# Patient Record
Sex: MALE | Race: BLACK OR AFRICAN AMERICAN | NOT HISPANIC OR LATINO | URBAN - METROPOLITAN AREA
[De-identification: names, ages, dates, MRNs, and addresses within clinical notes are randomized per-mention and may not be internally consistent; named-entity substitution may affect disease eponyms.]

---

## 2017-08-25 ENCOUNTER — INPATIENT (INPATIENT)
Facility: HOSPITAL | Age: 62
LOS: 60 days | Discharge: HOME | DRG: 1 | End: 2017-10-25
Attending: THORACIC SURGERY (CARDIOTHORACIC VASCULAR SURGERY) | Admitting: INTERNAL MEDICINE
Payer: COMMERCIAL

## 2017-08-25 VITALS
SYSTOLIC BLOOD PRESSURE: 108 MMHG | HEART RATE: 111 BPM | DIASTOLIC BLOOD PRESSURE: 76 MMHG | HEIGHT: 70 IN | RESPIRATION RATE: 22 BRPM | WEIGHT: 145.06 LBS | OXYGEN SATURATION: 100 % | TEMPERATURE: 94 F

## 2017-08-25 DIAGNOSIS — I26.99 OTHER PULMONARY EMBOLISM WITHOUT ACUTE COR PULMONALE: ICD-10-CM

## 2017-08-25 DIAGNOSIS — R07.89 OTHER CHEST PAIN: ICD-10-CM

## 2017-08-25 LAB
ALBUMIN SERPL ELPH-MCNC: 3.9 G/DL — SIGNIFICANT CHANGE UP (ref 3.3–5)
ALP SERPL-CCNC: 87 U/L — SIGNIFICANT CHANGE UP (ref 40–120)
ALT FLD-CCNC: 89 U/L RC — HIGH (ref 10–45)
ANION GAP SERPL CALC-SCNC: 13 MMOL/L — SIGNIFICANT CHANGE UP (ref 5–17)
APTT BLD: 29.7 SEC — SIGNIFICANT CHANGE UP (ref 27.5–37.4)
AST SERPL-CCNC: 101 U/L — HIGH (ref 10–40)
BASOPHILS # BLD AUTO: 0.1 K/UL — SIGNIFICANT CHANGE UP (ref 0–0.2)
BASOPHILS NFR BLD AUTO: 1.3 % — SIGNIFICANT CHANGE UP (ref 0–2)
BILIRUB SERPL-MCNC: 0.4 MG/DL — SIGNIFICANT CHANGE UP (ref 0.2–1.2)
BUN SERPL-MCNC: 15 MG/DL — SIGNIFICANT CHANGE UP (ref 7–23)
CALCIUM SERPL-MCNC: 9.2 MG/DL — SIGNIFICANT CHANGE UP (ref 8.4–10.5)
CHLORIDE SERPL-SCNC: 101 MMOL/L — SIGNIFICANT CHANGE UP (ref 96–108)
CO2 SERPL-SCNC: 26 MMOL/L — SIGNIFICANT CHANGE UP (ref 22–31)
CREAT SERPL-MCNC: 1.14 MG/DL — SIGNIFICANT CHANGE UP (ref 0.5–1.3)
D DIMER BLD IA.RAPID-MCNC: 4027 NG/ML DDU — HIGH
EOSINOPHIL # BLD AUTO: 0.3 K/UL — SIGNIFICANT CHANGE UP (ref 0–0.5)
EOSINOPHIL NFR BLD AUTO: 5.6 % — SIGNIFICANT CHANGE UP (ref 0–6)
GAS PNL BLDV: SIGNIFICANT CHANGE UP
GLUCOSE SERPL-MCNC: 81 MG/DL — SIGNIFICANT CHANGE UP (ref 70–99)
HCT VFR BLD CALC: 45 % — SIGNIFICANT CHANGE UP (ref 39–50)
HGB BLD-MCNC: 15.3 G/DL — SIGNIFICANT CHANGE UP (ref 13–17)
INR BLD: 1.09 RATIO — SIGNIFICANT CHANGE UP (ref 0.88–1.16)
LIDOCAIN IGE QN: 23 U/L — SIGNIFICANT CHANGE UP (ref 7–60)
LYMPHOCYTES # BLD AUTO: 2.2 K/UL — SIGNIFICANT CHANGE UP (ref 1–3.3)
LYMPHOCYTES # BLD AUTO: 41.2 % — SIGNIFICANT CHANGE UP (ref 13–44)
MAGNESIUM SERPL-MCNC: 2.2 MG/DL — SIGNIFICANT CHANGE UP (ref 1.6–2.6)
MCHC RBC-ENTMCNC: 34.1 GM/DL — SIGNIFICANT CHANGE UP (ref 32–36)
MCHC RBC-ENTMCNC: 34.1 PG — HIGH (ref 27–34)
MCV RBC AUTO: 100 FL — SIGNIFICANT CHANGE UP (ref 80–100)
MONOCYTES # BLD AUTO: 0.6 K/UL — SIGNIFICANT CHANGE UP (ref 0–0.9)
MONOCYTES NFR BLD AUTO: 10.6 % — SIGNIFICANT CHANGE UP (ref 2–14)
NEUTROPHILS # BLD AUTO: 2.2 K/UL — SIGNIFICANT CHANGE UP (ref 1.8–7.4)
NEUTROPHILS NFR BLD AUTO: 41.2 % — LOW (ref 43–77)
PHOSPHATE SERPL-MCNC: 2.9 MG/DL — SIGNIFICANT CHANGE UP (ref 2.5–4.5)
PLATELET # BLD AUTO: 164 K/UL — SIGNIFICANT CHANGE UP (ref 150–400)
POTASSIUM SERPL-MCNC: 4.3 MMOL/L — SIGNIFICANT CHANGE UP (ref 3.5–5.3)
POTASSIUM SERPL-SCNC: 4.3 MMOL/L — SIGNIFICANT CHANGE UP (ref 3.5–5.3)
PROT SERPL-MCNC: 7.1 G/DL — SIGNIFICANT CHANGE UP (ref 6–8.3)
PROTHROM AB SERPL-ACNC: 11.8 SEC — SIGNIFICANT CHANGE UP (ref 9.8–12.7)
RAPID RVP RESULT: SIGNIFICANT CHANGE UP
RBC # BLD: 4.49 M/UL — SIGNIFICANT CHANGE UP (ref 4.2–5.8)
RBC # FLD: 13.7 % — SIGNIFICANT CHANGE UP (ref 10.3–14.5)
SODIUM SERPL-SCNC: 140 MMOL/L — SIGNIFICANT CHANGE UP (ref 135–145)
TROPONIN T SERPL-MCNC: <0.01 NG/ML — SIGNIFICANT CHANGE UP (ref 0–0.06)
WBC # BLD: 5.2 K/UL — SIGNIFICANT CHANGE UP (ref 3.8–10.5)
WBC # FLD AUTO: 5.2 K/UL — SIGNIFICANT CHANGE UP (ref 3.8–10.5)

## 2017-08-25 PROCEDURE — 71275 CT ANGIOGRAPHY CHEST: CPT | Mod: 26

## 2017-08-25 PROCEDURE — 71010: CPT | Mod: 26

## 2017-08-25 PROCEDURE — 99285 EMERGENCY DEPT VISIT HI MDM: CPT

## 2017-08-25 RX ORDER — IBUPROFEN 200 MG
400 TABLET ORAL EVERY 6 HOURS
Qty: 0 | Refills: 0 | Status: DISCONTINUED | OUTPATIENT
Start: 2017-08-25 | End: 2017-08-27

## 2017-08-25 RX ORDER — HEPARIN SODIUM 5000 [USP'U]/ML
2500 INJECTION INTRAVENOUS; SUBCUTANEOUS EVERY 6 HOURS
Qty: 0 | Refills: 0 | Status: DISCONTINUED | OUTPATIENT
Start: 2017-08-25 | End: 2017-08-25

## 2017-08-25 RX ORDER — SODIUM CHLORIDE 9 MG/ML
1000 INJECTION INTRAMUSCULAR; INTRAVENOUS; SUBCUTANEOUS ONCE
Qty: 0 | Refills: 0 | Status: COMPLETED | OUTPATIENT
Start: 2017-08-25 | End: 2017-08-25

## 2017-08-25 RX ORDER — HEPARIN SODIUM 5000 [USP'U]/ML
INJECTION INTRAVENOUS; SUBCUTANEOUS
Qty: 25000 | Refills: 0 | Status: DISCONTINUED | OUTPATIENT
Start: 2017-08-25 | End: 2017-08-25

## 2017-08-25 RX ORDER — HEPARIN SODIUM 5000 [USP'U]/ML
5500 INJECTION INTRAVENOUS; SUBCUTANEOUS ONCE
Qty: 0 | Refills: 0 | Status: COMPLETED | OUTPATIENT
Start: 2017-08-25 | End: 2017-08-25

## 2017-08-25 RX ORDER — HEPARIN SODIUM 5000 [USP'U]/ML
5500 INJECTION INTRAVENOUS; SUBCUTANEOUS EVERY 6 HOURS
Qty: 0 | Refills: 0 | Status: DISCONTINUED | OUTPATIENT
Start: 2017-08-25 | End: 2017-08-25

## 2017-08-25 RX ORDER — MORPHINE SULFATE 50 MG/1
2 CAPSULE, EXTENDED RELEASE ORAL ONCE
Qty: 0 | Refills: 0 | Status: DISCONTINUED | OUTPATIENT
Start: 2017-08-25 | End: 2017-08-25

## 2017-08-25 RX ORDER — HEPARIN SODIUM 5000 [USP'U]/ML
5000 INJECTION INTRAVENOUS; SUBCUTANEOUS EVERY 8 HOURS
Qty: 0 | Refills: 0 | Status: DISCONTINUED | OUTPATIENT
Start: 2017-08-25 | End: 2017-08-28

## 2017-08-25 RX ADMIN — SODIUM CHLORIDE 1000 MILLILITER(S): 9 INJECTION INTRAMUSCULAR; INTRAVENOUS; SUBCUTANEOUS at 15:11

## 2017-08-25 RX ADMIN — MORPHINE SULFATE 2 MILLIGRAM(S): 50 CAPSULE, EXTENDED RELEASE ORAL at 20:09

## 2017-08-25 RX ADMIN — MORPHINE SULFATE 2 MILLIGRAM(S): 50 CAPSULE, EXTENDED RELEASE ORAL at 20:24

## 2017-08-25 RX ADMIN — HEPARIN SODIUM 1200 UNIT(S)/HR: 5000 INJECTION INTRAVENOUS; SUBCUTANEOUS at 20:18

## 2017-08-25 RX ADMIN — HEPARIN SODIUM 5500 UNIT(S): 5000 INJECTION INTRAVENOUS; SUBCUTANEOUS at 20:19

## 2017-08-25 NOTE — H&P ADULT - HISTORY OF PRESENT ILLNESS
62M with no PMHx here with chest pain. Began last week, described as pleuritic, worsens with cough and deep inspiration. Has been having SOB as well, dry cough. Recently vacationed in [facility] came back today. Saw doctor in [facility] but unclear what workup was. No fever, sick contacts, abd pain. CP described as midsternal, nonradiating. Has 25 pack year smoking history.  CTPA does not reveal PE, Pulm congestion noted.

## 2017-08-25 NOTE — ED PROVIDER NOTE - MEDICAL DECISION MAKING DETAILS
ATTD: cough / shortness of breath / chest pain - check labs, check cardiac work up. ekg, re eval for dispo.

## 2017-08-25 NOTE — ED PROVIDER NOTE - ATTENDING CONTRIBUTION TO CARE
63 y/o m with unknown pmhx presents for concern of chest pain and shortness of breath. began 1 week ago. He arrived from [facility] last night. does not speak English. has no family or  with him. Used [employer] for translation. no fever. + cough non productive. no rectal bleeding. no diarrhea. Chest pain today, worse with coughing. no pain medication taken. was offered work up at hospital in [facility] but refused as he was coming here to [place]. + smoker approx 20 pk years.  Gen. no acute distress, Non toxic   HEENT: EOMI, mmm,   Lungs: CTAB/L no C/ W /R   CVS: S1S2   Abd; Soft non tender, non distended   Ext: no edema   Neuro AAOx3 non focal clear speech   Gen. no acute distress, cachcetic,  Non toxic   HEENT: EOMI, mmm,   Lungs: CTAB/L no C/ W /R   CVS: S1S2   Abd; Soft non tender, non distended   Ext: no edema   Neuro AAOx3 non focal clear speech

## 2017-08-25 NOTE — ED PROVIDER NOTE - OBJECTIVE STATEMENT
62M no PMHx here with chest pain. Began last week, described as pleuritic, worsens with cough and deep inspiration. Has been having SOB as well, dry cough. Recently vacationed in [facility] came back today. Saw doctor in [facility] but unclear what workup was. No fever, sick contacts, abd pain. CP described as midsternal, nonradiating. Has 25 pack year smoking history.

## 2017-08-25 NOTE — ED SUB INTERN NOTE - OBJECTIVE STATEMENT FT
63 yo M unknown PMH c/o chest pain, nonproductive cough, and shortness of breath x1 week. Recently went to [facility], returned x1d ago. CP worse with cough. Came to ED after return from [facility], went to hospital over in [facility] but no workup done, no tests performed per patient. No fevers, no hemoptysis. 20 year history of smoking, no history of tuberculosis. No diarrhea, nausea, vomiting. No sick contacts    Pt creole speaking,  phone used.

## 2017-08-25 NOTE — ED PROVIDER NOTE - PHYSICAL EXAMINATION
Gen: nad a+o x3  CV: RRR no m/g/r  Pulm: CTAB no w/r/r  Abd: soft NT ND  Ext: no edema BL  Skin: no ulcers, rashes

## 2017-08-25 NOTE — ED SUB INTERN NOTE - MEDICAL DECISION MAKING DETAILS
63 yo M unknown history c/o chest pain, sob, cough x1 week. Vitals show tachycardia, tachypnea, normal rectal temp. Physical otherwise benign besides mild upper abdominal tenderness on palpation. r/o ACS, COPD, PE. Admit for further w/u due to no outpt f/u.

## 2017-08-25 NOTE — ED ADULT NURSE NOTE - OBJECTIVE STATEMENT
61 y/o male arrived to ED c.o chest pain with SOB x1 week. Pt creole speaking,  called. [id number] is  number. Pt states that he was recently in [facility] for vacation, returning last night. Pt states that he was seen by MD in [facility] but had no interventions performed because he was waiting to come back home. pt a&ox4, neg fevers/ chills, + SOB, + tachypnea, L/S clear bilat, + non productive cough, dyspnea on exertion, + chest pain when he coughs, abd soft nontender, pulse motor sensoryx4, skin warm dry intact. 18g IV RAC. Placed on CM in sinus tachycardia. Explained to patient plan of care at this time with . Will continue to monitor.

## 2017-08-26 DIAGNOSIS — R10.9 UNSPECIFIED ABDOMINAL PAIN: ICD-10-CM

## 2017-08-26 DIAGNOSIS — R11.14 BILIOUS VOMITING: ICD-10-CM

## 2017-08-26 LAB
CHOLEST SERPL-MCNC: 185 MG/DL — SIGNIFICANT CHANGE UP (ref 10–199)
HDLC SERPL-MCNC: 49 MG/DL — SIGNIFICANT CHANGE UP (ref 40–125)
LIPID PNL WITH DIRECT LDL SERPL: 123 MG/DL — SIGNIFICANT CHANGE UP
NT-PROBNP SERPL-SCNC: 4156 PG/ML — HIGH (ref 0–300)
TOTAL CHOLESTEROL/HDL RATIO MEASUREMENT: 3.8 RATIO — SIGNIFICANT CHANGE UP (ref 3.4–9.6)
TRIGL SERPL-MCNC: 67 MG/DL — SIGNIFICANT CHANGE UP (ref 10–149)
TROPONIN T SERPL-MCNC: <0.01 NG/ML — SIGNIFICANT CHANGE UP (ref 0–0.06)

## 2017-08-26 PROCEDURE — 74177 CT ABD & PELVIS W/CONTRAST: CPT | Mod: 26

## 2017-08-26 PROCEDURE — 93010 ELECTROCARDIOGRAM REPORT: CPT

## 2017-08-26 RX ORDER — METRONIDAZOLE 500 MG
500 TABLET ORAL EVERY 8 HOURS
Qty: 0 | Refills: 0 | Status: DISCONTINUED | OUTPATIENT
Start: 2017-08-27 | End: 2017-08-27

## 2017-08-26 RX ORDER — MORPHINE SULFATE 50 MG/1
2 CAPSULE, EXTENDED RELEASE ORAL EVERY 4 HOURS
Qty: 0 | Refills: 0 | Status: DISCONTINUED | OUTPATIENT
Start: 2017-08-26 | End: 2017-08-28

## 2017-08-26 RX ORDER — METRONIDAZOLE 500 MG
TABLET ORAL
Qty: 0 | Refills: 0 | Status: DISCONTINUED | OUTPATIENT
Start: 2017-08-26 | End: 2017-08-27

## 2017-08-26 RX ORDER — ONDANSETRON 8 MG/1
4 TABLET, FILM COATED ORAL EVERY 6 HOURS
Qty: 0 | Refills: 0 | Status: DISCONTINUED | OUTPATIENT
Start: 2017-08-26 | End: 2017-09-08

## 2017-08-26 RX ORDER — MORPHINE SULFATE 50 MG/1
1 CAPSULE, EXTENDED RELEASE ORAL ONCE
Qty: 0 | Refills: 0 | Status: DISCONTINUED | OUTPATIENT
Start: 2017-08-26 | End: 2017-08-26

## 2017-08-26 RX ORDER — METRONIDAZOLE 500 MG
500 TABLET ORAL ONCE
Qty: 0 | Refills: 0 | Status: COMPLETED | OUTPATIENT
Start: 2017-08-26 | End: 2017-08-26

## 2017-08-26 RX ORDER — CIPROFLOXACIN LACTATE 400MG/40ML
200 VIAL (ML) INTRAVENOUS EVERY 12 HOURS
Qty: 0 | Refills: 0 | Status: DISCONTINUED | OUTPATIENT
Start: 2017-08-27 | End: 2017-08-27

## 2017-08-26 RX ORDER — CIPROFLOXACIN LACTATE 400MG/40ML
200 VIAL (ML) INTRAVENOUS ONCE
Qty: 0 | Refills: 0 | Status: COMPLETED | OUTPATIENT
Start: 2017-08-26 | End: 2017-08-26

## 2017-08-26 RX ORDER — CIPROFLOXACIN LACTATE 400MG/40ML
VIAL (ML) INTRAVENOUS
Qty: 0 | Refills: 0 | Status: DISCONTINUED | OUTPATIENT
Start: 2017-08-26 | End: 2017-08-27

## 2017-08-26 RX ORDER — SODIUM CHLORIDE 9 MG/ML
1000 INJECTION INTRAMUSCULAR; INTRAVENOUS; SUBCUTANEOUS
Qty: 0 | Refills: 0 | Status: DISCONTINUED | OUTPATIENT
Start: 2017-08-26 | End: 2017-08-28

## 2017-08-26 RX ADMIN — HEPARIN SODIUM 5000 UNIT(S): 5000 INJECTION INTRAVENOUS; SUBCUTANEOUS at 21:08

## 2017-08-26 RX ADMIN — HEPARIN SODIUM 5000 UNIT(S): 5000 INJECTION INTRAVENOUS; SUBCUTANEOUS at 14:37

## 2017-08-26 RX ADMIN — MORPHINE SULFATE 1 MILLIGRAM(S): 50 CAPSULE, EXTENDED RELEASE ORAL at 16:35

## 2017-08-26 RX ADMIN — HEPARIN SODIUM 5000 UNIT(S): 5000 INJECTION INTRAVENOUS; SUBCUTANEOUS at 05:46

## 2017-08-26 RX ADMIN — MORPHINE SULFATE 1 MILLIGRAM(S): 50 CAPSULE, EXTENDED RELEASE ORAL at 16:46

## 2017-08-26 RX ADMIN — MORPHINE SULFATE 1 MILLIGRAM(S): 50 CAPSULE, EXTENDED RELEASE ORAL at 04:54

## 2017-08-26 RX ADMIN — Medication 100 MILLIGRAM(S): at 20:50

## 2017-08-26 RX ADMIN — MORPHINE SULFATE 1 MILLIGRAM(S): 50 CAPSULE, EXTENDED RELEASE ORAL at 05:30

## 2017-08-26 RX ADMIN — Medication 400 MILLIGRAM(S): at 02:34

## 2017-08-26 RX ADMIN — Medication 400 MILLIGRAM(S): at 01:47

## 2017-08-26 RX ADMIN — MORPHINE SULFATE 2 MILLIGRAM(S): 50 CAPSULE, EXTENDED RELEASE ORAL at 21:15

## 2017-08-26 NOTE — CHART NOTE - NSCHARTNOTEFT_GEN_A_CORE
pt earlier c/o chest pain -- when I went to see him he fell asleep.  Hours later he c/o excruciating mid abdominal pain.No N/V, moved bowel today. On exam guarded abdomen, bowel sound present, tender, soft VSS. CT A/P ordered. Spoke to Dr Segovia.  Adwoa Grigsby (PA) [employer] # [id number].

## 2017-08-26 NOTE — PROGRESS NOTE ADULT - SUBJECTIVE AND OBJECTIVE BOX
Patient is a 62y old  Male who presents with a chief complaint of SOOB (25 Aug 2017 22:27)      SUBJECTIVE / OVERNIGHT EVENTS:  Nausea +, vomited today. CT abdomen done  Review of Systems:   CONSTITUTIONAL: No fever, weight loss, or fatigue  EYES: No eye pain, visual disturbances, or discharge  ENMT:  No difficulty hearing, tinnitus, vertigo; No sinus or throat pain  NECK: No pain or stiffness  BREASTS: No pain, masses, or nipple discharge  RESPIRATORY: No cough, wheezing, chills or hemoptysis; No shortness of breath  CARDIOVASCULAR: No chest pain, palpitations, dizziness, or leg swelling  GASTROINTESTINAL: No abdominal or epigastric pain.  No diarrhea or constipation. No melena or hematochezia.  GENITOURINARY: No dysuria, frequency, hematuria, or incontinence  NEUROLOGICAL: No headaches, memory loss, loss of strength, numbness, or tremors  SKIN: No itching, burning, rashes, or lesions   LYMPH NODES: No enlarged glands  ENDOCRINE: No heat or cold intolerance; No hair loss  MUSCULOSKELETAL: No joint pain or swelling; No muscle, back, or extremity pain  PSYCHIATRIC: No depression, anxiety, mood swings, or difficulty sleeping  HEME/LYMPH: No easy bruising, or bleeding gums  ALLERY AND IMMUNOLOGIC: No hives or eczema    MEDICATIONS  (STANDING):  heparin  Injectable 5000 Unit(s) SubCutaneous every 8 hours  sodium chloride 0.9%. 1000 milliLiter(s) (100 mL/Hr) IV Continuous <Continuous>    MEDICATIONS  (PRN):  ibuprofen  Tablet 400 milliGRAM(s) Oral every 6 hours PRN Pain  ondansetron Injectable 4 milliGRAM(s) IV Push every 6 hours PRN Nausea and/or Vomiting      PHYSICAL EXAM:  Vital Signs Last 24 Hrs  T(C): 36.6 (26 Aug 2017 12:56), Max: 36.9 (25 Aug 2017 19:55)  T(F): 97.8 (26 Aug 2017 12:56), Max: 98.4 (25 Aug 2017 19:55)  HR: 66 (26 Aug 2017 16:46) (66 - 117)  BP: 120/90 (26 Aug 2017 16:46) (93/71 - 124/91)  BP(mean): --  RR: 18 (26 Aug 2017 12:56) (18 - 26)  SpO2: 98% (26 Aug 2017 12:56) (95% - 98%)  I&O's Summary    26 Aug 2017 07:01  -  26 Aug 2017 19:18  --------------------------------------------------------  IN: 440 mL / OUT: 200 mL / NET: 240 mL      GENERAL: NAD, well-developed  HEAD:  Atraumatic, Normocephalic  EYES: EOMI, PERRLA, conjunctiva and sclera clear  NECK: Supple, No JVD  CHEST/LUNG: Clear to auscultation bilaterally; No wheeze  HEART: Regular rate and rhythm; No murmurs, rubs, or gallops  ABDOMEN: Soft, Nontender, Nondistended; Bowel sounds present  EXTREMITIES:  2+ Peripheral Pulses, No clubbing, cyanosis, or edema  PSYCH: AAOx3  NEUROLOGY: non-focal  SKIN: No rashes or lesions    LABS:  CAPILLARY BLOOD GLUCOSE                              15.3   5.2   )-----------( 164      ( 25 Aug 2017 14:40 )             45.0     08-25    140  |  101  |  15  ----------------------------<  81  4.3   |  26  |  1.14    Ca    9.2      25 Aug 2017 14:40  Phos  2.9     08-25  Mg     2.2     08-25    TPro  7.1  /  Alb  3.9  /  TBili  0.4  /  DBili  x   /  AST  101<H>  /  ALT  89<H>  /  AlkPhos  87  08-25    PT/INR - ( 25 Aug 2017 20:33 )   PT: 11.8 sec;   INR: 1.09 ratio         PTT - ( 25 Aug 2017 20:33 )  PTT:29.7 sec  CARDIAC MARKERS ( 26 Aug 2017 06:53 )  x     / <0.01 ng/mL / x     / x     / x      CARDIAC MARKERS ( 25 Aug 2017 23:01 )  x     / <0.01 ng/mL / x     / x     / x      CARDIAC MARKERS ( 25 Aug 2017 14:40 )  x     / <0.01 ng/mL / 270 U/L / x     / 4.9 ng/mL          RADIOLOGY & ADDITIONAL TESTS:    Imaging Personally Reviewed:    Consultant(s) Notes Reviewed:      Care Discussed with Consultants/Other Providers:

## 2017-08-27 LAB
APPEARANCE UR: CLEAR — SIGNIFICANT CHANGE UP
BILIRUB UR-MCNC: NEGATIVE — SIGNIFICANT CHANGE UP
COLOR SPEC: YELLOW — SIGNIFICANT CHANGE UP
DIFF PNL FLD: NEGATIVE — SIGNIFICANT CHANGE UP
GLUCOSE UR QL: NEGATIVE — SIGNIFICANT CHANGE UP
HCT VFR BLD CALC: 44.7 % — SIGNIFICANT CHANGE UP (ref 39–50)
HGB BLD-MCNC: 14.6 G/DL — SIGNIFICANT CHANGE UP (ref 13–17)
KETONES UR-MCNC: ABNORMAL
LEUKOCYTE ESTERASE UR-ACNC: NEGATIVE — SIGNIFICANT CHANGE UP
MCHC RBC-ENTMCNC: 32 PG — SIGNIFICANT CHANGE UP (ref 27–34)
MCHC RBC-ENTMCNC: 32.7 GM/DL — SIGNIFICANT CHANGE UP (ref 32–36)
MCV RBC AUTO: 97.9 FL — SIGNIFICANT CHANGE UP (ref 80–100)
NITRITE UR-MCNC: NEGATIVE — SIGNIFICANT CHANGE UP
PH UR: 6 — SIGNIFICANT CHANGE UP (ref 5–8)
PLATELET # BLD AUTO: 149 K/UL — LOW (ref 150–400)
PROT UR-MCNC: 30 MG/DL
RBC # BLD: 4.57 M/UL — SIGNIFICANT CHANGE UP (ref 4.2–5.8)
RBC # FLD: 13.7 % — SIGNIFICANT CHANGE UP (ref 10.3–14.5)
SP GR SPEC: >1.03 — HIGH (ref 1.01–1.02)
UROBILINOGEN FLD QL: NEGATIVE — SIGNIFICANT CHANGE UP
WBC # BLD: 4.8 K/UL — SIGNIFICANT CHANGE UP (ref 3.8–10.5)
WBC # FLD AUTO: 4.8 K/UL — SIGNIFICANT CHANGE UP (ref 3.8–10.5)

## 2017-08-27 RX ORDER — PANTOPRAZOLE SODIUM 20 MG/1
40 TABLET, DELAYED RELEASE ORAL
Qty: 0 | Refills: 0 | Status: DISCONTINUED | OUTPATIENT
Start: 2017-08-27 | End: 2017-09-12

## 2017-08-27 RX ORDER — SUCRALFATE 1 G
1 TABLET ORAL
Qty: 0 | Refills: 0 | Status: DISCONTINUED | OUTPATIENT
Start: 2017-08-27 | End: 2017-09-12

## 2017-08-27 RX ADMIN — MORPHINE SULFATE 2 MILLIGRAM(S): 50 CAPSULE, EXTENDED RELEASE ORAL at 22:30

## 2017-08-27 RX ADMIN — SODIUM CHLORIDE 100 MILLILITER(S): 9 INJECTION INTRAMUSCULAR; INTRAVENOUS; SUBCUTANEOUS at 21:31

## 2017-08-27 RX ADMIN — MORPHINE SULFATE 2 MILLIGRAM(S): 50 CAPSULE, EXTENDED RELEASE ORAL at 21:28

## 2017-08-27 RX ADMIN — Medication 1 GRAM(S): at 23:58

## 2017-08-27 RX ADMIN — MORPHINE SULFATE 2 MILLIGRAM(S): 50 CAPSULE, EXTENDED RELEASE ORAL at 15:23

## 2017-08-27 RX ADMIN — HEPARIN SODIUM 5000 UNIT(S): 5000 INJECTION INTRAVENOUS; SUBCUTANEOUS at 21:30

## 2017-08-27 RX ADMIN — Medication 100 MILLIGRAM(S): at 05:48

## 2017-08-27 RX ADMIN — MORPHINE SULFATE 2 MILLIGRAM(S): 50 CAPSULE, EXTENDED RELEASE ORAL at 10:15

## 2017-08-27 RX ADMIN — ONDANSETRON 4 MILLIGRAM(S): 8 TABLET, FILM COATED ORAL at 10:17

## 2017-08-27 RX ADMIN — SODIUM CHLORIDE 100 MILLILITER(S): 9 INJECTION INTRAMUSCULAR; INTRAVENOUS; SUBCUTANEOUS at 10:17

## 2017-08-27 RX ADMIN — MORPHINE SULFATE 2 MILLIGRAM(S): 50 CAPSULE, EXTENDED RELEASE ORAL at 03:00

## 2017-08-27 RX ADMIN — MORPHINE SULFATE 2 MILLIGRAM(S): 50 CAPSULE, EXTENDED RELEASE ORAL at 19:07

## 2017-08-27 RX ADMIN — PANTOPRAZOLE SODIUM 40 MILLIGRAM(S): 20 TABLET, DELAYED RELEASE ORAL at 17:16

## 2017-08-27 RX ADMIN — MORPHINE SULFATE 2 MILLIGRAM(S): 50 CAPSULE, EXTENDED RELEASE ORAL at 05:47

## 2017-08-27 RX ADMIN — MORPHINE SULFATE 2 MILLIGRAM(S): 50 CAPSULE, EXTENDED RELEASE ORAL at 02:00

## 2017-08-27 RX ADMIN — HEPARIN SODIUM 5000 UNIT(S): 5000 INJECTION INTRAVENOUS; SUBCUTANEOUS at 05:48

## 2017-08-27 RX ADMIN — SODIUM CHLORIDE 100 MILLILITER(S): 9 INJECTION INTRAMUSCULAR; INTRAVENOUS; SUBCUTANEOUS at 05:55

## 2017-08-27 RX ADMIN — SODIUM CHLORIDE 100 MILLILITER(S): 9 INJECTION INTRAMUSCULAR; INTRAVENOUS; SUBCUTANEOUS at 23:59

## 2017-08-27 RX ADMIN — Medication 100 MILLIGRAM(S): at 13:18

## 2017-08-27 RX ADMIN — Medication 1 GRAM(S): at 17:29

## 2017-08-27 RX ADMIN — ONDANSETRON 4 MILLIGRAM(S): 8 TABLET, FILM COATED ORAL at 15:23

## 2017-08-27 RX ADMIN — MORPHINE SULFATE 2 MILLIGRAM(S): 50 CAPSULE, EXTENDED RELEASE ORAL at 06:03

## 2017-08-27 RX ADMIN — HEPARIN SODIUM 5000 UNIT(S): 5000 INJECTION INTRAVENOUS; SUBCUTANEOUS at 13:19

## 2017-08-27 NOTE — CONSULT NOTE ADULT - SUBJECTIVE AND OBJECTIVE BOX
Chief Complaint:  Patient is a 62y old  Male who presents with a chief complaint of SOOB (25 Aug 2017 22:27)      HPI: 62M admitted with new onset pulm edema. he is endorsing abdominal pain which is always present   he had a ct which shows no structural pathology    Allergies:  No Known Allergies      Medications:  ibuprofen  Tablet 400 milliGRAM(s) Oral every 6 hours PRN  heparin  Injectable 5000 Unit(s) SubCutaneous every 8 hours  sodium chloride 0.9%. 1000 milliLiter(s) IV Continuous <Continuous>  ondansetron Injectable 4 milliGRAM(s) IV Push every 6 hours PRN  morphine  - Injectable 2 milliGRAM(s) IV Push every 4 hours  ciprofloxacin   IVPB   IV Intermittent   metroNIDAZOLE  IVPB   IV Intermittent   ciprofloxacin   IVPB 200 milliGRAM(s) IV Intermittent every 12 hours  metroNIDAZOLE  IVPB 500 milliGRAM(s) IV Intermittent every 8 hours      PMHX/PSHX:  No pertinent past medical history  No significant past surgical history      Family history:  No pertinent family history in first degree relatives      Social History:     ROS:     General:  No wt loss, fevers, chills, night sweats, fatigue,   Eyes:  Good vision, no reported pain  ENT:  No sore throat, pain, runny nose, dysphagia  CV:  No pain, palpitations, hypo/hypertension  Resp:  No dyspnea, cough, tachypnea, wheezing  GI:  + pain, No nausea, No vomiting, No diarrhea, No constipation, No weight loss, No fever, No pruritis, No rectal bleeding, No tarry stools, No dysphagia,  :  No pain, bleeding, incontinence, nocturia  Muscle:  No pain, weakness  Neuro:  No weakness, tingling, memory problems  Psych:  No fatigue, insomnia, mood problems, depression  Endocrine:  No polyuria, polydipsia, cold/heat intolerance  Heme:  No petechiae, ecchymosis, easy bruisability  Skin:  No rash, tattoos, scars, edema      PHYSICAL EXAM:   Vital Signs:  Vital Signs Last 24 Hrs  T(C): 36.4 (27 Aug 2017 13:04), Max: 36.7 (27 Aug 2017 05:24)  T(F): 97.5 (27 Aug 2017 13:04), Max: 98 (27 Aug 2017 05:24)  HR: 104 (27 Aug 2017 13:04) (66 - 111)  BP: 106/73 (27 Aug 2017 13:04) (94/103 - 120/90)  BP(mean): --  RR: 18 (27 Aug 2017 13:04) (18 - 28)  SpO2: 95% (27 Aug 2017 13:04) (92% - 98%)  Daily     Daily Weight in k.9 (27 Aug 2017 05:24)    GENERAL:  Appears stated age, well-groomed, well-nourished, no distress  HEENT:  NC/AT,  conjunctivae clear and pink, no thyromegaly, nodules, adenopathy, no JVD, sclera -anicteric  CHEST:  Full & symmetric excursion, no increased effort, breath sounds clear  HEART:  Regular rhythm, S1, S2, no murmur/rub/S3/S4, no abdominal bruit, no edema  ABDOMEN:  Soft, non-tender, non-distended, normoactive bowel sounds,  no masses ,no hepato-splenomegaly, no signs of chronic liver disease  EXTEREMITIES:  no cyanosis,clubbing or edema  SKIN:  No rash/erythema/ecchymoses/petechiae/wounds/abscess/warm/dry  NEURO:  Alert, oriented, no asterixis, no tremor, no encephalopathy    LABS:                        14.6   4.8   )-----------( 149      ( 27 Aug 2017 09:18 )             44.7     08-25    140  |  101  |  15  ----------------------------<  81  4.3   |  26  |  1.14    Ca    9.2      25 Aug 2017 14:40  Phos  2.9     08-25  Mg     2.2     08-25    TPro  7.1  /  Alb  3.9  /  TBili  0.4  /  DBili  x   /  AST  101<H>  /  ALT  89<H>  /  AlkPhos  87  08-25    LIVER FUNCTIONS - ( 25 Aug 2017 14:40 )  Alb: 3.9 g/dL / Pro: 7.1 g/dL / ALK PHOS: 87 U/L / ALT: 89 U/L RC / AST: 101 U/L / GGT: x           PT/INR - ( 25 Aug 2017 20:33 )   PT: 11.8 sec;   INR: 1.09 ratio         PTT - ( 25 Aug 2017 20:33 )  PTT:29.7 sec        Imaging:

## 2017-08-27 NOTE — PROGRESS NOTE ADULT - SUBJECTIVE AND OBJECTIVE BOX
Patient is a 62y old  Male who presents with a chief complaint of SOOB (25 Aug 2017 22:27)      SUBJECTIVE / OVERNIGHT EVENTS:  Pain persists, Sinus tachycardia noted    Review of Systems:   CONSTITUTIONAL: No fever, weight loss, or fatigue  EYES: No eye pain, visual disturbances, or discharge  ENMT:  No difficulty hearing, tinnitus, vertigo; No sinus or throat pain  NECK: No pain or stiffness  BREASTS: No pain, masses, or nipple discharge  RESPIRATORY: No cough, wheezing, chills or hemoptysis; No shortness of breath  CARDIOVASCULAR: No chest pain, palpitations, dizziness, or leg swelling  GASTROINTESTINAL: Epigastric pain +. No nausea, vomiting, or hematemesis; No diarrhea or constipation. No melena or hematochezia.  GENITOURINARY: No dysuria, frequency, hematuria, or incontinence  NEUROLOGICAL: No headaches, memory loss, loss of strength, numbness, or tremors  SKIN: No itching, burning, rashes, or lesions   LYMPH NODES: No enlarged glands  ENDOCRINE: No heat or cold intolerance; No hair loss  MUSCULOSKELETAL: No joint pain or swelling; No muscle, back, or extremity pain  PSYCHIATRIC: No depression, anxiety, mood swings, or difficulty sleeping  HEME/LYMPH: No easy bruising, or bleeding gums  ALLERY AND IMMUNOLOGIC: No hives or eczema    MEDICATIONS  (STANDING):  heparin  Injectable 5000 Unit(s) SubCutaneous every 8 hours  sodium chloride 0.9%. 1000 milliLiter(s) (100 mL/Hr) IV Continuous <Continuous>  morphine  - Injectable 2 milliGRAM(s) IV Push every 4 hours  ciprofloxacin   IVPB   IV Intermittent   metroNIDAZOLE  IVPB   IV Intermittent   ciprofloxacin   IVPB 200 milliGRAM(s) IV Intermittent every 12 hours  metroNIDAZOLE  IVPB 500 milliGRAM(s) IV Intermittent every 8 hours    MEDICATIONS  (PRN):  ibuprofen  Tablet 400 milliGRAM(s) Oral every 6 hours PRN Pain  ondansetron Injectable 4 milliGRAM(s) IV Push every 6 hours PRN Nausea and/or Vomiting      PHYSICAL EXAM:  Vital Signs Last 24 Hrs  T(C): 36.7 (27 Aug 2017 05:24), Max: 36.7 (27 Aug 2017 05:24)  T(F): 98 (27 Aug 2017 05:24), Max: 98 (27 Aug 2017 05:24)  HR: 100 (27 Aug 2017 10:30) (66 - 111)  BP: 112/83 (27 Aug 2017 10:30) (93/71 - 120/90)  BP(mean): --  RR: 22 (27 Aug 2017 05:24) (18 - 28)  SpO2: 98% (27 Aug 2017 05:24) (92% - 98%)  I&O's Summary    26 Aug 2017 07:01  -  27 Aug 2017 07:00  --------------------------------------------------------  IN: 980 mL / OUT: 800 mL / NET: 180 mL      GENERAL: NAD, well-developed  HEAD:  Atraumatic, Normocephalic  EYES: EOMI, PERRLA, conjunctiva and sclera clear  NECK: Supple, No JVD  CHEST/LUNG: Clear to auscultation bilaterally; No wheeze  HEART: Regular rate and rhythm; No murmurs, rubs, or gallops  ABDOMEN: Soft, Mild to moderate epigastric tenderness, Nondistended; Bowel sounds present  EXTREMITIES:  2+ Peripheral Pulses, No clubbing, cyanosis, or edema  PSYCH: AAOx3  NEUROLOGY: non-focal  SKIN: No rashes or lesions    LABS:  CAPILLARY BLOOD GLUCOSE                              14.6   4.8   )-----------( 149      ( 27 Aug 2017 09:18 )             44.7     08-25    140  |  101  |  15  ----------------------------<  81  4.3   |  26  |  1.14    Ca    9.2      25 Aug 2017 14:40  Phos  2.9     08-25  Mg     2.2     08-25    TPro  7.1  /  Alb  3.9  /  TBili  0.4  /  DBili  x   /  AST  101<H>  /  ALT  89<H>  /  AlkPhos  87  08-25    PT/INR - ( 25 Aug 2017 20:33 )   PT: 11.8 sec;   INR: 1.09 ratio         PTT - ( 25 Aug 2017 20:33 )  PTT:29.7 sec  CARDIAC MARKERS ( 26 Aug 2017 06:53 )  x     / <0.01 ng/mL / x     / x     / x      CARDIAC MARKERS ( 25 Aug 2017 23:01 )  x     / <0.01 ng/mL / x     / x     / x      CARDIAC MARKERS ( 25 Aug 2017 14:40 )  x     / <0.01 ng/mL / 270 U/L / x     / 4.9 ng/mL          RADIOLOGY & ADDITIONAL TESTS:    Imaging Personally Reviewed:    Consultant(s) Notes Reviewed:      Care Discussed with Consultants/Other Providers:

## 2017-08-27 NOTE — CONSULT NOTE ADULT - PROBLEM SELECTOR RECOMMENDATION 9
no pathology on CT  proton pump inhibitor bid  carafate 1g four times a day  diet as tolerated  await echo, symptoms sounds suspicious for pain from heart failure  further recs pending above no pathology on CT  proton pump inhibitor bid  carafate 1g four times a day  diet as tolerated  await echo, symptoms sounds suspicious for pain from heart failure  further recs pendng above  dc nsaids  no GI indication for antibiotics

## 2017-08-27 NOTE — CONSULT NOTE ADULT - SUBJECTIVE AND OBJECTIVE BOX
Requesting Physician : Dr. Segovia     Reason for Consultation: Chest pain     HISTORY OF PRESENT ILLNESS: 62 year old male with no significant PMHx who is being evaluated for chest pain.  The patient was admitted with chest pain which he describes as sharp, pleuritic and worse with cough and deep inspiration.  The pain is associated with sob and cough but no diaphoresis, n/v/, or any other associated cardiac symptoms.  He subsequently came to the ED where a ct scan was performed showing no PE.  Cardiology consulted to further evaluate.  The patient currently denies chest pain but does report generalized abdominal pain.  The patient's symptoms are non-exertional.  He reports never having a cardiac workup in the past.       PAST MEDICAL & SURGICAL HISTORY:  No pertinent past medical history  No significant past surgical history          MEDICATIONS:  MEDICATIONS  (STANDING):  heparin  Injectable 5000 Unit(s) SubCutaneous every 8 hours  sodium chloride 0.9%. 1000 milliLiter(s) (100 mL/Hr) IV Continuous <Continuous>  morphine  - Injectable 2 milliGRAM(s) IV Push every 4 hours  ciprofloxacin   IVPB   IV Intermittent   metroNIDAZOLE  IVPB   IV Intermittent   ciprofloxacin   IVPB 200 milliGRAM(s) IV Intermittent every 12 hours  metroNIDAZOLE  IVPB 500 milliGRAM(s) IV Intermittent every 8 hours      Allergies    No Known Allergies    Intolerances        FAMILY HISTORY:  No pertinent family history in first degree relatives    Non-contributary for premature coronary disease or sudden cardiac death    SOCIAL HISTORY:    [x ] Non-smoker  [ ] Smoker  [ ] Alcohol      REVIEW OF SYSTEMS:  [x ]chest pain  [x  ]shortness of breath  [  ]palpitations  [  ]syncope  [ ]near syncope [ ]upper extremity weakness   [ ] lower extremity weakness  [  ]diplopia  [  ]altered mental status   [  ]fevers  [ ]chills [ ]nausea  [ ]vomitting  [  ]dysphagia    [x ]abdominal pain  [ ]melena  [ ]BRBPR    [  ]epistaxis  [  ]rash    [ ]lower extremity edema        [x ] All others negative	  [ ] Unable to obtain    PHYSICAL EXAM:  T(C): 36.7 (08-27-17 @ 05:24), Max: 36.7 (08-27-17 @ 05:24)  HR: 100 (08-27-17 @ 10:30) (66 - 111)  BP: 112/83 (08-27-17 @ 10:30) (93/71 - 120/90)  RR: 22 (08-27-17 @ 05:24) (18 - 28)  SpO2: 98% (08-27-17 @ 05:24) (92% - 98%)  Wt(kg): --  I&O's Summary    26 Aug 2017 07:01  -  27 Aug 2017 07:00  --------------------------------------------------------  IN: 980 mL / OUT: 800 mL / NET: 180 mL    27 Aug 2017 07:01  -  27 Aug 2017 12:25  --------------------------------------------------------  IN: 320 mL / OUT: 0 mL / NET: 320 mL            Lymphatic: No lymphadenopathy , no edema  Cardiovascular: Normal S1 S2, No JVD, No murmurs ,   Respiratory: Lungs clear to auscultation, normal effort 	  Gastrointestinal:  Soft, Non-tender, + BS	  Skin: No rashes, No ecchymoses, No cyanosis, warm to touch  Musculoskeletal: Normal range of motion, normal strength        TELEMETRY: SR	    ECG: SR with anterior q waves suggestive of old anterior infarct  	  RADIOLOGY:  OTHER:     DIAGNOSTIC TESTING:  [ ] Echocardiogram:  [ ]  Catheterization:  [ ] Stress Test:    	  	  LABS:	 	    CARDIAC MARKERS:  CARDIAC MARKERS ( 26 Aug 2017 06:53 )  x     / <0.01 ng/mL / x     / x     / x      CARDIAC MARKERS ( 25 Aug 2017 23:01 )  x     / <0.01 ng/mL / x     / x     / x      CARDIAC MARKERS ( 25 Aug 2017 14:40 )  x     / <0.01 ng/mL / 270 U/L / x     / 4.9 ng/mL                              14.6   4.8   )-----------( 149      ( 27 Aug 2017 09:18 )             44.7     08-25    140  |  101  |  15  ----------------------------<  81  4.3   |  26  |  1.14    Ca    9.2      25 Aug 2017 14:40  Phos  2.9     08-25  Mg     2.2     08-25    TPro  7.1  /  Alb  3.9  /  TBili  0.4  /  DBili  x   /  AST  101<H>  /  ALT  89<H>  /  AlkPhos  87  08-25    proBNP:   Lipid Profile:   HgA1c:   TSH:     ASSESSMENT/PLAN: 63 yo M with no significant PMHx being seen for chest pain.   -Cardiac enzymes negative and the patient CP resolved  -Given abnormal ECG and CT findings suggestive of cardiomegaly, recommend TTE to assess LV  -Given pulmonary edema on CT scan, would hold IVF and keep O > I  -Workup of abdominal pain per primary team  -further workup pending above.     Jing Tee MD  [facility]  [address], [address]  [place] [address]  office: [phone]  pager: [phone]

## 2017-08-28 PROCEDURE — 93306 TTE W/DOPPLER COMPLETE: CPT | Mod: 26

## 2017-08-28 PROCEDURE — 93010 ELECTROCARDIOGRAM REPORT: CPT

## 2017-08-28 RX ORDER — HEPARIN SODIUM 5000 [USP'U]/ML
5500 INJECTION INTRAVENOUS; SUBCUTANEOUS ONCE
Qty: 0 | Refills: 0 | Status: COMPLETED | OUTPATIENT
Start: 2017-08-28 | End: 2017-08-28

## 2017-08-28 RX ORDER — FUROSEMIDE 40 MG
40 TABLET ORAL
Qty: 0 | Refills: 0 | Status: DISCONTINUED | OUTPATIENT
Start: 2017-08-28 | End: 2017-09-01

## 2017-08-28 RX ORDER — ACETAMINOPHEN 500 MG
650 TABLET ORAL ONCE
Qty: 0 | Refills: 0 | Status: COMPLETED | OUTPATIENT
Start: 2017-08-28 | End: 2017-08-28

## 2017-08-28 RX ORDER — HEPARIN SODIUM 5000 [USP'U]/ML
5500 INJECTION INTRAVENOUS; SUBCUTANEOUS EVERY 6 HOURS
Qty: 0 | Refills: 0 | Status: DISCONTINUED | OUTPATIENT
Start: 2017-08-28 | End: 2017-08-31

## 2017-08-28 RX ORDER — MORPHINE SULFATE 50 MG/1
1 CAPSULE, EXTENDED RELEASE ORAL ONCE
Qty: 0 | Refills: 0 | Status: DISCONTINUED | OUTPATIENT
Start: 2017-08-28 | End: 2017-08-28

## 2017-08-28 RX ORDER — HEPARIN SODIUM 5000 [USP'U]/ML
INJECTION INTRAVENOUS; SUBCUTANEOUS
Qty: 25000 | Refills: 0 | Status: DISCONTINUED | OUTPATIENT
Start: 2017-08-28 | End: 2017-08-31

## 2017-08-28 RX ORDER — HEPARIN SODIUM 5000 [USP'U]/ML
2500 INJECTION INTRAVENOUS; SUBCUTANEOUS EVERY 6 HOURS
Qty: 0 | Refills: 0 | Status: DISCONTINUED | OUTPATIENT
Start: 2017-08-28 | End: 2017-08-31

## 2017-08-28 RX ADMIN — MORPHINE SULFATE 1 MILLIGRAM(S): 50 CAPSULE, EXTENDED RELEASE ORAL at 23:50

## 2017-08-28 RX ADMIN — Medication 40 MILLIGRAM(S): at 18:18

## 2017-08-28 RX ADMIN — HEPARIN SODIUM 1200 UNIT(S)/HR: 5000 INJECTION INTRAVENOUS; SUBCUTANEOUS at 18:31

## 2017-08-28 RX ADMIN — MORPHINE SULFATE 2 MILLIGRAM(S): 50 CAPSULE, EXTENDED RELEASE ORAL at 06:19

## 2017-08-28 RX ADMIN — PANTOPRAZOLE SODIUM 40 MILLIGRAM(S): 20 TABLET, DELAYED RELEASE ORAL at 06:19

## 2017-08-28 RX ADMIN — Medication 1 GRAM(S): at 13:17

## 2017-08-28 RX ADMIN — HEPARIN SODIUM 5000 UNIT(S): 5000 INJECTION INTRAVENOUS; SUBCUTANEOUS at 13:18

## 2017-08-28 RX ADMIN — Medication 650 MILLIGRAM(S): at 04:42

## 2017-08-28 RX ADMIN — MORPHINE SULFATE 1 MILLIGRAM(S): 50 CAPSULE, EXTENDED RELEASE ORAL at 23:19

## 2017-08-28 RX ADMIN — Medication 1 GRAM(S): at 23:19

## 2017-08-28 RX ADMIN — PANTOPRAZOLE SODIUM 40 MILLIGRAM(S): 20 TABLET, DELAYED RELEASE ORAL at 18:17

## 2017-08-28 RX ADMIN — MORPHINE SULFATE 2 MILLIGRAM(S): 50 CAPSULE, EXTENDED RELEASE ORAL at 03:02

## 2017-08-28 RX ADMIN — HEPARIN SODIUM 5500 UNIT(S): 5000 INJECTION INTRAVENOUS; SUBCUTANEOUS at 18:18

## 2017-08-28 RX ADMIN — MORPHINE SULFATE 2 MILLIGRAM(S): 50 CAPSULE, EXTENDED RELEASE ORAL at 04:00

## 2017-08-28 RX ADMIN — Medication 1 GRAM(S): at 18:17

## 2017-08-28 RX ADMIN — Medication 1 GRAM(S): at 06:19

## 2017-08-28 RX ADMIN — MORPHINE SULFATE 2 MILLIGRAM(S): 50 CAPSULE, EXTENDED RELEASE ORAL at 06:47

## 2017-08-28 RX ADMIN — HEPARIN SODIUM 5000 UNIT(S): 5000 INJECTION INTRAVENOUS; SUBCUTANEOUS at 06:19

## 2017-08-28 NOTE — PROGRESS NOTE ADULT - SUBJECTIVE AND OBJECTIVE BOX
Patient denies CP, Breathing Better ROS otherwise (-)  	  MEDICATIONS:  MEDICATIONS  (STANDING):  heparin  Injectable 5000 Unit(s) SubCutaneous every 8 hours  pantoprazole    Tablet 40 milliGRAM(s) Oral two times a day before meals  sucralfate suspension 1 Gram(s) Oral four times a day      LABS:	 	    CARDIAC MARKERS:  CARDIAC MARKERS ( 26 Aug 2017 06:53 )  x     / <0.01 ng/mL / x     / x     / x      CARDIAC MARKERS ( 25 Aug 2017 23:01 )  x     / <0.01 ng/mL / x     / x     / x                                    14.6   4.8   )-----------( 149      ( 27 Aug 2017 09:18 )             44.7     Hemoglobin: 14.6 g/dL (08-27 @ 09:18)  Hemoglobin: 15.3 g/dL (08-25 @ 14:40)            Creatinine Trend: 1.14<--      PHYSICAL EXAM:  T(C): 36.6 (08-28-17 @ 12:05), Max: 36.6 (08-27-17 @ 21:17)  HR: 104 (08-28-17 @ 12:05) (97 - 106)  BP: 109/82 (08-28-17 @ 12:05) (94/68 - 109/82)  RR: 18 (08-28-17 @ 12:05) (18 - 18)  SpO2: 93% (08-28-17 @ 12:05) (93% - 97%)  Wt(kg): --  I&O's Summary    27 Aug 2017 07:01  -  28 Aug 2017 07:00  --------------------------------------------------------  IN: 2280 mL / OUT: 700 mL / NET: 1580 mL    28 Aug 2017 07:01  -  28 Aug 2017 14:57  --------------------------------------------------------  IN: 480 mL / OUT: 0 mL / NET: 480 mL          HEENT:   Normal oral mucosa, PERRL, EOMI	  Lymphatic: No obvious lymphadenopathy , no edema  Cardiovascular: Normal S1 S2, No JVD, 1/6 CELSO murmur, Peripheral pulses palpable 2+ bilaterally  Respiratory: Lungs clear to auscultation, normal effort 	  Gastrointestinal:  Soft, Non-tender, + BS	  Skin: No rashes,  No cyanosis, warm to touch  Musculoskeletal: Normal range of motion, normal strength  Psychiatry:  Appropriate Mood & affect     TELEMETRY: 	  Sinus      ASSESSMENT/PLAN: 	62y Male admitted with SOB, pulmonary edema and elevated BNP  suspected CHF.    - Positive fluid balance overnight will start IV lasix to keep O>I  - F/u echo  - Further recc Pending above.    Jj Irene MD, [facility]  [facility], [facility]  [address] [place] [address]  PHONE:  [phone]  BEEPER : [phone]

## 2017-08-28 NOTE — PROGRESS NOTE ADULT - SUBJECTIVE AND OBJECTIVE BOX
Patient is a 62y old  Male who presents with a chief complaint of SOOB (25 Aug 2017 22:27)      SUBJECTIVE / OVERNIGHT EVENTS:  anxious to leave.  He is short of breatPain in the epigastric area has improved.  He does not require any more pain killers.  Review of Systems:   CONSTITUTIONAL: No fever, weight loss, or fatigue  EYES: No eye pain, visual disturbances, or discharge  ENMT:  No difficulty hearing, tinnitus, vertigo; No sinus or throat pain  NECK: No pain or stiffness  BREASTS: No pain, masses, or nipple discharge  RESPIRATORY: No cough, wheezing, chills or hemoptysis; No shortness of breath  CARDIOVASCULAR: No chest pain, palpitations, dizziness, or leg swelling  GASTROINTESTINAL: No abdominal or epigastric pain. No nausea, vomiting, or hematemesis; No diarrhea or constipation. No melena or hematochezia.  GENITOURINARY: No dysuria, frequency, hematuria, or incontinence  NEUROLOGICAL: No headaches, memory loss, loss of strength, numbness, or tremors  SKIN: No itching, burning, rashes, or lesions   LYMPH NODES: No enlarged glands  ENDOCRINE: No heat or cold intolerance; No hair loss  MUSCULOSKELETAL: No joint pain or swelling; No muscle, back, or extremity pain  PSYCHIATRIC: No depression, anxiety, mood swings, or difficulty sleeping  HEME/LYMPH: No easy bruising, or bleeding gums  ALLERY AND IMMUNOLOGIC: No hives or eczema    MEDICATIONS  (STANDING):  heparin  Injectable 5000 Unit(s) SubCutaneous every 8 hours  pantoprazole    Tablet 40 milliGRAM(s) Oral two times a day before meals  sucralfate suspension 1 Gram(s) Oral four times a day  furosemide   Injectable 40 milliGRAM(s) IV Push two times a day  heparin  Infusion.  Unit(s)/Hr (12 mL/Hr) IV Continuous <Continuous>  heparin  Injectable 5500 Unit(s) IV Push once    MEDICATIONS  (PRN):  ondansetron Injectable 4 milliGRAM(s) IV Push every 6 hours PRN Nausea and/or Vomiting  heparin  Injectable 5500 Unit(s) IV Push every 6 hours PRN For aPTT less than 40  heparin  Injectable 2500 Unit(s) IV Push every 6 hours PRN For aPTT between 40 - 57      PHYSICAL EXAM:  Vital Signs Last 24 Hrs  T(C): 36.6 (28 Aug 2017 12:05), Max: 36.6 (27 Aug 2017 21:17)  T(F): 97.8 (28 Aug 2017 12:05), Max: 97.8 (27 Aug 2017 21:17)  HR: 104 (28 Aug 2017 12:05) (97 - 104)  BP: 109/82 (28 Aug 2017 12:05) (97/67 - 109/82)  BP(mean): --  RR: 18 (28 Aug 2017 12:05) (18 - 18)  SpO2: 93% (28 Aug 2017 12:05) (93% - 97%)  I&O's Summary    27 Aug 2017 07:01  -  28 Aug 2017 07:00  --------------------------------------------------------  IN: 2280 mL / OUT: 700 mL / NET: 1580 mL    28 Aug 2017 07:01  -  28 Aug 2017 17:54  --------------------------------------------------------  IN: 480 mL / OUT: 0 mL / NET: 480 mL      GENERAL: NAD, well-developed  HEAD:  Atraumatic, Normocephalic  EYES: EOMI, PERRLA, conjunctiva and sclera clear  NECK: Supple, No JVD  CHEST/LUNG: Clear to auscultation bilaterally; No wheeze  HEART: Regular rate and rhythm; No murmurs, rubs, or gallops  ABDOMEN: Soft, Nontender, Nondistended; Bowel sounds present  EXTREMITIES:  2+ Peripheral Pulses, No clubbing, cyanosis, or edema  PSYCH: AAOx3  NEUROLOGY: non-focal  SKIN: No rashes or lesions    LABS:  CAPILLARY BLOOD GLUCOSE                              14.6   4.8   )-----------( 149      ( 27 Aug 2017 09:18 )             44.7                 Urinalysis Basic - ( 27 Aug 2017 18:36 )    Color: Yellow / Appearance: Clear / SG: >1.030 / pH: x  Gluc: x / Ketone: Trace  / Bili: Negative / Urobili: Negative   Blood: x / Protein: 30 mg/dL / Nitrite: Negative   Leuk Esterase: Negative / RBC: 2-5 /HPF / WBC 0-2 /HPF   Sq Epi: x / Non Sq Epi: x / Bacteria: x        RADIOLOGY & ADDITIONAL TESTS:    Imaging Personally Reviewed:    Consultant(s) Notes Reviewed:      Care Discussed with Consultants/Other Providers:

## 2017-08-28 NOTE — PROGRESS NOTE ADULT - PROBLEM SELECTOR PLAN 1
no findings on CT  echo reviewed  suspect patients abdominal pain secondary to CHF  diet as tolerated  chf plan per cardiology

## 2017-08-28 NOTE — PROGRESS NOTE ADULT - SUBJECTIVE AND OBJECTIVE BOX
INTERVAL HPI/OVERNIGHT EVENTS:    pain slightly improved  tolerating diet    MEDICATIONS  (STANDING):  heparin  Injectable 5000 Unit(s) SubCutaneous every 8 hours  pantoprazole    Tablet 40 milliGRAM(s) Oral two times a day before meals  sucralfate suspension 1 Gram(s) Oral four times a day  furosemide   Injectable 40 milliGRAM(s) IV Push two times a day    MEDICATIONS  (PRN):  ondansetron Injectable 4 milliGRAM(s) IV Push every 6 hours PRN Nausea and/or Vomiting      Allergies    No Known Allergies    Intolerances        ROS:   General:  No wt loss, fevers, chills, night sweats, fatigue,   Eyes:  Good vision, no reported pain  ENT:  No sore throat, pain, runny nose, dysphagia  CV:  No pain, palpitations, hypo/hypertension  Resp:  No dyspnea, cough, tachypnea, wheezing  GI:  No pain, No nausea, No vomiting, No diarrhea, No constipation, No weight loss, No fever, No pruritis, No rectal bleeding, No tarry stools, No dysphagia,  :  No pain, bleeding, incontinence, nocturia  Muscle:  No pain, weakness  Neuro:  No weakness, tingling, memory problems  Psych:  No fatigue, insomnia, mood problems, depression  Endocrine:  No polyuria, polydipsia, cold/heat intolerance  Heme:  No petechiae, ecchymosis, easy bruisability  Skin:  No rash, tattoos, scars, edema      PHYSICAL EXAM:   Vital Signs:  Vital Signs Last 24 Hrs  T(C): 36.6 (28 Aug 2017 12:05), Max: 36.6 (27 Aug 2017 21:17)  T(F): 97.8 (28 Aug 2017 12:05), Max: 97.8 (27 Aug 2017 21:17)  HR: 104 (28 Aug 2017 12:05) (97 - 104)  BP: 109/82 (28 Aug 2017 12:05) (97/67 - 109/82)  BP(mean): --  RR: 18 (28 Aug 2017 12:05) (18 - 18)  SpO2: 93% (28 Aug 2017 12:05) (93% - 97%)  Daily     Daily Weight in k.5 (28 Aug 2017 04:17)    GENERAL:  Appears stated age, well-groomed, well-nourished, no distress  HEENT:  NC/AT,  conjunctivae clear and pink, no thyromegaly, nodules, adenopathy, no JVD, sclera -anicteric  CHEST:  Full & symmetric excursion, no increased effort, breath sounds clear  HEART:  Regular rhythm, S1, S2, no murmur/rub/S3/S4, no abdominal bruit, no edema  ABDOMEN:  Soft, non-tender, non-distended, normoactive bowel sounds,  no masses ,no hepato-splenomegaly, no signs of chronic liver disease  EXTEREMITIES:  no cyanosis,clubbing or edema  SKIN:  No rash/erythema/ecchymoses/petechiae/wounds/abscess/warm/dry  NEURO:  Alert, oriented, no asterixis, no tremor, no encephalopathy      LABS:                        14.6   4.8   )-----------( 149      ( 27 Aug 2017 09:18 )             44.7             Urinalysis Basic - ( 27 Aug 2017 18:36 )    Color: Yellow / Appearance: Clear / SG: >1.030 / pH: x  Gluc: x / Ketone: Trace  / Bili: Negative / Urobili: Negative   Blood: x / Protein: 30 mg/dL / Nitrite: Negative   Leuk Esterase: Negative / RBC: 2-5 /HPF / WBC 0-2 /HPF   Sq Epi: x / Non Sq Epi: x / Bacteria: x        RADIOLOGY & ADDITIONAL TESTS:

## 2017-08-28 NOTE — CHART NOTE - NSCHARTNOTEFT_GEN_A_CORE
MEDICINE PA   Spectra [id number]     Reviewed ECHO results: endocardial visualization enhanced with intravenous injection of echo contrast (Definity). Severe global left ventricular systolic dysfunction with regional variation  (the mid to distal anterior wall and septum are akinetic). Spontaneous echocardiographic contrast is seen in the left ventricle. Ill-defined, mass at the apex of the left ventricle. May be left ventricular trabeculation or thrombus. Consider MRI to further evaluate.    Pt seen and examined by me. Denies any active bleeding, hematuria, melena, hematochezia or any previous episodes substantial bleeding.     Intervention:   > Heparin gtt ordered  > Cardiac MRI to further investigate LV thrombus vs. trabeculation     Discussed with Dr. Irene and Dr. Segovia.

## 2017-08-29 DIAGNOSIS — F17.200 NICOTINE DEPENDENCE, UNSPECIFIED, UNCOMPLICATED: ICD-10-CM

## 2017-08-29 DIAGNOSIS — J44.9 CHRONIC OBSTRUCTIVE PULMONARY DISEASE, UNSPECIFIED: ICD-10-CM

## 2017-08-29 DIAGNOSIS — I50.21 ACUTE SYSTOLIC (CONGESTIVE) HEART FAILURE: ICD-10-CM

## 2017-08-29 DIAGNOSIS — I51.3 INTRACARDIAC THROMBOSIS, NOT ELSEWHERE CLASSIFIED: ICD-10-CM

## 2017-08-29 DIAGNOSIS — I25.10 ATHEROSCLEROTIC HEART DISEASE OF NATIVE CORONARY ARTERY WITHOUT ANGINA PECTORIS: ICD-10-CM

## 2017-08-29 LAB
ANION GAP SERPL CALC-SCNC: 13 MMOL/L — SIGNIFICANT CHANGE UP (ref 5–17)
APTT BLD: 136.9 SEC — CRITICAL HIGH (ref 27.5–37.4)
APTT BLD: 66 SEC — HIGH (ref 27.5–37.4)
APTT BLD: > 200 SEC (ref 27.5–37.4)
BUN SERPL-MCNC: 39 MG/DL — HIGH (ref 7–23)
CALCIUM SERPL-MCNC: 9.2 MG/DL — SIGNIFICANT CHANGE UP (ref 8.4–10.5)
CHLORIDE SERPL-SCNC: 98 MMOL/L — SIGNIFICANT CHANGE UP (ref 96–108)
CK MB BLD-MCNC: 2.9 % — SIGNIFICANT CHANGE UP (ref 0–3.5)
CK MB CFR SERPL CALC: 3.7 NG/ML — SIGNIFICANT CHANGE UP (ref 0–6.7)
CK SERPL-CCNC: 127 U/L — SIGNIFICANT CHANGE UP (ref 30–200)
CO2 SERPL-SCNC: 26 MMOL/L — SIGNIFICANT CHANGE UP (ref 22–31)
CREAT SERPL-MCNC: 1.26 MG/DL — SIGNIFICANT CHANGE UP (ref 0.5–1.3)
GLUCOSE SERPL-MCNC: 184 MG/DL — HIGH (ref 70–99)
HCT VFR BLD CALC: 45.7 % — SIGNIFICANT CHANGE UP (ref 39–50)
HCT VFR BLD CALC: 51.2 % — HIGH (ref 39–50)
HGB BLD-MCNC: 14.7 G/DL — SIGNIFICANT CHANGE UP (ref 13–17)
HGB BLD-MCNC: 16.2 G/DL — SIGNIFICANT CHANGE UP (ref 13–17)
MAGNESIUM SERPL-MCNC: 2 MG/DL — SIGNIFICANT CHANGE UP (ref 1.6–2.6)
MCHC RBC-ENTMCNC: 31.4 PG — SIGNIFICANT CHANGE UP (ref 27–34)
MCHC RBC-ENTMCNC: 31.6 PG — SIGNIFICANT CHANGE UP (ref 27–34)
MCHC RBC-ENTMCNC: 31.7 GM/DL — LOW (ref 32–36)
MCHC RBC-ENTMCNC: 32.1 GM/DL — SIGNIFICANT CHANGE UP (ref 32–36)
MCV RBC AUTO: 98.5 FL — SIGNIFICANT CHANGE UP (ref 80–100)
MCV RBC AUTO: 99.2 FL — SIGNIFICANT CHANGE UP (ref 80–100)
PHOSPHATE SERPL-MCNC: 3.2 MG/DL — SIGNIFICANT CHANGE UP (ref 2.5–4.5)
PLATELET # BLD AUTO: 150 K/UL — SIGNIFICANT CHANGE UP (ref 150–400)
PLATELET # BLD AUTO: 151 K/UL — SIGNIFICANT CHANGE UP (ref 150–400)
POTASSIUM SERPL-MCNC: 4.2 MMOL/L — SIGNIFICANT CHANGE UP (ref 3.5–5.3)
POTASSIUM SERPL-SCNC: 4.2 MMOL/L — SIGNIFICANT CHANGE UP (ref 3.5–5.3)
RBC # BLD: 4.64 M/UL — SIGNIFICANT CHANGE UP (ref 4.2–5.8)
RBC # BLD: 5.16 M/UL — SIGNIFICANT CHANGE UP (ref 4.2–5.8)
RBC # FLD: 13.8 % — SIGNIFICANT CHANGE UP (ref 10.3–14.5)
RBC # FLD: 14 % — SIGNIFICANT CHANGE UP (ref 10.3–14.5)
SODIUM SERPL-SCNC: 137 MMOL/L — SIGNIFICANT CHANGE UP (ref 135–145)
TROPONIN T SERPL-MCNC: <0.01 NG/ML — SIGNIFICANT CHANGE UP (ref 0–0.06)
WBC # BLD: 7 K/UL — SIGNIFICANT CHANGE UP (ref 3.8–10.5)
WBC # BLD: 7.4 K/UL — SIGNIFICANT CHANGE UP (ref 3.8–10.5)
WBC # FLD AUTO: 7 K/UL — SIGNIFICANT CHANGE UP (ref 3.8–10.5)
WBC # FLD AUTO: 7.4 K/UL — SIGNIFICANT CHANGE UP (ref 3.8–10.5)

## 2017-08-29 RX ORDER — TIOTROPIUM BROMIDE 18 UG/1
1 CAPSULE ORAL; RESPIRATORY (INHALATION) DAILY
Qty: 0 | Refills: 0 | Status: DISCONTINUED | OUTPATIENT
Start: 2017-08-29 | End: 2017-09-12

## 2017-08-29 RX ORDER — ASPIRIN/CALCIUM CARB/MAGNESIUM 324 MG
81 TABLET ORAL DAILY
Qty: 0 | Refills: 0 | Status: DISCONTINUED | OUTPATIENT
Start: 2017-08-29 | End: 2017-09-02

## 2017-08-29 RX ORDER — BUDESONIDE AND FORMOTEROL FUMARATE DIHYDRATE 160; 4.5 UG/1; UG/1
2 AEROSOL RESPIRATORY (INHALATION)
Qty: 0 | Refills: 0 | Status: DISCONTINUED | OUTPATIENT
Start: 2017-08-29 | End: 2017-08-31

## 2017-08-29 RX ORDER — DOCUSATE SODIUM 100 MG
100 CAPSULE ORAL
Qty: 0 | Refills: 0 | Status: DISCONTINUED | OUTPATIENT
Start: 2017-08-29 | End: 2017-09-01

## 2017-08-29 RX ORDER — ATORVASTATIN CALCIUM 80 MG/1
20 TABLET, FILM COATED ORAL AT BEDTIME
Qty: 0 | Refills: 0 | Status: DISCONTINUED | OUTPATIENT
Start: 2017-08-29 | End: 2017-09-12

## 2017-08-29 RX ORDER — METOPROLOL TARTRATE 50 MG
25 TABLET ORAL DAILY
Qty: 0 | Refills: 0 | Status: DISCONTINUED | OUTPATIENT
Start: 2017-08-29 | End: 2017-09-01

## 2017-08-29 RX ADMIN — TIOTROPIUM BROMIDE 1 CAPSULE(S): 18 CAPSULE ORAL; RESPIRATORY (INHALATION) at 20:26

## 2017-08-29 RX ADMIN — HEPARIN SODIUM 800 UNIT(S)/HR: 5000 INJECTION INTRAVENOUS; SUBCUTANEOUS at 20:24

## 2017-08-29 RX ADMIN — Medication 1 GRAM(S): at 05:20

## 2017-08-29 RX ADMIN — PANTOPRAZOLE SODIUM 40 MILLIGRAM(S): 20 TABLET, DELAYED RELEASE ORAL at 05:20

## 2017-08-29 RX ADMIN — Medication 81 MILLIGRAM(S): at 19:01

## 2017-08-29 RX ADMIN — PANTOPRAZOLE SODIUM 40 MILLIGRAM(S): 20 TABLET, DELAYED RELEASE ORAL at 19:01

## 2017-08-29 RX ADMIN — Medication 1 GRAM(S): at 19:01

## 2017-08-29 RX ADMIN — BUDESONIDE AND FORMOTEROL FUMARATE DIHYDRATE 2 PUFF(S): 160; 4.5 AEROSOL RESPIRATORY (INHALATION) at 19:16

## 2017-08-29 RX ADMIN — Medication 40 MILLIGRAM(S): at 05:20

## 2017-08-29 RX ADMIN — HEPARIN SODIUM 800 UNIT(S)/HR: 5000 INJECTION INTRAVENOUS; SUBCUTANEOUS at 12:47

## 2017-08-29 RX ADMIN — ATORVASTATIN CALCIUM 20 MILLIGRAM(S): 80 TABLET, FILM COATED ORAL at 21:32

## 2017-08-29 RX ADMIN — HEPARIN SODIUM 0 UNIT(S)/HR: 5000 INJECTION INTRAVENOUS; SUBCUTANEOUS at 11:40

## 2017-08-29 RX ADMIN — Medication 40 MILLIGRAM(S): at 19:01

## 2017-08-29 RX ADMIN — HEPARIN SODIUM 0 UNIT(S)/HR: 5000 INJECTION INTRAVENOUS; SUBCUTANEOUS at 01:46

## 2017-08-29 RX ADMIN — Medication 100 MILLIGRAM(S): at 19:02

## 2017-08-29 RX ADMIN — HEPARIN SODIUM 1000 UNIT(S)/HR: 5000 INJECTION INTRAVENOUS; SUBCUTANEOUS at 02:48

## 2017-08-29 RX ADMIN — Medication 25 MILLIGRAM(S): at 19:01

## 2017-08-29 RX ADMIN — Medication 1 GRAM(S): at 15:51

## 2017-08-29 NOTE — PHYSICAL THERAPY INITIAL EVALUATION ADULT - PERTINENT HX OF CURRENT PROBLEM, REHAB EVAL
62 y/oM admitted 8/25 with chest pain x 1wk. Pleuritic, worsens with cough and deep inspiration. CTneg for PE, pulm congestion noted. Hosp course- pt with c/o abdominal pain. TTE 8/28 with mass at apex of L ventricle, may be ventricular trabeculation or thrombus, recommending MRI to further evaluate. 62 y/oM admitted 8/25 with chest pain x 1wk. Pleuritic, worsens with cough and deep inspiration. CT neg for PE, pulm congestion noted. Hosp course- pt with c/o abdominal pain. TTE 8/28 with mass at apex of L ventricle, may be ventricular trabeculation or thrombus, recommending MRI to further evaluate. On heparin. As per MD, pt with CHF 62 y/oM admitted 8/25 with chest pain x 1wk. Pleuritic, worsens with cough and deep inspiration. CT neg for PE, pulm congestion noted. Hosp course- pt with c/o abdominal pain. TTE 8/28 with mass at apex of L ventricle, may be ventricular trabeculation or thrombus, recommending MRI to further evaluate. On heparin. As per conversation with medicine MD Segovia, pt's symptoms d/t CHF, heart functioning at 10%.

## 2017-08-29 NOTE — PROVIDER CONTACT NOTE (OTHER) - SITUATION
During routine vitals it was discovered that the pt had a low BP of 90/70 that was manually performed.

## 2017-08-29 NOTE — CHART NOTE - NSCHARTNOTEFT_GEN_A_CORE
HPI: Pt seen and examined for atypical chest pain. Pt is Creole speaking,  # [id number] used. Pt appears distressed, pointing epigastric, super umbilical region. Verbalizes pain is sharp and constant. Pt denies any lightheadedness, N/V, SOB, palpitations.       Vital Signs Last 24 Hrs  T(C): 36.3 (28 Aug 2017 22:59), Max: 36.7 (28 Aug 2017 20:50)  T(F): 97.3 (28 Aug 2017 22:59), Max: 98 (28 Aug 2017 20:50)  HR: 108 (28 Aug 2017 22:59) (102 - 108)  BP: 107/76 (28 Aug 2017 22:59) (90/70 - 109/82)  BP(mean): --  RR: 18 (28 Aug 2017 22:59) (18 - 18)  SpO2: 99% (28 Aug 2017 22:59) (93% - 100%)  HEALTH ISSUES - PROBLEM Dx:  Bilious vomiting with nausea: Bilious vomiting with nausea  Abdominal pain, unspecified location: Abdominal pain, unspecified location  Other chest pain: Other chest pain        I&O's Detail    27 Aug 2017 07:01  -  28 Aug 2017 07:00  --------------------------------------------------------  IN:    Oral Fluid: 1080 mL    sodium chloride 0.9%: 1200 mL  Total IN: 2280 mL    OUT:    Voided: 700 mL  Total OUT: 700 mL    Total NET: 1580 mL      28 Aug 2017 07:01  -  29 Aug 2017 00:58  --------------------------------------------------------  IN:    Oral Fluid: 480 mL  Total IN: 480 mL    OUT:    Voided: 1350 mL  Total OUT: 1350 mL    Total NET: -870 mL        I&O's Summary    27 Aug 2017 07:01  -  28 Aug 2017 07:00  --------------------------------------------------------  IN: 2280 mL / OUT: 700 mL / NET: 1580 mL    28 Aug 2017 07:01  -  29 Aug 2017 00:58  --------------------------------------------------------  IN: 480 mL / OUT: 1350 mL / NET: -870 mL      MEDICATIONS  (STANDING):  pantoprazole    Tablet 40 milliGRAM(s) Oral two times a day before meals  sucralfate suspension 1 Gram(s) Oral four times a day  furosemide   Injectable 40 milliGRAM(s) IV Push two times a day  heparin  Infusion.  Unit(s)/Hr (12 mL/Hr) IV Continuous <Continuous>    MEDICATIONS  (PRN):  ondansetron Injectable 4 milliGRAM(s) IV Push every 6 hours PRN Nausea and/or Vomiting  heparin  Injectable 5500 Unit(s) IV Push every 6 hours PRN For aPTT less than 40  heparin  Injectable 2500 Unit(s) IV Push every 6 hours PRN For aPTT between 40 - 57        PHYSICAL EXAM:      Constitutional: appears slightly distressed d/t pain    Respiratory: b/l basilar crackles, no wheezing    Cardiovascular: S1, S2 regular    Gastrointestinal: + tenderness noted to the epigastric region, and generalized to the abdomen, ND, + hypoactive B.S>      Extremities: moves all extremities     Vascular: + pules to all extremities     Skin: warm and dry    CARDIAC MARKERS ( 28 Aug 2017 23:57 )  x     / <0.01 ng/mL / 127 U/L / x     / 3.7 ng/mL                          14.7   7.0   )-----------( 150      ( 29 Aug 2017 00:38 )             45.7              < from: TTE with Doppler (w/Cont) (08.28.17 @ 22:35) >    Conclusions:  1. Thickened mitral valve. Dilated mitral valve annulus.  Tethered posterior leaflet.  Severe, eccentric,  posteriorly-directed mitral regurgitation. Flow reversal is  seen in the pulmonary veins.  2. Thickened aortic valve.  3. Severely dilated left atrium.  4. Moderate to severe left ventricular enlargement.  5. Endocardial visualization enhanced with intravenous  injection of echo contrast (Definity). Severe global left  ventricular systolic dysfunction with regional variation  (the mid to distal anterior wall and septum are akinetic).  Spontaneous echocardiographic contrast is seen in the left  ventricle. Ill-defined, mass at the apex of the left  ventricle. May be left ventricular trabeculation or  thrombus. Consider MRI to further evaluate.  6. E/e' equals 20, consistent with elevated left  ventricular end diastolic pressure.  7. Moderate right atrial enlargement.  8. Right ventricular enlargement with decreased right  ventricular systolic function.  Diastolic septal flattening  consistent with right ventricular volume overload.  9. Thickened tricuspid valve. Moderate-severe tricuspid  regurgitation.  10. Trace pericardial effusion.    < end of copied text >  < from: CT Abdomen and Pelvis w/ IV Cont (08.26.17 @ 18:38) >    Cardiomegaly and bilateral pleural effusions, unchanged.    No CT evidence of bowel obstruction or colitis.    Mild stenosis of both common iliac arteries.    < end of copied text >         A/P: 62M with no PMHx here with chest pain. Began last week, described as pleuritic, worsens with cough and deep inspiration. Has been having SOB as well, dry cough. Recently vacationed in [facility] came back today. Saw doctor in [facility] but unclear what workup was.  Has 25 pack year smoking history. CTPA does not reveal PE, Pulm congestion noted. (25 Aug 2017 22:27) Echo with noted possible LV thrombus, on Heparin gtt. Pt now with atypical chest pain, appears GI, however CT of a/p with no significant findings.   - CE x 1 sent, continues to have Trop < 0.01  - 12 lead EKG done with < 5 mm ST elevation in V3, V4, appears the same compared to serial EKGs done prior ( no previous EKG available pre admission)  - Morphine 1 mg IVP x 1 given, pt presently resting   - c/w Hep gtt for LV thrombus   - As per Cards, possible ischemic demand d/t pleural effusion, c/w Lasix I.V.   - f/u Cards     Vida Tipton, PATIENCE   [id number]

## 2017-08-29 NOTE — CONSULT NOTE ADULT - PROBLEM SELECTOR RECOMMENDATION 2
has systolic + diastolic CHF: with ? LV thrombus: on IV heparin and  being treated with IV lasix twice a day: The desaturation in the night is likely due to a combincation of CHF, PLEURAL EFFUSION, COPD AND CENTRAL SLEEP APNEA: He would benefit from outpatient PFT and if we can demonstrate continuous desaturation of < 88 for more then 5 minutes in night , he would be eligible for home oxygen. Anyway we can check his room air o2 sao2 upon exercise for 5-6 minutes ( walking) to see if he needs home oxygen: this can be done prior to discharge, for now continue to treat his CHF exacerbation and his caridac management: Has severe Mitral Regurgitation . will do morning abg too!!

## 2017-08-29 NOTE — PROGRESS NOTE ADULT - SUBJECTIVE AND OBJECTIVE BOX
Patient denies CP, Breathing Better ROS otherwise (-)  	  ondansetron Injectable 4 milliGRAM(s) IV Push every 6 hours PRN  pantoprazole    Tablet 40 milliGRAM(s) Oral two times a day before meals  sucralfate suspension 1 Gram(s) Oral four times a day  furosemide   Injectable 40 milliGRAM(s) IV Push two times a day  heparin  Infusion.  Unit(s)/Hr IV Continuous <Continuous>  heparin  Injectable 5500 Unit(s) IV Push every 6 hours PRN  heparin  Injectable 2500 Unit(s) IV Push every 6 hours PRN                            16.2   7.4   )-----------( 151      ( 29 Aug 2017 09:56 )             51.2       Hemoglobin: 16.2 g/dL (08-29 @ 09:56)  Hemoglobin: 14.7 g/dL (08-29 @ 00:38)  Hemoglobin: 14.6 g/dL (08-27 @ 09:18)  Hemoglobin: 15.3 g/dL (08-25 @ 14:40)            Creatinine Trend: 1.14<--    COAGS: PTT - ( 29 Aug 2017 10:57 )  PTT:136.9 sec    CARDIAC MARKERS ( 28 Aug 2017 23:57 )  x     / <0.01 ng/mL / 127 U/L / x     / 3.7 ng/mL        T(C): 36.6 (08-29-17 @ 12:30), Max: 36.8 (08-29-17 @ 04:40)  HR: 99 (08-29-17 @ 12:30) (92 - 109)  BP: 104/75 (08-29-17 @ 12:30) (90/70 - 108/79)  RR: 17 (08-29-17 @ 12:30) (17 - 18)  SpO2: 98% (08-29-17 @ 12:30) (95% - 100%)  Wt(kg): --    I&O's Summary    28 Aug 2017 07:01  -  29 Aug 2017 07:00  --------------------------------------------------------  IN: 579 mL / OUT: 2100 mL / NET: -1521 mL    29 Aug 2017 07:01  -  29 Aug 2017 15:58  --------------------------------------------------------  IN: 480 mL / OUT: 650 mL / NET: -170 mL          HEENT:   Normal oral mucosa, PERRL, EOMI	  Lymphatic: No obvious lymphadenopathy , no edema  Cardiovascular: Normal S1 S2, No JVD, 1/6 CELSO murmur, Peripheral pulses palpable 2+ bilaterally  Respiratory: Lungs clear to auscultation, normal effort 	  Gastrointestinal:  Soft, Non-tender, + BS	  Skin: No rashes,  No cyanosis, warm to touch  Musculoskeletal: Normal range of motion, normal strength  Psychiatry:  Appropriate Mood & affect     TELEMETRY: 	  Sinus      ASSESSMENT/PLAN: 	62y Male admitted with SOB, pulmonary edema and elevated BNP  new sever LV dysfx, ? LV thrombus, severe MR    - cont heparin gtt  - F/U cardiac MRI  - Eventual r+L cath  - Start Toprol XL 25 mg PO daily  - ACE once BP allows.  - Given segmental wall motion abnormality on echo will start ASA and statin for presumed CAD    Jj Irene MD, FACC  [facility], [facility]  [address] [place] [address]  PHONE:  [phone]  BEEPER : [phone]

## 2017-08-29 NOTE — CHART NOTE - NSCHARTNOTEFT_GEN_A_CORE
[id number]  JOINT, RICKY    Notified by RN patient with critical lab result of PTT of 139.6.     Patient seen and examined, with no overt signs of bleeding.  No complaints at this time     Plan of Care/ Interventions:   Dose rate decrease according to heparin gtt nomogram

## 2017-08-29 NOTE — PROGRESS NOTE ADULT - SUBJECTIVE AND OBJECTIVE BOX
Patient is a 62y old  Male who presents with a chief complaint of SOOB (25 Aug 2017 22:27)      SUBJECTIVE / OVERNIGHT EVENTS:  Patient was spoken with in creole using  for. He claims he does not understand his medical issues. He thinks he will be able to go back home today. He denies any shortness of breath or chest pain at this time. He is tolerating intravenous heparin for now. Of note, the floor Nurse practitioners had discussed the patient his medical condition yesterday in his native language.  The bedside nurse reports episodes of Oxygen desaturation up to 79%.    Review of Systems:   CONSTITUTIONAL: No fever, weight loss, or fatigue  EYES: No eye pain, visual disturbances, or discharge  ENMT:  No difficulty hearing, tinnitus, vertigo; No sinus or throat pain  NECK: No pain or stiffness  BREASTS: No pain, masses, or nipple discharge  RESPIRATORY: No cough, wheezing, chills or hemoptysis; No shortness of breath  CARDIOVASCULAR: No chest pain, palpitations, dizziness, or leg swelling  GASTROINTESTINAL: No abdominal or epigastric pain. No nausea, vomiting, or hematemesis; No diarrhea or constipation. No melena or hematochezia.  GENITOURINARY: No dysuria, frequency, hematuria, or incontinence  NEUROLOGICAL: No headaches, memory loss, loss of strength, numbness, or tremors  SKIN: No itching, burning, rashes, or lesions   LYMPH NODES: No enlarged glands  ENDOCRINE: No heat or cold intolerance; No hair loss  MUSCULOSKELETAL: No joint pain or swelling; No muscle, back, or extremity pain  PSYCHIATRIC: No depression, anxiety, mood swings, or difficulty sleeping  HEME/LYMPH: No easy bruising, or bleeding gums  ALLERY AND IMMUNOLOGIC: No hives or eczema    MEDICATIONS  (STANDING):  pantoprazole    Tablet 40 milliGRAM(s) Oral two times a day before meals  sucralfate suspension 1 Gram(s) Oral four times a day  furosemide   Injectable 40 milliGRAM(s) IV Push two times a day  heparin  Infusion.  Unit(s)/Hr (12 mL/Hr) IV Continuous <Continuous>  metoprolol succinate ER 25 milliGRAM(s) Oral daily  aspirin  chewable 81 milliGRAM(s) Oral daily  atorvastatin 20 milliGRAM(s) Oral at bedtime  buDESOnide 160 MICROgram(s)/formoterol 4.5 MICROgram(s) Inhaler 2 Puff(s) Inhalation two times a day  tiotropium 18 MICROgram(s) Capsule 1 Capsule(s) Inhalation daily  docusate sodium 100 milliGRAM(s) Oral two times a day    MEDICATIONS  (PRN):  ondansetron Injectable 4 milliGRAM(s) IV Push every 6 hours PRN Nausea and/or Vomiting  heparin  Injectable 5500 Unit(s) IV Push every 6 hours PRN For aPTT less than 40  heparin  Injectable 2500 Unit(s) IV Push every 6 hours PRN For aPTT between 40 - 57      PHYSICAL EXAM:  Vital Signs Last 24 Hrs  T(C): 36.3 (29 Aug 2017 19:18), Max: 36.8 (29 Aug 2017 04:40)  T(F): 97.4 (29 Aug 2017 19:18), Max: 98.3 (29 Aug 2017 04:40)  HR: 106 (29 Aug 2017 19:18) (92 - 109)  BP: 103/78 (29 Aug 2017 19:18) (90/70 - 109/79)  BP(mean): --  RR: 18 (29 Aug 2017 19:18) (17 - 18)  SpO2: 99% (29 Aug 2017 19:18) (95% - 99%)  I&O's Summary    28 Aug 2017 07:01  -  29 Aug 2017 07:00  --------------------------------------------------------  IN: 579 mL / OUT: 2100 mL / NET: -1521 mL    29 Aug 2017 07:01  -  29 Aug 2017 20:54  --------------------------------------------------------  IN: 716 mL / OUT: 650 mL / NET: 66 mL      GENERAL: NAD, well-developed  HEAD:  Atraumatic, Normocephalic  EYES: EOMI, PERRLA, conjunctiva and sclera clear  NECK: Supple, No JVD  CHEST/LUNG: Clear to auscultation bilaterally; No wheeze  HEART: Regular rate and rhythm; No murmurs, rubs, or gallops  ABDOMEN: Soft, Nontender, Nondistended; Bowel sounds present  EXTREMITIES:  2+ Peripheral Pulses, No clubbing, cyanosis, or edema  PSYCH: AAOx3  NEUROLOGY: non-focal  SKIN: No rashes or lesions    LABS:  CAPILLARY BLOOD GLUCOSE                              16.2   7.4   )-----------( 151      ( 29 Aug 2017 09:56 )             51.2           PTT - ( 29 Aug 2017 19:21 )  PTT:66.0 sec  CARDIAC MARKERS ( 28 Aug 2017 23:57 )  x     / <0.01 ng/mL / 127 U/L / x     / 3.7 ng/mL          RADIOLOGY & ADDITIONAL TESTS:    Imaging Personally Reviewed:    Consultant(s) Notes Reviewed:    2y Male admitted with SOB, pulmonary edema and elevated BNP  new sever LV dysfx, ? LV thrombus, severe MR    - cont heparin gtt  - F/U cardiac MRI  - Eventual r+L cath  - Start Toprol XL 25 mg PO daily  - ACE once BP allows.  - Given segmental wall motion abnormality on echo will start ASA and statin for presumed CAD      Care Discussed with Consultants/Other Providers:

## 2017-08-29 NOTE — PROGRESS NOTE ADULT - SUBJECTIVE AND OBJECTIVE BOX
INTERVAL HPI/OVERNIGHT EVENTS:      still having abdominal pain  tolerating diet    MEDICATIONS  (STANDING):  heparin  Injectable 5000 Unit(s) SubCutaneous every 8 hours  pantoprazole    Tablet 40 milliGRAM(s) Oral two times a day before meals  sucralfate suspension 1 Gram(s) Oral four times a day  furosemide   Injectable 40 milliGRAM(s) IV Push two times a day    MEDICATIONS  (PRN):  ondansetron Injectable 4 milliGRAM(s) IV Push every 6 hours PRN Nausea and/or Vomiting      Allergies    No Known Allergies    Intolerances        ROS:   General:  No wt loss, fevers, chills, night sweats, fatigue,   Eyes:  Good vision, no reported pain  ENT:  No sore throat, pain, runny nose, dysphagia  CV:  No pain, palpitations, hypo/hypertension  Resp:  No dyspnea, cough, tachypnea, wheezing  GI:  No pain, No nausea, No vomiting, No diarrhea, No constipation, No weight loss, No fever, No pruritis, No rectal bleeding, No tarry stools, No dysphagia,  :  No pain, bleeding, incontinence, nocturia  Muscle:  No pain, weakness  Neuro:  No weakness, tingling, memory problems  Psych:  No fatigue, insomnia, mood problems, depression  Endocrine:  No polyuria, polydipsia, cold/heat intolerance  Heme:  No petechiae, ecchymosis, easy bruisability  Skin:  No rash, tattoos, scars, edema      PHYSICAL EXAM:   Vital Signs:  Vital Signs Last 24 Hrs  T(C): 36.6 (28 Aug 2017 12:05), Max: 36.6 (27 Aug 2017 21:17)  T(F): 97.8 (28 Aug 2017 12:05), Max: 97.8 (27 Aug 2017 21:17)  HR: 104 (28 Aug 2017 12:05) (97 - 104)  BP: 109/82 (28 Aug 2017 12:05) (97/67 - 109/82)  BP(mean): --  RR: 18 (28 Aug 2017 12:05) (18 - 18)  SpO2: 93% (28 Aug 2017 12:05) (93% - 97%)  Daily     Daily Weight in k.5 (28 Aug 2017 04:17)    GENERAL:  Appears stated age, well-groomed, well-nourished, no distress  HEENT:  NC/AT,  conjunctivae clear and pink, no thyromegaly, nodules, adenopathy, no JVD, sclera -anicteric  CHEST:  Full & symmetric excursion, no increased effort, breath sounds clear  HEART:  Regular rhythm, S1, S2, no murmur/rub/S3/S4, no abdominal bruit, no edema  ABDOMEN:  Soft, non-tender, non-distended, normoactive bowel sounds,  no masses ,no hepato-splenomegaly, no signs of chronic liver disease  EXTEREMITIES:  no cyanosis,clubbing or edema  SKIN:  No rash/erythema/ecchymoses/petechiae/wounds/abscess/warm/dry  NEURO:  Alert, oriented, no asterixis, no tremor, no encephalopathy      LABS:                        14.6   4.8   )-----------( 149      ( 27 Aug 2017 09:18 )             44.7             Urinalysis Basic - ( 27 Aug 2017 18:36 )    Color: Yellow / Appearance: Clear / SG: >1.030 / pH: x  Gluc: x / Ketone: Trace  / Bili: Negative / Urobili: Negative   Blood: x / Protein: 30 mg/dL / Nitrite: Negative   Leuk Esterase: Negative / RBC: 2-5 /HPF / WBC 0-2 /HPF   Sq Epi: x / Non Sq Epi: x / Bacteria: x        RADIOLOGY & ADDITIONAL TESTS:

## 2017-08-29 NOTE — CONSULT NOTE ADULT - PROBLEM SELECTOR RECOMMENDATION 9
pt is a smoker and has evidence of emphysema on CT scan chest: start symbicort and spiriva with albuterol prn

## 2017-08-29 NOTE — PROGRESS NOTE ADULT - PROBLEM SELECTOR PLAN 2
He is on IV heparin for now. Cardiac MRI pending. Depending on the results, he may need Cardiac catheterization to look For coronary artery disease causing the acute Heart failure.

## 2017-08-29 NOTE — PROVIDER CONTACT NOTE (OTHER) - SITUATION
Pt had a complaint of chest pain, pointing to the epigastric region, pt currently states pain is a ten out of ten via  phone.

## 2017-08-29 NOTE — CONSULT NOTE ADULT - SUBJECTIVE AND OBJECTIVE BOX
I have seen and examined the patient and concur with the history. To lexx he says till two weeks ago, he was smoking and quit as he started having dry cough a lot: Her does feel PEÑA:     POOR HISTORIAN    he denies having any lung issues and recently he visited a physician in [place] for ? unclear reasons:  He has a  hx of sleep apnea and he desats at night per NP and hence pulmonary consult was called.       Patient is a 62y old  Male who presents with a chief complaint of SOB (25 Aug 2017 22:27)      HPI:  62M with no PMHx here with chest pain. Began last week, described as pleuritic, worsens with cough and deep inspiration. Has been having SOB as well, dry cough. Recently vacationed in [place] came back today. Saw doctor in [place] but unclear what workup was done. No fever, sick contacts, abd pain. CP described as midsternal, nonradiating. Has 25 pack year smoking history.  CTPA does not reveal PE, Pulm congestion noted. (25 Aug 2017 22:27)      ?FOLLOWING PRESENT  [x Hx of PE/DVT, [x ] Hx COPD, [x] Hx of Asthma, x] Hx of Hospitalization, [ x]  Hx of BiPAP/CPAP use, [ xHx of SIL    Allergies    No Known Allergies    Intolerances        PAST MEDICAL & SURGICAL HISTORY:  No pertinent past medical history  No significant past surgical history      FAMILY HISTORY:  No pertinent family history in first degree relatives      Social History: [ yes ] TOBACCO  > 20 years : quit two weeks ago                 [x  ] ETOH                                 x] IVDA/DRUGS    REVIEW OF SYSTEMS      General:	x    Skin/Breast:x  	  Ophthalmologic:x  	  ENMT:	x    Respiratory and Thorax: sob, PEÑA, cough  	  Cardiovascular:	x    Gastrointestinal:	x    Genitourinary:	x    Musculoskeletal:x	    Neurological:x	    Psychiatric:x	    Hematology/Lymphatics:	x    Endocrine:	x    Allergic/Immunologic:	x    MEDICATIONS  (STANDING):  pantoprazole    Tablet 40 milliGRAM(s) Oral two times a day before meals  sucralfate suspension 1 Gram(s) Oral four times a day  furosemide   Injectable 40 milliGRAM(s) IV Push two times a day  heparin  Infusion.  Unit(s)/Hr (12 mL/Hr) IV Continuous <Continuous>  metoprolol succinate ER 25 milliGRAM(s) Oral daily  aspirin  chewable 81 milliGRAM(s) Oral daily  atorvastatin 20 milliGRAM(s) Oral at bedtime    MEDICATIONS  (PRN):  ondansetron Injectable 4 milliGRAM(s) IV Push every 6 hours PRN Nausea and/or Vomiting  heparin  Injectable 5500 Unit(s) IV Push every 6 hours PRN For aPTT less than 40  heparin  Injectable 2500 Unit(s) IV Push every 6 hours PRN For aPTT between 40 - 57       Vital Signs Last 24 Hrs  T(C): 36.6 (29 Aug 2017 12:30), Max: 36.8 (29 Aug 2017 04:40)  T(F): 97.8 (29 Aug 2017 12:30), Max: 98.3 (29 Aug 2017 04:40)  HR: 99 (29 Aug 2017 12:30) (92 - 109)  BP: 104/75 (29 Aug 2017 12:30) (90/70 - 108/79)  BP(mean): --  RR: 17 (29 Aug 2017 12:30) (17 - 18)  SpO2: 98% (29 Aug 2017 12:30) (95% - 100%)        I&O's Summary    28 Aug 2017 07:01  -  29 Aug 2017 07:00  --------------------------------------------------------  IN: 579 mL / OUT: 2100 mL / NET: -1521 mL    29 Aug 2017 07:01  -  29 Aug 2017 17:01  --------------------------------------------------------  IN: 480 mL / OUT: 650 mL / NET: -170 mL        Physical Exam:   GENERAL: NAD, well-groomed, well-developed  HEENT: ALONSO/   Atraumatic, Normocephalic  ENMT: No tonsillar erythema, exudates, or enlargement; Moist mucous membranes, Good dentition, No lesions  NECK: Supple, No JVD, Normal thyroid  CHEST/LUNG: poor air entry bilaterally  CVS: Regular rate and rhythm; No murmurs, rubs, or gallops  GI: : Soft, Nontender, Nondistended; Bowel sounds present  NERVOUS SYSTEM:  Alert & Oriented X3  EXTREMITIES:  2+ Peripheral Pulses, No clubbing, cyanosis, or edema  LYMPH: No lymphadenopathy noted  SKIN: No rashes or lesions  ENDOCRINOLOGY: No Thyromegaly  PSYCH: Appropriate    Labs:  Venous<57<4>>18<<7.335>>Venous<<3><<4><<5<<189>>  CARDIAC MARKERS ( 28 Aug 2017 23:57 )  x     / <0.01 ng/mL / 127 U/L / x     / 3.7 ng/mL                            16.2   7.4   )-----------( 151      ( 29 Aug 2017 09:56 )             51.2                         14.7   7.0   )-----------( 150      ( 29 Aug 2017 00:38 )             45.7                         14.6   4.8   )-----------( 149      ( 27 Aug 2017 09:18 )             44.7           CAPILLARY BLOOD GLUCOSE          PTT - ( 29 Aug 2017 10:57 )  PTT:136.9 sec  Urinalysis Basic - ( 27 Aug 2017 18:36 )    Color: Yellow / Appearance: Clear / SG: >1.030 / pH: x  Gluc: x / Ketone: Trace  / Bili: Negative / Urobili: Negative   Blood: x / Protein: 30 mg/dL / Nitrite: Negative   Leuk Esterase: Negative / RBC: 2-5 /HPF / WBC 0-2 /HPF   Sq Epi: x / Non Sq Epi: x / Bacteria: x      D DImer  D-Dimer Assay, Quantitative: 4027 ng/mL DDU (08-25 @ 14:41)    Cultures:                       Rapid Respiratory Viral Panel Result        08-25 @ 15:06  Rapid RVP NotDete  Coronovirus --  Adenovirus --  Bordetella Pertussis --  Chlamydia Pneumonia --  Entero/Rhinovirus--  HKU1 Coronovirus --  HMPV Coronovirus --  Influenza A --  Influenza AH1 --  Influenza AH1 2009 --  Influenza AH3 --  Influenza B --  Mycoplasma Pneumoniae --  NL63 Coronovirus --  OC43 Coronovirus --  Parainfluenza 1 --  Parainfluenza 2 --  Parainfluenza 3 --  Parainfluenza 4 --  Resp Syncytial Virus --        Studies  Chest X-RAY  CT SCAN Chest   CT Abdomen  Venous Dopplers: LE:   Others      < from: CT Angio Chest w/ IV Cont (08.25.17 @ 21:07) >  CHEST WALL AND LOWER NECK: Within normal limits.  VISUALIZED UPPER ABDOMEN: Within normal limits.  BONES: Within normal limits.    IMPRESSION:     No pulmonary embolism.    Mild pulmonary edema with small moderate bilateral pleural effusions.    TARYN FIERRO M.D., RADIOLOGY RESIDENT  This document has been electronically signed.  ESME BENDER M.D., ATTENDING RADIOLOGIST  This document has been electronically signed. Aug 26 2017  8:57AM    < end of copied text >      < from: TTE with Doppler (w/Cont) (08.28.17 @ 22:35) >  Bilateral pleural effusions.  ------------------------------------------------------------------------  Conclusions:  1. Thickened mitral valve. Dilated mitral valve annulus.  Tethered posterior leaflet.  Severe, eccentric,  posteriorly-directed mitral regurgitation. Flow reversal is  seen in the pulmonary veins.  2. Thickened aortic valve.  3. Severely dilated left atrium.  4. Moderate to severe left ventricular enlargement.  5. Endocardial visualization enhanced with intravenous  injection of echo contrast (Definity). Severe global left  ventricular systolic dysfunction with regional variation  (the mid to distal anterior wall and septum are akinetic).  Spontaneous echocardiographic contrast is seen in the left  ventricle. Ill-defined, mass at the apex of the left  ventricle. May be left ventricular trabeculation or  thrombus. Consider MRI to further evaluate.  6. E/e' equals 20, consistent with elevated left  ventricular end diastolic pressure.  7. Moderate right atrial enlargement.  8. Right ventricular enlargement with decreased right  ventricular systolic function.  Diastolic septal flattening  consistent with right ventricular volume overload.  9. Thickened tricuspid valve. Moderate-severe tricuspid  regurgitation.  10. Trace pericardial effusion.  ------------------------------------------------------------------------  Confirmed on  8/28/2017 - 15:54:57 by Matias Stubbs M.D.  ------------------------------------------------------------------------    < end of copied text >

## 2017-08-30 DIAGNOSIS — J43.9 EMPHYSEMA, UNSPECIFIED: ICD-10-CM

## 2017-08-30 DIAGNOSIS — I50.9 HEART FAILURE, UNSPECIFIED: ICD-10-CM

## 2017-08-30 LAB
ANION GAP SERPL CALC-SCNC: 15 MMOL/L — SIGNIFICANT CHANGE UP (ref 5–17)
APTT BLD: 50.1 SEC — HIGH (ref 27.5–37.4)
APTT BLD: 71.8 SEC — HIGH (ref 27.5–37.4)
APTT BLD: 87.1 SEC — HIGH (ref 27.5–37.4)
BASE EXCESS BLDA CALC-SCNC: 3.7 MMOL/L — HIGH (ref -2–2)
BUN SERPL-MCNC: 39 MG/DL — HIGH (ref 7–23)
CALCIUM SERPL-MCNC: 9.3 MG/DL — SIGNIFICANT CHANGE UP (ref 8.4–10.5)
CHLORIDE SERPL-SCNC: 98 MMOL/L — SIGNIFICANT CHANGE UP (ref 96–108)
CO2 BLDA-SCNC: 29 MMOL/L — SIGNIFICANT CHANGE UP (ref 22–30)
CO2 SERPL-SCNC: 22 MMOL/L — SIGNIFICANT CHANGE UP (ref 22–31)
CREAT SERPL-MCNC: 1.21 MG/DL — SIGNIFICANT CHANGE UP (ref 0.5–1.3)
GAS PNL BLDA: SIGNIFICANT CHANGE UP
GLUCOSE SERPL-MCNC: 163 MG/DL — HIGH (ref 70–99)
HCO3 BLDA-SCNC: 28 MMOL/L — SIGNIFICANT CHANGE UP (ref 21–29)
HCT VFR BLD CALC: 43.9 % — SIGNIFICANT CHANGE UP (ref 39–50)
HGB BLD-MCNC: 14.5 G/DL — SIGNIFICANT CHANGE UP (ref 13–17)
HOROWITZ INDEX BLDA+IHG-RTO: 21 — SIGNIFICANT CHANGE UP
MCHC RBC-ENTMCNC: 32.4 PG — SIGNIFICANT CHANGE UP (ref 27–34)
MCHC RBC-ENTMCNC: 33 GM/DL — SIGNIFICANT CHANGE UP (ref 32–36)
MCV RBC AUTO: 98.1 FL — SIGNIFICANT CHANGE UP (ref 80–100)
PCO2 BLDA: 43 MMHG — SIGNIFICANT CHANGE UP (ref 32–46)
PH BLDA: 7.43 — SIGNIFICANT CHANGE UP (ref 7.35–7.45)
PLATELET # BLD AUTO: 150 K/UL — SIGNIFICANT CHANGE UP (ref 150–400)
PO2 BLDA: 59 MMHG — LOW (ref 74–108)
POTASSIUM SERPL-MCNC: 4.2 MMOL/L — SIGNIFICANT CHANGE UP (ref 3.5–5.3)
POTASSIUM SERPL-SCNC: 4.2 MMOL/L — SIGNIFICANT CHANGE UP (ref 3.5–5.3)
RBC # BLD: 4.47 M/UL — SIGNIFICANT CHANGE UP (ref 4.2–5.8)
RBC # FLD: 14.1 % — SIGNIFICANT CHANGE UP (ref 10.3–14.5)
SAO2 % BLDA: 87 % — LOW (ref 92–96)
SODIUM SERPL-SCNC: 135 MMOL/L — SIGNIFICANT CHANGE UP (ref 135–145)
WBC # BLD: 5.3 K/UL — SIGNIFICANT CHANGE UP (ref 3.8–10.5)
WBC # FLD AUTO: 5.3 K/UL — SIGNIFICANT CHANGE UP (ref 3.8–10.5)

## 2017-08-30 PROCEDURE — 75561 CARDIAC MRI FOR MORPH W/DYE: CPT | Mod: 26

## 2017-08-30 PROCEDURE — 93010 ELECTROCARDIOGRAM REPORT: CPT

## 2017-08-30 RX ORDER — SENNA PLUS 8.6 MG/1
2 TABLET ORAL AT BEDTIME
Qty: 0 | Refills: 0 | Status: DISCONTINUED | OUTPATIENT
Start: 2017-08-30 | End: 2017-09-01

## 2017-08-30 RX ADMIN — SENNA PLUS 2 TABLET(S): 8.6 TABLET ORAL at 21:00

## 2017-08-30 RX ADMIN — ATORVASTATIN CALCIUM 20 MILLIGRAM(S): 80 TABLET, FILM COATED ORAL at 21:00

## 2017-08-30 RX ADMIN — Medication 1 TABLET(S): at 18:41

## 2017-08-30 RX ADMIN — TIOTROPIUM BROMIDE 1 CAPSULE(S): 18 CAPSULE ORAL; RESPIRATORY (INHALATION) at 11:59

## 2017-08-30 RX ADMIN — Medication 1 GRAM(S): at 18:41

## 2017-08-30 RX ADMIN — HEPARIN SODIUM 800 UNIT(S)/HR: 5000 INJECTION INTRAVENOUS; SUBCUTANEOUS at 02:34

## 2017-08-30 RX ADMIN — HEPARIN SODIUM 900 UNIT(S)/HR: 5000 INJECTION INTRAVENOUS; SUBCUTANEOUS at 11:51

## 2017-08-30 RX ADMIN — Medication 1 GRAM(S): at 11:59

## 2017-08-30 RX ADMIN — Medication 100 MILLIGRAM(S): at 05:29

## 2017-08-30 RX ADMIN — Medication 100 MILLIGRAM(S): at 18:41

## 2017-08-30 RX ADMIN — Medication 30 MILLILITER(S): at 23:42

## 2017-08-30 RX ADMIN — PANTOPRAZOLE SODIUM 40 MILLIGRAM(S): 20 TABLET, DELAYED RELEASE ORAL at 18:41

## 2017-08-30 RX ADMIN — HEPARIN SODIUM 2500 UNIT(S): 5000 INJECTION INTRAVENOUS; SUBCUTANEOUS at 11:57

## 2017-08-30 RX ADMIN — Medication 25 MILLIGRAM(S): at 19:36

## 2017-08-30 RX ADMIN — Medication 1 GRAM(S): at 05:30

## 2017-08-30 RX ADMIN — BUDESONIDE AND FORMOTEROL FUMARATE DIHYDRATE 2 PUFF(S): 160; 4.5 AEROSOL RESPIRATORY (INHALATION) at 18:54

## 2017-08-30 RX ADMIN — Medication 1 GRAM(S): at 23:42

## 2017-08-30 RX ADMIN — BUDESONIDE AND FORMOTEROL FUMARATE DIHYDRATE 2 PUFF(S): 160; 4.5 AEROSOL RESPIRATORY (INHALATION) at 05:30

## 2017-08-30 RX ADMIN — Medication 1 GRAM(S): at 00:07

## 2017-08-30 RX ADMIN — PANTOPRAZOLE SODIUM 40 MILLIGRAM(S): 20 TABLET, DELAYED RELEASE ORAL at 05:30

## 2017-08-30 RX ADMIN — HEPARIN SODIUM 900 UNIT(S)/HR: 5000 INJECTION INTRAVENOUS; SUBCUTANEOUS at 18:55

## 2017-08-30 RX ADMIN — Medication 81 MILLIGRAM(S): at 11:59

## 2017-08-30 RX ADMIN — Medication 40 MILLIGRAM(S): at 19:36

## 2017-08-30 RX ADMIN — Medication 40 MILLIGRAM(S): at 05:30

## 2017-08-30 NOTE — CHART NOTE - NSCHARTNOTEFT_GEN_A_CORE
EVENT NOTE  Event: Called to patient's bedside for patient evaluation of chest pain. This is a 62M with no PMHx here with chest pain. Began last week, described as pleuritic, worsens with cough and deep inspiration. Substernal chest pain at rest described as burning pain. Patient denies radiation and chest pain is reproduceable to tactile stimuli. Patient reports chest discomfort lasting only a few minutes resolving on its own. Patient denies palpitations, SOB, and N/V.      Vitals:T(F): 98.4 (08-30-17 @ 23:31)  HR: 90 (08-30-17 @ 23:31)  BP: 91/64 (08-30-17 @ 23:31)  RR: 18 (08-30-17 @ 23:31)  SpO2: 99% (08-30-17 @ 23:31)  Wt(kg): --    WBC Count: 5.3 K/uL (08-30 @ 10:45)  Hemoglobin: 14.5 g/dL (08-30 @ 10:45)  Hematocrit: 43.9 % (08-30 @ 10:45)  Platelet Count - Automated: 150 K/uL (08-30 @ 10:45)  Potassium, Serum: 4.2 mmol/L (08-30 @ 10:45)  Chloride, Serum: 98 mmol/L (08-30 @ 10:45)  Carbon Dioxide, Serum: 22 mmol/L (08-30 @ 10:45)  Glucose, Serum: 163 mg/dL (08-30 @ 10:45)  Blood Urea Nitrogen, Serum: 39 mg/dL (08-30 @ 10:45)    Creatinine, Serum: 1.21 08-30 @ 10:45          PHYSICAL EXAM:      General: A&OX3 no acute distress    Cardiovascular: S1S2 RRR    Respiratory: clear lungs     Gastrointestinal: ND. NT positive bowel sound     Extremities: Positive pulses No edema        Assessment/Plan: 62M with no PMHx here with chest pain. Began last week, described as pleuritic, worsens with cough and deep inspiration. Has been having SOB as well, dry cough. With complaints of chest pain.      Interventions performed:  Stat vital sign  EKG  Atypical chest pain lasting a few minutes with spontaneous resolution reproduceable to tactile stimuli  MAALOX ordered        Raymon Dela Cruz NP Medicine Team # [id number]

## 2017-08-30 NOTE — PROGRESS NOTE ADULT - SUBJECTIVE AND OBJECTIVE BOX
pt seen and examined, no complaints on exam.     ondansetron Injectable 4 milliGRAM(s) IV Push every 6 hours PRN  pantoprazole    Tablet 40 milliGRAM(s) Oral two times a day before meals  sucralfate suspension 1 Gram(s) Oral four times a day  furosemide   Injectable 40 milliGRAM(s) IV Push two times a day  heparin  Infusion.  Unit(s)/Hr IV Continuous <Continuous>  heparin  Injectable 5500 Unit(s) IV Push every 6 hours PRN  heparin  Injectable 2500 Unit(s) IV Push every 6 hours PRN  metoprolol succinate ER 25 milliGRAM(s) Oral daily  aspirin  chewable 81 milliGRAM(s) Oral daily  atorvastatin 20 milliGRAM(s) Oral at bedtime  buDESOnide 160 MICROgram(s)/formoterol 4.5 MICROgram(s) Inhaler 2 Puff(s) Inhalation two times a day  tiotropium 18 MICROgram(s) Capsule 1 Capsule(s) Inhalation daily  docusate sodium 100 milliGRAM(s) Oral two times a day                            16.2   7.4   )-----------( 151      ( 29 Aug 2017 09:56 )             51.2       Hemoglobin: 16.2 g/dL (08-29 @ 09:56)  Hemoglobin: 14.7 g/dL (08-29 @ 00:38)  Hemoglobin: 14.6 g/dL (08-27 @ 09:18)  Hemoglobin: 15.3 g/dL (08-25 @ 14:40)      08-29    137  |  98  |  39<H>  ----------------------------<  184<H>  4.2   |  26  |  1.26    Ca    9.2      29 Aug 2017 20:55  Phos  3.2     08-29  Mg     2.0     08-29      Creatinine Trend: 1.26<--, 1.14<--    COAGS: PTT - ( 30 Aug 2017 02:10 )  PTT:71.8 sec    CARDIAC MARKERS ( 28 Aug 2017 23:57 )  x     / <0.01 ng/mL / 127 U/L / x     / 3.7 ng/mL        T(C): 36.6 (08-30-17 @ 04:40), Max: 36.6 (08-29-17 @ 12:30)  HR: 94 (08-30-17 @ 04:40) (92 - 109)  BP: 101/79 (08-30-17 @ 04:40) (101/76 - 109/79)  RR: 18 (08-30-17 @ 04:40) (17 - 18)  SpO2: 95% (08-30-17 @ 04:40) (94% - 99%)  Wt(kg): --    I&O's Summary    28 Aug 2017 07:01  -  29 Aug 2017 07:00  --------------------------------------------------------  IN: 579 mL / OUT: 2100 mL / NET: -1521 mL    29 Aug 2017 07:01  -  30 Aug 2017 06:43  --------------------------------------------------------  IN: 836 mL / OUT: 1200 mL / NET: -364 mL    ECHO: < from: TTE with Doppler (w/Cont) (08.28.17 @ 22:35) >  ---------------------------------  Conclusions:  1. Thickened mitral valve. Dilated mitral valve annulus.  Tethered posterior leaflet.  Severe, eccentric,  posteriorly-directed mitral regurgitation. Flow reversal is  seen in the pulmonary veins.  2. Thickened aortic valve.  3. Severely dilated left atrium.  4. Moderate to severe left ventricular enlargement.  5. Endocardial visualization enhanced with intravenous  injection of echo contrast (Definity). Severe global left  ventricular systolic dysfunction with regional variation  (the mid to distal anterior wall and septum are akinetic).  Spontaneous echocardiographic contrast is seen in the left  ventricle. Ill-defined, mass at the apex of the left  ventricle. May be left ventricular trabeculation or  thrombus. Consider MRI to further evaluate.  6. E/e' equals 20, consistent with elevated left  ventricular end diastolic pressure.  7. Moderate right atrial enlargement.  8. Right ventricular enlargement with decreased right  ventricular systolic function.  Diastolic septal flattening  consistent with right ventricular volume overload.  9. Thickened tricuspid valve. Moderate-severe tricuspid  regurgitation.  10. Trace pericardial effusion.    < end of copied text >        HEENT:   Normal oral mucosa, PERRL, EOMI	  Lymphatic: No obvious lymphadenopathy , no edema  Cardiovascular: Normal S1 S2, No JVD, 1/6 CELSO murmur, Peripheral pulses palpable 2+ bilaterally  Respiratory: Lungs clear to auscultation, normal effort 	  Gastrointestinal:  Soft, Non-tender, + BS	  Skin: No rashes,  No cyanosis, warm to touch  Musculoskeletal: Normal range of motion, normal strength  Psychiatry:  Appropriate Mood & affect     TELEMETRY: 	  Sinus      ASSESSMENT/PLAN: 	62y Male admitted with SOB, pulmonary edema and elevated BNP  new sever LV dysfx, ? LV thrombus, severe MR    - cont heparin gtt  - F/U cardiac MRI  - Eventual r+L cath  - Toprol XL 25 mg PO daily  - ACE once BP allows.  - ASA, statin   - cont tele .  - keep net neg with lasix IV   D/W Dr Irene

## 2017-08-30 NOTE — PROGRESS NOTE ADULT - PROBLEM SELECTOR PLAN 1
continue current bronchodilators.  The ABG is with room air hypoxia:  check his o2 saos2 on ambulation, and if it drops below 88%, he would need home oxygen!! Also put him on continuous oxygen.

## 2017-08-30 NOTE — PROGRESS NOTE ADULT - PROBLEM SELECTOR PLAN 2
on IV lasix: His pleural effusions are likely due to CHF   Continue diuresis   ? LV thrombus: on AC and awaiting MRI too!!

## 2017-08-30 NOTE — PROGRESS NOTE ADULT - ATTENDING COMMENTS
Patient seen and examined, agree with above assessment and plan as transcribed above.    - For cardiac MRI today  - eventual left heart cath    Jj Irene MD, [facility]  [facility], [facility]  [address] [place] [address]  PHONE:  [phone]  BEEPER : [phone]

## 2017-08-30 NOTE — PROGRESS NOTE ADULT - ASSESSMENT
elderly patient with chronic smoking  history admitted with cough x 2 wks with SOB and found to have CHF with new severe LV dysfunction with CT scan suggestive of emphysema and moderate bilateral pleural effusion. He also is suspected to have LV thrombus? also had bilious vomiting and nausea with some abdominal pain: he is found to have desaturation in the night while sleeping.

## 2017-08-30 NOTE — DIETITIAN INITIAL EVALUATION ADULT. - OTHER INFO
consult for poor diet-ensure? pt speaks Creole but he was able to manage interview in English. pt states that he is consuming 50% intake of meals and he agrees to increase ensure enlive to 2 x daily. pt with dentures which does not interfere with chewing. pending MRI to reevaluate thrombus. pt states last BM was on the 25th as per Nursing during rounds. NKFA

## 2017-08-30 NOTE — PROGRESS NOTE ADULT - SUBJECTIVE AND OBJECTIVE BOX
Patient is a 62y old  Male who presents with a chief complaint of SOOB (25 Aug 2017 22:27)    did not want to use BiPAP: Does not like it   His breathing is same  he can speak in full sentences and denies any overnight respiratory distress      Any change in ROS:     MEDICATIONS  (STANDING):  pantoprazole    Tablet 40 milliGRAM(s) Oral two times a day before meals  sucralfate suspension 1 Gram(s) Oral four times a day  furosemide   Injectable 40 milliGRAM(s) IV Push two times a day  heparin  Infusion.  Unit(s)/Hr (12 mL/Hr) IV Continuous <Continuous>  metoprolol succinate ER 25 milliGRAM(s) Oral daily  aspirin  chewable 81 milliGRAM(s) Oral daily  atorvastatin 20 milliGRAM(s) Oral at bedtime  buDESOnide 160 MICROgram(s)/formoterol 4.5 MICROgram(s) Inhaler 2 Puff(s) Inhalation two times a day  tiotropium 18 MICROgram(s) Capsule 1 Capsule(s) Inhalation daily  docusate sodium 100 milliGRAM(s) Oral two times a day    MEDICATIONS  (PRN):  ondansetron Injectable 4 milliGRAM(s) IV Push every 6 hours PRN Nausea and/or Vomiting  heparin  Injectable 5500 Unit(s) IV Push every 6 hours PRN For aPTT less than 40  heparin  Injectable 2500 Unit(s) IV Push every 6 hours PRN For aPTT between 40 - 57    Vital Signs Last 24 Hrs  T(C): 36.6 (30 Aug 2017 04:40), Max: 36.6 (29 Aug 2017 12:30)  T(F): 97.9 (30 Aug 2017 04:40), Max: 97.9 (30 Aug 2017 04:40)  HR: 102 (30 Aug 2017 07:05) (92 - 109)  BP: 101/79 (30 Aug 2017 04:40) (101/76 - 109/79)  BP(mean): --  RR: 18 (30 Aug 2017 04:40) (17 - 18)  SpO2: 97% (30 Aug 2017 07:05) (94% - 99%)    I&O's Summary    29 Aug 2017 07:01  -  30 Aug 2017 07:00  --------------------------------------------------------  IN: 900 mL / OUT: 1600 mL / NET: -700 mL          Physical Exam:   GENERAL: NAD, well-groomed, well-developed  HEENT: ALONSO/   Atraumatic, Normocephalic  ENMT: No tonsillar erythema, exudates, or enlargement; Moist mucous membranes, Good dentition, No lesions  NECK: Supple, No JVD, Normal thyroid  CHEST/LUNG: Decreased air entry bilaterally!!  CVS: Regular rate and rhythm; No murmurs, rubs, or gallops  GI: : Soft, Nontender, Nondistended; Bowel sounds present  NERVOUS SYSTEM:  Alert & Oriented X3  EXTREMITIES:  2+ Peripheral Pulses, No clubbing, cyanosis, or edema  LYMPH: No lymphadenopathy noted  SKIN: No rashes or lesions  ENDOCRINOLOGY: No Thyromegaly  PSYCH: Appropriate    Labs:  ABG - ( 30 Aug 2017 10:28 )  pH: 7.43  /  pCO2: 43    /  pO2: 59    / HCO3: 28    / Base Excess: 3.7   /  SaO2: 87        29  CARDIAC MARKERS ( 28 Aug 2017 23:57 )  x     / <0.01 ng/mL / 127 U/L / x     / 3.7 ng/mL                            16.2   7.4   )-----------( 151      ( 29 Aug 2017 09:56 )             51.2                         14.7   7.0   )-----------( 150      ( 29 Aug 2017 00:38 )             45.7                         14.6   4.8   )-----------( 149      ( 27 Aug 2017 09:18 )             44.7     08-29    137  |  98  |  39<H>  ----------------------------<  184<H>  4.2   |  26  |  1.26    Ca    9.2      29 Aug 2017 20:55  Phos  3.2     08-29  Mg     2.0     08-29      CAPILLARY BLOOD GLUCOSE            PTT - ( 30 Aug 2017 02:10 )  PTT:71.8 sec    D-Dimer Assay, Quantitative: 4027 ng/mL DDU (08-25 @ 14:41)  Cultures:       Rapid Respiratory Viral Panel Result        08-25 @ 15:06  Rapid RVP NotDetec  Coronovirus --  Adenovirus --  Bordetella Pertussis --  Chlamydia Pneumonia --  Entero/Rhinovirus--  HKU1 Coronovirus --  HMPV Coronovirus --  Influenza A --  Influenza AH1 --  Influenza AH1 2009 --  Influenza AH3 --  Influenza B --  Mycoplasma Pneumoniae --  NL63 Coronovirus --  OC43 Coronovirus --  Parainfluenza 1 --  Parainfluenza 2 --  Parainfluenza 3 --  Parainfluenza 4 --  Resp Syncytial Virus --          Studies  Chest X-RAY  CT SCAN Chest < from: CT Angio Chest w/ IV Cont (08.25.17 @ 21:07) >  ericardial effusion.  MEDIASTINUM AND DON: Subcarinal nodes may be reactive..  CHEST WALL AND LOWER NECK: Within normal limits.  VISUALIZED UPPER ABDOMEN: Within normal limits.  BONES: Within normal limits.    IMPRESSION:     No pulmonary embolism.    Mild pulmonary edema with small moderate bilateral pleural effusions.      TARYN FIERRO M.D., RADIOLOGY RESIDENT  This document has been electronically signed.  ESME BENDER M.D., ATTENDING RADIOLOGIST  This document has been electronically signed. Aug 26 2017  8:57AM    < end of copied text >    Venous Dopplers: LE:   CT Abdomen  Others    < from: TTE with Doppler (w/Cont) (08.28.17 @ 22:35) >  Conclusions:  1. Thickened mitral valve. Dilated mitral valve annulus.  Tethered posterior leaflet.  Severe, eccentric,  posteriorly-directed mitral regurgitation. Flow reversal is  seen in the pulmonary veins.  2. Thickened aortic valve.  3. Severely dilated left atrium.  4. Moderate to severe left ventricular enlargement.  5. Endocardial visualization enhanced with intravenous  injection of echo contrast (Definity). Severe global left  ventricular systolic dysfunction with regional variation  (the mid to distal anterior wall and septum are akinetic).  Spontaneous echocardiographic contrast is seen in the left  ventricle. Ill-defined, mass at the apex of the left  ventricle. May be left ventricular trabeculation or  thrombus. Consider MRI to further evaluate.  6. E/e' equals 20, consistent with elevated left  ventricular end diastolic pressure.  7. Moderate right atrial enlargement.  8. Right ventricular enlargement with decreased right  ventricular systolic function.  Diastolic septal flattening  consistent with right ventricular volume overload.  9. Thickened tricuspid valve. Moderate-severe tricuspid  regurgitation.  10. Trace pericardial effusion.  ------------------------------------------------------------------------  Confirmed on  8/28/2017 - 15:54:57 by Matias Stubbs M.D.  ------------------------------------------------------------------------    < end of copied text >

## 2017-08-30 NOTE — PROGRESS NOTE ADULT - SUBJECTIVE AND OBJECTIVE BOX
INTERVAL HPI/OVERNIGHT EVENTS:        tolerating diet    MEDICATIONS  (STANDING):  heparin  Injectable 5000 Unit(s) SubCutaneous every 8 hours  pantoprazole    Tablet 40 milliGRAM(s) Oral two times a day before meals  sucralfate suspension 1 Gram(s) Oral four times a day  furosemide   Injectable 40 milliGRAM(s) IV Push two times a day    MEDICATIONS  (PRN):  ondansetron Injectable 4 milliGRAM(s) IV Push every 6 hours PRN Nausea and/or Vomiting      Allergies    No Known Allergies    Intolerances        ROS:   General:  No wt loss, fevers, chills, night sweats, fatigue,   Eyes:  Good vision, no reported pain  ENT:  No sore throat, pain, runny nose, dysphagia  CV:  No pain, palpitations, hypo/hypertension  Resp:  No dyspnea, cough, tachypnea, wheezing  GI:  No pain, No nausea, No vomiting, No diarrhea, No constipation, No weight loss, No fever, No pruritis, No rectal bleeding, No tarry stools, No dysphagia,  :  No pain, bleeding, incontinence, nocturia  Muscle:  No pain, weakness  Neuro:  No weakness, tingling, memory problems  Psych:  No fatigue, insomnia, mood problems, depression  Endocrine:  No polyuria, polydipsia, cold/heat intolerance  Heme:  No petechiae, ecchymosis, easy bruisability  Skin:  No rash, tattoos, scars, edema      PHYSICAL EXAM:   Vital Signs:  Vital Signs Last 24 Hrs  T(C): 36.6 (28 Aug 2017 12:05), Max: 36.6 (27 Aug 2017 21:17)  T(F): 97.8 (28 Aug 2017 12:05), Max: 97.8 (27 Aug 2017 21:17)  HR: 104 (28 Aug 2017 12:05) (97 - 104)  BP: 109/82 (28 Aug 2017 12:05) (97/67 - 109/82)  BP(mean): --  RR: 18 (28 Aug 2017 12:05) (18 - 18)  SpO2: 93% (28 Aug 2017 12:05) (93% - 97%)  Daily     Daily Weight in k.5 (28 Aug 2017 04:17)    GENERAL:  Appears stated age, well-groomed, well-nourished, no distress  HEENT:  NC/AT,  conjunctivae clear and pink, no thyromegaly, nodules, adenopathy, no JVD, sclera -anicteric  CHEST:  Full & symmetric excursion, no increased effort, breath sounds clear  HEART:  Regular rhythm, S1, S2, no murmur/rub/S3/S4, no abdominal bruit, no edema  ABDOMEN:  Soft, non-tender, non-distended, normoactive bowel sounds,  no masses ,no hepato-splenomegaly, no signs of chronic liver disease  EXTEREMITIES:  no cyanosis,clubbing or edema  SKIN:  No rash/erythema/ecchymoses/petechiae/wounds/abscess/warm/dry  NEURO:  Alert, oriented, no asterixis, no tremor, no encephalopathy      LABS:                        14.6   4.8   )-----------( 149      ( 27 Aug 2017 09:18 )             44.7             Urinalysis Basic - ( 27 Aug 2017 18:36 )    Color: Yellow / Appearance: Clear / SG: >1.030 / pH: x  Gluc: x / Ketone: Trace  / Bili: Negative / Urobili: Negative   Blood: x / Protein: 30 mg/dL / Nitrite: Negative   Leuk Esterase: Negative / RBC: 2-5 /HPF / WBC 0-2 /HPF   Sq Epi: x / Non Sq Epi: x / Bacteria: x        RADIOLOGY & ADDITIONAL TESTS:

## 2017-08-30 NOTE — PROGRESS NOTE ADULT - PROBLEM SELECTOR PLAN 2
MRI does not reveal a thrombus. Will stop the anticoagulation and obtain cardiac FU for further work up

## 2017-08-30 NOTE — PROGRESS NOTE ADULT - SUBJECTIVE AND OBJECTIVE BOX
Patient is a 62y old  Male who presents with a chief complaint of SOOB (25 Aug 2017 22:27)      HPI:  No SOB at rest,no new chest pain)      PAST MEDICAL & SURGICAL HISTORY:  No pertinent past medical history  No significant past surgical history      Review of Systems:   CONSTITUTIONAL: No fever, weight loss, or fatigue  EYES: No eye pain, visual disturbances, or discharge  ENMT:  No difficulty hearing, tinnitus, vertigo; No sinus or throat pain  NECK: No pain or stiffness  BREASTS: No pain, masses, or nipple discharge  RESPIRATORY: No cough, wheezing, chills or hemoptysis; No shortness of breath  CARDIOVASCULAR: No chest pain, palpitations, dizziness, or leg swelling  GASTROINTESTINAL: No abdominal or epigastric pain. No nausea, vomiting, or hematemesis; No diarrhea or constipation. No melena or hematochezia.  GENITOURINARY: No dysuria, frequency, hematuria, or incontinence  NEUROLOGICAL: No headaches, memory loss, loss of strength, numbness, or tremors  SKIN: No itching, burning, rashes, or lesions   LYMPH NODES: No enlarged glands  ENDOCRINE: No heat or cold intolerance; No hair loss  MUSCULOSKELETAL: No joint pain or swelling; No muscle, back, or extremity pain  PSYCHIATRIC: No depression, anxiety, mood swings, or difficulty sleeping  HEME/LYMPH: No easy bruising, or bleeding gums  ALLERY AND IMMUNOLOGIC: No hives or eczema    Allergies    No Known Allergies    Intolerances        Social History:     FAMILY HISTORY:  No pertinent family history in first degree relatives      MEDICATIONS  (STANDING):  pantoprazole    Tablet 40 milliGRAM(s) Oral two times a day before meals  sucralfate suspension 1 Gram(s) Oral four times a day  furosemide   Injectable 40 milliGRAM(s) IV Push two times a day  heparin  Infusion.  Unit(s)/Hr (12 mL/Hr) IV Continuous <Continuous>  metoprolol succinate ER 25 milliGRAM(s) Oral daily  aspirin  chewable 81 milliGRAM(s) Oral daily  atorvastatin 20 milliGRAM(s) Oral at bedtime  buDESOnide 160 MICROgram(s)/formoterol 4.5 MICROgram(s) Inhaler 2 Puff(s) Inhalation two times a day  tiotropium 18 MICROgram(s) Capsule 1 Capsule(s) Inhalation daily  docusate sodium 100 milliGRAM(s) Oral two times a day  senna 2 Tablet(s) Oral at bedtime  multivitamin 1 Tablet(s) Oral daily    MEDICATIONS  (PRN):  ondansetron Injectable 4 milliGRAM(s) IV Push every 6 hours PRN Nausea and/or Vomiting  heparin  Injectable 5500 Unit(s) IV Push every 6 hours PRN For aPTT less than 40  heparin  Injectable 2500 Unit(s) IV Push every 6 hours PRN For aPTT between 40 - 57        CAPILLARY BLOOD GLUCOSE        I&O's Summary    29 Aug 2017 07:01  -  30 Aug 2017 07:00  --------------------------------------------------------  IN: 900 mL / OUT: 1600 mL / NET: -700 mL    30 Aug 2017 07:01  -  30 Aug 2017 18:01  --------------------------------------------------------  IN: 480 mL / OUT: 250 mL / NET: 230 mL        PHYSICAL EXAM:  Vital Signs Last 24 Hrs  T(C): 36.6 (30 Aug 2017 12:25), Max: 36.6 (30 Aug 2017 04:40)  T(F): 97.8 (30 Aug 2017 12:25), Max: 97.9 (30 Aug 2017 04:40)  HR: 85 (30 Aug 2017 12:25) (85 - 106)  BP: 100/58 (30 Aug 2017 12:25) (100/58 - 109/79)  BP(mean): --  RR: 17 (30 Aug 2017 12:25) (17 - 18)  SpO2: 98% (30 Aug 2017 12:25) (94% - 100%)    GENERAL: NAD, well-developed  HEAD:  Atraumatic, Normocephalic  EYES: EOMI, PERRLA, conjunctiva and sclera clear  NECK: Supple, No JVD  CHEST/LUNG: Clear to auscultation bilaterally; No wheeze  HEART: Regular rate and rhythm; No murmurs, rubs, or gallops  ABDOMEN: Soft, Nontender, Nondistended; Bowel sounds present  EXTREMITIES:  2+ Peripheral Pulses, No clubbing, cyanosis, or edema  PSYCH: AAOx3  NEUROLOGY: non-focal  SKIN: No rashes or lesions    LABS:                        14.5   5.3   )-----------( 150      ( 30 Aug 2017 10:45 )             43.9     08-30    135  |  98  |  39<H>  ----------------------------<  163<H>  4.2   |  22  |  1.21    Ca    9.3      30 Aug 2017 10:45  Phos  3.2     08-29  Mg     2.0     08-29      PTT - ( 30 Aug 2017 10:45 )  PTT:50.1 sec  CARDIAC MARKERS ( 28 Aug 2017 23:57 )  x     / <0.01 ng/mL / 127 U/L / x     / 3.7 ng/mL          RADIOLOGY & ADDITIONAL TESTS:< from: MRI Cardiac w/wo Cont (08.30.17 @ 14:56) >  1.  Technically difficult exam secondary to respiratory motion artifact.  2.  Decreased left ventricular ejection fraction with a calculated   ejection fraction 16.5%.  3.  No convincing findings to suggest ventricular thrombus.  4.  Likely mid myocardial focus of late gadolinium enhancement it is   nonspecific butcan occur in the clinical setting of left ventricular   heart failure.  5.  Mitral valve and tricuspid valve insufficiency.      < end of copied text >      Imaging Personally Reviewed:    Consultant(s) Notes Reviewed:      Care Discussed with Consultants/Other Providers:

## 2017-08-31 DIAGNOSIS — Z29.9 ENCOUNTER FOR PROPHYLACTIC MEASURES, UNSPECIFIED: ICD-10-CM

## 2017-08-31 LAB
ALBUMIN SERPL ELPH-MCNC: 3.3 G/DL — SIGNIFICANT CHANGE UP (ref 3.3–5)
ALP SERPL-CCNC: 121 U/L — HIGH (ref 40–120)
ALT FLD-CCNC: 147 U/L RC — HIGH (ref 10–45)
ANION GAP SERPL CALC-SCNC: 15 MMOL/L — SIGNIFICANT CHANGE UP (ref 5–17)
ANION GAP SERPL CALC-SCNC: 16 MMOL/L — SIGNIFICANT CHANGE UP (ref 5–17)
APTT BLD: 94.1 SEC — HIGH (ref 27.5–37.4)
APTT BLD: > 200 SEC (ref 27.5–37.4)
AST SERPL-CCNC: 72 U/L — HIGH (ref 10–40)
BASOPHILS # BLD AUTO: 0.1 K/UL — SIGNIFICANT CHANGE UP (ref 0–0.2)
BASOPHILS NFR BLD AUTO: 1.2 % — SIGNIFICANT CHANGE UP (ref 0–2)
BILIRUB SERPL-MCNC: 0.4 MG/DL — SIGNIFICANT CHANGE UP (ref 0.2–1.2)
BUN SERPL-MCNC: 33 MG/DL — HIGH (ref 7–23)
BUN SERPL-MCNC: 33 MG/DL — HIGH (ref 7–23)
CALCIUM SERPL-MCNC: 8.4 MG/DL — SIGNIFICANT CHANGE UP (ref 8.4–10.5)
CALCIUM SERPL-MCNC: 9.2 MG/DL — SIGNIFICANT CHANGE UP (ref 8.4–10.5)
CHLORIDE SERPL-SCNC: 100 MMOL/L — SIGNIFICANT CHANGE UP (ref 96–108)
CHLORIDE SERPL-SCNC: 97 MMOL/L — SIGNIFICANT CHANGE UP (ref 96–108)
CK MB BLD-MCNC: 1.5 % — SIGNIFICANT CHANGE UP (ref 0–3.5)
CK MB BLD-MCNC: 2.6 % — SIGNIFICANT CHANGE UP (ref 0–3.5)
CK MB CFR SERPL CALC: 2.4 NG/ML — SIGNIFICANT CHANGE UP (ref 0–6.7)
CK MB CFR SERPL CALC: 4.2 NG/ML — SIGNIFICANT CHANGE UP (ref 0–6.7)
CK SERPL-CCNC: 160 U/L — SIGNIFICANT CHANGE UP (ref 30–200)
CK SERPL-CCNC: 161 U/L — SIGNIFICANT CHANGE UP (ref 30–200)
CO2 SERPL-SCNC: 23 MMOL/L — SIGNIFICANT CHANGE UP (ref 22–31)
CO2 SERPL-SCNC: 24 MMOL/L — SIGNIFICANT CHANGE UP (ref 22–31)
CREAT SERPL-MCNC: 1.06 MG/DL — SIGNIFICANT CHANGE UP (ref 0.5–1.3)
CREAT SERPL-MCNC: 1.08 MG/DL — SIGNIFICANT CHANGE UP (ref 0.5–1.3)
EOSINOPHIL # BLD AUTO: 0.1 K/UL — SIGNIFICANT CHANGE UP (ref 0–0.5)
EOSINOPHIL NFR BLD AUTO: 1.6 % — SIGNIFICANT CHANGE UP (ref 0–6)
GLUCOSE SERPL-MCNC: 130 MG/DL — HIGH (ref 70–99)
GLUCOSE SERPL-MCNC: 139 MG/DL — HIGH (ref 70–99)
HCT VFR BLD CALC: 45.4 % — SIGNIFICANT CHANGE UP (ref 39–50)
HCT VFR BLD CALC: 45.5 % — SIGNIFICANT CHANGE UP (ref 39–50)
HGB BLD-MCNC: 14.9 G/DL — SIGNIFICANT CHANGE UP (ref 13–17)
HGB BLD-MCNC: 15 G/DL — SIGNIFICANT CHANGE UP (ref 13–17)
LYMPHOCYTES # BLD AUTO: 1.6 K/UL — SIGNIFICANT CHANGE UP (ref 1–3.3)
LYMPHOCYTES # BLD AUTO: 32.9 % — SIGNIFICANT CHANGE UP (ref 13–44)
MAGNESIUM SERPL-MCNC: 2.3 MG/DL — SIGNIFICANT CHANGE UP (ref 1.6–2.6)
MCHC RBC-ENTMCNC: 32.4 PG — SIGNIFICANT CHANGE UP (ref 27–34)
MCHC RBC-ENTMCNC: 32.7 GM/DL — SIGNIFICANT CHANGE UP (ref 32–36)
MCHC RBC-ENTMCNC: 32.7 PG — SIGNIFICANT CHANGE UP (ref 27–34)
MCHC RBC-ENTMCNC: 33.1 GM/DL — SIGNIFICANT CHANGE UP (ref 32–36)
MCV RBC AUTO: 98.9 FL — SIGNIFICANT CHANGE UP (ref 80–100)
MCV RBC AUTO: 99.2 FL — SIGNIFICANT CHANGE UP (ref 80–100)
MONOCYTES # BLD AUTO: 0.6 K/UL — SIGNIFICANT CHANGE UP (ref 0–0.9)
MONOCYTES NFR BLD AUTO: 12.8 % — SIGNIFICANT CHANGE UP (ref 2–14)
NEUTROPHILS # BLD AUTO: 2.5 K/UL — SIGNIFICANT CHANGE UP (ref 1.8–7.4)
NEUTROPHILS NFR BLD AUTO: 51.5 % — SIGNIFICANT CHANGE UP (ref 43–77)
PHOSPHATE SERPL-MCNC: 3.2 MG/DL — SIGNIFICANT CHANGE UP (ref 2.5–4.5)
PLATELET # BLD AUTO: 134 K/UL — LOW (ref 150–400)
PLATELET # BLD AUTO: 154 K/UL — SIGNIFICANT CHANGE UP (ref 150–400)
POTASSIUM SERPL-MCNC: 3.9 MMOL/L — SIGNIFICANT CHANGE UP (ref 3.5–5.3)
POTASSIUM SERPL-MCNC: 4.3 MMOL/L — SIGNIFICANT CHANGE UP (ref 3.5–5.3)
POTASSIUM SERPL-SCNC: 3.9 MMOL/L — SIGNIFICANT CHANGE UP (ref 3.5–5.3)
POTASSIUM SERPL-SCNC: 4.3 MMOL/L — SIGNIFICANT CHANGE UP (ref 3.5–5.3)
PROT SERPL-MCNC: 6.1 G/DL — SIGNIFICANT CHANGE UP (ref 6–8.3)
RBC # BLD: 4.58 M/UL — SIGNIFICANT CHANGE UP (ref 4.2–5.8)
RBC # BLD: 4.59 M/UL — SIGNIFICANT CHANGE UP (ref 4.2–5.8)
RBC # FLD: 14.1 % — SIGNIFICANT CHANGE UP (ref 10.3–14.5)
RBC # FLD: 14.4 % — SIGNIFICANT CHANGE UP (ref 10.3–14.5)
SODIUM SERPL-SCNC: 136 MMOL/L — SIGNIFICANT CHANGE UP (ref 135–145)
SODIUM SERPL-SCNC: 139 MMOL/L — SIGNIFICANT CHANGE UP (ref 135–145)
TROPONIN T SERPL-MCNC: 0.02 NG/ML — SIGNIFICANT CHANGE UP (ref 0–0.06)
TROPONIN T SERPL-MCNC: <0.01 NG/ML — SIGNIFICANT CHANGE UP (ref 0–0.06)
WBC # BLD: 4.8 K/UL — SIGNIFICANT CHANGE UP (ref 3.8–10.5)
WBC # BLD: 5 K/UL — SIGNIFICANT CHANGE UP (ref 3.8–10.5)
WBC # FLD AUTO: 4.8 K/UL — SIGNIFICANT CHANGE UP (ref 3.8–10.5)
WBC # FLD AUTO: 5 K/UL — SIGNIFICANT CHANGE UP (ref 3.8–10.5)

## 2017-08-31 PROCEDURE — 74000: CPT | Mod: 26

## 2017-08-31 PROCEDURE — 93010 ELECTROCARDIOGRAM REPORT: CPT

## 2017-08-31 PROCEDURE — 93460 R&L HRT ART/VENTRICLE ANGIO: CPT | Mod: 26,GC

## 2017-08-31 RX ORDER — MULTIVIT WITH MIN/MFOLATE/K2 340-15/3 G
150 POWDER (GRAM) ORAL ONCE
Qty: 0 | Refills: 0 | Status: COMPLETED | OUTPATIENT
Start: 2017-08-31 | End: 2017-08-31

## 2017-08-31 RX ORDER — BUDESONIDE AND FORMOTEROL FUMARATE DIHYDRATE 160; 4.5 UG/1; UG/1
2 AEROSOL RESPIRATORY (INHALATION)
Qty: 0 | Refills: 0 | Status: DISCONTINUED | OUTPATIENT
Start: 2017-08-31 | End: 2017-09-12

## 2017-08-31 RX ORDER — POTASSIUM CHLORIDE 20 MEQ
10 PACKET (EA) ORAL ONCE
Qty: 0 | Refills: 0 | Status: COMPLETED | OUTPATIENT
Start: 2017-08-31 | End: 2017-08-31

## 2017-08-31 RX ORDER — HEPARIN SODIUM 5000 [USP'U]/ML
5000 INJECTION INTRAVENOUS; SUBCUTANEOUS EVERY 12 HOURS
Qty: 0 | Refills: 0 | Status: DISCONTINUED | OUTPATIENT
Start: 2017-08-31 | End: 2017-09-06

## 2017-08-31 RX ORDER — MORPHINE SULFATE 50 MG/1
1 CAPSULE, EXTENDED RELEASE ORAL ONCE
Qty: 0 | Refills: 0 | Status: DISCONTINUED | OUTPATIENT
Start: 2017-08-31 | End: 2017-08-31

## 2017-08-31 RX ADMIN — Medication 25 MILLIGRAM(S): at 18:51

## 2017-08-31 RX ADMIN — PANTOPRAZOLE SODIUM 40 MILLIGRAM(S): 20 TABLET, DELAYED RELEASE ORAL at 05:16

## 2017-08-31 RX ADMIN — Medication 10 MILLIEQUIVALENT(S): at 20:37

## 2017-08-31 RX ADMIN — Medication 81 MILLIGRAM(S): at 11:53

## 2017-08-31 RX ADMIN — ATORVASTATIN CALCIUM 20 MILLIGRAM(S): 80 TABLET, FILM COATED ORAL at 20:38

## 2017-08-31 RX ADMIN — BUDESONIDE AND FORMOTEROL FUMARATE DIHYDRATE 2 PUFF(S): 160; 4.5 AEROSOL RESPIRATORY (INHALATION) at 05:16

## 2017-08-31 RX ADMIN — Medication 1 GRAM(S): at 11:54

## 2017-08-31 RX ADMIN — PANTOPRAZOLE SODIUM 40 MILLIGRAM(S): 20 TABLET, DELAYED RELEASE ORAL at 18:51

## 2017-08-31 RX ADMIN — Medication 100 MILLIGRAM(S): at 05:16

## 2017-08-31 RX ADMIN — Medication 40 MILLIGRAM(S): at 05:17

## 2017-08-31 RX ADMIN — MORPHINE SULFATE 1 MILLIGRAM(S): 50 CAPSULE, EXTENDED RELEASE ORAL at 06:02

## 2017-08-31 RX ADMIN — Medication 150 MILLILITER(S): at 00:23

## 2017-08-31 RX ADMIN — HEPARIN SODIUM 900 UNIT(S)/HR: 5000 INJECTION INTRAVENOUS; SUBCUTANEOUS at 01:56

## 2017-08-31 RX ADMIN — Medication 1 TABLET(S): at 11:53

## 2017-08-31 RX ADMIN — TIOTROPIUM BROMIDE 1 CAPSULE(S): 18 CAPSULE ORAL; RESPIRATORY (INHALATION) at 11:54

## 2017-08-31 RX ADMIN — SENNA PLUS 2 TABLET(S): 8.6 TABLET ORAL at 20:37

## 2017-08-31 RX ADMIN — Medication 1 GRAM(S): at 20:37

## 2017-08-31 RX ADMIN — Medication 1 GRAM(S): at 05:16

## 2017-08-31 RX ADMIN — Medication 40 MILLIGRAM(S): at 18:51

## 2017-08-31 RX ADMIN — MORPHINE SULFATE 1 MILLIGRAM(S): 50 CAPSULE, EXTENDED RELEASE ORAL at 06:17

## 2017-08-31 NOTE — PROGRESS NOTE ADULT - PROBLEM SELECTOR PLAN 1
Cardiology follow-up note, medications as per them. Started on beta blockers and ACE Inh  Needs ischemia work up, ? cath

## 2017-08-31 NOTE — PROGRESS NOTE ADULT - ATTENDING COMMENTS
Patient seen and examined, agree with above assessment and plan as transcribed above.    - No need for heparin gtt, No LV thrombus on MRI  - For R+L cath today    Jj Irene MD, [facility], [facility]  [address] [place] [address]  PHONE:  [phone]  BEEPER : [phone]

## 2017-08-31 NOTE — CHART NOTE - NSCHARTNOTEFT_GEN_A_CORE
episodic note    patient complained of chest and epigastric pain post procedure (R+L heart cath)    O/E alert and orientated. VS unchanged  /77, HR 97, O2 sat 100 on supplemental O2  pulm- no audible rales anteriorly or lateral (pt supine post cath)   CV S1 S2 RRR  abd- tenderness over epigastric area  +BS no rebound tenderness  right groin site - no hematoma, no bleeding. DP by doppler     EKG showed no acute ischemic changes  (cath -shows luminal irregularities/EDP 32)     case discussed with Dr Hernandez- pain likely related to decreased CO/cardiogenic shock  Patient is awaiting transfer to CCU for further management   chest pain resolved after few minutes and epigastric pain decreased  on PPI and IV diuretic therapy. Stable for transfer to CCU  Further care as per CCU team. Report given to CCU NP

## 2017-08-31 NOTE — PROGRESS NOTE ADULT - PROBLEM SELECTOR PLAN 2
on IV lasix: His pleural effusions are likely due to CHF   Continue diuresis  no LV thrombus on MRI : has poor ejection fraction

## 2017-08-31 NOTE — PROGRESS NOTE ADULT - SUBJECTIVE AND OBJECTIVE BOX
pt seen and examined, no complaints on exam. events noted overnight .     ondansetron Injectable 4 milliGRAM(s) IV Push every 6 hours PRN  pantoprazole    Tablet 40 milliGRAM(s) Oral two times a day before meals  sucralfate suspension 1 Gram(s) Oral four times a day  furosemide   Injectable 40 milliGRAM(s) IV Push two times a day  heparin  Infusion.  Unit(s)/Hr IV Continuous <Continuous>  heparin  Injectable 5500 Unit(s) IV Push every 6 hours PRN  heparin  Injectable 2500 Unit(s) IV Push every 6 hours PRN  metoprolol succinate ER 25 milliGRAM(s) Oral daily  aspirin  chewable 81 milliGRAM(s) Oral daily  atorvastatin 20 milliGRAM(s) Oral at bedtime  buDESOnide 160 MICROgram(s)/formoterol 4.5 MICROgram(s) Inhaler 2 Puff(s) Inhalation two times a day  tiotropium 18 MICROgram(s) Capsule 1 Capsule(s) Inhalation daily  docusate sodium 100 milliGRAM(s) Oral two times a day  senna 2 Tablet(s) Oral at bedtime  multivitamin 1 Tablet(s) Oral daily                            14.5   5.3   )-----------( 150      ( 30 Aug 2017 10:45 )             43.9       Hemoglobin: 14.5 g/dL (08-30 @ 10:45)  Hemoglobin: 16.2 g/dL (08-29 @ 09:56)  Hemoglobin: 14.7 g/dL (08-29 @ 00:38)  Hemoglobin: 14.6 g/dL (08-27 @ 09:18)      08-31    136  |  97  |  33<H>  ----------------------------<  139<H>  4.3   |  23  |  1.08    Ca    9.2      31 Aug 2017 06:29  Phos  3.2     08-29  Mg     2.0     08-29      Creatinine Trend: 1.08<--, 1.21<--, 1.26<--, 1.14<--    COAGS: PTT - ( 31 Aug 2017 01:32 )  PTT:94.1 sec    CARDIAC MARKERS ( 31 Aug 2017 06:29 )  x     / <0.01 ng/mL / 161 U/L / x     / x      CARDIAC MARKERS ( 28 Aug 2017 23:57 )  x     / <0.01 ng/mL / 127 U/L / x     / 3.7 ng/mL        T(C): 36.4 (08-31-17 @ 04:00), Max: 36.9 (08-30-17 @ 23:31)  HR: 92 (08-31-17 @ 05:10) (85 - 95)  BP: 103/68 (08-31-17 @ 05:10) (91/64 - 103/73)  RR: 18 (08-31-17 @ 04:00) (17 - 18)  SpO2: 100% (08-31-17 @ 04:00) (98% - 100%)  Wt(kg): --    I&O's Summary    30 Aug 2017 07:01  -  31 Aug 2017 07:00  --------------------------------------------------------  IN: 1003 mL / OUT: 1600 mL / NET: -597 mL        ECHO: < from: TTE with Doppler (w/Cont) (08.28.17 @ 22:35) >  ---------------------------------  Conclusions:  1. Thickened mitral valve. Dilated mitral valve annulus.  Tethered posterior leaflet.  Severe, eccentric,  posteriorly-directed mitral regurgitation. Flow reversal is  seen in the pulmonary veins.  2. Thickened aortic valve.  3. Severely dilated left atrium.  4. Moderate to severe left ventricular enlargement.  5. Endocardial visualization enhanced with intravenous  injection of echo contrast (Definity). Severe global left  ventricular systolic dysfunction with regional variation  (the mid to distal anterior wall and septum are akinetic).  Spontaneous echocardiographic contrast is seen in the left  ventricle. Ill-defined, mass at the apex of the left  ventricle. May be left ventricular trabeculation or  thrombus. Consider MRI to further evaluate.  6. E/e' equals 20, consistent with elevated left  ventricular end diastolic pressure.  7. Moderate right atrial enlargement.  8. Right ventricular enlargement with decreased right  ventricular systolic function.  Diastolic septal flattening  consistent with right ventricular volume overload.  9. Thickened tricuspid valve. Moderate-severe tricuspid  regurgitation.  10. Trace pericardial effusion.    < end of copied text >      CARDIAC MRI: < from: MRI Cardiac w/wo Cont (08.30.17 @ 14:56) >  IMPRESSION:    1.  Technically difficult exam secondary to respiratory motion artifact.  2.  Decreased left ventricular ejection fraction with a calculated   ejection fraction 16.5%.  3.  No convincing findings to suggest ventricular thrombus.  4.  Likely mid myocardial focus of late gadolinium enhancement it is   nonspecific butcan occur in the clinical setting of left ventricular   heart failure.  5.  Mitral valve and tricuspid valve insufficiency.    < end of copied text >        HEENT:   Normal oral mucosa, PERRL, EOMI	  Lymphatic: No obvious lymphadenopathy , no edema  Cardiovascular: Normal S1 S2, No JVD, 1/6 CELSO murmur, Peripheral pulses palpable 2+ bilaterally  Respiratory: Lungs clear to auscultation, normal effort 	  Gastrointestinal:  Soft, Non-tender, + BS	  Skin: No rashes,  No cyanosis, warm to touch  Musculoskeletal: Normal range of motion, normal strength  Psychiatry:  Appropriate Mood & affect     TELEMETRY: 	  Sinus      ASSESSMENT/PLAN: 	62y Male admitted with SOB, pulmonary edema and elevated BNP  new sever LV dysfx, ? LV thrombus, severe MR    - cont A/C for PAF ,.   - cardiac MRI noted   - Eventual r+L cath  - Toprol XL 25 mg PO daily  - ACE once BP allows.  - ASA, statin   - cont tele .  - keep net neg with lasix IV   D/W Dr Irene

## 2017-08-31 NOTE — PROGRESS NOTE ADULT - SUBJECTIVE AND OBJECTIVE BOX
Patient is a 62y old  Male who presents with a chief complaint of SOOB (25 Aug 2017 22:27)    HPI:    Patient is a 62 years old Creole speaking Male with no no significant PMH who presents with     MEDICATIONS  (STANDING):  pantoprazole    Tablet 40 milliGRAM(s) Oral two times a day before meals  sucralfate suspension 1 Gram(s) Oral four times a day  furosemide   Injectable 40 milliGRAM(s) IV Push two times a day  metoprolol succinate ER 25 milliGRAM(s) Oral daily  aspirin  chewable 81 milliGRAM(s) Oral daily  atorvastatin 20 milliGRAM(s) Oral at bedtime  buDESOnide 160 MICROgram(s)/formoterol 4.5 MICROgram(s) Inhaler 2 Puff(s) Inhalation two times a day  tiotropium 18 MICROgram(s) Capsule 1 Capsule(s) Inhalation daily  docusate sodium 100 milliGRAM(s) Oral two times a day  senna 2 Tablet(s) Oral at bedtime  multivitamin 1 Tablet(s) Oral daily    MEDICATIONS  (PRN):  ondansetron Injectable 4 milliGRAM(s) IV Push every 6 hours PRN Nausea and/or Vomiting        CAPILLARY BLOOD GLUCOSE        I&O's Summary    30 Aug 2017 07:01  -  31 Aug 2017 07:00  --------------------------------------------------------  IN: 1003 mL / OUT: 1600 mL / NET: -597 mL    31 Aug 2017 07:01  -  31 Aug 2017 18:38  --------------------------------------------------------  IN: 540 mL / OUT: 400 mL / NET: 140 mL        PHYSICAL EXAM:  GENERAL: NAD, well-developed  HEAD:  Atraumatic, Normocephalic  EYES: EOMI, PERRLA, conjunctiva and sclera clear  NECK: Supple, No JVD  CHEST/LUNG: Clear to auscultation bilaterally; No wheeze  HEART: Regular rate and rhythm; No murmurs, rubs, or gallops  ABDOMEN: Soft, Nontender, Nondistended; Bowel sounds present  EXTREMITIES:  2+ Peripheral Pulses, No clubbing, cyanosis, or edema  PSYCH: AAOx3  NEUROLOGY: non-focal  SKIN: No rashes or lesions    LABS:                        14.9   5.0   )-----------( 154      ( 31 Aug 2017 10:54 )             45.5     08-31    136  |  97  |  33<H>  ----------------------------<  139<H>  4.3   |  23  |  1.08    Ca    9.2      31 Aug 2017 06:29  Phos  3.2     08-29  Mg     2.0     08-29      PTT - ( 31 Aug 2017 10:54 )  PTT:> 200 sec  CARDIAC MARKERS ( 31 Aug 2017 06:29 )  x     / <0.01 ng/mL / 161 U/L / x     / 2.4 ng/mL          RADIOLOGY & ADDITIONAL TESTS:    Imaging Personally Reviewed:    Consultant(s) Notes Reviewed:      Care Discussed with Consultants/Other Providers: Patient is a 62y old  Male who presents with a chief complaint of SOOB (25 Aug 2017 22:27)    HPI:    Patient is a 62 years old Creole speaking Male with no no significant PMH who presented 6 days ago with SOB and chest pain. Patient states that he had no hx of heart failure or MI. He was vacationing in [facility] for the past month and his symptoms began a week prior to admission. He endorses chest pain which was substernal, non radiating and worse with exertion along with non productive cough and PND. No orthopnea or weight changes. He also endorses anorexia and weight loss. No recent sickness per se and no known sick contacts. He sought medical attention in [facility] but refused to be admitted to the hospital there. [place] the [place] and came to [facility]. Patient found to be tachycardiac and hypotensive.     During his hospital stay, patient had had a negative CTA but a newly diagnosed systolic CHF with EF 10-15% and severely dilated LA, Severe MR, TR. Patient has been on IV lasix with less than ideal urinary output. RHC done today showed PA systolic pressure of 54, LVEDP 32, CO 1.15, CI 0.63 and EF 10%. Admitted to CCU for close monitoring and diuresis. Cardiac MRI negative for LV thrombus      MEDICATIONS  (STANDING):  pantoprazole    Tablet 40 milliGRAM(s) Oral two times a day before meals  sucralfate suspension 1 Gram(s) Oral four times a day  furosemide   Injectable 40 milliGRAM(s) IV Push two times a day  metoprolol succinate ER 25 milliGRAM(s) Oral daily  aspirin  chewable 81 milliGRAM(s) Oral daily  atorvastatin 20 milliGRAM(s) Oral at bedtime  buDESOnide 160 MICROgram(s)/formoterol 4.5 MICROgram(s) Inhaler 2 Puff(s) Inhalation two times a day  tiotropium 18 MICROgram(s) Capsule 1 Capsule(s) Inhalation daily  docusate sodium 100 milliGRAM(s) Oral two times a day  senna 2 Tablet(s) Oral at bedtime  multivitamin 1 Tablet(s) Oral daily    MEDICATIONS  (PRN):  ondansetron Injectable 4 milliGRAM(s) IV Push every 6 hours PRN Nausea and/or Vomiting      ICU Vital Signs Last 24 Hrs  T(C): 36.4 (31 Aug 2017 18:00), Max: 36.9 (30 Aug 2017 23:31)  T(F): 97.5 (31 Aug 2017 18:00), Max: 98.4 (30 Aug 2017 23:31)  HR: 98 (31 Aug 2017 18:45) (88 - 100)  BP: 101/80 (31 Aug 2017 18:45) (86/61 - 103/74)  BP(mean): 87 (31 Aug 2017 18:45) (70 - 88)  ABP: --  ABP(mean): --  RR: 17 (31 Aug 2017 18:45) (10 - 23)  SpO2: 100% (31 Aug 2017 18:45) (93% - 100%)      I&O's Summary    30 Aug 2017 07:01  -  31 Aug 2017 07:00  --------------------------------------------------------  IN: 1003 mL / OUT: 1600 mL / NET: -597 mL    31 Aug 2017 07:01  -  31 Aug 2017 18:38  --------------------------------------------------------  IN: 540 mL / OUT: 400 mL / NET: 140 mL        PHYSICAL EXAM:  GENERAL: NAD, well-developed  HEAD:  Atraumatic, Normocephalic  EYES: EOMI, PERRLA, conjunctiva and sclera clear  NECK: Supple, + JVD~10 seconds  CHEST/LUNG: Crackles b/l LL fields    HEART: Tachycardic to 100s. Regular rhythm; No murmurs   ABDOMEN: Soft, Nontender, Nondistended; Bowel sounds present  EXTREMITIES:  2+ Peripheral Pulses, No clubbing, cyanosis, or edema--B/L Feet extremely cool to touch   PSYCH: AAOx3  NEUROLOGY: non-focal  SKIN: No rashes or lesions    LABS:                        14.9   5.0   )-----------( 154      ( 31 Aug 2017 10:54 )             45.5     08-31    136  |  97  |  33<H>  ----------------------------<  139<H>  4.3   |  23  |  1.08    Ca    9.2      31 Aug 2017 06:29  Phos  3.2     08-29  Mg     2.0     08-29      PTT - ( 31 Aug 2017 10:54 )  PTT:> 200 sec  CARDIAC MARKERS ( 31 Aug 2017 06:29 )  x     / <0.01 ng/mL / 161 U/L / x     / 2.4 ng/mL    < from: TTE with Doppler (w/Cont) (08.28.17 @ 22:35) >      < end of copied text >          < from: TTE with Doppler (w/Cont) (08.28.17 @ 22:35) >    Dimensions:    Normal Values:  LA:     4.2    2.0 - 4.0 cm  Ao:     3.3    2.0 - 3.8 cm  SEPTUM: 0.7    0.6 - 1.2 cm  PWT:    0.8    0.6 - 1.1 cm  LVIDd:  6.7    3.0 - 5.6 cm  LVIDs:  6.4    1.8 - 4.0 cm  Derived variables:  LVMI: 115 g/m2  RWT: 0.23  Fractional short: 4 %  EF (Visual Estimate): 10-15 %  ------------------------------------------------------------------------  Observations:  Mitral Valve: Thickened mitral valve. Dilated mitral valve  annulus. Tethered posterior leaflet.  Severe, eccentric,  posteriorly-directed mitral regurgitation. Flow reversal is  seen in the pulmonary veins.  Aortic Valve/Aorta: Thickened aortic valve.  Aortic Root: 3.3 cm.  Left Atrium: Severely dilatedleft atrium.  LA volume index  = 63 cc/m2.  Left Ventricle: Endocardial visualization enhanced with  intravenous injection of echo contrast (Definity). Severe  global left ventricular systolic dysfunction with regional  variation (the mid to distal anterior wall and septum are  akinetic). Spontaneous echocardiographic contrast is seen  in the left ventricle. Ill-defined mass at the apex of the  left ventricle. May be left ventricular trabeculation or  thrombus. Consider MRI to further evaluate. Moderate to  severe left ventricular enlargement. E/e' equals 20,  consistent with elevated left ventricular end diastolic  pressure.  Right Heart: Moderate right atrial enlargement. Right  ventricular enlargement with decreased right ventricular  systolic function.  Diastolic septal flattening consistent  with right ventricular volume overload.  Thickened  tricuspid valve. Moderate-severe tricuspid regurgitation.  Normal pulmonic valve. Mild pulmonic regurgitation.  Pericardium/Pleura: Trace pericardial effusion.  Bilateral pleural effusions.  ------------------------------------------------------------------------  Conclusions:  1. Thickened mitral valve. Dilated mitral valve annulus.  Tethered posterior leaflet.  Severe, eccentric,  posteriorly-directed mitral regurgitation. Flow reversal is  seen in the pulmonary veins.  2. Thickened aortic valve.  3. Severely dilated left atrium.  4. Moderate to severe left ventricular enlargement.  5. Endocardial visualization enhanced with intravenous  injection of echo contrast (Definity). Severe global left  ventricular systolic dysfunction with regional variation  (the mid to distal anterior wall and septum are akinetic).  Spontaneous echocardiographic contrast is seen in the left  ventricle. Ill-defined, mass at the apex of the left  ventricle. May be left ventricular trabeculation or  thrombus. Consider MRI to further evaluate.  6. E/e' equals 20, consistent with elevated left  ventricular end diastolic pressure.  7. Moderate right atrial enlargement.  8. Right ventricular enlargement with decreased right  ventricular systolic function.  Diastolic septal flattening  consistent with right ventricular volume overload.  9. Thickened tricuspid valve. Moderate-severe tricuspid  regurgitation.  10. Trace pericardial effusion.  ------------------------------------------------------------------------  Confirmed on  8/28/2017 - 15:54:57 by Matias Stubbs M.D.    < end of copied text >      RADIOLOGY & ADDITIONAL TESTS:    Imaging Personally Reviewed:    Consultant(s) Notes Reviewed:      < from: MRI Cardiac w/wo Cont (08.30.17 @ 14:56) >    INDICATION: Possible left ventricular thrombus or trabeculation.    COMPARISON: CTA 8/25/2017..    TECHNIQUE: Multi-sequential, multi-planar cardiac MRI was performed   before and after the intravenous administration of 100 mL of Gadavist; 0   mL was discarded. Resting myocardial perfusion imaging was performed.   Delayed viability imaging was performed. The indexed values were   calculated based on a BSA of 1.82 m2.    SCANNER: [employer] 1.5 T [place] platform using a cardiac coil.    QUALITY: Fair.      FINDINGS:      MORPHOLOGY:    RIGHT ATRIUM: The right atrium is qualitatively enlarged.     RIGHT VENTRICLE: The right ventricle is qualitatively enlarged.    LEFT ATRIUM: The left atrium is enlarged. The left atrium measures 4.2 cm   in the 3 chamber plane.    LEFT VENTRICLE: The left ventricle is dilated the left ventricle measures   7.7 cm in the short axis (anteroseptal/inferolateral). The late   gadolinium enhanced images were reduced in quality secondary to shallow   respiratory breathing artifact. However, there is likely subtle, late   gadolinium enhancement along the anteroseptal wall.      FUNCTION: The left ventricle is globally hypokinetic.     LEFT VENTRICLE:  Unindexed:  EF: 16.85 %  EDV: 330.67 mL  ESV: 274.97 mL  SV: 55.70 mL  CO: 5.91 L/min    Indexed:  EDVi: 183.05 mL/m2  ESVi: 152.21 mL/m2  SV: 30.84 mL/m2  CO: 3.27 L/min/m2      VALVES:  PULMONIC VALVE: Pulmonic insufficiency.  TRICUSPID VALVE: Tricuspid insufficiency.  MITRAL VALVE: Mitral regurgitation.  AORTIC VALVE: Unremarkable.      AORTA: Three-vessel left-sided aortic arch and left-sided descending   thoracic aorta.      NONCARDIAC FINDINGS: Bilateral pleural effusions. Bilateral atelectasis.   The findings suspicious for edema or better shown on the recent CT.      IMPRESSION:    1.  Technically difficult exam secondary to respiratory motion artifact.  2.  Decreased left ventricular ejection fraction with a calculated   ejection fraction 16.5%.  3.  No convincing findings to suggest ventricular thrombus.  4.  Likely mid myocardial focus of late gadolinium enhancement it is   nonspecific butcan occur in the clinical setting of left ventricular   heart failure.  5.  Mitral valve and tricuspid valve insufficiency.                    PAULINO GREGORY M.D., ATTENDING RADIOLOGIST  This document has been electronically signed. Aug 30 2017  5:33PM    < end of copied text >  Care Discussed with Consultants/Other Providers: Patient is a 62y old  Male who presents with a chief complaint of SOB (25 Aug 2017 22:27)    HPI:    Patient is a 62 years old Creole speaking Male with no no significant PMH who presented 6 days ago with SOB and chest pain. Patient states that he had no hx of heart failure or MI. He was vacationing in [facility] for the past month and his symptoms began a week prior to admission. He endorses chest pain which was substernal, non radiating and worse with exertion along with non productive cough and PND. No orthopnea or weight changes. He also endorses anorexia and weight loss. No recent sickness per se and no known sick contacts. He sought medical attention in [facility] but refused to be admitted to the hospital there. [place] the [place] and came to [facility]. Patient found to be tachycardiac and hypotensive.     During his hospital stay, patient had had a negative CTA but a newly diagnosed systolic CHF with EF 10-15% and severely dilated LA, Severe MR, TR. Patient has been on IV lasix with less than ideal urinary output. RHC done today showed PA systolic pressure of 54, LVEDP 32, CO 1.15, CI 0.63 and EF 10%. Admitted to CCU for close monitoring and diuresis. Cardiac MRI negative for LV thrombus      MEDICATIONS  (STANDING):  pantoprazole    Tablet 40 milliGRAM(s) Oral two times a day before meals  sucralfate suspension 1 Gram(s) Oral four times a day  furosemide   Injectable 40 milliGRAM(s) IV Push two times a day  metoprolol succinate ER 25 milliGRAM(s) Oral daily  aspirin  chewable 81 milliGRAM(s) Oral daily  atorvastatin 20 milliGRAM(s) Oral at bedtime  buDESOnide 160 MICROgram(s)/formoterol 4.5 MICROgram(s) Inhaler 2 Puff(s) Inhalation two times a day  tiotropium 18 MICROgram(s) Capsule 1 Capsule(s) Inhalation daily  docusate sodium 100 milliGRAM(s) Oral two times a day  senna 2 Tablet(s) Oral at bedtime  multivitamin 1 Tablet(s) Oral daily    MEDICATIONS  (PRN):  ondansetron Injectable 4 milliGRAM(s) IV Push every 6 hours PRN Nausea and/or Vomiting      ICU Vital Signs Last 24 Hrs  T(C): 36.4 (31 Aug 2017 18:00), Max: 36.9 (30 Aug 2017 23:31)  T(F): 97.5 (31 Aug 2017 18:00), Max: 98.4 (30 Aug 2017 23:31)  HR: 98 (31 Aug 2017 18:45) (88 - 100)  BP: 101/80 (31 Aug 2017 18:45) (86/61 - 103/74)  BP(mean): 87 (31 Aug 2017 18:45) (70 - 88)  ABP: --  ABP(mean): --  RR: 17 (31 Aug 2017 18:45) (10 - 23)  SpO2: 100% (31 Aug 2017 18:45) (93% - 100%)      I&O's Summary    30 Aug 2017 07:01  -  31 Aug 2017 07:00  --------------------------------------------------------  IN: 1003 mL / OUT: 1600 mL / NET: -597 mL    31 Aug 2017 07:01  -  31 Aug 2017 18:38  --------------------------------------------------------  IN: 540 mL / OUT: 400 mL / NET: 140 mL        PHYSICAL EXAM:  GENERAL: NAD, well-developed  HEAD:  Atraumatic, Normocephalic  EYES: EOMI, PERRLA, conjunctiva and sclera clear  NECK: Supple, + JVD~10 seconds  CHEST/LUNG: Crackles b/l LL fields    HEART: Tachycardic to 100s. Regular rhythm; No murmurs   ABDOMEN: Soft, Nontender, Nondistended; Bowel sounds present  EXTREMITIES:  2+ Peripheral Pulses, No clubbing, cyanosis, or edema--B/L Feet extremely cool to touch   PSYCH: AAOx3  NEUROLOGY: non-focal  SKIN: No rashes or lesions    LABS:                        14.9   5.0   )-----------( 154      ( 31 Aug 2017 10:54 )             45.5     08-31    136  |  97  |  33<H>  ----------------------------<  139<H>  4.3   |  23  |  1.08    Ca    9.2      31 Aug 2017 06:29  Phos  3.2     08-29  Mg     2.0     08-29      PTT - ( 31 Aug 2017 10:54 )  PTT:> 200 sec  CARDIAC MARKERS ( 31 Aug 2017 06:29 )  x     / <0.01 ng/mL / 161 U/L / x     / 2.4 ng/mL    < from: TTE with Doppler (w/Cont) (08.28.17 @ 22:35) >      < end of copied text >          < from: TTE with Doppler (w/Cont) (08.28.17 @ 22:35) >    Dimensions:    Normal Values:  LA:     4.2    2.0 - 4.0 cm  Ao:     3.3    2.0 - 3.8 cm  SEPTUM: 0.7    0.6 - 1.2 cm  PWT:    0.8    0.6 - 1.1 cm  LVIDd:  6.7    3.0 - 5.6 cm  LVIDs:  6.4    1.8 - 4.0 cm  Derived variables:  LVMI: 115 g/m2  RWT: 0.23  Fractional short: 4 %  EF (Visual Estimate): 10-15 %  ------------------------------------------------------------------------  Observations:  Mitral Valve: Thickened mitral valve. Dilated mitral valve  annulus. Tethered posterior leaflet.  Severe, eccentric,  posteriorly-directed mitral regurgitation. Flow reversal is  seen in the pulmonary veins.  Aortic Valve/Aorta: Thickened aortic valve.  Aortic Root: 3.3 cm.  Left Atrium: Severely dilatedleft atrium.  LA volume index  = 63 cc/m2.  Left Ventricle: Endocardial visualization enhanced with  intravenous injection of echo contrast (Definity). Severe  global left ventricular systolic dysfunction with regional  variation (the mid to distal anterior wall and septum are  akinetic). Spontaneous echocardiographic contrast is seen  in the left ventricle. Ill-defined mass at the apex of the  left ventricle. May be left ventricular trabeculation or  thrombus. Consider MRI to further evaluate. Moderate to  severe left ventricular enlargement. E/e' equals 20,  consistent with elevated left ventricular end diastolic  pressure.  Right Heart: Moderate right atrial enlargement. Right  ventricular enlargement with decreased right ventricular  systolic function.  Diastolic septal flattening consistent  with right ventricular volume overload.  Thickened  tricuspid valve. Moderate-severe tricuspid regurgitation.  Normal pulmonic valve. Mild pulmonic regurgitation.  Pericardium/Pleura: Trace pericardial effusion.  Bilateral pleural effusions.  ------------------------------------------------------------------------  Conclusions:  1. Thickened mitral valve. Dilated mitral valve annulus.  Tethered posterior leaflet.  Severe, eccentric,  posteriorly-directed mitral regurgitation. Flow reversal is  seen in the pulmonary veins.  2. Thickened aortic valve.  3. Severely dilated left atrium.  4. Moderate to severe left ventricular enlargement.  5. Endocardial visualization enhanced with intravenous  injection of echo contrast (Definity). Severe global left  ventricular systolic dysfunction with regional variation  (the mid to distal anterior wall and septum are akinetic).  Spontaneous echocardiographic contrast is seen in the left  ventricle. Ill-defined, mass at the apex of the left  ventricle. May be left ventricular trabeculation or  thrombus. Consider MRI to further evaluate.  6. E/e' equals 20, consistent with elevated left  ventricular end diastolic pressure.  7. Moderate right atrial enlargement.  8. Right ventricular enlargement with decreased right  ventricular systolic function.  Diastolic septal flattening  consistent with right ventricular volume overload.  9. Thickened tricuspid valve. Moderate-severe tricuspid  regurgitation.  10. Trace pericardial effusion.  ------------------------------------------------------------------------  Confirmed on  8/28/2017 - 15:54:57 by Matias Stubbs M.D.    < end of copied text >      RADIOLOGY & ADDITIONAL TESTS:    Imaging Personally Reviewed:    Consultant(s) Notes Reviewed:      < from: MRI Cardiac w/wo Cont (08.30.17 @ 14:56) >    INDICATION: Possible left ventricular thrombus or trabeculation.    COMPARISON: CTA 8/25/2017..    TECHNIQUE: Multi-sequential, multi-planar cardiac MRI was performed   before and after the intravenous administration of 100 mL of Gadavist; 0   mL was discarded. Resting myocardial perfusion imaging was performed.   Delayed viability imaging was performed. The indexed values were   calculated based on a BSA of 1.82 m2.    SCANNER: [employer] 1.5 T 5 Minutese platform using a cardiac coil.    QUALITY: Fair.      FINDINGS:      MORPHOLOGY:    RIGHT ATRIUM: The right atrium is qualitatively enlarged.     RIGHT VENTRICLE: The right ventricle is qualitatively enlarged.    LEFT ATRIUM: The left atrium is enlarged. The left atrium measures 4.2 cm   in the 3 chamber plane.    LEFT VENTRICLE: The left ventricle is dilated the left ventricle measures   7.7 cm in the short axis (anteroseptal/inferolateral). The late   gadolinium enhanced images were reduced in quality secondary to shallow   respiratory breathing artifact. However, there is likely subtle, late   gadolinium enhancement along the anteroseptal wall.      FUNCTION: The left ventricle is globally hypokinetic.     LEFT VENTRICLE:  Unindexed:  EF: 16.85 %  EDV: 330.67 mL  ESV: 274.97 mL  SV: 55.70 mL  CO: 5.91 L/min    Indexed:  EDVi: 183.05 mL/m2  ESVi: 152.21 mL/m2  SV: 30.84 mL/m2  CO: 3.27 L/min/m2      VALVES:  PULMONIC VALVE: Pulmonic insufficiency.  TRICUSPID VALVE: Tricuspid insufficiency.  MITRAL VALVE: Mitral regurgitation.  AORTIC VALVE: Unremarkable.      AORTA: Three-vessel left-sided aortic arch and left-sided descending   thoracic aorta.      NONCARDIAC FINDINGS: Bilateral pleural effusions. Bilateral atelectasis.   The findings suspicious for edema or better shown on the recent CT.      IMPRESSION:    1.  Technically difficult exam secondary to respiratory motion artifact.  2.  Decreased left ventricular ejection fraction with a calculated   ejection fraction 16.5%.  3.  No convincing findings to suggest ventricular thrombus.  4.  Likely mid myocardial focus of late gadolinium enhancement it is   nonspecific butcan occur in the clinical setting of left ventricular   heart failure.  5.  Mitral valve and tricuspid valve insufficiency.                    PAULINO GREGORY M.D., ATTENDING RADIOLOGIST  This document has been electronically signed. Aug 30 2017  5:33PM    < end of copied text >  Care Discussed with Consultants/Other Providers: Patient is a 62y old  Male who presents with a chief complaint of SOB (25 Aug 2017 22:27)   Services ID # [id number] used for this encounter     HPI:    Patient is a 62 years old Creole speaking Male with no no significant PMH who presented 6 days ago with SOB and chest pain. Patient states that he had no hx of heart failure or MI. He was vacationing in [facility] for the past month and his symptoms began a week prior to admission. He endorses chest pain which was substernal, non radiating and worse with exertion along with non productive cough and PND. No orthopnea or weight changes. He also endorses anorexia and weight loss. No recent sickness per se and no known sick contacts. He sought medical attention in [facility] but refused to be admitted to the hospital there. [place] and came to [facility]. Patient found to be tachycardiac and hypotensive.     During his hospital stay, patient had had a negative CTA but a newly diagnosed systolic CHF with EF 10-15% and severely dilated LA, Severe MR, TR. Patient has been on IV lasix with less than ideal urinary output. RHC done today showed PA systolic pressure of 54, LVEDP 32, CO 1.15, CI 0.63 and EF 10%. Admitted to CCU for close monitoring and diuresis. Cardiac MRI negative for LV thrombus      MEDICATIONS  (STANDING):  pantoprazole    Tablet 40 milliGRAM(s) Oral two times a day before meals  sucralfate suspension 1 Gram(s) Oral four times a day  furosemide   Injectable 40 milliGRAM(s) IV Push two times a day  metoprolol succinate ER 25 milliGRAM(s) Oral daily  aspirin  chewable 81 milliGRAM(s) Oral daily  atorvastatin 20 milliGRAM(s) Oral at bedtime  buDESOnide 160 MICROgram(s)/formoterol 4.5 MICROgram(s) Inhaler 2 Puff(s) Inhalation two times a day  tiotropium 18 MICROgram(s) Capsule 1 Capsule(s) Inhalation daily  docusate sodium 100 milliGRAM(s) Oral two times a day  senna 2 Tablet(s) Oral at bedtime  multivitamin 1 Tablet(s) Oral daily    MEDICATIONS  (PRN):  ondansetron Injectable 4 milliGRAM(s) IV Push every 6 hours PRN Nausea and/or Vomiting      ICU Vital Signs Last 24 Hrs  T(C): 36.4 (31 Aug 2017 18:00), Max: 36.9 (30 Aug 2017 23:31)  T(F): 97.5 (31 Aug 2017 18:00), Max: 98.4 (30 Aug 2017 23:31)  HR: 98 (31 Aug 2017 18:45) (88 - 100)  BP: 101/80 (31 Aug 2017 18:45) (86/61 - 103/74)  BP(mean): 87 (31 Aug 2017 18:45) (70 - 88)  ABP: --  ABP(mean): --  RR: 17 (31 Aug 2017 18:45) (10 - 23)  SpO2: 100% (31 Aug 2017 18:45) (93% - 100%)      I&O's Summary    30 Aug 2017 07:01  -  31 Aug 2017 07:00  --------------------------------------------------------  IN: 1003 mL / OUT: 1600 mL / NET: -597 mL    31 Aug 2017 07:01  -  31 Aug 2017 18:38  --------------------------------------------------------  IN: 540 mL / OUT: 400 mL / NET: 140 mL        PHYSICAL EXAM:  GENERAL: NAD, well-developed  HEAD:  Atraumatic, Normocephalic  EYES: EOMI, PERRLA, conjunctiva and sclera clear  NECK: Supple, + JVD~10 seconds  CHEST/LUNG: Crackles b/l LL fields    HEART: Tachycardic to 100s. Regular rhythm; No murmurs   ABDOMEN: Soft, Nontender, Nondistended; Bowel sounds present  EXTREMITIES:  2+ Peripheral Pulses, No clubbing, cyanosis, or edema--B/L Feet extremely cool to touch   PSYCH: AAOx3  NEUROLOGY: non-focal  SKIN: No rashes or lesions    LABS:                        14.9   5.0   )-----------( 154      ( 31 Aug 2017 10:54 )             45.5     08-31    136  |  97  |  33<H>  ----------------------------<  139<H>  4.3   |  23  |  1.08    Ca    9.2      31 Aug 2017 06:29  Phos  3.2     08-29  Mg     2.0     08-29      PTT - ( 31 Aug 2017 10:54 )  PTT:> 200 sec  CARDIAC MARKERS ( 31 Aug 2017 06:29 )  x     / <0.01 ng/mL / 161 U/L / x     / 2.4 ng/mL    < from: TTE with Doppler (w/Cont) (08.28.17 @ 22:35) >      < end of copied text >          < from: TTE with Doppler (w/Cont) (08.28.17 @ 22:35) >    Dimensions:    Normal Values:  LA:     4.2    2.0 - 4.0 cm  Ao:     3.3    2.0 - 3.8 cm  SEPTUM: 0.7    0.6 - 1.2 cm  PWT:    0.8    0.6 - 1.1 cm  LVIDd:  6.7    3.0 - 5.6 cm  LVIDs:  6.4    1.8 - 4.0 cm  Derived variables:  LVMI: 115 g/m2  RWT: 0.23  Fractional short: 4 %  EF (Visual Estimate): 10-15 %  ------------------------------------------------------------------------  Observations:  Mitral Valve: Thickened mitral valve. Dilated mitral valve  annulus. Tethered posterior leaflet.  Severe, eccentric,  posteriorly-directed mitral regurgitation. Flow reversal is  seen in the pulmonary veins.  Aortic Valve/Aorta: Thickened aortic valve.  Aortic Root: 3.3 cm.  Left Atrium: Severely dilatedleft atrium.  LA volume index  = 63 cc/m2.  Left Ventricle: Endocardial visualization enhanced with  intravenous injection of echo contrast (Definity). Severe  global left ventricular systolic dysfunction with regional  variation (the mid to distal anterior wall and septum are  akinetic). Spontaneous echocardiographic contrast is seen  in the left ventricle. Ill-defined mass at the apex of the  left ventricle. May be left ventricular trabeculation or  thrombus. Consider MRI to further evaluate. Moderate to  severe left ventricular enlargement. E/e' equals 20,  consistent with elevated left ventricular end diastolic  pressure.  Right Heart: Moderate right atrial enlargement. Right  ventricular enlargement with decreased right ventricular  systolic function.  Diastolic septal flattening consistent  with right ventricular volume overload.  Thickened  tricuspid valve. Moderate-severe tricuspid regurgitation.  Normal pulmonic valve. Mild pulmonic regurgitation.  Pericardium/Pleura: Trace pericardial effusion.  Bilateral pleural effusions.  ------------------------------------------------------------------------  Conclusions:  1. Thickened mitral valve. Dilated mitral valve annulus.  Tethered posterior leaflet.  Severe, eccentric,  posteriorly-directed mitral regurgitation. Flow reversal is  seen in the pulmonary veins.  2. Thickened aortic valve.  3. Severely dilated left atrium.  4. Moderate to severe left ventricular enlargement.  5. Endocardial visualization enhanced with intravenous  injection of echo contrast (Definity). Severe global left  ventricular systolic dysfunction with regional variation  (the mid to distal anterior wall and septum are akinetic).  Spontaneous echocardiographic contrast is seen in the left  ventricle. Ill-defined, mass at the apex of the left  ventricle. May be left ventricular trabeculation or  thrombus. Consider MRI to further evaluate.  6. E/e' equals 20, consistent with elevated left  ventricular end diastolic pressure.  7. Moderate right atrial enlargement.  8. Right ventricular enlargement with decreased right  ventricular systolic function.  Diastolic septal flattening  consistent with right ventricular volume overload.  9. Thickened tricuspid valve. Moderate-severe tricuspid  regurgitation.  10. Trace pericardial effusion.  ------------------------------------------------------------------------  Confirmed on  8/28/2017 - 15:54:57 by Matias Stubbs M.D.    < end of copied text >      RADIOLOGY & ADDITIONAL TESTS:    Imaging Personally Reviewed:    Consultant(s) Notes Reviewed:      < from: MRI Cardiac w/wo Cont (08.30.17 @ 14:56) >    INDICATION: Possible left ventricular thrombus or trabeculation.    COMPARISON: CTA 8/25/2017..    TECHNIQUE: Multi-sequential, multi-planar cardiac MRI was performed   before and after the intravenous administration of 100 mL of Gadavist; 0   mL was discarded. Resting myocardial perfusion imaging was performed.   Delayed viability imaging was performed. The indexed values were   calculated based on a BSA of 1.82 m2.    SCANNER: [employer] 1.5 T "[employer]e platform using a cardiac coil.    QUALITY: Fair.      FINDINGS:      MORPHOLOGY:    RIGHT ATRIUM: The right atrium is qualitatively enlarged.     RIGHT VENTRICLE: The right ventricle is qualitatively enlarged.    LEFT ATRIUM: The left atrium is enlarged. The left atrium measures 4.2 cm   in the 3 chamber plane.    LEFT VENTRICLE: The left ventricle is dilated the left ventricle measures   7.7 cm in the short axis (anteroseptal/inferolateral). The late   gadolinium enhanced images were reduced in quality secondary to shallow   respiratory breathing artifact. However, there is likely subtle, late   gadolinium enhancement along the anteroseptal wall.      FUNCTION: The left ventricle is globally hypokinetic.     LEFT VENTRICLE:  Unindexed:  EF: 16.85 %  EDV: 330.67 mL  ESV: 274.97 mL  SV: 55.70 mL  CO: 5.91 L/min    Indexed:  EDVi: 183.05 mL/m2  ESVi: 152.21 mL/m2  SV: 30.84 mL/m2  CO: 3.27 L/min/m2      VALVES:  PULMONIC VALVE: Pulmonic insufficiency.  TRICUSPID VALVE: Tricuspid insufficiency.  MITRAL VALVE: Mitral regurgitation.  AORTIC VALVE: Unremarkable.      AORTA: Three-vessel left-sided aortic arch and left-sided descending   thoracic aorta.      NONCARDIAC FINDINGS: Bilateral pleural effusions. Bilateral atelectasis.   The findings suspicious for edema or better shown on the recent CT.      IMPRESSION:    1.  Technically difficult exam secondary to respiratory motion artifact.  2.  Decreased left ventricular ejection fraction with a calculated   ejection fraction 16.5%.  3.  No convincing findings to suggest ventricular thrombus.  4.  Likely mid myocardial focus of late gadolinium enhancement it is   nonspecific butcan occur in the clinical setting of left ventricular   heart failure.  5.  Mitral valve and tricuspid valve insufficiency.                    PAULINO GREGORY M.D., ATTENDING RADIOLOGIST  This document has been electronically signed. Aug 30 2017  5:33PM    < end of copied text >  Care Discussed with Consultants/Other Providers:

## 2017-08-31 NOTE — PROGRESS NOTE ADULT - ASSESSMENT
Patient is a 62 years old Creole speaking Male with no no significant PMH who is newly diagnosed with systolic CHF. RHC showing systolic PA 54 and CO 1.15, CI 0.63, EF ~10%.

## 2017-08-31 NOTE — PROGRESS NOTE ADULT - SUBJECTIVE AND OBJECTIVE BOX
Patient is a 62y old  Male who presents with a chief complaint of SOOB (25 Aug 2017 22:27)      SUBJECTIVE / OVERNIGHT EVENTS:  Mild abdominal discomfort +  No SOB  Review of Systems:   CONSTITUTIONAL: No fever, weight loss, or fatigue  EYES: No eye pain, visual disturbances, or discharge  ENMT:  No difficulty hearing, tinnitus, vertigo; No sinus or throat pain  NECK: No pain or stiffness  BREASTS: No pain, masses, or nipple discharge  RESPIRATORY: No cough, wheezing, chills or hemoptysis; No shortness of breath  CARDIOVASCULAR: No chest pain, palpitations, dizziness, or leg swelling  GASTROINTESTINAL: No abdominal or epigastric pain. No nausea, vomiting, or hematemesis; No diarrhea or constipation. No melena or hematochezia.  GENITOURINARY: No dysuria, frequency, hematuria, or incontinence  NEUROLOGICAL: No headaches, memory loss, loss of strength, numbness, or tremors  SKIN: No itching, burning, rashes, or lesions   LYMPH NODES: No enlarged glands  ENDOCRINE: No heat or cold intolerance; No hair loss  MUSCULOSKELETAL: No joint pain or swelling; No muscle, back, or extremity pain  PSYCHIATRIC: No depression, anxiety, mood swings, or difficulty sleeping  HEME/LYMPH: No easy bruising, or bleeding gums  ALLERY AND IMMUNOLOGIC: No hives or eczema    MEDICATIONS  (STANDING):  pantoprazole    Tablet 40 milliGRAM(s) Oral two times a day before meals  sucralfate suspension 1 Gram(s) Oral four times a day  furosemide   Injectable 40 milliGRAM(s) IV Push two times a day  metoprolol succinate ER 25 milliGRAM(s) Oral daily  aspirin  chewable 81 milliGRAM(s) Oral daily  atorvastatin 20 milliGRAM(s) Oral at bedtime  buDESOnide 160 MICROgram(s)/formoterol 4.5 MICROgram(s) Inhaler 2 Puff(s) Inhalation two times a day  tiotropium 18 MICROgram(s) Capsule 1 Capsule(s) Inhalation daily  docusate sodium 100 milliGRAM(s) Oral two times a day  senna 2 Tablet(s) Oral at bedtime  multivitamin 1 Tablet(s) Oral daily    MEDICATIONS  (PRN):  ondansetron Injectable 4 milliGRAM(s) IV Push every 6 hours PRN Nausea and/or Vomiting      PHYSICAL EXAM:  Vital Signs Last 24 Hrs  T(C): 36.6 (31 Aug 2017 12:02), Max: 36.9 (30 Aug 2017 23:31)  T(F): 97.8 (31 Aug 2017 12:02), Max: 98.4 (30 Aug 2017 23:31)  HR: 88 (31 Aug 2017 12:02) (88 - 95)  BP: 103/74 (31 Aug 2017 12:02) (91/64 - 103/74)  BP(mean): --  RR: 18 (31 Aug 2017 12:02) (18 - 18)  SpO2: 100% (31 Aug 2017 12:02) (93% - 100%)  I&O's Summary    30 Aug 2017 07:01  -  31 Aug 2017 07:00  --------------------------------------------------------  IN: 1003 mL / OUT: 1600 mL / NET: -597 mL    31 Aug 2017 07:01  -  31 Aug 2017 17:40  --------------------------------------------------------  IN: 540 mL / OUT: 400 mL / NET: 140 mL      GENERAL: NAD, well-developed  HEAD:  Atraumatic, Normocephalic  EYES: EOMI, PERRLA, conjunctiva and sclera clear  NECK: Supple, No JVD  CHEST/LUNG: Clear to auscultation bilaterally; No wheeze  HEART: Regular rate and rhythm; No murmurs, rubs, or gallops  ABDOMEN: Soft, Nontender, Nondistended; Bowel sounds present  EXTREMITIES:  2+ Peripheral Pulses, No clubbing, cyanosis, or edema  PSYCH: AAOx3  NEUROLOGY: non-focal  SKIN: No rashes or lesions    LABS:  CAPILLARY BLOOD GLUCOSE                              14.9   5.0   )-----------( 154      ( 31 Aug 2017 10:54 )             45.5     08-31    136  |  97  |  33<H>  ----------------------------<  139<H>  4.3   |  23  |  1.08    Ca    9.2      31 Aug 2017 06:29  Phos  3.2     08-29  Mg     2.0     08-29      PTT - ( 31 Aug 2017 10:54 )  PTT:> 200 sec  CARDIAC MARKERS ( 31 Aug 2017 06:29 )  x     / <0.01 ng/mL / 161 U/L / x     / 2.4 ng/mL          RADIOLOGY & ADDITIONAL TESTS:    Imaging Personally Reviewed:    Consultant(s) Notes Reviewed:      Care Discussed with Consultants/Other Providers:

## 2017-08-31 NOTE — PROGRESS NOTE ADULT - SUBJECTIVE AND OBJECTIVE BOX
Patient is a 62y old  Male who presents with a chief complaint of SOB (25 Aug 2017 22:27)    pt doing ok  no SOB   no cough  no phlegm   complaining of abdominal pain       Any change in ROS:     MEDICATIONS  (STANDING):  pantoprazole    Tablet 40 milliGRAM(s) Oral two times a day before meals  sucralfate suspension 1 Gram(s) Oral four times a day  furosemide   Injectable 40 milliGRAM(s) IV Push two times a day  heparin  Infusion.  Unit(s)/Hr (12 mL/Hr) IV Continuous <Continuous>  metoprolol succinate ER 25 milliGRAM(s) Oral daily  aspirin  chewable 81 milliGRAM(s) Oral daily  atorvastatin 20 milliGRAM(s) Oral at bedtime  buDESOnide 160 MICROgram(s)/formoterol 4.5 MICROgram(s) Inhaler 2 Puff(s) Inhalation two times a day  tiotropium 18 MICROgram(s) Capsule 1 Capsule(s) Inhalation daily  docusate sodium 100 milliGRAM(s) Oral two times a day  senna 2 Tablet(s) Oral at bedtime  multivitamin 1 Tablet(s) Oral daily    MEDICATIONS  (PRN):  ondansetron Injectable 4 milliGRAM(s) IV Push every 6 hours PRN Nausea and/or Vomiting  heparin  Injectable 5500 Unit(s) IV Push every 6 hours PRN For aPTT less than 40  heparin  Injectable 2500 Unit(s) IV Push every 6 hours PRN For aPTT between 40 - 57    Vital Signs Last 24 Hrs  T(C): 36.4 (31 Aug 2017 04:00), Max: 36.9 (30 Aug 2017 23:31)  T(F): 97.5 (31 Aug 2017 04:00), Max: 98.4 (30 Aug 2017 23:31)  HR: 94 (31 Aug 2017 09:50) (85 - 95)  BP: 103/68 (31 Aug 2017 05:10) (91/64 - 103/73)  BP(mean): --  RR: 18 (31 Aug 2017 04:00) (17 - 18)  SpO2: 93% (31 Aug 2017 09:50) (93% - 100%)    I&O's Summary    30 Aug 2017 07:01  -  31 Aug 2017 07:00  --------------------------------------------------------  IN: 1003 mL / OUT: 1600 mL / NET: -597 mL    31 Aug 2017 07:01  -  31 Aug 2017 09:58  --------------------------------------------------------  IN: 0 mL / OUT: 400 mL / NET: -400 mL          Physical Exam:   GENERAL: NAD, well-groomed, well-developed  HEENT: ALONSO/   Atraumatic, Normocephalic  ENMT: No tonsillar erythema, exudates, or enlargement; Moist mucous membranes, Good dentition, No lesions  NECK: Supple, No JVD, Normal thyroid  CHEST/LUNG: No wheezing   CVS: Regular rate and rhythm; No murmurs, rubs, or gallops  GI: : Soft, Nontender, Nondistended; Bowel sounds present  NERVOUS SYSTEM:  Alert & Oriented X3  EXTREMITIES: mild edema  LYMPH: No lymphadenopathy noted  SKIN: No rashes or lesions  ENDOCRINOLOGY: No Thyromegaly  PSYCH: Appropriate    Labs:  ABG - ( 30 Aug 2017 10:28 )  pH: 7.43  /  pCO2: 43    /  pO2: 59    / HCO3: 28    / Base Excess: 3.7   /  SaO2: 87        29  CARDIAC MARKERS ( 31 Aug 2017 06:29 )  x     / <0.01 ng/mL / 161 U/L / x     / 2.4 ng/mL                            14.5   5.3   )-----------( 150      ( 30 Aug 2017 10:45 )             43.9                         16.2   7.4   )-----------( 151      ( 29 Aug 2017 09:56 )             51.2                         14.7   7.0   )-----------( 150      ( 29 Aug 2017 00:38 )             45.7     08-31    136  |  97  |  33<H>  ----------------------------<  139<H>  4.3   |  23  |  1.08  08-30    135  |  98  |  39<H>  ----------------------------<  163<H>  4.2   |  22  |  1.21  08-29    137  |  98  |  39<H>  ----------------------------<  184<H>  4.2   |  26  |  1.26    Ca    9.2      31 Aug 2017 06:29  Ca    9.3      30 Aug 2017 10:45  Ca    9.2      29 Aug 2017 20:55  Phos  3.2     08-29  Mg     2.0     08-29      CAPILLARY BLOOD GLUCOSE            PTT - ( 31 Aug 2017 01:32 )  PTT:94.1 sec    D-Dimer Assay, Quantitative: 4027 ng/mL DDU (08-25 @ 14:41)  Cultures:                       Rapid Respiratory Viral Panel Result        08-25 @ 15:06  Rapid RVP NotDetec  Coronovirus --  Adenovirus --  Bordetella Pertussis --  Chlamydia Pneumonia --  Entero/Rhinovirus--  HKU1 Coronovirus --  HMPV Coronovirus --  Influenza A --  Influenza AH1 --  Influenza AH1 2009 --  Influenza AH3 --  Influenza B --  Mycoplasma Pneumoniae --  NL63 Coronovirus --  OC43 Coronovirus --  Parainfluenza 1 --  Parainfluenza 2 --  Parainfluenza 3 --  Parainfluenza 4 --  Resp Syncytial Virus --          Studies  Chest X-RAY  CT SCAN Chest   Venous Dopplers: LE:   CT Abdomen  Others      < from: MRI Cardiac w/wo Cont (08.30.17 @ 14:56) >  thoracic aorta.      NONCARDIAC FINDINGS: Bilateral pleural effusions. Bilateral atelectasis.   The findings suspicious for edema or better shown on the recent CT.      IMPRESSION:    1.  Technically difficult exam secondary to respiratory motion artifact.  2.  Decreased left ventricular ejection fraction with a calculated   ejection fraction 16.5%.  3.  No convincing findings to suggest ventricular thrombus.  4.  Likely mid myocardial focus of late gadolinium enhancement it is   nonspecific butcan occur in the clinical setting of left ventricular   heart failure.  5.  Mitral valve and tricuspid valve insufficiency.                    PAULINO GREGORY M.D., ATTENDING RADIOLOGIST  This document has been electronically signed. Aug 30 2017  5:33PM    < end of copied text >

## 2017-08-31 NOTE — PROGRESS NOTE ADULT - PROBLEM SELECTOR PLAN 1
no findings on CT  echo reviewed  suspect patients abdominal pain secondary to CHF  diet as tolerated  chf plan per cardiology  hopefully pain should resolve once deemed euvolemic

## 2017-08-31 NOTE — PROGRESS NOTE ADULT - SUBJECTIVE AND OBJECTIVE BOX
INTERVAL HPI/OVERNIGHT EVENTS:        tolerating diet  still having abdominal pain    MEDICATIONS  (STANDING):  heparin  Injectable 5000 Unit(s) SubCutaneous every 8 hours  pantoprazole    Tablet 40 milliGRAM(s) Oral two times a day before meals  sucralfate suspension 1 Gram(s) Oral four times a day  furosemide   Injectable 40 milliGRAM(s) IV Push two times a day    MEDICATIONS  (PRN):  ondansetron Injectable 4 milliGRAM(s) IV Push every 6 hours PRN Nausea and/or Vomiting      Allergies    No Known Allergies    Intolerances        ROS:   General:  No wt loss, fevers, chills, night sweats, fatigue,   Eyes:  Good vision, no reported pain  ENT:  No sore throat, pain, runny nose, dysphagia  CV:  No pain, palpitations, hypo/hypertension  Resp:  No dyspnea, cough, tachypnea, wheezing  GI:  No pain, No nausea, No vomiting, No diarrhea, No constipation, No weight loss, No fever, No pruritis, No rectal bleeding, No tarry stools, No dysphagia,  :  No pain, bleeding, incontinence, nocturia  Muscle:  No pain, weakness  Neuro:  No weakness, tingling, memory problems  Psych:  No fatigue, insomnia, mood problems, depression  Endocrine:  No polyuria, polydipsia, cold/heat intolerance  Heme:  No petechiae, ecchymosis, easy bruisability  Skin:  No rash, tattoos, scars, edema      PHYSICAL EXAM:   Vital Signs:  Vital Signs Last 24 Hrs  T(C): 36.6 (28 Aug 2017 12:05), Max: 36.6 (27 Aug 2017 21:17)  T(F): 97.8 (28 Aug 2017 12:05), Max: 97.8 (27 Aug 2017 21:17)  HR: 104 (28 Aug 2017 12:05) (97 - 104)  BP: 109/82 (28 Aug 2017 12:05) (97/67 - 109/82)  BP(mean): --  RR: 18 (28 Aug 2017 12:05) (18 - 18)  SpO2: 93% (28 Aug 2017 12:05) (93% - 97%)  Daily     Daily Weight in k.5 (28 Aug 2017 04:17)    GENERAL:  Appears stated age, well-groomed, well-nourished, no distress  HEENT:  NC/AT,  conjunctivae clear and pink, no thyromegaly, nodules, adenopathy, no JVD, sclera -anicteric  CHEST:  Full & symmetric excursion, no increased effort, breath sounds clear  HEART:  Regular rhythm, S1, S2, no murmur/rub/S3/S4, no abdominal bruit, no edema  ABDOMEN:  Soft, non-tender, non-distended, normoactive bowel sounds,  no masses ,no hepato-splenomegaly, no signs of chronic liver disease  EXTEREMITIES:  no cyanosis,clubbing or edema  SKIN:  No rash/erythema/ecchymoses/petechiae/wounds/abscess/warm/dry  NEURO:  Alert, oriented, no asterixis, no tremor, no encephalopathy      LABS:                        14.6   4.8   )-----------( 149      ( 27 Aug 2017 09:18 )             44.7             Urinalysis Basic - ( 27 Aug 2017 18:36 )    Color: Yellow / Appearance: Clear / SG: >1.030 / pH: x  Gluc: x / Ketone: Trace  / Bili: Negative / Urobili: Negative   Blood: x / Protein: 30 mg/dL / Nitrite: Negative   Leuk Esterase: Negative / RBC: 2-5 /HPF / WBC 0-2 /HPF   Sq Epi: x / Non Sq Epi: x / Bacteria: x        RADIOLOGY & ADDITIONAL TESTS:

## 2017-08-31 NOTE — PROGRESS NOTE ADULT - PROBLEM SELECTOR PLAN 1
EF 10% with CO 1.15, CI 0.63  PA Systolic 54--significant PA htn in setting of left heart failure   c/w IV lasix and titrate to keep net negative   c/w Beta Blocker. Will need [facility] EF 10% with CO 1.15, CI 0.63  PA Systolic 54--significant PA htn in setting of left heart failure   c/w IV lasix and titrate to keep net negative   c/w Beta Blocker. Non ischemic cardiomyopathy as LHC unremarkable   Monitor Strict Is/Os and increase Lasix if needed  May need ionophores--tachycardic at this time  Life Vest eval given EF~10%? EF 10% with CO 1.15, CI 0.63  PA Systolic 54--significant PA htn in setting of left heart failure   c/w IV lasix and titrate to keep net negative   c/w Beta Blocker. Non ischemic cardiomyopathy as LHC unremarkable   Monitor Strict Is/Os and increase Lasix if needed  May need inotrope assisted diuresis--stable and making urine at this time   Life Vest eval given EF~10%?

## 2017-08-31 NOTE — PROGRESS NOTE ADULT - PROBLEM SELECTOR PLAN 1
continue current bronchodilators. The ABG is with room air hypoxia:  currently his room air oxygen is 93%  would need to document desaturation on ra to less then 88%, to get home oxygen

## 2017-08-31 NOTE — CHART NOTE - NSCHARTNOTEFT_GEN_A_CORE
====================  CCU MIDNIGHT ROUNDS  ====================    RICKY JOINT  [id number]    ====================  SUMMARY:  ====================    61 y/o Creole-speaking a/w ADHF. TTE: EF:10-15%, severe MR, global LVSD, SEC in LV, decreased RVSF, mod-severe MR. MRI w/ out LV thrombus. S/p L and RHC today. . EF 10-15%, EDP 32, inc pulm pressures and wedge, CO 1.15, CI .6. No blockages. transferred to CCU     ====================  NEW EVENTS:  ====================    Warm extremities, mentating appropriately. no CP/palpitations/SOB. R groin benign    ====================  VITALS (Last 12 hrs):  ====================    T(C): 36.4 (08-31-17 @ 18:00), Max: 36.6 (08-31-17 @ 12:02)  HR: 94 (08-31-17 @ 22:00) (88 - 100)  BP: 82/63 (08-31-17 @ 22:00) (82/63 - 112/86)  BP(mean): 70 (08-31-17 @ 22:00) (70 - 94)  RR: 18 (08-31-17 @ 22:00) (10 - 23)  SpO2: 98% (08-31-17 @ 22:00) (93% - 100%)    TELEMETRY: sinus     I&O's Summary    30 Aug 2017 07:01  -  31 Aug 2017 07:00  --------------------------------------------------------  IN: 1003 mL / OUT: 1600 mL / NET: -597 mL    31 Aug 2017 07:01  -  31 Aug 2017 22:30  --------------------------------------------------------  IN: 660 mL / OUT: 800 mL / NET: -140 mL    ====================  PLAN:  ====================  ADHF/ low flow state - patient w/ warm extremities, no JVD, clear lungs.   c/w IV diuresis to keep net negative, strict I&Os, daily weights, K >4, Mg >2    Wandy Stanley U NP   #[id number]

## 2017-09-01 DIAGNOSIS — N18.3 CHRONIC KIDNEY DISEASE, STAGE 3 (MODERATE): ICD-10-CM

## 2017-09-01 LAB
ALBUMIN SERPL ELPH-MCNC: 3.5 G/DL — SIGNIFICANT CHANGE UP (ref 3.3–5)
ALBUMIN SERPL ELPH-MCNC: 3.6 G/DL — SIGNIFICANT CHANGE UP (ref 3.3–5)
ALP SERPL-CCNC: 121 U/L — HIGH (ref 40–120)
ALP SERPL-CCNC: 127 U/L — HIGH (ref 40–120)
ALT FLD-CCNC: 118 U/L RC — HIGH (ref 10–45)
ALT FLD-CCNC: 147 U/L RC — HIGH (ref 10–45)
ANION GAP SERPL CALC-SCNC: 15 MMOL/L — SIGNIFICANT CHANGE UP (ref 5–17)
ANION GAP SERPL CALC-SCNC: 15 MMOL/L — SIGNIFICANT CHANGE UP (ref 5–17)
AST SERPL-CCNC: 58 U/L — HIGH (ref 10–40)
AST SERPL-CCNC: 73 U/L — HIGH (ref 10–40)
BASE EXCESS BLDMV CALC-SCNC: 6.4 MMOL/L — HIGH (ref -3–3)
BASE EXCESS BLDMV CALC-SCNC: 6.5 MMOL/L — HIGH (ref -3–3)
BASOPHILS # BLD AUTO: 0.1 K/UL — SIGNIFICANT CHANGE UP (ref 0–0.2)
BASOPHILS # BLD AUTO: 0.1 K/UL — SIGNIFICANT CHANGE UP (ref 0–0.2)
BASOPHILS NFR BLD AUTO: 1.7 % — SIGNIFICANT CHANGE UP (ref 0–2)
BILIRUB DIRECT SERPL-MCNC: 0.2 MG/DL — SIGNIFICANT CHANGE UP (ref 0–0.2)
BILIRUB SERPL-MCNC: 0.6 MG/DL — SIGNIFICANT CHANGE UP (ref 0.2–1.2)
BILIRUB SERPL-MCNC: 0.6 MG/DL — SIGNIFICANT CHANGE UP (ref 0.2–1.2)
BLD GP AB SCN SERPL QL: NEGATIVE — SIGNIFICANT CHANGE UP
BUN SERPL-MCNC: 35 MG/DL — HIGH (ref 7–23)
BUN SERPL-MCNC: 40 MG/DL — HIGH (ref 7–23)
CALCIUM SERPL-MCNC: 9.1 MG/DL — SIGNIFICANT CHANGE UP (ref 8.4–10.5)
CALCIUM SERPL-MCNC: 9.3 MG/DL — SIGNIFICANT CHANGE UP (ref 8.4–10.5)
CHLORIDE SERPL-SCNC: 95 MMOL/L — LOW (ref 96–108)
CHLORIDE SERPL-SCNC: 96 MMOL/L — SIGNIFICANT CHANGE UP (ref 96–108)
CO2 BLDMV-SCNC: 32 MMOL/L — HIGH (ref 21–29)
CO2 BLDMV-SCNC: 32 MMOL/L — HIGH (ref 21–29)
CO2 SERPL-SCNC: 22 MMOL/L — SIGNIFICANT CHANGE UP (ref 22–31)
CO2 SERPL-SCNC: 25 MMOL/L — SIGNIFICANT CHANGE UP (ref 22–31)
CREAT SERPL-MCNC: 1.19 MG/DL — SIGNIFICANT CHANGE UP (ref 0.5–1.3)
CREAT SERPL-MCNC: 1.28 MG/DL — SIGNIFICANT CHANGE UP (ref 0.5–1.3)
EOSINOPHIL # BLD AUTO: 0.1 K/UL — SIGNIFICANT CHANGE UP (ref 0–0.5)
EOSINOPHIL # BLD AUTO: 0.1 K/UL — SIGNIFICANT CHANGE UP (ref 0–0.5)
EOSINOPHIL NFR BLD AUTO: 1.6 % — SIGNIFICANT CHANGE UP (ref 0–6)
ERYTHROCYTE [SEDIMENTATION RATE] IN BLOOD: 6 MM/HR — SIGNIFICANT CHANGE UP (ref 0–20)
FERRITIN SERPL-MCNC: 286 NG/ML — SIGNIFICANT CHANGE UP (ref 30–400)
GAS PNL BLDA: SIGNIFICANT CHANGE UP
GAS PNL BLDA: SIGNIFICANT CHANGE UP
GAS PNL BLDMV: SIGNIFICANT CHANGE UP
GAS PNL BLDMV: SIGNIFICANT CHANGE UP
GLUCOSE SERPL-MCNC: 158 MG/DL — HIGH (ref 70–99)
GLUCOSE SERPL-MCNC: 202 MG/DL — HIGH (ref 70–99)
HAV IGM SER-ACNC: SIGNIFICANT CHANGE UP
HBA1C BLD-MCNC: 6.2 % — HIGH (ref 4–5.6)
HBV CORE IGM SER-ACNC: SIGNIFICANT CHANGE UP
HBV SURFACE AG SER-ACNC: SIGNIFICANT CHANGE UP
HCO3 BLDMV-SCNC: 31 MMOL/L — HIGH (ref 20–28)
HCO3 BLDMV-SCNC: 31 MMOL/L — HIGH (ref 20–28)
HCT VFR BLD CALC: 43.5 % — SIGNIFICANT CHANGE UP (ref 39–50)
HCT VFR BLD CALC: 45.3 % — SIGNIFICANT CHANGE UP (ref 39–50)
HCT VFR BLD CALC: 45.4 % — SIGNIFICANT CHANGE UP (ref 39–50)
HCV AB S/CO SERPL IA: 0.08 S/CO — SIGNIFICANT CHANGE UP
HCV AB SERPL-IMP: SIGNIFICANT CHANGE UP
HGB BLD-MCNC: 14.8 G/DL — SIGNIFICANT CHANGE UP (ref 13–17)
HGB BLD-MCNC: 14.9 G/DL — SIGNIFICANT CHANGE UP (ref 13–17)
HGB BLD-MCNC: 15.3 G/DL — SIGNIFICANT CHANGE UP (ref 13–17)
HOROWITZ INDEX BLDMV+IHG-RTO: 21 — SIGNIFICANT CHANGE UP
IRON SATN MFR SERPL: 17 % — SIGNIFICANT CHANGE UP (ref 16–55)
IRON SATN MFR SERPL: 53 UG/DL — SIGNIFICANT CHANGE UP (ref 45–165)
LACTATE SERPL-SCNC: 2.9 MMOL/L — HIGH (ref 0.7–2)
LYMPHOCYTES # BLD AUTO: 1.8 K/UL — SIGNIFICANT CHANGE UP (ref 1–3.3)
LYMPHOCYTES # BLD AUTO: 2 K/UL — SIGNIFICANT CHANGE UP (ref 1–3.3)
LYMPHOCYTES # BLD AUTO: 34.1 % — SIGNIFICANT CHANGE UP (ref 13–44)
LYMPHOCYTES # BLD AUTO: 65 % — HIGH (ref 13–44)
MAGNESIUM SERPL-MCNC: 2.4 MG/DL — SIGNIFICANT CHANGE UP (ref 1.6–2.6)
MAGNESIUM SERPL-MCNC: 2.5 MG/DL — SIGNIFICANT CHANGE UP (ref 1.6–2.6)
MCHC RBC-ENTMCNC: 32.3 PG — SIGNIFICANT CHANGE UP (ref 27–34)
MCHC RBC-ENTMCNC: 32.8 GM/DL — SIGNIFICANT CHANGE UP (ref 32–36)
MCHC RBC-ENTMCNC: 32.9 PG — SIGNIFICANT CHANGE UP (ref 27–34)
MCHC RBC-ENTMCNC: 33.2 PG — SIGNIFICANT CHANGE UP (ref 27–34)
MCHC RBC-ENTMCNC: 33.8 GM/DL — SIGNIFICANT CHANGE UP (ref 32–36)
MCHC RBC-ENTMCNC: 34 GM/DL — SIGNIFICANT CHANGE UP (ref 32–36)
MCV RBC AUTO: 97.4 FL — SIGNIFICANT CHANGE UP (ref 80–100)
MCV RBC AUTO: 97.8 FL — SIGNIFICANT CHANGE UP (ref 80–100)
MCV RBC AUTO: 98.7 FL — SIGNIFICANT CHANGE UP (ref 80–100)
MONOCYTES # BLD AUTO: 0.6 K/UL — SIGNIFICANT CHANGE UP (ref 0–0.9)
MONOCYTES # BLD AUTO: 0.7 K/UL — SIGNIFICANT CHANGE UP (ref 0–0.9)
MONOCYTES NFR BLD AUTO: 11.3 % — SIGNIFICANT CHANGE UP (ref 2–14)
MONOCYTES NFR BLD AUTO: 5 % — SIGNIFICANT CHANGE UP (ref 2–14)
NEUTROPHILS # BLD AUTO: 2.4 K/UL — SIGNIFICANT CHANGE UP (ref 1.8–7.4)
NEUTROPHILS # BLD AUTO: 2.7 K/UL — SIGNIFICANT CHANGE UP (ref 1.8–7.4)
NEUTROPHILS NFR BLD AUTO: 30 % — LOW (ref 43–77)
NEUTROPHILS NFR BLD AUTO: 51.2 % — SIGNIFICANT CHANGE UP (ref 43–77)
O2 CT VFR BLD CALC: 27 MMHG — LOW (ref 30–65)
O2 CT VFR BLD CALC: 32 MMHG — SIGNIFICANT CHANGE UP (ref 30–65)
PCO2 BLDMV: 45 MMHG — SIGNIFICANT CHANGE UP (ref 30–65)
PCO2 BLDMV: 46 MMHG — SIGNIFICANT CHANGE UP (ref 30–65)
PH BLDMV: 7.45 — SIGNIFICANT CHANGE UP (ref 7.32–7.45)
PH BLDMV: 7.45 — SIGNIFICANT CHANGE UP (ref 7.32–7.45)
PHOSPHATE SERPL-MCNC: 3.5 MG/DL — SIGNIFICANT CHANGE UP (ref 2.5–4.5)
PHOSPHATE SERPL-MCNC: 4.4 MG/DL — SIGNIFICANT CHANGE UP (ref 2.5–4.5)
PLATELET # BLD AUTO: 134 K/UL — LOW (ref 150–400)
PLATELET # BLD AUTO: 136 K/UL — LOW (ref 150–400)
PLATELET # BLD AUTO: 137 K/UL — LOW (ref 150–400)
POTASSIUM SERPL-MCNC: 4.7 MMOL/L — SIGNIFICANT CHANGE UP (ref 3.5–5.3)
POTASSIUM SERPL-MCNC: 4.9 MMOL/L — SIGNIFICANT CHANGE UP (ref 3.5–5.3)
POTASSIUM SERPL-SCNC: 4.7 MMOL/L — SIGNIFICANT CHANGE UP (ref 3.5–5.3)
POTASSIUM SERPL-SCNC: 4.9 MMOL/L — SIGNIFICANT CHANGE UP (ref 3.5–5.3)
PROT SERPL-MCNC: 6.4 G/DL — SIGNIFICANT CHANGE UP (ref 6–8.3)
PROT SERPL-MCNC: 6.7 G/DL — SIGNIFICANT CHANGE UP (ref 6–8.3)
PSA FLD-MCNC: 1.73 NG/ML — SIGNIFICANT CHANGE UP (ref 0–4)
RBC # BLD: 4.44 M/UL — SIGNIFICANT CHANGE UP (ref 4.2–5.8)
RBC # BLD: 4.6 M/UL — SIGNIFICANT CHANGE UP (ref 4.2–5.8)
RBC # BLD: 4.65 M/UL — SIGNIFICANT CHANGE UP (ref 4.2–5.8)
RBC # FLD: 14 % — SIGNIFICANT CHANGE UP (ref 10.3–14.5)
RBC # FLD: 14.1 % — SIGNIFICANT CHANGE UP (ref 10.3–14.5)
RBC # FLD: 14.2 % — SIGNIFICANT CHANGE UP (ref 10.3–14.5)
RH IG SCN BLD-IMP: POSITIVE — SIGNIFICANT CHANGE UP
SAO2 % BLDMV: 38 % — LOW (ref 60–90)
SAO2 % BLDMV: 47 % — LOW (ref 60–90)
SODIUM SERPL-SCNC: 132 MMOL/L — LOW (ref 135–145)
SODIUM SERPL-SCNC: 136 MMOL/L — SIGNIFICANT CHANGE UP (ref 135–145)
T4 AB SER-ACNC: 7.4 UG/DL — SIGNIFICANT CHANGE UP (ref 4.6–12)
TIBC SERPL-MCNC: 308 UG/DL — SIGNIFICANT CHANGE UP (ref 220–430)
TSH SERPL-MCNC: 4.92 UIU/ML — HIGH (ref 0.27–4.2)
UIBC SERPL-MCNC: 255 UG/DL — SIGNIFICANT CHANGE UP (ref 110–370)
URATE SERPL-MCNC: 13.2 MG/DL — HIGH (ref 3.4–8.8)
WBC # BLD: 5.1 K/UL — SIGNIFICANT CHANGE UP (ref 3.8–10.5)
WBC # BLD: 5.2 K/UL — SIGNIFICANT CHANGE UP (ref 3.8–10.5)
WBC # BLD: 5.2 K/UL — SIGNIFICANT CHANGE UP (ref 3.8–10.5)
WBC # FLD AUTO: 5.1 K/UL — SIGNIFICANT CHANGE UP (ref 3.8–10.5)
WBC # FLD AUTO: 5.2 K/UL — SIGNIFICANT CHANGE UP (ref 3.8–10.5)
WBC # FLD AUTO: 5.2 K/UL — SIGNIFICANT CHANGE UP (ref 3.8–10.5)

## 2017-09-01 PROCEDURE — 71010: CPT | Mod: 26

## 2017-09-01 PROCEDURE — 93880 EXTRACRANIAL BILAT STUDY: CPT | Mod: 26

## 2017-09-01 PROCEDURE — 93923 UPR/LXTR ART STDY 3+ LVLS: CPT | Mod: 26

## 2017-09-01 PROCEDURE — 99223 1ST HOSP IP/OBS HIGH 75: CPT | Mod: GC

## 2017-09-01 PROCEDURE — 93010 ELECTROCARDIOGRAM REPORT: CPT

## 2017-09-01 PROCEDURE — 99233 SBSQ HOSP IP/OBS HIGH 50: CPT

## 2017-09-01 PROCEDURE — 99152 MOD SED SAME PHYS/QHP 5/>YRS: CPT | Mod: GC

## 2017-09-01 PROCEDURE — 93451 RIGHT HEART CATH: CPT | Mod: 26,GC

## 2017-09-01 RX ORDER — HYDRALAZINE HCL 50 MG
10 TABLET ORAL EVERY 8 HOURS
Qty: 0 | Refills: 0 | Status: DISCONTINUED | OUTPATIENT
Start: 2017-09-01 | End: 2017-09-01

## 2017-09-01 RX ORDER — HYDRALAZINE HCL 50 MG
10 TABLET ORAL ONCE
Qty: 0 | Refills: 0 | Status: COMPLETED | OUTPATIENT
Start: 2017-09-01 | End: 2017-09-01

## 2017-09-01 RX ORDER — FUROSEMIDE 40 MG
80 TABLET ORAL
Qty: 0 | Refills: 0 | Status: DISCONTINUED | OUTPATIENT
Start: 2017-09-01 | End: 2017-09-01

## 2017-09-01 RX ORDER — SODIUM NITROPRUSSIDE 50 MG/2ML
0.25 INJECTION INTRAVENOUS
Qty: 200 | Refills: 0 | Status: DISCONTINUED | OUTPATIENT
Start: 2017-09-01 | End: 2017-09-01

## 2017-09-01 RX ORDER — SODIUM NITROPRUSSIDE 50 MG/2ML
0.5 INJECTION INTRAVENOUS
Qty: 200 | Refills: 0 | Status: DISCONTINUED | OUTPATIENT
Start: 2017-09-01 | End: 2017-09-02

## 2017-09-01 RX ORDER — FUROSEMIDE 40 MG
6 TABLET ORAL
Qty: 500 | Refills: 0 | Status: DISCONTINUED | OUTPATIENT
Start: 2017-09-01 | End: 2017-09-01

## 2017-09-01 RX ADMIN — Medication 40 MILLIGRAM(S): at 05:52

## 2017-09-01 RX ADMIN — Medication 1 TABLET(S): at 12:14

## 2017-09-01 RX ADMIN — ATORVASTATIN CALCIUM 20 MILLIGRAM(S): 80 TABLET, FILM COATED ORAL at 22:33

## 2017-09-01 RX ADMIN — Medication 81 MILLIGRAM(S): at 12:14

## 2017-09-01 RX ADMIN — Medication 1 GRAM(S): at 02:03

## 2017-09-01 RX ADMIN — BUDESONIDE AND FORMOTEROL FUMARATE DIHYDRATE 2 PUFF(S): 160; 4.5 AEROSOL RESPIRATORY (INHALATION) at 08:46

## 2017-09-01 RX ADMIN — Medication 1 GRAM(S): at 18:04

## 2017-09-01 RX ADMIN — SODIUM NITROPRUSSIDE 2.25 MICROGRAM(S)/KG/MIN: 50 INJECTION INTRAVENOUS at 18:54

## 2017-09-01 RX ADMIN — BUDESONIDE AND FORMOTEROL FUMARATE DIHYDRATE 2 PUFF(S): 160; 4.5 AEROSOL RESPIRATORY (INHALATION) at 20:40

## 2017-09-01 RX ADMIN — HEPARIN SODIUM 5000 UNIT(S): 5000 INJECTION INTRAVENOUS; SUBCUTANEOUS at 18:03

## 2017-09-01 RX ADMIN — Medication 10 MILLIGRAM(S): at 12:13

## 2017-09-01 RX ADMIN — Medication 1 GRAM(S): at 05:53

## 2017-09-01 RX ADMIN — PANTOPRAZOLE SODIUM 40 MILLIGRAM(S): 20 TABLET, DELAYED RELEASE ORAL at 05:52

## 2017-09-01 RX ADMIN — Medication 1 GRAM(S): at 12:17

## 2017-09-01 RX ADMIN — Medication 1 GRAM(S): at 22:34

## 2017-09-01 RX ADMIN — PANTOPRAZOLE SODIUM 40 MILLIGRAM(S): 20 TABLET, DELAYED RELEASE ORAL at 18:04

## 2017-09-01 RX ADMIN — HEPARIN SODIUM 5000 UNIT(S): 5000 INJECTION INTRAVENOUS; SUBCUTANEOUS at 05:52

## 2017-09-01 NOTE — PROGRESS NOTE ADULT - ATTENDING COMMENTS
Patient was seen and examined with CCU team. I agree with findings and plan. increase diuresis and afterload meds.

## 2017-09-01 NOTE — PROGRESS NOTE ADULT - SUBJECTIVE AND OBJECTIVE BOX
Patient is a 62y old  Male who presents with a chief complaint of SOOB (25 Aug 2017 22:27)      SUBJECTIVE / OVERNIGHT EVENTS:  After a left heart catheterization yesterday, patient needed to be transferred to the CCU for chest pain and low blood pressure.  He was seen this morning in the CCU, he feels fine.  No orthopnea reported.  Review of Systems:   CONSTITUTIONAL: No fever, weight loss, or fatigue  EYES: No eye pain, visual disturbances, or discharge  ENMT:  No difficulty hearing, tinnitus, vertigo; No sinus or throat pain  NECK: No pain or stiffness  BREASTS: No pain, masses, or nipple discharge  RESPIRATORY: No cough, wheezing, chills or hemoptysis; No shortness of breath  CARDIOVASCULAR: No chest pain, palpitations, dizziness, or leg swelling  GASTROINTESTINAL: No abdominal or epigastric pain. No nausea, vomiting, or hematemesis; No diarrhea or constipation. No melena or hematochezia.  GENITOURINARY: No dysuria, frequency, hematuria, or incontinence  NEUROLOGICAL: No headaches, memory loss, loss of strength, numbness, or tremors  SKIN: No itching, burning, rashes, or lesions   LYMPH NODES: No enlarged glands  ENDOCRINE: No heat or cold intolerance; No hair loss  MUSCULOSKELETAL: No joint pain or swelling; No muscle, back, or extremity pain  PSYCHIATRIC: No depression, anxiety, mood swings, or difficulty sleeping  HEME/LYMPH: No easy bruising, or bleeding gums  ALLERY AND IMMUNOLOGIC: No hives or eczema    MEDICATIONS  (STANDING):  pantoprazole    Tablet 40 milliGRAM(s) Oral two times a day before meals  sucralfate suspension 1 Gram(s) Oral four times a day  aspirin  chewable 81 milliGRAM(s) Oral daily  atorvastatin 20 milliGRAM(s) Oral at bedtime  tiotropium 18 MICROgram(s) Capsule 1 Capsule(s) Inhalation daily  multivitamin 1 Tablet(s) Oral daily  heparin  Injectable 5000 Unit(s) SubCutaneous every 12 hours  buDESOnide 160 MICROgram(s)/formoterol 4.5 MICROgram(s) Inhaler 2 Puff(s) Inhalation two times a day  hydrALAZINE 10 milliGRAM(s) Oral every 8 hours    MEDICATIONS  (PRN):  ondansetron Injectable 4 milliGRAM(s) IV Push every 6 hours PRN Nausea and/or Vomiting      PHYSICAL EXAM:  Vital Signs Last 24 Hrs  T(C): 36.7 (01 Sep 2017 17:00), Max: 36.8 (01 Sep 2017 03:00)  T(F): 98.1 (01 Sep 2017 17:00), Max: 98.2 (01 Sep 2017 03:00)  HR: 102 (01 Sep 2017 17:00) (88 - 103)  BP: 92/71 (01 Sep 2017 17:00) (77/58 - 112/86)  BP(mean): 84 (01 Sep 2017 17:00) (63 - 94)  RR: 20 (01 Sep 2017 17:00) (8 - 30)  SpO2: 96% (01 Sep 2017 17:00) (90% - 100%)  I&O's Summary    31 Aug 2017 07:01  -  01 Sep 2017 07:00  --------------------------------------------------------  IN: 960 mL / OUT: 1000 mL / NET: -40 mL    01 Sep 2017 07:01  -  01 Sep 2017 17:44  --------------------------------------------------------  IN: 240 mL / OUT: 50 mL / NET: 190 mL      GENERAL: NAD, well-developed  HEAD:  Atraumatic, Normocephalic  EYES: EOMI, PERRLA, conjunctiva and sclera clear  NECK: Supple, No JVD  CHEST/LUNG: Clear to auscultation bilaterally; No wheeze  HEART: Regular rate and rhythm; No murmurs, rubs, or gallops  ABDOMEN: Soft, Nontender, Nondistended; Bowel sounds present  EXTREMITIES:  2+ Peripheral Pulses, No clubbing, cyanosis, or edema  PSYCH: AAOx3  NEUROLOGY: non-focal  SKIN: No rashes or lesions    LABS:  CAPILLARY BLOOD GLUCOSE                              14.9   5.2   )-----------( 134      ( 01 Sep 2017 05:18 )             45.4     09-01    136  |  96  |  35<H>  ----------------------------<  158<H>  4.7   |  25  |  1.19    Ca    9.3      01 Sep 2017 05:18  Phos  4.4     09-01  Mg     2.5     09-01    TPro  6.7  /  Alb  3.6  /  TBili  0.6  /  DBili  x   /  AST  73<H>  /  ALT  147<H>  /  AlkPhos  127<H>  09-01    PTT - ( 31 Aug 2017 10:54 )  PTT:> 200 sec  CARDIAC MARKERS ( 31 Aug 2017 18:57 )  x     / 0.02 ng/mL / 160 U/L / x     / 4.2 ng/mL  CARDIAC MARKERS ( 31 Aug 2017 06:29 )  x     / <0.01 ng/mL / 161 U/L / x     / 2.4 ng/mL          RADIOLOGY & ADDITIONAL TESTS:    Imaging Personally Reviewed:    Consultant(s) Notes Reviewed:      Care Discussed with Consultants/Other Providers:

## 2017-09-01 NOTE — CHART NOTE - NSCHARTNOTEFT_GEN_A_CORE
====================  CCU MIDNIGHT ROUNDS  ====================    RICKY JOINT  [id number]  Patient is a 62y old  Male who presents with a chief complaint of SOB (25 Aug 2017 22:27)  ====================  SUMMARY:  ====================  Patient is admitted for acute systolic HF s/p RHC with CO = 1.15 and CI = 0.63 and EF 10%.  Now with PAC, started on Nitoprusside drip.  ====================  NEW EVENTS:  ====================  Appears euvolemic.  Tolerating Nitroprusside; titrated as tolerated.  Patient refused aleman, thus discontinued.    MEDICATIONS  (STANDING):  pantoprazole    Tablet 40 milliGRAM(s) Oral two times a day before meals  sucralfate suspension 1 Gram(s) Oral four times a day  aspirin  chewable 81 milliGRAM(s) Oral daily  atorvastatin 20 milliGRAM(s) Oral at bedtime  tiotropium 18 MICROgram(s) Capsule 1 Capsule(s) Inhalation daily  multivitamin 1 Tablet(s) Oral daily  heparin  Injectable 5000 Unit(s) SubCutaneous every 12 hours  buDESOnide 160 MICROgram(s)/formoterol 4.5 MICROgram(s) Inhaler 2 Puff(s) Inhalation two times a day  nitroprusside Infusion 0.5 MICROgram(s)/kG/Min (4.5 mL/Hr) IV Continuous <Continuous>    MEDICATIONS  (PRN):  ondansetron Injectable 4 milliGRAM(s) IV Push every 6 hours PRN Nausea and/or Vomiting    ====================  VITALS (Last 12 hrs):  ====================    T(C): 36.5 (09-01-17 @ 19:30), Max: 36.7 (09-01-17 @ 17:00)  T(F): 97.7 (09-01-17 @ 19:30), Max: 98.1 (09-01-17 @ 17:00)  HR: 100 (09-01-17 @ 22:15) (94 - 106)  BP: 92/71 (09-01-17 @ 17:00) (89/70 - 100/74)  BP(mean): 84 (09-01-17 @ 17:00) (76 - 85)  ABP: 96/68 (09-01-17 @ 22:15) (96/68 - 122/92)  ABP(mean): 76 (09-01-17 @ 22:15) (76 - 102)  RR: 21 (09-01-17 @ 22:15) (8 - 30)  SpO2: 99% (09-01-17 @ 22:15) (93% - 99%)  Wt(kg): --  CVP(mm Hg): 10 (09-01-17 @ 22:15) (7 - 19)  CVP(cm H2O): --  CO: --  CI: --  PA: 45/23 (09-01-17 @ 22:15) (40/27 - 115/91)  PA(mean): 34 (09-01-17 @ 22:15) (32 - 102)  PCWP: --  SVR: --  PVR: --    I&O's Summary    31 Aug 2017 07:01  -  01 Sep 2017 07:00  --------------------------------------------------------  IN: 960 mL / OUT: 1000 mL / NET: -40 mL    01 Sep 2017 07:01  -  01 Sep 2017 22:35  --------------------------------------------------------  IN: 385.9 mL / OUT: 270 mL / NET: 115.9 mL    ====================  NEW LABS:  ====================    09-01    136  |  96  |  35<H>  ----------------------------<  158<H>  4.7   |  25  |  1.19    Ca    9.3      01 Sep 2017 05:18  Phos  4.4     09-01  Mg     2.5     09-01    TPro  x   /  Alb  x   /  TBili  x   /  DBili  0.2  /  AST  x   /  ALT  x   /  AlkPhos  x   09-01    PTT - ( 31 Aug 2017 10:54 )  PTT:> 200 sec      ABG - ( 01 Sep 2017 18:37 )  pH: 7.52  /  pCO2: 32    /  pO2: 94    / HCO3: 26    / Base Excess: 4.0   /  SaO2: 98        ====================  PLAN:  ====================  - Monitor hemodynamics  - Monitor SVO2  - Continue Nitroprusside drip and titrate as tolerated  - Monitor I & O's; bladder scan as needed  - LVAD w/u per HF  - OOB to chair as tolerated    Maria Smith CCU NP  Beeper #[id number]  Spectra # [phone]

## 2017-09-01 NOTE — PROGRESS NOTE ADULT - ATTENDING COMMENTS
Mr. Quispe is in acute on chronic systolic heart failure with severe reduction in his cardiac output and evidence of cardiogenic shock with elevated lactic acid. His abdominal pain is likely due to his low output state. He has a nonischemic dilated cardiomyopathy notable for severe mitral regurgitation. He is not significantly volume overloaded, but his bilateral filling pressures are significant elevated.     I spoke extensively with Mr. Quispe and with his neice, with whom he prefers to communicate. She explained to him the gravity of his illness and the fact that he needs urgent evaluation for advanced therapies, such as LVAD and transplant. He is willing at the moment to undergo this evaluation. We stress to him the need for using the  phone.     For now, we will trial nitroprusside. If this is not successful, he may need inotropic or temporary mechanical circulatory support.

## 2017-09-01 NOTE — PROGRESS NOTE ADULT - ASSESSMENT
62M from [facility],  smoker, presented with abdominal pain, narrow pulse pressure (95/80), cachexia, severe JVD,  ?CKD (cr 1.2, C-G equation GFR=55), cMRI (EF 16.5%, no LV thrombus, anteroseptal mid myocardial LGE), LHC with no obstruction, RHC with CVP 16, PA 54/27/37, [place] CO/CI = 1.15/0.63.

## 2017-09-01 NOTE — PROGRESS NOTE ADULT - SUBJECTIVE AND OBJECTIVE BOX
Patient is a 62y old  Male who presents with a chief complaint of SOB (25 Aug 2017 22:27)    HPI:    Patient is a 62 years old Creole speaking Male with no no significant PMH who presented 6 days ago with SOB and chest pain. Patient states that he had no hx of heart failure or MI. He was vacationing in [facility] for the past month and his symptoms began a week prior to admission. He endorses chest pain which was substernal, non radiating and worse with exertion along with non productive cough and PND. No orthopnea or weight changes. He also endorses anorexia and weight loss. No recent sickness per se and no known sick contacts. He sought medical attention in [facility] but refused to be admitted to the hospital there. [place] the [place] and came to [facility] ED. Patient found to be tachycardiac and hypotensive.     During his hospital stay, patient had had a negative CTA but a newly diagnosed systolic CHF with EF 10-15% and severely dilated LA, Severe MR, TR. Patient has been on IV lasix with less than ideal urinary output. RHC done today showed PA systolic pressure of 54, LVEDP 32, CO 1.15, CI 0.63 and EF 10%. Admitted to CCU for close monitoring and diuresis. Cardiac MRI negative for LV thrombus      Overnight Events:    Afebrile. No CP or SOB. Lying flat with no orthopnea.     MEDICATIONS  (STANDING):  pantoprazole    Tablet 40 milliGRAM(s) Oral two times a day before meals  sucralfate suspension 1 Gram(s) Oral four times a day  furosemide   Injectable 40 milliGRAM(s) IV Push two times a day  aspirin  chewable 81 milliGRAM(s) Oral daily  atorvastatin 20 milliGRAM(s) Oral at bedtime  tiotropium 18 MICROgram(s) Capsule 1 Capsule(s) Inhalation daily  multivitamin 1 Tablet(s) Oral daily  heparin  Injectable 5000 Unit(s) SubCutaneous every 12 hours  buDESOnide 160 MICROgram(s)/formoterol 4.5 MICROgram(s) Inhaler 2 Puff(s) Inhalation two times a day    MEDICATIONS  (PRN):  ondansetron Injectable 4 milliGRAM(s) IV Push every 6 hours PRN Nausea and/or Vomiting    ICU Vital Signs Last 24 Hrs  T(C): 36.4 (01 Sep 2017 06:00), Max: 36.8 (01 Sep 2017 03:00)  T(F): 97.6 (01 Sep 2017 06:00), Max: 98.2 (01 Sep 2017 03:00)  HR: 96 (01 Sep 2017 06:00) (88 - 100)  BP: 87/60 (01 Sep 2017 06:00) (77/58 - 112/86)  BP(mean): 74 (01 Sep 2017 06:00) (63 - 94)  ABP: --  ABP(mean): --  RR: 13 (01 Sep 2017 06:00) (10 - 30)  SpO2: 96% (01 Sep 2017 06:00) (93% - 100%)    I&O's Summary    30 Aug 2017 07:01  -  31 Aug 2017 07:00  --------------------------------------------------------  IN: 1003 mL / OUT: 1600 mL / NET: -597 mL    31 Aug 2017 07:01  -  31 Aug 2017 18:38  --------------------------------------------------------  IN: 540 mL / OUT: 400 mL / NET: 140 mL      PHYSICAL EXAM:  GENERAL: NAD, well-developed  HEAD:  Atraumatic, Normocephalic  EYES: EOMI, PERRLA, conjunctiva and sclera clear  NECK: Supple, + JVD~10 seconds  CHEST/LUNG: clear lungs. No wheezing     HEART: Tachycardic to 100s. Regular rhythm; No murmurs   ABDOMEN: Soft, Nontender, Nondistended; Bowel sounds present  EXTREMITIES:  2+ Peripheral Pulses, No clubbing, cyanosis, or edema--B/L Feet extremely cool to touch   PSYCH: AAOx3  NEUROLOGY: non-focal  SKIN: No rashes or lesions    LABS:                        14.9   5.0   )-----------( 154      ( 31 Aug 2017 10:54 )             45.5     08-31    136  |  97  |  33<H>  ----------------------------<  139<H>  4.3   |  23  |  1.08    Ca    9.2      31 Aug 2017 06:29  Phos  3.2     08-29  Mg     2.0     08-29      PTT - ( 31 Aug 2017 10:54 )  PTT:> 200 sec  CARDIAC MARKERS ( 31 Aug 2017 06:29 )  x     / <0.01 ng/mL / 161 U/L / x     / 2.4 ng/mL    < from: TTE with Doppler (w/Cont) (08.28.17 @ 22:35) >      < end of copied text >          < from: TTE with Doppler (w/Cont) (08.28.17 @ 22:35) >    Dimensions:    Normal Values:  LA:     4.2    2.0 - 4.0 cm  Ao:     3.3    2.0 - 3.8 cm  SEPTUM: 0.7    0.6 - 1.2 cm  PWT:    0.8    0.6 - 1.1 cm  LVIDd:  6.7    3.0 - 5.6 cm  LVIDs:  6.4    1.8 - 4.0 cm  Derived variables:  LVMI: 115 g/m2  RWT: 0.23  Fractional short: 4 %  EF (Visual Estimate): 10-15 %  ------------------------------------------------------------------------  Observations:  Mitral Valve: Thickened mitral valve. Dilated mitral valve  annulus. Tethered posterior leaflet.  Severe, eccentric,  posteriorly-directed mitral regurgitation. Flow reversal is  seen in the pulmonary veins.  Aortic Valve/Aorta: Thickened aortic valve.  Aortic Root: 3.3 cm.  Left Atrium: Severely dilatedleft atrium.  LA volume index  = 63 cc/m2.  Left Ventricle: Endocardial visualization enhanced with  intravenous injection of echo contrast (Definity). Severe  global left ventricular systolic dysfunction with regional  variation (the mid to distal anterior wall and septum are  akinetic). Spontaneous echocardiographic contrast is seen  in the left ventricle. Ill-defined mass at the apex of the  left ventricle. May be left ventricular trabeculation or  thrombus. Consider MRI to further evaluate. Moderate to  severe left ventricular enlargement. E/e' equals 20,  consistent with elevated left ventricular end diastolic  pressure.  Right Heart: Moderate right atrial enlargement. Right  ventricular enlargement with decreased right ventricular  systolic function.  Diastolic septal flattening consistent  with right ventricular volume overload.  Thickened  tricuspid valve. Moderate-severe tricuspid regurgitation.  Normal pulmonic valve. Mild pulmonic regurgitation.  Pericardium/Pleura: Trace pericardial effusion.  Bilateral pleural effusions.  ------------------------------------------------------------------------  Conclusions:  1. Thickened mitral valve. Dilated mitral valve annulus.  Tethered posterior leaflet.  Severe, eccentric,  posteriorly-directed mitral regurgitation. Flow reversal is  seen in the pulmonary veins.  2. Thickened aortic valve.  3. Severely dilated left atrium.  4. Moderate to severe left ventricular enlargement.  5. Endocardial visualization enhanced with intravenous  injection of echo contrast (Definity). Severe global left  ventricular systolic dysfunction with regional variation  (the mid to distal anterior wall and septum are akinetic).  Spontaneous echocardiographic contrast is seen in the left  ventricle. Ill-defined, mass at the apex of the left  ventricle. May be left ventricular trabeculation or  thrombus. Consider MRI to further evaluate.  6. E/e' equals 20, consistent with elevated left  ventricular end diastolic pressure.  7. Moderate right atrial enlargement.  8. Right ventricular enlargement with decreased right  ventricular systolic function.  Diastolic septal flattening  consistent with right ventricular volume overload.  9. Thickened tricuspid valve. Moderate-severe tricuspid  regurgitation.  10. Trace pericardial effusion.  ------------------------------------------------------------------------  Confirmed on  8/28/2017 - 15:54:57 by Matias Stubbs M.D.    < end of copied text >      RADIOLOGY & ADDITIONAL TESTS:    Imaging Personally Reviewed:    Consultant(s) Notes Reviewed:      < from: MRI Cardiac w/wo Cont (08.30.17 @ 14:56) >    INDICATION: Possible left ventricular thrombus or trabeculation.    COMPARISON: CTA 8/25/2017..    TECHNIQUE: Multi-sequential, multi-planar cardiac MRI was performed   before and after the intravenous administration of 100 mL of Gadavist; 0   mL was discarded. Resting myocardial perfusion imaging was performed.   Delayed viability imaging was performed. The indexed values were   calculated based on a BSA of 1.82 m2.    SCANNER: [employer] Squared 1.5 T [employer] platform using a cardiac coil.    QUALITY: Fair.      FINDINGS:      MORPHOLOGY:    RIGHT ATRIUM: The right atrium is qualitatively enlarged.     RIGHT VENTRICLE: The right ventricle is qualitatively enlarged.    LEFT ATRIUM: The left atrium is enlarged. The left atrium measures 4.2 cm   in the 3 chamber plane.    LEFT VENTRICLE: The left ventricle is dilated the left ventricle measures   7.7 cm in the short axis (anteroseptal/inferolateral). The late   gadolinium enhanced images were reduced in quality secondary to shallow   respiratory breathing artifact. However, there is likely subtle, late   gadolinium enhancement along the anteroseptal wall.      FUNCTION: The left ventricle is globally hypokinetic.     LEFT VENTRICLE:  Unindexed:  EF: 16.85 %  EDV: 330.67 mL  ESV: 274.97 mL  SV: 55.70 mL  CO: 5.91 L/min    Indexed:  EDVi: 183.05 mL/m2  ESVi: 152.21 mL/m2  SV: 30.84 mL/m2  CO: 3.27 L/min/m2      VALVES:  PULMONIC VALVE: Pulmonic insufficiency.  TRICUSPID VALVE: Tricuspid insufficiency.  MITRAL VALVE: Mitral regurgitation.  AORTIC VALVE: Unremarkable.      AORTA: Three-vessel left-sided aortic arch and left-sided descending   thoracic aorta.      NONCARDIAC FINDINGS: Bilateral pleural effusions. Bilateral atelectasis.   The findings suspicious for edema or better shown on the recent CT.      IMPRESSION:    1.  Technically difficult exam secondary to respiratory motion artifact.  2.  Decreased left ventricular ejection fraction with a calculated   ejection fraction 16.5%.  3.  No convincing findings to suggest ventricular thrombus.  4.  Likely mid myocardial focus of late gadolinium enhancement it is   nonspecific butcan occur in the clinical setting of left ventricular   heart failure.  5.  Mitral valve and tricuspid valve insufficiency.                    PAULINO GREGORY M.D., ATTENDING RADIOLOGIST  This document has been electronically signed. Aug 30 2017  5:33PM    < end of copied text >  Care Discussed with Consultants/Other Providers:

## 2017-09-01 NOTE — PROGRESS NOTE ADULT - SUBJECTIVE AND OBJECTIVE BOX
HISTORY OF PRESENT ILLNESS:  62yoM , smoker ,lives in [place], no prior cardiac issues, presented to   for abdominal pain, cardiac workup included echo, cMRI, LHC and RHC. Now in CCU for HFrEF. Advanced HF team consulted for mgmt.     At bedside, patient sitting in bed . Complains of abd pain.   Denies chest pain, dyspnea, dizziness, syncope, presyncope, orthopnea, edema, PND.         Allergies    No Known Allergies    Intolerances    	    MEDICATIONS:  furosemide   Injectable 40 milliGRAM(s) IV Push two times a day    aspirin  chewable 81 milliGRAM(s) Oral daily  heparin  Injectable 5000 Unit(s) SubCutaneous every 12 hours  tiotropium 18 MICROgram(s) Capsule 1 Capsule(s) Inhalation daily  buDESOnide 160 MICROgram(s)/formoterol 4.5 MICROgram(s) Inhaler 2 Puff(s) Inhalation two times a day  ondansetron Injectable 4 milliGRAM(s) IV Push every 6 hours PRN  pantoprazole    Tablet 40 milliGRAM(s) Oral two times a day before meals  sucralfate suspension 1 Gram(s) Oral four times a day  atorvastatin 20 milliGRAM(s) Oral at bedtime  multivitamin 1 Tablet(s) Oral daily      PAST MEDICAL & SURGICAL HISTORY:  No pertinent past medical history  No significant past surgical history      FAMILY HISTORY:  No pertinent family history in first degree relatives      SOCIAL HISTORY:    [ ] Non-smoker  [former ] Smoker  [ ] Alcohol      REVIEW OF SYSTEMS:  General: no fatigue/malaise, weight loss/gain.  Skin: no rashes.  ENT: no cold symptoms  Respiratory: no SOB, cough or wheeze.  Gastrointestinal:  ++ ABD pain  Musculoskeletal: no myalgias or arthralgias.  Neurological: no syncope  Psychiatric: no unusual stress/anxiety.   All others negative except as stated above and in HPI.    PHYSICAL EXAM:  T(C): 36.4 (17 @ 09:00), Max: 36.8 (17 @ 03:00)  HR: 102 (17 @ 09:00) (88 - 102)  BP: 94/81 (17 @ 09:00) (77/58 - 112/86)  RR: 16 (17 @ 09:00) (10 - 30)  SpO2: 94% (17 @ 08:00) (90% - 100%)  Wt(kg): --  I&O's Summary    31 Aug 2017 07:  -  01 Sep 2017 07:00  --------------------------------------------------------  IN: 960 mL / OUT: 1000 mL / NET: -40 mL    01 Sep 2017 07:  -  01 Sep 2017 10:44  --------------------------------------------------------  IN: 0 mL / OUT: 50 mL / NET: -50 mL        Appearance: Normal	  HEENT:   Normal oral mucosa,  Lymphatic: No lymphadenopathy  Cardiovascular: soft S1 S2, laterally displaced large PMI, severe JVD to ear while sitting at 45 degrees.   Respiratory: RLQ crackles  Psychiatry: Awake and alert  Gastrointestinal:  Soft, Non-tender, + BS	  Skin: No rashes, No ecchymoses, No cyanosis	  Neurologic: Non-focal  Extremities: Normal range of motion, No clubbing, cyanosis or edema  Vascular: Peripheral pulses palpable 2+ bilaterally        LABS:	 	    CBC Full  -  ( 01 Sep 2017 05:18 )  WBC Count : 5.2 K/uL  Hemoglobin : 14.9 g/dL  Hematocrit : 45.4 %  Platelet Count - Automated : 134 K/uL  Mean Cell Volume : 98.7 fl  Mean Cell Hemoglobin : 32.3 pg  Mean Cell Hemoglobin Concentration : 32.8 gm/dL  Auto Neutrophil # : 2.7 K/uL  Auto Lymphocyte # : 1.8 K/uL  Auto Monocyte # : 0.6 K/uL  Auto Eosinophil # : 0.1 K/uL  Auto Basophil # : 0.1 K/uL  Auto Neutrophil % : 51.2 %  Auto Lymphocyte % : 34.1 %  Auto Monocyte % : 11.3 %  Auto Eosinophil % : 1.6 %  Auto Basophil % : 1.7 %        136  |  96  |  35<H>  ----------------------------<  158<H>  4.7   |  25  |  1.19  08-31    139  |  100  |  33<H>  ----------------------------<  130<H>  3.9   |  24  |  1.06    Ca    9.3      01 Sep 2017 05:18  Ca    8.4      31 Aug 2017 18:57  Phos  4.4       Phos  3.2       Mg     2.5       Mg     2.3         TPro  6.7  /  Alb  3.6  /  TBili  0.6  /  DBili  x   /  AST  73<H>  /  ALT  147<H>  /  AlkPhos  127<H>    TPro  6.1  /  Alb  3.3  /  TBili  0.4  /  DBili  x   /  AST  72<H>  /  ALT  147<H>  /  AlkPhos  121<H>        proBNP:   Lipid Profile:   HgA1c: Hemoglobin A1C, Whole Blood: 6.2 % ( @ 00:08)    < from: TTE with Doppler (w/Cont) (17 @ 22:35) >    Patient name: RICKY HAMPTON  YOB: 1955   Age: 62 (M)   MR#: [id number]  Study Date: 2017  Location: [address] [place], [place] [id number]: Rolando Galan RDCS  Study quality: Technically fair  Referring Physician: Lakshmi Segovia MD  Blood Pressure: 105/77 mmHg  Height: 178 cm  Weight: 66 kg  BSA: 1.8 m2  ------------------------------------------------------------------------  PROCEDURE: Transthoracic echocardiogram with 2-D, M-Mode  and complete spectral and color flow Doppler. Verbal  consent was obtained for injection of echo contrast  following a discussion of risks and benefits. Following  intravenous injection of contrast, harmonic imaging was  performed.  INDICATION: Chest pain, unspecified (R07.9)  ------------------------------------------------------------------------  Dimensions:    Normal Values:  LA:     4.2    2.0 - 4.0 cm  Ao:     3.3    2.0 - 3.8 cm  SEPTUM: 0.7    0.6 - 1.2 cm  PWT:    0.8    0.6 - 1.1 cm  LVIDd:  6.7    3.0 - 5.6 cm  LVIDs:  6.4    1.8 - 4.0 cm  Derived variables:  LVMI: 115 g/m2  RWT: 0.23  Fractional short: 4 %  EF (Visual Estimate): 10-15 %  ------------------------------------------------------------------------  Observations:  Mitral Valve: Thickened mitral valve. Dilated mitral valve  annulus. Tethered posterior leaflet.  Severe, eccentric,  posteriorly-directed mitral regurgitation. Flow reversal is  seen in the pulmonary veins.  Aortic Valve/Aorta: Thickened aortic valve.  Aortic Root: 3.3 cm.  Left Atrium: Severely dilatedleft atrium.  LA volume index  = 63 cc/m2.  Left Ventricle: Endocardial visualization enhanced with  intravenous injection of echo contrast (Definity). Severe  global left ventricular systolic dysfunction with regional  variation (the mid to distal anterior wall and septum are  akinetic). Spontaneous echocardiographic contrast is seen  in the left ventricle. Ill-defined mass at the apex of the  left ventricle. May be left ventricular trabeculation or  thrombus. Consider MRI to further evaluate. Moderate to  severe left ventricular enlargement. E/e' equals 20,  consistent with elevated left ventricular end diastolic  pressure.  Right Heart: Moderate right atrial enlargement. Right  ventricular enlargement with decreased right ventricular  systolic function.  Diastolic septal flattening consistent  with right ventricular volume overload.  Thickened  tricuspid valve. Moderate-severe tricuspid regurgitation.  Normal pulmonic valve. Mild pulmonic regurgitation.  Pericardium/Pleura: Trace pericardial effusion.  Bilateral pleural effusions.  ------------------------------------------------------------------------  Conclusions:  1. Thickened mitral valve. Dilated mitral valve annulus.  Tethered posterior leaflet.  Severe, eccentric,  posteriorly-directed mitral regurgitation. Flow reversal is  seen in the pulmonary veins.  2. Thickened aortic valve.  3. Severely dilated left atrium.  4. Moderate to severe left ventricular enlargement.  5. Endocardial visualization enhanced with intravenous  injection of echo contrast (Definity). Severe global left  ventricular systolic dysfunction with regional variation  (the mid to distal anterior wall and septum are akinetic).  Spontaneous echocardiographic contrast is seen in the left  ventricle. Ill-defined, mass at the apex of the left  ventricle. May be left ventricular trabeculation or  thrombus. Consider MRI to further evaluate.  6. E/e' equals 20, consistent with elevated left  ventricular end diastolic pressure.  7. Moderate right atrial enlargement.  8. Right ventricular enlargement with decreased right  ventricular systolic function.  Diastolic septal flattening  consistent with right ventricular volume overload.  9. Thickened tricuspid valve. Moderate-severe tricuspid  regurgitation.  10. Trace pericardial effusion.  ------------------------------------------------------------------------  Confirmed on  2017 - 15:54:57 by Matias Stubbs M.D.  ------------------------------------------------------------------------    < end of copied text >  < from: Cardiac Cath Lab - Adult (17 @ 15:17) >    [facility]  Department of Cardiology  [address] [place] [address] [phone]  Cath Lab Report -- Comprehensive Report  Patient: RICKY HAMPTON  Study date: 2017  Account number: [id number]  MR number: [id number]  : 1955  Gender: Male  Race:  Amer  Case Physician(s):  Tae Hernandez M.D.  Fellow:  Fredi Howard M.D.  Referring Physician:  MD Lakshmi Bowden M.D.  INDICATIONS: Acute systolic congestive heart failure.  HISTORY: History includes nonischemic cardiomyopathy. The patient has  hypertension.  PROCEDURE:  --  Right heart catheterization.  --  Left heart catheterization.  --  Left coronary angiography.  --  Right coronary angiography.  --  Sonosite - Diagnostic.  TECHNIQUE: The risks and alternatives of the procedures and conscious  sedation were explained to the patient and informed consent was obtained.  Cardiac catheterization performed electively.  Local anesthetic given. Right femoral artery access. Right femoral vein  access. Right heart catheterization. The procedure was performed utilizing  a catheter. Left heart catheterization. Left coronary artery angiography.  The vessel was injected utilizing a catheter. Right coronary artery  angiography. The vessel was injected utilizing a catheter. Sonosite -  Diagnostic. RADIATION EXPOSURE: 10.4 min.  CONTRAST GIVEN: Omnipaque 18 ml.  CORONARY VESSELS: The coronary circulation is right dominant.  LM:   --  LM: Normal.  LAD:   --  LAD: Normal.  CX:  --  Circumflex: Normal.  RCA:   --  RCA: Normal.  COMPLICATIONS: There were no complications.  DIAGNOSTIC RECOMMENDATIONS: Pts LVEDP is 40 so will admit to CCU  INTERVENTIONAL RECOMMENDATIONS: Pts LVEDP is 40 so will admit to CCU  Prepared and signed by  Tae Hernandez M.D.  Signed 2017 08:45:48  HEMODYNAMIC TABLES  Pressures:  Baseline  Pressures:  - HR: 100  Pressures:  - Rhythm:  Pressures:  -- Aortic Pressure (S/D/M): --/--/76  Pressures:  -- Left Ventricle (s/edp): 88/32/--  Pressures:  -- Pulmonary Artery (S/D/M): 54/27/37  Pressures:  -- Right Atrium (a/v/M): 20/18/16  Pressures:  -- Right Ventricle (s/edp): 49/21/--  O2 Sats:  Baseline  O2 Sats:  - HR: 100  O2 Sats:  - Rhythm:  O2 Sats:  -- AO: 13.6/92.2/17.05  O2 Sats:  -- PA:13.6/30.1/5.57  Outputs:  Baseline  Outputs:  -- CALCULATIONS[age]  Outputs:  -- CALCULATIONS: Body Surface Area: 1.82  Outputs:  -- CALCULATIONS: Height in cm: 177.00  Outputs:  -- CALCULATIONS: Sex: Male  Outputs:  -- CALCULATIONS: Weight in k.00  Outputs:  -- OUTPUTS: Blood Oxygen Difference: 11.49  Outputs:  -- OUTPUTS: CO by Kemar: 1.15  Outputs:  -- OUTPUTS: Kemar cardiac index: 0.63  Outputs:  -- OUTPUTS: O2 consumption: 132.18  Outputs:  -- OUTPUTS: Vo2 Indexed: 72.73  Outputs:  -- RESISTANCES: Right ventricular stroke work: 3.39  Outputs:  -- RESISTANCES: Right ventricular stroke work index: 1.86  Outputs:  -- RESISTANCES: Systemic vascular index (dsc): 7578.61  Outputs:  -- RESISTANCES: Systemic vascular index (Wood Units): 94.76  Outputs:  -- RESISTANCES: Systemic vascular resistance (dsc): 4170.00  Outputs:  -- RESISTANCES: Systemic vascular resistance (Wood Units): 52.14  Outputs:  -- RESISTANCES: Total pulmonary index (dsc): 4673.48  Outputs:  -- RESISTANCES: Total pulmonary index (Wood Units): 58.43  Outputs:  -- RESISTANCES: Total pulmonary resistance (dsc): 2571.50  Outputs:  -- RESISTANCES: Total pulmonary resistance (Wood Units): 32.15  Outputs:  -- RESISTANCES: Total vascular index (Wood Units): 120.02  Outputs:  -- RESISTANCES: Total vascular resistance (dsc): 5282.00  Outputs:  -- RESISTANCES: Total vascular resistance (Wood Units): 66.04  Outputs:  -- RESISTANCES: Total vascular resistance index (dsc): 9599.57  Outputs:  -- RESISTANCES: TPR_TVR Ratio: 0.49  Outputs:  -- SHUNTS: Qs Indexed: 0.63  Outputs:  -- SHUNTS: Systemic flow: 1.15    < end of copied text >    < from: MRI Cardiac w/wo Cont (17 @ 14:56) >    EXAM:  MRI CARDIAC W WO C                            PROCEDURE DATE:  2017            INTERPRETATION:  MRI CARDIAC WITHOUT AND WITH CONTRAST      INDICATION: Possible left ventricular thrombus or trabeculation.    COMPARISON: CTA 2017..    TECHNIQUE: Multi-sequential, multi-planar cardiac MRI was performed   before and after the intravenous administration of 100 mL of Gadavist; 0   mL was discarded. Resting myocardial perfusion imaging was performed.   Delayed viability imaging was performed. The indexed values were   calculated based on a BSA of 1.82 m2.    SCANNER: [employer] 1.5 T Numerifye platform using a cardiac coil.    QUALITY: Fair.      FINDINGS:      MORPHOLOGY:    RIGHT ATRIUM: The right atrium is qualitatively enlarged.     RIGHT VENTRICLE: The right ventricle is qualitatively enlarged.    LEFT ATRIUM: The left atrium is enlarged. The left atrium measures 4.2 cm   in the 3 chamber plane.    LEFT VENTRICLE: The left ventricle is dilated the left ventricle measures   7.7 cm in the short axis (anteroseptal/inferolateral). The late   gadolinium enhanced images were reduced in quality secondary to shallow   respiratory breathing artifact. However, there is likely subtle, late   gadolinium enhancement along the anteroseptal wall.      FUNCTION: The left ventricle is globally hypokinetic.     LEFT VENTRICLE:  Unindexed:  EF: 16.85 %  EDV: 330.67 mL  ESV: 274.97 mL  SV: 55.70 mL  CO: 5.91 L/min    Indexed:  EDVi: 183.05 mL/m2  ESVi: 152.21 mL/m2  SV: 30.84 mL/m2  CO: 3.27 L/min/m2      VALVES:  PULMONIC VALVE: Pulmonic insufficiency.  TRICUSPID VALVE: Tricuspid insufficiency.  MITRAL VALVE: Mitral regurgitation.  AORTIC VALVE: Unremarkable.      AORTA: Three-vessel left-sided aortic arch and left-sided descending   thoracic aorta.      NONCARDIAC FINDINGS: Bilateral pleural effusions. Bilateral atelectasis.   The findings suspicious for edema or better shown on the recent CT.      IMPRESSION:    1.  Technically difficult exam secondary to respiratory motion artifact.  2.  Decreased left ventricular ejection fraction with a calculated   ejection fraction 16.5%.  3.  No convincing findings to suggest ventricular thrombus.  4.  Likely mid myocardial focus of late gadolinium enhancement it is   nonspecific butcan occur in the clinical setting of left ventricular   heart failure.  5.  Mitral valve and tricuspid valve insufficiency.                    PAULINO GREGORY M.D., ATTENDING RADIOLOGIST  This document has been electronically signed. Aug 30 2017  5:33PM    < end of copied text >      TELEMETRY: 	  In the last 24 hours, there was normal sinus rhythm on telemetry, the heart rate varies between . There was some ventricular ectopy.     ECG:  	  SR , LVH, LAD, LAE HISTORY OF PRESENT ILLNESS:  61yo M , smoker ,lives in [place], no prior cardiac issues, presented to   for abdominal pain, cardiac workup included echo, cMRI, LHC and RHC. Now in CCU for HFrEF. Advanced HF team consulted for mgmt.     At bedside, patient sitting in bed . Complains of abd pain. He notes that he can walk only a block and is limited by dyspnea. Denies chest pain, dyspnea, dizziness, syncope, presyncope, orthopnea, edema, PND.     Allergies    No Known Allergies    Intolerances      MEDICATIONS:  furosemide   Injectable 40 milliGRAM(s) IV Push two times a day    aspirin  chewable 81 milliGRAM(s) Oral daily  heparin  Injectable 5000 Unit(s) SubCutaneous every 12 hours  tiotropium 18 MICROgram(s) Capsule 1 Capsule(s) Inhalation daily  buDESOnide 160 MICROgram(s)/formoterol 4.5 MICROgram(s) Inhaler 2 Puff(s) Inhalation two times a day  ondansetron Injectable 4 milliGRAM(s) IV Push every 6 hours PRN  pantoprazole    Tablet 40 milliGRAM(s) Oral two times a day before meals  sucralfate suspension 1 Gram(s) Oral four times a day  atorvastatin 20 milliGRAM(s) Oral at bedtime  multivitamin 1 Tablet(s) Oral daily      PAST MEDICAL & SURGICAL HISTORY:  No pertinent past medical history  No significant past surgical history      FAMILY HISTORY:  No pertinent family history in first degree relatives      SOCIAL HISTORY:  He smokes cigarettes. He works in clothing. He has lived in [place] since  and prior to that lived in [place], where his son currently lives. He is originally from [facility].     REVIEW OF SYSTEMS:  General: no fatigue/malaise, weight loss/gain.  Skin: no rashes.  ENT: no cold symptoms  Respiratory: no SOB, cough or wheeze.  Gastrointestinal:  ++ ABD pain  Musculoskeletal: no myalgias or arthralgias.  Neurological: no syncope  Psychiatric: no unusual stress/anxiety.   All others negative except as stated above and in HPI.    PHYSICAL EXAM:  T(C): 36.4 (17 @ 09:00), Max: 36.8 (17 @ 03:00)  HR: 102 (17 @ 09:00) (88 - 102)  BP: 94/81 (17 @ 09:00) (77/58 - 112/86)  RR: 16 (17 @ 09:00) (10 - 30)  SpO2: 94% (17 @ 08:00) (90% - 100%)  Wt(kg): --  I&O's Summary    31 Aug 2017 07:  -  01 Sep 2017 07:00  --------------------------------------------------------  IN: 960 mL / OUT: 1000 mL / NET: -40 mL    01 Sep 2017 07:  -  01 Sep 2017 10:44  --------------------------------------------------------  IN: 0 mL / OUT: 50 mL / NET: -50 mL        Appearance: Normal	  HEENT:   Normal oral mucosa,  Lymphatic: No lymphadenopathy  Cardiovascular: soft S1 S2, laterally displaced large PMI, severe JVD to ear while sitting at 45 degrees.   Respiratory: RLQ crackles  Psychiatry: Awake and alert  Gastrointestinal:  Soft, Non-tender, + BS	  Skin: No rashes, No ecchymoses, No cyanosis	  Neurologic: Non-focal  Extremities: Normal range of motion, No clubbing, cyanosis or edema  Vascular: Peripheral pulses palpable 2+ bilaterally        LABS:	 	    CBC Full  -  ( 01 Sep 2017 05:18 )  WBC Count : 5.2 K/uL  Hemoglobin : 14.9 g/dL  Hematocrit : 45.4 %  Platelet Count - Automated : 134 K/uL  Mean Cell Volume : 98.7 fl  Mean Cell Hemoglobin : 32.3 pg  Mean Cell Hemoglobin Concentration : 32.8 gm/dL  Auto Neutrophil # : 2.7 K/uL  Auto Lymphocyte # : 1.8 K/uL  Auto Monocyte # : 0.6 K/uL  Auto Eosinophil # : 0.1 K/uL  Auto Basophil # : 0.1 K/uL  Auto Neutrophil % : 51.2 %  Auto Lymphocyte % : 34.1 %  Auto Monocyte % : 11.3 %  Auto Eosinophil % : 1.6 %  Auto Basophil % : 1.7 %        136  |  96  |  35<H>  ----------------------------<  158<H>  4.7   |  25  |  1.19      139  |  100  |  33<H>  ----------------------------<  130<H>  3.9   |  24  |  1.06    Ca    9.3      01 Sep 2017 05:18  Ca    8.4      31 Aug 2017 18:57  Phos  4.4       Phos  3.2       Mg     2.5       Mg     2.3         TPro  6.7  /  Alb  3.6  /  TBili  0.6  /  DBili  x   /  AST  73<H>  /  ALT  147<H>  /  AlkPhos  127<H>    TPro  6.1  /  Alb  3.3  /  TBili  0.4  /  DBili  x   /  AST  72<H>  /  ALT  147<H>  /  AlkPhos  121<H>        proBNP:   Lipid Profile:   HgA1c: Hemoglobin A1C, Whole Blood: 6.2 % ( @ 00:08)    < from: TTE with Doppler (w/Cont) (17 @ 22:35) >    Patient name: RICKY HAMPTON  YOB: 1955   Age: 62 (M)   MR#: [id number]  Study Date: 2017  Location: [address] [place], [place] [id number]: Rolando Galan RDCS  Study quality: Technically fair  Referring Physician: Lakshmi Segovia MD  Blood Pressure: 105/77 mmHg  Height: 178 cm  Weight: 66 kg  BSA: 1.8 m2  ------------------------------------------------------------------------  PROCEDURE: Transthoracic echocardiogram with 2-D, M-Mode  and complete spectral and color flow Doppler. Verbal  consent was obtained for injection of echo contrast  following a discussion of risks and benefits. Following  intravenous injection of contrast, harmonic imaging was  performed.  INDICATION: Chest pain, unspecified (R07.9)  ------------------------------------------------------------------------  Dimensions:    Normal Values:  LA:     4.2    2.0 - 4.0 cm  Ao:     3.3    2.0 - 3.8 cm  SEPTUM: 0.7    0.6 - 1.2 cm  PWT:    0.8    0.6 - 1.1 cm  LVIDd:  6.7    3.0 - 5.6 cm  LVIDs:  6.4    1.8 - 4.0 cm  Derived variables:  LVMI: 115 g/m2  RWT: 0.23  Fractional short: 4 %  EF (Visual Estimate): 10-15 %  ------------------------------------------------------------------------  Observations:  Mitral Valve: Thickened mitral valve. Dilated mitral valve  annulus. Tethered posterior leaflet.  Severe, eccentric,  posteriorly-directed mitral regurgitation. Flow reversal is  seen in the pulmonary veins.  Aortic Valve/Aorta: Thickened aortic valve.  Aortic Root: 3.3 cm.  Left Atrium: Severely dilatedleft atrium.  LA volume index  = 63 cc/m2.  Left Ventricle: Endocardial visualization enhanced with  intravenous injection of echo contrast (Definity). Severe  global left ventricular systolic dysfunction with regional  variation (the mid to distal anterior wall and septum are  akinetic). Spontaneous echocardiographic contrast is seen  in the left ventricle. Ill-defined mass at the apex of the  left ventricle. May be left ventricular trabeculation or  thrombus. Consider MRI to further evaluate. Moderate to  severe left ventricular enlargement. E/e' equals 20,  consistent with elevated left ventricular end diastolic  pressure.  Right Heart: Moderate right atrial enlargement. Right  ventricular enlargement with decreased right ventricular  systolic function.  Diastolic septal flattening consistent  with right ventricular volume overload.  Thickened  tricuspid valve. Moderate-severe tricuspid regurgitation.  Normal pulmonic valve. Mild pulmonic regurgitation.  Pericardium/Pleura: Trace pericardial effusion.  Bilateral pleural effusions.  ------------------------------------------------------------------------  Conclusions:  1. Thickened mitral valve. Dilated mitral valve annulus.  Tethered posterior leaflet.  Severe, eccentric,  posteriorly-directed mitral regurgitation. Flow reversal is  seen in the pulmonary veins.  2. Thickened aortic valve.  3. Severely dilated left atrium.  4. Moderate to severe left ventricular enlargement.  5. Endocardial visualization enhanced with intravenous  injection of echo contrast (Definity). Severe global left  ventricular systolic dysfunction with regional variation  (the mid to distal anterior wall and septum are akinetic).  Spontaneous echocardiographic contrast is seen in the left  ventricle. Ill-defined, mass at the apex of the left  ventricle. May be left ventricular trabeculation or  thrombus. Consider MRI to further evaluate.  6. E/e' equals 20, consistent with elevated left  ventricular end diastolic pressure.  7. Moderate right atrial enlargement.  8. Right ventricular enlargement with decreased right  ventricular systolic function.  Diastolic septal flattening  consistent with right ventricular volume overload.  9. Thickened tricuspid valve. Moderate-severe tricuspid  regurgitation.  10. Trace pericardial effusion.  ------------------------------------------------------------------------  Confirmed on  2017 - 15:54:57 by Matias Stubbs M.D.  ------------------------------------------------------------------------    < end of copied text >  < from: Cardiac Cath Lab - Adult (17 @ 15:17) >    [facility]  Department of Cardiology  [address] [place] [address] [phone]  Cath Lab Report -- Comprehensive Report  Patient: RICKY HAMPTON  Study date: 2017  Account number: [id number]  MR number: [id number]  : 1955  Gender: Male  Race:  Amer  Case Physician(s):  Tae Hernandez M.D.  Fellow:  Fredi Howard M.D.  Referring Physician:  MD Lakshmi Bowden M.D.  INDICATIONS: Acute systolic congestive heart failure.  HISTORY: History includes nonischemic cardiomyopathy. The patient has  hypertension.  PROCEDURE:  --  Right heart catheterization.  --  Left heart catheterization.  --  Left coronary angiography.  --  Right coronary angiography.  --  Sonosite - Diagnostic.  TECHNIQUE: The risks and alternatives of the procedures and conscious  sedation were explained to the patient and informed consent was obtained.  Cardiac catheterization performed electively.  Local anesthetic given. Right femoral artery access. Right femoral vein  access. Right heart catheterization. The procedure was performed utilizing  a catheter. Left heart catheterization. Left coronary artery angiography.  The vessel was injected utilizing a catheter. Right coronary artery  angiography. The vessel was injected utilizing a catheter. [facility] -  Diagnostic. RADIATION EXPOSURE: 10.4 min.  CONTRAST GIVEN: Omnipaque 18 ml.  CORONARY VESSELS: The coronary circulation is right dominant.  LM:   --  LM: Normal.  LAD:   --  LAD: Normal.  CX:  --  Circumflex: Normal.  RCA:   --  RCA: Normal.  COMPLICATIONS: There were no complications.  DIAGNOSTIC RECOMMENDATIONS: Pts LVEDP is 40 so will admit to CCU  INTERVENTIONAL RECOMMENDATIONS: Pts LVEDP is 40 so will admit to CCU  Prepared and signed by  Tae Hernandez M.D.  Signed 2017 08:45:48  HEMODYNAMIC TABLES  Pressures:  Baseline  Pressures:  - HR: 100  Pressures:  - Rhythm:  Pressures:  -- Aortic Pressure (S/D/M): --/--/76  Pressures:  -- Left Ventricle (s/edp): 88/32/--  Pressures:  -- Pulmonary Artery (S/D/M): 54/27/37  Pressures:  -- Right Atrium (a/v/M): 20/18/16  Pressures:  -- Right Ventricle (s/edp): 49/21/--  O2 Sats:  Baseline  O2 Sats:  - HR: 100  O2 Sats:  - Rhythm:  O2 Sats:  -- AO: 13.6/92.2/17.05  O2 Sats:  -- PA:13.6/30.1/5.57  Outputs:  Baseline  Outputs:  -- CALCULATIONS[age]  Outputs:  -- CALCULATIONS: Body Surface Area: 1.82  Outputs:  -- CALCULATIONS: Height in cm: 177.00  Outputs:  -- CALCULATIONS: Sex: Male  Outputs:  -- CALCULATIONS: Weight in k.00  Outputs:  -- OUTPUTS: Blood Oxygen Difference: 11.49  Outputs:  -- OUTPUTS: CO by Kemar: 1.15  Outputs:  -- OUTPUTS: Kemar cardiac index: 0.63  Outputs:  -- OUTPUTS: O2 consumption: 132.18  Outputs:  -- OUTPUTS: Vo2 Indexed: 72.73  Outputs:  -- RESISTANCES: Right ventricular stroke work: 3.39  Outputs:  -- RESISTANCES: Right ventricular stroke work index: 1.86  Outputs:  -- RESISTANCES: Systemic vascular index (dsc): 7578.61  Outputs:  -- RESISTANCES: Systemic vascular index (Wood Units): 94.76  Outputs:  -- RESISTANCES: Systemic vascular resistance (dsc): 4170.00  Outputs:  -- RESISTANCES: Systemic vascular resistance (Wood Units): 52.14  Outputs:  -- RESISTANCES: Total pulmonary index (dsc): 4673.48  Outputs:  -- RESISTANCES: Total pulmonary index (Wood Units): 58.43  Outputs:  -- RESISTANCES: Total pulmonary resistance (dsc): 2571.50  Outputs:  -- RESISTANCES: Total pulmonary resistance (Wood Units): 32.15  Outputs:  -- RESISTANCES: Total vascular index (Wood Units): 120.02  Outputs:  -- RESISTANCES: Total vascular resistance (dsc): 5282.00  Outputs:  -- RESISTANCES: Total vascular resistance (Wood Units): 66.04  Outputs:  -- RESISTANCES: Total vascular resistance index (dsc): 9599.57  Outputs:  -- RESISTANCES: TPR_TVR Ratio: 0.49  Outputs:  -- SHUNTS: Qs Indexed: 0.63  Outputs:  -- SHUNTS: Systemic flow: 1.15    < end of copied text >    < from: MRI Cardiac w/wo Cont (17 @ 14:56) >    EXAM:  MRI CARDIAC W WO C                            PROCEDURE DATE:  2017            INTERPRETATION:  MRI CARDIAC WITHOUT AND WITH CONTRAST      INDICATION: Possible left ventricular thrombus or trabeculation.    COMPARISON: CTA 2017..    TECHNIQUE: Multi-sequential, multi-planar cardiac MRI was performed   before and after the intravenous administration of 100 mL of Gadavist; 0   mL was discarded. Resting myocardial perfusion imaging was performed.   Delayed viability imaging was performed. The indexed values were   calculated based on a BSA of 1.82 m2.    SCANNER: Kit[employer]ost 1.5 T [employer] platform using a cardiac coil.    QUALITY: Fair.      FINDINGS:      MORPHOLOGY:    RIGHT ATRIUM: The right atrium is qualitatively enlarged.     RIGHT VENTRICLE: The right ventricle is qualitatively enlarged.    LEFT ATRIUM: The left atrium is enlarged. The left atrium measures 4.2 cm   in the 3 chamber plane.    LEFT VENTRICLE: The left ventricle is dilated the left ventricle measures   7.7 cm in the short axis (anteroseptal/inferolateral). The late   gadolinium enhanced images were reduced in quality secondary to shallow   respiratory breathing artifact. However, there is likely subtle, late   gadolinium enhancement along the anteroseptal wall.      FUNCTION: The left ventricle is globally hypokinetic.     LEFT VENTRICLE:  Unindexed:  EF: 16.85 %  EDV: 330.67 mL  ESV: 274.97 mL  SV: 55.70 mL  CO: 5.91 L/min    Indexed:  EDVi: 183.05 mL/m2  ESVi: 152.21 mL/m2  SV: 30.84 mL/m2  CO: 3.27 L/min/m2      VALVES:  PULMONIC VALVE: Pulmonic insufficiency.  TRICUSPID VALVE: Tricuspid insufficiency.  MITRAL VALVE: Mitral regurgitation.  AORTIC VALVE: Unremarkable.      AORTA: Three-vessel left-sided aortic arch and left-sided descending   thoracic aorta.      NONCARDIAC FINDINGS: Bilateral pleural effusions. Bilateral atelectasis.   The findings suspicious for edema or better shown on the recent CT.      IMPRESSION:    1.  Technically difficult exam secondary to respiratory motion artifact.  2.  Decreased left ventricular ejection fraction with a calculated   ejection fraction 16.5%.  3.  No convincing findings to suggest ventricular thrombus.  4.  Likely mid myocardial focus of late gadolinium enhancement it is   nonspecific butcan occur in the clinical setting of left ventricular   heart failure.  5.  Mitral valve and tricuspid valve insufficiency.                    PAULINO GREGORY M.D., ATTENDING RADIOLOGIST  This document has been electronically signed. Aug 30 2017  5:33PM    < end of copied text >      TELEMETRY: 	  In the last 24 hours, there was normal sinus rhythm on telemetry, the heart rate varies between . There was some ventricular ectopy.     ECG:  	  SR , LVH, LAD, LAE

## 2017-09-01 NOTE — PROGRESS NOTE ADULT - PROBLEM SELECTOR PLAN 1
- on lasix 40IV BID  only  i/o 1000/1000 yesterday  - change to Bumex 2mg IV BID  - start isosorbide dinitrate 10mg TID.   - hold BB  -  initiating LVAD discussion with patient and family. Will trial nitroprusside  - on lasix 40IV BID  only  i/o 1000/1000 yesterday  -  initiating LVAD discussion with patient and family.

## 2017-09-01 NOTE — PROGRESS NOTE ADULT - PROBLEM SELECTOR PLAN 1
EF 10% with CO 1.15, CI 0.63  PA Systolic 54--significant PA htn in setting of left heart failure--Numbers high in the context of severe MR and moderate TR-will like to see a trend instead of solitary values but no swan in place   net neg 40 overnight--UOP 35/hr  c/w Beta Blocker. Non ischemic cardiomyopathy as LHC unremarkable   Monitor Strict Is/Os--will consider increasing lasix vs Inotrope   Life Vest eval given EF~10%?

## 2017-09-01 NOTE — PROGRESS NOTE ADULT - PROBLEM SELECTOR PLAN 1
Cardiology follow-up note, medications as per them. Started on beta blockers and ACE Inh  Patient does not have ischemia on cardiac catheterization.  Given his severely low ejection fraction, he is at increased risk for sudden cardiac death due to ventricular tachycardia.  An external defibrillator should be considered.  Will discuss with the CCU team.

## 2017-09-01 NOTE — PROGRESS NOTE ADULT - SUBJECTIVE AND OBJECTIVE BOX
Patient is a 62y old  Male who presents with a chief complaint of SOOB (25 Aug 2017 22:27)    doing ok  ++ sob   no cough  in ccu : CI too low     Any change in ROS:     MEDICATIONS  (STANDING):  pantoprazole    Tablet 40 milliGRAM(s) Oral two times a day before meals  sucralfate suspension 1 Gram(s) Oral four times a day  furosemide   Injectable 40 milliGRAM(s) IV Push two times a day  aspirin  chewable 81 milliGRAM(s) Oral daily  atorvastatin 20 milliGRAM(s) Oral at bedtime  tiotropium 18 MICROgram(s) Capsule 1 Capsule(s) Inhalation daily  multivitamin 1 Tablet(s) Oral daily  heparin  Injectable 5000 Unit(s) SubCutaneous every 12 hours  buDESOnide 160 MICROgram(s)/formoterol 4.5 MICROgram(s) Inhaler 2 Puff(s) Inhalation two times a day    MEDICATIONS  (PRN):  ondansetron Injectable 4 milliGRAM(s) IV Push every 6 hours PRN Nausea and/or Vomiting    Vital Signs Last 24 Hrs  T(C): 36.4 (01 Sep 2017 09:00), Max: 36.8 (01 Sep 2017 03:00)  T(F): 97.6 (01 Sep 2017 09:00), Max: 98.2 (01 Sep 2017 03:00)  HR: 102 (01 Sep 2017 09:00) (88 - 102)  BP: 94/81 (01 Sep 2017 09:00) (77/58 - 112/86)  BP(mean): 86 (01 Sep 2017 09:00) (63 - 94)  RR: 16 (01 Sep 2017 09:00) (10 - 30)  SpO2: 94% (01 Sep 2017 08:00) (90% - 100%)    I&O's Summary    31 Aug 2017 07:01  -  01 Sep 2017 07:00  --------------------------------------------------------  IN: 960 mL / OUT: 1000 mL / NET: -40 mL    01 Sep 2017 07:01  -  01 Sep 2017 10:12  --------------------------------------------------------  IN: 0 mL / OUT: 50 mL / NET: -50 mL          Physical Exam:   GENERAL: NAD, well-groomed, well-developed  HEENT: ALONSO/   Atraumatic, Normocephalic  ENMT: No tonsillar erythema, exudates, or enlargement; Moist mucous membranes, Good dentition, No lesions  NECK: JVD ++  CHEST/LUNG: Clear to auscultaion, ; No rales, rhonchi, wheezing, or rubs  CVS: Regular rate and rhythm; No murmurs, rubs, or gallops  GI: : Soft, Nontender, Nondistended; Bowel sounds present  NERVOUS SYSTEM:  Alert & Oriented X3  EXTREMITIES:  noneedema  LYMPH: No lymphadenopathy noted  SKIN: No rashes or lesions  ENDOCRINOLOGY: No Thyromegaly  PSYCH: Appropriate    Labs:  ABG - ( 30 Aug 2017 10:28 )  pH: 7.43  /  pCO2: 43    /  pO2: 59    / HCO3: 28    / Base Excess: 3.7   /  SaO2: 87        CARDIAC MARKERS ( 31 Aug 2017 18:57 )  x     / 0.02 ng/mL / 160 U/L / x     / 4.2 ng/mL  CARDIAC MARKERS ( 31 Aug 2017 06:29 )  x     / <0.01 ng/mL / 161 U/L / x     / 2.4 ng/mL                            14.9   5.2   )-----------( 134      ( 01 Sep 2017 05:18 )             45.4                         15.0   4.8   )-----------( 134      ( 31 Aug 2017 18:57 )             45.4                         14.9   5.0   )-----------( 154      ( 31 Aug 2017 10:54 )             45.5                         14.5   5.3   )-----------( 150      ( 30 Aug 2017 10:45 )             43.9                         16.2   7.4   )-----------( 151      ( 29 Aug 2017 09:56 )             51.2                         14.7   7.0   )-----------( 150      ( 29 Aug 2017 00:38 )             45.7     09-01    136  |  96  |  35<H>  ----------------------------<  158<H>  4.7   |  25  |  1.19  08-31    139  |  100  |  33<H>  ----------------------------<  130<H>  3.9   |  24  |  1.06  08-31    136  |  97  |  33<H>  ----------------------------<  139<H>  4.3   |  23  |  1.08  08-30    135  |  98  |  39<H>  ----------------------------<  163<H>  4.2   |  22  |  1.21  08-29    137  |  98  |  39<H>  ----------------------------<  184<H>  4.2   |  26  |  1.26    Ca    9.3      01 Sep 2017 05:18  Ca    8.4      31 Aug 2017 18:57  Ca    9.2      31 Aug 2017 06:29  Ca    9.3      30 Aug 2017 10:45  Phos  4.4     09-01  Phos  3.2     08-31  Mg     2.5     09-01  Mg     2.3     08-31    TPro  6.7  /  Alb  3.6  /  TBili  0.6  /  DBili  x   /  AST  73<H>  /  ALT  147<H>  /  AlkPhos  127<H>  09-01  TPro  6.1  /  Alb  3.3  /  TBili  0.4  /  DBili  x   /  AST  72<H>  /  ALT  147<H>  /  AlkPhos  121<H>  08-31    CAPILLARY BLOOD GLUCOSE          LIVER FUNCTIONS - ( 01 Sep 2017 05:18 )  Alb: 3.6 g/dL / Pro: 6.7 g/dL / ALK PHOS: 127 U/L / ALT: 147 U/L RC / AST: 73 U/L / GGT: x           PTT - ( 31 Aug 2017 10:54 )  PTT:> 200 sec    D-Dimer Assay, Quantitative: 4027 ng/mL DDU (08-25 @ 14:41)  Cultures:                       Rapid Respiratory Viral Panel Result        08-25 @ 15:06  Rapid RVP NotDetec  Coronovirus --  Adenovirus --  Bordetella Pertussis --  Chlamydia Pneumonia --  Entero/Rhinovirus--  HKU1 Coronovirus --  HMPV Coronovirus --  Influenza A --  Influenza AH1 --  Influenza AH1 2009 --  Influenza AH3 --  Influenza B --  Mycoplasma Pneumoniae --  NL63 Coronovirus --  OC43 Coronovirus --  Parainfluenza 1 --  Parainfluenza 2 --  Parainfluenza 3 --  Parainfluenza 4 --  Resp Syncytial Virus --          Studies  Chest X-RAY  CT SCAN Chest   Venous Dopplers: LE:   CT Abdomen  Others        < from: Xray Abdomen 1 View PORTABLE -Urgent (08.31.17 @ 07:44) >    PROCEDURE DATE:  08/31/2017            INTERPRETATION:  CLINICAL INFORMATION: Abdominal pain; constipation    EXAM: Supine views of the abdomen from 8/31/2017.    COMPARISON: CT scan of the abdomen and pelvis 8/27/2017    FINDINGS:  Nonobstructive bowel gas pattern.  There is no evidence of intraperitoneal free air.  Significant stool is seen within the rectum.  Degenerative changes of the lumbar spine are present.    IMPRESSION:   Nonobstructive bowel gas pattern without evidence of free air.                JD BLAS M.D., RADIOLOGY RESIDENT  This document has been electronically signed.  CANDIDO WISDOM M.D., ATTENDING RADIOLOGIST  This document has been electronically signed. Aug 31 2017  9:56AM    < end of copied text >

## 2017-09-01 NOTE — PROGRESS NOTE ADULT - PROBLEM SELECTOR PLAN 2
on IV lasix: His pleural effusions are likely due to CHF   Continue diuresis  no LV thrombus on MRI : has poor ejection fraction  evaluation for life vest per cardiology  monitor in CCU

## 2017-09-01 NOTE — PROGRESS NOTE ADULT - PROBLEM SELECTOR PLAN 2
- will initiate ACE after hydral and isosorbide.   - monitor cr, hold nephrotoxic drugs. - monitor cr, hold nephrotoxic drugs.

## 2017-09-02 DIAGNOSIS — I50.23 ACUTE ON CHRONIC SYSTOLIC (CONGESTIVE) HEART FAILURE: ICD-10-CM

## 2017-09-02 DIAGNOSIS — F17.200 NICOTINE DEPENDENCE, UNSPECIFIED, UNCOMPLICATED: ICD-10-CM

## 2017-09-02 DIAGNOSIS — R57.0 CARDIOGENIC SHOCK: ICD-10-CM

## 2017-09-02 LAB
ALBUMIN SERPL ELPH-MCNC: 3.1 G/DL — LOW (ref 3.3–5)
ALBUMIN SERPL ELPH-MCNC: 3.2 G/DL — LOW (ref 3.3–5)
ALP SERPL-CCNC: 102 U/L — SIGNIFICANT CHANGE UP (ref 40–120)
ALP SERPL-CCNC: 103 U/L — SIGNIFICANT CHANGE UP (ref 40–120)
ALT FLD-CCNC: 85 U/L RC — HIGH (ref 10–45)
ALT FLD-CCNC: 98 U/L RC — HIGH (ref 10–45)
ANION GAP SERPL CALC-SCNC: 12 MMOL/L — SIGNIFICANT CHANGE UP (ref 5–17)
ANION GAP SERPL CALC-SCNC: 9 MMOL/L — SIGNIFICANT CHANGE UP (ref 5–17)
AST SERPL-CCNC: 42 U/L — HIGH (ref 10–40)
AST SERPL-CCNC: 50 U/L — HIGH (ref 10–40)
BASE EXCESS BLDMV CALC-SCNC: 3.7 MMOL/L — HIGH (ref -3–3)
BASE EXCESS BLDMV CALC-SCNC: 5.2 MMOL/L — HIGH (ref -3–3)
BASE EXCESS BLDMV CALC-SCNC: 5.9 MMOL/L — HIGH (ref -3–3)
BASOPHILS # BLD AUTO: 0 K/UL — SIGNIFICANT CHANGE UP (ref 0–0.2)
BASOPHILS NFR BLD AUTO: 0.4 % — SIGNIFICANT CHANGE UP (ref 0–2)
BASOPHILS NFR BLD AUTO: 0.7 % — SIGNIFICANT CHANGE UP (ref 0–2)
BASOPHILS NFR BLD AUTO: 1.1 % — SIGNIFICANT CHANGE UP (ref 0–2)
BILIRUB SERPL-MCNC: 0.4 MG/DL — SIGNIFICANT CHANGE UP (ref 0.2–1.2)
BILIRUB SERPL-MCNC: 0.5 MG/DL — SIGNIFICANT CHANGE UP (ref 0.2–1.2)
BLOOD GAS SOURCE: SIGNIFICANT CHANGE UP
BUN SERPL-MCNC: 34 MG/DL — HIGH (ref 7–23)
BUN SERPL-MCNC: 38 MG/DL — HIGH (ref 7–23)
CALCIUM SERPL-MCNC: 8.5 MG/DL — SIGNIFICANT CHANGE UP (ref 8.4–10.5)
CALCIUM SERPL-MCNC: 8.6 MG/DL — SIGNIFICANT CHANGE UP (ref 8.4–10.5)
CHLORIDE SERPL-SCNC: 101 MMOL/L — SIGNIFICANT CHANGE UP (ref 96–108)
CHLORIDE SERPL-SCNC: 98 MMOL/L — SIGNIFICANT CHANGE UP (ref 96–108)
CO2 BLDMV-SCNC: 30 MMOL/L — HIGH (ref 21–29)
CO2 BLDMV-SCNC: 32 MMOL/L — HIGH (ref 21–29)
CO2 BLDMV-SCNC: 32 MMOL/L — HIGH (ref 21–29)
CO2 SERPL-SCNC: 26 MMOL/L — SIGNIFICANT CHANGE UP (ref 22–31)
CO2 SERPL-SCNC: 27 MMOL/L — SIGNIFICANT CHANGE UP (ref 22–31)
CREAT SERPL-MCNC: 1.09 MG/DL — SIGNIFICANT CHANGE UP (ref 0.5–1.3)
CREAT SERPL-MCNC: 1.2 MG/DL — SIGNIFICANT CHANGE UP (ref 0.5–1.3)
CRP SERPL-MCNC: 0.3 MG/DL — SIGNIFICANT CHANGE UP (ref 0–0.4)
EOSINOPHIL # BLD AUTO: 0.1 K/UL — SIGNIFICANT CHANGE UP (ref 0–0.5)
EOSINOPHIL # BLD AUTO: 0.1 K/UL — SIGNIFICANT CHANGE UP (ref 0–0.5)
EOSINOPHIL # BLD AUTO: 0.2 K/UL — SIGNIFICANT CHANGE UP (ref 0–0.5)
EOSINOPHIL NFR BLD AUTO: 1.4 % — SIGNIFICANT CHANGE UP (ref 0–6)
EOSINOPHIL NFR BLD AUTO: 2.6 % — SIGNIFICANT CHANGE UP (ref 0–6)
EOSINOPHIL NFR BLD AUTO: 3 % — SIGNIFICANT CHANGE UP (ref 0–6)
GAS PNL BLDA: SIGNIFICANT CHANGE UP
GAS PNL BLDMV: SIGNIFICANT CHANGE UP
GLUCOSE SERPL-MCNC: 116 MG/DL — HIGH (ref 70–99)
GLUCOSE SERPL-MCNC: 147 MG/DL — HIGH (ref 70–99)
HCO3 BLDMV-SCNC: 28 MMOL/L — SIGNIFICANT CHANGE UP (ref 20–28)
HCO3 BLDMV-SCNC: 30 MMOL/L — HIGH (ref 20–28)
HCO3 BLDMV-SCNC: 31 MMOL/L — HIGH (ref 20–28)
HCT VFR BLD CALC: 38.4 % — LOW (ref 39–50)
HCT VFR BLD CALC: 38.4 % — LOW (ref 39–50)
HCT VFR BLD CALC: 41.3 % — SIGNIFICANT CHANGE UP (ref 39–50)
HGB BLD CALC-MCNC: 12.6 G/DL — LOW (ref 13–17)
HGB BLD-MCNC: 12.7 G/DL — LOW (ref 13–17)
HGB BLD-MCNC: 13.2 G/DL — SIGNIFICANT CHANGE UP (ref 13–17)
HGB BLD-MCNC: 13.8 G/DL — SIGNIFICANT CHANGE UP (ref 13–17)
HIV 1+2 AB+HIV1 P24 AG SERPL QL IA: SIGNIFICANT CHANGE UP
HOROWITZ INDEX BLDMV+IHG-RTO: 21 — SIGNIFICANT CHANGE UP
HOROWITZ INDEX BLDMV+IHG-RTO: 21 — SIGNIFICANT CHANGE UP
IRON SATN MFR SERPL: 20 % — SIGNIFICANT CHANGE UP (ref 16–55)
IRON SATN MFR SERPL: 51 UG/DL — SIGNIFICANT CHANGE UP (ref 45–165)
LYMPHOCYTES # BLD AUTO: 1.3 K/UL — SIGNIFICANT CHANGE UP (ref 1–3.3)
LYMPHOCYTES # BLD AUTO: 1.5 K/UL — SIGNIFICANT CHANGE UP (ref 1–3.3)
LYMPHOCYTES # BLD AUTO: 1.5 K/UL — SIGNIFICANT CHANGE UP (ref 1–3.3)
LYMPHOCYTES # BLD AUTO: 24.6 % — SIGNIFICANT CHANGE UP (ref 13–44)
LYMPHOCYTES # BLD AUTO: 25.1 % — SIGNIFICANT CHANGE UP (ref 13–44)
LYMPHOCYTES # BLD AUTO: 33.3 % — SIGNIFICANT CHANGE UP (ref 13–44)
MAGNESIUM SERPL-MCNC: 2 MG/DL — SIGNIFICANT CHANGE UP (ref 1.6–2.6)
MAGNESIUM SERPL-MCNC: 2.2 MG/DL — SIGNIFICANT CHANGE UP (ref 1.6–2.6)
MCHC RBC-ENTMCNC: 32.5 PG — SIGNIFICANT CHANGE UP (ref 27–34)
MCHC RBC-ENTMCNC: 32.9 PG — SIGNIFICANT CHANGE UP (ref 27–34)
MCHC RBC-ENTMCNC: 33 GM/DL — SIGNIFICANT CHANGE UP (ref 32–36)
MCHC RBC-ENTMCNC: 33.4 GM/DL — SIGNIFICANT CHANGE UP (ref 32–36)
MCHC RBC-ENTMCNC: 33.7 PG — SIGNIFICANT CHANGE UP (ref 27–34)
MCHC RBC-ENTMCNC: 34.4 GM/DL — SIGNIFICANT CHANGE UP (ref 32–36)
MCV RBC AUTO: 98.2 FL — SIGNIFICANT CHANGE UP (ref 80–100)
MCV RBC AUTO: 98.5 FL — SIGNIFICANT CHANGE UP (ref 80–100)
MCV RBC AUTO: 98.5 FL — SIGNIFICANT CHANGE UP (ref 80–100)
METHGB MFR BLDV: 0.2 % — SIGNIFICANT CHANGE UP (ref 0–1.5)
MONOCYTES # BLD AUTO: 0.6 K/UL — SIGNIFICANT CHANGE UP (ref 0–0.9)
MONOCYTES NFR BLD AUTO: 10.3 % — SIGNIFICANT CHANGE UP (ref 2–14)
MONOCYTES NFR BLD AUTO: 12.1 % — SIGNIFICANT CHANGE UP (ref 2–14)
MONOCYTES NFR BLD AUTO: 13.4 % — SIGNIFICANT CHANGE UP (ref 2–14)
MRSA PCR RESULT.: SIGNIFICANT CHANGE UP
NEUTROPHILS # BLD AUTO: 2.3 K/UL — SIGNIFICANT CHANGE UP (ref 1.8–7.4)
NEUTROPHILS # BLD AUTO: 3 K/UL — SIGNIFICANT CHANGE UP (ref 1.8–7.4)
NEUTROPHILS # BLD AUTO: 3.8 K/UL — SIGNIFICANT CHANGE UP (ref 1.8–7.4)
NEUTROPHILS NFR BLD AUTO: 50.8 % — SIGNIFICANT CHANGE UP (ref 43–77)
NEUTROPHILS NFR BLD AUTO: 59.7 % — SIGNIFICANT CHANGE UP (ref 43–77)
NEUTROPHILS NFR BLD AUTO: 61.4 % — SIGNIFICANT CHANGE UP (ref 43–77)
O2 CT VFR BLD CALC: 33 MMHG — SIGNIFICANT CHANGE UP (ref 30–65)
O2 CT VFR BLD CALC: 35 MMHG — SIGNIFICANT CHANGE UP (ref 30–65)
O2 CT VFR BLD CALC: 39 MMHG — SIGNIFICANT CHANGE UP (ref 30–65)
PCO2 BLDMV: 45 MMHG — SIGNIFICANT CHANGE UP (ref 30–65)
PCO2 BLDMV: 47 MMHG — SIGNIFICANT CHANGE UP (ref 30–65)
PCO2 BLDMV: 47 MMHG — SIGNIFICANT CHANGE UP (ref 30–65)
PH BLDMV: 7.42 — SIGNIFICANT CHANGE UP (ref 7.32–7.45)
PH BLDMV: 7.42 — SIGNIFICANT CHANGE UP (ref 7.32–7.45)
PH BLDMV: 7.43 — SIGNIFICANT CHANGE UP (ref 7.32–7.45)
PHOSPHATE SERPL-MCNC: 2.3 MG/DL — LOW (ref 2.5–4.5)
PHOSPHATE SERPL-MCNC: 3.4 MG/DL — SIGNIFICANT CHANGE UP (ref 2.5–4.5)
PLATELET # BLD AUTO: 112 K/UL — LOW (ref 150–400)
PLATELET # BLD AUTO: 114 K/UL — LOW (ref 150–400)
PLATELET # BLD AUTO: 123 K/UL — LOW (ref 150–400)
POTASSIUM SERPL-MCNC: 4 MMOL/L — SIGNIFICANT CHANGE UP (ref 3.5–5.3)
POTASSIUM SERPL-MCNC: 4.1 MMOL/L — SIGNIFICANT CHANGE UP (ref 3.5–5.3)
POTASSIUM SERPL-SCNC: 4 MMOL/L — SIGNIFICANT CHANGE UP (ref 3.5–5.3)
POTASSIUM SERPL-SCNC: 4.1 MMOL/L — SIGNIFICANT CHANGE UP (ref 3.5–5.3)
PREALB SERPL-MCNC: 22 MG/DL — SIGNIFICANT CHANGE UP (ref 18–45)
PROT ?TM UR-MCNC: 46 MG/DL — HIGH (ref 0–12)
PROT SERPL-MCNC: 5.7 G/DL — LOW (ref 6–8.3)
PROT SERPL-MCNC: 5.8 G/DL — LOW (ref 6–8.3)
RBC # BLD: 3.9 M/UL — LOW (ref 4.2–5.8)
RBC # BLD: 3.91 M/UL — LOW (ref 4.2–5.8)
RBC # BLD: 4.19 M/UL — LOW (ref 4.2–5.8)
RBC # FLD: 14 % — SIGNIFICANT CHANGE UP (ref 10.3–14.5)
RBC # FLD: 14 % — SIGNIFICANT CHANGE UP (ref 10.3–14.5)
RBC # FLD: 14.2 % — SIGNIFICANT CHANGE UP (ref 10.3–14.5)
S AUREUS DNA NOSE QL NAA+PROBE: SIGNIFICANT CHANGE UP
SAO2 % BLDMV: 54 % — LOW (ref 60–90)
SAO2 % BLDMV: 58 % — LOW (ref 60–90)
SAO2 % BLDMV: 63 % — SIGNIFICANT CHANGE UP (ref 60–90)
SODIUM SERPL-SCNC: 136 MMOL/L — SIGNIFICANT CHANGE UP (ref 135–145)
SODIUM SERPL-SCNC: 137 MMOL/L — SIGNIFICANT CHANGE UP (ref 135–145)
TIBC SERPL-MCNC: 261 UG/DL — SIGNIFICANT CHANGE UP (ref 220–430)
UIBC SERPL-MCNC: 210 UG/DL — SIGNIFICANT CHANGE UP (ref 110–370)
WBC # BLD: 4.6 K/UL — SIGNIFICANT CHANGE UP (ref 3.8–10.5)
WBC # BLD: 5 K/UL — SIGNIFICANT CHANGE UP (ref 3.8–10.5)
WBC # BLD: 6.2 K/UL — SIGNIFICANT CHANGE UP (ref 3.8–10.5)
WBC # FLD AUTO: 4.6 K/UL — SIGNIFICANT CHANGE UP (ref 3.8–10.5)
WBC # FLD AUTO: 5 K/UL — SIGNIFICANT CHANGE UP (ref 3.8–10.5)
WBC # FLD AUTO: 6.2 K/UL — SIGNIFICANT CHANGE UP (ref 3.8–10.5)

## 2017-09-02 PROCEDURE — 99233 SBSQ HOSP IP/OBS HIGH 50: CPT

## 2017-09-02 PROCEDURE — 71010: CPT | Mod: 26

## 2017-09-02 PROCEDURE — 93010 ELECTROCARDIOGRAM REPORT: CPT

## 2017-09-02 RX ORDER — SODIUM,POTASSIUM PHOSPHATES 278-250MG
1 POWDER IN PACKET (EA) ORAL ONCE
Qty: 0 | Refills: 0 | Status: COMPLETED | OUTPATIENT
Start: 2017-09-02 | End: 2017-09-02

## 2017-09-02 RX ORDER — SODIUM,POTASSIUM PHOSPHATES 278-250MG
1 POWDER IN PACKET (EA) ORAL
Qty: 0 | Refills: 0 | Status: COMPLETED | OUTPATIENT
Start: 2017-09-03 | End: 2017-09-03

## 2017-09-02 RX ORDER — SODIUM NITROPRUSSIDE 50 MG/2ML
0.75 INJECTION INTRAVENOUS
Qty: 200 | Refills: 0 | Status: DISCONTINUED | OUTPATIENT
Start: 2017-09-02 | End: 2017-09-02

## 2017-09-02 RX ORDER — SODIUM NITROPRUSSIDE 50 MG/2ML
0.7 INJECTION INTRAVENOUS
Qty: 200 | Refills: 0 | Status: DISCONTINUED | OUTPATIENT
Start: 2017-09-02 | End: 2017-09-07

## 2017-09-02 RX ADMIN — Medication 1 TABLET(S): at 22:56

## 2017-09-02 RX ADMIN — Medication 1 GRAM(S): at 22:03

## 2017-09-02 RX ADMIN — SODIUM NITROPRUSSIDE 6.75 MICROGRAM(S)/KG/MIN: 50 INJECTION INTRAVENOUS at 16:53

## 2017-09-02 RX ADMIN — BUDESONIDE AND FORMOTEROL FUMARATE DIHYDRATE 2 PUFF(S): 160; 4.5 AEROSOL RESPIRATORY (INHALATION) at 20:58

## 2017-09-02 RX ADMIN — BUDESONIDE AND FORMOTEROL FUMARATE DIHYDRATE 2 PUFF(S): 160; 4.5 AEROSOL RESPIRATORY (INHALATION) at 10:43

## 2017-09-02 RX ADMIN — PANTOPRAZOLE SODIUM 40 MILLIGRAM(S): 20 TABLET, DELAYED RELEASE ORAL at 16:56

## 2017-09-02 RX ADMIN — HEPARIN SODIUM 5000 UNIT(S): 5000 INJECTION INTRAVENOUS; SUBCUTANEOUS at 16:56

## 2017-09-02 RX ADMIN — SODIUM NITROPRUSSIDE 6.75 MICROGRAM(S)/KG/MIN: 50 INJECTION INTRAVENOUS at 22:04

## 2017-09-02 RX ADMIN — Medication 1 GRAM(S): at 16:56

## 2017-09-02 RX ADMIN — Medication 1 TABLET(S): at 11:31

## 2017-09-02 RX ADMIN — Medication 1 GRAM(S): at 11:31

## 2017-09-02 RX ADMIN — Medication 1 GRAM(S): at 05:12

## 2017-09-02 RX ADMIN — SODIUM NITROPRUSSIDE 6.75 MICROGRAM(S)/KG/MIN: 50 INJECTION INTRAVENOUS at 10:45

## 2017-09-02 RX ADMIN — HEPARIN SODIUM 5000 UNIT(S): 5000 INJECTION INTRAVENOUS; SUBCUTANEOUS at 05:13

## 2017-09-02 RX ADMIN — PANTOPRAZOLE SODIUM 40 MILLIGRAM(S): 20 TABLET, DELAYED RELEASE ORAL at 05:12

## 2017-09-02 RX ADMIN — ATORVASTATIN CALCIUM 20 MILLIGRAM(S): 80 TABLET, FILM COATED ORAL at 22:03

## 2017-09-02 NOTE — PROGRESS NOTE ADULT - PROBLEM SELECTOR PLAN 3
He is not a candidate at the moment for transplant due to history of smoking and will need to be free of cigarettes for six months prior to candidacy. We will  on cessation.

## 2017-09-02 NOTE — PROGRESS NOTE ADULT - SUBJECTIVE AND OBJECTIVE BOX
Patient is a 62y old  Male who presents with a chief complaint of SOOB (25 Aug 2017 22:27)      Any change in ROS:     MEDICATIONS  (STANDING):  pantoprazole    Tablet 40 milliGRAM(s) Oral two times a day before meals  sucralfate suspension 1 Gram(s) Oral four times a day  atorvastatin 20 milliGRAM(s) Oral at bedtime  tiotropium 18 MICROgram(s) Capsule 1 Capsule(s) Inhalation daily  multivitamin 1 Tablet(s) Oral daily  heparin  Injectable 5000 Unit(s) SubCutaneous every 12 hours  buDESOnide 160 MICROgram(s)/formoterol 4.5 MICROgram(s) Inhaler 2 Puff(s) Inhalation two times a day  nitroprusside Infusion 0.75 MICROgram(s)/kG/Min (6.75 mL/Hr) IV Continuous <Continuous>    MEDICATIONS  (PRN):  ondansetron Injectable 4 milliGRAM(s) IV Push every 6 hours PRN Nausea and/or Vomiting    Vital Signs Last 24 Hrs  T(C): 36.6 (02 Sep 2017 09:00), Max: 36.7 (01 Sep 2017 17:00)  T(F): 97.9 (02 Sep 2017 09:00), Max: 98.1 (01 Sep 2017 17:00)  HR: 100 (02 Sep 2017 10:00) (86 - 106)  BP: 92/71 (01 Sep 2017 17:00) (89/70 - 100/74)  BP(mean): 84 (01 Sep 2017 17:00) (76 - 85)  RR: 20 (02 Sep 2017 10:00) (8 - 30)  SpO2: 96% (02 Sep 2017 10:00) (90% - 99%)    I&O's Summary    01 Sep 2017 07:01  -  02 Sep 2017 07:00  --------------------------------------------------------  IN: 782.1 mL / OUT: 330 mL / NET: 452.1 mL    02 Sep 2017 07:01  -  02 Sep 2017 10:42  --------------------------------------------------------  IN: 235.4 mL / OUT: 200 mL / NET: 35.4 mL          Physical Exam:   GENERAL: NAD, well-groomed, well-developed  HEENT: ALONSO/   Atraumatic, Normocephalic  ENMT: No tonsillar erythema, exudates, or enlargement; Moist mucous membranes, Good dentition, No lesions  NECK: Supple, No JVD, Normal thyroid  CHEST/LUNG: Clear to auscultaion, ; No rales, rhonchi, wheezing, or rubs  CVS: Regular rate and rhythm; No murmurs, rubs, or gallops  GI: : Soft, Nontender, Nondistended; Bowel sounds present  NERVOUS SYSTEM:  Alert & Oriented X3, Good concentration; Motor Strength 5/5 B/L upper and lower extremities; DTRs 2+ intact and symmetric  EXTREMITIES:  2+ Peripheral Pulses, No clubbing, cyanosis, or edema  LYMPH: No lymphadenopathy noted  SKIN: No rashes or lesions  ENDOCRINOLOGY: No Thyromegaly  PSYCH: Appropriate    Labs:  ABG - ( 02 Sep 2017 05:12 )  pH: 7.45  /  pCO2: 42    /  pO2: 79    / HCO3: 29    / Base Excess: 4.8   /  SaO2: 95                CARDIAC MARKERS ( 31 Aug 2017 18:57 )  x     / 0.02 ng/mL / 160 U/L / x     / 4.2 ng/mL                            13.2   4.6   )-----------( 112      ( 02 Sep 2017 05:18 )             38.4                         14.8   5.1   )-----------( 136      ( 01 Sep 2017 22:43 )             43.5                         15.3   5.2   )-----------( 137      ( 01 Sep 2017 18:40 )             45.3                         14.9   5.2   )-----------( 134      ( 01 Sep 2017 05:18 )             45.4                         15.0   4.8   )-----------( 134      ( 31 Aug 2017 18:57 )             45.4                         14.9   5.0   )-----------( 154      ( 31 Aug 2017 10:54 )             45.5                         14.5   5.3   )-----------( 150      ( 30 Aug 2017 10:45 )             43.9     09-02    137  |  98  |  38<H>  ----------------------------<  116<H>  4.1   |  27  |  1.20  09-01    132<L>  |  95<L>  |  40<H>  ----------------------------<  202<H>  4.9   |  22  |  1.28  09-01    136  |  96  |  35<H>  ----------------------------<  158<H>  4.7   |  25  |  1.19  08-31    139  |  100  |  33<H>  ----------------------------<  130<H>  3.9   |  24  |  1.06  08-31    136  |  97  |  33<H>  ----------------------------<  139<H>  4.3   |  23  |  1.08  08-30    135  |  98  |  39<H>  ----------------------------<  163<H>  4.2   |  22  |  1.21  08-29    137  |  98  |  39<H>  ----------------------------<  184<H>  4.2   |  26  |  1.26    Ca    8.5      02 Sep 2017 05:18  Ca    9.1      01 Sep 2017 22:43  Ca    9.3      01 Sep 2017 05:18  Ca    8.4      31 Aug 2017 18:57  Phos  3.4     09-02  Phos  3.5     09-01  Phos  4.4     09-01  Phos  3.2     08-31  Mg     2.2     09-02  Mg     2.4     09-01  Mg     2.5     09-01  Mg     2.3     08-31    TPro  5.7<L>  /  Alb  3.1<L>  /  TBili  0.5  /  DBili  x   /  AST  50<H>  /  ALT  98<H>  /  AlkPhos  102  09-02  TPro  6.4  /  Alb  3.5  /  TBili  0.6  /  DBili  x   /  AST  58<H>  /  ALT  118<H>  /  AlkPhos  121<H>  09-01  TPro  x   /  Alb  x   /  TBili  x   /  DBili  0.2  /  AST  x   /  ALT  x   /  AlkPhos  x   09-01  TPro  6.7  /  Alb  3.6  /  TBili  0.6  /  DBili  x   /  AST  73<H>  /  ALT  147<H>  /  AlkPhos  127<H>  09-01  TPro  6.1  /  Alb  3.3  /  TBili  0.4  /  DBili  x   /  AST  72<H>  /  ALT  147<H>  /  AlkPhos  121<H>  08-31    CAPILLARY BLOOD GLUCOSE          LIVER FUNCTIONS - ( 02 Sep 2017 05:18 )  Alb: 3.1 g/dL / Pro: 5.7 g/dL / ALK PHOS: 102 U/L / ALT: 98 U/L RC / AST: 50 U/L / GGT: x           PTT - ( 31 Aug 2017 10:54 )  PTT:> 200 sec    Lactate, Blood: 2.9 mmol/L (09-01 @ 13:04)  Cultures:                             Studies  Chest X-RAY  CT SCAN Chest   Venous Dopplers: LE:   CT Abdomen  Others Patient is a 62y old  Male who presents with a chief complaint of SOB (25 Aug 2017 22:27)    currently not sob  no chest pain      Any change in ROS:     MEDICATIONS  (STANDING):  pantoprazole    Tablet 40 milliGRAM(s) Oral two times a day before meals  sucralfate suspension 1 Gram(s) Oral four times a day  atorvastatin 20 milliGRAM(s) Oral at bedtime  tiotropium 18 MICROgram(s) Capsule 1 Capsule(s) Inhalation daily  multivitamin 1 Tablet(s) Oral daily  heparin  Injectable 5000 Unit(s) SubCutaneous every 12 hours  buDESOnide 160 MICROgram(s)/formoterol 4.5 MICROgram(s) Inhaler 2 Puff(s) Inhalation two times a day  nitroprusside Infusion 0.75 MICROgram(s)/kG/Min (6.75 mL/Hr) IV Continuous <Continuous>    MEDICATIONS  (PRN):  ondansetron Injectable 4 milliGRAM(s) IV Push every 6 hours PRN Nausea and/or Vomiting    Vital Signs Last 24 Hrs  T(C): 36.6 (02 Sep 2017 09:00), Max: 36.7 (01 Sep 2017 17:00)  T(F): 97.9 (02 Sep 2017 09:00), Max: 98.1 (01 Sep 2017 17:00)  HR: 100 (02 Sep 2017 10:00) (86 - 106)  BP: 92/71 (01 Sep 2017 17:00) (89/70 - 100/74)  BP(mean): 84 (01 Sep 2017 17:00) (76 - 85)  RR: 20 (02 Sep 2017 10:00) (8 - 30)  SpO2: 96% (02 Sep 2017 10:00) (90% - 99%)    I&O's Summary    01 Sep 2017 07:01  -  02 Sep 2017 07:00  --------------------------------------------------------  IN: 782.1 mL / OUT: 330 mL / NET: 452.1 mL    02 Sep 2017 07:01  -  02 Sep 2017 10:42  --------------------------------------------------------  IN: 235.4 mL / OUT: 200 mL / NET: 35.4 mL          Physical Exam:   GENERAL: NAD, well-groomed, well-developed  HEENT: ALONSO/   Atraumatic, Normocephalic  ENMT: No tonsillar erythema, exudates, or enlargement; Moist mucous membranes, Good dentition, No lesions  NECK: Supple, No JVD, Normal thyroid  CHEST/LUNG: dereased ir entry bilaterally  CVS: Regular rate and rhythm; No murmurs, rubs, or gallops  GI: : Soft, Nontender, Nondistended; Bowel sounds present  NERVOUS SYSTEM:  Alert & Oriented X3  EXTREMITIES:  2+ Peripheral Pulses, No clubbing, cyanosis, or edema  LYMPH: No lymphadenopathy noted  SKIN: No rashes or lesions  ENDOCRINOLOGY: No Thyromegaly  PSYCH: Appropriate    Labs:  ABG - ( 02 Sep 2017 05:12 )  pH: 7.45  /  pCO2: 42    /  pO2: 79    / HCO3: 29    / Base Excess: 4.8   /  SaO2: 95                CARDIAC MARKERS ( 31 Aug 2017 18:57 )  x     / 0.02 ng/mL / 160 U/L / x     / 4.2 ng/mL                            13.2   4.6   )-----------( 112      ( 02 Sep 2017 05:18 )             38.4                         14.8   5.1   )-----------( 136      ( 01 Sep 2017 22:43 )             43.5                         15.3   5.2   )-----------( 137      ( 01 Sep 2017 18:40 )             45.3                         14.9   5.2   )-----------( 134      ( 01 Sep 2017 05:18 )             45.4                         15.0   4.8   )-----------( 134      ( 31 Aug 2017 18:57 )             45.4                         14.9   5.0   )-----------( 154      ( 31 Aug 2017 10:54 )             45.5                         14.5   5.3   )-----------( 150      ( 30 Aug 2017 10:45 )             43.9     09-02    137  |  98  |  38<H>  ----------------------------<  116<H>  4.1   |  27  |  1.20  09-01    132<L>  |  95<L>  |  40<H>  ----------------------------<  202<H>  4.9   |  22  |  1.28  09-01    136  |  96  |  35<H>  ----------------------------<  158<H>  4.7   |  25  |  1.19  08-31    139  |  100  |  33<H>  ----------------------------<  130<H>  3.9   |  24  |  1.06  08-31    136  |  97  |  33<H>  ----------------------------<  139<H>  4.3   |  23  |  1.08  08-30    135  |  98  |  39<H>  ----------------------------<  163<H>  4.2   |  22  |  1.21  08-29    137  |  98  |  39<H>  ----------------------------<  184<H>  4.2   |  26  |  1.26    Ca    8.5      02 Sep 2017 05:18  Ca    9.1      01 Sep 2017 22:43  Ca    9.3      01 Sep 2017 05:18  Ca    8.4      31 Aug 2017 18:57  Phos  3.4     09-02  Phos  3.5     09-01  Phos  4.4     09-01  Phos  3.2     08-31  Mg     2.2     09-02  Mg     2.4     09-01  Mg     2.5     09-01  Mg     2.3     08-31    TPro  5.7<L>  /  Alb  3.1<L>  /  TBili  0.5  /  DBili  x   /  AST  50<H>  /  ALT  98<H>  /  AlkPhos  102  09-02  TPro  6.4  /  Alb  3.5  /  TBili  0.6  /  DBili  x   /  AST  58<H>  /  ALT  118<H>  /  AlkPhos  121<H>  09-01  TPro  x   /  Alb  x   /  TBili  x   /  DBili  0.2  /  AST  x   /  ALT  x   /  AlkPhos  x   09-01  TPro  6.7  /  Alb  3.6  /  TBili  0.6  /  DBili  x   /  AST  73<H>  /  ALT  147<H>  /  AlkPhos  127<H>  09-01  TPro  6.1  /  Alb  3.3  /  TBili  0.4  /  DBili  x   /  AST  72<H>  /  ALT  147<H>  /  AlkPhos  121<H>  08-31    CAPILLARY BLOOD GLUCOSE          LIVER FUNCTIONS - ( 02 Sep 2017 05:18 )  Alb: 3.1 g/dL / Pro: 5.7 g/dL / ALK PHOS: 102 U/L / ALT: 98 U/L RC / AST: 50 U/L / GGT: x           PTT - ( 31 Aug 2017 10:54 )  PTT:> 200 sec    Lactate, Blood: 2.9 mmol/L (09-01 @ 13:04)  Cultures:     < from: Xray Chest 1 View AP- PORTABLE-Urgent (09.01.17 @ 21:05) >    FINDINGS:    Gilsum-Tariq catheter entering from right IJ, with distal tip ending in the  right pulmonary artery.    Unchanged mild pulmonary vascular congestion.   No pneumothorax. No pleural effusions.  There is no pneumothorax. No significantpleural effusions.  The cardiomediastinal silhouette cannot be adequately assessed in this   projection.    IMPRESSION:   Unchanged mild pulmonary vascular congestion.     < end of copied text >                          Studies  Chest X-RAY  CT SCAN Chest   Venous Dopplers: LE:   CT Abdomen  Others    < from: Xray Chest 1 View AP- PORTABLE-Urgent (09.02.17 @ 08:58) >  TECHNIQUE: Frontal chest x-ray on September 2, 2017 at 9:00 AM    COMPARISON: Chest x-ray on September 1, 2017.     FINDINGS:    Gilsum-Tariq catheter entering from right IJ, with distal tip ending in the   right pulmonary artery.    Unchanged mild pulmonary vascular congestion. No pneumothorax. No pleural   effusions.  There is no pneumothorax or pleural effusions.   The cardiomediastinal silhouette cannot be adequately assessed in this   projection.    IMPRESSION:   Gilsum-Tariq catheter entering from right-sided IJ, with distal tip ending   in the right pulmonary artery.    < end of copied text >

## 2017-09-02 NOTE — PROGRESS NOTE ADULT - ATTENDING COMMENTS
Mr. Quispe is a 61 yo M with systolic CHF    Plan:  1. On nipride with improvement of afterload filling pressure.  2. should increase afterload reduction so by AM can titrate off hydralazine.

## 2017-09-02 NOTE — PROGRESS NOTE ADULT - ASSESSMENT
Mr. 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 He is currently stabilized on nitroprusside with significant improvement in cardiac output and normalization of his filling pressures. We have initiated an LVAD evaluation given his advanced disease.

## 2017-09-02 NOTE — PROGRESS NOTE ADULT - ASSESSMENT
63 y/o man with a NIDCM with severely reduced LV systolic function, ACC/AHA stage D with NYHA class IV symptoms admitted with cardiogenic shock stabilized on nitroprusside with improvement in cardiac output.

## 2017-09-02 NOTE — PROGRESS NOTE ADULT - PROBLEM SELECTOR PLAN 2
on IV lasix: His pleural effusions are likely due to CHF   Continue diuresis  no LV thrombus on MRI : has poor ejection fraction  evaluation for life vest per cardiology  monitor in CCU on nitroprusside drip for cardiogenic shock: BP is ok   monitor in CCU

## 2017-09-02 NOTE — PROGRESS NOTE ADULT - PROBLEM SELECTOR PLAN 1
For now we will continue the nitroprusside at 0.75 mcg/kg/min. If he remains stable on this dose, we will exchange for oral vasodilators  He is currently euvolemic and does not need IV diuretics

## 2017-09-02 NOTE — PROGRESS NOTE ADULT - SUBJECTIVE AND OBJECTIVE BOX
Patient is a 62y old  Male who presents with a chief complaint of SOOB (25 Aug 2017 22:27)    HPI:  62M with no PMHx here with chest pain. Began last week, described as pleuritic, worsens with cough and deep inspiration. Has been having SOB as well, dry cough. Recently vacationed in [facility] came back today. Saw doctor in [facility] but unclear what workup was. No fever, sick contacts, abd pain. CP described as midsternal, nonradiating. Has 25 pack year smoking history.  CTPA does not reveal PE, Pulm congestion noted. (25 Aug 2017 22:27)    Overnight Event:    REVIEW OF SYSTEMS:  	    MEDICATIONS  (STANDING):  pantoprazole    Tablet 40 milliGRAM(s) Oral two times a day before meals  sucralfate suspension 1 Gram(s) Oral four times a day  aspirin  chewable 81 milliGRAM(s) Oral daily  atorvastatin 20 milliGRAM(s) Oral at bedtime  tiotropium 18 MICROgram(s) Capsule 1 Capsule(s) Inhalation daily  multivitamin 1 Tablet(s) Oral daily  heparin  Injectable 5000 Unit(s) SubCutaneous every 12 hours  buDESOnide 160 MICROgram(s)/formoterol 4.5 MICROgram(s) Inhaler 2 Puff(s) Inhalation two times a day  nitroprusside Infusion 0.75 MICROgram(s)/kG/Min (6.75 mL/Hr) IV Continuous <Continuous>    MEDICATIONS  (PRN):  ondansetron Injectable 4 milliGRAM(s) IV Push every 6 hours PRN Nausea and/or Vomiting        PHYSICAL EXAM:  Vital Signs Last 24 Hrs  T(C): 36.5 (02 Sep 2017 06:30), Max: 36.7 (01 Sep 2017 17:00)  T(F): 97.7 (02 Sep 2017 06:30), Max: 98.1 (01 Sep 2017 17:00)  HR: 100 (02 Sep 2017 07:00) (86 - 106)  BP: 92/71 (01 Sep 2017 17:00) (87/71 - 100/74)  BP(mean): 84 (01 Sep 2017 17:00) (76 - 86)  RR: 18 (02 Sep 2017 07:00) (8 - 30)  SpO2: 93% (02 Sep 2017 07:00) (90% - 99%)  I&O's Summary    01 Sep 2017 07:01  -  02 Sep 2017 07:00  --------------------------------------------------------  IN: 782.1 mL / OUT: 330 mL / NET: 452.1 mL        Appearance: Normal	  HEENT:   Normal oral mucosa, PERRL, EOMI	  Lymphatic: No lymphadenopathy  Cardiovascular: Normal S1 S2, No JVD, No murmurs, No edema  Respiratory: Lungs clear to auscultation	  Psychiatry: A & O x 3, Mood & affect appropriate  Gastrointestinal:  Soft, Non-tender, + BS	  Skin: No rashes, No ecchymoses, No cyanosis	  Neurologic: Non-focal  Extremities: Normal range of motion, No clubbing, cyanosis or edema  Vascular: Peripheral pulses palpable 2+ bilaterally    LABS:	 	                        13.2   4.6   )-----------( 112      ( 02 Sep 2017 05:18 )             38.4     Auto Eosinophil # 0.1   / Auto Eosinophil % 1.4   / Auto Neutrophil # 2.3   / Auto Neutrophil % 50.8  / BANDS % x                            14.8   5.1   )-----------( 136      ( 01 Sep 2017 22:43 )             43.5     Auto Eosinophil # x     / Auto Eosinophil % x     / Auto Neutrophil # x     / Auto Neutrophil % x     / BANDS % x                            15.3   5.2   )-----------( 137      ( 01 Sep 2017 18:40 )             45.3     Auto Eosinophil # 0.1   / Auto Eosinophil % x     / Auto Neutrophil # 2.4   / Auto Neutrophil % 30.0  / BANDS % x          09-02    137  |  98  |  38<H>  ----------------------------<  116<H>  4.1   |  27  |  1.20  09-01    132<L>  |  95<L>  |  40<H>  ----------------------------<  202<H>  4.9   |  22  |  1.28  09-01    136  |  96  |  35<H>  ----------------------------<  158<H>  4.7   |  25  |  1.19    Ca    8.5      02 Sep 2017 05:18  Mg     2.2     09-02  Phos  3.4     09-02  TPro  5.7<L>  /  Alb  3.1<L>  /  TBili  0.5  /  DBili  x   /  AST  50<H>  /  ALT  98<H>  /  AlkPhos  102  09-02  TPro  6.4  /  Alb  3.5  /  TBili  0.6  /  DBili  x   /  AST  58<H>  /  ALT  118<H>  /  AlkPhos  121<H>  09-01  TPro  x   /  Alb  x   /  TBili  x   /  DBili  0.2  /  AST  x   /  ALT  x   /  AlkPhos  x   09-01    Lactate, Blood: 2.9 mmol/L (09-01 @ 13:04)      proBNP:   Lipid Profile: 08-26 Chol 185  HDL 49 Trig 67  HgA1c: 6.2 % (09-01 @ 00:08)    TSH: Thyroid Stimulating Hormone, Serum: 4.92 uIU/mL (09-01 @ 20:17)      CARDIAC MARKERS:   31 Aug 2017 18:57 Troponin 0.02 ng/mL / Creatine Kinase 160 U/L /  CKMB 4.2 ng/mL / CPK Mass Assay % 2.6 %   31 Aug 2017 06:29 Troponin <0.01 ng/mL / Creatine Kinase 161 U/L /  CKMB 2.4 ng/mL / CPK Mass Assay % 1.5 %   28 Aug 2017 23:57 Troponin <0.01 ng/mL / Creatine Kinase 127 U/L /  CKMB 3.7 ng/mL / CPK Mass Assay % 2.9 %        TELEMETRY: 	    ECG:  	  RADIOLOGY:  OTHER: 	    PREVIOUS DIAGNOSTIC TESTING:    [ ] Echocardiogram:  [ ]  Catheterization:  [ ] Stress Test:  	  	  [facility]  Contact # [phone] Patient is a 62y old  Male who presents with a chief complaint of SOOB (25 Aug 2017 22:27)    HPI:  62M with no PMHx here with chest pain. Began last week, described as pleuritic, worsens with cough and deep inspiration. Has been having SOB as well, dry cough. Recently vacationed in [facility] came back today. Saw doctor in [facility] but unclear what workup was. No fever, sick contacts, abd pain. CP described as midsternal, nonradiating. Has 25 pack year smoking history.  CTPA does not reveal PE, Pulm congestion noted. (25 Aug 2017 22:27)    Overnight Event: started on nipride    REVIEW OF SYSTEMS: Some pain at [facility] site  	    MEDICATIONS  (STANDING):  pantoprazole    Tablet 40 milliGRAM(s) Oral two times a day before meals  sucralfate suspension 1 Gram(s) Oral four times a day  aspirin  chewable 81 milliGRAM(s) Oral daily  atorvastatin 20 milliGRAM(s) Oral at bedtime  tiotropium 18 MICROgram(s) Capsule 1 Capsule(s) Inhalation daily  multivitamin 1 Tablet(s) Oral daily  heparin  Injectable 5000 Unit(s) SubCutaneous every 12 hours  buDESOnide 160 MICROgram(s)/formoterol 4.5 MICROgram(s) Inhaler 2 Puff(s) Inhalation two times a day  nitroprusside Infusion 0.75 MICROgram(s)/kG/Min (6.75 mL/Hr) IV Continuous <Continuous>    MEDICATIONS  (PRN):  ondansetron Injectable 4 milliGRAM(s) IV Push every 6 hours PRN Nausea and/or Vomiting        PHYSICAL EXAM:  Vital Signs Last 24 Hrs  T(C): 36.5 (02 Sep 2017 06:30), Max: 36.7 (01 Sep 2017 17:00)  T(F): 97.7 (02 Sep 2017 06:30), Max: 98.1 (01 Sep 2017 17:00)  HR: 100 (02 Sep 2017 07:00) (86 - 106)  BP: 92/71 (01 Sep 2017 17:00) (87/71 - 100/74)  BP(mean): 84 (01 Sep 2017 17:00) (76 - 86)  RR: 18 (02 Sep 2017 07:00) (8 - 30)  SpO2: 93% (02 Sep 2017 07:00) (90% - 99%)  I&O's Summary    01 Sep 2017 07:01  -  02 Sep 2017 07:00  --------------------------------------------------------  IN: 782.1 mL / OUT: 330 mL / NET: 452.1 mL        Appearance: NAD  HEENT:   Normal oral mucosa, PERRL, EOMI	  Lymphatic: No lymphadenopathy  Cardiovascular: Normal S1 S2, S3, mildly elevated JVD, No edema  Respiratory: Lungs clear to auscultation	  Psychiatry: A & O x 3, Mood & affect appropriate  Gastrointestinal:  Soft, Non-tender, + BS	  Skin: No rashes, No ecchymoses, No cyanosis	  Neurologic: Non-focal  Extremities: Normal range of motion, No clubbing, cyanosis or edema  Vascular: Peripheral pulses palpable 2+ bilaterally    LABS:	 	                        13.2   4.6   )-----------( 112      ( 02 Sep 2017 05:18 )             38.4     Auto Eosinophil # 0.1   / Auto Eosinophil % 1.4   / Auto Neutrophil # 2.3   / Auto Neutrophil % 50.8  / BANDS % x                            14.8   5.1   )-----------( 136      ( 01 Sep 2017 22:43 )             43.5     Auto Eosinophil # x     / Auto Eosinophil % x     / Auto Neutrophil # x     / Auto Neutrophil % x     / BANDS % x                            15.3   5.2   )-----------( 137      ( 01 Sep 2017 18:40 )             45.3     Auto Eosinophil # 0.1   / Auto Eosinophil % x     / Auto Neutrophil # 2.4   / Auto Neutrophil % 30.0  / BANDS % x          09-02    137  |  98  |  38<H>  ----------------------------<  116<H>  4.1   |  27  |  1.20  09-01    132<L>  |  95<L>  |  40<H>  ----------------------------<  202<H>  4.9   |  22  |  1.28  09-01    136  |  96  |  35<H>  ----------------------------<  158<H>  4.7   |  25  |  1.19    Ca    8.5      02 Sep 2017 05:18  Mg     2.2     09-02  Phos  3.4     09-02  TPro  5.7<L>  /  Alb  3.1<L>  /  TBili  0.5  /  DBili  x   /  AST  50<H>  /  ALT  98<H>  /  AlkPhos  102  09-02  TPro  6.4  /  Alb  3.5  /  TBili  0.6  /  DBili  x   /  AST  58<H>  /  ALT  118<H>  /  AlkPhos  121<H>  09-01  TPro  x   /  Alb  x   /  TBili  x   /  DBili  0.2  /  AST  x   /  ALT  x   /  AlkPhos  x   09-01    Lactate, Blood: 2.9 mmol/L (09-01 @ 13:04)      proBNP:   Lipid Profile: 08-26 Chol 185  HDL 49 Trig 67  HgA1c: 6.2 % (09-01 @ 00:08)    TSH: Thyroid Stimulating Hormone, Serum: 4.92 uIU/mL (09-01 @ 20:17)      CARDIAC MARKERS:   31 Aug 2017 18:57 Troponin 0.02 ng/mL / Creatine Kinase 160 U/L /  CKMB 4.2 ng/mL / CPK Mass Assay % 2.6 %   31 Aug 2017 06:29 Troponin <0.01 ng/mL / Creatine Kinase 161 U/L /  CKMB 2.4 ng/mL / CPK Mass Assay % 1.5 %   28 Aug 2017 23:57 Troponin <0.01 ng/mL / Creatine Kinase 127 U/L /  CKMB 3.7 ng/mL / CPK Mass Assay % 2.9 %        TELEMETRY: 	    ECG:  	  RADIOLOGY:  OTHER: 	    PREVIOUS DIAGNOSTIC TESTING:    [ ] Echocardiogram:  [ ]  Catheterization:  [ ] Stress Test:  	  	  [facility]  Contact # [phone]

## 2017-09-02 NOTE — CHART NOTE - NSCHARTNOTEFT_GEN_A_CORE
====================  CCU MIDNIGHT ROUNDS  ====================    RICKY JOINT  [id number]  Patient is a 62y old  Male who presents with a chief complaint of SOOB (25 Aug 2017 22:27)    ====================  SUMMARY:  ====================  Patient is admitted for acute systolic HF s/p RHC with CO = 1.15 and CI = 0.63 and EF 10%.  Now with PAC, started on Nitoprusside drip.  ====================  NEW EVENTS:  ====================  Remains euvolemic on exam.  Nipride drip still in progress.  Cardiac index now = 1.9.  SVO2 = 54.    MEDICATIONS  (STANDING):  pantoprazole    Tablet 40 milliGRAM(s) Oral two times a day before meals  sucralfate suspension 1 Gram(s) Oral four times a day  atorvastatin 20 milliGRAM(s) Oral at bedtime  tiotropium 18 MICROgram(s) Capsule 1 Capsule(s) Inhalation daily  multivitamin 1 Tablet(s) Oral daily  heparin  Injectable 5000 Unit(s) SubCutaneous every 12 hours  buDESOnide 160 MICROgram(s)/formoterol 4.5 MICROgram(s) Inhaler 2 Puff(s) Inhalation two times a day  nitroprusside Infusion 0.75 MICROgram(s)/kG/Min (6.75 mL/Hr) IV Continuous <Continuous>    MEDICATIONS  (PRN):  ondansetron Injectable 4 milliGRAM(s) IV Push every 6 hours PRN Nausea and/or Vomiting    ====================  VITALS (Last 12 hrs):  ====================    T(C): 36.8 (09-02-17 @ 22:00), Max: 36.9 (09-02-17 @ 13:00)  T(F): 98.2 (09-02-17 @ 22:00), Max: 98.4 (09-02-17 @ 13:00)  HR: 92 (09-02-17 @ 23:00) (92 - 106)  BP: --  BP(mean): --  ABP: 96/52 (09-02-17 @ 23:00) (88/56 - 116/62)  ABP(mean): 66 (09-02-17 @ 23:00) (60 - 82)  RR: 19 (09-02-17 @ 23:00) (12 - 26)  SpO2: 96% (09-02-17 @ 23:00) (93% - 99%)  Wt(kg): --  CVP(mm Hg): 4 (09-02-17 @ 23:00) (2 - 11)  CVP(cm H2O): --  CO: --  CI: --  PA: 39/16 (09-02-17 @ 23:00) (33/15 - 52/26)  PA(mean): 26 (09-02-17 @ 23:00) (24 - 39)  PCWP: --  SVR: --  PVR: --    I&O's Summary    01 Sep 2017 07:01  -  02 Sep 2017 07:00  --------------------------------------------------------  IN: 782.1 mL / OUT: 330 mL / NET: 452.1 mL    02 Sep 2017 07:01  -  02 Sep 2017 23:02  --------------------------------------------------------  IN: 579.2 mL / OUT: 250 mL / NET: 329.2 mL    ====================  NEW LABS:  ====================    09-02    136  |  101  |  34<H>  ----------------------------<  147<H>  4.0   |  26  |  1.09    Ca    8.6      02 Sep 2017 21:13  Phos  2.3     09-02  Mg     2.0     09-02    TPro  5.8<L>  /  Alb  3.2<L>  /  TBili  0.4  /  DBili  x   /  AST  42<H>  /  ALT  85<H>  /  AlkPhos  103  09-02    ABG - ( 02 Sep 2017 21:12 )  pH: 7.46  /  pCO2: 40    /  pO2: 83    / HCO3: 28    / Base Excess: 4.6   /  SaO2: 96        ====================  PLAN:  ====================  - Monitor hemodynamics  - Monitor SVO2  - Continue Nitroprusside drip and titrate as tolerated  - Monitor I & O's and electrolytes; replete as needed  - LVAD w/u per HF  - OOB to chair as tolerated    Maria Smith CCU NP  Beeper #[id number]  Spectra # [phone]

## 2017-09-02 NOTE — PROGRESS NOTE ADULT - PROBLEM SELECTOR PLAN 2
LVAD evaluation initiated. We will attempt oral therapy, but given his severity of illness, I am sceptical that this will be sufficient.

## 2017-09-02 NOTE — PROGRESS NOTE ADULT - SUBJECTIVE AND OBJECTIVE BOX
Subjective:     Medications:  ondansetron Injectable 4 milliGRAM(s) IV Push every 6 hours PRN  pantoprazole    Tablet 40 milliGRAM(s) Oral two times a day before meals  sucralfate suspension 1 Gram(s) Oral four times a day  aspirin  chewable 81 milliGRAM(s) Oral daily  atorvastatin 20 milliGRAM(s) Oral at bedtime  tiotropium 18 MICROgram(s) Capsule 1 Capsule(s) Inhalation daily  multivitamin 1 Tablet(s) Oral daily  heparin  Injectable 5000 Unit(s) SubCutaneous every 12 hours  buDESOnide 160 MICROgram(s)/formoterol 4.5 MICROgram(s) Inhaler 2 Puff(s) Inhalation two times a day  nitroprusside Infusion 0.75 MICROgram(s)/kG/Min IV Continuous <Continuous>    Physical Exam:    Vitals:  T(C): 36.7 (17 @ 07:30), Max: 36.7 (17 @ 17:00)  HR: 100 (17 @ 08:00) (86 - 106)  BP: 92/71 (17 @ 17:00) (89/70 - 100/74)  BP(mean): 84 (17 @ 17:00) (76 - 86)  ABP: 88/54 (17 @ 08:00) (76/52 - 122/92)  ABP(mean): 66 (17 @ 08:00) (60 - 102)  RR: 18 (17 @ 07:30) (8 - 30)  SpO2: 95% (17 @ 08:00) (90% - 99%)  CO: 3.3 (17 @ 04:53) (3.3 - 3.3)  CI: 1.8 (17 @ 04:53) (1.8 - 1.8)  PA: 40/22 (17 @ 08:00) (30/16 - 149/144)  PA(mean): 30 (17 @ 08:00) (22 - 146)  SVR: 1283 (17 @ 04:53) (1283 - 1549)    Daily     Daily Weight in k.9 (02 Sep 2017 05:00)    I&O's Summary    01 Sep 2017 07:01  -  02 Sep 2017 07:00  --------------------------------------------------------  IN: 782.1 mL / OUT: 330 mL / NET: 452.1 mL      General: No distress. Comfortable.  HEENT: EOM intact.  Neck: Neck supple. JVP not elevated. No masses  Chest: Clear to auscultation bilaterally  CV: Normal S1 and S2. No murmurs, rub, or gallops. Radial pulses normal.  Abdomen: Soft, non-distended, non-tender  Skin: No rashes or skin breakdown  Neurology: Alert and oriented times three. Sensation intact  Psych: Affect normal    Labs:                        13.2   4.6   )-----------( 112      ( 02 Sep 2017 05:18 )             38.4         137  |  98  |  38<H>  ----------------------------<  116<H>  4.1   |  27  |  1.20    Ca    8.5      02 Sep 2017 05:18  Phos  3.4       Mg     2.2         TPro  5.7<L>  /  Alb  3.1<L>  /  TBili  0.5  /  DBili  x   /  AST  50<H>  /  ALT  98<H>  /  AlkPhos  102  02    PTT - ( 31 Aug 2017 10:54 )  PTT:> 200 sec  CARDIAC MARKERS ( 31 Aug 2017 18:57 )  x     / 0.02 ng/mL / 160 U/L / x     / 4.2 ng/mL    Oxygen Saturation, Mixed: 63 % ( @ 05:12)  Oxygen Saturation, Mixed: 47 % ( @ 22:36)  Oxygen Saturation, Mixed: 38 % ( @ 18:37)    Lactate, Blood: 2.9 mmol/L ( @ 13:04) Subjective: He currently complains of pain at the site of the PA catheter insertion. He has no abdominal pain and no dyspnea. He was able to sleep last night.     Medications:  ondansetron Injectable 4 milliGRAM(s) IV Push every 6 hours PRN  pantoprazole    Tablet 40 milliGRAM(s) Oral two times a day before meals  sucralfate suspension 1 Gram(s) Oral four times a day  aspirin  chewable 81 milliGRAM(s) Oral daily  atorvastatin 20 milliGRAM(s) Oral at bedtime  tiotropium 18 MICROgram(s) Capsule 1 Capsule(s) Inhalation daily  multivitamin 1 Tablet(s) Oral daily  heparin  Injectable 5000 Unit(s) SubCutaneous every 12 hours  buDESOnide 160 MICROgram(s)/formoterol 4.5 MICROgram(s) Inhaler 2 Puff(s) Inhalation two times a day  nitroprusside Infusion 0.75 MICROgram(s)/kG/Min IV Continuous <Continuous>    Physical Exam:    Vitals:  T(C): 36.7 (17 @ 07:30), Max: 36.7 (17 @ 17:00)  HR: 100 (17 @ 08:00) (86 - 106)  BP: 92/71 (17 @ 17:00) (89/70 - 100/74)  BP(mean): 84 (17 @ 17:00) (76 - 86)  ABP: 88/54 (17 @ 08:00) (76/52 - 122/92)  ABP(mean): 66 (17 @ 08:00) (60 - 102)  RR: 18 (17 @ 07:30) (8 - 30)  SpO2: 95% (17 @ 08:00) (90% - 99%)  CO: 3.3 (17 @ 04:53) (3.3 - 3.3)  CI: 1.8 (17 @ 04:53) (1.8 - 1.8)  PA: 40/22 (17 @ 08:00) (30/16 - 149/144)  PA(mean): 30 (17 @ 08:00) (22 - 146)  SVR: 1283 (17 @ 04:53) (2003 - 1549)    Daily     Daily Weight in k.9 (02 Sep 2017 05:00)    I&O's Summary    01 Sep 2017 07:01  -  02 Sep 2017 07:00  --------------------------------------------------------  IN: 782.1 mL / OUT: 330 mL / NET: 452.1 mL      General: No distress. Comfortable.  HEENT: EOM intact.  Neck: Neck supple. JVP mildly elevated. No masses  Chest: Clear to auscultation bilaterally  CV: Normal S1 and S2 with positive S3 heard at apex. No rubs. Radial pulses normal and improved from yesterday.  Abdomen: Soft, non-distended, non-tender  Skin: No rashes or skin breakdown  Neurology: Alert and oriented times three. Sensation intact  Psych: Affect withdrawn.    Labs:                        13.2   4.6   )-----------( 112      ( 02 Sep 2017 05:18 )             38.4         137  |  98  |  38<H>  ----------------------------<  116<H>  4.1   |  27  |  1.20    Ca    8.5      02 Sep 2017 05:18  Phos  3.4       Mg     2.2         TPro  5.7<L>  /  Alb  3.1<L>  /  TBili  0.5  /  DBili  x   /  AST  50<H>  /  ALT  98<H>  /  AlkPhos  102      PTT - ( 31 Aug 2017 10:54 )  PTT:> 200 sec  CARDIAC MARKERS ( 31 Aug 2017 18:57 )  x     / 0.02 ng/mL / 160 U/L / x     / 4.2 ng/mL    Oxygen Saturation, Mixed: 63 % ( @ 05:12)  Oxygen Saturation, Mixed: 47 % ( @ 22:36)  Oxygen Saturation, Mixed: 38 % ( @ 18:37)    Lactate, Blood: 2.9 mmol/L ( @ 13:04)

## 2017-09-03 DIAGNOSIS — I51.9 HEART DISEASE, UNSPECIFIED: ICD-10-CM

## 2017-09-03 DIAGNOSIS — R10.84 GENERALIZED ABDOMINAL PAIN: ICD-10-CM

## 2017-09-03 LAB
ALBUMIN SERPL ELPH-MCNC: 3.2 G/DL — LOW (ref 3.3–5)
ALP SERPL-CCNC: 108 U/L — SIGNIFICANT CHANGE UP (ref 40–120)
ALT FLD-CCNC: 85 U/L RC — HIGH (ref 10–45)
ANION GAP SERPL CALC-SCNC: 11 MMOL/L — SIGNIFICANT CHANGE UP (ref 5–17)
AST SERPL-CCNC: 40 U/L — SIGNIFICANT CHANGE UP (ref 10–40)
BASE EXCESS BLDMV CALC-SCNC: 3.3 MMOL/L — HIGH (ref -3–3)
BASE EXCESS BLDMV CALC-SCNC: 3.4 MMOL/L — HIGH (ref -3–3)
BASE EXCESS BLDMV CALC-SCNC: 3.5 MMOL/L — HIGH (ref -3–3)
BASOPHILS # BLD AUTO: 0.1 K/UL — SIGNIFICANT CHANGE UP (ref 0–0.2)
BASOPHILS NFR BLD AUTO: 1.3 % — SIGNIFICANT CHANGE UP (ref 0–2)
BILIRUB SERPL-MCNC: 0.5 MG/DL — SIGNIFICANT CHANGE UP (ref 0.2–1.2)
BUN SERPL-MCNC: 29 MG/DL — HIGH (ref 7–23)
CALCIUM SERPL-MCNC: 8.5 MG/DL — SIGNIFICANT CHANGE UP (ref 8.4–10.5)
CHLORIDE SERPL-SCNC: 103 MMOL/L — SIGNIFICANT CHANGE UP (ref 96–108)
CK MB CFR SERPL CALC: 2.2 NG/ML — SIGNIFICANT CHANGE UP (ref 0–6.7)
CK SERPL-CCNC: 90 U/L — SIGNIFICANT CHANGE UP (ref 30–200)
CO2 BLDMV-SCNC: 29 MMOL/L — SIGNIFICANT CHANGE UP (ref 21–29)
CO2 SERPL-SCNC: 25 MMOL/L — SIGNIFICANT CHANGE UP (ref 22–31)
CREAT SERPL-MCNC: 0.87 MG/DL — SIGNIFICANT CHANGE UP (ref 0.5–1.3)
EOSINOPHIL # BLD AUTO: 0.2 K/UL — SIGNIFICANT CHANGE UP (ref 0–0.5)
EOSINOPHIL NFR BLD AUTO: 3.6 % — SIGNIFICANT CHANGE UP (ref 0–6)
GAS PNL BLDA: SIGNIFICANT CHANGE UP
GAS PNL BLDMV: SIGNIFICANT CHANGE UP
GLUCOSE SERPL-MCNC: 110 MG/DL — HIGH (ref 70–99)
HCO3 BLDMV-SCNC: 28 MMOL/L — SIGNIFICANT CHANGE UP (ref 20–28)
HCT VFR BLD CALC: 40 % — SIGNIFICANT CHANGE UP (ref 39–50)
HCT VFR BLD CALC: 43 % — SIGNIFICANT CHANGE UP (ref 39–50)
HGB BLD-MCNC: 13.2 G/DL — SIGNIFICANT CHANGE UP (ref 13–17)
HGB BLD-MCNC: 14.1 G/DL — SIGNIFICANT CHANGE UP (ref 13–17)
HOROWITZ INDEX BLDMV+IHG-RTO: 21 — SIGNIFICANT CHANGE UP
HOROWITZ INDEX BLDMV+IHG-RTO: 21 — SIGNIFICANT CHANGE UP
LYMPHOCYTES # BLD AUTO: 1.4 K/UL — SIGNIFICANT CHANGE UP (ref 1–3.3)
LYMPHOCYTES # BLD AUTO: 25.4 % — SIGNIFICANT CHANGE UP (ref 13–44)
MAGNESIUM SERPL-MCNC: 2 MG/DL — SIGNIFICANT CHANGE UP (ref 1.6–2.6)
MCHC RBC-ENTMCNC: 32.6 PG — SIGNIFICANT CHANGE UP (ref 27–34)
MCHC RBC-ENTMCNC: 32.7 PG — SIGNIFICANT CHANGE UP (ref 27–34)
MCHC RBC-ENTMCNC: 32.8 GM/DL — SIGNIFICANT CHANGE UP (ref 32–36)
MCHC RBC-ENTMCNC: 33.1 GM/DL — SIGNIFICANT CHANGE UP (ref 32–36)
MCV RBC AUTO: 98.5 FL — SIGNIFICANT CHANGE UP (ref 80–100)
MCV RBC AUTO: 99.8 FL — SIGNIFICANT CHANGE UP (ref 80–100)
MONOCYTES # BLD AUTO: 0.6 K/UL — SIGNIFICANT CHANGE UP (ref 0–0.9)
MONOCYTES NFR BLD AUTO: 11.6 % — SIGNIFICANT CHANGE UP (ref 2–14)
NEUTROPHILS # BLD AUTO: 3.2 K/UL — SIGNIFICANT CHANGE UP (ref 1.8–7.4)
NEUTROPHILS NFR BLD AUTO: 58.1 % — SIGNIFICANT CHANGE UP (ref 43–77)
O2 CT VFR BLD CALC: 30 MMHG — SIGNIFICANT CHANGE UP (ref 30–65)
O2 CT VFR BLD CALC: 34 MMHG — SIGNIFICANT CHANGE UP (ref 30–65)
O2 CT VFR BLD CALC: 42 MMHG — SIGNIFICANT CHANGE UP (ref 30–65)
PCO2 BLDMV: 43 MMHG — SIGNIFICANT CHANGE UP (ref 30–65)
PCO2 BLDMV: 44 MMHG — SIGNIFICANT CHANGE UP (ref 30–65)
PCO2 BLDMV: 45 MMHG — SIGNIFICANT CHANGE UP (ref 30–65)
PH BLDMV: 7.41 — SIGNIFICANT CHANGE UP (ref 7.32–7.45)
PH BLDMV: 7.42 — SIGNIFICANT CHANGE UP (ref 7.32–7.45)
PH BLDMV: 7.42 — SIGNIFICANT CHANGE UP (ref 7.32–7.45)
PHOSPHATE SERPL-MCNC: 2.4 MG/DL — LOW (ref 2.5–4.5)
PLATELET # BLD AUTO: 110 K/UL — LOW (ref 150–400)
PLATELET # BLD AUTO: 110 K/UL — LOW (ref 150–400)
POTASSIUM SERPL-MCNC: 4.1 MMOL/L — SIGNIFICANT CHANGE UP (ref 3.5–5.3)
POTASSIUM SERPL-SCNC: 4.1 MMOL/L — SIGNIFICANT CHANGE UP (ref 3.5–5.3)
PROT SERPL-MCNC: 6 G/DL — SIGNIFICANT CHANGE UP (ref 6–8.3)
RBC # BLD: 4.06 M/UL — LOW (ref 4.2–5.8)
RBC # BLD: 4.3 M/UL — SIGNIFICANT CHANGE UP (ref 4.2–5.8)
RBC # FLD: 14 % — SIGNIFICANT CHANGE UP (ref 10.3–14.5)
RBC # FLD: 14.1 % — SIGNIFICANT CHANGE UP (ref 10.3–14.5)
SAO2 % BLDMV: 48 % — LOW (ref 60–90)
SAO2 % BLDMV: 56 % — LOW (ref 60–90)
SAO2 % BLDMV: 71 % — SIGNIFICANT CHANGE UP (ref 60–90)
SODIUM SERPL-SCNC: 139 MMOL/L — SIGNIFICANT CHANGE UP (ref 135–145)
TROPONIN T SERPL-MCNC: 0.47 NG/ML — HIGH (ref 0–0.06)
WBC # BLD: 5.4 K/UL — SIGNIFICANT CHANGE UP (ref 3.8–10.5)
WBC # BLD: 5.5 K/UL — SIGNIFICANT CHANGE UP (ref 3.8–10.5)
WBC # FLD AUTO: 5.4 K/UL — SIGNIFICANT CHANGE UP (ref 3.8–10.5)
WBC # FLD AUTO: 5.5 K/UL — SIGNIFICANT CHANGE UP (ref 3.8–10.5)

## 2017-09-03 PROCEDURE — 99233 SBSQ HOSP IP/OBS HIGH 50: CPT

## 2017-09-03 PROCEDURE — 93010 ELECTROCARDIOGRAM REPORT: CPT

## 2017-09-03 RX ORDER — HYDRALAZINE HCL 50 MG
10 TABLET ORAL EVERY 8 HOURS
Qty: 0 | Refills: 0 | Status: DISCONTINUED | OUTPATIENT
Start: 2017-09-03 | End: 2017-09-04

## 2017-09-03 RX ORDER — HYDRALAZINE HCL 50 MG
10 TABLET ORAL ONCE
Qty: 0 | Refills: 0 | Status: COMPLETED | OUTPATIENT
Start: 2017-09-03 | End: 2017-09-03

## 2017-09-03 RX ORDER — ONDANSETRON 8 MG/1
4 TABLET, FILM COATED ORAL ONCE
Qty: 0 | Refills: 0 | Status: COMPLETED | OUTPATIENT
Start: 2017-09-03 | End: 2017-09-03

## 2017-09-03 RX ADMIN — ATORVASTATIN CALCIUM 20 MILLIGRAM(S): 80 TABLET, FILM COATED ORAL at 21:03

## 2017-09-03 RX ADMIN — HEPARIN SODIUM 5000 UNIT(S): 5000 INJECTION INTRAVENOUS; SUBCUTANEOUS at 17:55

## 2017-09-03 RX ADMIN — Medication 1 GRAM(S): at 12:06

## 2017-09-03 RX ADMIN — Medication 30 MILLILITER(S): at 12:00

## 2017-09-03 RX ADMIN — Medication 1 GRAM(S): at 05:40

## 2017-09-03 RX ADMIN — BUDESONIDE AND FORMOTEROL FUMARATE DIHYDRATE 2 PUFF(S): 160; 4.5 AEROSOL RESPIRATORY (INHALATION) at 10:23

## 2017-09-03 RX ADMIN — Medication 1 TABLET(S): at 17:56

## 2017-09-03 RX ADMIN — Medication 10 MILLIGRAM(S): at 21:03

## 2017-09-03 RX ADMIN — Medication 10 MILLIGRAM(S): at 12:36

## 2017-09-03 RX ADMIN — SODIUM NITROPRUSSIDE 9 MICROGRAM(S)/KG/MIN: 50 INJECTION INTRAVENOUS at 21:04

## 2017-09-03 RX ADMIN — Medication 1 GRAM(S): at 17:56

## 2017-09-03 RX ADMIN — SODIUM NITROPRUSSIDE 6.75 MICROGRAM(S)/KG/MIN: 50 INJECTION INTRAVENOUS at 12:21

## 2017-09-03 RX ADMIN — PANTOPRAZOLE SODIUM 40 MILLIGRAM(S): 20 TABLET, DELAYED RELEASE ORAL at 05:40

## 2017-09-03 RX ADMIN — HEPARIN SODIUM 5000 UNIT(S): 5000 INJECTION INTRAVENOUS; SUBCUTANEOUS at 05:40

## 2017-09-03 RX ADMIN — Medication 1 GRAM(S): at 23:28

## 2017-09-03 RX ADMIN — BUDESONIDE AND FORMOTEROL FUMARATE DIHYDRATE 2 PUFF(S): 160; 4.5 AEROSOL RESPIRATORY (INHALATION) at 20:15

## 2017-09-03 RX ADMIN — ONDANSETRON 4 MILLIGRAM(S): 8 TABLET, FILM COATED ORAL at 12:20

## 2017-09-03 RX ADMIN — TIOTROPIUM BROMIDE 1 CAPSULE(S): 18 CAPSULE ORAL; RESPIRATORY (INHALATION) at 15:05

## 2017-09-03 RX ADMIN — PANTOPRAZOLE SODIUM 40 MILLIGRAM(S): 20 TABLET, DELAYED RELEASE ORAL at 17:56

## 2017-09-03 RX ADMIN — Medication 1 TABLET(S): at 12:35

## 2017-09-03 RX ADMIN — ONDANSETRON 4 MILLIGRAM(S): 8 TABLET, FILM COATED ORAL at 08:08

## 2017-09-03 NOTE — PROGRESS NOTE ADULT - ASSESSMENT
Mr. 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 He is currently stabilized on nitroprusside with significant improvement in cardiac output and normalization of his filling pressures, but his cardiac output remains low. We have initiated an LVAD evaluation given his advanced disease.     I spoke with him via the [facility]ole  service.

## 2017-09-03 NOTE — PROGRESS NOTE ADULT - ASSESSMENT
63 y/o man with a NIDCM with severely reduced LV systolic function, ACC/AHA stage D with NYHA class IV symptoms admitted with cardiogenic shock stabilized on nitroprusside.

## 2017-09-03 NOTE — PROGRESS NOTE ADULT - PROBLEM SELECTOR PLAN 2
on nitroprusside drip for cardiogenic shock: BP is ok   monitor in CCU: on no diuretics: urine output reasonable

## 2017-09-03 NOTE — PROGRESS NOTE ADULT - SUBJECTIVE AND OBJECTIVE BOX
Subjective: He currently complains of abdominal burning, which has been stable. He has ongoing pain in his right neck, but it is improved from before. He has no dizziness and is not short of breath.     Medications:  ondansetron Injectable 4 milliGRAM(s) IV Push every 6 hours PRN  pantoprazole    Tablet 40 milliGRAM(s) Oral two times a day before meals  sucralfate suspension 1 Gram(s) Oral four times a day  atorvastatin 20 milliGRAM(s) Oral at bedtime  tiotropium 18 MICROgram(s) Capsule 1 Capsule(s) Inhalation daily  multivitamin 1 Tablet(s) Oral daily  heparin  Injectable 5000 Unit(s) SubCutaneous every 12 hours  buDESOnide 160 MICROgram(s)/formoterol 4.5 MICROgram(s) Inhaler 2 Puff(s) Inhalation two times a day  nitroprusside Infusion 0.75 MICROgram(s)/kG/Min IV Continuous <Continuous>  potassium acid phosphate/sodium acid phosphate tablet (K-PHOS No. 2) 1 Tablet(s) Oral three times a day before meals    Physical Exam:    Vitals:  T(C): 36.6 (09-03-17 @ 05:30), Max: 36.9 (09-02-17 @ 13:00)  HR: 104 (09-03-17 @ 09:00) (86 - 106)  ABP: 94/68 (09-03-17 @ 09:00) (80/52 - 116/62)  ABP(mean): 76 (09-03-17 @ 09:00) (60 - 84)  RR: 20 (09-03-17 @ 09:00) (12 - 26)  SpO2: 97% (09-03-17 @ 09:00) (92% - 99%)  CO: 3.2 (09-03-17 @ 04:00) (3.2 - 3.2)  CI: 1.8 (09-03-17 @ 04:00) (1.8 - 1.8)  PA: 54/27 (09-03-17 @ 09:00) (33/15 - 54/27)  PA(mean): 39 (09-03-17 @ 09:00) (24 - 39)  SVR: 1698 (09-03-17 @ 04:00) (1698 - 1698)    Daily     I&O's Summary    02 Sep 2017 07:01  -  03 Sep 2017 07:00  --------------------------------------------------------  IN: 816.4 mL / OUT: 1150 mL / NET: -333.6 mL    General: No distress. Comfortable. Cachectic and frail appearing.  HEENT: EOM intact.  Neck: Neck supple. JVP not currently elevated. No masses  Chest: Clear to auscultation bilaterally  CV: Regular rate and rhythm with normal S1 and S2. +S3. No rubs or gallops. Radial pulses normal. No peripheral edema  Abdomen: Soft, non-distended, non-tender  Skin: No rashes or skin breakdown  Neurology: Alert and oriented times three. Sensation intact  Psych: Affect normal    Labs:                        13.2   5.5   )-----------( 110      ( 03 Sep 2017 04:21 )             40.0     09-03    139  |  103  |  29<H>  ----------------------------<  110<H>  4.1   |  25  |  0.87    Ca    8.5      03 Sep 2017 04:21  Phos  2.4     09-03  Mg     2.0     09-03    TPro  6.0  /  Alb  3.2<L>  /  TBili  0.5  /  DBili  x   /  AST  40  /  ALT  85<H>  /  AlkPhos  108  09-03      Oxygen Saturation, Mixed: 56 % (09-03 @ 04:16)  Oxygen Saturation, Mixed: 54 % (09-02 @ 21:11)  Oxygen Saturation, Mixed: 58 % (09-02 @ 12:49)  Oxygen Saturation, Mixed: 63 % (09-02 @ 05:12)  Oxygen Saturation, Mixed: 47 % (09-01 @ 22:36)  Oxygen Saturation, Mixed: 38 % (09-01 @ 18:37)    Lactate, Blood: 2.9 mmol/L (09-01 @ 13:04)

## 2017-09-03 NOTE — PROGRESS NOTE ADULT - SUBJECTIVE AND OBJECTIVE BOX
Patient is a 62y old  Male who presents with a chief complaint of SOOB (25 Aug 2017 22:27)  HPI:  62M with no PMHx here with chest pain. Began last week, described as pleuritic, worsens with cough and deep inspiration. Has been having SOB as well, dry cough. Recently vacationed in [facility] came back today. Saw doctor in [facility] but unclear what workup was. No fever, sick contacts, abd pain. CP described as midsternal, nonradiating. Has 25 pack year smoking history.  CTPA does not reveal PE, Pulm congestion noted. (25 Aug 2017 22:27)      Overnight Event: C.I. decreased to 1.9    REVIEW OF SYSTEMS:  	    MEDICATIONS  (STANDING):  pantoprazole    Tablet 40 milliGRAM(s) Oral two times a day before meals  sucralfate suspension 1 Gram(s) Oral four times a day  atorvastatin 20 milliGRAM(s) Oral at bedtime  tiotropium 18 MICROgram(s) Capsule 1 Capsule(s) Inhalation daily  multivitamin 1 Tablet(s) Oral daily  heparin  Injectable 5000 Unit(s) SubCutaneous every 12 hours  buDESOnide 160 MICROgram(s)/formoterol 4.5 MICROgram(s) Inhaler 2 Puff(s) Inhalation two times a day  nitroprusside Infusion 0.75 MICROgram(s)/kG/Min (6.75 mL/Hr) IV Continuous <Continuous>  potassium acid phosphate/sodium acid phosphate tablet (K-PHOS No. 2) 1 Tablet(s) Oral three times a day before meals    MEDICATIONS  (PRN):  ondansetron Injectable 4 milliGRAM(s) IV Push every 6 hours PRN Nausea and/or Vomiting        PHYSICAL EXAM:  Vital Signs Last 24 Hrs  T(C): 36.6 (03 Sep 2017 05:30), Max: 36.9 (02 Sep 2017 13:00)  T(F): 97.9 (03 Sep 2017 05:30), Max: 98.4 (02 Sep 2017 13:00)  HR: 98 (03 Sep 2017 07:00) (86 - 106)  BP: --  BP(mean): --  RR: 17 (03 Sep 2017 07:00) (12 - 26)  SpO2: 94% (03 Sep 2017 07:00) (92% - 99%)  I&O's Summary    02 Sep 2017 07:01  -  03 Sep 2017 07:00  --------------------------------------------------------  IN: 804.6 mL / OUT: 800 mL / NET: 4.6 mL        Appearance: Normal	  HEENT:   Normal oral mucosa, PERRL, EOMI	  Lymphatic: No lymphadenopathy  Cardiovascular: Normal S1 S2, No JVD, No murmurs, No edema  Respiratory: Lungs clear to auscultation	  Psychiatry: A & O x 3, Mood & affect appropriate  Gastrointestinal:  Soft, Non-tender, + BS	  Skin: No rashes, No ecchymoses, No cyanosis	  Neurologic: Non-focal  Extremities: Normal range of motion, No clubbing, cyanosis or edema  Vascular: Peripheral pulses palpable 2+ bilaterally    LABS:	 	                        13.2   5.5   )-----------( 110      ( 03 Sep 2017 04:21 )             40.0     Auto Eosinophil # 0.2   / Auto Eosinophil % 3.6   / Auto Neutrophil # 3.2   / Auto Neutrophil % 58.1  / BANDS % x                            12.7   6.2   )-----------( 114      ( 02 Sep 2017 21:13 )             38.4     Auto Eosinophil # 0.2   / Auto Eosinophil % 3.0   / Auto Neutrophil # 3.8   / Auto Neutrophil % 61.4  / BANDS % x                            13.8   5.0   )-----------( 123      ( 02 Sep 2017 12:55 )             41.3     Auto Eosinophil # 0.1   / Auto Eosinophil % 2.6   / Auto Neutrophil # 3.0   / Auto Neutrophil % 59.7  / BANDS % x          09-03    139  |  103  |  29<H>  ----------------------------<  110<H>  4.1   |  25  |  0.87  09-02    136  |  101  |  34<H>  ----------------------------<  147<H>  4.0   |  26  |  1.09  09-02    137  |  98  |  38<H>  ----------------------------<  116<H>  4.1   |  27  |  1.20    Ca    8.5      03 Sep 2017 04:21  Mg     2.0     09-03  Phos  2.4     09-03  TPro  6.0  /  Alb  3.2<L>  /  TBili  0.5  /  DBili  x   /  AST  40  /  ALT  85<H>  /  AlkPhos  108  09-03  TPro  5.8<L>  /  Alb  3.2<L>  /  TBili  0.4  /  DBili  x   /  AST  42<H>  /  ALT  85<H>  /  AlkPhos  103  09-02  TPro  5.7<L>  /  Alb  3.1<L>  /  TBili  0.5  /  DBili  x   /  AST  50<H>  /  ALT  98<H>  /  AlkPhos  102  09-02    Lactate, Blood: 2.9 mmol/L (09-01 @ 13:04)      proBNP:   Lipid Profile: 08-26 Chol 185  HDL 49 Trig 67  HgA1c: 6.2 % (09-01 @ 00:08)    TSH: Thyroid Stimulating Hormone, Serum: 4.92 uIU/mL (09-01 @ 20:17)      CARDIAC MARKERS:   31 Aug 2017 18:57 Troponin 0.02 ng/mL / Creatine Kinase 160 U/L /  CKMB 4.2 ng/mL / CPK Mass Assay % 2.6 %   31 Aug 2017 06:29 Troponin <0.01 ng/mL / Creatine Kinase 161 U/L /  CKMB 2.4 ng/mL / CPK Mass Assay % 1.5 %   28 Aug 2017 23:57 Troponin <0.01 ng/mL / Creatine Kinase 127 U/L /  CKMB 3.7 ng/mL / CPK Mass Assay % 2.9 %        TELEMETRY: 	    ECG:  	  RADIOLOGY:  OTHER: 	    PREVIOUS DIAGNOSTIC TESTING:    [ ] Echocardiogram:  [ ]  Catheterization:  [ ] Stress Test:  	  	  [facility]  Contact #[phone]

## 2017-09-03 NOTE — PROGRESS NOTE ADULT - PROBLEM SELECTOR PLAN 4
He is not a candidate at the moment for transplant due to history of smoking and will need to be free of cigarettes for six months prior to candidacy. We will  on cessation. Wasted and cachectic, but with preserved prealbumin  We will consult nutrition and encourage increased caloric intake.

## 2017-09-03 NOTE — PROGRESS NOTE ADULT - SUBJECTIVE AND OBJECTIVE BOX
Patient is a 62y old  Male who presents with a chief complaint of SOB (25 Aug 2017 22:27)    pt is doing ok, still on nitroprusside drip  no cough  says his breathing is better  denies and fever or cough or phlegm       Any change in ROS:     MEDICATIONS  (STANDING):  pantoprazole    Tablet 40 milliGRAM(s) Oral two times a day before meals  sucralfate suspension 1 Gram(s) Oral four times a day  atorvastatin 20 milliGRAM(s) Oral at bedtime  tiotropium 18 MICROgram(s) Capsule 1 Capsule(s) Inhalation daily  multivitamin 1 Tablet(s) Oral daily  heparin  Injectable 5000 Unit(s) SubCutaneous every 12 hours  buDESOnide 160 MICROgram(s)/formoterol 4.5 MICROgram(s) Inhaler 2 Puff(s) Inhalation two times a day  nitroprusside Infusion 0.75 MICROgram(s)/kG/Min (6.75 mL/Hr) IV Continuous <Continuous>  potassium acid phosphate/sodium acid phosphate tablet (K-PHOS No. 2) 1 Tablet(s) Oral three times a day before meals  hydrALAZINE 10 milliGRAM(s) Oral every 8 hours  ondansetron Injectable 4 milliGRAM(s) IV Push once    MEDICATIONS  (PRN):  ondansetron Injectable 4 milliGRAM(s) IV Push every 6 hours PRN Nausea and/or Vomiting  aluminum hydroxide/magnesium hydroxide/simethicone Suspension 30 milliLiter(s) Oral every 4 hours PRN Dyspepsia    Vital Signs Last 24 Hrs  T(C): 36.7 (03 Sep 2017 09:30), Max: 36.9 (02 Sep 2017 13:00)  T(F): 98.1 (03 Sep 2017 09:30), Max: 98.4 (02 Sep 2017 13:00)  HR: 102 (03 Sep 2017 11:30) (86 - 108)  BP: --  BP(mean): --  RR: 20 (03 Sep 2017 11:30) (12 - 26)  SpO2: 93% (03 Sep 2017 11:30) (92% - 99%)    I&O's Summary    02 Sep 2017 07:01  -  03 Sep 2017 07:00  --------------------------------------------------------  IN: 816.4 mL / OUT: 1150 mL / NET: -333.6 mL    03 Sep 2017 07:01  -  03 Sep 2017 12:08  --------------------------------------------------------  IN: 247.2 mL / OUT: 0 mL / NET: 247.2 mL          Physical Exam:   GENERAL: NAD, well-groomed, well-developed  HEENT: ALONSO/   Atraumatic, Normocephalic  ENMT: No tonsillar erythema, exudates, or enlargement; Moist mucous membranes, Good dentition, No lesions  NECK: + JVD, Normal thyroid  CHEST/LUNG: Clear to auscultaion, ; No rales, rhonchi, wheezing, or rubs  CVS: Regular rate and rhythm; No murmurs, rubs, or gallops  GI: : Soft, Nontender, Nondistended; Bowel sounds present  NERVOUS SYSTEM:  Alert & Oriented X3  EXTREMITIES:  No edema  LYMPH: No lymphadenopathy noted  SKIN: No rashes or lesions  ENDOCRINOLOGY: No Thyromegaly  PSYCH: Appropriate    Labs:  ABG - ( 03 Sep 2017 04:16 )  pH: 7.49  /  pCO2: 33    /  pO2: 76    / HCO3: 25    / Base Excess: 2.4   /  SaO2: 96                                          13.2   5.5   )-----------( 110      ( 03 Sep 2017 04:21 )             40.0                         12.7   6.2   )-----------( 114      ( 02 Sep 2017 21:13 )             38.4                         13.8   5.0   )-----------( 123      ( 02 Sep 2017 12:55 )             41.3                         13.2   4.6   )-----------( 112      ( 02 Sep 2017 05:18 )             38.4                         14.8   5.1   )-----------( 136      ( 01 Sep 2017 22:43 )             43.5                         15.3   5.2   )-----------( 137      ( 01 Sep 2017 18:40 )             45.3                         14.9   5.2   )-----------( 134      ( 01 Sep 2017 05:18 )             45.4                         15.0   4.8   )-----------( 134      ( 31 Aug 2017 18:57 )             45.4     09-03    139  |  103  |  29<H>  ----------------------------<  110<H>  4.1   |  25  |  0.87  09-02    136  |  101  |  34<H>  ----------------------------<  147<H>  4.0   |  26  |  1.09  09-02    137  |  98  |  38<H>  ----------------------------<  116<H>  4.1   |  27  |  1.20  09-01    132<L>  |  95<L>  |  40<H>  ----------------------------<  202<H>  4.9   |  22  |  1.28  09-01    136  |  96  |  35<H>  ----------------------------<  158<H>  4.7   |  25  |  1.19  08-31    139  |  100  |  33<H>  ----------------------------<  130<H>  3.9   |  24  |  1.06  08-31    136  |  97  |  33<H>  ----------------------------<  139<H>  4.3   |  23  |  1.08    Ca    8.5      03 Sep 2017 04:21  Ca    8.6      02 Sep 2017 21:13  Ca    8.5      02 Sep 2017 05:18  Ca    9.1      01 Sep 2017 22:43  Phos  2.4     09-03  Phos  2.3     09-02  Phos  3.4     09-02  Phos  3.5     09-01  Mg     2.0     09-03  Mg     2.0     09-02  Mg     2.2     09-02  Mg     2.4     09-01    TPro  6.0  /  Alb  3.2<L>  /  TBili  0.5  /  DBili  x   /  AST  40  /  ALT  85<H>  /  AlkPhos  108  09-03  TPro  5.8<L>  /  Alb  3.2<L>  /  TBili  0.4  /  DBili  x   /  AST  42<H>  /  ALT  85<H>  /  AlkPhos  103  09-02  TPro  5.7<L>  /  Alb  3.1<L>  /  TBili  0.5  /  DBili  x   /  AST  50<H>  /  ALT  98<H>  /  AlkPhos  102  09-02  TPro  6.4  /  Alb  3.5  /  TBili  0.6  /  DBili  x   /  AST  58<H>  /  ALT  118<H>  /  AlkPhos  121<H>  09-01  TPro  x   /  Alb  x   /  TBili  x   /  DBili  0.2  /  AST  x   /  ALT  x   /  AlkPhos  x   09-01  TPro  6.7  /  Alb  3.6  /  TBili  0.6  /  DBili  x   /  AST  73<H>  /  ALT  147<H>  /  AlkPhos  127<H>  09-01    CAPILLARY BLOOD GLUCOSE          LIVER FUNCTIONS - ( 03 Sep 2017 04:21 )  Alb: 3.2 g/dL / Pro: 6.0 g/dL / ALK PHOS: 108 U/L / ALT: 85 U/L RC / AST: 40 U/L / GGT: x               Lactate, Blood: 2.9 mmol/L (09-01 @ 13:04)  Cultures:         Studies  Chest X-RAY  CT SCAN Chest   Venous Dopplers: LE:   CT Abdomen  Others      < from: Xray Chest 1 View AP- PORTABLE-Urgent (09.02.17 @ 08:58) >    INTERPRETATION:  HISTORY: Line placement, evaluate    TECHNIQUE: Frontal chest x-ray on September 2, 2017 at 9:00 AM    COMPARISON: Chest x-ray on September 1, 2017.     FINDINGS:    Prov[place]-Tariq catheter entering from right IJ, with distal tip ending in the   right pulmonary artery.    Unchanged mild pulmonary vascular congestion. No pneumothorax. No pleural   effusions.  There is no pneumothorax or pleural effusions.   The cardiomediastinal silhouette cannot be adequately assessed in this   projection.    IMPRESSION:   Providence-Tariq catheter entering from right-sided IJ, with distal tip ending   in the right pulmonary artery.                  MENDEZ TENA M.D., RADIOLOGY RESIDENT  This document has been electronically signed.  HAYLEY LEAL M.D., ATTENDING RADIOLOGIST  This document has been electronically signed. Sep  2 2017  9:20AM    < end of copied text >

## 2017-09-03 NOTE — PROGRESS NOTE ADULT - ATTENDING COMMENTS
61 yo M with NICM in acute decompensated CHF    Plan:  1. On nipride with improvement in filling pressures.  2. Add oral afterload reduction with hydralazine  3. Hold on ACEi for now.  4. Recheck hemodynamics.

## 2017-09-03 NOTE — PROGRESS NOTE ADULT - PROBLEM SELECTOR PLAN 1
For now we will continue the nitroprusside at 0.75 mcg/kg/min.   We will start hydralazine 10 mg PO TID and uptitrate as tolerated to a MAP of 65 mmHg.   He is currently euvolemic and does not need IV diuretics. Urine output is adequate

## 2017-09-04 LAB
ALBUMIN SERPL ELPH-MCNC: 3.2 G/DL — LOW (ref 3.3–5)
ALP SERPL-CCNC: 92 U/L — SIGNIFICANT CHANGE UP (ref 40–120)
ALT FLD-CCNC: 65 U/L RC — HIGH (ref 10–45)
ANION GAP SERPL CALC-SCNC: 10 MMOL/L — SIGNIFICANT CHANGE UP (ref 5–17)
AST SERPL-CCNC: 27 U/L — SIGNIFICANT CHANGE UP (ref 10–40)
BASE EXCESS BLDMV CALC-SCNC: 0.9 MMOL/L — SIGNIFICANT CHANGE UP (ref -3–3)
BASE EXCESS BLDMV CALC-SCNC: 1.3 MMOL/L — SIGNIFICANT CHANGE UP (ref -3–3)
BASE EXCESS BLDMV CALC-SCNC: 2.3 MMOL/L — SIGNIFICANT CHANGE UP (ref -3–3)
BASE EXCESS BLDMV CALC-SCNC: 3.9 MMOL/L — HIGH (ref -3–3)
BASOPHILS # BLD AUTO: 0.1 K/UL — SIGNIFICANT CHANGE UP (ref 0–0.2)
BASOPHILS NFR BLD AUTO: 1.2 % — SIGNIFICANT CHANGE UP (ref 0–2)
BILIRUB SERPL-MCNC: 0.5 MG/DL — SIGNIFICANT CHANGE UP (ref 0.2–1.2)
BUN SERPL-MCNC: 18 MG/DL — SIGNIFICANT CHANGE UP (ref 7–23)
CALCIUM SERPL-MCNC: 8.5 MG/DL — SIGNIFICANT CHANGE UP (ref 8.4–10.5)
CHLORIDE SERPL-SCNC: 103 MMOL/L — SIGNIFICANT CHANGE UP (ref 96–108)
CO2 BLDMV-SCNC: 27 MMOL/L — SIGNIFICANT CHANGE UP (ref 21–29)
CO2 BLDMV-SCNC: 27 MMOL/L — SIGNIFICANT CHANGE UP (ref 21–29)
CO2 BLDMV-SCNC: 28 MMOL/L — SIGNIFICANT CHANGE UP (ref 21–29)
CO2 BLDMV-SCNC: 30 MMOL/L — HIGH (ref 21–29)
CO2 SERPL-SCNC: 24 MMOL/L — SIGNIFICANT CHANGE UP (ref 22–31)
CREAT SERPL-MCNC: 0.85 MG/DL — SIGNIFICANT CHANGE UP (ref 0.5–1.3)
EOSINOPHIL # BLD AUTO: 0.1 K/UL — SIGNIFICANT CHANGE UP (ref 0–0.5)
EOSINOPHIL NFR BLD AUTO: 2.6 % — SIGNIFICANT CHANGE UP (ref 0–6)
GAS PNL BLDA: SIGNIFICANT CHANGE UP
GAS PNL BLDMV: SIGNIFICANT CHANGE UP
GLUCOSE SERPL-MCNC: 104 MG/DL — HIGH (ref 70–99)
HCO3 BLDMV-SCNC: 26 MMOL/L — SIGNIFICANT CHANGE UP (ref 20–28)
HCO3 BLDMV-SCNC: 26 MMOL/L — SIGNIFICANT CHANGE UP (ref 20–28)
HCO3 BLDMV-SCNC: 27 MMOL/L — SIGNIFICANT CHANGE UP (ref 20–28)
HCO3 BLDMV-SCNC: 28 MMOL/L — SIGNIFICANT CHANGE UP (ref 20–28)
HCT VFR BLD CALC: 39.1 % — SIGNIFICANT CHANGE UP (ref 39–50)
HGB BLD-MCNC: 12.9 G/DL — LOW (ref 13–17)
HOROWITZ INDEX BLDMV+IHG-RTO: 21 — SIGNIFICANT CHANGE UP
LYMPHOCYTES # BLD AUTO: 1.6 K/UL — SIGNIFICANT CHANGE UP (ref 1–3.3)
LYMPHOCYTES # BLD AUTO: 27.8 % — SIGNIFICANT CHANGE UP (ref 13–44)
MAGNESIUM SERPL-MCNC: 1.9 MG/DL — SIGNIFICANT CHANGE UP (ref 1.6–2.6)
MCHC RBC-ENTMCNC: 32.5 PG — SIGNIFICANT CHANGE UP (ref 27–34)
MCHC RBC-ENTMCNC: 32.9 GM/DL — SIGNIFICANT CHANGE UP (ref 32–36)
MCV RBC AUTO: 98.7 FL — SIGNIFICANT CHANGE UP (ref 80–100)
MONOCYTES # BLD AUTO: 0.7 K/UL — SIGNIFICANT CHANGE UP (ref 0–0.9)
MONOCYTES NFR BLD AUTO: 11.6 % — SIGNIFICANT CHANGE UP (ref 2–14)
NEUTROPHILS # BLD AUTO: 3.3 K/UL — SIGNIFICANT CHANGE UP (ref 1.8–7.4)
NEUTROPHILS NFR BLD AUTO: 56.9 % — SIGNIFICANT CHANGE UP (ref 43–77)
O2 CT VFR BLD CALC: 29 MMHG — LOW (ref 30–65)
O2 CT VFR BLD CALC: 31 MMHG — SIGNIFICANT CHANGE UP (ref 30–65)
O2 CT VFR BLD CALC: 33 MMHG — SIGNIFICANT CHANGE UP (ref 30–65)
O2 CT VFR BLD CALC: 38 MMHG — SIGNIFICANT CHANGE UP (ref 30–65)
PCO2 BLDMV: 43 MMHG — SIGNIFICANT CHANGE UP (ref 30–65)
PCO2 BLDMV: 43 MMHG — SIGNIFICANT CHANGE UP (ref 30–65)
PCO2 BLDMV: 44 MMHG — SIGNIFICANT CHANGE UP (ref 30–65)
PCO2 BLDMV: 44 MMHG — SIGNIFICANT CHANGE UP (ref 30–65)
PH BLDMV: 7.38 — SIGNIFICANT CHANGE UP (ref 7.32–7.45)
PH BLDMV: 7.4 — SIGNIFICANT CHANGE UP (ref 7.32–7.45)
PH BLDMV: 7.41 — SIGNIFICANT CHANGE UP (ref 7.32–7.45)
PH BLDMV: 7.42 — SIGNIFICANT CHANGE UP (ref 7.32–7.45)
PHOSPHATE SERPL-MCNC: 2.4 MG/DL — LOW (ref 2.5–4.5)
PLATELET # BLD AUTO: 106 K/UL — SIGNIFICANT CHANGE UP (ref 150–400)
POTASSIUM SERPL-MCNC: 4.3 MMOL/L — SIGNIFICANT CHANGE UP (ref 3.5–5.3)
POTASSIUM SERPL-SCNC: 4.3 MMOL/L — SIGNIFICANT CHANGE UP (ref 3.5–5.3)
PROT SERPL-MCNC: 5.9 G/DL — LOW (ref 6–8.3)
RBC # BLD: 3.96 M/UL — LOW (ref 4.2–5.8)
RBC # FLD: 14 % — SIGNIFICANT CHANGE UP (ref 10.3–14.5)
SAO2 % BLDMV: 47 % — LOW (ref 60–90)
SAO2 % BLDMV: 54 % — LOW (ref 60–90)
SAO2 % BLDMV: 55 % — LOW (ref 60–90)
SAO2 % BLDMV: 64 % — SIGNIFICANT CHANGE UP (ref 60–90)
SODIUM SERPL-SCNC: 137 MMOL/L — SIGNIFICANT CHANGE UP (ref 135–145)
WBC # BLD: 5.8 K/UL — SIGNIFICANT CHANGE UP (ref 3.8–10.5)
WBC # FLD AUTO: 5.8 K/UL — SIGNIFICANT CHANGE UP (ref 3.8–10.5)

## 2017-09-04 PROCEDURE — 93010 ELECTROCARDIOGRAM REPORT: CPT

## 2017-09-04 PROCEDURE — 99233 SBSQ HOSP IP/OBS HIGH 50: CPT

## 2017-09-04 RX ORDER — ACETAMINOPHEN 500 MG
1000 TABLET ORAL ONCE
Qty: 0 | Refills: 0 | Status: COMPLETED | OUTPATIENT
Start: 2017-09-04 | End: 2017-09-04

## 2017-09-04 RX ORDER — SODIUM,POTASSIUM PHOSPHATES 278-250MG
1 POWDER IN PACKET (EA) ORAL
Qty: 0 | Refills: 0 | Status: COMPLETED | OUTPATIENT
Start: 2017-09-04 | End: 2017-09-05

## 2017-09-04 RX ORDER — MAGNESIUM SULFATE 500 MG/ML
2 VIAL (ML) INJECTION ONCE
Qty: 0 | Refills: 0 | Status: COMPLETED | OUTPATIENT
Start: 2017-09-04 | End: 2017-09-04

## 2017-09-04 RX ORDER — SODIUM,POTASSIUM PHOSPHATES 278-250MG
1 POWDER IN PACKET (EA) ORAL
Qty: 0 | Refills: 0 | Status: DISCONTINUED | OUTPATIENT
Start: 2017-09-04 | End: 2017-09-04

## 2017-09-04 RX ORDER — HYDRALAZINE HCL 50 MG
37.5 TABLET ORAL EVERY 8 HOURS
Qty: 0 | Refills: 0 | Status: DISCONTINUED | OUTPATIENT
Start: 2017-09-04 | End: 2017-09-05

## 2017-09-04 RX ORDER — HYDRALAZINE HCL 50 MG
25 TABLET ORAL EVERY 8 HOURS
Qty: 0 | Refills: 0 | Status: DISCONTINUED | OUTPATIENT
Start: 2017-09-04 | End: 2017-09-04

## 2017-09-04 RX ORDER — HYDRALAZINE HCL 50 MG
10 TABLET ORAL ONCE
Qty: 0 | Refills: 0 | Status: COMPLETED | OUTPATIENT
Start: 2017-09-04 | End: 2017-09-04

## 2017-09-04 RX ADMIN — Medication 1 TABLET(S): at 17:20

## 2017-09-04 RX ADMIN — BUDESONIDE AND FORMOTEROL FUMARATE DIHYDRATE 2 PUFF(S): 160; 4.5 AEROSOL RESPIRATORY (INHALATION) at 11:28

## 2017-09-04 RX ADMIN — Medication 800 MILLIGRAM(S): at 18:32

## 2017-09-04 RX ADMIN — Medication 1 GRAM(S): at 17:20

## 2017-09-04 RX ADMIN — Medication 1 TABLET(S): at 05:51

## 2017-09-04 RX ADMIN — Medication 1 TABLET(S): at 08:26

## 2017-09-04 RX ADMIN — HEPARIN SODIUM 5000 UNIT(S): 5000 INJECTION INTRAVENOUS; SUBCUTANEOUS at 05:00

## 2017-09-04 RX ADMIN — Medication 1 TABLET(S): at 22:08

## 2017-09-04 RX ADMIN — ATORVASTATIN CALCIUM 20 MILLIGRAM(S): 80 TABLET, FILM COATED ORAL at 22:02

## 2017-09-04 RX ADMIN — Medication 25 MILLIGRAM(S): at 15:05

## 2017-09-04 RX ADMIN — Medication 10 MILLIGRAM(S): at 05:47

## 2017-09-04 RX ADMIN — HEPARIN SODIUM 5000 UNIT(S): 5000 INJECTION INTRAVENOUS; SUBCUTANEOUS at 17:20

## 2017-09-04 RX ADMIN — PANTOPRAZOLE SODIUM 40 MILLIGRAM(S): 20 TABLET, DELAYED RELEASE ORAL at 17:20

## 2017-09-04 RX ADMIN — Medication 50 GRAM(S): at 05:51

## 2017-09-04 RX ADMIN — BUDESONIDE AND FORMOTEROL FUMARATE DIHYDRATE 2 PUFF(S): 160; 4.5 AEROSOL RESPIRATORY (INHALATION) at 23:44

## 2017-09-04 RX ADMIN — Medication 1 GRAM(S): at 05:00

## 2017-09-04 RX ADMIN — Medication 1 TABLET(S): at 11:56

## 2017-09-04 RX ADMIN — Medication 10 MILLIGRAM(S): at 08:26

## 2017-09-04 RX ADMIN — Medication 1 GRAM(S): at 11:56

## 2017-09-04 RX ADMIN — PANTOPRAZOLE SODIUM 40 MILLIGRAM(S): 20 TABLET, DELAYED RELEASE ORAL at 05:00

## 2017-09-04 RX ADMIN — Medication 37.5 MILLIGRAM(S): at 22:02

## 2017-09-04 RX ADMIN — SODIUM NITROPRUSSIDE 6.3 MICROGRAM(S)/KG/MIN: 50 INJECTION INTRAVENOUS at 22:06

## 2017-09-04 RX ADMIN — Medication 1000 MILLIGRAM(S): at 18:47

## 2017-09-04 RX ADMIN — TIOTROPIUM BROMIDE 1 CAPSULE(S): 18 CAPSULE ORAL; RESPIRATORY (INHALATION) at 11:28

## 2017-09-04 RX ADMIN — Medication 30 MILLILITER(S): at 06:59

## 2017-09-04 RX ADMIN — Medication 1 GRAM(S): at 22:02

## 2017-09-04 NOTE — PROGRESS NOTE ADULT - PROBLEM SELECTOR PLAN 1
C.I--1.6 and C.O--2.8. SVR~1700  c/w nitroprusside at 1 mcg/kg/min.   Will decrease afterload and switch nitroprusside to oral as tolerated   MAP of 65 mmHg.   He is currently euvolemic and does not need IV diuretics. Urine output is adequate

## 2017-09-04 NOTE — PROGRESS NOTE ADULT - PROBLEM SELECTOR PLAN 2
on nitroprusside drip for cardiogenic shock: BP is ok   monitor in CCU: on no diuretics: hydralazine is uptitrated  LVAD per cardiology

## 2017-09-04 NOTE — PROGRESS NOTE ADULT - SUBJECTIVE AND OBJECTIVE BOX
Patient is a 62y old  Male who presents with a chief complaint of SOOB (25 Aug 2017 22:27)      SUBJECTIVE / OVERNIGHT EVENTS:  Seen in CCU. No SOB at rest. BP stable  Review of Systems:   CONSTITUTIONAL: No fever, weight loss, or fatigue  EYES: No eye pain, visual disturbances, or discharge  ENMT:  No difficulty hearing, tinnitus, vertigo; No sinus or throat pain  NECK: No pain or stiffness  BREASTS: No pain, masses, or nipple discharge  RESPIRATORY: No cough, wheezing, chills or hemoptysis; No shortness of breath  CARDIOVASCULAR: No chest pain, palpitations, dizziness, or leg swelling  GASTROINTESTINAL: No abdominal or epigastric pain. No nausea, vomiting, or hematemesis; No diarrhea or constipation. No melena or hematochezia.  GENITOURINARY: No dysuria, frequency, hematuria, or incontinence  NEUROLOGICAL: No headaches, memory loss, loss of strength, numbness, or tremors  SKIN: No itching, burning, rashes, or lesions   LYMPH NODES: No enlarged glands  ENDOCRINE: No heat or cold intolerance; No hair loss  MUSCULOSKELETAL: No joint pain or swelling; No muscle, back, or extremity pain  PSYCHIATRIC: No depression, anxiety, mood swings, or difficulty sleeping  HEME/LYMPH: No easy bruising, or bleeding gums  ALLERY AND IMMUNOLOGIC: No hives or eczema    MEDICATIONS  (STANDING):  pantoprazole    Tablet 40 milliGRAM(s) Oral two times a day before meals  sucralfate suspension 1 Gram(s) Oral four times a day  atorvastatin 20 milliGRAM(s) Oral at bedtime  tiotropium 18 MICROgram(s) Capsule 1 Capsule(s) Inhalation daily  multivitamin 1 Tablet(s) Oral daily  heparin  Injectable 5000 Unit(s) SubCutaneous every 12 hours  buDESOnide 160 MICROgram(s)/formoterol 4.5 MICROgram(s) Inhaler 2 Puff(s) Inhalation two times a day  nitroprusside Infusion 0.7 MICROgram(s)/kG/Min (6.3 mL/Hr) IV Continuous <Continuous>  potassium acid phosphate/sodium acid phosphate tablet (K-PHOS No. 2) 1 Tablet(s) Oral four times a day with meals  hydrALAZINE 25 milliGRAM(s) Oral every 8 hours  potassium acid phosphate/sodium acid phosphate tablet (K-PHOS No. 2) 1 Tablet(s) Oral four times a day with meals    MEDICATIONS  (PRN):  ondansetron Injectable 4 milliGRAM(s) IV Push every 6 hours PRN Nausea and/or Vomiting  aluminum hydroxide/magnesium hydroxide/simethicone Suspension 30 milliLiter(s) Oral every 4 hours PRN Dyspepsia      PHYSICAL EXAM:  ICU Vital Signs Last 24 Hrs  T(C): 36.9 (04 Sep 2017 12:00), Max: 37 (03 Sep 2017 18:00)  T(F): 98.4 (04 Sep 2017 12:00), Max: 98.6 (03 Sep 2017 18:00)  HR: 110 (04 Sep 2017 14:30) (78 - 114)  BP: --  BP(mean): --  ABP: 90/76 (04 Sep 2017 14:30) (76/58 - 114/72)  ABP(mean): 82 (04 Sep 2017 14:30) (64 - 84)  RR: 25 (04 Sep 2017 14:30) (16 - 27)  SpO2: 96% (04 Sep 2017 14:30) (86% - 98%)    I&O's Summary    03 Sep 2017 07:01  -  04 Sep 2017 07:00  --------------------------------------------------------  IN: 523.4 mL / OUT: 1250 mL / NET: -726.6 mL    04 Sep 2017 07:01  -  04 Sep 2017 14:49  --------------------------------------------------------  IN: 528.6 mL / OUT: 250 mL / NET: 278.6 mL      GENERAL: NAD, well-developed  HEAD:  Atraumatic, Normocephalic  EYES: EOMI, PERRLA, conjunctiva and sclera clear  NECK: Supple, No JVD  CHEST/LUNG: Clear to auscultation bilaterally; No wheeze. Very poor AE bilaterally  HEART: Regular rate and rhythm; No murmurs, rubs, or gallops  ABDOMEN: Soft, Nontender, Nondistended; Bowel sounds present  EXTREMITIES:  2+ Peripheral Pulses, No clubbing, cyanosis, or edema  PSYCH: AAOx3  NEUROLOGY: non-focal  SKIN: No rashes or lesions    LABS:  CAPILLARY BLOOD GLUCOSE                              12.9   5.8   )-----------( 106      ( 04 Sep 2017 04:30 )             39.1     09-04    137  |  103  |  18  ----------------------------<  104<H>  4.3   |  24  |  0.85    Ca    8.5      04 Sep 2017 04:30  Phos  2.4     09-04  Mg     1.9     09-04    TPro  5.9<L>  /  Alb  3.2<L>  /  TBili  0.5  /  DBili  x   /  AST  27  /  ALT  65<H>  /  AlkPhos  92  09-04      CARDIAC MARKERS ( 03 Sep 2017 16:56 )  x     / 0.47 ng/mL / 90 U/L / x     / 2.2 ng/mL          RADIOLOGY & ADDITIONAL TESTS:    Imaging Personally Reviewed:    Consultant(s) Notes Reviewed:      Care Discussed with Consultants/Other Providers:

## 2017-09-04 NOTE — PROGRESS NOTE ADULT - ASSESSMENT
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

## 2017-09-04 NOTE — PROGRESS NOTE ADULT - SUBJECTIVE AND OBJECTIVE BOX
Patient is a 62y old  Male who presents with a chief complaint of SOOB (25 Aug 2017 22:27)    HPI:  62M with no PMHx here with chest pain. Began last week, described as pleuritic, worsens with cough and deep inspiration. Has been having SOB as well, dry cough. Recently vacationed in [facility] came back today. Saw doctor in [facility] but unclear what workup was. No fever, sick contacts, abd pain. CP described as midsternal, nonradiating. Has 25 pack year smoking history.  CTPA does not reveal PE, Pulm congestion noted. (25 Aug 2017 22:27)      Overnight Event: C.I. decreased to 1.9    REVIEW OF SYSTEMS:  	    MEDICATIONS  (STANDING):  MEDICATIONS  (STANDING):  pantoprazole    Tablet 40 milliGRAM(s) Oral two times a day before meals  sucralfate suspension 1 Gram(s) Oral four times a day  atorvastatin 20 milliGRAM(s) Oral at bedtime  tiotropium 18 MICROgram(s) Capsule 1 Capsule(s) Inhalation daily  multivitamin 1 Tablet(s) Oral daily  heparin  Injectable 5000 Unit(s) SubCutaneous every 12 hours  buDESOnide 160 MICROgram(s)/formoterol 4.5 MICROgram(s) Inhaler 2 Puff(s) Inhalation two times a day  nitroprusside Infusion 1 MICROgram(s)/kG/Min (9 mL/Hr) IV Continuous <Continuous>  hydrALAZINE 10 milliGRAM(s) Oral every 8 hours  potassium acid phosphate/sodium acid phosphate tablet (K-PHOS No. 2) 1 Tablet(s) Oral four times a day with meals  potassium acid phosphate/sodium acid phosphate tablet (K-PHOS No. 2) 1 Tablet(s) Oral four times a day with meals    MEDICATIONS  (PRN):  ondansetron Injectable 4 milliGRAM(s) IV Push every 6 hours PRN Nausea and/or Vomiting  aluminum hydroxide/magnesium hydroxide/simethicone Suspension 30 milliLiter(s) Oral every 4 hours PRN Dyspepsia        PHYSICAL EXAM:    ICU Vital Signs Last 24 Hrs  T(C): 36.7 (04 Sep 2017 05:00), Max: 37 (03 Sep 2017 18:00)  T(F): 98.1 (04 Sep 2017 05:00), Max: 98.6 (03 Sep 2017 18:00)  HR: 100 (04 Sep 2017 07:00) (78 - 114)  BP: --  BP(mean): --  ABP: 102/72 (04 Sep 2017 07:00) (88/56 - 114/72)  ABP(mean): 80 (04 Sep 2017 07:00) (64 - 84)  RR: 20 (04 Sep 2017 07:00) (14 - 25)  SpO2: 96% (04 Sep 2017 07:00) (86% - 98%)    PHYSICAL EXAM:  GENERAL: NAD, well-developed  HEAD:  Atraumatic, Normocephalic  EYES: EOMI, PERRLA, conjunctiva and sclera clear  NECK: Supple, No JVD  CHEST/LUNG: Clear to auscultation bilaterally; No wheeze  HEART: Regular rate and rhythm; No murmurs, rubs, or gallops  ABDOMEN: Soft, Nontender, Nondistended; Bowel sounds present  EXTREMITIES:  2+ Peripheral Pulses, No clubbing, cyanosis, or edema  PSYCH: AAOx3  NEUROLOGY: non-focal  SKIN: No rashes or lesions    LABS:	 	                                   12.9   5.8   )-----------( 106      ( 04 Sep 2017 04:30 )             39.1     09-04    137  |  103  |  18  ----------------------------<  104<H>  4.3   |  24  |  0.85    Ca    8.5      04 Sep 2017 04:30  Phos  2.4     09-04  Mg     1.9     09-04    TPro  5.9<L>  /  Alb  3.2<L>  /  TBili  0.5  /  DBili  x   /  AST  27  /  ALT  65<H>  /  AlkPhos  92  09-04 Patient is a 62y old  Male who presents with a chief complaint of SOOB (25 Aug 2017 22:27)    HPI:  62M with no PMHx here with chest pain. Began last week, described as pleuritic, worsens with cough and deep inspiration. Has been having SOB as well, dry cough. Recently vacationed in [facility] came back today. Saw doctor in [facility] but unclear what workup was. No fever, sick contacts, abd pain. CP described as midsternal, nonradiating. Has 25 pack year smoking history.  CTPA does not reveal PE, Pulm congestion noted. (25 Aug 2017 22:27)      Overnight Event: C.I. decreased to 1.6 from 1.9--No CP, SOB or abdominal pain.     REVIEW OF SYSTEMS:    Review of Systems: GEN: no fevers, chills, no night sweats no weight loss , no swollen lymph nodes    EYES: No blurry vision , no changes in vision  ENT: no sores, no runny nose, no sore throat   PULM: no cough, no shortness of breath   CARD: no chest pain, no palpitations    GI : no abdominal pain , no nausea, no vomiting  : no burning urination, no change in color of urine, no flank pain, no blood in urine   MSK: no swelling   SKIN: no bruising or bleeding, no sores in mouth  , no yellowing of the skin  Neuro: no lightheadedness, no headaches, no weakness, no fainting, no confusion , no seizures    MEDICATIONS  (STANDING):  pantoprazole    Tablet 40 milliGRAM(s) Oral two times a day before meals  sucralfate suspension 1 Gram(s) Oral four times a day  atorvastatin 20 milliGRAM(s) Oral at bedtime  tiotropium 18 MICROgram(s) Capsule 1 Capsule(s) Inhalation daily  multivitamin 1 Tablet(s) Oral daily  heparin  Injectable 5000 Unit(s) SubCutaneous every 12 hours  buDESOnide 160 MICROgram(s)/formoterol 4.5 MICROgram(s) Inhaler 2 Puff(s) Inhalation two times a day  nitroprusside Infusion 1 MICROgram(s)/kG/Min (9 mL/Hr) IV Continuous <Continuous>  potassium acid phosphate/sodium acid phosphate tablet (K-PHOS No. 2) 1 Tablet(s) Oral four times a day with meals  potassium acid phosphate/sodium acid phosphate tablet (K-PHOS No. 2) 1 Tablet(s) Oral four times a day with meals  hydrALAZINE 25 milliGRAM(s) Oral every 8 hours    MEDICATIONS  (PRN):  ondansetron Injectable 4 milliGRAM(s) IV Push every 6 hours PRN Nausea and/or Vomiting  aluminum hydroxide/magnesium hydroxide/simethicone Suspension 30 milliLiter(s) Oral every 4 hours PRN Dyspepsia      PHYSICAL EXAM:    ICU Vital Signs Last 24 Hrs  T(C): 36.7 (04 Sep 2017 05:00), Max: 37 (03 Sep 2017 18:00)  T(F): 98.1 (04 Sep 2017 05:00), Max: 98.6 (03 Sep 2017 18:00)  HR: 100 (04 Sep 2017 07:00) (78 - 114)  BP: --  BP(mean): --  ABP: 102/72 (04 Sep 2017 07:00) (88/56 - 114/72)  ABP(mean): 80 (04 Sep 2017 07:00) (64 - 84)  RR: 20 (04 Sep 2017 07:00) (14 - 25)  SpO2: 96% (04 Sep 2017 07:00) (86% - 98%)    Is/Os: Net neg 700 cc    PHYSICAL EXAM:  GENERAL: NAD, cachectic   HEAD:  Atraumatic, Normocephalic  EYES: EOMI, PERRLA, conjunctiva and sclera clear  NECK: Supple, No JVD today   CHEST/LUNG: Clear to auscultation bilaterally; No wheeze  HEART: Regular rate and rhythm; + S1, S2, S3 No rubs, or gallops  ABDOMEN: Soft, Nontender, Nondistended; Bowel sounds present  EXTREMITIES:  2+ Peripheral Pulses, No clubbing, cyanosis, or edema. Extremities warm and perfusing well   PSYCH: AAOx3  NEUROLOGY: non-focal  SKIN: No rashes or lesions    LABS:	 	                                   12.9   5.8   )-----------( 106      ( 04 Sep 2017 04:30 )             39.1     09-04    137  |  103  |  18  ----------------------------<  104<H>  4.3   |  24  |  0.85    Ca    8.5      04 Sep 2017 04:30  Phos  2.4     09-04  Mg     1.9     09-04    TPro  5.9<L>  /  Alb  3.2<L>  /  TBili  0.5  /  DBili  x   /  AST  27  /  ALT  65<H>  /  AlkPhos  92  09-04    < from: TTE with Doppler (w/Cont) (08.28.17 @ 22:35) >  Dimensions:    Normal Values:  LA:     4.2    2.0 - 4.0 cm  Ao:     3.3    2.0 - 3.8 cm  SEPTUM: 0.7    0.6 - 1.2 cm  PWT:    0.8    0.6 - 1.1 cm  LVIDd:  6.7    3.0 - 5.6 cm  LVIDs:  6.4    1.8 - 4.0 cm  Derived variables:  LVMI: 115 g/m2  RWT: 0.23  Fractional short: 4 %  EF (Visual Estimate): 10-15 %  ------------------------------------------------------------------------  Observations:  Mitral Valve: Thickened mitral valve. Dilated mitral valve  annulus. Tethered posterior leaflet.  Severe, eccentric,  posteriorly-directed mitral regurgitation. Flow reversal is  seen in the pulmonary veins.  Aortic Valve/Aorta: Thickened aortic valve.  Aortic Root: 3.3 cm.  Left Atrium: Severely dilatedleft atrium.  LA volume index  = 63 cc/m2.  Left Ventricle: Endocardial visualization enhanced with  intravenous injection of echo contrast (Definity). Severe  global left ventricular systolic dysfunction with regional  variation (the mid to distal anterior wall and septum are  akinetic). Spontaneous echocardiographic contrast is seen  in the left ventricle. Ill-defined mass at the apex of the  left ventricle. May be left ventricular trabeculation or  thrombus. Consider MRI to further evaluate. Moderate to  severe left ventricular enlargement. E/e' equals 20,  consistent with elevated left ventricular end diastolic  pressure.  Right Heart: Moderate right atrial enlargement. Right  ventricular enlargement with decreased right ventricular  systolic function.  Diastolic septal flattening consistent  with right ventricular volume overload.  Thickened  tricuspid valve. Moderate-severe tricuspid regurgitation.  Normal pulmonic valve. Mild pulmonic regurgitation.  Pericardium/Pleura: Trace pericardial effusion.  Bilateral pleural effusions.  ------------------------------------------------------------------------  Conclusions:  1. Thickened mitral valve. Dilated mitral valve annulus.  Tethered posterior leaflet.  Severe, eccentric,  posteriorly-directed mitral regurgitation. Flow reversal is  seen in the pulmonary veins.  2. Thickened aortic valve.  3. Severely dilated left atrium.  4. Moderate to severe left ventricular enlargement.  5. Endocardial visualization enhanced with intravenous  injection of echo contrast (Definity). Severe global left  ventricular systolic dysfunction with regional variation  (the mid to distal anterior wall and septum are akinetic).  Spontaneous echocardiographic contrast is seen in the left  ventricle. Ill-defined, mass at the apex of the left  ventricle. May be left ventricular trabeculation or  thrombus. Consider MRI to further evaluate.  6. E/e' equals 20, consistent with elevated left  ventricular end diastolic pressure.  7. Moderate right atrial enlargement.  8. Right ventricular enlargement with decreased right  ventricular systolic function.  Diastolic septal flattening  consistent with right ventricular volume overload.  9. Thickened tricuspid valve. Moderate-severe tricuspid  regurgitation.  10. Trace pericardial effusion.  ------------------------------------------------------------------------  Confirmed on  8/28/2017 - 15:54:57 by Matias Stubbs M.D.  ------------------------------------------------------------------------    < end of copied text > Patient is a 62y old  Male who presents with a chief complaint of SOB (25 Aug 2017 22:27)    HPI:  Patient is a 62 years old Creole speaking Male with no no significant PMH who presented 6 days ago with SOB and chest pain. Patient states that he had no hx of heart failure or MI. He was vacationing in [facility] for the past month and his symptoms began a week prior to admission. He endorses chest pain which was substernal, non radiating and worse with exertion along with non productive cough and PND. No orthopnea or weight changes. He also endorses anorexia and weight loss. No recent sickness per se and no known sick contacts. He sought medical attention in [facility] but refused to be admitted to the hospital there. [place] back [place] the [place] on [place] and came to [facility]. Patient found to be tachycardiac and hypotensive.     During his hospital stay, patient had had a negative CTA but a newly diagnosed systolic CHF with EF 10-15% and severely dilated LA, Severe MR, TR. Patient has been on IV lasix with less than ideal urinary output. RHC done today showed PA systolic pressure of 54, LVEDP 32, CO 1.15, CI 0.63 and EF 10%. Admitted to CCU for close monitoring and diuresis. Cardiac MRI negative for LV thrombus          Overnight Event: C.I. decreased to 1.6 from 1.9--No CP, SOB or abdominal pain.     REVIEW OF SYSTEMS:    Review of Systems: GEN: no fevers, chills, no night sweats no weight loss , no swollen lymph nodes    EYES: No blurry vision , no changes in vision  ENT: no sores, no runny nose, no sore throat   PULM: no cough, no shortness of breath   CARD: no chest pain, no palpitations    GI : no abdominal pain , no nausea, no vomiting  : no burning urination, no change in color of urine, no flank pain, no blood in urine   MSK: no swelling   SKIN: no bruising or bleeding, no sores in mouth  , no yellowing of the skin  Neuro: no lightheadedness, no headaches, no weakness, no fainting, no confusion , no seizures    MEDICATIONS  (STANDING):  pantoprazole    Tablet 40 milliGRAM(s) Oral two times a day before meals  sucralfate suspension 1 Gram(s) Oral four times a day  atorvastatin 20 milliGRAM(s) Oral at bedtime  tiotropium 18 MICROgram(s) Capsule 1 Capsule(s) Inhalation daily  multivitamin 1 Tablet(s) Oral daily  heparin  Injectable 5000 Unit(s) SubCutaneous every 12 hours  buDESOnide 160 MICROgram(s)/formoterol 4.5 MICROgram(s) Inhaler 2 Puff(s) Inhalation two times a day  nitroprusside Infusion 1 MICROgram(s)/kG/Min (9 mL/Hr) IV Continuous <Continuous>  potassium acid phosphate/sodium acid phosphate tablet (K-PHOS No. 2) 1 Tablet(s) Oral four times a day with meals  potassium acid phosphate/sodium acid phosphate tablet (K-PHOS No. 2) 1 Tablet(s) Oral four times a day with meals  hydrALAZINE 25 milliGRAM(s) Oral every 8 hours    MEDICATIONS  (PRN):  ondansetron Injectable 4 milliGRAM(s) IV Push every 6 hours PRN Nausea and/or Vomiting  aluminum hydroxide/magnesium hydroxide/simethicone Suspension 30 milliLiter(s) Oral every 4 hours PRN Dyspepsia      PHYSICAL EXAM:    ICU Vital Signs Last 24 Hrs  T(C): 36.7 (04 Sep 2017 05:00), Max: 37 (03 Sep 2017 18:00)  T(F): 98.1 (04 Sep 2017 05:00), Max: 98.6 (03 Sep 2017 18:00)  HR: 100 (04 Sep 2017 07:00) (78 - 114)  BP: --  BP(mean): --  ABP: 102/72 (04 Sep 2017 07:00) (88/56 - 114/72)  ABP(mean): 80 (04 Sep 2017 07:00) (64 - 84)  RR: 20 (04 Sep 2017 07:00) (14 - 25)  SpO2: 96% (04 Sep 2017 07:00) (86% - 98%)    Is/Os: Net neg 700 cc    PHYSICAL EXAM:  GENERAL: NAD, cachectic   HEAD:  Atraumatic, Normocephalic  EYES: EOMI, PERRLA, conjunctiva and sclera clear  NECK: Supple, No JVD today   CHEST/LUNG: Clear to auscultation bilaterally; No wheeze  HEART: Regular rate and rhythm; + S1, S2, S3 No rubs, or gallops  ABDOMEN: Soft, Nontender, Nondistended; Bowel sounds present  EXTREMITIES:  2+ Peripheral Pulses, No clubbing, cyanosis, or edema. Extremities warm and perfusing well   PSYCH: AAOx3  NEUROLOGY: non-focal  SKIN: No rashes or lesions    LABS:	 	                                   12.9   5.8   )-----------( 106      ( 04 Sep 2017 04:30 )             39.1     09-04    137  |  103  |  18  ----------------------------<  104<H>  4.3   |  24  |  0.85    Ca    8.5      04 Sep 2017 04:30  Phos  2.4     09-04  Mg     1.9     09-04    TPro  5.9<L>  /  Alb  3.2<L>  /  TBili  0.5  /  DBili  x   /  AST  27  /  ALT  65<H>  /  AlkPhos  92  09-04    < from: TTE with Doppler (w/Cont) (08.28.17 @ 22:35) >  Dimensions:    Normal Values:  LA:     4.2    2.0 - 4.0 cm  Ao:     3.3    2.0 - 3.8 cm  SEPTUM: 0.7    0.6 - 1.2 cm  PWT:    0.8    0.6 - 1.1 cm  LVIDd:  6.7    3.0 - 5.6 cm  LVIDs:  6.4    1.8 - 4.0 cm  Derived variables:  LVMI: 115 g/m2  RWT: 0.23  Fractional short: 4 %  EF (Visual Estimate): 10-15 %  ------------------------------------------------------------------------  Observations:  Mitral Valve: Thickened mitral valve. Dilated mitral valve  annulus. Tethered posterior leaflet.  Severe, eccentric,  posteriorly-directed mitral regurgitation. Flow reversal is  seen in the pulmonary veins.  Aortic Valve/Aorta: Thickened aortic valve.  Aortic Root: 3.3 cm.  Left Atrium: Severely dilatedleft atrium.  LA volume index  = 63 cc/m2.  Left Ventricle: Endocardial visualization enhanced with  intravenous injection of echo contrast (Definity). Severe  global left ventricular systolic dysfunction with regional  variation (the mid to distal anterior wall and septum are  akinetic). Spontaneous echocardiographic contrast is seen  in the left ventricle. Ill-defined mass at the apex of the  left ventricle. May be left ventricular trabeculation or  thrombus. Consider MRI to further evaluate. Moderate to  severe left ventricular enlargement. E/e' equals 20,  consistent with elevated left ventricular end diastolic  pressure.  Right Heart: Moderate right atrial enlargement. Right  ventricular enlargement with decreased right ventricular  systolic function.  Diastolic septal flattening consistent  with right ventricular volume overload.  Thickened  tricuspid valve. Moderate-severe tricuspid regurgitation.  Normal pulmonic valve. Mild pulmonic regurgitation.  Pericardium/Pleura: Trace pericardial effusion.  Bilateral pleural effusions.  ------------------------------------------------------------------------  Conclusions:  1. Thickened mitral valve. Dilated mitral valve annulus.  Tethered posterior leaflet.  Severe, eccentric,  posteriorly-directed mitral regurgitation. Flow reversal is  seen in the pulmonary veins.  2. Thickened aortic valve.  3. Severely dilated left atrium.  4. Moderate to severe left ventricular enlargement.  5. Endocardial visualization enhanced with intravenous  injection of echo contrast (Definity). Severe global left  ventricular systolic dysfunction with regional variation  (the mid to distal anterior wall and septum are akinetic).  Spontaneous echocardiographic contrast is seen in the left  ventricle. Ill-defined, mass at the apex of the left  ventricle. May be left ventricular trabeculation or  thrombus. Consider MRI to further evaluate.  6. E/e' equals 20, consistent with elevated left  ventricular end diastolic pressure.  7. Moderate right atrial enlargement.  8. Right ventricular enlargement with decreased right  ventricular systolic function.  Diastolic septal flattening  consistent with right ventricular volume overload.  9. Thickened tricuspid valve. Moderate-severe tricuspid  regurgitation.  10. Trace pericardial effusion.  ------------------------------------------------------------------------  Confirmed on  8/28/2017 - 15:54:57 by Matias Stubbs M.D.  ------------------------------------------------------------------------    < end of copied text >

## 2017-09-04 NOTE — PROGRESS NOTE ADULT - PROBLEM SELECTOR PLAN 3
Wasted and cachectic, but with preserved prealbumin  We will consult nutrition and encourage increased caloric intake.

## 2017-09-04 NOTE — PROGRESS NOTE ADULT - PROBLEM SELECTOR PLAN 1
On Nipride Infusion. Cardiac parameters monitoring indicates an improvement in CO and CI. May need LV Assist device. Will FU with CCU team

## 2017-09-04 NOTE — PROGRESS NOTE ADULT - ASSESSMENT
Mr. 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 He is currently stabilized on nitroprusside with significant improvement in cardiac output and normalization of his filling pressures, but his cardiac output remains low. We have initiated an LVAD evaluation given his advanced disease. We are currently trying to transition him to oral vasodilators.

## 2017-09-04 NOTE — PROGRESS NOTE ADULT - PROBLEM SELECTOR PLAN 1
For now we will continue the nitroprusside at 1 mcg/kg/min.   We will increase the hydralazine to 20 mg PO TID and uptitrate as tolerated to a MAP of 65 mmHg.   He is currently euvolemic and does not need IV diuretics. Urine output is adequate

## 2017-09-04 NOTE — PROGRESS NOTE ADULT - SUBJECTIVE AND OBJECTIVE BOX
Subjective: No current complaints. His abdomen does not hurt today. He is not short of breath.     Medications:  ondansetron Injectable 4 milliGRAM(s) IV Push every 6 hours PRN  pantoprazole    Tablet 40 milliGRAM(s) Oral two times a day before meals  sucralfate suspension 1 Gram(s) Oral four times a day  atorvastatin 20 milliGRAM(s) Oral at bedtime  tiotropium 18 MICROgram(s) Capsule 1 Capsule(s) Inhalation daily  multivitamin 1 Tablet(s) Oral daily  heparin  Injectable 5000 Unit(s) SubCutaneous every 12 hours  buDESOnide 160 MICROgram(s)/formoterol 4.5 MICROgram(s) Inhaler 2 Puff(s) Inhalation two times a day  nitroprusside Infusion 1 MICROgram(s)/kG/Min IV Continuous <Continuous>  aluminum hydroxide/magnesium hydroxide/simethicone Suspension 30 milliLiter(s) Oral every 4 hours PRN  potassium acid phosphate/sodium acid phosphate tablet (K-PHOS No. 2) 1 Tablet(s) Oral four times a day with meals  potassium acid phosphate/sodium acid phosphate tablet (K-PHOS No. 2) 1 Tablet(s) Oral four times a day with meals  hydrALAZINE 10 milliGRAM(s) Oral once  hydrALAZINE 25 milliGRAM(s) Oral every 8 hours    Physical Exam:    Vitals:  T(C): 36.6 (09-04-17 @ 08:00), Max: 37 (09-03-17 @ 18:00)  HR: 100 (09-04-17 @ 08:00) (78 - 114)  ABP: 94/64 (09-04-17 @ 08:00) (88/56 - 114/72)  ABP(mean): 74 (09-04-17 @ 08:00) (64 - 84)  RR: 20 (09-04-17 @ 08:00) (14 - 25)  SpO2: 96% (09-04-17 @ 08:00) (86% - 98%)  PA: 52/26 (09-04-17 @ 08:00) (38/16 - 58/30)  PA(mean): 37 (09-04-17 @ 08:00) (25 - 42)      Daily     I&O's Summary    03 Sep 2017 07:01  -  04 Sep 2017 07:00  --------------------------------------------------------  IN: 523.4 mL / OUT: 1250 mL / NET: -726.6 mL    04 Sep 2017 07:01  -  04 Sep 2017 08:24  --------------------------------------------------------  IN: 9 mL / OUT: 0 mL / NET: 9 mL    General: No distress. Comfortable.  HEENT: EOM intact.  Neck: Neck supple. JVP not elevated. No masses  Chest: Clear to auscultation bilaterally  CV: Parasternal lift. Regular rate and rhythm with normal S1 and S2. + S3. Radial pulses normal. No edema.   Abdomen: Soft, non-distended, non-tender  Skin: No rashes or skin breakdown  Neurology: Alert and oriented times three. Sensation intact  Psych: Affect withdrawn    Labs:                        12.9   5.8   )-----------( 106      ( 04 Sep 2017 04:30 )             39.1     09-04    137  |  103  |  18  ----------------------------<  104<H>  4.3   |  24  |  0.85    Ca    8.5      04 Sep 2017 04:30  Phos  2.4     09-04  Mg     1.9     09-04    TPro  5.9<L>  /  Alb  3.2<L>  /  TBili  0.5  /  DBili  x   /  AST  27  /  ALT  65<H>  /  AlkPhos  92  09-04      Oxygen Saturation, Mixed: 54 % (09-04 @ 08:10)  Oxygen Saturation, Mixed: 55 % (09-04 @ 04:29)  Oxygen Saturation, Mixed: 71 % (09-03 @ 22:01)

## 2017-09-04 NOTE — CHART NOTE - NSCHARTNOTEFT_GEN_A_CORE
====================  CCU MIDNIGHT ROUNDS  ====================    RICKY JOINT  [id number]  Patient is a 62y old  Male who presents with a chief complaint of SOOB (25 Aug 2017 22:27)  ====================  SUMMARY:  ====================  Patient is admitted for acute systolic HF s/p RHC with CO = 1.15 and CI = 0.63 and EF 10%.  Now with PAC, started on Nitoprusside drip.  Hemodynamics improving with slightly higher dose of Nitroprusside.    ====================  NEW EVENTS:  ====================  Remains clinically euvolemic.  No orthopnea, satting well on RA.  Hemodynamics improved with SVO2 = 71, CO = 5.9, and CI = 3.4    MEDICATIONS  (STANDING):  pantoprazole    Tablet 40 milliGRAM(s) Oral two times a day before meals  sucralfate suspension 1 Gram(s) Oral four times a day  atorvastatin 20 milliGRAM(s) Oral at bedtime  tiotropium 18 MICROgram(s) Capsule 1 Capsule(s) Inhalation daily  multivitamin 1 Tablet(s) Oral daily  heparin  Injectable 5000 Unit(s) SubCutaneous every 12 hours  buDESOnide 160 MICROgram(s)/formoterol 4.5 MICROgram(s) Inhaler 2 Puff(s) Inhalation two times a day  nitroprusside Infusion 1 MICROgram(s)/kG/Min (9 mL/Hr) IV Continuous <Continuous>  hydrALAZINE 10 milliGRAM(s) Oral every 8 hours    MEDICATIONS  (PRN):  ondansetron Injectable 4 milliGRAM(s) IV Push every 6 hours PRN Nausea and/or Vomiting  aluminum hydroxide/magnesium hydroxide/simethicone Suspension 30 milliLiter(s) Oral every 4 hours PRN Dyspepsia      ====================  VITALS (Last 12 hrs):  ====================    T(C): 36.8 (09-04-17 @ 00:00), Max: 37 (09-03-17 @ 18:00)  T(F): 98.2 (09-04-17 @ 00:00), Max: 98.6 (09-03-17 @ 18:00)  HR: 100 (09-04-17 @ 00:00) (78 - 114)  BP: --  BP(mean): --  ABP: 90/52 (09-04-17 @ 00:00) (90/52 - 114/72)  ABP(mean): 64 (09-04-17 @ 00:00) (64 - 84)  RR: 20 (09-04-17 @ 00:00) (14 - 25)  SpO2: 94% (09-04-17 @ 00:00) (86% - 98%)  Wt(kg): --  CVP(mm Hg): 4 (09-04-17 @ 00:00) (1 - 13)  CVP(cm H2O): --  CO: --  CI: --  PA: 38/16 (09-04-17 @ 00:00) (38/16 - 58/28)  PA(mean): 25 (09-04-17 @ 00:00) (25 - 41)  PCWP: --  SVR: --  PVR: --    I&O's Summary    02 Sep 2017 07:01  -  03 Sep 2017 07:00  --------------------------------------------------------  IN: 816.4 mL / OUT: 1150 mL / NET: -333.6 mL    03 Sep 2017 07:01  -  04 Sep 2017 00:13  --------------------------------------------------------  IN: 410.4 mL / OUT: 800 mL / NET: -389.6 mL    ====================  NEW LABS:  ====================    09-03    139  |  103  |  29<H>  ----------------------------<  110<H>  4.1   |  25  |  0.87    Ca    8.5      03 Sep 2017 04:21  Phos  2.4     09-03  Mg     2.0     09-03    TPro  6.0  /  Alb  3.2<L>  /  TBili  0.5  /  DBili  x   /  AST  40  /  ALT  85<H>  /  AlkPhos  108  09-03    Creatine Kinase, Serum: 90 U/L (09-03-17 @ 16:56)  Troponin T, Serum: 0.47 ng/mL <H> (09-03-17 @ 16:56)    CKMB Units: 2.2 ng/mL (09-03 @ 16:56)    ABG - ( 03 Sep 2017 22:01 )  pH: 7.45  /  pCO2: 38    /  pO2: 77    / HCO3: 26    / Base Excess: 2.5   /  SaO2: 95        ====================  PLAN:  ====================  - Monitor hemodynamics; significantly improved on Nitroprusside at 1 mcg/min.  - Continue Hydralazine for afterload reduction  - Continue Nitroprusside drip and titrate as tolerated  - Monitor I & O's and electrolytes; replete as needed  - LVAD w/u per HF  - OOB to chair as tolerated    Maria Smith CCU NP  Beeper #[id number]  Spectra # [phone]

## 2017-09-04 NOTE — CHART NOTE - NSCHARTNOTEFT_GEN_A_CORE
====================  CCU MIDNIGHT ROUNDS  ====================    RICKY JOINT  [id number]  Patient is a 62y old  Male who presents with a chief complaint of SOB (25 Aug 2017 22:27)      ====================  SUMMARY:  ====================    Patient is admitted for acute systolic HF s/p RHC with CO = 1.15 and CI = 0.63 and EF 10%.  Now with PAC, started on Nitoprusside drip.    ====================  NEW EVENTS:  ====================        ====================  VITALS (Last 12 hrs):  ====================    T(C): 37.1 (09-04-17 @ 19:00), Max: 37.1 (09-04-17 @ 19:00)  T(F): 98.8 (09-04-17 @ 19:00), Max: 98.8 (09-04-17 @ 19:00)  HR: 104 (09-04-17 @ 22:30) (100 - 112)  BP: --  BP(mean): --  ABP: 104/66 (09-04-17 @ 22:30) (82/68 - 118/76)  ABP(mean): 80 (09-04-17 @ 22:30) (68 - 90)  RR: 25 (09-04-17 @ 22:30) (19 - 27)  SpO2: 96% (09-04-17 @ 22:30) (93% - 98%)  Wt(kg): --  CVP(mm Hg): 11 (09-04-17 @ 22:30) (1 - 18)  CVP(cm H2O): --  CO: --  CI: --  PA: 56/26 (09-04-17 @ 22:30) (42/16 - 62/30)  PA(mean): 13 (09-04-17 @ 22:30) (13 - 42)  PCWP: --  SVR: --  PVR: --    I&O's Summary    03 Sep 2017 07:01  -  04 Sep 2017 07:00  --------------------------------------------------------  IN: 523.4 mL / OUT: 1250 mL / NET: -726.6 mL    04 Sep 2017 07:01  -  04 Sep 2017 23:00  --------------------------------------------------------  IN: 939 mL / OUT: 800 mL / NET: 139 mL            ====================  NEW LABS:  ====================                          12.9   5.8   )-----------( 106      ( 04 Sep 2017 04:30 )             39.1     09-04    137  |  103  |  18  ----------------------------<  104<H>  4.3   |  24  |  0.85    Ca    8.5      04 Sep 2017 04:30  Phos  2.4     09-04  Mg     1.9     09-04    TPro  5.9<L>  /  Alb  3.2<L>  /  TBili  0.5  /  DBili  x   /  AST  27  /  ALT  65<H>  /  AlkPhos  92  09-04          ABG - ( 04 Sep 2017 15:09 )  pH: 7.48  /  pCO2: 30    /  pO2: 94    / HCO3: 22    / Base Excess: -.4   /  SaO2: 98                    ====================  PLAN:  ====================  - Continue Hydralazine for afterload reduction  - Continue Nitroprusside drip and titrate as tolerated  - Monitor I & O's and electrolytes; replete as needed  - LVAD w/u per HF  - OOB to chair as tolerated        Dion Hernandez MD  PGY2 Internal Medicine Resident  Pager: [phone] - [place]  [phone] - [facility] ====================  CCU MIDNIGHT ROUNDS  ====================    RICKY JOINT  [id number]  Patient is a 62y old  Male who presents with a chief complaint of SOB (25 Aug 2017 22:27)      ====================  SUMMARY:  ====================    Patient is admitted for acute systolic HF s/p RHC with CO = 1.15 and CI = 0.63 and EF 10%.  Now with PAC, started on Nitoprusside drip.    ====================  NEW EVENTS:  ====================    Patient denies any CP SOB ERICK orthopnea, f nvd. GEOVANY calculations: CO: 4.4, CI 2.51, Mixed O2 = 64, up from 47.      ====================  VITALS (Last 12 hrs):  ====================    T(C): 37.1 (09-04-17 @ 19:00), Max: 37.1 (09-04-17 @ 19:00)  T(F): 98.8 (09-04-17 @ 19:00), Max: 98.8 (09-04-17 @ 19:00)  HR: 104 (09-04-17 @ 22:30) (100 - 112)  BP: --  BP(mean): --  ABP: 104/66 (09-04-17 @ 22:30) (82/68 - 118/76)  ABP(mean): 80 (09-04-17 @ 22:30) (68 - 90)  RR: 25 (09-04-17 @ 22:30) (19 - 27)  SpO2: 96% (09-04-17 @ 22:30) (93% - 98%)  Wt(kg): --  CVP(mm Hg): 11 (09-04-17 @ 22:30) (1 - 18)  CVP(cm H2O): --  CO: --  CI: --  PA: 56/26 (09-04-17 @ 22:30) (42/16 - 62/30)  PA(mean): 13 (09-04-17 @ 22:30) (13 - 42)  PCWP: --  SVR: --  PVR: --    I&O's Summary    03 Sep 2017 07:01  -  04 Sep 2017 07:00  --------------------------------------------------------  IN: 523.4 mL / OUT: 1250 mL / NET: -726.6 mL    04 Sep 2017 07:01  -  04 Sep 2017 23:00  --------------------------------------------------------  IN: 939 mL / OUT: 800 mL / NET: 139 mL            ====================  NEW LABS:  ====================                          12.9   5.8   )-----------( 106      ( 04 Sep 2017 04:30 )             39.1     09-04    137  |  103  |  18  ----------------------------<  104<H>  4.3   |  24  |  0.85    Ca    8.5      04 Sep 2017 04:30  Phos  2.4     09-04  Mg     1.9     09-04    TPro  5.9<L>  /  Alb  3.2<L>  /  TBili  0.5  /  DBili  x   /  AST  27  /  ALT  65<H>  /  AlkPhos  92  09-04          ABG - ( 04 Sep 2017 15:09 )  pH: 7.48  /  pCO2: 30    /  pO2: 94    / HCO3: 22    / Base Excess: -.4   /  SaO2: 98                    ====================  PLAN:  ====================  - Continue Hydralazine for afterload reduction  - Continue Nitroprusside drip and titrate as tolerated  - Monitor I & O's and electrolytes; replete as needed  - LVAD w/u per HF  - OOB to chair as tolerated        Dion Hernandez MD  PGY2 Internal Medicine Resident  Pager: [phone] - [place]  [id number] - [facility]

## 2017-09-04 NOTE — PROGRESS NOTE ADULT - PROBLEM SELECTOR PLAN 2
C.I--1.6 and C.O--2.8. SVR~1700  c/w nitroprusside at 1 mcg/kg/min.   Decrease afterload and switch nitroprusside to oral as tolerated   MAP of 65 mmHg.   He is currently euvolemic and does not need IV diuretics. Urine output is adequate  LVAD evaluation initiated. We will attempt oral therapy, HF following  Not a candidate for Transplant given active smoking

## 2017-09-04 NOTE — PROGRESS NOTE ADULT - SUBJECTIVE AND OBJECTIVE BOX
Patient is a 62y old  Male who presents with a chief complaint of SOOB (25 Aug 2017 22:27)  doing ok  no SOB   no cough    on nitorprusside drip     Any change in ROS:     MEDICATIONS  (STANDING):  pantoprazole    Tablet 40 milliGRAM(s) Oral two times a day before meals  sucralfate suspension 1 Gram(s) Oral four times a day  atorvastatin 20 milliGRAM(s) Oral at bedtime  tiotropium 18 MICROgram(s) Capsule 1 Capsule(s) Inhalation daily  multivitamin 1 Tablet(s) Oral daily  heparin  Injectable 5000 Unit(s) SubCutaneous every 12 hours  buDESOnide 160 MICROgram(s)/formoterol 4.5 MICROgram(s) Inhaler 2 Puff(s) Inhalation two times a day  nitroprusside Infusion 1 MICROgram(s)/kG/Min (9 mL/Hr) IV Continuous <Continuous>  potassium acid phosphate/sodium acid phosphate tablet (K-PHOS No. 2) 1 Tablet(s) Oral four times a day with meals  potassium acid phosphate/sodium acid phosphate tablet (K-PHOS No. 2) 1 Tablet(s) Oral four times a day with meals  hydrALAZINE 25 milliGRAM(s) Oral every 8 hours    MEDICATIONS  (PRN):  ondansetron Injectable 4 milliGRAM(s) IV Push every 6 hours PRN Nausea and/or Vomiting  aluminum hydroxide/magnesium hydroxide/simethicone Suspension 30 milliLiter(s) Oral every 4 hours PRN Dyspepsia    Vital Signs Last 24 Hrs  T(C): 36.6 (04 Sep 2017 08:00), Max: 37 (03 Sep 2017 18:00)  T(F): 97.9 (04 Sep 2017 08:00), Max: 98.6 (03 Sep 2017 18:00)  HR: 102 (04 Sep 2017 10:30) (78 - 114)  BP: --  BP(mean): --  RR: 22 (04 Sep 2017 10:30) (14 - 26)  SpO2: 92% (04 Sep 2017 10:30) (86% - 98%)    I&O's Summary    03 Sep 2017 07:01  -  04 Sep 2017 07:00  --------------------------------------------------------  IN: 523.4 mL / OUT: 1250 mL / NET: -726.6 mL    04 Sep 2017 07:01  -  04 Sep 2017 11:01  --------------------------------------------------------  IN: 258 mL / OUT: 0 mL / NET: 258 mL          Physical Exam:   GENERAL: NAD, well-groomed, well-developed  HEENT: ALONSO/   Atraumatic, Normocephalic  ENMT: No tonsillar erythema, exudates, or enlargement; Moist mucous membranes, Good dentition, No lesions  NECK: + JVD   CHEST/LUNG: Clear  CVS: Regular rate and rhythm; No murmurs, rubs, or gallops  GI: : Soft, Nontender, Nondistended; Bowel sounds present  NERVOUS SYSTEM:  Alert & Oriented X3  EXTREMITIES:  2+ Peripheral Pulses, No clubbing, cyanosis, or edema  LYMPH: No lymphadenopathy noted  SKIN: No rashes or lesions  ENDOCRINOLOGY: No Thyromegaly  PSYCH: Appropriate    Labs:  ABG - ( 04 Sep 2017 08:10 )  pH: 7.50  /  pCO2: 29    /  pO2: 82    / HCO3: 23    / Base Excess: .9    /  SaO2: 98        CARDIAC MARKERS ( 03 Sep 2017 16:56 )  x     / 0.47 ng/mL / 90 U/L / x     / 2.2 ng/mL                            12.9   5.8   )-----------( 106      ( 04 Sep 2017 04:30 )             39.1                         14.1   5.4   )-----------( 110      ( 03 Sep 2017 15:08 )             43.0                         13.2   5.5   )-----------( 110      ( 03 Sep 2017 04:21 )             40.0                         12.7   6.2   )-----------( 114      ( 02 Sep 2017 21:13 )             38.4                         13.8   5.0   )-----------( 123      ( 02 Sep 2017 12:55 )             41.3                         13.2   4.6   )-----------( 112      ( 02 Sep 2017 05:18 )             38.4                         14.8   5.1   )-----------( 136      ( 01 Sep 2017 22:43 )             43.5                         15.3   5.2   )-----------( 137      ( 01 Sep 2017 18:40 )             45.3     09-04    137  |  103  |  18  ----------------------------<  104<H>  4.3   |  24  |  0.85  09-03    139  |  103  |  29<H>  ----------------------------<  110<H>  4.1   |  25  |  0.87  09-02    136  |  101  |  34<H>  ----------------------------<  147<H>  4.0   |  26  |  1.09  09-02    137  |  98  |  38<H>  ----------------------------<  116<H>  4.1   |  27  |  1.20  09-01    132<L>  |  95<L>  |  40<H>  ----------------------------<  202<H>  4.9   |  22  |  1.28  09-01    136  |  96  |  35<H>  ----------------------------<  158<H>  4.7   |  25  |  1.19  08-31    139  |  100  |  33<H>  ----------------------------<  130<H>  3.9   |  24  |  1.06    Ca    8.5      04 Sep 2017 04:30  Ca    8.5      03 Sep 2017 04:21  Ca    8.6      02 Sep 2017 21:13  Phos  2.4     09-04  Phos  2.4     09-03  Phos  2.3     09-02  Mg     1.9     09-04  Mg     2.0     09-03  Mg     2.0     09-02    TPro  5.9<L>  /  Alb  3.2<L>  /  TBili  0.5  /  DBili  x   /  AST  27  /  ALT  65<H>  /  AlkPhos  92  09-04  TPro  6.0  /  Alb  3.2<L>  /  TBili  0.5  /  DBili  x   /  AST  40  /  ALT  85<H>  /  AlkPhos  108  09-03  TPro  5.8<L>  /  Alb  3.2<L>  /  TBili  0.4  /  DBili  x   /  AST  42<H>  /  ALT  85<H>  /  AlkPhos  103  09-02  TPro  5.7<L>  /  Alb  3.1<L>  /  TBili  0.5  /  DBili  x   /  AST  50<H>  /  ALT  98<H>  /  AlkPhos  102  09-02  TPro  6.4  /  Alb  3.5  /  TBili  0.6  /  DBili  x   /  AST  58<H>  /  ALT  118<H>  /  AlkPhos  121<H>  09-01  TPro  x   /  Alb  x   /  TBili  x   /  DBili  0.2  /  AST  x   /  ALT  x   /  AlkPhos  x   09-01    CAPILLARY BLOOD GLUCOSE          LIVER FUNCTIONS - ( 04 Sep 2017 04:30 )  Alb: 3.2 g/dL / Pro: 5.9 g/dL / ALK PHOS: 92 U/L / ALT: 65 U/L RC / AST: 27 U/L / GGT: x               Lactate, Blood: 2.9 mmol/L (09-01 @ 13:04)  Cultures:       Studies  Chest X-RAY  CT SCAN Chest   Venous Dopplers: LE:   CT Abdomen  Others      < from: Xray Chest 1 View AP- PORTABLE-Urgent (09.02.17 @ 08:58) >  FINDINGS:    [place]-Tariq catheter entering from right IJ, with distal tip ending in the   right pulmonary artery.    Unchanged mild pulmonary vascular congestion. No pneumothorax. No pleural   effusions.  There is no pneumothorax or pleural effusions.   The cardiomediastinal silhouette cannot be adequately assessed in this   projection.    IMPRESSION:   [place]-Tariq catheter entering from right-sided IJ, with distal tip ending   in the right pulmonary artery.                  MENDEZ TENA M.D., RADIOLOGY RESIDENT  This document has been electronically signed.  HAYLEY LEAL M.D., ATTENDING RADIOLOGIST    < end of copied text >

## 2017-09-05 LAB
ALBUMIN SERPL ELPH-MCNC: 3.1 G/DL — LOW (ref 3.3–5)
ALP SERPL-CCNC: 103 U/L — SIGNIFICANT CHANGE UP (ref 40–120)
ALT FLD-CCNC: 57 U/L RC — HIGH (ref 10–45)
ANABASINE UR-MCNC: <2 NG/ML — SIGNIFICANT CHANGE UP
ANION GAP SERPL CALC-SCNC: 11 MMOL/L — SIGNIFICANT CHANGE UP (ref 5–17)
AST SERPL-CCNC: 27 U/L — SIGNIFICANT CHANGE UP (ref 10–40)
BASE EXCESS BLDMV CALC-SCNC: 0.6 MMOL/L — SIGNIFICANT CHANGE UP (ref -3–3)
BASE EXCESS BLDMV CALC-SCNC: 0.7 MMOL/L — SIGNIFICANT CHANGE UP (ref -3–3)
BASE EXCESS BLDMV CALC-SCNC: 2.7 MMOL/L — SIGNIFICANT CHANGE UP (ref -3–3)
BASE EXCESS BLDMV CALC-SCNC: 2.7 MMOL/L — SIGNIFICANT CHANGE UP (ref -3–3)
BASOPHILS # BLD AUTO: 0.1 K/UL — SIGNIFICANT CHANGE UP (ref 0–0.2)
BASOPHILS NFR BLD AUTO: 1.4 % — SIGNIFICANT CHANGE UP (ref 0–2)
BILIRUB SERPL-MCNC: 0.4 MG/DL — SIGNIFICANT CHANGE UP (ref 0.2–1.2)
BUN SERPL-MCNC: 25 MG/DL — HIGH (ref 7–23)
CALCIUM SERPL-MCNC: 8.4 MG/DL — SIGNIFICANT CHANGE UP (ref 8.4–10.5)
CHLORIDE SERPL-SCNC: 101 MMOL/L — SIGNIFICANT CHANGE UP (ref 96–108)
CK MB BLD-MCNC: 2.3 % — SIGNIFICANT CHANGE UP (ref 0–3.5)
CK MB CFR SERPL CALC: 2.2 NG/ML — SIGNIFICANT CHANGE UP (ref 0–6.7)
CK SERPL-CCNC: 96 U/L — SIGNIFICANT CHANGE UP (ref 30–200)
CO2 BLDMV-SCNC: 26 MMOL/L — SIGNIFICANT CHANGE UP (ref 21–29)
CO2 BLDMV-SCNC: 26 MMOL/L — SIGNIFICANT CHANGE UP (ref 21–29)
CO2 BLDMV-SCNC: 28 MMOL/L — SIGNIFICANT CHANGE UP (ref 21–29)
CO2 BLDMV-SCNC: 28 MMOL/L — SIGNIFICANT CHANGE UP (ref 21–29)
CO2 SERPL-SCNC: 23 MMOL/L — SIGNIFICANT CHANGE UP (ref 22–31)
COTININE UR-MCNC: 23 NG/ML — HIGH
CREAT SERPL-MCNC: 0.82 MG/DL — SIGNIFICANT CHANGE UP (ref 0.5–1.3)
CYANIDE SERPL-MCNC: <0.1 MG/L — SIGNIFICANT CHANGE UP
EOSINOPHIL # BLD AUTO: 0.3 K/UL — SIGNIFICANT CHANGE UP (ref 0–0.5)
EOSINOPHIL NFR BLD AUTO: 6.1 % — HIGH (ref 0–6)
GAS PNL BLDA: SIGNIFICANT CHANGE UP
GAS PNL BLDMV: SIGNIFICANT CHANGE UP
GLUCOSE SERPL-MCNC: 219 MG/DL — HIGH (ref 70–99)
HCO3 BLDMV-SCNC: 25 MMOL/L — SIGNIFICANT CHANGE UP (ref 20–28)
HCO3 BLDMV-SCNC: 25 MMOL/L — SIGNIFICANT CHANGE UP (ref 20–28)
HCO3 BLDMV-SCNC: 27 MMOL/L — SIGNIFICANT CHANGE UP (ref 20–28)
HCO3 BLDMV-SCNC: 27 MMOL/L — SIGNIFICANT CHANGE UP (ref 20–28)
HCT VFR BLD CALC: 37.8 % — LOW (ref 39–50)
HGB BLD-MCNC: 12.4 G/DL — LOW (ref 13–17)
HOROWITZ INDEX BLDMV+IHG-RTO: 21 — SIGNIFICANT CHANGE UP
LYMPHOCYTES # BLD AUTO: 1.5 K/UL — SIGNIFICANT CHANGE UP (ref 1–3.3)
LYMPHOCYTES # BLD AUTO: 27.3 % — SIGNIFICANT CHANGE UP (ref 13–44)
M TB TUBERC IFN-G BLD QL: 0.03 IU/ML — SIGNIFICANT CHANGE UP
M TB TUBERC IFN-G BLD QL: 0.1 IU/ML — SIGNIFICANT CHANGE UP
M TB TUBERC IFN-G BLD QL: NEGATIVE — SIGNIFICANT CHANGE UP
MAGNESIUM SERPL-MCNC: 1.8 MG/DL — SIGNIFICANT CHANGE UP (ref 1.6–2.6)
MCHC RBC-ENTMCNC: 32.7 GM/DL — SIGNIFICANT CHANGE UP (ref 32–36)
MCHC RBC-ENTMCNC: 32.7 PG — SIGNIFICANT CHANGE UP (ref 27–34)
MCV RBC AUTO: 99.8 FL — SIGNIFICANT CHANGE UP (ref 80–100)
MITOGEN IGNF BCKGRD COR BLD-ACNC: 7.62 IU/ML — SIGNIFICANT CHANGE UP
MONOCYTES # BLD AUTO: 0.6 K/UL — SIGNIFICANT CHANGE UP (ref 0–0.9)
MONOCYTES NFR BLD AUTO: 9.9 % — SIGNIFICANT CHANGE UP (ref 2–14)
NEUTROPHILS # BLD AUTO: 3.1 K/UL — SIGNIFICANT CHANGE UP (ref 1.8–7.4)
NEUTROPHILS NFR BLD AUTO: 55.3 % — SIGNIFICANT CHANGE UP (ref 43–77)
NICOTINE UR-MCNC: 4.5 NG/ML — HIGH
NORNICOTINE UR-MCNC: <2 NG/ML — SIGNIFICANT CHANGE UP
O2 CT VFR BLD CALC: 30 MMHG — SIGNIFICANT CHANGE UP (ref 30–65)
O2 CT VFR BLD CALC: 30 MMHG — SIGNIFICANT CHANGE UP (ref 30–65)
O2 CT VFR BLD CALC: 34 MMHG — SIGNIFICANT CHANGE UP (ref 30–65)
O2 CT VFR BLD CALC: 38 MMHG — SIGNIFICANT CHANGE UP (ref 30–65)
PCO2 BLDMV: 40 MMHG — SIGNIFICANT CHANGE UP (ref 30–65)
PCO2 BLDMV: 41 MMHG — SIGNIFICANT CHANGE UP (ref 30–65)
PCO2 BLDMV: 41 MMHG — SIGNIFICANT CHANGE UP (ref 30–65)
PCO2 BLDMV: 43 MMHG — SIGNIFICANT CHANGE UP (ref 30–65)
PH BLDMV: 7.39 — SIGNIFICANT CHANGE UP (ref 7.32–7.45)
PH BLDMV: 7.4 — SIGNIFICANT CHANGE UP (ref 7.32–7.45)
PH BLDMV: 7.43 — SIGNIFICANT CHANGE UP (ref 7.32–7.45)
PH BLDMV: 7.44 — SIGNIFICANT CHANGE UP (ref 7.32–7.45)
PHOSPHATE SERPL-MCNC: 1.9 MG/DL — LOW (ref 2.5–4.5)
PLATELET # BLD AUTO: 115 K/UL — LOW (ref 150–400)
POTASSIUM SERPL-MCNC: 4.1 MMOL/L — SIGNIFICANT CHANGE UP (ref 3.5–5.3)
POTASSIUM SERPL-SCNC: 4.1 MMOL/L — SIGNIFICANT CHANGE UP (ref 3.5–5.3)
PROT SERPL-MCNC: 5.9 G/DL — LOW (ref 6–8.3)
RBC # BLD: 3.79 M/UL — LOW (ref 4.2–5.8)
RBC # FLD: 14.4 % — SIGNIFICANT CHANGE UP (ref 10.3–14.5)
SAO2 % BLDMV: 49 % — LOW (ref 60–90)
SAO2 % BLDMV: 52 % — LOW (ref 60–90)
SAO2 % BLDMV: 59 % — LOW (ref 60–90)
SAO2 % BLDMV: 66 % — SIGNIFICANT CHANGE UP (ref 60–90)
SODIUM SERPL-SCNC: 135 MMOL/L — SIGNIFICANT CHANGE UP (ref 135–145)
TROPONIN T SERPL-MCNC: 0.33 NG/ML — HIGH (ref 0–0.06)
WBC # BLD: 5.6 K/UL — SIGNIFICANT CHANGE UP (ref 3.8–10.5)
WBC # FLD AUTO: 5.6 K/UL — SIGNIFICANT CHANGE UP (ref 3.8–10.5)

## 2017-09-05 PROCEDURE — 71010: CPT | Mod: 26

## 2017-09-05 PROCEDURE — 99233 SBSQ HOSP IP/OBS HIGH 50: CPT

## 2017-09-05 PROCEDURE — 99222 1ST HOSP IP/OBS MODERATE 55: CPT

## 2017-09-05 PROCEDURE — 93010 ELECTROCARDIOGRAM REPORT: CPT

## 2017-09-05 RX ORDER — FUROSEMIDE 40 MG
40 TABLET ORAL DAILY
Qty: 0 | Refills: 0 | Status: DISCONTINUED | OUTPATIENT
Start: 2017-09-05 | End: 2017-09-07

## 2017-09-05 RX ORDER — SENNA PLUS 8.6 MG/1
2 TABLET ORAL AT BEDTIME
Qty: 0 | Refills: 0 | Status: DISCONTINUED | OUTPATIENT
Start: 2017-09-05 | End: 2017-09-08

## 2017-09-05 RX ORDER — HYDRALAZINE HCL 50 MG
10 TABLET ORAL ONCE
Qty: 0 | Refills: 0 | Status: COMPLETED | OUTPATIENT
Start: 2017-09-05 | End: 2017-09-05

## 2017-09-05 RX ORDER — HYDRALAZINE HCL 50 MG
75 TABLET ORAL EVERY 8 HOURS
Qty: 0 | Refills: 0 | Status: DISCONTINUED | OUTPATIENT
Start: 2017-09-05 | End: 2017-09-05

## 2017-09-05 RX ORDER — HYDRALAZINE HCL 50 MG
12.5 TABLET ORAL ONCE
Qty: 0 | Refills: 0 | Status: DISCONTINUED | OUTPATIENT
Start: 2017-09-05 | End: 2017-09-05

## 2017-09-05 RX ORDER — MILRINONE LACTATE 1 MG/ML
0.38 INJECTION, SOLUTION INTRAVENOUS
Qty: 20 | Refills: 0 | Status: DISCONTINUED | OUTPATIENT
Start: 2017-09-05 | End: 2017-09-12

## 2017-09-05 RX ORDER — HYDRALAZINE HCL 50 MG
50 TABLET ORAL THREE TIMES A DAY
Qty: 0 | Refills: 0 | Status: DISCONTINUED | OUTPATIENT
Start: 2017-09-05 | End: 2017-09-05

## 2017-09-05 RX ORDER — POLYETHYLENE GLYCOL 3350 17 G/17G
17 POWDER, FOR SOLUTION ORAL DAILY
Qty: 0 | Refills: 0 | Status: DISCONTINUED | OUTPATIENT
Start: 2017-09-05 | End: 2017-09-08

## 2017-09-05 RX ORDER — ACETAMINOPHEN 500 MG
650 TABLET ORAL ONCE
Qty: 0 | Refills: 0 | Status: COMPLETED | OUTPATIENT
Start: 2017-09-05 | End: 2017-09-05

## 2017-09-05 RX ORDER — OXYCODONE HYDROCHLORIDE 5 MG/1
5 TABLET ORAL ONCE
Qty: 0 | Refills: 0 | Status: DISCONTINUED | OUTPATIENT
Start: 2017-09-05 | End: 2017-09-05

## 2017-09-05 RX ORDER — MAGNESIUM SULFATE 500 MG/ML
1 VIAL (ML) INJECTION ONCE
Qty: 0 | Refills: 0 | Status: COMPLETED | OUTPATIENT
Start: 2017-09-05 | End: 2017-09-05

## 2017-09-05 RX ORDER — HYDRALAZINE HCL 50 MG
50 TABLET ORAL EVERY 8 HOURS
Qty: 0 | Refills: 0 | Status: DISCONTINUED | OUTPATIENT
Start: 2017-09-05 | End: 2017-09-06

## 2017-09-05 RX ORDER — DOCUSATE SODIUM 100 MG
100 CAPSULE ORAL THREE TIMES A DAY
Qty: 0 | Refills: 0 | Status: DISCONTINUED | OUTPATIENT
Start: 2017-09-05 | End: 2017-09-08

## 2017-09-05 RX ADMIN — Medication 37.5 MILLIGRAM(S): at 05:24

## 2017-09-05 RX ADMIN — Medication 1 TABLET(S): at 13:35

## 2017-09-05 RX ADMIN — Medication 50 MILLIGRAM(S): at 21:52

## 2017-09-05 RX ADMIN — HEPARIN SODIUM 5000 UNIT(S): 5000 INJECTION INTRAVENOUS; SUBCUTANEOUS at 05:24

## 2017-09-05 RX ADMIN — Medication 1 GRAM(S): at 18:48

## 2017-09-05 RX ADMIN — Medication 1 GRAM(S): at 13:34

## 2017-09-05 RX ADMIN — OXYCODONE HYDROCHLORIDE 5 MILLIGRAM(S): 5 TABLET ORAL at 10:00

## 2017-09-05 RX ADMIN — MILRINONE LACTATE 4.5 MICROGRAM(S)/KG/MIN: 1 INJECTION, SOLUTION INTRAVENOUS at 13:36

## 2017-09-05 RX ADMIN — Medication 30 MILLILITER(S): at 02:20

## 2017-09-05 RX ADMIN — PANTOPRAZOLE SODIUM 40 MILLIGRAM(S): 20 TABLET, DELAYED RELEASE ORAL at 05:24

## 2017-09-05 RX ADMIN — Medication 650 MILLIGRAM(S): at 06:00

## 2017-09-05 RX ADMIN — HEPARIN SODIUM 5000 UNIT(S): 5000 INJECTION INTRAVENOUS; SUBCUTANEOUS at 18:49

## 2017-09-05 RX ADMIN — Medication 1 TABLET(S): at 09:05

## 2017-09-05 RX ADMIN — Medication 100 GRAM(S): at 05:24

## 2017-09-05 RX ADMIN — Medication 1 GRAM(S): at 05:24

## 2017-09-05 RX ADMIN — OXYCODONE HYDROCHLORIDE 5 MILLIGRAM(S): 5 TABLET ORAL at 09:06

## 2017-09-05 RX ADMIN — Medication 40 MILLIGRAM(S): at 15:18

## 2017-09-05 RX ADMIN — Medication 30 MILLILITER(S): at 10:16

## 2017-09-05 RX ADMIN — Medication 100 MILLIGRAM(S): at 21:52

## 2017-09-05 RX ADMIN — POLYETHYLENE GLYCOL 3350 17 GRAM(S): 17 POWDER, FOR SOLUTION ORAL at 13:35

## 2017-09-05 RX ADMIN — PANTOPRAZOLE SODIUM 40 MILLIGRAM(S): 20 TABLET, DELAYED RELEASE ORAL at 18:49

## 2017-09-05 RX ADMIN — TIOTROPIUM BROMIDE 1 CAPSULE(S): 18 CAPSULE ORAL; RESPIRATORY (INHALATION) at 11:17

## 2017-09-05 RX ADMIN — Medication 100 MILLIGRAM(S): at 13:35

## 2017-09-05 RX ADMIN — Medication 650 MILLIGRAM(S): at 06:30

## 2017-09-05 RX ADMIN — Medication 10 MILLIGRAM(S): at 08:24

## 2017-09-05 RX ADMIN — BUDESONIDE AND FORMOTEROL FUMARATE DIHYDRATE 2 PUFF(S): 160; 4.5 AEROSOL RESPIRATORY (INHALATION) at 09:25

## 2017-09-05 RX ADMIN — BUDESONIDE AND FORMOTEROL FUMARATE DIHYDRATE 2 PUFF(S): 160; 4.5 AEROSOL RESPIRATORY (INHALATION) at 21:48

## 2017-09-05 NOTE — PROGRESS NOTE ADULT - ATTENDING COMMENTS
62 yrs no prior medical history other than smoking. Admitted with abdominal pain on 8.25. Echo showed severe LV dysfunction possible thrombus and severe MR. MRI ruled out thrombus. R/L cath friday: RA 28 RV 60/25, PCWP 33 PA 60/37 40 PA sat 29 CO 1.8  Managed on Nipride 0.5 ug/kg/min through the weekend. Reduced to 0.3 last night (?)  Meds: HDZN 50 tid  110/70 RA 10 PA 60/30 PA 49 CI 1.3.   Lies flat without SOB. decreased AE bilat. S3, 3/6 HSM. soft abdomen no LE edeam  I/O even  CXR right sided effusion bilat PVC  Cr 0.8 normal LFTs.  Low CO state with normal BP on vasodilators.   Despite BP feel that patient has brittle hemodynamics and will likely need LVAD support (potential bridge to recovery).   Plan:  Start Milrinone 0.25 to 0.375.   Wean Nitro to HDZN 50 tid.   Lasix 40 IV QD when milrinone on board.   Full LVAD eval  Family meeting tomorrow.  Zi Werner

## 2017-09-05 NOTE — PROGRESS NOTE ADULT - PROBLEM SELECTOR PLAN 2
C.I--2.8 and C.O--4.4. SVR~1100  Will titrate off of nitroprusside and increase Hydralazine to 50   MAP of 65 mmHg.   He is currently euvolemic. Urine output is adequate  LVAD evaluation initiated. We will attempt oral therapy, HF following  Not a candidate for Transplant given active smoking

## 2017-09-05 NOTE — PROGRESS NOTE ADULT - SUBJECTIVE AND OBJECTIVE BOX
Patient is a 62y old  Male who presents with a chief complaint of SOOB (25 Aug 2017 22:27)      SUBJECTIVE / OVERNIGHT EVENTS:  Feels bad today.  SOB ++  Review of Systems:   CONSTITUTIONAL: No fever, weight loss, or fatigue  EYES: No eye pain, visual disturbances, or discharge  ENMT:  No difficulty hearing, tinnitus, vertigo; No sinus or throat pain  NECK: No pain or stiffness  BREASTS: No pain, masses, or nipple discharge  RESPIRATORY: No cough, wheezing, chills or hemoptysis; No shortness of breath  CARDIOVASCULAR: No chest pain, palpitations, dizziness, or leg swelling  GASTROINTESTINAL: No abdominal or epigastric pain. No nausea, vomiting, or hematemesis; No diarrhea or constipation. No melena or hematochezia.  GENITOURINARY: No dysuria, frequency, hematuria, or incontinence  NEUROLOGICAL: No headaches, memory loss, loss of strength, numbness, or tremors  SKIN: No itching, burning, rashes, or lesions   LYMPH NODES: No enlarged glands  ENDOCRINE: No heat or cold intolerance; No hair loss  MUSCULOSKELETAL: No joint pain or swelling; No muscle, back, or extremity pain  PSYCHIATRIC: No depression, anxiety, mood swings, or difficulty sleeping  HEME/LYMPH: No easy bruising, or bleeding gums  ALLERY AND IMMUNOLOGIC: No hives or eczema    MEDICATIONS  (STANDING):  pantoprazole    Tablet 40 milliGRAM(s) Oral two times a day before meals  sucralfate suspension 1 Gram(s) Oral four times a day  atorvastatin 20 milliGRAM(s) Oral at bedtime  tiotropium 18 MICROgram(s) Capsule 1 Capsule(s) Inhalation daily  multivitamin 1 Tablet(s) Oral daily  heparin  Injectable 5000 Unit(s) SubCutaneous every 12 hours  buDESOnide 160 MICROgram(s)/formoterol 4.5 MICROgram(s) Inhaler 2 Puff(s) Inhalation two times a day  nitroprusside Infusion 0.7 MICROgram(s)/kG/Min (6.3 mL/Hr) IV Continuous <Continuous>  potassium acid phosphate/sodium acid phosphate tablet (K-PHOS No. 2) 1 Tablet(s) Oral four times a day with meals  hydrALAZINE 50 milliGRAM(s) Oral three times a day    MEDICATIONS  (PRN):  ondansetron Injectable 4 milliGRAM(s) IV Push every 6 hours PRN Nausea and/or Vomiting  aluminum hydroxide/magnesium hydroxide/simethicone Suspension 30 milliLiter(s) Oral every 4 hours PRN Dyspepsia      PHYSICAL EXAM:  Vital Signs Last 24 Hrs  T(C): 36.2 (05 Sep 2017 08:00), Max: 37.1 (04 Sep 2017 19:00)  T(F): 97.2 (05 Sep 2017 08:00), Max: 98.8 (04 Sep 2017 19:00)  HR: 104 (05 Sep 2017 09:30) (86 - 112)  BP: --  BP(mean): --  RR: 26 (05 Sep 2017 09:30) (16 - 27)  SpO2: 97% (05 Sep 2017 09:30) (93% - 99%)  I&O's Summary    04 Sep 2017 07:01  -  05 Sep 2017 07:00  --------------------------------------------------------  IN: 1076.8 mL / OUT: 1150 mL / NET: -73.2 mL    05 Sep 2017 07:01  -  05 Sep 2017 10:37  --------------------------------------------------------  IN: 2.7 mL / OUT: 0 mL / NET: 2.7 mL      GENERAL: NAD, well-developed  HEAD:  Atraumatic, Normocephalic  EYES: EOMI, PERRLA, conjunctiva and sclera clear  NECK: Supple, No JVD  CHEST/LUNG: Poor AE  HEART: Regular rate and rhythm; No murmurs, rubs, or gallops  ABDOMEN: Soft, Nontender, Nondistended; Bowel sounds present  EXTREMITIES:  2+ Peripheral Pulses, No clubbing, cyanosis, or edema  PSYCH: AAOx3  NEUROLOGY: non-focal  SKIN: No rashes or lesions    LABS:  CAPILLARY BLOOD GLUCOSE                              12.4   5.6   )-----------( 115      ( 05 Sep 2017 04:30 )             37.8     09-05    135  |  101  |  25<H>  ----------------------------<  219<H>  4.1   |  23  |  0.82    Ca    8.4      05 Sep 2017 04:30  Phos  1.9     09-05  Mg     1.8     09-05    TPro  5.9<L>  /  Alb  3.1<L>  /  TBili  0.4  /  DBili  x   /  AST  27  /  ALT  57<H>  /  AlkPhos  103  09-05      CARDIAC MARKERS ( 05 Sep 2017 06:36 )  x     / 0.33 ng/mL / 96 U/L / x     / 2.2 ng/mL  CARDIAC MARKERS ( 03 Sep 2017 16:56 )  x     / 0.47 ng/mL / 90 U/L / x     / 2.2 ng/mL          RADIOLOGY & ADDITIONAL TESTS:    Imaging Personally Reviewed:    Consultant(s) Notes Reviewed:      Care Discussed with Consultants/Other Providers:

## 2017-09-05 NOTE — PROGRESS NOTE ADULT - ASSESSMENT
62M NICM  (LVDD 6.7 cm) with severely reduced LV systolic function (LVEF 10%), ACC/AHA stage D with NYHA class IV symptoms presenting with abdominal pain and evidence of low cardiac output/cardiogenic shock with severely depressed cardiac index and high filling pressures. He is currently stabilized on nitroprusside with significant improvement in cardiac output and normalization of his filling pressures, but his cardiac output remains low.     ADH (euvolemic, pure pump failure)  p100,   116/82  CVP 8-10, PA  55/30  (34) PCWP 21  - CI 1.3  , MV sat =49 today.   - much improved on SNP  - transition to oral agents.   - on hydralazine 50 TID  - increase hydral to 75 TID  - after two hours, add isosorbide dinitrate 10TID  - can uptitrate every 2-4 hours  -goal MAP 65-70.   - needs daily CXR while swan in place  - continue swan, will likely remove when at optimal PO afterload reduction.     LVAD eval  - we are planning for LVAD on this hospitalization, likely next week.  - Dr. Lerma to speak to pt today.   - transplant ID to see pt to r/o TB, chagas  - Will need COREY to assess MR, need for surgical intervention, ? mel bush

## 2017-09-05 NOTE — CHART NOTE - NSCHARTNOTEFT_GEN_A_CORE
Source: Patient [X ]    Family [ ]     other [ X] RN, medical record, MD.  phone (Alyssa ID#[id number])    Nutrition consult received for LAD evaluation. Pt seen, reports feeling well. Pt denies following any diet restrictions PTA but states he diet isn't high in salt. Breakfast: bagel with butter, coffee, banana. Lunch: (late lunch usually at a [facility] restaurant) salad, rice, chicken, turkey, pasta. Dinner: cereal with milk, read. Pt does not monitor Wt often at home, but states he has always been on the smaller side. Pt states for the last few days PTA his appetite was lower, but he didn't notice any Wt changes. Pt enjoys Ensure Enlive and has been drinking them 2x daily in-between meals. Pt was amenable to HF nutrition therapy education, written education materials left at Pt''s bedside. Pt denies chewing/swallowing difficulty. Denies micronutrient supplementation. NKFA     Per chart: Pt admitted with abdominal pain, hypotension and tachycardia. Pt with NICM, new onset acute systolic decompensated HF with EF of 10%, cardiogenic shock, and low output. Pt undergoing LVAD evaluation.     Diet : regular Ensure Enlive x2 daily.       Patient reports No GI distress, but states last BM on Friday.      PO intake:  90% of meals      Source for PO intake  [ X] chart      Current Weight: Weight (kg): 63.5kg, admission Wt: 66.1kg.   Pt reports UBW to be about 155lbs, states he lost Wt due to stress an admits to current Wt to be 142lbs. Pt's bed scale reads 139.9lbs today indicating a total Wt loss of 15lbs during an unspecified time frame. (10% Wt loss). Wt loss likely related to both decline in PO intake PTA, cardiac cachexia and fluids shifts.     Pertinent Medications: MEDICATIONS  (STANDING):  pantoprazole    Tablet 40 milliGRAM(s) Oral two times a day before meals  sucralfate suspension 1 Gram(s) Oral four times a day  atorvastatin 20 milliGRAM(s) Oral at bedtime  tiotropium 18 MICROgram(s) Capsule 1 Capsule(s) Inhalation daily  multivitamin 1 Tablet(s) Oral daily  heparin  Injectable 5000 Unit(s) SubCutaneous every 12 hours  buDESOnide 160 MICROgram(s)/formoterol 4.5 MICROgram(s) Inhaler 2 Puff(s) Inhalation two times a day  nitroprusside Infusion 0.7 MICROgram(s)/kG/Min (6.3 mL/Hr) IV Continuous <Continuous>  potassium acid phosphate/sodium acid phosphate tablet (K-PHOS No. 2) 1 Tablet(s) Oral four times a day with meals  hydrALAZINE 75 milliGRAM(s) Oral every 8 hours    MEDICATIONS  (PRN):  ondansetron Injectable 4 milliGRAM(s) IV Push every 6 hours PRN Nausea and/or Vomiting  aluminum hydroxide/magnesium hydroxide/simethicone Suspension 30 milliLiter(s) Oral every 4 hours PRN Dyspepsia    Pertinent Labs: Hgb/Hct:12.4/7.8-low, Na:135-, K:4.1, Cl:101, BUN:25-high, Cr:0.82, Glucose:219-high, Ca:8.4, Total Protein:5.9-low, Albumin:3.1, Prealbumin:22, c-reactive protein:0.30, Total Bilirubin:0.4, AlkPhos:103, AST:27, ALT:57-high, M.8, Phos:1.9     Skin: Intact.   Nutrition physical focused exam: Moderate muscle wasting: temporals, clavicles, deltoids, scapulas. Moderate fat wasting at triceps, and ribs.     Estimated Needs:   [ ] no change since previous assessment  [ X] recalculated: Based on dosing Wt; 60kg  25-30kcals/k-1800kcals  1.0-1.2gm/k-72gm protein.     Previous Nutrition Diagnosis:      [X ] Increased Nutrient Needs     Nutrition Diagnosis is [X ] ongoing, addressed with Ensure enlive x2        New Nutrition Diagnosis:   [ X] Malnutrition, Moderate.     Related to: decreased appetite and cardiac cachexia      As evidenced by: Moderate fat and muscle wasting, 10% Wt loss, decreased appetite,     Recommend    1. Change diet to low Na,   2. Continue Ensure Enlive x2 daily   3. Reviewed HF nutrition therapy   4. Encourage adequate PO intake   5. Malnutrition sticker placed in chat       Monitoring and Evaluation:     [X ] PO intake [ X] Tolerance to diet prescription [X ] weights [X ] follow up per protocol    [X ] other: RD remains available Sarah Siegler RD. Pager #[phone]

## 2017-09-05 NOTE — PROGRESS NOTE ADULT - ASSESSMENT
61 y/o man with a NIDCM with severely reduced LV systolic function, ACC/AHA stage D with NYHA class IV symptoms admitted with cardiogenic shock stabilized with afterload reduction.

## 2017-09-05 NOTE — CONSULT NOTE ADULT - SUBJECTIVE AND OBJECTIVE BOX
Psychology Assessment for Left Ventricular Assist Device     Information obtained from patient, chart, family member (sister Cristina) and using  phone (ID [id number])    PMH:  Mr. 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 He is currently stabilized on nitroprusside with significant improvement in cardiac output and normalization of his filling pressures, but his cardiac output remains low; LVAD evaluation initiated given his advanced disease, currently trying to transition him to oral vasodilators.     Patient Information:   Mr. Quispe is ; lives in his sister's house in [place].  He has 3 sons (2 live in , 1 in [facility]).   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  High school educated in [facility].         Capacity/understanding of procedure:   In process of learning about his heart disease and treatment options. No past medical history.  Fair understanding of heart failure, "my heart is weak." Unclear about treatment plan and need for possible LVAD.   Requested this writer speak with his sister (Cristina [phone]) and niece (Celestina [phone]) to further discuss his medical issues.  Left message for his niece and spoke to his sister who reports patient visited [facility] from 8/6/17 to 8/24/17 to visit family.  Admitted to hospital 8/25/17 with SOB and trouble walking.     Availability of Social Support System:   Identified several family members as primary support including his sister (Cristina Rudolph [phone]) niece (Celestina Rudolph-Dundee [phone]) and nephew (Miller Rudolph).   He lives with his sister and her family in basement.  His niece Celestina is a RN at [employer] and is available to do dressing changes and participate in LVAD education.  Her brother is available to accompany patient to MD appointments.      Emotional health/coping strategies:   Endorsed periods of low mood in setting of health issues, hospitalization and "personal problems" related to relationship issues with family member.  Became tearful at times when discussing these stressors.  Tends to "keep things to myself" and is not comfortable asking others for help or discussing his problems.  Endorsed periods of anxiety with increased worrying especially at night.   Denies panic attacks.       Substance  use:   Current smoker; smokes 8-10 cigarettes/day for past 30 years. Receptive to quitting.  Smoking cessation information to be provided.  Current alcohol use: 3-4 drinks of Henessey 2x/week; occasional beer in summertime.  Denies non-prescription drug use.      Cognitive/Mental Status Exam:   Pt seen resting in bed.   A&Ox3. Cooperative and pleasant.  Well related with good eye contact.  No abnormal body movement.  Speech normal rate and volume.  Thought process goal directed, logical.  Thought content: no evidence of any psychosis, kenan, delusions.  Mood anxious.  Affect constricted, tearful at times.   Insight and judgment adequate.      Impression: Psychology Assessment for Left Ventricular Assist Device     Information obtained from patient, chart, family member (sister Cristina) and using  phone (ID [id number])    PMH:  Mr. 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 He is currently stabilized on nitroprusside with significant improvement in cardiac output and normalization of his filling pressures, but his cardiac output remains low; LVAD evaluation initiated given his advanced disease, currently trying to transition him to oral vasodilators.     Patient Information:   Mr. Quispe is originally from [facility]; lived in  for many years and is [place] citizen.  Has 3 sons (1 lives in [facility], 1 in [place], 1 in [place]).  He currently lives with his sister and her family in [place].  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  High school educated in [facility].         Capacity/understanding of procedure:   In process of learning about his heart disease and treatment options. No past medical history.  Fair understanding of heart failure, "my heart is weak." Unclear about treatment plan and need for possible LVAD.   Requested this writer speak with his sister (Cristina [phone]) and niece (Celestina [phone]) to further discuss his medical issues.  Left message for his niece and spoke to his sister who reports patient visited [facility] from 8/6/17 to 8/24/17 to visit family.  Admitted to hospital 8/25/17 with SOB and trouble walking.     Availability of Social Support System:   Identified several family members as primary support including his sister (Cristina Rudolph [phone]) niece ([facility] [phone]) and nephew (Miller Rudolph).   He lives with his sister and her family in [facility].  His niece Celestina is a RN at [employer] and is available to do dressing changes and participate in LVAD education.  Her brother is available to accompany patient to MD appointments.      Emotional health/coping strategies:   Endorsed periods of low mood in setting of health issues, hospitalization and "personal problems" related to relationship issues with family member.  Became tearful at times when discussing these stressors.  Tends to "keep things to myself" and is not comfortable asking others for help or discussing his problems.  Endorsed periods of anxiety with increased worrying especially at night.   Denies panic attacks.       Substance  use:   Current smoker; smokes 8-10 cigarettes/day for past 30 years. Receptive to quitting.  Smoking cessation information to be provided.  Current alcohol use: 3-4 drinks of Henessey 2x/week; occasional beer in summertime.  Denies non-prescription drug use.      Cognitive/Mental Status Exam:   Pt seen resting in bed.   A&Ox3. Cooperative and pleasant.  Well related with good eye contact.  No abnormal body movement.  Speech normal rate and volume.  Thought process goal directed, logical.  Thought content: no evidence of any psychosis, kenan, delusions.  Mood anxious.  Affect constricted, tearful at times.   Insight and judgment adequate.      Impression:    63 y/o man with NICM, severely reduced LV systolic function (LVEF 10%), ACC/AHA stage D with NYHA class IV symptoms end stage heart failure, being evaluated for LVAD.  Information obtained from patient, chart, family members.   General understanding of heart disease ("my heart is weak"); in process of education on LVAD management.   Strong family support from sister and her family; he lives in basement of their home.    Niece (Celestina Rudolph) is an RN at [employer] and is the primary .   Endorsed periods of low mood and anxiety related to current stressors and worries about future wellbeing.   By nature not open to asking others for help and support but understands the necessity of doing so with LVAD.   Current smoker but indicated he has decided to quit.  Current alcohol use: 3-4 drinks of Henessey 2x/week; occasional beer in summertime.  Denies non-prescription drug use.      Dx:  Adjustment disorder with anxiety and depression     Assessment/Recommendations:  ·	General understanding of current health issues and reasons for LVAD.  In process of education on LVAD management.      ·	Strong family support from sister (Alyssa Rudolph [phone]); he lives in basement of her house.   Additional support from niece, nephews, cousins and another sister who also lives in [place].    Has 3 sons (1 in [facility], 2 in ).   Oldest son (Kang Quispe) lives in [place]; plans to visit for 1 week following surgery.      ·	Identified his niece (Celestina Rudolph) as primary support person.  Celestina is an RN at [employer] and will be available to take part in LVAD education; also available to do dressing changes weekly.        ·	Pt requested assistance with transportation post LVAD for MD visits.  His nephew (Miller Rudolph) lives with patient and will be available to accompany pt to MD follow up appointments but he does not own a car; other family members work.    ·	As Creole is patient’s primary language, would benefit from all information being explained using  phone or with niece present.      ·	Current smoker but indicated he has decided to quit.  Smoking cessation information provided.   Current alcohol use: 3-4 drinks of Henessey 2x/week; occasional beer in summertime.  Denies non-prescription drug use.      ·	Denies PPH.    Endorsed periods of low mood/ anxiety related to current stressors and worries about future wellbeing.  By nature not open to asking others for help and support but understands the necessity of doing so with LVAD.       ·	Brief cognitive screen to be completed later this week.  Pt reports high school education in [place].

## 2017-09-05 NOTE — PROGRESS NOTE ADULT - SUBJECTIVE AND OBJECTIVE BOX
Patient is a 62y old  Male who presents with a chief complaint of SOOB (25 Aug 2017 22:27)  not feeling well today feels more SOB : tells me  no abdominal pain     Any change in ROS:     MEDICATIONS  (STANDING):  pantoprazole    Tablet 40 milliGRAM(s) Oral two times a day before meals  sucralfate suspension 1 Gram(s) Oral four times a day  atorvastatin 20 milliGRAM(s) Oral at bedtime  tiotropium 18 MICROgram(s) Capsule 1 Capsule(s) Inhalation daily  multivitamin 1 Tablet(s) Oral daily  heparin  Injectable 5000 Unit(s) SubCutaneous every 12 hours  buDESOnide 160 MICROgram(s)/formoterol 4.5 MICROgram(s) Inhaler 2 Puff(s) Inhalation two times a day  nitroprusside Infusion 0.7 MICROgram(s)/kG/Min (6.3 mL/Hr) IV Continuous <Continuous>  potassium acid phosphate/sodium acid phosphate tablet (K-PHOS No. 2) 1 Tablet(s) Oral four times a day with meals  hydrALAZINE 75 milliGRAM(s) Oral every 8 hours  senna 2 Tablet(s) Oral at bedtime  docusate sodium 100 milliGRAM(s) Oral three times a day  polyethylene glycol 3350 17 Gram(s) Oral daily    MEDICATIONS  (PRN):  ondansetron Injectable 4 milliGRAM(s) IV Push every 6 hours PRN Nausea and/or Vomiting  aluminum hydroxide/magnesium hydroxide/simethicone Suspension 30 milliLiter(s) Oral every 4 hours PRN Dyspepsia    Vital Signs Last 24 Hrs  T(C): 36.2 (05 Sep 2017 08:00), Max: 37.1 (04 Sep 2017 19:00)  T(F): 97.2 (05 Sep 2017 08:00), Max: 98.8 (04 Sep 2017 19:00)  HR: 110 (05 Sep 2017 12:00) (86 - 112)  BP: --  BP(mean): --  RR: 28 (05 Sep 2017 12:00) (16 - 28)  SpO2: 97% (05 Sep 2017 12:00) (93% - 99%)    I&O's Summary    04 Sep 2017 07:01  -  05 Sep 2017 07:00  --------------------------------------------------------  IN: 1076.8 mL / OUT: 1150 mL / NET: -73.2 mL    05 Sep 2017 07:01  -  05 Sep 2017 12:34  --------------------------------------------------------  IN: 2.7 mL / OUT: 0 mL / NET: 2.7 mL          Physical Exam:   GENERAL: NAD, well-groomed, well-developed  HEENT: ALONSO/   Atraumatic, Normocephalic  ENMT: No tonsillar erythema, exudates, or enlargement; Moist mucous membranes, Good dentition, No lesions  NECK: Supple, No JVD, Normal thyroid  CHEST/LUNG: por air entry   CVS: Regular rate and rhythm; No murmurs, rubs, or gallops  GI: : Soft, Nontender, Nondistended; Bowel sounds present  NERVOUS SYSTEM:  Alert & Oriented X3  EXTREMITIES:  2+ Peripheral Pulses, No clubbing, cyanosis, or edema  LYMPH: No lymphadenopathy noted  SKIN: No rashes or lesions  ENDOCRINOLOGY: No Thyromegaly  PSYCH: Appropriate    Labs:  ABG - ( 05 Sep 2017 09:25 )  pH: 7.51  /  pCO2: 22    /  pO2: 94    / HCO3: 18    / Base Excess: -3.6  /  SaO2: 98                CARDIAC MARKERS ( 05 Sep 2017 06:36 )  x     / 0.33 ng/mL / 96 U/L / x     / 2.2 ng/mL  CARDIAC MARKERS ( 03 Sep 2017 16:56 )  x     / 0.47 ng/mL / 90 U/L / x     / 2.2 ng/mL                            12.4   5.6   )-----------( 115      ( 05 Sep 2017 04:30 )             37.8                         12.9   5.8   )-----------( 106      ( 04 Sep 2017 04:30 )             39.1                         14.1   5.4   )-----------( 110      ( 03 Sep 2017 15:08 )             43.0                         13.2   5.5   )-----------( 110      ( 03 Sep 2017 04:21 )             40.0                         12.7   6.2   )-----------( 114      ( 02 Sep 2017 21:13 )             38.4                         13.8   5.0   )-----------( 123      ( 02 Sep 2017 12:55 )             41.3                         13.2   4.6   )-----------( 112      ( 02 Sep 2017 05:18 )             38.4                         14.8   5.1   )-----------( 136      ( 01 Sep 2017 22:43 )             43.5     09-05    135  |  101  |  25<H>  ----------------------------<  219<H>  4.1   |  23  |  0.82  09-04    137  |  103  |  18  ----------------------------<  104<H>  4.3   |  24  |  0.85  09-03    139  |  103  |  29<H>  ----------------------------<  110<H>  4.1   |  25  |  0.87  09-02    136  |  101  |  34<H>  ----------------------------<  147<H>  4.0   |  26  |  1.09  09-02    137  |  98  |  38<H>  ----------------------------<  116<H>  4.1   |  27  |  1.20  09-01    132<L>  |  95<L>  |  40<H>  ----------------------------<  202<H>  4.9   |  22  |  1.28    Ca    8.4      05 Sep 2017 04:30  Ca    8.5      04 Sep 2017 04:30  Phos  1.9     09-05  Phos  2.4     09-04  Mg     1.8     09-05  Mg     1.9     09-04    TPro  5.9<L>  /  Alb  3.1<L>  /  TBili  0.4  /  DBili  x   /  AST  27  /  ALT  57<H>  /  AlkPhos  103  09-05  TPro  5.9<L>  /  Alb  3.2<L>  /  TBili  0.5  /  DBili  x   /  AST  27  /  ALT  65<H>  /  AlkPhos  92  09-04  TPro  6.0  /  Alb  3.2<L>  /  TBili  0.5  /  DBili  x   /  AST  40  /  ALT  85<H>  /  AlkPhos  108  09-03  TPro  5.8<L>  /  Alb  3.2<L>  /  TBili  0.4  /  DBili  x   /  AST  42<H>  /  ALT  85<H>  /  AlkPhos  103  09-02  TPro  5.7<L>  /  Alb  3.1<L>  /  TBili  0.5  /  DBili  x   /  AST  50<H>  /  ALT  98<H>  /  AlkPhos  102  09-02  TPro  6.4  /  Alb  3.5  /  TBili  0.6  /  DBili  x   /  AST  58<H>  /  ALT  118<H>  /  AlkPhos  121<H>  09-01    CAPILLARY BLOOD GLUCOSE          LIVER FUNCTIONS - ( 05 Sep 2017 04:30 )  Alb: 3.1 g/dL / Pro: 5.9 g/dL / ALK PHOS: 103 U/L / ALT: 57 U/L RC / AST: 27 U/L / GGT: x               Lactate, Blood: 2.9 mmol/L (09-01 @ 13:04)  Cultures:                             Studies  Chest X-RAY  CT SCAN Chest   Venous Dopplers: LE:   CT Abdomen  Others      < from: Xray Chest 1 View AP- PORTABLE-Urgent (09.05.17 @ 07:38) >  INTERPRETATION:  A single chest x-ray was obtained on September 4, 2017.    Indication: Chest pain.    Impression:    The heart is enlarged. Bilateral pleural effusion. Pulmonary vascular   congestion. A Glasford-Tariq catheter is seen on the right and the tip is   wedged in the right pulmonary artery. No pneumothorax. No change in the   appearance of the chest when compared to previous study done September 2, 2017.                    ANIRUDH HUMPHREYS M.D., ATTENDING RADIOLOGIST  This document has been electronically signed. Sep  5 2017  9:42AM    < end of copied text >

## 2017-09-05 NOTE — PROGRESS NOTE ADULT - PROBLEM SELECTOR PLAN 1
On Nipride Infusion. Cardiac parameters monitoring indicates an improvement in CO and CI, again today. [facility] team

## 2017-09-05 NOTE — CHART NOTE - NSCHARTNOTEFT_GEN_A_CORE
====================  CCU MIDNIGHT ROUNDS  ====================    RICKY JOINT  [id number]  Patient is a 62y old  Male who presents with a chief complaint of SOB (25 Aug 2017 22:27)      ====================  SUMMARY:  ====================    Patient is admitted for acute systolic HF s/p RHC with CO = 1.15 and CI = 0.63 and EF 10%.     ====================  NEW EVENTS:  ====================    Patient denies any CP SOB ERICK orthopnea, f nvd. Switched to 50 TID hydralazine. Titrating off nitroprusside.    ====================  VITALS (Last 12 hrs):  ====================    T(C): 36.7 (09-05-17 @ 19:00), Max: 36.9 (09-05-17 @ 16:30)  T(F): 98.1 (09-05-17 @ 19:00), Max: 98.4 (09-05-17 @ 16:30)  HR: 108 (09-05-17 @ 22:30) (106 - 118)  BP: 111/82 (09-05-17 @ 13:00) (111/82 - 111/82)  BP(mean): 90 (09-05-17 @ 13:00) (90 - 90)  ABP: 106/68 (09-05-17 @ 22:30) (98/50 - 136/84)  ABP(mean): 80 (09-05-17 @ 22:30) (66 - 100)  RR: 25 (09-05-17 @ 22:30) (21 - 37)  SpO2: 97% (09-05-17 @ 22:30) (94% - 99%)  Wt(kg): --  CVP(mm Hg): 4 (09-05-17 @ 22:30) (-2 - 19)  CVP(cm H2O): --  CO: 3.7 (09-05-17 @ 17:00) (3.7 - 3.7)  CI: 2.1 (09-05-17 @ 17:00) (2.1 - 2.1)  PA: 50/19 (09-05-17 @ 22:30) (42/12 - 66/34)  PA(mean): 32 (09-05-17 @ 22:30) (25 - 47)  PCWP: --  SVR: 1555 (09-05-17 @ 17:00) (1555 - 1555)  PVR: 281 (09-05-17 @ 17:00) (281 - 281)    I&O's Summary    04 Sep 2017 07:01  -  05 Sep 2017 07:00  --------------------------------------------------------  IN: 1076.8 mL / OUT: 1150 mL / NET: -73.2 mL    05 Sep 2017 07:01  -  05 Sep 2017 23:30  --------------------------------------------------------  IN: 758.2 mL / OUT: 3000 mL / NET: -2241.8 mL            ====================  NEW LABS:  ====================                          12.4   5.6   )-----------( 115      ( 05 Sep 2017 04:30 )             37.8     09-05    135  |  101  |  25<H>  ----------------------------<  219<H>  4.1   |  23  |  0.82    Ca    8.4      05 Sep 2017 04:30  Phos  1.9     09-05  Mg     1.8     09-05    TPro  5.9<L>  /  Alb  3.1<L>  /  TBili  0.4  /  DBili  x   /  AST  27  /  ALT  57<H>  /  AlkPhos  103  09-05      Creatine Kinase, Serum: 96 U/L (09-05-17 @ 06:36)  Troponin T, Serum: 0.33 ng/mL <H> (09-05-17 @ 06:36)    CKMB Units: 2.2 ng/mL (09-05 @ 06:36)    ABG - ( 05 Sep 2017 09:25 )  pH: 7.51  /  pCO2: 22    /  pO2: 94    / HCO3: 18    / Base Excess: -3.6  /  SaO2: 98          ====================  PLAN:  ====================  - Continue Hydralazine for afterload reduction  - Continue Nitroprusside drip and titrate as tolerated, transition to orals.  - Monitor I & O's and electrolytes; replete as needed  - LVAD w/u per HF  - OOB to chair as tolerated        Dion Hernandez MD  PGY2 Internal Medicine Resident  Pager: [phone] - [place]  [phone] - [facility] ====================  CCU MIDNIGHT ROUNDS  ====================    RICKY JOINT  [id number]  Patient is a 62y old  Male who presents with a chief complaint of SOB (25 Aug 2017 22:27)      ====================  SUMMARY:  ====================    Patient is admitted for acute systolic HF s/p RHC with CO = 1.15 and CI = 0.63 and EF 10%.     ====================  NEW EVENTS:  ====================    Patient denies any CP SOB ERICK orthopnea, f nvd. Switched to 50 TID hydralazine. Titrating off nitroprusside. CO = 4.34, Cardiac Index= 2.48.    ====================  VITALS (Last 12 hrs):  ====================    T(C): 36.7 (09-05-17 @ 19:00), Max: 36.9 (09-05-17 @ 16:30)  T(F): 98.1 (09-05-17 @ 19:00), Max: 98.4 (09-05-17 @ 16:30)  HR: 108 (09-05-17 @ 22:30) (106 - 118)  BP: 111/82 (09-05-17 @ 13:00) (111/82 - 111/82)  BP(mean): 90 (09-05-17 @ 13:00) (90 - 90)  ABP: 106/68 (09-05-17 @ 22:30) (98/50 - 136/84)  ABP(mean): 80 (09-05-17 @ 22:30) (66 - 100)  RR: 25 (09-05-17 @ 22:30) (21 - 37)  SpO2: 97% (09-05-17 @ 22:30) (94% - 99%)  Wt(kg): --  CVP(mm Hg): 4 (09-05-17 @ 22:30) (-2 - 19)  CVP(cm H2O): --  CO: 3.7 (09-05-17 @ 17:00) (3.7 - 3.7)  CI: 2.1 (09-05-17 @ 17:00) (2.1 - 2.1)  PA: 50/19 (09-05-17 @ 22:30) (42/12 - 66/34)  PA(mean): 32 (09-05-17 @ 22:30) (25 - 47)  PCWP: --  SVR: 1555 (09-05-17 @ 17:00) (1555 - 1555)  PVR: 281 (09-05-17 @ 17:00) (281 - 281)    I&O's Summary    04 Sep 2017 07:01  -  05 Sep 2017 07:00  --------------------------------------------------------  IN: 1076.8 mL / OUT: 1150 mL / NET: -73.2 mL    05 Sep 2017 07:01  -  05 Sep 2017 23:30  --------------------------------------------------------  IN: 758.2 mL / OUT: 3000 mL / NET: -2241.8 mL            ====================  NEW LABS:  ====================                          12.4   5.6   )-----------( 115      ( 05 Sep 2017 04:30 )             37.8     09-05    135  |  101  |  25<H>  ----------------------------<  219<H>  4.1   |  23  |  0.82    Ca    8.4      05 Sep 2017 04:30  Phos  1.9     09-05  Mg     1.8     09-05    TPro  5.9<L>  /  Alb  3.1<L>  /  TBili  0.4  /  DBili  x   /  AST  27  /  ALT  57<H>  /  AlkPhos  103  09-05      Creatine Kinase, Serum: 96 U/L (09-05-17 @ 06:36)  Troponin T, Serum: 0.33 ng/mL <H> (09-05-17 @ 06:36)    CKMB Units: 2.2 ng/mL (09-05 @ 06:36)    ABG - ( 05 Sep 2017 09:25 )  pH: 7.51  /  pCO2: 22    /  pO2: 94    / HCO3: 18    / Base Excess: -3.6  /  SaO2: 98          ====================  PLAN:  ====================  - Continue Hydralazine for afterload reduction  - Continue Nitroprusside drip and titrate as tolerated, transition to orals.  - Monitor I & O's and electrolytes; replete as needed  - LVAD w/u per HF  - OOB to chair as tolerated        Dion Hernandez MD  PGY2 Internal Medicine Resident  Pager: [phone] - [place]  [phone] - [facility]

## 2017-09-05 NOTE — PROGRESS NOTE ADULT - SUBJECTIVE AND OBJECTIVE BOX
INTERVAL HPI/OVERNIGHT EVENTS:        tolerating diet  still having abdominal pain  no bowel movements since friday    MEDICATIONS  (STANDING):  heparin  Injectable 5000 Unit(s) SubCutaneous every 8 hours  pantoprazole    Tablet 40 milliGRAM(s) Oral two times a day before meals  sucralfate suspension 1 Gram(s) Oral four times a day  furosemide   Injectable 40 milliGRAM(s) IV Push two times a day    MEDICATIONS  (PRN):  ondansetron Injectable 4 milliGRAM(s) IV Push every 6 hours PRN Nausea and/or Vomiting      Allergies    No Known Allergies    Intolerances        ROS:   General:  No wt loss, fevers, chills, night sweats, fatigue,   Eyes:  Good vision, no reported pain  ENT:  No sore throat, pain, runny nose, dysphagia  CV:  No pain, palpitations, hypo/hypertension  Resp:  No dyspnea, cough, tachypnea, wheezing  GI:  No pain, No nausea, No vomiting, No diarrhea, No constipation, No weight loss, No fever, No pruritis, No rectal bleeding, No tarry stools, No dysphagia,  :  No pain, bleeding, incontinence, nocturia  Muscle:  No pain, weakness  Neuro:  No weakness, tingling, memory problems  Psych:  No fatigue, insomnia, mood problems, depression  Endocrine:  No polyuria, polydipsia, cold/heat intolerance  Heme:  No petechiae, ecchymosis, easy bruisability  Skin:  No rash, tattoos, scars, edema      PHYSICAL EXAM:   Vital Signs:  Vital Signs Last 24 Hrs  T(C): 36.6 (28 Aug 2017 12:05), Max: 36.6 (27 Aug 2017 21:17)  T(F): 97.8 (28 Aug 2017 12:05), Max: 97.8 (27 Aug 2017 21:17)  HR: 104 (28 Aug 2017 12:05) (97 - 104)  BP: 109/82 (28 Aug 2017 12:05) (97/67 - 109/82)  BP(mean): --  RR: 18 (28 Aug 2017 12:05) (18 - 18)  SpO2: 93% (28 Aug 2017 12:05) (93% - 97%)  Daily     Daily Weight in k.5 (28 Aug 2017 04:17)    GENERAL:  Appears stated age, well-groomed, well-nourished, no distress  HEENT:  NC/AT,  conjunctivae clear and pink, no thyromegaly, nodules, adenopathy, no JVD, sclera -anicteric  CHEST:  Full & symmetric excursion, no increased effort, breath sounds clear  HEART:  Regular rhythm, S1, S2, no murmur/rub/S3/S4, no abdominal bruit, no edema  ABDOMEN:  Soft, non-tender, non-distended, normoactive bowel sounds,  no masses ,no hepato-splenomegaly, no signs of chronic liver disease  EXTEREMITIES:  no cyanosis,clubbing or edema  SKIN:  No rash/erythema/ecchymoses/petechiae/wounds/abscess/warm/dry  NEURO:  Alert, oriented, no asterixis, no tremor, no encephalopathy      LABS:                        14.6   4.8   )-----------( 149      ( 27 Aug 2017 09:18 )             44.7             Urinalysis Basic - ( 27 Aug 2017 18:36 )    Color: Yellow / Appearance: Clear / SG: >1.030 / pH: x  Gluc: x / Ketone: Trace  / Bili: Negative / Urobili: Negative   Blood: x / Protein: 30 mg/dL / Nitrite: Negative   Leuk Esterase: Negative / RBC: 2-5 /HPF / WBC 0-2 /HPF   Sq Epi: x / Non Sq Epi: x / Bacteria: x        RADIOLOGY & ADDITIONAL TESTS:

## 2017-09-05 NOTE — CHART NOTE - NSCHARTNOTEFT_GEN_A_CORE
Upon Nutritional Assessment by the Registered Dietitian your patient was determined to meet criteria / has evidence of the following diagnosis/diagnoses:          [ ]  Mild Protein Calorie Malnutrition        [ X]  Moderate Protein Calorie Malnutrition        [ ] Severe Protein Calorie Malnutrition        [ ] Unspecified Protein Calorie Malnutrition        [ ] Underweight / BMI <19        [ ] Morbid Obesity / BMI > 40      Findings as based on:  [X ] Comprehensive nutrition assessment: 10% Wt loss   [X ] Nutrition Focused Physical Exam: moderate fat and muscle wasting   [X ] Other:: decline in PO intake, cardiac cachexia       Nutrition Plan/Recommendations:      change diet to Low Na, Continue Ensure Enlive x2 to supplement intake     PROVIDER Section:     By signing this assessment you are acknowledging and agree with the diagnosis/diagnoses assigned by the Registered Dietitian    Comments:

## 2017-09-05 NOTE — PROGRESS NOTE ADULT - SUBJECTIVE AND OBJECTIVE BOX
Patient is a 62y old  Male who presents with a chief complaint of SOB (25 Aug 2017 22:27)    HPI:    Patient is a 62 years old Creole speaking Male with no no significant PMH who presented 6 days ago with SOB and chest pain. Patient states that he had no hx of heart failure or MI. He was vacationing in [facility] for the past month and his symptoms began a week prior to admission. He endorses chest pain which was substernal, non radiating and worse with exertion along with non productive cough and PND. No orthopnea or weight changes. He also endorses anorexia and weight loss. No recent sickness per se and no known sick contacts. He sought medical attention in [facility] but refused to be admitted to the hospital there. Patient [place] back [place] the [place] on [place] and came to [facility] ED. Patient found to be tachycardiac and hypotensive.     During his hospital stay, patient had had a negative CTA but a newly diagnosed systolic CHF with EF 10-15% and severely dilated LA, Severe MR, TR. Patient has been on IV lasix with less than ideal urinary output. RHC done today showed PA systolic pressure of 54, LVEDP 32, CO 1.15, CI 0.63 and EF 10%. Admitted to CCU for close monitoring and diuresis. Cardiac MRI negative for LV thrombus      Overnight Event: C.I. Improving.     REVIEW OF SYSTEMS:    GEN: no fevers, chills, no night sweats no weight loss , no swollen lymph nodes    EYES: No blurry vision , no changes in vision  ENT: no sores, no runny nose, no sore throat   PULM: no cough, no shortness of breath   CARD: no chest pain, no palpitations    GI : no abdominal pain , no nausea, no vomiting  : no burning urination, no change in color of urine, no flank pain, no blood in urine   MSK: no swelling   SKIN: no bruising or bleeding, no sores in mouth  , no yellowing of the skin  Neuro: no lightheadedness, no headaches, no weakness, no fainting, no confusion , no seizures    MEDICATIONS  (STANDING):  MEDICATIONS  (STANDING):  pantoprazole    Tablet 40 milliGRAM(s) Oral two times a day before meals  sucralfate suspension 1 Gram(s) Oral four times a day  atorvastatin 20 milliGRAM(s) Oral at bedtime  tiotropium 18 MICROgram(s) Capsule 1 Capsule(s) Inhalation daily  multivitamin 1 Tablet(s) Oral daily  heparin  Injectable 5000 Unit(s) SubCutaneous every 12 hours  buDESOnide 160 MICROgram(s)/formoterol 4.5 MICROgram(s) Inhaler 2 Puff(s) Inhalation two times a day  nitroprusside Infusion 0.7 MICROgram(s)/kG/Min (6.3 mL/Hr) IV Continuous <Continuous>  potassium acid phosphate/sodium acid phosphate tablet (K-PHOS No. 2) 1 Tablet(s) Oral four times a day with meals  hydrALAZINE 50 milliGRAM(s) Oral three times a day    MEDICATIONS  (PRN):  ondansetron Injectable 4 milliGRAM(s) IV Push every 6 hours PRN Nausea and/or Vomiting  aluminum hydroxide/magnesium hydroxide/simethicone Suspension 30 milliLiter(s) Oral every 4 hours PRN Dyspepsia      PHYSICAL EXAM:    ICU Vital Signs Last 24 Hrs  T(C): 36.4 (05 Sep 2017 06:00), Max: 37.1 (04 Sep 2017 19:00)  T(F): 97.5 (05 Sep 2017 06:00), Max: 98.8 (04 Sep 2017 19:00)  HR: 102 (05 Sep 2017 06:30) (86 - 112)  BP: --  BP(mean): --  ABP: 114/86 (05 Sep 2017 06:30) (76/58 - 126/72)  ABP(mean): 94 (05 Sep 2017 06:30) (66 - 94)  RR: 26 (05 Sep 2017 06:30) (16 - 27)  SpO2: 98% (05 Sep 2017 06:30) (92% - 98%)      Is/Os: Net neg -75cc    PHYSICAL EXAM:  General: No distress. Comfortable.  HEENT: EOM intact.  Neck: Neck supple. JVP not elevated. No masses  Chest: Clear to auscultation bilaterally  CV: Parasternal lift. Regular rate and rhythm with normal S1 and S2. + S3. Radial pulses normal. No edema.   Abdomen: Soft, non-distended, non-tender  Skin: No rashes or skin breakdown  Neurology: Alert and oriented times three. Sensation intact  Psych: Affect withdrawn    LABS:	 	                                              12.4   5.6   )-----------( 115      ( 05 Sep 2017 04:30 )             37.8     09-05    135  |  101  |  25<H>  ----------------------------<  219<H>  4.1   |  23  |  0.82    Ca    8.4      05 Sep 2017 04:30  Phos  1.9     09-05  Mg     1.8     09-05    TPro  5.9<L>  /  Alb  3.1<L>  /  TBili  0.4  /  DBili  x   /  AST  27  /  ALT  57<H>  /  AlkPhos  103  09-05        < from: TTE with Doppler (w/Cont) (08.28.17 @ 22:35) >  Dimensions:    Normal Values:  LA:     4.2    2.0 - 4.0 cm  Ao:     3.3    2.0 - 3.8 cm  SEPTUM: 0.7    0.6 - 1.2 cm  PWT:    0.8    0.6 - 1.1 cm  LVIDd:  6.7    3.0 - 5.6 cm  LVIDs:  6.4    1.8 - 4.0 cm  Derived variables:  LVMI: 115 g/m2  RWT: 0.23  Fractional short: 4 %  EF (Visual Estimate): 10-15 %  ------------------------------------------------------------------------  Observations:  Mitral Valve: Thickened mitral valve. Dilated mitral valve  annulus. Tethered posterior leaflet.  Severe, eccentric,  posteriorly-directed mitral regurgitation. Flow reversal is  seen in the pulmonary veins.  Aortic Valve/Aorta: Thickened aortic valve.  Aortic Root: 3.3 cm.  Left Atrium: Severely dilatedleft atrium.  LA volume index  = 63 cc/m2.  Left Ventricle: Endocardial visualization enhanced with  intravenous injection of echo contrast (Definity). Severe  global left ventricular systolic dysfunction with regional  variation (the mid to distal anterior wall and septum are  akinetic). Spontaneous echocardiographic contrast is seen  in the left ventricle. Ill-defined mass at the apex of the  left ventricle. May be left ventricular trabeculation or  thrombus. Consider MRI to further evaluate. Moderate to  severe left ventricular enlargement. E/e' equals 20,  consistent with elevated left ventricular end diastolic  pressure.  Right Heart: Moderate right atrial enlargement. Right  ventricular enlargement with decreased right ventricular  systolic function.  Diastolic septal flattening consistent  with right ventricular volume overload.  Thickened  tricuspid valve. Moderate-severe tricuspid regurgitation.  Normal pulmonic valve. Mild pulmonic regurgitation.  Pericardium/Pleura: Trace pericardial effusion.  Bilateral pleural effusions.  ------------------------------------------------------------------------  Conclusions:  1. Thickened mitral valve. Dilated mitral valve annulus.  Tethered posterior leaflet.  Severe, eccentric,  posteriorly-directed mitral regurgitation. Flow reversal is  seen in the pulmonary veins.  2. Thickened aortic valve.  3. Severely dilated left atrium.  4. Moderate to severe left ventricular enlargement.  5. Endocardial visualization enhanced with intravenous  injection of echo contrast (Definity). Severe global left  ventricular systolic dysfunction with regional variation  (the mid to distal anterior wall and septum are akinetic).  Spontaneous echocardiographic contrast is seen in the left  ventricle. Ill-defined, mass at the apex of the left  ventricle. May be left ventricular trabeculation or  thrombus. Consider MRI to further evaluate.  6. E/e' equals 20, consistent with elevated left  ventricular end diastolic pressure.  7. Moderate right atrial enlargement.  8. Right ventricular enlargement with decreased right  ventricular systolic function.  Diastolic septal flattening  consistent with right ventricular volume overload.  9. Thickened tricuspid valve. Moderate-severe tricuspid  regurgitation.  10. Trace pericardial effusion.  ------------------------------------------------------------------------  Confirmed on  8/28/2017 - 15:54:57 by Matias Stubbs M.D.  ------------------------------------------------------------------------    < end of copied text >

## 2017-09-05 NOTE — PROGRESS NOTE ADULT - PROBLEM SELECTOR PLAN 2
on nitroprusside drip for cardiogenic shock: BP is ok   monitor in CCU: on no diuretics: hydralazine is uptitrated as well as ISDN being added: eventual LVAD   LVAD per cardiology

## 2017-09-05 NOTE — PROGRESS NOTE ADULT - SUBJECTIVE AND OBJECTIVE BOX
24H hour events: No acute events in last 24 hours. Patient seen in bed, states feels well, improved since admission.   His belly pain resolved.     MEDICATIONS:  heparin  Injectable 5000 Unit(s) SubCutaneous every 12 hours  nitroprusside Infusion 0.7 MICROgram(s)/kG/Min IV Continuous <Continuous>  hydrALAZINE 50 milliGRAM(s) Oral three times a day      tiotropium 18 MICROgram(s) Capsule 1 Capsule(s) Inhalation daily  buDESOnide 160 MICROgram(s)/formoterol 4.5 MICROgram(s) Inhaler 2 Puff(s) Inhalation two times a day    ondansetron Injectable 4 milliGRAM(s) IV Push every 6 hours PRN    pantoprazole    Tablet 40 milliGRAM(s) Oral two times a day before meals  sucralfate suspension 1 Gram(s) Oral four times a day  aluminum hydroxide/magnesium hydroxide/simethicone Suspension 30 milliLiter(s) Oral every 4 hours PRN    atorvastatin 20 milliGRAM(s) Oral at bedtime    multivitamin 1 Tablet(s) Oral daily  potassium acid phosphate/sodium acid phosphate tablet (K-PHOS No. 2) 1 Tablet(s) Oral four times a day with meals        PHYSICAL EXAM:  T(C): 36.2 (09-05-17 @ 08:00), Max: 37.1 (09-04-17 @ 19:00)  HR: 104 (09-05-17 @ 09:30) (86 - 112)  BP: --  RR: 26 (09-05-17 @ 09:30) (16 - 27)  SpO2: 97% (09-05-17 @ 09:30) (93% - 99%)  Wt(kg): --  I&O's Summary    04 Sep 2017 07:01  -  05 Sep 2017 07:00  --------------------------------------------------------  IN: 1076.8 mL / OUT: 1150 mL / NET: -73.2 mL    05 Sep 2017 07:01  -  05 Sep 2017 10:41  --------------------------------------------------------  IN: 2.7 mL / OUT: 0 mL / NET: 2.7 mL        Appearance: Normal	  HEENT:   Normal oral mucosa  Lymphatic: No lymphadenopathy  Cardiovascular: Normal S1 S2,         No JVD,      No murmurs,      No edema  Respiratory: Lungs clear to auscultation	  Psychiatry: Mood & affect appropriate  Gastrointestinal:  Soft, Non-tender	  Skin: No rashes, No ecchymoses, No cyanosis	  Neurologic: Non-focal  Extremities: Normal range of motion, No clubbing, cyanosis   Vascular: warm extremities        LABS:	 	    CBC Full  -  ( 05 Sep 2017 04:30 )  WBC Count : 5.6 K/uL  Hemoglobin : 12.4 g/dL  Hematocrit : 37.8 %  Platelet Count - Automated : 115 K/uL  Mean Cell Volume : 99.8 fl  Mean Cell Hemoglobin : 32.7 pg  Mean Cell Hemoglobin Concentration : 32.7 gm/dL  Auto Neutrophil # : 3.1 K/uL  Auto Lymphocyte # : 1.5 K/uL  Auto Monocyte # : 0.6 K/uL  Auto Eosinophil # : 0.3 K/uL  Auto Basophil # : 0.1 K/uL  Auto Neutrophil % : 55.3 %  Auto Lymphocyte % : 27.3 %  Auto Monocyte % : 9.9 %  Auto Eosinophil % : 6.1 %  Auto Basophil % : 1.4 %    09-05    135  |  101  |  25<H>  ----------------------------<  219<H>  4.1   |  23  |  0.82  09-04    137  |  103  |  18  ----------------------------<  104<H>  4.3   |  24  |  0.85    Ca    8.4      05 Sep 2017 04:30  Ca    8.5      04 Sep 2017 04:30  Phos  1.9     09-05  Phos  2.4     09-04  Mg     1.8     09-05  Mg     1.9     09-04    TPro  5.9<L>  /  Alb  3.1<L>  /  TBili  0.4  /  DBili  x   /  AST  27  /  ALT  57<H>  /  AlkPhos  103  09-05  TPro  5.9<L>  /  Alb  3.2<L>  /  TBili  0.5  /  DBili  x   /  AST  27  /  ALT  65<H>  /  AlkPhos  92  09-04      proBNP:     TELEMETRY: 	  In the last 24 hours, there was normal sinus rhythm on telemetry, the heart rate varies between 100-100. There was occasional NSVT

## 2017-09-05 NOTE — CONSULT NOTE ADULT - SUBJECTIVE AND OBJECTIVE BOX
ID CONSULTATION-- Morris Prater [phone]    Patient is a 62y old  Male who presents with a chief complaint of SOB (25 Aug 2017 22:27) He has advanced CHF and is being evaluated for an LVAD placement. ID asked to see patient to r/o infection. Patient's history obtained through the use of [employer]  service    HPI:  62M with no PMHx here with chest pain. Began last week, described as pleuritic, worsens with cough and deep inspiration. Has been having SOB as well, dry cough. Recently vacationed in [facility] came back today. Saw doctor in [facility] but unclear what workup was. No fever, sick contacts, abd pain. CP described as midsternal, nonradiating. Has 25 pack year smoking history.  CTPA does not reveal PE, Pulm congestion noted. (25 Aug 2017 22:27)      PAST MEDICAL & SURGICAL HISTORY:  No pertinent past medical history  No significant past surgical history  Broken leg after a shopping cart fell on it    SOCIAL:  [facility] [place], but he reports feeling SOB a day after arrival    FAMILY HISTORY:  No pertinent family history in first degree relatives    REVIEW OF SYSTEMS  General:	Denies any malaise fatigue or chills. Fevers absent    Skin:No rash  	  Ophthalmologic:Denies any visual complaints,discharge redness or photophobia  	  ENMT:No nasal discharge,headache,sinus congestion or throat pain.No dental complaints    Respiratory and Thorax:No cough,sputum or chest pain. shortness of breath with minimal exertion  	  Cardiovascular:	No chest pain,palpitations or dizziness    Gastrointestinal:	NO nausea,abdominal pain or diarrhea.    Genitourinary:	No dysuria,frequency. No flank pain    Musculoskeletal:	No joint swelling or pain.No weakness    Neurological:No confusion,diziness.No extremity weakness.No bladder or bowel incontinence	    Psychiatric:No delusions or hallucinations	    Hematology/Lymphatics:	No LN swelling.No gum bleeding     Endocrine:	No recent weight gain or loss.No abnormal heat/cold intolerance    Allergic/Immunologic:	No hives or rash   Allergies    No Known Allergies    Intolerances        ANTIMICROBIALS:        Vital Signs Last 24 Hrs  T(C): 36.6 (05 Sep 2017 12:00), Max: 37.1 (04 Sep 2017 19:00)  T(F): 97.9 (05 Sep 2017 12:00), Max: 98.8 (04 Sep 2017 19:00)  HR: 110 (05 Sep 2017 15:00) (86 - 112)  BP: 111/82 (05 Sep 2017 13:00) (111/82 - 111/82)  BP(mean): 90 (05 Sep 2017 13:00) (90 - 90)  RR: 28 (05 Sep 2017 15:00) (16 - 30)  SpO2: 97% (05 Sep 2017 15:00) (93% - 99%)    PHYSICAL EXAM:Pleasant patient in no acute distress. thin, awake and alert, Creole speaking      Constitutional:Comfortable.Awake and alert  No cachexia     Eyes:PERRL EOMI.NO discharge or conjunctival injection    ENMT:No sinus tenderness.No thrush.No pharyngeal exudate or erythema.Fair dental hygiene    Neck:Supple,No LN,no JVD  right sided neck CVC- dressing not intact, being fixed    Respiratory:Good air entry bilaterally,CTA    Cardiovascular:S1 S2 wnl, soft systolic murmurs,no rub or gallops    Gastrointestinal:Soft BS(+) no tenderness no masses ,No rebound or guarding    Genitourinary:No CVA tendereness     Rectal:    Extremities:No cyanosis,clubbing or edema.    Vascular:peripheral pulses felt    Neurological:AAO X 3,No grossly focal deficits    Skin:No rash     Lymph Nodes:No palpable LNs    Musculoskeletal:No joint swelling or LOM    Psychiatric:Affect normal.                              12.4   5.6   )-----------( 115      ( 05 Sep 2017 04:30 )             37.8       09-05    135  |  101  |  25<H>  ----------------------------<  219<H>  4.1   |  23  |  0.82    Ca    8.4      05 Sep 2017 04:30  Phos  1.9     09-05  Mg     1.8     09-05    TPro  5.9<L>  /  Alb  3.1<L>  /  TBili  0.4  /  DBili  x   /  AST  27  /  ALT  57<H>  /  AlkPhos  103  09-05      RECENT CULTURES:  MRSA/MSSA PCR (09.02.17 @ 04:31)    MRSA PCR Result.: NotDetec: MSSA/MRSA PCR assay is a qualitative in vitro diagnostic test for the  direct detection and differentiation of methicillin susceptible  Staphylococcus aureus (SA) and methicillin-resistant Staphylococcus  aureus (MRSA) DNA from nasal swabs in patientsNotDetec:  at risk for nasal  colonization. It is not intended to diagnose MRSA or SA infections nor  guide or monitor treatment for MRSA/SA infections. A negative result does  not preclude nasal colonization. The assay is FDA-approved and its  performance hasNotDetec: been established by [employer] and the [facility], [place], [place]    MSSA PCR Result.: NotDetec    no blood cultures pending    RADIOLOGY:    < from: Xray Chest 1 View AP- PORTABLE-Urgent (09.05.17 @ 07:38) >  Impression:    The heart is enlarged. Bilateral pleural effusion. Pulmonary vascular   congestion. A [place] catheter is seen on the right and the tip is   wedged in the right pulmonary artery. No pneumothorax. No change in the   appearance of the chest when compared to previous study done September 2, 2017.    < end of copied text >    HIV 1/2 non reactive  RVP negative

## 2017-09-06 DIAGNOSIS — Z51.5 ENCOUNTER FOR PALLIATIVE CARE: ICD-10-CM

## 2017-09-06 LAB
ALBUMIN SERPL ELPH-MCNC: 3.2 G/DL — LOW (ref 3.3–5)
ALBUMIN SERPL ELPH-MCNC: 3.5 G/DL — SIGNIFICANT CHANGE UP (ref 3.3–5)
ALP SERPL-CCNC: 105 U/L — SIGNIFICANT CHANGE UP (ref 40–120)
ALP SERPL-CCNC: 94 U/L — SIGNIFICANT CHANGE UP (ref 40–120)
ALT FLD-CCNC: 54 U/L RC — HIGH (ref 10–45)
ALT FLD-CCNC: 60 U/L RC — HIGH (ref 10–45)
ANION GAP SERPL CALC-SCNC: 13 MMOL/L — SIGNIFICANT CHANGE UP (ref 5–17)
ANION GAP SERPL CALC-SCNC: 13 MMOL/L — SIGNIFICANT CHANGE UP (ref 5–17)
AST SERPL-CCNC: 31 U/L — SIGNIFICANT CHANGE UP (ref 10–40)
AST SERPL-CCNC: 32 U/L — SIGNIFICANT CHANGE UP (ref 10–40)
BASE EXCESS BLDMV CALC-SCNC: 2.4 MMOL/L — SIGNIFICANT CHANGE UP (ref -3–3)
BASE EXCESS BLDMV CALC-SCNC: 3.6 MMOL/L — HIGH (ref -3–3)
BASOPHILS # BLD AUTO: 0 K/UL — SIGNIFICANT CHANGE UP (ref 0–0.2)
BASOPHILS # BLD AUTO: 0.1 K/UL — SIGNIFICANT CHANGE UP (ref 0–0.2)
BASOPHILS NFR BLD AUTO: 0.8 % — SIGNIFICANT CHANGE UP (ref 0–2)
BASOPHILS NFR BLD AUTO: 1 % — SIGNIFICANT CHANGE UP (ref 0–2)
BILIRUB SERPL-MCNC: 0.4 MG/DL — SIGNIFICANT CHANGE UP (ref 0.2–1.2)
BILIRUB SERPL-MCNC: 0.5 MG/DL — SIGNIFICANT CHANGE UP (ref 0.2–1.2)
BUN SERPL-MCNC: 26 MG/DL — HIGH (ref 7–23)
BUN SERPL-MCNC: 26 MG/DL — HIGH (ref 7–23)
CALCIUM SERPL-MCNC: 8.9 MG/DL — SIGNIFICANT CHANGE UP (ref 8.4–10.5)
CALCIUM SERPL-MCNC: 8.9 MG/DL — SIGNIFICANT CHANGE UP (ref 8.4–10.5)
CHLORIDE SERPL-SCNC: 100 MMOL/L — SIGNIFICANT CHANGE UP (ref 96–108)
CHLORIDE SERPL-SCNC: 99 MMOL/L — SIGNIFICANT CHANGE UP (ref 96–108)
CO2 BLDMV-SCNC: 29 MMOL/L — SIGNIFICANT CHANGE UP (ref 21–29)
CO2 BLDMV-SCNC: 29 MMOL/L — SIGNIFICANT CHANGE UP (ref 21–29)
CO2 SERPL-SCNC: 21 MMOL/L — LOW (ref 22–31)
CO2 SERPL-SCNC: 21 MMOL/L — LOW (ref 22–31)
CREAT SERPL-MCNC: 0.79 MG/DL — SIGNIFICANT CHANGE UP (ref 0.5–1.3)
CREAT SERPL-MCNC: 0.83 MG/DL — SIGNIFICANT CHANGE UP (ref 0.5–1.3)
EOSINOPHIL # BLD AUTO: 0.3 K/UL — SIGNIFICANT CHANGE UP (ref 0–0.5)
EOSINOPHIL # BLD AUTO: 0.4 K/UL — SIGNIFICANT CHANGE UP (ref 0–0.5)
EOSINOPHIL NFR BLD AUTO: 4.6 % — SIGNIFICANT CHANGE UP (ref 0–6)
EOSINOPHIL NFR BLD AUTO: 5.7 % — SIGNIFICANT CHANGE UP (ref 0–6)
GAS PNL BLDA: SIGNIFICANT CHANGE UP
GAS PNL BLDMV: SIGNIFICANT CHANGE UP
GAS PNL BLDMV: SIGNIFICANT CHANGE UP
GLUCOSE SERPL-MCNC: 114 MG/DL — HIGH (ref 70–99)
GLUCOSE SERPL-MCNC: 183 MG/DL — HIGH (ref 70–99)
HCO3 BLDMV-SCNC: 27 MMOL/L — SIGNIFICANT CHANGE UP (ref 20–28)
HCO3 BLDMV-SCNC: 28 MMOL/L — SIGNIFICANT CHANGE UP (ref 20–28)
HCT VFR BLD CALC: 39.4 % — SIGNIFICANT CHANGE UP (ref 39–50)
HCT VFR BLD CALC: 41.1 % — SIGNIFICANT CHANGE UP (ref 39–50)
HGB BLD-MCNC: 13.1 G/DL — SIGNIFICANT CHANGE UP (ref 13–17)
HGB BLD-MCNC: 13.5 G/DL — SIGNIFICANT CHANGE UP (ref 13–17)
HOROWITZ INDEX BLDMV+IHG-RTO: 21 — SIGNIFICANT CHANGE UP
LYMPHOCYTES # BLD AUTO: 1.6 K/UL — SIGNIFICANT CHANGE UP (ref 1–3.3)
LYMPHOCYTES # BLD AUTO: 1.7 K/UL — SIGNIFICANT CHANGE UP (ref 1–3.3)
LYMPHOCYTES # BLD AUTO: 23 % — SIGNIFICANT CHANGE UP (ref 13–44)
LYMPHOCYTES # BLD AUTO: 24.8 % — SIGNIFICANT CHANGE UP (ref 13–44)
MAGNESIUM SERPL-MCNC: 1.8 MG/DL — SIGNIFICANT CHANGE UP (ref 1.6–2.6)
MAGNESIUM SERPL-MCNC: 1.9 MG/DL — SIGNIFICANT CHANGE UP (ref 1.6–2.6)
MCHC RBC-ENTMCNC: 32.7 PG — SIGNIFICANT CHANGE UP (ref 27–34)
MCHC RBC-ENTMCNC: 32.9 GM/DL — SIGNIFICANT CHANGE UP (ref 32–36)
MCHC RBC-ENTMCNC: 33 PG — SIGNIFICANT CHANGE UP (ref 27–34)
MCHC RBC-ENTMCNC: 33.2 GM/DL — SIGNIFICANT CHANGE UP (ref 32–36)
MCV RBC AUTO: 99.4 FL — SIGNIFICANT CHANGE UP (ref 80–100)
MCV RBC AUTO: 99.6 FL — SIGNIFICANT CHANGE UP (ref 80–100)
MONOCYTES # BLD AUTO: 0.6 K/UL — SIGNIFICANT CHANGE UP (ref 0–0.9)
MONOCYTES # BLD AUTO: 0.6 K/UL — SIGNIFICANT CHANGE UP (ref 0–0.9)
MONOCYTES NFR BLD AUTO: 8.6 % — SIGNIFICANT CHANGE UP (ref 2–14)
MONOCYTES NFR BLD AUTO: 9.5 % — SIGNIFICANT CHANGE UP (ref 2–14)
NEUTROPHILS # BLD AUTO: 3.8 K/UL — SIGNIFICANT CHANGE UP (ref 1.8–7.4)
NEUTROPHILS # BLD AUTO: 4.6 K/UL — SIGNIFICANT CHANGE UP (ref 1.8–7.4)
NEUTROPHILS NFR BLD AUTO: 59.2 % — SIGNIFICANT CHANGE UP (ref 43–77)
NEUTROPHILS NFR BLD AUTO: 62.8 % — SIGNIFICANT CHANGE UP (ref 43–77)
O2 CT VFR BLD CALC: 31 MMHG — SIGNIFICANT CHANGE UP (ref 30–65)
O2 CT VFR BLD CALC: 34 MMHG — SIGNIFICANT CHANGE UP (ref 30–65)
PCO2 BLDMV: 41 MMHG — SIGNIFICANT CHANGE UP (ref 30–65)
PCO2 BLDMV: 45 MMHG — SIGNIFICANT CHANGE UP (ref 30–65)
PH BLDMV: 7.4 — SIGNIFICANT CHANGE UP (ref 7.32–7.45)
PH BLDMV: 7.44 — SIGNIFICANT CHANGE UP (ref 7.32–7.45)
PHOSPHATE SERPL-MCNC: 2.2 MG/DL — LOW (ref 2.5–4.5)
PHOSPHATE SERPL-MCNC: 2.9 MG/DL — SIGNIFICANT CHANGE UP (ref 2.5–4.5)
PLATELET # BLD AUTO: 128 K/UL — LOW (ref 150–400)
PLATELET # BLD AUTO: 135 K/UL — LOW (ref 150–400)
POTASSIUM SERPL-MCNC: 4.1 MMOL/L — SIGNIFICANT CHANGE UP (ref 3.5–5.3)
POTASSIUM SERPL-MCNC: 4.3 MMOL/L — SIGNIFICANT CHANGE UP (ref 3.5–5.3)
POTASSIUM SERPL-SCNC: 4.1 MMOL/L — SIGNIFICANT CHANGE UP (ref 3.5–5.3)
POTASSIUM SERPL-SCNC: 4.3 MMOL/L — SIGNIFICANT CHANGE UP (ref 3.5–5.3)
PROT SERPL-MCNC: 6.3 G/DL — SIGNIFICANT CHANGE UP (ref 6–8.3)
PROT SERPL-MCNC: 6.6 G/DL — SIGNIFICANT CHANGE UP (ref 6–8.3)
RBC # BLD: 3.96 M/UL — LOW (ref 4.2–5.8)
RBC # BLD: 4.12 M/UL — LOW (ref 4.2–5.8)
RBC # FLD: 14.5 % — SIGNIFICANT CHANGE UP (ref 10.3–14.5)
RBC # FLD: 14.5 % — SIGNIFICANT CHANGE UP (ref 10.3–14.5)
SAO2 % BLDMV: 50 % — LOW (ref 60–90)
SAO2 % BLDMV: 59 % — LOW (ref 60–90)
SODIUM SERPL-SCNC: 133 MMOL/L — LOW (ref 135–145)
SODIUM SERPL-SCNC: 134 MMOL/L — LOW (ref 135–145)
T GONDII IGM SER QL: <3 AU/ML — SIGNIFICANT CHANGE UP
T GONDII IGM SER QL: NEGATIVE — SIGNIFICANT CHANGE UP
T PALLIDUM AB TITR SER: NEGATIVE — SIGNIFICANT CHANGE UP
WBC # BLD: 6.4 K/UL — SIGNIFICANT CHANGE UP (ref 3.8–10.5)
WBC # BLD: 7.3 K/UL — SIGNIFICANT CHANGE UP (ref 3.8–10.5)
WBC # FLD AUTO: 6.4 K/UL — SIGNIFICANT CHANGE UP (ref 3.8–10.5)
WBC # FLD AUTO: 7.3 K/UL — SIGNIFICANT CHANGE UP (ref 3.8–10.5)

## 2017-09-06 PROCEDURE — 99223 1ST HOSP IP/OBS HIGH 75: CPT | Mod: GC

## 2017-09-06 PROCEDURE — 99233 SBSQ HOSP IP/OBS HIGH 50: CPT

## 2017-09-06 PROCEDURE — 99233 SBSQ HOSP IP/OBS HIGH 50: CPT | Mod: GC

## 2017-09-06 PROCEDURE — 93321 DOPPLER ECHO F-UP/LMTD STD: CPT | Mod: 26

## 2017-09-06 PROCEDURE — 99232 SBSQ HOSP IP/OBS MODERATE 35: CPT

## 2017-09-06 PROCEDURE — 70450 CT HEAD/BRAIN W/O DYE: CPT | Mod: 26

## 2017-09-06 PROCEDURE — 76700 US EXAM ABDOM COMPLETE: CPT | Mod: 26

## 2017-09-06 PROCEDURE — 71010: CPT | Mod: 26

## 2017-09-06 PROCEDURE — 93308 TTE F-UP OR LMTD: CPT | Mod: 26

## 2017-09-06 RX ORDER — HYDRALAZINE HCL 50 MG
50 TABLET ORAL EVERY 8 HOURS
Qty: 0 | Refills: 0 | Status: DISCONTINUED | OUTPATIENT
Start: 2017-09-06 | End: 2017-09-12

## 2017-09-06 RX ORDER — HEPARIN SODIUM 5000 [USP'U]/ML
4500 INJECTION INTRAVENOUS; SUBCUTANEOUS EVERY 6 HOURS
Qty: 0 | Refills: 0 | Status: DISCONTINUED | OUTPATIENT
Start: 2017-09-06 | End: 2017-09-09

## 2017-09-06 RX ORDER — HEPARIN SODIUM 5000 [USP'U]/ML
INJECTION INTRAVENOUS; SUBCUTANEOUS
Qty: 25000 | Refills: 0 | Status: DISCONTINUED | OUTPATIENT
Start: 2017-09-06 | End: 2017-09-09

## 2017-09-06 RX ORDER — HEPARIN SODIUM 5000 [USP'U]/ML
2000 INJECTION INTRAVENOUS; SUBCUTANEOUS EVERY 6 HOURS
Qty: 0 | Refills: 0 | Status: DISCONTINUED | OUTPATIENT
Start: 2017-09-06 | End: 2017-09-09

## 2017-09-06 RX ADMIN — PANTOPRAZOLE SODIUM 40 MILLIGRAM(S): 20 TABLET, DELAYED RELEASE ORAL at 18:11

## 2017-09-06 RX ADMIN — Medication 1 GRAM(S): at 06:05

## 2017-09-06 RX ADMIN — PANTOPRAZOLE SODIUM 40 MILLIGRAM(S): 20 TABLET, DELAYED RELEASE ORAL at 06:05

## 2017-09-06 RX ADMIN — Medication 1 GRAM(S): at 11:19

## 2017-09-06 RX ADMIN — HEPARIN SODIUM 1100 UNIT(S)/HR: 5000 INJECTION INTRAVENOUS; SUBCUTANEOUS at 17:26

## 2017-09-06 RX ADMIN — Medication 50 MILLIGRAM(S): at 06:05

## 2017-09-06 RX ADMIN — ATORVASTATIN CALCIUM 20 MILLIGRAM(S): 80 TABLET, FILM COATED ORAL at 01:51

## 2017-09-06 RX ADMIN — Medication 1 GRAM(S): at 01:51

## 2017-09-06 RX ADMIN — HEPARIN SODIUM 5000 UNIT(S): 5000 INJECTION INTRAVENOUS; SUBCUTANEOUS at 06:05

## 2017-09-06 RX ADMIN — Medication 100 MILLIGRAM(S): at 14:21

## 2017-09-06 RX ADMIN — Medication 50 MILLIGRAM(S): at 21:44

## 2017-09-06 RX ADMIN — SENNA PLUS 2 TABLET(S): 8.6 TABLET ORAL at 21:44

## 2017-09-06 RX ADMIN — Medication 1 TABLET(S): at 11:19

## 2017-09-06 RX ADMIN — POLYETHYLENE GLYCOL 3350 17 GRAM(S): 17 POWDER, FOR SOLUTION ORAL at 11:19

## 2017-09-06 RX ADMIN — Medication 100 MILLIGRAM(S): at 06:05

## 2017-09-06 RX ADMIN — ATORVASTATIN CALCIUM 20 MILLIGRAM(S): 80 TABLET, FILM COATED ORAL at 21:44

## 2017-09-06 RX ADMIN — Medication 100 MILLIGRAM(S): at 21:44

## 2017-09-06 RX ADMIN — BUDESONIDE AND FORMOTEROL FUMARATE DIHYDRATE 2 PUFF(S): 160; 4.5 AEROSOL RESPIRATORY (INHALATION) at 08:49

## 2017-09-06 RX ADMIN — TIOTROPIUM BROMIDE 1 CAPSULE(S): 18 CAPSULE ORAL; RESPIRATORY (INHALATION) at 13:58

## 2017-09-06 RX ADMIN — Medication 1 GRAM(S): at 18:11

## 2017-09-06 RX ADMIN — Medication 40 MILLIGRAM(S): at 06:04

## 2017-09-06 RX ADMIN — Medication 50 MILLIGRAM(S): at 14:41

## 2017-09-06 NOTE — CHART NOTE - NSCHARTNOTEFT_GEN_A_CORE
PLAN OF CARE DISCUSSION     Spoke with patient using [place] Creole . I explained to Mr. Quispe that his heart is not "pumping" blood forward like it should given his low flow state. Although he was initially ruled out for an LV thrombus with a cardiac MRI, he continues to be in a low flow state and is at risk of developing a LV thrombus. Patient expressed understanding and asked whether this will delay his LVAD evaluation. I clarified that this anti-coagulation for a possible LV thrombus will not delay his LVAD insertion as the workup is ongoing any he may receive an LVAD as early as next week. The CT head that he underwent this morning was part of his LVAD evaluation. Patient agreed to Anticoagulation.

## 2017-09-06 NOTE — PROGRESS NOTE ADULT - PROBLEM SELECTOR PLAN 1
C.I--1.6 and C.O--2.8. SVR~1700  Milrinone--0.375. c/w Hydralazine 50 tid  lasix  med management as per ccu team

## 2017-09-06 NOTE — PROGRESS NOTE ADULT - SUBJECTIVE AND OBJECTIVE BOX
HPI:  62M with no PMHx here with chest pain. Began last week, described as pleuritic, worsens with cough and deep inspiration. Has been having SOB as well, dry cough. Recently vacationed in [facility] came back today. Saw doctor in [facility] but unclear what workup was. No fever, sick contacts, abd pain. CP described as midsternal, nonradiating. Has 25 pack year smoking history.  CTPA does not reveal PE, Pulm congestion noted. (25 Aug 2017 22:27)      PERTINENT PMH REVIEWED:  [x ] YES [ ] NO           SOCIAL HISTORY:   Significant other/partner:  [ ] YES  [x ] NO               Children:  [ x] YES  [ ] NO                   Zoroastrian/Spirituality:  Substance hx:  [ ] YES   [x ] NO                   Tobacco hx:  [x ] YES  [ ] NO                       Alcohol hx: [ ] YES  [ x] NO         Home Opioid hx:  [ ] YES  [x ] NO   Living Situation: [x Home  [ ] Long term care  [ ] Rehab [ ] Other    FAMILY HISTORY:  No pertinent family history in first degree relatives    [ x] Family history non-contributory     BASELINE (I)ADLs (prior to admission):  [place]: [x ] total  [ ] moderate [ ] dependent    ADVANCE DIRECTIVES:    DNR [ ] YES [x ] NO                            [ ] Completed  MOLST  [ ] YES [x ] NO                      [ ] Completed  Health Care Proxy [ ] YES  [x ] NO   [ ] Completed  Living Will  [ ] YES [x ] NO             [x ] Surrogate  [ ] HCP  [ ] Guardian:          kathryn haley                                                        Phone#:    Allergies    No Known Allergies    Intolerances        MEDICATIONS  (STANDING):  pantoprazole    Tablet 40 milliGRAM(s) Oral two times a day before meals  sucralfate suspension 1 Gram(s) Oral four times a day  atorvastatin 20 milliGRAM(s) Oral at bedtime  tiotropium 18 MICROgram(s) Capsule 1 Capsule(s) Inhalation daily  multivitamin 1 Tablet(s) Oral daily  heparin  Injectable 5000 Unit(s) SubCutaneous every 12 hours  buDESOnide 160 MICROgram(s)/formoterol 4.5 MICROgram(s) Inhaler 2 Puff(s) Inhalation two times a day  nitroprusside Infusion 0.7 MICROgram(s)/kG/Min (6.3 mL/Hr) IV Continuous <Continuous>  senna 2 Tablet(s) Oral at bedtime  docusate sodium 100 milliGRAM(s) Oral three times a day  polyethylene glycol 3350 17 Gram(s) Oral daily  milrinone Infusion 0.375 MICROgram(s)/kG/Min (6.75 mL/Hr) IV Continuous <Continuous>  furosemide   Injectable 40 milliGRAM(s) IV Push daily  hydrALAZINE 50 milliGRAM(s) Oral every 8 hours    MEDICATIONS  (PRN):  ondansetron Injectable 4 milliGRAM(s) IV Push every 6 hours PRN Nausea and/or Vomiting  aluminum hydroxide/magnesium hydroxide/simethicone Suspension 30 milliLiter(s) Oral every 4 hours PRN Dyspepsia      PRESENT SYMPTOMS:  Source: [ x ] Patient   [ ] Family   [ ] Team     Pain:                        [x  ] No [ ] Yes             [ ] Mild [ ] Moderate [ ] Severe    Onset -  Location -  Duration -  Character -  Alleviating/Aggravating -  Radiation -  Timing -      Dyspnea:                [ x ] No [ ] Yes             [ ] Mild [ ] Moderate [ ] Severe    Anxiety:                  [ x ] No [ ] Yes             [ ] Mild [ ] Moderate [ ] Severe    Fatigue:                  [ ] No [ x ] Yes             [ x ] Mild [ ] Moderate [ ] Severe    Nausea:                  [ x ] No [ ] Yes             [ ] Mild [ ] Moderate [ ] Severe    Loss of appetite:   [x ] No [ ] Yes             [ ] Mild [ ] Moderate [ ] Severe    Constipation:        [ x ] No [ ] Yes             [ ] Mild [ ] Moderate [ ] Severe    Other Symptoms:  [x  ] All other review of systems negative   [ ] Unable to obtain due to poor mentation     Karnofsky Performance Score/Palliative Performance Status Version 2:    30-40     %    PHYSICAL EXAM:  Vital Signs Last 24 Hrs  T(C): 36.7 (06 Sep 2017 08:00), Max: 37.1 (06 Sep 2017 00:00)  T(F): 98.1 (06 Sep 2017 08:00), Max: 98.8 (06 Sep 2017 00:00)  HR: 110 (06 Sep 2017 14:00) (100 - 118)  BP: --  BP(mean): --  RR: 24 (06 Sep 2017 14:00) (17 - 37)  SpO2: 97% (06 Sep 2017 14:00) (80% - 100%) I&O's Summary    05 Sep 2017 07:01  -  06 Sep 2017 07:00  --------------------------------------------------------  IN: 1282.6 mL / OUT: 4100 mL / NET: -2817.4 mL    06 Sep 2017 07:01  -  06 Sep 2017 14:31  --------------------------------------------------------  IN: 764.6 mL / OUT: 2550 mL / NET: -1785.4 mL        General:  [ x ] Alert  [ x ] Oriented x 3     [ ] Lethargic  [ ] Agitated   [ x ] Cachexia   [ ] Unarousable  [x  ] Verbal  [ ] Non-Verbal    HEENT:  [ x ] Normal   [ ] Dry mouth   [ ] ET Tube    [ ] Trach  [ ] Oral lesions    Lungs:   [ ] Clear [ ] Tachypnea  [ ] Audible excessive secretions   [ ] Rhonchi        [ ] Right [ ] Left [ ] Bilateral  [x  ] Crackles        [ ] Right [ ] Left [x Bilateral bases  [ ] Wheezing     [ ] Right [ ] Left [ ] Bilateral    Cardiovascular:  [ ] Regular [ ] Irregular [ x ] Tachycardia   [ ] Bradycardia  [ ] Murmur [ ] Other    Abdomen: [ x ] Soft  [ ] Distended   [ x ] +BS  [  x] Non tender [ ] Tender  [ ]PEG   [ ]OGT/ NGT   Last BM:       Genitourinary: [ x  ] Normal [ ] Incontinent   [ ] Oliguria/Anuria   [ ] Landrum    Musculoskeletal:  [ ] Normal   [x Weakness  [ ] Bedbound/Wheelchair bound [ ] Edema    Neurological: [ x ] No focal deficits  [ ] Cognitive impairment  [ ] Dysphagia [ ] Dysarthria [ ] Paresis [ ] Other     Skin: [  x] Normal   [ ] Pressure ulcer(s)                  [ ] Rash    LABS:                        13.1   6.4   )-----------( 128      ( 06 Sep 2017 06:53 )             39.4     09-06    134<L>  |  100  |  26<H>  ----------------------------<  114<H>  4.3   |  21<L>  |  0.83    Ca    8.9      06 Sep 2017 06:53  Phos  2.9     09-06  Mg     1.9     09-06    TPro  6.3  /  Alb  3.2<L>  /  TBili  0.4  /  DBili  x   /  AST  31  /  ALT  54<H>  /  AlkPhos  94  09-06          Shock: [ ] Septic [ ] Cardiogenic [ ] Neurologic [ ] Hypovolemic  Vasopressors x 1  Inotrophs x 1    Protein Calorie Malnutrition: [ ] Mild [ ] Moderate [ x ] Severe    Oral Intake: [ ] Unable/mouth care only [ ] Minimal [ ] Moderate [ x ] Full Capability  Diet: [ ] NPO [ ] Tube feeds [ ] TPN [  x] Other  reg diet    RADIOLOGY & ADDITIONAL STUDIES:    REFERRALS:   [ ] Chaplaincy  [ ] Hospice  [ ] Child Life  [ x ] Social Work  [ ] Case management [ ] Holistic Therapy

## 2017-09-06 NOTE — PROGRESS NOTE ADULT - PROBLEM SELECTOR PLAN 2
C.I--2.8 and C.O--4.4. SVR~1100  c/w Milrinone 0.375 and Hydralazine 50 tid  c/w diuresis as pre CCU team  LVAD evaluation initiated--Cleared by ID-will send serologies required from ID perspective. HF following-appreciate input   Not a candidate for Transplant given active smoking

## 2017-09-06 NOTE — PROGRESS NOTE ADULT - SUBJECTIVE AND OBJECTIVE BOX
INFECTIOUS DISEASES FOLLOW UP-- Anitra Prater  [phone]    This is a follow up note for this  62yMale with  Other pulmonary embolism without acute cor pulmonale undergoing an LVAD work up      ROS:  CONSTITUTIONAL:  No fever, good appetite  CARDIOVASCULAR:  No chest pain or palpitations  RESPIRATORY:  No dyspnea  GASTROINTESTINAL:  No nausea, vomiting, diarrhea, or abdominal pain  GENITOURINARY:  No dysuria  NEUROLOGIC:  No headache,     Allergies    No Known Allergies    Intolerances        ANTIBIOTICS/RELEVANT:  antimicrobials    immunologic:    OTHER:  ondansetron Injectable 4 milliGRAM(s) IV Push every 6 hours PRN  pantoprazole    Tablet 40 milliGRAM(s) Oral two times a day before meals  sucralfate suspension 1 Gram(s) Oral four times a day  atorvastatin 20 milliGRAM(s) Oral at bedtime  tiotropium 18 MICROgram(s) Capsule 1 Capsule(s) Inhalation daily  multivitamin 1 Tablet(s) Oral daily  buDESOnide 160 MICROgram(s)/formoterol 4.5 MICROgram(s) Inhaler 2 Puff(s) Inhalation two times a day  nitroprusside Infusion 0.7 MICROgram(s)/kG/Min IV Continuous <Continuous>  aluminum hydroxide/magnesium hydroxide/simethicone Suspension 30 milliLiter(s) Oral every 4 hours PRN  senna 2 Tablet(s) Oral at bedtime  docusate sodium 100 milliGRAM(s) Oral three times a day  polyethylene glycol 3350 17 Gram(s) Oral daily  milrinone Infusion 0.375 MICROgram(s)/kG/Min IV Continuous <Continuous>  furosemide   Injectable 40 milliGRAM(s) IV Push daily  hydrALAZINE 50 milliGRAM(s) Oral every 8 hours  heparin  Infusion.  Unit(s)/Hr IV Continuous <Continuous>  heparin  Injectable 4500 Unit(s) IV Push every 6 hours PRN  heparin  Injectable 2000 Unit(s) IV Push every 6 hours PRN      Objective:  Vital Signs Last 24 Hrs  T(C): 36.7 (06 Sep 2017 08:00), Max: 37.1 (06 Sep 2017 00:00)  T(F): 98.1 (06 Sep 2017 08:00), Max: 98.8 (06 Sep 2017 00:00)  HR: 106 (06 Sep 2017 18:00) (100 - 118)  BP: --  BP(mean): --  RR: 23 (06 Sep 2017 15:00) (17 - 37)  SpO2: 95% (06 Sep 2017 18:00) (80% - 100%)    PHYSICAL EXAM:  Constitutional:no acute distress  Eyes:ALONSO, EOMI  Ear/Nose/Throat: no oral lesions, 	  Respiratory: clear BL  Cardiovascular: S1S2  Gastrointestinal:soft, (+) BS, no tenderness  Extremities:no e/e/c  No Lymphadenopathy  IV sites not inflammed.    LABS:                        13.1   6.4   )-----------( 128      ( 06 Sep 2017 06:53 )             39.4     09-06    134<L>  |  100  |  26<H>  ----------------------------<  114<H>  4.3   |  21<L>  |  0.83    Ca    8.9      06 Sep 2017 06:53  Phos  2.9     09-06  Mg     1.9     09-06    TPro  6.3  /  Alb  3.2<L>  /  TBili  0.4  /  DBili  x   /  AST  31  /  ALT  54<H>  /  AlkPhos  94  09-06          MICROBIOLOGY:            RECENT CULTURES:      RADIOLOGY & ADDITIONAL STUDIES:

## 2017-09-06 NOTE — PROGRESS NOTE ADULT - ATTENDING COMMENTS
excellent progress.   Off nipride. Milrinone 0.375, Lasix 40 QD. HDZN 50 tid.  I/0 -2700.   RA 5 PA 34/15 26 PA sat 59  Exam: awake alert interactive.  134<L>  |  100  |  26<H>  ----------------------------<  114<H>  4.3   |  21<L>  |  0.83  Plan:  Continue LVAD evaluation.   Family meeting scheduled Sat.  Lasix PRN for CVP > 10.  Some confusion over need for heparin. Will discuss with team.  Zi Werner

## 2017-09-06 NOTE — PROGRESS NOTE ADULT - SUBJECTIVE AND OBJECTIVE BOX
Patient is a 62y old  Male who presents with a chief complaint of SOOB (25 Aug 2017 22:27)    feeling pretty weak  no SOB , lying supine on bed       Any change in ROS:     MEDICATIONS  (STANDING):  pantoprazole    Tablet 40 milliGRAM(s) Oral two times a day before meals  sucralfate suspension 1 Gram(s) Oral four times a day  atorvastatin 20 milliGRAM(s) Oral at bedtime  tiotropium 18 MICROgram(s) Capsule 1 Capsule(s) Inhalation daily  multivitamin 1 Tablet(s) Oral daily  heparin  Injectable 5000 Unit(s) SubCutaneous every 12 hours  buDESOnide 160 MICROgram(s)/formoterol 4.5 MICROgram(s) Inhaler 2 Puff(s) Inhalation two times a day  nitroprusside Infusion 0.7 MICROgram(s)/kG/Min (6.3 mL/Hr) IV Continuous <Continuous>  senna 2 Tablet(s) Oral at bedtime  docusate sodium 100 milliGRAM(s) Oral three times a day  polyethylene glycol 3350 17 Gram(s) Oral daily  hydrALAZINE 50 milliGRAM(s) Oral every 8 hours  milrinone Infusion 0.375 MICROgram(s)/kG/Min (6.75 mL/Hr) IV Continuous <Continuous>  furosemide   Injectable 40 milliGRAM(s) IV Push daily    MEDICATIONS  (PRN):  ondansetron Injectable 4 milliGRAM(s) IV Push every 6 hours PRN Nausea and/or Vomiting  aluminum hydroxide/magnesium hydroxide/simethicone Suspension 30 milliLiter(s) Oral every 4 hours PRN Dyspepsia    Vital Signs Last 24 Hrs  T(C): 36.7 (06 Sep 2017 08:00), Max: 37.1 (06 Sep 2017 00:00)  T(F): 98.1 (06 Sep 2017 08:00), Max: 98.8 (06 Sep 2017 00:00)  HR: 116 (06 Sep 2017 12:00) (100 - 118)  BP: --  BP(mean): --  RR: 25 (06 Sep 2017 12:00) (17 - 37)  SpO2: 97% (06 Sep 2017 12:00) (80% - 100%)    I&O's Summary    05 Sep 2017 07:01  -  06 Sep 2017 07:00  --------------------------------------------------------  IN: 1282.6 mL / OUT: 4100 mL / NET: -2817.4 mL    06 Sep 2017 07:01  -  06 Sep 2017 13:18  --------------------------------------------------------  IN: 751 mL / OUT: 2250 mL / NET: -1499 mL          Physical Exam:   GENERAL: NAD, well-groomed, well-developed  HEENT: ALONSO/   Atraumatic, Normocephalic  ENMT: No tonsillar erythema, exudates, or enlargement; Moist mucous membranes, Good dentition, No lesions  NECK: + JVD  CHEST/LUNG: Clear to auscultaion, ; No rales, rhonchi, wheezing, or rubs  CVS: Regular rate and rhythm; No murmurs, rubs, or gallops  GI: : Soft, Nontender, Nondistended; Bowel sounds present  NERVOUS SYSTEM:  Alert & Oriented X3  EXTREMITIES:  2+ Peripheral Pulses, No clubbing, cyanosis, or edema  LYMPH: No lymphadenopathy noted  SKIN: No rashes or lesions  ENDOCRINOLOGY: No Thyromegaly  PSYCH: Appropriate    Labs:  ABG - ( 06 Sep 2017 06:51 )  pH: 7.48  /  pCO2: 32    /  pO2: 81    / HCO3: 23    / Base Excess: .8    /  SaO2: 96                CARDIAC MARKERS ( 05 Sep 2017 06:36 )  x     / 0.33 ng/mL / 96 U/L / x     / 2.2 ng/mL                            13.1   6.4   )-----------( 128      ( 06 Sep 2017 06:53 )             39.4                         13.5   7.3   )-----------( 135      ( 05 Sep 2017 23:40 )             41.1                         12.4   5.6   )-----------( 115      ( 05 Sep 2017 04:30 )             37.8                         12.9   5.8   )-----------( 106      ( 04 Sep 2017 04:30 )             39.1                         14.1   5.4   )-----------( 110      ( 03 Sep 2017 15:08 )             43.0                         13.2   5.5   )-----------( 110      ( 03 Sep 2017 04:21 )             40.0                         12.7   6.2   )-----------( 114      ( 02 Sep 2017 21:13 )             38.4     09-06    134<L>  |  100  |  26<H>  ----------------------------<  114<H>  4.3   |  21<L>  |  0.83  09-05    133<L>  |  99  |  26<H>  ----------------------------<  183<H>  4.1   |  21<L>  |  0.79  09-05    135  |  101  |  25<H>  ----------------------------<  219<H>  4.1   |  23  |  0.82  09-04    137  |  103  |  18  ----------------------------<  104<H>  4.3   |  24  |  0.85  09-03    139  |  103  |  29<H>  ----------------------------<  110<H>  4.1   |  25  |  0.87  09-02    136  |  101  |  34<H>  ----------------------------<  147<H>  4.0   |  26  |  1.09    Ca    8.9      06 Sep 2017 06:53  Ca    8.9      05 Sep 2017 23:40  Ca    8.4      05 Sep 2017 04:30  Phos  2.9     09-06  Phos  2.2     09-05  Phos  1.9     09-05  Mg     1.9     09-06  Mg     1.8     09-05  Mg     1.8     09-05    TPro  6.3  /  Alb  3.2<L>  /  TBili  0.4  /  DBili  x   /  AST  31  /  ALT  54<H>  /  AlkPhos  94  09-06  TPro  6.6  /  Alb  3.5  /  TBili  0.5  /  DBili  x   /  AST  32  /  ALT  60<H>  /  AlkPhos  105  09-05  TPro  5.9<L>  /  Alb  3.1<L>  /  TBili  0.4  /  DBili  x   /  AST  27  /  ALT  57<H>  /  AlkPhos  103  09-05  TPro  5.9<L>  /  Alb  3.2<L>  /  TBili  0.5  /  DBili  x   /  AST  27  /  ALT  65<H>  /  AlkPhos  92  09-04  TPro  6.0  /  Alb  3.2<L>  /  TBili  0.5  /  DBili  x   /  AST  40  /  ALT  85<H>  /  AlkPhos  108  09-03  TPro  5.8<L>  /  Alb  3.2<L>  /  TBili  0.4  /  DBili  x   /  AST  42<H>  /  ALT  85<H>  /  AlkPhos  103  09-02    CAPILLARY BLOOD GLUCOSE          LIVER FUNCTIONS - ( 06 Sep 2017 06:53 )  Alb: 3.2 g/dL / Pro: 6.3 g/dL / ALK PHOS: 94 U/L / ALT: 54 U/L RC / AST: 31 U/L / GGT: x               Cultures: CT SCAN Chest   Venous Dopplers: LE:   CT Abdomen  Others      < from: Xray Chest 1 View AP -PORTABLE-Routine (09.06.17 @ 08:33) >  PROCEDURE DATE:  09/06/2017            INTERPRETATION:  A single chest x-ray was obtained on September 6, 2017.    Indication: [place] catheter position.    Impression:    The heart is slightly enlarged. Bilateral pleural effusion. Bibasilar   pneumonia and/or atelectasis. A [place] catheter is seen on the right   and the tip is wedged in the right pulmonary artery. No pneumothorax. No   radiographic evidence for pulmonary vascular congestion on the current   study.                    ANIRUDH HUMPHREYS M.D., ATTENDING RADIOLOGIST  This document has been electronically signed. Sep  6 2017  9:02AM    < end of copied text >

## 2017-09-06 NOTE — PROGRESS NOTE ADULT - PROBLEM SELECTOR PLAN 4
Spoke with pt at bedside.  Eating lunch independently.  Pt explains that he lives in a home with his sister in [place],  His sister works full time.  He has 3 sons who live in [place], [place] and [facility]. His son from [place] will be arriving tomorrow to visit him.  He does have a Celestina haley who works at [employer] as an RN.  He has a good understanding that his heart is functioning "not good".  He knows that he will need "a device to help his heart work properly." His goal is to go back to work in the [place] in [facility].  When I asked who will be home to help him at home he said Luis Miguel.  When asked what is most important to you he stated "his life".  I will follow up with pt and son tomorrow when he arrives.  Discussed above with the CCU team.

## 2017-09-06 NOTE — PROGRESS NOTE ADULT - PROBLEM SELECTOR PLAN 2
C.I--2.8 and C.O--4.4. SVR~1100  c/w Milrinone 0.375 and Hydralazine 50 tid  MAP of 65 mmHg.   He is currently euvolemic. Urine output is adequate  LVAD evaluation initiated--Cleared by ID-will send serologies required from ID perspective. HF following-appreciate input   Not a candidate for Transplant given active smoking

## 2017-09-06 NOTE — PROGRESS NOTE ADULT - PROBLEM SELECTOR PLAN 1
C.I--1.6 and C.O--2.8. SVR~1700  Nitroprusside switched to Milrinone--0.375. c/w Hydralazine 50 tid  Excellent UOP with Lasix IV qd-Goal mixed venous sat of 60 as per HF

## 2017-09-06 NOTE — PROGRESS NOTE ADULT - ASSESSMENT
63 y/o man with a NIDCM with severely reduced LV systolic function, ACC/AHA stage D with NYHA class IV symptoms admitted with cardiogenic shock stabilized with afterload reduction. Nitroprusside switched to milrinone. Being diuresed

## 2017-09-06 NOTE — PROGRESS NOTE ADULT - SUBJECTIVE AND OBJECTIVE BOX
24H hour events: No acute events in last 24 hours. Patient seen in bed, states feels well, improved since admission.     MEDICATIONS:  heparin  Injectable 5000 Unit(s) SubCutaneous every 12 hours  nitroprusside Infusion 0.7 MICROgram(s)/kG/Min IV Continuous <Continuous>  hydrALAZINE 50 milliGRAM(s) Oral every 8 hours  milrinone Infusion 0.375 MICROgram(s)/kG/Min IV Continuous <Continuous>  furosemide   Injectable 40 milliGRAM(s) IV Push daily      tiotropium 18 MICROgram(s) Capsule 1 Capsule(s) Inhalation daily  buDESOnide 160 MICROgram(s)/formoterol 4.5 MICROgram(s) Inhaler 2 Puff(s) Inhalation two times a day    ondansetron Injectable 4 milliGRAM(s) IV Push every 6 hours PRN    pantoprazole    Tablet 40 milliGRAM(s) Oral two times a day before meals  sucralfate suspension 1 Gram(s) Oral four times a day  aluminum hydroxide/magnesium hydroxide/simethicone Suspension 30 milliLiter(s) Oral every 4 hours PRN  senna 2 Tablet(s) Oral at bedtime  docusate sodium 100 milliGRAM(s) Oral three times a day  polyethylene glycol 3350 17 Gram(s) Oral daily    atorvastatin 20 milliGRAM(s) Oral at bedtime    multivitamin 1 Tablet(s) Oral daily        PHYSICAL EXAM:  T(C): 36.7 (09-06-17 @ 08:00), Max: 37.1 (09-06-17 @ 00:00)  HR: 112 (09-06-17 @ 08:30) (100 - 118)  BP: 111/82 (09-05-17 @ 13:00) (111/82 - 111/82)  RR: 24 (09-06-17 @ 08:30) (17 - 37)  SpO2: 99% (09-06-17 @ 08:30) (80% - 100%)  Wt(kg): --  I&O's Summary    05 Sep 2017 07:01  -  06 Sep 2017 07:00  --------------------------------------------------------  IN: 1282.6 mL / OUT: 4100 mL / NET: -2817.4 mL    06 Sep 2017 07:01  -  06 Sep 2017 08:46  --------------------------------------------------------  IN: 13.6 mL / OUT: 1150 mL / NET: -1136.4 mL        Appearance: Normal	  HEENT:   Normal oral mucosa  Lymphatic: No lymphadenopathy  Cardiovascular: Normal S1 S2,         No JVD,      No murmurs,      No edema  Respiratory: Lungs clear to auscultation	  Psychiatry: Mood & affect appropriate  Gastrointestinal:  Soft, Non-tender	  Skin: No rashes, No ecchymoses, No cyanosis	  Neurologic: Non-focal  Extremities: Normal range of motion, No clubbing, cyanosis   Vascular: warm extremities        LABS:	 	    CBC Full  -  ( 06 Sep 2017 06:53 )  WBC Count : 6.4 K/uL  Hemoglobin : 13.1 g/dL  Hematocrit : 39.4 %  Platelet Count - Automated : 128 K/uL  Mean Cell Volume : 99.4 fl  Mean Cell Hemoglobin : 33.0 pg  Mean Cell Hemoglobin Concentration : 33.2 gm/dL  Auto Neutrophil # : 3.8 K/uL  Auto Lymphocyte # : 1.6 K/uL  Auto Monocyte # : 0.6 K/uL  Auto Eosinophil # : 0.4 K/uL  Auto Basophil # : 0.0 K/uL  Auto Neutrophil % : 59.2 %  Auto Lymphocyte % : 24.8 %  Auto Monocyte % : 9.5 %  Auto Eosinophil % : 5.7 %  Auto Basophil % : 0.8 %    09-06    134<L>  |  100  |  26<H>  ----------------------------<  114<H>  4.3   |  21<L>  |  0.83  09-05    133<L>  |  99  |  26<H>  ----------------------------<  183<H>  4.1   |  21<L>  |  0.79    Ca    8.9      06 Sep 2017 06:53  Ca    8.9      05 Sep 2017 23:40  Phos  2.9     09-06  Phos  2.2     09-05  Mg     1.9     09-06  Mg     1.8     09-05    TPro  6.3  /  Alb  3.2<L>  /  TBili  0.4  /  DBili  x   /  AST  31  /  ALT  54<H>  /  AlkPhos  94  09-06  TPro  6.6  /  Alb  3.5  /  TBili  0.5  /  DBili  x   /  AST  32  /  ALT  60<H>  /  AlkPhos  105  09-05      proBNP:     TELEMETRY: 	 [id number], frequent ventricular ectopy.

## 2017-09-06 NOTE — PROGRESS NOTE ADULT - ASSESSMENT
62M NICM  (LVDD 6.7 cm) HFrEF, (LVEF 10%), ACC/AHA stage D with NYHA class IV symptoms, pw abd pain, elevated lactic acid. Initial RHC with depressed CI (0.6).     ADHF   - today  p110 , 108/72,     RA 5, Pa 53/24(35) pcwp 14  i/o  1280/4100  - SNP stopped yesterday at 10PM  - milrinone @ 0.375  - lasix 40 IV QD  -hydralazine 50q8hr  - This AM, MV sat=50 CO/CI 2.9/1.45,   - last night @ 2400, MV sat =55 on SNP and milrinone    ?CAD  - atorvastatin 20mg    Abd pain  - on pantoprazole 40 BID,  -sucralfate      LVAD eval  - apprec ID recs.   - apprec psych eval  - Will need COREY to assess MR, need for surgical intervention, ? mel bush

## 2017-09-06 NOTE — PROGRESS NOTE ADULT - ASSESSMENT
61 yo man originally from [facility], without a reported history of any infections requiring hospitalizations. Patient was feeling at his baseline until a day after arriving in [facility] on 8/7 to [place].  Denies fevers or chills or acute intercurrent illnesses.    I see no contra-indications to a possible LVAD from an ID perspective    Would send screening tests for  Quantiferon Gold is pending  RPR is negative  RVP is negative

## 2017-09-06 NOTE — PROGRESS NOTE ADULT - ATTENDING COMMENTS
Patient is seen and examined with fellow, NP and the CCU house-staff. I agree with the history, physical and the assessment and plan.  will repeat a TTE to re-assess for the LV thrombus  c/w LVAD evaluation

## 2017-09-06 NOTE — PROGRESS NOTE ADULT - SUBJECTIVE AND OBJECTIVE BOX
Patient is a 62y old  Male who presents with a chief complaint of SOB (25 Aug 2017 22:27)    HPI:    Patient is a 62 years old Creole speaking Male with no no significant PMH who presented 6 days ago with SOB and chest pain. Patient states that he had no hx of heart failure or MI. He was vacationing in [facility] for the past month and his symptoms began a week prior to admission. He endorses chest pain which was substernal, non radiating and worse with exertion along with non productive cough and PND. No orthopnea or weight changes. He also endorses anorexia and weight loss. No recent sickness per se and no known sick contacts. He sought medical attention in [facility] but refused to be admitted to the hospital there. [place] the [place] and came to [facility] ED. Patient found to be tachycardiac and hypotensive.     During his hospital stay, patient had had a negative CTA but a newly diagnosed systolic CHF with EF 10-15% and severely dilated LA, Severe MR, TR. RHC showed PA systolic pressure of 54, LVEDP 32, CO 1.15, CI 0.63 and EF 10%. Admitted to CCU--started on afterload reduction--nitrprusside switched to Milrinone with improvement in cardiac indices. LVAD evaluation in process     Overnight Event: No acute events. Denies CP, SOB, or abdominal pain. B. Excellent urine output     REVIEW OF SYSTEMS:    GEN: no fevers, chills, no night sweats no weight loss , no swollen lymph nodes    EYES: No blurry vision , no changes in vision  ENT: no sores, no runny nose, no sore throat   PULM: no cough, no shortness of breath   CARD: no chest pain, no palpitations    GI : no abdominal pain , no nausea, no vomiting  : no burning urination, no change in color of urine, no flank pain, no blood in urine   MSK: no swelling   SKIN: no bruising or bleeding, no sores in mouth  , no yellowing of the skin  Neuro: no lightheadedness, no headaches, no weakness, no fainting, no confusion , no seizures    MEDICATIONS  (STANDING):  pantoprazole    Tablet 40 milliGRAM(s) Oral two times a day before meals  sucralfate suspension 1 Gram(s) Oral four times a day  atorvastatin 20 milliGRAM(s) Oral at bedtime  tiotropium 18 MICROgram(s) Capsule 1 Capsule(s) Inhalation daily  multivitamin 1 Tablet(s) Oral daily  heparin  Injectable 5000 Unit(s) SubCutaneous every 12 hours  buDESOnide 160 MICROgram(s)/formoterol 4.5 MICROgram(s) Inhaler 2 Puff(s) Inhalation two times a day  nitroprusside Infusion 0.7 MICROgram(s)/kG/Min (6.3 mL/Hr) IV Continuous <Continuous>  senna 2 Tablet(s) Oral at bedtime  docusate sodium 100 milliGRAM(s) Oral three times a day  polyethylene glycol 3350 17 Gram(s) Oral daily  hydrALAZINE 50 milliGRAM(s) Oral every 8 hours  milrinone Infusion 0.375 MICROgram(s)/kG/Min (6.75 mL/Hr) IV Continuous <Continuous>  furosemide   Injectable 40 milliGRAM(s) IV Push daily    MEDICATIONS  (PRN):  ondansetron Injectable 4 milliGRAM(s) IV Push every 6 hours PRN Nausea and/or Vomiting  aluminum hydroxide/magnesium hydroxide/simethicone Suspension 30 milliLiter(s) Oral every 4 hours PRN Dyspepsia      PHYSICAL EXAM:    ICU Vital Signs Last 24 Hrs  T(C): 36.7 (06 Sep 2017 05:00), Max: 37.1 (06 Sep 2017 00:00)  T(F): 98.1 (06 Sep 2017 05:00), Max: 98.8 (06 Sep 2017 00:00)  HR: 104 (06 Sep 2017 07:00) (100 - 118)  BP: 111/82 (05 Sep 2017 13:00) (111/82 - 111/82)  BP(mean): 90 (05 Sep 2017 13:00) (90 - 90)  ABP: 104/68 (06 Sep 2017 07:00) (92/58 - 136/84)  ABP(mean): 80 (06 Sep 2017 07:00) (66 - 100)  RR: 21 (06 Sep 2017 07:00) (17 - 37)  SpO2: 96% (06 Sep 2017 07:00) (80% - 100%)      Is/Os: Net neg -2.8 Liters       PHYSICAL EXAM:  GENERAL: NAD, Cachexia   HEAD:  Atraumatic, Normocephalic  EYES: EOMI, PERRLA, conjunctiva and sclera clear  NECK: Supple, No JVD  CHEST/LUNG: Clear to auscultation bilaterally; No wheeze  HEART: Regular rate and rhythm; No murmurs, rubs, or gallops  ABDOMEN: Soft, Nontender, Nondistended; Bowel sounds present  EXTREMITIES:  2+ Peripheral Pulses, No clubbing, cyanosis, or edema  PSYCH: AAOx3  NEUROLOGY: non-focal  SKIN: No rashes or lesions    LABS:	 	                                              13.1   6.4   )-----------( 128      ( 06 Sep 2017 06:53 )             39.4                09-06    134<L>  |  100  |  26<H>  ----------------------------<  114<H>  4.3   |  21<L>  |  0.83    Ca    8.9      06 Sep 2017 06:53  Phos  2.9     09-06  Mg     1.9     09-06    TPro  6.3  /  Alb  3.2<L>  /  TBili  0.4  /  DBili  x   /  AST  31  /  ALT  54<H>  /  AlkPhos  94  09-06        < from: TTE with Doppler (w/Cont) (08.28.17 @ 22:35) >  Dimensions:    Normal Values:  LA:     4.2    2.0 - 4.0 cm  Ao:     3.3    2.0 - 3.8 cm  SEPTUM: 0.7    0.6 - 1.2 cm  PWT:    0.8    0.6 - 1.1 cm  LVIDd:  6.7    3.0 - 5.6 cm  LVIDs:  6.4    1.8 - 4.0 cm  Derived variables:  LVMI: 115 g/m2  RWT: 0.23  Fractional short: 4 %  EF (Visual Estimate): 10-15 %  ------------------------------------------------------------------------  Observations:  Mitral Valve: Thickened mitral valve. Dilated mitral valve  annulus. Tethered posterior leaflet.  Severe, eccentric,  posteriorly-directed mitral regurgitation. Flow reversal is  seen in the pulmonary veins.  Aortic Valve/Aorta: Thickened aortic valve.  Aortic Root: 3.3 cm.  Left Atrium: Severely dilatedleft atrium.  LA volume index  = 63 cc/m2.  Left Ventricle: Endocardial visualization enhanced with  intravenous injection of echo contrast (Definity). Severe  global left ventricular systolic dysfunction with regional  variation (the mid to distal anterior wall and septum are  akinetic). Spontaneous echocardiographic contrast is seen  in the left ventricle. Ill-defined mass at the apex of the  left ventricle. May be left ventricular trabeculation or  thrombus. Consider MRI to further evaluate. Moderate to  severe left ventricular enlargement. E/e' equals 20,  consistent with elevated left ventricular end diastolic  pressure.  Right Heart: Moderate right atrial enlargement. Right  ventricular enlargement with decreased right ventricular  systolic function.  Diastolic septal flattening consistent  with right ventricular volume overload.  Thickened  tricuspid valve. Moderate-severe tricuspid regurgitation.  Normal pulmonic valve. Mild pulmonic regurgitation.  Pericardium/Pleura: Trace pericardial effusion.  Bilateral pleural effusions.  ------------------------------------------------------------------------  Conclusions:  1. Thickened mitral valve. Dilated mitral valve annulus.  Tethered posterior leaflet.  Severe, eccentric,  posteriorly-directed mitral regurgitation. Flow reversal is  seen in the pulmonary veins.  2. Thickened aortic valve.  3. Severely dilated left atrium.  4. Moderate to severe left ventricular enlargement.  5. Endocardial visualization enhanced with intravenous  injection of echo contrast (Definity). Severe global left  ventricular systolic dysfunction with regional variation  (the mid to distal anterior wall and septum are akinetic).  Spontaneous echocardiographic contrast is seen in the left  ventricle. Ill-defined, mass at the apex of the left  ventricle. May be left ventricular trabeculation or  thrombus. Consider MRI to further evaluate.  6. E/e' equals 20, consistent with elevated left  ventricular end diastolic pressure.  7. Moderate right atrial enlargement.  8. Right ventricular enlargement with decreased right  ventricular systolic function.  Diastolic septal flattening  consistent with right ventricular volume overload.  9. Thickened tricuspid valve. Moderate-severe tricuspid  regurgitation.  10. Trace pericardial effusion.  ------------------------------------------------------------------------  Confirmed on  8/28/2017 - 15:54:57 by Matias Stubbs M.D.  ------------------------------------------------------------------------    < end of copied text >

## 2017-09-06 NOTE — PROGRESS NOTE ADULT - ATTENDING COMMENTS
Patient seen, agree with above. Patient understands his advanced heart failure and what LVAD management would entail. Patient currently without symptoms.

## 2017-09-06 NOTE — CHART NOTE - NSCHARTNOTEFT_GEN_A_CORE
====================  CCU MIDNIGHT ROUNDS  ====================    RICKY JOINT  [id number]  Patient is a 62y old  Male who presents with a chief complaint of SOB (25 Aug 2017 22:27)      ====================  SUMMARY:  ====================  Patient is a 62 years old Creole speaking Male with no no significant PMH who presented 6 days ago with SOB and chest pain. Patient states that he had no hx of heart failure or MI. He was vacationing in [facility] for the past month and his symptoms began a week prior to admission. He endorses chest pain which was substernal, non radiating and worse with exertion along with non productive cough and PND. No orthopnea or weight changes. He also endorses anorexia and weight loss. No recent sickness per se and no known sick contacts. He sought medical attention in [facility] but refused to be admitted to the hospital there. [place] the [place] and came to [facility]. Patient found to be tachycardiac and hypotensive.     During his hospital stay, patient had had a negative CTA but a newly diagnosed systolic CHF with EF 10-15% and severely dilated LA, Severe MR, TR. RHC showed PA systolic pressure of 54, LVEDP 32, CO 1.15, CI 0.63 and EF 10%. Admitted to CCU--started on afterload reduction--nitrprusside switched to Milrinone with improvement in cardiac indices. LVAD evaluation in process       ====================  NEW EVENTS:  ====================    No events ON. Patient denies any CP SOB ERICK orthopnea, f nvd. Started on hep gtt for thrombus and low EF by day team. CO: CI:     ====================  VITALS (Last 12 hrs):  ====================    T(C): 36.8 (09-06-17 @ 19:00), Max: 36.8 (09-06-17 @ 19:00)  T(F): 98.2 (09-06-17 @ 19:00), Max: 98.2 (09-06-17 @ 19:00)  HR: 112 (09-06-17 @ 22:00) (106 - 116)  BP: --  BP(mean): --  ABP: 112/66 (09-06-17 @ 22:00) (90/54 - 120/80)  ABP(mean): 80 (09-06-17 @ 22:00) (2 - 90)  RR: 20 (09-06-17 @ 19:00) (19 - 26)  SpO2: 99% (09-06-17 @ 22:00) (93% - 99%)  Wt(kg): --  CVP(mm Hg): 12 (09-06-17 @ 22:00) (1 - 13)  CVP(cm H2O): --  CO: 6.8 (09-06-17 @ 14:30) (6.8 - 6.8)  CI: 3.9 (09-06-17 @ 14:30) (3.9 - 3.9)  PA: 56/27 (09-06-17 @ 22:00) (26/6 - 56/27)  PA(mean): 40 (09-06-17 @ 22:00) (11 - 40)  PCWP: --  SVR: 764 (09-06-17 @ 14:30) (764 - 764)  PVR: --    I&O's Summary    05 Sep 2017 07:01  -  06 Sep 2017 07:00  --------------------------------------------------------  IN: 1282.6 mL / OUT: 4100 mL / NET: -2817.4 mL    06 Sep 2017 07:01  -  06 Sep 2017 23:02  --------------------------------------------------------  IN: 1342.8 mL / OUT: 3500 mL / NET: -2157.2 mL            ====================  NEW LABS:  ====================                          13.1   6.4   )-----------( 128      ( 06 Sep 2017 06:53 )             39.4     09-06    134<L>  |  100  |  26<H>  ----------------------------<  114<H>  4.3   |  21<L>  |  0.83    Ca    8.9      06 Sep 2017 06:53  Phos  2.9     09-06  Mg     1.9     09-06    TPro  6.3  /  Alb  3.2<L>  /  TBili  0.4  /  DBili  x   /  AST  31  /  ALT  54<H>  /  AlkPhos  94  09-06          ABG - ( 06 Sep 2017 06:51 )  pH: 7.48  /  pCO2: 32    /  pO2: 81    / HCO3: 23    / Base Excess: .8    /  SaO2: 96                    ====================  PLAN:  ====================  - Continue Hydralazine for afterload reduction  - Continue Milrinone gtt  - Monitor I & O's and electrolytes; replete as needed  - LVAD w/u per HF  - OOB to chair as tolerated        Dion Hernandez MD  PGY2 Internal Medicine Resident  Pager: [phone] - [facility]  [id number] - [facility] ====================  CCU MIDNIGHT ROUNDS  ====================    RICKY JOINT  [id number]  Patient is a 62y old  Male who presents with a chief complaint of SOB (25 Aug 2017 22:27)      ====================  SUMMARY:  ====================  Patient is a 62 years old Creole speaking Male with no no significant PMH who presented 6 days ago with SOB and chest pain. Patient states that he had no hx of heart failure or MI. He was vacationing in [facility] for the past month and his symptoms began a week prior to admission. He endorses chest pain which was substernal, non radiating and worse with exertion along with non productive cough and PND. No orthopnea or weight changes. He also endorses anorexia and weight loss. No recent sickness per se and no known sick contacts. He sought medical attention in [facility] but refused to be admitted to the hospital there. [place] the [place] and came to [facility]. Patient found to be tachycardiac and hypotensive.     During his hospital stay, patient had had a negative CTA but a newly diagnosed systolic CHF with EF 10-15% and severely dilated LA, Severe MR, TR. RHC showed PA systolic pressure of 54, LVEDP 32, CO 1.15, CI 0.63 and EF 10%. Admitted to CCU--started on afterload reduction--nitrprusside switched to Milrinone with improvement in cardiac indices. LVAD evaluation in process       ====================  NEW EVENTS:  ====================    No events ON. Patient denies any CP SOB ERICK orthopnea, f nvd. Started on hep gtt for thrombus and low EF by day team. CO: 2.628 CI: 1.50    ====================  VITALS (Last 12 hrs):  ====================    T(C): 36.8 (09-06-17 @ 19:00), Max: 36.8 (09-06-17 @ 19:00)  T(F): 98.2 (09-06-17 @ 19:00), Max: 98.2 (09-06-17 @ 19:00)  HR: 112 (09-06-17 @ 22:00) (106 - 116)  BP: --  BP(mean): --  ABP: 112/66 (09-06-17 @ 22:00) (90/54 - 120/80)  ABP(mean): 80 (09-06-17 @ 22:00) (2 - 90)  RR: 20 (09-06-17 @ 19:00) (19 - 26)  SpO2: 99% (09-06-17 @ 22:00) (93% - 99%)  Wt(kg): --  CVP(mm Hg): 12 (09-06-17 @ 22:00) (1 - 13)  CVP(cm H2O): --  CO: 6.8 (09-06-17 @ 14:30) (6.8 - 6.8)  CI: 3.9 (09-06-17 @ 14:30) (3.9 - 3.9)  PA: 56/27 (09-06-17 @ 22:00) (26/6 - 56/27)  PA(mean): 40 (09-06-17 @ 22:00) (11 - 40)  PCWP: --  SVR: 764 (09-06-17 @ 14:30) (764 - 764)  PVR: --    I&O's Summary    05 Sep 2017 07:01  -  06 Sep 2017 07:00  --------------------------------------------------------  IN: 1282.6 mL / OUT: 4100 mL / NET: -2817.4 mL    06 Sep 2017 07:01  -  06 Sep 2017 23:02  --------------------------------------------------------  IN: 1342.8 mL / OUT: 3500 mL / NET: -2157.2 mL            ====================  NEW LABS:  ====================                          13.1   6.4   )-----------( 128      ( 06 Sep 2017 06:53 )             39.4     09-06    134<L>  |  100  |  26<H>  ----------------------------<  114<H>  4.3   |  21<L>  |  0.83    Ca    8.9      06 Sep 2017 06:53  Phos  2.9     09-06  Mg     1.9     09-06    TPro  6.3  /  Alb  3.2<L>  /  TBili  0.4  /  DBili  x   /  AST  31  /  ALT  54<H>  /  AlkPhos  94  09-06          ABG - ( 06 Sep 2017 06:51 )  pH: 7.48  /  pCO2: 32    /  pO2: 81    / HCO3: 23    / Base Excess: .8    /  SaO2: 96                    ====================  PLAN:  ====================  - Continue Hydralazine for afterload reduction  - Continue Milrinone gtt  - Monitor I & O's and electrolytes; replete as needed  - LVAD w/u per HF  - OOB to chair as tolerated        Dion Hernandez MD  PGY2 Internal Medicine Resident  Pager: [phone] - [facility]  [phone] - [facility]

## 2017-09-07 LAB
ALBUMIN SERPL ELPH-MCNC: 3.2 G/DL — LOW (ref 3.3–5)
ALBUMIN SERPL ELPH-MCNC: 3.3 G/DL — SIGNIFICANT CHANGE UP (ref 3.3–5)
ALBUMIN SERPL ELPH-MCNC: 3.5 G/DL — SIGNIFICANT CHANGE UP (ref 3.3–5)
ALP SERPL-CCNC: 104 U/L — SIGNIFICANT CHANGE UP (ref 40–120)
ALP SERPL-CCNC: 87 U/L — SIGNIFICANT CHANGE UP (ref 40–120)
ALP SERPL-CCNC: 97 U/L — SIGNIFICANT CHANGE UP (ref 40–120)
ALT FLD-CCNC: 54 U/L RC — HIGH (ref 10–45)
ALT FLD-CCNC: 54 U/L RC — HIGH (ref 10–45)
ALT FLD-CCNC: 57 U/L RC — HIGH (ref 10–45)
ANION GAP SERPL CALC-SCNC: 13 MMOL/L — SIGNIFICANT CHANGE UP (ref 5–17)
ANION GAP SERPL CALC-SCNC: 13 MMOL/L — SIGNIFICANT CHANGE UP (ref 5–17)
ANION GAP SERPL CALC-SCNC: 15 MMOL/L — SIGNIFICANT CHANGE UP (ref 5–17)
APTT BLD: 55.2 SEC — HIGH (ref 27.5–37.4)
APTT BLD: 88.2 SEC — HIGH (ref 27.5–37.4)
APTT BLD: 96.6 SEC — HIGH (ref 27.5–37.4)
AST SERPL-CCNC: 32 U/L — SIGNIFICANT CHANGE UP (ref 10–40)
AST SERPL-CCNC: 35 U/L — SIGNIFICANT CHANGE UP (ref 10–40)
AST SERPL-CCNC: 41 U/L — HIGH (ref 10–40)
BASE EXCESS BLDMV CALC-SCNC: 2.7 MMOL/L — SIGNIFICANT CHANGE UP (ref -3–3)
BASE EXCESS BLDMV CALC-SCNC: 3.3 MMOL/L — HIGH (ref -3–3)
BASOPHILS # BLD AUTO: 0.1 K/UL — SIGNIFICANT CHANGE UP (ref 0–0.2)
BASOPHILS # BLD AUTO: 0.1 K/UL — SIGNIFICANT CHANGE UP (ref 0–0.2)
BASOPHILS NFR BLD AUTO: 1 % — SIGNIFICANT CHANGE UP (ref 0–2)
BASOPHILS NFR BLD AUTO: 1 % — SIGNIFICANT CHANGE UP (ref 0–2)
BILIRUB SERPL-MCNC: 0.4 MG/DL — SIGNIFICANT CHANGE UP (ref 0.2–1.2)
BUN SERPL-MCNC: 22 MG/DL — SIGNIFICANT CHANGE UP (ref 7–23)
BUN SERPL-MCNC: 23 MG/DL — SIGNIFICANT CHANGE UP (ref 7–23)
BUN SERPL-MCNC: 25 MG/DL — HIGH (ref 7–23)
CALCIUM SERPL-MCNC: 8.9 MG/DL — SIGNIFICANT CHANGE UP (ref 8.4–10.5)
CALCIUM SERPL-MCNC: 9.1 MG/DL — SIGNIFICANT CHANGE UP (ref 8.4–10.5)
CALCIUM SERPL-MCNC: 9.8 MG/DL — SIGNIFICANT CHANGE UP (ref 8.4–10.5)
CHLORIDE SERPL-SCNC: 101 MMOL/L — SIGNIFICANT CHANGE UP (ref 96–108)
CHLORIDE SERPL-SCNC: 101 MMOL/L — SIGNIFICANT CHANGE UP (ref 96–108)
CHLORIDE SERPL-SCNC: 99 MMOL/L — SIGNIFICANT CHANGE UP (ref 96–108)
CK MB BLD-MCNC: 1.4 % — SIGNIFICANT CHANGE UP (ref 0–3.5)
CK MB CFR SERPL CALC: 2.9 NG/ML — SIGNIFICANT CHANGE UP (ref 0–6.7)
CK SERPL-CCNC: 207 U/L — HIGH (ref 30–200)
CO2 BLDMV-SCNC: 29 MMOL/L — SIGNIFICANT CHANGE UP (ref 21–29)
CO2 BLDMV-SCNC: 29 MMOL/L — SIGNIFICANT CHANGE UP (ref 21–29)
CO2 SERPL-SCNC: 20 MMOL/L — LOW (ref 22–31)
CO2 SERPL-SCNC: 20 MMOL/L — LOW (ref 22–31)
CO2 SERPL-SCNC: 24 MMOL/L — SIGNIFICANT CHANGE UP (ref 22–31)
CREAT SERPL-MCNC: 0.74 MG/DL — SIGNIFICANT CHANGE UP (ref 0.5–1.3)
CREAT SERPL-MCNC: 0.75 MG/DL — SIGNIFICANT CHANGE UP (ref 0.5–1.3)
CREAT SERPL-MCNC: 0.82 MG/DL — SIGNIFICANT CHANGE UP (ref 0.5–1.3)
EOSINOPHIL # BLD AUTO: 0.4 K/UL — SIGNIFICANT CHANGE UP (ref 0–0.5)
EOSINOPHIL # BLD AUTO: 0.4 K/UL — SIGNIFICANT CHANGE UP (ref 0–0.5)
EOSINOPHIL NFR BLD AUTO: 5.9 % — SIGNIFICANT CHANGE UP (ref 0–6)
EOSINOPHIL NFR BLD AUTO: 6.6 % — HIGH (ref 0–6)
GAS PNL BLDA: SIGNIFICANT CHANGE UP
GAS PNL BLDA: SIGNIFICANT CHANGE UP
GAS PNL BLDMV: SIGNIFICANT CHANGE UP
GAS PNL BLDMV: SIGNIFICANT CHANGE UP
GAS PNL BLDV: SIGNIFICANT CHANGE UP
GLUCOSE SERPL-MCNC: 107 MG/DL — HIGH (ref 70–99)
GLUCOSE SERPL-MCNC: 142 MG/DL — HIGH (ref 70–99)
GLUCOSE SERPL-MCNC: 172 MG/DL — HIGH (ref 70–99)
HCO3 BLDMV-SCNC: 27 MMOL/L — SIGNIFICANT CHANGE UP (ref 20–28)
HCO3 BLDMV-SCNC: 28 MMOL/L — SIGNIFICANT CHANGE UP (ref 20–28)
HCT VFR BLD CALC: 39.8 % — SIGNIFICANT CHANGE UP (ref 39–50)
HCT VFR BLD CALC: 40.3 % — SIGNIFICANT CHANGE UP (ref 39–50)
HGB BLD-MCNC: 13.1 G/DL — SIGNIFICANT CHANGE UP (ref 13–17)
HGB BLD-MCNC: 13.3 G/DL — SIGNIFICANT CHANGE UP (ref 13–17)
HOROWITZ INDEX BLDMV+IHG-RTO: 21 — SIGNIFICANT CHANGE UP
HOROWITZ INDEX BLDMV+IHG-RTO: 21 — SIGNIFICANT CHANGE UP
INR BLD: 1.09 RATIO — SIGNIFICANT CHANGE UP (ref 0.88–1.16)
INR BLD: 1.1 RATIO — SIGNIFICANT CHANGE UP (ref 0.88–1.16)
LYMPHOCYTES # BLD AUTO: 1.6 K/UL — SIGNIFICANT CHANGE UP (ref 1–3.3)
LYMPHOCYTES # BLD AUTO: 1.7 K/UL — SIGNIFICANT CHANGE UP (ref 1–3.3)
LYMPHOCYTES # BLD AUTO: 24.5 % — SIGNIFICANT CHANGE UP (ref 13–44)
LYMPHOCYTES # BLD AUTO: 24.9 % — SIGNIFICANT CHANGE UP (ref 13–44)
MAGNESIUM SERPL-MCNC: 1.8 MG/DL — SIGNIFICANT CHANGE UP (ref 1.6–2.6)
MAGNESIUM SERPL-MCNC: 1.9 MG/DL — SIGNIFICANT CHANGE UP (ref 1.6–2.6)
MAGNESIUM SERPL-MCNC: 1.9 MG/DL — SIGNIFICANT CHANGE UP (ref 1.6–2.6)
MCHC RBC-ENTMCNC: 32.4 PG — SIGNIFICANT CHANGE UP (ref 27–34)
MCHC RBC-ENTMCNC: 32.5 GM/DL — SIGNIFICANT CHANGE UP (ref 32–36)
MCHC RBC-ENTMCNC: 33.2 PG — SIGNIFICANT CHANGE UP (ref 27–34)
MCHC RBC-ENTMCNC: 33.5 GM/DL — SIGNIFICANT CHANGE UP (ref 32–36)
MCV RBC AUTO: 99.2 FL — SIGNIFICANT CHANGE UP (ref 80–100)
MCV RBC AUTO: 99.5 FL — SIGNIFICANT CHANGE UP (ref 80–100)
MONOCYTES # BLD AUTO: 0.7 K/UL — SIGNIFICANT CHANGE UP (ref 0–0.9)
MONOCYTES # BLD AUTO: 0.7 K/UL — SIGNIFICANT CHANGE UP (ref 0–0.9)
MONOCYTES NFR BLD AUTO: 10.2 % — SIGNIFICANT CHANGE UP (ref 2–14)
MONOCYTES NFR BLD AUTO: 11.1 % — SIGNIFICANT CHANGE UP (ref 2–14)
NEUTROPHILS # BLD AUTO: 3.6 K/UL — SIGNIFICANT CHANGE UP (ref 1.8–7.4)
NEUTROPHILS # BLD AUTO: 4 K/UL — SIGNIFICANT CHANGE UP (ref 1.8–7.4)
NEUTROPHILS NFR BLD AUTO: 56.3 % — SIGNIFICANT CHANGE UP (ref 43–77)
NEUTROPHILS NFR BLD AUTO: 58.4 % — SIGNIFICANT CHANGE UP (ref 43–77)
O2 CT VFR BLD CALC: 29 MMHG — LOW (ref 30–65)
O2 CT VFR BLD CALC: 34 MMHG — SIGNIFICANT CHANGE UP (ref 30–65)
PCO2 BLDMV: 44 MMHG — SIGNIFICANT CHANGE UP (ref 30–65)
PCO2 BLDMV: 45 MMHG — SIGNIFICANT CHANGE UP (ref 30–65)
PH BLDMV: 7.41 — SIGNIFICANT CHANGE UP (ref 7.32–7.45)
PH BLDMV: 7.41 — SIGNIFICANT CHANGE UP (ref 7.32–7.45)
PHOSPHATE SERPL-MCNC: 3.2 MG/DL — SIGNIFICANT CHANGE UP (ref 2.5–4.5)
PHOSPHATE SERPL-MCNC: 3.9 MG/DL — SIGNIFICANT CHANGE UP (ref 2.5–4.5)
PHOSPHATE SERPL-MCNC: 4.1 MG/DL — SIGNIFICANT CHANGE UP (ref 2.5–4.5)
PLATELET # BLD AUTO: 135 K/UL — LOW (ref 150–400)
PLATELET # BLD AUTO: 138 K/UL — LOW (ref 150–400)
POTASSIUM SERPL-MCNC: 4.4 MMOL/L — SIGNIFICANT CHANGE UP (ref 3.5–5.3)
POTASSIUM SERPL-SCNC: 4.4 MMOL/L — SIGNIFICANT CHANGE UP (ref 3.5–5.3)
PROT SERPL-MCNC: 6.3 G/DL — SIGNIFICANT CHANGE UP (ref 6–8.3)
PROT SERPL-MCNC: 6.5 G/DL — SIGNIFICANT CHANGE UP (ref 6–8.3)
PROT SERPL-MCNC: 7 G/DL — SIGNIFICANT CHANGE UP (ref 6–8.3)
PROTHROM AB SERPL-ACNC: 11.9 SEC — SIGNIFICANT CHANGE UP (ref 9.8–12.7)
PROTHROM AB SERPL-ACNC: 12 SEC — SIGNIFICANT CHANGE UP (ref 9.8–12.7)
RBC # BLD: 4.01 M/UL — LOW (ref 4.2–5.8)
RBC # BLD: 4.05 M/UL — LOW (ref 4.2–5.8)
RBC # FLD: 14.3 % — SIGNIFICANT CHANGE UP (ref 10.3–14.5)
RBC # FLD: 14.5 % — SIGNIFICANT CHANGE UP (ref 10.3–14.5)
SAO2 % BLDMV: 46 % — LOW (ref 60–90)
SAO2 % BLDMV: 58 % — LOW (ref 60–90)
SODIUM SERPL-SCNC: 134 MMOL/L — LOW (ref 135–145)
SODIUM SERPL-SCNC: 136 MMOL/L — SIGNIFICANT CHANGE UP (ref 135–145)
SODIUM SERPL-SCNC: 136 MMOL/L — SIGNIFICANT CHANGE UP (ref 135–145)
TROPONIN T SERPL-MCNC: 0.03 NG/ML — SIGNIFICANT CHANGE UP (ref 0–0.06)
WBC # BLD: 6.3 K/UL — SIGNIFICANT CHANGE UP (ref 3.8–10.5)
WBC # BLD: 6.9 K/UL — SIGNIFICANT CHANGE UP (ref 3.8–10.5)
WBC # FLD AUTO: 6.3 K/UL — SIGNIFICANT CHANGE UP (ref 3.8–10.5)
WBC # FLD AUTO: 6.9 K/UL — SIGNIFICANT CHANGE UP (ref 3.8–10.5)

## 2017-09-07 PROCEDURE — 93010 ELECTROCARDIOGRAM REPORT: CPT | Mod: 77

## 2017-09-07 PROCEDURE — 71010: CPT | Mod: 26

## 2017-09-07 PROCEDURE — 93321 DOPPLER ECHO F-UP/LMTD STD: CPT | Mod: 26

## 2017-09-07 PROCEDURE — 99233 SBSQ HOSP IP/OBS HIGH 50: CPT

## 2017-09-07 PROCEDURE — 93308 TTE F-UP OR LMTD: CPT | Mod: 26

## 2017-09-07 PROCEDURE — 93010 ELECTROCARDIOGRAM REPORT: CPT

## 2017-09-07 RX ORDER — MAGNESIUM SULFATE 500 MG/ML
1 VIAL (ML) INJECTION ONCE
Qty: 0 | Refills: 0 | Status: COMPLETED | OUTPATIENT
Start: 2017-09-07 | End: 2017-09-07

## 2017-09-07 RX ADMIN — BUDESONIDE AND FORMOTEROL FUMARATE DIHYDRATE 2 PUFF(S): 160; 4.5 AEROSOL RESPIRATORY (INHALATION) at 09:17

## 2017-09-07 RX ADMIN — Medication 1 GRAM(S): at 11:15

## 2017-09-07 RX ADMIN — Medication 100 MILLIGRAM(S): at 06:17

## 2017-09-07 RX ADMIN — Medication 1 TABLET(S): at 11:15

## 2017-09-07 RX ADMIN — HEPARIN SODIUM 1200 UNIT(S)/HR: 5000 INJECTION INTRAVENOUS; SUBCUTANEOUS at 01:09

## 2017-09-07 RX ADMIN — HEPARIN SODIUM 1200 UNIT(S)/HR: 5000 INJECTION INTRAVENOUS; SUBCUTANEOUS at 06:24

## 2017-09-07 RX ADMIN — PANTOPRAZOLE SODIUM 40 MILLIGRAM(S): 20 TABLET, DELAYED RELEASE ORAL at 18:01

## 2017-09-07 RX ADMIN — Medication 1 GRAM(S): at 18:01

## 2017-09-07 RX ADMIN — ATORVASTATIN CALCIUM 20 MILLIGRAM(S): 80 TABLET, FILM COATED ORAL at 21:34

## 2017-09-07 RX ADMIN — SENNA PLUS 2 TABLET(S): 8.6 TABLET ORAL at 21:34

## 2017-09-07 RX ADMIN — Medication 50 MILLIGRAM(S): at 13:43

## 2017-09-07 RX ADMIN — Medication 100 MILLIGRAM(S): at 21:34

## 2017-09-07 RX ADMIN — HEPARIN SODIUM 1200 UNIT(S)/HR: 5000 INJECTION INTRAVENOUS; SUBCUTANEOUS at 13:44

## 2017-09-07 RX ADMIN — Medication 100 MILLIGRAM(S): at 13:43

## 2017-09-07 RX ADMIN — TIOTROPIUM BROMIDE 1 CAPSULE(S): 18 CAPSULE ORAL; RESPIRATORY (INHALATION) at 11:51

## 2017-09-07 RX ADMIN — POLYETHYLENE GLYCOL 3350 17 GRAM(S): 17 POWDER, FOR SOLUTION ORAL at 11:15

## 2017-09-07 RX ADMIN — Medication 40 MILLIGRAM(S): at 06:17

## 2017-09-07 RX ADMIN — BUDESONIDE AND FORMOTEROL FUMARATE DIHYDRATE 2 PUFF(S): 160; 4.5 AEROSOL RESPIRATORY (INHALATION) at 21:51

## 2017-09-07 RX ADMIN — PANTOPRAZOLE SODIUM 40 MILLIGRAM(S): 20 TABLET, DELAYED RELEASE ORAL at 06:17

## 2017-09-07 RX ADMIN — Medication 50 MILLIGRAM(S): at 21:34

## 2017-09-07 RX ADMIN — Medication 50 MILLIGRAM(S): at 06:17

## 2017-09-07 RX ADMIN — Medication 100 GRAM(S): at 13:43

## 2017-09-07 RX ADMIN — Medication 1 GRAM(S): at 06:18

## 2017-09-07 RX ADMIN — MILRINONE LACTATE 6.75 MICROGRAM(S)/KG/MIN: 1 INJECTION, SOLUTION INTRAVENOUS at 07:00

## 2017-09-07 NOTE — PROGRESS NOTE ADULT - PROBLEM SELECTOR PLAN 2
on milrinone, lasix, and hydralazine  LVAD per cardiology  today on IV heparin , for possible LV thrombus: ? embolization to abdominal vessels as a cause for pain? for eventual COREY

## 2017-09-07 NOTE — PROGRESS NOTE ADULT - ASSESSMENT
62M NICM  (LVDD 6.7 cm) HFrEF, (LVEF 10%), ACC/AHA stage D with NYHA class IV symptoms, pw abd pain, elevated lactic acid. Initial RHC with depressed CI (0.6).     a line removed o/n    ADHF  CVP  4-5 ,  PA 45/14 (30)  PCWP =19 @0500  - i/o 1800/4350  -2537   BP  90/50   -100/60   via a line  139lb on admit  -->129 today.   - milrinone @ 0.375  hydralazine 50 q8  lasix 40 IV qd  labs:  Na 134, Bun/Cr 23/0.74  MV sat 58 @ 0500      ? LV thrombus, embolized  - heparin gtt    ?CAD  - atorvastatin 20mg    Abd pain  - on pantoprazole 40 BID,  -sucralfate      LVAD eval  - Will need COREY to assess MR, need for surgical intervention, ? mel bush   - Will  likely be for COREY today.   - please note SNP is still ordered, please remove order to avoid confusion.

## 2017-09-07 NOTE — PROGRESS NOTE ADULT - SUBJECTIVE AND OBJECTIVE BOX
Patient is a 62y old  Male who presents with a chief complaint of SOB (25 Aug 2017 22:27)    HPI:    Patient is a 62 years old Creole speaking Male with no no significant PMH who presented 6 days ago with SOB and chest pain. Patient states that he had no hx of heart failure or MI. He was vacationing in [facility] for the past month and his symptoms began a week prior to admission. He endorses chest pain which was substernal, non radiating and worse with exertion along with non productive cough and PND. No orthopnea or weight changes. He also endorses anorexia and weight loss. No recent sickness per se and no known sick contacts. He sought medical attention in [facility] but refused to be admitted to the hospital there. [place] the [place] and came to [facility]. Patient found to be tachycardiac and hypotensive.     During his hospital stay, patient had had a negative CTA but a newly diagnosed systolic CHF with EF 10-15% and severely dilated LA, Severe MR, TR. RHC showed PA systolic pressure of 54, LVEDP 32, CO 1.15, CI 0.63 and EF 10%. Admitted to CCU--started on afterload reduction--nitrprusside switched to Milrinone with improvement in cardiac indices. LVAD evaluation in process     Overnight Event: No acute events. Denies CP, SOB, or abdominal pain. BM yesterday. Excellent urine output     REVIEW OF SYSTEMS:    GEN: no fevers, chills, no night sweats no weight loss , no swollen lymph nodes    EYES: No blurry vision , no changes in vision  ENT: no sores, no runny nose, no sore throat   PULM: no cough, no shortness of breath   CARD: no chest pain, no palpitations    GI : no abdominal pain , no nausea, no vomiting  : no burning urination, no change in color of urine, no flank pain, no blood in urine   MSK: no swelling   SKIN: no bruising or bleeding, no sores in mouth  , no yellowing of the skin  Neuro: no lightheadedness, no headaches, no weakness, no fainting, no confusion , no seizures    MEDICATIONS  (STANDING):  pantoprazole    Tablet 40 milliGRAM(s) Oral two times a day before meals  sucralfate suspension 1 Gram(s) Oral four times a day  atorvastatin 20 milliGRAM(s) Oral at bedtime  tiotropium 18 MICROgram(s) Capsule 1 Capsule(s) Inhalation daily  multivitamin 1 Tablet(s) Oral daily  buDESOnide 160 MICROgram(s)/formoterol 4.5 MICROgram(s) Inhaler 2 Puff(s) Inhalation two times a day  nitroprusside Infusion 0.7 MICROgram(s)/kG/Min (6.3 mL/Hr) IV Continuous <Continuous>  senna 2 Tablet(s) Oral at bedtime  docusate sodium 100 milliGRAM(s) Oral three times a day  polyethylene glycol 3350 17 Gram(s) Oral daily  milrinone Infusion 0.375 MICROgram(s)/kG/Min (6.75 mL/Hr) IV Continuous <Continuous>  furosemide   Injectable 40 milliGRAM(s) IV Push daily  hydrALAZINE 50 milliGRAM(s) Oral every 8 hours  heparin  Infusion.  Unit(s)/Hr (11 mL/Hr) IV Continuous <Continuous>    MEDICATIONS  (PRN):  ondansetron Injectable 4 milliGRAM(s) IV Push every 6 hours PRN Nausea and/or Vomiting  aluminum hydroxide/magnesium hydroxide/simethicone Suspension 30 milliLiter(s) Oral every 4 hours PRN Dyspepsia  heparin  Injectable 4500 Unit(s) IV Push every 6 hours PRN For aPTT less than 40  heparin  Injectable 2000 Unit(s) IV Push every 6 hours PRN For aPTT between 40 - 57        PHYSICAL EXAM:    ICU Vital Signs Last 24 Hrs  T(C): 36.4 (07 Sep 2017 04:00), Max: 36.8 (06 Sep 2017 19:00)  T(F): 97.5 (07 Sep 2017 04:00), Max: 98.2 (06 Sep 2017 19:00)  HR: 106 (07 Sep 2017 07:00) (104 - 118)  BP: 111/92 (07 Sep 2017 06:00) (111/92 - 111/92)  BP(mean): 98 (07 Sep 2017 06:00) (98 - 98)  ABP: 88/60 (07 Sep 2017 05:27) (84/44 - 120/80)  ABP(mean): 68 (07 Sep 2017 05:27) (2 - 90)  RR: 22 (07 Sep 2017 07:00) (17 - 29)  SpO2: 97% (07 Sep 2017 07:00) (93% - 99%)      Is/Os: Net neg -2.6 Liters       PHYSICAL EXAM:  GENERAL: NAD, Cachexia   HEAD:  Atraumatic, Normocephalic  EYES: EOMI, PERRLA, conjunctiva and sclera clear  NECK: Supple, No JVD  CHEST/LUNG: Clear to auscultation bilaterally; No wheeze  HEART: Regular rate and rhythm; No murmurs, rubs, or gallops  ABDOMEN: Soft, Nontender, Nondistended; Bowel sounds present  EXTREMITIES:  2+ Peripheral Pulses, No clubbing, cyanosis, or edema  PSYCH: AAOx3  NEUROLOGY: non-focal  SKIN: No rashes or lesions    LABS:	 	                                              13.1   6.3   )-----------( 138      ( 07 Sep 2017 05:29 )             40.3     09-07    134<L>  |  101  |  23  ----------------------------<  107<H>  4.4   |  20<L>  |  0.74    Ca    8.9      07 Sep 2017 05:29  Phos  3.9     09-07  Mg     1.9     09-07    TPro  6.3  /  Alb  3.2<L>  /  TBili  0.4  /  DBili  x   /  AST  35  /  ALT  54<H>  /  AlkPhos  87  09-07        < from: TTE with Doppler (w/Cont) (08.28.17 @ 22:35) >  Dimensions:    Normal Values:  LA:     4.2    2.0 - 4.0 cm  Ao:     3.3    2.0 - 3.8 cm  SEPTUM: 0.7    0.6 - 1.2 cm  PWT:    0.8    0.6 - 1.1 cm  LVIDd:  6.7    3.0 - 5.6 cm  LVIDs:  6.4    1.8 - 4.0 cm  Derived variables:  LVMI: 115 g/m2  RWT: 0.23  Fractional short: 4 %  EF (Visual Estimate): 10-15 %  ------------------------------------------------------------------------  Observations:  Mitral Valve: Thickened mitral valve. Dilated mitral valve  annulus. Tethered posterior leaflet.  Severe, eccentric,  posteriorly-directed mitral regurgitation. Flow reversal is  seen in the pulmonary veins.  Aortic Valve/Aorta: Thickened aortic valve.  Aortic Root: 3.3 cm.  Left Atrium: Severely dilatedleft atrium.  LA volume index  = 63 cc/m2.  Left Ventricle: Endocardial visualization enhanced with  intravenous injection of echo contrast (Definity). Severe  global left ventricular systolic dysfunction with regional  variation (the mid to distal anterior wall and septum are  akinetic). Spontaneous echocardiographic contrast is seen  in the left ventricle. Ill-defined mass at the apex of the  left ventricle. May be left ventricular trabeculation or  thrombus. Consider MRI to further evaluate. Moderate to  severe left ventricular enlargement. E/e' equals 20,  consistent with elevated left ventricular end diastolic  pressure.  Right Heart: Moderate right atrial enlargement. Right  ventricular enlargement with decreased right ventricular  systolic function.  Diastolic septal flattening consistent  with right ventricular volume overload.  Thickened  tricuspid valve. Moderate-severe tricuspid regurgitation.  Normal pulmonic valve. Mild pulmonic regurgitation.  Pericardium/Pleura: Trace pericardial effusion.  Bilateral pleural effusions.  ------------------------------------------------------------------------  Conclusions:  1. Thickened mitral valve. Dilated mitral valve annulus.  Tethered posterior leaflet.  Severe, eccentric,  posteriorly-directed mitral regurgitation. Flow reversal is  seen in the pulmonary veins.  2. Thickened aortic valve.  3. Severely dilated left atrium.  4. Moderate to severe left ventricular enlargement.  5. Endocardial visualization enhanced with intravenous  injection of echo contrast (Definity). Severe global left  ventricular systolic dysfunction with regional variation  (the mid to distal anterior wall and septum are akinetic).  Spontaneous echocardiographic contrast is seen in the left  ventricle. Ill-defined, mass at the apex of the left  ventricle. May be left ventricular trabeculation or  thrombus. Consider MRI to further evaluate.  6. E/e' equals 20, consistent with elevated left  ventricular end diastolic pressure.  7. Moderate right atrial enlargement.  8. Right ventricular enlargement with decreased right  ventricular systolic function.  Diastolic septal flattening  consistent with right ventricular volume overload.  9. Thickened tricuspid valve. Moderate-severe tricuspid  regurgitation.  10. Trace pericardial effusion.  ------------------------------------------------------------------------  Confirmed on  8/28/2017 - 15:54:57 by Matias Stubbs M.D.  ------------------------------------------------------------------------    < end of copied text >

## 2017-09-07 NOTE — PROGRESS NOTE ADULT - ASSESSMENT
Feeling better physically, less abdominal pain.  Sleeping intermittently due to CCU environment (staff activity, noise).   Appetite "ok."   Drinks 2 Ensure supplements/day.   Endorsed periods of low mood and frustration in setting of hospital routines including "different people asking the same things all day long."  Experiencing increased stress surrounding need for LVAD.  Reviewed stress management skills and strategies for being a good advocate for himself (requesting staff limit number of people asking same questions).  Discussed importance of utilizing his support system and allowing others to help.  Receptive to support and validation.      DX: Adjustment disorder with anxiety     Recommendations:  Will benefit from additional coping strategies to manage current stressors   Behavioral Cardiology will continue to follow

## 2017-09-07 NOTE — PROGRESS NOTE ADULT - SUBJECTIVE AND OBJECTIVE BOX
Patient is a 62y old  Male who presents with a chief complaint of SOOB (25 Aug 2017 22:27)    events noted  still has abdominal pain says his breathing is OK  But feels weak  no wheezing noted       Any change in ROS:     MEDICATIONS  (STANDING):  pantoprazole    Tablet 40 milliGRAM(s) Oral two times a day before meals  sucralfate suspension 1 Gram(s) Oral four times a day  atorvastatin 20 milliGRAM(s) Oral at bedtime  tiotropium 18 MICROgram(s) Capsule 1 Capsule(s) Inhalation daily  multivitamin 1 Tablet(s) Oral daily  buDESOnide 160 MICROgram(s)/formoterol 4.5 MICROgram(s) Inhaler 2 Puff(s) Inhalation two times a day  senna 2 Tablet(s) Oral at bedtime  docusate sodium 100 milliGRAM(s) Oral three times a day  polyethylene glycol 3350 17 Gram(s) Oral daily  milrinone Infusion 0.375 MICROgram(s)/kG/Min (6.75 mL/Hr) IV Continuous <Continuous>  hydrALAZINE 50 milliGRAM(s) Oral every 8 hours  heparin  Infusion.  Unit(s)/Hr (11 mL/Hr) IV Continuous <Continuous>  magnesium sulfate  IVPB 1 Gram(s) IV Intermittent once    MEDICATIONS  (PRN):  ondansetron Injectable 4 milliGRAM(s) IV Push every 6 hours PRN Nausea and/or Vomiting  aluminum hydroxide/magnesium hydroxide/simethicone Suspension 30 milliLiter(s) Oral every 4 hours PRN Dyspepsia  heparin  Injectable 4500 Unit(s) IV Push every 6 hours PRN For aPTT less than 40  heparin  Injectable 2000 Unit(s) IV Push every 6 hours PRN For aPTT between 40 - 57    Vital Signs Last 24 Hrs  T(C): 36.4 (07 Sep 2017 11:00), Max: 36.8 (06 Sep 2017 19:00)  T(F): 97.5 (07 Sep 2017 11:00), Max: 98.2 (06 Sep 2017 19:00)  HR: 108 (07 Sep 2017 12:00) (104 - 118)  BP: 106/78 (07 Sep 2017 12:00) (94/68 - 126/95)  BP(mean): 88 (07 Sep 2017 12:00) (65 - 108)  RR: 20 (07 Sep 2017 12:00) (17 - 26)  SpO2: 96% (07 Sep 2017 12:00) (93% - 99%)    I&O's Summary    06 Sep 2017 07:01  -  07 Sep 2017 07:00  --------------------------------------------------------  IN: 1812.2 mL / OUT: 4350 mL / NET: -2537.8 mL    07 Sep 2017 07:01  -  07 Sep 2017 12:47  --------------------------------------------------------  IN: 334 mL / OUT: 2075 mL / NET: -1741 mL          Physical Exam:   GENERAL: NAD, well-groomed, well-developed  HEENT: ALONSO/   Atraumatic, Normocephalic  ENMT: No tonsillar erythema, exudates, or enlargement; Moist mucous membranes, Good dentition, No lesions  NECK: Supple, No JVD, Normal thyroid  CHEST/LUNG: Clear to auscultaion,   CVS: Regular rate and rhythm; No murmurs, rubs, or gallops  GI: : abdominla pain but exam is benign  NERVOUS SYSTEM:  Alert & Oriented X3  EXTREMITIES:  2+ Peripheral Pulses, No clubbing, cyanosis, or edema  LYMPH: No lymphadenopathy noted  SKIN: No rashes or lesions  ENDOCRINOLOGY: No Thyromegaly  PSYCH: Appropriate    Labs:  ABG - ( 07 Sep 2017 05:24 )  pH: 7.44  /  pCO2: 38    /  pO2: 97    / HCO3: 25    / Base Excess: 1.5   /  SaO2: 98              21  CARDIAC MARKERS ( 07 Sep 2017 09:04 )  x     / 0.03 ng/mL / 207 U/L / x     / 2.9 ng/mL                            13.1   6.3   )-----------( 138      ( 07 Sep 2017 05:29 )             40.3                         13.3   6.9   )-----------( 135      ( 07 Sep 2017 00:14 )             39.8                         13.1   6.4   )-----------( 128      ( 06 Sep 2017 06:53 )             39.4                         13.5   7.3   )-----------( 135      ( 05 Sep 2017 23:40 )             41.1                         12.4   5.6   )-----------( 115      ( 05 Sep 2017 04:30 )             37.8                         12.9   5.8   )-----------( 106      ( 04 Sep 2017 04:30 )             39.1                         14.1   5.4   )-----------( 110      ( 03 Sep 2017 15:08 )             43.0     09-07    136  |  99  |  22  ----------------------------<  172<H>  4.4   |  24  |  0.82  09-07    134<L>  |  101  |  23  ----------------------------<  107<H>  4.4   |  20<L>  |  0.74  09-07    136  |  101  |  25<H>  ----------------------------<  142<H>  4.4   |  20<L>  |  0.75  09-06    134<L>  |  100  |  26<H>  ----------------------------<  114<H>  4.3   |  21<L>  |  0.83  09-05    133<L>  |  99  |  26<H>  ----------------------------<  183<H>  4.1   |  21<L>  |  0.79  09-05    135  |  101  |  25<H>  ----------------------------<  219<H>  4.1   |  23  |  0.82  09-04    137  |  103  |  18  ----------------------------<  104<H>  4.3   |  24  |  0.85    Ca    9.8      07 Sep 2017 11:36  Ca    8.9      07 Sep 2017 05:29  Ca    9.1      07 Sep 2017 00:14  Ca    8.9      06 Sep 2017 06:53  Ca    8.9      05 Sep 2017 23:40  Phos  4.1     09-07  Phos  3.9     09-07  Phos  3.2     09-07  Phos  2.9     09-06  Phos  2.2     09-05  Mg     1.9     09-07  Mg     1.9     09-07  Mg     1.8     09-07  Mg     1.9     09-06  Mg     1.8     09-05    TPro  7.0  /  Alb  3.5  /  TBili  0.4  /  DBili  x   /  AST  41<H>  /  ALT  57<H>  /  AlkPhos  104  09-07  TPro  6.3  /  Alb  3.2<L>  /  TBili  0.4  /  DBili  x   /  AST  35  /  ALT  54<H>  /  AlkPhos  87  09-07  TPro  6.5  /  Alb  3.3  /  TBili  0.4  /  DBili  x   /  AST  32  /  ALT  54<H>  /  AlkPhos  97  09-07  TPro  6.3  /  Alb  3.2<L>  /  TBili  0.4  /  DBili  x   /  AST  31  /  ALT  54<H>  /  AlkPhos  94  09-06  TPro  6.6  /  Alb  3.5  /  TBili  0.5  /  DBili  x   /  AST  32  /  ALT  60<H>  /  AlkPhos  105  09-05  TPro  5.9<L>  /  Alb  3.1<L>  /  TBili  0.4  /  DBili  x   /  AST  27  /  ALT  57<H>  /  AlkPhos  103  09-05    CAPILLARY BLOOD GLUCOSE          LIVER FUNCTIONS - ( 07 Sep 2017 11:36 )  Alb: 3.5 g/dL / Pro: 7.0 g/dL / ALK PHOS: 104 U/L / ALT: 57 U/L RC / AST: 41 U/L / GGT: x           PT/INR - ( 07 Sep 2017 05:29 )   PT: 12.0 sec;   INR: 1.10 ratio         PTT - ( 07 Sep 2017 11:36 )  PTT:96.6 sec    Cultures:     Studies  Chest X-RAY  CT SCAN Chest   Venous Dopplers: LE:   CT Abdomen  Others        < from: Xray Chest 1 View AP -PORTABLE-Routine (09.07.17 @ 08:56) >  PROCEDURE DATE:  09/07/2017            INTERPRETATION:  A single chest x-ray was obtained on September 7, 2017.    Indication: [place] catheter position.    Impression:    The heart is enlarged. Right pleural effusion. Left lower lobe pneumonia   and/or atelectasis. [place] catheter is seen on the right and the tip   is wedged in the right pulmonary artery. No pneumothorax.                    ANIRUDH HUMPHREYS M.D., ATTENDING RADIOLOGIST  This document has been electronically signed. Sep  7 2017  8:59AM        < end of copied text >    < from: Limited Transthoracic Echo (09.07.17 @ 11:04) >  ------------------------------------------------------------------------  Observations:  Mitral Valve: Tethered mitral valve leaflets with  restricted posterior leaflet. Moderate-severe, posteriorly  directed mitral regurgitation.  Aortic Valve/Aorta:  ------------------------------------------------------------------------  Conclusions:  Limited study to evaluate mitral valve.  1. Tethered mitral valve leaflets with restricted posterior  leaflet. Moderate-severe, posteriorly directed mitral  regurgitation.  *** Compared with echocardiogram of 8/28/2017, the mitral  regurgitation appears slightly improved.  ------------------------------------------------------------------------  Confirmed on  9/7/2017 - 12:18:36 by Grey Negrete M.D.  ------------------------------------------------------------------------    < end of copied text >

## 2017-09-07 NOTE — PROGRESS NOTE ADULT - SUBJECTIVE AND OBJECTIVE BOX
Behavioral Cardiology Progress Note     HPI: 62 year old man with NICM, HFrEF, (LVEF 10%), ACC/AHA stage D with NYHA class IV symptoms, p/w abdominal pain, elevated lactic acid. Initial RHC with depressed CI (0.6). Undergoing evaluation for LVAD.        Behavioral Health Assessment:    phone ID: [id number]    Mood:  "Lots of different people come in here asking the same questions."          Current stressors:   Hospitalization    Undergoing evaluation for LVAD implant     Support system/family support:   Good family support (sister and her family, niece, nephews, cousins, sister).      Coping strategies:    Limited; reports he tends to keep things to himself, reluctant to ask others for help.  Family willing to provide support as needed.      Understanding of medical illness and treatment plan:   Verbalized good understanding of reason for hospital admission and treatment plan. Good understanding of cardiac disease and reason for possible LVAD implant.  In process of LVAD education with LVAD coordinator.        MSE:   Seen sitting in chair.  A&Ox3. Well related with good eye contact. Thought process goal directed.  No abnormal thought content, denies s/i.  Mood: dysphoric.  Affect anxious.  Insight and judgment adequate.       Brief cognitive assessment completed as part of LVAD evaluation.  Performed within normal range with MMSE score 27/30.  Pt reports primary language is French Creole, also speaks English.   Education: high school in [employer].

## 2017-09-07 NOTE — PROGRESS NOTE ADULT - SUBJECTIVE AND OBJECTIVE BOX
INTERVAL HPI/OVERNIGHT EVENTS:        tolerating diet  + bm    MEDICATIONS  (STANDING):  heparin  Injectable 5000 Unit(s) SubCutaneous every 8 hours  pantoprazole    Tablet 40 milliGRAM(s) Oral two times a day before meals  sucralfate suspension 1 Gram(s) Oral four times a day  furosemide   Injectable 40 milliGRAM(s) IV Push two times a day    MEDICATIONS  (PRN):  ondansetron Injectable 4 milliGRAM(s) IV Push every 6 hours PRN Nausea and/or Vomiting      Allergies    No Known Allergies    Intolerances        ROS:   General:  No wt loss, fevers, chills, night sweats, fatigue,   Eyes:  Good vision, no reported pain  ENT:  No sore throat, pain, runny nose, dysphagia  CV:  No pain, palpitations, hypo/hypertension  Resp:  No dyspnea, cough, tachypnea, wheezing  GI:  No pain, No nausea, No vomiting, No diarrhea, No constipation, No weight loss, No fever, No pruritis, No rectal bleeding, No tarry stools, No dysphagia,  :  No pain, bleeding, incontinence, nocturia  Muscle:  No pain, weakness  Neuro:  No weakness, tingling, memory problems  Psych:  No fatigue, insomnia, mood problems, depression  Endocrine:  No polyuria, polydipsia, cold/heat intolerance  Heme:  No petechiae, ecchymosis, easy bruisability  Skin:  No rash, tattoos, scars, edema      PHYSICAL EXAM:   Vital Signs:  Vital Signs Last 24 Hrs  T(C): 36.6 (28 Aug 2017 12:05), Max: 36.6 (27 Aug 2017 21:17)  T(F): 97.8 (28 Aug 2017 12:05), Max: 97.8 (27 Aug 2017 21:17)  HR: 104 (28 Aug 2017 12:05) (97 - 104)  BP: 109/82 (28 Aug 2017 12:05) (97/67 - 109/82)  BP(mean): --  RR: 18 (28 Aug 2017 12:05) (18 - 18)  SpO2: 93% (28 Aug 2017 12:05) (93% - 97%)  Daily     Daily Weight in k.5 (28 Aug 2017 04:17)    GENERAL:  Appears stated age, well-groomed, well-nourished, no distress  HEENT:  NC/AT,  conjunctivae clear and pink, no thyromegaly, nodules, adenopathy, no JVD, sclera -anicteric  CHEST:  Full & symmetric excursion, no increased effort, breath sounds clear  HEART:  Regular rhythm, S1, S2, no murmur/rub/S3/S4, no abdominal bruit, no edema  ABDOMEN:  Soft, non-tender, non-distended, normoactive bowel sounds,  no masses ,no hepato-splenomegaly, no signs of chronic liver disease  EXTEREMITIES:  no cyanosis,clubbing or edema  SKIN:  No rash/erythema/ecchymoses/petechiae/wounds/abscess/warm/dry  NEURO:  Alert, oriented, no asterixis, no tremor, no encephalopathy      LABS:                        14.6   4.8   )-----------( 149      ( 27 Aug 2017 09:18 )             44.7             Urinalysis Basic - ( 27 Aug 2017 18:36 )    Color: Yellow / Appearance: Clear / SG: >1.030 / pH: x  Gluc: x / Ketone: Trace  / Bili: Negative / Urobili: Negative   Blood: x / Protein: 30 mg/dL / Nitrite: Negative   Leuk Esterase: Negative / RBC: 2-5 /HPF / WBC 0-2 /HPF   Sq Epi: x / Non Sq Epi: x / Bacteria: x        RADIOLOGY & ADDITIONAL TESTS:

## 2017-09-07 NOTE — PROGRESS NOTE ADULT - SUBJECTIVE AND OBJECTIVE BOX
Patient is a 62y old  Male who presents with a chief complaint of SOOB (25 Aug 2017 22:27)      SUBJECTIVE / OVERNIGHT EVENTS:  Seen in CCU on 9/7/17 @ approx 10:00 AM  sitting in bed, no SOB at rest.  Review of Systems:   CONSTITUTIONAL: No fever, weight loss, or fatigue  EYES: No eye pain, visual disturbances, or discharge  ENMT:  No difficulty hearing, tinnitus, vertigo; No sinus or throat pain  NECK: No pain or stiffness  BREASTS: No pain, masses, or nipple discharge  RESPIRATORY: No cough, wheezing, chills or hemoptysis;  CARDIOVASCULAR: No chest pain, palpitations, dizziness, or leg swelling  GASTROINTESTINAL: No abdominal or epigastric pain. No nausea, vomiting, or hematemesis; No diarrhea or constipation. No melena or hematochezia.  GENITOURINARY: No dysuria, frequency, hematuria, or incontinence  NEUROLOGICAL: No headaches, memory loss, loss of strength, numbness, or tremors  SKIN: No itching, burning, rashes, or lesions   LYMPH NODES: No enlarged glands  ENDOCRINE: No heat or cold intolerance; No hair loss  MUSCULOSKELETAL: No joint pain or swelling; No muscle, back, or extremity pain  PSYCHIATRIC: No depression, anxiety, mood swings, or difficulty sleeping  HEME/LYMPH: No easy bruising, or bleeding gums  ALLERY AND IMMUNOLOGIC: No hives or eczema    MEDICATIONS  (STANDING):  pantoprazole    Tablet 40 milliGRAM(s) Oral two times a day before meals  sucralfate suspension 1 Gram(s) Oral four times a day  atorvastatin 20 milliGRAM(s) Oral at bedtime  tiotropium 18 MICROgram(s) Capsule 1 Capsule(s) Inhalation daily  multivitamin 1 Tablet(s) Oral daily  buDESOnide 160 MICROgram(s)/formoterol 4.5 MICROgram(s) Inhaler 2 Puff(s) Inhalation two times a day  senna 2 Tablet(s) Oral at bedtime  docusate sodium 100 milliGRAM(s) Oral three times a day  polyethylene glycol 3350 17 Gram(s) Oral daily  milrinone Infusion 0.375 MICROgram(s)/kG/Min (6.75 mL/Hr) IV Continuous <Continuous>  hydrALAZINE 50 milliGRAM(s) Oral every 8 hours  heparin  Infusion.  Unit(s)/Hr (11 mL/Hr) IV Continuous <Continuous>    MEDICATIONS  (PRN):  ondansetron Injectable 4 milliGRAM(s) IV Push every 6 hours PRN Nausea and/or Vomiting  aluminum hydroxide/magnesium hydroxide/simethicone Suspension 30 milliLiter(s) Oral every 4 hours PRN Dyspepsia  heparin  Injectable 4500 Unit(s) IV Push every 6 hours PRN For aPTT less than 40  heparin  Injectable 2000 Unit(s) IV Push every 6 hours PRN For aPTT between 40 - 57      PHYSICAL EXAM:  Vital Signs Last 24 Hrs  T(C): 36.6 (08 Sep 2017 07:30), Max: 36.7 (07 Sep 2017 14:00)  T(F): 97.9 (08 Sep 2017 07:30), Max: 98.1 (07 Sep 2017 14:00)  HR: 92 (08 Sep 2017 09:00) (92 - 122)  BP: 88/73 (08 Sep 2017 09:00) (77/59 - 116/89)  BP(mean): 80 (08 Sep 2017 09:00) (64 - 102)  RR: 18 (08 Sep 2017 09:00) (16 - 60)  SpO2: 98% (08 Sep 2017 09:00) (94% - 99%)  I&O's Summary    07 Sep 2017 07:01  -  08 Sep 2017 07:00  --------------------------------------------------------  IN: 1255.2 mL / OUT: 3675 mL / NET: -2419.8 mL    08 Sep 2017 07:01  -  08 Sep 2017 09:32  --------------------------------------------------------  IN: 33.6 mL / OUT: 0 mL / NET: 33.6 mL      GENERAL: NAD, well-developed  HEAD:  Atraumatic, Normocephalic  EYES: EOMI, PERRLA, conjunctiva and sclera clear  NECK: Supple, No JVD  CHEST/LUNG: Clear to auscultation bilaterally; No wheeze  HEART: Regular rate and rhythm; No murmurs, rubs, or gallops  ABDOMEN: Soft, Nontender, Nondistended; Bowel sounds present  EXTREMITIES:  2+ Peripheral Pulses, No clubbing, cyanosis, or edema  PSYCH: AAOx3  NEUROLOGY: non-focal  SKIN: No rashes or lesions    LABS:  CAPILLARY BLOOD GLUCOSE                              13.2   6.2   )-----------( 140      ( 08 Sep 2017 04:38 )             39.2     09-08    136  |  102  |  21  ----------------------------<  136<H>  4.3   |  22  |  0.81    Ca    9.3      08 Sep 2017 04:38  Phos  3.3     09-08  Mg     2.1     09-08    TPro  6.6  /  Alb  3.3  /  TBili  0.3  /  DBili  x   /  AST  36  /  ALT  54<H>  /  AlkPhos  94  09-08    PT/INR - ( 08 Sep 2017 04:38 )   PT: 11.9 sec;   INR: 1.10 ratio         PTT - ( 08 Sep 2017 04:38 )  PTT:136.4 sec  CARDIAC MARKERS ( 07 Sep 2017 09:04 )  x     / 0.03 ng/mL / 207 U/L / x     / 2.9 ng/mL          RADIOLOGY & ADDITIONAL TESTS:    Imaging Personally Reviewed:    Consultant(s) Notes Reviewed:      Care Discussed with Consultants/Other Providers:

## 2017-09-07 NOTE — CHART NOTE - NSCHARTNOTEFT_GEN_A_CORE
====================  CCU MIDNIGHT ROUNDS  ====================    RICKY JOINT  [id number]  Patient is a 62y old  Male who presents with a chief complaint of SOOB (25 Aug 2017 22:27)      ====================  SUMMARY:  ====================    Patient is a 62 years old Creole speaking Male with no no significant PMH who presented 6 days ago with SOB and chest pain. Patient states that he had no hx of heart failure or MI. He was vacationing in [place] for the past month and his symptoms began a week prior to admission. He endorses chest pain which was substernal, non radiating and worse with exertion along with non productive cough and PND. No orthopnea or weight changes. He also endorses anorexia and weight loss. No recent sickness per se and no known sick contacts. He sought medical attention in [place] but refused to be admitted to the hospital there. [place] the [place] and came to [facility] ED. Patient found to be tachycardiac and hypotensive.     During his hospital stay, patient had had a negative CTA but a newly diagnosed systolic CHF with EF 10-15% and severely dilated LA, Severe MR, TR. RHC showed PA systolic pressure of 54, LVEDP 32, CO 1.15, CI 0.63 and EF 10%. Admitted to CCU--started on afterload reduction--nitrprusside switched to Milrinone with improvement in cardiac indices. LVAD evaluation in process     ====================  NEW EVENTS:  ====================    TTE significant for MR, slightly improved from previous. LVAD work up under way. GI consulted regarding colonoscopy. CO: CI:    No events ON. Patient denies any CP SOB ERICK orthopnea, f nvd.    ====================  VITALS (Last 12 hrs):  ====================    T(C): 36.6 (09-07-17 @ 19:00), Max: 36.7 (09-07-17 @ 14:00)  T(F): 97.9 (09-07-17 @ 19:00), Max: 98.1 (09-07-17 @ 14:00)  HR: 118 (09-07-17 @ 22:00) (108 - 122)  BP: 112/85 (09-07-17 @ 22:00) (92/72 - 116/89)  BP(mean): 94 (09-07-17 @ 22:00) (75 - 102)  ABP: --  ABP(mean): --  RR: 30 (09-07-17 @ 22:00) (16 - 34)  SpO2: 97% (09-07-17 @ 22:00) (94% - 99%)  Wt(kg): --  CVP(mm Hg): 3 (09-07-17 @ 14:00) (3 - 3)  CVP(cm H2O): --  CO: --  CI: --  PA: 45/22 (09-07-17 @ 14:00) (32/13 - 45/22)  PA(mean): 32 (09-07-17 @ 14:00) (22 - 32)  PCWP: --  SVR: --  PVR: --    I&O's Summary    06 Sep 2017 07:01  -  07 Sep 2017 07:00  --------------------------------------------------------  IN: 1812.2 mL / OUT: 4350 mL / NET: -2537.8 mL    07 Sep 2017 07:01  -  07 Sep 2017 22:47  --------------------------------------------------------  IN: 666.8 mL / OUT: 2375 mL / NET: -1708.2 mL      ====================  NEW LABS:  ====================                          13.1   6.3   )-----------( 138      ( 07 Sep 2017 05:29 )             40.3     09-07    136  |  99  |  22  ----------------------------<  172<H>  4.4   |  24  |  0.82    Ca    9.8      07 Sep 2017 11:36  Phos  4.1     09-07  Mg     1.9     09-07    TPro  7.0  /  Alb  3.5  /  TBili  0.4  /  DBili  x   /  AST  41<H>  /  ALT  57<H>  /  AlkPhos  104  09-07    PT/INR - ( 07 Sep 2017 05:29 )   PT: 12.0 sec;   INR: 1.10 ratio         PTT - ( 07 Sep 2017 11:36 )  PTT:96.6 sec  Creatine Kinase, Serum: 207 U/L <H> (09-07-17 @ 09:04)  Troponin T, Serum: 0.03 ng/mL (09-07-17 @ 09:04)    CKMB Units: 2.9 ng/mL (09-07 @ 09:04)    ABG - ( 07 Sep 2017 05:24 )  pH: 7.44  /  pCO2: 38    /  pO2: 97    / HCO3: 25    / Base Excess: 1.5   /  SaO2: 98          ====================  PLAN:  ====================  - Continue Hydralazine for afterload reduction  - Continue Milrinone gtt  - Monitor I & O's and electrolytes; replete as needed  - LVAD w/u per HF  - OOB to chair as tolerated    Dion Hernandez MD  PGY2 Internal Medicine Resident  Pager: [phone] - [place]  [phone] - [facility] ====================  CCU MIDNIGHT ROUNDS  ====================    RICKY JOINT  [id number]  Patient is a 62y old  Male who presents with a chief complaint of SOOB (25 Aug 2017 22:27)      ====================  SUMMARY:  ====================    Patient is a 62 years old Creole speaking Male with no no significant PMH who presented 6 days ago with SOB and chest pain. Patient states that he had no hx of heart failure or MI. He was vacationing in [facility] for the past month and his symptoms began a week prior to admission. He endorses chest pain which was substernal, non radiating and worse with exertion along with non productive cough and PND. No orthopnea or weight changes. He also endorses anorexia and weight loss. No recent sickness per se and no known sick contacts. He sought medical attention in [facility] but refused to be admitted to the hospital there. [place] the [place] and came to [facility]. Patient found to be tachycardiac and hypotensive.     During his hospital stay, patient had had a negative CTA but a newly diagnosed systolic CHF with EF 10-15% and severely dilated LA, Severe MR, TR. RHC showed PA systolic pressure of 54, LVEDP 32, CO 1.15, CI 0.63 and EF 10%. Admitted to CCU--started on afterload reduction--nitrprusside switched to Milrinone with improvement in cardiac indices. LVAD evaluation in process     ====================  NEW EVENTS:  ====================    TTE significant for MR, slightly improved from previous. LVAD work up under way. GI consulted regarding colonoscopy.    No events ON. Patient denies any CP SOB ERICK orthopnea, f nvd.    ====================  VITALS (Last 12 hrs):  ====================    T(C): 36.6 (09-07-17 @ 19:00), Max: 36.7 (09-07-17 @ 14:00)  T(F): 97.9 (09-07-17 @ 19:00), Max: 98.1 (09-07-17 @ 14:00)  HR: 118 (09-07-17 @ 22:00) (108 - 122)  BP: 112/85 (09-07-17 @ 22:00) (92/72 - 116/89)  BP(mean): 94 (09-07-17 @ 22:00) (75 - 102)  ABP: --  ABP(mean): --  RR: 30 (09-07-17 @ 22:00) (16 - 34)  SpO2: 97% (09-07-17 @ 22:00) (94% - 99%)  Wt(kg): --  CVP(mm Hg): 3 (09-07-17 @ 14:00) (3 - 3)  CVP(cm H2O): --  CO: --  CI: --  PA: 45/22 (09-07-17 @ 14:00) (32/13 - 45/22)  PA(mean): 32 (09-07-17 @ 14:00) (22 - 32)  PCWP: --  SVR: --  PVR: --    I&O's Summary    06 Sep 2017 07:01  -  07 Sep 2017 07:00  --------------------------------------------------------  IN: 1812.2 mL / OUT: 4350 mL / NET: -2537.8 mL    07 Sep 2017 07:01  -  07 Sep 2017 22:47  --------------------------------------------------------  IN: 666.8 mL / OUT: 2375 mL / NET: -1708.2 mL      ====================  NEW LABS:  ====================                          13.1   6.3   )-----------( 138      ( 07 Sep 2017 05:29 )             40.3     09-07    136  |  99  |  22  ----------------------------<  172<H>  4.4   |  24  |  0.82    Ca    9.8      07 Sep 2017 11:36  Phos  4.1     09-07  Mg     1.9     09-07    TPro  7.0  /  Alb  3.5  /  TBili  0.4  /  DBili  x   /  AST  41<H>  /  ALT  57<H>  /  AlkPhos  104  09-07    PT/INR - ( 07 Sep 2017 05:29 )   PT: 12.0 sec;   INR: 1.10 ratio         PTT - ( 07 Sep 2017 11:36 )  PTT:96.6 sec  Creatine Kinase, Serum: 207 U/L <H> (09-07-17 @ 09:04)  Troponin T, Serum: 0.03 ng/mL (09-07-17 @ 09:04)    CKMB Units: 2.9 ng/mL (09-07 @ 09:04)    ABG - ( 07 Sep 2017 05:24 )  pH: 7.44  /  pCO2: 38    /  pO2: 97    / HCO3: 25    / Base Excess: 1.5   /  SaO2: 98          ====================  PLAN:  ====================  - Continue Hydralazine for afterload reduction  - Continue Milrinone gtt  - Monitor I & O's and electrolytes; replete as needed  - LVAD w/u per HF  - OOB to chair as tolerated    Dion Hernandez MD  PGY2 Internal Medicine Resident  Pager: [phone] - [place] [phone] - [facility]

## 2017-09-07 NOTE — PROGRESS NOTE ADULT - SUBJECTIVE AND OBJECTIVE BOX
24H hour events: No acute events in last 24 hours. Patient seen in bed, states feels well, improved since admission.     MEDICATIONS:  nitroprusside Infusion 0.7 MICROgram(s)/kG/Min IV Continuous <Continuous>  milrinone Infusion 0.375 MICROgram(s)/kG/Min IV Continuous <Continuous>  furosemide   Injectable 40 milliGRAM(s) IV Push daily  hydrALAZINE 50 milliGRAM(s) Oral every 8 hours  heparin  Infusion.  Unit(s)/Hr IV Continuous <Continuous>  heparin  Injectable 4500 Unit(s) IV Push every 6 hours PRN  heparin  Injectable 2000 Unit(s) IV Push every 6 hours PRN      tiotropium 18 MICROgram(s) Capsule 1 Capsule(s) Inhalation daily  buDESOnide 160 MICROgram(s)/formoterol 4.5 MICROgram(s) Inhaler 2 Puff(s) Inhalation two times a day    ondansetron Injectable 4 milliGRAM(s) IV Push every 6 hours PRN    pantoprazole    Tablet 40 milliGRAM(s) Oral two times a day before meals  sucralfate suspension 1 Gram(s) Oral four times a day  aluminum hydroxide/magnesium hydroxide/simethicone Suspension 30 milliLiter(s) Oral every 4 hours PRN  senna 2 Tablet(s) Oral at bedtime  docusate sodium 100 milliGRAM(s) Oral three times a day  polyethylene glycol 3350 17 Gram(s) Oral daily    atorvastatin 20 milliGRAM(s) Oral at bedtime    multivitamin 1 Tablet(s) Oral daily        PHYSICAL EXAM:  T(C): 36.7 (09-07-17 @ 07:00), Max: 36.8 (09-06-17 @ 19:00)  HR: 106 (09-07-17 @ 07:00) (104 - 118)  BP: 100/76 (09-07-17 @ 07:00) (100/76 - 111/92)  RR: 22 (09-07-17 @ 07:00) (17 - 29)  SpO2: 97% (09-07-17 @ 07:00) (93% - 99%)  Wt(kg): --  I&O's Summary    06 Sep 2017 07:01  -  07 Sep 2017 07:00  --------------------------------------------------------  IN: 1812.2 mL / OUT: 4350 mL / NET: -2537.8 mL        Appearance: Normal	  HEENT:   Normal oral mucosa  Lymphatic: No lymphadenopathy  Cardiovascular: Normal S1 S2,         No JVD,      No murmurs,      No edema  Respiratory: Lungs clear to auscultation	  Psychiatry: Mood & affect appropriate  Gastrointestinal:  Soft, Non-tender	  Skin: No rashes, No ecchymoses, No cyanosis	  Neurologic: Non-focal  Extremities: Normal range of motion, No clubbing, cyanosis   Vascular: warm extremities        LABS:	 	    CBC Full  -  ( 07 Sep 2017 05:29 )  WBC Count : 6.3 K/uL  Hemoglobin : 13.1 g/dL  Hematocrit : 40.3 %  Platelet Count - Automated : 138 K/uL  Mean Cell Volume : 99.5 fl  Mean Cell Hemoglobin : 32.4 pg  Mean Cell Hemoglobin Concentration : 32.5 gm/dL  Auto Neutrophil # : 3.6 K/uL  Auto Lymphocyte # : 1.6 K/uL  Auto Monocyte # : 0.7 K/uL  Auto Eosinophil # : 0.4 K/uL  Auto Basophil # : 0.1 K/uL  Auto Neutrophil % : 56.3 %  Auto Lymphocyte % : 24.9 %  Auto Monocyte % : 11.1 %  Auto Eosinophil % : 6.6 %  Auto Basophil % : 1.0 %    09-07    134<L>  |  101  |  23  ----------------------------<  107<H>  4.4   |  20<L>  |  0.74  09-07    136  |  101  |  25<H>  ----------------------------<  142<H>  4.4   |  20<L>  |  0.75    Ca    8.9      07 Sep 2017 05:29  Ca    9.1      07 Sep 2017 00:14  Phos  3.9     09-07  Phos  3.2     09-07  Mg     1.9     09-07  Mg     1.8     09-07    TPro  6.3  /  Alb  3.2<L>  /  TBili  0.4  /  DBili  x   /  AST  35  /  ALT  54<H>  /  AlkPhos  87  09-07  TPro  6.5  /  Alb  3.3  /  TBili  0.4  /  DBili  x   /  AST  32  /  ALT  54<H>  /  AlkPhos  97  09-07      proBNP:     TELEMETRY: 	  SR 80-90

## 2017-09-07 NOTE — PROGRESS NOTE ADULT - ATTENDING COMMENTS
No events  Milrinone 0.375, HDZN 50 Q, Lasix 40 for CVP> 10, Heparin  I/O: -2500   / 139 to 129  CVP 4, PA 45/14 30 PCWP 19 PA sat 58  134<L>  |  101  |  23  ----------------------------<  107<H>  4.4   |  20<L>  |  0.74 No events  Milrinone 0.375, HDZN 50 Q, Lasix 40 for CVP> 10, Heparin  I/O: -2500   / 139 to 129  exam: well compensated  CVP 4, PA 45/14 30 PCWP 19 PA sat 58  134<L>  |  101  |  23  ----------------------------<  107<H>  4.4   |  20<L>  |  0.74  Patient stable awaiting HMII LVAD (possible BTR)  Appreciate SW consult  Would D/c Travis Werner

## 2017-09-08 DIAGNOSIS — I82.90 ACUTE EMBOLISM AND THROMBOSIS OF UNSPECIFIED VEIN: ICD-10-CM

## 2017-09-08 LAB
ALBUMIN SERPL ELPH-MCNC: 3.3 G/DL — SIGNIFICANT CHANGE UP (ref 3.3–5)
ALP SERPL-CCNC: 94 U/L — SIGNIFICANT CHANGE UP (ref 40–120)
ALT FLD-CCNC: 54 U/L RC — HIGH (ref 10–45)
ANION GAP SERPL CALC-SCNC: 12 MMOL/L — SIGNIFICANT CHANGE UP (ref 5–17)
APTT BLD: 102.2 SEC — HIGH (ref 27.5–37.4)
APTT BLD: 136.4 SEC — CRITICAL HIGH (ref 27.5–37.4)
APTT BLD: 71.4 SEC — HIGH (ref 27.5–37.4)
AST SERPL-CCNC: 36 U/L — SIGNIFICANT CHANGE UP (ref 10–40)
BASOPHILS # BLD AUTO: 0.1 K/UL — SIGNIFICANT CHANGE UP (ref 0–0.2)
BASOPHILS NFR BLD AUTO: 1.4 % — SIGNIFICANT CHANGE UP (ref 0–2)
BILIRUB SERPL-MCNC: 0.3 MG/DL — SIGNIFICANT CHANGE UP (ref 0.2–1.2)
BUN SERPL-MCNC: 21 MG/DL — SIGNIFICANT CHANGE UP (ref 7–23)
CALCIUM SERPL-MCNC: 9.3 MG/DL — SIGNIFICANT CHANGE UP (ref 8.4–10.5)
CHLORIDE SERPL-SCNC: 102 MMOL/L — SIGNIFICANT CHANGE UP (ref 96–108)
CO2 SERPL-SCNC: 22 MMOL/L — SIGNIFICANT CHANGE UP (ref 22–31)
CREAT SERPL-MCNC: 0.81 MG/DL — SIGNIFICANT CHANGE UP (ref 0.5–1.3)
EOSINOPHIL # BLD AUTO: 0.5 K/UL — SIGNIFICANT CHANGE UP (ref 0–0.5)
EOSINOPHIL NFR BLD AUTO: 7.5 % — HIGH (ref 0–6)
GLUCOSE SERPL-MCNC: 136 MG/DL — HIGH (ref 70–99)
HCT VFR BLD CALC: 39.2 % — SIGNIFICANT CHANGE UP (ref 39–50)
HGB BLD-MCNC: 13.2 G/DL — SIGNIFICANT CHANGE UP (ref 13–17)
INR BLD: 1.1 RATIO — SIGNIFICANT CHANGE UP (ref 0.88–1.16)
LYMPHOCYTES # BLD AUTO: 1.9 K/UL — SIGNIFICANT CHANGE UP (ref 1–3.3)
LYMPHOCYTES # BLD AUTO: 30.5 % — SIGNIFICANT CHANGE UP (ref 13–44)
M TB TUBERC IFN-G BLD QL: 0.04 IU/ML — SIGNIFICANT CHANGE UP
M TB TUBERC IFN-G BLD QL: 0.04 IU/ML — SIGNIFICANT CHANGE UP
M TB TUBERC IFN-G BLD QL: NEGATIVE — SIGNIFICANT CHANGE UP
MAGNESIUM SERPL-MCNC: 2.1 MG/DL — SIGNIFICANT CHANGE UP (ref 1.6–2.6)
MCHC RBC-ENTMCNC: 33.5 PG — SIGNIFICANT CHANGE UP (ref 27–34)
MCHC RBC-ENTMCNC: 33.8 GM/DL — SIGNIFICANT CHANGE UP (ref 32–36)
MCV RBC AUTO: 99.2 FL — SIGNIFICANT CHANGE UP (ref 80–100)
MITOGEN IGNF BCKGRD COR BLD-ACNC: 9.27 IU/ML — SIGNIFICANT CHANGE UP
MONOCYTES # BLD AUTO: 0.6 K/UL — SIGNIFICANT CHANGE UP (ref 0–0.9)
MONOCYTES NFR BLD AUTO: 9.2 % — SIGNIFICANT CHANGE UP (ref 2–14)
NEUTROPHILS # BLD AUTO: 3.2 K/UL — SIGNIFICANT CHANGE UP (ref 1.8–7.4)
NEUTROPHILS NFR BLD AUTO: 51.4 % — SIGNIFICANT CHANGE UP (ref 43–77)
PHOSPHATE SERPL-MCNC: 3.3 MG/DL — SIGNIFICANT CHANGE UP (ref 2.5–4.5)
PLATELET # BLD AUTO: 140 K/UL — LOW (ref 150–400)
POTASSIUM SERPL-MCNC: 4.3 MMOL/L — SIGNIFICANT CHANGE UP (ref 3.5–5.3)
POTASSIUM SERPL-SCNC: 4.3 MMOL/L — SIGNIFICANT CHANGE UP (ref 3.5–5.3)
PROT SERPL-MCNC: 6.6 G/DL — SIGNIFICANT CHANGE UP (ref 6–8.3)
PROTHROM AB SERPL-ACNC: 11.9 SEC — SIGNIFICANT CHANGE UP (ref 9.8–12.7)
RBC # BLD: 3.95 M/UL — LOW (ref 4.2–5.8)
RBC # FLD: 14.3 % — SIGNIFICANT CHANGE UP (ref 10.3–14.5)
SODIUM SERPL-SCNC: 136 MMOL/L — SIGNIFICANT CHANGE UP (ref 135–145)
WBC # BLD: 6.2 K/UL — SIGNIFICANT CHANGE UP (ref 3.8–10.5)
WBC # FLD AUTO: 6.2 K/UL — SIGNIFICANT CHANGE UP (ref 3.8–10.5)

## 2017-09-08 PROCEDURE — 99233 SBSQ HOSP IP/OBS HIGH 50: CPT

## 2017-09-08 PROCEDURE — 71010: CPT | Mod: 26

## 2017-09-08 PROCEDURE — 99232 SBSQ HOSP IP/OBS MODERATE 35: CPT

## 2017-09-08 RX ORDER — ACETAMINOPHEN 500 MG
650 TABLET ORAL ONCE
Qty: 0 | Refills: 0 | Status: COMPLETED | OUTPATIENT
Start: 2017-09-08 | End: 2017-09-08

## 2017-09-08 RX ADMIN — Medication 100 MILLIGRAM(S): at 05:45

## 2017-09-08 RX ADMIN — HEPARIN SODIUM 0 UNIT(S)/HR: 5000 INJECTION INTRAVENOUS; SUBCUTANEOUS at 05:33

## 2017-09-08 RX ADMIN — Medication 50 MILLIGRAM(S): at 21:13

## 2017-09-08 RX ADMIN — POLYETHYLENE GLYCOL 3350 17 GRAM(S): 17 POWDER, FOR SOLUTION ORAL at 10:57

## 2017-09-08 RX ADMIN — Medication 1 GRAM(S): at 01:00

## 2017-09-08 RX ADMIN — Medication 50 MILLIGRAM(S): at 13:44

## 2017-09-08 RX ADMIN — Medication 650 MILLIGRAM(S): at 00:59

## 2017-09-08 RX ADMIN — Medication 1 GRAM(S): at 10:56

## 2017-09-08 RX ADMIN — HEPARIN SODIUM 900 UNIT(S)/HR: 5000 INJECTION INTRAVENOUS; SUBCUTANEOUS at 13:32

## 2017-09-08 RX ADMIN — TIOTROPIUM BROMIDE 1 CAPSULE(S): 18 CAPSULE ORAL; RESPIRATORY (INHALATION) at 17:27

## 2017-09-08 RX ADMIN — Medication 1 TABLET(S): at 10:57

## 2017-09-08 RX ADMIN — ATORVASTATIN CALCIUM 20 MILLIGRAM(S): 80 TABLET, FILM COATED ORAL at 21:14

## 2017-09-08 RX ADMIN — Medication 50 MILLIGRAM(S): at 05:45

## 2017-09-08 RX ADMIN — HEPARIN SODIUM 900 UNIT(S)/HR: 5000 INJECTION INTRAVENOUS; SUBCUTANEOUS at 20:22

## 2017-09-08 RX ADMIN — BUDESONIDE AND FORMOTEROL FUMARATE DIHYDRATE 2 PUFF(S): 160; 4.5 AEROSOL RESPIRATORY (INHALATION) at 09:45

## 2017-09-08 RX ADMIN — BUDESONIDE AND FORMOTEROL FUMARATE DIHYDRATE 2 PUFF(S): 160; 4.5 AEROSOL RESPIRATORY (INHALATION) at 20:34

## 2017-09-08 RX ADMIN — Medication 1 GRAM(S): at 17:02

## 2017-09-08 RX ADMIN — PANTOPRAZOLE SODIUM 40 MILLIGRAM(S): 20 TABLET, DELAYED RELEASE ORAL at 17:02

## 2017-09-08 RX ADMIN — MILRINONE LACTATE 6.75 MICROGRAM(S)/KG/MIN: 1 INJECTION, SOLUTION INTRAVENOUS at 13:30

## 2017-09-08 RX ADMIN — Medication 1 GRAM(S): at 05:45

## 2017-09-08 RX ADMIN — Medication 650 MILLIGRAM(S): at 01:00

## 2017-09-08 RX ADMIN — HEPARIN SODIUM 1000 UNIT(S)/HR: 5000 INJECTION INTRAVENOUS; SUBCUTANEOUS at 06:32

## 2017-09-08 RX ADMIN — PANTOPRAZOLE SODIUM 40 MILLIGRAM(S): 20 TABLET, DELAYED RELEASE ORAL at 06:33

## 2017-09-08 RX ADMIN — MILRINONE LACTATE 6.75 MICROGRAM(S)/KG/MIN: 1 INJECTION, SOLUTION INTRAVENOUS at 23:00

## 2017-09-08 NOTE — PROGRESS NOTE ADULT - ASSESSMENT
61 yo man originally from [facility], without a reported history of any infections requiring hospitalizations. Patient was feeling at his baseline until a day after arriving in [facility] on 8/7 to [place].  Denies fevers or chills or acute intercurrent illnesses.    I see no contra-indications to a possible LVAD from an ID perspective    The screening tests for  Quantiferon Gold is negative  RPR is negative  RVP is negative    ID service available 9/9-10  [phone]

## 2017-09-08 NOTE — PROGRESS NOTE ADULT - SUBJECTIVE AND OBJECTIVE BOX
24H hour events: No acute events in last 24 hours. Patient seen in bed, states feels well, improved since admission.     MEDICATIONS:  milrinone Infusion 0.375 MICROgram(s)/kG/Min IV Continuous <Continuous>  hydrALAZINE 50 milliGRAM(s) Oral every 8 hours  heparin  Infusion.  Unit(s)/Hr IV Continuous <Continuous>  heparin  Injectable 4500 Unit(s) IV Push every 6 hours PRN  heparin  Injectable 2000 Unit(s) IV Push every 6 hours PRN      tiotropium 18 MICROgram(s) Capsule 1 Capsule(s) Inhalation daily  buDESOnide 160 MICROgram(s)/formoterol 4.5 MICROgram(s) Inhaler 2 Puff(s) Inhalation two times a day    ondansetron Injectable 4 milliGRAM(s) IV Push every 6 hours PRN    pantoprazole    Tablet 40 milliGRAM(s) Oral two times a day before meals  sucralfate suspension 1 Gram(s) Oral four times a day  aluminum hydroxide/magnesium hydroxide/simethicone Suspension 30 milliLiter(s) Oral every 4 hours PRN  senna 2 Tablet(s) Oral at bedtime  docusate sodium 100 milliGRAM(s) Oral three times a day  polyethylene glycol 3350 17 Gram(s) Oral daily    atorvastatin 20 milliGRAM(s) Oral at bedtime    multivitamin 1 Tablet(s) Oral daily        PHYSICAL EXAM:  T(C): 36.6 (09-08-17 @ 07:30), Max: 36.7 (09-07-17 @ 14:00)  HR: 104 (09-08-17 @ 08:00) (96 - 122)  BP: 95/82 (09-08-17 @ 08:00) (77/59 - 116/89)  RR: 20 (09-08-17 @ 08:00) (16 - 60)  SpO2: 97% (09-08-17 @ 08:00) (94% - 99%)  Wt(kg): --  I&O's Summary    07 Sep 2017 07:01  -  08 Sep 2017 07:00  --------------------------------------------------------  IN: 1255.2 mL / OUT: 3675 mL / NET: -2419.8 mL    08 Sep 2017 07:01  -  08 Sep 2017 09:06  --------------------------------------------------------  IN: 16.8 mL / OUT: 0 mL / NET: 16.8 mL        Appearance: Normal	  HEENT:   Normal oral mucosa  Lymphatic: No lymphadenopathy  Cardiovascular: Normal S1 S2,         No JVD,      No murmurs,      No edema  Respiratory: Lungs clear to auscultation	  Psychiatry: Mood & affect appropriate  Gastrointestinal:  Soft, Non-tender	  Skin: No rashes, No ecchymoses, No cyanosis	  Neurologic: Non-focal  Extremities: Normal range of motion, No clubbing, cyanosis   Vascular: warm extremities        LABS:	 	    CBC Full  -  ( 08 Sep 2017 04:38 )  WBC Count : 6.2 K/uL  Hemoglobin : 13.2 g/dL  Hematocrit : 39.2 %  Platelet Count - Automated : 140 K/uL  Mean Cell Volume : 99.2 fl  Mean Cell Hemoglobin : 33.5 pg  Mean Cell Hemoglobin Concentration : 33.8 gm/dL  Auto Neutrophil # : 3.2 K/uL  Auto Lymphocyte # : 1.9 K/uL  Auto Monocyte # : 0.6 K/uL  Auto Eosinophil # : 0.5 K/uL  Auto Basophil # : 0.1 K/uL  Auto Neutrophil % : 51.4 %  Auto Lymphocyte % : 30.5 %  Auto Monocyte % : 9.2 %  Auto Eosinophil % : 7.5 %  Auto Basophil % : 1.4 %    09-08    136  |  102  |  21  ----------------------------<  136<H>  4.3   |  22  |  0.81  09-07    136  |  99  |  22  ----------------------------<  172<H>  4.4   |  24  |  0.82    Ca    9.3      08 Sep 2017 04:38  Ca    9.8      07 Sep 2017 11:36  Phos  3.3     09-08  Phos  4.1     09-07  Mg     2.1     09-08  Mg     1.9     09-07    TPro  6.6  /  Alb  3.3  /  TBili  0.3  /  DBili  x   /  AST  36  /  ALT  54<H>  /  AlkPhos  94  09-08  TPro  7.0  /  Alb  3.5  /  TBili  0.4  /  DBili  x   /  AST  41<H>  /  ALT  57<H>  /  AlkPhos  104  09-07      proBNP:     TELEMETRY: 	  [id number]

## 2017-09-08 NOTE — PROGRESS NOTE ADULT - SUBJECTIVE AND OBJECTIVE BOX
INTERVAL HPI/OVERNIGHT EVENTS:        tolerating diet  + bm  no abdominal pain    MEDICATIONS  (STANDING):  heparin  Injectable 5000 Unit(s) SubCutaneous every 8 hours  pantoprazole    Tablet 40 milliGRAM(s) Oral two times a day before meals  sucralfate suspension 1 Gram(s) Oral four times a day  furosemide   Injectable 40 milliGRAM(s) IV Push two times a day    MEDICATIONS  (PRN):  ondansetron Injectable 4 milliGRAM(s) IV Push every 6 hours PRN Nausea and/or Vomiting      Allergies    No Known Allergies    Intolerances        ROS:   General:  No wt loss, fevers, chills, night sweats, fatigue,   Eyes:  Good vision, no reported pain  ENT:  No sore throat, pain, runny nose, dysphagia  CV:  No pain, palpitations, hypo/hypertension  Resp:  No dyspnea, cough, tachypnea, wheezing  GI:  No pain, No nausea, No vomiting, No diarrhea, No constipation, No weight loss, No fever, No pruritis, No rectal bleeding, No tarry stools, No dysphagia,  :  No pain, bleeding, incontinence, nocturia  Muscle:  No pain, weakness  Neuro:  No weakness, tingling, memory problems  Psych:  No fatigue, insomnia, mood problems, depression  Endocrine:  No polyuria, polydipsia, cold/heat intolerance  Heme:  No petechiae, ecchymosis, easy bruisability  Skin:  No rash, tattoos, scars, edema      PHYSICAL EXAM:   Vital Signs:  Vital Signs Last 24 Hrs  T(C): 36.6 (28 Aug 2017 12:05), Max: 36.6 (27 Aug 2017 21:17)  T(F): 97.8 (28 Aug 2017 12:05), Max: 97.8 (27 Aug 2017 21:17)  HR: 104 (28 Aug 2017 12:05) (97 - 104)  BP: 109/82 (28 Aug 2017 12:05) (97/67 - 109/82)  BP(mean): --  RR: 18 (28 Aug 2017 12:05) (18 - 18)  SpO2: 93% (28 Aug 2017 12:05) (93% - 97%)  Daily     Daily Weight in k.5 (28 Aug 2017 04:17)    GENERAL:  Appears stated age, well-groomed, well-nourished, no distress  HEENT:  NC/AT,  conjunctivae clear and pink, no thyromegaly, nodules, adenopathy, no JVD, sclera -anicteric  CHEST:  Full & symmetric excursion, no increased effort, breath sounds clear  HEART:  Regular rhythm, S1, S2, no murmur/rub/S3/S4, no abdominal bruit, no edema  ABDOMEN:  Soft, non-tender, non-distended, normoactive bowel sounds,  no masses ,no hepato-splenomegaly, no signs of chronic liver disease  EXTEREMITIES:  no cyanosis,clubbing or edema  SKIN:  No rash/erythema/ecchymoses/petechiae/wounds/abscess/warm/dry  NEURO:  Alert, oriented, no asterixis, no tremor, no encephalopathy      LABS:                        14.6   4.8   )-----------( 149      ( 27 Aug 2017 09:18 )             44.7             Urinalysis Basic - ( 27 Aug 2017 18:36 )    Color: Yellow / Appearance: Clear / SG: >1.030 / pH: x  Gluc: x / Ketone: Trace  / Bili: Negative / Urobili: Negative   Blood: x / Protein: 30 mg/dL / Nitrite: Negative   Leuk Esterase: Negative / RBC: 2-5 /HPF / WBC 0-2 /HPF   Sq Epi: x / Non Sq Epi: x / Bacteria: x        RADIOLOGY & ADDITIONAL TESTS:

## 2017-09-08 NOTE — PROGRESS NOTE ADULT - PROBLEM SELECTOR PLAN 1
C.I--1.8 and C.O--3.0. SVR~2200  Nitroprusside switched to Milrinone--0.375. c/w Hydralazine 50 tid  Excellent UOP--off of lasix-Goal mixed venous sat of 60 as per HF C.I--1.8 and C.O--3.0. SVR~2200  Nitroprusside switched to Milrinone--0.375. c/w Hydralazine 50 tid  Excellent UOP--off of lasix-Goal mixed venous sat of 60 as per HF  Standing weight 132 lbs--Cordis removed afterwards-HF aware

## 2017-09-08 NOTE — PROGRESS NOTE ADULT - PROBLEM SELECTOR PLAN 1
doing ok: to cont current treatment  no wheezing: on spiriva and symbicort  maintain o2 sao2 above 88%

## 2017-09-08 NOTE — PROGRESS NOTE ADULT - PROBLEM SELECTOR PLAN 1
no findings on CT  echo reviewed  diet as tolerated  miralax bid  given patients frailty and poor cardiac status, virtual colonoscopy preferred over standard colonoscopy as virtual will not need sedation  LVAD next week

## 2017-09-08 NOTE — PROGRESS NOTE ADULT - ATTENDING COMMENTS
No events.  Meds: Milrione 0.375, Off diuretics, HDZN 50 Q8, Heparin  I/O: 1200/3600  CVP 7  Exam unremarkable   -110 BP /  Exam well compensated.  136  |  102  |  21  ----------------------------<  136<H>  4.3   |  22  |  0.81  TTE from yesterday reviewed. Remains with mod-sev post directed MR with tethered post leaflet   Plan:  Virtual colonscopy Monday  LVAD (HMII vs HMIII) tuesday  Continue current medical regimen.  Dr Cadet will decide re need for MValvuloplasty at time of LVAD.  Zi Werner

## 2017-09-08 NOTE — PROGRESS NOTE ADULT - ASSESSMENT
62M NICM  (LVDD 6.7 cm) HFrEF, (LVEF 10%), ACC/AHA stage D with NYHA class IV symptoms, pw abd pain, elevated lactic acid. Initial RHC with depressed CI (0.6).  A-line, swan now removed.     ADHF  /70  i/o  1255/3675  -2419  139lb on admit  -->129 -->131  [id number]  CVP 7, 9 this AM  - milrinone @ 0.375  hydralazine 50 q8  no longer on lasix, plan for prn for CVP>10  labs:  Na 134, Bun/Cr 23/0.74  MV sat 58 @ 0500      + LV thrombus, embolized  - heparin gtt    ?CAD  - atorvastatin 20mg    Abd pain  - on pantoprazole 40 BID,  -sucralfate      LVAD eval  - TTE reviewed, clearly shows functional MR, will hold off on COREY.

## 2017-09-08 NOTE — PROGRESS NOTE ADULT - ASSESSMENT
61 y/o man with a NIDCM with severely reduced LV systolic function, ACC/AHA stage D with NYHA class IV symptoms admitted with cardiogenic shock stabilized with afterload reduction. Nitroprusside switched to milrinone. Being diuresed

## 2017-09-08 NOTE — PROGRESS NOTE ADULT - PROBLEM SELECTOR PLAN 3
Wasted and cachectic  Encourage increased caloric intake. LV thrombus noticed on original TTE which likely embolized   c/w Heparin gtt

## 2017-09-08 NOTE — PROGRESS NOTE ADULT - PROBLEM SELECTOR PLAN 2
C.I--1.8 and C.O--3.0. SVR~2200  c/w Milrinone 0.375 and Hydralazine 50 tid  MAP of 65 mmHg.   He is currently euvolemic. Urine output is adequate  LVAD evaluation initiated--Cleared by ID-serologies sent as  requested by ID. HF following-appreciate input   Not a candidate for Transplant given active smoking

## 2017-09-08 NOTE — PROGRESS NOTE ADULT - SUBJECTIVE AND OBJECTIVE BOX
Patient is a 62y old  Male who presents with a chief complaint of SOB (25 Aug 2017 22:27)    HPI:    Patient is a 62 years old Creole speaking Male with no no significant PMH who presented 6 days ago with SOB and chest pain. Patient states that he had no hx of heart failure or MI. He was vacationing in [facility] for the past month and his symptoms began a week prior to admission. He endorses chest pain which was substernal, non radiating and worse with exertion along with non productive cough and PND. No orthopnea or weight changes. He also endorses anorexia and weight loss. No recent sickness per se and no known sick contacts. He sought medical attention in [facility] but refused to be admitted to the hospital there. [place] back [place] the [place] on [place]2017 and came to [facility] ED. Patient found to be tachycardiac and hypotensive.     During his hospital stay, patient had had a negative CTA but a newly diagnosed systolic CHF with EF 10-15% and severely dilated LA, Severe MR, TR. RHC showed PA systolic pressure of 54, LVEDP 32, CO 1.15, CI 0.63 and EF 10%. Admitted to CCU--started on afterload reduction--nitrprusside switched to Milrinone with improvement in cardiac indices. LVAD evaluation in process     Overnight Event: Patient denies chest pain or SOB. No abdominal pain.     REVIEW OF SYSTEMS:    GEN: no fevers, chills, no night sweats no weight loss , no swollen lymph nodes    EYES: No blurry vision , no changes in vision  ENT: no sores, no runny nose, no sore throat   PULM: no cough, no shortness of breath   CARD: no chest pain, no palpitations    GI : no abdominal pain , no nausea, no vomiting  : no burning urination, no change in color of urine, no flank pain, no blood in urine   MSK: no swelling   SKIN: no bruising or bleeding, no sores in mouth  , no yellowing of the skin  Neuro: no lightheadedness, no headaches, no weakness, no fainting, no confusion , no seizures    MEDICATIONS  (STANDING):  pantoprazole    Tablet 40 milliGRAM(s) Oral two times a day before meals  sucralfate suspension 1 Gram(s) Oral four times a day  atorvastatin 20 milliGRAM(s) Oral at bedtime  tiotropium 18 MICROgram(s) Capsule 1 Capsule(s) Inhalation daily  multivitamin 1 Tablet(s) Oral daily  buDESOnide 160 MICROgram(s)/formoterol 4.5 MICROgram(s) Inhaler 2 Puff(s) Inhalation two times a day  senna 2 Tablet(s) Oral at bedtime  docusate sodium 100 milliGRAM(s) Oral three times a day  polyethylene glycol 3350 17 Gram(s) Oral daily  milrinone Infusion 0.375 MICROgram(s)/kG/Min (6.75 mL/Hr) IV Continuous <Continuous>  hydrALAZINE 50 milliGRAM(s) Oral every 8 hours  heparin  Infusion.  Unit(s)/Hr (11 mL/Hr) IV Continuous <Continuous>    MEDICATIONS  (PRN):  ondansetron Injectable 4 milliGRAM(s) IV Push every 6 hours PRN Nausea and/or Vomiting  aluminum hydroxide/magnesium hydroxide/simethicone Suspension 30 milliLiter(s) Oral every 4 hours PRN Dyspepsia  heparin  Injectable 4500 Unit(s) IV Push every 6 hours PRN For aPTT less than 40  heparin  Injectable 2000 Unit(s) IV Push every 6 hours PRN For aPTT between 40 - 57        PHYSICAL EXAM:    ICU Vital Signs Last 24 Hrs  T(C): 36.6 (08 Sep 2017 07:30), Max: 36.7 (07 Sep 2017 14:00)  T(F): 97.9 (08 Sep 2017 07:30), Max: 98.1 (07 Sep 2017 14:00)  HR: 106 (08 Sep 2017 07:30) (96 - 122)  BP: 91/70 (08 Sep 2017 07:00) (77/59 - 126/95)  BP(mean): 77 (08 Sep 2017 07:00) (64 - 108)  ABP: --  ABP(mean): --  RR: 20 (08 Sep 2017 07:30) (16 - 60)  SpO2: 98% (08 Sep 2017 07:30) (94% - 99%)      Is/Os: Net neg -2.4 Liters     PHYSICAL EXAM:  GENERAL: NAD, Cachexia   HEAD:  Atraumatic, Normocephalic  EYES: EOMI, PERRLA, conjunctiva and sclera clear  NECK: Supple, No JVD  CHEST/LUNG: Clear to auscultation bilaterally; No wheeze  HEART: Regular rate and rhythm; No murmurs, rubs, or gallops  ABDOMEN: Soft, Nontender, Nondistended; Bowel sounds present  EXTREMITIES:  2+ Peripheral Pulses, No clubbing, cyanosis, or edema  PSYCH: AAOx3  NEUROLOGY: non-focal  SKIN: No rashes or lesions      LABS:	 	                                   13.2   6.2   )-----------( 140      ( 08 Sep 2017 04:38 )             39.2     09-08    136  |  102  |  21  ----------------------------<  136<H>  4.3   |  22  |  0.81    Ca    9.3      08 Sep 2017 04:38  Phos  3.3     09-08  Mg     2.1     09-08    TPro  6.6  /  Alb  3.3  /  TBili  0.3  /  DBili  x   /  AST  36  /  ALT  54<H>  /  AlkPhos  94  09-08      < from: Limited Transthoracic Echo (09.07.17 @ 11:04) >  Observations:  Mitral Valve: Tethered mitral valve leaflets with  restricted posterior leaflet. Moderate-severe, posteriorly  directed mitral regurgitation.  Aortic Valve/Aorta:  ------------------------------------------------------------------------  Conclusions:  Limited study to evaluate mitral valve.  1. Tethered mitral valve leaflets with restricted posterior  leaflet. Moderate-severe, posteriorly directed mitral  regurgitation.  *** Compared with echocardiogram of 8/28/2017, the mitral  regurgitation appears slightly improved.  ------------------------------------------------------------------------  Confirmed on  9/7/2017 - 12:18:36 by Grey Negrete M.D.    < end of copied text >

## 2017-09-08 NOTE — PROGRESS NOTE ADULT - SUBJECTIVE AND OBJECTIVE BOX
Does understand English  Patient is a 62y old  Male who presents with a chief complaint of SOB (25 Aug 2017 22:27    pt lying on bed  feeling pretty weak  does not feel SOB       Any change in ROS:     MEDICATIONS  (STANDING):  pantoprazole    Tablet 40 milliGRAM(s) Oral two times a day before meals  sucralfate suspension 1 Gram(s) Oral four times a day  atorvastatin 20 milliGRAM(s) Oral at bedtime  tiotropium 18 MICROgram(s) Capsule 1 Capsule(s) Inhalation daily  multivitamin 1 Tablet(s) Oral daily  buDESOnide 160 MICROgram(s)/formoterol 4.5 MICROgram(s) Inhaler 2 Puff(s) Inhalation two times a day  milrinone Infusion 0.375 MICROgram(s)/kG/Min (6.75 mL/Hr) IV Continuous <Continuous>  hydrALAZINE 50 milliGRAM(s) Oral every 8 hours  heparin  Infusion.  Unit(s)/Hr (11 mL/Hr) IV Continuous <Continuous>    MEDICATIONS  (PRN):  aluminum hydroxide/magnesium hydroxide/simethicone Suspension 30 milliLiter(s) Oral every 4 hours PRN Dyspepsia  heparin  Injectable 4500 Unit(s) IV Push every 6 hours PRN For aPTT less than 40  heparin  Injectable 2000 Unit(s) IV Push every 6 hours PRN For aPTT between 40 - 57    Vital Signs Last 24 Hrs  T(C): 36.6 (08 Sep 2017 11:00), Max: 36.7 (08 Sep 2017 00:00)  T(F): 97.8 (08 Sep 2017 11:00), Max: 98 (08 Sep 2017 00:00)  HR: 116 (08 Sep 2017 13:30) (92 - 122)  BP: 100/73 (08 Sep 2017 13:30) (77/59 - 116/89)  BP(mean): 85 (08 Sep 2017 13:30) (64 - 98)  RR: 19 (08 Sep 2017 13:30) (17 - 60)  SpO2: 98% (08 Sep 2017 13:30) (94% - 100%)    I&O's Summary    07 Sep 2017 07:01  -  08 Sep 2017 07:00  --------------------------------------------------------  IN: 1255.2 mL / OUT: 3675 mL / NET: -2419.8 mL    08 Sep 2017 07:01  -  08 Sep 2017 14:02  --------------------------------------------------------  IN: 640.8 mL / OUT: 200 mL / NET: 440.8 mL          Physical Exam:   GENERAL: NAD, well-groomed, well-developed  HEENT: ALONSO/   Atraumatic, Normocephalic  ENMT: No tonsillar erythema, exudates, or enlargement; Moist mucous membranes, Good dentition, No lesions  NECK: Supple, No JVD, Normal thyroid  CHEST/LUNG: Clear to auscultaion,  CVS: Regular rate and rhythm; No murmurs, rubs, or gallops  GI: : Soft, Nontender, Nondistended; Bowel sounds present  NERVOUS SYSTEM:  Alert & Oriented X3  EXTREMITIES:  2+ Peripheral Pulses, No clubbing, cyanosis, or edema  LYMPH: No lymphadenopathy noted  SKIN: No rashes or lesions  ENDOCRINOLOGY: No Thyromegaly  PSYCH: Appropriate    Labs:  ABG - ( 07 Sep 2017 05:24 )  pH: 7.44  /  pCO2: 38    /  pO2: 97    / HCO3: 25    / Base Excess: 1.5   /  SaO2: 98              21  CARDIAC MARKERS ( 07 Sep 2017 09:04 )  x     / 0.03 ng/mL / 207 U/L / x     / 2.9 ng/mL                            13.2   6.2   )-----------( 140      ( 08 Sep 2017 04:38 )             39.2                         13.1   6.3   )-----------( 138      ( 07 Sep 2017 05:29 )             40.3                         13.3   6.9   )-----------( 135      ( 07 Sep 2017 00:14 )             39.8                         13.1   6.4   )-----------( 128      ( 06 Sep 2017 06:53 )             39.4                         13.5   7.3   )-----------( 135      ( 05 Sep 2017 23:40 )             41.1                         12.4   5.6   )-----------( 115      ( 05 Sep 2017 04:30 )             37.8     09-08    136  |  102  |  21  ----------------------------<  136<H>  4.3   |  22  |  0.81  09-07    136  |  99  |  22  ----------------------------<  172<H>  4.4   |  24  |  0.82  09-07    134<L>  |  101  |  23  ----------------------------<  107<H>  4.4   |  20<L>  |  0.74  09-07    136  |  101  |  25<H>  ----------------------------<  142<H>  4.4   |  20<L>  |  0.75  09-06    134<L>  |  100  |  26<H>  ----------------------------<  114<H>  4.3   |  21<L>  |  0.83  09-05    133<L>  |  99  |  26<H>  ----------------------------<  183<H>  4.1   |  21<L>  |  0.79  09-05    135  |  101  |  25<H>  ----------------------------<  219<H>  4.1   |  23  |  0.82    Ca    9.3      08 Sep 2017 04:38  Ca    9.8      07 Sep 2017 11:36  Ca    8.9      07 Sep 2017 05:29  Ca    9.1      07 Sep 2017 00:14  Phos  3.3     09-08  Phos  4.1     09-07  Phos  3.9     09-07  Phos  3.2     09-07  Mg     2.1     09-08  Mg     1.9     09-07  Mg     1.9     09-07  Mg     1.8     09-07    TPro  6.6  /  Alb  3.3  /  TBili  0.3  /  DBili  x   /  AST  36  /  ALT  54<H>  /  AlkPhos  94  09-08  TPro  7.0  /  Alb  3.5  /  TBili  0.4  /  DBili  x   /  AST  41<H>  /  ALT  57<H>  /  AlkPhos  104  09-07  TPro  6.3  /  Alb  3.2<L>  /  TBili  0.4  /  DBili  x   /  AST  35  /  ALT  54<H>  /  AlkPhos  87  09-07  TPro  6.5  /  Alb  3.3  /  TBili  0.4  /  DBili  x   /  AST  32  /  ALT  54<H>  /  AlkPhos  97  09-07  TPro  6.3  /  Alb  3.2<L>  /  TBili  0.4  /  DBili  x   /  AST  31  /  ALT  54<H>  /  AlkPhos  94  09-06  TPro  6.6  /  Alb  3.5  /  TBili  0.5  /  DBili  x   /  AST  32  /  ALT  60<H>  /  AlkPhos  105  09-05    CAPILLARY BLOOD GLUCOSE          LIVER FUNCTIONS - ( 08 Sep 2017 04:38 )  Alb: 3.3 g/dL / Pro: 6.6 g/dL / ALK PHOS: 94 U/L / ALT: 54 U/L RC / AST: 36 U/L / GGT: x           PT/INR - ( 08 Sep 2017 04:38 )   PT: 11.9 sec;   INR: 1.10 ratio         PTT - ( 08 Sep 2017 12:23 )  PTT:102.2 sec    Cultures:     Studies  Chest X-RAY  CT SCAN Chest   Venous Dopplers: LE:   CT Abdomen  Others    < from: Xray Chest 1 View AP -PORTABLE-Routine (09.08.17 @ 09:38) >    EXAM:  CHEST PORTABLE ROUTINE                            PROCEDURE DATE:  09/08/2017            INTERPRETATION:  A single chest x-ray was obtained on September 8, 2017.    Indication: [place]ariq catheter removal.    Impression:    The heart is slightly enlarged. Mild pulmonary vascular congestion. The   right [place]Tariq catheter was removed and a Cordis sheet catheter is seen   on the right and tip is superior vena cava. No pneumothorax.                    ANIRUDH HUMPHREYS M.D., ATTENDING RADIOLOGIST  This document has been electronically signed. Sep  8 2017 10:14AM        < end of copied text >

## 2017-09-08 NOTE — PROGRESS NOTE ADULT - SUBJECTIVE AND OBJECTIVE BOX
INFECTIOUS DISEASES FOLLOW UP-- Anitra Prater  [phone]    This is a follow up note for this  62yMale with  Other pulmonary embolism without acute cor pulmonale      ROS:  CONSTITUTIONAL:  No fever, good appetite  CARDIOVASCULAR:  No chest pain or palpitations  RESPIRATORY:  No dyspnea  GASTROINTESTINAL:  No nausea, vomiting, diarrhea, or abdominal pain  GENITOURINARY:  No dysuria  NEUROLOGIC:  No headache,     Allergies    No Known Allergies    Intolerances        ANTIBIOTICS/RELEVANT:  antimicrobials    immunologic:    OTHER:  pantoprazole    Tablet 40 milliGRAM(s) Oral two times a day before meals  sucralfate suspension 1 Gram(s) Oral four times a day  atorvastatin 20 milliGRAM(s) Oral at bedtime  tiotropium 18 MICROgram(s) Capsule 1 Capsule(s) Inhalation daily  multivitamin 1 Tablet(s) Oral daily  buDESOnide 160 MICROgram(s)/formoterol 4.5 MICROgram(s) Inhaler 2 Puff(s) Inhalation two times a day  aluminum hydroxide/magnesium hydroxide/simethicone Suspension 30 milliLiter(s) Oral every 4 hours PRN  milrinone Infusion 0.375 MICROgram(s)/kG/Min IV Continuous <Continuous>  hydrALAZINE 50 milliGRAM(s) Oral every 8 hours  heparin  Infusion.  Unit(s)/Hr IV Continuous <Continuous>  heparin  Injectable 4500 Unit(s) IV Push every 6 hours PRN  heparin  Injectable 2000 Unit(s) IV Push every 6 hours PRN      Objective:  Vital Signs Last 24 Hrs  T(C): 36.6 (08 Sep 2017 16:13), Max: 36.7 (08 Sep 2017 00:00)  T(F): 97.8 (08 Sep 2017 16:13), Max: 98 (08 Sep 2017 00:00)  HR: 104 (08 Sep 2017 19:00) (92 - 120)  BP: 97/70 (08 Sep 2017 19:00) (75/65 - 112/85)  BP(mean): 78 (08 Sep 2017 19:00) (64 - 94)  RR: 18 (08 Sep 2017 19:00) (17 - 60)  SpO2: 99% (08 Sep 2017 19:00) (94% - 100%)    PHYSICAL EXAM:  Constitutional:no acute distress  Eyes:ALONSO, EOMI  Ear/Nose/Throat: no oral lesions, 	  Respiratory: clear BL  Cardiovascular: S1S2  Gastrointestinal:soft, (+) BS, no tenderness  Extremities:no e/e/c  No Lymphadenopathy  IV sites not inflammed.    LABS:                        13.2   6.2   )-----------( 140      ( 08 Sep 2017 04:38 )             39.2     09-08    136  |  102  |  21  ----------------------------<  136<H>  4.3   |  22  |  0.81    Ca    9.3      08 Sep 2017 04:38  Phos  3.3     09-08  Mg     2.1     09-08    TPro  6.6  /  Alb  3.3  /  TBili  0.3  /  DBili  x   /  AST  36  /  ALT  54<H>  /  AlkPhos  94  09-08    PT/INR - ( 08 Sep 2017 04:38 )   PT: 11.9 sec;   INR: 1.10 ratio         PTT - ( 08 Sep 2017 19:08 )  PTT:71.4 sec      MICROBIOLOGY:    Quantiferon - Gold Tuberculosis (09.01.17 @ 20:20)    Quantiferon Adjusted Mitogen: 7.62: Positive means M. tuberculosis infection is likely.  Negative means M. tuberculosis infection is NOT likely.  Indeterminate means likelihood for M. tuberculosis infection  is uncertain.  Interpretation Criteria  Quantiferon TB Gold Positive Result:  N7.62: il in IU/mL:   </= 8.0  Adjusted TB Antigen in IU/mL:   >/= 0.35 and >25% of Nil value  Adjusted Mitogen in IU/mL:  Any  Quantiferon TB Gold Negative Result:  Nil in IU/mL:   </= 8.0  Adjusted TB Antigen in IU/mL:   <0.35  Adjusted Mitogen in IU/mL:  7.62: >/= 0.5  Quantiferon TB Gold Indeterminate Result:  Nil in IU/mL:   </= 8.0  Adjusted TB Antigen in IU/mL:   >/= 0.35 and >25% of Nil value  Adjusted Mitogen in IU/mL:  < 0.5  OR  Quantiferon TB Gold Indeterminate Result:  Nil in IU/mL:   > 8.0  Adjus7.62: anibal TB Antigen in IU/mL:   Any  Adjusted Mitogen in IU/mL:  Any  Nil is the background level of interferon gamma   and a negative control.  TB Antigen is the response after stimulation with   M. tuberculosis antigens.  Mitogen is a positive control.  7.62: The Quantiferon-TB Gold Test detects T cell activation   products from lymphocytes which have been previously   sensitized to antigens specific for M. tuberculosis.  The results of this test cannot be used alone to make a   diagnosis of active or late7.62: nt tuberculosis infection.  To make either diagnosis, the patient's entire medical   presentation must be considered.  For further information   on the interpretation of this test refer to   (http://www.cdc.gov/nchstp/tb/).  PLEASE NOTE.  The magnitud7.62: e of the measured IFN   gamma level cannot be correlated to stage or degree of   infection, level of immune responsiveness, or likelihood  for progression to active disease. IU/mL            RECENT CULTURES:      RADIOLOGY & ADDITIONAL STUDIES:

## 2017-09-09 LAB
ALBUMIN SERPL ELPH-MCNC: 3.4 G/DL — SIGNIFICANT CHANGE UP (ref 3.3–5)
ALP SERPL-CCNC: 98 U/L — SIGNIFICANT CHANGE UP (ref 40–120)
ALT FLD-CCNC: 49 U/L RC — HIGH (ref 10–45)
ANION GAP SERPL CALC-SCNC: 12 MMOL/L — SIGNIFICANT CHANGE UP (ref 5–17)
APTT BLD: 51.4 SEC — HIGH (ref 27.5–37.4)
APTT BLD: 59.6 SEC — HIGH (ref 27.5–37.4)
AST SERPL-CCNC: 34 U/L — SIGNIFICANT CHANGE UP (ref 10–40)
BASOPHILS # BLD AUTO: 0.1 K/UL — SIGNIFICANT CHANGE UP (ref 0–0.2)
BASOPHILS # BLD AUTO: 0.1 K/UL — SIGNIFICANT CHANGE UP (ref 0–0.2)
BASOPHILS NFR BLD AUTO: 1.3 % — SIGNIFICANT CHANGE UP (ref 0–2)
BASOPHILS NFR BLD AUTO: 1.4 % — SIGNIFICANT CHANGE UP (ref 0–2)
BILIRUB SERPL-MCNC: 0.2 MG/DL — SIGNIFICANT CHANGE UP (ref 0.2–1.2)
BUN SERPL-MCNC: 22 MG/DL — SIGNIFICANT CHANGE UP (ref 7–23)
CALCIUM SERPL-MCNC: 9 MG/DL — SIGNIFICANT CHANGE UP (ref 8.4–10.5)
CHLORIDE SERPL-SCNC: 100 MMOL/L — SIGNIFICANT CHANGE UP (ref 96–108)
CK MB BLD-MCNC: 1.4 % — SIGNIFICANT CHANGE UP (ref 0–3.5)
CK MB CFR SERPL CALC: 2.6 NG/ML — SIGNIFICANT CHANGE UP (ref 0–6.7)
CK SERPL-CCNC: 191 U/L — SIGNIFICANT CHANGE UP (ref 30–200)
CO2 SERPL-SCNC: 20 MMOL/L — LOW (ref 22–31)
CREAT SERPL-MCNC: 0.83 MG/DL — SIGNIFICANT CHANGE UP (ref 0.5–1.3)
EOSINOPHIL # BLD AUTO: 0.3 K/UL — SIGNIFICANT CHANGE UP (ref 0–0.5)
EOSINOPHIL # BLD AUTO: 0.4 K/UL — SIGNIFICANT CHANGE UP (ref 0–0.5)
EOSINOPHIL NFR BLD AUTO: 5.3 % — SIGNIFICANT CHANGE UP (ref 0–6)
EOSINOPHIL NFR BLD AUTO: 6.8 % — HIGH (ref 0–6)
GLUCOSE SERPL-MCNC: 238 MG/DL — HIGH (ref 70–99)
HCT VFR BLD CALC: 37.9 % — LOW (ref 39–50)
HCT VFR BLD CALC: 39.4 % — SIGNIFICANT CHANGE UP (ref 39–50)
HGB BLD-MCNC: 12.4 G/DL — LOW (ref 13–17)
HGB BLD-MCNC: 13 G/DL — SIGNIFICANT CHANGE UP (ref 13–17)
LYMPHOCYTES # BLD AUTO: 1.3 K/UL — SIGNIFICANT CHANGE UP (ref 1–3.3)
LYMPHOCYTES # BLD AUTO: 1.7 K/UL — SIGNIFICANT CHANGE UP (ref 1–3.3)
LYMPHOCYTES # BLD AUTO: 24.3 % — SIGNIFICANT CHANGE UP (ref 13–44)
LYMPHOCYTES # BLD AUTO: 29.3 % — SIGNIFICANT CHANGE UP (ref 13–44)
MAGNESIUM SERPL-MCNC: 1.7 MG/DL — SIGNIFICANT CHANGE UP (ref 1.6–2.6)
MCHC RBC-ENTMCNC: 32.6 GM/DL — SIGNIFICANT CHANGE UP (ref 32–36)
MCHC RBC-ENTMCNC: 32.7 PG — SIGNIFICANT CHANGE UP (ref 27–34)
MCHC RBC-ENTMCNC: 32.8 PG — SIGNIFICANT CHANGE UP (ref 27–34)
MCHC RBC-ENTMCNC: 33.1 GM/DL — SIGNIFICANT CHANGE UP (ref 32–36)
MCV RBC AUTO: 100 FL — SIGNIFICANT CHANGE UP (ref 80–100)
MCV RBC AUTO: 99.2 FL — SIGNIFICANT CHANGE UP (ref 80–100)
MONOCYTES # BLD AUTO: 0.5 K/UL — SIGNIFICANT CHANGE UP (ref 0–0.9)
MONOCYTES # BLD AUTO: 0.5 K/UL — SIGNIFICANT CHANGE UP (ref 0–0.9)
MONOCYTES NFR BLD AUTO: 10.2 % — SIGNIFICANT CHANGE UP (ref 2–14)
MONOCYTES NFR BLD AUTO: 8.8 % — SIGNIFICANT CHANGE UP (ref 2–14)
NEUTROPHILS # BLD AUTO: 3.1 K/UL — SIGNIFICANT CHANGE UP (ref 1.8–7.4)
NEUTROPHILS # BLD AUTO: 3.1 K/UL — SIGNIFICANT CHANGE UP (ref 1.8–7.4)
NEUTROPHILS NFR BLD AUTO: 53.9 % — SIGNIFICANT CHANGE UP (ref 43–77)
NEUTROPHILS NFR BLD AUTO: 58.7 % — SIGNIFICANT CHANGE UP (ref 43–77)
PHOSPHATE SERPL-MCNC: 2.7 MG/DL — SIGNIFICANT CHANGE UP (ref 2.5–4.5)
PLATELET # BLD AUTO: 138 K/UL — LOW (ref 150–400)
PLATELET # BLD AUTO: 155 K/UL — SIGNIFICANT CHANGE UP (ref 150–400)
POTASSIUM SERPL-MCNC: 4.5 MMOL/L — SIGNIFICANT CHANGE UP (ref 3.5–5.3)
POTASSIUM SERPL-SCNC: 4.5 MMOL/L — SIGNIFICANT CHANGE UP (ref 3.5–5.3)
PROT SERPL-MCNC: 6.5 G/DL — SIGNIFICANT CHANGE UP (ref 6–8.3)
RBC # BLD: 3.78 M/UL — LOW (ref 4.2–5.8)
RBC # BLD: 3.97 M/UL — LOW (ref 4.2–5.8)
RBC # FLD: 14.5 % — SIGNIFICANT CHANGE UP (ref 10.3–14.5)
RBC # FLD: 14.5 % — SIGNIFICANT CHANGE UP (ref 10.3–14.5)
SODIUM SERPL-SCNC: 132 MMOL/L — LOW (ref 135–145)
TROPONIN T SERPL-MCNC: <0.01 NG/ML — SIGNIFICANT CHANGE UP (ref 0–0.06)
WBC # BLD: 5.2 K/UL — SIGNIFICANT CHANGE UP (ref 3.8–10.5)
WBC # BLD: 5.8 K/UL — SIGNIFICANT CHANGE UP (ref 3.8–10.5)
WBC # FLD AUTO: 5.2 K/UL — SIGNIFICANT CHANGE UP (ref 3.8–10.5)
WBC # FLD AUTO: 5.8 K/UL — SIGNIFICANT CHANGE UP (ref 3.8–10.5)

## 2017-09-09 PROCEDURE — 93010 ELECTROCARDIOGRAM REPORT: CPT

## 2017-09-09 PROCEDURE — 99233 SBSQ HOSP IP/OBS HIGH 50: CPT

## 2017-09-09 PROCEDURE — 71010: CPT | Mod: 26

## 2017-09-09 RX ORDER — HEPARIN SODIUM 5000 [USP'U]/ML
4500 INJECTION INTRAVENOUS; SUBCUTANEOUS ONCE
Qty: 0 | Refills: 0 | Status: COMPLETED | OUTPATIENT
Start: 2017-09-09 | End: 2017-09-09

## 2017-09-09 RX ORDER — MAGNESIUM SULFATE 500 MG/ML
1 VIAL (ML) INJECTION ONCE
Qty: 0 | Refills: 0 | Status: COMPLETED | OUTPATIENT
Start: 2017-09-09 | End: 2017-09-09

## 2017-09-09 RX ORDER — HEPARIN SODIUM 5000 [USP'U]/ML
2000 INJECTION INTRAVENOUS; SUBCUTANEOUS EVERY 6 HOURS
Qty: 0 | Refills: 0 | Status: DISCONTINUED | OUTPATIENT
Start: 2017-09-09 | End: 2017-09-12

## 2017-09-09 RX ORDER — HEPARIN SODIUM 5000 [USP'U]/ML
900 INJECTION INTRAVENOUS; SUBCUTANEOUS
Qty: 25000 | Refills: 0 | Status: DISCONTINUED | OUTPATIENT
Start: 2017-09-09 | End: 2017-09-12

## 2017-09-09 RX ORDER — HEPARIN SODIUM 5000 [USP'U]/ML
4500 INJECTION INTRAVENOUS; SUBCUTANEOUS EVERY 6 HOURS
Qty: 0 | Refills: 0 | Status: DISCONTINUED | OUTPATIENT
Start: 2017-09-09 | End: 2017-09-12

## 2017-09-09 RX ADMIN — Medication 50 MILLIGRAM(S): at 06:00

## 2017-09-09 RX ADMIN — Medication 1 TABLET(S): at 11:25

## 2017-09-09 RX ADMIN — Medication 100 GRAM(S): at 07:00

## 2017-09-09 RX ADMIN — Medication 50 MILLIGRAM(S): at 21:14

## 2017-09-09 RX ADMIN — Medication 1 GRAM(S): at 06:00

## 2017-09-09 RX ADMIN — Medication 1 GRAM(S): at 11:26

## 2017-09-09 RX ADMIN — Medication 1 GRAM(S): at 01:00

## 2017-09-09 RX ADMIN — BUDESONIDE AND FORMOTEROL FUMARATE DIHYDRATE 2 PUFF(S): 160; 4.5 AEROSOL RESPIRATORY (INHALATION) at 21:14

## 2017-09-09 RX ADMIN — Medication 1 GRAM(S): at 23:16

## 2017-09-09 RX ADMIN — HEPARIN SODIUM 2000 UNIT(S): 5000 INJECTION INTRAVENOUS; SUBCUTANEOUS at 20:32

## 2017-09-09 RX ADMIN — Medication 50 MILLIGRAM(S): at 14:39

## 2017-09-09 RX ADMIN — Medication 1 GRAM(S): at 18:27

## 2017-09-09 RX ADMIN — HEPARIN SODIUM 900 UNIT(S)/HR: 5000 INJECTION INTRAVENOUS; SUBCUTANEOUS at 12:37

## 2017-09-09 RX ADMIN — ATORVASTATIN CALCIUM 20 MILLIGRAM(S): 80 TABLET, FILM COATED ORAL at 21:14

## 2017-09-09 RX ADMIN — PANTOPRAZOLE SODIUM 40 MILLIGRAM(S): 20 TABLET, DELAYED RELEASE ORAL at 06:00

## 2017-09-09 RX ADMIN — Medication 30 MILLILITER(S): at 08:35

## 2017-09-09 RX ADMIN — TIOTROPIUM BROMIDE 1 CAPSULE(S): 18 CAPSULE ORAL; RESPIRATORY (INHALATION) at 11:26

## 2017-09-09 RX ADMIN — BUDESONIDE AND FORMOTEROL FUMARATE DIHYDRATE 2 PUFF(S): 160; 4.5 AEROSOL RESPIRATORY (INHALATION) at 09:00

## 2017-09-09 RX ADMIN — HEPARIN SODIUM 1000 UNIT(S)/HR: 5000 INJECTION INTRAVENOUS; SUBCUTANEOUS at 20:32

## 2017-09-09 RX ADMIN — PANTOPRAZOLE SODIUM 40 MILLIGRAM(S): 20 TABLET, DELAYED RELEASE ORAL at 18:27

## 2017-09-09 NOTE — PROGRESS NOTE ADULT - SUBJECTIVE AND OBJECTIVE BOX
Patient is a 62y old  Male who presents with a chief complaint of SOB (25 Aug 2017 22:27)    HPI:    Patient is a 62 years old Creole speaking Male with no no significant PMH who presented 6 days ago with SOB and chest pain. Patient states that he had no hx of heart failure or MI. He was vacationing in [facility] for the past month and his symptoms began a week prior to admission. He endorses chest pain which was substernal, non radiating and worse with exertion along with non productive cough and PND. No orthopnea or weight changes. He also endorses anorexia and weight loss. No recent sickness per se and no known sick contacts. He sought medical attention in [facility] but refused to be admitted to the hospital there. [place] the [place] and came to [facility]. Patient found to be tachycardiac and hypotensive.     During his hospital stay, patient had had a negative CTA but a newly diagnosed systolic CHF with EF 10-15% and severely dilated LA, Severe MR, TR. RHC showed PA systolic pressure of 54, LVEDP 32, CO 1.15, CI 0.63 and EF 10%. Admitted to CCU--started on afterload reduction--nitrprusside switched to Milrinone with improvement in cardiac indices. LVAD planned for Tuesday     Overnight Event: Patient denies chest pain or SOB. No abdominal pain.     REVIEW OF SYSTEMS:    GEN: no fevers, chills, no night sweats no weight loss , no swollen lymph nodes    EYES: No blurry vision , no changes in vision  ENT: no sores, no runny nose, no sore throat   PULM: no cough, no shortness of breath   CARD: no chest pain, no palpitations    GI : no abdominal pain , no nausea, no vomiting  : no burning urination, no change in color of urine, no flank pain, no blood in urine   MSK: no swelling   SKIN: no bruising or bleeding, no sores in mouth  , no yellowing of the skin  Neuro: no lightheadedness, no headaches, no weakness, no fainting, no confusion , no seizures    MEDICATIONS  (STANDING):  pantoprazole    Tablet 40 milliGRAM(s) Oral two times a day before meals  sucralfate suspension 1 Gram(s) Oral four times a day  atorvastatin 20 milliGRAM(s) Oral at bedtime  tiotropium 18 MICROgram(s) Capsule 1 Capsule(s) Inhalation daily  multivitamin 1 Tablet(s) Oral daily  buDESOnide 160 MICROgram(s)/formoterol 4.5 MICROgram(s) Inhaler 2 Puff(s) Inhalation two times a day  senna 2 Tablet(s) Oral at bedtime  docusate sodium 100 milliGRAM(s) Oral three times a day  polyethylene glycol 3350 17 Gram(s) Oral daily  milrinone Infusion 0.375 MICROgram(s)/kG/Min (6.75 mL/Hr) IV Continuous <Continuous>  hydrALAZINE 50 milliGRAM(s) Oral every 8 hours  heparin  Infusion.  Unit(s)/Hr (11 mL/Hr) IV Continuous <Continuous>    MEDICATIONS  (PRN):  ondansetron Injectable 4 milliGRAM(s) IV Push every 6 hours PRN Nausea and/or Vomiting  aluminum hydroxide/magnesium hydroxide/simethicone Suspension 30 milliLiter(s) Oral every 4 hours PRN Dyspepsia  heparin  Injectable 4500 Unit(s) IV Push every 6 hours PRN For aPTT less than 40  heparin  Injectable 2000 Unit(s) IV Push every 6 hours PRN For aPTT between 40 - 57        PHYSICAL EXAM:    ICU Vital Signs Last 24 Hrs  T(C): 36.6 (08 Sep 2017 07:30), Max: 36.7 (07 Sep 2017 14:00)  T(F): 97.9 (08 Sep 2017 07:30), Max: 98.1 (07 Sep 2017 14:00)  HR: 106 (08 Sep 2017 07:30) (96 - 122)  BP: 91/70 (08 Sep 2017 07:00) (77/59 - 126/95)  BP(mean): 77 (08 Sep 2017 07:00) (64 - 108)  ABP: --  ABP(mean): --  RR: 20 (08 Sep 2017 07:30) (16 - 60)  SpO2: 98% (08 Sep 2017 07:30) (94% - 99%)      Is/Os: Net neg -2.4 Liters     PHYSICAL EXAM:  GENERAL: NAD, Cachexia   HEAD:  Atraumatic, Normocephalic  EYES: EOMI, PERRLA, conjunctiva and sclera clear  NECK: Supple, No JVD  CHEST/LUNG: Clear to auscultation bilaterally; No wheeze  HEART: Regular rate and rhythm; No murmurs, rubs, or gallops  ABDOMEN: Soft, Nontender, Nondistended; Bowel sounds present  EXTREMITIES:  2+ Peripheral Pulses, No clubbing, cyanosis, or edema  PSYCH: AAOx3  NEUROLOGY: non-focal  SKIN: No rashes or lesions      LABS:	 	                                   13.2   6.2   )-----------( 140      ( 08 Sep 2017 04:38 )             39.2     09-08    136  |  102  |  21  ----------------------------<  136<H>  4.3   |  22  |  0.81    Ca    9.3      08 Sep 2017 04:38  Phos  3.3     09-08  Mg     2.1     09-08    TPro  6.6  /  Alb  3.3  /  TBili  0.3  /  DBili  x   /  AST  36  /  ALT  54<H>  /  AlkPhos  94  09-08      < from: Limited Transthoracic Echo (09.07.17 @ 11:04) >  Observations:  Mitral Valve: Tethered mitral valve leaflets with  restricted posterior leaflet. Moderate-severe, posteriorly  directed mitral regurgitation.  Aortic Valve/Aorta:  ------------------------------------------------------------------------  Conclusions:  Limited study to evaluate mitral valve.  1. Tethered mitral valve leaflets with restricted posterior  leaflet. Moderate-severe, posteriorly directed mitral  regurgitation.  *** Compared with echocardiogram of 8/28/2017, the mitral  regurgitation appears slightly improved.  ------------------------------------------------------------------------  Confirmed on  9/7/2017 - 12:18:36 by Grey Negrete M.D.    < end of copied text > Patient is a 62y old  Male who presents with a chief complaint of SOB (25 Aug 2017 22:27)    HPI:    Patient is a 62 years old Creole speaking Male with no no significant PMH who presented 6 days ago with SOB and chest pain. Patient states that he had no hx of heart failure or MI. He was vacationing in [facility] for the past month and his symptoms began a week prior to admission. He endorses chest pain which was substernal, non radiating and worse with exertion along with non productive cough and PND. No orthopnea or weight changes. He also endorses anorexia and weight loss. No recent sickness per se and no known sick contacts. He sought medical attention in [facility] but refused to be admitted to the hospital there. [place] the [place] and came to [facility]. Patient found to be tachycardiac and hypotensive.     During his hospital stay, patient had had a negative CTA but a newly diagnosed systolic CHF with EF 10-15% and severely dilated LA, Severe MR, TR. RHC showed PA systolic pressure of 54, LVEDP 32, CO 1.15, CI 0.63 and EF 10%. Admitted to CCU--started on afterload reduction--nitrprusside switched to Milrinone with improvement in cardiac indices. LVAD planned for Tuesday     Overnight Event: Patient denies chest pain or SOB. No abdominal pain.     REVIEW OF SYSTEMS:    GEN: no fevers, chills, no night sweats no weight loss , no swollen lymph nodes    EYES: No blurry vision , no changes in vision  ENT: no sores, no runny nose, no sore throat   PULM: no cough, no shortness of breath   CARD: no chest pain, no palpitations    GI : no abdominal pain , no nausea, no vomiting  : no burning urination, no change in color of urine, no flank pain, no blood in urine   MSK: no swelling   SKIN: no bruising or bleeding, no sores in mouth  , no yellowing of the skin  Neuro: no lightheadedness, no headaches, no weakness, no fainting, no confusion , no seizures    MEDICATIONS  (STANDING):  pantoprazole    Tablet 40 milliGRAM(s) Oral two times a day before meals  sucralfate suspension 1 Gram(s) Oral four times a day  atorvastatin 20 milliGRAM(s) Oral at bedtime  tiotropium 18 MICROgram(s) Capsule 1 Capsule(s) Inhalation daily  multivitamin 1 Tablet(s) Oral daily  buDESOnide 160 MICROgram(s)/formoterol 4.5 MICROgram(s) Inhaler 2 Puff(s) Inhalation two times a day  milrinone Infusion 0.375 MICROgram(s)/kG/Min (6.75 mL/Hr) IV Continuous <Continuous>  hydrALAZINE 50 milliGRAM(s) Oral every 8 hours  heparin  Infusion. 900 Unit(s)/Hr (9 mL/Hr) IV Continuous <Continuous>  heparin  Injectable 4500 Unit(s) IV Push once    MEDICATIONS  (PRN):  aluminum hydroxide/magnesium hydroxide/simethicone Suspension 30 milliLiter(s) Oral every 4 hours PRN Dyspepsia  heparin  Injectable 4500 Unit(s) IV Push every 6 hours PRN For aPTT less than 40  heparin  Injectable 2000 Unit(s) IV Push every 6 hours PRN For aPTT between 40 - 57      PHYSICAL EXAM:    ICU Vital Signs Last 24 Hrs  T(C): 36.7 (08 Sep 2017 20:00), Max: 36.7 (08 Sep 2017 20:00)  T(F): 98 (08 Sep 2017 20:00), Max: 98 (08 Sep 2017 20:00)  HR: 114 (09 Sep 2017 12:30) (100 - 124)  BP: 112/81 (09 Sep 2017 12:30) (75/65 - 112/81)  BP(mean): 92 (09 Sep 2017 12:30) (70 - 92)  ABP: --  ABP(mean): --  RR: 29 (09 Sep 2017 12:30) (17 - 33)  SpO2: 96% (09 Sep 2017 12:30) (93% - 99%)    Is/Os:     PHYSICAL EXAM:  GENERAL: NAD, Cachexia   HEAD:  Atraumatic, Normocephalic  EYES: EOMI, PERRLA, conjunctiva and sclera clear  NECK: Supple, No JVD  CHEST/LUNG: Clear to auscultation bilaterally; No wheeze  HEART: Regular rate and rhythm; No murmurs, rubs, or gallops  ABDOMEN: Soft, Nontender, Nondistended; Bowel sounds present  EXTREMITIES:  2+ Peripheral Pulses, No clubbing, cyanosis, or edema  PSYCH: AAOx3  NEUROLOGY: non-focal  SKIN: No rashes or lesions      LABS:	 	                                              13.0   5.2   )-----------( 155      ( 09 Sep 2017 12:07 )             39.4     09-09    132<L>  |  100  |  22  ----------------------------<  238<H>  4.5   |  20<L>  |  0.83    Ca    9.0      09 Sep 2017 02:16  Phos  2.7     09-09  Mg     1.7     09-09    TPro  6.5  /  Alb  3.4  /  TBili  0.2  /  DBili  x   /  AST  34  /  ALT  49<H>  /  AlkPhos  98  09-09      < from: Limited Transthoracic Echo (09.07.17 @ 11:04) >  Observations:  Mitral Valve: Tethered mitral valve leaflets with  restricted posterior leaflet. Moderate-severe, posteriorly  directed mitral regurgitation.  Aortic Valve/Aorta:  ------------------------------------------------------------------------  Conclusions:  Limited study to evaluate mitral valve.  1. Tethered mitral valve leaflets with restricted posterior  leaflet. Moderate-severe, posteriorly directed mitral  regurgitation.  *** Compared with echocardiogram of 8/28/2017, the mitral  regurgitation appears slightly improved.  ------------------------------------------------------------------------  Confirmed on  9/7/2017 - 12:18:36 by Grey Negrete M.D.    < end of copied text >

## 2017-09-09 NOTE — PROGRESS NOTE ADULT - SUBJECTIVE AND OBJECTIVE BOX
Patient is a 62y old  Male who presents with a chief complaint of SOOB (25 Aug 2017 22:27)    doing ok  no SOB at rest  denies having any abdominal pain  does understand English : simple       Any change in ROS:     MEDICATIONS  (STANDING):  pantoprazole    Tablet 40 milliGRAM(s) Oral two times a day before meals  sucralfate suspension 1 Gram(s) Oral four times a day  atorvastatin 20 milliGRAM(s) Oral at bedtime  tiotropium 18 MICROgram(s) Capsule 1 Capsule(s) Inhalation daily  multivitamin 1 Tablet(s) Oral daily  buDESOnide 160 MICROgram(s)/formoterol 4.5 MICROgram(s) Inhaler 2 Puff(s) Inhalation two times a day  milrinone Infusion 0.375 MICROgram(s)/kG/Min (6.75 mL/Hr) IV Continuous <Continuous>  hydrALAZINE 50 milliGRAM(s) Oral every 8 hours  heparin  Infusion. 900 Unit(s)/Hr (9 mL/Hr) IV Continuous <Continuous>  heparin  Injectable 4500 Unit(s) IV Push once  magnesium sulfate  IVPB 1 Gram(s) IV Intermittent once    MEDICATIONS  (PRN):  aluminum hydroxide/magnesium hydroxide/simethicone Suspension 30 milliLiter(s) Oral every 4 hours PRN Dyspepsia  heparin  Injectable 4500 Unit(s) IV Push every 6 hours PRN For aPTT less than 40  heparin  Injectable 2000 Unit(s) IV Push every 6 hours PRN For aPTT between 40 - 57    Vital Signs Last 24 Hrs  T(C): 36.7 (08 Sep 2017 20:00), Max: 36.7 (08 Sep 2017 20:00)  T(F): 98 (08 Sep 2017 20:00), Max: 98 (08 Sep 2017 20:00)  HR: 116 (09 Sep 2017 11:00) (100 - 124)  BP: 104/76 (09 Sep 2017 11:00) (75/65 - 108/74)  BP(mean): 85 (09 Sep 2017 11:00) (70 - 88)  RR: 33 (09 Sep 2017 11:00) (17 - 33)  SpO2: 99% (09 Sep 2017 11:00) (94% - 99%)    I&O's Summary    08 Sep 2017 07:01  -  09 Sep 2017 07:00  --------------------------------------------------------  IN: 783 mL / OUT: 400 mL / NET: 383 mL          Physical Exam:   GENERAL: NAD, well-groomed, well-developed  HEENT: ALONSO/   Atraumatic, Normocephalic  ENMT: No tonsillar erythema, exudates, or enlargement; Moist mucous membranes, Good dentition, No lesions  NECK: Supple, No JVD, Normal thyroid  CHEST/LUNG: Clear to auscultaion, ; No rales, rhonchi, wheezing, or rubs  CVS: Regular rate and rhythm; No murmurs, rubs, or gallops  GI: : Soft, Nontender, Nondistended; Bowel sounds present  NERVOUS SYSTEM:  Alert & Oriented X3  EXTREMITIES:  2+ Peripheral Pulses, No clubbing, cyanosis, or edema  LYMPH: No lymphadenopathy noted  SKIN: No rashes or lesions  ENDOCRINOLOGY: No Thyromegaly  PSYCH: Appropriate    Labs:  21  CARDIAC MARKERS ( 09 Sep 2017 06:53 )  x     / <0.01 ng/mL / 191 U/L / x     / 2.6 ng/mL                            12.4   5.8   )-----------( 138      ( 09 Sep 2017 02:14 )             37.9                         13.2   6.2   )-----------( 140      ( 08 Sep 2017 04:38 )             39.2                         13.1   6.3   )-----------( 138      ( 07 Sep 2017 05:29 )             40.3                         13.3   6.9   )-----------( 135      ( 07 Sep 2017 00:14 )             39.8                         13.1   6.4   )-----------( 128      ( 06 Sep 2017 06:53 )             39.4                         13.5   7.3   )-----------( 135      ( 05 Sep 2017 23:40 )             41.1     09-09    132<L>  |  100  |  22  ----------------------------<  238<H>  4.5   |  20<L>  |  0.83  09-08    136  |  102  |  21  ----------------------------<  136<H>  4.3   |  22  |  0.81  09-07    136  |  99  |  22  ----------------------------<  172<H>  4.4   |  24  |  0.82  09-07    134<L>  |  101  |  23  ----------------------------<  107<H>  4.4   |  20<L>  |  0.74  09-07    136  |  101  |  25<H>  ----------------------------<  142<H>  4.4   |  20<L>  |  0.75  09-06    134<L>  |  100  |  26<H>  ----------------------------<  114<H>  4.3   |  21<L>  |  0.83  09-05    133<L>  |  99  |  26<H>  ----------------------------<  183<H>  4.1   |  21<L>  |  0.79    Ca    9.0      09 Sep 2017 02:16  Ca    9.3      08 Sep 2017 04:38  Phos  2.7     09-09  Phos  3.3     09-08  Mg     1.7     09-09  Mg     2.1     09-08    TPro  6.5  /  Alb  3.4  /  TBili  0.2  /  DBili  x   /  AST  34  /  ALT  49<H>  /  AlkPhos  98  09-09  TPro  6.6  /  Alb  3.3  /  TBili  0.3  /  DBili  x   /  AST  36  /  ALT  54<H>  /  AlkPhos  94  09-08  TPro  7.0  /  Alb  3.5  /  TBili  0.4  /  DBili  x   /  AST  41<H>  /  ALT  57<H>  /  AlkPhos  104  09-07  TPro  6.3  /  Alb  3.2<L>  /  TBili  0.4  /  DBili  x   /  AST  35  /  ALT  54<H>  /  AlkPhos  87  09-07  TPro  6.5  /  Alb  3.3  /  TBili  0.4  /  DBili  x   /  AST  32  /  ALT  54<H>  /  AlkPhos  97  09-07  TPro  6.3  /  Alb  3.2<L>  /  TBili  0.4  /  DBili  x   /  AST  31  /  ALT  54<H>  /  AlkPhos  94  09-06    CAPILLARY BLOOD GLUCOSE          LIVER FUNCTIONS - ( 09 Sep 2017 02:16 )  Alb: 3.4 g/dL / Pro: 6.5 g/dL / ALK PHOS: 98 U/L / ALT: 49 U/L RC / AST: 34 U/L / GGT: x           PT/INR - ( 08 Sep 2017 04:38 )   PT: 11.9 sec;   INR: 1.10 ratio         PTT - ( 09 Sep 2017 02:16 )  PTT:59.6 sec    Cultures:     < from: Xray Chest 1 View AP -PORTABLE-Routine (09.08.17 @ 09:38) >  EXAM:  CHEST PORTABLE ROUTINE                            PROCEDURE DATE:  09/08/2017            INTERPRETATION:  A single chest x-ray was obtained on September 8, 2017.    Indication: Woden-Tariq catheter removal.    Impression:    The heart is slightly enlarged. Mild pulmonary vascular congestion. The   right Woden-Tariq catheter was removed and a Cordis sheet catheter is seen   on the right and tip is superior vena cava. No pneumothorax.                    ANIRUDH HUMPHREYS M.D., ATTENDING RADIOLOGIST  This document has been electronically signed. Sep  8 2017 10:14AM    < end of copied text >                          Studies  Chest X-RAY  CT SCAN Chest   Venous Dopplers: LE:   CT Abdomen  Others

## 2017-09-09 NOTE — PROGRESS NOTE ADULT - PROBLEM SELECTOR PLAN 2
C.I--1.8 and C.O--3.0. SVR~2200  c/w Milrinone 0.375 and Hydralazine 50 tid  MAP of 65 mmHg.   He is currently euvolemic. Urine output is adequate  LVAD evaluation initiated--Cleared by ID-serologies sent as  requested by ID. HF following-appreciate input   Not a candidate for Transplant given active smoking c/w Milrinone 0.375 and Hydralazine 50 tid  MAP of 65 mmHg.   He is currently euvolemic. Urine output is adequate  LVAD evaluation initiated--Cleared by ID-serologies sent as  requested by ID. HF following-appreciate input   Not a candidate for Transplant given active smoking  Virtual Colonoscopy Monday

## 2017-09-09 NOTE — CHART NOTE - NSCHARTNOTEFT_GEN_A_CORE
====================  CCU MIDNIGHT ROUNDS  ====================    RICKY JOINT  [id number]  Patient is a 62y old  Male who presents with a chief complaint of SOB (25 Aug 2017 22:27)      ====================  SUMMARY:  ====================    Patient is a 62 years old Creole speaking Male with no no significant PMH who presented 6 days ago with SOB and chest pain. Patient states that he had no hx of heart failure or MI. He was vacationing in [facility] for the past month and his symptoms began a week prior to admission. He endorses chest pain which was substernal, non radiating and worse with exertion along with non productive cough and PND. No orthopnea or weight changes. He also endorses anorexia and weight loss. No recent sickness per se and no known sick contacts. He sought medical attention in [facility] but refused to be admitted to the hospital there. [place] the [place] and came to [facility]. Patient found to be tachycardiac and hypotensive.     During his hospital stay, patient had had a negative CTA but a newly diagnosed systolic CHF with EF 10-15% and severely dilated LA, Severe MR, TR. RHC showed PA systolic pressure of 54, LVEDP 32, CO 1.15, CI 0.63 and EF 10%. Admitted to CCU--started on afterload reduction--nitrprusside switched to Milrinone with improvement in cardiac indices. LVAD evaluation in process       ====================  NEW EVENTS:  ====================    No events ON. Patient has no complaints at this time.      ====================  VITALS (Last 12 hrs):  ====================    T(C): 36.7 (09-09-17 @ 20:00), Max: 36.7 (09-09-17 @ 16:30)  T(F): 98.1 (09-09-17 @ 20:00), Max: 98.1 (09-09-17 @ 20:00)  HR: 114 (09-09-17 @ 22:00) (108 - 118)  BP: 108/77 (09-09-17 @ 22:00) (90/72 - 112/81)  BP(mean): 87 (09-09-17 @ 22:00) (72 - 92)  ABP: --  ABP(mean): --  RR: 22 (09-09-17 @ 22:00) (19 - 28)  SpO2: 96% (09-09-17 @ 22:00) (93% - 100%)  Wt(kg): --  CVP(mm Hg): --  CVP(cm H2O): --  CO: --  CI: --  PA: --  PA(mean): --  PCWP: --  SVR: --  PVR: --    I&O's Summary    08 Sep 2017 07:01  -  09 Sep 2017 07:00  --------------------------------------------------------  IN: 783 mL / OUT: 400 mL / NET: 383 mL    09 Sep 2017 07:01  -  09 Sep 2017 22:26  --------------------------------------------------------  IN: 822 mL / OUT: 1405 mL / NET: -583 mL      MEDICATIONS  (STANDING):  pantoprazole    Tablet 40 milliGRAM(s) Oral two times a day before meals  sucralfate suspension 1 Gram(s) Oral four times a day  atorvastatin 20 milliGRAM(s) Oral at bedtime  tiotropium 18 MICROgram(s) Capsule 1 Capsule(s) Inhalation daily  multivitamin 1 Tablet(s) Oral daily  buDESOnide 160 MICROgram(s)/formoterol 4.5 MICROgram(s) Inhaler 2 Puff(s) Inhalation two times a day  milrinone Infusion 0.375 MICROgram(s)/kG/Min (6.75 mL/Hr) IV Continuous <Continuous>  hydrALAZINE 50 milliGRAM(s) Oral every 8 hours  heparin  Infusion. 900 Unit(s)/Hr (9 mL/Hr) IV Continuous <Continuous>  heparin  Injectable 4500 Unit(s) IV Push once    MEDICATIONS  (PRN):  aluminum hydroxide/magnesium hydroxide/simethicone Suspension 30 milliLiter(s) Oral every 4 hours PRN Dyspepsia  heparin  Injectable 4500 Unit(s) IV Push every 6 hours PRN For aPTT less than 40  heparin  Injectable 2000 Unit(s) IV Push every 6 hours PRN For aPTT between 40 - 57        ====================  NEW LABS:  ====================                          13.3   5.5   )-----------( 157      ( 09 Sep 2017 19:24 )             41.2     09-09    132<L>  |  100  |  22  ----------------------------<  238<H>  4.5   |  20<L>  |  0.83    Ca    9.0      09 Sep 2017 02:16  Phos  2.7     09-09  Mg     1.7     09-09    TPro  6.5  /  Alb  3.4  /  TBili  0.2  /  DBili  x   /  AST  34  /  ALT  49<H>  /  AlkPhos  98  09-09    PT/INR - ( 08 Sep 2017 04:38 )   PT: 11.9 sec;   INR: 1.10 ratio         PTT - ( 09 Sep 2017 19:24 )  PTT:48.7 sec  Creatine Kinase, Serum: 191 U/L (09-09-17 @ 06:53)  Troponin T, Serum: <0.01 ng/mL (09-09-17 @ 06:53)    CKMB Units: 2.6 ng/mL (09-09 @ 06:53)          ====================  PLAN:  ====================  - Continue Hydralazine for afterload reduction  - Continue Milrinone gtt  - Monitor I & O's and electrolytes; replete as needed  - LVAD w/u per HF  - OOB to chair as tolerated      Dion Hernandez MD  PGY2 Internal Medicine Resident  Pager: [phone] - [place]  [phone] - [facility]

## 2017-09-09 NOTE — PROGRESS NOTE ADULT - SUBJECTIVE AND OBJECTIVE BOX
In to see Patient today with his sisters and niece present to do an overview of the VAD and to make sure that patient understands the plan going forward. Dr. Werner in and explained the plan of VAD next week and that we would like him to get listed for a heart transplant after that.  Dr. Werner answered the families questions. Patient does understand and wants to proceed with planned implant next week.    I showed the patient and family the Demo HMII LVAD pump , the controller and the batteries. The placed batteries into clips, placed battery on pocket controller.  I left with the patient and family:  1. Home Inspection sheet.  2. VAD Consent  3. IRB consent  4. [employer] and [facility] questionaires    I left my card with my cell and said any questions please call. ([phone])  I will follow up with patient on Monday 9/11/17.  LVAD Implant is scheduled for Tuesday 9/12/17.

## 2017-09-09 NOTE — PROGRESS NOTE ADULT - ASSESSMENT
61 y/o man with a NIDCM with severely reduced LV systolic function, ACC/AHA stage D with NYHA class IV symptoms admitted with cardiogenic shock stabilized with afterload reduction. Nitroprusside switched to milrinone. Being diuresed 63 y/o man with a NIDCM with severely reduced LV systolic function, ACC/AHA stage D with NYHA class IV symptoms admitted with cardiogenic shock stabilized with afterload reduction. Nitroprusside switched to milrinone. LVAD likely Tuesday

## 2017-09-09 NOTE — PROGRESS NOTE ADULT - PROBLEM SELECTOR PLAN 1
C.I--1.8 and C.O--3.0. SVR~2200  Nitroprusside switched to Milrinone--0.375. c/w Hydralazine 50 tid  Excellent UOP--off of lasix-Goal mixed venous sat of 60 as per HF  Standing weight 132 lbs--Cordis removed afterwards-HF aware Nitroprusside switched jamie/wExcellent UOP--off of lasix  Cordis and Mal removed   Standing weight 132 lbs

## 2017-09-09 NOTE — PROGRESS NOTE ADULT - PROBLEM SELECTOR PLAN 2
on milrinone, lasix, and hydralazine LVAD per cardiology  today on IV heparin , for possible LV thrombus: ? embolization to abdominal vessels as a cause for pain? no COREY now: for LVAD

## 2017-09-10 DIAGNOSIS — I42.8 OTHER CARDIOMYOPATHIES: ICD-10-CM

## 2017-09-10 LAB
ALBUMIN SERPL ELPH-MCNC: 3.7 G/DL — SIGNIFICANT CHANGE UP (ref 3.3–5)
ALP SERPL-CCNC: 98 U/L — SIGNIFICANT CHANGE UP (ref 40–120)
ALT FLD-CCNC: 45 U/L RC — SIGNIFICANT CHANGE UP (ref 10–45)
ANION GAP SERPL CALC-SCNC: 13 MMOL/L — SIGNIFICANT CHANGE UP (ref 5–17)
AST SERPL-CCNC: 29 U/L — SIGNIFICANT CHANGE UP (ref 10–40)
BILIRUB SERPL-MCNC: 0.3 MG/DL — SIGNIFICANT CHANGE UP (ref 0.2–1.2)
BUN SERPL-MCNC: 23 MG/DL — SIGNIFICANT CHANGE UP (ref 7–23)
CALCIUM SERPL-MCNC: 9.1 MG/DL — SIGNIFICANT CHANGE UP (ref 8.4–10.5)
CHLORIDE SERPL-SCNC: 101 MMOL/L — SIGNIFICANT CHANGE UP (ref 96–108)
CO2 SERPL-SCNC: 22 MMOL/L — SIGNIFICANT CHANGE UP (ref 22–31)
CREAT SERPL-MCNC: 1.01 MG/DL — SIGNIFICANT CHANGE UP (ref 0.5–1.3)
GLUCOSE SERPL-MCNC: 165 MG/DL — HIGH (ref 70–99)
HCT VFR BLD CALC: 38.1 % — LOW (ref 39–50)
HGB BLD-MCNC: 12.8 G/DL — LOW (ref 13–17)
MAGNESIUM SERPL-MCNC: 1.9 MG/DL — SIGNIFICANT CHANGE UP (ref 1.6–2.6)
MCHC RBC-ENTMCNC: 33.5 GM/DL — SIGNIFICANT CHANGE UP (ref 32–36)
MCHC RBC-ENTMCNC: 33.5 PG — SIGNIFICANT CHANGE UP (ref 27–34)
MCV RBC AUTO: 100 FL — SIGNIFICANT CHANGE UP (ref 80–100)
PHOSPHATE SERPL-MCNC: 3.4 MG/DL — SIGNIFICANT CHANGE UP (ref 2.5–4.5)
PLATELET # BLD AUTO: 168 K/UL — SIGNIFICANT CHANGE UP (ref 150–400)
POTASSIUM SERPL-MCNC: 4.5 MMOL/L — SIGNIFICANT CHANGE UP (ref 3.5–5.3)
POTASSIUM SERPL-SCNC: 4.5 MMOL/L — SIGNIFICANT CHANGE UP (ref 3.5–5.3)
PROT SERPL-MCNC: 7.1 G/DL — SIGNIFICANT CHANGE UP (ref 6–8.3)
RBC # BLD: 3.81 M/UL — LOW (ref 4.2–5.8)
RBC # FLD: 14.3 % — SIGNIFICANT CHANGE UP (ref 10.3–14.5)
SODIUM SERPL-SCNC: 136 MMOL/L — SIGNIFICANT CHANGE UP (ref 135–145)
WBC # BLD: 5.9 K/UL — SIGNIFICANT CHANGE UP (ref 3.8–10.5)
WBC # FLD AUTO: 5.9 K/UL — SIGNIFICANT CHANGE UP (ref 3.8–10.5)

## 2017-09-10 PROCEDURE — 71010: CPT | Mod: 26

## 2017-09-10 PROCEDURE — 93010 ELECTROCARDIOGRAM REPORT: CPT

## 2017-09-10 PROCEDURE — 99233 SBSQ HOSP IP/OBS HIGH 50: CPT

## 2017-09-10 RX ORDER — SOD SULF/SODIUM/NAHCO3/KCL/PEG
1000 SOLUTION, RECONSTITUTED, ORAL ORAL EVERY 6 HOURS
Qty: 0 | Refills: 0 | Status: COMPLETED | OUTPATIENT
Start: 2017-09-10 | End: 2017-09-11

## 2017-09-10 RX ORDER — MAGNESIUM SULFATE 500 MG/ML
1 VIAL (ML) INJECTION ONCE
Qty: 0 | Refills: 0 | Status: COMPLETED | OUTPATIENT
Start: 2017-09-10 | End: 2017-09-10

## 2017-09-10 RX ADMIN — Medication 50 MILLIGRAM(S): at 21:33

## 2017-09-10 RX ADMIN — TIOTROPIUM BROMIDE 1 CAPSULE(S): 18 CAPSULE ORAL; RESPIRATORY (INHALATION) at 12:07

## 2017-09-10 RX ADMIN — PANTOPRAZOLE SODIUM 40 MILLIGRAM(S): 20 TABLET, DELAYED RELEASE ORAL at 17:25

## 2017-09-10 RX ADMIN — ATORVASTATIN CALCIUM 20 MILLIGRAM(S): 80 TABLET, FILM COATED ORAL at 21:33

## 2017-09-10 RX ADMIN — PANTOPRAZOLE SODIUM 40 MILLIGRAM(S): 20 TABLET, DELAYED RELEASE ORAL at 05:17

## 2017-09-10 RX ADMIN — Medication 100 GRAM(S): at 06:19

## 2017-09-10 RX ADMIN — Medication 50 MILLIGRAM(S): at 15:01

## 2017-09-10 RX ADMIN — Medication 1 GRAM(S): at 12:07

## 2017-09-10 RX ADMIN — Medication 1 GRAM(S): at 05:17

## 2017-09-10 RX ADMIN — Medication 1000 MILLILITER(S): at 17:26

## 2017-09-10 RX ADMIN — HEPARIN SODIUM 1000 UNIT(S)/HR: 5000 INJECTION INTRAVENOUS; SUBCUTANEOUS at 08:04

## 2017-09-10 RX ADMIN — HEPARIN SODIUM 1000 UNIT(S)/HR: 5000 INJECTION INTRAVENOUS; SUBCUTANEOUS at 16:02

## 2017-09-10 RX ADMIN — Medication 1 TABLET(S): at 12:07

## 2017-09-10 RX ADMIN — BUDESONIDE AND FORMOTEROL FUMARATE DIHYDRATE 2 PUFF(S): 160; 4.5 AEROSOL RESPIRATORY (INHALATION) at 20:47

## 2017-09-10 RX ADMIN — Medication 50 MILLIGRAM(S): at 05:19

## 2017-09-10 RX ADMIN — Medication 1 GRAM(S): at 17:25

## 2017-09-10 RX ADMIN — BUDESONIDE AND FORMOTEROL FUMARATE DIHYDRATE 2 PUFF(S): 160; 4.5 AEROSOL RESPIRATORY (INHALATION) at 08:00

## 2017-09-10 RX ADMIN — Medication 1 GRAM(S): at 23:13

## 2017-09-10 NOTE — PROGRESS NOTE ADULT - SUBJECTIVE AND OBJECTIVE BOX
24H hour events:  No acute events in last 24 hours. Patient seen in bed, states feels well, improved since admission.     MEDICATIONS:  milrinone Infusion 0.375 MICROgram(s)/kG/Min IV Continuous <Continuous>  hydrALAZINE 50 milliGRAM(s) Oral every 8 hours  heparin  Infusion. 900 Unit(s)/Hr IV Continuous <Continuous>  heparin  Injectable 4500 Unit(s) IV Push once  heparin  Injectable 4500 Unit(s) IV Push every 6 hours PRN  heparin  Injectable 2000 Unit(s) IV Push every 6 hours PRN      tiotropium 18 MICROgram(s) Capsule 1 Capsule(s) Inhalation daily  buDESOnide 160 MICROgram(s)/formoterol 4.5 MICROgram(s) Inhaler 2 Puff(s) Inhalation two times a day      pantoprazole    Tablet 40 milliGRAM(s) Oral two times a day before meals  sucralfate suspension 1 Gram(s) Oral four times a day  aluminum hydroxide/magnesium hydroxide/simethicone Suspension 30 milliLiter(s) Oral every 4 hours PRN    atorvastatin 20 milliGRAM(s) Oral at bedtime    multivitamin 1 Tablet(s) Oral daily        PHYSICAL EXAM:  T(C): 36.7 (09-10-17 @ 04:00), Max: 36.8 (09-10-17 @ 00:00)  HR: 112 (09-10-17 @ 06:00) (108 - 118)  BP: 96/76 (09-10-17 @ 06:00) (90/72 - 112/81)  RR: 26 (09-10-17 @ 06:00) (17 - 29)  SpO2: 97% (09-10-17 @ 06:00) (93% - 100%)  Wt(kg): --  I&O's Summary    09 Sep 2017 07:01  -  10 Sep 2017 07:00  --------------------------------------------------------  IN: 1023.2 mL / OUT: 2105 mL / NET: -1081.8 mL        Appearance: Normal	  HEENT:   Normal oral mucosa  Lymphatic: No lymphadenopathy  Cardiovascular: Normal S1 S2,         No JVD,      No murmurs,      No edema  Respiratory: Lungs clear to auscultation	  Psychiatry: Mood & affect appropriate  Gastrointestinal:  Soft, Non-tender	  Skin: No rashes, No ecchymoses, No cyanosis	  Neurologic: Non-focal  Extremities: Normal range of motion, No clubbing, cyanosis   Vascular: warm extremities        LABS:	 	    CBC Full  -  ( 10 Sep 2017 02:52 )  WBC Count : 5.9 K/uL  Hemoglobin : 12.8 g/dL  Hematocrit : 38.1 %  Platelet Count - Automated : 168 K/uL  Mean Cell Volume : 100.0 fl  Mean Cell Hemoglobin : 33.5 pg  Mean Cell Hemoglobin Concentration : 33.5 gm/dL  Auto Neutrophil # : x  Auto Lymphocyte # : x  Auto Monocyte # : x  Auto Eosinophil # : x  Auto Basophil # : x  Auto Neutrophil % : x  Auto Lymphocyte % : x  Auto Monocyte % : x  Auto Eosinophil % : x  Auto Basophil % : x    09-10    136  |  101  |  23  ----------------------------<  165<H>  4.5   |  22  |  1.01  09-09    132<L>  |  100  |  22  ----------------------------<  238<H>  4.5   |  20<L>  |  0.83    Ca    9.1      10 Sep 2017 02:52  Ca    9.0      09 Sep 2017 02:16  Phos  3.4     09-10  Phos  2.7     09-09  Mg     1.9     09-10  Mg     1.7     09-09    TPro  7.1  /  Alb  3.7  /  TBili  0.3  /  DBili  x   /  AST  29  /  ALT  45  /  AlkPhos  98  09-10  TPro  6.5  /  Alb  3.4  /  TBili  0.2  /  DBili  x   /  AST  34  /  ALT  49<H>  /  AlkPhos  98  09-09      proBNP:

## 2017-09-10 NOTE — PROGRESS NOTE ADULT - ATTENDING COMMENTS
No events.  Milrinone 0.375, HDZN 50 Q8, Heparin, no diuretics.   I/O: -1litre  HR 95 BP 85-95/  136  |  101  |  23  ----------------------------<  165<H>  4.5   |  22  |  1.01  Patient remains intermacs 2.  For HMII as BTR/DT on Tuesday No events.  Milrinone 0.375, HDZN 50 Q8, Heparin, no diuretics.   exam well compensated. lungs clear. abdo soft no liver.   I/O: -1litre  HR 95 BP 85-95/  136  |  101  |  23  ----------------------------<  165<H>  4.5   |  22  |  1.01  Patient remains intermacs 2.  For HMII as BTR/DT on Tuesday  No indications for diuretics.   Zi Werner

## 2017-09-10 NOTE — PROGRESS NOTE ADULT - ASSESSMENT
62M NICM  (LVDD 6.7 cm) HFrEF, (LVEF 10%), ACC/AHA stage D with NYHA class IV symptoms, pw abd pain, elevated lactic acid. Initial RHC with depressed CI (0.6).  A-line, swan now removed.     ADHF  p95-110,  85-95/60   131 (9/10, bed wt) <-- 132.9 (9/8,standing) <-- 131 (9/8,bed)  - milrinone @ 0.375  hydralazine 50 q8  heparin gtt  no longer on lasix, will dose based on standing wts      + LV thrombus, embolized  - heparin gtt    ?CAD  - atorvastatin 20mg    Abd pain  - on pantoprazole 40 BID,  -sucralfate      LVAD eval  - pland for LVAD early next week

## 2017-09-10 NOTE — PROGRESS NOTE ADULT - ASSESSMENT
This is a 62 year old man with a NIDCM LVEF 10-15%, ACC/AHA stage D with NYHA class IV symptoms admitted with cardiogenic shock stabilized with afterload reduction. Now on  milrinone. LVAD likely Tuesday. S/P LVAD education.  For virtual Colonscopy tmrw.

## 2017-09-10 NOTE — PROGRESS NOTE ADULT - SUBJECTIVE AND OBJECTIVE BOX
CHIEF COMPLAINT::    INTERVAL HISTORY:    REVIEW OF SYSTEMS: all others negative    MEDICATIONS  (STANDING):  pantoprazole    Tablet 40 milliGRAM(s) Oral two times a day before meals  sucralfate suspension 1 Gram(s) Oral four times a day  atorvastatin 20 milliGRAM(s) Oral at bedtime  tiotropium 18 MICROgram(s) Capsule 1 Capsule(s) Inhalation daily  multivitamin 1 Tablet(s) Oral daily  buDESOnide 160 MICROgram(s)/formoterol 4.5 MICROgram(s) Inhaler 2 Puff(s) Inhalation two times a day  milrinone Infusion 0.375 MICROgram(s)/kG/Min (6.75 mL/Hr) IV Continuous <Continuous>  hydrALAZINE 50 milliGRAM(s) Oral every 8 hours  heparin  Infusion. 900 Unit(s)/Hr (9 mL/Hr) IV Continuous <Continuous>  heparin  Injectable 4500 Unit(s) IV Push once    MEDICATIONS  (PRN):  aluminum hydroxide/magnesium hydroxide/simethicone Suspension 30 milliLiter(s) Oral every 4 hours PRN Dyspepsia  heparin  Injectable 4500 Unit(s) IV Push every 6 hours PRN For aPTT less than 40  heparin  Injectable 2000 Unit(s) IV Push every 6 hours PRN For aPTT between 40 - 57      Objective:  Vital Signs Last 24 Hrs  T(C): 36.7 (10 Sep 2017 04:00), Max: 36.8 (10 Sep 2017 00:00)  T(F): 98 (10 Sep 2017 04:00), Max: 98.2 (10 Sep 2017 00:00)  HR: 112 (10 Sep 2017 06:00) (108 - 118)  BP: 96/76 (10 Sep 2017 06:00) (90/72 - 112/81)  BP(mean): 83 (10 Sep 2017 06:00) (72 - 92)  RR: 26 (10 Sep 2017 06:00) (17 - 29)  SpO2: 97% (10 Sep 2017 06:00) (93% - 100%)  ICU Vital Signs Last 24 Hrs  T(C): 36.7 (10 Sep 2017 04:00), Max: 36.8 (10 Sep 2017 00:00)  T(F): 98 (10 Sep 2017 04:00), Max: 98.2 (10 Sep 2017 00:00)  HR: 112 (10 Sep 2017 06:00) (108 - 118)  BP: 96/76 (10 Sep 2017 06:00) (90/72 - 112/81)  BP(mean): 83 (10 Sep 2017 06:00) (72 - 92)  ABP: --  ABP(mean): --  RR: 26 (10 Sep 2017 06:00) (17 - 29)  SpO2: 97% (10 Sep 2017 06:00) (93% - 100%)      Adult Advanced Hemodynamics Last 24 Hrs  CVP(mm Hg): --  CVP(cm H2O): --  CO: --  CI: --  PA: --  PA(mean): --  PCWP: --  SVR: --  SVRI: --  PVR: --  PVRI: --       @ 07:01  -   @ 07:00  --------------------------------------------------------  IN: 783 mL / OUT: 400 mL / NET: 383 mL     @ 07:01  -  09-10 @ 06:43  --------------------------------------------------------  IN: 1023.2 mL / OUT: 2105 mL / NET: -1081.8 mL      Daily     Daily Weight in k.8 (10 Sep 2017 05:00)    PHYSICAL EXAM:      Constitutional:    Eyes:    ENMT:    Neck:    Breasts:    Back:    Respiratory:    Cardiovascular:    Gastrointestinal:    Genitourinary:    Rectal:    Extremities:    Vascular:    Neurological:    Skin:    Lymph Nodes:    Musculoskeletal:    Psychiatric:        TELEMETRY:     EKG:     CARDIAC CATH:     ECHO:    IMAGIN.8   5.9   )-----------( 168      ( 10 Sep 2017 02:52 )             38.1     -10    136  |  101  |  23  ----------------------------<  165<H>  4.5   |  22  |  1.01    Ca    9.1      10 Sep 2017 02:52  Phos  3.4     09-10  Mg     1.9     09-10    TPro  7.1  /  Alb  3.7  /  TBili  0.3  /  DBili  x   /  AST  29  /  ALT  45  /  AlkPhos  98  09-10    LIVER FUNCTIONS - ( 10 Sep 2017 02:52 )  Alb: 3.7 g/dL / Pro: 7.1 g/dL / ALK PHOS: 98 U/L / ALT: 45 U/L RC / AST: 29 U/L / GGT: x           PTT - ( 10 Sep 2017 02:52 )  PTT:70.9 sec  Creatine Kinase, Serum: 191 U/L ( @ 06:53)  CKMB Units: 2.6 ng/mL ( @ 06:53)  Troponin T, Serum: <0.01 ng/mL ( @ 06:53)      *BLOOD GAS/ARTERIAL/MIXED/VENOUS  *LACTATE      HEALTH ISSUES - PROBLEM Dx:  Thrombus: Thrombus  Encounter for palliative care: Encounter for palliative care  Cardiac cachexia: Cardiac cachexia  Generalized abdominal pain: Generalized abdominal pain  Smoking: Smoking  Acute on chronic systolic heart failure, NYHA class 4: Acute on chronic systolic heart failure, NYHA class 4  Cardiogenic shock: Cardiogenic shock  CKD (chronic kidney disease) stage 3, GFR 30-59 ml/min: CKD (chronic kidney disease) stage 3, GFR 30-59 ml/min  Need for prophylactic measure: Need for prophylactic measure  Central sleep apnea secondary to congestive heart failure (CHF): Central sleep apnea secondary to congestive heart failure (CHF)  Pulmonary emphysema, unspecified emphysema type: Pulmonary emphysema, unspecified emphysema type  Left ventricular thrombus without MI: Left ventricular thrombus without MI  Acute systolic heart failure, first episode: Acute systolic heart failure, first episode  Coronary artery disease: Coronary artery disease  Smoker: Smoker  Acute systolic congestive heart failure: Acute systolic congestive heart failure  COPD (chronic obstructive pulmonary disease): COPD (chronic obstructive pulmonary disease)  Bilious vomiting with nausea: Bilious vomiting with nausea  Abdominal pain, unspecified location: Abdominal pain, unspecified location  Other chest pain: Other chest pain        HEALTH ISSUES - R/O PROBLEM Dx:      Ruben Walsh, CCU NP   # CHIEF COMPLAINT: ADHF, NIDCM for LVAD    INTERVAL HISTORY: No events overnight    REVIEW OF SYSTEMS: Denies SOB, CP, all others negative    MEDICATIONS  (STANDING):  pantoprazole    Tablet 40 milliGRAM(s) Oral two times a day before meals  sucralfate suspension 1 Gram(s) Oral four times a day  atorvastatin 20 milliGRAM(s) Oral at bedtime  tiotropium 18 MICROgram(s) Capsule 1 Capsule(s) Inhalation daily  multivitamin 1 Tablet(s) Oral daily  buDESOnide 160 MICROgram(s)/formoterol 4.5 MICROgram(s) Inhaler 2 Puff(s) Inhalation two times a day  milrinone Infusion 0.375 MICROgram(s)/kG/Min (6.75 mL/Hr) IV Continuous <Continuous>  hydrALAZINE 50 milliGRAM(s) Oral every 8 hours  heparin  Infusion. 900 Unit(s)/Hr (9 mL/Hr) IV Continuous <Continuous>  heparin  Injectable 4500 Unit(s) IV Push once    MEDICATIONS  (PRN):  aluminum hydroxide/magnesium hydroxide/simethicone Suspension 30 milliLiter(s) Oral every 4 hours PRN Dyspepsia  heparin  Injectable 4500 Unit(s) IV Push every 6 hours PRN For aPTT less than 40  heparin  Injectable 2000 Unit(s) IV Push every 6 hours PRN For aPTT between 40 - 57      Objective:  Vital Signs Last 24 Hrs  T(C): 36.7 (10 Sep 2017 04:00), Max: 36.8 (10 Sep 2017 00:00)  T(F): 98 (10 Sep 2017 04:00), Max: 98.2 (10 Sep 2017 00:00)  HR: 112 (10 Sep 2017 06:00) (108 - 118)  BP: 96/76 (10 Sep 2017 06:00) (90/72 - 112/81)  BP(mean): 83 (10 Sep 2017 06:00) (72 - 92)  RR: 26 (10 Sep 2017 06:00) (17 - 29)  SpO2: 97% (10 Sep 2017 06:00) (93% - 100%)  ICU Vital Signs Last 24 Hrs  T(C): 36.7 (10 Sep 2017 04:00), Max: 36.8 (10 Sep 2017 00:00)  T(F): 98 (10 Sep 2017 04:00), Max: 98.2 (10 Sep 2017 00:00)  HR: 112 (10 Sep 2017 06:00) (108 - 118)  BP: 96/76 (10 Sep 2017 06:00) (90/72 - 112/81)  BP(mean): 83 (10 Sep 2017 06:00) (72 - 92)  ABP: --  ABP(mean): --  RR: 26 (10 Sep 2017 06:00) (17 - 29)  SpO2: 97% (10 Sep 2017 06:00) (93% - 100%)      Adult Advanced Hemodynamics Last 24 Hrs  CVP(mm Hg): --  CVP(cm H2O): --  CO: --  CI: --  PA: --  PA(mean): --  PCWP: --  SVR: --  SVRI: --  PVR: --  PVRI: --       @ 07:01  -   @ 07:00  --------------------------------------------------------  IN: 783 mL / OUT: 400 mL / NET: 383 mL     @ 07:01  -  09-10 @ 06:43  --------------------------------------------------------  IN: 1023.2 mL / OUT: 2105 mL / NET: -1081.8 mL      Daily     Daily Weight in k.8 (10 Sep 2017 05:00)    PHYSICAL EXAM:      Constitutional: No acute distress    Neuro: A+OX3    Respiratory: cta BIlaterally    Cardiovascular: Tachy No JVD    Gastrointestinal: +BS    Extremities: No pedal edema    Vascular: +1 Pdal pulses            TELEMETRY: ST    EKG:     CARDIAC CATH:     ECHO:    IMAGIN.8   5.9   )-----------( 168      ( 10 Sep 2017 02:52 )             38.1     0910    136  |  101  |  23  ----------------------------<  165<H>  4.5   |  22  |  1.01    Ca    9.1      10 Sep 2017 02:52  Phos  3.4     09-10  Mg     1.9     09-10    TPro  7.1  /  Alb  3.7  /  TBili  0.3  /  DBili  x   /  AST  29  /  ALT  45  /  AlkPhos  98  09-10    LIVER FUNCTIONS - ( 10 Sep 2017 02:52 )  Alb: 3.7 g/dL / Pro: 7.1 g/dL / ALK PHOS: 98 U/L / ALT: 45 U/L RC / AST: 29 U/L / GGT: x           PTT - ( 10 Sep 2017 02:52 )  PTT:70.9 sec  Creatine Kinase, Serum: 191 U/L ( @ 06:53)  CKMB Units: 2.6 ng/mL ( @ 06:53)  Troponin T, Serum: <0.01 ng/mL ( @ 06:53)      *BLOOD GAS/ARTERIAL/MIXED/VENOUS  *LACTATE      HEALTH ISSUES - PROBLEM Dx:  Thrombus: Thrombus  Encounter for palliative care: Encounter for palliative care  Cardiac cachexia: Cardiac cachexia  Generalized abdominal pain: Generalized abdominal pain  Smoking: Smoking  Acute on chronic systolic heart failure, NYHA class 4: Acute on chronic systolic heart failure, NYHA class 4  Cardiogenic shock: Cardiogenic shock  CKD (chronic kidney disease) stage 3, GFR 30-59 ml/min: CKD (chronic kidney disease) stage 3, GFR 30-59 ml/min  Need for prophylactic measure: Need for prophylactic measure  Central sleep apnea secondary to congestive heart failure (CHF): Central sleep apnea secondary to congestive heart failure (CHF)  Pulmonary emphysema, unspecified emphysema type: Pulmonary emphysema, unspecified emphysema type  Left ventricular thrombus without MI: Left ventricular thrombus without MI  Acute systolic heart failure, first episode: Acute systolic heart failure, first episode  Coronary artery disease: Coronary artery disease  Smoker: Smoker  Acute systolic congestive heart failure: Acute systolic congestive heart failure  COPD (chronic obstructive pulmonary disease): COPD (chronic obstructive pulmonary disease)  Bilious vomiting with nausea: Bilious vomiting with nausea  Abdominal pain, unspecified location: Abdominal pain, unspecified location  Other chest pain: Other chest pain        HEALTH ISSUES - R/O PROBLEM Dx:      Ruben Walsh, CCU NP   #

## 2017-09-10 NOTE — CHART NOTE - NSCHARTNOTEFT_GEN_A_CORE
====================  NEW EVENTS:  ====================  No new events    ====================  SUMMARY:  ====================  This is a 62 year old man with a NIDCM LVEF 10-15%, ACC/AHA stage D with NYHA class IV symptoms admitted with cardiogenic shock stabilized with afterload reduction. Now on  milrinone. LVAD likely Tuesday.     ====================  VITALS:  ====================    ICU Vital Signs Last 24 Hrs  T(C): 36.8 (10 Sep 2017 19:00), Max: 36.8 (10 Sep 2017 00:00)  T(F): 98.2 (10 Sep 2017 19:00), Max: 98.2 (10 Sep 2017 00:00)  HR: 120 (10 Sep 2017 23:00) (102 - 123)  BP: 99/71 (10 Sep 2017 23:00) (86/61 - 110/90)  BP(mean): 80 (10 Sep 2017 23:00) (68 - 98)  ABP: --  ABP(mean): --  RR: 27 (10 Sep 2017 23:00) (20 - 223)  SpO2: 100% (10 Sep 2017 23:00) (93% - 100%)      I&O's Summary    09 Sep 2017 07:01  -  10 Sep 2017 07:00  --------------------------------------------------------  IN: 1023.2 mL / OUT: 2105 mL / NET: -1081.8 mL    10 Sep 2017 07:01  -  10 Sep 2017 23:53  --------------------------------------------------------  IN: 945.6 mL / OUT: 1035 mL / NET: -89.4 mL    ====================  LABS:  ====================                          12.8   5.9   )-----------( 168      ( 10 Sep 2017 02:52 )             38.1     09-10    136  |  101  |  23  ----------------------------<  165<H>  4.5   |  22  |  1.01    Ca    9.1      10 Sep 2017 02:52  Phos  3.4     09-10  Mg     1.9     09-10    TPro  7.1  /  Alb  3.7  /  TBili  0.3  /  DBili  x   /  AST  29  /  ALT  45  /  AlkPhos  98  09-10    PTT - ( 10 Sep 2017 15:26 )  PTT:99.4 sec        ====================  PLAN:  ====================  - c/w milirone, Hydralazine 50TID, Lipitor 20  - NPO past midnight for colonscopy.

## 2017-09-11 PROBLEM — Z00.00 ENCOUNTER FOR PREVENTIVE HEALTH EXAMINATION: Status: ACTIVE | Noted: 2017-09-11

## 2017-09-11 LAB
ALBUMIN SERPL ELPH-MCNC: 3.7 G/DL — SIGNIFICANT CHANGE UP (ref 3.3–5)
ALP SERPL-CCNC: 97 U/L — SIGNIFICANT CHANGE UP (ref 40–120)
ALT FLD-CCNC: 63 U/L RC — HIGH (ref 10–45)
ANION GAP SERPL CALC-SCNC: 14 MMOL/L — SIGNIFICANT CHANGE UP (ref 5–17)
APTT BLD: 89.6 SEC — HIGH (ref 27.5–37.4)
AST SERPL-CCNC: 50 U/L — HIGH (ref 10–40)
BASOPHILS # BLD AUTO: 0.1 K/UL — SIGNIFICANT CHANGE UP (ref 0–0.2)
BASOPHILS NFR BLD AUTO: 1.5 % — SIGNIFICANT CHANGE UP (ref 0–2)
BILIRUB SERPL-MCNC: 0.6 MG/DL — SIGNIFICANT CHANGE UP (ref 0.2–1.2)
BUN SERPL-MCNC: 19 MG/DL — SIGNIFICANT CHANGE UP (ref 7–23)
CALCIUM SERPL-MCNC: 9.6 MG/DL — SIGNIFICANT CHANGE UP (ref 8.4–10.5)
CHLORIDE SERPL-SCNC: 98 MMOL/L — SIGNIFICANT CHANGE UP (ref 96–108)
CO2 SERPL-SCNC: 22 MMOL/L — SIGNIFICANT CHANGE UP (ref 22–31)
CREAT SERPL-MCNC: 0.86 MG/DL — SIGNIFICANT CHANGE UP (ref 0.5–1.3)
EOSINOPHIL # BLD AUTO: 0.2 K/UL — SIGNIFICANT CHANGE UP (ref 0–0.5)
EOSINOPHIL NFR BLD AUTO: 3 % — SIGNIFICANT CHANGE UP (ref 0–6)
GLUCOSE SERPL-MCNC: 101 MG/DL — HIGH (ref 70–99)
HCT VFR BLD CALC: 39.4 % — SIGNIFICANT CHANGE UP (ref 39–50)
HGB BLD-MCNC: 13.5 G/DL — SIGNIFICANT CHANGE UP (ref 13–17)
INR BLD: 1.17 RATIO — HIGH (ref 0.88–1.16)
LYMPHOCYTES # BLD AUTO: 1.9 K/UL — SIGNIFICANT CHANGE UP (ref 1–3.3)
LYMPHOCYTES # BLD AUTO: 31.7 % — SIGNIFICANT CHANGE UP (ref 13–44)
MAGNESIUM SERPL-MCNC: 2 MG/DL — SIGNIFICANT CHANGE UP (ref 1.6–2.6)
MCHC RBC-ENTMCNC: 34.1 PG — HIGH (ref 27–34)
MCHC RBC-ENTMCNC: 34.2 GM/DL — SIGNIFICANT CHANGE UP (ref 32–36)
MCV RBC AUTO: 99.6 FL — SIGNIFICANT CHANGE UP (ref 80–100)
MONOCYTES # BLD AUTO: 0.6 K/UL — SIGNIFICANT CHANGE UP (ref 0–0.9)
MONOCYTES NFR BLD AUTO: 9.9 % — SIGNIFICANT CHANGE UP (ref 2–14)
NEUTROPHILS # BLD AUTO: 3.2 K/UL — SIGNIFICANT CHANGE UP (ref 1.8–7.4)
NEUTROPHILS NFR BLD AUTO: 54 % — SIGNIFICANT CHANGE UP (ref 43–77)
PHOSPHATE SERPL-MCNC: 3.6 MG/DL — SIGNIFICANT CHANGE UP (ref 2.5–4.5)
PLATELET # BLD AUTO: 159 K/UL — SIGNIFICANT CHANGE UP (ref 150–400)
POTASSIUM SERPL-MCNC: 5 MMOL/L — SIGNIFICANT CHANGE UP (ref 3.5–5.3)
POTASSIUM SERPL-SCNC: 5 MMOL/L — SIGNIFICANT CHANGE UP (ref 3.5–5.3)
PROT SERPL-MCNC: 7.4 G/DL — SIGNIFICANT CHANGE UP (ref 6–8.3)
PROTHROM AB SERPL-ACNC: 12.8 SEC — HIGH (ref 9.8–12.7)
RBC # BLD: 3.96 M/UL — LOW (ref 4.2–5.8)
RBC # FLD: 14.3 % — SIGNIFICANT CHANGE UP (ref 10.3–14.5)
SODIUM SERPL-SCNC: 134 MMOL/L — LOW (ref 135–145)
WBC # BLD: 6 K/UL — SIGNIFICANT CHANGE UP (ref 3.8–10.5)
WBC # FLD AUTO: 6 K/UL — SIGNIFICANT CHANGE UP (ref 3.8–10.5)

## 2017-09-11 PROCEDURE — 99233 SBSQ HOSP IP/OBS HIGH 50: CPT | Mod: GC

## 2017-09-11 PROCEDURE — 74263 CT COLONOGRAPHY SCREENING: CPT | Mod: 26

## 2017-09-11 RX ORDER — MUPIROCIN 20 MG/G
1 OINTMENT TOPICAL ONCE
Qty: 0 | Refills: 0 | Status: COMPLETED | OUTPATIENT
Start: 2017-09-11 | End: 2017-09-12

## 2017-09-11 RX ORDER — CIPROFLOXACIN LACTATE 400MG/40ML
400 VIAL (ML) INTRAVENOUS ONCE
Qty: 0 | Refills: 0 | Status: DISCONTINUED | OUTPATIENT
Start: 2017-09-11 | End: 2017-09-12

## 2017-09-11 RX ORDER — CHLORHEXIDINE GLUCONATE 213 G/1000ML
1 SOLUTION TOPICAL ONCE
Qty: 0 | Refills: 0 | Status: COMPLETED | OUTPATIENT
Start: 2017-09-11 | End: 2017-09-12

## 2017-09-11 RX ORDER — VANCOMYCIN HCL 1 G
1000 VIAL (EA) INTRAVENOUS ONCE
Qty: 0 | Refills: 0 | Status: DISCONTINUED | OUTPATIENT
Start: 2017-09-11 | End: 2017-09-12

## 2017-09-11 RX ORDER — CHLORHEXIDINE GLUCONATE 213 G/1000ML
30 SOLUTION TOPICAL ONCE
Qty: 0 | Refills: 0 | Status: COMPLETED | OUTPATIENT
Start: 2017-09-11 | End: 2017-09-12

## 2017-09-11 RX ORDER — FLUCONAZOLE 150 MG/1
400 TABLET ORAL ONCE
Qty: 0 | Refills: 0 | Status: DISCONTINUED | OUTPATIENT
Start: 2017-09-11 | End: 2017-09-12

## 2017-09-11 RX ORDER — BUMETANIDE 0.25 MG/ML
1 INJECTION INTRAMUSCULAR; INTRAVENOUS ONCE
Qty: 0 | Refills: 0 | Status: COMPLETED | OUTPATIENT
Start: 2017-09-11 | End: 2017-09-11

## 2017-09-11 RX ADMIN — MILRINONE LACTATE 6.75 MICROGRAM(S)/KG/MIN: 1 INJECTION, SOLUTION INTRAVENOUS at 06:31

## 2017-09-11 RX ADMIN — MILRINONE LACTATE 6.75 MICROGRAM(S)/KG/MIN: 1 INJECTION, SOLUTION INTRAVENOUS at 22:00

## 2017-09-11 RX ADMIN — Medication 1 GRAM(S): at 05:27

## 2017-09-11 RX ADMIN — HEPARIN SODIUM 1000 UNIT(S)/HR: 5000 INJECTION INTRAVENOUS; SUBCUTANEOUS at 06:29

## 2017-09-11 RX ADMIN — ATORVASTATIN CALCIUM 20 MILLIGRAM(S): 80 TABLET, FILM COATED ORAL at 21:59

## 2017-09-11 RX ADMIN — BUMETANIDE 1 MILLIGRAM(S): 0.25 INJECTION INTRAMUSCULAR; INTRAVENOUS at 17:01

## 2017-09-11 RX ADMIN — BUDESONIDE AND FORMOTEROL FUMARATE DIHYDRATE 2 PUFF(S): 160; 4.5 AEROSOL RESPIRATORY (INHALATION) at 09:49

## 2017-09-11 RX ADMIN — Medication 50 MILLIGRAM(S): at 21:58

## 2017-09-11 RX ADMIN — Medication 1 GRAM(S): at 15:00

## 2017-09-11 RX ADMIN — Medication 1 TABLET(S): at 16:01

## 2017-09-11 RX ADMIN — TIOTROPIUM BROMIDE 1 CAPSULE(S): 18 CAPSULE ORAL; RESPIRATORY (INHALATION) at 11:47

## 2017-09-11 RX ADMIN — Medication 50 MILLIGRAM(S): at 05:26

## 2017-09-11 RX ADMIN — Medication 50 MILLIGRAM(S): at 16:01

## 2017-09-11 RX ADMIN — Medication 1 GRAM(S): at 17:02

## 2017-09-11 RX ADMIN — MILRINONE LACTATE 6.75 MICROGRAM(S)/KG/MIN: 1 INJECTION, SOLUTION INTRAVENOUS at 16:01

## 2017-09-11 RX ADMIN — PANTOPRAZOLE SODIUM 40 MILLIGRAM(S): 20 TABLET, DELAYED RELEASE ORAL at 17:02

## 2017-09-11 RX ADMIN — PANTOPRAZOLE SODIUM 40 MILLIGRAM(S): 20 TABLET, DELAYED RELEASE ORAL at 05:26

## 2017-09-11 NOTE — CHART NOTE - NSCHARTNOTEFT_GEN_A_CORE
TRANSFER NOTE:    The patient is a 62 year old man with a NIDCM LVEF 10-15%, ACC/AHA stage D with NYHA class IV symptoms admitted with cardiogenic shock stabilized with afterload reduction. Now on milrinone gtt and hydralazine. The patient had a RHC which showed CO 1.15 and CI of 0.63, and was medically optimized for LVAD in the CCU. It was noted on 9/7 that the patient has severe MR on TTE. The patient has received a virtual colonoscopy today as per GI recs as part of his LVAS work up. The patient has been extensively followed by the HF team and is scheduled for an LVAD on Tuesday, and hence is being transferred to the CTICU ahead of his procedure.    TO DO:  [ ] f/u virtual colonoscopy results  [ ] Plan for LVAD tomorrow.

## 2017-09-11 NOTE — PROGRESS NOTE ADULT - PROBLEM SELECTOR PLAN 2
on milrinone , hydralazine, and heparin infusion: Chest x-ray with worsening chf, but no indication of pneumonia as of yet: he is afebrile and his leucocytes are normal.

## 2017-09-11 NOTE — PROGRESS NOTE ADULT - SUBJECTIVE AND OBJECTIVE BOX
24H hour events:   No acute events in last 24 hours. Patient seen in bed, states feels well, improved since admission.   MEDICATIONS:  milrinone Infusion 0.375 MICROgram(s)/kG/Min IV Continuous <Continuous>  hydrALAZINE 50 milliGRAM(s) Oral every 8 hours  heparin  Infusion. 900 Unit(s)/Hr IV Continuous <Continuous>  heparin  Injectable 4500 Unit(s) IV Push every 6 hours PRN  heparin  Injectable 2000 Unit(s) IV Push every 6 hours PRN    ciprofloxacin   IVPB 400 milliGRAM(s) IV Intermittent once  fluconAZOLE IVPB 400 milliGRAM(s) IV Intermittent once  vancomycin  IVPB 1000 milliGRAM(s) IV Intermittent once    tiotropium 18 MICROgram(s) Capsule 1 Capsule(s) Inhalation daily  buDESOnide 160 MICROgram(s)/formoterol 4.5 MICROgram(s) Inhaler 2 Puff(s) Inhalation two times a day      pantoprazole    Tablet 40 milliGRAM(s) Oral two times a day before meals  sucralfate suspension 1 Gram(s) Oral four times a day  aluminum hydroxide/magnesium hydroxide/simethicone Suspension 30 milliLiter(s) Oral every 4 hours PRN    atorvastatin 20 milliGRAM(s) Oral at bedtime    multivitamin 1 Tablet(s) Oral daily  chlorhexidine 4% Liquid 1 Application(s) Topical once  chlorhexidine 0.12% Liquid 30 milliLiter(s) Swish and Spit once  mupirocin 2% Ointment 1 Application(s) Topical once        PHYSICAL EXAM:  T(C): 36.4 (09-11-17 @ 16:05), Max: 36.9 (09-11-17 @ 08:00)  HR: 112 (09-11-17 @ 17:00) (108 - 123)  BP: 106/80 (09-11-17 @ 17:00) (88/65 - 108/90)  RR: 18 (09-11-17 @ 17:00) (18 - 33)  SpO2: 99% (09-11-17 @ 16:05) (97% - 100%)  Wt(kg): --  I&O's Summary    10 Sep 2017 07:01  -  11 Sep 2017 07:00  --------------------------------------------------------  IN: 1063.2 mL / OUT: 1285 mL / NET: -221.8 mL    11 Sep 2017 07:01  -  11 Sep 2017 17:49  --------------------------------------------------------  IN: 348 mL / OUT: 0 mL / NET: 348 mL        Appearance: Normal	  HEENT:   Normal oral mucosa  Lymphatic: No lymphadenopathy  Cardiovascular: Normal S1 S2,         No JVD,      No murmurs,      No edema  Respiratory: Lungs clear to auscultation	  Psychiatry: Mood & affect appropriate  Gastrointestinal:  Soft, Non-tender	  Skin: No rashes, No ecchymoses, No cyanosis	  Neurologic: Non-focal  Extremities: Normal range of motion, No clubbing, cyanosis   Vascular: warm extremities        LABS:	 	    CBC Full  -  ( 11 Sep 2017 03:34 )  WBC Count : 6.0 K/uL  Hemoglobin : 13.5 g/dL  Hematocrit : 39.4 %  Platelet Count - Automated : 159 K/uL  Mean Cell Volume : 99.6 fl  Mean Cell Hemoglobin : 34.1 pg  Mean Cell Hemoglobin Concentration : 34.2 gm/dL  Auto Neutrophil # : 3.2 K/uL  Auto Lymphocyte # : 1.9 K/uL  Auto Monocyte # : 0.6 K/uL  Auto Eosinophil # : 0.2 K/uL  Auto Basophil # : 0.1 K/uL  Auto Neutrophil % : 54.0 %  Auto Lymphocyte % : 31.7 %  Auto Monocyte % : 9.9 %  Auto Eosinophil % : 3.0 %  Auto Basophil % : 1.5 %    09-11    134<L>  |  98  |  19  ----------------------------<  101<H>  5.0   |  22  |  0.86  09-10    136  |  101  |  23  ----------------------------<  165<H>  4.5   |  22  |  1.01    Ca    9.6      11 Sep 2017 03:34  Ca    9.1      10 Sep 2017 02:52  Phos  3.6     09-11  Phos  3.4     09-10  Mg     2.0     09-11  Mg     1.9     09-10    TPro  7.4  /  Alb  3.7  /  TBili  0.6  /  DBili  x   /  AST  50<H>  /  ALT  63<H>  /  AlkPhos  97  09-11  TPro  7.1  /  Alb  3.7  /  TBili  0.3  /  DBili  x   /  AST  29  /  ALT  45  /  AlkPhos  98  09-10      proBNP:     TELEMETRY:

## 2017-09-11 NOTE — CHART NOTE - NSCHARTNOTEFT_GEN_A_CORE
Source: Patient [ X]    Family [ ]     other [X ] RN, medical record    Pt seen, reports being tired. Per Pt he hasn't eating in two days pending colonoscopy. Pt denies any nutritional complaints at  this time.     Per chart: Pt admitted with abdominal pain, hypotension and tachycardia. Pt with NICM, new onset acute systolic decompensated HF with EF of 10%, cardiogenic shock, and low output. Pt s/p TTE with mod/severe MR, ? LV thrombus. Plan for virtual colonoscopy today, and plan fr LVAD placement tomorrow.     Diet : NPO       Patient reports no GI distress at this time      Current Weight: 59.6kg. : 59.8kg. admission Wt: 66.1kg. Decrease noted, however Wt appears stable at this time. No edema.   % Weight Change    Pertinent Medications: MEDICATIONS  (STANDING):  pantoprazole    Tablet 40 milliGRAM(s) Oral two times a day before meals  sucralfate suspension 1 Gram(s) Oral four times a day  atorvastatin 20 milliGRAM(s) Oral at bedtime  tiotropium 18 MICROgram(s) Capsule 1 Capsule(s) Inhalation daily  multivitamin 1 Tablet(s) Oral daily  buDESOnide 160 MICROgram(s)/formoterol 4.5 MICROgram(s) Inhaler 2 Puff(s) Inhalation two times a day  milrinone Infusion 0.375 MICROgram(s)/kG/Min (6.75 mL/Hr) IV Continuous <Continuous>  hydrALAZINE 50 milliGRAM(s) Oral every 8 hours  heparin  Infusion. 900 Unit(s)/Hr (9 mL/Hr) IV Continuous <Continuous>    MEDICATIONS  (PRN):  aluminum hydroxide/magnesium hydroxide/simethicone Suspension 30 milliLiter(s) Oral every 4 hours PRN Dyspepsia  heparin  Injectable 4500 Unit(s) IV Push every 6 hours PRN For aPTT less than 40  heparin  Injectable 2000 Unit(s) IV Push every 6 hours PRN For aPTT between 40 - 57    Pertinent Labs:  Hgb/Hct:13.5/39.4, Na:134-low, K:5.0, Cl:98, BUN:19, Cr:0.86, Glucose:101-high, Ca:9.6, Total Protein:7.4, Albumin:3.7,  Total Bilirubin:0.6,  AlkPhos:97, AST:50-high, ALT:63-high, M.0, Phos:3.6,     Skin: no edema, skin intact     Estimated Needs:   [X ] no change since previous assessment  [ ] recalculated:       Previous Nutrition Diagnosis:     [ X] Increased Nutrient Needs and [ X] Malnutrition          Nutrition Diagnosis is [ X] ongoing        New Nutrition Diagnosis: [X ] not applicable    Recommend  1. Advance diet as medically feasible.   2. follow up with diet education as medically feasible     Monitoring and Evaluation:     [ X] PO intake [X ] Tolerance to diet prescription [ X] weights [X ] follow up per protocol    [X ] other: RD remains available Sarah Siegler RD. Pager #[phone] Source: Patient [ X]    Family [ ]     other [X ] RN, medical record    Pt seen, reports being tired. Per Pt he hasn't eating in two days pending colonoscopy. Pt denies any nutritional complaints at  this time.     Per chart: Pt admitted with abdominal pain, hypotension and tachycardia. Pt with NICM, new onset acute systolic decompensated HF with EF of 10%, cardiogenic shock, and low output. Pt s/p TTE with mod/severe MR, ? LV thrombus. Plan for virtual colonoscopy today, and plan fr LVAD placement tomorrow.     Diet : NPO        Patient reports no GI distress at this time      Current Weight: 59.6kg. : 59.8kg. admission Wt: 66.1kg. Decrease noted, however Wt appears stable at this time. No edema.   % Weight Change    Pertinent Medications: MEDICATIONS  (STANDING):  pantoprazole    Tablet 40 milliGRAM(s) Oral two times a day before meals  sucralfate suspension 1 Gram(s) Oral four times a day  atorvastatin 20 milliGRAM(s) Oral at bedtime  tiotropium 18 MICROgram(s) Capsule 1 Capsule(s) Inhalation daily  multivitamin 1 Tablet(s) Oral daily  buDESOnide 160 MICROgram(s)/formoterol 4.5 MICROgram(s) Inhaler 2 Puff(s) Inhalation two times a day  milrinone Infusion 0.375 MICROgram(s)/kG/Min (6.75 mL/Hr) IV Continuous <Continuous>  hydrALAZINE 50 milliGRAM(s) Oral every 8 hours  heparin  Infusion. 900 Unit(s)/Hr (9 mL/Hr) IV Continuous <Continuous>    MEDICATIONS  (PRN):  aluminum hydroxide/magnesium hydroxide/simethicone Suspension 30 milliLiter(s) Oral every 4 hours PRN Dyspepsia  heparin  Injectable 4500 Unit(s) IV Push every 6 hours PRN For aPTT less than 40  heparin  Injectable 2000 Unit(s) IV Push every 6 hours PRN For aPTT between 40 - 57    Pertinent Labs:  Hgb/Hct:13.5/39.4, Na:134-low, K:5.0, Cl:98, BUN:19, Cr:0.86, Glucose:101-high, Ca:9.6, Total Protein:7.4, Albumin:3.7,  Total Bilirubin:0.6,  AlkPhos:97, AST:50-high, ALT:63-high, M.0, Phos:3.6.     Skin: no edema, skin intact     Estimated Needs:   [X ] no change since previous assessment        Previous Nutrition Diagnosis:     [ X] Increased Nutrient Needs and [ X] Malnutrition          Nutrition Diagnosis is [ X] ongoing        New Nutrition Diagnosis: [X ] not applicable    Recommend  1. Advance diet as medically feasible.   2. follow up with diet education as medically feasible     Monitoring and Evaluation:     [ X] PO intake [X ] Tolerance to diet prescription [ X] weights [X ] follow up per protocol    [X ] other: RD remains available Sarah Siegler RD. Pager #[phone]

## 2017-09-11 NOTE — PROGRESS NOTE ADULT - ATTENDING COMMENTS
Briefly, 61 y/o Creole speaking male who presented with abdominal pain and was found to be in low output heart failure with cardiogenic shock (EF 10%), had LHC which was normal, initially on nipride then transitioned to milrinone 0.375 with improvement, currently INTERMACS 2 and reaching euvolemia scheduled for LVAD implant tomorrow. Also noted to have a LV thrombus initially not seen on cardiac MRI but on heparin gtt for that. Had virtual colonoscopy which was negative. Appears slightly overloaded today. Labs reviewed with mild transaminitis. Last venous sat 58% 9/7 c/w low CI (on milrinone 0.375). Last HD reviewed with notable mPAP 30s. TTE reviewed with EF 10%, LVEDD 6.7 cm, mod-severe TR, severe MR (posteriorly directed 2/2 tethered posterior leaflet). Currently stage D HF, NYHA class IV with INTERMACS 2 with persistent low output and mild hypervolemia.  - give bumex 1 mg today; goal net negative 1 L  - continue hydral 50 mg q8h  - can d/c lipitor as no benefit   - pt optimized for LVAD tomorrow; will likely need TV repair concurrently to avoid RV failure

## 2017-09-11 NOTE — PROGRESS NOTE ADULT - ASSESSMENT
62M NICM  (LVDD 6.7 cm) HFrEF, (LVEF 10%), ACC/AHA stage D with NYHA class IV symptoms, pw abd pain, elevated lactic acid. Initial RHC with depressed CI (0.6).  A-line, swan now removed.     ADHF  [id number],  /60  131 (9/11, standing) <-- 131 (9/10, bed wt) <-- 132.9 (9/8,standing) <-- 131 (9/8,bed)  - milrinone @ 0.375  hydralazine 50 q8  heparin gtt  - fluid overloaded on exam today, likely from virtual  c-scope prep  - was on lasix 40 IV QD last week, would give another dose , f/u final recs.       + LV thrombus, embolized  - heparin gtt    ?CAD  - atorvastatin 20mg    Abd pain  - on pantoprazole 40 BID,  -sucralfate      LVAD eval  - plan for LVAD tues

## 2017-09-11 NOTE — PROGRESS NOTE ADULT - SUBJECTIVE AND OBJECTIVE BOX
Patient is a 62y old  Male who presents with a chief complaint of SOB (25 Aug 2017 22:27)    pt complaining of pain in his abdomen  he says his breathing has been OK  he feels pretty weak       Any change in ROS:     MEDICATIONS  (STANDING):  pantoprazole    Tablet 40 milliGRAM(s) Oral two times a day before meals  sucralfate suspension 1 Gram(s) Oral four times a day  atorvastatin 20 milliGRAM(s) Oral at bedtime  tiotropium 18 MICROgram(s) Capsule 1 Capsule(s) Inhalation daily  multivitamin 1 Tablet(s) Oral daily  buDESOnide 160 MICROgram(s)/formoterol 4.5 MICROgram(s) Inhaler 2 Puff(s) Inhalation two times a day  milrinone Infusion 0.375 MICROgram(s)/kG/Min (6.75 mL/Hr) IV Continuous <Continuous>  hydrALAZINE 50 milliGRAM(s) Oral every 8 hours  heparin  Infusion. 900 Unit(s)/Hr (9 mL/Hr) IV Continuous <Continuous>    MEDICATIONS  (PRN):  aluminum hydroxide/magnesium hydroxide/simethicone Suspension 30 milliLiter(s) Oral every 4 hours PRN Dyspepsia  heparin  Injectable 4500 Unit(s) IV Push every 6 hours PRN For aPTT less than 40  heparin  Injectable 2000 Unit(s) IV Push every 6 hours PRN For aPTT between 40 - 57    Vital Signs Last 24 Hrs  T(C): 36.9 (11 Sep 2017 08:00), Max: 36.9 (11 Sep 2017 08:00)  T(F): 98.5 (11 Sep 2017 08:00), Max: 98.5 (11 Sep 2017 08:00)  HR: 116 (11 Sep 2017 10:00) (110 - 123)  BP: 93/75 (11 Sep 2017 10:00) (86/61 - 107/77)  BP(mean): 80 (11 Sep 2017 10:00) (65 - 96)  RR: 26 (11 Sep 2017 10:00) (20 - 223)  SpO2: 99% (11 Sep 2017 10:00) (94% - 100%)    I&O's Summary    10 Sep 2017 07:01  -  11 Sep 2017 07:00  --------------------------------------------------------  IN: 1063.2 mL / OUT: 1285 mL / NET: -221.8 mL    11 Sep 2017 07:01  -  11 Sep 2017 10:37  --------------------------------------------------------  IN: 50.4 mL / OUT: 0 mL / NET: 50.4 mL          Physical Exam:   GENERAL: looks weak  HEENT: ALONSO/   Atraumatic, Normocephalic  ENMT: No tonsillar erythema, exudates, or enlargement; Moist mucous membranes, Good dentition, No lesions  NECK: Supple, No JVD, Normal thyroid  CHEST/LUNG: poor aire ntry   CVS: Regular rate and rhythm; No murmurs, rubs, or gallops  GI: : Soft, Nontender, Nondistended; Bowel sounds present  NERVOUS SYSTEM:  Alert & Oriented X3  EXTREMITIES:  2+ Peripheral Pulses, No clubbing, cyanosis, or edema  LYMPH: No lymphadenopathy noted  SKIN: No rashes or lesions  ENDOCRINOLOGY: No Thyromegaly  PSYCH: Appropriate    Labs:  21                            13.5   6.0   )-----------( 159      ( 11 Sep 2017 03:34 )             39.4                         12.8   5.9   )-----------( 168      ( 10 Sep 2017 02:52 )             38.1                         13.3   5.5   )-----------( 157      ( 09 Sep 2017 19:24 )             41.2                         13.0   5.2   )-----------( 155      ( 09 Sep 2017 12:07 )             39.4                         12.4   5.8   )-----------( 138      ( 09 Sep 2017 02:14 )             37.9                         13.2   6.2   )-----------( 140      ( 08 Sep 2017 04:38 )             39.2     09-11    134<L>  |  98  |  19  ----------------------------<  101<H>  5.0   |  22  |  0.86  09-10    136  |  101  |  23  ----------------------------<  165<H>  4.5   |  22  |  1.01  09-09    132<L>  |  100  |  22  ----------------------------<  238<H>  4.5   |  20<L>  |  0.83  09-08    136  |  102  |  21  ----------------------------<  136<H>  4.3   |  22  |  0.81  09-07    136  |  99  |  22  ----------------------------<  172<H>  4.4   |  24  |  0.82    Ca    9.6      11 Sep 2017 03:34  Ca    9.1      10 Sep 2017 02:52  Phos  3.6     09-11  Phos  3.4     09-10  Mg     2.0     09-11  Mg     1.9     09-10    TPro  7.4  /  Alb  3.7  /  TBili  0.6  /  DBili  x   /  AST  50<H>  /  ALT  63<H>  /  AlkPhos  97  09-11  TPro  7.1  /  Alb  3.7  /  TBili  0.3  /  DBili  x   /  AST  29  /  ALT  45  /  AlkPhos  98  09-10  TPro  6.5  /  Alb  3.4  /  TBili  0.2  /  DBili  x   /  AST  34  /  ALT  49<H>  /  AlkPhos  98  09-09  TPro  6.6  /  Alb  3.3  /  TBili  0.3  /  DBili  x   /  AST  36  /  ALT  54<H>  /  AlkPhos  94  09-08  TPro  7.0  /  Alb  3.5  /  TBili  0.4  /  DBili  x   /  AST  41<H>  /  ALT  57<H>  /  AlkPhos  104  09-07    CAPILLARY BLOOD GLUCOSE          LIVER FUNCTIONS - ( 11 Sep 2017 03:34 )  Alb: 3.7 g/dL / Pro: 7.4 g/dL / ALK PHOS: 97 U/L / ALT: 63 U/L RC / AST: 50 U/L / GGT: x           PT/INR - ( 11 Sep 2017 06:06 )   PT: 12.8 sec;   INR: 1.17 ratio         PTT - ( 11 Sep 2017 06:06 )  PTT:89.6 sec    Cultures: Studies  Chest X-RAY  CT SCAN Chest   Venous Dopplers: LE:   CT Abdomen  Others        < from: Xray Chest 1 View AP -PORTABLE-Routine (09.10.17 @ 08:51) >    EXAM:  CHEST PORTABLE ROUTINE                            PROCEDURE DATE:  09/10/2017            INTERPRETATION:  EXAMINATION: CHEST PORTABLE ROUTINE    CLINICAL INDICATION: [facility]    TECHNIQUE: Single portable view of the chest was obtained.    COMPARISON: 9/9/2017.    FINDINGS:     There is cardiomegaly, similar to the prior studies. There has been   interval progression of pulmonary vascular congestion. There are small   bilateral pleural effusion.    IMPRESSION:   Worsening pulmonary vascular congestion. Small bilateral pleural   effusions.                    JONATHAN JIMENEZ M.D., ATTENDING RADIOLOGIST  This document has been electronically signed. Sep 11 2017 10:07AM    < end of copied text >

## 2017-09-11 NOTE — PROGRESS NOTE ADULT - SUBJECTIVE AND OBJECTIVE BOX
CHIEF COMPLAINT: SOB    Interval Events: No events ON. Only able to consume half of bowel prep. Plan for virtual colonoscopy today.     REVIEW OF SYSTEMS    CONSTITUTIONAL:  Denies f nvd chills  HEENT:  Eyes:  Denies visual loss, blurred vision, double vision or scleral icterus. Ears, Nose, Throat:  Denies hearing loss, sneezing, congestion, runny nose or sore throat.  SKIN:  Denies rash or itching.  CARDIOVASCULAR:  Denies chest pain, ERICK  RESPIRATORY:  Denies shortness of breath, cough or sputum.  GASTROINTESTINAL:  Denies anorexia, nausea, vomiting or diarrhea. No abdominal pain or blood.  GENITOURINARY:  Denies any hematuria, dysuria  NEUROLOGICAL:  Denies headache, dizziness, syncope, paralysis, ataxia, numbness or tingling in the extremities. No change in bowel or bladder control.  MUSCULOSKELETAL:  Denies muscle, back pain, joint pain or stiffness.  HEMATOLOGIC:  Denies anemia, bleeding or bruising.  LYMPHATICS:  Denies enlarged nodes.   PSYCHIATRIC:  Denies history of depression or anxiety.  ENDOCRINOLOGIC:  Denies reports of sweating, cold or heat intolerance. No polyuria or polydipsia.  ALLERGIES:  Denies history of asthma, hives, eczema or rhinitis.    OBJECTIVE:  ICU Vital Signs Last 24 Hrs  T(C): 36.7 (11 Sep 2017 05:00), Max: 36.8 (10 Sep 2017 17:00)  T(F): 98.1 (11 Sep 2017 05:00), Max: 98.2 (10 Sep 2017 17:00)  HR: 114 (11 Sep 2017 07:00) (110 - 123)  BP: 97/71 (11 Sep 2017 07:00) (86/61 - 110/90)  BP(mean): 87 (11 Sep 2017 07:00) (65 - 98)  ABP: --  ABP(mean): --  RR: 21 (11 Sep 2017 07:00) (20 - 223)  SpO2: 97% (11 Sep 2017 07:00) (93% - 100%)        09-10 @ 07:01  -  09-11 @ 07:00  --------------------------------------------------------  IN: 1063.2 mL / OUT: 1285 mL / NET: -221.8 mL      CAPILLARY BLOOD GLUCOSE    PHYSICAL EXAM:    GENERAL: Comfortable, no acute distress   HEAD:  Normocephalic, atraumatic  EYES: EOMI, PERRLA  HEENT: Moist mucous membranes  NECK: Supple, No JVD  NERVOUS SYSTEM:  CN 2-12 intact b/l. Alert & Oriented X3, Motor Strength 5/5 B/L upper and lower extremities. Sensation intact B/L  CHEST/LUNG: Clear to auscultation bilaterally  HEART: Distant heart sounds. No mrg   ABDOMEN: Soft, Nontender, Nondistended, Bowel sounds present  EXTREMITIES:   No clubbing, cyanosis, or edema  MUSCULOSKELETAL: No muscle tenderness, no joint tenderness  SKIN: warm and dry, no rash       HOSPITAL MEDICATIONS:  MEDICATIONS  (STANDING):  pantoprazole    Tablet 40 milliGRAM(s) Oral two times a day before meals  sucralfate suspension 1 Gram(s) Oral four times a day  atorvastatin 20 milliGRAM(s) Oral at bedtime  tiotropium 18 MICROgram(s) Capsule 1 Capsule(s) Inhalation daily  multivitamin 1 Tablet(s) Oral daily  buDESOnide 160 MICROgram(s)/formoterol 4.5 MICROgram(s) Inhaler 2 Puff(s) Inhalation two times a day  milrinone Infusion 0.375 MICROgram(s)/kG/Min (6.75 mL/Hr) IV Continuous <Continuous>  hydrALAZINE 50 milliGRAM(s) Oral every 8 hours  heparin  Infusion. 900 Unit(s)/Hr (9 mL/Hr) IV Continuous <Continuous>    MEDICATIONS  (PRN):  aluminum hydroxide/magnesium hydroxide/simethicone Suspension 30 milliLiter(s) Oral every 4 hours PRN Dyspepsia  heparin  Injectable 4500 Unit(s) IV Push every 6 hours PRN For aPTT less than 40  heparin  Injectable 2000 Unit(s) IV Push every 6 hours PRN For aPTT between 40 - 57      LABS:                        13.5   6.0   )-----------( 159      ( 11 Sep 2017 03:34 )             39.4     09-11    134<L>  |  98  |  19  ----------------------------<  101<H>  5.0   |  22  |  0.86    Ca    9.6      11 Sep 2017 03:34  Phos  3.6     09-11  Mg     2.0     09-11    TPro  7.4  /  Alb  3.7  /  TBili  0.6  /  DBili  x   /  AST  50<H>  /  ALT  63<H>  /  AlkPhos  97  09-11    PT/INR - ( 11 Sep 2017 06:06 )   PT: 12.8 sec;   INR: 1.17 ratio         PTT - ( 11 Sep 2017 06:06 )  PTT:89.6 sec          MICROBIOLOGY:     RADIOLOGY:  [ ] Reviewed and interpreted by me    EKG:

## 2017-09-11 NOTE — PROGRESS NOTE ADULT - PROBLEM SELECTOR PLAN 1
EF 10-15% on Milrinone.  C/w milrinone gtt, hydralazine, heparin gtt.  Scheduled for LVAD 9/12  NPO past midnight for colonoscopy

## 2017-09-11 NOTE — PROGRESS NOTE ADULT - ASSESSMENT
62 year old man with a NIDCM LVEF 10-15%, ACC/AHA stage D with NYHA class IV symptoms admitted with cardiogenic shock stabilized with afterload reduction. Now on  milrinone. LVAD likely Tuesday. S/P LVAD education, plan for virtual Colonoscopy today.

## 2017-09-12 ENCOUNTER — APPOINTMENT (OUTPATIENT)
Dept: CARDIOTHORACIC SURGERY | Facility: HOSPITAL | Age: 62
End: 2017-09-12
Payer: MEDICAID

## 2017-09-12 ENCOUNTER — RESULT REVIEW (OUTPATIENT)
Age: 62
End: 2017-09-12

## 2017-09-12 LAB
ALBUMIN SERPL ELPH-MCNC: 3.7 G/DL — SIGNIFICANT CHANGE UP (ref 3.3–5)
ALBUMIN SERPL ELPH-MCNC: 3.9 G/DL — SIGNIFICANT CHANGE UP (ref 3.3–5)
ALP SERPL-CCNC: 57 U/L — SIGNIFICANT CHANGE UP (ref 40–120)
ALP SERPL-CCNC: 97 U/L — SIGNIFICANT CHANGE UP (ref 40–120)
ALT FLD-CCNC: 35 U/L RC — SIGNIFICANT CHANGE UP (ref 10–45)
ALT FLD-CCNC: 52 U/L RC — HIGH (ref 10–45)
ANION GAP SERPL CALC-SCNC: 13 MMOL/L — SIGNIFICANT CHANGE UP (ref 5–17)
ANION GAP SERPL CALC-SCNC: 18 MMOL/L — HIGH (ref 5–17)
APTT BLD: 35.2 SEC — SIGNIFICANT CHANGE UP (ref 27.5–37.4)
APTT BLD: 58.7 SEC — HIGH (ref 27.5–37.4)
APTT BLD: 59.3 SEC — HIGH (ref 27.5–37.4)
AST SERPL-CCNC: 34 U/L — SIGNIFICANT CHANGE UP (ref 10–40)
AST SERPL-CCNC: 67 U/L — HIGH (ref 10–40)
BASE EXCESS BLDV CALC-SCNC: -18 MMOL/L — LOW (ref -2–2)
BASE EXCESS BLDV CALC-SCNC: -5.6 MMOL/L — LOW (ref -2–2)
BASE EXCESS BLDV CALC-SCNC: -6.7 MMOL/L — LOW (ref -2–2)
BASE EXCESS BLDV CALC-SCNC: -6.8 MMOL/L — LOW (ref -2–2)
BASOPHILS # BLD AUTO: 0.1 K/UL — SIGNIFICANT CHANGE UP (ref 0–0.2)
BASOPHILS # BLD AUTO: 0.3 K/UL — HIGH (ref 0–0.2)
BASOPHILS NFR BLD AUTO: 1.3 % — SIGNIFICANT CHANGE UP (ref 0–2)
BASOPHILS NFR BLD AUTO: 1.9 % — SIGNIFICANT CHANGE UP (ref 0–2)
BILIRUB SERPL-MCNC: 0.4 MG/DL — SIGNIFICANT CHANGE UP (ref 0.2–1.2)
BILIRUB SERPL-MCNC: 0.7 MG/DL — SIGNIFICANT CHANGE UP (ref 0.2–1.2)
BUN SERPL-MCNC: 23 MG/DL — SIGNIFICANT CHANGE UP (ref 7–23)
BUN SERPL-MCNC: 27 MG/DL — HIGH (ref 7–23)
CA-I SERPL-SCNC: 1.23 MMOL/L — SIGNIFICANT CHANGE UP (ref 1.12–1.3)
CALCIUM SERPL-MCNC: 9.4 MG/DL — SIGNIFICANT CHANGE UP (ref 8.4–10.5)
CALCIUM SERPL-MCNC: 9.4 MG/DL — SIGNIFICANT CHANGE UP (ref 8.4–10.5)
CHLORIDE BLDV-SCNC: 103 MMOL/L — SIGNIFICANT CHANGE UP (ref 96–108)
CHLORIDE SERPL-SCNC: 102 MMOL/L — SIGNIFICANT CHANGE UP (ref 96–108)
CHLORIDE SERPL-SCNC: 98 MMOL/L — SIGNIFICANT CHANGE UP (ref 96–108)
CO2 BLDV-SCNC: 18 MMOL/L — LOW (ref 22–30)
CO2 BLDV-SCNC: 20 MMOL/L — LOW (ref 22–30)
CO2 BLDV-SCNC: 21 MMOL/L — LOW (ref 22–30)
CO2 BLDV-SCNC: 22 MMOL/L — SIGNIFICANT CHANGE UP (ref 22–30)
CO2 SERPL-SCNC: 18 MMOL/L — LOW (ref 22–31)
CO2 SERPL-SCNC: 23 MMOL/L — SIGNIFICANT CHANGE UP (ref 22–31)
CREAT SERPL-MCNC: 0.95 MG/DL — SIGNIFICANT CHANGE UP (ref 0.5–1.3)
CREAT SERPL-MCNC: 1.07 MG/DL — SIGNIFICANT CHANGE UP (ref 0.5–1.3)
EOSINOPHIL # BLD AUTO: 0 K/UL — SIGNIFICANT CHANGE UP (ref 0–0.5)
EOSINOPHIL # BLD AUTO: 0.2 K/UL — SIGNIFICANT CHANGE UP (ref 0–0.5)
EOSINOPHIL NFR BLD AUTO: 0.4 % — SIGNIFICANT CHANGE UP (ref 0–6)
EOSINOPHIL NFR BLD AUTO: 4.5 % — SIGNIFICANT CHANGE UP (ref 0–6)
FIBRINOGEN PPP-MCNC: 442 MG/DL — SIGNIFICANT CHANGE UP (ref 310–510)
FIBRINOGEN PPP-MCNC: 509 MG/DL — SIGNIFICANT CHANGE UP (ref 310–510)
GAS PNL BLDA: SIGNIFICANT CHANGE UP
GAS PNL BLDV: 134 MMOL/L — LOW (ref 136–145)
GAS PNL BLDV: SIGNIFICANT CHANGE UP
GAS PNL BLDV: SIGNIFICANT CHANGE UP
GLUCOSE BLDV-MCNC: 214 MG/DL — HIGH (ref 70–99)
GLUCOSE SERPL-MCNC: 119 MG/DL — HIGH (ref 70–99)
GLUCOSE SERPL-MCNC: 153 MG/DL — HIGH (ref 70–99)
HCO3 BLDV-SCNC: 16 MMOL/L — LOW (ref 21–29)
HCO3 BLDV-SCNC: 18 MMOL/L — LOW (ref 21–29)
HCO3 BLDV-SCNC: 19 MMOL/L — LOW (ref 21–29)
HCO3 BLDV-SCNC: 20 MMOL/L — LOW (ref 21–29)
HCT VFR BLD CALC: 27.6 % — LOW (ref 39–50)
HCT VFR BLD CALC: 27.9 % — LOW (ref 39–50)
HCT VFR BLD CALC: 39.2 % — SIGNIFICANT CHANGE UP (ref 39–50)
HCT VFR BLDA CALC: 28 % — LOW (ref 39–50)
HGB BLD CALC-MCNC: 9.2 G/DL — LOW (ref 13–17)
HGB BLD-MCNC: 13.2 G/DL — SIGNIFICANT CHANGE UP (ref 13–17)
HGB BLD-MCNC: 9.1 G/DL — LOW (ref 13–17)
HGB BLD-MCNC: 9.3 G/DL — LOW (ref 13–17)
HOROWITZ INDEX BLDV+IHG-RTO: 100 — SIGNIFICANT CHANGE UP
HOROWITZ INDEX BLDV+IHG-RTO: 40 — SIGNIFICANT CHANGE UP
HOROWITZ INDEX BLDV+IHG-RTO: 40 — SIGNIFICANT CHANGE UP
INR BLD: 1.15 RATIO — SIGNIFICANT CHANGE UP (ref 0.88–1.16)
INR BLD: 1.31 RATIO — HIGH (ref 0.88–1.16)
INR BLD: 1.35 RATIO — HIGH (ref 0.88–1.16)
LACTATE BLDV-MCNC: 4.8 MMOL/L — CRITICAL HIGH (ref 0.7–2)
LYMPHOCYTES # BLD AUTO: 0.4 K/UL — LOW (ref 1–3.3)
LYMPHOCYTES # BLD AUTO: 1.7 K/UL — SIGNIFICANT CHANGE UP (ref 1–3.3)
LYMPHOCYTES # BLD AUTO: 2.5 % — LOW (ref 13–44)
LYMPHOCYTES # BLD AUTO: 31.9 % — SIGNIFICANT CHANGE UP (ref 13–44)
MCHC RBC-ENTMCNC: 33 GM/DL — SIGNIFICANT CHANGE UP (ref 32–36)
MCHC RBC-ENTMCNC: 33.1 PG — SIGNIFICANT CHANGE UP (ref 27–34)
MCHC RBC-ENTMCNC: 33.3 PG — SIGNIFICANT CHANGE UP (ref 27–34)
MCHC RBC-ENTMCNC: 33.4 GM/DL — SIGNIFICANT CHANGE UP (ref 32–36)
MCHC RBC-ENTMCNC: 33.5 PG — SIGNIFICANT CHANGE UP (ref 27–34)
MCHC RBC-ENTMCNC: 33.7 GM/DL — SIGNIFICANT CHANGE UP (ref 32–36)
MCV RBC AUTO: 100 FL — SIGNIFICANT CHANGE UP (ref 80–100)
MCV RBC AUTO: 100 FL — SIGNIFICANT CHANGE UP (ref 80–100)
MCV RBC AUTO: 99 FL — SIGNIFICANT CHANGE UP (ref 80–100)
MONOCYTES # BLD AUTO: 0.6 K/UL — SIGNIFICANT CHANGE UP (ref 0–0.9)
MONOCYTES # BLD AUTO: 1.2 K/UL — HIGH (ref 0–0.9)
MONOCYTES NFR BLD AUTO: 10.3 % — SIGNIFICANT CHANGE UP (ref 2–14)
MONOCYTES NFR BLD AUTO: 8.6 % — SIGNIFICANT CHANGE UP (ref 2–14)
NEUTROPHILS # BLD AUTO: 12.2 K/UL — HIGH (ref 1.8–7.4)
NEUTROPHILS # BLD AUTO: 2.8 K/UL — SIGNIFICANT CHANGE UP (ref 1.8–7.4)
NEUTROPHILS NFR BLD AUTO: 52 % — SIGNIFICANT CHANGE UP (ref 43–77)
NEUTROPHILS NFR BLD AUTO: 86.6 % — HIGH (ref 43–77)
OTHER CELLS CSF MANUAL: 9 ML/DL — LOW (ref 18–22)
PCO2 BLDV: 38 MMHG — SIGNIFICANT CHANGE UP (ref 35–50)
PCO2 BLDV: 38 MMHG — SIGNIFICANT CHANGE UP (ref 35–50)
PCO2 BLDV: 53 MMHG — HIGH (ref 35–50)
PCO2 BLDV: 81 MMHG — HIGH (ref 35–50)
PH BLDV: 6.92 — CRITICAL LOW (ref 7.35–7.45)
PH BLDV: 7.22 — LOW (ref 7.35–7.45)
PH BLDV: 7.3 — LOW (ref 7.35–7.45)
PH BLDV: 7.32 — LOW (ref 7.35–7.45)
PLATELET # BLD AUTO: 166 K/UL — SIGNIFICANT CHANGE UP (ref 150–400)
PLATELET # BLD AUTO: 177 K/UL — SIGNIFICANT CHANGE UP (ref 150–400)
PLATELET # BLD AUTO: 182 K/UL — SIGNIFICANT CHANGE UP (ref 150–400)
PO2 BLDV: 42 MMHG — SIGNIFICANT CHANGE UP (ref 25–45)
PO2 BLDV: 42 MMHG — SIGNIFICANT CHANGE UP (ref 25–45)
PO2 BLDV: 47 MMHG — HIGH (ref 25–45)
PO2 BLDV: 68 MMHG — HIGH (ref 25–45)
POTASSIUM BLDV-SCNC: 4.2 MMOL/L — SIGNIFICANT CHANGE UP (ref 3.5–5)
POTASSIUM SERPL-MCNC: 3.9 MMOL/L — SIGNIFICANT CHANGE UP (ref 3.5–5.3)
POTASSIUM SERPL-MCNC: 4.3 MMOL/L — SIGNIFICANT CHANGE UP (ref 3.5–5.3)
POTASSIUM SERPL-SCNC: 3.9 MMOL/L — SIGNIFICANT CHANGE UP (ref 3.5–5.3)
POTASSIUM SERPL-SCNC: 4.3 MMOL/L — SIGNIFICANT CHANGE UP (ref 3.5–5.3)
PROT SERPL-MCNC: 6 G/DL — SIGNIFICANT CHANGE UP (ref 6–8.3)
PROT SERPL-MCNC: 7.3 G/DL — SIGNIFICANT CHANGE UP (ref 6–8.3)
PROTHROM AB SERPL-ACNC: 12.5 SEC — SIGNIFICANT CHANGE UP (ref 9.8–12.7)
PROTHROM AB SERPL-ACNC: 14.4 SEC — HIGH (ref 9.8–12.7)
PROTHROM AB SERPL-ACNC: 14.8 SEC — HIGH (ref 9.8–12.7)
RBC # BLD: 2.75 M/UL — LOW (ref 4.2–5.8)
RBC # BLD: 2.78 M/UL — LOW (ref 4.2–5.8)
RBC # BLD: 3.96 M/UL — LOW (ref 4.2–5.8)
RBC # FLD: 13.7 % — SIGNIFICANT CHANGE UP (ref 10.3–14.5)
RBC # FLD: 13.9 % — SIGNIFICANT CHANGE UP (ref 10.3–14.5)
RBC # FLD: 14.2 % — SIGNIFICANT CHANGE UP (ref 10.3–14.5)
SAO2 % BLDV: 60 % — LOW (ref 67–88)
SAO2 % BLDV: 69 % — SIGNIFICANT CHANGE UP (ref 67–88)
SAO2 % BLDV: 75 % — SIGNIFICANT CHANGE UP (ref 67–88)
SAO2 % BLDV: 89 % — HIGH (ref 67–88)
SODIUM SERPL-SCNC: 134 MMOL/L — LOW (ref 135–145)
SODIUM SERPL-SCNC: 138 MMOL/L — SIGNIFICANT CHANGE UP (ref 135–145)
WBC # BLD: 13 K/UL — HIGH (ref 3.8–10.5)
WBC # BLD: 14.1 K/UL — HIGH (ref 3.8–10.5)
WBC # BLD: 5.4 K/UL — SIGNIFICANT CHANGE UP (ref 3.8–10.5)
WBC # FLD AUTO: 13 K/UL — HIGH (ref 3.8–10.5)
WBC # FLD AUTO: 14.1 K/UL — HIGH (ref 3.8–10.5)
WBC # FLD AUTO: 5.4 K/UL — SIGNIFICANT CHANGE UP (ref 3.8–10.5)

## 2017-09-12 PROCEDURE — 33979 INSERT INTRACORPOREAL DEVICE: CPT | Mod: 80

## 2017-09-12 PROCEDURE — 33979 INSERT INTRACORPOREAL DEVICE: CPT

## 2017-09-12 PROCEDURE — 88305 TISSUE EXAM BY PATHOLOGIST: CPT | Mod: 26

## 2017-09-12 PROCEDURE — 93010 ELECTROCARDIOGRAM REPORT: CPT

## 2017-09-12 PROCEDURE — 99222 1ST HOSP IP/OBS MODERATE 55: CPT | Mod: 57

## 2017-09-12 PROCEDURE — 71010: CPT | Mod: 26

## 2017-09-12 PROCEDURE — 93010 ELECTROCARDIOGRAM REPORT: CPT | Mod: 77

## 2017-09-12 RX ORDER — POTASSIUM CHLORIDE 20 MEQ
10 PACKET (EA) ORAL
Qty: 0 | Refills: 0 | Status: DISCONTINUED | OUTPATIENT
Start: 2017-09-12 | End: 2017-09-14

## 2017-09-12 RX ORDER — PROPOFOL 10 MG/ML
13.89 INJECTION, EMULSION INTRAVENOUS
Qty: 500 | Refills: 0 | Status: DISCONTINUED | OUTPATIENT
Start: 2017-09-12 | End: 2017-09-13

## 2017-09-12 RX ORDER — PROTAMINE SULFATE 10 MG/ML
25 AMPUL (ML) INTRAVENOUS ONCE
Qty: 0 | Refills: 0 | Status: DISCONTINUED | OUTPATIENT
Start: 2017-09-12 | End: 2017-09-12

## 2017-09-12 RX ORDER — ASPIRIN/CALCIUM CARB/MAGNESIUM 324 MG
325 TABLET ORAL DAILY
Qty: 0 | Refills: 0 | Status: DISCONTINUED | OUTPATIENT
Start: 2017-09-12 | End: 2017-09-12

## 2017-09-12 RX ORDER — HYDROMORPHONE HYDROCHLORIDE 2 MG/ML
0.5 INJECTION INTRAMUSCULAR; INTRAVENOUS; SUBCUTANEOUS ONCE
Qty: 0 | Refills: 0 | Status: DISCONTINUED | OUTPATIENT
Start: 2017-09-12 | End: 2017-09-12

## 2017-09-12 RX ORDER — FLUCONAZOLE 150 MG/1
400 TABLET ORAL EVERY 24 HOURS
Qty: 0 | Refills: 0 | Status: COMPLETED | OUTPATIENT
Start: 2017-09-13 | End: 2017-09-15

## 2017-09-12 RX ORDER — EPINEPHRINE 0.3 MG/.3ML
0.01 INJECTION INTRAMUSCULAR; SUBCUTANEOUS
Qty: 4 | Refills: 0 | Status: DISCONTINUED | OUTPATIENT
Start: 2017-09-12 | End: 2017-09-13

## 2017-09-12 RX ORDER — FENTANYL CITRATE 50 UG/ML
25 INJECTION INTRAVENOUS ONCE
Qty: 0 | Refills: 0 | Status: DISCONTINUED | OUTPATIENT
Start: 2017-09-12 | End: 2017-09-12

## 2017-09-12 RX ORDER — SODIUM CHLORIDE 9 MG/ML
1000 INJECTION, SOLUTION INTRAVENOUS
Qty: 0 | Refills: 0 | Status: DISCONTINUED | OUTPATIENT
Start: 2017-09-12 | End: 2017-09-16

## 2017-09-12 RX ORDER — INSULIN HUMAN 100 [IU]/ML
2 INJECTION, SOLUTION SUBCUTANEOUS
Qty: 100 | Refills: 0 | Status: DISCONTINUED | OUTPATIENT
Start: 2017-09-12 | End: 2017-09-13

## 2017-09-12 RX ORDER — PANTOPRAZOLE SODIUM 20 MG/1
40 TABLET, DELAYED RELEASE ORAL DAILY
Qty: 0 | Refills: 0 | Status: DISCONTINUED | OUTPATIENT
Start: 2017-09-12 | End: 2017-09-16

## 2017-09-12 RX ORDER — AMINOCAPROIC ACID 500 MG/1
5 TABLET ORAL
Qty: 5 | Refills: 0 | Status: DISCONTINUED | OUTPATIENT
Start: 2017-09-12 | End: 2017-09-12

## 2017-09-12 RX ORDER — NOREPINEPHRINE BITARTRATE/D5W 8 MG/250ML
0.04 PLASTIC BAG, INJECTION (ML) INTRAVENOUS
Qty: 8 | Refills: 0 | Status: DISCONTINUED | OUTPATIENT
Start: 2017-09-12 | End: 2017-09-13

## 2017-09-12 RX ORDER — SIMETHICONE 80 MG/1
80 TABLET, CHEWABLE ORAL ONCE
Qty: 0 | Refills: 0 | Status: COMPLETED | OUTPATIENT
Start: 2017-09-12 | End: 2017-09-12

## 2017-09-12 RX ORDER — DOBUTAMINE HCL 250MG/20ML
2.5 VIAL (ML) INTRAVENOUS
Qty: 500 | Refills: 0 | Status: DISCONTINUED | OUTPATIENT
Start: 2017-09-12 | End: 2017-09-13

## 2017-09-12 RX ORDER — MILRINONE LACTATE 1 MG/ML
0.12 INJECTION, SOLUTION INTRAVENOUS
Qty: 20 | Refills: 0 | Status: DISCONTINUED | OUTPATIENT
Start: 2017-09-12 | End: 2017-09-18

## 2017-09-12 RX ORDER — MEPERIDINE HYDROCHLORIDE 50 MG/ML
25 INJECTION INTRAMUSCULAR; INTRAVENOUS; SUBCUTANEOUS ONCE
Qty: 0 | Refills: 0 | Status: DISCONTINUED | OUTPATIENT
Start: 2017-09-12 | End: 2017-09-12

## 2017-09-12 RX ORDER — POTASSIUM CHLORIDE 20 MEQ
10 PACKET (EA) ORAL ONCE
Qty: 0 | Refills: 0 | Status: DISCONTINUED | OUTPATIENT
Start: 2017-09-12 | End: 2017-09-14

## 2017-09-12 RX ORDER — CIPROFLOXACIN LACTATE 400MG/40ML
400 VIAL (ML) INTRAVENOUS EVERY 12 HOURS
Qty: 0 | Refills: 0 | Status: COMPLETED | OUTPATIENT
Start: 2017-09-12 | End: 2017-09-15

## 2017-09-12 RX ORDER — POTASSIUM CHLORIDE 20 MEQ
10 PACKET (EA) ORAL
Qty: 0 | Refills: 0 | Status: COMPLETED | OUTPATIENT
Start: 2017-09-12 | End: 2017-09-12

## 2017-09-12 RX ORDER — METOCLOPRAMIDE HCL 10 MG
10 TABLET ORAL EVERY 8 HOURS
Qty: 0 | Refills: 0 | Status: COMPLETED | OUTPATIENT
Start: 2017-09-12 | End: 2017-09-14

## 2017-09-12 RX ORDER — VANCOMYCIN HCL 1 G
1000 VIAL (EA) INTRAVENOUS EVERY 12 HOURS
Qty: 0 | Refills: 0 | Status: COMPLETED | OUTPATIENT
Start: 2017-09-12 | End: 2017-09-15

## 2017-09-12 RX ORDER — FUROSEMIDE 40 MG
10 TABLET ORAL ONCE
Qty: 0 | Refills: 0 | Status: COMPLETED | OUTPATIENT
Start: 2017-09-12 | End: 2017-09-12

## 2017-09-12 RX ORDER — PROTAMINE SULFATE 10 MG/ML
25 AMPUL (ML) INTRAVENOUS ONCE
Qty: 0 | Refills: 0 | Status: COMPLETED | OUTPATIENT
Start: 2017-09-12 | End: 2017-09-12

## 2017-09-12 RX ORDER — SODIUM BICARBONATE 1 MEQ/ML
50 SYRINGE (ML) INTRAVENOUS ONCE
Qty: 0 | Refills: 0 | Status: COMPLETED | OUTPATIENT
Start: 2017-09-12 | End: 2017-09-12

## 2017-09-12 RX ADMIN — FENTANYL CITRATE 25 MICROGRAM(S): 50 INJECTION INTRAVENOUS at 16:25

## 2017-09-12 RX ADMIN — Medication 1 GRAM(S): at 05:58

## 2017-09-12 RX ADMIN — MUPIROCIN 1 APPLICATION(S): 20 OINTMENT TOPICAL at 02:00

## 2017-09-12 RX ADMIN — Medication 250 MILLIGRAM(S): at 21:50

## 2017-09-12 RX ADMIN — HEPARIN SODIUM 1000 UNIT(S)/HR: 5000 INJECTION INTRAVENOUS; SUBCUTANEOUS at 03:28

## 2017-09-12 RX ADMIN — Medication 9 MICROGRAM(S)/KG/MIN: at 18:09

## 2017-09-12 RX ADMIN — CHLORHEXIDINE GLUCONATE 1 APPLICATION(S): 213 SOLUTION TOPICAL at 02:00

## 2017-09-12 RX ADMIN — Medication 5 MICROGRAM(S)/KG/MIN: at 20:30

## 2017-09-12 RX ADMIN — INSULIN HUMAN 2 UNIT(S)/HR: 100 INJECTION, SOLUTION SUBCUTANEOUS at 20:31

## 2017-09-12 RX ADMIN — HYDROMORPHONE HYDROCHLORIDE 0.5 MILLIGRAM(S): 2 INJECTION INTRAMUSCULAR; INTRAVENOUS; SUBCUTANEOUS at 18:30

## 2017-09-12 RX ADMIN — PANTOPRAZOLE SODIUM 40 MILLIGRAM(S): 20 TABLET, DELAYED RELEASE ORAL at 18:07

## 2017-09-12 RX ADMIN — Medication 9 MICROGRAM(S)/KG/MIN: at 20:31

## 2017-09-12 RX ADMIN — Medication 200 MILLIGRAM(S): at 20:39

## 2017-09-12 RX ADMIN — Medication 100 MILLIEQUIVALENT(S): at 16:30

## 2017-09-12 RX ADMIN — Medication 10 MILLIGRAM(S): at 16:15

## 2017-09-12 RX ADMIN — Medication 5 MICROGRAM(S)/KG/MIN: at 18:08

## 2017-09-12 RX ADMIN — Medication 210 MILLIGRAM(S): at 19:45

## 2017-09-12 RX ADMIN — EPINEPHRINE 11.25 MICROGRAM(S)/KG/MIN: 0.3 INJECTION INTRAMUSCULAR; SUBCUTANEOUS at 18:07

## 2017-09-12 RX ADMIN — HYDROMORPHONE HYDROCHLORIDE 0.5 MILLIGRAM(S): 2 INJECTION INTRAMUSCULAR; INTRAVENOUS; SUBCUTANEOUS at 20:00

## 2017-09-12 RX ADMIN — CHLORHEXIDINE GLUCONATE 30 MILLILITER(S): 213 SOLUTION TOPICAL at 07:00

## 2017-09-12 RX ADMIN — AMINOCAPROIC ACID 250 GM/HR: 500 TABLET ORAL at 18:38

## 2017-09-12 RX ADMIN — FENTANYL CITRATE 25 MICROGRAM(S): 50 INJECTION INTRAVENOUS at 16:40

## 2017-09-12 RX ADMIN — SIMETHICONE 80 MILLIGRAM(S): 80 TABLET, CHEWABLE ORAL at 01:21

## 2017-09-12 RX ADMIN — MILRINONE LACTATE 9 MICROGRAM(S)/KG/MIN: 1 INJECTION, SOLUTION INTRAVENOUS at 20:32

## 2017-09-12 RX ADMIN — Medication 100 MILLIEQUIVALENT(S): at 17:30

## 2017-09-12 RX ADMIN — Medication 100 MILLIEQUIVALENT(S): at 15:45

## 2017-09-12 RX ADMIN — EPINEPHRINE 11.25 MICROGRAM(S)/KG/MIN: 0.3 INJECTION INTRAMUSCULAR; SUBCUTANEOUS at 20:30

## 2017-09-12 RX ADMIN — MILRINONE LACTATE 9 MICROGRAM(S)/KG/MIN: 1 INJECTION, SOLUTION INTRAVENOUS at 18:09

## 2017-09-12 RX ADMIN — HYDROMORPHONE HYDROCHLORIDE 0.5 MILLIGRAM(S): 2 INJECTION INTRAMUSCULAR; INTRAVENOUS; SUBCUTANEOUS at 20:15

## 2017-09-12 RX ADMIN — Medication 10 MILLIGRAM(S): at 21:37

## 2017-09-12 RX ADMIN — Medication 100 MILLIEQUIVALENT(S): at 18:00

## 2017-09-12 RX ADMIN — PROPOFOL 5 MICROGRAM(S)/KG/MIN: 10 INJECTION, EMULSION INTRAVENOUS at 20:33

## 2017-09-12 RX ADMIN — HYDROMORPHONE HYDROCHLORIDE 0.5 MILLIGRAM(S): 2 INJECTION INTRAMUSCULAR; INTRAVENOUS; SUBCUTANEOUS at 18:45

## 2017-09-12 RX ADMIN — PANTOPRAZOLE SODIUM 40 MILLIGRAM(S): 20 TABLET, DELAYED RELEASE ORAL at 05:58

## 2017-09-12 RX ADMIN — Medication 100 MILLIEQUIVALENT(S): at 16:15

## 2017-09-12 RX ADMIN — Medication 50 MILLIEQUIVALENT(S): at 21:37

## 2017-09-12 NOTE — BRIEF OPERATIVE NOTE - PROCEDURE
HeartMateÃ‚Â® II left ventricular assist system (LVAS)  09/12/2017    Active  [id number]  Tricuspid valve repair  09/12/2017    Active  [id number]

## 2017-09-12 NOTE — PROVIDER CONTACT NOTE (CRITICAL VALUE NOTIFICATION) - ACTION/TREATMENT ORDERED:
lower Epi gtts to 8 ml/hr
Advised pt to alert medical staff if bleeding is noted. PA notified and aware, follow heparin gtt nomogram protocol
NP agreed with the above recommendations. Will continue to monitor.
SAME AS ABOVE
advised pt to notify staff if s/s of bleeding occur. heparin gtt order no longer active, heparin gtt dcd. NP notified and aware, continue to monitor

## 2017-09-13 ENCOUNTER — TRANSCRIPTION ENCOUNTER (OUTPATIENT)
Age: 62
End: 2017-09-13

## 2017-09-13 LAB
ALBUMIN SERPL ELPH-MCNC: 3.8 G/DL — SIGNIFICANT CHANGE UP (ref 3.3–5)
ALP SERPL-CCNC: 52 U/L — SIGNIFICANT CHANGE UP (ref 40–120)
ALT FLD-CCNC: 40 U/L RC — SIGNIFICANT CHANGE UP (ref 10–45)
ANION GAP SERPL CALC-SCNC: 14 MMOL/L — SIGNIFICANT CHANGE UP (ref 5–17)
APTT BLD: 24.1 SEC — LOW (ref 27.5–37.4)
APTT BLD: 25.7 SEC — LOW (ref 27.5–37.4)
APTT BLD: 29.8 SEC — SIGNIFICANT CHANGE UP (ref 27.5–37.4)
AST SERPL-CCNC: 88 U/L — HIGH (ref 10–40)
BASE EXCESS BLDV CALC-SCNC: -0.7 MMOL/L — SIGNIFICANT CHANGE UP (ref -2–2)
BASE EXCESS BLDV CALC-SCNC: -0.9 MMOL/L — SIGNIFICANT CHANGE UP (ref -2–2)
BASE EXCESS BLDV CALC-SCNC: -1.4 MMOL/L — SIGNIFICANT CHANGE UP (ref -2–2)
BASE EXCESS BLDV CALC-SCNC: -3.2 MMOL/L — LOW (ref -2–2)
BILIRUB SERPL-MCNC: 0.8 MG/DL — SIGNIFICANT CHANGE UP (ref 0.2–1.2)
BUN SERPL-MCNC: 17 MG/DL — SIGNIFICANT CHANGE UP (ref 7–23)
CALCIUM SERPL-MCNC: 9.1 MG/DL — SIGNIFICANT CHANGE UP (ref 8.4–10.5)
CHLORIDE SERPL-SCNC: 101 MMOL/L — SIGNIFICANT CHANGE UP (ref 96–108)
CO2 BLDV-SCNC: 24 MMOL/L — SIGNIFICANT CHANGE UP (ref 22–30)
CO2 BLDV-SCNC: 25 MMOL/L — SIGNIFICANT CHANGE UP (ref 22–30)
CO2 BLDV-SCNC: 25 MMOL/L — SIGNIFICANT CHANGE UP (ref 22–30)
CO2 BLDV-SCNC: 26 MMOL/L — SIGNIFICANT CHANGE UP (ref 22–30)
CO2 SERPL-SCNC: 21 MMOL/L — LOW (ref 22–31)
CREAT SERPL-MCNC: 0.7 MG/DL — SIGNIFICANT CHANGE UP (ref 0.5–1.3)
GAS PNL BLDA: SIGNIFICANT CHANGE UP
GAS PNL BLDV: SIGNIFICANT CHANGE UP
GLUCOSE SERPL-MCNC: 107 MG/DL — HIGH (ref 70–99)
HAPTOGLOB SERPL-MCNC: 73 MG/DL — SIGNIFICANT CHANGE UP (ref 34–200)
HCO3 BLDV-SCNC: 22 MMOL/L — SIGNIFICANT CHANGE UP (ref 21–29)
HCO3 BLDV-SCNC: 23 MMOL/L — SIGNIFICANT CHANGE UP (ref 21–29)
HCO3 BLDV-SCNC: 24 MMOL/L — SIGNIFICANT CHANGE UP (ref 21–29)
HCO3 BLDV-SCNC: 24 MMOL/L — SIGNIFICANT CHANGE UP (ref 21–29)
HCT VFR BLD CALC: 25.9 % — LOW (ref 39–50)
HGB BLD-MCNC: 9 G/DL — LOW (ref 13–17)
HOROWITZ INDEX BLDV+IHG-RTO: 40 — SIGNIFICANT CHANGE UP
INR BLD: 1.19 RATIO — HIGH (ref 0.88–1.16)
INR BLD: 1.23 RATIO — HIGH (ref 0.88–1.16)
LDH SERPL L TO P-CCNC: 368 U/L — HIGH (ref 50–242)
MCHC RBC-ENTMCNC: 33.3 PG — SIGNIFICANT CHANGE UP (ref 27–34)
MCHC RBC-ENTMCNC: 34.5 GM/DL — SIGNIFICANT CHANGE UP (ref 32–36)
MCV RBC AUTO: 96.3 FL — SIGNIFICANT CHANGE UP (ref 80–100)
PCO2 BLDV: 40 MMHG — SIGNIFICANT CHANGE UP (ref 35–50)
PCO2 BLDV: 43 MMHG — SIGNIFICANT CHANGE UP (ref 35–50)
PCO2 BLDV: 44 MMHG — SIGNIFICANT CHANGE UP (ref 35–50)
PCO2 BLDV: 47 MMHG — SIGNIFICANT CHANGE UP (ref 35–50)
PH BLDV: 7.32 — LOW (ref 7.35–7.45)
PH BLDV: 7.33 — LOW (ref 7.35–7.45)
PH BLDV: 7.37 — SIGNIFICANT CHANGE UP (ref 7.35–7.45)
PH BLDV: 7.39 — SIGNIFICANT CHANGE UP (ref 7.35–7.45)
PLATELET # BLD AUTO: 161 K/UL — SIGNIFICANT CHANGE UP (ref 150–400)
PO2 BLDV: 32 MMHG — SIGNIFICANT CHANGE UP (ref 25–45)
PO2 BLDV: 34 MMHG — SIGNIFICANT CHANGE UP (ref 25–45)
PO2 BLDV: 39 MMHG — SIGNIFICANT CHANGE UP (ref 25–45)
PO2 BLDV: 41 MMHG — SIGNIFICANT CHANGE UP (ref 25–45)
POTASSIUM SERPL-MCNC: 4.7 MMOL/L — SIGNIFICANT CHANGE UP (ref 3.5–5.3)
POTASSIUM SERPL-SCNC: 4.7 MMOL/L — SIGNIFICANT CHANGE UP (ref 3.5–5.3)
PROT SERPL-MCNC: 6.1 G/DL — SIGNIFICANT CHANGE UP (ref 6–8.3)
PROTHROM AB SERPL-ACNC: 13 SEC — HIGH (ref 9.8–12.7)
PROTHROM AB SERPL-ACNC: 13.5 SEC — HIGH (ref 9.8–12.7)
RBC # BLD: 2.69 M/UL — LOW (ref 4.2–5.8)
RBC # FLD: 14.5 % — SIGNIFICANT CHANGE UP (ref 10.3–14.5)
SAO2 % BLDV: 55 % — LOW (ref 67–88)
SAO2 % BLDV: 58 % — LOW (ref 67–88)
SAO2 % BLDV: 72 % — SIGNIFICANT CHANGE UP (ref 67–88)
SAO2 % BLDV: 72 % — SIGNIFICANT CHANGE UP (ref 67–88)
SODIUM SERPL-SCNC: 136 MMOL/L — SIGNIFICANT CHANGE UP (ref 135–145)
WBC # BLD: 9.1 K/UL — SIGNIFICANT CHANGE UP (ref 3.8–10.5)
WBC # FLD AUTO: 9.1 K/UL — SIGNIFICANT CHANGE UP (ref 3.8–10.5)

## 2017-09-13 PROCEDURE — 99292 CRITICAL CARE ADDL 30 MIN: CPT

## 2017-09-13 PROCEDURE — 93321 DOPPLER ECHO F-UP/LMTD STD: CPT | Mod: 26

## 2017-09-13 PROCEDURE — 93010 ELECTROCARDIOGRAM REPORT: CPT

## 2017-09-13 PROCEDURE — 93750 INTERROGATION VAD IN PERSON: CPT

## 2017-09-13 PROCEDURE — 71010: CPT | Mod: 26

## 2017-09-13 PROCEDURE — 99291 CRITICAL CARE FIRST HOUR: CPT

## 2017-09-13 PROCEDURE — 99233 SBSQ HOSP IP/OBS HIGH 50: CPT | Mod: 25,GC

## 2017-09-13 PROCEDURE — 93308 TTE F-UP OR LMTD: CPT | Mod: 26

## 2017-09-13 RX ORDER — HYDRALAZINE HCL 50 MG
10 TABLET ORAL ONCE
Qty: 0 | Refills: 0 | Status: COMPLETED | OUTPATIENT
Start: 2017-09-13 | End: 2017-09-13

## 2017-09-13 RX ORDER — OXYCODONE AND ACETAMINOPHEN 5; 325 MG/1; MG/1
1 TABLET ORAL EVERY 4 HOURS
Qty: 0 | Refills: 0 | Status: DISCONTINUED | OUTPATIENT
Start: 2017-09-13 | End: 2017-09-19

## 2017-09-13 RX ORDER — BUMETANIDE 0.25 MG/ML
1 INJECTION INTRAMUSCULAR; INTRAVENOUS ONCE
Qty: 0 | Refills: 0 | Status: COMPLETED | OUTPATIENT
Start: 2017-09-13 | End: 2017-09-13

## 2017-09-13 RX ORDER — HEPARIN SODIUM 5000 [USP'U]/ML
500 INJECTION INTRAVENOUS; SUBCUTANEOUS
Qty: 25000 | Refills: 0 | Status: DISCONTINUED | OUTPATIENT
Start: 2017-09-13 | End: 2017-09-14

## 2017-09-13 RX ORDER — HYDROMORPHONE HYDROCHLORIDE 2 MG/ML
0.5 INJECTION INTRAMUSCULAR; INTRAVENOUS; SUBCUTANEOUS ONCE
Qty: 0 | Refills: 0 | Status: DISCONTINUED | OUTPATIENT
Start: 2017-09-13 | End: 2017-09-13

## 2017-09-13 RX ORDER — DOCUSATE SODIUM 100 MG
100 CAPSULE ORAL THREE TIMES A DAY
Qty: 0 | Refills: 0 | Status: DISCONTINUED | OUTPATIENT
Start: 2017-09-13 | End: 2017-10-01

## 2017-09-13 RX ORDER — INSULIN LISPRO 100/ML
VIAL (ML) SUBCUTANEOUS
Qty: 0 | Refills: 0 | Status: DISCONTINUED | OUTPATIENT
Start: 2017-09-13 | End: 2017-09-18

## 2017-09-13 RX ORDER — INSULIN LISPRO 100/ML
VIAL (ML) SUBCUTANEOUS AT BEDTIME
Qty: 0 | Refills: 0 | Status: DISCONTINUED | OUTPATIENT
Start: 2017-09-13 | End: 2017-09-18

## 2017-09-13 RX ORDER — POLYETHYLENE GLYCOL 3350 17 G/17G
17 POWDER, FOR SOLUTION ORAL DAILY
Qty: 0 | Refills: 0 | Status: DISCONTINUED | OUTPATIENT
Start: 2017-09-13 | End: 2017-10-01

## 2017-09-13 RX ORDER — OXYCODONE AND ACETAMINOPHEN 5; 325 MG/1; MG/1
2 TABLET ORAL EVERY 4 HOURS
Qty: 0 | Refills: 0 | Status: DISCONTINUED | OUTPATIENT
Start: 2017-09-13 | End: 2017-09-19

## 2017-09-13 RX ORDER — ONDANSETRON 8 MG/1
4 TABLET, FILM COATED ORAL ONCE
Qty: 0 | Refills: 0 | Status: COMPLETED | OUTPATIENT
Start: 2017-09-13 | End: 2017-09-13

## 2017-09-13 RX ORDER — ASPIRIN/CALCIUM CARB/MAGNESIUM 324 MG
81 TABLET ORAL DAILY
Qty: 0 | Refills: 0 | Status: DISCONTINUED | OUTPATIENT
Start: 2017-09-13 | End: 2017-10-25

## 2017-09-13 RX ADMIN — Medication 10 MILLIGRAM(S): at 06:00

## 2017-09-13 RX ADMIN — HYDROMORPHONE HYDROCHLORIDE 0.5 MILLIGRAM(S): 2 INJECTION INTRAMUSCULAR; INTRAVENOUS; SUBCUTANEOUS at 09:00

## 2017-09-13 RX ADMIN — Medication 250 MILLIGRAM(S): at 21:10

## 2017-09-13 RX ADMIN — Medication 200 MILLIGRAM(S): at 08:44

## 2017-09-13 RX ADMIN — Medication 81 MILLIGRAM(S): at 11:17

## 2017-09-13 RX ADMIN — Medication 10 MILLIGRAM(S): at 17:52

## 2017-09-13 RX ADMIN — HEPARIN SODIUM 5 UNIT(S)/HR: 5000 INJECTION INTRAVENOUS; SUBCUTANEOUS at 15:04

## 2017-09-13 RX ADMIN — POLYETHYLENE GLYCOL 3350 17 GRAM(S): 17 POWDER, FOR SOLUTION ORAL at 21:09

## 2017-09-13 RX ADMIN — Medication 4.5 MICROGRAM(S)/KG/MIN: at 09:51

## 2017-09-13 RX ADMIN — ONDANSETRON 4 MILLIGRAM(S): 8 TABLET, FILM COATED ORAL at 18:20

## 2017-09-13 RX ADMIN — Medication 250 MILLIGRAM(S): at 09:51

## 2017-09-13 RX ADMIN — BUMETANIDE 1 MILLIGRAM(S): 0.25 INJECTION INTRAMUSCULAR; INTRAVENOUS at 12:04

## 2017-09-13 RX ADMIN — PANTOPRAZOLE SODIUM 40 MILLIGRAM(S): 20 TABLET, DELAYED RELEASE ORAL at 11:17

## 2017-09-13 RX ADMIN — Medication 100 MILLIGRAM(S): at 21:09

## 2017-09-13 RX ADMIN — Medication 10 MILLIGRAM(S): at 13:00

## 2017-09-13 RX ADMIN — Medication 10 MILLIGRAM(S): at 21:09

## 2017-09-13 RX ADMIN — MILRINONE LACTATE 4.5 MICROGRAM(S)/KG/MIN: 1 INJECTION, SOLUTION INTRAVENOUS at 09:51

## 2017-09-13 RX ADMIN — HYDROMORPHONE HYDROCHLORIDE 0.5 MILLIGRAM(S): 2 INJECTION INTRAMUSCULAR; INTRAVENOUS; SUBCUTANEOUS at 08:44

## 2017-09-13 RX ADMIN — HYDROMORPHONE HYDROCHLORIDE 0.5 MILLIGRAM(S): 2 INJECTION INTRAMUSCULAR; INTRAVENOUS; SUBCUTANEOUS at 16:30

## 2017-09-13 RX ADMIN — HYDROMORPHONE HYDROCHLORIDE 0.5 MILLIGRAM(S): 2 INJECTION INTRAMUSCULAR; INTRAVENOUS; SUBCUTANEOUS at 19:50

## 2017-09-13 RX ADMIN — FLUCONAZOLE 100 MILLIGRAM(S): 150 TABLET ORAL at 09:12

## 2017-09-13 RX ADMIN — HYDROMORPHONE HYDROCHLORIDE 0.5 MILLIGRAM(S): 2 INJECTION INTRAMUSCULAR; INTRAVENOUS; SUBCUTANEOUS at 20:05

## 2017-09-13 RX ADMIN — HYDROMORPHONE HYDROCHLORIDE 0.5 MILLIGRAM(S): 2 INJECTION INTRAMUSCULAR; INTRAVENOUS; SUBCUTANEOUS at 16:45

## 2017-09-13 RX ADMIN — Medication 200 MILLIGRAM(S): at 21:10

## 2017-09-13 NOTE — PROGRESS NOTE ADULT - ASSESSMENT
62M NICM  (LVDD 6.7 cm) HFrEF, (LVEF 10%), ACC/AHA stage D with NYHA class IV symptoms, pw abd pain, elevated lactic acid. Initial RHC with depressed CI (0.6).  A-line, swan now removed.     ADHF , s/p LVAD 17  p~100,  MAP= 80-90  126<-- 131 (, standing) <-- 131 (9/10, bed wt) <-- 132.9 (,standing) <-- 131 (,bed)  - milrinone @ 0.25,  @2.5  will wean  off.    - start bumex 1mg QD  inh NO weaning  -repeat TTE today s/p LVAD    + LV thrombus, embolized  - heparin gtt    ?CAD  - atorvastatin 20mg    Abd pain  - on pantoprazole 40 BID,  -sucralfate

## 2017-09-13 NOTE — PROGRESS NOTE ADULT - SUBJECTIVE AND OBJECTIVE BOX
Operation  POD  Narrative     Vital Signs Last 24 Hrs  T(C): 36.4 (13 Sep 2017 03:00), Max: 36.6 (12 Sep 2017 08:00)  T(F): 97.5 (13 Sep 2017 03:00), Max: 97.8 (12 Sep 2017 08:00)  HR: 86 (13 Sep 2017 03:15) (85 - 119)  BP: 108/78 (12 Sep 2017 08:00) (86/56 - 108/81)  BP(mean): 89 (12 Sep 2017 08:00) (67 - 89)  RR: 14 (13 Sep 2017 03:15) (12 - 37)  SpO2: 100% (13 Sep 2017 03:15) (92% - 100%)  I&O's Detail    11 Sep 2017 07:01  -  12 Sep 2017 07:00  --------------------------------------------------------  IN:    heparin  Infusion.: 210 mL    milrinone  Infusion: 149.6 mL    Oral Fluid: 240 mL  Total IN: 599.6 mL    OUT:    Voided: 1025 mL  Total OUT: 1025 mL    Total NET: -425.4 mL      12 Sep 2017 07:01  -  13 Sep 2017 03:33  --------------------------------------------------------  IN:    aminocaproic acid Infusion: 250 mL    Cryoprecipitate: 150 mL    DOBUTamine Infusion: 36 mL    DOBUTamine Infusion: 22.5 mL    EPINEPHrine Infusion: 2 mL    EPINEPHrine Infusion: 52 mL    FFP (Fresh Frozen Plasma): 500 mL    insulin Infusion: 49 mL    milrinone  Infusion: 49.5 mL    milrinone  Infusion: 6.8 mL    norepinephrine Infusion: 20.2 mL    Packed Red Blood Cells: 850 mL    Platelets - Single Donor: 300 mL    propofol Infusion: 82.8 mL    sodium chloride 0.45%.: 110 mL    Solution: 700 mL  Total IN: 3180.8 mL    OUT:    Chest Tube: 790 mL    Chest Tube: 170 mL    Chest Tube: 320 mL    Chest Tube: 260 mL    Indwelling Catheter - Urethral: 1695 mL  Total OUT: 3235 mL    Total NET: -54.2 mL          CHEST TUBE:  Yes/No. AIR LEAKS: Yes/No. Suction / H2O SEAL.   OZZIE DRAIN:  Yes/No.  EPICARDIAL WIRES: Yes/No.  BELTRAN: Yes/No.    LABS:    MEDICATIONS  (STANDING):  sodium chloride 0.45%. 1000 milliLiter(s) (10 mL/Hr) IV Continuous <Continuous>  pantoprazole  Injectable 40 milliGRAM(s) IV Push daily  potassium chloride  10 mEq/50 mL IVPB 10 milliEquivalent(s) IV Intermittent every 1 hour  potassium chloride  10 mEq/50 mL IVPB 10 milliEquivalent(s) IV Intermittent every 1 hour  potassium chloride  10 mEq/50 mL IVPB 10 milliEquivalent(s) IV Intermittent once  norepinephrine Infusion 0.044 MICROgram(s)/kG/Min (5 mL/Hr) IV Continuous <Continuous>  insulin Infusion 2 Unit(s)/Hr (2 mL/Hr) IV Continuous <Continuous>  metoclopramide Injectable 10 milliGRAM(s) IV Push every 8 hours  DOBUTamine Infusion 2.5 MICROgram(s)/kG/Min (4.5 mL/Hr) IV Continuous <Continuous>  EPINEPHrine     Infusion 0.009 MICROgram(s)/kG/Min (2 mL/Hr) IV Continuous <Continuous>  milrinone Infusion 0.5 MICROgram(s)/kG/Min (9 mL/Hr) IV Continuous <Continuous>  fluconAZOLE IVPB 400 milliGRAM(s) IV Intermittent every 24 hours  vancomycin  IVPB 1000 milliGRAM(s) IV Intermittent every 12 hours  ciprofloxacin   IVPB 400 milliGRAM(s) IV Intermittent every 12 hours  propofol Infusion 13.889 MICROgram(s)/kG/Min (5 mL/Hr) IV Continuous <Continuous>    MEDICATIONS  (PRN):    61 yo male s/p LVAD/TV Ring. Patient hemodynamically stable and did well overnight.    General: A+Ox3, in NAD  Chest: sternal dressing C/D/I  Heart: S1, S2, RRR  Lungs: CTA B/L, without W/R/R  Abdomen: Soft, ND, NT, positive BS  Extremeties: without edema B/L LE, positive DP pulses B/L     Does the patient have a history of CHF? yes  -If yes, systolic or diastolic systolic   -pre-operative EF10-15%    Extubation within 24 hours? Planned     CXR No acute changes     Does the patient have a history of kidney disease? no  -give CKD stage if applicable  -what is patient's baseline Creatinine 0.95    Beta Blockers contraindicated for the first 24 hours due to vasopressor/inotrpic medication?  -If on pressors, indication:    Did the patient receive transfusion of blood and/or products?  -If yes, indication:    DVT PPX: Yes w SCDs     Wilma-operative antibiotics discontinued within 48 hours of CTU admission?  -name/date/time of discontinue Cipro Diflucan and Vancomycin as per LVAD protocol       RADIOLOGY & ADDITIONAL TESTS:                        9.0    9.1   )-----------( 161      ( 13 Sep 2017 01:47 )             25.9       09-13    136  |  101  |  17  ----------------------------<  107<H>  4.7   |  21<L>  |  0.70    Ca    9.1      13 Sep 2017 01:47    TPro  6.1  /  Alb  3.8  /  TBili  0.8  /  DBili  x   /  AST  88<H>  /  ALT  40  /  AlkPhos  52  09-13      I&O's Summary    11 Sep 2017 07:01  -  12 Sep 2017 07:00  --------------------------------------------------------  IN: 599.6 mL / OUT: 1025 mL / NET: -425.4 mL    12 Sep 2017 07:01  -  13 Sep 2017 03:39  --------------------------------------------------------  IN: 3180.8 mL / OUT: 3235 mL / NET: -54.2 mL        LIVER FUNCTIONS - ( 13 Sep 2017 01:47 )  Alb: 3.8 g/dL / Pro: 6.1 g/dL / ALK PHOS: 52 U/L / ALT: 40 U/L RC / AST: 88 U/L / GGT: x             PT/INR - ( 13 Sep 2017 01:47 )   PT: 13.0 sec;   INR: 1.19 ratio         PTT - ( 13 Sep 2017 01:47 )  PTT:25.7 sec    Vital Signs Last 24 Hrs  T(C): 36.4 (13 Sep 2017 03:00), Max: 36.6 (12 Sep 2017 08:00)  T(F): 97.5 (13 Sep 2017 03:00), Max: 97.8 (12 Sep 2017 08:00)  HR: 86 (13 Sep 2017 03:15) (85 - 119)  BP: 108/78 (12 Sep 2017 08:00) (86/56 - 108/81)  BP(mean): 89 (12 Sep 2017 08:00) (67 - 89)  RR: 14 (13 Sep 2017 03:15) (12 - 37)  SpO2: 100% (13 Sep 2017 03:15) (92% - 100%)    Mode: AC/ CMV (Assist Control/ Continuous Mandatory Ventilation)  RR (machine): 14  TV (machine): 550  FiO2: 40  PEEP: 5  ITime: 1  MAP: 12  PIP: 18      Patient care discussed on morning interdisciplinary rounds with CTS team.

## 2017-09-13 NOTE — PROGRESS NOTE ADULT - SUBJECTIVE AND OBJECTIVE BOX
CRITICAL CARE ATTENDING - CTICU    MEDICATIONS  (STANDING):  sodium chloride 0.45%. 1000 milliLiter(s) (10 mL/Hr) IV Continuous <Continuous>  pantoprazole  Injectable 40 milliGRAM(s) IV Push daily  potassium chloride  10 mEq/50 mL IVPB 10 milliEquivalent(s) IV Intermittent every 1 hour  potassium chloride  10 mEq/50 mL IVPB 10 milliEquivalent(s) IV Intermittent every 1 hour  potassium chloride  10 mEq/50 mL IVPB 10 milliEquivalent(s) IV Intermittent once  insulin Infusion 2 Unit(s)/Hr (2 mL/Hr) IV Continuous <Continuous>  metoclopramide Injectable 10 milliGRAM(s) IV Push every 8 hours  milrinone Infusion 0.25 MICROgram(s)/kG/Min (4.5 mL/Hr) IV Continuous <Continuous>  fluconAZOLE IVPB 400 milliGRAM(s) IV Intermittent every 24 hours  vancomycin  IVPB 1000 milliGRAM(s) IV Intermittent every 12 hours  ciprofloxacin   IVPB 400 milliGRAM(s) IV Intermittent every 12 hours  aspirin enteric coated 81 milliGRAM(s) Oral daily  buMETAnide Injectable 1 milliGRAM(s) IV Push once                                    9.0    9.1   )-----------( 161      ( 13 Sep 2017 01:47 )             25.9       09-13    136  |  101  |  17  ----------------------------<  107<H>  4.7   |  21<L>  |  0.70    Ca    9.1      13 Sep 2017 01:47    TPro  6.1  /  Alb  3.8  /  TBili  0.8  /  DBili  x   /  AST  88<H>  /  ALT  40  /  AlkPhos  52  13      PT/INR - ( 13 Sep 2017 01:47 )   PT: 13.0 sec;   INR: 1.19 ratio         PTT - ( 13 Sep 2017 01:47 )  PTT:25.7 sec    Mode: CPAP with PS  FiO2: 40  PEEP: 5  PS: 5  MAP: 7  PIP: 11      Daily     Daily Weight in k.3 (13 Sep 2017 00:00)      -12 @ 07:01  -  -13 @ 07:00  --------------------------------------------------------  IN: 3267.6 mL / OUT: 3610 mL / NET: -342.4 mL     @ 07:01  -   @ 11:30  --------------------------------------------------------  IN: 726 mL / OUT: 400 mL / NET: 326 mL        Critically Ill patient  : [ ] preoperative ,   [x ] post operative    Requires :  [x ] Arterial Line   [x ] Central Line  [ ] PA catheter  [ ] IABP  [ ] ECMO  [x ] LVAD  [ ] Ventilator  [ ] pacemaker [ ] Impella.    Bedside evaluation , monitoring , treatment of hemodynamics , fluids , IVP/ IVCD meds.        Diagnosis:     POD 1: LVAD    Cardiogenic Shock  - resolving    CHF- acute [x ]   chronic [x ]    systolic [x ]   diatolic [ ]          - Echo- EF -  15%           [ ] RV dysfunction          - Cxr-cardiomegally, edema          - Clinical-  [x ]inotropes   [x ]pressors   [ ]diuresis   [ ]IABP   [ ]ECMO   [x ]LVAD   [ ]Respiratory Failure    Hemodynamic lability,instability. Requires IVCD [x ] vasopressors [x ] inotropes  [ ] vasodilator  [ ]IVSS fluid  to maintain MAP, perfusion, C.I.     Requires bedside physical therapy, mobilization and total group home care.     IVCD anticoagulation with [x ] Heparin  [ ] Argatroban for LVAD    [facility] catheter interpretation and therapeutic management of unstable hemodynamics     Ventilator Management:  [ ]AC-rest    [x ]CPAP-PS Wean    [ ]Trach Collar     [x ]Extubate    [ ] T-Piece  [ ]peep>5                                 -                     Discussed with CT surgeon, Physician's Assistant - Nurse Practitioner- Critical care medicine team.   Dicussed at  AM / PM rounds.   Chart, labs , films reviewed.    Total Time: 120 min.

## 2017-09-13 NOTE — PROVIDER CONTACT NOTE (OTHER) - RECOMMENDATIONS
2L placed
Assess pt, review medication orders, review tests.
Assess pt, review medications, order 12 lead ecg, order analgesia for chest pain, order a set of cardiac enzymes.
Hold off on meal, cat scan??
NP at bedside
hydralazine
meds?, labs?
no interventions at this time, no ectopy on tele or changes on EKG
order BMP? will continue to monitor
placed on 3 LN NC while sleeping.

## 2017-09-13 NOTE — PROGRESS NOTE ADULT - PROBLEM SELECTOR PLAN 1
patient is off bronchodilators at this time. He has not been wheezing  but does have underlying COPD: Suggest to add spiriva, if it is ok with primary team. Keep his o2 sao2 above 88%. Chest radiograph reviewed, and I think it is atelectasis rather then pneumonia at left base: Pt has no leucocytosis and T Max of 99.5.

## 2017-09-13 NOTE — PROGRESS NOTE ADULT - ASSESSMENT
63 yo male s/p LVAD/TV Ring   1) CV: Hemodynamically adequate. Remains on  2.5/Primacore 0.025. [id number]   2)Pulm: Vent support on FIO2 40%./ NO2 10PPM. Monitor CT output.   3) Renal: Adequate UOP 60-75 CC/HR. Electrolytes replaced.   4) GI/DVT prophylaxis  5) Post op bleeding requiring multiple blood and blood products. Now stable.   6) ID Antibiotic coverage as per LVAD protocol.

## 2017-09-13 NOTE — PROGRESS NOTE ADULT - SUBJECTIVE AND OBJECTIVE BOX
Patient is a 62y old  Male who presents with a chief complaint of SOOB (25 Aug 2017 22:27)    pt s/p LVAD:  Alert and awake  on NO and /Milrinone      Any change in ROS:     MEDICATIONS  (STANDING):  sodium chloride 0.45%. 1000 milliLiter(s) (10 mL/Hr) IV Continuous <Continuous>  pantoprazole  Injectable 40 milliGRAM(s) IV Push daily  potassium chloride  10 mEq/50 mL IVPB 10 milliEquivalent(s) IV Intermittent every 1 hour  potassium chloride  10 mEq/50 mL IVPB 10 milliEquivalent(s) IV Intermittent every 1 hour  potassium chloride  10 mEq/50 mL IVPB 10 milliEquivalent(s) IV Intermittent once  insulin Infusion 2 Unit(s)/Hr (2 mL/Hr) IV Continuous <Continuous>  metoclopramide Injectable 10 milliGRAM(s) IV Push every 8 hours  DOBUTamine Infusion 2.5 MICROgram(s)/kG/Min (4.5 mL/Hr) IV Continuous <Continuous>  milrinone Infusion 0.25 MICROgram(s)/kG/Min (4.5 mL/Hr) IV Continuous <Continuous>  fluconAZOLE IVPB 400 milliGRAM(s) IV Intermittent every 24 hours  vancomycin  IVPB 1000 milliGRAM(s) IV Intermittent every 12 hours  ciprofloxacin   IVPB 400 milliGRAM(s) IV Intermittent every 12 hours  aspirin enteric coated 81 milliGRAM(s) Oral daily    MEDICATIONS  (PRN):    Vital Signs Last 24 Hrs  T(C): 37.4 (13 Sep 2017 08:00), Max: 37.5 (13 Sep 2017 07:00)  T(F): 99.3 (13 Sep 2017 08:00), Max: 99.5 (13 Sep 2017 07:00)  HR: 100 (13 Sep 2017 10:00) (85 - 112)  BP: --  BP(mean): --  RR: 22 (13 Sep 2017 10:00) (12 - 37)  SpO2: 99% (13 Sep 2017 10:00) (92% - 100%)  Mode: CPAP with PS  FiO2: 40  PEEP: 5  PS: 5  MAP: 7  PIP: 11    I&O's Summary    12 Sep 2017 07:01  -  13 Sep 2017 07:00  --------------------------------------------------------  IN: 3267.6 mL / OUT: 3610 mL / NET: -342.4 mL    13 Sep 2017 07:01  -  13 Sep 2017 10:17  --------------------------------------------------------  IN: 707 mL / OUT: 310 mL / NET: 397 mL          Physical Exam:   GENERAL: NAD, well-groomed, well-developed  HEENT: ALONSO/   Atraumatic, Normocephalic  ENMT: No tonsillar erythema, exudates, or enlargement; Moist mucous membranes, Good dentition, No lesions  NECK: Supple, No JVD, Normal thyroid  CHEST/LUNG: Poor air entry bilaterally   CVS: Regular rate and rhythm; No murmurs, rubs, or gallops  GI: : Soft, Nontender, Nondistended; Bowel sounds present  NERVOUS SYSTEM:  Alert & Oriented X3  EXTREMITIES:  2+ Peripheral Pulses, No clubbing, cyanosis, or edema  LYMPH: No lymphadenopathy noted  SKIN: No rashes or lesions  ENDOCRINOLOGY: No Thyromegaly  PSYCH: Appropriate    Labs:  ABG - ( 13 Sep 2017 09:13 )  pH: 7.44  /  pCO2: 35    /  pO2: 82    / HCO3: 23    / Base Excess: -.4   /  SaO2: 97                            9.0    9.1   )-----------( 161      ( 13 Sep 2017 01:47 )             25.9                         9.3    13.0  )-----------( 182      ( 12 Sep 2017 19:11 )             27.9                         9.1    14.1  )-----------( 177      ( 12 Sep 2017 15:38 )             27.6                         13.2   5.4   )-----------( 166      ( 12 Sep 2017 02:13 )             39.2                         13.5   6.0   )-----------( 159      ( 11 Sep 2017 03:34 )             39.4                         12.8   5.9   )-----------( 168      ( 10 Sep 2017 02:52 )             38.1                         13.3   5.5   )-----------( 157      ( 09 Sep 2017 19:24 )             41.2                         13.0   5.2   )-----------( 155      ( 09 Sep 2017 12:07 )             39.4         136  |  101  |  17  ----------------------------<  107<H>  4.7   |  21<L>  |  0.70      138  |  102  |  23  ----------------------------<  153<H>  3.9   |  18<L>  |  0.95      134<L>  |  98  |  27<H>  ----------------------------<  119<H>  4.3   |  23  |  1.07      134<L>  |  98  |  19  ----------------------------<  101<H>  5.0   |  22  |  0.86  09-10    136  |  101  |  23  ----------------------------<  165<H>  4.5   |  22  |  1.01    Ca    9.1      13 Sep 2017 01:47  Ca    9.4      12 Sep 2017 15:38  Ca    9.4      12 Sep 2017 02:13    TPro  6.1  /  Alb  3.8  /  TBili  0.8  /  DBili  x   /  AST  88<H>  /  ALT  40  /  AlkPhos  52    TPro  6.0  /  Alb  3.9  /  TBili  0.7  /  DBili  x   /  AST  67<H>  /  ALT  35  /  AlkPhos  57    TPro  7.3  /  Alb  3.7  /  TBili  0.4  /  DBili  x   /  AST  34  /  ALT  52<H>  /  AlkPhos  97    TPro  7.4  /  Alb  3.7  /  TBili  0.6  /  DBili  x   /  AST  50<H>  /  ALT  63<H>  /  AlkPhos  97  -11  TPro  7.1  /  Alb  3.7  /  TBili  0.3  /  DBili  x   /  AST  29  /  ALT  45  /  AlkPhos  98  09-10    CAPILLARY BLOOD GLUCOSE  122 (13 Sep 2017 07:00)  113 (13 Sep 2017 06:00)  110 (13 Sep 2017 05:30)  98 (13 Sep 2017 05:00)  98 (13 Sep 2017 04:30)  83 (13 Sep 2017 04:00)  79 (13 Sep 2017 03:30)  79 (13 Sep 2017 03:00)  77 (13 Sep 2017 02:30)  97 (13 Sep 2017 02:00)  118 (13 Sep 2017 01:00)  186 (13 Sep 2017 00:00)  229 (12 Sep 2017 23:00)  227 (12 Sep 2017 22:00)  211 (12 Sep 2017 21:00)  240 (12 Sep 2017 20:00)  245 (12 Sep 2017 19:00)  235 (12 Sep 2017 18:00)  216 (12 Sep 2017 17:00)  146 (12 Sep 2017 15:30)          LIVER FUNCTIONS - ( 13 Sep 2017 01:47 )  Alb: 3.8 g/dL / Pro: 6.1 g/dL / ALK PHOS: 52 U/L / ALT: 40 U/L RC / AST: 88 U/L / GGT: x           PT/INR - ( 13 Sep 2017 01:47 )   PT: 13.0 sec;   INR: 1.19 ratio         PTT - ( 13 Sep 2017 01:47 )  PTT:25.7 sec    Cultures:     < from: Xray Chest 1 View AP/PA (17 @ 03:36) >  EXAM:  CHEST SINGLE AP OR PA                            PROCEDURE DATE:  2017            INTERPRETATION:  A single chest x-ray was obtained on 2017.    Indication: Status post cardiac surgery.    Impression:    The heart is enlarged. Left lower lobe pneumonia and/or atelectasis. The   right lung is clear. All life supporting devices are in good position and   unchanged when compared to previous study done 2017.                    ANIRUDH HUMPHRYES M.D., ATTENDING RADIOLOGIST  This document has been electronically signed. Sep 13 2017  9:37AM        < end of copied text >        < from: CT Virtual Colonoscopy, Screening (17 @ 14:46) >    Additional findings are as follows:  Right lower lobe linear atelectasis.  Bilateral pleural effusions.  Cardiomegaly.  Moderate atherosclerosis of abdominal aorta and its branches.  Severe degenerative changes of L4 and L5 with severe intervertebral space   narrowing and Schmorl's node in the L5 vertebral body.    IMPRESSION:   No colonic polyp or mass.    LENNIE SUAREZ M.D., RADIOLOGY RESIDENT  This document has been electronically signed.  CANDIDO WISDOM M.D., ATTENDING RADIOLOGIST  This document has been electronically signed. Sep 11 2017  3:30PM        < end of copied text >                    Studies  Chest X-RAY  CT SCAN Chest   Venous Dopplers: LE:   CT Abdomen  Others    < from: Xray Chest 1 View AP/PA (17 @ 03:36) >    EXAM:  CHEST SINGLE AP OR PA                            PROCEDURE DATE:  2017            INTERPRETATION:  A single chest x-ray was obtained on 2017.    Indication: Status post cardiac surgery.    Impression:    The heart is enlarged. Left lower lobe pneumonia and/or atelectasis. The   right lung is clear. All life supporting devices are in good position and   unchanged when compared to previous study done 2017.                    ANIRUDH HUMPHREYS M.D., ATTENDING RADIOLOGIST  This document has been electronically signed. Sep 13 2017  9:37AM        < end of copied text >

## 2017-09-13 NOTE — PROGRESS NOTE ADULT - ATTENDING COMMENTS
Briefly, 63 y/o Creole speaking male who presented with abdominal pain and was found to be in low output heart failure with cardiogenic shock (EF 10%) underwent LVAD/TV annuloplasty for INTERMACS 2 9/12; postop had bleeding requiring PRBC, amicar, PLT, cryo. Inotropes titrated down. TTE done which showed LVEDD 5.9 cm, mild-mod MR, aortic valve closed, mild RV dysfunction, mild TR. Extubated. Doing well. Remains on milrinone 0.25.  - BP control; start hydral 10 mg q8h; goal MAP 70-80  - goal CVP 6-8; s/p bumex 1 mg today  - OOB to chair when possible  - continue milrinone for now  - serial venous sat  - anticoagulation and ASA per CT surgery; CT output improving

## 2017-09-13 NOTE — PROGRESS NOTE ADULT - SUBJECTIVE AND OBJECTIVE BOX
24H hour events:  extubated this AM, still somnolent    MEDICATIONS:  milrinone Infusion 0.25 MICROgram(s)/kG/Min IV Continuous <Continuous>  aspirin enteric coated 81 milliGRAM(s) Oral daily  heparin  Infusion 500 Unit(s)/Hr IV Continuous <Continuous>    fluconAZOLE IVPB 400 milliGRAM(s) IV Intermittent every 24 hours  vancomycin  IVPB 1000 milliGRAM(s) IV Intermittent every 12 hours  ciprofloxacin   IVPB 400 milliGRAM(s) IV Intermittent every 12 hours      metoclopramide Injectable 10 milliGRAM(s) IV Push every 8 hours    pantoprazole  Injectable 40 milliGRAM(s) IV Push daily    insulin lispro (HumaLOG) corrective regimen sliding scale   SubCutaneous three times a day before meals  insulin lispro (HumaLOG) corrective regimen sliding scale   SubCutaneous at bedtime    sodium chloride 0.45%. 1000 milliLiter(s) IV Continuous <Continuous>  potassium chloride  10 mEq/50 mL IVPB 10 milliEquivalent(s) IV Intermittent every 1 hour  potassium chloride  10 mEq/50 mL IVPB 10 milliEquivalent(s) IV Intermittent every 1 hour  potassium chloride  10 mEq/50 mL IVPB 10 milliEquivalent(s) IV Intermittent once        PHYSICAL EXAM:  T(C): 37.6 (09-13-17 @ 15:00), Max: 37.6 (09-13-17 @ 15:00)  HR: 95 (09-13-17 @ 15:00) (85 - 112)  BP: --  RR: 28 (09-13-17 @ 15:00) (12 - 37)  SpO2: 96% (09-13-17 @ 15:00) (92% - 100%)  Wt(kg): --  I&O's Summary    12 Sep 2017 07:01  -  13 Sep 2017 07:00  --------------------------------------------------------  IN: 3267.6 mL / OUT: 3610 mL / NET: -342.4 mL    13 Sep 2017 07:01  -  13 Sep 2017 15:26  --------------------------------------------------------  IN: 784 mL / OUT: 1380 mL / NET: -596 mL        Appearance: Normal	  HEENT:   Normal oral mucosa  Lymphatic: No lymphadenopathy  Cardiovascular: Normal S1 S2,         No JVD,      No murmurs,      No edema  Respiratory: Lungs clear to auscultation	  Psychiatry: Mood & affect appropriate  Gastrointestinal:  Soft, Non-tender	  Skin: No rashes, No ecchymoses, No cyanosis	  Neurologic: Non-focal  Extremities: Normal range of motion, No clubbing, cyanosis   Vascular: warm extremities        LABS:	 	    CBC Full  -  ( 13 Sep 2017 01:47 )  WBC Count : 9.1 K/uL  Hemoglobin : 9.0 g/dL  Hematocrit : 25.9 %  Platelet Count - Automated : 161 K/uL  Mean Cell Volume : 96.3 fl  Mean Cell Hemoglobin : 33.3 pg  Mean Cell Hemoglobin Concentration : 34.5 gm/dL  Auto Neutrophil # : x  Auto Lymphocyte # : x  Auto Monocyte # : x  Auto Eosinophil # : x  Auto Basophil # : x  Auto Neutrophil % : x  Auto Lymphocyte % : x  Auto Monocyte % : x  Auto Eosinophil % : x  Auto Basophil % : x    09-13    136  |  101  |  17  ----------------------------<  107<H>  4.7   |  21<L>  |  0.70  09-12    138  |  102  |  23  ----------------------------<  153<H>  3.9   |  18<L>  |  0.95    Ca    9.1      13 Sep 2017 01:47  Ca    9.4      12 Sep 2017 15:38    TPro  6.1  /  Alb  3.8  /  TBili  0.8  /  DBili  x   /  AST  88<H>  /  ALT  40  /  AlkPhos  52  09-13  TPro  6.0  /  Alb  3.9  /  TBili  0.7  /  DBili  x   /  AST  67<H>  /  ALT  35  /  AlkPhos  57  09-12      proBNP:     TELEMETRY: 	  some ectopy

## 2017-09-14 DIAGNOSIS — D50.0 IRON DEFICIENCY ANEMIA SECONDARY TO BLOOD LOSS (CHRONIC): ICD-10-CM

## 2017-09-14 LAB
ALBUMIN SERPL ELPH-MCNC: 3.8 G/DL — SIGNIFICANT CHANGE UP (ref 3.3–5)
ALBUMIN SERPL ELPH-MCNC: 4 G/DL — SIGNIFICANT CHANGE UP (ref 3.3–5)
ALP SERPL-CCNC: 57 U/L — SIGNIFICANT CHANGE UP (ref 40–120)
ALP SERPL-CCNC: 63 U/L — SIGNIFICANT CHANGE UP (ref 40–120)
ALT FLD-CCNC: 46 U/L RC — HIGH (ref 10–45)
ALT FLD-CCNC: 46 U/L RC — HIGH (ref 10–45)
ANION GAP SERPL CALC-SCNC: 14 MMOL/L — SIGNIFICANT CHANGE UP (ref 5–17)
ANION GAP SERPL CALC-SCNC: 15 MMOL/L — SIGNIFICANT CHANGE UP (ref 5–17)
APTT BLD: 24.9 SEC — LOW (ref 27.5–37.4)
APTT BLD: 31.9 SEC — SIGNIFICANT CHANGE UP (ref 27.5–37.4)
AST SERPL-CCNC: 117 U/L — HIGH (ref 10–40)
AST SERPL-CCNC: 99 U/L — HIGH (ref 10–40)
BASE EXCESS BLDV CALC-SCNC: 0 MMOL/L — SIGNIFICANT CHANGE UP (ref -2–2)
BASOPHILS # BLD AUTO: 0 K/UL — SIGNIFICANT CHANGE UP (ref 0–0.2)
BASOPHILS NFR BLD AUTO: 0.1 % — SIGNIFICANT CHANGE UP (ref 0–2)
BILIRUB SERPL-MCNC: 0.5 MG/DL — SIGNIFICANT CHANGE UP (ref 0.2–1.2)
BILIRUB SERPL-MCNC: 0.6 MG/DL — SIGNIFICANT CHANGE UP (ref 0.2–1.2)
BUN SERPL-MCNC: 23 MG/DL — SIGNIFICANT CHANGE UP (ref 7–23)
BUN SERPL-MCNC: 25 MG/DL — HIGH (ref 7–23)
CALCIUM SERPL-MCNC: 9.1 MG/DL — SIGNIFICANT CHANGE UP (ref 8.4–10.5)
CALCIUM SERPL-MCNC: 9.5 MG/DL — SIGNIFICANT CHANGE UP (ref 8.4–10.5)
CHLORIDE SERPL-SCNC: 93 MMOL/L — LOW (ref 96–108)
CHLORIDE SERPL-SCNC: 95 MMOL/L — LOW (ref 96–108)
CO2 BLDV-SCNC: 28 MMOL/L — SIGNIFICANT CHANGE UP (ref 22–30)
CO2 SERPL-SCNC: 20 MMOL/L — LOW (ref 22–31)
CO2 SERPL-SCNC: 26 MMOL/L — SIGNIFICANT CHANGE UP (ref 22–31)
CREAT SERPL-MCNC: 0.72 MG/DL — SIGNIFICANT CHANGE UP (ref 0.5–1.3)
CREAT SERPL-MCNC: 0.76 MG/DL — SIGNIFICANT CHANGE UP (ref 0.5–1.3)
EOSINOPHIL # BLD AUTO: 0 K/UL — SIGNIFICANT CHANGE UP (ref 0–0.5)
EOSINOPHIL NFR BLD AUTO: 0.1 % — SIGNIFICANT CHANGE UP (ref 0–6)
GAS PNL BLDA: SIGNIFICANT CHANGE UP
GLUCOSE SERPL-MCNC: 139 MG/DL — HIGH (ref 70–99)
GLUCOSE SERPL-MCNC: 185 MG/DL — HIGH (ref 70–99)
HAPTOGLOB SERPL-MCNC: 154 MG/DL — SIGNIFICANT CHANGE UP (ref 34–200)
HCO3 BLDV-SCNC: 26 MMOL/L — SIGNIFICANT CHANGE UP (ref 21–29)
HCT VFR BLD CALC: 13.1 % — CRITICAL LOW (ref 39–50)
HCT VFR BLD CALC: 26 % — LOW (ref 39–50)
HCT VFR BLD CALC: 26.3 % — LOW (ref 39–50)
HCT VFR BLD CALC: 29.4 % — LOW (ref 39–50)
HGB BLD-MCNC: 10 G/DL — LOW (ref 13–17)
HGB BLD-MCNC: 4.6 G/DL — CRITICAL LOW (ref 13–17)
HGB BLD-MCNC: 8.9 G/DL — LOW (ref 13–17)
HGB BLD-MCNC: 9.2 G/DL — LOW (ref 13–17)
HOROWITZ INDEX BLDV+IHG-RTO: 36 — SIGNIFICANT CHANGE UP
INR BLD: 1.17 RATIO — HIGH (ref 0.88–1.16)
INR BLD: 1.19 RATIO — HIGH (ref 0.88–1.16)
LDH SERPL L TO P-CCNC: 535 U/L — HIGH (ref 50–242)
LYMPHOCYTES # BLD AUTO: 0.8 K/UL — LOW (ref 1–3.3)
LYMPHOCYTES # BLD AUTO: 4.3 % — LOW (ref 13–44)
MCHC RBC-ENTMCNC: 32.7 PG — SIGNIFICANT CHANGE UP (ref 27–34)
MCHC RBC-ENTMCNC: 33 PG — SIGNIFICANT CHANGE UP (ref 27–34)
MCHC RBC-ENTMCNC: 33.4 PG — SIGNIFICANT CHANGE UP (ref 27–34)
MCHC RBC-ENTMCNC: 33.8 GM/DL — SIGNIFICANT CHANGE UP (ref 32–36)
MCHC RBC-ENTMCNC: 34.2 GM/DL — SIGNIFICANT CHANGE UP (ref 32–36)
MCHC RBC-ENTMCNC: 34.7 GM/DL — SIGNIFICANT CHANGE UP (ref 32–36)
MCHC RBC-ENTMCNC: 34.7 PG — HIGH (ref 27–34)
MCHC RBC-ENTMCNC: 34.8 GM/DL — SIGNIFICANT CHANGE UP (ref 32–36)
MCV RBC AUTO: 100 FL — SIGNIFICANT CHANGE UP (ref 80–100)
MCV RBC AUTO: 96.1 FL — SIGNIFICANT CHANGE UP (ref 80–100)
MCV RBC AUTO: 96.5 FL — SIGNIFICANT CHANGE UP (ref 80–100)
MCV RBC AUTO: 96.6 FL — SIGNIFICANT CHANGE UP (ref 80–100)
MONOCYTES # BLD AUTO: 1.9 K/UL — HIGH (ref 0–0.9)
MONOCYTES NFR BLD AUTO: 11 % — SIGNIFICANT CHANGE UP (ref 2–14)
NEUTROPHILS # BLD AUTO: 14.9 K/UL — HIGH (ref 1.8–7.4)
NEUTROPHILS NFR BLD AUTO: 84.6 % — HIGH (ref 43–77)
PCO2 BLDV: 52 MMHG — HIGH (ref 35–50)
PH BLDV: 7.32 — LOW (ref 7.35–7.45)
PLAT MORPH BLD: NORMAL — SIGNIFICANT CHANGE UP
PLATELET # BLD AUTO: 121 K/UL — LOW (ref 150–400)
PLATELET # BLD AUTO: 130 K/UL — LOW (ref 150–400)
PLATELET # BLD AUTO: 139 K/UL — LOW (ref 150–400)
PLATELET # BLD AUTO: 2 K/UL — CRITICAL LOW (ref 150–400)
PO2 BLDV: 45 MMHG — SIGNIFICANT CHANGE UP (ref 25–45)
POTASSIUM SERPL-MCNC: 4.7 MMOL/L — SIGNIFICANT CHANGE UP (ref 3.5–5.3)
POTASSIUM SERPL-MCNC: 5.1 MMOL/L — SIGNIFICANT CHANGE UP (ref 3.5–5.3)
POTASSIUM SERPL-SCNC: 4.7 MMOL/L — SIGNIFICANT CHANGE UP (ref 3.5–5.3)
POTASSIUM SERPL-SCNC: 5.1 MMOL/L — SIGNIFICANT CHANGE UP (ref 3.5–5.3)
PROT SERPL-MCNC: 6.5 G/DL — SIGNIFICANT CHANGE UP (ref 6–8.3)
PROT SERPL-MCNC: 7.1 G/DL — SIGNIFICANT CHANGE UP (ref 6–8.3)
PROTHROM AB SERPL-ACNC: 12.7 SEC — SIGNIFICANT CHANGE UP (ref 9.8–12.7)
PROTHROM AB SERPL-ACNC: 13 SEC — HIGH (ref 9.8–12.7)
RBC # BLD: 1.31 M/UL — LOW (ref 4.2–5.8)
RBC # BLD: 2.69 M/UL — LOW (ref 4.2–5.8)
RBC # BLD: 2.74 M/UL — LOW (ref 4.2–5.8)
RBC # BLD: 3.05 M/UL — LOW (ref 4.2–5.8)
RBC # FLD: 14.2 % — SIGNIFICANT CHANGE UP (ref 10.3–14.5)
RBC # FLD: 14.4 % — SIGNIFICANT CHANGE UP (ref 10.3–14.5)
RBC # FLD: 14.6 % — HIGH (ref 10.3–14.5)
RBC # FLD: 14.9 % — HIGH (ref 10.3–14.5)
RBC BLD AUTO: SIGNIFICANT CHANGE UP
SAO2 % BLDV: 76 % — SIGNIFICANT CHANGE UP (ref 67–88)
SODIUM SERPL-SCNC: 130 MMOL/L — LOW (ref 135–145)
SODIUM SERPL-SCNC: 133 MMOL/L — LOW (ref 135–145)
WBC # BLD: 17.3 K/UL — HIGH (ref 3.8–10.5)
WBC # BLD: 17.4 K/UL — HIGH (ref 3.8–10.5)
WBC # BLD: 18.8 K/UL — HIGH (ref 3.8–10.5)
WBC # BLD: 9.2 K/UL — SIGNIFICANT CHANGE UP (ref 3.8–10.5)
WBC # FLD AUTO: 17.3 K/UL — HIGH (ref 3.8–10.5)
WBC # FLD AUTO: 17.4 K/UL — HIGH (ref 3.8–10.5)
WBC # FLD AUTO: 18.8 K/UL — HIGH (ref 3.8–10.5)
WBC # FLD AUTO: 9.2 K/UL — SIGNIFICANT CHANGE UP (ref 3.8–10.5)

## 2017-09-14 PROCEDURE — 93750 INTERROGATION VAD IN PERSON: CPT

## 2017-09-14 PROCEDURE — 99233 SBSQ HOSP IP/OBS HIGH 50: CPT | Mod: 25,GC

## 2017-09-14 PROCEDURE — 71010: CPT | Mod: 26

## 2017-09-14 RX ORDER — HYDROMORPHONE HYDROCHLORIDE 2 MG/ML
0.5 INJECTION INTRAMUSCULAR; INTRAVENOUS; SUBCUTANEOUS ONCE
Qty: 0 | Refills: 0 | Status: DISCONTINUED | OUTPATIENT
Start: 2017-09-14 | End: 2017-09-14

## 2017-09-14 RX ORDER — AMINOCAPROIC ACID 500 MG/1
5 TABLET ORAL
Qty: 5 | Refills: 0 | Status: COMPLETED | OUTPATIENT
Start: 2017-09-14 | End: 2017-09-14

## 2017-09-14 RX ORDER — HYDROMORPHONE HYDROCHLORIDE 2 MG/ML
1 INJECTION INTRAMUSCULAR; INTRAVENOUS; SUBCUTANEOUS ONCE
Qty: 0 | Refills: 0 | Status: DISCONTINUED | OUTPATIENT
Start: 2017-09-14 | End: 2017-09-14

## 2017-09-14 RX ORDER — HYDRALAZINE HCL 50 MG
10 TABLET ORAL EVERY 8 HOURS
Qty: 0 | Refills: 0 | Status: DISCONTINUED | OUTPATIENT
Start: 2017-09-14 | End: 2017-09-19

## 2017-09-14 RX ORDER — HYDRALAZINE HCL 50 MG
10 TABLET ORAL ONCE
Qty: 0 | Refills: 0 | Status: COMPLETED | OUTPATIENT
Start: 2017-09-14 | End: 2017-09-14

## 2017-09-14 RX ORDER — BUMETANIDE 0.25 MG/ML
1 INJECTION INTRAMUSCULAR; INTRAVENOUS ONCE
Qty: 0 | Refills: 0 | Status: COMPLETED | OUTPATIENT
Start: 2017-09-14 | End: 2017-09-14

## 2017-09-14 RX ORDER — HYDRALAZINE HCL 50 MG
10 TABLET ORAL ONCE
Qty: 0 | Refills: 0 | Status: DISCONTINUED | OUTPATIENT
Start: 2017-09-14 | End: 2017-09-14

## 2017-09-14 RX ADMIN — HYDROMORPHONE HYDROCHLORIDE 0.5 MILLIGRAM(S): 2 INJECTION INTRAMUSCULAR; INTRAVENOUS; SUBCUTANEOUS at 01:07

## 2017-09-14 RX ADMIN — Medication 250 MILLIGRAM(S): at 20:37

## 2017-09-14 RX ADMIN — Medication 250 MILLIGRAM(S): at 09:58

## 2017-09-14 RX ADMIN — Medication 10 MILLIGRAM(S): at 21:08

## 2017-09-14 RX ADMIN — POLYETHYLENE GLYCOL 3350 17 GRAM(S): 17 POWDER, FOR SOLUTION ORAL at 15:29

## 2017-09-14 RX ADMIN — HYDROMORPHONE HYDROCHLORIDE 0.5 MILLIGRAM(S): 2 INJECTION INTRAMUSCULAR; INTRAVENOUS; SUBCUTANEOUS at 06:45

## 2017-09-14 RX ADMIN — PANTOPRAZOLE SODIUM 40 MILLIGRAM(S): 20 TABLET, DELAYED RELEASE ORAL at 15:29

## 2017-09-14 RX ADMIN — HYDROMORPHONE HYDROCHLORIDE 0.5 MILLIGRAM(S): 2 INJECTION INTRAMUSCULAR; INTRAVENOUS; SUBCUTANEOUS at 09:29

## 2017-09-14 RX ADMIN — HYDROMORPHONE HYDROCHLORIDE 0.5 MILLIGRAM(S): 2 INJECTION INTRAMUSCULAR; INTRAVENOUS; SUBCUTANEOUS at 07:00

## 2017-09-14 RX ADMIN — BUMETANIDE 1 MILLIGRAM(S): 0.25 INJECTION INTRAMUSCULAR; INTRAVENOUS at 21:08

## 2017-09-14 RX ADMIN — Medication 10 MILLIGRAM(S): at 12:58

## 2017-09-14 RX ADMIN — Medication 10 MILLIGRAM(S): at 23:09

## 2017-09-14 RX ADMIN — Medication 100 MILLIGRAM(S): at 06:04

## 2017-09-14 RX ADMIN — AMINOCAPROIC ACID 250 GM/HR: 500 TABLET ORAL at 12:57

## 2017-09-14 RX ADMIN — Medication 200 MILLIGRAM(S): at 20:37

## 2017-09-14 RX ADMIN — HYDROMORPHONE HYDROCHLORIDE 0.5 MILLIGRAM(S): 2 INJECTION INTRAMUSCULAR; INTRAVENOUS; SUBCUTANEOUS at 01:22

## 2017-09-14 RX ADMIN — BUMETANIDE 1 MILLIGRAM(S): 0.25 INJECTION INTRAMUSCULAR; INTRAVENOUS at 11:00

## 2017-09-14 RX ADMIN — Medication 200 MILLIGRAM(S): at 09:50

## 2017-09-14 RX ADMIN — Medication 100 MILLIGRAM(S): at 16:44

## 2017-09-14 RX ADMIN — Medication 100 MILLIGRAM(S): at 21:08

## 2017-09-14 RX ADMIN — Medication 10 MILLIGRAM(S): at 06:04

## 2017-09-14 RX ADMIN — OXYCODONE AND ACETAMINOPHEN 2 TABLET(S): 5; 325 TABLET ORAL at 23:38

## 2017-09-14 RX ADMIN — Medication 81 MILLIGRAM(S): at 15:28

## 2017-09-14 RX ADMIN — FLUCONAZOLE 100 MILLIGRAM(S): 150 TABLET ORAL at 08:25

## 2017-09-14 RX ADMIN — HYDROMORPHONE HYDROCHLORIDE 1 MILLIGRAM(S): 2 INJECTION INTRAMUSCULAR; INTRAVENOUS; SUBCUTANEOUS at 17:56

## 2017-09-14 RX ADMIN — HYDROMORPHONE HYDROCHLORIDE 0.5 MILLIGRAM(S): 2 INJECTION INTRAMUSCULAR; INTRAVENOUS; SUBCUTANEOUS at 09:50

## 2017-09-14 RX ADMIN — HYDROMORPHONE HYDROCHLORIDE 1 MILLIGRAM(S): 2 INJECTION INTRAMUSCULAR; INTRAVENOUS; SUBCUTANEOUS at 17:38

## 2017-09-14 NOTE — PROGRESS NOTE ADULT - PROBLEM SELECTOR PLAN 1
patient is off bronchodilators at this time. He has not been wheezing  but does have underlying COPD: Suggest to add spiriva, if it is ok with primary team. Keep his o2 sao2 above 88%. Chest radiograph reviewed, and I think it is atelectasis rather then pneumonia at left base: Pt has no leucocytosis and T Max of 99.7. Patient is on multiple broad spectrum antibiotics!!

## 2017-09-14 NOTE — PHYSICAL THERAPY INITIAL EVALUATION ADULT - ADDITIONAL COMMENTS
Pt lives alone in private house, 3 steps to enter. Was independent ambulator without assistive device. 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
Pt lives alone in private house, 3 steps to enter. Was independent ambulator without assistive device. 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

## 2017-09-14 NOTE — PROGRESS NOTE ADULT - ASSESSMENT
62 year old male s/p LVAD implantation, TV repair - extubated, hemodynamically stable with stable LVAD parameters stable  1. Aspirin 81mg  2. Heparin drip, f/u with Dr. Cunningham for up titrating heparin and coumadin dosing   3. wean primacor as tolerated, f/u with heart failure  4. GI ppx  5. VTE ppx  6. pain control  7. blood sugar management   8. OOB to chair

## 2017-09-14 NOTE — PHYSICAL THERAPY INITIAL EVALUATION ADULT - IMPAIRMENTS FOUND, PT EVAL
gait, locomotion, and balance/aerobic capacity/endurance
gait, locomotion, and balance/aerobic capacity/endurance

## 2017-09-14 NOTE — PHYSICAL THERAPY INITIAL EVALUATION ADULT - RANGE OF MOTION EXAMINATION, REHAB EVAL
bilateral upper extremity ROM was WFL (within functional limits)/bilateral lower extremity ROM was WFL (within functional limits)
bilateral upper extremity ROM was WFL (within functional limits)/bilateral lower extremity ROM was WFL (within functional limits)

## 2017-09-14 NOTE — PHYSICAL THERAPY INITIAL EVALUATION ADULT - GENERAL OBSERVATIONS, REHAB EVAL
Lying in bed + [place], Drive line (VAD), IJ, Chest tube x 4, [place]
Received pt bedside, +tele. /79, HR 92, O2 sats 95% on room air.

## 2017-09-14 NOTE — PROGRESS NOTE ADULT - ASSESSMENT
62M NICM  (LVDD 6.7 cm) HFrEF, (LVEF 10%), ACC/AHA stage D with NYHA class IV symptoms, pw abd pain, elevated lactic acid. Initial RHC with depressed CI (0.6).     Now with bloody fluid in chest tube    ADHF , s/p LVAD 9/12/17  p~100,  MAP= 80-90  126 (9/14)--> 149 (9/15)  JVP ~20  - milrinone @ 0.25  - cont bumex 1mg QD  -repeat TTE yesterday  ok    + LV thrombus, embolized  - heparin gtt    ?CAD  - atorvastatin 20mg    Abd pain  - on pantoprazole 40 BID,  -sucralfate

## 2017-09-14 NOTE — PROVIDER CONTACT NOTE (CRITICAL VALUE NOTIFICATION) - RECOMMENDATIONS
decrease Epi
Cardiac team aware blood drawn from chest tube, by Dr. Cunningham to assess contents of pleural drainage. No treatment needed for results
HOLD FOR ONE HR THEN DECREASE [id number]
Stop infusion for 60 minutes, restart at 10 ml/hr as per nomogram orderset.
will continue monitor
will follow heparin gtt nomogram protocol

## 2017-09-14 NOTE — PROVIDER CONTACT NOTE (CRITICAL VALUE NOTIFICATION) - BACKGROUND
s/p LVAD implantation, last lactate 5
Pt s/p from LVAD 9/12. Excessive bleeding in pleural chest tube
ON HEPARINE GTTS
Patient admitted for chest pain, SOB, RICKEY (-) x3
Pt admitted with epigastric pain, Echo performed today found an LV thrombus, pt started on heparin drip running at 12 cc/hr, PTT critical.
pt admitted for chest pain, sob, monet(-)x 5

## 2017-09-14 NOTE — PROVIDER CONTACT NOTE (CRITICAL VALUE NOTIFICATION) - SITUATION
Lactate 5.2
aptt more than 200
Fluid drawn from Pleural chest tube by Dr. Cunningham
LHC449.4
Pt's PTT on heparin resulted with the PTT being over 200
Ptt 136.9

## 2017-09-14 NOTE — PROVIDER CONTACT NOTE (CRITICAL VALUE NOTIFICATION) - ASSESSMENT
on epi gtts
Vitals stable , no complaints of dizziness,  Saturation 100% 4 L NC
NOM S/S BLEEDING
Pt A+O x4, no s/s of bleeding present.
Pt is a+ox4, pt denies pain and distress. No s/s of bleeding noted.
pt is a+ox4, pt denies s/s of bleeding, no s/s of bleeding noted.

## 2017-09-14 NOTE — PROGRESS NOTE ADULT - SUBJECTIVE AND OBJECTIVE BOX
24H hour events:  No acute events in last 24 hours. Patient seen in bed, states feels well, improved since admission.     MEDICATIONS:  milrinone Infusion 0.25 MICROgram(s)/kG/Min IV Continuous <Continuous>  aspirin enteric coated 81 milliGRAM(s) Oral daily  hydrALAZINE 10 milliGRAM(s) Oral every 8 hours    fluconAZOLE IVPB 400 milliGRAM(s) IV Intermittent every 24 hours  vancomycin  IVPB 1000 milliGRAM(s) IV Intermittent every 12 hours  ciprofloxacin   IVPB 400 milliGRAM(s) IV Intermittent every 12 hours      oxyCODONE    5 mG/acetaminophen 325 mG 1 Tablet(s) Oral every 4 hours PRN  oxyCODONE    5 mG/acetaminophen 325 mG 2 Tablet(s) Oral every 4 hours PRN  HYDROmorphone  Injectable 1 milliGRAM(s) IV Push once    pantoprazole  Injectable 40 milliGRAM(s) IV Push daily  docusate sodium 100 milliGRAM(s) Oral three times a day  polyethylene glycol 3350 17 Gram(s) Oral daily    insulin lispro (HumaLOG) corrective regimen sliding scale   SubCutaneous three times a day before meals  insulin lispro (HumaLOG) corrective regimen sliding scale   SubCutaneous at bedtime    sodium chloride 0.45%. 1000 milliLiter(s) IV Continuous <Continuous>  potassium chloride  10 mEq/50 mL IVPB 10 milliEquivalent(s) IV Intermittent every 1 hour  potassium chloride  10 mEq/50 mL IVPB 10 milliEquivalent(s) IV Intermittent every 1 hour  potassium chloride  10 mEq/50 mL IVPB 10 milliEquivalent(s) IV Intermittent once        PHYSICAL EXAM:  T(C): 37.1 (09-14-17 @ 11:00), Max: 37.5 (09-14-17 @ 07:30)  HR: 93 (09-14-17 @ 14:00) (93 - 103)  BP: --  RR: 19 (09-14-17 @ 14:00) (9 - 24)  SpO2: 100% (09-14-17 @ 14:00) (81% - 100%)  Wt(kg): --  I&O's Summary    13 Sep 2017 07:01  -  14 Sep 2017 07:00  --------------------------------------------------------  IN: 1540.4 mL / OUT: 3140 mL / NET: -1599.6 mL    14 Sep 2017 07:01  -  14 Sep 2017 15:21  --------------------------------------------------------  IN: 1127 mL / OUT: 2035 mL / NET: -908 mL        Appearance: Normal	  HEENT:   Normal oral mucosa  Lymphatic: No lymphadenopathy  Cardiovascular: Normal S1 S2,         No JVD,      No murmurs,      No edema  Respiratory: Lungs clear to auscultation	  Psychiatry: Mood & affect appropriate  Gastrointestinal:  Soft, Non-tender	  Skin: No rashes, No ecchymoses, No cyanosis	  Neurologic: Non-focal  Extremities: Normal range of motion, No clubbing, cyanosis   Vascular: warm extremities        LABS:	 	    CBC Full  -  ( 14 Sep 2017 09:09 )  WBC Count : 9.2 K/uL  Hemoglobin : 4.6 g/dL  Hematocrit : 13.1 %  Platelet Count - Automated : 2 K/uL  Mean Cell Volume : 100.0 fl  Mean Cell Hemoglobin : 34.7 pg  Mean Cell Hemoglobin Concentration : 34.7 gm/dL  Auto Neutrophil # : x  Auto Lymphocyte # : x  Auto Monocyte # : x  Auto Eosinophil # : x  Auto Basophil # : x  Auto Neutrophil % : x  Auto Lymphocyte % : x  Auto Monocyte % : x  Auto Eosinophil % : x  Auto Basophil % : x    09-14    130<L>  |  95<L>  |  23  ----------------------------<  185<H>  5.1   |  20<L>  |  0.76  09-13    136  |  101  |  17  ----------------------------<  107<H>  4.7   |  21<L>  |  0.70    Ca    9.1      14 Sep 2017 01:06  Ca    9.1      13 Sep 2017 01:47    TPro  6.5  /  Alb  3.8  /  TBili  0.5  /  DBili  x   /  AST  117<H>  /  ALT  46<H>  /  AlkPhos  57  09-14  TPro  6.1  /  Alb  3.8  /  TBili  0.8  /  DBili  x   /  AST  88<H>  /  ALT  40  /  AlkPhos  52  09-13      proBNP:     TELEMETRY: 	  rare ectopy

## 2017-09-14 NOTE — PROGRESS NOTE ADULT - SUBJECTIVE AND OBJECTIVE BOX
Patient is a 62y old  Male who presents with a chief complaint of SOOB (25 Aug 2017 22:27)    events noted  blood in left sided chest tube  getting blood transfusion  denies any SOB   no chest pain   ? feels depressed   severely anemic       Any change in ROS:     MEDICATIONS  (STANDING):  sodium chloride 0.45%. 1000 milliLiter(s) (10 mL/Hr) IV Continuous <Continuous>  pantoprazole  Injectable 40 milliGRAM(s) IV Push daily  potassium chloride  10 mEq/50 mL IVPB 10 milliEquivalent(s) IV Intermittent every 1 hour  potassium chloride  10 mEq/50 mL IVPB 10 milliEquivalent(s) IV Intermittent every 1 hour  potassium chloride  10 mEq/50 mL IVPB 10 milliEquivalent(s) IV Intermittent once  milrinone Infusion 0.25 MICROgram(s)/kG/Min (4.5 mL/Hr) IV Continuous <Continuous>  fluconAZOLE IVPB 400 milliGRAM(s) IV Intermittent every 24 hours  vancomycin  IVPB 1000 milliGRAM(s) IV Intermittent every 12 hours  ciprofloxacin   IVPB 400 milliGRAM(s) IV Intermittent every 12 hours  aspirin enteric coated 81 milliGRAM(s) Oral daily  insulin lispro (HumaLOG) corrective regimen sliding scale   SubCutaneous three times a day before meals  insulin lispro (HumaLOG) corrective regimen sliding scale   SubCutaneous at bedtime  docusate sodium 100 milliGRAM(s) Oral three times a day  polyethylene glycol 3350 17 Gram(s) Oral daily    MEDICATIONS  (PRN):  oxyCODONE    5 mG/acetaminophen 325 mG 1 Tablet(s) Oral every 4 hours PRN Moderate Pain (4 - 6)  oxyCODONE    5 mG/acetaminophen 325 mG 2 Tablet(s) Oral every 4 hours PRN Severe Pain (7 - 10)    Vital Signs Last 24 Hrs  T(C): 37.5 (14 Sep 2017 07:30), Max: 37.6 (13 Sep 2017 15:00)  T(F): 99.5 (14 Sep 2017 07:30), Max: 99.7 (13 Sep 2017 15:00)  HR: 101 (14 Sep 2017 09:00) (92 - 103)  BP: --  BP(mean): --  RR: 15 (14 Sep 2017 09:00) (9 - 28)  SpO2: 91% (14 Sep 2017 09:00) (85% - 100%)    I&O's Summary    13 Sep 2017 07:01  -  14 Sep 2017 07:00  --------------------------------------------------------  IN: 1540.4 mL / OUT: 3140 mL / NET: -1599.6 mL    14 Sep 2017 07:01  -  14 Sep 2017 09:55  --------------------------------------------------------  IN: 539 mL / OUT: 640 mL / NET: -101 mL          Physical Exam:   GENERAL: looks weak  HEENT: ALONSO/   Atraumatic, Normocephalic  ENMT: No tonsillar erythema, exudates, or enlargement; Moist mucous membranes, Good dentition, No lesions  NECK: Supple, No JVD, Normal thyroid  CHEST/LUNG: Decreased air entry bilaterally   CVS: Regular rate and rhythm; No murmurs, rubs, or gallops  GI: : Soft, Nontender, Nondistended; Bowel sounds present  NERVOUS SYSTEM:  Alert & Oriented X3  EXTREMITIES:  2+ Peripheral Pulses, No clubbing, cyanosis, or edema  LYMPH: No lymphadenopathy noted  SKIN: No rashes or lesions  ENDOCRINOLOGY: No Thyromegaly  PSYCH: Appropriate    Labs:  ABG - ( 14 Sep 2017 07:43 )  pH: 7.39  /  pCO2: 42    /  pO2: 113   / HCO3: 25    / Base Excess: .4    /  SaO2: 99                            4.6    9.2   )-----------( 2        ( 14 Sep 2017 09:09 )             13.1                         8.9    18.8  )-----------( 130      ( 14 Sep 2017 08:46 )             26.0                         9.2    17.3  )-----------( 139      ( 14 Sep 2017 01:06 )             26.3                         9.0    9.1   )-----------( 161      ( 13 Sep 2017 01:47 )             25.9                         9.3    13.0  )-----------( 182      ( 12 Sep 2017 19:11 )             27.9                         9.1    14.1  )-----------( 177      ( 12 Sep 2017 15:38 )             27.6                         13.2   5.4   )-----------( 166      ( 12 Sep 2017 02:13 )             39.2                         13.5   6.0   )-----------( 159      ( 11 Sep 2017 03:34 )             39.4     09-14    130<L>  |  95<L>  |  23  ----------------------------<  185<H>  5.1   |  20<L>  |  0.76  09-13    136  |  101  |  17  ----------------------------<  107<H>  4.7   |  21<L>  |  0.70  09-12    138  |  102  |  23  ----------------------------<  153<H>  3.9   |  18<L>  |  0.95  09-12    134<L>  |  98  |  27<H>  ----------------------------<  119<H>  4.3   |  23  |  1.07  09-11    134<L>  |  98  |  19  ----------------------------<  101<H>  5.0   |  22  |  0.86    Ca    9.1      14 Sep 2017 01:06  Ca    9.1      13 Sep 2017 01:47  Ca    9.4      12 Sep 2017 15:38    TPro  6.5  /  Alb  3.8  /  TBili  0.5  /  DBili  x   /  AST  117<H>  /  ALT  46<H>  /  AlkPhos  57  09-14  TPro  6.1  /  Alb  3.8  /  TBili  0.8  /  DBili  x   /  AST  88<H>  /  ALT  40  /  AlkPhos  52  09-13  TPro  6.0  /  Alb  3.9  /  TBili  0.7  /  DBili  x   /  AST  67<H>  /  ALT  35  /  AlkPhos  57  09-12  TPro  7.3  /  Alb  3.7  /  TBili  0.4  /  DBili  x   /  AST  34  /  ALT  52<H>  /  AlkPhos  97  09-12  TPro  7.4  /  Alb  3.7  /  TBili  0.6  /  DBili  x   /  AST  50<H>  /  ALT  63<H>  /  AlkPhos  97  09-11    CAPILLARY BLOOD GLUCOSE  137 (14 Sep 2017 08:00)          LIVER FUNCTIONS - ( 14 Sep 2017 01:06 )  Alb: 3.8 g/dL / Pro: 6.5 g/dL / ALK PHOS: 57 U/L / ALT: 46 U/L RC / AST: 117 U/L / GGT: x           PT/INR - ( 14 Sep 2017 08:46 )   PT: 12.7 sec;   INR: 1.17 ratio         PTT - ( 14 Sep 2017 08:46 )  PTT:24.9 sec    Cultures:         < from: Xray Chest 1 View AP/PA (09.13.17 @ 03:36) >  EXAM:  CHEST SINGLE AP OR PA                            PROCEDURE DATE:  09/13/2017            INTERPRETATION:  A single chest x-ray was obtained on September 13, 2017.    Indication: Status post cardiac surgery.    Impression:    The heart is enlarged. Left lower lobe pneumonia and/or atelectasis. The   right lung is clear. All life supporting devices are in good position and   unchanged when compared to previous study done September 12, 2017.                    ANIRUDH HUMPHREYS M.D., ATTENDING RADIOLOGIST  This document has been electronically signed. Sep 13 2017  9:37AM    < end of copied text >        today's chest radiograph: left retrocardiac atelectasis               Studies  Chest X-RAY  CT SCAN Chest   Venous Dopplers: LE:   CT Abdomen  Others

## 2017-09-14 NOTE — PROGRESS NOTE ADULT - SUBJECTIVE AND OBJECTIVE BOX
Operation: LVAD implantation, TVrepair   POD: 2  Narrative: patient resting comfortably in bed    Vital Signs Last 24 Hrs  T(C): 36.6 (14 Sep 2017 04:00), Max: 37.6 (13 Sep 2017 15:00)  T(F): 97.9 (14 Sep 2017 04:00), Max: 99.7 (13 Sep 2017 15:00)  HR: 96 (14 Sep 2017 04:00) (87 - 104)  BP: --  BP(mean): --  RR: 15 (14 Sep 2017 04:00) (9 - 31)  SpO2: 99% (14 Sep 2017 04:00) (94% - 100%)  I&O's Detail    12 Sep 2017 07:01  -  13 Sep 2017 07:00  --------------------------------------------------------  IN:    aminocaproic acid Infusion: 250 mL    Cryoprecipitate: 150 mL    DOBUTamine Infusion: 40.5 mL    DOBUTamine Infusion: 36 mL    EPINEPHrine Infusion: 52 mL    EPINEPHrine Infusion: 2 mL    FFP (Fresh Frozen Plasma): 500 mL    insulin Infusion: 49 mL    milrinone  Infusion: 54 mL    milrinone  Infusion: 13.5 mL    milrinone  Infusion: 6.8 mL    norepinephrine Infusion: 20.2 mL    Packed Red Blood Cells: 850 mL    Platelets - Single Donor: 300 mL    propofol Infusion: 93.6 mL    sodium chloride 0.45%.: 150 mL    Solution: 700 mL  Total IN: 3267.6 mL    OUT:    Chest Tube: 330 mL    Chest Tube: 200 mL    Chest Tube: 390 mL    Chest Tube: 810 mL    Indwelling Catheter - Urethral: 1880 mL  Total OUT: 3610 mL    Total NET: -342.4 mL      13 Sep 2017 07:01  -  14 Sep 2017 04:21  --------------------------------------------------------  IN:    DOBUTamine Infusion: 18 mL    heparin Infusion: 70 mL    milrinone  Infusion: 94.5 mL    sodium chloride 0.45%.: 210 mL    Solution: 850 mL  Total IN: 1242.5 mL    OUT:    Chest Tube: 80 mL    Chest Tube: 140 mL    Chest Tube: 70 mL    Chest Tube: 220 mL    Indwelling Catheter - Urethral: 2015 mL  Total OUT: 2525 mL    Total NET: -1282.5 mL    LABS:                        9.2    17.3  )-----------( 139      ( 14 Sep 2017 01:06 )             26.3     09-14    130<L>  |  95<L>  |  23  ----------------------------<  185<H>  5.1   |  20<L>  |  0.76    Ca    9.1      14 Sep 2017 01:06    TPro  6.5  /  Alb  3.8  /  TBili  0.5  /  DBili  x   /  AST  117<H>  /  ALT  46<H>  /  AlkPhos  57  09-14    PT/INR - ( 14 Sep 2017 01:06 )   PT: 13.0 sec;   INR: 1.19 ratio         PTT - ( 14 Sep 2017 03:48 )  PTT:31.9 sec  ABG - ( 14 Sep 2017 01:05 )  pH: 7.42  /  pCO2: 35    /  pO2: 78    / HCO3: 22    / Base Excess: -1.1  /  SaO2: 96              MEDICATIONS  (STANDING):  sodium chloride 0.45%. 1000 milliLiter(s) (10 mL/Hr) IV Continuous <Continuous>  pantoprazole  Injectable 40 milliGRAM(s) IV Push daily  potassium chloride  10 mEq/50 mL IVPB 10 milliEquivalent(s) IV Intermittent every 1 hour  potassium chloride  10 mEq/50 mL IVPB 10 milliEquivalent(s) IV Intermittent every 1 hour  potassium chloride  10 mEq/50 mL IVPB 10 milliEquivalent(s) IV Intermittent once  metoclopramide Injectable 10 milliGRAM(s) IV Push every 8 hours  milrinone Infusion 0.25 MICROgram(s)/kG/Min (4.5 mL/Hr) IV Continuous <Continuous>  fluconAZOLE IVPB 400 milliGRAM(s) IV Intermittent every 24 hours  vancomycin  IVPB 1000 milliGRAM(s) IV Intermittent every 12 hours  ciprofloxacin   IVPB 400 milliGRAM(s) IV Intermittent every 12 hours  aspirin enteric coated 81 milliGRAM(s) Oral daily  insulin lispro (HumaLOG) corrective regimen sliding scale   SubCutaneous three times a day before meals  insulin lispro (HumaLOG) corrective regimen sliding scale   SubCutaneous at bedtime  heparin  Infusion 500 Unit(s)/Hr (5 mL/Hr) IV Continuous <Continuous>  docusate sodium 100 milliGRAM(s) Oral three times a day  polyethylene glycol 3350 17 Gram(s) Oral daily    MEDICATIONS  (PRN):  oxyCODONE    5 mG/acetaminophen 325 mG 1 Tablet(s) Oral every 4 hours PRN Moderate Pain (4 - 6)  oxyCODONE    5 mG/acetaminophen 325 mG 2 Tablet(s) Oral every 4 hours PRN Severe Pain (7 - 10)      General: Alert and orientedx3, in no acute distress  Chest: sternal dressing C/D/I  Heart: regular rate and rhythm, LVAD sounds noted  Lungs: clear to auscultation B/L, without wheezes, rhonci, rales  Abdomen: Soft, non distended, non tender, positive bowel sounds  Extremeties: without edema B/L LE, positive DP pulses B/L     Does the patient have a history of CHF: yes  -If yes, systolic or diastolic: BiV dysfunction   -pre-operative EF: 10-15%    Extubation within 24 hours: yes    Does the patient have a history of kidney disease: no  -give CKD stage if applicable:  -what is patient's baseline Creatinine: 1.14    Beta Blockers contraindicated for the first 24 hours due to vasopressor/inotrpic medication: yes  -If on pressors, indication: inotropic support    Did the patient receive transfusion of blood and/or products: yes  -If yes, indication: anemia-blood loss post op    DVT PPX: scd    Wilma-operative antibiotics discontinued within 48 hours of CTU admission:  -name/date/time of discontinue  -diflucan/9-15-17/09:30  -cipro/9-15-17/09:30  -vanco/9-15-17/21:30      Patient care discussed on morning interdisciplinary rounds with CTS team.

## 2017-09-14 NOTE — PHYSICAL THERAPY INITIAL EVALUATION ADULT - PLANNED THERAPY INTERVENTIONS, PT EVAL
gait training/stairs negotiation/transfer training/bed mobility training
balance training/gait training/stair training

## 2017-09-14 NOTE — PROGRESS NOTE ADULT - ATTENDING COMMENTS
Briefly, 63 y/o Creole speaking male who presented with abdominal pain and was found to be in low output heart failure with cardiogenic shock (EF 10%) underwent LVAD/TV annuloplasty for INTERMACS 2 9/12; postop had bleeding requiring PRBC, amicar, PLT, cryo. Inotropes titrated down. TTE done which showed LVEDD 5.9 cm, mild-mod MR, aortic valve closed, mild RV dysfunction, mild TR. Extubated. Doing well. Remains on milrinone 0.25. Had some further bleeding while on anticoagulation from left chest tube which slowed down with Amicar. CVP elevated. CXR with L pleural effusion (? blood).   - BP control; start hydral 10 mg q8h; goal MAP 70-80  - goal CVP 6-8; s/p bumex 1 mg q12  - OOB to chair when possible  - continue milrinone for now  - serial venous sat  - monitor H/H closely for bleeding; hold anticoagulation for now

## 2017-09-14 NOTE — PHYSICAL THERAPY INITIAL EVALUATION ADULT - PERTINENT HX OF CURRENT PROBLEM, REHAB EVAL
62 y/oM admitted 8/25 with chest pain x 1wk. Pleuritic, worsens with cough and deep inspiration. +CHF w/ EF of 10%; Pt now s/p LVAD (9-12)

## 2017-09-14 NOTE — PHYSICAL THERAPY INITIAL EVALUATION ADULT - DISCHARGE DISPOSITION, PT EVAL
home w/ home PT/pending further amb and clearance on stairs/home w/ assist
home w/ outpatient services

## 2017-09-14 NOTE — PROVIDER CONTACT NOTE (CRITICAL VALUE NOTIFICATION) - PERSON GIVING RESULT:
Naun Maharaj
Lynne Le from the lab
Yelena Ramirez
aptt greater than 200
brittani /lab
Alma Delia Euceda/Stat LAb

## 2017-09-14 NOTE — CHART NOTE - NSCHARTNOTEFT_GEN_A_CORE
Source: Patient [X ]    Family [ ]     [X] Medical record, PA,     Pt seen, reports feeling weak and tired. Pt nodding yes and no to questions, not providing detailed answerers. Pt states he is not hungry and dislikes pureed foods and is willing to receive Ensure enlive to supplement PO intake.     Per chart Pt with NICM, new onset acute systolic decompensated HF with EF of 10%, cardiogenic shock, and low output. S/p Heart Mate ll LVAD implant and TV repair on 9/12, POD #2. Per PA, Pt on a pureed diet due to denture related issues. Pt was previously tolerated a regular textured diet. Dicsucssed diet consistency advancement     Diet : Pureed, low Na       Patient reports [X ] nausea    PO intake:  0% PO intake post Op     Source for PO intake [X ] Patient and [X ] chart     Current Weight: 67.8kg, Wt on 9/11: 59.6kg, admission Wt: 66.1kg. prior to OR, Wt was stable ~59kg's. Increase likely related to intraoperative fluids. No documented edema at this time.    % Weight Change    Pertinent Medications: MEDICATIONS  (STANDING):  sodium chloride 0.45%. 1000 milliLiter(s) (10 mL/Hr) IV Continuous <Continuous>  pantoprazole  Injectable 40 milliGRAM(s) IV Push daily  potassium chloride  10 mEq/50 mL IVPB 10 milliEquivalent(s) IV Intermittent every 1 hour  potassium chloride  10 mEq/50 mL IVPB 10 milliEquivalent(s) IV Intermittent every 1 hour  potassium chloride  10 mEq/50 mL IVPB 10 milliEquivalent(s) IV Intermittent once  milrinone Infusion 0.25 MICROgram(s)/kG/Min (4.5 mL/Hr) IV Continuous <Continuous>  fluconAZOLE IVPB 400 milliGRAM(s) IV Intermittent every 24 hours  vancomycin  IVPB 1000 milliGRAM(s) IV Intermittent every 12 hours  ciprofloxacin   IVPB 400 milliGRAM(s) IV Intermittent every 12 hours  aspirin enteric coated 81 milliGRAM(s) Oral daily  insulin lispro (HumaLOG) corrective regimen sliding scale   SubCutaneous three times a day before meals  insulin lispro (HumaLOG) corrective regimen sliding scale   SubCutaneous at bedtime  docusate sodium 100 milliGRAM(s) Oral three times a day  polyethylene glycol 3350 17 Gram(s) Oral daily    MEDICATIONS  (PRN):  oxyCODONE    5 mG/acetaminophen 325 mG 1 Tablet(s) Oral every 4 hours PRN Moderate Pain (4 - 6)  oxyCODONE    5 mG/acetaminophen 325 mG 2 Tablet(s) Oral every 4 hours PRN Severe Pain (7 - 10)    Pertinent Labs:  Hgb/Hct:9.2/26.3-low, Na:130-low, K:5.1, Cl:95-low, BUN:23, Cr:0.76, Glucose:186-high, Ca:9.1, Total Protein:6.5, Albumin:3.8, total Bilirubin:0.5, AlkPhos:57, AST:117-high, ALT:46-high, BG levels: 9/13: 137, 9/14:     Skin: intact     Estimated Needs:   [X ] no change since previous assessment  [ ] recalculated:       Previous Nutrition Diagnosis:    [ X] Increased Nutrient Needs and [ X] Malnutrition       Nutrition Diagnosis is [X ] ongoing        New Nutrition Diagnosis: [X ] not applicable    Recommend    1. Recommend to change diet to Mechanical soft low Na diet. if Pt tolerates, consider advancement to soft vs regular textured diet.   2. Recommend adding Ensure enlive x2 to supplement PO intake  3. Diet education deferred at this time due to Pt in pain; will follow up  4. Encourage adequate PO intake at meal times   5. Provide food preferences as requested within diet restrictions      Monitoring and Evaluation:     [X ] PO intake [ X] Tolerance to diet prescription [ X] weights [ X] follow up per protocol    [ X] other: RD remains available Sarah Siegler RD. Pager #[phone]

## 2017-09-15 DIAGNOSIS — Z95.811 PRESENCE OF HEART ASSIST DEVICE: ICD-10-CM

## 2017-09-15 LAB
ALBUMIN SERPL ELPH-MCNC: 4.4 G/DL — SIGNIFICANT CHANGE UP (ref 3.3–5)
ALP SERPL-CCNC: 64 U/L — SIGNIFICANT CHANGE UP (ref 40–120)
ALT FLD-CCNC: 43 U/L RC — SIGNIFICANT CHANGE UP (ref 10–45)
ANION GAP SERPL CALC-SCNC: 13 MMOL/L — SIGNIFICANT CHANGE UP (ref 5–17)
APPEARANCE UR: CLEAR — SIGNIFICANT CHANGE UP
APTT BLD: 25.4 SEC — LOW (ref 27.5–37.4)
APTT BLD: 35.6 SEC — SIGNIFICANT CHANGE UP (ref 27.5–37.4)
AST SERPL-CCNC: 78 U/L — HIGH (ref 10–40)
BILIRUB SERPL-MCNC: 0.6 MG/DL — SIGNIFICANT CHANGE UP (ref 0.2–1.2)
BILIRUB UR-MCNC: NEGATIVE — SIGNIFICANT CHANGE UP
BUN SERPL-MCNC: 22 MG/DL — SIGNIFICANT CHANGE UP (ref 7–23)
CALCIUM SERPL-MCNC: 9.4 MG/DL — SIGNIFICANT CHANGE UP (ref 8.4–10.5)
CHLORIDE SERPL-SCNC: 91 MMOL/L — LOW (ref 96–108)
CO2 SERPL-SCNC: 25 MMOL/L — SIGNIFICANT CHANGE UP (ref 22–31)
COLOR SPEC: YELLOW — SIGNIFICANT CHANGE UP
CREAT SERPL-MCNC: 0.65 MG/DL — SIGNIFICANT CHANGE UP (ref 0.5–1.3)
DIFF PNL FLD: ABNORMAL
GAS PNL BLDA: SIGNIFICANT CHANGE UP
GLUCOSE SERPL-MCNC: 139 MG/DL — HIGH (ref 70–99)
GLUCOSE UR QL: NEGATIVE — SIGNIFICANT CHANGE UP
HCT VFR BLD CALC: 27.7 % — LOW (ref 39–50)
HGB BLD-MCNC: 9.8 G/DL — LOW (ref 13–17)
INR BLD: 1.17 RATIO — HIGH (ref 0.88–1.16)
KETONES UR-MCNC: NEGATIVE — SIGNIFICANT CHANGE UP
LDH SERPL L TO P-CCNC: 603 U/L — HIGH (ref 50–242)
LEUKOCYTE ESTERASE UR-ACNC: NEGATIVE — SIGNIFICANT CHANGE UP
MCHC RBC-ENTMCNC: 34.2 PG — HIGH (ref 27–34)
MCHC RBC-ENTMCNC: 35.6 GM/DL — SIGNIFICANT CHANGE UP (ref 32–36)
MCV RBC AUTO: 96 FL — SIGNIFICANT CHANGE UP (ref 80–100)
NITRITE UR-MCNC: NEGATIVE — SIGNIFICANT CHANGE UP
PH UR: 6 — SIGNIFICANT CHANGE UP (ref 5–8)
PHOSPHATE SERPL-MCNC: 2.9 MG/DL — SIGNIFICANT CHANGE UP (ref 2.5–4.5)
PLATELET # BLD AUTO: 109 K/UL — LOW (ref 150–400)
POTASSIUM SERPL-MCNC: 4.7 MMOL/L — SIGNIFICANT CHANGE UP (ref 3.5–5.3)
POTASSIUM SERPL-SCNC: 4.7 MMOL/L — SIGNIFICANT CHANGE UP (ref 3.5–5.3)
PROT SERPL-MCNC: 7 G/DL — SIGNIFICANT CHANGE UP (ref 6–8.3)
PROT UR-MCNC: SIGNIFICANT CHANGE UP
PROTHROM AB SERPL-ACNC: 12.8 SEC — HIGH (ref 9.8–12.7)
RBC # BLD: 2.88 M/UL — LOW (ref 4.2–5.8)
RBC # FLD: 13.9 % — SIGNIFICANT CHANGE UP (ref 10.3–14.5)
SODIUM SERPL-SCNC: 129 MMOL/L — LOW (ref 135–145)
SP GR SPEC: 1.02 — SIGNIFICANT CHANGE UP (ref 1.01–1.02)
UROBILINOGEN FLD QL: NEGATIVE — SIGNIFICANT CHANGE UP
WBC # BLD: 16.7 K/UL — HIGH (ref 3.8–10.5)
WBC # FLD AUTO: 16.7 K/UL — HIGH (ref 3.8–10.5)

## 2017-09-15 PROCEDURE — 99291 CRITICAL CARE FIRST HOUR: CPT

## 2017-09-15 PROCEDURE — 99232 SBSQ HOSP IP/OBS MODERATE 35: CPT

## 2017-09-15 PROCEDURE — 71010: CPT | Mod: 26

## 2017-09-15 PROCEDURE — 93750 INTERROGATION VAD IN PERSON: CPT

## 2017-09-15 PROCEDURE — 99292 CRITICAL CARE ADDL 30 MIN: CPT

## 2017-09-15 PROCEDURE — 99233 SBSQ HOSP IP/OBS HIGH 50: CPT

## 2017-09-15 PROCEDURE — 99233 SBSQ HOSP IP/OBS HIGH 50: CPT | Mod: 25

## 2017-09-15 RX ORDER — BUMETANIDE 0.25 MG/ML
1 INJECTION INTRAMUSCULAR; INTRAVENOUS DAILY
Qty: 0 | Refills: 0 | Status: DISCONTINUED | OUTPATIENT
Start: 2017-09-15 | End: 2017-09-17

## 2017-09-15 RX ORDER — HYDROMORPHONE HYDROCHLORIDE 2 MG/ML
0.5 INJECTION INTRAMUSCULAR; INTRAVENOUS; SUBCUTANEOUS ONCE
Qty: 0 | Refills: 0 | Status: DISCONTINUED | OUTPATIENT
Start: 2017-09-15 | End: 2017-09-15

## 2017-09-15 RX ORDER — ACETAMINOPHEN 500 MG
1000 TABLET ORAL ONCE
Qty: 0 | Refills: 0 | Status: COMPLETED | OUTPATIENT
Start: 2017-09-15 | End: 2017-09-15

## 2017-09-15 RX ORDER — WARFARIN SODIUM 2.5 MG/1
5 TABLET ORAL ONCE
Qty: 0 | Refills: 0 | Status: COMPLETED | OUTPATIENT
Start: 2017-09-15 | End: 2017-09-15

## 2017-09-15 RX ORDER — BUMETANIDE 0.25 MG/ML
1 INJECTION INTRAMUSCULAR; INTRAVENOUS EVERY 12 HOURS
Qty: 0 | Refills: 0 | Status: DISCONTINUED | OUTPATIENT
Start: 2017-09-15 | End: 2017-09-15

## 2017-09-15 RX ORDER — FENTANYL CITRATE 50 UG/ML
25 INJECTION INTRAVENOUS ONCE
Qty: 0 | Refills: 0 | Status: DISCONTINUED | OUTPATIENT
Start: 2017-09-15 | End: 2017-09-15

## 2017-09-15 RX ORDER — HEPARIN SODIUM 5000 [USP'U]/ML
500 INJECTION INTRAVENOUS; SUBCUTANEOUS
Qty: 25000 | Refills: 0 | Status: DISCONTINUED | OUTPATIENT
Start: 2017-09-15 | End: 2017-09-16

## 2017-09-15 RX ADMIN — Medication 10 MILLIGRAM(S): at 05:22

## 2017-09-15 RX ADMIN — HEPARIN SODIUM 8 UNIT(S)/HR: 5000 INJECTION INTRAVENOUS; SUBCUTANEOUS at 21:36

## 2017-09-15 RX ADMIN — Medication 10 MILLIGRAM(S): at 13:39

## 2017-09-15 RX ADMIN — OXYCODONE AND ACETAMINOPHEN 2 TABLET(S): 5; 325 TABLET ORAL at 00:08

## 2017-09-15 RX ADMIN — FENTANYL CITRATE 25 MICROGRAM(S): 50 INJECTION INTRAVENOUS at 10:07

## 2017-09-15 RX ADMIN — Medication 10 MILLIGRAM(S): at 22:23

## 2017-09-15 RX ADMIN — Medication 250 MILLIGRAM(S): at 21:35

## 2017-09-15 RX ADMIN — Medication 400 MILLIGRAM(S): at 09:37

## 2017-09-15 RX ADMIN — Medication 1: at 13:20

## 2017-09-15 RX ADMIN — HEPARIN SODIUM 5 UNIT(S)/HR: 5000 INJECTION INTRAVENOUS; SUBCUTANEOUS at 06:48

## 2017-09-15 RX ADMIN — BUMETANIDE 1 MILLIGRAM(S): 0.25 INJECTION INTRAMUSCULAR; INTRAVENOUS at 13:19

## 2017-09-15 RX ADMIN — OXYCODONE AND ACETAMINOPHEN 2 TABLET(S): 5; 325 TABLET ORAL at 13:19

## 2017-09-15 RX ADMIN — HYDROMORPHONE HYDROCHLORIDE 0.5 MILLIGRAM(S): 2 INJECTION INTRAMUSCULAR; INTRAVENOUS; SUBCUTANEOUS at 21:00

## 2017-09-15 RX ADMIN — HYDROMORPHONE HYDROCHLORIDE 0.5 MILLIGRAM(S): 2 INJECTION INTRAMUSCULAR; INTRAVENOUS; SUBCUTANEOUS at 21:15

## 2017-09-15 RX ADMIN — HEPARIN SODIUM 7 UNIT(S)/HR: 5000 INJECTION INTRAVENOUS; SUBCUTANEOUS at 09:38

## 2017-09-15 RX ADMIN — HEPARIN SODIUM 5 UNIT(S)/HR: 5000 INJECTION INTRAVENOUS; SUBCUTANEOUS at 08:08

## 2017-09-15 RX ADMIN — Medication 100 MILLIGRAM(S): at 22:23

## 2017-09-15 RX ADMIN — OXYCODONE AND ACETAMINOPHEN 2 TABLET(S): 5; 325 TABLET ORAL at 17:42

## 2017-09-15 RX ADMIN — FENTANYL CITRATE 25 MICROGRAM(S): 50 INJECTION INTRAVENOUS at 09:44

## 2017-09-15 RX ADMIN — Medication 1000 MILLIGRAM(S): at 10:07

## 2017-09-15 RX ADMIN — HYDROMORPHONE HYDROCHLORIDE 0.5 MILLIGRAM(S): 2 INJECTION INTRAMUSCULAR; INTRAVENOUS; SUBCUTANEOUS at 00:42

## 2017-09-15 RX ADMIN — PANTOPRAZOLE SODIUM 40 MILLIGRAM(S): 20 TABLET, DELAYED RELEASE ORAL at 13:19

## 2017-09-15 RX ADMIN — FLUCONAZOLE 100 MILLIGRAM(S): 150 TABLET ORAL at 09:44

## 2017-09-15 RX ADMIN — Medication 200 MILLIGRAM(S): at 09:37

## 2017-09-15 RX ADMIN — OXYCODONE AND ACETAMINOPHEN 2 TABLET(S): 5; 325 TABLET ORAL at 14:00

## 2017-09-15 RX ADMIN — WARFARIN SODIUM 5 MILLIGRAM(S): 2.5 TABLET ORAL at 22:23

## 2017-09-15 RX ADMIN — Medication 100 MILLIGRAM(S): at 05:22

## 2017-09-15 RX ADMIN — POLYETHYLENE GLYCOL 3350 17 GRAM(S): 17 POWDER, FOR SOLUTION ORAL at 13:19

## 2017-09-15 RX ADMIN — WARFARIN SODIUM 5 MILLIGRAM(S): 2.5 TABLET ORAL at 08:33

## 2017-09-15 RX ADMIN — Medication 100 MILLIGRAM(S): at 13:19

## 2017-09-15 RX ADMIN — Medication 200 MILLIGRAM(S): at 21:25

## 2017-09-15 RX ADMIN — OXYCODONE AND ACETAMINOPHEN 2 TABLET(S): 5; 325 TABLET ORAL at 22:45

## 2017-09-15 RX ADMIN — HEPARIN SODIUM 8 UNIT(S)/HR: 5000 INJECTION INTRAVENOUS; SUBCUTANEOUS at 19:08

## 2017-09-15 RX ADMIN — Medication 250 MILLIGRAM(S): at 08:01

## 2017-09-15 RX ADMIN — HYDROMORPHONE HYDROCHLORIDE 0.5 MILLIGRAM(S): 2 INJECTION INTRAMUSCULAR; INTRAVENOUS; SUBCUTANEOUS at 00:12

## 2017-09-15 RX ADMIN — Medication 81 MILLIGRAM(S): at 13:19

## 2017-09-15 RX ADMIN — MILRINONE LACTATE 4.5 MICROGRAM(S)/KG/MIN: 1 INJECTION, SOLUTION INTRAVENOUS at 08:08

## 2017-09-15 RX ADMIN — MILRINONE LACTATE 4.5 MICROGRAM(S)/KG/MIN: 1 INJECTION, SOLUTION INTRAVENOUS at 06:48

## 2017-09-15 RX ADMIN — MILRINONE LACTATE 4.5 MICROGRAM(S)/KG/MIN: 1 INJECTION, SOLUTION INTRAVENOUS at 21:35

## 2017-09-15 RX ADMIN — OXYCODONE AND ACETAMINOPHEN 2 TABLET(S): 5; 325 TABLET ORAL at 18:10

## 2017-09-15 RX ADMIN — OXYCODONE AND ACETAMINOPHEN 2 TABLET(S): 5; 325 TABLET ORAL at 22:15

## 2017-09-15 NOTE — PROGRESS NOTE ADULT - ASSESSMENT
62M with Class IV Heart Failure a/w abdominal pain 2/2 acute decompensated heart failure, now s/p LVAD.

## 2017-09-15 NOTE — PROGRESS NOTE ADULT - ASSESSMENT
62 y.o. M s/p LVAD implantation, TV repair (ring). Is currently on Primacor gtt. At this time not anticoagulated as per attending/HF team for increased bleeding from chest tubes requiring Amicar. Chest tube output has since slowed down. CVP elevated and patient responded to 1mg Bumex x2, making good urine output and -2L overall for 24h.   Patient care discussed on morning interdisciplinary rounds with CTS team.

## 2017-09-15 NOTE — PROGRESS NOTE ADULT - ASSESSMENT
Briefly, 63 y/o Creole speaking male who presented with abdominal pain and was found to be in low output heart failure with cardiogenic shock (EF 10%) underwent LVAD/TV annuloplasty for INTERMACS 2 9/12; postop had bleeding requiring PRBC, amicar, PLT, cryo. Inotropes titrated down. TTE done which showed LVEDD 5.9 cm, mild-mod MR, aortic valve closed, mild RV dysfunction, mild TR. Extubated. Doing well. Remains on milrinone 0.25. Had some further bleeding while on anticoagulation from left chest tube which slowed down with Amicar. CVP improved. CXR with L pleural effusion (? blood).   - BP improved on hydral 10 mg q8h; goal MAP 70-80  - goal CVP 6-8; s/p bumex 1 mg; continue 1 mg daily  - OOB to chair when possible  - venous sat adequate; can reduce milrinone to 0.125 and follow serial sats  - monitor H/H closely for bleeding; anticoagulation per CT surgery  - LDH fairly stable; would attempt to start anticoagulation if possible

## 2017-09-15 NOTE — PROGRESS NOTE ADULT - PROBLEM SELECTOR PLAN 1
S/p LVAD. C/w Primacor, wean as tolerated.  Monitor CVP, I's &O's, urine output now s/p Bumex 1mg x2   Prophylaxis: DVT-venodynes, will address addition of A/C with attending/HF team.   GI-Protonix  Pain management  Glucose control  D/C CT when output minimal - none, monitor H/H   Resume appropriate home medications  OOB to chair, ambulate as tolerated  IS/pulm toilet

## 2017-09-15 NOTE — PROGRESS NOTE ADULT - ASSESSMENT
61 yo man Armenian primarily Armenian s[peaking who is s/p LVAD placement day # 3 and doing well.    Would complete the standard antibiotic prophylaxis and then observe.

## 2017-09-15 NOTE — PROGRESS NOTE ADULT - PROBLEM SELECTOR PLAN 1
patient is off bronchodilators at this time. He has not been wheezing  but does have underlying COPD: Suggest to add spiriva, if it is ok with primary team. Keep his o2 sao2 above 88%. Chest radiograph reviewed, and I think it is atelectasis rather then pneumonia at left base: Pt has no leucocytosis and T Max of 98.8. Patient is on multiple broad spectrum antibiotics!!

## 2017-09-15 NOTE — PROGRESS NOTE ADULT - SUBJECTIVE AND OBJECTIVE BOX
CRITICAL CARE ATTENDING - CTICU    MEDICATIONS  (STANDING):  sodium chloride 0.45%. 1000 milliLiter(s) (10 mL/Hr) IV Continuous <Continuous>  pantoprazole  Injectable 40 milliGRAM(s) IV Push daily  milrinone Infusion 0.25 MICROgram(s)/kG/Min (4.5 mL/Hr) IV Continuous <Continuous>  vancomycin  IVPB 1000 milliGRAM(s) IV Intermittent every 12 hours  ciprofloxacin   IVPB 400 milliGRAM(s) IV Intermittent every 12 hours  aspirin enteric coated 81 milliGRAM(s) Oral daily  insulin lispro (HumaLOG) corrective regimen sliding scale   SubCutaneous three times a day before meals  insulin lispro (HumaLOG) corrective regimen sliding scale   SubCutaneous at bedtime  docusate sodium 100 milliGRAM(s) Oral three times a day  polyethylene glycol 3350 17 Gram(s) Oral daily  hydrALAZINE 10 milliGRAM(s) Oral every 8 hours  heparin  Infusion 500 Unit(s)/Hr (7 mL/Hr) IV Continuous <Continuous>  warfarin 5 milliGRAM(s) Oral once                                    9.8    16.7  )-----------( 109      ( 15 Sep 2017 01:41 )             27.7       09-15    129<L>  |  91<L>  |  22  ----------------------------<  139<H>  4.7   |  25  |  0.65    Ca    9.4      15 Sep 2017 01:41  Phos  2.9     09-15    TPro  7.0  /  Alb  4.4  /  TBili  0.6  /  DBili  x   /  AST  78<H>  /  ALT  43  /  AlkPhos  64  15      PT/INR - ( 15 Sep 2017 01:41 )   PT: 12.8 sec;   INR: 1.17 ratio         PTT - ( 15 Sep 2017 08:20 )  PTT:29.5 sec        Daily     Daily Weight in k (15 Sep 2017 06:00)      -14 @ 07:01  -  09-15 @ 07:00  --------------------------------------------------------  IN: 1483 mL / OUT: 4775 mL / NET: -3292 mL    09-15 @ 07:01  -  09-15 @ 11:23  --------------------------------------------------------  IN: 932 mL / OUT: 195 mL / NET: 737 mL        Critically Ill patient  : [ ] preoperative ,   [x ] post operative    Requires :  [x ] Arterial Line   [x ] Central Line  [ ] PA catheter  [ ] IABP  [ ] ECMO  [x ] LVAD  [ ] Ventilator  [x ] pacemaker [ ] Impella.    Bedside evaluation , monitoring , treatment of hemodynamics , fluids , IVP/ IVCD meds.        Diagnosis:     POD 3 LVAD / TV Ring    CHF- acute [x ]   chronic [x ]    systolic [x   diatolic [ ]          - Echo- EF - 10%            [ ] RV dysfunction          - Cxr-cardiomegally, edema          - Clinical-  [x ]inotropes   [x ]pressors   [x ]diuresis   [ ]IABP   [ ]ECMO   [x ]LVAD   [ ]Respiratory Failure    Hemodynamic lability,instability. Requires IVCD [x ] vasopressors [x ] inotropes  [ ] vasodilator  [ ]IVSS fluid  to maintain MAP, perfusion, C.I.     IVCD anticoagulation with [x ] Heparin  for LVAD circuit    Requires bedside physical therapy, mobilization and total skilled nursing care.     Hypotension a/w Hypertension, requiring intravenous medication.     fluid overload                             -                     Discussed with CT surgeon, Physician's Assistant - Nurse Practitioner- Critical care medicine team.   Dicussed at  AM / PM rounds.   Chart, labs , films reviewed.    Total Time: 120 min.

## 2017-09-15 NOTE — PROGRESS NOTE ADULT - SUBJECTIVE AND OBJECTIVE BOX
Operation: POD #3 LVAD implant, TV repair (ring)   Narrative: 62 year old M, resting comfortably in bed, no acute distress. Extubated     Vital Signs Last 24 Hrs  T(C): 37.1 (14 Sep 2017 15:00), Max: 37.5 (14 Sep 2017 07:30)  T(F): 98.8 (14 Sep 2017 15:00), Max: 99.5 (14 Sep 2017 07:30)  HR: 95 (15 Sep 2017 01:00) (89 - 101)  BP: --  BP(mean): --  RR: 27 (15 Sep 2017 01:00) (11 - 33)  SpO2: 100% (15 Sep 2017 01:00) (79% - 100%)      09-13 @ 07:01  -  09-14 @ 07:00  --------------------------------------------------------  IN:    DOBUTamine Infusion: 18 mL    heparin Infusion: 70 mL    milrinone  Infusion: 112.4 mL    Oral Fluid: 240 mL    sodium chloride 0.45%.: 250 mL    Solution: 850 mL  Total IN: 1540.4 mL    OUT:    Chest Tube: 80 mL    Chest Tube: 170 mL    Chest Tube: 70 mL    Chest Tube: 620 mL    Indwelling Catheter - Urethral: 2200 mL  Total OUT: 3140 mL    Total NET: -1599.6 mL      09-14 @ 07:01 - 09-15 @ 01:29  --------------------------------------------------------  IN:    milrinone  Infusion: 81 mL    Oral Fluid: 220 mL    Packed Red Blood Cells: 320 mL    sodium chloride 0.45%.: 110 mL    Solution: 650 mL  Total IN: 1381 mL    OUT:    Chest Tube: 50 mL    Chest Tube: 50 mL    Chest Tube: 720 mL    Chest Tube: 20 mL    Indwelling Catheter - Urethral: 2590 mL  Total OUT: 3430 mL    Total NET: -2049 mL    LABS:    MEDICATIONS  (STANDING):  sodium chloride 0.45%. 1000 milliLiter(s) (10 mL/Hr) IV Continuous <Continuous>  pantoprazole  Injectable 40 milliGRAM(s) IV Push daily  milrinone Infusion 0.25 MICROgram(s)/kG/Min (4.5 mL/Hr) IV Continuous <Continuous>  fluconAZOLE IVPB 400 milliGRAM(s) IV Intermittent every 24 hours  vancomycin  IVPB 1000 milliGRAM(s) IV Intermittent every 12 hours  ciprofloxacin   IVPB 400 milliGRAM(s) IV Intermittent every 12 hours  aspirin enteric coated 81 milliGRAM(s) Oral daily  insulin lispro (HumaLOG) corrective regimen sliding scale   SubCutaneous three times a day before meals  insulin lispro (HumaLOG) corrective regimen sliding scale   SubCutaneous at bedtime  docusate sodium 100 milliGRAM(s) Oral three times a day  polyethylene glycol 3350 17 Gram(s) Oral daily  hydrALAZINE 10 milliGRAM(s) Oral every 8 hours    MEDICATIONS  (PRN):  oxyCODONE    5 mG/acetaminophen 325 mG 1 Tablet(s) Oral every 4 hours PRN Moderate Pain (4 - 6)  oxyCODONE    5 mG/acetaminophen 325 mG 2 Tablet(s) Oral every 4 hours PRN Severe Pain (7 - 10)    PHYSICAL EXAM:  General: A+Ox3, in NAD, follows commands, no focal deficits noted  Cardiovascular: S1, S2, RRR. Tele: NSR   Lungs: Clear to auscultation without wheezes, rhonchi, rales   Abdomen: Soft, Non-distended, non-tender,  + BS  Extremities: Warm, well perfused, palpable/dopplerable distal pulses, no edema    Incision: Sternal dressing clean, dry, intact. Driveline site C/D/I.   Lines: R IJ intro, R radial [place], PIV  Drains: Landrum catheter. Mediastinal and pleural chest tubes with sero-sang drainage, to low wall suction, no air leak.     RADIOLOGY & ADDITIONAL TESTS:    ADDITIONAL DATA:   Does the patient have a history of CHF? Yes  -If yes, systolic or diastolic: biventricular dysfunction  -Pre-operative EF: 10-15%    Extubation within 24 hours? Yes    Does the patient have a history of kidney disease? No  -Give CKD stage if applicable:  -Patient's baseline Creatinine: 1.14    Did the patient receive transfusion of blood and/or products? Yes, platelets x2   -If yes, indication: Intraop acute blood loss    Wilma-operative antibiotics discontinued within 48 hours of CTU admission?   -Name/date/time of discontinue: Diflucan 9/15/17 09:30; Cipro 9/15/17 09:30; Vanco 9/15/17 21:30 Operation: POD #3 LVAD implant, TV repair (ring)   Narrative: 62 year old M, resting comfortably in bed, no acute distress. Extubated     Vital Signs Last 24 Hrs  T(C): 37.1 (14 Sep 2017 15:00), Max: 37.5 (14 Sep 2017 07:30)  T(F): 98.8 (14 Sep 2017 15:00), Max: 99.5 (14 Sep 2017 07:30)  HR: 95 (15 Sep 2017 01:00) (89 - 101)  BP: --  BP(mean): --  RR: 27 (15 Sep 2017 01:00) (11 - 33)  SpO2: 100% (15 Sep 2017 01:00) (79% - 100%)      09-13 @ 07:01  -  09-14 @ 07:00  --------------------------------------------------------  IN:    DOBUTamine Infusion: 18 mL    heparin Infusion: 70 mL    milrinone  Infusion: 112.4 mL    Oral Fluid: 240 mL    sodium chloride 0.45%.: 250 mL    Solution: 850 mL  Total IN: 1540.4 mL    OUT:    Chest Tube: 80 mL    Chest Tube: 170 mL    Chest Tube: 70 mL    Chest Tube: 620 mL    Indwelling Catheter - Urethral: 2200 mL  Total OUT: 3140 mL    Total NET: -1599.6 mL      09-14 @ 07:01  -  09-15 @ 01:29  --------------------------------------------------------  IN:    milrinone  Infusion: 81 mL    Oral Fluid: 220 mL    Packed Red Blood Cells: 320 mL    sodium chloride 0.45%.: 110 mL    Solution: 650 mL  Total IN: 1381 mL    OUT:    Chest Tube: 50 mL    Chest Tube: 50 mL    Chest Tube: 720 mL    Chest Tube: 20 mL    Indwelling Catheter - Urethral: 2590 mL  Total OUT: 3430 mL    Total NET: -2049 mL    MEDICATIONS  (STANDING):  sodium chloride 0.45%. 1000 milliLiter(s) (10 mL/Hr) IV Continuous <Continuous>  pantoprazole  Injectable 40 milliGRAM(s) IV Push daily  milrinone Infusion 0.25 MICROgram(s)/kG/Min (4.5 mL/Hr) IV Continuous <Continuous>  fluconAZOLE IVPB 400 milliGRAM(s) IV Intermittent every 24 hours  vancomycin  IVPB 1000 milliGRAM(s) IV Intermittent every 12 hours  ciprofloxacin   IVPB 400 milliGRAM(s) IV Intermittent every 12 hours  aspirin enteric coated 81 milliGRAM(s) Oral daily  insulin lispro (HumaLOG) corrective regimen sliding scale   SubCutaneous three times a day before meals  insulin lispro (HumaLOG) corrective regimen sliding scale   SubCutaneous at bedtime  docusate sodium 100 milliGRAM(s) Oral three times a day  polyethylene glycol 3350 17 Gram(s) Oral daily  hydrALAZINE 10 milliGRAM(s) Oral every 8 hours    MEDICATIONS  (PRN):  oxyCODONE    5 mG/acetaminophen 325 mG 1 Tablet(s) Oral every 4 hours PRN Moderate Pain (4 - 6)  oxyCODONE    5 mG/acetaminophen 325 mG 2 Tablet(s) Oral every 4 hours PRN Severe Pain (7 - 10)    PHYSICAL EXAM:  General: A+Ox3, in NAD, follows commands, no focal deficits noted  Cardiovascular: S1, S2, RRR. Tele: NSR   Lungs: Clear to auscultation without wheezes, rhonchi, rales   Abdomen: Soft, Non-distended, non-tender,  + BS  Extremities: Warm, well perfused, palpable/dopplerable distal pulses, no edema    Incision: Sternal dressing clean, dry, intact. Driveline site C/D/I.   Lines: R IJ intro, R radial [place], PIV  Drains: Landrum catheter. Mediastinal and pleural chest tubes with sero-sang drainage, to low wall suction, no air leak.     RADIOLOGY & ADDITIONAL TESTS: < from: Xray Chest 1 View AP- PORTABLE-Urgent (09.14.17 @ 09:49) >  EXAM:  CHEST-PORTABLE URGENT                            PROCEDURE DATE:  09/14/2017      < end of copied text >  ADDITIONAL DATA:   Does the patient have a history of CHF? Yes  < from: Xray Chest 1 View AP- PORTABLE-Urgent (09.14.17 @ 09:49) >  Impression:    The heart is enlarged. Left lower lobe pneumonia and/or atelectasis. The   right lung appears to be clear. A left chest tubes in place. No   pneumothorax. NG tube is in the stomach. A Cordis sheet catheter is seen   on the right and the tip is in superior vena cava. Mediastinal tube is in   place. A left ventricular assisted device is in place. Status post   sternotomy.    < end of copied text >  -If yes, systolic or diastolic: biventricular dysfunction  -Pre-operative EF: 10-15%    Extubation within 24 hours? Yes    Does the patient have a history of kidney disease? No  -Give CKD stage if applicable:  -Patient's baseline Creatinine: 1.14    Did the patient receive transfusion of blood and/or products? Yes, platelets x2   -If yes, indication: Intraop acute blood loss    Wilma-operative antibiotics discontinued within 48 hours of CTU admission?   -Name/date/time of discontinue: Diflucan 9/15/17 09:30; Cipro 9/15/17 09:30; Vanco 9/15/17 21:30

## 2017-09-15 NOTE — PROGRESS NOTE ADULT - SUBJECTIVE AND OBJECTIVE BOX
Interval History:  doing well. no BM in 3 days. diuresed well to 1 mg bumex. Venous sat excellent on current support. Chest tube output reduced    Medications:  sodium chloride 0.45%. 1000 milliLiter(s) IV Continuous <Continuous>  pantoprazole  Injectable 40 milliGRAM(s) IV Push daily  milrinone Infusion 0.25 MICROgram(s)/kG/Min IV Continuous <Continuous>  aspirin enteric coated 81 milliGRAM(s) Oral daily  insulin lispro (HumaLOG) corrective regimen sliding scale   SubCutaneous three times a day before meals  insulin lispro (HumaLOG) corrective regimen sliding scale   SubCutaneous at bedtime  oxyCODONE    5 mG/acetaminophen 325 mG 1 Tablet(s) Oral every 4 hours PRN  oxyCODONE    5 mG/acetaminophen 325 mG 2 Tablet(s) Oral every 4 hours PRN  docusate sodium 100 milliGRAM(s) Oral three times a day  polyethylene glycol 3350 17 Gram(s) Oral daily  hydrALAZINE 10 milliGRAM(s) Oral every 8 hours  heparin  Infusion 500 Unit(s)/Hr IV Continuous <Continuous>  warfarin 5 milliGRAM(s) Oral once  buMETAnide Injectable 1 milliGRAM(s) IV Push daily      Vitals:  T(C): 36.5 (09-15-17 @ 17:00), Max: 36.9 (09-15-17 @ 07:00)  HR: 99 (09-15-17 @ 21:00) (89 - 101)  BP: --  BP(mean): --  ABP: 106/85 (09-15-17 @ 21:00) (79/63 - 106/87)  ABP(mean): 95 (09-15-17 @ 21:00) (71 - 95)  RR: 17 (09-15-17 @ 21:00) (11 - 32)  SpO2: 100% (09-15-17 @ 21:00) (93% - 100%)    Daily     Daily Weight in k (15 Sep 2017 06:00)    I&O's Summary    14 Sep 2017 07:01  -  15 Sep 2017 07:00  --------------------------------------------------------  IN: 1483 mL / OUT: 4775 mL / NET: -3292 mL    15 Sep 2017 07:01  -  15 Sep 2017 21:49  --------------------------------------------------------  IN: 1501 mL / OUT: 2045 mL / NET: -544 mL        Physical Exam:  Appearance: No Acute Distress  HEENT: elevated JVD  Cardiovascular: Normal S1 S2, No murmurs/rubs/gallops. LVAD hum  Respiratory: dec BS on L  Gastrointestinal: Soft, Non-tender	  Skin: No cyanosis	  Neurologic: Non-focal  Extremities: No LE edema  Psychiatry: A & O x 3, Mood & affect appropriate    Labs:                        9.8    16.7  )-----------( 109      ( 15 Sep 2017 01:41 )             27.7     09-15    129<L>  |  91<L>  |  22  ----------------------------<  139<H>  4.7   |  25  |  0.65    Ca    9.4      15 Sep 2017 01:41  Phos  2.9     09-15    TPro  7.0  /  Alb  4.4  /  TBili  0.6  /  DBili  x   /  AST  78<H>  /  ALT  43  /  AlkPhos  64  09-15    PT/INR - ( 15 Sep 2017 17:13 )   PT: 12.1 sec;   INR: 1.12 ratio         PTT - ( 15 Sep 2017 17:13 )  PTT:33.3 sec    Lactate Dehydrogenase, Serum: 603 U/L (09-15 @ 01:41)  Lactate Dehydrogenase, SePI 6.6  rum: 535 U/L ( @ 01:06)  Lactate Dehydrogenase, Serum: 368 U/L ( @ 01:47)    LVAD interrogation  Speed 9000  no power spikes

## 2017-09-15 NOTE — PROGRESS NOTE ADULT - SUBJECTIVE AND OBJECTIVE BOX
INFECTIOUS DISEASES FOLLOW UP-- Anitra Prater  [phone]    This is a follow up note for this  62yMale with  Other pulmonary embolism without acute cor pulmonale with severe heart failure s/p LVAD placement and TV post op day # 3. He is doing well, afebrile sitting up in a chair        ROS:  CONSTITUTIONAL:  No fever, good appetite  CARDIOVASCULAR:  No chest pain beyond incisional or palpitations  RESPIRATORY:  No dyspnea  GASTROINTESTINAL:  No nausea, vomiting, diarrhea, or abdominal pain  GENITOURINARY:  No dysuria  NEUROLOGIC:  No headache,     Allergies    No Known Allergies    Intolerances        ANTIBIOTICS/RELEVANT:  antimicrobials  vancomycin  IVPB 1000 milliGRAM(s) IV Intermittent every 12 hours  ciprofloxacin   IVPB 400 milliGRAM(s) IV Intermittent every 12 hours    immunologic:    OTHER:  sodium chloride 0.45%. 1000 milliLiter(s) IV Continuous <Continuous>  pantoprazole  Injectable 40 milliGRAM(s) IV Push daily  milrinone Infusion 0.25 MICROgram(s)/kG/Min IV Continuous <Continuous>  aspirin enteric coated 81 milliGRAM(s) Oral daily  insulin lispro (HumaLOG) corrective regimen sliding scale   SubCutaneous three times a day before meals  insulin lispro (HumaLOG) corrective regimen sliding scale   SubCutaneous at bedtime  oxyCODONE    5 mG/acetaminophen 325 mG 1 Tablet(s) Oral every 4 hours PRN  oxyCODONE    5 mG/acetaminophen 325 mG 2 Tablet(s) Oral every 4 hours PRN  docusate sodium 100 milliGRAM(s) Oral three times a day  polyethylene glycol 3350 17 Gram(s) Oral daily  hydrALAZINE 10 milliGRAM(s) Oral every 8 hours  heparin  Infusion 500 Unit(s)/Hr IV Continuous <Continuous>  warfarin 5 milliGRAM(s) Oral once  buMETAnide Injectable 1 milliGRAM(s) IV Push every 12 hours      Objective:  Vital Signs Last 24 Hrs  T(C): 36.4 (15 Sep 2017 15:00), Max: 36.9 (15 Sep 2017 07:00)  T(F): 97.5 (15 Sep 2017 15:00), Max: 98.4 (15 Sep 2017 07:00)  HR: 97 (15 Sep 2017 15:00) (89 - 101)  BP: --  BP(mean): --  RR: 32 (15 Sep 2017 15:00) (11 - 33)  SpO2: 99% (15 Sep 2017 15:00) (79% - 100%)    PHYSICAL EXAM:  Constitutional:no acute distress  Eyes:ALONSO, EOMI  Ear/Nose/Throat: no oral lesions, 	  Respiratory: clear BL  Cardiovascular: S1S2  Gastrointestinal:soft, (+) BS, no tenderness  Extremities:no e/e/c  No Lymphadenopathy  IV sites not inflammed.    LABS:                        9.8    16.7  )-----------( 109      ( 15 Sep 2017 01:41 )             27.7     09-15    129<L>  |  91<L>  |  22  ----------------------------<  139<H>  4.7   |  25  |  0.65    Ca    9.4      15 Sep 2017 01:41  Phos  2.9     09-15    TPro  7.0  /  Alb  4.4  /  TBili  0.6  /  DBili  x   /  AST  78<H>  /  ALT  43  /  AlkPhos  64  09-15    PT/INR - ( 15 Sep 2017 01:41 )   PT: 12.8 sec;   INR: 1.17 ratio         PTT - ( 15 Sep 2017 08:20 )  PTT:29.5 sec  Urinalysis Basic - ( 15 Sep 2017 11:47 )    Color: Yellow / Appearance: Clear / S.020 / pH: x  Gluc: x / Ketone: Negative  / Bili: Negative / Urobili: Negative   Blood: x / Protein: Trace / Nitrite: Negative   Leuk Esterase: Negative / RBC: >50 /HPF / WBC 5-10 /HPF   Sq Epi: x / Non Sq Epi: x / Bacteria: x        MICROBIOLOGY:    Urine Microscopic-Add On (NC) (09.15.17 @ 11:47)    White Blood Cell - Urine: 5-10 /HPF    Red Blood Cell - Urine: >50 /HPF            RECENT CULTURES:      RADIOLOGY & ADDITIONAL STUDIES: INFECTIOUS DISEASES FOLLOW UP-- Anitra Prater  [phone]    This is a follow up note for this  62yMale with  Other pulmonary embolism without acute cor pulmonale with severe heart failure s/p LVAD placement and TV post op day # 3. He is doing well, afebrile sitting up in a chair        ROS:  CONSTITUTIONAL:  No fever, good appetite  CARDIOVASCULAR:  No chest pain beyond incisional or palpitations  RESPIRATORY:  No dyspnea  GASTROINTESTINAL:  No nausea, vomiting, diarrhea, or abdominal pain  GENITOURINARY:  No dysuria  NEUROLOGIC:  No headache,     Allergies    No Known Allergies    Intolerances        ANTIBIOTICS/RELEVANT:  antimicrobials  vancomycin  IVPB 1000 milliGRAM(s) IV Intermittent every 12 hours  ciprofloxacin   IVPB 400 milliGRAM(s) IV Intermittent every 12 hours    immunologic:    OTHER:  sodium chloride 0.45%. 1000 milliLiter(s) IV Continuous <Continuous>  pantoprazole  Injectable 40 milliGRAM(s) IV Push daily  milrinone Infusion 0.25 MICROgram(s)/kG/Min IV Continuous <Continuous>  aspirin enteric coated 81 milliGRAM(s) Oral daily  insulin lispro (HumaLOG) corrective regimen sliding scale   SubCutaneous three times a day before meals  insulin lispro (HumaLOG) corrective regimen sliding scale   SubCutaneous at bedtime  oxyCODONE    5 mG/acetaminophen 325 mG 1 Tablet(s) Oral every 4 hours PRN  oxyCODONE    5 mG/acetaminophen 325 mG 2 Tablet(s) Oral every 4 hours PRN  docusate sodium 100 milliGRAM(s) Oral three times a day  polyethylene glycol 3350 17 Gram(s) Oral daily  hydrALAZINE 10 milliGRAM(s) Oral every 8 hours  heparin  Infusion 500 Unit(s)/Hr IV Continuous <Continuous>  warfarin 5 milliGRAM(s) Oral once  buMETAnide Injectable 1 milliGRAM(s) IV Push every 12 hours      Objective:  Vital Signs Last 24 Hrs  T(C): 36.4 (15 Sep 2017 15:00), Max: 36.9 (15 Sep 2017 07:00)  T(F): 97.5 (15 Sep 2017 15:00), Max: 98.4 (15 Sep 2017 07:00)  HR: 97 (15 Sep 2017 15:00) (89 - 101)  BP: --  BP(mean): --  RR: 32 (15 Sep 2017 15:00) (11 - 33)  SpO2: 99% (15 Sep 2017 15:00) (79% - 100%)    PHYSICAL EXAM:  Constitutional:no acute distress  Eyes:ALONSO, EOMI  Ear/Nose/Throat: no oral lesions, 	  Respiratory: clear BL anteriorly with median sternotomy dressing dry and intact  CT tube entrance sites with significant drainage  driveline site without drainage  Cardiovascular: LVAD sounds  Gastrointestinal:soft, (+) BS, no tenderness  Extremities:no e/e/c  No Lymphadenopathy  IV sites not inflammed.    LABS:                        9.8    16.7  )-----------( 109      ( 15 Sep 2017 01:41 )             27.7     09-15    129<L>  |  91<L>  |  22  ----------------------------<  139<H>  4.7   |  25  |  0.65    Ca    9.4      15 Sep 2017 01:41  Phos  2.9     09-15    TPro  7.0  /  Alb  4.4  /  TBili  0.6  /  DBili  x   /  AST  78<H>  /  ALT  43  /  AlkPhos  64  15    PT/INR - ( 15 Sep 2017 01:41 )   PT: 12.8 sec;   INR: 1.17 ratio         PTT - ( 15 Sep 2017 08:20 )  PTT:29.5 sec  Urinalysis Basic - ( 15 Sep 2017 11:47 )    Color: Yellow / Appearance: Clear / S.020 / pH: x  Gluc: x / Ketone: Negative  / Bili: Negative / Urobili: Negative   Blood: x / Protein: Trace / Nitrite: Negative   Leuk Esterase: Negative / RBC: >50 /HPF / WBC 5-10 /HPF   Sq Epi: x / Non Sq Epi: x / Bacteria: x        MICROBIOLOGY:    Urine Microscopic-Add On (NC) (09.15.17 @ 11:47)    White Blood Cell - Urine: 5-10 /HPF    Red Blood Cell - Urine: >50 /HPF            RECENT CULTURES:      RADIOLOGY & ADDITIONAL STUDIES:

## 2017-09-15 NOTE — PROGRESS NOTE ADULT - PROBLEM SELECTOR PLAN 4
Spoke with pt at bedside.  Eating lunch independently.  Patient feels well. Ongoing care as per CHF/ CTICU team. Please reconsult as needed.

## 2017-09-15 NOTE — PROGRESS NOTE ADULT - SUBJECTIVE AND OBJECTIVE BOX
Patient is a 62y old  Male who presents with a chief complaint of SOOB (25 Aug 2017 22:27)  back on heparin    OOB ---->Chair today  feels realtively better today   no SOB   no wheezing       Any change in ROS:     MEDICATIONS  (STANDING):  sodium chloride 0.45%. 1000 milliLiter(s) (10 mL/Hr) IV Continuous <Continuous>  pantoprazole  Injectable 40 milliGRAM(s) IV Push daily  milrinone Infusion 0.25 MICROgram(s)/kG/Min (4.5 mL/Hr) IV Continuous <Continuous>  vancomycin  IVPB 1000 milliGRAM(s) IV Intermittent every 12 hours  ciprofloxacin   IVPB 400 milliGRAM(s) IV Intermittent every 12 hours  aspirin enteric coated 81 milliGRAM(s) Oral daily  insulin lispro (HumaLOG) corrective regimen sliding scale   SubCutaneous three times a day before meals  insulin lispro (HumaLOG) corrective regimen sliding scale   SubCutaneous at bedtime  docusate sodium 100 milliGRAM(s) Oral three times a day  polyethylene glycol 3350 17 Gram(s) Oral daily  hydrALAZINE 10 milliGRAM(s) Oral every 8 hours  heparin  Infusion 500 Unit(s)/Hr (7 mL/Hr) IV Continuous <Continuous>  warfarin 5 milliGRAM(s) Oral once    MEDICATIONS  (PRN):  oxyCODONE    5 mG/acetaminophen 325 mG 1 Tablet(s) Oral every 4 hours PRN Moderate Pain (4 - 6)  oxyCODONE    5 mG/acetaminophen 325 mG 2 Tablet(s) Oral every 4 hours PRN Severe Pain (7 - 10)    Vital Signs Last 24 Hrs  T(C): 36.9 (15 Sep 2017 08:00), Max: 37.1 (14 Sep 2017 11:00)  T(F): 98.4 (15 Sep 2017 08:00), Max: 98.8 (14 Sep 2017 11:00)  HR: 98 (15 Sep 2017 09:00) (89 - 101)  BP: --  BP(mean): --  RR: 33 (15 Sep 2017 09:00) (11 - 33)  SpO2: 87% (15 Sep 2017 09:00) (79% - 100%)    I&O's Summary    14 Sep 2017 07:01  -  15 Sep 2017 07:00  --------------------------------------------------------  IN: 1483 mL / OUT: 4775 mL / NET: -3292 mL    15 Sep 2017 07:01  -  15 Sep 2017 10:03  --------------------------------------------------------  IN: 389 mL / OUT: 60 mL / NET: 329 mL          Physical Exam:   GENERAL: NAD, well-groomed, well-developed  HEENT: ALONSO/   Atraumatic, Normocephalic  ENMT: No tonsillar erythema, exudates, or enlargement; Moist mucous membranes, Good dentition, No lesions  NECK: Supple, No JVD, Normal thyroid  CHEST/LUNG: Decreased air en try bilaterally   CVS: Regular rate and rhythm; No murmurs, rubs, or gallops  GI: : Soft, Nontender, Nondistended; Bowel sounds present  NERVOUS SYSTEM:  Alert & Oriented X3  EXTREMITIES:  2+ Peripheral Pulses, No clubbing, cyanosis, or edema  LYMPH: No lymphadenopathy noted  SKIN: No rashes or lesions  ENDOCRINOLOGY: No Thyromegaly  PSYCH: Appropriate    Labs:  ABG - ( 15 Sep 2017 00:12 )  pH: 7.50  /  pCO2: 33    /  pO2: 153   / HCO3: 26    / Base Excess: 3.1   /  SaO2: 100             36, 40, 40, 40, 40, 40, 40, 40, 40, 19, 100, 0, 0                            9.8    16.7  )-----------( 109      ( 15 Sep 2017 01:41 )             27.7                         10.0   17.4  )-----------( 121      ( 14 Sep 2017 15:30 )             29.4                         4.6    9.2   )-----------( 2        ( 14 Sep 2017 09:09 )             13.1                         8.9    18.8  )-----------( 130      ( 14 Sep 2017 08:46 )             26.0                         9.2    17.3  )-----------( 139      ( 14 Sep 2017 01:06 )             26.3                         9.0    9.1   )-----------( 161      ( 13 Sep 2017 01:47 )             25.9                         9.3    13.0  )-----------( 182      ( 12 Sep 2017 19:11 )             27.9                         9.1    14.1  )-----------( 177      ( 12 Sep 2017 15:38 )             27.6     09-15    129<L>  |  91<L>  |  22  ----------------------------<  139<H>  4.7   |  25  |  0.65  09-14    133<L>  |  93<L>  |  25<H>  ----------------------------<  139<H>  4.7   |  26  |  0.72  09-14    130<L>  |  95<L>  |  23  ----------------------------<  185<H>  5.1   |  20<L>  |  0.76  09-13    136  |  101  |  17  ----------------------------<  107<H>  4.7   |  21<L>  |  0.70  09-12    138  |  102  |  23  ----------------------------<  153<H>  3.9   |  18<L>  |  0.95  09-12    134<L>  |  98  |  27<H>  ----------------------------<  119<H>  4.3   |  23  |  1.07    Ca    9.4      15 Sep 2017 01:41  Ca    9.5      14 Sep 2017 15:54  Ca    9.1      14 Sep 2017 01:06  Phos  2.9     09-15    TPro  7.0  /  Alb  4.4  /  TBili  0.6  /  DBili  x   /  AST  78<H>  /  ALT  43  /  AlkPhos  64  09-15  TPro  7.1  /  Alb  4.0  /  TBili  0.6  /  DBili  x   /  AST  99<H>  /  ALT  46<H>  /  AlkPhos  63  09-14  TPro  6.5  /  Alb  3.8  /  TBili  0.5  /  DBili  x   /  AST  117<H>  /  ALT  46<H>  /  AlkPhos  57  09-14  TPro  6.1  /  Alb  3.8  /  TBili  0.8  /  DBili  x   /  AST  88<H>  /  ALT  40  /  AlkPhos  52  09-13  TPro  6.0  /  Alb  3.9  /  TBili  0.7  /  DBili  x   /  AST  67<H>  /  ALT  35  /  AlkPhos  57  09-12  TPro  7.3  /  Alb  3.7  /  TBili  0.4  /  DBili  x   /  AST  34  /  ALT  52<H>  /  AlkPhos  97  09-12    CAPILLARY BLOOD GLUCOSE  114 (15 Sep 2017 08:00)  154 (14 Sep 2017 22:00)  130 (14 Sep 2017 16:00)          LIVER FUNCTIONS - ( 15 Sep 2017 01:41 )  Alb: 4.4 g/dL / Pro: 7.0 g/dL / ALK PHOS: 64 U/L / ALT: 43 U/L RC / AST: 78 U/L / GGT: x           PT/INR - ( 15 Sep 2017 01:41 )   PT: 12.8 sec;   INR: 1.17 ratio         PTT - ( 15 Sep 2017 08:20 )  PTT:29.5 sec    Cultures:       < from: Xray Chest 1 View AP/PA (09.15.17 @ 03:39) >  M:  CHEST SINGLE AP OR PA                            PROCEDURE DATE:  09/15/2017            INTERPRETATION:  A single chest x-ray was obtained on 9/15/2017.    Indication: Status post cardiac surgery.    Impression:    The heart is enlarged. Left lower lobe pneumonia and/or atelectasis. The   right lung is clear. Mediastinal tubes are in place. A Cordis sheet   catheter seen on the right and tip is in superior vena cava. Left chest   tubes in place as well. No pneumothorax. A left ventricular assisted   device is in good position. Status post sternotomy. No changes in the   appearance of the chest when compared to previous study done September 14, 2017.                    ANIRUDH HUMPHREYS M.D., ATTENDING RADIOLOGIST  This document has been electronically signed. Sep 15 2017  8:29AM    < end of copied text >                        Studies  Chest X-RAY  CT SCAN Chest   Venous Dopplers: LE:   CT Abdomen  Others

## 2017-09-15 NOTE — PROGRESS NOTE ADULT - SUBJECTIVE AND OBJECTIVE BOX
INTERVAL HPI/OVERNIGHT EVENTS: Patient feeling well, denies dyspnea or pain. Eating lunch.     Allergies    No Known Allergies    Intolerances        ADVANCE DIRECTIVES:    DNR: [x ] NO [ ] YES (Date) MOLST [ ] NO [ ] YES (Date)    MEDICATIONS  (STANDING):  sodium chloride 0.45%. 1000 milliLiter(s) (10 mL/Hr) IV Continuous <Continuous>  pantoprazole  Injectable 40 milliGRAM(s) IV Push daily  milrinone Infusion 0.25 MICROgram(s)/kG/Min (4.5 mL/Hr) IV Continuous <Continuous>  vancomycin  IVPB 1000 milliGRAM(s) IV Intermittent every 12 hours  ciprofloxacin   IVPB 400 milliGRAM(s) IV Intermittent every 12 hours  aspirin enteric coated 81 milliGRAM(s) Oral daily  insulin lispro (HumaLOG) corrective regimen sliding scale   SubCutaneous three times a day before meals  insulin lispro (HumaLOG) corrective regimen sliding scale   SubCutaneous at bedtime  docusate sodium 100 milliGRAM(s) Oral three times a day  polyethylene glycol 3350 17 Gram(s) Oral daily  hydrALAZINE 10 milliGRAM(s) Oral every 8 hours  heparin  Infusion 500 Unit(s)/Hr (7 mL/Hr) IV Continuous <Continuous>  warfarin 5 milliGRAM(s) Oral once  buMETAnide Injectable 1 milliGRAM(s) IV Push every 12 hours    MEDICATIONS  (PRN):  oxyCODONE    5 mG/acetaminophen 325 mG 1 Tablet(s) Oral every 4 hours PRN Moderate Pain (4 - 6)  oxyCODONE    5 mG/acetaminophen 325 mG 2 Tablet(s) Oral every 4 hours PRN Severe Pain (7 - 10)      PRESENT SYMPTOMS:  Source: [x ] Patient   [ ] Family   [ ] Team     Pain:                        [x ] No [ ] Yes             [ ] Mild [ ] Moderate [ ] Severe    Dyspnea:                [ x] No [ ] Yes             [ ] Mild [ ] Moderate [ ] Severe    Anxiety:                  [ x] No [ ] Yes             [ ] Mild [ ] Moderate [ ] Severe    Fatigue:                  [ x] No [ ] Yes             [ ] Mild [ ] Moderate [ ] Severe    Nausea:                  [ x] No [ ] Yes             [ ] Mild [ ] Moderate [ ] Severe    Loss of appetite:   [x ] No [ ] Yes             [ ] Mild [ ] Moderate [ ] Severe    Constipation:        [x ] No [ ] Yes             [ ] Mild [ ] Moderate [ ] Severe    Other Symptoms:  [x ] All other review of systems negative   [ ] Unable to obtain due to poor mentation     Karnofsky Performance Score/Palliative Performance Status Version 2:     40    %    PHYSICAL EXAM:  Vital Signs Last 24 Hrs  T(C): 36.4 (15 Sep 2017 15:00), Max: 36.9 (15 Sep 2017 07:00)  T(F): 97.5 (15 Sep 2017 15:00), Max: 98.4 (15 Sep 2017 07:00)  HR: 97 (15 Sep 2017 15:00) (89 - 101)  BP: --  BP(mean): --  RR: 32 (15 Sep 2017 15:00) (11 - 33)  SpO2: 99% (15 Sep 2017 15:00) (79% - 100%) I&O's Summary    14 Sep 2017 07:01  -  15 Sep 2017 07:00  --------------------------------------------------------  IN: 1483 mL / OUT: 4775 mL / NET: -3292 mL    15 Sep 2017 07:01  -  15 Sep 2017 15:27  --------------------------------------------------------  IN: 1189.5 mL / OUT: 1180 mL / NET: 9.5 mL        General:  [x ] Alert  [ x] Oriented x      [ ] Lethargic  [ ] Agitated   [ ] Cachexia   [ ] Unarousable  [x ] Verbal  [ ] Non-Verbal    HEENT:  [x ] Normal   [ ] Dry mouth   [ ] ET Tube    [ ] Trach  [ ] Oral lesions    Lungs:   [x ] Clear [ ] Tachypnea  [ ] Audible excessive secretions   [ ] Rhonchi        [ ] Right [ ] Left [ ] Bilateral  [ ] Crackles        [ ] Right [ ] Left [ ] Bilateral  [ ] Wheezing     [ ] Right [ ] Left [ ] Bilateral    Cardiovascular:  [ x] Regular [ ] Irregular [ ] Tachycardia   [ ] Bradycardia  [ ] Murmur [ ] Other    Abdomen: [x ] Soft  [ ] Distended   [x ] +BS  [x ] Non tender [ ] Tender  [ ]PEG   [ ]OGT/ NGT   Last BM:       Genitourinary: [x ] Normal [ ] Incontinent   [ ] Oliguria/Anuria   [ ] Landrum    Musculoskeletal:  [ ] Normal   [x] Weakness  [ ] Bedbound/Wheelchair bound [ ] Edema    Neurological: [ x] No focal deficits  [ ] Cognitive impairment  [ ] Dysphagia [ ] Dysarthria [ ] Paresis [ ] Other     Skin: [ x] Normal   [ ] Pressure ulcer(s)                  [ ] Rash    LABS:    129<L>  |  91<L>  |  22  ----------------------------<  139<H>  4.7   |  25  |  0.65    Ca    9.4      15 Sep 2017 01:41  Phos  2.9     09-15    TPro  7.0  /  Alb  4.4  /  TBili  0.6  /  DBili  x   /  AST  78<H>  /  ALT  43  /  AlkPhos  64  09-15    PT/INR - ( 15 Sep 2017 01:41 )   PT: 12.8 sec;   INR: 1.17 ratio         PTT - ( 15 Sep 2017 08:20 )  PTT:29.5 sec  Urinalysis Basic - ( 15 Sep 2017 11:47 )    Color: Yellow / Appearance: Clear / S.020 / pH: x  Gluc: x / Ketone: Negative  / Bili: Negative / Urobili: Negative   Blood: x / Protein: Trace / Nitrite: Negative   Leuk Esterase: Negative / RBC: >50 /HPF / WBC 5-10 /HPF   Sq Epi: x / Non Sq Epi: x / Bacteria: x        Shock: [ ] Septic [x ] Cardiogenic [ ] Neurologic [ ] Hypovolemic  Vasopressors x   Inotrophs x 1    Oral Intake: [ ] Unable/mouth care only [ ] Minimal [ ] Moderate [ x] Full Capability  Diet: [ ] NPO [ ] Tube feeds [ ] TPN [ ] Other     RADIOLOGY & ADDITIONAL STUDIES: reviewed    REFERRALS:   [ ] Chaplaincy  [ ] Hospice  [ ] Child Life  [ ] Social Work  [ ] Case management [ ] Holistic Therapy       RADIOLOGY & ADDITIONAL STUDIES:

## 2017-09-16 LAB
ALBUMIN SERPL ELPH-MCNC: 3.5 G/DL — SIGNIFICANT CHANGE UP (ref 3.3–5)
ALP SERPL-CCNC: 94 U/L — SIGNIFICANT CHANGE UP (ref 40–120)
ALT FLD-CCNC: 44 U/L RC — SIGNIFICANT CHANGE UP (ref 10–45)
ANION GAP SERPL CALC-SCNC: 12 MMOL/L — SIGNIFICANT CHANGE UP (ref 5–17)
APTT BLD: 42.9 SEC — HIGH (ref 27.5–37.4)
AST SERPL-CCNC: 54 U/L — HIGH (ref 10–40)
BILIRUB SERPL-MCNC: 0.5 MG/DL — SIGNIFICANT CHANGE UP (ref 0.2–1.2)
BUN SERPL-MCNC: 19 MG/DL — SIGNIFICANT CHANGE UP (ref 7–23)
CALCIUM SERPL-MCNC: 9 MG/DL — SIGNIFICANT CHANGE UP (ref 8.4–10.5)
CHLORIDE SERPL-SCNC: 89 MMOL/L — LOW (ref 96–108)
CO2 SERPL-SCNC: 27 MMOL/L — SIGNIFICANT CHANGE UP (ref 22–31)
CREAT SERPL-MCNC: 0.57 MG/DL — SIGNIFICANT CHANGE UP (ref 0.5–1.3)
GAS PNL BLDA: SIGNIFICANT CHANGE UP
GAS PNL BLDA: SIGNIFICANT CHANGE UP
GLUCOSE SERPL-MCNC: 124 MG/DL — HIGH (ref 70–99)
HCT VFR BLD CALC: 26.2 % — LOW (ref 39–50)
HGB BLD-MCNC: 9.1 G/DL — LOW (ref 13–17)
INR BLD: 1.17 RATIO — HIGH (ref 0.88–1.16)
LDH SERPL L TO P-CCNC: 501 U/L — HIGH (ref 50–242)
MCHC RBC-ENTMCNC: 33.7 PG — SIGNIFICANT CHANGE UP (ref 27–34)
MCHC RBC-ENTMCNC: 34.7 GM/DL — SIGNIFICANT CHANGE UP (ref 32–36)
MCV RBC AUTO: 97.2 FL — SIGNIFICANT CHANGE UP (ref 80–100)
PLATELET # BLD AUTO: 102 K/UL — LOW (ref 150–400)
POTASSIUM SERPL-MCNC: 4.7 MMOL/L — SIGNIFICANT CHANGE UP (ref 3.5–5.3)
POTASSIUM SERPL-SCNC: 4.7 MMOL/L — SIGNIFICANT CHANGE UP (ref 3.5–5.3)
PROT SERPL-MCNC: 6.5 G/DL — SIGNIFICANT CHANGE UP (ref 6–8.3)
PROTHROM AB SERPL-ACNC: 12.7 SEC — SIGNIFICANT CHANGE UP (ref 9.8–12.7)
RBC # BLD: 2.69 M/UL — LOW (ref 4.2–5.8)
RBC # FLD: 13.9 % — SIGNIFICANT CHANGE UP (ref 10.3–14.5)
SODIUM SERPL-SCNC: 128 MMOL/L — LOW (ref 135–145)
WBC # BLD: 13 K/UL — HIGH (ref 3.8–10.5)
WBC # FLD AUTO: 13 K/UL — HIGH (ref 3.8–10.5)

## 2017-09-16 PROCEDURE — 93750 INTERROGATION VAD IN PERSON: CPT

## 2017-09-16 PROCEDURE — 99233 SBSQ HOSP IP/OBS HIGH 50: CPT | Mod: 25

## 2017-09-16 PROCEDURE — 99292 CRITICAL CARE ADDL 30 MIN: CPT

## 2017-09-16 PROCEDURE — 99291 CRITICAL CARE FIRST HOUR: CPT

## 2017-09-16 RX ORDER — HEPARIN SODIUM 5000 [USP'U]/ML
1100 INJECTION INTRAVENOUS; SUBCUTANEOUS
Qty: 25000 | Refills: 0 | Status: DISCONTINUED | OUTPATIENT
Start: 2017-09-16 | End: 2017-09-16

## 2017-09-16 RX ORDER — WARFARIN SODIUM 2.5 MG/1
5 TABLET ORAL ONCE
Qty: 0 | Refills: 0 | Status: COMPLETED | OUTPATIENT
Start: 2017-09-16 | End: 2017-09-16

## 2017-09-16 RX ORDER — HEPARIN SODIUM 5000 [USP'U]/ML
1200 INJECTION INTRAVENOUS; SUBCUTANEOUS
Qty: 25000 | Refills: 0 | Status: DISCONTINUED | OUTPATIENT
Start: 2017-09-16 | End: 2017-09-17

## 2017-09-16 RX ORDER — SODIUM CHLORIDE 9 MG/ML
1000 INJECTION INTRAMUSCULAR; INTRAVENOUS; SUBCUTANEOUS
Qty: 0 | Refills: 0 | Status: DISCONTINUED | OUTPATIENT
Start: 2017-09-16 | End: 2017-09-18

## 2017-09-16 RX ADMIN — OXYCODONE AND ACETAMINOPHEN 2 TABLET(S): 5; 325 TABLET ORAL at 21:00

## 2017-09-16 RX ADMIN — OXYCODONE AND ACETAMINOPHEN 2 TABLET(S): 5; 325 TABLET ORAL at 05:10

## 2017-09-16 RX ADMIN — Medication 100 MILLIGRAM(S): at 10:25

## 2017-09-16 RX ADMIN — POLYETHYLENE GLYCOL 3350 17 GRAM(S): 17 POWDER, FOR SOLUTION ORAL at 10:25

## 2017-09-16 RX ADMIN — OXYCODONE AND ACETAMINOPHEN 2 TABLET(S): 5; 325 TABLET ORAL at 20:30

## 2017-09-16 RX ADMIN — Medication 10 MILLIGRAM(S): at 14:39

## 2017-09-16 RX ADMIN — HEPARIN SODIUM 10 UNIT(S)/HR: 5000 INJECTION INTRAVENOUS; SUBCUTANEOUS at 04:00

## 2017-09-16 RX ADMIN — OXYCODONE AND ACETAMINOPHEN 2 TABLET(S): 5; 325 TABLET ORAL at 10:45

## 2017-09-16 RX ADMIN — OXYCODONE AND ACETAMINOPHEN 2 TABLET(S): 5; 325 TABLET ORAL at 04:40

## 2017-09-16 RX ADMIN — OXYCODONE AND ACETAMINOPHEN 2 TABLET(S): 5; 325 TABLET ORAL at 10:25

## 2017-09-16 RX ADMIN — Medication 81 MILLIGRAM(S): at 10:25

## 2017-09-16 RX ADMIN — BUMETANIDE 1 MILLIGRAM(S): 0.25 INJECTION INTRAMUSCULAR; INTRAVENOUS at 14:39

## 2017-09-16 RX ADMIN — Medication 10 MILLIGRAM(S): at 05:54

## 2017-09-16 RX ADMIN — Medication 100 MILLIGRAM(S): at 21:47

## 2017-09-16 RX ADMIN — Medication 10 MILLIGRAM(S): at 21:47

## 2017-09-16 RX ADMIN — Medication 100 MILLIGRAM(S): at 05:54

## 2017-09-16 RX ADMIN — WARFARIN SODIUM 5 MILLIGRAM(S): 2.5 TABLET ORAL at 21:48

## 2017-09-16 NOTE — PROGRESS NOTE ADULT - SUBJECTIVE AND OBJECTIVE BOX
Interval History:  doing well  reports pain at pump pocket site  chest tube output decreased; plan to remove mediastinal drain  ambulated     Medications:  milrinone Infusion 0.125 MICROgram(s)/kG/Min IV Continuous <Continuous>  aspirin enteric coated 81 milliGRAM(s) Oral daily  insulin lispro (HumaLOG) corrective regimen sliding scale   SubCutaneous three times a day before meals  insulin lispro (HumaLOG) corrective regimen sliding scale   SubCutaneous at bedtime  oxyCODONE    5 mG/acetaminophen 325 mG 1 Tablet(s) Oral every 4 hours PRN  oxyCODONE    5 mG/acetaminophen 325 mG 2 Tablet(s) Oral every 4 hours PRN  docusate sodium 100 milliGRAM(s) Oral three times a day  polyethylene glycol 3350 17 Gram(s) Oral daily  hydrALAZINE 10 milliGRAM(s) Oral every 8 hours  buMETAnide Injectable 1 milliGRAM(s) IV Push daily  heparin  Infusion 1100 Unit(s)/Hr IV Continuous <Continuous>  sodium chloride 0.9%. 1000 milliLiter(s) IV Continuous <Continuous>    Vitals:  T(C): 36.9 (17 @ 14:00), Max: 37 (17 @ 08:00)  HR: 100 (17 @ 14:00) (91 - 107)  ABP: 79/66 (17 @ 14:00) (79/66 - 106/85)  ABP(mean): 72 (17 @ 14:00) (72 - 95)  RR: 16 (17 @ 14:00) (11 - 63)  SpO2: 98% (17 @ 14:00) (95% - 100%)    Daily     Daily Weight in k.1 (16 Sep 2017 00:00)    I&O's Summary    15 Sep 2017 07:  -  16 Sep 2017 07:00  --------------------------------------------------------  IN: 2458 mL / OUT: 3310 mL / NET: -852 mL    16 Sep 2017 07:  -  16 Sep 2017 15:04  --------------------------------------------------------  IN: 49 mL / OUT: 760 mL / NET: -711 mL    Physical Exam:  Appearance: No Acute Distress  HEENT: Mild JVD  Cardiovascular: Normal S1 S2, No murmurs/rubs/gallops. LVAD hum  Respiratory: Clear to auscultation bilaterally  Gastrointestinal: Soft, Non-tender	  Skin: No cyanosis	  Neurologic: Non-focal  Extremities: No LE edema  Psychiatry: A & O x 3, Mood & affect appropriate    LVAD Interrogation:  Pump Flow: 9000  Pump Speed: 4.2  Pulse Index: 7  Pump Power: 4.7  VAD Events: no events  Driveline evaluation: clean dry and intact  Programming Changes: No changes made    Labs:                        9.1    13.0  )-----------( 102      ( 16 Sep 2017 02:05 )             26.2         128<L>  |  89<L>  |  19  ----------------------------<  124<H>  4.7   |  27  |  0.57    Ca    9.0      16 Sep 2017 02:05  Phos  2.9     09-15    TPro  6.5  /  Alb  3.5  /  TBili  0.5  /  DBili  x   /  AST  54<H>  /  ALT  44  /  AlkPhos  94      PT/INR - ( 16 Sep 2017 08:18 )   PT: 12.7 sec;   INR: 1.17 ratio      PTT - ( 16 Sep 2017 08:18 )  PTT:39.9 sec    Lactate Dehydrogenase, Serum: 501 U/L ( @ 02:05)  Lactate Dehydrogenase, Serum: 603 U/L (09-15 @ 01:41)  Lactate Dehydrogenase, Serum: 535 U/L ( @ 01:06)    TELEMETRY:  no events

## 2017-09-16 NOTE — PROGRESS NOTE ADULT - ASSESSMENT
Briefly, 61 y/o Creole speaking male who presented with abdominal pain and was found to be in low output heart failure with cardiogenic shock (EF 10%) underwent LVAD/TV annuloplasty for INTERMACS 2 9/12; postop had bleeding requiring PRBC, amicar, PLT, cryo. Inotropes titrated down. TTE done which showed LVEDD 5.9 cm, mild-mod MR, aortic valve closed, mild RV dysfunction, mild TR. Extubated. Doing well. Remains on milrinone 0.25. Had some further bleeding while on anticoagulation from left chest tube which slowed down with Amicar. CVP improved.   - BP improved on hydral 10 mg q8h; goal MAP 70-80  - goal CVP 6-8; s/p bumex 1 mg; continue 1 mg daily  - OOB to chair  - venous sat adequate; can reduce milrinone to 0.125; likely d/c tomorrow  - monitor H/H closely for bleeding; anticoagulation per CT surgery  - LDH fairly stable; no power spikes  - thrombocytopenia; d/c PPI and check HIT Ab

## 2017-09-16 NOTE — PROGRESS NOTE ADULT - SUBJECTIVE AND OBJECTIVE BOX
CRITICAL CARE ATTENDING - CTICU    MEDICATIONS  (STANDING):  milrinone Infusion 0.125 MICROgram(s)/kG/Min (2.25 mL/Hr) IV Continuous <Continuous>  aspirin enteric coated 81 milliGRAM(s) Oral daily  insulin lispro (HumaLOG) corrective regimen sliding scale   SubCutaneous three times a day before meals  insulin lispro (HumaLOG) corrective regimen sliding scale   SubCutaneous at bedtime  docusate sodium 100 milliGRAM(s) Oral three times a day  polyethylene glycol 3350 17 Gram(s) Oral daily  hydrALAZINE 10 milliGRAM(s) Oral every 8 hours  buMETAnide Injectable 1 milliGRAM(s) IV Push daily  heparin  Infusion 1100 Unit(s)/Hr (11 mL/Hr) IV Continuous <Continuous>  sodium chloride 0.9%. 1000 milliLiter(s) (10 mL/Hr) IV Continuous <Continuous>                                    9.1    13.0  )-----------( 102      ( 16 Sep 2017 02:05 )             26.2           128<L>  |  89<L>  |  19  ----------------------------<  124<H>  4.7   |  27  |  0.57    Ca    9.0      16 Sep 2017 02:05  Phos  2.9     09-15    TPro  6.5  /  Alb  3.5  /  TBili  0.5  /  DBili  x   /  AST  54<H>  /  ALT  44  /  AlkPhos  94        PT/INR - ( 16 Sep 2017 08:18 )   PT: 12.7 sec;   INR: 1.17 ratio         PTT - ( 16 Sep 2017 08:18 )  PTT:39.9 sec        Daily     Daily Weight in k.1 (16 Sep 2017 00:00)      09-15 @ 07:  -   @ 07:00  --------------------------------------------------------  IN: 2458 mL / OUT: 3310 mL / NET: -852 mL     @ 07:01  -   @ 11:07  --------------------------------------------------------  IN: 49 mL / OUT: 40 mL / NET: 9 mL        Critically Ill patient  : [ ] preoperative ,   [x ] post operative    Requires :  [x ] Arterial Line   [x ] Central Line  [ ] PA catheter  [ ] IABP  [ ] ECMO  [x ] LVAD  [ ] Ventilator  [ ] pacemaker [ ] Impella.    Bedside evaluation , monitoring , treatment of hemodynamics , fluids , IVP/ IVCD meds.        Diagnosis:     POD 4: LVAD / TV ring    Cardiogenic Shock  - resolving    CHF- acute [x ]   chronic [x ]    systolic [x ]   diatolic [ ]          - Echo- EF -10%             [ ] RV dysfunction          - Cxr-cardiomegally, edema          - Clinical-  [x ]inotropes   [ ]pressors   [x ]diuresis   [ ]IABP   [ ]ECMO   [x ]LVAD   [ ]Respiratory Failure    Hemodynamic lability,instability. Requires IVCD [ ] vasopressors [x ] inotropes  [ ] vasodilator  [ ]IVSS fluid  to maintain MAP, perfusion, C.I.     LVAD circuit    IVCD anticoagulation with [x ] Heparin  for LVAD    Requires bedside physical therapy, mobilization and total longterm care.     Hypotension a/w Hypertension, requiring intravenous medication.     ? COPD ?                                -                     Discussed with CT surgeon, Physician's Assistant - Nurse Practitioner- Critical care medicine team.   Dicussed at  AM / PM rounds.   Chart, labs , films reviewed.    Total Time: 120 min.

## 2017-09-17 LAB
ALBUMIN SERPL ELPH-MCNC: 3.1 G/DL — LOW (ref 3.3–5)
ALP SERPL-CCNC: 91 U/L — SIGNIFICANT CHANGE UP (ref 40–120)
ALT FLD-CCNC: 32 U/L RC — SIGNIFICANT CHANGE UP (ref 10–45)
ANION GAP SERPL CALC-SCNC: 10 MMOL/L — SIGNIFICANT CHANGE UP (ref 5–17)
APTT BLD: 59.5 SEC — HIGH (ref 27.5–37.4)
APTT BLD: 69.4 SEC — HIGH (ref 27.5–37.4)
APTT BLD: 86.2 SEC — HIGH (ref 27.5–37.4)
AST SERPL-CCNC: 36 U/L — SIGNIFICANT CHANGE UP (ref 10–40)
BILIRUB SERPL-MCNC: 0.4 MG/DL — SIGNIFICANT CHANGE UP (ref 0.2–1.2)
BUN SERPL-MCNC: 17 MG/DL — SIGNIFICANT CHANGE UP (ref 7–23)
CALCIUM SERPL-MCNC: 9 MG/DL — SIGNIFICANT CHANGE UP (ref 8.4–10.5)
CHLORIDE SERPL-SCNC: 90 MMOL/L — LOW (ref 96–108)
CO2 SERPL-SCNC: 30 MMOL/L — SIGNIFICANT CHANGE UP (ref 22–31)
CREAT SERPL-MCNC: 0.59 MG/DL — SIGNIFICANT CHANGE UP (ref 0.5–1.3)
GAS PNL BLDA: SIGNIFICANT CHANGE UP
GLUCOSE SERPL-MCNC: 113 MG/DL — HIGH (ref 70–99)
HCT VFR BLD CALC: 29 % — LOW (ref 39–50)
HGB BLD-MCNC: 9.5 G/DL — LOW (ref 13–17)
LDH SERPL L TO P-CCNC: 469 U/L — HIGH (ref 50–242)
MCHC RBC-ENTMCNC: 32.3 PG — SIGNIFICANT CHANGE UP (ref 27–34)
MCHC RBC-ENTMCNC: 32.7 GM/DL — SIGNIFICANT CHANGE UP (ref 32–36)
MCV RBC AUTO: 98.7 FL — SIGNIFICANT CHANGE UP (ref 80–100)
PHOSPHATE SERPL-MCNC: 2.8 MG/DL — SIGNIFICANT CHANGE UP (ref 2.5–4.5)
PLATELET # BLD AUTO: 113 K/UL — LOW (ref 150–400)
POTASSIUM SERPL-MCNC: 4.4 MMOL/L — SIGNIFICANT CHANGE UP (ref 3.5–5.3)
POTASSIUM SERPL-SCNC: 4.4 MMOL/L — SIGNIFICANT CHANGE UP (ref 3.5–5.3)
PROT SERPL-MCNC: 6.3 G/DL — SIGNIFICANT CHANGE UP (ref 6–8.3)
RBC # BLD: 2.93 M/UL — LOW (ref 4.2–5.8)
RBC # FLD: 13.8 % — SIGNIFICANT CHANGE UP (ref 10.3–14.5)
SODIUM SERPL-SCNC: 130 MMOL/L — LOW (ref 135–145)
WBC # BLD: 11.2 K/UL — HIGH (ref 3.8–10.5)
WBC # FLD AUTO: 11.2 K/UL — HIGH (ref 3.8–10.5)

## 2017-09-17 PROCEDURE — 71010: CPT | Mod: 26

## 2017-09-17 PROCEDURE — 93750 INTERROGATION VAD IN PERSON: CPT

## 2017-09-17 PROCEDURE — 99233 SBSQ HOSP IP/OBS HIGH 50: CPT | Mod: 25,GC

## 2017-09-17 RX ORDER — FUROSEMIDE 40 MG
20 TABLET ORAL DAILY
Qty: 0 | Refills: 0 | Status: DISCONTINUED | OUTPATIENT
Start: 2017-09-17 | End: 2017-09-20

## 2017-09-17 RX ORDER — HYDROMORPHONE HYDROCHLORIDE 2 MG/ML
0.5 INJECTION INTRAMUSCULAR; INTRAVENOUS; SUBCUTANEOUS ONCE
Qty: 0 | Refills: 0 | Status: DISCONTINUED | OUTPATIENT
Start: 2017-09-17 | End: 2017-09-17

## 2017-09-17 RX ORDER — POTASSIUM CHLORIDE 20 MEQ
40 PACKET (EA) ORAL ONCE
Qty: 0 | Refills: 0 | Status: COMPLETED | OUTPATIENT
Start: 2017-09-17 | End: 2017-09-17

## 2017-09-17 RX ORDER — PANTOPRAZOLE SODIUM 20 MG/1
40 TABLET, DELAYED RELEASE ORAL
Qty: 0 | Refills: 0 | Status: DISCONTINUED | OUTPATIENT
Start: 2017-09-17 | End: 2017-10-25

## 2017-09-17 RX ORDER — BUMETANIDE 0.25 MG/ML
1 INJECTION INTRAMUSCULAR; INTRAVENOUS DAILY
Qty: 0 | Refills: 0 | Status: DISCONTINUED | OUTPATIENT
Start: 2017-09-17 | End: 2017-09-17

## 2017-09-17 RX ORDER — WARFARIN SODIUM 2.5 MG/1
2 TABLET ORAL ONCE
Qty: 0 | Refills: 0 | Status: COMPLETED | OUTPATIENT
Start: 2017-09-17 | End: 2017-09-17

## 2017-09-17 RX ORDER — LISINOPRIL 2.5 MG/1
5 TABLET ORAL DAILY
Qty: 0 | Refills: 0 | Status: DISCONTINUED | OUTPATIENT
Start: 2017-09-17 | End: 2017-09-20

## 2017-09-17 RX ADMIN — HEPARIN SODIUM 12 UNIT(S)/HR: 5000 INJECTION INTRAVENOUS; SUBCUTANEOUS at 00:37

## 2017-09-17 RX ADMIN — OXYCODONE AND ACETAMINOPHEN 2 TABLET(S): 5; 325 TABLET ORAL at 00:50

## 2017-09-17 RX ADMIN — Medication 1: at 11:40

## 2017-09-17 RX ADMIN — Medication 81 MILLIGRAM(S): at 11:40

## 2017-09-17 RX ADMIN — OXYCODONE AND ACETAMINOPHEN 2 TABLET(S): 5; 325 TABLET ORAL at 05:45

## 2017-09-17 RX ADMIN — HYDROMORPHONE HYDROCHLORIDE 0.5 MILLIGRAM(S): 2 INJECTION INTRAMUSCULAR; INTRAVENOUS; SUBCUTANEOUS at 12:25

## 2017-09-17 RX ADMIN — Medication 100 MILLIGRAM(S): at 22:10

## 2017-09-17 RX ADMIN — HYDROMORPHONE HYDROCHLORIDE 0.5 MILLIGRAM(S): 2 INJECTION INTRAMUSCULAR; INTRAVENOUS; SUBCUTANEOUS at 11:45

## 2017-09-17 RX ADMIN — POLYETHYLENE GLYCOL 3350 17 GRAM(S): 17 POWDER, FOR SOLUTION ORAL at 11:40

## 2017-09-17 RX ADMIN — Medication 100 MILLIGRAM(S): at 13:20

## 2017-09-17 RX ADMIN — Medication 1: at 17:38

## 2017-09-17 RX ADMIN — OXYCODONE AND ACETAMINOPHEN 2 TABLET(S): 5; 325 TABLET ORAL at 05:03

## 2017-09-17 RX ADMIN — Medication 20 MILLIGRAM(S): at 11:43

## 2017-09-17 RX ADMIN — Medication 10 MILLIGRAM(S): at 13:20

## 2017-09-17 RX ADMIN — PANTOPRAZOLE SODIUM 40 MILLIGRAM(S): 20 TABLET, DELAYED RELEASE ORAL at 08:23

## 2017-09-17 RX ADMIN — OXYCODONE AND ACETAMINOPHEN 2 TABLET(S): 5; 325 TABLET ORAL at 15:15

## 2017-09-17 RX ADMIN — OXYCODONE AND ACETAMINOPHEN 2 TABLET(S): 5; 325 TABLET ORAL at 00:19

## 2017-09-17 RX ADMIN — OXYCODONE AND ACETAMINOPHEN 2 TABLET(S): 5; 325 TABLET ORAL at 15:45

## 2017-09-17 RX ADMIN — HYDROMORPHONE HYDROCHLORIDE 0.5 MILLIGRAM(S): 2 INJECTION INTRAMUSCULAR; INTRAVENOUS; SUBCUTANEOUS at 19:00

## 2017-09-17 RX ADMIN — HYDROMORPHONE HYDROCHLORIDE 0.5 MILLIGRAM(S): 2 INJECTION INTRAMUSCULAR; INTRAVENOUS; SUBCUTANEOUS at 19:15

## 2017-09-17 RX ADMIN — Medication 10 MILLIGRAM(S): at 22:10

## 2017-09-17 RX ADMIN — HYDROMORPHONE HYDROCHLORIDE 0.5 MILLIGRAM(S): 2 INJECTION INTRAMUSCULAR; INTRAVENOUS; SUBCUTANEOUS at 11:10

## 2017-09-17 RX ADMIN — WARFARIN SODIUM 2 MILLIGRAM(S): 2.5 TABLET ORAL at 22:10

## 2017-09-17 RX ADMIN — LISINOPRIL 5 MILLIGRAM(S): 2.5 TABLET ORAL at 11:45

## 2017-09-17 RX ADMIN — Medication 100 MILLIGRAM(S): at 05:03

## 2017-09-17 RX ADMIN — Medication 40 MILLIEQUIVALENT(S): at 03:15

## 2017-09-17 RX ADMIN — Medication 10 MILLIGRAM(S): at 05:03

## 2017-09-17 RX ADMIN — HYDROMORPHONE HYDROCHLORIDE 0.5 MILLIGRAM(S): 2 INJECTION INTRAMUSCULAR; INTRAVENOUS; SUBCUTANEOUS at 10:55

## 2017-09-17 RX ADMIN — HEPARIN SODIUM 11 UNIT(S)/HR: 5000 INJECTION INTRAVENOUS; SUBCUTANEOUS at 05:04

## 2017-09-17 RX ADMIN — MILRINONE LACTATE 2.25 MICROGRAM(S)/KG/MIN: 1 INJECTION, SOLUTION INTRAVENOUS at 00:37

## 2017-09-17 NOTE — PROGRESS NOTE ADULT - PROBLEM SELECTOR PLAN 1
patient is off bronchodilators at this time. He has not been wheezing  but does have underlying COPD: Suggest to add spiriva, if it is ok with primary team. The ct scan chest does show mild emphysema and pt used to smoke: Keep his o2 sao2 above 88%. Chest radiograph reviewed, and I think it is atelectasis rather then pneumonia at left base: Pt has inproving  leucocytosis and T Max of 99. off antibiotics now!!

## 2017-09-17 NOTE — PROGRESS NOTE ADULT - ATTENDING COMMENTS
Briefly, 63 y/o Creole speaking male who presented with abdominal pain and was found to be in low output heart failure with cardiogenic shock (EF 10%) underwent LVAD/TV annuloplasty for INTERMACS 2 9/12; postop had bleeding requiring PRBC, amicar, PLT, cryo. Inotropes titrated down. TTE done which showed LVEDD 5.9 cm, mild-mod MR, aortic valve closed, mild RV dysfunction, mild TR. Extubated. Doing well. Remains on milrinone 0.125. Had some further bleeding while on anticoagulation from left chest tube which slowed down with Amicar. CVP improved.   - BP improved on hydral 10 mg q8h; goal MAP 70-80  - goal CVP 6-8; change bumex to lasix 20 mg po daily  - BP improved on hydral; start lisinopril 5 mg daily and continue hydral 10 mg q8h for now  - OOB to chair  - venous sat adequate; d/c milrinone today  - monitor H/H closely for bleeding; anticoagulation per CT surgery  - LDH fairly stable; no power spikes  - thrombocytopenia; d/c PPI and check HIT Ab

## 2017-09-17 NOTE — PROGRESS NOTE ADULT - SUBJECTIVE AND OBJECTIVE BOX
24H hour events: No acute events. Incisional site pain.    MEDICATIONS:  milrinone Infusion 0.125 MICROgram(s)/kG/Min IV Continuous <Continuous>  aspirin enteric coated 81 milliGRAM(s) Oral daily  hydrALAZINE 10 milliGRAM(s) Oral every 8 hours  buMETAnide Injectable 1 milliGRAM(s) IV Push daily  heparin  Infusion 1200 Unit(s)/Hr IV Continuous <Continuous>    oxyCODONE    5 mG/acetaminophen 325 mG 1 Tablet(s) Oral every 4 hours PRN  oxyCODONE    5 mG/acetaminophen 325 mG 2 Tablet(s) Oral every 4 hours PRN  HYDROmorphone  Injectable 0.5 milliGRAM(s) IV Push once    docusate sodium 100 milliGRAM(s) Oral three times a day  polyethylene glycol 3350 17 Gram(s) Oral daily  pantoprazole    Tablet 40 milliGRAM(s) Oral before breakfast    insulin lispro (HumaLOG) corrective regimen sliding scale   SubCutaneous three times a day before meals  insulin lispro (HumaLOG) corrective regimen sliding scale   SubCutaneous at bedtime    sodium chloride 0.9%. 1000 milliLiter(s) IV Continuous <Continuous>    REVIEW OF SYSTEMS:  Denies CP, SOB, orthopnea, ext swelling.  Remaining 10point ROS negative.    PHYSICAL EXAM:  T(C): 36.4 (09-17-17 @ 08:00), Max: 37.2 (09-16-17 @ 18:00)  HR: 106 (09-17-17 @ 10:00) (96 - 106)  BP: --  RR: 17 (09-17-17 @ 10:00) (13 - 33)  SpO2: 100% (09-17-17 @ 10:00) (96% - 100%)  Wt(kg): --  I&O's Summary    16 Sep 2017 07:01  -  17 Sep 2017 07:00  --------------------------------------------------------  IN: 225.4 mL / OUT: 3060 mL / NET: -2834.6 mL    17 Sep 2017 07:01  -  17 Sep 2017 11:14  --------------------------------------------------------  IN: 2.3 mL / OUT: 410 mL / NET: -407.7 mL      Appearance: Normal	  HEENT:   Normal oral mucosa, PERRL, EOMI	  Lymphatic: No lymphadenopathy  Cardiovascular: lvad background noise, No JVD, No murmurs, No edema  Respiratory: midaxillary cta	  Psychiatry: A & O x 3, Mood & affect appropriate  Gastrointestinal:  Soft, Non-tender, + BS	  subcostal driveline incision bandage w/ dried blood, no active bleed visible  Skin: No rashes, No ecchymoses, No cyanosis	  Neurologic: Non-focal  Extremities: Normal range of motion, No clubbing, cyanosis or edema  Vascular: Peripheral pulses palpable 2+ bilaterally      LABS:	 	    CBC Full  -  ( 17 Sep 2017 01:28 )  WBC Count : 11.2 K/uL  Hemoglobin : 9.5 g/dL  Hematocrit : 29.0 %  Platelet Count - Automated : 113 K/uL  Mean Cell Volume : 98.7 fl  Mean Cell Hemoglobin : 32.3 pg  Mean Cell Hemoglobin Concentration : 32.7 gm/dL  Auto Neutrophil # : x  Auto Lymphocyte # : x  Auto Monocyte # : x  Auto Eosinophil # : x  Auto Basophil # : x  Auto Neutrophil % : x  Auto Lymphocyte % : x  Auto Monocyte % : x  Auto Eosinophil % : x  Auto Basophil % : x    09-17    130<L>  |  90<L>  |  17  ----------------------------<  113<H>  4.4   |  30  |  0.59  09-16    128<L>  |  89<L>  |  19  ----------------------------<  124<H>  4.7   |  27  |  0.57    Ca    9.0      17 Sep 2017 01:28  Ca    9.0      16 Sep 2017 02:05  Phos  2.8     09-17    TPro  6.3  /  Alb  3.1<L>  /  TBili  0.4  /  DBili  x   /  AST  36  /  ALT  32  /  AlkPhos  91  09-17  TPro  6.5  /  Alb  3.5  /  TBili  0.5  /  DBili  x   /  AST  54<H>  /  ALT  44  /  AlkPhos  94  09-16    TELEMETRY: sinus 100        	  ASSESSMENT/PLAN: 	    62 year old Creole speaking man who presented with abdominal pain and was found to be in low output heart failure with cardiogenic shock (EF 10%) underwent LVAD/TV annuloplasty for INTERMACS 2 9/12;   -postop had bleeding requiring PRBC, amicar, PLT, cryo. Inotropes titrated down. TTE done which showed LVEDD 5.9 cm, mild-mod MR, aortic valve closed, mild RV dysfunction, mild TR. Extubated. Doing well.     - BP improved on hydral 10 mg q8h; goal MAP 70-80  - goal CVP 6-8; s/p bumex 1 mg; continue 1 mg daily  - OOB to chair  - venous sat adequate; can reduce milrinone to 0.125; likely d/c tomorrow  - monitor H/H closely for bleeding; anticoagulation per CT surgery  - LDH fairly stable; no power spikes  - thrombocytopenia; d/c PPI and check HIT Ab    90824 24H hour events: No acute events. Incisional site pain.    MEDICATIONS:  milrinone Infusion 0.125 MICROgram(s)/kG/Min IV Continuous <Continuous>  aspirin enteric coated 81 milliGRAM(s) Oral daily  hydrALAZINE 10 milliGRAM(s) Oral every 8 hours  buMETAnide Injectable 1 milliGRAM(s) IV Push daily  heparin  Infusion 1200 Unit(s)/Hr IV Continuous <Continuous>    oxyCODONE    5 mG/acetaminophen 325 mG 1 Tablet(s) Oral every 4 hours PRN  oxyCODONE    5 mG/acetaminophen 325 mG 2 Tablet(s) Oral every 4 hours PRN  HYDROmorphone  Injectable 0.5 milliGRAM(s) IV Push once    docusate sodium 100 milliGRAM(s) Oral three times a day  polyethylene glycol 3350 17 Gram(s) Oral daily  pantoprazole    Tablet 40 milliGRAM(s) Oral before breakfast    insulin lispro (HumaLOG) corrective regimen sliding scale   SubCutaneous three times a day before meals  insulin lispro (HumaLOG) corrective regimen sliding scale   SubCutaneous at bedtime    sodium chloride 0.9%. 1000 milliLiter(s) IV Continuous <Continuous>    REVIEW OF SYSTEMS:  Denies CP, SOB, orthopnea, ext swelling.  Remaining 10point ROS negative.    PHYSICAL EXAM:  T(C): 36.4 (09-17-17 @ 08:00), Max: 37.2 (09-16-17 @ 18:00)  HR: 106 (09-17-17 @ 10:00) (96 - 106)  BP: --  RR: 17 (09-17-17 @ 10:00) (13 - 33)  SpO2: 100% (09-17-17 @ 10:00) (96% - 100%)  Wt(kg): --  I&O's Summary    16 Sep 2017 07:01  -  17 Sep 2017 07:00  --------------------------------------------------------  IN: 225.4 mL / OUT: 3060 mL / NET: -2834.6 mL    17 Sep 2017 07:01  -  17 Sep 2017 11:14  --------------------------------------------------------  IN: 2.3 mL / OUT: 410 mL / NET: -407.7 mL      Appearance: Normal	  HEENT:   Normal oral mucosa, PERRL, EOMI	  Lymphatic: No lymphadenopathy  Cardiovascular: lvad background noise, No JVD, No murmurs, No edema  Respiratory: midaxillary cta	  Psychiatry: A & O x 3, Mood & affect appropriate  Gastrointestinal:  Soft, Non-tender, + BS	  subcostal driveline incision bandage w/ dried blood, no active bleed visible  Skin: No rashes, No ecchymoses, No cyanosis	  Neurologic: Non-focal  Extremities: Normal range of motion, No clubbing, cyanosis or edema  Vascular: Peripheral pulses palpable 2+ bilaterally      LABS:	 	    CBC Full  -  ( 17 Sep 2017 01:28 )  WBC Count : 11.2 K/uL  Hemoglobin : 9.5 g/dL  Hematocrit : 29.0 %  Platelet Count - Automated : 113 K/uL  Mean Cell Volume : 98.7 fl  Mean Cell Hemoglobin : 32.3 pg  Mean Cell Hemoglobin Concentration : 32.7 gm/dL  Auto Neutrophil # : x  Auto Lymphocyte # : x  Auto Monocyte # : x  Auto Eosinophil # : x  Auto Basophil # : x  Auto Neutrophil % : x  Auto Lymphocyte % : x  Auto Monocyte % : x  Auto Eosinophil % : x  Auto Basophil % : x    09-17    130<L>  |  90<L>  |  17  ----------------------------<  113<H>  4.4   |  30  |  0.59  09-16    128<L>  |  89<L>  |  19  ----------------------------<  124<H>  4.7   |  27  |  0.57    Ca    9.0      17 Sep 2017 01:28  Ca    9.0      16 Sep 2017 02:05  Phos  2.8     09-17    TPro  6.3  /  Alb  3.1<L>  /  TBili  0.4  /  DBili  x   /  AST  36  /  ALT  32  /  AlkPhos  91  09-17  TPro  6.5  /  Alb  3.5  /  TBili  0.5  /  DBili  x   /  AST  54<H>  /  ALT  44  /  AlkPhos  94  09-16    TELEMETRY: sinus 100        	  ASSESSMENT/PLAN: 	    62 year old Creole speaking man who presented with abdominal pain and was found to be in low output heart failure with cardiogenic shock (EF 10%) underwent LVAD/TV annuloplasty for INTERMACS 2 9/12;   -postop had bleeding requiring PRBC, amicar, PLT, cryo. Inotropes titrated down. TTE done which showed LVEDD 5.9 cm, mild-mod MR, aortic valve closed, mild RV dysfunction, mild TR. Extubated. Doing well.     balance -2.8L,     - BP improved on hydral 10 mg q8h; goal MAP 70-80 **inc hydralazine to 25mg q8h  - bumex continue 1 mg daily  - OOB to chair  - milrinone to 0.125; **likely d/c today  - hgb stable 9s  - LDH fairly stable; no power spikes  - thrombocytopenia neglibible improvement; d/c PPI and check HIT Ab    67785 24H hour events: No acute events. Incisional site pain.    MEDICATIONS:  milrinone Infusion 0.125 MICROgram(s)/kG/Min IV Continuous <Continuous>  aspirin enteric coated 81 milliGRAM(s) Oral daily  hydrALAZINE 10 milliGRAM(s) Oral every 8 hours  buMETAnide Injectable 1 milliGRAM(s) IV Push daily  heparin  Infusion 1200 Unit(s)/Hr IV Continuous <Continuous>    oxyCODONE    5 mG/acetaminophen 325 mG 1 Tablet(s) Oral every 4 hours PRN  oxyCODONE    5 mG/acetaminophen 325 mG 2 Tablet(s) Oral every 4 hours PRN  HYDROmorphone  Injectable 0.5 milliGRAM(s) IV Push once    docusate sodium 100 milliGRAM(s) Oral three times a day  polyethylene glycol 3350 17 Gram(s) Oral daily  pantoprazole    Tablet 40 milliGRAM(s) Oral before breakfast    insulin lispro (HumaLOG) corrective regimen sliding scale   SubCutaneous three times a day before meals  insulin lispro (HumaLOG) corrective regimen sliding scale   SubCutaneous at bedtime    sodium chloride 0.9%. 1000 milliLiter(s) IV Continuous <Continuous>    REVIEW OF SYSTEMS:  Denies CP, SOB, orthopnea, ext swelling.  Remaining 10point ROS negative.    PHYSICAL EXAM:  T(C): 36.4 (09-17-17 @ 08:00), Max: 37.2 (09-16-17 @ 18:00)  HR: 106 (09-17-17 @ 10:00) (96 - 106)  BP: --  RR: 17 (09-17-17 @ 10:00) (13 - 33)  SpO2: 100% (09-17-17 @ 10:00) (96% - 100%)  Wt(kg): --  I&O's Summary    16 Sep 2017 07:01  -  17 Sep 2017 07:00  --------------------------------------------------------  IN: 225.4 mL / OUT: 3060 mL / NET: -2834.6 mL    17 Sep 2017 07:01  -  17 Sep 2017 11:14  --------------------------------------------------------  IN: 2.3 mL / OUT: 410 mL / NET: -407.7 mL      Appearance: Normal	  HEENT:   Normal oral mucosa, PERRL, EOMI	  Lymphatic: No lymphadenopathy  Cardiovascular: lvad background noise, No JVD, No murmurs, No edema  Respiratory: midaxillary cta	  Psychiatry: A & O x 3, Mood & affect appropriate  Gastrointestinal:  Soft, Non-tender, + BS	  subcostal driveline incision bandage w/ dried blood, no active bleed visible  Skin: No rashes, No ecchymoses, No cyanosis	  Neurologic: Non-focal  Extremities: Normal range of motion, No clubbing, cyanosis or edema  Vascular: Peripheral pulses palpable 2+ bilaterally      LABS:	 	    CBC Full  -  ( 17 Sep 2017 01:28 )  WBC Count : 11.2 K/uL  Hemoglobin : 9.5 g/dL  Hematocrit : 29.0 %  Platelet Count - Automated : 113 K/uL  Mean Cell Volume : 98.7 fl  Mean Cell Hemoglobin : 32.3 pg  Mean Cell Hemoglobin Concentration : 32.7 gm/dL  Auto Neutrophil # : x  Auto Lymphocyte # : x  Auto Monocyte # : x  Auto Eosinophil # : x  Auto Basophil # : x  Auto Neutrophil % : x  Auto Lymphocyte % : x  Auto Monocyte % : x  Auto Eosinophil % : x  Auto Basophil % : x    09-17    130<L>  |  90<L>  |  17  ----------------------------<  113<H>  4.4   |  30  |  0.59  09-16    128<L>  |  89<L>  |  19  ----------------------------<  124<H>  4.7   |  27  |  0.57    Ca    9.0      17 Sep 2017 01:28  Ca    9.0      16 Sep 2017 02:05  Phos  2.8     09-17    TPro  6.3  /  Alb  3.1<L>  /  TBili  0.4  /  DBili  x   /  AST  36  /  ALT  32  /  AlkPhos  91  09-17  TPro  6.5  /  Alb  3.5  /  TBili  0.5  /  DBili  x   /  AST  54<H>  /  ALT  44  /  AlkPhos  94  09-16    TELEMETRY: sinus 100        	  ASSESSMENT/PLAN: 	    62 year old Creole speaking man who presented with abdominal pain and was found to be in low output heart failure with cardiogenic shock (EF 10%) underwent LVAD/TV annuloplasty for INTERMACS 2 9/12;   -postop had bleeding requiring PRBC, amicar, PLT, cryo. Inotropes titrated down. TTE done which showed LVEDD 5.9 cm, mild-mod MR, aortic valve closed, mild RV dysfunction, mild TR. Extubated. Doing well.     balance -2.8L,     - BP improved on hydral 10 mg q8h; goal MAP 70-80  - bumex continue 1 mg daily  - OOB to chair  - milrinone to 0.125; **likely d/c today  - hgb stable 9s  - LDH fairly stable; no power spikes  - thrombocytopenia negligible improvement; d/c PPI and check HIT Ab    [id number]

## 2017-09-17 NOTE — PROGRESS NOTE ADULT - SUBJECTIVE AND OBJECTIVE BOX
Patient is a 62y old  Male who presents with a chief complaint of SOOB (25 Aug 2017 22:27)  some abdominal pain  has "so-so" breathing    Any change in ROS:     MEDICATIONS  (STANDING):  milrinone Infusion 0.125 MICROgram(s)/kG/Min (2.25 mL/Hr) IV Continuous <Continuous>  aspirin enteric coated 81 milliGRAM(s) Oral daily  insulin lispro (HumaLOG) corrective regimen sliding scale   SubCutaneous three times a day before meals  insulin lispro (HumaLOG) corrective regimen sliding scale   SubCutaneous at bedtime  docusate sodium 100 milliGRAM(s) Oral three times a day  polyethylene glycol 3350 17 Gram(s) Oral daily  hydrALAZINE 10 milliGRAM(s) Oral every 8 hours  buMETAnide Injectable 1 milliGRAM(s) IV Push daily  sodium chloride 0.9%. 1000 milliLiter(s) (10 mL/Hr) IV Continuous <Continuous>  heparin  Infusion 1200 Unit(s)/Hr (9.5 mL/Hr) IV Continuous <Continuous>  pantoprazole    Tablet 40 milliGRAM(s) Oral before breakfast    MEDICATIONS  (PRN):  oxyCODONE    5 mG/acetaminophen 325 mG 1 Tablet(s) Oral every 4 hours PRN Moderate Pain (4 - 6)  oxyCODONE    5 mG/acetaminophen 325 mG 2 Tablet(s) Oral every 4 hours PRN Severe Pain (7 - 10)    Vital Signs Last 24 Hrs  T(C): 36.4 (17 Sep 2017 08:00), Max: 37.2 (16 Sep 2017 18:00)  T(F): 97.6 (17 Sep 2017 08:00), Max: 99 (16 Sep 2017 18:00)  HR: 96 (17 Sep 2017 08:00) (96 - 107)  BP: --  BP(mean): --  RR: 28 (17 Sep 2017 08:00) (13 - 33)  SpO2: 100% (17 Sep 2017 08:00) (96% - 100%)    I&O's Summary    16 Sep 2017 07:01  -  17 Sep 2017 07:00  --------------------------------------------------------  IN: 225.4 mL / OUT: 3060 mL / NET: -2834.6 mL    17 Sep 2017 07:01  -  17 Sep 2017 09:20  --------------------------------------------------------  IN: 2.3 mL / OUT: 410 mL / NET: -407.7 mL          Physical Exam:   GENERAL: NAD, well-groomed, well-developed  HEENT: ALONSO/   Atraumatic, Normocephalic  ENMT: No tonsillar erythema, exudates, or enlargement; Moist mucous membranes, Good dentition, No lesions  NECK: Supple, No JVD, Normal thyroid  CHEST/LUNG: no wheezing  CVS: Regular rate and rhythm; No murmurs, rubs, or gallops  GI: : Soft, Nontender, Nondistended; Bowel sounds present  NERVOUS SYSTEM:  Alert & Oriented X3  EXTREMITIES:  2+ Peripheral Pulses, No clubbing, cyanosis, or edema  LYMPH: No lymphadenopathy noted  SKIN: No rashes or lesions  ENDOCRINOLOGY: No Thyromegaly  PSYCH: Appropriate    Labs:  ABG - ( 17 Sep 2017 05:24 )  pH: 7.42  /  pCO2: 48    /  pO2: 135   / HCO3: 31    / Base Excess: 6.3   /  SaO2: 100             28, 21, 36, 40, 40, 40, 40, 40, 40, 40, 40, 19, 100, 0, 0                            9.5    11.2  )-----------( 113      ( 17 Sep 2017 01:28 )             29.0                         9.1    13.0  )-----------( 102      ( 16 Sep 2017 02:05 )             26.2                         9.8    16.7  )-----------( 109      ( 15 Sep 2017 01:41 )             27.7                         10.0   17.4  )-----------( 121      ( 14 Sep 2017 15:30 )             29.4                         4.6    9.2   )-----------( 2        ( 14 Sep 2017 09:09 )             13.1                         8.9    18.8  )-----------( 130      ( 14 Sep 2017 08:46 )             26.0                         9.2    17.3  )-----------( 139      ( 14 Sep 2017 01:06 )             26.3         130<L>  |  90<L>  |  17  ----------------------------<  113<H>  4.4   |  30  |  0.59      128<L>  |  89<L>  |  19  ----------------------------<  124<H>  4.7   |  27  |  0.57  09-15    129<L>  |  91<L>  |  22  ----------------------------<  139<H>  4.7   |  25  |  0.65      133<L>  |  93<L>  |  25<H>  ----------------------------<  139<H>  4.7   |  26  |  0.72      130<L>  |  95<L>  |  23  ----------------------------<  185<H>  5.1   |  20<L>  |  0.76    Ca    9.0      17 Sep 2017 01:28  Ca    9.0      16 Sep 2017 02:05  Phos  2.8         TPro  6.3  /  Alb  3.1<L>  /  TBili  0.4  /  DBili  x   /  AST  36  /  ALT  32  /  AlkPhos  91    TPro  6.5  /  Alb  3.5  /  TBili  0.5  /  DBili  x   /  AST  54<H>  /  ALT  44  /  AlkPhos  94    TPro  7.0  /  Alb  4.4  /  TBili  0.6  /  DBili  x   /  AST  78<H>  /  ALT  43  /  AlkPhos  64  09-15  TPro  7.1  /  Alb  4.0  /  TBili  0.6  /  DBili  x   /  AST  99<H>  /  ALT  46<H>  /  AlkPhos  63  09-14  TPro  6.5  /  Alb  3.8  /  TBili  0.5  /  DBili  x   /  AST  117<H>  /  ALT  46<H>  /  AlkPhos  57      CAPILLARY BLOOD GLUCOSE  102 (17 Sep 2017 08:00)  148 (16 Sep 2017 22:00)          LIVER FUNCTIONS - ( 17 Sep 2017 01:28 )  Alb: 3.1 g/dL / Pro: 6.3 g/dL / ALK PHOS: 91 U/L / ALT: 32 U/L RC / AST: 36 U/L / GGT: x           PT/INR - ( 16 Sep 2017 14:55 )   PT: 12.7 sec;   INR: 1.17 ratio         PTT - ( 17 Sep 2017 05:25 )  PTT:86.2 sec  Urinalysis Basic - ( 15 Sep 2017 11:47 )    Color: Yellow / Appearance: Clear / S.020 / pH: x  Gluc: x / Ketone: Negative  / Bili: Negative / Urobili: Negative   Blood: x / Protein: Trace / Nitrite: Negative   Leuk Esterase: Negative / RBC: >50 /HPF / WBC 5-10 /HPF   Sq Epi: x / Non Sq Epi: x / Bacteria: x      Cultures:         < from: Xray Chest 1 View AP/PA (09.15.17 @ 03:39) >  EXAM:  CHEST SINGLE AP OR PA                            PROCEDURE DATE:  09/15/2017            INTERPRETATION:  A single chest x-ray was obtained on 9/15/2017.    Indication: Status post cardiac surgery.    Impression:    The heart is enlarged. Left lower lobe pneumonia and/or atelectasis. The   right lung is clear. Mediastinal tubes are in place. A Cordis sheet   catheter seen on the right and tip is in superior vena cava. Left chest   tubes in place as well. No pneumothorax. A left ventricular assisted   device is in good position. Status post sternotomy. No changes in the   appearance of the chest when compared to previous study done 2017.        < from: CT Angio Chest w/ IV Cont (17 @ 21:07) >      < end of copied text >              ANIRUDH HUMPHREYS M.D., ATTENDING RADIOLOGIST  This document has been electronically signed. Sep 15 2017  8:29AM        < end of copied text >                      Studies  Chest X-RAY  CT SCAN Chest   Venous Dopplers: LE:   CT Abdomen  Others

## 2017-09-18 DIAGNOSIS — Z79.01 LONG TERM (CURRENT) USE OF ANTICOAGULANTS: ICD-10-CM

## 2017-09-18 LAB
ALBUMIN SERPL ELPH-MCNC: 2.8 G/DL — LOW (ref 3.3–5)
ALP SERPL-CCNC: 85 U/L — SIGNIFICANT CHANGE UP (ref 40–120)
ALT FLD-CCNC: 27 U/L RC — SIGNIFICANT CHANGE UP (ref 10–45)
AMYLASE P1 CFR SERPL: 56 U/L — SIGNIFICANT CHANGE UP (ref 25–125)
ANION GAP SERPL CALC-SCNC: 8 MMOL/L — SIGNIFICANT CHANGE UP (ref 5–17)
APTT BLD: 29.5 SEC — SIGNIFICANT CHANGE UP (ref 27.5–37.4)
AST SERPL-CCNC: 25 U/L — SIGNIFICANT CHANGE UP (ref 10–40)
BILIRUB SERPL-MCNC: 0.4 MG/DL — SIGNIFICANT CHANGE UP (ref 0.2–1.2)
BUN SERPL-MCNC: 17 MG/DL — SIGNIFICANT CHANGE UP (ref 7–23)
CALCIUM SERPL-MCNC: 8.6 MG/DL — SIGNIFICANT CHANGE UP (ref 8.4–10.5)
CHLORIDE SERPL-SCNC: 91 MMOL/L — LOW (ref 96–108)
CO2 SERPL-SCNC: 27 MMOL/L — SIGNIFICANT CHANGE UP (ref 22–31)
CREAT SERPL-MCNC: 0.59 MG/DL — SIGNIFICANT CHANGE UP (ref 0.5–1.3)
GLUCOSE SERPL-MCNC: 158 MG/DL — HIGH (ref 70–99)
HCT VFR BLD CALC: 25.9 % — LOW (ref 39–50)
HGB BLD-MCNC: 9 G/DL — LOW (ref 13–17)
INR BLD: 1.99 RATIO — HIGH (ref 0.88–1.16)
INR BLD: 2.07 RATIO — HIGH (ref 0.88–1.16)
LDH SERPL L TO P-CCNC: 419 U/L — HIGH (ref 50–242)
LIDOCAIN IGE QN: 24 U/L — SIGNIFICANT CHANGE UP (ref 7–60)
MCHC RBC-ENTMCNC: 34.1 PG — HIGH (ref 27–34)
MCHC RBC-ENTMCNC: 34.9 GM/DL — SIGNIFICANT CHANGE UP (ref 32–36)
MCV RBC AUTO: 97.7 FL — SIGNIFICANT CHANGE UP (ref 80–100)
PF4 HEPARIN CMPLX AB SER-ACNC: NEGATIVE — SIGNIFICANT CHANGE UP
PF4 HEPARIN CMPLX AB SERPL QL IA: 0.2 ABS — SIGNIFICANT CHANGE UP
PHOSPHATE SERPL-MCNC: 2.5 MG/DL — SIGNIFICANT CHANGE UP (ref 2.5–4.5)
PLATELET # BLD AUTO: 123 K/UL — LOW (ref 150–400)
POTASSIUM SERPL-MCNC: 4.4 MMOL/L — SIGNIFICANT CHANGE UP (ref 3.5–5.3)
POTASSIUM SERPL-SCNC: 4.4 MMOL/L — SIGNIFICANT CHANGE UP (ref 3.5–5.3)
PROT SERPL-MCNC: 6 G/DL — SIGNIFICANT CHANGE UP (ref 6–8.3)
PROTHROM AB SERPL-ACNC: 21.8 SEC — HIGH (ref 9.8–12.7)
PROTHROM AB SERPL-ACNC: 22.9 SEC — HIGH (ref 9.8–12.7)
RBC # BLD: 2.65 M/UL — LOW (ref 4.2–5.8)
RBC # FLD: 13.7 % — SIGNIFICANT CHANGE UP (ref 10.3–14.5)
SODIUM SERPL-SCNC: 126 MMOL/L — LOW (ref 135–145)
WBC # BLD: 12.9 K/UL — HIGH (ref 3.8–10.5)
WBC # FLD AUTO: 12.9 K/UL — HIGH (ref 3.8–10.5)

## 2017-09-18 PROCEDURE — 71010: CPT | Mod: 26,76

## 2017-09-18 PROCEDURE — 99232 SBSQ HOSP IP/OBS MODERATE 35: CPT

## 2017-09-18 PROCEDURE — 93750 INTERROGATION VAD IN PERSON: CPT

## 2017-09-18 PROCEDURE — 99233 SBSQ HOSP IP/OBS HIGH 50: CPT | Mod: 25

## 2017-09-18 RX ORDER — FENTANYL CITRATE 50 UG/ML
50 INJECTION INTRAVENOUS ONCE
Qty: 0 | Refills: 0 | Status: DISCONTINUED | OUTPATIENT
Start: 2017-09-18 | End: 2017-09-18

## 2017-09-18 RX ORDER — WARFARIN SODIUM 2.5 MG/1
2.5 TABLET ORAL ONCE
Qty: 0 | Refills: 0 | Status: COMPLETED | OUTPATIENT
Start: 2017-09-18 | End: 2017-09-18

## 2017-09-18 RX ADMIN — OXYCODONE AND ACETAMINOPHEN 2 TABLET(S): 5; 325 TABLET ORAL at 04:45

## 2017-09-18 RX ADMIN — OXYCODONE AND ACETAMINOPHEN 2 TABLET(S): 5; 325 TABLET ORAL at 04:15

## 2017-09-18 RX ADMIN — FENTANYL CITRATE 50 MICROGRAM(S): 50 INJECTION INTRAVENOUS at 09:00

## 2017-09-18 RX ADMIN — OXYCODONE AND ACETAMINOPHEN 2 TABLET(S): 5; 325 TABLET ORAL at 00:30

## 2017-09-18 RX ADMIN — WARFARIN SODIUM 2.5 MILLIGRAM(S): 2.5 TABLET ORAL at 21:16

## 2017-09-18 RX ADMIN — Medication 100 MILLIGRAM(S): at 21:16

## 2017-09-18 RX ADMIN — Medication 10 MILLIGRAM(S): at 14:06

## 2017-09-18 RX ADMIN — OXYCODONE AND ACETAMINOPHEN 2 TABLET(S): 5; 325 TABLET ORAL at 11:29

## 2017-09-18 RX ADMIN — Medication 20 MILLIGRAM(S): at 05:54

## 2017-09-18 RX ADMIN — Medication 10 MILLIGRAM(S): at 05:54

## 2017-09-18 RX ADMIN — OXYCODONE AND ACETAMINOPHEN 2 TABLET(S): 5; 325 TABLET ORAL at 20:05

## 2017-09-18 RX ADMIN — OXYCODONE AND ACETAMINOPHEN 2 TABLET(S): 5; 325 TABLET ORAL at 08:00

## 2017-09-18 RX ADMIN — Medication 100 MILLIGRAM(S): at 11:29

## 2017-09-18 RX ADMIN — Medication 10 MILLIGRAM(S): at 21:16

## 2017-09-18 RX ADMIN — OXYCODONE AND ACETAMINOPHEN 2 TABLET(S): 5; 325 TABLET ORAL at 00:00

## 2017-09-18 RX ADMIN — OXYCODONE AND ACETAMINOPHEN 2 TABLET(S): 5; 325 TABLET ORAL at 08:30

## 2017-09-18 RX ADMIN — Medication 81 MILLIGRAM(S): at 11:29

## 2017-09-18 RX ADMIN — FENTANYL CITRATE 50 MICROGRAM(S): 50 INJECTION INTRAVENOUS at 08:40

## 2017-09-18 RX ADMIN — PANTOPRAZOLE SODIUM 40 MILLIGRAM(S): 20 TABLET, DELAYED RELEASE ORAL at 08:20

## 2017-09-18 RX ADMIN — OXYCODONE AND ACETAMINOPHEN 2 TABLET(S): 5; 325 TABLET ORAL at 12:05

## 2017-09-18 RX ADMIN — POLYETHYLENE GLYCOL 3350 17 GRAM(S): 17 POWDER, FOR SOLUTION ORAL at 11:29

## 2017-09-18 RX ADMIN — MILRINONE LACTATE 2.25 MICROGRAM(S)/KG/MIN: 1 INJECTION, SOLUTION INTRAVENOUS at 04:13

## 2017-09-18 RX ADMIN — LISINOPRIL 5 MILLIGRAM(S): 2.5 TABLET ORAL at 05:55

## 2017-09-18 RX ADMIN — Medication 100 MILLIGRAM(S): at 05:54

## 2017-09-18 RX ADMIN — OXYCODONE AND ACETAMINOPHEN 2 TABLET(S): 5; 325 TABLET ORAL at 20:35

## 2017-09-18 NOTE — PROGRESS NOTE ADULT - SUBJECTIVE AND OBJECTIVE BOX
Subjective: Complaints of pain in chest and difficulty moving as a result. No dyspnea and no fevers.     Medications:  milrinone Infusion 0.125 MICROgram(s)/kG/Min IV Continuous <Continuous>  aspirin enteric coated 81 milliGRAM(s) Oral daily  insulin lispro (HumaLOG) corrective regimen sliding scale   SubCutaneous three times a day before meals  insulin lispro (HumaLOG) corrective regimen sliding scale   SubCutaneous at bedtime  oxyCODONE    5 mG/acetaminophen 325 mG 1 Tablet(s) Oral every 4 hours PRN  oxyCODONE    5 mG/acetaminophen 325 mG 2 Tablet(s) Oral every 4 hours PRN  docusate sodium 100 milliGRAM(s) Oral three times a day  polyethylene glycol 3350 17 Gram(s) Oral daily  hydrALAZINE 10 milliGRAM(s) Oral every 8 hours  pantoprazole    Tablet 40 milliGRAM(s) Oral before breakfast  furosemide    Tablet 20 milliGRAM(s) Oral daily  lisinopril 5 milliGRAM(s) Oral daily      Physical Exam:    Vital Signs Last 24 Hrs  T(C): 36 (18 Sep 2017 07:00), Max: 37.2 (17 Sep 2017 19:00)  T(F): 96.8 (18 Sep 2017 07:00), Max: 98.9 (17 Sep 2017 19:00)  HR: 95 (18 Sep 2017 10:00) (95 - 116)  RR: 14 (18 Sep 2017 10:00) (14 - 32)  SpO2: 99% (18 Sep 2017 10:00) (91% - 99%)    Daily Weight in k.4 (18 Sep 2017 00:00)    I&O's Summary    17 Sep 2017 07:  -  18 Sep 2017 07:00  --------------------------------------------------------  IN: 131.2 mL / OUT: 1930 mL / NET: -1798.8 mL    18 Sep 2017 07:  -  18 Sep 2017 10:26  --------------------------------------------------------  IN: 120 mL / OUT: 350 mL / NET: -230 mL    General: No distress. Comfortable.  HEENT: EOM intact.  Neck: Neck supple. JVP not elevated. No masses  Chest: Clear to auscultation bilaterally  CV: Typical LVAD sounds. No distal pulses. No edema.   Abdomen: Soft, non-distended, non-tender  Driveline exit site: Clean, dry, and intact  Skin: No rashes or skin breakdown  Neurology: Alert and oriented times three. Sensation intact  Psych: Affect normal    LVAD Interrogation: Heart Mate II  RPMs: 9000  Power:  Flow:  PI:  Event: No events or alarms.   No programming changes were made    Labs:                        9.0    12.9  )-----------( 123      ( 18 Sep 2017 02:39 )             25.9         126<L>  |  91<L>  |  17  ----------------------------<  158<H>  4.4   |  27  |  0.59    Ca    8.6      18 Sep 2017 02:39  Phos  2.5         TPro  6.0  /  Alb  2.8<L>  /  TBili  0.4  /  DBili  x   /  AST  25  /  ALT  27  /  AlkPhos  85      PT/INR - ( 18 Sep 2017 02:39 )   PT: 21.8 sec;   INR: 1.99 ratio      PTT - ( 18 Sep 2017 02:39 )  PTT:29.5 sec    Lactate Dehydrogenase, Serum: 419 U/L ( @ 02:39)  Lactate Dehydrogenase, Serum: 469 U/L ( @ 01:28)  Lactate Dehydrogenase, Serum: 501 U/L ( @ 02:05)

## 2017-09-18 NOTE — PROVIDER CONTACT NOTE (OTHER) - ASSESSMENT
Pt has sleep apnea and desats briefly when sleeping.
no respiratory distress noted. placed on 3 LN NC while sleeping.
Asymptomatic. K 4.4. MAP 71.
BP = 91/64, no respiratory distress noted, NP at bedside, likely epigastric pain
BP = 94/64, asymptomatic.
CVP 25, flow 4.7, speed 9000, PI 5.6, power 5.0, pt received 10mg hydralazine at 1800
Denied any complaints of nausea vomiting or diarrhea at this time
EKG previously completed as well as morphine previously given
Pt A+O x4, pt currently denies chest pain and shortness of breath. Pt currently laying in bed.
VSS, pt complaining of chest pain, pt denying any other symptoms bedsides chest pain.
pt is a+ox4, pt denies chest pain, palpitations, pt states he is slightly sob, lung sounds clear b/l, bp 103/78, hr 106, oral temp 97.4, resps 18, pox 99% on ra

## 2017-09-18 NOTE — PROVIDER CONTACT NOTE (OTHER) - REASON
Abdominal pain
Chest Pain
Complaint of chest pain
Low BP
MAP 91-31
PAT for 1.6 seconds upto 176, back to sinus 90s
Vomit and Sleep apnea
ectopy
refusing BIPAP
refusing BIPAP, refusing to walk to obtain O2 sats, will reattempt tomorrow, c/o chest pain
pt had 9 beats of WCT

## 2017-09-18 NOTE — PROGRESS NOTE ADULT - SUBJECTIVE AND OBJECTIVE BOX
VITAL SIGNS    Telemetry:      Vital Signs Last 24 Hrs  T(C): 36.2 (17 @ 15:30), Max: 37.2 (17 @ 19:00)  T(F): 97.2 (17 @ 15:30), Max: 98.9 (17 @ 19:00)  HR: 95 (17 @ 15:30) (95 - 109)  BP: --  RR: 20 (17 @ 15:30) (14 - 31)  SpO2: 99% (17 @ 15:30) (91% - 100%)                    @ 07:01  -   @ 07:00  --------------------------------------------------------  IN: 131.2 mL / OUT: 1930 mL / NET: -1798.8 mL     @ 07:01  -   @ 16:42  --------------------------------------------------------  IN: 360 mL / OUT: 680 mL / NET: -320 mL          Daily     Daily Weight in k.4 (18 Sep 2017 00:00)        CAPILLARY BLOOD GLUCOSE  98 (18 Sep 2017 08:00)  101 (17 Sep 2017 22:00)                      Coumadin    [ ] YES          [  ]      NO         Reason:                               PHYSICAL EXAM        Neurology: alert and oriented x 3, nonfocal, no gross deficits  CV :  Sternal Wound :  CDI , Stable  Pacing Wires        [  ]   Settings:                                  Isolated  [  ]  Lungs:  Drains:     MS         [  ] Drainage:                 L Pleural  [  ]  Drainage:                R Pleural  [  ]  Drainage:  Abdomen: soft, nontender, nondistended, positive bowel sounds, last bowel movement   :             Extremities:               aspirin enteric coated 81 milliGRAM(s) Oral daily  oxyCODONE    5 mG/acetaminophen 325 mG 1 Tablet(s) Oral every 4 hours PRN  oxyCODONE    5 mG/acetaminophen 325 mG 2 Tablet(s) Oral every 4 hours PRN  docusate sodium 100 milliGRAM(s) Oral three times a day  polyethylene glycol 3350 17 Gram(s) Oral daily  hydrALAZINE 10 milliGRAM(s) Oral every 8 hours  pantoprazole    Tablet 40 milliGRAM(s) Oral before breakfast  furosemide    Tablet 20 milliGRAM(s) Oral daily  lisinopril 5 milliGRAM(s) Oral daily                    Physical Therapy Rec:   Home  [  ]   Home w/ PT  [  ]  Rehab  [  ]  Discussed with Cardiothoracic Team at AM rounds. im tired    VITAL SIGNS    Telemetry:   NSR 80    Vital Signs Last 24 Hrs  T(C): 36.2 (17 @ 15:30), Max: 37.2 (17 @ 19:00)  T(F): 97.2 (17 @ 15:30), Max: 98.9 (17 @ 19:00)  HR: 95 (17 @ 15:30) (95 - 109)  BP: -- map 70  RR: 20 (17 @ 15:30) (14 - 31)  SpO2: 99% (17 @ 15:30) (91% - 100%)                    @ 07:01  -   @ 07:00  --------------------------------------------------------  IN: 131.2 mL / OUT: 1930 mL / NET: -1798.8 mL     @ 07:01  -   @ 16:42  --------------------------------------------------------  IN: 360 mL / OUT: 680 mL / NET: -320 mL          Daily     Daily Weight in k.4 (18 Sep 2017 00:00)        CAPILLARY BLOOD GLUCOSE  98 (18 Sep 2017 08:00)  101 (17 Sep 2017 22:00)                      Coumadin    [ x] YES          [  ]      NO         Reason:                               PHYSICAL EXAM        Neurology: alert and oriented x 3, nonfocal, no gross deficits  CV : S1 S2 , RRR   Sternal Wound :  CDI , Stable    Lungs: bibasilar crackles  Drains:     MS         [  ] Drainage:                 L Pleural  [ x ]  Drainage:                R Pleural  [  ]  Drainage:  Abdomen: soft, nontender, nondistended, positive bowel sounds,  :        voiding   Extremities:      -   edema, negative calve tenderness,            aspirin enteric coated 81 milliGRAM(s) Oral daily  oxyCODONE    5 mG/acetaminophen 325 mG 1 Tablet(s) Oral every 4 hours PRN  oxyCODONE    5 mG/acetaminophen 325 mG 2 Tablet(s) Oral every 4 hours PRN  docusate sodium 100 milliGRAM(s) Oral three times a day  polyethylene glycol 3350 17 Gram(s) Oral daily  hydrALAZINE 10 milliGRAM(s) Oral every 8 hours  pantoprazole    Tablet 40 milliGRAM(s) Oral before breakfast  furosemide    Tablet 20 milliGRAM(s) Oral daily  lisinopril 5 milliGRAM(s) Oral daily                    Physical Therapy Rec:   Home  [  ]   Home w/ PT  [  ]  Rehab  [  ]  Discussed with Cardiothoracic Team at AM rounds.

## 2017-09-18 NOTE — PROVIDER CONTACT NOTE (OTHER) - BACKGROUND
8/25 SOB/Abd pain. Newly diagnosed CHF EF 10-15%/severe MR/TR. 9/12 LVAD/TV repair.
CP X1 week
Chest pain
LVAD 9/12/2017
Pt admitted with non radiating chest pain, echo performed today discovered a LV thrombus that is currently being treated with a heparin GTT. No significant PMH or PSH.
Pt admitted with non radiating chest pain, echo performed today discovered a LV thrombus that is currently being treated with a heparin GTT. No significant PMH or PSH.
admitted with chest pain
admitted with chest pain
pt admitted for Chest pain, SOB
hx; moises
Pt being tx for enteritis with IV ABX.

## 2017-09-18 NOTE — CHART NOTE - NSCHARTNOTEFT_GEN_A_CORE
Source: Patient [X ]    Family [ ]     other [X ] RN medical record    Pt seen, resting in chart. Pt reports being tired, and has limited interest in RD interview at this time. Pt reports a low PO intake over the weekend, and was not drinking ensure enlive. Pt ate 1 egg, 1 banana for breakfast, and requested 1/2 bottle of Ensure enlive over ice to drink. RD started education of coumadin and vitamin K drug interaction, Pt kept falling asleep during education. Pt aware RD remains available for HF and coumadin education review.     Per chart, Pt with acute systolic decompensated HF with EF of 10%, cardiogenic shock, and low output. S/p Heart Mate ll LVAD implant and TV repair on 9/12.    Diet : DASH, Ensure enlive x2       Patient reports no GI distress, last BM today.    PO intake:  75% of breakfast today     Source for PO intake [ X] Patient     Current Weight: 9/18: 60.4kg, 9/14: 67.8kg, admission Wt: 66.1kg. Wt trending back down, likely related to fluid shifts post op.      Pertinent Medications: MEDICATIONS  (STANDING):  milrinone Infusion 0.125 MICROgram(s)/kG/Min (2.25 mL/Hr) IV Continuous <Continuous>  aspirin enteric coated 81 milliGRAM(s) Oral daily  insulin lispro (HumaLOG) corrective regimen sliding scale   SubCutaneous three times a day before meals  insulin lispro (HumaLOG) corrective regimen sliding scale   SubCutaneous at bedtime  docusate sodium 100 milliGRAM(s) Oral three times a day  polyethylene glycol 3350 17 Gram(s) Oral daily  hydrALAZINE 10 milliGRAM(s) Oral every 8 hours  pantoprazole    Tablet 40 milliGRAM(s) Oral before breakfast  furosemide    Tablet 20 milliGRAM(s) Oral daily  lisinopril 5 milliGRAM(s) Oral daily    MEDICATIONS  (PRN):  oxyCODONE    5 mG/acetaminophen 325 mG 1 Tablet(s) Oral every 4 hours PRN Moderate Pain (4 - 6)  oxyCODONE    5 mG/acetaminophen 325 mG 2 Tablet(s) Oral every 4 hours PRN Severe Pain (7 - 10)  Coumadin; dosed daily     Pertinent Labs:  Hgb/Hct:9.0/25.9-low, Na:126-low, K:4.4, Cl:91-low, BUN:17, Cr:0.59, Glucose:158-high, Ca:.6, Total Protein:6.0, Albumin:2.8, Total Bilirubin:0.4,  AlkPhos:85, AST:25, ALT:27, amylase: 56, Lipase: 24, Phos; 2.5, BG level: 9/17: , 9/18: 98    Skin: Sternal wound, no pressure ulcers, No edema     Estimated Needs:   [ X] no change since previous assessment  [ ] recalculated:       Previous Nutrition Diagnosis:      [ X] Increased Nutrient Needs [X ] Malnutrition   Nutrition Diagnosis is [ X] ongoing, addressed with ensure enlive and PO intake encouragement       New Nutrition Diagnosis:      [ X] Food & Nutrition Related Knowledge Deficit     Related to: Limited exposure to nutrition related information      As evidenced by: Pt new to coumadin.       Recommend    1. Provide food preferences as requested by Pt/family within diet restrictions    2. Encourage PO intake during meal times   3. Follow up with HF and coumadin education review  4. Encourage PO intake of Ensure enlive to supplement PO Intake       Monitoring and Evaluation:     X] PO intake [X ] Tolerance to diet prescription [ X] weights [ X] follow up per protocol    [ X] other: RD remains available Sarah Siegler RD. Pager #[phone]

## 2017-09-18 NOTE — PROGRESS NOTE ADULT - PROBLEM SELECTOR PLAN 3
No need for heparin  Given acute increase in INR, would give 2 mg of coumadin again tonight  Continue aspirin 81 mg daily

## 2017-09-18 NOTE — PROVIDER CONTACT NOTE (OTHER) - DATE AND TIME:
29-Aug-2017 20:00
30-Aug-2017 23:32
13-Sep-2017 21:00
18-Sep-2017 19:31
26-Aug-2017 07:58
26-Aug-2017 13:17
26-Aug-2017 22:00
28-Aug-2017 21:19
28-Aug-2017 22:54
30-Aug-2017 02:30
31-Aug-2017 04:05

## 2017-09-18 NOTE — PROVIDER CONTACT NOTE (OTHER) - ACTION/TREATMENT ORDERED:
No orders at this time. Continue to monitor.
10mg hydralazine given as per CLAUDIA MCCARTY orders, no other intervention ordered will continue to monitor.
CHERELLE, Maalox 30cc po X 1
Con't present tx. Con't to monitor.
NP agreed with the above recommendations, will continue to monitor.
NP aware will continue to monitor.
NP notified and aware, BMP, MG and phos labs ordered, continue to monitor
Will come asses patient, continue to monitor patient
Will come to assess patient and consult with attending. Continue to monitor patient.
no new orders made
no new orders made, continue to monitor

## 2017-09-18 NOTE — PROGRESS NOTE ADULT - SUBJECTIVE AND OBJECTIVE BOX
Patient is a 62y old  Male who presents with a chief complaint of SOOB (25 Aug 2017 22:27)      SUBJECTIVE / OVERNIGHT EVENTS:  Seen in CT SDU.  S/p LVAD via median sternotomy, s/p Tricuspid repair with lee ring. CT in place.  Review of Systems:   CONSTITUTIONAL: No fever, weight loss, or fatigue  EYES: No eye pain, visual disturbances, or discharge  ENMT:  No difficulty hearing, tinnitus, vertigo; No sinus or throat pain  NECK: No pain or stiffness  BREASTS: No pain, masses, or nipple discharge  RESPIRATORY: No cough, wheezing, chills or hemoptysis; No shortness of breath  CARDIOVASCULAR: No chest pain, palpitations, dizziness, or leg swelling  GASTROINTESTINAL: No abdominal or epigastric pain. No nausea, vomiting, or hematemesis; No diarrhea or constipation. No melena or hematochezia.  GENITOURINARY: No dysuria, frequency, hematuria, or incontinence  NEUROLOGICAL: No headaches, memory loss, loss of strength, numbness, or tremors  SKIN: No itching, burning, rashes, or lesions   LYMPH NODES: No enlarged glands  ENDOCRINE: No heat or cold intolerance; No hair loss  MUSCULOSKELETAL: No joint pain or swelling; No muscle, back, or extremity pain  PSYCHIATRIC: No depression, anxiety, mood swings, or difficulty sleeping  HEME/LYMPH: No easy bruising, or bleeding gums  ALLERY AND IMMUNOLOGIC: No hives or eczema    MEDICATIONS  (STANDING):  aspirin enteric coated 81 milliGRAM(s) Oral daily  docusate sodium 100 milliGRAM(s) Oral three times a day  polyethylene glycol 3350 17 Gram(s) Oral daily  hydrALAZINE 10 milliGRAM(s) Oral every 8 hours  pantoprazole    Tablet 40 milliGRAM(s) Oral before breakfast  furosemide    Tablet 20 milliGRAM(s) Oral daily  lisinopril 5 milliGRAM(s) Oral daily    MEDICATIONS  (PRN):  oxyCODONE    5 mG/acetaminophen 325 mG 1 Tablet(s) Oral every 4 hours PRN Moderate Pain (4 - 6)  oxyCODONE    5 mG/acetaminophen 325 mG 2 Tablet(s) Oral every 4 hours PRN Severe Pain (7 - 10)      PHYSICAL EXAM:  Vital Signs Last 24 Hrs  T(C): 36.7 (18 Sep 2017 19:30), Max: 37.1 (17 Sep 2017 23:00)  T(F): 98 (18 Sep 2017 19:30), Max: 98.7 (17 Sep 2017 23:00)  HR: 95 (18 Sep 2017 19:30) (95 - 108)  BP: --  BP(mean): --  RR: 19 (18 Sep 2017 19:30) (14 - 27)  SpO2: 95% (18 Sep 2017 19:30) (95% - 100%)  I&O's Summary    17 Sep 2017 07:01  -  18 Sep 2017 07:00  --------------------------------------------------------  IN: 131.2 mL / OUT: 1930 mL / NET: -1798.8 mL    18 Sep 2017 07:01  -  18 Sep 2017 21:43  --------------------------------------------------------  IN: 480 mL / OUT: 990 mL / NET: -510 mL      GENERAL: NAD, well-developed  HEAD:  Atraumatic, Normocephalic  EYES: EOMI, PERRLA, conjunctiva and sclera clear  NECK: Supple, No JVD  CHEST/LUNG: Poor AE wilfrid, median sternotomy +, CT in place  HEART: Regular rate and rhythm; No murmurs, rubs, or gallops  ABDOMEN: Soft, Nontender, Nondistended; Bowel sounds present  EXTREMITIES:  2+ Peripheral Pulses, No clubbing, cyanosis, or edema  PSYCH: AAOx3  NEUROLOGY: non-focal  SKIN: No rashes or lesions    LABS:  CAPILLARY BLOOD GLUCOSE  98 (18 Sep 2017 08:00)  101 (17 Sep 2017 22:00)                              9.0    12.9  )-----------( 123      ( 18 Sep 2017 02:39 )             25.9     09-18    126<L>  |  91<L>  |  17  ----------------------------<  158<H>  4.4   |  27  |  0.59    Ca    8.6      18 Sep 2017 02:39  Phos  2.5     09-18    TPro  6.0  /  Alb  2.8<L>  /  TBili  0.4  /  DBili  x   /  AST  25  /  ALT  27  /  AlkPhos  85  09-18    PT/INR - ( 18 Sep 2017 14:25 )   PT: 22.9 sec;   INR: 2.07 ratio         PTT - ( 18 Sep 2017 02:39 )  PTT:29.5 sec          RADIOLOGY & ADDITIONAL TESTS:    Imaging Personally Reviewed:    Consultant(s) Notes Reviewed:      Care Discussed with Consultants/Other Providers:

## 2017-09-18 NOTE — PROGRESS NOTE ADULT - ASSESSMENT
Mr. Quispe is a 62 year old man with a nonischemic cardiomyopathy with ACC/AHA stage D congestive heart failure who presented with abdominal pain due to cardiogenic shock, now s/p Heart Mate II LVAD with TV annuloplasty ring on 9/12. His postoperative course was complicated by bleeding requiring transfusions. He currently is euvolemic without evidence of right heart failure. His INR is slightly subtherapeutic and his MAPs are at goal.    He can be transferred to step down unit.

## 2017-09-19 DIAGNOSIS — I50.22 CHRONIC SYSTOLIC (CONGESTIVE) HEART FAILURE: ICD-10-CM

## 2017-09-19 LAB
ALBUMIN SERPL ELPH-MCNC: 2.9 G/DL — LOW (ref 3.3–5)
ALP SERPL-CCNC: 86 U/L — SIGNIFICANT CHANGE UP (ref 40–120)
ALT FLD-CCNC: 22 U/L — SIGNIFICANT CHANGE UP (ref 10–45)
ANION GAP SERPL CALC-SCNC: 14 MMOL/L — SIGNIFICANT CHANGE UP (ref 5–17)
AST SERPL-CCNC: 32 U/L — SIGNIFICANT CHANGE UP (ref 10–40)
BASOPHILS # BLD AUTO: 0 K/UL — SIGNIFICANT CHANGE UP (ref 0–0.2)
BASOPHILS NFR BLD AUTO: 0 % — SIGNIFICANT CHANGE UP (ref 0–2)
BILIRUB SERPL-MCNC: 0.4 MG/DL — SIGNIFICANT CHANGE UP (ref 0.2–1.2)
BUN SERPL-MCNC: 13 MG/DL — SIGNIFICANT CHANGE UP (ref 7–23)
CALCIUM SERPL-MCNC: 8.7 MG/DL — SIGNIFICANT CHANGE UP (ref 8.4–10.5)
CHLORIDE SERPL-SCNC: 90 MMOL/L — LOW (ref 96–108)
CO2 SERPL-SCNC: 23 MMOL/L — SIGNIFICANT CHANGE UP (ref 22–31)
CREAT SERPL-MCNC: 0.46 MG/DL — LOW (ref 0.5–1.3)
EOSINOPHIL # BLD AUTO: 0.3 K/UL — SIGNIFICANT CHANGE UP (ref 0–0.5)
EOSINOPHIL NFR BLD AUTO: 1.9 % — SIGNIFICANT CHANGE UP (ref 0–6)
GLUCOSE SERPL-MCNC: 101 MG/DL — HIGH (ref 70–99)
HCT VFR BLD CALC: 26.3 % — LOW (ref 39–50)
HGB BLD-MCNC: 8.8 G/DL — LOW (ref 13–17)
INR BLD: 2.09 RATIO — HIGH (ref 0.88–1.16)
LDH SERPL L TO P-CCNC: 401 U/L — HIGH (ref 50–242)
LYMPHOCYTES # BLD AUTO: 1.4 K/UL — SIGNIFICANT CHANGE UP (ref 1–3.3)
LYMPHOCYTES # BLD AUTO: 9.7 % — LOW (ref 13–44)
MCHC RBC-ENTMCNC: 32.8 PG — SIGNIFICANT CHANGE UP (ref 27–34)
MCHC RBC-ENTMCNC: 33.5 GM/DL — SIGNIFICANT CHANGE UP (ref 32–36)
MCV RBC AUTO: 97.9 FL — SIGNIFICANT CHANGE UP (ref 80–100)
MONOCYTES # BLD AUTO: 1.6 K/UL — HIGH (ref 0–0.9)
MONOCYTES NFR BLD AUTO: 11.3 % — SIGNIFICANT CHANGE UP (ref 2–14)
NEUTROPHILS # BLD AUTO: 10.9 K/UL — HIGH (ref 1.8–7.4)
NEUTROPHILS NFR BLD AUTO: 77.1 % — HIGH (ref 43–77)
PLAT MORPH BLD: NORMAL — SIGNIFICANT CHANGE UP
PLATELET # BLD AUTO: 162 K/UL — SIGNIFICANT CHANGE UP (ref 150–400)
POTASSIUM SERPL-MCNC: 4.7 MMOL/L — SIGNIFICANT CHANGE UP (ref 3.5–5.3)
POTASSIUM SERPL-SCNC: 4.7 MMOL/L — SIGNIFICANT CHANGE UP (ref 3.5–5.3)
PROT SERPL-MCNC: 6.3 G/DL — SIGNIFICANT CHANGE UP (ref 6–8.3)
PROTHROM AB SERPL-ACNC: 22.9 SEC — HIGH (ref 9.8–12.7)
RBC # BLD: 2.69 M/UL — LOW (ref 4.2–5.8)
RBC # FLD: 13.7 % — SIGNIFICANT CHANGE UP (ref 10.3–14.5)
RBC BLD AUTO: SIGNIFICANT CHANGE UP
SODIUM SERPL-SCNC: 127 MMOL/L — LOW (ref 135–145)
WBC # BLD: 14.1 K/UL — HIGH (ref 3.8–10.5)
WBC # FLD AUTO: 14.1 K/UL — HIGH (ref 3.8–10.5)

## 2017-09-19 PROCEDURE — 71010: CPT | Mod: 26

## 2017-09-19 PROCEDURE — 93750 INTERROGATION VAD IN PERSON: CPT

## 2017-09-19 PROCEDURE — 99233 SBSQ HOSP IP/OBS HIGH 50: CPT | Mod: 25

## 2017-09-19 RX ORDER — HYDROMORPHONE HYDROCHLORIDE 2 MG/ML
4 INJECTION INTRAMUSCULAR; INTRAVENOUS; SUBCUTANEOUS
Qty: 0 | Refills: 0 | Status: DISCONTINUED | OUTPATIENT
Start: 2017-09-19 | End: 2017-09-25

## 2017-09-19 RX ORDER — HYDROMORPHONE HYDROCHLORIDE 2 MG/ML
2 INJECTION INTRAMUSCULAR; INTRAVENOUS; SUBCUTANEOUS
Qty: 0 | Refills: 0 | Status: DISCONTINUED | OUTPATIENT
Start: 2017-09-19 | End: 2017-09-25

## 2017-09-19 RX ORDER — WARFARIN SODIUM 2.5 MG/1
2.5 TABLET ORAL ONCE
Qty: 0 | Refills: 0 | Status: COMPLETED | OUTPATIENT
Start: 2017-09-19 | End: 2017-09-19

## 2017-09-19 RX ORDER — OXYCODONE AND ACETAMINOPHEN 5; 325 MG/1; MG/1
2 TABLET ORAL ONCE
Qty: 0 | Refills: 0 | Status: DISCONTINUED | OUTPATIENT
Start: 2017-09-19 | End: 2017-09-19

## 2017-09-19 RX ADMIN — OXYCODONE AND ACETAMINOPHEN 2 TABLET(S): 5; 325 TABLET ORAL at 03:18

## 2017-09-19 RX ADMIN — LISINOPRIL 5 MILLIGRAM(S): 2.5 TABLET ORAL at 06:28

## 2017-09-19 RX ADMIN — HYDROMORPHONE HYDROCHLORIDE 4 MILLIGRAM(S): 2 INJECTION INTRAMUSCULAR; INTRAVENOUS; SUBCUTANEOUS at 17:26

## 2017-09-19 RX ADMIN — Medication 10 MILLIGRAM(S): at 06:28

## 2017-09-19 RX ADMIN — OXYCODONE AND ACETAMINOPHEN 2 TABLET(S): 5; 325 TABLET ORAL at 07:47

## 2017-09-19 RX ADMIN — Medication 20 MILLIGRAM(S): at 06:28

## 2017-09-19 RX ADMIN — OXYCODONE AND ACETAMINOPHEN 2 TABLET(S): 5; 325 TABLET ORAL at 12:53

## 2017-09-19 RX ADMIN — Medication 100 MILLIGRAM(S): at 06:28

## 2017-09-19 RX ADMIN — OXYCODONE AND ACETAMINOPHEN 2 TABLET(S): 5; 325 TABLET ORAL at 03:50

## 2017-09-19 RX ADMIN — OXYCODONE AND ACETAMINOPHEN 2 TABLET(S): 5; 325 TABLET ORAL at 00:35

## 2017-09-19 RX ADMIN — HYDROMORPHONE HYDROCHLORIDE 4 MILLIGRAM(S): 2 INJECTION INTRAMUSCULAR; INTRAVENOUS; SUBCUTANEOUS at 20:02

## 2017-09-19 RX ADMIN — WARFARIN SODIUM 2.5 MILLIGRAM(S): 2.5 TABLET ORAL at 21:49

## 2017-09-19 RX ADMIN — OXYCODONE AND ACETAMINOPHEN 2 TABLET(S): 5; 325 TABLET ORAL at 13:23

## 2017-09-19 RX ADMIN — HYDROMORPHONE HYDROCHLORIDE 4 MILLIGRAM(S): 2 INJECTION INTRAMUSCULAR; INTRAVENOUS; SUBCUTANEOUS at 16:56

## 2017-09-19 RX ADMIN — Medication 100 MILLIGRAM(S): at 21:49

## 2017-09-19 RX ADMIN — OXYCODONE AND ACETAMINOPHEN 2 TABLET(S): 5; 325 TABLET ORAL at 08:17

## 2017-09-19 RX ADMIN — OXYCODONE AND ACETAMINOPHEN 2 TABLET(S): 5; 325 TABLET ORAL at 00:04

## 2017-09-19 RX ADMIN — PANTOPRAZOLE SODIUM 40 MILLIGRAM(S): 20 TABLET, DELAYED RELEASE ORAL at 06:29

## 2017-09-19 RX ADMIN — HYDROMORPHONE HYDROCHLORIDE 4 MILLIGRAM(S): 2 INJECTION INTRAMUSCULAR; INTRAVENOUS; SUBCUTANEOUS at 19:32

## 2017-09-19 RX ADMIN — HYDROMORPHONE HYDROCHLORIDE 4 MILLIGRAM(S): 2 INJECTION INTRAMUSCULAR; INTRAVENOUS; SUBCUTANEOUS at 23:57

## 2017-09-19 NOTE — PROGRESS NOTE ADULT - ASSESSMENT
61 yo male with no PMHx presented with sob and abdominal pain  on 8/25/17.  Cta - for PE,  cardiac mRI and cath revealed   lv dysfunction and MR.  Cardiology was consulted, HF service consulted and the pt was placed in ccu with pulmonary edema. He had  low cardiac output and cardiogenic shock, and was  referred  for LVAD evaluation. ID was consulted to clear the patient for surgery.. GI was following for abd pain, no significant findings on virtual colonoscopy. He was treated for chf  with  afterload reduction  and milrinone and stabilized, euvolemic .  9/12/17 S/P Implantation of HeartMate II left ventricular assist device through median sternotomy, and repair of the tricuspid valve with a number 28 classic Christianson ring.  Post op he required inotropic support and PRBc's for acute blood loss anemia  He was transferred to sdu  9/18  He has c/o pain at the incision site and chest tube site  Chest tube to be d/c

## 2017-09-19 NOTE — PROGRESS NOTE ADULT - SUBJECTIVE AND OBJECTIVE BOX
Subjective: Complaint of ongoing sternal chest pain. No shortness of breath. Sitting in chair. No other complaints.     Medications:  aspirin enteric coated 81 milliGRAM(s) Oral daily  oxyCODONE    5 mG/acetaminophen 325 mG 1 Tablet(s) Oral every 4 hours PRN  oxyCODONE    5 mG/acetaminophen 325 mG 2 Tablet(s) Oral every 4 hours PRN  docusate sodium 100 milliGRAM(s) Oral three times a day  polyethylene glycol 3350 17 Gram(s) Oral daily  hydrALAZINE 10 milliGRAM(s) Oral every 8 hours  pantoprazole    Tablet 40 milliGRAM(s) Oral before breakfast  furosemide    Tablet 20 milliGRAM(s) Oral daily  lisinopril 5 milliGRAM(s) Oral daily      Physical Exam:    Vital Signs Last 24 Hrs  T(C): 36.3 (19 Sep 2017 07:36), Max: 36.9 (18 Sep 2017 23:50)  T(F): 97.3 (19 Sep 2017 07:36), Max: 98.4 (18 Sep 2017 23:50)  HR: 92 (19 Sep 2017 07:36) (92 - 102)  RR: 18 (19 Sep 2017 07:36) (14 - 27)  SpO2: 96% (19 Sep 2017 07:36) (95% - 100%)  Daily     I&O's Summary    18 Sep 2017 07:01  -  19 Sep 2017 07:00  --------------------------------------------------------  IN: 660 mL / OUT: 1450 mL / NET: -790 mL    General: No distress. Comfortable.  HEENT: EOM intact.  Neck: Neck supple. JVP not elevated. No masses  Chest: Clear to auscultation bilaterally  CV: Typical LVAD sounds. No distal pulses  Abdomen: Soft, non-distended, non-tender  Driveline exit site: Clean, dry, and intact  Skin: No rashes or skin breakdown  Neurology: Alert and oriented times three. Sensation intact  Psych: Affect normal    LVAD Interrogation: Heart Mate II  RPMs: 9000  Power: 4.8  Flow: 4.2  PI: 5.4  Event: None  No programming changes were made    Labs:                        8.8    14.1  )-----------( 162      ( 19 Sep 2017 05:09 )             26.3     09-18    126<L>  |  91<L>  |  17  ----------------------------<  158<H>  4.4   |  27  |  0.59    Ca    8.6      18 Sep 2017 02:39  Phos  2.5     09-18    TPro  6.0  /  Alb  2.8<L>  /  TBili  0.4  /  DBili  x   /  AST  25  /  ALT  27  /  AlkPhos  85  09-18    PT/INR - ( 18 Sep 2017 14:25 )   PT: 22.9 sec;   INR: 2.07 ratio      PTT - ( 18 Sep 2017 02:39 )  PTT:29.5 sec    Lactate Dehydrogenase, Serum: 419 U/L (09-18 @ 02:39)  Lactate Dehydrogenase, Serum: 469 U/L (09-17 @ 01:28)

## 2017-09-19 NOTE — PROGRESS NOTE ADULT - SUBJECTIVE AND OBJECTIVE BOX
i have pain    VITAL SIGNS    Telemetry:  nsr 90    Vital Signs Last 24 Hrs  T(C): 36.3 (09-19-17 @ 07:36), Max: 36.9 (09-18-17 @ 23:50)  T(F): 97.3 (09-19-17 @ 07:36), Max: 98.4 (09-18-17 @ 23:50)  HR: 92 (09-19-17 @ 07:36) (92 - 99)  BP: --  70's  RR: 18 (09-19-17 @ 07:36) (18 - 27)  SpO2: 96% (09-19-17 @ 07:36) (95% - 100%)                   09-18 @ 07:01  -  09-19 @ 07:00  --------------------------------------------------------  IN: 660 mL / OUT: 1450 mL / NET: -790 mL          Daily     Daily         CAPILLARY BLOOD GLUCOSE                      Coumadin    [ x YES          [  ]      NO         Reason:                               PHYSICAL EXAM        Neurology: alert and oriented x 3, nonfocal, no gross deficits  CV :  S1 S2 , RRR   Sternal Wound :  CDI , Stable    Lungs: bibasilar crackles  Drains:     MS         [  ] Drainage:                 L Pleural  [ x ]  Drainage:     10/50             Abdomen: soft, nontender, nondistended, positive bowel sounds,   :           voiding  Extremities:       -  edema, + PEDAL EDEMA  negative calve tenderness,   leg incision cdi           aspirin enteric coated 81 milliGRAM(s) Oral daily  oxyCODONE    5 mG/acetaminophen 325 mG 1 Tablet(s) Oral every 4 hours PRN  oxyCODONE    5 mG/acetaminophen 325 mG 2 Tablet(s) Oral every 4 hours PRN  docusate sodium 100 milliGRAM(s) Oral three times a day  polyethylene glycol 3350 17 Gram(s) Oral daily  pantoprazole    Tablet 40 milliGRAM(s) Oral before breakfast  furosemide    Tablet 20 milliGRAM(s) Oral daily  lisinopril 5 milliGRAM(s) Oral daily                    Physical Therapy Rec:   Home  [  ]   Home w/ PT  [  ]  Rehab  [  ]  Discussed with Cardiothoracic Team at AM rounds.

## 2017-09-19 NOTE — PROGRESS NOTE ADULT - PROBLEM SELECTOR PLAN 1
Resolved. D/C milrinone as per CT Surgery. his blood pressure is low, will follow with cardiology and cardiovascular thoracic surgery.

## 2017-09-19 NOTE — PROGRESS NOTE ADULT - PROBLEM SELECTOR PLAN 1
Anticoagulation  F/u ldh  Ambulate  D/c chest tube when the drainage decreases.  Off milrinone  prn diuretics, decreased for now

## 2017-09-19 NOTE — PROGRESS NOTE ADULT - SUBJECTIVE AND OBJECTIVE BOX
Patient is a 62y old  Male who presents with a chief complaint of SOOB (25 Aug 2017 22:27)      SUBJECTIVE / OVERNIGHT EVENTS:  no shortness of breath at rest.  He has been seen by heart failure team.  Review of Systems:   CONSTITUTIONAL: No fever, weight loss,   EYES: No eye pain, visual disturbances, or discharge  ENMT:  No difficulty hearing, tinnitus, vertigo; No sinus or throat pain  NECK: No pain or stiffness  BREASTS: No pain, masses, or nipple discharge  RESPIRATORY: No cough, wheezing, chills or hemoptysis; No shortness of breath  CARDIOVASCULAR: No chest pain, palpitations, dizziness, or leg swelling  GASTROINTESTINAL: No abdominal or epigastric pain. No nausea, vomiting, or hematemesis; No diarrhea or constipation. No melena or hematochezia.  GENITOURINARY: No dysuria, frequency, hematuria, or incontinence  NEUROLOGICAL: No headaches, memory loss, loss of strength, numbness, or tremors  SKIN: No itching, burning, rashes, or lesions   LYMPH NODES: No enlarged glands  ENDOCRINE: No heat or cold intolerance; No hair loss  MUSCULOSKELETAL: No joint pain or swelling; No muscle, back, or extremity pain  PSYCHIATRIC: No depression, anxiety, mood swings, or difficulty sleeping  HEME/LYMPH: No easy bruising, or bleeding gums  ALLERY AND IMMUNOLOGIC: No hives or eczema    MEDICATIONS  (STANDING):  aspirin enteric coated 81 milliGRAM(s) Oral daily  docusate sodium 100 milliGRAM(s) Oral three times a day  polyethylene glycol 3350 17 Gram(s) Oral daily  pantoprazole    Tablet 40 milliGRAM(s) Oral before breakfast  furosemide    Tablet 20 milliGRAM(s) Oral daily  lisinopril 5 milliGRAM(s) Oral daily    MEDICATIONS  (PRN):  oxyCODONE    5 mG/acetaminophen 325 mG 1 Tablet(s) Oral every 4 hours PRN Moderate Pain (4 - 6)  oxyCODONE    5 mG/acetaminophen 325 mG 2 Tablet(s) Oral every 4 hours PRN Severe Pain (7 - 10)      PHYSICAL EXAM:  Vital Signs Last 24 Hrs  T(C): 36.3 (19 Sep 2017 07:36), Max: 36.9 (18 Sep 2017 23:50)  T(F): 97.3 (19 Sep 2017 07:36), Max: 98.4 (18 Sep 2017 23:50)  HR: 92 (19 Sep 2017 07:36) (92 - 99)  BP: -- A Line 67/59  BP(mean): --  RR: 18 (19 Sep 2017 07:36) (18 - 27)  SpO2: 96% (19 Sep 2017 07:36) (95% - 100%)  I&O's Summary    18 Sep 2017 07:01  -  19 Sep 2017 07:00  --------------------------------------------------------  IN: 660 mL / OUT: 1450 mL / NET: -790 mL      GENERAL: NAD, well-developed  HEAD:  Atraumatic, Normocephalic  EYES: EOMI, PERRLA, conjunctiva and sclera clear  NECK: Supple, No JVD  CHEST/LUNG: Clear to auscultation bilaterally; No wheezepoor air entry bilaterally  HEART: Regular rate and rhythm; No murmurs, rubs, or gallops  ABDOMEN: Soft, Nontender, Nondistended; Bowel sounds present  EXTREMITIES:  2+ Peripheral Pulses, No clubbing, cyanosis, or edema  PSYCH: AAOx3  NEUROLOGY: non-focal  SKIN: No rashes or lesions    LABS:  CAPILLARY BLOOD GLUCOSE                              8.8    14.1  )-----------( 162      ( 19 Sep 2017 05:09 )             26.3     09-19    127<L>  |  90<L>  |  13  ----------------------------<  101<H>  4.7   |  23  |  0.46<L>    Ca    8.7      19 Sep 2017 07:29  Phos  2.5     09-18    TPro  6.3  /  Alb  2.9<L>  /  TBili  0.4  /  DBili  x   /  AST  32  /  ALT  22  /  AlkPhos  86  09-19    PT/INR - ( 19 Sep 2017 09:24 )   PT: 22.9 sec;   INR: 2.09 ratio         PTT - ( 18 Sep 2017 02:39 )  PTT:29.5 sec          RADIOLOGY & ADDITIONAL TESTS:    Imaging Personally Reviewed:    Consultant(s) Notes Reviewed:      Care Discussed with Consultants/Other Providers:

## 2017-09-19 NOTE — PROGRESS NOTE ADULT - SUBJECTIVE AND OBJECTIVE BOX
Patient is a 62y old  Male who presents with a chief complaint of SOOB (25 Aug 2017 22:27)    Doing ok  sitting in chair  saying his breathing is so-so      Any change in ROS:     MEDICATIONS  (STANDING):  aspirin enteric coated 81 milliGRAM(s) Oral daily  docusate sodium 100 milliGRAM(s) Oral three times a day  polyethylene glycol 3350 17 Gram(s) Oral daily  pantoprazole    Tablet 40 milliGRAM(s) Oral before breakfast  furosemide    Tablet 20 milliGRAM(s) Oral daily  lisinopril 5 milliGRAM(s) Oral daily    MEDICATIONS  (PRN):  oxyCODONE    5 mG/acetaminophen 325 mG 1 Tablet(s) Oral every 4 hours PRN Moderate Pain (4 - 6)  oxyCODONE    5 mG/acetaminophen 325 mG 2 Tablet(s) Oral every 4 hours PRN Severe Pain (7 - 10)    Vital Signs Last 24 Hrs  T(C): 36.3 (19 Sep 2017 07:36), Max: 36.9 (18 Sep 2017 23:50)  T(F): 97.3 (19 Sep 2017 07:36), Max: 98.4 (18 Sep 2017 23:50)  HR: 92 (19 Sep 2017 07:36) (92 - 99)  BP: --  BP(mean): --  RR: 18 (19 Sep 2017 07:36) (18 - 27)  SpO2: 96% (19 Sep 2017 07:36) (95% - 100%)    I&O's Summary    18 Sep 2017 07:01  -  19 Sep 2017 07:00  --------------------------------------------------------  IN: 660 mL / OUT: 1450 mL / NET: -790 mL          Physical Exam:   GENERAL: NAD, well-groomed, well-developed  HEENT: ALONSO/   Atraumatic, Normocephalic  ENMT: No tonsillar erythema, exudates, or enlargement; Moist mucous membranes, Good dentition, No lesions  NECK: Supple, No JVD, Normal thyroid  CHEST/LUNG: poor air entry bilaterally   CVS: Regular rate and rhythm; No murmurs, rubs, or gallops  GI: : Soft, Nontender, Nondistended; Bowel sounds present  NERVOUS SYSTEM:  Alert & Oriented X3  EXTREMITIES:  2+ Peripheral Pulses, No clubbing, cyanosis, or edema  LYMPH: No lymphadenopathy noted  SKIN: No rashes or lesions  ENDOCRINOLOGY: No Thyromegaly  PSYCH: Appropriate    Labs:  ABG - ( 17 Sep 2017 22:20 )  pH: 7.46  /  pCO2: 43    /  pO2: 75    / HCO3: 30    / Base Excess: 6.2   /  SaO2: 96              28, 21, 36, 40, 40, 40, 40, 40, 40, 40, 40, 19, 100, 0, 0                            8.8    14.1  )-----------( 162      ( 19 Sep 2017 05:09 )             26.3                         9.0    12.9  )-----------( 123      ( 18 Sep 2017 02:39 )             25.9                         9.5    11.2  )-----------( 113      ( 17 Sep 2017 01:28 )             29.0                         9.1    13.0  )-----------( 102      ( 16 Sep 2017 02:05 )             26.2     09-19    127<L>  |  90<L>  |  13  ----------------------------<  101<H>  4.7   |  23  |  0.46<L>  09-18    126<L>  |  91<L>  |  17  ----------------------------<  158<H>  4.4   |  27  |  0.59  09-17    130<L>  |  90<L>  |  17  ----------------------------<  113<H>  4.4   |  30  |  0.59  09-16    128<L>  |  89<L>  |  19  ----------------------------<  124<H>  4.7   |  27  |  0.57    Ca    8.7      19 Sep 2017 07:29  Ca    8.6      18 Sep 2017 02:39  Phos  2.5     09-18    TPro  6.3  /  Alb  2.9<L>  /  TBili  0.4  /  DBili  x   /  AST  32  /  ALT  22  /  AlkPhos  86  09-19  TPro  6.0  /  Alb  2.8<L>  /  TBili  0.4  /  DBili  x   /  AST  25  /  ALT  27  /  AlkPhos  85  09-18  TPro  6.3  /  Alb  3.1<L>  /  TBili  0.4  /  DBili  x   /  AST  36  /  ALT  32  /  AlkPhos  91  09-17  TPro  6.5  /  Alb  3.5  /  TBili  0.5  /  DBili  x   /  AST  54<H>  /  ALT  44  /  AlkPhos  94  09-16    CAPILLARY BLOOD GLUCOSE          LIVER FUNCTIONS - ( 19 Sep 2017 07:29 )  Alb: 2.9 g/dL / Pro: 6.3 g/dL / ALK PHOS: 86 U/L / ALT: 22 U/L / AST: 32 U/L / GGT: x           PT/INR - ( 19 Sep 2017 09:24 )   PT: 22.9 sec;   INR: 2.09 ratio         PTT - ( 18 Sep 2017 02:39 )  PTT:29.5 sec    Cultures:             < from: Xray Chest 1 View AP- PORTABLE-Urgent (09.18.17 @ 09:52) >    PROCEDURE DATE:  09/18/2017            INTERPRETATION:  CLINICAL INFORMATION: s/p surgical drain removal.    EXAM: Single view chest radiograph.    COMPARISON:Chest x-ray 9/18/2017    FINDINGS:   Status post removal of a right-sided mediastinal drainage catheter.  Status post sternotomy.  Left-sided chest tube.  LVAD.  Cardiac annuloplasty.    The cardiac silhouette is enlarged; unchanged from the prior study.    The left basilar opacity is unchanged from the prior study.  No pneumothorax.    No acute osseous abnormalities.    IMPRESSION:   No pneumothorax  Left lower lobe pneumonia and/or atelectasis.    < end of copied text >                  Studies  Chest X-RAY  CT SCAN Chest   Venous Dopplers: LE:   CT Abdomen  Others

## 2017-09-19 NOTE — PROGRESS NOTE ADULT - PROBLEM SELECTOR PLAN 1
doing ok: can restart spiriva 1 puff once a day !!  Chest x-ray from yesterday reviewed: Doubt pneumonia: seems to be more like atelectasis: Continue incentive spirometry!!

## 2017-09-19 NOTE — PROGRESS NOTE ADULT - ASSESSMENT
Mr. Quispe is a 62 year old man with a nonischemic cardiomyopathy with ACC/AHA stage D congestive heart failure who presented with abdominal pain due to cardiogenic shock, now s/p Heart Mate II LVAD with TV annuloplasty ring on 9/12. His postoperative course was complicated by bleeding requiring transfusions. He currently is euvolemic without evidence of right heart failure. His INR is therapeutic and his MAPs are at goal. His primary issues is sternal pain at the moment. He has a left pleural chest tube in place with minimal drainage.

## 2017-09-20 ENCOUNTER — TRANSCRIPTION ENCOUNTER (OUTPATIENT)
Age: 62
End: 2017-09-20

## 2017-09-20 LAB
ANION GAP SERPL CALC-SCNC: 13 MMOL/L — SIGNIFICANT CHANGE UP (ref 5–17)
APTT BLD: 42.2 SEC — HIGH (ref 27.5–37.4)
BASOPHILS # BLD AUTO: 0 K/UL — SIGNIFICANT CHANGE UP (ref 0–0.2)
BASOPHILS NFR BLD AUTO: 0.1 % — SIGNIFICANT CHANGE UP (ref 0–2)
BUN SERPL-MCNC: 19 MG/DL — SIGNIFICANT CHANGE UP (ref 7–23)
CALCIUM SERPL-MCNC: 9 MG/DL — SIGNIFICANT CHANGE UP (ref 8.4–10.5)
CHLORIDE SERPL-SCNC: 90 MMOL/L — LOW (ref 96–108)
CO2 SERPL-SCNC: 23 MMOL/L — SIGNIFICANT CHANGE UP (ref 22–31)
CREAT SERPL-MCNC: 0.53 MG/DL — SIGNIFICANT CHANGE UP (ref 0.5–1.3)
EOSINOPHIL # BLD AUTO: 0.3 K/UL — SIGNIFICANT CHANGE UP (ref 0–0.5)
EOSINOPHIL NFR BLD AUTO: 2 % — SIGNIFICANT CHANGE UP (ref 0–6)
GLUCOSE SERPL-MCNC: 94 MG/DL — SIGNIFICANT CHANGE UP (ref 70–99)
HCT VFR BLD CALC: 27.2 % — LOW (ref 39–50)
HGB BLD-MCNC: 9.6 G/DL — LOW (ref 13–17)
INR BLD: 1.65 RATIO — HIGH (ref 0.88–1.16)
INR BLD: 1.79 RATIO — HIGH (ref 0.88–1.16)
LDH SERPL L TO P-CCNC: 385 U/L — HIGH (ref 50–242)
LYMPHOCYTES # BLD AUTO: 1.2 K/UL — SIGNIFICANT CHANGE UP (ref 1–3.3)
LYMPHOCYTES # BLD AUTO: 9.3 % — LOW (ref 13–44)
MCHC RBC-ENTMCNC: 34.8 PG — HIGH (ref 27–34)
MCHC RBC-ENTMCNC: 35.3 GM/DL — SIGNIFICANT CHANGE UP (ref 32–36)
MCV RBC AUTO: 98.4 FL — SIGNIFICANT CHANGE UP (ref 80–100)
MONOCYTES # BLD AUTO: 1.4 K/UL — HIGH (ref 0–0.9)
MONOCYTES NFR BLD AUTO: 10.5 % — SIGNIFICANT CHANGE UP (ref 2–14)
NEUTROPHILS # BLD AUTO: 10.4 K/UL — HIGH (ref 1.8–7.4)
NEUTROPHILS NFR BLD AUTO: 78 % — HIGH (ref 43–77)
PLATELET # BLD AUTO: 215 K/UL — SIGNIFICANT CHANGE UP (ref 150–400)
POTASSIUM SERPL-MCNC: 4.8 MMOL/L — SIGNIFICANT CHANGE UP (ref 3.5–5.3)
POTASSIUM SERPL-SCNC: 4.8 MMOL/L — SIGNIFICANT CHANGE UP (ref 3.5–5.3)
PROTHROM AB SERPL-ACNC: 18.2 SEC — HIGH (ref 9.8–12.7)
PROTHROM AB SERPL-ACNC: 19.8 SEC — HIGH (ref 9.8–12.7)
RBC # BLD: 2.76 M/UL — LOW (ref 4.2–5.8)
RBC # FLD: 13.9 % — SIGNIFICANT CHANGE UP (ref 10.3–14.5)
SODIUM SERPL-SCNC: 126 MMOL/L — LOW (ref 135–145)
WBC # BLD: 13.4 K/UL — HIGH (ref 3.8–10.5)
WBC # FLD AUTO: 13.4 K/UL — HIGH (ref 3.8–10.5)

## 2017-09-20 PROCEDURE — 99233 SBSQ HOSP IP/OBS HIGH 50: CPT | Mod: 25,GC

## 2017-09-20 PROCEDURE — 93750 INTERROGATION VAD IN PERSON: CPT

## 2017-09-20 RX ORDER — LISINOPRIL 2.5 MG/1
7.5 TABLET ORAL DAILY
Qty: 0 | Refills: 0 | Status: DISCONTINUED | OUTPATIENT
Start: 2017-09-20 | End: 2017-09-26

## 2017-09-20 RX ORDER — HEPARIN SODIUM 5000 [USP'U]/ML
900 INJECTION INTRAVENOUS; SUBCUTANEOUS
Qty: 25000 | Refills: 0 | Status: DISCONTINUED | OUTPATIENT
Start: 2017-09-20 | End: 2017-09-23

## 2017-09-20 RX ORDER — SORBITOL SOLUTION 70 %
30 SOLUTION, ORAL MISCELLANEOUS ONCE
Qty: 0 | Refills: 0 | Status: COMPLETED | OUTPATIENT
Start: 2017-09-20 | End: 2017-09-21

## 2017-09-20 RX ORDER — LISINOPRIL 2.5 MG/1
2.5 TABLET ORAL ONCE
Qty: 0 | Refills: 0 | Status: COMPLETED | OUTPATIENT
Start: 2017-09-20 | End: 2017-09-20

## 2017-09-20 RX ORDER — HEPARIN SODIUM 5000 [USP'U]/ML
800 INJECTION INTRAVENOUS; SUBCUTANEOUS
Qty: 25000 | Refills: 0 | Status: DISCONTINUED | OUTPATIENT
Start: 2017-09-20 | End: 2017-09-20

## 2017-09-20 RX ORDER — WARFARIN SODIUM 2.5 MG/1
5 TABLET ORAL ONCE
Qty: 0 | Refills: 0 | Status: COMPLETED | OUTPATIENT
Start: 2017-09-20 | End: 2017-09-20

## 2017-09-20 RX ADMIN — HYDROMORPHONE HYDROCHLORIDE 4 MILLIGRAM(S): 2 INJECTION INTRAMUSCULAR; INTRAVENOUS; SUBCUTANEOUS at 03:45

## 2017-09-20 RX ADMIN — HEPARIN SODIUM 8 UNIT(S)/HR: 5000 INJECTION INTRAVENOUS; SUBCUTANEOUS at 10:53

## 2017-09-20 RX ADMIN — Medication 81 MILLIGRAM(S): at 13:01

## 2017-09-20 RX ADMIN — Medication 100 MILLIGRAM(S): at 13:01

## 2017-09-20 RX ADMIN — HYDROMORPHONE HYDROCHLORIDE 4 MILLIGRAM(S): 2 INJECTION INTRAMUSCULAR; INTRAVENOUS; SUBCUTANEOUS at 13:15

## 2017-09-20 RX ADMIN — HYDROMORPHONE HYDROCHLORIDE 4 MILLIGRAM(S): 2 INJECTION INTRAMUSCULAR; INTRAVENOUS; SUBCUTANEOUS at 23:55

## 2017-09-20 RX ADMIN — WARFARIN SODIUM 5 MILLIGRAM(S): 2.5 TABLET ORAL at 21:50

## 2017-09-20 RX ADMIN — Medication 10 MILLIGRAM(S): at 09:55

## 2017-09-20 RX ADMIN — HYDROMORPHONE HYDROCHLORIDE 4 MILLIGRAM(S): 2 INJECTION INTRAMUSCULAR; INTRAVENOUS; SUBCUTANEOUS at 09:34

## 2017-09-20 RX ADMIN — HYDROMORPHONE HYDROCHLORIDE 4 MILLIGRAM(S): 2 INJECTION INTRAMUSCULAR; INTRAVENOUS; SUBCUTANEOUS at 00:30

## 2017-09-20 RX ADMIN — HYDROMORPHONE HYDROCHLORIDE 4 MILLIGRAM(S): 2 INJECTION INTRAMUSCULAR; INTRAVENOUS; SUBCUTANEOUS at 13:45

## 2017-09-20 RX ADMIN — LISINOPRIL 2.5 MILLIGRAM(S): 2.5 TABLET ORAL at 10:53

## 2017-09-20 RX ADMIN — Medication 100 MILLIGRAM(S): at 05:38

## 2017-09-20 RX ADMIN — Medication 100 MILLIGRAM(S): at 21:50

## 2017-09-20 RX ADMIN — HYDROMORPHONE HYDROCHLORIDE 4 MILLIGRAM(S): 2 INJECTION INTRAMUSCULAR; INTRAVENOUS; SUBCUTANEOUS at 04:15

## 2017-09-20 RX ADMIN — LISINOPRIL 5 MILLIGRAM(S): 2.5 TABLET ORAL at 05:38

## 2017-09-20 RX ADMIN — PANTOPRAZOLE SODIUM 40 MILLIGRAM(S): 20 TABLET, DELAYED RELEASE ORAL at 05:39

## 2017-09-20 RX ADMIN — POLYETHYLENE GLYCOL 3350 17 GRAM(S): 17 POWDER, FOR SOLUTION ORAL at 13:02

## 2017-09-20 RX ADMIN — Medication 20 MILLIGRAM(S): at 05:38

## 2017-09-20 RX ADMIN — HYDROMORPHONE HYDROCHLORIDE 4 MILLIGRAM(S): 2 INJECTION INTRAMUSCULAR; INTRAVENOUS; SUBCUTANEOUS at 10:10

## 2017-09-20 NOTE — DISCHARGE NOTE ADULT - PROVIDER TOKENS
TOKEN:'[id number]',[id number]',YOLANDA:'[id number]' TOKEN:'[id number]:'[id number]:'[id number]',TOKEN:'[id number]'

## 2017-09-20 NOTE — PROGRESS NOTE ADULT - ASSESSMENT
62M NICM  (LVDD 6.7 cm) HFrEF, (LVEF 10%), , pw abd pain, elevated lactic acid. Initial RHC with depressed CI (0.6).  Now s/p LVAD BTR.   ACC/AHA stage D with NYHA class IV symptoms    continues to do well hemodynamically. He complains of sternotomy pain. 62M NICM  (LVDD 6.7 cm) HFrEF, (LVEF 10%), , pw abd pain, elevated lactic acid. Initial RHC with depressed CI (0.6).  Now s/p LVAD BTR.   ACC/AHA stage D with NYHA class IV symptoms    continues to do well hemodynamically. He complains of sternotomy pain.     Lasix 20mg PO given today.

## 2017-09-20 NOTE — DISCHARGE NOTE ADULT - OTHER SIGNIFICANT FINDINGS
General: WN/WD NAD  Neurology: A&Ox3, nonfocal, BLANDON x 4  Head:  Normocephalic, atraumatic  ENT:  Mucosa moist, no ulcerations  Neck:  Supple, no sinuses or palpable masses  Lymphatic:  No palpable cervical, supraclavicular, axillary or inguinal adenopathy  Respiratory: CTA B/L  CV:  (+) LVAD hum   Abdominal: Soft, NT, ND no palpable mass; (+) RLQ drive line cath site with occlusive dressing C/D/I   MSK: No edema, + peripheral pulses, FROM all 4 extremity  Incisions: intact, no erythema or drainage

## 2017-09-20 NOTE — DISCHARGE NOTE ADULT - CARE PROVIDERS DIRECT ADDRESSES
[email or web address]_Unknown,[email or web address],DirectAddress_Unknown

## 2017-09-20 NOTE — DISCHARGE NOTE ADULT - COMMUNITY RESOURCES
[place] transportation arranged with [employer] [phone]. Please call medical answering service at [phone] to arrange transportation for future appointments.

## 2017-09-20 NOTE — DISCHARGE NOTE ADULT - HOSPITAL COURSE
61 y/o Creole speaking male presented with abdominal pain and found to be in low output heart failure with cardiogenic shock (EF 10%) s/p 9/12 LVAD/TV annuloplasty for INTERMACS 2   Postop Course:   Acute blood loss anemia 2/2 blood loss postop -->requiring PRBC, amicar, PLT, cryo.  TTE 9/13 LVEDD 5.9 cm, mild-mod MR, aortic valve closed, mild RV dysfunction, mild TR.   c/o L sided pain; found to have L hemithorax s/p pigtail that was removed; fluid c/w exudative with lot of RBC,   (+) Hyponatremia - renal consulted- urine studies sent / Endocrine following for SIADH work up  10/6 Random Urine: 49; Patient euthyroid and not adrenally insufficient -->Patient dx with SIADH, on fluid restriction.  (+) depressive mood --> psych following; Remeron initiated; started at 7.5 mg HS; avoid up titrating 2/2 risk of worsening hyponatremia    LVAD interrogated w/o events.   10/9 Small amount of rectal bleeding after bowel movement; (+) internal hemorrhoids --> Continue bowel regimen / Start Preparation H suppository TID PRN.   No further bleeding noted. pt ambulating with a walker.  Psych follow up pending  Lisinopril d/c yesterday for hypotension, stable  Pain meds increased by palliative  10/12  coumadin dosing, NA+ improving 134, ENDO for SIADH,   10/13  coumadin dosing, lisinopril added per HF  10/14   VSS   ambulating  10/15  VSS   INR down to 1.78   repat INR this afternoon   ?Hep GTT  10/16 INR > 2- no heparin gttp indicated.  continue meds as per CHF team; vss; pt stable; MAP 76   10/17 INR>2 D/W patient family returning from [facility] on this week  pending discharge to home ambulating coumadin 5 tonight; discharge planning to home vs rehab pending placement   10/20 INR 1.6 - Coumadin 45mg tonight;  and MAP 70;s    10/21 INR 1.5 Coumadin 5 mg tonight; ; pt stable  10/23 INR 1.6; ; Coumadin 7.5 mg po tonight   10/25 INR 1.7 ; + UA Cipro 500 mg PO BID initiated x 7 days.  Patient discharged home with PT

## 2017-09-20 NOTE — DISCHARGE NOTE ADULT - MEDICATION SUMMARY - MEDICATIONS TO TAKE
I will START or STAY ON the medications listed below when I get home from the hospital:    spironolactone 25 mg oral tablet  -- 0.5 tab(s) by mouth once a day  -- Indication: For Diuretic     aspirin 81 mg oral delayed release tablet  -- 1 tab(s) by mouth once a day  -- Indication: For Blood thinner     oxyCODONE 5 mg oral tablet  -- 1 tab(s) by mouth every 6 hours, As needed, Severe Pain (7 - 10) MDD:4 tabs  -- Indication: For Pain management     lisinopril 2.5 mg oral tablet  -- 3 tab(s) by mouth once a day (at bedtime)  -- Indication: For Blood pressure     Coumadin 5 mg oral tablet  -- 1 tab(s) by mouth once a day   -- Do not take this drug if you are pregnant.  It is very important that you take or use this exactly as directed.  Do not skip doses or discontinue unless directed by your doctor.  Obtain medical advice before taking any non-prescription drugs as some may affect the action of this medication.    -- Indication: For Blood thinner for LVAD     mirtazapine 7.5 mg oral tablet  -- 1 tab(s) by mouth once a day (at bedtime)  -- Indication: For Depression     metoprolol succinate 50 mg oral tablet, extended release  -- 1 tab(s) by mouth once a day  -- Indication: For Blood pressure and heart rate control     lidocaine 5% topical film  -- Apply on skin to affected area once a day   -- Indication: For Pain     polyethylene glycol 3350 oral powder for reconstitution  -- 17 gram(s) by mouth once a day (at bedtime), As Needed -for constipation   -- Indication: For Stool softner / Laxative     Pyridium 200 mg oral tablet  -- 1 tab(s) by mouth 3 times a day (after meals)   -- May discolor urine or feces.  Medication should be taken with plenty of water.  Take with food or milk.    -- Indication: For UTI symptoms     pantoprazole 40 mg oral delayed release tablet  -- 1 tab(s) by mouth once a day (before a meal)  -- Indication: For ACid Reflux     ciprofloxacin 500 mg oral tablet  -- 1 tab(s) by mouth every 12 hours  -- Indication: For Urinary tract infection

## 2017-09-20 NOTE — DISCHARGE NOTE ADULT - PLAN OF CARE
s/p LVAD implant --> goal recovery from surgical procedure 1. Maintain LVAD equipment dry at all times  2. Weight yourself daily and notify any weight gain greater than 2-3 pounds in 24 hours.  3. Regular diet - low fat, low cholesterol, no added salt.  4. Cleanse Midsternal incision and leg incision daily when bathing with warm water and mild soap, pat dry and maintain open to air.   5. Follow LVAD  Surgery Do's and Don'ts discharge instructions.   6. No driving until cleared by MD.   7. No heavy lifting nothing greater than 5 pounds until cleared by MD.   8. Call / Notify MD any fever greater than 101.0  9. Increase Activity as tolerated. Recovery Follow same instructions as listed above

## 2017-09-20 NOTE — PROGRESS NOTE ADULT - SUBJECTIVE AND OBJECTIVE BOX
INFECTIOUS DISEASES FOLLOW UP-- Anitra Prater  [phone]    This is a follow up note for this  62yMale s/p heartmate-2 LVAD placement, doing well in the step down area sleeping in a chair.  C/o incisional pain but no SOB, fevers, or drainage from wound      ROS:  CONSTITUTIONAL:  No fever, good appetite  CARDIOVASCULAR:  No chest pain or palpitations, incisional pain  RESPIRATORY:  No dyspnea  GASTROINTESTINAL:  No nausea, vomiting, diarrhea, or abdominal pain  GENITOURINARY:  No dysuria  NEUROLOGIC:  No headache,     Allergies    No Known Allergies    Intolerances        ANTIBIOTICS/RELEVANT:  antimicrobials    immunologic:    OTHER:  aspirin enteric coated 81 milliGRAM(s) Oral daily  docusate sodium 100 milliGRAM(s) Oral three times a day  polyethylene glycol 3350 17 Gram(s) Oral daily  pantoprazole    Tablet 40 milliGRAM(s) Oral before breakfast  lisinopril 5 milliGRAM(s) Oral daily  HYDROmorphone   Tablet 2 milliGRAM(s) Oral every 3 hours PRN  HYDROmorphone   Tablet 4 milliGRAM(s) Oral every 3 hours PRN  bisacodyl Suppository 10 milliGRAM(s) Rectal daily PRN  sorbitol 70% Solution 30 milliLiter(s) Oral once  lisinopril 7.5 milliGRAM(s) Oral daily  heparin  Infusion 800 Unit(s)/Hr IV Continuous <Continuous>  warfarin 5 milliGRAM(s) Oral once      Objective:  Vital Signs Last 24 Hrs  T(C): 36.9 (20 Sep 2017 14:00), Max: 37.1 (19 Sep 2017 22:00)  T(F): 98.4 (20 Sep 2017 14:00), Max: 98.7 (19 Sep 2017 22:00)  HR: 94 (20 Sep 2017 14:00) (92 - 101)  BP: --  BP(mean): 70 (20 Sep 2017 14:00) (70 - 84)  RR: 16 (20 Sep 2017 14:00) (16 - 18)  SpO2: 99% (20 Sep 2017 14:00) (95% - 99%)    PHYSICAL EXAM:  Constitutional:no acute distress  Eyes:ALONSO, EOMI  Ear/Nose/Throat: no oral lesions, 	  Respiratory: clear BL  Cardiovascular: S1S2  Gastrointestinal:soft, (+) BS, no tenderness  Extremities:no e/e/c  No Lymphadenopathy  IV sites not inflammed.    LABS:                        9.6    13.4  )-----------( 215      ( 20 Sep 2017 04:11 )             27.2     09-20    126<L>  |  90<L>  |  19  ----------------------------<  94  4.8   |  23  |  0.53    Ca    9.0      20 Sep 2017 04:11    TPro  6.3  /  Alb  2.9<L>  /  TBili  0.4  /  DBili  x   /  AST  32  /  ALT  22  /  AlkPhos  86  09-19    PT/INR - ( 20 Sep 2017 17:38 )   PT: 18.2 sec;   INR: 1.65 ratio         PTT - ( 20 Sep 2017 17:38 )  PTT:42.2 sec      MICROBIOLOGY:    Urine Microscopic-Add On (NC) (09.15.17 @ 11:47)    White Blood Cell - Urine: 5-10 /HPF    Red Blood Cell - Urine: >50 /HPF            RECENT CULTURES:      RADIOLOGY & ADDITIONAL STUDIES:

## 2017-09-20 NOTE — PROGRESS NOTE ADULT - PROBLEM SELECTOR PLAN 3
INR 1.79 todday  Coumadin  Goal 2-2.5  Continue aspirin 81 mg daily  heparin bridge INR 1.79 todday  Coumadin with heparin bridge  Goal 2-2.5  Continue aspirin 81 mg daily  heparin bridge

## 2017-09-20 NOTE — DISCHARGE NOTE ADULT - ADDITIONAL INSTRUCTIONS
1. Follow up with Tamanna Mcclendon NP at the LVAD clinic for your post op follow up appointment on  Thursday 11/2/17 at 9:00am, please call the [facility] office to confirm your appointment at [phone].   2. Please have your PT/INR levels checked on Friday 10/27 with [facility] home blood draws and then weekly, please follow up with PATIENCE Mcclendon for coumadin dosage.

## 2017-09-20 NOTE — PROGRESS NOTE ADULT - ASSESSMENT
63 yo man Australian primarily Australian s[peaking who is s/p LVAD placement day # 8 and doing well.    Patient is off antibiotics  Sternal wound is healing without erythema or drainage    Will sign off  Call if questions arise  [phone]

## 2017-09-20 NOTE — DISCHARGE NOTE ADULT - PATIENT PORTAL LINK FT
“You can access the Follow[employer] Patient Portal, offered by [facility], by registering with the following website: [email or web address]”

## 2017-09-20 NOTE — DISCHARGE NOTE ADULT - CARE PLAN
Principal Discharge DX:	LVAD (left ventricular assist device) present  Goal:	s/p LVAD implant --> goal recovery from surgical procedure  Instructions for follow-up, activity and diet:	1. Maintain LVAD equipment dry at all times  2. Weight yourself daily and notify any weight gain greater than 2-3 pounds in 24 hours.  3. Regular diet - low fat, low cholesterol, no added salt.  4. Cleanse Midsternal incision and leg incision daily when bathing with warm water and mild soap, pat dry and maintain open to air.   5. Follow LVAD  Surgery Do's and Don'ts discharge instructions.   6. No driving until cleared by MD.   7. No heavy lifting nothing greater than 5 pounds until cleared by MD.   8. Call / Notify MD any fever greater than 101.0  9. Increase Activity as tolerated.  Secondary Diagnosis:	S/P TVR (tricuspid valve replacement) Principal Discharge DX:	LVAD (left ventricular assist device) present  Goal:	s/p LVAD implant --> goal recovery from surgical procedure  Instructions for follow-up, activity and diet:	1. Maintain LVAD equipment dry at all times  2. Weight yourself daily and notify any weight gain greater than 2-3 pounds in 24 hours.  3. Regular diet - low fat, low cholesterol, no added salt.  4. Cleanse Midsternal incision and leg incision daily when bathing with warm water and mild soap, pat dry and maintain open to air.   5. Follow LVAD  Surgery Do's and Don'ts discharge instructions.   6. No driving until cleared by MD.   7. No heavy lifting nothing greater than 5 pounds until cleared by MD.   8. Call / Notify MD any fever greater than 101.0  9. Increase Activity as tolerated.  Secondary Diagnosis:	S/P TVR (tricuspid valve repair)  Goal:	Recovery  Instructions for follow-up, activity and diet:	Follow same instructions as listed above

## 2017-09-20 NOTE — PROGRESS NOTE ADULT - SUBJECTIVE AND OBJECTIVE BOX
Patient is a 62y old  Male who presents with a chief complaint of SOOB (25 Aug 2017 22:27)      SUBJECTIVE / OVERNIGHT EVENTS:  No new shortness of breath.  Review of Systems:   CONSTITUTIONAL: No fever, weight loss, or fatigue  EYES: No eye pain, visual disturbances, or discharge  ENMT:  No difficulty hearing, tinnitus, vertigo; No sinus or throat pain  NECK: No pain or stiffness  BREASTS: No pain, masses, or nipple discharge  RESPIRATORY: No cough, wheezing, chills or hemoptysis; No shortness of breath  CARDIOVASCULAR: No chest pain, palpitations, dizziness, or leg swelling  GASTROINTESTINAL: No abdominal or epigastric pain. No nausea, vomiting, or hematemesis; No diarrhea or constipation. No melena or hematochezia.  GENITOURINARY: No dysuria, frequency, hematuria, or incontinence  NEUROLOGICAL: No headaches, memory loss, loss of strength, numbness, or tremors  SKIN: No itching, burning, rashes, or lesions   LYMPH NODES: No enlarged glands  ENDOCRINE: No heat or cold intolerance; No hair loss  MUSCULOSKELETAL: No joint pain or swelling; No muscle, back, or extremity pain  PSYCHIATRIC: No depression, anxiety, mood swings, or difficulty sleeping  HEME/LYMPH: No easy bruising, or bleeding gums  ALLERY AND IMMUNOLOGIC: No hives or eczema    MEDICATIONS  (STANDING):  aspirin enteric coated 81 milliGRAM(s) Oral daily  docusate sodium 100 milliGRAM(s) Oral three times a day  polyethylene glycol 3350 17 Gram(s) Oral daily  pantoprazole    Tablet 40 milliGRAM(s) Oral before breakfast  furosemide    Tablet 20 milliGRAM(s) Oral daily  lisinopril 5 milliGRAM(s) Oral daily  sorbitol 70% Solution 30 milliLiter(s) Oral once  lisinopril 7.5 milliGRAM(s) Oral daily  heparin  Infusion 800 Unit(s)/Hr (8 mL/Hr) IV Continuous <Continuous>  warfarin 5 milliGRAM(s) Oral once    MEDICATIONS  (PRN):  HYDROmorphone   Tablet 2 milliGRAM(s) Oral every 3 hours PRN Moderate Pain (4 - 6)  HYDROmorphone   Tablet 4 milliGRAM(s) Oral every 3 hours PRN Severe Pain (7 - 10)  bisacodyl Suppository 10 milliGRAM(s) Rectal daily PRN Constipation      PHYSICAL EXAM:  Vital Signs Last 24 Hrs  T(C): 36.8 (20 Sep 2017 10:00), Max: 37.1 (19 Sep 2017 22:00)  T(F): 98.2 (20 Sep 2017 10:00), Max: 98.7 (19 Sep 2017 22:00)  HR: 95 (20 Sep 2017 10:00) (92 - 101)  BP: --  BP(mean): 76 (20 Sep 2017 10:00) (76 - 84)  RR: 16 (20 Sep 2017 10:00) (16 - 18)  SpO2: 99% (20 Sep 2017 10:00) (95% - 99%)  I&O's Summary    19 Sep 2017 07:01  -  20 Sep 2017 07:00  --------------------------------------------------------  IN: 290 mL / OUT: 1100 mL / NET: -810 mL    20 Sep 2017 07:01  -  20 Sep 2017 12:54  --------------------------------------------------------  IN: 300 mL / OUT: 0 mL / NET: 300 mL      GENERAL: NAD, well-developed  HEAD:  Atraumatic, Normocephalic  EYES: EOMI, PERRLA, conjunctiva and sclera clear  NECK: Supple, No JVD  CHEST/LUNG: Clear to auscultation bilaterally; No wheeze  HEART: Regular rate and rhythm; No murmurs, rubs, or gallops  ABDOMEN: Soft, Nontender, Nondistended; Bowel sounds present  EXTREMITIES:  2+ Peripheral Pulses, No clubbing, cyanosis, or edema  PSYCH: AAOx3  NEUROLOGY: non-focal  SKIN: No rashes or lesions  Chest tubes are in place.  LABS:  CAPILLARY BLOOD GLUCOSE                              9.6    13.4  )-----------( 215      ( 20 Sep 2017 04:11 )             27.2     09-20    126<L>  |  90<L>  |  19  ----------------------------<  94  4.8   |  23  |  0.53    Ca    9.0      20 Sep 2017 04:11    TPro  6.3  /  Alb  2.9<L>  /  TBili  0.4  /  DBili  x   /  AST  32  /  ALT  22  /  AlkPhos  86  09-19    PT/INR - ( 20 Sep 2017 04:11 )   PT: 19.8 sec;   INR: 1.79 ratio                   RADIOLOGY & ADDITIONAL TESTS:    Imaging Personally Reviewed:    Consultant(s) Notes Reviewed:      Care Discussed with Consultants/Other Providers:

## 2017-09-20 NOTE — PROGRESS NOTE ADULT - PROBLEM SELECTOR PLAN 1
Anticoagulation-heparin added today  F/u ldh  Ambulate  D/c chest tube when the drainage decreases.  Off milrinone  prn diuretics, decreased for now

## 2017-09-20 NOTE — PROGRESS NOTE ADULT - ATTENDING COMMENTS
Mr. Quispe is a 62 year old man with a nonischemic cardiomyopathy with ACC/AHA stage D congestive heart failure who presented with abdominal pain due to cardiogenic shock, now s/p Heart Mate II LVAD with TV annuloplasty ring on 9/12. His postoperative course was complicated by bleeding requiring transfusions. He currently is euvolemic without evidence of right heart failure. His INR is slightly subtherapeutic and his MAPs are at goal. His primary issues is sternal pain at the moment. We will start him on heparin and increase coumadin to 5 mg tonight. Given hyponatremia, will stop diuretics for now.

## 2017-09-20 NOTE — PROGRESS NOTE ADULT - ASSESSMENT
63 yo male with no PMHx presented with sob and abdominal pain  on 8/25/17.  Cta - for PE,  cardiac mRI and cath revealed   lv dysfunction and MR.  Cardiology was consulted, HF service consulted and the pt was placed in ccu with pulmonary edema. He had  low cardiac output and cardiogenic shock, and was  referred  for LVAD evaluation. ID was consulted to clear the patient for surgery.. GI was following for abd pain, no significant findings on virtual colonoscopy. He was treated for chf  with  afterload reduction  and milrinone and stabilized, euvolemic .  9/12/17 S/P Implantation of HeartMate II left ventricular assist device through median sternotomy, and repair of the tricuspid valve with a number 28 classic Christianson ring.  Post op he required inotropic support and PRBc's for acute blood loss anemia  He was transferred to sdu  9/18  He has c/o pain at the incision site and chest tube site  Chest tube to be d/c  9/20  Pain improved .  INR drifted to 1.8, Heparin started.  Lisinopril increased for BP and afterload

## 2017-09-20 NOTE — DISCHARGE NOTE ADULT - NS AS ACTIVITY OBS
Stairs allowed/Walking-Outdoors allowed/Do not make important decisions/Do not drive or operate machinery/No Heavy lifting/straining/Walking-Indoors allowed

## 2017-09-20 NOTE — PROGRESS NOTE ADULT - SUBJECTIVE AND OBJECTIVE BOX
im tired and my chest hurts today    VITAL SIGNS    Telemetry:  nsr/ st      Vital Signs Last 24 Hrs  T(C): 36.8 (17 @ 10:00), Max: 37.1 (17 @ 22:00)  T(F): 98.2 (17 @ 10:00), Max: 98.7 (17 @ 22:00)  HR: 95 (17 @ 10:00) (92 - 101)  BP: -- 76-80  RR: 18 (17 @ 05:00) (18 - 18)  SpO2: 99% (17 @ 05:00) (95% - 99%)                    @ 07:01  -   @ 07:00  --------------------------------------------------------  IN: 290 mL / OUT: 1100 mL / NET: -810 mL     @ 07:01  -   @ 11:19  --------------------------------------------------------  IN: 300 mL / OUT: 0 mL / NET: 300 mL          Daily     Daily Weight in k.9 (20 Sep 2017 08:22)        CAPILLARY BLOOD GLUCOSE                      Coumadin    [ x] YES          [  ]      NO         Reason:     lvad                          PHYSICAL EXAM        Neurology: alert and oriented x 3, nonfocal, no gross deficits  CV : S1 S2 , RRR   Sternal Wound :  CDI , Stable  Pacing Wires      no  Lungs: cta  Drains:     no  Abdomen: soft, nontender, nondistended, positive bowel soundst   :           voiding  Extremities:      -   edema, negative calve tenderness,   leg incision cdi           aspirin enteric coated 81 milliGRAM(s) Oral daily  docusate sodium 100 milliGRAM(s) Oral three times a day  polyethylene glycol 3350 17 Gram(s) Oral daily  pantoprazole    Tablet 40 milliGRAM(s) Oral before breakfast  furosemide    Tablet 20 milliGRAM(s) Oral daily  lisinopril 5 milliGRAM(s) Oral daily  HYDROmorphone   Tablet 2 milliGRAM(s) Oral every 3 hours PRN  HYDROmorphone   Tablet 4 milliGRAM(s) Oral every 3 hours PRN  bisacodyl Suppository 10 milliGRAM(s) Rectal daily PRN  sorbitol 70% Solution 30 milliLiter(s) Oral once  lisinopril 7.5 milliGRAM(s) Oral daily  heparin  Infusion 800 Unit(s)/Hr IV Continuous <Continuous>                    Physical Therapy Rec:   Home  [  ]   Home w/ PT  x[  ]  Rehab  [  ]  Discussed with Cardiothoracic Team at AM rounds.

## 2017-09-20 NOTE — PROGRESS NOTE ADULT - SUBJECTIVE AND OBJECTIVE BOX
24H hour events:  No acute events in last 24 hours. Patient seen in chair, complains of sternotomy pain    MEDICATIONS:  aspirin enteric coated 81 milliGRAM(s) Oral daily  furosemide    Tablet 20 milliGRAM(s) Oral daily  lisinopril 5 milliGRAM(s) Oral daily  lisinopril 7.5 milliGRAM(s) Oral daily    heparin  Infusion 800 Unit(s)/Hr IV Continuous <Continuous>  HYDROmorphone   Tablet 2 milliGRAM(s) Oral every 3 hours PRN  HYDROmorphone   Tablet 4 milliGRAM(s) Oral every 3 hours PRN    docusate sodium 100 milliGRAM(s) Oral three times a day  polyethylene glycol 3350 17 Gram(s) Oral daily  pantoprazole    Tablet 40 milliGRAM(s) Oral before breakfast  bisacodyl Suppository 10 milliGRAM(s) Rectal daily PRN  sorbitol 70% Solution 30 milliLiter(s) Oral once            PHYSICAL EXAM:  T(C): 36.8 (09-20-17 @ 10:00), Max: 37.1 (09-19-17 @ 22:00)  HR: 95 (09-20-17 @ 10:00) (92 - 101)  BP: --  RR: 18 (09-20-17 @ 05:00) (18 - 18)  SpO2: 99% (09-20-17 @ 05:00) (95% - 99%)  Wt(kg): --  I&O's Summary    19 Sep 2017 07:01  -  20 Sep 2017 07:00  --------------------------------------------------------  IN: 290 mL / OUT: 1100 mL / NET: -810 mL    20 Sep 2017 07:01  -  20 Sep 2017 11:21  --------------------------------------------------------  IN: 300 mL / OUT: 0 mL / NET: 300 mL        Appearance: Normal	  HEENT:   Normal oral mucosa  Lymphatic: No lymphadenopathy  Cardiovascular: sternotomy well healed. mechanical VAD sounds.     No JVD,      No murmurs,      No edema  Respiratory: Lungs clear to auscultation	  Psychiatry: Mood & affect appropriate  Gastrointestinal:  Soft, Non-tender	  Skin: No rashes, No ecchymoses, No cyanosis	  Neurologic: Non-focal  Extremities: Normal range of motion, No clubbing, cyanosis   Vascular: warm extremities        LABS:	 	    CBC Full  -  ( 20 Sep 2017 04:11 )  WBC Count : 13.4 K/uL  Hemoglobin : 9.6 g/dL  Hematocrit : 27.2 %  Platelet Count - Automated : 215 K/uL  Mean Cell Volume : 98.4 fl  Mean Cell Hemoglobin : 34.8 pg  Mean Cell Hemoglobin Concentration : 35.3 gm/dL  Auto Neutrophil # : 10.4 K/uL  Auto Lymphocyte # : 1.2 K/uL  Auto Monocyte # : 1.4 K/uL  Auto Eosinophil # : 0.3 K/uL  Auto Basophil # : 0.0 K/uL  Auto Neutrophil % : 78.0 %  Auto Lymphocyte % : 9.3 %  Auto Monocyte % : 10.5 %  Auto Eosinophil % : 2.0 %  Auto Basophil % : 0.1 %    09-20    126<L>  |  90<L>  |  19  ----------------------------<  94  4.8   |  23  |  0.53  09-19    127<L>  |  90<L>  |  13  ----------------------------<  101<H>  4.7   |  23  |  0.46<L>    Ca    9.0      20 Sep 2017 04:11  Ca    8.7      19 Sep 2017 07:29    TPro  6.3  /  Alb  2.9<L>  /  TBili  0.4  /  DBili  x   /  AST  32  /  ALT  22  /  AlkPhos  86  09-19      proBNP:     TELEMETRY: 	  In the last 24 hours, there was normal sinus rhythm on telemetry, the heart rate varies between . There was occasional ventricular ectopy. 24H hour events:  No acute events in last 24 hours. Patient seen in chair, complains of sternotomy pain    MEDICATIONS:  aspirin enteric coated 81 milliGRAM(s) Oral daily  furosemide    Tablet 20 milliGRAM(s) Oral daily  lisinopril 5 milliGRAM(s) Oral daily  lisinopril 7.5 milliGRAM(s) Oral daily    heparin  Infusion 800 Unit(s)/Hr IV Continuous <Continuous>  HYDROmorphone   Tablet 2 milliGRAM(s) Oral every 3 hours PRN  HYDROmorphone   Tablet 4 milliGRAM(s) Oral every 3 hours PRN    docusate sodium 100 milliGRAM(s) Oral three times a day  polyethylene glycol 3350 17 Gram(s) Oral daily  pantoprazole    Tablet 40 milliGRAM(s) Oral before breakfast  bisacodyl Suppository 10 milliGRAM(s) Rectal daily PRN  sorbitol 70% Solution 30 milliLiter(s) Oral once    PHYSICAL EXAM:  T(C): 36.8 (09-20-17 @ 10:00), Max: 37.1 (09-19-17 @ 22:00)  HR: 95 (09-20-17 @ 10:00) (92 - 101)  BP: --  RR: 18 (09-20-17 @ 05:00) (18 - 18)  SpO2: 99% (09-20-17 @ 05:00) (95% - 99%)  Wt(kg): --  I&O's Summary    19 Sep 2017 07:01  -  20 Sep 2017 07:00  --------------------------------------------------------  IN: 290 mL / OUT: 1100 mL / NET: -810 mL    20 Sep 2017 07:01  -  20 Sep 2017 11:21  --------------------------------------------------------  IN: 300 mL / OUT: 0 mL / NET: 300 mL    Appearance: Normal	  HEENT:   Normal oral mucosa  Lymphatic: No lymphadenopathy  Cardiovascular: sternotomy well healed. mechanical VAD sounds.     No JVD,      No murmurs,      No edema  Respiratory: Lungs clear to auscultation	  Psychiatry: Mood & affect appropriate  Gastrointestinal:  Soft, Non-tender	  Skin: No rashes, No ecchymoses, No cyanosis	  Neurologic: Non-focal  Extremities: Normal range of motion, No clubbing, cyanosis   Vascular: warm extremities        LABS:	 	    CBC Full  -  ( 20 Sep 2017 04:11 )  WBC Count : 13.4 K/uL  Hemoglobin : 9.6 g/dL  Hematocrit : 27.2 %  Platelet Count - Automated : 215 K/uL  Mean Cell Volume : 98.4 fl  Mean Cell Hemoglobin : 34.8 pg  Mean Cell Hemoglobin Concentration : 35.3 gm/dL  Auto Neutrophil # : 10.4 K/uL  Auto Lymphocyte # : 1.2 K/uL  Auto Monocyte # : 1.4 K/uL  Auto Eosinophil # : 0.3 K/uL  Auto Basophil # : 0.0 K/uL  Auto Neutrophil % : 78.0 %  Auto Lymphocyte % : 9.3 %  Auto Monocyte % : 10.5 %  Auto Eosinophil % : 2.0 %  Auto Basophil % : 0.1 %    09-20    126<L>  |  90<L>  |  19  ----------------------------<  94  4.8   |  23  |  0.53  09-19    127<L>  |  90<L>  |  13  ----------------------------<  101<H>  4.7   |  23  |  0.46<L>    Ca    9.0      20 Sep 2017 04:11  Ca    8.7      19 Sep 2017 07:29    TPro  6.3  /  Alb  2.9<L>  /  TBili  0.4  /  DBili  x   /  AST  32  /  ALT  22  /  AlkPhos  86  09-19    TELEMETRY: 	  In the last 24 hours, there was normal sinus rhythm on telemetry, the heart rate varies between . There was occasional ventricular ectopy. 24H hour events:  No acute events in last 24 hours. Patient seen in chair, complains of sternotomy pain    MEDICATIONS:  aspirin enteric coated 81 milliGRAM(s) Oral daily  furosemide    Tablet 20 milliGRAM(s) Oral daily  lisinopril 5 milliGRAM(s) Oral daily  lisinopril 7.5 milliGRAM(s) Oral daily    heparin  Infusion 800 Unit(s)/Hr IV Continuous <Continuous>  HYDROmorphone   Tablet 2 milliGRAM(s) Oral every 3 hours PRN  HYDROmorphone   Tablet 4 milliGRAM(s) Oral every 3 hours PRN    docusate sodium 100 milliGRAM(s) Oral three times a day  polyethylene glycol 3350 17 Gram(s) Oral daily  pantoprazole    Tablet 40 milliGRAM(s) Oral before breakfast  bisacodyl Suppository 10 milliGRAM(s) Rectal daily PRN  sorbitol 70% Solution 30 milliLiter(s) Oral once    PHYSICAL EXAM:  T(C): 36.8 (09-20-17 @ 10:00), Max: 37.1 (09-19-17 @ 22:00)  HR: 95 (09-20-17 @ 10:00) (92 - 101)  BP: --  RR: 18 (09-20-17 @ 05:00) (18 - 18)  SpO2: 99% (09-20-17 @ 05:00) (95% - 99%)  Wt(kg): --  I&O's Summary    19 Sep 2017 07:01  -  20 Sep 2017 07:00  --------------------------------------------------------  IN: 290 mL / OUT: 1100 mL / NET: -810 mL    20 Sep 2017 07:01  -  20 Sep 2017 11:21  --------------------------------------------------------  IN: 300 mL / OUT: 0 mL / NET: 300 mL    Appearance: Normal	  HEENT:   Normal oral mucosa  Lymphatic: No lymphadenopathy  Cardiovascular: sternotomy well healed. mechanical VAD sounds.     No JVD,      No murmurs,      No edema  Respiratory: Lungs clear to auscultation	  Psychiatry: Mood & affect appropriate  Gastrointestinal:  Soft, Non-tender	  Skin: No rashes, No ecchymoses, No cyanosis. Drive line site clean, dry, and intact.   Neurologic: Non-focal  Extremities: Normal range of motion, No clubbing, cyanosis   Vascular: warm extremities    LVAD Interrogation: Heart Mate II  RPMs: 9000  Power: 4.9  Flow: 4.5  PI: 6.8  Event: None  No programming changes were made    LABS:	 	    CBC Full  -  ( 20 Sep 2017 04:11 )  WBC Count : 13.4 K/uL  Hemoglobin : 9.6 g/dL  Hematocrit : 27.2 %  Platelet Count - Automated : 215 K/uL  Mean Cell Volume : 98.4 fl  Mean Cell Hemoglobin : 34.8 pg  Mean Cell Hemoglobin Concentration : 35.3 gm/dL  Auto Neutrophil # : 10.4 K/uL  Auto Lymphocyte # : 1.2 K/uL  Auto Monocyte # : 1.4 K/uL  Auto Eosinophil # : 0.3 K/uL  Auto Basophil # : 0.0 K/uL  Auto Neutrophil % : 78.0 %  Auto Lymphocyte % : 9.3 %  Auto Monocyte % : 10.5 %  Auto Eosinophil % : 2.0 %  Auto Basophil % : 0.1 %    09-20    126<L>  |  90<L>  |  19  ----------------------------<  94  4.8   |  23  |  0.53  09-19    127<L>  |  90<L>  |  13  ----------------------------<  101<H>  4.7   |  23  |  0.46<L>    Ca    9.0      20 Sep 2017 04:11  Ca    8.7      19 Sep 2017 07:29    TPro  6.3  /  Alb  2.9<L>  /  TBili  0.4  /  DBili  x   /  AST  32  /  ALT  22  /  AlkPhos  86  09-19    TELEMETRY: 	  In the last 24 hours, there was normal sinus rhythm on telemetry, the heart rate varies between . There was occasional ventricular ectopy.

## 2017-09-20 NOTE — DISCHARGE NOTE ADULT - INSTRUCTIONS
Report signs and symptoms of distress, such as chest pain, shortness of breath  to your health-care provider promptly; if necessary call 911. Take medications as instructed. Keep follow-up appointments as scheduled. Regular diet - low fat, low cholesterol, no added salt.

## 2017-09-20 NOTE — PROGRESS NOTE ADULT - PROBLEM SELECTOR PLAN 1
euvolemic , Continue current dose of diuretics.   Discontinue hydralazine  MAP 70s   cont lisinopril  7.5 mg daily (please dc order for 5mg) euvolemic , Continue current dose of diuretics.   MAP 70s   cont lisinopril  7.5 mg daily (please dc order for 5mg)

## 2017-09-20 NOTE — DISCHARGE NOTE ADULT - CARE PROVIDER_API CALL
Isma Cunningham (MD), Surgery; Thoracic and Cardiac Surgery  [address] [place] [address]  Phone: [phone]  Fax: [phone]    Tamanna Mcclendon (NP; RN), NP in Adult Health  [address] [place] [address]  Phone: [phone]  Fax: [phone]    Christiano Iyer (MD; MPH), Adv Heart Fail Trnsplnt Cardio; Cardiology; Internal Medicine  [address] [place] [address]  Phone: [phone]  Fax: [phone] Isma Cunningham), Surgery; Thoracic and Cardiac Surgery  [address] [place] [address]  Phone: [phone]  Fax: [phone]    Tamanna Mcclendon (NP; RN), NP in Adult Health  [address] [place] [address]  Phone: [phone]  Fax: [phone]    Christiano Iyer (MD; MPH), Adv Heart Fail Trnsplnt Cardio; Cardiology; Internal Medicine  [address] [place] [address]  Phone: [phone]  Fax: [phone]    Pavan Barone), EndocrinologyMetabDiabetes; Internal Medicine  [address] [place] [address]  Phone: [phone]  Fax: [phone]

## 2017-09-20 NOTE — DISCHARGE NOTE ADULT - HOME CARE AGENCY
[facility] [phone] for RN assessment and P/T eval, start of care the day after discharge [facility] [phone] Visiting nurse to call to arrange home care visit for start of care the day after discharge. Please call home care agency with any questions or concerns.

## 2017-09-20 NOTE — PROGRESS NOTE ADULT - SUBJECTIVE AND OBJECTIVE BOX
Patient is a 62y old  Male who presents with a chief complaint of SOOB (25 Aug 2017 22:27)    complaining of pain in abdomen and chest   denies any SOB , but has difficulty in taking deep breaths secondary to pain.   Any change in ROS:     MEDICATIONS  (STANDING):  aspirin enteric coated 81 milliGRAM(s) Oral daily  docusate sodium 100 milliGRAM(s) Oral three times a day  polyethylene glycol 3350 17 Gram(s) Oral daily  pantoprazole    Tablet 40 milliGRAM(s) Oral before breakfast  furosemide    Tablet 20 milliGRAM(s) Oral daily  lisinopril 5 milliGRAM(s) Oral daily  sorbitol 70% Solution 30 milliLiter(s) Oral once  lisinopril 2.5 milliGRAM(s) Oral once  lisinopril 7.5 milliGRAM(s) Oral daily  heparin  Infusion 800 Unit(s)/Hr (8 mL/Hr) IV Continuous <Continuous>    MEDICATIONS  (PRN):  HYDROmorphone   Tablet 2 milliGRAM(s) Oral every 3 hours PRN Moderate Pain (4 - 6)  HYDROmorphone   Tablet 4 milliGRAM(s) Oral every 3 hours PRN Severe Pain (7 - 10)  bisacodyl Suppository 10 milliGRAM(s) Rectal daily PRN Constipation    Vital Signs Last 24 Hrs  T(C): 37 (20 Sep 2017 05:00), Max: 37.1 (19 Sep 2017 22:00)  T(F): 98.6 (20 Sep 2017 05:00), Max: 98.7 (19 Sep 2017 22:00)  HR: 92 (20 Sep 2017 05:00) (92 - 101)  BP: --  BP(mean): 80 (20 Sep 2017 05:00) (64 - 84)  RR: 18 (20 Sep 2017 05:00) (18 - 18)  SpO2: 99% (20 Sep 2017 05:00) (95% - 99%)    I&O's Summary    19 Sep 2017 07:01  -  20 Sep 2017 07:00  --------------------------------------------------------  IN: 290 mL / OUT: 1100 mL / NET: -810 mL    20 Sep 2017 07:01  -  20 Sep 2017 10:32  --------------------------------------------------------  IN: 300 mL / OUT: 0 mL / NET: 300 mL          Physical Exam:   GENERAL: cachectic  HEENT: ALONSO/   Atraumatic, Normocephalic  ENMT: No tonsillar erythema, exudates, or enlargement; Moist mucous membranes, Good dentition, No lesions  NECK: Supple, No JVD, Normal thyroid  CHEST/LUNG: Clear to auscultaion, ; No rales, rhonchi, wheezing, or rubs  CVS: Regular rate and rhythm; No murmurs, rubs, or gallops  GI: : Soft, Nontender, Nondistended; Bowel sounds present  NERVOUS SYSTEM:  Alert & Oriented X3  EXTREMITIES:  2+ Peripheral Pulses, No clubbing, cyanosis, or edema  LYMPH: No lymphadenopathy noted  SKIN: No rashes or lesions  ENDOCRINOLOGY: No Thyromegaly  PSYCH: Appropriate    Labs:  28, 21, 36, 40, 40                            9.6    13.4  )-----------( 215      ( 20 Sep 2017 04:11 )             27.2                         8.8    14.1  )-----------( 162      ( 19 Sep 2017 05:09 )             26.3                         9.0    12.9  )-----------( 123      ( 18 Sep 2017 02:39 )             25.9                         9.5    11.2  )-----------( 113      ( 17 Sep 2017 01:28 )             29.0     09-20    126<L>  |  90<L>  |  19  ----------------------------<  94  4.8   |  23  |  0.53  09-19    127<L>  |  90<L>  |  13  ----------------------------<  101<H>  4.7   |  23  |  0.46<L>  09-18    126<L>  |  91<L>  |  17  ----------------------------<  158<H>  4.4   |  27  |  0.59  09-17    130<L>  |  90<L>  |  17  ----------------------------<  113<H>  4.4   |  30  |  0.59    Ca    9.0      20 Sep 2017 04:11  Ca    8.7      19 Sep 2017 07:29    TPro  6.3  /  Alb  2.9<L>  /  TBili  0.4  /  DBili  x   /  AST  32  /  ALT  22  /  AlkPhos  86  09-19  TPro  6.0  /  Alb  2.8<L>  /  TBili  0.4  /  DBili  x   /  AST  25  /  ALT  27  /  AlkPhos  85  09-18  TPro  6.3  /  Alb  3.1<L>  /  TBili  0.4  /  DBili  x   /  AST  36  /  ALT  32  /  AlkPhos  91  09-17    CAPILLARY BLOOD GLUCOSE          LIVER FUNCTIONS - ( 19 Sep 2017 07:29 )  Alb: 2.9 g/dL / Pro: 6.3 g/dL / ALK PHOS: 86 U/L / ALT: 22 U/L / AST: 32 U/L / GGT: x           PT/INR - ( 20 Sep 2017 04:11 )   PT: 19.8 sec;   INR: 1.79 ratio             Cultures:       < from: Xray Chest 1 View AP- PORTABLE-Urgent (09.19.17 @ 13:04) >  EXAM:  CHEST-PORTABLE URGENT                            PROCEDURE DATE:  09/19/2017            INTERPRETATION:  CLINICAL INFORMATION: LVAD, left chest tube removed.    EXAM: Frontal radiograph of the chest from 9/19/2017.    COMPARISON: Chest radiograph from 9/18/2017.    FINDINGS:  Left ventricular assist device, partially imaged. Status post median   sternotomy and mitral valve repair. Left lower lung opacity, unchanged.   The lungs are otherwise clear. Heart size cannot accurately be assessed   in this projection. No pneumothorax.    IMPRESSION: Left lower lung opacity may represent a small left pleural   effusion and associated atelectasis, unchanged.                RAFIA WELSH M.D., RADIOLOGY RESIDENT  This document has been electronically signed.  ISAI CONLEY M.D. ATTENDING RADIOLOGIST  This document has been electronically signed. Sep 19 2017  4:50PM    < end of copied text >                        Studies  Chest X-RAY  CT SCAN Chest   Venous Dopplers: LE:   CT Abdomen  Others

## 2017-09-21 LAB
ANION GAP SERPL CALC-SCNC: 15 MMOL/L — SIGNIFICANT CHANGE UP (ref 5–17)
APTT BLD: 46.1 SEC — HIGH (ref 27.5–37.4)
APTT BLD: 47.5 SEC — HIGH (ref 27.5–37.4)
APTT BLD: 52.9 SEC — HIGH (ref 27.5–37.4)
APTT BLD: 62.9 SEC — HIGH (ref 27.5–37.4)
BASOPHILS # BLD AUTO: 0 K/UL — SIGNIFICANT CHANGE UP (ref 0–0.2)
BASOPHILS NFR BLD AUTO: 0.3 % — SIGNIFICANT CHANGE UP (ref 0–2)
BUN SERPL-MCNC: 18 MG/DL — SIGNIFICANT CHANGE UP (ref 7–23)
CALCIUM SERPL-MCNC: 9 MG/DL — SIGNIFICANT CHANGE UP (ref 8.4–10.5)
CHLORIDE SERPL-SCNC: 89 MMOL/L — LOW (ref 96–108)
CO2 SERPL-SCNC: 24 MMOL/L — SIGNIFICANT CHANGE UP (ref 22–31)
CREAT SERPL-MCNC: 0.5 MG/DL — SIGNIFICANT CHANGE UP (ref 0.5–1.3)
EOSINOPHIL # BLD AUTO: 0.3 K/UL — SIGNIFICANT CHANGE UP (ref 0–0.5)
EOSINOPHIL NFR BLD AUTO: 2.4 % — SIGNIFICANT CHANGE UP (ref 0–6)
GLUCOSE SERPL-MCNC: 208 MG/DL — HIGH (ref 70–99)
HCT VFR BLD CALC: 26.7 % — LOW (ref 39–50)
HCT VFR BLD CALC: 27.8 % — LOW (ref 39–50)
HGB BLD-MCNC: 9.1 G/DL — LOW (ref 13–17)
HGB BLD-MCNC: 9.4 G/DL — LOW (ref 13–17)
INR BLD: 1.71 RATIO — HIGH (ref 0.88–1.16)
LDH SERPL L TO P-CCNC: 373 U/L — HIGH (ref 50–242)
LYMPHOCYTES # BLD AUTO: 1.4 K/UL — SIGNIFICANT CHANGE UP (ref 1–3.3)
LYMPHOCYTES # BLD AUTO: 11.1 % — LOW (ref 13–44)
MCHC RBC-ENTMCNC: 33 PG — SIGNIFICANT CHANGE UP (ref 27–34)
MCHC RBC-ENTMCNC: 33.4 PG — SIGNIFICANT CHANGE UP (ref 27–34)
MCHC RBC-ENTMCNC: 33.9 GM/DL — SIGNIFICANT CHANGE UP (ref 32–36)
MCHC RBC-ENTMCNC: 34.1 GM/DL — SIGNIFICANT CHANGE UP (ref 32–36)
MCV RBC AUTO: 97.3 FL — SIGNIFICANT CHANGE UP (ref 80–100)
MCV RBC AUTO: 97.9 FL — SIGNIFICANT CHANGE UP (ref 80–100)
MONOCYTES # BLD AUTO: 1.3 K/UL — HIGH (ref 0–0.9)
MONOCYTES NFR BLD AUTO: 10 % — SIGNIFICANT CHANGE UP (ref 2–14)
NEUTROPHILS # BLD AUTO: 9.8 K/UL — HIGH (ref 1.8–7.4)
NEUTROPHILS NFR BLD AUTO: 76.2 % — SIGNIFICANT CHANGE UP (ref 43–77)
PLATELET # BLD AUTO: 273 K/UL — SIGNIFICANT CHANGE UP (ref 150–400)
PLATELET # BLD AUTO: 329 K/UL — SIGNIFICANT CHANGE UP (ref 150–400)
POTASSIUM SERPL-MCNC: 4.3 MMOL/L — SIGNIFICANT CHANGE UP (ref 3.5–5.3)
POTASSIUM SERPL-SCNC: 4.3 MMOL/L — SIGNIFICANT CHANGE UP (ref 3.5–5.3)
PROTHROM AB SERPL-ACNC: 18.9 SEC — HIGH (ref 9.8–12.7)
RBC # BLD: 2.73 M/UL — LOW (ref 4.2–5.8)
RBC # BLD: 2.86 M/UL — LOW (ref 4.2–5.8)
RBC # FLD: 13.6 % — SIGNIFICANT CHANGE UP (ref 10.3–14.5)
RBC # FLD: 13.7 % — SIGNIFICANT CHANGE UP (ref 10.3–14.5)
SODIUM SERPL-SCNC: 128 MMOL/L — LOW (ref 135–145)
WBC # BLD: 12.8 K/UL — HIGH (ref 3.8–10.5)
WBC # BLD: 16.4 K/UL — HIGH (ref 3.8–10.5)
WBC # FLD AUTO: 12.8 K/UL — HIGH (ref 3.8–10.5)
WBC # FLD AUTO: 16.4 K/UL — HIGH (ref 3.8–10.5)

## 2017-09-21 PROCEDURE — 99233 SBSQ HOSP IP/OBS HIGH 50: CPT | Mod: 25,GC

## 2017-09-21 PROCEDURE — 93750 INTERROGATION VAD IN PERSON: CPT

## 2017-09-21 RX ORDER — WARFARIN SODIUM 2.5 MG/1
5 TABLET ORAL ONCE
Qty: 0 | Refills: 0 | Status: COMPLETED | OUTPATIENT
Start: 2017-09-21 | End: 2017-09-21

## 2017-09-21 RX ADMIN — WARFARIN SODIUM 5 MILLIGRAM(S): 2.5 TABLET ORAL at 21:57

## 2017-09-21 RX ADMIN — POLYETHYLENE GLYCOL 3350 17 GRAM(S): 17 POWDER, FOR SOLUTION ORAL at 12:25

## 2017-09-21 RX ADMIN — Medication 81 MILLIGRAM(S): at 12:25

## 2017-09-21 RX ADMIN — LISINOPRIL 7.5 MILLIGRAM(S): 2.5 TABLET ORAL at 05:32

## 2017-09-21 RX ADMIN — Medication 100 MILLIGRAM(S): at 21:57

## 2017-09-21 RX ADMIN — HYDROMORPHONE HYDROCHLORIDE 4 MILLIGRAM(S): 2 INJECTION INTRAMUSCULAR; INTRAVENOUS; SUBCUTANEOUS at 09:00

## 2017-09-21 RX ADMIN — HYDROMORPHONE HYDROCHLORIDE 4 MILLIGRAM(S): 2 INJECTION INTRAMUSCULAR; INTRAVENOUS; SUBCUTANEOUS at 14:14

## 2017-09-21 RX ADMIN — PANTOPRAZOLE SODIUM 40 MILLIGRAM(S): 20 TABLET, DELAYED RELEASE ORAL at 05:33

## 2017-09-21 RX ADMIN — HEPARIN SODIUM 11 UNIT(S)/HR: 5000 INJECTION INTRAVENOUS; SUBCUTANEOUS at 12:26

## 2017-09-21 RX ADMIN — Medication 30 MILLILITER(S): at 14:27

## 2017-09-21 RX ADMIN — HYDROMORPHONE HYDROCHLORIDE 4 MILLIGRAM(S): 2 INJECTION INTRAMUSCULAR; INTRAVENOUS; SUBCUTANEOUS at 00:30

## 2017-09-21 RX ADMIN — HEPARIN SODIUM 12 UNIT(S)/HR: 5000 INJECTION INTRAVENOUS; SUBCUTANEOUS at 17:48

## 2017-09-21 RX ADMIN — HYDROMORPHONE HYDROCHLORIDE 4 MILLIGRAM(S): 2 INJECTION INTRAMUSCULAR; INTRAVENOUS; SUBCUTANEOUS at 14:48

## 2017-09-21 RX ADMIN — Medication 100 MILLIGRAM(S): at 16:16

## 2017-09-21 RX ADMIN — HEPARIN SODIUM 10 UNIT(S)/HR: 5000 INJECTION INTRAVENOUS; SUBCUTANEOUS at 04:30

## 2017-09-21 RX ADMIN — Medication 100 MILLIGRAM(S): at 05:32

## 2017-09-21 RX ADMIN — HYDROMORPHONE HYDROCHLORIDE 4 MILLIGRAM(S): 2 INJECTION INTRAMUSCULAR; INTRAVENOUS; SUBCUTANEOUS at 08:25

## 2017-09-21 NOTE — PROGRESS NOTE ADULT - PROBLEM SELECTOR PLAN 1
doing ok: can restart spiriva 1 puff once a day !!rosalia NP on the floor   Chest x-ray from yesterday reviewed: Doubt pneumonia: seems to be more like atelectasis: Continue incentive spirometry!!  stable

## 2017-09-21 NOTE — PROGRESS NOTE ADULT - PROBLEM SELECTOR PLAN 3
INR 1.7 today  Coumadin with heparin bridge  Goal 2-2.5  Continue aspirin 81 mg daily  heparin bridge

## 2017-09-21 NOTE — PROGRESS NOTE ADULT - SUBJECTIVE AND OBJECTIVE BOX
Patient is a 62y old  Male who presents with a chief complaint of SOOB (25 Aug 2017 22:27)      SUBJECTIVE / OVERNIGHT EVENTS:  Chest tube has been removed. No worsening of SOB.  Review of Systems:   CONSTITUTIONAL: No fever, weight loss, Fatigue +  EYES: No eye pain, visual disturbances, or discharge  ENMT:  No difficulty hearing, tinnitus, vertigo; No sinus or throat pain  NECK: No pain or stiffness  BREASTS: No pain, masses, or nipple discharge  RESPIRATORY: No cough, wheezing, chills or hemoptysis; No shortness of breath  CARDIOVASCULAR: No chest pain, palpitations, dizziness, or leg swelling  GASTROINTESTINAL: No abdominal or epigastric pain. No nausea, vomiting, or hematemesis; No diarrhea or constipation. No melena or hematochezia.  GENITOURINARY: No dysuria, frequency, hematuria, or incontinence  NEUROLOGICAL: No headaches, memory loss, loss of strength, numbness, or tremors  SKIN: No itching, burning, rashes, or lesions   LYMPH NODES: No enlarged glands  ENDOCRINE: No heat or cold intolerance; No hair loss  MUSCULOSKELETAL: No joint pain or swelling; No muscle, back, or extremity pain  PSYCHIATRIC: No depression, anxiety, mood swings, or difficulty sleeping  HEME/LYMPH: No easy bruising, or bleeding gums  ALLERY AND IMMUNOLOGIC: No hives or eczema    MEDICATIONS  (STANDING):  aspirin enteric coated 81 milliGRAM(s) Oral daily  docusate sodium 100 milliGRAM(s) Oral three times a day  polyethylene glycol 3350 17 Gram(s) Oral daily  pantoprazole    Tablet 40 milliGRAM(s) Oral before breakfast  sorbitol 70% Solution 30 milliLiter(s) Oral once  lisinopril 7.5 milliGRAM(s) Oral daily  heparin  Infusion 900 Unit(s)/Hr (10 mL/Hr) IV Continuous <Continuous>    MEDICATIONS  (PRN):  HYDROmorphone   Tablet 2 milliGRAM(s) Oral every 3 hours PRN Moderate Pain (4 - 6)  HYDROmorphone   Tablet 4 milliGRAM(s) Oral every 3 hours PRN Severe Pain (7 - 10)  bisacodyl Suppository 10 milliGRAM(s) Rectal daily PRN Constipation      PHYSICAL EXAM:  Vital Signs Last 24 Hrs  T(C): 36.6 (21 Sep 2017 08:00), Max: 36.9 (20 Sep 2017 14:00)  T(F): 97.8 (21 Sep 2017 08:00), Max: 98.4 (20 Sep 2017 14:00)  HR: 92 (21 Sep 2017 08:00) (92 - 96)  BP: --  BP(mean): 80 (21 Sep 2017 05:00) (68 - 82)  RR: 16 (21 Sep 2017 08:00) (16 - 18)  SpO2: 99% (21 Sep 2017 08:00) (98% - 100%)  I&O's Summary    20 Sep 2017 07:01  -  21 Sep 2017 07:00  --------------------------------------------------------  IN: 854 mL / OUT: 700 mL / NET: 154 mL    21 Sep 2017 07:01  -  21 Sep 2017 11:25  --------------------------------------------------------  IN: 240 mL / OUT: 0 mL / NET: 240 mL      GENERAL: NAD, well-developed  HEAD:  Atraumatic, Normocephalic  EYES: EOMI, PERRLA, conjunctiva and sclera clear  NECK: Supple, No JVD  CHEST/LUNG: Clear to auscultation bilaterally; No wheeze  HEART: Regular rate and rhythm; No murmurs, rubs, or gallops  ABDOMEN: Soft, Nontender, Nondistended; Bowel sounds present  EXTREMITIES:  2+ Peripheral Pulses, No clubbing, cyanosis, or edema  PSYCH: AAOx3  NEUROLOGY: non-focal  SKIN: No rashes or lesions    LABS:  CAPILLARY BLOOD GLUCOSE                              9.1    12.8  )-----------( 273      ( 21 Sep 2017 03:00 )             26.7     09-21    128<L>  |  89<L>  |  18  ----------------------------<  208<H>  4.3   |  24  |  0.50    Ca    9.0      21 Sep 2017 03:00      PT/INR - ( 21 Sep 2017 03:00 )   PT: 18.9 sec;   INR: 1.71 ratio         PTT - ( 21 Sep 2017 10:25 )  PTT:52.9 sec          RADIOLOGY & ADDITIONAL TESTS:    Imaging Personally Reviewed:    Consultant(s) Notes Reviewed:      Care Discussed with Consultants/Other Providers:

## 2017-09-21 NOTE — PROGRESS NOTE ADULT - ASSESSMENT
62M NICM  (LVDD 6.7 cm) HFrEF, (LVEF 10%), , pw abd pain, elevated lactic acid. Initial RHC with depressed CI (0.6).  Now s/p LVAD BTR.   ACC/AHA stage D with NYHA class IV symptoms    continues to do well hemodynamically. He complains of sternotomy pain. 62M NICM  (LVDD 6.7 cm) HFrEF, (LVEF 10%), , pw abd pain, elevated lactic acid. Initial RHC with depressed CI (0.6).  Now s/p LVAD with the hopes of future recover. ACC/AHA stage D.     continues to do well hemodynamically. He complains of sternotomy pain.

## 2017-09-21 NOTE — PROGRESS NOTE ADULT - SUBJECTIVE AND OBJECTIVE BOX
Patient is a 62y old  Male who presents with a chief complaint of SOOB (25 Aug 2017 22:27)  stable   'no resp events overnight     Any change in ROS:     MEDICATIONS  (STANDING):  aspirin enteric coated 81 milliGRAM(s) Oral daily  docusate sodium 100 milliGRAM(s) Oral three times a day  polyethylene glycol 3350 17 Gram(s) Oral daily  pantoprazole    Tablet 40 milliGRAM(s) Oral before breakfast  sorbitol 70% Solution 30 milliLiter(s) Oral once  lisinopril 7.5 milliGRAM(s) Oral daily  heparin  Infusion 900 Unit(s)/Hr (10 mL/Hr) IV Continuous <Continuous>    MEDICATIONS  (PRN):  HYDROmorphone   Tablet 2 milliGRAM(s) Oral every 3 hours PRN Moderate Pain (4 - 6)  HYDROmorphone   Tablet 4 milliGRAM(s) Oral every 3 hours PRN Severe Pain (7 - 10)  bisacodyl Suppository 10 milliGRAM(s) Rectal daily PRN Constipation    Vital Signs Last 24 Hrs  T(C): 36.6 (21 Sep 2017 08:00), Max: 36.9 (20 Sep 2017 14:00)  T(F): 97.8 (21 Sep 2017 08:00), Max: 98.4 (20 Sep 2017 14:00)  HR: 92 (21 Sep 2017 08:00) (92 - 96)  BP: --  BP(mean): 80 (21 Sep 2017 05:00) (68 - 82)  RR: 16 (21 Sep 2017 08:00) (16 - 18)  SpO2: 99% (21 Sep 2017 08:00) (98% - 100%)    I&O's Summary    20 Sep 2017 07:01  -  21 Sep 2017 07:00  --------------------------------------------------------  IN: 854 mL / OUT: 700 mL / NET: 154 mL    21 Sep 2017 07:01  -  21 Sep 2017 11:54  --------------------------------------------------------  IN: 240 mL / OUT: 0 mL / NET: 240 mL          Physical Exam:   GENERAL: cachectic  HEENT: ALONSO/   Atraumatic, Normocephalic  ENMT: No tonsillar erythema, exudates, or enlargement; Moist mucous membranes, Good dentition, No lesions  NECK: Supple, No JVD, Normal thyroid  CHEST/LUNG: Clear to auscultaion, ; No rales, rhonchi, wheezing, or rubs  CVS: Regular rate and rhythm; No murmurs, rubs, or gallops  GI: : Soft, Nontender, Nondistended; Bowel sounds present  NERVOUS SYSTEM:  Alert & Oriented X3  EXTREMITIES:  2+ Peripheral Pulses, No clubbing, cyanosis, or edema  LYMPH: No lymphadenopathy noted  SKIN: No rashes or lesions  ENDOCRINOLOGY: No Thyromegaly  PSYCH: Appropriate    Labs:  28, 21                            9.1    12.8  )-----------( 273      ( 21 Sep 2017 03:00 )             26.7                         9.6    13.4  )-----------( 215      ( 20 Sep 2017 04:11 )             27.2                         8.8    14.1  )-----------( 162      ( 19 Sep 2017 05:09 )             26.3                         9.0    12.9  )-----------( 123      ( 18 Sep 2017 02:39 )             25.9     09-21    128<L>  |  89<L>  |  18  ----------------------------<  208<H>  4.3   |  24  |  0.50  09-20    126<L>  |  90<L>  |  19  ----------------------------<  94  4.8   |  23  |  0.53  09-19    127<L>  |  90<L>  |  13  ----------------------------<  101<H>  4.7   |  23  |  0.46<L>  09-18    126<L>  |  91<L>  |  17  ----------------------------<  158<H>  4.4   |  27  |  0.59    Ca    9.0      21 Sep 2017 03:00  Ca    9.0      20 Sep 2017 04:11    TPro  6.3  /  Alb  2.9<L>  /  TBili  0.4  /  DBili  x   /  AST  32  /  ALT  22  /  AlkPhos  86  09-19  TPro  6.0  /  Alb  2.8<L>  /  TBili  0.4  /  DBili  x   /  AST  25  /  ALT  27  /  AlkPhos  85  09-18    CAPILLARY BLOOD GLUCOSE            PT/INR - ( 21 Sep 2017 03:00 )   PT: 18.9 sec;   INR: 1.71 ratio         PTT - ( 21 Sep 2017 10:25 )  PTT:52.9 sec    Cultures:       < from: Xray Chest 1 View AP- PORTABLE-Urgent (09.19.17 @ 13:04) >    EXAM:  CHEST-PORTABLE URGENT                            PROCEDURE DATE:  09/19/2017            INTERPRETATION:  CLINICAL INFORMATION: LVAD, left chest tube removed.    EXAM: Frontal radiograph of the chest from 9/19/2017.    COMPARISON: Chest radiograph from 9/18/2017.    FINDINGS:  Left ventricular assist device, partially imaged. Status post median   sternotomy and mitral valve repair. Left lower lung opacity, unchanged.   The lungs are otherwise clear. Heart size cannot accurately be assessed   in this projection. No pneumothorax.    IMPRESSION: Left lower lung opacity may represent a small left pleural   effusion and associated atelectasis, unchanged.                RAFIA WELSH M.D., RADIOLOGY RESIDENT    < end of copied text >                        Studies  Chest X-RAY  CT SCAN Chest   Venous Dopplers: LE:   CT Abdomen  Others

## 2017-09-21 NOTE — CHART NOTE - NSCHARTNOTEFT_GEN_A_CORE
Source: Patient [X ]    Family [ ]     other [ X] RN, medical record, NP,     Pt seen for malnutrition follow up. Pt see and c/o pain and being tired. Pt reports a fair PO intake and prefers brought in from home. Pt drinks ensure enlive atleat 1 time daily and denies to offer food preferences or accept menu selection assistance. Per RN pt ate eggs and cereal for breakfast today. Pt has limited interests in HF or coumadin education, states "give it to Celestina". RD encouraged coumadin/vitamin K drug interaction education, however Pt has poor concentration, therefore limited education provided at this time. Pt is aware that RD will continue to follow up regarding nutrition therapy education.     Per chart, Pt with acute systolic decompensated HF with EF of 10%, cardiogenic shock, and low output. S/p Heart Mate ll LVAD implant and TV repair on 9/12.    Diet : DASH, Ensure enlive x2       Patient reports no GI distress      PO intake:  50% of meals     Source for PO intake; family and chart     Current Weight: 58.4kg, admission Wt: 66.kg, Wt trending down, likely related to fluid loss vs poor PO intake,   % Weight Change    Pertinent Medications: MEDICATIONS  (STANDING):  aspirin enteric coated 81 milliGRAM(s) Oral daily  docusate sodium 100 milliGRAM(s) Oral three times a day  polyethylene glycol 3350 17 Gram(s) Oral daily  pantoprazole    Tablet 40 milliGRAM(s) Oral before breakfast  lisinopril 7.5 milliGRAM(s) Oral daily  heparin  Infusion 900 Unit(s)/Hr (11 mL/Hr) IV Continuous <Continuous>  warfarin 5 milliGRAM(s) Oral once    MEDICATIONS  (PRN):  HYDROmorphone   Tablet 2 milliGRAM(s) Oral every 3 hours PRN Moderate Pain (4 - 6)  HYDROmorphone   Tablet 4 milliGRAM(s) Oral every 3 hours PRN Severe Pain (7 - 10)  bisacodyl Suppository 10 milliGRAM(s) Rectal daily PRN Constipation    Pertinent Labs:  Hgb/Hct:9.1/26.7-low, Na:128-low, K:4.3, Cl:89, BUN:18, Cr:0.50, Glucose:208-high, Ca:9.0, Total Protein:6.3, Albumin:2.9 Total Bilirubin:0.4, AlkPhos:86, AST:32, ALT:22,       Skin: no pressure ulcers, no edema     Estimated Needs:   [X ] no change since previous assessment  [ ] recalculated:       Previous Nutrition Diagnosis:   [X ] Increased Nutrient Needs: addressed with PO supplements   [X ] Food & Nutrition Related Knowledge Deficit: Pt slowly accepting diet education   [ X] Malnutrition: addressed with supplements        Nutrition Diagnosis is [X]ongoing  [ ] resolved [ ] not applicable     New Nutrition Diagnosis: [ X] not applicable      Recommend    1. Recommend to liberalize diet to Low Na, Ensure enlive x2  2. Continue to reinforce coumadin and HF diet education   3. Provide food preferences within diet restrictions   4. Encourage adequate PO intake        Monitoring and Evaluation:     [ X] PO intake [X ] Tolerance to diet prescription [X ] weights [ X] follow up per protocol    [X ] other: RD remains available Sarah Siegler RD. Pager #[phone]

## 2017-09-21 NOTE — PROGRESS NOTE ADULT - ASSESSMENT
61 yo male with no PMHx presented with sob and abdominal pain  on 8/25/17.  Cta - for PE,  cardiac mRI and cath revealed   lv dysfunction and MR.  Cardiology was consulted, HF service consulted and the pt was placed in ccu with pulmonary edema. He had  low cardiac output and cardiogenic shock, and was  referred  for LVAD evaluation. ID was consulted to clear the patient for surgery.. GI was following for abd pain, no significant findings on virtual colonoscopy. He was treated for chf  with  afterload reduction  and milrinone and stabilized, euvolemic .  9/12/17 S/P Implantation of HeartMate II left ventricular assist device through median sternotomy, and repair of the tricuspid valve with a number 28 classic Christianson ring.  Post op he required inotropic support and PRBc's for acute blood loss anemia  He was transferred to sdu  9/18  He has c/o pain at the incision site and chest tube site  Chest tube to be d/c  9/20  Pain improved .  INR drifted to 1.8, Heparin started.  Lisinopril increased for BP and afterload

## 2017-09-21 NOTE — PROGRESS NOTE ADULT - SUBJECTIVE AND OBJECTIVE BOX
24H hour events:  No interval change, complains of sternotomy site pain.    MEDICATIONS:  aspirin enteric coated 81 milliGRAM(s) Oral daily  lisinopril 7.5 milliGRAM(s) Oral daily  heparin  Infusion 900 Unit(s)/Hr IV Continuous <Continuous>        HYDROmorphone   Tablet 2 milliGRAM(s) Oral every 3 hours PRN  HYDROmorphone   Tablet 4 milliGRAM(s) Oral every 3 hours PRN    docusate sodium 100 milliGRAM(s) Oral three times a day  polyethylene glycol 3350 17 Gram(s) Oral daily  pantoprazole    Tablet 40 milliGRAM(s) Oral before breakfast  bisacodyl Suppository 10 milliGRAM(s) Rectal daily PRN  sorbitol 70% Solution 30 milliLiter(s) Oral once            PHYSICAL EXAM:  T(C): 36.6 (09-21-17 @ 08:00), Max: 36.9 (09-20-17 @ 14:00)  HR: 92 (09-21-17 @ 08:00) (92 - 96)  BP: --  RR: 16 (09-21-17 @ 08:00) (16 - 18)  SpO2: 99% (09-21-17 @ 08:00) (98% - 100%)  Wt(kg): --  I&O's Summary    20 Sep 2017 07:01  -  21 Sep 2017 07:00  --------------------------------------------------------  IN: 854 mL / OUT: 700 mL / NET: 154 mL    21 Sep 2017 07:01  -  21 Sep 2017 11:08  --------------------------------------------------------  IN: 240 mL / OUT: 0 mL / NET: 240 mL        Appearance: Normal	  HEENT:   Normal oral mucosa  Lymphatic: No lymphadenopathy  Cardiovascular: sternotomy bandaged, no erythema. JVP not visible while sitting upright.  No murmurs. Mechanical LVAD sound is heard.  Drive line site clear dry and intact.   Respiratory: Lungs clear to auscultation	  Psychiatry: Mood & affect appropriate  Gastrointestinal:  Soft, Non-tender	  Skin: No rashes, No ecchymoses, No cyanosis	  Neurologic: Non-focal  Extremities: Normal range of motion, No clubbing, cyanosis   Vascular: warm extremities        LABS:	 	    CBC Full  -  ( 21 Sep 2017 03:00 )  WBC Count : 12.8 K/uL  Hemoglobin : 9.1 g/dL  Hematocrit : 26.7 %  Platelet Count - Automated : 273 K/uL  Mean Cell Volume : 97.9 fl  Mean Cell Hemoglobin : 33.4 pg  Mean Cell Hemoglobin Concentration : 34.1 gm/dL  Auto Neutrophil # : 9.8 K/uL  Auto Lymphocyte # : 1.4 K/uL  Auto Monocyte # : 1.3 K/uL  Auto Eosinophil # : 0.3 K/uL  Auto Basophil # : 0.0 K/uL  Auto Neutrophil % : 76.2 %  Auto Lymphocyte % : 11.1 %  Auto Monocyte % : 10.0 %  Auto Eosinophil % : 2.4 %  Auto Basophil % : 0.3 %    09-21    128<L>  |  89<L>  |  18  ----------------------------<  208<H>  4.3   |  24  |  0.50  09-20    126<L>  |  90<L>  |  19  ----------------------------<  94  4.8   |  23  |  0.53    Ca    9.0      21 Sep 2017 03:00  Ca    9.0      20 Sep 2017 04:11        proBNP:   LVAD Interrogation: Heart Mate II  RPMs: 9000  Power: 4.9  Flow: 4.5  PI: 6.8  Event: None  No programming changes were made 24H hour events:  No interval change, complains of sternotomy site pain.    MEDICATIONS:  aspirin enteric coated 81 milliGRAM(s) Oral daily  lisinopril 7.5 milliGRAM(s) Oral daily  heparin  Infusion 900 Unit(s)/Hr IV Continuous <Continuous>    HYDROmorphone   Tablet 2 milliGRAM(s) Oral every 3 hours PRN  HYDROmorphone   Tablet 4 milliGRAM(s) Oral every 3 hours PRN    docusate sodium 100 milliGRAM(s) Oral three times a day  polyethylene glycol 3350 17 Gram(s) Oral daily  pantoprazole    Tablet 40 milliGRAM(s) Oral before breakfast  bisacodyl Suppository 10 milliGRAM(s) Rectal daily PRN  sorbitol 70% Solution 30 milliLiter(s) Oral once      PHYSICAL EXAM:  T(C): 36.6 (09-21-17 @ 08:00), Max: 36.9 (09-20-17 @ 14:00)  HR: 92 (09-21-17 @ 08:00) (92 - 96)  RR: 16 (09-21-17 @ 08:00) (16 - 18)  SpO2: 99% (09-21-17 @ 08:00) (98% - 100%)  I&O's Summary    20 Sep 2017 07:01  -  21 Sep 2017 07:00  --------------------------------------------------------  IN: 854 mL / OUT: 700 mL / NET: 154 mL    21 Sep 2017 07:01  -  21 Sep 2017 11:08  --------------------------------------------------------  IN: 240 mL / OUT: 0 mL / NET: 240 mL    General: No distress. Comfortable.  HEENT: EOM intact.  Neck: Neck supple. JVP not elevated. No masses  Chest: Clear to auscultation bilaterally  CV: Typical LVAD sounds. No distal pulses.   Abdomen: Soft, non-distended, non-tender  Driveline exit site: Clean, dry, and intact  Skin: No rashes or skin breakdown  Neurology: Alert and oriented times three. Sensation intact  Psych: Affect normal    LVAD Interrogation: Heart Mate II  RPMs: 9000  Power: 3.5  Flow: 4.9  PI: 3.8  Event: 1 PI event  No programming changes were made      LABS:	 	    CBC Full  -  ( 21 Sep 2017 03:00 )  WBC Count : 12.8 K/uL  Hemoglobin : 9.1 g/dL  Hematocrit : 26.7 %  Platelet Count - Automated : 273 K/uL  Mean Cell Volume : 97.9 fl  Mean Cell Hemoglobin : 33.4 pg  Mean Cell Hemoglobin Concentration : 34.1 gm/dL  Auto Neutrophil # : 9.8 K/uL  Auto Lymphocyte # : 1.4 K/uL  Auto Monocyte # : 1.3 K/uL  Auto Eosinophil # : 0.3 K/uL  Auto Basophil # : 0.0 K/uL  Auto Neutrophil % : 76.2 %  Auto Lymphocyte % : 11.1 %  Auto Monocyte % : 10.0 %  Auto Eosinophil % : 2.4 %  Auto Basophil % : 0.3 %    09-21    128<L>  |  89<L>  |  18  ----------------------------<  208<H>  4.3   |  24  |  0.50  09-20    126<L>  |  90<L>  |  19  ----------------------------<  94  4.8   |  23  |  0.53    Ca    9.0      21 Sep 2017 03:00  Ca    9.0      20 Sep 2017 04:11

## 2017-09-21 NOTE — PROGRESS NOTE ADULT - ATTENDING COMMENTS
Mr. Joint is progressing well, albeit with ongoing chest pressure. He has no evidence of RV failure and his MAPs are goal. Ongoing PT and awaiting therapeutic INR.

## 2017-09-21 NOTE — PROGRESS NOTE ADULT - SUBJECTIVE AND OBJECTIVE BOX
S:  tired.  offers no complaints  Telemetry:  SR     Vital Signs Last 24 Hrs  T(C): 36.8 (17 @ 12:00), Max: 36.9 (17 @ 05:00)  T(F): 98.2 (17 @ 12:00), Max: 98.4 (17 @ 05:00)  HR: 92 (17 @ 12:00) (92 - 96)  BP: --  RR: 16 (17 @ 12:00) (16 - 18)  SpO2: 98% (17 @ 12:00) (98% - 100%)                    @ 07:01  -   @ 07:00  --------------------------------------------------------  IN: 854 mL / OUT: 700 mL / NET: 154 mL     @ 07:01  -   @ 14:20  --------------------------------------------------------  IN: 453 mL / OUT: 300 mL / NET: 153 mL              Daily Weight in k.4 (21 Sep 2017 06:00)                Coumadin    [x ] YES          [  ]      NO         Reason:     LVAD                            9.1    12.8  )-----------( 273      ( 21 Sep 2017 03:00 )             26.7           128<L>  |  89<L>  |  18  ----------------------------<  208<H>  4.3   |  24  |  0.50    Ca    9.0      21 Sep 2017 03:00        PT/INR - ( 21 Sep 2017 03:00 )   PT: 18.9 sec;   INR: 1.71 ratio         PTT - ( 21 Sep 2017 10:25 )  PTT:52.9 sec    PHYSICAL EXAM:    Neurology: alert and oriented x 3, nonfocal, no gross deficits    CV :  humm of LVAD heard    Sternal Wound :  CDI , Stable    Lungs:  decreased bs b/l bases    Abdomen: soft, nontender, nondistended, positive bowel sounds    Extremities:     no edema          aspirin enteric coated 81 milliGRAM(s) Oral daily  docusate sodium 100 milliGRAM(s) Oral three times a day  polyethylene glycol 3350 17 Gram(s) Oral daily  pantoprazole    Tablet 40 milliGRAM(s) Oral before breakfast  HYDROmorphone   Tablet 2 milliGRAM(s) Oral every 3 hours PRN  HYDROmorphone   Tablet 4 milliGRAM(s) Oral every 3 hours PRN  bisacodyl Suppository 10 milliGRAM(s) Rectal daily PRN  sorbitol 70% Solution 30 milliLiter(s) Oral once  lisinopril 7.5 milliGRAM(s) Oral daily  heparin  Infusion 900 Unit(s)/Hr IV Continuous <Continuous>                              Physical Therapy Rec:   Home  [  ]   Home w/ PT  [ x ]  Rehab  [  ]    Discussed with Cardiothoracic Team at AM rounds.

## 2017-09-22 DIAGNOSIS — G89.18 OTHER ACUTE POSTPROCEDURAL PAIN: ICD-10-CM

## 2017-09-22 DIAGNOSIS — E87.1 HYPO-OSMOLALITY AND HYPONATREMIA: ICD-10-CM

## 2017-09-22 LAB
ANION GAP SERPL CALC-SCNC: 10 MMOL/L — SIGNIFICANT CHANGE UP (ref 5–17)
APTT BLD: 61.2 SEC — HIGH (ref 27.5–37.4)
BUN SERPL-MCNC: 21 MG/DL — SIGNIFICANT CHANGE UP (ref 7–23)
CALCIUM SERPL-MCNC: 9.1 MG/DL — SIGNIFICANT CHANGE UP (ref 8.4–10.5)
CHLORIDE SERPL-SCNC: 89 MMOL/L — LOW (ref 96–108)
CO2 SERPL-SCNC: 28 MMOL/L — SIGNIFICANT CHANGE UP (ref 22–31)
CREAT SERPL-MCNC: 0.51 MG/DL — SIGNIFICANT CHANGE UP (ref 0.5–1.3)
GLUCOSE SERPL-MCNC: 105 MG/DL — HIGH (ref 70–99)
HCT VFR BLD CALC: 27.6 % — LOW (ref 39–50)
HGB BLD-MCNC: 9.6 G/DL — LOW (ref 13–17)
INR BLD: 1.66 RATIO — HIGH (ref 0.88–1.16)
LDH SERPL L TO P-CCNC: 410 U/L — HIGH (ref 50–242)
MCHC RBC-ENTMCNC: 34 PG — SIGNIFICANT CHANGE UP (ref 27–34)
MCHC RBC-ENTMCNC: 34.8 GM/DL — SIGNIFICANT CHANGE UP (ref 32–36)
MCV RBC AUTO: 97.8 FL — SIGNIFICANT CHANGE UP (ref 80–100)
PLATELET # BLD AUTO: 345 K/UL — SIGNIFICANT CHANGE UP (ref 150–400)
POTASSIUM SERPL-MCNC: 4.7 MMOL/L — SIGNIFICANT CHANGE UP (ref 3.5–5.3)
POTASSIUM SERPL-SCNC: 4.7 MMOL/L — SIGNIFICANT CHANGE UP (ref 3.5–5.3)
PROTHROM AB SERPL-ACNC: 18.1 SEC — HIGH (ref 9.8–12.7)
RBC # BLD: 2.82 M/UL — LOW (ref 4.2–5.8)
RBC # FLD: 13.6 % — SIGNIFICANT CHANGE UP (ref 10.3–14.5)
SODIUM SERPL-SCNC: 127 MMOL/L — LOW (ref 135–145)
WBC # BLD: 17.5 K/UL — HIGH (ref 3.8–10.5)
WBC # FLD AUTO: 17.5 K/UL — HIGH (ref 3.8–10.5)

## 2017-09-22 PROCEDURE — 99233 SBSQ HOSP IP/OBS HIGH 50: CPT | Mod: 25

## 2017-09-22 PROCEDURE — 71010: CPT | Mod: 26

## 2017-09-22 PROCEDURE — 93750 INTERROGATION VAD IN PERSON: CPT

## 2017-09-22 RX ORDER — WARFARIN SODIUM 2.5 MG/1
5 TABLET ORAL ONCE
Qty: 0 | Refills: 0 | Status: COMPLETED | OUTPATIENT
Start: 2017-09-22 | End: 2017-09-22

## 2017-09-22 RX ADMIN — WARFARIN SODIUM 5 MILLIGRAM(S): 2.5 TABLET ORAL at 22:00

## 2017-09-22 RX ADMIN — Medication 100 MILLIGRAM(S): at 16:51

## 2017-09-22 RX ADMIN — HYDROMORPHONE HYDROCHLORIDE 4 MILLIGRAM(S): 2 INJECTION INTRAMUSCULAR; INTRAVENOUS; SUBCUTANEOUS at 12:02

## 2017-09-22 RX ADMIN — Medication 100 MILLIGRAM(S): at 22:00

## 2017-09-22 RX ADMIN — HYDROMORPHONE HYDROCHLORIDE 4 MILLIGRAM(S): 2 INJECTION INTRAMUSCULAR; INTRAVENOUS; SUBCUTANEOUS at 12:30

## 2017-09-22 RX ADMIN — PANTOPRAZOLE SODIUM 40 MILLIGRAM(S): 20 TABLET, DELAYED RELEASE ORAL at 06:22

## 2017-09-22 RX ADMIN — Medication 100 MILLIGRAM(S): at 06:22

## 2017-09-22 RX ADMIN — HEPARIN SODIUM 12 UNIT(S)/HR: 5000 INJECTION INTRAVENOUS; SUBCUTANEOUS at 08:00

## 2017-09-22 RX ADMIN — LISINOPRIL 7.5 MILLIGRAM(S): 2.5 TABLET ORAL at 06:22

## 2017-09-22 RX ADMIN — HYDROMORPHONE HYDROCHLORIDE 4 MILLIGRAM(S): 2 INJECTION INTRAMUSCULAR; INTRAVENOUS; SUBCUTANEOUS at 03:35

## 2017-09-22 RX ADMIN — POLYETHYLENE GLYCOL 3350 17 GRAM(S): 17 POWDER, FOR SOLUTION ORAL at 12:03

## 2017-09-22 RX ADMIN — HYDROMORPHONE HYDROCHLORIDE 4 MILLIGRAM(S): 2 INJECTION INTRAMUSCULAR; INTRAVENOUS; SUBCUTANEOUS at 17:23

## 2017-09-22 RX ADMIN — HYDROMORPHONE HYDROCHLORIDE 4 MILLIGRAM(S): 2 INJECTION INTRAMUSCULAR; INTRAVENOUS; SUBCUTANEOUS at 03:05

## 2017-09-22 RX ADMIN — Medication 81 MILLIGRAM(S): at 12:03

## 2017-09-22 RX ADMIN — HEPARIN SODIUM 12 UNIT(S)/HR: 5000 INJECTION INTRAVENOUS; SUBCUTANEOUS at 00:28

## 2017-09-22 RX ADMIN — HYDROMORPHONE HYDROCHLORIDE 4 MILLIGRAM(S): 2 INJECTION INTRAMUSCULAR; INTRAVENOUS; SUBCUTANEOUS at 16:53

## 2017-09-22 NOTE — PROGRESS NOTE ADULT - ASSESSMENT
Mr. Quispe is a 62 year old man with a nonischemic cardiomyopathy with ACC/AHA stage D congestive heart failure who presented with abdominal pain due to cardiogenic shock, now s/p Heart Mate II LVAD with TV annuloplasty ring on 9/12. His postoperative course was complicated by bleeding requiring transfusions, which has stabilitzed. He currently is euvolemic without evidence of right heart failure. His INR is slightly subtherapeutic and his MAPs are at goal. His primary issues is sternal pain at the moment.

## 2017-09-22 NOTE — PROGRESS NOTE ADULT - PROBLEM SELECTOR PLAN 3
INR 1.7 today (Goal 2-2.5)  Coumadin with heparin bridge. Will give 5 mg of coumadin again tonight.   Continue aspirin 81 mg daily

## 2017-09-22 NOTE — PROGRESS NOTE ADULT - SUBJECTIVE AND OBJECTIVE BOX
LVAD COORDINATOR NOTE:  LVAD Interrogation: no alrams  Pump Flow: 6.6  Pump Speed: 9000  Pulse Index: 5.0  Pump Power: 6.0  VAD Events: none  Driveline evaluation:  called to see patients driveline as it was bleeding. Earlier patient was found with pocket controller dangling off the chair he was sitting in. Driveline dressing changed under sterile technique. There was clot and active bleeding from underneath the driveline. Stich was still intact. Are cleaned and a new dressing applied the bleeding looked like it had stopped.  Programming Changes: [ X] No changes made [ ] Changes made:    I did some education with the patient.  We discussed how long the batteries last, how long the back up battery in the power module lasts, how long the back up battery in the controller lasts. We reviewed battery alarms. Never to disconnect both power leads at the same time.    Mr. Quispe keeps stating he knows everything he needs to about the pump and doesn't want to rely on his family but when asking questions for teach back he gets flustered and annoyed.  As the session continued he did start to answer questions without any problems

## 2017-09-22 NOTE — PROGRESS NOTE ADULT - SUBJECTIVE AND OBJECTIVE BOX
im pain    VITAL SIGNS    Telemetry:  nsr 90    Vital Signs Last 24 Hrs  T(C): 37 (17 @ 08:00), Max: 37.3 (17 @ 00:00)  T(F): 98.6 (17 @ 08:00), Max: 99.2 (17 @ 00:00)  HR: 95 (17 @ 08:00) (92 - 100)  BP: -- 80-84  RR: 16 (17 @ 08:00) (16 - 18)  SpO2: 98% (17 @ 08:00) (98% - 100%)                    @ 07:01  -   @ 07:00  --------------------------------------------------------  IN: 855 mL / OUT: 950 mL / NET: -95 mL     @ 07:01  -   @ 10:21  --------------------------------------------------------  IN: 240 mL / OUT: 400 mL / NET: -160 mL          Daily     Daily Weight in k.7 (22 Sep 2017 07:11)        CAPILLARY BLOOD GLUCOSE                      Coumadin    [x ] YES          [  ]      NO         Reason:                               PHYSICAL EXAM        Neurology: alert and oriented x 3, nonfocal, no gross deficits  CV : + hum   Sternal Wound :  CDI , Stable  Lungs: crackles bibasilar  Abdomen: soft, nontender, nondistended, positive bowel sounds, last bowel movement  2 days ago, driveline dressing cdi  :           voiding  Extremities:        - edema, negative calve tenderness,            aspirin enteric coated 81 milliGRAM(s) Oral daily  docusate sodium 100 milliGRAM(s) Oral three times a day  polyethylene glycol 3350 17 Gram(s) Oral daily  pantoprazole    Tablet 40 milliGRAM(s) Oral before breakfast  HYDROmorphone   Tablet 2 milliGRAM(s) Oral every 3 hours PRN  HYDROmorphone   Tablet 4 milliGRAM(s) Oral every 3 hours PRN  bisacodyl Suppository 10 milliGRAM(s) Rectal daily PRN  lisinopril 7.5 milliGRAM(s) Oral daily  heparin  Infusion 900 Unit(s)/Hr IV Continuous <Continuous>                    Physical Therapy Rec:   Home  [  ]   Home w/ PT  [  ]  Rehab  [  ]  Discussed with Cardiothoracic Team at AM rounds.

## 2017-09-22 NOTE — PROGRESS NOTE ADULT - SUBJECTIVE AND OBJECTIVE BOX
Subjective:    Medications:  aspirin enteric coated 81 milliGRAM(s) Oral daily  docusate sodium 100 milliGRAM(s) Oral three times a day  polyethylene glycol 3350 17 Gram(s) Oral daily  pantoprazole    Tablet 40 milliGRAM(s) Oral before breakfast  HYDROmorphone   Tablet 2 milliGRAM(s) Oral every 3 hours PRN  HYDROmorphone   Tablet 4 milliGRAM(s) Oral every 3 hours PRN  bisacodyl Suppository 10 milliGRAM(s) Rectal daily PRN  lisinopril 7.5 milliGRAM(s) Oral daily  heparin  Infusion 900 Unit(s)/Hr IV Continuous <Continuous>      Physical Exam:    Vital Signs Last 24 Hrs  T(C): 37 (22 Sep 2017 08:00), Max: 37.3 (22 Sep 2017 00:00)  T(F): 98.6 (22 Sep 2017 08:00), Max: 99.2 (22 Sep 2017 00:00)  HR: 95 (22 Sep 2017 08:00) (92 - 100)  BP(mean): 84 (22 Sep 2017 08:00) (68 - 84)  RR: 16 (22 Sep 2017 08:00) (16 - 18)  SpO2: 98% (22 Sep 2017 08:00) (98% - 100%)  Daily Weight in k.7 (22 Sep 2017 07:11)    I&O's Summary    21 Sep 2017 07:01  -  22 Sep 2017 07:00  --------------------------------------------------------  IN: 855 mL / OUT: 950 mL / NET: -95 mL    General: No distress. Comfortable.  HEENT: EOM intact.  Neck: Neck supple. JVP not elevated. No masses  Chest: Clear to auscultation bilaterally  CV: Typical LVAD sounds. No distal pulses  Abdomen: Soft, non-distended, non-tender  Driveline exit site: Clean, dry, and intact  Skin: No rashes or skin breakdown  Neurology: Alert and oriented. Sensation intact  Psych: Affect normal    LVAD Interrogation: Heart Mate II  RPMs: 9000  Power: 5.9  Flow: 6.3  PI: 5.6  Event: Power fluctuations yesterday, less overnight  No programming changes were made    Labs:                        9.6    17.5  )-----------( 345      ( 22 Sep 2017 05:19 )             27.6         127<L>  |  89<L>  |  21  ----------------------------<  105<H>  4.7   |  28  |  0.51    Ca    9.1      22 Sep 2017 05:19    PT/INR - ( 22 Sep 2017 05:19 )   PT: 18.1 sec;   INR: 1.66 ratio      PTT - ( 22 Sep 2017 05:19 )  PTT:61.2 sec    Lactate Dehydrogenase, Serum: 410 U/L ( @ 05:19)  Lactate Dehydrogenase, Serum: 373 U/L ( @ 03:00)  Lactate Dehydrogenase, Serum: 385 U/L ( @ 04:11)

## 2017-09-22 NOTE — PROGRESS NOTE ADULT - SUBJECTIVE AND OBJECTIVE BOX
Patient is a 62y old  Male who presents with a chief complaint of SOOB (25 Aug 2017 22:27)      SUBJECTIVE / OVERNIGHT EVENTS:  Shortness of breath is stable, still complains of pain in his precordium at the site of the surgery.  Review of Systems:   CONSTITUTIONAL: No fever, weight loss, or fatigue  EYES: No eye pain, visual disturbances, or discharge  ENMT:  No difficulty hearing, tinnitus, vertigo; No sinus or throat pain  NECK: No pain or stiffness  BREASTS: No pain, masses, or nipple discharge  RESPIRATORY: No cough, wheezing, chills or hemoptysis; No shortness of breath  CARDIOVASCULAR: No chest pain, palpitations, dizziness, or leg swelling  GASTROINTESTINAL: No abdominal or epigastric pain. No nausea, vomiting, or hematemesis; No diarrhea or constipation. No melena or hematochezia.  GENITOURINARY: No dysuria, frequency, hematuria, or incontinence  NEUROLOGICAL: No headaches, memory loss, loss of strength, numbness, or tremors  SKIN: No itching, burning, rashes, or lesions   LYMPH NODES: No enlarged glands  ENDOCRINE: No heat or cold intolerance; No hair loss  MUSCULOSKELETAL: No joint pain or swelling; No muscle, back, or extremity pain  PSYCHIATRIC: No depression, anxiety, mood swings, or difficulty sleeping  HEME/LYMPH: No easy bruising, or bleeding gums  ALLERY AND IMMUNOLOGIC: No hives or eczema    MEDICATIONS  (STANDING):  aspirin enteric coated 81 milliGRAM(s) Oral daily  docusate sodium 100 milliGRAM(s) Oral three times a day  polyethylene glycol 3350 17 Gram(s) Oral daily  pantoprazole    Tablet 40 milliGRAM(s) Oral before breakfast  lisinopril 7.5 milliGRAM(s) Oral daily  heparin  Infusion 900 Unit(s)/Hr (12 mL/Hr) IV Continuous <Continuous>  warfarin 5 milliGRAM(s) Oral once    MEDICATIONS  (PRN):  HYDROmorphone   Tablet 2 milliGRAM(s) Oral every 3 hours PRN Moderate Pain (4 - 6)  HYDROmorphone   Tablet 4 milliGRAM(s) Oral every 3 hours PRN Severe Pain (7 - 10)  bisacodyl Suppository 10 milliGRAM(s) Rectal daily PRN Constipation      PHYSICAL EXAM:  Vital Signs Last 24 Hrs  T(C): 36.8 (22 Sep 2017 11:00), Max: 37.3 (22 Sep 2017 00:00)  T(F): 98.2 (22 Sep 2017 11:00), Max: 99.2 (22 Sep 2017 00:00)  HR: 96 (22 Sep 2017 11:00) (92 - 100)  BP: --  BP(mean): 82 (22 Sep 2017 11:00) (72 - 84)  RR: 18 (22 Sep 2017 11:00) (16 - 18)  SpO2: 98% (22 Sep 2017 11:00) (98% - 100%)  I&O's Summary    21 Sep 2017 07:01  -  22 Sep 2017 07:00  --------------------------------------------------------  IN: 855 mL / OUT: 950 mL / NET: -95 mL    22 Sep 2017 07:01  -  22 Sep 2017 14:06  --------------------------------------------------------  IN: 240 mL / OUT: 400 mL / NET: -160 mL      GENERAL: NAD, well-developed  HEAD:  Atraumatic, Normocephalic  EYES: EOMI, PERRLA, conjunctiva and sclera clear  NECK: Supple, No JVD  CHEST/LUNG: Clear to auscultation bilaterally; No wheeze  HEART: Regular rate and rhythm; No murmurs, rubs, or gallops  ABDOMEN: Soft, Nontender, Nondistended; Bowel sounds present  EXTREMITIES:  2+ Peripheral Pulses, No clubbing, cyanosis, or edema  PSYCH: AAOx3  NEUROLOGY: non-focal  SKIN: No rashes or lesions    LABS:  CAPILLARY BLOOD GLUCOSE                              9.6    17.5  )-----------( 345      ( 22 Sep 2017 05:19 )             27.6     09-22    127<L>  |  89<L>  |  21  ----------------------------<  105<H>  4.7   |  28  |  0.51    Ca    9.1      22 Sep 2017 05:19      PT/INR - ( 22 Sep 2017 05:19 )   PT: 18.1 sec;   INR: 1.66 ratio         PTT - ( 22 Sep 2017 05:19 )  PTT:61.2 sec          RADIOLOGY & ADDITIONAL TESTS:    Imaging Personally Reviewed:    Consultant(s) Notes Reviewed:      Care Discussed with Consultants/Other Providers:

## 2017-09-22 NOTE — PROGRESS NOTE ADULT - ASSESSMENT
63 yo male with no PMHx presented with sob and abdominal pain  on 8/25/17.  Cta - for PE,  cardiac mRI and cath revealed   lv dysfunction and MR.  Cardiology was consulted, HF service consulted and the pt was placed in ccu with pulmonary edema. He had  low cardiac output and cardiogenic shock, and was  referred  for LVAD evaluation. ID was consulted to clear the patient for surgery.. GI was following for abd pain, no significant findings on virtual colonoscopy. He was treated for chf  with  afterload reduction  and milrinone and stabilized, euvolemic .  9/12/17 S/P Implantation of HeartMate II left ventricular assist device through median sternotomy, and repair of the tricuspid valve with a number 28 classic Christianson ring.  Post op he required inotropic support and PRBc's for acute blood loss anemia  He was transferred to sdu  9/18  He has c/o pain at the incision site and chest tube site  Chest tube to be d/c  9/20  Pain improved .  9/21 INR drifted to 1.8, Heparin started.  Lisinopril increased for BP and afterload  Leukocytosis, afebrile 61 yo male with no PMHx presented with sob and abdominal pain  on 8/25/17.  Cta - for PE,  cardiac mRI and cath revealed   lv dysfunction and MR.  Cardiology was consulted, HF service consulted and the pt was placed in ccu with pulmonary edema. He had  low cardiac output and cardiogenic shock, and was  referred  for LVAD evaluation. ID was consulted to clear the patient for surgery.. GI was following for abd pain, no significant findings on virtual colonoscopy. He was treated for chf  with  afterload reduction  and milrinone and stabilized, euvolemic .  9/12/17 S/P Implantation of HeartMate II left ventricular assist device through median sternotomy, and repair of the tricuspid valve with a number 28 classic Christianson ring.  Post op he required inotropic support and PRBc's for acute blood loss anemia  He was transferred to sdu  9/18  He has c/o pain at the incision site and chest tube site  Chest tube to be d/c  9/20  Pain improved .  9/21 INR drifted to 1.8, Heparin started.  Lisinopril increased for BP and afterload  Leukocytosis, afebrile  Hyponatremia, off lasix

## 2017-09-23 DIAGNOSIS — D72.828 OTHER ELEVATED WHITE BLOOD CELL COUNT: ICD-10-CM

## 2017-09-23 LAB
AMYLASE P1 CFR SERPL: 77 U/L — SIGNIFICANT CHANGE UP (ref 25–125)
ANION GAP SERPL CALC-SCNC: 10 MMOL/L — SIGNIFICANT CHANGE UP (ref 5–17)
APTT BLD: 73.1 SEC — HIGH (ref 27.5–37.4)
BASOPHILS # BLD AUTO: 0.1 K/UL — SIGNIFICANT CHANGE UP (ref 0–0.2)
BASOPHILS NFR BLD AUTO: 0.3 % — SIGNIFICANT CHANGE UP (ref 0–2)
BUN SERPL-MCNC: 16 MG/DL — SIGNIFICANT CHANGE UP (ref 7–23)
CALCIUM SERPL-MCNC: 9 MG/DL — SIGNIFICANT CHANGE UP (ref 8.4–10.5)
CHLORIDE SERPL-SCNC: 88 MMOL/L — LOW (ref 96–108)
CO2 SERPL-SCNC: 26 MMOL/L — SIGNIFICANT CHANGE UP (ref 22–31)
CREAT SERPL-MCNC: 0.52 MG/DL — SIGNIFICANT CHANGE UP (ref 0.5–1.3)
EOSINOPHIL # BLD AUTO: 0.4 K/UL — SIGNIFICANT CHANGE UP (ref 0–0.5)
EOSINOPHIL NFR BLD AUTO: 2.1 % — SIGNIFICANT CHANGE UP (ref 0–6)
GLUCOSE SERPL-MCNC: 100 MG/DL — HIGH (ref 70–99)
HCT VFR BLD CALC: 27.5 % — LOW (ref 39–50)
HGB BLD-MCNC: 9.3 G/DL — LOW (ref 13–17)
INR BLD: 2.08 RATIO — HIGH (ref 0.88–1.16)
LACTATE SERPL-SCNC: 1.2 MMOL/L — SIGNIFICANT CHANGE UP (ref 0.7–2)
LDH SERPL L TO P-CCNC: 432 U/L — HIGH (ref 50–242)
LIDOCAIN IGE QN: 25 U/L — SIGNIFICANT CHANGE UP (ref 7–60)
LYMPHOCYTES # BLD AUTO: 1.6 K/UL — SIGNIFICANT CHANGE UP (ref 1–3.3)
LYMPHOCYTES # BLD AUTO: 8.5 % — LOW (ref 13–44)
MCHC RBC-ENTMCNC: 33.1 PG — SIGNIFICANT CHANGE UP (ref 27–34)
MCHC RBC-ENTMCNC: 34 GM/DL — SIGNIFICANT CHANGE UP (ref 32–36)
MCV RBC AUTO: 97.4 FL — SIGNIFICANT CHANGE UP (ref 80–100)
MONOCYTES # BLD AUTO: 1.5 K/UL — HIGH (ref 0–0.9)
MONOCYTES NFR BLD AUTO: 7.9 % — SIGNIFICANT CHANGE UP (ref 2–14)
NEUTROPHILS # BLD AUTO: 15.6 K/UL — HIGH (ref 1.8–7.4)
NEUTROPHILS NFR BLD AUTO: 81.2 % — HIGH (ref 43–77)
PLATELET # BLD AUTO: 382 K/UL — SIGNIFICANT CHANGE UP (ref 150–400)
POTASSIUM SERPL-MCNC: 5.2 MMOL/L — SIGNIFICANT CHANGE UP (ref 3.5–5.3)
POTASSIUM SERPL-SCNC: 5.2 MMOL/L — SIGNIFICANT CHANGE UP (ref 3.5–5.3)
PROCALCITONIN SERPL-MCNC: 0.14 NG/ML — HIGH (ref 0–0.04)
PROTHROM AB SERPL-ACNC: 23 SEC — HIGH (ref 9.8–12.7)
RBC # BLD: 2.82 M/UL — LOW (ref 4.2–5.8)
RBC # FLD: 13.6 % — SIGNIFICANT CHANGE UP (ref 10.3–14.5)
SODIUM SERPL-SCNC: 124 MMOL/L — LOW (ref 135–145)
WBC # BLD: 19.2 K/UL — HIGH (ref 3.8–10.5)
WBC # FLD AUTO: 19.2 K/UL — HIGH (ref 3.8–10.5)

## 2017-09-23 PROCEDURE — 71250 CT THORAX DX C-: CPT | Mod: 26

## 2017-09-23 PROCEDURE — 93750 INTERROGATION VAD IN PERSON: CPT

## 2017-09-23 PROCEDURE — 99233 SBSQ HOSP IP/OBS HIGH 50: CPT | Mod: GC

## 2017-09-23 PROCEDURE — 99232 SBSQ HOSP IP/OBS MODERATE 35: CPT

## 2017-09-23 PROCEDURE — 74176 CT ABD & PELVIS W/O CONTRAST: CPT | Mod: 26

## 2017-09-23 RX ORDER — VANCOMYCIN HCL 1 G
1250 VIAL (EA) INTRAVENOUS ONCE
Qty: 0 | Refills: 0 | Status: COMPLETED | OUTPATIENT
Start: 2017-09-23 | End: 2017-09-23

## 2017-09-23 RX ORDER — VANCOMYCIN HCL 1 G
1250 VIAL (EA) INTRAVENOUS EVERY 12 HOURS
Qty: 0 | Refills: 0 | Status: DISCONTINUED | OUTPATIENT
Start: 2017-09-24 | End: 2017-09-29

## 2017-09-23 RX ORDER — LACTOBACILLUS ACIDOPH-L.BULGARICUS 1 MILLION CELL CHEWABLE TABLET 1MM CELL
1 TABLET,CHEWABLE ORAL THREE TIMES A DAY
Qty: 0 | Refills: 0 | Status: DISCONTINUED | OUTPATIENT
Start: 2017-09-23 | End: 2017-10-01

## 2017-09-23 RX ORDER — SORBITOL SOLUTION 70 %
20 SOLUTION, ORAL MISCELLANEOUS ONCE
Qty: 0 | Refills: 0 | Status: COMPLETED | OUTPATIENT
Start: 2017-09-23 | End: 2017-09-23

## 2017-09-23 RX ORDER — SODIUM CHLORIDE 9 MG/ML
250 INJECTION INTRAMUSCULAR; INTRAVENOUS; SUBCUTANEOUS ONCE
Qty: 0 | Refills: 0 | Status: COMPLETED | OUTPATIENT
Start: 2017-09-23 | End: 2017-09-23

## 2017-09-23 RX ORDER — WARFARIN SODIUM 2.5 MG/1
5 TABLET ORAL ONCE
Qty: 0 | Refills: 0 | Status: COMPLETED | OUTPATIENT
Start: 2017-09-23 | End: 2017-09-23

## 2017-09-23 RX ORDER — PIPERACILLIN AND TAZOBACTAM 4; .5 G/20ML; G/20ML
3.38 INJECTION, POWDER, LYOPHILIZED, FOR SOLUTION INTRAVENOUS EVERY 8 HOURS
Qty: 0 | Refills: 0 | Status: DISCONTINUED | OUTPATIENT
Start: 2017-09-23 | End: 2017-09-29

## 2017-09-23 RX ORDER — VANCOMYCIN HCL 1 G
VIAL (EA) INTRAVENOUS
Qty: 0 | Refills: 0 | Status: DISCONTINUED | OUTPATIENT
Start: 2017-09-23 | End: 2017-09-29

## 2017-09-23 RX ADMIN — Medication 20 MILLILITER(S): at 12:52

## 2017-09-23 RX ADMIN — HYDROMORPHONE HYDROCHLORIDE 4 MILLIGRAM(S): 2 INJECTION INTRAMUSCULAR; INTRAVENOUS; SUBCUTANEOUS at 02:50

## 2017-09-23 RX ADMIN — LISINOPRIL 7.5 MILLIGRAM(S): 2.5 TABLET ORAL at 06:14

## 2017-09-23 RX ADMIN — HYDROMORPHONE HYDROCHLORIDE 4 MILLIGRAM(S): 2 INJECTION INTRAMUSCULAR; INTRAVENOUS; SUBCUTANEOUS at 10:48

## 2017-09-23 RX ADMIN — SODIUM CHLORIDE 500 MILLILITER(S): 9 INJECTION INTRAMUSCULAR; INTRAVENOUS; SUBCUTANEOUS at 11:44

## 2017-09-23 RX ADMIN — HYDROMORPHONE HYDROCHLORIDE 4 MILLIGRAM(S): 2 INJECTION INTRAMUSCULAR; INTRAVENOUS; SUBCUTANEOUS at 06:14

## 2017-09-23 RX ADMIN — Medication 100 MILLIGRAM(S): at 21:47

## 2017-09-23 RX ADMIN — Medication 166.67 MILLIGRAM(S): at 14:58

## 2017-09-23 RX ADMIN — HYDROMORPHONE HYDROCHLORIDE 4 MILLIGRAM(S): 2 INJECTION INTRAMUSCULAR; INTRAVENOUS; SUBCUTANEOUS at 23:26

## 2017-09-23 RX ADMIN — HYDROMORPHONE HYDROCHLORIDE 4 MILLIGRAM(S): 2 INJECTION INTRAMUSCULAR; INTRAVENOUS; SUBCUTANEOUS at 22:56

## 2017-09-23 RX ADMIN — PANTOPRAZOLE SODIUM 40 MILLIGRAM(S): 20 TABLET, DELAYED RELEASE ORAL at 06:14

## 2017-09-23 RX ADMIN — HYDROMORPHONE HYDROCHLORIDE 4 MILLIGRAM(S): 2 INJECTION INTRAMUSCULAR; INTRAVENOUS; SUBCUTANEOUS at 06:50

## 2017-09-23 RX ADMIN — HYDROMORPHONE HYDROCHLORIDE 4 MILLIGRAM(S): 2 INJECTION INTRAMUSCULAR; INTRAVENOUS; SUBCUTANEOUS at 10:18

## 2017-09-23 RX ADMIN — Medication 100 MILLIGRAM(S): at 13:26

## 2017-09-23 RX ADMIN — HYDROMORPHONE HYDROCHLORIDE 4 MILLIGRAM(S): 2 INJECTION INTRAMUSCULAR; INTRAVENOUS; SUBCUTANEOUS at 02:07

## 2017-09-23 RX ADMIN — LACTOBACILLUS ACIDOPH-L.BULGARICUS 1 MILLION CELL CHEWABLE TABLET 1 TABLET(S): at 22:19

## 2017-09-23 RX ADMIN — POLYETHYLENE GLYCOL 3350 17 GRAM(S): 17 POWDER, FOR SOLUTION ORAL at 12:45

## 2017-09-23 RX ADMIN — PIPERACILLIN AND TAZOBACTAM 25 GRAM(S): 4; .5 INJECTION, POWDER, LYOPHILIZED, FOR SOLUTION INTRAVENOUS at 21:47

## 2017-09-23 RX ADMIN — WARFARIN SODIUM 5 MILLIGRAM(S): 2.5 TABLET ORAL at 21:47

## 2017-09-23 RX ADMIN — HEPARIN SODIUM 12 UNIT(S)/HR: 5000 INJECTION INTRAVENOUS; SUBCUTANEOUS at 10:18

## 2017-09-23 RX ADMIN — Medication 81 MILLIGRAM(S): at 12:45

## 2017-09-23 RX ADMIN — Medication 100 MILLIGRAM(S): at 06:14

## 2017-09-23 NOTE — PROGRESS NOTE ADULT - ASSESSMENT
63 yo male with no PMHx presented with sob and abdominal pain  on 8/25/17.  Cta - for PE,  cardiac mRI and cath revealed   lv dysfunction and MR.  Cardiology was consulted, HF service consulted and the pt was placed in ccu with pulmonary edema. He had  low cardiac output and cardiogenic shock, and was  referred  for LVAD evaluation. ID was consulted to clear the patient for surgery.. GI was following for abd pain, no significant findings on virtual colonoscopy. He was treated for chf  with  afterload reduction  and milrinone and stabilized, euvolemic .  9/12/17 S/P Implantation of HeartMate II left ventricular assist device through median sternotomy, and repair of the tricuspid valve with a number 28 classic Christianson ring.  Post op he required inotropic support and PRBc's for acute blood loss anemia  He was transferred to sdu  9/18  He has c/o pain at the incision site and chest tube site  Chest tube to be d/c  9/20  Pain improved .  9/21 INR drifted to 1.8, Heparin started.  Lisinopril increased for BP and afterload  Leukocytosis, afebrile  Hyponatremia, off lasix  9/22 Leukocytosis WBC 19  CT chest/ab/plevis ordered  blood cx x2  ID consulted fluid bolus .9  per Dr Iyer low sodium, diet d/c regular diet ordered INR 2.08 heparin gtt d/c 61 yo male with no PMHx presented with sob and abdominal pain  on 8/25/17.  Cta - for PE,  cardiac mRI and cath revealed   lv dysfunction and MR.  Cardiology was consulted, HF service consulted and the pt was placed in ccu with pulmonary edema. He had  low cardiac output and cardiogenic shock, and was  referred  for LVAD evaluation. ID was consulted to clear the patient for surgery.. GI was following for abd pain, no significant findings on virtual colonoscopy. He was treated for chf  with  afterload reduction  and milrinone and stabilized, euvolemic .  9/12/17 S/P Implantation of HeartMate II left ventricular assist device through median sternotomy, and repair of the tricuspid valve with a number 28 classic Christianson ring.  Post op he required inotropic support and PRBc's for acute blood loss anemia  He was transferred to sdu  9/18  He has c/o pain at the incision site and chest tube site  Chest tube to be d/c  9/20  Pain improved .  9/21 INR drifted to 1.8, Heparin started.  Lisinopril increased for BP and afterload  Leukocytosis, afebrile  Hyponatremia, off lasix  9/22 Leukocytosis WBC 19  CT chest/ab/plevis ordered  blood cx x2  ID consulted fluid bolus .9  per Dr Iyer low sodium, diet d/c regular diet ordered INR 2.08 heparin gtt d/c coumadin 5 tonight

## 2017-09-23 NOTE — PROGRESS NOTE ADULT - ATTENDING COMMENTS
Abdominal pain and diarrhea. Pls check stool C. diff.  Noncontrast CT scan unrevealing.  Appreciate ID input; will check blood cxs. Abdominal pain and diarrhea. Pls check stool C. diff.  Noncontrast CT scan unrevealing.  Appreciate ID input; will check blood cxs.  Still wincing with abdominal pain.  Order CT C/A/P with contrast.

## 2017-09-23 NOTE — PROGRESS NOTE ADULT - PROBLEM SELECTOR PLAN 6
Now with persistent abdominal pain, would check CT A/P especially given rising WBCs Now with persistent abdominal pain, would check CT C/A/P especially given rising WBCs

## 2017-09-23 NOTE — PROGRESS NOTE ADULT - PROBLEM SELECTOR PLAN 3
INR 2.08 today (Goal 2-2.5)  Coumadin now, can stop heparin bridge. Will give 5 mg of coumadin again tonight.   Continue aspirin 81 mg daily

## 2017-09-23 NOTE — PROGRESS NOTE ADULT - SUBJECTIVE AND OBJECTIVE BOX
Interval Events:    Still with abdominal pain. Feels that the battery packs are too heavy    ROS: Denies HA/dizziness/CP/SOB/PND/orthopnea/LE edema    MEDICATIONS:  aspirin enteric coated 81 milliGRAM(s) Oral daily  lisinopril 7.5 milliGRAM(s) Oral daily  warfarin 5 milliGRAM(s) Oral once  HYDROmorphone   Tablet 2 milliGRAM(s) Oral every 3 hours PRN  HYDROmorphone   Tablet 4 milliGRAM(s) Oral every 3 hours PRN  docusate sodium 100 milliGRAM(s) Oral three times a day  polyethylene glycol 3350 17 Gram(s) Oral daily  pantoprazole    Tablet 40 milliGRAM(s) Oral before breakfast  bisacodyl Suppository 10 milliGRAM(s) Rectal daily PRN      sodium chloride 0.9% Bolus 250 milliLiter(s) IV Bolus once      PHYSICAL EXAM:  T(C): 36.6 (09-23-17 @ 10:00), Max: 36.8 (09-22-17 @ 14:00)  HR: 92 (09-23-17 @ 10:00) (92 - 99)  BP: --  RR: 18 (09-23-17 @ 10:00) (16 - 18)  SpO2: 97% (09-23-17 @ 10:00) (94% - 97%)  Wt(kg): --  I&O's Summary    22 Sep 2017 07:01  -  23 Sep 2017 07:00  --------------------------------------------------------  IN: 1428 mL / OUT: 1575 mL / NET: -147 mL        Appearance: No Acute Distress  HEENT: Mildly elevated JVP  Cardiovascular: Audible S1 S2, + LVAD hum  Respiratory: Clear to auscultation bilaterally  Gastrointestinal: Soft, Mildly tender, dried blood around driveline dressing  Skin: No cyanosis	  Neurologic: Non-focal  Extremities: No clubbing, cyanosis or edema  Psychiatry: A & O x 3, Mood & affect appropriate    LABS:	 	    CBC Full  -  ( 23 Sep 2017 06:44 )  WBC Count : 19.2 K/uL  Hemoglobin : 9.3 g/dL  Hematocrit : 27.5 %  Platelet Count - Automated : 382 K/uL  Mean Cell Volume : 97.4 fl  Mean Cell Hemoglobin : 33.1 pg  Mean Cell Hemoglobin Concentration : 34.0 gm/dL  Auto Neutrophil # : 15.6 K/uL  Auto Lymphocyte # : 1.6 K/uL  Auto Monocyte # : 1.5 K/uL  Auto Eosinophil # : 0.4 K/uL  Auto Basophil # : 0.1 K/uL  Auto Neutrophil % : 81.2 %  Auto Lymphocyte % : 8.5 %  Auto Monocyte % : 7.9 %  Auto Eosinophil % : 2.1 %  Auto Basophil % : 0.3 %    09-23    124<L>  |  88<L>  |  16  ----------------------------<  100<H>  5.2   |  26  |  0.52  09-22    127<L>  |  89<L>  |  21  ----------------------------<  105<H>  4.7   |  28  |  0.51    Ca    9.0      23 Sep 2017 06:44  Ca    9.1      22 Sep 2017 05:19        proBNP:   Lipid Profile:   HgA1c:     CARDIAC MARKERS:      TELEMETRY:   ECG:      DIAGNOSTIC TESTING:    [ ] Echocardiogram:  [ ] Catheterization:

## 2017-09-23 NOTE — PROGRESS NOTE ADULT - SUBJECTIVE AND OBJECTIVE BOX
Subjective Hello no acute distress     VITAL SIGNS    Telemetry:  NSR 90s    Vital Signs Last 24 Hrs  T(C): 36.6 (09-23-17 @ 10:00), Max: 36.8 (09-22-17 @ 11:00)  T(F): 97.8 (09-23-17 @ 10:00), Max: 98.2 (09-22-17 @ 11:00)  HR: 92 (09-23-17 @ 10:00) (92 - 99)  BP: --  RR: 18 (09-23-17 @ 10:00) (16 - 18)  SpO2: 97% (09-23-17 @ 10:00) (94% - 98%)   LVAD   speed 9000  flow 5.5  power 5.5  P I4.8         09-22 @ 07:01  -  09-23 @ 07:00  --------------------------------------------------------  IN: 1428 mL / OUT: 1575 mL / NET: -147 mL        Coumadin    [ x] YES          [  ]      NO         Reason:     lvad    PHYSICAL EXAM  Neurology: alert and oriented x 3, nonfocal, no gross deficits  CV :LVAD sounds    Sternal Wound :  MTI stable no drainage  Lungs: B/L breath sounds left diminished in base  Abdomen: soft, nontender, nondistended, positive bowel sounds,   :    voiding         Extremities:  LE B/L +DP negative edema negative calf tenderness  LVAD drive line dressing some sanguinous drainage noted improve  from yesterday        Physical Therapy Rec  Rehab  [  x]    Discussed with Cardiothoracic Team at AM rounds. Subjective Hello no acute distress     VITAL SIGNS    Telemetry:  NSR 90s    Vital Signs Last 24 Hrs  T(C): 36.6 (09-23-17 @ 10:00), Max: 36.8 (09-22-17 @ 11:00)  T(F): 97.8 (09-23-17 @ 10:00), Max: 98.2 (09-22-17 @ 11:00)  HR: 92 (09-23-17 @ 10:00) (92 - 99)  BP: --  RR: 18 (09-23-17 @ 10:00) (16 - 18)  SpO2: 97% (09-23-17 @ 10:00) (94% - 98%)   LVAD   speed 9000  flow 5.5  power 5.5  P I4.8         09-22 @ 07:01  -  09-23 @ 07:00  --------------------------------------------------------  IN: 1428 mL / OUT: 1575 mL / NET: -147 mL        Coumadin    [ x] YES          [  ]      NO         Reason:     lvad    PHYSICAL EXAM  Neurology: alert and oriented x 3, nonfocal, no gross deficits  CV :LVAD sounds    Sternal Wound :  MTI stable no drainage  Lungs: B/L breath sounds left diminished in base  Abdomen: soft, tender, nondistended, positive bowel sounds,   :    voiding         Extremities:  LE B/L +DP negative edema negative calf tenderness  LVAD drive line dressing some sanguinous drainage noted improve  from yesterday        Physical Therapy Rec  Rehab  [  x]    Discussed with Cardiothoracic Team at AM rounds.

## 2017-09-23 NOTE — PROGRESS NOTE ADULT - ASSESSMENT
61 yo man who underwent LVAD placement on 9/12/17 with course complicated to date by bleeding but not by infection post op. Patient with an increasing WBC, afebrile, VSS, but with diffuse abdominal pain and reports diarrhea x 2 episodes overnight.    Would    1. send stool for C.diff toxin with next episode    2. Agree with imaging of chest abdomen pelvis    3.   Please send blood cultures    4.   Would hold off on starting antibiotics unless he deteriorates and pending the results of the stool specimen and imaging      I can be reached at [phone] 61 yo man who underwent LVAD placement on 9/12/17 with course complicated to date by bleeding but not by infection post op. Patient with an increasing WBC, afebrile, VSS, but with diffuse abdominal pain and reports diarrhea x 2 episodes overnight.    Would    1. send stool for C.diff toxin with next episode    2. Agree with imaging of chest abdomen pelvis    3.   Please send blood cultures    4.   Will start Vancomycin and Zosyn      I can be reached at [phone]    addendum- CT reveals LLL infiltrate and effusion and FOS in abdomen without specific infection findings, but to Dr. Valenzuela's read there may be a small amount of gas around the driveline.

## 2017-09-23 NOTE — PROGRESS NOTE ADULT - SUBJECTIVE AND OBJECTIVE BOX
INFECTIOUS DISEASES FOLLOW UP-- Anitra Prater  [phone]    This is a follow up note for this  62yMale withs/p LVAD placement on 9/12/17 with complaints of abdominal pain and diarrhea that started overnight.  Patient is sitting in chair not in any distress but with abdominal discomfort      ROS:  CONSTITUTIONAL:  No fever, poor appetite  CARDIOVASCULAR:  No chest pain or palpitations other than incision site  RESPIRATORY:  No dyspnea  GASTROINTESTINAL:  No nausea, vomiting,+ diarrhea, + abdominal pain  GENITOURINARY:  No dysuria  NEUROLOGIC:  No headache,     Allergies    No Known Allergies    Intolerances        ANTIBIOTICS/RELEVANT:  antimicrobials    immunologic:    OTHER:  aspirin enteric coated 81 milliGRAM(s) Oral daily  docusate sodium 100 milliGRAM(s) Oral three times a day  polyethylene glycol 3350 17 Gram(s) Oral daily  pantoprazole    Tablet 40 milliGRAM(s) Oral before breakfast  HYDROmorphone   Tablet 2 milliGRAM(s) Oral every 3 hours PRN  HYDROmorphone   Tablet 4 milliGRAM(s) Oral every 3 hours PRN  bisacodyl Suppository 10 milliGRAM(s) Rectal daily PRN  lisinopril 7.5 milliGRAM(s) Oral daily  warfarin 5 milliGRAM(s) Oral once      Objective:  Vital Signs Last 24 Hrs  T(C): 36.6 (23 Sep 2017 10:00), Max: 36.8 (22 Sep 2017 14:00)  T(F): 97.8 (23 Sep 2017 10:00), Max: 98.2 (22 Sep 2017 14:00)  HR: 92 (23 Sep 2017 10:00) (92 - 99)  BP: --  BP(mean): 74 (23 Sep 2017 10:00) (74 - 84)  RR: 18 (23 Sep 2017 10:00) (16 - 18)  SpO2: 97% (23 Sep 2017 10:00) (94% - 97%)    PHYSICAL EXAM:  Constitutional:no acute distress, but complains of abdominal pain and diarrhea  Eyes:ALONSO, EOMI  Ear/Nose/Throat: no oral lesions, 	  Respiratory: clear BL diminished BS at bases  Cardiovascular: S1S2 LVAD sounds  median sternotomy site is healing without erythema or drainage  CT tube sites post removal are without drainage  LVAD dressing with sanguinous drainage/old  Gastrointestinal:soft, (+) BS, distended but examined in the chair tenderness mild diffuse  Extremities:no e/e/c  No Lymphadenopathy  IV sites not inflammed.    LABS:                        9.3    19.2  )-----------( 382      ( 23 Sep 2017 06:44 )             27.5     09-23    124<L>  |  88<L>  |  16  ----------------------------<  100<H>  5.2   |  26  |  0.52    Ca    9.0      23 Sep 2017 06:44      PT/INR - ( 23 Sep 2017 06:44 )   PT: 23.0 sec;   INR: 2.08 ratio         PTT - ( 23 Sep 2017 06:44 )  PTT:73.1 sec      MICROBIOLOGY:  none recently          RECENT CULTURES:      RADIOLOGY & ADDITIONAL STUDIES:    < from: Xray Chest 1 View AP -PORTABLE-Routine (09.22.17 @ 05:12) >  IMPRESSION:    1.  Left lower lung opacity, likely atelectasis, unchanged.    < end of copied text > INFECTIOUS DISEASES FOLLOW UP-- Anitra Prater  [phone]    This is a follow up note for this  62yMale withs/p LVAD placement on 9/12/17 with complaints of abdominal pain and diarrhea that started overnight.  Patient is sitting in chair not in any distress but with abdominal discomfort      ROS:  CONSTITUTIONAL:  No fever, poor appetite  CARDIOVASCULAR:  No chest pain or palpitations other than incision site  RESPIRATORY:  No dyspnea  GASTROINTESTINAL:  No nausea, vomiting,+ diarrhea, + abdominal pain  GENITOURINARY:  No dysuria  NEUROLOGIC:  No headache,     Allergies    No Known Allergies    Intolerances        ANTIBIOTICS/RELEVANT:  antimicrobials    immunologic:    OTHER:  aspirin enteric coated 81 milliGRAM(s) Oral daily  docusate sodium 100 milliGRAM(s) Oral three times a day  polyethylene glycol 3350 17 Gram(s) Oral daily  pantoprazole    Tablet 40 milliGRAM(s) Oral before breakfast  HYDROmorphone   Tablet 2 milliGRAM(s) Oral every 3 hours PRN  HYDROmorphone   Tablet 4 milliGRAM(s) Oral every 3 hours PRN  bisacodyl Suppository 10 milliGRAM(s) Rectal daily PRN  lisinopril 7.5 milliGRAM(s) Oral daily  warfarin 5 milliGRAM(s) Oral once      Objective:  Vital Signs Last 24 Hrs  T(C): 36.6 (23 Sep 2017 10:00), Max: 36.8 (22 Sep 2017 14:00)  T(F): 97.8 (23 Sep 2017 10:00), Max: 98.2 (22 Sep 2017 14:00)  HR: 92 (23 Sep 2017 10:00) (92 - 99)  BP: --  BP(mean): 74 (23 Sep 2017 10:00) (74 - 84)  RR: 18 (23 Sep 2017 10:00) (16 - 18)  SpO2: 97% (23 Sep 2017 10:00) (94% - 97%)    PHYSICAL EXAM:  Constitutional:no acute distress, but complains of abdominal pain and diarrhea  Eyes:ALONSO, EOMI  Ear/Nose/Throat: no oral lesions, 	  Respiratory: clear BL diminished BS at bases left > right  Cardiovascular: S1S2 LVAD sounds  median sternotomy site is healing without erythema or drainage  CT tube sites post removal are without drainage  LVAD dressing with sanguinous drainage/old  Gastrointestinal:soft, (+) BS, distended but examined in the chair tenderness mild diffuse  Extremities:no e/e/c  No Lymphadenopathy  IV sites not inflammed.    LABS:                        9.3    19.2  )-----------( 382      ( 23 Sep 2017 06:44 )             27.5     09-23    124<L>  |  88<L>  |  16  ----------------------------<  100<H>  5.2   |  26  |  0.52    Ca    9.0      23 Sep 2017 06:44      PT/INR - ( 23 Sep 2017 06:44 )   PT: 23.0 sec;   INR: 2.08 ratio         PTT - ( 23 Sep 2017 06:44 )  PTT:73.1 sec      MICROBIOLOGY:  none recently          RECENT CULTURES:      RADIOLOGY & ADDITIONAL STUDIES:    < from: Xray Chest 1 View AP -PORTABLE-Routine (09.22.17 @ 05:12) >  IMPRESSION:    1.  Left lower lung opacity, likely atelectasis, unchanged.    < end of copied text >

## 2017-09-24 LAB
ANION GAP SERPL CALC-SCNC: 11 MMOL/L — SIGNIFICANT CHANGE UP (ref 5–17)
APTT BLD: 31.9 SEC — SIGNIFICANT CHANGE UP (ref 27.5–37.4)
BUN SERPL-MCNC: 13 MG/DL — SIGNIFICANT CHANGE UP (ref 7–23)
CALCIUM SERPL-MCNC: 9.1 MG/DL — SIGNIFICANT CHANGE UP (ref 8.4–10.5)
CHLORIDE SERPL-SCNC: 89 MMOL/L — LOW (ref 96–108)
CO2 SERPL-SCNC: 24 MMOL/L — SIGNIFICANT CHANGE UP (ref 22–31)
CREAT SERPL-MCNC: 0.55 MG/DL — SIGNIFICANT CHANGE UP (ref 0.5–1.3)
GLUCOSE SERPL-MCNC: 110 MG/DL — HIGH (ref 70–99)
HCT VFR BLD CALC: 27.5 % — LOW (ref 39–50)
HGB BLD-MCNC: 9.4 G/DL — LOW (ref 13–17)
INR BLD: 2.08 RATIO — HIGH (ref 0.88–1.16)
LDH SERPL L TO P-CCNC: 412 U/L — HIGH (ref 50–242)
MCHC RBC-ENTMCNC: 33 PG — SIGNIFICANT CHANGE UP (ref 27–34)
MCHC RBC-ENTMCNC: 34.2 GM/DL — SIGNIFICANT CHANGE UP (ref 32–36)
MCV RBC AUTO: 96.5 FL — SIGNIFICANT CHANGE UP (ref 80–100)
PLATELET # BLD AUTO: 416 K/UL — HIGH (ref 150–400)
POTASSIUM SERPL-MCNC: 4.8 MMOL/L — SIGNIFICANT CHANGE UP (ref 3.5–5.3)
POTASSIUM SERPL-SCNC: 4.8 MMOL/L — SIGNIFICANT CHANGE UP (ref 3.5–5.3)
PROTHROM AB SERPL-ACNC: 23 SEC — HIGH (ref 9.8–12.7)
RBC # BLD: 2.85 M/UL — LOW (ref 4.2–5.8)
RBC # FLD: 13.4 % — SIGNIFICANT CHANGE UP (ref 10.3–14.5)
SODIUM SERPL-SCNC: 124 MMOL/L — LOW (ref 135–145)
WBC # BLD: 16.4 K/UL — HIGH (ref 3.8–10.5)
WBC # FLD AUTO: 16.4 K/UL — HIGH (ref 3.8–10.5)

## 2017-09-24 PROCEDURE — 99233 SBSQ HOSP IP/OBS HIGH 50: CPT | Mod: GC

## 2017-09-24 PROCEDURE — 93750 INTERROGATION VAD IN PERSON: CPT

## 2017-09-24 PROCEDURE — 99232 SBSQ HOSP IP/OBS MODERATE 35: CPT

## 2017-09-24 PROCEDURE — 74174 CTA ABD&PLVS W/CONTRAST: CPT | Mod: 26

## 2017-09-24 PROCEDURE — 71275 CT ANGIOGRAPHY CHEST: CPT | Mod: 26

## 2017-09-24 RX ORDER — SORBITOL SOLUTION 70 %
30 SOLUTION, ORAL MISCELLANEOUS ONCE
Qty: 0 | Refills: 0 | Status: DISCONTINUED | OUTPATIENT
Start: 2017-09-24 | End: 2017-10-01

## 2017-09-24 RX ORDER — WARFARIN SODIUM 2.5 MG/1
5 TABLET ORAL ONCE
Qty: 0 | Refills: 0 | Status: COMPLETED | OUTPATIENT
Start: 2017-09-24 | End: 2017-09-24

## 2017-09-24 RX ORDER — SENNA PLUS 8.6 MG/1
2 TABLET ORAL AT BEDTIME
Qty: 0 | Refills: 0 | Status: DISCONTINUED | OUTPATIENT
Start: 2017-09-24 | End: 2017-10-10

## 2017-09-24 RX ADMIN — LACTOBACILLUS ACIDOPH-L.BULGARICUS 1 MILLION CELL CHEWABLE TABLET 1 TABLET(S): at 22:28

## 2017-09-24 RX ADMIN — LACTOBACILLUS ACIDOPH-L.BULGARICUS 1 MILLION CELL CHEWABLE TABLET 1 TABLET(S): at 14:47

## 2017-09-24 RX ADMIN — SENNA PLUS 2 TABLET(S): 8.6 TABLET ORAL at 21:58

## 2017-09-24 RX ADMIN — Medication 100 MILLIGRAM(S): at 21:55

## 2017-09-24 RX ADMIN — PANTOPRAZOLE SODIUM 40 MILLIGRAM(S): 20 TABLET, DELAYED RELEASE ORAL at 05:28

## 2017-09-24 RX ADMIN — Medication 81 MILLIGRAM(S): at 12:21

## 2017-09-24 RX ADMIN — PIPERACILLIN AND TAZOBACTAM 25 GRAM(S): 4; .5 INJECTION, POWDER, LYOPHILIZED, FOR SOLUTION INTRAVENOUS at 21:58

## 2017-09-24 RX ADMIN — Medication 166.67 MILLIGRAM(S): at 05:28

## 2017-09-24 RX ADMIN — HYDROMORPHONE HYDROCHLORIDE 4 MILLIGRAM(S): 2 INJECTION INTRAMUSCULAR; INTRAVENOUS; SUBCUTANEOUS at 14:38

## 2017-09-24 RX ADMIN — WARFARIN SODIUM 5 MILLIGRAM(S): 2.5 TABLET ORAL at 21:58

## 2017-09-24 RX ADMIN — HYDROMORPHONE HYDROCHLORIDE 4 MILLIGRAM(S): 2 INJECTION INTRAMUSCULAR; INTRAVENOUS; SUBCUTANEOUS at 07:50

## 2017-09-24 RX ADMIN — Medication 100 MILLIGRAM(S): at 14:39

## 2017-09-24 RX ADMIN — LISINOPRIL 7.5 MILLIGRAM(S): 2.5 TABLET ORAL at 05:27

## 2017-09-24 RX ADMIN — LACTOBACILLUS ACIDOPH-L.BULGARICUS 1 MILLION CELL CHEWABLE TABLET 1 TABLET(S): at 05:28

## 2017-09-24 RX ADMIN — POLYETHYLENE GLYCOL 3350 17 GRAM(S): 17 POWDER, FOR SOLUTION ORAL at 12:18

## 2017-09-24 RX ADMIN — PIPERACILLIN AND TAZOBACTAM 25 GRAM(S): 4; .5 INJECTION, POWDER, LYOPHILIZED, FOR SOLUTION INTRAVENOUS at 07:00

## 2017-09-24 RX ADMIN — Medication 100 MILLIGRAM(S): at 05:27

## 2017-09-24 RX ADMIN — HYDROMORPHONE HYDROCHLORIDE 4 MILLIGRAM(S): 2 INJECTION INTRAMUSCULAR; INTRAVENOUS; SUBCUTANEOUS at 15:00

## 2017-09-24 RX ADMIN — HYDROMORPHONE HYDROCHLORIDE 4 MILLIGRAM(S): 2 INJECTION INTRAMUSCULAR; INTRAVENOUS; SUBCUTANEOUS at 08:15

## 2017-09-24 RX ADMIN — Medication 166.67 MILLIGRAM(S): at 20:14

## 2017-09-24 RX ADMIN — PIPERACILLIN AND TAZOBACTAM 25 GRAM(S): 4; .5 INJECTION, POWDER, LYOPHILIZED, FOR SOLUTION INTRAVENOUS at 14:46

## 2017-09-24 NOTE — PROGRESS NOTE ADULT - SUBJECTIVE AND OBJECTIVE BOX
INFECTIOUS DISEASES FOLLOW UP-- Anitra Prater  [phone]    This is a follow up note for this  62yMale with a history of an LVAD placement on 9/12/17 with persistent complaints of right sided abdominal pain. He is sitting up in a chair weak, but not in distress. Reports normal BM this morning.        ROS:  CONSTITUTIONAL:  No fever, good appetite  CARDIOVASCULAR:  No chest pain or palpitations  RESPIRATORY:  No dyspnea  GASTROINTESTINAL:  No nausea, vomiting, diarrhea, or +abdominal pain  GENITOURINARY:  No dysuria  NEUROLOGIC:  No headache,     Allergies    No Known Allergies    Intolerances        ANTIBIOTICS/RELEVANT:  antimicrobials  vancomycin  IVPB 1250 milliGRAM(s) IV Intermittent every 12 hours  vancomycin  IVPB      piperacillin/tazobactam IVPB. 3.375 Gram(s) IV Intermittent every 8 hours    immunologic:    OTHER:  aspirin enteric coated 81 milliGRAM(s) Oral daily  docusate sodium 100 milliGRAM(s) Oral three times a day  polyethylene glycol 3350 17 Gram(s) Oral daily  pantoprazole    Tablet 40 milliGRAM(s) Oral before breakfast  HYDROmorphone   Tablet 2 milliGRAM(s) Oral every 3 hours PRN  HYDROmorphone   Tablet 4 milliGRAM(s) Oral every 3 hours PRN  bisacodyl Suppository 10 milliGRAM(s) Rectal daily PRN  lisinopril 7.5 milliGRAM(s) Oral daily  lactobacillus acidophilus and bulgaricus Chewable 1 Tablet(s) Chew three times a day  warfarin 5 milliGRAM(s) Oral once  sorbitol 70% Solution 30 milliLiter(s) Oral once  senna 2 Tablet(s) Oral at bedtime      Objective:  Vital Signs Last 24 Hrs  T(C): 36.7 (24 Sep 2017 08:00), Max: 36.8 (23 Sep 2017 14:00)  T(F): 98 (24 Sep 2017 08:00), Max: 98.3 (24 Sep 2017 02:00)  HR: 89 (24 Sep 2017 08:00) (89 - 100)  BP: --  BP(mean): 68 (24 Sep 2017 08:00) (68 - 84)  RR: 18 (24 Sep 2017 08:00) (17 - 18)  SpO2: 100% (24 Sep 2017 08:00) (98% - 100%)    PHYSICAL EXAM:  Constitutional:no acute distress  Eyes:ALONSO, EOMI  Ear/Nose/Throat: no oral lesions, 	  Respiratory: clear BL diminished sounds at base left> right  Cardiovascular: S1S2 LVAd sounds  dried sanguinous material on dirveline dressing  Gastrointestinal: slightly distended with guarding and pain on right mid abdomen  Extremities:no e/e/c  No Lymphadenopathy  IV sites not inflammed.    LABS:                        9.4    16.4  )-----------( 416      ( 24 Sep 2017 02:26 )             27.5     09-24    124<L>  |  89<L>  |  13  ----------------------------<  110<H>  4.8   |  24  |  0.55    Ca    9.1      24 Sep 2017 02:26      PT/INR - ( 24 Sep 2017 02:26 )   PT: 23.0 sec;   INR: 2.08 ratio         PTT - ( 24 Sep 2017 02:26 )  PTT:31.9 sec      MICROBIOLOGY:    Culture - Blood (09.23.17 @ 09:54)    Specimen Source: .Blood Blood-Peripheral    Culture Results:   No growth to date.    Culture - Blood (09.23.17 @ 09:54)    Specimen Source: .Blood Blood-Peripheral    Culture Results:   No growth to date.            RECENT CULTURES:      RADIOLOGY & ADDITIONAL STUDIES:    < from: CT Abdomen and Pelvis No Cont (09.23.17 @ 12:44) >  PRESSION:     Evaluation for a source of infection is limited secondary to lack of   intravenous contrast.     Status post LVAD placement.    Paraseptal emphysema.    Left lower lobe rounded atelectasis.    < end of copied text >

## 2017-09-24 NOTE — PROGRESS NOTE ADULT - PROBLEM SELECTOR PLAN 4
INR 2.08 today (Goal 2-2.5)   Will give 5 mg of coumadin again tonight.   Continue aspirin 81 mg daily

## 2017-09-24 NOTE — PROGRESS NOTE ADULT - PROBLEM SELECTOR PLAN 1
Now with persistent abdominal pain, would check CT C/A/P WITH contrast given previous limited study, CTA ordered and protocoled  As per ID will cont with antimicrobials WBC trending down  SEd rate in am

## 2017-09-24 NOTE — PROGRESS NOTE ADULT - ASSESSMENT
63 yo man s/p LVAD placement on 9/12/17 with complaints of abdominal pain and some guarding noted on exam.  Blood cultures sent yesterday are NGTD.  Driveline dressing with old dried blood on it.    Would :  Continue Zosyn and Vanco  2. check Vanco torugh prior to the fourth dose  3. He needs a repeat imaging study of the abdomen with contrast to look for intra-abdominal pathology/abscess given his persistent abdominal pain with guarding on exam    Discussed with CTS  I can be reached at [phone]

## 2017-09-24 NOTE — PROGRESS NOTE ADULT - SUBJECTIVE AND OBJECTIVE BOX
VITAL SIGNS    Telemetry:  SR 80-*90  Vital Signs Last 24 Hrs  T(C): 36.7 (09-24-17 @ 08:00), Max: 36.8 (09-23-17 @ 14:00)  T(F): 98 (09-24-17 @ 08:00), Max: 98.3 (09-24-17 @ 02:00)  HR: 89 (09-24-17 @ 08:00) (89 - 100)  BP: --  RR: 18 (09-24-17 @ 08:00) (17 - 18)  SpO2: 100% (09-24-17 @ 08:00) (98% - 100%)            09-23 @ 07:01  -  09-24 @ 07:00  --------------------------------------------------------  IN: 1466 mL / OUT: 1400 mL / NET: 66 mL    09-24 @ 07:01  -  09-24 @ 11:53  --------------------------------------------------------  IN: 100 mL / OUT: 0 mL / NET: 100 mL      Daily   Admit Wt: Drug Dosing Weight  Height (cm): 177.8 (31 Aug 2017 18:00)  Weight (kg): 60 (31 Aug 2017 18:00)  BMI (kg/m2): 19 (31 Aug 2017 18:00)  BSA (m2): 1.75 (31 Aug 2017 18:00)          MEDICATIONS  aspirin enteric coated 81 milliGRAM(s) Oral daily  docusate sodium 100 milliGRAM(s) Oral three times a day  polyethylene glycol 3350 17 Gram(s) Oral daily  pantoprazole    Tablet 40 milliGRAM(s) Oral before breakfast  HYDROmorphone   Tablet 2 milliGRAM(s) Oral every 3 hours PRN  HYDROmorphone   Tablet 4 milliGRAM(s) Oral every 3 hours PRN  bisacodyl Suppository 10 milliGRAM(s) Rectal daily PRN  lisinopril 7.5 milliGRAM(s) Oral daily  lactobacillus acidophilus and bulgaricus Chewable 1 Tablet(s) Chew three times a day  vancomycin  IVPB 1250 milliGRAM(s) IV Intermittent every 12 hours  vancomycin  IVPB      piperacillin/tazobactam IVPB. 3.375 Gram(s) IV Intermittent every 8 hours  warfarin 5 milliGRAM(s) Oral once  sorbitol 70% Solution 30 milliLiter(s) Oral once  senna 2 Tablet(s) Oral at bedtime      PHYSICAL EXAM    Subjective: c/p abd pain   Neurology: alert and oriented x 3, nonfocal, no gross deficits  CV : lvad + humm  Sternal Wound :  CDI , Stable, drive line site old draiange  Lungs: CTA, equal chest expansion  Abdomen: tender to touch,  nondistended, positive bowel sounds, last bowel movement 9/23/17   :    voids  Extremities:    no edema,  +dp b/l , no calt tenderness b/l , warm and perfused b/l    LABS  09-24    124<L>  |  89<L>  |  13  ----------------------------<  110<H>  4.8   |  24  |  0.55    Ca    9.1      24 Sep 2017 02:26                                   9.4    16.4  )-----------( 416      ( 24 Sep 2017 02:26 )             27.5          PT/INR - ( 24 Sep 2017 02:26 )   PT: 23.0 sec;   INR: 2.08 ratio         PTT - ( 24 Sep 2017 02:26 )  PTT:31.9 sec         PAST MEDICAL & SURGICAL HISTORY:  No pertinent past medical history  No significant past surgical history

## 2017-09-24 NOTE — PROGRESS NOTE ADULT - PROBLEM SELECTOR PLAN 6
Now with persistent abdominal pain, would check CT C/A/P WITH contrast given previous limited study, especially given rising WBCs and continued abdominal pain.

## 2017-09-24 NOTE — PROGRESS NOTE ADULT - SUBJECTIVE AND OBJECTIVE BOX
Interval History: No acute events overnight. Complains of left sided abdominal pain slightly.     Medications:  aspirin enteric coated 81 milliGRAM(s) Oral daily  docusate sodium 100 milliGRAM(s) Oral three times a day  polyethylene glycol 3350 17 Gram(s) Oral daily  pantoprazole    Tablet 40 milliGRAM(s) Oral before breakfast  HYDROmorphone   Tablet 2 milliGRAM(s) Oral every 3 hours PRN  HYDROmorphone   Tablet 4 milliGRAM(s) Oral every 3 hours PRN  bisacodyl Suppository 10 milliGRAM(s) Rectal daily PRN  lisinopril 7.5 milliGRAM(s) Oral daily  lactobacillus acidophilus and bulgaricus Chewable 1 Tablet(s) Chew three times a day  vancomycin  IVPB 1250 milliGRAM(s) IV Intermittent every 12 hours  vancomycin  IVPB      piperacillin/tazobactam IVPB. 3.375 Gram(s) IV Intermittent every 8 hours    Vitals:  T(C): 36.7 (09-24-17 @ 08:00), Max: 36.8 (09-23-17 @ 14:00)  HR: 89 (09-24-17 @ 08:00) (89 - 100)  BP: --  BP(mean): 68 (09-24-17 @ 08:00) (68 - 84)  ABP: --  ABP(mean): --  RR: 18 (09-24-17 @ 08:00) (17 - 18)  SpO2: 100% (09-24-17 @ 08:00) (98% - 100%)  Wt(kg): --  CVP(cm H2O): --    Daily     Daily     I&O's Summary    23 Sep 2017 07:01  -  24 Sep 2017 07:00  --------------------------------------------------------  IN: 1466 mL / OUT: 1400 mL / NET: 66 mL    24 Sep 2017 07:01  -  24 Sep 2017 10:15  --------------------------------------------------------  IN: 100 mL / OUT: 0 mL / NET: 100 mL    Physical Exam:  Appearance: No Acute Distress, cachectic   HEENT: No JVD  Cardiovascular: Normal S1 S2, No murmurs/rubs/gallops  Respiratory: Clear to auscultation bilaterally  Gastrointestinal: Soft, Non-tender	  Skin: No cyanosis	  Neurologic: Non-focal  Extremities: No LE edema  Psychiatry: A & O x 3, Mood & affect appropriate    LVAD Interrogation:  Pump Flow: 5.9  Pump Speed: 9000  Pulse Index: 3.8  Pump Power: 5.8  VAD Events: Unable to pull up events on machine  Driveline evaluation:    Programming Changes: No changes made    Labs:                        9.4    16.4  )-----------( 416      ( 24 Sep 2017 02:26 )             27.5     09-24    124<L>  |  89<L>  |  13  ----------------------------<  110<H>  4.8   |  24  |  0.55    Ca    9.1      24 Sep 2017 02:26      PT/INR - ( 24 Sep 2017 02:26 )   PT: 23.0 sec;   INR: 2.08 ratio         PTT - ( 24 Sep 2017 02:26 )  PTT:31.9 sec    Lactate Dehydrogenase, Serum: 412 U/L (09-24 @ 02:26)  Lactate Dehydrogenase, Serum: 432 U/L (09-23 @ 06:44)  Lactate Dehydrogenase, Serum: 410 U/L (09-22 @ 05:19)    Lactate, Blood: 1.2 mmol/L (09-23 @ 14:38)        TELEMETRY:    [ ] Echocardiogram:    CT a/p    IMPRESSION:     Evaluation for a source of infection is limited secondary to lack of   intravenous contrast.     Status post LVAD placement.    Paraseptal emphysema.    Left lower lobe rounded atelectasis.

## 2017-09-25 DIAGNOSIS — E87.1 HYPO-OSMOLALITY AND HYPONATREMIA: ICD-10-CM

## 2017-09-25 LAB
ANION GAP SERPL CALC-SCNC: 11 MMOL/L — SIGNIFICANT CHANGE UP (ref 5–17)
BUN SERPL-MCNC: 15 MG/DL — SIGNIFICANT CHANGE UP (ref 7–23)
CALCIUM SERPL-MCNC: 9 MG/DL — SIGNIFICANT CHANGE UP (ref 8.4–10.5)
CHLORIDE SERPL-SCNC: 88 MMOL/L — LOW (ref 96–108)
CO2 SERPL-SCNC: 26 MMOL/L — SIGNIFICANT CHANGE UP (ref 22–31)
CREAT SERPL-MCNC: 0.56 MG/DL — SIGNIFICANT CHANGE UP (ref 0.5–1.3)
ERYTHROCYTE [SEDIMENTATION RATE] IN BLOOD: 106 MM/HR — HIGH (ref 0–20)
GLUCOSE SERPL-MCNC: 106 MG/DL — HIGH (ref 70–99)
HCT VFR BLD CALC: 27.7 % — LOW (ref 39–50)
HGB BLD-MCNC: 9.3 G/DL — LOW (ref 13–17)
INR BLD: 2.43 RATIO — HIGH (ref 0.88–1.16)
LDH SERPL L TO P-CCNC: 414 U/L — HIGH (ref 50–242)
MCHC RBC-ENTMCNC: 32.5 PG — SIGNIFICANT CHANGE UP (ref 27–34)
MCHC RBC-ENTMCNC: 33.6 GM/DL — SIGNIFICANT CHANGE UP (ref 32–36)
MCV RBC AUTO: 96.7 FL — SIGNIFICANT CHANGE UP (ref 80–100)
PLATELET # BLD AUTO: 467 K/UL — HIGH (ref 150–400)
POTASSIUM SERPL-MCNC: 4.7 MMOL/L — SIGNIFICANT CHANGE UP (ref 3.5–5.3)
POTASSIUM SERPL-SCNC: 4.7 MMOL/L — SIGNIFICANT CHANGE UP (ref 3.5–5.3)
PREALB SERPL-MCNC: 10 MG/DL — LOW (ref 18–45)
PROTHROM AB SERPL-ACNC: 27 SEC — HIGH (ref 9.8–12.7)
RBC # BLD: 2.87 M/UL — LOW (ref 4.2–5.8)
RBC # FLD: 13.4 % — SIGNIFICANT CHANGE UP (ref 10.3–14.5)
SODIUM SERPL-SCNC: 125 MMOL/L — LOW (ref 135–145)
VANCOMYCIN TROUGH SERPL-MCNC: 8.4 UG/ML — LOW (ref 10–20)
WBC # BLD: 19.8 K/UL — HIGH (ref 3.8–10.5)
WBC # FLD AUTO: 19.8 K/UL — HIGH (ref 3.8–10.5)

## 2017-09-25 PROCEDURE — 99232 SBSQ HOSP IP/OBS MODERATE 35: CPT

## 2017-09-25 PROCEDURE — 93750 INTERROGATION VAD IN PERSON: CPT

## 2017-09-25 PROCEDURE — 99233 SBSQ HOSP IP/OBS HIGH 50: CPT | Mod: GC

## 2017-09-25 RX ORDER — ACETAMINOPHEN 500 MG
650 TABLET ORAL EVERY 6 HOURS
Qty: 0 | Refills: 0 | Status: DISCONTINUED | OUTPATIENT
Start: 2017-09-25 | End: 2017-09-28

## 2017-09-25 RX ORDER — OXYCODONE HYDROCHLORIDE 5 MG/1
10 TABLET ORAL EVERY 12 HOURS
Qty: 0 | Refills: 0 | Status: DISCONTINUED | OUTPATIENT
Start: 2017-09-25 | End: 2017-10-02

## 2017-09-25 RX ORDER — WARFARIN SODIUM 2.5 MG/1
3 TABLET ORAL ONCE
Qty: 0 | Refills: 0 | Status: DISCONTINUED | OUTPATIENT
Start: 2017-09-25 | End: 2017-09-25

## 2017-09-25 RX ORDER — WARFARIN SODIUM 2.5 MG/1
1 TABLET ORAL ONCE
Qty: 0 | Refills: 0 | Status: COMPLETED | OUTPATIENT
Start: 2017-09-25 | End: 2017-09-25

## 2017-09-25 RX ADMIN — HYDROMORPHONE HYDROCHLORIDE 2 MILLIGRAM(S): 2 INJECTION INTRAMUSCULAR; INTRAVENOUS; SUBCUTANEOUS at 03:57

## 2017-09-25 RX ADMIN — PANTOPRAZOLE SODIUM 40 MILLIGRAM(S): 20 TABLET, DELAYED RELEASE ORAL at 06:31

## 2017-09-25 RX ADMIN — PIPERACILLIN AND TAZOBACTAM 25 GRAM(S): 4; .5 INJECTION, POWDER, LYOPHILIZED, FOR SOLUTION INTRAVENOUS at 20:59

## 2017-09-25 RX ADMIN — Medication 650 MILLIGRAM(S): at 23:38

## 2017-09-25 RX ADMIN — Medication 650 MILLIGRAM(S): at 23:08

## 2017-09-25 RX ADMIN — Medication 100 MILLIGRAM(S): at 13:46

## 2017-09-25 RX ADMIN — POLYETHYLENE GLYCOL 3350 17 GRAM(S): 17 POWDER, FOR SOLUTION ORAL at 13:46

## 2017-09-25 RX ADMIN — LISINOPRIL 7.5 MILLIGRAM(S): 2.5 TABLET ORAL at 06:32

## 2017-09-25 RX ADMIN — Medication 100 MILLIGRAM(S): at 06:32

## 2017-09-25 RX ADMIN — Medication 166.67 MILLIGRAM(S): at 05:40

## 2017-09-25 RX ADMIN — HYDROMORPHONE HYDROCHLORIDE 2 MILLIGRAM(S): 2 INJECTION INTRAMUSCULAR; INTRAVENOUS; SUBCUTANEOUS at 03:27

## 2017-09-25 RX ADMIN — OXYCODONE HYDROCHLORIDE 10 MILLIGRAM(S): 5 TABLET ORAL at 14:09

## 2017-09-25 RX ADMIN — Medication 166.67 MILLIGRAM(S): at 17:21

## 2017-09-25 RX ADMIN — HYDROMORPHONE HYDROCHLORIDE 2 MILLIGRAM(S): 2 INJECTION INTRAMUSCULAR; INTRAVENOUS; SUBCUTANEOUS at 08:47

## 2017-09-25 RX ADMIN — Medication 81 MILLIGRAM(S): at 13:46

## 2017-09-25 RX ADMIN — LACTOBACILLUS ACIDOPH-L.BULGARICUS 1 MILLION CELL CHEWABLE TABLET 1 TABLET(S): at 13:50

## 2017-09-25 RX ADMIN — Medication 100 MILLIGRAM(S): at 20:59

## 2017-09-25 RX ADMIN — PIPERACILLIN AND TAZOBACTAM 25 GRAM(S): 4; .5 INJECTION, POWDER, LYOPHILIZED, FOR SOLUTION INTRAVENOUS at 06:32

## 2017-09-25 RX ADMIN — LACTOBACILLUS ACIDOPH-L.BULGARICUS 1 MILLION CELL CHEWABLE TABLET 1 TABLET(S): at 21:00

## 2017-09-25 RX ADMIN — PIPERACILLIN AND TAZOBACTAM 25 GRAM(S): 4; .5 INJECTION, POWDER, LYOPHILIZED, FOR SOLUTION INTRAVENOUS at 13:49

## 2017-09-25 RX ADMIN — WARFARIN SODIUM 1 MILLIGRAM(S): 2.5 TABLET ORAL at 20:59

## 2017-09-25 RX ADMIN — OXYCODONE HYDROCHLORIDE 10 MILLIGRAM(S): 5 TABLET ORAL at 14:13

## 2017-09-25 RX ADMIN — HYDROMORPHONE HYDROCHLORIDE 2 MILLIGRAM(S): 2 INJECTION INTRAMUSCULAR; INTRAVENOUS; SUBCUTANEOUS at 07:47

## 2017-09-25 RX ADMIN — SENNA PLUS 2 TABLET(S): 8.6 TABLET ORAL at 20:59

## 2017-09-25 RX ADMIN — LACTOBACILLUS ACIDOPH-L.BULGARICUS 1 MILLION CELL CHEWABLE TABLET 1 TABLET(S): at 06:37

## 2017-09-25 NOTE — PROGRESS NOTE ADULT - PROBLEM SELECTOR PLAN 1
doing ok: can restart spiriva 1 puff once a day !!colleen NP on the floor   CTA: showed some rounded atelectasis on the left side as per report: ? loculated pleural effusion: [facility] ct radiologists': He has been asymptomatic though   stable  He is  having significant abdominal pain: would defer to primary team: he has been having this pain since admission: COLLEEN NP on floor

## 2017-09-25 NOTE — CHART NOTE - NSCHARTNOTEFT_GEN_A_CORE
Pt d/w pain management , suggested longer acting pain medication, oxycontin 10mg po bid, with breakthrough coverage with 5mg

## 2017-09-25 NOTE — PROGRESS NOTE ADULT - PROBLEM SELECTOR PLAN 7
appears euvolemic and not overloaded with heart failure. Please obtain uosm, ulytes, serum osm for further evaluation/ tsh checked on 9/1/17 wnl    Leann Benedict MD  CHF  [phone] (After 5pm and on weekends, please call [phone])

## 2017-09-25 NOTE — PROGRESS NOTE ADULT - SUBJECTIVE AND OBJECTIVE BOX
Patient is a 62y old  Male who presents with a chief complaint of SOOB (25 Aug 2017 22:27)    complaining of severe abdominal pain  says his breathing is affected because of pain       Any change in ROS:     MEDICATIONS  (STANDING):  aspirin enteric coated 81 milliGRAM(s) Oral daily  docusate sodium 100 milliGRAM(s) Oral three times a day  polyethylene glycol 3350 17 Gram(s) Oral daily  pantoprazole    Tablet 40 milliGRAM(s) Oral before breakfast  lisinopril 7.5 milliGRAM(s) Oral daily  lactobacillus acidophilus and bulgaricus Chewable 1 Tablet(s) Chew three times a day  vancomycin  IVPB 1250 milliGRAM(s) IV Intermittent every 12 hours  vancomycin  IVPB      piperacillin/tazobactam IVPB. 3.375 Gram(s) IV Intermittent every 8 hours  sorbitol 70% Solution 30 milliLiter(s) Oral once  senna 2 Tablet(s) Oral at bedtime    MEDICATIONS  (PRN):  HYDROmorphone   Tablet 2 milliGRAM(s) Oral every 3 hours PRN Moderate Pain (4 - 6)  HYDROmorphone   Tablet 4 milliGRAM(s) Oral every 3 hours PRN Severe Pain (7 - 10)  bisacodyl Suppository 10 milliGRAM(s) Rectal daily PRN Constipation    Vital Signs Last 24 Hrs  T(C): 37 (25 Sep 2017 08:00), Max: 37 (24 Sep 2017 12:00)  T(F): 98.6 (25 Sep 2017 08:00), Max: 98.6 (24 Sep 2017 12:00)  HR: 90 (25 Sep 2017 08:00) (84 - 94)  BP: --  BP(mean): 68 (25 Sep 2017 08:00) (68 - 74)  RR: 16 (25 Sep 2017 08:00) (16 - 18)  SpO2: 98% (25 Sep 2017 08:00) (97% - 100%)    I&O's Summary    24 Sep 2017 07:01  -  25 Sep 2017 07:00  --------------------------------------------------------  IN: 1350 mL / OUT: 1450 mL / NET: -100 mL    25 Sep 2017 07:01  -  25 Sep 2017 11:28  --------------------------------------------------------  IN: 240 mL / OUT: 300 mL / NET: -60 mL          Physical Exam:   GENERAL: NAD, well-groomed, well-developed  HEENT: ALONSO/   Atraumatic, Normocephalic  ENMT: No tonsillar erythema, exudates, or enlargement; Moist mucous membranes, Good dentition, No lesions  NECK: Supple, No JVD, Normal thyroid  CHEST/LUNG: no wheezing   CVS: Regular rate and rhythm; No murmurs, rubs, or gallops  GI: : Soft, Nontender, Nondistended; Bowel sounds present  NERVOUS SYSTEM:  Alert & Oriented X3  EXTREMITIES:  2+ Peripheral Pulses, No clubbing, cyanosis, or edema  LYMPH: No lymphadenopathy noted  SKIN: No rashes or lesions  ENDOCRINOLOGY: No Thyromegaly  PSYCH: Appropriate    Labs:                              9.3    19.8  )-----------( 467      ( 25 Sep 2017 05:41 )             27.7                         9.4    16.4  )-----------( 416      ( 24 Sep 2017 02:26 )             27.5                         9.3    19.2  )-----------( 382      ( 23 Sep 2017 06:44 )             27.5                         9.6    17.5  )-----------( 345      ( 22 Sep 2017 05:19 )             27.6                         9.4    16.4  )-----------( 329      ( 21 Sep 2017 23:34 )             27.8     09-25    125<L>  |  88<L>  |  15  ----------------------------<  106<H>  4.7   |  26  |  0.56  09-24    124<L>  |  89<L>  |  13  ----------------------------<  110<H>  4.8   |  24  |  0.55  09-23    124<L>  |  88<L>  |  16  ----------------------------<  100<H>  5.2   |  26  |  0.52  09-22    127<L>  |  89<L>  |  21  ----------------------------<  105<H>  4.7   |  28  |  0.51    Ca    9.0      25 Sep 2017 05:41  Ca    9.1      24 Sep 2017 02:26      CAPILLARY BLOOD GLUCOSE            PT/INR - ( 25 Sep 2017 05:41 )   PT: 27.0 sec;   INR: 2.43 ratio         PTT - ( 24 Sep 2017 02:26 )  PTT:31.9 sec    Procalcitonin, Serum: 0.14 ng/mL (09-23 @ 18:03)  Lactate, Blood: 1.2 mmol/L (09-23 @ 14:38)  Cultures:     Vancomycin Trough:     09-25 @ 05:41  8.4    Gentamicin:          09-25 @ 05:41  --          < from: CT Chest No Cont (09.23.17 @ 12:44) >  OWEL: No bowel obstruction.   PERITONEUM: No ascites. Mild mesenteric edema.  VESSELS:  Atherosclerotic changes of the abdominal aorta andbilateral   iliac vessels.  RETROPERITONEUM: No lymphadenopathy.    ABDOMINAL WALL: Mild anasarca.  BONES: Mild degenerative changes of the visualized spine, worse at L4-5   level.    IMPRESSION:     Evaluation for a source of infection is limited secondary to lack of   intravenous contrast.     Status post LVAD placement.    Paraseptal emphysema.    Left lower lobe rounded atelectasis.                MARIANN JARRELL M.D., RADIOLOGY RESIDENT  This document has been electronically signed.  CANDIDO WISDOM M.D., ATTENDING RADIOLOGIST  This document has been electronically signed. Sep 23 2017  2:33PM    < end of copied text >                    Studies  Chest X-RAY  CT SCAN Chest   Venous Dopplers: LE:   CT Abdomen  Others

## 2017-09-25 NOTE — PROGRESS NOTE ADULT - ASSESSMENT
63 yo male with no PMHx presented with sob and abdominal pain  on 8/25/17.  Cta - for PE,  cardiac mRI and cath revealed   lv dysfunction and MR.  Cardiology was consulted, HF service consulted and the pt was placed in ccu with pulmonary edema. He had  low cardiac output and cardiogenic shock, and was  referred  for LVAD evaluation. ID was consulted to clear the patient for surgery.. GI was following for abd pain, no significant findings on virtual colonoscopy. He was treated for chf  with  afterload reduction  and milrinone and stabilized, euvolemic .  9/12/17 S/P Implantation of HeartMate II left ventricular assist device through median sternotomy, and repair of the tricuspid valve with a number 28 classic Christianson ring.  Post op he required inotropic support and PRBc's for acute blood loss anemia  He was transferred to sdu  9/18  He has c/o pain at the incision site and chest tube site  Chest tube to be d/c  9/20  Pain improved .  9/21 INR drifted to 1.8, Heparin started.  Lisinopril increased for BP and afterload  Leukocytosis, afebrile  Hyponatremia, off lasix  9/23 Leukocytosis WBC 19  CT chest/ab/plevis ordered  blood cx x2  ID consulted fluid bolus .9  per Dr Iyer low sodium, diet d/c regular diet ordered INR 2.08 heparin gtt d/c coumadin 5 tonight  9/24 cta done 2nd limited study prior.  Abdominal discomfort relieved with pain meds, d/w pain service, oxycontin recommended .  Ct scan with contrast reviewed by Dr Cadet.

## 2017-09-25 NOTE — PROGRESS NOTE ADULT - SUBJECTIVE AND OBJECTIVE BOX
24H hour events:   no events  some pain at driveline site    tele: sinus 90    MEDICATIONS:  aspirin enteric coated 81 milliGRAM(s) Oral daily  lisinopril 7.5 milliGRAM(s) Oral daily  warfarin 3 milliGRAM(s) Oral once    vancomycin  IVPB 1250 milliGRAM(s) IV Intermittent every 12 hours  vancomycin  IVPB      piperacillin/tazobactam IVPB. 3.375 Gram(s) IV Intermittent every 8 hours      oxyCODONE  ER Tablet 10 milliGRAM(s) Oral every 12 hours    docusate sodium 100 milliGRAM(s) Oral three times a day  polyethylene glycol 3350 17 Gram(s) Oral daily  pantoprazole    Tablet 40 milliGRAM(s) Oral before breakfast  bisacodyl Suppository 10 milliGRAM(s) Rectal daily PRN  sorbitol 70% Solution 30 milliLiter(s) Oral once  senna 2 Tablet(s) Oral at bedtime          REVIEW OF SYSTEMS:  General: no fatigue/malaise, weight loss/gain.  Skin: no rashes.  Ophthalmologic: no blurred vision, no loss of vision. 	  ENT: no sore throat, rhinorrhea, sinus congestion.  Respiratory: no SOB, cough or wheeze.  Gastrointestinal:  no N/V/D, no melena/hematemesis/hematochezia.  Genitourinary: no dysuria/hesitancy or hematuria.  Musculoskeletal: no myalgias or arthralgias.  Neurological: no changes in vision or hearing, no lightheadedness/dizziness, no syncope/near syncope	  Psychiatric: no unusual stress/anxiety.   Hematology/Lymphatics: no unusual bleeding, bruising and no lymphadenopathy.  Endocrine: no unusual thirst.   All others negative except as stated above and in HPI.    PHYSICAL EXAM:  T(C): 37 (09-25-17 @ 12:08), Max: 37 (09-24-17 @ 19:00)  HR: 90 (09-25-17 @ 12:08) (90 - 94)  BP: --  RR: 16 (09-25-17 @ 12:08) (16 - 18)  SpO2: 98% (09-25-17 @ 12:08) (97% - 100%)  Wt(kg): --  I&O's Summary    24 Sep 2017 07:01  -  25 Sep 2017 07:00  --------------------------------------------------------  IN: 1350 mL / OUT: 1450 mL / NET: -100 mL    25 Sep 2017 07:01  -  25 Sep 2017 13:24  --------------------------------------------------------  IN: 240 mL / OUT: 500 mL / NET: -260 mL        Appearance: Normal	  HEENT:   Normal oral mucosa, PERRL, EOMI	  Lymphatic: No lymphadenopathy  Cardiovascular: low pitch hum of lvad, unabnle to appreciate s1s2, drive line with slightly bloody dressing   Respiratory: Lungs clear to auscultation	  Psychiatry: A & O x 3, Mood & affect appropriate  Gastrointestinal:  Soft, Non-tender, + BS	  Skin: No rashes, No ecchymoses, No cyanosis	  Neurologic: Non-focal  Extremities: Normal range of motion, No clubbing, cyanosis or edema  Vascular: Peripheral pulses palpable 2+ bilaterally        LABS:	 	    CBC Full  -  ( 25 Sep 2017 05:41 )  WBC Count : 19.8 K/uL  Hemoglobin : 9.3 g/dL  Hematocrit : 27.7 %  Platelet Count - Automated : 467 K/uL  Mean Cell Volume : 96.7 fl  Mean Cell Hemoglobin : 32.5 pg  Mean Cell Hemoglobin Concentration : 33.6 gm/dL  Auto Neutrophil # : x  Auto Lymphocyte # : x  Auto Monocyte # : x  Auto Eosinophil # : x  Auto Basophil # : x  Auto Neutrophil % : x  Auto Lymphocyte % : x  Auto Monocyte % : x  Auto Eosinophil % : x  Auto Basophil % : x    09-25    125<L>  |  88<L>  |  15  ----------------------------<  106<H>  4.7   |  26  |  0.56  09-24    124<L>  |  89<L>  |  13  ----------------------------<  110<H>  4.8   |  24  |  0.55    Ca    9.0      25 Sep 2017 05:41  Ca    9.1      24 Sep 2017 02:26    < from: CT Angio Abdomen and Pelvis w/ IV Cont (09.24.17 @ 17:11) >  UNGS AND LARGE AIRWAYS: Patent central airways. Paraseptal emphysema.   Ground glass opacities in the left lower lobe.  PLEURA: Small loculated left pleural effusion, unchanged.  VESSELS: No aortic dissection. No aneurysm. No central pulmonary embolism.  HEART: Statuspost LVAD and mitral valve annuloplasty. Heart size is   enlarged. No pericardial effusion. Small amount calcified plaque   involving the left anterior descending and left circumflex coronary   arteries.  MEDIASTINUM AND DON: No lymphadenopathy.  CHEST WALL AND LOWER NECK: Within normal limits.    ABDOMEN AND PELVIS:    LIVER: Within normal limits.  BILE DUCTS: Normal caliber.  GALLBLADDER: Within normal limits.  SPLEEN: Within normal limits.  PANCREAS: Within normal limits.  ADRENALS: Within normal limits.  KIDNEYS/URETERS: Within normal limits.    BLADDER: Slightly distended with small amount of nondependent air.  REPRODUCTIVE ORGANS: The prostate is within normal limits.    BOWEL: No bowel obstruction.   PERITONEUM: No ascites.  VESSELS:Atherosclerotic calcifications. Normal patency of the abdominal   aorta, no aneurysm.  RETROPERITONEUM: No lymphadenopathy.    ABDOMINAL WALL: Within normal limits.  BONES: Status post sternotomy. Degenerative spinal changes most prominent   at the level of L4 and L5 with disc height loss and mild retrolisthesis   of L4 on L5.    IMPRESSION:     Unchanged small left loculated pleural effusion with associated   compressive atelectasis of the left lower lobe.    Small amount of air within the bladder. Correlate for recent   instrumentation and with urinalysis to exclude infection.        < end of copied text >

## 2017-09-25 NOTE — PROGRESS NOTE ADULT - SUBJECTIVE AND OBJECTIVE BOX
im ok right now    VITAL SIGNS    Telemetry:       Vital Signs Last 24 Hrs  T(C): 37 (17 @ 12:08), Max: 37 (17 @ 19:00)  T(F): 98.6 (17 @ 12:08), Max: 98.6 (17 @ 19:00)  HR: 90 (17 @ 12:08) (90 - 94)  BP: -- 66-74  RR: 16 (17 @ 12:08) (16 - 18)  SpO2: 98% (17 @ 12:08) (97% - 100%)                    @ 07:01  -   @ 07:00  --------------------------------------------------------  IN: 1350 mL / OUT: 1450 mL / NET: -100 mL     @ 07:01  -   @ 12:35  --------------------------------------------------------  IN: 240 mL / OUT: 500 mL / NET: -260 mL          Daily     Daily Weight in k.6 (25 Sep 2017 11:53)        CAPILLARY BLOOD GLUCOSE                      Coumadin    [ x] YES          [  ]      NO         Reason:     lvad                          PHYSICAL EXAM        Neurology: alert and oriented x 3, nonfocal, no gross deficits  CV : S1 S2 , RRR   Sternal Wound :  CDI , Stable  Lungs: cta  Drains:    driveline dressing old blood  Abdomen: soft, nontender, nondistended, positive bowel sounds, last bowel movement   :           voiding  Extremities:     -edema - calve tenderness          aspirin enteric coated 81 milliGRAM(s) Oral daily  docusate sodium 100 milliGRAM(s) Oral three times a day  polyethylene glycol 3350 17 Gram(s) Oral daily  pantoprazole    Tablet 40 milliGRAM(s) Oral before breakfast  bisacodyl Suppository 10 milliGRAM(s) Rectal daily PRN  lisinopril 7.5 milliGRAM(s) Oral daily  lactobacillus acidophilus and bulgaricus Chewable 1 Tablet(s) Chew three times a day  vancomycin  IVPB 1250 milliGRAM(s) IV Intermittent every 12 hours  vancomycin  IVPB      piperacillin/tazobactam IVPB. 3.375 Gram(s) IV Intermittent every 8 hours  sorbitol 70% Solution 30 milliLiter(s) Oral once  senna 2 Tablet(s) Oral at bedtime  oxyCODONE  ER Tablet 10 milliGRAM(s) Oral every 12 hours                    Physical Therapy Rec:   Home  [  ]   Home w/ PT  [  ]  Rehab  [  ]  Discussed with Cardiothoracic Team at AM rounds.

## 2017-09-25 NOTE — PROGRESS NOTE ADULT - PROBLEM SELECTOR PLAN 3
INR 2.08 today (Goal 2-2.5). receiving coumadin 3mg  Coumadin now, can stop heparin bridge.    Continue aspirin 81 mg daily

## 2017-09-25 NOTE — PROGRESS NOTE ADULT - ATTENDING COMMENTS
Still with significant L chest/abdom pain.  , WBC 20; CT C/A/P was unrevealing.  Will have IR drain L loculated pleural effusion, but I doubt it is responsible for his pain.  Continue abx for now.

## 2017-09-25 NOTE — PROGRESS NOTE ADULT - PROBLEM SELECTOR PLAN 4
INR 2.4 today (Goal 2-2.5)   Will give 3 mg of coumadin again tonight.   Continue aspirin 81 mg daily

## 2017-09-25 NOTE — PROGRESS NOTE ADULT - SUBJECTIVE AND OBJECTIVE BOX
INFECTIOUS DISEASES FOLLOW UP-- Anitra Prater  [phone]    This is a follow up note for this  62yMale with s/p LVAd placement on 9/12/17 with leukocytosis and compalints of pain on his left side lower chest upper abdomen area. Over the weekend he was started on Vanco and Zosyn      ROS:  CONSTITUTIONAL:  No fever, good appetite  CARDIOVASCULAR:  No chest pain or palpitations  RESPIRATORY:  No dyspnea  GASTROINTESTINAL:  No nausea, vomiting, diarrhea, or abdominal pain  GENITOURINARY:  No dysuria  NEUROLOGIC:  No headache,     Allergies    No Known Allergies    Intolerances        ANTIBIOTICS/RELEVANT:  antimicrobials  vancomycin  IVPB 1250 milliGRAM(s) IV Intermittent every 12 hours  vancomycin  IVPB      piperacillin/tazobactam IVPB. 3.375 Gram(s) IV Intermittent every 8 hours    immunologic:    OTHER:  aspirin enteric coated 81 milliGRAM(s) Oral daily  docusate sodium 100 milliGRAM(s) Oral three times a day  polyethylene glycol 3350 17 Gram(s) Oral daily  pantoprazole    Tablet 40 milliGRAM(s) Oral before breakfast  bisacodyl Suppository 10 milliGRAM(s) Rectal daily PRN  lisinopril 7.5 milliGRAM(s) Oral daily  lactobacillus acidophilus and bulgaricus Chewable 1 Tablet(s) Chew three times a day  sorbitol 70% Solution 30 milliLiter(s) Oral once  senna 2 Tablet(s) Oral at bedtime  oxyCODONE  ER Tablet 10 milliGRAM(s) Oral every 12 hours  warfarin 1 milliGRAM(s) Oral once      Objective:  Vital Signs Last 24 Hrs  T(C): 36.8 (25 Sep 2017 19:07), Max: 37 (24 Sep 2017 23:14)  T(F): 98.2 (25 Sep 2017 19:07), Max: 98.6 (24 Sep 2017 23:14)  HR: 90 (25 Sep 2017 19:07) (90 - 95)  BP: --  BP(mean): 66 (25 Sep 2017 17:00) (62 - 74)  RR: 18 (25 Sep 2017 17:00) (16 - 18)  SpO2: 96% (25 Sep 2017 17:00) (96% - 100%)    PHYSICAL EXAM:  Constitutional:no acute distress  Eyes:ALONSO, EOMI  Ear/Nose/Throat: no oral lesions, 	  Respiratory: dimisinished BS at the left base  Cardiovascular: S1S2 LVAD sounds  Gastrointestinal:soft, (+) BS, tender LUQ and chest  Extremities:no e/e/c  No Lymphadenopathy  IV sites not inflammed.    LABS:                        9.3    19.8  )-----------( 467      ( 25 Sep 2017 05:41 )             27.7     09-25    125<L>  |  88<L>  |  15  ----------------------------<  106<H>  4.7   |  26  |  0.56    Ca    9.0      25 Sep 2017 05:41      PT/INR - ( 25 Sep 2017 05:41 )   PT: 27.0 sec;   INR: 2.43 ratio         PTT - ( 24 Sep 2017 02:26 )  PTT:31.9 sec      MICROBIOLOGY:  Culture - Blood (09.23.17 @ 09:54)    Specimen Source: .Blood Blood-Peripheral    Culture Results:   No growth to date.    Culture - Blood (09.23.17 @ 09:54)    Specimen Source: .Blood Blood-Peripheral    Culture Results:   No growth to date.              RECENT CULTURES:      RADIOLOGY & ADDITIONAL STUDIES:    < from: CT Angio Abdomen and Pelvis w/ IV Cont (09.24.17 @ 17:11) >    IMPRESSION:     Unchanged small left loculated pleural effusion with associated   compressive atelectasis of the left lower lobe.    Small amount of air within the bladder. Correlate for recent   instrumentation and with urinalysis to exclude infection.    < end of copied text >

## 2017-09-25 NOTE — PROGRESS NOTE ADULT - ASSESSMENT
63 yo man s/p LVAd placement 9/12/17 with leukocytosis and left sided pain. Unclear if his pain and leukocytosis are related to the left sided effusion. Given the persistence is both would favor a diagnostic thoracentesis with IR guidance to r/o infection source  Continue the Vancomycin and Zosyn pending cultures results

## 2017-09-26 LAB
ANION GAP SERPL CALC-SCNC: 11 MMOL/L — SIGNIFICANT CHANGE UP (ref 5–17)
APTT BLD: 32 SEC — SIGNIFICANT CHANGE UP (ref 27.5–37.4)
BASOPHILS # BLD AUTO: 0 K/UL — SIGNIFICANT CHANGE UP (ref 0–0.2)
BASOPHILS NFR BLD AUTO: 0.1 % — SIGNIFICANT CHANGE UP (ref 0–2)
BUN SERPL-MCNC: 18 MG/DL — SIGNIFICANT CHANGE UP (ref 7–23)
CALCIUM SERPL-MCNC: 9 MG/DL — SIGNIFICANT CHANGE UP (ref 8.4–10.5)
CHLORIDE SERPL-SCNC: 88 MMOL/L — LOW (ref 96–108)
CO2 SERPL-SCNC: 25 MMOL/L — SIGNIFICANT CHANGE UP (ref 22–31)
CREAT SERPL-MCNC: 0.65 MG/DL — SIGNIFICANT CHANGE UP (ref 0.5–1.3)
EOSINOPHIL # BLD AUTO: 0.4 K/UL — SIGNIFICANT CHANGE UP (ref 0–0.5)
EOSINOPHIL NFR BLD AUTO: 2.2 % — SIGNIFICANT CHANGE UP (ref 0–6)
GLUCOSE SERPL-MCNC: 93 MG/DL — SIGNIFICANT CHANGE UP (ref 70–99)
HCT VFR BLD CALC: 25.3 % — LOW (ref 39–50)
HGB BLD-MCNC: 8.7 G/DL — LOW (ref 13–17)
INR BLD: 1.93 RATIO — HIGH (ref 0.88–1.16)
INR BLD: 2.17 RATIO — HIGH (ref 0.88–1.16)
LDH SERPL L TO P-CCNC: 424 U/L — HIGH (ref 50–242)
LYMPHOCYTES # BLD AUTO: 1.2 K/UL — SIGNIFICANT CHANGE UP (ref 1–3.3)
LYMPHOCYTES # BLD AUTO: 7.1 % — LOW (ref 13–44)
MCHC RBC-ENTMCNC: 33.5 PG — SIGNIFICANT CHANGE UP (ref 27–34)
MCHC RBC-ENTMCNC: 34.5 GM/DL — SIGNIFICANT CHANGE UP (ref 32–36)
MCV RBC AUTO: 97.2 FL — SIGNIFICANT CHANGE UP (ref 80–100)
MONOCYTES # BLD AUTO: 1.3 K/UL — HIGH (ref 0–0.9)
MONOCYTES NFR BLD AUTO: 8.1 % — SIGNIFICANT CHANGE UP (ref 2–14)
NEUTROPHILS # BLD AUTO: 13.6 K/UL — HIGH (ref 1.8–7.4)
NEUTROPHILS NFR BLD AUTO: 82.6 % — HIGH (ref 43–77)
PLATELET # BLD AUTO: 456 K/UL — HIGH (ref 150–400)
POTASSIUM SERPL-MCNC: 4.5 MMOL/L — SIGNIFICANT CHANGE UP (ref 3.5–5.3)
POTASSIUM SERPL-SCNC: 4.5 MMOL/L — SIGNIFICANT CHANGE UP (ref 3.5–5.3)
PROTHROM AB SERPL-ACNC: 21.3 SEC — HIGH (ref 9.8–12.7)
PROTHROM AB SERPL-ACNC: 23.8 SEC — HIGH (ref 9.8–12.7)
RBC # BLD: 2.6 M/UL — LOW (ref 4.2–5.8)
RBC # FLD: 13.3 % — SIGNIFICANT CHANGE UP (ref 10.3–14.5)
SODIUM SERPL-SCNC: 124 MMOL/L — LOW (ref 135–145)
WBC # BLD: 16.5 K/UL — HIGH (ref 3.8–10.5)
WBC # FLD AUTO: 16.5 K/UL — HIGH (ref 3.8–10.5)

## 2017-09-26 PROCEDURE — 99233 SBSQ HOSP IP/OBS HIGH 50: CPT

## 2017-09-26 RX ORDER — LISINOPRIL 2.5 MG/1
5 TABLET ORAL DAILY
Qty: 0 | Refills: 0 | Status: DISCONTINUED | OUTPATIENT
Start: 2017-09-27 | End: 2017-10-07

## 2017-09-26 RX ORDER — WARFARIN SODIUM 2.5 MG/1
5 TABLET ORAL ONCE
Qty: 0 | Refills: 0 | Status: DISCONTINUED | OUTPATIENT
Start: 2017-09-26 | End: 2017-09-26

## 2017-09-26 RX ORDER — HEPARIN SODIUM 5000 [USP'U]/ML
1100 INJECTION INTRAVENOUS; SUBCUTANEOUS
Qty: 25000 | Refills: 0 | Status: DISCONTINUED | OUTPATIENT
Start: 2017-09-26 | End: 2017-09-28

## 2017-09-26 RX ORDER — SODIUM CHLORIDE 9 MG/ML
250 INJECTION INTRAMUSCULAR; INTRAVENOUS; SUBCUTANEOUS ONCE
Qty: 0 | Refills: 0 | Status: COMPLETED | OUTPATIENT
Start: 2017-09-26 | End: 2017-09-26

## 2017-09-26 RX ORDER — WARFARIN SODIUM 2.5 MG/1
1 TABLET ORAL ONCE
Qty: 0 | Refills: 0 | Status: COMPLETED | OUTPATIENT
Start: 2017-09-26 | End: 2017-09-26

## 2017-09-26 RX ADMIN — SODIUM CHLORIDE 500 MILLILITER(S): 9 INJECTION INTRAMUSCULAR; INTRAVENOUS; SUBCUTANEOUS at 12:44

## 2017-09-26 RX ADMIN — Medication 81 MILLIGRAM(S): at 14:27

## 2017-09-26 RX ADMIN — HEPARIN SODIUM 11 UNIT(S)/HR: 5000 INJECTION INTRAVENOUS; SUBCUTANEOUS at 21:00

## 2017-09-26 RX ADMIN — LACTOBACILLUS ACIDOPH-L.BULGARICUS 1 MILLION CELL CHEWABLE TABLET 1 TABLET(S): at 05:52

## 2017-09-26 RX ADMIN — POLYETHYLENE GLYCOL 3350 17 GRAM(S): 17 POWDER, FOR SOLUTION ORAL at 14:05

## 2017-09-26 RX ADMIN — PIPERACILLIN AND TAZOBACTAM 25 GRAM(S): 4; .5 INJECTION, POWDER, LYOPHILIZED, FOR SOLUTION INTRAVENOUS at 21:00

## 2017-09-26 RX ADMIN — PIPERACILLIN AND TAZOBACTAM 25 GRAM(S): 4; .5 INJECTION, POWDER, LYOPHILIZED, FOR SOLUTION INTRAVENOUS at 14:05

## 2017-09-26 RX ADMIN — Medication 166.67 MILLIGRAM(S): at 04:51

## 2017-09-26 RX ADMIN — PANTOPRAZOLE SODIUM 40 MILLIGRAM(S): 20 TABLET, DELAYED RELEASE ORAL at 04:48

## 2017-09-26 RX ADMIN — LACTOBACILLUS ACIDOPH-L.BULGARICUS 1 MILLION CELL CHEWABLE TABLET 1 TABLET(S): at 14:28

## 2017-09-26 RX ADMIN — PIPERACILLIN AND TAZOBACTAM 25 GRAM(S): 4; .5 INJECTION, POWDER, LYOPHILIZED, FOR SOLUTION INTRAVENOUS at 04:52

## 2017-09-26 RX ADMIN — OXYCODONE HYDROCHLORIDE 10 MILLIGRAM(S): 5 TABLET ORAL at 04:49

## 2017-09-26 RX ADMIN — OXYCODONE HYDROCHLORIDE 10 MILLIGRAM(S): 5 TABLET ORAL at 21:28

## 2017-09-26 RX ADMIN — OXYCODONE HYDROCHLORIDE 10 MILLIGRAM(S): 5 TABLET ORAL at 05:19

## 2017-09-26 RX ADMIN — WARFARIN SODIUM 1 MILLIGRAM(S): 2.5 TABLET ORAL at 20:58

## 2017-09-26 RX ADMIN — LISINOPRIL 7.5 MILLIGRAM(S): 2.5 TABLET ORAL at 04:49

## 2017-09-26 RX ADMIN — OXYCODONE HYDROCHLORIDE 10 MILLIGRAM(S): 5 TABLET ORAL at 20:58

## 2017-09-26 RX ADMIN — Medication 166.67 MILLIGRAM(S): at 21:43

## 2017-09-26 RX ADMIN — Medication 100 MILLIGRAM(S): at 14:27

## 2017-09-26 NOTE — PROGRESS NOTE ADULT - SUBJECTIVE AND OBJECTIVE BOX
Behavioral Cardiology Progress Note     HPI: Mr. Quispe is a 62 year old man with nonischemic cardiomyopathy, ACC/AHA stage D congestive heart failure who presented with abdominal pain due to cardiogenic shock, now s/p Heart Mate II LVAD with TV annuloplasty ring on 9/12/17. His postoperative course was complicated by bleeding requiring transfusions, which has stabilitzed. He currently is euvolemic without evidence of right heart failure. His INR is slightly subtherapeutic and his MAPs are at goal. He reports discomfort on L side with examination.      Behavioral Health Assessment:    phone ID: [id number]    Mood:  "I’m not happy because I have pain."           Current stressors:   Adjustment to LVAD   Hospitalization    Pain management     Support system/family support:   Good family support (sister and her family, niece, nephews, cousins, sister).      Coping strategies:    Limited; reports he tends to keep things to himself, reluctant to ask others for help.  Family willing to provide support as needed.      Understanding of medical illness and treatment plan:   Continues with process of LVAD education with LVAD coordinator.        MSE:   Seen resting in bed.  A&Ox3.  Fairly related with intermittent eye contact, kept his eyes closed for periods during assessment. Thought process goal directed.  No abnormal thought content, denies s/i.  Mood: dysphoric.  Affect anxious.  Insight and judgment adequate.

## 2017-09-26 NOTE — PROGRESS NOTE ADULT - SUBJECTIVE AND OBJECTIVE BOX
Subjective:    Medications:  aspirin enteric coated 81 milliGRAM(s) Oral daily  docusate sodium 100 milliGRAM(s) Oral three times a day  polyethylene glycol 3350 17 Gram(s) Oral daily  pantoprazole    Tablet 40 milliGRAM(s) Oral before breakfast  bisacodyl Suppository 10 milliGRAM(s) Rectal daily PRN  lisinopril 7.5 milliGRAM(s) Oral daily  lactobacillus acidophilus and bulgaricus Chewable 1 Tablet(s) Chew three times a day  vancomycin  IVPB 1250 milliGRAM(s) IV Intermittent every 12 hours  vancomycin  IVPB      piperacillin/tazobactam IVPB. 3.375 Gram(s) IV Intermittent every 8 hours  sorbitol 70% Solution 30 milliLiter(s) Oral once  senna 2 Tablet(s) Oral at bedtime  oxyCODONE  ER Tablet 10 milliGRAM(s) Oral every 12 hours  acetaminophen   Tablet. 650 milliGRAM(s) Oral every 6 hours PRN      PHYSICAL EXAM:  Vital Signs Last 24 Hrs  T(C): 36.4 (26 Sep 2017 07:30), Max: 37 (25 Sep 2017 12:08)  T(F): 97.5 (26 Sep 2017 07:30), Max: 98.6 (25 Sep 2017 12:08)  HR: 91 (26 Sep 2017 07:30) (90 - 95)  BP: --  BP(mean): 64 (26 Sep 2017 07:30) (62 - 78)  RR: 19 (26 Sep 2017 07:30) (16 - 19)  SpO2: 96% (26 Sep 2017 04:00) (96% - 100%)  Daily     Daily Weight in k.6 (25 Sep 2017 11:53)  I&O's Detail    25 Sep 2017 07:01  -  26 Sep 2017 07:00  --------------------------------------------------------  IN:    Oral Fluid: 360 mL    Solution: 350 mL  Total IN: 710 mL    OUT:    Voided: 1850 mL  Total OUT: 1850 mL    Total NET: -1140 mL          General: A/ox 3, No acute Distress  Neck: Supple,JVD  Cardiac: S1 S2, No M/R/G  Pulmonary: CTAB, Breathing unlabored, No Rhonchi/Rales/Wheezing  Abdomen: Soft, Non -tender, +BS   Extremities: No Rashes, No edema  Neuro: A/o x 3, No focal deficits  Psch: normal mood , normal affect    LABS:  cret                        8.7    16.5  )-----------( 456      ( 26 Sep 2017 05:40 )             25.3     09-26    124<L>  |  88<L>  |  18  ----------------------------<  93  4.5   |  25  |  0.65    Ca    9.0      26 Sep 2017 05:40      PT/INR - ( 26 Sep 2017 05:40 )   PT: 23.8 sec;   INR: 2.17 ratio Subjective: Pt sitting in chair sleeping once awoken pt reports discomfort     Medications:  aspirin enteric coated 81 milliGRAM(s) Oral daily  docusate sodium 100 milliGRAM(s) Oral three times a day  polyethylene glycol 3350 17 Gram(s) Oral daily  pantoprazole    Tablet 40 milliGRAM(s) Oral before breakfast  bisacodyl Suppository 10 milliGRAM(s) Rectal daily PRN  lisinopril 7.5 milliGRAM(s) Oral daily  lactobacillus acidophilus and bulgaricus Chewable 1 Tablet(s) Chew three times a day  vancomycin  IVPB 1250 milliGRAM(s) IV Intermittent every 12 hours  piperacillin/tazobactam IVPB. 3.375 Gram(s) IV Intermittent every 8 hours  sorbitol 70% Solution 30 milliLiter(s) Oral once  senna 2 Tablet(s) Oral at bedtime  oxyCODONE  ER Tablet 10 milliGRAM(s) Oral every 12 hours  acetaminophen   Tablet. 650 milliGRAM(s) Oral every 6 hours PRN      PHYSICAL EXAM:  Vital Signs Last 24 Hrs  T(C): 36.4 (26 Sep 2017 07:30), Max: 37 (25 Sep 2017 12:08)  T(F): 97.5 (26 Sep 2017 07:30), Max: 98.6 (25 Sep 2017 12:08)  HR: 91 (26 Sep 2017 07:30) (90 - 95)  BP(mean): 64 (26 Sep 2017 07:30) (62 - 78)  RR: 19 (26 Sep 2017 07:30) (16 - 19)  SpO2: 96% (26 Sep 2017 04:00) (96% - 100%)  Daily Weight in k.6 (25 Sep 2017 11:53)    25 Sep 2017 07:01  -  26 Sep 2017 07:00  --------------------------------------------------------  IN:    Oral Fluid: 360 mL    Solution: 350 mL  Total IN: 710 mL    OUT:    Voided: 1850 mL  Total OUT: 1850 mL    Total NET: -1140 mL    General: A/ox 3,    Neck: Supple,JVD  Cardiac: S1 S2,LVAD sounds   Pulmonary: CTAB, Breathing unlabored, decreased L > R  No Rhonchi/Rales/Wheezing  Abdomen: Soft, Non -tender, +BS   Extremities: No Rashes, No edema  Neuro: A/o x 3, No focal deficits  Psch: normal mood , normal affect  LVAD Interrogation:  Pump Flow:6.5  Pump Speed: 9000  Pulse Index:4.8  Pump Power:5.9  VAD Events:   Driveline evaluation:  no drainage mld discomfort site clean and dry                             8.7    16.5  )-----------( 456      ( 26 Sep 2017 05:40 )             25.3     09-26    124<L>  |  88<L>  |  18  ----------------------------<  93  4.5   |  25  |  0.65    Ca    9.0      26 Sep 2017 05:40      PT/INR - ( 26 Sep 2017 05:40 )   PT: 23.8 sec;   INR: 2.17 ratio         ( 414)   sed rate 106   CM sr 80 - 100 pvcs

## 2017-09-26 NOTE — PROGRESS NOTE ADULT - PROBLEM SELECTOR PLAN 1
stable: His CT scan is reviewed: small loculated effusion: doubt any infection, for possible diagnostic tap today, to rule out any infection: He had small pleural effusion on the left side on 8/25 ct scan chest : This seems to be simple loculated effusion ( secondary to chronicity): The etiology of abdominal pain Is not clear: , though it is chronic!!

## 2017-09-26 NOTE — PROGRESS NOTE ADULT - SUBJECTIVE AND OBJECTIVE BOX
Im ok    VITAL SIGNS    Telemetry:  NSR 80    Vital Signs Last 24 Hrs  T(C): 37 (17 @ 10:45), Max: 37 (17 @ 12:08)  T(F): 98.6 (17 @ 10:45), Max: 98.6 (17 @ 12:08)  HR: 94 (17 @ 10:45) (90 - 95)  BP: -- 64-80  RR: 16 (17 @ 10:45) (16 - 19)  SpO2: 95% (17 @ 10:45) (95% - 100%)                    @ 07:01  -   @ 07:00  --------------------------------------------------------  IN: 710 mL / OUT: 1850 mL / NET: -1140 mL     @ 07:01  -   @ 12:06  --------------------------------------------------------  IN: 240 mL / OUT: 0 mL / NET: 240 mL          Daily     Daily Weight in k.7 (26 Sep 2017 10:56)        CAPILLARY BLOOD GLUCOSE                      Coumadin    [ x] YES          [  ]      NO         Reason:                               PHYSICAL EXAM        Neurology: alert and oriented x 3, nonfocal, no gross deficits  CV :+ hum  Sternal Wound :  CDI , Stable  Lungs: diminshed bases l>r  Abdomen: soft, nontender, nondistended, positive bowel sounds,   :     voiding        Extremities:   -edema - calve tenderness           aspirin enteric coated 81 milliGRAM(s) Oral daily  docusate sodium 100 milliGRAM(s) Oral three times a day  polyethylene glycol 3350 17 Gram(s) Oral daily  pantoprazole    Tablet 40 milliGRAM(s) Oral before breakfast  bisacodyl Suppository 10 milliGRAM(s) Rectal daily PRN  lisinopril 7.5 milliGRAM(s) Oral daily  lactobacillus acidophilus and bulgaricus Chewable 1 Tablet(s) Chew three times a day  vancomycin  IVPB 1250 milliGRAM(s) IV Intermittent every 12 hours  vancomycin  IVPB      piperacillin/tazobactam IVPB. 3.375 Gram(s) IV Intermittent every 8 hours  sorbitol 70% Solution 30 milliLiter(s) Oral once  senna 2 Tablet(s) Oral at bedtime  oxyCODONE  ER Tablet 10 milliGRAM(s) Oral every 12 hours  acetaminophen   Tablet. 650 milliGRAM(s) Oral every 6 hours PRN                    Physical Therapy Rec:   Home  [  ]   Home w/ PT  [  ]  Rehab  [x  ]  Discussed with Cardiothoracic Team at AM rounds.

## 2017-09-26 NOTE — CHART NOTE - NSCHARTNOTEFT_GEN_A_CORE
Chart reviewed- pt with NICM, cardiogenic shock, s/p LVAD (heartmate II) and TV repair on 9/12, loculated LLL effusion vs atelectasis.     Source: Patient [ x ]    Family [ ]     other [ x ] Comprehensive chart review, RN    Per RN, pt with decreased appetite and PO intake. Observed pt consumed 100% of sandwich at lunch and states he drinks 1 Ensure Enlive daily. Denies GI distress. Pt with minimal interest in interview and refused diet education at this time.    Diet : Regular, Ensure Enlive 3 cans daily.      Source for PO intake [ x ] Patient [ ] family [ ] chart [ x ] staff [ ] other      Admission Weight: 66.1kg  Current Weight: 57.7kg  Weight fluctuations noted, likely due to fluid shifts. No edema noted.     Pertinent Medications: MEDICATIONS  (STANDING):  aspirin enteric coated 81 milliGRAM(s) Oral daily  docusate sodium 100 milliGRAM(s) Oral three times a day  polyethylene glycol 3350 17 Gram(s) Oral daily  pantoprazole    Tablet 40 milliGRAM(s) Oral before breakfast  lactobacillus acidophilus and bulgaricus Chewable 1 Tablet(s) Chew three times a day  vancomycin  IVPB 1250 milliGRAM(s) IV Intermittent every 12 hours  vancomycin  IVPB      piperacillin/tazobactam IVPB. 3.375 Gram(s) IV Intermittent every 8 hours  sorbitol 70% Solution 30 milliLiter(s) Oral once  senna 2 Tablet(s) Oral at bedtime  oxyCODONE  ER Tablet 10 milliGRAM(s) Oral every 12 hours    MEDICATIONS  (PRN):  bisacodyl Suppository 10 milliGRAM(s) Rectal daily PRN Constipation  acetaminophen   Tablet. 650 milliGRAM(s) Oral every 6 hours PRN Mild Pain (1 - 3)    Pertinent Labs:    Hemoglobin: 8.7 g/dL (09.26.17 @ 05:40) - low  Hematocrit: 25.3 % (09.26.17 @ 05:40) - low  Basic Metabolic Panel (09.26.17 @ 05:40)    Sodium, Serum: 124: TEST REPEATED. mmol/L - low    Potassium, Serum: 4.5 mmol/L    Chloride, Serum: 88 mmol/L - low    Carbon Dioxide, Serum: 25 mmol/L    Anion Gap, Serum: 11 mmol/L    Blood Urea Nitrogen, Serum: 18 mg/dL    Creatinine, Serum: 0.65 mg/dL    Glucose, Serum: 93 mg/dL    Calcium, Total Serum: 9.0 mg/dL  Prealbumin, Serum: 10 mg/dL (09.25.17 @ 07:34) - low  Lactate Dehydrogenase, Serum: 424 U/L (09.26.17 @ 05:40) - high    Skin: No pressure ulcers noted.     Estimated Needs:   [ x ] no change since previous assessment  [ ] recalculated:       Previous Nutrition Diagnosis:   Increased Nutrient Needs - remains, being addressed with PO diet/supplements and encouragement with meals.   Food & Nutrition Related Knowledge Deficit - unable to assess at this time, pt made aware RD remains available for diet education as needed.   Malnutrition - remains, being addressed with PO diet/supplements and encouragement with meals.    New Nutrition Diagnosis: [ x ] not applicable    Interventions:   1. Encourage PO intake of meals and supplements.  2. Provide food preferences within therapeutic diet when requested.   3. Reviewed alternate menu options and menu ordering procedure.   4. Discussed importance of increased PO intake and protein rich foods postoperatively.   5. RD to follow up with diet education as needed.        Monitoring and Evaluation:     [ x ] PO intake [ x ] Tolerance to diet prescription [ x ] weights [ x ] follow up per protocol    [ ] other:

## 2017-09-26 NOTE — PROGRESS NOTE ADULT - PROBLEM SELECTOR PLAN 1
Continue lisinopril 7.5 mg QD Continue lisinopril decreased to 5mg d/t low MAPs  Po fluids encouraged

## 2017-09-26 NOTE — PROGRESS NOTE ADULT - ATTENDING COMMENTS
Still having pain.  Possible thoracentesis by IR, but I doubt the effusion is related to the pain.  Low MAPs; got 250 cc IVF today.  Will try to start BB this week.

## 2017-09-26 NOTE — PROGRESS NOTE ADULT - ASSESSMENT
Mr. Quispe is a 62 year old man with a nonischemic cardiomyopathy with ACC/AHA stage D congestive heart failure who presented with abdominal pain due to cardiogenic shock, now s/p Heart Mate II LVAD with TV annuloplasty ring on 9/12. His postoperative course was complicated by bleeding requiring transfusions, which has stabilitzed. He currently is euvolemic without evidence of right heart failure. His INR is slightly subtherapeutic and his MAPs are at goal. He reports discomfort on L side with examination , elevated sed rate 106 afebrile WBC declining from 19.8to 16.5 . pt appears euvolemic Mr. Quispe is a 62 year old man with a nonischemic cardiomyopathy with ACC/AHA stage D congestive heart failure who presented with abdominal pain due to cardiogenic shock, now s/p Heart Mate II LVAD with TV annuloplasty ring on 9/12. His postoperative course was complicated by bleeding requiring transfusions, which has stabilitzed. He currently is euvolemic without evidence of right heart failure. His INR is slightly subtherapeutic and his MAPs are at goal. He reports discomfort on L side with examination , elevated sed rate 106 afebrile WBC declining from 19.8to 16.5 . pt appears euvolemic  Map slightly low  encourage fluids

## 2017-09-26 NOTE — PROGRESS NOTE ADULT - ASSESSMENT
Reports ongoing pain "that is eating me."   Identified pain site on his left side.   Reports the pain is a 5-6 (staff aware). Does not feel pain medication helps.   Ambulated in antonio with walker and RN assistance without distress.  Sat in chair for several hours.  Poor sleep in setting of hospital environment.  Appetite poor.  Endorsed periods of low mood and frustration.   Reviewed stress management skills.  Reports his niece Celestina is coming tomorrow.       DX: Adjustment disorder with anxiety/depression     Recommendations:  May benefit from pain management consult   Will benefit from additional coping strategies to manage current stressors   Behavioral Cardiology will continue to follow

## 2017-09-26 NOTE — PROGRESS NOTE ADULT - ASSESSMENT
61 yo male with no PMHx presented with sob and abdominal pain  on 8/25/17.  Cta - for PE,  cardiac mRI and cath revealed   lv dysfunction and MR.  Cardiology was consulted, HF service consulted and the pt was placed in ccu with pulmonary edema. He had  low cardiac output and cardiogenic shock, and was  referred  for LVAD evaluation. ID was consulted to clear the patient for surgery.. GI was following for abd pain, no significant findings on virtual colonoscopy. He was treated for chf  with  afterload reduction  and milrinone and stabilized, euvolemic .  9/12/17 S/P Implantation of HeartMate II left ventricular assist device through median sternotomy, and repair of the tricuspid valve with a number 28 classic Christianson ring.  Post op he required inotropic support and PRBc's for acute blood loss anemia  He was transferred to sdu  9/18  He has c/o pain at the incision site and chest tube site  Chest tube to be d/c  9/20  Pain improved .  9/21 INR drifted to 1.8, Heparin started.  Lisinopril increased for BP and afterload  Leukocytosis, afebrile  Hyponatremia, off lasix  9/23 Leukocytosis WBC 19  CT chest/ab/plevis ordered  blood cx x2  ID consulted fluid bolus .9  per Dr Iyer low sodium, diet d/c regular diet ordered INR 2.08 heparin gtt d/c coumadin 5 tonight  9/24 cta done 2nd limited study prior.  Abdominal discomfort relieved with pain meds, d/w pain service, oxycontin recommended .  Ct scan with contrast reviewed by Dr Cadet.  Pt with loculated LLL effusion vs atelectasis, wbc improving but remains on antibiotics.  The coumadin was low dosed , awaiting IR tap of the loculated effusion and cultures to be sent.  Low map, ivf and decrease lisinopril

## 2017-09-26 NOTE — PROGRESS NOTE ADULT - SUBJECTIVE AND OBJECTIVE BOX
Patient is a 62y old  Male who presents with a chief complaint of SOOB (25 Aug 2017 22:27)    still with abdominal pain   no resp distress  no pulm symtpoms.        Any change in ROS:     MEDICATIONS  (STANDING):  aspirin enteric coated 81 milliGRAM(s) Oral daily  docusate sodium 100 milliGRAM(s) Oral three times a day  polyethylene glycol 3350 17 Gram(s) Oral daily  pantoprazole    Tablet 40 milliGRAM(s) Oral before breakfast  lisinopril 7.5 milliGRAM(s) Oral daily  lactobacillus acidophilus and bulgaricus Chewable 1 Tablet(s) Chew three times a day  vancomycin  IVPB 1250 milliGRAM(s) IV Intermittent every 12 hours  vancomycin  IVPB      piperacillin/tazobactam IVPB. 3.375 Gram(s) IV Intermittent every 8 hours  sorbitol 70% Solution 30 milliLiter(s) Oral once  senna 2 Tablet(s) Oral at bedtime  oxyCODONE  ER Tablet 10 milliGRAM(s) Oral every 12 hours    MEDICATIONS  (PRN):  bisacodyl Suppository 10 milliGRAM(s) Rectal daily PRN Constipation  acetaminophen   Tablet. 650 milliGRAM(s) Oral every 6 hours PRN Mild Pain (1 - 3)    Vital Signs Last 24 Hrs  T(C): 37 (26 Sep 2017 10:45), Max: 37 (25 Sep 2017 12:08)  T(F): 98.6 (26 Sep 2017 10:45), Max: 98.6 (25 Sep 2017 12:08)  HR: 94 (26 Sep 2017 10:45) (90 - 95)  BP: --  BP(mean): 62 (26 Sep 2017 10:45) (62 - 78)  RR: 16 (26 Sep 2017 10:45) (16 - 19)  SpO2: 95% (26 Sep 2017 10:45) (95% - 100%)    I&O's Summary    25 Sep 2017 07:01  -  26 Sep 2017 07:00  --------------------------------------------------------  IN: 710 mL / OUT: 1850 mL / NET: -1140 mL    26 Sep 2017 07:01  -  26 Sep 2017 11:06  --------------------------------------------------------  IN: 240 mL / OUT: 0 mL / NET: 240 mL          Physical Exam:   GENERAL: Cachectic  HEENT: ALONSO/   Atraumatic, Normocephalic  ENMT: No tonsillar erythema, exudates, or enlargement; Moist mucous membranes, Good dentition, No lesions  NECK: Supple, No JVD, Normal thyroid  CHEST/LUNG: Clear to auscultaion, ; No rales, rhonchi, wheezing, or rubs  CVS: Regular rate and rhythm; No murmurs, rubs, or gallops  GI: : Soft, Nontender, Nondistended; Bowel sounds present  NERVOUS SYSTEM:  Alert & Oriented X3  EXTREMITIES:  2+ Peripheral Pulses, No clubbing, cyanosis, or edema  LYMPH: No lymphadenopathy noted  SKIN: No rashes or lesions  ENDOCRINOLOGY: No Thyromegaly  PSYCH: Appropriate    Labs:                              8.7    16.5  )-----------( 456      ( 26 Sep 2017 05:40 )             25.3                         9.3    19.8  )-----------( 467      ( 25 Sep 2017 05:41 )             27.7                         9.4    16.4  )-----------( 416      ( 24 Sep 2017 02:26 )             27.5                         9.3    19.2  )-----------( 382      ( 23 Sep 2017 06:44 )             27.5     09-26    124<L>  |  88<L>  |  18  ----------------------------<  93  4.5   |  25  |  0.65  09-25    125<L>  |  88<L>  |  15  ----------------------------<  106<H>  4.7   |  26  |  0.56  09-24    124<L>  |  89<L>  |  13  ----------------------------<  110<H>  4.8   |  24  |  0.55  09-23    124<L>  |  88<L>  |  16  ----------------------------<  100<H>  5.2   |  26  |  0.52    Ca    9.0      26 Sep 2017 05:40  Ca    9.0      25 Sep 2017 05:41      CAPILLARY BLOOD GLUCOSE            PT/INR - ( 26 Sep 2017 05:40 )   PT: 23.8 sec;   INR: 2.17 ratio             Procalcitonin, Serum: 0.14 ng/mL (09-23 @ 18:03)  Lactate, Blood: 1.2 mmol/L (09-23 @ 14:38)  Cultures:     Vancomycin Trough:     09-25 @ 05:41  8.4    Gentamicin:          09-25 @ 05:41  --        < from: CT Angio Chest w/ IV Cont (09.24.17 @ 17:10) >  : Status post sternotomy. Degenerative spinal changes most prominent   at the level of L4 and L5 with disc height loss and mild retrolisthesis   of L4 on L5.    IMPRESSION:     Unchanged small left loculated pleural effusion with associated   compressive atelectasis of the left lower lobe.    Small amount of air within the bladder. Correlate for recent   instrumentation and with urinalysis to exclude infection.                  KEILA PORTER M.D., RADIOLOGY RESIDENT  This document has been electronically signed.  LISE GREGORY M.D., ATTENDING RADIOLOGIST    < end of copied text >                      Studies  Chest X-RAY  CT SCAN Chest   Venous Dopplers: LE:   CT Abdomen  Others

## 2017-09-27 LAB
ALBUMIN FLD-MCNC: 2.3 G/DL — SIGNIFICANT CHANGE UP
ANION GAP SERPL CALC-SCNC: 14 MMOL/L — SIGNIFICANT CHANGE UP (ref 5–17)
APTT BLD: 48.5 SEC — HIGH (ref 27.5–37.4)
APTT BLD: 60.6 SEC — HIGH (ref 27.5–37.4)
B PERT IGG+IGM PNL SER: ABNORMAL
BUN SERPL-MCNC: 18 MG/DL — SIGNIFICANT CHANGE UP (ref 7–23)
CALCIUM SERPL-MCNC: 8.9 MG/DL — SIGNIFICANT CHANGE UP (ref 8.4–10.5)
CHLORIDE SERPL-SCNC: 88 MMOL/L — LOW (ref 96–108)
CO2 SERPL-SCNC: 25 MMOL/L — SIGNIFICANT CHANGE UP (ref 22–31)
COLOR FLD: SIGNIFICANT CHANGE UP
CREAT SERPL-MCNC: 0.66 MG/DL — SIGNIFICANT CHANGE UP (ref 0.5–1.3)
EOSINOPHIL # FLD: 3 % — SIGNIFICANT CHANGE UP
FLUID INTAKE SUBSTANCE CLASS: SIGNIFICANT CHANGE UP
FLUID SEGMENTED GRANULOCYTES: 31 % — SIGNIFICANT CHANGE UP
GLUCOSE FLD-MCNC: 98 MG/DL — SIGNIFICANT CHANGE UP
GLUCOSE SERPL-MCNC: 195 MG/DL — HIGH (ref 70–99)
HCT VFR BLD CALC: 28.7 % — LOW (ref 39–50)
HGB BLD-MCNC: 9.6 G/DL — LOW (ref 13–17)
INR BLD: 1.79 RATIO — HIGH (ref 0.88–1.16)
LDH SERPL L TO P-CCNC: 1354 U/L — SIGNIFICANT CHANGE UP
LDH SERPL L TO P-CCNC: 391 U/L — HIGH (ref 50–242)
LYMPHOCYTES # FLD: 55 % — SIGNIFICANT CHANGE UP
MCHC RBC-ENTMCNC: 32.8 PG — SIGNIFICANT CHANGE UP (ref 27–34)
MCHC RBC-ENTMCNC: 33.6 GM/DL — SIGNIFICANT CHANGE UP (ref 32–36)
MCV RBC AUTO: 97.7 FL — SIGNIFICANT CHANGE UP (ref 80–100)
MESOTHL CELL # FLD: 1 % — SIGNIFICANT CHANGE UP
MONOS+MACROS # FLD: 10 % — SIGNIFICANT CHANGE UP
NRBC # FLD: 3 % — HIGH (ref 0–0)
PH FLD: 7.8 — SIGNIFICANT CHANGE UP
PLATELET # BLD AUTO: 476 K/UL — HIGH (ref 150–400)
POTASSIUM SERPL-MCNC: 4.5 MMOL/L — SIGNIFICANT CHANGE UP (ref 3.5–5.3)
POTASSIUM SERPL-SCNC: 4.5 MMOL/L — SIGNIFICANT CHANGE UP (ref 3.5–5.3)
PROT FLD-MCNC: 4.4 G/DL — SIGNIFICANT CHANGE UP
PROTHROM AB SERPL-ACNC: 19.8 SEC — HIGH (ref 9.8–12.7)
RBC # BLD: 2.94 M/UL — LOW (ref 4.2–5.8)
RBC # FLD: 13.4 % — SIGNIFICANT CHANGE UP (ref 10.3–14.5)
RCV VOL RI: HIGH /UL (ref 0–5)
SODIUM SERPL-SCNC: 127 MMOL/L — LOW (ref 135–145)
SPECIMEN SOURCE FLD: SIGNIFICANT CHANGE UP
TOTAL NUCLEATED CELL COUNT, BODY FLUID: 1090 /UL — HIGH (ref 0–5)
TUBE TYPE: SIGNIFICANT CHANGE UP
VANCOMYCIN TROUGH SERPL-MCNC: 14.2 UG/ML — SIGNIFICANT CHANGE UP (ref 10–20)
WBC # BLD: 16.3 K/UL — HIGH (ref 3.8–10.5)
WBC # FLD AUTO: 16.3 K/UL — HIGH (ref 3.8–10.5)

## 2017-09-27 PROCEDURE — 99232 SBSQ HOSP IP/OBS MODERATE 35: CPT

## 2017-09-27 PROCEDURE — 99233 SBSQ HOSP IP/OBS HIGH 50: CPT

## 2017-09-27 PROCEDURE — 32557 INSERT CATH PLEURA W/ IMAGE: CPT | Mod: LT

## 2017-09-27 RX ORDER — WARFARIN SODIUM 2.5 MG/1
5 TABLET ORAL ONCE
Qty: 0 | Refills: 0 | Status: COMPLETED | OUTPATIENT
Start: 2017-09-27 | End: 2017-09-27

## 2017-09-27 RX ADMIN — OXYCODONE HYDROCHLORIDE 10 MILLIGRAM(S): 5 TABLET ORAL at 06:12

## 2017-09-27 RX ADMIN — HEPARIN SODIUM 12 UNIT(S)/HR: 5000 INJECTION INTRAVENOUS; SUBCUTANEOUS at 13:32

## 2017-09-27 RX ADMIN — Medication 100 MILLIGRAM(S): at 13:34

## 2017-09-27 RX ADMIN — OXYCODONE HYDROCHLORIDE 10 MILLIGRAM(S): 5 TABLET ORAL at 05:42

## 2017-09-27 RX ADMIN — OXYCODONE HYDROCHLORIDE 10 MILLIGRAM(S): 5 TABLET ORAL at 19:00

## 2017-09-27 RX ADMIN — WARFARIN SODIUM 5 MILLIGRAM(S): 2.5 TABLET ORAL at 21:15

## 2017-09-27 RX ADMIN — Medication 650 MILLIGRAM(S): at 04:47

## 2017-09-27 RX ADMIN — HEPARIN SODIUM 12 UNIT(S)/HR: 5000 INJECTION INTRAVENOUS; SUBCUTANEOUS at 18:59

## 2017-09-27 RX ADMIN — SENNA PLUS 2 TABLET(S): 8.6 TABLET ORAL at 21:15

## 2017-09-27 RX ADMIN — Medication 81 MILLIGRAM(S): at 11:16

## 2017-09-27 RX ADMIN — LISINOPRIL 5 MILLIGRAM(S): 2.5 TABLET ORAL at 05:43

## 2017-09-27 RX ADMIN — PIPERACILLIN AND TAZOBACTAM 25 GRAM(S): 4; .5 INJECTION, POWDER, LYOPHILIZED, FOR SOLUTION INTRAVENOUS at 13:34

## 2017-09-27 RX ADMIN — PIPERACILLIN AND TAZOBACTAM 25 GRAM(S): 4; .5 INJECTION, POWDER, LYOPHILIZED, FOR SOLUTION INTRAVENOUS at 05:50

## 2017-09-27 RX ADMIN — Medication 166.67 MILLIGRAM(S): at 18:22

## 2017-09-27 RX ADMIN — Medication 100 MILLIGRAM(S): at 21:15

## 2017-09-27 RX ADMIN — Medication 166.67 MILLIGRAM(S): at 05:50

## 2017-09-27 RX ADMIN — Medication 650 MILLIGRAM(S): at 04:17

## 2017-09-27 RX ADMIN — LACTOBACILLUS ACIDOPH-L.BULGARICUS 1 MILLION CELL CHEWABLE TABLET 1 TABLET(S): at 21:15

## 2017-09-27 RX ADMIN — HEPARIN SODIUM 12 UNIT(S)/HR: 5000 INJECTION INTRAVENOUS; SUBCUTANEOUS at 05:10

## 2017-09-27 RX ADMIN — PIPERACILLIN AND TAZOBACTAM 25 GRAM(S): 4; .5 INJECTION, POWDER, LYOPHILIZED, FOR SOLUTION INTRAVENOUS at 21:18

## 2017-09-27 RX ADMIN — OXYCODONE HYDROCHLORIDE 10 MILLIGRAM(S): 5 TABLET ORAL at 18:22

## 2017-09-27 NOTE — PROGRESS NOTE ADULT - SUBJECTIVE AND OBJECTIVE BOX
Patient is a 62y old  Male who presents with a chief complaint of SOOB (25 Aug 2017 22:27)    pt is doing ok , for tap today  still with some abdominal pain  Any change in ROS:     MEDICATIONS  (STANDING):  aspirin enteric coated 81 milliGRAM(s) Oral daily  docusate sodium 100 milliGRAM(s) Oral three times a day  polyethylene glycol 3350 17 Gram(s) Oral daily  pantoprazole    Tablet 40 milliGRAM(s) Oral before breakfast  lactobacillus acidophilus and bulgaricus Chewable 1 Tablet(s) Chew three times a day  vancomycin  IVPB 1250 milliGRAM(s) IV Intermittent every 12 hours  vancomycin  IVPB      piperacillin/tazobactam IVPB. 3.375 Gram(s) IV Intermittent every 8 hours  sorbitol 70% Solution 30 milliLiter(s) Oral once  senna 2 Tablet(s) Oral at bedtime  oxyCODONE  ER Tablet 10 milliGRAM(s) Oral every 12 hours  lisinopril 5 milliGRAM(s) Oral daily  heparin  Infusion 1100 Unit(s)/Hr (12 mL/Hr) IV Continuous <Continuous>    MEDICATIONS  (PRN):  bisacodyl Suppository 10 milliGRAM(s) Rectal daily PRN Constipation  acetaminophen   Tablet. 650 milliGRAM(s) Oral every 6 hours PRN Mild Pain (1 - 3)    Vital Signs Last 24 Hrs  T(C): 37 (27 Sep 2017 12:00), Max: 37 (27 Sep 2017 12:00)  T(F): 98.6 (27 Sep 2017 12:00), Max: 98.6 (27 Sep 2017 12:00)  HR: 96 (27 Sep 2017 12:00) (85 - 97)  BP: --  BP(mean): 66 (27 Sep 2017 12:00) (62 - 74)  RR: 16 (27 Sep 2017 12:00) (16 - 16)  SpO2: 99% (27 Sep 2017 08:00) (97% - 100%)    I&O's Summary    26 Sep 2017 07:01  -  27 Sep 2017 07:00  --------------------------------------------------------  IN: 954 mL / OUT: 1450 mL / NET: -496 mL    27 Sep 2017 07:01  -  27 Sep 2017 12:59  --------------------------------------------------------  IN: 0 mL / OUT: 350 mL / NET: -350 mL          Physical Exam:   GENERAL: cachectic  HEENT: ALONSO/   Atraumatic, Normocephalic  ENMT: No tonsillar erythema, exudates, or enlargement; Moist mucous membranes, Good dentition, No lesions  NECK: Supple, No JVD, Normal thyroid  CHEST/LUNG: decreased air entry left base   CVS: Regular rate and rhythm; No murmurs, rubs, or gallops  GI: : Soft, Nontender, Nondistended; Bowel sounds present  NERVOUS SYSTEM:  Alert & Oriented X3  EXTREMITIES:  2+ Peripheral Pulses, No clubbing, cyanosis, or edema  LYMPH: No lymphadenopathy noted  SKIN: No rashes or lesions  ENDOCRINOLOGY: No Thyromegaly  PSYCH: Appropriate    Labs:                         9.6    16.3  )-----------( 476      ( 27 Sep 2017 04:06 )             28.7                         8.7    16.5  )-----------( 456      ( 26 Sep 2017 05:40 )             25.3                         9.3    19.8  )-----------( 467      ( 25 Sep 2017 05:41 )             27.7                         9.4    16.4  )-----------( 416      ( 24 Sep 2017 02:26 )             27.5     09-27    127<L>  |  88<L>  |  18  ----------------------------<  195<H>  4.5   |  25  |  0.66  09-26    124<L>  |  88<L>  |  18  ----------------------------<  93  4.5   |  25  |  0.65  09-25    125<L>  |  88<L>  |  15  ----------------------------<  106<H>  4.7   |  26  |  0.56  09-24    124<L>  |  89<L>  |  13  ----------------------------<  110<H>  4.8   |  24  |  0.55    Ca    8.9      27 Sep 2017 04:06  Ca    9.0      26 Sep 2017 05:40      CAPILLARY BLOOD GLUCOSE            PT/INR - ( 27 Sep 2017 04:06 )   PT: 19.8 sec;   INR: 1.79 ratio         PTT - ( 27 Sep 2017 11:39 )  PTT:60.6 sec    Procalcitonin, Serum: 0.14 ng/mL (09-23 @ 18:03)  Lactate, Blood: 1.2 mmol/L (09-23 @ 14:38)  Cultures:     Vancomycin Trough:     09-27 @ 04:06  14.2    Gentamicin:          09-27 @ 04:06  --    Vancomycin Trough:     09-25 @ 05:41  8.4    Gentamicin:          09-25 @ 05:41  --          < from: CT Angio Abdomen and Pelvis w/ IV Cont (09.24.17 @ 17:11) >  the level of L4 and L5 with disc height loss and mild retrolisthesis   of L4 on L5.    IMPRESSION:     Unchanged small left loculated pleural effusion with associated   compressive atelectasis of the left lower lobe.    Small amount of air within the bladder. Correlate for recent   instrumentation and with urinalysis to exclude infection.                  KEILA PORTER M.D., RADIOLOGY RESIDENT  This document has been electronically signed.  LISE GREGORY M.D., ATTENDING RADIOLOGIST  This document has beenelectronically signed. Sep 25 2017 12:56PM    < end of copied text >                    Studies  Chest X-RAY  CT SCAN Chest   Venous Dopplers: LE:   CT Abdomen  Others

## 2017-09-27 NOTE — PROGRESS NOTE ADULT - SUBJECTIVE AND OBJECTIVE BOX
INFECTIOUS DISEASES FOLLOW UP-- Anitra Prater  [phone]    This is a follow up note for this  62yMale with s/p LVAD who is undergoing a left thoracentesis for leukocytosis with a loculated pleural effusion.      ROS:  CONSTITUTIONAL:  No fever, good appetite  CARDIOVASCULAR:  No chest pain or palpitations  RESPIRATORY:  No dyspnea  GASTROINTESTINAL:  No nausea, vomiting, diarrhea, or abdominal pain  GENITOURINARY:  No dysuria  NEUROLOGIC:  No headache,     Allergies    No Known Allergies    Intolerances        ANTIBIOTICS/RELEVANT:  antimicrobials  vancomycin  IVPB 1250 milliGRAM(s) IV Intermittent every 12 hours  vancomycin  IVPB      piperacillin/tazobactam IVPB. 3.375 Gram(s) IV Intermittent every 8 hours    immunologic:    OTHER:  aspirin enteric coated 81 milliGRAM(s) Oral daily  docusate sodium 100 milliGRAM(s) Oral three times a day  polyethylene glycol 3350 17 Gram(s) Oral daily  pantoprazole    Tablet 40 milliGRAM(s) Oral before breakfast  bisacodyl Suppository 10 milliGRAM(s) Rectal daily PRN  lactobacillus acidophilus and bulgaricus Chewable 1 Tablet(s) Chew three times a day  sorbitol 70% Solution 30 milliLiter(s) Oral once  senna 2 Tablet(s) Oral at bedtime  oxyCODONE  ER Tablet 10 milliGRAM(s) Oral every 12 hours  acetaminophen   Tablet. 650 milliGRAM(s) Oral every 6 hours PRN  lisinopril 5 milliGRAM(s) Oral daily  heparin  Infusion 1100 Unit(s)/Hr IV Continuous <Continuous>  warfarin 5 milliGRAM(s) Oral once      Objective:  Vital Signs Last 24 Hrs  T(C): 36.9 (27 Sep 2017 18:00), Max: 37 (27 Sep 2017 12:00)  T(F): 98.5 (27 Sep 2017 18:00), Max: 98.6 (27 Sep 2017 12:00)  HR: 93 (27 Sep 2017 18:00) (85 - 96)  BP: --  BP(mean): 78 (27 Sep 2017 18:00) (66 - 78)  RR: 18 (27 Sep 2017 18:00) (16 - 18)  SpO2: 99% (27 Sep 2017 08:00) (97% - 100%)    PHYSICAL EXAM:  Constitutional:no acute distress  Eyes:ALONSO, EOMI  Ear/Nose/Throat: no oral lesions, 	  Respiratory: clear BL  Cardiovascular: S1S2  Gastrointestinal:soft, (+) BS, no tenderness  Extremities:no e/e/c  No Lymphadenopathy  IV sites not inflammed.    LABS:                        9.6    16.3  )-----------( 476      ( 27 Sep 2017 04:06 )             28.7     09-27    127<L>  |  88<L>  |  18  ----------------------------<  195<H>  4.5   |  25  |  0.66    Ca    8.9      27 Sep 2017 04:06      PT/INR - ( 27 Sep 2017 04:06 )   PT: 19.8 sec;   INR: 1.79 ratio         PTT - ( 27 Sep 2017 11:39 )  PTT:60.6 sec      MICROBIOLOGY:  Culture - Blood (09.23.17 @ 09:54)    Specimen Source: .Blood Blood-Peripheral    Culture Results:   No growth to date.    Culture - Blood (09.23.17 @ 09:54)    Specimen Source: .Blood Blood-Peripheral    Culture Results:   No growth to date.              RECENT CULTURES:      RADIOLOGY & ADDITIONAL STUDIES:

## 2017-09-27 NOTE — PROGRESS NOTE ADULT - SUBJECTIVE AND OBJECTIVE BOX
Interventional Radiology Procedure Note    Procedure: Left chest tube and thoracentesis    Indication:  Loculated left pleural effusion    Operators: Champ    Anesthesia (type): lidocaine 1 %    Contrast:  None    EBL: 10cc    Findings/Follow up Plan of Care:  Left chest tube placed using ultrasound and fluoroscopic guidance.  Approximately  330 cc serosanguinous fluid obtained with specimen sent for culture and chemistry.  Post- images - decreased effusion with tube in place and no pneumothorax.    Specimens Removed: Culture and chemistry    Implants: 10 Irish drain    Complications:  None     Condition/Disposition:  Return to floors.    Please call Interventional Radiology x [phone] with any questions, concerns, or issues ([phone] after hours).

## 2017-09-27 NOTE — PROGRESS NOTE ADULT - ASSESSMENT
63 yo male with no PMHx presented with sob and abdominal pain  on 8/25/17.  Cta - for PE,  cardiac mRI and cath revealed   lv dysfunction and MR.  Cardiology was consulted, HF service consulted and the pt was placed in ccu with pulmonary edema. He had  low cardiac output and cardiogenic shock, and was  referred  for LVAD evaluation. ID was consulted to clear the patient for surgery.. GI was following for abd pain, no significant findings on virtual colonoscopy. He was treated for chf  with  afterload reduction  and milrinone and stabilized, euvolemic .  9/12/17 S/P Implantation of HeartMate II left ventricular assist device through median sternotomy, and repair of the tricuspid valve with a number 28 classic Christianson ring.  Post op he required inotropic support and PRBc's for acute blood loss anemia  He was transferred to sdu  9/18  He has c/o pain at the incision site and chest tube site  Chest tube to be d/c  9/20  Pain improved .  9/21 INR drifted to 1.8, Heparin started.  Lisinopril increased for BP and afterload  Leukocytosis, afebrile  Hyponatremia, off lasix  9/23 Leukocytosis WBC 19  CT chest/ab/plevis ordered  blood cx x2  ID consulted fluid bolus .9  per Dr Iyer low sodium, diet d/c regular diet ordered INR 2.08 heparin gtt d/c coumadin 5 tonight  9/24 cta done 2nd limited study prior.  Abdominal discomfort relieved with pain meds, d/w pain service, oxycontin recommended .  Ct scan with contrast reviewed by Dr Cadet.  Pt with loculated LLL effusion vs atelectasis, wbc improving but remains on antibiotics.  The coumadin was low dosed , awaiting IR tap of the loculated effusion and cultures to be sent.  Low map, ivf and decrease lisinopril  The IR procedure was cancelled by IR yesterday and he is still awaiting the thoracentisis.  The INR was below 2.0, heparin started.

## 2017-09-27 NOTE — PROGRESS NOTE ADULT - ASSESSMENT
61 yo man s/p LVAd placement 9/12/17 with leukocytosis and left sided pain. Unclear if his pain and leukocytosis are related to the left sided effusion. Given the persistence is both would favor a diagnostic thoracentesis with IR guidance to r/o infection source  Continue the Vancomycin and Zosyn pending cultures results    Pleural fluid to be sent for gram stain, culture, fungal stain, AFB stain/cx.,

## 2017-09-27 NOTE — PROGRESS NOTE ADULT - ASSESSMENT
Mr. Quispe is a 62 year old man with a nonischemic cardiomyopathy with ACC/AHA stage D congestive heart failure who presented with abdominal pain due to cardiogenic shock, now s/p Heart Mate II LVAD with TV annuloplasty ring on 9/12. His postoperative course was complicated by bleeding requiring transfusions, which has stabilitzed. He currently is euvolemic without evidence of right heart failure. His INR is slightly subtherapeutic and his MAPs are at goal. He reports discomfort on L side with examination , elevated sed rate 106 afebrile WBC declining from 19.8to 16.5 . pt appears euvolemic  MAP 70 today no PI events , no events overnight , pt continues to have chronic L sided pain in varying areas no pain on inspiration + pain to palpation with some relief with medications

## 2017-09-27 NOTE — PROGRESS NOTE ADULT - SUBJECTIVE AND OBJECTIVE BOX
Subjective: Pt sitting in chair no events overnight   pt prescribes to L  sided pain no change for many days   Awaiting IR     Medications:  aspirin enteric coated 81 milliGRAM(s) Oral daily  docusate sodium 100 milliGRAM(s) Oral three times a day  polyethylene glycol 3350 17 Gram(s) Oral daily  pantoprazole    Tablet 40 milliGRAM(s) Oral before breakfast  bisacodyl Suppository 10 milliGRAM(s) Rectal daily PRN  lactobacillus acidophilus and bulgaricus Chewable 1 Tablet(s) Chew three times a day  vancomycin  IVPB 1250 milliGRAM(s) IV Intermittent every 12 hours  piperacillin/tazobactam IVPB. 3.375 Gram(s) IV Intermittent every 8 hours  sorbitol 70% Solution 30 milliLiter(s) Oral once  senna 2 Tablet(s) Oral at bedtime  oxyCODONE  ER Tablet 10 milliGRAM(s) Oral every 12 hours  acetaminophen   Tablet. 650 milliGRAM(s) Oral every 6 hours PRN  lisinopril 5 milliGRAM(s) Oral daily  heparin  Infusion 1100 Unit(s)/Hr IV Continuous <Continuous>      PHYSICAL EXAM:  Vital Signs Last 24 Hrs  T(C): 36.5 (27 Sep 2017 08:00), Max: 36.8 (26 Sep 2017 20:00)  T(F): 97.7 (27 Sep 2017 08:00), Max: 98.3 (26 Sep 2017 20:00)  HR: 85 (27 Sep 2017 08:00) (85 - 97)  BP(mean): 74 (27 Sep 2017 08:00) (62 - 74)  RR: 16 (27 Sep 2017 08:00) (16 - 16)  SpO2: 199% (27 Sep 2017 08:00) (97% - 199%)  Daily Weight in k.7 (26 Sep 2017 10:56)    26 Sep 2017 07:01  -  27 Sep 2017 07:00  --------------------------------------------------------  IN:    heparin Infusion: 44 mL    Oral Fluid: 560 mL    Solution: 350 mL  Total IN: 954 mL    OUT:    Voided: 1450 mL  Total OUT: 1450 mL    Total NET: -496 mL      General: A/ox 3, No acute Distress  Neck: Supple, normal JVD  Cardiac: S1 S2, LVAD sounds   Pulmonary: CTAB, Breathing unlabored, decreased  L base   Abdomen: Soft, Non -tender, +BS   Extremities: No Rashes, No edema  Neuro: A/o x 3, No focal deficits  Psch: normal mood , normal affect  LVAD Interrogation:  Pump Flow:5.8  Pump Speed:9000  Pulse Index:5.3  Pump Power:5.6   VAD Events: no events   Driveline evaluation:  clean and dry   Programming Changes: [ ] No changes made                         9.6    16.3  )-----------( 476      ( 27 Sep 2017 04:06 )             28.7     09-27    127<L>  |  88<L>  |  18  ----------------------------<  195<H>  4.5   |  25  |  0.66    Ca    8.9      27 Sep 2017 04:06      PT/INR - ( 27 Sep 2017 04:06 )   PT: 19.8 sec;   INR: 1.79 ratio         PTT - ( 27 Sep 2017 04:06 )  PTT:48.5 sec    ( 424)

## 2017-09-27 NOTE — PROGRESS NOTE ADULT - SUBJECTIVE AND OBJECTIVE BOX
im in pain    VITAL SIGNS    Telemetry:  NSR 60    Vital Signs Last 24 Hrs  T(C): 37 (09-27-17 @ 12:00), Max: 37 (09-27-17 @ 12:00)  T(F): 98.6 (09-27-17 @ 12:00), Max: 98.6 (09-27-17 @ 12:00)  HR: 96 (09-27-17 @ 12:00) (85 - 97)  BP: -- 66-74  RR: 16 (09-27-17 @ 12:00) (16 - 16)  SpO2: 99% (09-27-17 @ 08:00) (97% - 100%)                   09-26 @ 07:01  -  09-27 @ 07:00  --------------------------------------------------------  IN: 954 mL / OUT: 1450 mL / NET: -496 mL    09-27 @ 07:01  -  09-27 @ 12:14  --------------------------------------------------------  IN: 0 mL / OUT: 350 mL / NET: -350 mL          Daily     Daily         CAPILLARY BLOOD GLUCOSE                      Coumadin    [x ] YES          [  ]      NO         Reason:                               PHYSICAL EXAM        Neurology: alert and oriented x 3, nonfocal, no gross deficits  CV : + hum  Sternal Wound :  CDI , Stable    Lungs: cta    Abdomen: soft, nontender, nondistended, positive bowel sounds, last bowel movement   :       voiding     Extremities:         -edema, negative calve tenderness,              aspirin enteric coated 81 milliGRAM(s) Oral daily  docusate sodium 100 milliGRAM(s) Oral three times a day  polyethylene glycol 3350 17 Gram(s) Oral daily  pantoprazole    Tablet 40 milliGRAM(s) Oral before breakfast  bisacodyl Suppository 10 milliGRAM(s) Rectal daily PRN  lactobacillus acidophilus and bulgaricus Chewable 1 Tablet(s) Chew three times a day  vancomycin  IVPB 1250 milliGRAM(s) IV Intermittent every 12 hours  vancomycin  IVPB      piperacillin/tazobactam IVPB. 3.375 Gram(s) IV Intermittent every 8 hours  sorbitol 70% Solution 30 milliLiter(s) Oral once  senna 2 Tablet(s) Oral at bedtime  oxyCODONE  ER Tablet 10 milliGRAM(s) Oral every 12 hours  acetaminophen   Tablet. 650 milliGRAM(s) Oral every 6 hours PRN  lisinopril 5 milliGRAM(s) Oral daily  heparin  Infusion 1100 Unit(s)/Hr IV Continuous <Continuous>                    Physical Therapy Rec:   Home  [  ]   Home w/ PT  [  ]  Rehab  [  ]  Discussed with Cardiothoracic Team at AM rounds.

## 2017-09-27 NOTE — PROGRESS NOTE ADULT - ATTENDING COMMENTS
Scheduled for thoracentesis today.  WBC, LDH stable.  On heparin gtt.  May decrease lisinopril further if MAPs remain 60-70.

## 2017-09-27 NOTE — PROGRESS NOTE ADULT - ATTENDING COMMENTS
As above, with perform thoracentesis and possible chest tube insertion.  Consent obtained and reinforced.

## 2017-09-27 NOTE — PROGRESS NOTE ADULT - PROBLEM SELECTOR PLAN 1
stable: His CT scan is reviewed: small loculated effusion: doubt any infection, for possible diagnostic tap today, to rule out any infection: He had small pleural effusion on the left side on 8/25 ct scan chest : This seems to be simple loculated effusion ( secondary to chronicity): The etiology of abdominal pain Is not clear: , though it is chronic!!  8/27/2017: for tap today

## 2017-09-27 NOTE — PROGRESS NOTE ADULT - SUBJECTIVE AND OBJECTIVE BOX
Interventional Radiology     61 y/o Male with no significant PMH found to have pulmonary edema, low cardiac output, and cardiogenic shock s/p implantation of Left ventricular assist device now with left pleural effusion referred to IR for Left thoracentesis and possible chest tube placement.        Allergies  No Known Allergies    Labs:                        9.6    16.3  )-----------( 476      ( 27 Sep 2017 04:06 )             28.7     09-27    127<L>  |  88<L>  |  18  ----------------------------<  195<H>  4.5   |  25  |  0.66    Ca    8.9      27 Sep 2017 04:06      PT/INR - ( 27 Sep 2017 04:06 )   PT: 19.8 sec;   INR: 1.79 ratio    PTT - ( 27 Sep 2017 11:39 )  PTT:60.6 sec    Heparin drip held today at 1430    A/P  61 y/o Male with medical history as above with left pleural effusion     After risks, benefits, alternatives discussion pt verbalized understanding and signed informed consent.  Will plan for Left thoracentesis and possible Left pigtail chest tube placement.    ROBERTA Laboy  [facility] [phone]  Ext [phone]

## 2017-09-28 DIAGNOSIS — J90 PLEURAL EFFUSION, NOT ELSEWHERE CLASSIFIED: ICD-10-CM

## 2017-09-28 LAB
ALBUMIN SERPL ELPH-MCNC: 2.9 G/DL — LOW (ref 3.3–5)
ALP SERPL-CCNC: 122 U/L — HIGH (ref 40–120)
ALT FLD-CCNC: 33 U/L RC — SIGNIFICANT CHANGE UP (ref 10–45)
ANION GAP SERPL CALC-SCNC: 12 MMOL/L — SIGNIFICANT CHANGE UP (ref 5–17)
APTT BLD: 59.4 SEC — HIGH (ref 27.5–37.4)
APTT BLD: 64.7 SEC — HIGH (ref 27.5–37.4)
APTT BLD: 87.3 SEC — HIGH (ref 27.5–37.4)
AST SERPL-CCNC: 27 U/L — SIGNIFICANT CHANGE UP (ref 10–40)
BASOPHILS # BLD AUTO: 0.1 K/UL — SIGNIFICANT CHANGE UP (ref 0–0.2)
BASOPHILS NFR BLD AUTO: 0.4 % — SIGNIFICANT CHANGE UP (ref 0–2)
BILIRUB SERPL-MCNC: 0.4 MG/DL — SIGNIFICANT CHANGE UP (ref 0.2–1.2)
BUN SERPL-MCNC: 16 MG/DL — SIGNIFICANT CHANGE UP (ref 7–23)
CALCIUM SERPL-MCNC: 8.7 MG/DL — SIGNIFICANT CHANGE UP (ref 8.4–10.5)
CHLORIDE SERPL-SCNC: 88 MMOL/L — LOW (ref 96–108)
CO2 SERPL-SCNC: 24 MMOL/L — SIGNIFICANT CHANGE UP (ref 22–31)
CREAT SERPL-MCNC: 0.56 MG/DL — SIGNIFICANT CHANGE UP (ref 0.5–1.3)
CULTURE RESULTS: SIGNIFICANT CHANGE UP
CULTURE RESULTS: SIGNIFICANT CHANGE UP
EOSINOPHIL # BLD AUTO: 0.6 K/UL — HIGH (ref 0–0.5)
EOSINOPHIL NFR BLD AUTO: 4.2 % — SIGNIFICANT CHANGE UP (ref 0–6)
GLUCOSE SERPL-MCNC: 116 MG/DL — HIGH (ref 70–99)
GRAM STN FLD: SIGNIFICANT CHANGE UP
HCT VFR BLD CALC: 26 % — LOW (ref 39–50)
HGB BLD-MCNC: 8.8 G/DL — LOW (ref 13–17)
INR BLD: 1.57 RATIO — HIGH (ref 0.88–1.16)
LDH SERPL L TO P-CCNC: 369 U/L — HIGH (ref 50–242)
LYMPHOCYTES # BLD AUTO: 1.4 K/UL — SIGNIFICANT CHANGE UP (ref 1–3.3)
LYMPHOCYTES # BLD AUTO: 10.7 % — LOW (ref 13–44)
MCHC RBC-ENTMCNC: 33 PG — SIGNIFICANT CHANGE UP (ref 27–34)
MCHC RBC-ENTMCNC: 34 GM/DL — SIGNIFICANT CHANGE UP (ref 32–36)
MCV RBC AUTO: 97.1 FL — SIGNIFICANT CHANGE UP (ref 80–100)
MONOCYTES # BLD AUTO: 1.2 K/UL — HIGH (ref 0–0.9)
MONOCYTES NFR BLD AUTO: 9.1 % — SIGNIFICANT CHANGE UP (ref 2–14)
NEUTROPHILS # BLD AUTO: 10 K/UL — HIGH (ref 1.8–7.4)
NEUTROPHILS NFR BLD AUTO: 75.5 % — SIGNIFICANT CHANGE UP (ref 43–77)
NIGHT BLUE STAIN TISS: SIGNIFICANT CHANGE UP
PLATELET # BLD AUTO: 467 K/UL — HIGH (ref 150–400)
POTASSIUM SERPL-MCNC: 4.5 MMOL/L — SIGNIFICANT CHANGE UP (ref 3.5–5.3)
POTASSIUM SERPL-SCNC: 4.5 MMOL/L — SIGNIFICANT CHANGE UP (ref 3.5–5.3)
PROT SERPL-MCNC: 6.9 G/DL — SIGNIFICANT CHANGE UP (ref 6–8.3)
PROTHROM AB SERPL-ACNC: 17.3 SEC — HIGH (ref 9.8–12.7)
RBC # BLD: 2.68 M/UL — LOW (ref 4.2–5.8)
RBC # FLD: 13.1 % — SIGNIFICANT CHANGE UP (ref 10.3–14.5)
SODIUM SERPL-SCNC: 124 MMOL/L — LOW (ref 135–145)
SPECIMEN SOURCE: SIGNIFICANT CHANGE UP
WBC # BLD: 13.3 K/UL — HIGH (ref 3.8–10.5)
WBC # FLD AUTO: 13.3 K/UL — HIGH (ref 3.8–10.5)

## 2017-09-28 PROCEDURE — 99233 SBSQ HOSP IP/OBS HIGH 50: CPT | Mod: 25

## 2017-09-28 PROCEDURE — 93750 INTERROGATION VAD IN PERSON: CPT

## 2017-09-28 PROCEDURE — 99232 SBSQ HOSP IP/OBS MODERATE 35: CPT

## 2017-09-28 PROCEDURE — 99231 SBSQ HOSP IP/OBS SF/LOW 25: CPT

## 2017-09-28 RX ORDER — ACETAMINOPHEN 500 MG
1000 TABLET ORAL ONCE
Qty: 0 | Refills: 0 | Status: COMPLETED | OUTPATIENT
Start: 2017-09-28 | End: 2017-09-28

## 2017-09-28 RX ORDER — HEPARIN SODIUM 5000 [USP'U]/ML
4500 INJECTION INTRAVENOUS; SUBCUTANEOUS EVERY 6 HOURS
Qty: 0 | Refills: 0 | Status: DISCONTINUED | OUTPATIENT
Start: 2017-09-28 | End: 2017-10-01

## 2017-09-28 RX ORDER — HEPARIN SODIUM 5000 [USP'U]/ML
2000 INJECTION INTRAVENOUS; SUBCUTANEOUS EVERY 6 HOURS
Qty: 0 | Refills: 0 | Status: DISCONTINUED | OUTPATIENT
Start: 2017-09-28 | End: 2017-10-01

## 2017-09-28 RX ORDER — ACETAMINOPHEN 500 MG
975 TABLET ORAL EVERY 8 HOURS
Qty: 0 | Refills: 0 | Status: DISCONTINUED | OUTPATIENT
Start: 2017-09-28 | End: 2017-10-01

## 2017-09-28 RX ORDER — HEPARIN SODIUM 5000 [USP'U]/ML
1200 INJECTION INTRAVENOUS; SUBCUTANEOUS
Qty: 25000 | Refills: 0 | Status: DISCONTINUED | OUTPATIENT
Start: 2017-09-28 | End: 2017-10-01

## 2017-09-28 RX ORDER — WARFARIN SODIUM 2.5 MG/1
6 TABLET ORAL ONCE
Qty: 0 | Refills: 0 | Status: COMPLETED | OUTPATIENT
Start: 2017-09-28 | End: 2017-09-28

## 2017-09-28 RX ADMIN — LACTOBACILLUS ACIDOPH-L.BULGARICUS 1 MILLION CELL CHEWABLE TABLET 1 TABLET(S): at 14:27

## 2017-09-28 RX ADMIN — LACTOBACILLUS ACIDOPH-L.BULGARICUS 1 MILLION CELL CHEWABLE TABLET 1 TABLET(S): at 05:13

## 2017-09-28 RX ADMIN — Medication 975 MILLIGRAM(S): at 15:00

## 2017-09-28 RX ADMIN — PIPERACILLIN AND TAZOBACTAM 25 GRAM(S): 4; .5 INJECTION, POWDER, LYOPHILIZED, FOR SOLUTION INTRAVENOUS at 22:24

## 2017-09-28 RX ADMIN — Medication 100 MILLIGRAM(S): at 22:21

## 2017-09-28 RX ADMIN — PIPERACILLIN AND TAZOBACTAM 25 GRAM(S): 4; .5 INJECTION, POWDER, LYOPHILIZED, FOR SOLUTION INTRAVENOUS at 05:12

## 2017-09-28 RX ADMIN — Medication 975 MILLIGRAM(S): at 22:20

## 2017-09-28 RX ADMIN — Medication 975 MILLIGRAM(S): at 22:45

## 2017-09-28 RX ADMIN — OXYCODONE HYDROCHLORIDE 10 MILLIGRAM(S): 5 TABLET ORAL at 05:30

## 2017-09-28 RX ADMIN — OXYCODONE HYDROCHLORIDE 10 MILLIGRAM(S): 5 TABLET ORAL at 05:02

## 2017-09-28 RX ADMIN — HEPARIN SODIUM 12.5 UNIT(S)/HR: 5000 INJECTION INTRAVENOUS; SUBCUTANEOUS at 02:21

## 2017-09-28 RX ADMIN — Medication 100 MILLIGRAM(S): at 14:27

## 2017-09-28 RX ADMIN — Medication 100 MILLIGRAM(S): at 05:13

## 2017-09-28 RX ADMIN — Medication 650 MILLIGRAM(S): at 08:54

## 2017-09-28 RX ADMIN — LISINOPRIL 5 MILLIGRAM(S): 2.5 TABLET ORAL at 05:13

## 2017-09-28 RX ADMIN — Medication 650 MILLIGRAM(S): at 08:24

## 2017-09-28 RX ADMIN — PIPERACILLIN AND TAZOBACTAM 25 GRAM(S): 4; .5 INJECTION, POWDER, LYOPHILIZED, FOR SOLUTION INTRAVENOUS at 14:25

## 2017-09-28 RX ADMIN — SENNA PLUS 2 TABLET(S): 8.6 TABLET ORAL at 22:21

## 2017-09-28 RX ADMIN — Medication 81 MILLIGRAM(S): at 11:35

## 2017-09-28 RX ADMIN — OXYCODONE HYDROCHLORIDE 10 MILLIGRAM(S): 5 TABLET ORAL at 18:28

## 2017-09-28 RX ADMIN — LACTOBACILLUS ACIDOPH-L.BULGARICUS 1 MILLION CELL CHEWABLE TABLET 1 TABLET(S): at 22:21

## 2017-09-28 RX ADMIN — HEPARIN SODIUM 1200 UNIT(S)/HR: 5000 INJECTION INTRAVENOUS; SUBCUTANEOUS at 19:30

## 2017-09-28 RX ADMIN — HEPARIN SODIUM 12.5 UNIT(S)/HR: 5000 INJECTION INTRAVENOUS; SUBCUTANEOUS at 10:00

## 2017-09-28 RX ADMIN — Medication 1000 MILLIGRAM(S): at 01:00

## 2017-09-28 RX ADMIN — OXYCODONE HYDROCHLORIDE 10 MILLIGRAM(S): 5 TABLET ORAL at 18:58

## 2017-09-28 RX ADMIN — Medication 166.67 MILLIGRAM(S): at 19:04

## 2017-09-28 RX ADMIN — PANTOPRAZOLE SODIUM 40 MILLIGRAM(S): 20 TABLET, DELAYED RELEASE ORAL at 05:13

## 2017-09-28 RX ADMIN — POLYETHYLENE GLYCOL 3350 17 GRAM(S): 17 POWDER, FOR SOLUTION ORAL at 11:35

## 2017-09-28 RX ADMIN — Medication 166.67 MILLIGRAM(S): at 05:12

## 2017-09-28 RX ADMIN — WARFARIN SODIUM 6 MILLIGRAM(S): 2.5 TABLET ORAL at 22:22

## 2017-09-28 RX ADMIN — Medication 975 MILLIGRAM(S): at 14:35

## 2017-09-28 RX ADMIN — Medication 400 MILLIGRAM(S): at 00:40

## 2017-09-28 NOTE — PROGRESS NOTE ADULT - ASSESSMENT
63 yo man s/p LVAd placement 9/12/17 with leukocytosis and left sided pain. Unclear if his pain and leukocytosis are related to the left sided effusion. Given the persistence is both would favor a diagnostic thoracentesis with IR guidance to r/o infection source    Continue the Vancomycin and Zosyn pending final cultures results    Pleural fluid  is exudative and was sent for gram stain, culture, fungal stain, AFB stain/cx.,  all of which are negative to date.  Day 6 of Vanco/zosyn without  a definitive source  If pleural fluid cultures remain negative would consider repeating abdominal imaging

## 2017-09-28 NOTE — PROGRESS NOTE ADULT - SUBJECTIVE AND OBJECTIVE BOX
VITAL SIGNS-Tele: SR  trigeminy    Vital Signs Last 24 Hrs  T(F): 98.6 (17 @ 08:00), Max: 98.6 (17 @ 08:00)  HR: 85 (17 @ 08:00) (82 - 96)  BP: --  SpO2: 100% (17 @ 08:00) (96% - 100%)          @ 07:01  -   @ 07:00  --------------------------------------------------------  IN: 1606.5 mL / OUT: 1995 mL / NET: -388.5 mL     @ 07:01  -   @ 12:47  --------------------------------------------------------  IN: 600 mL / OUT: 800 mL / NET: -200 mL    Admit wt. ; 60kg    Daily Weight in k.7 (28 Sep 2017 08:38)    Drains:     MS         [  ] Drainage:                 L Pleural  [  ]  Drainage:                R Pleural  [  ]  Drainage:  Pacing Wires        [  ]   Settings:                                  Isolated  [  ]  Coumadin    [x ] YES          [  ]      NO         Reason:   LVAD      PHYSICAL EXAM:  Neurology: alert and oriented x 3, nonfocal, no gross deficits  CV : +LVAD sounds  Sternal Wound :  CDI , Stable  Lungs: CTA  Abdomen: soft, nontender, nondistended, positive bowel sounds, last bowel movement         Extremities:   no edema, no calf tenderness      aspirin enteric coated 81 milliGRAM(s) Oral daily  docusate sodium 100 milliGRAM(s) Oral three times a day  polyethylene glycol 3350 17 Gram(s) Oral daily  pantoprazole    Tablet 40 milliGRAM(s) Oral before breakfast  bisacodyl Suppository 10 milliGRAM(s) Rectal daily PRN  lactobacillus acidophilus and bulgaricus Chewable 1 Tablet(s) Chew three times a day  vancomycin  IVPB 1250 milliGRAM(s) IV Intermittent every 12 hours  vancomycin  IVPB      piperacillin/tazobactam IVPB. 3.375 Gram(s) IV Intermittent every 8 hours  sorbitol 70% Solution 30 milliLiter(s) Oral once  senna 2 Tablet(s) Oral at bedtime  oxyCODONE  ER Tablet 10 milliGRAM(s) Oral every 12 hours  acetaminophen   Tablet. 650 milliGRAM(s) Oral every 6 hours PRN  lisinopril 5 milliGRAM(s) Oral daily  heparin  Infusion 1100 Unit(s)/Hr IV Continuous <Continuous>  acetaminophen   Tablet. 975 milliGRAM(s) Oral every 8 hours      Physical Therapy Rec:   Home  [  ]   Home w/ PT  [  ]  Rehab  [  ]  Discussed with Cardiothoracic Team at AM rounds.

## 2017-09-28 NOTE — PROGRESS NOTE ADULT - ASSESSMENT
62M with no PMHx presented with sob and abdominal pain  on 8/25/17.  Cta - for PE,  cardiac mRI and cath revealed   lv dysfunction and MR.  Cardiology was consulted, HF service consulted and the pt was placed in ccu with pulmonary edema. He had  low cardiac output and cardiogenic shock - LVAD evaluation. ID cleared for surgery.. GI was following for abd pain, no significant findings on virtual colonoscopy. He was treated for chf  with  afterload reduction  and milrinone and stabilized, euvolemic .    9/12/17 S/P Implantation of HeartMate II left ventricular assist device through median sternotomy, and repair of the tricuspid valve with a number 28 classic Christianson ring.  Post op:  +inotropic support  PRBc's for acute blood loss anemia d/t surgery  9/18 tr. Step down  He has c/o pain at the incision site and chest tube site  Chest tube d/c'd  9/20  Pain improved .  9/21 Lisinopril increased for BP and afterload  Leukocytosis, afebrile  Hyponatremia, off lasix  9/23 Leukocytosis WBC 19  CT chest/abd/pelvis ordered  blood cx x2  ID consulted fluid bolus .9  per Dr Iyer low sodium, diet d/c regular diet ordered INR 2.08 heparin gtt d/c coumadin 5 tonight  9/24 cta done 2nd limited study prior.  Abdominal discomfort relieved with pain meds, d/w pain service, oxycontin recommended .  Ct scan with contrast reviewed by Dr Cadet.-loculated LLL effusion vs atelectasis, wbc improving but remains on antibiotics.    await IR tap of loculated effusion and cultures to be sent.  Low map, ivf and decrease lisinopril  9/27/17 L loculated Pleural effusion tapped in IR - 350cc initial drainage  initial gram stain negative- this am  9/27/17 IV tylenol given overnight for pain  9/28/17 - pt. placed on 975mg tylenol ATC q8h to attempt to minimize pain  anticoagulate  d/c planning

## 2017-09-28 NOTE — PROGRESS NOTE ADULT - PROBLEM SELECTOR PLAN 1
Continue lisinopril 5 mg QD  decrease for hypotension  No diuretics d/t hyponatremia-Na+ 124 this am

## 2017-09-28 NOTE — PROGRESS NOTE ADULT - SUBJECTIVE AND OBJECTIVE BOX
Interventional Radiology Follow- Up Note      62y Male s/p left chest tube  on 9/28 in Interventional Radiology with Dr. Oakes. Reports pain at the drain site. No SOB          Vitals: T(F): 98.6 (09-28-17 @ 08:00), Max: 98.6 (09-27-17 @ 12:00)  HR: 85 (09-28-17 @ 08:00) (82 - 96)  BP: --  RR: 18 (09-28-17 @ 08:00) (16 - 19)  SpO2: 100% (09-28-17 @ 08:00) (96% - 100%)  Wt(kg): --    LABS:                        8.8    13.3  )-----------( 467      ( 28 Sep 2017 01:48 )             26.0     09-28    124<L>  |  88<L>  |  16  ----------------------------<  116<H>  4.5   |  24  |  0.56    Ca    8.7      28 Sep 2017 01:49    TPro  6.9  /  Alb  2.9<L>  /  TBili  0.4  /  DBili  x   /  AST  27  /  ALT  33  /  AlkPhos  122<H>  09-28    PT/INR - ( 27 Sep 2017 04:06 )   PT: 19.8 sec;   INR: 1.79 ratio         PTT - ( 28 Sep 2017 01:48 )  PTT:59.4 sec    Culture: No growth  output : 45CC dark blood    PHYSICAL EXAM:  General: Nontoxic, in NAD  drain site: Dressing c/d/i. Pleura vac with dark bloody output.    Impression: 62y M s/p left chest tube    Plan:  -continue to monitor out put    -trend vitals, labs  - chest xray  -will discuss with IR attending     Please call IR at extension [phone] or [phone] with any questions, concerns, or issues regarding above.

## 2017-09-28 NOTE — PROGRESS NOTE ADULT - PROBLEM SELECTOR PLAN 1
The left side pig tail catheter is placed: dark bloody fluid removed: Has significantly high LDH but ph is OK: Exudative fluid: there are no organisms growing in the fluid.  Patient is on broad spectrum antibiotics : ID is following

## 2017-09-28 NOTE — PROGRESS NOTE ADULT - ASSESSMENT
Mr. Quispe is a 62 year old man with a nonischemic cardiomyopathy with ACC/AHA stage D congestive heart failure who presented with abdominal pain due to cardiogenic shock, now s/p Heart Mate II LVAD with TV annuloplasty ring on 9/12. His postoperative course was complicated by bleeding requiring transfusions, which has stabilitzed. He currently is euvolemic without evidence of right heart failure. His INR is slightly subtherapeutic and his MAPs are at goal. Mr. Quispe is a 62 year old man with a nonischemic cardiomyopathy with ACC/AHA stage D congestive heart failure who presented with abdominal pain due to cardiogenic shock, now s/p Heart Mate II LVAD with TV annuloplasty ring on 9/12. His postoperative course was complicated by bleeding requiring transfusions, which has stabilitzed. He currently is euvolemic without evidence of right heart failure. His INR is slightly subtherapeutic and his MAPs are at goal. Pt is s/p thoracentesis and chest tube placement 9/27 presenting now  with reports of fatigue and discomfort at chest tube site initial drainage 800 cc serosang with chest tube drainage 45 cc overnight , Maps stable today

## 2017-09-28 NOTE — PROGRESS NOTE ADULT - ATTENDING COMMENTS
Got thoracentesis yesterday; 800 cc; micro pending, but appears exudative.  WBC down.  Pain essentially the same.  Continue abx.

## 2017-09-28 NOTE — PROGRESS NOTE ADULT - SUBJECTIVE AND OBJECTIVE BOX
Patient is a 62y old  Male who presents with a chief complaint of SOOB (25 Aug 2017 22:27)  s/p pig tail catheter left side yesterday  he has pain  but says his breathing is OK.      Any change in ROS:     MEDICATIONS  (STANDING):  aspirin enteric coated 81 milliGRAM(s) Oral daily  docusate sodium 100 milliGRAM(s) Oral three times a day  polyethylene glycol 3350 17 Gram(s) Oral daily  pantoprazole    Tablet 40 milliGRAM(s) Oral before breakfast  lactobacillus acidophilus and bulgaricus Chewable 1 Tablet(s) Chew three times a day  vancomycin  IVPB 1250 milliGRAM(s) IV Intermittent every 12 hours  vancomycin  IVPB      piperacillin/tazobactam IVPB. 3.375 Gram(s) IV Intermittent every 8 hours  sorbitol 70% Solution 30 milliLiter(s) Oral once  senna 2 Tablet(s) Oral at bedtime  oxyCODONE  ER Tablet 10 milliGRAM(s) Oral every 12 hours  lisinopril 5 milliGRAM(s) Oral daily  heparin  Infusion 1100 Unit(s)/Hr (12.5 mL/Hr) IV Continuous <Continuous>    MEDICATIONS  (PRN):  bisacodyl Suppository 10 milliGRAM(s) Rectal daily PRN Constipation  acetaminophen   Tablet. 650 milliGRAM(s) Oral every 6 hours PRN Mild Pain (1 - 3)    Vital Signs Last 24 Hrs  T(C): 37 (28 Sep 2017 08:00), Max: 37 (27 Sep 2017 12:00)  T(F): 98.6 (28 Sep 2017 08:00), Max: 98.6 (27 Sep 2017 12:00)  HR: 85 (28 Sep 2017 08:00) (82 - 96)  BP: --  BP(mean): 78 (28 Sep 2017 04:00) (66 - 78)  RR: 18 (28 Sep 2017 08:00) (16 - 19)  SpO2: 100% (28 Sep 2017 08:00) (96% - 100%)    I&O's Summary    27 Sep 2017 07:01  -  28 Sep 2017 07:00  --------------------------------------------------------  IN: 1606.5 mL / OUT: 1995 mL / NET: -388.5 mL    28 Sep 2017 07:01  -  28 Sep 2017 11:04  --------------------------------------------------------  IN: 360 mL / OUT: 500 mL / NET: -140 mL          Physical Exam:   GENERAL: cachectic  HEENT: ALONSO/   Atraumatic, Normocephalic  ENMT: No tonsillar erythema, exudates, or enlargement; Moist mucous membranes, Good dentition, No lesions  NECK: Supple, No JVD, Normal thyroid  CHEST/LUNG: decreased air entry bilaterally   CVS: Regular rate and rhythm; No murmurs, rubs, or gallops  GI: : Soft, Nontender, Nondistended; Bowel sounds present  NERVOUS SYSTEM:  Alert & Oriented X3  EXTREMITIES:  2+ Peripheral Pulses, No clubbing, cyanosis, or edema  LYMPH: No lymphadenopathy noted  SKIN: No rashes or lesions  ENDOCRINOLOGY: No Thyromegaly  PSYCH: Appropriate    Labs:                              8.8    13.3  )-----------( 467      ( 28 Sep 2017 01:48 )             26.0                         9.6    16.3  )-----------( 476      ( 27 Sep 2017 04:06 )             28.7                         8.7    16.5  )-----------( 456      ( 26 Sep 2017 05:40 )             25.3                         9.3    19.8  )-----------( 467      ( 25 Sep 2017 05:41 )             27.7     09-28    124<L>  |  88<L>  |  16  ----------------------------<  116<H>  4.5   |  24  |  0.56  09-27    127<L>  |  88<L>  |  18  ----------------------------<  195<H>  4.5   |  25  |  0.66  09-26    124<L>  |  88<L>  |  18  ----------------------------<  93  4.5   |  25  |  0.65  09-25    125<L>  |  88<L>  |  15  ----------------------------<  106<H>  4.7   |  26  |  0.56    Ca    8.7      28 Sep 2017 01:49  Ca    8.9      27 Sep 2017 04:06    TPro  6.9  /  Alb  2.9<L>  /  TBili  0.4  /  DBili  x   /  AST  27  /  ALT  33  /  AlkPhos  122<H>  09-28    CAPILLARY BLOOD GLUCOSE          LIVER FUNCTIONS - ( 28 Sep 2017 01:49 )  Alb: 2.9 g/dL / Pro: 6.9 g/dL / ALK PHOS: 122 U/L / ALT: 33 U/L RC / AST: 27 U/L / GGT: x           PT/INR - ( 28 Sep 2017 09:41 )   PT: 17.3 sec;   INR: 1.57 ratio         PTT - ( 28 Sep 2017 09:41 )  PTT:64.7 sec    Fluid Source --  Albumin, Fluid2.3 g/dL  Glucose, Fluid98 mg/dL  Protein total, Fluid4.4 g/dL  Lacatate Dehydrogenase, [id number] U/L  pH, Fluid7.80  Cytopathology-Non Gyn Report--  Cultures:     Vancomycin Trough:     09-27 @ 04:06  14.2    Gentamicin:          09-27 @ 04:06  --          s/p pig tail catheter dark bloody output                   Studies  Chest X-RAY  CT SCAN Chest   Venous Dopplers: LE:   CT Abdomen  Others

## 2017-09-28 NOTE — PROGRESS NOTE ADULT - SUBJECTIVE AND OBJECTIVE BOX
Subjective:    Medications:  aspirin enteric coated 81 milliGRAM(s) Oral daily  docusate sodium 100 milliGRAM(s) Oral three times a day  polyethylene glycol 3350 17 Gram(s) Oral daily  pantoprazole    Tablet 40 milliGRAM(s) Oral before breakfast  bisacodyl Suppository 10 milliGRAM(s) Rectal daily PRN  lactobacillus acidophilus and bulgaricus Chewable 1 Tablet(s) Chew three times a day  vancomycin  IVPB 1250 milliGRAM(s) IV Intermittent every 12 hours  piperacillin/tazobactam IVPB. 3.375 Gram(s) IV Intermittent every 8 hours  sorbitol 70% Solution 30 milliLiter(s) Oral once  senna 2 Tablet(s) Oral at bedtime  oxyCODONE  ER Tablet 10 milliGRAM(s) Oral every 12 hours  acetaminophen   Tablet. 650 milliGRAM(s) Oral every 6 hours PRN  lisinopril 5 milliGRAM(s) Oral daily  heparin  Infusion 1100 Unit(s)/Hr IV Continuous <Continuous>      PHYSICAL EXAM:  Vital Signs Last 24 Hrs  T(C): 37 (28 Sep 2017 08:00), Max: 37 (27 Sep 2017 12:00)  T(F): 98.6 (28 Sep 2017 08:00), Max: 98.6 (27 Sep 2017 12:00)  HR: 85 (28 Sep 2017 08:00) (82 - 96)  BP(mean): 78 (28 Sep 2017 04:00) (66 - 78)  RR: 18 (28 Sep 2017 08:00) (16 - 19)  SpO2: 100% (28 Sep 2017 08:00) (96% - 100%)    27 Sep 2017 07:01  -  28 Sep 2017 07:00  --------------------------------------------------------  IN:    heparin Infusion: 146.5 mL    Oral Fluid: 1110 mL    Solution: 350 mL  Total IN: 1606.5 mL    OUT:    Chest Tube: 45 mL    Voided: 1950 mL  Total OUT: 1995 mL    Total NET: -388.5 mL        General: A/ox 3, No acute Distress  Neck: Supple, JVD  Cardiac: S1 S2, No M/R/G  Pulmonary: CTAB, Breathing unlabored, No Rhonchi/Rales/Wheezing  Abdomen: Soft, Non -tender, +BS   Extremities: No Rashes, No edema  Neuro: A/o x 3, No focal deficits  Psch: normal mood , normal affect                          8.8    13.3  )-----------( 467      ( 28 Sep 2017 01:48 )             26.0     09-28    124<L>  |  88<L>  |  16  ----------------------------<  116<H>  4.5   |  24  |  0.56    Ca    8.7      28 Sep 2017 01:49    TPro  6.9  /  Alb  2.9<L>  /  TBili  0.4  /  DBili  x   /  AST  27  /  ALT  33  /  AlkPhos  122<H>  09-28    P:59.4 sec     Subjective:  pt sitting in chair     Medications:  aspirin enteric coated 81 milliGRAM(s) Oral daily  docusate sodium 100 milliGRAM(s) Oral three times a day  polyethylene glycol 3350 17 Gram(s) Oral daily  pantoprazole    Tablet 40 milliGRAM(s) Oral before breakfast  bisacodyl Suppository 10 milliGRAM(s) Rectal daily PRN  lactobacillus acidophilus and bulgaricus Chewable 1 Tablet(s) Chew three times a day  vancomycin  IVPB 1250 milliGRAM(s) IV Intermittent every 12 hours  piperacillin/tazobactam IVPB. 3.375 Gram(s) IV Intermittent every 8 hours  sorbitol 70% Solution 30 milliLiter(s) Oral once  senna 2 Tablet(s) Oral at bedtime  oxyCODONE  ER Tablet 10 milliGRAM(s) Oral every 12 hours  acetaminophen   Tablet. 650 milliGRAM(s) Oral every 6 hours PRN  lisinopril 5 milliGRAM(s) Oral daily  heparin  Infusion 1100 Unit(s)/Hr IV Continuous <Continuous>      PHYSICAL EXAM:  Vital Signs Last 24 Hrs  T(C): 37 (28 Sep 2017 08:00), Max: 37 (27 Sep 2017 12:00)  T(F): 98.6 (28 Sep 2017 08:00), Max: 98.6 (27 Sep 2017 12:00)  HR: 85 (28 Sep 2017 08:00) (82 - 96)  BP(mean): 78 (28 Sep 2017 04:00) (66 - 78)  RR: 18 (28 Sep 2017 08:00) (16 - 19)  SpO2: 100% (28 Sep 2017 08:00) (96% - 100%)    27 Sep 2017 07:01  -  28 Sep 2017 07:00  --------------------------------------------------------  IN:    heparin Infusion: 146.5 mL    Oral Fluid: 1110 mL    Solution: 350 mL  Total IN: 1606.5 mL    OUT:    Chest Tube: 45 mL    Voided: 1950 mL  Total OUT: 1995 mL    Total NET: -388.5 mL        General: A/ox 3, No acute Distress  Neck: Supple, JVD normal   Cardiac: S1 S2, LVAD sounds   Pulmonary: CTAB, Breathing unlabored, No Rhonchi/Rales/Wheezing  Abdomen: Soft, Non -tender, +BS   Extremities: No Rashes, No edema  Neuro: A/o x 3, No focal deficits  Psch: normal mood , normal affect  LVAD Interrogation:  Pump Flow:5.1  Pump Speed:9000   Pulse Index:5.2  Pump Power:5.3  VAD Events:   Driveline evaluation:  clean and dry   Programming Changes:none                         8.8    13.3  )-----------( 467      ( 28 Sep 2017 01:48 )             26.0     09-28    124<L>  |  88<L>  |  16  ----------------------------<  116<H>  4.5   |  24  |  0.56    Ca    8.7      28 Sep 2017 01:49    TPro  6.9  /  Alb  2.9<L>  /  TBili  0.4  /  DBili  x   /  AST  27  /  ALT  33  /  AlkPhos  122<H>  09-28    P:59.4 sec

## 2017-09-28 NOTE — PROGRESS NOTE ADULT - PROBLEM SELECTOR PLAN 6
PRN pain medication   L Pigtail catheter placed 9/27/17 @ IR -  ? will d/c today - will d/w DR. Cadet/Marisa  tylenol 975 ATC q8h for better pain control

## 2017-09-28 NOTE — PROGRESS NOTE ADULT - PROBLEM SELECTOR PLAN 2
stable: His CT scan is reviewed: small loculated effusion: doubt any infection, for possible diagnostic tap today, to rule out any infection: He had small pleural effusion on the left side on 8/25 ct scan chest : This seems to be simple loculated effusion ( secondary to chronicity): The etiology of abdominal pain Is not clear: , though it is chronic!!  8/27/2017: for tap today  s/p tap yesterday: await cultures/: exudative fluid!!

## 2017-09-28 NOTE — PROGRESS NOTE ADULT - PROBLEM SELECTOR PLAN 6
PRN pain medication   to IR today for L sided effusion evaluation PRN pain medication   to IR  with s/p chest tube

## 2017-09-28 NOTE — PROGRESS NOTE ADULT - SUBJECTIVE AND OBJECTIVE BOX
INFECTIOUS DISEASES FOLLOW UP-- Anitra Prater  [phone]    This is a follow up note for this  62yMale with s/p LVAd placement 9/12 elevated WBC and s/p CT tube placement for an exudative pleural effusion yesterday. The plreural fluid cultures are NGTD      ROS: pain persists, left side abdomen and lower chest near CT tube  CONSTITUTIONAL:  No fever, good appetite  CARDIOVASCULAR:  No chest pain or palpitations  RESPIRATORY:  No dyspnea  GASTROINTESTINAL:  No nausea, vomiting, diarrhea, or abdominal pain  GENITOURINARY:  No dysuria  NEUROLOGIC:  No headache,     Allergies    No Known Allergies    Intolerances        ANTIBIOTICS/RELEVANT:  antimicrobials  vancomycin  IVPB 1250 milliGRAM(s) IV Intermittent every 12 hours  vancomycin  IVPB      piperacillin/tazobactam IVPB. 3.375 Gram(s) IV Intermittent every 8 hours    immunologic:    OTHER:  aspirin enteric coated 81 milliGRAM(s) Oral daily  docusate sodium 100 milliGRAM(s) Oral three times a day  polyethylene glycol 3350 17 Gram(s) Oral daily  pantoprazole    Tablet 40 milliGRAM(s) Oral before breakfast  bisacodyl Suppository 10 milliGRAM(s) Rectal daily PRN  lactobacillus acidophilus and bulgaricus Chewable 1 Tablet(s) Chew three times a day  sorbitol 70% Solution 30 milliLiter(s) Oral once  senna 2 Tablet(s) Oral at bedtime  oxyCODONE  ER Tablet 10 milliGRAM(s) Oral every 12 hours  acetaminophen   Tablet. 650 milliGRAM(s) Oral every 6 hours PRN  lisinopril 5 milliGRAM(s) Oral daily  heparin  Infusion 1100 Unit(s)/Hr IV Continuous <Continuous>  acetaminophen   Tablet. 975 milliGRAM(s) Oral every 8 hours  warfarin 6 milliGRAM(s) Oral once      Objective:  Vital Signs Last 24 Hrs  T(C): 37 (28 Sep 2017 08:00), Max: 37 (28 Sep 2017 08:00)  T(F): 98.6 (28 Sep 2017 08:00), Max: 98.6 (28 Sep 2017 08:00)  HR: 85 (28 Sep 2017 08:00) (82 - 96)  BP: --  BP(mean): 78 (28 Sep 2017 04:00) (76 - 78)  RR: 18 (28 Sep 2017 08:00) (16 - 19)  SpO2: 100% (28 Sep 2017 08:00) (96% - 100%)    PHYSICAL EXAM:  Constitutional:no acute distress  Eyes:ALONSO, EOMI  Ear/Nose/Throat: no oral lesions, 	  Respiratory: clear BL  Cardiovascular: S1S2  Gastrointestinal:soft, (+) BS, no tenderness  Extremities:no e/e/c  No Lymphadenopathy  IV sites not inflammed.    LABS:                        8.8    13.3  )-----------( 467      ( 28 Sep 2017 01:48 )             26.0     09-28    124<L>  |  88<L>  |  16  ----------------------------<  116<H>  4.5   |  24  |  0.56    Ca    8.7      28 Sep 2017 01:49    TPro  6.9  /  Alb  2.9<L>  /  TBili  0.4  /  DBili  x   /  AST  27  /  ALT  33  /  AlkPhos  122<H>  09-28    PT/INR - ( 28 Sep 2017 09:41 )   PT: 17.3 sec;   INR: 1.57 ratio         PTT - ( 28 Sep 2017 09:41 )  PTT:64.7 sec      MICROBIOLOGY:  Culture - Fungal, Body Fluid (09.28.17 @ 00:32)    Specimen Source: .Body Fluid Pleural Fluid    Culture Results:   Testing in progress            Culture - Body Fluid with Gram Stain (09.28.17 @ 00:32)    Gram Stain:   polymorphonuclear leukocytes seen  No organisms seen  by cytocentrifuge    Specimen Source: .Body Fluid Pleural Fluid  Culture - Acid Fast - Body Fluid w/Smear (09.28.17 @ 00:32)    Specimen Source: .Body Fluid Pleural Fluid    Acid Fast Bacilli Smear:   No acid fast bacilli seen by fluorochrome stain        RECENT CULTURES:    Culture - Blood (09.23.17 @ 09:54)    Specimen Source: .Blood Blood-Peripheral    Culture Results:   No growth at 5 days.      RADIOLOGY & ADDITIONAL STUDIES:

## 2017-09-29 LAB
ANION GAP SERPL CALC-SCNC: 12 MMOL/L — SIGNIFICANT CHANGE UP (ref 5–17)
APTT BLD: 70.6 SEC — HIGH (ref 27.5–37.4)
APTT BLD: 74.4 SEC — HIGH (ref 27.5–37.4)
APTT BLD: 75.8 SEC — HIGH (ref 27.5–37.4)
BUN SERPL-MCNC: 12 MG/DL — SIGNIFICANT CHANGE UP (ref 7–23)
CALCIUM SERPL-MCNC: 9.1 MG/DL — SIGNIFICANT CHANGE UP (ref 8.4–10.5)
CHLORIDE SERPL-SCNC: 89 MMOL/L — LOW (ref 96–108)
CO2 SERPL-SCNC: 24 MMOL/L — SIGNIFICANT CHANGE UP (ref 22–31)
CREAT SERPL-MCNC: 0.6 MG/DL — SIGNIFICANT CHANGE UP (ref 0.5–1.3)
GLUCOSE SERPL-MCNC: 174 MG/DL — HIGH (ref 70–99)
HCT VFR BLD CALC: 29.6 % — LOW (ref 39–50)
HGB BLD-MCNC: 9.8 G/DL — LOW (ref 13–17)
INR BLD: 1.66 RATIO — HIGH (ref 0.88–1.16)
LDH SERPL L TO P-CCNC: 384 U/L — HIGH (ref 50–242)
MCHC RBC-ENTMCNC: 32 PG — SIGNIFICANT CHANGE UP (ref 27–34)
MCHC RBC-ENTMCNC: 33 GM/DL — SIGNIFICANT CHANGE UP (ref 32–36)
MCV RBC AUTO: 97 FL — SIGNIFICANT CHANGE UP (ref 80–100)
PLATELET # BLD AUTO: 451 K/UL — HIGH (ref 150–400)
POTASSIUM SERPL-MCNC: 4.2 MMOL/L — SIGNIFICANT CHANGE UP (ref 3.5–5.3)
POTASSIUM SERPL-SCNC: 4.2 MMOL/L — SIGNIFICANT CHANGE UP (ref 3.5–5.3)
PROTHROM AB SERPL-ACNC: 18.1 SEC — HIGH (ref 9.8–12.7)
RBC # BLD: 3.05 M/UL — LOW (ref 4.2–5.8)
RBC # FLD: 13.2 % — SIGNIFICANT CHANGE UP (ref 10.3–14.5)
SODIUM SERPL-SCNC: 125 MMOL/L — LOW (ref 135–145)
WBC # BLD: 12.8 K/UL — HIGH (ref 3.8–10.5)
WBC # FLD AUTO: 12.8 K/UL — HIGH (ref 3.8–10.5)

## 2017-09-29 PROCEDURE — 71010: CPT | Mod: 26,77

## 2017-09-29 PROCEDURE — 99232 SBSQ HOSP IP/OBS MODERATE 35: CPT

## 2017-09-29 PROCEDURE — 99024 POSTOP FOLLOW-UP VISIT: CPT

## 2017-09-29 PROCEDURE — 71010: CPT | Mod: 26

## 2017-09-29 PROCEDURE — 93750 INTERROGATION VAD IN PERSON: CPT

## 2017-09-29 PROCEDURE — 99233 SBSQ HOSP IP/OBS HIGH 50: CPT | Mod: 25

## 2017-09-29 RX ORDER — WARFARIN SODIUM 2.5 MG/1
6 TABLET ORAL ONCE
Qty: 0 | Refills: 0 | Status: COMPLETED | OUTPATIENT
Start: 2017-09-29 | End: 2017-09-29

## 2017-09-29 RX ORDER — LIDOCAINE 4 G/100G
1 CREAM TOPICAL DAILY
Qty: 0 | Refills: 0 | Status: DISCONTINUED | OUTPATIENT
Start: 2017-09-29 | End: 2017-10-25

## 2017-09-29 RX ORDER — OXYCODONE HYDROCHLORIDE 5 MG/1
5 TABLET ORAL EVERY 6 HOURS
Qty: 0 | Refills: 0 | Status: DISCONTINUED | OUTPATIENT
Start: 2017-09-29 | End: 2017-10-01

## 2017-09-29 RX ADMIN — Medication 975 MILLIGRAM(S): at 12:58

## 2017-09-29 RX ADMIN — OXYCODONE HYDROCHLORIDE 10 MILLIGRAM(S): 5 TABLET ORAL at 05:05

## 2017-09-29 RX ADMIN — Medication 100 MILLIGRAM(S): at 05:07

## 2017-09-29 RX ADMIN — LACTOBACILLUS ACIDOPH-L.BULGARICUS 1 MILLION CELL CHEWABLE TABLET 1 TABLET(S): at 12:30

## 2017-09-29 RX ADMIN — Medication 166.67 MILLIGRAM(S): at 17:06

## 2017-09-29 RX ADMIN — LACTOBACILLUS ACIDOPH-L.BULGARICUS 1 MILLION CELL CHEWABLE TABLET 1 TABLET(S): at 05:08

## 2017-09-29 RX ADMIN — Medication 975 MILLIGRAM(S): at 12:28

## 2017-09-29 RX ADMIN — OXYCODONE HYDROCHLORIDE 10 MILLIGRAM(S): 5 TABLET ORAL at 17:06

## 2017-09-29 RX ADMIN — Medication 100 MILLIGRAM(S): at 12:30

## 2017-09-29 RX ADMIN — PIPERACILLIN AND TAZOBACTAM 25 GRAM(S): 4; .5 INJECTION, POWDER, LYOPHILIZED, FOR SOLUTION INTRAVENOUS at 14:27

## 2017-09-29 RX ADMIN — Medication 100 MILLIGRAM(S): at 21:46

## 2017-09-29 RX ADMIN — Medication 81 MILLIGRAM(S): at 12:29

## 2017-09-29 RX ADMIN — Medication 166.67 MILLIGRAM(S): at 05:15

## 2017-09-29 RX ADMIN — SENNA PLUS 2 TABLET(S): 8.6 TABLET ORAL at 21:46

## 2017-09-29 RX ADMIN — PIPERACILLIN AND TAZOBACTAM 25 GRAM(S): 4; .5 INJECTION, POWDER, LYOPHILIZED, FOR SOLUTION INTRAVENOUS at 05:15

## 2017-09-29 RX ADMIN — LISINOPRIL 5 MILLIGRAM(S): 2.5 TABLET ORAL at 05:07

## 2017-09-29 RX ADMIN — HEPARIN SODIUM 1200 UNIT(S)/HR: 5000 INJECTION INTRAVENOUS; SUBCUTANEOUS at 03:00

## 2017-09-29 RX ADMIN — WARFARIN SODIUM 6 MILLIGRAM(S): 2.5 TABLET ORAL at 21:46

## 2017-09-29 RX ADMIN — OXYCODONE HYDROCHLORIDE 10 MILLIGRAM(S): 5 TABLET ORAL at 05:35

## 2017-09-29 RX ADMIN — PANTOPRAZOLE SODIUM 40 MILLIGRAM(S): 20 TABLET, DELAYED RELEASE ORAL at 05:07

## 2017-09-29 RX ADMIN — HEPARIN SODIUM 1200 UNIT(S)/HR: 5000 INJECTION INTRAVENOUS; SUBCUTANEOUS at 12:32

## 2017-09-29 RX ADMIN — OXYCODONE HYDROCHLORIDE 10 MILLIGRAM(S): 5 TABLET ORAL at 17:40

## 2017-09-29 RX ADMIN — LIDOCAINE 1 PATCH: 4 CREAM TOPICAL at 17:06

## 2017-09-29 RX ADMIN — POLYETHYLENE GLYCOL 3350 17 GRAM(S): 17 POWDER, FOR SOLUTION ORAL at 12:30

## 2017-09-29 NOTE — PROGRESS NOTE ADULT - SUBJECTIVE AND OBJECTIVE BOX
Patient is a 62y old  Male who presents with a chief complaint of SOOB (25 Aug 2017 22:27)    chest tune on left side  doesnot look happy  still has abdominal pain   Any change in ROS:     MEDICATIONS  (STANDING):  aspirin enteric coated 81 milliGRAM(s) Oral daily  docusate sodium 100 milliGRAM(s) Oral three times a day  polyethylene glycol 3350 17 Gram(s) Oral daily  pantoprazole    Tablet 40 milliGRAM(s) Oral before breakfast  lactobacillus acidophilus and bulgaricus Chewable 1 Tablet(s) Chew three times a day  vancomycin  IVPB 1250 milliGRAM(s) IV Intermittent every 12 hours  vancomycin  IVPB      piperacillin/tazobactam IVPB. 3.375 Gram(s) IV Intermittent every 8 hours  sorbitol 70% Solution 30 milliLiter(s) Oral once  senna 2 Tablet(s) Oral at bedtime  oxyCODONE  ER Tablet 10 milliGRAM(s) Oral every 12 hours  lisinopril 5 milliGRAM(s) Oral daily  acetaminophen   Tablet. 975 milliGRAM(s) Oral every 8 hours  heparin  Infusion. 1200 Unit(s)/Hr (12 mL/Hr) IV Continuous <Continuous>    MEDICATIONS  (PRN):  bisacodyl Suppository 10 milliGRAM(s) Rectal daily PRN Constipation  heparin  Injectable 4500 Unit(s) IV Push every 6 hours PRN For aPTT less than 40  heparin  Injectable 2000 Unit(s) IV Push every 6 hours PRN For aPTT between 40 - 57    Vital Signs Last 24 Hrs  T(C): 36.3 (29 Sep 2017 11:19), Max: 36.9 (28 Sep 2017 20:00)  T(F): 97.3 (29 Sep 2017 11:19), Max: 98.5 (29 Sep 2017 08:00)  HR: 86 (29 Sep 2017 11:19) (83 - 92)  BP: --  BP(mean): 78 (29 Sep 2017 11:19) (75 - 84)  RR: 18 (29 Sep 2017 11:19) (18 - 18)  SpO2: 100% (29 Sep 2017 11:19) (99% - 100%)    I&O's Summary    28 Sep 2017 07:01  -  29 Sep 2017 07:00  --------------------------------------------------------  IN: 2324 mL / OUT: 1985 mL / NET: 339 mL    29 Sep 2017 07:01  -  29 Sep 2017 11:37  --------------------------------------------------------  IN: 0 mL / OUT: 500 mL / NET: -500 mL          Physical Exam:   GENERAL: cachectic   HEENT: ALONSO/   Atraumatic, Normocephalic  ENMT: No tonsillar erythema, exudates, or enlargement; Moist mucous membranes, Good dentition, No lesions  NECK: Supple, No JVD, Normal thyroid  CHEST/LUNG: Clear to auscultaion, ; No rales, rhonchi, wheezing, or rubs  CVS: Regular rate and rhythm; No murmurs, rubs, or gallops  GI: :mild tenderness  NERVOUS SYSTEM:  Alert & Oriented X3  EXTREMITIES:  2+ Peripheral Pulses, No clubbing, cyanosis, or edema  LYMPH: No lymphadenopathy noted  SKIN: No rashes or lesions  ENDOCRINOLOGY: No Thyromegaly  PSYCH: Appropriate    Labs:                              9.8    12.8  )-----------( 451      ( 29 Sep 2017 02:03 )             29.6                         8.8    13.3  )-----------( 467      ( 28 Sep 2017 01:48 )             26.0                         9.6    16.3  )-----------( 476      ( 27 Sep 2017 04:06 )             28.7                         8.7    16.5  )-----------( 456      ( 26 Sep 2017 05:40 )             25.3     09-29    125<L>  |  89<L>  |  12  ----------------------------<  174<H>  4.2   |  24  |  0.60  09-28    124<L>  |  88<L>  |  16  ----------------------------<  116<H>  4.5   |  24  |  0.56  09-27    127<L>  |  88<L>  |  18  ----------------------------<  195<H>  4.5   |  25  |  0.66  09-26    124<L>  |  88<L>  |  18  ----------------------------<  93  4.5   |  25  |  0.65    Ca    9.1      29 Sep 2017 02:03  Ca    8.7      28 Sep 2017 01:49    TPro  6.9  /  Alb  2.9<L>  /  TBili  0.4  /  DBili  x   /  AST  27  /  ALT  33  /  AlkPhos  122<H>  09-28    CAPILLARY BLOOD GLUCOSE          LIVER FUNCTIONS - ( 28 Sep 2017 01:49 )  Alb: 2.9 g/dL / Pro: 6.9 g/dL / ALK PHOS: 122 U/L / ALT: 33 U/L RC / AST: 27 U/L / GGT: x           PT/INR - ( 29 Sep 2017 09:17 )   PT: 18.1 sec;   INR: 1.66 ratio         PTT - ( 29 Sep 2017 09:17 )  PTT:74.4 sec    Fluid Source --  Albumin, Fluid2.3 g/dL  Glucose, Fluid98 mg/dL  Protein total, Fluid4.4 g/dL  Lacatate Dehydrogenase, [id number] U/L  pH, Fluid7.80  Cytopathology-Non Gyn Report--  Cultures:     Vancomycin Trough:     09-27 @ 04:06  14.2    Gentamicin:          09-27 @ 04:06  --            < from: CT Angio Abdomen and Pelvis w/ IV Cont (09.24.17 @ 17:11) >  JULISSA WALL: Within normal limits.  BONES: Status post sternotomy. Degenerative spinal changes most prominent   at the level of L4 and L5 with disc height loss and mild retrolisthesis   of L4 on L5.    IMPRESSION:     Unchanged small left loculated pleural effusion with associated   compressive atelectasis of the left lower lobe.    Small amount of air within the bladder. Correlate for recent   instrumentation and with urinalysis to exclude infection.                  KEILA PORTER M.D., RADIOLOGY RESIDENT  This document has been electronically signed.  LISE GREGORY M.D., ATTENDING RADIOLOGIST  This document has beenelectronically signed. Sep 25 2017 12:56PM        < end of copied text >                  Studies  Chest X-RAY  CT SCAN Chest   Venous Dopplers: LE:   CT Abdomen  Others

## 2017-09-29 NOTE — PROGRESS NOTE ADULT - SUBJECTIVE AND OBJECTIVE BOX
Im in pain    VITAL SIGNS    Telemetry:  NSR 80    Vital Signs Last 24 Hrs  T(C): 36.3 (09-29-17 @ 11:19), Max: 36.9 (09-28-17 @ 20:00)  T(F): 97.3 (09-29-17 @ 11:19), Max: 98.5 (09-29-17 @ 08:00)  HR: 86 (09-29-17 @ 11:19) (83 - 92)  BP: --78  RR: 18 (09-29-17 @ 11:19) (18 - 18)  SpO2: 100% (09-29-17 @ 11:19) (99% - 100%)                   09-28 @ 07:01  -  09-29 @ 07:00  --------------------------------------------------------  IN: 2324 mL / OUT: 1985 mL / NET: 339 mL    09-29 @ 07:01  -  09-29 @ 11:58  --------------------------------------------------------  IN: 0 mL / OUT: 500 mL / NET: -500 mL          Daily     Daily         CAPILLARY BLOOD GLUCOSE                      Coumadin    [x] YES          [  ]      NO         Reason:     lvad                          PHYSICAL EXAM        Neurology: alert and oriented x 3, nonfocal, no gross deficits  CV : +hum  Sternal Wound :  CDI , Stable  Lungs: cta  Drains:     MS         [  ] Drainage:                 L Pleural  [ x ]  Drainage:  15/35            R Pleural  [  ]  Drainage:  Abdomen: soft, nontender, nondistended, positive bowel sounds, last bowel movement    Extremities:       -  edema, negative calve tenderness,   leg incision cdi           aspirin enteric coated 81 milliGRAM(s) Oral daily  docusate sodium 100 milliGRAM(s) Oral three times a day  polyethylene glycol 3350 17 Gram(s) Oral daily  pantoprazole    Tablet 40 milliGRAM(s) Oral before breakfast  bisacodyl Suppository 10 milliGRAM(s) Rectal daily PRN  lactobacillus acidophilus and bulgaricus Chewable 1 Tablet(s) Chew three times a day  vancomycin  IVPB 1250 milliGRAM(s) IV Intermittent every 12 hours  vancomycin  IVPB      piperacillin/tazobactam IVPB. 3.375 Gram(s) IV Intermittent every 8 hours  sorbitol 70% Solution 30 milliLiter(s) Oral once  senna 2 Tablet(s) Oral at bedtime  oxyCODONE  ER Tablet 10 milliGRAM(s) Oral every 12 hours  lisinopril 5 milliGRAM(s) Oral daily  acetaminophen   Tablet. 975 milliGRAM(s) Oral every 8 hours  heparin  Infusion. 1200 Unit(s)/Hr IV Continuous <Continuous>  heparin  Injectable 4500 Unit(s) IV Push every 6 hours PRN  heparin  Injectable 2000 Unit(s) IV Push every 6 hours PRN  oxyCODONE    IR 5 milliGRAM(s) Oral every 6 hours PRN                    Physical Therapy Rec:   Home  [  ]   Home w/ PT  [  ]  Rehab  [x  ]  Discussed with Cardiothoracic Team at AM rounds.

## 2017-09-29 NOTE — PROGRESS NOTE ADULT - ASSESSMENT
61 yo male with no PMHx presented with sob and abdominal pain  on 8/25/17.  Cta - for PE,  cardiac mRI and cath revealed   lv dysfunction and MR.  Cardiology was consulted, HF service consulted and the pt was placed in ccu with pulmonary edema. He had  low cardiac output and cardiogenic shock, and was  referred  for LVAD evaluation. ID was consulted to clear the patient for surgery.. GI was following for abd pain, no significant findings on virtual colonoscopy. He was treated for chf  with  afterload reduction  and milrinone and stabilized, euvolemic .  9/12/17 S/P Implantation of HeartMate II left ventricular assist device through median sternotomy, and repair of the tricuspid valve with a number 28 classic Christianson ring.  Post op he required inotropic support and PRBc's for acute blood loss anemia  He was transferred to sdu  9/18  He has c/o pain at the incision site and chest tube site  Chest tube to be d/c  9/20  Pain improved .  9/21 INR drifted to 1.8, Heparin started.  Lisinopril increased for BP and afterload  Leukocytosis, afebrile  Hyponatremia, off lasix  9/23 Leukocytosis WBC 19  CT chest/ab/plevis ordered  blood cx x2  ID consulted fluid bolus .9  per Dr Iyer low sodium, diet d/c regular diet ordered INR 2.08 heparin gtt d/c coumadin 5 tonight  9/24 cta done 2nd limited study prior.  Abdominal discomfort relieved with pain meds, d/w pain service, oxycontin recommended .  Ct scan with contrast reviewed by Dr Cadet.  Pt with loculated LLL effusion vs atelectasis, wbc improving but remains on antibiotics.  The coumadin was low dosed , awaiting IR tap of the loculated effusion and cultures to be sent.  Low map, ivf and decrease lisinopril  The IR procedure was cancelled by IR yesterday and he is still awaiting the thoracentisis.  The INR was below 2.0, heparin started.  9/27 pigtail placed in IR>300cc  with cultures neg to date.  F/u cultures, chest tube to water seal today , possible d/c later today.

## 2017-09-29 NOTE — CHART NOTE - NSCHARTNOTEFT_GEN_A_CORE
Chart reviewed, pt with chronic systolic heart failure, s/p LVAD, NICM, cardiac cachexia, hyponatremia.     Pt seen in bed, complaining of pain- NP aware. Pt reports good appetite and eating >75% of meals. Pt refused remainder of interview/education and requested RD to follow up at later date.    Source: Patient [ x ]    Family [ ]     other [ x ] RN, NP, Comprehensive chart review     Diet : Regular, Ensure Enlive x3    Admission Weight: 66.1kg  Current Weight: 57.7kg  Weight fluctuations noted, suspect related to fluid shifts.     Pertinent Medications: MEDICATIONS  (STANDING):  aspirin enteric coated 81 milliGRAM(s) Oral daily  docusate sodium 100 milliGRAM(s) Oral three times a day  polyethylene glycol 3350 17 Gram(s) Oral daily  pantoprazole    Tablet 40 milliGRAM(s) Oral before breakfast  lactobacillus acidophilus and bulgaricus Chewable 1 Tablet(s) Chew three times a day  vancomycin  IVPB 1250 milliGRAM(s) IV Intermittent every 12 hours  vancomycin  IVPB      piperacillin/tazobactam IVPB. 3.375 Gram(s) IV Intermittent every 8 hours  sorbitol 70% Solution 30 milliLiter(s) Oral once  senna 2 Tablet(s) Oral at bedtime  oxyCODONE  ER Tablet 10 milliGRAM(s) Oral every 12 hours  lisinopril 5 milliGRAM(s) Oral daily  acetaminophen   Tablet. 975 milliGRAM(s) Oral every 8 hours  heparin  Infusion. 1200 Unit(s)/Hr (12 mL/Hr) IV Continuous <Continuous>    MEDICATIONS  (PRN):  bisacodyl Suppository 10 milliGRAM(s) Rectal daily PRN Constipation  heparin  Injectable 4500 Unit(s) IV Push every 6 hours PRN For aPTT less than 40  heparin  Injectable 2000 Unit(s) IV Push every 6 hours PRN For aPTT between 40 - 57    Pertinent Labs:    Hemoglobin: 9.8 g/dL (09.29.17 @ 02:03) - low  Hematocrit: 29.6 % (09.29.17 @ 02:03) - low  Basic Metabolic Panel (09.29.17 @ 02:03)    Sodium, Serum: 125: TEST REPEATED. mmol/L - low    Potassium, Serum: 4.2 mmol/L    Chloride, Serum: 89 mmol/L - low    Carbon Dioxide, Serum: 24 mmol/L    Anion Gap, Serum: 12 mmol/L    Blood Urea Nitrogen, Serum: 12 mg/dL    Creatinine, Serum: 0.60 mg/dL    Glucose, Serum: 174 mg/dL - high    Calcium, Total Serum: 9.1 mg/dL  Lactate Dehydrogenase, Serum: 384 U/L (09.29.17 @ 02:03) - high   Comprehensive Metabolic Panel (09.28.17 @ 01:49)    Protein Total, Serum: 6.9 g/dL    Albumin, Serum: 2.9 g/dL - low    Bilirubin Total, Serum: 0.4 mg/dL    Alkaline Phosphatase, Serum: 122 U/L - high    Aspartate Aminotransferase (AST/SGOT): 27 U/L    Alanine Aminotransferase (ALT/SGPT): 33 U/L RC    Skin: no pressure ulcers noted.     Estimated Needs:   [ x ] no change since previous assessment  [ ] recalculated:       Previous Nutrition Diagnosis:   Increased Nutrient Needs and Malnutrition being addressed with PO diet and supplements.   Food & Nutrition Related Knowledge Deficit remains, RD remains available for diet education when pt is ready.     Nutrition Diagnosis is [ x ] ongoing  [ ] resolved [ ] not applicable     New Nutrition Diagnosis: [ x ] not applicable    Interventions:   1. Encourage PO intake with all meals.  2. Provide food preferences within therapeutic diet when requested.  3. Reviewed alternate menu options and menu ordering procedure.   4. Encourage consumption of oral nutritional supplements.   5. Pt to follow up with diet education when pt is ready.        Monitoring and Evaluation:     [ x ] PO intake [ x ] Tolerance to diet prescription [ x ] weights [ x ] follow up per protocol    [ ] other:

## 2017-09-29 NOTE — PROGRESS NOTE ADULT - PROBLEM SELECTOR PLAN 6
PRN pain medication on top of long acting oxycontin  re checking INR if ok to IR to evaluate L sided effusion

## 2017-09-29 NOTE — PROGRESS NOTE ADULT - ASSESSMENT
Mr. Quispe is a 62 year old man with a nonischemic cardiomyopathy with ACC/AHA stage D congestive heart failure who presented with abdominal pain due to cardiogenic shock, now s/p Heart Mate II LVAD with TV annuloplasty ring on 9/12. His postoperative course was complicated by bleeding requiring transfusions, which has stabilitzed. He currently is euvolemic without evidence of right heart failure. His INR is slightly subtherapeutic and his MAPs are at goal. Pt is s/p thoracentesis and chest tube placement 9/27 presenting now  with reports of fatigue and discomfort at chest tube site initial drainage 800 cc serosang with chest tube drainage 45 cc overnight , Maps stable today Mr. Quispe is a 62 year old man with a nonischemic cardiomyopathy with ACC/AHA stage D congestive heart failure who presented with abdominal pain due to cardiogenic shock, now s/p Heart Mate II LVAD with TV annuloplasty ring on 9/12. His postoperative course was complicated by bleeding requiring transfusions, which has stabilitzed. He currently is euvolemic without evidence of right heart failure. His INR is slightly subtherapeutic and his MAPs are at goal. Pt is s/p thoracentesis and chest tube placement 9/27  . Draining 35 cc overnight  WBC improving, afebrile , MAPs 70 -80 ,  LDH stable @ 384 pleural fluid cultures pending

## 2017-09-29 NOTE — PROGRESS NOTE ADULT - SUBJECTIVE AND OBJECTIVE BOX
INFECTIOUS DISEASES FOLLOW UP-- Anitra Prater  [phone]    This is a follow up note for this  62yMale with  Other pulmonary embolism without acute cor pulmonale s/p LVAD placement on 9/12 with couse complicated by an exudative left pleural effusion with negative cultures. CT tube removed today. Patient withdrawn, seems depressed.      ROS:  CONSTITUTIONAL:  No fever, poor appetite  CARDIOVASCULAR:  No chest pain or palpitations  RESPIRATORY:  No dyspnea but pain generalized  GASTROINTESTINAL:  No nausea, vomiting, diarrhea, c/o lower  abdominal pain  GENITOURINARY:  No dysuria  NEUROLOGIC:  No headache,     Allergies    No Known Allergies    Intolerances        ANTIBIOTICS/RELEVANT:  antimicrobials    immunologic:    OTHER:  aspirin enteric coated 81 milliGRAM(s) Oral daily  docusate sodium 100 milliGRAM(s) Oral three times a day  polyethylene glycol 3350 17 Gram(s) Oral daily  pantoprazole    Tablet 40 milliGRAM(s) Oral before breakfast  bisacodyl Suppository 10 milliGRAM(s) Rectal daily PRN  lactobacillus acidophilus and bulgaricus Chewable 1 Tablet(s) Chew three times a day  sorbitol 70% Solution 30 milliLiter(s) Oral once  senna 2 Tablet(s) Oral at bedtime  oxyCODONE  ER Tablet 10 milliGRAM(s) Oral every 12 hours  lisinopril 5 milliGRAM(s) Oral daily  acetaminophen   Tablet. 975 milliGRAM(s) Oral every 8 hours  heparin  Infusion. 1200 Unit(s)/Hr IV Continuous <Continuous>  heparin  Injectable 4500 Unit(s) IV Push every 6 hours PRN  heparin  Injectable 2000 Unit(s) IV Push every 6 hours PRN  oxyCODONE    IR 5 milliGRAM(s) Oral every 6 hours PRN  warfarin 6 milliGRAM(s) Oral once  lidocaine   Patch 1 Patch Transdermal daily      Objective:  Vital Signs Last 24 Hrs  T(C): 36.6 (29 Sep 2017 17:00), Max: 36.9 (28 Sep 2017 20:00)  T(F): 97.8 (29 Sep 2017 17:00), Max: 98.5 (29 Sep 2017 08:00)  HR: 88 (29 Sep 2017 17:00) (83 - 92)  BP: --  BP(mean): 76 (29 Sep 2017 17:00) (76 - 84)  RR: 18 (29 Sep 2017 17:00) (18 - 18)  SpO2: 100% (29 Sep 2017 17:00) (99% - 100%)    PHYSICAL EXAM:  Constitutional:no acute distress  Eyes:ALONSO, EOMI  Ear/Nose/Throat: no oral lesions, 	  Respiratory: clear BL  Cardiovascular: S1S2  Gastrointestinal:soft, (+) BS, no tenderness  Extremities:no e/e/c  No Lymphadenopathy  IV sites not inflammed.    LABS:                        9.8    12.8  )-----------( 451      ( 29 Sep 2017 02:03 )             29.6     09-29    125<L>  |  89<L>  |  12  ----------------------------<  174<H>  4.2   |  24  |  0.60    Ca    9.1      29 Sep 2017 02:03    TPro  6.9  /  Alb  2.9<L>  /  TBili  0.4  /  DBili  x   /  AST  27  /  ALT  33  /  AlkPhos  122<H>  09-28    PT/INR - ( 29 Sep 2017 09:17 )   PT: 18.1 sec;   INR: 1.66 ratio         PTT - ( 29 Sep 2017 09:17 )  PTT:74.4 sec      MICROBIOLOGY:    Culture - Fungal, Body Fluid (09.28.17 @ 00:32)    Specimen Source: .Body Fluid Pleural Fluid    Culture Results:   Testing in progress    Culture - Body Fluid with Gram Stain (09.28.17 @ 00:32)    Gram Stain:   polymorphonuclear leukocytes seen  No organisms seen  by cytocentrifuge    Specimen Source: .Body Fluid Pleural Fluid    Culture Results:   No growth to date            RECENT CULTURES:      RADIOLOGY & ADDITIONAL STUDIES:    < from: Xray Chest 1 View AP- PORTABLE-Urgent (09.29.17 @ 13:47) >    IMPRESSION:    1.  Clear lungs.  2.  Left costophrenic pigtail catheter in place.    < end of copied text >

## 2017-09-29 NOTE — PROGRESS NOTE ADULT - ASSESSMENT
61 yo man s/p LVAd placement 9/12/17 with leukocytosis and left sided pain. Unclear if his pain and leukocytosis are related to the left sided effusion. Given the persistence is both would favor a diagnostic thoracentesis with IR guidance to r/o infection source    Continue the Vancomycin and Zosyn pending final cultures results    Pleural fluid  is exudative and was sent for gram stain, culture, fungal stain, AFB stain/cx.,  all of which are negative to date. CT tube was removed earlier today    Day 7 of Vanco/zosyn without  a definitive source- will discontinue antibiotics    Observe off antibiotics      Call [phone] over the weekend if issues arise

## 2017-09-29 NOTE — PROGRESS NOTE ADULT - SUBJECTIVE AND OBJECTIVE BOX
Called to see pt for left chest tube removal. Pt s/p chest tube placement on 9/27 for pleural effusion culture is negative. Output is 35cc. chest x ray was reviewed with Dr. Williamson. Heprain drip on hold for 1 hour. pt denies SOB. Chest tube removed under sterile condition. Minimal bleeding noted. Occlusive dressing applied. May restart heparin in 1 hour. d/w Dr. williamson and CT NP Tomas

## 2017-09-29 NOTE — PROGRESS NOTE ADULT - PROBLEM SELECTOR PLAN 1
The left side pig tail catheter is placed: dark bloody fluid removed: Has significantly high LDH but ph is OK: Exudative fluid: there are no organisms growing in the fluid.  Patient is on broad spectrum antibiotics : ID is following  09/29/2017: exudative effusion ? all blood? negative cultures to date!!

## 2017-09-29 NOTE — PROGRESS NOTE ADULT - SUBJECTIVE AND OBJECTIVE BOX
Subjective:    Medications:  aspirin enteric coated 81 milliGRAM(s) Oral daily  docusate sodium 100 milliGRAM(s) Oral three times a day  polyethylene glycol 3350 17 Gram(s) Oral daily  pantoprazole    Tablet 40 milliGRAM(s) Oral before breakfast  bisacodyl Suppository 10 milliGRAM(s) Rectal daily PRN  lactobacillus acidophilus and bulgaricus Chewable 1 Tablet(s) Chew three times a day  vancomycin  IVPB 1250 milliGRAM(s) IV Intermittent every 12 hours  piperacillin/tazobactam IVPB. 3.375 Gram(s) IV Intermittent every 8 hours  sorbitol 70% Solution 30 milliLiter(s) Oral once  senna 2 Tablet(s) Oral at bedtime  oxyCODONE  ER Tablet 10 milliGRAM(s) Oral every 12 hours  lisinopril 5 milliGRAM(s) Oral daily      PHYSICAL EXAM:  Vital Signs Last 24 Hrs  T(C): 36.8 (29 Sep 2017 04:00), Max: 36.9 (28 Sep 2017 20:00)  T(F): 98.2 (29 Sep 2017 04:00), Max: 98.4 (28 Sep 2017 20:00)  HR: 83 (29 Sep 2017 08:00) (83 - 92)  BP(mean): 80 (29 Sep 2017 04:00) (75 - 84)  RR: 18 (29 Sep 2017 08:00) (18 - 18)  SpO2: 100% (29 Sep 2017 08:00) (99% - 100%)  Daily Weight in k.7 (28 Sep 2017 08:38)      28 Sep 2017 07:01  -  29 Sep 2017 07:00  --------------------------------------------------------  IN:    heparin  Infusion.: 144 mL    heparin Infusion: 150 mL    Oral Fluid: 1130 mL    Solution: 900 mL  Total IN: 2324 mL    OUT:    Chest Tube: 35 mL    Voided: 1950 mL  Total OUT: 1985 mL    Total NET: 339 mL      29 Sep 2017 07:01  -  29 Sep 2017 08:06  --------------------------------------------------------  IN:  Total IN: 0 mL    OUT:    Voided: 300 mL  Total OUT: 300 mL    Total NET: -300 mL          General: A/ox 3, No acute Distress  Neck: Supple, NO JVD  Cardiac: S1 S2, LVAD sounds  Pulmonary: CTAB, Breathing unlabored, No Rhonchi/Rales/Wheezing  Abdomen: Soft, Non -tender, +BS   Extremities: No Rashes, No edema  Neuro: A/o x 3, No focal deficits  Psch: normal mood , normal affect                         9.8    12.8  )-----------( 451      ( 29 Sep 2017 02:03 )             29.6     09-    125<L>  |  89<L>  |  12  ----------------------------<  174<H>  4.2   |  24  |  0.60    Ca    9.1      29 Sep 2017 02:03    TPro  6.9  /  Alb  2.9<L>  /  TBili  0.4  /  DBili  x   /  AST  27  /  ALT  33  /  AlkPhos  122<H>  09-    PT/INR - ( 28 Sep 2017 09:41 )   PT: 17.3 sec;   INR: 1.57 ratio         PTT - ( 29 Sep 2017 02:03 )  PTT:75.8 sec     Subjective: Pt resting NAD   No PI events  No JVD     Medications:  aspirin enteric coated 81 milliGRAM(s) Oral daily  docusate sodium 100 milliGRAM(s) Oral three times a day  polyethylene glycol 3350 17 Gram(s) Oral daily  pantoprazole    Tablet 40 milliGRAM(s) Oral before breakfast  bisacodyl Suppository 10 milliGRAM(s) Rectal daily PRN  lactobacillus acidophilus and bulgaricus Chewable 1 Tablet(s) Chew three times a day  vancomycin  IVPB 1250 milliGRAM(s) IV Intermittent every 12 hours  piperacillin/tazobactam IVPB. 3.375 Gram(s) IV Intermittent every 8 hours  sorbitol 70% Solution 30 milliLiter(s) Oral once  senna 2 Tablet(s) Oral at bedtime  oxyCODONE  ER Tablet 10 milliGRAM(s) Oral every 12 hours  lisinopril 5 milliGRAM(s) Oral daily      PHYSICAL EXAM:  Vital Signs Last 24 Hrs  T(C): 36.8 (29 Sep 2017 04:00), Max: 36.9 (28 Sep 2017 20:00)  T(F): 98.2 (29 Sep 2017 04:00), Max: 98.4 (28 Sep 2017 20:00)  HR: 83 (29 Sep 2017 08:00) (83 - 92)  BP(mean): 80 (29 Sep 2017 04:00) (75 - 84)  RR: 18 (29 Sep 2017 08:00) (18 - 18)  SpO2: 100% (29 Sep 2017 08:00) (99% - 100%)  Daily Weight in k.7 (28 Sep 2017 08:38)      28 Sep 2017 07:01  -  29 Sep 2017 07:00  --------------------------------------------------------  IN:    heparin  Infusion.: 144 mL    heparin Infusion: 150 mL    Oral Fluid: 1130 mL    Solution: 900 mL  Total IN: 2324 mL    OUT:    Chest Tube: 35 mL    Voided: 1950 mL  Total OUT: 1985 mL    Total NET: 339 mL      29 Sep 2017 07:  -  29 Sep 2017 08:06  --------------------------------------------------------  IN:  Total IN: 0 mL    OUT:    Voided: 300 mL  Total OUT: 300 mL    Total NET: -300 mL          General: A/ox 3, No acute Distress  Neck: Supple, NO JVD  Cardiac: S1 S2, LVAD sounds  Pulmonary: CTAB, Breathing unlabored, No Rhonchi/Rales/Wheezing  L Chest tube - drained 35cc   Abdomen: Soft, Non -tender, +BS   Extremities: No Rashes, No edema  Neuro: A/o x 3, No focal deficits  Psch: normal mood , normal affect                         9.8    12.8  )-----------( 451      ( 29 Sep 2017 02:03 )             29.6     -    125<L>  |  89<L>  |  12  ----------------------------<  174<H>  4.2   |  24  |  0.60    Ca    9.1      29 Sep 2017 02:03    TPro  6.9  /  Alb  2.9<L>  /  TBili  0.4  /  DBili  x   /  AST  27  /  ALT  33  /  AlkPhos  122<H>      PT/INR - ( 28 Sep 2017 09:41 )   PT: 17.3 sec;   INR: 1.57 ratio         PTT - ( 29 Sep 2017 02:03 )  PTT:75.8 sec

## 2017-09-29 NOTE — PROGRESS NOTE ADULT - PROBLEM SELECTOR PLAN 2
stable: His CT scan is reviewed: small loculated effusion: doubt any infection, for possible diagnostic tap today, to rule out any infection: He had small pleural effusion on the left side on 8/25 ct scan chest : This seems to be simple loculated effusion ( secondary to chronicity): The etiology of abdominal pain Is not clear: , though it is chronic!!  8/27/2017: for tap today  s/p tap yesterday: await cultures/: exudative fluid!!  8/29: as above   today's chest xray with some haziness at left base: chest tube in place

## 2017-09-29 NOTE — PROGRESS NOTE ADULT - ATTENDING COMMENTS
Chest tube to be removed tomorrow.  WBC trending down.  Dose Coumadin; INR goal 2-3.  Lidoderm patch to be placed.  Aim for d/c to rehab next week.  Na 125; likely euvolemic. Free water restriction.

## 2017-09-30 LAB
ANION GAP SERPL CALC-SCNC: 10 MMOL/L — SIGNIFICANT CHANGE UP (ref 5–17)
APTT BLD: 87 SEC — HIGH (ref 27.5–37.4)
BASOPHILS # BLD AUTO: 0 K/UL — SIGNIFICANT CHANGE UP (ref 0–0.2)
BASOPHILS NFR BLD AUTO: 0.3 % — SIGNIFICANT CHANGE UP (ref 0–2)
BUN SERPL-MCNC: 13 MG/DL — SIGNIFICANT CHANGE UP (ref 7–23)
CALCIUM SERPL-MCNC: 9.4 MG/DL — SIGNIFICANT CHANGE UP (ref 8.4–10.5)
CHLORIDE SERPL-SCNC: 92 MMOL/L — LOW (ref 96–108)
CO2 SERPL-SCNC: 26 MMOL/L — SIGNIFICANT CHANGE UP (ref 22–31)
CREAT SERPL-MCNC: 0.64 MG/DL — SIGNIFICANT CHANGE UP (ref 0.5–1.3)
EOSINOPHIL # BLD AUTO: 0.4 K/UL — SIGNIFICANT CHANGE UP (ref 0–0.5)
EOSINOPHIL NFR BLD AUTO: 3.8 % — SIGNIFICANT CHANGE UP (ref 0–6)
GLUCOSE SERPL-MCNC: 115 MG/DL — HIGH (ref 70–99)
HCT VFR BLD CALC: 27.6 % — LOW (ref 39–50)
HGB BLD-MCNC: 9.4 G/DL — LOW (ref 13–17)
INR BLD: 1.85 RATIO — HIGH (ref 0.88–1.16)
LYMPHOCYTES # BLD AUTO: 1.1 K/UL — SIGNIFICANT CHANGE UP (ref 1–3.3)
LYMPHOCYTES # BLD AUTO: 9.6 % — LOW (ref 13–44)
MCHC RBC-ENTMCNC: 33.1 PG — SIGNIFICANT CHANGE UP (ref 27–34)
MCHC RBC-ENTMCNC: 34.1 GM/DL — SIGNIFICANT CHANGE UP (ref 32–36)
MCV RBC AUTO: 97.1 FL — SIGNIFICANT CHANGE UP (ref 80–100)
MONOCYTES # BLD AUTO: 0.8 K/UL — SIGNIFICANT CHANGE UP (ref 0–0.9)
MONOCYTES NFR BLD AUTO: 7.4 % — SIGNIFICANT CHANGE UP (ref 2–14)
NEUTROPHILS # BLD AUTO: 9 K/UL — HIGH (ref 1.8–7.4)
NEUTROPHILS NFR BLD AUTO: 78.9 % — HIGH (ref 43–77)
PLATELET # BLD AUTO: 397 K/UL — SIGNIFICANT CHANGE UP (ref 150–400)
POTASSIUM SERPL-MCNC: 5 MMOL/L — SIGNIFICANT CHANGE UP (ref 3.5–5.3)
POTASSIUM SERPL-SCNC: 5 MMOL/L — SIGNIFICANT CHANGE UP (ref 3.5–5.3)
PROTHROM AB SERPL-ACNC: 20.4 SEC — HIGH (ref 9.8–12.7)
RBC # BLD: 2.84 M/UL — LOW (ref 4.2–5.8)
RBC # FLD: 13.3 % — SIGNIFICANT CHANGE UP (ref 10.3–14.5)
SODIUM SERPL-SCNC: 128 MMOL/L — LOW (ref 135–145)
WBC # BLD: 11.4 K/UL — HIGH (ref 3.8–10.5)
WBC # FLD AUTO: 11.4 K/UL — HIGH (ref 3.8–10.5)

## 2017-09-30 PROCEDURE — 99233 SBSQ HOSP IP/OBS HIGH 50: CPT | Mod: GC

## 2017-09-30 RX ORDER — WARFARIN SODIUM 2.5 MG/1
6 TABLET ORAL ONCE
Qty: 0 | Refills: 0 | Status: COMPLETED | OUTPATIENT
Start: 2017-09-30 | End: 2017-09-30

## 2017-09-30 RX ADMIN — Medication 100 MILLIGRAM(S): at 06:37

## 2017-09-30 RX ADMIN — HEPARIN SODIUM 1200 UNIT(S)/HR: 5000 INJECTION INTRAVENOUS; SUBCUTANEOUS at 06:44

## 2017-09-30 RX ADMIN — Medication 975 MILLIGRAM(S): at 06:37

## 2017-09-30 RX ADMIN — Medication 975 MILLIGRAM(S): at 14:55

## 2017-09-30 RX ADMIN — Medication 81 MILLIGRAM(S): at 12:18

## 2017-09-30 RX ADMIN — Medication 975 MILLIGRAM(S): at 14:25

## 2017-09-30 RX ADMIN — OXYCODONE HYDROCHLORIDE 5 MILLIGRAM(S): 5 TABLET ORAL at 02:08

## 2017-09-30 RX ADMIN — OXYCODONE HYDROCHLORIDE 10 MILLIGRAM(S): 5 TABLET ORAL at 16:58

## 2017-09-30 RX ADMIN — OXYCODONE HYDROCHLORIDE 10 MILLIGRAM(S): 5 TABLET ORAL at 08:06

## 2017-09-30 RX ADMIN — Medication 975 MILLIGRAM(S): at 06:50

## 2017-09-30 RX ADMIN — LIDOCAINE 1 PATCH: 4 CREAM TOPICAL at 06:35

## 2017-09-30 RX ADMIN — Medication 975 MILLIGRAM(S): at 22:51

## 2017-09-30 RX ADMIN — LACTOBACILLUS ACIDOPH-L.BULGARICUS 1 MILLION CELL CHEWABLE TABLET 1 TABLET(S): at 14:25

## 2017-09-30 RX ADMIN — OXYCODONE HYDROCHLORIDE 10 MILLIGRAM(S): 5 TABLET ORAL at 17:30

## 2017-09-30 RX ADMIN — OXYCODONE HYDROCHLORIDE 10 MILLIGRAM(S): 5 TABLET ORAL at 08:30

## 2017-09-30 RX ADMIN — WARFARIN SODIUM 6 MILLIGRAM(S): 2.5 TABLET ORAL at 21:51

## 2017-09-30 RX ADMIN — LISINOPRIL 5 MILLIGRAM(S): 2.5 TABLET ORAL at 06:37

## 2017-09-30 RX ADMIN — PANTOPRAZOLE SODIUM 40 MILLIGRAM(S): 20 TABLET, DELAYED RELEASE ORAL at 06:38

## 2017-09-30 RX ADMIN — LACTOBACILLUS ACIDOPH-L.BULGARICUS 1 MILLION CELL CHEWABLE TABLET 1 TABLET(S): at 06:37

## 2017-09-30 RX ADMIN — LACTOBACILLUS ACIDOPH-L.BULGARICUS 1 MILLION CELL CHEWABLE TABLET 1 TABLET(S): at 21:51

## 2017-09-30 RX ADMIN — OXYCODONE HYDROCHLORIDE 5 MILLIGRAM(S): 5 TABLET ORAL at 01:38

## 2017-09-30 RX ADMIN — Medication 975 MILLIGRAM(S): at 21:51

## 2017-09-30 NOTE — PROGRESS NOTE ADULT - PROBLEM SELECTOR PLAN 2
stable: His CT scan is reviewed: small loculated effusion: doubt any infection, for possible diagnostic tap today, to rule out any infection: He had small pleural effusion on the left side on 8/25 ct scan chest : This seems to be simple loculated effusion ( secondary to chronicity): The etiology of abdominal pain Is not clear: , though it is chronic!!  8/27/2017: for tap today  s/p tap yesterday: await cultures/: exudative fluid!!  9/29: as above   today's chest xray with some haziness at left base: chest tube in place  9/30: yesterdays chest x-ray reported as clear: chest tube removed: pt is stable from pulmonary perspective.

## 2017-09-30 NOTE — PROGRESS NOTE ADULT - ASSESSMENT
63 yo male with no PMHx presented with sob and abdominal pain  on 8/25/17.  Cta - for PE,  cardiac mRI and cath revealed   lv dysfunction and MR.  Cardiology was consulted, HF service consulted and the pt was placed in ccu with pulmonary edema. He had  low cardiac output and cardiogenic shock, and was  referred  for LVAD evaluation. ID was consulted to clear the patient for surgery.. GI was following for abd pain, no significant findings on virtual colonoscopy. He was treated for chf  with  afterload reduction  and milrinone and stabilized, euvolemic .  9/12/17 S/P Implantation of HeartMate II left ventricular assist device through median sternotomy, and repair of the tricuspid valve with a number 28 classic Christianson ring.  Post op he required inotropic support and PRBc's for acute blood loss anemia  He was transferred to [facility]  9/18  He has c/o pain at the incision site and chest tube site  Chest tube to be d/c  9/20  Pain improved .  9/21 INR drifted to 1.8, Heparin started.  Lisinopril increased for BP and afterload  Leukocytosis, afebrile  Hyponatremia, off lasix  9/23 Leukocytosis WBC 19  CT chest/ab/plevis ordered  blood cx x2  ID consulted fluid bolus .9  per Dr Iyer low sodium, diet d/c regular diet ordered INR 2.08 heparin gtt d/c coumadin 5 tonight  9/24 cta done 2nd limited study prior.  Abdominal discomfort relieved with pain meds, d/w pain service, oxycontin recommended .  Ct scan with contrast reviewed by Dr Cadet.  Pt with loculated LLL effusion vs atelectasis, wbc improving but remains on antibiotics.  The coumadin was low dosed , awaiting IR tap of the loculated effusion and cultures to be sent.  Low map, ivf and decrease lisinopril  The IR procedure was cancelled by IR yesterday and he is still awaiting the thoracentisis.  The INR was below 2.0, heparin started.  9/27 pigtail placed in IR>300cc  with cultures neg to date.  F/u cultures, chest tube to water seal today , possible d/c later today.  9/30 Heparin Gtt coumadin dosed daily ambulated today

## 2017-09-30 NOTE — PROGRESS NOTE ADULT - SUBJECTIVE AND OBJECTIVE BOX
Patient is a 62y old  Male who presents with a chief complaint of SOOB (25 Aug 2017 22:27)    " I am having abdominal pain"  denies any SOB   the chest tube is taken out   Any change in ROS:     MEDICATIONS  (STANDING):  aspirin enteric coated 81 milliGRAM(s) Oral daily  docusate sodium 100 milliGRAM(s) Oral three times a day  polyethylene glycol 3350 17 Gram(s) Oral daily  pantoprazole    Tablet 40 milliGRAM(s) Oral before breakfast  lactobacillus acidophilus and bulgaricus Chewable 1 Tablet(s) Chew three times a day  sorbitol 70% Solution 30 milliLiter(s) Oral once  senna 2 Tablet(s) Oral at bedtime  oxyCODONE  ER Tablet 10 milliGRAM(s) Oral every 12 hours  lisinopril 5 milliGRAM(s) Oral daily  acetaminophen   Tablet. 975 milliGRAM(s) Oral every 8 hours  heparin  Infusion. 1200 Unit(s)/Hr (12 mL/Hr) IV Continuous <Continuous>  lidocaine   Patch 1 Patch Transdermal daily    MEDICATIONS  (PRN):  bisacodyl Suppository 10 milliGRAM(s) Rectal daily PRN Constipation  heparin  Injectable 4500 Unit(s) IV Push every 6 hours PRN For aPTT less than 40  heparin  Injectable 2000 Unit(s) IV Push every 6 hours PRN For aPTT between 40 - 57  oxyCODONE    IR 5 milliGRAM(s) Oral every 6 hours PRN Severe Pain (7 - 10), breakthrough    Vital Signs Last 24 Hrs  T(C): 36.8 (30 Sep 2017 10:00), Max: 36.8 (29 Sep 2017 20:00)  T(F): 98.2 (30 Sep 2017 10:00), Max: 98.2 (29 Sep 2017 20:00)  HR: 92 (30 Sep 2017 10:00) (84 - 97)  BP: --  BP(mean): 70 (30 Sep 2017 10:00) (70 - 84)  RR: 16 (30 Sep 2017 10:00) (16 - 18)  SpO2: 98% (30 Sep 2017 10:00) (98% - 100%)    I&O's Summary    29 Sep 2017 07:01  -  30 Sep 2017 07:00  --------------------------------------------------------  IN: 1054 mL / OUT: 1900 mL / NET: -846 mL          Physical Exam:   GENERAL: cachectic  HEENT: ALONSO/   Atraumatic, Normocephalic  ENMT: No tonsillar erythema, exudates, or enlargement; Moist mucous membranes, Good dentition, No lesions  NECK: Supple, No JVD, Normal thyroid  CHEST/LUNG: Clear to auscultaion  CVS: Regular rate and rhythm; No murmurs, rubs, or gallops  GI: : Soft, Nontender, Nondistended; Bowel sounds present  NERVOUS SYSTEM:  Alert & Oriented X3  EXTREMITIES:  2+ Peripheral Pulses, No clubbing, cyanosis, or edema  LYMPH: No lymphadenopathy noted  SKIN: No rashes or lesions  ENDOCRINOLOGY: No Thyromegaly  PSYCH: Appropriate    Labs:                              9.4    11.4  )-----------( 397      ( 30 Sep 2017 04:58 )             27.6                         9.8    12.8  )-----------( 451      ( 29 Sep 2017 02:03 )             29.6                         8.8    13.3  )-----------( 467      ( 28 Sep 2017 01:48 )             26.0                         9.6    16.3  )-----------( 476      ( 27 Sep 2017 04:06 )             28.7     09-30    128<L>  |  92<L>  |  13  ----------------------------<  115<H>  5.0   |  26  |  0.64  09-29    125<L>  |  89<L>  |  12  ----------------------------<  174<H>  4.2   |  24  |  0.60  09-28    124<L>  |  88<L>  |  16  ----------------------------<  116<H>  4.5   |  24  |  0.56  09-27    127<L>  |  88<L>  |  18  ----------------------------<  195<H>  4.5   |  25  |  0.66    Ca    9.4      30 Sep 2017 04:58  Ca    9.1      29 Sep 2017 02:03    TPro  6.9  /  Alb  2.9<L>  /  TBili  0.4  /  DBili  x   /  AST  27  /  ALT  33  /  AlkPhos  122<H>  09-28    CAPILLARY BLOOD GLUCOSE            PT/INR - ( 29 Sep 2017 09:17 )   PT: 18.1 sec;   INR: 1.66 ratio         PTT - ( 30 Sep 2017 04:58 )  PTT:87.0 sec    Fluid Source --  Albumin, Fluid2.3 g/dL  Glucose, Fluid98 mg/dL  Protein total, Fluid4.4 g/dL  Lacatate Dehydrogenase, [id number] U/L  pH, Fluid7.80  Cytopathology-Non Gyn Report--  Cultures:     < from: Xray Chest 1 View AP- PORTABLE-Urgent (09.29.17 @ 13:47) >  es and Tubes: Left costophrenic pigtail catheter in place.  Median sternotomy wires and surgical clips, status post tricuspid valve   annuloplasty ring.      Lungs: The lungs are clear.      Pleura: No pleural effusion or pneumothorax.      Heart and Mediastinum: Heart size cannot be adequately assessed in this   projection. Left ventricular assist device.      Skeletal: Unremarkable.      IMPRESSION:    1.  Clear lungs.  2.  Left costophrenic pigtail catheter in place.                MENDEZ TENA M.D., RADIOLOGY RESIDENT  This document has been electronically signed.  PAULINO GREGORY M.D., ATTENDING RADIOLOGIST  This document has been electronically signed. Sep 29 2017  3:27PM    < end of copied text >                          Studies  Chest X-RAY  CT SCAN Chest   Venous Dopplers: LE:   CT Abdomen  Others

## 2017-09-30 NOTE — PROGRESS NOTE ADULT - SUBJECTIVE AND OBJECTIVE BOX
24H hour events: No acute events in last 24 hours. Patient seen in bed, states feels well, improved since admission.     MEDICATIONS:  aspirin enteric coated 81 milliGRAM(s) Oral daily  lisinopril 5 milliGRAM(s) Oral daily  heparin  Infusion. 1200 Unit(s)/Hr IV Continuous <Continuous>  heparin  Injectable 4500 Unit(s) IV Push every 6 hours PRN  heparin  Injectable 2000 Unit(s) IV Push every 6 hours PRN        oxyCODONE  ER Tablet 10 milliGRAM(s) Oral every 12 hours  acetaminophen   Tablet. 975 milliGRAM(s) Oral every 8 hours  oxyCODONE    IR 5 milliGRAM(s) Oral every 6 hours PRN    docusate sodium 100 milliGRAM(s) Oral three times a day  polyethylene glycol 3350 17 Gram(s) Oral daily  pantoprazole    Tablet 40 milliGRAM(s) Oral before breakfast  bisacodyl Suppository 10 milliGRAM(s) Rectal daily PRN  sorbitol 70% Solution 30 milliLiter(s) Oral once  senna 2 Tablet(s) Oral at bedtime      lidocaine   Patch 1 Patch Transdermal daily        PHYSICAL EXAM:  T(C): 36.8 (09-30-17 @ 05:00), Max: 36.8 (09-29-17 @ 20:00)  HR: 87 (09-30-17 @ 05:00) (84 - 97)  BP: --  RR: 18 (09-30-17 @ 05:00) (18 - 18)  SpO2: 100% (09-30-17 @ 05:00) (100% - 100%)  Wt(kg): --  I&O's Summary    29 Sep 2017 07:01  -  30 Sep 2017 07:00  --------------------------------------------------------  IN: 1054 mL / OUT: 1900 mL / NET: -846 mL        Appearance: Normal	  HEENT:   Normal oral mucosa  Lymphatic: No lymphadenopathy  Cardiovascular: Normal S1 S2,         No JVD,      No murmurs,      No edema  Respiratory: Lungs clear to auscultation	  Psychiatry: Mood & affect appropriate  Gastrointestinal:  Soft, Non-tender	  Skin: No rashes, No ecchymoses, No cyanosis	  Neurologic: Non-focal  Extremities: Normal range of motion, No clubbing, cyanosis   Vascular: warm extremities        LABS:	 	    CBC Full  -  ( 30 Sep 2017 04:58 )  WBC Count : 11.4 K/uL  Hemoglobin : 9.4 g/dL  Hematocrit : 27.6 %  Platelet Count - Automated : 397 K/uL  Mean Cell Volume : 97.1 fl  Mean Cell Hemoglobin : 33.1 pg  Mean Cell Hemoglobin Concentration : 34.1 gm/dL  Auto Neutrophil # : 9.0 K/uL  Auto Lymphocyte # : 1.1 K/uL  Auto Monocyte # : 0.8 K/uL  Auto Eosinophil # : 0.4 K/uL  Auto Basophil # : 0.0 K/uL  Auto Neutrophil % : 78.9 %  Auto Lymphocyte % : 9.6 %  Auto Monocyte % : 7.4 %  Auto Eosinophil % : 3.8 %  Auto Basophil % : 0.3 %    09-30    128<L>  |  92<L>  |  13  ----------------------------<  115<H>  5.0   |  26  |  0.64  09-29    125<L>  |  89<L>  |  12  ----------------------------<  174<H>  4.2   |  24  |  0.60    Ca    9.4      30 Sep 2017 04:58  Ca    9.1      29 Sep 2017 02:03        proBNP:     TELEMETRY: 	  VP80 m 5 beats wct

## 2017-09-30 NOTE — PROGRESS NOTE ADULT - SUBJECTIVE AND OBJECTIVE BOX
INTERVAL HPI/OVERNIGHT EVENTS:  HPI:  62M with no PMHx here with chest pain. Began last week, described as pleuritic, worsens with cough and deep inspiration. Has been having SOB as well, dry cough. Recently vacationed in [facility] came back today. Saw doctor in [facility] but unclear what workup was. No fever, sick contacts, abd pain. CP described as midsternal, nonradiating. Has 25 pack year smoking history.  CTPA does not reveal PE, Pulm congestion noted.  No new overnight event.  No N/V/D.  Tolerating diet.      MEDICATIONS  (STANDING):  aspirin enteric coated 81 milliGRAM(s) Oral daily  docusate sodium 100 milliGRAM(s) Oral three times a day  polyethylene glycol 3350 17 Gram(s) Oral daily  pantoprazole    Tablet 40 milliGRAM(s) Oral before breakfast  lactobacillus acidophilus and bulgaricus Chewable 1 Tablet(s) Chew three times a day  sorbitol 70% Solution 30 milliLiter(s) Oral once  senna 2 Tablet(s) Oral at bedtime  oxyCODONE  ER Tablet 10 milliGRAM(s) Oral every 12 hours  lisinopril 5 milliGRAM(s) Oral daily  acetaminophen   Tablet. 975 milliGRAM(s) Oral every 8 hours  heparin  Infusion. 1200 Unit(s)/Hr (12 mL/Hr) IV Continuous <Continuous>  lidocaine   Patch 1 Patch Transdermal daily    MEDICATIONS  (PRN):  bisacodyl Suppository 10 milliGRAM(s) Rectal daily PRN Constipation  heparin  Injectable 4500 Unit(s) IV Push every 6 hours PRN For aPTT less than 40  heparin  Injectable 2000 Unit(s) IV Push every 6 hours PRN For aPTT between 40 - 57  oxyCODONE    IR 5 milliGRAM(s) Oral every 6 hours PRN Severe Pain (7 - 10), breakthrough      Allergies    No Known Allergies    Intolerances          General:  No wt loss, fevers, chills, night sweats, fatigue,   Eyes:  Good vision, no reported pain  ENT:  No sore throat, pain, runny nose, dysphagia  CV:  No pain, palpitations, hypo/hypertension  Resp:  No dyspnea, cough, tachypnea, wheezing  GI:  No pain, No nausea, No vomiting, No diarrhea, No constipation, No weight loss, No fever, No pruritis, No rectal bleeding, No tarry stools, No dysphagia,  :  No pain, bleeding, incontinence, nocturia  Muscle:  No pain, weakness  Neuro:  No weakness, tingling, memory problems  Psych:  No fatigue, insomnia, mood problems, depression  Endocrine:  No polyuria, polydipsia, cold/heat intolerance  Heme:  No petechiae, ecchymosis, easy bruisability  Skin:  No rash, tattoos, scars, edema      PHYSICAL EXAM:   Vital Signs:  Vital Signs Last 24 Hrs  T(C): 36.8 (30 Sep 2017 10:00), Max: 36.8 (29 Sep 2017 20:00)  T(F): 98.2 (30 Sep 2017 10:00), Max: 98.2 (29 Sep 2017 20:00)  HR: 92 (30 Sep 2017 10:00) (84 - 97)  BP: --  BP(mean): 70 (30 Sep 2017 10:00) (70 - 84)  RR: 16 (30 Sep 2017 10:00) (16 - 18)  SpO2: 98% (30 Sep 2017 10:00) (98% - 100%)  Daily     Daily Weight in k.7 (30 Sep 2017 08:00)I&O's Summary    29 Sep 2017 07:  -  30 Sep 2017 07:00  --------------------------------------------------------  IN: 1054 mL / OUT: 1900 mL / NET: -846 mL    30 Sep 2017 07:01  -  30 Sep 2017 14:14  --------------------------------------------------------  IN: 780 mL / OUT: 0 mL / NET: 780 mL        GENERAL:  Appears stated age, well-groomed, well-nourished, no distress  HEENT:  NC/AT,  conjunctivae clear and pink, no thyromegaly, nodules, adenopathy, no JVD, sclera -anicteric  CHEST:  Full & symmetric excursion, no increased effort, breath sounds clear  HEART:  Regular rhythm, S1, S2, no murmur/rub/S3/S4, no abdominal bruit, no edema  ABDOMEN:  Soft, non-tender, non-distended, normoactive bowel sounds,  no masses ,no hepato-splenomegaly, no signs of chronic liver disease  EXTEREMITIES:  no cyanosis,clubbing or edema  SKIN:  No rash/erythema/ecchymoses/petechiae/wounds/abscess/warm/dry  NEURO:  Alert, oriented, no asterixis, no tremor, no encephalopathy      LABS:                        9.4    11.4  )-----------( 397      ( 30 Sep 2017 04:58 )             27.6     09-30    128<L>  |  92<L>  |  13  ----------------------------<  115<H>  5.0   |  26  |  0.64    Ca    9.4      30 Sep 2017 04:58      PT/INR - ( 29 Sep 2017 09:17 )   PT: 18.1 sec;   INR: 1.66 ratio         PTT - ( 30 Sep 2017 04:58 )  PTT:87.0 sec    amylase   lipase  RADIOLOGY & ADDITIONAL TESTS:

## 2017-09-30 NOTE — PROGRESS NOTE ADULT - PROBLEM SELECTOR PLAN 1
The left side pig tail catheter is placed: dark bloody fluid removed: Has significantly high LDH but ph is OK: Exudative fluid: there are no organisms growing in the fluid.  Patient is on broad spectrum antibiotics : ID is following  09/29/2017: exudative effusion ? all blood? negative cultures to date!!  09/30: cultures remain negative: antibiotics discontinued!!: The CHEST RADIOGRAPH YESTERDAY WAS CLEAR:  So far cause of abdominal pain is unclear: he has been on SC

## 2017-09-30 NOTE — PROGRESS NOTE ADULT - SUBJECTIVE AND OBJECTIVE BOX
Subjective Hello OOB  in chair no acute distress     VITAL SIGNS    Telemetry: NSR 70   Vital Signs Last 24 Hrs  T(C): 36.6 (17 @ 14:00), Max: 36.8 (17 @ 20:00)  T(F): 97.8 (17 @ 14:00), Max: 98.2 (17 @ 20:00)  HR: 90 (17 @ 14:00) (84 - 97)  BP: --  RR: 16 (17 @ 14:00) (16 - 18)  SpO2: 96% (17 @ 14:00) (96% - 100%)   LVAD   Speed 9000  flow 5.3  Power 5.2  PI 5.3           @ 07:01  -   @ 07:00  --------------------------------------------------------  IN: 1054 mL / OUT: 1900 mL / NET: -846 mL     @ 07:01  -   @ 16:22  --------------------------------------------------------  IN: 980 mL / OUT: 500 mL / NET: 480 mL    Daily Weight in k.7 (30 Sep 2017 08:00)    Coumadin    [x ] YES          [  ]      NO         Reason:     LVAD  MEDICATIONS  (STANDING):  aspirin enteric coated 81 milliGRAM(s) Oral daily  docusate sodium 100 milliGRAM(s) Oral three times a day  polyethylene glycol 3350 17 Gram(s) Oral daily  pantoprazole    Tablet 40 milliGRAM(s) Oral before breakfast  lactobacillus acidophilus and bulgaricus Chewable 1 Tablet(s) Chew three times a day  sorbitol 70% Solution 30 milliLiter(s) Oral once  senna 2 Tablet(s) Oral at bedtime  oxyCODONE  ER Tablet 10 milliGRAM(s) Oral every 12 hours  lisinopril 5 milliGRAM(s) Oral daily  acetaminophen   Tablet. 975 milliGRAM(s) Oral every 8 hours  heparin  Infusion. 1200 Unit(s)/Hr (12 mL/Hr) IV Continuous <Continuous>  lidocaine   Patch 1 Patch Transdermal daily    MEDICATIONS  (PRN):  bisacodyl Suppository 10 milliGRAM(s) Rectal daily PRN Constipation  heparin  Injectable 4500 Unit(s) IV Push every 6 hours PRN For aPTT less than 40  heparin  Injectable 2000 Unit(s) IV Push every 6 hours PRN For aPTT between 40 - 57  oxyCODONE    IR 5 milliGRAM(s) Oral every 6 hours PRN Severe Pain (7 - 10), breakthrough      PHYSICAL EXAM  Neurology: alert and oriented x 3, nonfocal, no gross deficits  CV :native heart sounds LVAD sound  Drive line dressing CDI  Lungs: CTA  Abdomen: soft, nontender, nondistended, positive bowel sounds,    :  voids           Extremities:  B/L + DP no edema +         Physical Therapy Rec:   Home  [  ]   Home w/ PT  [  ]  Rehab  [ x ]    Discussed with Cardiothoracic Team at AM rounds.

## 2017-09-30 NOTE — PROGRESS NOTE ADULT - ASSESSMENT
Mr. Quispe is a 62 year old man with a nonischemic cardiomyopathy with ACC/AHA stage D congestive heart failure who presented with abdominal pain due to cardiogenic shock, now s/p Heart Mate II LVAD with TV annuloplasty ring on 9/12. His postoperative course was complicated by bleeding requiring transfusions, which has stabilitzed. He currently is euvolemic without evidence of right heart failure. His INR is slightly subtherapeutic and his MAPs are at goal. Pt is s/p thoracentesis and chest tube placement 9/27  .     Chest tube removed, pt walking around with PT  Continue coumadin.   PT/INR - ( 29 Sep 2017 09:17 )   PT: 18.1 sec;   INR: 1.66 ratio

## 2017-10-01 LAB
ANION GAP SERPL CALC-SCNC: 11 MMOL/L — SIGNIFICANT CHANGE UP (ref 5–17)
APTT BLD: 103 SEC — HIGH (ref 27.5–37.4)
BUN SERPL-MCNC: 15 MG/DL — SIGNIFICANT CHANGE UP (ref 7–23)
CALCIUM SERPL-MCNC: 9.3 MG/DL — SIGNIFICANT CHANGE UP (ref 8.4–10.5)
CHLORIDE SERPL-SCNC: 91 MMOL/L — LOW (ref 96–108)
CO2 SERPL-SCNC: 26 MMOL/L — SIGNIFICANT CHANGE UP (ref 22–31)
CREAT SERPL-MCNC: 0.62 MG/DL — SIGNIFICANT CHANGE UP (ref 0.5–1.3)
GLUCOSE SERPL-MCNC: 178 MG/DL — HIGH (ref 70–99)
HCT VFR BLD CALC: 26.6 % — LOW (ref 39–50)
HGB BLD-MCNC: 8.9 G/DL — LOW (ref 13–17)
INR BLD: 2.05 RATIO — HIGH (ref 0.88–1.16)
MCHC RBC-ENTMCNC: 32.8 PG — SIGNIFICANT CHANGE UP (ref 27–34)
MCHC RBC-ENTMCNC: 33.5 GM/DL — SIGNIFICANT CHANGE UP (ref 32–36)
MCV RBC AUTO: 98 FL — SIGNIFICANT CHANGE UP (ref 80–100)
PLATELET # BLD AUTO: 389 K/UL — SIGNIFICANT CHANGE UP (ref 150–400)
POTASSIUM SERPL-MCNC: 5 MMOL/L — SIGNIFICANT CHANGE UP (ref 3.5–5.3)
POTASSIUM SERPL-SCNC: 5 MMOL/L — SIGNIFICANT CHANGE UP (ref 3.5–5.3)
PROTHROM AB SERPL-ACNC: 22.7 SEC — HIGH (ref 9.8–12.7)
RBC # BLD: 2.71 M/UL — LOW (ref 4.2–5.8)
RBC # FLD: 13.4 % — SIGNIFICANT CHANGE UP (ref 10.3–14.5)
SODIUM SERPL-SCNC: 128 MMOL/L — LOW (ref 135–145)
WBC # BLD: 11 K/UL — HIGH (ref 3.8–10.5)
WBC # FLD AUTO: 11 K/UL — HIGH (ref 3.8–10.5)

## 2017-10-01 PROCEDURE — 99233 SBSQ HOSP IP/OBS HIGH 50: CPT | Mod: 25,GC

## 2017-10-01 PROCEDURE — 93750 INTERROGATION VAD IN PERSON: CPT

## 2017-10-01 RX ORDER — WARFARIN SODIUM 2.5 MG/1
6 TABLET ORAL ONCE
Qty: 0 | Refills: 0 | Status: COMPLETED | OUTPATIENT
Start: 2017-10-01 | End: 2017-10-01

## 2017-10-01 RX ADMIN — Medication 975 MILLIGRAM(S): at 21:35

## 2017-10-01 RX ADMIN — OXYCODONE HYDROCHLORIDE 10 MILLIGRAM(S): 5 TABLET ORAL at 18:20

## 2017-10-01 RX ADMIN — LIDOCAINE 1 PATCH: 4 CREAM TOPICAL at 11:39

## 2017-10-01 RX ADMIN — SENNA PLUS 2 TABLET(S): 8.6 TABLET ORAL at 21:46

## 2017-10-01 RX ADMIN — PANTOPRAZOLE SODIUM 40 MILLIGRAM(S): 20 TABLET, DELAYED RELEASE ORAL at 06:37

## 2017-10-01 RX ADMIN — LACTOBACILLUS ACIDOPH-L.BULGARICUS 1 MILLION CELL CHEWABLE TABLET 1 TABLET(S): at 14:18

## 2017-10-01 RX ADMIN — Medication 975 MILLIGRAM(S): at 06:56

## 2017-10-01 RX ADMIN — WARFARIN SODIUM 6 MILLIGRAM(S): 2.5 TABLET ORAL at 21:35

## 2017-10-01 RX ADMIN — LACTOBACILLUS ACIDOPH-L.BULGARICUS 1 MILLION CELL CHEWABLE TABLET 1 TABLET(S): at 06:36

## 2017-10-01 RX ADMIN — LISINOPRIL 5 MILLIGRAM(S): 2.5 TABLET ORAL at 06:37

## 2017-10-01 RX ADMIN — Medication 81 MILLIGRAM(S): at 11:39

## 2017-10-01 RX ADMIN — OXYCODONE HYDROCHLORIDE 5 MILLIGRAM(S): 5 TABLET ORAL at 00:36

## 2017-10-01 RX ADMIN — Medication 975 MILLIGRAM(S): at 06:37

## 2017-10-01 RX ADMIN — OXYCODONE HYDROCHLORIDE 10 MILLIGRAM(S): 5 TABLET ORAL at 17:23

## 2017-10-01 RX ADMIN — OXYCODONE HYDROCHLORIDE 5 MILLIGRAM(S): 5 TABLET ORAL at 01:06

## 2017-10-01 NOTE — PROGRESS NOTE ADULT - SUBJECTIVE AND OBJECTIVE BOX
[facility] - Heart Failure    24H hour events: no acute events overnight. pt without complaints. tele monitoring reveals NSR with PACs at  BPM    MEDICATIONS:  aspirin enteric coated 81 milliGRAM(s) Oral daily  lisinopril 5 milliGRAM(s) Oral daily  warfarin 6 milliGRAM(s) Oral once        oxyCODONE  ER Tablet 10 milliGRAM(s) Oral every 12 hours  acetaminophen   Tablet. 975 milliGRAM(s) Oral every 8 hours  oxyCODONE    IR 5 milliGRAM(s) Oral every 6 hours PRN    docusate sodium 100 milliGRAM(s) Oral three times a day  polyethylene glycol 3350 17 Gram(s) Oral daily  pantoprazole    Tablet 40 milliGRAM(s) Oral before breakfast  bisacodyl Suppository 10 milliGRAM(s) Rectal daily PRN  sorbitol 70% Solution 30 milliLiter(s) Oral once  senna 2 Tablet(s) Oral at bedtime      lidocaine   Patch 1 Patch Transdermal daily      REVIEW OF SYSTEMS:  pt denies CP, SOB, ha, AMS, n/v/abd pain, dizziness, syncope, bright red/black stools    PHYSICAL EXAM:  T(C): 36.8 (10-01-17 @ 09:00), Max: 36.8 (09-30-17 @ 18:00)  HR: 91 (10-01-17 @ 05:00) (73 - 91)  BP: --  RR: 17 (10-01-17 @ 05:00) (16 - 18)  SpO2: 100% (10-01-17 @ 05:00) (96% - 100%)  Wt(kg): --  I&O's Summary    30 Sep 2017 07:01  -  01 Oct 2017 07:00  --------------------------------------------------------  IN: 1524 mL / OUT: 2450 mL / NET: -926 mL    01 Oct 2017 07:01  -  01 Oct 2017 12:26  --------------------------------------------------------  IN: 0 mL / OUT: 300 mL / NET: -300 mL        Appearance: Normal	  HEENT:   Normal oral mucosa, PERRL, EOMI	  Lymphatic: No lymphadenopathy  Cardiovascular: Normal S1 S2, No JVD, No murmurs, No edema  Respiratory: Lungs clear to auscultation	  Psychiatry: A & O x 3, Mood & affect appropriate  Gastrointestinal:  Soft, Non-tender, + BS	  Skin: No rashes, No ecchymoses, No cyanosis	  Neurologic: Non-focal  Extremities: Normal range of motion, No clubbing, cyanosis or edema  Vascular: Peripheral pulses palpable 2+ bilaterally        LABS:	 	    CBC Full  -  ( 01 Oct 2017 01:13 )  WBC Count : 11.0 K/uL  Hemoglobin : 8.9 g/dL  Hematocrit : 26.6 %  Platelet Count - Automated : 389 K/uL  Mean Cell Volume : 98.0 fl  Mean Cell Hemoglobin : 32.8 pg  Mean Cell Hemoglobin Concentration : 33.5 gm/dL  Auto Neutrophil # : x  Auto Lymphocyte # : x  Auto Monocyte # : x  Auto Eosinophil # : x  Auto Basophil # : x  Auto Neutrophil % : x  Auto Lymphocyte % : x  Auto Monocyte % : x  Auto Eosinophil % : x  Auto Basophil % : x    10-01    128<L>  |  91<L>  |  15  ----------------------------<  178<H>  5.0   |  26  |  0.62  09-30    128<L>  |  92<L>  |  13  ----------------------------<  115<H>  5.0   |  26  |  0.64    Ca    9.3      01 Oct 2017 01:13  Ca    9.4      30 Sep 2017 04:58        proBNP:   Lipid Profile:   HgA1c:   TSH:       CARDIAC MARKERS:            TELEMETRY: 	    ECG:  	  RADIOLOGY:  OTHER: 	    PREVIOUS DIAGNOSTIC TESTING:    [ ] Echocardiogram:  [ ]  Catheterization:  [ ] Stress Test:  	  	  ASSESSMENT/PLAN: 	    62M with a nonischemic cardiomyopathy with ACC/AHA stage D congestive heart failure who presented with abdominal pain due to cardiogenic shock, now s/p Heart Mate II LVAD with TV annuloplasty ring on 9/12. His postoperative course was complicated by bleeding requiring transfusions, which has stabilized He currently is euvolemic without evidence of right heart failure. his MAPs are at goal. Pt is s/p thoracentesis and chest tube placement 9/27 s/p removal        1. Chronic systolic congestive heart failure.   -clinically euvolemic on exam  - Continue lisinopril  continue to monitor MAPS LDH and INR   daily weights.       2. LVAD: heart mate II  -current setting:  pump speed 9200 flow 6.1LPM PI 6.3 Power 5.7W   -LDH stable.   -cont coumadin, INR > 2 today          4. Cardiac cachexia.  Plan: Ongoing nutritional support.         5. Hyponatremia.   -Na stable, con to monitor         Gt Granger MD [id number]

## 2017-10-01 NOTE — PROGRESS NOTE ADULT - SUBJECTIVE AND OBJECTIVE BOX
VITAL SIGNS-Telemetry:    Vital Signs Last 24 Hrs  T(C): 36.7 (10-01-17 @ 13:00), Max: 36.8 (09-30-17 @ 18:00)  T(F): 98.1 (10-01-17 @ 13:00), Max: 98.3 (09-30-17 @ 18:58)  HR: 82 (10-01-17 @ 13:00) (73 - 91)  BP: --  RR: 16 (10-01-17 @ 13:00) (16 - 18)  SpO2: 99% (10-01-17 @ 13:00) (98% - 100%)         09-30 @ 07:01  -  10-01 @ 07:00  --------------------------------------------------------  IN: 1524 mL / OUT: 2450 mL / NET: -926 mL    10-01 @ 07:01  -  10-01 @ 15:17  --------------------------------------------------------  IN: 480 mL / OUT: 500 mL / NET: -20 mL    Daily     Daily     Pacing Wires        [  ]   Settings:                                  Isolated  [  ]  Coumadin    [x ] YES          [  ]      NO         Reason:       PHYSICAL EXAM:  Neurology: alert and oriented x 3, nonfocal, no gross deficits  CV : +LVAD sounds  Sternal Wound :  CDI , Stable  Lungs: cta  Abdomen: soft, nontender, nondistended, positive bowel sounds, last bowel movement         Extremities:      no edema, pulses by doppler, no calf tenderness    aspirin enteric coated 81 milliGRAM(s) Oral daily  docusate sodium 100 milliGRAM(s) Oral three times a day  polyethylene glycol 3350 17 Gram(s) Oral daily  pantoprazole    Tablet 40 milliGRAM(s) Oral before breakfast  bisacodyl Suppository 10 milliGRAM(s) Rectal daily PRN  lactobacillus acidophilus and bulgaricus Chewable 1 Tablet(s) Chew three times a day  sorbitol 70% Solution 30 milliLiter(s) Oral once  senna 2 Tablet(s) Oral at bedtime  oxyCODONE  ER Tablet 10 milliGRAM(s) Oral every 12 hours  lisinopril 5 milliGRAM(s) Oral daily  acetaminophen   Tablet. 975 milliGRAM(s) Oral every 8 hours  lidocaine   Patch 1 Patch Transdermal daily  warfarin 6 milliGRAM(s) Oral once    Physical Therapy Rec:   Home  [  ]   Home w/ PT  [  ]  Rehab  [x ]  Discussed with Cardiothoracic Team at AM rounds.

## 2017-10-01 NOTE — PROGRESS NOTE ADULT - ASSESSMENT
61 yo male with no PMHx presented with sob and abdominal pain  on 8/25/17.  Cta - for PE,  cardiac mRI and cath revealed   lv dysfunction and MR.  Cardiology was consulted, HF service consulted and the pt was placed in ccu with pulmonary edema. He had  low cardiac output and cardiogenic shock, and was  referred  for LVAD evaluation. ID was consulted to clear the patient for surgery.. GI was following for abd pain, no significant findings on virtual colonoscopy. He was treated for chf  with  afterload reduction  and milrinone and stabilized, euvolemic .  9/12/17 S/P Implantation of HeartMate II left ventricular assist device through median sternotomy, and repair of the tricuspid valve with a number 28 classic Christianson ring.  Post op he required inotropic support and PRBc's for acute blood loss anemia  He was transferred to sdu  9/18  He has c/o pain at the incision site and chest tube site  Chest tube to be d/c  9/20  Pain improved .  9/21 INR drifted to 1.8, Heparin started.  Lisinopril increased for BP and afterload  Leukocytosis, afebrile  Hyponatremia, off lasix  9/23 Leukocytosis WBC 19  CT chest/ab/plevis ordered  blood cx x2  ID consulted fluid bolus .9  per Dr Iyer low sodium, diet d/c regular diet ordered INR 2.08 heparin gtt d/c coumadin 5 tonight  9/24 cta done 2nd limited study prior.  Abdominal discomfort relieved with pain meds, d/w pain service, oxycontin recommended .  Ct scan with contrast reviewed by Dr Cadet.  Pt with loculated LLL effusion vs atelectasis, wbc improving but remains on antibiotics.  The coumadin was low dosed , awaiting IR tap of the loculated effusion and cultures to be sent.  Low map, ivf and decrease lisinopril  The IR procedure was cancelled by IR yesterday and he is still awaiting the thoracentisis.  The INR was below 2.0, heparin started.  9/27 pigtail placed in IR>300cc  with cultures neg to date.  F/u cultures, chest tube to water seal today , possible d/c later today.  9/30 Heparin Gtt coumadin dosed daily ambulated today

## 2017-10-02 DIAGNOSIS — F43.21 ADJUSTMENT DISORDER WITH DEPRESSED MOOD: ICD-10-CM

## 2017-10-02 LAB
ANION GAP SERPL CALC-SCNC: 10 MMOL/L — SIGNIFICANT CHANGE UP (ref 5–17)
BUN SERPL-MCNC: 14 MG/DL — SIGNIFICANT CHANGE UP (ref 7–23)
CALCIUM SERPL-MCNC: 9.7 MG/DL — SIGNIFICANT CHANGE UP (ref 8.4–10.5)
CHLORIDE SERPL-SCNC: 92 MMOL/L — LOW (ref 96–108)
CO2 SERPL-SCNC: 28 MMOL/L — SIGNIFICANT CHANGE UP (ref 22–31)
CREAT SERPL-MCNC: 0.6 MG/DL — SIGNIFICANT CHANGE UP (ref 0.5–1.3)
CULTURE RESULTS: SIGNIFICANT CHANGE UP
GLUCOSE SERPL-MCNC: 119 MG/DL — HIGH (ref 70–99)
HCT VFR BLD CALC: 28.8 % — LOW (ref 39–50)
HGB BLD-MCNC: 9.8 G/DL — LOW (ref 13–17)
INR BLD: 2.1 RATIO — HIGH (ref 0.88–1.16)
LDH SERPL L TO P-CCNC: 396 U/L — HIGH (ref 50–242)
MCHC RBC-ENTMCNC: 33 PG — SIGNIFICANT CHANGE UP (ref 27–34)
MCHC RBC-ENTMCNC: 33.9 GM/DL — SIGNIFICANT CHANGE UP (ref 32–36)
MCV RBC AUTO: 97.2 FL — SIGNIFICANT CHANGE UP (ref 80–100)
PLATELET # BLD AUTO: 364 K/UL — SIGNIFICANT CHANGE UP (ref 150–400)
POTASSIUM SERPL-MCNC: 4.8 MMOL/L — SIGNIFICANT CHANGE UP (ref 3.5–5.3)
POTASSIUM SERPL-SCNC: 4.8 MMOL/L — SIGNIFICANT CHANGE UP (ref 3.5–5.3)
PROTHROM AB SERPL-ACNC: 23 SEC — HIGH (ref 9.8–12.7)
RBC # BLD: 2.96 M/UL — LOW (ref 4.2–5.8)
RBC # FLD: 13.5 % — SIGNIFICANT CHANGE UP (ref 10.3–14.5)
SODIUM SERPL-SCNC: 130 MMOL/L — LOW (ref 135–145)
SPECIMEN SOURCE: SIGNIFICANT CHANGE UP
WBC # BLD: 10.1 K/UL — SIGNIFICANT CHANGE UP (ref 3.8–10.5)
WBC # FLD AUTO: 10.1 K/UL — SIGNIFICANT CHANGE UP (ref 3.8–10.5)

## 2017-10-02 PROCEDURE — 93750 INTERROGATION VAD IN PERSON: CPT

## 2017-10-02 PROCEDURE — 99233 SBSQ HOSP IP/OBS HIGH 50: CPT | Mod: 25

## 2017-10-02 PROCEDURE — 99222 1ST HOSP IP/OBS MODERATE 55: CPT

## 2017-10-02 RX ORDER — MIRTAZAPINE 45 MG/1
7.5 TABLET, ORALLY DISINTEGRATING ORAL AT BEDTIME
Qty: 0 | Refills: 0 | Status: DISCONTINUED | OUTPATIENT
Start: 2017-10-02 | End: 2017-10-06

## 2017-10-02 RX ORDER — WARFARIN SODIUM 2.5 MG/1
6 TABLET ORAL ONCE
Qty: 0 | Refills: 0 | Status: COMPLETED | OUTPATIENT
Start: 2017-10-02 | End: 2017-10-02

## 2017-10-02 RX ORDER — WARFARIN SODIUM 2.5 MG/1
6 TABLET ORAL ONCE
Qty: 0 | Refills: 0 | Status: DISCONTINUED | OUTPATIENT
Start: 2017-10-02 | End: 2017-10-02

## 2017-10-02 RX ADMIN — WARFARIN SODIUM 6 MILLIGRAM(S): 2.5 TABLET ORAL at 22:28

## 2017-10-02 RX ADMIN — LIDOCAINE 1 PATCH: 4 CREAM TOPICAL at 11:00

## 2017-10-02 RX ADMIN — PANTOPRAZOLE SODIUM 40 MILLIGRAM(S): 20 TABLET, DELAYED RELEASE ORAL at 05:06

## 2017-10-02 RX ADMIN — OXYCODONE HYDROCHLORIDE 10 MILLIGRAM(S): 5 TABLET ORAL at 18:24

## 2017-10-02 RX ADMIN — OXYCODONE HYDROCHLORIDE 10 MILLIGRAM(S): 5 TABLET ORAL at 05:32

## 2017-10-02 RX ADMIN — OXYCODONE HYDROCHLORIDE 10 MILLIGRAM(S): 5 TABLET ORAL at 05:06

## 2017-10-02 RX ADMIN — OXYCODONE HYDROCHLORIDE 10 MILLIGRAM(S): 5 TABLET ORAL at 19:00

## 2017-10-02 RX ADMIN — LIDOCAINE 1 PATCH: 4 CREAM TOPICAL at 00:30

## 2017-10-02 RX ADMIN — Medication 81 MILLIGRAM(S): at 11:00

## 2017-10-02 RX ADMIN — SENNA PLUS 2 TABLET(S): 8.6 TABLET ORAL at 22:28

## 2017-10-02 RX ADMIN — LIDOCAINE 1 PATCH: 4 CREAM TOPICAL at 22:31

## 2017-10-02 RX ADMIN — MIRTAZAPINE 7.5 MILLIGRAM(S): 45 TABLET, ORALLY DISINTEGRATING ORAL at 22:28

## 2017-10-02 RX ADMIN — LISINOPRIL 5 MILLIGRAM(S): 2.5 TABLET ORAL at 05:06

## 2017-10-02 NOTE — CHART NOTE - NSCHARTNOTEFT_GEN_A_CORE
Source: Patient [ X]    Family [ ]     other [ X] RN, medical record,  NP    Pt seen for malnutrition follow up. Pt reports a fair PO intake, states he is receiving foods from home (chicken). Pt reports a variable intake of inhouse foods and is drinking Ensure enlive x1 daily. Pt was encouraged to consumes 2 ensure enlive daily, in between meals. Pt expresses desire to be able to care for himself once discharged however is reluctant to review coumadin nutrition education. With encouragement Pt was amenable to coumadin and vitamin K drug interaction education. Pt able to verbalize relationship with encouragement however requires additional review.     Per chart Pt with chronic systolic HF s/p LVAD, cardiac cachexia and hyponatremia.      Diet : Regular, Ensure enlive x3       Patient reports no GI distress at this time.      PO intake:  variable.     Source for PO intake [ X] Patient    Admission WtL 61.1kg  9/28 wt: 57.7kg  Current Weight: 57.7kg, Wt stable at this time.       Pertinent Medications: MEDICATIONS  (STANDING):  aspirin enteric coated 81 milliGRAM(s) Oral daily  pantoprazole    Tablet 40 milliGRAM(s) Oral before breakfast  senna 2 Tablet(s) Oral at bedtime  oxyCODONE  ER Tablet 10 milliGRAM(s) Oral every 12 hours  lisinopril 5 milliGRAM(s) Oral daily  lidocaine   Patch 1 Patch Transdermal daily  warfarin 6 milliGRAM(s) Oral once  warfarin 6 milliGRAM(s) Oral once      Pertinent Labs:  Hgb/Hct:9.8/28.8-high, Na:130-low, K:4.8, Cl:92-low, BUN:14, Cr:0.6, Glucose:119-high, Ca:9.7,       Skin: intact     Estimated Needs:   [X ] no change since previous assessment  [ ] recalculated:       Previous Nutrition Diagnosis:     [ X] Increased Nutrient Needs; addressed with PO supplements   [ X] Malnutrition: addressed with PO supplements   [ X] Food & Nutrition Related Knowledge Deficit; slowly improving with diet education     Nutrition Diagnosis is [X] ongoing        New Nutrition Diagnosis: [X ] not applicable     Interventions:     Recommend    1. Continue Ensure enlive x daily   2. Continue HF and coumadin education review   3. Encourage adequate PO intake at meal times  4. Provide food preferences medically feasible        Monitoring and Evaluation:     [ X] PO intake [ X] Tolerance to diet prescription [X ] weights [ X] follow up per protocol    [X ] other: RD remains available Sarah Siegler RD. Pager #[phone]

## 2017-10-02 NOTE — PROGRESS NOTE ADULT - SUBJECTIVE AND OBJECTIVE BOX
Subjective:    Medications:  aspirin enteric coated 81 milliGRAM(s) Oral daily  pantoprazole    Tablet 40 milliGRAM(s) Oral before breakfast  senna 2 Tablet(s) Oral at bedtime  oxyCODONE  ER Tablet 10 milliGRAM(s) Oral every 12 hours  lisinopril 5 milliGRAM(s) Oral daily  lidocaine   Patch 1 Patch Transdermal daily  warfarin 6 milliGRAM(s) Oral once      PHYSICAL EXAM:  Vital Signs Last 24 Hrs  T(C): 36.8 (02 Oct 2017 05:00), Max: 37 (01 Oct 2017 18:54)  T(F): 98.2 (02 Oct 2017 05:00), Max: 98.6 (01 Oct 2017 18:54)  HR: 85 (02 Oct 2017 05:00) (82 - 97)  BP: --  BP(mean): 78 (02 Oct 2017 05:00) (76 - 84)  RR: 18 (02 Oct 2017 05:00) (16 - 18)  SpO2: 100% (02 Oct 2017 05:00) (99% - 100%)  Daily Weight in k.7 (01 Oct 2017 17:00)    01 Oct 2017 07:01  -  02 Oct 2017 07:00  --------------------------------------------------------  IN:    Oral Fluid: 730 mL  Total IN: 730 mL    OUT:    Voided: 2150 mL  Total OUT: 2150 mL    Total NET: -1420 mL      General: A/ox 3, No acute Distress  Neck: Supple, NO JVD  Cardiac: S1 S2, Normal LVAD sounds   Pulmonary: CTAB, Breathing unlabored, No Rhonchi/Rales/Wheezing  Abdomen: Soft, Non -tender, +BS   Extremities: No Rashes, No edema  Neuro: A/o x 3, No focal deficits  Psch: normal mood , normal affect                         9.8    10.1  )-----------( 364      ( 02 Oct 2017 05:26 )             28.8     10-02    130<L>  |  92<L>  |  14  ----------------------------<  119<H>  4.8   |  28  |  0.60    Ca    9.7      02 Oct 2017 05:26      PT/INR - ( 02 Oct 2017 05:26 )   PT: 23.0 sec;   INR: 2.10 ratio         PTT - ( 01 Oct 2017 01:13 )  PTT:103.0 sec   Subjective: Pt sitting in chair      Medications:  aspirin enteric coated 81 milliGRAM(s) Oral daily  pantoprazole    Tablet 40 milliGRAM(s) Oral before breakfast  senna 2 Tablet(s) Oral at bedtime  oxyCODONE  ER Tablet 10 milliGRAM(s) Oral every 12 hours  lisinopril 5 milliGRAM(s) Oral daily  lidocaine   Patch 1 Patch Transdermal daily  warfarin 6 milliGRAM(s) Oral once      PHYSICAL EXAM:  Vital Signs Last 24 Hrs  T(C): 36.8 (02 Oct 2017 05:00), Max: 37 (01 Oct 2017 18:54)  T(F): 98.2 (02 Oct 2017 05:00), Max: 98.6 (01 Oct 2017 18:54)  HR: 85 (02 Oct 2017 05:00) (82 - 97  BP(mean): 78 (02 Oct 2017 05:00) (76 - 84)  RR: 18 (02 Oct 2017 05:00) (16 - 18)  SpO2: 100% (02 Oct 2017 05:00) (99% - 100%)  Daily Weight in k.7 (01 Oct 2017 17:00)    01 Oct 2017 07:01  -  02 Oct 2017 07:00  --------------------------------------------------------  IN:    Oral Fluid: 730 mL  Total IN: 730 mL    OUT:    Voided: 2150 mL  Total OUT: 2150 mL    Total NET: -1420 mL      General: A/ox 3, No acute Distress  Neck: Supple, NO JVD  Cardiac: S1 S2, Normal LVAD sounds   Pulmonary: CTAB, Breathing unlabored, No Rhonchi/Rales/Wheezing  Abdomen: Soft, Non -tender, +BS   Extremities: No Rashes, No edema  Neuro: A/o x 3, No focal deficits  Psch: normal mood , normal affect  LVAD Interrogation:  Pump Flow:5.6  Pump Speed:9000  Pulse Index: 5.4  Pump Power:5.5  VAD Events:   Driveline evaluation: clean and dry    Programming Changes: [ ] No changes made                         9.8    10.1  )-----------( 364      ( 02 Oct 2017 05:26 )             28.8     10-02    130<L>  |  92<L>  |  14  ----------------------------<  119<H>  4.8   |  28  |  0.60    Ca    9.7      02 Oct 2017 05:26      PT/INR - ( 02 Oct 2017 05:26 )   PT: 23.0 sec;   INR: 2.10 ratio         PTT - ( 01 Oct 2017 01:13 )  PTT:103.0 sec

## 2017-10-02 NOTE — PROGRESS NOTE ADULT - SUBJECTIVE AND OBJECTIVE BOX
Patient is a 62y old  Male who presents with a chief complaint of SOOB (25 Aug 2017 22:27)    pt is doing ok  no SOB     still with abdominal pain '  Any change in ROS:     MEDICATIONS  (STANDING):  aspirin enteric coated 81 milliGRAM(s) Oral daily  pantoprazole    Tablet 40 milliGRAM(s) Oral before breakfast  senna 2 Tablet(s) Oral at bedtime  oxyCODONE  ER Tablet 10 milliGRAM(s) Oral every 12 hours  lisinopril 5 milliGRAM(s) Oral daily  lidocaine   Patch 1 Patch Transdermal daily  warfarin 6 milliGRAM(s) Oral once  warfarin 6 milliGRAM(s) Oral once    MEDICATIONS  (PRN):    Vital Signs Last 24 Hrs  T(C): 36.5 (02 Oct 2017 15:00), Max: 37 (01 Oct 2017 18:54)  T(F): 97.7 (02 Oct 2017 15:00), Max: 98.6 (01 Oct 2017 18:54)  HR: 97 (02 Oct 2017 15:00) (85 - 98)  BP: --  BP(mean): 74 (02 Oct 2017 15:00) (74 - 84)  RR: 17 (02 Oct 2017 15:00) (17 - 18)  SpO2: 99% (02 Oct 2017 15:00) (99% - 100%)    I&O's Summary    01 Oct 2017 07:01  -  02 Oct 2017 07:00  --------------------------------------------------------  IN: 730 mL / OUT: 2150 mL / NET: -1420 mL    02 Oct 2017 07:01  -  02 Oct 2017 16:00  --------------------------------------------------------  IN: 480 mL / OUT: 300 mL / NET: 180 mL          Physical Exam:   GENERAL: cachectic  HEENT: ALONSO/   Atraumatic, Normocephalic  ENMT: No tonsillar erythema, exudates, or enlargement; Moist mucous membranes, Good dentition, No lesions  NECK: Supple, No JVD, Normal thyroid  CHEST/LUNG: Clear to auscultaion, ; No rales, rhonchi, wheezing, or rubs  CVS: Regular rate and rhythm; No murmurs, rubs, or gallops  GI: : Soft, Nontender, Nondistended; Bowel sounds present  NERVOUS SYSTEM:  Alert & Oriented X3  EXTREMITIES:  2+ Peripheral Pulses, No clubbing, cyanosis, or edema  LYMPH: No lymphadenopathy noted  SKIN: No rashes or lesions  ENDOCRINOLOGY: No Thyromegaly  PSYCH: Appropriate    Labs:                              9.8    10.1  )-----------( 364      ( 02 Oct 2017 05:26 )             28.8                         8.9    11.0  )-----------( 389      ( 01 Oct 2017 01:13 )             26.6                         9.4    11.4  )-----------( 397      ( 30 Sep 2017 04:58 )             27.6                         9.8    12.8  )-----------( 451      ( 29 Sep 2017 02:03 )             29.6     10-02    130<L>  |  92<L>  |  14  ----------------------------<  119<H>  4.8   |  28  |  0.60  10-01    128<L>  |  91<L>  |  15  ----------------------------<  178<H>  5.0   |  26  |  0.62  09-30    128<L>  |  92<L>  |  13  ----------------------------<  115<H>  5.0   |  26  |  0.64  09-29    125<L>  |  89<L>  |  12  ----------------------------<  174<H>  4.2   |  24  |  0.60    Ca    9.7      02 Oct 2017 05:26  Ca    9.3      01 Oct 2017 01:13      CAPILLARY BLOOD GLUCOSE            PT/INR - ( 02 Oct 2017 05:26 )   PT: 23.0 sec;   INR: 2.10 ratio         PTT - ( 01 Oct 2017 01:13 )  PTT:103.0 sec    Fluid Source --  Albumin, Fluid2.3 g/dL  Glucose, Fluid98 mg/dL  Protein total, Fluid4.4 g/dL  Lacatate Dehydrogenase, [id number] U/L  pH, Fluid7.80  Cytopathology-Non Gyn Report--  Cultures:       < from: Xray Chest 1 View AP- PORTABLE-Urgent (09.29.17 @ 16:37) >  PROCEDURE DATE:  09/29/2017            INTERPRETATION:  AP chest with comparison to 1347 hours from the same day.    IMPRESSION: Left pigtail catheter removed.    IMPRESSION: Left-sided pigtail catheter has been removed. There is no   pneumothorax. The heart is normal in size. The patient is status post   median sternotomy. The lungs are clear.                    TIA BENAVIDES M.D., ATTENDING RADIOLOGIST  This document has been electronically signed. Sep 30 2017  4:18PM    < end of copied text >                          Studies  Chest X-RAY  CT SCAN Chest   Venous Dopplers: LE:   CT Abdomen  Others

## 2017-10-02 NOTE — BEHAVIORAL HEALTH ASSESSMENT NOTE - AXIS III
cardiogenic shock secondary to  nonischemic dilated cardiomyopathy, severely reduced LV systolic function (LVEF 10%), s/p  left ventricular assist system (LVAS)  on 9/12/2017

## 2017-10-02 NOTE — PROGRESS NOTE ADULT - ASSESSMENT
2M with a nonischemic cardiomyopathy with ACC/AHA stage D congestive heart failure who presented with abdominal pain due to cardiogenic shock, now s/p Heart Mate II LVAD with TV annuloplasty ring on 9/12. His postoperative course was complicated by bleeding requiring transfusions, which has stabilized He currently is euvolemic without evidence of right heart failure. his MAPs are at goal. Pt is s/p thoracentesis and chest tube placement 9/27 s/p removal 2M with a nonischemic cardiomyopathy with ACC/AHA stage D congestive heart failure who presented with abdominal pain due to cardiogenic shock, now s/p Heart Mate II LVAD with TV annuloplasty ring on 9/12. His postoperative course was complicated by bleeding requiring transfusions, which has stabilized He currently is euvolemic without evidence of right heart failure. his MAPs are at goal. Pt is s/p thoracentesis and chest tube placement 9/27 s/p removal  9/28 Pt presenting today feeling somewhat better euvolemic   no JVD NO PI events sine 9/29 , chest tube remains out  chest x ray 9/29 clear

## 2017-10-02 NOTE — BEHAVIORAL HEALTH ASSESSMENT NOTE - HPI (INCLUDE ILLNESS QUALITY, SEVERITY, DURATION, TIMING, CONTEXT, MODIFYING FACTORS, ASSOCIATED SIGNS AND SYMPTOMS)
Patient is a 61 y/o Male from [facility], with no significant PPHx and no prior documented PMHx, admitted with cardiogenic shock secondary to  nonischemic dilated cardiomyopathy, severely reduced LV systolic function (LVEF 10%), s/p  left ventricular assist system (LVAS)  on 9/12/2017. Psychiatry consulted after psychologist and primary team noticed pt becoming progressively more withdrawn.    Pt found lying in bed, makes poor eye contact, despondent, irritable, engages minimally in interview. He c/o poor sleep, reporting “no sleep” since admission to the hospital. He also reports poor appetite, states, "that food is no good." Lunch tray at bedside appears uneaten. Pt becomes irritable, refusing to answer questions, stating "you know all the answers, don't play with me." Pt is hopeful that his medical issues will resolve and that he would be able to go home soon, is noted to have a cross around his neck. He denies SI/HI. Pt denies perceptual disturbances, denies paranoid or grandiose delusions. Patient is a 61 y/o Male from [facility], with no significant PPHx and no prior documented PMHx, admitted with cardiogenic shock secondary to  nonischemic dilated cardiomyopathy, severely reduced LV systolic function (LVEF 10%), s/p  left ventricular assist system (LVAS)  on 9/12/2017. Psychiatry consulted after psychologist and primary team noticed pt becoming progressively more withdrawn.    Pt found lying in bed, makes poor eye contact, despondent, irritable, engages minimally in interview. He c/o poor sleep, reporting “no sleep” since admission to the hospital. He also reports poor appetite, states, "that food is no good." Lunch tray at bedside appears uneaten. Pt becomes irritable, refusing to answer questions, stating "you know all the answers, don't play with me." Pt is hopeful that his medical issues will resolve and that he would be able to go home soon, is noted to have a cross around his neck. He denies SI/HI. Pt denies perceptual disturbances, denies paranoid or grandiose delusions. Pt also reports poor appetite and weight loss. Pt. didn't' answer questions regarding etoh or illicit drug use.

## 2017-10-02 NOTE — BEHAVIORAL HEALTH ASSESSMENT NOTE - SUMMARY
Patient is a 61 y/o Male from [facility], with no significant PPHx and no prior documented PMHx, admitted with cardiogenic shock secondary to nonischemic dilated cardiomyopathy, severely reduced LV systolic function (LVEF 10%), s/p  left ventricular assist system (LVAS)  on 9/12/2017. Psychiatry consulted after psychologist and primary team noticed pt becoming progressively more withdrawn. Upon interview pt endorsed multiple neurovegetative signs and symptoms including poor sleep and appetite, poor energy, excessive irritability and constricted affect. He denies SI/HI. These symptoms are present in the setting of prolonged hospitalization. At this time pt does not meet full criteria for MDD and his symptoms are possibly, situational and will resolve upon discharge home. Pt does not meet criteria for in-patient psychiatric hospitalization at this time.    1) Counseling, psychoeducation and support provided  2) Please initiate Remeron 7.5mg PO QHS with plans to titrate to therapeutic dose  3) We will obtain collateral from family Patient is a 63 y/o Male from [facility], with no significant PPHx and no prior documented PMHx, admitted with cardiogenic shock secondary to nonischemic dilated cardiomyopathy, severely reduced LV systolic function (LVEF 10%), s/p  left ventricular assist system (LVAS)  on 9/12/2017. Psychiatry consulted after psychologist and primary team noticed pt becoming progressively more withdrawn. Upon interview pt endorsed multiple neurovegetative signs and symptoms including poor sleep and appetite, poor energy, excessive irritability and constricted affect. He denies SI/HI. These symptoms are present in the setting of prolonged hospitalization. At this time pt does not meet full criteria for MDD and his symptoms are possibly, situational and will resolve upon discharge home. Pt does not meet criteria for inpatient psychiatric hospitalization at this time.    1) Counseling, psychoeducation and support provided  2) Please initiate Remeron 7.5mg PO QHS with plans to titrate to therapeutic dose  3) We will obtain collateral from family

## 2017-10-02 NOTE — PROGRESS NOTE ADULT - ASSESSMENT
61 yo male with no PMHx presented with sob and abdominal pain  on 8/25/17.  Cta - for PE,  cardiac mRI and cath revealed   lv dysfunction and MR.  Cardiology was consulted, HF service consulted and the pt was placed in ccu with pulmonary edema. He had  low cardiac output and cardiogenic shock, and was  referred  for LVAD evaluation. ID was consulted to clear the patient for surgery.. GI was following for abd pain, no significant findings on virtual colonoscopy. He was treated for chf  with  afterload reduction  and milrinone and stabilized, euvolemic .  9/12/17 S/P Implantation of HeartMate II left ventricular assist device through median sternotomy, and repair of the tricuspid valve with a number 28 classic Christianson ring.  Post op he required inotropic support and PRBc's for acute blood loss anemia  He was transferred to sdu  9/18  He has c/o pain at the incision site and chest tube site  Chest tube to be d/c  9/20  Pain improved .  9/21 INR drifted to 1.8, Heparin started.  Lisinopril increased for BP and afterload  Leukocytosis, afebrile  Hyponatremia, off lasix  9/23 Leukocytosis WBC 19  CT chest/ab/plevis ordered  blood cx x2  ID consulted fluid bolus .9  per Dr Iyer low sodium, diet d/c regular diet ordered INR 2.08 heparin gtt d/c coumadin 5 tonight  9/24 cta done 2nd limited study prior.  Abdominal discomfort relieved with pain meds, d/w pain service, oxycontin recommended .  Ct scan with contrast reviewed by Dr Cadet.  Pt with loculated LLL effusion vs atelectasis, wbc improving but remains on antibiotics.  The coumadin was low dosed , awaiting IR tap of the loculated effusion and cultures to be sent.  Low map, ivf and decrease lisinopril  The IR procedure was cancelled by IR yesterday and he is still awaiting the thoracentisis.  The INR was below 2.0, heparin started.  9/27 pigtail placed in IR>300cc  with cultures neg to date.  F/u cultures, chest tube to water seal today , possible d/c later today.  9/30 Heparin Gtt  coumadin dosed daily ambulated today  10/2 transfer to floor  Increase ambulation  Cardiology f/u

## 2017-10-02 NOTE — PROGRESS NOTE ADULT - PROBLEM SELECTOR PLAN 2
stable: His CT scan is reviewed: small loculated effusion: doubt any infection, for possible diagnostic tap today, to rule out any infection: He had small pleural effusion on the left side on 8/25 ct scan chest : This seems to be simple loculated effusion ( secondary to chronicity): The etiology of abdominal pain Is not clear: , though it is chronic!!  8/27/2017: for tap today  s/p tap yesterday: await cultures/: exudative fluid!!  9/29: as above   today's chest xray with some haziness at left base: chest tube in place  9/30: yesterdays chest x-ray reported as clear: chest tube removed: pt is stable from pulmonary perspective.  10/02: stable following removal of chest tube: cultures remain negative in pleural fluid

## 2017-10-02 NOTE — PROGRESS NOTE ADULT - SUBJECTIVE AND OBJECTIVE BOX
INTERVAL HPI/OVERNIGHT EVENTS: no new events    HPI:  62M with no PMHx here with chest pain. Began last week, described as pleuritic, worsens with cough and deep inspiration. Has been having SOB as well, dry cough. Recently vacationed in [facility] came back today. Saw doctor in [facility] but unclear what workup was. No fever, sick contacts, abd pain. CP described as midsternal, nonradiating. Has 25 pack year smoking history.  CTPA does not reveal PE, Pulm congestion noted.   No new overnight event.  No N/V/D.  Tolerating diet.    MEDICATIONS  (STANDING):  aspirin enteric coated 81 milliGRAM(s) Oral daily  docusate sodium 100 milliGRAM(s) Oral three times a day  polyethylene glycol 3350 17 Gram(s) Oral daily  pantoprazole    Tablet 40 milliGRAM(s) Oral before breakfast  lactobacillus acidophilus and bulgaricus Chewable 1 Tablet(s) Chew three times a day  sorbitol 70% Solution 30 milliLiter(s) Oral once  senna 2 Tablet(s) Oral at bedtime  oxyCODONE  ER Tablet 10 milliGRAM(s) Oral every 12 hours  lisinopril 5 milliGRAM(s) Oral daily  acetaminophen   Tablet. 975 milliGRAM(s) Oral every 8 hours  lidocaine   Patch 1 Patch Transdermal daily  warfarin 6 milliGRAM(s) Oral once    MEDICATIONS  (PRN):  bisacodyl Suppository 10 milliGRAM(s) Rectal daily PRN Constipation      Allergies    No Known Allergies    Intolerances          General:  No wt loss, fevers, chills, night sweats, fatigue,   Eyes:  Good vision, no reported pain  ENT:  No sore throat, pain, runny nose, dysphagia  CV:  No pain, palpitations, hypo/hypertension  Resp:  No dyspnea, cough, tachypnea, wheezing  GI:  No pain, No nausea, No vomiting, No diarrhea, No constipation, No weight loss, No fever, No pruritis, No rectal bleeding, No tarry stools, No dysphagia,  :  No pain, bleeding, incontinence, nocturia  Muscle:  No pain, weakness  Neuro:  No weakness, tingling, memory problems  Psych:  No fatigue, insomnia, mood problems, depression  Endocrine:  No polyuria, polydipsia, cold/heat intolerance  Heme:  No petechiae, ecchymosis, easy bruisability  Skin:  No rash, tattoos, scars, edema      PHYSICAL EXAM:   Vital Signs:  Vital Signs Last 24 Hrs  T(C): 36.7 (01 Oct 2017 13:00), Max: 36.8 (30 Sep 2017 18:00)  T(F): 98.1 (01 Oct 2017 13:00), Max: 98.3 (30 Sep 2017 18:58)  HR: 82 (01 Oct 2017 13:00) (73 - 91)  BP: --  BP(mean): 76 (01 Oct 2017 13:00) (72 - 84)  RR: 16 (01 Oct 2017 13:00) (16 - 18)  SpO2: 99% (01 Oct 2017 13:00) (98% - 100%)  Daily     Daily I&O's Summary    30 Sep 2017 07:01  -  01 Oct 2017 07:00  --------------------------------------------------------  IN: 1524 mL / OUT: 2450 mL / NET: -926 mL    01 Oct 2017 07:01  -  01 Oct 2017 15:36  --------------------------------------------------------  IN: 480 mL / OUT: 500 mL / NET: -20 mL        GENERAL:  Appears stated age, well-groomed, well-nourished, no distress  HEENT:  NC/AT,  conjunctivae clear and pink, no thyromegaly, nodules, adenopathy, no JVD, sclera -anicteric  CHEST:  Full & symmetric excursion, no increased effort, breath sounds clear  HEART:  Regular rhythm, S1, S2, no murmur/rub/S3/S4, no abdominal bruit, no edema  ABDOMEN:  Soft, non-tender, non-distended, normoactive bowel sounds,  no masses ,no hepato-splenomegaly, no signs of chronic liver disease  EXTEREMITIES:  no cyanosis,clubbing or edema  SKIN:  No rash/erythema/ecchymoses/petechiae/wounds/abscess/warm/dry  NEURO:  Alert, oriented, no asterixis, no tremor, no encephalopathy      LABS:                        8.9    11.0  )-----------( 389      ( 01 Oct 2017 01:13 )             26.6     10-01    128<L>  |  91<L>  |  15  ----------------------------<  178<H>  5.0   |  26  |  0.62    Ca    9.3      01 Oct 2017 01:13      PT/INR - ( 01 Oct 2017 01:13 )   PT: 22.7 sec;   INR: 2.05 ratio         PTT - ( 01 Oct 2017 01:13 )  PTT:103.0 sec    amylase   lipase  RADIOLOGY & ADDITIONAL TESTS:

## 2017-10-02 NOTE — BEHAVIORAL HEALTH ASSESSMENT NOTE - CASE SUMMARY
62y.o Male with no known PMHx presented in cardiogenic shock, found to have severe systolic dysfunction,  s/p LVAD. Pt has no prior PPHx, psychiatry consulted for depression. pt. reports feeling sad and reports significant insomnia, and lack of appetite, pt denies any si/hi/i/p, pt is oriented to place and situation. Pt. denies any kenan or psychosis. pt. has never tried any psychiatric medications before. Pt. also reports weight loss. Pt. also c/o chest pain.   No need for 1:1, no psychiatric admission needed  would start Remeron 7.5mg hs for sleep and appetite, as long as there is no contraindication   check b12, folate, TSH, vit D.   support/reassurance 62y.o Male with no known PMHx presented in cardiogenic shock, found to have severe systolic dysfunction.  Pt has no prior PPHx, psychiatry consulted for depression. pt. reports feeling sad and reports significant insomnia, and lack of appetite, pt denies any si/hi/i/p, pt is oriented to place and situation. Pt. denies any kenan or psychosis. pt. has never tried any psychiatric medications before. Pt. also reports weight loss. Pt. also c/o chest pain.   No need for 1:1, no psychiatric admission needed  would start Remeron 7.5mg hs for sleep and appetite, as long as there is no contraindication   check b12, folate, TSH, vit D.   support/reassurance

## 2017-10-02 NOTE — PROGRESS NOTE ADULT - PROBLEM SELECTOR PLAN 1
The left side pig tail catheter is placed: dark bloody fluid removed: Has significantly high LDH but ph is OK: Exudative fluid: there are no organisms growing in the fluid.  Patient is on broad spectrum antibiotics : ID is following  09/29/2017: exudative effusion ? all blood? negative cultures to date!!  09/30: cultures remain negative: antibiotics discontinued!!: The CHEST RADIOGRAPH YESTERDAY WAS CLEAR:  So far cause of abdominal pain is unclear: he has been on SC  10/02/2017: stable following removal of chest tube

## 2017-10-02 NOTE — PROGRESS NOTE ADULT - SUBJECTIVE AND OBJECTIVE BOX
Driveline: Driveline dressing intact last changed on 9/27/17  Equipment: We reviewed controller with 15 min back up battery, batteries, battery life. Power module with 30 min back up battery. Patient moved from batteries to power module.  Alarms: we reviewed battery alarms today.    All questions answered.    Patient needs to change from batteries to power module and back again much more frequently.   He needs to review alarms and what to do when they occur.  We need to have the niece come in and do dressing at least one more time.  Will continue with education tomorrow

## 2017-10-02 NOTE — PROGRESS NOTE ADULT - SUBJECTIVE AND OBJECTIVE BOX
I am responsible for myself    VITAL SIGNS    Telemetry:  NSR     Vital Signs Last 24 Hrs  T(C): 36.8 (10-02-17 @ 11:00), Max: 37 (10-01-17 @ 18:54)  T(F): 98.3 (10-02-17 @ 11:00), Max: 98.6 (10-01-17 @ 18:54)  HR: 98 (10-02-17 @ 11:00) (82 - 98)  BP: --  76-84  RR: 17 (10-02-17 @ 11:00) (16 - 18)  SpO2: 100% (10-02-17 @ 11:00) (99% - 100%)                   10-01 @ 07:01  -  10-02 @ 07:00  --------------------------------------------------------  IN: 730 mL / OUT: 2150 mL / NET: -1420 mL    10-02 @ 07:  -  10-02 @ 12:05  --------------------------------------------------------  IN: 480 mL / OUT: 300 mL / NET: 180 mL          Daily     Daily Weight in k.3 (02 Oct 2017 09:00)        CAPILLARY BLOOD GLUCOSE                      Coumadin    [ ] YES          [  ]      NO         Reason:                               PHYSICAL EXAM        Neurology: alert and oriented x 3, nonfocal, no gross deficits  CV : S1 S2 , RRR   Sternal Wound :  CDI , Stable  Lungs: cta    Abdomen: soft, nontender, nondistended, positive bowel sounds, last bowel movement ,   Extremities:       - edema, negative calve tenderness,           aspirin enteric coated 81 milliGRAM(s) Oral daily  pantoprazole    Tablet 40 milliGRAM(s) Oral before breakfast  senna 2 Tablet(s) Oral at bedtime  oxyCODONE  ER Tablet 10 milliGRAM(s) Oral every 12 hours  lisinopril 5 milliGRAM(s) Oral daily  lidocaine   Patch 1 Patch Transdermal daily  warfarin 6 milliGRAM(s) Oral once                    Physical Therapy Rec:   Home  [  ]   Home w/ PT  [  ]  Rehab  [  ]  Discussed with Cardiothoracic Team at AM rounds.

## 2017-10-02 NOTE — BEHAVIORAL HEALTH ASSESSMENT NOTE - NSBHSUICPROTECTFACT_PSY_A_CORE
Identifies reasons for living/Supportive social network or family/High spirituality/Responsibility to family and others/Future oriented

## 2017-10-02 NOTE — PROGRESS NOTE ADULT - ASSESSMENT
Endorsed low mood, poor sleep, poor appetite, low energy in setting of ongoing pain and hospitalization.  Lives with his sister and her family but they have been in [place] since 9/22/17 and will not be returning until 10/22/17.    His neice is an RN at [employer] but has her own family and works full time.  Reports anhedonia.   Deneis s/i.   No PPH.  Endorsed ongoing pain at surgical site.  Treated with lidocaine patch and   Ambulating in [place] with walker and assistance without distress.         DX: Adjustment disorder with anxiety/depression     Recommendations:  Psych consult for medication assessment   Will benefit from additional coping strategies to manage current stressors   Behavioral Cardiology will continue to follow

## 2017-10-02 NOTE — PROGRESS NOTE ADULT - SUBJECTIVE AND OBJECTIVE BOX
Behavioral Cardiology Progress Note     HPI:   Mr. Quispe is a 62 year old man with a nonischemic cardiomyopathy with ACC/AHA stage D congestive heart failure who presented with abdominal pain due to cardiogenic shock, now s/p Heart Mate II LVAD with TV annuloplasty ring on 9/12. His postoperative course was complicated by bleeding requiring transfusions, which has stabilized He currently is euvolemic without evidence of right heart failure. his MAPs are at goal. Pt is s/p thoracentesis and chest tube placement 9/27 s/p removal    Behavioral Health Assessment:     Mood:  "no one comes to see me."           Current stressors:   Adjustment to LVAD   Hospitalization    Pain management     Support system/family support:   Family support (family members -sister and her family) are currently in [facility] until 10/22/17.  Pt’s niece is an RN at [employer].   Family from [place] visited yesterday.      Coping strategies:    Limited; reports he tends to keep things to himself, reluctant to ask others for help.        Understanding of medical illness and treatment plan:   Continues with process of LVAD education with LVAD coordinator.        MSE:   Seen resting in bed.  A&Ox3.  Fairly related with intermittent eye contact, kept his eyes closed for periods during assessment. Thought process goal directed.  No abnormal thought content, denies s/i.  Mood: dysphoric.  Affect anxious.  Insight and judgment adequate.         .

## 2017-10-02 NOTE — PROGRESS NOTE ADULT - ATTENDING COMMENTS
Briefly, 61 y/o Creole speaking male who presented with abdominal pain and was found to be in low output heart failure with cardiogenic shock (EF 10%) underwent LVAD/TV annuloplasty for INTERMACS 2 9/12; postop had bleeding requiring PRBC, amicar, PLT, cryo. TTE 9/13 LVEDD 5.9 cm, mild-mod MR, aortic valve closed, mild RV dysfunction, mild TR. Hospital course c/b significant L sided pain; found to have R hemithorax s/p pigtail that was removed; fluid c/w exudative with lot of RBC, culture negative. Continues to have pain and frustration. Currently looks euvolemic. LVAD interrogated w/o events. Labs reviewed - INR 2.1, . Na improving with free water restriction. Currently compensated, stage D HF requiring HM2 as bridge to recovery.   - on lisinopril 5 mg daily; continue for now  - off diuretics; standing weights daily  - ambulate as tolerated  - continue coumadin 6 mg and ASA 81 mg daily  - appreciate psych input  - dispo planning in process; LVAD education

## 2017-10-02 NOTE — BEHAVIORAL HEALTH ASSESSMENT NOTE - NSBHCHARTREVIEWVS_PSY_A_CORE FT
Vital Signs Last 24 Hrs  T(C): 36.5 (02 Oct 2017 15:00), Max: 37 (01 Oct 2017 18:54)  T(F): 97.7 (02 Oct 2017 15:00), Max: 98.6 (01 Oct 2017 18:54)  HR: 97 (02 Oct 2017 15:00) (85 - 98)  BP: --  BP(mean): 74 (02 Oct 2017 15:00) (74 - 84)  RR: 17 (02 Oct 2017 15:00) (17 - 18)  SpO2: 99% (02 Oct 2017 15:00) (99% - 100%)

## 2017-10-02 NOTE — BEHAVIORAL HEALTH ASSESSMENT NOTE - RISK ASSESSMENT
Moderate risk. Risk factors: multiple chronic unmodifiable risk factors including Male gender, age >60, currently hospitalized with a debilitating medical condition, family away, currently depressed, Protective factors: willing to seek help, compliant with medications, has supportive family, is spiritual, denies active SI/HI and no history of aggression or violence.

## 2017-10-02 NOTE — BEHAVIORAL HEALTH ASSESSMENT NOTE - NSBHCHARTREVIEWLAB_PSY_A_CORE FT
10-02    130<L>  |  92<L>  |  14  ----------------------------<  119<H>  4.8   |  28  |  0.60    Ca    9.7      02 Oct 2017 05:26

## 2017-10-03 LAB
ANION GAP SERPL CALC-SCNC: 13 MMOL/L — SIGNIFICANT CHANGE UP (ref 5–17)
BUN SERPL-MCNC: 14 MG/DL — SIGNIFICANT CHANGE UP (ref 7–23)
CALCIUM SERPL-MCNC: 9.3 MG/DL — SIGNIFICANT CHANGE UP (ref 8.4–10.5)
CHLORIDE SERPL-SCNC: 89 MMOL/L — LOW (ref 96–108)
CHLORIDE UR-SCNC: 90 MMOL/L — SIGNIFICANT CHANGE UP
CO2 SERPL-SCNC: 25 MMOL/L — SIGNIFICANT CHANGE UP (ref 22–31)
CREAT ?TM UR-MCNC: 191 MG/DL — SIGNIFICANT CHANGE UP
CREAT SERPL-MCNC: 0.6 MG/DL — SIGNIFICANT CHANGE UP (ref 0.5–1.3)
GLUCOSE SERPL-MCNC: 113 MG/DL — HIGH (ref 70–99)
HCT VFR BLD CALC: 29.6 % — LOW (ref 39–50)
HGB BLD-MCNC: 9.9 G/DL — LOW (ref 13–17)
INR BLD: 2.11 RATIO — HIGH (ref 0.88–1.16)
LDH SERPL L TO P-CCNC: 379 U/L — HIGH (ref 50–242)
MCHC RBC-ENTMCNC: 32.4 PG — SIGNIFICANT CHANGE UP (ref 27–34)
MCHC RBC-ENTMCNC: 33.4 GM/DL — SIGNIFICANT CHANGE UP (ref 32–36)
MCV RBC AUTO: 96.9 FL — SIGNIFICANT CHANGE UP (ref 80–100)
OSMOLALITY UR: 784 MOS/KG — SIGNIFICANT CHANGE UP (ref 300–900)
PLATELET # BLD AUTO: 361 K/UL — SIGNIFICANT CHANGE UP (ref 150–400)
POTASSIUM SERPL-MCNC: 5 MMOL/L — SIGNIFICANT CHANGE UP (ref 3.5–5.3)
POTASSIUM SERPL-SCNC: 5 MMOL/L — SIGNIFICANT CHANGE UP (ref 3.5–5.3)
POTASSIUM UR-SCNC: >100 MMOL/L — SIGNIFICANT CHANGE UP
PROTHROM AB SERPL-ACNC: 23.4 SEC — HIGH (ref 9.8–12.7)
RBC # BLD: 3.05 M/UL — LOW (ref 4.2–5.8)
RBC # FLD: 13.5 % — SIGNIFICANT CHANGE UP (ref 10.3–14.5)
SODIUM SERPL-SCNC: 127 MMOL/L — LOW (ref 135–145)
SODIUM UR-SCNC: 112 MMOL/L — SIGNIFICANT CHANGE UP
WBC # BLD: 10.8 K/UL — HIGH (ref 3.8–10.5)
WBC # FLD AUTO: 10.8 K/UL — HIGH (ref 3.8–10.5)

## 2017-10-03 PROCEDURE — 93750 INTERROGATION VAD IN PERSON: CPT

## 2017-10-03 PROCEDURE — 99233 SBSQ HOSP IP/OBS HIGH 50: CPT | Mod: 25

## 2017-10-03 PROCEDURE — 99231 SBSQ HOSP IP/OBS SF/LOW 25: CPT

## 2017-10-03 RX ORDER — OXYCODONE HYDROCHLORIDE 5 MG/1
5 TABLET ORAL EVERY 6 HOURS
Qty: 0 | Refills: 0 | Status: DISCONTINUED | OUTPATIENT
Start: 2017-10-03 | End: 2017-10-04

## 2017-10-03 RX ORDER — SODIUM CHLORIDE 9 MG/ML
500 INJECTION INTRAMUSCULAR; INTRAVENOUS; SUBCUTANEOUS ONCE
Qty: 0 | Refills: 0 | Status: COMPLETED | OUTPATIENT
Start: 2017-10-03 | End: 2017-10-03

## 2017-10-03 RX ORDER — WARFARIN SODIUM 2.5 MG/1
6 TABLET ORAL ONCE
Qty: 0 | Refills: 0 | Status: COMPLETED | OUTPATIENT
Start: 2017-10-03 | End: 2017-10-03

## 2017-10-03 RX ORDER — OXYCODONE HYDROCHLORIDE 5 MG/1
10 TABLET ORAL EVERY 12 HOURS
Qty: 0 | Refills: 0 | Status: DISCONTINUED | OUTPATIENT
Start: 2017-10-03 | End: 2017-10-10

## 2017-10-03 RX ADMIN — LISINOPRIL 5 MILLIGRAM(S): 2.5 TABLET ORAL at 06:20

## 2017-10-03 RX ADMIN — WARFARIN SODIUM 6 MILLIGRAM(S): 2.5 TABLET ORAL at 22:03

## 2017-10-03 RX ADMIN — SENNA PLUS 2 TABLET(S): 8.6 TABLET ORAL at 22:03

## 2017-10-03 RX ADMIN — OXYCODONE HYDROCHLORIDE 10 MILLIGRAM(S): 5 TABLET ORAL at 03:54

## 2017-10-03 RX ADMIN — OXYCODONE HYDROCHLORIDE 10 MILLIGRAM(S): 5 TABLET ORAL at 18:28

## 2017-10-03 RX ADMIN — OXYCODONE HYDROCHLORIDE 10 MILLIGRAM(S): 5 TABLET ORAL at 19:15

## 2017-10-03 RX ADMIN — OXYCODONE HYDROCHLORIDE 10 MILLIGRAM(S): 5 TABLET ORAL at 04:30

## 2017-10-03 RX ADMIN — Medication 81 MILLIGRAM(S): at 13:28

## 2017-10-03 RX ADMIN — MIRTAZAPINE 7.5 MILLIGRAM(S): 45 TABLET, ORALLY DISINTEGRATING ORAL at 22:03

## 2017-10-03 RX ADMIN — SODIUM CHLORIDE 100 MILLILITER(S): 9 INJECTION INTRAMUSCULAR; INTRAVENOUS; SUBCUTANEOUS at 14:18

## 2017-10-03 RX ADMIN — PANTOPRAZOLE SODIUM 40 MILLIGRAM(S): 20 TABLET, DELAYED RELEASE ORAL at 06:20

## 2017-10-03 NOTE — PROGRESS NOTE ADULT - ASSESSMENT
63 yo male with no PMHx presented with sob and abdominal pain  on 8/25/17.  Cta - for PE,  cardiac mRI and cath revealed   lv dysfunction and MR.  Cardiology 1. Daily Shower  2. Weight yourself daily and notify any weight gain greater than 2-3 pounds in 24 hours.  3. Regular diet - low fat, low cholesterol, no added salt.  4. Cleanse Midsternal incision and leg incision daily while showering with warm water and mild soap, pat dry and maintain open to air.   5. Follow Cardiac Surgery Do's and Don'ts discharge instructions.   6. No driving until cleared by MD.   7. No heavy lifting nothing greater than 5 pounds until cleared by MD.   8. Call / Notify MD any fever greater than 101.0  9. Increase Activity as tolerated.as consulted, HF service consulted and the pt was placed in ccu with pulmonary edema. He had  low cardiac output and cardiogenic shock, and was  referred  for LVAD evaluation. ID was consulted to clear the patient for surgery.. GI was following for abd pain, no significant findings on virtual colonoscopy. He was treated for chf  with  afterload reduction  and milrinone and stabilized, euvolemic .  9/12/17 S/P Implantation of HeartMate II left ventricular assist device through median sternotomy, and repair of the tricuspid valve with a number 28 classic Christianson ring.  Post op he required inotropic support and PRBc's for acute blood loss anemia  He was transferred to sdu  9/18  He has c/o pain at the incision site and chest tube site  Chest tube to be d/c  9/20  Pain improved .  9/21 INR drifted to 1.8, Heparin started.  Lisinopril increased for BP and afterload  Leukocytosis, afebrile  Hyponatremia, off lasix  9/23 Leukocytosis WBC 19  CT chest/ab/plevis ordered  blood cx x2  ID consulted fluid bolus .9  per Dr Iyer low sodium, diet d/c regular diet ordered INR 2.08 heparin gtt d/c coumadin 5 tonight  9/24 cta done 2nd limited study prior.  Abdominal discomfort relieved with pain meds, d/w pain service, oxycontin recommended .  Ct scan with contrast reviewed by Dr Cadet.  Pt with loculated LLL effusion vs atelectasis, wbc improving but remains on antibiotics.  The coumadin was low dosed , awaiting IR tap of the loculated effusion and cultures to be sent.  Low map, ivf and decrease lisinopril  The IR procedure was cancelled by IR yesterday and he is still awaiting the thoracentisis.  The INR was below 2.0, heparin started.  9/27 pigtail placed in IR>300cc  with cultures neg to date.  F/u cultures, chest tube to water seal today , possible d/c later today.  9/30 Heparin Gtt  coumadin dosed daily ambulated today  10/2 transfer to floor  Increase ambulation  Cardiology f/u

## 2017-10-03 NOTE — PROGRESS NOTE ADULT - PROBLEM SELECTOR PLAN 1
daily LDH   on going education   continue coumadin daily LDH   on going education   continue coumadin 6mg tonight   post op generalized pain , low appetite , low mood, poor sleep   started on remeron   give 500cc ns over 5 hrs today   repeat u/a

## 2017-10-03 NOTE — PROGRESS NOTE ADULT - SUBJECTIVE AND OBJECTIVE BOX
Subjective: Pt states "I'm ok" denies any CP, SOB, N/V and palpitations. No acute events overnight.     Telemetry: SR 90 - 100   Vital Signs Last 24 Hrs  T(F): 97.7, Max: 98.8   HR: 94   RR: 18   SpO2: 98%  RA      10-02 @ 07:01  -  10-03 @ 07:00  --------------------------------------------------------  IN: 820 mL / OUT: 2000 mL / NET: -1180 mL    Daily Weight in k.5 (03 Oct 2017 11:04)    PHYSICAL EXAM  Neurology: A&Ox3, nonfocal, no gross deficits  CV: +LVAD hum  Sternal Wound: MSI CDI TAI, Stable  Lungs: Respirations non-labored, B/L BS CTA  Abdomen: Soft, NT/ND, +BSx4Q, last BM 10/2  (-)N/V/D  : Voiding without difficulty  Extremities: B/L LE trace edema, negative calf tenderness, +PP           MEDICATIONS  aspirin enteric coated 81 milliGRAM(s) Oral daily  lidocaine   Patch 1 Patch Transdermal daily  lisinopril 5 milliGRAM(s) Oral daily  mirtazapine 7.5 milliGRAM(s) Oral at bedtime  oxyCODONE    IR 5 milliGRAM(s) Oral every 6 hours PRN  oxyCODONE  ER Tablet 10 milliGRAM(s) Oral every 12 hours  pantoprazole    Tablet 40 milliGRAM(s) Oral before breakfast  senna 2 Tablet(s) Oral at bedtime  warfarin 6 milliGRAM(s) Oral once    Physical Therapy Rec:   Rehab     Discussed with Cardiothoracic Team at AM rounds.

## 2017-10-03 NOTE — PROGRESS NOTE ADULT - ASSESSMENT
62M with a nonischemic cardiomyopathy with ACC/AHA stage D congestive heart failure who presented with abdominal pain due to cardiogenic shock, now s/p Heart Mate II LVAD with TV annuloplasty ring on 9/12. His postoperative course was complicated by bleeding requiring transfusions, which has stabilized He currently is euvolemic without evidence of right heart failure. his MAPs are at goal. Pt is s/p thoracentesis and chest tube placement 9/27 s/p removal  9/28  Pt sitting in chair mild JVD 62M with a nonischemic cardiomyopathy with ACC/AHA stage D congestive heart failure who presented with abdominal pain due to cardiogenic shock, now s/p Heart Mate II LVAD with TV annuloplasty ring on 9/12. His postoperative course was complicated by bleeding requiring transfusions, which has stabilized He currently is euvolemic without evidence of right heart failure. his MAPs are at goal. Pt is s/p thoracentesis and chest tube placement 9/27 s/p removal  9/28  Pt sitting in chair low  JVD  one PI event to 3.8 with sodium trending down from 130 to 127 . Pt continue to have some discomfort improving slightly , reports poor sleep and lack of appetite

## 2017-10-03 NOTE — PROGRESS NOTE ADULT - SUBJECTIVE AND OBJECTIVE BOX
Behavioral Cardiology Progress Note     HPI:   Mr. Quispe is a 62 year old man with a nonischemic cardiomyopathy with ACC/AHA stage D congestive heart failure who presented with abdominal pain due to cardiogenic shock, now s/p Heart Mate II LVAD with TV annuloplasty ring on 9/12. His postoperative course was complicated by bleeding requiring transfusions, which has stabilized He currently is euvolemic without evidence of right heart failure. his MAPs are at goal. Pt is s/p thoracentesis and chest tube placement 9/27 s/p removal    Behavioral Health Assessment:     Mood:  "I'm tired."           Current stressors:   Adjustment to LVAD   Hospitalization    Pain management     Support system/family support:   Family support (family members -sister and her family) are currently in [facility] until 10/22/17.  Pt’s niece is an RN at [employer].   Family from [place] visited yesterday.      Coping strategies:    Limited; reports he tends to keep things to himself, reluctant to ask others for help.        Understanding of medical illness and treatment plan:   Continues with process of LVAD education with LVAD coordinator.        MSE:   Seen resting in bed.  A&Ox3.  Fairly related with intermittent eye contact, kept his eyes closed for periods during assessment. Thought process goal directed.  No abnormal thought content, denies s/i.  Mood: dysphoric.  Affect anxious.  Insight and judgment adequate.         .

## 2017-10-03 NOTE — PROGRESS NOTE ADULT - ATTENDING COMMENTS
Briefly, 61 y/o Creole speaking male who presented with abdominal pain and was found to be in low output heart failure with cardiogenic shock (EF 10%) underwent LVAD/TV annuloplasty for INTERMACS 2 9/12; postop had bleeding requiring PRBC, amicar, PLT, cryo. TTE 9/13 LVEDD 5.9 cm, mild-mod MR, aortic valve closed, mild RV dysfunction, mild TR. Hospital course c/b significant L sided pain; found to have R hemithorax s/p pigtail that was removed; fluid c/w exudative with lot of RBC, culture negative. Continues to have pain in L flank and frustration. Currently looks hypovolemic. LVAD interrogated w/o events. Labs reviewed - INR 2.1, . Na downtrending. INR 2.1. Currently compensated, stage D HF requiring HM2 as bridge to recovery.   - on lisinopril 5 mg daily; continue for now  - off diuretics; would gently hydrate with 100 cc/hr x5 hours as pt appears volume down which may be causing hyponatremia  - ambulate as tolerated  - flank pain and pt reports dysuria; send UA to evaluate for UTI vs. pyelonephritis  - continue coumadin 6 mg and ASA 81 mg daily  - appreciate psych input  - dispo planning in process; LVAD education

## 2017-10-03 NOTE — PROGRESS NOTE ADULT - ASSESSMENT
Continues to endorse low mood, poor sleep, poor appetite, low energy in setting of ongoing pain and hospitalization. Seen by psychiatry yesterday and started on Remeron 7.5mg at bedtime. Lives with his sister and her family but they have been in [place] since 9/22/17 and will not be returning until 10/22/17.    His niece is an RN at [employer] but has her own family and works full time.  Reports anhedonia.   Deneis s/i.   No PPH.  Endorsed ongoing pain at surgical site; managed with medication.  Ambulating in an[place] with walker and assistance without distress.        DX: Adjustment disorder with anxiety/depression     Recommendations:  Will benefit from additional coping strategies to manage current stressors   Behavioral Cardiology will continue to follow Was more verbal today, expressing some of his fears and concerns.    Worried about going home "with no one there" as his family is in [place] until 10/22/17.  Reviewed plan for discharge to subaccute rehab following discharge.  Also talked about financial concerns; reports he needs to pay rent each month and has no means of income as he is not working due to health issues.  Interested in speaking to SW about disability.   Continues to endorse low mood, poor sleep, poor appetite, low energy in setting of ongoing pain and hospitalization. Reports anhedonia.   Denies s/i.  Seen by psychiatry yesterday and started on Remeron 7.5mg at bedtime. No PPH.  Endorsed ongoing pain at surgical site; managed with medication.  Ambulating in [place] with walker and assistance without distress.          DX: Adjustment disorder with anxiety/depression     Recommendations:  Will benefit from additional coping strategies to manage current stressors   Behavioral Cardiology will continue to follow

## 2017-10-03 NOTE — PROGRESS NOTE ADULT - SUBJECTIVE AND OBJECTIVE BOX
INTERVAL HPI/OVERNIGHT EVENTS: no new events    HPI:  62M with no PMHx here with chest pain. Began last week, described as pleuritic, worsens with cough and deep inspiration. Has been having SOB as well, dry cough. Recently vacationed in [facility] came back today. Saw doctor in [facility] but unclear what workup was. No fever, sick contacts, abd pain. CP described as midsternal, nonradiating. Has 25 pack year smoking history.  CTPA does not reveal PE, Pulm congestion noted.   No new overnight event.  No N/V/D.  Tolerating diet.    MEDICATIONS  (STANDING):  aspirin enteric coated 81 milliGRAM(s) Oral daily  docusate sodium 100 milliGRAM(s) Oral three times a day  polyethylene glycol 3350 17 Gram(s) Oral daily  pantoprazole    Tablet 40 milliGRAM(s) Oral before breakfast  lactobacillus acidophilus and bulgaricus Chewable 1 Tablet(s) Chew three times a day  sorbitol 70% Solution 30 milliLiter(s) Oral once  senna 2 Tablet(s) Oral at bedtime  oxyCODONE  ER Tablet 10 milliGRAM(s) Oral every 12 hours  lisinopril 5 milliGRAM(s) Oral daily  acetaminophen   Tablet. 975 milliGRAM(s) Oral every 8 hours  lidocaine   Patch 1 Patch Transdermal daily  warfarin 6 milliGRAM(s) Oral once    MEDICATIONS  (PRN):  bisacodyl Suppository 10 milliGRAM(s) Rectal daily PRN Constipation      Allergies    No Known Allergies    Intolerances          General:  No wt loss, fevers, chills, night sweats, fatigue,   Eyes:  Good vision, no reported pain  ENT:  No sore throat, pain, runny nose, dysphagia  CV:  No pain, palpitations, hypo/hypertension  Resp:  No dyspnea, cough, tachypnea, wheezing  GI:  No pain, No nausea, No vomiting, No diarrhea, No constipation, No weight loss, No fever, No pruritis, No rectal bleeding, No tarry stools, No dysphagia,  :  No pain, bleeding, incontinence, nocturia  Muscle:  No pain, weakness  Neuro:  No weakness, tingling, memory problems  Psych:  No fatigue, insomnia, mood problems, depression  Endocrine:  No polyuria, polydipsia, cold/heat intolerance  Heme:  No petechiae, ecchymosis, easy bruisability  Skin:  No rash, tattoos, scars, edema      PHYSICAL EXAM:   Vital Signs:  Vital Signs Last 24 Hrs  T(C): 36.7 (01 Oct 2017 13:00), Max: 36.8 (30 Sep 2017 18:00)  T(F): 98.1 (01 Oct 2017 13:00), Max: 98.3 (30 Sep 2017 18:58)  HR: 82 (01 Oct 2017 13:00) (73 - 91)  BP: --  BP(mean): 76 (01 Oct 2017 13:00) (72 - 84)  RR: 16 (01 Oct 2017 13:00) (16 - 18)  SpO2: 99% (01 Oct 2017 13:00) (98% - 100%)  Daily     Daily I&O's Summary    30 Sep 2017 07:01  -  01 Oct 2017 07:00  --------------------------------------------------------  IN: 1524 mL / OUT: 2450 mL / NET: -926 mL    01 Oct 2017 07:01  -  01 Oct 2017 15:36  --------------------------------------------------------  IN: 480 mL / OUT: 500 mL / NET: -20 mL        GENERAL:  Appears stated age, well-groomed, well-nourished, no distress  HEENT:  NC/AT,  conjunctivae clear and pink, no thyromegaly, nodules, adenopathy, no JVD, sclera -anicteric  CHEST:  Full & symmetric excursion, no increased effort, breath sounds clear  HEART:  Regular rhythm, S1, S2, no murmur/rub/S3/S4, no abdominal bruit, no edema  ABDOMEN:  Soft, non-tender, non-distended, normoactive bowel sounds,  no masses ,no hepato-splenomegaly, no signs of chronic liver disease  EXTEREMITIES:  no cyanosis,clubbing or edema  SKIN:  No rash/erythema/ecchymoses/petechiae/wounds/abscess/warm/dry  NEURO:  Alert, oriented, no asterixis, no tremor, no encephalopathy      LABS:                        8.9    11.0  )-----------( 389      ( 01 Oct 2017 01:13 )             26.6     10-01    128<L>  |  91<L>  |  15  ----------------------------<  178<H>  5.0   |  26  |  0.62    Ca    9.3      01 Oct 2017 01:13      PT/INR - ( 01 Oct 2017 01:13 )   PT: 22.7 sec;   INR: 2.05 ratio         PTT - ( 01 Oct 2017 01:13 )  PTT:103.0 sec    amylase   lipase  RADIOLOGY & ADDITIONAL TESTS:

## 2017-10-03 NOTE — PROGRESS NOTE ADULT - SUBJECTIVE AND OBJECTIVE BOX
Subjective: pt sitting in chair Pt reports discomfort     Medications:  aspirin enteric coated 81 milliGRAM(s) Oral daily  lidocaine   Patch 1 Patch Transdermal daily  lisinopril 5 milliGRAM(s) Oral daily  mirtazapine 7.5 milliGRAM(s) Oral at bedtime  oxyCODONE    IR 5 milliGRAM(s) Oral every 6 hours PRN  oxyCODONE  ER Tablet 10 milliGRAM(s) Oral every 12 hours  pantoprazole    Tablet 40 milliGRAM(s) Oral before breakfast  senna 2 Tablet(s) Oral at bedtime  warfarin 6 milliGRAM(s) Oral once      PHYSICAL EXAM:  Vital Signs Last 24 Hrs  T(C): 36.5 (03 Oct 2017 10:00), Max: 37.1 (02 Oct 2017 20:14)  T(F): 97.7 (03 Oct 2017 10:00), Max: 98.8 (02 Oct 2017 20:14)  HR: 94 (03 Oct 2017 10:00) (94 - 98)  BP: --  BP(mean): 66 (03 Oct 2017 11:47) (66 - 80)  RR: 18 (03 Oct 2017 10:00) (17 - 18)  SpO2: 98% (03 Oct 2017 10:00) (98% - 100%)  Daily     Daily Weight in k.5 (03 Oct 2017 11:04)  I&O's Detail    02 Oct 2017 07:01  -  03 Oct 2017 07:00  --------------------------------------------------------  IN:    Oral Fluid: 820 mL  Total IN: 820 mL    OUT:    Voided: 2000 mL  Total OUT: 2000 mL    Total NET: -1180 mL          General: A/ox 3, No acute Distress  Neck: Supple, mild JVD  Cardiac: S1 S2, No M/R/G  Pulmonary: CTAB, Breathing unlabored, No Rhonchi/Rales/Wheezing  Abdomen: Soft, Non -tender, +BS   Extremities: No Rashes, No edema  LVAD Interrogation:  Pump Flow:5.5  Pump Speed:9000  Pulse Index:5.6  Pump Power:6.5  VAD Events: one PI event   Driveline evaluation: clean and dry    Programming Changes: [ ] No changes made         LABS:  cret                        9.9    10.8  )-----------( 361      ( 03 Oct 2017 05:33 )             29.6     10-03    127<L>  |  89<L>  |  14  ----------------------------<  113<H>  5.0   |  25  |  0.60    Ca    9.3      03 Oct 2017 05:33      PT/INR - ( 03 Oct 2017 05:33 )   PT: 23.4 sec;   INR: 2.11 ratio        Subjective: pt sitting in chair Pt reports discomfort     Medications:  aspirin enteric coated 81 milliGRAM(s) Oral daily  lidocaine   Patch 1 Patch Transdermal daily  lisinopril 5 milliGRAM(s) Oral daily  mirtazapine 7.5 milliGRAM(s) Oral at bedtime  oxyCODONE    IR 5 milliGRAM(s) Oral every 6 hours PRN  oxyCODONE  ER Tablet 10 milliGRAM(s) Oral every 12 hours  pantoprazole    Tablet 40 milliGRAM(s) Oral before breakfast  senna 2 Tablet(s) Oral at bedtime  warfarin 6 milliGRAM(s) Oral once      Vital Signs Last 24 Hrs  T(C): 36.5 (03 Oct 2017 10:00), Max: 37.1 (02 Oct 2017 20:14)  T(F): 97.7 (03 Oct 2017 10:00), Max: 98.8 (02 Oct 2017 20:14)  HR: 94 (03 Oct 2017 10:00) (94 - 98)  BP(mean): 66 (03 Oct 2017 11:47) (66 - 80)  RR: 18 (03 Oct 2017 10:00) (17 - 18)  SpO2: 98% (03 Oct 2017 10:00) (98% - 100%)  Daily Weight in k.5 (03 Oct 2017 11:04)  I&O's Detail    02 Oct 2017 07:01  -  03 Oct 2017 07:00  --------------------------------------------------------  IN:    Oral Fluid: 820 mL  Total IN: 820 mL    OUT:    Voided: 2000 mL  Total OUT: 2000 mL    Total NET: -1180 mL      General: A/ox 3, No acute Distress  Neck: Supple, mild JVD  Cardiac: S1 S2, No M/R/G  Pulmonary: CTAB, Breathing unlabored, No Rhonchi/Rales/Wheezing  Abdomen: Soft, Non -tender, +BS   Extremities: No Rashes, No edema  LVAD Interrogation:  Pump Flow:5.5  Pump Speed:9000  Pulse Index:5.6  Pump Power:6.5  VAD Events: one PI event   Driveline evaluation: clean and dry    Programming Changes: [ ] No changes made                               9.9    10.8  )-----------( 361      ( 03 Oct 2017 05:33 )             29.6     10-03    127<L>  |  89<L>  |  14  ----------------------------<  113<H>  5.0   |  25  |  0.60    Ca    9.3      03 Oct 2017 05:33      PT/INR - ( 03 Oct 2017 05:33 )   PT: 23.4 sec;   INR: 2.11 ratio

## 2017-10-04 LAB
ANION GAP SERPL CALC-SCNC: 11 MMOL/L — SIGNIFICANT CHANGE UP (ref 5–17)
ANION GAP SERPL CALC-SCNC: 12 MMOL/L — SIGNIFICANT CHANGE UP (ref 5–17)
APPEARANCE UR: ABNORMAL
BILIRUB UR-MCNC: NEGATIVE — SIGNIFICANT CHANGE UP
BUN SERPL-MCNC: 20 MG/DL — SIGNIFICANT CHANGE UP (ref 7–23)
BUN SERPL-MCNC: 24 MG/DL — HIGH (ref 7–23)
CALCIUM SERPL-MCNC: 9.4 MG/DL — SIGNIFICANT CHANGE UP (ref 8.4–10.5)
CALCIUM SERPL-MCNC: 9.7 MG/DL — SIGNIFICANT CHANGE UP (ref 8.4–10.5)
CHLORIDE SERPL-SCNC: 89 MMOL/L — LOW (ref 96–108)
CHLORIDE SERPL-SCNC: 89 MMOL/L — LOW (ref 96–108)
CO2 SERPL-SCNC: 25 MMOL/L — SIGNIFICANT CHANGE UP (ref 22–31)
CO2 SERPL-SCNC: 25 MMOL/L — SIGNIFICANT CHANGE UP (ref 22–31)
COLOR SPEC: YELLOW — SIGNIFICANT CHANGE UP
CREAT SERPL-MCNC: 0.65 MG/DL — SIGNIFICANT CHANGE UP (ref 0.5–1.3)
CREAT SERPL-MCNC: 0.8 MG/DL — SIGNIFICANT CHANGE UP (ref 0.5–1.3)
DIFF PNL FLD: NEGATIVE — SIGNIFICANT CHANGE UP
GLUCOSE SERPL-MCNC: 107 MG/DL — HIGH (ref 70–99)
GLUCOSE SERPL-MCNC: 157 MG/DL — HIGH (ref 70–99)
GLUCOSE UR QL: NEGATIVE — SIGNIFICANT CHANGE UP
HCT VFR BLD CALC: 30.1 % — LOW (ref 39–50)
HGB BLD-MCNC: 9.9 G/DL — LOW (ref 13–17)
INR BLD: 2.11 RATIO — HIGH (ref 0.88–1.16)
KETONES UR-MCNC: NEGATIVE — SIGNIFICANT CHANGE UP
LDH SERPL L TO P-CCNC: 361 U/L — HIGH (ref 50–242)
LEUKOCYTE ESTERASE UR-ACNC: NEGATIVE — SIGNIFICANT CHANGE UP
MCHC RBC-ENTMCNC: 31.8 PG — SIGNIFICANT CHANGE UP (ref 27–34)
MCHC RBC-ENTMCNC: 32.9 GM/DL — SIGNIFICANT CHANGE UP (ref 32–36)
MCV RBC AUTO: 96.6 FL — SIGNIFICANT CHANGE UP (ref 80–100)
NITRITE UR-MCNC: NEGATIVE — SIGNIFICANT CHANGE UP
PH UR: 7 — SIGNIFICANT CHANGE UP (ref 5–8)
PLATELET # BLD AUTO: 341 K/UL — SIGNIFICANT CHANGE UP (ref 150–400)
POTASSIUM SERPL-MCNC: 5.2 MMOL/L — SIGNIFICANT CHANGE UP (ref 3.5–5.3)
POTASSIUM SERPL-MCNC: 5.3 MMOL/L — SIGNIFICANT CHANGE UP (ref 3.5–5.3)
POTASSIUM SERPL-SCNC: 5.2 MMOL/L — SIGNIFICANT CHANGE UP (ref 3.5–5.3)
POTASSIUM SERPL-SCNC: 5.3 MMOL/L — SIGNIFICANT CHANGE UP (ref 3.5–5.3)
PREALB SERPL-MCNC: 18 MG/DL — SIGNIFICANT CHANGE UP (ref 18–45)
PROT UR-MCNC: NEGATIVE — SIGNIFICANT CHANGE UP
PROTHROM AB SERPL-ACNC: 23.1 SEC — HIGH (ref 9.8–12.7)
RBC # BLD: 3.11 M/UL — LOW (ref 4.2–5.8)
RBC # FLD: 13.6 % — SIGNIFICANT CHANGE UP (ref 10.3–14.5)
SODIUM SERPL-SCNC: 125 MMOL/L — LOW (ref 135–145)
SODIUM SERPL-SCNC: 126 MMOL/L — LOW (ref 135–145)
SP GR SPEC: 1.02 — SIGNIFICANT CHANGE UP (ref 1.01–1.02)
UROBILINOGEN FLD QL: NEGATIVE — SIGNIFICANT CHANGE UP
WBC # BLD: 10.4 K/UL — SIGNIFICANT CHANGE UP (ref 3.8–10.5)
WBC # FLD AUTO: 10.4 K/UL — SIGNIFICANT CHANGE UP (ref 3.8–10.5)

## 2017-10-04 PROCEDURE — 99231 SBSQ HOSP IP/OBS SF/LOW 25: CPT

## 2017-10-04 PROCEDURE — 99222 1ST HOSP IP/OBS MODERATE 55: CPT | Mod: GC

## 2017-10-04 PROCEDURE — 99233 SBSQ HOSP IP/OBS HIGH 50: CPT | Mod: 25

## 2017-10-04 PROCEDURE — 93010 ELECTROCARDIOGRAM REPORT: CPT

## 2017-10-04 PROCEDURE — 93750 INTERROGATION VAD IN PERSON: CPT

## 2017-10-04 RX ORDER — GLYCERIN ADULT
1 SUPPOSITORY, RECTAL RECTAL EVERY 4 HOURS
Qty: 0 | Refills: 0 | Status: DISCONTINUED | OUTPATIENT
Start: 2017-10-04 | End: 2017-10-22

## 2017-10-04 RX ORDER — POLYETHYLENE GLYCOL 3350 17 G/17G
17 POWDER, FOR SOLUTION ORAL AT BEDTIME
Qty: 0 | Refills: 0 | Status: DISCONTINUED | OUTPATIENT
Start: 2017-10-04 | End: 2017-10-25

## 2017-10-04 RX ORDER — WARFARIN SODIUM 2.5 MG/1
6 TABLET ORAL ONCE
Qty: 0 | Refills: 0 | Status: COMPLETED | OUTPATIENT
Start: 2017-10-04 | End: 2017-10-04

## 2017-10-04 RX ORDER — METOPROLOL TARTRATE 50 MG
12.5 TABLET ORAL DAILY
Qty: 0 | Refills: 0 | Status: DISCONTINUED | OUTPATIENT
Start: 2017-10-04 | End: 2017-10-12

## 2017-10-04 RX ADMIN — MIRTAZAPINE 7.5 MILLIGRAM(S): 45 TABLET, ORALLY DISINTEGRATING ORAL at 21:50

## 2017-10-04 RX ADMIN — LIDOCAINE 1 PATCH: 4 CREAM TOPICAL at 11:57

## 2017-10-04 RX ADMIN — Medication 81 MILLIGRAM(S): at 11:57

## 2017-10-04 RX ADMIN — OXYCODONE HYDROCHLORIDE 5 MILLIGRAM(S): 5 TABLET ORAL at 16:32

## 2017-10-04 RX ADMIN — OXYCODONE HYDROCHLORIDE 5 MILLIGRAM(S): 5 TABLET ORAL at 16:02

## 2017-10-04 RX ADMIN — LISINOPRIL 5 MILLIGRAM(S): 2.5 TABLET ORAL at 06:30

## 2017-10-04 RX ADMIN — SENNA PLUS 2 TABLET(S): 8.6 TABLET ORAL at 21:50

## 2017-10-04 RX ADMIN — PANTOPRAZOLE SODIUM 40 MILLIGRAM(S): 20 TABLET, DELAYED RELEASE ORAL at 06:30

## 2017-10-04 RX ADMIN — LIDOCAINE 1 PATCH: 4 CREAM TOPICAL at 00:45

## 2017-10-04 RX ADMIN — Medication 12.5 MILLIGRAM(S): at 14:51

## 2017-10-04 RX ADMIN — WARFARIN SODIUM 6 MILLIGRAM(S): 2.5 TABLET ORAL at 21:50

## 2017-10-04 RX ADMIN — Medication 1 SUPPOSITORY(S): at 14:23

## 2017-10-04 NOTE — CONSULT NOTE ADULT - ATTENDING COMMENTS
I have seen this patient with the fellow and agree with their assessment and plan. In addition, this hyponatremia is there since the LVAD and slowly trending down but stable now at 124 range. The crt also has been trending down suggestive of too much water on board. Given LVAD and unable to get a sense of MAP, need to rule out adrenal insuff given his MS changes and hyponatremia and mild hyperkalemia. In addition, would check a TSH and FT3 and Ft4 given his last TSH pre surgery was 5 -6 range. Meanwhile would increase protein and salt intake and ok to use lasix if need be as it will help the hyponatremia. Urine lytes are more suggestive of euvoelmic hyponatremia.  Avoid any abx or drips in D5W.    D./w with NP in detail.    Vicente Orozco MD  Cell   Pager   Office  I have seen this patient with the fellow and agree with their assessment and plan. In addition, this hyponatremia is there since the LVAD and slowly trending down but stable now at 124 range. The crt also has been trending down suggestive of too much water on board. Given LVAD and unable to get a sense of MAP, need to rule out adrenal insuff given his MS changes and hyponatremia and mild hyperkalemia. In addition, would check a TSH and FT3 and Ft4 given his last TSH pre surgery was 5 -6 range. Meanwhile would increase protein and salt intake and ok to use lasix if need be as it will help the hyponatremia. Urine lytes are more suggestive of euvoelmic hyponatremia.  Avoid any abx or drips in D5W.   Obstruction(distal) can also cause this. Bladder scan if need be-aleman placement    D./w with NP in detail.    Vicente Orozco MD  Cell   Pager   Office

## 2017-10-04 NOTE — PROGRESS NOTE ADULT - SUBJECTIVE AND OBJECTIVE BOX
INTERVAL HPI/OVERNIGHT EVENTS: no new events    HPI:  62M with no PMHx here with chest pain. Began last week, described as pleuritic, worsens with cough and deep inspiration. Has been having SOB as well, dry cough. Recently vacationed in [facility] came back today. Saw doctor in [facility] but unclear what workup was. No fever, sick contacts, abd pain. CP described as midsternal, nonradiating. Has 25 pack year smoking history.  CTPA does not reveal PE, Pulm congestion noted.   No new overnight event.  No N/V/D.  Tolerating diet.    MEDICATIONS  (STANDING):  aspirin enteric coated 81 milliGRAM(s) Oral daily  docusate sodium 100 milliGRAM(s) Oral three times a day  polyethylene glycol 3350 17 Gram(s) Oral daily  pantoprazole    Tablet 40 milliGRAM(s) Oral before breakfast  lactobacillus acidophilus and bulgaricus Chewable 1 Tablet(s) Chew three times a day  sorbitol 70% Solution 30 milliLiter(s) Oral once  senna 2 Tablet(s) Oral at bedtime  oxyCODONE  ER Tablet 10 milliGRAM(s) Oral every 12 hours  lisinopril 5 milliGRAM(s) Oral daily  acetaminophen   Tablet. 975 milliGRAM(s) Oral every 8 hours  lidocaine   Patch 1 Patch Transdermal daily  warfarin 6 milliGRAM(s) Oral once    MEDICATIONS  (PRN):  bisacodyl Suppository 10 milliGRAM(s) Rectal daily PRN Constipation      Allergies    No Known Allergies    Intolerances          General:  No wt loss, fevers, chills, night sweats, fatigue,   Eyes:  Good vision, no reported pain  ENT:  No sore throat, pain, runny nose, dysphagia  CV:  No pain, palpitations, hypo/hypertension  Resp:  No dyspnea, cough, tachypnea, wheezing  GI:  No pain, No nausea, No vomiting, No diarrhea, No constipation, No weight loss, No fever, No pruritis, No rectal bleeding, No tarry stools, No dysphagia,  :  No pain, bleeding, incontinence, nocturia  Muscle:  No pain, weakness  Neuro:  No weakness, tingling, memory problems  Psych:  No fatigue, insomnia, mood problems, depression  Endocrine:  No polyuria, polydipsia, cold/heat intolerance  Heme:  No petechiae, ecchymosis, easy bruisability  Skin:  No rash, tattoos, scars, edema      PHYSICAL EXAM:   Vital Signs:  Vital Signs Last 24 Hrs  T(C): 36.7 (01 Oct 2017 13:00), Max: 36.8 (30 Sep 2017 18:00)  T(F): 98.1 (01 Oct 2017 13:00), Max: 98.3 (30 Sep 2017 18:58)  HR: 82 (01 Oct 2017 13:00) (73 - 91)  BP: --  BP(mean): 76 (01 Oct 2017 13:00) (72 - 84)  RR: 16 (01 Oct 2017 13:00) (16 - 18)  SpO2: 99% (01 Oct 2017 13:00) (98% - 100%)  Daily     Daily I&O's Summary    30 Sep 2017 07:01  -  01 Oct 2017 07:00  --------------------------------------------------------  IN: 1524 mL / OUT: 2450 mL / NET: -926 mL    01 Oct 2017 07:01  -  01 Oct 2017 15:36  --------------------------------------------------------  IN: 480 mL / OUT: 500 mL / NET: -20 mL        GENERAL:  Appears stated age, well-groomed, well-nourished, no distress  HEENT:  NC/AT,  conjunctivae clear and pink, no thyromegaly, nodules, adenopathy, no JVD, sclera -anicteric  CHEST:  Full & symmetric excursion, no increased effort, breath sounds clear  HEART:  Regular rhythm, S1, S2, no murmur/rub/S3/S4, no abdominal bruit, no edema  ABDOMEN:  Soft, non-tender, non-distended, normoactive bowel sounds,  no masses ,no hepato-splenomegaly, no signs of chronic liver disease  EXTEREMITIES:  no cyanosis,clubbing or edema  SKIN:  No rash/erythema/ecchymoses/petechiae/wounds/abscess/warm/dry  NEURO:  Alert, oriented, no asterixis, no tremor, no encephalopathy      LABS:                        8.9    11.0  )-----------( 389      ( 01 Oct 2017 01:13 )             26.6     10-01    128<L>  |  91<L>  |  15  ----------------------------<  178<H>  5.0   |  26  |  0.62    Ca    9.3      01 Oct 2017 01:13      PT/INR - ( 01 Oct 2017 01:13 )   PT: 22.7 sec;   INR: 2.05 ratio         PTT - ( 01 Oct 2017 01:13 )  PTT:103.0 sec    amylase   lipase  RADIOLOGY & ADDITIONAL TESTS:

## 2017-10-04 NOTE — PROGRESS NOTE ADULT - SUBJECTIVE AND OBJECTIVE BOX
Patient is a 62y old  Male who presents with a chief complaint of SOOB (25 Aug 2017 22:27)  still complaining of abdominal pain  he is doing ok from pulmonary point of view  no SOB   no cough  no phlegm    Any change in ROS:     MEDICATIONS  (STANDING):  aspirin enteric coated 81 milliGRAM(s) Oral daily  lidocaine   Patch 1 Patch Transdermal daily  lisinopril 5 milliGRAM(s) Oral daily  mirtazapine 7.5 milliGRAM(s) Oral at bedtime  oxyCODONE  ER Tablet 10 milliGRAM(s) Oral every 12 hours  pantoprazole    Tablet 40 milliGRAM(s) Oral before breakfast  senna 2 Tablet(s) Oral at bedtime    MEDICATIONS  (PRN):  oxyCODONE    IR 5 milliGRAM(s) Oral every 6 hours PRN Severe Pain (7 - 10), breakthrough    Vital Signs Last 24 Hrs  T(C): 36.5 (04 Oct 2017 10:00), Max: 36.9 (03 Oct 2017 18:00)  T(F): 97.7 (04 Oct 2017 10:00), Max: 98.4 (03 Oct 2017 18:00)  HR: 96 (04 Oct 2017 10:00) (90 - 98)  BP: --  BP(mean): 70 (04 Oct 2017 10:00) (66 - 80)  RR: 18 (04 Oct 2017 10:00) (18 - 18)  SpO2: 99% (04 Oct 2017 10:00) (98% - 100%)    I&O's Summary    03 Oct 2017 07:01  -  04 Oct 2017 07:00  --------------------------------------------------------  IN: 1430 mL / OUT: 1275 mL / NET: 155 mL          Physical Exam:   GENERAL: cachectic  HEENT: ALONSO/   Atraumatic, Normocephalic  ENMT: No tonsillar erythema, exudates, or enlargement; Moist mucous membranes, Good dentition, No lesions  NECK: Supple, No JVD, Normal thyroid  CHEST/LUNG: Clear to ausculation ; No rales, rhonchi, wheezing, or rubs  CVS: Regular rate and rhythm; No murmurs, rubs, or gallops  GI: : Soft, winces with some pressure n abdomen  NERVOUS SYSTEM:  Alert & Oriented X3  EXTREMITIES:  2+ Peripheral Pulses, No clubbing, cyanosis, or edema  LYMPH: No lymphadenopathy noted  SKIN: No rashes or lesions  ENDOCRINOLOGY: No Thyromegaly  PSYCH: Appropriate    Labs:                              9.9    10.4  )-----------( 341      ( 04 Oct 2017 06:02 )             30.1                         9.9    10.8  )-----------( 361      ( 03 Oct 2017 05:33 )             29.6                         9.8    10.1  )-----------( 364      ( 02 Oct 2017 05:26 )             28.8                         8.9    11.0  )-----------( 389      ( 01 Oct 2017 01:13 )             26.6     10-04    126<L>  |  89<L>  |  20  ----------------------------<  107<H>  5.3   |  25  |  0.65  10-03    127<L>  |  89<L>  |  14  ----------------------------<  113<H>  5.0   |  25  |  0.60  10-02    130<L>  |  92<L>  |  14  ----------------------------<  119<H>  4.8   |  28  |  0.60  10-01    128<L>  |  91<L>  |  15  ----------------------------<  178<H>  5.0   |  26  |  0.62    Ca    9.4      04 Oct 2017 06:02  Ca    9.3      03 Oct 2017 05:33      CAPILLARY BLOOD GLUCOSE            PT/INR - ( 04 Oct 2017 06:02 )   PT: 23.1 sec;   INR: 2.11 ratio             Fluid Source Pleural Fluid  Albumin, Fluid2.3 g/dL  Glucose, Fluid98 mg/dL  Protein total, Fluid4.4 g/dL  Lacatate Dehydrogenase, [id number] U/L  pH, Fluid7.80  Cytopathology-Non Gyn Report--  Cultures:       Blood Culture:           09-28 @ 00:32  Organism --  Gram stain   polymorphonuclear leukocytes seen  No organisms seen  by cytocentrifuge        Wound culture:                09-28 @ 00:32  Organism --  Culture w/ gram stain --  Specimen Source .Body Fluid Pleural Fluid      Abscess culture:             09-28 @ 00:32  Organism --  Gram Stain --  Specimen Source .Body Fluid Pleural Fluid      CSF:              09-28 @ 00:32  Organism --  Gram Stain   polymorphonuclear leukocytes seen  No organisms seen  by cytocentrifuge      Tissue culture:           09-28 @ 00:32  Organism --  Gram Stain   polymorphonuclear leukocytes seen  No organisms seen  by cytocentrifuge  Specimen Source .Body Fluid Pleural Fluid      Body Fluid Smear & Culture:                        09-28 @ 00:32  AFB Smear    No acid fast bacilli seen by fluorochrome stain  Culture Acid Fast Body Fluid w/ Smear  --  Culture Acid Fast Smear Concentrated   --    Culture Results:       No growth at 5 days  Specimen Source .Body Fluid Pleural Fluid      < from: Xray Chest 1 View AP- PORTABLE-Urgent (09.29.17 @ 16:37) >        INTERPRETATION:  AP chest with comparison to 1347 hours from the same day.    IMPRESSION: Left pigtail catheter removed.    IMPRESSION: Left-sided pigtail catheter has been removed. There is no   pneumothorax. The heart is normal in size. The patient is status post   median sternotomy. The lungs are clear.                    TIA BENAVIDES M.D., ATTENDING RADIOLOGIST  This document has been electronically signed. Sep 30 2017  4:18PM    < end of copied text >          Studies  Chest X-RAY  CT SCAN Chest   Venous Dopplers: LE:   CT Abdomen  Others

## 2017-10-04 NOTE — PROGRESS NOTE ADULT - SUBJECTIVE AND OBJECTIVE BOX
Subjective: Pt states "I'm tired" denies any CP, SOB, N/V and palpitations. No acute events overnight.     Telemetry:  90 - 100 SR  Vital Signs Last 24 Hrs  T(F): 98.4 (10-04-17 @ 18:00), Max: 98.4   HR: 98   RR: 18   SpO2: 97% RA        10-04 @ 07:01  -  10-04 @ 19:34  --------------------------------------------------------  IN: 340 mL / OUT: 500 mL / NET: -160 mL    PHYSICAL EXAM  Neurology: A&Ox3, nonfocal, no gross deficits  CV: +LVAD hum  Sternal Wound: MSI CDI TAI, Stable  Lungs: Respirations non-labored, B/L BS CTA  Abdomen: Soft, NT/ND, +BSx4Q, last BM 10/4  (-)N/V/D  : Voiding without difficulty  Extremities: B/L LE trace edema, negative calf tenderness, +PP           MEDICATIONS  aspirin enteric coated 81 milliGRAM(s) Oral daily  glycerin Suppository - Adult 1 Suppository(s) Rectal every 4 hours  lidocaine   Patch 1 Patch Transdermal daily  lisinopril 5 milliGRAM(s) Oral daily  metoprolol succinate ER 12.5 milliGRAM(s) Oral daily  mirtazapine 7.5 milliGRAM(s) Oral at bedtime  oxyCODONE    IR 5 milliGRAM(s) Oral every 6 hours PRN  oxyCODONE  ER Tablet 10 milliGRAM(s) Oral every 12 hours  pantoprazole    Tablet 40 milliGRAM(s) Oral before breakfast  polyethylene glycol 3350 17 Gram(s) Oral at bedtime  senna 2 Tablet(s) Oral at bedtime  warfarin 6 milliGRAM(s) Oral once      Physical Therapy Rec:     Rehab     Discussed with Cardiothoracic Team at AM rounds.

## 2017-10-04 NOTE — CONSULT NOTE ADULT - SUBJECTIVE AND OBJECTIVE BOX
[facility] DIVISION OF KIDNEY DISEASES AND HYPERTENSION -- INITIAL CONSULT NOTE  --------------------------------------------------------------------------------  HPI: 62 year old male with a PMH of nonischemic cardiomyopathy/ HFrEF now s/p Heart Mate II LVAD with TV annuloplasty ring on 9/12/17. Patient was found to be hyponatremic (130) starting from 9/14/17 until today (126).  During his stay he had several procedures and also was given IV diuresis. The patient does complain of abdominal pain, nausea, vomiting and poor appetite. The patient recently was start on anti-depression medication within the last one week. Patient currently seen and examined at bedside. He is slow to answer questions and has a flat affect. Prior mental status unknown. Admits to Abdominal pain, vomiting and poor oral intake.       PAST HISTORY  --------------------------------------------------------------------------------  PAST MEDICAL & SURGICAL HISTORY:  No pertinent past medical history  No significant past surgical history    FAMILY HISTORY:  No pertinent family history in first degree relatives    PAST SOCIAL HISTORY:    ALLERGIES & MEDICATIONS  --------------------------------------------------------------------------------  Allergies    No Known Allergies    Intolerances      Standing Inpatient Medications  aspirin enteric coated 81 milliGRAM(s) Oral daily  glycerin Suppository - Adult 1 Suppository(s) Rectal every 4 hours  lidocaine   Patch 1 Patch Transdermal daily  lisinopril 5 milliGRAM(s) Oral daily  metoprolol succinate ER 12.5 milliGRAM(s) Oral daily  mirtazapine 7.5 milliGRAM(s) Oral at bedtime  oxyCODONE  ER Tablet 10 milliGRAM(s) Oral every 12 hours  pantoprazole    Tablet 40 milliGRAM(s) Oral before breakfast  polyethylene glycol 3350 17 Gram(s) Oral at bedtime  senna 2 Tablet(s) Oral at bedtime  warfarin 6 milliGRAM(s) Oral once    PRN Inpatient Medications  oxyCODONE    IR 5 milliGRAM(s) Oral every 6 hours PRN      REVIEW OF SYSTEMS  --------------------------------------------------------------------------------  Gen: +weakness  Skin: No rashes  Head/Eyes/Ears/Mouth: No headache  Respiratory: No dyspnea  CV: No chest pain  GI: No abdominal pain  : No increased frequency  MSK: No edema  Neuro: No dizziness/lightheadedness      All other systems were reviewed and are negative, except as noted.    VITALS/PHYSICAL EXAM  --------------------------------------------------------------------------------  T(C): 36.7 (10-04-17 @ 14:00), Max: 36.9 (10-03-17 @ 18:00)  HR: 99 (10-04-17 @ 14:00) (90 - 99)  BP: --  RR: 18 (10-04-17 @ 14:00) (18 - 18)  SpO2: 96% (10-04-17 @ 14:00) (96% - 100%)  Wt(kg): --        10-03-17 @ 07:01  -  10-04-17 @ 07:00  --------------------------------------------------------  IN: 1430 mL / OUT: 1275 mL / NET: 155 mL    10-04-17 @ 07:01  -  10-04-17 @ 16:48  --------------------------------------------------------  IN: 340 mL / OUT: 300 mL / NET: 40 mL      Physical Exam:  	Gen: NAD, awake, responds slow to questions   	HEENT: supple neck  	Pulm: Decreased BS at bases B/L  	CV: RRR, S1S2  	Abd: +BS, soft, nontender/nondistended  	LE: Warm, FROM, no edema  	Neuro: No focal deficits, intact gait  	Psych: flat affect   	Skin: Warm      LABS/STUDIES  --------------------------------------------------------------------------------              9.9    10.4  >-----------<  341      [10-04-17 @ 06:02]              30.1     126  |  89  |  20  ----------------------------<  107      [10-04-17 @ 06:02]  5.3   |  25  |  0.65        Ca     9.4     [10-04-17 @ 06:02]      PT/INR: PT 23.1 , INR 2.11       [10-04-17 @ 06:02]          [10-04-17 @ 06:02]    Creatinine Trend:  SCr 0.65 [10-04 @ 06:02]  SCr 0.60 [10-03 @ 05:33]  SCr 0.60 [10-02 @ 05:26]  SCr 0.62 [10-01 @ 01:13]  SCr 0.64 [09-30 @ 04:58]    Urinalysis - [10-04-17 @ 14:24]      Color Yellow / Appearance SL Turbid / SG 1.021 / pH 7.0      Gluc Negative / Ketone Negative  / Bili Negative / Urobili Negative       Blood Negative / Protein Negative / Leuk Est Negative / Nitrite Negative      RBC 0-2 / WBC 2-5 / Hyaline  / Gran  / Sq Epi  / Non Sq Epi  / Bacteria     Urine Creatinine 191      [10-03-17 @ 17:40]  Urine Sodium 112      [10-03-17 @ 17:40]  Urine Potassium >100      [10-03-17 @ 17:40]  Urine Chloride 90      [10-03-17 @ 17:40]  Urine Osmolality 784      [10-03-17 @ 17:40]    Iron 51, TIBC 261, %sat 20      [09-01-17 @ 21:40]  Ferritin 286.0      [09-01-17 @ 20:17]  HbA1c 6.2      [09-01-17 @ 00:08]  TSH 4.92      [09-01-17 @ 20:17]  Lipid: chol 185, TG 67, HDL 49,       [08-26-17 @ 08:52]    HBsAg Nonreact      [09-01-17 @ 20:17]  HCV 0.08, Nonreact      [09-01-17 @ 20:17]  HIV Nonreact      [09-01-17 @ 20:17]    Syphilis Screen (Treponema Pallidum Ab) Negative      [09-06-17 @ 12:35]

## 2017-10-04 NOTE — PROGRESS NOTE ADULT - PROBLEM SELECTOR PLAN 1
The left side pig tail catheter is placed: dark bloody fluid removed: Has significantly high LDH but ph is OK: Exudative fluid: there are no organisms growing in the fluid.  Patient is on broad spectrum antibiotics : ID is following  09/29/2017: exudative effusion ? all blood? negative cultures to date!!  09/30: cultures remain negative: antibiotics discontinued!!: The CHEST RADIOGRAPH YESTERDAY WAS CLEAR:  So far cause of abdominal pain is unclear:   10/04: last chest x-ray pretty good without pneumonia or pleural effusion  10/02/2017: stable following removal of chest tube

## 2017-10-04 NOTE — PROGRESS NOTE ADULT - ATTENDING COMMENTS
Briefly, 61 y/o Creole speaking male who presented with abdominal pain and was found to be in low output heart failure with cardiogenic shock (EF 10%) underwent LVAD/TV annuloplasty for INTERMACS 2 9/12; postop had bleeding requiring PRBC, amicar, PLT, cryo. TTE 9/13 LVEDD 5.9 cm, mild-mod MR, aortic valve closed, mild RV dysfunction, mild TR. Hospital course c/b significant L sided pain; found to have L hemithorax s/p pigtail that was removed; fluid c/w exudative with lot of RBC, culture negative. Continues to have pain in L flank and frustration. Currently looks euvolemic. Labs noted for worsening hyponatremia with urine studies c/w likely SIADH possibly 2/2 pain. LVAD interrogated w/o events. Labs reviewed - INR 2.1, . Na downtrending. Currently compensated, stage D HF requiring HM2 as bridge to recovery.   - on lisinopril 5 mg daily; continue for now  - start toprol XL 25 mg daily  - appreciate renal input; likely SIADH; check TSH and cortisol (especially in light of mild hyperkalemia); free water restrict   - ambulate as tolerated  - flank pain and pt reports dysuria; UA negative   - continue coumadin 6 mg and ASA 81 mg daily  - appreciate psych input  - dispo planning in process; LVAD education Briefly, 61 y/o Creole speaking male who presented with abdominal pain and was found to be in low output heart failure with cardiogenic shock (EF 10%) underwent LVAD/TV annuloplasty for INTERMACS 2 9/12; postop had bleeding requiring PRBC, amicar, PLT, cryo. TTE 9/13 LVEDD 5.9 cm, mild-mod MR, aortic valve closed, mild RV dysfunction, mild TR. Hospital course c/b significant L sided pain; found to have L hemithorax s/p pigtail that was removed; fluid c/w exudative with lot of RBC, culture negative. Continues to have pain in L flank and frustration. Currently looks euvolemic. Labs noted for worsening hyponatremia with urine studies c/w likely SIADH possibly 2/2 pain. LVAD interrogated w/o events. Labs reviewed - INR 2.1, . Na downtrending. Currently compensated, stage D HF requiring HM2 as bridge to recovery.   - on lisinopril 5 mg daily; continue for now  - start toprol XL 12.5 mg daily  - appreciate renal input; likely SIADH; check TSH and cortisol (especially in light of mild hyperkalemia); free water restrict   - ambulate as tolerated  - flank pain and pt reports dysuria; UA negative   - continue coumadin 6 mg and ASA 81 mg daily  - appreciate psych input  - dispo planning in process; LVAD education

## 2017-10-04 NOTE — CONSULT NOTE ADULT - PROBLEM SELECTOR RECOMMENDATION 9
Patient presenting with Hyponatremia s/p LVAD and TV annuloplasty 9/12/17. There are several factors that may contribute to the Euvolemic hyponatremia. According to the patients urine studies, the patient likely has SIADH. The SIADH could be incited from pain/ vomiting however cannot rule out underlying adrenal insufficiency or thyroid etiology. Recommend AM cortisol and repeat TSH. Recommend repeat urine osm, urine na with serum osm in AM. Patient may benefit from nutritional support and pain control. Continue to monitor BMP closely.

## 2017-10-04 NOTE — PROGRESS NOTE ADULT - SUBJECTIVE AND OBJECTIVE BOX
Subjective:    Medications:  aspirin enteric coated 81 milliGRAM(s) Oral daily  lidocaine   Patch 1 Patch Transdermal daily  lisinopril 5 milliGRAM(s) Oral daily  mirtazapine 7.5 milliGRAM(s) Oral at bedtime  oxyCODONE    IR 5 milliGRAM(s) Oral every 6 hours PRN  oxyCODONE  ER Tablet 10 milliGRAM(s) Oral every 12 hours  pantoprazole    Tablet 40 milliGRAM(s) Oral before breakfast  senna 2 Tablet(s) Oral at bedtime      Physical Exam:    Vitals:  T(C): 36.9 (10-04-17 @ 06:00), Max: 36.9 (10-03-17 @ 18:00)  HR: 98 (10-04-17 @ 06:00) (90 - 98)  BP: --  BP(mean): 80 (10-04-17 @ 06:00) (66 - 80)    RR: 18 (10-04-17 @ 06:00) (18 - 18)  SpO2: 98% (10-04-17 @ 06:00) (98% - 100%)  Wt(kg): --  Vitals  Signs Last 24 Hrs  T(C): 36.9 (04 Oct 2017 06:00), Max: 36.9 (03 Oct 2017 18:00)  T(F): 98.4 (04 Oct 2017 06:00), Max: 98.4 (03 Oct 2017 18:00)  HR: 98 (04 Oct 2017 06:00) (90 - 98)  BP(mean): 80 (04 Oct 2017 06:00) (66 - 80)  RR: 18 (04 Oct 2017 06:00) (18 - 18)  SpO2: 98% (04 Oct 2017 06:00) (98% - 100%)       Daily Weight in k.5 (03 Oct 2017 11:04)    I&O's Summary    03 Oct 2017 07:01  -  04 Oct 2017 07:00  --------------------------------------------------------  IN: 1430 mL / OUT: 1275 mL / NET: 155 mL        General: No distress. Comfortable.  HEENT: EOM intact.  Neck: Neck supple. JVP not elevated. No masses  Chest: Clear to auscultation bilaterally  CV: Normal S1 and S2. No murmurs, rub, or gallops. Radial pulses normal.  Abdomen: Soft, non-distended, non-tender  Skin: No rashes or skin breakdown  Neurology: Alert and oriented times three. Sensation intact  Psych: Affect normal                          9.9    10.4  )-----------( 341      ( 04 Oct 2017 06:02 )             30.1     10-04    126<L>  |  89<L>  |  20  ----------------------------<  107<H>  5.3   |  25  |  0.65    Ca    9.4      04 Oct 2017 06:02      PT/INR - ( 04 Oct 2017 06:02 )   PT: 23.1 sec;   INR: 2.11 ratio         Lactate Dehydrogenase, Serum: 361 U/L (10-04 @ 06:02)  Lactate Dehydrogenase, Serum: 379 U/L (10-03 @ 05:33)  Lactate Dehydrogenase, Serum: 396 U/L (10-02 @ 05:26) Subjective: Pt sitting in chair c/o fatigue and generalized aches and pains     Medications:  aspirin enteric coated 81 milliGRAM(s) Oral daily  lidocaine   Patch 1 Patch Transdermal daily  lisinopril 5 milliGRAM(s) Oral daily  mirtazapine 7.5 milliGRAM(s) Oral at bedtime  oxyCODONE    IR 5 milliGRAM(s) Oral every 6 hours PRN  oxyCODONE  ER Tablet 10 milliGRAM(s) Oral every 12 hours  pantoprazole    Tablet 40 milliGRAM(s) Oral before breakfast  senna 2 Tablet(s) Oral at bedtime      Physical Exam:    Vitals:  T(C): 36.9 (10-04-17 @ 06:00), Max: 36.9 (10-03-17 @ 18:00)  HR: 98 (10-04-17 @ 06:00) (90 - 98)  BP(mean): 80 (10-04-17 @ 06:00) (66 - 80)    RR: 18 (10-04-17 @ 06:00) (18 - 18)  SpO2: 98% (10-04-17 @ 06:00) (98% - 100%)  Vitals  Signs Last 24 Hrs  T(C): 36.9 (04 Oct 2017 06:00), Max: 36.9 (03 Oct 2017 18:00)  T(F): 98.4 (04 Oct 2017 06:00), Max: 98.4 (03 Oct 2017 18:00)  HR: 98 (04 Oct 2017 06:00) (90 - 98)  BP(mean): 80 (04 Oct 2017 06:00) (66 - 80)  RR: 18 (04 Oct 2017 06:00) (18 - 18)  SpO2: 98% (04 Oct 2017 06:00) (98% - 100%)       Daily Weight in k.5 (03 Oct 2017 11:04)    I&O's Summary    03 Oct 2017 07:01  -  04 Oct 2017 07:00  --------------------------------------------------------  IN: 1430 mL / OUT: 1275 mL / NET: 155 mL      General: No distress. Comfortable.  HEENT: EOM intact.  Neck: Neck supple. JVP not elevated. No masses  Chest: Clear to auscultation bilaterally  CV: Normal S1 and S2. No murmurs, rub, or gallops. Radial pulses normal.  Abdomen: Soft, non-distended, non-tender  Skin: No rashes or skin breakdown  Neurology: Alert and oriented times three. Sensation intact  Psych: Affect normal                          9.9    10.4  )-----------( 341      ( 04 Oct 2017 06:02 )             30.1     10-04    126<L>  |  89<L>  |  20  ----------------------------<  107<H>  5.3   |  25  |  0.65    Ca    9.4      04 Oct 2017 06:02      PT/INR - ( 04 Oct 2017 06:02 )   PT: 23.1 sec;   INR: 2.11 ratio         Lactate Dehydrogenase, Serum: 361 U/L (10-04 @ 06:02)  Lactate Dehydrogenase, Serum: 379 U/L (10-03 @ 05:33)  Lactate Dehydrogenase, Serum: 396 U/L (10-02 @ 05:26)

## 2017-10-04 NOTE — PROGRESS NOTE ADULT - ASSESSMENT
63 yo male with no PMHx presented with sob and abdominal pain  on 8/25/17.  Cta - for PE,  cardiac mRI and cath revealed   lv dysfunction and MR.  Cardiology 1. Daily Shower  2. Weight yourself daily and notify any weight gain greater than 2-3 pounds in 24 hours.  3. Regular diet - low fat, low cholesterol, no added salt.  4. Cleanse Midsternal incision and leg incision daily while showering with warm water and mild soap, pat dry and maintain open to air.   5. Follow Cardiac Surgery Do's and Don'ts discharge instructions.   6. No driving until cleared by MD.   7. No heavy lifting nothing greater than 5 pounds until cleared by MD.   8. Call / Notify MD any fever greater than 101.0  9. Increase Activity as tolerated.as consulted, HF service consulted and the pt was placed in ccu with pulmonary edema. He had  low cardiac output and cardiogenic shock, and was  referred  for LVAD evaluation. ID was consulted to clear the patient for surgery.. GI was following for abd pain, no significant findings on virtual colonoscopy. He was treated for chf  with  afterload reduction  and milrinone and stabilized, euvolemic .  9/12/17 S/P Implantation of HeartMate II left ventricular assist device through median sternotomy, and repair of the tricuspid valve with a number 28 classic Christianson ring.  Post op he required inotropic support and PRBc's for acute blood loss anemia  He was transferred to sdu  9/18  He has c/o pain at the incision site and chest tube site  Chest tube to be d/c  9/20  Pain improved .  9/21 INR drifted to 1.8, Heparin started.  Lisinopril increased for BP and afterload  Leukocytosis, afebrile  Hyponatremia, off lasix  9/23 Leukocytosis WBC 19  CT chest/ab/plevis ordered  blood cx x2  ID consulted fluid bolus .9  per Dr Iyer low sodium, diet d/c regular diet ordered INR 2.08 heparin gtt d/c coumadin 5 tonight  9/24 cta done 2nd limited study prior.  Abdominal discomfort relieved with pain meds, d/w pain service, oxycontin recommended .  Ct scan with contrast reviewed by Dr Cadet.  Pt with loculated LLL effusion vs atelectasis, wbc improving but remains on antibiotics.  The coumadin was low dosed , awaiting IR tap of the loculated effusion and cultures to be sent.  Low map, ivf and decrease lisinopril  The IR procedure was cancelled by IR yesterday and he is still awaiting the thoracentisis.  The INR was below 2.0, heparin started.  9/27 pigtail placed in IR>300cc  with cultures neg to date.  F/u cultures, chest tube to water seal today , possible d/c later today.  9/30 Heparin Gtt  coumadin dosed daily ambulated today  10/2 transfer to floor Increase ambulation  10/4 renal consulted for persistent hyponatremia. Bladder scan no residual. Beta-blocker added.

## 2017-10-04 NOTE — PROGRESS NOTE ADULT - ASSESSMENT
62M with a nonischemic cardiomyopathy with ACC/AHA stage D congestive heart failure who presented with abdominal pain due to cardiogenic shock, now s/p Heart Mate II LVAD with TV annuloplasty ring on 9/12. His postoperative course was complicated by bleeding requiring transfusions, which has stabilized He currently is euvolemic without evidence of right heart failure. his MAPs are at goal. Pt is s/p thoracentesis and chest tube placement 9/27 s/p removal  9/28 . Pt presenting 62M with a nonischemic cardiomyopathy with ACC/AHA stage D congestive heart failure who presented with abdominal pain due to cardiogenic shock, now s/p Heart Mate II LVAD with TV annuloplasty ring on 9/12. His postoperative course was complicated by bleeding requiring transfusions, which has stabilized He currently is euvolemic without evidence of right heart failure. his MAPs are at goal. Pt is s/p thoracentesis and chest tube placement 9/27 s/p removal  9/28 . Pt presenting today with on going hyponatremia and elevated K    Maps are improved with normal JVD and no PI events

## 2017-10-05 LAB
ANION GAP SERPL CALC-SCNC: 13 MMOL/L — SIGNIFICANT CHANGE UP (ref 5–17)
BUN SERPL-MCNC: 18 MG/DL — SIGNIFICANT CHANGE UP (ref 7–23)
CALCIUM SERPL-MCNC: 9.7 MG/DL — SIGNIFICANT CHANGE UP (ref 8.4–10.5)
CHLORIDE SERPL-SCNC: 90 MMOL/L — LOW (ref 96–108)
CO2 SERPL-SCNC: 25 MMOL/L — SIGNIFICANT CHANGE UP (ref 22–31)
CORTIS AM PEAK SERPL-MCNC: 20.7 UG/DL — HIGH (ref 6–18.4)
CREAT SERPL-MCNC: 0.6 MG/DL — SIGNIFICANT CHANGE UP (ref 0.5–1.3)
GLUCOSE SERPL-MCNC: 116 MG/DL — HIGH (ref 70–99)
HCT VFR BLD CALC: 30.5 % — LOW (ref 39–50)
HGB BLD-MCNC: 10.3 G/DL — LOW (ref 13–17)
INR BLD: 2.32 RATIO — HIGH (ref 0.88–1.16)
LDH SERPL L TO P-CCNC: 378 U/L — HIGH (ref 50–242)
MCHC RBC-ENTMCNC: 32.4 PG — SIGNIFICANT CHANGE UP (ref 27–34)
MCHC RBC-ENTMCNC: 33.8 GM/DL — SIGNIFICANT CHANGE UP (ref 32–36)
MCV RBC AUTO: 95.9 FL — SIGNIFICANT CHANGE UP (ref 80–100)
PLATELET # BLD AUTO: 338 K/UL — SIGNIFICANT CHANGE UP (ref 150–400)
POTASSIUM SERPL-MCNC: 5 MMOL/L — SIGNIFICANT CHANGE UP (ref 3.5–5.3)
POTASSIUM SERPL-SCNC: 5 MMOL/L — SIGNIFICANT CHANGE UP (ref 3.5–5.3)
PROTHROM AB SERPL-ACNC: 25.7 SEC — HIGH (ref 9.8–12.7)
RBC # BLD: 3.19 M/UL — LOW (ref 4.2–5.8)
RBC # FLD: 13.6 % — SIGNIFICANT CHANGE UP (ref 10.3–14.5)
SODIUM SERPL-SCNC: 128 MMOL/L — LOW (ref 135–145)
T3 SERPL-MCNC: 72 NG/DL — LOW (ref 80–200)
T4 AB SER-ACNC: 8 UG/DL — SIGNIFICANT CHANGE UP (ref 4.6–12)
TSH SERPL-MCNC: 0.81 UIU/ML — SIGNIFICANT CHANGE UP (ref 0.27–4.2)
WBC # BLD: 10.5 K/UL — SIGNIFICANT CHANGE UP (ref 3.8–10.5)
WBC # FLD AUTO: 10.5 K/UL — SIGNIFICANT CHANGE UP (ref 3.8–10.5)

## 2017-10-05 PROCEDURE — 99233 SBSQ HOSP IP/OBS HIGH 50: CPT | Mod: 25

## 2017-10-05 PROCEDURE — 93750 INTERROGATION VAD IN PERSON: CPT

## 2017-10-05 PROCEDURE — 99233 SBSQ HOSP IP/OBS HIGH 50: CPT | Mod: GC

## 2017-10-05 RX ORDER — OXYCODONE HYDROCHLORIDE 5 MG/1
5 TABLET ORAL EVERY 6 HOURS
Qty: 0 | Refills: 0 | Status: DISCONTINUED | OUTPATIENT
Start: 2017-10-05 | End: 2017-10-10

## 2017-10-05 RX ORDER — WARFARIN SODIUM 2.5 MG/1
6 TABLET ORAL ONCE
Qty: 0 | Refills: 0 | Status: COMPLETED | OUTPATIENT
Start: 2017-10-05 | End: 2017-10-05

## 2017-10-05 RX ADMIN — MIRTAZAPINE 7.5 MILLIGRAM(S): 45 TABLET, ORALLY DISINTEGRATING ORAL at 22:04

## 2017-10-05 RX ADMIN — OXYCODONE HYDROCHLORIDE 5 MILLIGRAM(S): 5 TABLET ORAL at 09:45

## 2017-10-05 RX ADMIN — Medication 81 MILLIGRAM(S): at 12:17

## 2017-10-05 RX ADMIN — LISINOPRIL 5 MILLIGRAM(S): 2.5 TABLET ORAL at 06:06

## 2017-10-05 RX ADMIN — OXYCODONE HYDROCHLORIDE 10 MILLIGRAM(S): 5 TABLET ORAL at 18:03

## 2017-10-05 RX ADMIN — Medication 12.5 MILLIGRAM(S): at 06:07

## 2017-10-05 RX ADMIN — OXYCODONE HYDROCHLORIDE 5 MILLIGRAM(S): 5 TABLET ORAL at 10:15

## 2017-10-05 RX ADMIN — OXYCODONE HYDROCHLORIDE 10 MILLIGRAM(S): 5 TABLET ORAL at 06:37

## 2017-10-05 RX ADMIN — OXYCODONE HYDROCHLORIDE 10 MILLIGRAM(S): 5 TABLET ORAL at 06:07

## 2017-10-05 RX ADMIN — PANTOPRAZOLE SODIUM 40 MILLIGRAM(S): 20 TABLET, DELAYED RELEASE ORAL at 06:07

## 2017-10-05 RX ADMIN — SENNA PLUS 2 TABLET(S): 8.6 TABLET ORAL at 22:04

## 2017-10-05 RX ADMIN — WARFARIN SODIUM 6 MILLIGRAM(S): 2.5 TABLET ORAL at 22:04

## 2017-10-05 RX ADMIN — POLYETHYLENE GLYCOL 3350 17 GRAM(S): 17 POWDER, FOR SOLUTION ORAL at 22:04

## 2017-10-05 NOTE — PROGRESS NOTE ADULT - SUBJECTIVE AND OBJECTIVE BOX
Telemetry:      Vital Signs Last 24 Hrs  T(C): 37.1 (10-05-17 @ 10:00), Max: 37.1 (10-05-17 @ 10:00)  T(F): 98.8 (10-05-17 @ 10:00), Max: 98.8 (10-05-17 @ 10:00)  HR: 94 (10-05-17 @ 10:00) (90 - 99)  BP: --  RR: 18 (10-05-17 @ 10:00) (17 - 18)  SpO2: 99% (10-05-17 @ 10:00) (96% - 100%)                   10-04 @ 07:01  -  10-05 @ 07:00  --------------------------------------------------------  IN: 440 mL / OUT: 850 mL / NET: -410 mL    10-05 @ 07:01  -  10-05 @ 12:51  --------------------------------------------------------  IN: 600 mL / OUT: 300 mL / NET: 300 mL          Daily     Daily Weight in k.8 (05 Oct 2017 11:00)        CAPILLARY BLOOD GLUCOSE              Drains:     MS         [  ] Drainage:                 L Pleural  [  ]  Drainage:                R Pleural  [  ]  Drainage:    Pacing Wires        [  ]   Settings:                                  Isolated  [  ]    Coumadin    [ ] YES          [  ]      NO         Reason:                                 10.3   10.5  )-----------( 338      ( 05 Oct 2017 06:18 )             30.5       10-05    128<L>  |  90<L>  |  18  ----------------------------<  116<H>  5.0   |  25  |  0.60    Ca    9.7      05 Oct 2017 06:18        PT/INR - ( 05 Oct 2017 06:18 )   PT: 25.7 sec;   INR: 2.32 ratio             PHYSICAL EXAM:    Neurology: alert and oriented x 3, nonfocal, no gross deficits    CV :    Sternal Wound :  CDI , Stable    Lungs:    Abdomen: soft, nontender, nondistended, positive bowel sounds, last bowel movement     :               Extremities:               aspirin enteric coated 81 milliGRAM(s) Oral daily  glycerin Suppository - Adult 1 Suppository(s) Rectal every 4 hours  lidocaine   Patch 1 Patch Transdermal daily  lisinopril 5 milliGRAM(s) Oral daily  metoprolol succinate ER 12.5 milliGRAM(s) Oral daily  mirtazapine 7.5 milliGRAM(s) Oral at bedtime  oxyCODONE    IR 5 milliGRAM(s) Oral every 6 hours PRN  oxyCODONE  ER Tablet 10 milliGRAM(s) Oral every 12 hours  pantoprazole    Tablet 40 milliGRAM(s) Oral before breakfast  polyethylene glycol 3350 17 Gram(s) Oral at bedtime  senna 2 Tablet(s) Oral at bedtime  warfarin 6 milliGRAM(s) Oral once                              Physical Therapy Rec:   Home  [  ]   Home w/ PT  [  ]  Rehab  [  ]    Discussed with Cardiothoracic Team at AM rounds. S:  co pain.  No SOB    Telemetry:  SR     Vital Signs Last 24 Hrs  T(C): 37.1 (10-05-17 @ 10:00), Max: 37.1 (10-05-17 @ 10:00)  T(F): 98.8 (10-05-17 @ 10:00), Max: 98.8 (10-05-17 @ 10:00)  HR: 94 (10-05-17 @ 10:00) (90 - 99)  BP: --  RR: 18 (10-05-17 @ 10:00) (17 - 18)  SpO2: 99% (10-05-17 @ 10:00) (96% - 100%)                   10-04 @ 07:01  -  10-05 @ 07:00  --------------------------------------------------------  IN: 440 mL / OUT: 850 mL / NET: -410 mL    10-05 @ 07:01  -  10-05 @ 12:51  --------------------------------------------------------  IN: 600 mL / OUT: 300 mL / NET: 300 mL      Daily Weight in k.8 (05 Oct 2017 11:00)              Coumadin    [x ] YES          [  ]      NO         Reason:     LVAD                            10.3   10.5  )-----------( 338      ( 05 Oct 2017 06:18 )             30.5       10-05    128<L>  |  90<L>  |  18  ----------------------------<  116<H>  5.0   |  25  |  0.60    Ca    9.7      05 Oct 2017 06:18        PT/INR - ( 05 Oct 2017 06:18 )   PT: 25.7 sec;   INR: 2.32 ratio             PHYSICAL EXAM:    Neurology: alert and oriented x 3, nonfocal, no gross deficits    CV : + LVAD sounds    Wound :  CDI , Stable    Lungs:  clear to ausc.  no w/r/r    Abdomen: soft, nontender, nondistended, positive bowel sounds    Extremities:   no edema        aspirin enteric coated 81 milliGRAM(s) Oral daily  glycerin Suppository - Adult 1 Suppository(s) Rectal every 4 hours  lidocaine   Patch 1 Patch Transdermal daily.  INR 2.32  lisinopril 5 milliGRAM(s) Oral daily  metoprolol succinate ER 12.5 milliGRAM(s) Oral daily  mirtazapine 7.5 milliGRAM(s) Oral at bedtime  oxyCODONE    IR 5 milliGRAM(s) Oral every 6 hours PRN  oxyCODONE  ER Tablet 10 milliGRAM(s) Oral every 12 hours  pantoprazole    Tablet 40 milliGRAM(s) Oral before breakfast  polyethylene glycol 3350 17 Gram(s) Oral at bedtime  senna 2 Tablet(s) Oral at bedtime  warfarin 6 milliGRAM(s) Oral once                              Physical Therapy Rec:   Home  [  ]   Home w/ PT  [  ]  Rehab  [ x ]    Discussed with Cardiothoracic Team at AM rounds.

## 2017-10-05 NOTE — PROGRESS NOTE ADULT - ASSESSMENT
61 yo male with no PMHx presented with sob and abdominal pain  on 8/25/17.  Cta - for PE,  cardiac mRI and cath revealed   lv dysfunction and MR.  Cardiology 1. Daily Shower  2. Weight yourself daily and notify any weight gain greater than 2-3 pounds in 24 hours.  3. Regular diet - low fat, low cholesterol, no added salt.  4. Cleanse Midsternal incision and leg incision daily while showering with warm water and mild soap, pat dry and maintain open to air.   5. Follow Cardiac Surgery Do's and Don'ts discharge instructions.   6. No driving until cleared by MD.   7. No heavy lifting nothing greater than 5 pounds until cleared by MD.   8. Call / Notify MD any fever greater than 101.0  9. Increase Activity as tolerated.as consulted, HF service consulted and the pt was placed in ccu with pulmonary edema. He had  low cardiac output and cardiogenic shock, and was  referred  for LVAD evaluation. ID was consulted to clear the patient for surgery.. GI was following for abd pain, no significant findings on virtual colonoscopy. He was treated for chf  with  afterload reduction  and milrinone and stabilized, euvolemic .  9/12/17 S/P Implantation of HeartMate II left ventricular assist device through median sternotomy, and repair of the tricuspid valve with a number 28 classic Christianson ring.  Post op he required inotropic support and PRBc's for acute blood loss anemia  He was transferred to sdu  9/18  He has c/o pain at the incision site and chest tube site  Chest tube to be d/c  9/20  Pain improved .  9/21 INR drifted to 1.8, Heparin started.  Lisinopril increased for BP and afterload  Leukocytosis, afebrile  Hyponatremia, off lasix  9/23 Leukocytosis WBC 19  CT chest/ab/plevis ordered  blood cx x2  ID consulted fluid bolus .9  per Dr Iyer low sodium, diet d/c regular diet ordered INR 2.08 heparin gtt d/c coumadin 5 tonight  9/24 cta done 2nd limited study prior.  Abdominal discomfort relieved with pain meds, d/w pain service, oxycontin recommended .  Ct scan with contrast reviewed by Dr Cadet.  Pt with loculated LLL effusion vs atelectasis, wbc improving but remains on antibiotics.  The coumadin was low dosed , awaiting IR tap of the loculated effusion and cultures to be sent.  Low map, ivf and decrease lisinopril  The IR procedure was cancelled by IR yesterday and he is still awaiting the thoracentisis.  The INR was below 2.0, heparin started.  9/27 pigtail placed in IR>300cc  with cultures neg to date.  F/u cultures, chest tube to water seal today , possible d/c later today.  9/30 Heparin Gtt  coumadin dosed daily ambulated today  10/2 transfer to floor Increase ambulation  10/4 renal consulted for persistent hyponatremia. Bladder scan no residual. Beta-blocker added. 61 yo male with no PMHx presented with sob and abdominal pain  on 8/25/17.  Cta - for PE,  cardiac mRI and cath revealed   lv dysfunction and MR.  Cardiology 1. Daily Shower  2. Weight yourself daily and notify any weight gain greater than 2-3 pounds in 24 hours.  3. Regular diet - low fat, low cholesterol, no added salt.  4. Cleanse Midsternal incision and leg incision daily while showering with warm water and mild soap, pat dry and maintain open to air.   5. Follow Cardiac Surgery Do's and Don'ts discharge instructions.   6. No driving until cleared by MD.   7. No heavy lifting nothing greater than 5 pounds until cleared by MD.   8. Call / Notify MD any fever greater than 101.0  9. Increase Activity as tolerated.as consulted, HF service consulted and the pt was placed in ccu with pulmonary edema. He had  low cardiac output and cardiogenic shock, and was  referred  for LVAD evaluation. ID was consulted to clear the patient for surgery.. GI was following for abd pain, no significant findings on virtual colonoscopy. He was treated for chf  with  afterload reduction  and milrinone and stabilized, euvolemic .  9/12/17 S/P Implantation of HeartMate II left ventricular assist device through median sternotomy, and repair of the tricuspid valve with a number 28 classic Christianson ring.  Post op he required inotropic support and PRBc's for acute blood loss anemia  He was transferred to sdu  9/18  He has c/o pain at the incision site and chest tube site  Chest tube to be d/c  9/20  Pain improved .  9/21 INR drifted to 1.8, Heparin started.  Lisinopril increased for BP and afterload  Leukocytosis, afebrile  Hyponatremia, off lasix  9/23 Leukocytosis WBC 19  CT chest/ab/plevis ordered  blood cx x2  ID consulted fluid bolus .9  per Dr Iyer low sodium, diet d/c regular diet ordered INR 2.08 heparin gtt d/c coumadin 5 tonight  9/24 cta done 2nd limited study prior.  Abdominal discomfort relieved with pain meds, d/w pain service, oxycontin recommended .  Ct scan with contrast reviewed by Dr Cadet.  Pt with loculated LLL effusion vs atelectasis, wbc improving but remains on antibiotics.  The coumadin was low dosed , awaiting IR tap of the loculated effusion and cultures to be sent.  Low map, ivf and decrease lisinopril  The IR procedure was cancelled by IR yesterday and he is still awaiting the thoracentisis.  The INR was below 2.0, heparin started.  9/27 pigtail placed in IR>300cc  with cultures neg to date.  F/u cultures, chest tube to water seal today , possible d/c later today.  9/30 Heparin Gtt  coumadin dosed daily ambulated today  10/2 transfer to floor Increase ambulation  10/4 renal consulted for persistent hyponatremia. Bladder scan no residual. Beta-blocker added.   10/5  awaiting rehab bed

## 2017-10-05 NOTE — PROGRESS NOTE ADULT - SUBJECTIVE AND OBJECTIVE BOX
Interval History:  c/o pain in L back 10/10 prior to pain med decreased to 6/10 with pain med x 1-2 hours  sodium improving     Medications:  aspirin enteric coated 81 milliGRAM(s) Oral daily  glycerin Suppository - Adult 1 Suppository(s) Rectal every 4 hours  lidocaine   Patch 1 Patch Transdermal daily  lisinopril 5 milliGRAM(s) Oral daily  metoprolol succinate ER 12.5 milliGRAM(s) Oral daily  mirtazapine 7.5 milliGRAM(s) Oral at bedtime  oxyCODONE    IR 5 milliGRAM(s) Oral every 6 hours PRN  oxyCODONE  ER Tablet 10 milliGRAM(s) Oral every 12 hours  pantoprazole    Tablet 40 milliGRAM(s) Oral before breakfast  polyethylene glycol 3350 17 Gram(s) Oral at bedtime  senna 2 Tablet(s) Oral at bedtime  warfarin 6 milliGRAM(s) Oral once      Vitals:  T(C): 36.7 (10-05-17 @ 18:00), Max: 37.1 (10-05-17 @ 10:00)  HR: 93 (10-05-17 @ 18:00) (90 - 97)  BP(mean): 62 (10-05-17 @ 18:00) (62 - 80)  RR: 18 (10-05-17 @ 18:00) (17 - 18)  SpO2: 99% (10-05-17 @ 18:00) (97% - 100%)    Daily     Daily Weight in k.8 (05 Oct 2017 11:00)    I&O's Summary    04 Oct 2017 07:01  -  05 Oct 2017 07:00  --------------------------------------------------------  IN: 440 mL / OUT: 850 mL / NET: -410 mL    05 Oct 2017 07:01  -  05 Oct 2017 19:18  --------------------------------------------------------  IN: 960 mL / OUT: 300 mL / NET: 660 mL    Physical Exam:  Appearance: mild distress d/t pain   HEENT: No JVD  Cardiovascular: Normal S1 S2, No murmurs/rubs/gallops  Respiratory: Clear to auscultation bilaterally  Gastrointestinal: Soft, Non-tender	  Skin: No cyanosis	  Neurologic: Non-focal  Extremities: No LE edema  Psychiatry: A & O x 3, Mood & affect appropriate    LVAD Interrogation:  Pump Flow: 5  Pump Speed: 9000  Pulse Index: 4.6  Pump Power: 5.3  VAD Events:  no events  Driveline evaluation: c/d/i  Programming Changes: No changes made    Labs:                        10.3   10.5  )-----------( 338      ( 05 Oct 2017 06:18 )             30.5     10    128<L>  |  90<L>  |  18  ----------------------------<  116<H>  5.0   |  25  |  0.60    Ca    9.7      05 Oct 2017 06:18      PT/INR - ( 05 Oct 2017 06:18 )   PT: 25.7 sec;   INR: 2.32 ratio           Lactate Dehydrogenase, Serum: 378 U/L (10-05 @ 06:18)  Lactate Dehydrogenase, Serum: 361 U/L (10-04 @ 06:02)  Lactate Dehydrogenase, Serum: 379 U/L (10-03 @ 05:33)

## 2017-10-05 NOTE — PROGRESS NOTE ADULT - SUBJECTIVE AND OBJECTIVE BOX
INTERVAL HPI/OVERNIGHT EVENTS: no new events    HPI:  62M with no PMHx here with chest pain. Began last week, described as pleuritic, worsens with cough and deep inspiration. Has been having SOB as well, dry cough. Recently vacationed in [facility] came back today. Saw doctor in [facility] but unclear what workup was. No fever, sick contacts, abd pain. CP described as midsternal, nonradiating. Has 25 pack year smoking history.  CTPA does not reveal PE, Pulm congestion noted.   No new overnight event.  No N/V/D.  Tolerating diet.    MEDICATIONS  (STANDING):  aspirin enteric coated 81 milliGRAM(s) Oral daily  docusate sodium 100 milliGRAM(s) Oral three times a day  polyethylene glycol 3350 17 Gram(s) Oral daily  pantoprazole    Tablet 40 milliGRAM(s) Oral before breakfast  lactobacillus acidophilus and bulgaricus Chewable 1 Tablet(s) Chew three times a day  sorbitol 70% Solution 30 milliLiter(s) Oral once  senna 2 Tablet(s) Oral at bedtime  oxyCODONE  ER Tablet 10 milliGRAM(s) Oral every 12 hours  lisinopril 5 milliGRAM(s) Oral daily  acetaminophen   Tablet. 975 milliGRAM(s) Oral every 8 hours  lidocaine   Patch 1 Patch Transdermal daily  warfarin 6 milliGRAM(s) Oral once    MEDICATIONS  (PRN):  bisacodyl Suppository 10 milliGRAM(s) Rectal daily PRN Constipation      Allergies    No Known Allergies    Intolerances          General:  No wt loss, fevers, chills, night sweats, fatigue,   Eyes:  Good vision, no reported pain  ENT:  No sore throat, pain, runny nose, dysphagia  CV:  No pain, palpitations, hypo/hypertension  Resp:  No dyspnea, cough, tachypnea, wheezing  GI:  No pain, No nausea, No vomiting, No diarrhea, No constipation, No weight loss, No fever, No pruritis, No rectal bleeding, No tarry stools, No dysphagia,  :  No pain, bleeding, incontinence, nocturia  Muscle:  No pain, weakness  Neuro:  No weakness, tingling, memory problems  Psych:  No fatigue, insomnia, mood problems, depression  Endocrine:  No polyuria, polydipsia, cold/heat intolerance  Heme:  No petechiae, ecchymosis, easy bruisability  Skin:  No rash, tattoos, scars, edema      PHYSICAL EXAM:   Vital Signs:  Vital Signs Last 24 Hrs  T(C): 36.7 (01 Oct 2017 13:00), Max: 36.8 (30 Sep 2017 18:00)  T(F): 98.1 (01 Oct 2017 13:00), Max: 98.3 (30 Sep 2017 18:58)  HR: 82 (01 Oct 2017 13:00) (73 - 91)  BP: --  BP(mean): 76 (01 Oct 2017 13:00) (72 - 84)  RR: 16 (01 Oct 2017 13:00) (16 - 18)  SpO2: 99% (01 Oct 2017 13:00) (98% - 100%)  Daily     Daily I&O's Summary    30 Sep 2017 07:01  -  01 Oct 2017 07:00  --------------------------------------------------------  IN: 1524 mL / OUT: 2450 mL / NET: -926 mL    01 Oct 2017 07:01  -  01 Oct 2017 15:36  --------------------------------------------------------  IN: 480 mL / OUT: 500 mL / NET: -20 mL        GENERAL:  Appears stated age, well-groomed, well-nourished, no distress  HEENT:  NC/AT,  conjunctivae clear and pink, no thyromegaly, nodules, adenopathy, no JVD, sclera -anicteric  CHEST:  Full & symmetric excursion, no increased effort, breath sounds clear  HEART:  Regular rhythm, S1, S2, no murmur/rub/S3/S4, no abdominal bruit, no edema  ABDOMEN:  Soft, non-tender, non-distended, normoactive bowel sounds,  no masses ,no hepato-splenomegaly, no signs of chronic liver disease  EXTEREMITIES:  no cyanosis,clubbing or edema  SKIN:  No rash/erythema/ecchymoses/petechiae/wounds/abscess/warm/dry  NEURO:  Alert, oriented, no asterixis, no tremor, no encephalopathy      LABS:                        8.9    11.0  )-----------( 389      ( 01 Oct 2017 01:13 )             26.6     10-01    128<L>  |  91<L>  |  15  ----------------------------<  178<H>  5.0   |  26  |  0.62    Ca    9.3      01 Oct 2017 01:13      PT/INR - ( 01 Oct 2017 01:13 )   PT: 22.7 sec;   INR: 2.05 ratio         PTT - ( 01 Oct 2017 01:13 )  PTT:103.0 sec    amylase   lipase  RADIOLOGY & ADDITIONAL TESTS:

## 2017-10-05 NOTE — PROGRESS NOTE ADULT - SUBJECTIVE AND OBJECTIVE BOX
[facility] DIVISION OF KIDNEY DISEASES AND HYPERTENSION -- FOLLOW UP NOTE  --------------------------------------------------------------------------------  Chief Complaint:    24 hour events/subjective:  Na improving      PAST HISTORY  --------------------------------------------------------------------------------  No significant changes to PMH, PSH, FHx, SHx, unless otherwise noted    ALLERGIES & MEDICATIONS  --------------------------------------------------------------------------------  Allergies    No Known Allergies    Intolerances      Standing Inpatient Medications  aspirin enteric coated 81 milliGRAM(s) Oral daily  glycerin Suppository - Adult 1 Suppository(s) Rectal every 4 hours  lidocaine   Patch 1 Patch Transdermal daily  lisinopril 5 milliGRAM(s) Oral daily  metoprolol succinate ER 12.5 milliGRAM(s) Oral daily  mirtazapine 7.5 milliGRAM(s) Oral at bedtime  oxyCODONE  ER Tablet 10 milliGRAM(s) Oral every 12 hours  pantoprazole    Tablet 40 milliGRAM(s) Oral before breakfast  polyethylene glycol 3350 17 Gram(s) Oral at bedtime  senna 2 Tablet(s) Oral at bedtime  warfarin 6 milliGRAM(s) Oral once    PRN Inpatient Medications  oxyCODONE    IR 5 milliGRAM(s) Oral every 6 hours PRN      REVIEW OF SYSTEMS  --------------------------------------------------------------------------------  unable to do    VITALS/PHYSICAL EXAM  --------------------------------------------------------------------------------  T(C): 37.1 (10-05-17 @ 10:00), Max: 37.1 (10-05-17 @ 10:00)  HR: 94 (10-05-17 @ 10:00) (90 - 98)  BP: --  RR: 18 (10-05-17 @ 10:00) (17 - 18)  SpO2: 99% (10-05-17 @ 10:00) (97% - 100%)  Wt(kg): --        10-04-17 @ 07:01  -  10-05-17 @ 07:00  --------------------------------------------------------  IN: 440 mL / OUT: 850 mL / NET: -410 mL    10-05-17 @ 07:01  -  10-05-17 @ 14:49  --------------------------------------------------------  IN: 840 mL / OUT: 300 mL / NET: 540 mL      Physical Exam:  	Gen: mild distress, doesn't want to speak much  	Pulm: CTA B/L  	CV: RRR, S1S2; no rub  	Abd: +BS, soft, nontender/nondistended              UE: Warm, FROM, no clubbing, intact strength; no edema; no asterixis  	LE: Warm, FROM, no clubbing, intact strength; no edema  	Psych: depressed mood  	    LABS/STUDIES  --------------------------------------------------------------------------------              10.3   10.5  >-----------<  338      [10-05-17 @ 06:18]              30.5     128  |  90  |  18  ----------------------------<  116      [10-05-17 @ 06:18]  5.0   |  25  |  0.60        Ca     9.7     [10-05-17 @ 06:18]      PT/INR: PT 25.7 , INR 2.32       [10-05-17 @ 06:18]          [10-05-17 @ 06:18]    Creatinine Trend:  SCr 0.60 [10-05 @ 06:18]  SCr 0.80 [10-04 @ 18:01]  SCr 0.65 [10-04 @ 06:02]  SCr 0.60 [10-03 @ 05:33]  SCr 0.60 [10-02 @ 05:26]    Urinalysis - [10-04-17 @ 14:24]      Color Yellow / Appearance SL Turbid / SG 1.021 / pH 7.0      Gluc Negative / Ketone Negative  / Bili Negative / Urobili Negative       Blood Negative / Protein Negative / Leuk Est Negative / Nitrite Negative      RBC 0-2 / WBC 2-5 / Hyaline  / Gran  / Sq Epi  / Non Sq Epi  / Bacteria     Urine Creatinine 191      [10-03-17 @ 17:40]  Urine Sodium 112      [10-03-17 @ 17:40]  Urine Potassium >100      [10-03-17 @ 17:40]  Urine Chloride 90      [10-03-17 @ 17:40]  Urine Osmolality 784      [10-03-17 @ 17:40]    Iron 51, TIBC 261, %sat 20      [09-01-17 @ 21:40]  Ferritin 286.0      [09-01-17 @ 20:17]  HbA1c 6.2      [09-01-17 @ 00:08]  TSH 0.81      [10-05-17 @ 07:13]  Lipid: chol 185, TG 67, HDL 49,       [08-26-17 @ 08:52]      Syphilis Screen (Treponema Pallidum Ab) Negative      [09-06-17 @ 12:35]

## 2017-10-05 NOTE — PROGRESS NOTE ADULT - ASSESSMENT
Briefly, 61 y/o Creole speaking male who presented with abdominal pain and was found to be in low output heart failure with cardiogenic shock (EF 10%) underwent LVAD/TV annuloplasty for INTERMACS 2 9/12; postop had bleeding requiring PRBC, amicar, PLT, cryo. TTE 9/13 LVEDD 5.9 cm, mild-mod MR, aortic valve closed, mild RV dysfunction, mild TR. Hospital course c/b significant L sided pain; found to have L hemithorax s/p pigtail that was removed; fluid c/w exudative with lot of RBC, culture negative. Continues to have pain in L flank and frustration. Currently looks euvolemic. Labs noted for worsening hyponatremia with urine studies c/w likely SIADH possibly 2/2 pain. LVAD interrogated w/o events. Labs reviewed - INR 2.1, . Na stable. Currently compensated, stage D HF requiring HM2 as bridge to recovery.   - on lisinopril 5 mg daily; continue for now; goal MAP 70-80  - continue toprol XL 12.5 mg daily  - appreciate renal input; likely SIADH; TSH decreased from prior however nl T4 likely sick euthyroid  - cortisol appropriate at 20   - free water restrict  - ambulate as tolerated  - continue coumadin 6 mg and ASA 81 mg daily  - appreciate psych input  - dispo planning in process; LVAD education

## 2017-10-05 NOTE — CHART NOTE - NSCHARTNOTEFT_GEN_A_CORE
Source: Patient [X ]    Family [ ]     other [ X] RN, Medical record    Pt reinier for malnutrition follow up. Pt noted to be a on 3 day calorie count, started 10/3, ends 10/5, to be reviewed 10/6. Pt with variable PO intake and consuming meals from home. Pt denies to offer food preferences, however is requesting change in Ensure enlive flavor to strawberry. Pt drinking Ensure enlive x1 daily at this time, states "its too much" RD encouraged PO intake. RD attempted to review HF and Coumadin education, however Pt not reports not being interested at this time. Pt aware RD will continue to follow up.     Per chart Pt with chronic systolic HF s/p LVAD, cardiac cachexia and hyponatremia.    Diet : Regular, Ensure enlive x3      Patient reports no GI distress      PO intake:  % of meals.      Source for PO intake [ X] Patient [ ] family [X ] chart [ ] staff [ ] other    Current Weight: 55.8kg, admission Wt: 61.1kg. Wt trending down, likely related to poor PO intake,   % Weight Change    Pertinent Medications: MEDICATIONS  (STANDING):  aspirin enteric coated 81 milliGRAM(s) Oral daily  glycerin Suppository - Adult 1 Suppository(s) Rectal every 4 hours  lidocaine   Patch 1 Patch Transdermal daily  lisinopril 5 milliGRAM(s) Oral daily  metoprolol succinate ER 12.5 milliGRAM(s) Oral daily  mirtazapine 7.5 milliGRAM(s) Oral at bedtime  oxyCODONE  ER Tablet 10 milliGRAM(s) Oral every 12 hours  pantoprazole    Tablet 40 milliGRAM(s) Oral before breakfast  polyethylene glycol 3350 17 Gram(s) Oral at bedtime  senna 2 Tablet(s) Oral at bedtime  warfarin 6 milliGRAM(s) Oral once    MEDICATIONS  (PRN):  oxyCODONE    IR 5 milliGRAM(s) Oral every 6 hours PRN Severe Pain (7 - 10)    Pertinent Labs:  Hgb/Hct:10.3/30.5-low, Na:128-low, K:5.0, Cl:90-low, BUN:18, Cr:0.60, Glucose:116-high, Ca:9.7-high, pre albumin: 19      Skin: intact     Estimated Needs:   [ X] no change since previous assessment  [ ] recalculated:       Previous Nutrition Diagnosis:      [X ] Increased Nutrient Needs    [ X] Food & Nutrition Related Knowledge Deficit   [ X] Malnutrition          Nutrition Diagnosis is [ ]X ongoing  [ ] resolved [ ] not applicable          New Nutrition Diagnosis: [X ] not applicable    1. Provide food preferences as requested by Pt/family within diet restrictions    2. Encourage PO intake during meal times   3. Continue to encourage diet education   4. Continue ensure enlive x3     Monitoring and Evaluation:     [X ] PO intake [X ] Tolerance to diet prescription [ X] weights [ X] follow up per protocol    [X ] other: RD remains available Sarah Siegler RD. Pager #[phone]

## 2017-10-05 NOTE — PROGRESS NOTE ADULT - ATTENDING COMMENTS
This hyponatremia is there since the LVAD and slowly trending down but stable now at 124 range. The crt also has been trending down suggestive of too much water on board. Given LVAD and unable to get a sense of MAP, need to rule out adrenal insuff given his MS changes and hyponatremia and mild hyperkalemia. Meanwhile would increase protein and salt intake and ok to use lasix if need be as it will help the hyponatremia. Urine lytes are more suggestive of euvoelmic hyponatremia.  Avoid any abx or drips in D5W.   Obstruction ruled out.  His TSH pre LVAD was 5ish and now is low ( not on synthroid) and low T3 as well. His cortisol is normal. Suggest endo eval to rule out any secondary causes of hypothyroidism or euthyroid syndrome and given his overall MS changes since LVAD.  Would first repeat all thyroid tests    D./w with NP in detail.    Vicente Orozco MD  Cell   Pager   Office

## 2017-10-06 DIAGNOSIS — R53.81 OTHER MALAISE: ICD-10-CM

## 2017-10-06 DIAGNOSIS — R52 PAIN, UNSPECIFIED: ICD-10-CM

## 2017-10-06 LAB
ANION GAP SERPL CALC-SCNC: 11 MMOL/L — SIGNIFICANT CHANGE UP (ref 5–17)
BUN SERPL-MCNC: 19 MG/DL — SIGNIFICANT CHANGE UP (ref 7–23)
CALCIUM SERPL-MCNC: 9.3 MG/DL — SIGNIFICANT CHANGE UP (ref 8.4–10.5)
CHLORIDE SERPL-SCNC: 92 MMOL/L — LOW (ref 96–108)
CO2 SERPL-SCNC: 26 MMOL/L — SIGNIFICANT CHANGE UP (ref 22–31)
CORTIS AM PEAK SERPL-MCNC: 20.6 UG/DL — HIGH (ref 6–18.4)
CREAT SERPL-MCNC: 0.68 MG/DL — SIGNIFICANT CHANGE UP (ref 0.5–1.3)
GLUCOSE SERPL-MCNC: 92 MG/DL — SIGNIFICANT CHANGE UP (ref 70–99)
HCT VFR BLD CALC: 30.3 % — LOW (ref 39–50)
HGB BLD-MCNC: 10 G/DL — LOW (ref 13–17)
INR BLD: 2.92 RATIO — HIGH (ref 0.88–1.16)
MCHC RBC-ENTMCNC: 31.6 PG — SIGNIFICANT CHANGE UP (ref 27–34)
MCHC RBC-ENTMCNC: 33 GM/DL — SIGNIFICANT CHANGE UP (ref 32–36)
MCV RBC AUTO: 95.8 FL — SIGNIFICANT CHANGE UP (ref 80–100)
OSMOLALITY SERPL: 277 MOS/KG — SIGNIFICANT CHANGE UP (ref 275–300)
PLATELET # BLD AUTO: 321 K/UL — SIGNIFICANT CHANGE UP (ref 150–400)
POTASSIUM SERPL-MCNC: 4.6 MMOL/L — SIGNIFICANT CHANGE UP (ref 3.5–5.3)
POTASSIUM SERPL-SCNC: 4.6 MMOL/L — SIGNIFICANT CHANGE UP (ref 3.5–5.3)
PROTHROM AB SERPL-ACNC: 32.2 SEC — HIGH (ref 9.8–12.7)
RBC # BLD: 3.16 M/UL — LOW (ref 4.2–5.8)
RBC # FLD: 13.4 % — SIGNIFICANT CHANGE UP (ref 10.3–14.5)
SODIUM SERPL-SCNC: 129 MMOL/L — LOW (ref 135–145)
SODIUM UR-SCNC: 49 MMOL/L — SIGNIFICANT CHANGE UP
WBC # BLD: 9.2 K/UL — SIGNIFICANT CHANGE UP (ref 3.8–10.5)
WBC # FLD AUTO: 9.2 K/UL — SIGNIFICANT CHANGE UP (ref 3.8–10.5)

## 2017-10-06 PROCEDURE — 99233 SBSQ HOSP IP/OBS HIGH 50: CPT | Mod: GC

## 2017-10-06 PROCEDURE — 93750 INTERROGATION VAD IN PERSON: CPT

## 2017-10-06 PROCEDURE — 99233 SBSQ HOSP IP/OBS HIGH 50: CPT | Mod: 25

## 2017-10-06 RX ORDER — TRAMADOL HYDROCHLORIDE 50 MG/1
25 TABLET ORAL EVERY 6 HOURS
Qty: 0 | Refills: 0 | Status: DISCONTINUED | OUTPATIENT
Start: 2017-10-06 | End: 2017-10-10

## 2017-10-06 RX ORDER — MIRTAZAPINE 45 MG/1
15 TABLET, ORALLY DISINTEGRATING ORAL AT BEDTIME
Qty: 0 | Refills: 0 | Status: DISCONTINUED | OUTPATIENT
Start: 2017-10-06 | End: 2017-10-06

## 2017-10-06 RX ORDER — TRAMADOL HYDROCHLORIDE 50 MG/1
25 TABLET ORAL EVERY 6 HOURS
Qty: 0 | Refills: 0 | Status: DISCONTINUED | OUTPATIENT
Start: 2017-10-06 | End: 2017-10-06

## 2017-10-06 RX ORDER — WARFARIN SODIUM 2.5 MG/1
3 TABLET ORAL ONCE
Qty: 0 | Refills: 0 | Status: COMPLETED | OUTPATIENT
Start: 2017-10-06 | End: 2017-10-06

## 2017-10-06 RX ORDER — MIRTAZAPINE 45 MG/1
7.5 TABLET, ORALLY DISINTEGRATING ORAL AT BEDTIME
Qty: 0 | Refills: 0 | Status: DISCONTINUED | OUTPATIENT
Start: 2017-10-06 | End: 2017-10-25

## 2017-10-06 RX ADMIN — TRAMADOL HYDROCHLORIDE 25 MILLIGRAM(S): 50 TABLET ORAL at 14:26

## 2017-10-06 RX ADMIN — OXYCODONE HYDROCHLORIDE 10 MILLIGRAM(S): 5 TABLET ORAL at 17:40

## 2017-10-06 RX ADMIN — MIRTAZAPINE 7.5 MILLIGRAM(S): 45 TABLET, ORALLY DISINTEGRATING ORAL at 20:36

## 2017-10-06 RX ADMIN — PANTOPRAZOLE SODIUM 40 MILLIGRAM(S): 20 TABLET, DELAYED RELEASE ORAL at 06:06

## 2017-10-06 RX ADMIN — Medication 81 MILLIGRAM(S): at 14:24

## 2017-10-06 RX ADMIN — SENNA PLUS 2 TABLET(S): 8.6 TABLET ORAL at 20:37

## 2017-10-06 RX ADMIN — WARFARIN SODIUM 3 MILLIGRAM(S): 2.5 TABLET ORAL at 20:36

## 2017-10-06 RX ADMIN — LISINOPRIL 5 MILLIGRAM(S): 2.5 TABLET ORAL at 06:06

## 2017-10-06 RX ADMIN — OXYCODONE HYDROCHLORIDE 10 MILLIGRAM(S): 5 TABLET ORAL at 17:06

## 2017-10-06 RX ADMIN — Medication 12.5 MILLIGRAM(S): at 06:06

## 2017-10-06 RX ADMIN — TRAMADOL HYDROCHLORIDE 25 MILLIGRAM(S): 50 TABLET ORAL at 14:55

## 2017-10-06 RX ADMIN — OXYCODONE HYDROCHLORIDE 5 MILLIGRAM(S): 5 TABLET ORAL at 10:15

## 2017-10-06 RX ADMIN — OXYCODONE HYDROCHLORIDE 5 MILLIGRAM(S): 5 TABLET ORAL at 09:45

## 2017-10-06 NOTE — PROGRESS NOTE ADULT - ATTENDING COMMENTS
Briefly, 63 y/o Creole speaking male who presented with abdominal pain and was found to be in low output heart failure with cardiogenic shock (EF 10%) underwent LVAD/TV annuloplasty for INTERMACS 2 9/12; postop had bleeding requiring PRBC, amicar, PLT, cryo. TTE 9/13 LVEDD 5.9 cm, mild-mod MR, aortic valve closed, mild RV dysfunction, mild TR. Hospital course c/b significant L sided pain; found to have L hemithorax s/p pigtail that was removed; fluid c/w exudative with lot of RBC, culture negative. Continues to have pain in L flank and frustration. Currently looks euvolemic. Labs noted for stable hyponatremia with urine studies c/w likely SIADH possibly 2/2 pain. LVAD interrogated w/o events. Labs reviewed - INR 2.9, . Na stable. Currently compensated, stage D HF requiring HM2 as bridge to recovery.   - on lisinopril 5 mg daily; continue for now; goal MAP 70-80  - continue toprol XL 12.5 mg daily  - appreciate renal input; likely SIADH; TSH decreased from prior however nl T4 likely sick euthyroid  - cortisol appropriate at 20   - free water restrict  - ambulate as tolerated  - coumadin 3 mg today; continue ASA 81 mg   - trial of tramadol for moderate pain; oxycodone for severe pain; emphasized to patient to request pain med  - appreciate psych input  - dispo planning in process; LVAD education

## 2017-10-06 NOTE — PROGRESS NOTE ADULT - ATTENDING COMMENTS
This hyponatremia is there since the LVAD and slowly trending down but stable now at 124 range. The crt also has been trending down suggestive of too much water on board. Given LVAD and unable to get a sense of MAP, need to rule out adrenal insuff given his MS changes and hyponatremia and mild hyperkalemia. Meanwhile would increase protein and salt intake and ok to use lasix if need be as it will help the hyponatremia. Urine lytes are more suggestive of euvoelmic hyponatremia.  Avoid any abx or drips in D5W.   His TSH pre LVAD was 5ish and now is low ( not on synthroid) and low T3 as well. His cortisol is normal. Sick euthyroid likely but awaiting endo eval.   Consider all repeat all thyroid tests  Cortisol is normal  If above both normal, SIADH likely from pain likely- needs more aggressive pain control.   D./w with NP and CHF team in detail.    Vicente Orozco MD  Cell   Pager   Office     For weekend coverage, call Dr Cheryl Black( fellow) and Dr Des Monsivais( Attending)

## 2017-10-06 NOTE — PROGRESS NOTE ADULT - SUBJECTIVE AND OBJECTIVE BOX
Subjective:    Medications:  aspirin enteric coated 81 milliGRAM(s) Oral daily  glycerin Suppository - Adult 1 Suppository(s) Rectal every 4 hours  lidocaine   Patch 1 Patch Transdermal daily  lisinopril 5 milliGRAM(s) Oral daily  metoprolol succinate ER 12.5 milliGRAM(s) Oral daily  mirtazapine 7.5 milliGRAM(s) Oral at bedtime  pantoprazole    Tablet 40 milliGRAM(s) Oral before breakfast  polyethylene glycol 3350 17 Gram(s) Oral at bedtime  senna 2 Tablet(s) Oral at bedtime      Vital Signs Last 24 Hrs  T(C): 36.9 (06 Oct 2017 06:00), Max: 37.1 (05 Oct 2017 10:00)  T(F): 98.4 (06 Oct 2017 06:00), Max: 98.8 (05 Oct 2017 10:00)  HR: 90 (06 Oct 2017 06:00) (90 - 94)  BP(mean): 72 (06 Oct 2017 06:00) (62 - 74)  RR: 17 (06 Oct 2017 06:00) (17 - 18)  SpO2: 98% (06 Oct 2017 06:00) (98% - 99%)    Daily Weight in k.8 (05 Oct 2017 11:00)  I&O's Detail    05 Oct 2017 07:01  -  06 Oct 2017 07:00  --------------------------------------------------------  IN:    Oral Fluid: 1010 mL  Total IN: 1010 mL    OUT:    Voided: 1300 mL  Total OUT: 1300 mL    Total NET: -290 mL          General: A/ox 3, No acute Distress  Neck: Supple, NO JVD  Cardiac: S1 S2, No M/R/G  Pulmonary: CTAB, Breathing unlabored, No Rhonchi/Rales/Wheezing  Abdomen: Soft, Non -tender, +BS   Extremities: No Rashes, No edema  Neuro: A/o x 3, No focal deficits  Psch: normal mood , normal affect  LVAD Interrogation:  Pump Flow:5.0  Pump Speed:9000  Pulse Index:6.1  Pump Power:5.1  VAD Events:   Driveline evaluation:  clean  and dry   Programming Changes: [ ] No changes made                         10.0   9.2   )-----------( 321      ( 06 Oct 2017 06:16 )             30.3     10-    129<L>  |  92<L>  |  19  ----------------------------<  92  4.6   |  26  |  0.68    Ca    9.3      06 Oct 2017 06:16      PT/INR - ( 06 Oct 2017 06:16 )   PT: 32.2 sec;   INR: 2.92 ratio Subjective: Ambulating in halls   Reports generalized aches       Medications:  aspirin enteric coated 81 milliGRAM(s) Oral daily  glycerin Suppository - Adult 1 Suppository(s) Rectal every 4 hours  lidocaine   Patch 1 Patch Transdermal daily  lisinopril 5 milliGRAM(s) Oral daily  metoprolol succinate ER 12.5 milliGRAM(s) Oral daily  mirtazapine 7.5 milliGRAM(s) Oral at bedtime  pantoprazole    Tablet 40 milliGRAM(s) Oral before breakfast  polyethylene glycol 3350 17 Gram(s) Oral at bedtime  senna 2 Tablet(s) Oral at bedtime      Vital Signs Last 24 Hrs  T(C): 36.9 (06 Oct 2017 06:00), Max: 37.1 (05 Oct 2017 10:00)  T(F): 98.4 (06 Oct 2017 06:00), Max: 98.8 (05 Oct 2017 10:00)  HR: 90 (06 Oct 2017 06:00) (90 - 94)  BP(mean): 72 (06 Oct 2017 06:00) (62 - 74)  RR: 17 (06 Oct 2017 06:00) (17 - 18)  SpO2: 98% (06 Oct 2017 06:00) (98% - 99%)    Daily Weight in k.8 (05 Oct 2017 11:00)  I&O's Detail    05 Oct 2017 07:01  -  06 Oct 2017 07:00  --------------------------------------------------------  IN:    Oral Fluid: 1010 mL  Total IN: 1010 mL    OUT:    Voided: 1300 mL  Total OUT: 1300 mL    Total NET: -290 mL          General: A/ox 3, No acute Distress  Neck: Supple, NO JVD  Cardiac: S1 S2, No M/R/G  Pulmonary: CTAB, Breathing unlabored, No Rhonchi/Rales/Wheezing  Abdomen: Soft, Non -tender, +BS   Extremities: No Rashes, No edema  Neuro: A/o x 3, No focal deficits  Psch: normal mood , normal affect  LVAD Interrogation:  Pump Flow:5.0  Pump Speed:9000  Pulse Index:6.1  Pump Power:5.1  Driveline evaluation:  clean  and dry   Programming Changes: [ ] No changes made                         10.0   9.2   )-----------( 321      ( 06 Oct 2017 06:16 )             30.3     10    129<L>  |  92<L>  |  19  ----------------------------<  92  4.6   |  26  |  0.68    Ca    9.3      06 Oct 2017 06:16      PT/INR - ( 06 Oct 2017 06:16 )   PT: 32.2 sec;   INR: 2.92 ratio

## 2017-10-06 NOTE — PROGRESS NOTE ADULT - PROBLEM SELECTOR PLAN 1
Euvolemic hyponatremia likely secondary to SIADH. Patient with improving serum sodium to 129 today. Patient to be seen by endocrine in regards to thyroid studies. Please manage pain as per primary team. Would continue to monitor patient BMP. Continue nutritional support. Avoid medications or IVF with D5

## 2017-10-06 NOTE — PROGRESS NOTE ADULT - SUBJECTIVE AND OBJECTIVE BOX
S:  still co pain.  Ambulated in hallway    Telemetry:  SR 80-90    Vital Signs Last 24 Hrs  T(C): 36.8 (10-06-17 @ 10:00), Max: 36.9 (10-05-17 @ 22:00)  T(F): 98.2 (10-06-17 @ 10:00), Max: 98.5 (10-05-17 @ 22:00)  HR: 90 (10-06-17 @ 10:00) (90 - 94)  BP: --  RR: 18 (10-06-17 @ 10:00) (17 - 18)  SpO2: 97% (10-06-17 @ 10:00) (97% - 99%)                   10-05 @ 07:01  -  10-06 @ 07:00  --------------------------------------------------------  IN: 1010 mL / OUT: 1300 mL / NET: -290 mL    10-06 @ 07:01  -  10-06 @ 12:14  --------------------------------------------------------  IN: 420 mL / OUT: 0 mL / NET: 420 mL      Daily Weight in k.8 (06 Oct 2017 11:27)            Coumadin    [x ] YES          [  ]      NO         Reason:     LVAD                            10.0   9.2   )-----------( 321      ( 06 Oct 2017 06:16 )             30.3       10    129<L>  |  92<L>  |  19  ----------------------------<  92  4.6   |  26  |  0.68    Ca    9.3      06 Oct 2017 06:16        PT/INR - ( 06 Oct 2017 06:16 )   PT: 32.2 sec;   INR: 2.92 ratio             PHYSICAL EXAM:    Neurology: alert and oriented x 3, nonfocal, no gross deficits    CV :+ LVAD sounds    Sternal Wound :  CDI , Stable    Lungs:  clear to ausc.  No w/r/r    Abdomen: soft, nontender, nondistended, positive bowel sounds, last bowel movement     Extremities:   no edema          aspirin enteric coated 81 milliGRAM(s) Oral daily  glycerin Suppository - Adult 1 Suppository(s) Rectal every 4 hours  lidocaine   Patch 1 Patch Transdermal daily  lisinopril 5 milliGRAM(s) Oral daily  metoprolol succinate ER 12.5 milliGRAM(s) Oral daily  mirtazapine 15 milliGRAM(s) Oral at bedtime  oxyCODONE    IR 5 milliGRAM(s) Oral every 6 hours PRN  oxyCODONE  ER Tablet 10 milliGRAM(s) Oral every 12 hours  pantoprazole    Tablet 40 milliGRAM(s) Oral before breakfast  polyethylene glycol 3350 17 Gram(s) Oral at bedtime  senna 2 Tablet(s) Oral at bedtime  traMADol 25 milliGRAM(s) Oral every 6 hours PRN  warfarin 3 milliGRAM(s) Oral once                                  Physical Therapy Rec:   Home  [  ]   Home w/ PT  [  ]  Rehab  [ x ]    Discussed with Cardiothoracic Team at AM rounds.

## 2017-10-06 NOTE — CONSULT NOTE ADULT - CONSULT REQUESTED DATE/TIME
05-Sep-2017 03:00
04-Oct-2017 16:48
05-Sep-2017 13:12
27-Aug-2017 12:25
27-Aug-2017 14:31
29-Aug-2017 17:00
06-Oct-2017 10:48

## 2017-10-06 NOTE — PROGRESS NOTE ADULT - PROBLEM SELECTOR PLAN 4
spoke with pt at bedside.  reviewed pain meds and regimen.  Pt states when he takes the meds it takes away the pain.  During the conversation pt stated that I was asking to many questions and said he didn't want to talk and asked me to leave. will follow up with pt at later time.

## 2017-10-06 NOTE — PROGRESS NOTE ADULT - SUBJECTIVE AND OBJECTIVE BOX
INTERVAL HPI/OVERNIGHT EVENTS: intermittent pain    Allergies    No Known Allergies    Intolerances        ADVANCE DIRECTIVES:    DNR: [x ] NO [ ] YES (Date) MOLST [ ] NO [ ] YES (Date)    MEDICATIONS  (STANDING):  aspirin enteric coated 81 milliGRAM(s) Oral daily  glycerin Suppository - Adult 1 Suppository(s) Rectal every 4 hours  lidocaine   Patch 1 Patch Transdermal daily  lisinopril 5 milliGRAM(s) Oral daily  metoprolol succinate ER 12.5 milliGRAM(s) Oral daily  mirtazapine 7.5 milliGRAM(s) Oral at bedtime  oxyCODONE  ER Tablet 10 milliGRAM(s) Oral every 12 hours  pantoprazole    Tablet 40 milliGRAM(s) Oral before breakfast  polyethylene glycol 3350 17 Gram(s) Oral at bedtime  senna 2 Tablet(s) Oral at bedtime  warfarin 3 milliGRAM(s) Oral once    MEDICATIONS  (PRN):  oxyCODONE    IR 5 milliGRAM(s) Oral every 6 hours PRN Severe Pain (7 - 10)  traMADol 25 milliGRAM(s) Oral every 6 hours PRN Moderate Pain (4 - 6)      PRESENT SYMPTOMS:  Source: [x ] Patient   [ ] Family   [ ] Team     Pain:                        [ ] No [x ] Yes             [ ] Mild [ ] Moderate [ ] Severe    Onset -  Location - mid sternal incision  Duration -  Character -   Alleviating/Aggravating - pain meds  Radiation -  Timing -    Dyspnea:                [x ] No [ ] Yes             [ ] Mild [ ] Moderate [ ] Severe    Anxiety:                  [x] No [ ] Yes             [ ] Mild [ ] Moderate [ ] Severe    Fatigue:                  [ ] No [x Yes             [x ] Mild [ ] Moderate [ ] Severe    Nausea:                  [x ] No [ ] Yes             [ ] Mild [ ] Moderate [ ] Severe    Loss of appetite:   [x No [ ] Yes             [ ] Mild [ ] Moderate [ ] Severe    Constipation:        [x ] No [ ] Yes             [ ] Mild [ ] Moderate [ ] Severe    Other Symptoms:  [x ] All other review of systems negative   [ ] Unable to obtain due to poor mentation     Karnofsky Performance Score/Palliative Performance Status Version 2:      40   %    PHYSICAL EXAM:  Vital Signs Last 24 Hrs  T(C): 37 (06 Oct 2017 14:00), Max: 37 (06 Oct 2017 14:00)  T(F): 98.6 (06 Oct 2017 14:00), Max: 98.6 (06 Oct 2017 14:00)  HR: 91 (06 Oct 2017 14:00) (90 - 94)  BP: --  BP(mean): 74 (06 Oct 2017 14:00) (62 - 74)  RR: 17 (06 Oct 2017 14:00) (17 - 18)  SpO2: 98% (06 Oct 2017 14:00) (97% - 99%) I&O's Summary    05 Oct 2017 07:01  -  06 Oct 2017 07:00  --------------------------------------------------------  IN: 1010 mL / OUT: 1300 mL / NET: -290 mL    06 Oct 2017 07:01  -  06 Oct 2017 15:23  --------------------------------------------------------  IN: 780 mL / OUT: 0 mL / NET: 780 mL        General:  [x ] Alert  [x ] Oriented x 3     [ ] Lethargic  [ ] Agitated   [x ] Cachexia   [ ] Unarousable  [x ] Verbal  [ ] Non-Verbal    HEENT:  [x ] Normal   [ ] Dry mouth   [ ] ET Tube    [ ] Trach  [ ] Oral lesions    Lungs:   [x ] Clear [ ] Tachypnea  [ ] Audible excessive secretions   [ ] Rhonchi        [ ] Right [ ] Left [ ] Bilateral  [ ] Crackles        [ ] Right [ ] Left [ ] Bilateral  [ ] Wheezing     [ ] Right [ ] Left [ ] Bilateral    Cardiovascular:  [x ] Regular [ ] Irregular [ ] Tachycardia   [ ] Bradycardia  [ ] Murmur [ ] Other    Abdomen: [x ] Soft  [ ] Distended   [ x] +BS  [x ] Non tender [ ] Tender  [ ]PEG   [ ]OGT/ NGT   Last BM:       Genitourinary: [x ] Normal [ ] Incontinent   [ ] Oliguria/Anuria   [ ] Landrum    Musculoskeletal:  [x ] Normal   [ ] Weakness  [ ] Bedbound/Wheelchair bound [ ] Edema    Neurological: [x ] No focal deficits  [ ] Cognitive impairment  [ ] Dysphagia [ ] Dysarthria [ ] Paresis [ ] Other     Skin: [x ] Normal   [ ] Pressure ulcer(s)    mid line incision              [ ] Rash    LABS:                        10.0   9.2   )-----------( 321      ( 06 Oct 2017 06:16 )             30.3     10-06    129<L>  |  92<L>  |  19  ----------------------------<  92  4.6   |  26  |  0.68    Ca    9.3      06 Oct 2017 06:16      PT/INR - ( 06 Oct 2017 06:16 )   PT: 32.2 sec;   INR: 2.92 ratio               Shock: [ ] Septic [ ] Cardiogenic [ ] Neurologic [ ] Hypovolemic  Vasopressors x   Inotrophs x     Oral Intake: [ ] Unable/mouth care only [ ] Minimal [ ] Moderate [x] Full Capability    Diet: [ ] NPO [ ] Tube feeds [ ] TPN [ ] Other     RADIOLOGY & ADDITIONAL STUDIES:    REFERRALS:   [ ] Chaplaincy  [ ] Hospice  [ ] Child Life  [ ] Social Work  [ ] Case management [ ] Holistic Therapy

## 2017-10-06 NOTE — CONSULT NOTE ADULT - ASSESSMENT
Assessment  Hyponatremia: Patient is euthyroid and not adrenally insufficient, will work up for SIADH, may start on fluid restriction, will get serum/urine osmolalty and urine sodium, will FU with results.   CHF: continue treatment, cardiology team FU
62 year old male with a PMH of nonischemic cardiomyopathy/ HFrEF now s/p Heart Mate II LVAD with TV annuloplasty ring on 9/12/17. Patient was found to be hyponatremic (130) starting from 9/14/17 until today (126).
63 yo man originally from [facility], without a reported history of any infections requiring hospitalizations. Patient was feeling at his baseline until a day after arriving in [facility] on 8/7 to [place].  Denies fevers or chills or acute intercurrent illnesses.    I see no contra-indications to a possible LVAD    Would send screening tests for  Quantiferon Gold  RPR  stool for O and P  I don't think there is Chaga's disease in [facility], but can send serology  toxo antibody IGG
elderly patient with chronic smoking  history admitted with cough x 2 wks with SOB and found to have CHF with new severe LV dysfunction with CT scan suggestive of emphysema and moderate bilateral pleural effusion. He also is suspected to have LV thrombus? also had bilious vomiting and nausea with some abdominal pain: he is found to have desaturation in the night while sleeping.

## 2017-10-06 NOTE — CONSULT NOTE ADULT - CONSULT REASON
Hyponatremia
pre-LVAD infectious diseases clearance
Chest pain
Hyponatremia
Psychological Assessment as part of LVAD evaluation
SIL
abdominal pain

## 2017-10-06 NOTE — PROGRESS NOTE ADULT - ATTENDING COMMENTS
Patient seen separately to assess if patient would be more receptive to further discussions. Patient appeared frustrated and declined participating in extended discussions. Agree with changes to pain meds as per above. Advised patient to ask for PRN pain meds if needed as review of chart shows patient only asked for one breakthrough, and is still endorsing pain. Will continue to follow.

## 2017-10-06 NOTE — PROGRESS NOTE ADULT - SUBJECTIVE AND OBJECTIVE BOX
INTERVAL HPI/OVERNIGHT EVENTS: no new gi events  last bm 2 days ago    HPI:  62M with no PMHx here with chest pain. Began last week, described as pleuritic, worsens with cough and deep inspiration. Has been having SOB as well, dry cough. Recently vacationed in [facility] came back today. Saw doctor in [facility] but unclear what workup was. No fever, sick contacts, abd pain. CP described as midsternal, nonradiating. Has 25 pack year smoking history.  CTPA does not reveal PE, Pulm congestion noted.   No new overnight event.  No N/V/D.  Tolerating diet.    MEDICATIONS  (STANDING):  aspirin enteric coated 81 milliGRAM(s) Oral daily  docusate sodium 100 milliGRAM(s) Oral three times a day  polyethylene glycol 3350 17 Gram(s) Oral daily  pantoprazole    Tablet 40 milliGRAM(s) Oral before breakfast  lactobacillus acidophilus and bulgaricus Chewable 1 Tablet(s) Chew three times a day  sorbitol 70% Solution 30 milliLiter(s) Oral once  senna 2 Tablet(s) Oral at bedtime  oxyCODONE  ER Tablet 10 milliGRAM(s) Oral every 12 hours  lisinopril 5 milliGRAM(s) Oral daily  acetaminophen   Tablet. 975 milliGRAM(s) Oral every 8 hours  lidocaine   Patch 1 Patch Transdermal daily  warfarin 6 milliGRAM(s) Oral once    MEDICATIONS  (PRN):  bisacodyl Suppository 10 milliGRAM(s) Rectal daily PRN Constipation      Allergies    No Known Allergies    Intolerances          General:  No wt loss, fevers, chills, night sweats, fatigue,   Eyes:  Good vision, no reported pain  ENT:  No sore throat, pain, runny nose, dysphagia  CV:  No pain, palpitations, hypo/hypertension  Resp:  No dyspnea, cough, tachypnea, wheezing  GI:  No pain, No nausea, No vomiting, No diarrhea, No constipation, No weight loss, No fever, No pruritis, No rectal bleeding, No tarry stools, No dysphagia,  :  No pain, bleeding, incontinence, nocturia  Muscle:  No pain, weakness  Neuro:  No weakness, tingling, memory problems  Psych:  No fatigue, insomnia, mood problems, depression  Endocrine:  No polyuria, polydipsia, cold/heat intolerance  Heme:  No petechiae, ecchymosis, easy bruisability  Skin:  No rash, tattoos, scars, edema      PHYSICAL EXAM:   Vital Signs:  Vital Signs Last 24 Hrs  T(C): 36.7 (01 Oct 2017 13:00), Max: 36.8 (30 Sep 2017 18:00)  T(F): 98.1 (01 Oct 2017 13:00), Max: 98.3 (30 Sep 2017 18:58)  HR: 82 (01 Oct 2017 13:00) (73 - 91)  BP: --  BP(mean): 76 (01 Oct 2017 13:00) (72 - 84)  RR: 16 (01 Oct 2017 13:00) (16 - 18)  SpO2: 99% (01 Oct 2017 13:00) (98% - 100%)  Daily     Daily I&O's Summary    30 Sep 2017 07:01  -  01 Oct 2017 07:00  --------------------------------------------------------  IN: 1524 mL / OUT: 2450 mL / NET: -926 mL    01 Oct 2017 07:01  -  01 Oct 2017 15:36  --------------------------------------------------------  IN: 480 mL / OUT: 500 mL / NET: -20 mL        GENERAL:  Appears stated age, well-groomed, well-nourished, no distress  HEENT:  NC/AT,  conjunctivae clear and pink, no thyromegaly, nodules, adenopathy, no JVD, sclera -anicteric  CHEST:  Full & symmetric excursion, no increased effort, breath sounds clear  HEART:  Regular rhythm, S1, S2, no murmur/rub/S3/S4, no abdominal bruit, no edema  ABDOMEN:  Soft, non-tender, non-distended, normoactive bowel sounds,  no masses ,no hepato-splenomegaly, no signs of chronic liver disease  EXTEREMITIES:  no cyanosis,clubbing or edema  SKIN:  No rash/erythema/ecchymoses/petechiae/wounds/abscess/warm/dry  NEURO:  Alert, oriented, no asterixis, no tremor, no encephalopathy      LABS:                        8.9    11.0  )-----------( 389      ( 01 Oct 2017 01:13 )             26.6     10-01    128<L>  |  91<L>  |  15  ----------------------------<  178<H>  5.0   |  26  |  0.62    Ca    9.3      01 Oct 2017 01:13      PT/INR - ( 01 Oct 2017 01:13 )   PT: 22.7 sec;   INR: 2.05 ratio         PTT - ( 01 Oct 2017 01:13 )  PTT:103.0 sec    amylase   lipase  RADIOLOGY & ADDITIONAL TESTS:

## 2017-10-06 NOTE — PROGRESS NOTE ADULT - ASSESSMENT
Briefly, 61 y/o Creole speaking male who presented with abdominal pain and was found to be in low output heart failure with cardiogenic shock (EF 10%) underwent LVAD/TV annuloplasty for INTERMACS 2 9/12; postop had bleeding requiring PRBC, amicar, PLT, cryo. TTE 9/13 LVEDD 5.9 cm, mild-mod MR, aortic valve closed, mild RV dysfunction, mild TR. Hospital course c/b significant L sided pain; found to have L hemithorax s/p pigtail that was removed; fluid c/w exudative with lot of RBC, culture negative. Continues to have pain in L flank and frustration. Currently looks euvolemic. Labs noted for worsening hyponatremia with urine studies c/w likely SIADH possibly 2/2 pain. LVAD interrogated w/o events. Labs reviewed - INR 2.1, . Na stable. Currently compensated, stage D HF requiring HM2 as bridge to recovery. Pt with low NA Likely SIADH being followed by renal Briefly, 63 y/o Creole speaking male who presented with abdominal pain and was found to be in low output heart failure with cardiogenic shock (EF 10%) underwent LVAD/TV annuloplasty for INTERMACS 2 9/12; postop had bleeding requiring PRBC, amicar, PLT, cryo. TTE 9/13 LVEDD 5.9 cm, mild-mod MR, aortic valve closed, mild RV dysfunction, mild TR. Hospital course c/b significant L sided pain; found to have L hemithorax s/p pigtail that was removed; fluid c/w exudative with lot of RBC, culture negative. Continues to have pain in L flank and frustration. Currently looks euvolemic. Labs noted for worsening hyponatremia with urine studies c/w likely SIADH possibly 2/2 pain. LVAD interrogated w/o events. Labs reviewed - INR 2.1, . Na stable. Currently compensated, stage D HF requiring HM2 as bridge to recovery. Pt with low NA Likely SIADH being followed by renal improved today INR 2.9  Pt c/o aches and pains Briefly, 61 y/o Creole speaking male who presented with abdominal pain and was found to be in low output heart failure with cardiogenic shock (EF 10%) underwent LVAD/TV annuloplasty for INTERMACS 2 9/12; postop had bleeding requiring PRBC, amicar, PLT, cryo. TTE 9/13 LVEDD 5.9 cm, mild-mod MR, aortic valve closed, mild RV dysfunction, mild TR. Hospital course c/b significant L sided pain; found to have L hemithorax s/p pigtail that was removed; fluid c/w exudative with lot of RBC, culture negative. Continues to have pain in L flank and frustration. Currently looks euvolemic. Labs noted for worsening hyponatremia with urine studies c/w likely SIADH possibly 2/2 pain.  Currently compensated, stage D HF requiring HM2 as bridge to recovery. Pt with low NA likely SIADH being followed by renal improved today INR 2.9 Pt c/o aches and pains

## 2017-10-06 NOTE — PROGRESS NOTE ADULT - SUBJECTIVE AND OBJECTIVE BOX
[facility] DIVISION OF KIDNEY DISEASES AND HYPERTENSION -- FOLLOW UP NOTE  --------------------------------------------------------------------------------  Chief Complaint: Hyponatremia     24 hour events/subjective:  No acute events overnight       PAST HISTORY  --------------------------------------------------------------------------------  No significant changes to PMH, PSH, FHx, SHx, unless otherwise noted    ALLERGIES & MEDICATIONS  --------------------------------------------------------------------------------  Allergies    No Known Allergies    Intolerances      Standing Inpatient Medications  aspirin enteric coated 81 milliGRAM(s) Oral daily  glycerin Suppository - Adult 1 Suppository(s) Rectal every 4 hours  lidocaine   Patch 1 Patch Transdermal daily  lisinopril 5 milliGRAM(s) Oral daily  metoprolol succinate ER 12.5 milliGRAM(s) Oral daily  mirtazapine 15 milliGRAM(s) Oral at bedtime  oxyCODONE  ER Tablet 10 milliGRAM(s) Oral every 12 hours  pantoprazole    Tablet 40 milliGRAM(s) Oral before breakfast  polyethylene glycol 3350 17 Gram(s) Oral at bedtime  senna 2 Tablet(s) Oral at bedtime  warfarin 3 milliGRAM(s) Oral once    PRN Inpatient Medications  oxyCODONE    IR 5 milliGRAM(s) Oral every 6 hours PRN  traMADol 25 milliGRAM(s) Oral every 6 hours PRN      REVIEW OF SYSTEMS  --------------------------------------------------------------------------------  Gen: No weakness  Skin: No rashes  Head/Eyes/Ears/Mouth: No headache  Respiratory: No dyspnea  CV: No chest pain  GI: No abdominal pain  : No increased frequency  MSK: No edema  Neuro: No dizziness/lightheadedness    All other systems were reviewed and are negative, except as noted.    VITALS/PHYSICAL EXAM  --------------------------------------------------------------------------------  T(C): 36.8 (10-06-17 @ 10:00), Max: 36.9 (10-05-17 @ 22:00)  HR: 90 (10-06-17 @ 10:00) (90 - 94)  BP: --  RR: 18 (10-06-17 @ 10:00) (17 - 18)  SpO2: 97% (10-06-17 @ 10:00) (97% - 99%)  Wt(kg): --        10-05-17 @ 07:01  -  10-06-17 @ 07:00  --------------------------------------------------------  IN: 1010 mL / OUT: 1300 mL / NET: -290 mL    10-06-17 @ 07:01  -  10-06-17 @ 12:51  --------------------------------------------------------  IN: 420 mL / OUT: 0 mL / NET: 420 mL      Physical Exam:  	Gen: NAD, awake, responds slow to questions   	HEENT: supple neck  	Pulm: Decreased BS at bases B/L  	CV: RRR  	Abd: +BS, soft, nontender/nondistended  	LE: Warm, FROM, no edema  	Neuro: No focal deficits, intact gait  	Psych: flat affect   	Skin: Warm    LABS/STUDIES  --------------------------------------------------------------------------------              10.0   9.2   >-----------<  321      [10-06-17 @ 06:16]              30.3     129  |  92  |  19  ----------------------------<  92      [10-06-17 @ 06:16]  4.6   |  26  |  0.68        Ca     9.3     [10-06-17 @ 06:16]      PT/INR: PT 32.2 , INR 2.92       [10-06-17 @ 06:16]          [10-05-17 @ 06:18]    Creatinine Trend:  SCr 0.68 [10-06 @ 06:16]  SCr 0.60 [10-05 @ 06:18]  SCr 0.80 [10-04 @ 18:01]  SCr 0.65 [10-04 @ 06:02]  SCr 0.60 [10-03 @ 05:33]    Urinalysis - [10-04-17 @ 14:24]      Color Yellow / Appearance SL Turbid / SG 1.021 / pH 7.0      Gluc Negative / Ketone Negative  / Bili Negative / Urobili Negative       Blood Negative / Protein Negative / Leuk Est Negative / Nitrite Negative      RBC 0-2 / WBC 2-5 / Hyaline  / Gran  / Sq Epi  / Non Sq Epi  / Bacteria     Urine Creatinine 191      [10-03-17 @ 17:40]  Urine Sodium 112      [10-03-17 @ 17:40]  Urine Potassium >100      [10-03-17 @ 17:40]  Urine Chloride 90      [10-03-17 @ 17:40]  Urine Osmolality 784      [10-03-17 @ 17:40]    Iron 51, TIBC 261, %sat 20      [09-01-17 @ 21:40]  Ferritin 286.0      [09-01-17 @ 20:17]  HbA1c 6.2      [09-01-17 @ 00:08]  TSH 0.81      [10-05-17 @ 07:13]  Lipid: chol 185, TG 67, HDL 49,       [08-26-17 @ 08:52]

## 2017-10-06 NOTE — CHART NOTE - NSCHARTNOTEFT_GEN_A_CORE
Source: Patient [X ]    Family [ ]     other [ X] RN, NP, Medical record     Nutrition follow up for calorie count review. Estimated intake below...  Day 1 10/3: 827kcals and 48gm protein   Day 2 10/4: 584kcals and 32.3fm protein    Day 3 10/5: 410kcals and 31gm protein  3 days average: 331.6kcals and 37gm protein. Pt meets 22% if estimated calorie needs and 61% of estimated protein needs, (based on lower end of estimated nutrient needs)     Calorie count results discussed with NP. RD encouraged adequate PO intake, provided menu selection assistance and reviewed food preferences.  Per chart, Pt seen by endocrinology for hyponatremia, ? SIADH,     Diet : Regular, Ensure Enlive x3      Patient reports no GI distress      Source for PO intake [X ] Patient [ X] 3 day calorie count       Current Weight: 52.8kg, Wt continues trending down.   % Weight Change    Pertinent Medications: MEDICATIONS  (STANDING):  aspirin enteric coated 81 milliGRAM(s) Oral daily  glycerin Suppository - Adult 1 Suppository(s) Rectal every 4 hours  lidocaine   Patch 1 Patch Transdermal daily  lisinopril 5 milliGRAM(s) Oral daily  metoprolol succinate ER 12.5 milliGRAM(s) Oral daily  mirtazapine 15 milliGRAM(s) Oral at bedtime  oxyCODONE  ER Tablet 10 milliGRAM(s) Oral every 12 hours  pantoprazole    Tablet 40 milliGRAM(s) Oral before breakfast  polyethylene glycol 3350 17 Gram(s) Oral at bedtime  senna 2 Tablet(s) Oral at bedtime  warfarin 3 milliGRAM(s) Oral once    MEDICATIONS  (PRN):  oxyCODONE    IR 5 milliGRAM(s) Oral every 6 hours PRN Severe Pain (7 - 10)  traMADol 25 milliGRAM(s) Oral every 6 hours PRN Severe Pain (7 - 10)    Pertinent Labs: H/H: 10.0/30.3-low, Na: 129-low, K:4.6, Ch: 92-low, BUN: 19, Cr: 0.65, Glu: 92, shantelle: 93.     Skin: intact     Estimated Needs:   [x]no change since previous assessment  [ ] recalculated:       Previous Nutrition Diagnosis:      [X ] Increased Nutrient Needs    [X] Food & Nutrition Related Knowledge Deficit   [ X] Malnutrition          Nutrition Diagnosis is [ X] ongoing        New Nutrition Diagnosis: [ X] not applicable      Recommend    1. Provide food preferences as requested by Pt/family within diet restrictions    2. Encourage PO intake during meal times  3. Continue liberalized diet  4. Continue Ensure enlive x3  5. Consider Nutrition support if PO intake remains suboptimal   6. Education deferred at this time.      Monitoring and Evaluation:     [ X] PO intake [ X] Tolerance to diet prescription [X ] weights [X ] follow up per protocol    [ X] other: RD remains available Sarah Siegler RD. Pager #[phone]

## 2017-10-06 NOTE — PROGRESS NOTE ADULT - PROBLEM SELECTOR PLAN 2
c/w Oxy ER q 12   increase frequency to Oxy IR q4 prn c/w Oxy ER 10mg q 12   increase frequency to Oxy IR 5mg q4 prn

## 2017-10-06 NOTE — PROGRESS NOTE ADULT - PROBLEM SELECTOR PLAN 4
psych consult appreciate   continue  lisa psych consult appreciate   continue  remoran psych consult appreciate   continue  remoran for sleep

## 2017-10-06 NOTE — PROGRESS NOTE ADULT - ASSESSMENT
63 yo male with no PMHx presented with sob and abdominal pain  on 8/25/17.  Cta - for PE,  cardiac mRI and cath revealed   lv dysfunction and MR.  Cardiology 1. Daily Shower  2. Weight yourself daily and notify any weight gain greater than 2-3 pounds in 24 hours.  3. Regular diet - low fat, low cholesterol, no added salt.  4. Cleanse Midsternal incision and leg incision daily while showering with warm water and mild soap, pat dry and maintain open to air.   5. Follow Cardiac Surgery Do's and Don'ts discharge instructions.   6. No driving until cleared by MD.   7. No heavy lifting nothing greater than 5 pounds until cleared by MD.   8. Call / Notify MD any fever greater than 101.0  9. Increase Activity as tolerated.as consulted, HF service consulted and the pt was placed in ccu with pulmonary edema. He had  low cardiac output and cardiogenic shock, and was  referred  for LVAD evaluation. ID was consulted to clear the patient for surgery.. GI was following for abd pain, no significant findings on virtual colonoscopy. He was treated for chf  with  afterload reduction  and milrinone and stabilized, euvolemic .  9/12/17 S/P Implantation of HeartMate II left ventricular assist device through median sternotomy, and repair of the tricuspid valve with a number 28 classic Christianson ring.  Post op he required inotropic support and PRBc's for acute blood loss anemia  He was transferred to sdu  9/18  He has c/o pain at the incision site and chest tube site  Chest tube to be d/c  9/20  Pain improved .  9/21 INR drifted to 1.8, Heparin started.  Lisinopril increased for BP and afterload  Leukocytosis, afebrile  Hyponatremia, off lasix  9/23 Leukocytosis WBC 19  CT chest/ab/plevis ordered  blood cx x2  ID consulted fluid bolus .9  per Dr Iyer low sodium, diet d/c regular diet ordered INR 2.08 heparin gtt d/c coumadin 5 tonight  9/24 cta done 2nd limited study prior.  Abdominal discomfort relieved with pain meds, d/w pain service, oxycontin recommended .  Ct scan with contrast reviewed by Dr Cadet.  Pt with loculated LLL effusion vs atelectasis, wbc improving but remains on antibiotics.  The coumadin was low dosed , awaiting IR tap of the loculated effusion and cultures to be sent.  Low map, ivf and decrease lisinopril  The IR procedure was cancelled by IR yesterday and he is still awaiting the thoracentisis.  The INR was below 2.0, heparin started.  9/27 pigtail placed in IR>300cc  with cultures neg to date.  F/u cultures, chest tube to water seal today , possible d/c later today.  9/30 Heparin Gtt  coumadin dosed daily ambulated today  10/2 transfer to floor Increase ambulation  10/4 renal consulted for persistent hyponatremia. Bladder scan no residual. Beta-blocker added.   10/5  awaiting rehab bed  10/6  Endocrine called for management of TFT'S and cortisol.  Pain meds adjusted.  Hyponatremia slowly improving.  Coumadin dosed 3mg per HF team

## 2017-10-06 NOTE — PROGRESS NOTE ADULT - ASSESSMENT
Briefly, 61 y/o Creole speaking male who presented with abdominal pain and was found to be in low output heart failure with cardiogenic shock (EF 10%) underwent LVAD/TV annuloplasty for INTERMACS 2 9/12; postop had bleeding requiring PRBC, amicar, PLT, cryo. TTE 9/13 LVEDD 5.9 cm, mild-mod MR, aortic valve closed, mild RV dysfunction, mild TR. Hospital course c/b significant L sided pain; found to have L hemithorax s/p pigtail that was removed; fluid c/w exudative with lot of RBC, culture negative. Continues to have pain in L flank and frustration. Currently looks euvolemic. Labs noted for worsening hyponatremia with urine studies c/w likely SIADH possibly 2/2 pain.  Currently compensated, stage D HF requiring HM2 as bridge to recovery. Pt with low NA likely SIADH being followed by renal improved today INR 2.9 Pt c/o aches and pains

## 2017-10-06 NOTE — CONSULT NOTE ADULT - SUBJECTIVE AND OBJECTIVE BOX
HPI:  62M with no PMHx here with chest pain. Began last week, described as pleuritic, worsens with cough and deep inspiration. Has been having SOB as well, dry cough. Recently vacationed in [facility] came back today. Saw doctor in [facility] but unclear what workup was. No fever, sick contacts, abd pain. CP described as midsternal, nonradiating. Has 25 pack year smoking history.  CTPA does not reveal PE, Pulm congestion noted. (25 Aug 2017 22:27)  Patient apparently has no history of thyroid disease, has cardiomyopathy, still SOB, poor po intake, no fever.    PAST MEDICAL & SURGICAL HISTORY:  No pertinent past medical history  No significant past surgical history      FAMILY HISTORY:  No pertinent family history in first degree relatives      Social History:    Outpatient Medications:    MEDICATIONS  (STANDING):  aspirin enteric coated 81 milliGRAM(s) Oral daily  glycerin Suppository - Adult 1 Suppository(s) Rectal every 4 hours  lidocaine   Patch 1 Patch Transdermal daily  lisinopril 5 milliGRAM(s) Oral daily  metoprolol succinate ER 12.5 milliGRAM(s) Oral daily  mirtazapine 15 milliGRAM(s) Oral at bedtime  oxyCODONE  ER Tablet 10 milliGRAM(s) Oral every 12 hours  pantoprazole    Tablet 40 milliGRAM(s) Oral before breakfast  polyethylene glycol 3350 17 Gram(s) Oral at bedtime  senna 2 Tablet(s) Oral at bedtime    MEDICATIONS  (PRN):  oxyCODONE    IR 5 milliGRAM(s) Oral every 6 hours PRN Severe Pain (7 - 10)      Allergies    No Known Allergies    Intolerances      Review of Systems:  Constitutional: No fever, no chills  Eyes: No blurry vision  Neuro: No tremors  HEENT: No pain, no neck swelling  Cardiovascular: No chest pain, no palpitations  Respiratory: Has SOB, no cough  GI: No nausea, vomiting, abdominal pain  : No dysuria  Skin: no rash  MSK: Has leg swelling, no foot ulcers.  Psych: no depression  Endocrine: no polyuria, polydipsia    ALL OTHER SYSTEMS REVIEWED AND NEGATIVE    UNABLE TO OBTAIN    PHYSICAL EXAM:  VITALS: T(C): 36.9 (10-06-17 @ 06:00)  T(F): 98.4 (10-06-17 @ 06:00), Max: 98.5 (10-05-17 @ 22:00)  HR: 90 (10-06-17 @ 06:00) (90 - 94)  BP: --  RR:  (17 - 18)  SpO2:  (98% - 99%)  Wt(kg): --  GENERAL: NAD, well-groomed, well-developed  EYES: No proptosis, no lid lag  HEENT:  Atraumatic, Normocephalic  THYROID: Normal size, no palpable nodules  RESPIRATORY: Clear to auscultation bilaterally; No rales, rhonchi, wheezing  CARDIOVASCULAR: Si S2, No murmurs;  GI: Soft, non distended, normal bowel sounds  SKIN: Dry, intact, No rashes or lesions  MUSCULOSKELETAL: Has BL lower extremity edema.  NEURO:  no tremor, sensation decreased in feet BL,    CAPILLARY BLOOD GLUCOSE                            10.0   9.2   )-----------( 321      ( 06 Oct 2017 06:16 )             30.3       10-06    129<L>  |  92<L>  |  19  ----------------------------<  92  4.6   |  26  |  0.68    EGFR if : 119  EGFR if non : 103    Ca    9.3      10-06        Thyroid Function Tests:  10-05 @ 07:13 TSH 0.81 FreeT4 -- T3 72 Anti TPO -- Anti Thyroglobulin Ab -- TSI --      Hemoglobin A1C, Whole Blood: 6.2 % <H> [4.0 - 5.6] (09-01-17 @ 00:08)          Radiology:

## 2017-10-06 NOTE — PROGRESS NOTE ADULT - PROBLEM SELECTOR PLAN 2
daily LDH   coumadin daily LDH   coumadin 3mg tonight  pain management for discomfort will add Tramadol 25 mg po q6 hr for moderate pain

## 2017-10-07 LAB
ANION GAP SERPL CALC-SCNC: 10 MMOL/L — SIGNIFICANT CHANGE UP (ref 5–17)
BUN SERPL-MCNC: 23 MG/DL — SIGNIFICANT CHANGE UP (ref 7–23)
CALCIUM SERPL-MCNC: 9.3 MG/DL — SIGNIFICANT CHANGE UP (ref 8.4–10.5)
CHLORIDE SERPL-SCNC: 94 MMOL/L — LOW (ref 96–108)
CO2 SERPL-SCNC: 26 MMOL/L — SIGNIFICANT CHANGE UP (ref 22–31)
CREAT SERPL-MCNC: 0.61 MG/DL — SIGNIFICANT CHANGE UP (ref 0.5–1.3)
GLUCOSE SERPL-MCNC: 111 MG/DL — HIGH (ref 70–99)
HCT VFR BLD CALC: 28.8 % — LOW (ref 39–50)
HGB BLD-MCNC: 9.9 G/DL — LOW (ref 13–17)
INR BLD: 3.02 RATIO — HIGH (ref 0.88–1.16)
LDH SERPL L TO P-CCNC: 335 U/L — HIGH (ref 50–242)
MCHC RBC-ENTMCNC: 32.9 PG — SIGNIFICANT CHANGE UP (ref 27–34)
MCHC RBC-ENTMCNC: 34.4 GM/DL — SIGNIFICANT CHANGE UP (ref 32–36)
MCV RBC AUTO: 95.6 FL — SIGNIFICANT CHANGE UP (ref 80–100)
PLATELET # BLD AUTO: 282 K/UL — SIGNIFICANT CHANGE UP (ref 150–400)
POTASSIUM SERPL-MCNC: 4.4 MMOL/L — SIGNIFICANT CHANGE UP (ref 3.5–5.3)
POTASSIUM SERPL-SCNC: 4.4 MMOL/L — SIGNIFICANT CHANGE UP (ref 3.5–5.3)
PROTHROM AB SERPL-ACNC: 33.7 SEC — HIGH (ref 9.8–12.7)
RBC # BLD: 3.01 M/UL — LOW (ref 4.2–5.8)
RBC # FLD: 13.3 % — SIGNIFICANT CHANGE UP (ref 10.3–14.5)
SODIUM SERPL-SCNC: 130 MMOL/L — LOW (ref 135–145)
WBC # BLD: 8 K/UL — SIGNIFICANT CHANGE UP (ref 3.8–10.5)
WBC # FLD AUTO: 8 K/UL — SIGNIFICANT CHANGE UP (ref 3.8–10.5)

## 2017-10-07 PROCEDURE — 93750 INTERROGATION VAD IN PERSON: CPT

## 2017-10-07 PROCEDURE — 99233 SBSQ HOSP IP/OBS HIGH 50: CPT | Mod: 25

## 2017-10-07 RX ORDER — LISINOPRIL 2.5 MG/1
2.5 TABLET ORAL DAILY
Qty: 0 | Refills: 0 | Status: DISCONTINUED | OUTPATIENT
Start: 2017-10-07 | End: 2017-10-09

## 2017-10-07 RX ORDER — SODIUM CHLORIDE 9 MG/ML
250 INJECTION INTRAMUSCULAR; INTRAVENOUS; SUBCUTANEOUS ONCE
Qty: 0 | Refills: 0 | Status: COMPLETED | OUTPATIENT
Start: 2017-10-07 | End: 2017-10-07

## 2017-10-07 RX ORDER — WARFARIN SODIUM 2.5 MG/1
3 TABLET ORAL ONCE
Qty: 0 | Refills: 0 | Status: COMPLETED | OUTPATIENT
Start: 2017-10-07 | End: 2017-10-07

## 2017-10-07 RX ADMIN — PANTOPRAZOLE SODIUM 40 MILLIGRAM(S): 20 TABLET, DELAYED RELEASE ORAL at 05:58

## 2017-10-07 RX ADMIN — LISINOPRIL 5 MILLIGRAM(S): 2.5 TABLET ORAL at 05:57

## 2017-10-07 RX ADMIN — SENNA PLUS 2 TABLET(S): 8.6 TABLET ORAL at 21:10

## 2017-10-07 RX ADMIN — OXYCODONE HYDROCHLORIDE 5 MILLIGRAM(S): 5 TABLET ORAL at 13:00

## 2017-10-07 RX ADMIN — OXYCODONE HYDROCHLORIDE 10 MILLIGRAM(S): 5 TABLET ORAL at 18:20

## 2017-10-07 RX ADMIN — WARFARIN SODIUM 3 MILLIGRAM(S): 2.5 TABLET ORAL at 21:09

## 2017-10-07 RX ADMIN — OXYCODONE HYDROCHLORIDE 5 MILLIGRAM(S): 5 TABLET ORAL at 12:05

## 2017-10-07 RX ADMIN — Medication 12.5 MILLIGRAM(S): at 05:57

## 2017-10-07 RX ADMIN — SODIUM CHLORIDE 500 MILLILITER(S): 9 INJECTION INTRAMUSCULAR; INTRAVENOUS; SUBCUTANEOUS at 17:00

## 2017-10-07 RX ADMIN — Medication 81 MILLIGRAM(S): at 12:05

## 2017-10-07 RX ADMIN — OXYCODONE HYDROCHLORIDE 10 MILLIGRAM(S): 5 TABLET ORAL at 05:57

## 2017-10-07 RX ADMIN — MIRTAZAPINE 7.5 MILLIGRAM(S): 45 TABLET, ORALLY DISINTEGRATING ORAL at 21:09

## 2017-10-07 RX ADMIN — OXYCODONE HYDROCHLORIDE 10 MILLIGRAM(S): 5 TABLET ORAL at 17:48

## 2017-10-07 RX ADMIN — OXYCODONE HYDROCHLORIDE 10 MILLIGRAM(S): 5 TABLET ORAL at 06:27

## 2017-10-07 NOTE — PROGRESS NOTE ADULT - ASSESSMENT
Assessment  Hyponatremia: Patient is euthyroid and not adrenally insufficient, has SIADH, on fluid restriction, FU   CHF: continue treatment, cardiology team FU

## 2017-10-07 NOTE — PROGRESS NOTE ADULT - SUBJECTIVE AND OBJECTIVE BOX
Interval History:  doing well; still c/o pain but ambulated on his own    Medications:  aspirin enteric coated 81 milliGRAM(s) Oral daily  glycerin Suppository - Adult 1 Suppository(s) Rectal every 4 hours  lidocaine   Patch 1 Patch Transdermal daily  lisinopril 2.5 milliGRAM(s) Oral daily  metoprolol succinate ER 12.5 milliGRAM(s) Oral daily  mirtazapine 7.5 milliGRAM(s) Oral at bedtime  oxyCODONE    IR 5 milliGRAM(s) Oral every 6 hours PRN  oxyCODONE  ER Tablet 10 milliGRAM(s) Oral every 12 hours  pantoprazole    Tablet 40 milliGRAM(s) Oral before breakfast  polyethylene glycol 3350 17 Gram(s) Oral at bedtime  senna 2 Tablet(s) Oral at bedtime  traMADol 25 milliGRAM(s) Oral every 6 hours PRN  warfarin 3 milliGRAM(s) Oral once    Vitals:  T(C): 36.4 (10-07-17 @ 12:00), Max: 36.8 (10-06-17 @ 18:00)  HR: 91 (10-07-17 @ 12:00) (91 - 94)  BP(mean): 62 (10-07-17 @ 12:00) (62 - 78)  RR: 18 (10-07-17 @ 12:00) (16 - 18)  SpO2: 98% (10-07-17 @ 12:00) (96% - 100%)    Daily     I&O's Summary    06 Oct 2017 07:01  -  07 Oct 2017 07:00  --------------------------------------------------------  IN: 1100 mL / OUT: 1100 mL / NET: 0 mL    07 Oct 2017 07:01  -  07 Oct 2017 14:26  --------------------------------------------------------  IN: 420 mL / OUT: 0 mL / NET: 420 mL    Physical Exam:  Appearance: No Acute Distress  HEENT: No JVD  Cardiovascular: Normal S1 S2, No murmurs/rubs/gallops. LVAD hum  Respiratory: Clear to auscultation bilaterally  Gastrointestinal: Soft, Non-tender	  Skin: No cyanosis	  Neurologic: Non-focal  Extremities: No LE edema  Psychiatry: A & O x 3, Mood & affect appropriate    LVAD Interrogation:  Pump Flow: 9000  Pump Speed: 5.1  Pulse Index: 3.8  Pump Power: 5.3  VAD Events: no events  Driveline evaluation: no c/d/i  Programming Changes: No changes made    Labs:                        9.9    8.0   )-----------( 282      ( 07 Oct 2017 05:07 )             28.8     10-07    130<L>  |  94<L>  |  23  ----------------------------<  111<H>  4.4   |  26  |  0.61    Ca    9.3      07 Oct 2017 05:07    PT/INR - ( 07 Oct 2017 05:07 )   PT: 33.7 sec;   INR: 3.02 ratio      Lactate Dehydrogenase, Serum: 335 U/L (10-07 @ 05:07)  Lactate Dehydrogenase, Serum: 378 U/L (10-05 @ 06:18)    TELEMETRY:  no events

## 2017-10-07 NOTE — PROGRESS NOTE ADULT - SUBJECTIVE AND OBJECTIVE BOX
VITAL SIGNS    Telemetry:  rsr    Vital Signs Last 24 Hrs  T(C): 36.4 (10-07-17 @ 12:00), Max: 36.8 (10-06-17 @ 18:00)  T(F): 97.6 (10-07-17 @ 12:00), Max: 98.2 (10-06-17 @ 18:00)  HR: 91 (10-07-17 @ 12:00) (91 - 94)  BP: --  RR: 18 (10-07-17 @ 12:00) (16 - 18)  SpO2: 98% (10-07-17 @ 12:00) (96% - 100%)            10-06 @ 07:01  -  10-07 @ 07:00  --------------------------------------------------------  IN: 1100 mL / OUT: 1100 mL / NET: 0 mL    10-07 @ 07:01  -  10-07 @ 14:15  --------------------------------------------------------  IN: 420 mL / OUT: 0 mL / NET: 420 mL       Daily     Daily   Admit Wt: Drug Dosing Weight  Height (cm): 177.8 (31 Aug 2017 18:00)  Weight (kg): 60 (31 Aug 2017 18:00)  BMI (kg/m2): 19 (31 Aug 2017 18:00)  BSA (m2): 1.75 (31 Aug 2017 18:00)      CAPILLARY BLOOD GLUCOSE              aspirin enteric coated 81 milliGRAM(s) Oral daily  glycerin Suppository - Adult 1 Suppository(s) Rectal every 4 hours  lidocaine   Patch 1 Patch Transdermal daily  lisinopril 2.5 milliGRAM(s) Oral daily  metoprolol succinate ER 12.5 milliGRAM(s) Oral daily  mirtazapine 7.5 milliGRAM(s) Oral at bedtime  oxyCODONE    IR 5 milliGRAM(s) Oral every 6 hours PRN  oxyCODONE  ER Tablet 10 milliGRAM(s) Oral every 12 hours  pantoprazole    Tablet 40 milliGRAM(s) Oral before breakfast  polyethylene glycol 3350 17 Gram(s) Oral at bedtime  senna 2 Tablet(s) Oral at bedtime  traMADol 25 milliGRAM(s) Oral every 6 hours PRN  warfarin 3 milliGRAM(s) Oral once      PHYSICAL EXAM    Subjective: "I feel ok."   Neurology: alert and oriented x 3, nonfocal, no gross deficits  CV : tele:  rsr    midsternal incision cdi, well- healed   Lungs: clear. RR easy, unlabored   Abdomen: soft, nontender, nondistended, positive bowel sounds, + bowel movement   Neg N/V/D   drive line cdi with dressing   :  pt voiding without difficulty   Extremities:   BLANDON; neg LE edema, neg calf tenderness.   PPP bilaterally

## 2017-10-07 NOTE — PROGRESS NOTE ADULT - ASSESSMENT
Briefly, 61 y/o Creole speaking male who presented with abdominal pain and was found to be in low output heart failure with cardiogenic shock (EF 10%) underwent LVAD/TV annuloplasty for INTERMACS 2 9/12; postop had bleeding requiring PRBC, amicar, PLT, cryo. TTE 9/13 LVEDD 5.9 cm, mild-mod MR, aortic valve closed, mild RV dysfunction, mild TR. Hospital course c/b significant L sided pain; found to have L hemithorax s/p pigtail that was removed; fluid c/w exudative with lot of RBC, culture negative. Continues to have pain in L flank and frustration. Currently looks euvolemic. Labs noted for stable hyponatremia with urine studies c/w likely SIADH possibly 2/2 pain. LVAD interrogated w/o events. Labs reviewed - INR 3.02, . Na stable. Currently compensated, stage D HF requiring HM2 as bridge to recovery.   - MAPs low; reduce lisinopril 2.5 mg daily; continue for now; goal MAP 70-80  - continue toprol XL 12.5 mg daily  - appreciate renal input; likely SIADH; TSH decreased from prior however nl T4 likely sick euthyroid  - cortisol appropriate at 20   - free water restrict  - ambulate as tolerated  - coumadin 3 mg today; continue ASA 81 mg   - trial of tramadol for moderate pain; oxycodone for severe pain; emphasized to patient to request pain med  - appreciate psych input  - dispo planning in process; LVAD education .

## 2017-10-07 NOTE — PROGRESS NOTE ADULT - PROBLEM SELECTOR PLAN 3
Continue aspirin 81 mg daily  Daily Coumadin dosing and PT/INR- 2.p2 Continue aspirin 81 mg daily  Daily Coumadin dosing and PT/INR-

## 2017-10-07 NOTE — PROGRESS NOTE ADULT - PROBLEM SELECTOR PLAN 1
Continue lisinopril 5 mg QD  Add Toprol XL 12.5 daily per HF.  Tramadol added for pain per HF decrease lisinopril 2.5 mg QD  Add Toprol XL 12.5 daily per HF.  Tramadol added for pain per HF

## 2017-10-07 NOTE — PROGRESS NOTE ADULT - ASSESSMENT
Briefly, 63 y/o Creole speaking male who presented with abdominal pain and was found to be in low output heart failure with cardiogenic shock (EF 10%) underwent LVAD/TV annuloplasty for INTERMACS 2 9/12; postop had bleeding requiring PRBC, amicar, PLT, cryo. TTE 9/13 LVEDD 5.9 cm, mild-mod MR, aortic valve closed, mild RV dysfunction, mild TR. Hospital course c/b significant L sided pain; found to have L hemithorax s/p pigtail that was removed; fluid c/w exudative with lot of RBC, culture negative. Continues to have pain in L flank and frustration. Currently looks euvolemic. Labs noted for worsening hyponatremia - renal consulted- urine studies sent.  LVAD interrogated w/o events. Labs reviewed - INR 3.0  . Na stable. Currently compensated, stage D HF requiring HM2 as bridge to recovery.   - on lisinopril 5 mg daily; decrease to 2.5 today as per CHF team- Map 60's at times.   - continue toprol XL 12.5 mg daily  - appreciate renal input; likely SIADH; TSH decreased from prior however nl T4 likely sick euthyroid  - cortisol appropriate at 20   - free water restrict  - ambulate as tolerated  - continue coumadin 6 mg and ASA 81 mg daily  - appreciate psych input  - discharge planning- rehab next week; LVAD education

## 2017-10-07 NOTE — PROGRESS NOTE ADULT - PROBLEM SELECTOR PLAN 6
Pt asymptomatic, F/U urine lytes/UA.  Renal recommends Endocrine consult for TFT's and cortisol Pt asymptomatic, F/U urine lytes/UA.  fluid restriction

## 2017-10-07 NOTE — PROGRESS NOTE ADULT - SUBJECTIVE AND OBJECTIVE BOX
Chief complaint  Patient is a 62y old  Male who presents with a chief complaint of SOOB (25 Aug 2017 22:27)   Review of systems  Patient in bed, comfortable, no fever,  no hypoglycemia.    Labs and Fingersticks    CAPILLARY BLOOD GLUCOSE    Anion Gap, Serum: 10 (10-07 @ 05:07)  Anion Gap, Serum: 11 (10-06 @ 06:16)      Calcium, Total Serum: 9.3 (10-07 @ 05:07)  Calcium, Total Serum: 9.3 (10-06 @ 06:16)          10-07    130<L>  |  94<L>  |  23  ----------------------------<  111<H>  4.4   |  26  |  0.61    Ca    9.3      07 Oct 2017 05:07                          9.9    8.0   )-----------( 282      ( 07 Oct 2017 05:07 )             28.8     Medications  MEDICATIONS  (STANDING):  aspirin enteric coated 81 milliGRAM(s) Oral daily  glycerin Suppository - Adult 1 Suppository(s) Rectal every 4 hours  lidocaine   Patch 1 Patch Transdermal daily  lisinopril 2.5 milliGRAM(s) Oral daily  metoprolol succinate ER 12.5 milliGRAM(s) Oral daily  mirtazapine 7.5 milliGRAM(s) Oral at bedtime  oxyCODONE  ER Tablet 10 milliGRAM(s) Oral every 12 hours  pantoprazole    Tablet 40 milliGRAM(s) Oral before breakfast  polyethylene glycol 3350 17 Gram(s) Oral at bedtime  senna 2 Tablet(s) Oral at bedtime  warfarin 3 milliGRAM(s) Oral once      Physical Exam  General: Patient comfortable in bed  Vital Signs Last 12 Hrs  T(F): 97.5 (10-07-17 @ 16:00), Max: 97.7 (10-07-17 @ 08:09)  HR: 94 (10-07-17 @ 16:00) (91 - 94)  BP: --  BP(mean): 60 (10-07-17 @ 16:00) (60 - 68)  RR: 18 (10-07-17 @ 16:00) (18 - 18)  SpO2: 99% (10-07-17 @ 16:00) (98% - 100%)  Neck: No palpable thyroid nodules.  CVS: S1S2, No murmurs  Respiratory: No wheezing, no crepitations  GI: Abdomen soft, bowel sounds positive  Musculoskeletal: Positive edema lower extremities bilaterally  Skin: No skin rashes, no ecchymosis    Diagnostics    Osmolality, Random Urine: Routine (10-06 @ 10:48)  Osmolality, Serum: Routine (10-06 @ 10:48)  Sodium, Random Urine: Routine (10-06 @ 10:48)

## 2017-10-08 LAB
ANION GAP SERPL CALC-SCNC: 6 MMOL/L — SIGNIFICANT CHANGE UP (ref 5–17)
BUN SERPL-MCNC: 19 MG/DL — SIGNIFICANT CHANGE UP (ref 7–23)
CALCIUM SERPL-MCNC: 9.4 MG/DL — SIGNIFICANT CHANGE UP (ref 8.4–10.5)
CHLORIDE SERPL-SCNC: 94 MMOL/L — LOW (ref 96–108)
CO2 SERPL-SCNC: 29 MMOL/L — SIGNIFICANT CHANGE UP (ref 22–31)
CREAT SERPL-MCNC: 0.69 MG/DL — SIGNIFICANT CHANGE UP (ref 0.5–1.3)
GLUCOSE SERPL-MCNC: 94 MG/DL — SIGNIFICANT CHANGE UP (ref 70–99)
HCT VFR BLD CALC: 29.4 % — LOW (ref 39–50)
HGB BLD-MCNC: 9.9 G/DL — LOW (ref 13–17)
INR BLD: 2.75 RATIO — HIGH (ref 0.88–1.16)
LDH SERPL L TO P-CCNC: 351 U/L — HIGH (ref 50–242)
MCHC RBC-ENTMCNC: 32.3 PG — SIGNIFICANT CHANGE UP (ref 27–34)
MCHC RBC-ENTMCNC: 33.6 GM/DL — SIGNIFICANT CHANGE UP (ref 32–36)
MCV RBC AUTO: 96 FL — SIGNIFICANT CHANGE UP (ref 80–100)
PLATELET # BLD AUTO: 264 K/UL — SIGNIFICANT CHANGE UP (ref 150–400)
POTASSIUM SERPL-MCNC: 4.7 MMOL/L — SIGNIFICANT CHANGE UP (ref 3.5–5.3)
POTASSIUM SERPL-SCNC: 4.7 MMOL/L — SIGNIFICANT CHANGE UP (ref 3.5–5.3)
PROTHROM AB SERPL-ACNC: 30.3 SEC — HIGH (ref 9.8–12.7)
RBC # BLD: 3.06 M/UL — LOW (ref 4.2–5.8)
RBC # FLD: 13.2 % — SIGNIFICANT CHANGE UP (ref 10.3–14.5)
SODIUM SERPL-SCNC: 129 MMOL/L — LOW (ref 135–145)
WBC # BLD: 8 K/UL — SIGNIFICANT CHANGE UP (ref 3.8–10.5)
WBC # FLD AUTO: 8 K/UL — SIGNIFICANT CHANGE UP (ref 3.8–10.5)

## 2017-10-08 PROCEDURE — 99233 SBSQ HOSP IP/OBS HIGH 50: CPT | Mod: 25

## 2017-10-08 PROCEDURE — 93750 INTERROGATION VAD IN PERSON: CPT

## 2017-10-08 RX ORDER — WARFARIN SODIUM 2.5 MG/1
4 TABLET ORAL ONCE
Qty: 0 | Refills: 0 | Status: COMPLETED | OUTPATIENT
Start: 2017-10-08 | End: 2017-10-08

## 2017-10-08 RX ADMIN — OXYCODONE HYDROCHLORIDE 5 MILLIGRAM(S): 5 TABLET ORAL at 01:24

## 2017-10-08 RX ADMIN — LISINOPRIL 2.5 MILLIGRAM(S): 2.5 TABLET ORAL at 05:41

## 2017-10-08 RX ADMIN — Medication 12.5 MILLIGRAM(S): at 05:41

## 2017-10-08 RX ADMIN — OXYCODONE HYDROCHLORIDE 5 MILLIGRAM(S): 5 TABLET ORAL at 01:54

## 2017-10-08 RX ADMIN — OXYCODONE HYDROCHLORIDE 10 MILLIGRAM(S): 5 TABLET ORAL at 06:26

## 2017-10-08 RX ADMIN — OXYCODONE HYDROCHLORIDE 10 MILLIGRAM(S): 5 TABLET ORAL at 18:20

## 2017-10-08 RX ADMIN — WARFARIN SODIUM 4 MILLIGRAM(S): 2.5 TABLET ORAL at 22:29

## 2017-10-08 RX ADMIN — OXYCODONE HYDROCHLORIDE 10 MILLIGRAM(S): 5 TABLET ORAL at 17:20

## 2017-10-08 RX ADMIN — OXYCODONE HYDROCHLORIDE 10 MILLIGRAM(S): 5 TABLET ORAL at 05:41

## 2017-10-08 RX ADMIN — PANTOPRAZOLE SODIUM 40 MILLIGRAM(S): 20 TABLET, DELAYED RELEASE ORAL at 05:41

## 2017-10-08 RX ADMIN — MIRTAZAPINE 7.5 MILLIGRAM(S): 45 TABLET, ORALLY DISINTEGRATING ORAL at 22:30

## 2017-10-08 RX ADMIN — SENNA PLUS 2 TABLET(S): 8.6 TABLET ORAL at 22:29

## 2017-10-08 RX ADMIN — Medication 81 MILLIGRAM(S): at 12:04

## 2017-10-08 NOTE — PROGRESS NOTE ADULT - ASSESSMENT
Briefly, 63 y/o Creole speaking male who presented with abdominal pain and was found to be in low output heart failure with cardiogenic shock (EF 10%) underwent LVAD/TV annuloplasty for INTERMACS 2 9/12; postop had bleeding requiring PRBC, amicar, PLT, cryo. TTE 9/13 LVEDD 5.9 cm, mild-mod MR, aortic valve closed, mild RV dysfunction, mild TR. Hospital course c/b significant L sided pain; found to have L hemithorax s/p pigtail that was removed; fluid c/w exudative with lot of RBC, culture negative. Continues to have pain in L flank and frustration. Currently looks euvolemic. Labs noted for stable hyponatremia with urine studies c/w likely SIADH possibly 2/2 pain. LVAD interrogated w/o events. Labs reviewed - INR 3.02, . Na stable. Currently compensated, stage D HF requiring HM2 as bridge to recovery.   - MAPs low; reduce lisinopril 2.5 mg daily; continue for now; goal MAP 70-80  - continue toprol XL 12.5 mg daily  - appreciate renal input; likely SIADH; TSH decreased from prior however nl T4 likely sick euthyroid  - cortisol appropriate at 20   - free water restrict  - ambulate as tolerated  - coumadin 3 mg today; continue ASA 81 mg   - trial of tramadol for moderate pain; oxycodone for severe pain; emphasized to patient to request pain med  - appreciate psych input  - dispo planning in process; LVAD education . Briefly, 61 y/o Creole speaking male who presented with abdominal pain and was found to be in low output heart failure with cardiogenic shock (EF 10%) underwent LVAD/TV annuloplasty for INTERMACS 2 9/12; postop had bleeding requiring PRBC, amicar, PLT, cryo. TTE 9/13 LVEDD 5.9 cm, mild-mod MR, aortic valve closed, mild RV dysfunction, mild TR. Hospital course c/b significant L sided pain; found to have L hemithorax s/p pigtail that was removed; fluid c/w exudative with lot of RBC, culture negative. Continues to have pain in L flank and frustration. Currently looks euvolemic. Labs noted for stable hyponatremia with urine studies c/w likely SIADH possibly 2/2 pain. LVAD interrogated w/o events. Labs reviewed - INR 3.02, . Na stable. Currently compensated, stage D HF requiring HM2 as bridge to recovery.   - MAPs low; reduce lisinopril 2.5 mg daily; continue for now; goal MAP 70-80  - continue toprol XL 12.5 mg daily  - appreciate renal input; likely SIADH; TSH decreased from prior however nl T4 likely sick euthyroid  - cortisol appropriate at 20   - free water restrict  - ambulate as tolerated  - coumadin 4 mg today; continue ASA 81 mg   - trial of tramadol for moderate pain; oxycodone for severe pain; emphasized to patient to request pain med  - appreciate psych input  - dispo planning in process; LVAD education .

## 2017-10-08 NOTE — PROGRESS NOTE ADULT - ASSESSMENT
Briefly, 61 y/o Creole speaking male who presented with abdominal pain and was found to be in low output heart failure with cardiogenic shock (EF 10%) underwent LVAD/TV annuloplasty for INTERMACS 2 9/12; postop had bleeding requiring PRBC, amicar, PLT, cryo. TTE 9/13 LVEDD 5.9 cm, mild-mod MR, aortic valve closed, mild RV dysfunction, mild TR. Hospital course c/b significant L sided pain; found to have L hemithorax s/p pigtail that was removed; fluid c/w exudative with lot of RBC, culture negative. Continues to have pain in L flank and frustration. Currently looks euvolemic. Labs noted for worsening hyponatremia - renal consulted- urine studies sent.  LVAD interrogated w/o events.

## 2017-10-08 NOTE — PROGRESS NOTE ADULT - SUBJECTIVE AND OBJECTIVE BOX
Chief complaint  Patient is a 62y old  Male who presents with a chief complaint of SOOB (25 Aug 2017 22:27)   Review of systems  Patient in bed, comfortable, no fever, no hypoglycemia.    Labs and Fingersticks    CAPILLARY BLOOD GLUCOSE    Anion Gap, Serum: 6 (10-08 @ 06:08)  Anion Gap, Serum: 10 (10-07 @ 05:07)      Calcium, Total Serum: 9.4 (10-08 @ 06:08)  Calcium, Total Serum: 9.3 (10-07 @ 05:07)          10-08    129<L>  |  94<L>  |  19  ----------------------------<  94  4.7   |  29  |  0.69    Ca    9.4      08 Oct 2017 06:08                          9.9    8.0   )-----------( 264      ( 08 Oct 2017 06:08 )             29.4     Medications  MEDICATIONS  (STANDING):  aspirin enteric coated 81 milliGRAM(s) Oral daily  glycerin Suppository - Adult 1 Suppository(s) Rectal every 4 hours  lidocaine   Patch 1 Patch Transdermal daily  lisinopril 2.5 milliGRAM(s) Oral daily  metoprolol succinate ER 12.5 milliGRAM(s) Oral daily  mirtazapine 7.5 milliGRAM(s) Oral at bedtime  oxyCODONE  ER Tablet 10 milliGRAM(s) Oral every 12 hours  pantoprazole    Tablet 40 milliGRAM(s) Oral before breakfast  polyethylene glycol 3350 17 Gram(s) Oral at bedtime  senna 2 Tablet(s) Oral at bedtime  warfarin 4 milliGRAM(s) Oral once      Physical Exam  General: Patient comfortable in bed  Vital Signs Last 12 Hrs  T(F): 98.3 (10-08-17 @ 20:00), Max: 98.3 (10-08-17 @ 20:00)  HR: 96 (10-08-17 @ 16:00) (95 - 96)  BP: --  BP(mean): 62 (10-08-17 @ 16:00) (62 - 62)  RR: 18 (10-08-17 @ 20:00) (18 - 18)  SpO2: 100% (10-08-17 @ 20:00) (100% - 100%)  Neck: No palpable thyroid nodules.  CVS: S1S2, No murmurs  Respiratory: No wheezing, no crepitations  GI: Abdomen soft, bowel sounds positive  Musculoskeletal: Positive edema lower extremities bilaterally  Skin: No skin rashes, no ecchymosis    Diagnostics    Osmolality, Random Urine: Routine (10-06 @ 10:48)  Osmolality, Serum: Routine (10-06 @ 10:48)  Sodium, Random Urine: Routine (10-06 @ 10:48)

## 2017-10-08 NOTE — PROGRESS NOTE ADULT - SUBJECTIVE AND OBJECTIVE BOX
Interval History:  still c/o pain but improved  ambulated w/o event    Medications:  aspirin enteric coated 81 milliGRAM(s) Oral daily  glycerin Suppository - Adult 1 Suppository(s) Rectal every 4 hours  lidocaine   Patch 1 Patch Transdermal daily  lisinopril 2.5 milliGRAM(s) Oral daily  metoprolol succinate ER 12.5 milliGRAM(s) Oral daily  mirtazapine 7.5 milliGRAM(s) Oral at bedtime  oxyCODONE    IR 5 milliGRAM(s) Oral every 6 hours PRN  oxyCODONE  ER Tablet 10 milliGRAM(s) Oral every 12 hours  pantoprazole    Tablet 40 milliGRAM(s) Oral before breakfast  polyethylene glycol 3350 17 Gram(s) Oral at bedtime  senna 2 Tablet(s) Oral at bedtime  traMADol 25 milliGRAM(s) Oral every 6 hours PRN  warfarin 4 milliGRAM(s) Oral once    Vitals:  T(C): 36.6 (10-08-17 @ 16:00), Max: 36.8 (10-08-17 @ 05:00)  HR: 96 (10-08-17 @ 16:00) (89 - 97)  BP(mean): 62 (10-08-17 @ 16:00) (62 - 74)  RR: 18 (10-08-17 @ 16:00) (16 - 18)  SpO2: 100% (10-08-17 @ 16:00) (100% - 100%)    Daily     Daily     I&O's Summary    07 Oct 2017 07:01  -  08 Oct 2017 07:00  --------------------------------------------------------  IN: 910 mL / OUT: 575 mL / NET: 335 mL    08 Oct 2017 07:01  -  08 Oct 2017 17:28  --------------------------------------------------------  IN: 680 mL / OUT: 300 mL / NET: 380 mL    Physical Exam:  Appearance: No Acute Distress  HEENT: Low JVD  Cardiovascular: Normal S1 S2, No murmurs/rubs/gallops. LVAD hum  Respiratory: Clear to auscultation bilaterally  Gastrointestinal: Soft, slightly TTP across abdomen	  Skin: No cyanosis	  Neurologic: Non-focal  Extremities: No LE edema  Psychiatry: A & O x 3, Mood & affect appropriate    LVAD Interrogation:  Pump Flow: 5  Pump Speed: 9000  Pulse Index: 4.6  Pump Power: 5.2  VAD Events: no events  Driveline evaluation:  c/d/i  Programming Changes: No changes made    Labs:                        9.9    8.0   )-----------( 264      ( 08 Oct 2017 06:08 )             29.4     10-08    129<L>  |  94<L>  |  19  ----------------------------<  94  4.7   |  29  |  0.69    Ca    9.4      08 Oct 2017 06:08      PT/INR - ( 08 Oct 2017 06:08 )   PT: 30.3 sec;   INR: 2.75 ratio        Lactate Dehydrogenase, Serum: 351 U/L (10-08 @ 06:08)  Lactate Dehydrogenase, Serum: 335 U/L (10-07 @ 05:07)

## 2017-10-08 NOTE — PROGRESS NOTE ADULT - SUBJECTIVE AND OBJECTIVE BOX
VITAL SIGNS    Telemetry:  SR 80-90 with bried PAf 140   Vital Signs Last 24 Hrs  T(C): 36.4 (10-08-17 @ 12:00), Max: 36.8 (10-08-17 @ 05:00)  T(F): 97.6 (10-08-17 @ 12:00), Max: 98.2 (10-08-17 @ 05:00)  HR: 95 (10-08-17 @ 12:00) (89 - 97)  BP: --  RR: 18 (10-08-17 @ 12:00) (16 - 18)  SpO2: 100% (10-08-17 @ 12:00) (99% - 100%)            10-07 @ 07:01  -  10-08 @ 07:00  --------------------------------------------------------  IN: 910 mL / OUT: 575 mL / NET: 335 mL    10-08 @ 07:01  -  10-08 @ 13:22  --------------------------------------------------------  IN: 440 mL / OUT: 300 mL / NET: 140 mL       Admit Wt: Drug Dosing Weight  Height (cm): 177.8 (31 Aug 2017 18:00)  Weight (kg): 60 (31 Aug 2017 18:00)  BMI (kg/m2): 19 (31 Aug 2017 18:00)  BSA (m2): 1.75 (31 Aug 2017 18:00)      MEDICATIONS  aspirin enteric coated 81 milliGRAM(s) Oral daily  glycerin Suppository - Adult 1 Suppository(s) Rectal every 4 hours  lidocaine   Patch 1 Patch Transdermal daily  lisinopril 2.5 milliGRAM(s) Oral daily  metoprolol succinate ER 12.5 milliGRAM(s) Oral daily  mirtazapine 7.5 milliGRAM(s) Oral at bedtime  oxyCODONE    IR 5 milliGRAM(s) Oral every 6 hours PRN  oxyCODONE  ER Tablet 10 milliGRAM(s) Oral every 12 hours  pantoprazole    Tablet 40 milliGRAM(s) Oral before breakfast  polyethylene glycol 3350 17 Gram(s) Oral at bedtime  senna 2 Tablet(s) Oral at bedtime  traMADol 25 milliGRAM(s) Oral every 6 hours PRN  warfarin 4 milliGRAM(s) Oral once      PHYSICAL EXAM    Subjective: no complaints   Neurology: alert and oriented x 3, nonfocal, no gross deficits  CV : + lvad humm no JVD   Sternal Wound :  CDI , Stable healed drive site CDI   Lungs: CTA, equal chest expansion  Abdomen: soft, nontender, nondistended, positive bowel sounds, last bowel movement  10/8/17   :    voids  Extremities: no   edema,  +dp b/l , no calt tenderness b/l , warm and perfused b/l    LABS  10-08    129<L>  |  94<L>  |  19  ----------------------------<  94  4.7   |  29  |  0.69    Ca    9.4      08 Oct 2017 06:08                                   9.9    8.0   )-----------( 264      ( 08 Oct 2017 06:08 )             29.4          PT/INR - ( 08 Oct 2017 06:08 )   PT: 30.3 sec;   INR: 2.75 ratio                  PAST MEDICAL & SURGICAL HISTORY:  No pertinent past medical history  No significant past surgical history       Physical Therapy Rec:   Home  [  ]   Home w/ PT  [  ]  Rehab  [ x]

## 2017-10-09 DIAGNOSIS — I50.20 UNSPECIFIED SYSTOLIC (CONGESTIVE) HEART FAILURE: ICD-10-CM

## 2017-10-09 DIAGNOSIS — F32.9 MAJOR DEPRESSIVE DISORDER, SINGLE EPISODE, UNSPECIFIED: ICD-10-CM

## 2017-10-09 LAB
ANION GAP SERPL CALC-SCNC: 11 MMOL/L — SIGNIFICANT CHANGE UP (ref 5–17)
BUN SERPL-MCNC: 19 MG/DL — SIGNIFICANT CHANGE UP (ref 7–23)
CALCIUM SERPL-MCNC: 9.1 MG/DL — SIGNIFICANT CHANGE UP (ref 8.4–10.5)
CHLORIDE SERPL-SCNC: 93 MMOL/L — LOW (ref 96–108)
CO2 SERPL-SCNC: 28 MMOL/L — SIGNIFICANT CHANGE UP (ref 22–31)
CREAT SERPL-MCNC: 0.68 MG/DL — SIGNIFICANT CHANGE UP (ref 0.5–1.3)
GLUCOSE SERPL-MCNC: 144 MG/DL — HIGH (ref 70–99)
HCT VFR BLD CALC: 29.6 % — LOW (ref 39–50)
HGB BLD-MCNC: 10.1 G/DL — LOW (ref 13–17)
INR BLD: 2.45 RATIO — HIGH (ref 0.88–1.16)
MCHC RBC-ENTMCNC: 32.7 PG — SIGNIFICANT CHANGE UP (ref 27–34)
MCHC RBC-ENTMCNC: 34.2 GM/DL — SIGNIFICANT CHANGE UP (ref 32–36)
MCV RBC AUTO: 95.7 FL — SIGNIFICANT CHANGE UP (ref 80–100)
PLATELET # BLD AUTO: 276 K/UL — SIGNIFICANT CHANGE UP (ref 150–400)
POTASSIUM SERPL-MCNC: 4.7 MMOL/L — SIGNIFICANT CHANGE UP (ref 3.5–5.3)
POTASSIUM SERPL-SCNC: 4.7 MMOL/L — SIGNIFICANT CHANGE UP (ref 3.5–5.3)
PROTHROM AB SERPL-ACNC: 27.2 SEC — HIGH (ref 9.8–12.7)
RBC # BLD: 3.09 M/UL — LOW (ref 4.2–5.8)
RBC # FLD: 13 % — SIGNIFICANT CHANGE UP (ref 10.3–14.5)
SODIUM SERPL-SCNC: 132 MMOL/L — LOW (ref 135–145)
WBC # BLD: 7.7 K/UL — SIGNIFICANT CHANGE UP (ref 3.8–10.5)
WBC # FLD AUTO: 7.7 K/UL — SIGNIFICANT CHANGE UP (ref 3.8–10.5)

## 2017-10-09 PROCEDURE — 99233 SBSQ HOSP IP/OBS HIGH 50: CPT | Mod: GC

## 2017-10-09 PROCEDURE — 93750 INTERROGATION VAD IN PERSON: CPT

## 2017-10-09 PROCEDURE — 99233 SBSQ HOSP IP/OBS HIGH 50: CPT | Mod: 25

## 2017-10-09 PROCEDURE — 99233 SBSQ HOSP IP/OBS HIGH 50: CPT

## 2017-10-09 RX ORDER — PHENYLEPHRINE-SHARK LIVER OIL-MINERAL OIL-PETROLATUM RECTAL OINTMENT
1 OINTMENT (GRAM) RECTAL EVERY 8 HOURS
Qty: 0 | Refills: 0 | Status: DISCONTINUED | OUTPATIENT
Start: 2017-10-09 | End: 2017-10-22

## 2017-10-09 RX ORDER — WARFARIN SODIUM 2.5 MG/1
4 TABLET ORAL ONCE
Qty: 0 | Refills: 0 | Status: COMPLETED | OUTPATIENT
Start: 2017-10-09 | End: 2017-10-09

## 2017-10-09 RX ORDER — DOCUSATE SODIUM 100 MG
100 CAPSULE ORAL THREE TIMES A DAY
Qty: 0 | Refills: 0 | Status: DISCONTINUED | OUTPATIENT
Start: 2017-10-09 | End: 2017-10-22

## 2017-10-09 RX ORDER — SORBITOL SOLUTION 70 %
30 SOLUTION, ORAL MISCELLANEOUS ONCE
Qty: 0 | Refills: 0 | Status: COMPLETED | OUTPATIENT
Start: 2017-10-09 | End: 2017-10-09

## 2017-10-09 RX ADMIN — Medication 12.5 MILLIGRAM(S): at 06:05

## 2017-10-09 RX ADMIN — OXYCODONE HYDROCHLORIDE 10 MILLIGRAM(S): 5 TABLET ORAL at 17:02

## 2017-10-09 RX ADMIN — OXYCODONE HYDROCHLORIDE 5 MILLIGRAM(S): 5 TABLET ORAL at 09:34

## 2017-10-09 RX ADMIN — OXYCODONE HYDROCHLORIDE 10 MILLIGRAM(S): 5 TABLET ORAL at 06:05

## 2017-10-09 RX ADMIN — WARFARIN SODIUM 4 MILLIGRAM(S): 2.5 TABLET ORAL at 20:45

## 2017-10-09 RX ADMIN — OXYCODONE HYDROCHLORIDE 10 MILLIGRAM(S): 5 TABLET ORAL at 17:32

## 2017-10-09 RX ADMIN — MIRTAZAPINE 7.5 MILLIGRAM(S): 45 TABLET, ORALLY DISINTEGRATING ORAL at 20:45

## 2017-10-09 RX ADMIN — Medication 81 MILLIGRAM(S): at 12:04

## 2017-10-09 RX ADMIN — OXYCODONE HYDROCHLORIDE 5 MILLIGRAM(S): 5 TABLET ORAL at 09:04

## 2017-10-09 RX ADMIN — Medication 100 MILLIGRAM(S): at 14:16

## 2017-10-09 RX ADMIN — LISINOPRIL 2.5 MILLIGRAM(S): 2.5 TABLET ORAL at 06:05

## 2017-10-09 RX ADMIN — Medication 1 SUPPOSITORY(S): at 06:07

## 2017-10-09 RX ADMIN — OXYCODONE HYDROCHLORIDE 10 MILLIGRAM(S): 5 TABLET ORAL at 06:35

## 2017-10-09 RX ADMIN — Medication 1 SUPPOSITORY(S): at 09:02

## 2017-10-09 RX ADMIN — PANTOPRAZOLE SODIUM 40 MILLIGRAM(S): 20 TABLET, DELAYED RELEASE ORAL at 06:04

## 2017-10-09 NOTE — PROGRESS NOTE ADULT - PROBLEM SELECTOR PLAN 3
Continue aspirin 81 mg daily  Daily Coumadin dosing and PT/INR- - Continue aspirin 81 mg daily  - Daily Coumadin dosing and PT/INR-

## 2017-10-09 NOTE — PROGRESS NOTE ADULT - ASSESSMENT
63 y/o Creole speaking male who presented with abdominal pain and was found to be in low output heart failure with cardiogenic shock (EF 10%) s/p 9/12 LVAD/TV annuloplasty for INTERMACS 2   Postop Course:   Acute blood loss anemia 2/2 blood loss postop -->requiring PRBC, amicar, PLT, cryo.  TTE 9/13 LVEDD 5.9 cm, mild-mod MR, aortic valve closed, mild RV dysfunction, mild TR.   c/o L sided pain; found to have L hemithorax s/p pigtail that was removed; fluid c/w exudative with lot of RBC,   (+) Hyponatremia - renal consulted- urine studies sent / Endocrine following for SIADH work up   LVAD interrogated w/o events. 63 y/o Creole speaking male who presented with abdominal pain and was found to be in low output heart failure with cardiogenic shock (EF 10%) s/p 9/12 LVAD/TV annuloplasty for INTERMACS 2   Postop Course:   Acute blood loss anemia 2/2 blood loss postop -->requiring PRBC, amicar, PLT, cryo.  TTE 9/13 LVEDD 5.9 cm, mild-mod MR, aortic valve closed, mild RV dysfunction, mild TR.   c/o L sided pain; found to have L hemithorax s/p pigtail that was removed; fluid c/w exudative with lot of RBC,   (+) Hyponatremia - renal consulted- urine studies sent / Endocrine following for SIADH work up  10/6 Random Urine: 49; Patient euthyroid and not adrenally insufficient -->Patient dx with SIADH, on fluid restriction,   LVAD interrogated w/o events. 61 y/o Creole speaking male who presented with abdominal pain and was found to be in low output heart failure with cardiogenic shock (EF 10%) s/p 9/12 LVAD/TV annuloplasty for INTERMACS 2   Postop Course:   Acute blood loss anemia 2/2 blood loss postop -->requiring PRBC, amicar, PLT, cryo.  TTE 9/13 LVEDD 5.9 cm, mild-mod MR, aortic valve closed, mild RV dysfunction, mild TR.   c/o L sided pain; found to have L hemithorax s/p pigtail that was removed; fluid c/w exudative with lot of RBC,   (+) Hyponatremia - renal consulted- urine studies sent / Endocrine following for SIADH work up  10/6 Random Urine: 49; Patient euthyroid and not adrenally insufficient -->Patient dx with SIADH, on fluid restriction.  (+) depressive mood --> psych following; Remeron initiated; started at 7.5 mg HS; avoid up titrating 2/2 risk of worsening hyponatremia    LVAD interrogated w/o events.   10/9 Small amount of rectal bleeding after bowel movement; (+) internal hemorrhoids --> Continue bowel regimen / Start Preparation H suppository TID PRN.

## 2017-10-09 NOTE — PROGRESS NOTE ADULT - PROBLEM SELECTOR PLAN 4
Ongoing nutritional support. Calorie count. Ensure supplements TID. - Ongoing nutritional support.  - Calorie count  - Ensure supplements TID.

## 2017-10-09 NOTE — PROGRESS NOTE ADULT - SUBJECTIVE AND OBJECTIVE BOX
Behavioral Cardiology Progress Note     HPI:  Mr. Quispe is a 62 year old man with a nonischemic cardiomyopathy with ACC/AHA stage D congestive heart failure who underwent LVAD/TV annuloplasty for INTERMACS 2 on 9/12/17.  His postoperative course was complicated by bleeding requiring transfusions, which has stabilized.      Behavioral Health Assessment:     Mood:  "I'm tired."           Current stressors:   Adjustment to LVAD   Hospitalization    Pain management     Support system/family support:   Family support (family members -sister and her family) are currently in [facility] until 10/22/17.  Pt’s niece is an RN at [employer] [facility] [employer].       Coping strategies:    Limited; reports he tends to keep things to himself, reluctant to ask others for help.        Understanding of medical illness and treatment plan:   Continues with process of LVAD education with LVAD coordinator.        MSE:   Seen resting in bed.  A&Ox3.  Fairly related with intermittent eye contact, kept his eyes closed for periods during assessment. Thought process goal directed.  No abnormal thought content, denies s/i.  Mood: dysphoric.  Affect anxious.  Insight and judgment adequate.

## 2017-10-09 NOTE — PROGRESS NOTE ADULT - ASSESSMENT
Briefly, 63 y/o Creole speaking male who presented with abdominal pain and was found to be in low output heart failure with cardiogenic shock (EF 10%) underwent LVAD/TV annuloplasty for INTERMACS 2 9/12; palliative care called for pain control/ psychosocial support.

## 2017-10-09 NOTE — PROGRESS NOTE ADULT - SUBJECTIVE AND OBJECTIVE BOX
Subjective:    Medications:  aspirin enteric coated 81 milliGRAM(s) Oral daily  glycerin Suppository - Adult 1 Suppository(s) Rectal every 4 hours  lidocaine   Patch 1 Patch Transdermal daily  lisinopril 2.5 milliGRAM(s) Oral daily  metoprolol succinate ER 12.5 milliGRAM(s) Oral daily  mirtazapine 7.5 milliGRAM(s) Oral at bedtime  oxyCODONE    IR 5 milliGRAM(s) Oral every 6 hours PRN  oxyCODONE  ER Tablet 10 milliGRAM(s) Oral every 12 hours  pantoprazole    Tablet 40 milliGRAM(s) Oral before breakfast  polyethylene glycol 3350 17 Gram(s) Oral at bedtime  senna 2 Tablet(s) Oral at bedtime  traMADol 25 milliGRAM(s) Oral every 6 hours PRN          Vitals:  T(C): 36.7 (10-09-17 @ 04:40), Max: 36.8 (10-08-17 @ 20:00)  HR: 95 (10-09-17 @ 04:40) (89 - 96)  BP(mean): 74 (10-09-17 @ 04:40) (62 - 74)    RR: 16 (10-09-17 @ 04:40) (16 - 18)  SpO2: 100% (10-09-17 @ 04:40) (100% - 100%)  Vital Signs Last 24 Hrs  T(C): 36.7 (09 Oct 2017 04:40), Max: 36.8 (08 Oct 2017 20:00)  T(F): 98 (09 Oct 2017 04:40), Max: 98.3 (08 Oct 2017 20:00)  HR: 95 (09 Oct 2017 04:40) (89 - 96)  BP(mean): 74 (09 Oct 2017 04:40) (62 - 74)  RR: 16 (09 Oct 2017 04:40) (16 - 18)  SpO2: 100% (09 Oct 2017 04:40) (100% - 100%)    I&O's Summary    08 Oct 2017 07:01  -  09 Oct 2017 07:00  --------------------------------------------------------  IN: 920 mL / OUT: 650 mL / NET: 270 mL        General: No distress. Comfortable.  HEENT: EOM intact.  Neck: Neck supple. JVP not elevated. No masses  Chest: Clear to auscultation bilaterally  CV: Normal S1 and S2.NormalLVAD sounds  Abdomen: Soft, non-distended, non-tender  Skin: No rashes or skin breakdown  Neurology: Alert and oriented times three. Sensation intact  Psych: Affect normal  LVAD Interrogation:  Pump Flow:  Pump Speed:  Pulse Index:  Pump Power:  VAD Events:   Driveline evaluation:    Programming Changes: [ ] No changes madeLabs:                        10.1   7.7   )-----------( 276      ( 09 Oct 2017 05:18 )             29.6     10-09    132<L>  |  93<L>  |  19  ----------------------------<  144<H>  4.7   |  28  |  0.68    Ca    9.1      09 Oct 2017 05:18      PT/INR - ( 09 Oct 2017 05:18 )   PT: 27.2 sec;   INR: 2.45 ratio           Lactate Dehydrogenase, Serum: 351 U/L (10-08 @ 06:08)  Lactate Dehydrogenase, Serum: 335 U/L (10-07 @ 05:07) Subjective:    Medications:  aspirin enteric coated 81 milliGRAM(s) Oral daily  glycerin Suppository - Adult 1 Suppository(s) Rectal every 4 hours  lidocaine   Patch 1 Patch Transdermal daily  lisinopril 2.5 milliGRAM(s) Oral daily  metoprolol succinate ER 12.5 milliGRAM(s) Oral daily  mirtazapine 7.5 milliGRAM(s) Oral at bedtime  oxyCODONE    IR 5 milliGRAM(s) Oral every 6 hours PRN  oxyCODONE  ER Tablet 10 milliGRAM(s) Oral every 12 hours  pantoprazole    Tablet 40 milliGRAM(s) Oral before breakfast  polyethylene glycol 3350 17 Gram(s) Oral at bedtime  senna 2 Tablet(s) Oral at bedtime  traMADol 25 milliGRAM(s) Oral every 6 hours PRN          Vitals:  T(C): 36.7 (10-09-17 @ 04:40), Max: 36.8 (10-08-17 @ 20:00)  HR: 95 (10-09-17 @ 04:40) (89 - 96)  BP(mean): 74 (10-09-17 @ 04:40) (62 - 74)    RR: 16 (10-09-17 @ 04:40) (16 - 18)  SpO2: 100% (10-09-17 @ 04:40) (100% - 100%)  Vital Signs Last 24 Hrs  T(C): 36.7 (09 Oct 2017 04:40), Max: 36.8 (08 Oct 2017 20:00)  T(F): 98 (09 Oct 2017 04:40), Max: 98.3 (08 Oct 2017 20:00)  HR: 95 (09 Oct 2017 04:40) (89 - 96)  BP(mean): 74 (09 Oct 2017 04:40) (62 - 74)  RR: 16 (09 Oct 2017 04:40) (16 - 18)  SpO2: 100% (09 Oct 2017 04:40) (100% - 100%)    I&O's Summary    08 Oct 2017 07:01  -  09 Oct 2017 07:00  --------------------------------------------------------  IN: 920 mL / OUT: 650 mL / NET: 270 mL        General: No distress. Comfortable.  HEENT: EOM intact.  Neck: Neck supple. JVP not elevated. No masses  Chest: Clear to auscultation bilaterally  CV: Normal S1 and S2.NormalLVAD sounds  Abdomen: Soft, non-distended, non-tender  Skin: No rashes or skin breakdown  Neurology: Alert and oriented times three. Sensation intact  Psych: Affect normal  LVAD Interrogation:  Pump Flow:5.0  Pump Speed:9000  Pulse Index:5.3  Pump Power:5.1  VAD Events:   Driveline evaluation:    Programming Changes: [ ] No changes made                          10.1   7.7   )-----------( 276      ( 09 Oct 2017 05:18 )             29.6     10-09    132<L>  |  93<L>  |  19  ----------------------------<  144<H>  4.7   |  28  |  0.68    Ca    9.1      09 Oct 2017 05:18      PT/INR - ( 09 Oct 2017 05:18 )   PT: 27.2 sec;   INR: 2.45 ratio           Lactate Dehydrogenase, Serum: 351 U/L (10-08 @ 06:08)  Lactate Dehydrogenase, Serum: 335 U/L (10-07 @ 05:07) Subjective: Pt sleepy now   Am was OOb ambulating to BR     Medications:  aspirin enteric coated 81 milliGRAM(s) Oral daily  glycerin Suppository - Adult 1 Suppository(s) Rectal every 4 hours  lidocaine   Patch 1 Patch Transdermal daily  lisinopril 2.5 milliGRAM(s) Oral daily  metoprolol succinate ER 12.5 milliGRAM(s) Oral daily  mirtazapine 7.5 milliGRAM(s) Oral at bedtime  oxyCODONE    IR 5 milliGRAM(s) Oral every 6 hours PRN  oxyCODONE  ER Tablet 10 milliGRAM(s) Oral every 12 hours  pantoprazole    Tablet 40 milliGRAM(s) Oral before breakfast  polyethylene glycol 3350 17 Gram(s) Oral at bedtime  senna 2 Tablet(s) Oral at bedtime  traMADol 25 milliGRAM(s) Oral every 6 hours PRN    Vitals:  T(C): 36.7 (10-09-17 @ 04:40), Max: 36.8 (10-08-17 @ 20:00)  HR: 95 (10-09-17 @ 04:40) (89 - 96)  BP(mean): 74 (10-09-17 @ 04:40) (62 - 74)  RR: 16 (10-09-17 @ 04:40) (16 - 18)  SpO2: 100% (10-09-17 @ 04:40) (100% - 100%)  I&O's Summary    08 Oct 2017 07:01  -  09 Oct 2017 07:00  --------------------------------------------------------  IN: 920 mL / OUT: 650 mL / NET: 270 mL        General: No distress. Comfortable.  HEENT: EOM intact.  Neck: Neck supple. JVP not elevated. No masses  Chest: Clear to auscultation bilaterally  CV: Normal S1 and S2.NormalLVAD sounds  Abdomen: Soft, non-distended, non-tender  Skin: No rashes or skin breakdown  Neurology: Alert and oriented times three. Sensation intact  Psych: Affect normal  LVAD Interrogation:  Pump Flow:5.0  Pump Speed:9000  Pulse Index:5.3  Pump Power:5.1  Driveline evaluation:  clean and dry   Programming Changes: [ ] No changes made                          10.1   7.7   )-----------( 276      ( 09 Oct 2017 05:18 )             29.6     10-09    132<L>  |  93<L>  |  19  ----------------------------<  144<H>  4.7   |  28  |  0.68    Ca    9.1      09 Oct 2017 05:18      PT/INR - ( 09 Oct 2017 05:18 )   PT: 27.2 sec;   INR: 2.45 ratio           Lactate Dehydrogenase, Serum: 351 U/L (10-08 @ 06:08)  Lactate Dehydrogenase, Serum: 335 U/L (10-07 @ 05:07) Subjective: Pt sleepy now   Am was OOb ambulating to BR     Medications:  aspirin enteric coated 81 milliGRAM(s) Oral daily  glycerin Suppository - Adult 1 Suppository(s) Rectal every 4 hours  lidocaine   Patch 1 Patch Transdermal daily  lisinopril 2.5 milliGRAM(s) Oral daily  metoprolol succinate ER 12.5 milliGRAM(s) Oral daily  mirtazapine 7.5 milliGRAM(s) Oral at bedtime  oxyCODONE    IR 5 milliGRAM(s) Oral every 6 hours PRN  oxyCODONE  ER Tablet 10 milliGRAM(s) Oral every 12 hours  pantoprazole    Tablet 40 milliGRAM(s) Oral before breakfast  polyethylene glycol 3350 17 Gram(s) Oral at bedtime  senna 2 Tablet(s) Oral at bedtime  traMADol 25 milliGRAM(s) Oral every 6 hours PRN    Vitals:  T(C): 36.7 (10-09-17 @ 04:40), Max: 36.8 (10-08-17 @ 20:00)  HR: 95 (10-09-17 @ 04:40) (89 - 96)  BP(mean): 74 (10-09-17 @ 04:40) (62 - 74)  RR: 16 (10-09-17 @ 04:40) (16 - 18)  SpO2: 100% (10-09-17 @ 04:40) (100% - 100%)  I&O's Summary    08 Oct 2017 07:01  -  09 Oct 2017 07:00  --------------------------------------------------------  IN: 920 mL / OUT: 650 mL / NET: 270 mL        General: No distress. Comfortable.  HEENT: EOM intact.  Neck: Neck supple. JVP not elevated. No masses  Chest: Clear to auscultation bilaterally  CV: Normal S1 and S2.NormalLVAD sounds  Abdomen: Soft, non-distended, non-tender  Skin: No rashes or skin breakdown  Neurology: Alert and oriented times three. Sensation intact  Psych: Affect normal    LVAD Interrogation:  Pump Flow:5.0  Pump Speed:9000  Pulse Index:5.3  Pump Power:5.1  Driveline evaluation:  clean and dry   Programming Changes: No changes made                          10.1   7.7   )-----------( 276      ( 09 Oct 2017 05:18 )             29.6     10-09    132<L>  |  93<L>  |  19  ----------------------------<  144<H>  4.7   |  28  |  0.68    Ca    9.1      09 Oct 2017 05:18      PT/INR - ( 09 Oct 2017 05:18 )   PT: 27.2 sec;   INR: 2.45 ratio           Lactate Dehydrogenase, Serum: 351 U/L (10-08 @ 06:08)  Lactate Dehydrogenase, Serum: 335 U/L (10-07 @ 05:07)

## 2017-10-09 NOTE — PROGRESS NOTE ADULT - PROBLEM SELECTOR PLAN 5
PRN pain medication in addition to lidocaine patch and standing oxycontin.  Tramadol added PRN pain medication in addition to lidocaine patch and standing Oxycontin.  Tramadol added - PRN pain medication in addition to lidocaine patch and standing Oxycontin.  - Tramadol added

## 2017-10-09 NOTE — PROGRESS NOTE ADULT - SUBJECTIVE AND OBJECTIVE BOX
INTERVAL HPI/OVERNIGHT EVENTS: no new gi events  intermittent abdominal pain  no bm    HPI:  62M with no PMHx here with chest pain. Began last week, described as pleuritic, worsens with cough and deep inspiration. Has been having SOB as well, dry cough. Recently vacationed in [facility] came back today. Saw doctor in [facility] but unclear what workup was. No fever, sick contacts, abd pain. CP described as midsternal, nonradiating. Has 25 pack year smoking history.  CTPA does not reveal PE, Pulm congestion noted.   No new overnight event.  No N/V/D.  Tolerating diet.    MEDICATIONS  (STANDING):  aspirin enteric coated 81 milliGRAM(s) Oral daily  docusate sodium 100 milliGRAM(s) Oral three times a day  polyethylene glycol 3350 17 Gram(s) Oral daily  pantoprazole    Tablet 40 milliGRAM(s) Oral before breakfast  lactobacillus acidophilus and bulgaricus Chewable 1 Tablet(s) Chew three times a day  sorbitol 70% Solution 30 milliLiter(s) Oral once  senna 2 Tablet(s) Oral at bedtime  oxyCODONE  ER Tablet 10 milliGRAM(s) Oral every 12 hours  lisinopril 5 milliGRAM(s) Oral daily  acetaminophen   Tablet. 975 milliGRAM(s) Oral every 8 hours  lidocaine   Patch 1 Patch Transdermal daily  warfarin 6 milliGRAM(s) Oral once    MEDICATIONS  (PRN):  bisacodyl Suppository 10 milliGRAM(s) Rectal daily PRN Constipation      Allergies    No Known Allergies    Intolerances          General:  No wt loss, fevers, chills, night sweats, fatigue,   Eyes:  Good vision, no reported pain  ENT:  No sore throat, pain, runny nose, dysphagia  CV:  No pain, palpitations, hypo/hypertension  Resp:  No dyspnea, cough, tachypnea, wheezing  GI:  No pain, No nausea, No vomiting, No diarrhea, No constipation, No weight loss, No fever, No pruritis, No rectal bleeding, No tarry stools, No dysphagia,  :  No pain, bleeding, incontinence, nocturia  Muscle:  No pain, weakness  Neuro:  No weakness, tingling, memory problems  Psych:  No fatigue, insomnia, mood problems, depression  Endocrine:  No polyuria, polydipsia, cold/heat intolerance  Heme:  No petechiae, ecchymosis, easy bruisability  Skin:  No rash, tattoos, scars, edema      PHYSICAL EXAM:   Vital Signs:  Vital Signs Last 24 Hrs  T(C): 36.7 (01 Oct 2017 13:00), Max: 36.8 (30 Sep 2017 18:00)  T(F): 98.1 (01 Oct 2017 13:00), Max: 98.3 (30 Sep 2017 18:58)  HR: 82 (01 Oct 2017 13:00) (73 - 91)  BP: --  BP(mean): 76 (01 Oct 2017 13:00) (72 - 84)  RR: 16 (01 Oct 2017 13:00) (16 - 18)  SpO2: 99% (01 Oct 2017 13:00) (98% - 100%)  Daily     Daily I&O's Summary    30 Sep 2017 07:01  -  01 Oct 2017 07:00  --------------------------------------------------------  IN: 1524 mL / OUT: 2450 mL / NET: -926 mL    01 Oct 2017 07:01  -  01 Oct 2017 15:36  --------------------------------------------------------  IN: 480 mL / OUT: 500 mL / NET: -20 mL        GENERAL:  Appears stated age, well-groomed, well-nourished, no distress  HEENT:  NC/AT,  conjunctivae clear and pink, no thyromegaly, nodules, adenopathy, no JVD, sclera -anicteric  CHEST:  Full & symmetric excursion, no increased effort, breath sounds clear  HEART:  Regular rhythm, S1, S2, no murmur/rub/S3/S4, no abdominal bruit, no edema  ABDOMEN:  Soft, non-tender, non-distended, normoactive bowel sounds,  no masses ,no hepato-splenomegaly, no signs of chronic liver disease  EXTEREMITIES:  no cyanosis,clubbing or edema  SKIN:  No rash/erythema/ecchymoses/petechiae/wounds/abscess/warm/dry  NEURO:  Alert, oriented, no asterixis, no tremor, no encephalopathy      LABS:                        8.9    11.0  )-----------( 389      ( 01 Oct 2017 01:13 )             26.6     10-01    128<L>  |  91<L>  |  15  ----------------------------<  178<H>  5.0   |  26  |  0.62    Ca    9.3      01 Oct 2017 01:13      PT/INR - ( 01 Oct 2017 01:13 )   PT: 22.7 sec;   INR: 2.05 ratio         PTT - ( 01 Oct 2017 01:13 )  PTT:103.0 sec    amylase   lipase  RADIOLOGY & ADDITIONAL TESTS:

## 2017-10-09 NOTE — PROGRESS NOTE ADULT - SUBJECTIVE AND OBJECTIVE BOX
Patient is a 62y old  Male who presents with a chief complaint of SOOB (25 Aug 2017 22:27)  c/o constipation  no chest pain  no SOB     Any change in ROS:     MEDICATIONS  (STANDING):  aspirin enteric coated 81 milliGRAM(s) Oral daily  docusate sodium 100 milliGRAM(s) Oral three times a day  glycerin Suppository - Adult 1 Suppository(s) Rectal every 4 hours  lidocaine   Patch 1 Patch Transdermal daily  lisinopril 2.5 milliGRAM(s) Oral daily  metoprolol succinate ER 12.5 milliGRAM(s) Oral daily  mirtazapine 7.5 milliGRAM(s) Oral at bedtime  oxyCODONE  ER Tablet 10 milliGRAM(s) Oral every 12 hours  pantoprazole    Tablet 40 milliGRAM(s) Oral before breakfast  polyethylene glycol 3350 17 Gram(s) Oral at bedtime  senna 2 Tablet(s) Oral at bedtime    MEDICATIONS  (PRN):  oxyCODONE    IR 5 milliGRAM(s) Oral every 6 hours PRN Severe Pain (7 - 10)  phenylephrine 0.25% Suppository 1 Suppository(s) Rectal every 8 hours PRN Hemorrhoids  traMADol 25 milliGRAM(s) Oral every 6 hours PRN Moderate Pain (4 - 6)    Vital Signs Last 24 Hrs  T(C): 36.4 (09 Oct 2017 08:44), Max: 36.8 (08 Oct 2017 20:00)  T(F): 97.5 (09 Oct 2017 08:44), Max: 98.3 (08 Oct 2017 20:00)  HR: 97 (09 Oct 2017 08:44) (90 - 97)  BP: --  BP(mean): 72 (09 Oct 2017 08:44) (62 - 74)  RR: 16 (09 Oct 2017 08:44) (16 - 18)  SpO2: 100% (09 Oct 2017 08:44) (100% - 100%)    I&O's Summary    08 Oct 2017 07:01  -  09 Oct 2017 07:00  --------------------------------------------------------  IN: 920 mL / OUT: 650 mL / NET: 270 mL          Physical Exam:   GENERAL: cachectic  HEENT: ALONSO/   Atraumatic, Normocephalic  ENMT: No tonsillar erythema, exudates, or enlargement; Moist mucous membranes, Good dentition, No lesions  NECK: Supple, No JVD, Normal thyroid  CHEST/LUNG: Clear to auscultaion  CVS: Regular rate and rhythm; No murmurs, rubs, or gallops  GI: : Soft, Nontender, Nondistended; Bowel sounds present  NERVOUS SYSTEM:  Alert & Oriented X3  EXTREMITIES:  2+ Peripheral Pulses, No clubbing, cyanosis, or edema  LYMPH: No lymphadenopathy noted  SKIN: No rashes or lesions  ENDOCRINOLOGY: No Thyromegaly  PSYCH: Appropriate    Labs:                              10.1   7.7   )-----------( 276      ( 09 Oct 2017 05:18 )             29.6                         9.9    8.0   )-----------( 264      ( 08 Oct 2017 06:08 )             29.4                         9.9    8.0   )-----------( 282      ( 07 Oct 2017 05:07 )             28.8                         10.0   9.2   )-----------( 321      ( 06 Oct 2017 06:16 )             30.3     10-09    132<L>  |  93<L>  |  19  ----------------------------<  144<H>  4.7   |  28  |  0.68  10-08    129<L>  |  94<L>  |  19  ----------------------------<  94  4.7   |  29  |  0.69  10-07    130<L>  |  94<L>  |  23  ----------------------------<  111<H>  4.4   |  26  |  0.61  10-06    129<L>  |  92<L>  |  19  ----------------------------<  92  4.6   |  26  |  0.68    Ca    9.1      09 Oct 2017 05:18  Ca    9.4      08 Oct 2017 06:08      CAPILLARY BLOOD GLUCOSE            PT/INR - ( 09 Oct 2017 05:18 )   PT: 27.2 sec;   INR: 2.45 ratio             Cultures:           < from: Xray Chest 1 View AP- PORTABLE-Urgent (09.29.17 @ 16:37) >  EXAM:  CHEST-PORTABLE URGENT                            PROCEDURE DATE:  09/29/2017            INTERPRETATION:  AP chest with comparison to 1347 hours from the same day.    IMPRESSION: Left pigtail catheter removed.    IMPRESSION: Left-sided pigtail catheter has been removed. There is no   pneumothorax. The heart is normal in size. The patient is status post   median sternotomy. The lungs are clear.                    TIA BENAVIDES M.D., ATTENDING RADIOLOGIST  This document has been electronically signed. Sep 30 2017  4:18PM        < end of copied text >                    Studies  Chest X-RAY  CT SCAN Chest   Venous Dopplers: LE:   CT Abdomen  Others

## 2017-10-09 NOTE — PROGRESS NOTE ADULT - ATTENDING COMMENTS
SIADH improved with FWR and increasing protein intake  No changes at this point  Reconsult as needed.    Vicente Orozco MD  Cell   Pager   Office

## 2017-10-09 NOTE — PROGRESS NOTE ADULT - ASSESSMENT
More engaged in conversation today.  Reports feeling slightly better.  Has developed rapport with several staff members who report he talks with them and is receptive to encouragement about meals and ambulating.   At other times however, refuses to ambulate and appears withdrawn.  Appetite poor.   Does not like hospital food.   Staff report he ate food family brought in food from home but has infrequent visitors.   Endorsing low mood in setting of limited support from others, “no one comes to see me.”  Taking Remeron 7.5mg at bedtime.   Open to talking about his feelings and worries.   Receptive to support and validation.         Recommendations:  Will benefit from additional coping strategies to manage current stressors   Behavioral Cardiology will continue to follow

## 2017-10-09 NOTE — PROGRESS NOTE ADULT - SUBJECTIVE AND OBJECTIVE BOX
Chief complaint  Patient is a 62y old  Male who presents with a chief complaint of SOOB (25 Aug 2017 22:27)   Review of systems  Patient in bed, comfortable, no fever, no hypoglycemia.    Labs and Fingersticks    CAPILLARY BLOOD GLUCOSE    Anion Gap, Serum: 11 (10-09 @ 05:18)  Anion Gap, Serum: 6 (10-08 @ 06:08)      Calcium, Total Serum: 9.1 (10-09 @ 05:18)  Calcium, Total Serum: 9.4 (10-08 @ 06:08)          10-09    132<L>  |  93<L>  |  19  ----------------------------<  144<H>  4.7   |  28  |  0.68    Ca    9.1      09 Oct 2017 05:18                          10.1   7.7   )-----------( 276      ( 09 Oct 2017 05:18 )             29.6     Medications  MEDICATIONS  (STANDING):  aspirin enteric coated 81 milliGRAM(s) Oral daily  docusate sodium 100 milliGRAM(s) Oral three times a day  glycerin Suppository - Adult 1 Suppository(s) Rectal every 4 hours  lidocaine   Patch 1 Patch Transdermal daily  lisinopril 2.5 milliGRAM(s) Oral daily  metoprolol succinate ER 12.5 milliGRAM(s) Oral daily  mirtazapine 7.5 milliGRAM(s) Oral at bedtime  oxyCODONE  ER Tablet 10 milliGRAM(s) Oral every 12 hours  pantoprazole    Tablet 40 milliGRAM(s) Oral before breakfast  polyethylene glycol 3350 17 Gram(s) Oral at bedtime  senna 2 Tablet(s) Oral at bedtime  warfarin 4 milliGRAM(s) Oral once      Physical Exam  General: Patient comfortable in bed  Vital Signs Last 12 Hrs  T(F): 97.4 (10-09-17 @ 12:34), Max: 98 (10-09-17 @ 04:40)  HR: 94 (10-09-17 @ 12:34) (94 - 97)  BP: --  BP(mean): 70 (10-09-17 @ 12:34) (70 - 74)  RR: 16 (10-09-17 @ 12:34) (16 - 16)  SpO2: 100% (10-09-17 @ 12:34) (100% - 100%)  Neck: No palpable thyroid nodules.  CVS: S1S2, No murmurs  Respiratory: No wheezing, no crepitations  GI: Abdomen soft, bowel sounds positive  Musculoskeletal: Positive edema lower extremities bilaterally  Skin: No skin rashes, no ecchymosis    Diagnostics    Osmolality, Random Urine: Routine  Cancel Reason: Lab Operations Cancel (10-06 @ 10:48)  Osmolality, Serum: Routine (10-06 @ 10:48)  Sodium, Random Urine: Routine (10-06 @ 10:48)

## 2017-10-09 NOTE — PROGRESS NOTE ADULT - ASSESSMENT
62 year old male with a PMH of nonischemic cardiomyopathy/ HFrEF now s/p Heart Mate II LVAD with TV annuloplasty ring on 9/12/17. Patient was found to be hyponatremic (130) starting from 9/14/17 until today.

## 2017-10-09 NOTE — PROGRESS NOTE ADULT - PROBLEM SELECTOR PLAN 1
Euvolemic hyponatremia likely secondary to SIADH. Patient with improving serum sodium to 132 today. Pain management as per primary team. Would continue to monitor patient BMP. Continue nutritional support.

## 2017-10-09 NOTE — PROGRESS NOTE ADULT - PROBLEM SELECTOR PLAN 2
LDH stable.  discharge planning- rehab next week - Continue with ASA   - Continue with lisinopril 2.5 mg QD and Toprol XL 12.5 daily per HF.  - Pain management continue with Tramadol for pain per HF  - Daily LDH   - Continue with AC therapy on coumadin for a therapeutic INR goal 2-3  - Increase activity as tolerated   - D/C plan Rehab once medically cleared; plan of care discussed with attending

## 2017-10-09 NOTE — PROGRESS NOTE ADULT - PROBLEM SELECTOR PLAN 1
continue daily LDH   coumadin  LVAD education ongoing continue daily LDH   coumadin 4 mg tonight   d/ lisinopril   LVAD education ongoing d/c lisinopril

## 2017-10-09 NOTE — PROGRESS NOTE ADULT - PROBLEM SELECTOR PLAN 6
Pt asymptomatic, F/U urine lytes/UA.  fluid restriction - Fluid restriction  - Renal Following   - Endo Following

## 2017-10-09 NOTE — PROGRESS NOTE ADULT - SUBJECTIVE AND OBJECTIVE BOX
INTERVAL HPI/OVERNIGHT EVENTS: Patient still with intermittent abdominal pain. Endorses constipation.     Allergies    No Known Allergies    Intolerances        ADVANCE DIRECTIVES:    DNR: [x ] NO [ ] YES (Date) MOLST [ ] NO [ ] YES (Date)    MEDICATIONS  (STANDING):  aspirin enteric coated 81 milliGRAM(s) Oral daily  glycerin Suppository - Adult 1 Suppository(s) Rectal every 4 hours  lidocaine   Patch 1 Patch Transdermal daily  lisinopril 5 milliGRAM(s) Oral daily  metoprolol succinate ER 12.5 milliGRAM(s) Oral daily  mirtazapine 7.5 milliGRAM(s) Oral at bedtime  oxyCODONE  ER Tablet 10 milliGRAM(s) Oral every 12 hours  pantoprazole    Tablet 40 milliGRAM(s) Oral before breakfast  polyethylene glycol 3350 17 Gram(s) Oral at bedtime  senna 2 Tablet(s) Oral at bedtime  warfarin 3 milliGRAM(s) Oral once    MEDICATIONS  (PRN):  oxyCODONE    IR 5 milliGRAM(s) Oral every 6 hours PRN Severe Pain (7 - 10)  traMADol 25 milliGRAM(s) Oral every 6 hours PRN Moderate Pain (4 - 6)      PRESENT SYMPTOMS:  Source: [x ] Patient   [ ] Family   [ ] Team     Pain:                        [ ] No [x ] Yes             [ ] Mild [ x] Moderate [ ] Severe    Onset - gradual  Location - mid sternal incision/ abdomen  Duration - constant  Character - dull, deep  Alleviating/Aggravating - pain meds  Radiation - none  Timing -none    Dyspnea:                [x ] No [ ] Yes             [ ] Mild [ ] Moderate [ ] Severe    Anxiety:                  [x] No [ ] Yes             [ ] Mild [ ] Moderate [ ] Severe    Fatigue:                  [ ] No [x Yes             [x ] Mild [ ] Moderate [ ] Severe    Nausea:                  [x ] No [ ] Yes             [ ] Mild [ ] Moderate [ ] Severe    Loss of appetite:   [x No [ ] Yes             [ ] Mild [ ] Moderate [ ] Severe    Constipation:        [x ] No [ ] Yes             [ ] Mild [ ] Moderate [ ] Severe    Other Symptoms:  [x ] All other review of systems negative   [ ] Unable to obtain due to poor mentation     Karnofsky Performance Score/Palliative Performance Status Version 2:      40   %    PHYSICAL EXAM:  Vital Signs Last 24 Hrs  T(C): 37 (06 Oct 2017 14:00), Max: 37 (06 Oct 2017 14:00)  T(F): 98.6 (06 Oct 2017 14:00), Max: 98.6 (06 Oct 2017 14:00)  HR: 91 (06 Oct 2017 14:00) (90 - 94)  BP: --  BP(mean): 74 (06 Oct 2017 14:00) (62 - 74)  RR: 17 (06 Oct 2017 14:00) (17 - 18)  SpO2: 98% (06 Oct 2017 14:00) (97% - 99%) I&O's Summary    05 Oct 2017 07:01  -  06 Oct 2017 07:00  --------------------------------------------------------  IN: 1010 mL / OUT: 1300 mL / NET: -290 mL    06 Oct 2017 07:01  -  06 Oct 2017 15:23  --------------------------------------------------------  IN: 780 mL / OUT: 0 mL / NET: 780 mL        General:  [x ] Alert  [x ] Oriented x 3     [ ] Lethargic  [ ] Agitated   [x ] Cachexia   [ ] Unarousable  [x ] Verbal  [ ] Non-Verbal    HEENT:  [x ] Normal   [ ] Dry mouth   [ ] ET Tube    [ ] Trach  [ ] Oral lesions    Lungs:   [x ] Clear [ ] Tachypnea  [ ] Audible excessive secretions   [ ] Rhonchi        [ ] Right [ ] Left [ ] Bilateral  [ ] Crackles        [ ] Right [ ] Left [ ] Bilateral  [ ] Wheezing     [ ] Right [ ] Left [ ] Bilateral    Cardiovascular:  [x ] Regular [ ] Irregular [ ] Tachycardia   [ ] Bradycardia  [ ] Murmur [ ] Other    Abdomen: [x ] Soft  [ ] Distended   [ x] +BS  [x ] Non tender [ ] Tender  [ ]PEG   [ ]OGT/ NGT   Last BM:       Genitourinary: [x ] Normal [ ] Incontinent   [ ] Oliguria/Anuria   [ ] Landrum    Musculoskeletal:  [x ] Normal   [ ] Weakness  [ ] Bedbound/Wheelchair bound [ ] Edema    Neurological: [x ] No focal deficits  [ ] Cognitive impairment  [ ] Dysphagia [ ] Dysarthria [ ] Paresis [ ] Other     Skin: [x ] Normal   [ ] Pressure ulcer(s)    mid line incision              [ ] Rash    LABS:                        10.0   9.2   )-----------( 321      ( 06 Oct 2017 06:16 )             30.3     10-06    129<L>  |  92<L>  |  19  ----------------------------<  92  4.6   |  26  |  0.68    Ca    9.3      06 Oct 2017 06:16      PT/INR - ( 06 Oct 2017 06:16 )   PT: 32.2 sec;   INR: 2.92 ratio           Oral Intake: [ ] Unable/mouth care only [ ] Minimal [ ] Moderate [x] Full Capability    Diet: [ ] NPO [ ] Tube feeds [ ] TPN [ ] Other     RADIOLOGY & ADDITIONAL STUDIES: reviewed    REFERRALS:   [ ] Chaplaincy  [ ] Hospice  [ ] Child Life  [ ] Social Work  [ ] Case management [ ] Holistic Therapy

## 2017-10-09 NOTE — PROGRESS NOTE ADULT - PROBLEM SELECTOR PLAN 3
improving 132 today improving 132 today   question whether related to Remoran continue daily LDH   coumadin 4 mg tonight   LVAD education ongoing

## 2017-10-09 NOTE — CHART NOTE - NSCHARTNOTEFT_GEN_A_CORE
Source: Patient [X ]    Family [ ]     other [ X] RN, medical record,     Pt seen for malnutrition follow up. Pt seen c/o constipation and pain. Per RN, Pt recently provided a suppository, last BM was 2 days ago. Pt with fair PO intake at breakfast: 2 HB eggs, OJ and 1 roll with jelly. Pt on a 3 day calorie count, due 10/10. Pt is drinking Ensure enlive at times, although do to pain and constipation, Pt with limited interaction during RD interview. education deferred at this time.       Per chart, Pt with chronic systolic HF s/p LVAD, cardiac cachexia and hyponatremia, Likely SIADH.   Diet: regular Ensure enlive x3      Patient reports [X ] constipation; last BM 2 days ago. Team is aware.      PO intake:  varaible. 10-95% of meals     Source for PO intake [ X] Patient [ ] family [X ] chart [ ] staff [ ] other     Enteral /Parenteral Nutrition: None      Admission Wt: 61kg, No new wt sine 10/6. RN/PCA aware. Plan for standing Wt this morning.   % Weight Change    Pertinent Medications: MEDICATIONS  (STANDING):  aspirin enteric coated 81 milliGRAM(s) Oral daily  glycerin Suppository - Adult 1 Suppository(s) Rectal every 4 hours  lidocaine   Patch 1 Patch Transdermal daily  lisinopril 2.5 milliGRAM(s) Oral daily  metoprolol succinate ER 12.5 milliGRAM(s) Oral daily  mirtazapine 7.5 milliGRAM(s) Oral at bedtime  oxyCODONE  ER Tablet 10 milliGRAM(s) Oral every 12 hours  pantoprazole    Tablet 40 milliGRAM(s) Oral before breakfast  polyethylene glycol 3350 17 Gram(s) Oral at bedtime  senna 2 Tablet(s) Oral at bedtime  coumadin; dosed daily     MEDICATIONS  (PRN):  oxyCODONE    IR 5 milliGRAM(s) Oral every 6 hours PRN Severe Pain (7 - 10)  traMADol 25 milliGRAM(s) Oral every 6 hours PRN Moderate Pain (4 - 6)    Pertinent Labs:  Hgb/Hct:10.1/29.6-low, Na:132-low, K:4.7, Cl:93-low, BUN:19, Cr:0.68, Glucose:144-high, Ca:9.1    Skin: intact     Estimated Needs:   [X ] no change since previous assessment   [ ] recalculated:       Previous Nutrition Diagnosis:     [ ] Underweight [X ] Increased Nutrient Needs  [ X] Food & Nutrition Related Knowledge Deficit   [X ] Malnutrition          Nutrition Diagnosis is [ X] ongoing, being addressed with PO supplements, calorie counts and diet education reinforcement.        New Nutrition Diagnosis: [ X] not applicable      Recommend    1. Follow up with 3 day calorie count on 10/10  2. Continue to encourage PO intake at meal times   3. Provided food preferences within therapeutic diet restrictions   4. Continue diet education reinforcement.      Monitoring and Evaluation:     [ X] PO intake [ X] Tolerance to diet prescription [X ] weights [ X] follow up per protocol    [X ] other: RD remains available Sarah Siegler RD. Pager #[phone]

## 2017-10-09 NOTE — PROGRESS NOTE ADULT - SUBJECTIVE AND OBJECTIVE BOX
VITAL SIGNS    Telemetry:  NSR     Vital Signs Last 24 Hrs  T(C): 36.3 (10-09-17 @ 12:34), Max: 36.8 (10-08-17 @ 20:00)  T(F): 97.4 (10-09-17 @ 12:34), Max: 98.3 (10-08-17 @ 20:00)  HR: 94 (10-09-17 @ 12:34) (90 - 97)  BP: --  RR: 16 (10-09-17 @ 12:34) (16 - 18)  SpO2: 100% (10-09-17 @ 12:34) (100% - 100%)             10-08 @ 07:01  -  10-09 @ 07:00  --------------------------------------------------------  IN: 920 mL / OUT: 650 mL / NET: 270 mL    PHYSICAL EXAM      Neurology: alert and oriented x 3, nonfocal, no gross deficits    CV : (+) LVAD hum    Sternal Wound :  CDI , Stable    Lungs: CTA B/L     Abdomen: soft, nontender, nondistended, positive bowel sounds, (+) BM; RLQ drive line site with occlusive dressing C/D/I    : Voiding                Extremities: B/L LE (+) 2 DP palpable; negative calf tenderness; negative edema        aspirin enteric coated 81 milliGRAM(s) Oral daily  docusate sodium 100 milliGRAM(s) Oral three times a day  glycerin Suppository - Adult 1 Suppository(s) Rectal every 4 hours  lidocaine   Patch 1 Patch Transdermal daily  lisinopril 2.5 milliGRAM(s) Oral daily  metoprolol succinate ER 12.5 milliGRAM(s) Oral daily  mirtazapine 7.5 milliGRAM(s) Oral at bedtime  oxyCODONE    IR 5 milliGRAM(s) Oral every 6 hours PRN  oxyCODONE  ER Tablet 10 milliGRAM(s) Oral every 12 hours  pantoprazole    Tablet 40 milliGRAM(s) Oral before breakfast  phenylephrine 0.25% Suppository 1 Suppository(s) Rectal every 8 hours PRN  polyethylene glycol 3350 17 Gram(s) Oral at bedtime  senna 2 Tablet(s) Oral at bedtime  traMADol 25 milliGRAM(s) Oral every 6 hours PRN    Physical Therapy Rec:   Home  [  ]   Home w/ PT  [ X]  Rehab  [  ]    Discussed with Cardiothoracic Team at AM rounds.

## 2017-10-09 NOTE — PROGRESS NOTE ADULT - PROBLEM SELECTOR PLAN 4
Patient is full code. Patient remains frustrated about not going home and having to go to rehab; not participating in extended discussion. Will continue to follow for pain and provide support.

## 2017-10-09 NOTE — PROGRESS NOTE ADULT - PROBLEM SELECTOR PLAN 2
c/w  Oxy IR 5mg q4 prn- encouraged patient to ask if pain is still severe  would recommend increasing oxycontin ER to 15 mg BID as patient still with pain  would add colace to senna for bowel regimen to prevent opioid induced constipation

## 2017-10-09 NOTE — PROGRESS NOTE ADULT - PROBLEM SELECTOR PLAN 1
c/w lisinopril 2.5 mg QD and Toprol XL 12.5 daily per HF.  Tramadol for pain per HF - Continue with ASA   - Continue with lisinopril 2.5 mg QD and Toprol XL 12.5 daily per HF.  - Pain management continue with Tramadol for pain per HF  - Daily LDH   - Continue with AC therapy on coumadin for a therapeutic INR goal 2-3  - Increase activity as tolerated   - D/C plan Rehab once medically cleared; plan of care discussed with attending

## 2017-10-09 NOTE — PROGRESS NOTE ADULT - ASSESSMENT
Briefly, 63 y/o Creole speaking male who presented with abdominal pain and was found to be in low output heart failure with cardiogenic shock (EF 10%) underwent LVAD/TV annuloplasty for INTERMACS 2 9/12; postop had bleeding requiring PRBC, amicar, PLT, cryo. TTE 9/13 LVEDD 5.9 cm, mild-mod MR, aortic valve closed, mild RV dysfunction, mild TR. Hospital course c/b significant L sided pain; found to have L hemithorax s/p pigtail that was removed; fluid c/w exudative with lot of RBC, culture negative. Continues to have pain in L flank and frustration. Currently looks euvolemic. Labs noted for stable hyponatremia with urine studies c/w likely SIADH possibly 2/2 pain.. Currently compensated, stage D HF requiring HM2 as bridge to recovery. Briefly, 63 y/o Creole speaking male who presented with abdominal pain and was found to be in low output heart failure with cardiogenic shock (EF 10%) underwent LVAD/TV annuloplasty for INTERMACS 2 9/12; postop had bleeding requiring PRBC, amicar, PLT, cryo. TTE 9/13 LVEDD 5.9 cm, mild-mod MR, aortic valve closed, mild RV dysfunction, mild TR. Hospital course c/b significant L sided pain; found to have L hemithorax s/p pigtail that was removed; fluid c/w exudative with lot of RBC, culture negative. Continues to have pain in L flank and frustration. Currently looks euvolemic. Labs noted for stable hyponatremia with urine studies c/w likely SIADH possibly 2/2 pain.. Currently compensated, stage D HF requiring HM2 as bridge to recovery.  Pt presenting today appear euvolemic

## 2017-10-09 NOTE — PROGRESS NOTE ADULT - SUBJECTIVE AND OBJECTIVE BOX
[facility] DIVISION OF KIDNEY DISEASES AND HYPERTENSION -- FOLLOW UP NOTE  --------------------------------------------------------------------------------  Chief Complaint: Hyponatremia     24 hour events/subjective:  Patient had a bowel movement today.       PAST HISTORY  --------------------------------------------------------------------------------  No significant changes to PMH, PSH, FHx, SHx, unless otherwise noted    ALLERGIES & MEDICATIONS  --------------------------------------------------------------------------------  Allergies    No Known Allergies    Intolerances      Standing Inpatient Medications  aspirin enteric coated 81 milliGRAM(s) Oral daily  docusate sodium 100 milliGRAM(s) Oral three times a day  glycerin Suppository - Adult 1 Suppository(s) Rectal every 4 hours  lidocaine   Patch 1 Patch Transdermal daily  lisinopril 2.5 milliGRAM(s) Oral daily  metoprolol succinate ER 12.5 milliGRAM(s) Oral daily  mirtazapine 7.5 milliGRAM(s) Oral at bedtime  oxyCODONE  ER Tablet 10 milliGRAM(s) Oral every 12 hours  pantoprazole    Tablet 40 milliGRAM(s) Oral before breakfast  polyethylene glycol 3350 17 Gram(s) Oral at bedtime  senna 2 Tablet(s) Oral at bedtime    PRN Inpatient Medications  oxyCODONE    IR 5 milliGRAM(s) Oral every 6 hours PRN  phenylephrine 0.25% Suppository 1 Suppository(s) Rectal every 8 hours PRN  traMADol 25 milliGRAM(s) Oral every 6 hours PRN      REVIEW OF SYSTEMS  --------------------------------------------------------------------------------  Gen: No weakness  Skin: No rashes  Head/Eyes/Ears/Mouth: No headache  Respiratory: No dyspnea  CV: No chest pain  GI: No abdominal pain  MSK: No edema  Neuro: No dizziness/lightheadedness      All other systems were reviewed and are negative, except as noted.    VITALS/PHYSICAL EXAM  --------------------------------------------------------------------------------  T(C): 36.3 (10-09-17 @ 12:34), Max: 36.8 (10-08-17 @ 20:00)  HR: 94 (10-09-17 @ 12:34) (90 - 97)  BP: --  RR: 16 (10-09-17 @ 12:34) (16 - 18)  SpO2: 100% (10-09-17 @ 12:34) (100% - 100%)  Wt(kg): --        10-08-17 @ 07:01  -  10-09-17 @ 07:00  --------------------------------------------------------  IN: 920 mL / OUT: 650 mL / NET: 270 mL    Physical Exam:  	Gen: NAD, awake, responds slow to questions   	HEENT: supple neck  	Pulm: Decreased BS at bases B/L  	CV: RRR  	Abd: +BS, soft, nontender/nondistended  	LE: Warm, FROM, no edema  	Neuro: No focal deficits, intact gait  	Psych: flat affect   	Skin: Warm    LABS/STUDIES  --------------------------------------------------------------------------------              10.1   7.7   >-----------<  276      [10-09-17 @ 05:18]              29.6     132  |  93  |  19  ----------------------------<  144      [10-09-17 @ 05:18]  4.7   |  28  |  0.68        Ca     9.1     [10-09-17 @ 05:18]      PT/INR: PT 27.2 , INR 2.45       [10-09-17 @ 05:18]          [10-08-17 @ 06:08]    Creatinine Trend:  SCr 0.68 [10-09 @ 05:18]  SCr 0.69 [10-08 @ 06:08]  SCr 0.61 [10-07 @ 05:07]  SCr 0.68 [10-06 @ 06:16]  SCr 0.60 [10-05 @ 06:18]    Urinalysis - [10-04-17 @ 14:24]      Color Yellow / Appearance SL Turbid / SG 1.021 / pH 7.0      Gluc Negative / Ketone Negative  / Bili Negative / Urobili Negative       Blood Negative / Protein Negative / Leuk Est Negative / Nitrite Negative      RBC 0-2 / WBC 2-5 / Hyaline  / Gran  / Sq Epi  / Non Sq Epi  / Bacteria     Urine Creatinine 191      [10-03-17 @ 17:40]  Urine Sodium 49      [10-06-17 @ 19:35]  Urine Potassium >100      [10-03-17 @ 17:40]  Urine Chloride 90      [10-03-17 @ 17:40]  Urine Osmolality 784      [10-03-17 @ 17:40]    Iron 51, TIBC 261, %sat 20      [09-01-17 @ 21:40]  Ferritin 286.0      [09-01-17 @ 20:17]  HbA1c 6.2      [09-01-17 @ 00:08]  TSH 0.81      [10-05-17 @ 07:13]  Lipid: chol 185, TG 67, HDL 49,       [08-26-17 @ 08:52]

## 2017-10-10 DIAGNOSIS — K59.01 SLOW TRANSIT CONSTIPATION: ICD-10-CM

## 2017-10-10 LAB
ANION GAP SERPL CALC-SCNC: 12 MMOL/L — SIGNIFICANT CHANGE UP (ref 5–17)
BUN SERPL-MCNC: 18 MG/DL — SIGNIFICANT CHANGE UP (ref 7–23)
CALCIUM SERPL-MCNC: 9.5 MG/DL — SIGNIFICANT CHANGE UP (ref 8.4–10.5)
CHLORIDE SERPL-SCNC: 93 MMOL/L — LOW (ref 96–108)
CO2 SERPL-SCNC: 27 MMOL/L — SIGNIFICANT CHANGE UP (ref 22–31)
CREAT SERPL-MCNC: 0.57 MG/DL — SIGNIFICANT CHANGE UP (ref 0.5–1.3)
GLUCOSE SERPL-MCNC: 94 MG/DL — SIGNIFICANT CHANGE UP (ref 70–99)
HCT VFR BLD CALC: 28.8 % — LOW (ref 39–50)
HGB BLD-MCNC: 9.8 G/DL — LOW (ref 13–17)
INR BLD: 2.31 RATIO — HIGH (ref 0.88–1.16)
LDH SERPL L TO P-CCNC: 350 U/L — HIGH (ref 50–242)
MCHC RBC-ENTMCNC: 32.4 PG — SIGNIFICANT CHANGE UP (ref 27–34)
MCHC RBC-ENTMCNC: 34.1 GM/DL — SIGNIFICANT CHANGE UP (ref 32–36)
MCV RBC AUTO: 95.1 FL — SIGNIFICANT CHANGE UP (ref 80–100)
PLATELET # BLD AUTO: 278 K/UL — SIGNIFICANT CHANGE UP (ref 150–400)
POTASSIUM SERPL-MCNC: 4.5 MMOL/L — SIGNIFICANT CHANGE UP (ref 3.5–5.3)
POTASSIUM SERPL-SCNC: 4.5 MMOL/L — SIGNIFICANT CHANGE UP (ref 3.5–5.3)
PROTHROM AB SERPL-ACNC: 25.4 SEC — HIGH (ref 9.8–12.7)
RBC # BLD: 3.03 M/UL — LOW (ref 4.2–5.8)
RBC # FLD: 13 % — SIGNIFICANT CHANGE UP (ref 10.3–14.5)
SODIUM SERPL-SCNC: 132 MMOL/L — LOW (ref 135–145)
WBC # BLD: 8.3 K/UL — SIGNIFICANT CHANGE UP (ref 3.8–10.5)
WBC # FLD AUTO: 8.3 K/UL — SIGNIFICANT CHANGE UP (ref 3.8–10.5)

## 2017-10-10 PROCEDURE — 99233 SBSQ HOSP IP/OBS HIGH 50: CPT

## 2017-10-10 PROCEDURE — 99233 SBSQ HOSP IP/OBS HIGH 50: CPT | Mod: 25

## 2017-10-10 PROCEDURE — 99232 SBSQ HOSP IP/OBS MODERATE 35: CPT

## 2017-10-10 PROCEDURE — 93750 INTERROGATION VAD IN PERSON: CPT

## 2017-10-10 RX ORDER — SENNA PLUS 8.6 MG/1
3 TABLET ORAL AT BEDTIME
Qty: 0 | Refills: 0 | Status: DISCONTINUED | OUTPATIENT
Start: 2017-10-10 | End: 2017-10-22

## 2017-10-10 RX ORDER — OXYCODONE HYDROCHLORIDE 5 MG/1
10 TABLET ORAL EVERY 8 HOURS
Qty: 0 | Refills: 0 | Status: DISCONTINUED | OUTPATIENT
Start: 2017-10-10 | End: 2017-10-17

## 2017-10-10 RX ORDER — NALOXONE HYDROCHLORIDE 4 MG/.1ML
0.1 SPRAY NASAL
Qty: 0 | Refills: 0 | Status: DISCONTINUED | OUTPATIENT
Start: 2017-10-10 | End: 2017-10-22

## 2017-10-10 RX ORDER — OXYCODONE HYDROCHLORIDE 5 MG/1
5 TABLET ORAL EVERY 4 HOURS
Qty: 0 | Refills: 0 | Status: DISCONTINUED | OUTPATIENT
Start: 2017-10-10 | End: 2017-10-17

## 2017-10-10 RX ORDER — WARFARIN SODIUM 2.5 MG/1
4 TABLET ORAL ONCE
Qty: 0 | Refills: 0 | Status: COMPLETED | OUTPATIENT
Start: 2017-10-10 | End: 2017-10-10

## 2017-10-10 RX ORDER — LIDOCAINE 4 G/100G
1 CREAM TOPICAL EVERY 24 HOURS
Qty: 0 | Refills: 0 | Status: DISCONTINUED | OUTPATIENT
Start: 2017-10-10 | End: 2017-10-25

## 2017-10-10 RX ADMIN — OXYCODONE HYDROCHLORIDE 10 MILLIGRAM(S): 5 TABLET ORAL at 21:39

## 2017-10-10 RX ADMIN — PANTOPRAZOLE SODIUM 40 MILLIGRAM(S): 20 TABLET, DELAYED RELEASE ORAL at 06:10

## 2017-10-10 RX ADMIN — OXYCODONE HYDROCHLORIDE 5 MILLIGRAM(S): 5 TABLET ORAL at 18:27

## 2017-10-10 RX ADMIN — WARFARIN SODIUM 4 MILLIGRAM(S): 2.5 TABLET ORAL at 21:39

## 2017-10-10 RX ADMIN — MIRTAZAPINE 7.5 MILLIGRAM(S): 45 TABLET, ORALLY DISINTEGRATING ORAL at 21:39

## 2017-10-10 RX ADMIN — OXYCODONE HYDROCHLORIDE 10 MILLIGRAM(S): 5 TABLET ORAL at 22:10

## 2017-10-10 RX ADMIN — Medication 100 MILLIGRAM(S): at 21:40

## 2017-10-10 RX ADMIN — Medication 81 MILLIGRAM(S): at 12:54

## 2017-10-10 RX ADMIN — Medication 100 MILLIGRAM(S): at 12:54

## 2017-10-10 RX ADMIN — Medication 100 MILLIGRAM(S): at 06:10

## 2017-10-10 RX ADMIN — OXYCODONE HYDROCHLORIDE 10 MILLIGRAM(S): 5 TABLET ORAL at 06:11

## 2017-10-10 RX ADMIN — Medication 12.5 MILLIGRAM(S): at 06:10

## 2017-10-10 RX ADMIN — OXYCODONE HYDROCHLORIDE 5 MILLIGRAM(S): 5 TABLET ORAL at 17:57

## 2017-10-10 RX ADMIN — OXYCODONE HYDROCHLORIDE 10 MILLIGRAM(S): 5 TABLET ORAL at 06:40

## 2017-10-10 NOTE — PROGRESS NOTE ADULT - SUBJECTIVE AND OBJECTIVE BOX
Behavioral Cardiology Progress Note     HPI:  Mr. Quispe is a 62 year old man with a nonischemic cardiomyopathy with ACC/AHA stage D congestive heart failure who underwent LVAD/TV annuloplasty for INTERMACS 2 on 9/12/17.   His postoperative course was complicated by bleeding requiring transfusions, which has stabilized.      Behavioral Health Assessment:     Mood:  "no one comes to visit me."           Current stressors:   Adjustment to LVAD   Hospitalization    Pain management     Support system/family support:   Family support (family members -sister and her family) are currently in [facility] until 10/22/17.  Pt’s niece is an RN at [employer].       Coping strategies:    Limited; reports he tends to keep things to himself, reluctant to ask others for help.        Understanding of medical illness and treatment plan:   Continues with process of LVAD education with LVAD coordinator.        MSE:   Seen resting in bed.  A&Ox3.  Fairly related with intermittent eye contact.  Thought process goal directed.  No abnormal thought content, denies s/i.  Mood: dysphoric.  Affect anxious.  Insight and judgment adequate.

## 2017-10-10 NOTE — PROGRESS NOTE BEHAVIORAL HEALTH - NSBHFUPINTERVALHXFT_PSY_A_CORE
No acute events or PRN medications given. Pt found walking in hallway with physical therapy. Afterwards, therapist reported pt required less breaks and was more engaged this morning. Upon interview, pt reports taking Remeron last night, tolerating it but sleeping poorly last night because of pain in his chest wall. He remains somewhat irritable, but engages more with interviewer. No SI/HI elicited. Nursing reports pt slept better last night.
No acute events or PRN medications given. Pt was seen walking around the floor with a walker. Continues to sleep "ok." No SI/HI elicited, engages in physical therapy.
No acute events or PRN medications given. Pt found laying in bed, reports no new complaints. Continues to sleep "ok." No SI/HI elicited. Nursing reports pt has been quiet, requiring encouragement to engage in physical therapy.

## 2017-10-10 NOTE — PROGRESS NOTE ADULT - PROBLEM SELECTOR PLAN 1
No CT evidence of source. ?2/2 to post procedural pain vs LVAD itself.    Will increase Oxycontin to 10 mg PO q 8h.  Oxycodone 5 mg PO q 4 PRN   Monitor and hold for sedation/respiratory depression.

## 2017-10-10 NOTE — PROGRESS NOTE ADULT - PROBLEM SELECTOR PLAN 2
- Continue with ASA   - Continue with Toprol XL 12.5 daily per HF.  - Pain management continue with Tramadol for pain per HF  - Daily LDH   - Continue with AC therapy on coumadin for a therapeutic INR goal 2-3  - Increase activity as tolerated   - D/C plan Rehab once medically cleared; plan of care discussed with attending

## 2017-10-10 NOTE — PROGRESS NOTE ADULT - PROBLEM SELECTOR PLAN 2
daily LDH  coumadin  daily dosing daily LDH  coumadin at current dose for goal INR of 2-2.5  continue current dose of aspirin at 81 mg daily

## 2017-10-10 NOTE — PROGRESS NOTE BEHAVIORAL HEALTH - NSBHCONSULTMEDS_PSY_A_CORE FT
1) Counseling, psychoeducation and support provided  2) cont Remeron 7.5mg hs   3) We will obtain collateral from family when they return from [facility]

## 2017-10-10 NOTE — PROGRESS NOTE ADULT - SUBJECTIVE AND OBJECTIVE BOX
Interval Events: Admits to mild diffuse abdominal pain which is unchanged. Denies nausea, vomiting, (+) bms.    HPI:62M with no PMHx here with chest pain. Began last week, described as pleuritic, worsens with cough and deep inspiration. Has been having SOB as well, dry cough. Recently vacationed in [facility] came back today. Saw doctor in [facility] but unclear what workup was. No fever, sick contacts, abd pain. CP described as midsternal, nonradiating. Has 25 pack year smoking history.  CTPA does not reveal PE, Pulm congestion noted.     MEDICATIONS  (STANDING):  aspirin enteric coated 81 milliGRAM(s) Oral daily  docusate sodium 100 milliGRAM(s) Oral three times a day  glycerin Suppository - Adult 1 Suppository(s) Rectal every 4 hours  lidocaine   Patch 1 Patch Transdermal daily  metoprolol succinate ER 12.5 milliGRAM(s) Oral daily  mirtazapine 7.5 milliGRAM(s) Oral at bedtime  oxyCODONE  ER Tablet 10 milliGRAM(s) Oral every 12 hours  pantoprazole    Tablet 40 milliGRAM(s) Oral before breakfast  polyethylene glycol 3350 17 Gram(s) Oral at bedtime  senna 2 Tablet(s) Oral at bedtime    MEDICATIONS  (PRN):  oxyCODONE    IR 5 milliGRAM(s) Oral every 6 hours PRN Severe Pain (7 - 10)  phenylephrine 0.25% Suppository 1 Suppository(s) Rectal every 8 hours PRN Hemorrhoids  traMADol 25 milliGRAM(s) Oral every 6 hours PRN Moderate Pain (4 - 6)      Allergies    No Known Allergies    Intolerances        Review of Systems:    General:  No wt loss, fevers, chills, night sweats, fatigue,   Eyes:  Good vision, no reported pain  ENT:  No sore throat, pain, runny nose, dysphagia  CV:  No pain, palpitations, hypo/hypertension  Resp:  No dyspnea, cough, tachypnea, wheezing  GI:  + mild diffuse abdominal pain, No nausea, No vomiting, No diarrhea, No constipation, No weight loss, No fever, No pruritis, No rectal bleeding, No tarry stools, No dysphagia,  :  No pain, bleeding, incontinence, nocturia  Muscle:  No pain, weakness  Neuro:  No weakness, tingling, memory problems  Psych:  No fatigue, insomnia, mood problems, depression  Endocrine:  No polyuria, polydipsia, cold/heat intolerance  Heme:  No petechiae, ecchymosis, easy bruisability  Skin:  No rash, tattoos, scars, edema        Vital Signs Last 24 Hrs  T(C): 36.2 (10 Oct 2017 09:30), Max: 36.9 (09 Oct 2017 16:41)  T(F): 97.2 (10 Oct 2017 09:30), Max: 98.5 (09 Oct 2017 16:41)  HR: 102 (10 Oct 2017 09:30) (91 - 102)  BP: --  BP(mean): 74 (10 Oct 2017 09:30) (70 - 74)  RR: 18 (10 Oct 2017 09:30) (16 - 18)  SpO2: 100% (10 Oct 2017 09:30) (100% - 100%)    PHYSICAL EXAM:    GENERAL:  Appears stated age, well-groomed, well-nourished, no distress  HEENT:  NC/AT,  conjunctivae clear and pink, no thyromegaly, nodules, adenopathy, no JVD, sclera -anicteric  CHEST:  Full & symmetric excursion, no increased effort, breath sounds clear  HEART:  Regular rhythm, S1, S2, no murmur/rub/S3/S4, no abdominal bruit, no edema  ABDOMEN:  Soft, mild diffuse tenderness, non-distended, normoactive bowel sounds,  no masses ,no hepato-splenomegaly, no signs of chronic liver disease  EXTREMITIES:  no cyanosis, clubbing or edema  SKIN:  No rash/erythema/ecchymoses/petechiae/wounds/abscess/warm/dry  NEURO:  Alert, oriented, no asterixis, no tremor, no encephalopathy        LABS:                        9.8    8.3   )-----------( 278      ( 10 Oct 2017 05:54 )             28.8     10-10    132<L>  |  93<L>  |  18  ----------------------------<  94  4.5   |  27  |  0.57    Ca    9.5      10 Oct 2017 05:54      PT/INR - ( 10 Oct 2017 05:54 )   PT: 25.4 sec;   INR: 2.31 ratio               RADIOLOGY & ADDITIONAL TESTS:

## 2017-10-10 NOTE — PROGRESS NOTE ADULT - ASSESSMENT
Briefly, 61 y/o Creole speaking male who presented with abdominal pain and was found to be in low output heart failure with cardiogenic shock (EF 10%) underwent LVAD/TV annuloplasty for INTERMACS 2 9/12; postop had bleeding requiring PRBC, amicar, PLT, cryo. TTE 9/13 LVEDD 5.9 cm, mild-mod MR, aortic valve closed, mild RV dysfunction, mild TR. Hospital course c/b significant L sided pain; found to have L hemithorax s/p pigtail that was removed; fluid c/w exudative with lot of RBC, culture negative. Continues to have pain in L flank and frustration. Currently looks euvolemic. Labs noted for stable hyponatremia with urine studies c/w likely SIADH possibly 2/2 pain.. Currently compensated, stage D HF requiring HM2 as bridge to recovery. Briefly, 61 y/o Creole speaking male who presented with abdominal pain and was found to be in low output heart failure with cardiogenic shock (EF 10%) underwent LVAD/TV annuloplasty for INTERMACS 2 9/12; postop had bleeding requiring PRBC, amicar, PLT, cryo. TTE 9/13 LVEDD 5.9 cm, mild-mod MR, aortic valve closed, mild RV dysfunction, mild TR. Hospital course c/b significant L sided pain; found to have L hemithorax s/p pigtail that was removed; fluid c/w exudative with lot of RBC, culture negative. Continues to have pain in L flank and frustration. Currently looks euvolemic. Labs noted for stable hyponatremia with urine studies c/w likely SIADH possibly 2/2 pain.. Currently compensated, stage D HF requiring HM2 as bridge to recovery.   Pt remains frustrated by hospital course Briefly, 61 y/o Creole speaking male who presented with abdominal pain and was found to be in low output heart failure with cardiogenic shock (EF 10%) underwent LVAD/TV annuloplasty for INTERMACS 2 9/12; postop had bleeding requiring PRBC, amicar, PLT, cryo. TTE 9/13 LVEDD 5.9 cm, mild-mod MR, aortic valve closed, mild RV dysfunction, mild TR. Hospital course c/b significant L sided pain; found to have L hemithorax s/p pigtail that was removed; fluid c/w exudative with lot of RBC, culture negative. Continues to have pain in L flank and frustration. Currently looks euvolemic. Labs noted for stable hyponatremia with urine studies c/w likely SIADH possibly 2/2 pain.. Currently compensated, stage D HF requiring HM2 as bridge to recovery.   Pt remains frustrated by hospital course remains  withdrawn but is euvolemic, maps improved off lisinopril,  LDH stable , reports slight improvement  aches and pains 63 y/o Creole speaking male who presented with abdominal pain and was found to be in low output heart failure with cardiogenic shock (EF 10%) underwent LVAD/TV annuloplasty for INTERMACS 2 9/12; postop had bleeding requiring PRBC, amicar, PLT, cryo. TTE 9/13 LVEDD 5.9 cm, mild-mod MR, aortic valve closed, mild RV dysfunction, mild TR. Hospital course c/b significant L sided pain; found to have L hemithorax s/p pigtail that was removed; fluid c/w exudative with lot of RBC, culture negative. Continues to have pain in L flank and frustration. Currently looks euvolemic. Labs noted for stable hyponatremia with urine studies c/w likely SIADH possibly 2/2 pain.. Currently compensated, stage D HF requiring HM2 as bridge to recovery.   Pt remains frustrated by hospital course remains  withdrawn but is euvolemic, maps improved off lisinopril,  LDH stable , reports slight improvement  aches and pains

## 2017-10-10 NOTE — PROGRESS NOTE ADULT - SUBJECTIVE AND OBJECTIVE BOX
INTERVAL HPI/OVERNIGHT EVENTS: Patient still with intermittent abdominal pain. Endorses constipation.     Allergies    No Known Allergies    Intolerances        ADVANCE DIRECTIVES:    DNR: [x ] NO [ ] YES (Date) MOLST [ ] NO [ ] YES (Date)    MEDICATIONS  (STANDING):  aspirin enteric coated 81 milliGRAM(s) Oral daily  docusate sodium 100 milliGRAM(s) Oral three times a day  glycerin Suppository - Adult 1 Suppository(s) Rectal every 4 hours  lidocaine   Patch 1 Patch Transdermal daily  lidocaine   Patch 1 Patch Transdermal every 24 hours  metoprolol succinate ER 12.5 milliGRAM(s) Oral daily  mirtazapine 7.5 milliGRAM(s) Oral at bedtime  oxyCODONE  ER Tablet 10 milliGRAM(s) Oral every 8 hours  pantoprazole    Tablet 40 milliGRAM(s) Oral before breakfast  polyethylene glycol 3350 17 Gram(s) Oral at bedtime  senna 3 Tablet(s) Oral at bedtime  warfarin 4 milliGRAM(s) Oral once    MEDICATIONS  (PRN):  naloxone Injectable 0.1 milliGRAM(s) IV Push every 3 minutes PRN Sedation and respiratory depression RR < 11  oxyCODONE    IR 5 milliGRAM(s) Oral every 4 hours PRN Mild, Moderate, Severe pain  phenylephrine 0.25% Suppository 1 Suppository(s) Rectal every 8 hours PRN Hemorrhoids      PRESENT SYMPTOMS:  Source: [x ] Patient   [ ] Family   [ ] Team     Pain:                        [ ] No [x ] Yes             [ ] Mild [x ] Moderate  to  Severe    Onset - gradual  Location - left lower costal margin. Left Flank   Duration - constant  Character - dull, deep  Alleviating/Aggravating - pain meds/not able to indicate   Radiation - towards mid abdominal region.   Timing -none    Dyspnea:                [x ] No [ ] Yes             [ ] Mild [ ] Moderate [ ] Severe    Anxiety:                  [x] No [ ] Yes             [ ] Mild [ ] Moderate [ ] Severe    Fatigue:                  [ ] No [x Yes             [x ] Mild [ ] Moderate [ ] Severe    Nausea:                  [x ] No [ ] Yes             [ ] Mild [ ] Moderate [ ] Severe    Loss of appetite:   [x No [ ] Yes             [ ] Mild [ ] Moderate [ ] Severe    Constipation:        [x ] No [ ] Yes             [ ] Mild [ ] Moderate [ ] Severe    Other Symptoms:  [x ] All other review of systems negative   [ ] Unable to obtain due to poor mentation     Karnofsky Performance Score/Palliative Performance Status Version 2:      40   %    PHYSICAL EXAM:  Vital Signs Last 24 Hrs  T(C): 36.7 (10 Oct 2017 14:00), Max: 36.7 (10 Oct 2017 04:54)  T(F): 98 (10 Oct 2017 14:00), Max: 98 (10 Oct 2017 04:54)  HR: 100 (10 Oct 2017 14:00) (91 - 102)  BP: --  BP(mean): 70 (10 Oct 2017 14:00) (70 - 74)  RR: 18 (10 Oct 2017 14:00) (16 - 18)  SpO2: 100% (10 Oct 2017 14:00) (100% - 100%)        General:  [x ] Alert  [x ] Oriented x 3     [ ] Lethargic  [ ] Agitated   [x ] Cachexia   [ ] Unarousable  [x ] Verbal  [ ] Non-Verbal [x] acute distress due to pain 	    HEENT:  [x ] Normal   [ ] Dry mouth   [ ] ET Tube    [ ] Trach  [ ] Oral lesions    Lungs:   [x ] Clear [ ] Tachypnea  [ ] Audible excessive secretions   [ ] Rhonchi        [ ] Right [ ] Left [ ] Bilateral  [ ] Crackles        [ ] Right [ ] Left [ ] Bilateral  [ ] Wheezing     [ ] Right [ ] Left [ ] Bilateral    Cardiovascular:  [x ] Regular [ ] Irregular [ ] Tachycardia   [ ] Bradycardia  [ ] Murmur [ ] Other. Mid sternal scar.     Abdomen: [x ] Soft  [ ] Distended   [ x] +BS  [x ] Non tender [x ] Tender over Left flank  [ ]PEG   [ ]OGT/ NGT   Last BM:  10/9     Genitourinary: [x ] Normal [ ] Incontinent   [ ] Oliguria/Anuria   [ ] Landrum    Musculoskeletal:  [x ] Normal   [ ] Weakness  [ ] Bedbound/Wheelchair bound [ ] Edema    Neurological: [x ] No focal deficits  [ ] Cognitive impairment  [ ] Dysphagia [ ] Dysarthria [ ] Paresis [ ] Other     Skin: [x ] Normal   [ ] Pressure ulcer(s)    mid line incision              [ ] Rash    LABS:                                              9.8    8.3   )-----------( 278      ( 10 Oct 2017 05:54 )             28.8          Oral Intake: [ ] Unable/mouth care only [ ] Minimal [ ] Moderate [x] Full Capability    Diet: [ ] NPO [ ] Tube feeds [ ] TPN [ ] Other     RADIOLOGY & ADDITIONAL STUDIES: reviewed    REFERRALS:   [ ] Chaplaincy  [ ] Hospice  [ ] Child Life  [ ] Social Work  [ ] Case management [ ] Holistic Therapy

## 2017-10-10 NOTE — PROGRESS NOTE ADULT - ASSESSMENT
61 y/o Creole speaking male who presented with abdominal pain and was found to be in low output heart failure with cardiogenic shock (EF 10%) s/p 9/12 LVAD/TV annuloplasty for INTERMACS 2   Postop Course:   Acute blood loss anemia 2/2 blood loss postop -->requiring PRBC, amicar, PLT, cryo.  TTE 9/13 LVEDD 5.9 cm, mild-mod MR, aortic valve closed, mild RV dysfunction, mild TR.   c/o L sided pain; found to have L hemithorax s/p pigtail that was removed; fluid c/w exudative with lot of RBC,   (+) Hyponatremia - renal consulted- urine studies sent / Endocrine following for SIADH work up  10/6 Random Urine: 49; Patient euthyroid and not adrenally insufficient -->Patient dx with SIADH, on fluid restriction.  (+) depressive mood --> psych following; Remeron initiated; started at 7.5 mg HS; avoid up titrating 2/2 risk of worsening hyponatremia    LVAD interrogated w/o events.   10/9 Small amount of rectal bleeding after bowel movement; (+) internal hemorrhoids --> Continue bowel regimen / Start Preparation H suppository TID PRN.   No further bleeding noted. pt ambulating with a walker.  Psych follow up pending  Lisinopril d/c yesterday for hypotension, stable

## 2017-10-10 NOTE — PROGRESS NOTE BEHAVIORAL HEALTH - NSBHCHARTREVIEWLAB_PSY_A_CORE FT
10-06    129<L>  |  92<L>  |  19  ----------------------------<  92  4.6   |  26  |  0.68    Ca    9.3      06 Oct 2017 06:16    CBC Full  -  ( 06 Oct 2017 06:16 )  WBC Count : 9.2 K/uL  Hemoglobin : 10.0 g/dL  Hematocrit : 30.3 %  Platelet Count - Automated : 321 K/uL  Mean Cell Volume : 95.8 fl  Mean Cell Hemoglobin : 31.6 pg  Mean Cell Hemoglobin Concentration : 33.0 gm/dL
10-03    127<L>  |  89<L>  |  14  ----------------------------<  113<H>  5.0   |  25  |  0.60    Ca    9.3      03 Oct 2017 05:33                          9.9    10.8  )-----------( 361      ( 03 Oct 2017 05:33 )             29.6
9.8    8.3   )-----------( 278      ( 10 Oct 2017 05:54 )             28.8       10-10    132<L>  |  93<L>  |  18  ----------------------------<  94  4.5   |  27  |  0.57    Ca    9.5      10 Oct 2017 05:54

## 2017-10-10 NOTE — PROGRESS NOTE BEHAVIORAL HEALTH - NSBHCHARTREVIEWVS_PSY_A_CORE FT
Vital Signs Last 24 Hrs  T(C): 36.9 (06 Oct 2017 06:00), Max: 37.1 (05 Oct 2017 10:00)  T(F): 98.4 (06 Oct 2017 06:00), Max: 98.8 (05 Oct 2017 10:00)  HR: 90 (06 Oct 2017 06:00) (90 - 94)  BP: --  BP(mean): 72 (06 Oct 2017 06:00) (62 - 74)  RR: 17 (06 Oct 2017 06:00) (17 - 18)  SpO2: 98% (06 Oct 2017 06:00) (98% - 99%)
Vital Signs Last 24 Hrs  T(C): 36.5 (03 Oct 2017 10:00), Max: 37.1 (02 Oct 2017 20:14)  T(F): 97.7 (03 Oct 2017 10:00), Max: 98.8 (02 Oct 2017 20:14)  HR: 94 (03 Oct 2017 10:00) (94 - 98)  BP: --  BP(mean): 66 (03 Oct 2017 11:47) (66 - 80)  RR: 18 (03 Oct 2017 10:00) (17 - 18)  SpO2: 98% (03 Oct 2017 10:00) (98% - 100%)
T(C): 36.2 (10-10-17 @ 09:30), Max: 36.9 (10-09-17 @ 16:41)  HR: 102 (10-10-17 @ 09:30) (91 - 102)  BP: --  RR: 18 (10-10-17 @ 09:30) (16 - 18)  SpO2: 100% (10-10-17 @ 09:30) (100% - 100%)  Wt(kg): --

## 2017-10-10 NOTE — PROGRESS NOTE ADULT - PROBLEM SELECTOR PLAN 4
On Colace, Senna, and Miralax ATC. Will increase Senna to 3 tabs HS. Consider DC Glycerine  suppository and starting Dulcolax HI PRN.

## 2017-10-10 NOTE — PROGRESS NOTE BEHAVIORAL HEALTH - NSBHATTESTSEENBY_PSY_A_CORE
Attending Psychiatrist supervising NP/Trainee, meeting pt...
attending Psychiatrist without NP/Trainee
Attending Psychiatrist supervising NP/Trainee, meeting pt...

## 2017-10-10 NOTE — PROGRESS NOTE BEHAVIORAL HEALTH - NSBHCONSULTMEDAGITATION_PSY_A_CORE FT
Haldol 2mg PO/IV/IM Q6 PRN with Ativan 1mg PO/IV/IM Q6 PRN

## 2017-10-10 NOTE — CHART NOTE - NSCHARTNOTEFT_GEN_A_CORE
Pt seen for 3 day calorie count review.     Day 1: 10/7: 634kcals and 19gm protein  Day 2: 10/8: 1123 kcals and 88gm protein  Day 3: 10/9: 915 kcals and 40 gm protein   3 day average: 890kcals and 49gm protein. Pt's estimated intake meets 60% of calorie needs and 81% of protein needs.     RD discussed results with Pt, encouraged adequate PO intake at meal times and intake of Ensure enlive x3 in between meals.   Heart failure team made aware of calorie count results; will continue to encourage adequate PO intake.    Nutrition Dx of malnutrition, increased nutrient needs and nutrition related knowledge deficit continue.   RD remains available to monitor PO intake, wt, labs and diet education   Sarah Siegler RD. Pager #[phone]

## 2017-10-10 NOTE — PROGRESS NOTE ADULT - ASSESSMENT
Appears to be more engaged with some staff members but requires ongoing encouragement to eat and ambulate.   Prefers to remain in darkened room.  Encouraged to go to LVAD meeting today to meet other patients with LVAD.  Appetite poor; does not like hospital food.  Endorsed frustration with hospitalization and lack of family support.  Mood dysphoric, "no one comes to see me.”  Taking Remeron 7.5mg at bedtime.         Recommendations:  Will benefit from additional coping strategies to manage current stressors   Behavioral Cardiology will continue to follow

## 2017-10-10 NOTE — PROGRESS NOTE BEHAVIORAL HEALTH - SUMMARY
Patient is a 61 y/o Male from [facility], with no significant PPHx and no prior documented PMHx, admitted with cardiogenic shock secondary to nonischemic dilated cardiomyopathy. Psychiatry consulted for evaluation of depressed mood.    Pt continues to tolerate Remeron, but remains reclusive, disinterested, and sleeping better but possibly not adequately.     1) Counseling, psychoeducation and support provided  2) Increase Remeron to 15mg PO QHS  3) We will obtain collateral from family when they return from [facility]
Patient is a 61 y/o Male from [facility], with no significant PPHx and no prior documented PMHx, admitted with cardiogenic shock secondary to nonischemic dilated cardiomyopathy. Psychiatry consulted for evaluation of depressed mood. Pt continues to tolerate Remeron, working better with physical therapy. sleeping better.
Patient is a 63 y/o Male from [facility], with no significant PPHx and no prior documented PMHx, admitted with cardiogenic shock secondary to nonischemic dilated cardiomyopathy, severely reduced LV systolic function (LVEF 10%), s/p  left ventricular assist system (LVAS)  on 9/12/2017. Psychiatry consulted for evaluation of depressed mood. Pt was initiated on Remeron, took his first dose last night. Although pt denies improvement in sleep, nursing confirms that he slept well and physical therapist reports improved tolerance to exercise.     1) Counseling, psychoeducation and support provided  2) Continue Remeron 7.5mg PO QHS with plans to titrate to therapeutic dose  3) We will obtain collateral from family when they return from [facility]

## 2017-10-10 NOTE — PROGRESS NOTE ADULT - PROBLEM SELECTOR PLAN 1
- Continue with ASA   - Continue with lisinopril 2.5 mg QD and Toprol XL 12.5 daily per HF.  - Pain management continue with Tramadol for pain per HF  - Daily LDH   - Continue with AC therapy on coumadin for a therapeutic INR goal 2-3  - Increase activity as tolerated   - D/C plan Rehab once medically cleared; plan of care discussed with attending

## 2017-10-10 NOTE — PROGRESS NOTE ADULT - SUBJECTIVE AND OBJECTIVE BOX
im tired now    VITAL SIGNS    Telemetry:  nsr 80    Vital Signs Last 24 Hrs  T(C): 36.2 (10-10-17 @ 09:30), Max: 36.9 (10-09-17 @ 16:41)  T(F): 97.2 (10-10-17 @ 09:30), Max: 98.5 (10-09-17 @ 16:41)  HR: 102 (10-10-17 @ 09:30) (91 - 102)  BP: --  74-80  RR: 18 (10-10-17 @ 09:30) (16 - 18)  SpO2: 100% (10-10-17 @ 09:30) (100% - 100%)                   10-09 @ 07:01  -  10-10 @ 07:00  --------------------------------------------------------  IN: 1060 mL / OUT: 900 mL / NET: 160 mL          Daily     Daily Weight in k.3 (10 Oct 2017 10:07)        CAPILLARY BLOOD GLUCOSE                      Coumadin    [x ] YES          [  ]      NO         Reason:                               PHYSICAL EXAM        Neurology: alert and oriented x 3, nonfocal, no gross deficits  CV : S1 S2 , RRR   Sternal Wound :  CDI , Stable  Lungs: cta  Abdomen: soft, nontender, nondistended, positive bowel sounds, last bowel movement 10/9  :     voiding        Extremities:  -edema, - calve tenderness             aspirin enteric coated 81 milliGRAM(s) Oral daily  docusate sodium 100 milliGRAM(s) Oral three times a day  glycerin Suppository - Adult 1 Suppository(s) Rectal every 4 hours  lidocaine   Patch 1 Patch Transdermal daily  metoprolol succinate ER 12.5 milliGRAM(s) Oral daily  mirtazapine 7.5 milliGRAM(s) Oral at bedtime  oxyCODONE    IR 5 milliGRAM(s) Oral every 6 hours PRN  oxyCODONE  ER Tablet 10 milliGRAM(s) Oral every 12 hours  pantoprazole    Tablet 40 milliGRAM(s) Oral before breakfast  phenylephrine 0.25% Suppository 1 Suppository(s) Rectal every 8 hours PRN  polyethylene glycol 3350 17 Gram(s) Oral at bedtime  senna 2 Tablet(s) Oral at bedtime  traMADol 25 milliGRAM(s) Oral every 6 hours PRN                    Physical Therapy Rec:   Home  [  ]   Home w/ PT  [  ]  Rehab  [ x ]  Discussed with Cardiothoracic Team at AM rounds.

## 2017-10-10 NOTE — PROGRESS NOTE ADULT - ASSESSMENT
Briefly, 61 y/o Creole speaking male who presented with abdominal pain and was found to be in low output heart failure with cardiogenic shock (EF 10%) underwent LVAD/TV annuloplasty for INTERMACS 2 9/12; palliative care called for pain control/ psychosocial support.

## 2017-10-10 NOTE — PROGRESS NOTE ADULT - SUBJECTIVE AND OBJECTIVE BOX
Chief complaint  Patient is a 62y old  Male who presents with a chief complaint of SOOB (25 Aug 2017 22:27)   Review of systems  Patient in bed, comfortable, no fever,  no hypoglycemia.    Labs and Fingersticks    CAPILLARY BLOOD GLUCOSE    Anion Gap, Serum: 12 (10-10 @ 05:54)  Anion Gap, Serum: 11 (10-09 @ 05:18)      Calcium, Total Serum: 9.5 (10-10 @ 05:54)  Calcium, Total Serum: 9.1 (10-09 @ 05:18)          10-10    132<L>  |  93<L>  |  18  ----------------------------<  94  4.5   |  27  |  0.57    Ca    9.5      10 Oct 2017 05:54                          9.8    8.3   )-----------( 278      ( 10 Oct 2017 05:54 )             28.8     Medications  MEDICATIONS  (STANDING):  aspirin enteric coated 81 milliGRAM(s) Oral daily  docusate sodium 100 milliGRAM(s) Oral three times a day  glycerin Suppository - Adult 1 Suppository(s) Rectal every 4 hours  lidocaine   Patch 1 Patch Transdermal daily  lidocaine   Patch 1 Patch Transdermal every 24 hours  metoprolol succinate ER 12.5 milliGRAM(s) Oral daily  mirtazapine 7.5 milliGRAM(s) Oral at bedtime  oxyCODONE  ER Tablet 10 milliGRAM(s) Oral every 8 hours  pantoprazole    Tablet 40 milliGRAM(s) Oral before breakfast  polyethylene glycol 3350 17 Gram(s) Oral at bedtime  senna 3 Tablet(s) Oral at bedtime      Physical Exam  General: Patient comfortable in bed  Vital Signs Last 12 Hrs  T(F): 97 (10-10-17 @ 18:02), Max: 98 (10-10-17 @ 14:00)  HR: 98 (10-10-17 @ 18:02) (98 - 100)  BP: --  BP(mean): 70 (10-10-17 @ 18:02) (70 - 70)  RR: 18 (10-10-17 @ 18:02) (18 - 18)  SpO2: 100% (10-10-17 @ 18:02) (100% - 100%)  Neck: No palpable thyroid nodules.  CVS: S1S2, No murmurs  Respiratory: No wheezing, no crepitations  GI: Abdomen soft, bowel sounds positive  Musculoskeletal: Positive edema lower extremities bilaterally  Skin: No skin rashes, no ecchymosis    Diagnostics    Osmolality, Random Urine: Routine  Cancel Reason: Lab Operations Cancel (10-06 @ 10:48)  Osmolality, Serum: Routine (10-06 @ 10:48)  Sodium, Random Urine: Routine (10-06 @ 10:48)

## 2017-10-10 NOTE — PROGRESS NOTE ADULT - PROBLEM SELECTOR PLAN 4
stable  at 132 behavior therapist consult appreciated   continue Remeron  encourage ambulation and support

## 2017-10-11 LAB
ANION GAP SERPL CALC-SCNC: 9 MMOL/L — SIGNIFICANT CHANGE UP (ref 5–17)
BUN SERPL-MCNC: 16 MG/DL — SIGNIFICANT CHANGE UP (ref 7–23)
CALCIUM SERPL-MCNC: 9.6 MG/DL — SIGNIFICANT CHANGE UP (ref 8.4–10.5)
CHLORIDE SERPL-SCNC: 94 MMOL/L — LOW (ref 96–108)
CO2 SERPL-SCNC: 30 MMOL/L — SIGNIFICANT CHANGE UP (ref 22–31)
CREAT SERPL-MCNC: 0.69 MG/DL — SIGNIFICANT CHANGE UP (ref 0.5–1.3)
GLUCOSE SERPL-MCNC: 97 MG/DL — SIGNIFICANT CHANGE UP (ref 70–99)
HCT VFR BLD CALC: 30.8 % — LOW (ref 39–50)
HGB BLD-MCNC: 10.3 G/DL — LOW (ref 13–17)
INR BLD: 1.89 RATIO — HIGH (ref 0.88–1.16)
MCHC RBC-ENTMCNC: 31.9 PG — SIGNIFICANT CHANGE UP (ref 27–34)
MCHC RBC-ENTMCNC: 33.4 GM/DL — SIGNIFICANT CHANGE UP (ref 32–36)
MCV RBC AUTO: 95.4 FL — SIGNIFICANT CHANGE UP (ref 80–100)
PLATELET # BLD AUTO: 256 K/UL — SIGNIFICANT CHANGE UP (ref 150–400)
POTASSIUM SERPL-MCNC: 4.3 MMOL/L — SIGNIFICANT CHANGE UP (ref 3.5–5.3)
POTASSIUM SERPL-SCNC: 4.3 MMOL/L — SIGNIFICANT CHANGE UP (ref 3.5–5.3)
PROTHROM AB SERPL-ACNC: 20.9 SEC — HIGH (ref 9.8–12.7)
RBC # BLD: 3.23 M/UL — LOW (ref 4.2–5.8)
RBC # FLD: 12.9 % — SIGNIFICANT CHANGE UP (ref 10.3–14.5)
SODIUM SERPL-SCNC: 133 MMOL/L — LOW (ref 135–145)
WBC # BLD: 10.3 K/UL — SIGNIFICANT CHANGE UP (ref 3.8–10.5)
WBC # FLD AUTO: 10.3 K/UL — SIGNIFICANT CHANGE UP (ref 3.8–10.5)

## 2017-10-11 PROCEDURE — 99233 SBSQ HOSP IP/OBS HIGH 50: CPT | Mod: 25

## 2017-10-11 PROCEDURE — 93750 INTERROGATION VAD IN PERSON: CPT

## 2017-10-11 RX ORDER — WARFARIN SODIUM 2.5 MG/1
5 TABLET ORAL ONCE
Qty: 0 | Refills: 0 | Status: COMPLETED | OUTPATIENT
Start: 2017-10-11 | End: 2017-10-11

## 2017-10-11 RX ADMIN — PANTOPRAZOLE SODIUM 40 MILLIGRAM(S): 20 TABLET, DELAYED RELEASE ORAL at 07:00

## 2017-10-11 RX ADMIN — OXYCODONE HYDROCHLORIDE 5 MILLIGRAM(S): 5 TABLET ORAL at 09:15

## 2017-10-11 RX ADMIN — OXYCODONE HYDROCHLORIDE 10 MILLIGRAM(S): 5 TABLET ORAL at 22:30

## 2017-10-11 RX ADMIN — OXYCODONE HYDROCHLORIDE 10 MILLIGRAM(S): 5 TABLET ORAL at 07:00

## 2017-10-11 RX ADMIN — MIRTAZAPINE 7.5 MILLIGRAM(S): 45 TABLET, ORALLY DISINTEGRATING ORAL at 22:01

## 2017-10-11 RX ADMIN — OXYCODONE HYDROCHLORIDE 10 MILLIGRAM(S): 5 TABLET ORAL at 16:14

## 2017-10-11 RX ADMIN — OXYCODONE HYDROCHLORIDE 10 MILLIGRAM(S): 5 TABLET ORAL at 07:02

## 2017-10-11 RX ADMIN — OXYCODONE HYDROCHLORIDE 10 MILLIGRAM(S): 5 TABLET ORAL at 16:45

## 2017-10-11 RX ADMIN — OXYCODONE HYDROCHLORIDE 10 MILLIGRAM(S): 5 TABLET ORAL at 22:00

## 2017-10-11 RX ADMIN — OXYCODONE HYDROCHLORIDE 5 MILLIGRAM(S): 5 TABLET ORAL at 08:45

## 2017-10-11 RX ADMIN — WARFARIN SODIUM 5 MILLIGRAM(S): 2.5 TABLET ORAL at 16:14

## 2017-10-11 RX ADMIN — Medication 81 MILLIGRAM(S): at 11:26

## 2017-10-11 RX ADMIN — Medication 12.5 MILLIGRAM(S): at 07:00

## 2017-10-11 RX ADMIN — Medication 100 MILLIGRAM(S): at 16:15

## 2017-10-11 NOTE — PROGRESS NOTE ADULT - ASSESSMENT
61 y/o Creole speaking male who presented with abdominal pain and was found to be in low output heart failure with cardiogenic shock (EF 10%) underwent LVAD/TV annuloplasty for INTERMACS 2 9/12; postop had bleeding requiring PRBC, amicar, PLT, cryo. TTE 9/13 LVEDD 5.9 cm, mild-mod MR, aortic valve closed, mild RV dysfunction, mild TR. Hospital course c/b significant L sided pain; found to have L hemithorax s/p pigtail that was removed; fluid c/w exudative with lot of RBC, culture negative. Continues to have pain in L flank and frustration. Currently looks euvolemic. Labs noted for stable hyponatremia with urine studies c/w likely SIADH possibly 2/2 pain.. Currently compensated, stage D HF requiring HM2 as bridge to recovery.   Pt remains frustrated by hospital course remains  withdrawn but is euvolemic, [id number] INR 1.89 , LDH stable . Pt presenting today 63 y/o Creole speaking male who presented with abdominal pain and was found to be in low output heart failure with cardiogenic shock (EF 10%) underwent LVAD/TV annuloplasty for INTERMACS 2 on 9/12. Hospital course c/b significant L sided flank pain, which is improving. Currently looks euvolemic, compensated, stage D HF requiring HM2 as destination therapy. Patient remains frustrated by hospital course remains withdrawn but is euvolemic, maps 70-80 INR 1.89, LDH stable.

## 2017-10-11 NOTE — PROGRESS NOTE ADULT - ASSESSMENT
61 y/o Creole speaking male who presented with abdominal pain and was found to be in low output heart failure with cardiogenic shock (EF 10%) s/p 9/12 LVAD/TV annuloplasty for INTERMACS 2   Postop Course:   Acute blood loss anemia 2/2 blood loss postop -->requiring PRBC, amicar, PLT, cryo.  TTE 9/13 LVEDD 5.9 cm, mild-mod MR, aortic valve closed, mild RV dysfunction, mild TR.   c/o L sided pain; found to have L hemithorax s/p pigtail that was removed; fluid c/w exudative with lot of RBC,   (+) Hyponatremia - renal consulted- urine studies sent / Endocrine following for SIADH work up  10/6 Random Urine: 49; Patient euthyroid and not adrenally insufficient -->Patient dx with SIADH, on fluid restriction.  (+) depressive mood --> psych following; Remeron initiated; started at 7.5 mg HS; avoid up titrating 2/2 risk of worsening hyponatremia    LVAD interrogated w/o events.   10/9 Small amount of rectal bleeding after bowel movement; (+) internal hemorrhoids --> Continue bowel regimen / Start Preparation H suppository TID PRN.   No further bleeding noted. pt ambulating with a walker.  Psych follow up pending  Lisinopril d/c yesterday for hypotension, stable  Pain meds increased by palliative

## 2017-10-11 NOTE — PROGRESS NOTE ADULT - SUBJECTIVE AND OBJECTIVE BOX
Patient is a 62y old  Male who presents with a chief complaint of SOOB (25 Aug 2017 22:27)  doing ok  no SOB   no cough     Any change in ROS:     MEDICATIONS  (STANDING):  aspirin enteric coated 81 milliGRAM(s) Oral daily  docusate sodium 100 milliGRAM(s) Oral three times a day  glycerin Suppository - Adult 1 Suppository(s) Rectal every 4 hours  lidocaine   Patch 1 Patch Transdermal daily  lidocaine   Patch 1 Patch Transdermal every 24 hours  metoprolol succinate ER 12.5 milliGRAM(s) Oral daily  mirtazapine 7.5 milliGRAM(s) Oral at bedtime  oxyCODONE  ER Tablet 10 milliGRAM(s) Oral every 8 hours  pantoprazole    Tablet 40 milliGRAM(s) Oral before breakfast  polyethylene glycol 3350 17 Gram(s) Oral at bedtime  senna 3 Tablet(s) Oral at bedtime    MEDICATIONS  (PRN):  naloxone Injectable 0.1 milliGRAM(s) IV Push every 3 minutes PRN Sedation and respiratory depression RR < 11  oxyCODONE    IR 5 milliGRAM(s) Oral every 4 hours PRN Mild, Moderate, Severe pain  phenylephrine 0.25% Suppository 1 Suppository(s) Rectal every 8 hours PRN Hemorrhoids    Vital Signs Last 24 Hrs  T(C): 36.7 (11 Oct 2017 07:00), Max: 36.8 (10 Oct 2017 22:00)  T(F): 98 (11 Oct 2017 07:00), Max: 98.3 (11 Oct 2017 02:51)  HR: 100 (11 Oct 2017 07:00) (98 - 103)  BP: --  BP(mean): 78 (11 Oct 2017 07:00) (70 - 78)  RR: 18 (11 Oct 2017 07:00) (18 - 18)  SpO2: 100% (11 Oct 2017 07:00) (99% - 100%)    I&O's Summary    10 Oct 2017 07:01  -  11 Oct 2017 07:00  --------------------------------------------------------  IN: 1160 mL / OUT: 1300 mL / NET: -140 mL          Physical Exam:   GENERAL: NAD, well-groomed, well-developed  HEENT: ALONSO/   Atraumatic, Normocephalic  ENMT: No tonsillar erythema, exudates, or enlargement; Moist mucous membranes, Good dentition, No lesions  NECK: Supple, No JVD, Normal thyroid  CHEST/LUNG: Clear to auscultaion, ; No rales, rhonchi, wheezing, or rubs  CVS: Regular rate and rhythm; No murmurs, rubs, or gallops  GI: : Soft, Nontender, Nondistended; Bowel sounds present  NERVOUS SYSTEM:  Alert & Oriented X3  EXTREMITIES:  2+ Peripheral Pulses, No clubbing, cyanosis, or edema  LYMPH: No lymphadenopathy noted  SKIN: No rashes or lesions  ENDOCRINOLOGY: No Thyromegaly  PSYCH: Appropriate    Labs:                              10.3   10.3  )-----------( 256      ( 11 Oct 2017 05:39 )             30.8                         9.8    8.3   )-----------( 278      ( 10 Oct 2017 05:54 )             28.8                         10.1   7.7   )-----------( 276      ( 09 Oct 2017 05:18 )             29.6                         9.9    8.0   )-----------( 264      ( 08 Oct 2017 06:08 )             29.4     10-11    133<L>  |  94<L>  |  16  ----------------------------<  97  4.3   |  30  |  0.69  10-10    132<L>  |  93<L>  |  18  ----------------------------<  94  4.5   |  27  |  0.57  10-09    132<L>  |  93<L>  |  19  ----------------------------<  144<H>  4.7   |  28  |  0.68  10-08    129<L>  |  94<L>  |  19  ----------------------------<  94  4.7   |  29  |  0.69    Ca    9.6      11 Oct 2017 05:39  Ca    9.5      10 Oct 2017 05:54      CAPILLARY BLOOD GLUCOSE            PT/INR - ( 11 Oct 2017 05:39 )   PT: 20.9 sec;   INR: 1.89 ratio             Cultures:         < from: Xray Chest 1 View AP- PORTABLE-Urgent (09.29.17 @ 16:37) >          INTERPRETATION:  AP chest with comparison to 1347 hours from the same day.    IMPRESSION: Left pigtail catheter removed.    IMPRESSION: Left-sided pigtail catheter has been removed. There is no   pneumothorax. The heart is normal in size. The patient is status post   median sternotomy. The lungs are clear.                    TIA BENAVIDES M.D., ATTENDING RADIOLOGIST  This document has been electronically signed. Sep 30 2017  4:18PM              < end of copied text >                      Studies  Chest X-RAY  CT SCAN Chest   Venous Dopplers: LE:   CT Abdomen  Others

## 2017-10-11 NOTE — PROGRESS NOTE ADULT - SUBJECTIVE AND OBJECTIVE BOX
Chief complaint  Patient is a 62y old  Male who presents with a chief complaint of SOOB (25 Aug 2017 22:27)   Review of systems  Patient in bed, comfortable, no fever, no hypoglycemia.    Labs and Fingersticks    CAPILLARY BLOOD GLUCOSE    Anion Gap, Serum: 9 (10-11 @ 05:39)  Anion Gap, Serum: 12 (10-10 @ 05:54)      Calcium, Total Serum: 9.6 (10-11 @ 05:39)  Calcium, Total Serum: 9.5 (10-10 @ 05:54)          10-11    133<L>  |  94<L>  |  16  ----------------------------<  97  4.3   |  30  |  0.69    Ca    9.6      11 Oct 2017 05:39                          10.3   10.3  )-----------( 256      ( 11 Oct 2017 05:39 )             30.8     Medications  MEDICATIONS  (STANDING):  aspirin enteric coated 81 milliGRAM(s) Oral daily  docusate sodium 100 milliGRAM(s) Oral three times a day  glycerin Suppository - Adult 1 Suppository(s) Rectal every 4 hours  lidocaine   Patch 1 Patch Transdermal daily  lidocaine   Patch 1 Patch Transdermal every 24 hours  metoprolol succinate ER 12.5 milliGRAM(s) Oral daily  mirtazapine 7.5 milliGRAM(s) Oral at bedtime  oxyCODONE  ER Tablet 10 milliGRAM(s) Oral every 8 hours  pantoprazole    Tablet 40 milliGRAM(s) Oral before breakfast  polyethylene glycol 3350 17 Gram(s) Oral at bedtime  senna 3 Tablet(s) Oral at bedtime      Physical Exam  General: Patient comfortable in bed  Vital Signs Last 12 Hrs  T(F): 97.4 (10-11-17 @ 16:00), Max: 97.4 (10-11-17 @ 11:00)  HR: 98 (10-11-17 @ 16:00) (98 - 99)  BP: --  BP(mean): 78 (10-11-17 @ 16:00) (78 - 80)  RR: 18 (10-11-17 @ 16:00) (18 - 18)  SpO2: 100% (10-11-17 @ 16:00) (100% - 100%)  Neck: No palpable thyroid nodules.  CVS: S1S2, No murmurs  Respiratory: No wheezing, no crepitations  GI: Abdomen soft, bowel sounds positive  Musculoskeletal: Positive edema lower extremities bilaterally  Skin: No skin rashes, no ecchymosis    Diagnostics    Osmolality, Random Urine: Routine  Cancel Reason: Lab Operations Cancel (10-06 @ 10:48)  Osmolality, Serum: Routine (10-06 @ 10:48)  Sodium, Random Urine: Routine (10-06 @ 10:48)

## 2017-10-11 NOTE — PROGRESS NOTE ADULT - PROBLEM SELECTOR PLAN 2
daily LDH  increase coumadin to 5mg tonight  for goal INR of 2-2.5  continue current dose of aspirin at 81 mg daily

## 2017-10-11 NOTE — PROGRESS NOTE ADULT - PROBLEM SELECTOR PLAN 1
- Continue with ASA   - Continue with lisinopril 2.5 mg QD and Toprol XL 12.5 daily per HF.  - Pain management continue with Tramadol for pain per HF  oxycontin increased to tid  - Daily LDH   - Continue with AC therapy on coumadin for a therapeutic INR goal 2-3  - Increase activity as tolerated   - D/C plan Rehab once medically cleared; plan of care discussed with attending

## 2017-10-11 NOTE — PROGRESS NOTE ADULT - SUBJECTIVE AND OBJECTIVE BOX
Interval Events: Admits to mild diffuse abdominal pain which is unchanged. Denies nausea, vomiting, having bms.    HPI:62M with no PMHx here with chest pain. Began last week, described as pleuritic, worsens with cough and deep inspiration. Has been having SOB as well, dry cough. Recently vacationed in [facility] came back today. Saw doctor in [facility] but unclear what workup was. No fever, sick contacts, abd pain. CP described as midsternal, nonradiating. Has 25 pack year smoking history.  CTPA does not reveal PE, Pulm congestion noted.     MEDICATIONS  (STANDING):  aspirin enteric coated 81 milliGRAM(s) Oral daily  docusate sodium 100 milliGRAM(s) Oral three times a day  glycerin Suppository - Adult 1 Suppository(s) Rectal every 4 hours  lidocaine   Patch 1 Patch Transdermal daily  lidocaine   Patch 1 Patch Transdermal every 24 hours  metoprolol succinate ER 12.5 milliGRAM(s) Oral daily  mirtazapine 7.5 milliGRAM(s) Oral at bedtime  oxyCODONE  ER Tablet 10 milliGRAM(s) Oral every 8 hours  pantoprazole    Tablet 40 milliGRAM(s) Oral before breakfast  polyethylene glycol 3350 17 Gram(s) Oral at bedtime  senna 3 Tablet(s) Oral at bedtime    MEDICATIONS  (PRN):  naloxone Injectable 0.1 milliGRAM(s) IV Push every 3 minutes PRN Sedation and respiratory depression RR < 11  oxyCODONE    IR 5 milliGRAM(s) Oral every 4 hours PRN Mild, Moderate, Severe pain  phenylephrine 0.25% Suppository 1 Suppository(s) Rectal every 8 hours PRN Hemorrhoids      Allergies    No Known Allergies    Intolerances        Review of Systems:    General:  No wt loss, fevers, chills, night sweats, fatigue,   Eyes:  Good vision, no reported pain  ENT:  No sore throat, pain, runny nose, dysphagia  CV:  No pain, palpitations, hypo/hypertension  Resp:  No dyspnea, cough, tachypnea, wheezing  GI:  + mild diffuse abdominal pain, No nausea, No vomiting, No diarrhea, No constipation, No weight loss, No fever, No pruritis, No rectal bleeding, No tarry stools, No dysphagia,  :  No pain, bleeding, incontinence, nocturia  Muscle:  No pain, weakness  Neuro:  No weakness, tingling, memory problems  Psych:  No fatigue, insomnia, mood problems, depression  Endocrine:  No polyuria, polydipsia, cold/heat intolerance  Heme:  No petechiae, ecchymosis, easy bruisability  Skin:  No rash, tattoos, scars, edema        Vital Signs Last 24 Hrs  T(C): 36.7 (11 Oct 2017 07:00), Max: 36.8 (10 Oct 2017 22:00)  T(F): 98 (11 Oct 2017 07:00), Max: 98.3 (11 Oct 2017 02:51)  HR: 100 (11 Oct 2017 07:00) (98 - 103)  BP: --  BP(mean): 78 (11 Oct 2017 07:00) (70 - 78)  RR: 18 (11 Oct 2017 07:00) (18 - 18)  SpO2: 100% (11 Oct 2017 07:00) (99% - 100%)    PHYSICAL EXAM:    GENERAL:  Appears stated age, well-groomed, well-nourished, no distress  HEENT:  NC/AT,  conjunctivae clear and pink, no thyromegaly, nodules, adenopathy, no JVD, sclera -anicteric  CHEST:  Full & symmetric excursion, no increased effort, breath sounds clear  HEART:  Regular rhythm, S1, S2, no murmur/rub/S3/S4, no abdominal bruit, no edema  ABDOMEN:  Soft, mild diffuse tenderness, non-distended, normoactive bowel sounds,  no masses ,no hepato-splenomegaly, no signs of chronic liver disease  EXTREMITIES:  no cyanosis, clubbing or edema  SKIN:  No rash/erythema/ecchymoses/petechiae/wounds/abscess/warm/dry  NEURO:  Alert, oriented, no asterixis, no tremor, no encephalopathy        LABS:                        10.3   10.3  )-----------( 256      ( 11 Oct 2017 05:39 )             30.8     10-11    133<L>  |  94<L>  |  16  ----------------------------<  97  4.3   |  30  |  0.69    Ca    9.6      11 Oct 2017 05:39      PT/INR - ( 11 Oct 2017 05:39 )   PT: 20.9 sec;   INR: 1.89 ratio               RADIOLOGY & ADDITIONAL TESTS:

## 2017-10-11 NOTE — PROGRESS NOTE ADULT - ATTENDING COMMENTS
Clinically stable. Ongoing VAD training and PT. Will increase warfarin to 5 mg daily given decrease in INR to 1.8.

## 2017-10-11 NOTE — PROGRESS NOTE ADULT - SUBJECTIVE AND OBJECTIVE BOX
im emanuel today    VITAL SIGNS    Telemetry:      Vital Signs Last 24 Hrs  T(C): 36.3 (10-11-17 @ 11:00), Max: 36.8 (10-10-17 @ 22:00)  T(F): 97.4 (10-11-17 @ 11:00), Max: 98.3 (10-11-17 @ 02:51)  HR: 99 (10-11-17 @ 11:00) (98 - 103)  BP: -- 80  RR: 18 (10-11-17 @ 11:00) (18 - 18)  SpO2: 100% (10-11-17 @ 11:00) (99% - 100%)                   10-10 @ 07:01  -  10-11 @ 07:00  --------------------------------------------------------  IN: 1160 mL / OUT: 1300 mL / NET: -140 mL    10-11 @ 07:01  -  10-11 @ 12:55  --------------------------------------------------------  IN: 600 mL / OUT: 0 mL / NET: 600 mL          Daily     Daily Weight in k.2 (11 Oct 2017 09:40)        CAPILLARY BLOOD GLUCOSE                      Coumadin    [x ] YES          [  ]      NO         Reason:                               PHYSICAL EXAM        Neurology: alert and oriented x 3, nonfocal, no gross deficits  CV : +hum  Sternal Wound :  CDI , Stable  Lungs: cta  Abdomen: soft, nontender, nondistended, positive bowel sounds  :           voiding  Extremities:     -edema, - calve tenderness          aspirin enteric coated 81 milliGRAM(s) Oral daily  docusate sodium 100 milliGRAM(s) Oral three times a day  glycerin Suppository - Adult 1 Suppository(s) Rectal every 4 hours  lidocaine   Patch 1 Patch Transdermal daily  lidocaine   Patch 1 Patch Transdermal every 24 hours  metoprolol succinate ER 12.5 milliGRAM(s) Oral daily  mirtazapine 7.5 milliGRAM(s) Oral at bedtime  naloxone Injectable 0.1 milliGRAM(s) IV Push every 3 minutes PRN  oxyCODONE    IR 5 milliGRAM(s) Oral every 4 hours PRN  oxyCODONE  ER Tablet 10 milliGRAM(s) Oral every 8 hours  pantoprazole    Tablet 40 milliGRAM(s) Oral before breakfast  phenylephrine 0.25% Suppository 1 Suppository(s) Rectal every 8 hours PRN  polyethylene glycol 3350 17 Gram(s) Oral at bedtime  senna 3 Tablet(s) Oral at bedtime                      Discussed with Cardiothoracic Team at AM rounds.

## 2017-10-11 NOTE — PROGRESS NOTE ADULT - SUBJECTIVE AND OBJECTIVE BOX
Subjective:    Medications:  aspirin enteric coated 81 milliGRAM(s) Oral daily  docusate sodium 100 milliGRAM(s) Oral three times a day  glycerin Suppository - Adult 1 Suppository(s) Rectal every 4 hours  lidocaine   Patch 1 Patch Transdermal daily  lidocaine   Patch 1 Patch Transdermal every 24 hours  metoprolol succinate ER 12.5 milliGRAM(s) Oral daily  mirtazapine 7.5 milliGRAM(s) Oral at bedtime  naloxone Injectable 0.1 milliGRAM(s) IV Push every 3 minutes PRN  oxyCODONE    IR 5 milliGRAM(s) Oral every 4 hours PRN  oxyCODONE  ER Tablet 10 milliGRAM(s) Oral every 8 hours  pantoprazole    Tablet 40 milliGRAM(s) Oral before breakfast  phenylephrine 0.25% Suppository 1 Suppository(s) Rectal every 8 hours PRN  polyethylene glycol 3350 17 Gram(s) Oral at bedtime  senna 3 Tablet(s) Oral at bedtime      Vital Signs Last 24 Hrs  T(C): 36.7 (11 Oct 2017 07:00), Max: 36.8 (10 Oct 2017 22:00)  T(F): 98 (11 Oct 2017 07:00), Max: 98.3 (11 Oct 2017 02:51)  HR: 100 (11 Oct 2017 07:00) (98 - 103)  BP(mean): 78 (11 Oct 2017 07:00) (70 - 78)  RR: 18 (11 Oct 2017 07:00) (18 - 18)  SpO2: 100% (11 Oct 2017 07:00) (99% - 100%)    Daily     Daily Weight in k.2 (11 Oct 2017 09:40)    I&O's Summary    10 Oct 2017 07:01  -  11 Oct 2017 07:00  --------------------------------------------------------  IN: 1160 mL / OUT: 1300 mL / NET: -140 mL        General: No distress. Comfortable.  HEENT: EOM intact.  Neck: Neck supple. JVP not elevated. No masses  Chest: Clear to auscultation bilaterally  CV: Normal S1 and S2. LVAD HUM .  Abdomen: Soft, non-distended, non-tender  Skin: No rashes or skin breakdown  Neurology: Alert and oriented times three. Sensation intact  Psych: Affect normal    LVAD Interrogation:  Pump Flow:5.2  Pump Speed:9000  Pulse Index:6.8  Pump Power:5.2   VAD Events:   Driveline evaluation: clean and dry    Programming Changes: No changes made                     10.3   10.3  )-----------( 256      ( 11 Oct 2017 05:39 )             30.8     10-11    133<L>  |  94<L>  |  16  ----------------------------<  97  4.3   |  30  |  0.69    Ca    9.6      11 Oct 2017 05:39      PT/INR - ( 11 Oct 2017 05:39 )   PT: 20.9 sec;   INR: 1.89 ratio                   Lactate Dehydrogenase, Serum: 350 U/L (10-10 @ 05:54) Subjective: No current complaints. He is walking in the halls. His abdominal pain is gradually improving. He is not dizzy.     Medications:  aspirin enteric coated 81 milliGRAM(s) Oral daily  docusate sodium 100 milliGRAM(s) Oral three times a day  glycerin Suppository - Adult 1 Suppository(s) Rectal every 4 hours  lidocaine   Patch 1 Patch Transdermal daily  lidocaine   Patch 1 Patch Transdermal every 24 hours  metoprolol succinate ER 12.5 milliGRAM(s) Oral daily  mirtazapine 7.5 milliGRAM(s) Oral at bedtime  naloxone Injectable 0.1 milliGRAM(s) IV Push every 3 minutes PRN  oxyCODONE    IR 5 milliGRAM(s) Oral every 4 hours PRN  oxyCODONE  ER Tablet 10 milliGRAM(s) Oral every 8 hours  pantoprazole    Tablet 40 milliGRAM(s) Oral before breakfast  phenylephrine 0.25% Suppository 1 Suppository(s) Rectal every 8 hours PRN  polyethylene glycol 3350 17 Gram(s) Oral at bedtime  senna 3 Tablet(s) Oral at bedtime    Vital Signs Last 24 Hrs  T(C): 36.7 (11 Oct 2017 07:00), Max: 36.8 (10 Oct 2017 22:00)  T(F): 98 (11 Oct 2017 07:00), Max: 98.3 (11 Oct 2017 02:51)  HR: 100 (11 Oct 2017 07:00) (98 - 103)  BP(mean): 78 (11 Oct 2017 07:00) (70 - 78)  RR: 18 (11 Oct 2017 07:00) (18 - 18)  SpO2: 100% (11 Oct 2017 07:00) (99% - 100%)    Daily     Daily Weight in k.2 (11 Oct 2017 09:40)    I&O's Summary    10 Oct 2017 07:01  -  11 Oct 2017 07:00  --------------------------------------------------------  IN: 1160 mL / OUT: 1300 mL / NET: -140 mL        General: No distress. Comfortable.  HEENT: EOM intact.  Neck: Neck supple. JVP not elevated. No masses  Chest: Clear to auscultation bilaterally  CV: Normal S1 and S2. LVAD HUM .  Abdomen: Soft, non-distended, non-tender  Skin: No rashes or skin breakdown  Neurology: Alert and oriented times three. Sensation intact  Psych: Affect normal    LVAD Interrogation:  Pump Flow:5.2  Pump Speed:9000  Pulse Index:6.8  Pump Power:5.2   VAD Events: None  Driveline evaluation: clean and dry    Programming Changes: No changes made                       10.3   10.3  )-----------( 256      ( 11 Oct 2017 05:39 )             30.8     10-11    133<L>  |  94<L>  |  16  ----------------------------<  97  4.3   |  30  |  0.69    Ca    9.6      11 Oct 2017 05:39      PT/INR - ( 11 Oct 2017 05:39 )   PT: 20.9 sec;   INR: 1.89 ratio      Lactate Dehydrogenase, Serum: 350 U/L (10-10 @ 05:54)

## 2017-10-12 LAB
ANION GAP SERPL CALC-SCNC: 11 MMOL/L — SIGNIFICANT CHANGE UP (ref 5–17)
BUN SERPL-MCNC: 16 MG/DL — SIGNIFICANT CHANGE UP (ref 7–23)
CALCIUM SERPL-MCNC: 9.5 MG/DL — SIGNIFICANT CHANGE UP (ref 8.4–10.5)
CHLORIDE SERPL-SCNC: 94 MMOL/L — LOW (ref 96–108)
CO2 SERPL-SCNC: 29 MMOL/L — SIGNIFICANT CHANGE UP (ref 22–31)
CREAT SERPL-MCNC: 0.73 MG/DL — SIGNIFICANT CHANGE UP (ref 0.5–1.3)
GLUCOSE SERPL-MCNC: 109 MG/DL — HIGH (ref 70–99)
HCT VFR BLD CALC: 29 % — LOW (ref 39–50)
HGB BLD-MCNC: 9.4 G/DL — LOW (ref 13–17)
INR BLD: 2.07 RATIO — HIGH (ref 0.88–1.16)
LDH SERPL L TO P-CCNC: 459 U/L — HIGH (ref 50–242)
MCHC RBC-ENTMCNC: 29.5 PG — SIGNIFICANT CHANGE UP (ref 27–34)
MCHC RBC-ENTMCNC: 32.4 GM/DL — SIGNIFICANT CHANGE UP (ref 32–36)
MCV RBC AUTO: 90.9 FL — SIGNIFICANT CHANGE UP (ref 80–100)
PLATELET # BLD AUTO: 299 K/UL — SIGNIFICANT CHANGE UP (ref 150–400)
POTASSIUM SERPL-MCNC: 4.5 MMOL/L — SIGNIFICANT CHANGE UP (ref 3.5–5.3)
POTASSIUM SERPL-SCNC: 4.5 MMOL/L — SIGNIFICANT CHANGE UP (ref 3.5–5.3)
PROTHROM AB SERPL-ACNC: 22.7 SEC — HIGH (ref 9.8–12.7)
RBC # BLD: 3.19 M/UL — LOW (ref 4.2–5.8)
RBC # FLD: 13.9 % — SIGNIFICANT CHANGE UP (ref 10.3–14.5)
SODIUM SERPL-SCNC: 134 MMOL/L — LOW (ref 135–145)
WBC # BLD: 7.83 K/UL — SIGNIFICANT CHANGE UP (ref 3.8–10.5)
WBC # FLD AUTO: 7.83 K/UL — SIGNIFICANT CHANGE UP (ref 3.8–10.5)

## 2017-10-12 PROCEDURE — 99231 SBSQ HOSP IP/OBS SF/LOW 25: CPT

## 2017-10-12 PROCEDURE — 99233 SBSQ HOSP IP/OBS HIGH 50: CPT | Mod: 25

## 2017-10-12 PROCEDURE — 93750 INTERROGATION VAD IN PERSON: CPT

## 2017-10-12 RX ORDER — WARFARIN SODIUM 2.5 MG/1
5 TABLET ORAL ONCE
Qty: 0 | Refills: 0 | Status: COMPLETED | OUTPATIENT
Start: 2017-10-12 | End: 2017-10-12

## 2017-10-12 RX ORDER — METOPROLOL TARTRATE 50 MG
25 TABLET ORAL DAILY
Qty: 0 | Refills: 0 | Status: DISCONTINUED | OUTPATIENT
Start: 2017-10-12 | End: 2017-10-17

## 2017-10-12 RX ORDER — METOPROLOL TARTRATE 50 MG
12.5 TABLET ORAL ONCE
Qty: 0 | Refills: 0 | Status: COMPLETED | OUTPATIENT
Start: 2017-10-12 | End: 2017-10-12

## 2017-10-12 RX ADMIN — OXYCODONE HYDROCHLORIDE 10 MILLIGRAM(S): 5 TABLET ORAL at 21:41

## 2017-10-12 RX ADMIN — OXYCODONE HYDROCHLORIDE 10 MILLIGRAM(S): 5 TABLET ORAL at 13:52

## 2017-10-12 RX ADMIN — OXYCODONE HYDROCHLORIDE 10 MILLIGRAM(S): 5 TABLET ORAL at 06:13

## 2017-10-12 RX ADMIN — OXYCODONE HYDROCHLORIDE 10 MILLIGRAM(S): 5 TABLET ORAL at 05:43

## 2017-10-12 RX ADMIN — OXYCODONE HYDROCHLORIDE 10 MILLIGRAM(S): 5 TABLET ORAL at 14:40

## 2017-10-12 RX ADMIN — Medication 100 MILLIGRAM(S): at 13:54

## 2017-10-12 RX ADMIN — PANTOPRAZOLE SODIUM 40 MILLIGRAM(S): 20 TABLET, DELAYED RELEASE ORAL at 05:44

## 2017-10-12 RX ADMIN — MIRTAZAPINE 7.5 MILLIGRAM(S): 45 TABLET, ORALLY DISINTEGRATING ORAL at 21:41

## 2017-10-12 RX ADMIN — Medication 100 MILLIGRAM(S): at 21:41

## 2017-10-12 RX ADMIN — Medication 81 MILLIGRAM(S): at 12:03

## 2017-10-12 RX ADMIN — WARFARIN SODIUM 5 MILLIGRAM(S): 2.5 TABLET ORAL at 21:41

## 2017-10-12 RX ADMIN — Medication 100 MILLIGRAM(S): at 05:45

## 2017-10-12 RX ADMIN — OXYCODONE HYDROCHLORIDE 10 MILLIGRAM(S): 5 TABLET ORAL at 22:15

## 2017-10-12 RX ADMIN — OXYCODONE HYDROCHLORIDE 5 MILLIGRAM(S): 5 TABLET ORAL at 08:42

## 2017-10-12 RX ADMIN — SENNA PLUS 3 TABLET(S): 8.6 TABLET ORAL at 21:42

## 2017-10-12 RX ADMIN — Medication 12.5 MILLIGRAM(S): at 05:43

## 2017-10-12 RX ADMIN — OXYCODONE HYDROCHLORIDE 5 MILLIGRAM(S): 5 TABLET ORAL at 09:30

## 2017-10-12 RX ADMIN — Medication 12.5 MILLIGRAM(S): at 13:53

## 2017-10-12 NOTE — PROGRESS NOTE ADULT - ASSESSMENT
63 y/o Creole speaking male who presented with abdominal pain and was found to be in low output heart failure with cardiogenic shock (EF 10%) underwent LVAD/TV annuloplasty for INTERMACS 2 on 9/12. Hospital course c/b significant L sided flank pain, which is improving. Currently looks euvolemic, compensated, stage D HF requiring HM2 as destination therapy. Patient remains frustrated by hospital course remains withdrawn but is euvolemic ,, INR 2.0 on higher dose coumadin ,[id number] 61 y/o Creole speaking male who presented with abdominal pain and was found to be in low output heart failure with cardiogenic shock (EF 10%) underwent LVAD/TV annuloplasty for INTERMACS 2 on 9/12. Hospital course c/b significant L sided flank pain, which is improving. Currently looks euvolemic, compensated, stage D HF requiring HM2 as destination therapy. Patient remains frustrated by hospital course verbal today  is euvolemic ,, INR 2.0 on higher dose coumadin ,[id number] . 61 y/o Creole speaking male who presented with abdominal pain and was found to be in low output heart failure with cardiogenic shock (EF 10%) underwent LVAD/TV annuloplasty for INTERMACS 2 on 9/12. Hospital course c/b significant L sided flank pain, which is improving. Currently looks euvolemic, compensated, stage D HF requiring HM2 as destination therapy. Patient remains frustrated by hospital course. He is euvolemic ,, INR 2.0 on higher dose coumadin ,[id number] .

## 2017-10-12 NOTE — PROGRESS NOTE ADULT - ASSESSMENT
Spoke with patient via translation phone.   More verbal talking via phone.  Expressed frustration and anger with hospital routines and food.   Feels staff “treat me like I’m a baby.”  Provided validation and support while patient discussed multiple annoyances.   At end of discussion was more calm and went for a walk on unit.  Family in [place] until 10/22/17.  Had visitors today.  Appetite poor.  Does not like hospital food.  Endorsing low mood, frustration.   Affect irritable.  Sleep improved.  Taking Remeron 7.5mg at bedtime.       Dx: Adjustment disorder with depression     Recommendations:  Support and validation   Will benefit from additional coping strategies to manage current stressors   Behavioral Cardiology will continue to follow

## 2017-10-12 NOTE — PROGRESS NOTE ADULT - SUBJECTIVE AND OBJECTIVE BOX
Behavioral Cardiology Progress Note     HPI: Mr. Quispe is a 62 year old man with a nonischemic cardiomyopathy with ACC/AHA stage D congestive heart failure who underwent LVAD/TV annuloplasty for INTERMACS 2 on 9/12/17.     His postoperative course was complicated by bleeding requiring transfusions, which has stabilized.      Behavioral Health Assessment:     Mood:  frustrated and irritable          Current stressors:   Adjustment to LVAD   Hospitalization      Support system/family support: Family support (family members -sister and her family) are currently in [facility] until 10/22/17.  Pt’s niece is an RN at [employer].       Coping strategies:  Limited; reports he tends to keep things to himself, reluctant to ask others for help.        Understanding of medical illness and treatment plan: Continues with process of LVAD education with LVAD coordinator.        MSE: Seen sitting in chair. A&Ox3.  Poorly related with minimal eye contact, kept his eyes closed at times. Thought process goal directed.  No abnormal thought content, denies s/i.  Mood: dysphoric.  Affect irritable. Insight and judgment adequate.

## 2017-10-12 NOTE — PROGRESS NOTE ADULT - SUBJECTIVE AND OBJECTIVE BOX
Interval Events: Pt denies abdominal pain, tolerating PO.    HPI:62M with no PMHx here with chest pain. Began last week, described as pleuritic, worsens with cough and deep inspiration. Has been having SOB as well, dry cough. Recently vacationed in [facility] came back today. Saw doctor in [facility] but unclear what workup was. No fever, sick contacts, abd pain. CP described as midsternal, nonradiating. Has 25 pack year smoking history.  CTPA does not reveal PE, Pulm congestion noted.     MEDICATIONS  (STANDING):  aspirin enteric coated 81 milliGRAM(s) Oral daily  docusate sodium 100 milliGRAM(s) Oral three times a day  glycerin Suppository - Adult 1 Suppository(s) Rectal every 4 hours  lidocaine   Patch 1 Patch Transdermal daily  lidocaine   Patch 1 Patch Transdermal every 24 hours  metoprolol succinate ER 12.5 milliGRAM(s) Oral daily  mirtazapine 7.5 milliGRAM(s) Oral at bedtime  oxyCODONE  ER Tablet 10 milliGRAM(s) Oral every 8 hours  pantoprazole    Tablet 40 milliGRAM(s) Oral before breakfast  polyethylene glycol 3350 17 Gram(s) Oral at bedtime  senna 3 Tablet(s) Oral at bedtime  warfarin 5 milliGRAM(s) Oral once    MEDICATIONS  (PRN):  naloxone Injectable 0.1 milliGRAM(s) IV Push every 3 minutes PRN Sedation and respiratory depression RR < 11  oxyCODONE    IR 5 milliGRAM(s) Oral every 4 hours PRN Mild, Moderate, Severe pain  phenylephrine 0.25% Suppository 1 Suppository(s) Rectal every 8 hours PRN Hemorrhoids      Allergies    No Known Allergies    Intolerances        Review of Systems:    General:  No wt loss, fevers, chills, night sweats, fatigue,   Eyes:  Good vision, no reported pain  ENT:  No sore throat, pain, runny nose, dysphagia  CV:  No pain, palpitations, hypo/hypertension  Resp:  No dyspnea, cough, tachypnea, wheezing  GI:  No abdominal pain, No nausea, No vomiting, No diarrhea, No constipation, No weight loss, No fever, No pruritis, No rectal bleeding, No tarry stools, No dysphagia,  :  No pain, bleeding, incontinence, nocturia  Muscle:  No pain, weakness  Neuro:  No weakness, tingling, memory problems  Psych:  No fatigue, insomnia, mood problems, depression  Endocrine:  No polyuria, polydipsia, cold/heat intolerance  Heme:  No petechiae, ecchymosis, easy bruisability  Skin:  No rash, tattoos, scars, edema        Vital Signs Last 24 Hrs  T(C): 36.6 (12 Oct 2017 08:00), Max: 36.9 (12 Oct 2017 05:00)  T(F): 97.8 (12 Oct 2017 08:00), Max: 98.4 (12 Oct 2017 05:00)  HR: 100 (12 Oct 2017 08:00) (92 - 100)  BP: --  BP(mean): 80 (12 Oct 2017 08:00) (74 - 80)  RR: 18 (12 Oct 2017 08:00) (18 - 18)  SpO2: 99% (12 Oct 2017 08:00) (99% - 100%)    PHYSICAL EXAM:    GENERAL:  Appears stated age, well-groomed, well-nourished, no distress  HEENT:  NC/AT,  conjunctivae clear and pink, no thyromegaly, nodules, adenopathy, no JVD, sclera -anicteric  CHEST:  Full & symmetric excursion, no increased effort, breath sounds clear  HEART:  Regular rhythm, S1, S2, no murmur/rub/S3/S4, no abdominal bruit, no edema  ABDOMEN:  Soft, NT, non-distended, normoactive bowel sounds,  no masses ,no hepato-splenomegaly, no signs of chronic liver disease  EXTREMITIES:  no cyanosis, clubbing or edema  SKIN:  No rash/erythema/ecchymoses/petechiae/wounds/abscess/warm/dry  NEURO:  Alert, oriented, no asterixis, no tremor, no encephalopathy  LABS:                        9.4    7.83  )-----------( 299      ( 12 Oct 2017 07:14 )             29.0     10-12    134<L>  |  94<L>  |  16  ----------------------------<  109<H>  4.5   |  29  |  0.73    Ca    9.5      12 Oct 2017 05:51      PT/INR - ( 12 Oct 2017 05:51 )   PT: 22.7 sec;   INR: 2.07 ratio               RADIOLOGY & ADDITIONAL TESTS:

## 2017-10-12 NOTE — PROGRESS NOTE ADULT - PROBLEM SELECTOR PLAN 2
On coumadin- goal INR of 2-2.5  continue current dose of aspirin at 81 mg daily  management as per Cardiology

## 2017-10-12 NOTE — PROGRESS NOTE ADULT - PROBLEM SELECTOR PLAN 2
daily LDH  coumadin dose 5 mg for goal INR of 2-2.5  continue current dose of aspirin at 81 mg daily

## 2017-10-12 NOTE — PROGRESS NOTE ADULT - PROBLEM SELECTOR PLAN 1
- Continue with ASA     - Daily LDH   - Continue with AC therapy on coumadin for a therapeutic INR goal 2-3  - Increase activity as tolerated   - D/C plan Rehab once medically cleared; plan of care discussed with attending  hopeful  moday

## 2017-10-12 NOTE — PROGRESS NOTE ADULT - ASSESSMENT
63 y/o Creole speaking male  presented with abdominal pain and  found to be in low output heart failure with cardiogenic shock (EF 10%) s/p 9/12 LVAD/TV annuloplasty for INTERMACS 2   Postop Course:   Acute blood loss anemia 2/2 blood loss postop -->requiring PRBC, amicar, PLT, cryo.  TTE 9/13 LVEDD 5.9 cm, mild-mod MR, aortic valve closed, mild RV dysfunction, mild TR.   c/o L sided pain; found to have L hemithorax s/p pigtail that was removed; fluid c/w exudative with lot of RBC,   (+) Hyponatremia - renal consulted- urine studies sent / Endocrine following for SIADH work up  10/6 Random Urine: 49; Patient euthyroid and not adrenally insufficient -->Patient dx with SIADH, on fluid restriction.  (+) depressive mood --> psych following; Remeron initiated; started at 7.5 mg HS; avoid up titrating 2/2 risk of worsening hyponatremia    LVAD interrogated w/o events.   10/9 Small amount of rectal bleeding after bowel movement; (+) internal hemorrhoids --> Continue bowel regimen / Start Preparation H suppository TID PRN.   No further bleeding noted. pt ambulating with a walker.  Psych follow up pending  Lisinopril d/c yesterday for hypotension, stable  Pain meds increased by palliative  10/12  coumadin dosing, na improving 134, ENDO for siadh, old ivl out refusing new ivl

## 2017-10-12 NOTE — PROGRESS NOTE ADULT - PROBLEM SELECTOR PLAN 2
- Continue with Toprol XL 12.5 daily per HF.  - Pain management continue with Tramadol for pain per HF  - Daily LDH   - Continue with AC therapy on coumadin for a therapeutic INR goal 2-3    - D/C plan Rehab once medically cleared; plan of care discussed with attending

## 2017-10-12 NOTE — PROGRESS NOTE ADULT - SUBJECTIVE AND OBJECTIVE BOX
Chief complaint  Patient is a 62y old  Male who presents with a chief complaint of SOOB (25 Aug 2017 22:27)   Review of systems  Patient in bed, comfortable, no fever, no hypoglycemia.    Labs and Fingersticks    CAPILLARY BLOOD GLUCOSE    Anion Gap, Serum: 11 (10-12 @ 05:51)  Anion Gap, Serum: 9 (10-11 @ 05:39)      Calcium, Total Serum: 9.5 (10-12 @ 05:51)  Calcium, Total Serum: 9.6 (10-11 @ 05:39)          10-12    134<L>  |  94<L>  |  16  ----------------------------<  109<H>  4.5   |  29  |  0.73    Ca    9.5      12 Oct 2017 05:51                          9.4    7.83  )-----------( 299      ( 12 Oct 2017 07:14 )             29.0     Medications  MEDICATIONS  (STANDING):  aspirin enteric coated 81 milliGRAM(s) Oral daily  docusate sodium 100 milliGRAM(s) Oral three times a day  glycerin Suppository - Adult 1 Suppository(s) Rectal every 4 hours  lidocaine   Patch 1 Patch Transdermal daily  lidocaine   Patch 1 Patch Transdermal every 24 hours  metoprolol succinate ER 25 milliGRAM(s) Oral daily  mirtazapine 7.5 milliGRAM(s) Oral at bedtime  oxyCODONE  ER Tablet 10 milliGRAM(s) Oral every 8 hours  pantoprazole    Tablet 40 milliGRAM(s) Oral before breakfast  polyethylene glycol 3350 17 Gram(s) Oral at bedtime  senna 3 Tablet(s) Oral at bedtime  warfarin 5 milliGRAM(s) Oral once      Physical Exam  General: Patient comfortable in bed  Vital Signs Last 12 Hrs  T(F): 98.4 (10-12-17 @ 17:00), Max: 98.4 (10-12-17 @ 17:00)  HR: 98 (10-12-17 @ 17:00) (98 - 100)  BP: --  BP(mean): 86 (10-12-17 @ 17:00) (80 - 86)  RR: 18 (10-12-17 @ 17:00) (18 - 18)  SpO2: 95% (10-12-17 @ 17:00) (95% - 97%)  Neck: No palpable thyroid nodules.  CVS: S1S2, No murmurs  Respiratory: No wheezing, no crepitations  GI: Abdomen soft, bowel sounds positive  Musculoskeletal: Positive edema lower extremities bilaterally  Skin: No skin rashes, no ecchymosis    Diagnostics    Osmolality, Random Urine: Routine  Cancel Reason: Lab Operations Cancel (10-06 @ 10:48)  Osmolality, Serum: Routine (10-06 @ 10:48)  Sodium, Random Urine: Routine (10-06 @ 10:48)

## 2017-10-12 NOTE — PROGRESS NOTE ADULT - SUBJECTIVE AND OBJECTIVE BOX
VITAL SIGNS    Telemetry:  VSR 90    Vital Signs Last 24 Hrs  T(C): 36.6 (10-12-17 @ 08:00), Max: 36.9 (10-12-17 @ 05:00)  T(F): 97.8 (10-12-17 @ 08:00), Max: 98.4 (10-12-17 @ 05:00)  HR: 100 (10-12-17 @ 08:00) (92 - 100)  BP: --  RR: 18 (10-12-17 @ 08:00) (18 - 18)  SpO2: 99% (10-12-17 @ 08:00) (99% - 100%)               Coumadin    [x ] YES                           PHYSICAL EXAM    Neurology: alert and oriented x 3, moves all extremities with no defecits, agitated  CV :  RRR  lvad sounds  Sternal Wound :  CDI , Stable  intact driveline  Lungs:   CTA B/L  Abdomen: soft, nontender, nondistended, positive bowel sounds, last bowel movement   10/11  Extremities:       no edema  no calf tenderness

## 2017-10-12 NOTE — PROGRESS NOTE ADULT - PROBLEM SELECTOR PLAN 1
ongoing education and support ongoing education and support  d/c to [facility] when stable   increase toprol to 25 mg daily ongoing education and support  increase toprol to 25 mg daily

## 2017-10-12 NOTE — PROGRESS NOTE ADULT - SUBJECTIVE AND OBJECTIVE BOX
Subjective:    Medications:  aspirin enteric coated 81 milliGRAM(s) Oral daily  docusate sodium 100 milliGRAM(s) Oral three times a day  glycerin Suppository - Adult 1 Suppository(s) Rectal every 4 hours  lidocaine   Patch 1 Patch Transdermal daily  lidocaine   Patch 1 Patch Transdermal every 24 hours  metoprolol succinate ER 12.5 milliGRAM(s) Oral daily  mirtazapine 7.5 milliGRAM(s) Oral at bedtime  naloxone Injectable 0.1 milliGRAM(s) IV Push every 3 minutes PRN  oxyCODONE    IR 5 milliGRAM(s) Oral every 4 hours PRN  oxyCODONE  ER Tablet 10 milliGRAM(s) Oral every 8 hours  pantoprazole    Tablet 40 milliGRAM(s) Oral before breakfast  phenylephrine 0.25% Suppository 1 Suppository(s) Rectal every 8 hours PRN  polyethylene glycol 3350 17 Gram(s) Oral at bedtime  senna 3 Tablet(s) Oral at bedtime        Vital Signs Last 24 Hrs  T(C): 36.9 (12 Oct 2017 05:00), Max: 36.9 (12 Oct 2017 05:00)  T(F): 98.4 (12 Oct 2017 05:00), Max: 98.4 (12 Oct 2017 05:00)  HR: 92 (12 Oct 2017 05:00) (92 - 99)  BP(mean): 74 (12 Oct 2017 05:00) (74 - 80)  RR: 18 (12 Oct 2017 05:00) (18 - 18)  SpO2: 99% (12 Oct 2017 05:00) (99% - 100%)      Daily Weight in k.2 (11 Oct 2017 09:40)    I&O's Summary    11 Oct 2017 07:01  -  12 Oct 2017 07:00  --------------------------------------------------------  IN: 960 mL / OUT: 700 mL / NET: 260 mL        General: No distress. Comfortable.  HEENT: EOM intact.  Neck: Neck supple. JVP not elevated. No masses  Chest: Clear to auscultation bilaterally  CV: Normal S1 and S2. LVAD HUM  Abdomen: Soft, non-distended, non-tender  Skin: No rashes or skin breakdown  Neurology: Alert and oriented times three. Sensation intact  Psych: Affect normal    Labs:                        10.3   10.3  )-----------( 256      ( 11 Oct 2017 05:39 )             30.8     10-12    134<L>  |  94<L>  |  16  ----------------------------<  109<H>  4.5   |  29  |  0.73    Ca    9.5      12 Oct 2017 05:51      PT/INR - ( 12 Oct 2017 05:51 )   PT: 22.7 sec;   INR: 2.07 ratio         Lactate Dehydrogenase, Serum: 350 U/L (10-10 @ 05:54) Subjective:    Medications:  aspirin enteric coated 81 milliGRAM(s) Oral daily  docusate sodium 100 milliGRAM(s) Oral three times a day  glycerin Suppository - Adult 1 Suppository(s) Rectal every 4 hours  lidocaine   Patch 1 Patch Transdermal daily  lidocaine   Patch 1 Patch Transdermal every 24 hours  metoprolol succinate ER 12.5 milliGRAM(s) Oral daily  mirtazapine 7.5 milliGRAM(s) Oral at bedtime  naloxone Injectable 0.1 milliGRAM(s) IV Push every 3 minutes PRN  oxyCODONE    IR 5 milliGRAM(s) Oral every 4 hours PRN  oxyCODONE  ER Tablet 10 milliGRAM(s) Oral every 8 hours  pantoprazole    Tablet 40 milliGRAM(s) Oral before breakfast  phenylephrine 0.25% Suppository 1 Suppository(s) Rectal every 8 hours PRN  polyethylene glycol 3350 17 Gram(s) Oral at bedtime  senna 3 Tablet(s) Oral at bedtime        Vital Signs Last 24 Hrs  T(C): 36.9 (12 Oct 2017 05:00), Max: 36.9 (12 Oct 2017 05:00)  T(F): 98.4 (12 Oct 2017 05:00), Max: 98.4 (12 Oct 2017 05:00)  HR: 92 (12 Oct 2017 05:00) (92 - 99)  BP(mean): 74 (12 Oct 2017 05:00) (74 - 80)  RR: 18 (12 Oct 2017 05:00) (18 - 18)  SpO2: 99% (12 Oct 2017 05:00) (99% - 100%)      Daily Weight in k.2 (11 Oct 2017 09:40)    I&O's Summary    11 Oct 2017 07:01  -  12 Oct 2017 07:00  --------------------------------------------------------  IN: 960 mL / OUT: 700 mL / NET: 260 mL        General: No distress. Comfortable.  HEENT: EOM intact.  Neck: Neck supple. JVP not elevated. No masses  Chest: Clear to auscultation bilaterally  CV: Normal S1 and S2. LVAD HUM  Abdomen: Soft, non-distended, non-tender  Skin: No rashes or skin breakdown  Neurology: Alert and oriented times three. Sensation intact  Psych: Affect normal    LVAD Interrogation:  Pump Flow:5.1  Pump Speed:9000  Pulse Index:6.3  Pump Power:5.4  VAD Events:   Driveline evaluation:    Programming Changes:  No changes made                        10.3   10.3  )-----------( 256      ( 11 Oct 2017 05:39 )             30.8     10-12    134<L>  |  94<L>  |  16  ----------------------------<  109<H>  4.5   |  29  |  0.73    Ca    9.5      12 Oct 2017 05:51      PT/INR - ( 12 Oct 2017 05:51 )   PT: 22.7 sec;   INR: 2.07 ratio         Lactate Dehydrogenase, Serum: 350 U/L (10-10 @ 05:54) Subjective: pt sitting in chair extremely  upset/ frustrated  and verbal about hospital routine     Medications:  aspirin enteric coated 81 milliGRAM(s) Oral daily  docusate sodium 100 milliGRAM(s) Oral three times a day  glycerin Suppository - Adult 1 Suppository(s) Rectal every 4 hours  lidocaine   Patch 1 Patch Transdermal daily  lidocaine   Patch 1 Patch Transdermal every 24 hours  metoprolol succinate ER 12.5 milliGRAM(s) Oral daily  mirtazapine 7.5 milliGRAM(s) Oral at bedtime  naloxone Injectable 0.1 milliGRAM(s) IV Push every 3 minutes PRN  oxyCODONE    IR 5 milliGRAM(s) Oral every 4 hours PRN  oxyCODONE  ER Tablet 10 milliGRAM(s) Oral every 8 hours  pantoprazole    Tablet 40 milliGRAM(s) Oral before breakfast  phenylephrine 0.25% Suppository 1 Suppository(s) Rectal every 8 hours PRN  polyethylene glycol 3350 17 Gram(s) Oral at bedtime  senna 3 Tablet(s) Oral at bedtime    Vital Signs Last 24 Hrs  T(C): 36.9 (12 Oct 2017 05:00), Max: 36.9 (12 Oct 2017 05:00)  T(F): 98.4 (12 Oct 2017 05:00), Max: 98.4 (12 Oct 2017 05:00)  HR: 92 (12 Oct 2017 05:00) (92 - 99)  BP(mean): 74 (12 Oct 2017 05:00) (74 - 80)  RR: 18 (12 Oct 2017 05:00) (18 - 18)  SpO2: 99% (12 Oct 2017 05:00) (99% - 100%)      Daily Weight in k.2 (11 Oct 2017 09:40)    I&O's Summary    11 Oct 2017 07:01  -  12 Oct 2017 07:00  --------------------------------------------------------  IN: 960 mL / OUT: 700 mL / NET: 260 mL        General: No distress. Comfortable.  HEENT: EOM intact.  Neck: Neck supple. JVP not elevated. No masses  Chest: Clear to auscultation bilaterally  CV: Normal S1 and S2. LVAD HUM  Abdomen: Soft, non-distended, non-tender  Skin: No rashes or skin breakdown  Neurology: Alert and oriented times three. Sensation intact  Psych: Affect normal    LVAD Interrogation:  Pump Flow:5.1  Pump Speed:9000  Pulse Index:6.3  Pump Power:5.4  VAD Events: no   Driveline evaluation:  clean and dry   Programming Changes:  No changes made                        10.3   10.3  )-----------( 256      ( 11 Oct 2017 05:39 )             30.8     10-12    134<L>  |  94<L>  |  16  ----------------------------<  109<H>  4.5   |  29  |  0.73    Ca    9.5      12 Oct 2017 05:51      PT/INR - ( 12 Oct 2017 05:51 )   PT: 22.7 sec;   INR: 2.07 ratio         Lactate Dehydrogenase, Serum: 350 U/L (10-10 @ 05:54)

## 2017-10-13 LAB
ANION GAP SERPL CALC-SCNC: 12 MMOL/L — SIGNIFICANT CHANGE UP (ref 5–17)
APTT BLD: 31.5 SEC — SIGNIFICANT CHANGE UP (ref 27.5–37.4)
BUN SERPL-MCNC: 15 MG/DL — SIGNIFICANT CHANGE UP (ref 7–23)
CALCIUM SERPL-MCNC: 9.2 MG/DL — SIGNIFICANT CHANGE UP (ref 8.4–10.5)
CHLORIDE SERPL-SCNC: 96 MMOL/L — SIGNIFICANT CHANGE UP (ref 96–108)
CO2 SERPL-SCNC: 27 MMOL/L — SIGNIFICANT CHANGE UP (ref 22–31)
CREAT SERPL-MCNC: 0.64 MG/DL — SIGNIFICANT CHANGE UP (ref 0.5–1.3)
GLUCOSE SERPL-MCNC: 98 MG/DL — SIGNIFICANT CHANGE UP (ref 70–99)
HCT VFR BLD CALC: 26.9 % — LOW (ref 39–50)
HGB BLD-MCNC: 9.4 G/DL — LOW (ref 13–17)
INR BLD: 2.08 RATIO — HIGH (ref 0.88–1.16)
LDH SERPL L TO P-CCNC: 329 U/L — HIGH (ref 50–242)
MCHC RBC-ENTMCNC: 33.3 PG — SIGNIFICANT CHANGE UP (ref 27–34)
MCHC RBC-ENTMCNC: 34.9 GM/DL — SIGNIFICANT CHANGE UP (ref 32–36)
MCV RBC AUTO: 95.3 FL — SIGNIFICANT CHANGE UP (ref 80–100)
PLATELET # BLD AUTO: 241 K/UL — SIGNIFICANT CHANGE UP (ref 150–400)
POTASSIUM SERPL-MCNC: 4.2 MMOL/L — SIGNIFICANT CHANGE UP (ref 3.5–5.3)
POTASSIUM SERPL-SCNC: 4.2 MMOL/L — SIGNIFICANT CHANGE UP (ref 3.5–5.3)
PROTHROM AB SERPL-ACNC: 23 SEC — HIGH (ref 9.8–12.7)
RBC # BLD: 2.82 M/UL — LOW (ref 4.2–5.8)
RBC # FLD: 13 % — SIGNIFICANT CHANGE UP (ref 10.3–14.5)
SODIUM SERPL-SCNC: 135 MMOL/L — SIGNIFICANT CHANGE UP (ref 135–145)
WBC # BLD: 7.8 K/UL — SIGNIFICANT CHANGE UP (ref 3.8–10.5)
WBC # FLD AUTO: 7.8 K/UL — SIGNIFICANT CHANGE UP (ref 3.8–10.5)

## 2017-10-13 PROCEDURE — 99233 SBSQ HOSP IP/OBS HIGH 50: CPT

## 2017-10-13 PROCEDURE — 99233 SBSQ HOSP IP/OBS HIGH 50: CPT | Mod: 25

## 2017-10-13 PROCEDURE — 93750 INTERROGATION VAD IN PERSON: CPT

## 2017-10-13 RX ORDER — LISINOPRIL 2.5 MG/1
2.5 TABLET ORAL DAILY
Qty: 0 | Refills: 0 | Status: DISCONTINUED | OUTPATIENT
Start: 2017-10-13 | End: 2017-10-14

## 2017-10-13 RX ORDER — WARFARIN SODIUM 2.5 MG/1
5 TABLET ORAL ONCE
Qty: 0 | Refills: 0 | Status: COMPLETED | OUTPATIENT
Start: 2017-10-13 | End: 2017-10-13

## 2017-10-13 RX ADMIN — OXYCODONE HYDROCHLORIDE 10 MILLIGRAM(S): 5 TABLET ORAL at 22:02

## 2017-10-13 RX ADMIN — WARFARIN SODIUM 5 MILLIGRAM(S): 2.5 TABLET ORAL at 22:02

## 2017-10-13 RX ADMIN — Medication 100 MILLIGRAM(S): at 05:35

## 2017-10-13 RX ADMIN — OXYCODONE HYDROCHLORIDE 10 MILLIGRAM(S): 5 TABLET ORAL at 05:35

## 2017-10-13 RX ADMIN — Medication 25 MILLIGRAM(S): at 05:35

## 2017-10-13 RX ADMIN — OXYCODONE HYDROCHLORIDE 10 MILLIGRAM(S): 5 TABLET ORAL at 06:05

## 2017-10-13 RX ADMIN — LISINOPRIL 2.5 MILLIGRAM(S): 2.5 TABLET ORAL at 22:03

## 2017-10-13 RX ADMIN — MIRTAZAPINE 7.5 MILLIGRAM(S): 45 TABLET, ORALLY DISINTEGRATING ORAL at 22:02

## 2017-10-13 RX ADMIN — Medication 100 MILLIGRAM(S): at 13:12

## 2017-10-13 RX ADMIN — OXYCODONE HYDROCHLORIDE 10 MILLIGRAM(S): 5 TABLET ORAL at 13:45

## 2017-10-13 RX ADMIN — OXYCODONE HYDROCHLORIDE 10 MILLIGRAM(S): 5 TABLET ORAL at 13:15

## 2017-10-13 RX ADMIN — Medication 81 MILLIGRAM(S): at 13:12

## 2017-10-13 RX ADMIN — OXYCODONE HYDROCHLORIDE 10 MILLIGRAM(S): 5 TABLET ORAL at 22:32

## 2017-10-13 RX ADMIN — PANTOPRAZOLE SODIUM 40 MILLIGRAM(S): 20 TABLET, DELAYED RELEASE ORAL at 05:36

## 2017-10-13 NOTE — PROGRESS NOTE ADULT - PROBLEM SELECTOR PLAN 5
Patient is full code. Patient remains frustrated about not going home and having to go to rehab; not participating in extended discussion. Pain is better controlled. Patient is not open to further discussions; closes his eyes and does not proceed with conversation. Support provided.

## 2017-10-13 NOTE — PROGRESS NOTE ADULT - ASSESSMENT
63 y/o Creole speaking male who presented with abdominal pain and was found to be in low output heart failure with cardiogenic shock (EF 10%) underwent LVAD/TV annuloplasty for INTERMACS 2 on 9/12. Hospital course complicated by left sided flank pain, which is improving. Currently euvolemic and normotensive with MAP at goal. Discharge has been complicated by lack of rehab options, which we are investigating.    Discussed with [facility] NP team. Please call me with questions at [phone].

## 2017-10-13 NOTE — PROGRESS NOTE ADULT - ASSESSMENT
Assessment  Hyponatremia: Patient is euthyroid and not adrenally insufficient, has SIADH, on fluid restriction, hyponatremia improved,  FU   CHF: continue treatment, cardiology team FU

## 2017-10-13 NOTE — PROGRESS NOTE ADULT - SUBJECTIVE AND OBJECTIVE BOX
Chief complaint  Patient is a 62y old  Male who presents with a chief complaint of SOOB (25 Aug 2017 22:27)   Review of systems  Patient in bed, comfortable, no fever, no hypoglycemia.    Labs and Fingersticks    CAPILLARY BLOOD GLUCOSE    Anion Gap, Serum: 12 (10-13 @ 05:19)  Anion Gap, Serum: 11 (10-12 @ 05:51)      Calcium, Total Serum: 9.2 (10-13 @ 05:19)  Calcium, Total Serum: 9.5 (10-12 @ 05:51)          10-13    135  |  96  |  15  ----------------------------<  98  4.2   |  27  |  0.64    Ca    9.2      13 Oct 2017 05:19                          9.4    7.8   )-----------( 241      ( 13 Oct 2017 05:19 )             26.9     Medications  MEDICATIONS  (STANDING):  aspirin enteric coated 81 milliGRAM(s) Oral daily  docusate sodium 100 milliGRAM(s) Oral three times a day  glycerin Suppository - Adult 1 Suppository(s) Rectal every 4 hours  lidocaine   Patch 1 Patch Transdermal daily  lidocaine   Patch 1 Patch Transdermal every 24 hours  lisinopril 2.5 milliGRAM(s) Oral daily  metoprolol succinate ER 25 milliGRAM(s) Oral daily  mirtazapine 7.5 milliGRAM(s) Oral at bedtime  oxyCODONE  ER Tablet 10 milliGRAM(s) Oral every 8 hours  pantoprazole    Tablet 40 milliGRAM(s) Oral before breakfast  polyethylene glycol 3350 17 Gram(s) Oral at bedtime  senna 3 Tablet(s) Oral at bedtime  warfarin 5 milliGRAM(s) Oral once      Physical Exam  General: Patient comfortable in bed  Vital Signs Last 12 Hrs  T(F): 97.7 (10-13-17 @ 09:00), Max: 98.1 (10-13-17 @ 05:00)  HR: 97 (10-13-17 @ 09:00) (97 - 97)  BP: --  BP(mean): 70 (10-13-17 @ 09:00) (70 - 78)  RR: 18 (10-13-17 @ 09:00) (18 - 18)  SpO2: 99% (10-13-17 @ 09:00) (99% - 99%)  Neck: No palpable thyroid nodules.  CVS: S1S2, No murmurs  Respiratory: No wheezing, no crepitations  GI: Abdomen soft, bowel sounds positive  Musculoskeletal: Positive edema lower extremities bilaterally  Skin: No skin rashes, no ecchymosis    Diagnostics    Osmolality, Random Urine: Routine  Cancel Reason: Lab Operations Cancel (10-06 @ 10:48)  Osmolality, Serum: Routine (10-06 @ 10:48)  Sodium, Random Urine: Routine (10-06 @ 10:48)

## 2017-10-13 NOTE — PROGRESS NOTE ADULT - PROBLEM SELECTOR PLAN 2
- Continue with Toprol XL 25 daily per HF. added   Lisinopril  - Continue with AC therapy on coumadin for a therapeutic INR goal 2-3

## 2017-10-13 NOTE — CHART NOTE - NSCHARTNOTEFT_GEN_A_CORE
Source: Patient [ X]    Family [ ]     other [ X] RN, medical team.     Pt seen for malnutrition follow up. Pt reports an improving PO intake. States he consumes 2 ensure enlive yesterday and had 2 eggs 1 banana and cereal for breakfast today. Pt states he wants a wider variety of food options, RD reviewed options and food preferences. Pt was minimally amenable to reviewed HF nutrition therapy as well as coumadin diet education. Pt is able to use general teach back points which encouragement, Pt requires on going reinforcement.     Per chart     Diet :       Patient reports [ ] nausea  [ ] vomiting [ ] diarrhea [ ] constipation  [ ]chewing problems [ ] swallowing issues  [ ] other:      PO intake:  < 50% [ ] 50-75% [ ]   % [ ]  other :     Source for PO intake [ ] Patient [ ] family [ ] chart [ ] staff [ ] other     Enteral /Parenteral Nutrition:       Current Weight: Weight (kg): 55 (10-12 @ 10:45)  % Weight Change    Pertinent Medications: MEDICATIONS  (STANDING):  aspirin enteric coated 81 milliGRAM(s) Oral daily  docusate sodium 100 milliGRAM(s) Oral three times a day  glycerin Suppository - Adult 1 Suppository(s) Rectal every 4 hours  lidocaine   Patch 1 Patch Transdermal daily  lidocaine   Patch 1 Patch Transdermal every 24 hours  lisinopril 2.5 milliGRAM(s) Oral daily  metoprolol succinate ER 25 milliGRAM(s) Oral daily  mirtazapine 7.5 milliGRAM(s) Oral at bedtime  oxyCODONE  ER Tablet 10 milliGRAM(s) Oral every 8 hours  pantoprazole    Tablet 40 milliGRAM(s) Oral before breakfast  polyethylene glycol 3350 17 Gram(s) Oral at bedtime  senna 3 Tablet(s) Oral at bedtime  warfarin 5 milliGRAM(s) Oral once    MEDICATIONS  (PRN):  naloxone Injectable 0.1 milliGRAM(s) IV Push every 3 minutes PRN Sedation and respiratory depression RR < 11  oxyCODONE    IR 5 milliGRAM(s) Oral every 4 hours PRN Mild, Moderate, Severe pain  phenylephrine 0.25% Suppository 1 Suppository(s) Rectal every 8 hours PRN Hemorrhoids    Pertinent Labs:          Skin:     Estimated Needs:   [ ] no change since previous assessment  [ ] recalculated:       Previous Nutrition Diagnosis:     [ ] Inadequate Energy Intake [ ]Inadequate Oral Intake [ ] Excessive Energy Intake     [ ] Underweight [ ] Increased Nutrient Needs [ ] Overweight/Obesity     [ ] Altered GI Function [ ] Unintended Weight Loss [ ] Food & Nutrition Related Knowledge Deficit [ ] Malnutrition          Nutrition Diagnosis is [ ] ongoing  [ ] resolved [ ] not applicable          New Nutrition Diagnosis: [ ] not applicable    [ ] Inadequate Protein Energy Intake [ ]Inadequate Oral Intake [ ] Excessive Energy Intake     [ ] Underweight [ ] Increased Nutrient Needs [ ] Overweight/Obesity     [ ] Altered GI Function [ ] Unintended Weight Loss [ ] Food & Nutrition Related Knowledge Deficit[ ] Limited Adherence to nutrition related recommendations [ ] Malnutrition  [ ] other: Free text       Related to:      As evidenced by:      Interventions:     Recommend    [ ] Change Diet To:    [ ] Nutrition Supplement    [ ] Nutrition Support    [ ] Other:        Monitoring and Evaluation:     [ ] PO intake [ ] Tolerance to diet prescription [ ] weights [ ] follow up per protocol    [ ] other: Source: Patient [ X]    Family [ ]     other [ X] RN, medical team.     Pt seen for malnutrition follow up. Pt reports an improving PO intake. States he consumes 2 ensure enlive yesterday and had 2 eggs 1 banana and cereal for breakfast today. Pt states he wants a wider variety of food options, RD reviewed options and food preferences. Pt was minimally amenable to reviewed HF nutrition therapy as well as coumadin diet education. Pt is able to use general teach back points which encouragement, Pt requires on going reinforcement.     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     Diet : Regular, Ensure enlive x3      Patient reports no GI distress, last BM was 10/11     PO intake:  <% of meals.    Source for PO intake [X ] Patient [ ] family [X ] chart [ ] staff [ ] other      Current Weight: (10/11) 54.2kg, admission Wt: 61kg. Continue to monitor, Pt's wt fluctuating     Pertinent Medications: MEDICATIONS  (STANDING):  aspirin enteric coated 81 milliGRAM(s) Oral daily  docusate sodium 100 milliGRAM(s) Oral three times a day  glycerin Suppository - Adult 1 Suppository(s) Rectal every 4 hours  lidocaine   Patch 1 Patch Transdermal daily  lidocaine   Patch 1 Patch Transdermal every 24 hours  lisinopril 2.5 milliGRAM(s) Oral daily  metoprolol succinate ER 25 milliGRAM(s) Oral daily  mirtazapine 7.5 milliGRAM(s) Oral at bedtime  oxyCODONE  ER Tablet 10 milliGRAM(s) Oral every 8 hours  pantoprazole    Tablet 40 milliGRAM(s) Oral before breakfast  polyethylene glycol 3350 17 Gram(s) Oral at bedtime  senna 3 Tablet(s) Oral at bedtime  warfarin 5 milliGRAM(s) Oral once    MEDICATIONS  (PRN):  naloxone Injectable 0.1 milliGRAM(s) IV Push every 3 minutes PRN Sedation and respiratory depression RR < 11  oxyCODONE    IR 5 milliGRAM(s) Oral every 4 hours PRN Mild, Moderate, Severe pain  phenylephrine 0.25% Suppository 1 Suppository(s) Rectal every 8 hours PRN Hemorrhoids    Pertinent Labs:  Hgb/Hct:9.4/26.9-low, Na:135, K:4.2, BUN:15, Cr:0.64, Glucose:98, Ca:9.2, LDH: 329-high      Skin: intact     Estimated Needs:   [X ] no change since previous assessment  [ ] recalculated:       Previous Nutrition Diagnosis:     [X] Increased Nutrient Needs  [X] Food & Nutrition Related Knowledge Deficit   [X] Malnutrition          Nutrition Diagnosis is [ X] ongoing, slowly improving appetite with Ensure enlive and improving knowledge deficit with education reinforcement          New Nutrition Diagnosis: [X ] not applicable     Interventions:     Recommend    1. Provide food preferences as requested by Pt/family within diet restrictions    2. Encourage PO intake during meal times   3. Continue Ensure enlive x3 daily to supplement intake  4. Continue to reinforce HF and coumadin diet education      Monitoring and Evaluation:     [X ] PO intake [X ] Tolerance to diet prescription [ X] weights [ X] follow up per protocol    [X ] other: RD remains available Sarah Siegler RD. Pager #[phone]

## 2017-10-13 NOTE — PROGRESS NOTE ADULT - PROBLEM SELECTOR PLAN 2
stable: can start spiriva on DC , pt has not been wheezing  doing ok  10/11: pt has not been wheezing and has been stable breathing wise: BD prn  10/13: the last ct scan chest on 23 of sept showed paraseptal emphysema: and left lower lobe rounded atlectasis: he would need repeat ct scan chest in december to ensure proper retisolution of the atelectasis:

## 2017-10-13 NOTE — PROGRESS NOTE ADULT - PROBLEM SELECTOR PLAN 1
No CT evidence of source  C/w Oxycontin 10mg PO TID, and Oxycodone 5 mg PO q 4 PRN   Monitor and hold for sedation/respiratory depression.

## 2017-10-13 NOTE — PROGRESS NOTE ADULT - ASSESSMENT
61 y/o Creole speaking male  presented with abdominal pain and  found to be in low output heart failure with cardiogenic shock (EF 10%) s/p 9/12 LVAD/TV annuloplasty for INTERMACS 2   Postop Course:   Acute blood loss anemia 2/2 blood loss postop -->requiring PRBC, amicar, PLT, cryo.  TTE 9/13 LVEDD 5.9 cm, mild-mod MR, aortic valve closed, mild RV dysfunction, mild TR.   c/o L sided pain; found to have L hemithorax s/p pigtail that was removed; fluid c/w exudative with lot of RBC,   (+) Hyponatremia - renal consulted- urine studies sent / Endocrine following for SIADH work up  10/6 Random Urine: 49; Patient euthyroid and not adrenally insufficient -->Patient dx with SIADH, on fluid restriction.  (+) depressive mood --> psych following; Remeron initiated; started at 7.5 mg HS; avoid up titrating 2/2 risk of worsening hyponatremia    LVAD interrogated w/o events.   10/9 Small amount of rectal bleeding after bowel movement; (+) internal hemorrhoids --> Continue bowel regimen / Start Preparation H suppository TID PRN.   No further bleeding noted. pt ambulating with a walker.  Psych follow up pending  Lisinopril d/c yesterday for hypotension, stable  Pain meds increased by palliative  10/12  coumadin dosing, na improving 134, ENDO for siadh, old ivl out refusing new ivl  10/13   coumadin dosing,   lisinopril added per HF

## 2017-10-13 NOTE — PROGRESS NOTE ADULT - SUBJECTIVE AND OBJECTIVE BOX
VITAL SIGNS    Telemetry:    NSR-ST      Vital Signs Last 24 Hrs  T(C): 36.5 (10-13-17 @ 09:00), Max: 37.2 (10-12-17 @ 21:00)  T(F): 97.7 (10-13-17 @ 09:00), Max: 98.9 (10-12-17 @ 21:00)  HR: 97 (10-13-17 @ 09:00) (97 - 102)  BP: --  RR: 18 (10-13-17 @ 09:00) (18 - 18)  SpO2: 99% (10-13-17 @ 09:00) (95% - 99%)             Daily Weight in k.1 (13 Oct 2017 12:00)      Coumadin    [ x] YES            Reason:     lvad                    PHYSICAL EXAM    Neurology: alert and oriented x 3, moves all extremities with no defecits  CV :  RRR  Sternal Wound :  CDI , Stable  LVAD dressing  CDI  Lungs:   CTA B/L  Abdomen: soft, nontender, nondistended, positive bowel sounds, last bowel movement    10/12  Extremities:       no edema  no calf tenderness

## 2017-10-13 NOTE — PROGRESS NOTE ADULT - SUBJECTIVE AND OBJECTIVE BOX
Interval Events: Pt denies abdominal pain, tolerating PO.    HPI:62M with no PMHx here with chest pain. Began last week, described as pleuritic, worsens with cough and deep inspiration. Has been having SOB as well, dry cough. Recently vacationed in [place] came back today. Saw doctor in [place] but unclear what workup was. No fever, sick contacts, abd pain. CP described as midsternal, nonradiating. Has 25 pack year smoking history.  CTPA does not reveal PE, Pulm congestion noted.     MEDICATIONS  (STANDING):  aspirin enteric coated 81 milliGRAM(s) Oral daily  docusate sodium 100 milliGRAM(s) Oral three times a day  glycerin Suppository - Adult 1 Suppository(s) Rectal every 4 hours  lidocaine   Patch 1 Patch Transdermal daily  lidocaine   Patch 1 Patch Transdermal every 24 hours  lisinopril 2.5 milliGRAM(s) Oral daily  metoprolol succinate ER 25 milliGRAM(s) Oral daily  mirtazapine 7.5 milliGRAM(s) Oral at bedtime  oxyCODONE  ER Tablet 10 milliGRAM(s) Oral every 8 hours  pantoprazole    Tablet 40 milliGRAM(s) Oral before breakfast  polyethylene glycol 3350 17 Gram(s) Oral at bedtime  senna 3 Tablet(s) Oral at bedtime  warfarin 5 milliGRAM(s) Oral once    MEDICATIONS  (PRN):  naloxone Injectable 0.1 milliGRAM(s) IV Push every 3 minutes PRN Sedation and respiratory depression RR < 11  oxyCODONE    IR 5 milliGRAM(s) Oral every 4 hours PRN Mild, Moderate, Severe pain  phenylephrine 0.25% Suppository 1 Suppository(s) Rectal every 8 hours PRN Hemorrhoids      Allergies    No Known Allergies    Intolerances        Review of Systems:    General:  No wt loss, fevers, chills, night sweats, fatigue,   Eyes:  Good vision, no reported pain  ENT:  No sore throat, pain, runny nose, dysphagia  CV:  No pain, palpitations, hypo/hypertension  Resp:  No dyspnea, cough, tachypnea, wheezing  GI:  No abdominal pain, No nausea, No vomiting, No diarrhea, No constipation, No weight loss, No fever, No pruritis, No rectal bleeding, No tarry stools, No dysphagia,  :  No pain, bleeding, incontinence, nocturia  Muscle:  No pain, weakness  Neuro:  No weakness, tingling, memory problems  Psych:  No fatigue, insomnia, mood problems, depression  Endocrine:  No polyuria, polydipsia, cold/heat intolerance  Heme:  No petechiae, ecchymosis, easy bruisability  Skin:  No rash, tattoos, scars, edema          Vital Signs Last 24 Hrs  T(C): 36.5 (13 Oct 2017 09:00), Max: 37.2 (12 Oct 2017 21:00)  T(F): 97.7 (13 Oct 2017 09:00), Max: 98.9 (12 Oct 2017 21:00)  HR: 97 (13 Oct 2017 09:00) (97 - 102)  BP: --  BP(mean): 70 (13 Oct 2017 09:00) (70 - 86)  RR: 18 (13 Oct 2017 09:00) (18 - 18)  SpO2: 99% (13 Oct 2017 09:00) (95% - 99%)    PHYSICAL EXAM:    GENERAL:  Appears stated age, well-groomed, well-nourished, no distress  HEENT:  NC/AT,  conjunctivae clear and pink, no thyromegaly, nodules, adenopathy, no JVD, sclera -anicteric  CHEST:  Full & symmetric excursion, no increased effort, breath sounds clear  HEART:  Regular rhythm, S1, S2, no murmur/rub/S3/S4, no abdominal bruit, no edema  ABDOMEN:  Soft, NT, non-distended, normoactive bowel sounds,  no masses ,no hepato-splenomegaly, no signs of chronic liver disease  EXTREMITIES:  no cyanosis, clubbing or edema  SKIN:  No rash/erythema/ecchymoses/petechiae/wounds/abscess/warm/dry  NEURO:  Alert, oriented, no asterixis, no tremor, no encephalopathy      LABS:                        9.4    7.8   )-----------( 241      ( 13 Oct 2017 05:19 )             26.9     10-13    135  |  96  |  15  ----------------------------<  98  4.2   |  27  |  0.64    Ca    9.2      13 Oct 2017 05:19      PT/INR - ( 13 Oct 2017 05:34 )   PT: 23.0 sec;   INR: 2.08 ratio         PTT - ( 13 Oct 2017 05:34 )  PTT:31.5 sec      RADIOLOGY & ADDITIONAL TESTS:

## 2017-10-13 NOTE — PROGRESS NOTE ADULT - PROBLEM SELECTOR PLAN 1
- Continue with ASA     - Daily LDH   - Continue with AC therapy on coumadin for a therapeutic INR goal 2-3  - Increase activity as tolerated   - D/C plan Rehab once medically cleared; plan of care discussed with attending  hopeful  monday

## 2017-10-13 NOTE — PROGRESS NOTE ADULT - SUBJECTIVE AND OBJECTIVE BOX
INTERVAL HPI/OVERNIGHT EVENTS: Patient still with intermittent abdominal pain, but improved. Appears frustrated.     Allergies    No Known Allergies    Intolerances        ADVANCE DIRECTIVES:    DNR: [x ] NO [ ] YES (Date) MOLST [ ] NO [ ] YES (Date)    MEDICATIONS  (STANDING):  aspirin enteric coated 81 milliGRAM(s) Oral daily  docusate sodium 100 milliGRAM(s) Oral three times a day  glycerin Suppository - Adult 1 Suppository(s) Rectal every 4 hours  lidocaine   Patch 1 Patch Transdermal daily  lidocaine   Patch 1 Patch Transdermal every 24 hours  metoprolol succinate ER 12.5 milliGRAM(s) Oral daily  mirtazapine 7.5 milliGRAM(s) Oral at bedtime  oxyCODONE  ER Tablet 10 milliGRAM(s) Oral every 8 hours  pantoprazole    Tablet 40 milliGRAM(s) Oral before breakfast  polyethylene glycol 3350 17 Gram(s) Oral at bedtime  senna 3 Tablet(s) Oral at bedtime  warfarin 4 milliGRAM(s) Oral once    MEDICATIONS  (PRN):  naloxone Injectable 0.1 milliGRAM(s) IV Push every 3 minutes PRN Sedation and respiratory depression RR < 11  oxyCODONE    IR 5 milliGRAM(s) Oral every 4 hours PRN Mild, Moderate, Severe pain  phenylephrine 0.25% Suppository 1 Suppository(s) Rectal every 8 hours PRN Hemorrhoids      PRESENT SYMPTOMS:  Source: [x ] Patient   [ ] Family   [ ] Team     Pain:                        [ ] No [x ] Yes             [x ] Mild [ ] Moderate  to  Severe    Onset - gradual  Location - left lower costal margin. Left Flank   Duration - constant  Character - dull, deep  Alleviating/Aggravating - pain meds/not able to indicate   Radiation - towards mid abdominal region.   Timing -none    Dyspnea:                [x ] No [ ] Yes             [ ] Mild [ ] Moderate [ ] Severe    Anxiety:                  [x] No [ ] Yes             [ ] Mild [ ] Moderate [ ] Severe    Fatigue:                  [ ] No [x Yes             [x ] Mild [ ] Moderate [ ] Severe    Nausea:                  [x ] No [ ] Yes             [ ] Mild [ ] Moderate [ ] Severe    Loss of appetite:   [x No [ ] Yes             [ ] Mild [ ] Moderate [ ] Severe    Constipation:        [x ] No [ ] Yes             [ ] Mild [ ] Moderate [ ] Severe    Other Symptoms:  [x ] All other review of systems negative   [ ] Unable to obtain due to poor mentation     Karnofsky Performance Score/Palliative Performance Status Version 2:      40   %    PHYSICAL EXAM:  Vital Signs Last 24 Hrs  T(C): 36.7 (10 Oct 2017 14:00), Max: 36.7 (10 Oct 2017 04:54)  T(F): 98 (10 Oct 2017 14:00), Max: 98 (10 Oct 2017 04:54)  HR: 100 (10 Oct 2017 14:00) (91 - 102)  BP: --  BP(mean): 70 (10 Oct 2017 14:00) (70 - 74)  RR: 18 (10 Oct 2017 14:00) (16 - 18)  SpO2: 100% (10 Oct 2017 14:00) (100% - 100%)        General:  [x ] Alert  [x ] Oriented x 3     [ ] Lethargic  [ ] Agitated   [x ] Cachexia   [ ] Unarousable  [x ] Verbal  [ ] Non-Verbal [x] acute distress due to pain 	    HEENT:  [x ] Normal   [ ] Dry mouth   [ ] ET Tube    [ ] Trach  [ ] Oral lesions    Lungs:   [x ] Clear [ ] Tachypnea  [ ] Audible excessive secretions   [ ] Rhonchi        [ ] Right [ ] Left [ ] Bilateral  [ ] Crackles        [ ] Right [ ] Left [ ] Bilateral  [ ] Wheezing     [ ] Right [ ] Left [ ] Bilateral    Cardiovascular:  [x ] Regular [ ] Irregular [ ] Tachycardia   [ ] Bradycardia  [ ] Murmur [ ] Other. Mid sternal scar.     Abdomen: [x ] Soft  [ ] Distended   [ x] +BS  [x ] Non tender [x ] Tender over Left flank  [ ]PEG   [ ]OGT/ NGT   Last BM:  10/9     Genitourinary: [x ] Normal [ ] Incontinent   [ ] Oliguria/Anuria   [ ] Landrum    Musculoskeletal:  [x ] Normal   [ ] Weakness  [ ] Bedbound/Wheelchair bound [ ] Edema    Neurological: [x ] No focal deficits  [ ] Cognitive impairment  [ ] Dysphagia [ ] Dysarthria [ ] Paresis [ ] Other     Skin: [x ] Normal   [ ] Pressure ulcer(s)    mid line incision              [ ] Rash    LABS:                                              9.8    8.3   )-----------( 278      ( 10 Oct 2017 05:54 )             28.8          Oral Intake: [ ] Unable/mouth care only [ ] Minimal [ ] Moderate [x] Full Capability    Diet: [ ] NPO [ ] Tube feeds [ ] TPN [ ] Other     RADIOLOGY & ADDITIONAL STUDIES: reviewed    REFERRALS:   [ ] Chaplaincy  [ ] Hospice  [ ] Child Life  [ ] Social Work  [ ] Case management [ ] Holistic Therapy

## 2017-10-13 NOTE — PROGRESS NOTE ADULT - SUBJECTIVE AND OBJECTIVE BOX
Subjective: No current complaints. He feels well.     Medications:  aspirin enteric coated 81 milliGRAM(s) Oral daily  docusate sodium 100 milliGRAM(s) Oral three times a day  glycerin Suppository - Adult 1 Suppository(s) Rectal every 4 hours  lidocaine   Patch 1 Patch Transdermal daily  lidocaine   Patch 1 Patch Transdermal every 24 hours  lisinopril 2.5 milliGRAM(s) Oral daily  metoprolol succinate ER 25 milliGRAM(s) Oral daily  mirtazapine 7.5 milliGRAM(s) Oral at bedtime  naloxone Injectable 0.1 milliGRAM(s) IV Push every 3 minutes PRN  oxyCODONE    IR 5 milliGRAM(s) Oral every 4 hours PRN  oxyCODONE  ER Tablet 10 milliGRAM(s) Oral every 8 hours  pantoprazole    Tablet 40 milliGRAM(s) Oral before breakfast  phenylephrine 0.25% Suppository 1 Suppository(s) Rectal every 8 hours PRN  polyethylene glycol 3350 17 Gram(s) Oral at bedtime  senna 3 Tablet(s) Oral at bedtime  warfarin 5 milliGRAM(s) Oral once    Physical Exam:    Vital Signs Last 24 Hrs  T(C): 36.5 (13 Oct 2017 09:00), Max: 37.2 (12 Oct 2017 21:00)  T(F): 97.7 (13 Oct 2017 09:00), Max: 98.9 (12 Oct 2017 21:00)  HR: 97 (13 Oct 2017 09:00) (97 - 102)  BP(mean): 70 (13 Oct 2017 09:00) (70 - 86)  RR: 18 (13 Oct 2017 09:00) (18 - 18)  SpO2: 99% (13 Oct 2017 09:00) (95% - 99%)    Daily     Daily Weight in k.1 (13 Oct 2017 12:00)    I&O's Summary    12 Oct 2017 07:  -  13 Oct 2017 07:00  --------------------------------------------------------  IN: 560 mL / OUT: 700 mL / NET: -140 mL    13 Oct 2017 07:01  -  13 Oct 2017 14:28  --------------------------------------------------------  IN: 800 mL / OUT: 0 mL / NET: 800 mL    General: No distress. Comfortable.  HEENT: EOM intact.  Neck: Neck supple. JVP not elevated. No masses  Chest: Clear to auscultation bilaterally  CV: Typical LVAD sounds. No distal pulses  Abdomen: Soft, non-distended, non-tender  Driveline exit site: Clean, dry, and intact  Skin: No rashes or skin breakdown  Neurology: Alert and oriented times three. Sensation intact  Psych: Affect normal    LVAD Interrogation: Heart Mate II  RPMs: 9000  Power: 5.6  Flow: 5.6  PI: 6.3  Event: None  No programming changes were made    Labs:                        9.4    7.8   )-----------( 241      ( 13 Oct 2017 05:19 )             26.9     10-13    135  |  96  |  15  ----------------------------<  98  4.2   |  27  |  0.64    Ca    9.2      13 Oct 2017 05:19      PT/INR - ( 13 Oct 2017 05:34 )   PT: 23.0 sec;   INR: 2.08 ratio         PTT - ( 13 Oct 2017 05:34 )  PTT:31.5 sec    Lactate Dehydrogenase, Serum: 329 U/L (10-13 @ 05:19)  Lactate Dehydrogenase, Serum: 459 U/L (10-12 @ 07:14)

## 2017-10-13 NOTE — PROGRESS NOTE ADULT - SUBJECTIVE AND OBJECTIVE BOX
Patient is a 62y old  Male who presents with a chief complaint of SOOB (25 Aug 2017 22:27)   I have pain in the left side of my stomach  my breathing is fine     Any change in ROS:     MEDICATIONS  (STANDING):  aspirin enteric coated 81 milliGRAM(s) Oral daily  docusate sodium 100 milliGRAM(s) Oral three times a day  glycerin Suppository - Adult 1 Suppository(s) Rectal every 4 hours  lidocaine   Patch 1 Patch Transdermal daily  lidocaine   Patch 1 Patch Transdermal every 24 hours  lisinopril 2.5 milliGRAM(s) Oral daily  metoprolol succinate ER 25 milliGRAM(s) Oral daily  mirtazapine 7.5 milliGRAM(s) Oral at bedtime  oxyCODONE  ER Tablet 10 milliGRAM(s) Oral every 8 hours  pantoprazole    Tablet 40 milliGRAM(s) Oral before breakfast  polyethylene glycol 3350 17 Gram(s) Oral at bedtime  senna 3 Tablet(s) Oral at bedtime  warfarin 5 milliGRAM(s) Oral once    MEDICATIONS  (PRN):  naloxone Injectable 0.1 milliGRAM(s) IV Push every 3 minutes PRN Sedation and respiratory depression RR < 11  oxyCODONE    IR 5 milliGRAM(s) Oral every 4 hours PRN Mild, Moderate, Severe pain  phenylephrine 0.25% Suppository 1 Suppository(s) Rectal every 8 hours PRN Hemorrhoids    Vital Signs Last 24 Hrs  T(C): 36.7 (13 Oct 2017 05:00), Max: 37.2 (12 Oct 2017 21:00)  T(F): 98.1 (13 Oct 2017 05:00), Max: 98.9 (12 Oct 2017 21:00)  HR: 97 (13 Oct 2017 09:00) (97 - 102)  BP: --  BP(mean): 78 (13 Oct 2017 05:00) (78 - 86)  RR: 18 (13 Oct 2017 05:00) (18 - 18)  SpO2: 99% (13 Oct 2017 05:00) (95% - 99%)    I&O's Summary    12 Oct 2017 07:01  -  13 Oct 2017 07:00  --------------------------------------------------------  IN: 560 mL / OUT: 700 mL / NET: -140 mL          Physical Exam:   GENERAL: cachectic  HEENT: ALONSO/   Atraumatic, Normocephalic  ENMT: No tonsillar erythema, exudates, or enlargement; Moist mucous membranes, Good dentition, No lesions  NECK: Supple, No JVD, Normal thyroid  CHEST/LUNG: Clear to auscultaion, ; No rales, rhonchi, wheezing, or rubs  CVS: Regular rate and rhythm; No murmurs, rubs, or gallops  GI: : Soft, Nontender, Nondistended; Bowel sounds present  NERVOUS SYSTEM:  Alert & Oriented X3  EXTREMITIES:  2+ Peripheral Pulses, No clubbing, cyanosis, or edema  LYMPH: No lymphadenopathy noted  SKIN: No rashes or lesions  ENDOCRINOLOGY: No Thyromegaly  PSYCH: Appropriate    Labs:                              9.4    7.8   )-----------( 241      ( 13 Oct 2017 05:19 )             26.9                         9.4    7.83  )-----------( 299      ( 12 Oct 2017 07:14 )             29.0                         10.3   10.3  )-----------( 256      ( 11 Oct 2017 05:39 )             30.8                         9.8    8.3   )-----------( 278      ( 10 Oct 2017 05:54 )             28.8     10-13    135  |  96  |  15  ----------------------------<  98  4.2   |  27  |  0.64  10-12    134<L>  |  94<L>  |  16  ----------------------------<  109<H>  4.5   |  29  |  0.73  10-11    133<L>  |  94<L>  |  16  ----------------------------<  97  4.3   |  30  |  0.69  10-10    132<L>  |  93<L>  |  18  ----------------------------<  94  4.5   |  27  |  0.57    Ca    9.2      13 Oct 2017 05:19  Ca    9.5      12 Oct 2017 05:51      CAPILLARY BLOOD GLUCOSE            PT/INR - ( 13 Oct 2017 05:34 )   PT: 23.0 sec;   INR: 2.08 ratio         PTT - ( 13 Oct 2017 05:34 )  PTT:31.5 sec    Cultures:           < from: Xray Chest 1 View AP- PORTABLE-Urgent (09.29.17 @ 16:37) >    EXAM:  CHEST-PORTABLE URGENT                            PROCEDURE DATE:  09/29/2017            INTERPRETATION:  AP chest with comparison to 1347 hours from the same day.    IMPRESSION: Left pigtail catheter removed.    IMPRESSION: Left-sided pigtail catheter has been removed. There is no   pneumothorax. The heart is normal in size. The patient is status post   median sternotomy. The lungs are clear.                    TIA BENAVIDES M.D., ATTENDING RADIOLOGIST  This document has been electronically signed. Sep 30 2017  4:18PM    < end of copied text >        < from: CT Chest No Cont (09.23.17 @ 12:44) >  BLADDER: Within normal limits.  REPRODUCTIVE ORGANS: The prostate is within normal limits.    BOWEL: No bowel obstruction.   PERITONEUM: No ascites. Mild mesenteric edema.  VESSELS:  Atherosclerotic changes of the abdominal aorta andbilateral   iliac vessels.  RETROPERITONEUM: No lymphadenopathy.    ABDOMINAL WALL: Mild anasarca.  BONES: Mild degenerative changes of the visualized spine, worse at L4-5   level.    IMPRESSION:     Evaluation for a source of infection is limited secondary to lack of   intravenous contrast.     Status post LVAD placement.    Paraseptal emphysema.    Left lower lobe rounded atelectasis.    < end of copied text >              Studies  Chest X-RAY  CT SCAN Chest   Venous Dopplers: LE:   CT Abdomen  Others

## 2017-10-14 LAB
ANION GAP SERPL CALC-SCNC: 11 MMOL/L — SIGNIFICANT CHANGE UP (ref 5–17)
APTT BLD: 31.3 SEC — SIGNIFICANT CHANGE UP (ref 27.5–37.4)
BUN SERPL-MCNC: 14 MG/DL — SIGNIFICANT CHANGE UP (ref 7–23)
CALCIUM SERPL-MCNC: 9.2 MG/DL — SIGNIFICANT CHANGE UP (ref 8.4–10.5)
CHLORIDE SERPL-SCNC: 97 MMOL/L — SIGNIFICANT CHANGE UP (ref 96–108)
CO2 SERPL-SCNC: 28 MMOL/L — SIGNIFICANT CHANGE UP (ref 22–31)
CREAT SERPL-MCNC: 0.63 MG/DL — SIGNIFICANT CHANGE UP (ref 0.5–1.3)
GLUCOSE SERPL-MCNC: 97 MG/DL — SIGNIFICANT CHANGE UP (ref 70–99)
HCT VFR BLD CALC: 27.9 % — LOW (ref 39–50)
HGB BLD-MCNC: 9.5 G/DL — LOW (ref 13–17)
INR BLD: 1.82 RATIO — HIGH (ref 0.88–1.16)
LDH SERPL L TO P-CCNC: 374 U/L — HIGH (ref 50–242)
MCHC RBC-ENTMCNC: 32.8 PG — SIGNIFICANT CHANGE UP (ref 27–34)
MCHC RBC-ENTMCNC: 34.1 GM/DL — SIGNIFICANT CHANGE UP (ref 32–36)
MCV RBC AUTO: 96.2 FL — SIGNIFICANT CHANGE UP (ref 80–100)
PLATELET # BLD AUTO: 243 K/UL — SIGNIFICANT CHANGE UP (ref 150–400)
POTASSIUM SERPL-MCNC: 4.8 MMOL/L — SIGNIFICANT CHANGE UP (ref 3.5–5.3)
POTASSIUM SERPL-SCNC: 4.8 MMOL/L — SIGNIFICANT CHANGE UP (ref 3.5–5.3)
PROTHROM AB SERPL-ACNC: 19.9 SEC — HIGH (ref 9.8–12.7)
RBC # BLD: 2.9 M/UL — LOW (ref 4.2–5.8)
RBC # FLD: 13.5 % — SIGNIFICANT CHANGE UP (ref 10.3–14.5)
SODIUM SERPL-SCNC: 136 MMOL/L — SIGNIFICANT CHANGE UP (ref 135–145)
WBC # BLD: 8.8 K/UL — SIGNIFICANT CHANGE UP (ref 3.8–10.5)
WBC # FLD AUTO: 8.8 K/UL — SIGNIFICANT CHANGE UP (ref 3.8–10.5)

## 2017-10-14 PROCEDURE — 93750 INTERROGATION VAD IN PERSON: CPT

## 2017-10-14 PROCEDURE — 99233 SBSQ HOSP IP/OBS HIGH 50: CPT | Mod: 25

## 2017-10-14 PROCEDURE — 99231 SBSQ HOSP IP/OBS SF/LOW 25: CPT

## 2017-10-14 RX ORDER — LISINOPRIL 2.5 MG/1
5 TABLET ORAL DAILY
Qty: 0 | Refills: 0 | Status: DISCONTINUED | OUTPATIENT
Start: 2017-10-15 | End: 2017-10-17

## 2017-10-14 RX ORDER — WARFARIN SODIUM 2.5 MG/1
6 TABLET ORAL ONCE
Qty: 0 | Refills: 0 | Status: COMPLETED | OUTPATIENT
Start: 2017-10-14 | End: 2017-10-14

## 2017-10-14 RX ORDER — MULTIVIT WITH MIN/MFOLATE/K2 340-15/3 G
250 POWDER (GRAM) ORAL ONCE
Qty: 0 | Refills: 0 | Status: COMPLETED | OUTPATIENT
Start: 2017-10-14 | End: 2017-10-14

## 2017-10-14 RX ADMIN — OXYCODONE HYDROCHLORIDE 10 MILLIGRAM(S): 5 TABLET ORAL at 14:23

## 2017-10-14 RX ADMIN — OXYCODONE HYDROCHLORIDE 10 MILLIGRAM(S): 5 TABLET ORAL at 05:17

## 2017-10-14 RX ADMIN — LISINOPRIL 2.5 MILLIGRAM(S): 2.5 TABLET ORAL at 05:17

## 2017-10-14 RX ADMIN — Medication 100 MILLIGRAM(S): at 13:53

## 2017-10-14 RX ADMIN — MIRTAZAPINE 7.5 MILLIGRAM(S): 45 TABLET, ORALLY DISINTEGRATING ORAL at 21:26

## 2017-10-14 RX ADMIN — OXYCODONE HYDROCHLORIDE 10 MILLIGRAM(S): 5 TABLET ORAL at 13:53

## 2017-10-14 RX ADMIN — OXYCODONE HYDROCHLORIDE 10 MILLIGRAM(S): 5 TABLET ORAL at 05:50

## 2017-10-14 RX ADMIN — SENNA PLUS 3 TABLET(S): 8.6 TABLET ORAL at 21:27

## 2017-10-14 RX ADMIN — PANTOPRAZOLE SODIUM 40 MILLIGRAM(S): 20 TABLET, DELAYED RELEASE ORAL at 05:17

## 2017-10-14 RX ADMIN — Medication 100 MILLIGRAM(S): at 05:18

## 2017-10-14 RX ADMIN — Medication 25 MILLIGRAM(S): at 05:18

## 2017-10-14 RX ADMIN — WARFARIN SODIUM 6 MILLIGRAM(S): 2.5 TABLET ORAL at 21:26

## 2017-10-14 RX ADMIN — POLYETHYLENE GLYCOL 3350 17 GRAM(S): 17 POWDER, FOR SOLUTION ORAL at 21:27

## 2017-10-14 RX ADMIN — Medication 81 MILLIGRAM(S): at 13:53

## 2017-10-14 RX ADMIN — Medication 100 MILLIGRAM(S): at 21:25

## 2017-10-14 RX ADMIN — Medication 1 SUPPOSITORY(S): at 11:27

## 2017-10-14 NOTE — PROGRESS NOTE ADULT - SUBJECTIVE AND OBJECTIVE BOX
VITAL SIGNS    Telemetry:  NSR  ST      Vital Signs Last 24 Hrs  T(C): 36.6 (10-14-17 @ 13:00), Max: 36.9 (10-13-17 @ 21:00)  T(F): 97.9 (10-14-17 @ 13:00), Max: 98.4 (10-13-17 @ 21:00)  HR: 97 (10-14-17 @ 13:00) (97 - 101)  BP: --  RR: 18 (10-14-17 @ 13:00) (16 - 18)  SpO2: 100% (10-14-17 @ 13:00) (100% - 100%)            Daily         CAPILLARY BLOOD GLUCOSE                         PHYSICAL EXAM    Neurology: alert and oriented x 3, moves all extremities with no defecits  CV :  RRR  Sternal Wound :  CDI , Stable    drive line intact  Lungs:   CTA B/L  Abdomen: soft, nontender, nondistended, positive bowel sounds, last bowel movement    10/12  Extremities:       cachetic   no edema   no calf tenderness

## 2017-10-14 NOTE — PROGRESS NOTE ADULT - ASSESSMENT
61 y/o Creole speaking man who presented with abdominal pain and was found to be in low output heart failure with cardiogenic shock (EF 10%). He underwent HM2 LVAD implantation with TV annuloplasty ring on 9/12. Hospital course complicated by left sided flank pain, which is improving. Currently euvolemic and normotensive with MAP at goal. Discharge has been complicated by lack of rehab options, which we are investigating.    Discussed with [facility] NP team. Please call me with questions at [phone].

## 2017-10-14 NOTE — PROGRESS NOTE ADULT - ASSESSMENT
61 y/o Creole speaking male  presented with abdominal pain and  found to be in low output heart failure with cardiogenic shock (EF 10%) s/p 9/12 LVAD/TV annuloplasty for INTERMACS 2   Postop Course:   Acute blood loss anemia 2/2 blood loss postop -->requiring PRBC, amicar, PLT, cryo.  TTE 9/13 LVEDD 5.9 cm, mild-mod MR, aortic valve closed, mild RV dysfunction, mild TR.   c/o L sided pain; found to have L hemithorax s/p pigtail that was removed; fluid c/w exudative with lot of RBC,   (+) Hyponatremia - renal consulted- urine studies sent / Endocrine following for SIADH work up  10/6 Random Urine: 49; Patient euthyroid and not adrenally insufficient -->Patient dx with SIADH, on fluid restriction.  (+) depressive mood --> psych following; Remeron initiated; started at 7.5 mg HS; avoid up titrating 2/2 risk of worsening hyponatremia    LVAD interrogated w/o events.   10/9 Small amount of rectal bleeding after bowel movement; (+) internal hemorrhoids --> Continue bowel regimen / Start Preparation H suppository TID PRN.   No further bleeding noted. pt ambulating with a walker.  Psych follow up pending  Lisinopril d/c yesterday for hypotension, stable  Pain meds increased by palliative  10/12  coumadin dosing, na improving 134, ENDO for siadh, old ivl out refusing new ivl  10/13   coumadin dosing,   lisinopril added per HF  10/14   VSS   ambulating

## 2017-10-14 NOTE — PROGRESS NOTE ADULT - PROBLEM SELECTOR PLAN 2
Caily LDH  Increase coumadin dose to 6 mg for goal INR of 2-2.5  continue current dose of aspirin at 81 mg daily

## 2017-10-14 NOTE — PROGRESS NOTE ADULT - SUBJECTIVE AND OBJECTIVE BOX
Subjective: No current events. He complains of constipation.    Medications:  aspirin enteric coated 81 milliGRAM(s) Oral daily  docusate sodium 100 milliGRAM(s) Oral three times a day  glycerin Suppository - Adult 1 Suppository(s) Rectal every 4 hours  lidocaine   Patch 1 Patch Transdermal daily  lidocaine   Patch 1 Patch Transdermal every 24 hours  metoprolol succinate ER 25 milliGRAM(s) Oral daily  mirtazapine 7.5 milliGRAM(s) Oral at bedtime  naloxone Injectable 0.1 milliGRAM(s) IV Push every 3 minutes PRN  oxyCODONE    IR 5 milliGRAM(s) Oral every 4 hours PRN  oxyCODONE  ER Tablet 10 milliGRAM(s) Oral every 8 hours  pantoprazole    Tablet 40 milliGRAM(s) Oral before breakfast  phenylephrine 0.25% Suppository 1 Suppository(s) Rectal every 8 hours PRN  polyethylene glycol 3350 17 Gram(s) Oral at bedtime  senna 3 Tablet(s) Oral at bedtime  warfarin 6 milliGRAM(s) Oral once      Physical Exam:    Vital Signs Last 24 Hrs  T(C): 36.2 (14 Oct 2017 17:00), Max: 36.9 (13 Oct 2017 21:00)  T(F): 97.1 (14 Oct 2017 17:00), Max: 98.4 (13 Oct 2017 21:00)  HR: 101 (14 Oct 2017 17:00) (97 - 101)  BP(mean): 76 (14 Oct 2017 17:00) (72 - 80)  RR: 18 (14 Oct 2017 17:00) (16 - 18)  SpO2: 100% (14 Oct 2017 17:00) (100% - 100%)    I&O's Summary    13 Oct 2017 07:01  -  14 Oct 2017 07:00  --------------------------------------------------------  IN: 1320 mL / OUT: 950 mL / NET: 370 mL    14 Oct 2017 07:01  -  14 Oct 2017 18:27  --------------------------------------------------------  IN: 0 mL / OUT: 0 mL / NET: 0 mL    General: No distress. Comfortable.  HEENT: EOM intact.  Neck: Neck supple. JVP not elevated. No masses  Chest: Clear to auscultation bilaterally  CV: Typical LVAD sounds. No distal pulses  Abdomen: Soft, non-distended, non-tender  Driveline exit site: Clean, dry, and intact  Skin: No rashes or skin breakdown  Neurology: Alert and oriented times three. Sensation intact  Psych: Affect normal    LVAD Interrogation: Heart Mate II  RPMs: 9000  Power: 5.3  Flow: 5.1  PI: 5.8  Event: None  No programming changes were made    Labs:                        9.5    8.8   )-----------( 243      ( 14 Oct 2017 05:14 )             27.9     10-14    136  |  97  |  14  ----------------------------<  97  4.8   |  28  |  0.63    Ca    9.2      14 Oct 2017 05:46      PT/INR - ( 14 Oct 2017 05:14 )   PT: 19.9 sec;   INR: 1.82 ratio         PTT - ( 14 Oct 2017 05:14 )  PTT:31.3 sec    Lactate Dehydrogenase, Serum: 374 U/L (10-14 @ 05:46)  Lactate Dehydrogenase, Serum: 329 U/L (10-13 @ 05:19)  Lactate Dehydrogenase, Serum: 459 U/L (10-12 @ 07:14)

## 2017-10-14 NOTE — PROGRESS NOTE ADULT - SUBJECTIVE AND OBJECTIVE BOX
Chief complaint  Patient is a 62y old  Male who presents with a chief complaint of SOOB (25 Aug 2017 22:27)   Review of systems  Patient in bed, comfortable, no fever,  no hypoglycemia.    Labs and Fingersticks    CAPILLARY BLOOD GLUCOSE    Anion Gap, Serum: 11 (10-14 @ 05:46)  Anion Gap, Serum: 12 (10-13 @ 05:19)      Calcium, Total Serum: 9.2 (10-14 @ 05:46)  Calcium, Total Serum: 9.2 (10-13 @ 05:19)          10-14    136  |  97  |  14  ----------------------------<  97  4.8   |  28  |  0.63    Ca    9.2      14 Oct 2017 05:46                          9.5    8.8   )-----------( 243      ( 14 Oct 2017 05:14 )             27.9     Medications  MEDICATIONS  (STANDING):  aspirin enteric coated 81 milliGRAM(s) Oral daily  docusate sodium 100 milliGRAM(s) Oral three times a day  glycerin Suppository - Adult 1 Suppository(s) Rectal every 4 hours  lidocaine   Patch 1 Patch Transdermal daily  lidocaine   Patch 1 Patch Transdermal every 24 hours  lisinopril 2.5 milliGRAM(s) Oral daily  metoprolol succinate ER 25 milliGRAM(s) Oral daily  mirtazapine 7.5 milliGRAM(s) Oral at bedtime  oxyCODONE  ER Tablet 10 milliGRAM(s) Oral every 8 hours  pantoprazole    Tablet 40 milliGRAM(s) Oral before breakfast  polyethylene glycol 3350 17 Gram(s) Oral at bedtime  senna 3 Tablet(s) Oral at bedtime      Physical Exam  General: Patient comfortable in bed  Vital Signs Last 12 Hrs  T(F): 97.5 (10-14-17 @ 09:00), Max: 98 (10-14-17 @ 01:00)  HR: 97 (10-14-17 @ 09:00) (97 - 99)  BP: --  BP(mean): 72 (10-14-17 @ 09:00) (72 - 80)  RR: 18 (10-14-17 @ 09:00) (16 - 18)  SpO2: 100% (10-14-17 @ 09:00) (100% - 100%)  Neck: No palpable thyroid nodules.  CVS: S1S2, No murmurs  Respiratory: No wheezing, no crepitations  GI: Abdomen soft, bowel sounds positive  Musculoskeletal: Positive edema lower extremities bilaterally  Skin: No skin rashes, no ecchymosis    Diagnostics    Osmolality, Random Urine: Routine  Cancel Reason: Lab Operations Cancel (10-06 @ 10:48)  Osmolality, Serum: Routine (10-06 @ 10:48)  Sodium, Random Urine: Routine (10-06 @ 10:48)

## 2017-10-15 LAB
ANION GAP SERPL CALC-SCNC: 11 MMOL/L — SIGNIFICANT CHANGE UP (ref 5–17)
APTT BLD: 33.1 SEC — SIGNIFICANT CHANGE UP (ref 27.5–37.4)
APTT BLD: 33.7 SEC — SIGNIFICANT CHANGE UP (ref 27.5–37.4)
BUN SERPL-MCNC: 12 MG/DL — SIGNIFICANT CHANGE UP (ref 7–23)
CALCIUM SERPL-MCNC: 9.3 MG/DL — SIGNIFICANT CHANGE UP (ref 8.4–10.5)
CHLORIDE SERPL-SCNC: 96 MMOL/L — SIGNIFICANT CHANGE UP (ref 96–108)
CO2 SERPL-SCNC: 28 MMOL/L — SIGNIFICANT CHANGE UP (ref 22–31)
CREAT SERPL-MCNC: 0.58 MG/DL — SIGNIFICANT CHANGE UP (ref 0.5–1.3)
GLUCOSE SERPL-MCNC: 91 MG/DL — SIGNIFICANT CHANGE UP (ref 70–99)
HCT VFR BLD CALC: 27.6 % — LOW (ref 39–50)
HGB BLD-MCNC: 9.5 G/DL — LOW (ref 13–17)
INR BLD: 1.78 RATIO — HIGH (ref 0.88–1.16)
INR BLD: 1.99 RATIO — HIGH (ref 0.88–1.16)
LDH SERPL L TO P-CCNC: 451 U/L — HIGH (ref 50–242)
MCHC RBC-ENTMCNC: 33.3 PG — SIGNIFICANT CHANGE UP (ref 27–34)
MCHC RBC-ENTMCNC: 34.5 GM/DL — SIGNIFICANT CHANGE UP (ref 32–36)
MCV RBC AUTO: 96.3 FL — SIGNIFICANT CHANGE UP (ref 80–100)
PLATELET # BLD AUTO: 238 K/UL — SIGNIFICANT CHANGE UP (ref 150–400)
POTASSIUM SERPL-MCNC: 4.3 MMOL/L — SIGNIFICANT CHANGE UP (ref 3.5–5.3)
POTASSIUM SERPL-SCNC: 4.3 MMOL/L — SIGNIFICANT CHANGE UP (ref 3.5–5.3)
PROTHROM AB SERPL-ACNC: 19.6 SEC — HIGH (ref 9.8–12.7)
PROTHROM AB SERPL-ACNC: 21.8 SEC — HIGH (ref 9.8–12.7)
RBC # BLD: 2.87 M/UL — LOW (ref 4.2–5.8)
RBC # FLD: 13.5 % — SIGNIFICANT CHANGE UP (ref 10.3–14.5)
SODIUM SERPL-SCNC: 135 MMOL/L — SIGNIFICANT CHANGE UP (ref 135–145)
WBC # BLD: 8.7 K/UL — SIGNIFICANT CHANGE UP (ref 3.8–10.5)
WBC # FLD AUTO: 8.7 K/UL — SIGNIFICANT CHANGE UP (ref 3.8–10.5)

## 2017-10-15 PROCEDURE — 99231 SBSQ HOSP IP/OBS SF/LOW 25: CPT

## 2017-10-15 PROCEDURE — 99233 SBSQ HOSP IP/OBS HIGH 50: CPT | Mod: 25

## 2017-10-15 PROCEDURE — 93750 INTERROGATION VAD IN PERSON: CPT

## 2017-10-15 RX ORDER — WARFARIN SODIUM 2.5 MG/1
6 TABLET ORAL ONCE
Qty: 0 | Refills: 0 | Status: COMPLETED | OUTPATIENT
Start: 2017-10-15 | End: 2017-10-15

## 2017-10-15 RX ADMIN — PANTOPRAZOLE SODIUM 40 MILLIGRAM(S): 20 TABLET, DELAYED RELEASE ORAL at 05:31

## 2017-10-15 RX ADMIN — OXYCODONE HYDROCHLORIDE 5 MILLIGRAM(S): 5 TABLET ORAL at 19:53

## 2017-10-15 RX ADMIN — OXYCODONE HYDROCHLORIDE 10 MILLIGRAM(S): 5 TABLET ORAL at 13:59

## 2017-10-15 RX ADMIN — MIRTAZAPINE 7.5 MILLIGRAM(S): 45 TABLET, ORALLY DISINTEGRATING ORAL at 21:25

## 2017-10-15 RX ADMIN — OXYCODONE HYDROCHLORIDE 10 MILLIGRAM(S): 5 TABLET ORAL at 05:31

## 2017-10-15 RX ADMIN — OXYCODONE HYDROCHLORIDE 10 MILLIGRAM(S): 5 TABLET ORAL at 22:00

## 2017-10-15 RX ADMIN — OXYCODONE HYDROCHLORIDE 10 MILLIGRAM(S): 5 TABLET ORAL at 21:25

## 2017-10-15 RX ADMIN — OXYCODONE HYDROCHLORIDE 10 MILLIGRAM(S): 5 TABLET ORAL at 06:05

## 2017-10-15 RX ADMIN — LISINOPRIL 5 MILLIGRAM(S): 2.5 TABLET ORAL at 05:30

## 2017-10-15 RX ADMIN — OXYCODONE HYDROCHLORIDE 10 MILLIGRAM(S): 5 TABLET ORAL at 13:29

## 2017-10-15 RX ADMIN — Medication 100 MILLIGRAM(S): at 05:30

## 2017-10-15 RX ADMIN — Medication 81 MILLIGRAM(S): at 13:29

## 2017-10-15 RX ADMIN — Medication 100 MILLIGRAM(S): at 13:29

## 2017-10-15 RX ADMIN — WARFARIN SODIUM 6 MILLIGRAM(S): 2.5 TABLET ORAL at 21:25

## 2017-10-15 RX ADMIN — OXYCODONE HYDROCHLORIDE 5 MILLIGRAM(S): 5 TABLET ORAL at 20:43

## 2017-10-15 RX ADMIN — Medication 25 MILLIGRAM(S): at 05:30

## 2017-10-15 NOTE — PROGRESS NOTE ADULT - SUBJECTIVE AND OBJECTIVE BOX
Subjective: No current complaints.     Medications:  aspirin enteric coated 81 milliGRAM(s) Oral daily  docusate sodium 100 milliGRAM(s) Oral three times a day  glycerin Suppository - Adult 1 Suppository(s) Rectal every 4 hours  lidocaine   Patch 1 Patch Transdermal daily  lidocaine   Patch 1 Patch Transdermal every 24 hours  lisinopril 5 milliGRAM(s) Oral daily  metoprolol succinate ER 25 milliGRAM(s) Oral daily  mirtazapine 7.5 milliGRAM(s) Oral at bedtime  naloxone Injectable 0.1 milliGRAM(s) IV Push every 3 minutes PRN  oxyCODONE    IR 5 milliGRAM(s) Oral every 4 hours PRN  oxyCODONE  ER Tablet 10 milliGRAM(s) Oral every 8 hours  pantoprazole    Tablet 40 milliGRAM(s) Oral before breakfast  phenylephrine 0.25% Suppository 1 Suppository(s) Rectal every 8 hours PRN  polyethylene glycol 3350 17 Gram(s) Oral at bedtime  senna 3 Tablet(s) Oral at bedtime  warfarin 6 milliGRAM(s) Oral once      Physical Exam:    Vital Signs Last 24 Hrs  T(C): 36.8 (15 Oct 2017 18:00), Max: 36.9 (14 Oct 2017 22:00)  T(F): 98.2 (15 Oct 2017 18:00), Max: 98.4 (14 Oct 2017 22:00)  HR: 107 (15 Oct 2017 18:00) (89 - 107)  BP(mean): 66 (15 Oct 2017 18:00) (66 - 78)  RR: 18 (15 Oct 2017 18:00) (18 - 18)  SpO2: 99% (15 Oct 2017 18:00) (99% - 100%)     Daily     I&O's Summary    14 Oct 2017 07:01  -  15 Oct 2017 07:00  --------------------------------------------------------  IN: 390 mL / OUT: 750 mL / NET: -360 mL    15 Oct 2017 07:01  -  15 Oct 2017 21:14  --------------------------------------------------------  IN: 0 mL / OUT: 600 mL / NET: -600 mL    General: No distress. Comfortable.  HEENT: EOM intact.  Neck: Neck supple. JVP not elevated. No masses  Chest: Clear to auscultation bilaterally  CV: Typical LVAD sounds. No distal pulses  Abdomen: Soft, non-distended, non-tender  Driveline exit site: Clean, dry, and intact  Skin: No rashes or skin breakdown  Neurology: Alert and oriented times three. Sensation intact  Psych: Affect normal    LVAD Interrogation: Heart Mate II  RPMs: 9000  Power: 5.3  Flow: 5.3  PI: 5.2   Event: None  No programming changes were made    Labs:                        9.5    8.7   )-----------( 238      ( 15 Oct 2017 05:35 )             27.6     10-15    135  |  96  |  12  ----------------------------<  91  4.3   |  28  |  0.58    Ca    9.3      15 Oct 2017 05:35    PT/INR - ( 15 Oct 2017 13:35 )   PT: 21.8 sec;   INR: 1.99 ratio      PTT - ( 15 Oct 2017 13:35 )  PTT:33.7 sec    Lactate Dehydrogenase, Serum: 451 U/L (10-15 @ 05:35)  Lactate Dehydrogenase, Serum: 374 U/L (10-14 @ 05:46)  Lactate Dehydrogenase, Serum: 329 U/L (10-13 @ 05:19)

## 2017-10-15 NOTE — PROGRESS NOTE ADULT - PROBLEM SELECTOR PLAN 1
- Continue with ASA     - Daily LDH   - Continue with AC therapy on coumadin for a therapeutic INR goal 2-3  possible hep gtt if INR nor increased    - D/C plan Rehab  when ins auth received  hopeful  monday

## 2017-10-15 NOTE — PROGRESS NOTE ADULT - SUBJECTIVE AND OBJECTIVE BOX
VITAL SIGNS    Telemetry:   NSR 90 PVc    Vital Signs Last 24 Hrs  T(C): 36.8 (10-15-17 @ 10:00), Max: 36.9 (10-14-17 @ 22:00)  T(F): 98.2 (10-15-17 @ 10:00), Max: 98.4 (10-14-17 @ 22:00)  HR: 107 (10-15-17 @ 10:00) (89 - 107)  BP: --  RR: 18 (10-15-17 @ 10:00) (18 - 18)  SpO2: 100% (10-15-17 @ 10:00) (99% - 100%)           Daily         CAPILLARY BLOOD GLUCOSE          Coumadin    [x ] YES             Reason:    lvad                     PHYSICAL EXAM    Neurology: alert and oriented x 3, moves all extremities with no defecits  CV :  RRR  Sternal Wound :  CDI , Stable drive line intact  Lungs:   CTA B/L  Abdomen: soft, nontender, nondistended, positive bowel sounds, last bowel movement 10/14  Extremities:     cachetic   no edema

## 2017-10-15 NOTE — PROGRESS NOTE ADULT - ASSESSMENT
62 year old man who presented with abdominal pain and was found to be in low output heart failure with cardiogenic shock (EF 10%). He underwent HM2 LVAD implantation with TV annuloplasty ring on 9/12. Hospital course complicated by left sided flank pain, which has significantly improved. Currently euvolemic and normotensive with MAP at goal. Discharge has been complicated by lack of rehab options, which we are investigating.    Discussed with [facility] team. Please call me with questions at [phone].

## 2017-10-15 NOTE — PROGRESS NOTE ADULT - PROBLEM SELECTOR PLAN 2
Daily LDH  coumadin dose to 6 mg for goal INR of 2-2.5  continue current dose of aspirin at 81 mg daily

## 2017-10-15 NOTE — PROGRESS NOTE ADULT - ASSESSMENT
63 y/o Creole speaking male  presented with abdominal pain and  found to be in low output heart failure with cardiogenic shock (EF 10%) s/p 9/12 LVAD/TV annuloplasty for INTERMACS 2   Postop Course:   Acute blood loss anemia 2/2 blood loss postop -->requiring PRBC, amicar, PLT, cryo.  TTE 9/13 LVEDD 5.9 cm, mild-mod MR, aortic valve closed, mild RV dysfunction, mild TR.   c/o L sided pain; found to have L hemithorax s/p pigtail that was removed; fluid c/w exudative with lot of RBC,   (+) Hyponatremia - renal consulted- urine studies sent / Endocrine following for SIADH work up  10/6 Random Urine: 49; Patient euthyroid and not adrenally insufficient -->Patient dx with SIADH, on fluid restriction.  (+) depressive mood --> psych following; Remeron initiated; started at 7.5 mg HS; avoid up titrating 2/2 risk of worsening hyponatremia    LVAD interrogated w/o events.   10/9 Small amount of rectal bleeding after bowel movement; (+) internal hemorrhoids --> Continue bowel regimen / Start Preparation H suppository TID PRN.   No further bleeding noted. pt ambulating with a walker.  Psych follow up pending  Lisinopril d/c yesterday for hypotension, stable  Pain meds increased by palliative  10/12  coumadin dosing, na improving 134, ENDO for siadh, old ivl out refusing new ivl  10/13   coumadin dosing,   lisinopril added per HF  10/14   VSS   ambulating  10/15  VSS   INR down to 1.78   repat INR this afternoon   ?Hep GTT

## 2017-10-15 NOTE — PROGRESS NOTE ADULT - SUBJECTIVE AND OBJECTIVE BOX
VITAL SIGNS    Telemetry:    NSR  90  Vital Signs Last 24 Hrs  T(C): 36.9 (10-15-17 @ 06:00), Max: 36.9 (10-14-17 @ 22:00)  T(F): 98.4 (10-15-17 @ 06:00), Max: 98.4 (10-14-17 @ 22:00)  HR: 89 (10-15-17 @ 06:00) (89 - 103)  BP: --  RR: 18 (10-15-17 @ 06:00) (18 - 18)  SpO2: 99% (10-15-17 @ 06:00) (99% - 100%)           Daily         CAPILLARY BLOOD GLUCOSE         Drains:    out    Coumadin    [x ] YES            Reason:   lvad                      PHYSICAL EXAM    Neurology: alert and oriented x 3, moves all extremities with no defecits  CV :  RRR  Sternal Wound :  CDI , Stable  Lungs:   CTA B/L  Abdomen: soft, nontender, nondistended, positive bowel sounds, last bowel movement   Extremities:

## 2017-10-15 NOTE — PROGRESS NOTE ADULT - SUBJECTIVE AND OBJECTIVE BOX
Chief complaint  Patient is a 62y old  Male who presents with a chief complaint of SOOB (25 Aug 2017 22:27)   Review of systems  Patient in bed, comfortable, no fever, no hypoglycemia.    Labs and Fingersticks    CAPILLARY BLOOD GLUCOSE    Anion Gap, Serum: 11 (10-15 @ 05:35)  Anion Gap, Serum: 11 (10-14 @ 05:46)      Calcium, Total Serum: 9.3 (10-15 @ 05:35)  Calcium, Total Serum: 9.2 (10-14 @ 05:46)          10-15    135  |  96  |  12  ----------------------------<  91  4.3   |  28  |  0.58    Ca    9.3      15 Oct 2017 05:35                          9.5    8.7   )-----------( 238      ( 15 Oct 2017 05:35 )             27.6     Medications  MEDICATIONS  (STANDING):  aspirin enteric coated 81 milliGRAM(s) Oral daily  docusate sodium 100 milliGRAM(s) Oral three times a day  glycerin Suppository - Adult 1 Suppository(s) Rectal every 4 hours  lidocaine   Patch 1 Patch Transdermal daily  lidocaine   Patch 1 Patch Transdermal every 24 hours  lisinopril 5 milliGRAM(s) Oral daily  metoprolol succinate ER 25 milliGRAM(s) Oral daily  mirtazapine 7.5 milliGRAM(s) Oral at bedtime  oxyCODONE  ER Tablet 10 milliGRAM(s) Oral every 8 hours  pantoprazole    Tablet 40 milliGRAM(s) Oral before breakfast  polyethylene glycol 3350 17 Gram(s) Oral at bedtime  senna 3 Tablet(s) Oral at bedtime      Physical Exam  General: Patient comfortable in bed  Vital Signs Last 12 Hrs  T(F): 98.2 (10-15-17 @ 18:00), Max: 98.2 (10-15-17 @ 18:00)  HR: 107 (10-15-17 @ 18:00) (97 - 107)  BP: --  BP(mean): 66 (10-15-17 @ 18:00) (66 - 68)  RR: 18 (10-15-17 @ 18:00) (18 - 18)  SpO2: 99% (10-15-17 @ 18:00) (99% - 99%)  Neck: No palpable thyroid nodules.  CVS: S1S2, No murmurs  Respiratory: No wheezing, no crepitations  GI: Abdomen soft, bowel sounds positive  Musculoskeletal: Positive edema lower extremities bilaterally  Skin: No skin rashes, no ecchymosis    Diagnostics    Osmolality, Random Urine: Routine  Cancel Reason: Lab Operations Cancel (10-06 @ 10:48)  Osmolality, Serum: Routine (10-06 @ 10:48)  Sodium, Random Urine: Routine (10-06 @ 10:48)

## 2017-10-15 NOTE — PROGRESS NOTE ADULT - PROBLEM SELECTOR PLAN 1
- Continue with ASA     - Daily LDH   - Continue with AC therapy on coumadin for a therapeutic INR goal 2-3    - D/C plan Rehab  when ins auth received  hopeful  monday

## 2017-10-15 NOTE — PROGRESS NOTE ADULT - ASSESSMENT
63 y/o Creole speaking male  presented with abdominal pain and  found to be in low output heart failure with cardiogenic shock (EF 10%) s/p 9/12 LVAD/TV annuloplasty for INTERMACS 2   Postop Course:   Acute blood loss anemia 2/2 blood loss postop -->requiring PRBC, amicar, PLT, cryo.  TTE 9/13 LVEDD 5.9 cm, mild-mod MR, aortic valve closed, mild RV dysfunction, mild TR.   c/o L sided pain; found to have L hemithorax s/p pigtail that was removed; fluid c/w exudative with lot of RBC,   (+) Hyponatremia - renal consulted- urine studies sent / Endocrine following for SIADH work up  10/6 Random Urine: 49; Patient euthyroid and not adrenally insufficient -->Patient dx with SIADH, on fluid restriction.  (+) depressive mood --> psych following; Remeron initiated; started at 7.5 mg HS; avoid up titrating 2/2 risk of worsening hyponatremia    LVAD interrogated w/o events.   10/9 Small amount of rectal bleeding after bowel movement; (+) internal hemorrhoids --> Continue bowel regimen / Start Preparation H suppository TID PRN.   No further bleeding noted. pt ambulating with a walker.  Psych follow up pending  Lisinopril d/c yesterday for hypotension, stable  Pain meds increased by palliative  10/12  coumadin dosing, na improving 134, ENDO for siadh, old ivl out refusing new ivl  10/13   coumadin dosing,   lisinopril added per HF  10/14   VSS   ambulating  10/15

## 2017-10-16 LAB
ANION GAP SERPL CALC-SCNC: 11 MMOL/L — SIGNIFICANT CHANGE UP (ref 5–17)
APTT BLD: 34.7 SEC — SIGNIFICANT CHANGE UP (ref 27.5–37.4)
BUN SERPL-MCNC: 18 MG/DL — SIGNIFICANT CHANGE UP (ref 7–23)
CALCIUM SERPL-MCNC: 9.7 MG/DL — SIGNIFICANT CHANGE UP (ref 8.4–10.5)
CHLORIDE SERPL-SCNC: 94 MMOL/L — LOW (ref 96–108)
CO2 SERPL-SCNC: 28 MMOL/L — SIGNIFICANT CHANGE UP (ref 22–31)
CREAT SERPL-MCNC: 0.76 MG/DL — SIGNIFICANT CHANGE UP (ref 0.5–1.3)
GLUCOSE SERPL-MCNC: 104 MG/DL — HIGH (ref 70–99)
HCT VFR BLD CALC: 33.7 % — LOW (ref 39–50)
HGB BLD-MCNC: 11.3 G/DL — LOW (ref 13–17)
INR BLD: 2.05 RATIO — HIGH (ref 0.88–1.16)
LDH SERPL L TO P-CCNC: 379 U/L — HIGH (ref 50–242)
MCHC RBC-ENTMCNC: 32.4 PG — SIGNIFICANT CHANGE UP (ref 27–34)
MCHC RBC-ENTMCNC: 33.4 GM/DL — SIGNIFICANT CHANGE UP (ref 32–36)
MCV RBC AUTO: 97 FL — SIGNIFICANT CHANGE UP (ref 80–100)
PLATELET # BLD AUTO: 276 K/UL — SIGNIFICANT CHANGE UP (ref 150–400)
POTASSIUM SERPL-MCNC: 4.7 MMOL/L — SIGNIFICANT CHANGE UP (ref 3.5–5.3)
POTASSIUM SERPL-SCNC: 4.7 MMOL/L — SIGNIFICANT CHANGE UP (ref 3.5–5.3)
PROTHROM AB SERPL-ACNC: 22.7 SEC — HIGH (ref 9.8–12.7)
RBC # BLD: 3.48 M/UL — LOW (ref 4.2–5.8)
RBC # FLD: 13.5 % — SIGNIFICANT CHANGE UP (ref 10.3–14.5)
SODIUM SERPL-SCNC: 133 MMOL/L — LOW (ref 135–145)
WBC # BLD: 9.4 K/UL — SIGNIFICANT CHANGE UP (ref 3.8–10.5)
WBC # FLD AUTO: 9.4 K/UL — SIGNIFICANT CHANGE UP (ref 3.8–10.5)

## 2017-10-16 RX ORDER — WARFARIN SODIUM 2.5 MG/1
5 TABLET ORAL ONCE
Qty: 0 | Refills: 0 | Status: DISCONTINUED | OUTPATIENT
Start: 2017-10-16 | End: 2017-10-16

## 2017-10-16 RX ORDER — WARFARIN SODIUM 2.5 MG/1
5 TABLET ORAL ONCE
Qty: 0 | Refills: 0 | Status: COMPLETED | OUTPATIENT
Start: 2017-10-16 | End: 2017-10-16

## 2017-10-16 RX ADMIN — OXYCODONE HYDROCHLORIDE 10 MILLIGRAM(S): 5 TABLET ORAL at 07:00

## 2017-10-16 RX ADMIN — OXYCODONE HYDROCHLORIDE 5 MILLIGRAM(S): 5 TABLET ORAL at 09:51

## 2017-10-16 RX ADMIN — OXYCODONE HYDROCHLORIDE 10 MILLIGRAM(S): 5 TABLET ORAL at 06:25

## 2017-10-16 RX ADMIN — OXYCODONE HYDROCHLORIDE 5 MILLIGRAM(S): 5 TABLET ORAL at 09:21

## 2017-10-16 RX ADMIN — Medication 25 MILLIGRAM(S): at 06:25

## 2017-10-16 RX ADMIN — WARFARIN SODIUM 5 MILLIGRAM(S): 2.5 TABLET ORAL at 21:27

## 2017-10-16 RX ADMIN — OXYCODONE HYDROCHLORIDE 10 MILLIGRAM(S): 5 TABLET ORAL at 12:25

## 2017-10-16 RX ADMIN — PANTOPRAZOLE SODIUM 40 MILLIGRAM(S): 20 TABLET, DELAYED RELEASE ORAL at 06:25

## 2017-10-16 RX ADMIN — OXYCODONE HYDROCHLORIDE 10 MILLIGRAM(S): 5 TABLET ORAL at 12:55

## 2017-10-16 RX ADMIN — Medication 100 MILLIGRAM(S): at 12:24

## 2017-10-16 RX ADMIN — LIDOCAINE 1 PATCH: 4 CREAM TOPICAL at 12:23

## 2017-10-16 RX ADMIN — Medication 81 MILLIGRAM(S): at 12:24

## 2017-10-16 RX ADMIN — LISINOPRIL 5 MILLIGRAM(S): 2.5 TABLET ORAL at 06:25

## 2017-10-16 RX ADMIN — MIRTAZAPINE 7.5 MILLIGRAM(S): 45 TABLET, ORALLY DISINTEGRATING ORAL at 21:24

## 2017-10-16 RX ADMIN — Medication 100 MILLIGRAM(S): at 06:25

## 2017-10-16 NOTE — PROGRESS NOTE ADULT - SUBJECTIVE AND OBJECTIVE BOX
Behavioral Cardiology Progress Note     HPI: Mr. Quispe is a 62 year old man with no PMH, found to have a nonischemic cardiomyopathy with ACC/AHA stage D congestive heart failure who underwent LVAD/TV annuloplasty for INTERMACS 2 on 9/12/17.  Hospital course complicated by left sided flank pain, which has significantly improved. Currently euvolemic and normotensive with MAP at goal. Discharge has been complicated by lack of rehab options, which we are investigating.    Behavioral Health Assessment:     Mood:  frustrated and irritable          Current stressors:   Adjustment to LVAD   Hospitalization      Support system/family support: Family support (family members -sister and her family) are currently in [facility] until 10/22/17.  Pt’s niece is an RN at [employer].       Coping strategies:  Limited; reports he tends to keep things to himself, reluctant to ask others for help.        Understanding of medical illness and treatment plan: Continues with process of LVAD education with LVAD coordinator.        MSE: Seen sitting in chair. A&Ox3.  Poorly related with minimal eye contact, eyes closed at times. Thought process goal directed.  No abnormal thought content, denies s/i.  Mood: dysphoric.  Affect irritable. Insight and judgment adequate.

## 2017-10-16 NOTE — PROGRESS NOTE ADULT - PROBLEM SELECTOR PLAN 1
- Continue with ASA   - Daily LDH   - Continue with AC therapy on coumadin for a therapeutic INR goal 2-3    D/C plan Rehab vs home - await placement Continue with ASA   meds as per CHF team   Daily LDH   Continue with AC therapy on coumadin for a therapeutic INR goal 2-3    D/C plan Rehab vs home - await placement

## 2017-10-16 NOTE — PROGRESS NOTE ADULT - PROBLEM SELECTOR PLAN 2
Daily LDH  coumadin dose to 6 mg for goal INR of 2-2.5  continue current dose of aspirin at 81 mg daily.

## 2017-10-16 NOTE — PROGRESS NOTE ADULT - ASSESSMENT
Mood sad and frustrated.   Expressing irritability with extended hospitalization and no clear answer on whether he is going to rehab.  Discussed with CM who has been working on insurance issues.  Ambulating in antonio multiple times per day without distress.  Appetite ok but does not like hospital food.   Looking forward to going home and preparing his own meals.   Provided validation and support while patient discussed multiple annoyances.  At end of discussion was more calm.  Sleep improved.  Taking Remeron 7.5mg at bedtime.       Dx: Adjustment disorder with depression     Recommendations:  Support and validation   Will benefit from additional coping strategies to manage current stressors   Behavioral Cardiology will continue to follow

## 2017-10-16 NOTE — PROGRESS NOTE ADULT - ASSESSMENT
62 year old man who presented with abdominal pain and was found to be in low output heart failure with cardiogenic shock (EF 10%). He underwent HM2 LVAD implantation with TV annuloplasty ring on 9/12. Hospital course complicated by left sided flank pain, which has significantly improved. Currently euvolemic and normotensive with MAP at goal. Discharge has been complicated by lack of rehab options, which we are investigating.

## 2017-10-16 NOTE — PROGRESS NOTE ADULT - PROBLEM SELECTOR PLAN 2
- Continue with Toprol XL 25 qd/ Lisinopril  - Continue with AC therapy on coumadin for a therapeutic INR goal 2-3 Continue with Toprol XL 25 qd/ Lisinopril  Continue with AC therapy on coumadin for a therapeutic INR goal 2-3

## 2017-10-16 NOTE — PROGRESS NOTE ADULT - SUBJECTIVE AND OBJECTIVE BOX
Patient is a 62y old  Male who presents with a chief complaint of SOOB (25 Aug 2017 22:27)    feeling frustrated  no chest pain or SOB   no cough   Any change in ROS:     MEDICATIONS  (STANDING):  aspirin enteric coated 81 milliGRAM(s) Oral daily  docusate sodium 100 milliGRAM(s) Oral three times a day  glycerin Suppository - Adult 1 Suppository(s) Rectal every 4 hours  lidocaine   Patch 1 Patch Transdermal daily  lidocaine   Patch 1 Patch Transdermal every 24 hours  lisinopril 5 milliGRAM(s) Oral daily  metoprolol succinate ER 25 milliGRAM(s) Oral daily  mirtazapine 7.5 milliGRAM(s) Oral at bedtime  oxyCODONE  ER Tablet 10 milliGRAM(s) Oral every 8 hours  pantoprazole    Tablet 40 milliGRAM(s) Oral before breakfast  polyethylene glycol 3350 17 Gram(s) Oral at bedtime  senna 3 Tablet(s) Oral at bedtime    MEDICATIONS  (PRN):  naloxone Injectable 0.1 milliGRAM(s) IV Push every 3 minutes PRN Sedation and respiratory depression RR < 11  oxyCODONE    IR 5 milliGRAM(s) Oral every 4 hours PRN Mild, Moderate, Severe pain  phenylephrine 0.25% Suppository 1 Suppository(s) Rectal every 8 hours PRN Hemorrhoids    Vital Signs Last 24 Hrs  T(C): 36.7 (16 Oct 2017 10:00), Max: 37.1 (16 Oct 2017 02:00)  T(F): 98 (16 Oct 2017 10:00), Max: 98.7 (16 Oct 2017 02:00)  HR: 110 (16 Oct 2017 10:00) (97 - 110)  BP: --  BP(mean): 76 (16 Oct 2017 10:00) (66 - 80)  RR: 18 (16 Oct 2017 10:00) (17 - 18)  SpO2: 99% (16 Oct 2017 10:00) (98% - 99%)    I&O's Summary    15 Oct 2017 07:01  -  16 Oct 2017 07:00  --------------------------------------------------------  IN: 700 mL / OUT: 1400 mL / NET: -700 mL    16 Oct 2017 07:01  -  16 Oct 2017 10:59  --------------------------------------------------------  IN: 320 mL / OUT: 250 mL / NET: 70 mL          Physical Exam:   GENERAL: cachectic   HEENT: ALONSO/   Atraumatic, Normocephalic  ENMT: No tonsillar erythema, exudates, or enlargement; Moist mucous membranes, Good dentition, No lesions  NECK: Supple, No JVD, Normal thyroid  CHEST/LUNG: Clear to auscultaion, ; No rales, rhonchi, wheezing, or rubs  CVS: Regular rate and rhythm; No murmurs, rubs, or gallops  GI: : Soft, Nontender, Nondistended; Bowel sounds present  NERVOUS SYSTEM:  Alert & Oriented X3  EXTREMITIES:  2+ Peripheral Pulses, No clubbing, cyanosis, or edema  LYMPH: No lymphadenopathy noted  SKIN: No rashes or lesions  ENDOCRINOLOGY: No Thyromegaly  PSYCH: Appropriate    Labs:                              9.5    8.7   )-----------( 238      ( 15 Oct 2017 05:35 )             27.6                         9.5    8.8   )-----------( 243      ( 14 Oct 2017 05:14 )             27.9                         9.4    7.8   )-----------( 241      ( 13 Oct 2017 05:19 )             26.9     10-15    135  |  96  |  12  ----------------------------<  91  4.3   |  28  |  0.58  10-14    136  |  97  |  14  ----------------------------<  97  4.8   |  28  |  0.63  10-13    135  |  96  |  15  ----------------------------<  98  4.2   |  27  |  0.64    Ca    9.3      15 Oct 2017 05:35      CAPILLARY BLOOD GLUCOSE            PT/INR - ( 15 Oct 2017 13:35 )   PT: 21.8 sec;   INR: 1.99 ratio         PTT - ( 15 Oct 2017 13:35 )  PTT:33.7 sec    Cultures:       < from: Xray Chest 1 View AP- PORTABLE-Urgent (09.29.17 @ 16:37) >          INTERPRETATION:  AP chest with comparison to 1347 hours from the same day.    IMPRESSION: Left pigtail catheter removed.    IMPRESSION: Left-sided pigtail catheter has been removed. There is no   pneumothorax. The heart is normal in size. The patient is status post   median sternotomy. The lungs are clear.                    TIA BENAVIDES M.D., ATTENDING RADIOLOGIST  This document has been electronically signed. Sep 30 2017  4:18PM        < end of copied text >              < from: Xray Chest 1 View AP- PORTABLE-Urgent (09.29.17 @ 16:37) >  ROCEDURE DATE:  09/29/2017            INTERPRETATION:  AP chest with comparison to 1347 hours from the same day.    IMPRESSION: Left pigtail catheter removed.    IMPRESSION: Left-sided pigtail catheter has been removed. There is no   pneumothorax. The heart is normal in size. The patient is status post   median sternotomy. The lungs are clear.                    TIA BENAVIDES M.D., ATTENDING RADIOLOGIST  This document has been electronically signed. Sep 30 2017  4:18PM    < end of copied text >            Studies  Chest X-RAY  CT SCAN Chest   Venous Dopplers: LE:   CT Abdomen  Others

## 2017-10-16 NOTE — PROGRESS NOTE ADULT - SUBJECTIVE AND OBJECTIVE BOX
VITAL SIGNS    Telemetry:    Vital Signs Last 24 Hrs  T(C): 36.7 (10-16-17 @ 10:00), Max: 37.1 (10-16-17 @ 02:00)  T(F): 98 (10-16-17 @ 10:00), Max: 98.7 (10-16-17 @ 02:00)  HR: 110 (10-16-17 @ 10:00) (97 - 110)  BP: --  RR: 18 (10-16-17 @ 10:00) (17 - 18)  SpO2: 99% (10-16-17 @ 10:00) (98% - 99%)            10-15 @ 07:01  -  10-16 @ 07:00  --------------------------------------------------------  IN: 700 mL / OUT: 1400 mL / NET: -700 mL    10-16 @ 07:01  -  10-16 @ 13:24  --------------------------------------------------------  IN: 320 mL / OUT: 250 mL / NET: 70 mL       Daily     Daily   Admit Wt: Drug Dosing Weight  Height (cm): 177.8 (31 Aug 2017 18:00)  Weight (kg): 55 (12 Oct 2017 10:45)  BMI (kg/m2): 17.4 (12 Oct 2017 10:45)  BSA (m2): 1.69 (12 Oct 2017 10:45)      CAPILLARY BLOOD GLUCOSE              aspirin enteric coated 81 milliGRAM(s) Oral daily  docusate sodium 100 milliGRAM(s) Oral three times a day  glycerin Suppository - Adult 1 Suppository(s) Rectal every 4 hours  lidocaine   Patch 1 Patch Transdermal daily  lidocaine   Patch 1 Patch Transdermal every 24 hours  lisinopril 5 milliGRAM(s) Oral daily  metoprolol succinate ER 25 milliGRAM(s) Oral daily  mirtazapine 7.5 milliGRAM(s) Oral at bedtime  naloxone Injectable 0.1 milliGRAM(s) IV Push every 3 minutes PRN  oxyCODONE    IR 5 milliGRAM(s) Oral every 4 hours PRN  oxyCODONE  ER Tablet 10 milliGRAM(s) Oral every 8 hours  pantoprazole    Tablet 40 milliGRAM(s) Oral before breakfast  phenylephrine 0.25% Suppository 1 Suppository(s) Rectal every 8 hours PRN  polyethylene glycol 3350 17 Gram(s) Oral at bedtime  senna 3 Tablet(s) Oral at bedtime      PHYSICAL EXAM    Subjective: "Hi.   Neurology: alert and oriented x 3, nonfocal, no gross deficits  CV : tele:  RSR  Sternal Wound :  CDI with dressing , Stable  Lungs: clear. RR easy, unlabored   Abdomen: soft, nontender, nondistended, positive bowel sounds, bowel movement   Neg N/V/D   :  pt voiding without difficulty   Extremities:   BLANDON; edema, neg calf tenderness.   PPP bilaterally VITAL SIGNS    Telemetry:  rsr  with 16 bts wct overnight   Vital Signs Last 24 Hrs  T(C): 36.7 (10-16-17 @ 10:00), Max: 37.1 (10-16-17 @ 02:00)  T(F): 98 (10-16-17 @ 10:00), Max: 98.7 (10-16-17 @ 02:00)  HR: 110 (10-16-17 @ 10:00) (97 - 110)  BP: --  RR: 18 (10-16-17 @ 10:00) (17 - 18)  SpO2: 99% (10-16-17 @ 10:00) (98% - 99%)            10-15 @ 07:01  -  10-16 @ 07:00  --------------------------------------------------------  IN: 700 mL / OUT: 1400 mL / NET: -700 mL    10-16 @ 07:01  -  10-16 @ 13:24  --------------------------------------------------------  IN: 320 mL / OUT: 250 mL / NET: 70 mL       Daily     Daily   Admit Wt: Drug Dosing Weight  Height (cm): 177.8 (31 Aug 2017 18:00)  Weight (kg): 55 (12 Oct 2017 10:45)  BMI (kg/m2): 17.4 (12 Oct 2017 10:45)  BSA (m2): 1.69 (12 Oct 2017 10:45)    aspirin enteric coated 81 milliGRAM(s) Oral daily  docusate sodium 100 milliGRAM(s) Oral three times a day  glycerin Suppository - Adult 1 Suppository(s) Rectal every 4 hours  lidocaine   Patch 1 Patch Transdermal daily  lidocaine   Patch 1 Patch Transdermal every 24 hours  lisinopril 5 milliGRAM(s) Oral daily  metoprolol succinate ER 25 milliGRAM(s) Oral daily  mirtazapine 7.5 milliGRAM(s) Oral at bedtime  naloxone Injectable 0.1 milliGRAM(s) IV Push every 3 minutes PRN  oxyCODONE    IR 5 milliGRAM(s) Oral every 4 hours PRN  oxyCODONE  ER Tablet 10 milliGRAM(s) Oral every 8 hours  pantoprazole    Tablet 40 milliGRAM(s) Oral before breakfast  phenylephrine 0.25% Suppository 1 Suppository(s) Rectal every 8 hours PRN  polyethylene glycol 3350 17 Gram(s) Oral at bedtime  senna 3 Tablet(s) Oral at bedtime      PHYSICAL EXAM    Subjective: "I'm ok."   Neurology: alert and oriented x 3, nonfocal, no gross deficits  CV : tele:  rsr  with 16 bts wct overnight   Sternal Wound :  CDI TAI   drive line cdi with dressing   Lungs: clear. RR easy, unlabored   Abdomen: soft, nontender, nondistended, positive bowel sounds,+  bowel movement   Neg N/V/D   :  pt voiding without difficulty   Extremities:   BLANDON; neg edema, neg calf tenderness.   PPP bilaterally

## 2017-10-16 NOTE — PROGRESS NOTE ADULT - SUBJECTIVE AND OBJECTIVE BOX
Interval Events:Pt admits to mild abdominal pain, denies n/v, tolerating PO    HPI:62M with no PMHx here with chest pain. Began last week, described as pleuritic, worsens with cough and deep inspiration. Has been having SOB as well, dry cough. Recently vacationed in [facility] came back today. Saw doctor in [facility] but unclear what workup was. No fever, sick contacts, abd pain. CP described as midsternal, nonradiating. Has 25 pack year smoking history.  CTPA does not reveal PE, Pulm congestion noted.       MEDICATIONS  (STANDING):  aspirin enteric coated 81 milliGRAM(s) Oral daily  docusate sodium 100 milliGRAM(s) Oral three times a day  glycerin Suppository - Adult 1 Suppository(s) Rectal every 4 hours  lidocaine   Patch 1 Patch Transdermal daily  lidocaine   Patch 1 Patch Transdermal every 24 hours  lisinopril 5 milliGRAM(s) Oral daily  metoprolol succinate ER 25 milliGRAM(s) Oral daily  mirtazapine 7.5 milliGRAM(s) Oral at bedtime  oxyCODONE  ER Tablet 10 milliGRAM(s) Oral every 8 hours  pantoprazole    Tablet 40 milliGRAM(s) Oral before breakfast  polyethylene glycol 3350 17 Gram(s) Oral at bedtime  senna 3 Tablet(s) Oral at bedtime    MEDICATIONS  (PRN):  naloxone Injectable 0.1 milliGRAM(s) IV Push every 3 minutes PRN Sedation and respiratory depression RR < 11  oxyCODONE    IR 5 milliGRAM(s) Oral every 4 hours PRN Mild, Moderate, Severe pain  phenylephrine 0.25% Suppository 1 Suppository(s) Rectal every 8 hours PRN Hemorrhoids      Allergies    No Known Allergies    Intolerances        Review of Systems:    General:  No wt loss, fevers, chills, night sweats, fatigue,   Eyes:  Good vision, no reported pain  ENT:  No sore throat, pain, runny nose, dysphagia  CV:  No pain, palpitations, hypo/hypertension  Resp:  No dyspnea, cough, tachypnea, wheezing  GI:  mild diffuse abdominal pain, No nausea, No vomiting, No diarrhea, No constipation, No weight loss, No fever, No pruritis, No rectal bleeding, No tarry stools, No dysphagia,  :  No pain, bleeding, incontinence, nocturia  Muscle:  No pain, weakness  Neuro:  No weakness, tingling, memory problems  Psych:  No fatigue, insomnia, mood problems, depression  Endocrine:  No polyuria, polydipsia, cold/heat intolerance  Heme:  No petechiae, ecchymosis, easy bruisability  Skin:  No rash, tattoos, scars, edema    Vital Signs Last 24 Hrs  T(C): 36.7 (16 Oct 2017 10:00), Max: 37.1 (16 Oct 2017 02:00)  T(F): 98 (16 Oct 2017 10:00), Max: 98.7 (16 Oct 2017 02:00)  HR: 110 (16 Oct 2017 10:00) (97 - 110)  BP: --  BP(mean): 76 (16 Oct 2017 10:00) (66 - 80)  RR: 18 (16 Oct 2017 10:00) (17 - 18)  SpO2: 99% (16 Oct 2017 10:00) (98% - 99%)    PHYSICAL EXAM:    GENERAL:  Appears stated age, well-groomed, well-nourished, no distress  HEENT:  NC/AT,  conjunctivae clear and pink, no thyromegaly, nodules, adenopathy, no JVD, sclera -anicteric  CHEST:  Full & symmetric excursion, no increased effort, breath sounds clear  HEART:  Regular rhythm, S1, S2, no murmur/rub/S3/S4, no abdominal bruit, no edema  ABDOMEN:  Soft, mild diffuse tenderness, non-distended, normoactive bowel sounds,  no masses ,no hepato-splenomegaly, no signs of chronic liver disease  EXTREMITIES:  no cyanosis, clubbing or edema  SKIN:  No rash/erythema/ecchymoses/petechiae/wounds/abscess/warm/dry  NEURO:  Alert, oriented, no asterixis, no tremor, no encephalopathy      LABS:                        9.5    8.7   )-----------( 238      ( 15 Oct 2017 05:35 )             27.6     10-15    135  |  96  |  12  ----------------------------<  91  4.3   |  28  |  0.58    Ca    9.3      15 Oct 2017 05:35      PT/INR - ( 15 Oct 2017 13:35 )   PT: 21.8 sec;   INR: 1.99 ratio         PTT - ( 15 Oct 2017 13:35 )  PTT:33.7 sec      RADIOLOGY & ADDITIONAL TESTS:

## 2017-10-16 NOTE — PROGRESS NOTE ADULT - ASSESSMENT
63 y/o Creole speaking male  presented with abdominal pain and  found to be in low output heart failure with cardiogenic shock (EF 10%) s/p 9/12 LVAD/TV annuloplasty for INTERMACS 2   Postop Course:   Acute blood loss anemia 2/2 blood loss postop -->requiring PRBC, amicar, PLT, cryo.  TTE 9/13 LVEDD 5.9 cm, mild-mod MR, aortic valve closed, mild RV dysfunction, mild TR.   c/o L sided pain; found to have L hemithorax s/p pigtail that was removed; fluid c/w exudative with lot of RBC,   (+) Hyponatremia - renal consulted- urine studies sent / Endocrine following for SIADH work up  10/6 Random Urine: 49; Patient euthyroid and not adrenally insufficient -->Patient dx with SIADH, on fluid restriction.  (+) depressive mood --> psych following; Remeron initiated; started at 7.5 mg HS; avoid up titrating 2/2 risk of worsening hyponatremia    LVAD interrogated w/o events.   10/9 Small amount of rectal bleeding after bowel movement; (+) internal hemorrhoids --> Continue bowel regimen / Start Preparation H suppository TID PRN.   No further bleeding noted. pt ambulating with a walker.  Psych follow up pending  Lisinopril d/c yesterday for hypotension, stable  Pain meds increased by palliative  10/12  coumadin dosing, na improving 134, ENDO for siadh, old ivl out refusing new ivl  10/13   coumadin dosing,   lisinopril added per HF  10/14   VSS   ambulating  10/15  VSS   INR down to 1.78   repat INR this afternoon   ?Hep GTT  10/16 INR last evening 1.9 - no heparin gttp initiated; continue meds as per CHF team; vss; pt stable  discharge planning- ?placement 61 y/o Creole speaking male  presented with abdominal pain and  found to be in low output heart failure with cardiogenic shock (EF 10%) s/p 9/12 LVAD/TV annuloplasty for INTERMACS 2   Postop Course:   Acute blood loss anemia 2/2 blood loss postop -->requiring PRBC, amicar, PLT, cryo.  TTE 9/13 LVEDD 5.9 cm, mild-mod MR, aortic valve closed, mild RV dysfunction, mild TR.   c/o L sided pain; found to have L hemithorax s/p pigtail that was removed; fluid c/w exudative with lot of RBC,   (+) Hyponatremia - renal consulted- urine studies sent / Endocrine following for SIADH work up  10/6 Random Urine: 49; Patient euthyroid and not adrenally insufficient -->Patient dx with SIADH, on fluid restriction.  (+) depressive mood --> psych following; Remeron initiated; started at 7.5 mg HS; avoid up titrating 2/2 risk of worsening hyponatremia    LVAD interrogated w/o events.   10/9 Small amount of rectal bleeding after bowel movement; (+) internal hemorrhoids --> Continue bowel regimen / Start Preparation H suppository TID PRN.   No further bleeding noted. pt ambulating with a walker.  Psych follow up pending  Lisinopril d/c yesterday for hypotension, stable  Pain meds increased by palliative  10/12  coumadin dosing, na improving 134, ENDO for siadh, old ivl out refusing new ivl  10/13   coumadin dosing,   lisinopril added per HF  10/14   VSS   ambulating  10/15  VSS   INR down to 1.78   repat INR this afternoon   ?Hep GTT  10/16 INR > 2- no heparin gttp indicated.  continue meds as per CHF team; vss; pt stable; MAP 76   discharge planning- ?placement

## 2017-10-16 NOTE — PROGRESS NOTE ADULT - SUBJECTIVE AND OBJECTIVE BOX
HPI:  62M with no PMHx here with chest pain. Began last week, described as pleuritic, worsens with cough and deep inspiration. Has been having SOB as well, dry cough. Recently vacationed in [facility] came back today. Saw doctor in [facility] but unclear what workup was. No fever, sick contacts, abd pain. CP described as midsternal, nonradiating. Has 25 pack year smoking history.  CTPA does not reveal PE, Pulm congestion noted. (25 Aug 2017 22:27)    PMH:   No pertinent past medical history    PSH:   No significant past surgical history    Medications:   aspirin enteric coated 81 milliGRAM(s) Oral daily  docusate sodium 100 milliGRAM(s) Oral three times a day  glycerin Suppository - Adult 1 Suppository(s) Rectal every 4 hours  lidocaine   Patch 1 Patch Transdermal daily  lidocaine   Patch 1 Patch Transdermal every 24 hours  lisinopril 5 milliGRAM(s) Oral daily  metoprolol succinate ER 25 milliGRAM(s) Oral daily  mirtazapine 7.5 milliGRAM(s) Oral at bedtime  naloxone Injectable 0.1 milliGRAM(s) IV Push every 3 minutes PRN  oxyCODONE    IR 5 milliGRAM(s) Oral every 4 hours PRN  oxyCODONE  ER Tablet 10 milliGRAM(s) Oral every 8 hours  pantoprazole    Tablet 40 milliGRAM(s) Oral before breakfast  phenylephrine 0.25% Suppository 1 Suppository(s) Rectal every 8 hours PRN  polyethylene glycol 3350 17 Gram(s) Oral at bedtime  senna 3 Tablet(s) Oral at bedtime  warfarin 5 milliGRAM(s) Oral once    Allergies:  No Known Allergies    FAMILY HISTORY:  No pertinent family history in first degree relatives    LVAD Interrogation:  Pump Flow: 5.5   Pump Speed: 9000  Pulse Index: 6.6  Pump Power: 5.4  VAD Events: few PI no speed changes  Driveline evaluation: C/D/I  Programming Changes: [X ] No changes made [ ] Changes made:            Review of Systems:  Constitutional: [ ] Fever [ ] Chills [ ] Fatigue [ ] Weight change   HEENT: [ ] Blurred vision [ ] Eye Pain [ ] Headache [ ] Runny nose [ ] Sore Throat   Respiratory: [ ] Cough [ ] Wheezing [ ] Shortness of breath  Cardiovascular: [ ] Chest Pain [ ] Palpitations [ ] PEÑA [ ] PND [ ] Orthopnea  Gastrointestinal: [ ] Abdominal Pain [ ] Diarrhea [ ] Constipation [ ] Hemorrhoids [ ] Nausea [ ] Vomiting  Genitourinary: [ ] Nocturia [ ] Dysuria [ ] Incontinence  Extremities: [ ] Swelling [ ] Joint Pain  Neurologic: [ ] Focal deficit [ ] Paresthesias [ ] Syncope  Lymphatic: [ ] Swelling [ ] Lymphadenopathy   Skin: [ ] Rash [ ] Ecchymoses [ ] Wounds [ ] Lesions  Psychiatry: [ ] Depression [ ] Suicidal/Homicidal Ideation [ ] Anxiety [ ] Sleep Disturbances  [X ] 10 point review of systems is otherwise negative except as mentioned above            [ ]Unable to obtain    Physical Exam:  T(C): 36.6 (10-16-17 @ 13:52), Max: 37.1 (10-16-17 @ 02:00)  HR: 84 (10-16-17 @ 13:52) (84 - 110)  BP: --  RR: 18 (10-16-17 @ 13:52) (17 - 18)  SpO2: 98% (10-16-17 @ 13:52) (98% - 99%)  Wt(kg): --  I&O's Detail    15 Oct 2017 07:  -  16 Oct 2017 07:00  --------------------------------------------------------  IN:    Oral Fluid: 700 mL  Total IN: 700 mL    OUT:    Voided: 1400 mL  Total OUT: 1400 mL    Total NET: -700 mL      16 Oct 2017 07:  -  16 Oct 2017 18:47  --------------------------------------------------------  IN:    Oral Fluid: 320 mL  Total IN: 320 mL    OUT:    Voided: 250 mL  Total OUT: 250 mL    Total NET: 70 mL    Daily Weight in k (16 Oct 2017 13:52), Weight in k.1 (13 Oct 2017 12:00), Weight in k.2 (11 Oct 2017 09:40), Weight in k.3 (10 Oct 2017 10:07)    Appearance: [X ] Normal [ ] NAD  Eyes: [X ] PERRL [ ] EOMI  HENT: [ ] Normal oral [place] [ ]NC/AT  Cardiovascular: [ ] S1 [ ] S2 [ ] RRR [ ] No m/r/g [ ]No edema [ ] JVP + hum of LVAD  Respiratory: [X ] Clear to auscultation bilaterally  Gastrointestinal: [X ] Soft X[ ] Non-tender [X ] Non-distended [X ] BS+  Musculoskeletal: [ ] No clubbing [ ] No joint deformity   Neurologic: X[ ] Non-focal  Lymphatic: [ ] No lymphadenopathy  Psychiatry: [X ] AAOx3 [ ] Mood & affect appropriate  Skin: [X ] No rashes [ ] No ecchymoses [ ] No cyanosis    Labs:                        11.3   9.4   )-----------( 276      ( 16 Oct 2017 13:32 )             33.7     10-16    133<L>  |  94<L>  |  18  ----------------------------<  104<H>  4.7   |  28  |  0.76    Ca    9.7      16 Oct 2017 13:32      PT/INR - ( 16 Oct 2017 13:32 )   PT: 22.7 sec;   INR: 2.05 ratio         PTT - ( 16 Oct 2017 13:32 )  PTT:34.7 sec

## 2017-10-16 NOTE — PROGRESS NOTE ADULT - PROBLEM SELECTOR PLAN 6
- Fluid restriction for siadh  - Renal Following   - Endo Following Fluid restriction for siadh  Renal Following   Endo Following

## 2017-10-16 NOTE — PROGRESS NOTE ADULT - SUBJECTIVE AND OBJECTIVE BOX
Chief complaint  Patient is a 62y old  Male who presents with a chief complaint of SOOB (25 Aug 2017 22:27)   Review of systems  Patient in bed, comfortable, no fever, no hypoglycemia.    Labs and Fingersticks    CAPILLARY BLOOD GLUCOSE    Anion Gap, Serum: 11 (10-15 @ 05:35)      Calcium, Total Serum: 9.3 (10-15 @ 05:35)          10-15    135  |  96  |  12  ----------------------------<  91  4.3   |  28  |  0.58    Ca    9.3      15 Oct 2017 05:35                          9.5    8.7   )-----------( 238      ( 15 Oct 2017 05:35 )             27.6     Medications  MEDICATIONS  (STANDING):  aspirin enteric coated 81 milliGRAM(s) Oral daily  docusate sodium 100 milliGRAM(s) Oral three times a day  glycerin Suppository - Adult 1 Suppository(s) Rectal every 4 hours  lidocaine   Patch 1 Patch Transdermal daily  lidocaine   Patch 1 Patch Transdermal every 24 hours  lisinopril 5 milliGRAM(s) Oral daily  metoprolol succinate ER 25 milliGRAM(s) Oral daily  mirtazapine 7.5 milliGRAM(s) Oral at bedtime  oxyCODONE  ER Tablet 10 milliGRAM(s) Oral every 8 hours  pantoprazole    Tablet 40 milliGRAM(s) Oral before breakfast  polyethylene glycol 3350 17 Gram(s) Oral at bedtime  senna 3 Tablet(s) Oral at bedtime      Physical Exam  General: Patient comfortable in bed  Vital Signs Last 12 Hrs  T(F): 98 (10-16-17 @ 10:00), Max: 98.7 (10-16-17 @ 02:00)  HR: 110 (10-16-17 @ 10:00) (99 - 110)  BP: --  BP(mean): 76 (10-16-17 @ 10:00) (74 - 80)  RR: 18 (10-16-17 @ 10:00) (17 - 18)  SpO2: 99% (10-16-17 @ 10:00) (99% - 99%)  Neck: No palpable thyroid nodules.  CVS: S1S2, No murmurs  Respiratory: No wheezing, no crepitations  GI: Abdomen soft, bowel sounds positive  Musculoskeletal: Positive edema lower extremities bilaterally  Skin: No skin rashes, no ecchymosis    Diagnostics    Osmolality, Random Urine: Routine  Cancel Reason: Lab Operations Cancel (10-06 @ 10:48)  Osmolality, Serum: Routine (10-06 @ 10:48)  Sodium, Random Urine: Routine (10-06 @ 10:48)

## 2017-10-16 NOTE — PROGRESS NOTE ADULT - PROBLEM SELECTOR PLAN 7
- Continue with Remeron 7.5 mg PO HS   - Psychfollowing - Continue with Remeron 7.5 mg PO HS   - Psych following

## 2017-10-16 NOTE — PROGRESS NOTE ADULT - PROBLEM SELECTOR PLAN 2
stable: can start spiriva on DC , pt has not been wheezing  doing ok  10/11: pt has not been wheezing and has been stable breathing wise: BD prn  10/13: the last ct scan chest on 23 of sept showed paraseptal emphysema: and left lower lobe rounded atlectasis: he would need repeat ct scan chest in december to ensure proper retisolution of the atelectasis:  10/16: remains stable: no wheezing: oxygenation is good

## 2017-10-16 NOTE — CHART NOTE - NSCHARTNOTEFT_GEN_A_CORE
Source: Patient [ X]    Family [ ]     other [ X] RN, Medical record     Pt seen for malnutrition follow up. Pt reports a good PO intake, had cereal and milk for breakfast. Pt states he continues to drink Ensure enlive x2 daily to supplement his intake. Per PCA at bedside, every few days family has been dropping off meals that Pt will consume at either lunch or dinner. Pt unable to recall what meals his family brings. RD provided menu selection assistance. Pt was amenable to HF nutrition therapy education review. After much encouragement, Pt able to verbalize Na restrictions, Pt willing to morning Wt daily at home. Pt revealed he does not own a scale at home; will inform team. Pt use teach back points regarding daily weights and when to contact MD with Wt gains. Pt aware that RD remains available to review HF nutrition therapy as well as coumadin education.     Per chart, Ot with Stage D HF s/p LVAD placement with ongoing support and education. Pt with improved hypernatremia.       Diet : regular, Ensure enlive x3       Patient reports no GI distress. Last BM 2 days ago      PO intake:  50-60% of meals      Source for PO intake [X ] Patient [ ] family [ X] chart [ ] staff [ ] other      Current Weight: Weight (kg): 55.1kg on 10/13  10/11: 54.2kg  Admission Wt: 61kg   Wt slowly trending back up. no edema likely related to improving PO intake     Pertinent Medications: MEDICATIONS  (STANDING):  aspirin enteric coated 81 milliGRAM(s) Oral daily  docusate sodium 100 milliGRAM(s) Oral three times a day  glycerin Suppository - Adult 1 Suppository(s) Rectal every 4 hours  lidocaine   Patch 1 Patch Transdermal daily  lidocaine   Patch 1 Patch Transdermal every 24 hours  lisinopril 5 milliGRAM(s) Oral daily  metoprolol succinate ER 25 milliGRAM(s) Oral daily  mirtazapine 7.5 milliGRAM(s) Oral at bedtime  oxyCODONE  ER Tablet 10 milliGRAM(s) Oral every 8 hours  pantoprazole    Tablet 40 milliGRAM(s) Oral before breakfast  polyethylene glycol 3350 17 Gram(s) Oral at bedtime  senna 3 Tablet(s) Oral at bedtime    MEDICATIONS  (PRN):  naloxone Injectable 0.1 milliGRAM(s) IV Push every 3 minutes PRN Sedation and respiratory depression RR < 11  oxyCODONE    IR 5 milliGRAM(s) Oral every 4 hours PRN Mild, Moderate, Severe pain  phenylephrine 0.25% Suppository 1 Suppository(s) Rectal every 8 hours PRN Hemorrhoids    Pertinent Labs:  Hgb/Hct:9.5/27.6-low, Na:135, K:4.3, Cl:96, BUN:12, Cr:0.58, Glucose:91, Ca:9.3, LDH: 451    Skin: intact     Estimated Needs:   [ X] no change since previous assessment  [ ] recalculated:       Previous Nutrition Diagnosis:   [X ] Increased Nutrient Needs  [X ] Food & Nutrition Related Knowledge Deficit   [X ] Malnutrition          Nutrition Diagnosis is [X ] ongoing; above Dx improving          New Nutrition Diagnosis: [X ] not applicable      Recommend    1. Continue Ensure enlive x3  2. Continue HF and coumadin education reinforcement   3. Encourage adequate PO intake        Monitoring and Evaluation:     [X ] PO intake [X ] Tolerance to diet prescription [X ] weights [ X] follow up per protocol    [ X] other: RD remains available Sarah Siegler RD. Pager #[phone]

## 2017-10-17 LAB
ANION GAP SERPL CALC-SCNC: 10 MMOL/L — SIGNIFICANT CHANGE UP (ref 5–17)
BUN SERPL-MCNC: 14 MG/DL — SIGNIFICANT CHANGE UP (ref 7–23)
CALCIUM SERPL-MCNC: 9.3 MG/DL — SIGNIFICANT CHANGE UP (ref 8.4–10.5)
CHLORIDE SERPL-SCNC: 94 MMOL/L — LOW (ref 96–108)
CO2 SERPL-SCNC: 28 MMOL/L — SIGNIFICANT CHANGE UP (ref 22–31)
CREAT SERPL-MCNC: 0.65 MG/DL — SIGNIFICANT CHANGE UP (ref 0.5–1.3)
GLUCOSE SERPL-MCNC: 90 MG/DL — SIGNIFICANT CHANGE UP (ref 70–99)
HCT VFR BLD CALC: 28.6 % — LOW (ref 39–50)
HGB BLD-MCNC: 9.8 G/DL — LOW (ref 13–17)
INR BLD: 2.25 RATIO — HIGH (ref 0.88–1.16)
LDH SERPL L TO P-CCNC: 327 U/L — HIGH (ref 50–242)
MCHC RBC-ENTMCNC: 32.8 PG — SIGNIFICANT CHANGE UP (ref 27–34)
MCHC RBC-ENTMCNC: 34.3 GM/DL — SIGNIFICANT CHANGE UP (ref 32–36)
MCV RBC AUTO: 95.6 FL — SIGNIFICANT CHANGE UP (ref 80–100)
PLATELET # BLD AUTO: 269 K/UL — SIGNIFICANT CHANGE UP (ref 150–400)
POTASSIUM SERPL-MCNC: 4.7 MMOL/L — SIGNIFICANT CHANGE UP (ref 3.5–5.3)
POTASSIUM SERPL-SCNC: 4.7 MMOL/L — SIGNIFICANT CHANGE UP (ref 3.5–5.3)
PROTHROM AB SERPL-ACNC: 24.9 SEC — HIGH (ref 9.8–12.7)
RBC # BLD: 3 M/UL — LOW (ref 4.2–5.8)
RBC # FLD: 13.6 % — SIGNIFICANT CHANGE UP (ref 10.3–14.5)
SODIUM SERPL-SCNC: 132 MMOL/L — LOW (ref 135–145)
WBC # BLD: 9.6 K/UL — SIGNIFICANT CHANGE UP (ref 3.8–10.5)
WBC # FLD AUTO: 9.6 K/UL — SIGNIFICANT CHANGE UP (ref 3.8–10.5)

## 2017-10-17 RX ORDER — WARFARIN SODIUM 2.5 MG/1
6 TABLET ORAL ONCE
Qty: 0 | Refills: 0 | Status: COMPLETED | OUTPATIENT
Start: 2017-10-17 | End: 2017-10-17

## 2017-10-17 RX ORDER — SPIRONOLACTONE 25 MG/1
12.5 TABLET, FILM COATED ORAL DAILY
Qty: 0 | Refills: 0 | Status: DISCONTINUED | OUTPATIENT
Start: 2017-10-18 | End: 2017-10-25

## 2017-10-17 RX ORDER — SPIRONOLACTONE 25 MG/1
25 TABLET, FILM COATED ORAL DAILY
Qty: 0 | Refills: 0 | Status: DISCONTINUED | OUTPATIENT
Start: 2017-10-17 | End: 2017-10-17

## 2017-10-17 RX ORDER — WARFARIN SODIUM 2.5 MG/1
5 TABLET ORAL ONCE
Qty: 0 | Refills: 0 | Status: DISCONTINUED | OUTPATIENT
Start: 2017-10-17 | End: 2017-10-17

## 2017-10-17 RX ORDER — LISINOPRIL 2.5 MG/1
5 TABLET ORAL AT BEDTIME
Qty: 0 | Refills: 0 | Status: DISCONTINUED | OUTPATIENT
Start: 2017-10-18 | End: 2017-10-20

## 2017-10-17 RX ORDER — METOPROLOL TARTRATE 50 MG
50 TABLET ORAL DAILY
Qty: 0 | Refills: 0 | Status: DISCONTINUED | OUTPATIENT
Start: 2017-10-18 | End: 2017-10-25

## 2017-10-17 RX ADMIN — Medication 25 MILLIGRAM(S): at 06:34

## 2017-10-17 RX ADMIN — OXYCODONE HYDROCHLORIDE 10 MILLIGRAM(S): 5 TABLET ORAL at 15:36

## 2017-10-17 RX ADMIN — OXYCODONE HYDROCHLORIDE 10 MILLIGRAM(S): 5 TABLET ORAL at 04:29

## 2017-10-17 RX ADMIN — Medication 100 MILLIGRAM(S): at 06:34

## 2017-10-17 RX ADMIN — LIDOCAINE 1 PATCH: 4 CREAM TOPICAL at 00:00

## 2017-10-17 RX ADMIN — OXYCODONE HYDROCHLORIDE 5 MILLIGRAM(S): 5 TABLET ORAL at 12:30

## 2017-10-17 RX ADMIN — OXYCODONE HYDROCHLORIDE 5 MILLIGRAM(S): 5 TABLET ORAL at 11:51

## 2017-10-17 RX ADMIN — OXYCODONE HYDROCHLORIDE 10 MILLIGRAM(S): 5 TABLET ORAL at 22:00

## 2017-10-17 RX ADMIN — PANTOPRAZOLE SODIUM 40 MILLIGRAM(S): 20 TABLET, DELAYED RELEASE ORAL at 06:34

## 2017-10-17 RX ADMIN — OXYCODONE HYDROCHLORIDE 10 MILLIGRAM(S): 5 TABLET ORAL at 16:15

## 2017-10-17 RX ADMIN — OXYCODONE HYDROCHLORIDE 10 MILLIGRAM(S): 5 TABLET ORAL at 21:28

## 2017-10-17 RX ADMIN — OXYCODONE HYDROCHLORIDE 10 MILLIGRAM(S): 5 TABLET ORAL at 05:00

## 2017-10-17 RX ADMIN — MIRTAZAPINE 7.5 MILLIGRAM(S): 45 TABLET, ORALLY DISINTEGRATING ORAL at 21:27

## 2017-10-17 RX ADMIN — Medication 81 MILLIGRAM(S): at 11:45

## 2017-10-17 RX ADMIN — LISINOPRIL 5 MILLIGRAM(S): 2.5 TABLET ORAL at 06:34

## 2017-10-17 RX ADMIN — WARFARIN SODIUM 6 MILLIGRAM(S): 2.5 TABLET ORAL at 21:28

## 2017-10-17 NOTE — PROGRESS NOTE ADULT - ASSESSMENT
Spoke with patient using interpretation services of Creole speaking medically trained staff member.  Mood sad and frustrated.   Expressing irritability with extended hospitalization and ongoing issues regarding rehab availability.   As per LVAD coordinator, pt could be discharged home if another family member is available prior to family's return from [facility] on Sat (10-22-17).   Pt at first reported "there is no one" but then identified his nephew (Shaylee Aguirre [phone]) as someone who might provide some assistance for a few hours/day.  Also identified a friend/neighbor who might be available to stay at night.  Team to speak with support persons to see if they can assist patient.  Ambulating in antonio multiple times per day without distress.  Appetite ok but does not like hospital food.    Receptive to support and validation.  Sleep improved.  Taking Remeron 7.5mg at bedtime.       Dx: Adjustment disorder with depression     Recommendations:  Support and validation   Will benefit from additional coping strategies to manage current stressors   Behavioral Cardiology will continue to follow

## 2017-10-17 NOTE — PROGRESS NOTE ADULT - SUBJECTIVE AND OBJECTIVE BOX
Behavioral Cardiology Progress Note     HPI: Mr. Quispe is a 62 year old man with no PMH, found to have a nonischemic cardiomyopathy with ACC/AHA stage D congestive heart failure who underwent LVAD/TV annuloplasty for INTERMACS 2 on 9/12/17.  Hospital course complicated by left sided flank pain, which has significantly improved. Currently euvolemic and normotensive with MAP at goal. Discharge has been complicated by lack of rehab options, which we are investigating.    Behavioral Health Assessment:     Mood: "I want to go home."            Current stressors:   Adjustment to LVAD   Hospitalization    Family on vacation in [facility]      Support system/family support: Family support (family members -sister and her family) are currently in [facility] until 10/22/17.  Pt’s niece is an RN at [employer].       Coping strategies:  Limited; reports he tends to keep things to himself, reluctant to ask others for help.        Understanding of medical illness and treatment plan:  Good understanding of LVAD education as per LVAD coordinator.        MSE: Seen resting in bed.  A&Ox3.  Fairly related with intermittent eye contact. Thought process goal directed.  No abnormal thought content, denies s/i.  Mood: dysphoric.  Affect irritable. Insight and judgment adequate.

## 2017-10-17 NOTE — PROGRESS NOTE ADULT - ASSESSMENT
63 y/o Creole speaking male  presented with abdominal pain and  found to be in low output heart failure with cardiogenic shock (EF 10%) s/p 9/12 LVAD/TV annuloplasty for INTERMACS 2   Postop Course:   Acute blood loss anemia 2/2 blood loss postop -->requiring PRBC, amicar, PLT, cryo.  TTE 9/13 LVEDD 5.9 cm, mild-mod MR, aortic valve closed, mild RV dysfunction, mild TR.   c/o L sided pain; found to have L hemithorax s/p pigtail that was removed; fluid c/w exudative with lot of RBC,   (+) Hyponatremia - renal consulted- urine studies sent / Endocrine following for SIADH work up  10/6 Random Urine: 49; Patient euthyroid and not adrenally insufficient -->Patient dx with SIADH, on fluid restriction.  (+) depressive mood --> psych following; Remeron initiated; started at 7.5 mg HS; avoid up titrating 2/2 risk of worsening hyponatremia    LVAD interrogated w/o events.   10/9 Small amount of rectal bleeding after bowel movement; (+) internal hemorrhoids --> Continue bowel regimen / Start Preparation H suppository TID PRN.   No further bleeding noted. pt ambulating with a walker.  Psych follow up pending  Lisinopril d/c yesterday for hypotension, stable  Pain meds increased by palliative  10/12  coumadin dosing, na improving 134, ENDO for siadh, old ivl out refusing new ivl  10/13   coumadin dosing,   lisinopril added per HF  10/14   VSS   ambulating  10/15  VSS   INR down to 1.78   repat INR this afternoon   ?Hep GTT  10/16 INR > 2- no heparin gttp indicated.  continue meds as per CHF team; vss; pt stable; MAP 76   10/17 INR>2 D/W patient family returning from [facility] on this week  pending discharge to home ambulating coumadin 5 tonight   discharge planning to home vs rehab pending placement

## 2017-10-17 NOTE — PROGRESS NOTE ADULT - SUBJECTIVE AND OBJECTIVE BOX
Chief complaint  Patient is a 62y old  Male who presents with a chief complaint of SOOB (25 Aug 2017 22:27)   Review of systems  Patient in bed, comfortable, no fever,  no hypoglycemia.    Labs and Fingersticks    CAPILLARY BLOOD GLUCOSE    Anion Gap, Serum: 10 (10-17 @ 04:40)  Anion Gap, Serum: 11 (10-16 @ 13:32)      Calcium, Total Serum: 9.3 (10-17 @ 04:40)  Calcium, Total Serum: 9.7 (10-16 @ 13:32)          10-17    132<L>  |  94<L>  |  14  ----------------------------<  90  4.7   |  28  |  0.65    Ca    9.3      17 Oct 2017 04:40                          9.8    9.6   )-----------( 269      ( 17 Oct 2017 04:40 )             28.6     Medications  MEDICATIONS  (STANDING):  aspirin enteric coated 81 milliGRAM(s) Oral daily  docusate sodium 100 milliGRAM(s) Oral three times a day  glycerin Suppository - Adult 1 Suppository(s) Rectal every 4 hours  lidocaine   Patch 1 Patch Transdermal daily  lidocaine   Patch 1 Patch Transdermal every 24 hours  lisinopril 5 milliGRAM(s) Oral daily  metoprolol succinate ER 25 milliGRAM(s) Oral daily  mirtazapine 7.5 milliGRAM(s) Oral at bedtime  oxyCODONE  ER Tablet 10 milliGRAM(s) Oral every 8 hours  pantoprazole    Tablet 40 milliGRAM(s) Oral before breakfast  polyethylene glycol 3350 17 Gram(s) Oral at bedtime  senna 3 Tablet(s) Oral at bedtime  warfarin 5 milliGRAM(s) Oral once      Physical Exam  General: Patient comfortable in bed  Vital Signs Last 12 Hrs  T(F): 97.7 (10-17-17 @ 14:27), Max: 98.3 (10-17-17 @ 06:00)  HR: 104 (10-17-17 @ 14:27) (100 - 104)  BP: --  BP(mean): 76 (10-17-17 @ 14:27) (76 - 80)  RR: 18 (10-17-17 @ 14:27) (17 - 18)  SpO2: 97% (10-17-17 @ 14:27) (97% - 97%)  Neck: No palpable thyroid nodules.  CVS: S1S2, No murmurs  Respiratory: No wheezing, no crepitations  GI: Abdomen soft, bowel sounds positive  Musculoskeletal: Positive edema lower extremities bilaterally  Skin: No skin rashes, no ecchymosis    Diagnostics    Osmolality, Random Urine: Routine  Cancel Reason: Lab Operations Cancel (10-06 @ 10:48)  Osmolality, Serum: Routine (10-06 @ 10:48)  Sodium, Random Urine: Routine (10-06 @ 10:48)

## 2017-10-17 NOTE — PROGRESS NOTE ADULT - SUBJECTIVE AND OBJECTIVE BOX
Interval Events: Pt admits to mild abdominal pain, denies n/v, tolerating PO    HPI:62M with no PMHx here with chest pain. Began last week, described as pleuritic, worsens with cough and deep inspiration. Has been having SOB as well, dry cough. Recently vacationed in [facility] came back today. Saw doctor in [facility] but unclear what workup was. No fever, sick contacts, abd pain. CP described as midsternal, nonradiating. Has 25 pack year smoking history.  CTPA does not reveal PE, Pulm congestion noted.     MEDICATIONS  (STANDING):  aspirin enteric coated 81 milliGRAM(s) Oral daily  docusate sodium 100 milliGRAM(s) Oral three times a day  glycerin Suppository - Adult 1 Suppository(s) Rectal every 4 hours  lidocaine   Patch 1 Patch Transdermal daily  lidocaine   Patch 1 Patch Transdermal every 24 hours  lisinopril 5 milliGRAM(s) Oral daily  metoprolol succinate ER 25 milliGRAM(s) Oral daily  mirtazapine 7.5 milliGRAM(s) Oral at bedtime  oxyCODONE  ER Tablet 10 milliGRAM(s) Oral every 8 hours  pantoprazole    Tablet 40 milliGRAM(s) Oral before breakfast  polyethylene glycol 3350 17 Gram(s) Oral at bedtime  senna 3 Tablet(s) Oral at bedtime    MEDICATIONS  (PRN):  naloxone Injectable 0.1 milliGRAM(s) IV Push every 3 minutes PRN Sedation and respiratory depression RR < 11  oxyCODONE    IR 5 milliGRAM(s) Oral every 4 hours PRN Mild, Moderate, Severe pain  phenylephrine 0.25% Suppository 1 Suppository(s) Rectal every 8 hours PRN Hemorrhoids      Allergies    No Known Allergies    Intolerances        Review of Systems:    General:  No wt loss, fevers, chills, night sweats, fatigue,   Eyes:  Good vision, no reported pain  ENT:  No sore throat, pain, runny nose, dysphagia  CV:  No pain, palpitations, hypo/hypertension  Resp:  No dyspnea, cough, tachypnea, wheezing  GI:  mild diffuse abdominal pain, No nausea, No vomiting, No diarrhea, No constipation, No weight loss, No fever, No pruritis, No rectal bleeding, No tarry stools, No dysphagia,  :  No pain, bleeding, incontinence, nocturia  Muscle:  No pain, weakness  Neuro:  No weakness, tingling, memory problems  Psych:  No fatigue, insomnia, mood problems, depression  Endocrine:  No polyuria, polydipsia, cold/heat intolerance  Heme:  No petechiae, ecchymosis, easy bruisability  Skin:  No rash, tattoos, scars, edema    Vital Signs Last 24 Hrs  T(C): 36.4 (17 Oct 2017 10:00), Max: 37.2 (16 Oct 2017 21:30)  T(F): 97.6 (17 Oct 2017 10:00), Max: 98.9 (16 Oct 2017 21:30)  HR: 100 (17 Oct 2017 10:00) (84 - 100)  BP: --  BP(mean): 76 (17 Oct 2017 10:00) (70 - 86)  RR: 18 (17 Oct 2017 10:00) (17 - 18)  SpO2: 97% (17 Oct 2017 10:00) (96% - 100%)    PHYSICAL EXAM:    GENERAL:  Appears stated age, well-groomed, well-nourished, no distress  HEENT:  NC/AT,  conjunctivae clear and pink, no thyromegaly, nodules, adenopathy, no JVD, sclera -anicteric  CHEST:  Full & symmetric excursion, no increased effort, breath sounds clear  HEART:  Regular rhythm, S1, S2, no murmur/rub/S3/S4, no abdominal bruit, no edema  ABDOMEN:  Soft, mild diffuse tenderness, non-distended, normoactive bowel sounds,  no masses ,no hepato-splenomegaly, no signs of chronic liver disease  EXTREMITIES:  no cyanosis, clubbing or edema  SKIN:  No rash/erythema/ecchymoses/petechiae/wounds/abscess/warm/dry  NEURO:  Alert, oriented, no asterixis, no tremor, no encephalopathy      LABS:                        9.8    9.6   )-----------( 269      ( 17 Oct 2017 04:40 )             28.6     10-17    132<L>  |  94<L>  |  14  ----------------------------<  90  4.7   |  28  |  0.65    Ca    9.3      17 Oct 2017 04:40      PT/INR - ( 17 Oct 2017 04:40 )   PT: 24.9 sec;   INR: 2.25 ratio         PTT - ( 16 Oct 2017 13:32 )  PTT:34.7 sec      RADIOLOGY & ADDITIONAL TESTS:

## 2017-10-17 NOTE — PROGRESS NOTE ADULT - PROBLEM SELECTOR PLAN 1
Ongoing education and support  Toprol increased to 50 mg daily in am  Continue lisinopril 5 mg daily at bedtime  Start Spironolactone 12.5 mg po daily

## 2017-10-17 NOTE — PROGRESS NOTE ADULT - SUBJECTIVE AND OBJECTIVE BOX
Subjective Hello ambulating in antonio no distress    VITAL SIGNS    Telemetry:  SR      Vital Signs Last 24 Hrs  T(C): 36.4 (10-17-17 @ 10:00), Max: 37.2 (10-16-17 @ 21:30)  T(F): 97.6 (10-17-17 @ 10:00), Max: 98.9 (10-16-17 @ 21:30)  HR: 100 (10-17-17 @ 10:00) (84 - 100)  BP: --  RR: 18 (10-17-17 @ 10:00) (17 - 18)  SpO2: 97% (10-17-17 @ 10:00) (96% - 100%)           10-16 @ 07:01  -  10-17 @ 07:00  --------------------------------------------------------  IN: 760 mL / OUT: 1625 mL / NET: -865 mL    10-17 @ 07:01  -  10-17 @ 12:31  --------------------------------------------------------  IN: 480 mL / OUT: 200 mL / NET: 280 mL  LVAD speed 9000  flow 5.8  [power 5.6  PI 6.5    Daily Weight in k (17 Oct 2017 11:16)  Coumadin    [ x] YES          Reason:LVAD          PHYSICAL EXAM  Neurology: alert and oriented x 3, nonfocal, no gross deficits  CV :S1 S2VAD sounds    Sternal Wound : TAI Stable no draineage  Lungs:CTA  Abdomen: soft, nontender, nondistended, positive bowel sounds, last bowel movement 10/14  :  Voiding           Extremities: lower extremities warm well perfused    no edema no pain    Physical Therapy Rec:   Home  [  x    Discussed with Cardiothoracic Team at AM rounds.

## 2017-10-17 NOTE — PROGRESS NOTE ADULT - PROBLEM SELECTOR PLAN 1
Continue with ASA   meds as per CHF team   Daily LDH   Continue with AC therapy on coumadin for a therapeutic INR goal 2-3    D/C plan Rehab vs home - await placement

## 2017-10-17 NOTE — PROGRESS NOTE ADULT - SUBJECTIVE AND OBJECTIVE BOX
Patient seen and evaluated @   Chief Complaint:     HPI:  62M with no PMHx here with chest pain. Began last week, described as pleuritic, worsens with cough and deep inspiration. Has been having SOB as well, dry cough. Recently vacationed in [facility] came back today. Saw doctor in [facility] but unclear what workup was. No fever, sick contacts, abd pain. CP described as midsternal, nonradiating. Has 25 pack year smoking history.  CTPA does not reveal PE, Pulm congestion noted. (25 Aug 2017 22:27)    PMH:   No pertinent past medical history    PSH:   No significant past surgical history    Medications:   aspirin enteric coated 81 milliGRAM(s) Oral daily  docusate sodium 100 milliGRAM(s) Oral three times a day  glycerin Suppository - Adult 1 Suppository(s) Rectal every 4 hours  lidocaine   Patch 1 Patch Transdermal daily  lidocaine   Patch 1 Patch Transdermal every 24 hours  mirtazapine 7.5 milliGRAM(s) Oral at bedtime  naloxone Injectable 0.1 milliGRAM(s) IV Push every 3 minutes PRN  oxyCODONE    IR 5 milliGRAM(s) Oral every 4 hours PRN  oxyCODONE  ER Tablet 10 milliGRAM(s) Oral every 8 hours  pantoprazole    Tablet 40 milliGRAM(s) Oral before breakfast  phenylephrine 0.25% Suppository 1 Suppository(s) Rectal every 8 hours PRN  polyethylene glycol 3350 17 Gram(s) Oral at bedtime  senna 3 Tablet(s) Oral at bedtime  warfarin 5 milliGRAM(s) Oral once    Allergies:  No Known Allergies    FAMILY HISTORY:  No pertinent family history in first degree relatives    Social History:  Smoking:  Alcohol:  Drugs:              Review of Systems:  Constitutional: [ ] Fever [ ] Chills [ ] Fatigue [ ] Weight change   HEENT: [ ] Blurred vision [ ] Eye Pain [ ] Headache [ ] Runny nose [ ] Sore Throat   Respiratory: [ ] Cough [ ] Wheezing [ ] Shortness of breath  Cardiovascular: [ ] Chest Pain [ ] Palpitations [ ] PEÑA [ ] PND [ ] Orthopnea  Gastrointestinal: [ ] Abdominal Pain [ ] Diarrhea [ ] Constipation [ ] Hemorrhoids [ ] Nausea [ ] Vomiting  Genitourinary: [ ] Nocturia [ ] Dysuria [ ] Incontinence  Extremities: [ ] Swelling [ ] Joint Pain  Neurologic: [ ] Focal deficit [ ] Paresthesias [ ] Syncope  Lymphatic: [ ] Swelling [ ] Lymphadenopathy   Skin: [ ] Rash [ ] Ecchymoses [ ] Wounds [ ] Lesions  Psychiatry: [ ] Depression [ ] Suicidal/Homicidal Ideation [ ] Anxiety [ ] Sleep Disturbances  [ ] 10 point review of systems is otherwise negative except as mentioned above            [ ]Unable to obtain    Physical Exam:  T(C): 36.6 (10-17-17 @ 18:39), Max: 37.2 (10-16-17 @ 21:30)  HR: 109 (10-17-17 @ 18:39) (94 - 109)  BP: --  RR: 18 (10-17-17 @ 18:39) (17 - 18)  SpO2: 99% (10-17-17 @ 18:39) (96% - 100%)  Wt(kg): --  I&O's Detail    16 Oct 2017 07:01  -  17 Oct 2017 07:00  --------------------------------------------------------  IN:    Oral Fluid: 760 mL  Total IN: 760 mL    OUT:    Voided: 1625 mL  Total OUT: 1625 mL    Total NET: -865 mL      17 Oct 2017 07:01  -  17 Oct 2017 18:47  --------------------------------------------------------  IN:    Oral Fluid: 480 mL  Total IN: 480 mL    OUT:    Voided: 400 mL  Total OUT: 400 mL    Total NET: 80 mL      Daily Weight in k (17 Oct 2017 11:16), Weight in k (16 Oct 2017 13:52), Weight in k.1 (13 Oct 2017 12:00), Weight in k.2 (11 Oct 2017 09:40)    Appearance: [X ] Normal [ ] NAD  Eyes: [X ] PERRL [ ] EOMI  HENT: [ ] Normal oral [place] [ ]NC/AT  Cardiovascular: [ ] S1 [ ] S2 [ ] RRR [ ] No m/r/g [ ]No edema [ ] JVP +hum of LVAD  Respiratory: [ X] Clear to auscultation bilaterally  Gastrointestinal: [X ] Soft [X ] Non-tender [ X] Non-distended [ X] BS+  Musculoskeletal: [ X] No clubbing [ ] No joint deformity   Neurologic: [X ] Non-focal  Lymphatic: [ ] No lymphadenopathy  Psychiatry: X[ ] AAOx3 [ ] Mood & affect appropriate  Skin: [X ] No rashes [ ] No ecchymoses [ ] No cyanosis        Labs:                        9.8    9.6   )-----------( 269      ( 17 Oct 2017 04:40 )             28.6     10-17    132<L>  |  94<L>  |  14  ----------------------------<  90  4.7   |  28  |  0.65    Ca    9.3      17 Oct 2017 04:40      PT/INR - ( 17 Oct 2017 04:40 )   PT: 24.9 sec;   INR: 2.25 ratio         PTT - ( 16 Oct 2017 13:32 )  PTT:34.7 sec    LDH: 327

## 2017-10-17 NOTE — PROGRESS NOTE ADULT - PROBLEM SELECTOR PLAN 2
Continue with Toprol XL 25 qd/ Lisinopril  Continue with AC therapy on coumadin for a therapeutic INR goal 2-3

## 2017-10-18 LAB
ANION GAP SERPL CALC-SCNC: 11 MMOL/L — SIGNIFICANT CHANGE UP (ref 5–17)
BASOPHILS # BLD AUTO: 0.1 K/UL — SIGNIFICANT CHANGE UP (ref 0–0.2)
BASOPHILS NFR BLD AUTO: 0.9 % — SIGNIFICANT CHANGE UP (ref 0–2)
BUN SERPL-MCNC: 21 MG/DL — SIGNIFICANT CHANGE UP (ref 7–23)
CALCIUM SERPL-MCNC: 9.2 MG/DL — SIGNIFICANT CHANGE UP (ref 8.4–10.5)
CHLORIDE SERPL-SCNC: 94 MMOL/L — LOW (ref 96–108)
CO2 SERPL-SCNC: 29 MMOL/L — SIGNIFICANT CHANGE UP (ref 22–31)
CREAT SERPL-MCNC: 0.77 MG/DL — SIGNIFICANT CHANGE UP (ref 0.5–1.3)
EOSINOPHIL # BLD AUTO: 0.9 K/UL — HIGH (ref 0–0.5)
EOSINOPHIL NFR BLD AUTO: 9.2 % — HIGH (ref 0–6)
GLUCOSE SERPL-MCNC: 149 MG/DL — HIGH (ref 70–99)
HCT VFR BLD CALC: 28.1 % — LOW (ref 39–50)
HGB BLD-MCNC: 9.6 G/DL — LOW (ref 13–17)
INR BLD: 2.08 RATIO — HIGH (ref 0.88–1.16)
INR BLD: 4.87 RATIO — HIGH (ref 0.88–1.16)
LDH SERPL L TO P-CCNC: 327 U/L — HIGH (ref 50–242)
LYMPHOCYTES # BLD AUTO: 1.6 K/UL — SIGNIFICANT CHANGE UP (ref 1–3.3)
LYMPHOCYTES # BLD AUTO: 17.6 % — SIGNIFICANT CHANGE UP (ref 13–44)
MCHC RBC-ENTMCNC: 32.5 PG — SIGNIFICANT CHANGE UP (ref 27–34)
MCHC RBC-ENTMCNC: 34.1 GM/DL — SIGNIFICANT CHANGE UP (ref 32–36)
MCV RBC AUTO: 95.4 FL — SIGNIFICANT CHANGE UP (ref 80–100)
MONOCYTES # BLD AUTO: 0.9 K/UL — SIGNIFICANT CHANGE UP (ref 0–0.9)
MONOCYTES NFR BLD AUTO: 10.2 % — SIGNIFICANT CHANGE UP (ref 2–14)
NEUTROPHILS # BLD AUTO: 5.8 K/UL — SIGNIFICANT CHANGE UP (ref 1.8–7.4)
NEUTROPHILS NFR BLD AUTO: 62.1 % — SIGNIFICANT CHANGE UP (ref 43–77)
PLATELET # BLD AUTO: 257 K/UL — SIGNIFICANT CHANGE UP (ref 150–400)
POTASSIUM SERPL-MCNC: 4.4 MMOL/L — SIGNIFICANT CHANGE UP (ref 3.5–5.3)
POTASSIUM SERPL-SCNC: 4.4 MMOL/L — SIGNIFICANT CHANGE UP (ref 3.5–5.3)
PROTHROM AB SERPL-ACNC: 22.8 SEC — HIGH (ref 9.8–12.7)
PROTHROM AB SERPL-ACNC: 54.3 SEC — HIGH (ref 9.8–12.7)
RBC # BLD: 2.94 M/UL — LOW (ref 4.2–5.8)
RBC # FLD: 13.7 % — SIGNIFICANT CHANGE UP (ref 10.3–14.5)
SODIUM SERPL-SCNC: 134 MMOL/L — LOW (ref 135–145)
WBC # BLD: 9.3 K/UL — SIGNIFICANT CHANGE UP (ref 3.8–10.5)
WBC # FLD AUTO: 9.3 K/UL — SIGNIFICANT CHANGE UP (ref 3.8–10.5)

## 2017-10-18 RX ORDER — OXYCODONE HYDROCHLORIDE 5 MG/1
5 TABLET ORAL EVERY 6 HOURS
Qty: 0 | Refills: 0 | Status: DISCONTINUED | OUTPATIENT
Start: 2017-10-18 | End: 2017-10-25

## 2017-10-18 RX ADMIN — Medication 100 MILLIGRAM(S): at 06:16

## 2017-10-18 RX ADMIN — OXYCODONE HYDROCHLORIDE 5 MILLIGRAM(S): 5 TABLET ORAL at 10:45

## 2017-10-18 RX ADMIN — Medication 1 SUPPOSITORY(S): at 17:54

## 2017-10-18 RX ADMIN — SPIRONOLACTONE 12.5 MILLIGRAM(S): 25 TABLET, FILM COATED ORAL at 06:15

## 2017-10-18 RX ADMIN — Medication 81 MILLIGRAM(S): at 11:54

## 2017-10-18 RX ADMIN — OXYCODONE HYDROCHLORIDE 5 MILLIGRAM(S): 5 TABLET ORAL at 17:52

## 2017-10-18 RX ADMIN — Medication 50 MILLIGRAM(S): at 06:15

## 2017-10-18 RX ADMIN — LISINOPRIL 5 MILLIGRAM(S): 2.5 TABLET ORAL at 22:10

## 2017-10-18 RX ADMIN — OXYCODONE HYDROCHLORIDE 5 MILLIGRAM(S): 5 TABLET ORAL at 09:52

## 2017-10-18 RX ADMIN — Medication 100 MILLIGRAM(S): at 14:32

## 2017-10-18 RX ADMIN — PANTOPRAZOLE SODIUM 40 MILLIGRAM(S): 20 TABLET, DELAYED RELEASE ORAL at 06:16

## 2017-10-18 RX ADMIN — OXYCODONE HYDROCHLORIDE 5 MILLIGRAM(S): 5 TABLET ORAL at 18:30

## 2017-10-18 RX ADMIN — MIRTAZAPINE 7.5 MILLIGRAM(S): 45 TABLET, ORALLY DISINTEGRATING ORAL at 22:10

## 2017-10-18 NOTE — PROGRESS NOTE ADULT - PROBLEM SELECTOR PLAN 2
Daily LDH  coumadin dose held for goal INR of 2-2.5( today's inr is 4.3  continue current dose of aspirin at 81 mg daily.

## 2017-10-18 NOTE — PROGRESS NOTE ADULT - SUBJECTIVE AND OBJECTIVE BOX
Chief complaint  Patient is a 62y old  Male who presents with a chief complaint of SOOB (25 Aug 2017 22:27)   Review of systems  Patient in bed, comfortable, no fever, no hypoglycemia.    Labs and Fingersticks    CAPILLARY BLOOD GLUCOSE    Anion Gap, Serum: 11 (10-18 @ 01:35)  Anion Gap, Serum: 10 (10-17 @ 04:40)  Anion Gap, Serum: 11 (10-16 @ 13:32)      Calcium, Total Serum: 9.2 (10-18 @ 01:35)  Calcium, Total Serum: 9.3 (10-17 @ 04:40)  Calcium, Total Serum: 9.7 (10-16 @ 13:32)          10-18    134<L>  |  94<L>  |  21  ----------------------------<  149<H>  4.4   |  29  |  0.77    Ca    9.2      18 Oct 2017 01:35                          9.6    9.3   )-----------( 257      ( 18 Oct 2017 01:35 )             28.1     Medications  MEDICATIONS  (STANDING):  aspirin enteric coated 81 milliGRAM(s) Oral daily  docusate sodium 100 milliGRAM(s) Oral three times a day  glycerin Suppository - Adult 1 Suppository(s) Rectal every 4 hours  lidocaine   Patch 1 Patch Transdermal daily  lidocaine   Patch 1 Patch Transdermal every 24 hours  lisinopril 5 milliGRAM(s) Oral at bedtime  metoprolol succinate ER 50 milliGRAM(s) Oral daily  mirtazapine 7.5 milliGRAM(s) Oral at bedtime  pantoprazole    Tablet 40 milliGRAM(s) Oral before breakfast  polyethylene glycol 3350 17 Gram(s) Oral at bedtime  senna 3 Tablet(s) Oral at bedtime  spironolactone 12.5 milliGRAM(s) Oral daily      Physical Exam  General: Patient comfortable in bed  Vital Signs Last 12 Hrs  T(F): 98.2 (10-18-17 @ 08:25), Max: 98.2 (10-18-17 @ 08:25)  HR: 104 (10-18-17 @ 08:25) (104 - 104)  BP: --  BP(mean): 80 (10-18-17 @ 08:25) (80 - 82)  RR: 18 (10-18-17 @ 08:25) (17 - 18)  SpO2: 99% (10-18-17 @ 08:25) (99% - 100%)  Neck: No palpable thyroid nodules.  CVS: S1S2, No murmurs  Respiratory: No wheezing, no crepitations  GI: Abdomen soft, bowel sounds positive  Musculoskeletal: Positive edema lower extremities bilaterally  Skin: No skin rashes, no ecchymosis    Diagnostics    Osmolality, Random Urine: Routine  Cancel Reason: Lab Operations Cancel (10-06 @ 10:48)  Osmolality, Serum: Routine (10-06 @ 10:48)  Sodium, Random Urine: Routine (10-06 @ 10:48)

## 2017-10-18 NOTE — PROGRESS NOTE ADULT - ASSESSMENT
61 y/o Creole speaking male  presented with abdominal pain and  found to be in low output heart failure with cardiogenic shock (EF 10%) s/p 9/12 LVAD/TV annuloplasty for INTERMACS 2   Postop Course:   Acute blood loss anemia 2/2 blood loss postop -->requiring PRBC, amicar, PLT, cryo.  TTE 9/13 LVEDD 5.9 cm, mild-mod MR, aortic valve closed, mild RV dysfunction, mild TR.   c/o L sided pain; found to have L hemithorax s/p pigtail that was removed; fluid c/w exudative with lot of RBC,   (+) Hyponatremia - renal consulted- urine studies sent / Endocrine following for SIADH work up  10/6 Random Urine: 49; Patient euthyroid and not adrenally insufficient -->Patient dx with SIADH, on fluid restriction.  (+) depressive mood --> psych following; Remeron initiated; started at 7.5 mg HS; avoid up titrating 2/2 risk of worsening hyponatremia    LVAD interrogated w/o events.   10/9 Small amount of rectal bleeding after bowel movement; (+) internal hemorrhoids --> Continue bowel regimen / Start Preparation H suppository TID PRN.   No further bleeding noted. pt ambulating with a walker.  Psych follow up pending  Lisinopril d/c yesterday for hypotension, stable  Pain meds increased by palliative  10/12  coumadin dosing, na improving 134, ENDO for siadh, old ivl out refusing new ivl  10/13   coumadin dosing,   lisinopril added per HF  10/14   VSS   ambulating  10/15  VSS   INR down to 1.78   repat INR this afternoon   ?Hep GTT  10/16 INR > 2- no heparin gttp indicated.  continue meds as per CHF team; vss; pt stable; MAP 76   10/17 INR>2 D/W patient family returning from [facility] on this week  pending discharge to home ambulating coumadin 5 tonight   10/18 INR 4.87 hold coumadin tonight  ambulating    discharge planning to home vs rehab pending placement

## 2017-10-18 NOTE — PROGRESS NOTE ADULT - SUBJECTIVE AND OBJECTIVE BOX
Patient is a 62y old  Male who presents with a chief complaint of SOOB (25 Aug 2017 22:27)  c/o of left sided upper back and shoulder pain  denies any SOB   eating breakfast!!    Any change in ROS:     MEDICATIONS  (STANDING):  aspirin enteric coated 81 milliGRAM(s) Oral daily  docusate sodium 100 milliGRAM(s) Oral three times a day  glycerin Suppository - Adult 1 Suppository(s) Rectal every 4 hours  lidocaine   Patch 1 Patch Transdermal daily  lidocaine   Patch 1 Patch Transdermal every 24 hours  lisinopril 5 milliGRAM(s) Oral at bedtime  metoprolol succinate ER 50 milliGRAM(s) Oral daily  mirtazapine 7.5 milliGRAM(s) Oral at bedtime  pantoprazole    Tablet 40 milliGRAM(s) Oral before breakfast  polyethylene glycol 3350 17 Gram(s) Oral at bedtime  senna 3 Tablet(s) Oral at bedtime  spironolactone 12.5 milliGRAM(s) Oral daily    MEDICATIONS  (PRN):  naloxone Injectable 0.1 milliGRAM(s) IV Push every 3 minutes PRN Sedation and respiratory depression RR < 11  oxyCODONE    IR 5 milliGRAM(s) Oral every 6 hours PRN Severe Pain (7 - 10)  phenylephrine 0.25% Suppository 1 Suppository(s) Rectal every 8 hours PRN Hemorrhoids    Vital Signs Last 24 Hrs  T(C): 36.8 (18 Oct 2017 08:25), Max: 36.8 (17 Oct 2017 23:00)  T(F): 98.2 (18 Oct 2017 08:25), Max: 98.2 (17 Oct 2017 23:00)  HR: 104 (18 Oct 2017 08:25) (96 - 109)  BP: --  BP(mean): 80 (18 Oct 2017 08:25) (74 - 82)  RR: 18 (18 Oct 2017 08:25) (17 - 18)  SpO2: 99% (18 Oct 2017 08:25) (97% - 100%)    I&O's Summary    17 Oct 2017 07:01  -  18 Oct 2017 07:00  --------------------------------------------------------  IN: 720 mL / OUT: 950 mL / NET: -230 mL    18 Oct 2017 07:01  -  18 Oct 2017 10:58  --------------------------------------------------------  IN: 0 mL / OUT: 200 mL / NET: -200 mL          Physical Exam:   GENERAL: cachectic  HEENT: ALONSO/   Atraumatic, Normocephalic  ENMT: No tonsillar erythema, exudates, or enlargement; Moist mucous membranes, Good dentition, No lesions  NECK: Supple, No JVD, Normal thyroid  CHEST/LUNG: Clear to auscultaion, ; No rales, rhonchi, wheezing, or rubs  CVS: Regular rate and rhythm; No murmurs, rubs, or gallops  GI: : Soft, Nontender, Nondistended; Bowel sounds present  NERVOUS SYSTEM:  Alert & Oriented X3  EXTREMITIES:  2+ Peripheral Pulses, No clubbing, cyanosis, or edema  LYMPH: No lymphadenopathy noted  SKIN: No rashes or lesions  ENDOCRINOLOGY: No Thyromegaly  PSYCH: Appropriate    Labs:                              9.6    9.3   )-----------( 257      ( 18 Oct 2017 01:35 )             28.1                         9.8    9.6   )-----------( 269      ( 17 Oct 2017 04:40 )             28.6                         11.3   9.4   )-----------( 276      ( 16 Oct 2017 13:32 )             33.7                         9.5    8.7   )-----------( 238      ( 15 Oct 2017 05:35 )             27.6     10-18    134<L>  |  94<L>  |  21  ----------------------------<  149<H>  4.4   |  29  |  0.77  10-17    132<L>  |  94<L>  |  14  ----------------------------<  90  4.7   |  28  |  0.65  10-16    133<L>  |  94<L>  |  18  ----------------------------<  104<H>  4.7   |  28  |  0.76  10-15    135  |  96  |  12  ----------------------------<  91  4.3   |  28  |  0.58    Ca    9.2      18 Oct 2017 01:35  Ca    9.3      17 Oct 2017 04:40  Ca    9.7      16 Oct 2017 13:32      CAPILLARY BLOOD GLUCOSE            PT/INR - ( 18 Oct 2017 08:33 )   PT: 54.3 sec;   INR: 4.87 ratio         PTT - ( 16 Oct 2017 13:32 )  PTT:34.7 sec    Cultures:   < from: Xray Chest 1 View AP- PORTABLE-Urgent (09.29.17 @ 16:37) >        INTERPRETATION:  AP chest with comparison to 1347 hours from the same day.    IMPRESSION: Left pigtail catheter removed.    IMPRESSION: Left-sided pigtail catheter has been removed. There is no   pneumothorax. The heart is normal in size. The patient is status post   median sternotomy. The lungs are clear.                    TIA BENAVIDES M.D., ATTENDING RADIOLOGIST  This document has been electronically signed. Sep 30 2017  4:18PM        < end of copied text >                            Studies  Chest X-RAY  CT SCAN Chest   Venous Dopplers: LE:   CT Abdomen  Others

## 2017-10-18 NOTE — PROGRESS NOTE ADULT - SUBJECTIVE AND OBJECTIVE BOX
HPI:  62M with no PMHx here with chest pain. Began last week, described as pleuritic, worsens with cough and deep inspiration. Has been having SOB as well, dry cough. Recently vacationed in [facility] came back today. Saw doctor in [facility] but unclear what workup was. No fever, sick contacts, abd pain. CP described as midsternal, nonradiating. Has 25 pack year smoking history.  CTPA does not reveal PE, Pulm congestion noted. (25 Aug 2017 22:27)    PMH:   No pertinent past medical history    PSH:   No significant past surgical history    Medications:   aspirin enteric coated 81 milliGRAM(s) Oral daily  docusate sodium 100 milliGRAM(s) Oral three times a day  glycerin Suppository - Adult 1 Suppository(s) Rectal every 4 hours  lidocaine   Patch 1 Patch Transdermal daily  lidocaine   Patch 1 Patch Transdermal every 24 hours  lisinopril 5 milliGRAM(s) Oral at bedtime  metoprolol succinate ER 50 milliGRAM(s) Oral daily  mirtazapine 7.5 milliGRAM(s) Oral at bedtime  naloxone Injectable 0.1 milliGRAM(s) IV Push every 3 minutes PRN  oxyCODONE    IR 5 milliGRAM(s) Oral every 6 hours PRN  pantoprazole    Tablet 40 milliGRAM(s) Oral before breakfast  phenylephrine 0.25% Suppository 1 Suppository(s) Rectal every 8 hours PRN  polyethylene glycol 3350 17 Gram(s) Oral at bedtime  senna 3 Tablet(s) Oral at bedtime  spironolactone 12.5 milliGRAM(s) Oral daily    Allergies:  No Known Allergies    FAMILY HISTORY:  No pertinent family history in first degree relatives    LVAD Interrogation:  Pump Flow: 5.8  Pump Speed: 9000  Pulse Index: 5.9  Pump Power: 5.5  VAD Events: No alarms or events when VAD interrogated  Driveline evaluation:  C/D/I  Programming Changes: [X ] No changes made [ ] Changes made:          Review of Systems:  Constitutional: [ ] Fever [ ] Chills [ ] Fatigue [ ] Weight change   HEENT: [ ] Blurred vision [ ] Eye Pain [ ] Headache [ ] Runny nose [ ] Sore Throat   Respiratory: [ ] Cough [ ] Wheezing [ ] Shortness of breath  Cardiovascular: [ ] Chest Pain [ ] Palpitations [ ] PEÑA [ ] PND [ ] Orthopnea  Gastrointestinal: [ ] Abdominal Pain [ ] Diarrhea [ ] Constipation [ ] Hemorrhoids [ ] Nausea [ ] Vomiting  Genitourinary: [ ] Nocturia [ ] Dysuria [ ] Incontinence  Extremities: [ ] Swelling [ ] Joint Pain  Neurologic: [ ] Focal deficit [ ] Paresthesias [ ] Syncope  Lymphatic: [ ] Swelling [ ] Lymphadenopathy   Skin: [ ] Rash [ ] Ecchymoses [ ] Wounds [ ] Lesions  Psychiatry: [ ] Depression [ ] Suicidal/Homicidal Ideation [ ] Anxiety [ ] Sleep Disturbances  [X ] 10 point review of systems is otherwise negative except as mentioned above            [ ]Unable to obtain    Physical Exam:  T(C): 37.1 (10-18-17 @ 18:00), Max: 37.1 (10-18-17 @ 18:00)  HR: 108 (10-18-17 @ 18:00) (96 - 108)  BP: --  RR: 18 (10-18-17 @ 18:00) (17 - 18)  SpO2: 98% (10-18-17 @ 18:00) (97% - 100%)  Wt(kg): --  I&O's Detail    17 Oct 2017 07:  -  18 Oct 2017 07:00  --------------------------------------------------------  IN:    Oral Fluid: 720 mL  Total IN: 720 mL    OUT:    Voided: 950 mL  Total OUT: 950 mL    Total NET: -230 mL      18 Oct 2017 07:  -  18 Oct 2017 18:49  --------------------------------------------------------  IN:    Oral Fluid: 300 mL  Total IN: 300 mL    OUT:    Voided: 500 mL  Total OUT: 500 mL    Total NET: -200 mL          Daily Weight in k (17 Oct 2017 11:16), Weight in k (16 Oct 2017 13:52), Weight in k.1 (13 Oct 2017 12:00)    Appearance: [X ] Normal [ ] NAD  Eyes: [ ] PERRL [ ] EOMI  HENT: [X ] Normal oral [place] [ ]NC/AT  Cardiovascular: [ ] S1 [ ] S2 [ ] RRR [ ] No m/r/g [ ]No edema [ ] JVP +hum of VAD  Respiratory: [X ] Clear to auscultation bilaterally  Gastrointestinal: [X ] Soft [X ] Non-tender X[ ] Non-distended [X ] BS+  Musculoskeletal: [X ] No clubbing [ ] No joint deformity   Neurologic: [X ] Non-focal  Lymphatic: [ ] No lymphadenopathy  Psychiatry: [X ] AAOx3 [ ] Mood & affect appropriate  Skin: [ ] No rashes [ ] No ecchymoses [ ] No cyanosis    Labs:                        9.6    9.3   )-----------( 257      ( 18 Oct 2017 01:35 )             28.1     10-18    134<L>  |  94<L>  |  21  ----------------------------<  149<H>  4.4   |  29  |  0.77    Ca    9.2      18 Oct 2017 01:35      PT/INR - ( 18 Oct 2017 08:33 )   PT: 54.3 sec;   INR: 4.87 ratio       LDH: 327

## 2017-10-18 NOTE — PROGRESS NOTE ADULT - ATTENDING COMMENTS
s/p HMII sept.   Meds: Toprol XL 50, Lisinopril 5 QHS, Aldactone 12.5   MAP stable 70-80  Cr 0.6. K 4.6 INR 1.98   Possible d/c next week.   Increase lisinopril 7.5 today  Yonatan Werner

## 2017-10-18 NOTE — PROGRESS NOTE ADULT - SUBJECTIVE AND OBJECTIVE BOX
Interval Events:Pt denies abdominal pain, denies n/v, tolerating PO      HPI:62M with no PMHx here with chest pain. Began last week, described as pleuritic, worsens with cough and deep inspiration. Has been having SOB as well, dry cough. Recently vacationed in [facility] came back today. Saw doctor in [facility] but unclear what workup was. No fever, sick contacts, abd pain. CP described as midsternal, nonradiating. Has 25 pack year smoking history.  CTPA does not reveal PE, Pulm congestion noted.     MEDICATIONS  (STANDING):  aspirin enteric coated 81 milliGRAM(s) Oral daily  docusate sodium 100 milliGRAM(s) Oral three times a day  glycerin Suppository - Adult 1 Suppository(s) Rectal every 4 hours  lidocaine   Patch 1 Patch Transdermal daily  lidocaine   Patch 1 Patch Transdermal every 24 hours  lisinopril 5 milliGRAM(s) Oral at bedtime  metoprolol succinate ER 50 milliGRAM(s) Oral daily  mirtazapine 7.5 milliGRAM(s) Oral at bedtime  pantoprazole    Tablet 40 milliGRAM(s) Oral before breakfast  polyethylene glycol 3350 17 Gram(s) Oral at bedtime  senna 3 Tablet(s) Oral at bedtime  spironolactone 12.5 milliGRAM(s) Oral daily    MEDICATIONS  (PRN):  naloxone Injectable 0.1 milliGRAM(s) IV Push every 3 minutes PRN Sedation and respiratory depression RR < 11  oxyCODONE    IR 5 milliGRAM(s) Oral every 6 hours PRN Severe Pain (7 - 10)  phenylephrine 0.25% Suppository 1 Suppository(s) Rectal every 8 hours PRN Hemorrhoids      Allergies    No Known Allergies    Intolerances        Review of Systems:    General:  No wt loss, fevers, chills, night sweats,fatigue,   Eyes:  Good vision, no reported pain  ENT:  No sore throat, pain, runny nose, dysphagia  CV:  No pain, palpitations, hypo/hypertension  Resp:  No dyspnea, cough, tachypnea, wheezing  GI:  No pain, No nausea, No vomiting, No diarrhea, No constipation, No weight loss, No fever, No pruritis, No rectal bleeding, No melena, No dysphagia  :  No pain, bleeding, incontinence, nocturia  Muscle:  No pain, weakness  Neuro:  No weakness, tingling, memory problems  Psych:  No fatigue, insomnia, mood problems, depression  Endocrine:  No polyuria, polydypsia, cold/heat intolerance  Heme:  No petechiae, ecchymosis, easy bruisability  Skin:  No rash, tattoos, scars, edema      Vital Signs Last 24 Hrs  T(C): 36.8 (18 Oct 2017 08:25), Max: 36.8 (17 Oct 2017 23:00)  T(F): 98.2 (18 Oct 2017 08:25), Max: 98.2 (17 Oct 2017 23:00)  HR: 104 (18 Oct 2017 08:25) (96 - 109)  BP: --  BP(mean): 80 (18 Oct 2017 08:25) (74 - 82)  RR: 18 (18 Oct 2017 08:25) (17 - 18)  SpO2: 99% (18 Oct 2017 08:25) (97% - 100%)    PHYSICAL EXAM:    Constitutional: NAD, well-developed  HEENT: EOMI, throat clear  Neck: No LAD, supple  Respiratory: CTA and P  Cardiovascular: S1 and S2, RRR, no M  Gastrointestinal: BS+, soft, NT/ND, neg HSM,  Extremities: No peripheral edema, neg clubing, cyanosis  Vascular: 2+ peripheral pulses  Neurological: A/O x 3, no focal deficits  Psychiatric: Normal mood, normal affect  Skin: No rashes      LABS:                        9.6    9.3   )-----------( 257      ( 18 Oct 2017 01:35 )             28.1     10-18    134<L>  |  94<L>  |  21  ----------------------------<  149<H>  4.4   |  29  |  0.77    Ca    9.2      18 Oct 2017 01:35      PT/INR - ( 18 Oct 2017 08:33 )   PT: 54.3 sec;   INR: 4.87 ratio         PTT - ( 16 Oct 2017 13:32 )  PTT:34.7 sec      RADIOLOGY & ADDITIONAL TESTS:

## 2017-10-18 NOTE — PROGRESS NOTE ADULT - PROBLEM SELECTOR PLAN 3
S/P LVAD : doing better: care per CTS:  Heart failure team following  10/16: stable  10/18L: stable : card following

## 2017-10-18 NOTE — PROGRESS NOTE ADULT - SUBJECTIVE AND OBJECTIVE BOX
Subjective Hello ambulating in antonio no acute distress  VITAL SIGNS  Telemetry:  SR/ST     Vital Signs Last 24 Hrs  T(C): 36.7 (10-18-17 @ 13:00), Max: 36.8 (10-17-17 @ 23:00)  T(F): 98.1 (10-18-17 @ 13:00), Max: 98.2 (10-17-17 @ 23:00)  HR: 104 (10-18-17 @ 13:00) (96 - 109)  BP: --  RR: 18 (10-18-17 @ 13:00) (17 - 18)  SpO2: 97% (10-18-17 @ 13:00) (97% - 100%)     LVAD  speed 9000  flow 5.5  power5.5  PI 5.7      10-17 @ 07:01  -  10-18 @ 07:00  --------------------------------------------------------  IN: 720 mL / OUT: 950 mL / NET: -230 mL    10-18 @ 07:01  -  10-18 @ 13:20  --------------------------------------------------------  IN: 60 mL / OUT: 200 mL / NET: -140 mL    weight 58kg  MEDICATIONS  (STANDING):  aspirin enteric coated 81 milliGRAM(s) Oral daily  docusate sodium 100 milliGRAM(s) Oral three times a day  glycerin Suppository - Adult 1 Suppository(s) Rectal every 4 hours  lidocaine   Patch 1 Patch Transdermal daily  lidocaine   Patch 1 Patch Transdermal every 24 hours  lisinopril 5 milliGRAM(s) Oral at bedtime  metoprolol succinate ER 50 milliGRAM(s) Oral daily  mirtazapine 7.5 milliGRAM(s) Oral at bedtime  pantoprazole    Tablet 40 milliGRAM(s) Oral before breakfast  polyethylene glycol 3350 17 Gram(s) Oral at bedtime  senna 3 Tablet(s) Oral at bedtime  spironolactone 12.5 milliGRAM(s) Oral daily    MEDICATIONS  (PRN):  naloxone Injectable 0.1 milliGRAM(s) IV Push every 3 minutes PRN Sedation and respiratory depression RR < 11  oxyCODONE    IR 5 milliGRAM(s) Oral every 6 hours PRN Severe Pain (7 - 10)  phenylephrine 0.25% Suppository 1 Suppository(s) Rectal every 8 hours PRN Hemorrhoids      Coumadin    [x ] YES     Reason:  LVAD        PHYSICAL EXAM  Neurology: alert and oriented x 3, nonfocal, no gross deficits  CV :LVAD sounds  Sternal Wound : TAI benign   Drive line CDI  Lungs:CTA  Abdomen: soft, nontender, nondistended, positive bowel sounds,   :   Voiding          Extremities:  B/L LE warm no tenderness negative edema       Physical Therapy Rec:   Home this week    Discussed with Cardiothoracic Team at AM rounds.

## 2017-10-18 NOTE — PROGRESS NOTE ADULT - PROBLEM SELECTOR PLAN 2
stable: can start spiriva on DC , pt has not been wheezing  doing ok  10/11: pt has not been wheezing and has been stable breathing wise: BD prn  10/13: the last ct scan chest on 23 of sept showed paraseptal emphysema: and left lower lobe rounded atlectasis: he would need repeat ct scan chest in december to ensure proper retisolution of the atelectasis:  10/16: remains stable: no wheezing: oxygenation is good  10/18: stable

## 2017-10-18 NOTE — PROGRESS NOTE ADULT - PROBLEM SELECTOR PLAN 2
Continue with Toprol XL 25 qd/ Lisinopril  Continue with AC therapy on coumadin for a therapeutic INR goal 2-3  Hold coumadin tonight 10/18

## 2017-10-19 LAB
ANION GAP SERPL CALC-SCNC: 12 MMOL/L — SIGNIFICANT CHANGE UP (ref 5–17)
APTT BLD: 33.6 SEC — SIGNIFICANT CHANGE UP (ref 27.5–37.4)
BUN SERPL-MCNC: 14 MG/DL — SIGNIFICANT CHANGE UP (ref 7–23)
CALCIUM SERPL-MCNC: 9.6 MG/DL — SIGNIFICANT CHANGE UP (ref 8.4–10.5)
CHLORIDE SERPL-SCNC: 95 MMOL/L — LOW (ref 96–108)
CO2 SERPL-SCNC: 27 MMOL/L — SIGNIFICANT CHANGE UP (ref 22–31)
CREAT SERPL-MCNC: 0.63 MG/DL — SIGNIFICANT CHANGE UP (ref 0.5–1.3)
GLUCOSE BLDC GLUCOMTR-MCNC: 112 MG/DL — HIGH (ref 70–99)
GLUCOSE BLDC GLUCOMTR-MCNC: 117 MG/DL — HIGH (ref 70–99)
GLUCOSE BLDC GLUCOMTR-MCNC: 98 MG/DL — SIGNIFICANT CHANGE UP (ref 70–99)
GLUCOSE SERPL-MCNC: 103 MG/DL — HIGH (ref 70–99)
HCT VFR BLD CALC: 30.7 % — LOW (ref 39–50)
HGB BLD-MCNC: 9.9 G/DL — LOW (ref 13–17)
INR BLD: 1.98 RATIO — HIGH (ref 0.88–1.16)
LDH SERPL L TO P-CCNC: 337 U/L — HIGH (ref 50–242)
MCHC RBC-ENTMCNC: 30.9 PG — SIGNIFICANT CHANGE UP (ref 27–34)
MCHC RBC-ENTMCNC: 32.4 GM/DL — SIGNIFICANT CHANGE UP (ref 32–36)
MCV RBC AUTO: 95.4 FL — SIGNIFICANT CHANGE UP (ref 80–100)
PLATELET # BLD AUTO: 252 K/UL — SIGNIFICANT CHANGE UP (ref 150–400)
POTASSIUM SERPL-MCNC: 4.6 MMOL/L — SIGNIFICANT CHANGE UP (ref 3.5–5.3)
POTASSIUM SERPL-SCNC: 4.6 MMOL/L — SIGNIFICANT CHANGE UP (ref 3.5–5.3)
PROTHROM AB SERPL-ACNC: 21.9 SEC — HIGH (ref 9.8–12.7)
RBC # BLD: 3.22 M/UL — LOW (ref 4.2–5.8)
RBC # FLD: 13.7 % — SIGNIFICANT CHANGE UP (ref 10.3–14.5)
SODIUM SERPL-SCNC: 134 MMOL/L — LOW (ref 135–145)
WBC # BLD: 10 K/UL — SIGNIFICANT CHANGE UP (ref 3.8–10.5)
WBC # FLD AUTO: 10 K/UL — SIGNIFICANT CHANGE UP (ref 3.8–10.5)

## 2017-10-19 RX ORDER — WARFARIN SODIUM 2.5 MG/1
3 TABLET ORAL ONCE
Qty: 0 | Refills: 0 | Status: COMPLETED | OUTPATIENT
Start: 2017-10-19 | End: 2017-10-19

## 2017-10-19 RX ORDER — INSULIN GLARGINE 100 [IU]/ML
6 INJECTION, SOLUTION SUBCUTANEOUS AT BEDTIME
Qty: 0 | Refills: 0 | Status: DISCONTINUED | OUTPATIENT
Start: 2017-10-19 | End: 2017-10-19

## 2017-10-19 RX ORDER — DEXTROSE 50 % IN WATER 50 %
25 SYRINGE (ML) INTRAVENOUS ONCE
Qty: 0 | Refills: 0 | Status: DISCONTINUED | OUTPATIENT
Start: 2017-10-19 | End: 2017-10-25

## 2017-10-19 RX ORDER — GLUCAGON INJECTION, SOLUTION 0.5 MG/.1ML
1 INJECTION, SOLUTION SUBCUTANEOUS ONCE
Qty: 0 | Refills: 0 | Status: DISCONTINUED | OUTPATIENT
Start: 2017-10-19 | End: 2017-10-22

## 2017-10-19 RX ORDER — DEXTROSE 50 % IN WATER 50 %
12.5 SYRINGE (ML) INTRAVENOUS ONCE
Qty: 0 | Refills: 0 | Status: DISCONTINUED | OUTPATIENT
Start: 2017-10-19 | End: 2017-10-22

## 2017-10-19 RX ORDER — DEXTROSE 50 % IN WATER 50 %
1 SYRINGE (ML) INTRAVENOUS ONCE
Qty: 0 | Refills: 0 | Status: DISCONTINUED | OUTPATIENT
Start: 2017-10-19 | End: 2017-10-25

## 2017-10-19 RX ORDER — DEXTROSE 50 % IN WATER 50 %
25 SYRINGE (ML) INTRAVENOUS ONCE
Qty: 0 | Refills: 0 | Status: DISCONTINUED | OUTPATIENT
Start: 2017-10-19 | End: 2017-10-22

## 2017-10-19 RX ORDER — SODIUM CHLORIDE 9 MG/ML
1000 INJECTION, SOLUTION INTRAVENOUS
Qty: 0 | Refills: 0 | Status: DISCONTINUED | OUTPATIENT
Start: 2017-10-19 | End: 2017-10-22

## 2017-10-19 RX ORDER — INSULIN LISPRO 100/ML
VIAL (ML) SUBCUTANEOUS
Qty: 0 | Refills: 0 | Status: DISCONTINUED | OUTPATIENT
Start: 2017-10-19 | End: 2017-10-20

## 2017-10-19 RX ADMIN — WARFARIN SODIUM 3 MILLIGRAM(S): 2.5 TABLET ORAL at 21:56

## 2017-10-19 RX ADMIN — Medication 100 MILLIGRAM(S): at 06:32

## 2017-10-19 RX ADMIN — OXYCODONE HYDROCHLORIDE 5 MILLIGRAM(S): 5 TABLET ORAL at 02:06

## 2017-10-19 RX ADMIN — MIRTAZAPINE 7.5 MILLIGRAM(S): 45 TABLET, ORALLY DISINTEGRATING ORAL at 21:56

## 2017-10-19 RX ADMIN — Medication 50 MILLIGRAM(S): at 06:32

## 2017-10-19 RX ADMIN — OXYCODONE HYDROCHLORIDE 5 MILLIGRAM(S): 5 TABLET ORAL at 02:35

## 2017-10-19 RX ADMIN — LISINOPRIL 5 MILLIGRAM(S): 2.5 TABLET ORAL at 21:56

## 2017-10-19 RX ADMIN — PANTOPRAZOLE SODIUM 40 MILLIGRAM(S): 20 TABLET, DELAYED RELEASE ORAL at 06:32

## 2017-10-19 RX ADMIN — SPIRONOLACTONE 12.5 MILLIGRAM(S): 25 TABLET, FILM COATED ORAL at 06:32

## 2017-10-19 RX ADMIN — Medication 81 MILLIGRAM(S): at 11:29

## 2017-10-19 NOTE — PROGRESS NOTE ADULT - ASSESSMENT
62 year old man who presented with abdominal pain and was found to be in low output heart failure with cardiogenic shock (EF 10%). He underwent HM2 LVAD implantation with TV annuloplasty ring on 9/12. Hospital course complicated by left sided flank pain, which has significantly improved. Currently euvolemic and normotensive with MAP at goal. Discharge has been complicated by lack of rehab options, which we are investigating.    Family coming in from [facility] on Sunday-10/22 per  patient- will set up discharge at this time.

## 2017-10-19 NOTE — PROGRESS NOTE ADULT - SUBJECTIVE AND OBJECTIVE BOX
Behavioral Cardiology Progress Note     HPI: Mr. Quispe is a 62 year old man with no PMH, found to have a nonischemic cardiomyopathy with ACC/AHA stage D congestive heart failure who underwent LVAD/TV annuloplasty for INTERMACS 2 on 9/12/17.  Hospital course complicated by left sided flank pain, which has significantly improved. Discharge has been complicated by lack of rehab options..    Behavioral Health Assessment:     Mood: "my family will be home on Saturday."            Current stressors:   Adjustment to LVAD   Hospitalization    Family on vacation in [facility]      Support system/family support: Family support (family members -sister and her family) are currently in [facility] until 10/21/17.  Pt’s niece is an RN at [employer].       Coping strategies:  Limited; reports he tends to keep things to himself, reluctant to ask others for help.        Understanding of medical illness and treatment plan:  Good understanding of LVAD education as per LVAD coordinator.        MSE: Seen resting in bed.  A&Ox3.  Fairly related with intermittent eye contact. Thought process goal directed.  No abnormal thought content, denies s/i.  Mood: dysphoric.  Affect less irritable. Insight and judgment adequate.

## 2017-10-19 NOTE — PROGRESS NOTE ADULT - SUBJECTIVE AND OBJECTIVE BOX
VITAL SIGNS-Tele:     Vital Signs Last 24 Hrs  T(C): 36.9 (10-19-17 @ 14:06), Max: 37.1 (10-18-17 @ 18:00)  HR: 101 (10-19-17 @ 14:06) (90 - 108)  BP: --  SpO2: 98% (10-19-17 @ 14:06) (95% - 100%)         10-18 @ 07:01  -  10-19 @ 07:00  --------------------------------------------------------  IN: 480 mL / OUT: 1075 mL / NET: -595 mL    10-19 @ 07:01  -  10-19 @ 15:29  --------------------------------------------------------  IN: 360 mL / OUT: 200 mL / NET: 160 mL    Daily     Daily Weight in k.1 (19 Oct 2017 10:15)    CAPILLARY BLOOD GLUCOSE  117 (19 Oct 2017 11:31)    POCT Blood Glucose.: 117 mg/dL (19 Oct 2017 11:26)    PHYSICAL EXAM:  Neurology: alert and oriented x 3, nonfocal, no gross deficits  CV : +LVAD sounds  Sternal Wound :  CDI , Stable  Lungs: CTA  Abdomen: soft, nontender, nondistended, positive bowel sounds, last bowel movement         Extremities:     no edema  no calf tenderness    aspirin enteric coated 81 milliGRAM(s) Oral daily  docusate sodium 100 milliGRAM(s) Oral three times a day  glucagon  Injectable 1 milliGRAM(s) IntraMuscular once PRN  glycerin Suppository - Adult 1 Suppository(s) Rectal every 4 hours  insulin glargine Injectable (LANTUS) 6 Unit(s) SubCutaneous at bedtime  insulin lispro (HumaLOG) corrective regimen sliding scale   SubCutaneous three times a day before meals  lidocaine   Patch 1 Patch Transdermal every 24 hours  lisinopril 5 milliGRAM(s) Oral at bedtime  metoprolol succinate ER 50 milliGRAM(s) Oral daily  mirtazapine 7.5 milliGRAM(s) Oral at bedtime  naloxone Injectable 0.1 milliGRAM(s) IV Push every 3 minutes PRN  oxyCODONE    IR 5 milliGRAM(s) Oral every 6 hours PRN  pantoprazole    Tablet 40 milliGRAM(s) Oral before breakfast  phenylephrine 0.25% Suppository 1 Suppository(s) Rectal every 8 hours PRN  polyethylene glycol 3350 17 Gram(s) Oral at bedtime  senna 3 Tablet(s) Oral at bedtime  spironolactone 12.5 milliGRAM(s) Oral daily      Physical Therapy Rec:   Home  [  ]   Home w/ PT  [ x ]  Rehab  [  ]  Discussed with Cardiothoracic Team at AM rounds.

## 2017-10-19 NOTE — PROGRESS NOTE ADULT - SUBJECTIVE AND OBJECTIVE BOX
Chief complaint  Patient is a 62y old  Male who presents with a chief complaint of SOOB (25 Aug 2017 22:27)   Review of systems  Patient in bed, comfortable, no fever,  no hypoglycemia.    Labs and Fingersticks    CAPILLARY BLOOD GLUCOSE  117 (19 Oct 2017 11:31)    Anion Gap, Serum: 12 (10-19 @ 05:58)  Anion Gap, Serum: 11 (10-18 @ 01:35)      Calcium, Total Serum: 9.6 (10-19 @ 05:58)  Calcium, Total Serum: 9.2 (10-18 @ 01:35)          10-19    134<L>  |  95<L>  |  14  ----------------------------<  103<H>  4.6   |  27  |  0.63    Ca    9.6      19 Oct 2017 05:58                          9.9    10.0  )-----------( 252      ( 19 Oct 2017 05:58 )             30.7     Medications  MEDICATIONS  (STANDING):  aspirin enteric coated 81 milliGRAM(s) Oral daily  dextrose 5%. 1000 milliLiter(s) (50 mL/Hr) IV Continuous <Continuous>  dextrose 50% Injectable 12.5 Gram(s) IV Push once  dextrose 50% Injectable 25 Gram(s) IV Push once  dextrose 50% Injectable 25 Gram(s) IV Push once  docusate sodium 100 milliGRAM(s) Oral three times a day  glycerin Suppository - Adult 1 Suppository(s) Rectal every 4 hours  insulin glargine Injectable (LANTUS) 6 Unit(s) SubCutaneous at bedtime  insulin lispro (HumaLOG) corrective regimen sliding scale   SubCutaneous three times a day before meals  lidocaine   Patch 1 Patch Transdermal daily  lidocaine   Patch 1 Patch Transdermal every 24 hours  lisinopril 5 milliGRAM(s) Oral at bedtime  metoprolol succinate ER 50 milliGRAM(s) Oral daily  mirtazapine 7.5 milliGRAM(s) Oral at bedtime  pantoprazole    Tablet 40 milliGRAM(s) Oral before breakfast  polyethylene glycol 3350 17 Gram(s) Oral at bedtime  senna 3 Tablet(s) Oral at bedtime  spironolactone 12.5 milliGRAM(s) Oral daily  warfarin 3 milliGRAM(s) Oral once      Physical Exam  General: Patient comfortable in bed  Vital Signs Last 12 Hrs  T(F): 98.9 (10-19-17 @ 20:13), Max: 98.9 (10-19-17 @ 20:13)  HR: 106 (10-19-17 @ 17:57) (101 - 106)  BP: --  BP(mean): 70 (10-19-17 @ 17:57) (70 - 86)  RR: 20 (10-19-17 @ 17:57) (18 - 20)  SpO2: 97% (10-19-17 @ 17:57) (97% - 100%)  Neck: No palpable thyroid nodules.  CVS: S1S2, No murmurs  Respiratory: No wheezing, no crepitations  GI: Abdomen soft, bowel sounds positive  Musculoskeletal: Positive edema lower extremities bilaterally  Skin: No skin rashes, no ecchymosis    Diagnostics    Osmolality, Random Urine: Routine  Cancel Reason: Lab Operations Cancel (10-06 @ 10:48)  Osmolality, Serum: Routine (10-06 @ 10:48)  Sodium, Random Urine: Routine (10-06 @ 10:48)

## 2017-10-19 NOTE — PROGRESS NOTE ADULT - ASSESSMENT
Hyperglycemia: will start patient on FS tseting and Humalog coverage will FU  Hyponatremia: Patient is euthyroid and not adrenally insufficient, has SIADH, on fluid restriction, hyponatremia improved,  FU   CHF: continue treatment, cardiology team FU

## 2017-10-19 NOTE — CHART NOTE - NSCHARTNOTEFT_GEN_A_CORE
Source: Patient [X ]    Family [ ]     other [X ] RN, Medical record    Pt seen for malnutrition follow up. Pt seen sitting in chair, reports not feeling well, states his stomach is bothering him; RN made aware. Pt reports poor appetite today due to nausea, states suboptimal PO intake on 10/18 due to constipation. Pt reports 1 BM yesterday. Diet education deferred at this time due to poor PO intake and not feeling well. Noted Ensure enlive untouched at bedside.     Per chart, Pt with Stage D HF s/p LVAD placement with ongoing support and education. Discharge planning.     Diet : Regular, Ensure enlive x3      Patient reports [ X] nausea [ X] constipation       PO intake:  20-50%    Source for PO intake [X ] Patient [ ] family [ X] chart [ ] staff [ ] othe      Current Weight: 55.1kg, admission Wt: 61kg, Stable.   % Weight Change    Pertinent Medications: MEDICATIONS  (STANDING):  aspirin enteric coated 81 milliGRAM(s) Oral daily  dextrose 5%. 1000 milliLiter(s) (50 mL/Hr) IV Continuous <Continuous>  dextrose 50% Injectable 12.5 Gram(s) IV Push once  dextrose 50% Injectable 25 Gram(s) IV Push once  dextrose 50% Injectable 25 Gram(s) IV Push once  docusate sodium 100 milliGRAM(s) Oral three times a day  glycerin Suppository - Adult 1 Suppository(s) Rectal every 4 hours  insulin glargine Injectable (LANTUS) 6 Unit(s) SubCutaneous at bedtime  insulin lispro (HumaLOG) corrective regimen sliding scale   SubCutaneous three times a day before meals  lidocaine   Patch 1 Patch Transdermal daily  lidocaine   Patch 1 Patch Transdermal every 24 hours  lisinopril 5 milliGRAM(s) Oral at bedtime  metoprolol succinate ER 50 milliGRAM(s) Oral daily  mirtazapine 7.5 milliGRAM(s) Oral at bedtime  pantoprazole    Tablet 40 milliGRAM(s) Oral before breakfast  polyethylene glycol 3350 17 Gram(s) Oral at bedtime  senna 3 Tablet(s) Oral at bedtime  spironolactone 12.5 milliGRAM(s) Oral daily  Coumadin     MEDICATIONS  (PRN):  dextrose Gel 1 Dose(s) Oral once PRN Blood Glucose LESS THAN 70 milliGRAM(s)/deciLiter  glucagon  Injectable 1 milliGRAM(s) IntraMuscular once PRN Glucose <70 milliGRAM(s)/deciLiter  naloxone Injectable 0.1 milliGRAM(s) IV Push every 3 minutes PRN Sedation and respiratory depression RR < 11  oxyCODONE    IR 5 milliGRAM(s) Oral every 6 hours PRN Severe Pain (7 - 10)  phenylephrine 0.25% Suppository 1 Suppository(s) Rectal every 8 hours PRN Hemorrhoids    Pertinent Labs:  Hgb/Hct:9.9/30.7-low, Na:134-low , K:4.6, Cl:95-low, BUN:14, Cr:0.63, Glucose:103-high, Ca:9.6, LDH: 337-high    Skin: intact     Estimated Needs:   [X ] no change since previous assessment  [ ] recalculated:       Previous Nutrition Diagnosis:     X ] Increased Nutrient Needs    [ X] Food & Nutrition Related Knowledge Deficit   [ X] Malnutrition          Nutrition Diagnosis is [X ] ongoing  [ ] resolved [ ] not applicable          New Nutrition Diagnosis: [X ] not applicable      Recommend    1. Provide food preferences as requested by Pt/family within diet restrictions    2. Encourage PO intake during meal times   3. Continue with HF and coumadin education reinforcement  4. Continue Ensure enlive x3     Monitoring and Evaluation:     [ X] PO intake [X ] Tolerance to diet prescription [X ] weights [X ] follow up per protocol    [ X] other: RD remains available Sarah Siegler RD. Pager #[phone]

## 2017-10-19 NOTE — PROGRESS NOTE ADULT - SUBJECTIVE AND OBJECTIVE BOX
Interval Events: Pt denies abdominal pain, denies n/v, tolerating PO, having bms     HPI:62M with no PMHx here with chest pain. Began last week, described as pleuritic, worsens with cough and deep inspiration. Has been having SOB as well, dry cough. Recently vacationed in [facility] came back today. Saw doctor in [facility] but unclear what workup was. No fever, sick contacts, abd pain. CP described as midsternal, nonradiating. Has 25 pack year smoking history.  CTPA does not reveal PE, Pulm congestion noted.     MEDICATIONS  (STANDING):  aspirin enteric coated 81 milliGRAM(s) Oral daily  dextrose 5%. 1000 milliLiter(s) (50 mL/Hr) IV Continuous <Continuous>  dextrose 50% Injectable 12.5 Gram(s) IV Push once  dextrose 50% Injectable 25 Gram(s) IV Push once  dextrose 50% Injectable 25 Gram(s) IV Push once  docusate sodium 100 milliGRAM(s) Oral three times a day  glycerin Suppository - Adult 1 Suppository(s) Rectal every 4 hours  insulin glargine Injectable (LANTUS) 6 Unit(s) SubCutaneous at bedtime  insulin lispro (HumaLOG) corrective regimen sliding scale   SubCutaneous three times a day before meals  lidocaine   Patch 1 Patch Transdermal daily  lidocaine   Patch 1 Patch Transdermal every 24 hours  lisinopril 5 milliGRAM(s) Oral at bedtime  metoprolol succinate ER 50 milliGRAM(s) Oral daily  mirtazapine 7.5 milliGRAM(s) Oral at bedtime  pantoprazole    Tablet 40 milliGRAM(s) Oral before breakfast  polyethylene glycol 3350 17 Gram(s) Oral at bedtime  senna 3 Tablet(s) Oral at bedtime  spironolactone 12.5 milliGRAM(s) Oral daily    MEDICATIONS  (PRN):  dextrose Gel 1 Dose(s) Oral once PRN Blood Glucose LESS THAN 70 milliGRAM(s)/deciLiter  glucagon  Injectable 1 milliGRAM(s) IntraMuscular once PRN Glucose <70 milliGRAM(s)/deciLiter  naloxone Injectable 0.1 milliGRAM(s) IV Push every 3 minutes PRN Sedation and respiratory depression RR < 11  oxyCODONE    IR 5 milliGRAM(s) Oral every 6 hours PRN Severe Pain (7 - 10)  phenylephrine 0.25% Suppository 1 Suppository(s) Rectal every 8 hours PRN Hemorrhoids      Allergies    No Known Allergies    Intolerances        Review of Systems:    General:  No wt loss, fevers, chills, night sweats,fatigue,   Eyes:  Good vision, no reported pain  ENT:  No sore throat, pain, runny nose, dysphagia  CV:  No pain, palpitations, hypo/hypertension  Resp:  No dyspnea, cough, tachypnea, wheezing  GI:  No pain, No nausea, No vomiting, No diarrhea, No constipation, No weight loss, No fever, No pruritis, No rectal bleeding, No melena, No dysphagia  :  No pain, bleeding, incontinence, nocturia  Muscle:  No pain, weakness  Neuro:  No weakness, tingling, memory problems  Psych:  No fatigue, insomnia, mood problems, depression  Endocrine:  No polyuria, polydypsia, cold/heat intolerance  Heme:  No petechiae, ecchymosis, easy bruisability  Skin:  No rash, tattoos, scars, edema      Vital Signs Last 24 Hrs  T(C): 36.7 (19 Oct 2017 10:15), Max: 37.1 (18 Oct 2017 18:00)  T(F): 98.1 (19 Oct 2017 10:15), Max: 98.8 (18 Oct 2017 18:00)  HR: 105 (19 Oct 2017 10:15) (90 - 108)  BP: --  BP(mean): 86 (19 Oct 2017 10:15) (72 - 86)  RR: 18 (19 Oct 2017 10:15) (18 - 19)  SpO2: 100% (19 Oct 2017 10:15) (95% - 100%)    PHYSICAL EXAM:    Constitutional: NAD, well-developed  HEENT: EOMI, throat clear  Neck: No LAD, supple  Respiratory: CTA and P  Cardiovascular: S1 and S2, RRR, no M  Gastrointestinal: BS+, soft, NT/ND, neg HSM,  Extremities: No peripheral edema, neg clubing, cyanosis  Vascular: 2+ peripheral pulses  Neurological: A/O x 3, no focal deficits  Psychiatric: Normal mood, normal affect  Skin: No rashes      LABS:                        9.9    10.0  )-----------( 252      ( 19 Oct 2017 05:58 )             30.7     10-19    134<L>  |  95<L>  |  14  ----------------------------<  103<H>  4.6   |  27  |  0.63    Ca    9.6      19 Oct 2017 05:58      PT/INR - ( 19 Oct 2017 05:58 )   PT: 21.9 sec;   INR: 1.98 ratio         PTT - ( 19 Oct 2017 05:58 )  PTT:33.6 sec      RADIOLOGY & ADDITIONAL TESTS:

## 2017-10-20 LAB
ANION GAP SERPL CALC-SCNC: 10 MMOL/L — SIGNIFICANT CHANGE UP (ref 5–17)
APTT BLD: 33.6 SEC — SIGNIFICANT CHANGE UP (ref 27.5–37.4)
BUN SERPL-MCNC: 16 MG/DL — SIGNIFICANT CHANGE UP (ref 7–23)
CALCIUM SERPL-MCNC: 9.8 MG/DL — SIGNIFICANT CHANGE UP (ref 8.4–10.5)
CHLORIDE SERPL-SCNC: 96 MMOL/L — SIGNIFICANT CHANGE UP (ref 96–108)
CO2 SERPL-SCNC: 30 MMOL/L — SIGNIFICANT CHANGE UP (ref 22–31)
CREAT SERPL-MCNC: 0.71 MG/DL — SIGNIFICANT CHANGE UP (ref 0.5–1.3)
GLUCOSE BLDC GLUCOMTR-MCNC: 89 MG/DL — SIGNIFICANT CHANGE UP (ref 70–99)
GLUCOSE SERPL-MCNC: 89 MG/DL — SIGNIFICANT CHANGE UP (ref 70–99)
HCT VFR BLD CALC: 29 % — LOW (ref 39–50)
HGB BLD-MCNC: 9.5 G/DL — LOW (ref 13–17)
INR BLD: 1.68 RATIO — HIGH (ref 0.88–1.16)
LDH SERPL L TO P-CCNC: 331 U/L — HIGH (ref 50–242)
LDH SERPL L TO P-CCNC: 421 U/L — HIGH (ref 50–242)
MCHC RBC-ENTMCNC: 31.2 PG — SIGNIFICANT CHANGE UP (ref 27–34)
MCHC RBC-ENTMCNC: 32.8 GM/DL — SIGNIFICANT CHANGE UP (ref 32–36)
MCV RBC AUTO: 95.3 FL — SIGNIFICANT CHANGE UP (ref 80–100)
PLATELET # BLD AUTO: 297 K/UL — SIGNIFICANT CHANGE UP (ref 150–400)
POTASSIUM SERPL-MCNC: 4.4 MMOL/L — SIGNIFICANT CHANGE UP (ref 3.5–5.3)
POTASSIUM SERPL-SCNC: 4.4 MMOL/L — SIGNIFICANT CHANGE UP (ref 3.5–5.3)
PROTHROM AB SERPL-ACNC: 18.3 SEC — HIGH (ref 9.8–12.7)
RBC # BLD: 3.04 M/UL — LOW (ref 4.2–5.8)
RBC # FLD: 13.4 % — SIGNIFICANT CHANGE UP (ref 10.3–14.5)
SODIUM SERPL-SCNC: 136 MMOL/L — SIGNIFICANT CHANGE UP (ref 135–145)
WBC # BLD: 10.9 K/UL — HIGH (ref 3.8–10.5)
WBC # FLD AUTO: 10.9 K/UL — HIGH (ref 3.8–10.5)

## 2017-10-20 RX ORDER — WARFARIN SODIUM 2.5 MG/1
5 TABLET ORAL ONCE
Qty: 0 | Refills: 0 | Status: COMPLETED | OUTPATIENT
Start: 2017-10-20 | End: 2017-10-20

## 2017-10-20 RX ORDER — LISINOPRIL 2.5 MG/1
7.5 TABLET ORAL AT BEDTIME
Qty: 0 | Refills: 0 | Status: DISCONTINUED | OUTPATIENT
Start: 2017-10-20 | End: 2017-10-25

## 2017-10-20 RX ORDER — WARFARIN SODIUM 2.5 MG/1
4 TABLET ORAL ONCE
Qty: 0 | Refills: 0 | Status: DISCONTINUED | OUTPATIENT
Start: 2017-10-20 | End: 2017-10-20

## 2017-10-20 RX ADMIN — OXYCODONE HYDROCHLORIDE 5 MILLIGRAM(S): 5 TABLET ORAL at 21:29

## 2017-10-20 RX ADMIN — PANTOPRAZOLE SODIUM 40 MILLIGRAM(S): 20 TABLET, DELAYED RELEASE ORAL at 06:20

## 2017-10-20 RX ADMIN — Medication 100 MILLIGRAM(S): at 13:27

## 2017-10-20 RX ADMIN — MIRTAZAPINE 7.5 MILLIGRAM(S): 45 TABLET, ORALLY DISINTEGRATING ORAL at 21:28

## 2017-10-20 RX ADMIN — Medication 100 MILLIGRAM(S): at 06:19

## 2017-10-20 RX ADMIN — LISINOPRIL 7.5 MILLIGRAM(S): 2.5 TABLET ORAL at 21:28

## 2017-10-20 RX ADMIN — Medication 50 MILLIGRAM(S): at 06:19

## 2017-10-20 RX ADMIN — POLYETHYLENE GLYCOL 3350 17 GRAM(S): 17 POWDER, FOR SOLUTION ORAL at 21:28

## 2017-10-20 RX ADMIN — WARFARIN SODIUM 5 MILLIGRAM(S): 2.5 TABLET ORAL at 21:29

## 2017-10-20 RX ADMIN — OXYCODONE HYDROCHLORIDE 5 MILLIGRAM(S): 5 TABLET ORAL at 22:14

## 2017-10-20 RX ADMIN — Medication 81 MILLIGRAM(S): at 13:26

## 2017-10-20 RX ADMIN — Medication 100 MILLIGRAM(S): at 21:28

## 2017-10-20 RX ADMIN — SENNA PLUS 3 TABLET(S): 8.6 TABLET ORAL at 21:29

## 2017-10-20 RX ADMIN — SPIRONOLACTONE 12.5 MILLIGRAM(S): 25 TABLET, FILM COATED ORAL at 06:19

## 2017-10-20 NOTE — PROGRESS NOTE ADULT - SUBJECTIVE AND OBJECTIVE BOX
Patient is a 62y old  Male who presents with a chief complaint of SOOB (25 Aug 2017 22:27)  pt is sitting on chair   no sob   no abdominal pain    Any change in ROS:     MEDICATIONS  (STANDING):  aspirin enteric coated 81 milliGRAM(s) Oral daily  dextrose 5%. 1000 milliLiter(s) (50 mL/Hr) IV Continuous <Continuous>  dextrose 50% Injectable 12.5 Gram(s) IV Push once  dextrose 50% Injectable 25 Gram(s) IV Push once  dextrose 50% Injectable 25 Gram(s) IV Push once  docusate sodium 100 milliGRAM(s) Oral three times a day  glycerin Suppository - Adult 1 Suppository(s) Rectal every 4 hours  lidocaine   Patch 1 Patch Transdermal daily  lidocaine   Patch 1 Patch Transdermal every 24 hours  lisinopril 5 milliGRAM(s) Oral at bedtime  metoprolol succinate ER 50 milliGRAM(s) Oral daily  mirtazapine 7.5 milliGRAM(s) Oral at bedtime  pantoprazole    Tablet 40 milliGRAM(s) Oral before breakfast  polyethylene glycol 3350 17 Gram(s) Oral at bedtime  senna 3 Tablet(s) Oral at bedtime  spironolactone 12.5 milliGRAM(s) Oral daily    MEDICATIONS  (PRN):  dextrose Gel 1 Dose(s) Oral once PRN Blood Glucose LESS THAN 70 milliGRAM(s)/deciLiter  glucagon  Injectable 1 milliGRAM(s) IntraMuscular once PRN Glucose <70 milliGRAM(s)/deciLiter  naloxone Injectable 0.1 milliGRAM(s) IV Push every 3 minutes PRN Sedation and respiratory depression RR < 11  oxyCODONE    IR 5 milliGRAM(s) Oral every 6 hours PRN Severe Pain (7 - 10)  phenylephrine 0.25% Suppository 1 Suppository(s) Rectal every 8 hours PRN Hemorrhoids    Vital Signs Last 24 Hrs  T(C): 36.8 (20 Oct 2017 09:53), Max: 37.2 (19 Oct 2017 20:13)  T(F): 98.3 (20 Oct 2017 09:53), Max: 98.9 (19 Oct 2017 20:13)  HR: 96 (20 Oct 2017 09:53) (96 - 106)  BP: --  BP(mean): 78 (20 Oct 2017 09:53) (68 - 80)  RR: 20 (20 Oct 2017 09:53) (20 - 20)  SpO2: 95% (20 Oct 2017 09:53) (95% - 98%)    I&O's Summary    19 Oct 2017 07:01  -  20 Oct 2017 07:00  --------------------------------------------------------  IN: 770 mL / OUT: 600 mL / NET: 170 mL          Physical Exam:   GENERAL: cachectic  HEENT: ALONSO/   Atraumatic, Normocephalic  ENMT: No tonsillar erythema, exudates, or enlargement; Moist mucous membranes, Good dentition, No lesions  NECK: Supple, No JVD, Normal thyroid  CHEST/LUNG: Clear to auscultaion, ; No rales, rhonchi, wheezing, or rubs  CVS: Regular rate and rhythm; No murmurs, rubs, or gallops  GI: : Soft, Nontender, Nondistended; Bowel sounds present  NERVOUS SYSTEM:  Alert & Oriented X3  EXTREMITIES:  -edema  LYMPH: No lymphadenopathy noted  SKIN: No rashes or lesions  ENDOCRINOLOGY: No Thyromegaly  PSYCH: Appropriate    Labs:                              9.5    10.9  )-----------( 297      ( 20 Oct 2017 06:00 )             29.0                         9.9    10.0  )-----------( 252      ( 19 Oct 2017 05:58 )             30.7                         9.6    9.3   )-----------( 257      ( 18 Oct 2017 01:35 )             28.1                         9.8    9.6   )-----------( 269      ( 17 Oct 2017 04:40 )             28.6                         11.3   9.4   )-----------( 276      ( 16 Oct 2017 13:32 )             33.7     10-20    136  |  96  |  16  ----------------------------<  89  4.4   |  30  |  0.71  10-19    134<L>  |  95<L>  |  14  ----------------------------<  103<H>  4.6   |  27  |  0.63  10-18    134<L>  |  94<L>  |  21  ----------------------------<  149<H>  4.4   |  29  |  0.77  10-17    132<L>  |  94<L>  |  14  ----------------------------<  90  4.7   |  28  |  0.65  10-16    133<L>  |  94<L>  |  18  ----------------------------<  104<H>  4.7   |  28  |  0.76    Ca    9.8      20 Oct 2017 06:00  Ca    9.6      19 Oct 2017 05:58      CAPILLARY BLOOD GLUCOSE      POCT Blood Glucose.: 89 mg/dL (20 Oct 2017 07:46)  POCT Blood Glucose.: 98 mg/dL (19 Oct 2017 21:42)  POCT Blood Glucose.: 112 mg/dL (19 Oct 2017 15:59)        PT/INR - ( 20 Oct 2017 06:35 )   PT: 18.3 sec;   INR: 1.68 ratio         PTT - ( 20 Oct 2017 06:35 )  PTT:33.6 sec    Cultures:         < from: Xray Chest 1 View AP- PORTABLE-Urgent (09.29.17 @ 16:37) >  E:  09/29/2017            INTERPRETATION:  AP chest with comparison to 1347 hours from the same day.    IMPRESSION: Left pigtail catheter removed.    IMPRESSION: Left-sided pigtail catheter has been removed. There is no   pneumothorax. The heart is normal in size. The patient is status post   median sternotomy. The lungs are clear.                    TIA BENAVIDES M.D., ATTENDING RADIOLOGIST  This document has been electronically signed. Sep 30 2017  4:18PM    < end of copied text >            < from: CT Angio Chest w/ IV Cont (09.24.17 @ 17:10) >  ghtly distended with small amount of nondependent air.  REPRODUCTIVE ORGANS: The prostate is within normal limits.    BOWEL: No bowel obstruction.   PERITONEUM: No ascites.  VESSELS:Atherosclerotic calcifications. Normal patency of the abdominal   aorta, no aneurysm.  RETROPERITONEUM: No lymphadenopathy.    ABDOMINAL WALL: Within normal limits.  BONES: Status post sternotomy. Degenerative spinal changes most prominent   at the level of L4 and L5 with disc height loss and mild retrolisthesis   of L4 on L5.    IMPRESSION:     Unchanged small left loculated pleural effusion with associated   compressive atelectasis of the left lower lobe.    Small amount of air within the bladder. Correlate for recent   instrumentation and with urinalysis to exclude infection.                  KEILA PORTER M.D., RADIOLOGY RESIDENT  This document has been electronically signed.  LISE GREGORY M.D., ATTENDING RADIOLOGIST  This document has beenelectronically signed. Sep 25 2017 12:56PM        < end of copied text >            Studies  Chest X-RAY  CT SCAN Chest   Venous Dopplers: LE:   CT Abdomen  Others

## 2017-10-20 NOTE — PROGRESS NOTE ADULT - PROBLEM SELECTOR PLAN 3
S/P LVAD : doing better: care per CTS:  Heart failure team following  10/16: stable  10/18L: stable : card following  10/20: stable : cont current treatment: s/p LVAD: stable

## 2017-10-20 NOTE — PROGRESS NOTE ADULT - ASSESSMENT
63 y/o Creole speaking male  presented with abdominal pain and  found to be in low output heart failure with cardiogenic shock (EF 10%) s/p 9/12 LVAD/TV annuloplasty for INTERMACS 2   Postop Course:   Acute blood loss anemia 2/2 blood loss postop -->requiring PRBC, amicar, PLT, cryo.  TTE 9/13 LVEDD 5.9 cm, mild-mod MR, aortic valve closed, mild RV dysfunction, mild TR.   c/o L sided pain; found to have L hemithorax s/p pigtail that was removed; fluid c/w exudative with lot of RBC,   (+) Hyponatremia - renal consulted- urine studies sent / Endocrine following for SIADH work up  10/6 Random Urine: 49; Patient euthyroid and not adrenally insufficient -->Patient dx with SIADH, on fluid restriction.  (+) depressive mood --> psych following; Remeron initiated; started at 7.5 mg HS; avoid up titrating 2/2 risk of worsening hyponatremia    LVAD interrogated w/o events.   10/9 Small amount of rectal bleeding after bowel movement; (+) internal hemorrhoids --> Continue bowel regimen / Start Preparation H suppository TID PRN.   No further bleeding noted. pt ambulating with a walker.  Psych follow up pending  Lisinopril d/c yesterday for hypotension, stable  Pain meds increased by palliative  10/12  coumadin dosing, na improving 134, ENDO for siadh, old ivl out refusing new ivl  10/13   coumadin dosing,   lisinopril added per HF  10/14   VSS   ambulating  10/15  VSS   INR down to 1.78   repat INR this afternoon   ?Hep GTT  10/16 INR > 2- no heparin gttp indicated.  continue meds as per CHF team; vss; pt stable; MAP 76   10/17 INR>2 D/W patient family returning from [facility] on this week  pending discharge to home ambulating coumadin 5 tonight   discharge planning to home vs rehab pending placement 63 y/o Creole speaking male  presented with abdominal pain and  found to be in low output heart failure with cardiogenic shock (EF 10%) s/p 9/12 LVAD/TV annuloplasty for INTERMACS 2   Postop Course:   Acute blood loss anemia 2/2 blood loss postop -->requiring PRBC, amicar, PLT, cryo.  TTE 9/13 LVEDD 5.9 cm, mild-mod MR, aortic valve closed, mild RV dysfunction, mild TR.   c/o L sided pain; found to have L hemithorax s/p pigtail that was removed; fluid c/w exudative with lot of RBC,   (+) Hyponatremia - renal consulted- urine studies sent / Endocrine following for SIADH work up  10/6 Random Urine: 49; Patient euthyroid and not adrenally insufficient -->Patient dx with SIADH, on fluid restriction.  (+) depressive mood --> psych following; Remeron initiated; started at 7.5 mg HS; avoid up titrating 2/2 risk of worsening hyponatremia    LVAD interrogated w/o events.   10/9 Small amount of rectal bleeding after bowel movement; (+) internal hemorrhoids --> Continue bowel regimen / Start Preparation H suppository TID PRN.   No further bleeding noted. pt ambulating with a walker.  Psych follow up pending  Lisinopril d/c yesterday for hypotension, stable  Pain meds increased by palliative  10/12  coumadin dosing, na improving 134, ENDO for siadh, old ivl out refusing new ivl  10/13   coumadin dosing,   lisinopril added per HF  10/14   VSS   ambulating  10/15  VSS   INR down to 1.78   repat INR this afternoon   ?Hep GTT  10/16 INR > 2- no heparin gttp indicated.  continue meds as per CHF team; vss; pt stable; MAP 76   10/17 INR>2 D/W patient family returning from [facility] on this week  pending discharge to home ambulating coumadin 5 tonight   discharge planning to home vs rehab pending placement   10/20 63 y/o Creole speaking male  presented with abdominal pain and  found to be in low output heart failure with cardiogenic shock (EF 10%) s/p 9/12 LVAD/TV annuloplasty for INTERMACS 2   Postop Course:   Acute blood loss anemia 2/2 blood loss postop -->requiring PRBC, amicar, PLT, cryo.  TTE 9/13 LVEDD 5.9 cm, mild-mod MR, aortic valve closed, mild RV dysfunction, mild TR.   c/o L sided pain; found to have L hemithorax s/p pigtail that was removed; fluid c/w exudative with lot of RBC,   (+) Hyponatremia - renal consulted- urine studies sent / Endocrine following for SIADH work up  10/6 Random Urine: 49; Patient euthyroid and not adrenally insufficient -->Patient dx with SIADH, on fluid restriction.  (+) depressive mood --> psych following; Remeron initiated; started at 7.5 mg HS; avoid up titrating 2/2 risk of worsening hyponatremia    LVAD interrogated w/o events.   10/9 Small amount of rectal bleeding after bowel movement; (+) internal hemorrhoids --> Continue bowel regimen / Start Preparation H suppository TID PRN.   No further bleeding noted. pt ambulating with a walker.  Psych follow up pending  Lisinopril d/c yesterday for hypotension, stable  Pain meds increased by palliative  10/12  coumadin dosing, na improving 134, ENDO for siadh, old ivl out refusing new ivl  10/13   coumadin dosing,   lisinopril added per HF  10/14   VSS   ambulating  10/15  VSS   INR down to 1.78   repat INR this afternoon   ?Hep GTT  10/16 INR > 2- no heparin gttp indicated.  continue meds as per CHF team; vss; pt stable; MAP 76   10/17 INR>2 D/W patient family returning from [facility] on this week  pending discharge to home ambulating coumadin 5 tonight   discharge planning to home vs rehab pending placement   10/20 INR 1.6 - coumadin 4 mg tonight- discharge planning- home 61 y/o Creole speaking male  presented with abdominal pain and  found to be in low output heart failure with cardiogenic shock (EF 10%) s/p 9/12 LVAD/TV annuloplasty for INTERMACS 2   Postop Course:   Acute blood loss anemia 2/2 blood loss postop -->requiring PRBC, amicar, PLT, cryo.  TTE 9/13 LVEDD 5.9 cm, mild-mod MR, aortic valve closed, mild RV dysfunction, mild TR.   c/o L sided pain; found to have L hemithorax s/p pigtail that was removed; fluid c/w exudative with lot of RBC,   (+) Hyponatremia - renal consulted- urine studies sent / Endocrine following for SIADH work up  10/6 Random Urine: 49; Patient euthyroid and not adrenally insufficient -->Patient dx with SIADH, on fluid restriction.  (+) depressive mood --> psych following; Remeron initiated; started at 7.5 mg HS; avoid up titrating 2/2 risk of worsening hyponatremia    LVAD interrogated w/o events.   10/9 Small amount of rectal bleeding after bowel movement; (+) internal hemorrhoids --> Continue bowel regimen / Start Preparation H suppository TID PRN.   No further bleeding noted. pt ambulating with a walker.  Psych follow up pending  Lisinopril d/c yesterday for hypotension, stable  Pain meds increased by palliative  10/12  coumadin dosing, na improving 134, ENDO for siadh, old ivl out refusing new ivl  10/13   coumadin dosing,   lisinopril added per HF  10/14   VSS   ambulating  10/15  VSS   INR down to 1.78   repat INR this afternoon   ?Hep GTT  10/16 INR > 2- no heparin gttp indicated.  continue meds as per CHF team; vss; pt stable; MAP 76   10/17 INR>2 D/W patient family returning from [facility] on this week  pending discharge to home ambulating coumadin 5 tonight   discharge planning to home vs rehab pending placement   10/20 INR 1.6 - coumadin 4 mg tonight;  and MAP 70;s  discharge planning- home

## 2017-10-20 NOTE — PROGRESS NOTE ADULT - SUBJECTIVE AND OBJECTIVE BOX
Chief complaint  Patient is a 62y old  Male who presents with a chief complaint of SOOB (25 Aug 2017 22:27)   Review of systems  Patient in bed, comfortable, no fever,  no hypoglycemia.    Labs and Fingersticks    CAPILLARY BLOOD GLUCOSE  117 (19 Oct 2017 11:31)    Anion Gap, Serum: 10 (10-20 @ 06:00)  Anion Gap, Serum: 12 (10-19 @ 05:58)      Calcium, Total Serum: 9.8 (10-20 @ 06:00)  Calcium, Total Serum: 9.6 (10-19 @ 05:58)          10-20    136  |  96  |  16  ----------------------------<  89  4.4   |  30  |  0.71    Ca    9.8      20 Oct 2017 06:00                          9.5    10.9  )-----------( 297      ( 20 Oct 2017 06:00 )             29.0     Medications  MEDICATIONS  (STANDING):  aspirin enteric coated 81 milliGRAM(s) Oral daily  dextrose 5%. 1000 milliLiter(s) (50 mL/Hr) IV Continuous <Continuous>  dextrose 50% Injectable 12.5 Gram(s) IV Push once  dextrose 50% Injectable 25 Gram(s) IV Push once  dextrose 50% Injectable 25 Gram(s) IV Push once  docusate sodium 100 milliGRAM(s) Oral three times a day  glycerin Suppository - Adult 1 Suppository(s) Rectal every 4 hours  lidocaine   Patch 1 Patch Transdermal daily  lidocaine   Patch 1 Patch Transdermal every 24 hours  lisinopril 5 milliGRAM(s) Oral at bedtime  metoprolol succinate ER 50 milliGRAM(s) Oral daily  mirtazapine 7.5 milliGRAM(s) Oral at bedtime  pantoprazole    Tablet 40 milliGRAM(s) Oral before breakfast  polyethylene glycol 3350 17 Gram(s) Oral at bedtime  senna 3 Tablet(s) Oral at bedtime  spironolactone 12.5 milliGRAM(s) Oral daily      Physical Exam  General: Patient comfortable in bed  Vital Signs Last 12 Hrs  T(F): 98.3 (10-20-17 @ 09:53), Max: 98.6 (10-20-17 @ 02:00)  HR: 96 (10-20-17 @ 09:53) (96 - 97)  BP: --  BP(mean): 78 (10-20-17 @ 09:53) (72 - 80)  RR: 20 (10-20-17 @ 09:53) (20 - 20)  SpO2: 95% (10-20-17 @ 09:53) (95% - 95%)  Neck: No palpable thyroid nodules.  CVS: S1S2, No murmurs  Respiratory: No wheezing, no crepitations  GI: Abdomen soft, bowel sounds positive  Musculoskeletal: Positive edema lower extremities bilaterally  Skin: No skin rashes, no ecchymosis    Diagnostics    Osmolality, Random Urine: Routine  Cancel Reason: Lab Operations Cancel (10-06 @ 10:48)  Osmolality, Serum: Routine (10-06 @ 10:48)  Sodium, Random Urine: Routine (10-06 @ 10:48)

## 2017-10-20 NOTE — PROGRESS NOTE ADULT - ASSESSMENT
Hyperglycemia: FS in acceptable range, no history of diabetes will DC FS testing and Humalog coverage, will FU  Hyponatremia: Patient is euthyroid and not adrenally insufficient, has SIADH, on fluid restriction, hyponatremia improved,  FU   CHF: continue treatment, cardiology team FU

## 2017-10-20 NOTE — PROGRESS NOTE ADULT - PROBLEM SELECTOR PLAN 2
stable: can start spiriva on DC , pt has not been wheezing  doing ok  10/11: pt has not been wheezing and has been stable breathing wise: BD prn  10/13: the last ct scan chest on 23 of sept showed paraseptal emphysema: and left lower lobe rounded atlectasis: he would need repeat ct scan chest in december to ensure proper retisolution of the atelectasis:  10/16: remains stable: no wheezing: oxygenation is good  10/18: stable  10/20: pt has not been wheezing: can use duoneb: prn for wheezing or sob: has some chronic atelectasis in the left lower lobe

## 2017-10-20 NOTE — PROGRESS NOTE ADULT - SUBJECTIVE AND OBJECTIVE BOX
VITAL SIGNS    Telemetry:  rsr    Vital Signs Last 24 Hrs  T(C): 36.8 (10-20-17 @ 09:53), Max: 37.2 (10-19-17 @ 20:13)  T(F): 98.3 (10-20-17 @ 09:53), Max: 98.9 (10-19-17 @ 20:13)  HR: 96 (10-20-17 @ 09:53) (96 - 106)  BP: --  RR: 20 (10-20-17 @ 09:53) (20 - 20)  SpO2: 95% (10-20-17 @ 09:53) (95% - 98%)            10-19 @ 07:01  -  10-20 @ 07:00  --------------------------------------------------------  IN: 770 mL / OUT: 600 mL / NET: 170 mL       Daily     Daily Weight in k.2 (20 Oct 2017 11:00)  Admit Wt: Drug Dosing Weight  Height (cm): 177.8 (31 Aug 2017 18:00)  Weight (kg): 55 (12 Oct 2017 10:45)  BMI (kg/m2): 17.4 (12 Oct 2017 10:45)  BSA (m2): 1.69 (12 Oct 2017 10:45)      CAPILLARY BLOOD GLUCOSE      POCT Blood Glucose.: 89 mg/dL (20 Oct 2017 07:46)  POCT Blood Glucose.: 98 mg/dL (19 Oct 2017 21:42)  POCT Blood Glucose.: 112 mg/dL (19 Oct 2017 15:59)          aspirin enteric coated 81 milliGRAM(s) Oral daily  dextrose 5%. 1000 milliLiter(s) IV Continuous <Continuous>  dextrose 50% Injectable 12.5 Gram(s) IV Push once  dextrose 50% Injectable 25 Gram(s) IV Push once  dextrose 50% Injectable 25 Gram(s) IV Push once  dextrose Gel 1 Dose(s) Oral once PRN  docusate sodium 100 milliGRAM(s) Oral three times a day  glucagon  Injectable 1 milliGRAM(s) IntraMuscular once PRN  glycerin Suppository - Adult 1 Suppository(s) Rectal every 4 hours  lidocaine   Patch 1 Patch Transdermal daily  lidocaine   Patch 1 Patch Transdermal every 24 hours  lisinopril 5 milliGRAM(s) Oral at bedtime  metoprolol succinate ER 50 milliGRAM(s) Oral daily  mirtazapine 7.5 milliGRAM(s) Oral at bedtime  naloxone Injectable 0.1 milliGRAM(s) IV Push every 3 minutes PRN  oxyCODONE    IR 5 milliGRAM(s) Oral every 6 hours PRN  pantoprazole    Tablet 40 milliGRAM(s) Oral before breakfast  phenylephrine 0.25% Suppository 1 Suppository(s) Rectal every 8 hours PRN  polyethylene glycol 3350 17 Gram(s) Oral at bedtime  senna 3 Tablet(s) Oral at bedtime  spironolactone 12.5 milliGRAM(s) Oral daily  warfarin 4 milliGRAM(s) Oral once      PHYSICAL EXAM    Subjective: "I feel ok."   Neurology: alert and oriented x 3, nonfocal, no gross deficits  CV : tele:  RSR   Sternal Wound :  CDI TAI - well healed  drive line cdi with dressing   Lungs: clear. RR easy, unlabored   Abdomen: soft, nontender, nondistended, positive bowel sounds, + bowel movement   Neg N/V/D   :  pt voiding without difficulty   Extremities:   BLANDON; neg LE edema, neg calf tenderness.   PPP bilaterally

## 2017-10-21 LAB
ALBUMIN SERPL ELPH-MCNC: 3.3 G/DL — SIGNIFICANT CHANGE UP (ref 3.3–5)
ALP SERPL-CCNC: 90 U/L — SIGNIFICANT CHANGE UP (ref 40–120)
ALT FLD-CCNC: 20 U/L RC — SIGNIFICANT CHANGE UP (ref 10–45)
ANION GAP SERPL CALC-SCNC: 11 MMOL/L — SIGNIFICANT CHANGE UP (ref 5–17)
APTT BLD: 30.7 SEC — SIGNIFICANT CHANGE UP (ref 27.5–37.4)
AST SERPL-CCNC: 26 U/L — SIGNIFICANT CHANGE UP (ref 10–40)
BILIRUB DIRECT SERPL-MCNC: 0.1 MG/DL — SIGNIFICANT CHANGE UP (ref 0–0.2)
BILIRUB INDIRECT FLD-MCNC: 0.2 MG/DL — SIGNIFICANT CHANGE UP (ref 0.2–1)
BILIRUB SERPL-MCNC: 0.3 MG/DL — SIGNIFICANT CHANGE UP (ref 0.2–1.2)
BUN SERPL-MCNC: 17 MG/DL — SIGNIFICANT CHANGE UP (ref 7–23)
CALCIUM SERPL-MCNC: 9.6 MG/DL — SIGNIFICANT CHANGE UP (ref 8.4–10.5)
CHLORIDE SERPL-SCNC: 96 MMOL/L — SIGNIFICANT CHANGE UP (ref 96–108)
CO2 SERPL-SCNC: 28 MMOL/L — SIGNIFICANT CHANGE UP (ref 22–31)
CREAT SERPL-MCNC: 0.71 MG/DL — SIGNIFICANT CHANGE UP (ref 0.5–1.3)
GLUCOSE SERPL-MCNC: 97 MG/DL — SIGNIFICANT CHANGE UP (ref 70–99)
HCT VFR BLD CALC: 31.6 % — LOW (ref 39–50)
HGB BLD-MCNC: 10.4 G/DL — LOW (ref 13–17)
INR BLD: 1.58 RATIO — HIGH (ref 0.88–1.16)
LDH SERPL L TO P-CCNC: 326 U/L — HIGH (ref 50–242)
MCHC RBC-ENTMCNC: 31.3 PG — SIGNIFICANT CHANGE UP (ref 27–34)
MCHC RBC-ENTMCNC: 32.9 GM/DL — SIGNIFICANT CHANGE UP (ref 32–36)
MCV RBC AUTO: 95.3 FL — SIGNIFICANT CHANGE UP (ref 80–100)
PLATELET # BLD AUTO: 282 K/UL — SIGNIFICANT CHANGE UP (ref 150–400)
POTASSIUM SERPL-MCNC: 4.6 MMOL/L — SIGNIFICANT CHANGE UP (ref 3.5–5.3)
POTASSIUM SERPL-SCNC: 4.6 MMOL/L — SIGNIFICANT CHANGE UP (ref 3.5–5.3)
PROT SERPL-MCNC: 8.8 G/DL — HIGH (ref 6–8.3)
PROTHROM AB SERPL-ACNC: 17.2 SEC — HIGH (ref 9.8–12.7)
RBC # BLD: 3.32 M/UL — LOW (ref 4.2–5.8)
RBC # FLD: 13.3 % — SIGNIFICANT CHANGE UP (ref 10.3–14.5)
SODIUM SERPL-SCNC: 135 MMOL/L — SIGNIFICANT CHANGE UP (ref 135–145)
WBC # BLD: 10.7 K/UL — HIGH (ref 3.8–10.5)
WBC # FLD AUTO: 10.7 K/UL — HIGH (ref 3.8–10.5)

## 2017-10-21 RX ORDER — WARFARIN SODIUM 2.5 MG/1
5 TABLET ORAL ONCE
Qty: 0 | Refills: 0 | Status: COMPLETED | OUTPATIENT
Start: 2017-10-21 | End: 2017-10-21

## 2017-10-21 RX ADMIN — SENNA PLUS 3 TABLET(S): 8.6 TABLET ORAL at 22:40

## 2017-10-21 RX ADMIN — LISINOPRIL 7.5 MILLIGRAM(S): 2.5 TABLET ORAL at 22:43

## 2017-10-21 RX ADMIN — Medication 50 MILLIGRAM(S): at 05:55

## 2017-10-21 RX ADMIN — Medication 1 SUPPOSITORY(S): at 13:38

## 2017-10-21 RX ADMIN — Medication 100 MILLIGRAM(S): at 13:38

## 2017-10-21 RX ADMIN — WARFARIN SODIUM 5 MILLIGRAM(S): 2.5 TABLET ORAL at 22:42

## 2017-10-21 RX ADMIN — POLYETHYLENE GLYCOL 3350 17 GRAM(S): 17 POWDER, FOR SOLUTION ORAL at 22:40

## 2017-10-21 RX ADMIN — MIRTAZAPINE 7.5 MILLIGRAM(S): 45 TABLET, ORALLY DISINTEGRATING ORAL at 22:41

## 2017-10-21 RX ADMIN — Medication 100 MILLIGRAM(S): at 05:55

## 2017-10-21 RX ADMIN — SPIRONOLACTONE 12.5 MILLIGRAM(S): 25 TABLET, FILM COATED ORAL at 05:55

## 2017-10-21 RX ADMIN — PANTOPRAZOLE SODIUM 40 MILLIGRAM(S): 20 TABLET, DELAYED RELEASE ORAL at 05:56

## 2017-10-21 RX ADMIN — Medication 100 MILLIGRAM(S): at 22:42

## 2017-10-21 RX ADMIN — Medication 81 MILLIGRAM(S): at 12:38

## 2017-10-21 NOTE — PROGRESS NOTE ADULT - ASSESSMENT
Hyperglycemia: FS in acceptable range,  will FU  Hyponatremia: Patient is euthyroid and not adrenally insufficient, has SIADH, on fluid restriction, hyponatremia improved,  FU   CHF: continue treatment, cardiology team FU

## 2017-10-21 NOTE — PROGRESS NOTE ADULT - ASSESSMENT
61 y/o Creole speaking male  presented with abdominal pain and  found to be in low output heart failure with cardiogenic shock (EF 10%) s/p 9/12 LVAD/TV annuloplasty for INTERMACS 2   Postop Course:   Acute blood loss anemia 2/2 blood loss postop -->requiring PRBC, amicar, PLT, cryo.  TTE 9/13 LVEDD 5.9 cm, mild-mod MR, aortic valve closed, mild RV dysfunction, mild TR.   c/o L sided pain; found to have L hemithorax s/p pigtail that was removed; fluid c/w exudative with lot of RBC,   (+) Hyponatremia - renal consulted- urine studies sent / Endocrine following for SIADH work up  10/6 Random Urine: 49; Patient euthyroid and not adrenally insufficient -->Patient dx with SIADH, on fluid restriction.  (+) depressive mood --> psych following; Remeron initiated; started at 7.5 mg HS; avoid up titrating 2/2 risk of worsening hyponatremia    LVAD interrogated w/o events.   10/9 Small amount of rectal bleeding after bowel movement; (+) internal hemorrhoids --> Continue bowel regimen / Start Preparation H suppository TID PRN.   No further bleeding noted. pt ambulating with a walker.  Psych follow up pending  Lisinopril d/c yesterday for hypotension, stable  Pain meds increased by palliative  10/12  coumadin dosing, na improving 134, ENDO for siadh, old ivl out refusing new ivl  10/13   coumadin dosing,   lisinopril added per HF  10/14   VSS   ambulating  10/15  VSS   INR down to 1.78   repat INR this afternoon   ?Hep GTT  10/16 INR > 2- no heparin gttp indicated.  continue meds as per CHF team; vss; pt stable; MAP 76   10/17 INR>2 D/W patient family returning from [facility] on this week  pending discharge to home ambulating coumadin 5 tonight   discharge planning to home vs rehab pending placement   10/20 INR 1.6 - coumadin 45mg tonight;  and MAP 70;s    10/21 INR 1.5 coumadin 5 mg tonight; ;  pt stable-   discharge planning- home sun/ mon   discharge planning- home

## 2017-10-21 NOTE — PROGRESS NOTE ADULT - PROBLEM SELECTOR PLAN 2
stable: can start spiriva on DC , pt has not been wheezing  doing ok  10/11: pt has not been wheezing and has been stable breathing wise: BD prn  10/13: the last ct scan chest on 23 of sept showed paraseptal emphysema: and left lower lobe rounded atlectasis: he would need repeat ct scan chest in december to ensure proper retisolution of the atelectasis:  10/16: remains stable: no wheezing: oxygenation is good  10/18: stable  10/20: pt has not been wheezing: can use duoneb: prn for wheezing or sob: has some chronic atelectasis in the left lower lobe  10/21: paraseptal emphysema: ols smoker; not wheezing stable: check for his home oxygen requirement prior to dc

## 2017-10-21 NOTE — PROGRESS NOTE ADULT - SUBJECTIVE AND OBJECTIVE BOX
Patient is a 62y old  Male who presents with a chief complaint of SOOB (25 Aug 2017 22:27)    pt is alert and awake with no sob  no chest pain and no abdominal pain at this time.   Any change in ROS:     MEDICATIONS  (STANDING):  aspirin enteric coated 81 milliGRAM(s) Oral daily  dextrose 5%. 1000 milliLiter(s) (50 mL/Hr) IV Continuous <Continuous>  dextrose 50% Injectable 12.5 Gram(s) IV Push once  dextrose 50% Injectable 25 Gram(s) IV Push once  dextrose 50% Injectable 25 Gram(s) IV Push once  docusate sodium 100 milliGRAM(s) Oral three times a day  glycerin Suppository - Adult 1 Suppository(s) Rectal every 4 hours  lidocaine   Patch 1 Patch Transdermal daily  lidocaine   Patch 1 Patch Transdermal every 24 hours  lisinopril 7.5 milliGRAM(s) Oral at bedtime  metoprolol succinate ER 50 milliGRAM(s) Oral daily  mirtazapine 7.5 milliGRAM(s) Oral at bedtime  pantoprazole    Tablet 40 milliGRAM(s) Oral before breakfast  polyethylene glycol 3350 17 Gram(s) Oral at bedtime  senna 3 Tablet(s) Oral at bedtime  spironolactone 12.5 milliGRAM(s) Oral daily    MEDICATIONS  (PRN):  dextrose Gel 1 Dose(s) Oral once PRN Blood Glucose LESS THAN 70 milliGRAM(s)/deciLiter  glucagon  Injectable 1 milliGRAM(s) IntraMuscular once PRN Glucose <70 milliGRAM(s)/deciLiter  naloxone Injectable 0.1 milliGRAM(s) IV Push every 3 minutes PRN Sedation and respiratory depression RR < 11  oxyCODONE    IR 5 milliGRAM(s) Oral every 6 hours PRN Severe Pain (7 - 10)  phenylephrine 0.25% Suppository 1 Suppository(s) Rectal every 8 hours PRN Hemorrhoids    Vital Signs Last 24 Hrs  T(C): 36.4 (21 Oct 2017 10:00), Max: 37.1 (20 Oct 2017 14:00)  T(F): 97.5 (21 Oct 2017 10:00), Max: 98.8 (20 Oct 2017 14:00)  HR: 90 (21 Oct 2017 10:00) (79 - 99)  BP: --  BP(mean): 76 (21 Oct 2017 10:00) (74 - 76)  RR: 18 (21 Oct 2017 10:00) (18 - 20)  SpO2: 96% (21 Oct 2017 10:00) (96% - 98%)    I&O's Summary    20 Oct 2017 07:01  -  21 Oct 2017 07:00  --------------------------------------------------------  IN: 1160 mL / OUT: 800 mL / NET: 360 mL    21 Oct 2017 07:01  -  21 Oct 2017 13:00  --------------------------------------------------------  IN: 120 mL / OUT: 100 mL / NET: 20 mL          Physical Exam:   GENERAL:cachectic  HEENT: ALONSO/   Atraumatic, Normocephalic  ENMT: No tonsillar erythema, exudates, or enlargement; Moist mucous membranes, Good dentition, No lesions  NECK: Supple, No JVD, Normal thyroid  CHEST/LUNG: Clear to auscultaion  CVS: Regular rate and rhythm; No murmurs, rubs, or gallops  GI: : Soft, Nontender, Nondistended; Bowel sounds present  NERVOUS SYSTEM:  Alert & Oriented X3  EXTREMITIES:  2+ Peripheral Pulses, No clubbing, cyanosis, or edema  LYMPH: No lymphadenopathy noted  SKIN: No rashes or lesions  ENDOCRINOLOGY: No Thyromegaly  PSYCH: Appropriate    Labs:                              10.4   10.7  )-----------( 282      ( 21 Oct 2017 06:23 )             31.6                         9.5    10.9  )-----------( 297      ( 20 Oct 2017 06:00 )             29.0                         9.9    10.0  )-----------( 252      ( 19 Oct 2017 05:58 )             30.7                         9.6    9.3   )-----------( 257      ( 18 Oct 2017 01:35 )             28.1     10-21    135  |  96  |  17  ----------------------------<  97  4.6   |  28  |  0.71  10-20    136  |  96  |  16  ----------------------------<  89  4.4   |  30  |  0.71  10-19    134<L>  |  95<L>  |  14  ----------------------------<  103<H>  4.6   |  27  |  0.63  10-18    134<L>  |  94<L>  |  21  ----------------------------<  149<H>  4.4   |  29  |  0.77    Ca    9.6      21 Oct 2017 06:27  Ca    9.8      20 Oct 2017 06:00    TPro  8.8<H>  /  Alb  3.3  /  TBili  0.3  /  DBili  0.1  /  AST  26  /  ALT  20  /  AlkPhos  90  10-21    CAPILLARY BLOOD GLUCOSE          LIVER FUNCTIONS - ( 21 Oct 2017 06:27 )  Alb: 3.3 g/dL / Pro: 8.8 g/dL / ALK PHOS: 90 U/L / ALT: 20 U/L RC / AST: 26 U/L / GGT: x           PT/INR - ( 21 Oct 2017 06:23 )   PT: 17.2 sec;   INR: 1.58 ratio         PTT - ( 21 Oct 2017 06:23 )  PTT:30.7 sec    Cultures:       < from: CT Angio Chest w/ IV Cont (09.24.17 @ 17:10) >    LUNGS AND LARGE AIRWAYS: Patent central airways. Paraseptal emphysema.   Ground glass opacities in the left lower lobe.  PLEURA: Small loculated left pleural effusion, unchanged.  VESSELS: No aortic dissection. No aneurysm. No central pulmonary embolism.  HEART: Statuspost LVAD and mitral valve annuloplasty. Heart size is   enlarged. No pericardial effusion. Small amount calcified plaque   involving the left anterior descending and left circumflex coronary   arteries.  MEDIASTINUM AND DON: No lymphadenopathy.  CHEST WALL AND LOWER NECK: Within normal limits.    ABDOMEN AND PELVIS:    LIVER: Within normal limits.  BILE DUCTS: Normal caliber.  GALLBLADDER: Within normal limits.  SPLEEN: Within normal limits.  PANCREAS: Within normal limits.  ADRENALS: Within normal limits.  KIDNEYS/URETERS: Within normal limits.    BLADDER: Slightly distended with small amount of nondependent air.  REPRODUCTIVE ORGANS: The prostate is within normal limits.    BOWEL: No bowel obstruction.   PERITONEUM: No ascites.  VESSELS:Atherosclerotic calcifications. Normal patency of the abdominal   aorta, no aneurysm.  RETROPERITONEUM: No lymphadenopathy.    ABDOMINAL WALL: Within normal limits.  BONES: Status post sternotomy. Degenerative spinal changes most prominent   at the level of L4 and L5 with disc height loss and mild retrolisthesis   of L4 on L5.    IMPRESSION:     Unchanged small left loculated pleural effusion with associated   compressive atelectasis of the left lower lobe.    Small amount of air within the bladder. Correlate for recent   instrumentation and with urinalysis to exclude infection.    < end of copied text >      < from: Xray Chest 1 View AP- PORTABLE-Urgent (09.29.17 @ 16:37) >  EXAM:  CHEST-PORTABLE URGENT                            PROCEDURE DATE:  09/29/2017            INTERPRETATION:  AP chest with comparison to 1347 hours from the same day.    IMPRESSION: Left pigtail catheter removed.    IMPRESSION: Left-sided pigtail catheter has been removed. There is no   pneumothorax. The heart is normal in size. The patient is status post   median sternotomy. The lungs are clear.                    TIA BENAVIDES M.D., ATTENDING RADIOLOGIST  This document has been electronically signed. Sep 30 2017  4:18PM        < end of copied text >                    Studies  Chest X-RAY  CT SCAN Chest   Venous Dopplers: LE:   CT Abdomen  Others

## 2017-10-21 NOTE — PROGRESS NOTE ADULT - SUBJECTIVE AND OBJECTIVE BOX
Chief complaint  Patient is a 62y old  Male who presents with a chief complaint of SOOB (25 Aug 2017 22:27)   Review of systems  Patient in bed, comfortable, no fever,  no hypoglycemia.    Labs and Fingersticks    CAPILLARY BLOOD GLUCOSE  117 (19 Oct 2017 11:31)    Anion Gap, Serum: 11 (10-21 @ 06:27)  Anion Gap, Serum: 10 (10-20 @ 06:00)      Calcium, Total Serum: 9.6 (10-21 @ 06:27)  Calcium, Total Serum: 9.8 (10-20 @ 06:00)  Albumin, Serum: 3.3 (10-21 @ 06:27)    Alanine Aminotransferase (ALT/SGPT): 20 (10-21 @ 06:27)  Alkaline Phosphatase, Serum: 90 (10-21 @ 06:27)  Aspartate Aminotransferase (AST/SGOT): 26 (10-21 @ 06:27)        10-21    135  |  96  |  17  ----------------------------<  97  4.6   |  28  |  0.71    Ca    9.6      21 Oct 2017 06:27    TPro  8.8<H>  /  Alb  3.3  /  TBili  0.3  /  DBili  0.1  /  AST  26  /  ALT  20  /  AlkPhos  90  10-21                        10.4   10.7  )-----------( 282      ( 21 Oct 2017 06:23 )             31.6     Medications  MEDICATIONS  (STANDING):  aspirin enteric coated 81 milliGRAM(s) Oral daily  dextrose 5%. 1000 milliLiter(s) (50 mL/Hr) IV Continuous <Continuous>  dextrose 50% Injectable 12.5 Gram(s) IV Push once  dextrose 50% Injectable 25 Gram(s) IV Push once  dextrose 50% Injectable 25 Gram(s) IV Push once  docusate sodium 100 milliGRAM(s) Oral three times a day  glycerin Suppository - Adult 1 Suppository(s) Rectal every 4 hours  lidocaine   Patch 1 Patch Transdermal daily  lidocaine   Patch 1 Patch Transdermal every 24 hours  lisinopril 7.5 milliGRAM(s) Oral at bedtime  metoprolol succinate ER 50 milliGRAM(s) Oral daily  mirtazapine 7.5 milliGRAM(s) Oral at bedtime  pantoprazole    Tablet 40 milliGRAM(s) Oral before breakfast  polyethylene glycol 3350 17 Gram(s) Oral at bedtime  senna 3 Tablet(s) Oral at bedtime  spironolactone 12.5 milliGRAM(s) Oral daily  warfarin 5 milliGRAM(s) Oral once      Physical Exam  General: Patient comfortable in bed  Vital Signs Last 12 Hrs  T(F): 98.3 (10-21-17 @ 13:45), Max: 98.3 (10-21-17 @ 13:45)  HR: 92 (10-21-17 @ 13:45) (82 - 92)  BP: --  BP(mean): 74 (10-21-17 @ 13:45) (74 - 76)  RR: 18 (10-21-17 @ 13:45) (18 - 18)  SpO2: 95% (10-21-17 @ 13:45) (95% - 98%)  Neck: No palpable thyroid nodules.  CVS: S1S2, No murmurs  Respiratory: No wheezing, no crepitations  GI: Abdomen soft, bowel sounds positive  Musculoskeletal: Positive edema lower extremities bilaterally  Skin: No skin rashes, no ecchymosis    Diagnostics    Osmolality, Random Urine: Routine  Cancel Reason: Lab Operations Cancel (10-06 @ 10:48)  Osmolality, Serum: Routine (10-06 @ 10:48)  Sodium, Random Urine: Routine (10-06 @ 10:48)

## 2017-10-21 NOTE — PROGRESS NOTE ADULT - PROBLEM SELECTOR PLAN 3
S/P LVAD : doing better: care per CTS:  Heart failure team following  10/16: stable  10/18L: stable : card following  10/20: stable : cont current treatment: s/p LVAD: stable  10/21: doing ok

## 2017-10-21 NOTE — PROGRESS NOTE ADULT - SUBJECTIVE AND OBJECTIVE BOX
VITAL SIGNS    Telemetry:  rsr    Vital Signs Last 24 Hrs  T(C): 36.8 (10-21-17 @ 13:45), Max: 36.8 (10-20-17 @ 20:06)  T(F): 98.3 (10-21-17 @ 13:45), Max: 98.3 (10-21-17 @ 02:00)  HR: 92 (10-21-17 @ 13:45) (79 - 99)  BP: --  RR: 18 (10-21-17 @ 13:45) (18 - 20)  SpO2: 95% (10-21-17 @ 13:45) (95% - 98%)            10-20 @ 07:01  -  10-21 @ 07:00  --------------------------------------------------------  IN: 1160 mL / OUT: 800 mL / NET: 360 mL    10-21 @ 07:01  -  10-21 @ 14:20  --------------------------------------------------------  IN: 120 mL / OUT: 100 mL / NET: 20 mL       Daily     Daily Weight in k.2 (21 Oct 2017 08:00)  Admit Wt: Drug Dosing Weight  Height (cm): 177.8 (31 Aug 2017 18:00)  Weight (kg): 55 (12 Oct 2017 10:45)  BMI (kg/m2): 17.4 (12 Oct 2017 10:45)  BSA (m2): 1.69 (12 Oct 2017 10:45)    Bilirubin Direct, Serum: 0.1 mg/dL (10-21 @ 06:27)  Bilirubin Total, Serum: 0.3 mg/dL (10-21 @ 06:27)    CAPILLARY BLOOD GLUCOSE              aspirin enteric coated 81 milliGRAM(s) Oral daily  dextrose 5%. 1000 milliLiter(s) IV Continuous <Continuous>  dextrose 50% Injectable 12.5 Gram(s) IV Push once  dextrose 50% Injectable 25 Gram(s) IV Push once  dextrose 50% Injectable 25 Gram(s) IV Push once  dextrose Gel 1 Dose(s) Oral once PRN  docusate sodium 100 milliGRAM(s) Oral three times a day  glucagon  Injectable 1 milliGRAM(s) IntraMuscular once PRN  glycerin Suppository - Adult 1 Suppository(s) Rectal every 4 hours  lidocaine   Patch 1 Patch Transdermal daily  lidocaine   Patch 1 Patch Transdermal every 24 hours  lisinopril 7.5 milliGRAM(s) Oral at bedtime  metoprolol succinate ER 50 milliGRAM(s) Oral daily  mirtazapine 7.5 milliGRAM(s) Oral at bedtime  naloxone Injectable 0.1 milliGRAM(s) IV Push every 3 minutes PRN  oxyCODONE    IR 5 milliGRAM(s) Oral every 6 hours PRN  pantoprazole    Tablet 40 milliGRAM(s) Oral before breakfast  phenylephrine 0.25% Suppository 1 Suppository(s) Rectal every 8 hours PRN  polyethylene glycol 3350 17 Gram(s) Oral at bedtime  senna 3 Tablet(s) Oral at bedtime  spironolactone 12.5 milliGRAM(s) Oral daily  warfarin 5 milliGRAM(s) Oral once      PHYSICAL EXAM    Subjective: "I'm ok."   Neurology: alert and oriented x 3, nonfocal, no gross deficits  CV : tele:  RSR    Sternal Wound :  CDI TAI   drive line cdi with dressing   Lungs: clear. RR easy, unlabored   Abdomen: soft, nontender, nondistended, positive bowel sounds, + bowel movement   Neg N/V/D   :  pt voiding without difficulty   Extremities:   BLANDON; neg LE edema, neg calf tenderness.   PPP bilaterally

## 2017-10-22 LAB
ALBUMIN SERPL ELPH-MCNC: 3.3 G/DL — SIGNIFICANT CHANGE UP (ref 3.3–5)
ALP SERPL-CCNC: 94 U/L — SIGNIFICANT CHANGE UP (ref 40–120)
ALT FLD-CCNC: 21 U/L RC — SIGNIFICANT CHANGE UP (ref 10–45)
ANION GAP SERPL CALC-SCNC: 10 MMOL/L — SIGNIFICANT CHANGE UP (ref 5–17)
AST SERPL-CCNC: 27 U/L — SIGNIFICANT CHANGE UP (ref 10–40)
BILIRUB DIRECT SERPL-MCNC: 0.1 MG/DL — SIGNIFICANT CHANGE UP (ref 0–0.2)
BILIRUB INDIRECT FLD-MCNC: 0.2 MG/DL — SIGNIFICANT CHANGE UP (ref 0.2–1)
BILIRUB SERPL-MCNC: 0.3 MG/DL — SIGNIFICANT CHANGE UP (ref 0.2–1.2)
BUN SERPL-MCNC: 19 MG/DL — SIGNIFICANT CHANGE UP (ref 7–23)
CALCIUM SERPL-MCNC: 9.6 MG/DL — SIGNIFICANT CHANGE UP (ref 8.4–10.5)
CHLORIDE SERPL-SCNC: 95 MMOL/L — LOW (ref 96–108)
CO2 SERPL-SCNC: 29 MMOL/L — SIGNIFICANT CHANGE UP (ref 22–31)
CREAT SERPL-MCNC: 0.8 MG/DL — SIGNIFICANT CHANGE UP (ref 0.5–1.3)
GLUCOSE SERPL-MCNC: 94 MG/DL — SIGNIFICANT CHANGE UP (ref 70–99)
HCT VFR BLD CALC: 32 % — LOW (ref 39–50)
HGB BLD-MCNC: 10.8 G/DL — LOW (ref 13–17)
INR BLD: 1.73 RATIO — HIGH (ref 0.88–1.16)
LDH SERPL L TO P-CCNC: 320 U/L — HIGH (ref 50–242)
MCHC RBC-ENTMCNC: 31.8 PG — SIGNIFICANT CHANGE UP (ref 27–34)
MCHC RBC-ENTMCNC: 33.6 GM/DL — SIGNIFICANT CHANGE UP (ref 32–36)
MCV RBC AUTO: 94.5 FL — SIGNIFICANT CHANGE UP (ref 80–100)
PLATELET # BLD AUTO: 271 K/UL — SIGNIFICANT CHANGE UP (ref 150–400)
POTASSIUM SERPL-MCNC: 4.6 MMOL/L — SIGNIFICANT CHANGE UP (ref 3.5–5.3)
POTASSIUM SERPL-SCNC: 4.6 MMOL/L — SIGNIFICANT CHANGE UP (ref 3.5–5.3)
PROT SERPL-MCNC: 8.8 G/DL — HIGH (ref 6–8.3)
PROTHROM AB SERPL-ACNC: 18.9 SEC — HIGH (ref 9.8–12.7)
RBC # BLD: 3.38 M/UL — LOW (ref 4.2–5.8)
RBC # FLD: 13.1 % — SIGNIFICANT CHANGE UP (ref 10.3–14.5)
SODIUM SERPL-SCNC: 134 MMOL/L — LOW (ref 135–145)
WBC # BLD: 9.9 K/UL — SIGNIFICANT CHANGE UP (ref 3.8–10.5)
WBC # FLD AUTO: 9.9 K/UL — SIGNIFICANT CHANGE UP (ref 3.8–10.5)

## 2017-10-22 RX ORDER — WARFARIN SODIUM 2.5 MG/1
5 TABLET ORAL ONCE
Qty: 0 | Refills: 0 | Status: COMPLETED | OUTPATIENT
Start: 2017-10-22 | End: 2017-10-22

## 2017-10-22 RX ADMIN — Medication 100 MILLIGRAM(S): at 22:13

## 2017-10-22 RX ADMIN — MIRTAZAPINE 7.5 MILLIGRAM(S): 45 TABLET, ORALLY DISINTEGRATING ORAL at 22:13

## 2017-10-22 RX ADMIN — PANTOPRAZOLE SODIUM 40 MILLIGRAM(S): 20 TABLET, DELAYED RELEASE ORAL at 05:48

## 2017-10-22 RX ADMIN — Medication 100 MILLIGRAM(S): at 05:48

## 2017-10-22 RX ADMIN — LISINOPRIL 7.5 MILLIGRAM(S): 2.5 TABLET ORAL at 22:13

## 2017-10-22 RX ADMIN — Medication 81 MILLIGRAM(S): at 12:28

## 2017-10-22 RX ADMIN — WARFARIN SODIUM 5 MILLIGRAM(S): 2.5 TABLET ORAL at 22:13

## 2017-10-22 RX ADMIN — Medication 50 MILLIGRAM(S): at 05:48

## 2017-10-22 RX ADMIN — Medication 100 MILLIGRAM(S): at 12:29

## 2017-10-22 RX ADMIN — SPIRONOLACTONE 12.5 MILLIGRAM(S): 25 TABLET, FILM COATED ORAL at 05:48

## 2017-10-22 NOTE — PROGRESS NOTE ADULT - ASSESSMENT
61 y/o Creole speaking male  presented with abdominal pain and  found to be in low output heart failure with cardiogenic shock (EF 10%) s/p 9/12 LVAD/TV annuloplasty for INTERMACS 2   Postop Course:   Acute blood loss anemia 2/2 blood loss postop -->requiring PRBC, amicar, PLT, cryo.  TTE 9/13 LVEDD 5.9 cm, mild-mod MR, aortic valve closed, mild RV dysfunction, mild TR.   c/o L sided pain; found to have L hemithorax s/p pigtail that was removed; fluid c/w exudative with lot of RBC,   (+) Hyponatremia - renal consulted- urine studies sent / Endocrine following for SIADH work up  10/6 Random Urine: 49; Patient euthyroid and not adrenally insufficient -->Patient dx with SIADH, on fluid restriction.  (+) depressive mood --> psych following; Remeron initiated; started at 7.5 mg HS; avoid up titrating 2/2 risk of worsening hyponatremia    LVAD interrogated w/o events.   10/9 Small amount of rectal bleeding after bowel movement; (+) internal hemorrhoids --> Continue bowel regimen / Start Preparation H suppository TID PRN.   No further bleeding noted. pt ambulating with a walker.  Psych follow up pending  Lisinopril d/c yesterday for hypotension, stable  Pain meds increased by palliative  10/12  coumadin dosing, na improving 134, ENDO for siadh, old ivl out refusing new ivl  10/13   coumadin dosing,   lisinopril added per HF  10/14   VSS   ambulating  10/15  VSS   INR down to 1.78   repat INR this afternoon   ?Hep GTT  10/16 INR > 2- no heparin gttp indicated.  continue meds as per CHF team; vss; pt stable; MAP 76   10/17 INR>2 D/W patient family returning from [facility] on this week  pending discharge to home ambulating coumadin 5 tonight   discharge planning to home vs rehab pending placement   10/20 INR 1.6 - coumadin 45mg tonight;  and MAP 70;s    10/21 INR 1.5 coumadin 5 mg tonight; ;  pt stable-   discharge planning- home sun/ mon   discharge planning- home   10/22 no active issue awaiting therapeutic inr today 1.73  coumadin 5mg ordered ldh 320

## 2017-10-22 NOTE — PROGRESS NOTE ADULT - PROBLEM SELECTOR PLAN 2
Continue with Toprol XL 25 qd/ Lisinopril  Continue with AC therapy on coumadin for a therapeutic INR goal 2-3  possible discharge home  Monday 10/23

## 2017-10-22 NOTE — PROGRESS NOTE ADULT - PROBLEM SELECTOR PLAN 1
resolved  10/18: better: no resp symtpoms  1/20: stable: n o sob , no wheezing:  10/21: resolved  10/22: stable : resolved : no resp distress

## 2017-10-22 NOTE — PROGRESS NOTE ADULT - SUBJECTIVE AND OBJECTIVE BOX
Patient is a 62y old  Male who presents with a chief complaint of SOOB (25 Aug 2017 22:27)    doing great  sisters at bedside   Any change in ROS:     MEDICATIONS  (STANDING):  aspirin enteric coated 81 milliGRAM(s) Oral daily  dextrose 5%. 1000 milliLiter(s) (50 mL/Hr) IV Continuous <Continuous>  dextrose 50% Injectable 12.5 Gram(s) IV Push once  dextrose 50% Injectable 25 Gram(s) IV Push once  dextrose 50% Injectable 25 Gram(s) IV Push once  docusate sodium 100 milliGRAM(s) Oral three times a day  lidocaine   Patch 1 Patch Transdermal daily  lidocaine   Patch 1 Patch Transdermal every 24 hours  lisinopril 7.5 milliGRAM(s) Oral at bedtime  metoprolol succinate ER 50 milliGRAM(s) Oral daily  mirtazapine 7.5 milliGRAM(s) Oral at bedtime  pantoprazole    Tablet 40 milliGRAM(s) Oral before breakfast  polyethylene glycol 3350 17 Gram(s) Oral at bedtime  senna 3 Tablet(s) Oral at bedtime  spironolactone 12.5 milliGRAM(s) Oral daily  warfarin 5 milliGRAM(s) Oral once    MEDICATIONS  (PRN):  dextrose Gel 1 Dose(s) Oral once PRN Blood Glucose LESS THAN 70 milliGRAM(s)/deciLiter  glucagon  Injectable 1 milliGRAM(s) IntraMuscular once PRN Glucose <70 milliGRAM(s)/deciLiter  naloxone Injectable 0.1 milliGRAM(s) IV Push every 3 minutes PRN Sedation and respiratory depression RR < 11  oxyCODONE    IR 5 milliGRAM(s) Oral every 6 hours PRN Severe Pain (7 - 10)  phenylephrine 0.25% Suppository 1 Suppository(s) Rectal every 8 hours PRN Hemorrhoids    Vital Signs Last 24 Hrs  T(C): 36.4 (22 Oct 2017 14:08), Max: 37 (21 Oct 2017 18:00)  T(F): 97.5 (22 Oct 2017 14:08), Max: 98.6 (21 Oct 2017 18:00)  HR: 93 (22 Oct 2017 14:08) (87 - 94)  BP: --  BP(mean): 76 (22 Oct 2017 14:08) (64 - 89)  RR: 18 (22 Oct 2017 14:08) (18 - 18)  SpO2: 100% (22 Oct 2017 14:08) (96% - 100%)    I&O's Summary    21 Oct 2017 07:01  -  22 Oct 2017 07:00  --------------------------------------------------------  IN: 600 mL / OUT: 900 mL / NET: -300 mL    22 Oct 2017 07:01  -  22 Oct 2017 15:48  --------------------------------------------------------  IN: 360 mL / OUT: 250 mL / NET: 110 mL          Physical Exam:   GENERAL: cachectic  HEENT: ALONSO/   Atraumatic, Normocephalic  ENMT: No tonsillar erythema, exudates, or enlargement; Moist mucous membranes, Good dentition, No lesions  NECK: Supple, No JVD, Normal thyroid  CHEST/LUNG: Clear to auscultaion, ; No rales, rhonchi, wheezing, or rubs  CVS: Regular rate and rhythm; No murmurs, rubs, or gallops  GI: : Soft, Nontender, Nondistended; Bowel sounds present  NERVOUS SYSTEM:  Alert & Oriented X3  EXTREMITIES:  2+ Peripheral Pulses, No clubbing, cyanosis, or edema  LYMPH: No lymphadenopathy noted  SKIN: No rashes or lesions  ENDOCRINOLOGY: No Thyromegaly  PSYCH: Appropriate    Labs:                              10.8   9.9   )-----------( 271      ( 22 Oct 2017 06:36 )             32.0                         10.4   10.7  )-----------( 282      ( 21 Oct 2017 06:23 )             31.6                         9.5    10.9  )-----------( 297      ( 20 Oct 2017 06:00 )             29.0                         9.9    10.0  )-----------( 252      ( 19 Oct 2017 05:58 )             30.7     10-22    134<L>  |  95<L>  |  19  ----------------------------<  94  4.6   |  29  |  0.80  10-21    135  |  96  |  17  ----------------------------<  97  4.6   |  28  |  0.71  10-20    136  |  96  |  16  ----------------------------<  89  4.4   |  30  |  0.71  10-19    134<L>  |  95<L>  |  14  ----------------------------<  103<H>  4.6   |  27  |  0.63    Ca    9.6      22 Oct 2017 06:42  Ca    9.6      21 Oct 2017 06:27    TPro  8.8<H>  /  Alb  3.3  /  TBili  0.3  /  DBili  0.1  /  AST  27  /  ALT  21  /  AlkPhos  94  10-22  TPro  8.8<H>  /  Alb  3.3  /  TBili  0.3  /  DBili  0.1  /  AST  26  /  ALT  20  /  AlkPhos  90  10-21    CAPILLARY BLOOD GLUCOSE          LIVER FUNCTIONS - ( 22 Oct 2017 06:42 )  Alb: 3.3 g/dL / Pro: 8.8 g/dL / ALK PHOS: 94 U/L / ALT: 21 U/L RC / AST: 27 U/L / GGT: x           PT/INR - ( 22 Oct 2017 06:36 )   PT: 18.9 sec;   INR: 1.73 ratio         PTT - ( 21 Oct 2017 06:23 )  PTT:30.7 sec    Cultures:           < from: Xray Chest 1 View AP- PORTABLE-Urgent (09.29.17 @ 16:37) >    EXAM:  CHEST-PORTABLE URGENT                            PROCEDURE DATE:  09/29/2017            INTERPRETATION:  AP chest with comparison to 1347 hours from the same day.    IMPRESSION: Left pigtail catheter removed.    IMPRESSION: Left-sided pigtail catheter has been removed. There is no   pneumothorax. The heart is normal in size. The patient is status post   median sternotomy. The lungs are clear.                    TIA BENAVIDES M.D., ATTENDING RADIOLOGIST  This document has been electronically signed. Sep 30 2017  4:18PM        < end of copied text >                    Studies  Chest X-RAY  CT SCAN Chest   Venous Dopplers: LE:   CT Abdomen  Others

## 2017-10-22 NOTE — PROGRESS NOTE ADULT - PROBLEM SELECTOR PLAN 3
S/P LVAD : doing better: care per CTS:  Heart failure team following  10/16: stable  10/18L: stable : card following  10/20: stable : cont current treatment: s/p LVAD: stable  10/21: doing ok  10/22: stable

## 2017-10-22 NOTE — PROGRESS NOTE ADULT - PROBLEM SELECTOR PROBLEM 7
Hyponatremia
Depression
Hyponatremia
Depression

## 2017-10-22 NOTE — PROGRESS NOTE ADULT - SUBJECTIVE AND OBJECTIVE BOX
Chief complaint  Patient is a 62y old  Male who presents with a chief complaint of SOOB (25 Aug 2017 22:27)   Review of systems  Patient in bed, comfortable, no fever,  no hypoglycemia.    Labs and Fingersticks    CAPILLARY BLOOD GLUCOSE    Anion Gap, Serum: 10 (10-22 @ 06:42)  Anion Gap, Serum: 11 (10-21 @ 06:27)      Calcium, Total Serum: 9.6 (10-22 @ 06:42)  Calcium, Total Serum: 9.6 (10-21 @ 06:27)  Albumin, Serum: 3.3 (10-22 @ 06:42)  Albumin, Serum: 3.3 (10-21 @ 06:27)    Alanine Aminotransferase (ALT/SGPT): 21 (10-22 @ 06:42)  Alanine Aminotransferase (ALT/SGPT): 20 (10-21 @ 06:27)  Alkaline Phosphatase, Serum: 94 (10-22 @ 06:42)  Alkaline Phosphatase, Serum: 90 (10-21 @ 06:27)  Aspartate Aminotransferase (AST/SGOT): 27 (10-22 @ 06:42)  Aspartate Aminotransferase (AST/SGOT): 26 (10-21 @ 06:27)        10-22    134<L>  |  95<L>  |  19  ----------------------------<  94  4.6   |  29  |  0.80    Ca    9.6      22 Oct 2017 06:42    TPro  8.8<H>  /  Alb  3.3  /  TBili  0.3  /  DBili  0.1  /  AST  27  /  ALT  21  /  AlkPhos  94  10-22                        10.8   9.9   )-----------( 271      ( 22 Oct 2017 06:36 )             32.0     Medications  MEDICATIONS  (STANDING):  aspirin enteric coated 81 milliGRAM(s) Oral daily  dextrose 5%. 1000 milliLiter(s) (50 mL/Hr) IV Continuous <Continuous>  dextrose 50% Injectable 12.5 Gram(s) IV Push once  dextrose 50% Injectable 25 Gram(s) IV Push once  dextrose 50% Injectable 25 Gram(s) IV Push once  docusate sodium 100 milliGRAM(s) Oral three times a day  lidocaine   Patch 1 Patch Transdermal daily  lidocaine   Patch 1 Patch Transdermal every 24 hours  lisinopril 7.5 milliGRAM(s) Oral at bedtime  metoprolol succinate ER 50 milliGRAM(s) Oral daily  mirtazapine 7.5 milliGRAM(s) Oral at bedtime  pantoprazole    Tablet 40 milliGRAM(s) Oral before breakfast  polyethylene glycol 3350 17 Gram(s) Oral at bedtime  senna 3 Tablet(s) Oral at bedtime  spironolactone 12.5 milliGRAM(s) Oral daily  warfarin 5 milliGRAM(s) Oral once      Physical Exam  General: Patient comfortable in bed  Vital Signs Last 12 Hrs  T(F): 97.5 (10-22-17 @ 14:08), Max: 97.7 (10-22-17 @ 10:15)  HR: 93 (10-22-17 @ 14:08) (89 - 94)  BP: --  BP(mean): 76 (10-22-17 @ 14:08) (64 - 89)  RR: 18 (10-22-17 @ 14:08) (18 - 18)  SpO2: 100% (10-22-17 @ 14:08) (98% - 100%)  Neck: No palpable thyroid nodules.  CVS: S1S2, No murmurs  Respiratory: No wheezing, no crepitations  GI: Abdomen soft, bowel sounds positive  Musculoskeletal: Positive edema lower extremities bilaterally  Skin: No skin rashes, no ecchymosis    Diagnostics    Osmolality, Random Urine: Routine  Cancel Reason: Lab Operations Cancel (10-06 @ 10:48)  Osmolality, Serum: Routine (10-06 @ 10:48)  Sodium, Random Urine: Routine (10-06 @ 10:48)

## 2017-10-22 NOTE — PROGRESS NOTE ADULT - SUBJECTIVE AND OBJECTIVE BOX
VITAL SIGNS    Telemetry:  SR   Vital Signs Last 24 Hrs  T(C): 36.4 (10-22-17 @ 06:00), Max: 37 (10-21-17 @ 18:00)  T(F): 97.6 (10-22-17 @ 06:00), Max: 98.6 (10-21-17 @ 18:00)  HR: 89 (10-22-17 @ 06:00) (87 - 93)  BP: --  RR: 18 (10-22-17 @ 06:00) (18 - 18)  SpO2: 98% (10-22-17 @ 06:00) (95% - 98%)            10-21 @ 07:01  -  10-22 @ 07:00  --------------------------------------------------------  IN: 600 mL / OUT: 900 mL / NET: -300 mL    10-22 @ 07:01  -  10-22 @ 11:49  --------------------------------------------------------  IN: 240 mL / OUT: 0 mL / NET: 240 mL       Daily     Daily   Admit Wt: Drug Dosing Weight  Height (cm): 177.8 (31 Aug 2017 18:00)  Weight (kg): 55 (12 Oct 2017 10:45)  BMI (kg/m2): 17.4 (12 Oct 2017 10:45)  BSA (m2): 1.69 (12 Oct 2017 10:45)    Bilirubin Direct, Serum: 0.1 mg/dL (10-22 @ 06:42)  Bilirubin Total, Serum: 0.3 mg/dL (10-22 @ 06:42)            MEDICATIONS  aspirin enteric coated 81 milliGRAM(s) Oral daily  docusate sodium 100 milliGRAM(s) Oral three times a day  glucagon  Injectable 1 milliGRAM(s) IntraMuscular once PRN  glycerin Suppository - Adult 1 Suppository(s) Rectal every 4 hours  lidocaine   Patch 1 Patch Transdermal daily  lidocaine   Patch 1 Patch Transdermal every 24 hours  lisinopril 7.5 milliGRAM(s) Oral at bedtime  metoprolol succinate ER 50 milliGRAM(s) Oral daily  mirtazapine 7.5 milliGRAM(s) Oral at bedtime  naloxone Injectable 0.1 milliGRAM(s) IV Push every 3 minutes PRN  oxyCODONE    IR 5 milliGRAM(s) Oral every 6 hours PRN  pantoprazole    Tablet 40 milliGRAM(s) Oral before breakfast  phenylephrine 0.25% Suppository 1 Suppository(s) Rectal every 8 hours PRN  polyethylene glycol 3350 17 Gram(s) Oral at bedtime  senna 3 Tablet(s) Oral at bedtime  spironolactone 12.5 milliGRAM(s) Oral daily  warfarin 5 milliGRAM(s) Oral once      PHYSICAL EXAM    Subjective: no complaints   Neurology: alert and oriented x 3, nonfocal, no gross deficits  CV :lvad humm no JVD   Sternal Wound :  CDI , Stable healed drive line CDI no drainage   Lungs: CTA, equal chest expansion  Abdomen: soft, nontender, nondistended, positive bowel sounds, last bowel movement   :    voids  Extremities:  no edema,  +dp b/l , no calt tenderness b/l , warm and perfused b/l    LABS  10-22    134<L>  |  95<L>  |  19  ----------------------------<  94  4.6   |  29  |  0.80    Ca    9.6      22 Oct 2017 06:42    TPro  8.8<H>  /  Alb  3.3  /  TBili  0.3  /  DBili  0.1  /  AST  27  /  ALT  21  /  AlkPhos  94  10-22                                 10.8   9.9   )-----------( 271      ( 22 Oct 2017 06:36 )             32.0          PT/INR - ( 22 Oct 2017 06:36 )   PT: 18.9 sec;   INR: 1.73 ratio         PTT - ( 21 Oct 2017 06:23 )  PTT:30.7 sec         PAST MEDICAL & SURGICAL HISTORY:  No pertinent past medical history  No significant past surgical history       Physical Therapy Rec:   Home  [  ]   Home w/ PT  [ x ]  Rehab  [  ]

## 2017-10-22 NOTE — PROGRESS NOTE ADULT - PROBLEM SELECTOR PLAN 2
stable: can start spiriva on DC , pt has not been wheezing  doing ok  10/11: pt has not been wheezing and has been stable breathing wise: BD prn  10/13: the last ct scan chest on 23 of sept showed paraseptal emphysema: and left lower lobe rounded atlectasis: he would need repeat ct scan chest in december to ensure proper retisolution of the atelectasis:  10/16: remains stable: no wheezing: oxygenation is good  10/18: stable  10/20: pt has not been wheezing: can use duoneb: prn for wheezing or sob: has some chronic atelectasis in the left lower lobe  10/21: paraseptal emphysema: ols smoker; not wheezing stable: check for his home oxygen requirement prior to dc  10/22: stable : outpatient PFT upon dc

## 2017-10-23 LAB
ANION GAP SERPL CALC-SCNC: 12 MMOL/L — SIGNIFICANT CHANGE UP (ref 5–17)
BUN SERPL-MCNC: 22 MG/DL — SIGNIFICANT CHANGE UP (ref 7–23)
CALCIUM SERPL-MCNC: 9.4 MG/DL — SIGNIFICANT CHANGE UP (ref 8.4–10.5)
CHLORIDE SERPL-SCNC: 97 MMOL/L — SIGNIFICANT CHANGE UP (ref 96–108)
CO2 SERPL-SCNC: 26 MMOL/L — SIGNIFICANT CHANGE UP (ref 22–31)
CREAT SERPL-MCNC: 0.73 MG/DL — SIGNIFICANT CHANGE UP (ref 0.5–1.3)
GLUCOSE SERPL-MCNC: 101 MG/DL — HIGH (ref 70–99)
HCT VFR BLD CALC: 31.5 % — LOW (ref 39–50)
HGB BLD-MCNC: 10.5 G/DL — LOW (ref 13–17)
INR BLD: 1.6 RATIO — HIGH (ref 0.88–1.16)
LDH SERPL L TO P-CCNC: 319 U/L — HIGH (ref 50–242)
MCHC RBC-ENTMCNC: 31.3 PG — SIGNIFICANT CHANGE UP (ref 27–34)
MCHC RBC-ENTMCNC: 33.1 GM/DL — SIGNIFICANT CHANGE UP (ref 32–36)
MCV RBC AUTO: 94.3 FL — SIGNIFICANT CHANGE UP (ref 80–100)
PLATELET # BLD AUTO: 262 K/UL — SIGNIFICANT CHANGE UP (ref 150–400)
POTASSIUM SERPL-MCNC: 4.5 MMOL/L — SIGNIFICANT CHANGE UP (ref 3.5–5.3)
POTASSIUM SERPL-SCNC: 4.5 MMOL/L — SIGNIFICANT CHANGE UP (ref 3.5–5.3)
PROTHROM AB SERPL-ACNC: 17.6 SEC — HIGH (ref 9.8–12.7)
RBC # BLD: 3.34 M/UL — LOW (ref 4.2–5.8)
RBC # FLD: 13.2 % — SIGNIFICANT CHANGE UP (ref 10.3–14.5)
SODIUM SERPL-SCNC: 135 MMOL/L — SIGNIFICANT CHANGE UP (ref 135–145)
WBC # BLD: 8.6 K/UL — SIGNIFICANT CHANGE UP (ref 3.8–10.5)
WBC # FLD AUTO: 8.6 K/UL — SIGNIFICANT CHANGE UP (ref 3.8–10.5)

## 2017-10-23 PROCEDURE — 90832 PSYTX W PT 30 MINUTES: CPT

## 2017-10-23 RX ORDER — WARFARIN SODIUM 2.5 MG/1
6 TABLET ORAL ONCE
Qty: 0 | Refills: 0 | Status: DISCONTINUED | OUTPATIENT
Start: 2017-10-23 | End: 2017-10-23

## 2017-10-23 RX ORDER — WARFARIN SODIUM 2.5 MG/1
7.5 TABLET ORAL ONCE
Qty: 0 | Refills: 0 | Status: COMPLETED | OUTPATIENT
Start: 2017-10-23 | End: 2017-10-23

## 2017-10-23 RX ADMIN — LISINOPRIL 7.5 MILLIGRAM(S): 2.5 TABLET ORAL at 22:38

## 2017-10-23 RX ADMIN — SPIRONOLACTONE 12.5 MILLIGRAM(S): 25 TABLET, FILM COATED ORAL at 06:39

## 2017-10-23 RX ADMIN — Medication 81 MILLIGRAM(S): at 12:37

## 2017-10-23 RX ADMIN — OXYCODONE HYDROCHLORIDE 5 MILLIGRAM(S): 5 TABLET ORAL at 15:00

## 2017-10-23 RX ADMIN — OXYCODONE HYDROCHLORIDE 5 MILLIGRAM(S): 5 TABLET ORAL at 14:24

## 2017-10-23 RX ADMIN — PANTOPRAZOLE SODIUM 40 MILLIGRAM(S): 20 TABLET, DELAYED RELEASE ORAL at 06:38

## 2017-10-23 RX ADMIN — MIRTAZAPINE 7.5 MILLIGRAM(S): 45 TABLET, ORALLY DISINTEGRATING ORAL at 22:39

## 2017-10-23 RX ADMIN — Medication 50 MILLIGRAM(S): at 06:38

## 2017-10-23 RX ADMIN — WARFARIN SODIUM 7.5 MILLIGRAM(S): 2.5 TABLET ORAL at 22:39

## 2017-10-23 NOTE — PROGRESS NOTE ADULT - ASSESSMENT
Pt seen dressed in street clothes; frustrated about not going home today due to INR (1.6).  Reviewed reasons it is necessary for him to remain in hospital until INR level is therapeutic.  Able to teach back information accurately. Continues to report periods of frustration and low mood with extended hospitalization but less so than previously.  Has developed good rapport with several staff members which is helping his mood.  His family returned from [facility] on Saturday (10/21/17).  He enjoyed their visit yesterday but reports they can't return during week due to work responsibilities.   Ambulating in [place] multiple times per day without distress.  Appetite ok but does not like hospital food.    Receptive to support and validation.  Sleep improved.  Taking Remeron 7.5mg at bedtime.       Dx: Adjustment disorder with depression     Recommendations:  Support and validation   Will benefit from additional coping strategies to manage current stressors   Behavioral Cardiology will continue to follow

## 2017-10-23 NOTE — PROGRESS NOTE ADULT - SUBJECTIVE AND OBJECTIVE BOX
Interval Events: Pt denies abdominal pain, denies n/v, tolerating PO, having bms     HPI:62M with no PMHx here with chest pain. Began last week, described as pleuritic, worsens with cough and deep inspiration. Has been having SOB as well, dry cough. Recently vacationed in [facility] came back today. Saw doctor in [facility] but unclear what workup was. No fever, sick contacts, abd pain. CP described as midsternal, nonradiating. Has 25 pack year smoking history.  CTPA does not reveal PE, Pulm congestion noted.       MEDICATIONS  (STANDING):  aspirin enteric coated 81 milliGRAM(s) Oral daily  dextrose 5%. 1000 milliLiter(s) (50 mL/Hr) IV Continuous <Continuous>  dextrose 50% Injectable 12.5 Gram(s) IV Push once  dextrose 50% Injectable 25 Gram(s) IV Push once  dextrose 50% Injectable 25 Gram(s) IV Push once  docusate sodium 100 milliGRAM(s) Oral three times a day  glycerin Suppository - Adult 1 Suppository(s) Rectal every 4 hours  insulin lispro (HumaLOG) corrective regimen sliding scale   SubCutaneous three times a day before meals  lidocaine   Patch 1 Patch Transdermal daily  lidocaine   Patch 1 Patch Transdermal every 24 hours  lisinopril 5 milliGRAM(s) Oral at bedtime  metoprolol succinate ER 50 milliGRAM(s) Oral daily  mirtazapine 7.5 milliGRAM(s) Oral at bedtime  pantoprazole    Tablet 40 milliGRAM(s) Oral before breakfast  polyethylene glycol 3350 17 Gram(s) Oral at bedtime  senna 3 Tablet(s) Oral at bedtime  spironolactone 12.5 milliGRAM(s) Oral daily    MEDICATIONS  (PRN):  dextrose Gel 1 Dose(s) Oral once PRN Blood Glucose LESS THAN 70 milliGRAM(s)/deciLiter  glucagon  Injectable 1 milliGRAM(s) IntraMuscular once PRN Glucose <70 milliGRAM(s)/deciLiter  naloxone Injectable 0.1 milliGRAM(s) IV Push every 3 minutes PRN Sedation and respiratory depression RR < 11  oxyCODONE    IR 5 milliGRAM(s) Oral every 6 hours PRN Severe Pain (7 - 10)  phenylephrine 0.25% Suppository 1 Suppository(s) Rectal every 8 hours PRN Hemorrhoids      Allergies    No Known Allergies    Intolerances        Review of Systems:    General:  No wt loss, fevers, chills, night sweats,fatigue,   Eyes:  Good vision, no reported pain  ENT:  No sore throat, pain, runny nose, dysphagia  CV:  No pain, palpitations, hypo/hypertension  Resp:  No dyspnea, cough, tachypnea, wheezing  GI:  No pain, No nausea, No vomiting, No diarrhea, No constipation, No weight loss, No fever, No pruritis, No rectal bleeding, No melena, No dysphagia  :  No pain, bleeding, incontinence, nocturia  Muscle:  No pain, weakness  Neuro:  No weakness, tingling, memory problems  Psych:  No fatigue, insomnia, mood problems, depression  Endocrine:  No polyuria, polydypsia, cold/heat intolerance  Heme:  No petechiae, ecchymosis, easy bruisability  Skin:  No rash, tattoos, scars, edema      Vital Signs Last 24 Hrs  T(C): 36.8 (20 Oct 2017 09:53), Max: 37.2 (19 Oct 2017 20:13)  T(F): 98.3 (20 Oct 2017 09:53), Max: 98.9 (19 Oct 2017 20:13)  HR: 96 (20 Oct 2017 09:53) (96 - 106)  BP: --  BP(mean): 78 (20 Oct 2017 09:53) (68 - 80)  RR: 20 (20 Oct 2017 09:53) (20 - 20)  SpO2: 95% (20 Oct 2017 09:53) (95% - 98%)    PHYSICAL EXAM:    Constitutional: NAD, well-developed  HEENT: EOMI, throat clear  Neck: No LAD, supple  Respiratory: CTA and P  Cardiovascular: S1 and S2, RRR, no M  Gastrointestinal: BS+, soft, NT/ND, neg HSM,  Extremities: No peripheral edema, neg clubing, cyanosis  Vascular: 2+ peripheral pulses  Neurological: A/O x 3, no focal deficits  Psychiatric: Normal mood, normal affect  Skin: No rashes          LABS:                        9.5    10.9  )-----------( 297      ( 20 Oct 2017 06:00 )             29.0     10-20    136  |  96  |  16  ----------------------------<  89  4.4   |  30  |  0.71    Ca    9.8      20 Oct 2017 06:00      PT/INR - ( 20 Oct 2017 06:35 )   PT: 18.3 sec;   INR: 1.68 ratio         PTT - ( 20 Oct 2017 06:35 )  PTT:33.6 sec      RADIOLOGY & ADDITIONAL TESTS:

## 2017-10-23 NOTE — PROGRESS NOTE ADULT - PROBLEM SELECTOR PLAN 2
stable: can start spiriva on DC , pt has not been wheezing  doing ok  10/11: pt has not been wheezing and has been stable breathing wise: BD prn  10/13: the last ct scan chest on 23 of sept showed paraseptal emphysema: and left lower lobe rounded atlectasis: he would need repeat ct scan chest in december to ensure proper retisolution of the atelectasis:  10/16: remains stable: no wheezing: oxygenation is good  10/18: stable  10/20: pt has not been wheezing: can use duoneb: prn for wheezing or sob: has some chronic atelectasis in the left lower lobe  10/21: paraseptal emphysema: ols smoker; not wheezing stable: check for his home oxygen requirement prior to dc  10/22: stable : outpatient PFT upon dc  10/23: stable: off medications

## 2017-10-23 NOTE — PROGRESS NOTE ADULT - PROBLEM SELECTOR PLAN 3
S/P LVAD : doing better: care per CTS:  Heart failure team following  10/16: stable  10/18L: stable : card following  10/20: stable : cont current treatment: s/p LVAD: stable  10/21: doing ok  10/22: stable  10/23: stable. awaiting INR to become therapeutic

## 2017-10-23 NOTE — CHART NOTE - NSCHARTNOTEFT_GEN_A_CORE
Source: Patient [ X]    Family [ ]     other [ X] N, medical record      Per chart, Pt with Stage D HF s/p LVAD placement with ongoing support and education. Discharge planning.     Diet : Regular, Ensure enlive x3       Patient reports no GI distress      PO intake:  10-50% however noted Pt is consuming food provided by family as well.      Source for PO intake [ X] Patient [ ] family [X ] chart [ ] staff [ ] other      Current Weight: 53.1kg, admission Wt: 61kg. Wt fluctuating slightly ans Wt on 10/19 was 55.1kg. Monitor Closely       Pertinent Medications: MEDICATIONS  (STANDING):  aspirin enteric coated 81 milliGRAM(s) Oral daily  dextrose 50% Injectable 25 Gram(s) IV Push once  lidocaine   Patch 1 Patch Transdermal daily  lidocaine   Patch 1 Patch Transdermal every 24 hours  lisinopril 7.5 milliGRAM(s) Oral at bedtime  metoprolol succinate ER 50 milliGRAM(s) Oral daily  mirtazapine 7.5 milliGRAM(s) Oral at bedtime  pantoprazole    Tablet 40 milliGRAM(s) Oral before breakfast  polyethylene glycol 3350 17 Gram(s) Oral at bedtime  spironolactone 12.5 milliGRAM(s) Oral daily  warfarin 6 milliGRAM(s) Oral once    MEDICATIONS  (PRN):  dextrose Gel 1 Dose(s) Oral once PRN Blood Glucose LESS THAN 70 milliGRAM(s)/deciLiter  oxyCODONE    IR 5 milliGRAM(s) Oral every 6 hours PRN Severe Pain (7 - 10)    Pertinent Labs:  Hgb/Hct:10.5/31/5-low, Na:135, K:4.5, Cl:95, BUN:22, Cr:0.73, Glucose:101-high, Ca:9.4, Total Protein:8.8-high, Albumin:3.3, Total Bilirubin:0.3, Direct Bilirubin:0.1, AlkPhos:94, AST:27, ALT:21,       Skin: intact     Estimated Needs:   [X] no change since previous assessment  [ ] recalculated:       Previous Nutrition Diagnosis:    [ X] Increased Nutrient Needs and Malnutrition: Improving with fair to good PO intake as well as PO intake of Ensure enlive   [ ] Food & Nutrition Related Knowledge Deficit: Improving with diet education reinforcement          Nutrition Diagnosis is [ X] ongoing          New Nutrition Diagnosis: [ X] not applicable         Recommend  1. Provide food preferences as requested by Pt/family within diet restrictions    2. Encourage PO intake during meal times   3. Continue HF and coumadin diet education review  4. Continue ensure enlive x2 daily          Monitoring and Evaluation:     [X ] PO intake [ X] Tolerance to diet prescription [ X] weights [X ] follow up per protocol    [X ] other: RD remains available Sarah Siegler RD. Pager #[phone] Source: Patient [ X]    Family [ ]     other [ X] RN, medical record    Pt seen, reports feeling okay. States he has a good appetite and is eating meals from home. Pt is drinking Ensure enlive when he is eating poorly. RD attempted to review HF and coumadin education. Pt was amenable to listen to education however reluctant to use teach back points at this time. Pt keeps repeating, "I just want to go home"     Per chart, Pt with Stage D HF s/p LVAD placement with ongoing support and education. Discharge planning; awaiting therapeutic INR levels.    Diet : Regular, Ensure enlive x3       Patient reports no GI distress      PO intake:  10-50% however noted Pt is consuming food provided by family as well.      Source for PO intake [ X] Patient [ ] family [X ] chart [ ] staff [ ] other      Current Weight: 53.1kg, admission Wt: 61kg. Wt fluctuating slightly ans Wt on 10/19 was 55.1kg. Monitor Closely       Pertinent Medications: MEDICATIONS  (STANDING):  aspirin enteric coated 81 milliGRAM(s) Oral daily  dextrose 50% Injectable 25 Gram(s) IV Push once  lidocaine   Patch 1 Patch Transdermal daily  lidocaine   Patch 1 Patch Transdermal every 24 hours  lisinopril 7.5 milliGRAM(s) Oral at bedtime  metoprolol succinate ER 50 milliGRAM(s) Oral daily  mirtazapine 7.5 milliGRAM(s) Oral at bedtime  pantoprazole    Tablet 40 milliGRAM(s) Oral before breakfast  polyethylene glycol 3350 17 Gram(s) Oral at bedtime  spironolactone 12.5 milliGRAM(s) Oral daily  warfarin 6 milliGRAM(s) Oral once    MEDICATIONS  (PRN):  dextrose Gel 1 Dose(s) Oral once PRN Blood Glucose LESS THAN 70 milliGRAM(s)/deciLiter  oxyCODONE    IR 5 milliGRAM(s) Oral every 6 hours PRN Severe Pain (7 - 10)    Pertinent Labs:  Hgb/Hct:10.5/31/5-low, Na:135, K:4.5, Cl:95, BUN:22, Cr:0.73, Glucose:101-high, Ca:9.4, Total Protein:8.8-high, Albumin:3.3, Total Bilirubin:0.3, Direct Bilirubin:0.1, AlkPhos:94, AST:27, ALT:21,       Skin: intact     Estimated Needs:   [X] no change since previous assessment  [ ] recalculated:       Previous Nutrition Diagnosis:    [ X] Increased Nutrient Needs and Malnutrition: Improving with fair to good PO intake as well as PO intake of Ensure enlive   [ ] Food & Nutrition Related Knowledge Deficit: continue education reinforcement.       Nutrition Diagnosis is [ X] ongoing          New Nutrition Diagnosis: [ X] not applicable         Recommend  1. Provide food preferences as requested by Pt/family within diet restrictions    2. Encourage PO intake during meal times   3. Continue HF and coumadin diet education review  4. Continue ensure enlive x2 daily          Monitoring and Evaluation:     [X ] PO intake [ X] Tolerance to diet prescription [ X] weights [X ] follow up per protocol    [X ] other: RD remains available Sarah Siegler RD. Pager #[phone]

## 2017-10-23 NOTE — CHART NOTE - NSCHARTNOTESELECT_GEN_ALL_CORE
CCU [place] Rounds/Event Note
Event Note
Nutrition Services
Event Note
Event Note
Event Note/Midnight Round Note
Nutrition Services
Transfer Note

## 2017-10-23 NOTE — PROGRESS NOTE ADULT - SUBJECTIVE AND OBJECTIVE BOX
Chief complaint  Patient is a 62y old  Male who presents with a chief complaint of SOOB (25 Aug 2017 22:27)   Review of systems  Patient in bed, comfortable, no fever,  no hypoglycemia.    Labs and Fingersticks    CAPILLARY BLOOD GLUCOSE    Anion Gap, Serum: 12 (10-23 @ 07:06)  Anion Gap, Serum: 10 (10-22 @ 06:42)      Calcium, Total Serum: 9.4 (10-23 @ 07:06)  Calcium, Total Serum: 9.6 (10-22 @ 06:42)  Albumin, Serum: 3.3 (10-22 @ 06:42)    Alanine Aminotransferase (ALT/SGPT): 21 (10-22 @ 06:42)  Alkaline Phosphatase, Serum: 94 (10-22 @ 06:42)  Aspartate Aminotransferase (AST/SGOT): 27 (10-22 @ 06:42)        10-23    135  |  97  |  22  ----------------------------<  101<H>  4.5   |  26  |  0.73    Ca    9.4      23 Oct 2017 07:06    TPro  8.8<H>  /  Alb  3.3  /  TBili  0.3  /  DBili  0.1  /  AST  27  /  ALT  21  /  AlkPhos  94  10-22                        10.5   8.6   )-----------( 262      ( 23 Oct 2017 07:06 )             31.5     Medications  MEDICATIONS  (STANDING):  aspirin enteric coated 81 milliGRAM(s) Oral daily  dextrose 50% Injectable 25 Gram(s) IV Push once  lidocaine   Patch 1 Patch Transdermal daily  lidocaine   Patch 1 Patch Transdermal every 24 hours  lisinopril 7.5 milliGRAM(s) Oral at bedtime  metoprolol succinate ER 50 milliGRAM(s) Oral daily  mirtazapine 7.5 milliGRAM(s) Oral at bedtime  pantoprazole    Tablet 40 milliGRAM(s) Oral before breakfast  polyethylene glycol 3350 17 Gram(s) Oral at bedtime  spironolactone 12.5 milliGRAM(s) Oral daily  warfarin 6 milliGRAM(s) Oral once      Physical Exam  General: Patient comfortable in bed  Vital Signs Last 12 Hrs  T(F): 98 (10-23-17 @ 14:00), Max: 98.4 (10-23-17 @ 06:30)  HR: 92 (10-23-17 @ 14:00) (92 - 104)  BP: --  BP(mean): 70 (10-23-17 @ 14:00) (70 - 76)  RR: 18 (10-23-17 @ 14:00) (18 - 18)  SpO2: 100% (10-23-17 @ 14:00) (99% - 100%)  Neck: No palpable thyroid nodules.  CVS: S1S2, No murmurs  Respiratory: No wheezing, no crepitations  GI: Abdomen soft, bowel sounds positive  Musculoskeletal: Positive edema lower extremities bilaterally  Skin: No skin rashes, no ecchymosis    Diagnostics    Osmolality, Random Urine: Routine  Cancel Reason: Lab Operations Cancel (10-06 @ 10:48)  Osmolality, Serum: Routine (10-06 @ 10:48)  Sodium, Random Urine: Routine (10-06 @ 10:48)

## 2017-10-23 NOTE — PROGRESS NOTE ADULT - ASSESSMENT
63 y/o Creole speaking male  presented with abdominal pain and  found to be in low output heart failure with cardiogenic shock (EF 10%) s/p 9/12 LVAD/TV annuloplasty for INTERMACS 2   Postop Course:   Acute blood loss anemia 2/2 blood loss postop -->requiring PRBC, amicar, PLT, cryo.  TTE 9/13 LVEDD 5.9 cm, mild-mod MR, aortic valve closed, mild RV dysfunction, mild TR.   c/o L sided pain; found to have L hemithorax s/p pigtail that was removed; fluid c/w exudative with lot of RBC,   (+) Hyponatremia - renal consulted- urine studies sent / Endocrine following for SIADH work up  10/6 Random Urine: 49; Patient euthyroid and not adrenally insufficient -->Patient dx with SIADH, on fluid restriction.  (+) depressive mood --> psych following; Remeron initiated; started at 7.5 mg HS; avoid up titrating 2/2 risk of worsening hyponatremia    LVAD interrogated w/o events.   10/9 Small amount of rectal bleeding after bowel movement; (+) internal hemorrhoids --> Continue bowel regimen / Start Preparation H suppository TID PRN.   No further bleeding noted. pt ambulating with a walker.  Psych follow up pending  Lisinopril d/c yesterday for hypotension, stable  Pain meds increased by palliative  10/12  coumadin dosing, na improving 134, ENDO for siadh, old ivl out refusing new ivl  10/13   coumadin dosing,   lisinopril added per HF  10/14   VSS   ambulating  10/15  VSS   INR down to 1.78   repat INR this afternoon   ?Hep GTT  10/16 INR > 2- no heparin gttp indicated.  continue meds as per CHF team; vss; pt stable; MAP 76   10/17 INR>2 D/W patient family returning from [facility] on this week  pending discharge to home ambulating coumadin 5 tonight   discharge planning to home vs rehab pending placement   10/20 INR 1.6 - coumadin 45mg tonight;  and MAP 70;s    10/21 INR 1.5 coumadin 5 mg tonight; ;  pt stable-   10/23 INR 1.6; ; coumadin 6 mg po tonight   discharge planning- home when INR therapeutic

## 2017-10-23 NOTE — PROGRESS NOTE ADULT - SUBJECTIVE AND OBJECTIVE BOX
Behavioral Cardiology Progress Note     HPI: Mr. Quispe is a 62 year old man with no PMH, found to have a nonischemic cardiomyopathy with ACC/AHA stage D congestive heart failure who underwent LVAD/TV annuloplasty for INTERMACS 2 on 9/12/17.  Hospital course complicated by left sided flank pain, which has significantly improved.  Discharge delayed due to sub therapeutic INR.       Behavioral Health Assessment:     Mood: "why can't I go home today?"          Current stressors:   Adjustment to LVAD   Hospitalization      Support system/family support: Family support (family members -sister and her family) returned from [facility] on Saturday, 10/21/17.  Pt’s niece is an RN at [employer].       Coping strategies:  Limited; reports he tends to keep things to himself, reluctant to ask others for help.        Understanding of medical illness and treatment plan:  Good understanding of LVAD education as per LVAD coordinator.        MSE: Seen resting in bed.  A&Ox3.  Fairly related with intermittent eye contact. Thought process goal directed.  No abnormal thought content, denies s/i.  Mood: dysphoric.  Affect less irritable. Insight and judgment adequate.

## 2017-10-23 NOTE — PROGRESS NOTE ADULT - SUBJECTIVE AND OBJECTIVE BOX
Patient is a 62y old  Male who presents with a chief complaint of SOOB (25 Aug 2017 22:27)  pt is upset today as he was supposed to go home but now not going  says his breathing is ok       Any change in ROS:     MEDICATIONS  (STANDING):  aspirin enteric coated 81 milliGRAM(s) Oral daily  dextrose 50% Injectable 25 Gram(s) IV Push once  lidocaine   Patch 1 Patch Transdermal daily  lidocaine   Patch 1 Patch Transdermal every 24 hours  lisinopril 7.5 milliGRAM(s) Oral at bedtime  metoprolol succinate ER 50 milliGRAM(s) Oral daily  mirtazapine 7.5 milliGRAM(s) Oral at bedtime  pantoprazole    Tablet 40 milliGRAM(s) Oral before breakfast  polyethylene glycol 3350 17 Gram(s) Oral at bedtime  spironolactone 12.5 milliGRAM(s) Oral daily  warfarin 6 milliGRAM(s) Oral once    MEDICATIONS  (PRN):  dextrose Gel 1 Dose(s) Oral once PRN Blood Glucose LESS THAN 70 milliGRAM(s)/deciLiter  oxyCODONE    IR 5 milliGRAM(s) Oral every 6 hours PRN Severe Pain (7 - 10)    Vital Signs Last 24 Hrs  T(C): 36.9 (23 Oct 2017 06:30), Max: 36.9 (23 Oct 2017 06:30)  T(F): 98.4 (23 Oct 2017 06:30), Max: 98.4 (23 Oct 2017 06:30)  HR: 92 (23 Oct 2017 06:30) (90 - 98)  BP: --  BP(mean): 76 (23 Oct 2017 06:30) (76 - 80)  RR: 18 (23 Oct 2017 06:30) (18 - 18)  SpO2: 99% (23 Oct 2017 06:30) (99% - 100%)    I&O's Summary    22 Oct 2017 07:01  -  23 Oct 2017 07:00  --------------------------------------------------------  IN: 723 mL / OUT: 850 mL / NET: -127 mL    23 Oct 2017 07:01  -  23 Oct 2017 10:33  --------------------------------------------------------  IN: 240 mL / OUT: 0 mL / NET: 240 mL          Physical Exam:   GENERAL:cachectic  HEENT: ALONSO/   Atraumatic, Normocephalic  ENMT: No tonsillar erythema, exudates, or enlargement; Moist mucous membranes, Good dentition, No lesions  NECK: Supple, No JVD, Normal thyroid  CHEST/LUNG: Clear to auscultaion, ; No rales, rhonchi, wheezing, or rubs  CVS: Regular rate and rhythm; No murmurs, rubs, or gallops  GI: : Soft, Nontender, Nondistended; Bowel sounds present  NERVOUS SYSTEM:  Alert & Oriented X3  EXTREMITIES:  2+ Peripheral Pulses, No clubbing, cyanosis, or edema  LYMPH: No lymphadenopathy noted  SKIN: No rashes or lesions  ENDOCRINOLOGY: No Thyromegaly  PSYCH: Appropriate    Labs:                              10.5   8.6   )-----------( 262      ( 23 Oct 2017 07:06 )             31.5                         10.8   9.9   )-----------( 271      ( 22 Oct 2017 06:36 )             32.0                         10.4   10.7  )-----------( 282      ( 21 Oct 2017 06:23 )             31.6                         9.5    10.9  )-----------( 297      ( 20 Oct 2017 06:00 )             29.0     10-23    135  |  97  |  22  ----------------------------<  101<H>  4.5   |  26  |  0.73  10-22    134<L>  |  95<L>  |  19  ----------------------------<  94  4.6   |  29  |  0.80  10-21    135  |  96  |  17  ----------------------------<  97  4.6   |  28  |  0.71  10-20    136  |  96  |  16  ----------------------------<  89  4.4   |  30  |  0.71    Ca    9.4      23 Oct 2017 07:06  Ca    9.6      22 Oct 2017 06:42    TPro  8.8<H>  /  Alb  3.3  /  TBili  0.3  /  DBili  0.1  /  AST  27  /  ALT  21  /  AlkPhos  94  10-22  TPro  8.8<H>  /  Alb  3.3  /  TBili  0.3  /  DBili  0.1  /  AST  26  /  ALT  20  /  AlkPhos  90  10-21    CAPILLARY BLOOD GLUCOSE          LIVER FUNCTIONS - ( 22 Oct 2017 06:42 )  Alb: 3.3 g/dL / Pro: 8.8 g/dL / ALK PHOS: 94 U/L / ALT: 21 U/L RC / AST: 27 U/L / GGT: x           PT/INR - ( 23 Oct 2017 07:06 )   PT: 17.6 sec;   INR: 1.60 ratio             Cultures:             < from: Xray Chest 1 View AP- PORTABLE-Urgent (09.29.17 @ 16:37) >  EXAM:  CHEST-PORTABLE URGENT                            PROCEDURE DATE:  09/29/2017            INTERPRETATION:  AP chest with comparison to 1347 hours from the same day.    IMPRESSION: Left pigtail catheter removed.    IMPRESSION: Left-sided pigtail catheter has been removed. There is no   pneumothorax. The heart is normal in size. The patient is status post   median sternotomy. The lungs are clear.                    TIA BENAVIDES M.D., ATTENDING RADIOLOGIST  This document has been electronically signed. Sep 30 2017  4:18PM        < end of copied text >                  Studies  Chest X-RAY  CT SCAN Chest   Venous Dopplers: LE:   CT Abdomen  Others

## 2017-10-23 NOTE — PROGRESS NOTE ADULT - PROBLEM SELECTOR PROBLEM 5
Cardiac cachexia
Depression
Post-operative pain
Post-operative pain
Need for prophylactic measure
Coronary artery disease
Adjustment disorder with depressed mood
Cardiac cachexia
Cardiac cachexia
Post-operative pain
Post-operative pain
Cardiac cachexia
Encounter for palliative care
Cardiac cachexia
Coronary artery disease
Encounter for palliative care
Non-ischemic cardiomyopathy
Non-ischemic cardiomyopathy
Post-operative pain
Smoking
Cardiac cachexia
Need for prophylactic measure
Cardiac cachexia
Adjustment disorder with depressed mood
Smoking

## 2017-10-23 NOTE — PROGRESS NOTE ADULT - PROBLEM SELECTOR PLAN 1
resolved  10/18: better: no resp symtpoms  1/20: stable: n o sob , no wheezing:  10/21: resolved  10/22: stable : resolved : no resp distress  10/23: resolved: no resp distress

## 2017-10-23 NOTE — PROGRESS NOTE ADULT - ASSESSMENT
Briefly, 63 y/o Creole speaking male who presented with abdominal pain and was found to be in low output heart failure with cardiogenic shock (EF 10%) underwent LVAD/TV annuloplasty for INTERMACS 2 9/12; postop had bleeding requiring PRBC, amicar, PLT, cryo. TTE 9/13 LVEDD 5.9 cm, mild-mod MR, aortic valve closed, mild RV dysfunction, mild TR. Hospital course c/b significant L sided pain; found to have L hemithorax s/p pigtail that was removed; fluid c/w exudative with lot of RBC, culture negative. Currently looks euvolemic. Labs noted for improved hyponatremia, INR 1.6. LVAD interrogated w/o events. Labs reviewed - . Na improved. Currently compensated, stage D HF requiring HM2 as bridge to recovery.   - MAP 70s; continue lisinopril 7.5 mg daily, carlos 12.5   - continue toprol XL 50 mg daily  - ambulate as tolerated  - coumadin 7l.5 mg today; continue ASA 81 mg   - dispo planning in process; LVAD education

## 2017-10-23 NOTE — PROGRESS NOTE ADULT - SUBJECTIVE AND OBJECTIVE BOX
VITAL SIGNS    Telemetry:  rsr    Vital Signs Last 24 Hrs  T(C): 36.4 (10-23-17 @ 10:15), Max: 36.9 (10-23-17 @ 06:30)  T(F): 97.6 (10-23-17 @ 10:15), Max: 98.4 (10-23-17 @ 06:30)  HR: 104 (10-23-17 @ 10:15) (90 - 104)  BP: --  RR: 18 (10-23-17 @ 10:15) (18 - 18)  SpO2: 100% (10-23-17 @ 10:15) (99% - 100%)            10-22 @ 07:01  -  10-23 @ 07:00  --------------------------------------------------------  IN: 723 mL / OUT: 850 mL / NET: -127 mL    10-23 @ 07:01  -  10-23 @ 12:23  --------------------------------------------------------  IN: 240 mL / OUT: 0 mL / NET: 240 mL       Daily     Daily   Admit Wt: Drug Dosing Weight  Height (cm): 177.8 (31 Aug 2017 18:00)  Weight (kg): 55 (12 Oct 2017 10:45)  BMI (kg/m2): 17.4 (12 Oct 2017 10:45)  BSA (m2): 1.69 (12 Oct 2017 10:45)      CAPILLARY BLOOD GLUCOSE              aspirin enteric coated 81 milliGRAM(s) Oral daily  dextrose 50% Injectable 25 Gram(s) IV Push once  dextrose Gel 1 Dose(s) Oral once PRN  lidocaine   Patch 1 Patch Transdermal daily  lidocaine   Patch 1 Patch Transdermal every 24 hours  lisinopril 7.5 milliGRAM(s) Oral at bedtime  metoprolol succinate ER 50 milliGRAM(s) Oral daily  mirtazapine 7.5 milliGRAM(s) Oral at bedtime  oxyCODONE    IR 5 milliGRAM(s) Oral every 6 hours PRN  pantoprazole    Tablet 40 milliGRAM(s) Oral before breakfast  polyethylene glycol 3350 17 Gram(s) Oral at bedtime  spironolactone 12.5 milliGRAM(s) Oral daily  warfarin 6 milliGRAM(s) Oral once      PHYSICAL EXAM    Subjective: "Hi.   Neurology: alert and oriented x 3, nonfocal, no gross deficits  CV : tele:  RSR    Sternal Wound :  CDI TAI  drive line cdi with dressing  Lungs: clear. RR easy, unlabored   Abdomen: soft, nontender, nondistended, positive bowel sounds, + bowel movement   Neg N/V/D   :  pt voiding without difficulty   Extremities:   BLANDON; neg LE edema, neg calf tenderness.   PPP bilaterally

## 2017-10-24 DIAGNOSIS — R30.0 DYSURIA: ICD-10-CM

## 2017-10-24 LAB
ANION GAP SERPL CALC-SCNC: 12 MMOL/L — SIGNIFICANT CHANGE UP (ref 5–17)
APPEARANCE UR: ABNORMAL
BILIRUB UR-MCNC: NEGATIVE — SIGNIFICANT CHANGE UP
BUN SERPL-MCNC: 20 MG/DL — SIGNIFICANT CHANGE UP (ref 7–23)
CALCIUM SERPL-MCNC: 9.3 MG/DL — SIGNIFICANT CHANGE UP (ref 8.4–10.5)
CHLORIDE SERPL-SCNC: 97 MMOL/L — SIGNIFICANT CHANGE UP (ref 96–108)
CO2 SERPL-SCNC: 26 MMOL/L — SIGNIFICANT CHANGE UP (ref 22–31)
COLOR SPEC: YELLOW — SIGNIFICANT CHANGE UP
CREAT SERPL-MCNC: 0.69 MG/DL — SIGNIFICANT CHANGE UP (ref 0.5–1.3)
DIFF PNL FLD: ABNORMAL
GLUCOSE SERPL-MCNC: 100 MG/DL — HIGH (ref 70–99)
GLUCOSE UR QL: NEGATIVE MG/DL — SIGNIFICANT CHANGE UP
HCT VFR BLD CALC: 30.3 % — LOW (ref 39–50)
HGB BLD-MCNC: 10.3 G/DL — LOW (ref 13–17)
INR BLD: 1.58 RATIO — HIGH (ref 0.88–1.16)
KETONES UR-MCNC: NEGATIVE — SIGNIFICANT CHANGE UP
LDH SERPL L TO P-CCNC: 292 U/L — HIGH (ref 50–242)
LEUKOCYTE ESTERASE UR-ACNC: ABNORMAL
MCHC RBC-ENTMCNC: 31.7 PG — SIGNIFICANT CHANGE UP (ref 27–34)
MCHC RBC-ENTMCNC: 33.8 GM/DL — SIGNIFICANT CHANGE UP (ref 32–36)
MCV RBC AUTO: 93.7 FL — SIGNIFICANT CHANGE UP (ref 80–100)
NITRITE UR-MCNC: POSITIVE
PH UR: 7.5 — SIGNIFICANT CHANGE UP (ref 5–8)
PLATELET # BLD AUTO: 226 K/UL — SIGNIFICANT CHANGE UP (ref 150–400)
POTASSIUM SERPL-MCNC: 4.4 MMOL/L — SIGNIFICANT CHANGE UP (ref 3.5–5.3)
POTASSIUM SERPL-SCNC: 4.4 MMOL/L — SIGNIFICANT CHANGE UP (ref 3.5–5.3)
PROT UR-MCNC: ABNORMAL MG/DL
PROTHROM AB SERPL-ACNC: 17.2 SEC — HIGH (ref 9.8–12.7)
RBC # BLD: 3.24 M/UL — LOW (ref 4.2–5.8)
RBC # FLD: 13.1 % — SIGNIFICANT CHANGE UP (ref 10.3–14.5)
SODIUM SERPL-SCNC: 135 MMOL/L — SIGNIFICANT CHANGE UP (ref 135–145)
SP GR SPEC: 1.02 — SIGNIFICANT CHANGE UP (ref 1.01–1.02)
UROBILINOGEN FLD QL: 1 MG/DL — SIGNIFICANT CHANGE UP
WBC # BLD: 8.6 K/UL — SIGNIFICANT CHANGE UP (ref 3.8–10.5)
WBC # FLD AUTO: 8.6 K/UL — SIGNIFICANT CHANGE UP (ref 3.8–10.5)

## 2017-10-24 PROCEDURE — 99231 SBSQ HOSP IP/OBS SF/LOW 25: CPT

## 2017-10-24 PROCEDURE — 90832 PSYTX W PT 30 MINUTES: CPT

## 2017-10-24 RX ORDER — WARFARIN SODIUM 2.5 MG/1
7.5 TABLET ORAL ONCE
Qty: 0 | Refills: 0 | Status: COMPLETED | OUTPATIENT
Start: 2017-10-24 | End: 2017-10-24

## 2017-10-24 RX ORDER — WARFARIN SODIUM 2.5 MG/1
6 TABLET ORAL ONCE
Qty: 0 | Refills: 0 | Status: DISCONTINUED | OUTPATIENT
Start: 2017-10-24 | End: 2017-10-24

## 2017-10-24 RX ADMIN — LISINOPRIL 7.5 MILLIGRAM(S): 2.5 TABLET ORAL at 22:39

## 2017-10-24 RX ADMIN — Medication 81 MILLIGRAM(S): at 14:03

## 2017-10-24 RX ADMIN — PANTOPRAZOLE SODIUM 40 MILLIGRAM(S): 20 TABLET, DELAYED RELEASE ORAL at 06:08

## 2017-10-24 RX ADMIN — MIRTAZAPINE 7.5 MILLIGRAM(S): 45 TABLET, ORALLY DISINTEGRATING ORAL at 22:38

## 2017-10-24 RX ADMIN — WARFARIN SODIUM 7.5 MILLIGRAM(S): 2.5 TABLET ORAL at 22:39

## 2017-10-24 RX ADMIN — Medication 50 MILLIGRAM(S): at 06:08

## 2017-10-24 RX ADMIN — SPIRONOLACTONE 12.5 MILLIGRAM(S): 25 TABLET, FILM COATED ORAL at 06:08

## 2017-10-24 NOTE — PROGRESS NOTE ADULT - PROBLEM SELECTOR PLAN 2
Continue with Toprol XL 50 qd/ Lisinopril 7.5 qd  Continue with AC therapy on coumadin for a therapeutic INR goal 2-3

## 2017-10-24 NOTE — PROGRESS NOTE ADULT - ATTENDING COMMENTS
Briefly, 63 y/o Creole speaking male who presented with abdominal pain and was found to be in low output heart failure with cardiogenic shock (EF 10%) underwent LVAD/TV annuloplasty for INTERMACS 2 9/12; postop had bleeding requiring PRBC, amicar, PLT, cryo. TTE 9/13 LVEDD 5.9 cm, mild-mod MR, aortic valve closed, mild RV dysfunction, mild TR. Hospital course c/b significant L sided pain; found to have L hemithorax s/p pigtail that was removed; fluid c/w exudative with lot of RBC, culture negative. Currently looks euvolemic. Labs noted for improved hyponatremia, INR 1.6. LVAD interrogated w/o events. Labs reviewed - . Na improved. Currently compensated, stage D HF requiring HM2 as bridge to recovery.   - MAP 70s; continue lisinopril 7.5 mg daily, carlos 12.5   - continue toprol XL 50 mg daily  - ambulate as tolerated  - coumadin 7.5 mg today; continue ASA 81 mg; if INR subtherapeutic tomorrow, will consider lovenox to bridge  - dispo planning in process; LVAD education

## 2017-10-24 NOTE — PROGRESS NOTE ADULT - MINUTES
40
90
18
20
20
25
30
35
35
38
38
45
53
53
55
12
53
30
40
45
45
30
40
45
25
40
30
45
25
30
45
45
90
60
90

## 2017-10-24 NOTE — PROGRESS NOTE ADULT - SUBJECTIVE AND OBJECTIVE BOX
Subjective: Pt states "I want to go home" denies any CP, SOB, N/V and palpitations. No acute events overnight.     Telemetry: 80 - 90 SR PVC   Vital Signs Last 24 Hrs  T(F): 97.9, Max: 98.5   HR: 102   RR: 18   SpO2: 99% RA    LVAD HM II  Speed: 9000rpm  Flow: 5.7  Power: 5.6   PI: 5.7     10-24 @ 07:01  -  10-24 @ 11:30  --------------------------------------------------------  IN: 0 mL / OUT: 325 mL / NET: -325 mL      PHYSICAL EXAM  Neurology: A&Ox3, nonfocal, no gross deficits  CV : +VAD Hum  Sternal Wound: MSI CDI TAI, Stable  Lungs: Respirations non-labored, B/L BS CTA  Abdomen: Soft, NT/ND, +BSx4Q, last BM 10/23 (-)N/V/D +Driveline with DSD CDI  : Voiding without difficulty  Extremities: B/L LE no edema, negative calf tenderness          MEDICATIONS  aspirin enteric coated 81 milliGRAM(s) Oral daily  lidocaine   Patch 1 Patch Transdermal daily  lidocaine   Patch 1 Patch Transdermal every 24 hours  lisinopril 7.5 milliGRAM(s) Oral at bedtime  metoprolol succinate ER 50 milliGRAM(s) Oral daily  mirtazapine 7.5 milliGRAM(s) Oral at bedtime  oxyCODONE    IR 5 milliGRAM(s) Oral every 6 hours PRN  pantoprazole    Tablet 40 milliGRAM(s) Oral before breakfast  polyethylene glycol 3350 17 Gram(s) Oral at bedtime  spironolactone 12.5 milliGRAM(s) Oral daily  warfarin 6 milliGRAM(s) Oral once      Physical Therapy Rec:  Home w/ PT      Discussed with Cardiothoracic Team at AM rounds.

## 2017-10-24 NOTE — PROGRESS NOTE ADULT - NSHPATTENDINGPLANDISCUSS_GEN_ALL_CORE
CT NP
team
LVAD Team
team
CTU Team
LVAD Team
LVAD and CTU team
LVAD team
team
DR. Cadet
Dr Severino
team
CT PA
Dr. Barton
team
DR. Cadet
CTS team Dr jarrett
team
2 Moe MORIN team
Dr Valenzuela
team
2 Moe NP Team.
team
2 Moe MORIN team
CTS Team
CTS team this am
Dr Liriano in  rounds
cts rounds
2 Moe Cross.
Cts Team this am
2 Moe NP team.
2 Moe NP team.
CCU team, Dr. Cortez
fellow & team
fellow & team

## 2017-10-24 NOTE — PROGRESS NOTE ADULT - PROBLEM SELECTOR PLAN 1
Continue with ASA   Continue lisinopril, beta-blocker, aldactone. HF following  Daily LDH   Continue with AC therapy on coumadin for a therapeutic INR goal 2-3    D/C plan home when INR therapeutic

## 2017-10-24 NOTE — PROGRESS NOTE ADULT - ASSESSMENT
Understands reason for delay in discharge (INR 1.58).  Mood "I'm here a long time."  Continues to report periods of frustration and low mood with extended hospitalization but happy that his family has returned from [facility] and understands he will go home soon.  Has developed good rapport with several staff members which is helping his mood.  Ambulating in [place] multiple times per day without distress.  Requesting cane for ambulation (PT aware).   Appetite ok but does not like hospital food.    Receptive to support and validation.  Slept well last night. Taking Remeron 7.5mg at bedtime.       Dx: Adjustment disorder with depression     Recommendations:  Support and validation   Will benefit from additional coping strategies to manage current stressors   Behavioral Cardiology will continue to follow

## 2017-10-24 NOTE — PROGRESS NOTE ADULT - SUBJECTIVE AND OBJECTIVE BOX
Behavioral Cardiology Progress Note     HPI: Mr. Quispe is a 62 year old man with no PMH, found to have a nonischemic cardiomyopathy with ACC/AHA stage D congestive heart failure who underwent LVAD/TV annuloplasty for INTERMACS 2 on 9/12/17.  Hospital course complicated by left sided flank pain, which has significantly improved.  Discharge delayed due to sub therapeutic INR.       Behavioral Health Assessment:     Mood: "I'm here too long."          Current stressors:   Adjustment to LVAD   Hospitalization      Support system/family support: Family support (family members -sister and her family) returned from [facility] on Saturday, 10/21/17.  Pt’s niece is an RN at [employer].       Coping strategies:  Limited; reports he tends to keep things to himself, reluctant to ask others for help.  Has developed good rapport with several staff members; they are source of additional support.    Understanding of medical illness and treatment plan:  Good understanding of LVAD education as per LVAD coordinator.        MSE: Seen resting in bed.  A&Ox3.  Fairly related with intermittent eye contact. Thought process goal directed.  No abnormal thought content, denies s/i.  Mood: slightly dysphoric.  Affect less irritable. Insight and judgment adequate.

## 2017-10-24 NOTE — PROGRESS NOTE ADULT - ASSESSMENT
63 y/o Creole speaking male  presented with abdominal pain and  found to be in low output heart failure with cardiogenic shock (EF 10%) s/p 9/12 LVAD/TV annuloplasty for INTERMACS 2   Postop Course:   Acute blood loss anemia 2/2 blood loss postop -->requiring PRBC, amicar, PLT, cryo.  TTE 9/13 LVEDD 5.9 cm, mild-mod MR, aortic valve closed, mild RV dysfunction, mild TR.   c/o L sided pain; found to have L hemithorax s/p pigtail that was removed; fluid c/w exudative with lot of RBC,   (+) Hyponatremia - renal consulted- urine studies sent / Endocrine following for SIADH work up  10/6 Random Urine: 49; Patient euthyroid and not adrenally insufficient -->Patient dx with SIADH, on fluid restriction.  (+) depressive mood --> psych following; Remeron initiated; started at 7.5 mg HS; avoid up titrating 2/2 risk of worsening hyponatremia    LVAD interrogated w/o events.   10/9 Small amount of rectal bleeding after bowel movement; (+) internal hemorrhoids --> Continue bowel regimen / Start Preparation H suppository TID PRN.   No further bleeding noted. pt ambulating with a walker.  Psych follow up pending  Lisinopril d/c yesterday for hypotension, stable  Pain meds increased by palliative  10/12  coumadin dosing, na improving 134, ENDO for siadh, old ivl out refusing new ivl  10/13   coumadin dosing,   lisinopril added per HF  10/14   VSS   ambulating  10/15  VSS   INR down to 1.78   repat INR this afternoon   ?Hep GTT  10/16 INR > 2- no heparin gttp indicated.  continue meds as per CHF team; vss; pt stable; MAP 76   10/17 INR>2 D/W patient family returning from [facility] on this week  pending discharge to home ambulating coumadin 5 tonight   discharge planning to home vs rehab pending placement   10/20 INR 1.6 - coumadin 45mg tonight;  and MAP 70;s    10/21 INR 1.5 coumadin 5 mg tonight; ;  pt stable-   10/23 INR 1.6; ; coumadin 7.5 mg po tonight

## 2017-10-24 NOTE — PROGRESS NOTE ADULT - ASSESSMENT
62 year old man who presented with abdominal pain and was found to be in low output heart failure with cardiogenic shock (EF 10%). He underwent HM2 LVAD implantation with TV annuloplasty ring on 9/12. Hospital course complicated by left sided flank pain, which has significantly improved. Currently euvolemic and normotensive with MAP at goal. Discharge has been complicated by lack of rehab options, which we are investigating. Patient will be discharged to home in am with Lovenox if INR is still subtherapeutic

## 2017-10-24 NOTE — PROGRESS NOTE ADULT - PROBLEM SELECTOR PLAN 2
Daily LDH  coumadin dose 7.5 for goal INR of 2-2.5( today's inr is 1.58  continue current dose of aspirin at 81 mg daily.

## 2017-10-24 NOTE — PROGRESS NOTE ADULT - SUBJECTIVE AND OBJECTIVE BOX
Chief complaint  Patient is a 62y old  Male who presents with a chief complaint of SOOB (25 Aug 2017 22:27)   Review of systems  Patient in bed, comfortable, no fever, no hypoglycemia.    Labs and Fingersticks    CAPILLARY BLOOD GLUCOSE    Anion Gap, Serum: 12 (10-24 @ 06:15)  Anion Gap, Serum: 12 (10-23 @ 07:06)      Calcium, Total Serum: 9.3 (10-24 @ 06:15)  Calcium, Total Serum: 9.4 (10-23 @ 07:06)          10-24    135  |  97  |  20  ----------------------------<  100<H>  4.4   |  26  |  0.69    Ca    9.3      24 Oct 2017 06:15                          10.3   8.6   )-----------( 226      ( 24 Oct 2017 06:15 )             30.3     Medications  MEDICATIONS  (STANDING):  aspirin enteric coated 81 milliGRAM(s) Oral daily  dextrose 50% Injectable 25 Gram(s) IV Push once  lidocaine   Patch 1 Patch Transdermal daily  lidocaine   Patch 1 Patch Transdermal every 24 hours  lisinopril 7.5 milliGRAM(s) Oral at bedtime  metoprolol succinate ER 50 milliGRAM(s) Oral daily  mirtazapine 7.5 milliGRAM(s) Oral at bedtime  pantoprazole    Tablet 40 milliGRAM(s) Oral before breakfast  polyethylene glycol 3350 17 Gram(s) Oral at bedtime  spironolactone 12.5 milliGRAM(s) Oral daily  warfarin 7.5 milliGRAM(s) Oral once      Physical Exam  General: Patient comfortable in bed  Vital Signs Last 12 Hrs  T(F): 97.8 (10-24-17 @ 14:00), Max: 98.4 (10-24-17 @ 06:00)  HR: 100 (10-24-17 @ 14:00) (97 - 102)  BP: --  BP(mean): 74 (10-24-17 @ 14:00) (68 - 74)  RR: 18 (10-24-17 @ 14:00) (18 - 18)  SpO2: 98% (10-24-17 @ 14:00) (98% - 100%)  Neck: No palpable thyroid nodules.  CVS: S1S2, No murmurs  Respiratory: No wheezing, no crepitations  GI: Abdomen soft, bowel sounds positive  Musculoskeletal: Positive edema lower extremities bilaterally  Skin: No skin rashes, no ecchymosis    Diagnostics    Osmolality, Random Urine: Routine  Cancel Reason: Lab Operations Cancel (10-06 @ 10:48)  Osmolality, Serum: Routine (10-06 @ 10:48)  Sodium, Random Urine: Routine (10-06 @ 10:48)

## 2017-10-24 NOTE — PROGRESS NOTE ADULT - PROBLEM/PLAN-4
DISPLAY PLAN FREE TEXT

## 2017-10-24 NOTE — PROGRESS NOTE ADULT - SUBJECTIVE AND OBJECTIVE BOX
Interval Events: wants to go home    HPI:62M with no PMHx here with chest pain. Began last week, described as pleuritic, worsens with cough and deep inspiration. Has been having SOB as well, dry cough. Recently vacationed in [facility] came back today. Saw doctor in [facility] but unclear what workup was. No fever, sick contacts, abd pain. CP described as midsternal, nonradiating. Has 25 pack year smoking history.  CTPA does not reveal PE, Pulm congestion noted.       MEDICATIONS  (STANDING):  aspirin enteric coated 81 milliGRAM(s) Oral daily  dextrose 5%. 1000 milliLiter(s) (50 mL/Hr) IV Continuous <Continuous>  dextrose 50% Injectable 12.5 Gram(s) IV Push once  dextrose 50% Injectable 25 Gram(s) IV Push once  dextrose 50% Injectable 25 Gram(s) IV Push once  docusate sodium 100 milliGRAM(s) Oral three times a day  glycerin Suppository - Adult 1 Suppository(s) Rectal every 4 hours  insulin lispro (HumaLOG) corrective regimen sliding scale   SubCutaneous three times a day before meals  lidocaine   Patch 1 Patch Transdermal daily  lidocaine   Patch 1 Patch Transdermal every 24 hours  lisinopril 5 milliGRAM(s) Oral at bedtime  metoprolol succinate ER 50 milliGRAM(s) Oral daily  mirtazapine 7.5 milliGRAM(s) Oral at bedtime  pantoprazole    Tablet 40 milliGRAM(s) Oral before breakfast  polyethylene glycol 3350 17 Gram(s) Oral at bedtime  senna 3 Tablet(s) Oral at bedtime  spironolactone 12.5 milliGRAM(s) Oral daily    MEDICATIONS  (PRN):  dextrose Gel 1 Dose(s) Oral once PRN Blood Glucose LESS THAN 70 milliGRAM(s)/deciLiter  glucagon  Injectable 1 milliGRAM(s) IntraMuscular once PRN Glucose <70 milliGRAM(s)/deciLiter  naloxone Injectable 0.1 milliGRAM(s) IV Push every 3 minutes PRN Sedation and respiratory depression RR < 11  oxyCODONE    IR 5 milliGRAM(s) Oral every 6 hours PRN Severe Pain (7 - 10)  phenylephrine 0.25% Suppository 1 Suppository(s) Rectal every 8 hours PRN Hemorrhoids      Allergies    No Known Allergies    Intolerances        Review of Systems:    General:  No wt loss, fevers, chills, night sweats,fatigue,   Eyes:  Good vision, no reported pain  ENT:  No sore throat, pain, runny nose, dysphagia  CV:  No pain, palpitations, hypo/hypertension  Resp:  No dyspnea, cough, tachypnea, wheezing  GI:  No pain, No nausea, No vomiting, No diarrhea, No constipation, No weight loss, No fever, No pruritis, No rectal bleeding, No melena, No dysphagia  :  No pain, bleeding, incontinence, nocturia  Muscle:  No pain, weakness  Neuro:  No weakness, tingling, memory problems  Psych:  No fatigue, insomnia, mood problems, depression  Endocrine:  No polyuria, polydypsia, cold/heat intolerance  Heme:  No petechiae, ecchymosis, easy bruisability  Skin:  No rash, tattoos, scars, edema      Vital Signs Last 24 Hrs  T(C): 36.8 (20 Oct 2017 09:53), Max: 37.2 (19 Oct 2017 20:13)  T(F): 98.3 (20 Oct 2017 09:53), Max: 98.9 (19 Oct 2017 20:13)  HR: 96 (20 Oct 2017 09:53) (96 - 106)  BP: --  BP(mean): 78 (20 Oct 2017 09:53) (68 - 80)  RR: 20 (20 Oct 2017 09:53) (20 - 20)  SpO2: 95% (20 Oct 2017 09:53) (95% - 98%)    PHYSICAL EXAM:    Constitutional: NAD, well-developed  HEENT: EOMI, throat clear  Neck: No LAD, supple  Respiratory: CTA and P  Cardiovascular: S1 and S2, RRR, no M  Gastrointestinal: BS+, soft, NT/ND, neg HSM,  Extremities: No peripheral edema, neg clubing, cyanosis  Vascular: 2+ peripheral pulses  Neurological: A/O x 3, no focal deficits  Psychiatric: Normal mood, normal affect  Skin: No rashes          LABS:                        9.5    10.9  )-----------( 297      ( 20 Oct 2017 06:00 )             29.0     10-20    136  |  96  |  16  ----------------------------<  89  4.4   |  30  |  0.71    Ca    9.8      20 Oct 2017 06:00      PT/INR - ( 20 Oct 2017 06:35 )   PT: 18.3 sec;   INR: 1.68 ratio         PTT - ( 20 Oct 2017 06:35 )  PTT:33.6 sec      RADIOLOGY & ADDITIONAL TESTS:

## 2017-10-24 NOTE — PROGRESS NOTE ADULT - SUBJECTIVE AND OBJECTIVE BOX
HPI:  62M with no PMHx here with chest pain. Began last week, described as pleuritic, worsens with cough and deep inspiration. Has been having SOB as well, dry cough. Recently vacationed in [facility] came back today. Saw doctor in [facility] but unclear what workup was. No fever, sick contacts, abd pain. CP described as midsternal, nonradiating. Has 25 pack year smoking history.  CTPA does not reveal PE, Pulm congestion noted. (25 Aug 2017 22:27)    PMH:   No pertinent past medical history    PSH:   No significant past surgical history    Medications:   aspirin enteric coated 81 milliGRAM(s) Oral daily  dextrose 50% Injectable 25 Gram(s) IV Push once  dextrose Gel 1 Dose(s) Oral once PRN  lidocaine   Patch 1 Patch Transdermal daily  lidocaine   Patch 1 Patch Transdermal every 24 hours  lisinopril 7.5 milliGRAM(s) Oral at bedtime  metoprolol succinate ER 50 milliGRAM(s) Oral daily  mirtazapine 7.5 milliGRAM(s) Oral at bedtime  oxyCODONE    IR 5 milliGRAM(s) Oral every 6 hours PRN  pantoprazole    Tablet 40 milliGRAM(s) Oral before breakfast  polyethylene glycol 3350 17 Gram(s) Oral at bedtime  spironolactone 12.5 milliGRAM(s) Oral daily  warfarin 7.5 milliGRAM(s) Oral once    Allergies:  No Known Allergies    FAMILY HISTORY:  No pertinent family history in first degree relatives    LVAD Interrogation: no alarms  Pump Flow: 5.7  Pump Speed: 9000  Pulse Index: 5.7  Pump Power: 5.6  VAD Events: none  Driveline evaluation:  C/D/I  Programming Changes: [X ] No changes made [ ] Changes made:        Review of Systems:  Constitutional: [ ] Fever [ ] Chills [ ] Fatigue [ ] Weight change   HEENT: [ ] Blurred vision [ ] Eye Pain [ ] Headache [ ] Runny nose [ ] Sore Throat   Respiratory: [ ] Cough [ ] Wheezing [ ] Shortness of breath  Cardiovascular: [ ] Chest Pain [ ] Palpitations [ ] PEÑA [ ] PND [ ] Orthopnea  Gastrointestinal: [ ] Abdominal Pain [ ] Diarrhea [ ] Constipation [ ] Hemorrhoids [ ] Nausea [ ] Vomiting  Genitourinary: [ ] Nocturia [X ] Dysuria [ ] Incontinence  Extremities: [ ] Swelling [ ] Joint Pain  Neurologic: [ ] Focal deficit [ ] Paresthesias [ ] Syncope  Lymphatic: [ ] Swelling [ ] Lymphadenopathy   Skin: [ ] Rash [ ] Ecchymoses [ ] Wounds [ ] Lesions  Psychiatry: [ ] Depression [ ] Suicidal/Homicidal Ideation [ ] Anxiety [ ] Sleep Disturbances  [X ] 10 point review of systems is otherwise negative except as mentioned above            [ ]Unable to obtain    Physical Exam:  T(C): 36.6 (10-24-17 @ 14:00), Max: 36.9 (10-23-17 @ 18:00)  HR: 100 (10-24-17 @ 14:00) (92 - 102)  MAP: 68  RR: 18 (10-24-17 @ 14:00) (18 - 18)  SpO2: 98% (10-24-17 @ 14:00) (98% - 100%)  Wt(kg): --  I&O's Detail    23 Oct 2017 07:  -  24 Oct 2017 07:00  --------------------------------------------------------  IN:    Oral Fluid: 560 mL  Total IN: 560 mL    OUT:    Voided: 1000 mL  Total OUT: 1000 mL    Total NET: -440 mL      24 Oct 2017 07:01  -  24 Oct 2017 15:46  --------------------------------------------------------  IN:    Oral Fluid: 560 mL  Total IN: 560 mL    OUT:    Voided: 475 mL  Total OUT: 475 mL    Total NET: 85 mL      Daily Weight in k.2 (21 Oct 2017 08:00), Weight in k.2 (20 Oct 2017 11:00), Weight in k.1 (19 Oct 2017 10:15)    Appearance: [X ] Normal [ ] NAD  Eyes: [X ] PERRL [ ] EOMI  HENT: [ ] Normal oral [place] [ ]NC/AT  Cardiovascular: [ ] S1 [ ] S2 [ ] RRR [ ] No m/r/g [ ]No edema [ ] JVP + hum of LVAD  Respiratory: [X ] Clear to auscultation bilaterally  Gastrointestinal: [X ] Soft [ X] Non-tender [X ] Non-distended [ ] BS+  Musculoskeletal: [X ] No clubbing [ ] No joint deformity   Neurologic: [ X] Non-focal  Lymphatic: [ ] No lymphadenopathy  Psychiatry: [X ] AAOx3 [ ] Mood & affect appropriate  Skin: [X ] No rashes [ ] No ecchymoses [ ] No cyanosis    Labs:                        10.3   8.6   )-----------( 226      ( 24 Oct 2017 06:15 )             30.3     10    135  |  97  |  20  ----------------------------<  100<H>  4.4   |  26  |  0.69    Ca    9.3      24 Oct 2017 06:15      PT/INR - ( 24 Oct 2017 06:15 )   PT: 17.2 sec;   INR: 1.58 ratio         [id number]

## 2017-10-24 NOTE — PROGRESS NOTE ADULT - PROBLEM SELECTOR PLAN 1
ContinueToprol XL to 50 mg daily in am  Continue lisinopril 7.5 mg daily at bedtime  ContSpironolactone 12.5 mg po daily.

## 2017-10-25 VITALS — RESPIRATION RATE: 18 BRPM | TEMPERATURE: 99 F | HEART RATE: 101 BPM | OXYGEN SATURATION: 98 %

## 2017-10-25 LAB
ANION GAP SERPL CALC-SCNC: 10 MMOL/L — SIGNIFICANT CHANGE UP (ref 5–17)
BACTERIA # UR AUTO: NEGATIVE — SIGNIFICANT CHANGE UP
BUN SERPL-MCNC: 21 MG/DL — SIGNIFICANT CHANGE UP (ref 7–23)
CALCIUM SERPL-MCNC: 9.2 MG/DL — SIGNIFICANT CHANGE UP (ref 8.4–10.5)
CHLORIDE SERPL-SCNC: 96 MMOL/L — SIGNIFICANT CHANGE UP (ref 96–108)
CO2 SERPL-SCNC: 27 MMOL/L — SIGNIFICANT CHANGE UP (ref 22–31)
CREAT SERPL-MCNC: 0.63 MG/DL — SIGNIFICANT CHANGE UP (ref 0.5–1.3)
EPI CELLS # UR: 0 /HPF — SIGNIFICANT CHANGE UP (ref 0–5)
GLUCOSE SERPL-MCNC: 98 MG/DL — SIGNIFICANT CHANGE UP (ref 70–99)
HCT VFR BLD CALC: 29.2 % — LOW (ref 39–50)
HGB BLD-MCNC: 9.9 G/DL — LOW (ref 13–17)
HYALINE CASTS # UR AUTO: 3 /LPF — SIGNIFICANT CHANGE UP (ref 0–7)
INR BLD: 1.74 RATIO — HIGH (ref 0.88–1.16)
LDH SERPL L TO P-CCNC: 298 U/L — HIGH (ref 50–242)
MCHC RBC-ENTMCNC: 31.8 PG — SIGNIFICANT CHANGE UP (ref 27–34)
MCHC RBC-ENTMCNC: 34 GM/DL — SIGNIFICANT CHANGE UP (ref 32–36)
MCV RBC AUTO: 93.6 FL — SIGNIFICANT CHANGE UP (ref 80–100)
PLATELET # BLD AUTO: 231 K/UL — SIGNIFICANT CHANGE UP (ref 150–400)
POTASSIUM SERPL-MCNC: 4.5 MMOL/L — SIGNIFICANT CHANGE UP (ref 3.5–5.3)
POTASSIUM SERPL-SCNC: 4.5 MMOL/L — SIGNIFICANT CHANGE UP (ref 3.5–5.3)
PROTHROM AB SERPL-ACNC: 19.2 SEC — HIGH (ref 9.8–12.7)
RBC # BLD: 3.12 M/UL — LOW (ref 4.2–5.8)
RBC # FLD: 13 % — SIGNIFICANT CHANGE UP (ref 10.3–14.5)
RBC CASTS # UR COMP ASSIST: 15 /HPF — HIGH (ref 0–4)
SODIUM SERPL-SCNC: 133 MMOL/L — LOW (ref 135–145)
WBC # BLD: 14 K/UL — HIGH (ref 3.8–10.5)
WBC # FLD AUTO: 14 K/UL — HIGH (ref 3.8–10.5)
WBC UR QL: >720 /HPF — HIGH (ref 0–5)

## 2017-10-25 PROCEDURE — 84443 ASSAY THYROID STIM HORMONE: CPT

## 2017-10-25 PROCEDURE — 83880 ASSAY OF NATRIURETIC PEPTIDE: CPT

## 2017-10-25 PROCEDURE — 84484 ASSAY OF TROPONIN QUANT: CPT

## 2017-10-25 PROCEDURE — 82565 ASSAY OF CREATININE: CPT

## 2017-10-25 PROCEDURE — 84156 ASSAY OF PROTEIN URINE: CPT

## 2017-10-25 PROCEDURE — 83735 ASSAY OF MAGNESIUM: CPT

## 2017-10-25 PROCEDURE — 94002 VENT MGMT INPAT INIT DAY: CPT

## 2017-10-25 PROCEDURE — 85014 HEMATOCRIT: CPT

## 2017-10-25 PROCEDURE — 76700 US EXAM ABDOM COMPLETE: CPT

## 2017-10-25 PROCEDURE — 87633 RESP VIRUS 12-25 TARGETS: CPT

## 2017-10-25 PROCEDURE — 85610 PROTHROMBIN TIME: CPT

## 2017-10-25 PROCEDURE — 82553 CREATINE MB FRACTION: CPT

## 2017-10-25 PROCEDURE — 85027 COMPLETE CBC AUTOMATED: CPT

## 2017-10-25 PROCEDURE — P9012: CPT

## 2017-10-25 PROCEDURE — 84550 ASSAY OF BLOOD/URIC ACID: CPT

## 2017-10-25 PROCEDURE — 87206 SMEAR FLUORESCENT/ACID STAI: CPT

## 2017-10-25 PROCEDURE — 89051 BODY FLUID CELL COUNT: CPT

## 2017-10-25 PROCEDURE — 80076 HEPATIC FUNCTION PANEL: CPT

## 2017-10-25 PROCEDURE — 83690 ASSAY OF LIPASE: CPT

## 2017-10-25 PROCEDURE — 75561 CARDIAC MRI FOR MORPH W/DYE: CPT

## 2017-10-25 PROCEDURE — 88305 TISSUE EXAM BY PATHOLOGIST: CPT

## 2017-10-25 PROCEDURE — 83930 ASSAY OF BLOOD OSMOLALITY: CPT

## 2017-10-25 PROCEDURE — 99285 EMERGENCY DEPT VISIT HI MDM: CPT

## 2017-10-25 PROCEDURE — 87070 CULTURE OTHR SPECIMN AEROBIC: CPT

## 2017-10-25 PROCEDURE — 83010 ASSAY OF HAPTOGLOBIN QUANT: CPT

## 2017-10-25 PROCEDURE — 87086 URINE CULTURE/COLONY COUNT: CPT

## 2017-10-25 PROCEDURE — 86780 TREPONEMA PALLIDUM: CPT

## 2017-10-25 PROCEDURE — 82803 BLOOD GASES ANY COMBINATION: CPT

## 2017-10-25 PROCEDURE — 97530 THERAPEUTIC ACTIVITIES: CPT

## 2017-10-25 PROCEDURE — 86900 BLOOD TYPING SEROLOGIC ABO: CPT

## 2017-10-25 PROCEDURE — 83036 HEMOGLOBIN GLYCOSYLATED A1C: CPT

## 2017-10-25 PROCEDURE — 93321 DOPPLER ECHO F-UP/LMTD STD: CPT

## 2017-10-25 PROCEDURE — 94799 UNLISTED PULMONARY SVC/PX: CPT

## 2017-10-25 PROCEDURE — 83050 HGB METHEMOGLOBIN QUAN: CPT

## 2017-10-25 PROCEDURE — 86850 RBC ANTIBODY SCREEN: CPT

## 2017-10-25 PROCEDURE — 87116 MYCOBACTERIA CULTURE: CPT

## 2017-10-25 PROCEDURE — 87040 BLOOD CULTURE FOR BACTERIA: CPT

## 2017-10-25 PROCEDURE — 71250 CT THORAX DX C-: CPT

## 2017-10-25 PROCEDURE — 87641 MR-STAPH DNA AMP PROBE: CPT

## 2017-10-25 PROCEDURE — 82962 GLUCOSE BLOOD TEST: CPT

## 2017-10-25 PROCEDURE — 87075 CULTR BACTERIA EXCEPT BLOOD: CPT

## 2017-10-25 PROCEDURE — 87205 SMEAR GRAM STAIN: CPT

## 2017-10-25 PROCEDURE — 93451 RIGHT HEART CATH: CPT

## 2017-10-25 PROCEDURE — 86891 AUTOLOGOUS BLOOD OP SALVAGE: CPT

## 2017-10-25 PROCEDURE — 80053 COMPREHEN METABOLIC PANEL: CPT

## 2017-10-25 PROCEDURE — 82533 TOTAL CORTISOL: CPT

## 2017-10-25 PROCEDURE — 82435 ASSAY OF BLOOD CHLORIDE: CPT

## 2017-10-25 PROCEDURE — 97116 GAIT TRAINING THERAPY: CPT

## 2017-10-25 PROCEDURE — 97161 PT EVAL LOW COMPLEX 20 MIN: CPT

## 2017-10-25 PROCEDURE — 86778 TOXOPLASMA ANTIBODY IGM: CPT

## 2017-10-25 PROCEDURE — 80061 LIPID PANEL: CPT

## 2017-10-25 PROCEDURE — 84436 ASSAY OF TOTAL THYROXINE: CPT

## 2017-10-25 PROCEDURE — 86923 COMPATIBILITY TEST ELECTRIC: CPT

## 2017-10-25 PROCEDURE — 84132 ASSAY OF SERUM POTASSIUM: CPT

## 2017-10-25 PROCEDURE — 87389 HIV-1 AG W/HIV-1&-2 AB AG IA: CPT

## 2017-10-25 PROCEDURE — 97164 PT RE-EVAL EST PLAN CARE: CPT

## 2017-10-25 PROCEDURE — 84157 ASSAY OF PROTEIN OTHER: CPT

## 2017-10-25 PROCEDURE — 82570 ASSAY OF URINE CREATININE: CPT

## 2017-10-25 PROCEDURE — 80048 BASIC METABOLIC PNL TOTAL CA: CPT

## 2017-10-25 PROCEDURE — 87186 SC STD MICRODIL/AGAR DIL: CPT

## 2017-10-25 PROCEDURE — G0480: CPT

## 2017-10-25 PROCEDURE — 93923 UPR/LXTR ART STDY 3+ LVLS: CPT

## 2017-10-25 PROCEDURE — 84300 ASSAY OF URINE SODIUM: CPT

## 2017-10-25 PROCEDURE — 86480 TB TEST CELL IMMUN MEASURE: CPT

## 2017-10-25 PROCEDURE — 74018 RADEX ABDOMEN 1 VIEW: CPT

## 2017-10-25 PROCEDURE — 81001 URINALYSIS AUTO W/SCOPE: CPT

## 2017-10-25 PROCEDURE — 94010 BREATHING CAPACITY TEST: CPT

## 2017-10-25 PROCEDURE — 82436 ASSAY OF URINE CHLORIDE: CPT

## 2017-10-25 PROCEDURE — 36430 TRANSFUSION BLD/BLD COMPNT: CPT

## 2017-10-25 PROCEDURE — 85384 FIBRINOGEN ACTIVITY: CPT

## 2017-10-25 PROCEDURE — 83615 LACTATE (LD) (LDH) ENZYME: CPT

## 2017-10-25 PROCEDURE — G0103: CPT

## 2017-10-25 PROCEDURE — 82550 ASSAY OF CK (CPK): CPT

## 2017-10-25 PROCEDURE — 71275 CT ANGIOGRAPHY CHEST: CPT

## 2017-10-25 PROCEDURE — 85652 RBC SED RATE AUTOMATED: CPT

## 2017-10-25 PROCEDURE — 36600 WITHDRAWAL OF ARTERIAL BLOOD: CPT

## 2017-10-25 PROCEDURE — 85730 THROMBOPLASTIN TIME PARTIAL: CPT

## 2017-10-25 PROCEDURE — 85379 FIBRIN DEGRADATION QUANT: CPT

## 2017-10-25 PROCEDURE — P9041: CPT

## 2017-10-25 PROCEDURE — 93308 TTE F-UP OR LMTD: CPT

## 2017-10-25 PROCEDURE — 94640 AIRWAY INHALATION TREATMENT: CPT

## 2017-10-25 PROCEDURE — 84134 ASSAY OF PREALBUMIN: CPT

## 2017-10-25 PROCEDURE — 32557 INSERT CATH PLEURA W/ IMAGE: CPT

## 2017-10-25 PROCEDURE — 84295 ASSAY OF SERUM SODIUM: CPT

## 2017-10-25 PROCEDURE — P9017: CPT

## 2017-10-25 PROCEDURE — 82330 ASSAY OF CALCIUM: CPT

## 2017-10-25 PROCEDURE — 94660 CPAP INITIATION&MGMT: CPT

## 2017-10-25 PROCEDURE — C1889: CPT

## 2017-10-25 PROCEDURE — C8924: CPT

## 2017-10-25 PROCEDURE — C1894: CPT

## 2017-10-25 PROCEDURE — C1769: CPT

## 2017-10-25 PROCEDURE — 82728 ASSAY OF FERRITIN: CPT

## 2017-10-25 PROCEDURE — 80202 ASSAY OF VANCOMYCIN: CPT

## 2017-10-25 PROCEDURE — 87486 CHLMYD PNEUM DNA AMP PROBE: CPT

## 2017-10-25 PROCEDURE — 86022 PLATELET ANTIBODIES: CPT

## 2017-10-25 PROCEDURE — 87640 STAPH A DNA AMP PROBE: CPT

## 2017-10-25 PROCEDURE — 74263 CT COLONOGRAPHY SCREENING: CPT

## 2017-10-25 PROCEDURE — 86140 C-REACTIVE PROTEIN: CPT

## 2017-10-25 PROCEDURE — 99152 MOD SED SAME PHYS/QHP 5/>YRS: CPT

## 2017-10-25 PROCEDURE — 83935 ASSAY OF URINE OSMOLALITY: CPT

## 2017-10-25 PROCEDURE — P9037: CPT

## 2017-10-25 PROCEDURE — 87015 SPECIMEN INFECT AGNT CONCNTJ: CPT

## 2017-10-25 PROCEDURE — 31720 CLEARANCE OF AIRWAYS: CPT

## 2017-10-25 PROCEDURE — 83550 IRON BINDING TEST: CPT

## 2017-10-25 PROCEDURE — 84100 ASSAY OF PHOSPHORUS: CPT

## 2017-10-25 PROCEDURE — 80074 ACUTE HEPATITIS PANEL: CPT

## 2017-10-25 PROCEDURE — 83605 ASSAY OF LACTIC ACID: CPT

## 2017-10-25 PROCEDURE — P9016: CPT

## 2017-10-25 PROCEDURE — P9059: CPT

## 2017-10-25 PROCEDURE — C1751: CPT

## 2017-10-25 PROCEDURE — 87102 FUNGUS ISOLATION CULTURE: CPT

## 2017-10-25 PROCEDURE — 74177 CT ABD & PELVIS W/CONTRAST: CPT

## 2017-10-25 PROCEDURE — 82945 GLUCOSE OTHER FLUID: CPT

## 2017-10-25 PROCEDURE — 84145 PROCALCITONIN (PCT): CPT

## 2017-10-25 PROCEDURE — 87581 M.PNEUMON DNA AMP PROBE: CPT

## 2017-10-25 PROCEDURE — 84480 ASSAY TRIIODOTHYRONINE (T3): CPT

## 2017-10-25 PROCEDURE — 82150 ASSAY OF AMYLASE: CPT

## 2017-10-25 PROCEDURE — 82248 BILIRUBIN DIRECT: CPT

## 2017-10-25 PROCEDURE — C1887: CPT

## 2017-10-25 PROCEDURE — 82600 ASSAY OF CYANIDE: CPT

## 2017-10-25 PROCEDURE — 82042 OTHER SOURCE ALBUMIN QUAN EA: CPT

## 2017-10-25 PROCEDURE — 93005 ELECTROCARDIOGRAM TRACING: CPT

## 2017-10-25 PROCEDURE — 71045 X-RAY EXAM CHEST 1 VIEW: CPT

## 2017-10-25 PROCEDURE — C1729: CPT

## 2017-10-25 PROCEDURE — C8929: CPT

## 2017-10-25 PROCEDURE — 93460 R&L HRT ART/VENTRICLE ANGIO: CPT

## 2017-10-25 PROCEDURE — 86901 BLOOD TYPING SEROLOGIC RH(D): CPT

## 2017-10-25 PROCEDURE — 74174 CTA ABD&PLVS W/CONTRAST: CPT

## 2017-10-25 PROCEDURE — 84133 ASSAY OF URINE POTASSIUM: CPT

## 2017-10-25 PROCEDURE — 97110 THERAPEUTIC EXERCISES: CPT

## 2017-10-25 PROCEDURE — 74176 CT ABD & PELVIS W/O CONTRAST: CPT

## 2017-10-25 PROCEDURE — 93880 EXTRACRANIAL BILAT STUDY: CPT

## 2017-10-25 PROCEDURE — 87798 DETECT AGENT NOS DNA AMP: CPT

## 2017-10-25 PROCEDURE — P9047: CPT

## 2017-10-25 PROCEDURE — 70450 CT HEAD/BRAIN W/O DYE: CPT

## 2017-10-25 PROCEDURE — 83986 ASSAY PH BODY FLUID NOS: CPT

## 2017-10-25 PROCEDURE — 82947 ASSAY GLUCOSE BLOOD QUANT: CPT

## 2017-10-25 RX ORDER — CIPROFLOXACIN LACTATE 400MG/40ML
500 VIAL (ML) INTRAVENOUS EVERY 12 HOURS
Qty: 0 | Refills: 0 | Status: DISCONTINUED | OUTPATIENT
Start: 2017-10-25 | End: 2017-10-25

## 2017-10-25 RX ORDER — PHENAZOPYRIDINE HCL 100 MG
1 TABLET ORAL
Qty: 6 | Refills: 0 | OUTPATIENT
Start: 2017-10-25 | End: 2017-10-27

## 2017-10-25 RX ORDER — OXYCODONE HYDROCHLORIDE 5 MG/1
1 TABLET ORAL
Qty: 20 | Refills: 0
Start: 2017-10-25 | End: 2017-10-30

## 2017-10-25 RX ORDER — POLYETHYLENE GLYCOL 3350 17 G/17G
17 POWDER, FOR SOLUTION ORAL
Qty: 1 | Refills: 0 | OUTPATIENT
Start: 2017-10-25 | End: 2017-11-24

## 2017-10-25 RX ORDER — LISINOPRIL 2.5 MG/1
3 TABLET ORAL
Qty: 90 | Refills: 0 | OUTPATIENT
Start: 2017-10-25 | End: 2017-11-24

## 2017-10-25 RX ORDER — LIDOCAINE 4 G/100G
1 CREAM TOPICAL
Qty: 20 | Refills: 0 | OUTPATIENT
Start: 2017-10-25 | End: 2017-11-14

## 2017-10-25 RX ORDER — OXYCODONE HYDROCHLORIDE 5 MG/1
1 TABLET ORAL
Qty: 20 | Refills: 0 | OUTPATIENT
Start: 2017-10-25 | End: 2017-10-30

## 2017-10-25 RX ORDER — MIRTAZAPINE 45 MG/1
1 TABLET, ORALLY DISINTEGRATING ORAL
Qty: 30 | Refills: 0 | OUTPATIENT
Start: 2017-10-25 | End: 2017-11-24

## 2017-10-25 RX ORDER — METOPROLOL TARTRATE 50 MG
1 TABLET ORAL
Qty: 30 | Refills: 0 | OUTPATIENT
Start: 2017-10-25 | End: 2017-11-24

## 2017-10-25 RX ORDER — PANTOPRAZOLE SODIUM 20 MG/1
1 TABLET, DELAYED RELEASE ORAL
Qty: 30 | Refills: 0 | OUTPATIENT
Start: 2017-10-25 | End: 2017-11-24

## 2017-10-25 RX ORDER — SPIRONOLACTONE 25 MG/1
0.5 TABLET, FILM COATED ORAL
Qty: 15 | Refills: 0 | OUTPATIENT
Start: 2017-10-25 | End: 2017-11-24

## 2017-10-25 RX ORDER — ASPIRIN/CALCIUM CARB/MAGNESIUM 324 MG
1 TABLET ORAL
Qty: 30 | Refills: 0 | OUTPATIENT
Start: 2017-10-25 | End: 2017-11-24

## 2017-10-25 RX ORDER — WARFARIN SODIUM 2.5 MG/1
1 TABLET ORAL
Qty: 30 | Refills: 0 | OUTPATIENT
Start: 2017-10-25 | End: 2017-11-24

## 2017-10-25 RX ORDER — CIPROFLOXACIN LACTATE 400MG/40ML
1 VIAL (ML) INTRAVENOUS
Qty: 14 | Refills: 0 | OUTPATIENT
Start: 2017-10-25 | End: 2017-11-01

## 2017-10-25 RX ADMIN — Medication 81 MILLIGRAM(S): at 11:19

## 2017-10-25 RX ADMIN — OXYCODONE HYDROCHLORIDE 5 MILLIGRAM(S): 5 TABLET ORAL at 03:47

## 2017-10-25 RX ADMIN — SPIRONOLACTONE 12.5 MILLIGRAM(S): 25 TABLET, FILM COATED ORAL at 06:30

## 2017-10-25 RX ADMIN — Medication 500 MILLIGRAM(S): at 11:20

## 2017-10-25 RX ADMIN — OXYCODONE HYDROCHLORIDE 5 MILLIGRAM(S): 5 TABLET ORAL at 04:20

## 2017-10-25 RX ADMIN — PANTOPRAZOLE SODIUM 40 MILLIGRAM(S): 20 TABLET, DELAYED RELEASE ORAL at 06:30

## 2017-10-25 RX ADMIN — Medication 50 MILLIGRAM(S): at 06:30

## 2017-10-25 NOTE — PROGRESS NOTE ADULT - SUBJECTIVE AND OBJECTIVE BOX
Chief complaint  Patient is a 62y old  Male who presents with a chief complaint of SOOB (25 Aug 2017 22:27)   Review of systems  Patient in bed, comfortable, no fever,  no hypoglycemia.    Labs and Fingersticks    CAPILLARY BLOOD GLUCOSE    Anion Gap, Serum: 10 (10-25 @ 05:17)  Anion Gap, Serum: 12 (10-24 @ 06:15)      Calcium, Total Serum: 9.2 (10-25 @ 05:17)  Calcium, Total Serum: 9.3 (10-24 @ 06:15)          10-25    133<L>  |  96  |  21  ----------------------------<  98  4.5   |  27  |  0.63    Ca    9.2      25 Oct 2017 05:17                          9.9    14.0  )-----------( 231      ( 25 Oct 2017 05:17 )             29.2     Medications  MEDICATIONS  (STANDING):  aspirin enteric coated 81 milliGRAM(s) Oral daily  ciprofloxacin     Tablet 500 milliGRAM(s) Oral every 12 hours  dextrose 50% Injectable 25 Gram(s) IV Push once  lidocaine   Patch 1 Patch Transdermal daily  lidocaine   Patch 1 Patch Transdermal every 24 hours  lisinopril 7.5 milliGRAM(s) Oral at bedtime  metoprolol succinate ER 50 milliGRAM(s) Oral daily  mirtazapine 7.5 milliGRAM(s) Oral at bedtime  pantoprazole    Tablet 40 milliGRAM(s) Oral before breakfast  polyethylene glycol 3350 17 Gram(s) Oral at bedtime  spironolactone 12.5 milliGRAM(s) Oral daily      Physical Exam  General: Patient comfortable in bed  Vital Signs Last 12 Hrs  T(F): 98.9 (10-25-17 @ 10:00), Max: 99 (10-25-17 @ 06:00)  HR: 101 (10-25-17 @ 10:00) (100 - 102)  BP: --  BP(mean): 70 (10-25-17 @ 10:00) (70 - 72)  RR: 18 (10-25-17 @ 10:00) (18 - 18)  SpO2: 98% (10-25-17 @ 10:00) (98% - 100%)  Neck: No palpable thyroid nodules.  CVS: S1S2, No murmurs  Respiratory: No wheezing, no crepitations  GI: Abdomen soft, bowel sounds positive  Musculoskeletal: Positive edema lower extremities bilaterally  Skin: No skin rashes, no ecchymosis    Diagnostics    Osmolality, Random Urine: Routine  Cancel Reason: Lab Operations Cancel (10-06 @ 10:48)  Osmolality, Serum: Routine (10-06 @ 10:48)  Sodium, Random Urine: Routine (10-06 @ 10:48)

## 2017-10-25 NOTE — PROGRESS NOTE ADULT - PROBLEM/PLAN-3
DISPLAY PLAN FREE TEXT

## 2017-10-25 NOTE — PROGRESS NOTE ADULT - PROBLEM/PLAN-1
DISPLAY PLAN FREE TEXT

## 2017-10-25 NOTE — PROGRESS NOTE ADULT - SUBJECTIVE AND OBJECTIVE BOX
Patient is a 62y old  Male who presents with a chief complaint of SOOB (25 Aug 2017 22:27)  doing ok  for dc today     Any change in ROS:     MEDICATIONS  (STANDING):  aspirin enteric coated 81 milliGRAM(s) Oral daily  ciprofloxacin     Tablet 500 milliGRAM(s) Oral every 12 hours  dextrose 50% Injectable 25 Gram(s) IV Push once  lidocaine   Patch 1 Patch Transdermal daily  lidocaine   Patch 1 Patch Transdermal every 24 hours  lisinopril 7.5 milliGRAM(s) Oral at bedtime  metoprolol succinate ER 50 milliGRAM(s) Oral daily  mirtazapine 7.5 milliGRAM(s) Oral at bedtime  pantoprazole    Tablet 40 milliGRAM(s) Oral before breakfast  polyethylene glycol 3350 17 Gram(s) Oral at bedtime  spironolactone 12.5 milliGRAM(s) Oral daily    MEDICATIONS  (PRN):  dextrose Gel 1 Dose(s) Oral once PRN Blood Glucose LESS THAN 70 milliGRAM(s)/deciLiter  oxyCODONE    IR 5 milliGRAM(s) Oral every 6 hours PRN Severe Pain (7 - 10)    Vital Signs Last 24 Hrs  T(C): 37.2 (25 Oct 2017 10:00), Max: 37.2 (24 Oct 2017 22:00)  T(F): 98.9 (25 Oct 2017 10:00), Max: 99 (25 Oct 2017 06:00)  HR: 101 (25 Oct 2017 10:00) (98 - 104)  BP: --  BP(mean): 70 (25 Oct 2017 10:00) (68 - 74)  RR: 18 (25 Oct 2017 10:00) (18 - 18)  SpO2: 98% (25 Oct 2017 10:00) (98% - 100%)    I&O's Summary    24 Oct 2017 07:  -  25 Oct 2017 07:00  --------------------------------------------------------  IN: 880 mL / OUT: 1625 mL / NET: -745 mL    25 Oct 2017 07:  -  25 Oct 2017 12:03  --------------------------------------------------------  IN: 200 mL / OUT: 150 mL / NET: 50 mL          Physical Exam:   GENERAL: cacehctic  HEENT: ALONSO/   Atraumatic, Normocephalic  ENMT: No tonsillar erythema, exudates, or enlargement; Moist mucous membranes, Good dentition, No lesions  NECK: Supple, No JVD, Normal thyroid  CHEST/LUNG: Clear to auscultaion, ; No rales, rhonchi, wheezing, or rubs  CVS: Regular rate and rhythm; No murmurs, rubs, or gallops  GI: : Soft, Nontender, Nondistended; Bowel sounds present  NERVOUS SYSTEM:  Alert & Oriented X3  EXTREMITIES:  2+ Peripheral Pulses, No clubbing, cyanosis, or edema  LYMPH: No lymphadenopathy noted  SKIN: No rashes or lesions  ENDOCRINOLOGY: No Thyromegaly  PSYCH: Appropriate    Labs:                              9.9    14.0  )-----------( 231      ( 25 Oct 2017 05:17 )             29.2                         10.3   8.6   )-----------( 226      ( 24 Oct 2017 06:15 )             30.3                         10.5   8.6   )-----------( 262      ( 23 Oct 2017 07:06 )             31.5                         10.8   9.9   )-----------( 271      ( 22 Oct 2017 06:36 )             32.0     10-25    133<L>  |  96  |  21  ----------------------------<  98  4.5   |  27  |  0.63  10-24    135  |  97  |  20  ----------------------------<  100<H>  4.4   |  26  |  0.69  10-23    135  |  97  |  22  ----------------------------<  101<H>  4.5   |  26  |  0.73  10-22    134<L>  |  95<L>  |  19  ----------------------------<  94  4.6   |  29  |  0.80    Ca    9.2      25 Oct 2017 05:17  Ca    9.3      24 Oct 2017 06:15    TPro  8.8<H>  /  Alb  3.3  /  TBili  0.3  /  DBili  0.1  /  AST  27  /  ALT  21  /  AlkPhos  94  10-22    CAPILLARY BLOOD GLUCOSE            PT/INR - ( 25 Oct 2017 05:17 )   PT: 19.2 sec;   INR: 1.74 ratio           Urinalysis Basic - ( 24 Oct 2017 22:31 )    Color: Yellow / Appearance: Slightly Turbid / S.025 / pH: x  Gluc: x / Ketone: Negative  / Bili: Negative / Urobili: 1.0 mg/dL   Blood: x / Protein: Trace mg/dL / Nitrite: Positive   Leuk Esterase: Large / RBC: 15 /HPF / WBC >720 /HPF   Sq Epi: x / Non Sq Epi: 0 /HPF / Bacteria: Negative      Cultures:             < from: Xray Chest 1 View AP- PORTABLE-Urgent (17 @ 16:37) >      PROCEDURE DATE:  2017            INTERPRETATION:  AP chest with comparison to 1347 hours from the same day.    IMPRESSION: Left pigtail catheter removed.    IMPRESSION: Left-sided pigtail catheter has been removed. There is no   pneumothorax. The heart is normal in size. The patient is status post   median sternotomy. The lungs are clear.                    TIA BENAVIDES M.D., ATTENDING RADIOLOGIST  This document has been electronically signed. Sep 30 2017  4:18PM        < end of copied text >                  Studies  Chest X-RAY  CT SCAN Chest   Venous Dopplers: LE:   CT Abdomen  Others

## 2017-10-25 NOTE — PROGRESS NOTE ADULT - PROBLEM SELECTOR PLAN 3
S/P LVAD : doing better: care per CTS:  Heart failure team following  10/16: stable  10/18L: stable : card following  10/20: stable : cont current treatment: s/p LVAD: stable  10/21: doing ok  10/22: stable  10/23: stable. awaiting INR to become therapeutic  10/25: s/p LVAD: doing great

## 2017-10-25 NOTE — PROGRESS NOTE ADULT - PROVIDER SPECIALTY LIST ADULT
CCU
CT Surgery
CTU
CTU
Cardiology
Critical Care
Dental
Diabetes
Endocrinology
Gastroenterology
Heart Failure
Infectious Disease
Internal Medicine
Intervent Radiology
Nephrology
Palliative Care
Psychiatry
Pulmonology
CT Surgery
CT Surgery
Gastroenterology
Heart Failure
Pulmonology
Gastroenterology
Gastroenterology
Internal Medicine
Pulmonology
CT Surgery
Gastroenterology
Pulmonology
CCU
Endocrinology
Heart Failure
CT Surgery
Heart Failure
Pulmonology
CT Surgery
CT Surgery
Heart Failure
CT Surgery
Heart Failure
Heart Failure
Palliative Care
CT Surgery
CT Surgery
Heart Failure

## 2017-10-25 NOTE — PROGRESS NOTE ADULT - PROBLEM SELECTOR PLAN 1
resolved  10/18: better: no resp symtpoms  1/20: stable: n o sob , no wheezing:  10/21: resolved  10/22: stable : resolved : no resp distress  10/23: resolved: no resp distress  10/25: stable no resp distress

## 2017-10-25 NOTE — PROGRESS NOTE ADULT - I WAS PHYSICALLY PRESENT FOR THE KEY PORTIONS OF THE EVALUATION AND MANAGEMENT (E/M) SERVICE PROVIDED.  I AGREE WITH THE ABOVE HISTORY, PHYSICAL, AND PLAN WHICH I HAVE REVIEWED AND EDITED WHERE APPROPRIATE
Statement Selected

## 2017-10-25 NOTE — PROGRESS NOTE ADULT - SUBJECTIVE AND OBJECTIVE BOX
Interval Events: no new gi events    HPI:62M with no PMHx here with chest pain. Began last week, described as pleuritic, worsens with cough and deep inspiration. Has been having SOB as well, dry cough. Recently vacationed in [facility] came back today. Saw doctor in [facility] but unclear what workup was. No fever, sick contacts, abd pain. CP described as midsternal, nonradiating. Has 25 pack year smoking history.  CTPA does not reveal PE, Pulm congestion noted.       MEDICATIONS  (STANDING):  aspirin enteric coated 81 milliGRAM(s) Oral daily  dextrose 5%. 1000 milliLiter(s) (50 mL/Hr) IV Continuous <Continuous>  dextrose 50% Injectable 12.5 Gram(s) IV Push once  dextrose 50% Injectable 25 Gram(s) IV Push once  dextrose 50% Injectable 25 Gram(s) IV Push once  docusate sodium 100 milliGRAM(s) Oral three times a day  glycerin Suppository - Adult 1 Suppository(s) Rectal every 4 hours  insulin lispro (HumaLOG) corrective regimen sliding scale   SubCutaneous three times a day before meals  lidocaine   Patch 1 Patch Transdermal daily  lidocaine   Patch 1 Patch Transdermal every 24 hours  lisinopril 5 milliGRAM(s) Oral at bedtime  metoprolol succinate ER 50 milliGRAM(s) Oral daily  mirtazapine 7.5 milliGRAM(s) Oral at bedtime  pantoprazole    Tablet 40 milliGRAM(s) Oral before breakfast  polyethylene glycol 3350 17 Gram(s) Oral at bedtime  senna 3 Tablet(s) Oral at bedtime  spironolactone 12.5 milliGRAM(s) Oral daily    MEDICATIONS  (PRN):  dextrose Gel 1 Dose(s) Oral once PRN Blood Glucose LESS THAN 70 milliGRAM(s)/deciLiter  glucagon  Injectable 1 milliGRAM(s) IntraMuscular once PRN Glucose <70 milliGRAM(s)/deciLiter  naloxone Injectable 0.1 milliGRAM(s) IV Push every 3 minutes PRN Sedation and respiratory depression RR < 11  oxyCODONE    IR 5 milliGRAM(s) Oral every 6 hours PRN Severe Pain (7 - 10)  phenylephrine 0.25% Suppository 1 Suppository(s) Rectal every 8 hours PRN Hemorrhoids      Allergies    No Known Allergies    Intolerances        Review of Systems:    General:  No wt loss, fevers, chills, night sweats,fatigue,   Eyes:  Good vision, no reported pain  ENT:  No sore throat, pain, runny nose, dysphagia  CV:  No pain, palpitations, hypo/hypertension  Resp:  No dyspnea, cough, tachypnea, wheezing  GI:  No pain, No nausea, No vomiting, No diarrhea, No constipation, No weight loss, No fever, No pruritis, No rectal bleeding, No melena, No dysphagia  :  No pain, bleeding, incontinence, nocturia  Muscle:  No pain, weakness  Neuro:  No weakness, tingling, memory problems  Psych:  No fatigue, insomnia, mood problems, depression  Endocrine:  No polyuria, polydypsia, cold/heat intolerance  Heme:  No petechiae, ecchymosis, easy bruisability  Skin:  No rash, tattoos, scars, edema      Vital Signs Last 24 Hrs  T(C): 36.8 (20 Oct 2017 09:53), Max: 37.2 (19 Oct 2017 20:13)  T(F): 98.3 (20 Oct 2017 09:53), Max: 98.9 (19 Oct 2017 20:13)  HR: 96 (20 Oct 2017 09:53) (96 - 106)  BP: --  BP(mean): 78 (20 Oct 2017 09:53) (68 - 80)  RR: 20 (20 Oct 2017 09:53) (20 - 20)  SpO2: 95% (20 Oct 2017 09:53) (95% - 98%)    PHYSICAL EXAM:    Constitutional: NAD, well-developed  HEENT: EOMI, throat clear  Neck: No LAD, supple  Respiratory: CTA and P  Cardiovascular: S1 and S2, RRR, no M  Gastrointestinal: BS+, soft, NT/ND, neg HSM,  Extremities: No peripheral edema, neg clubing, cyanosis  Vascular: 2+ peripheral pulses  Neurological: A/O x 3, no focal deficits  Psychiatric: Normal mood, normal affect  Skin: No rashes          LABS:                        9.5    10.9  )-----------( 297      ( 20 Oct 2017 06:00 )             29.0     10-20    136  |  96  |  16  ----------------------------<  89  4.4   |  30  |  0.71    Ca    9.8      20 Oct 2017 06:00      PT/INR - ( 20 Oct 2017 06:35 )   PT: 18.3 sec;   INR: 1.68 ratio         PTT - ( 20 Oct 2017 06:35 )  PTT:33.6 sec      RADIOLOGY & ADDITIONAL TESTS:

## 2017-10-25 NOTE — PROGRESS NOTE ADULT - PROBLEM/PLAN-2
DISPLAY PLAN FREE TEXT

## 2017-10-25 NOTE — PROGRESS NOTE ADULT - PROBLEM SELECTOR PLAN 2
stable: can start spiriva on DC , pt has not been wheezing  doing ok  10/11: pt has not been wheezing and has been stable breathing wise: BD prn  10/13: the last ct scan chest on 23 of sept showed paraseptal emphysema: and left lower lobe rounded atlectasis: he would need repeat ct scan chest in december to ensure proper retisolution of the atelectasis:  10/16: remains stable: no wheezing: oxygenation is good  10/18: stable  10/20: pt has not been wheezing: can use duoneb: prn for wheezing or sob: has some chronic atelectasis in the left lower lobe  10/21: paraseptal emphysema: ols smoker; not wheezing stable: check for his home oxygen requirement prior to dc  10/22: stable : outpatient PFT upon dc  10/23: stable: off medications  10/25: outpatient follow up

## 2017-10-27 ENCOUNTER — CLINICAL ADVICE (OUTPATIENT)
Age: 62
End: 2017-10-27

## 2017-10-27 LAB
-  AMIKACIN: SIGNIFICANT CHANGE UP
-  AMPICILLIN/SULBACTAM: SIGNIFICANT CHANGE UP
-  AMPICILLIN: SIGNIFICANT CHANGE UP
-  AZTREONAM: SIGNIFICANT CHANGE UP
-  CEFAZOLIN: SIGNIFICANT CHANGE UP
-  CEFEPIME: SIGNIFICANT CHANGE UP
-  CEFOXITIN: SIGNIFICANT CHANGE UP
-  CEFTAZIDIME: SIGNIFICANT CHANGE UP
-  CEFTRIAXONE: SIGNIFICANT CHANGE UP
-  CIPROFLOXACIN: SIGNIFICANT CHANGE UP
-  ERTAPENEM: SIGNIFICANT CHANGE UP
-  GENTAMICIN: SIGNIFICANT CHANGE UP
-  IMIPENEM: SIGNIFICANT CHANGE UP
-  LEVOFLOXACIN: SIGNIFICANT CHANGE UP
-  MEROPENEM: SIGNIFICANT CHANGE UP
-  NITROFURANTOIN: SIGNIFICANT CHANGE UP
-  PIPERACILLIN/TAZOBACTAM: SIGNIFICANT CHANGE UP
-  TOBRAMYCIN: SIGNIFICANT CHANGE UP
-  TRIMETHOPRIM/SULFAMETHOXAZOLE: SIGNIFICANT CHANGE UP
CULTURE RESULTS: SIGNIFICANT CHANGE UP
METHOD TYPE: SIGNIFICANT CHANGE UP
ORGANISM # SPEC MICROSCOPIC CNT: SIGNIFICANT CHANGE UP
ORGANISM # SPEC MICROSCOPIC CNT: SIGNIFICANT CHANGE UP
SPECIMEN SOURCE: SIGNIFICANT CHANGE UP

## 2017-10-28 LAB
CULTURE RESULTS: SIGNIFICANT CHANGE UP
SPECIMEN SOURCE: SIGNIFICANT CHANGE UP

## 2017-11-02 ENCOUNTER — RECORD ABSTRACTING (OUTPATIENT)
Age: 62
End: 2017-11-02

## 2017-11-02 ENCOUNTER — APPOINTMENT (OUTPATIENT)
Dept: CARDIOLOGY | Facility: CLINIC | Age: 62
End: 2017-11-02

## 2017-11-02 ENCOUNTER — OTHER (OUTPATIENT)
Age: 62
End: 2017-11-02

## 2017-11-02 ENCOUNTER — APPOINTMENT (OUTPATIENT)
Dept: CARDIOLOGY | Facility: CLINIC | Age: 62
End: 2017-11-02
Payer: MEDICAID

## 2017-11-02 VITALS
RESPIRATION RATE: 15 BRPM | DIASTOLIC BLOOD PRESSURE: 67 MMHG | WEIGHT: 150 LBS | TEMPERATURE: 98.3 F | SYSTOLIC BLOOD PRESSURE: 93 MMHG

## 2017-11-02 PROCEDURE — 93750 INTERROGATION VAD IN PERSON: CPT

## 2017-11-02 PROCEDURE — 99215 OFFICE O/P EST HI 40 MIN: CPT

## 2017-11-02 RX ORDER — CIPROFLOXACIN HYDROCHLORIDE 500 MG/1
500 TABLET, FILM COATED ORAL TWICE DAILY
Refills: 0 | Status: COMPLETED | COMMUNITY
End: 2017-11-02

## 2017-11-02 RX ORDER — IBUPROFEN 600 MG/1
600 TABLET, FILM COATED ORAL
Qty: 30 | Refills: 0 | Status: DISCONTINUED | COMMUNITY
Start: 2017-07-12

## 2017-11-02 RX ORDER — PHENAZOPYRIDINE 200 MG/1
200 TABLET, FILM COATED ORAL 3 TIMES DAILY
Refills: 0 | Status: COMPLETED | COMMUNITY
End: 2017-10-28

## 2017-11-02 RX ORDER — AMOXICILLIN 500 MG/1
500 CAPSULE ORAL
Qty: 21 | Refills: 0 | Status: DISCONTINUED | COMMUNITY
Start: 2017-07-12

## 2017-11-03 LAB
APPEARANCE: ABNORMAL
BACTERIA: NEGATIVE
BILIRUBIN URINE: NEGATIVE
BLOOD URINE: NEGATIVE
CALCIUM OXALATE CRYSTALS: ABNORMAL
COLOR: ABNORMAL
GLUCOSE QUALITATIVE U: NEGATIVE MG/DL
HYALINE CASTS: 0 /LPF
KETONES URINE: ABNORMAL
LEUKOCYTE ESTERASE URINE: NEGATIVE
MICROSCOPIC-UA: NORMAL
NITRITE URINE: NEGATIVE
PH URINE: 5.5
PROTEIN URINE: ABNORMAL MG/DL
RED BLOOD CELLS URINE: 3 /HPF
SPECIFIC GRAVITY URINE: 1.03
SQUAMOUS EPITHELIAL CELLS: 2 /HPF
URINE COMMENTS: NORMAL
UROBILINOGEN URINE: 1 MG/DL
WHITE BLOOD CELLS URINE: 8 /HPF

## 2017-11-06 LAB — BACTERIA UR CULT: NORMAL

## 2017-11-09 ENCOUNTER — OTHER (OUTPATIENT)
Age: 62
End: 2017-11-09

## 2017-11-09 ENCOUNTER — APPOINTMENT (OUTPATIENT)
Dept: CARDIOLOGY | Facility: CLINIC | Age: 62
End: 2017-11-09
Payer: MEDICAID

## 2017-11-09 VITALS
SYSTOLIC BLOOD PRESSURE: 90 MMHG | OXYGEN SATURATION: 99 % | BODY MASS INDEX: 18.32 KG/M2 | DIASTOLIC BLOOD PRESSURE: 74 MMHG | RESPIRATION RATE: 15 BRPM | HEIGHT: 70 IN | TEMPERATURE: 97.5 F | HEART RATE: 80 BPM | WEIGHT: 128 LBS

## 2017-11-09 VITALS — SYSTOLIC BLOOD PRESSURE: 72 MMHG | DIASTOLIC BLOOD PRESSURE: 60 MMHG

## 2017-11-09 LAB
ALBUMIN SERPL ELPH-MCNC: 3.8 G/DL
ALP BLD-CCNC: 92 U/L
ALT SERPL-CCNC: 31 U/L RC
ANION GAP SERPL CALC-SCNC: 12 MMOL/L
AST SERPL-CCNC: 30 U/L
BASOPHILS # BLD AUTO: 0.01 K/UL
BASOPHILS NFR BLD AUTO: 0 %
BILIRUB SERPL-MCNC: 0.2 MG/DL
BUN SERPL-MCNC: 18 MG/DL
CALCIUM SERPL-MCNC: 9.6 MG/DL
CHLORIDE SERPL-SCNC: 99 MMOL/L
CO2 SERPL-SCNC: 25 MMOL/L
CREAT SERPL-MCNC: 0.75 MG/DL
EOSINOPHIL # BLD AUTO: 0.96 K/UL
EOSINOPHIL NFR BLD AUTO: 8.4 %
GLUCOSE SERPL-MCNC: 98 MG/DL
HCT VFR BLD CALC: 31.9 %
HGB BLD-MCNC: 10.5 G/DL
INR PPP: 2.4 RATIO
LDH SERPL-CCNC: 314 U/L
LYMPHOCYTES # BLD AUTO: 2.2 K/UL
LYMPHOCYTES NFR BLD AUTO: 19.2 %
MAN DIFF?: NO
MCHC RBC-ENTMCNC: 31.4 PG
MCHC RBC-ENTMCNC: 32.9 GM/DL
MCV RBC AUTO: 95.5 FL
MONOCYTES # BLD AUTO: 0.82 K/UL
MONOCYTES NFR BLD AUTO: 7.1 %
NEUTROPHILS # BLD AUTO: 7.49 K/UL
NEUTROPHILS NFR BLD AUTO: 65.2 %
PLATELET # BLD AUTO: 289 K/UL
POTASSIUM SERPL-SCNC: 4.5 MMOL/L
PROT SERPL-MCNC: 8.6 G/DL
PT BLD: 26.4 SEC
RBC # BLD: 3.34 M/UL
RBC # FLD: 14.9 %
SODIUM SERPL-SCNC: 136 MMOL/L
WBC # FLD AUTO: 11.5 K/UL

## 2017-11-09 PROCEDURE — 99215 OFFICE O/P EST HI 40 MIN: CPT | Mod: 25

## 2017-11-09 PROCEDURE — 93750 INTERROGATION VAD IN PERSON: CPT

## 2017-11-09 PROCEDURE — 93000 ELECTROCARDIOGRAM COMPLETE: CPT

## 2017-11-14 ENCOUNTER — RX RENEWAL (OUTPATIENT)
Age: 62
End: 2017-11-14

## 2017-11-18 LAB
CULTURE RESULTS: SIGNIFICANT CHANGE UP
SPECIMEN SOURCE: SIGNIFICANT CHANGE UP

## 2017-11-22 ENCOUNTER — OTHER (OUTPATIENT)
Age: 62
End: 2017-11-22

## 2017-11-22 ENCOUNTER — RESULT REVIEW (OUTPATIENT)
Age: 62
End: 2017-11-22

## 2017-11-30 ENCOUNTER — APPOINTMENT (OUTPATIENT)
Dept: UROLOGY | Facility: CLINIC | Age: 62
End: 2017-11-30
Payer: MEDICAID

## 2017-11-30 ENCOUNTER — APPOINTMENT (OUTPATIENT)
Dept: CARDIOLOGY | Facility: CLINIC | Age: 62
End: 2017-11-30
Payer: MEDICAID

## 2017-11-30 ENCOUNTER — NON-APPOINTMENT (OUTPATIENT)
Age: 62
End: 2017-11-30

## 2017-11-30 VITALS
RESPIRATION RATE: 15 BRPM | SYSTOLIC BLOOD PRESSURE: 78 MMHG | OXYGEN SATURATION: 99 % | WEIGHT: 126 LBS | BODY MASS INDEX: 18.04 KG/M2 | HEART RATE: 88 BPM | TEMPERATURE: 97.8 F | HEIGHT: 70 IN | DIASTOLIC BLOOD PRESSURE: 60 MMHG

## 2017-11-30 DIAGNOSIS — Z78.9 OTHER SPECIFIED HEALTH STATUS: ICD-10-CM

## 2017-11-30 LAB
ALBUMIN SERPL ELPH-MCNC: 4.2 G/DL
ALP BLD-CCNC: 85 U/L
ALT SERPL-CCNC: 26 U/L RC
ANION GAP SERPL CALC-SCNC: 15 MMOL/L
AST SERPL-CCNC: 29 U/L
BASOPHILS # BLD AUTO: 0 K/UL
BASOPHILS NFR BLD AUTO: 0.5 %
BILIRUB SERPL-MCNC: 0.2 MG/DL
BUN SERPL-MCNC: 18 MG/DL
CALCIUM SERPL-MCNC: 9.9 MG/DL
CHLORIDE SERPL-SCNC: 95 MMOL/L
CO2 SERPL-SCNC: 26 MMOL/L
CREAT SERPL-MCNC: 0.76 MG/DL
EOSINOPHIL # BLD AUTO: 0.3 K/UL
EOSINOPHIL NFR BLD AUTO: 3.2 %
GLUCOSE SERPL-MCNC: 87 MG/DL
HCT VFR BLD CALC: 36.8 %
HGB BLD-MCNC: 11.9 G/DL
INR PPP: 1.57 RATIO
LDH SERPL-CCNC: 329 U/L
LYMPHOCYTES # BLD AUTO: 1.9 K/UL
LYMPHOCYTES NFR BLD AUTO: 21.1 %
MAN DIFF?: NO
MCHC RBC-ENTMCNC: 30.9 PG
MCHC RBC-ENTMCNC: 32.4 GM/DL
MCV RBC AUTO: 95.4 FL
MONOCYTES # BLD AUTO: 1 K/UL
MONOCYTES NFR BLD AUTO: 11.4 %
NEUTROPHILS # BLD AUTO: 5.8 K/UL
NEUTROPHILS NFR BLD AUTO: 63.8 %
PLATELET # BLD AUTO: 234 K/UL
POTASSIUM SERPL-SCNC: 4.5 MMOL/L
PROT SERPL-MCNC: 9.2 G/DL
PT BLD: 17.3 SEC
RBC # BLD: 3.86 M/UL
RBC # FLD: 15.6 %
SODIUM SERPL-SCNC: 136 MMOL/L
WBC # FLD AUTO: 9.1 K/UL

## 2017-11-30 PROCEDURE — 93750 INTERROGATION VAD IN PERSON: CPT

## 2017-11-30 PROCEDURE — 99215 OFFICE O/P EST HI 40 MIN: CPT

## 2017-11-30 RX ORDER — LISINOPRIL 2.5 MG/1
2.5 TABLET ORAL
Qty: 270 | Refills: 3 | Status: DISCONTINUED | COMMUNITY
End: 2017-11-30

## 2017-11-30 RX ORDER — METOPROLOL SUCCINATE 50 MG/1
50 TABLET, EXTENDED RELEASE ORAL
Qty: 90 | Refills: 3 | Status: DISCONTINUED | COMMUNITY
End: 2017-11-30

## 2017-11-30 RX ORDER — METOPROLOL SUCCINATE 25 MG/1
25 TABLET, EXTENDED RELEASE ORAL DAILY
Qty: 30 | Refills: 5 | Status: DISCONTINUED | COMMUNITY
Start: 2017-11-09 | End: 2017-11-30

## 2017-12-03 ENCOUNTER — RESULT REVIEW (OUTPATIENT)
Age: 62
End: 2017-12-03

## 2017-12-03 LAB
APPEARANCE: ABNORMAL
BACTERIA UR CULT: ABNORMAL
BACTERIA: ABNORMAL
BILIRUBIN URINE: NEGATIVE
BLOOD URINE: ABNORMAL
COLOR: YELLOW
GLUCOSE QUALITATIVE U: NEGATIVE MG/DL
HYALINE CASTS: 0 /LPF
KETONES URINE: NEGATIVE
LEUKOCYTE ESTERASE URINE: ABNORMAL
MICROSCOPIC-UA: NORMAL
NITRITE URINE: POSITIVE
PH URINE: 6
PROTEIN URINE: 300 MG/DL
PSA SERPL-MCNC: 3.19 NG/ML
RED BLOOD CELLS URINE: 50 /HPF
SPECIFIC GRAVITY URINE: 1.02
SQUAMOUS EPITHELIAL CELLS: 5 /HPF
TESTOST SERPL-MCNC: 157.6 NG/DL
UROBILINOGEN URINE: NEGATIVE MG/DL
WHITE BLOOD CELLS URINE: >720 /HPF

## 2017-12-04 ENCOUNTER — INPATIENT (INPATIENT)
Facility: HOSPITAL | Age: 62
LOS: 3 days | Discharge: HOME | DRG: 871 | End: 2017-12-08
Attending: THORACIC SURGERY (CARDIOTHORACIC VASCULAR SURGERY) | Admitting: THORACIC SURGERY (CARDIOTHORACIC VASCULAR SURGERY)
Payer: MEDICAID

## 2017-12-04 VITALS — TEMPERATURE: 98 F | RESPIRATION RATE: 20 BRPM | HEART RATE: 118 BPM | OXYGEN SATURATION: 99 %

## 2017-12-04 DIAGNOSIS — N17.9 ACUTE KIDNEY FAILURE, UNSPECIFIED: ICD-10-CM

## 2017-12-04 DIAGNOSIS — N39.0 URINARY TRACT INFECTION, SITE NOT SPECIFIED: ICD-10-CM

## 2017-12-04 DIAGNOSIS — E87.1 HYPO-OSMOLALITY AND HYPONATREMIA: ICD-10-CM

## 2017-12-04 DIAGNOSIS — Z98.890 OTHER SPECIFIED POSTPROCEDURAL STATES: Chronic | ICD-10-CM

## 2017-12-04 DIAGNOSIS — A41.9 SEPSIS, UNSPECIFIED ORGANISM: ICD-10-CM

## 2017-12-04 DIAGNOSIS — Z95.811 PRESENCE OF HEART ASSIST DEVICE: Chronic | ICD-10-CM

## 2017-12-04 DIAGNOSIS — Z95.811 PRESENCE OF HEART ASSIST DEVICE: ICD-10-CM

## 2017-12-04 LAB
ALBUMIN SERPL ELPH-MCNC: 3.5 G/DL — SIGNIFICANT CHANGE UP (ref 3.3–5)
ALP SERPL-CCNC: 99 U/L — SIGNIFICANT CHANGE UP (ref 40–120)
ALT FLD-CCNC: 42 U/L RC — SIGNIFICANT CHANGE UP (ref 10–45)
ANION GAP SERPL CALC-SCNC: 14 MMOL/L — SIGNIFICANT CHANGE UP (ref 5–17)
APPEARANCE UR: ABNORMAL
APTT BLD: 30 SEC — SIGNIFICANT CHANGE UP (ref 27.5–37.4)
AST SERPL-CCNC: 57 U/L — HIGH (ref 10–40)
BASOPHILS # BLD AUTO: 0 K/UL — SIGNIFICANT CHANGE UP (ref 0–0.2)
BASOPHILS NFR BLD AUTO: 0 % — SIGNIFICANT CHANGE UP (ref 0–2)
BILIRUB SERPL-MCNC: 0.4 MG/DL — SIGNIFICANT CHANGE UP (ref 0.2–1.2)
BILIRUB UR-MCNC: NEGATIVE — SIGNIFICANT CHANGE UP
BUN SERPL-MCNC: 61 MG/DL — HIGH (ref 7–23)
CALCIUM SERPL-MCNC: 9.5 MG/DL — SIGNIFICANT CHANGE UP (ref 8.4–10.5)
CHLORIDE SERPL-SCNC: 89 MMOL/L — LOW (ref 96–108)
CO2 SERPL-SCNC: 24 MMOL/L — SIGNIFICANT CHANGE UP (ref 22–31)
COLOR SPEC: YELLOW — SIGNIFICANT CHANGE UP
CREAT SERPL-MCNC: 1.82 MG/DL — HIGH (ref 0.5–1.3)
DIFF PNL FLD: ABNORMAL
EOSINOPHIL # BLD AUTO: 0 K/UL — SIGNIFICANT CHANGE UP (ref 0–0.5)
EOSINOPHIL NFR BLD AUTO: 2 % — SIGNIFICANT CHANGE UP (ref 0–6)
GAS PNL BLDV: SIGNIFICANT CHANGE UP
GLUCOSE SERPL-MCNC: 128 MG/DL — HIGH (ref 70–99)
GLUCOSE UR QL: NEGATIVE — SIGNIFICANT CHANGE UP
HCT VFR BLD CALC: 32.9 % — LOW (ref 39–50)
HGB BLD-MCNC: 10.9 G/DL — LOW (ref 13–17)
INR BLD: 1.52 RATIO — HIGH (ref 0.88–1.16)
KETONES UR-MCNC: NEGATIVE — SIGNIFICANT CHANGE UP
LEUKOCYTE ESTERASE UR-ACNC: ABNORMAL
LYMPHOCYTES # BLD AUTO: 0.9 K/UL — LOW (ref 1–3.3)
LYMPHOCYTES # BLD AUTO: 3 % — LOW (ref 13–44)
MCHC RBC-ENTMCNC: 31 PG — SIGNIFICANT CHANGE UP (ref 27–34)
MCHC RBC-ENTMCNC: 33.2 GM/DL — SIGNIFICANT CHANGE UP (ref 32–36)
MCV RBC AUTO: 93.4 FL — SIGNIFICANT CHANGE UP (ref 80–100)
MONOCYTES # BLD AUTO: 1.6 K/UL — HIGH (ref 0–0.9)
MONOCYTES NFR BLD AUTO: 9 % — SIGNIFICANT CHANGE UP (ref 2–14)
NEUTROPHILS # BLD AUTO: 13.7 K/UL — HIGH (ref 1.8–7.4)
NEUTROPHILS NFR BLD AUTO: 82 % — HIGH (ref 43–77)
NITRITE UR-MCNC: POSITIVE
PH UR: 6 — SIGNIFICANT CHANGE UP (ref 5–8)
PLATELET # BLD AUTO: 147 K/UL — LOW (ref 150–400)
POTASSIUM SERPL-MCNC: 5 MMOL/L — SIGNIFICANT CHANGE UP (ref 3.5–5.3)
POTASSIUM SERPL-SCNC: 5 MMOL/L — SIGNIFICANT CHANGE UP (ref 3.5–5.3)
PROT SERPL-MCNC: 8.7 G/DL — HIGH (ref 6–8.3)
PROT UR-MCNC: 100 MG/DL
PROTHROM AB SERPL-ACNC: 16.7 SEC — HIGH (ref 9.8–12.7)
RAPID RVP RESULT: SIGNIFICANT CHANGE UP
RBC # BLD: 3.52 M/UL — LOW (ref 4.2–5.8)
RBC # FLD: 14.7 % — HIGH (ref 10.3–14.5)
SODIUM SERPL-SCNC: 127 MMOL/L — LOW (ref 135–145)
SP GR SPEC: 1.02 — SIGNIFICANT CHANGE UP (ref 1.01–1.02)
UROBILINOGEN FLD QL: NEGATIVE — SIGNIFICANT CHANGE UP
WBC # BLD: 16.2 K/UL — HIGH (ref 3.8–10.5)
WBC # FLD AUTO: 16.2 K/UL — HIGH (ref 3.8–10.5)

## 2017-12-04 PROCEDURE — 93010 ELECTROCARDIOGRAM REPORT: CPT

## 2017-12-04 PROCEDURE — 74176 CT ABD & PELVIS W/O CONTRAST: CPT | Mod: 26

## 2017-12-04 PROCEDURE — 71010: CPT | Mod: 26

## 2017-12-04 PROCEDURE — 99223 1ST HOSP IP/OBS HIGH 75: CPT | Mod: 25,GC

## 2017-12-04 PROCEDURE — 93750 INTERROGATION VAD IN PERSON: CPT

## 2017-12-04 PROCEDURE — 71250 CT THORAX DX C-: CPT | Mod: 26

## 2017-12-04 PROCEDURE — 99222 1ST HOSP IP/OBS MODERATE 55: CPT

## 2017-12-04 PROCEDURE — 99285 EMERGENCY DEPT VISIT HI MDM: CPT | Mod: 25

## 2017-12-04 RX ORDER — VANCOMYCIN HCL 1 G
1000 VIAL (EA) INTRAVENOUS EVERY 24 HOURS
Qty: 0 | Refills: 0 | Status: DISCONTINUED | OUTPATIENT
Start: 2017-12-05 | End: 2017-12-06

## 2017-12-04 RX ORDER — VANCOMYCIN HCL 1 G
1000 VIAL (EA) INTRAVENOUS ONCE
Qty: 0 | Refills: 0 | Status: COMPLETED | OUTPATIENT
Start: 2017-12-04 | End: 2017-12-04

## 2017-12-04 RX ORDER — SODIUM CHLORIDE 9 MG/ML
250 INJECTION INTRAMUSCULAR; INTRAVENOUS; SUBCUTANEOUS ONCE
Qty: 0 | Refills: 0 | Status: COMPLETED | OUTPATIENT
Start: 2017-12-04 | End: 2017-12-04

## 2017-12-04 RX ORDER — VANCOMYCIN HCL 1 G
VIAL (EA) INTRAVENOUS
Qty: 0 | Refills: 0 | Status: DISCONTINUED | OUTPATIENT
Start: 2017-12-04 | End: 2017-12-06

## 2017-12-04 RX ORDER — WARFARIN SODIUM 2.5 MG/1
5 TABLET ORAL ONCE
Qty: 0 | Refills: 0 | Status: COMPLETED | OUTPATIENT
Start: 2017-12-04 | End: 2017-12-04

## 2017-12-04 RX ORDER — CEFTRIAXONE 500 MG/1
1 INJECTION, POWDER, FOR SOLUTION INTRAMUSCULAR; INTRAVENOUS ONCE
Qty: 0 | Refills: 0 | Status: COMPLETED | OUTPATIENT
Start: 2017-12-04 | End: 2017-12-04

## 2017-12-04 RX ORDER — METOPROLOL TARTRATE 50 MG
50 TABLET ORAL DAILY
Qty: 0 | Refills: 0 | Status: DISCONTINUED | OUTPATIENT
Start: 2017-12-04 | End: 2017-12-04

## 2017-12-04 RX ORDER — ACETAMINOPHEN 500 MG
650 TABLET ORAL EVERY 6 HOURS
Qty: 0 | Refills: 0 | Status: DISCONTINUED | OUTPATIENT
Start: 2017-12-04 | End: 2017-12-08

## 2017-12-04 RX ORDER — SPIRONOLACTONE 25 MG/1
12.5 TABLET, FILM COATED ORAL DAILY
Qty: 0 | Refills: 0 | Status: DISCONTINUED | OUTPATIENT
Start: 2017-12-04 | End: 2017-12-08

## 2017-12-04 RX ORDER — SODIUM CHLORIDE 9 MG/ML
500 INJECTION INTRAMUSCULAR; INTRAVENOUS; SUBCUTANEOUS ONCE
Qty: 0 | Refills: 0 | Status: COMPLETED | OUTPATIENT
Start: 2017-12-04 | End: 2017-12-04

## 2017-12-04 RX ORDER — FAMOTIDINE 10 MG/ML
20 INJECTION INTRAVENOUS ONCE
Qty: 0 | Refills: 0 | Status: COMPLETED | OUTPATIENT
Start: 2017-12-04 | End: 2017-12-04

## 2017-12-04 RX ORDER — ACETAMINOPHEN 500 MG
1000 TABLET ORAL ONCE
Qty: 0 | Refills: 0 | Status: COMPLETED | OUTPATIENT
Start: 2017-12-04 | End: 2017-12-04

## 2017-12-04 RX ORDER — FINASTERIDE 5 MG/1
5 TABLET, FILM COATED ORAL DAILY
Qty: 0 | Refills: 0 | Status: DISCONTINUED | OUTPATIENT
Start: 2017-12-04 | End: 2017-12-08

## 2017-12-04 RX ORDER — LISINOPRIL 2.5 MG/1
7.5 TABLET ORAL AT BEDTIME
Qty: 0 | Refills: 0 | Status: DISCONTINUED | OUTPATIENT
Start: 2017-12-04 | End: 2017-12-05

## 2017-12-04 RX ORDER — PHENAZOPYRIDINE HCL 100 MG
200 TABLET ORAL
Qty: 0 | Refills: 0 | Status: DISCONTINUED | OUTPATIENT
Start: 2017-12-04 | End: 2017-12-04

## 2017-12-04 RX ORDER — MIRTAZAPINE 45 MG/1
7.5 TABLET, ORALLY DISINTEGRATING ORAL AT BEDTIME
Qty: 0 | Refills: 0 | Status: DISCONTINUED | OUTPATIENT
Start: 2017-12-04 | End: 2017-12-08

## 2017-12-04 RX ORDER — LISINOPRIL 2.5 MG/1
2.5 TABLET ORAL DAILY
Qty: 0 | Refills: 0 | Status: DISCONTINUED | OUTPATIENT
Start: 2017-12-04 | End: 2017-12-04

## 2017-12-04 RX ORDER — PANTOPRAZOLE SODIUM 20 MG/1
40 TABLET, DELAYED RELEASE ORAL
Qty: 0 | Refills: 0 | Status: DISCONTINUED | OUTPATIENT
Start: 2017-12-04 | End: 2017-12-08

## 2017-12-04 RX ORDER — ASPIRIN/CALCIUM CARB/MAGNESIUM 324 MG
81 TABLET ORAL DAILY
Qty: 0 | Refills: 0 | Status: DISCONTINUED | OUTPATIENT
Start: 2017-12-04 | End: 2017-12-08

## 2017-12-04 RX ORDER — CEFTRIAXONE 500 MG/1
1 INJECTION, POWDER, FOR SOLUTION INTRAMUSCULAR; INTRAVENOUS EVERY 24 HOURS
Qty: 0 | Refills: 0 | Status: DISCONTINUED | OUTPATIENT
Start: 2017-12-04 | End: 2017-12-08

## 2017-12-04 RX ORDER — METOPROLOL TARTRATE 50 MG
75 TABLET ORAL DAILY
Qty: 0 | Refills: 0 | Status: DISCONTINUED | OUTPATIENT
Start: 2017-12-04 | End: 2017-12-08

## 2017-12-04 RX ORDER — SODIUM CHLORIDE 9 MG/ML
3 INJECTION INTRAMUSCULAR; INTRAVENOUS; SUBCUTANEOUS EVERY 8 HOURS
Qty: 0 | Refills: 0 | Status: DISCONTINUED | OUTPATIENT
Start: 2017-12-04 | End: 2017-12-08

## 2017-12-04 RX ORDER — POLYETHYLENE GLYCOL 3350 17 G/17G
17 POWDER, FOR SOLUTION ORAL AT BEDTIME
Qty: 0 | Refills: 0 | Status: DISCONTINUED | OUTPATIENT
Start: 2017-12-04 | End: 2017-12-04

## 2017-12-04 RX ORDER — POLYETHYLENE GLYCOL 3350 17 G/17G
17 POWDER, FOR SOLUTION ORAL
Qty: 0 | Refills: 0 | Status: DISCONTINUED | OUTPATIENT
Start: 2017-12-04 | End: 2017-12-08

## 2017-12-04 RX ORDER — LIDOCAINE 4 G/100G
1 CREAM TOPICAL EVERY 24 HOURS
Qty: 0 | Refills: 0 | Status: DISCONTINUED | OUTPATIENT
Start: 2017-12-04 | End: 2017-12-08

## 2017-12-04 RX ADMIN — FINASTERIDE 5 MILLIGRAM(S): 5 TABLET, FILM COATED ORAL at 18:11

## 2017-12-04 RX ADMIN — WARFARIN SODIUM 5 MILLIGRAM(S): 2.5 TABLET ORAL at 21:33

## 2017-12-04 RX ADMIN — Medication 650 MILLIGRAM(S): at 21:33

## 2017-12-04 RX ADMIN — SODIUM CHLORIDE 3 MILLILITER(S): 9 INJECTION INTRAMUSCULAR; INTRAVENOUS; SUBCUTANEOUS at 21:34

## 2017-12-04 RX ADMIN — SPIRONOLACTONE 12.5 MILLIGRAM(S): 25 TABLET, FILM COATED ORAL at 18:11

## 2017-12-04 RX ADMIN — SODIUM CHLORIDE 1000 MILLILITER(S): 9 INJECTION INTRAMUSCULAR; INTRAVENOUS; SUBCUTANEOUS at 18:12

## 2017-12-04 RX ADMIN — LISINOPRIL 7.5 MILLIGRAM(S): 2.5 TABLET ORAL at 21:33

## 2017-12-04 RX ADMIN — Medication 250 MILLIGRAM(S): at 18:12

## 2017-12-04 RX ADMIN — Medication 400 MILLIGRAM(S): at 12:25

## 2017-12-04 RX ADMIN — MIRTAZAPINE 7.5 MILLIGRAM(S): 45 TABLET, ORALLY DISINTEGRATING ORAL at 21:33

## 2017-12-04 RX ADMIN — Medication 81 MILLIGRAM(S): at 18:11

## 2017-12-04 RX ADMIN — Medication 75 MILLIGRAM(S): at 18:11

## 2017-12-04 RX ADMIN — SODIUM CHLORIDE 500 MILLILITER(S): 9 INJECTION INTRAMUSCULAR; INTRAVENOUS; SUBCUTANEOUS at 12:28

## 2017-12-04 RX ADMIN — CEFTRIAXONE 100 GRAM(S): 500 INJECTION, POWDER, FOR SOLUTION INTRAMUSCULAR; INTRAVENOUS at 12:28

## 2017-12-04 NOTE — CONSULT NOTE ADULT - ASSESSMENT
62M 62M hx of systolic heart failure s/p TV annuloplasty, HeartMate II LVAD placed 9/12/2017 with several days of poor PO, found to have an inflammatory UA, leukocytosis, febrile with RASHAWN. His lactate is wnl.

## 2017-12-04 NOTE — H&P ADULT - NSHPREVIEWOFSYSTEMS_GEN_ALL_CORE
Review of Systems  GENERAL:  Fevers[] chills[] sweats[] fatigue[] weight loss[] weight gain []                                        NEURO:  parathesias[] seizures []  syncope []  confusion []                                                                                  EYES: glasses[]  blurry vision[]  discharge[] pain[] glaucoma []                                                                            ENMT:  difficulty hearing []  vertigo[]  dysphagia[] epistaxis[] recent dental work []                                      CV:  chest pain[] palpitations[] PEÑA [] diaphoresis [] edema[]                                                                                             RESPIRATORY:  wheezing[] SOB[] cough [] sputum[] hemoptysis[]                                                                    GI:  nausea[]  vomiting []  diarrhea[] constipation [] melena []                                                                        : hematuria[ ]  dysuria[ ] urgency[] incontinence[]                                                                                              MUSKULOSKELETAL:  arthritis[ ]  joint swelling [ ] muscle weakness [ ]                                                                  SKIN/BREAST:  rash[ ] itching [ ]  hair loss[ ] masses[ ]                                                                                                PSYCH:  dementia [ ] depresion [ ] anxiety[ ]                                                                                                                  HEME/LYMPH:  bruises easily[ ] enlarged lymph nodes[ ] tender lymph nodes[ ]                                                 ENDOCRINE:  cold intolerance[ ] heat intolerance[ ] polydipsia[ ] Review of Systems  GENERAL:  Fevers[X] chills[] sweats[] fatigue[X] weight loss[X] weight gain []                                        NEURO:  parathesias[] seizures []  syncope []  confusion []                                                                                  EYES: glasses[]  blurry vision[]  discharge[] pain[] glaucoma []                                                                            ENMT:  difficulty hearing []  vertigo[]  dysphagia[] epistaxis[] recent dental work []                                      CV:  chest pain[X] palpitations[] PEÑA [x] diaphoresis [] edema[]                                                                                             RESPIRATORY:  wheezing[] SOB[X] cough [] sputum[] hemoptysis[]                                                                    GI:  nausea[X]  vomiting []  diarrhea[] constipation [] melena []                                                                        : hematuria[ ]  dysuria[ ] urgency[] incontinence[]                                                                                              MUSCULOSKELETAL:  arthritis[ ]  joint swelling [ ] muscle weakness [ ]                                                                  SKIN/BREAST:  rash[ ] itching [ ]  hair loss[ ] masses[ ]                                                                                                PSYCH:  dementia [ ] depression [ ] anxiety[ ]                                                                                                                  HEME/LYMPH:  bruises easily[ ] enlarged lymph nodes[ ] tender lymph nodes[ ]                                                 ENDOCRINE:  cold intolerance[ ] heat intolerance[ ] polydipsia[ ]

## 2017-12-04 NOTE — CONSULT NOTE ADULT - PROBLEM SELECTOR RECOMMENDATION 4
-would check serum osm, urine sodium, urine osm  -spec grav on UA 1.016 - with elevated JVP however given pt is relatively stable, would follow-up studies to determine etiology of patient's electrolyte derangements -continue asa 81mg daily  -cont coumadin, goal INR (2-2.5)  -check LDH  -will interrogate LVAD for any events

## 2017-12-04 NOTE — H&P ADULT - NSHPLABSRESULTS_GEN_ALL_CORE
LABS:                        10.9   16.2  )-----------( 147      ( 04 Dec 2017 10:59 )             32.9     12-04    127<L>  |  89<L>  |  61<H>  ----------------------------<  128<H>  5.0   |  24  |  1.82<H>    Ca    9.5      04 Dec 2017 10:59    TPro  8.7<H>  /  Alb  3.5  /  TBili  0.4  /  DBili  x   /  AST  57<H>  /  ALT  42  /  AlkPhos  99  12-04    PT/INR - ( 04 Dec 2017 10:59 )   PT: 16.7 sec;   INR: 1.52 ratio         PTT - ( 04 Dec 2017 10:59 )  PTT:30.0 sec  Urinalysis Basic - ( 04 Dec 2017 12:41 )    Color: Yellow / Appearance: SL Turbid / S.016 / pH: x  Gluc: x / Ketone: Negative  / Bili: Negative / Urobili: Negative   Blood: x / Protein: 100 mg/dL / Nitrite: Positive   Leuk Esterase: Large / RBC: 2-5 /HPF / WBC >50 /HPF   Sq Epi: x / Non Sq Epi: x / Bacteria: Many /HPF

## 2017-12-04 NOTE — ED PROVIDER NOTE - ATTENDING CONTRIBUTION TO CARE
I have seen and evaluated this patient with the resident.   I agree with the findings  unless other wise stated.  I have made appropriate changes in documentations where needed, After my face to face bedside evaluation, I am further  notinM w/PMH CHF w/LVAD, CKD3, prior UTI, smoking, SIADH p/w fatigue, fever  BPH has urinary frequency chills - concern for urosepsis.peripheral pulse weak but pt alert  Patient's PI is 5.6 so will provide 250ml but can not provide 30ml/kg 2/2 CHF. Tamanna of LVAD team and CT surgery fellow aware and patient will be admitted to [address] [place] [address] under Dr. Janusz Cadet. Will r/o viral infx, PNA, ACS, arrhythmia. Will obtain labs, urine studies, cultures, CXR, EKG, tele  --Garcia.

## 2017-12-04 NOTE — CONSULT NOTE ADULT - PROBLEM SELECTOR RECOMMENDATION 9
-continue asa 81mg daily  -cont coumadin  -check LDH  -will interrogate LVAD for any events -CT abd negative for acute intraabdominal pathology  -likely source is urine  -f/u UCx, BCx  -continue empiric ceftriaxone. pt with hx of E. coli bacteriuria sensitive to ceftriaxone  -RUQ ttp ?related to urinary complaints vs. possible hepatic congestion vs. GI due to poor PO. consider ultrasound

## 2017-12-04 NOTE — H&P ADULT - PMH
No pertinent past medical history CAD (coronary artery disease)    CHF (congestive heart failure)    Depression    Pleural effusion

## 2017-12-04 NOTE — CONSULT NOTE ADULT - PROBLEM SELECTOR RECOMMENDATION 3
-unclear if related to UTI, poor PO, or obstruction, though noted pt had spontaneous micturition while hospitalized  -trend Cr  -workup as below -would check serum osm, urine sodium, urine osm  -spec grav on UA 1.016 - with elevated JVP however given pt is relatively stable, would follow-up studies to determine etiology of patient's electrolyte derangements

## 2017-12-04 NOTE — CONSULT NOTE ADULT - SUBJECTIVE AND OBJECTIVE BOX
ID CONSULTATION-- Morris Prater [phone]    Patient is a 62y old  Male who presents with a chief complaint of HISTORY OF PRESENT ILLNESS:  62M systolic heart failure s/p TV annuloplasty, HeartMate II LVAD placed 9/12/2017 with postoperative course c/b L hemothorax who presents from home with three days of fatigue, decreased UOP, and poor PO/anorexia and hiccups. He is currently unable to care for himself. Family at bedside reports he recently saw a urologist the week prior and was prescribed a "natural remedY" but unknown what type. Since then he has not felt well. No diarrhea, nausea, or vomiting. Increased PEÑA. Denies any LE swelling.    In the ED found to have leukocytosis and febrile to T102.4F. Labs significant for RASHAWN and hyponatremia.    He was given 1 gram of Ceftriaxone in the ED    Allergies  No Known Allergies    HPI:      PAST MEDICAL & SURGICAL HISTORY:  LVAD placed in 9/17 with course complicated by a hemothorax      SOCIAL:    FAMILY HISTORY:  No pertinent family history in first degree relatives    REVIEW OF SYSTEMS  General:	Complains of malaise fatigue +- chills. febrile in ED    Skin:No rash  	  Ophthalmologic:Denies any visual complaints,discharge redness or photophobia  	  ENMT:No nasal discharge,headache,sinus congestion or throat pain.No dental complaints    Respiratory and Thorax:No cough,sputum or chest pain.Denies shortness of breath  	  Cardiovascular:	No chest pain,palpitaions or dizziness    Gastrointestinal:	NO nausea, has vague abdominal pain no diarrhea. Pain in RLQ and LLQ    Genitourinary:	c/o dysuria,frequency. No flank pain    Musculoskeletal:	No joint swelling or pain. c/o weakness    Neurological:No confusion,diziness.No extremity weakness. c/o urine incontinence	    Psychiatric:No delusions or hallucinations	    Hematology/Lymphatics:	No LN swelling.No gum bleeding     Endocrine:	No recent weight gain or loss.No abnormal heat/cold intolerance    Allergic/Immunologic:	No hives or rash   Allergies    No Known Allergies  to any anti-infectives        ANTIMICROBIALS:        Vital Signs Last 24 Hrs  T(C): 36.5 (04 Dec 2017 16:00), Max: 39.1 (04 Dec 2017 10:54)  T(F): 97.7 (04 Dec 2017 16:00), Max: 102.4 (04 Dec 2017 10:54)  HR: 96 (04 Dec 2017 16:00) (96 - 118)  BP: --  BP(mean): 78 (04 Dec 2017 16:00) (72 - 78)  RR: 20 (04 Dec 2017 16:00) (20 - 22)  SpO2: 99% (04 Dec 2017 16:00) (96% - 100%)    PHYSICAL EXAM:Pleasant patient in no acute distress.      Constitutional:Comfortable.Awake and alert but fatigued  No cachexia but thin and frail    Eyes:PERRL EOMI.NO discharge or conjunctival injection    ENMT:No sinus tenderness.No thrush.No pharyngeal exudate or erythema.Fair dental hygiene    Neck:Supple,No LN,no JVD      Respiratory:Good air entry bilaterally,CTA    Cardiovascular:  LVAd sounds  sternal wound healed no erythema  Gastrointestinal:Soft BS(+) no tenderness no masses ,No rebound or guarding  LVAD exit site dressing dry and intact  tender at LLQ and RLQ down to groin area    Genitourinary:No CVA tendereness     Rectal:    Extremities:No cyanosis,clubbing or edema.    Vascular:peripheral pulses felt    Neurological:AAO X 3,No grossly focal deficits    Skin:No rash     Lymph Nodes:No palpable LNs    Musculoskeletal:No joint swelling or LOM    Psychiatric:Affect normal.                              10.9   16.2  )-----------( 147      ( 04 Dec 2017 10:59 )             32.9       12-04    127<L>  |  89<L>  |  61<H>  ----------------------------<  128<H>  5.0   |  24  |  1.82<H>    Ca    9.5      04 Dec 2017 10:59    TPro  8.7<H>  /  Alb  3.5  /  TBili  0.4  /  DBili  x   /  AST  57<H>  /  ALT  42  /  AlkPhos  99  12-04      RECENT CULTURES:      RADIOLOGY:  < from: CT Abdomen and Pelvis No Cont (12.04.17 @ 12:34) >  IMPRESSION:     No pleural or pericardial effusions.    Redemonstration of paraseptal emphysema.    Several linear radiopaque densities in the descending and sigmoid colon   which may be related to ingested material.      < end of copied text >  < from: Xray Chest 1 View AP- PORTABLE-Urgent (12.04.17 @ 11:07) >  Impression: Clear lungs. Elevated right hemidiaphragm.    < end of copied text >      IMPRESSION:    Rx:

## 2017-12-04 NOTE — H&P ADULT - PROBLEM SELECTOR PLAN 1
Continue with current medication regimen  AC therapy    cc x 1 IVB  Admit to [facility] floor   Blood cultures x 1   Urin culture   Tylenol 650 mg PO every 6 hours as needed for fever > 38.5  CHF consult called and following   Plan of care Discussed with attending   D/C Plan home once medically cleared

## 2017-12-04 NOTE — ED PROVIDER NOTE - MEDICAL DECISION MAKING DETAILS
Naty: 62M w/PMH CHF w/LVAD, CKD3, prior UTI, smoking, SIADH p/w fatigue, fever - concern for urosepsis. Patient's PI is 5.6 so will provide 250ml but can not provide 30ml/kg 2/2 CHF. Tamanna of LVAD team and CT surgery fellow aware and patient will be admitted to [address] [place] [address] under Dr. Janusz Cadet. Will r/o viral infx, PNA, ACS, arrhythmia. Will obtain labs, urine studies, cultures, CXR, EKG, tele.

## 2017-12-04 NOTE — H&P ADULT - NSHPPHYSICALEXAM_GEN_ALL_CORE
PHYSICAL EXAM  Vital Signs Last 24 Hrs  T(C): 36.5 (04 Dec 2017 16:00), Max: 39.1 (04 Dec 2017 10:54)  T(F): 97.7 (04 Dec 2017 16:00), Max: 102.4 (04 Dec 2017 10:54)  HR: 96 (04 Dec 2017 16:00) (96 - 118)  BP: --  BP(mean): 78 (04 Dec 2017 16:00) (72 - 78)  RR: 20 (04 Dec 2017 16:00) (20 - 22)  SpO2: 99% (04 Dec 2017 16:00) (96% - 100%)    General: WN/WD NAD  Neurology: A&Ox3, nonfocal, BLANDON x 4  Head:  Normocephalic, atraumatic  ENT:  Mucosa moist, no ulcerations  Neck:  Supple, no sinuses or palpable masses  Lymphatic:  No palpable cervical, supraclavicular, axillary or inguinal adenopathy  Respiratory: CTA B/L  CV: RRR, S1S2, no murmur  Abdominal: Soft, NT, ND no palpable mass; RLQ drive line site with occlusive dressing C/D/I   MSK: No edema, + peripheral pulses, FROM all 4 extremity  Incisions: intact, no erythema or drainage

## 2017-12-04 NOTE — ED PROVIDER NOTE - NS ED ROS FT
+fever, no chills, no change in vision, no throat pain, no chest pain, no joint pain, no rashes, no focal neurologic complaints,  all ROS otherwise as per HPI or negative.

## 2017-12-04 NOTE — H&P ADULT - PROBLEM SELECTOR PLAN 2
ID consult called and appreciated ID consult called and appreciated  Vanco 1 G IV BID as per ID   Ceftriaxone IV as per ID ID consult called and appreciated  Vanco 1 G IV Q 24 as per ID   Ceftriaxone 1G IV Q24 as per ID

## 2017-12-04 NOTE — H&P ADULT - HISTORY OF PRESENT ILLNESS
History of Present Illness:    62y Male History of Present Illness:    62M w/PMH CHF s/p LVAD 09/2017 on coumadin c/b UTI (off abx >2w), SIADH, CKD3, smoking who presented to [facility] ER with c/o fatigue x 3-4 days.  Reports associated symptom of hiccups, dysuria, decreased  urine output, sob, cp, and decreased PO intake 2/2 abd pain when eating. Denies URI sx. Denies cough, n/v/d/c, melena / BRBPR, syncope, LE edema, AMS. Went to urologists on Thursday where prescribed a "natural medication" - patient denies being on antibiotics.  Patient was found to have (+) UA and admitted for urosepsis.

## 2017-12-04 NOTE — H&P ADULT - NSHPSOCIALHISTORY_GEN_ALL_CORE
SOCIAL HISTORY:  Smoker: [X] Yes  [ ] No        PACK YEARS:                         WHEN QUIT?  ETOH use: [ ] Yes  [ X] No              FREQUENCY / QUANTITY:  Ilicit Drug use:  [ ] Yes  [X ] No  Occupation: Disabled   Live with: adult children

## 2017-12-04 NOTE — ED PROVIDER NOTE - CARE PLAN
Principal Discharge DX:	Sepsis, due to unspecified organism  Secondary Diagnosis:	Dysuria  Secondary Diagnosis:	Hiccups

## 2017-12-04 NOTE — ED PROVIDER NOTE - SKIN, MLM
Skin normal color for race, warm, dry and intact. No evidence of rash. Skin normal color for race, warm, dry and intact. No evidence of rash. LVAD insrtion site no redness no d/c

## 2017-12-04 NOTE — ED PROVIDER NOTE - OBJECTIVE STATEMENT
62M w/PMH CHF s/p LVAD 09/2017 on coumadin c/b UTI (off abx >2w), SIADH, CKD3, smoking p/w fatigue x a few days. +hiccups, newly  febrile in ED (denies f/c at home), +dysuria, dec UO, sob, cp, dec PO 2/2 abd pain when eating. Denies URI sx. Denies cough, n/v/d/c, melena/BRBPR, syncope, LE edema, AMS. Went to urologists on Thursday where prescribed a "natural medication" - patient denies being on antibiotics.

## 2017-12-04 NOTE — CONSULT NOTE ADULT - ATTENDING COMMENTS
63 yo man with a history of LVAD placement in 9/2017. In October 2107 patient was treated for an E.coli UTI relatively sensitive strain. Patient being admitted with fevers to 102f and dysuria, incontinence with recent out patient visit to the Urologist.    Urine with many WBCs and bacteria  Urine culture in 10/24/17 grew E.coli sens. to Ceftriaxone  CT scan of abdomen without contrast did not show evidence of stones or pyelonephritis or prostate enlargement    Would:  1. send blood cultures x 2 sets- done  2. send urine culture- done  3. Continue the Ceftriaxone 1 gram/24hours  4. Add Vancomycin 1 gram /24 hours pending cultures  5. obtain ultrasound of the renal collecting system to further evaluate for pyelonephritis, stones, prostate      I can be reached at [phone]
Mr. Quispe presents with acute renal failure and hyponatremia in context of febrile illness, likely related to UTI with E. coli, that led to three days of poor oral intake. We have consulted ID and will start him on antibiotics. We will give volume as he appears dehydrated. He is hemodynamically stable without evidence of LVAD malfunction.

## 2017-12-04 NOTE — CONSULT NOTE ADULT - PROBLEM SELECTOR RECOMMENDATION 2
-CT abd negative for acute intraabdominal pathology  -likely source is urine  -f/u UCx, BCx  -continue empiric ceftriaxone. pt with hx of E. coli bacteriuria sensitive to ceftriaxone  -RUQ ttp ?related to urinary complaints vs. possible hepatic congestion vs. GI due to poor PO. consider ultrasound -unclear if related to UTI, poor PO, or obstruction, though noted pt had spontaneous micturition while hospitalized  -trend Cr  -workup as below

## 2017-12-04 NOTE — CONSULT NOTE ADULT - SUBJECTIVE AND OBJECTIVE BOX
Heart Failure Initial Consult    CHIEF COMPLAINT:  Poor PO intake and decreased UOP, abd pain      HISTORY OF PRESENT ILLNESS:  62M systolic heart failure s/p TV annuloplasty, HeartMate II LVAD placed 9/12/2017 with postoperative course c/b L hemothorax who presents from home with three days of fatigue, decreased UOP, and poor PO/anorexia and hiccups. He is currently unable to care for himself. Family at bedside reports he recently saw a urologist the week prior and was prescribed a "natural remedY" but unknown what type. Since then he has not felt well. No diarrhea, nausea, or vomiting. Increased PEÑA. Denies any LE swelling.    In the ED found to have leukocytosis and febrile to T102.4F. Labs significant for RASHAWN and hyponatremia.    Allergies  No Known Allergies  	  MEDICATIONS:  cefTRIAXone   IVPB 1 Gram(s) IV Intermittent Once  acetaminophen  IVPB 1000 milliGRAM(s) IV Intermittent once  famotidine Injectable 20 milliGRAM(s) IV Push once  sodium chloride 0.9% Bolus 250 milliLiter(s) IV Bolus once    PAST MEDICAL & SURGICAL HISTORY:  No pertinent past medical history  No significant past surgical history    FAMILY HISTORY:  No pertinent family history in first degree relatives    SOCIAL HISTORY:    [ ] Non-smoker  [x ] Smoker  [ ] Alcohol    Review of Systems:  Constitutional: [ x] Fever [x ] Chills [ x] Fatigue [ ] Weight change   HEENT: [ ] Blurred vision [ ] Eye Pain [ -] Headache [ ] Runny nose [ ] Sore Throat   Respiratory: [ ] Cough [ ] Wheezing [x] Shortness of breath  Cardiovascular: [ ] Chest Pain [ ] Palpitations [x ] PEÑA [ ] PND [ ] Orthopnea  Gastrointestinal: [x ] Abdominal Pain [ ] Diarrhea [ ] Constipation [ ] Hemorrhoids [ -] Nausea [- ] Vomiting  Genitourinary: [ ] Nocturia [x ] Dysuria [ ] Incontinence  Extremities: [ -] Swelling [ ] Joint Pain  Neurologic: [ ] Focal deficit [ ] Paresthesias [- ] Syncope  Skin: [- ] Rash [ ] Ecchymoses [ ] Wounds [ ] Lesions  Psychiatry: [ ] Depression [ ] Suicidal/Homicidal Ideation [- ] Anxiety [ ] Sleep Disturbances  [ x] 10 point review of systems is otherwise negative except as mentioned above            [ ]Unable to obtain    PHYSICAL EXAM:  T(C): 39.1 (12-04-17 @ 10:54), Max: 39.1 (12-04-17 @ 10:54)  HR: 110 (12-04-17 @ 10:54) (110 - 118)  RR: 22 (12-04-17 @ 10:54) (20 - 22)  SpO2: 100% (12-04-17 @ 10:54) (99% - 100%)  I&O's Summary    HeartMate II  Speed 9000rpm  Flow 5.4L  PI 5.2  Power 5.3    Appearance: chronically ill appearing, no acute distress  HEENT:   dry mmm	  Cardiovascular: s1s2 present, typical LVAD hum present. elevated JVP present  Respiratory: Lungs clear to auscultation anteriorly, mildly coarse breath sounds at the bases	  Psychiatry: A & O x 3, Mood & affect appropriate  Gastrointestinal:  soft, driveline intact. mild ttp without rebound present surrounding driveline without any exudates. RUQ ttp  Skin: No cyanosis	  Neurologic: grossly non-focal  Extremities: Normal range of motion, No clubbing, cyanosis or  Vascular: Peripheral pulses palpable 2+ bilaterally      LABS:	 	  CBC Full  -  ( 04 Dec 2017 10:59 )  WBC Count : 16.2 K/uL  Hemoglobin : 10.9 g/dL  Hematocrit : 32.9 %  Platelet Count - Automated : 147 K/uL  Auto Neutrophil % : 82.0 %  Auto Lymphocyte % : 3.0 %  Auto Monocyte % : 9.0 %  Auto Eosinophil % : 2.0 %  Auto Basophil % : 0.0 %    12-04  127<L>  |  89<L>  |  61<H>  ----------------------------<  128<H>  5.0   |  24  |  1.82<H>    Ca    9.5      04 Dec 2017 10:59  TPro  8.7<H>  /  Alb  3.5  /  TBili  0.4  /  DBili  x   /  AST  57<H>  /  ALT  42  /  AlkPhos  99  12-04    HgA1c: Hemoglobin A1C, Whole Blood: 6.2 % (09-01 @ 00:08)    TSH:     CARDIAC MARKERS:    TELEMETRY: 	sinus tachycardia    ECG:  	  RADIOLOGY:  OTHER: 	    PREVIOUS DIAGNOSTIC TESTING:    [x] Echocardiogram: < from: Limited Transthoracic Echo (09.13.17 @ 11:16) >  Conclusions:  1. Left ventricular enlargement.  2. Severe global left ventricular systolic dysfunction.  LVAD cannula within the LV apex.  The interventriular  septum appears midline. Paradoxical septal motion.  3. Right ventricular enlargement with decreased right  ventricular systolic function.  4. Normal tricuspid valve.  5. Bilateral pleural effusions.  *** Compared with echocardiogram of 9/12/2017, no  significant changes noted. Heart Failure Initial Consult    CHIEF COMPLAINT:  Poor PO intake and decreased UOP, abd pain    HISTORY OF PRESENT ILLNESS:  62M systolic heart failure s/p TV annuloplasty, HeartMate II LVAD placed 9/12/2017 with postoperative course c/b L hemothorax who presents from home with three days of fatigue, decreased UOP, and poor PO/anorexia and hiccups. He is currently unable to care for himself. Family at bedside reports he recently saw a urologist the week prior and was prescribed a "natural remedY" but unknown what type. Since then he has not felt well. No diarrhea, nausea, or vomiting. Increased PEÑA. Denies any LE swelling.    In the ED found to have leukocytosis and febrile to T102.4F. Labs significant for RASHAWN and hyponatremia.    Allergies  No Known Allergies  	  MEDICATIONS:  cefTRIAXone   IVPB 1 Gram(s) IV Intermittent Once  acetaminophen  IVPB 1000 milliGRAM(s) IV Intermittent once  famotidine Injectable 20 milliGRAM(s) IV Push once  sodium chloride 0.9% Bolus 250 milliLiter(s) IV Bolus once    PAST MEDICAL & SURGICAL HISTORY:  No pertinent past medical history  No significant past surgical history    FAMILY HISTORY:  No pertinent family history in first degree relatives    SOCIAL HISTORY:    [ ] Non-smoker  [x ] Smoker  [ ] Alcohol    Review of Systems:  Constitutional: [ x] Fever [x ] Chills [ x] Fatigue [ ] Weight change   HEENT: [ ] Blurred vision [ ] Eye Pain [ -] Headache [ ] Runny nose [ ] Sore Throat   Respiratory: [ ] Cough [ ] Wheezing [x] Shortness of breath  Cardiovascular: [ ] Chest Pain [ ] Palpitations [x ] PEÑA [ ] PND [ ] Orthopnea  Gastrointestinal: [x ] Abdominal Pain [ ] Diarrhea [ ] Constipation [ ] Hemorrhoids [ -] Nausea [- ] Vomiting  Genitourinary: [ ] Nocturia [x ] Dysuria [ ] Incontinence  Extremities: [ -] Swelling [ ] Joint Pain  Neurologic: [ ] Focal deficit [ ] Paresthesias [- ] Syncope  Skin: [- ] Rash [ ] Ecchymoses [ ] Wounds [ ] Lesions  Psychiatry: [ ] Depression [ ] Suicidal/Homicidal Ideation [- ] Anxiety [ ] Sleep Disturbances  [ x] 10 point review of systems is otherwise negative except as mentioned above            [ ]Unable to obtain    PHYSICAL EXAM:  T(C): 39.1 (12-04-17 @ 10:54), Max: 39.1 (12-04-17 @ 10:54)  HR: 110 (12-04-17 @ 10:54) (110 - 118)  RR: 22 (12-04-17 @ 10:54) (20 - 22)  SpO2: 100% (12-04-17 @ 10:54) (99% - 100%)  I&O's Summary    HeartMate II  Speed 9000rpm  Flow 5.4L  PI 5.2  Power 5.3    Appearance: chronically ill appearing, no acute distress  HEENT:   dry mmm	  Cardiovascular: s1s2 present, typical LVAD hum present. elevated JVP present  Respiratory: Lungs clear to auscultation anteriorly, mildly coarse breath sounds at the bases	  Psychiatry: A & O x 3, Mood & affect appropriate  Gastrointestinal:  soft, driveline intact. mild ttp without rebound present surrounding driveline without any exudates. RUQ ttp  Skin: No cyanosis	  Neurologic: grossly non-focal  Extremities: No cyanosis  Vascular: Peripheral pulses palpable 2+ bilaterally    LABS:	 	  CBC Full  -  ( 04 Dec 2017 10:59 )  WBC Count : 16.2 K/uL  Hemoglobin : 10.9 g/dL  Hematocrit : 32.9 %  Platelet Count - Automated : 147 K/uL  Auto Neutrophil % : 82.0 %  Auto Lymphocyte % : 3.0 %  Auto Monocyte % : 9.0 %  Auto Eosinophil % : 2.0 %  Auto Basophil % : 0.0 %    12-04  127<L>  |  89<L>  |  61<H>  ----------------------------<  128<H>  5.0   |  24  |  1.82<H>    Ca    9.5      04 Dec 2017 10:59  TPro  8.7<H>  /  Alb  3.5  /  TBili  0.4  /  DBili  x   /  AST  57<H>  /  ALT  42  /  AlkPhos  99  12-04    HgA1c: Hemoglobin A1C, Whole Blood: 6.2 % (09-01 @ 00:08)  Urinalysis (12.04.17 @ 12:41)    Glucose Qualitative, Urine: Negative    pH Urine: 6.0    Blood, Urine: Moderate    Color: Yellow    Urine Appearance: SL Turbid    Bilirubin: Negative    Ketone - Urine: Negative    Specific Gravity: 1.016    Protein, Urine: 100 mg/dL    Urobilinogen: Negative    Nitrite: Positive    Leukocyte Esterase Concentration: Large    TELEMETRY: 	sinus tachycardia    RADIOLOGY: < from: CT Abdomen and Pelvis No Cont (12.04.17 @ 12:34) >  IMPRESSION:     No pleural or pericardial effusions.    Redemonstration of paraseptal emphysema.    Several linear radiopaque densities in the descending and sigmoid colon   which may be related to ingested material.    OTHER: 	    PREVIOUS DIAGNOSTIC TESTING:    [x] Echocardiogram: < from: Limited Transthoracic Echo (09.13.17 @ 11:16) >  Conclusions:  1. Left ventricular enlargement.  2. Severe global left ventricular systolic dysfunction.  LVAD cannula within the LV apex.  The interventriular  septum appears midline. Paradoxical septal motion.  3. Right ventricular enlargement with decreased right  ventricular systolic function.  4. Normal tricuspid valve.  5. Bilateral pleural effusions.  *** Compared with echocardiogram of 9/12/2017, no  significant changes noted. Heart Failure Initial Consult    CHIEF COMPLAINT:  Poor PO intake and decreased UOP, abd pain    HISTORY OF PRESENT ILLNESS:  62M systolic heart failure s/p TV annuloplasty, HeartMate II LVAD placed 9/12/2017 with postoperative course c/b L hemothorax who presents from home with three days of fatigue, decreased UOP, and poor PO/anorexia and hiccups. He is currently unable to care for himself. Family at bedside reports he recently saw a urologist the week prior and was prescribed a "natural remedY" but unknown what type. Since then he has not felt well. No diarrhea, nausea, or vomiting. Increased PEÑA. Denies any LE swelling.    In the ED found to have leukocytosis and febrile to T102.4F. Labs significant for RASHAWN and hyponatremia.    Allergies  No Known Allergies  	  MEDICATIONS:  cefTRIAXone   IVPB 1 Gram(s) IV Intermittent Once  acetaminophen  IVPB 1000 milliGRAM(s) IV Intermittent once  famotidine Injectable 20 milliGRAM(s) IV Push once  sodium chloride 0.9% Bolus 250 milliLiter(s) IV Bolus once    PAST MEDICAL & SURGICAL HISTORY:  No pertinent past medical history  No significant past surgical history    FAMILY HISTORY:  No pertinent family history in first degree relatives    SOCIAL HISTORY:    [ ] Non-smoker  [x ] Smoker  [ ] Alcohol    Review of Systems:  Constitutional: [ x] Fever [x ] Chills [ x] Fatigue [ ] Weight change   HEENT: [ ] Blurred vision [ ] Eye Pain [ -] Headache [ ] Runny nose [ ] Sore Throat   Respiratory: [ ] Cough [ ] Wheezing [x] Shortness of breath  Cardiovascular: [ ] Chest Pain [ ] Palpitations [x ] PEÑA [ ] PND [ ] Orthopnea  Gastrointestinal: [x ] Abdominal Pain [ ] Diarrhea [ ] Constipation [ ] Hemorrhoids [ -] Nausea [- ] Vomiting  Genitourinary: [ ] Nocturia [x ] Dysuria [ ] Incontinence  Extremities: [ -] Swelling [ ] Joint Pain  Neurologic: [ ] Focal deficit [ ] Paresthesias [- ] Syncope  Skin: [- ] Rash [ ] Ecchymoses [ ] Wounds [ ] Lesions  Psychiatry: [ ] Depression [ ] Suicidal/Homicidal Ideation [- ] Anxiety [ ] Sleep Disturbances  [ x] 10 point review of systems is otherwise negative except as mentioned above            [ ]Unable to obtain    PHYSICAL EXAM:  T(C): 39.1 (12-04-17 @ 10:54), Max: 39.1 (12-04-17 @ 10:54)  HR: 110 (12-04-17 @ 10:54) (110 - 118)  RR: 22 (12-04-17 @ 10:54) (20 - 22)  SpO2: 100% (12-04-17 @ 10:54) (99% - 100%)  I&O's Summary    HeartMate II  Speed 9000rpm  Flow 5.4L  PI 5.2  Power 5.3  No alarms.   No programming changes made.   Driveline site clean, dry, and intact.     Appearance: chronically ill appearing, no acute distress  HEENT:   dry mmm	  Cardiovascular: s1s2 present, typical LVAD hum present. elevated JVP present  Respiratory: Lungs clear to auscultation anteriorly, mildly coarse breath sounds at the bases	  Psychiatry: A & O x 3, Mood & affect appropriate  Gastrointestinal:  soft, driveline intact. mild ttp without rebound present surrounding driveline without any exudates. RUQ ttp  Skin: No cyanosis	  Neurologic: grossly non-focal  Extremities: No cyanosis  Vascular: Peripheral pulses palpable 2+ bilaterally    LABS:	 	  CBC Full  -  ( 04 Dec 2017 10:59 )  WBC Count : 16.2 K/uL  Hemoglobin : 10.9 g/dL  Hematocrit : 32.9 %  Platelet Count - Automated : 147 K/uL  Auto Neutrophil % : 82.0 %  Auto Lymphocyte % : 3.0 %  Auto Monocyte % : 9.0 %  Auto Eosinophil % : 2.0 %  Auto Basophil % : 0.0 %    12-04  127<L>  |  89<L>  |  61<H>  ----------------------------<  128<H>  5.0   |  24  |  1.82<H>    Ca    9.5      04 Dec 2017 10:59  TPro  8.7<H>  /  Alb  3.5  /  TBili  0.4  /  DBili  x   /  AST  57<H>  /  ALT  42  /  AlkPhos  99  12-04    HgA1c: Hemoglobin A1C, Whole Blood: 6.2 % (09-01 @ 00:08)  Urinalysis (12.04.17 @ 12:41)    Glucose Qualitative, Urine: Negative    pH Urine: 6.0    Blood, Urine: Moderate    Color: Yellow    Urine Appearance: SL Turbid    Bilirubin: Negative    Ketone - Urine: Negative    Specific Gravity: 1.016    Protein, Urine: 100 mg/dL    Urobilinogen: Negative    Nitrite: Positive    Leukocyte Esterase Concentration: Large    TELEMETRY: 	sinus tachycardia    RADIOLOGY: < from: CT Abdomen and Pelvis No Cont (12.04.17 @ 12:34) >  IMPRESSION:     No pleural or pericardial effusions.    Redemonstration of paraseptal emphysema.    Several linear radiopaque densities in the descending and sigmoid colon   which may be related to ingested material.    OTHER: 	    PREVIOUS DIAGNOSTIC TESTING:    [x] Echocardiogram: < from: Limited Transthoracic Echo (09.13.17 @ 11:16) >  Conclusions:  1. Left ventricular enlargement.  2. Severe global left ventricular systolic dysfunction.  LVAD cannula within the LV apex.  The interventriular  septum appears midline. Paradoxical septal motion.  3. Right ventricular enlargement with decreased right  ventricular systolic function.  4. Normal tricuspid valve.  5. Bilateral pleural effusions.  *** Compared with echocardiogram of 9/12/2017, no  significant changes noted.

## 2017-12-04 NOTE — ED ADULT NURSE NOTE - OBJECTIVE STATEMENT
63 yo M presents to ED A+OX3 c/o difficulty urinating. Family at bedside, states for the past three days pt. has had increasing weakness, difficulty ambulating due to weakness and difficulty urinating. As per family, pt. is on antibiotics after seeing his urologist and being treated for a UTI. Pt. 63 yo M presents to ED A+OX3 c/o difficulty urinating. Family at bedside, states for the past three days pt. has had increasing weakness, difficulty ambulating due to weakness and difficulty urinating. As per family, pt. is on antibiotics after seeing his urologist and being treated for a UTI. EKG completed and given to MD, pt. on CM-tachycardic. MD aware of vitals, medications to be given as ordered. Breathing unlabored on RA, lungs CTA. Skin warm dry and of color appropriate for ethnicity. Abdomen soft, nondistended, nontender. Pt. has LVAD, LVAD team contacted and made aware that patient is in ED. Pt. denies chest pain, SOB, N/V, back pain. Family denies pt. having recent falls. Comfort and safety measures in place. Family at bedside.

## 2017-12-05 DIAGNOSIS — I50.20 UNSPECIFIED SYSTOLIC (CONGESTIVE) HEART FAILURE: ICD-10-CM

## 2017-12-05 DIAGNOSIS — I50.22 CHRONIC SYSTOLIC (CONGESTIVE) HEART FAILURE: ICD-10-CM

## 2017-12-05 LAB
ALBUMIN SERPL ELPH-MCNC: 3.2 G/DL — LOW (ref 3.3–5)
ALP SERPL-CCNC: 117 U/L — SIGNIFICANT CHANGE UP (ref 40–120)
ALT FLD-CCNC: 38 U/L RC — SIGNIFICANT CHANGE UP (ref 10–45)
ANION GAP SERPL CALC-SCNC: 16 MMOL/L — SIGNIFICANT CHANGE UP (ref 5–17)
AST SERPL-CCNC: 47 U/L — HIGH (ref 10–40)
BASOPHILS # BLD AUTO: 0 K/UL — SIGNIFICANT CHANGE UP (ref 0–0.2)
BASOPHILS NFR BLD AUTO: 0 % — SIGNIFICANT CHANGE UP (ref 0–2)
BILIRUB SERPL-MCNC: 0.3 MG/DL — SIGNIFICANT CHANGE UP (ref 0.2–1.2)
BUN SERPL-MCNC: 54 MG/DL — HIGH (ref 7–23)
CALCIUM SERPL-MCNC: 9.4 MG/DL — SIGNIFICANT CHANGE UP (ref 8.4–10.5)
CHLORIDE SERPL-SCNC: 92 MMOL/L — LOW (ref 96–108)
CO2 SERPL-SCNC: 22 MMOL/L — SIGNIFICANT CHANGE UP (ref 22–31)
CORE LAB FLUID CYTOLOGY: NORMAL
CREAT SERPL-MCNC: 1.44 MG/DL — HIGH (ref 0.5–1.3)
CULTURE RESULTS: NO GROWTH — SIGNIFICANT CHANGE UP
EOSINOPHIL # BLD AUTO: 0 K/UL — SIGNIFICANT CHANGE UP (ref 0–0.5)
EOSINOPHIL NFR BLD AUTO: 0 % — SIGNIFICANT CHANGE UP (ref 0–6)
GLUCOSE SERPL-MCNC: 142 MG/DL — HIGH (ref 70–99)
HCT VFR BLD CALC: 34.7 % — LOW (ref 39–50)
HGB BLD-MCNC: 11.5 G/DL — LOW (ref 13–17)
INR BLD: 1.76 RATIO — HIGH (ref 0.88–1.16)
LDH SERPL L TO P-CCNC: 308 U/L — HIGH (ref 50–242)
LYMPHOCYTES # BLD AUTO: 0.9 K/UL — LOW (ref 1–3.3)
LYMPHOCYTES # BLD AUTO: 5 % — LOW (ref 13–44)
MCHC RBC-ENTMCNC: 31.1 PG — SIGNIFICANT CHANGE UP (ref 27–34)
MCHC RBC-ENTMCNC: 33.3 GM/DL — SIGNIFICANT CHANGE UP (ref 32–36)
MCV RBC AUTO: 93.6 FL — SIGNIFICANT CHANGE UP (ref 80–100)
MONOCYTES # BLD AUTO: 1.4 K/UL — HIGH (ref 0–0.9)
MONOCYTES NFR BLD AUTO: 10 % — SIGNIFICANT CHANGE UP (ref 2–14)
NEUTROPHILS # BLD AUTO: 11.9 K/UL — HIGH (ref 1.8–7.4)
NEUTROPHILS NFR BLD AUTO: 77 % — SIGNIFICANT CHANGE UP (ref 43–77)
PLATELET # BLD AUTO: 168 K/UL — SIGNIFICANT CHANGE UP (ref 150–400)
POTASSIUM SERPL-MCNC: 4.8 MMOL/L — SIGNIFICANT CHANGE UP (ref 3.5–5.3)
POTASSIUM SERPL-SCNC: 4.8 MMOL/L — SIGNIFICANT CHANGE UP (ref 3.5–5.3)
PROT SERPL-MCNC: 8.2 G/DL — SIGNIFICANT CHANGE UP (ref 6–8.3)
PROTHROM AB SERPL-ACNC: 19.4 SEC — HIGH (ref 9.8–12.7)
RBC # BLD: 3.7 M/UL — LOW (ref 4.2–5.8)
RBC # FLD: 14.8 % — HIGH (ref 10.3–14.5)
SODIUM SERPL-SCNC: 130 MMOL/L — LOW (ref 135–145)
SPECIMEN SOURCE: SIGNIFICANT CHANGE UP
WBC # BLD: 14.2 K/UL — HIGH (ref 3.8–10.5)
WBC # FLD AUTO: 14.2 K/UL — HIGH (ref 3.8–10.5)

## 2017-12-05 PROCEDURE — 99232 SBSQ HOSP IP/OBS MODERATE 35: CPT

## 2017-12-05 PROCEDURE — 99231 SBSQ HOSP IP/OBS SF/LOW 25: CPT

## 2017-12-05 PROCEDURE — 93750 INTERROGATION VAD IN PERSON: CPT

## 2017-12-05 PROCEDURE — 99233 SBSQ HOSP IP/OBS HIGH 50: CPT | Mod: 25,GC

## 2017-12-05 RX ORDER — WARFARIN SODIUM 2.5 MG/1
5 TABLET ORAL ONCE
Qty: 0 | Refills: 0 | Status: COMPLETED | OUTPATIENT
Start: 2017-12-05 | End: 2017-12-05

## 2017-12-05 RX ORDER — WARFARIN SODIUM 2.5 MG/1
1.5 TABLET ORAL ONCE
Qty: 0 | Refills: 0 | Status: DISCONTINUED | OUTPATIENT
Start: 2017-12-05 | End: 2017-12-05

## 2017-12-05 RX ORDER — SODIUM CHLORIDE 9 MG/ML
500 INJECTION INTRAMUSCULAR; INTRAVENOUS; SUBCUTANEOUS ONCE
Qty: 0 | Refills: 0 | Status: COMPLETED | OUTPATIENT
Start: 2017-12-05 | End: 2017-12-05

## 2017-12-05 RX ADMIN — CEFTRIAXONE 100 GRAM(S): 500 INJECTION, POWDER, FOR SOLUTION INTRAMUSCULAR; INTRAVENOUS at 12:59

## 2017-12-05 RX ADMIN — SODIUM CHLORIDE 3 MILLILITER(S): 9 INJECTION INTRAMUSCULAR; INTRAVENOUS; SUBCUTANEOUS at 21:43

## 2017-12-05 RX ADMIN — WARFARIN SODIUM 5 MILLIGRAM(S): 2.5 TABLET ORAL at 21:45

## 2017-12-05 RX ADMIN — PANTOPRAZOLE SODIUM 40 MILLIGRAM(S): 20 TABLET, DELAYED RELEASE ORAL at 05:37

## 2017-12-05 RX ADMIN — SODIUM CHLORIDE 500 MILLILITER(S): 9 INJECTION INTRAMUSCULAR; INTRAVENOUS; SUBCUTANEOUS at 08:30

## 2017-12-05 RX ADMIN — FINASTERIDE 5 MILLIGRAM(S): 5 TABLET, FILM COATED ORAL at 13:00

## 2017-12-05 RX ADMIN — Medication 250 MILLIGRAM(S): at 17:33

## 2017-12-05 RX ADMIN — SODIUM CHLORIDE 3 MILLILITER(S): 9 INJECTION INTRAMUSCULAR; INTRAVENOUS; SUBCUTANEOUS at 05:31

## 2017-12-05 RX ADMIN — MIRTAZAPINE 7.5 MILLIGRAM(S): 45 TABLET, ORALLY DISINTEGRATING ORAL at 21:45

## 2017-12-05 RX ADMIN — SPIRONOLACTONE 12.5 MILLIGRAM(S): 25 TABLET, FILM COATED ORAL at 05:37

## 2017-12-05 RX ADMIN — Medication 81 MILLIGRAM(S): at 12:59

## 2017-12-05 RX ADMIN — Medication 75 MILLIGRAM(S): at 17:33

## 2017-12-05 RX ADMIN — SODIUM CHLORIDE 3 MILLILITER(S): 9 INJECTION INTRAMUSCULAR; INTRAVENOUS; SUBCUTANEOUS at 12:55

## 2017-12-05 NOTE — PROGRESS NOTE ADULT - SUBJECTIVE AND OBJECTIVE BOX
Subjective Hello I had the pain in my belly    VITAL SIGNS    Telemetry:  NSR/ST    Vital Signs Last 24 Hrs  T(C): 36.4 (12-05-17 @ 12:00), Max: 39.3 (12-04-17 @ 21:11)  T(F): 97.6 (12-05-17 @ 12:00), Max: 102.7 (12-04-17 @ 21:11)  HR: 107 (12-05-17 @ 12:00) (96 - 120)  BP: --  RR: 18 (12-05-17 @ 12:00) (18 - 20)  SpO2: 97% (12-05-17 @ 12:00) (96% - 99%)   LVAD   spd  9000  flow 4.6  power5  PI 4.5           12-04 @ 07:01  -  12-05 @ 07:00  --------------------------------------------------------  IN: 1100 mL / OUT: 600 mL / NET: 500 mL    12-05 @ 07:01  -  12-05 @ 15:25  --------------------------------------------------------  IN: 740 mL / OUT: 200 mL / NET: 540 mL  MEDICATIONS  (STANDING):  aspirin enteric coated 81 milliGRAM(s) Oral daily  cefTRIAXone   IVPB 1 Gram(s) IV Intermittent every 24 hours  finasteride 5 milliGRAM(s) Oral daily  lidocaine   Patch 1 Patch Transdermal every 24 hours  lisinopril 7.5 milliGRAM(s) Oral at bedtime  metoprolol succinate ER 75 milliGRAM(s) Oral daily  mirtazapine 7.5 milliGRAM(s) Oral at bedtime  pantoprazole    Tablet 40 milliGRAM(s) Oral before breakfast  sodium chloride 0.9% lock flush 3 milliLiter(s) IV Push every 8 hours  spironolactone 12.5 milliGRAM(s) Oral daily  vancomycin  IVPB 1000 milliGRAM(s) IV Intermittent every 24 hours  vancomycin  IVPB      warfarin 1.5 milliGRAM(s) Oral once    MEDICATIONS  (PRN):  acetaminophen   Tablet 650 milliGRAM(s) Oral every 6 hours PRN For Temp greater than 38.5 C (101.3 F)  polyethylene glycol 3350 17 Gram(s) Oral two times a day PRN Constipation    Coumadin    [x ] YES          [  ]      NO         Reason:   LVAD       PHYSICAL EXAM  Neurology: alert and oriented x 3, nonfocal, no gross deficits  CV :LVAD sounds   Sternal Wound :  healed  drive line CDI  Lungs:CTA  Abdomen: soft, nontender, nondistended, positive bowel sounds, last bowel movement 12/4  :    voiding dysuria          Extremities:  B/L lE warm well perfused no edema        disposition to home   Home  [ x ]      Discussed with Cardiothoracic Team at AM rounds.

## 2017-12-05 NOTE — PROGRESS NOTE ADULT - PROBLEM SELECTOR PLAN 1
appears to be functioning normally  maintain current settings  cont asa 81mg daily  cont coumadin.  1.5 his INR is subtherapeutic

## 2017-12-05 NOTE — PROGRESS NOTE ADULT - ASSESSMENT
62M hx of NICM EF 10% s/p TV annuloplasty, HeartMate II LVAD placed 9/12/2017 with several days of poor PO, found to have UTI with improvement in symptoms with antibiotics and IV fluids. His appetite is mildly increased.

## 2017-12-05 NOTE — PROGRESS NOTE ADULT - ASSESSMENT
62M w/PMH CHF s/p LVAD 09/2017 on coumadin c/b UTI (off abx >2w), SIADH, CKD3, smoking who presented to [facility] ER 12/4/17  with c/o fatigue x 3-4 days.  Reports associated symptom of hiccups, dysuria, decreased  urine output, sob, cp, and decreased PO intake 2/2 abd pain when eating.  Patient was found to have (+) UA and admitted for urosepsis.   12/5 Coumadin 1.5 for tonight Seen by ID on Vancomycin 1 cortez q d and ceftriaxone 1 gram qd Blood cx negative urine cx pending Renal US ordered 500 .9 NS bolus for hypovolemia. Hold  lisinopril per HF.

## 2017-12-05 NOTE — PROGRESS NOTE ADULT - ASSESSMENT
61 yo with s/p LVAD placement admitted with fevers and a UTI. Ct negative for renal stones, will check ultrasound of kidneys to look for hydronephrosis/stones.  Of note, CT of abdomen with radiopaque linear densities in the sigmoid colon area which matches his area of pain and of unclear etiology at this point.  Blood cultures are NGTD from 12/4/17 and Urine culture is pending.  Will continue the Vanco/Ceftriaxone pending organism ID/sens.  Please check Vanco trough prior to third dose  If pain persists in LLQ may need to ask GI to see patient to further evaluate the radiodensities seen on CT scan

## 2017-12-05 NOTE — PROGRESS NOTE ADULT - PROBLEM SELECTOR PLAN 1
appears to be functioning normally  maintain current settings appears to be functioning normally  maintain current settings  cont asa 81mg daily  cont coumadin. his INR is subtherapeutic

## 2017-12-05 NOTE — PROGRESS NOTE ADULT - SUBJECTIVE AND OBJECTIVE BOX
Heart Failure Progress Note    Interval History:  Pt s/p 500cc IVF and appears to be more awake. Does not complain of SOB. Abd pain is improved.    Medications:  acetaminophen   Tablet 650 milliGRAM(s) Oral every 6 hours PRN  aspirin enteric coated 81 milliGRAM(s) Oral daily  cefTRIAXone   IVPB 1 Gram(s) IV Intermittent every 24 hours  finasteride 5 milliGRAM(s) Oral daily  lidocaine   Patch 1 Patch Transdermal every 24 hours  lisinopril 7.5 milliGRAM(s) Oral at bedtime  metoprolol succinate ER 75 milliGRAM(s) Oral daily  mirtazapine 7.5 milliGRAM(s) Oral at bedtime  pantoprazole    Tablet 40 milliGRAM(s) Oral before breakfast  polyethylene glycol 3350 17 Gram(s) Oral two times a day PRN  sodium chloride 0.9% lock flush 3 milliLiter(s) IV Push every 8 hours  spironolactone 12.5 milliGRAM(s) Oral daily  vancomycin  IVPB 1000 milliGRAM(s) IV Intermittent every 24 hours  vancomycin  IVPB        Vitals:  T(C): 36.4 (12-05-17 @ 12:00), Max: 39.3 (12-04-17 @ 21:11)  HR: 107 (12-05-17 @ 12:00) (96 - 120)  BP(mean): 68 (12-05-17 @ 12:00) (60 - 84)  RR: 18 (12-05-17 @ 12:00) (18 - 22)  SpO2: 97% (12-05-17 @ 12:00) (96% - 99%)    I&O's Summary  04 Dec 2017 07:01  -  05 Dec 2017 07:00  --------------------------------------------------------  IN: 1100 mL / OUT: 600 mL / NET: 500 mL    05 Dec 2017 07:01  -  05 Dec 2017 13:33  --------------------------------------------------------  IN: 740 mL / OUT: 200 mL / NET: 540 mL    Physical Exam:  Appearance: no acute distress  HEENT:  mmm, no significant JVP elevation  Cardiovascular: s1s2 present, typical LVAD hum present, no LE edema  Respiratory: Lungs clear to auscultation	  Psychiatry: A & O x 3, Mood & affect appropriate  Gastrointestinal:  soft, driveline intact  Skin: No cyanosis	  Neurologic: grossly non-focal  Extremities: No cyanosis    LVAD Interrogation: HeartMate II  Pump Flow: 4.3  Pump Speed: 9000  Pulse Index: 4.0  Pump Power: 4.0  VAD Events:   Driveline evaluation:  intact  Programming Changes: No changes made    Labs:                11.5   14.2  )-----------( 168      ( 05 Dec 2017 05:23 )             34.7     12-05  130<L>  |  92<L>  |  54<H>  ----------------------------<  142<H>  4.8   |  22  |  1.44<H>    Ca    9.4      05 Dec 2017 05:23    TPro  8.2  /  Alb  3.2<L>  /  TBili  0.3  /  DBili  x   /  AST  47<H>  /  ALT  38  /  AlkPhos  117  12-05  PT/INR - ( 05 Dec 2017 05:23 )   PT: 19.4 sec;   INR: 1.76 ratio    PTT - ( 04 Dec 2017 10:59 )  PTT:30.0 sec  Lactate Dehydrogenase, Serum: 308 U/L (12-05 @ 05:23)    TELEMETRY: SR 90-120s with PAT

## 2017-12-05 NOTE — PROGRESS NOTE ADULT - SUBJECTIVE AND OBJECTIVE BOX
INFECTIOUS DISEASES FOLLOW UP-- Anitra Prater  [phone]    This is a follow up note for this  62yMale with  Sepsis from a urinary source. Hx. of LVAd      ROS:  CONSTITUTIONAL:  fever curve improving good appetite  CARDIOVASCULAR:  No chest pain or palpitations  RESPIRATORY:  No dyspnea  GASTROINTESTINAL:  No nausea, vomiting, diarrhea, or  lower abdominal pain over pelvic area  GENITOURINARY:  No dysuria  NEUROLOGIC:  No headache,     Allergies    No Known Allergies    Intolerances        ANTIBIOTICS/RELEVANT:  antimicrobials  cefTRIAXone   IVPB 1 Gram(s) IV Intermittent every 24 hours  vancomycin  IVPB 1000 milliGRAM(s) IV Intermittent every 24 hours  vancomycin  IVPB        immunologic:    OTHER:  acetaminophen   Tablet 650 milliGRAM(s) Oral every 6 hours PRN  aspirin enteric coated 81 milliGRAM(s) Oral daily  finasteride 5 milliGRAM(s) Oral daily  lidocaine   Patch 1 Patch Transdermal every 24 hours  lisinopril 7.5 milliGRAM(s) Oral at bedtime  metoprolol succinate ER 75 milliGRAM(s) Oral daily  mirtazapine 7.5 milliGRAM(s) Oral at bedtime  pantoprazole    Tablet 40 milliGRAM(s) Oral before breakfast  polyethylene glycol 3350 17 Gram(s) Oral two times a day PRN  sodium chloride 0.9% lock flush 3 milliLiter(s) IV Push every 8 hours  spironolactone 12.5 milliGRAM(s) Oral daily      Objective:  Vital Signs Last 24 Hrs  T(C): 36.4 (05 Dec 2017 12:00), Max: 39.3 (04 Dec 2017 21:11)  T(F): 97.6 (05 Dec 2017 12:00), Max: 102.7 (04 Dec 2017 21:11)  HR: 107 (05 Dec 2017 12:00) (96 - 120)  BP: --  BP(mean): 68 (05 Dec 2017 12:00) (60 - 84)  RR: 18 (05 Dec 2017 12:00) (18 - 22)  SpO2: 97% (05 Dec 2017 12:00) (96% - 99%)    PHYSICAL EXAM:  Constitutional:no acute distress  Eyes:ALONSO, EOMI  Ear/Nose/Throat: no oral lesions, 	  Respiratory: clear BL  Cardiovascular: chest sternotomoy healed, LAVd dressing dry intact LVAD sounds throughout  Gastrointestinal:soft, (+) BS, no tenderness except over pelvic area RLQ and LLQ  Extremities:no e/e/c  No Lymphadenopathy  IV sites not inflammed.    LABS:                        11.5   14.2  )-----------( 168      ( 05 Dec 2017 05:23 )             34.7     12    130<L>  |  92<L>  |  54<H>  ----------------------------<  142<H>  4.8   |  22  |  1.44<H>    Ca    9.4      05 Dec 2017 05:23    TPro  8.2  /  Alb  3.2<L>  /  TBili  0.3  /  DBili  x   /  AST  47<H>  /  ALT  38  /  AlkPhos  117  12    PT/INR - ( 05 Dec 2017 05:23 )   PT: 19.4 sec;   INR: 1.76 ratio         PTT - ( 04 Dec 2017 10:59 )  PTT:30.0 sec  Urinalysis Basic - ( 04 Dec 2017 12:41 )    Color: Yellow / Appearance: SL Turbid / S.016 / pH: x  Gluc: x / Ketone: Negative  / Bili: Negative / Urobili: Negative   Blood: x / Protein: 100 mg/dL / Nitrite: Positive   Leuk Esterase: Large / RBC: 2-5 /HPF / WBC >50 /HPF   Sq Epi: x / Non Sq Epi: x / Bacteria: Many /HPF        MICROBIOLOGY:            RECENT CULTURES:   @ 13:22  .Blood Blood-Peripheral  --  --  --    No growth to date.  --      RADIOLOGY & ADDITIONAL STUDIES:    < from: CT Abdomen and Pelvis No Cont (17 @ 12:34) >  IMPRESSION:     No pleural or pericardial effusions.    Redemonstration of paraseptal emphysema.    Several linear radiopaque densities in the descending and sigmoid colon   which may be related to ingested material.    < end of copied text >

## 2017-12-05 NOTE — PROGRESS NOTE ADULT - PROBLEM SELECTOR PLAN 4
improving with IVF and abx  cont to monitor  hold lisinopril  f/u renal ultrasound for eval of nephrolithiasis

## 2017-12-06 LAB
ANION GAP SERPL CALC-SCNC: 13 MMOL/L — SIGNIFICANT CHANGE UP (ref 5–17)
APTT BLD: 35.7 SEC — SIGNIFICANT CHANGE UP (ref 27.5–37.4)
BUN SERPL-MCNC: 33 MG/DL — HIGH (ref 7–23)
CALCIUM SERPL-MCNC: 9.7 MG/DL — SIGNIFICANT CHANGE UP (ref 8.4–10.5)
CHLORIDE SERPL-SCNC: 97 MMOL/L — SIGNIFICANT CHANGE UP (ref 96–108)
CO2 SERPL-SCNC: 26 MMOL/L — SIGNIFICANT CHANGE UP (ref 22–31)
CREAT SERPL-MCNC: 1.04 MG/DL — SIGNIFICANT CHANGE UP (ref 0.5–1.3)
GLUCOSE SERPL-MCNC: 164 MG/DL — HIGH (ref 70–99)
HCT VFR BLD CALC: 34.2 % — LOW (ref 39–50)
HGB BLD-MCNC: 11.3 G/DL — LOW (ref 13–17)
INR BLD: 2.55 RATIO — HIGH (ref 0.88–1.16)
LDH SERPL L TO P-CCNC: 264 U/L — HIGH (ref 50–242)
MCHC RBC-ENTMCNC: 31.2 PG — SIGNIFICANT CHANGE UP (ref 27–34)
MCHC RBC-ENTMCNC: 33.1 GM/DL — SIGNIFICANT CHANGE UP (ref 32–36)
MCV RBC AUTO: 94.5 FL — SIGNIFICANT CHANGE UP (ref 80–100)
PLATELET # BLD AUTO: 211 K/UL — SIGNIFICANT CHANGE UP (ref 150–400)
POTASSIUM SERPL-MCNC: 4.2 MMOL/L — SIGNIFICANT CHANGE UP (ref 3.5–5.3)
POTASSIUM SERPL-SCNC: 4.2 MMOL/L — SIGNIFICANT CHANGE UP (ref 3.5–5.3)
PROTHROM AB SERPL-ACNC: 28.3 SEC — HIGH (ref 9.8–12.7)
RBC # BLD: 3.62 M/UL — LOW (ref 4.2–5.8)
RBC # FLD: 14.9 % — HIGH (ref 10.3–14.5)
SODIUM SERPL-SCNC: 136 MMOL/L — SIGNIFICANT CHANGE UP (ref 135–145)
WBC # BLD: 11.9 K/UL — HIGH (ref 3.8–10.5)
WBC # FLD AUTO: 11.9 K/UL — HIGH (ref 3.8–10.5)

## 2017-12-06 PROCEDURE — 99233 SBSQ HOSP IP/OBS HIGH 50: CPT | Mod: 25,GC

## 2017-12-06 PROCEDURE — 99232 SBSQ HOSP IP/OBS MODERATE 35: CPT

## 2017-12-06 PROCEDURE — 93750 INTERROGATION VAD IN PERSON: CPT

## 2017-12-06 RX ORDER — WARFARIN SODIUM 2.5 MG/1
1 TABLET ORAL ONCE
Qty: 0 | Refills: 0 | Status: COMPLETED | OUTPATIENT
Start: 2017-12-06 | End: 2017-12-06

## 2017-12-06 RX ADMIN — FINASTERIDE 5 MILLIGRAM(S): 5 TABLET, FILM COATED ORAL at 12:02

## 2017-12-06 RX ADMIN — SPIRONOLACTONE 12.5 MILLIGRAM(S): 25 TABLET, FILM COATED ORAL at 06:03

## 2017-12-06 RX ADMIN — PANTOPRAZOLE SODIUM 40 MILLIGRAM(S): 20 TABLET, DELAYED RELEASE ORAL at 06:04

## 2017-12-06 RX ADMIN — LIDOCAINE 1 PATCH: 4 CREAM TOPICAL at 12:02

## 2017-12-06 RX ADMIN — SODIUM CHLORIDE 3 MILLILITER(S): 9 INJECTION INTRAMUSCULAR; INTRAVENOUS; SUBCUTANEOUS at 22:01

## 2017-12-06 RX ADMIN — WARFARIN SODIUM 1 MILLIGRAM(S): 2.5 TABLET ORAL at 22:02

## 2017-12-06 RX ADMIN — Medication 81 MILLIGRAM(S): at 12:02

## 2017-12-06 RX ADMIN — LIDOCAINE 1 PATCH: 4 CREAM TOPICAL at 19:00

## 2017-12-06 RX ADMIN — CEFTRIAXONE 100 GRAM(S): 500 INJECTION, POWDER, FOR SOLUTION INTRAMUSCULAR; INTRAVENOUS at 12:07

## 2017-12-06 RX ADMIN — SODIUM CHLORIDE 3 MILLILITER(S): 9 INJECTION INTRAMUSCULAR; INTRAVENOUS; SUBCUTANEOUS at 14:40

## 2017-12-06 RX ADMIN — Medication 250 MILLIGRAM(S): at 16:07

## 2017-12-06 RX ADMIN — SODIUM CHLORIDE 3 MILLILITER(S): 9 INJECTION INTRAMUSCULAR; INTRAVENOUS; SUBCUTANEOUS at 05:58

## 2017-12-06 RX ADMIN — MIRTAZAPINE 7.5 MILLIGRAM(S): 45 TABLET, ORALLY DISINTEGRATING ORAL at 22:02

## 2017-12-06 RX ADMIN — Medication 75 MILLIGRAM(S): at 17:36

## 2017-12-06 NOTE — DIETITIAN INITIAL EVALUATION ADULT. - ENERGY NEEDS
Ht: 5'8", Wt: 126.3lbs, BMI: 19.2kg/m2, IBW: 154lbs(+/-10%), 81%IBW  Pertinent information: PHMx of Chronic systolic HF, s/p Heart Mate II placed 9/12/2017, admits now with RASHAWN, Hyponatremia, and sepsis ? related to UTI.   No Edema, Skin intact

## 2017-12-06 NOTE — PROGRESS NOTE ADULT - ASSESSMENT
63 yo with s/p LVAD placement admitted with fevers and a UTI. Ct negative for renal stones, will check ultrasound of kidneys to look for hydronephrosis/stones.  Of note, CT of abdomen with radiopaque linear densities in the sigmoid colon area which matches his area of pain and of unclear etiology at this point.  Blood cultures are NGTD from 12/4/17 and Urine culture is no growth  Urine culture is no growth and likely reflects sending the specimen after patient received antibiotics will continue the Ceftriaxone and discontinue the Vanco as patient grew E. coli in urine in October.  If pain persists in LLQ may need to ask GI to see patient to further evaluate the radiodensities seen on CT scan

## 2017-12-06 NOTE — PROGRESS NOTE ADULT - SUBJECTIVE AND OBJECTIVE BOX
INFECTIOUS DISEASES FOLLOW UP-- Anitra Prater  [phone]    This is a follow up note for this  62yMale with history of LVAD, admitted with fevers, markedly abnl urinalysis but - urinecx. (post abx.) ? upper tract disease improving on Ceftriaxone      ROS:  CONSTITUTIONAL:  No fever, good appetite  CARDIOVASCULAR:  No chest pain or palpitations  RESPIRATORY:  No dyspnea  GASTROINTESTINAL:  No nausea, vomiting, diarrhea, or abdominal pain  GENITOURINARY:  No dysuria  NEUROLOGIC:  No headache,     Allergies    No Known Allergies    Intolerances        ANTIBIOTICS/RELEVANT:  antimicrobials  cefTRIAXone   IVPB 1 Gram(s) IV Intermittent every 24 hours  vancomycin  IVPB 1000 milliGRAM(s) IV Intermittent every 24 hours  vancomycin  IVPB        immunologic:    OTHER:  acetaminophen   Tablet 650 milliGRAM(s) Oral every 6 hours PRN  aspirin enteric coated 81 milliGRAM(s) Oral daily  finasteride 5 milliGRAM(s) Oral daily  lidocaine   Patch 1 Patch Transdermal every 24 hours  metoprolol succinate ER 75 milliGRAM(s) Oral daily  mirtazapine 7.5 milliGRAM(s) Oral at bedtime  pantoprazole    Tablet 40 milliGRAM(s) Oral before breakfast  polyethylene glycol 3350 17 Gram(s) Oral two times a day PRN  sodium chloride 0.9% lock flush 3 milliLiter(s) IV Push every 8 hours  spironolactone 12.5 milliGRAM(s) Oral daily  warfarin 1 milliGRAM(s) Oral once      Objective:  Vital Signs Last 24 Hrs  T(C): 36.8 (06 Dec 2017 12:30), Max: 36.9 (05 Dec 2017 20:00)  T(F): 98.2 (06 Dec 2017 12:30), Max: 98.4 (05 Dec 2017 20:00)  HR: 93 (06 Dec 2017 12:30) (93 - 101)  BP: --  BP(mean): 78 (06 Dec 2017 12:30) (70 - 78)  RR: 18 (06 Dec 2017 12:30) (18 - 18)  SpO2: 96% (06 Dec 2017 12:30) (96% - 97%)    PHYSICAL EXAM:  Constitutional:no acute distress  Eyes:ALONSO, EOMI  Ear/Nose/Throat: no oral lesions, 	  Respiratory: clear BL  Cardiovascular: S1S2  Gastrointestinal:soft, (+) BS, no tenderness  Extremities:no e/e/c  No Lymphadenopathy  IV sites not inflammed.    LABS:                        11.3   11.9  )-----------( 211      ( 06 Dec 2017 16:24 )             34.2     12-06    136  |  97  |  33<H>  ----------------------------<  164<H>  4.2   |  26  |  1.04    Ca    9.7      06 Dec 2017 16:24    TPro  8.2  /  Alb  3.2<L>  /  TBili  0.3  /  DBili  x   /  AST  47<H>  /  ALT  38  /  AlkPhos  117  12-05    PT/INR - ( 06 Dec 2017 16:24 )   PT: 28.3 sec;   INR: 2.55 ratio         PTT - ( 06 Dec 2017 16:24 )  PTT:35.7 sec      MICROBIOLOGY:            RECENT CULTURES:  12-05 @ 01:25  .Blood Blood-Peripheral  --  --  --    No growth to date.  --  12-05 @ 01:04  .Urine Clean Catch (Midstream)  --  --  --    No growth  --  12-04 @ 13:22  .Blood Blood-Peripheral  --  --  --    No growth to date.  --      RADIOLOGY & ADDITIONAL STUDIES:    < from: CT Abdomen and Pelvis No Cont (12.04.17 @ 12:34) >  IMPRESSION:     No pleural or pericardial effusions.    Redemonstration of paraseptal emphysema.    Several linear radiopaque densities in the descending and sigmoid colon   which may be related to ingested material.    < end of copied text >

## 2017-12-06 NOTE — DIETITIAN INITIAL EVALUATION ADULT. - PHYSICAL APPEARANCE
underweight/other (specify)/Nutrition physical focused exam: Despite postivie weight gain, Pt presents with severe muscle wasting at temporals, clavicles, deltoids, scapulas and thighs and calfs. Sever fat wasting at orbitals, triceps, and ribs

## 2017-12-06 NOTE — DIETITIAN INITIAL EVALUATION ADULT. - ADHERENCE
Pt stats he hasn't been using salt, however is consuming prepared foods from outside the home; likely high in Na./fair

## 2017-12-06 NOTE — DIETITIAN INITIAL EVALUATION ADULT. - PERTINENT LABORATORY DATA
Hgb/Hct:11.5/34/7-low, Na:130-low, K:4.8, Cl:92, BUN:54-high, Cr:1.44-high, Glucose:142-high, Ca:9.4, Total Protein:8.2, Albumin:3.2,Total Bilirubin:0.3, AlkPhos:117, AST:47, ALT:38, LDH: 308-high

## 2017-12-06 NOTE — DIETITIAN INITIAL EVALUATION ADULT. - PT NOT SOURCE
other (specify)/Pt with limited interest in RD interview at this time. States "I just want to go home". Pt well known to this RD, however limited subjective information collected at this time

## 2017-12-06 NOTE — PROGRESS NOTE ADULT - PROBLEM SELECTOR PLAN 3
likely urinary source  follow up cultures  continue empiric ceftriaxone  s/p empiric vancomycin likely urinary source  follow up cultures negative up do date   continue empiric ceftriaxone  s/p empiric vancomycin

## 2017-12-06 NOTE — PROGRESS NOTE ADULT - ASSESSMENT
62M hx of NICM EF 10% s/p TV annuloplasty, HeartMate II LVAD placed 9/12/2017 with several days of poor PO, found to have UTI with improvement in symptoms with antibiotics and IV fluids. His appetite has improved. 62 year old man with history of ACC/AHA stage D congestive heart failure due to a NICM with severely reduced LVEF of 10% s/p TV annuloplasty and HeartMate II LVAD placed on 9/12/2017 who presented with several days of poor PO, fevers, acute renal failure, and hyponatremia. His symptoms have improved and his appetite has improved. He is on IV antibiotics but without a clear source of infection.

## 2017-12-06 NOTE — PROGRESS NOTE ADULT - ATTENDING COMMENTS
63 yo man with a history of LVAD placement in 9/2017. In October 2107 patient was treated for an E.coli UTI relatively sensitive strain. Patient being admitted with fevers to 102f and dysuria, incontinence with recent out patient visit to the Urologist.    Urine with many WBCs and bacteria  Urine culture in 10/24/17 grew E.coli sens. to Ceftriaxone  CT scan of abdomen without contrast did not show evidence of stones or pyelonephritis or prostate enlargement    Would:  1. send blood cultures x 2 sets- done  2. send urine culture- done  3. Continue the Ceftriaxone 1 gram/24hours  4. Add Vancomycin 1 gram /24 hours pending cultures  5. obtain ultrasound of the renal collecting system to further evaluate for pyelonephritis, stones, prostate      I can be reached at [phone]
He is improving with improved renal failure and hyponatremia. He continues to have fevers. We will give him an additional 500 cc of NS. We will redose coumadin at his home dose. He continues to be subtherapeutic. He has no evidence of heart failure or VAD malfunction.
He is improving with improved renal failure and hyponatremia. He has not had recurrent fevers. The source of his infection is unclear. We will redose coumadin at his home dose. He continues to be subtherapeutic. He has no evidence of heart failure or VAD malfunction.    Please call me with questions at [phone].

## 2017-12-06 NOTE — PROGRESS NOTE ADULT - PROBLEM SELECTOR PLAN 3
likely urinary source. afebrile  follow up cultures. his urine cultures are currently NGTD  continue empiric abx  s/p empiric vancomycin Improving with IVF. f/u labs

## 2017-12-06 NOTE — PROGRESS NOTE ADULT - PROBLEM SELECTOR PLAN 2
continue metoprolol  hold lisinopril for now  cont spironolactone 12.5mg daily Improving with IVF and abx.  Cont to monitor  Hold lisinopril  F/U renal ultrasound for eval of nephrolithiasis

## 2017-12-06 NOTE — DIETITIAN INITIAL EVALUATION ADULT. - NS AS NUTRI INTERV COLLABORAT
Collaboration with other providers/Malnutrition sticker placed in chart, team aware. RD remains available to monitor PO intake, wt, labs and diet education review

## 2017-12-06 NOTE — PROGRESS NOTE ADULT - PROBLEM SELECTOR PLAN 1
appears to be functioning normally  maintain current settings  cont asa 81mg daily  cont coumadin. his INR is subtherapeutic Unclear source. ID consulting.   Follow up cultures. his urine cultures are currently NGTD  Continue empiric abx  s/p empiric vancomycin

## 2017-12-06 NOTE — CHART NOTE - NSCHARTNOTEFT_GEN_A_CORE
Upon Nutritional Assessment by the Registered Dietitian your patient was determined to meet criteria / has evidence of the following diagnosis/diagnoses:          [ ]  Mild Protein Calorie Malnutrition        [ ]  Moderate Protein Calorie Malnutrition        [ X] Severe Protein Calorie Malnutrition        [ ] Unspecified Protein Calorie Malnutrition        [ ] Underweight / BMI <19        [ ] Morbid Obesity / BMI > 40      Findings as based on:  [X ] Comprehensive nutrition assessment : fair to poor PO intake PTA  [X ] Nutrition Focused Physical Exam: severe fat and muscle wasting   [ X] Other: BMI 19.2kg/m2      Nutrition Plan/Recommendations:      Liberalize diet to low Na, add ensure enlive x2     PROVIDER Section:     By signing this assessment you are acknowledging and agree with the diagnosis/diagnoses assigned by the Registered Dietitian    Comments:

## 2017-12-06 NOTE — PROGRESS NOTE ADULT - PROBLEM SELECTOR PLAN 4
improving with IVF and abx. his bloodwork from today has not yet resulted  cont to monitor  hold lisinopril  f/u renal ultrasound for eval of nephrolithiasis appears to be functioning normally  maintain current settings  cont asa 81mg daily  cont coumadin at home dose. his INR is therapeutic

## 2017-12-06 NOTE — DIETITIAN INITIAL EVALUATION ADULT. - NUTRITION INTERVENTION
Feeding Assistance/Meals and Snack/Medical Food Supplements/Nutrition Education/Collaboration and Referral of Nutrition Care

## 2017-12-06 NOTE — PROGRESS NOTE ADULT - PROBLEM SELECTOR PLAN 4
improving  s/p hydration and  continued on abx  cont to monitor  hold lisinopril  f/u renal ultrasound for eval of nephrolithiasis

## 2017-12-06 NOTE — DIETITIAN INITIAL EVALUATION ADULT. - OTHER INFO
Pt seen for LOS. Pt seen in bed, states he dislikes in house breakfast foods and is requested Ensure. Pt states he is willing to drink Ensure enlive x2 daily to supplement PO intake. Pt refusing menu selection assistance and food preferences as this time. During previous admission, Pt's family was providing meals. RD suggested the same during this admission. Pt denies preforming daily weights and has limited interest in reviewing HF nutrition therapy or Coumadin education at this time; States " I just want to go home." Pt aware that RD remains available for education review when amenable. Pt states he is taking a vitamin but is unaware of which one. Pt denies GI distress at this time. Pt denies chewing/swallowing difficulty. NKFA

## 2017-12-06 NOTE — PROGRESS NOTE ADULT - SUBJECTIVE AND OBJECTIVE BOX
Heart Failure Progress Note    Interval History:  Pt reports he feels well and would like to go home.  Tolerating PO.  Making urine.    Medications:  acetaminophen   Tablet 650 milliGRAM(s) Oral every 6 hours PRN  aspirin enteric coated 81 milliGRAM(s) Oral daily  cefTRIAXone   IVPB 1 Gram(s) IV Intermittent every 24 hours  finasteride 5 milliGRAM(s) Oral daily  lidocaine   Patch 1 Patch Transdermal every 24 hours  metoprolol succinate ER 75 milliGRAM(s) Oral daily  mirtazapine 7.5 milliGRAM(s) Oral at bedtime  pantoprazole    Tablet 40 milliGRAM(s) Oral before breakfast  polyethylene glycol 3350 17 Gram(s) Oral two times a day PRN  sodium chloride 0.9% lock flush 3 milliLiter(s) IV Push every 8 hours  spironolactone 12.5 milliGRAM(s) Oral daily  vancomycin  IVPB 1000 milliGRAM(s) IV Intermittent every 24 hours  vancomycin  IVPB        Vitals:  T(C): 36.6 (17 @ 04:30), Max: 36.9 (17 @ 20:00)  HR: 100 (17 @ 04:30) (100 - 113)  BP(mean): 76 (17 @ 04:30) (70 - 76)  RR: 18 (17 @ 04:30) (18 - 18)  SpO2: 97% (17 @ 04:30) (96% - 97%)    Daily Height in cm: 172.72 (05 Dec 2017 20:21)    Daily Weight in k.2 (06 Dec 2017 11:07)    Weight (kg): 57.3 ( @ 20:21)    I&O's Summary    05 Dec 2017 07:  -  06 Dec 2017 07:00  --------------------------------------------------------  IN: 1150 mL / OUT: 600 mL / NET: 550 mL    06 Dec 2017 07:  -  06 Dec 2017 12:06  --------------------------------------------------------  IN: 100 mL / OUT: 150 mL / NET: -50 mL    Physical Exam:  Appearance: no acute distress  HEENT:  mmm, no significant JVP elevation  Cardiovascular: s1s2 present, typical LVAD hum present, no LE edema  Respiratory: Lungs clear to auscultation	  Psychiatry: A & O x 3, Mood & affect appropriate  Gastrointestinal:  soft, driveline intact  Skin: No cyanosis	  Neurologic: grossly non-focal  Extremities: No cyanosis    LVAD Interrogation: HeartMate II  Pump Flow: 4.4  Pump Speed: 9000  Pulse Index: 5.3  Pump Power: 4.8  VAD Events:   Driveline evaluation:  intact  Programming Changes: No changes made    Labs:                        11.5   14.2  )-----------( 168      ( 05 Dec 2017 05:23 )             34.7         130<L>  |  92<L>  |  54<H>  ----------------------------<  142<H>  4.8   |  22  |  1.44<H>    Ca    9.4      05 Dec 2017 05:23    TPro  8.2  /  Alb  3.2<L>  /  TBili  0.3  /  DBili  x   /  AST  47<H>  /  ALT  38  /  AlkPhos  117    PT/INR - ( 05 Dec 2017 05:23 )   PT: 19.4 sec;   INR: 1.76 ratio      Lactate Dehydrogenase, Serum: 308 U/L ( @ 05:23) Heart Failure Progress Note    Interval History:  Pt reports he feels well and would like to go home.  Tolerating PO.  Making urine.    Medications:  acetaminophen   Tablet 650 milliGRAM(s) Oral every 6 hours PRN  aspirin enteric coated 81 milliGRAM(s) Oral daily  cefTRIAXone   IVPB 1 Gram(s) IV Intermittent every 24 hours  finasteride 5 milliGRAM(s) Oral daily  lidocaine   Patch 1 Patch Transdermal every 24 hours  metoprolol succinate ER 75 milliGRAM(s) Oral daily  mirtazapine 7.5 milliGRAM(s) Oral at bedtime  pantoprazole    Tablet 40 milliGRAM(s) Oral before breakfast  polyethylene glycol 3350 17 Gram(s) Oral two times a day PRN  sodium chloride 0.9% lock flush 3 milliLiter(s) IV Push every 8 hours  spironolactone 12.5 milliGRAM(s) Oral daily  vancomycin  IVPB 1000 milliGRAM(s) IV Intermittent every 24 hours  vancomycin  IVPB        Vitals:  T(C): 36.6 (17 @ 04:30), Max: 36.9 (17 @ 20:00)  HR: 100 (17 @ 04:30) (100 - 113)  BP(mean): 76 (17 @ 04:30) (70 - 76)  RR: 18 (17 @ 04:30) (18 - 18)  SpO2: 97% (17 @ 04:30) (96% - 97%)    Daily Height in cm: 172.72 (05 Dec 2017 20:21)    Daily Weight in k.2 (06 Dec 2017 11:07)    Weight (kg): 57.3 ( @ 20:21)    I&O's Summary    05 Dec 2017 07:  -  06 Dec 2017 07:00  --------------------------------------------------------  IN: 1150 mL / OUT: 600 mL / NET: 550 mL    06 Dec 2017 07:  -  06 Dec 2017 12:06  --------------------------------------------------------  IN: 100 mL / OUT: 150 mL / NET: -50 mL    Physical Exam:  Appearance: no acute distress  HEENT:  mmm, no significant JVP elevation  Cardiovascular: s1s2 present, typical LVAD hum present, no LE edema  Respiratory: Lungs clear to auscultation	  Psychiatry: A & O x 3, Mood & affect appropriate  Gastrointestinal:  soft, driveline intact  Skin: No cyanosis	  Neurologic: grossly non-focal  Extremities: No cyanosis    LVAD Interrogation: HeartMate II  Pump Flow: 4.4  Pump Speed: 9000  Pulse Index: 5.3  Pump Power: 4.8  VAD Events: None  Driveline evaluation:  intact without drainage  Programming Changes: No changes made    Labs:                 136  |  97  |  33<H>  ----------------------------<  164<H>  4.2   |  26  |  1.04    Ca    9.7      06 Dec 2017 16:24    TPro  8.2  /  Alb  3.2<L>  /  TBili  0.3  /  DBili  x   /  AST  47<H>  /  ALT  38  /  AlkPhos  117                            11.3   11.9  )-----------( 211      ( 06 Dec 2017 16:24 )             34.2     PT/INR - ( 06 Dec 2017 16:24 )   PT: 28.3 sec;   INR: 2.55 ratio         PTT - ( 06 Dec 2017 16:24 )  PTT:35.7 sec    Lactate Dehydrogenase, Serum: 308 U/L ( @ 05:23)

## 2017-12-06 NOTE — PROGRESS NOTE ADULT - SUBJECTIVE AND OBJECTIVE BOX
VITAL SIGNS    Telemetry:  SR   Vital Signs Last 24 Hrs  T(C): 36.8 (17 @ 12:30), Max: 36.9 (17 @ 20:00)  T(F): 98.2 (17 @ 12:30), Max: 98.4 (17 @ 20:00)  HR: 100 (17 @ 04:30) (100 - 113)  BP: --  RR: 18 (17 @ 12:30) (18 - 18)  SpO2: 96% (17 @ 12:30) (96% - 97%)             @ 07:01  -   @ 07:00  --------------------------------------------------------  IN: 1150 mL / OUT: 600 mL / NET: 550 mL     @ 07:01  -   @ 15:59  --------------------------------------------------------  IN: 100 mL / OUT: 550 mL / NET: -450 mL       Daily Height in cm: 172.72 (05 Dec 2017 20:21)    Daily Weight in k.2 (06 Dec 2017 11:07)  Admit Wt: Drug Dosing Weight  Height (cm): 172.72 (05 Dec 2017 20:21)  Weight (kg): 57.3 (05 Dec 2017 20:21)  BMI (kg/m2): 19.2 (05 Dec 2017 20:21)  BSA (m2): 1.68 (05 Dec 2017 20:21)        MEDICATIONS  acetaminophen   Tablet 650 milliGRAM(s) Oral every 6 hours PRN  aspirin enteric coated 81 milliGRAM(s) Oral daily  cefTRIAXone   IVPB 1 Gram(s) IV Intermittent every 24 hours  finasteride 5 milliGRAM(s) Oral daily  lidocaine   Patch 1 Patch Transdermal every 24 hours  metoprolol succinate ER 75 milliGRAM(s) Oral daily  mirtazapine 7.5 milliGRAM(s) Oral at bedtime  pantoprazole    Tablet 40 milliGRAM(s) Oral before breakfast  polyethylene glycol 3350 17 Gram(s) Oral two times a day PRN  sodium chloride 0.9% lock flush 3 milliLiter(s) IV Push every 8 hours  spironolactone 12.5 milliGRAM(s) Oral daily  vancomycin  IVPB 1000 milliGRAM(s) IV Intermittent every 24 hours  vancomycin  IVPB          PHYSICAL EXAM    Subjective: no complaints   Neurology: alert and oriented x 3, nonfocal, no gross deficits  CV : s1s1 reg no MRGS + lvad humm no JVD   Sternal Wound :  CDI , Stable healed RLQ drive line CDI   Lungs: CTA, equal chest expansion  Abdomen: soft, nontender, nondistended, positive bowel sounds, last bowel movement  17  :    voids  Extremities:   no edema,  +dp b/l , no calf tenderness b/l , warm and perfused b/l    LABS      130<L>  |  92<L>  |  54<H>  ----------------------------<  142<H>  4.8   |  22  |  1.44<H>    Ca    9.4      05 Dec 2017 05:23    TPro  8.2  /  Alb  3.2<L>  /  TBili  0.3  /  DBili  x   /  AST  47<H>  /  ALT  38  /  AlkPhos  117                                   11.5   14.2  )-----------( 168      ( 05 Dec 2017 05:23 )             34.7          PT/INR - ( 05 Dec 2017 05:23 )   PT: 19.4 sec;   INR: 1.76 ratio                  PAST MEDICAL & SURGICAL HISTORY:  Pleural effusion  Depression  CAD (coronary artery disease)  CHF (congestive heart failure)  S/P ventricular assist device  S/P TVR (tricuspid valve repair)       Physical Therapy Rec:   Home  [  ]   Home w/ PT  [ x ]  Rehab  [  ]

## 2017-12-06 NOTE — PROGRESS NOTE ADULT - ASSESSMENT
62M w/PMH CHF s/p LVAD 09/2017 on coumadin c/b UTI (off abx >2w), SIADH, CKD3, smoking who presented to [facility] ER 12/4/17  with c/o fatigue x 3-4 days.  Reports associated symptom of hiccups, dysuria, decreased  urine output, sob, cp, and decreased PO intake 2/2 abd pain when eating.  Patient was found to have (+) UA and admitted for urosepsis.   12/5 Coumadin 1.5 for tonight Seen by ID on Vancomycin 1 cortez q d and ceftriaxone 1 gram qd Blood cx negative urine cx pending Renal US ordered 500 .9 NS bolus for hypovolemia. Hold  lisinopril per HF. 62M w/PMH CHF s/p LVAD 09/2017 on coumadin c/b UTI (off abx >2w), SIADH, CKD3, smoking who presented to [facility] ER 12/4/17  with c/o fatigue x 3-4 days.  Reports associated symptom of hiccups, dysuria, decreased  urine output, sob, cp, and decreased PO intake 2/2 abd pain when eating.  Patient was found to have (+) UA and admitted for urosepsis.   12/5 Coumadin 1.5 for tonight Seen by ID on Vancomycin 1 cortez q d and ceftriaxone 1 gram qd Blood cx negative urine cx pending Renal US ordered 500 .9 NS bolus for hypovolemia. Hold  lisinopril per HF.  12/6 renal sono done

## 2017-12-06 NOTE — PROGRESS NOTE ADULT - PROBLEM SELECTOR PLAN 5
improving with IVF. f/u labs continue metoprolol  hold lisinopril for now  cont spironolactone 12.5mg daily

## 2017-12-07 LAB
ANION GAP SERPL CALC-SCNC: 10 MMOL/L — SIGNIFICANT CHANGE UP (ref 5–17)
BUN SERPL-MCNC: 27 MG/DL — HIGH (ref 7–23)
CALCIUM SERPL-MCNC: 9.5 MG/DL — SIGNIFICANT CHANGE UP (ref 8.4–10.5)
CHLORIDE SERPL-SCNC: 98 MMOL/L — SIGNIFICANT CHANGE UP (ref 96–108)
CO2 SERPL-SCNC: 27 MMOL/L — SIGNIFICANT CHANGE UP (ref 22–31)
CREAT SERPL-MCNC: 0.93 MG/DL — SIGNIFICANT CHANGE UP (ref 0.5–1.3)
GLUCOSE SERPL-MCNC: 129 MG/DL — HIGH (ref 70–99)
HCT VFR BLD CALC: 32.2 % — LOW (ref 39–50)
HGB BLD-MCNC: 10.4 G/DL — LOW (ref 13–17)
INR BLD: 2.53 RATIO — HIGH (ref 0.88–1.16)
LDH SERPL L TO P-CCNC: 280 U/L — HIGH (ref 50–242)
MCHC RBC-ENTMCNC: 30.7 PG — SIGNIFICANT CHANGE UP (ref 27–34)
MCHC RBC-ENTMCNC: 32.4 GM/DL — SIGNIFICANT CHANGE UP (ref 32–36)
MCV RBC AUTO: 94.9 FL — SIGNIFICANT CHANGE UP (ref 80–100)
PLATELET # BLD AUTO: 216 K/UL — SIGNIFICANT CHANGE UP (ref 150–400)
POTASSIUM SERPL-MCNC: 4.2 MMOL/L — SIGNIFICANT CHANGE UP (ref 3.5–5.3)
POTASSIUM SERPL-SCNC: 4.2 MMOL/L — SIGNIFICANT CHANGE UP (ref 3.5–5.3)
PROTHROM AB SERPL-ACNC: 27.8 SEC — HIGH (ref 9.8–12.7)
RBC # BLD: 3.39 M/UL — LOW (ref 4.2–5.8)
RBC # FLD: 14.8 % — HIGH (ref 10.3–14.5)
SODIUM SERPL-SCNC: 135 MMOL/L — SIGNIFICANT CHANGE UP (ref 135–145)
WBC # BLD: 11.8 K/UL — HIGH (ref 3.8–10.5)
WBC # FLD AUTO: 11.8 K/UL — HIGH (ref 3.8–10.5)

## 2017-12-07 PROCEDURE — 93750 INTERROGATION VAD IN PERSON: CPT

## 2017-12-07 PROCEDURE — 99231 SBSQ HOSP IP/OBS SF/LOW 25: CPT

## 2017-12-07 PROCEDURE — 99232 SBSQ HOSP IP/OBS MODERATE 35: CPT

## 2017-12-07 PROCEDURE — 99233 SBSQ HOSP IP/OBS HIGH 50: CPT | Mod: 25

## 2017-12-07 PROCEDURE — 76775 US EXAM ABDO BACK WALL LIM: CPT | Mod: 26

## 2017-12-07 RX ORDER — WARFARIN SODIUM 2.5 MG/1
1 TABLET ORAL ONCE
Qty: 0 | Refills: 0 | Status: COMPLETED | OUTPATIENT
Start: 2017-12-07 | End: 2017-12-07

## 2017-12-07 RX ADMIN — SODIUM CHLORIDE 3 MILLILITER(S): 9 INJECTION INTRAMUSCULAR; INTRAVENOUS; SUBCUTANEOUS at 21:48

## 2017-12-07 RX ADMIN — Medication 75 MILLIGRAM(S): at 17:55

## 2017-12-07 RX ADMIN — PANTOPRAZOLE SODIUM 40 MILLIGRAM(S): 20 TABLET, DELAYED RELEASE ORAL at 05:17

## 2017-12-07 RX ADMIN — Medication 81 MILLIGRAM(S): at 11:52

## 2017-12-07 RX ADMIN — SODIUM CHLORIDE 3 MILLILITER(S): 9 INJECTION INTRAMUSCULAR; INTRAVENOUS; SUBCUTANEOUS at 17:44

## 2017-12-07 RX ADMIN — MIRTAZAPINE 7.5 MILLIGRAM(S): 45 TABLET, ORALLY DISINTEGRATING ORAL at 21:56

## 2017-12-07 RX ADMIN — SODIUM CHLORIDE 3 MILLILITER(S): 9 INJECTION INTRAMUSCULAR; INTRAVENOUS; SUBCUTANEOUS at 05:11

## 2017-12-07 RX ADMIN — FINASTERIDE 5 MILLIGRAM(S): 5 TABLET, FILM COATED ORAL at 11:52

## 2017-12-07 RX ADMIN — CEFTRIAXONE 100 GRAM(S): 500 INJECTION, POWDER, FOR SOLUTION INTRAMUSCULAR; INTRAVENOUS at 11:53

## 2017-12-07 RX ADMIN — SPIRONOLACTONE 12.5 MILLIGRAM(S): 25 TABLET, FILM COATED ORAL at 05:17

## 2017-12-07 RX ADMIN — WARFARIN SODIUM 1 MILLIGRAM(S): 2.5 TABLET ORAL at 21:56

## 2017-12-07 NOTE — PROGRESS NOTE ADULT - PROBLEM SELECTOR PLAN 1
appears to be functioning normally  maintain current settings  cont asa 81mg daily  cont coumadin.  1mg  for INR 2.53

## 2017-12-07 NOTE — PROGRESS NOTE ADULT - SUBJECTIVE AND OBJECTIVE BOX
Subjective: No current complaints. He feels well. Lying in bed.     Medications:  acetaminophen   Tablet 650 milliGRAM(s) Oral every 6 hours PRN  aspirin enteric coated 81 milliGRAM(s) Oral daily  cefTRIAXone   IVPB 1 Gram(s) IV Intermittent every 24 hours  finasteride 5 milliGRAM(s) Oral daily  lidocaine   Patch 1 Patch Transdermal every 24 hours  metoprolol succinate ER 75 milliGRAM(s) Oral daily  mirtazapine 7.5 milliGRAM(s) Oral at bedtime  pantoprazole    Tablet 40 milliGRAM(s) Oral before breakfast  polyethylene glycol 3350 17 Gram(s) Oral two times a day PRN  sodium chloride 0.9% lock flush 3 milliLiter(s) IV Push every 8 hours  spironolactone 12.5 milliGRAM(s) Oral daily  warfarin 1 milliGRAM(s) Oral once    Physical Exam:    Vital Signs Last 24 Hrs  T(C): 36.6 (07 Dec 2017 08:16), Max: 36.8 (07 Dec 2017 04:11)  T(F): 97.9 (07 Dec 2017 08:16), Max: 98.2 (07 Dec 2017 04:11)  HR: 98 (07 Dec 2017 08:16) (98 - 104)  BP(mean): 82 (07 Dec 2017 08:16) (76 - 82)  RR: 18 (07 Dec 2017 08:16) (18 - 18)  SpO2: 96% (07 Dec 2017 08:16) (96% - 97%)    Daily       I&O's Summary    06 Dec 2017 07:01  -  07 Dec 2017 07:00  --------------------------------------------------------  IN: 660 mL / OUT: 1500 mL / NET: -840 mL    07 Dec 2017 07:01  -  07 Dec 2017 14:04  --------------------------------------------------------  IN: 480 mL / OUT: 300 mL / NET: 180 mL    General: No distress. Comfortable.  HEENT: EOM intact.  Neck: Neck supple. JVP not elevated. No masses  Chest: Clear to auscultation bilaterally  CV: Typical LVAD sounds. No distal pulses  Abdomen: Soft, non-distended, non-tender  Driveline exit site: Clean, dry, and intact  Skin: No rashes or skin breakdown  Neurology: Alert and oriented times three. Sensation intact  Psych: Affect normal    LVAD Interrogation: Heart Mate II  RPMs: 9000  Power: 5.2  Flow: 4.9  PI: 6.1  Event: None  No programming changes were made    Labs:                        10.4   11.8  )-----------( 216      ( 07 Dec 2017 05:55 )             32.2     12-07    135  |  98  |  27<H>  ----------------------------<  129<H>  4.2   |  27  |  0.93    Ca    9.5      07 Dec 2017 05:55    PT/INR - ( 07 Dec 2017 05:55 )   PT: 27.8 sec;   INR: 2.53 ratio       PTT - ( 06 Dec 2017 16:24 )  PTT:35.7 sec    Lactate Dehydrogenase, Serum: 280 U/L (12-07 @ 05:55)  Lactate Dehydrogenase, Serum: 264 U/L (12-06 @ 16:24)  Lactate Dehydrogenase, Serum: 308 U/L (12-05 @ 05:23)

## 2017-12-07 NOTE — PROGRESS NOTE ADULT - ASSESSMENT
62M w/PMH CHF s/p LVAD 09/2017 on coumadin c/b UTI (off abx >2w), SIADH, CKD3, smoking who presented to [facility] ER 12/4/17  with c/o fatigue x 3-4 days.  Reports associated symptom of hiccups, dysuria, decreased  urine output, sob, cp, and decreased PO intake 2/2 abd pain when eating.  Patient was found to have (+) UA and admitted for urosepsis.   12/5 Coumadin 1.5 for tonight Seen by ID on Vancomycin 1 cortez q d and ceftriaxone 1 gram qd Blood cx negative urine cx pending Renal US ordered 500 .9 NS bolus for hypovolemia. Hold  lisinopril per HF.  12/6 renal sono done  12/7 INR 2.53 Coumadin 1mg po given- will discuss possible change to po antibiotics and discharge home in am Friday 62M w/PMH CHF s/p LVAD 09/2017 on coumadin c/b UTI (off abx >2w), SIADH, CKD3, smoking who presented to [facility] ER 12/4/17  with c/o fatigue x 3-4 days.  Reports associated symptom of hiccups, dysuria, decreased  urine output, sob, cp, and decreased PO intake 2/2 abd pain when eating.  Patient was found to have (+) UA and admitted for urosepsis.   12/5 Coumadin 1.5 for tonight Seen by ID on Vancomycin 1 cortez q d and ceftriaxone 1 gram qd Blood cx negative urine cx pending Renal US ordered 500 .9 NS bolus for hypovolemia. Hold  lisinopril per HF.  12/6 renal sono done  12/7 INR 2.53 Coumadin 1mg po given-   spoke with Dr. Prater re: plan for antibiotics - pt. will go home tomorrow on po Levoquin

## 2017-12-07 NOTE — PROGRESS NOTE ADULT - PROBLEM SELECTOR PLAN 4
No LVAD malfunction.  Continue current RPMs  Continue ASA 81 mg daily  Continue coumadin at home dose. his INR is therapeutic (2-2.5)

## 2017-12-07 NOTE — PROGRESS NOTE ADULT - SUBJECTIVE AND OBJECTIVE BOX
VITAL SIGNS-Tele:     Vital Signs Last 24 Hrs  T(C): 36.6 (12-07-17 @ 08:16), Max: 36.8 (12-07-17 @ 04:11)  HR: 98 (12-07-17 @ 08:16) (98 - 104)  BP: --  SpO2: 96% (12-07-17 @ 08:16) (96% - 97%)         12-06 @ 07:01  -  12-07 @ 07:00  --------------------------------------------------------  IN: 660 mL / OUT: 1500 mL / NET: -840 mL    12-07 @ 07:01  -  12-07 @ 16:39  --------------------------------------------------------  IN: 480 mL / OUT: 500 mL / NET: -20 mL    Daily     Daily     CAPILLARY BLOOD GLUCOSE  POCT Blood Glucose.: 156 mg/dL (07 Dec 2017 11:36)  POCT Blood Glucose.: 140 mg/dL (07 Dec 2017 07:33)        Coumadin    [x ] YES          [  ]      NO         Reason:  lvad     PHYSICAL EXAM:  Neurology: alert and oriented x 3, nonfocal, no gross deficits  CV : S1S2  Sternal Wound :  CDI , Stable  Lungs: CTA  Abdomen: soft, nontender, nondistended, positive bowel sounds, last bowel movement       12/6  Extremities:     no edema no calf tenderness    acetaminophen   Tablet 650 milliGRAM(s) Oral every 6 hours PRN  aspirin enteric coated 81 milliGRAM(s) Oral daily  cefTRIAXone   IVPB 1 Gram(s) IV Intermittent every 24 hours  finasteride 5 milliGRAM(s) Oral daily  lidocaine   Patch 1 Patch Transdermal every 24 hours  metoprolol succinate ER 75 milliGRAM(s) Oral daily  mirtazapine 7.5 milliGRAM(s) Oral at bedtime  pantoprazole    Tablet 40 milliGRAM(s) Oral before breakfast  polyethylene glycol 3350 17 Gram(s) Oral two times a day PRN  spironolactone 12.5 milliGRAM(s) Oral daily  warfarin 1 milliGRAM(s) Oral once    Physical Therapy Rec:   Home  [  ]   Home w/ PT  [  ]  Rehab  [  ]  Discussed with Cardiothoracic Team at AM rounds.

## 2017-12-07 NOTE — PROGRESS NOTE ADULT - PROBLEM SELECTOR PLAN 4
improving  s/p hydration and  continued on abx  cont to monitor  hold lisinopril  f/u renal ultrasound for eval of nephrolithiasis  creatinine 0.9/BUN 27

## 2017-12-07 NOTE — PROGRESS NOTE ADULT - ASSESSMENT
63 yo with s/p LVAD placement admitted with fevers and a UTI. Ct negative for renal stones, will check ultrasound of kidneys to look for hydronephrosis/stones.  Of note, CT of abdomen with radiopaque linear densities in the sigmoid colon area which matches his area of pain and of unclear etiology at this point.  Blood cultures are NGTD from 12/4/17 and Urine culture is no growth, renal ultrasound with debris seen in bladder, no stones or hydronephrosis    Will need follow up with Urology- cystoscopy  Would give dose of Ceftriaxone in the morning on 12/8 then patient can be discharged home on oral Levaquin 500mg/daily for 5 days

## 2017-12-07 NOTE — PROGRESS NOTE ADULT - SUBJECTIVE AND OBJECTIVE BOX
INFECTIOUS DISEASES FOLLOW UP-- Anitra Prater  [phone]    This is a follow up note for this  62yMale with s/p LVAD placement admitted for fevers from a urinary tract source. U?A markedly abnormal but urine culture was negative (pt. given abx. prior)  Underwent renal ultrasound without stones or hydronephrosis seen  Sepsis      ROS:  CONSTITUTIONAL:  No fever, good appetite  CARDIOVASCULAR:  No chest pain or palpitations  RESPIRATORY:  No dyspnea  GASTROINTESTINAL:  No nausea, vomiting, diarrhea, or abdominal pain  GENITOURINARY:  No dysuria  NEUROLOGIC:  No headache,     Allergies    No Known Allergies    Intolerances        ANTIBIOTICS/RELEVANT:  antimicrobials  cefTRIAXone   IVPB 1 Gram(s) IV Intermittent every 24 hours    immunologic:    OTHER:  acetaminophen   Tablet 650 milliGRAM(s) Oral every 6 hours PRN  aspirin enteric coated 81 milliGRAM(s) Oral daily  finasteride 5 milliGRAM(s) Oral daily  lidocaine   Patch 1 Patch Transdermal every 24 hours  metoprolol succinate ER 75 milliGRAM(s) Oral daily  mirtazapine 7.5 milliGRAM(s) Oral at bedtime  pantoprazole    Tablet 40 milliGRAM(s) Oral before breakfast  polyethylene glycol 3350 17 Gram(s) Oral two times a day PRN  sodium chloride 0.9% lock flush 3 milliLiter(s) IV Push every 8 hours  spironolactone 12.5 milliGRAM(s) Oral daily  warfarin 1 milliGRAM(s) Oral once      Objective:  Vital Signs Last 24 Hrs  T(C): 36.6 (07 Dec 2017 17:53), Max: 36.8 (07 Dec 2017 04:11)  T(F): 97.9 (07 Dec 2017 17:53), Max: 98.2 (07 Dec 2017 04:11)  HR: 114 (07 Dec 2017 17:53) (98 - 114)  BP: --  BP(mean): 86 (07 Dec 2017 17:53) (76 - 86)  RR: 18 (07 Dec 2017 17:53) (18 - 18)  SpO2: 96% (07 Dec 2017 17:53) (96% - 97%)    PHYSICAL EXAM:  Constitutional:no acute distress  Eyes:ALONSO, EOMI  Ear/Nose/Throat: no oral lesions, 	  Respiratory: clear BL  Cardiovascular: LVAd sounds  Gastrointestinal:soft, (+) BS, no tenderness  Extremities:no e/e/c  No Lymphadenopathy  IV sites not inflammed.    LABS:                        10.4   11.8  )-----------( 216      ( 07 Dec 2017 05:55 )             32.2     12-07    135  |  98  |  27<H>  ----------------------------<  129<H>  4.2   |  27  |  0.93    Ca    9.5      07 Dec 2017 05:55      PT/INR - ( 07 Dec 2017 05:55 )   PT: 27.8 sec;   INR: 2.53 ratio         PTT - ( 06 Dec 2017 16:24 )  PTT:35.7 sec      MICROBIOLOGY:            RECENT CULTURES:  12-05 @ 01:25  .Blood Blood-Peripheral  --  --  --    No growth to date.  --  12-05 @ 01:04  .Urine Clean Catch (Midstream)  --  --  --    No growth  --  12-04 @ 13:22  .Blood Blood-Peripheral  --  --  --    No growth to date.  --      RADIOLOGY & ADDITIONAL STUDIES:    < from: US Renal (12.07.17 @ 10:31) >  IMPRESSION:     No hydronephrosis.    Distended urinary bladder with moderate mobile debris. Correlation with   urinalysis is suggested.    < end of copied text >

## 2017-12-07 NOTE — PROGRESS NOTE ADULT - ASSESSMENT
62 year old man with history of ACC/AHA stage D congestive heart failure due to a NICM with severely reduced LVEF of 10% s/p TV annuloplasty and HeartMate II LVAD placed on 9/12/2017 who presented with several days of poor PO, fevers, acute renal failure, and hyponatremia. His symptoms have improved and his appetite has improved. He is on IV antibiotics but without a clear source of infection. We are awaiting further recommendations from ID.     Please call me with questions at [phone]. Discussed with [facility] NP team.

## 2017-12-07 NOTE — PROGRESS NOTE ADULT - PROBLEM SELECTOR PLAN 3
likely urinary source  follow up cultures negative up do date   continue empiric ceftriaxone-will discuss possible conversion to po antibiotics for discharge home in am Friday  s/p empiric vancomycin likely urinary source  follow up cultures negative up do date   continue empiric ceftriaxone-ID will change to po Levoquin in am & be discharged home  s/p empiric vancomycin

## 2017-12-08 ENCOUNTER — TRANSCRIPTION ENCOUNTER (OUTPATIENT)
Age: 62
End: 2017-12-08

## 2017-12-08 ENCOUNTER — INPATIENT (INPATIENT)
Facility: HOSPITAL | Age: 62
LOS: 1 days | Discharge: HOME | DRG: 872 | End: 2017-12-10
Attending: THORACIC SURGERY (CARDIOTHORACIC VASCULAR SURGERY) | Admitting: THORACIC SURGERY (CARDIOTHORACIC VASCULAR SURGERY)
Payer: MEDICAID

## 2017-12-08 VITALS — OXYGEN SATURATION: 100 % | RESPIRATION RATE: 18 BRPM | TEMPERATURE: 98 F

## 2017-12-08 VITALS — RESPIRATION RATE: 18 BRPM | OXYGEN SATURATION: 97 % | HEART RATE: 102 BPM | TEMPERATURE: 98 F

## 2017-12-08 DIAGNOSIS — Z98.890 OTHER SPECIFIED POSTPROCEDURAL STATES: Chronic | ICD-10-CM

## 2017-12-08 DIAGNOSIS — Z95.811 PRESENCE OF HEART ASSIST DEVICE: Chronic | ICD-10-CM

## 2017-12-08 LAB
ANION GAP SERPL CALC-SCNC: 14 MMOL/L — SIGNIFICANT CHANGE UP (ref 5–17)
BUN SERPL-MCNC: 20 MG/DL — SIGNIFICANT CHANGE UP (ref 7–23)
CALCIUM SERPL-MCNC: 9.3 MG/DL — SIGNIFICANT CHANGE UP (ref 8.4–10.5)
CHLORIDE SERPL-SCNC: 98 MMOL/L — SIGNIFICANT CHANGE UP (ref 96–108)
CO2 SERPL-SCNC: 25 MMOL/L — SIGNIFICANT CHANGE UP (ref 22–31)
CREAT SERPL-MCNC: 0.9 MG/DL — SIGNIFICANT CHANGE UP (ref 0.5–1.3)
GLUCOSE SERPL-MCNC: 139 MG/DL — HIGH (ref 70–99)
HCT VFR BLD CALC: 33.8 % — LOW (ref 39–50)
HGB BLD-MCNC: 11 G/DL — LOW (ref 13–17)
INR BLD: 2.18 RATIO — HIGH (ref 0.88–1.16)
LDH SERPL L TO P-CCNC: 287 U/L — HIGH (ref 50–242)
MCHC RBC-ENTMCNC: 30.7 PG — SIGNIFICANT CHANGE UP (ref 27–34)
MCHC RBC-ENTMCNC: 32.6 GM/DL — SIGNIFICANT CHANGE UP (ref 32–36)
MCV RBC AUTO: 94.2 FL — SIGNIFICANT CHANGE UP (ref 80–100)
PLATELET # BLD AUTO: 220 K/UL — SIGNIFICANT CHANGE UP (ref 150–400)
POTASSIUM SERPL-MCNC: 4.3 MMOL/L — SIGNIFICANT CHANGE UP (ref 3.5–5.3)
POTASSIUM SERPL-SCNC: 4.3 MMOL/L — SIGNIFICANT CHANGE UP (ref 3.5–5.3)
PROTHROM AB SERPL-ACNC: 24.1 SEC — HIGH (ref 9.8–12.7)
RBC # BLD: 3.59 M/UL — LOW (ref 4.2–5.8)
RBC # FLD: 14.8 % — HIGH (ref 10.3–14.5)
SODIUM SERPL-SCNC: 137 MMOL/L — SIGNIFICANT CHANGE UP (ref 135–145)
WBC # BLD: 11.9 K/UL — HIGH (ref 3.8–10.5)
WBC # FLD AUTO: 11.9 K/UL — HIGH (ref 3.8–10.5)

## 2017-12-08 PROCEDURE — 93750 INTERROGATION VAD IN PERSON: CPT

## 2017-12-08 PROCEDURE — 99232 SBSQ HOSP IP/OBS MODERATE 35: CPT

## 2017-12-08 PROCEDURE — 99233 SBSQ HOSP IP/OBS HIGH 50: CPT | Mod: 25

## 2017-12-08 PROCEDURE — 99285 EMERGENCY DEPT VISIT HI MDM: CPT | Mod: 25

## 2017-12-08 PROCEDURE — 99238 HOSP IP/OBS DSCHRG MGMT 30/<: CPT

## 2017-12-08 RX ORDER — WARFARIN SODIUM 2.5 MG/1
1 TABLET ORAL
Qty: 30 | Refills: 0 | OUTPATIENT
Start: 2017-12-08 | End: 2018-01-07

## 2017-12-08 RX ORDER — WARFARIN SODIUM 2.5 MG/1
2 TABLET ORAL ONCE
Qty: 0 | Refills: 0 | Status: COMPLETED | OUTPATIENT
Start: 2017-12-08 | End: 2017-12-08

## 2017-12-08 RX ORDER — CIPROFLOXACIN LACTATE 400MG/40ML
1 VIAL (ML) INTRAVENOUS
Qty: 5 | Refills: 0 | OUTPATIENT
Start: 2017-12-08 | End: 2017-12-13

## 2017-12-08 RX ORDER — FINASTERIDE 5 MG/1
1 TABLET, FILM COATED ORAL
Qty: 30 | Refills: 0 | OUTPATIENT
Start: 2017-12-08 | End: 2018-01-07

## 2017-12-08 RX ORDER — SPIRONOLACTONE 25 MG/1
0.5 TABLET, FILM COATED ORAL
Qty: 0 | Refills: 0 | COMMUNITY
Start: 2017-12-08

## 2017-12-08 RX ORDER — ACETAMINOPHEN 500 MG
2 TABLET ORAL
Qty: 0 | Refills: 0 | DISCHARGE
Start: 2017-12-08

## 2017-12-08 RX ADMIN — PANTOPRAZOLE SODIUM 40 MILLIGRAM(S): 20 TABLET, DELAYED RELEASE ORAL at 05:46

## 2017-12-08 RX ADMIN — Medication 75 MILLIGRAM(S): at 18:54

## 2017-12-08 RX ADMIN — SODIUM CHLORIDE 3 MILLILITER(S): 9 INJECTION INTRAMUSCULAR; INTRAVENOUS; SUBCUTANEOUS at 05:01

## 2017-12-08 RX ADMIN — SPIRONOLACTONE 12.5 MILLIGRAM(S): 25 TABLET, FILM COATED ORAL at 05:46

## 2017-12-08 RX ADMIN — Medication 81 MILLIGRAM(S): at 12:31

## 2017-12-08 RX ADMIN — CEFTRIAXONE 100 GRAM(S): 500 INJECTION, POWDER, FOR SOLUTION INTRAMUSCULAR; INTRAVENOUS at 12:31

## 2017-12-08 RX ADMIN — WARFARIN SODIUM 2 MILLIGRAM(S): 2.5 TABLET ORAL at 20:17

## 2017-12-08 RX ADMIN — FINASTERIDE 5 MILLIGRAM(S): 5 TABLET, FILM COATED ORAL at 12:31

## 2017-12-08 RX ADMIN — SODIUM CHLORIDE 3 MILLILITER(S): 9 INJECTION INTRAMUSCULAR; INTRAVENOUS; SUBCUTANEOUS at 12:31

## 2017-12-08 NOTE — DISCHARGE NOTE ADULT - PATIENT PORTAL LINK FT
“You can access the Follow[facility] Patient Portal, offered by [facility], by registering with the following website: [email or web address]”

## 2017-12-08 NOTE — PROGRESS NOTE ADULT - SUBJECTIVE AND OBJECTIVE BOX
INFECTIOUS DISEASES FOLLOW UP-- Anitra Prater  [phone]    This is a follow up note for this  62yMale with s/p LVAD, admitted with fevers, UTI - improved and possibly going home later today        ROS:  CONSTITUTIONAL:  No fever, good appetite  CARDIOVASCULAR:  No chest pain or palpitations  RESPIRATORY:  No dyspnea  GASTROINTESTINAL:  No nausea, vomiting, diarrhea, or abdominal pain  GENITOURINARY:  No dysuria  NEUROLOGIC:  No headache,     Allergies    No Known Allergies    Intolerances        ANTIBIOTICS/RELEVANT:  antimicrobials  cefTRIAXone   IVPB 1 Gram(s) IV Intermittent every 24 hours    immunologic:    OTHER:  acetaminophen   Tablet 650 milliGRAM(s) Oral every 6 hours PRN  aspirin enteric coated 81 milliGRAM(s) Oral daily  finasteride 5 milliGRAM(s) Oral daily  lidocaine   Patch 1 Patch Transdermal every 24 hours  metoprolol succinate ER 75 milliGRAM(s) Oral daily  mirtazapine 7.5 milliGRAM(s) Oral at bedtime  pantoprazole    Tablet 40 milliGRAM(s) Oral before breakfast  polyethylene glycol 3350 17 Gram(s) Oral two times a day PRN  sodium chloride 0.9% lock flush 3 milliLiter(s) IV Push every 8 hours  spironolactone 12.5 milliGRAM(s) Oral daily      Objective:  Vital Signs Last 24 Hrs  T(C): 36.3 (08 Dec 2017 09:50), Max: 37.1 (08 Dec 2017 04:50)  T(F): 97.4 (08 Dec 2017 09:50), Max: 98.8 (08 Dec 2017 04:50)  HR: 104 (08 Dec 2017 09:50) (99 - 114)  BP: --  BP(mean): 84 (08 Dec 2017 09:50) (78 - 86)  RR: 18 (08 Dec 2017 09:50) (18 - 18)  SpO2: 97% (08 Dec 2017 09:50) (96% - 98%)    PHYSICAL EXAM:  Constitutional:no acute distress  Eyes:ALONSO, EOMI  Ear/Nose/Throat: no oral lesions, 	  Respiratory: clear BL  Cardiovascular: LVAD sounds  LVAD dressing dry and intact  Gastrointestinal:soft, (+) BS, no tenderness  Extremities:no e/e/c  No Lymphadenopathy  IV sites not inflammed.    LABS:                        11.0   11.9  )-----------( 220      ( 08 Dec 2017 09:18 )             33.8     12-08    137  |  98  |  20  ----------------------------<  139<H>  4.3   |  25  |  0.90    Ca    9.3      08 Dec 2017 09:18      PT/INR - ( 08 Dec 2017 09:18 )   PT: 24.1 sec;   INR: 2.18 ratio         PTT - ( 06 Dec 2017 16:24 )  PTT:35.7 sec      MICROBIOLOGY:            RECENT CULTURES:  12-05 @ 01:25  .Blood Blood-Peripheral  --  --  --    No growth to date.  --  12-05 @ 01:04  .Urine Clean Catch (Midstream)  --  --  --    No growth  --  12-04 @ 13:22  .Blood Blood-Peripheral  --  --  --    No growth to date.  --      RADIOLOGY & ADDITIONAL STUDIES:  < from: US Renal (12.07.17 @ 10:31) >  IMPRESSION:     No hydronephrosis.    Distended urinary bladder with moderate mobile debris. Correlation with   urinalysis is suggested.    < end of copied text >

## 2017-12-08 NOTE — PROGRESS NOTE ADULT - ASSESSMENT
62 year old man with history of ACC/AHA stage D congestive heart failure due to a NICM with severely reduced LVEF of 10% s/p TV annuloplasty and HeartMate II LVAD placed on 9/12/2017 who presented with several days of poor PO, fevers, acute renal failure, and hyponatremia. His symptoms have improved and his appetite has improved. He is on IV antibiotics but without a clear source of infection. Plan for discharge today with outpatient follow-up    Please call me with questions at [phone]. Discussed with [facility] NP team.

## 2017-12-08 NOTE — DISCHARGE NOTE ADULT - MEDICATION SUMMARY - MEDICATIONS TO TAKE
I will START or STAY ON the medications listed below when I get home from the hospital:    finasteride 5 mg oral tablet  -- 1 tab(s) by mouth once a day  -- Indication: For Prostate    spironolactone 25 mg oral tablet  -- 0.5 tab(s) by mouth once a day  -- Indication: For water pill    aspirin 81 mg oral delayed release tablet  -- 1 tab(s) by mouth once a day  -- Indication: For CAD (coronary artery disease)    oxyCODONE 5 mg oral tablet  -- 1 tab(s) by mouth every 6 hours, As needed, Severe Pain (7 - 10) MDD:4 tabs  -- Indication: For Pain    acetaminophen 325 mg oral tablet  -- 2 tab(s) by mouth every 6 hours, As needed, For Temp greater than 38.5 C (101.3 F)  -- Indication: For Pain/fever    lisinopril 2.5 mg oral tablet  -- 3 tab(s) by mouth once a day (at bedtime)  -- Indication: For CHF (congestive heart failure)    Coumadin 2 mg oral tablet  -- 1 tab(s) by mouth once a day (at bedtime)   -- Do not take this drug if you are pregnant.  It is very important that you take or use this exactly as directed.  Do not skip doses or discontinue unless directed by your doctor.  Obtain medical advice before taking any non-prescription drugs as some may affect the action of this medication.    -- Indication: For LVAD (left ventricular assist device) present    mirtazapine 7.5 mg oral tablet  -- 1 tab(s) by mouth once a day (at bedtime)  -- Indication: For mood    metoprolol succinate 50 mg oral tablet, extended release  -- 1 tab(s) by mouth once a day  -- Indication: For LVAD (left ventricular assist device) present    polyethylene glycol 3350 oral powder for reconstitution  -- 17 gram(s) by mouth once a day (at bedtime), As Needed -for constipation   -- Indication: For Constipation    pantoprazole 40 mg oral delayed release tablet  -- 1 tab(s) by mouth once a day (before a meal)  -- Indication: For Heartburn    Levaquin 500 mg oral tablet  -- 1 tab(s) by mouth once a day x 5 days   -- Avoid prolonged or excessive exposure to direct and/or artificial sunlight while taking this medication.  Do not take dairy products, antacids, or iron preparations within one hour of this medication.  Finish all this medication unless otherwise directed by prescriber.  May cause drowsiness or dizziness.  Medication should be taken with plenty of water.    -- Indication: For UTI (urinary tract infection) I will START or STAY ON the medications listed below when I get home from the hospital:    finasteride 5 mg oral tablet  -- 1 tab(s) by mouth once a day  -- Indication: For UTI (urinary tract infection)    spironolactone 25 mg oral tablet  -- 0.5 tab(s) by mouth once a day  -- Indication: For CHF (congestive heart failure)    aspirin 81 mg oral delayed release tablet  -- 1 tab(s) by mouth once a day  -- Indication: For CAD (coronary artery disease)    oxyCODONE 5 mg oral tablet  -- 1 tab(s) by mouth every 6 hours, As needed, Severe Pain (7 - 10) MDD:4 tabs  -- Indication: For Pain    acetaminophen 325 mg oral tablet  -- 2 tab(s) by mouth every 6 hours, As needed, For Temp greater than 38.5 C (101.3 F)  -- Indication: For Pain/fever    lisinopril 2.5 mg oral tablet  -- 3 tab(s) by mouth once a day (at bedtime)  -- Indication: For CHF (congestive heart failure)    Coumadin 2 mg oral tablet  -- 1 tab(s) by mouth once a day (at bedtime)   -- Do not take this drug if you are pregnant.  It is very important that you take or use this exactly as directed.  Do not skip doses or discontinue unless directed by your doctor.  Obtain medical advice before taking any non-prescription drugs as some may affect the action of this medication.    -- Indication: For LVAD (left ventricular assist device) present    mirtazapine 7.5 mg oral tablet  -- 1 tab(s) by mouth once a day (at bedtime)  -- Indication: For mood    metoprolol succinate 50 mg oral tablet, extended release  -- 1 tab(s) by mouth once a day  -- Indication: For LVAD (left ventricular assist device) present    polyethylene glycol 3350 oral powder for reconstitution  -- 17 gram(s) by mouth once a day (at bedtime), As Needed -for constipation   -- Indication: For Constipation    pantoprazole 40 mg oral delayed release tablet  -- 1 tab(s) by mouth once a day (before a meal)  -- Indication: For Heartburn    Levaquin 500 mg oral tablet  -- 1 tab(s) by mouth once a day x 5 days   -- Avoid prolonged or excessive exposure to direct and/or artificial sunlight while taking this medication.  Do not take dairy products, antacids, or iron preparations within one hour of this medication.  Finish all this medication unless otherwise directed by prescriber.  May cause drowsiness or dizziness.  Medication should be taken with plenty of water.    -- Indication: For UTI (urinary tract infection)

## 2017-12-08 NOTE — DISCHARGE NOTE ADULT - OTHER SIGNIFICANT FINDINGS
Telemetry  Vital Signs Last 24 Hrs  T(C): 36.3 (08 Dec 2017 09:50), Max: 37.1 (08 Dec 2017 04:50)  T(F): 97.4 (08 Dec 2017 09:50), Max: 98.8 (08 Dec 2017 04:50)  HR: 104 (08 Dec 2017 09:50) (99 - 114)  BP: --  BP(mean): 84 (08 Dec 2017 09:50) (78 - 86)  RR: 18 (08 Dec 2017 09:50) (18 - 18)  SpO2: 97% (08 Dec 2017 09:50) (96% - 98%)    Subjective: no complaints   Neurology: alert and oriented x 3, nonfocal, no gross deficits  CV : s1s1 reg no MRGS no jvd + lvad humm   Sternal Wound :  healed lvad drive line CDI   Lungs: CTA, equal chest expansion   Abdomen: soft, nontender, nondistended, positive bowel sounds, last bowel movement  12/6/17  :    voids  Extremities:   no  edema,  +dp b/l , no calt tenderness b/l , warm and perfused b/l

## 2017-12-08 NOTE — PROGRESS NOTE ADULT - PROBLEM SELECTOR PLAN 1
Plan for 5 days of oral levofloxacin  Will need outpatient urology evaluation with possible cystoscopy  Cultures negative to date. Unclear source

## 2017-12-08 NOTE — PROGRESS NOTE ADULT - PROBLEM SELECTOR PLAN 5
Continue metoprolol.  Resume home ACE-I.   Continue spironolactone 12.5mg daily  MAP goal 70-80 mmHg.

## 2017-12-08 NOTE — PROGRESS NOTE ADULT - SUBJECTIVE AND OBJECTIVE BOX
Subjective: No current complaints. He says that he feels well with no abdominal pain and no fevers.     Medications:  acetaminophen   Tablet 650 milliGRAM(s) Oral every 6 hours PRN  aspirin enteric coated 81 milliGRAM(s) Oral daily  cefTRIAXone   IVPB 1 Gram(s) IV Intermittent every 24 hours  finasteride 5 milliGRAM(s) Oral daily  lidocaine   Patch 1 Patch Transdermal every 24 hours  metoprolol succinate ER 75 milliGRAM(s) Oral daily  mirtazapine 7.5 milliGRAM(s) Oral at bedtime  pantoprazole    Tablet 40 milliGRAM(s) Oral before breakfast  polyethylene glycol 3350 17 Gram(s) Oral two times a day PRN  sodium chloride 0.9% lock flush 3 milliLiter(s) IV Push every 8 hours  spironolactone 12.5 milliGRAM(s) Oral daily    Physical Exam:    Vital Signs Last 24 Hrs  T(C): 36.3 (08 Dec 2017 09:50), Max: 37.1 (08 Dec 2017 04:50)  T(F): 97.4 (08 Dec 2017 09:50), Max: 98.8 (08 Dec 2017 04:50)  HR: 104 (08 Dec 2017 09:50) (99 - 114)  BP(mean): 84 (08 Dec 2017 09:50) (78 - 86)  RR: 18 (08 Dec 2017 09:50) (18 - 18)  SpO2: 97% (08 Dec 2017 09:50) (96% - 98%)    Daily Weight in k (08 Dec 2017 07:41)    I&O's Summary    07 Dec 2017 07:  -  08 Dec 2017 07:00  --------------------------------------------------------  IN: 840 mL / OUT: 900 mL / NET: -60 mL    08 Dec 2017 07:  -  08 Dec 2017 12:14  --------------------------------------------------------  IN: 420 mL / OUT: 300 mL / NET: 120 mL    General: No distress. Comfortable.  HEENT: EOM intact.  Neck: Neck supple. JVP not elevated. No masses  Chest: Clear to auscultation bilaterally  CV: Typical LVAD sounds. No distal pulses  Abdomen: Soft, non-distended, non-tender  Driveline exit site: Clean, dry, and intact  Skin: No rashes or skin breakdown  Neurology: Alert and oriented times three. Sensation intact  Psych: Affect normal    LVAD Interrogation: Heart Mate II  RPMs: 9000  Power: 5.1  Flow: 4.4  PI: 5.9  Event: None  No programming changes were made    Labs:                        11.0   11.9  )-----------( 220      ( 08 Dec 2017 09:18 )             33.8         137  |  98  |  20  ----------------------------<  139<H>  4.3   |  25  |  0.90    Ca    9.3      08 Dec 2017 09:18      PT/INR - ( 08 Dec 2017 09:18 )   PT: 24.1 sec;   INR: 2.18 ratio      PTT - ( 06 Dec 2017 16:24 )  PTT:35.7 sec    Lactate Dehydrogenase, Serum: 287 U/L ( @ 09:18)  Lactate Dehydrogenase, Serum: 280 U/L ( @ 05:55)  Lactate Dehydrogenase, Serum: 264 U/L ( @ 16:24)

## 2017-12-08 NOTE — DISCHARGE NOTE ADULT - HOSPITAL COURSE
62M w/PMH CHF s/p LVAD 09/2017 on coumadin c/b UTI (off abx >2w), SIADH, CKD3, smoking who presented to [facility] ER 12/4/17  with c/o fatigue x 3-4 days.  Reports associated symptom of hiccups, dysuria, decreased  urine output, sob, cp, and decreased PO intake 2/2 abd pain when eating.  Patient was found to have (+) UA and admitted for urosepsis.   12/5 Coumadin 1.5 for tonight Seen by ID on Vancomycin 1 cortez q d and ceftriaxone 1 gram qd Blood cx negative urine cx pending Renal US ordered 500 .9 NS bolus for hypovolemia. Hold  lisinopril per HF.  12/6 renal sono done  12/7 INR 2.53 Coumadin 1mg po given-  12/8 medically stable discharged home with home care and antibiotic, home  INR  draws to resume on 12/11 LVAD clinic to follow up AC and INRs pt has outpatient apt at LVAD clinic 12/14/17 1 pm

## 2017-12-08 NOTE — DISCHARGE NOTE ADULT - CARE PLAN
Principal Discharge DX:	LVAD (left ventricular assist device) present  Goal:	you will have no complications  Instructions for follow-up, activity and diet:	follow up previous lvad instructions   You resuming home INR draws  to satrt on Mon 12/11/17  Secondary Diagnosis:	Sepsis, due to unspecified organism  Goal:	no new infection  Instructions for follow-up, activity and diet:	Take antibiotic Levaquin for 5 days  See urologist Dr Gary Goldberg to have study done called cystoscopy (  make your own appointment number provided)

## 2017-12-08 NOTE — DISCHARGE NOTE ADULT - CARE PROVIDER_API CALL
Tamanna Mcclendon (NP; RN), NP in Adult Health  [address] [place] [address]  Phone: [phone]  Fax: [phone]    Goldberg, Gary D (MD), Urology  [address] [place] [address]  Phone: [phone]  Fax: [phone]

## 2017-12-08 NOTE — ED ADULT NURSE NOTE - OBJECTIVE STATEMENT
Pt is a 62YOM hx of CHF, CAD, depression w/ LVAD device [facility] s[facility]p discharged from [facility] ED today ~2200 for inability to get inside of home because pt did not have house bloom. Pt has no complaints at this team. LVAD team contacted. Social Admit

## 2017-12-08 NOTE — DISCHARGE NOTE ADULT - CARE PROVIDERS DIRECT ADDRESSES
,DirectAddress_Unknown,[email or web address]

## 2017-12-08 NOTE — DISCHARGE NOTE ADULT - NS AS ACTIVITY OBS
Walking-Outdoors allowed/Do not make important decisions/Do not drive or operate machinery/Walking-Indoors allowed/Stairs allowed/No Heavy lifting/straining

## 2017-12-08 NOTE — PROGRESS NOTE ADULT - ASSESSMENT
63 yo with s/p LVAD placement admitted with fevers and a UTI. Ct negative for renal stones, will check ultrasound of kidneys to look for hydronephrosis/stones.  Of note, CT of abdomen with radiopaque linear densities in the sigmoid colon area which matches his area of pain and of unclear etiology at this point.  Blood cultures are NGTD from 12/4/17 and Urine culture is no growth, renal ultrasound with debris seen in bladder, no stones or hydronephrosis    Will need follow up with Urology- cystoscopy for evaluation ?debris seen in the bladder  Would give dose of Ceftriaxone in the morning on 12/8 then patient can be discharged home on oral Levaquin 500mg/daily for 5 days    if issues arise [phone]

## 2017-12-08 NOTE — CHART NOTE - NSCHARTNOTEFT_GEN_A_CORE
Source: Patient [ X]    Family [ ]     other [ X] RN, Medical record    Pt seen for Malnutrition follow up. Pt seen, reports a good appetite states he has been drinking Ensure enlive x1 daily. Pt has limited interest in in-depth diet education however was amenable to brief review of HF and coumadin diet education. Pt denies GI distress, states he "wants to go home"     Per chart, Pt with Chronic systolic HF, s/p Heart Mate II placed 9/12/2017, admits now with RASHAWN, Hyponatremia, and sepsis ? related to UTI. S/p renal US for RASHAWN was negative.     Diet : Consistent CHO, DASH, Ensure enlive x2      Patient reports no GI distress.    PO intake:  0-50%     Source for PO intake [ X] Patient [ ] family [ X] chart [ ] staff [ ] other    Current Weight: Weight (kg): 57.3 (12-05 @ 20:21):   % Weight Change    Pertinent Medications: MEDICATIONS  (STANDING):  aspirin enteric coated 81 milliGRAM(s) Oral daily  cefTRIAXone   IVPB 1 Gram(s) IV Intermittent every 24 hours  finasteride 5 milliGRAM(s) Oral daily  lidocaine   Patch 1 Patch Transdermal every 24 hours  metoprolol succinate ER 75 milliGRAM(s) Oral daily  mirtazapine 7.5 milliGRAM(s) Oral at bedtime  pantoprazole    Tablet 40 milliGRAM(s) Oral before breakfast  sodium chloride 0.9% lock flush 3 milliLiter(s) IV Push every 8 hours  spironolactone 12.5 milliGRAM(s) Oral daily    MEDICATIONS  (PRN):  acetaminophen   Tablet 650 milliGRAM(s) Oral every 6 hours PRN For Temp greater than 38.5 C (101.3 F)  polyethylene glycol 3350 17 Gram(s) Oral two times a day PRN Constipation    Pertinent Labs:  Hgb/Hct:11.0/33.8-low, Na:137, K:4.3, Cl:98, BUN:20, Cr:0.90, Glucose:139-high, Ca:9.3, BG levels: 12/7: 140-156    Skin: intact     Estimated Needs:   [X ] no change since previous assessment  [ ] recalculated:       Previous Nutrition Diagnosis:   [ X] Malnutrition          Nutrition Diagnosis is [X ] ongoing  [ ] resolved [ ] not applicable          New Nutrition Diagnosis: [X ] not applicable    Recommend    1. Provide food preferences as requested by Pt/family within diet restrictions    2. Encourage PO intake during meal times   3. Brief review of HF and coumadin education   4. Continue Ensure enlive x2       Monitoring and Evaluation:     [ X] PO intake [X ] Tolerance to diet prescription [ X] weights [ X] follow up per protocol    [X ] other: RD remains available Sarah Siegler RD. Pager #[phone]

## 2017-12-08 NOTE — PROGRESS NOTE ADULT - PROBLEM SELECTOR PLAN 4
No LVAD malfunction.  Continue current RPMs  Continue ASA 81 mg daily.  Continue coumadin at home dose. his INR is therapeutic (2-2.5)

## 2017-12-08 NOTE — PROGRESS NOTE ADULT - PROVIDER SPECIALTY LIST ADULT
CT Surgery
CT Surgery
Heart Failure
Infectious Disease
CT Surgery

## 2017-12-08 NOTE — DISCHARGE NOTE ADULT - PLAN OF CARE
no new infection Take antibiotic Levaquin for 5 days  See urologist Dr Gary Goldberg to have study done called cystoscopy (  make your own appointment number provided) you will have no complications follow up previous lvad instructions   You resuming home INR draws  to satrt on Mon 12/11/17

## 2017-12-08 NOTE — DISCHARGE NOTE ADULT - MEDICATION SUMMARY - MEDICATIONS TO STOP TAKING
I will STOP taking the medications listed below when I get home from the hospital:    ciprofloxacin 500 mg oral tablet  -- 1 tab(s) by mouth every 12 hours    Coumadin 5 mg oral tablet  -- 1 tab(s) by mouth once a day   -- Do not take this drug if you are pregnant.  It is very important that you take or use this exactly as directed.  Do not skip doses or discontinue unless directed by your doctor.  Obtain medical advice before taking any non-prescription drugs as some may affect the action of this medication.    Pyridium 200 mg oral tablet  -- 1 tab(s) by mouth 3 times a day (after meals)   -- May discolor urine or feces.  Medication should be taken with plenty of water.  Take with food or milk.

## 2017-12-09 DIAGNOSIS — Z95.811 PRESENCE OF HEART ASSIST DEVICE: ICD-10-CM

## 2017-12-09 LAB
ANION GAP SERPL CALC-SCNC: 12 MMOL/L — SIGNIFICANT CHANGE UP (ref 5–17)
APTT BLD: 31 SEC — SIGNIFICANT CHANGE UP (ref 27.5–37.4)
BUN SERPL-MCNC: 22 MG/DL — SIGNIFICANT CHANGE UP (ref 7–23)
CALCIUM SERPL-MCNC: 9.5 MG/DL — SIGNIFICANT CHANGE UP (ref 8.4–10.5)
CHLORIDE SERPL-SCNC: 98 MMOL/L — SIGNIFICANT CHANGE UP (ref 96–108)
CO2 SERPL-SCNC: 26 MMOL/L — SIGNIFICANT CHANGE UP (ref 22–31)
CREAT SERPL-MCNC: 0.93 MG/DL — SIGNIFICANT CHANGE UP (ref 0.5–1.3)
CULTURE RESULTS: SIGNIFICANT CHANGE UP
CULTURE RESULTS: SIGNIFICANT CHANGE UP
GLUCOSE SERPL-MCNC: 217 MG/DL — HIGH (ref 70–99)
HCT VFR BLD CALC: 31.6 % — LOW (ref 39–50)
HGB BLD-MCNC: 10.4 G/DL — LOW (ref 13–17)
INR BLD: 1.87 RATIO — HIGH (ref 0.88–1.16)
LDH SERPL L TO P-CCNC: 305 U/L — HIGH (ref 50–242)
MCHC RBC-ENTMCNC: 31.1 PG — SIGNIFICANT CHANGE UP (ref 27–34)
MCHC RBC-ENTMCNC: 33 GM/DL — SIGNIFICANT CHANGE UP (ref 32–36)
MCV RBC AUTO: 94.4 FL — SIGNIFICANT CHANGE UP (ref 80–100)
PLATELET # BLD AUTO: 285 K/UL — SIGNIFICANT CHANGE UP (ref 150–400)
POTASSIUM SERPL-MCNC: 4.5 MMOL/L — SIGNIFICANT CHANGE UP (ref 3.5–5.3)
POTASSIUM SERPL-SCNC: 4.5 MMOL/L — SIGNIFICANT CHANGE UP (ref 3.5–5.3)
PROTHROM AB SERPL-ACNC: 20.7 SEC — HIGH (ref 9.8–12.7)
RBC # BLD: 3.35 M/UL — LOW (ref 4.2–5.8)
RBC # FLD: 14.8 % — HIGH (ref 10.3–14.5)
SODIUM SERPL-SCNC: 136 MMOL/L — SIGNIFICANT CHANGE UP (ref 135–145)
SPECIMEN SOURCE: SIGNIFICANT CHANGE UP
SPECIMEN SOURCE: SIGNIFICANT CHANGE UP
WBC # BLD: 11 K/UL — HIGH (ref 3.8–10.5)
WBC # FLD AUTO: 11 K/UL — HIGH (ref 3.8–10.5)

## 2017-12-09 PROCEDURE — 99221 1ST HOSP IP/OBS SF/LOW 40: CPT

## 2017-12-09 RX ORDER — WARFARIN SODIUM 2.5 MG/1
2 TABLET ORAL ONCE
Qty: 0 | Refills: 0 | Status: COMPLETED | OUTPATIENT
Start: 2017-12-09 | End: 2017-12-09

## 2017-12-09 RX ORDER — SODIUM CHLORIDE 9 MG/ML
3 INJECTION INTRAMUSCULAR; INTRAVENOUS; SUBCUTANEOUS EVERY 8 HOURS
Qty: 0 | Refills: 0 | Status: DISCONTINUED | OUTPATIENT
Start: 2017-12-09 | End: 2017-12-09

## 2017-12-09 RX ORDER — ASPIRIN/CALCIUM CARB/MAGNESIUM 324 MG
81 TABLET ORAL DAILY
Qty: 0 | Refills: 0 | Status: DISCONTINUED | OUTPATIENT
Start: 2017-12-09 | End: 2017-12-10

## 2017-12-09 RX ORDER — LISINOPRIL 2.5 MG/1
7.5 TABLET ORAL AT BEDTIME
Qty: 0 | Refills: 0 | Status: DISCONTINUED | OUTPATIENT
Start: 2017-12-09 | End: 2017-12-10

## 2017-12-09 RX ORDER — PANTOPRAZOLE SODIUM 20 MG/1
40 TABLET, DELAYED RELEASE ORAL
Qty: 0 | Refills: 0 | Status: DISCONTINUED | OUTPATIENT
Start: 2017-12-09 | End: 2017-12-10

## 2017-12-09 RX ORDER — METOPROLOL TARTRATE 50 MG
50 TABLET ORAL DAILY
Qty: 0 | Refills: 0 | Status: DISCONTINUED | OUTPATIENT
Start: 2017-12-09 | End: 2017-12-10

## 2017-12-09 RX ORDER — MIRTAZAPINE 45 MG/1
7.5 TABLET, ORALLY DISINTEGRATING ORAL AT BEDTIME
Qty: 0 | Refills: 0 | Status: DISCONTINUED | OUTPATIENT
Start: 2017-12-09 | End: 2017-12-10

## 2017-12-09 RX ORDER — SPIRONOLACTONE 25 MG/1
12.5 TABLET, FILM COATED ORAL DAILY
Qty: 0 | Refills: 0 | Status: DISCONTINUED | OUTPATIENT
Start: 2017-12-09 | End: 2017-12-10

## 2017-12-09 RX ORDER — FINASTERIDE 5 MG/1
5 TABLET, FILM COATED ORAL DAILY
Qty: 0 | Refills: 0 | Status: DISCONTINUED | OUTPATIENT
Start: 2017-12-09 | End: 2017-12-10

## 2017-12-09 RX ORDER — POLYETHYLENE GLYCOL 3350 17 G/17G
17 POWDER, FOR SOLUTION ORAL AT BEDTIME
Qty: 0 | Refills: 0 | Status: DISCONTINUED | OUTPATIENT
Start: 2017-12-09 | End: 2017-12-10

## 2017-12-09 RX ADMIN — MIRTAZAPINE 7.5 MILLIGRAM(S): 45 TABLET, ORALLY DISINTEGRATING ORAL at 22:28

## 2017-12-09 RX ADMIN — FINASTERIDE 5 MILLIGRAM(S): 5 TABLET, FILM COATED ORAL at 12:43

## 2017-12-09 RX ADMIN — PANTOPRAZOLE SODIUM 40 MILLIGRAM(S): 20 TABLET, DELAYED RELEASE ORAL at 06:27

## 2017-12-09 RX ADMIN — LISINOPRIL 7.5 MILLIGRAM(S): 2.5 TABLET ORAL at 22:28

## 2017-12-09 RX ADMIN — SPIRONOLACTONE 12.5 MILLIGRAM(S): 25 TABLET, FILM COATED ORAL at 06:27

## 2017-12-09 RX ADMIN — Medication 50 MILLIGRAM(S): at 06:27

## 2017-12-09 RX ADMIN — WARFARIN SODIUM 2 MILLIGRAM(S): 2.5 TABLET ORAL at 22:28

## 2017-12-09 RX ADMIN — Medication 81 MILLIGRAM(S): at 12:43

## 2017-12-09 NOTE — PROGRESS NOTE ADULT - PROBLEM SELECTOR PLAN 3
likely urinary source  follow up cultures negative up do date   continue empiric ceftriaxone-ID will change to po Levaquin in am & be discharged home  s/p empiric vancomycin

## 2017-12-09 NOTE — ED ADULT NURSE REASSESSMENT NOTE - NS ED NURSE REASSESS COMMENT FT1
spoke with Tamanna Mcclendon LVAD coordinator, not on campus   states call CTICU   CTICU contacted transferred to [facility]  states will come down to ED

## 2017-12-09 NOTE — H&P ADULT - ASSESSMENT
62M w/PMH CHF s/p LVAD 09/2017 on coumadin c/b UTI (off abx >2w), SIADH, CKD3, smoking who presented to [facility] ER 12/4/17  with c/o fatigue x 3-4 days.  Reports associated symptom of hiccups, dysuria, decreased  urine output, sob, cp, and decreased PO intake 2/2 abd pain when eating.  Patient was found to have (+) UA and admitted for urosepsis. Was discharged home 12/8 with home care and levaquin but now presents back to [facility] ED after not being able to enter his home.

## 2017-12-09 NOTE — H&P ADULT - HISTORY OF PRESENT ILLNESS
62M w/PMH CHF s/p LVAD 09/2017 on coumadin c/b UTI (off abx >2w), SIADH, CKD3, smoking who presented to [facility] ER 12/4/17  with c/o fatigue x 3-4 days.  Reports associated symptom of hiccups, dysuria, decreased  urine output, sob, cp, and decreased PO intake 2/2 abd pain when eating.  Patient was found to have (+) UA and admitted for urosepsis. Was discharged home 12/8 with home care and levaquin but now presents back to [facility] ED after not being able to enter his home. Denies any current complaints.

## 2017-12-09 NOTE — ED PROVIDER NOTE - MEDICAL DECISION MAKING DETAILS
MORENITA Orourke MD: Pt with LVAD, d/c home today s/p recent admission, presents back to hospital because he is unable to get into his home. Pt has no complaints. Plan: Contact LVAD coordinator, KINSEY

## 2017-12-09 NOTE — ED ADULT NURSE REASSESSMENT NOTE - NS ED NURSE REASSESS COMMENT FT1
spoke to [facility] at [phone]   states pt does not need IV at this time   pending admission bed on 2coh

## 2017-12-09 NOTE — PROGRESS NOTE ADULT - SUBJECTIVE AND OBJECTIVE BOX
Subjective I can go home tomorrow     VITAL SIGNS    Telemetry:  NSR 90s    Vital Signs Last 24 Hrs  T(C): 36.7 (12-09-17 @ 11:00), Max: 36.8 (12-08-17 @ 17:55)  T(F): 98.1 (12-09-17 @ 11:00), Max: 98.2 (12-08-17 @ 17:55)  HR: 90 (12-09-17 @ 11:00) (90 - 102)  BP: --  RR: 18 (12-09-17 @ 11:00) (18 - 18)  SpO2: 99% (12-09-17 @ 11:00) (97% - 100%)         12-08 @ 07:01  -  12-09 @ 07:00  LVAD  speed 9000  flow 4.4  power5.1  pi 5.9  [id number]  --------------------------------------------------------  IN: 0 mL / OUT: 300 mL / NET: -300 mL    12-09 @ 07:01  -  12-09 @ 11:48  --------------------------------------------------------  IN: 0 mL / OUT: 300 mL / NET: -300 mL  Daily Height in cm: 167.64 (09 Dec 2017 11:00)  MEDICATIONS  (STANDING):  aspirin enteric coated 81 milliGRAM(s) Oral daily  finasteride 5 milliGRAM(s) Oral daily  levoFLOXacin  Tablet 500 milliGRAM(s) Oral daily  lisinopril 7.5 milliGRAM(s) Oral at bedtime  metoprolol succinate ER 50 milliGRAM(s) Oral daily  mirtazapine 7.5 milliGRAM(s) Oral at bedtime  pantoprazole    Tablet 40 milliGRAM(s) Oral before breakfast  spironolactone 12.5 milliGRAM(s) Oral daily  warfarin 2 milliGRAM(s) Oral once    MEDICATIONS  (PRN):  polyethylene glycol 3350 17 Gram(s) Oral at bedtime PRN Constipation    Coumadin    [ x] YES          [  ]      NO         Reason:   LVAD  PHYSICAL EXAM  Neurology: alert and oriented x 3, nonfocal, no gross deficits  CV S1 LVAD sounds  Sternal Wound : healed  drive line CDI  Lungs:CTA  Abdomen: soft, nontender, nondistended, positive bowel sounds, last bowel movement 12/8  :   voids          Extremities:   warm  well perfused no calf tenderness no edema        Physical Therapy Rec:   Home  [x  ]   Home w/ PT  [  ]  Rehab  [  ]    Discussed with Cardiothoracic Team at AM rounds.

## 2017-12-09 NOTE — H&P ADULT - PROBLEM SELECTOR PLAN 1
C/w ASA, Lisinopril, Toprol, Aldactone for CHF.  C/w Coumadin for LVAD.  C/w Levaquin for UTI.  C/w Mirtazapine for depression.  C/w Proscar for BPH.  C/w Protonix for GI prophylaxis.  C/w OOB, ambulation as tolerated.  Discuss case with social work.

## 2017-12-09 NOTE — ED PROVIDER NOTE - OBJECTIVE STATEMENT
PT is a 63 y/o male with PMH NICM, CHF, s/p LVAD, d/c home today s/p recent admission, presents back to hospital because he is unable to get into his home. Pt recently admitted for renal failure, hyponatremia. Pt has no complaints, feels well otherwise. Had been discharged less than 2 hrs prior to re-arrival.

## 2017-12-09 NOTE — PROGRESS NOTE ADULT - ASSESSMENT
62M w/PMH CHF s/p LVAD 09/2017 on coumadin c/b UTI (off abx >2w), SIADH, CKD3, smoking who presented to [facility] ER 12/4/17  with c/o fatigue x 3-4 days.  Reports associated symptom of hiccups, dysuria, decreased  urine output, sob, cp, and decreased PO intake 2/2 abd pain when eating.  Patient was found to have (+) UA and admitted for urosepsis.   12/5 Coumadin 1.5 for tonight Seen by ID on Vancomycin 1 cortez q d and ceftriaxone 1 gram qd Blood cx negative urine cx pending Renal US ordered 500 .9 NS bolus for hypovolemia. Hold  lisinopril per HF.  12/6 renal sono done  12/7 INR 2.53 Coumadin 1mg po given-   12/8 patient discharged home returned to [facility] did not have his key unable to enter home labs Coumadin 2 ordered  12/9 D/W patient family to return home  and pick patient up on 12/10 at 10 amlabs Coumadin 2 ordered

## 2017-12-09 NOTE — H&P ADULT - NSHPPHYSICALEXAM_GEN_ALL_CORE
General: NAD, sitting upright in bed  Neuro: A&Ox3  Card: No S1, S2. + LVAD sounds.  Pulm: CTA b/l  Abd: soft nontender nondistended. + BS.  Ext: No LE edema, no calf tenderness, + DP pulses

## 2017-12-10 ENCOUNTER — TRANSCRIPTION ENCOUNTER (OUTPATIENT)
Age: 62
End: 2017-12-10

## 2017-12-10 LAB
ANION GAP SERPL CALC-SCNC: 12 MMOL/L — SIGNIFICANT CHANGE UP (ref 5–17)
BUN SERPL-MCNC: 22 MG/DL — SIGNIFICANT CHANGE UP (ref 7–23)
CALCIUM SERPL-MCNC: 9.6 MG/DL — SIGNIFICANT CHANGE UP (ref 8.4–10.5)
CHLORIDE SERPL-SCNC: 100 MMOL/L — SIGNIFICANT CHANGE UP (ref 96–108)
CO2 SERPL-SCNC: 24 MMOL/L — SIGNIFICANT CHANGE UP (ref 22–31)
CREAT SERPL-MCNC: 0.92 MG/DL — SIGNIFICANT CHANGE UP (ref 0.5–1.3)
CULTURE RESULTS: SIGNIFICANT CHANGE UP
CULTURE RESULTS: SIGNIFICANT CHANGE UP
GLUCOSE SERPL-MCNC: 113 MG/DL — HIGH (ref 70–99)
HCT VFR BLD CALC: 22.8 % — LOW (ref 39–50)
HCT VFR BLD CALC: 33.8 % — LOW (ref 39–50)
HGB BLD-MCNC: 10.9 G/DL — LOW (ref 13–17)
HGB BLD-MCNC: 7.4 G/DL — LOW (ref 13–17)
INR BLD: 1.76 RATIO — HIGH (ref 0.88–1.16)
LDH SERPL L TO P-CCNC: 303 U/L — HIGH (ref 50–242)
MCHC RBC-ENTMCNC: 29.7 PG — SIGNIFICANT CHANGE UP (ref 27–34)
MCHC RBC-ENTMCNC: 30.8 PG — SIGNIFICANT CHANGE UP (ref 27–34)
MCHC RBC-ENTMCNC: 32.2 GM/DL — SIGNIFICANT CHANGE UP (ref 32–36)
MCHC RBC-ENTMCNC: 32.4 GM/DL — SIGNIFICANT CHANGE UP (ref 32–36)
MCV RBC AUTO: 92.1 FL — SIGNIFICANT CHANGE UP (ref 80–100)
MCV RBC AUTO: 95.1 FL — SIGNIFICANT CHANGE UP (ref 80–100)
PLATELET # BLD AUTO: 359 K/UL — SIGNIFICANT CHANGE UP (ref 150–400)
PLATELET # BLD AUTO: 379 K/UL — SIGNIFICANT CHANGE UP (ref 150–400)
POTASSIUM SERPL-MCNC: 4.4 MMOL/L — SIGNIFICANT CHANGE UP (ref 3.5–5.3)
POTASSIUM SERPL-SCNC: 4.4 MMOL/L — SIGNIFICANT CHANGE UP (ref 3.5–5.3)
PROTHROM AB SERPL-ACNC: 19.4 SEC — HIGH (ref 9.8–12.7)
RBC # BLD: 2.39 M/UL — LOW (ref 4.2–5.8)
RBC # BLD: 3.67 M/UL — LOW (ref 4.2–5.8)
RBC # FLD: 14.8 % — HIGH (ref 10.3–14.5)
RBC # FLD: 15.7 % — HIGH (ref 10.3–14.5)
SODIUM SERPL-SCNC: 136 MMOL/L — SIGNIFICANT CHANGE UP (ref 135–145)
SPECIMEN SOURCE: SIGNIFICANT CHANGE UP
SPECIMEN SOURCE: SIGNIFICANT CHANGE UP
WBC # BLD: 11.3 K/UL — HIGH (ref 3.8–10.5)
WBC # BLD: 15.6 K/UL — HIGH (ref 3.8–10.5)
WBC # FLD AUTO: 11.3 K/UL — HIGH (ref 3.8–10.5)
WBC # FLD AUTO: 15.6 K/UL — HIGH (ref 3.8–10.5)

## 2017-12-10 PROCEDURE — 85730 THROMBOPLASTIN TIME PARTIAL: CPT

## 2017-12-10 PROCEDURE — G0378: CPT

## 2017-12-10 PROCEDURE — 99285 EMERGENCY DEPT VISIT HI MDM: CPT

## 2017-12-10 PROCEDURE — 85027 COMPLETE CBC AUTOMATED: CPT

## 2017-12-10 PROCEDURE — 83615 LACTATE (LD) (LDH) ENZYME: CPT

## 2017-12-10 PROCEDURE — 85610 PROTHROMBIN TIME: CPT

## 2017-12-10 PROCEDURE — 99239 HOSP IP/OBS DSCHRG MGMT >30: CPT

## 2017-12-10 PROCEDURE — 80048 BASIC METABOLIC PNL TOTAL CA: CPT

## 2017-12-10 RX ORDER — MIRTAZAPINE 45 MG/1
1 TABLET, ORALLY DISINTEGRATING ORAL
Qty: 30 | Refills: 0
Start: 2017-12-10 | End: 2018-01-08

## 2017-12-10 RX ORDER — CIPROFLOXACIN LACTATE 400MG/40ML
1 VIAL (ML) INTRAVENOUS
Qty: 4 | Refills: 0 | OUTPATIENT
Start: 2017-12-10 | End: 2017-12-13

## 2017-12-10 RX ORDER — ASPIRIN/CALCIUM CARB/MAGNESIUM 324 MG
1 TABLET ORAL
Qty: 30 | Refills: 0
Start: 2017-12-10 | End: 2018-01-08

## 2017-12-10 RX ORDER — WARFARIN SODIUM 2.5 MG/1
3 TABLET ORAL ONCE
Qty: 0 | Refills: 0 | Status: DISCONTINUED | OUTPATIENT
Start: 2017-12-10 | End: 2017-12-10

## 2017-12-10 RX ORDER — LISINOPRIL 2.5 MG/1
3 TABLET ORAL
Qty: 0 | Refills: 0 | COMMUNITY
Start: 2017-12-10

## 2017-12-10 RX ORDER — WARFARIN SODIUM 2.5 MG/1
1 TABLET ORAL
Qty: 30 | Refills: 0
Start: 2017-12-10 | End: 2018-01-08

## 2017-12-10 RX ORDER — PANTOPRAZOLE SODIUM 20 MG/1
1 TABLET, DELAYED RELEASE ORAL
Qty: 30 | Refills: 0
Start: 2017-12-10 | End: 2018-01-08

## 2017-12-10 RX ORDER — FINASTERIDE 5 MG/1
1 TABLET, FILM COATED ORAL
Qty: 30 | Refills: 0
Start: 2017-12-10 | End: 2018-01-08

## 2017-12-10 RX ORDER — CIPROFLOXACIN LACTATE 400MG/40ML
1 VIAL (ML) INTRAVENOUS
Qty: 4 | Refills: 0
Start: 2017-12-10 | End: 2017-12-13

## 2017-12-10 RX ORDER — METOPROLOL TARTRATE 50 MG
1 TABLET ORAL
Qty: 30 | Refills: 0
Start: 2017-12-10 | End: 2018-01-08

## 2017-12-10 RX ORDER — SPIRONOLACTONE 25 MG/1
0.5 TABLET, FILM COATED ORAL
Qty: 15 | Refills: 0
Start: 2017-12-10 | End: 2018-01-08

## 2017-12-10 RX ORDER — METOPROLOL TARTRATE 50 MG
4 TABLET ORAL
Qty: 0 | Refills: 0 | DISCHARGE
Start: 2017-12-10 | End: 2018-01-08

## 2017-12-10 RX ORDER — POLYETHYLENE GLYCOL 3350 17 G/17G
17 POWDER, FOR SOLUTION ORAL
Qty: 1 | Refills: 0
Start: 2017-12-10 | End: 2018-01-08

## 2017-12-10 RX ORDER — LISINOPRIL 2.5 MG/1
3 TABLET ORAL
Qty: 90 | Refills: 0
Start: 2017-12-10 | End: 2018-01-08

## 2017-12-10 RX ADMIN — SPIRONOLACTONE 12.5 MILLIGRAM(S): 25 TABLET, FILM COATED ORAL at 06:16

## 2017-12-10 RX ADMIN — PANTOPRAZOLE SODIUM 40 MILLIGRAM(S): 20 TABLET, DELAYED RELEASE ORAL at 06:16

## 2017-12-10 RX ADMIN — Medication 50 MILLIGRAM(S): at 06:16

## 2017-12-10 RX ADMIN — FINASTERIDE 5 MILLIGRAM(S): 5 TABLET, FILM COATED ORAL at 11:28

## 2017-12-10 RX ADMIN — Medication 81 MILLIGRAM(S): at 11:28

## 2017-12-10 NOTE — DISCHARGE NOTE ADULT - CARE PROVIDERS DIRECT ADDRESSES
[email or web address]

## 2017-12-10 NOTE — DISCHARGE NOTE ADULT - MEDICATION SUMMARY - MEDICATIONS TO TAKE
I will START or STAY ON the medications listed below when I get home from the hospital:    finasteride 5 mg oral tablet  -- 1 tab(s) by mouth once a day  -- Indication: For bph    spironolactone 25 mg oral tablet  -- 0.5 tab(s) by mouth once a day  -- Indication: For diuretic    oxyCODONE 5 mg oral tablet  -- 1 tab(s) by mouth every 6 hours, As needed, Severe Pain (7 - 10) MDD:4 tabs  -- Indication: For pain    acetaminophen 325 mg oral tablet  -- 2 tab(s) by mouth every 6 hours, As needed, For Temp greater than 38.5 C (101.3 F)  -- Indication: For pain    aspirin 81 mg oral delayed release tablet  -- 1 tab(s) by mouth once a day  -- Indication: For blood thinner    lisinopril 2.5 mg oral tablet  -- 3 tab(s) by mouth once a day (at bedtime)  -- Indication: For HTN    Coumadin 2 mg oral tablet  -- 1 tab(s) by mouth once a day (at bedtime)   -- Do not take this drug if you are pregnant.  It is very important that you take or use this exactly as directed.  Do not skip doses or discontinue unless directed by your doctor.  Obtain medical advice before taking any non-prescription drugs as some may affect the action of this medication.    -- Indication: For blood thinner    mirtazapine 7.5 mg oral tablet  -- 1 tab(s) by mouth once a day (at bedtime)  -- Indication: For Sleep    metoprolol succinate 50 mg oral tablet, extended release  -- 1 tab(s) by mouth once a day  -- Indication: For Heart rate    polyethylene glycol 3350 oral powder for reconstitution  -- 17 gram(s) by mouth once a day (at bedtime), As Needed -for constipation   -- Indication: For laxative    pantoprazole 40 mg oral delayed release tablet  -- 1 tab(s) by mouth once a day (before a meal)  -- Indication: For antacid    Levaquin 500 mg oral tablet  -- 1 tab(s) by mouth once a day x 4 days  stop 12/14   -- Avoid prolonged or excessive exposure to direct and/or artificial sunlight while taking this medication.  Do not take dairy products, antacids, or iron preparations within one hour of this medication.  Finish all this medication unless otherwise directed by prescriber.  May cause drowsiness or dizziness.  Medication should be taken with plenty of water.    -- Indication: For antibiotic

## 2017-12-10 NOTE — DISCHARGE NOTE ADULT - PATIENT PORTAL LINK FT
“You can access the Follow[facility] Patient Portal, offered by [facility], by registering with the following website: [email or web address]”

## 2017-12-10 NOTE — DISCHARGE NOTE ADULT - ADDITIONAL INSTRUCTIONS
Follow up with LVAD clinic on 12/14  take all medications as prescribed  coumadin dosed by INR goal 2-3  call our office  for any concerns fever or chills  follow up with Dr Gary Goldberg call for appointment [phone]  [place] until 12/14/17

## 2017-12-10 NOTE — DISCHARGE NOTE ADULT - CARE PLAN
Principal Discharge DX:	LVAD (left ventricular assist device) present  Goal:	full recovery  Instructions for follow-up, activity and diet:	Follow up with LVAD clinic on 12/14  take all medications as prescribed  coumadin dosed by INR goal 2-3  call our office  for any concerns fever or chills  Secondary Diagnosis:	Sepsis, due to unspecified organism  Goal:	full recovery  Instructions for follow-up, activity and diet:	follow up with Dr Gary Goldberg call for appointment [phone]  [facility] until 12/14/17

## 2017-12-10 NOTE — DISCHARGE NOTE ADULT - CARE PROVIDER_API CALL
Christiano Iyer (MD; MPH), Adv Heart Fail Trnsplnt Cardio; Cardiology; Internal Medicine  [address] [place] [address]  Phone: [phone]  Fax: [phone]    Goldberg, Gary D (MD), Urology  [address] [place] [address]  Phone: [phone]  Fax: [phone]

## 2017-12-10 NOTE — DISCHARGE NOTE ADULT - PLAN OF CARE
full recovery Follow up with LVAD clinic on 12/14  take all medications as prescribed  coumadin dosed by INR goal 2-3  call our office  for any concerns fever or chills follow up with Dr Gary Goldberg call for appointment [phone]  [facility] until 12/14/17

## 2017-12-10 NOTE — DISCHARGE NOTE ADULT - HOSPITAL COURSE
62M w/PMH CHF s/p LVAD 09/2017 on coumadin c/b UTI (off abx >2w), SIADH, CKD3, smoking who presented to [facility] ER 12/4/17  with c/o fatigue x 3-4 days.  Reports associated symptom of hiccups, dysuria, decreased  urine output, sob, cp, and decreased PO intake 2/2 abd pain when eating.  Patient was found to have (+) UA and admitted for urosepsis.   12/5 Coumadin 1.5 for tonight Seen by ID on Vancomycin 1 cortez q d and ceftriaxone 1 gram qd Blood cx negative urine cx pending Renal US ordered 500 .9 NS bolus for hypovolemia. Hold  lisinopril per HF.  12/6 renal sono done  12/7 INR 2.53 Coumadin 1mg po given-   12/8 patient discharged home returned to [facility] did not have his key unable to enter home labs Coumadin 2 ordered  12/9 D/W patient family to return home  and pick patient up on 12/10 at 10 am labs Coumadin 2 ordered  12/10 stable and eager for discharge home

## 2017-12-11 ENCOUNTER — RX RENEWAL (OUTPATIENT)
Age: 62
End: 2017-12-11

## 2017-12-11 RX ORDER — WARFARIN 5 MG/1
5 TABLET ORAL DAILY
Qty: 90 | Refills: 3 | Status: DISCONTINUED | COMMUNITY
Start: 2017-11-22 | End: 2017-12-11

## 2017-12-11 RX ORDER — SPIRONOLACTONE 25 MG/1
25 TABLET ORAL DAILY
Qty: 90 | Refills: 3 | Status: DISCONTINUED | COMMUNITY
Start: 2017-12-11 | End: 2017-12-11

## 2017-12-11 RX ORDER — WARFARIN 1 MG/1
1 TABLET ORAL
Qty: 180 | Refills: 3 | Status: DISCONTINUED | COMMUNITY
Start: 2017-11-02 | End: 2017-12-11

## 2017-12-13 ENCOUNTER — OTHER (OUTPATIENT)
Age: 62
End: 2017-12-13

## 2017-12-13 ENCOUNTER — APPOINTMENT (OUTPATIENT)
Dept: UROLOGY | Facility: CLINIC | Age: 62
End: 2017-12-13
Payer: MEDICAID

## 2017-12-13 PROCEDURE — 99214 OFFICE O/P EST MOD 30 MIN: CPT

## 2017-12-14 ENCOUNTER — APPOINTMENT (OUTPATIENT)
Dept: CARDIOLOGY | Facility: CLINIC | Age: 62
End: 2017-12-14
Payer: MEDICAID

## 2017-12-14 LAB
APPEARANCE: CLEAR
BACTERIA: NEGATIVE
BILIRUBIN URINE: NEGATIVE
BLOOD URINE: NEGATIVE
COLOR: YELLOW
CORE LAB FLUID CYTOLOGY: NORMAL
GLUCOSE QUALITATIVE U: NEGATIVE MG/DL
HYALINE CASTS: 1 /LPF
KETONES URINE: NEGATIVE
LEUKOCYTE ESTERASE URINE: ABNORMAL
MICROSCOPIC-UA: NORMAL
NITRITE URINE: NEGATIVE
PH URINE: 5.5
PROTEIN URINE: NEGATIVE MG/DL
RED BLOOD CELLS URINE: 2 /HPF
SPECIFIC GRAVITY URINE: 1.01
SQUAMOUS EPITHELIAL CELLS: 1 /HPF
UROBILINOGEN URINE: NEGATIVE MG/DL
WHITE BLOOD CELLS URINE: 22 /HPF

## 2017-12-14 PROCEDURE — 99215 OFFICE O/P EST HI 40 MIN: CPT

## 2017-12-14 PROCEDURE — 93750 INTERROGATION VAD IN PERSON: CPT

## 2017-12-15 LAB — BACTERIA UR CULT: NORMAL

## 2017-12-19 PROCEDURE — 82947 ASSAY GLUCOSE BLOOD QUANT: CPT

## 2017-12-19 PROCEDURE — 85730 THROMBOPLASTIN TIME PARTIAL: CPT

## 2017-12-19 PROCEDURE — 85027 COMPLETE CBC AUTOMATED: CPT

## 2017-12-19 PROCEDURE — 82330 ASSAY OF CALCIUM: CPT

## 2017-12-19 PROCEDURE — 80048 BASIC METABOLIC PNL TOTAL CA: CPT

## 2017-12-19 PROCEDURE — 93005 ELECTROCARDIOGRAM TRACING: CPT | Mod: XU

## 2017-12-19 PROCEDURE — 80053 COMPREHEN METABOLIC PANEL: CPT

## 2017-12-19 PROCEDURE — 82435 ASSAY OF BLOOD CHLORIDE: CPT

## 2017-12-19 PROCEDURE — 85014 HEMATOCRIT: CPT

## 2017-12-19 PROCEDURE — 87040 BLOOD CULTURE FOR BACTERIA: CPT

## 2017-12-19 PROCEDURE — 99285 EMERGENCY DEPT VISIT HI MDM: CPT | Mod: 25

## 2017-12-19 PROCEDURE — 84132 ASSAY OF SERUM POTASSIUM: CPT

## 2017-12-19 PROCEDURE — 87798 DETECT AGENT NOS DNA AMP: CPT

## 2017-12-19 PROCEDURE — 51701 INSERT BLADDER CATHETER: CPT

## 2017-12-19 PROCEDURE — 87581 M.PNEUMON DNA AMP PROBE: CPT

## 2017-12-19 PROCEDURE — 76775 US EXAM ABDO BACK WALL LIM: CPT

## 2017-12-19 PROCEDURE — 87086 URINE CULTURE/COLONY COUNT: CPT

## 2017-12-19 PROCEDURE — 85610 PROTHROMBIN TIME: CPT

## 2017-12-19 PROCEDURE — 83615 LACTATE (LD) (LDH) ENZYME: CPT

## 2017-12-19 PROCEDURE — 74176 CT ABD & PELVIS W/O CONTRAST: CPT

## 2017-12-19 PROCEDURE — 83605 ASSAY OF LACTIC ACID: CPT

## 2017-12-19 PROCEDURE — 87633 RESP VIRUS 12-25 TARGETS: CPT

## 2017-12-19 PROCEDURE — 84295 ASSAY OF SERUM SODIUM: CPT

## 2017-12-19 PROCEDURE — 71045 X-RAY EXAM CHEST 1 VIEW: CPT

## 2017-12-19 PROCEDURE — 71250 CT THORAX DX C-: CPT

## 2017-12-19 PROCEDURE — 82803 BLOOD GASES ANY COMBINATION: CPT

## 2017-12-19 PROCEDURE — 82962 GLUCOSE BLOOD TEST: CPT

## 2017-12-19 PROCEDURE — 81001 URINALYSIS AUTO W/SCOPE: CPT

## 2017-12-19 PROCEDURE — 87486 CHLMYD PNEUM DNA AMP PROBE: CPT

## 2017-12-20 ENCOUNTER — RESULT REVIEW (OUTPATIENT)
Age: 62
End: 2017-12-20

## 2017-12-28 ENCOUNTER — APPOINTMENT (OUTPATIENT)
Dept: CARDIOLOGY | Facility: CLINIC | Age: 62
End: 2017-12-28
Payer: MEDICAID

## 2017-12-28 VITALS
SYSTOLIC BLOOD PRESSURE: 90 MMHG | TEMPERATURE: 97.6 F | DIASTOLIC BLOOD PRESSURE: 60 MMHG | BODY MASS INDEX: 20.33 KG/M2 | RESPIRATION RATE: 15 BRPM | HEART RATE: 102 BPM | OXYGEN SATURATION: 100 % | WEIGHT: 142 LBS | HEIGHT: 70 IN

## 2017-12-28 PROCEDURE — 99215 OFFICE O/P EST HI 40 MIN: CPT

## 2017-12-28 PROCEDURE — 93750 INTERROGATION VAD IN PERSON: CPT

## 2018-01-02 ENCOUNTER — RX RENEWAL (OUTPATIENT)
Age: 63
End: 2018-01-02

## 2018-01-03 ENCOUNTER — MEDICATION RENEWAL (OUTPATIENT)
Age: 63
End: 2018-01-03

## 2018-01-03 ENCOUNTER — RESULT REVIEW (OUTPATIENT)
Age: 63
End: 2018-01-03

## 2018-01-11 ENCOUNTER — APPOINTMENT (OUTPATIENT)
Dept: CV DIAGNOSITCS | Facility: HOSPITAL | Age: 63
End: 2018-01-11

## 2018-01-11 ENCOUNTER — OUTPATIENT (OUTPATIENT)
Dept: OUTPATIENT SERVICES | Facility: HOSPITAL | Age: 63
LOS: 1 days | End: 2018-01-11
Payer: MEDICAID

## 2018-01-11 ENCOUNTER — NON-APPOINTMENT (OUTPATIENT)
Age: 63
End: 2018-01-11

## 2018-01-11 ENCOUNTER — APPOINTMENT (OUTPATIENT)
Dept: CARDIOLOGY | Facility: CLINIC | Age: 63
End: 2018-01-11
Payer: MEDICAID

## 2018-01-11 VITALS
TEMPERATURE: 97.6 F | BODY MASS INDEX: 20.37 KG/M2 | OXYGEN SATURATION: 100 % | RESPIRATION RATE: 18 BRPM | HEART RATE: 98 BPM | DIASTOLIC BLOOD PRESSURE: 66 MMHG | WEIGHT: 142 LBS | SYSTOLIC BLOOD PRESSURE: 94 MMHG

## 2018-01-11 VITALS — SYSTOLIC BLOOD PRESSURE: 72 MMHG | DIASTOLIC BLOOD PRESSURE: 71 MMHG

## 2018-01-11 DIAGNOSIS — I35.0 NONRHEUMATIC AORTIC (VALVE) STENOSIS: ICD-10-CM

## 2018-01-11 DIAGNOSIS — Z98.890 OTHER SPECIFIED POSTPROCEDURAL STATES: Chronic | ICD-10-CM

## 2018-01-11 DIAGNOSIS — Z95.811 PRESENCE OF HEART ASSIST DEVICE: Chronic | ICD-10-CM

## 2018-01-11 PROCEDURE — 93306 TTE W/DOPPLER COMPLETE: CPT | Mod: 26

## 2018-01-11 PROCEDURE — XXXXX: CPT

## 2018-01-11 PROCEDURE — 93306 TTE W/DOPPLER COMPLETE: CPT

## 2018-01-11 RX ORDER — CARVEDILOL 12.5 MG/1
12.5 TABLET, FILM COATED ORAL TWICE DAILY
Qty: 180 | Refills: 3 | Status: DISCONTINUED | COMMUNITY
Start: 2017-11-30 | End: 2018-01-11

## 2018-01-16 ENCOUNTER — APPOINTMENT (OUTPATIENT)
Dept: UROLOGY | Facility: CLINIC | Age: 63
End: 2018-01-16
Payer: MEDICAID

## 2018-01-16 PROCEDURE — 99214 OFFICE O/P EST MOD 30 MIN: CPT

## 2018-01-17 LAB
APPEARANCE: CLEAR
BACTERIA UR CULT: NORMAL
BACTERIA: NEGATIVE
BILIRUBIN URINE: NEGATIVE
BLOOD URINE: NEGATIVE
COLOR: YELLOW
GLUCOSE QUALITATIVE U: NEGATIVE MG/DL
KETONES URINE: NEGATIVE
LEUKOCYTE ESTERASE URINE: NEGATIVE
MICROSCOPIC-UA: NORMAL
NITRITE URINE: NEGATIVE
PH URINE: 6
PROTEIN URINE: NEGATIVE MG/DL
RED BLOOD CELLS URINE: 2 /HPF
SPECIFIC GRAVITY URINE: 1.01
SQUAMOUS EPITHELIAL CELLS: 0 /HPF
UROBILINOGEN URINE: NEGATIVE MG/DL
WHITE BLOOD CELLS URINE: 5 /HPF

## 2018-01-19 LAB — CORE LAB FLUID CYTOLOGY: NORMAL

## 2018-01-23 ENCOUNTER — RESULT REVIEW (OUTPATIENT)
Age: 63
End: 2018-01-23

## 2018-01-31 ENCOUNTER — RESULT REVIEW (OUTPATIENT)
Age: 63
End: 2018-01-31

## 2018-02-07 ENCOUNTER — OTHER (OUTPATIENT)
Age: 63
End: 2018-02-07

## 2018-02-08 ENCOUNTER — NON-APPOINTMENT (OUTPATIENT)
Age: 63
End: 2018-02-08

## 2018-02-08 ENCOUNTER — APPOINTMENT (OUTPATIENT)
Dept: CARDIOLOGY | Facility: CLINIC | Age: 63
End: 2018-02-08
Payer: MEDICAID

## 2018-02-08 VITALS
OXYGEN SATURATION: 99 % | WEIGHT: 130 LBS | HEIGHT: 70 IN | RESPIRATION RATE: 16 BRPM | TEMPERATURE: 94.4 F | BODY MASS INDEX: 18.61 KG/M2

## 2018-02-08 VITALS — HEART RATE: 111 BPM

## 2018-02-08 DIAGNOSIS — Z86.79 PERSONAL HISTORY OF OTHER DISEASES OF THE CIRCULATORY SYSTEM: ICD-10-CM

## 2018-02-08 DIAGNOSIS — F17.200 NICOTINE DEPENDENCE, UNSPECIFIED, UNCOMPLICATED: ICD-10-CM

## 2018-02-08 DIAGNOSIS — I50.22 CHRONIC SYSTOLIC (CONGESTIVE) HEART FAILURE: ICD-10-CM

## 2018-02-08 DIAGNOSIS — Z87.448 PERSONAL HISTORY OF OTHER DISEASES OF URINARY SYSTEM: ICD-10-CM

## 2018-02-08 PROCEDURE — 99215 OFFICE O/P EST HI 40 MIN: CPT | Mod: 25

## 2018-02-08 PROCEDURE — 93750 INTERROGATION VAD IN PERSON: CPT

## 2018-02-08 RX ORDER — PANTOPRAZOLE 40 MG/1
40 TABLET, DELAYED RELEASE ORAL DAILY
Qty: 90 | Refills: 3 | Status: DISCONTINUED | COMMUNITY
Start: 2018-01-03 | End: 2018-02-08

## 2018-02-09 ENCOUNTER — RESULT REVIEW (OUTPATIENT)
Age: 63
End: 2018-02-09

## 2018-02-13 LAB
ALBUMIN SERPL ELPH-MCNC: 4.2 G/DL
ALP BLD-CCNC: 66 U/L
ALT SERPL-CCNC: 19 U/L
ANION GAP SERPL CALC-SCNC: 12 MMOL/L
AST SERPL-CCNC: 28 U/L
BASOPHILS # BLD AUTO: 0.05 K/UL
BASOPHILS NFR BLD AUTO: 1 %
BILIRUB SERPL-MCNC: 0.3 MG/DL
BUN SERPL-MCNC: 16 MG/DL
CALCIUM SERPL-MCNC: 9.7 MG/DL
CHLORIDE SERPL-SCNC: 101 MMOL/L
CO2 SERPL-SCNC: 26 MMOL/L
CREAT SERPL-MCNC: 0.95 MG/DL
EOSINOPHIL # BLD AUTO: 0.3 K/UL
EOSINOPHIL NFR BLD AUTO: 5.7 %
GLUCOSE SERPL-MCNC: 77 MG/DL
HCT VFR BLD CALC: 36.6 %
HGB BLD-MCNC: 11.6 G/DL
IMM GRANULOCYTES NFR BLD AUTO: 0.2 %
INR PPP: 2.06 RATIO
LDH SERPL-CCNC: 286 U/L
LYMPHOCYTES # BLD AUTO: 1.66 K/UL
LYMPHOCYTES NFR BLD AUTO: 31.6 %
MAN DIFF?: NORMAL
MCHC RBC-ENTMCNC: 28.8 PG
MCHC RBC-ENTMCNC: 31.7 GM/DL
MCV RBC AUTO: 90.8 FL
MONOCYTES # BLD AUTO: 0.63 K/UL
MONOCYTES NFR BLD AUTO: 12 %
NEUTROPHILS # BLD AUTO: 2.61 K/UL
NEUTROPHILS NFR BLD AUTO: 49.5 %
PLATELET # BLD AUTO: 205 K/UL
POTASSIUM SERPL-SCNC: 4.1 MMOL/L
PROT SERPL-MCNC: 8.5 G/DL
PT BLD: 23.6 SEC
RBC # BLD: 4.03 M/UL
RBC # FLD: 16.1 %
SODIUM SERPL-SCNC: 139 MMOL/L
WBC # FLD AUTO: 5.26 K/UL

## 2018-02-26 ENCOUNTER — MEDICATION RENEWAL (OUTPATIENT)
Age: 63
End: 2018-02-26

## 2018-02-26 RX ORDER — LEVOFLOXACIN 500 MG/1
500 TABLET, FILM COATED ORAL DAILY
Qty: 4 | Refills: 0 | Status: DISCONTINUED | COMMUNITY
Start: 2017-12-11 | End: 2018-02-26

## 2018-02-26 RX ORDER — FINASTERIDE 5 MG/1
5 TABLET, FILM COATED ORAL DAILY
Qty: 30 | Refills: 1 | Status: DISCONTINUED | COMMUNITY
Start: 2017-11-09 | End: 2018-02-26

## 2018-02-26 RX ORDER — ASPIRIN 81 MG
81 TABLET, DELAYED RELEASE (ENTERIC COATED) ORAL DAILY
Qty: 1 | Refills: 0 | Status: DISCONTINUED | COMMUNITY
End: 2018-02-26

## 2018-03-08 ENCOUNTER — APPOINTMENT (OUTPATIENT)
Dept: CARDIOLOGY | Facility: CLINIC | Age: 63
End: 2018-03-08
Payer: MEDICAID

## 2018-03-08 VITALS
TEMPERATURE: 97.16 F | OXYGEN SATURATION: 99 % | SYSTOLIC BLOOD PRESSURE: 82 MMHG | DIASTOLIC BLOOD PRESSURE: 81 MMHG | HEART RATE: 84 BPM

## 2018-03-08 PROCEDURE — 93750 INTERROGATION VAD IN PERSON: CPT

## 2018-03-08 PROCEDURE — 99215 OFFICE O/P EST HI 40 MIN: CPT | Mod: 25

## 2018-03-09 ENCOUNTER — OTHER (OUTPATIENT)
Age: 63
End: 2018-03-09

## 2018-03-13 ENCOUNTER — OTHER (OUTPATIENT)
Age: 63
End: 2018-03-13

## 2018-03-13 ENCOUNTER — APPOINTMENT (OUTPATIENT)
Dept: UROLOGY | Facility: CLINIC | Age: 63
End: 2018-03-13
Payer: MEDICAID

## 2018-03-13 ENCOUNTER — RESULT REVIEW (OUTPATIENT)
Age: 63
End: 2018-03-13

## 2018-03-13 ENCOUNTER — APPOINTMENT (OUTPATIENT)
Dept: CT IMAGING | Facility: IMAGING CENTER | Age: 63
End: 2018-03-13
Payer: MEDICAID

## 2018-03-13 ENCOUNTER — RX RENEWAL (OUTPATIENT)
Age: 63
End: 2018-03-13

## 2018-03-13 ENCOUNTER — OUTPATIENT (OUTPATIENT)
Dept: OUTPATIENT SERVICES | Facility: HOSPITAL | Age: 63
LOS: 1 days | End: 2018-03-13
Payer: MEDICAID

## 2018-03-13 DIAGNOSIS — Z98.890 OTHER SPECIFIED POSTPROCEDURAL STATES: Chronic | ICD-10-CM

## 2018-03-13 DIAGNOSIS — Z95.811 PRESENCE OF HEART ASSIST DEVICE: Chronic | ICD-10-CM

## 2018-03-13 DIAGNOSIS — Z95.811 PRESENCE OF HEART ASSIST DEVICE: ICD-10-CM

## 2018-03-13 PROCEDURE — 71250 CT THORAX DX C-: CPT

## 2018-03-13 PROCEDURE — 99214 OFFICE O/P EST MOD 30 MIN: CPT

## 2018-03-13 PROCEDURE — 71250 CT THORAX DX C-: CPT | Mod: 26

## 2018-03-18 LAB
APPEARANCE: CLEAR
BACTERIA UR CULT: NORMAL
BACTERIA: NEGATIVE
BILIRUBIN URINE: NEGATIVE
BLOOD URINE: NEGATIVE
COLOR: YELLOW
CORE LAB FLUID CYTOLOGY: NORMAL
GLUCOSE QUALITATIVE U: NEGATIVE MG/DL
HYALINE CASTS: 1 /LPF
KETONES URINE: NEGATIVE
LEUKOCYTE ESTERASE URINE: ABNORMAL
MICROSCOPIC-UA: NORMAL
NITRITE URINE: NEGATIVE
PH URINE: 5.5
PROTEIN URINE: NEGATIVE MG/DL
RED BLOOD CELLS URINE: 2 /HPF
SPECIFIC GRAVITY URINE: 1.02
SQUAMOUS EPITHELIAL CELLS: 1 /HPF
UROBILINOGEN URINE: NEGATIVE MG/DL
WHITE BLOOD CELLS URINE: 3 /HPF

## 2018-03-26 ENCOUNTER — RESULT REVIEW (OUTPATIENT)
Age: 63
End: 2018-03-26

## 2018-03-29 ENCOUNTER — APPOINTMENT (OUTPATIENT)
Dept: CARDIOLOGY | Facility: CLINIC | Age: 63
End: 2018-03-29
Payer: MEDICAID

## 2018-03-29 ENCOUNTER — OUTPATIENT (OUTPATIENT)
Dept: OUTPATIENT SERVICES | Facility: HOSPITAL | Age: 63
LOS: 1 days | End: 2018-03-29
Payer: MEDICAID

## 2018-03-29 ENCOUNTER — APPOINTMENT (OUTPATIENT)
Dept: CV DIAGNOSITCS | Facility: HOSPITAL | Age: 63
End: 2018-03-29

## 2018-03-29 VITALS
HEIGHT: 70 IN | WEIGHT: 151 LBS | DIASTOLIC BLOOD PRESSURE: 74 MMHG | OXYGEN SATURATION: 99 % | HEART RATE: 109 BPM | SYSTOLIC BLOOD PRESSURE: 102 MMHG | BODY MASS INDEX: 21.62 KG/M2 | RESPIRATION RATE: 16 BRPM

## 2018-03-29 DIAGNOSIS — Z95.811 PRESENCE OF HEART ASSIST DEVICE: Chronic | ICD-10-CM

## 2018-03-29 DIAGNOSIS — Z98.890 OTHER SPECIFIED POSTPROCEDURAL STATES: Chronic | ICD-10-CM

## 2018-03-29 DIAGNOSIS — I25.10 ATHEROSCLEROTIC HEART DISEASE OF NATIVE CORONARY ARTERY WITHOUT ANGINA PECTORIS: ICD-10-CM

## 2018-03-29 LAB
INR PPP: 2.36 RATIO
LDH SERPL-CCNC: 311 U/L
PT BLD: 25.9 SEC

## 2018-03-29 PROCEDURE — 93750 INTERROGATION VAD IN PERSON: CPT

## 2018-03-29 PROCEDURE — 93306 TTE W/DOPPLER COMPLETE: CPT | Mod: 26

## 2018-03-29 PROCEDURE — 99215 OFFICE O/P EST HI 40 MIN: CPT

## 2018-03-29 PROCEDURE — 93306 TTE W/DOPPLER COMPLETE: CPT

## 2018-03-29 RX ORDER — POLYETHYLENE GLYCOL 3350 17 G/17G
17 POWDER, FOR SOLUTION ORAL TWICE DAILY
Qty: 60 | Refills: 3 | Status: DISCONTINUED | COMMUNITY
End: 2018-03-29

## 2018-04-06 ENCOUNTER — RESULT REVIEW (OUTPATIENT)
Age: 63
End: 2018-04-06

## 2018-04-11 ENCOUNTER — APPOINTMENT (OUTPATIENT)
Dept: UROLOGY | Facility: CLINIC | Age: 63
End: 2018-04-11
Payer: MEDICAID

## 2018-04-11 ENCOUNTER — LABORATORY RESULT (OUTPATIENT)
Age: 63
End: 2018-04-11

## 2018-04-11 PROCEDURE — 99214 OFFICE O/P EST MOD 30 MIN: CPT

## 2018-04-11 RX ORDER — TROSPIUM CHLORIDE 20 MG/1
20 TABLET, FILM COATED ORAL
Qty: 30 | Refills: 11 | Status: COMPLETED | COMMUNITY
Start: 2018-03-13 | End: 2018-04-11

## 2018-04-12 LAB
ALBUMIN SERPL ELPH-MCNC: 4.3 G/DL
ALP BLD-CCNC: 61 U/L
ALT SERPL-CCNC: 19 U/L
ANION GAP SERPL CALC-SCNC: 13 MMOL/L
APTT BLD: 43 SEC
AST SERPL-CCNC: 26 U/L
BASOPHILS # BLD AUTO: 0.03 K/UL
BASOPHILS NFR BLD AUTO: 0.7 %
BILIRUB SERPL-MCNC: <0.2 MG/DL
BUN SERPL-MCNC: 12 MG/DL
CALCIUM SERPL-MCNC: 9.5 MG/DL
CHLORIDE SERPL-SCNC: 104 MMOL/L
CO2 SERPL-SCNC: 25 MMOL/L
CREAT SERPL-MCNC: 0.98 MG/DL
EOSINOPHIL # BLD AUTO: 0.18 K/UL
EOSINOPHIL NFR BLD AUTO: 4.1 %
GLUCOSE SERPL-MCNC: 111 MG/DL
HCT VFR BLD CALC: 37.7 %
HGB BLD-MCNC: 12 G/DL
IMM GRANULOCYTES NFR BLD AUTO: 0.2 %
LYMPHOCYTES # BLD AUTO: 1.86 K/UL
LYMPHOCYTES NFR BLD AUTO: 42.4 %
MAN DIFF?: NORMAL
MCHC RBC-ENTMCNC: 29.2 PG
MCHC RBC-ENTMCNC: 31.8 GM/DL
MCV RBC AUTO: 91.7 FL
MONOCYTES # BLD AUTO: 0.45 K/UL
MONOCYTES NFR BLD AUTO: 10.3 %
NEUTROPHILS # BLD AUTO: 1.86 K/UL
NEUTROPHILS NFR BLD AUTO: 42.3 %
PLATELET # BLD AUTO: 197 K/UL
POTASSIUM SERPL-SCNC: 4.3 MMOL/L
PROT SERPL-MCNC: 7.8 G/DL
PSA SERPL-MCNC: 1.17 NG/ML
RBC # BLD: 4.11 M/UL
RBC # FLD: 15.2 %
SODIUM SERPL-SCNC: 142 MMOL/L
TESTOST SERPL-MCNC: 531.2 NG/DL
WBC # FLD AUTO: 4.39 K/UL

## 2018-04-21 LAB
APPEARANCE: CLEAR
BACTERIA UR CULT: NORMAL
BACTERIA: NEGATIVE
BILIRUBIN URINE: NEGATIVE
BLOOD URINE: NEGATIVE
COLOR: YELLOW
CORE LAB FLUID CYTOLOGY: NORMAL
GLUCOSE QUALITATIVE U: NEGATIVE MG/DL
INR PPP: 1.85 RATIO
KETONES URINE: NEGATIVE
LEUKOCYTE ESTERASE URINE: NEGATIVE
MICROSCOPIC-UA: NORMAL
NITRITE URINE: NEGATIVE
PH URINE: 6
PROTEIN URINE: NEGATIVE MG/DL
PT BLD: 21.2 SEC
RED BLOOD CELLS URINE: 1 /HPF
SPECIFIC GRAVITY URINE: 1.02
SQUAMOUS EPITHELIAL CELLS: 0 /HPF
UROBILINOGEN URINE: NEGATIVE MG/DL
WHITE BLOOD CELLS URINE: 1 /HPF

## 2018-04-25 ENCOUNTER — RESULT REVIEW (OUTPATIENT)
Age: 63
End: 2018-04-25

## 2018-04-26 ENCOUNTER — NON-APPOINTMENT (OUTPATIENT)
Age: 63
End: 2018-04-26

## 2018-04-26 ENCOUNTER — APPOINTMENT (OUTPATIENT)
Dept: CARDIOLOGY | Facility: CLINIC | Age: 63
End: 2018-04-26
Payer: MEDICAID

## 2018-04-26 VITALS
DIASTOLIC BLOOD PRESSURE: 72 MMHG | BODY MASS INDEX: 22.48 KG/M2 | HEART RATE: 92 BPM | WEIGHT: 157 LBS | HEIGHT: 70 IN | OXYGEN SATURATION: 97 % | RESPIRATION RATE: 16 BRPM | SYSTOLIC BLOOD PRESSURE: 101 MMHG

## 2018-04-26 PROCEDURE — 99215 OFFICE O/P EST HI 40 MIN: CPT

## 2018-04-26 PROCEDURE — 93750 INTERROGATION VAD IN PERSON: CPT

## 2018-04-26 PROCEDURE — 93000 ELECTROCARDIOGRAM COMPLETE: CPT

## 2018-04-28 ENCOUNTER — RX RENEWAL (OUTPATIENT)
Age: 63
End: 2018-04-28

## 2018-05-02 ENCOUNTER — RESULT REVIEW (OUTPATIENT)
Age: 63
End: 2018-05-02

## 2018-05-10 ENCOUNTER — RESULT REVIEW (OUTPATIENT)
Age: 63
End: 2018-05-10

## 2018-05-14 ENCOUNTER — APPOINTMENT (OUTPATIENT)
Dept: UROLOGY | Facility: CLINIC | Age: 63
End: 2018-05-14
Payer: MEDICAID

## 2018-05-14 PROCEDURE — 99214 OFFICE O/P EST MOD 30 MIN: CPT

## 2018-05-18 ENCOUNTER — RESULT REVIEW (OUTPATIENT)
Age: 63
End: 2018-05-18

## 2018-05-23 ENCOUNTER — RESULT REVIEW (OUTPATIENT)
Age: 63
End: 2018-05-23

## 2018-05-26 LAB
APPEARANCE: CLEAR
BACTERIA UR CULT: NORMAL
BACTERIA: NEGATIVE
BILIRUBIN URINE: NEGATIVE
BLOOD URINE: NEGATIVE
COLOR: YELLOW
CORE LAB FLUID CYTOLOGY: NORMAL
GLUCOSE QUALITATIVE U: NEGATIVE MG/DL
KETONES URINE: NEGATIVE
LEUKOCYTE ESTERASE URINE: NEGATIVE
MICROSCOPIC-UA: NORMAL
NITRITE URINE: NEGATIVE
PH URINE: 6
PROTEIN URINE: NEGATIVE MG/DL
RED BLOOD CELLS URINE: 1 /HPF
SPECIFIC GRAVITY URINE: 1.02
SQUAMOUS EPITHELIAL CELLS: 0 /HPF
UROBILINOGEN URINE: NEGATIVE MG/DL
WHITE BLOOD CELLS URINE: 0 /HPF

## 2018-05-31 ENCOUNTER — NON-APPOINTMENT (OUTPATIENT)
Age: 63
End: 2018-05-31

## 2018-05-31 ENCOUNTER — APPOINTMENT (OUTPATIENT)
Dept: CARDIOLOGY | Facility: CLINIC | Age: 63
End: 2018-05-31
Payer: MEDICAID

## 2018-05-31 ENCOUNTER — RESULT REVIEW (OUTPATIENT)
Age: 63
End: 2018-05-31

## 2018-05-31 VITALS
HEIGHT: 70 IN | OXYGEN SATURATION: 99 % | WEIGHT: 160 LBS | RESPIRATION RATE: 16 BRPM | BODY MASS INDEX: 22.9 KG/M2 | HEART RATE: 86 BPM

## 2018-05-31 PROCEDURE — 93750 INTERROGATION VAD IN PERSON: CPT

## 2018-05-31 PROCEDURE — 99215 OFFICE O/P EST HI 40 MIN: CPT | Mod: 25

## 2018-06-01 LAB
ALBUMIN SERPL ELPH-MCNC: 4.6 G/DL
ALP BLD-CCNC: 69 U/L
ALT SERPL-CCNC: 12 U/L
ANION GAP SERPL CALC-SCNC: 9 MMOL/L
AST SERPL-CCNC: 19 U/L
BASOPHILS # BLD AUTO: 0 K/UL
BASOPHILS NFR BLD AUTO: 0.4 %
BILIRUB SERPL-MCNC: 0.2 MG/DL
BUN SERPL-MCNC: 12 MG/DL
CALCIUM SERPL-MCNC: 9.4 MG/DL
CHLORIDE SERPL-SCNC: 102 MMOL/L
CO2 SERPL-SCNC: 27 MMOL/L
CREAT SERPL-MCNC: 0.96 MG/DL
EOSINOPHIL # BLD AUTO: 0.2 K/UL
EOSINOPHIL NFR BLD AUTO: 4.7 %
GLUCOSE SERPL-MCNC: 101 MG/DL
HCT VFR BLD CALC: 39.1 %
HGB BLD-MCNC: 12.9 G/DL
INR PPP: 1.32 RATIO
LDH SERPL-CCNC: 274 U/L
LYMPHOCYTES # BLD AUTO: 1.8 K/UL
LYMPHOCYTES NFR BLD AUTO: 39.4 %
MAN DIFF?: NO
MCHC RBC-ENTMCNC: 30.6 PG
MCHC RBC-ENTMCNC: 33.2 GM/DL
MCV RBC AUTO: 92.4 FL
MONOCYTES # BLD AUTO: 0.4 K/UL
MONOCYTES NFR BLD AUTO: 8.3 %
NEUTROPHILS # BLD AUTO: 2.1 K/UL
NEUTROPHILS NFR BLD AUTO: 47.2 %
PLATELET # BLD AUTO: 171 K/UL
POTASSIUM SERPL-SCNC: 4.1 MMOL/L
PROT SERPL-MCNC: 8 G/DL
PT BLD: 14.5 SEC
RBC # BLD: 4.23 M/UL
RBC # FLD: 14.3 %
SODIUM SERPL-SCNC: 138 MMOL/L
WBC # FLD AUTO: 4.5 K/UL

## 2018-06-08 ENCOUNTER — RESULT REVIEW (OUTPATIENT)
Age: 63
End: 2018-06-08

## 2018-06-15 ENCOUNTER — RESULT REVIEW (OUTPATIENT)
Age: 63
End: 2018-06-15

## 2018-06-20 ENCOUNTER — RESULT REVIEW (OUTPATIENT)
Age: 63
End: 2018-06-20

## 2018-06-28 ENCOUNTER — APPOINTMENT (OUTPATIENT)
Dept: CARDIOLOGY | Facility: CLINIC | Age: 63
End: 2018-06-28
Payer: MEDICAID

## 2018-06-28 ENCOUNTER — NON-APPOINTMENT (OUTPATIENT)
Age: 63
End: 2018-06-28

## 2018-06-28 VITALS
HEART RATE: 69 BPM | BODY MASS INDEX: 23.91 KG/M2 | RESPIRATION RATE: 16 BRPM | SYSTOLIC BLOOD PRESSURE: 109 MMHG | OXYGEN SATURATION: 99 % | DIASTOLIC BLOOD PRESSURE: 77 MMHG | WEIGHT: 167 LBS | HEIGHT: 70 IN

## 2018-06-28 LAB
ALBUMIN SERPL ELPH-MCNC: 4.2 G/DL
ALP BLD-CCNC: 82 U/L
ALT SERPL-CCNC: 21 U/L
ANION GAP SERPL CALC-SCNC: 13 MMOL/L
AST SERPL-CCNC: 27 U/L
BASOPHILS # BLD AUTO: 0.03 K/UL
BASOPHILS NFR BLD AUTO: 0.7 %
BILIRUB SERPL-MCNC: 0.2 MG/DL
BUN SERPL-MCNC: 12 MG/DL
CALCIUM SERPL-MCNC: 9.5 MG/DL
CHLORIDE SERPL-SCNC: 103 MMOL/L
CO2 SERPL-SCNC: 22 MMOL/L
CREAT SERPL-MCNC: 0.96 MG/DL
EOSINOPHIL # BLD AUTO: 0.2 K/UL
EOSINOPHIL NFR BLD AUTO: 4.6 %
GLUCOSE SERPL-MCNC: 91 MG/DL
HCT VFR BLD CALC: 37.8 %
HGB BLD-MCNC: 12.4 G/DL
IMM GRANULOCYTES NFR BLD AUTO: 0 %
INR PPP: 2.41 RATIO
LDH SERPL-CCNC: 305 U/L
LYMPHOCYTES # BLD AUTO: 1.67 K/UL
LYMPHOCYTES NFR BLD AUTO: 38.4 %
MAN DIFF?: NORMAL
MCHC RBC-ENTMCNC: 29.1 PG
MCHC RBC-ENTMCNC: 32.8 GM/DL
MCV RBC AUTO: 88.7 FL
MONOCYTES # BLD AUTO: 0.42 K/UL
MONOCYTES NFR BLD AUTO: 9.7 %
NEUTROPHILS # BLD AUTO: 2.03 K/UL
NEUTROPHILS NFR BLD AUTO: 46.6 %
PLATELET # BLD AUTO: 190 K/UL
POTASSIUM SERPL-SCNC: 4.2 MMOL/L
PROT SERPL-MCNC: 8.5 G/DL
PT BLD: 27.7 SEC
RBC # BLD: 4.26 M/UL
RBC # FLD: 14.9 %
SODIUM SERPL-SCNC: 138 MMOL/L
WBC # FLD AUTO: 4.35 K/UL

## 2018-06-28 PROCEDURE — 99215 OFFICE O/P EST HI 40 MIN: CPT | Mod: 25

## 2018-06-28 PROCEDURE — 93750 INTERROGATION VAD IN PERSON: CPT

## 2018-06-28 PROCEDURE — 93000 ELECTROCARDIOGRAM COMPLETE: CPT

## 2018-06-28 RX ORDER — LIDOCAINE 5% 700 MG/1
5 PATCH TOPICAL
Qty: 10 | Refills: 20 | Status: DISCONTINUED | COMMUNITY
End: 2018-06-28

## 2018-06-28 RX ORDER — ENOXAPARIN SODIUM 100 MG/ML
60 INJECTION SUBCUTANEOUS
Qty: 20 | Refills: 2 | Status: DISCONTINUED | COMMUNITY
Start: 2018-03-29 | End: 2018-06-28

## 2018-06-28 RX ORDER — MIRTAZAPINE 7.5 MG/1
7.5 TABLET, FILM COATED ORAL
Qty: 90 | Refills: 3 | Status: DISCONTINUED | COMMUNITY
Start: 2018-04-28 | End: 2018-06-28

## 2018-06-28 RX ORDER — WARFARIN 5 MG/1
5 TABLET ORAL
Qty: 90 | Refills: 3 | Status: DISCONTINUED | COMMUNITY
Start: 2018-01-02 | End: 2018-06-28

## 2018-06-28 RX ORDER — WARFARIN 7.5 MG/1
7.5 TABLET ORAL DAILY
Qty: 90 | Refills: 3 | Status: DISCONTINUED | COMMUNITY
Start: 2018-03-13 | End: 2018-06-28

## 2018-06-28 RX ORDER — SILODOSIN 8 MG/1
8 CAPSULE ORAL DAILY
Qty: 90 | Refills: 3 | Status: DISCONTINUED | COMMUNITY
Start: 2017-11-30 | End: 2018-06-28

## 2018-06-28 RX ORDER — GLUCOSAMINE HCL/CHONDROITIN SU 500-400 MG
3 CAPSULE ORAL
Qty: 90 | Refills: 3 | Status: DISCONTINUED | COMMUNITY
Start: 2018-02-08 | End: 2018-06-28

## 2018-06-29 ENCOUNTER — RESULT REVIEW (OUTPATIENT)
Age: 63
End: 2018-06-29

## 2018-07-03 ENCOUNTER — OTHER (OUTPATIENT)
Age: 63
End: 2018-07-03

## 2018-07-05 ENCOUNTER — APPOINTMENT (OUTPATIENT)
Dept: CARDIOLOGY | Facility: CLINIC | Age: 63
End: 2018-07-05
Payer: MEDICAID

## 2018-07-05 ENCOUNTER — OUTPATIENT (OUTPATIENT)
Dept: OUTPATIENT SERVICES | Facility: HOSPITAL | Age: 63
LOS: 1 days | End: 2018-07-05
Payer: MEDICAID

## 2018-07-05 ENCOUNTER — NON-APPOINTMENT (OUTPATIENT)
Age: 63
End: 2018-07-05

## 2018-07-05 ENCOUNTER — MESSAGE (OUTPATIENT)
Age: 63
End: 2018-07-05

## 2018-07-05 ENCOUNTER — APPOINTMENT (OUTPATIENT)
Dept: INFECTIOUS DISEASE | Facility: CLINIC | Age: 63
End: 2018-07-05
Payer: MEDICAID

## 2018-07-05 ENCOUNTER — APPOINTMENT (OUTPATIENT)
Dept: INFECTIOUS DISEASE | Facility: CLINIC | Age: 63
End: 2018-07-05

## 2018-07-05 VITALS
HEIGHT: 70 IN | OXYGEN SATURATION: 98 % | BODY MASS INDEX: 23.05 KG/M2 | HEART RATE: 62 BPM | RESPIRATION RATE: 16 BRPM | WEIGHT: 161 LBS

## 2018-07-05 VITALS — DIASTOLIC BLOOD PRESSURE: 72 MMHG | SYSTOLIC BLOOD PRESSURE: 100 MMHG

## 2018-07-05 VITALS — SYSTOLIC BLOOD PRESSURE: 95 MMHG | DIASTOLIC BLOOD PRESSURE: 77 MMHG

## 2018-07-05 DIAGNOSIS — Z98.890 OTHER SPECIFIED POSTPROCEDURAL STATES: Chronic | ICD-10-CM

## 2018-07-05 DIAGNOSIS — Z95.811 PRESENCE OF HEART ASSIST DEVICE: Chronic | ICD-10-CM

## 2018-07-05 LAB
BACTERIA WND CULT: ABNORMAL
BASOPHILS # BLD AUTO: 0.03 K/UL
BASOPHILS NFR BLD AUTO: 0.7 %
EOSINOPHIL # BLD AUTO: 0.26 K/UL
EOSINOPHIL NFR BLD AUTO: 5.7 %
HCT VFR BLD CALC: 38.9 %
HGB BLD-MCNC: 12.8 G/DL
IMM GRANULOCYTES NFR BLD AUTO: 0 %
INR PPP: 2.48 RATIO
LYMPHOCYTES # BLD AUTO: 2.03 K/UL
LYMPHOCYTES NFR BLD AUTO: 44.5 %
MAN DIFF?: NORMAL
MCHC RBC-ENTMCNC: 29.4 PG
MCHC RBC-ENTMCNC: 32.9 GM/DL
MCV RBC AUTO: 89.4 FL
MONOCYTES # BLD AUTO: 0.4 K/UL
MONOCYTES NFR BLD AUTO: 8.8 %
NEUTROPHILS # BLD AUTO: 1.84 K/UL
NEUTROPHILS NFR BLD AUTO: 40.3 %
PLATELET # BLD AUTO: 186 K/UL
PT BLD: 28.6 SEC
RBC # BLD: 4.35 M/UL
RBC # FLD: 14.9 %
WBC # FLD AUTO: 4.56 K/UL

## 2018-07-05 PROCEDURE — 99214 OFFICE O/P EST MOD 30 MIN: CPT

## 2018-07-05 PROCEDURE — G0463: CPT

## 2018-07-05 PROCEDURE — ZZZZZ: CPT

## 2018-07-06 LAB — LDH SERPL-CCNC: 332 U/L

## 2018-07-11 ENCOUNTER — RESULT REVIEW (OUTPATIENT)
Age: 63
End: 2018-07-11

## 2018-07-12 ENCOUNTER — APPOINTMENT (OUTPATIENT)
Dept: CARDIOLOGY | Facility: CLINIC | Age: 63
End: 2018-07-12
Payer: MEDICAID

## 2018-07-12 VITALS
OXYGEN SATURATION: 98 % | HEIGHT: 70 IN | RESPIRATION RATE: 16 BRPM | BODY MASS INDEX: 23.19 KG/M2 | HEART RATE: 64 BPM | DIASTOLIC BLOOD PRESSURE: 70 MMHG | WEIGHT: 162 LBS | SYSTOLIC BLOOD PRESSURE: 100 MMHG

## 2018-07-12 PROCEDURE — 93750 INTERROGATION VAD IN PERSON: CPT

## 2018-07-12 PROCEDURE — 99215 OFFICE O/P EST HI 40 MIN: CPT | Mod: 25

## 2018-07-22 PROBLEM — Z78.9 ALCOHOL USE: Status: INACTIVE | Noted: 2017-11-30

## 2018-07-24 ENCOUNTER — MESSAGE (OUTPATIENT)
Age: 63
End: 2018-07-24

## 2018-07-24 DIAGNOSIS — L03.90 CELLULITIS, UNSPECIFIED: ICD-10-CM

## 2018-07-24 DIAGNOSIS — B97.89 OTHER VIRAL AGENTS AS THE CAUSE OF DISEASES CLASSIFIED ELSEWHERE: ICD-10-CM

## 2018-07-25 ENCOUNTER — OTHER (OUTPATIENT)
Age: 63
End: 2018-07-25

## 2018-07-25 PROBLEM — J90 PLEURAL EFFUSION, NOT ELSEWHERE CLASSIFIED: Chronic | Status: ACTIVE | Noted: 2017-12-04

## 2018-07-25 PROBLEM — I25.10 ATHEROSCLEROTIC HEART DISEASE OF NATIVE CORONARY ARTERY WITHOUT ANGINA PECTORIS: Chronic | Status: ACTIVE | Noted: 2017-12-04

## 2018-07-25 PROBLEM — F32.9 MAJOR DEPRESSIVE DISORDER, SINGLE EPISODE, UNSPECIFIED: Chronic | Status: ACTIVE | Noted: 2017-12-04

## 2018-07-25 PROBLEM — I50.9 HEART FAILURE, UNSPECIFIED: Chronic | Status: ACTIVE | Noted: 2017-12-04

## 2018-07-26 ENCOUNTER — NON-APPOINTMENT (OUTPATIENT)
Age: 63
End: 2018-07-26

## 2018-07-26 ENCOUNTER — APPOINTMENT (OUTPATIENT)
Dept: INFECTIOUS DISEASE | Facility: HOSPITAL | Age: 63
End: 2018-07-26
Payer: MEDICAID

## 2018-07-26 ENCOUNTER — APPOINTMENT (OUTPATIENT)
Dept: CARDIOLOGY | Facility: CLINIC | Age: 63
End: 2018-07-26
Payer: MEDICAID

## 2018-07-26 VITALS
RESPIRATION RATE: 18 BRPM | DIASTOLIC BLOOD PRESSURE: 75 MMHG | TEMPERATURE: 97.34 F | SYSTOLIC BLOOD PRESSURE: 105 MMHG | HEART RATE: 71 BPM | OXYGEN SATURATION: 98 %

## 2018-07-26 LAB
BASOPHILS # BLD AUTO: 0.04 K/UL
BASOPHILS NFR BLD AUTO: 1.1 %
EOSINOPHIL # BLD AUTO: 0.15 K/UL
EOSINOPHIL NFR BLD AUTO: 4 %
HCT VFR BLD CALC: 37.7 %
HGB BLD-MCNC: 12.6 G/DL
IMM GRANULOCYTES NFR BLD AUTO: 0 %
INR PPP: 2.54 RATIO
LYMPHOCYTES # BLD AUTO: 1.53 K/UL
LYMPHOCYTES NFR BLD AUTO: 41.2 %
MAN DIFF?: NORMAL
MCHC RBC-ENTMCNC: 30 PG
MCHC RBC-ENTMCNC: 33.4 GM/DL
MCV RBC AUTO: 89.8 FL
MONOCYTES # BLD AUTO: 0.41 K/UL
MONOCYTES NFR BLD AUTO: 11.1 %
NEUTROPHILS # BLD AUTO: 1.58 K/UL
NEUTROPHILS NFR BLD AUTO: 42.6 %
PLATELET # BLD AUTO: 179 K/UL
PT BLD: 29.2 SEC
RBC # BLD: 4.2 M/UL
RBC # FLD: 14.9 %
WBC # FLD AUTO: 3.71 K/UL

## 2018-07-26 PROCEDURE — XXXXX: CPT

## 2018-07-26 PROCEDURE — 99213 OFFICE O/P EST LOW 20 MIN: CPT

## 2018-07-26 RX ORDER — WARFARIN 10 MG/1
10 TABLET ORAL DAILY
Qty: 90 | Refills: 3 | Status: DISCONTINUED | COMMUNITY
Start: 2018-05-31 | End: 2018-07-26

## 2018-07-26 RX ORDER — TROSPIUM CHLORIDE 20 MG/1
20 TABLET, FILM COATED ORAL
Qty: 90 | Refills: 0 | Status: COMPLETED | COMMUNITY
Start: 2018-06-28 | End: 2018-07-26

## 2018-07-30 LAB
ALBUMIN SERPL ELPH-MCNC: 4.2 G/DL
ALP BLD-CCNC: 79 U/L
ALT SERPL-CCNC: 13 U/L
ANION GAP SERPL CALC-SCNC: 12 MMOL/L
AST SERPL-CCNC: 24 U/L
BILIRUB SERPL-MCNC: <0.2 MG/DL
BUN SERPL-MCNC: 9 MG/DL
CALCIUM SERPL-MCNC: 9 MG/DL
CHLORIDE SERPL-SCNC: 102 MMOL/L
CO2 SERPL-SCNC: 24 MMOL/L
CREAT SERPL-MCNC: 0.83 MG/DL
GLUCOSE SERPL-MCNC: 113 MG/DL
LDH SERPL-CCNC: 283 U/L
POTASSIUM SERPL-SCNC: 4 MMOL/L
PROT SERPL-MCNC: 7.9 G/DL
SODIUM SERPL-SCNC: 138 MMOL/L

## 2018-08-08 ENCOUNTER — RESULT REVIEW (OUTPATIENT)
Age: 63
End: 2018-08-08

## 2018-08-09 ENCOUNTER — OUTPATIENT (OUTPATIENT)
Dept: OUTPATIENT SERVICES | Facility: HOSPITAL | Age: 63
LOS: 1 days | End: 2018-08-09
Payer: MEDICAID

## 2018-08-09 ENCOUNTER — APPOINTMENT (OUTPATIENT)
Dept: CV DIAGNOSITCS | Facility: HOSPITAL | Age: 63
End: 2018-08-09

## 2018-08-09 ENCOUNTER — RX CHANGE (OUTPATIENT)
Age: 63
End: 2018-08-09

## 2018-08-09 ENCOUNTER — APPOINTMENT (OUTPATIENT)
Dept: CARDIOLOGY | Facility: CLINIC | Age: 63
End: 2018-08-09
Payer: MEDICAID

## 2018-08-09 VITALS
WEIGHT: 157 LBS | DIASTOLIC BLOOD PRESSURE: 71 MMHG | HEIGHT: 70 IN | BODY MASS INDEX: 22.48 KG/M2 | SYSTOLIC BLOOD PRESSURE: 101 MMHG | HEART RATE: 70 BPM | OXYGEN SATURATION: 98 % | RESPIRATION RATE: 16 BRPM

## 2018-08-09 DIAGNOSIS — Z95.811 PRESENCE OF HEART ASSIST DEVICE: Chronic | ICD-10-CM

## 2018-08-09 DIAGNOSIS — Z98.890 OTHER SPECIFIED POSTPROCEDURAL STATES: Chronic | ICD-10-CM

## 2018-08-09 DIAGNOSIS — Z95.811 PRESENCE OF HEART ASSIST DEVICE: ICD-10-CM

## 2018-08-09 LAB
ALBUMIN SERPL ELPH-MCNC: 4.4 G/DL
ALP BLD-CCNC: 82 U/L
ALT SERPL-CCNC: 15 U/L
ANION GAP SERPL CALC-SCNC: 10 MMOL/L
AST SERPL-CCNC: 26 U/L
BASOPHILS # BLD AUTO: 0 K/UL
BASOPHILS NFR BLD AUTO: 0.8 %
BILIRUB SERPL-MCNC: 0.3 MG/DL
BUN SERPL-MCNC: 11 MG/DL
CALCIUM SERPL-MCNC: 9 MG/DL
CHLORIDE SERPL-SCNC: 100 MMOL/L
CO2 SERPL-SCNC: 24 MMOL/L
CREAT SERPL-MCNC: 0.95 MG/DL
EOSINOPHIL # BLD AUTO: 0.3 K/UL
EOSINOPHIL NFR BLD AUTO: 5.8 %
GLUCOSE SERPL-MCNC: 102 MG/DL
HCT VFR BLD CALC: 37.7 %
HGB BLD-MCNC: 12.6 G/DL
INR PPP: 2.87 RATIO
LYMPHOCYTES # BLD AUTO: 1.8 K/UL
LYMPHOCYTES NFR BLD AUTO: 40.5 %
MAN DIFF?: NO
MCHC RBC-ENTMCNC: 30.3 PG
MCHC RBC-ENTMCNC: 33.6 GM/DL
MCV RBC AUTO: 90.3 FL
MONOCYTES # BLD AUTO: 0.5 K/UL
MONOCYTES NFR BLD AUTO: 10.2 %
NEUTROPHILS # BLD AUTO: 1.9 K/UL
NEUTROPHILS NFR BLD AUTO: 42.7 %
PLATELET # BLD AUTO: 154 K/UL
POTASSIUM SERPL-SCNC: 3.7 MMOL/L
PROT SERPL-MCNC: 7.9 G/DL
PT BLD: 31.7 SEC
RBC # BLD: 4.17 M/UL
RBC # FLD: 14.1 %
SODIUM SERPL-SCNC: 134 MMOL/L
WBC # FLD AUTO: 4.5 K/UL

## 2018-08-09 PROCEDURE — 99215 OFFICE O/P EST HI 40 MIN: CPT | Mod: 25

## 2018-08-09 PROCEDURE — 93306 TTE W/DOPPLER COMPLETE: CPT

## 2018-08-09 PROCEDURE — 93306 TTE W/DOPPLER COMPLETE: CPT | Mod: 26

## 2018-08-09 PROCEDURE — 93750 INTERROGATION VAD IN PERSON: CPT

## 2018-08-13 ENCOUNTER — APPOINTMENT (OUTPATIENT)
Dept: UROLOGY | Facility: CLINIC | Age: 63
End: 2018-08-13

## 2018-08-13 LAB — LDH SERPL-CCNC: 413 U/L

## 2018-08-16 ENCOUNTER — RESULT REVIEW (OUTPATIENT)
Age: 63
End: 2018-08-16

## 2018-08-23 ENCOUNTER — APPOINTMENT (OUTPATIENT)
Dept: CARDIOLOGY | Facility: CLINIC | Age: 63
End: 2018-08-23
Payer: MEDICAID

## 2018-08-23 ENCOUNTER — APPOINTMENT (OUTPATIENT)
Dept: ULTRASOUND IMAGING | Facility: HOSPITAL | Age: 63
End: 2018-08-23

## 2018-08-23 ENCOUNTER — OUTPATIENT (OUTPATIENT)
Dept: OUTPATIENT SERVICES | Facility: HOSPITAL | Age: 63
LOS: 1 days | End: 2018-08-23
Payer: MEDICAID

## 2018-08-23 ENCOUNTER — OTHER (OUTPATIENT)
Age: 63
End: 2018-08-23

## 2018-08-23 VITALS — DIASTOLIC BLOOD PRESSURE: 79 MMHG | SYSTOLIC BLOOD PRESSURE: 114 MMHG

## 2018-08-23 VITALS
DIASTOLIC BLOOD PRESSURE: 77 MMHG | HEIGHT: 70 IN | BODY MASS INDEX: 24.48 KG/M2 | WEIGHT: 171 LBS | OXYGEN SATURATION: 98 % | HEART RATE: 74 BPM | SYSTOLIC BLOOD PRESSURE: 106 MMHG

## 2018-08-23 VITALS — OXYGEN SATURATION: 99 % | HEART RATE: 66 BPM

## 2018-08-23 DIAGNOSIS — Z98.890 OTHER SPECIFIED POSTPROCEDURAL STATES: Chronic | ICD-10-CM

## 2018-08-23 DIAGNOSIS — I82.409 ACUTE EMBOLISM AND THROMBOSIS OF UNSPECIFIED DEEP VEINS OF UNSPECIFIED LOWER EXTREMITY: ICD-10-CM

## 2018-08-23 DIAGNOSIS — Z95.811 PRESENCE OF HEART ASSIST DEVICE: Chronic | ICD-10-CM

## 2018-08-23 LAB
ALBUMIN SERPL ELPH-MCNC: 4.4 G/DL
ALP BLD-CCNC: 88 U/L
ALT SERPL-CCNC: 14 U/L
ANION GAP SERPL CALC-SCNC: 10 MMOL/L
AST SERPL-CCNC: 26 U/L
BASOPHILS # BLD AUTO: 0.04 K/UL
BASOPHILS NFR BLD AUTO: 1 %
BILIRUB SERPL-MCNC: 0.3 MG/DL
BUN SERPL-MCNC: 8 MG/DL
CALCIUM SERPL-MCNC: 9.3 MG/DL
CHLORIDE SERPL-SCNC: 101 MMOL/L
CO2 SERPL-SCNC: 25 MMOL/L
CREAT SERPL-MCNC: 0.95 MG/DL
EOSINOPHIL # BLD AUTO: 0.19 K/UL
EOSINOPHIL NFR BLD AUTO: 4.2 %
GLUCOSE SERPL-MCNC: 91 MG/DL
HCT VFR BLD CALC: 39.9 %
HGB BLD-MCNC: 12.9 G/DL
INR PPP: 2.44 RATIO
LDH SERPL-CCNC: 298 U/L
LYMPHOCYTES # BLD AUTO: 1.86 K/UL
LYMPHOCYTES NFR BLD AUTO: 41.3 %
MAN DIFF?: NO
MCHC RBC-ENTMCNC: 29.2 PG
MCHC RBC-ENTMCNC: 32.2 GM/DL
MCV RBC AUTO: 90.5 FL
MONOCYTES # BLD AUTO: 0.43 K/UL
MONOCYTES NFR BLD AUTO: 9.5 %
NEUTROPHILS # BLD AUTO: 1.99 K/UL
NEUTROPHILS NFR BLD AUTO: 44.1 %
PLATELET # BLD AUTO: 152 K/UL
POTASSIUM SERPL-SCNC: 3.7 MMOL/L
PROT SERPL-MCNC: 8 G/DL
PT BLD: 26.8 SEC
RBC # BLD: 4.41 M/UL
RBC # FLD: 14.5 %
SODIUM SERPL-SCNC: 136 MMOL/L
WBC # FLD AUTO: 4.51 K/UL

## 2018-08-23 PROCEDURE — 93925 LOWER EXTREMITY STUDY: CPT | Mod: 26

## 2018-08-23 PROCEDURE — 93925 LOWER EXTREMITY STUDY: CPT

## 2018-08-23 PROCEDURE — 93923 UPR/LXTR ART STDY 3+ LVLS: CPT | Mod: 26

## 2018-08-23 PROCEDURE — 99215 OFFICE O/P EST HI 40 MIN: CPT | Mod: 25

## 2018-08-23 PROCEDURE — 93923 UPR/LXTR ART STDY 3+ LVLS: CPT

## 2018-08-23 PROCEDURE — 93750 INTERROGATION VAD IN PERSON: CPT

## 2018-08-30 ENCOUNTER — RESULT REVIEW (OUTPATIENT)
Age: 63
End: 2018-08-30

## 2018-09-05 ENCOUNTER — RESULT REVIEW (OUTPATIENT)
Age: 63
End: 2018-09-05

## 2018-09-06 ENCOUNTER — APPOINTMENT (OUTPATIENT)
Dept: CARDIOLOGY | Facility: CLINIC | Age: 63
End: 2018-09-06
Payer: MEDICAID

## 2018-09-06 VITALS
SYSTOLIC BLOOD PRESSURE: 102 MMHG | WEIGHT: 171 LBS | HEART RATE: 66 BPM | OXYGEN SATURATION: 99 % | BODY MASS INDEX: 24.48 KG/M2 | DIASTOLIC BLOOD PRESSURE: 71 MMHG | TEMPERATURE: 201.6 F | HEIGHT: 70 IN

## 2018-09-06 PROCEDURE — 99214 OFFICE O/P EST MOD 30 MIN: CPT | Mod: 25

## 2018-09-06 PROCEDURE — 93750 INTERROGATION VAD IN PERSON: CPT

## 2018-09-06 RX ORDER — CEPHALEXIN 500 MG/1
500 CAPSULE ORAL 3 TIMES DAILY
Qty: 42 | Refills: 3 | Status: DISCONTINUED | COMMUNITY
Start: 2018-07-26 | End: 2018-09-06

## 2018-09-06 RX ORDER — CEPHALEXIN 500 MG/1
500 CAPSULE ORAL 3 TIMES DAILY
Qty: 21 | Refills: 0 | Status: DISCONTINUED | COMMUNITY
Start: 2018-07-05 | End: 2018-09-06

## 2018-09-14 ENCOUNTER — RESULT REVIEW (OUTPATIENT)
Age: 63
End: 2018-09-14

## 2018-09-19 ENCOUNTER — RESULT REVIEW (OUTPATIENT)
Age: 63
End: 2018-09-19

## 2018-09-19 ENCOUNTER — RX RENEWAL (OUTPATIENT)
Age: 63
End: 2018-09-19

## 2018-09-26 ENCOUNTER — RESULT REVIEW (OUTPATIENT)
Age: 63
End: 2018-09-26

## 2018-10-02 ENCOUNTER — APPOINTMENT (OUTPATIENT)
Dept: CARDIOLOGY | Facility: CLINIC | Age: 63
End: 2018-10-02
Payer: MEDICAID

## 2018-10-02 ENCOUNTER — RESULT REVIEW (OUTPATIENT)
Age: 63
End: 2018-10-02

## 2018-10-02 ENCOUNTER — APPOINTMENT (OUTPATIENT)
Dept: CARDIOTHORACIC SURGERY | Facility: CLINIC | Age: 63
End: 2018-10-02

## 2018-10-02 VITALS — OXYGEN SATURATION: 99 % | DIASTOLIC BLOOD PRESSURE: 78 MMHG | HEART RATE: 62 BPM | SYSTOLIC BLOOD PRESSURE: 108 MMHG

## 2018-10-02 LAB
ALBUMIN SERPL ELPH-MCNC: 4.3 G/DL
ALP BLD-CCNC: 87 U/L
ALT SERPL-CCNC: 17 U/L
ANION GAP SERPL CALC-SCNC: 12 MMOL/L
AST SERPL-CCNC: 27 U/L
BASOPHILS # BLD AUTO: 0.03 K/UL
BASOPHILS NFR BLD AUTO: 0.7 %
BILIRUB SERPL-MCNC: 0.2 MG/DL
BUN SERPL-MCNC: 7 MG/DL
CALCIUM SERPL-MCNC: 9.3 MG/DL
CHLORIDE SERPL-SCNC: 102 MMOL/L
CO2 SERPL-SCNC: 27 MMOL/L
CREAT SERPL-MCNC: 0.99 MG/DL
EOSINOPHIL # BLD AUTO: 0.32 K/UL
EOSINOPHIL NFR BLD AUTO: 7.1 %
GLUCOSE SERPL-MCNC: 103 MG/DL
HCT VFR BLD CALC: 37.5 %
HGB BLD-MCNC: 12.2 G/DL
IMM GRANULOCYTES NFR BLD AUTO: 0.2 %
INR PPP: 2.76 RATIO
LDH SERPL-CCNC: 295 U/L
LYMPHOCYTES # BLD AUTO: 1.82 K/UL
LYMPHOCYTES NFR BLD AUTO: 40.4 %
MAN DIFF?: NORMAL
MCHC RBC-ENTMCNC: 29.8 PG
MCHC RBC-ENTMCNC: 32.5 GM/DL
MCV RBC AUTO: 91.7 FL
MONOCYTES # BLD AUTO: 0.44 K/UL
MONOCYTES NFR BLD AUTO: 9.8 %
NEUTROPHILS # BLD AUTO: 1.88 K/UL
NEUTROPHILS NFR BLD AUTO: 41.8 %
PLATELET # BLD AUTO: 163 K/UL
POTASSIUM SERPL-SCNC: 3.7 MMOL/L
PROT SERPL-MCNC: 7.7 G/DL
PT BLD: 31.8 SEC
RBC # BLD: 4.09 M/UL
RBC # FLD: 14.6 %
SODIUM SERPL-SCNC: 141 MMOL/L
WBC # FLD AUTO: 4.5 K/UL

## 2018-10-02 PROCEDURE — 99214 OFFICE O/P EST MOD 30 MIN: CPT

## 2018-10-04 ENCOUNTER — APPOINTMENT (OUTPATIENT)
Dept: CV DIAGNOSITCS | Facility: HOSPITAL | Age: 63
End: 2018-10-04

## 2018-10-04 ENCOUNTER — APPOINTMENT (OUTPATIENT)
Dept: CARDIOLOGY | Facility: CLINIC | Age: 63
End: 2018-10-04

## 2018-10-04 ENCOUNTER — RX RENEWAL (OUTPATIENT)
Age: 63
End: 2018-10-04

## 2018-10-11 ENCOUNTER — NON-APPOINTMENT (OUTPATIENT)
Age: 63
End: 2018-10-11

## 2018-10-11 ENCOUNTER — APPOINTMENT (OUTPATIENT)
Dept: CARDIOLOGY | Facility: CLINIC | Age: 63
End: 2018-10-11
Payer: MEDICAID

## 2018-10-11 VITALS
TEMPERATURE: 94.5 F | HEIGHT: 70 IN | SYSTOLIC BLOOD PRESSURE: 100 MMHG | OXYGEN SATURATION: 96 % | HEART RATE: 77 BPM | BODY MASS INDEX: 23.77 KG/M2 | DIASTOLIC BLOOD PRESSURE: 64 MMHG | WEIGHT: 166 LBS

## 2018-10-11 PROCEDURE — 93000 ELECTROCARDIOGRAM COMPLETE: CPT

## 2018-10-11 PROCEDURE — 99215 OFFICE O/P EST HI 40 MIN: CPT | Mod: 25

## 2018-10-24 ENCOUNTER — RESULT REVIEW (OUTPATIENT)
Age: 63
End: 2018-10-24

## 2018-10-25 ENCOUNTER — OTHER (OUTPATIENT)
Age: 63
End: 2018-10-25

## 2018-10-31 ENCOUNTER — RESULT REVIEW (OUTPATIENT)
Age: 63
End: 2018-10-31

## 2018-11-01 ENCOUNTER — APPOINTMENT (OUTPATIENT)
Dept: CT IMAGING | Facility: HOSPITAL | Age: 63
End: 2018-11-01

## 2018-11-01 ENCOUNTER — OUTPATIENT (OUTPATIENT)
Dept: OUTPATIENT SERVICES | Facility: HOSPITAL | Age: 63
LOS: 1 days | End: 2018-11-01
Payer: MEDICAID

## 2018-11-01 DIAGNOSIS — Z00.00 ENCOUNTER FOR GENERAL ADULT MEDICAL EXAMINATION WITHOUT ABNORMAL FINDINGS: ICD-10-CM

## 2018-11-01 DIAGNOSIS — Z95.811 PRESENCE OF HEART ASSIST DEVICE: Chronic | ICD-10-CM

## 2018-11-01 DIAGNOSIS — Z98.890 OTHER SPECIFIED POSTPROCEDURAL STATES: Chronic | ICD-10-CM

## 2018-11-01 DIAGNOSIS — I73.9 PERIPHERAL VASCULAR DISEASE, UNSPECIFIED: ICD-10-CM

## 2018-11-01 PROCEDURE — 75635 CT ANGIO ABDOMINAL ARTERIES: CPT | Mod: 26

## 2018-11-01 PROCEDURE — 75635 CT ANGIO ABDOMINAL ARTERIES: CPT

## 2018-11-15 ENCOUNTER — NON-APPOINTMENT (OUTPATIENT)
Age: 63
End: 2018-11-15

## 2018-11-15 ENCOUNTER — APPOINTMENT (OUTPATIENT)
Dept: CARDIOLOGY | Facility: CLINIC | Age: 63
End: 2018-11-15
Payer: MEDICAID

## 2018-11-15 ENCOUNTER — APPOINTMENT (OUTPATIENT)
Dept: CV DIAGNOSITCS | Facility: HOSPITAL | Age: 63
End: 2018-11-15

## 2018-11-15 ENCOUNTER — OUTPATIENT (OUTPATIENT)
Dept: OUTPATIENT SERVICES | Facility: HOSPITAL | Age: 63
LOS: 1 days | End: 2018-11-15
Payer: MEDICAID

## 2018-11-15 VITALS — WEIGHT: 167 LBS | HEIGHT: 70 IN | BODY MASS INDEX: 23.91 KG/M2 | OXYGEN SATURATION: 99 % | HEART RATE: 64 BPM

## 2018-11-15 DIAGNOSIS — Z95.811 PRESENCE OF HEART ASSIST DEVICE: ICD-10-CM

## 2018-11-15 DIAGNOSIS — Z95.811 PRESENCE OF HEART ASSIST DEVICE: Chronic | ICD-10-CM

## 2018-11-15 DIAGNOSIS — Z98.890 OTHER SPECIFIED POSTPROCEDURAL STATES: Chronic | ICD-10-CM

## 2018-11-15 LAB
ALBUMIN SERPL ELPH-MCNC: 4.6 G/DL
ALP BLD-CCNC: 90 U/L
ALT SERPL-CCNC: 20 U/L
ANION GAP SERPL CALC-SCNC: 11 MMOL/L
AST SERPL-CCNC: 33 U/L
BASOPHILS # BLD AUTO: 0.04 K/UL
BASOPHILS NFR BLD AUTO: 0.8 %
BILIRUB SERPL-MCNC: 0.2 MG/DL
BUN SERPL-MCNC: 12 MG/DL
CALCIUM SERPL-MCNC: 9.6 MG/DL
CHLORIDE SERPL-SCNC: 100 MMOL/L
CO2 SERPL-SCNC: 26 MMOL/L
CREAT SERPL-MCNC: 1.03 MG/DL
EOSINOPHIL # BLD AUTO: 0.25 K/UL
EOSINOPHIL NFR BLD AUTO: 5.1 %
GLUCOSE SERPL-MCNC: 88 MG/DL
HCT VFR BLD CALC: 42.2 %
HGB BLD-MCNC: 13.8 G/DL
IMM GRANULOCYTES NFR BLD AUTO: 0.2 %
INR PPP: 2.69 RATIO
LDH SERPL-CCNC: 312 U/L
LYMPHOCYTES # BLD AUTO: 2.19 K/UL
LYMPHOCYTES NFR BLD AUTO: 44.8 %
MAN DIFF?: NORMAL
MCHC RBC-ENTMCNC: 30.2 PG
MCHC RBC-ENTMCNC: 32.7 GM/DL
MCV RBC AUTO: 92.3 FL
MONOCYTES # BLD AUTO: 0.5 K/UL
MONOCYTES NFR BLD AUTO: 10.2 %
NEUTROPHILS # BLD AUTO: 1.9 K/UL
NEUTROPHILS NFR BLD AUTO: 38.9 %
PLATELET # BLD AUTO: 195 K/UL
POTASSIUM SERPL-SCNC: 4.7 MMOL/L
PROT SERPL-MCNC: 8.1 G/DL
PT BLD: 31.1 SEC
RBC # BLD: 4.57 M/UL
RBC # FLD: 14.4 %
SODIUM SERPL-SCNC: 137 MMOL/L
WBC # FLD AUTO: 4.89 K/UL

## 2018-11-15 PROCEDURE — 93750 INTERROGATION VAD IN PERSON: CPT

## 2018-11-15 PROCEDURE — 93000 ELECTROCARDIOGRAM COMPLETE: CPT

## 2018-11-15 PROCEDURE — 99214 OFFICE O/P EST MOD 30 MIN: CPT

## 2018-11-15 PROCEDURE — 93306 TTE W/DOPPLER COMPLETE: CPT

## 2018-11-15 PROCEDURE — 93306 TTE W/DOPPLER COMPLETE: CPT | Mod: 26

## 2018-11-16 NOTE — PHYSICAL EXAM
[General Appearance - Well Developed] : well developed [General Appearance - Well Nourished] : well nourished [Normal Conjunctiva] : the conjunctiva exhibited no abnormalities [Normal Oral Mucosa] : normal oral mucosa [Bowel Sounds] : normal bowel sounds [Abdomen Soft] : soft [Abdomen Tenderness] : non-tender [Abnormal Walk] : normal gait [Nail Clubbing] : no clubbing of the fingernails [] : no rash [Oriented To Time, Place, And Person] : oriented to person, place, and time [FreeTextEntry1] : +hum of LVAD

## 2018-11-16 NOTE — ASSESSMENT
[FreeTextEntry1] : Mr. Quispe is a 63 year old man with ACC/AHA stage D systolic heart failure due to a nonischemic cardiomyopathy, s/p HM2 LVAD with tricuspid valve ring on 9/12/2017. There is hope that he will have recovery of his myocardium with ongoing uptitration of his neurohormonal antagonists, which he has thus far demonstrated. He is currently not listed. He is without evidence of pump malfunction or heart failure on exam. He was recently treated for a driveline infection (Corynebacterium), which appears to have resolved. Has been having RLE cramping and was found to have b/l peripheral arterial disease (R>L) with significant stenosis at R SFA level, awaiting a CTA of his LE. Main complaint is abdominal distention which is likely 2/2 gastritis.

## 2018-11-16 NOTE — HISTORY OF PRESENT ILLNESS
[FreeTextEntry1] : Mr. Quispe is a 63 year old man with ACC/AHA stage D systolic heart failure due to a nonischemic cardiomyopathy who presented with abdominal pain and cardiogenic shock in the fall of 2017. Due to inotropic dependence, he underwent HM2 LVAD implantation with TV annuloplasty ring on 9/12/2017. His post-operative course was largely uncomplicated. He had mild rectal bleeding due to internal hemorrhoids. He had one readmission due to infection of unclear etiology in December 2017. It was not thought to be related to the LVAD. \par \par He denies any chest pain/SOB/orthpnea, PND. He does reports some cramping in his legs after ambulating 10 minutes which resolves with rest. He had vascular studies since last visit which showed b/l LE arterial disease at aorto-iliac levels and R fem-pop disease. Dopples showed b/l peroneal artery is occluded and R SFA has a focal occlusion. Was seen by Dr. Lovelace who recommended CTA of LE which is pending. \par \par He has not had evidence of bleeding ie; no epistaxis, BRBPR, or melena. He is taking his medications as directed. He has not been admitted to the hospital or seen in the ER for HF in the interim. He completed a course of antibiotics for 1 month. \par \par LVAD Interrogation:\par Device Type: HM2\par Speed: 9000\par Flow: 4.8\par Power: 5.1\par PI: 7.4\par MAP: 80\par Events: Occasional PI events w/o speed drops. \par No programming changes made

## 2018-11-16 NOTE — REASON FOR VISIT
[Follow-Up - Clinic] : a clinic follow-up of [Cardiomyopathy] : cardiomyopathy [Heart Failure] : congestive heart failure [FreeTextEntry1] : LVAD

## 2018-11-16 NOTE — DISCUSSION/SUMMARY
[Patient] : the patient [___ Week(s)] : [unfilled] week(s) [FreeTextEntry1] : - Congestive heart failure/Nonischemic cardiomyopathy:continue Metoprolol 200 mg daily. Continue lisinopril 20 mg twice daily, spironolactone 25 mg daily. Will repeat TTE in November. \par - LVAD: Continue current speed. \par - Anticoagulation: INR is 2.69 he was instructed to continue to take 5 mg of warfarin every night or goal 2- 2.5. Continue aspirin at 81 mg daily.\par - Leg crampsImproved; CTA done, awaiting input from Dr. Lovelace\par Education of LVAD system maintenance was reviewed with patient.\par - Supplies: Alere.\par - Backup battery: -695 replace in 17 months.\par

## 2018-11-23 ENCOUNTER — RESULT REVIEW (OUTPATIENT)
Age: 63
End: 2018-11-23

## 2018-11-27 ENCOUNTER — RESULT REVIEW (OUTPATIENT)
Age: 63
End: 2018-11-27

## 2018-12-05 ENCOUNTER — RESULT REVIEW (OUTPATIENT)
Age: 63
End: 2018-12-05

## 2018-12-12 ENCOUNTER — RESULT REVIEW (OUTPATIENT)
Age: 63
End: 2018-12-12

## 2018-12-17 ENCOUNTER — OTHER (OUTPATIENT)
Age: 63
End: 2018-12-17

## 2018-12-20 ENCOUNTER — APPOINTMENT (OUTPATIENT)
Dept: CARDIOLOGY | Facility: CLINIC | Age: 63
End: 2018-12-20
Payer: MEDICAID

## 2018-12-20 ENCOUNTER — NON-APPOINTMENT (OUTPATIENT)
Age: 63
End: 2018-12-20

## 2018-12-20 VITALS
TEMPERATURE: 97.34 F | OXYGEN SATURATION: 100 % | HEIGHT: 70 IN | WEIGHT: 162 LBS | HEART RATE: 115 BPM | BODY MASS INDEX: 23.19 KG/M2

## 2018-12-20 LAB
ALBUMIN SERPL ELPH-MCNC: 4.6 G/DL
ALP BLD-CCNC: 99 U/L
ALT SERPL-CCNC: 9 U/L
ANION GAP SERPL CALC-SCNC: 10 MMOL/L
AST SERPL-CCNC: 27 U/L
BASOPHILS # BLD AUTO: 0.05 K/UL
BASOPHILS NFR BLD AUTO: 1.1 %
BILIRUB SERPL-MCNC: 0.2 MG/DL
BUN SERPL-MCNC: 21 MG/DL
CALCIUM SERPL-MCNC: 9.7 MG/DL
CHLORIDE SERPL-SCNC: 103 MMOL/L
CO2 SERPL-SCNC: 23 MMOL/L
CREAT SERPL-MCNC: 1.46 MG/DL
EOSINOPHIL # BLD AUTO: 0.27 K/UL
EOSINOPHIL NFR BLD AUTO: 5.7 %
GLUCOSE SERPL-MCNC: 83 MG/DL
HCT VFR BLD CALC: 38.2 %
HGB BLD-MCNC: 12.5 G/DL
IMM GRANULOCYTES NFR BLD AUTO: 0.2 %
LDH SERPL-CCNC: 339 U/L
LYMPHOCYTES # BLD AUTO: 2.14 K/UL
LYMPHOCYTES NFR BLD AUTO: 45.3 %
MAN DIFF?: NORMAL
MCHC RBC-ENTMCNC: 30 PG
MCHC RBC-ENTMCNC: 32.7 GM/DL
MCV RBC AUTO: 91.6 FL
MONOCYTES # BLD AUTO: 0.5 K/UL
MONOCYTES NFR BLD AUTO: 10.6 %
NEUTROPHILS # BLD AUTO: 1.75 K/UL
NEUTROPHILS NFR BLD AUTO: 37.1 %
PLATELET # BLD AUTO: 206 K/UL
POTASSIUM SERPL-SCNC: 4.9 MMOL/L
PROT SERPL-MCNC: 8.8 G/DL
RBC # BLD: 4.17 M/UL
RBC # FLD: 14.2 %
SODIUM SERPL-SCNC: 136 MMOL/L
WBC # FLD AUTO: 4.72 K/UL

## 2018-12-20 PROCEDURE — 93000 ELECTROCARDIOGRAM COMPLETE: CPT

## 2018-12-20 PROCEDURE — 99215 OFFICE O/P EST HI 40 MIN: CPT

## 2018-12-20 RX ORDER — SIMETHICONE 40MG/0.6ML
400-400-40 SUSPENSION, DROPS(FINAL DOSAGE FORM)(ML) ORAL
Qty: 1 | Refills: 0 | Status: DISCONTINUED | COMMUNITY
Start: 2018-10-11 | End: 2018-12-20

## 2018-12-20 RX ORDER — WARFARIN SODIUM 7.5 MG/1
7.5 TABLET ORAL
Refills: 0 | Status: DISCONTINUED | COMMUNITY
Start: 2017-12-11 | End: 2018-12-20

## 2019-01-02 NOTE — HISTORY OF PRESENT ILLNESS
[FreeTextEntry1] : Mr. Quispe is a 63 year old man with ACC/AHA stage D systolic heart failure due to a nonischemic cardiomyopathy who presented with abdominal pain and cardiogenic shock in the fall of 2017. Due to inotropic dependence, he underwent HM2 LVAD implantation with TV annuloplasty ring on 9/12/2017. His post-operative course was largely uncomplicated. He had mild rectal bleeding due to internal hemorrhoids. He had one readmission due to infection of unclear etiology in December 2017. It was not thought to be related to the LVAD. \par \par He denies any chest pain/SOB/orthpnea, PND. He does reports some cramping in his legs after ambulating 10 minutes which resolves with rest. He had vascular studies since last visit which showed b/l LE arterial disease at aorto-iliac levels and R fem-pop disease. Doppler showed b/l peroneal artery is occluded and R SFA has a focal occlusion. CTA of LE was performed on 11/1/18; Dr. Lovelace is following.\par \par He has not had evidence of bleeding ie; no epistaxis, BRBPR, or melena. He is taking his medications as directed. He has not been admitted to the hospital or seen in the ER for HF in the interim. He completed a course of antibiotics for 1 month for a suspected driveline infection (was unable to culture).\par \par He complaints today include R. ear pain and hearing loss and leg pain upon ambulation. He states he has had blood in the ear about a week or so ago. He endorses mild epistaxis, attributes this to air dryness. He denies SOB, dizziness/lightheadedness, dark stool, dark urine. He denies fevers or tenderness at driveline site. \par \par LVAD Interrogation:\par Device Type: HM2\par Speed: 9000\par Flow: 5.4\par Power: 5.1\par PI: 5.1\par MAP: 76\par Events: Occasional PI events w/o speed drops. \par No programming changes made\par Back up battery: 16 months -617

## 2019-01-02 NOTE — ASSESSMENT
[FreeTextEntry1] : Mr. Quispe is a 63 year old man with ACC/AHA stage D systolic heart failure due to a nonischemic cardiomyopathy, s/p HM2 LVAD with tricuspid valve ring on 9/12/2017. There is hope that he will have recovery of his myocardium with ongoing uptitration of his neurohormonal antagonists, which he has thus far demonstrated. He is currently not listed. He is without evidence of pump malfunction or heart failure on exam. He was recently treated for a driveline infection (Corynebacterium), which appears to have resolved. Has been having RLE cramping and was found to have b/l peripheral arterial disease (R>L) with significant stenosis at R SFA level, awaiting a CTA of his LE. He complaints today include R. ear pain and hearing loss and leg pain upon ambulation. \par \par From an LVAD perspective, he appears stable. \par - No VAD setting changes.\par - Patient re-educated on importance of adhering to medications and refilling prescriptions in a timely manner. Will consider increasing medications at next visit to 200mg  metoprolol daily. \par - Provided dressings 3 dressigns to patient; will communicate with Gary so patient can receive dressings at his house and change them more frequently. Will continue to monitor and remain off antibiotics.\par - Patient will see ENT tomorrow (Dr. Allen) for evaluation of hearing loss and R. ear pain.\par - Will f/u with Dr. Lovelace regarding recent test results and any interventions he may recommend.\par - Patient will f/u with his urologist for c/o of nocturia and dysuria. Continue meds.\par - Continue aspirin and warfarin - INR therapeutic.\par

## 2019-01-02 NOTE — PHYSICAL EXAM
[General Appearance - Well Developed] : well developed [General Appearance - Well Nourished] : well nourished [Normal Conjunctiva] : the conjunctiva exhibited no abnormalities [Normal Oral Mucosa] : normal oral mucosa [Bowel Sounds] : normal bowel sounds [Abdomen Soft] : soft [Abdomen Tenderness] : non-tender [Abnormal Walk] : normal gait [Nail Clubbing] : no clubbing of the fingernails [Oriented To Time, Place, And Person] : oriented to person, place, and time [] : no respiratory distress [Respiration, Rhythm And Depth] : normal respiratory rhythm and effort [Exaggerated Use Of Accessory Muscles For Inspiration] : no accessory muscle use [Auscultation Breath Sounds / Voice Sounds] : lungs were clear to auscultation bilaterally [FreeTextEntry1] : RLQ driveline exit site; dressing is not intact; changed in office. Mild serous drainage noted on dressing, no erythema/edema.

## 2019-01-04 ENCOUNTER — RESULT REVIEW (OUTPATIENT)
Age: 64
End: 2019-01-04

## 2019-01-08 ENCOUNTER — APPOINTMENT (OUTPATIENT)
Dept: CARDIOLOGY | Facility: CLINIC | Age: 64
End: 2019-01-08
Payer: MEDICAID

## 2019-01-08 VITALS
WEIGHT: 162 LBS | OXYGEN SATURATION: 99 % | SYSTOLIC BLOOD PRESSURE: 95 MMHG | HEART RATE: 68 BPM | BODY MASS INDEX: 23.24 KG/M2 | DIASTOLIC BLOOD PRESSURE: 70 MMHG

## 2019-01-08 PROCEDURE — 99215 OFFICE O/P EST HI 40 MIN: CPT

## 2019-01-08 NOTE — PHYSICAL EXAM
[General Appearance - Well Developed] : well developed [Normal Appearance] : normal appearance [Well Groomed] : well groomed [General Appearance - Well Nourished] : well nourished [No Deformities] : no deformities [General Appearance - In No Acute Distress] : no acute distress [Normal Conjunctiva] : the conjunctiva exhibited no abnormalities [Eyelids - No Xanthelasma] : the eyelids demonstrated no xanthelasmas [Normal Oral Mucosa] : normal oral mucosa [No Oral Pallor] : no oral pallor [No Oral Cyanosis] : no oral cyanosis [Normal Jugular Venous A Waves Present] : normal jugular venous A waves present [Normal Jugular Venous V Waves Present] : normal jugular venous V waves present [No Jugular Venous Cooper A Waves] : no jugular venous cooper A waves [Respiration, Rhythm And Depth] : normal respiratory rhythm and effort [Exaggerated Use Of Accessory Muscles For Inspiration] : no accessory muscle use [Auscultation Breath Sounds / Voice Sounds] : lungs were clear to auscultation bilaterally [Heart Rate And Rhythm] : heart rate and rhythm were normal [Heart Sounds] : normal S1 and S2 [Murmurs] : no murmurs present [Abdomen Soft] : soft [Abdomen Tenderness] : non-tender [Abdomen Mass (___ Cm)] : no abdominal mass palpated [Abnormal Walk] : normal gait [Gait - Sufficient For Exercise Testing] : the gait was sufficient for exercise testing [Skin Color & Pigmentation] : normal skin color and pigmentation [] : no rash [No Venous Stasis] : no venous stasis [Skin Lesions] : no skin lesions [No Skin Ulcers] : no skin ulcer [No Xanthoma] : no  xanthoma was observed [Oriented To Time, Place, And Person] : oriented to person, place, and time [Affect] : the affect was normal [Mood] : the mood was normal [No Anxiety] : not feeling anxious [FreeTextEntry1] : RLE pedal pulses not palp.  LLE DP is palpable.  Feet are warm.  No tissue loss

## 2019-01-08 NOTE — REASON FOR VISIT
[Follow-Up - Clinic] : a clinic follow-up of [FreeTextEntry1] : 63 year old male non-ischemic cardiomyopathy with LVAD placed 9/12/2017.  Here for vascular evaluation in anticipation for possible heart transplant.  \par \par Vascular testing\par  8/24/2018 LOULOU:  Right 0.85 with TBI 0.5 (pressure of 51)  Left  LOULOU 0.99 with TBI of 0.5  (pressure of 52)\par Flat waveforms on the right distally\par \par 8/24/2018 Arterial duplex: Diffuse atherosclerotic disease seen at the arteries of both \par lower extremities. Diffuse tardus parvus waveforms seen throughout the patent arteries of both \par lower extremities may be indicative of significant bilateral iliac disease. \par Severe stenosis/focal occlusion of the right superficial femoral artery. \par Occluded bilateral peroneal arteries. \par \par CT Angio 8/27/2018\par Showed atherosclerotic plaque in the common iliac arteries.  Moderate on the right, mild on the left. \par \par He complains of R calf pain for the past 2-3 months.  Resolves with rest.  He can walk 2-3 blocks and must stop due to his calf pains.\par \par The left leg (calf) started bothering him recently.  He also develops left calf pain with ambulation\par The RIGHT is worse than the left.  \par \par No prior intervention on the legs.  \par No pain at rest.  No prior tissue loss or amputations.  \par \par

## 2019-01-08 NOTE — ASSESSMENT
[FreeTextEntry1] : Assessment:\par 1.  Peripheral artery disease\par [place] 3 claudication\par Reduced TBI\par Right SFA occlusion\par Likely iliac stenosis \par 2.  CHF\par 3.  LVAD\par 4.  HTN\par 5.  HLD\par \par Plan\par 1. He has diffuse atherosclerotic disease, with symptomatic claudication\par 2.  The Right SFA is occluded, Left SFA disease,  inflow disease in the common iliac arteries (chronic dissection)\par 3.  The initial therapy for symptomatic PAD is OMT and a walking program (see below)\par 4.  In regards to tranplant, he has significant, moderate to severe symptomatic PAD which should be taken into consideration when evaluating patient for transplant.   PAD carries increased morbidity in transplant patients.  \par 5.  for his claudication, would favor a walking program.  Would proceed with intervention only if pain at rest or ulceration (critical limb ischemia, debra 4 or higher).  For now he is debra 2-3.  \par 6.  Continue walking program. \par \par Thanks\par \par Richy Lovelace\par \par \par \par We discussed the importance of a walking program.\par The patient will set aside 40 minutes to walk:\par 1.  Warm up for 5 minutes with gentle stretching\par 2.  Start walking at a speed fast enough to induce claudication symptoms that are mild to moderate\par 3.  Stop and rest until symptoms completely resolve\par 4.  Repeat steps 2 and 3 for 35 minutes.\par 5.  Do this 5 days per week.\par

## 2019-01-08 NOTE — HISTORY OF PRESENT ILLNESS
[FreeTextEntry1] : 1/8/2019 followup visit\par R leg does not bother him.  After walking 3 blocks, he has Left leg pain in the calf.  No pain at at rest.  He is not walking much due to the cold.   \par No pain at rest.  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 \par \par Focal severe narrowing at the midportion of the right femoral artery with \par complete occlusion just distally spanning approximately 1 cm with \par reconstitution distally. Two-vessel runoff at the right calf. \par \par Focal severe narrowing of the distal left femoral artery. Two-vessel runoff \par at the left calf. \par

## 2019-01-09 ENCOUNTER — APPOINTMENT (OUTPATIENT)
Dept: UROLOGY | Facility: CLINIC | Age: 64
End: 2019-01-09

## 2019-01-15 ENCOUNTER — RESULT REVIEW (OUTPATIENT)
Age: 64
End: 2019-01-15

## 2019-01-24 ENCOUNTER — APPOINTMENT (OUTPATIENT)
Dept: CARDIOLOGY | Facility: CLINIC | Age: 64
End: 2019-01-24
Payer: MEDICAID

## 2019-01-24 ENCOUNTER — APPOINTMENT (OUTPATIENT)
Dept: UROLOGY | Facility: CLINIC | Age: 64
End: 2019-01-24

## 2019-01-24 VITALS
HEART RATE: 61 BPM | DIASTOLIC BLOOD PRESSURE: 59 MMHG | OXYGEN SATURATION: 95 % | BODY MASS INDEX: 23.62 KG/M2 | HEIGHT: 70 IN | WEIGHT: 165 LBS | SYSTOLIC BLOOD PRESSURE: 81 MMHG | TEMPERATURE: 93.38 F

## 2019-01-24 PROCEDURE — 99215 OFFICE O/P EST HI 40 MIN: CPT | Mod: 25

## 2019-01-24 PROCEDURE — 93000 ELECTROCARDIOGRAM COMPLETE: CPT

## 2019-01-24 PROCEDURE — 93750 INTERROGATION VAD IN PERSON: CPT

## 2019-01-28 NOTE — ASSESSMENT
[FreeTextEntry1] : Mr. Quispe is a 63 year old man with ACC/AHA stage D systolic heart failure due to a nonischemic cardiomyopathy, s/p HM2 LVAD with tricuspid valve ring on 9/12/2017. There is hope that he will have recovery of his myocardium with ongoing uptitration of his neurohormonal antagonists, which he has thus far demonstrated. He is without evidence of pump malfunction or heart failure on exam. He was recently treated for a driveline infection (Corynebacterium), which appears to have resolved. Has been having RLE cramping and was found to have b/l peripheral arterial disease (R>L) with significant stenosis at R SFA level, as well as left femoral artery with chronic dissection of bilateral common iliacs. Due to severity of PAD, he is currently not a candidate for transplant. \par \par \par

## 2019-01-28 NOTE — PHYSICAL EXAM
[General Appearance - Well Developed] : well developed [General Appearance - Well Nourished] : well nourished [Normal Conjunctiva] : the conjunctiva exhibited no abnormalities [Normal Oral Mucosa] : normal oral mucosa [Auscultation Breath Sounds / Voice Sounds] : lungs were clear to auscultation bilaterally [Bowel Sounds] : normal bowel sounds [Abdomen Soft] : soft [Abdomen Tenderness] : non-tender [Abnormal Walk] : normal gait [Nail Clubbing] : no clubbing of the fingernails [Cyanosis, Localized] : no localized cyanosis [Skin Color & Pigmentation] : normal skin color and pigmentation [] : no rash [Oriented To Time, Place, And Person] : oriented to person, place, and time [FreeTextEntry1] : +hum of LVAD

## 2019-01-28 NOTE — END OF VISIT
[FreeTextEntry3] : I was physically present for the key portions of the evaluation and management (E/M) service provided.  I agree with the above history, physical, and plan which I have reviewed and edited where appropriate.\par

## 2019-01-28 NOTE — HISTORY OF PRESENT ILLNESS
[FreeTextEntry1] : Mr. Quispe is a 63 year old man with ACC/AHA stage D systolic heart failure due to a nonischemic cardiomyopathy who presented with abdominal pain and cardiogenic shock in the fall of 2017. Due to inotropic dependence, he underwent HM2 LVAD implantation with TV annuloplasty ring on 9/12/2017. His post-operative course was largely uncomplicated. He had mild rectal bleeding due to internal hemorrhoids. He had one readmission due to infection of unclear etiology in December 2017. It was not thought to be related to the LVAD. \par \par He does reports some cramping in his legs after ambulating 10 minutes which resolves with rest; states left is worse than right. Dr. Lovelace is following and had recommended a walking program as first-line management. His arterial dopplers showed diffuse atherosclerosis in both extremities and found severe stenosis/focal occlusion of R SFA with occluded bilateral peroneal arteries. A CT angio on 8/27/18 showed chronic dissection of bilateral common iliac arteries with focal severe narrowing of R SFA with complete occlusion and reconstitution distally, as well as focal severe narrowing of distal left femoral artery. Due to severity of PAD, he is currently not a candidate for transplant. \par  \par Today,  He endorses mild epistaxis, attributes this to air dryness. He denies SOB, dizziness/lightheadedness, dark stool, dark urine. He denies fevers or tenderness at driveline site. \par \par LVAD Interrogation:\par Device Type: HM2\par Speed: 9000\par Flow: 4.9\par Power: 5.3\par PI: 7.3\par MAP: 66\par Events: Occasional PI events w/o speed drops. \par No programming changes made\par Back up battery: 15 months -721\par Driveline dressing changed no erythema no drainage noted

## 2019-01-28 NOTE — DISCUSSION/SUMMARY
[Patient] : the patient [___ Month(s)] : [unfilled] month(s) [FreeTextEntry3] : LVAD clinic [FreeTextEntry1] : - No VAD programming changes were made. He is stable as far as it concerns LVAD. Will repeat TTE next month to evaluate for signs of recovery. \par - Diuretics: Continue Spironolactone\par - Antihypertensives: Continue Lisinopril and Metoprolol\par - Antiarrhythmics: None\par - Coumadin: Monitor INR closely and adjust warfarin accordingly, INR goal is 2.0-2.5, INR from 1/21 is 2.1 patient on warfarin 5 mg every night\par - Antiplatelets: Continue aspirin at 81mg every day.\par \par - Education of LVAD system maintenance was reviewed with patient.\par - Supplies: [employer]\par \par \par

## 2019-01-29 ENCOUNTER — RESULT REVIEW (OUTPATIENT)
Age: 64
End: 2019-01-29

## 2019-02-01 ENCOUNTER — RX RENEWAL (OUTPATIENT)
Age: 64
End: 2019-02-01

## 2019-02-05 ENCOUNTER — RESULT REVIEW (OUTPATIENT)
Age: 64
End: 2019-02-05

## 2019-02-06 ENCOUNTER — RX RENEWAL (OUTPATIENT)
Age: 64
End: 2019-02-06

## 2019-02-12 ENCOUNTER — RESULT REVIEW (OUTPATIENT)
Age: 64
End: 2019-02-12

## 2019-02-18 NOTE — PROGRESS NOTE ADULT - PROBLEM SELECTOR PROBLEM 3
Left Message for patient on home number, stating that we have received her message about needing to be scheduled for the EMG and MRI, I stated to her that she has already been scheduled for the EMG and I have given her the STAT phone number for KAR[facility] to schedule the MRI and told her to call back if she has any more questions. Anticoagulation monitoring, special range

## 2019-02-20 ENCOUNTER — OTHER (OUTPATIENT)
Age: 64
End: 2019-02-20

## 2019-02-21 ENCOUNTER — APPOINTMENT (OUTPATIENT)
Dept: CV DIAGNOSITCS | Facility: HOSPITAL | Age: 64
End: 2019-02-21

## 2019-02-21 ENCOUNTER — APPOINTMENT (OUTPATIENT)
Dept: CARDIOLOGY | Facility: CLINIC | Age: 64
End: 2019-02-21
Payer: MEDICAID

## 2019-02-21 ENCOUNTER — OUTPATIENT (OUTPATIENT)
Dept: OUTPATIENT SERVICES | Facility: HOSPITAL | Age: 64
LOS: 1 days | End: 2019-02-21
Payer: MEDICAID

## 2019-02-21 VITALS
OXYGEN SATURATION: 98 % | BODY MASS INDEX: 24.91 KG/M2 | HEART RATE: 73 BPM | HEIGHT: 70 IN | WEIGHT: 174 LBS | TEMPERATURE: 97.6 F

## 2019-02-21 DIAGNOSIS — Z98.890 OTHER SPECIFIED POSTPROCEDURAL STATES: Chronic | ICD-10-CM

## 2019-02-21 DIAGNOSIS — I10 ESSENTIAL (PRIMARY) HYPERTENSION: ICD-10-CM

## 2019-02-21 DIAGNOSIS — Z95.811 PRESENCE OF HEART ASSIST DEVICE: Chronic | ICD-10-CM

## 2019-02-21 DIAGNOSIS — Z95.811 PRESENCE OF HEART ASSIST DEVICE: ICD-10-CM

## 2019-02-21 PROCEDURE — 93306 TTE W/DOPPLER COMPLETE: CPT | Mod: 26

## 2019-02-21 PROCEDURE — 93306 TTE W/DOPPLER COMPLETE: CPT

## 2019-02-21 PROCEDURE — 93750 INTERROGATION VAD IN PERSON: CPT

## 2019-02-21 PROCEDURE — 99215 OFFICE O/P EST HI 40 MIN: CPT | Mod: 25

## 2019-02-22 ENCOUNTER — MESSAGE (OUTPATIENT)
Age: 64
End: 2019-02-22

## 2019-02-22 ENCOUNTER — RX RENEWAL (OUTPATIENT)
Age: 64
End: 2019-02-22

## 2019-02-22 PROBLEM — I10 BENIGN ESSENTIAL HYPERTENSION: Status: RESOLVED | Noted: 2018-06-28 | Resolved: 2019-02-22

## 2019-02-22 NOTE — END OF VISIT
[FreeTextEntry3] : I have seen and examined the patient in conjunction with the nurse practitioner and I agree with her assessment and plan.I reviewed his echocardiogram, which continues to show severe depression in his LV systolic function. The aortic valve opens intermittently. The septum is midline. He has no significant valvular abnormalities.

## 2019-02-22 NOTE — DISCUSSION/SUMMARY
[Patient] : the patient [___ Month(s)] : [unfilled] month(s) [FreeTextEntry3] : LVAD clinic [FreeTextEntry1] : - No VAD programming changes were made. He is stable as far as it concerns LVAD. \par - Diuretics: Continue Spironolactone\par - Antihypertensives: Continue Lisinopril and Metoprolol\par - Antiarrhythmics: None\par - Coumadin: Monitor INR closely and adjust warfarin accordingly, INR goal is 2.0-2.5, \par - Antiplatelets: Continue aspirin at 81mg every day.\par - PAD: He will continue to follow-up with Dr. Lovelace as needed. \par - Education of LVAD system maintenance was reviewed with patient.\par - Supplies: [facility]

## 2019-02-22 NOTE — HISTORY OF PRESENT ILLNESS
[FreeTextEntry1] : Mr. Quispe is a 63 year old man with ACC/AHA stage D systolic heart failure due to a nonischemic cardiomyopathy who presented with abdominal pain and cardiogenic shock in the fall of 2017. Due to inotropic dependence, he underwent HM2 LVAD implantation with TV annuloplasty ring on 9/12/2017. His post-operative course was largely uncomplicated. He had mild rectal bleeding due to internal hemorrhoids. He had one readmission due to infection of unclear etiology in December 2017. It was not thought to be related to the LVAD. \par \par Currently he feels well. He continues to reports some cramping in his legs after ambulating 10 minutes which resolves with rest; states right  is worse than left. He is now complaining of low back pain that occurs after sitting for 30-40 min and that pain does travel down his leg. Due to severity of his PAD he is   currently not a candidate for transplant. He endorses mild epistaxis, attributes this to air dryness. He denies SOB, dizziness/lightheadedness, dark stool, dark urine. He denies fevers or tenderness at driveline site. \par \par LVAD Interrogation:\par Device Type: HM2\par Speed: 9000\par Flow: 3.9\par Power: 4.7\par PI: 7.0\par MAP: 74\par Events: Occasional PI events w/o speed drops. \par No programming changes made\par Back up battery: 14 months -905\par Driveline dressing changed no erythema no drainage noted

## 2019-02-22 NOTE — PHYSICAL EXAM
[General Appearance - Well Developed] : well developed [No Deformities] : no deformities [Normal Conjunctiva] : the conjunctiva exhibited no abnormalities [Normal Oral Mucosa] : normal oral mucosa [Respiration, Rhythm And Depth] : normal respiratory rhythm and effort [Auscultation Breath Sounds / Voice Sounds] : lungs were clear to auscultation bilaterally [Bowel Sounds] : normal bowel sounds [Abdomen Soft] : soft [Abdomen Tenderness] : non-tender [Abnormal Walk] : normal gait [Nail Clubbing] : no clubbing of the fingernails [Oriented To Time, Place, And Person] : oriented to person, place, and time [] : no respiratory distress [Heart Rate And Rhythm] : heart rate and rhythm were normal [Edema] : no peripheral edema present [FreeTextEntry1] : Typical LVAD sounds.  [Skin Turgor] : normal skin turgor [Impaired Insight] : insight and judgment were intact [No Anxiety] : not feeling anxious

## 2019-02-25 ENCOUNTER — APPOINTMENT (OUTPATIENT)
Dept: UROLOGY | Facility: CLINIC | Age: 64
End: 2019-02-25
Payer: MEDICAID

## 2019-02-25 PROCEDURE — 99214 OFFICE O/P EST MOD 30 MIN: CPT

## 2019-02-25 NOTE — PHYSICAL EXAM
[General Appearance - Well Developed] : well developed [General Appearance - Well Nourished] : well nourished [Normal Appearance] : normal appearance [Well Groomed] : well groomed [General Appearance - In No Acute Distress] : no acute distress [Abdomen Soft] : soft [Abdomen Tenderness] : non-tender [Skin Color & Pigmentation] : normal skin color and pigmentation [Heart Rate And Rhythm] : Heart rate and rhythm were normal [Edema] : no peripheral edema [] : no respiratory distress [Respiration, Rhythm And Depth] : normal respiratory rhythm and effort [Exaggerated Use Of Accessory Muscles For Inspiration] : no accessory muscle use [Oriented To Time, Place, And Person] : oriented to person, place, and time [Affect] : the affect was normal [Mood] : the mood was normal [Not Anxious] : not anxious [Normal Station and Gait] : the gait and station were normal for the patient's age [No Focal Deficits] : no focal deficits [No Palpable Adenopathy] : no palpable adenopathy [Cervical Lymph Nodes Enlarged Posterior Bilaterally] : posterior cervical [Cervical Lymph Nodes Enlarged Anterior Bilaterally] : anterior cervical [Supraclavicular Lymph Nodes Enlarged Bilaterally] : supraclavicular [FreeTextEntry1] : perisinus muscle tenderness \par

## 2019-02-25 NOTE — HISTORY OF PRESENT ILLNESS
[FreeTextEntry1] : Mr. Quispe is a 63 year old man here for a follow up of urinary tract infection. Urinalysis 11/2/17 reported 8 WBC/HPF.1/16/18: The patient returned and denied any gross hematuria or urinary urgency.  On occasion he reported some discomfort  and burning on urination.At night he wakes up 4 times to urinate. Throughout the day he urinates 2-3 times. He reported his urinary stream is slow.\par 3/13/18: The patient returned and reported he is experiencing nocturia. He noted he wakes up 5-6 times during the night to urinate. Complained of abdominal pain.\par 4/11/18: The patient returned and reported he has a weak flow while urinating. Currently taking Trospium and Rapaflo. Noted he did not remember to refill Finasteride. Noted he wakes up 4 times during the night to urinate. \par 5/14/18: The patient returned and reported he has a weak flow while urinating and slight dysuria. \par Currently taking Rapaflo and Finasteride.\par \par 2/25/19: The patient returned today and reported that his urination is adequate. Has to strain to urinate. Complains of dysuria. Wakes up 2x during the night to urinate. Currently takes Finasteride and Oxybutynin ER. Complains of back, abdominal and left knee pain. Has his ventricular assist device with him.

## 2019-02-25 NOTE — ASSESSMENT
[FreeTextEntry1] : Mr. Quispe is a 63 year old man here for a follow up of urinary tract infection. Urinalysis 11/2/17 reported 8 WBC/HPF.1/16/18: The patient returned and denied any gross hematuria or urinary urgency.  On occasion he reported some discomfort  and burning on urination.At night he wakes up 4 times to urinate. Throughout the day he urinates 2-3 times. He reported his urinary stream is slow.\par 3/13/18: The patient returned and reported he is experiencing nocturia. He noted he wakes up 5-6 times during the night to urinate. Complained of abdominal pain.\par 4/11/18: The patient returned and reported he has a weak flow while urinating. Currently taking Trospium and Rapaflo. Noted he did not remember to refill Finasteride. Noted he wakes up 4 times during the night to urinate. \par 5/14/18: The patient returned and reported he has a weak flow while urinating and slight dysuria. \par Currently taking Rapaflo and Finasteride.\par \par 2/25/19: The patient returned today and reported that his urination is adequate. Has to strain to urinate. Complains of dysuria. Wakes up 2x during the night to urinate. Currently takes Finasteride and Oxybutynin ER. Complains of back, abdominal and left knee pain. Has his ventricular assist device with him.\par \par I prescribed the patient Silodosin 8 mg, one capsule daily with food. I advised the patient of light-headedness, nasal congestion, fatigue and decreased sperm amount during ejaculation as side effects.\par \par The patient will return in 2 weeks for a follow up.

## 2019-02-25 NOTE — REVIEW OF SYSTEMS
[Abdominal Pain] : abdominal pain [Dysuria] : dysuria [Nocturia] : nocturia [Feeling Poorly] : not feeling poorly [Chest Pain] : no chest pain [Constipation] : no constipation [Incontinence] : no incontinence

## 2019-02-25 NOTE — ADDENDUM
[FreeTextEntry1] : This note was authored by Lisa Araujo working as a scribe for Dr. Denilson Wyman.\par I, Dr. Denilson Wyman, have reviewed the content of this note and confirm it is true and accurate. I personally performed the history and physical examination and made all the decisions.\par 2/25/19\par

## 2019-02-26 ENCOUNTER — RESULT REVIEW (OUTPATIENT)
Age: 64
End: 2019-02-26

## 2019-03-05 ENCOUNTER — RESULT REVIEW (OUTPATIENT)
Age: 64
End: 2019-03-05

## 2019-03-13 ENCOUNTER — APPOINTMENT (OUTPATIENT)
Dept: UROLOGY | Facility: CLINIC | Age: 64
End: 2019-03-13
Payer: MEDICAID

## 2019-03-13 ENCOUNTER — RESULT REVIEW (OUTPATIENT)
Age: 64
End: 2019-03-13

## 2019-03-13 PROCEDURE — 99214 OFFICE O/P EST MOD 30 MIN: CPT

## 2019-03-13 NOTE — ASSESSMENT
[FreeTextEntry1] : Mr. Quispe is a 63 year old man here for a follow up of urinary tract infection. Urinalysis 11/2/17 reported 8 WBC/HPF.1/16/18: The patient returned and denied any gross hematuria or urinary urgency.  On occasion he reported some discomfort  and burning on urination.At night he wakes up 4 times to urinate. Throughout the day he urinates 2-3 times. He reported his urinary stream is slow.\par 3/13/18: The patient returned and reported he is experiencing nocturia. He noted he wakes up 5-6 times during the night to urinate. Complained of abdominal pain.\par 4/11/18: The patient returned and reported he has a weak flow while urinating. Currently taking Trospium and Rapaflo. Noted he did not remember to refill Finasteride. Noted he wakes up 4 times during the night to urinate. \par 5/14/18: The patient returned and reported he has a weak flow while urinating and slight dysuria. \par Currently taking Rapaflo and Finasteride.\par 2/25/19: The patient returned and reported that his urination is adequate. Had to strain to urinate. Complained of dysuria. Wakes up 2x during the night to urinate. Currently takes Finasteride and Oxybutynin ER. Complained of back, abdominal and left knee pain. Had his ventricular assist device with him.\par \par 3/13/19: The patient returned today and reported his urination has improved since taking Silodosin. Complains of left sacral iliac pain, should consult with his PCP, but complains that he could only get an appointment in five months. Will consult with an orthopedic surgeon.\par \par I renewed the patient Rapaflo 8 mg, one capsule daily with food.\par \par I referred the patient to consult with Dr. Semaj Robins, an orthopedic surgeon, about his back pain.\par \par The patient will return in one month for a follow up.

## 2019-03-13 NOTE — ADDENDUM
[FreeTextEntry1] : This note was authored by Lisa Araujo working as a scribe for Dr. Denilson Wyman.\par I, Dr. Denilson Wyman, have reviewed the content of this note and confirm it is true and accurate. I personally performed the history and physical examination and made all the decisions.\par 3/13/19\par

## 2019-03-13 NOTE — HISTORY OF PRESENT ILLNESS
[FreeTextEntry1] : Mr. Quispe is a 63 year old man here for a follow up of urinary tract infection. Urinalysis 11/2/17 reported 8 WBC/HPF.1/16/18: The patient returned and denied any gross hematuria or urinary urgency.  On occasion he reported some discomfort  and burning on urination.At night he wakes up 4 times to urinate. Throughout the day he urinates 2-3 times. He reported his urinary stream is slow.\par 3/13/18: The patient returned and reported he is experiencing nocturia. He noted he wakes up 5-6 times during the night to urinate. Complained of abdominal pain.\par 4/11/18: The patient returned and reported he has a weak flow while urinating. Currently taking Trospium and Rapaflo. Noted he did not remember to refill Finasteride. Noted he wakes up 4 times during the night to urinate. \par 5/14/18: The patient returned and reported he has a weak flow while urinating and slight dysuria. \par Currently taking Rapaflo and Finasteride.\par 2/25/19: The patient returned and reported that his urination is adequate. Had to strain to urinate. Complained of dysuria. Wakes up 2x during the night to urinate. Currently takes Finasteride and Oxybutynin ER. Complained of back, abdominal and left knee pain. Had his ventricular assist device with him.\par \par 3/13/19: The patient returned today and reported his urination has improved since taking Silodosin. Complains of left sacral iliac pain, should consult with his PCP, but complains that he could only get an appointment in five months. Will consult with an orthopedic surgeon.

## 2019-03-27 ENCOUNTER — RESULT REVIEW (OUTPATIENT)
Age: 64
End: 2019-03-27

## 2019-03-27 ENCOUNTER — RX RENEWAL (OUTPATIENT)
Age: 64
End: 2019-03-27

## 2019-04-01 NOTE — PROGRESS NOTE ADULT - SUBJECTIVE AND OBJECTIVE BOX
Interval Events: Pt denies abdominal pain, denies n/v, tolerating PO, having bms     HPI:62M with no PMHx here with chest pain. Began last week, described as pleuritic, worsens with cough and deep inspiration. Has been having SOB as well, dry cough. Recently vacationed in [facility] came back today. Saw doctor in [facility] but unclear what workup was. No fever, sick contacts, abd pain. CP described as midsternal, nonradiating. Has 25 pack year smoking history.  CTPA does not reveal PE, Pulm congestion noted.       MEDICATIONS  (STANDING):  aspirin enteric coated 81 milliGRAM(s) Oral daily  dextrose 5%. 1000 milliLiter(s) (50 mL/Hr) IV Continuous <Continuous>  dextrose 50% Injectable 12.5 Gram(s) IV Push once  dextrose 50% Injectable 25 Gram(s) IV Push once  dextrose 50% Injectable 25 Gram(s) IV Push once  docusate sodium 100 milliGRAM(s) Oral three times a day  glycerin Suppository - Adult 1 Suppository(s) Rectal every 4 hours  insulin lispro (HumaLOG) corrective regimen sliding scale   SubCutaneous three times a day before meals  lidocaine   Patch 1 Patch Transdermal daily  lidocaine   Patch 1 Patch Transdermal every 24 hours  lisinopril 5 milliGRAM(s) Oral at bedtime  metoprolol succinate ER 50 milliGRAM(s) Oral daily  mirtazapine 7.5 milliGRAM(s) Oral at bedtime  pantoprazole    Tablet 40 milliGRAM(s) Oral before breakfast  polyethylene glycol 3350 17 Gram(s) Oral at bedtime  senna 3 Tablet(s) Oral at bedtime  spironolactone 12.5 milliGRAM(s) Oral daily    MEDICATIONS  (PRN):  dextrose Gel 1 Dose(s) Oral once PRN Blood Glucose LESS THAN 70 milliGRAM(s)/deciLiter  glucagon  Injectable 1 milliGRAM(s) IntraMuscular once PRN Glucose <70 milliGRAM(s)/deciLiter  naloxone Injectable 0.1 milliGRAM(s) IV Push every 3 minutes PRN Sedation and respiratory depression RR < 11  oxyCODONE    IR 5 milliGRAM(s) Oral every 6 hours PRN Severe Pain (7 - 10)  phenylephrine 0.25% Suppository 1 Suppository(s) Rectal every 8 hours PRN Hemorrhoids      Allergies    No Known Allergies    Intolerances        Review of Systems:    General:  No wt loss, fevers, chills, night sweats,fatigue,   Eyes:  Good vision, no reported pain  ENT:  No sore throat, pain, runny nose, dysphagia  CV:  No pain, palpitations, hypo/hypertension  Resp:  No dyspnea, cough, tachypnea, wheezing  GI:  No pain, No nausea, No vomiting, No diarrhea, No constipation, No weight loss, No fever, No pruritis, No rectal bleeding, No melena, No dysphagia  :  No pain, bleeding, incontinence, nocturia  Muscle:  No pain, weakness  Neuro:  No weakness, tingling, memory problems  Psych:  No fatigue, insomnia, mood problems, depression  Endocrine:  No polyuria, polydypsia, cold/heat intolerance  Heme:  No petechiae, ecchymosis, easy bruisability  Skin:  No rash, tattoos, scars, edema      Vital Signs Last 24 Hrs  T(C): 36.8 (20 Oct 2017 09:53), Max: 37.2 (19 Oct 2017 20:13)  T(F): 98.3 (20 Oct 2017 09:53), Max: 98.9 (19 Oct 2017 20:13)  HR: 96 (20 Oct 2017 09:53) (96 - 106)  BP: --  BP(mean): 78 (20 Oct 2017 09:53) (68 - 80)  RR: 20 (20 Oct 2017 09:53) (20 - 20)  SpO2: 95% (20 Oct 2017 09:53) (95% - 98%)    PHYSICAL EXAM:    Constitutional: NAD, well-developed  HEENT: EOMI, throat clear  Neck: No LAD, supple  Respiratory: CTA and P  Cardiovascular: S1 and S2, RRR, no M  Gastrointestinal: BS+, soft, NT/ND, neg HSM,  Extremities: No peripheral edema, neg clubing, cyanosis  Vascular: 2+ peripheral pulses  Neurological: A/O x 3, no focal deficits  Psychiatric: Normal mood, normal affect  Skin: No rashes          LABS:                        9.5    10.9  )-----------( 297      ( 20 Oct 2017 06:00 )             29.0     10-20    136  |  96  |  16  ----------------------------<  89  4.4   |  30  |  0.71    Ca    9.8      20 Oct 2017 06:00      PT/INR - ( 20 Oct 2017 06:35 )   PT: 18.3 sec;   INR: 1.68 ratio         PTT - ( 20 Oct 2017 06:35 )  PTT:33.6 sec      RADIOLOGY & ADDITIONAL TESTS: Patient

## 2019-04-02 ENCOUNTER — RESULT REVIEW (OUTPATIENT)
Age: 64
End: 2019-04-02

## 2019-04-02 ENCOUNTER — RX RENEWAL (OUTPATIENT)
Age: 64
End: 2019-04-02

## 2019-04-04 ENCOUNTER — APPOINTMENT (OUTPATIENT)
Dept: CARDIOLOGY | Facility: CLINIC | Age: 64
End: 2019-04-04
Payer: MEDICAID

## 2019-04-04 VITALS
HEART RATE: 70 BPM | BODY MASS INDEX: 25.2 KG/M2 | TEMPERATURE: 97.52 F | OXYGEN SATURATION: 97 % | HEIGHT: 70 IN | WEIGHT: 176 LBS

## 2019-04-04 PROCEDURE — 99214 OFFICE O/P EST MOD 30 MIN: CPT

## 2019-04-04 PROCEDURE — 93750 INTERROGATION VAD IN PERSON: CPT

## 2019-04-04 NOTE — HISTORY OF PRESENT ILLNESS
[FreeTextEntry1] : Mr. Quispe is a 63 year old man with ACC/AHA stage D systolic heart failure due to a nonischemic cardiomyopathy who presented with abdominal pain and cardiogenic shock in the fall of 2017. Due to inotropic dependence, he underwent HM2 LVAD implantation with TV annuloplasty ring on 9/12/2017. His post-operative course was largely uncomplicated. He had mild rectal bleeding due to internal hemorrhoids. He had one readmission due to infection of unclear etiology in December 2017. It was not thought to be related to the LVAD. \par \par Currently he feels tired and did not want to do his 6 MWTl. He continues to reports some cramping in his legs after ambulating 10 minutes which resolves with rest; states right  is worse than left. He is now complaining of low back pain that occurs after sitting for 30-40 min and that pain does travel down his leg. Due to severity of his PAD he is currently not a candidate for transplant. He endorses mild epistaxis, attributes this to air dryness. He denies SOB, dizziness/lightheadedness, dark stool, dark urine. He denies fevers or tenderness at driveline site. \par \par LVAD Interrogation:\par Device Type: HM2\par Speed: 9000\par Flow: 4.2\par Power: 4.8\par PI: 5.5\par MAP: 62\par Events:Rarel PI events w/o speed drops. \par No programming changes made\par Back up battery: 12 months -539\par Driveline dressing changed no erythema no drainage noted

## 2019-04-04 NOTE — DISCUSSION/SUMMARY
[Patient] : the patient [___ Month(s)] : [unfilled] month(s) [FreeTextEntry3] : LVAD clinic [FreeTextEntry1] : - No VAD programming changes were made. He is stable as far as it concerns LVAD. \par - Diuretics: Continue Spironolactone\par - Antihypertensives: Continue Lisinopril and Metoprolol\par - Antiarrhythmics: None\par - Coumadin: Monitor INR closely and adjust warfarin accordingly, INR goal is 2.0-2.5, \par - Antiplatelets: Continue aspirin at 81mg every day.\par - PAD: I will ask Dr. Tariq to see patient to see if there is an intervention to be done as walking program not effective. \par - Education of LVAD system maintenance was reviewed with patient.\par - Supplies: [place]

## 2019-04-04 NOTE — PHYSICAL EXAM
[General Appearance - Well Developed] : well developed [General Appearance - Well Nourished] : well nourished [Normal Conjunctiva] : the conjunctiva exhibited no abnormalities [Normal Oral Mucosa] : normal oral mucosa [Respiration, Rhythm And Depth] : normal respiratory rhythm and effort [Auscultation Breath Sounds / Voice Sounds] : lungs were clear to auscultation bilaterally [Heart Sounds] : normal S1 and S2 [Bowel Sounds] : normal bowel sounds [Abdomen Soft] : soft [Abnormal Walk] : normal gait [Skin Color & Pigmentation] : normal skin color and pigmentation [] : no rash [Oriented To Time, Place, And Person] : oriented to person, place, and time [FreeTextEntry1] : +hum of LVAD [Nail Clubbing] : no clubbing of the fingernails [Cyanosis, Localized] : no localized cyanosis

## 2019-04-09 ENCOUNTER — RESULT REVIEW (OUTPATIENT)
Age: 64
End: 2019-04-09

## 2019-04-15 ENCOUNTER — APPOINTMENT (OUTPATIENT)
Dept: UROLOGY | Facility: CLINIC | Age: 64
End: 2019-04-15
Payer: MEDICAID

## 2019-04-15 PROCEDURE — 99214 OFFICE O/P EST MOD 30 MIN: CPT

## 2019-04-15 NOTE — ADDENDUM
[FreeTextEntry1] : This note was authored by Lisa Araujo working as a scribe for Dr. Denilson Wyman.\par I, Dr. Denilson Wyman, have reviewed the content of this note and confirm it is true and accurate. I personally performed the history and physical examination and made all the decisions.\par 4/15/19\par

## 2019-04-15 NOTE — REVIEW OF SYSTEMS
[Abdominal Pain] : abdominal pain [Dysuria] : dysuria [Nocturia] : nocturia [Chest Pain] : no chest pain [Feeling Poorly] : not feeling poorly [Constipation] : no constipation [Incontinence] : no incontinence

## 2019-04-15 NOTE — PHYSICAL EXAM
[General Appearance - Well Developed] : well developed [General Appearance - Well Nourished] : well nourished [Well Groomed] : well groomed [Normal Appearance] : normal appearance [General Appearance - In No Acute Distress] : no acute distress [Abdomen Soft] : soft [Abdomen Tenderness] : non-tender [Skin Color & Pigmentation] : normal skin color and pigmentation [Heart Rate And Rhythm] : Heart rate and rhythm were normal [Edema] : no peripheral edema [] : no respiratory distress [Respiration, Rhythm And Depth] : normal respiratory rhythm and effort [Exaggerated Use Of Accessory Muscles For Inspiration] : no accessory muscle use [Oriented To Time, Place, And Person] : oriented to person, place, and time [Affect] : the affect was normal [Mood] : the mood was normal [Not Anxious] : not anxious [Normal Station and Gait] : the gait and station were normal for the patient's age [No Focal Deficits] : no focal deficits [No Palpable Adenopathy] : no palpable adenopathy [Cervical Lymph Nodes Enlarged Posterior Bilaterally] : posterior cervical [Cervical Lymph Nodes Enlarged Anterior Bilaterally] : anterior cervical [Supraclavicular Lymph Nodes Enlarged Bilaterally] : supraclavicular [FreeTextEntry1] : Smile is symmetric. Tongue is midline. hand  3/5 bilaterally\par

## 2019-04-15 NOTE — HISTORY OF PRESENT ILLNESS
[FreeTextEntry1] : Mr. Quispe is a 63 year old man here for a follow up of urinary tract infection. Urinalysis 11/2/17 reported 8 WBC/HPF.1/16/18: The patient returned and denied any gross hematuria or urinary urgency.  On occasion he reported some discomfort  and burning on urination.At night he wakes up 4 times to urinate. Throughout the day he urinates 2-3 times. He reported his urinary stream is slow.\par 3/13/18: The patient returned and reported he is experiencing nocturia. He noted he wakes up 5-6 times during the night to urinate. Complained of abdominal pain.\par 4/11/18: The patient returned and reported he has a weak flow while urinating. Currently taking Trospium and Rapaflo. Noted he did not remember to refill Finasteride. Noted he wakes up 4 times during the night to urinate. \par 5/14/18: The patient returned and reported he has a weak flow while urinating and slight dysuria. \par Currently taking Rapaflo and Finasteride.\par 2/25/19: The patient returned and reported that his urination is adequate. Had to strain to urinate. Complained of dysuria. Wakes up 2x during the night to urinate. Currently takes Finasteride and Oxybutynin ER. Complained of back, abdominal and left knee pain. Had his ventricular assist device with him.\par 3/13/19: The patient returned and reported his urination has improved since taking Silodosin. Complained of left sacral iliac pain, should consult with his PCP, but complained that he could only get an appointment in five months. Will consult with an orthopedic surgeon.\par \par 4/15/19: The patient returned today and reported that he has dysuria. Denies gross hematuria. Wakes up 1-2x during the night to urinate. Taking Alfuzosin. No longer taking Oxybutynin. Didn't take Silodosin, as his insurance wouldn't pay for it.

## 2019-04-15 NOTE — ASSESSMENT
[FreeTextEntry1] : Mr. Quispe is a 63 year old man here for a follow up of urinary tract infection. Urinalysis 11/2/17 reported 8 WBC/HPF.1/16/18: The patient returned and denied any gross hematuria or urinary urgency.  On occasion he reported some discomfort  and burning on urination.At night he wakes up 4 times to urinate. Throughout the day he urinates 2-3 times. He reported his urinary stream is slow.\par 3/13/18: The patient returned and reported he is experiencing nocturia. He noted he wakes up 5-6 times during the night to urinate. Complained of abdominal pain.\par 4/11/18: The patient returned and reported he has a weak flow while urinating. Currently taking Trospium and Rapaflo. Noted he did not remember to refill Finasteride. Noted he wakes up 4 times during the night to urinate. \par 5/14/18: The patient returned and reported he has a weak flow while urinating and slight dysuria. \par Currently taking Rapaflo and Finasteride.\par 2/25/19: The patient returned and reported that his urination is adequate. Had to strain to urinate. Complained of dysuria. Wakes up 2x during the night to urinate. Currently takes Finasteride and Oxybutynin ER. Complained of back, abdominal and left knee pain. Had his ventricular assist device with him.\par 3/13/19: The patient returned and reported his urination has improved since taking Silodosin. Complained of left sacral iliac pain, should consult with his PCP, but complained that he could only get an appointment in five months. Will consult with an orthopedic surgeon.\par \par 4/15/19: The patient returned today and reported that he has dysuria. Denies gross hematuria. Wakes up 1-2x during the night to urinate. Taking Alfuzosin. No longer taking Oxybutynin. Didn't take Silodosin, as his insurance wouldn't pay for it.\par \par I renewed the patient Alfuzosin ER 10 mg, once daily with food.\par I prescribed the patient Trospium 20 mg, one tablet once every morning. I informed the patient that there may be constipation as a side effect. \par \par The patient will return in one month for a follow up.

## 2019-05-01 NOTE — PROGRESS NOTE BEHAVIORAL HEALTH - NSBHCHARTREVIEWINVESTIGATE_PSY_A_CORE FT
Anxiety    HTN (hypertension)
10/4/17 QTc 424 ms
9/13/17 QTc 485 ms
< from: 12 Lead ECG (10.04.17 @ 07:48) >      Ventricular Rate 95 BPM    Atrial Rate 95 BPM    P-R Interval 118 ms    QRS Duration 100 ms     ms    QTc 424 ms    P Axis 66 degrees    R Axis -77 degrees    T Axis 80 degrees    Diagnosis Line SINUS RHYTHM WITH FREQUENT PREMATURE VENTRICULAR COMPLEXES AND POSSIBLE PREMATURE ATRIAL COMPLEXES WITH ABERRANT  CONDUCTION  POSSIBLE LEFT ATRIAL ENLARGEMENT  LEFT ANTERIOR FASCICULAR BLOCK  ANTEROLATERAL INFARCT , AGE UNDETERMINED    < end of copied text >

## 2019-05-09 ENCOUNTER — OUTPATIENT (OUTPATIENT)
Dept: OUTPATIENT SERVICES | Facility: HOSPITAL | Age: 64
LOS: 1 days | End: 2019-05-09
Payer: MEDICAID

## 2019-05-09 ENCOUNTER — APPOINTMENT (OUTPATIENT)
Dept: CV DIAGNOSITCS | Facility: HOSPITAL | Age: 64
End: 2019-05-09

## 2019-05-09 ENCOUNTER — RESULT REVIEW (OUTPATIENT)
Age: 64
End: 2019-05-09

## 2019-05-09 ENCOUNTER — APPOINTMENT (OUTPATIENT)
Dept: CARDIOLOGY | Facility: CLINIC | Age: 64
End: 2019-05-09
Payer: MEDICAID

## 2019-05-09 ENCOUNTER — NON-APPOINTMENT (OUTPATIENT)
Age: 64
End: 2019-05-09

## 2019-05-09 VITALS
HEART RATE: 74 BPM | BODY MASS INDEX: 25.2 KG/M2 | OXYGEN SATURATION: 98 % | WEIGHT: 176 LBS | HEIGHT: 70 IN | TEMPERATURE: 93.2 F

## 2019-05-09 DIAGNOSIS — Z95.811 PRESENCE OF HEART ASSIST DEVICE: Chronic | ICD-10-CM

## 2019-05-09 DIAGNOSIS — Z98.890 OTHER SPECIFIED POSTPROCEDURAL STATES: Chronic | ICD-10-CM

## 2019-05-09 DIAGNOSIS — Z95.811 PRESENCE OF HEART ASSIST DEVICE: ICD-10-CM

## 2019-05-09 PROCEDURE — 93306 TTE W/DOPPLER COMPLETE: CPT | Mod: 26

## 2019-05-09 PROCEDURE — 93750 INTERROGATION VAD IN PERSON: CPT

## 2019-05-09 PROCEDURE — 99214 OFFICE O/P EST MOD 30 MIN: CPT

## 2019-05-09 PROCEDURE — 93000 ELECTROCARDIOGRAM COMPLETE: CPT

## 2019-05-09 PROCEDURE — 93306 TTE W/DOPPLER COMPLETE: CPT

## 2019-05-09 NOTE — PHYSICAL EXAM
[General Appearance - Well Nourished] : well nourished [General Appearance - Well Developed] : well developed [Normal Oral Mucosa] : normal oral mucosa [Respiration, Rhythm And Depth] : normal respiratory rhythm and effort [Auscultation Breath Sounds / Voice Sounds] : lungs were clear to auscultation bilaterally [Bowel Sounds] : normal bowel sounds [Abdomen Soft] : soft [Abdomen Tenderness] : non-tender [Cyanosis, Localized] : no localized cyanosis [Abnormal Walk] : normal gait [Nail Clubbing] : no clubbing of the fingernails [Skin Color & Pigmentation] : normal skin color and pigmentation [Oriented To Time, Place, And Person] : oriented to person, place, and time [] : no rash [No Anxiety] : not feeling anxious [FreeTextEntry1] : +hum of LVAD

## 2019-05-09 NOTE — REASON FOR VISIT
[Follow-Up - Clinic] : a clinic follow-up of [Heart Failure] : congestive heart failure [Cardiomyopathy] : cardiomyopathy [FreeTextEntry1] : LVAD

## 2019-05-09 NOTE — HISTORY OF PRESENT ILLNESS
[FreeTextEntry1] : Mr. Quispe is a 63 year old man with ACC/AHA stage D systolic heart failure due to a nonischemic cardiomyopathy who presented with abdominal pain and cardiogenic shock in the fall of 2017. Due to inotropic dependence, he underwent HM2 LVAD implantation with TV annuloplasty ring on 9/12/2017. His post-operative course was largely uncomplicated. He had mild rectal bleeding due to internal hemorrhoids. He had one readmission due to infection of unclear etiology in December 2017. It was not thought to be related to the LVAD. \par \par Currently he feels congested. He continues to reports some cramping in his legs after ambulating 10 minutes which resolves with rest; states right  is worse than left. Due to severity of his PAD he is currently not a candidate for transplant. He endorses mild epistaxis, attributes this to air dryness. He denies SOB, dizziness/lightheadedness, dark stool, dark urine. He denies fevers or tenderness at driveline site. \par \par LVAD Interrogation:\par Device Type: HM2\par Speed: 9000\par Flow: 4.0\par Power: 4.7\par PI: 5.1\par MAP: 74\par Events:Rarel PI events w/o speed drops. \par No programming changes made\par Back up battery: 11 months -422\par Driveline dressing changed no erythema no drainage noted

## 2019-05-14 ENCOUNTER — RESULT REVIEW (OUTPATIENT)
Age: 64
End: 2019-05-14

## 2019-05-15 ENCOUNTER — APPOINTMENT (OUTPATIENT)
Dept: UROLOGY | Facility: CLINIC | Age: 64
End: 2019-05-15
Payer: MEDICAID

## 2019-05-15 ENCOUNTER — RX RENEWAL (OUTPATIENT)
Age: 64
End: 2019-05-15

## 2019-05-15 PROCEDURE — 99214 OFFICE O/P EST MOD 30 MIN: CPT

## 2019-05-15 NOTE — HISTORY OF PRESENT ILLNESS
[FreeTextEntry1] : Mr. Quispe is a 63 year old man here for a follow up of urinary tract infection. Urinalysis 11/2/17 reported 8 WBC/HPF.1/16/18: The patient returned and denied any gross hematuria or urinary urgency.  On occasion he reported some discomfort  and burning on urination.At night he wakes up 4 times to urinate. Throughout the day he urinates 2-3 times. He reported his urinary stream is slow.\par 3/13/18: The patient returned and reported he is experiencing nocturia. He noted he wakes up 5-6 times during the night to urinate. Complained of abdominal pain.\par 4/11/18: The patient returned and reported he has a weak flow while urinating. Currently taking Trospium and Rapaflo. Noted he did not remember to refill Finasteride. Noted he wakes up 4 times during the night to urinate. \par 5/14/18: The patient returned and reported he has a weak flow while urinating and slight dysuria. \par Currently taking Rapaflo and Finasteride.\par 2/25/19: The patient returned and reported that his urination is adequate. Had to strain to urinate. Complained of dysuria. Wakes up 2x during the night to urinate. Currently takes Finasteride and Oxybutynin ER. Complained of back, abdominal and left knee pain. Had his ventricular assist device with him.\par 3/13/19: The patient returned and reported his urination has improved since taking Silodosin. Complained of left sacral iliac pain, should consult with his PCP, but complained that he could only get an appointment in five months. Will consult with an orthopedic surgeon.\par 4/15/19: The patient returned today and reported that he has dysuria. Denies gross hematuria. Wakes up 1-2x during the night to urinate. Taking Alfuzosin. No longer taking Oxybutynin. Didn't take Silodosin, as his insurance wouldn't pay for it.\par \par 5/15/19: Patient returns today for a one month follow up. He no longer c/o dysuria and denies any gross hematuria.. Alfuzosin and Trospium are taken as directed with relief. He still wakes up occasionally at night to urinate but not very frequently. Additionally he notes that some medications cause him to have stomach issues. Dizziness is also reported on occasion. No new medical problems.

## 2019-05-15 NOTE — ADDENDUM
[FreeTextEntry1] : This note was authored by Christiano Mayes working as a medical scribe for Dr. Denilson Wyman. The note was reviewed, edited, and revised by Dr. Denilson Wyman who is in agreement with the exam findings, and treatment plan. (5/15/19)

## 2019-05-15 NOTE — ASSESSMENT
[FreeTextEntry1] : Mr. Quispe is a 63 year old man here for a follow up of urinary tract infection. Urinalysis 11/2/17 reported 8 WBC/HPF.1/16/18: The patient returned and denied any gross hematuria or urinary urgency.  On occasion he reported some discomfort  and burning on urination.At night he wakes up 4 times to urinate. Throughout the day he urinates 2-3 times. He reported his urinary stream is slow.\par 3/13/18: The patient returned and reported he is experiencing nocturia. He noted he wakes up 5-6 times during the night to urinate. Complained of abdominal pain.\par 4/11/18: The patient returned and reported he has a weak flow while urinating. Currently taking Trospium and Rapaflo. Noted he did not remember to refill Finasteride. Noted he wakes up 4 times during the night to urinate. \par 5/14/18: The patient returned and reported he has a weak flow while urinating and slight dysuria. \par Currently taking Rapaflo and Finasteride.\par 2/25/19: The patient returned and reported that his urination is adequate. Had to strain to urinate. Complained of dysuria. Wakes up 2x during the night to urinate. Currently takes Finasteride and Oxybutynin ER. Complained of back, abdominal and left knee pain. Had his ventricular assist device with him.\par 3/13/19: The patient returned and reported his urination has improved since taking Silodosin. Complained of left sacral iliac pain, should consult with his PCP, but complained that he could only get an appointment in five months. Will consult with an orthopedic surgeon.\par 4/15/19: The patient returned today and reported that he has dysuria. Denies gross hematuria. Wakes up 1-2x during the night to urinate. Taking Alfuzosin. No longer taking Oxybutynin. Didn't take Silodosin, as his insurance wouldn't pay for it.\par \par 5/15/19: Patient returns today for a one month follow up. He no longer c/o dysuria and denies any gross hematuria.. Alfuzosin and Trospium are taken as directed with relief. He still wakes up occasionally at night to urinate but not very frequently. Additionally he notes that some medications cause him to have stomach issues. Dizziness is also reported on occasion. No new medical problems. \par \par I renewed the patient Alfuzosin ER 10 mg, once daily with food.\par \par I renewed the patient Trospium 20 mg, one tablet once every morning. \par \par The patient produced a urine sample which will be sent for urinalysis, urine cytology and urine culture. \par Blood work today included comprehensive metabolic panel, CBC, PSA and testosterone. \par \par It is advised that he regularly follow up with his PCP and to discuss his stomach issues. \par \par The patient will return in one month for a follow up.

## 2019-05-15 NOTE — PHYSICAL EXAM
[General Appearance - Well Nourished] : well nourished [General Appearance - Well Developed] : well developed [Well Groomed] : well groomed [Normal Appearance] : normal appearance [General Appearance - In No Acute Distress] : no acute distress [Abdomen Tenderness] : non-tender [Abdomen Soft] : soft [Skin Color & Pigmentation] : normal skin color and pigmentation [Edema] : no peripheral edema [Heart Rate And Rhythm] : Heart rate and rhythm were normal [] : no respiratory distress [Respiration, Rhythm And Depth] : normal respiratory rhythm and effort [Exaggerated Use Of Accessory Muscles For Inspiration] : no accessory muscle use [Oriented To Time, Place, And Person] : oriented to person, place, and time [Affect] : the affect was normal [Mood] : the mood was normal [Not Anxious] : not anxious [Normal Station and Gait] : the gait and station were normal for the patient's age [No Focal Deficits] : no focal deficits [No Palpable Adenopathy] : no palpable adenopathy [Cervical Lymph Nodes Enlarged Posterior Bilaterally] : posterior cervical [Supraclavicular Lymph Nodes Enlarged Bilaterally] : supraclavicular [Cervical Lymph Nodes Enlarged Anterior Bilaterally] : anterior cervical [FreeTextEntry1] : perisinus muscle tenderness \par

## 2019-05-18 LAB
APPEARANCE: CLEAR
BACTERIA UR CULT: NORMAL
BACTERIA: NEGATIVE
BILIRUBIN URINE: NEGATIVE
BLOOD URINE: NEGATIVE
COLOR: YELLOW
GLUCOSE QUALITATIVE U: NEGATIVE
HYALINE CASTS: 0 /LPF
KETONES URINE: ABNORMAL
LEUKOCYTE ESTERASE URINE: NEGATIVE
MICROSCOPIC-UA: NORMAL
NITRITE URINE: NEGATIVE
PH URINE: 6.5
PROTEIN URINE: ABNORMAL
PSA SERPL-MCNC: 1.2 NG/ML
RED BLOOD CELLS URINE: 3 /HPF
SPECIFIC GRAVITY URINE: 1.02
SQUAMOUS EPITHELIAL CELLS: 0 /HPF
URINE COMMENTS: NORMAL
UROBILINOGEN URINE: NORMAL
WHITE BLOOD CELLS URINE: 2 /HPF

## 2019-05-19 LAB
ALBUMIN SERPL ELPH-MCNC: 4.3 G/DL
ALP BLD-CCNC: 95 U/L
ALT SERPL-CCNC: 12 U/L
ANION GAP SERPL CALC-SCNC: 7 MMOL/L
AST SERPL-CCNC: 22 U/L
BASOPHILS # BLD AUTO: 0.05 K/UL
BASOPHILS NFR BLD AUTO: 1.4 %
BILIRUB SERPL-MCNC: <0.2 MG/DL
BUN SERPL-MCNC: 26 MG/DL
CALCIUM SERPL-MCNC: 9.1 MG/DL
CHLORIDE SERPL-SCNC: 108 MMOL/L
CO2 SERPL-SCNC: 24 MMOL/L
CREAT SERPL-MCNC: 1.69 MG/DL
EOSINOPHIL # BLD AUTO: 0.3 K/UL
EOSINOPHIL NFR BLD AUTO: 8.1 %
GLUCOSE SERPL-MCNC: 84 MG/DL
HCT VFR BLD CALC: 33.7 %
HGB BLD-MCNC: 10.8 G/DL
IMM GRANULOCYTES NFR BLD AUTO: 0 %
LYMPHOCYTES # BLD AUTO: 1.32 K/UL
LYMPHOCYTES NFR BLD AUTO: 35.7 %
MAN DIFF?: NORMAL
MCHC RBC-ENTMCNC: 30.3 PG
MCHC RBC-ENTMCNC: 32 GM/DL
MCV RBC AUTO: 94.7 FL
MONOCYTES # BLD AUTO: 0.44 K/UL
MONOCYTES NFR BLD AUTO: 11.9 %
NEUTROPHILS # BLD AUTO: 1.59 K/UL
NEUTROPHILS NFR BLD AUTO: 42.9 %
PLATELET # BLD AUTO: 150 K/UL
POTASSIUM SERPL-SCNC: 5.9 MMOL/L
PROT SERPL-MCNC: 7.5 G/DL
RBC # BLD: 3.56 M/UL
RBC # FLD: 13.4 %
SODIUM SERPL-SCNC: 139 MMOL/L
TESTOST BND SERPL-MCNC: 5.8 PG/ML
TESTOST SERPL-MCNC: 385.6 NG/DL
URINE CYTOLOGY: NORMAL
WBC # FLD AUTO: 3.7 K/UL

## 2019-05-29 ENCOUNTER — RESULT REVIEW (OUTPATIENT)
Age: 64
End: 2019-05-29

## 2019-06-03 ENCOUNTER — RX RENEWAL (OUTPATIENT)
Age: 64
End: 2019-06-03

## 2019-06-04 ENCOUNTER — RX CHANGE (OUTPATIENT)
Age: 64
End: 2019-06-04

## 2019-06-04 RX ORDER — WARFARIN 6 MG/1
6 TABLET ORAL DAILY
Qty: 90 | Refills: 3 | Status: DISCONTINUED | COMMUNITY
Start: 2019-06-04 | End: 2019-06-04

## 2019-06-12 ENCOUNTER — RESULT REVIEW (OUTPATIENT)
Age: 64
End: 2019-06-12

## 2019-06-13 ENCOUNTER — APPOINTMENT (OUTPATIENT)
Dept: CARDIOLOGY | Facility: CLINIC | Age: 64
End: 2019-06-13
Payer: MEDICAID

## 2019-06-13 ENCOUNTER — NON-APPOINTMENT (OUTPATIENT)
Age: 64
End: 2019-06-13

## 2019-06-13 VITALS
OXYGEN SATURATION: 99 % | TEMPERATURE: 97.7 F | HEIGHT: 70 IN | HEART RATE: 69 BPM | WEIGHT: 177 LBS | BODY MASS INDEX: 25.34 KG/M2

## 2019-06-13 LAB
ALBUMIN SERPL ELPH-MCNC: 4.3 G/DL
ALP BLD-CCNC: 101 U/L
ALT SERPL-CCNC: 9 U/L
ANION GAP SERPL CALC-SCNC: 10 MMOL/L
AST SERPL-CCNC: 19 U/L
BASOPHILS # BLD AUTO: 0.05 K/UL
BASOPHILS NFR BLD AUTO: 1.1 %
BILIRUB SERPL-MCNC: 0.2 MG/DL
BUN SERPL-MCNC: 25 MG/DL
CALCIUM SERPL-MCNC: 9.1 MG/DL
CHLORIDE SERPL-SCNC: 107 MMOL/L
CO2 SERPL-SCNC: 21 MMOL/L
CREAT SERPL-MCNC: 1.61 MG/DL
EOSINOPHIL # BLD AUTO: 0.32 K/UL
EOSINOPHIL NFR BLD AUTO: 7.1 %
GLUCOSE SERPL-MCNC: 86 MG/DL
HCT VFR BLD CALC: 34.3 %
HGB BLD-MCNC: 10.8 G/DL
IMM GRANULOCYTES NFR BLD AUTO: 0.2 %
LYMPHOCYTES # BLD AUTO: 1.45 K/UL
LYMPHOCYTES NFR BLD AUTO: 32.2 %
MAN DIFF?: NORMAL
MCHC RBC-ENTMCNC: 29.7 PG
MCHC RBC-ENTMCNC: 31.5 GM/DL
MCV RBC AUTO: 94.2 FL
MONOCYTES # BLD AUTO: 0.43 K/UL
MONOCYTES NFR BLD AUTO: 9.6 %
NEUTROPHILS # BLD AUTO: 2.24 K/UL
NEUTROPHILS NFR BLD AUTO: 49.8 %
NT-PROBNP SERPL-MCNC: 277 PG/ML
PLATELET # BLD AUTO: 172 K/UL
POTASSIUM SERPL-SCNC: 5.5 MMOL/L
PROT SERPL-MCNC: 7.4 G/DL
RBC # BLD: 3.64 M/UL
RBC # FLD: 14.6 %
SODIUM SERPL-SCNC: 138 MMOL/L
WBC # FLD AUTO: 4.5 K/UL

## 2019-06-13 PROCEDURE — 93750 INTERROGATION VAD IN PERSON: CPT

## 2019-06-13 PROCEDURE — 99215 OFFICE O/P EST HI 40 MIN: CPT | Mod: 25

## 2019-06-13 PROCEDURE — 93000 ELECTROCARDIOGRAM COMPLETE: CPT

## 2019-06-16 NOTE — DISCUSSION/SUMMARY
[Patient] : the patient [___ Month(s)] : [unfilled] month(s) [FreeTextEntry3] : LVAD clinic [FreeTextEntry1] : - No VAD programming changes were made. He is stable as far as it concerns LVAD. \par - Diuretics: discontinue Spironolactone due to Potassium 5.5\par - Antihypertensives: Continue Lisinopril and Metoprolol\par - Antiarrhythmics: None\par - Coumadin: Monitor INR closely and adjust warfarin accordingly, INR goal is 2.0-2.5, \par - Antiplatelets: Continue aspirin at 81mg every day.\par - PAD: I will ask Dr. Tariq to see patient to see if there is an intervention to be done as walking program not effective. \par - Education of LVAD system maintenance was reviewed with patient.\par - Supplies: Gary

## 2019-06-16 NOTE — PHYSICAL EXAM
[General Appearance - Well Developed] : well developed [General Appearance - Well Nourished] : well nourished [Normal Oral Mucosa] : normal oral mucosa [Respiration, Rhythm And Depth] : normal respiratory rhythm and effort [Auscultation Breath Sounds / Voice Sounds] : lungs were clear to auscultation bilaterally [Bowel Sounds] : normal bowel sounds [Abdomen Tenderness] : non-tender [Abdomen Soft] : soft [Abnormal Walk] : normal gait [Nail Clubbing] : no clubbing of the fingernails [Cyanosis, Localized] : no localized cyanosis [] : no rash [Skin Color & Pigmentation] : normal skin color and pigmentation [Oriented To Time, Place, And Person] : oriented to person, place, and time [No Anxiety] : not feeling anxious [FreeTextEntry1] : +hum of LVAD

## 2019-06-16 NOTE — HISTORY OF PRESENT ILLNESS
[FreeTextEntry1] : Mr. Quispe is a 63 year old man with ACC/AHA stage D systolic heart failure due to a nonischemic cardiomyopathy who presented with abdominal pain and cardiogenic shock in the fall of 2017. Due to inotropic dependence, he underwent HM2 LVAD implantation with TV annuloplasty ring on 9/12/2017. His post-operative course was largely uncomplicated. He had mild rectal bleeding due to internal hemorrhoids. He had one readmission due to infection of unclear etiology in December 2017. It was not thought to be related to the LVAD. \par \par He continues to reports some cramping in his legs after ambulating 10 minutes which resolves with rest; states right is worse than left. Due to severity of his PAD he is currently not a candidate for transplant. He reports lightheadedness (may be related to his BPH meds). He denies SOB, dark stool, dark urine. He denies fevers or tenderness at driveline site. \par \par LVAD Interrogation:\par Device Type: HM2\par Speed: 9000\par Flow: 5.7\par Power: 5.6\par PI: 5.9\par MAP: 78\par Events:Rarel PI events w/o speed drops. \par No programming changes made\par Back up battery: 10 months -959\par Driveline dressing changed no erythema no drainage noted

## 2019-06-19 ENCOUNTER — RESULT REVIEW (OUTPATIENT)
Age: 64
End: 2019-06-19

## 2019-07-02 ENCOUNTER — APPOINTMENT (OUTPATIENT)
Dept: CARDIOLOGY | Facility: CLINIC | Age: 64
End: 2019-07-02
Payer: SELF-PAY

## 2019-07-02 VITALS — SYSTOLIC BLOOD PRESSURE: 79 MMHG | DIASTOLIC BLOOD PRESSURE: 56 MMHG | HEART RATE: 99 BPM

## 2019-07-02 DIAGNOSIS — N40.1 BENIGN PROSTATIC HYPERPLASIA WITH LOWER URINARY TRACT SYMPMS: ICD-10-CM

## 2019-07-02 DIAGNOSIS — E88.09 OTHER DISORDERS OF PLASMA-PROTEIN METABOLISM, NOT ELSEWHERE CLASSIFIED: ICD-10-CM

## 2019-07-02 DIAGNOSIS — G47.00 INSOMNIA, UNSPECIFIED: ICD-10-CM

## 2019-07-02 DIAGNOSIS — L08.9 LOCAL INFECTION OF THE SKIN AND SUBCUTANEOUS TISSUE, UNSPECIFIED: ICD-10-CM

## 2019-07-02 DIAGNOSIS — Z87.09 PERSONAL HISTORY OF OTHER DISEASES OF THE RESPIRATORY SYSTEM: ICD-10-CM

## 2019-07-02 DIAGNOSIS — R39.89 OTHER SYMPTOMS AND SIGNS INVOLVING THE GENITOURINARY SYSTEM: ICD-10-CM

## 2019-07-02 DIAGNOSIS — N13.8 BENIGN PROSTATIC HYPERPLASIA WITH LOWER URINARY TRACT SYMPMS: ICD-10-CM

## 2019-07-02 DIAGNOSIS — Z87.440 PERSONAL HISTORY OF URINARY (TRACT) INFECTIONS: ICD-10-CM

## 2019-07-02 DIAGNOSIS — Z98.890 OTHER SPECIFIED POSTPROCEDURAL STATES: ICD-10-CM

## 2019-07-02 DIAGNOSIS — D62 ACUTE POSTHEMORRHAGIC ANEMIA: ICD-10-CM

## 2019-07-02 DIAGNOSIS — Z87.898 PERSONAL HISTORY OF OTHER SPECIFIED CONDITIONS: ICD-10-CM

## 2019-07-02 DIAGNOSIS — R35.1 NOCTURIA: ICD-10-CM

## 2019-07-02 PROCEDURE — 99215 OFFICE O/P EST HI 40 MIN: CPT

## 2019-07-02 NOTE — HISTORY OF PRESENT ILLNESS
[FreeTextEntry1] : 62 y/o old male with non- ischemic cardiomyopathy (LVAD 9/12/2017) here for follow up for peripheral artery disease and pre - transplant evaluation. \par \par Vascular testing\par  8/24/2018 LOULOU: Right 0.85 with TBI 0.5 (pressure of 51) Left LOULOU 0.99 with TBI of 0.5 (pressure of 52)\par Flat waveforms on the right distally\par \par 8/24/2018 Arterial duplex: Diffuse atherosclerotic disease seen at the arteries of both \par lower extremities. Diffuse tardus parvus waveforms seen throughout the patent arteries of both \par lower extremities may be indicative of significant bilateral iliac disease. \par Severe stenosis/focal occlusion of the right superficial femoral artery. \par Occluded bilateral peroneal arteries. \par \par CT Angio 8/27/2018\par Showed atherosclerotic plaque in the common iliac arteries. Moderate on the right, mild on the left. \par \par Over the past 6-7 months has been having bilateral (Right worse than Left) calf pain which occurs with walking 2-3 blocks and resolves with rest (approx. 10 minutes), rests for 15 minute intervals. Notes that the pain starts in the back and radiates to the bilateral calves. \par No rest pain or tissue loss or injury or amputations

## 2019-07-02 NOTE — REASON FOR VISIT
[Follow-Up - Clinic] : a clinic follow-up of [FreeTextEntry1] : 64 y/o old male with non- ischemic cardiomyopathy (LVAD 9/12/2017) here for follow up for peripheral artery disease and pre - transplant evaluation.

## 2019-07-02 NOTE — ASSESSMENT
[FreeTextEntry1] : 64 y/o old male with non- ischemic cardiomyopathy (LVAD 9/12/2017) here for follow up for peripheral artery disease and pre - transplant evaluation. \par \par 1. Peripheral artery disease, Lander 3 claudication   \par           - Reduced TBI \par           - R SFA occlusion Left SFA disease, inflow disease in the common iliac arteries (chronic dissection)\par 2. Chronic HFrEF s/p LVAD \par 3. HTN \par 4. HLD\par \par Plan\par -Discusse role for  peripheral intervention (inflow if it has gradients and R SFA), however patient wanted to contemplate this option \par - continue with OMT and walking program \par - continue with ASA and statin\par

## 2019-07-02 NOTE — PHYSICAL EXAM
[General Appearance - Well Developed] : well developed [Normal Appearance] : normal appearance [Well Groomed] : well groomed [General Appearance - Well Nourished] : well nourished [General Appearance - In No Acute Distress] : no acute distress [No Deformities] : no deformities [Exaggerated Use Of Accessory Muscles For Inspiration] : no accessory muscle use [Auscultation Breath Sounds / Voice Sounds] : lungs were clear to auscultation bilaterally [Respiration, Rhythm And Depth] : normal respiratory rhythm and effort [Murmurs] : no murmurs present [Heart Rate And Rhythm] : heart rate and rhythm were normal [Abdomen Soft] : soft [Abdomen Tenderness] : non-tender [Abnormal Walk] : normal gait [Abdomen Mass (___ Cm)] : no abdominal mass palpated [Gait - Sufficient For Exercise Testing] : the gait was sufficient for exercise testing [Skin Color & Pigmentation] : normal skin color and pigmentation [No Venous Stasis] : no venous stasis [] : no rash [Skin Lesions] : no skin lesions [No Xanthoma] : no  xanthoma was observed [No Skin Ulcers] : no skin ulcer [Oriented To Time, Place, And Person] : oriented to person, place, and time [Mood] : the mood was normal [Affect] : the affect was normal [No Anxiety] : not feeling anxious [Normal Oral Mucosa] : normal oral mucosa [No Oral Pallor] : no oral pallor [No Oral Cyanosis] : no oral cyanosis [FreeTextEntry1] : Non palpable pulses of the bilateral DP/medial malleoli and popiteal; feet are warm to palpation and no tissue loss

## 2019-07-03 ENCOUNTER — RESULT REVIEW (OUTPATIENT)
Age: 64
End: 2019-07-03

## 2019-07-10 ENCOUNTER — RX RENEWAL (OUTPATIENT)
Age: 64
End: 2019-07-10

## 2019-07-15 ENCOUNTER — APPOINTMENT (OUTPATIENT)
Dept: UROLOGY | Facility: CLINIC | Age: 64
End: 2019-07-15

## 2019-07-18 ENCOUNTER — OTHER (OUTPATIENT)
Age: 64
End: 2019-07-18

## 2019-07-23 ENCOUNTER — RESULT REVIEW (OUTPATIENT)
Age: 64
End: 2019-07-23

## 2019-07-25 ENCOUNTER — APPOINTMENT (OUTPATIENT)
Dept: CARDIOLOGY | Facility: CLINIC | Age: 64
End: 2019-07-25

## 2019-07-25 NOTE — PHYSICAL EXAM
[General Appearance - Well Nourished] : well nourished [General Appearance - Well Developed] : well developed [Normal Oral Mucosa] : normal oral mucosa [Respiration, Rhythm And Depth] : normal respiratory rhythm and effort [Auscultation Breath Sounds / Voice Sounds] : lungs were clear to auscultation bilaterally [Abdomen Tenderness] : non-tender [Abdomen Soft] : soft [Bowel Sounds] : normal bowel sounds [Abnormal Walk] : normal gait [Nail Clubbing] : no clubbing of the fingernails [] : no rash [Cyanosis, Localized] : no localized cyanosis [Skin Color & Pigmentation] : normal skin color and pigmentation [Oriented To Time, Place, And Person] : oriented to person, place, and time [No Anxiety] : not feeling anxious [FreeTextEntry1] : +hum of LVAD

## 2019-07-25 NOTE — ASSESSMENT
[FreeTextEntry1] : Mr. Quispe is a 64 year old man with ACC/AHA stage D systolic heart failure due to a nonischemic cardiomyopathy, s/p HM2 LVAD with tricuspid valve ring on 9/12/2017. There is hope that he will have recovery of his myocardium with ongoing uptitration of his neurohormonal antagonists, which he has thus far demonstrated. He is without evidence of pump malfunction or heart failure on exam. He was recently treated for a driveline infection (Corynebacterium), which appears to have resolved. He also has significant PAD with symptoms. \par \par \par

## 2019-07-25 NOTE — HISTORY OF PRESENT ILLNESS
[FreeTextEntry1] : Mr. Quispe is a 64 year old man with ACC/AHA stage D systolic heart failure due to a nonischemic cardiomyopathy who presented with abdominal pain and cardiogenic shock in the fall of 2017. Due to inotropic dependence, he underwent HM2 LVAD implantation with TV annuloplasty ring on 9/12/2017. His post-operative course was largely uncomplicated. He had mild rectal bleeding due to internal hemorrhoids. He also had a Cornyebacterium driveline infection. \par \par He was last seen in clinic 6/2019 where he reported claudication symptoms. He was referred to Dr. Lovelace who mentioned possible intervention but he wanted to contemplate this further. \par \par LVAD Interrogation:\par Device Type: HM2\par Speed: 9000\par Flow: 5.7\par Power: 5.6\par PI: 5.9\par MAP: 78\par Events:Rarel PI events w/o speed drops. \par No programming changes made\par Back up battery: 10 months -132\par Driveline dressing changed no erythema no drainage noted

## 2019-07-25 NOTE — DISCUSSION/SUMMARY
[FreeTextEntry3] : LVAD clinic [FreeTextEntry1] : # Stage D NICM s/p HM2 on 9/2017 as DT. There is no evidence of LVAD malfunction and there is no history of significant aortic insufficiency, RV dysfunction, stroke, or GIB. He has had a driveline infection. \par - continue current speed, flows are appropriate\par - diuretics: does not need at this time\par - continue to monitor LDH\par \par # Candidacy for heart transplantation\par - DT due to severe PAD\par \par # Antiplatelet/anticoagulation management\par - continue coumadin for goal INR 2-3\par - continue ASA\par \par # GDMT for chronic systolic heart failure and blood pressure management post VAD. Of note he has has improvement in LV function on echo though AV does not open despite a low speed\par - continue metoprolol succinate 200 mg daily and lisinopril 20 mg BID. Not on spironolactone due to hyperkalemia in the past. \par - MAP is at goal\par \par # Severe PAD with R SFA occlusion, Left SFA disease, and inflow disease in the common iliac arteries (chronic dissection)\par - follows with Dr. Lovelace, contemplating revascularization\par \par # Acute kidney injury: review of labs from last few months demonstrate rise in creatinine. This may have been related to spironolactone which has since been stopped\par - recheck labs today, if Cr still elevated after holding spironolactone will check UA and renal ultrasound with possible nephrology referral\par - continue off diuretics

## 2019-08-01 ENCOUNTER — RX RENEWAL (OUTPATIENT)
Age: 64
End: 2019-08-01

## 2019-08-07 ENCOUNTER — FORM ENCOUNTER (OUTPATIENT)
Age: 64
End: 2019-08-07

## 2019-08-08 ENCOUNTER — NON-APPOINTMENT (OUTPATIENT)
Age: 64
End: 2019-08-08

## 2019-08-08 ENCOUNTER — APPOINTMENT (OUTPATIENT)
Dept: CARDIOLOGY | Facility: CLINIC | Age: 64
End: 2019-08-08
Payer: MEDICAID

## 2019-08-08 ENCOUNTER — OUTPATIENT (OUTPATIENT)
Dept: OUTPATIENT SERVICES | Facility: HOSPITAL | Age: 64
LOS: 1 days | End: 2019-08-08
Payer: MEDICAID

## 2019-08-08 VITALS
BODY MASS INDEX: 24.34 KG/M2 | TEMPERATURE: 97.52 F | OXYGEN SATURATION: 98 % | WEIGHT: 170 LBS | HEART RATE: 94 BPM | HEIGHT: 70 IN

## 2019-08-08 DIAGNOSIS — Z95.811 PRESENCE OF HEART ASSIST DEVICE: Chronic | ICD-10-CM

## 2019-08-08 DIAGNOSIS — N18.3 CHRONIC KIDNEY DISEASE, STAGE 3 (MODERATE): ICD-10-CM

## 2019-08-08 DIAGNOSIS — Z95.811 PRESENCE OF HEART ASSIST DEVICE: ICD-10-CM

## 2019-08-08 DIAGNOSIS — Z98.890 OTHER SPECIFIED POSTPROCEDURAL STATES: Chronic | ICD-10-CM

## 2019-08-08 DIAGNOSIS — Z87.448 PERSONAL HISTORY OF OTHER DISEASES OF URINARY SYSTEM: ICD-10-CM

## 2019-08-08 PROCEDURE — 74150 CT ABDOMEN W/O CONTRAST: CPT | Mod: 26

## 2019-08-08 PROCEDURE — 99215 OFFICE O/P EST HI 40 MIN: CPT | Mod: 25

## 2019-08-08 PROCEDURE — 93750 INTERROGATION VAD IN PERSON: CPT

## 2019-08-08 PROCEDURE — 74150 CT ABDOMEN W/O CONTRAST: CPT

## 2019-08-08 RX ORDER — SPIRONOLACTONE 25 MG/1
25 TABLET ORAL
Qty: 90 | Refills: 1 | Status: DISCONTINUED | COMMUNITY
Start: 2019-02-22 | End: 2019-08-08

## 2019-08-08 NOTE — DISCUSSION/SUMMARY
[Patient] : the patient [___ Month(s)] : [unfilled] month(s) [FreeTextEntry3] : LVAD clinic [FreeTextEntry1] : - LVAD: I decreased pump speed to 8800 to increase LV filling and promote opening of the aortic valve. We will repeat an Echo at next clinic visit. He may be a candidate for LVAD turndown evaluation. \par - Congestive heart failure: Continue Lisinopril and Metoprolol at currently doses\par - Coumadin: Monitor INR closely and adjust warfarin accordingly, INR goal is 2.0-2.5. \par - Antiplatelets: Continue aspirin at 81mg every day.\par - PAD: Ongoing treatement by Dr. Lovelace. \par - CKD stage III with hyperkalemia: Stable. \par - Abdominal pain with blood drainage last week: Will order abdominal CT to investigate. \par - Education of LVAD system maintenance was reviewed with patient.\par - Supplies: [employer]

## 2019-08-08 NOTE — HISTORY OF PRESENT ILLNESS
[FreeTextEntry1] : Mr. Quispe is a 64 year old man with ACC/AHA stage D systolic heart failure due to a nonischemic cardiomyopathy who presented with abdominal pain and cardiogenic shock in the fall of 2017. Due to inotropic dependence, he underwent HM2 LVAD implantation as destination therapy with TV annuloplasty ring on 9/12/2017. His post-operative course was largely uncomplicated. He had mild rectal bleeding due to internal hemorrhoids. He had one readmission due to infection of unclear etiology in December 2017. It was not thought to be related to the LVAD. His history is also notable for peripheral arterial disease with claudication, which is a contributing factor in the decision to not list him for heart transplantation. \par \par Currently, he mostly complains of abdominal tightness and asymmetric swelling of his abdomen towards the right side with intermittent pain. He noted some bloody drainage last week from his driveline exit site, but none now. He notes less pain with ambulation in his left leg and at the moment is not interested in pursuing angioplasty. He is not dizzy. He has no dyspnea. He denies PND or orthopnea. He has no blood in his stools or urine. He denies LVAD alarms. \par \par LVAD Interrogation:\par Device Type: HM2\par Speed: 9000\par Flow: 4.1\par Power: 4.8\par PI: 5.6\par MAP: 72\par Events: PI events with 3 speed drops. \par Changes: Speed decreased to 8800. \par Back up battery:  -104\par Driveline dressing changed no erythema no drainage noted

## 2019-08-08 NOTE — ASSESSMENT
[FreeTextEntry1] : Mr. 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 He is euvolemic and normotensive. His LV is well decompressed with overall improvement in LV size and systolic function. His aortic valve does not open, but he does not have significant AI. He has abdominal pain and swelling.

## 2019-08-08 NOTE — PHYSICAL EXAM
[General Appearance - Well Developed] : well developed [General Appearance - Well Nourished] : well nourished [Normal Oral Mucosa] : normal oral mucosa [Respiration, Rhythm And Depth] : normal respiratory rhythm and effort [Auscultation Breath Sounds / Voice Sounds] : lungs were clear to auscultation bilaterally [Bowel Sounds] : normal bowel sounds [Abdomen Soft] : soft [Abdomen Tenderness] : non-tender [Abnormal Walk] : normal gait [Nail Clubbing] : no clubbing of the fingernails [Cyanosis, Localized] : no localized cyanosis [Skin Color & Pigmentation] : normal skin color and pigmentation [] : no rash [No Anxiety] : not feeling anxious [Oriented To Time, Place, And Person] : oriented to person, place, and time [Normal Conjunctiva] : the conjunctiva exhibited no abnormalities [Heart Rate And Rhythm] : heart rate and rhythm were normal [Edema] : no peripheral edema present [FreeTextEntry1] : Slight asymmetry to abdomen, more protuberant on right side.

## 2019-08-09 LAB
ALBUMIN SERPL ELPH-MCNC: 4.6 G/DL
ALP BLD-CCNC: 93 U/L
ALT SERPL-CCNC: 12 U/L
ANION GAP SERPL CALC-SCNC: 10 MMOL/L
AST SERPL-CCNC: 16 U/L
BASOPHILS # BLD AUTO: 0.06 K/UL
BASOPHILS NFR BLD AUTO: 1.4 %
BILIRUB SERPL-MCNC: 0.2 MG/DL
BUN SERPL-MCNC: 21 MG/DL
CALCIUM SERPL-MCNC: 9.3 MG/DL
CHLORIDE SERPL-SCNC: 107 MMOL/L
CO2 SERPL-SCNC: 22 MMOL/L
CREAT SERPL-MCNC: 1.61 MG/DL
EOSINOPHIL # BLD AUTO: 0.25 K/UL
EOSINOPHIL NFR BLD AUTO: 5.7 %
GLUCOSE SERPL-MCNC: 93 MG/DL
HCT VFR BLD CALC: 38.9 %
HGB BLD-MCNC: 12.3 G/DL
IMM GRANULOCYTES NFR BLD AUTO: 0.2 %
INR PPP: 2.38 RATIO
LDH SERPL-CCNC: 284 U/L
LYMPHOCYTES # BLD AUTO: 1.85 K/UL
LYMPHOCYTES NFR BLD AUTO: 42.3 %
MAN DIFF?: NORMAL
MCHC RBC-ENTMCNC: 30.5 PG
MCHC RBC-ENTMCNC: 31.6 GM/DL
MCV RBC AUTO: 96.5 FL
MONOCYTES # BLD AUTO: 0.47 K/UL
MONOCYTES NFR BLD AUTO: 10.8 %
NEUTROPHILS # BLD AUTO: 1.73 K/UL
NEUTROPHILS NFR BLD AUTO: 39.6 %
NT-PROBNP SERPL-MCNC: 255 PG/ML
PLATELET # BLD AUTO: 168 K/UL
POTASSIUM SERPL-SCNC: 5.7 MMOL/L
PROT SERPL-MCNC: 7.8 G/DL
PT BLD: 27.6 SEC
RBC # BLD: 4.03 M/UL
RBC # FLD: 14.6 %
SODIUM SERPL-SCNC: 138 MMOL/L
WBC # FLD AUTO: 4.37 K/UL

## 2019-08-14 ENCOUNTER — RESULT REVIEW (OUTPATIENT)
Age: 64
End: 2019-08-14

## 2019-08-20 ENCOUNTER — RESULT REVIEW (OUTPATIENT)
Age: 64
End: 2019-08-20

## 2019-08-28 ENCOUNTER — RESULT REVIEW (OUTPATIENT)
Age: 64
End: 2019-08-28

## 2019-08-30 ENCOUNTER — RX RENEWAL (OUTPATIENT)
Age: 64
End: 2019-08-30

## 2019-09-03 ENCOUNTER — RX RENEWAL (OUTPATIENT)
Age: 64
End: 2019-09-03

## 2019-09-04 ENCOUNTER — RESULT REVIEW (OUTPATIENT)
Age: 64
End: 2019-09-04

## 2019-09-05 ENCOUNTER — APPOINTMENT (OUTPATIENT)
Dept: CARDIOLOGY | Facility: CLINIC | Age: 64
End: 2019-09-05
Payer: MEDICAID

## 2019-09-05 ENCOUNTER — APPOINTMENT (OUTPATIENT)
Dept: CV DIAGNOSITCS | Facility: HOSPITAL | Age: 64
End: 2019-09-05

## 2019-09-05 VITALS
BODY MASS INDEX: 24.62 KG/M2 | OXYGEN SATURATION: 98 % | HEART RATE: 55 BPM | DIASTOLIC BLOOD PRESSURE: 72 MMHG | TEMPERATURE: 97.88 F | WEIGHT: 172 LBS | SYSTOLIC BLOOD PRESSURE: 101 MMHG | HEIGHT: 70 IN

## 2019-09-05 PROCEDURE — 99215 OFFICE O/P EST HI 40 MIN: CPT | Mod: 25

## 2019-09-05 PROCEDURE — 93000 ELECTROCARDIOGRAM COMPLETE: CPT

## 2019-09-05 PROCEDURE — 93750 INTERROGATION VAD IN PERSON: CPT

## 2019-09-06 ENCOUNTER — RX RENEWAL (OUTPATIENT)
Age: 64
End: 2019-09-06

## 2019-09-06 RX ORDER — PANTOPRAZOLE 40 MG/1
40 TABLET, DELAYED RELEASE ORAL
Qty: 90 | Refills: 3 | Status: DISCONTINUED | COMMUNITY
Start: 2019-02-06 | End: 2019-09-06

## 2019-09-07 NOTE — DISCUSSION/SUMMARY
[Patient] : the patient [___ Month(s)] : [unfilled] month(s) [FreeTextEntry3] : LVAD clinic [FreeTextEntry1] : - LVAD: continue speed at 8800 to increase LV filling and promote opening of the aortic valve. Repeat an Echo at next clinic visit. He may be a candidate for LVAD turndown evaluation. \par - Congestive heart failure: Continue Lisinopril and Metoprolol at currently doses. Will have to clarify with pharmacy what he's taking\par - Coumadin: Monitor INR closely and adjust warfarin accordingly, INR goal is 2.0-2.5. \par - Antiplatelets: Continue aspirin at 81mg every day.\par - PAD: Ongoing treatment by Dr. Lovelaec. Continue discussion with patient regarding intervention\par - CKD stage III with hyperkalemia: Stable. \par - Abdominal pain with blood drainage last week: Abdominal CT w/o pathology \par - Education of LVAD system maintenance was reviewed with patient.\par - Supplies: [employer]

## 2019-09-07 NOTE — HISTORY OF PRESENT ILLNESS
[FreeTextEntry1] : Mr. Quispe is a 64 year old man with ACC/AHA stage D systolic heart failure due to a nonischemic cardiomyopathy who presented with abdominal pain and cardiogenic shock in the fall of 2017. Due to inotropic dependence, he underwent HM2 LVAD implantation as destination therapy with TV annuloplasty ring on 9/12/2017. His post-operative course was largely uncomplicated. He had mild rectal bleeding due to internal hemorrhoids. He had one readmission due to infection of unclear etiology in December 2017. It was not thought to be related to the LVAD. His history is also notable for peripheral arterial disease with claudication, which is a contributing factor in the decision to not list him for heart transplantation. \par \par Currently, he mostly complains of pain in his left leg despite walking and would consider vascular intervention if insurance will cover.  He is not dizzy. He has no dyspnea. He denies PND or orthopnea. He has no blood in his stools or urine. He denies LVAD alarms. At last visit, his speed was reduced to 8800 and echo was to be done but did not have. \par \par LVAD Interrogation:\par Device Type: HM2\par Speed: 8800\par Flow: 4.5\par Power: 4.8\par PI: 6.2\par MAP: 80\par Events: Multiple PI events with speed drops\par Back up battery:  -455 had less than 7 months. Primary Battery Changed #[id number] Expires in 31 months, Back-up controller changed #[id number] expires 31months. \par Driveline dressing changed no erythema no drainage noted

## 2019-09-07 NOTE — PHYSICAL EXAM
[Normal Conjunctiva] : the conjunctiva exhibited no abnormalities [General Appearance - Well Developed] : well developed [General Appearance - Well Nourished] : well nourished [Normal Oral Mucosa] : normal oral mucosa [Respiration, Rhythm And Depth] : normal respiratory rhythm and effort [Heart Rate And Rhythm] : heart rate and rhythm were normal [Auscultation Breath Sounds / Voice Sounds] : lungs were clear to auscultation bilaterally [Edema] : no peripheral edema present [Bowel Sounds] : normal bowel sounds [Abdomen Tenderness] : non-tender [Abdomen Soft] : soft [Abnormal Walk] : normal gait [Nail Clubbing] : no clubbing of the fingernails [Cyanosis, Localized] : no localized cyanosis [Skin Color & Pigmentation] : normal skin color and pigmentation [] : no rash [No Anxiety] : not feeling anxious [Oriented To Time, Place, And Person] : oriented to person, place, and time [FreeTextEntry1] : Slight asymmetry to abdomen, more protuberant on right side.

## 2019-09-09 ENCOUNTER — RX RENEWAL (OUTPATIENT)
Age: 64
End: 2019-09-09

## 2019-09-09 ENCOUNTER — MEDICATION RENEWAL (OUTPATIENT)
Age: 64
End: 2019-09-09

## 2019-09-09 LAB — BACTERIA SPEC CULT: ABNORMAL

## 2019-09-10 ENCOUNTER — RESULT REVIEW (OUTPATIENT)
Age: 64
End: 2019-09-10

## 2019-09-11 ENCOUNTER — OTHER (OUTPATIENT)
Age: 64
End: 2019-09-11

## 2019-09-11 RX ORDER — ALFUZOSIN HYDROCHLORIDE 10 MG/1
10 TABLET, EXTENDED RELEASE ORAL
Qty: 30 | Refills: 11 | Status: DISCONTINUED | COMMUNITY
Start: 2019-02-27 | End: 2019-09-11

## 2019-09-17 ENCOUNTER — RESULT REVIEW (OUTPATIENT)
Age: 64
End: 2019-09-17

## 2019-10-03 ENCOUNTER — APPOINTMENT (OUTPATIENT)
Dept: CARDIOLOGY | Facility: CLINIC | Age: 64
End: 2019-10-03
Payer: MEDICAID

## 2019-10-03 VITALS
SYSTOLIC BLOOD PRESSURE: 96 MMHG | WEIGHT: 174 LBS | HEIGHT: 70 IN | BODY MASS INDEX: 24.91 KG/M2 | TEMPERATURE: 97.5 F | DIASTOLIC BLOOD PRESSURE: 55 MMHG | OXYGEN SATURATION: 100 % | HEART RATE: 57 BPM | RESPIRATION RATE: 12 BRPM

## 2019-10-03 LAB
ALBUMIN SERPL ELPH-MCNC: 4.5 G/DL
ALP BLD-CCNC: 89 U/L
ALT SERPL-CCNC: 14 U/L
ANION GAP SERPL CALC-SCNC: 11 MMOL/L
AST SERPL-CCNC: 23 U/L
BASOPHILS # BLD AUTO: 0.07 K/UL
BASOPHILS NFR BLD AUTO: 1.7 %
BILIRUB SERPL-MCNC: 0.2 MG/DL
BUN SERPL-MCNC: 15 MG/DL
CALCIUM SERPL-MCNC: 9.1 MG/DL
CHLORIDE SERPL-SCNC: 105 MMOL/L
CO2 SERPL-SCNC: 24 MMOL/L
CREAT SERPL-MCNC: 1.32 MG/DL
EOSINOPHIL # BLD AUTO: 0.2 K/UL
EOSINOPHIL NFR BLD AUTO: 5 %
GLUCOSE SERPL-MCNC: 76 MG/DL
HCT VFR BLD CALC: 39.6 %
HGB BLD-MCNC: 12.6 G/DL
IMM GRANULOCYTES NFR BLD AUTO: 0.2 %
LYMPHOCYTES # BLD AUTO: 1.53 K/UL
LYMPHOCYTES NFR BLD AUTO: 38 %
MAN DIFF?: NORMAL
MCHC RBC-ENTMCNC: 30.1 PG
MCHC RBC-ENTMCNC: 31.8 GM/DL
MCV RBC AUTO: 94.7 FL
MONOCYTES # BLD AUTO: 0.39 K/UL
MONOCYTES NFR BLD AUTO: 9.7 %
NEUTROPHILS # BLD AUTO: 1.83 K/UL
NEUTROPHILS NFR BLD AUTO: 45.4 %
NT-PROBNP SERPL-MCNC: 145 PG/ML
PLATELET # BLD AUTO: 162 K/UL
POTASSIUM SERPL-SCNC: 5.3 MMOL/L
PROT SERPL-MCNC: 7.7 G/DL
RBC # BLD: 4.18 M/UL
RBC # FLD: 14.3 %
SODIUM SERPL-SCNC: 139 MMOL/L
WBC # FLD AUTO: 4.03 K/UL

## 2019-10-03 PROCEDURE — 93000 ELECTROCARDIOGRAM COMPLETE: CPT

## 2019-10-03 PROCEDURE — 93750 INTERROGATION VAD IN PERSON: CPT

## 2019-10-03 PROCEDURE — 99215 OFFICE O/P EST HI 40 MIN: CPT | Mod: 25

## 2019-10-03 RX ORDER — OXYBUTYNIN CHLORIDE 10 MG/1
10 TABLET, EXTENDED RELEASE ORAL
Qty: 90 | Refills: 0 | Status: DISCONTINUED | COMMUNITY
Start: 2018-07-26 | End: 2019-10-03

## 2019-10-03 NOTE — DISCUSSION/SUMMARY
[Patient] : the patient [___ Month(s)] : [unfilled] month(s) [FreeTextEntry3] : LVAD clinic [FreeTextEntry1] : - LVAD: continue speed at 8800 to increase LV filling and promote opening of the aortic valve. Repeat an Echo at next clinic visit. He may be a candidate for LVAD turndown evaluation. \par - Congestive heart failure: Continue Lisinopril and Metoprolol at currently doses. \par - Coumadin: Monitor INR closely and adjust warfarin accordingly, INR goal is 2.0-2.5. \par - Antiplatelets: Continue aspirin at 81mg every day.\par - PAD: Ongoing treatment by Dr. Lovelace. Continue discussion with patient regarding intervention but he reports this is improved\par - CKD stage III with hyperkalemia: Stable. \par - Abdominal pain with blood drainage last week: Abdominal CT w/o pathology \par - Education of LVAD system maintenance was reviewed with patient.\par - Supplies: Gary

## 2019-10-03 NOTE — HISTORY OF PRESENT ILLNESS
[FreeTextEntry1] : Mr. Quispe is a 64 year old man with ACC/AHA stage D systolic heart failure due to a nonischemic cardiomyopathy who presented with abdominal pain and cardiogenic shock in the fall of 2017. Due to inotropic dependence, he underwent HM2 LVAD implantation as destination therapy with TV annuloplasty ring on 9/12/2017. His post-operative course was largely uncomplicated. He had mild rectal bleeding due to internal hemorrhoids. He had one readmission due to infection of unclear etiology in December 2017. It was not thought to be related to the LVAD. His history is also notable for peripheral arterial disease with claudication, which is a contributing factor in the decision to not list him for heart transplantation. \par \par Last time, he complained mostly of pain in his left leg despite walking regimen but since he states that has improved and is now reporting more back pain that is present when sleeping and walking w/o radiation.  He is not dizzy. He has no dyspnea. He denies PND or orthopnea. He has no blood in his stools or urine. He denies LVAD alarms. \par \par LVAD Interrogation:\par Device Type: HM2\par Speed: 8800\par Flow: 5.1\par Power: 5.2\par PI: 6.5\par MAP: 69\par Events: clock needs to be changed\par Back up battery:  -226 had less than 7 months. Primary Battery Changed #[id number] Expires in 31 months, Back-up controller changed #[id number] expires 31months. \par Driveline dressing changed no erythema no drainage noted

## 2019-10-03 NOTE — PHYSICAL EXAM
[General Appearance - Well Developed] : well developed [Normal Conjunctiva] : the conjunctiva exhibited no abnormalities [General Appearance - Well Nourished] : well nourished [Normal Oral Mucosa] : normal oral mucosa [Respiration, Rhythm And Depth] : normal respiratory rhythm and effort [Auscultation Breath Sounds / Voice Sounds] : lungs were clear to auscultation bilaterally [Heart Rate And Rhythm] : heart rate and rhythm were normal [Edema] : no peripheral edema present [Bowel Sounds] : normal bowel sounds [Abdomen Soft] : soft [Abdomen Tenderness] : non-tender [Abnormal Walk] : normal gait [Nail Clubbing] : no clubbing of the fingernails [Cyanosis, Localized] : no localized cyanosis [Skin Color & Pigmentation] : normal skin color and pigmentation [] : no rash [Oriented To Time, Place, And Person] : oriented to person, place, and time [No Anxiety] : not feeling anxious [FreeTextEntry1] : Slight asymmetry to abdomen, more protuberant on right side.

## 2019-10-09 ENCOUNTER — RESULT REVIEW (OUTPATIENT)
Age: 64
End: 2019-10-09

## 2019-10-18 ENCOUNTER — RESULT REVIEW (OUTPATIENT)
Age: 64
End: 2019-10-18

## 2019-10-23 ENCOUNTER — RESULT REVIEW (OUTPATIENT)
Age: 64
End: 2019-10-23

## 2019-10-30 ENCOUNTER — RESULT REVIEW (OUTPATIENT)
Age: 64
End: 2019-10-30

## 2019-10-30 ENCOUNTER — MED ADMIN CHARGE (OUTPATIENT)
Age: 64
End: 2019-10-30

## 2019-11-05 ENCOUNTER — RESULT REVIEW (OUTPATIENT)
Age: 64
End: 2019-11-05

## 2019-11-12 ENCOUNTER — RESULT REVIEW (OUTPATIENT)
Age: 64
End: 2019-11-12

## 2019-11-14 ENCOUNTER — OUTPATIENT (OUTPATIENT)
Dept: OUTPATIENT SERVICES | Facility: HOSPITAL | Age: 64
LOS: 1 days | End: 2019-11-14
Payer: MEDICAID

## 2019-11-14 ENCOUNTER — NON-APPOINTMENT (OUTPATIENT)
Age: 64
End: 2019-11-14

## 2019-11-14 ENCOUNTER — APPOINTMENT (OUTPATIENT)
Dept: CARDIOLOGY | Facility: CLINIC | Age: 64
End: 2019-11-14
Payer: MEDICAID

## 2019-11-14 ENCOUNTER — APPOINTMENT (OUTPATIENT)
Dept: CV DIAGNOSITCS | Facility: HOSPITAL | Age: 64
End: 2019-11-14

## 2019-11-14 VITALS
TEMPERATURE: 98.2 F | HEIGHT: 70 IN | BODY MASS INDEX: 24.62 KG/M2 | HEART RATE: 79 BPM | OXYGEN SATURATION: 100 % | WEIGHT: 172 LBS | RESPIRATION RATE: 12 BRPM

## 2019-11-14 DIAGNOSIS — Z98.890 OTHER SPECIFIED POSTPROCEDURAL STATES: Chronic | ICD-10-CM

## 2019-11-14 DIAGNOSIS — Z95.811 PRESENCE OF HEART ASSIST DEVICE: ICD-10-CM

## 2019-11-14 DIAGNOSIS — Z95.811 PRESENCE OF HEART ASSIST DEVICE: Chronic | ICD-10-CM

## 2019-11-14 PROCEDURE — 93750 INTERROGATION VAD IN PERSON: CPT

## 2019-11-14 PROCEDURE — 99215 OFFICE O/P EST HI 40 MIN: CPT | Mod: 25

## 2019-11-14 PROCEDURE — 93000 ELECTROCARDIOGRAM COMPLETE: CPT

## 2019-11-14 PROCEDURE — 93306 TTE W/DOPPLER COMPLETE: CPT | Mod: 26

## 2019-11-14 PROCEDURE — 93306 TTE W/DOPPLER COMPLETE: CPT

## 2019-11-14 NOTE — DISCUSSION/SUMMARY
[Patient] : the patient [___ Month(s)] : [unfilled] month(s) [FreeTextEntry3] : LVAD clinic [FreeTextEntry1] : - LVAD: continue speed at 8800. Appears to be pulsatilie. Repeat an Echo at next clinic visit. He may be a candidate for LVAD turndown evaluation. \par - Congestive heart failure: Continue Lisinopril 20 mg twice/day and Metoprolol at currently doses. \par - Coumadin: Monitor INR closely and adjust warfarin accordingly, INR goal is 2.0-2.5. \par - Antiplatelets: Continue aspirin at 81mg every day.\par - PAD: Ongoing treatment by Dr. Lovelace. Continue discussion with patient regarding intervention but he reports this is improved\par - CKD stage III with hyperkalemia; improved from prior \par - Abdominal pain with blood drainage last week: Abdominal CT w/o pathology \par - Education of LVAD system maintenance was reviewed with patient.\par - Supplies: Gary

## 2019-11-14 NOTE — HISTORY OF PRESENT ILLNESS
[FreeTextEntry1] : Mr. Quispe is a 64 year old man with ACC/AHA stage D systolic heart failure due to a nonischemic cardiomyopathy who presented with abdominal pain and cardiogenic shock in the fall of 2017. Due to inotropic dependence, he underwent HM2 LVAD implantation as destination therapy with TV annuloplasty ring on 9/12/2017. His post-operative course was largely uncomplicated. He had mild rectal bleeding due to internal hemorrhoids. He had one readmission due to infection of unclear etiology in December 2017. It was not thought to be related to the LVAD. His history is also notable for peripheral arterial disease with claudication, which is a contributing factor in the decision to not list him for heart transplantation. \par \par He reports claudication symptoms have improved but still notes it but less severe. He is not dizzy. He has no dyspnea. He denies PND or orthopnea. He has no blood in his stools or urine. He denies LVAD alarms. \par \par LVAD Interrogation:\par Device Type: HM2\par Speed: 8800\par Flow: 5\par Power: 4.9\par PI: 6.2\par MAP: 82\par Back up battery:  -451 had less than 7 months. Primary Battery Changed #[id number] Expires in 31 months, Back-up controller changed #[id number] expires 31months. \par Driveline dressing changed no erythema no drainage noted

## 2019-11-14 NOTE — ASSESSMENT
[FreeTextEntry1] : Mr. 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 He is euvolemic and normotensive. His LV is well decompressed with overall improvement in LV size and systolic function. His aortic valve does not open, but he does not have significant AI. His claudication is improved compared with prior.

## 2019-11-14 NOTE — PHYSICAL EXAM
[General Appearance - Well Developed] : well developed [General Appearance - Well Nourished] : well nourished [Normal Conjunctiva] : the conjunctiva exhibited no abnormalities [Normal Oral Mucosa] : normal oral mucosa [Respiration, Rhythm And Depth] : normal respiratory rhythm and effort [Heart Rate And Rhythm] : heart rate and rhythm were normal [Auscultation Breath Sounds / Voice Sounds] : lungs were clear to auscultation bilaterally [Edema] : no peripheral edema present [Bowel Sounds] : normal bowel sounds [Abdomen Soft] : soft [Abdomen Tenderness] : non-tender [Abnormal Walk] : normal gait [Nail Clubbing] : no clubbing of the fingernails [Skin Color & Pigmentation] : normal skin color and pigmentation [Cyanosis, Localized] : no localized cyanosis [Oriented To Time, Place, And Person] : oriented to person, place, and time [] : no rash [No Anxiety] : not feeling anxious [FreeTextEntry1] : Typical LVAD sounds.

## 2019-11-27 ENCOUNTER — RESULT REVIEW (OUTPATIENT)
Age: 64
End: 2019-11-27

## 2019-12-10 ENCOUNTER — RESULT REVIEW (OUTPATIENT)
Age: 64
End: 2019-12-10

## 2019-12-24 ENCOUNTER — RESULT REVIEW (OUTPATIENT)
Age: 64
End: 2019-12-24

## 2019-12-31 ENCOUNTER — RESULT REVIEW (OUTPATIENT)
Age: 64
End: 2019-12-31

## 2020-01-07 ENCOUNTER — RESULT REVIEW (OUTPATIENT)
Age: 65
End: 2020-01-07

## 2020-01-09 ENCOUNTER — APPOINTMENT (OUTPATIENT)
Dept: HEART FAILURE | Facility: CLINIC | Age: 65
End: 2020-01-09
Payer: MEDICARE

## 2020-01-09 ENCOUNTER — NON-APPOINTMENT (OUTPATIENT)
Age: 65
End: 2020-01-09

## 2020-01-09 VITALS
OXYGEN SATURATION: 98 % | HEART RATE: 63 BPM | RESPIRATION RATE: 12 BRPM | SYSTOLIC BLOOD PRESSURE: 113 MMHG | TEMPERATURE: 98.06 F | BODY MASS INDEX: 25.91 KG/M2 | WEIGHT: 181 LBS | DIASTOLIC BLOOD PRESSURE: 73 MMHG | HEIGHT: 70 IN

## 2020-01-09 LAB
ALBUMIN SERPL ELPH-MCNC: 4.1 G/DL
ALP BLD-CCNC: 78 U/L
ALT SERPL-CCNC: 17 U/L
ANION GAP SERPL CALC-SCNC: 8 MMOL/L
AST SERPL-CCNC: 26 U/L
BASOPHILS # BLD AUTO: 0.06 K/UL
BASOPHILS NFR BLD AUTO: 1.6 %
BILIRUB SERPL-MCNC: 0.2 MG/DL
BUN SERPL-MCNC: 7 MG/DL
CALCIUM SERPL-MCNC: 9.1 MG/DL
CHLORIDE SERPL-SCNC: 104 MMOL/L
CO2 SERPL-SCNC: 26 MMOL/L
CREAT SERPL-MCNC: 0.93 MG/DL
EOSINOPHIL # BLD AUTO: 0.19 K/UL
EOSINOPHIL NFR BLD AUTO: 1.6 %
GLUCOSE SERPL-MCNC: 78 MG/DL
HCT VFR BLD CALC: 38.8 %
HGB BLD-MCNC: 12 G/DL
LYMPHOCYTES # BLD AUTO: 1.42 K/UL
LYMPHOCYTES NFR BLD AUTO: 37.1 %
MAN DIFF?: NO
MCHC RBC-ENTMCNC: 29.3 PG
MCHC RBC-ENTMCNC: 30.9 GM/DL
MCV RBC AUTO: 94.9 FL
MONOCYTES # BLD AUTO: 0.48 K/UL
MONOCYTES NFR BLD AUTO: 12.5 %
NEUTROPHILS # BLD AUTO: 1.67 K/UL
NEUTROPHILS NFR BLD AUTO: 43.5 %
NT-PROBNP SERPL-MCNC: 174 PG/ML
PLATELET # BLD AUTO: 143 K/UL
POTASSIUM SERPL-SCNC: 3.7 MMOL/L
PROT SERPL-MCNC: 7.7 G/DL
RBC # BLD: 4.09 M/UL
RBC # FLD: 14.3 %
SODIUM SERPL-SCNC: 138 MMOL/L
WBC # FLD AUTO: 3.83 K/UL

## 2020-01-09 PROCEDURE — 99215 OFFICE O/P EST HI 40 MIN: CPT | Mod: 25

## 2020-01-09 PROCEDURE — 93000 ELECTROCARDIOGRAM COMPLETE: CPT

## 2020-01-09 PROCEDURE — 93750 INTERROGATION VAD IN PERSON: CPT

## 2020-01-09 NOTE — HISTORY OF PRESENT ILLNESS
[FreeTextEntry1] : Mr. Quispe is a 64 year old man with ACC/AHA stage D systolic heart failure due to a nonischemic cardiomyopathy who presented with abdominal pain and cardiogenic shock in the fall of 2017. Due to inotropic dependence, he underwent HM2 LVAD implantation as destination therapy with TV annuloplasty ring on 9/12/2017. His post-operative course was largely uncomplicated. He had mild rectal bleeding due to internal hemorrhoids. He had one readmission due to infection of unclear etiology in December 2017. It was not thought to be related to the LVAD. His history is also notable for peripheral arterial disease with claudication, which is a contributing factor in the decision to not list him for heart transplantation. \par \par He reports claudication symptoms have improved. He is not dizzy. He has no dyspnea. He denies PND or orthopnea. He has no blood in his stools or urine. He denies LVAD alarms. \par \par He would like to have cataract surgery for his left eye and is scheduled on 1/22. \par \par LVAD Interrogation:\par Device Type: HM2\par Speed: 8800\par Flow: 4.8\par Power: 4.9\par PI: 6.8\par MAP: 80 (/73)\par Back up battery:  -159 had less than 7 months. Primary Battery Changed #[id number] Expires in 31 months, Back-up controller changed #[id number] expires 31months. \par Driveline dressing changed no erythema some drainage noted (no culture done)

## 2020-01-09 NOTE — DISCUSSION/SUMMARY
[Patient] : the patient [___ Month(s)] : [unfilled] month(s) [FreeTextEntry3] : LVAD clinic [FreeTextEntry1] : - LVAD: continue speed at 8800. Appears to be pulsatilie. Repeat an Echo at next clinic visit. He may be a candidate for LVAD turndown evaluation. \par - Congestive heart failure: Continue Lisinopril 20 mg twice/day and Metoprolol at currently doses. \par - Coumadin: Monitor INR closely and adjust warfarin accordingly, INR goal is 2.0-2.5. If needs to be adjusted prior to cataract surgery, please inform us and we can adjust accordingly\par - Antiplatelets: Continue aspirin at 81mg every day. If needs to be held for 3-5 days prior to cataract surgery then can be held. \par - PAD: Ongoing treatment by Dr. Lovelace. Since improved, no indication for revascularization\par - CKD stage III with hyperkalemia; improved from prior \par - Education of LVAD system maintenance was reviewed with patient.\par - Supplies: Gary

## 2020-01-09 NOTE — ASSESSMENT
[FreeTextEntry1] : Mr. 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 He is euvolemic and normotensive. His LV is well decompressed with overall improvement in LV size and systolic function. His aortic valve does not open, but he does not have significant AI. His claudication is improved compared with prior. He is optimized and cleared from cardiovascular standpoint to undergo cataract surgery and does not require any further workup prior to surgery.

## 2020-01-09 NOTE — PHYSICAL EXAM
[General Appearance - Well Developed] : well developed [General Appearance - Well Nourished] : well nourished [Normal Conjunctiva] : the conjunctiva exhibited no abnormalities [Normal Oral Mucosa] : normal oral mucosa [Respiration, Rhythm And Depth] : normal respiratory rhythm and effort [Auscultation Breath Sounds / Voice Sounds] : lungs were clear to auscultation bilaterally [Heart Rate And Rhythm] : heart rate and rhythm were normal [Edema] : no peripheral edema present [FreeTextEntry1] : Typical LVAD sounds.  [Bowel Sounds] : normal bowel sounds [Abdomen Soft] : soft [Abdomen Tenderness] : non-tender [Abnormal Walk] : normal gait [Nail Clubbing] : no clubbing of the fingernails [Cyanosis, Localized] : no localized cyanosis [Skin Color & Pigmentation] : normal skin color and pigmentation [] : no rash [Oriented To Time, Place, And Person] : oriented to person, place, and time [No Anxiety] : not feeling anxious

## 2020-01-14 ENCOUNTER — RESULT REVIEW (OUTPATIENT)
Age: 65
End: 2020-01-14

## 2020-01-22 ENCOUNTER — RESULT REVIEW (OUTPATIENT)
Age: 65
End: 2020-01-22

## 2020-01-30 ENCOUNTER — RESULT REVIEW (OUTPATIENT)
Age: 65
End: 2020-01-30

## 2020-02-26 ENCOUNTER — NON-APPOINTMENT (OUTPATIENT)
Age: 65
End: 2020-02-26

## 2020-03-01 ENCOUNTER — OUTPATIENT (OUTPATIENT)
Dept: OUTPATIENT SERVICES | Facility: HOSPITAL | Age: 65
LOS: 1 days | End: 2020-03-01
Payer: MEDICARE

## 2020-03-01 DIAGNOSIS — Z95.811 PRESENCE OF HEART ASSIST DEVICE: Chronic | ICD-10-CM

## 2020-03-01 DIAGNOSIS — Z98.890 OTHER SPECIFIED POSTPROCEDURAL STATES: Chronic | ICD-10-CM

## 2020-03-01 PROCEDURE — G9001: CPT

## 2020-03-03 NOTE — PROGRESS NOTE ADULT - PROBLEM SELECTOR PROBLEM 1
LVAD (left ventricular assist device) present [General Appearance - Well Developed] : well developed [Normal Appearance] : normal appearance [Well Groomed] : well groomed [General Appearance - Well Nourished] : well nourished [No Deformities] : no deformities [Normal Conjunctiva] : the conjunctiva exhibited no abnormalities [General Appearance - In No Acute Distress] : no acute distress [Eyelids - No Xanthelasma] : the eyelids demonstrated no xanthelasmas [Normal Jugular Venous A Waves Present] : normal jugular venous A waves present [Normal Jugular Venous V Waves Present] : normal jugular venous V waves present [No Jugular Venous Gama A Waves] : no jugular venous gama A waves [Respiration, Rhythm And Depth] : normal respiratory rhythm and effort [Heart Rate And Rhythm] : heart rate and rhythm were normal [Auscultation Breath Sounds / Voice Sounds] : lungs were clear to auscultation bilaterally [Bowel Sounds] : normal bowel sounds [Abnormal Walk] : normal gait [Nail Clubbing] : no clubbing of the fingernails [Cyanosis, Localized] : no localized cyanosis [Skin Color & Pigmentation] : normal skin color and pigmentation [] : no rash [No Venous Stasis] : no venous stasis [Oriented To Time, Place, And Person] : oriented to person, place, and time [Impaired Insight] : insight and judgment were intact [Mood] : the mood was normal [Affect] : the affect was normal [FreeTextEntry1] : No bruit.  Soft, non-tender.  Liver and spleen not palpable; aortic pulsation not palpable.

## 2020-03-05 ENCOUNTER — APPOINTMENT (OUTPATIENT)
Dept: CV DIAGNOSITCS | Facility: HOSPITAL | Age: 65
End: 2020-03-05

## 2020-03-05 ENCOUNTER — NON-APPOINTMENT (OUTPATIENT)
Age: 65
End: 2020-03-05

## 2020-03-05 ENCOUNTER — APPOINTMENT (OUTPATIENT)
Dept: HEART FAILURE | Facility: CLINIC | Age: 65
End: 2020-03-05
Payer: MEDICARE

## 2020-03-05 VITALS
HEIGHT: 70 IN | SYSTOLIC BLOOD PRESSURE: 107 MMHG | OXYGEN SATURATION: 97 % | TEMPERATURE: 97.34 F | RESPIRATION RATE: 12 BRPM | BODY MASS INDEX: 24.91 KG/M2 | HEART RATE: 90 BPM | DIASTOLIC BLOOD PRESSURE: 77 MMHG | WEIGHT: 174 LBS

## 2020-03-05 DIAGNOSIS — I73.9 PERIPHERAL VASCULAR DISEASE, UNSPECIFIED: ICD-10-CM

## 2020-03-05 PROCEDURE — 93750 INTERROGATION VAD IN PERSON: CPT

## 2020-03-05 PROCEDURE — 99214 OFFICE O/P EST MOD 30 MIN: CPT

## 2020-03-05 PROCEDURE — 93000 ELECTROCARDIOGRAM COMPLETE: CPT

## 2020-03-05 RX ORDER — CEPHALEXIN 500 MG/1
500 CAPSULE ORAL 3 TIMES DAILY
Qty: 42 | Refills: 3 | Status: DISCONTINUED | COMMUNITY
Start: 2020-01-09 | End: 2020-03-05

## 2020-03-05 NOTE — HISTORY OF PRESENT ILLNESS
[FreeTextEntry1] : Mr. Quispe is a 64 year old man with ACC/AHA stage D systolic heart failure due to a nonischemic cardiomyopathy who presented with abdominal pain and cardiogenic shock in the fall of 2017. Due to inotropic dependence, he underwent HM2 LVAD implantation as destination therapy with TV annuloplasty ring on 9/12/2017. His post-operative course was largely uncomplicated. He had mild rectal bleeding due to internal hemorrhoids. He had one readmission due to infection of unclear etiology in December 2017. It was not thought to be related to the LVAD. His history is also notable for peripheral arterial disease with claudication, which is a contributing factor in the decision to not list him for heart transplantation. \par \par He reports claudication symptoms have improved. He is not dizzy. He has no dyspnea. He denies PND or orthopnea. He has no blood in his stools or urine. He denies LVAD alarms. \par \par \par LVAD Interrogation:\par Device Type: HM2\par Speed: 8800\par Flow: 5.0\par Power: 5.0\par PI: 6.8\par EKG: SR 90's no ectopy\par MAP: 87 (/77); Doppler MAP 80\par Back up battery: Primary Battery [id number] Expires in 25 months \par Driveline dressing changed no erythema, drainage noted

## 2020-03-05 NOTE — PHYSICAL EXAM
[General Appearance - Well Developed] : well developed [Normal Appearance] : normal appearance [General Appearance - Well Nourished] : well nourished [Normal Conjunctiva] : the conjunctiva exhibited no abnormalities [Normal Oral Mucosa] : normal oral mucosa [JVD Elevated _____cm] : JVD elevated [unfilled] ~U cm above clavicle [] : no respiratory distress [Respiration, Rhythm And Depth] : normal respiratory rhythm and effort [Auscultation Breath Sounds / Voice Sounds] : lungs were clear to auscultation bilaterally [Heart Rate And Rhythm] : heart rate and rhythm were normal [Bowel Sounds] : normal bowel sounds [Abdomen Soft] : soft [Abdomen Tenderness] : non-tender [Abnormal Walk] : normal gait [Nail Clubbing] : no clubbing of the fingernails [Cyanosis, Localized] : no localized cyanosis [Skin Color & Pigmentation] : normal skin color and pigmentation [Oriented To Time, Place, And Person] : oriented to person, place, and time [No Anxiety] : not feeling anxious [FreeTextEntry1] : some distention noted

## 2020-03-05 NOTE — DISCUSSION/SUMMARY
[Patient] : the patient [___ Month(s)] : [unfilled] month(s) [FreeTextEntry3] : LVAD clinic [FreeTextEntry1] : - LVAD: continue speed at 8800. Appears to be pulsatilie. Repeat an Echo at next clinic visit. He may be a candidate for LVAD turndown evaluation. \par - Congestive heart failure: Continue Lisinopril 20 mg twice/day and Metoprolol at currently doses. Will add furosemide 20 mg daily x 3 days and reassess on Monday.\par - Coumadin: Monitor INR closely and adjust warfarin accordingly, INR goal is 2.0-2.5. If needs to be adjusted prior to cataract surgery, please inform us and we can adjust accordingly\par - Antiplatelets: Continue aspirin at 81mg every day. If needs to be held for 3-5 days prior to cataract surgery then can be held. \par - PAD: Ongoing treatment by Dr. Lovelace. Since improved, no indication for revascularization\par - CKD stage III with hyperkalemia; improved from prior \par - Education of LVAD system maintenance was reviewed with patient.\par - Supplies: Gary

## 2020-03-10 ENCOUNTER — NON-APPOINTMENT (OUTPATIENT)
Age: 65
End: 2020-03-10

## 2020-03-15 ENCOUNTER — RX RENEWAL (OUTPATIENT)
Age: 65
End: 2020-03-15

## 2020-03-31 ENCOUNTER — INPATIENT (INPATIENT)
Facility: HOSPITAL | Age: 65
LOS: 19 days | Discharge: HOME | DRG: 871 | End: 2020-04-20
Attending: THORACIC SURGERY (CARDIOTHORACIC VASCULAR SURGERY) | Admitting: THORACIC SURGERY (CARDIOTHORACIC VASCULAR SURGERY)
Payer: MEDICARE

## 2020-03-31 ENCOUNTER — NON-APPOINTMENT (OUTPATIENT)
Age: 65
End: 2020-03-31

## 2020-03-31 VITALS
HEART RATE: 93 BPM | WEIGHT: 115.08 LBS | HEIGHT: 70 IN | OXYGEN SATURATION: 96 % | RESPIRATION RATE: 18 BRPM | TEMPERATURE: 98 F

## 2020-03-31 DIAGNOSIS — Z95.811 PRESENCE OF HEART ASSIST DEVICE: ICD-10-CM

## 2020-03-31 DIAGNOSIS — R06.02 SHORTNESS OF BREATH: ICD-10-CM

## 2020-03-31 DIAGNOSIS — Z98.890 OTHER SPECIFIED POSTPROCEDURAL STATES: Chronic | ICD-10-CM

## 2020-03-31 DIAGNOSIS — B34.2 CORONAVIRUS INFECTION, UNSPECIFIED: ICD-10-CM

## 2020-03-31 DIAGNOSIS — R11.0 NAUSEA: ICD-10-CM

## 2020-03-31 DIAGNOSIS — Z95.811 PRESENCE OF HEART ASSIST DEVICE: Chronic | ICD-10-CM

## 2020-03-31 LAB
ALBUMIN SERPL ELPH-MCNC: 3.6 G/DL — SIGNIFICANT CHANGE UP (ref 3.3–5)
ALBUMIN SERPL ELPH-MCNC: 4.5 G/DL — SIGNIFICANT CHANGE UP (ref 3.3–5)
ALBUMIN SERPL ELPH-MCNC: 4.5 G/DL — SIGNIFICANT CHANGE UP (ref 3.3–5)
ALP SERPL-CCNC: 58 U/L — SIGNIFICANT CHANGE UP (ref 40–120)
ALP SERPL-CCNC: 69 U/L — SIGNIFICANT CHANGE UP (ref 40–120)
ALP SERPL-CCNC: 74 U/L — SIGNIFICANT CHANGE UP (ref 40–120)
ALT FLD-CCNC: 10 U/L — SIGNIFICANT CHANGE UP (ref 10–45)
ALT FLD-CCNC: 14 U/L — SIGNIFICANT CHANGE UP (ref 10–45)
ALT FLD-CCNC: 17 U/L — SIGNIFICANT CHANGE UP (ref 10–45)
ANION GAP SERPL CALC-SCNC: 16 MMOL/L — SIGNIFICANT CHANGE UP (ref 5–17)
ANION GAP SERPL CALC-SCNC: 18 MMOL/L — HIGH (ref 5–17)
ANION GAP SERPL CALC-SCNC: 19 MMOL/L — HIGH (ref 5–17)
APTT BLD: 40.8 SEC — HIGH (ref 27.5–36.3)
APTT BLD: 49.6 SEC — HIGH (ref 27.5–36.3)
AST SERPL-CCNC: 34 U/L — SIGNIFICANT CHANGE UP (ref 10–40)
AST SERPL-CCNC: 36 U/L — SIGNIFICANT CHANGE UP (ref 10–40)
AST SERPL-CCNC: 46 U/L — HIGH (ref 10–40)
BASE EXCESS BLDV CALC-SCNC: -3.9 MMOL/L — LOW (ref -2–2)
BASE EXCESS BLDV CALC-SCNC: -4.4 MMOL/L — LOW (ref -2–2)
BASOPHILS # BLD AUTO: 0.01 K/UL — SIGNIFICANT CHANGE UP (ref 0–0.2)
BASOPHILS NFR BLD AUTO: 0.2 % — SIGNIFICANT CHANGE UP (ref 0–2)
BILIRUB SERPL-MCNC: 0.2 MG/DL — SIGNIFICANT CHANGE UP (ref 0.2–1.2)
BILIRUB SERPL-MCNC: 0.3 MG/DL — SIGNIFICANT CHANGE UP (ref 0.2–1.2)
BILIRUB SERPL-MCNC: 0.3 MG/DL — SIGNIFICANT CHANGE UP (ref 0.2–1.2)
BUN SERPL-MCNC: 45 MG/DL — HIGH (ref 7–23)
BUN SERPL-MCNC: 48 MG/DL — HIGH (ref 7–23)
BUN SERPL-MCNC: 48 MG/DL — HIGH (ref 7–23)
CA-I SERPL-SCNC: 1.08 MMOL/L — LOW (ref 1.12–1.3)
CA-I SERPL-SCNC: 1.13 MMOL/L — SIGNIFICANT CHANGE UP (ref 1.12–1.3)
CALCIUM SERPL-MCNC: 8.3 MG/DL — LOW (ref 8.4–10.5)
CALCIUM SERPL-MCNC: 9.2 MG/DL — SIGNIFICANT CHANGE UP (ref 8.4–10.5)
CALCIUM SERPL-MCNC: 9.2 MG/DL — SIGNIFICANT CHANGE UP (ref 8.4–10.5)
CHLORIDE BLDV-SCNC: 102 MMOL/L — SIGNIFICANT CHANGE UP (ref 96–108)
CHLORIDE BLDV-SCNC: 103 MMOL/L — SIGNIFICANT CHANGE UP (ref 96–108)
CHLORIDE SERPL-SCNC: 100 MMOL/L — SIGNIFICANT CHANGE UP (ref 96–108)
CHLORIDE SERPL-SCNC: 92 MMOL/L — LOW (ref 96–108)
CHLORIDE SERPL-SCNC: 94 MMOL/L — LOW (ref 96–108)
CK MB BLD-MCNC: 3.8 % — HIGH (ref 0–3.5)
CK MB BLD-MCNC: 4.7 % — HIGH (ref 0–3.5)
CK MB CFR SERPL CALC: 12.2 NG/ML — HIGH (ref 0–6.7)
CK MB CFR SERPL CALC: 15.7 NG/ML — HIGH (ref 0–6.7)
CK SERPL-CCNC: 318 U/L — HIGH (ref 30–200)
CK SERPL-CCNC: 336 U/L — HIGH (ref 30–200)
CO2 BLDV-SCNC: 24 MMOL/L — SIGNIFICANT CHANGE UP (ref 22–30)
CO2 BLDV-SCNC: 25 MMOL/L — SIGNIFICANT CHANGE UP (ref 22–30)
CO2 SERPL-SCNC: 17 MMOL/L — LOW (ref 22–31)
CO2 SERPL-SCNC: 18 MMOL/L — LOW (ref 22–31)
CO2 SERPL-SCNC: 19 MMOL/L — LOW (ref 22–31)
CREAT ?TM UR-MCNC: 328 MG/DL — SIGNIFICANT CHANGE UP
CREAT SERPL-MCNC: 3.5 MG/DL — HIGH (ref 0.5–1.3)
CREAT SERPL-MCNC: 4.2 MG/DL — HIGH (ref 0.5–1.3)
CREAT SERPL-MCNC: 4.21 MG/DL — HIGH (ref 0.5–1.3)
CRP SERPL-MCNC: 5.15 MG/DL — HIGH (ref 0–0.4)
EOSINOPHIL # BLD AUTO: 0 K/UL — SIGNIFICANT CHANGE UP (ref 0–0.5)
EOSINOPHIL NFR BLD AUTO: 0 % — SIGNIFICANT CHANGE UP (ref 0–6)
ERYTHROCYTE [SEDIMENTATION RATE] IN BLOOD: 87 MM/HR — HIGH (ref 0–20)
GAS PNL BLDA: SIGNIFICANT CHANGE UP
GAS PNL BLDV: 128 MMOL/L — LOW (ref 135–145)
GAS PNL BLDV: 129 MMOL/L — LOW (ref 135–145)
GAS PNL BLDV: SIGNIFICANT CHANGE UP
GLUCOSE BLDV-MCNC: 112 MG/DL — HIGH (ref 70–99)
GLUCOSE BLDV-MCNC: 126 MG/DL — HIGH (ref 70–99)
GLUCOSE SERPL-MCNC: 112 MG/DL — HIGH (ref 70–99)
GLUCOSE SERPL-MCNC: 122 MG/DL — HIGH (ref 70–99)
GLUCOSE SERPL-MCNC: 79 MG/DL — SIGNIFICANT CHANGE UP (ref 70–99)
HCO3 BLDV-SCNC: 22 MMOL/L — SIGNIFICANT CHANGE UP (ref 21–29)
HCO3 BLDV-SCNC: 24 MMOL/L — SIGNIFICANT CHANGE UP (ref 21–29)
HCT VFR BLD CALC: 45.8 % — SIGNIFICANT CHANGE UP (ref 39–50)
HCT VFR BLDA CALC: 46 % — SIGNIFICANT CHANGE UP (ref 39–50)
HCT VFR BLDA CALC: 47 % — SIGNIFICANT CHANGE UP (ref 39–50)
HGB BLD CALC-MCNC: 15.2 G/DL — SIGNIFICANT CHANGE UP (ref 13–17)
HGB BLD CALC-MCNC: 15.4 G/DL — SIGNIFICANT CHANGE UP (ref 13–17)
HGB BLD-MCNC: 14.4 G/DL — SIGNIFICANT CHANGE UP (ref 13–17)
IMM GRANULOCYTES NFR BLD AUTO: 0.2 % — SIGNIFICANT CHANGE UP (ref 0–1.5)
INR BLD: 3.52 RATIO — HIGH (ref 0.88–1.16)
INR BLD: 3.54 RATIO — HIGH (ref 0.88–1.16)
LACTATE BLDV-MCNC: 2.3 MMOL/L — HIGH (ref 0.7–2)
LACTATE BLDV-MCNC: 2.3 MMOL/L — HIGH (ref 0.7–2)
LDH SERPL L TO P-CCNC: 337 U/L — HIGH (ref 50–242)
LDH SERPL L TO P-CCNC: 612 U/L — HIGH (ref 50–242)
LIDOCAIN IGE QN: 41 U/L — SIGNIFICANT CHANGE UP (ref 7–60)
LYMPHOCYTES # BLD AUTO: 0.63 K/UL — LOW (ref 1–3.3)
LYMPHOCYTES # BLD AUTO: 14.7 % — SIGNIFICANT CHANGE UP (ref 13–44)
MCHC RBC-ENTMCNC: 28.5 PG — SIGNIFICANT CHANGE UP (ref 27–34)
MCHC RBC-ENTMCNC: 31.4 GM/DL — LOW (ref 32–36)
MCV RBC AUTO: 90.5 FL — SIGNIFICANT CHANGE UP (ref 80–100)
MONOCYTES # BLD AUTO: 0.63 K/UL — SIGNIFICANT CHANGE UP (ref 0–0.9)
MONOCYTES NFR BLD AUTO: 14.7 % — HIGH (ref 2–14)
NEUTROPHILS # BLD AUTO: 3.02 K/UL — SIGNIFICANT CHANGE UP (ref 1.8–7.4)
NEUTROPHILS NFR BLD AUTO: 70.2 % — SIGNIFICANT CHANGE UP (ref 43–77)
NRBC # BLD: 0 /100 WBCS — SIGNIFICANT CHANGE UP (ref 0–0)
OTHER CELLS CSF MANUAL: 9 ML/DL — LOW (ref 18–22)
PCO2 BLDV: 47 MMHG — SIGNIFICANT CHANGE UP (ref 35–50)
PCO2 BLDV: 56 MMHG — HIGH (ref 35–50)
PH BLDV: 7.25 — LOW (ref 7.35–7.45)
PH BLDV: 7.3 — LOW (ref 7.35–7.45)
PLATELET # BLD AUTO: 112 K/UL — LOW (ref 150–400)
PO2 BLDV: 24 MMHG — LOW (ref 25–45)
PO2 BLDV: 29 MMHG — SIGNIFICANT CHANGE UP (ref 25–45)
POTASSIUM BLDV-SCNC: 6 MMOL/L — HIGH (ref 3.5–5.3)
POTASSIUM BLDV-SCNC: 8.3 MMOL/L — CRITICAL HIGH (ref 3.5–5.3)
POTASSIUM SERPL-MCNC: 4.9 MMOL/L — SIGNIFICANT CHANGE UP (ref 3.5–5.3)
POTASSIUM SERPL-MCNC: 6.3 MMOL/L — CRITICAL HIGH (ref 3.5–5.3)
POTASSIUM SERPL-MCNC: 7.3 MMOL/L — CRITICAL HIGH (ref 3.5–5.3)
POTASSIUM SERPL-SCNC: 4.9 MMOL/L — SIGNIFICANT CHANGE UP (ref 3.5–5.3)
POTASSIUM SERPL-SCNC: 6.3 MMOL/L — CRITICAL HIGH (ref 3.5–5.3)
POTASSIUM SERPL-SCNC: 7.3 MMOL/L — CRITICAL HIGH (ref 3.5–5.3)
PROCALCITONIN SERPL-MCNC: 0.45 NG/ML — HIGH (ref 0.02–0.1)
PROT SERPL-MCNC: 7.7 G/DL — SIGNIFICANT CHANGE UP (ref 6–8.3)
PROT SERPL-MCNC: 9.2 G/DL — HIGH (ref 6–8.3)
PROT SERPL-MCNC: 9.5 G/DL — HIGH (ref 6–8.3)
PROTHROM AB SERPL-ACNC: 41.7 SEC — HIGH (ref 10–12.9)
PROTHROM AB SERPL-ACNC: 42 SEC — HIGH (ref 10–12.9)
RBC # BLD: 5.06 M/UL — SIGNIFICANT CHANGE UP (ref 4.2–5.8)
RBC # FLD: 14.1 % — SIGNIFICANT CHANGE UP (ref 10.3–14.5)
SAO2 % BLDV: 27 % — LOW (ref 67–88)
SAO2 % BLDV: 41 % — LOW (ref 67–88)
SODIUM SERPL-SCNC: 129 MMOL/L — LOW (ref 135–145)
SODIUM SERPL-SCNC: 131 MMOL/L — LOW (ref 135–145)
SODIUM SERPL-SCNC: 133 MMOL/L — LOW (ref 135–145)
SODIUM UR-SCNC: 67 MMOL/L — SIGNIFICANT CHANGE UP
TROPONIN T, HIGH SENSITIVITY RESULT: 568 NG/L — HIGH (ref 0–51)
TROPONIN T, HIGH SENSITIVITY RESULT: 683 NG/L — HIGH (ref 0–51)
WBC # BLD: 4.3 K/UL — SIGNIFICANT CHANGE UP (ref 3.8–10.5)
WBC # FLD AUTO: 4.3 K/UL — SIGNIFICANT CHANGE UP (ref 3.8–10.5)

## 2020-03-31 PROCEDURE — 74176 CT ABD & PELVIS W/O CONTRAST: CPT | Mod: 26

## 2020-03-31 PROCEDURE — 99291 CRITICAL CARE FIRST HOUR: CPT

## 2020-03-31 PROCEDURE — 99223 1ST HOSP IP/OBS HIGH 75: CPT | Mod: GC

## 2020-03-31 PROCEDURE — 93308 TTE F-UP OR LMTD: CPT | Mod: 26

## 2020-03-31 PROCEDURE — 71045 X-RAY EXAM CHEST 1 VIEW: CPT | Mod: 26

## 2020-03-31 PROCEDURE — 71250 CT THORAX DX C-: CPT | Mod: 26

## 2020-03-31 RX ORDER — METRONIDAZOLE 500 MG
500 TABLET ORAL ONCE
Refills: 0 | Status: COMPLETED | OUTPATIENT
Start: 2020-03-31 | End: 2020-03-31

## 2020-03-31 RX ORDER — MIRTAZAPINE 45 MG/1
7.5 TABLET, ORALLY DISINTEGRATING ORAL AT BEDTIME
Refills: 0 | Status: DISCONTINUED | OUTPATIENT
Start: 2020-03-31 | End: 2020-04-20

## 2020-03-31 RX ORDER — SODIUM CHLORIDE 9 MG/ML
250 INJECTION, SOLUTION INTRAVENOUS ONCE
Refills: 0 | Status: COMPLETED | OUTPATIENT
Start: 2020-03-31 | End: 2020-03-31

## 2020-03-31 RX ORDER — METRONIDAZOLE 500 MG
500 TABLET ORAL EVERY 12 HOURS
Refills: 0 | Status: DISCONTINUED | OUTPATIENT
Start: 2020-04-01 | End: 2020-04-02

## 2020-03-31 RX ORDER — SODIUM CHLORIDE 9 MG/ML
3 INJECTION INTRAMUSCULAR; INTRAVENOUS; SUBCUTANEOUS EVERY 8 HOURS
Refills: 0 | Status: DISCONTINUED | OUTPATIENT
Start: 2020-03-31 | End: 2020-04-20

## 2020-03-31 RX ORDER — INSULIN HUMAN 100 [IU]/ML
7 INJECTION, SOLUTION SUBCUTANEOUS ONCE
Refills: 0 | Status: COMPLETED | OUTPATIENT
Start: 2020-03-31 | End: 2020-03-31

## 2020-03-31 RX ORDER — SODIUM ZIRCONIUM CYCLOSILICATE 10 G/10G
10 POWDER, FOR SUSPENSION ORAL ONCE
Refills: 0 | Status: COMPLETED | OUTPATIENT
Start: 2020-03-31 | End: 2020-03-31

## 2020-03-31 RX ORDER — TAMSULOSIN HYDROCHLORIDE 0.4 MG/1
0.4 CAPSULE ORAL AT BEDTIME
Refills: 0 | Status: DISCONTINUED | OUTPATIENT
Start: 2020-03-31 | End: 2020-04-01

## 2020-03-31 RX ORDER — CEFEPIME 1 G/1
2000 INJECTION, POWDER, FOR SOLUTION INTRAMUSCULAR; INTRAVENOUS ONCE
Refills: 0 | Status: COMPLETED | OUTPATIENT
Start: 2020-03-31 | End: 2020-03-31

## 2020-03-31 RX ORDER — OXYBUTYNIN CHLORIDE 5 MG
5 TABLET ORAL
Refills: 0 | Status: DISCONTINUED | OUTPATIENT
Start: 2020-03-31 | End: 2020-04-01

## 2020-03-31 RX ORDER — ACETAMINOPHEN 500 MG
650 TABLET ORAL EVERY 6 HOURS
Refills: 0 | Status: DISCONTINUED | OUTPATIENT
Start: 2020-03-31 | End: 2020-04-20

## 2020-03-31 RX ORDER — METOPROLOL TARTRATE 50 MG
200 TABLET ORAL DAILY
Refills: 0 | Status: DISCONTINUED | OUTPATIENT
Start: 2020-03-31 | End: 2020-04-01

## 2020-03-31 RX ORDER — METRONIDAZOLE 500 MG
TABLET ORAL
Refills: 0 | Status: DISCONTINUED | OUTPATIENT
Start: 2020-03-31 | End: 2020-04-02

## 2020-03-31 RX ORDER — FAMOTIDINE 10 MG/ML
20 INJECTION INTRAVENOUS DAILY
Refills: 0 | Status: DISCONTINUED | OUTPATIENT
Start: 2020-03-31 | End: 2020-04-20

## 2020-03-31 RX ORDER — FINASTERIDE 5 MG/1
5 TABLET, FILM COATED ORAL DAILY
Refills: 0 | Status: DISCONTINUED | OUTPATIENT
Start: 2020-03-31 | End: 2020-04-01

## 2020-03-31 RX ORDER — DEXTROSE 50 % IN WATER 50 %
50 SYRINGE (ML) INTRAVENOUS ONCE
Refills: 0 | Status: COMPLETED | OUTPATIENT
Start: 2020-03-31 | End: 2020-03-31

## 2020-03-31 RX ORDER — CEFEPIME 1 G/1
INJECTION, POWDER, FOR SOLUTION INTRAMUSCULAR; INTRAVENOUS
Refills: 0 | Status: DISCONTINUED | OUTPATIENT
Start: 2020-03-31 | End: 2020-04-02

## 2020-03-31 RX ORDER — ASPIRIN/CALCIUM CARB/MAGNESIUM 324 MG
81 TABLET ORAL DAILY
Refills: 0 | Status: DISCONTINUED | OUTPATIENT
Start: 2020-03-31 | End: 2020-04-20

## 2020-03-31 RX ORDER — LISINOPRIL 2.5 MG/1
20 TABLET ORAL DAILY
Refills: 0 | Status: DISCONTINUED | OUTPATIENT
Start: 2020-03-31 | End: 2020-03-31

## 2020-03-31 RX ORDER — CEFEPIME 1 G/1
2000 INJECTION, POWDER, FOR SOLUTION INTRAMUSCULAR; INTRAVENOUS EVERY 24 HOURS
Refills: 0 | Status: DISCONTINUED | OUTPATIENT
Start: 2020-04-01 | End: 2020-04-02

## 2020-03-31 RX ADMIN — Medication 50 MILLILITER(S): at 21:30

## 2020-03-31 RX ADMIN — Medication 5 MILLIGRAM(S): at 19:22

## 2020-03-31 RX ADMIN — SODIUM ZIRCONIUM CYCLOSILICATE 10 GRAM(S): 10 POWDER, FOR SUSPENSION ORAL at 22:32

## 2020-03-31 RX ADMIN — CEFEPIME 100 MILLIGRAM(S): 1 INJECTION, POWDER, FOR SOLUTION INTRAMUSCULAR; INTRAVENOUS at 22:49

## 2020-03-31 RX ADMIN — INSULIN HUMAN 7 UNIT(S): 100 INJECTION, SOLUTION SUBCUTANEOUS at 21:29

## 2020-03-31 RX ADMIN — SODIUM CHLORIDE 250 MILLILITER(S): 9 INJECTION, SOLUTION INTRAVENOUS at 23:00

## 2020-03-31 RX ADMIN — SODIUM CHLORIDE 250 MILLILITER(S): 9 INJECTION, SOLUTION INTRAVENOUS at 21:31

## 2020-03-31 RX ADMIN — CEFEPIME 100 MILLIGRAM(S): 1 INJECTION, POWDER, FOR SOLUTION INTRAMUSCULAR; INTRAVENOUS at 17:50

## 2020-03-31 RX ADMIN — Medication 100 MILLIGRAM(S): at 21:30

## 2020-03-31 NOTE — H&P ADULT - ASSESSMENT
This  a 63 y/o male PMH ACC/AHA stage D HF due to NICM HM2 LVAD , TV annuloplasty ring 9/12/17 as destination therapy due to severe peripheral artery disease with significant stenosis  SIADH, Depression, CKD-3, past E. coli UTIs.   Generally in good health able to maintain ADLSs euvolemic and normotensive.  Today  reports generalized weakness since 4 days, chills since 3 days, non-specific abdominal pain and constipation since 2 days with  loose stools today.  Reports sick contacts with Covid-19 at home. Denies  cough, difficulty breathing, chest pain, palpitations. Directed to [facility] ED r/o COVID  admitted to Dr Shipley. Seen by ID Blood cultures, COVID 19 PCR pending- started on empiric cefepime and flagyl.  Repeat COMP   Concern for COVID pending results for disposition. This  a 65 y/o male PMH ACC/AHA stage D HF due to NICM HM2 LVAD , TV annuloplasty ring 9/12/17 as destination therapy due to severe peripheral artery disease with significant stenosis  SIADH, Depression, CKD-3, with hyperkalemia past E. coli UTIs.   Generally in good health able to maintain ADLSs euvolemic and normotensive.  Today  reports generalized weakness since 4 days, chills since 3 days, non-specific abdominal pain and constipation since 2 days with  loose stools today.  Reports sick contacts with Covid-19 at home. Denies  cough, difficulty breathing, chest pain, palpitations. Directed to [facility] ED r/o COVID  admitted to Dr Shipley. Seen by ID Blood cultures, COVID 19 PCR pending- started on empiric cefepime and flagyl.  Repeat COMP   Concern for COVID pending results for disposition.

## 2020-03-31 NOTE — ED ADULT NURSE NOTE - OBJECTIVE STATEMENT
pt is here for sore throat.  pt stated that abdominal pain since Saturday, diarrhea since today, c/o back pain 4-6/10, denied cough or fever, LVAD patient, no distress noted at this time.

## 2020-03-31 NOTE — H&P ADULT - PROBLEM SELECTOR PLAN 2
Admit to Dr Helio CHARLTON  Coumadin dosed by INR daily   CBC BMP LDH PTT INR daily  Followed LVAD team

## 2020-03-31 NOTE — H&P ADULT - NSHPLABSRESULTS_GEN_ALL_CORE
14.4   4.30  )-----------( 112      ( 31 Mar 2020 16:49 )             45.8   03-31    131<L>  |  94<L>  |  45<H>  ----------------------------<  122<H>  7.3<HH>   |  19<L>  |  4.21<H>    Ca    9.2      31 Mar 2020 16:49    TPro  9.2<H>  /  Alb  4.5  /  TBili  0.3  /  DBili  x   /  AST  46<H>  /  ALT  17  /  AlkPhos  69  03-31  PT/INR - ( 31 Mar 2020 16:49 )   PT: 42.0 sec;   INR: 3.54 ratio         PTT - ( 31 Mar 2020 16:49 )  PTT:49.6 se  < from: CT Chest No Cont (03.31.20 @ 17:17) >    Chest CT:  New nonspecific groundglass opacities in the right upper lobe. CT findings of pulmonary infection/inflammation compatible with many entities (C19V-2).  If COVID-19 is suspected, correlation with RT-PCR is recommended.       CT abdomen and pelvis:  No acute intra-abdominal pathology on this noncontrast exam.      < from: Xray Chest 1 View AP/PA (03.31.20 @ 17:27) >    IMPRESSION:   Subtle linear opacities at the right mid lung field correlate to right upper lobe groundglass opacities better characterized on the same day chest CT.    < end of copied text >

## 2020-03-31 NOTE — CONSULT NOTE ADULT - ATTENDING COMMENTS
Patient seen and examined with DR. Hernandez    I agree with his history exam and plans as noted above.  Patient with a history of LVAD, history of E.coli UTI admission in the past, readmitted with complaints of a few days of early weakness and chills, reports a sick contact at home with COVID-19, no fevers, no cough no SOB, not wearing supplemental oxygen  Today began developing loose stools after having constipation, anorexia and abdominal pain for the past few days.    Exam was suspicious for an intra-abdominal source but CT of chest is more consistent with COVID-19 infection  nasal swab sent and pending  Agree with blood cultures x 2 sets  COVID testing  Cefepime and flagyl pending above results  GI-PCR with next BM    Admission location will be determined based upon COVID test results    Morris Prater MD  [phone]  After 5pm/weekends [phone]

## 2020-03-31 NOTE — CONSULT NOTE ADULT - ASSESSMENT
ASSESSMENT:  64/M with PMH CHF s/p LVAD Sept 2017 (on Warfarin), CAD, s/p TVR, SIADH, Depression, CKD-3, past E. coli UTIs  P/w generalized weakness since 4 days; chills since 3 days; non-specific abdominal pain and constipation since 2 days and loose stools today  H/o sick contacts with Covid-19 at home. Imaging concerning for new opacities in RUL, Covid-19 PCR pending.  =========  Concern for Covid-19 infection  - pt denied cough, upper resp symptoms, fevers. Appears comfortable on RA, able to speak full sentences without dyspnea  - However, he has sick contacts with Covid-19 at home and imaging findings of RUL opacities  - would continue isolation precautions pending Covid-19 infection  - Covid-19 Labs - ; Procalcitonin 0.45    RECOMMENDATIONS:  - continue Cefepime  - start Flagyl 500mg IV q12h  - would check repeat BMP (hemolyzed sample, unclear if BUN/Cr is falsely elevated - this will change antibiotic dosing)  - check CRP, Ferritin, LDH, d-dimer  - f/u blood cx, UA, urine Cx, GI PCR, Covid-19 PCR  - continue Contact and Airborne isolation precautions    ABELARDO Forbes, MD  Fellow, Infectious Diseases  Pager: [phone]  After 5pm and on Weekends: Call [phone]

## 2020-03-31 NOTE — H&P ADULT - NSICDXPASTMEDICALHX_GEN_ALL_CORE_FT
PAST MEDICAL HISTORY:  CAD (coronary artery disease)     CHF (congestive heart failure)     Depression     Pleural effusion

## 2020-03-31 NOTE — ED ADULT NURSE REASSESSMENT NOTE - NS ED NURSE REASSESS COMMENT FT1
LVAD team contacted at 1352, team will evaluate patient in triage, pt stable, vitals stable, no pain, no distress

## 2020-03-31 NOTE — ED PROVIDER NOTE - OBJECTIVE STATEMENT
Attending Rose Mary Rosa: 65 y/o male s/p LVAD placement with h/o CHF presenting with abdominal pain and fever. pt does have sick contacts with covid at home. pt states has been having intermittent abdominal pain. Attending Rose Mary Rosa: 63 y/o male s/p LVAD placement with h/o CHF presenting with abdominal pain and fever. pt does have sick contacts with covid at home. pt states has been having intermittent abdominal pain associated with some loosestools. no vomiting. does state his abdomen sometimes feel swollen. no difficulty urinating.

## 2020-03-31 NOTE — ED PROVIDER NOTE - CARE PLAN
Principal Discharge DX:	SOB (shortness of breath) Principal Discharge DX:	SOB (shortness of breath)  Secondary Diagnosis:	Hyperkalemia  Secondary Diagnosis:	Acute renal failure, unspecified acute renal failure type

## 2020-03-31 NOTE — H&P ADULT - HISTORY OF PRESENT ILLNESS
This  a 65 y/o male PMH ACC/AHA stage D HF due to NICM HM2 LVAD , TV annuloplasty ring 9/12/17 as destination therapy due to severe peripheral artery disease with significant stenosis  SIADH, Depression, CKD-3, past E. coli UTIs.   Generally in good health able to maintain ADLSs euvolemic and normotensive.  Today  reports generalized weakness since 4 days, chills since 3 days, non-specific abdominal pain and constipation since 2 days with  loose stools today.  Reports sick contacts with Covid-19 at home. Denies  cough, difficulty breathing, chest pain, palpitations. Directed to [facility] ED r/o COVID  admitted to Dr Shipley This  a 63 y/o male PMH ACC/AHA stage D HF due to NICM HM2 LVAD , TV annuloplasty ring 9/12/17 as destination therapy due to severe peripheral artery disease with significant stenosis  SIADH, Depression, CKD-3 with hyperkalemia, past E. coli UTIs.   Generally in good health able to maintain ADLSs euvolemic and normotensive.  Today  reports generalized weakness since 4 days, chills since 3 days, non-specific abdominal pain and constipation since 2 days with  loose stools today.  Reports sick contacts with Covid-19 at home. Denies  cough, difficulty breathing, chest pain, palpitations. Directed to [facility] ED r/o COVID  admitted to Dr Shipley

## 2020-03-31 NOTE — ED ADULT NURSE NOTE - CHIEF COMPLAINT QUOTE
pt poor historian, states "I'm not feeling well"  sore throat since Saturday  LVAD patient, no distress

## 2020-03-31 NOTE — CONSULT NOTE ADULT - SUBJECTIVE AND OBJECTIVE BOX
Patient is a 64y old  Male who presents with a chief complaint of   HPI:  - 64/M with PMH CHF s/p LVAD Sept 2017 (on Warfarin), CAD, s/p TVR, SIADH, Depression, CKD-3, past E. coli UTIs  - reports generalized weakness since 4 days, chills since 3 days, non-specific abdominal pain and constipation since 2 days and loose stools today  - denied cough, difficulty breathing, chest pain, palpitations  - h/o sick contacts with Covid-19 at home     prior hospital charts reviewed [x]  primary team notes reviewed [x]    PAST MEDICAL & SURGICAL HISTORY:  Pleural effusion  Depression  CAD (coronary artery disease)  CHF (congestive heart failure)  S/P ventricular assist device  S/P TVR (tricuspid valve repair)    Allergies  Allergy Status Unknown    ANTIMICROBIALS (past 90 days)  MEDICATIONS  (STANDING):  cefepime   IVPB   100 mL/Hr IV Intermittent (03-31-20 @ 17:50)      ANTIMICROBIALS:      OTHER MEDS: MEDICATIONS  (STANDING):  acetaminophen   Tablet .. 650 every 6 hours PRN  aspirin enteric coated 81 daily  famotidine    Tablet 20 daily  finasteride 5 daily  lisinopril 20 daily  metoprolol succinate  daily  mirtazapine 7.5 at bedtime  oxybutynin 5 two times a day  tamsulosin 0.4 at bedtime    SOCIAL HISTORY:  Lives with family and children    FAMILY HISTORY:  No pertinent family history in first degree relatives    REVIEW OF SYSTEMS  [  ] ROS unobtainable because:    [x] All other systems negative except as noted below:	  Constitutional:  [ ] fever [x] chills  [ ] weight loss  [ ] weakness  Skin:  [ ] rash [ ] phlebitis	  Eyes: [ ] icterus [ ] pain  [ ] discharge	  ENMT: [ ] sore throat  [ ] thrush [ ] ulcers [ ] exudates  Respiratory: [ ] dyspnea [ ] hemoptysis [ ] cough [ ] sputum	  Cardiovascular:  [ ] chest pain [ ] palpitations [ ] edema	  Gastrointestinal:  [ ] nausea [ ] vomiting [x] diarrhea [x] constipation [x] pain	  Genitourinary:  [ ] dysuria [ ] frequency [ ] hematuria [ ] discharge [ ] flank pain  [ ] incontinence  Musculoskeletal:  [ ] myalgias [ ] arthralgias [ ] arthritis  [ ] back pain  Neurological:  [ ] headache [ ] seizures  [ ] confusion/altered mental status  Psychiatric:  [ ] anxiety [ ] depression	  Hematology/Lymphatics:  [ ] lymphadenopathy  Endocrine:  [ ] adrenal [ ] thyroid  Allergic/Immunologic:	 [ ] transplant [ ] seasonal    Vital Signs Last 24 Hrs  T(F): 99 (03-31-20 @ 17:49), Max: 99 (03-31-20 @ 17:49)  Vital Signs Last 24 Hrs  HR: 102 (03-31-20 @ 17:49) (93 - 102)  BP: --  RR: 18 (03-31-20 @ 17:49)  SpO2: 96% (03-31-20 @ 13:04) (96% - 96%)  Wt(kg): --    EXAM:  Constitutional: Not in acute distress  Eyes: pupils bilaterally reactive to light. No icterus.  Oral cavity: Clear, no lesions  Neck: No neck vein distension noted  RS and CVS could not be performed  Abdomen: Soft. No guarding/rigidity/tenderness. No tenderness around LVAD site  : No acute abnormalities  Extremities: Warm. No pedal edema  Skin: No lesions noted  Vascular: No evidence of phlebitis  Neuro: Alert, oriented to time/place/person                          14.4   4.30  )-----------( 112      ( 31 Mar 2020 16:49 )             45.8     03-31    x   |  x   |  x   ----------------------------<  x   7.3<HH>   |  x   |  x         RADIOLOGY:  imaging below personally reviewed  < from: CT Abdomen and Pelvis No Cont (03.31.20 @ 17:17) >  IMPRESSION:   Chest CT:  New nonspecific groundglass opacities in the right upper lobe. CT findings of pulmonary infection/inflammation compatible with many entities (C19V-2).  If COVID-19 is suspected, correlation with RT-PCR is recommended.     CT abdomen and pelvis:  No acute intra-abdominal pathology on this noncontrast exam.  < end of copied text >    < from: Xray Chest 1 View AP/PA (03.31.20 @ 17:27) >  PRELIMINARY REPORT  INTERPRETATION:  New nonspecific groundglass opacities in the right upper lobe are better evaluated on same day chest CT  PRELIMINARY REPORT  < end of copied text >      OTHER TESTS:

## 2020-03-31 NOTE — ED PROVIDER NOTE - PHYSICAL EXAMINATION
Attending Rose Mary Rosa: Gen: NAD, heent: atrauamtic, eomi, perrla, dry mucous membanres, op pink, uvula midline, neck; nttp, no nuchal rigidity, chest: nttp, no crepitus, cv:humming sound, lungs: ctab, abd: soft, nontender, nondistended, no peritoneal signs, +BS, no guarding, ext: wwp, neg homans, skin: no rash, neuro: awake and alert, following commands, speech clear, sensation and strength intact, no focal deficits

## 2020-03-31 NOTE — ED PROVIDER NOTE - ATTENDING CONTRIBUTION TO CARE
Attending MD Rose Mary Rosa:   I personally have seen and examined this patient.  Physician assistant note reviewed and agree on plan of care and except where noted.  See HPI, PE, and MDM for details.

## 2020-03-31 NOTE — H&P ADULT - NSHPREVIEWOFSYSTEMS_GEN_ALL_CORE
[x] All other systems negative except as noted below:	  Constitutional:  [ ] fever [x] chills  [ ] weight loss  [ ] weakness  Skin:  [ ] rash [ ] phlebitis	  Eyes: [ ] icterus [ ] pain  [ ] discharge	  ENMT: [ ] sore throat  [ ] thrush [ ] ulcers [ ] exudates  Respiratory: [ ] dyspnea [ ] hemoptysis [ ] cough [ ] sputum	  Cardiovascular: 	LVAD  driveline   Gastrointestinal:  [ ] nausea [ ] vomiting [x] diarrhea [x] constipation [x] pain	  Genitourinary:  [ ] dysuria [ ] frequency [ ] hematuria [ ] discharge [ ] flank pain  [ ] incontinence  Musculoskeletal:  [ ] myalgias [ ] arthralgias [ ] arthritis  [ ] back pain  Neurological:  [ ] headache [ ] seizures  [ ] confusion/altered mental status  Psychiatric:  [ ] anxiety [ ] depression	  Hematology/Lymphatics:  [ ] lymphadenopathy  Endocrine:  [ ] adrenal [ ] thyroid  Allergic/Immunologic:	 [ ] transplant [ ] seasonal

## 2020-03-31 NOTE — ED PROVIDER NOTE - PROGRESS NOTE DETAILS
Attending Rose Mary Rosa: d/w ct surgery upon arrival as well as LVAD team pt will need admission however need covid results prior to bed assignment Attending Rose Mary Rosa: repeat bloodwork shows evidence of renal failure and hyperkalemia. creatinine prior was within normal limits.d/w ct surgery who are aware will dw ct surgery lasix vs lokelma for hyperkalemia. pt on cardiac monitor. Attending Rose Mary Rosa: dw chf team request inslulin, glucose, aleman and lokelma , will aqwait results. MAP of 68

## 2020-03-31 NOTE — ED PROVIDER NOTE - CLINICAL SUMMARY MEDICAL DECISION MAKING FREE TEXT BOX
Attending Rose Mary Rosa: 64 y.o male s/p LVADin 2017 presenting with abdominal discomfort and diarrhea. upon arrival pt found to be tachypnic requiring supplemental oxygen. initial MAP of 65. d/w LVAD coordinator and ct surgery upon arrival and pt placed on supplemental oxygen. blood work showed sig RASHAWN and hyperkalemia. aleman placed. pt appears clinically dehydrated. pt on cardiac monitor. given small volume fluids insulin and dextrose. d/w heart failure as well. given lokelma. while in the ed pt's map declining. concenring for shock. d/w ct surgery will need ICU for close monitoring of respiraotry status and potassium levels. covid+.

## 2020-04-01 ENCOUNTER — RX RENEWAL (OUTPATIENT)
Age: 65
End: 2020-04-01

## 2020-04-01 DIAGNOSIS — I50.20 UNSPECIFIED SYSTOLIC (CONGESTIVE) HEART FAILURE: ICD-10-CM

## 2020-04-01 DIAGNOSIS — J12.9 VIRAL PNEUMONIA, UNSPECIFIED: ICD-10-CM

## 2020-04-01 DIAGNOSIS — E87.5 HYPERKALEMIA: ICD-10-CM

## 2020-04-01 DIAGNOSIS — N17.9 ACUTE KIDNEY FAILURE, UNSPECIFIED: ICD-10-CM

## 2020-04-01 LAB
ALBUMIN SERPL ELPH-MCNC: 3.8 G/DL — SIGNIFICANT CHANGE UP (ref 3.3–5)
ALP SERPL-CCNC: 59 U/L — SIGNIFICANT CHANGE UP (ref 40–120)
ALT FLD-CCNC: 14 U/L — SIGNIFICANT CHANGE UP (ref 10–45)
ANION GAP SERPL CALC-SCNC: 15 MMOL/L — SIGNIFICANT CHANGE UP (ref 5–17)
APPEARANCE UR: ABNORMAL
AST SERPL-CCNC: 34 U/L — SIGNIFICANT CHANGE UP (ref 10–40)
BACTERIA # UR AUTO: NEGATIVE — SIGNIFICANT CHANGE UP
BASOPHILS # BLD AUTO: 0.01 K/UL — SIGNIFICANT CHANGE UP (ref 0–0.2)
BASOPHILS NFR BLD AUTO: 0.2 % — SIGNIFICANT CHANGE UP (ref 0–2)
BILIRUB SERPL-MCNC: 0.2 MG/DL — SIGNIFICANT CHANGE UP (ref 0.2–1.2)
BILIRUB UR-MCNC: NEGATIVE — SIGNIFICANT CHANGE UP
BUN SERPL-MCNC: 52 MG/DL — HIGH (ref 7–23)
CALCIUM SERPL-MCNC: 8.4 MG/DL — SIGNIFICANT CHANGE UP (ref 8.4–10.5)
CHLORIDE SERPL-SCNC: 101 MMOL/L — SIGNIFICANT CHANGE UP (ref 96–108)
CO2 SERPL-SCNC: 16 MMOL/L — LOW (ref 22–31)
COLOR SPEC: YELLOW — SIGNIFICANT CHANGE UP
CREAT SERPL-MCNC: 2.28 MG/DL — HIGH (ref 0.5–1.3)
CRP SERPL-MCNC: 4.04 MG/DL — HIGH (ref 0–0.4)
CULTURE RESULTS: NO GROWTH — SIGNIFICANT CHANGE UP
DIFF PNL FLD: ABNORMAL
EOSINOPHIL # BLD AUTO: 0 K/UL — SIGNIFICANT CHANGE UP (ref 0–0.5)
EOSINOPHIL NFR BLD AUTO: 0 % — SIGNIFICANT CHANGE UP (ref 0–6)
EPI CELLS # UR: 1 /HPF — SIGNIFICANT CHANGE UP
ERYTHROCYTE [SEDIMENTATION RATE] IN BLOOD: 80 MM/HR — HIGH (ref 0–20)
FERRITIN SERPL-MCNC: 240 NG/ML — SIGNIFICANT CHANGE UP (ref 30–400)
FERRITIN SERPL-MCNC: 280 NG/ML — SIGNIFICANT CHANGE UP (ref 30–400)
GAS PNL BLDA: SIGNIFICANT CHANGE UP
GLUCOSE SERPL-MCNC: 89 MG/DL — SIGNIFICANT CHANGE UP (ref 70–99)
GLUCOSE UR QL: NEGATIVE — SIGNIFICANT CHANGE UP
HCT VFR BLD CALC: 40.4 % — SIGNIFICANT CHANGE UP (ref 39–50)
HGB BLD-MCNC: 13 G/DL — SIGNIFICANT CHANGE UP (ref 13–17)
HYALINE CASTS # UR AUTO: 8 /LPF — HIGH (ref 0–2)
IMM GRANULOCYTES NFR BLD AUTO: 0.4 % — SIGNIFICANT CHANGE UP (ref 0–1.5)
INR BLD: 4.51 RATIO — HIGH (ref 0.88–1.16)
KETONES UR-MCNC: ABNORMAL
LDH SERPL L TO P-CCNC: 328 U/L — HIGH (ref 50–242)
LEUKOCYTE ESTERASE UR-ACNC: NEGATIVE — SIGNIFICANT CHANGE UP
LYMPHOCYTES # BLD AUTO: 0.98 K/UL — LOW (ref 1–3.3)
LYMPHOCYTES # BLD AUTO: 19.1 % — SIGNIFICANT CHANGE UP (ref 13–44)
MAGNESIUM SERPL-MCNC: 2.5 MG/DL — SIGNIFICANT CHANGE UP (ref 1.6–2.6)
MCHC RBC-ENTMCNC: 28.8 PG — SIGNIFICANT CHANGE UP (ref 27–34)
MCHC RBC-ENTMCNC: 32.2 GM/DL — SIGNIFICANT CHANGE UP (ref 32–36)
MCV RBC AUTO: 89.4 FL — SIGNIFICANT CHANGE UP (ref 80–100)
MONOCYTES # BLD AUTO: 0.77 K/UL — SIGNIFICANT CHANGE UP (ref 0–0.9)
MONOCYTES NFR BLD AUTO: 15 % — HIGH (ref 2–14)
NEUTROPHILS # BLD AUTO: 3.34 K/UL — SIGNIFICANT CHANGE UP (ref 1.8–7.4)
NEUTROPHILS NFR BLD AUTO: 65.3 % — SIGNIFICANT CHANGE UP (ref 43–77)
NITRITE UR-MCNC: NEGATIVE — SIGNIFICANT CHANGE UP
NRBC # BLD: 0 /100 WBCS — SIGNIFICANT CHANGE UP (ref 0–0)
OSMOLALITY UR: 516 MOSM/KG — SIGNIFICANT CHANGE UP (ref 50–1200)
PH UR: 5.5 — SIGNIFICANT CHANGE UP (ref 5–8)
PLATELET # BLD AUTO: 93 K/UL — LOW (ref 150–400)
POTASSIUM SERPL-MCNC: 5.6 MMOL/L — HIGH (ref 3.5–5.3)
POTASSIUM SERPL-SCNC: 5.6 MMOL/L — HIGH (ref 3.5–5.3)
PROCALCITONIN SERPL-MCNC: 0.2 NG/ML — HIGH (ref 0.02–0.1)
PROT SERPL-MCNC: 7.7 G/DL — SIGNIFICANT CHANGE UP (ref 6–8.3)
PROT UR-MCNC: ABNORMAL
PROTHROM AB SERPL-ACNC: 54.4 SEC — HIGH (ref 10–12.9)
RBC # BLD: 4.52 M/UL — SIGNIFICANT CHANGE UP (ref 4.2–5.8)
RBC # FLD: 14 % — SIGNIFICANT CHANGE UP (ref 10.3–14.5)
RBC CASTS # UR COMP ASSIST: 3 /HPF — SIGNIFICANT CHANGE UP (ref 0–4)
SARS-COV-2 RNA SPEC QL NAA+PROBE: DETECTED
SARS-COV-2 RNA SPEC QL NAA+PROBE: DETECTED
SODIUM SERPL-SCNC: 132 MMOL/L — LOW (ref 135–145)
SP GR SPEC: 1.02 — SIGNIFICANT CHANGE UP (ref 1.01–1.02)
SPECIMEN SOURCE: SIGNIFICANT CHANGE UP
URATE UR-MCNC: 26.1 MG/DL — SIGNIFICANT CHANGE UP
UROBILINOGEN FLD QL: NEGATIVE — SIGNIFICANT CHANGE UP
WBC # BLD: 5.12 K/UL — SIGNIFICANT CHANGE UP (ref 3.8–10.5)
WBC # FLD AUTO: 5.12 K/UL — SIGNIFICANT CHANGE UP (ref 3.8–10.5)
WBC UR QL: 1 /HPF — SIGNIFICANT CHANGE UP (ref 0–5)

## 2020-04-01 PROCEDURE — 99223 1ST HOSP IP/OBS HIGH 75: CPT | Mod: 25,GC

## 2020-04-01 PROCEDURE — 99291 CRITICAL CARE FIRST HOUR: CPT

## 2020-04-01 PROCEDURE — 99232 SBSQ HOSP IP/OBS MODERATE 35: CPT

## 2020-04-01 PROCEDURE — 99232 SBSQ HOSP IP/OBS MODERATE 35: CPT | Mod: GC

## 2020-04-01 PROCEDURE — 93750 INTERROGATION VAD IN PERSON: CPT

## 2020-04-01 RX ORDER — CHLORHEXIDINE GLUCONATE 213 G/1000ML
1 SOLUTION TOPICAL
Refills: 0 | Status: DISCONTINUED | OUTPATIENT
Start: 2020-04-01 | End: 2020-04-20

## 2020-04-01 RX ORDER — SODIUM ZIRCONIUM CYCLOSILICATE 10 G/10G
10 POWDER, FOR SUSPENSION ORAL ONCE
Refills: 0 | Status: COMPLETED | OUTPATIENT
Start: 2020-04-01 | End: 2020-04-01

## 2020-04-01 RX ORDER — HYDROXYCHLOROQUINE SULFATE 200 MG
TABLET ORAL
Refills: 0 | Status: COMPLETED | OUTPATIENT
Start: 2020-04-01 | End: 2020-04-05

## 2020-04-01 RX ORDER — HYDROXYCHLOROQUINE SULFATE 200 MG
200 TABLET ORAL EVERY 12 HOURS
Refills: 0 | Status: COMPLETED | OUTPATIENT
Start: 2020-04-02 | End: 2020-04-05

## 2020-04-01 RX ORDER — HYDROXYCHLOROQUINE SULFATE 200 MG
400 TABLET ORAL EVERY 12 HOURS
Refills: 0 | Status: COMPLETED | OUTPATIENT
Start: 2020-04-01 | End: 2020-04-01

## 2020-04-01 RX ORDER — THIAMINE MONONITRATE (VIT B1) 100 MG
200 TABLET ORAL
Refills: 0 | Status: DISCONTINUED | OUTPATIENT
Start: 2020-04-01 | End: 2020-04-01

## 2020-04-01 RX ORDER — SODIUM CHLORIDE 9 MG/ML
500 INJECTION INTRAMUSCULAR; INTRAVENOUS; SUBCUTANEOUS ONCE
Refills: 0 | Status: COMPLETED | OUTPATIENT
Start: 2020-04-01 | End: 2020-04-01

## 2020-04-01 RX ORDER — MAGNESIUM SULFATE 500 MG/ML
2 VIAL (ML) INJECTION ONCE
Refills: 0 | Status: COMPLETED | OUTPATIENT
Start: 2020-04-01 | End: 2020-04-01

## 2020-04-01 RX ORDER — ASCORBIC ACID 60 MG
1500 TABLET,CHEWABLE ORAL
Refills: 0 | Status: DISCONTINUED | OUTPATIENT
Start: 2020-04-01 | End: 2020-04-01

## 2020-04-01 RX ADMIN — Medication 50 GRAM(S): at 20:11

## 2020-04-01 RX ADMIN — SODIUM CHLORIDE 3 MILLILITER(S): 9 INJECTION INTRAMUSCULAR; INTRAVENOUS; SUBCUTANEOUS at 12:13

## 2020-04-01 RX ADMIN — Medication 400 MILLIGRAM(S): at 21:58

## 2020-04-01 RX ADMIN — Medication 100 MILLIGRAM(S): at 18:41

## 2020-04-01 RX ADMIN — CEFEPIME 100 MILLIGRAM(S): 1 INJECTION, POWDER, FOR SOLUTION INTRAMUSCULAR; INTRAVENOUS at 21:57

## 2020-04-01 RX ADMIN — SODIUM ZIRCONIUM CYCLOSILICATE 10 GRAM(S): 10 POWDER, FOR SUSPENSION ORAL at 18:41

## 2020-04-01 RX ADMIN — SODIUM CHLORIDE 3 MILLILITER(S): 9 INJECTION INTRAMUSCULAR; INTRAVENOUS; SUBCUTANEOUS at 22:00

## 2020-04-01 RX ADMIN — Medication 650 MILLIGRAM(S): at 19:02

## 2020-04-01 RX ADMIN — SODIUM CHLORIDE 1000 MILLILITER(S): 9 INJECTION INTRAMUSCULAR; INTRAVENOUS; SUBCUTANEOUS at 20:11

## 2020-04-01 RX ADMIN — SODIUM CHLORIDE 1000 MILLILITER(S): 9 INJECTION INTRAMUSCULAR; INTRAVENOUS; SUBCUTANEOUS at 09:12

## 2020-04-01 RX ADMIN — SODIUM CHLORIDE 3 MILLILITER(S): 9 INJECTION INTRAMUSCULAR; INTRAVENOUS; SUBCUTANEOUS at 06:00

## 2020-04-01 RX ADMIN — SODIUM CHLORIDE 3 MILLILITER(S): 9 INJECTION INTRAMUSCULAR; INTRAVENOUS; SUBCUTANEOUS at 04:24

## 2020-04-01 RX ADMIN — MIRTAZAPINE 7.5 MILLIGRAM(S): 45 TABLET, ORALLY DISINTEGRATING ORAL at 23:55

## 2020-04-01 RX ADMIN — Medication 200 MILLIGRAM(S): at 09:09

## 2020-04-01 RX ADMIN — Medication 81 MILLIGRAM(S): at 12:45

## 2020-04-01 RX ADMIN — Medication 100 MILLIGRAM(S): at 09:09

## 2020-04-01 RX ADMIN — Medication 400 MILLIGRAM(S): at 11:17

## 2020-04-01 RX ADMIN — FAMOTIDINE 20 MILLIGRAM(S): 10 INJECTION INTRAVENOUS at 11:17

## 2020-04-01 RX ADMIN — MIRTAZAPINE 7.5 MILLIGRAM(S): 45 TABLET, ORALLY DISINTEGRATING ORAL at 04:37

## 2020-04-01 NOTE — CHART NOTE - NSCHARTNOTEFT_GEN_A_CORE
MICU Transfer to [facility]     CHIEF COMPLAINT: Chills, weakness, abd pain, diarrhea and vomiting    HPI:  64 year old Male with PMHx of ACC/AHA stage D HF d/t NICM s/p HM2 LVAD, TV annuloplasty ring (17), severe PAD, SIADH, BPH, Depression, CKD-3 with hyperkalemia, past E. coli UTIs p/w generalized weakness since 4 days, chills since 3 days, non-specific abdominal pain and constipation since 2 days with loose stools. Endorsed sick contacts with Covid-19 at home. Denied  cough, difficulty breathing, chest pain, palpitations. Found to be Tachycardic, +COVID  MICU consulted d/t questionable hypotension potentially requiring pressor support likely in s/o severe sepsis 2/2 COVID-19 PNA as well as close monitoring d/t h/o LVAD. Heart Failure specialists following and patient found not to be hypotensive based on cardiac index.     PAST MEDICAL & SURGICAL HISTORY:  Pleural effusion  Depression  CAD (coronary artery disease)  CHF (congestive heart failure)  S/P ventricular assist device  S/P TVR (tricuspid valve repair)    OBJECTIVE:  ICU Vital Signs Last 24 Hrs  T(C): 37.2 (2020 01:06), Max: 37.2 (31 Mar 2020 17:49)  T(F): 99 (2020 01:06), Max: 99 (31 Mar 2020 17:49)  HR: 116 (2020 01:06) (93 - 118)  BP(mean): 64 (2020 01:06) (64 - 68)  RR: 25 (2020 01:06) (18 - 25)  SpO2: 100% (2020 01:06) (96% - 100%)    Physical Exam:  In accordance with current standards limiting patient contact please refer to the recent ED physical exam    HOSPITAL MEDICATIONS:  MEDICATIONS  (STANDING):  aspirin enteric coated 81 milliGRAM(s) Oral daily  cefepime   IVPB 2000 milliGRAM(s) IV Intermittent every 24 hours  famotidine    Tablet 20 milliGRAM(s) Oral daily  lactated ringers Bolus 250 milliLiter(s) IV Bolus once  metoprolol succinate  milliGRAM(s) Oral daily  metroNIDAZOLE  IVPB 500 milliGRAM(s) IV Intermittent every 12 hours  mirtazapine 7.5 milliGRAM(s) Oral at bedtime  sodium chloride 0.9% lock flush 3 milliLiter(s) IV Push every 8 hours    MEDICATIONS  (PRN):  acetaminophen   Tablet .. 650 milliGRAM(s) Oral every 6 hours PRN Temp greater or equal to 38C (100.4F), Moderate Pain (4 - 6)    LABS:                        14.4   4.30  )-----------( 112      ( 31 Mar 2020 16:49 )             45.8     Hgb Trend: 14.4<--      133<L>  |  100  |  48<H>  ----------------------------<  79  4.9   |  17<L>  |  3.50<H>    Ca    8.3<L>      31 Mar 2020 23:06    TPro  7.7  /  Alb  3.6  /  TBili  0.2  /  DBili  x   /  AST  36  /  ALT  10  /  AlkPhos  58      Creatinine Trend: 3.50<--, 4.20<--, 4.21<--  PT/INR - ( 31 Mar 2020 19:57 )   PT: 41.7 sec;   INR: 3.52 ratio    PTT - ( 31 Mar 2020 19:57 )  PTT:40.8 sec  Urinalysis Basic - ( 31 Mar 2020 23:06 )    Trops 568 --> 683   --> 336  CKMB 12.2 --> 15.5    Lipase 41    ESR 87 <H>  CRP 5.15 <H>  Ferritin 280    Color: Yellow / Appearance: Slightly Turbid / S.021 / pH: x  Gluc: x / Ketone: Small  / Bili: Negative / Urobili: Negative   Blood: x / Protein: 30 mg/dL / Nitrite: Negative   Leuk Esterase: Negative / RBC: 3 /hpf / WBC 1 /HPF   Sq Epi: x / Non Sq Epi: 1 /hpf / Bacteria: Negative      Arterial Blood Gas:   @ 16:41  7.33/34/342/17/100/-7.2  ABG lactate: --    Venous Blood Gas:   @ 19:57  7.30/47/29/22/41  VBG Lactate: 2.3  Venous Blood Gas:   @ 16:49  7.25/56/24/24/27  VBG Lactate: 2.3    - COVID PCR (3/31): detected    MICROBIOLOGY:     RADIOLOGY & ADDITIONAL TESTS:    < from: Xray Chest 1 View AP/PA (20 @ 17:27) >    IMPRESSION:   Subtle linear opacities at the right mid lung field correlate to right upper lobe groundglass opacities better characterized on the same day chest CT.    IMPRESSION:   Chest CT:  New nonspecific groundglass opacities in the right upper lobe. CT findings of pulmonary infection/inflammation compatible with many entities (C19V-2).  If COVID-19 is suspected, correlation with RT-PCR is recommended.     CT abdomen and pelvis:  No acute intra-abdominal pathology on this noncontrast exam.    ASSESSMENT AND PLAN:  64M PMH ACC/AHA stage D HF d/t NICM HM2 LVAD , TV annuloplasty ring (17) as destination therapy due to severe PAD with significant stenosis, BPH, SIADH, Depression, CKD-3 with hyperkalemia, past E. coli UTIs p/w  generalized weakness, chills, non-specific abdominal pain and constipation &  loose stools, Covid-19 sick contacts at home a/f COVID+ PNA.    Neuro: Depression  - AxOx3  - c/w home Remeron    Cardiovascular: Elevated Troponins likely in s/o COVID-19 PNA  - all cardiac markers elevated. Trend Troponins   - no SIMON/STD/TWIs on admission EKG  - Seen by HF team and cleared for floor. Not hypotensive based on cardiac index     HFrEF 2/2 NICM s/p LVAD  - ECHO (2019): mild MR, severe segmental LV sys dysfn, several areas of akinesis, Stage I diastolic dysfxn, RVE w/dec RV sys fxn  - gave 500cc NS bolus per HF recs (LVAD #'s c/f pre-renal etiology)  - c/w home ASA, Toprol  - holding home lisinopril in s/o RASHAWN on CKD  - INR > 3.5, holding home coumadin. Follow up repeat INR and decide on holding or continuing coumadin     Respiratory  - on 3.5L NC, wean as tolerated  - CT chest (3/31): New nonspecific ground glass opacities in RUL likely from COVID-19 PNA    GI/Nutrition  Abdominal pain of unclear etiology  - will get GI PCR (diarrhea PTA) with next BM  - CTAP: no acute pathology  - lipase WNL (emesis PTA)  - c/w cefepime & flagyl 500mg IV q12h (renally dose) per ID  - c/w home pepcid    /Renal  RASHAWN on CKD3 w/hyperkalemia likely 2/2 ATN  2/2 COVID-19 PNA  - baseline Cr 1.3-1.7 based on [employer]  - Hyperkalemia on admission s/p insulin/glucose  - U/A with trace blood, protein 30mg/dl, small ketones  - s/p 250cc LR bolusx2 in ED  - Cr improving, trend BMP daily    Mild Hyponatremia, Na 131 on admission  - h/o SIADH  - Fena 0.5% indicative of pre-renal etiology likely in s/o dehydration for vomiting and diarrhea  - CTM    Oliguria likely 2/2 ATN 2/2 COVID-19 PNA vs dehydration for vomiting and diarrhea  - currently only making 20cc/hr s/p 250cc bolus LRx2  - CTM    BPH  - holding home meds in s/o HTN    ID  COVID-19 PNA  - COVID-19+ on PCR (3/31), severe sepsis on admission (tachycardic, tachypneic, lactate 2.3, low MAP, RASHAWN)  - CT chest (3/31): New nonspecific groundglass opacities in RUL  - will start Plaquenil. D/c vitC /thiamine due to excess volume and questionable use PO as opposed to IV   - daily EKGs to monitor for QTc prolongation (422 on admission)  - procalc elevated at 0.45  - trend ESR/CRP, Ferritin, LDH, d-dimer, lactate, CK, Trops, T-cell subset  - f/u blood cx, urine Cx, GI PCR, repeat Covid-19 PCR  - f/u G6PD    Endocrine  - f/u TSH (tachy on admission)    Hematologic/DVT ppx  - mild thrombocytopenia (plts 112 on admission), likely in s/o infection  - trend CBCD  - INR > 3.5, holding home coumadin    Ethics  Full code    D/w MICU attending and HF team MICU Transfer to [facility]     CHIEF COMPLAINT: Chills, weakness, abd pain, diarrhea and vomiting    HPI:  64 year old Male with PMHx of ACC/AHA stage D HF d/t NICM s/p HM2 LVAD, TV annuloplasty ring (17), severe PAD, SIADH, BPH, Depression, CKD-3 with hyperkalemia, past E. coli UTIs p/w generalized weakness since 4 days, chills since 3 days, non-specific abdominal pain and constipation since 2 days with loose stools. Endorsed sick contacts with Covid-19 at home. Denied  cough, difficulty breathing, chest pain, palpitations. Found to be Tachycardic, +COVID  MICU consulted d/t questionable hypotension potentially requiring pressor support likely in s/o severe sepsis 2/2 COVID-19 PNA as well as close monitoring d/t h/o LVAD. Heart Failure specialists following and patient found not to be hypotensive based on cardiac index.     PAST MEDICAL & SURGICAL HISTORY:  Pleural effusion  Depression  CAD (coronary artery disease)  CHF (congestive heart failure)  S/P ventricular assist device  S/P TVR (tricuspid valve repair)    OBJECTIVE:  ICU Vital Signs Last 24 Hrs  T(C): 37.2 (2020 01:06), Max: 37.2 (31 Mar 2020 17:49)  T(F): 99 (2020 01:06), Max: 99 (31 Mar 2020 17:49)  HR: 116 (2020 01:06) (93 - 118)  BP(mean): 64 (2020 01:06) (64 - 68)  RR: 25 (2020 01:06) (18 - 25)  SpO2: 100% (2020 01:06) (96% - 100%)    Physical Exam:  In accordance with current standards limiting patient contact please refer to the recent ED physical exam    HOSPITAL MEDICATIONS:  MEDICATIONS  (STANDING):  aspirin enteric coated 81 milliGRAM(s) Oral daily  cefepime   IVPB 2000 milliGRAM(s) IV Intermittent every 24 hours  famotidine    Tablet 20 milliGRAM(s) Oral daily  lactated ringers Bolus 250 milliLiter(s) IV Bolus once  metoprolol succinate  milliGRAM(s) Oral daily  metroNIDAZOLE  IVPB 500 milliGRAM(s) IV Intermittent every 12 hours  mirtazapine 7.5 milliGRAM(s) Oral at bedtime  sodium chloride 0.9% lock flush 3 milliLiter(s) IV Push every 8 hours    MEDICATIONS  (PRN):  acetaminophen   Tablet .. 650 milliGRAM(s) Oral every 6 hours PRN Temp greater or equal to 38C (100.4F), Moderate Pain (4 - 6)    LABS:                        14.4   4.30  )-----------( 112      ( 31 Mar 2020 16:49 )             45.8     Hgb Trend: 14.4<--      133<L>  |  100  |  48<H>  ----------------------------<  79  4.9   |  17<L>  |  3.50<H>    Ca    8.3<L>      31 Mar 2020 23:06    TPro  7.7  /  Alb  3.6  /  TBili  0.2  /  DBili  x   /  AST  36  /  ALT  10  /  AlkPhos  58      Creatinine Trend: 3.50<--, 4.20<--, 4.21<--  PT/INR - ( 31 Mar 2020 19:57 )   PT: 41.7 sec;   INR: 3.52 ratio    PTT - ( 31 Mar 2020 19:57 )  PTT:40.8 sec  Urinalysis Basic - ( 31 Mar 2020 23:06 )    Trops 568 --> 683   --> 336  CKMB 12.2 --> 15.5    Lipase 41    ESR 87 <H>  CRP 5.15 <H>  Ferritin 280    Color: Yellow / Appearance: Slightly Turbid / S.021 / pH: x  Gluc: x / Ketone: Small  / Bili: Negative / Urobili: Negative   Blood: x / Protein: 30 mg/dL / Nitrite: Negative   Leuk Esterase: Negative / RBC: 3 /hpf / WBC 1 /HPF   Sq Epi: x / Non Sq Epi: 1 /hpf / Bacteria: Negative      Arterial Blood Gas:   @ 16:41  7.33/34/342/17/100/-7.2  ABG lactate: --    Venous Blood Gas:   @ 19:57  7.30/47/29/22/41  VBG Lactate: 2.3  Venous Blood Gas:   @ 16:49  7.25/56/24/24/27  VBG Lactate: 2.3    - COVID PCR (3/31): detected    MICROBIOLOGY:     RADIOLOGY & ADDITIONAL TESTS:    < from: Xray Chest 1 View AP/PA (20 @ 17:27) >    IMPRESSION:   Subtle linear opacities at the right mid lung field correlate to right upper lobe groundglass opacities better characterized on the same day chest CT.    IMPRESSION:   Chest CT:  New nonspecific groundglass opacities in the right upper lobe. CT findings of pulmonary infection/inflammation compatible with many entities (C19V-2).  If COVID-19 is suspected, correlation with RT-PCR is recommended.     CT abdomen and pelvis:  No acute intra-abdominal pathology on this noncontrast exam.    ASSESSMENT AND PLAN:  64M PMH ACC/AHA stage D HF d/t NICM HM2 LVAD , TV annuloplasty ring (17) as destination therapy due to severe PAD with significant stenosis, BPH, SIADH, Depression, CKD-3 with hyperkalemia, past E. coli UTIs p/w  generalized weakness, chills, non-specific abdominal pain and constipation &  loose stools, Covid-19 sick contacts at home a/f COVID+ PNA.    Neuro: Depression  - AxOx3  - c/w home Remeron    Cardiovascular: Elevated Troponins likely in s/o COVID-19 PNA  - all cardiac markers elevated. Trend Troponins   - no SIMON/STD/TWIs on admission EKG  - Seen by HF team and cleared for floor. Not hypotensive based on cardiac index     HFrEF 2/2 NICM s/p LVAD  - ECHO (2019): mild MR, severe segmental LV sys dysfn, several areas of akinesis, Stage I diastolic dysfxn, RVE w/dec RV sys fxn  - gave 500cc NS bolus per HF recs (LVAD #'s c/f pre-renal etiology)  - c/w home ASA, Toprol  - holding home lisinopril in s/o RASHAWN on CKD  - INR > 3.5, holding home coumadin. Follow up repeat INR and decide on holding or continuing coumadin     Respiratory  - on 3.5L NC, wean as tolerated  - CT chest (3/31): New nonspecific ground glass opacities in RUL likely from COVID-19 PNA    GI/Nutrition  Abdominal pain of unclear etiology  - will get GI PCR (diarrhea PTA) with next BM  - CTAP: no acute pathology  - lipase WNL (emesis PTA)  - c/w cefepime & flagyl 500mg IV q12h (renally dose) per ID  - c/w home pepcid    /Renal  RASHAWN on CKD3 w/hyperkalemia likely 2/2 ATN  2/2 COVID-19 PNA  - baseline Cr 1.3-1.7 based on [employer]  - Hyperkalemia on admission s/p insulin/glucose  - U/A with trace blood, protein 30mg/dl, small ketones  - s/p 250cc LR bolusx2 in ED  - Cr improving, trend BMP daily    Mild Hyponatremia, Na 131 on admission  - h/o SIADH  - Fena 0.5% indicative of pre-renal etiology likely in s/o dehydration for vomiting and diarrhea  - CTM    Oliguria likely 2/2 ATN 2/2 COVID-19 PNA vs dehydration for vomiting and diarrhea  - currently only making 20cc/hr s/p 250cc bolus LRx2  - CTM    BPH  - holding home meds in s/o HTN    ID  COVID-19 PNA  - COVID-19+ on PCR (3/31), severe sepsis on admission (tachycardic, tachypneic, lactate 2.3, low MAP, RASHAWN)  - CT chest (3/31): New nonspecific groundglass opacities in RUL  - will start Plaquenil. D/c vitC /thiamine due to excess volume and questionable use PO as opposed to IV   - daily EKGs to monitor for QTc prolongation (422 on admission)  - procalc elevated at 0.45  - trend ESR/CRP, Ferritin, LDH, d-dimer, lactate, CK, Trops, T-cell subset  - f/u blood cx, urine Cx, GI PCR, repeat Covid-19 PCR  - f/u G6PD    Endocrine  - f/u TSH (tachy on admission)    Hematologic/DVT ppx  - mild thrombocytopenia (plts 112 on admission), likely in s/o infection  - trend CBCD  - INR > 3.5, holding home coumadin    Ethics  Full code    D/w MICU attending and HF team    Attending attestation  Agree with assessment and plan as outlined above. Volume depletion in the setting of LVAD/covid infection. Heart failure follow-up. >35 critical care time spent at the bedside.

## 2020-04-01 NOTE — CONSULT NOTE ADULT - SUBJECTIVE AND OBJECTIVE BOX
HPI:  Mr. Rasheed is a 64 year old man with ACC/AHA stage D HF due to NICM, s/p HM2 LVAD with TV annuloplasty ring in 9/12/17 as destination therapy due to severe peripheral artery disease with significant stenosis. He has had a history of SIADH, and stage III CKD with prior episodes of hyperkalemia.    He presents with weakness for 4 days, chills since 3 days, non-specific abdominal pain and constipation since 2 days with  loose stools today. He was found to acute renal failure and elevated INR. He received volume over night with improvement in blood pressure, urine output, and abdominal symptoms. He currently denies  cough, difficulty breathing, chest pain, or palpitations. He continues to have abdominal pain, but it has improved.     PAST MEDICAL & SURGICAL HISTORY:  ACC/AHA stage D HF due to NICM, s/p HM2 LVAD with TV annuloplasty ring in 9/12/17  Peripheral arterial disease  Stage III CKD  Depression    Allergies    No Known Allergies    Home Medications:  acetaminophen 325 mg oral tablet: 2 tab(s) orally every 6 hours, As needed, For Temp greater than 38.5 C (101.3 F) (31 Mar 2020 17:02)  famotidine 20 mg oral tablet: 1 tab(s) orally once a day (31 Mar 2020 17:02)  lisinopril 20 mg oral tablet: 1 tab(s) orally 2 times a day (31 Mar 2020 17:02)  metoprolol succinate 50 mg oral tablet, extended release: 4 tab(s) orally once a day (31 Mar 2020 17:02)  silodosin 8 mg oral capsule: 1 cap(s) orally once a day with food (31 Mar 2020 17:02)  trospium extended release: 20 milligram(s) orally once a day (in the morning) (31 Mar 2020 17:02)    SOCIAL HISTORY: He lives alone. He does not smoke cigarettes or drink alcohol.     FAMILY HISTORY:  No pertinent family history in first degree relatives    ROS: 14 point review of systems otherwise negative apart from as documented in HPI.     Vital Signs Last 24 Hrs  T(C): 36.6 (01 Apr 2020 10:00), Max: 37.3 (01 Apr 2020 08:00)  T(F): 97.9 (01 Apr 2020 10:00), Max: 99.1 (01 Apr 2020 08:00)  HR: 102 (01 Apr 2020 10:00) (93 - 118)  BP: 75/46 (01 Apr 2020 07:09) (75/46 - 75/46)  BP(mean): 70 (01 Apr 2020 10:00) (55 - 70)  RR: 30 (01 Apr 2020 10:00) (18 - 34)  SpO2: 100% (01 Apr 2020 10:00) (96% - 100%)    Physical Exam:     General: No distress. Comfortable.  HEENT: EOM intact.  Neck: Neck supple. JVP not elevated. No masses  Chest: Clear to auscultation bilaterally  CV: Typical LVAD sounds. No distal pulses  Abdomen: Soft, non-distended, non-tender  Driveline exit site: Clean, dry, and intact  Skin: No rashes or skin breakdown  Neurology: Alert and oriented times three. Sensation intact  Psych: Affect normal    LVAD Interrogation: Heart Mate II  RPMs: 8800  Power: 4.5  Flow: 4.3  PI: 4.5  Event: Multiple PI events with slow suction type events.   No programming changes were made                          13.0   5.12  )-----------( 93       ( 01 Apr 2020 10:28 )             40.4     04-01    132<L>  |  101  |  52<H>  ----------------------------<  89  5.6<H>   |  16<L>  |  2.28<H>    Ca    8.4      01 Apr 2020 10:28  Mg     2.5     04-01    TPro  7.7  /  Alb  3.8  /  TBili  0.2  /  DBili  x   /  AST  34  /  ALT  14  /  AlkPhos  59  04-01    COVID-19 PCR: Detected: This test has been validated by [facility] to be accurate;  though it has not been FDA cleared/approved by the usual pathway.  As with all laboratory tests, results should be correlated with clinical  findings. (03.31.20 @ 23:06)

## 2020-04-01 NOTE — PROVIDER CONTACT NOTE (CHANGE IN STATUS NOTIFICATION) - BACKGROUND
Pt MAP maintains 60-70 pt on no HF medications 2/2 low BP. Pt tested postive COVID 3/31 with s/s of stomach pain, n/v, and diarrhea x2 days.

## 2020-04-01 NOTE — PROGRESS NOTE ADULT - ASSESSMENT
This a 65 y/o male PMH ACC/AHA stage D HF due to NICM HM2 LVAD, TV annuloplasty ring 9/12/17 as destination therapy due to severe peripheral artery disease with significant stenosis, SIADH, Depression, CKD-3, with hyperkalemia past E. coli UTIs. Today he reports generalized weakness since 4 days, chills since 3 days, non-specific abdominal pain and constipation since 2 days with loose stools today.  Reports +Covid-19 sick contacts at home. Denies cough, difficulty breathing, chest pain, palpitations. Directed to [facility] ED r/o COVID. Seen by ID -BCx & urine Cx sent, COVID 19 PCR positive- started on Plaquenil, cefepime and flagyl. Renal function improved with volume resuscitation, home BP medications currently on hold.

## 2020-04-01 NOTE — PROGRESS NOTE ADULT - PROBLEM SELECTOR PLAN 3
K 7.3 s/p Insulin, D50 and Lokelma 10mgx1 -> repeat 4.9 now back to 5.6  Lokelma 10mg x1 dose  Repeat K at 2200

## 2020-04-01 NOTE — PROGRESS NOTE ADULT - SUBJECTIVE AND OBJECTIVE BOX
INFECTIOUS DISEASES FOLLOW UP-- Anitra Prater  [phone]    This is a follow up note for this  64yMale with  Shortness of breath      ROS:  CONSTITUTIONAL:  low grade fever, poor appetite  CARDIOVASCULAR:  No chest pain or palpitations  RESPIRATORY:  No dyspnea  GASTROINTESTINAL:  No nausea, vomiting, diarrhea, or abdominal pain  GENITOURINARY:  No dysuria  NEUROLOGIC:  No headache,     Allergies    No Known Allergies    Intolerances        ANTIBIOTICS/RELEVANT:  antimicrobials  cefepime   IVPB      cefepime   IVPB 2000 milliGRAM(s) IV Intermittent every 24 hours  hydroxychloroquine   Oral   hydroxychloroquine 400 milliGRAM(s) Oral every 12 hours  metroNIDAZOLE  IVPB      metroNIDAZOLE  IVPB 500 milliGRAM(s) IV Intermittent every 12 hours    immunologic:    OTHER:  acetaminophen   Tablet .. 650 milliGRAM(s) Oral every 6 hours PRN  aspirin enteric coated 81 milliGRAM(s) Oral daily  chlorhexidine 4% Liquid 1 Application(s) Topical <User Schedule>  famotidine    Tablet 20 milliGRAM(s) Oral daily  mirtazapine 7.5 milliGRAM(s) Oral at bedtime  sodium chloride 0.9% lock flush 3 milliLiter(s) IV Push every 8 hours      Objective:  Vital Signs Last 24 Hrs  T(C): 37.4 (2020 14:10), Max: 37.4 (2020 14:10)  T(F): 99.4 (2020 14:10), Max: 99.4 (2020 14:10)  HR: 116 (2020 18:10) (102 - 118)  BP: 75/46 (2020 07:09) (75/46 - 75/46)  BP(mean): 66 (2020 16:40) (55 - 70)  RR: 20 (2020 18:10) (20 - 34)  SpO2: 100% (2020 18:10) (94% - 100%)    PHYSICAL EXAM:  Constitutional:no acute distress, but weak  Eyes:ALONOS, EOMI  Ear/Nose/Throat: no oral lesions, 	  Respiratory: clear BL  Cardiovascular: S1S2  Gastrointestinal:soft, (+) BS, some LLQ tenderness  Extremities:no e/e/c  No Lymphadenopathy  IV sites not inflammed.    LABS:                        13.0   5.12  )-----------( 93       ( 2020 10:28 )             40.4     04-    132<L>  |  101  |  52<H>  ----------------------------<  89  5.6<H>   |  16<L>  |  2.28<H>    Ca    8.4      2020 10:28  Mg     2.5     04-    TPro  7.7  /  Alb  3.8  /  TBili  0.2  /  DBili  x   /  AST  34  /  ALT  14  /  AlkPhos  59  04-    PT/INR - ( 2020 10:28 )   PT: 54.4 sec;   INR: 4.51 ratio         PTT - ( 31 Mar 2020 19:57 )  PTT:40.8 sec  Urinalysis Basic - ( 31 Mar 2020 23:06 )    Color: Yellow / Appearance: Slightly Turbid / S.021 / pH: x  Gluc: x / Ketone: Small  / Bili: Negative / Urobili: Negative   Blood: x / Protein: 30 mg/dL / Nitrite: Negative   Leuk Esterase: Negative / RBC: 3 /hpf / WBC 1 /HPF   Sq Epi: x / Non Sq Epi: 1 /hpf / Bacteria: Negative        MICROBIOLOGY:      COVID-19 PCR: Detected: This test has been validated by [facility] [employer] [facility] to be accurate;  though it has not been FDA cleared/approved by the usual pathway.  As with all laboratory tests, results should be correlated with clinical  findings. (20 @ 23:06)          RECENT CULTURES:      RADIOLOGY & ADDITIONAL STUDIES:

## 2020-04-01 NOTE — PROGRESS NOTE ADULT - SUBJECTIVE AND OBJECTIVE BOX
Telemetry:   - 110s  Vital Signs Last 24 Hrs  T(C): 37.4 (04-01-20 @ 14:10), Max: 37.4 (04-01-20 @ 14:10)  T(F): 99.4 (04-01-20 @ 14:10), Max: 99.4 (04-01-20 @ 14:10)  HR: 110 (04-01-20 @ 14:10) (102 - 118)  BP: 75/46 (04-01-20 @ 07:09) (75/46 - 75/46)  RR: 22 (04-01-20 @ 14:10) (18 - 34)  SpO2: 94% (04-01-20 @ 14:10) (94% - 100%)             04-01 @ 07:01  -  04-01 @ 17:41  --------------------------------------------------------  IN: 750 mL / OUT: 650 mL / NET: 100 mL                          13.0   5.12  )-----------( 93       ( 01 Apr 2020 10:28 )             40.4     132<L>  |  101  |  52<H>  ----------------------------<  89  5.6<H>   |  16<L>  |  2.28<H>      AST  34  /  ALT  14  /  AlkPhos  59  04-01          MEDICATIONS  acetaminophen   Tablet .. 650 milliGRAM(s) Oral every 6 hours PRN  aspirin enteric coated 81 milliGRAM(s) Oral daily     cefepime   IVPB 2000 milliGRAM(s) IV Intermittent every 24 hours  chlorhexidine 4% Liquid 1 Application(s) Topical <User Schedule>  famotidine    Tablet 20 milliGRAM(s) Oral daily  hydroxychloroquine   Oral   hydroxychloroquine 400 milliGRAM(s) Oral every 12 hours  metroNIDAZOLE  IVPB      metroNIDAZOLE  IVPB 500 milliGRAM(s) IV Intermittent every 12 hours  mirtazapine 7.5 milliGRAM(s) Oral at bedtime  sodium chloride 0.9% lock flush 3 milliLiter(s) IV Push every 8 hours  sodium zirconium cyclosilicate 10 Gram(s) Oral once

## 2020-04-01 NOTE — PROGRESS NOTE ADULT - PROBLEM SELECTOR PLAN 1
COVID Isolation/ Droplet precautions   ID following  Continue cefepime 2g daily, IV flagyl 500 BID, and Plaquenil taper

## 2020-04-01 NOTE — CHART NOTE - NSCHARTNOTEFT_GEN_A_CORE
MICU Accept Note    CHIEF COMPLAINT:     HPI / INTERVAL HISTORY:   64M PMH ACC/AHA stage D HF d/t NICM HM2 LVAD , TV annuloplasty ring (17) as destination therapy due to severe PAD with significant stenosis  SIADH, Depression, CKD-3 with hyperkalemia, past E. coli UTIs.   Generally in good health able to maintain ADLSs euvolemic and normotensive.    sick contacts with Covid-19 at home and imaging findings of RUL opacities  Today  reports generalized weakness since 4 days, chills since 3 days, non-specific abdominal pain and constipation since 2 days with  loose stools today.  Reports sick contacts with Covid-19 at home. Denies  cough, difficulty breathing, chest pain, palpitations admitted for COVID PNA        PAST MEDICAL & SURGICAL HISTORY:  Pleural effusion  Depression  CAD (coronary artery disease)  CHF (congestive heart failure)  S/P ventricular assist device  S/P TVR (tricuspid valve repair)      FAMILY HISTORY:  No pertinent family history in first degree relatives      SOCIAL HISTORY:  Smoking:   Substance Use:   EtOH Use:   Marital Status:   Sexual History:   Occupation:  Recent Travel:  Country of Birth:   Advance Directives:     HOME MEDICATIONS:      Allergies    Allergy Status Unknown    Intolerances          REVIEW OF SYSTEMS:  Constitutional: No fevers, chills, weight loss, weight gain  HEENT: No vision problems, eye pain, nasal congestion, rhinorrhea, sore throat, dysphagia  CV: No chest pain, orthopnea, palpitations  Resp: No cough, dyspnea, wheezing, hemoptysis  GI: No nausea, vomiting, diarrhea, constipation, abdominal pain  : [ ] dysuria [ ] nocturia [ ] hematuria [ ] increased urinary frequency  Musculoskeletal: [ ] back pain [ ] myalgias [ ] arthralgias [ ] fracture  Skin: [ ] rash [ ] itch  Neurological: [ ] headache [ ] dizziness [ ] syncope [ ] weakness [ ] numbness  Psychiatric: [ ] anxiety [ ] depression  Endocrine: [ ] diabetes [ ] thyroid problem  Hematologic/Lymphatic: [ ] anemia [ ] bleeding problem  Allergic/Immunologic: [ ] itchy eyes [ ] nasal discharge [ ] hives [ ] angioedema  [ ] All other systems negative  [ ] Unable to assess ROS because ________    OBJECTIVE:  ICU Vital Signs Last 24 Hrs  T(C): 37.2 (2020 01:06), Max: 37.2 (31 Mar 2020 17:49)  T(F): 99 (2020 01:06), Max: 99 (31 Mar 2020 17:49)  HR: 116 (2020 01:06) (93 - 118)  BP: --  BP(mean): 64 (2020 01:06) (64 - 68)  ABP: --  ABP(mean): --  RR: 25 (2020 01:06) (18 - 25)  SpO2: 100% (2020 01:06) (96% - 100%)        CAPILLARY BLOOD GLUCOSE      POCT Blood Glucose.: 99 mg/dL (31 Mar 2020 21:23)      PHYSICAL EXAM:  General:   HEENT:   Neck:   Chest/Lungs:  Heart:  Abdomen:   Extremities:   Skin:   Neuro:   Psych:     LINES:     HOSPITAL MEDICATIONS:  MEDICATIONS  (STANDING):  aspirin enteric coated 81 milliGRAM(s) Oral daily  cefepime   IVPB      cefepime   IVPB 2000 milliGRAM(s) IV Intermittent every 24 hours  famotidine    Tablet 20 milliGRAM(s) Oral daily  lactated ringers Bolus 250 milliLiter(s) IV Bolus once  metoprolol succinate  milliGRAM(s) Oral daily  metroNIDAZOLE  IVPB      metroNIDAZOLE  IVPB 500 milliGRAM(s) IV Intermittent every 12 hours  mirtazapine 7.5 milliGRAM(s) Oral at bedtime  sodium chloride 0.9% lock flush 3 milliLiter(s) IV Push every 8 hours    MEDICATIONS  (PRN):  acetaminophen   Tablet .. 650 milliGRAM(s) Oral every 6 hours PRN Temp greater or equal to 38C (100.4F), Moderate Pain (4 - 6)      LABS:                        14.4   4.30  )-----------( 112      ( 31 Mar 2020 16:49 )             45.8     Hgb Trend: 14.4<--  03    133<L>  |  100  |  48<H>  ----------------------------<  79  4.9   |  17<L>  |  3.50<H>    Ca    8.3<L>      31 Mar 2020 23:06    TPro  7.7  /  Alb  3.6  /  TBili  0.2  /  DBili  x   /  AST  36  /  ALT  10  /  AlkPhos  58      Creatinine Trend: 3.50<--, 4.20<--, 4.21<--  PT/INR - ( 31 Mar 2020 19:57 )   PT: 41.7 sec;   INR: 3.52 ratio         PTT - ( 31 Mar 2020 19:57 )  PTT:40.8 sec  Urinalysis Basic - ( 31 Mar 2020 23:06 )    Color: Yellow / Appearance: Slightly Turbid / S.021 / pH: x  Gluc: x / Ketone: Small  / Bili: Negative / Urobili: Negative   Blood: x / Protein: 30 mg/dL / Nitrite: Negative   Leuk Esterase: Negative / RBC: 3 /hpf / WBC 1 /HPF   Sq Epi: x / Non Sq Epi: 1 /hpf / Bacteria: Negative      Arterial Blood Gas:   @ 16:41  7.33/34/342/17/100/-7.2  ABG lactate: --    Venous Blood Gas:   @ 19:57  7.30/47/29/22/41  VBG Lactate: 2.3  Venous Blood Gas:   @ 16:49  7.25/56/24/24/27  VBG Lactate: 2.3      MICROBIOLOGY:     RADIOLOGY & ADDITIONAL TESTS:      ASSESSMENT AND PLAN:  Mr././Ms. is a ___    #Neuro    #Cardiovascular    #Respiratory    #GI/Nutrition    #/Renal    #Skin    #ID  - seen by ID in ED, appreciate recs  - c/w cefepime  - c/w Flagyl 500mg IV q12h  - would check repeat BMP (hemolyzed sample, unclear if BUN/Cr is falsely elevated - this will change antibiotic dosing)  - check CRP, Ferritin, LDH, d-dimer  - f/u blood cx, UA, urine Cx, GI PCR, Covid-19 PCR  - continue Contact and Airborne isolation precautions    #Endocrine    #Hematologic/DVT ppx    #Ethics      Truman Gould MD  EM/IM PGY1  Pager: [phone] MICU Accept Note    CHIEF COMPLAINT:     HPI / INTERVAL HISTORY:   64M PMH ACC/AHA stage D HF d/t NICM HM2 LVAD , TV annuloplasty ring (17) as destination therapy due to severe PAD with significant stenosis  SIADH, Depression, CKD-3 with hyperkalemia, past E. coli UTIs.   Generally in good health able to maintain ADLSs euvolemic and normotensive.    sick contacts with Covid-19 at home and imaging findings of RUL opacities  Today  reports generalized weakness since 4 days, chills since 3 days, non-specific abdominal pain and constipation since 2 days with  loose stools today.  Reports sick contacts with Covid-19 at home. Denies  cough, difficulty breathing, chest pain, palpitations admitted for COVID PNA        PAST MEDICAL & SURGICAL HISTORY:  Pleural effusion  Depression  CAD (coronary artery disease)  CHF (congestive heart failure)  S/P ventricular assist device  S/P TVR (tricuspid valve repair)      FAMILY HISTORY:  No pertinent family history in first degree relatives      SOCIAL HISTORY:  Smoking:   Substance Use:   EtOH Use:   Marital Status:   Sexual History:   Occupation:  Recent Travel:  Country of Birth:   Advance Directives:     HOME MEDICATIONS:      Allergies    Allergy Status Unknown    Intolerances          REVIEW OF SYSTEMS:  Constitutional: No fevers, chills, weight loss, weight gain  HEENT: No vision problems, eye pain, nasal congestion, rhinorrhea, sore throat, dysphagia  CV: No chest pain, orthopnea, palpitations  Resp: No cough, dyspnea, wheezing, hemoptysis  GI: No nausea, vomiting, diarrhea, constipation, abdominal pain  : [ ] dysuria [ ] nocturia [ ] hematuria [ ] increased urinary frequency  Musculoskeletal: [ ] back pain [ ] myalgias [ ] arthralgias [ ] fracture  Skin: [ ] rash [ ] itch  Neurological: [ ] headache [ ] dizziness [ ] syncope [ ] weakness [ ] numbness  Psychiatric: [ ] anxiety [ ] depression  Endocrine: [ ] diabetes [ ] thyroid problem  Hematologic/Lymphatic: [ ] anemia [ ] bleeding problem  Allergic/Immunologic: [ ] itchy eyes [ ] nasal discharge [ ] hives [ ] angioedema  [ ] All other systems negative  [ ] Unable to assess ROS because ________    OBJECTIVE:  ICU Vital Signs Last 24 Hrs  T(C): 37.2 (2020 01:06), Max: 37.2 (31 Mar 2020 17:49)  T(F): 99 (2020 01:06), Max: 99 (31 Mar 2020 17:49)  HR: 116 (2020 01:06) (93 - 118)  BP: --  BP(mean): 64 (2020 01:06) (64 - 68)  ABP: --  ABP(mean): --  RR: 25 (2020 01:06) (18 - 25)  SpO2: 100% (2020 01:06) (96% - 100%)        CAPILLARY BLOOD GLUCOSE      POCT Blood Glucose.: 99 mg/dL (31 Mar 2020 21:23)      PHYSICAL EXAM:  General:   HEENT:   Neck:   Chest/Lungs:  Heart:  Abdomen:   Extremities:   Skin:   Neuro:   Psych:     LINES:     HOSPITAL MEDICATIONS:  MEDICATIONS  (STANDING):  aspirin enteric coated 81 milliGRAM(s) Oral daily  cefepime   IVPB      cefepime   IVPB 2000 milliGRAM(s) IV Intermittent every 24 hours  famotidine    Tablet 20 milliGRAM(s) Oral daily  lactated ringers Bolus 250 milliLiter(s) IV Bolus once  metoprolol succinate  milliGRAM(s) Oral daily  metroNIDAZOLE  IVPB      metroNIDAZOLE  IVPB 500 milliGRAM(s) IV Intermittent every 12 hours  mirtazapine 7.5 milliGRAM(s) Oral at bedtime  sodium chloride 0.9% lock flush 3 milliLiter(s) IV Push every 8 hours    MEDICATIONS  (PRN):  acetaminophen   Tablet .. 650 milliGRAM(s) Oral every 6 hours PRN Temp greater or equal to 38C (100.4F), Moderate Pain (4 - 6)      LABS:                        14.4   4.30  )-----------( 112      ( 31 Mar 2020 16:49 )             45.8     Hgb Trend: 14.4<--  03    133<L>  |  100  |  48<H>  ----------------------------<  79  4.9   |  17<L>  |  3.50<H>    Ca    8.3<L>      31 Mar 2020 23:06    TPro  7.7  /  Alb  3.6  /  TBili  0.2  /  DBili  x   /  AST  36  /  ALT  10  /  AlkPhos  58      Creatinine Trend: 3.50<--, 4.20<--, 4.21<--  PT/INR - ( 31 Mar 2020 19:57 )   PT: 41.7 sec;   INR: 3.52 ratio         PTT - ( 31 Mar 2020 19:57 )  PTT:40.8 sec  Urinalysis Basic - ( 31 Mar 2020 23:06 )    Color: Yellow / Appearance: Slightly Turbid / S.021 / pH: x  Gluc: x / Ketone: Small  / Bili: Negative / Urobili: Negative   Blood: x / Protein: 30 mg/dL / Nitrite: Negative   Leuk Esterase: Negative / RBC: 3 /hpf / WBC 1 /HPF   Sq Epi: x / Non Sq Epi: 1 /hpf / Bacteria: Negative      Arterial Blood Gas:   @ 16:41  7.33/34/342/17/100/-7.2  ABG lactate: --    Venous Blood Gas:   @ 19:57  7.30/47/29/22/41  VBG Lactate: 2.3  Venous Blood Gas:   @ 16:49  7.25/56/24//27  VBG Lactate: 2.3      MICROBIOLOGY:     RADIOLOGY & ADDITIONAL TESTS:    < from: Xray Chest 1 View AP/PA (20 @ 17:27) >    IMPRESSION:   Subtle linear opacities at the right mid lung field correlate to right upper lobe groundglass opacities better characterized on the same day chest CT.    < end of copied text >    < from: CT Chest No Cont (20 @ 17:17) >    IMPRESSION:     Chest CT:  New nonspecific groundglass opacities in the right upper lobe. CT findings of pulmonary infection/inflammation compatible with many entities (C19V-2).  If COVID-19 is suspected, correlation with RT-PCR is recommended.       CT abdomen and pelvis:  No acute intra-abdominal pathology on this noncontrast exam.    < end of copied text >        ASSESSMENT AND PLAN:  Mr./Mrs./Ms. is a ___    #Neuro    #Cardiovascular  - daily EKGs to monitor for QTc prolongation    #Respiratory    #GI/Nutrition    #/Renal  RASHAWN    - U/A with trace blood, protein 30mg/dl, small ketones    #Skin    #ID  COVID PNA  - COVID+ on PCR (3/31)  - CT chest (3/31): New nonspecific groundglass opacities in RUL  - started on Plaquenil/Vit C/thiamine/Azithro  - c/w cefepime & flagyl 500mg IV q12h (renally dose)  - check CRP, Ferritin, LDH, d-dimer  - f/u blood cx, urine Cx, GI PCR, Covid-19 PCR  - continue Contact and Airborne isolation precautions    #Endocrine    #Hematologic/DVT ppx    #Ethics      Truman Gould MD  EM/IM PGY1  Pager: [phone] MICU Accept Note    CHIEF COMPLAINT:     HPI / INTERVAL HISTORY:   64M PMH ACC/AHA stage D HF d/t NICM HM2 LVAD , TV annuloplasty ring (17) as destination therapy due to severe PAD with significant stenosis  SIADH, Depression, CKD-3 with hyperkalemia, past E. coli UTIs.   Generally in good health able to maintain ADLSs euvolemic and normotensive.    sick contacts with Covid-19 at home and imaging findings of RUL opacities  Today  reports generalized weakness since 4 days, chills since 3 days, non-specific abdominal pain and constipation since 2 days with  loose stools today.  Reports sick contacts with Covid-19 at home. Denies  cough, difficulty breathing, chest pain, palpitations admitted for COVID PNA        PAST MEDICAL & SURGICAL HISTORY:  Pleural effusion  Depression  CAD (coronary artery disease)  CHF (congestive heart failure)  S/P ventricular assist device  S/P TVR (tricuspid valve repair)      FAMILY HISTORY:  No pertinent family history in first degree relatives      SOCIAL HISTORY:  Smoking:   Substance Use:   EtOH Use:   Marital Status:   Sexual History:   Occupation:  Recent Travel:  Country of Birth:   Advance Directives:     HOME MEDICATIONS:      Allergies    Allergy Status Unknown    Intolerances          REVIEW OF SYSTEMS:  Constitutional: No fevers, chills, weight loss, weight gain  HEENT: No vision problems, eye pain, nasal congestion, rhinorrhea, sore throat, dysphagia  CV: No chest pain, orthopnea, palpitations  Resp: No cough, dyspnea, wheezing, hemoptysis  GI: No nausea, vomiting, diarrhea, constipation, abdominal pain  : [ ] dysuria [ ] nocturia [ ] hematuria [ ] increased urinary frequency  Musculoskeletal: [ ] back pain [ ] myalgias [ ] arthralgias [ ] fracture  Skin: [ ] rash [ ] itch  Neurological: [ ] headache [ ] dizziness [ ] syncope [ ] weakness [ ] numbness  Psychiatric: [ ] anxiety [ ] depression  Endocrine: [ ] diabetes [ ] thyroid problem  Hematologic/Lymphatic: [ ] anemia [ ] bleeding problem  Allergic/Immunologic: [ ] itchy eyes [ ] nasal discharge [ ] hives [ ] angioedema  [ ] All other systems negative  [ ] Unable to assess ROS because ________    OBJECTIVE:  ICU Vital Signs Last 24 Hrs  T(C): 37.2 (2020 01:06), Max: 37.2 (31 Mar 2020 17:49)  T(F): 99 (2020 01:06), Max: 99 (31 Mar 2020 17:49)  HR: 116 (2020 01:06) (93 - 118)  BP: --  BP(mean): 64 (2020 01:06) (64 - 68)  ABP: --  ABP(mean): --  RR: 25 (2020 01:06) (18 - 25)  SpO2: 100% (2020 01:06) (96% - 100%)        CAPILLARY BLOOD GLUCOSE      POCT Blood Glucose.: 99 mg/dL (31 Mar 2020 21:23)      PHYSICAL EXAM:  General:   HEENT:   Neck:   Chest/Lungs:  Heart:  Abdomen:   Extremities:   Skin:   Neuro:   Psych:     LINES:     HOSPITAL MEDICATIONS:  MEDICATIONS  (STANDING):  aspirin enteric coated 81 milliGRAM(s) Oral daily  cefepime   IVPB      cefepime   IVPB 2000 milliGRAM(s) IV Intermittent every 24 hours  famotidine    Tablet 20 milliGRAM(s) Oral daily  lactated ringers Bolus 250 milliLiter(s) IV Bolus once  metoprolol succinate  milliGRAM(s) Oral daily  metroNIDAZOLE  IVPB      metroNIDAZOLE  IVPB 500 milliGRAM(s) IV Intermittent every 12 hours  mirtazapine 7.5 milliGRAM(s) Oral at bedtime  sodium chloride 0.9% lock flush 3 milliLiter(s) IV Push every 8 hours    MEDICATIONS  (PRN):  acetaminophen   Tablet .. 650 milliGRAM(s) Oral every 6 hours PRN Temp greater or equal to 38C (100.4F), Moderate Pain (4 - 6)      LABS:                        14.4   4.30  )-----------( 112      ( 31 Mar 2020 16:49 )             45.8     Hgb Trend: 14.4<--  03    133<L>  |  100  |  48<H>  ----------------------------<  79  4.9   |  17<L>  |  3.50<H>    Ca    8.3<L>      31 Mar 2020 23:06    TPro  7.7  /  Alb  3.6  /  TBili  0.2  /  DBili  x   /  AST  36  /  ALT  10  /  AlkPhos  58      Creatinine Trend: 3.50<--, 4.20<--, 4.21<--  PT/INR - ( 31 Mar 2020 19:57 )   PT: 41.7 sec;   INR: 3.52 ratio         PTT - ( 31 Mar 2020 19:57 )  PTT:40.8 sec  Urinalysis Basic - ( 31 Mar 2020 23:06 )    Color: Yellow / Appearance: Slightly Turbid / S.021 / pH: x  Gluc: x / Ketone: Small  / Bili: Negative / Urobili: Negative   Blood: x / Protein: 30 mg/dL / Nitrite: Negative   Leuk Esterase: Negative / RBC: 3 /hpf / WBC 1 /HPF   Sq Epi: x / Non Sq Epi: 1 /hpf / Bacteria: Negative      Arterial Blood Gas:   @ 16:41  7.33/34/342/17/100/-7.2  ABG lactate: --    Venous Blood Gas:   @ 19:57  7.30/47/29/22/41  VBG Lactate: 2.3  Venous Blood Gas:   @ 16:49  7.25/56/24/24/27  VBG Lactate: 2.3      MICROBIOLOGY:     RADIOLOGY & ADDITIONAL TESTS:    < from: Xray Chest 1 View AP/PA (20 @ 17:27) >    IMPRESSION:   Subtle linear opacities at the right mid lung field correlate to right upper lobe groundglass opacities better characterized on the same day chest CT.    < end of copied text >    < from: CT Chest No Cont (20 @ 17:17) >    IMPRESSION:     Chest CT:  New nonspecific groundglass opacities in the right upper lobe. CT findings of pulmonary infection/inflammation compatible with many entities (C19V-2).  If COVID-19 is suspected, correlation with RT-PCR is recommended.       CT abdomen and pelvis:  No acute intra-abdominal pathology on this noncontrast exam.    < end of copied text >        ASSESSMENT AND PLAN:  Mr././Ms. is a ___    #Neuro  - AxOx3  - c/w home remeron    #Cardiovascular    HFrEF 2/2 NICM s/p LVAD  - ECHO (2019): mild MR, severe segmental LV sys dysfn, several areas of akinesis, Stage I diastolic dysfxn, RVE w/dec RV sys fxn  - c/w home ASA  - holding home lisinopril and Toprol in s/o sepsis 2/2 COVID PNA  - INR > 3.5, holding home coumadin    #Respiratory  - on 3L NC, wean as tolerated  - CT chest (3/31): New nonspecific groundglass opacities in RUL likely from COVID PNA    #GI/Nutrition  - no active issues    #/Renal  RASHAWN on CKD3  - baseline Cr 1.3-1.7 based on [employer]  - U/A with trace blood, protein 30mg/dl, small ketones  - Cr improving, trend Cr daily    BPH  - holding home meds  #Skin    #ID  COVID PNA  - COVID+ on PCR (3/31)  - CT chest (3/31): New nonspecific groundglass opacities in RUL  - started on Plaquenil/Vit C/thiamine/Azithro  - daily EKGs to monitor for QTc prolongation  - c/w cefepime & flagyl 500mg IV q12h (renally dose)  - check CRP, Ferritin, LDH, d-dimer  - f/u blood cx, urine Cx, GI PCR, Covid-19 PCR  - continue Contact and Airborne isolation precautions    #Endocrine  - no active issues    #Hematologic/DVT ppx    #Ethics      Truman Gould MD  EM/IM PGY1  Pager: [phone] MICU Accept Note    CHIEF COMPLAINT:     HPI / INTERVAL HISTORY:   64M PMH ACC/AHA stage D HF d/t NICM HM2 LVAD , TV annuloplasty ring (17) as destination therapy due to severe PAD with significant stenosis  SIADH, Depression, CKD-3 with hyperkalemia, past E. coli UTIs.   Generally in good health able to maintain ADLSs euvolemic and normotensive.    sick contacts with Covid-19 at home and imaging findings of RUL opacities  Today  reports generalized weakness since 4 days, chills since 3 days, non-specific abdominal pain and constipation since 2 days with  loose stools today.  Reports sick contacts with Covid-19 at home. Denies  cough, difficulty breathing, chest pain, palpitations admitted for COVID PNA        PAST MEDICAL & SURGICAL HISTORY:  Pleural effusion  Depression  CAD (coronary artery disease)  CHF (congestive heart failure)  S/P ventricular assist device  S/P TVR (tricuspid valve repair)      FAMILY HISTORY:  No pertinent family history in first degree relatives      SOCIAL HISTORY:  Smoking:   Substance Use:   EtOH Use:   Marital Status:   Sexual History:   Occupation:  Recent Travel:  Country of Birth:   Advance Directives:     HOME MEDICATIONS:      Allergies    Allergy Status Unknown    Intolerances          REVIEW OF SYSTEMS:  Constitutional: No fevers, chills, weight loss, weight gain  HEENT: No vision problems, eye pain, nasal congestion, rhinorrhea, sore throat, dysphagia  CV: No chest pain, orthopnea, palpitations  Resp: No cough, dyspnea, wheezing, hemoptysis  GI: No nausea, vomiting, diarrhea, constipation, abdominal pain  : [ ] dysuria [ ] nocturia [ ] hematuria [ ] increased urinary frequency  Musculoskeletal: [ ] back pain [ ] myalgias [ ] arthralgias [ ] fracture  Skin: [ ] rash [ ] itch  Neurological: [ ] headache [ ] dizziness [ ] syncope [ ] weakness [ ] numbness  Psychiatric: [ ] anxiety [ ] depression  Endocrine: [ ] diabetes [ ] thyroid problem  Hematologic/Lymphatic: [ ] anemia [ ] bleeding problem  Allergic/Immunologic: [ ] itchy eyes [ ] nasal discharge [ ] hives [ ] angioedema  [ ] All other systems negative  [ ] Unable to assess ROS because ________    OBJECTIVE:  ICU Vital Signs Last 24 Hrs  T(C): 37.2 (2020 01:06), Max: 37.2 (31 Mar 2020 17:49)  T(F): 99 (2020 01:06), Max: 99 (31 Mar 2020 17:49)  HR: 116 (2020 01:06) (93 - 118)  BP: --  BP(mean): 64 (2020 01:06) (64 - 68)  ABP: --  ABP(mean): --  RR: 25 (2020 01:06) (18 - 25)  SpO2: 100% (2020 01:06) (96% - 100%)        CAPILLARY BLOOD GLUCOSE      POCT Blood Glucose.: 99 mg/dL (31 Mar 2020 21:23)      PHYSICAL EXAM:  General:   HEENT:   Neck:   Chest/Lungs:  Heart:  Abdomen:   Extremities:   Skin:   Neuro:   Psych:     LINES:     HOSPITAL MEDICATIONS:  MEDICATIONS  (STANDING):  aspirin enteric coated 81 milliGRAM(s) Oral daily  cefepime   IVPB      cefepime   IVPB 2000 milliGRAM(s) IV Intermittent every 24 hours  famotidine    Tablet 20 milliGRAM(s) Oral daily  lactated ringers Bolus 250 milliLiter(s) IV Bolus once  metoprolol succinate  milliGRAM(s) Oral daily  metroNIDAZOLE  IVPB      metroNIDAZOLE  IVPB 500 milliGRAM(s) IV Intermittent every 12 hours  mirtazapine 7.5 milliGRAM(s) Oral at bedtime  sodium chloride 0.9% lock flush 3 milliLiter(s) IV Push every 8 hours    MEDICATIONS  (PRN):  acetaminophen   Tablet .. 650 milliGRAM(s) Oral every 6 hours PRN Temp greater or equal to 38C (100.4F), Moderate Pain (4 - 6)      LABS:                        14.4   4.30  )-----------( 112      ( 31 Mar 2020 16:49 )             45.8     Hgb Trend: 14.4<--  03    133<L>  |  100  |  48<H>  ----------------------------<  79  4.9   |  17<L>  |  3.50<H>    Ca    8.3<L>      31 Mar 2020 23:06    TPro  7.7  /  Alb  3.6  /  TBili  0.2  /  DBili  x   /  AST  36  /  ALT  10  /  AlkPhos  58      Creatinine Trend: 3.50<--, 4.20<--, 4.21<--  PT/INR - ( 31 Mar 2020 19:57 )   PT: 41.7 sec;   INR: 3.52 ratio         PTT - ( 31 Mar 2020 19:57 )  PTT:40.8 sec  Urinalysis Basic - ( 31 Mar 2020 23:06 )    Color: Yellow / Appearance: Slightly Turbid / S.021 / pH: x  Gluc: x / Ketone: Small  / Bili: Negative / Urobili: Negative   Blood: x / Protein: 30 mg/dL / Nitrite: Negative   Leuk Esterase: Negative / RBC: 3 /hpf / WBC 1 /HPF   Sq Epi: x / Non Sq Epi: 1 /hpf / Bacteria: Negative      Arterial Blood Gas:   @ 16:41  7.33/34/342/17/100/-7.2  ABG lactate: --    Venous Blood Gas:   @ 19:57  7.30/47/29/22/41  VBG Lactate: 2.3  Venous Blood Gas:   @ 16:49  7.25/56/24//  VBG Lactate: 2.3    - COVID PCR (3/31): detected    MICROBIOLOGY:     RADIOLOGY & ADDITIONAL TESTS:    < from: Xray Chest 1 View AP/PA (20 @ 17:27) >    IMPRESSION:   Subtle linear opacities at the right mid lung field correlate to right upper lobe groundglass opacities better characterized on the same day chest CT.    < end of copied text >    < from: CT Chest No Cont (20 @ 17:17) >    IMPRESSION:     Chest CT:  New nonspecific groundglass opacities in the right upper lobe. CT findings of pulmonary infection/inflammation compatible with many entities (C19V-2).  If COVID-19 is suspected, correlation with RT-PCR is recommended.       CT abdomen and pelvis:  No acute intra-abdominal pathology on this noncontrast exam.    < end of copied text >        ASSESSMENT AND PLAN:  64M PMH ACC/AHA stage D HF d/t NICM HM2 LVAD , TV annuloplasty ring (17) as destination therapy due to severe PAD with significant stenosis, SIADH, Depression, CKD-3 with hyperkalemia, past E. coli UTIs p/w  generalized weakness, chills, non-specific abdominal pain and constipation &  loose stools, Covid-19 sick contacts at home c/f COVID+ PNA.      #Neuro  Depression  - c/w home remeron    #Cardiovascular    HFrEF 2/2 NICM s/p LVAD  - ECHO (2019): mild MR, severe segmental LV sys dysfn, several areas of akinesis, Stage I diastolic dysfxn, RVE w/dec RV sys fxn  - c/w home ASA, Toprol  - holding home lisinopril in s/o RASHAWN on CKD  - INR > 3.5, holding home coumadin    #Respiratory  - on 3L NC, wean as tolerated  - CT chest (3/31): New nonspecific groundglass opacities in RUL likely from COVID PNA    #GI/Nutrition  - will GI PCR (diarrhea PTA) with next BM  - CTAP: no acute pathology    #/Renal  RASHAWN on CKD3 w/hyperkalemia  - baseline Cr 1.3-1.7 based on [employer]  - U/A with trace blood, protein 30mg/dl, small ketones  - Cr improving, trend Cr daily    Hyperkalemia on admission s/p insulin/glucose  - now resolved  - monitor w/daily BMPs    BPH  - holding home meds    #Skin  - no active issues    #ID  COVID PNA  - COVID+ on PCR (3/31)  - CT chest (3/31): New nonspecific groundglass opacities in RUL  - started on Plaquenil/Vit C/thiamine/Azithro  - daily EKGs to monitor for QTc prolongation  - c/w cefepime & flagyl 500mg IV q12h (renally dose)  - check CRP, Ferritin, LDH, d-dimer  - f/u blood cx, urine Cx, GI PCR, repeat Covid-19 PCR    #Endocrine  - f/u TSH    #Hematologic/DVT ppx  - mild thrombocytopenia (plt 112 on admission), likely in s/o infection  - trend CBCD  - INR > 3.5, holding home coumadin    #Ethics  Full code MICU Accept Note    CHIEF COMPLAINT:     HPI / INTERVAL HISTORY:   64M PMH ACC/AHA stage D HF d/t NICM HM2 LVAD , TV annuloplasty ring (17) as destination therapy due to severe PAD with significant stenosis  SIADH, Depression, CKD-3 with hyperkalemia, past E. coli UTIs p/w generalized weakness since 4 days, chills since 3 days, non-specific abdominal pain and constipation since 2 days with loose stools. Endorsed sick contacts with Covid-19 at home. Denied  cough, difficulty breathing, chest pain, palpitations.    MICU consulted d/t persistent hypotensive requiring pressors in s/o COVID-19 PNA.        PAST MEDICAL & SURGICAL HISTORY:  Pleural effusion  Depression  CAD (coronary artery disease)  CHF (congestive heart failure)  S/P ventricular assist device  S/P TVR (tricuspid valve repair)      FAMILY HISTORY:  No pertinent family history in first degree relatives      SOCIAL HISTORY:  Smoking:   Substance Use:   EtOH Use:   Marital Status:   Sexual History:   Occupation:  Recent Travel:  Country of Birth:   Advance Directives:     HOME MEDICATIONS:      Allergies    Allergy Status Unknown    Intolerances          REVIEW OF SYSTEMS:  Constitutional: No fevers, chills, weight loss, weight gain  HEENT: No vision problems, eye pain, nasal congestion, rhinorrhea, sore throat, dysphagia  CV: No chest pain, orthopnea, palpitations  Resp: No cough, dyspnea, wheezing, hemoptysis  GI: No nausea, vomiting, diarrhea, constipation, abdominal pain  : [ ] dysuria [ ] nocturia [ ] hematuria [ ] increased urinary frequency  Musculoskeletal: [ ] back pain [ ] myalgias [ ] arthralgias [ ] fracture  Skin: [ ] rash [ ] itch  Neurological: [ ] headache [ ] dizziness [ ] syncope [ ] weakness [ ] numbness  Psychiatric: [ ] anxiety [ ] depression  Endocrine: [ ] diabetes [ ] thyroid problem  Hematologic/Lymphatic: [ ] anemia [ ] bleeding problem  Allergic/Immunologic: [ ] itchy eyes [ ] nasal discharge [ ] hives [ ] angioedema  [ ] All other systems negative  [ ] Unable to assess ROS because ________    OBJECTIVE:  ICU Vital Signs Last 24 Hrs  T(C): 37.2 (2020 01:06), Max: 37.2 (31 Mar 2020 17:49)  T(F): 99 (2020 01:06), Max: 99 (31 Mar 2020 17:49)  HR: 116 (2020 01:06) (93 - 118)  BP: --  BP(mean): 64 (2020 01:06) (64 - 68)  ABP: --  ABP(mean): --  RR: 25 (2020 01:06) (18 - 25)  SpO2: 100% (2020 01:06) (96% - 100%)        CAPILLARY BLOOD GLUCOSE      POCT Blood Glucose.: 99 mg/dL (31 Mar 2020 21:23)      PHYSICAL EXAM:  General:   HEENT:   Neck:   Chest/Lungs:  Heart:  Abdomen:   Extremities:   Skin:   Neuro:   Psych:     LINES:     HOSPITAL MEDICATIONS:  MEDICATIONS  (STANDING):  aspirin enteric coated 81 milliGRAM(s) Oral daily  cefepime   IVPB      cefepime   IVPB 2000 milliGRAM(s) IV Intermittent every 24 hours  famotidine    Tablet 20 milliGRAM(s) Oral daily  lactated ringers Bolus 250 milliLiter(s) IV Bolus once  metoprolol succinate  milliGRAM(s) Oral daily  metroNIDAZOLE  IVPB      metroNIDAZOLE  IVPB 500 milliGRAM(s) IV Intermittent every 12 hours  mirtazapine 7.5 milliGRAM(s) Oral at bedtime  sodium chloride 0.9% lock flush 3 milliLiter(s) IV Push every 8 hours    MEDICATIONS  (PRN):  acetaminophen   Tablet .. 650 milliGRAM(s) Oral every 6 hours PRN Temp greater or equal to 38C (100.4F), Moderate Pain (4 - 6)      LABS:                        14.4   4.30  )-----------( 112      ( 31 Mar 2020 16:49 )             45.8     Hgb Trend: 14.4<--  0331    133<L>  |  100  |  48<H>  ----------------------------<  79  4.9   |  17<L>  |  3.50<H>    Ca    8.3<L>      31 Mar 2020 23:06    TPro  7.7  /  Alb  3.6  /  TBili  0.2  /  DBili  x   /  AST  36  /  ALT  10  /  AlkPhos  58      Creatinine Trend: 3.50<--, 4.20<--, 4.21<--  PT/INR - ( 31 Mar 2020 19:57 )   PT: 41.7 sec;   INR: 3.52 ratio         PTT - ( 31 Mar 2020 19:57 )  PTT:40.8 sec  Urinalysis Basic - ( 31 Mar 2020 23:06 )    Color: Yellow / Appearance: Slightly Turbid / S.021 / pH: x  Gluc: x / Ketone: Small  / Bili: Negative / Urobili: Negative   Blood: x / Protein: 30 mg/dL / Nitrite: Negative   Leuk Esterase: Negative / RBC: 3 /hpf / WBC 1 /HPF   Sq Epi: x / Non Sq Epi: 1 /hpf / Bacteria: Negative      Arterial Blood Gas:   @ 16:41  7.33/34/342/17/100/-7.2  ABG lactate: --    Venous Blood Gas:   @ 19:57  7.30/47/29/22/41  VBG Lactate: 2.3  Venous Blood Gas:   @ 16:49  7.25/56/24//27  VBG Lactate: 2.3    - COVID PCR (3/31): detected    MICROBIOLOGY:     RADIOLOGY & ADDITIONAL TESTS:    < from: Xray Chest 1 View AP/PA (20 @ 17:27) >    IMPRESSION:   Subtle linear opacities at the right mid lung field correlate to right upper lobe groundglass opacities better characterized on the same day chest CT.    < end of copied text >    < from: CT Chest No Cont (20 @ 17:17) >    IMPRESSION:     Chest CT:  New nonspecific groundglass opacities in the right upper lobe. CT findings of pulmonary infection/inflammation compatible with many entities (C19V-2).  If COVID-19 is suspected, correlation with RT-PCR is recommended.       CT abdomen and pelvis:  No acute intra-abdominal pathology on this noncontrast exam.    < end of copied text >        ASSESSMENT AND PLAN:  64M PMH ACC/AHA stage D HF d/t NICM HM2 LVAD , TV annuloplasty ring (17) as destination therapy due to severe PAD with significant stenosis, SIADH, Depression, CKD-3 with hyperkalemia, past E. coli UTIs p/w  generalized weakness, chills, non-specific abdominal pain and constipation &  loose stools, Covid-19 sick contacts at home c/f COVID+ PNA.      #Neuro  Depression  - c/w home remeron    #Cardiovascular    HFrEF 2/2 NICM s/p LVAD  - ECHO (2019): mild MR, severe segmental LV sys dysfn, several areas of akinesis, Stage I diastolic dysfxn, RVE w/dec RV sys fxn  - c/w home ASA, Toprol  - holding home lisinopril in s/o RASHAWN on CKD  - INR > 3.5, holding home coumadin    #Respiratory  - on 3.5L NC, wean as tolerated  - CT chest (3/31): New nonspecific groundglass opacities in RUL likely from COVID-19 PNA    #GI/Nutrition  - will GI PCR (diarrhea PTA) with next BM  - CTAP: no acute pathology    #/Renal  RASHAWN on CKD3 w/hyperkalemia  - baseline Cr 1.3-1.7 based on [employer]  - U/A with trace blood, protein 30mg/dl, small ketones  - Cr improving, trend Cr daily    Hyperkalemia on admission s/p insulin/glucose  - now resolved  - monitor w/daily BMPs    Oliguria  - currently only making 20cc/hr s/p 250cc  bolus LRx2  - will monitor    BPH  - holding home meds    #Skin  - no active issues    #ID  COVID-19 PNA  - COVID-19+ on PCR (3/31)  - CT chest (3/31): New nonspecific groundglass opacities in RUL  - started on Plaquenil/Vit C/thiamine/Azithro on   - daily EKGs to monitor for QTc prolongation (422 on admission)  - c/w cefepime & flagyl 500mg IV q12h (renally dose) per ID  - procalc elevated at 0.45  - trend ESR/CRP, Ferritin, LDH, d-dimer, lactate, CK, Trops, T-cell subset,   - f/u blood cx, urine Cx, GI PCR, repeat Covid-19 PCR  - f/u G6PD, T-cell subset, CK    #Endocrine  - f/u TSH    #Hematologic/DVT ppx  - mild thrombocytopenia (plt 112 on admission), likely in s/o infection  - trend CBCD  - INR > 3.5, holding home coumadin    #Ethics  Full code MICU Accept Note    CHIEF COMPLAINT:     HPI / INTERVAL HISTORY:   64M PMH ACC/AHA stage D HF d/t NICM HM2 LVAD , TV annuloplasty ring (17), severe PAD,  SIADH, Depression, CKD-3 with hyperkalemia, past E. coli UTIs p/w generalized weakness since 4 days, chills since 3 days, non-specific abdominal pain and constipation since 2 days with loose stools. Endorsed sick contacts with Covid-19 at home. Denied  cough, difficulty breathing, chest pain, palpitations.    MICU consulted d/t persistent hypotensive likely requiring pressor support likely in s/o sepsis 2/ COVID-19 PNA.    PAST MEDICAL & SURGICAL HISTORY:  Pleural effusion  Depression  CAD (coronary artery disease)  CHF (congestive heart failure)  S/P ventricular assist device  S/P TVR (tricuspid valve repair)      FAMILY HISTORY:  No pertinent family history in first degree relatives      SOCIAL HISTORY:  Smoking: denied  Substance Use: denied  EtOH Use: denied  Marital Status:   Sexual History:   Occupation:  Recent Travel:   Country of Birth:   Advance Directives:     HOME MEDICATIONS:      Allergies    Allergy Status Unknown    Intolerances          REVIEW OF SYSTEMS:  Constitutional: [x] chills, No fevers, weight loss, weight gain  HEENT: No vision problems, eye pain, nasal congestion, rhinorrhea, sore throat, dysphagia  CV: No chest pain, orthopnea, palpitations  Resp: No cough, dyspnea, wheezing, hemoptysis  GI: [x] nausea, [x] vomiting, [x] diarrhea, [x] constipation, [x] abdominal pain  : [ ] dysuria [ ] nocturia [ ] hematuria [ ] increased urinary frequency  Musculoskeletal: [ ] back pain [ ] myalgias [ ] arthralgias [ ] fracture  Skin: [ ] rash [ ] itch  Neurological: [ ] headache [ ] dizziness [ ] syncope [x] weakness [ ] numbness  Psychiatric: [ ] anxiety [x] depression  Endocrine: [ ] diabetes [ ] thyroid problem  Hematologic/Lymphatic: [ ] anemia [ ] bleeding problem  Allergic/Immunologic: [ ] itchy eyes [ ] nasal discharge [ ] hives [ ] angioedema  [x] All other systems negative  [ ] Unable to assess ROS because ________    OBJECTIVE:  ICU Vital Signs Last 24 Hrs  T(C): 37.2 (2020 01:06), Max: 37.2 (31 Mar 2020 17:49)  T(F): 99 (2020 01:06), Max: 99 (31 Mar 2020 17:49)  HR: 116 (2020 01:06) (93 - 118)  BP: --  BP(mean): 64 (2020 01:06) (64 - 68)  ABP: --  ABP(mean): --  RR: 25 (2020 01:06) (18 - 25)  SpO2: 100% (2020 01:06) (96% - 100%)        CAPILLARY BLOOD GLUCOSE      POCT Blood Glucose.: 99 mg/dL (31 Mar 2020 21:23)    Physical Exam:  In accordance with current standards limiting patient contact please refer to the recent ED physical exam    LINES:     HOSPITAL MEDICATIONS:  MEDICATIONS  (STANDING):  aspirin enteric coated 81 milliGRAM(s) Oral daily  cefepime   IVPB      cefepime   IVPB 2000 milliGRAM(s) IV Intermittent every 24 hours  famotidine    Tablet 20 milliGRAM(s) Oral daily  lactated ringers Bolus 250 milliLiter(s) IV Bolus once  metoprolol succinate  milliGRAM(s) Oral daily  metroNIDAZOLE  IVPB      metroNIDAZOLE  IVPB 500 milliGRAM(s) IV Intermittent every 12 hours  mirtazapine 7.5 milliGRAM(s) Oral at bedtime  sodium chloride 0.9% lock flush 3 milliLiter(s) IV Push every 8 hours    MEDICATIONS  (PRN):  acetaminophen   Tablet .. 650 milliGRAM(s) Oral every 6 hours PRN Temp greater or equal to 38C (100.4F), Moderate Pain (4 - 6)      LABS:                        14.4   4.30  )-----------( 112      ( 31 Mar 2020 16:49 )             45.8     Hgb Trend: 14.4<--  03    133<L>  |  100  |  48<H>  ----------------------------<  79  4.9   |  17<L>  |  3.50<H>    Ca    8.3<L>      31 Mar 2020 23:06    TPro  7.7  /  Alb  3.6  /  TBili  0.2  /  DBili  x   /  AST  36  /  ALT  10  /  AlkPhos  58      Creatinine Trend: 3.50<--, 4.20<--, 4.21<--  PT/INR - ( 31 Mar 2020 19:57 )   PT: 41.7 sec;   INR: 3.52 ratio         PTT - ( 31 Mar 2020 19:57 )  PTT:40.8 sec  Urinalysis Basic - ( 31 Mar 2020 23:06 )    Color: Yellow / Appearance: Slightly Turbid / S.021 / pH: x  Gluc: x / Ketone: Small  / Bili: Negative / Urobili: Negative   Blood: x / Protein: 30 mg/dL / Nitrite: Negative   Leuk Esterase: Negative / RBC: 3 /hpf / WBC 1 /HPF   Sq Epi: x / Non Sq Epi: 1 /hpf / Bacteria: Negative      Arterial Blood Gas:   @ 16:41  7.33/34/342/17/100/-7.2  ABG lactate: --    Venous Blood Gas:   @ 19:57  7.30/47/29/22/41  VBG Lactate: 2.3  Venous Blood Gas:   @ 16:49  7.25/56/24//27  VBG Lactate: 2.3    - COVID PCR (3/31): detected    MICROBIOLOGY:     RADIOLOGY & ADDITIONAL TESTS:    < from: Xray Chest 1 View AP/PA (20 @ 17:27) >    IMPRESSION:   Subtle linear opacities at the right mid lung field correlate to right upper lobe groundglass opacities better characterized on the same day chest CT.    < end of copied text >    < from: CT Chest No Cont (20 @ 17:17) >    IMPRESSION:     Chest CT:  New nonspecific groundglass opacities in the right upper lobe. CT findings of pulmonary infection/inflammation compatible with many entities (C19V-2).  If COVID-19 is suspected, correlation with RT-PCR is recommended.       CT abdomen and pelvis:  No acute intra-abdominal pathology on this noncontrast exam.    < end of copied text >        ASSESSMENT AND PLAN:  64M PMH ACC/AHA stage D HF d/t NICM HM2 LVAD , TV annuloplasty ring (17) as destination therapy due to severe PAD with significant stenosis, SIADH, Depression, CKD-3 with hyperkalemia, past E. coli UTIs p/w  generalized weakness, chills, non-specific abdominal pain and constipation &  loose stools, Covid-19 sick contacts at home c/f COVID+ PNA.      #Neuro  Depression  - c/w home remeron    #Cardiovascular    HFrEF 2/2 NICM s/p LVAD  - ECHO (2019): mild MR, severe segmental LV sys dysfn, several areas of akinesis, Stage I diastolic dysfxn, RVE w/dec RV sys fxn  - c/w home ASA, Toprol  - holding home lisinopril in s/o RASHAWN on CKD  - INR > 3.5, holding home coumadin    #Respiratory  - on 3.5L NC, wean as tolerated  - CT chest (3/31): New nonspecific groundglass opacities in RUL likely from COVID-19 PNA    #GI/Nutrition  - will GI PCR (diarrhea PTA) with next BM  - CTAP: no acute pathology    #/Renal  RASHAWN on CKD3 w/hyperkalemia  - baseline Cr 1.3-1.7 based on [employer]  - U/A with trace blood, protein 30mg/dl, small ketones  - Cr improving, trend Cr daily    Hyponatremia  - h/o SIADH  - Fena 0.5% indicative of pre-renal etiology likely from dehydration for vomiting and diarrhea  - Na 131 on admission  - CTM    Hyperkalemia on admission s/p insulin/glucose  - now resolved  - monitor w/daily BMPs    Oliguria  - currently only making 20cc/hr s/p 250cc  bolus LRx2  - will monitor    BPH  - holding home meds    #Skin  - no active issues    #ID  COVID-19 PNA  - COVID-19+ on PCR (3/31), severe sepsis on admission (tachycardic, tachypneic, lactate 2.3, low MAP, RASHAWN)  - CT chest (3/31): New nonspecific groundglass opacities in RUL  - started on Plaquenil/Vit C/thiamine/Azithro on   - daily EKGs to monitor for QTc prolongation (422 on admission)  - c/w cefepime & flagyl 500mg IV q12h (renally dose) per ID  - procalc elevated at 0.45  - trend ESR/CRP, Ferritin, LDH, d-dimer, lactate, CK, Trops, T-cell subset,   - f/u blood cx, urine Cx, GI PCR, repeat Covid-19 PCR  - f/u G6PD, T-cell subset, CK    #Endocrine  - f/u TSH    #Hematologic/DVT ppx  - mild thrombocytopenia (plt 112 on admission), likely in s/o infection  - trend CBCD  - INR > 3.5, holding home coumadin    #Ethics  Full code MICU Accept Note    CHIEF COMPLAINT:     HPI / INTERVAL HISTORY:   64M PMH ACC/AHA stage D HF d/t NICM HM2 LVAD , TV annuloplasty ring (17), severe PAD,  SIADH, Depression, CKD-3 with hyperkalemia, past E. coli UTIs p/w generalized weakness since 4 days, chills since 3 days, non-specific abdominal pain and constipation since 2 days with loose stools. Endorsed sick contacts with Covid-19 at home. Denied  cough, difficulty breathing, chest pain, palpitations.    MICU consulted d/t persistent hypotensive potentially requiring pressor support likely in s/o sepsis 2/ COVID-19 PNA.    PAST MEDICAL & SURGICAL HISTORY:  Pleural effusion  Depression  CAD (coronary artery disease)  CHF (congestive heart failure)  S/P ventricular assist device  S/P TVR (tricuspid valve repair)      FAMILY HISTORY:  No pertinent family history in first degree relatives      SOCIAL HISTORY:  Smoking: denied  Substance Use: denied  EtOH Use: denied  Marital Status:   Sexual History:   Occupation:  Recent Travel:   Country of Birth:   Advance Directives:     HOME MEDICATIONS:      Allergies    Allergy Status Unknown    Intolerances          REVIEW OF SYSTEMS:  Constitutional: [x] chills, No fevers, weight loss, weight gain  HEENT: No vision problems, eye pain, nasal congestion, rhinorrhea, sore throat, dysphagia  CV: No chest pain, orthopnea, palpitations  Resp: No cough, dyspnea, wheezing, hemoptysis  GI: [x] nausea, [x] vomiting, [x] diarrhea, [x] constipation, [x] abdominal pain  : [ ] dysuria [ ] nocturia [ ] hematuria [ ] increased urinary frequency  Musculoskeletal: [ ] back pain [ ] myalgias [ ] arthralgias [ ] fracture  Skin: [ ] rash [ ] itch  Neurological: [ ] headache [ ] dizziness [ ] syncope [x] weakness [ ] numbness  Psychiatric: [ ] anxiety [x] depression  Endocrine: [ ] diabetes [ ] thyroid problem  Hematologic/Lymphatic: [ ] anemia [ ] bleeding problem  Allergic/Immunologic: [ ] itchy eyes [ ] nasal discharge [ ] hives [ ] angioedema  [x] All other systems negative  [ ] Unable to assess ROS because ________    OBJECTIVE:  ICU Vital Signs Last 24 Hrs  T(C): 37.2 (2020 01:06), Max: 37.2 (31 Mar 2020 17:49)  T(F): 99 (2020 01:06), Max: 99 (31 Mar 2020 17:49)  HR: 116 (2020 01:06) (93 - 118)  BP: --  BP(mean): 64 (2020 01:06) (64 - 68)  ABP: --  ABP(mean): --  RR: 25 (2020 01:06) (18 - 25)  SpO2: 100% (2020 01:06) (96% - 100%)        CAPILLARY BLOOD GLUCOSE      POCT Blood Glucose.: 99 mg/dL (31 Mar 2020 21:23)    Physical Exam:  In accordance with current standards limiting patient contact please refer to the recent ED physical exam    LINES:     HOSPITAL MEDICATIONS:  MEDICATIONS  (STANDING):  aspirin enteric coated 81 milliGRAM(s) Oral daily  cefepime   IVPB      cefepime   IVPB 2000 milliGRAM(s) IV Intermittent every 24 hours  famotidine    Tablet 20 milliGRAM(s) Oral daily  lactated ringers Bolus 250 milliLiter(s) IV Bolus once  metoprolol succinate  milliGRAM(s) Oral daily  metroNIDAZOLE  IVPB      metroNIDAZOLE  IVPB 500 milliGRAM(s) IV Intermittent every 12 hours  mirtazapine 7.5 milliGRAM(s) Oral at bedtime  sodium chloride 0.9% lock flush 3 milliLiter(s) IV Push every 8 hours    MEDICATIONS  (PRN):  acetaminophen   Tablet .. 650 milliGRAM(s) Oral every 6 hours PRN Temp greater or equal to 38C (100.4F), Moderate Pain (4 - 6)      LABS:                        14.4   4.30  )-----------( 112      ( 31 Mar 2020 16:49 )             45.8     Hgb Trend: 14.4<--  03    133<L>  |  100  |  48<H>  ----------------------------<  79  4.9   |  17<L>  |  3.50<H>    Ca    8.3<L>      31 Mar 2020 23:06    TPro  7.7  /  Alb  3.6  /  TBili  0.2  /  DBili  x   /  AST  36  /  ALT  10  /  AlkPhos  58      Creatinine Trend: 3.50<--, 4.20<--, 4.21<--  PT/INR - ( 31 Mar 2020 19:57 )   PT: 41.7 sec;   INR: 3.52 ratio         PTT - ( 31 Mar 2020 19:57 )  PTT:40.8 sec  Urinalysis Basic - ( 31 Mar 2020 23:06 )    Color: Yellow / Appearance: Slightly Turbid / S.021 / pH: x  Gluc: x / Ketone: Small  / Bili: Negative / Urobili: Negative   Blood: x / Protein: 30 mg/dL / Nitrite: Negative   Leuk Esterase: Negative / RBC: 3 /hpf / WBC 1 /HPF   Sq Epi: x / Non Sq Epi: 1 /hpf / Bacteria: Negative      Arterial Blood Gas:   @ 16:41  7.33/34/342/17/100/-7.2  ABG lactate: --    Venous Blood Gas:   @ 19:57  7.30/47/29/22/41  VBG Lactate: 2.3  Venous Blood Gas:   @ 16:49  7.25/56/24//27  VBG Lactate: 2.3    - COVID PCR (3/31): detected    MICROBIOLOGY:     RADIOLOGY & ADDITIONAL TESTS:    < from: Xray Chest 1 View AP/PA (20 @ 17:27) >    IMPRESSION:   Subtle linear opacities at the right mid lung field correlate to right upper lobe groundglass opacities better characterized on the same day chest CT.    < end of copied text >    < from: CT Chest No Cont (20 @ 17:17) >    IMPRESSION:     Chest CT:  New nonspecific groundglass opacities in the right upper lobe. CT findings of pulmonary infection/inflammation compatible with many entities (C19V-2).  If COVID-19 is suspected, correlation with RT-PCR is recommended.       CT abdomen and pelvis:  No acute intra-abdominal pathology on this noncontrast exam.    < end of copied text >        ASSESSMENT AND PLAN:  64M PMH ACC/AHA stage D HF d/t NICM HM2 LVAD , TV annuloplasty ring (17) as destination therapy due to severe PAD with significant stenosis, SIADH, Depression, CKD-3 with hyperkalemia, past E. coli UTIs p/w  generalized weakness, chills, non-specific abdominal pain and constipation &  loose stools, Covid-19 sick contacts at home c/f COVID+ PNA.      #Neuro  Depression  - c/w home remeron    #Cardiovascular    HFrEF 2/2 NICM s/p LVAD  - ECHO (2019): mild MR, severe segmental LV sys dysfn, several areas of akinesis, Stage I diastolic dysfxn, RVE w/dec RV sys fxn  - c/w home ASA, Toprol  - holding home lisinopril in s/o RASHAWN on CKD  - INR > 3.5, holding home coumadin    #Respiratory  - on 3.5L NC, wean as tolerated  - CT chest (3/31): New nonspecific groundglass opacities in RUL likely from COVID-19 PNA    #GI/Nutrition  - will GI PCR (diarrhea PTA) with next BM  - CTAP: no acute pathology    #/Renal  RASHAWN on CKD3 w/hyperkalemia  - baseline Cr 1.3-1.7 based on [employer]  - U/A with trace blood, protein 30mg/dl, small ketones  - Cr improving, trend Cr daily    Hyponatremia  - h/o SIADH  - Fena 0.5% indicative of pre-renal etiology likely from dehydration for vomiting and diarrhea  - Na 131 on admission  - CTM    Hyperkalemia on admission s/p insulin/glucose  - now resolved  - monitor w/daily BMPs    Oliguria  - currently only making 20cc/hr s/p 250cc  bolus LRx2  - will monitor    BPH  - holding home meds    #Skin  - no active issues    #ID  COVID-19 PNA  - COVID-19+ on PCR (3/31), severe sepsis on admission (tachycardic, tachypneic, lactate 2.3, low MAP, RASHAWN)  - CT chest (3/31): New nonspecific groundglass opacities in RUL  - started on Plaquenil/Vit C/thiamine/Azithro on   - daily EKGs to monitor for QTc prolongation (422 on admission)  - c/w cefepime & flagyl 500mg IV q12h (renally dose) per ID  - procalc elevated at 0.45  - trend ESR/CRP, Ferritin, LDH, d-dimer, lactate, CK, Trops, T-cell subset,   - f/u blood cx, urine Cx, GI PCR, repeat Covid-19 PCR  - f/u G6PD, T-cell subset, CK    #Endocrine  - f/u TSH    #Hematologic/DVT ppx  - mild thrombocytopenia (plt 112 on admission), likely in s/o infection  - trend CBCD  - INR > 3.5, holding home coumadin    #Ethics  Full code MICU Accept Note    CHIEF COMPLAINT: Chills, weakness, abd pain, diarrhea and vomiting    HPI / INTERVAL HISTORY:   64M PMH ACC/AHA stage D HF d/t NICM, HM2 LVAD , TV annuloplasty ring (17), severe PAD, SIADH, Depression, CKD-3 with hyperkalemia, past E. coli UTIs p/w generalized weakness since 4 days, chills since 3 days, non-specific abdominal pain and constipation since 2 days with loose stools. Endorsed sick contacts with Covid-19 at home. Denied  cough, difficulty breathing, chest pain, palpitations.    MICU consulted d/t persistent hypotensive potentially requiring pressor support likely in s/o sepsis 2/ COVID-19 PNA.    PAST MEDICAL & SURGICAL HISTORY:  Pleural effusion  Depression  CAD (coronary artery disease)  CHF (congestive heart failure)  S/P ventricular assist device  S/P TVR (tricuspid valve repair)      FAMILY HISTORY:  No pertinent family history in first degree relatives      SOCIAL HISTORY:  Smoking: denied  Substance Use: denied  EtOH Use: denied  Marital Status:   Sexual History:   Occupation:  Recent Travel:   Country of Birth:   Advance Directives:     HOME MEDICATIONS:      Allergies    Allergy Status Unknown    Intolerances          REVIEW OF SYSTEMS:  Constitutional: [x] chills, No fevers, weight loss, weight gain  HEENT: No vision problems, eye pain, nasal congestion, rhinorrhea, sore throat, dysphagia  CV: No chest pain, orthopnea, palpitations  Resp: No cough, dyspnea, wheezing, hemoptysis  GI: [x] nausea, [x] vomiting, [x] diarrhea, [x] constipation, [x] abdominal pain  : [ ] dysuria [ ] nocturia [ ] hematuria [ ] increased urinary frequency  Musculoskeletal: [ ] back pain [ ] myalgias [ ] arthralgias [ ] fracture  Skin: [ ] rash [ ] itch  Neurological: [ ] headache [ ] dizziness [ ] syncope [x] weakness [ ] numbness  Psychiatric: [ ] anxiety [x] depression  Endocrine: [ ] diabetes [ ] thyroid problem  Hematologic/Lymphatic: [ ] anemia [ ] bleeding problem  Allergic/Immunologic: [ ] itchy eyes [ ] nasal discharge [ ] hives [ ] angioedema  [x] All other systems negative  [ ] Unable to assess ROS because ________    OBJECTIVE:  ICU Vital Signs Last 24 Hrs  T(C): 37.2 (2020 01:06), Max: 37.2 (31 Mar 2020 17:49)  T(F): 99 (2020 01:06), Max: 99 (31 Mar 2020 17:49)  HR: 116 (2020 01:06) (93 - 118)  BP: --  BP(mean): 64 (2020 01:06) (64 - 68)  ABP: --  ABP(mean): --  RR: 25 (2020 01:06) (18 - 25)  SpO2: 100% (2020 01:06) (96% - 100%)        CAPILLARY BLOOD GLUCOSE      POCT Blood Glucose.: 99 mg/dL (31 Mar 2020 21:23)    Physical Exam:  In accordance with current standards limiting patient contact please refer to the recent ED physical exam    LINES:     HOSPITAL MEDICATIONS:  MEDICATIONS  (STANDING):  aspirin enteric coated 81 milliGRAM(s) Oral daily  cefepime   IVPB      cefepime   IVPB 2000 milliGRAM(s) IV Intermittent every 24 hours  famotidine    Tablet 20 milliGRAM(s) Oral daily  lactated ringers Bolus 250 milliLiter(s) IV Bolus once  metoprolol succinate  milliGRAM(s) Oral daily  metroNIDAZOLE  IVPB      metroNIDAZOLE  IVPB 500 milliGRAM(s) IV Intermittent every 12 hours  mirtazapine 7.5 milliGRAM(s) Oral at bedtime  sodium chloride 0.9% lock flush 3 milliLiter(s) IV Push every 8 hours    MEDICATIONS  (PRN):  acetaminophen   Tablet .. 650 milliGRAM(s) Oral every 6 hours PRN Temp greater or equal to 38C (100.4F), Moderate Pain (4 - 6)      LABS:                        14.4   4.30  )-----------( 112      ( 31 Mar 2020 16:49 )             45.8     Hgb Trend: 14.4<--  0331    133<L>  |  100  |  48<H>  ----------------------------<  79  4.9   |  17<L>  |  3.50<H>    Ca    8.3<L>      31 Mar 2020 23:06    TPro  7.7  /  Alb  3.6  /  TBili  0.2  /  DBili  x   /  AST  36  /  ALT  10  /  AlkPhos  58  03    Creatinine Trend: 3.50<--, 4.20<--, 4.21<--  PT/INR - ( 31 Mar 2020 19:57 )   PT: 41.7 sec;   INR: 3.52 ratio         PTT - ( 31 Mar 2020 19:57 )  PTT:40.8 sec  Urinalysis Basic - ( 31 Mar 2020 23:06 )    Color: Yellow / Appearance: Slightly Turbid / S.021 / pH: x  Gluc: x / Ketone: Small  / Bili: Negative / Urobili: Negative   Blood: x / Protein: 30 mg/dL / Nitrite: Negative   Leuk Esterase: Negative / RBC: 3 /hpf / WBC 1 /HPF   Sq Epi: x / Non Sq Epi: 1 /hpf / Bacteria: Negative      Arterial Blood Gas:   @ 16:41  7.33/34/342/17/100/-7.2  ABG lactate: --    Venous Blood Gas:   @ 19:57  7.30/47/29/22/41  VBG Lactate: 2.3  Venous Blood Gas:   @ 16:49  7.25/56/24/24/27  VBG Lactate: 2.3    - COVID PCR (3/31): detected    MICROBIOLOGY:     RADIOLOGY & ADDITIONAL TESTS:    < from: Xray Chest 1 View AP/PA (20 @ 17:27) >    IMPRESSION:   Subtle linear opacities at the right mid lung field correlate to right upper lobe groundglass opacities better characterized on the same day chest CT.    < end of copied text >    < from: CT Chest No Cont (20 @ 17:17) >    IMPRESSION:     Chest CT:  New nonspecific groundglass opacities in the right upper lobe. CT findings of pulmonary infection/inflammation compatible with many entities (C19V-2).  If COVID-19 is suspected, correlation with RT-PCR is recommended.       CT abdomen and pelvis:  No acute intra-abdominal pathology on this noncontrast exam.    < end of copied text >        ASSESSMENT AND PLAN:  64M PMH ACC/AHA stage D HF d/t NICM HM2 LVAD , TV annuloplasty ring (17) as destination therapy due to severe PAD with significant stenosis, SIADH, Depression, CKD-3 with hyperkalemia, past E. coli UTIs p/w  generalized weakness, chills, non-specific abdominal pain and constipation &  loose stools, Covid-19 sick contacts at home c/f COVID+ PNA.      #Neuro  Depression  - c/w home remeron    #Cardiovascular    HFrEF 2/2 NICM s/p LVAD  - ECHO (2019): mild MR, severe segmental LV sys dysfn, several areas of akinesis, Stage I diastolic dysfxn, RVE w/dec RV sys fxn  - c/w home ASA, Toprol  - holding home lisinopril in s/o RASHAWN on CKD  - INR > 3.5, holding home coumadin    #Respiratory  - on 3.5L NC, wean as tolerated  - CT chest (3/31): New nonspecific groundglass opacities in RUL likely from COVID-19 PNA    #GI/Nutrition  - will GI PCR (diarrhea PTA) with next BM  - CTAP: no acute pathology  - c/w home pepcid    #/Renal  RASHAWN on CKD3 w/hyperkalemia  - baseline Cr 1.3-1.7 based on [employer]  - Hyperkalemia on admission s/p insulin/glucose  - U/A with trace blood, protein 30mg/dl, small ketones  - Cr improving, trend BMP daily    Mild Hyponatremia, Na 131 on admission  - h/o SIADH  - Fena 0.5% indicative of pre-renal etiology likely in s/o dehydration for vomiting and diarrhea  - CTM    Oliguria  - currently only making 20cc/hr s/p 250cc  bolus LRx2  - CTM    BPH  - holding home meds in s/o HoTN    #Skin  - no active issues    #ID  COVID-19 PNA  - COVID-19+ on PCR (3/31), severe sepsis on admission (tachycardic, tachypneic, lactate 2.3, low MAP, RASHAWN)  - CT chest (3/31): New nonspecific groundglass opacities in RUL  - started on Plaquenil/Vit C/thiamine/Azithro on   - daily EKGs to monitor for QTc prolongation (422 on admission)  - c/w cefepime & flagyl 500mg IV q12h (renally dose) per ID  - procalc elevated at 0.45  - trend ESR/CRP, Ferritin, LDH, d-dimer, lactate, CK, Trops, T-cell subset,   - f/u blood cx, urine Cx, GI PCR, repeat Covid-19 PCR  - f/u G6PD, T-cell subset, CK    #Endocrine  - f/u TSH    #Hematologic/DVT ppx  - mild thrombocytopenia (plt 112 on admission), likely in s/o infection  - trend CBCD  - INR > 3.5, holding home coumadin    #Ethics  Full code    Anayeli Turner MD  PGY1, Internal Medicine  Pager [facility] [phone]/[facility] [id number] MICU Accept Note    CHIEF COMPLAINT: Chills, weakness, abd pain, diarrhea and vomiting    HPI / INTERVAL HISTORY:   64M PMH ACC/AHA stage D HF d/t NICM, HM2 LVAD , TV annuloplasty ring (17), severe PAD, SIADH, Depression, CKD-3 with hyperkalemia, past E. coli UTIs p/w generalized weakness since 4 days, chills since 3 days, non-specific abdominal pain and constipation since 2 days with loose stools. Endorsed sick contacts with Covid-19 at home. Denied  cough, difficulty breathing, chest pain, palpitations.    MICU consulted d/t persistent hypotensive potentially requiring pressor support likely in s/o sepsis 2/ COVID-19 PNA.    PAST MEDICAL & SURGICAL HISTORY:  Pleural effusion  Depression  CAD (coronary artery disease)  CHF (congestive heart failure)  S/P ventricular assist device  S/P TVR (tricuspid valve repair)      FAMILY HISTORY:  No pertinent family history in first degree relatives      SOCIAL HISTORY:  Smoking: denied  Substance Use: denied  EtOH Use: denied  Marital Status:   Sexual History:   Occupation:  Recent Travel:   Country of Birth:   Advance Directives:     HOME MEDICATIONS:      Allergies    Allergy Status Unknown    Intolerances          REVIEW OF SYSTEMS:  Constitutional: [x] chills, No fevers, weight loss, weight gain  HEENT: No vision problems, eye pain, nasal congestion, rhinorrhea, sore throat, dysphagia  CV: No chest pain, orthopnea, palpitations  Resp: No cough, dyspnea, wheezing, hemoptysis  GI: [x] nausea, [x] vomiting, [x] diarrhea, [x] constipation, [x] abdominal pain  : [ ] dysuria [ ] nocturia [ ] hematuria [ ] increased urinary frequency  Musculoskeletal: [ ] back pain [ ] myalgias [ ] arthralgias [ ] fracture  Skin: [ ] rash [ ] itch  Neurological: [ ] headache [ ] dizziness [ ] syncope [x] weakness [ ] numbness  Psychiatric: [ ] anxiety [x] depression  Endocrine: [ ] diabetes [ ] thyroid problem  Hematologic/Lymphatic: [ ] anemia [ ] bleeding problem  Allergic/Immunologic: [ ] itchy eyes [ ] nasal discharge [ ] hives [ ] angioedema  [x] All other systems negative  [ ] Unable to assess ROS because ________    OBJECTIVE:  ICU Vital Signs Last 24 Hrs  T(C): 37.2 (2020 01:06), Max: 37.2 (31 Mar 2020 17:49)  T(F): 99 (2020 01:06), Max: 99 (31 Mar 2020 17:49)  HR: 116 (2020 01:06) (93 - 118)  BP: --  BP(mean): 64 (2020 01:06) (64 - 68)  ABP: --  ABP(mean): --  RR: 25 (2020 01:06) (18 - 25)  SpO2: 100% (2020 01:06) (96% - 100%)        CAPILLARY BLOOD GLUCOSE      POCT Blood Glucose.: 99 mg/dL (31 Mar 2020 21:23)    Physical Exam:  In accordance with current standards limiting patient contact please refer to the recent ED physical exam    LINES:     HOSPITAL MEDICATIONS:  MEDICATIONS  (STANDING):  aspirin enteric coated 81 milliGRAM(s) Oral daily  cefepime   IVPB      cefepime   IVPB 2000 milliGRAM(s) IV Intermittent every 24 hours  famotidine    Tablet 20 milliGRAM(s) Oral daily  lactated ringers Bolus 250 milliLiter(s) IV Bolus once  metoprolol succinate  milliGRAM(s) Oral daily  metroNIDAZOLE  IVPB      metroNIDAZOLE  IVPB 500 milliGRAM(s) IV Intermittent every 12 hours  mirtazapine 7.5 milliGRAM(s) Oral at bedtime  sodium chloride 0.9% lock flush 3 milliLiter(s) IV Push every 8 hours    MEDICATIONS  (PRN):  acetaminophen   Tablet .. 650 milliGRAM(s) Oral every 6 hours PRN Temp greater or equal to 38C (100.4F), Moderate Pain (4 - 6)      LABS:                        14.4   4.30  )-----------( 112      ( 31 Mar 2020 16:49 )             45.8     Hgb Trend: 14.4<--  0331    133<L>  |  100  |  48<H>  ----------------------------<  79  4.9   |  17<L>  |  3.50<H>    Ca    8.3<L>      31 Mar 2020 23:06    TPro  7.7  /  Alb  3.6  /  TBili  0.2  /  DBili  x   /  AST  36  /  ALT  10  /  AlkPhos  58  03    Creatinine Trend: 3.50<--, 4.20<--, 4.21<--  PT/INR - ( 31 Mar 2020 19:57 )   PT: 41.7 sec;   INR: 3.52 ratio         PTT - ( 31 Mar 2020 19:57 )  PTT:40.8 sec  Urinalysis Basic - ( 31 Mar 2020 23:06 )    Color: Yellow / Appearance: Slightly Turbid / S.021 / pH: x  Gluc: x / Ketone: Small  / Bili: Negative / Urobili: Negative   Blood: x / Protein: 30 mg/dL / Nitrite: Negative   Leuk Esterase: Negative / RBC: 3 /hpf / WBC 1 /HPF   Sq Epi: x / Non Sq Epi: 1 /hpf / Bacteria: Negative      Arterial Blood Gas:   @ 16:41  7.33/34/342/17/100/-7.2  ABG lactate: --    Venous Blood Gas:   @ 19:57  7.30/47/29/22/41  VBG Lactate: 2.3  Venous Blood Gas:   @ 16:49  7.25/56/24/24/27  VBG Lactate: 2.3    - COVID PCR (3/31): detected    MICROBIOLOGY:     RADIOLOGY & ADDITIONAL TESTS:    < from: Xray Chest 1 View AP/PA (20 @ 17:27) >    IMPRESSION:   Subtle linear opacities at the right mid lung field correlate to right upper lobe groundglass opacities better characterized on the same day chest CT.    < end of copied text >    < from: CT Chest No Cont (20 @ 17:17) >    IMPRESSION:     Chest CT:  New nonspecific groundglass opacities in the right upper lobe. CT findings of pulmonary infection/inflammation compatible with many entities (C19V-2).  If COVID-19 is suspected, correlation with RT-PCR is recommended.       CT abdomen and pelvis:  No acute intra-abdominal pathology on this noncontrast exam.    < end of copied text >        ASSESSMENT AND PLAN:  64M PMH ACC/AHA stage D HF d/t NICM HM2 LVAD , TV annuloplasty ring (17) as destination therapy due to severe PAD with significant stenosis, SIADH, Depression, CKD-3 with hyperkalemia, past E. coli UTIs p/w  generalized weakness, chills, non-specific abdominal pain and constipation &  loose stools, Covid-19 sick contacts at home a/f COVID+ PNA.      #Neuro  Depression  - c/w home remeron    #Cardiovascular    HFrEF 2/2 NICM s/p LVAD  - ECHO (2019): mild MR, severe segmental LV sys dysfn, several areas of akinesis, Stage I diastolic dysfxn, RVE w/dec RV sys fxn  - c/w home ASA, Toprol  - holding home lisinopril in s/o RASHAWN on CKD  - INR > 3.5, holding home coumadin    #Respiratory  - on 3.5L NC, wean as tolerated  - CT chest (3/31): New nonspecific groundglass opacities in RUL likely from COVID-19 PNA    #GI/Nutrition  - will GI PCR (diarrhea PTA) with next BM  - CTAP: no acute pathology  - c/w home pepcid    #/Renal  RASHAWN on CKD3 w/hyperkalemia  - baseline Cr 1.3-1.7 based on [employer]  - Hyperkalemia on admission s/p insulin/glucose  - U/A with trace blood, protein 30mg/dl, small ketones  - Cr improving, trend BMP daily    Mild Hyponatremia, Na 131 on admission  - h/o SIADH  - Fena 0.5% indicative of pre-renal etiology likely in s/o dehydration for vomiting and diarrhea  - CTM    Oliguria  - currently only making 20cc/hr s/p 250cc  bolus LRx2  - CTM    BPH  - holding home meds in s/o HoTN    #Skin  - no active issues    #ID  COVID-19 PNA  - COVID-19+ on PCR (3/31), severe sepsis on admission (tachycardic, tachypneic, lactate 2.3, low MAP, RASHAWN)  - CT chest (3/31): New nonspecific groundglass opacities in RUL  - started on Plaquenil/Vit C/thiamine/Azithro on   - daily EKGs to monitor for QTc prolongation (422 on admission)  - c/w cefepime & flagyl 500mg IV q12h (renally dose) per ID  - procalc elevated at 0.45  - trend ESR/CRP, Ferritin, LDH, d-dimer, lactate, CK, Trops, T-cell subset,   - f/u blood cx, urine Cx, GI PCR, repeat Covid-19 PCR  - f/u G6PD, T-cell subset, CK    #Endocrine  - f/u TSH    #Hematologic/DVT ppx  - mild thrombocytopenia (plt 112 on admission), likely in s/o infection  - trend CBCD  - INR > 3.5, holding home coumadin    #Ethics  Full code    Anayeli Turner MD  PGY1, Internal Medicine  Pager [facility] [phone]/[facility] [id number] MICU Accept Note    CHIEF COMPLAINT: Chills, weakness, abd pain, diarrhea and vomiting    HPI / INTERVAL HISTORY:   64M PMH ACC/AHA stage D HF d/t NICM, HM2 LVAD , TV annuloplasty ring (17), severe PAD, SIADH, Depression, CKD-3 with hyperkalemia, past E. coli UTIs p/w generalized weakness since 4 days, chills since 3 days, non-specific abdominal pain and constipation since 2 days with loose stools. Endorsed sick contacts with Covid-19 at home. Denied  cough, difficulty breathing, chest pain, palpitations.    In ED, tachycardic to 118, tachypneic to 25, lactate 2.3 on VBG and HoTN with MAP as low as 60. s/p 250 cc bolus LR x2, CTX 2gx2 and LEA [id number]  COVID-19 positive on PCR. CT chest w/ground glass opacities in RUL. Seen by ID who recommended CTX & LEA along with standard COVID-19 medications and labs.    MICU consulted d/t hypotension potentially requiring pressor support likely in s/o severe sepsis 2/2 COVID-19 PNA.    PAST MEDICAL & SURGICAL HISTORY:  Pleural effusion  Depression  CAD (coronary artery disease)  CHF (congestive heart failure)  S/P ventricular assist device  S/P TVR (tricuspid valve repair)      FAMILY HISTORY:  No pertinent family history in first degree relatives      SOCIAL HISTORY:  Smoking: denied  Substance Use: denied  EtOH Use: denied  Marital Status:   Sexual History:   Occupation:  Recent Travel:   Country of Birth:   Advance Directives:     HOME MEDICATIONS:      Allergies    Allergy Status Unknown    Intolerances          REVIEW OF SYSTEMS:  Constitutional: [x] chills, No fevers, weight loss, weight gain  HEENT: No vision problems, eye pain, nasal congestion, rhinorrhea, sore throat, dysphagia  CV: No chest pain, orthopnea, palpitations  Resp: No cough, dyspnea, wheezing, hemoptysis  GI: [x] nausea, [x] vomiting, [x] diarrhea, [x] constipation, [x] abdominal pain  : [ ] dysuria [ ] nocturia [ ] hematuria [ ] increased urinary frequency  Musculoskeletal: [ ] back pain [ ] myalgias [ ] arthralgias [ ] fracture  Skin: [ ] rash [ ] itch  Neurological: [ ] headache [ ] dizziness [ ] syncope [x] weakness [ ] numbness  Psychiatric: [ ] anxiety [x] depression  Endocrine: [ ] diabetes [ ] thyroid problem  Hematologic/Lymphatic: [ ] anemia [ ] bleeding problem  Allergic/Immunologic: [ ] itchy eyes [ ] nasal discharge [ ] hives [ ] angioedema  [x] All other systems negative  [ ] Unable to assess ROS because ________    OBJECTIVE:  ICU Vital Signs Last 24 Hrs  T(C): 37.2 (2020 01:06), Max: 37.2 (31 Mar 2020 17:49)  T(F): 99 (2020 01:06), Max: 99 (31 Mar 2020 17:49)  HR: 116 (2020 01:06) (93 - 118)  BP: --  BP(mean): 64 (2020 01:06) (64 - 68)  ABP: --  ABP(mean): --  RR: 25 (2020 01:06) (18 - 25)  SpO2: 100% (2020 01:06) (96% - 100%)        CAPILLARY BLOOD GLUCOSE      POCT Blood Glucose.: 99 mg/dL (31 Mar 2020 21:23)    Physical Exam:  In accordance with current standards limiting patient contact please refer to the recent ED physical exam    LINES:     HOSPITAL MEDICATIONS:  MEDICATIONS  (STANDING):  aspirin enteric coated 81 milliGRAM(s) Oral daily  cefepime   IVPB      cefepime   IVPB 2000 milliGRAM(s) IV Intermittent every 24 hours  famotidine    Tablet 20 milliGRAM(s) Oral daily  lactated ringers Bolus 250 milliLiter(s) IV Bolus once  metoprolol succinate  milliGRAM(s) Oral daily  metroNIDAZOLE  IVPB      metroNIDAZOLE  IVPB 500 milliGRAM(s) IV Intermittent every 12 hours  mirtazapine 7.5 milliGRAM(s) Oral at bedtime  sodium chloride 0.9% lock flush 3 milliLiter(s) IV Push every 8 hours    MEDICATIONS  (PRN):  acetaminophen   Tablet .. 650 milliGRAM(s) Oral every 6 hours PRN Temp greater or equal to 38C (100.4F), Moderate Pain (4 - 6)      LABS:                        14.4   4.30  )-----------( 112      ( 31 Mar 2020 16:49 )             45.8     Hgb Trend: 14.4<--  0331    133<L>  |  100  |  48<H>  ----------------------------<  79  4.9   |  17<L>  |  3.50<H>    Ca    8.3<L>      31 Mar 2020 23:06    TPro  7.7  /  Alb  3.6  /  TBili  0.2  /  DBili  x   /  AST  36  /  ALT  10  /  AlkPhos  58      Creatinine Trend: 3.50<--, 4.20<--, 4.21<--  PT/INR - ( 31 Mar 2020 19:57 )   PT: 41.7 sec;   INR: 3.52 ratio         PTT - ( 31 Mar 2020 19:57 )  PTT:40.8 sec  Urinalysis Basic - ( 31 Mar 2020 23:06 )    Color: Yellow / Appearance: Slightly Turbid / S.021 / pH: x  Gluc: x / Ketone: Small  / Bili: Negative / Urobili: Negative   Blood: x / Protein: 30 mg/dL / Nitrite: Negative   Leuk Esterase: Negative / RBC: 3 /hpf / WBC 1 /HPF   Sq Epi: x / Non Sq Epi: 1 /hpf / Bacteria: Negative      Arterial Blood Gas:   @ 16:41  7.33/34/342/17/100/-7.2  ABG lactate: --    Venous Blood Gas:   @ 19:57  7.30/47/29/22/41  VBG Lactate: 2.3  Venous Blood Gas:   @ 16:49  7.25/56/24/24/27  VBG Lactate: 2.3    - COVID PCR (3/31): detected    MICROBIOLOGY:     RADIOLOGY & ADDITIONAL TESTS:    < from: Xray Chest 1 View AP/PA (20 @ 17:27) >    IMPRESSION:   Subtle linear opacities at the right mid lung field correlate to right upper lobe groundglass opacities better characterized on the same day chest CT.    < end of copied text >    < from: CT Chest No Cont (20 @ 17:17) >    IMPRESSION:     Chest CT:  New nonspecific groundglass opacities in the right upper lobe. CT findings of pulmonary infection/inflammation compatible with many entities (C19V-2).  If COVID-19 is suspected, correlation with RT-PCR is recommended.       CT abdomen and pelvis:  No acute intra-abdominal pathology on this noncontrast exam.    < end of copied text >        ASSESSMENT AND PLAN:  64M PMH ACC/AHA stage D HF d/t NICM HM2 LVAD , TV annuloplasty ring (17) as destination therapy due to severe PAD with significant stenosis, SIADH, Depression, CKD-3 with hyperkalemia, past E. coli UTIs p/w  generalized weakness, chills, non-specific abdominal pain and constipation &  loose stools, Covid-19 sick contacts at home a/f COVID+ PNA.      #Neuro  Depression  - c/w home remeron    #Cardiovascular    HFrEF 2/2 NICM s/p LVAD  - ECHO (2019): mild MR, severe segmental LV sys dysfn, several areas of akinesis, Stage I diastolic dysfxn, RVE w/dec RV sys fxn  - c/w home ASA, Toprol  - holding home lisinopril in s/o RASHAWN on CKD  - INR > 3.5, holding home coumadin    #Respiratory  - on 3.5L NC, wean as tolerated  - CT chest (3/31): New nonspecific groundglass opacities in RUL likely from COVID-19 PNA    #GI/Nutrition  - will GI PCR (diarrhea PTA) with next BM  - CTAP: no acute pathology  - c/w home pepcid    #/Renal  RASHAWN on CKD3 w/hyperkalemia  - baseline Cr 1.3-1.7 based on [employer]  - Hyperkalemia on admission s/p insulin/glucose  - U/A with trace blood, protein 30mg/dl, small ketones  - Cr improving, trend BMP daily    Mild Hyponatremia, Na 131 on admission  - h/o SIADH  - Fena 0.5% indicative of pre-renal etiology likely in s/o dehydration for vomiting and diarrhea  - CTM    Oliguria  - currently only making 20cc/hr s/p 250cc  bolus LRx2  - CTM    BPH  - holding home meds in s/o HoTN    #Skin  - no active issues    #ID  COVID-19 PNA  - COVID-19+ on PCR (3/31), severe sepsis on admission (tachycardic, tachypneic, lactate 2.3, low MAP, RASHAWN)  - CT chest (3/31): New nonspecific groundglass opacities in RUL  - started on Plaquenil/Vit C/thiamine/Azithro on   - daily EKGs to monitor for QTc prolongation (422 on admission)  - c/w cefepime & flagyl 500mg IV q12h (renally dose) per ID  - procalc elevated at 0.45  - trend ESR/CRP, Ferritin, LDH, d-dimer, lactate, CK, Trops, T-cell subset,   - f/u blood cx, urine Cx, GI PCR, repeat Covid-19 PCR  - f/u G6PD, T-cell subset, CK    #Endocrine  - f/u TSH    #Hematologic/DVT ppx  - mild thrombocytopenia (plt 112 on admission), likely in s/o infection  - trend CBCD  - INR > 3.5, holding home coumadin    #Ethics  Full code    Anayeli Turner MD  PGY1, Internal Medicine  Pager [facility] [phone]/[facility] [id number] MICU Accept Note    CHIEF COMPLAINT: Chills, weakness, abd pain, diarrhea and vomiting    HPI / INTERVAL HISTORY:   64M PMH ACC/AHA stage D HF d/t NICM, HM2 LVAD , TV annuloplasty ring (17), severe PAD, SIADH, Depression, CKD-3 with hyperkalemia, past E. coli UTIs p/w generalized weakness since 4 days, chills since 3 days, non-specific abdominal pain and constipation since 2 days with loose stools. Endorsed sick contacts with Covid-19 at home. Denied  cough, difficulty breathing, chest pain, palpitations.    In ED, tachycardic to 118 with 3 short runs of WCT, tachypneic to 25, lactate 2.3 on VBG and HoTN with MAP as low as 60.   s/p 250 cc bolus LR x2, CTX 2gx2 and LEA [id number]  Hyperkalemia on admission s/p insulin/glucose  COVID-19 positive on PCR. CT chest w/ground glass opacities in RUL.   Seen by ID who recommended CTX & LEA along with standard COVID-19 medications and labs.    MICU consulted d/t hypotension potentially requiring pressor support likely in s/o severe sepsis 2/2 COVID-19 PNA.    PAST MEDICAL & SURGICAL HISTORY:  Pleural effusion  Depression  CAD (coronary artery disease)  CHF (congestive heart failure)  S/P ventricular assist device  S/P TVR (tricuspid valve repair)      FAMILY HISTORY:  No pertinent family history in first degree relatives      SOCIAL HISTORY:  Smoking: denied  Substance Use: denied  EtOH Use: denied  Marital Status:   Sexual History:   Occupation:  Recent Travel:   Country of Birth:   Advance Directives:     HOME MEDICATIONS:      Allergies    Allergy Status Unknown    Intolerances          REVIEW OF SYSTEMS:  Constitutional: [x] chills, No fevers, weight loss, weight gain  HEENT: No vision problems, eye pain, nasal congestion, rhinorrhea, sore throat, dysphagia  CV: No chest pain, orthopnea, palpitations  Resp: No cough, dyspnea, wheezing, hemoptysis  GI: [x] nausea, [x] vomiting, [x] diarrhea, [x] constipation, [x] abdominal pain  : [ ] dysuria [ ] nocturia [ ] hematuria [ ] increased urinary frequency  Musculoskeletal: [ ] back pain [ ] myalgias [ ] arthralgias [ ] fracture  Skin: [ ] rash [ ] itch  Neurological: [ ] headache [ ] dizziness [ ] syncope [x] weakness [ ] numbness  Psychiatric: [ ] anxiety [x] depression  Endocrine: [ ] diabetes [ ] thyroid problem  Hematologic/Lymphatic: [ ] anemia [ ] bleeding problem  Allergic/Immunologic: [ ] itchy eyes [ ] nasal discharge [ ] hives [ ] angioedema  [x] All other systems negative  [ ] Unable to assess ROS because ________    OBJECTIVE:  ICU Vital Signs Last 24 Hrs  T(C): 37.2 (2020 01:06), Max: 37.2 (31 Mar 2020 17:49)  T(F): 99 (2020 01:06), Max: 99 (31 Mar 2020 17:49)  HR: 116 (2020 01:06) (93 - 118)  BP: --  BP(mean): 64 (2020 01:06) (64 - 68)  ABP: --  ABP(mean): --  RR: 25 (2020 01:06) (18 - 25)  SpO2: 100% (2020 01:06) (96% - 100%)        CAPILLARY BLOOD GLUCOSE      POCT Blood Glucose.: 99 mg/dL (31 Mar 2020 21:23)    Physical Exam:  In accordance with current standards limiting patient contact please refer to the recent ED physical exam    LINES:     HOSPITAL MEDICATIONS:  MEDICATIONS  (STANDING):  aspirin enteric coated 81 milliGRAM(s) Oral daily  cefepime   IVPB      cefepime   IVPB 2000 milliGRAM(s) IV Intermittent every 24 hours  famotidine    Tablet 20 milliGRAM(s) Oral daily  lactated ringers Bolus 250 milliLiter(s) IV Bolus once  metoprolol succinate  milliGRAM(s) Oral daily  metroNIDAZOLE  IVPB      metroNIDAZOLE  IVPB 500 milliGRAM(s) IV Intermittent every 12 hours  mirtazapine 7.5 milliGRAM(s) Oral at bedtime  sodium chloride 0.9% lock flush 3 milliLiter(s) IV Push every 8 hours    MEDICATIONS  (PRN):  acetaminophen   Tablet .. 650 milliGRAM(s) Oral every 6 hours PRN Temp greater or equal to 38C (100.4F), Moderate Pain (4 - 6)      LABS:                        14.4   4.30  )-----------( 112      ( 31 Mar 2020 16:49 )             45.8     Hgb Trend: 14.4<--      133<L>  |  100  |  48<H>  ----------------------------<  79  4.9   |  17<L>  |  3.50<H>    Ca    8.3<L>      31 Mar 2020 23:06    TPro  7.7  /  Alb  3.6  /  TBili  0.2  /  DBili  x   /  AST  36  /  ALT  10  /  AlkPhos  58      Creatinine Trend: 3.50<--, 4.20<--, 4.21<--  PT/INR - ( 31 Mar 2020 19:57 )   PT: 41.7 sec;   INR: 3.52 ratio         PTT - ( 31 Mar 2020 19:57 )  PTT:40.8 sec  Urinalysis Basic - ( 31 Mar 2020 23:06 )    Trops 568 --> 683   --> 336  CKMB 12.2 --> 15.5    Lipase 41    ESR 87 <H>  CRP 5.15 <H>  Ferritin 280    Color: Yellow / Appearance: Slightly Turbid / S.021 / pH: x  Gluc: x / Ketone: Small  / Bili: Negative / Urobili: Negative   Blood: x / Protein: 30 mg/dL / Nitrite: Negative   Leuk Esterase: Negative / RBC: 3 /hpf / WBC 1 /HPF   Sq Epi: x / Non Sq Epi: 1 /hpf / Bacteria: Negative      Arterial Blood Gas:   @ 16:41  7.33/34/342/17/100/-7.2  ABG lactate: --    Venous Blood Gas:   @ 19:57  7.30/47/29/22/41  VBG Lactate: 2.3  Venous Blood Gas:   @ 16:49  7.25/56/24/24/27  VBG Lactate: 2.3    - COVID PCR (3/31): detected    MICROBIOLOGY:     RADIOLOGY & ADDITIONAL TESTS:    < from: Xray Chest 1 View AP/PA (20 @ 17:27) >    IMPRESSION:   Subtle linear opacities at the right mid lung field correlate to right upper lobe groundglass opacities better characterized on the same day chest CT.    < end of copied text >    < from: CT Chest No Cont (20 @ 17:17) >    IMPRESSION:     Chest CT:  New nonspecific groundglass opacities in the right upper lobe. CT findings of pulmonary infection/inflammation compatible with many entities (C19V-2).  If COVID-19 is suspected, correlation with RT-PCR is recommended.       CT abdomen and pelvis:  No acute intra-abdominal pathology on this noncontrast exam.    < end of copied text >        ASSESSMENT AND PLAN:  64M PMH ACC/AHA stage D HF d/t NICM HM2 LVAD , TV annuloplasty ring (17) as destination therapy due to severe PAD with significant stenosis, SIADH, Depression, CKD-3 with hyperkalemia, past E. coli UTIs p/w  generalized weakness, chills, non-specific abdominal pain and constipation &  loose stools, Covid-19 sick contacts at home a/f COVID+ PNA.      #Neuro  Depression  - AxOx3  - c/w home Remeron    #Cardiovascular    Elevated Troponins likely in s/o COVID-19 PNA  - all cardiac markers elevated and still rising  - no SIMON/STD/TWIs on admission EKG  - Trend trops until peak    HFrEF 2/2 NICM s/p LVAD  - ECHO (2019): mild MR, severe segmental LV sys dysfn, several areas of akinesis, Stage I diastolic dysfxn, RVE w/dec RV sys fxn  - c/w home ASA, Toprol  - holding home lisinopril in s/o RASHAWN on CKD  - INR > 3.5, holding home coumadin    #Respiratory  - on 3.5L NC, wean as tolerated  - CT chest (3/31): New nonspecific ground glass opacities in RUL likely from COVID-19 PNA    #GI/Nutrition  Abdominal pain of unclear etiology  - will get GI PCR (diarrhea PTA) with next BM  - CTAP: no acute pathology  - lipase WNL (emesis PTA)  - c/w cefepime & flagyl 500mg IV q12h (renally dose) per ID  - c/w home pepcid    #/Renal  RASHAWN on CKD3 w/hyperkalemia  - baseline Cr 1.3-1.7 based on [employer]  - Hyperkalemia on admission s/p insulin/glucose  - U/A with trace blood, protein 30mg/dl, small ketones  - Cr improving, trend BMP daily    Mild Hyponatremia, Na 131 on admission  - h/o SIADH  - Fena 0.5% indicative of pre-renal etiology likely in s/o dehydration for vomiting and diarrhea  - CTM    Oliguria likely in s/o dehydration for vomiting and diarrhea  - currently only making 20cc/hr s/p 250cc bolus LRx2  - CTM    BPH  - holding home meds in s/o HoTN    #Skin  - no active issues    #ID  COVID-19 PNA  - COVID-19+ on PCR (3/31), severe sepsis on admission (tachycardic, tachypneic, lactate 2.3, low MAP, RASHAWN)  - CT chest (3/31): New nonspecific groundglass opacities in RUL  - will start Plaquenil/Vit C/thiamine/Azithro  - daily EKGs to monitor for QTc prolongation (422 on admission)  - procalc elevated at 0.45  - trend ESR/CRP, Ferritin, LDH, d-dimer, lactate, CK, Trops, T-cell subset,   - f/u blood cx, urine Cx, GI PCR, repeat Covid-19 PCR  - f/u G6PD, T-cell subset, CK    #Endocrine  - f/u TSH    #Hematologic/DVT ppx  - mild thrombocytopenia (plt 112 on admission), likely in s/o infection  - trend CBCD  - INR > 3.5, holding home coumadin    #Ethics  Full code    Anayeli Turner MD  PGY1, Internal Medicine  Pager [facility] [phone]/[facility] [id number] MICU Accept Note    CHIEF COMPLAINT: Chills, weakness, abd pain, diarrhea and vomiting    HPI / INTERVAL HISTORY:   64M PMH ACC/AHA stage D HF d/t NICM s/p HM2 LVAD , TV annuloplasty ring (17), severe PAD, SIADH, BPH, Depression, CKD-3 with hyperkalemia, past E. coli UTIs p/w generalized weakness since 4 days, chills since 3 days, non-specific abdominal pain and constipation since 2 days with loose stools. Endorsed sick contacts with Covid-19 at home. Denied  cough, difficulty breathing, chest pain, palpitations.    In ED, tachycardic to 118 with 3 short runs of WCT, tachypneic to 25, lactate 2.3 on VBG and HoTN with MAP as low as 60.   s/p 250 cc bolus LR x2, CTX 2gx2 and LEA [id number]  Hyperkalemia on admission s/p insulin/glucose  COVID-19 positive on PCR. CT chest w/ground glass opacities in RUL.   Seen by ID who recommended CTX & LEA along with standard COVID-19 medications and labs.    MICU consulted d/t hypotension potentially requiring pressor support likely in s/o severe sepsis 2/2 COVID-19 PNA.    PAST MEDICAL & SURGICAL HISTORY:  Pleural effusion  Depression  CAD (coronary artery disease)  CHF (congestive heart failure)  S/P ventricular assist device  S/P TVR (tricuspid valve repair)      FAMILY HISTORY:  No pertinent family history in first degree relatives      SOCIAL HISTORY:  Smoking: denied  Substance Use: denied  EtOH Use: denied  Marital Status:   Sexual History:   Occupation:  Recent Travel:   Country of Birth:   Advance Directives:     HOME MEDICATIONS:      Allergies    Allergy Status Unknown    Intolerances          REVIEW OF SYSTEMS:  Constitutional: [x] chills, No fevers, weight loss, weight gain  HEENT: No vision problems, eye pain, nasal congestion, rhinorrhea, sore throat, dysphagia  CV: No chest pain, orthopnea, palpitations  Resp: No cough, dyspnea, wheezing, hemoptysis  GI: [x] nausea, [x] vomiting, [x] diarrhea, [x] constipation, [x] abdominal pain  : [ ] dysuria [ ] nocturia [ ] hematuria [ ] increased urinary frequency  Musculoskeletal: [ ] back pain [ ] myalgias [ ] arthralgias [ ] fracture  Skin: [ ] rash [ ] itch  Neurological: [ ] headache [ ] dizziness [ ] syncope [x] weakness [ ] numbness  Psychiatric: [ ] anxiety [x] depression  Endocrine: [ ] diabetes [ ] thyroid problem  Hematologic/Lymphatic: [ ] anemia [ ] bleeding problem  Allergic/Immunologic: [ ] itchy eyes [ ] nasal discharge [ ] hives [ ] angioedema  [x] All other systems negative  [ ] Unable to assess ROS because ________    OBJECTIVE:  ICU Vital Signs Last 24 Hrs  T(C): 37.2 (2020 01:06), Max: 37.2 (31 Mar 2020 17:49)  T(F): 99 (2020 01:06), Max: 99 (31 Mar 2020 17:49)  HR: 116 (2020 01:06) (93 - 118)  BP: --  BP(mean): 64 (2020 01:06) (64 - 68)  ABP: --  ABP(mean): --  RR: 25 (2020 01:06) (18 - 25)  SpO2: 100% (2020 01:06) (96% - 100%)        CAPILLARY BLOOD GLUCOSE      POCT Blood Glucose.: 99 mg/dL (31 Mar 2020 21:23)    Physical Exam:  In accordance with current standards limiting patient contact please refer to the recent ED physical exam    LINES:     HOSPITAL MEDICATIONS:  MEDICATIONS  (STANDING):  aspirin enteric coated 81 milliGRAM(s) Oral daily  cefepime   IVPB      cefepime   IVPB 2000 milliGRAM(s) IV Intermittent every 24 hours  famotidine    Tablet 20 milliGRAM(s) Oral daily  lactated ringers Bolus 250 milliLiter(s) IV Bolus once  metoprolol succinate  milliGRAM(s) Oral daily  metroNIDAZOLE  IVPB      metroNIDAZOLE  IVPB 500 milliGRAM(s) IV Intermittent every 12 hours  mirtazapine 7.5 milliGRAM(s) Oral at bedtime  sodium chloride 0.9% lock flush 3 milliLiter(s) IV Push every 8 hours    MEDICATIONS  (PRN):  acetaminophen   Tablet .. 650 milliGRAM(s) Oral every 6 hours PRN Temp greater or equal to 38C (100.4F), Moderate Pain (4 - 6)      LABS:                        14.4   4.30  )-----------( 112      ( 31 Mar 2020 16:49 )             45.8     Hgb Trend: 14.4<--      133<L>  |  100  |  48<H>  ----------------------------<  79  4.9   |  17<L>  |  3.50<H>    Ca    8.3<L>      31 Mar 2020 23:06    TPro  7.7  /  Alb  3.6  /  TBili  0.2  /  DBili  x   /  AST  36  /  ALT  10  /  AlkPhos  58      Creatinine Trend: 3.50<--, 4.20<--, 4.21<--  PT/INR - ( 31 Mar 2020 19:57 )   PT: 41.7 sec;   INR: 3.52 ratio         PTT - ( 31 Mar 2020 19:57 )  PTT:40.8 sec  Urinalysis Basic - ( 31 Mar 2020 23:06 )    Trops 568 --> 683   --> 336  CKMB 12.2 --> 15.5    Lipase 41    ESR 87 <H>  CRP 5.15 <H>  Ferritin 280    Color: Yellow / Appearance: Slightly Turbid / S.021 / pH: x  Gluc: x / Ketone: Small  / Bili: Negative / Urobili: Negative   Blood: x / Protein: 30 mg/dL / Nitrite: Negative   Leuk Esterase: Negative / RBC: 3 /hpf / WBC 1 /HPF   Sq Epi: x / Non Sq Epi: 1 /hpf / Bacteria: Negative      Arterial Blood Gas:   @ 16:41  7.33/34/342/17/100/-7.2  ABG lactate: --    Venous Blood Gas:   @ 19:57  7.30/47/29/22/41  VBG Lactate: 2.3  Venous Blood Gas:   @ 16:49  7.25/56/24/24/27  VBG Lactate: 2.3    - COVID PCR (3/31): detected    MICROBIOLOGY:     RADIOLOGY & ADDITIONAL TESTS:    < from: Xray Chest 1 View AP/PA (20 @ 17:27) >    IMPRESSION:   Subtle linear opacities at the right mid lung field correlate to right upper lobe groundglass opacities better characterized on the same day chest CT.    < end of copied text >    < from: CT Chest No Cont (03.31.20 @ 17:17) >    IMPRESSION:     Chest CT:  New nonspecific groundglass opacities in the right upper lobe. CT findings of pulmonary infection/inflammation compatible with many entities (C19V-2).  If COVID-19 is suspected, correlation with RT-PCR is recommended.       CT abdomen and pelvis:  No acute intra-abdominal pathology on this noncontrast exam.    < end of copied text >        ASSESSMENT AND PLAN:  64M PMH ACC/AHA stage D HF d/t NICM HM2 LVAD , TV annuloplasty ring (17) as destination therapy due to severe PAD with significant stenosis, BPH, SIADH, Depression, CKD-3 with hyperkalemia, past E. coli UTIs p/w  generalized weakness, chills, non-specific abdominal pain and constipation &  loose stools, Covid-19 sick contacts at home a/f COVID+ PNA.      #Neuro  Depression  - AxOx3  - c/w home Remeron    #Cardiovascular    Elevated Troponins likely in s/o COVID-19 PNA  - all cardiac markers elevated and still rising  - no SIMON/STD/TWIs on admission EKG  - Trend trops until peak    HFrEF 2/2 NICM s/p LVAD  - ECHO (2019): mild MR, severe segmental LV sys dysfn, several areas of akinesis, Stage I diastolic dysfxn, RVE w/dec RV sys fxn  - c/w home ASA, Toprol  - holding home lisinopril in s/o RASHAWN on CKD  - INR > 3.5, holding home coumadin    #Respiratory  - on 3.5L NC, wean as tolerated  - CT chest (3/31): New nonspecific ground glass opacities in RUL likely from COVID-19 PNA    #GI/Nutrition  Abdominal pain of unclear etiology  - will get GI PCR (diarrhea PTA) with next BM  - CTAP: no acute pathology  - lipase WNL (emesis PTA)  - c/w cefepime & flagyl 500mg IV q12h (renally dose) per ID  - c/w home pepcid    #/Renal  RASHAWN on CKD3 w/hyperkalemia  - baseline Cr 1.3-1.7 based on [employer]  - Hyperkalemia on admission s/p insulin/glucose  - U/A with trace blood, protein 30mg/dl, small ketones  - Cr improving, trend BMP daily    Mild Hyponatremia, Na 131 on admission  - h/o SIADH  - Fena 0.5% indicative of pre-renal etiology likely in s/o dehydration for vomiting and diarrhea  - CTM    Oliguria likely in s/o dehydration for vomiting and diarrhea  - currently only making 20cc/hr s/p 250cc bolus LRx2  - CTM    BPH  - holding home meds in s/o HoTN    #Skin  - no active issues    #ID  COVID-19 PNA  - COVID-19+ on PCR (3/31), severe sepsis on admission (tachycardic, tachypneic, lactate 2.3, low MAP, RASHAWN)  - CT chest (3/31): New nonspecific groundglass opacities in RUL  - will start Plaquenil/Vit C/thiamine/Azithro  - daily EKGs to monitor for QTc prolongation (422 on admission)  - procalc elevated at 0.45  - trend ESR/CRP, Ferritin, LDH, d-dimer, lactate, CK, Trops, T-cell subset,   - f/u blood cx, urine Cx, GI PCR, repeat Covid-19 PCR  - f/u G6PD, T-cell subset, CK    #Endocrine  - f/u TSH    #Hematologic/DVT ppx  - mild thrombocytopenia (plt 112 on admission), likely in s/o infection  - trend CBCD  - INR > 3.5, holding home coumadin    #Ethics  Full code    Anayeli Turner MD  PGY1, Internal Medicine  Pager [facility] [phone]/[facility] [id number] MICU Accept Note    CHIEF COMPLAINT: Chills, weakness, abd pain, diarrhea and vomiting    HPI / INTERVAL HISTORY:   64M PMH ACC/AHA stage D HF d/t NICM s/p HM2 LVAD , TV annuloplasty ring (17), severe PAD, SIADH, BPH, Depression, CKD-3 with hyperkalemia, past E. coli UTIs p/w generalized weakness since 4 days, chills since 3 days, non-specific abdominal pain and constipation since 2 days with loose stools. Endorsed sick contacts with Covid-19 at home. Denied  cough, difficulty breathing, chest pain, palpitations.    In ED, tachycardic to 118 with 3 short runs of WCT, tachypneic to 25, lactate 2.3 on VBG and HoTN with MAP as low as 60.   s/p 250 cc bolus LR x2, CTX 2gx2 and LEA [id number]  Hyperkalemia on admission s/p insulin/glucose  COVID-19 positive on PCR. CT chest w/ground glass opacities in RUL.   Seen by ID who recommended CTX & LEA along with standard COVID-19 medications and labs.    MICU consulted d/t hypotension potentially requiring pressor support likely in s/o severe sepsis 2/2 COVID-19 PNA.    PAST MEDICAL & SURGICAL HISTORY:  Pleural effusion  Depression  CAD (coronary artery disease)  CHF (congestive heart failure)  S/P ventricular assist device  S/P TVR (tricuspid valve repair)      FAMILY HISTORY:  No pertinent family history in first degree relatives      SOCIAL HISTORY:  Smoking: denied  Substance Use: denied  EtOH Use: denied  Marital Status:   Sexual History:   Occupation:  Recent Travel:   Country of Birth:   Advance Directives:     HOME MEDICATIONS:      Allergies    Allergy Status Unknown    Intolerances          REVIEW OF SYSTEMS:  Constitutional: [x] chills, No fevers, weight loss, weight gain  HEENT: No vision problems, eye pain, nasal congestion, rhinorrhea, sore throat, dysphagia  CV: No chest pain, orthopnea, palpitations  Resp: No cough, dyspnea, wheezing, hemoptysis  GI: [x] nausea, [x] vomiting, [x] diarrhea, [x] constipation, [x] abdominal pain  : [ ] dysuria [ ] nocturia [ ] hematuria [ ] increased urinary frequency  Musculoskeletal: [ ] back pain [ ] myalgias [ ] arthralgias [ ] fracture  Skin: [ ] rash [ ] itch  Neurological: [ ] headache [ ] dizziness [ ] syncope [x] weakness [ ] numbness  Psychiatric: [ ] anxiety [x] depression  Endocrine: [ ] diabetes [ ] thyroid problem  Hematologic/Lymphatic: [ ] anemia [ ] bleeding problem  Allergic/Immunologic: [ ] itchy eyes [ ] nasal discharge [ ] hives [ ] angioedema  [x] All other systems negative  [ ] Unable to assess ROS because ________    OBJECTIVE:  ICU Vital Signs Last 24 Hrs  T(C): 37.2 (2020 01:06), Max: 37.2 (31 Mar 2020 17:49)  T(F): 99 (2020 01:06), Max: 99 (31 Mar 2020 17:49)  HR: 116 (2020 01:06) (93 - 118)  BP: --  BP(mean): 64 (2020 01:06) (64 - 68)  ABP: --  ABP(mean): --  RR: 25 (2020 01:06) (18 - 25)  SpO2: 100% (2020 01:06) (96% - 100%)        CAPILLARY BLOOD GLUCOSE      POCT Blood Glucose.: 99 mg/dL (31 Mar 2020 21:23)    Physical Exam:  In accordance with current standards limiting patient contact please refer to the recent ED physical exam    LINES:     HOSPITAL MEDICATIONS:  MEDICATIONS  (STANDING):  aspirin enteric coated 81 milliGRAM(s) Oral daily  cefepime   IVPB      cefepime   IVPB 2000 milliGRAM(s) IV Intermittent every 24 hours  famotidine    Tablet 20 milliGRAM(s) Oral daily  lactated ringers Bolus 250 milliLiter(s) IV Bolus once  metoprolol succinate  milliGRAM(s) Oral daily  metroNIDAZOLE  IVPB      metroNIDAZOLE  IVPB 500 milliGRAM(s) IV Intermittent every 12 hours  mirtazapine 7.5 milliGRAM(s) Oral at bedtime  sodium chloride 0.9% lock flush 3 milliLiter(s) IV Push every 8 hours    MEDICATIONS  (PRN):  acetaminophen   Tablet .. 650 milliGRAM(s) Oral every 6 hours PRN Temp greater or equal to 38C (100.4F), Moderate Pain (4 - 6)      LABS:                        14.4   4.30  )-----------( 112      ( 31 Mar 2020 16:49 )             45.8     Hgb Trend: 14.4<--      133<L>  |  100  |  48<H>  ----------------------------<  79  4.9   |  17<L>  |  3.50<H>    Ca    8.3<L>      31 Mar 2020 23:06    TPro  7.7  /  Alb  3.6  /  TBili  0.2  /  DBili  x   /  AST  36  /  ALT  10  /  AlkPhos  58      Creatinine Trend: 3.50<--, 4.20<--, 4.21<--  PT/INR - ( 31 Mar 2020 19:57 )   PT: 41.7 sec;   INR: 3.52 ratio         PTT - ( 31 Mar 2020 19:57 )  PTT:40.8 sec  Urinalysis Basic - ( 31 Mar 2020 23:06 )    Trops 568 --> 683   --> 336  CKMB 12.2 --> 15.5    Lipase 41    ESR 87 <H>  CRP 5.15 <H>  Ferritin 280    Color: Yellow / Appearance: Slightly Turbid / S.021 / pH: x  Gluc: x / Ketone: Small  / Bili: Negative / Urobili: Negative   Blood: x / Protein: 30 mg/dL / Nitrite: Negative   Leuk Esterase: Negative / RBC: 3 /hpf / WBC 1 /HPF   Sq Epi: x / Non Sq Epi: 1 /hpf / Bacteria: Negative      Arterial Blood Gas:   @ 16:41  7.33/34/342/17/100/-7.2  ABG lactate: --    Venous Blood Gas:   @ 19:57  7.30/47/29/22/41  VBG Lactate: 2.3  Venous Blood Gas:   @ 16:49  7.25/56/24/24/27  VBG Lactate: 2.3    - COVID PCR (3/31): detected    MICROBIOLOGY:     RADIOLOGY & ADDITIONAL TESTS:    < from: Xray Chest 1 View AP/PA (20 @ 17:27) >    IMPRESSION:   Subtle linear opacities at the right mid lung field correlate to right upper lobe groundglass opacities better characterized on the same day chest CT.    < end of copied text >    < from: CT Chest No Cont (03.31.20 @ 17:17) >    IMPRESSION:     Chest CT:  New nonspecific groundglass opacities in the right upper lobe. CT findings of pulmonary infection/inflammation compatible with many entities (C19V-2).  If COVID-19 is suspected, correlation with RT-PCR is recommended.       CT abdomen and pelvis:  No acute intra-abdominal pathology on this noncontrast exam.    < end of copied text >        ASSESSMENT AND PLAN:  64M PMH ACC/AHA stage D HF d/t NICM HM2 LVAD , TV annuloplasty ring (17) as destination therapy due to severe PAD with significant stenosis, BPH, SIADH, Depression, CKD-3 with hyperkalemia, past E. coli UTIs p/w  generalized weakness, chills, non-specific abdominal pain and constipation &  loose stools, Covid-19 sick contacts at home a/f COVID+ PNA.      #Neuro  Depression  - AxOx3  - c/w home Remeron    #Cardiovascular    Elevated Troponins likely in s/o COVID-19 PNA  - all cardiac markers elevated and still rising  - no SIMON/STD/TWIs on admission EKG  - Trend trops until peak    HFrEF 2/2 NICM s/p LVAD  - ECHO (2019): mild MR, severe segmental LV sys dysfn, several areas of akinesis, Stage I diastolic dysfxn, RVE w/dec RV sys fxn  - c/w home ASA, Toprol  - holding home lisinopril in s/o RASHAWN on CKD  - INR > 3.5, holding home coumadin    #Respiratory  - on 3.5L NC, wean as tolerated  - CT chest (3/31): New nonspecific ground glass opacities in RUL likely from COVID-19 PNA    #GI/Nutrition  Abdominal pain of unclear etiology  - will get GI PCR (diarrhea PTA) with next BM  - CTAP: no acute pathology  - lipase WNL (emesis PTA)  - c/w cefepime & flagyl 500mg IV q12h (renally dose) per ID  - c/w home pepcid    #/Renal  RASHAWN on CKD3 w/hyperkalemia  - baseline Cr 1.3-1.7 based on [employer]  - Hyperkalemia on admission s/p insulin/glucose  - U/A with trace blood, protein 30mg/dl, small ketones  - Cr improving, trend BMP daily    Mild Hyponatremia, Na 131 on admission  - h/o SIADH  - Fena 0.5% indicative of pre-renal etiology likely in s/o dehydration for vomiting and diarrhea  - CTM    Oliguria likely in s/o dehydration for vomiting and diarrhea  - currently only making 20cc/hr s/p 250cc bolus LRx2  - CTM    BPH  - holding home meds in s/o HoTN    #Skin  - no active issues    #ID  COVID-19 PNA  - COVID-19+ on PCR (3/31), severe sepsis on admission (tachycardic, tachypneic, lactate 2.3, low MAP, RASHAWN)  - CT chest (3/31): New nonspecific groundglass opacities in RUL  - will start Plaquenil/Vit C/thiamine/Azithro  - daily EKGs to monitor for QTc prolongation (422 on admission)  - procalc elevated at 0.45  - trend ESR/CRP, Ferritin, LDH, d-dimer, lactate, CK, Trops, T-cell subset  - f/u blood cx, urine Cx, GI PCR, repeat Covid-19 PCR  - f/u G6PD    #Endocrine  - f/u TSH    #Hematologic/DVT ppx  - mild thrombocytopenia (plts 112 on admission), likely in s/o infection  - trend CBCD  - INR > 3.5, holding home coumadin    #Ethics  Full code    Anayeli Turner MD  PGY1, Internal Medicine  Pager [facility] [phone] [id number] MICU Accept Note    CHIEF COMPLAINT: Chills, weakness, abd pain, diarrhea and vomiting    HPI / INTERVAL HISTORY:   64M PMH ACC/AHA stage D HF d/t NICM s/p HM2 LVAD , TV annuloplasty ring (17), severe PAD, SIADH, BPH, Depression, CKD-3 with hyperkalemia, past E. coli UTIs p/w generalized weakness since 4 days, chills since 3 days, non-specific abdominal pain and constipation since 2 days with loose stools. Endorsed sick contacts with Covid-19 at home. Denied  cough, difficulty breathing, chest pain, palpitations.    In ED, tachycardic to 118 with 3 short runs of WCT, tachypneic to 25, lactate 2.3 on VBG and HoTN with MAP as low as 60 (unclear if readings are correct d/t LVAD).   s/p 250 cc bolus LR x2, CTX 2gx2 and LEA [id number]  Hyperkalemia on admission s/p insulin/glucose  COVID-19 positive on PCR. CT chest w/ground glass opacities in RUL.   Seen by ID who recommended CTX & LEA along with standard COVID-19 labs.    MICU consulted d/t questionable hypotension potentially requiring pressor support likely in s/o severe sepsis 2/2 COVID-19 PNA as well as close monitoring d/t h/o LVAD.    PAST MEDICAL & SURGICAL HISTORY:  Pleural effusion  Depression  CAD (coronary artery disease)  CHF (congestive heart failure)  S/P ventricular assist device  S/P TVR (tricuspid valve repair)      FAMILY HISTORY:  No pertinent family history in first degree relatives      SOCIAL HISTORY:  Smoking: denied  Substance Use: denied  EtOH Use: denied      HOME MEDICATIONS:      Allergies    Allergy Status Unknown    Intolerances          REVIEW OF SYSTEMS:  Constitutional: [x] chills, No fevers, weight loss, weight gain  HEENT: No vision problems, eye pain, nasal congestion, rhinorrhea, sore throat, dysphagia  CV: No chest pain, orthopnea, palpitations  Resp: No cough, dyspnea, wheezing, hemoptysis  GI: [x] nausea, [x] vomiting, [x] diarrhea, [x] constipation, [x] abdominal pain  : [ ] dysuria [ ] nocturia [ ] hematuria [ ] increased urinary frequency  Musculoskeletal: [ ] back pain [ ] myalgias [ ] arthralgias [ ] fracture  Skin: [ ] rash [ ] itch  Neurological: [ ] headache [ ] dizziness [ ] syncope [x] weakness [ ] numbness  Psychiatric: [ ] anxiety [x] depression  Endocrine: [ ] diabetes [ ] thyroid problem  Hematologic/Lymphatic: [ ] anemia [ ] bleeding problem  Allergic/Immunologic: [ ] itchy eyes [ ] nasal discharge [ ] hives [ ] angioedema  [x] All other systems negative  [ ] Unable to assess ROS because ________    OBJECTIVE:  ICU Vital Signs Last 24 Hrs  T(C): 37.2 (2020 01:06), Max: 37.2 (31 Mar 2020 17:49)  T(F): 99 (2020 01:06), Max: 99 (31 Mar 2020 17:49)  HR: 116 (2020 01:06) (93 - 118)  BP: --  BP(mean): 64 (2020 01:06) (64 - 68)  ABP: --  ABP(mean): --  RR: 25 (2020 01:06) (18 - 25)  SpO2: 100% (2020 01:06) (96% - 100%)        CAPILLARY BLOOD GLUCOSE      POCT Blood Glucose.: 99 mg/dL (31 Mar 2020 21:23)    Physical Exam:  In accordance with current standards limiting patient contact please refer to the recent ED physical exam    LINES:     HOSPITAL MEDICATIONS:  MEDICATIONS  (STANDING):  aspirin enteric coated 81 milliGRAM(s) Oral daily  cefepime   IVPB      cefepime   IVPB 2000 milliGRAM(s) IV Intermittent every 24 hours  famotidine    Tablet 20 milliGRAM(s) Oral daily  lactated ringers Bolus 250 milliLiter(s) IV Bolus once  metoprolol succinate  milliGRAM(s) Oral daily  metroNIDAZOLE  IVPB      metroNIDAZOLE  IVPB 500 milliGRAM(s) IV Intermittent every 12 hours  mirtazapine 7.5 milliGRAM(s) Oral at bedtime  sodium chloride 0.9% lock flush 3 milliLiter(s) IV Push every 8 hours    MEDICATIONS  (PRN):  acetaminophen   Tablet .. 650 milliGRAM(s) Oral every 6 hours PRN Temp greater or equal to 38C (100.4F), Moderate Pain (4 - 6)      LABS:                        14.4   4.30  )-----------( 112      ( 31 Mar 2020 16:49 )             45.8     Hgb Trend: 14.4<--      133<L>  |  100  |  48<H>  ----------------------------<  79  4.9   |  17<L>  |  3.50<H>    Ca    8.3<L>      31 Mar 2020 23:06    TPro  7.7  /  Alb  3.6  /  TBili  0.2  /  DBili  x   /  AST  36  /  ALT  10  /  AlkPhos  58      Creatinine Trend: 3.50<--, 4.20<--, 4.21<--  PT/INR - ( 31 Mar 2020 19:57 )   PT: 41.7 sec;   INR: 3.52 ratio         PTT - ( 31 Mar 2020 19:57 )  PTT:40.8 sec  Urinalysis Basic - ( 31 Mar 2020 23:06 )    Trops 568 --> 683   --> 336  CKMB 12.2 --> 15.5    Lipase 41    ESR 87 <H>  CRP 5.15 <H>  Ferritin 280    Color: Yellow / Appearance: Slightly Turbid / S.021 / pH: x  Gluc: x / Ketone: Small  / Bili: Negative / Urobili: Negative   Blood: x / Protein: 30 mg/dL / Nitrite: Negative   Leuk Esterase: Negative / RBC: 3 /hpf / WBC 1 /HPF   Sq Epi: x / Non Sq Epi: 1 /hpf / Bacteria: Negative      Arterial Blood Gas:   @ 16:41  7.33/34/342/17/100/-7.2  ABG lactate: --    Venous Blood Gas:   @ 19:57  7.30/47/29/22/41  VBG Lactate: 2.3  Venous Blood Gas:   @ 16:49  7.25/56/24/24/27  VBG Lactate: 2.3    - COVID PCR (3/31): detected    MICROBIOLOGY:     RADIOLOGY & ADDITIONAL TESTS:    < from: Xray Chest 1 View AP/PA (20 @ 17:27) >    IMPRESSION:   Subtle linear opacities at the right mid lung field correlate to right upper lobe groundglass opacities better characterized on the same day chest CT.    < end of copied text >    < from: CT Chest No Cont (20 @ 17:17) >    IMPRESSION:     Chest CT:  New nonspecific groundglass opacities in the right upper lobe. CT findings of pulmonary infection/inflammation compatible with many entities (C19V-2).  If COVID-19 is suspected, correlation with RT-PCR is recommended.       CT abdomen and pelvis:  No acute intra-abdominal pathology on this noncontrast exam.    < end of copied text >        ASSESSMENT AND PLAN:  64M PMH ACC/AHA stage D HF d/t NICM HM2 LVAD , TV annuloplasty ring (17) as destination therapy due to severe PAD with significant stenosis, BPH, SIADH, Depression, CKD-3 with hyperkalemia, past E. coli UTIs p/w  generalized weakness, chills, non-specific abdominal pain and constipation &  loose stools, Covid-19 sick contacts at home a/f COVID+ PNA.      #Neuro  Depression  - AxOx3  - c/w home Remeron    #Cardiovascular    Elevated Troponins likely in s/o COVID-19 PNA  - all cardiac markers elevated and still rising  - no SIMON/STD/TWIs on admission EKG  - Trend trops until peak    HFrEF 2/2 NICM s/p LVAD  - ECHO (2019): mild MR, severe segmental LV sys dysfn, several areas of akinesis, Stage I diastolic dysfxn, RVE w/dec RV sys fxn  - c/w home ASA, Toprol  - holding home lisinopril in s/o RASHAWN on CKD  - INR > 3.5, holding home coumadin    #Respiratory  - on 3.5L NC, wean as tolerated  - CT chest (3/31): New nonspecific ground glass opacities in RUL likely from COVID-19 PNA    #GI/Nutrition  Abdominal pain of unclear etiology  - will get GI PCR (diarrhea PTA) with next BM  - CTAP: no acute pathology  - lipase WNL (emesis PTA)  - c/w cefepime & flagyl 500mg IV q12h (renally dose) per ID  - c/w home pepcid    #/Renal  RASHAWN on CKD3 w/hyperkalemia likely 2/2 ATN  2/2 COVID-19 PNA  - baseline Cr 1.3-1.7 based on [employer]  - Hyperkalemia on admission s/p insulin/glucose  - U/A with trace blood, protein 30mg/dl, small ketones  - Cr improving, trend BMP daily    Mild Hyponatremia, Na 131 on admission  - h/o SIADH  - Fena 0.5% indicative of pre-renal etiology likely in s/o dehydration for vomiting and diarrhea  - CTM    Oliguria likely 2/2 ATN 2/2 COVID-19 PNA vs dehydration for vomiting and diarrhea  - currently only making 20cc/hr s/p 250cc bolus LRx2  - CTM    BPH  - holding home meds in s/o HoTN    #Skin  - no active issues    #ID  COVID-19 PNA  - COVID-19+ on PCR (3/31), severe sepsis on admission (tachycardic, tachypneic, lactate 2.3, low MAP, RASHAWN)  - CT chest (3/31): New nonspecific groundglass opacities in RUL  - will start Plaquenil/Vit C/thiamine  - daily EKGs to monitor for QTc prolongation (422 on admission)  - procalc elevated at 0.45  - trend ESR/CRP, Ferritin, LDH, d-dimer, lactate, CK, Trops, T-cell subset  - f/u blood cx, urine Cx, GI PCR, repeat Covid-19 PCR  - f/u G6PD    #Endocrine  - f/u TSH (tachy on admission)    #Hematologic/DVT ppx  - mild thrombocytopenia (plts 112 on admission), likely in s/o infection  - trend CBCD  - INR > 3.5, holding home coumadin    #Ethics  Full code    Anayeli Turner MD  PGY1, Internal Medicine  Pager [facility] [phone]/[facility] [id number] MICU Accept Note    CHIEF COMPLAINT: Chills, weakness, abd pain, diarrhea and vomiting    HPI / INTERVAL HISTORY:   64M PMH ACC/AHA stage D HF d/t NICM s/p HM2 LVAD , TV annuloplasty ring (17), severe PAD, SIADH, BPH, Depression, CKD-3 with hyperkalemia, past E. coli UTIs p/w generalized weakness since 4 days, chills since 3 days, non-specific abdominal pain and constipation since 2 days with loose stools. Endorsed sick contacts with Covid-19 at home. Denied  cough, difficulty breathing, chest pain, palpitations.    In ED, tachycardic to 118 with 3 short runs of WCT, tachypneic to 25, lactate 2.3 on VBG and HoTN with MAP as low as 60 (unclear if readings are correct d/t LVAD).   s/p 250 cc bolus LR x2, CTX 2gx2 and LEA [id number]  Hyperkalemia on admission s/p insulin/glucose  COVID-19 positive on PCR. CT chest w/ground glass opacities in RUL.   Seen by ID who recommended CTX & LEA along with standard COVID-19 labs.    MICU consulted d/t questionable hypotension potentially requiring pressor support likely in s/o severe sepsis 2/2 COVID-19 PNA as well as close monitoring d/t h/o LVAD.    PAST MEDICAL & SURGICAL HISTORY:  Pleural effusion  Depression  CAD (coronary artery disease)  CHF (congestive heart failure)  S/P ventricular assist device  S/P TVR (tricuspid valve repair)      FAMILY HISTORY:  No pertinent family history in first degree relatives      SOCIAL HISTORY:  Smoking: denied  Substance Use: denied  EtOH Use: denied      HOME MEDICATIONS:      Allergies    Allergy Status Unknown    Intolerances          REVIEW OF SYSTEMS:  Constitutional: [x] chills, No fevers, weight loss, weight gain  HEENT: No vision problems, eye pain, nasal congestion, rhinorrhea, sore throat, dysphagia  CV: No chest pain, orthopnea, palpitations  Resp: No cough, dyspnea, wheezing, hemoptysis  GI: [x] nausea, [x] vomiting, [x] diarrhea, [x] constipation, [x] abdominal pain  : [ ] dysuria [ ] nocturia [ ] hematuria [ ] increased urinary frequency  Musculoskeletal: [ ] back pain [ ] myalgias [ ] arthralgias [ ] fracture  Skin: [ ] rash [ ] itch  Neurological: [ ] headache [ ] dizziness [ ] syncope [x] weakness [ ] numbness  Psychiatric: [ ] anxiety [x] depression  Endocrine: [ ] diabetes [ ] thyroid problem  Hematologic/Lymphatic: [ ] anemia [ ] bleeding problem  Allergic/Immunologic: [ ] itchy eyes [ ] nasal discharge [ ] hives [ ] angioedema  [x] All other systems negative  [ ] Unable to assess ROS because ________    OBJECTIVE:  ICU Vital Signs Last 24 Hrs  T(C): 37.2 (2020 01:06), Max: 37.2 (31 Mar 2020 17:49)  T(F): 99 (2020 01:06), Max: 99 (31 Mar 2020 17:49)  HR: 116 (2020 01:06) (93 - 118)  BP: --  BP(mean): 64 (2020 01:06) (64 - 68)  ABP: --  ABP(mean): --  RR: 25 (2020 01:06) (18 - 25)  SpO2: 100% (2020 01:06) (96% - 100%)        CAPILLARY BLOOD GLUCOSE      POCT Blood Glucose.: 99 mg/dL (31 Mar 2020 21:23)    Physical Exam:  In accordance with current standards limiting patient contact please refer to the recent ED physical exam    LINES:     HOSPITAL MEDICATIONS:  MEDICATIONS  (STANDING):  aspirin enteric coated 81 milliGRAM(s) Oral daily  cefepime   IVPB      cefepime   IVPB 2000 milliGRAM(s) IV Intermittent every 24 hours  famotidine    Tablet 20 milliGRAM(s) Oral daily  lactated ringers Bolus 250 milliLiter(s) IV Bolus once  metoprolol succinate  milliGRAM(s) Oral daily  metroNIDAZOLE  IVPB      metroNIDAZOLE  IVPB 500 milliGRAM(s) IV Intermittent every 12 hours  mirtazapine 7.5 milliGRAM(s) Oral at bedtime  sodium chloride 0.9% lock flush 3 milliLiter(s) IV Push every 8 hours    MEDICATIONS  (PRN):  acetaminophen   Tablet .. 650 milliGRAM(s) Oral every 6 hours PRN Temp greater or equal to 38C (100.4F), Moderate Pain (4 - 6)      LABS:                        14.4   4.30  )-----------( 112      ( 31 Mar 2020 16:49 )             45.8     Hgb Trend: 14.4<--      133<L>  |  100  |  48<H>  ----------------------------<  79  4.9   |  17<L>  |  3.50<H>    Ca    8.3<L>      31 Mar 2020 23:06    TPro  7.7  /  Alb  3.6  /  TBili  0.2  /  DBili  x   /  AST  36  /  ALT  10  /  AlkPhos  58      Creatinine Trend: 3.50<--, 4.20<--, 4.21<--  PT/INR - ( 31 Mar 2020 19:57 )   PT: 41.7 sec;   INR: 3.52 ratio         PTT - ( 31 Mar 2020 19:57 )  PTT:40.8 sec  Urinalysis Basic - ( 31 Mar 2020 23:06 )    Trops 568 --> 683   --> 336  CKMB 12.2 --> 15.5    Lipase 41    ESR 87 <H>  CRP 5.15 <H>  Ferritin 280    Color: Yellow / Appearance: Slightly Turbid / S.021 / pH: x  Gluc: x / Ketone: Small  / Bili: Negative / Urobili: Negative   Blood: x / Protein: 30 mg/dL / Nitrite: Negative   Leuk Esterase: Negative / RBC: 3 /hpf / WBC 1 /HPF   Sq Epi: x / Non Sq Epi: 1 /hpf / Bacteria: Negative      Arterial Blood Gas:   @ 16:41  7.33/34/342/17/100/-7.2  ABG lactate: --    Venous Blood Gas:   @ 19:57  7.30/47/29/22/41  VBG Lactate: 2.3  Venous Blood Gas:   @ 16:49  7.25/56/24/24/27  VBG Lactate: 2.3    - COVID PCR (3/31): detected    MICROBIOLOGY:     RADIOLOGY & ADDITIONAL TESTS:    < from: Xray Chest 1 View AP/PA (20 @ 17:27) >    IMPRESSION:   Subtle linear opacities at the right mid lung field correlate to right upper lobe groundglass opacities better characterized on the same day chest CT.    < end of copied text >    < from: CT Chest No Cont (20 @ 17:17) >    IMPRESSION:     Chest CT:  New nonspecific groundglass opacities in the right upper lobe. CT findings of pulmonary infection/inflammation compatible with many entities (C19V-2).  If COVID-19 is suspected, correlation with RT-PCR is recommended.       CT abdomen and pelvis:  No acute intra-abdominal pathology on this noncontrast exam.    < end of copied text >        ASSESSMENT AND PLAN:  64M PMH ACC/AHA stage D HF d/t NICM HM2 LVAD , TV annuloplasty ring (17) as destination therapy due to severe PAD with significant stenosis, BPH, SIADH, Depression, CKD-3 with hyperkalemia, past E. coli UTIs p/w  generalized weakness, chills, non-specific abdominal pain and constipation &  loose stools, Covid-19 sick contacts at home a/f COVID+ PNA.      #Neuro  Depression  - AxOx3  - c/w home Remeron    #Cardiovascular    Elevated Troponins likely in s/o COVID-19 PNA  - all cardiac markers elevated and still rising  - no SIMON/STD/TWIs on admission EKG  - Trend trops until peak    HFrEF 2/2 NICM s/p LVAD  - ECHO (2019): mild MR, severe segmental LV sys dysfn, several areas of akinesis, Stage I diastolic dysfxn, RVE w/dec RV sys fxn  - gave 500cc NS bolus per HF recs (LVAD #'s c/f pre-renal etiology)  - c/w home ASA, Toprol  - holding home lisinopril in s/o RASHAWN on CKD  - INR > 3.5, holding home coumadin    #Respiratory  - on 3.5L NC, wean as tolerated  - CT chest (3/31): New nonspecific ground glass opacities in RUL likely from COVID-19 PNA    #GI/Nutrition  Abdominal pain of unclear etiology  - will get GI PCR (diarrhea PTA) with next BM  - CTAP: no acute pathology  - lipase WNL (emesis PTA)  - c/w cefepime & flagyl 500mg IV q12h (renally dose) per ID  - c/w home pepcid    #/Renal  RASHAWN on CKD3 w/hyperkalemia likely 2/2 ATN  2/2 COVID-19 PNA  - baseline Cr 1.3-1.7 based on [employer]  - Hyperkalemia on admission s/p insulin/glucose  - U/A with trace blood, protein 30mg/dl, small ketones  - s/p 250cc LR bolusx2 in ED  - Cr improving, trend BMP daily    Mild Hyponatremia, Na 131 on admission  - h/o SIADH  - Fena 0.5% indicative of pre-renal etiology likely in s/o dehydration for vomiting and diarrhea  - CTM    Oliguria likely 2/2 ATN 2/2 COVID-19 PNA vs dehydration for vomiting and diarrhea  - currently only making 20cc/hr s/p 250cc bolus LRx2  - CTM    BPH  - holding home meds in s/o HoTN    #Skin  - no active issues    #ID  COVID-19 PNA  - COVID-19+ on PCR (3/31), severe sepsis on admission (tachycardic, tachypneic, lactate 2.3, low MAP, RASHAWN)  - CT chest (3/31): New nonspecific groundglass opacities in RUL  - will start Plaquenil/Vit C/thiamine  - daily EKGs to monitor for QTc prolongation (422 on admission)  - procalc elevated at 0.45  - trend ESR/CRP, Ferritin, LDH, d-dimer, lactate, CK, Trops, T-cell subset  - f/u blood cx, urine Cx, GI PCR, repeat Covid-19 PCR  - f/u G6PD    #Endocrine  - f/u TSH (tachy on admission)    #Hematologic/DVT ppx  - mild thrombocytopenia (plts 112 on admission), likely in s/o infection  - trend CBCD  - INR > 3.5, holding home coumadin    #Ethics  Full code    Anayeli Turner MD  PGY1, Internal Medicine  Pager [facility] [phone]/[facility] [id number] MICU Accept Note    CHIEF COMPLAINT: Chills, weakness, abd pain, diarrhea and vomiting    HPI / INTERVAL HISTORY:   64M PMH ACC/AHA stage D HF d/t NICM s/p HM2 LVAD , TV annuloplasty ring (17), severe PAD, SIADH, BPH, Depression, CKD-3 with hyperkalemia, past E. coli UTIs p/w generalized weakness since 4 days, chills since 3 days, non-specific abdominal pain and constipation since 2 days with loose stools. Endorsed sick contacts with Covid-19 at home. Denied  cough, difficulty breathing, chest pain, palpitations.    In ED, tachycardic to 118 with 3 short runs of WCT, tachypneic to 25, lactate 2.3 on VBG and HoTN with MAP as low as 60 (unclear if readings are correct d/t LVAD).   s/p 250 cc bolus LR x2, CTX 2gx2 and LEA [id number]  Hyperkalemia on admission s/p insulin/glucose  COVID-19 positive on PCR. CT chest w/ground glass opacities in RUL.   Seen by ID who recommended CTX & LEA along with standard COVID-19 labs.    MICU consulted d/t questionable hypotension potentially requiring pressor support likely in s/o severe sepsis 2/2 COVID-19 PNA as well as close monitoring d/t h/o LVAD.    PAST MEDICAL & SURGICAL HISTORY:  Pleural effusion  Depression  CAD (coronary artery disease)  CHF (congestive heart failure)  S/P ventricular assist device  S/P TVR (tricuspid valve repair)      FAMILY HISTORY:  No pertinent family history in first degree relatives      SOCIAL HISTORY:  Smoking: denied  Substance Use: denied  EtOH Use: denied      HOME MEDICATIONS:      Allergies    Allergy Status Unknown    Intolerances          REVIEW OF SYSTEMS:  Constitutional: [x] chills, No fevers, weight loss, weight gain  HEENT: No vision problems, eye pain, nasal congestion, rhinorrhea, sore throat, dysphagia  CV: No chest pain, orthopnea, palpitations  Resp: No cough, dyspnea, wheezing, hemoptysis  GI: [x] nausea, [x] vomiting, [x] diarrhea, [x] constipation, [x] abdominal pain  : [ ] dysuria [ ] nocturia [ ] hematuria [ ] increased urinary frequency  Musculoskeletal: [ ] back pain [ ] myalgias [ ] arthralgias [ ] fracture  Skin: [ ] rash [ ] itch  Neurological: [ ] headache [ ] dizziness [ ] syncope [x] weakness [ ] numbness  Psychiatric: [ ] anxiety [x] depression  Endocrine: [ ] diabetes [ ] thyroid problem  Hematologic/Lymphatic: [ ] anemia [ ] bleeding problem  Allergic/Immunologic: [ ] itchy eyes [ ] nasal discharge [ ] hives [ ] angioedema  [x] All other systems negative  [ ] Unable to assess ROS because ________    OBJECTIVE:  ICU Vital Signs Last 24 Hrs  T(C): 37.2 (2020 01:06), Max: 37.2 (31 Mar 2020 17:49)  T(F): 99 (2020 01:06), Max: 99 (31 Mar 2020 17:49)  HR: 116 (2020 01:06) (93 - 118)  BP: --  BP(mean): 64 (2020 01:06) (64 - 68)  ABP: --  ABP(mean): --  RR: 25 (2020 01:06) (18 - 25)  SpO2: 100% (2020 01:06) (96% - 100%)        CAPILLARY BLOOD GLUCOSE      POCT Blood Glucose.: 99 mg/dL (31 Mar 2020 21:23)    Physical Exam:  In accordance with current standards limiting patient contact please refer to the recent ED physical exam    LINES:     HOSPITAL MEDICATIONS:  MEDICATIONS  (STANDING):  aspirin enteric coated 81 milliGRAM(s) Oral daily  cefepime   IVPB      cefepime   IVPB 2000 milliGRAM(s) IV Intermittent every 24 hours  famotidine    Tablet 20 milliGRAM(s) Oral daily  lactated ringers Bolus 250 milliLiter(s) IV Bolus once  metoprolol succinate  milliGRAM(s) Oral daily  metroNIDAZOLE  IVPB      metroNIDAZOLE  IVPB 500 milliGRAM(s) IV Intermittent every 12 hours  mirtazapine 7.5 milliGRAM(s) Oral at bedtime  sodium chloride 0.9% lock flush 3 milliLiter(s) IV Push every 8 hours    MEDICATIONS  (PRN):  acetaminophen   Tablet .. 650 milliGRAM(s) Oral every 6 hours PRN Temp greater or equal to 38C (100.4F), Moderate Pain (4 - 6)      LABS:                        14.4   4.30  )-----------( 112      ( 31 Mar 2020 16:49 )             45.8     Hgb Trend: 14.4<--      133<L>  |  100  |  48<H>  ----------------------------<  79  4.9   |  17<L>  |  3.50<H>    Ca    8.3<L>      31 Mar 2020 23:06    TPro  7.7  /  Alb  3.6  /  TBili  0.2  /  DBili  x   /  AST  36  /  ALT  10  /  AlkPhos  58      Creatinine Trend: 3.50<--, 4.20<--, 4.21<--  PT/INR - ( 31 Mar 2020 19:57 )   PT: 41.7 sec;   INR: 3.52 ratio         PTT - ( 31 Mar 2020 19:57 )  PTT:40.8 sec  Urinalysis Basic - ( 31 Mar 2020 23:06 )    Trops 568 --> 683   --> 336  CKMB 12.2 --> 15.5    Lipase 41    ESR 87 <H>  CRP 5.15 <H>  Ferritin 280    Color: Yellow / Appearance: Slightly Turbid / S.021 / pH: x  Gluc: x / Ketone: Small  / Bili: Negative / Urobili: Negative   Blood: x / Protein: 30 mg/dL / Nitrite: Negative   Leuk Esterase: Negative / RBC: 3 /hpf / WBC 1 /HPF   Sq Epi: x / Non Sq Epi: 1 /hpf / Bacteria: Negative      Arterial Blood Gas:   @ 16:41  7.33/34/342/17/100/-7.2  ABG lactate: --    Venous Blood Gas:   @ 19:57  7.30/47/29/22/41  VBG Lactate: 2.3  Venous Blood Gas:   @ 16:49  7.25/56/24/24/27  VBG Lactate: 2.3    - COVID PCR (3/31): detected    MICROBIOLOGY:     RADIOLOGY & ADDITIONAL TESTS:    < from: Xray Chest 1 View AP/PA (20 @ 17:27) >    IMPRESSION:   Subtle linear opacities at the right mid lung field correlate to right upper lobe groundglass opacities better characterized on the same day chest CT.    < end of copied text >    < from: CT Chest No Cont (20 @ 17:17) >    IMPRESSION:     Chest CT:  New nonspecific groundglass opacities in the right upper lobe. CT findings of pulmonary infection/inflammation compatible with many entities (C19V-2).  If COVID-19 is suspected, correlation with RT-PCR is recommended.       CT abdomen and pelvis:  No acute intra-abdominal pathology on this noncontrast exam.    < end of copied text >        ASSESSMENT AND PLAN:  64M PMH ACC/AHA stage D HF d/t NICM HM2 LVAD , TV annuloplasty ring (17) as destination therapy due to severe PAD with significant stenosis, BPH, SIADH, Depression, CKD-3 with hyperkalemia, past E. coli UTIs p/w  generalized weakness, chills, non-specific abdominal pain and constipation &  loose stools, Covid-19 sick contacts at home a/f COVID+ PNA.      #Neuro  Depression  - AxOx3  - c/w home Remeron    #Cardiovascular    Elevated Troponins likely in s/o COVID-19 PNA  - all cardiac markers elevated and still rising  - no SIMON/STD/TWIs on admission EKG  - Trend trops until peak    HFrEF 2/2 NICM s/p LVAD  - ECHO (2019): mild MR, severe segmental LV sys dysfn, several areas of akinesis, Stage I diastolic dysfxn, RVE w/dec RV sys fxn  - gave 500cc NS bolus per HF recs (LVAD #'s c/f pre-renal etiology)  - c/w home ASA, Toprol  - holding home lisinopril in s/o RASHAWN on CKD  - INR > 3.5, holding home coumadin    #Respiratory  - on 3.5L NC, wean as tolerated  - CT chest (3/31): New nonspecific ground glass opacities in RUL likely from COVID-19 PNA    #GI/Nutrition  Abdominal pain of unclear etiology  - will get GI PCR (diarrhea PTA) with next BM  - CTAP: no acute pathology  - lipase WNL (emesis PTA)  - c/w cefepime & flagyl 500mg IV q12h (renally dose) per ID  - c/w home pepcid    #/Renal  RASHAWN on CKD3 w/hyperkalemia likely 2/2 ATN  2/2 COVID-19 PNA  - baseline Cr 1.3-1.7 based on [employer]  - Hyperkalemia on admission s/p insulin/glucose  - U/A with trace blood, protein 30mg/dl, small ketones  - s/p 250cc LR bolusx2 in ED  - Cr improving, trend BMP daily    Mild Hyponatremia, Na 131 on admission  - h/o SIADH  - Fena 0.5% indicative of pre-renal etiology likely in s/o dehydration for vomiting and diarrhea  - CTM    Oliguria likely 2/2 ATN 2/2 COVID-19 PNA vs dehydration for vomiting and diarrhea  - currently only making 20cc/hr s/p 250cc bolus LRx2  - CTM    BPH  - holding home meds in s/o HoTN    #Skin  - no active issues    #ID  COVID-19 PNA  - COVID-19+ on PCR (3/31), severe sepsis on admission (tachycardic, tachypneic, lactate 2.3, low MAP, RASHAWN)  - CT chest (3/31): New nonspecific groundglass opacities in RUL  - will start Plaquenil/Vit C/thiamine  - daily EKGs to monitor for QTc prolongation (422 on admission)  - procalc elevated at 0.45  - trend ESR/CRP, Ferritin, LDH, d-dimer, lactate, CK, Trops, T-cell subset  - f/u blood cx, urine Cx, GI PCR, repeat Covid-19 PCR  - f/u G6PD    #Endocrine  - f/u TSH (tachy on admission)    #Hematologic/DVT ppx  - mild thrombocytopenia (plts 112 on admission), likely in s/o infection  - trend CBCD  - INR > 3.5, holding home coumadin    #Ethics  Full code    Anayeli Turner MD  PGY1, Internal Medicine  Pager [facility] [phone]/[facility] [phone]    Agree with assessment and plan as outlined above. Volume depletion in setting of LVAD/covid infection.

## 2020-04-01 NOTE — CONSULT NOTE ADULT - ASSESSMENT
Mr. Rasheed is a 64 year old man with ACC/AHA stage D HF due to NICM, s/p HM2 LVAD  in 9/12/17 as destination therapy due to severe peripheral artery disease with significant stenosis. He currently presents with COVID-19 accompanied by abdominal pain, diarrhea and acute renal failure. With volume resuscitation, his renal function is improving and he currently has good urine output. Hemodynamically he is stable without evidence of LVAD malfunction.     Plan discussed with MICU team.

## 2020-04-02 DIAGNOSIS — Z71.89 OTHER SPECIFIED COUNSELING: ICD-10-CM

## 2020-04-02 LAB
ALBUMIN SERPL ELPH-MCNC: 3.6 G/DL — SIGNIFICANT CHANGE UP (ref 3.3–5)
ALBUMIN SERPL ELPH-MCNC: 3.7 G/DL — SIGNIFICANT CHANGE UP (ref 3.3–5)
ALP SERPL-CCNC: 57 U/L — SIGNIFICANT CHANGE UP (ref 40–120)
ALP SERPL-CCNC: 57 U/L — SIGNIFICANT CHANGE UP (ref 40–120)
ALT FLD-CCNC: 11 U/L — SIGNIFICANT CHANGE UP (ref 10–45)
ALT FLD-CCNC: 13 U/L — SIGNIFICANT CHANGE UP (ref 10–45)
ANION GAP SERPL CALC-SCNC: 11 MMOL/L — SIGNIFICANT CHANGE UP (ref 5–17)
ANION GAP SERPL CALC-SCNC: 12 MMOL/L — SIGNIFICANT CHANGE UP (ref 5–17)
APTT BLD: 48.5 SEC — HIGH (ref 27.5–36.3)
APTT BLD: 50.6 SEC — HIGH (ref 27.5–36.3)
AST SERPL-CCNC: 29 U/L — SIGNIFICANT CHANGE UP (ref 10–40)
AST SERPL-CCNC: 32 U/L — SIGNIFICANT CHANGE UP (ref 10–40)
BILIRUB SERPL-MCNC: 0.2 MG/DL — SIGNIFICANT CHANGE UP (ref 0.2–1.2)
BILIRUB SERPL-MCNC: 0.3 MG/DL — SIGNIFICANT CHANGE UP (ref 0.2–1.2)
BUN SERPL-MCNC: 41 MG/DL — HIGH (ref 7–23)
BUN SERPL-MCNC: 44 MG/DL — HIGH (ref 7–23)
CALCIUM SERPL-MCNC: 8.2 MG/DL — LOW (ref 8.4–10.5)
CALCIUM SERPL-MCNC: 8.5 MG/DL — SIGNIFICANT CHANGE UP (ref 8.4–10.5)
CHLORIDE SERPL-SCNC: 102 MMOL/L — SIGNIFICANT CHANGE UP (ref 96–108)
CHLORIDE SERPL-SCNC: 103 MMOL/L — SIGNIFICANT CHANGE UP (ref 96–108)
CO2 SERPL-SCNC: 20 MMOL/L — LOW (ref 22–31)
CO2 SERPL-SCNC: 20 MMOL/L — LOW (ref 22–31)
CREAT SERPL-MCNC: 1.39 MG/DL — HIGH (ref 0.5–1.3)
CREAT SERPL-MCNC: 1.65 MG/DL — HIGH (ref 0.5–1.3)
CRP SERPL-MCNC: 5.61 MG/DL — HIGH (ref 0–0.4)
ERYTHROCYTE [SEDIMENTATION RATE] IN BLOOD: 94 MM/HR — HIGH (ref 0–20)
FERRITIN SERPL-MCNC: 228 NG/ML — SIGNIFICANT CHANGE UP (ref 30–400)
GLUCOSE SERPL-MCNC: 101 MG/DL — HIGH (ref 70–99)
GLUCOSE SERPL-MCNC: 86 MG/DL — SIGNIFICANT CHANGE UP (ref 70–99)
HCT VFR BLD CALC: 39.9 % — SIGNIFICANT CHANGE UP (ref 39–50)
HCT VFR BLD CALC: 40.4 % — SIGNIFICANT CHANGE UP (ref 39–50)
HGB BLD-MCNC: 12.5 G/DL — LOW (ref 13–17)
HGB BLD-MCNC: 12.9 G/DL — LOW (ref 13–17)
INR BLD: 3.37 RATIO — HIGH (ref 0.88–1.16)
INR BLD: 4.27 RATIO — HIGH (ref 0.88–1.16)
LDH SERPL L TO P-CCNC: 320 U/L — HIGH (ref 50–242)
MCHC RBC-ENTMCNC: 28.4 PG — SIGNIFICANT CHANGE UP (ref 27–34)
MCHC RBC-ENTMCNC: 29.3 PG — SIGNIFICANT CHANGE UP (ref 27–34)
MCHC RBC-ENTMCNC: 30.9 GM/DL — LOW (ref 32–36)
MCHC RBC-ENTMCNC: 32.3 GM/DL — SIGNIFICANT CHANGE UP (ref 32–36)
MCV RBC AUTO: 90.5 FL — SIGNIFICANT CHANGE UP (ref 80–100)
MCV RBC AUTO: 91.8 FL — SIGNIFICANT CHANGE UP (ref 80–100)
NRBC # BLD: 0 /100 WBCS — SIGNIFICANT CHANGE UP (ref 0–0)
NRBC # BLD: 0 /100 WBCS — SIGNIFICANT CHANGE UP (ref 0–0)
PHOSPHATE SERPL-MCNC: 3.2 MG/DL — SIGNIFICANT CHANGE UP (ref 2.5–4.5)
PLATELET # BLD AUTO: 82 K/UL — LOW (ref 150–400)
PLATELET # BLD AUTO: 94 K/UL — LOW (ref 150–400)
POTASSIUM SERPL-MCNC: 4.8 MMOL/L — SIGNIFICANT CHANGE UP (ref 3.5–5.3)
POTASSIUM SERPL-MCNC: 5 MMOL/L — SIGNIFICANT CHANGE UP (ref 3.5–5.3)
POTASSIUM SERPL-SCNC: 4.8 MMOL/L — SIGNIFICANT CHANGE UP (ref 3.5–5.3)
POTASSIUM SERPL-SCNC: 5 MMOL/L — SIGNIFICANT CHANGE UP (ref 3.5–5.3)
PROCALCITONIN SERPL-MCNC: 0.11 NG/ML — HIGH (ref 0.02–0.1)
PROT SERPL-MCNC: 7.5 G/DL — SIGNIFICANT CHANGE UP (ref 6–8.3)
PROT SERPL-MCNC: 7.6 G/DL — SIGNIFICANT CHANGE UP (ref 6–8.3)
PROTHROM AB SERPL-ACNC: 40.3 SEC — HIGH (ref 10–12.9)
PROTHROM AB SERPL-ACNC: 50.9 SEC — HIGH (ref 10–13.1)
RBC # BLD: 4.4 M/UL — SIGNIFICANT CHANGE UP (ref 4.2–5.8)
RBC # BLD: 4.41 M/UL — SIGNIFICANT CHANGE UP (ref 4.2–5.8)
RBC # FLD: 13.8 % — SIGNIFICANT CHANGE UP (ref 10.3–14.5)
RBC # FLD: 13.8 % — SIGNIFICANT CHANGE UP (ref 10.3–14.5)
SODIUM SERPL-SCNC: 134 MMOL/L — LOW (ref 135–145)
SODIUM SERPL-SCNC: 134 MMOL/L — LOW (ref 135–145)
WBC # BLD: 3.86 K/UL — SIGNIFICANT CHANGE UP (ref 3.8–10.5)
WBC # BLD: 4.04 K/UL — SIGNIFICANT CHANGE UP (ref 3.8–10.5)
WBC # FLD AUTO: 3.86 K/UL — SIGNIFICANT CHANGE UP (ref 3.8–10.5)
WBC # FLD AUTO: 4.04 K/UL — SIGNIFICANT CHANGE UP (ref 3.8–10.5)

## 2020-04-02 PROCEDURE — 99232 SBSQ HOSP IP/OBS MODERATE 35: CPT

## 2020-04-02 PROCEDURE — 93010 ELECTROCARDIOGRAM REPORT: CPT

## 2020-04-02 PROCEDURE — 93750 INTERROGATION VAD IN PERSON: CPT

## 2020-04-02 PROCEDURE — 99232 SBSQ HOSP IP/OBS MODERATE 35: CPT | Mod: GC

## 2020-04-02 PROCEDURE — 99233 SBSQ HOSP IP/OBS HIGH 50: CPT | Mod: 25

## 2020-04-02 RX ADMIN — CHLORHEXIDINE GLUCONATE 1 APPLICATION(S): 213 SOLUTION TOPICAL at 08:25

## 2020-04-02 RX ADMIN — MIRTAZAPINE 7.5 MILLIGRAM(S): 45 TABLET, ORALLY DISINTEGRATING ORAL at 21:39

## 2020-04-02 RX ADMIN — Medication 650 MILLIGRAM(S): at 10:20

## 2020-04-02 RX ADMIN — Medication 650 MILLIGRAM(S): at 18:11

## 2020-04-02 RX ADMIN — Medication 81 MILLIGRAM(S): at 10:17

## 2020-04-02 RX ADMIN — FAMOTIDINE 20 MILLIGRAM(S): 10 INJECTION INTRAVENOUS at 10:18

## 2020-04-02 RX ADMIN — SODIUM CHLORIDE 3 MILLILITER(S): 9 INJECTION INTRAMUSCULAR; INTRAVENOUS; SUBCUTANEOUS at 06:04

## 2020-04-02 RX ADMIN — SODIUM CHLORIDE 3 MILLILITER(S): 9 INJECTION INTRAMUSCULAR; INTRAVENOUS; SUBCUTANEOUS at 10:18

## 2020-04-02 RX ADMIN — SODIUM CHLORIDE 3 MILLILITER(S): 9 INJECTION INTRAMUSCULAR; INTRAVENOUS; SUBCUTANEOUS at 21:33

## 2020-04-02 RX ADMIN — Medication 200 MILLIGRAM(S): at 21:40

## 2020-04-02 RX ADMIN — Medication 100 MILLIGRAM(S): at 06:05

## 2020-04-02 RX ADMIN — Medication 200 MILLIGRAM(S): at 10:18

## 2020-04-02 NOTE — PROGRESS NOTE ADULT - SUBJECTIVE AND OBJECTIVE BOX
LVAD PROGRESS NOTE     Subjective:  Patient seen and examined resting in bed. ON no issues. Today patient states    Telemetry:      Vital Signs Last 24 Hrs  T(C): 38.3 (04-02-20 @ 10:00), Max: 38.7 (04-01-20 @ 22:30)  T(F): 101 (04-02-20 @ 10:00), Max: 101.6 (04-01-20 @ 22:30)  HR: 102 (04-02-20 @ 10:00) (101 - 116)  BP: --  RR: 18 (04-02-20 @ 10:00) (18 - 22)  SpO2: 96% (04-02-20 @ 10:00) (94% - 100%)             LVAD Parameters:  Flow:  Speed:  PI:  Power:           04-01 @ 07:01  -  04-02 @ 07:00  --------------------------------------------------------  IN: 1050 mL / OUT: 1600 mL / NET: -550 mL    04-02 @ 07:01  -  04-02 @ 11:03  --------------------------------------------------------  IN: 0 mL / OUT: 175 mL / NET: -175 mL          Daily     Daily         CAPILLARY BLOOD GLUCOSE              Drains:     MS         [  ] Drainage:                 L Pleural  [  ]  Drainage:                R Pleural  [  ]  Drainage:    Pacing Wires        [  ]   Settings:                                  Isolated  [  ]    Coumadin    [ ] YES          [  ]      NO         Reason:           Medications:  acetaminophen   Tablet .. 650 milliGRAM(s) Oral every 6 hours PRN  aspirin enteric coated 81 milliGRAM(s) Oral daily  chlorhexidine 4% Liquid 1 Application(s) Topical <User Schedule>  famotidine    Tablet 20 milliGRAM(s) Oral daily  hydroxychloroquine   Oral   hydroxychloroquine 200 milliGRAM(s) Oral every 12 hours  mirtazapine 7.5 milliGRAM(s) Oral at bedtime  sodium chloride 0.9% lock flush 3 milliLiter(s) IV Push every 8 hours        Labs within 24 hours:                        12.5   4.04  )-----------( 94       ( 02 Apr 2020 06:23 )             40.4       04-02    134<L>  |  102  |  41<H>  ----------------------------<  86  5.0   |  20<L>  |  1.39<H>    Ca    8.5      02 Apr 2020 06:23  Phos  3.2     04-02  Mg     2.5     04-01    TPro  7.5  /  Alb  3.7  /  TBili  0.3  /  DBili  x   /  AST  32  /  ALT  13  /  AlkPhos  57  04-02      PT/INR - ( 02 Apr 2020 10:08 )   PT: 50.9 sec;   INR: 4.27 ratio         PTT - ( 02 Apr 2020 10:08 )  PTT:50.6 sec        Plan discussed with heart failure attending LVAD PROGRESS NOTE     Overnight issues: patient had two febrile episodes, Tylenol was given. MAPs were as low as 30, IV fluid was given and MAPs improved.     Vital Signs Last 24 Hrs  T(C): 38.3 (04-02-20 @ 10:00), Max: 38.7 (04-01-20 @ 22:30)  T(F): 101 (04-02-20 @ 10:00), Max: 101.6 (04-01-20 @ 22:30)  HR: 102 (04-02-20 @ 10:00) (101 - 116)  RR: 18 (04-02-20 @ 10:00) (18 - 22)  SpO2: 96% (04-02-20 @ 10:00) (94% - 100%)           LVAD Parameters:  Flow: 4.8  Speed: 8800  PI: 5  Power: 5.3       04-01 @ 07:01  -  04-02 @ 07:00  --------------------------------------------------------  IN: 1050 mL / OUT: 1600 mL / NET: -550 mL    04-02 @ 07:01  -  04-02 @ 11:03  --------------------------------------------------------  IN: 0 mL / OUT: 175 mL / NET: -175 mL      Coumadin    [X ] YES          [  ]      NO         Reason: LVAD, currently holding for elevated INR           Medications:  acetaminophen   Tablet .. 650 milliGRAM(s) Oral every 6 hours PRN  aspirin enteric coated 81 milliGRAM(s) Oral daily  chlorhexidine 4% Liquid 1 Application(s) Topical <User Schedule>  famotidine    Tablet 20 milliGRAM(s) Oral daily  hydroxychloroquine   Oral   hydroxychloroquine 200 milliGRAM(s) Oral every 12 hours  mirtazapine 7.5 milliGRAM(s) Oral at bedtime  sodium chloride 0.9% lock flush 3 milliLiter(s) IV Push every 8 hours      Labs within 24 hours:                        12.5   4.04  )-----------( 94       ( 02 Apr 2020 06:23 )             40.4       134<L>  |  102  |  41<H>  ----------------------------<  86  5.0   |  20<L>  |  1.39<H>    Ca    8.5      02 Apr 2020 06:23  Phos  3.2     04-02  Mg     2.5     04-01    TPro  7.5  /  Alb  3.7  /  TBili  0.3  /  DBili  x   /  AST  32  /  ALT  13  /  AlkPhos  57  04-02    PT/INR - ( 02 Apr 2020 10:08 )   PT: 50.9 sec;   INR: 4.27 ratio      PTT - ( 02 Apr 2020 10:08 )  PTT:50.6 sec        Plan discussed with heart failure attending

## 2020-04-02 NOTE — PROGRESS NOTE ADULT - PROBLEM SELECTOR PLAN 2
Heart Failure following  Continue asa 81 daily  BP medications on hold - will resume if MAPs improved  Holding Coumadin today for elevated INR of 4.27. Will resume Coumadin when fesiable.   Continue to monitor INR daily   CBC BMP LDH PTT INR daily Heart Failure following  Continue asa 81 daily  BP medications on hold - will resume if MAPs improved  Holding Coumadin today for elevated INR of 4.27. Will resume Coumadin when feasible.  Continue to monitor PT/INR daily  Elevated LDH, 320 today. Continue monitor daily LDH levels  Continue daily labs CBC/BMP/Hepatic function/Mg

## 2020-04-02 NOTE — PROGRESS NOTE ADULT - ASSESSMENT
ASSESSMENT:  64/M with PMH CHF s/p LVAD Sept 2017 (on Warfarin), CAD, s/p TVR, SIADH, Depression, CKD-3, past E. coli UTIs  P/w generalized weakness since 4 days; chills since 3 days; non-specific abdominal pain and constipation since 2 days and loose stools today  H/o sick contacts with Covid-19 at home. Imaging concerning for new opacities in RUL, Covid-19 PCR pending.  =========  LVAD with Covid-19 infection  - pt denied cough, upper resp symptoms, fevers. Appears comfortable on RA, able to speak full sentences without dyspnea  - However, he has sick contacts with Covid-19 at home and imaging findings of RUL opacities  - Covid-19 Labs - ; Procalcitonin 0.45 elevated ferritin and CRP    RECOMMENDATIONS:  - continue Hydroxychloroquine  - Continue supplemental O2 and supportive care per primary team  - Continue Contact and Airborne Isolation precautions    ABELARDO Forbes MD  Fellow, Infectious Diseases  Pager: [phone]  After 5pm and on Weekends: Call [phone]

## 2020-04-02 NOTE — PROGRESS NOTE ADULT - SUBJECTIVE AND OBJECTIVE BOX
Follow Up: LVAD with Covid-19 infection    Interval History/ROS:Patient is a 64y old  Male who presents with a chief complaint of Nausea (2020 11:02)  - still noted to have persistent fever spikes  - patient assessed at bedside. C/o persistent constipation, appeared comfortable off O2    Allergies    No Known Allergies    Intolerances        ANTIMICROBIALS:  hydroxychloroquine    hydroxychloroquine 200 every 12 hours      OTHER MEDS:  acetaminophen   Tablet .. 650 milliGRAM(s) Oral every 6 hours PRN  aspirin enteric coated 81 milliGRAM(s) Oral daily  chlorhexidine 4% Liquid 1 Application(s) Topical <User Schedule>  famotidine    Tablet 20 milliGRAM(s) Oral daily  mirtazapine 7.5 milliGRAM(s) Oral at bedtime  sodium chloride 0.9% lock flush 3 milliLiter(s) IV Push every 8 hours      Vital Signs Last 24 Hrs  T(C): 36.9 (2020 14:11), Max: 38.7 (2020 22:30)  T(F): 98.5 (2020 14:11), Max: 101.6 (2020 22:30)  HR: 100 (2020 14:11) (100 - 116)  BP: --  BP(mean): 72 (2020 14:11) (40 - 72)  RR: 20 (2020 14:11) (18 - 20)  SpO2: 97% (2020 14:11) (96% - 100%)    EXAM:  Constitutional: Not in acute distress  Eyes: No icterus. Pupils b/l reactive to light.  Oral cavity: Clear, no lesions  Neck: No neck vein distension noted  RS: Chest clear to auscultation bilaterally. No wheeze/rhonchi/crepitations.  CVS: S1, S2 heard. Regular rate and rhythm. No murmurs/rubs/gallops.  Abdomen: Soft. No guarding/rigidity/tenderness. LVAD site appears clear.  : No acute abnormalities  Extremities: Warm. No pedal edema  Skin: No lesions noted  Vascular: No evidence of phlebitis  Neuro: Alert, oriented to time/place/person                        12.5   4.04  )-----------( 94       ( 2020 06:23 )             40.4       04-02    134<L>  |  102  |  41<H>  ----------------------------<  86  5.0   |  20<L>  |  1.39<H>    Ca    8.5      2020 06:23  Phos  3.2     04-  Mg     2.5         TPro  7.5  /  Alb  3.7  /  TBili  0.3  /  DBili  x   /  AST  32  /  ALT  13  /  AlkPhos  57        Urinalysis Basic - ( 31 Mar 2020 23:06 )    Color: Yellow / Appearance: Slightly Turbid / S.021 / pH: x  Gluc: x / Ketone: Small  / Bili: Negative / Urobili: Negative   Blood: x / Protein: 30 mg/dL / Nitrite: Negative   Leuk Esterase: Negative / RBC: 3 /hpf / WBC 1 /HPF   Sq Epi: x / Non Sq Epi: 1 /hpf / Bacteria: Negative        MICROBIOLOGY:Culture Results:   No growth ( @ 00:56)  Culture Results:   No growth to date. ( @ 21:23)  Culture Results:   No growth to date. ( @ 21:23)      RADIOLOGY:    OTHER INVESTIGATIONS:    ASSESSMENT:    RECOMMENDATIONS:

## 2020-04-02 NOTE — PROGRESS NOTE ADULT - SUBJECTIVE AND OBJECTIVE BOX
Subjective:  - feeling fatigued  - no further episodes of diarrhea  - denies LH/dizziness, CP, palpitations, cough    Medications:  acetaminophen   Tablet .. 650 milliGRAM(s) Oral every 6 hours PRN  aspirin enteric coated 81 milliGRAM(s) Oral daily  chlorhexidine 4% Liquid 1 Application(s) Topical <User Schedule>  famotidine    Tablet 20 milliGRAM(s) Oral daily  hydroxychloroquine   Oral   hydroxychloroquine 200 milliGRAM(s) Oral every 12 hours  mirtazapine 7.5 milliGRAM(s) Oral at bedtime  sodium chloride 0.9% lock flush 3 milliLiter(s) IV Push every 8 hours    Physical Exam:    Vitals:  Vital Signs Last 24 Hours  T(C): 36.9 (04-02-20 @ 14:11), Max: 38.7 (04-01-20 @ 22:30)  HR: 100 (04-02-20 @ 14:11) (100 - 116)  BP: --  RR: 20 (04-02-20 @ 14:11) (18 - 20)  SpO2: 97% (04-02-20 @ 14:11) (96% - 100%)    I&O's Summary    01 Apr 2020 07:01  -  02 Apr 2020 07:00  --------------------------------------------------------  IN: 1050 mL / OUT: 1600 mL / NET: -550 mL    02 Apr 2020 07:01  -  02 Apr 2020 16:21  --------------------------------------------------------  IN: 0 mL / OUT: 375 mL / NET: -375 mL    Tele: SR/ST 90-110s, 18 beats WCT, 6 beats WCT     General: No distress.  HEENT: EOM intact.  Neck: Neck supple. JVP not elevated. No masses  Chest: Clear to auscultation bilaterally  CV: Typical LVAD sounds. No distal pulses  Abdomen: Soft, non-distended, non-tender  Driveline exit site: Clean, dry, and intact  Skin: No rashes or skin breakdown  Neurology: Alert and oriented times three. Sensation intact  Psych: Affect normal    LVAD Interrogation: Heart Mate II  RPMs: 8800  Power: 4.8  Flow: 4.6  PI: 3.8  Event: Multiple PI events   No programming changes were made    Labs:                        12.5   4.04  )-----------( 94       ( 02 Apr 2020 06:23 )             40.4     04-02    134<L>  |  102  |  41<H>  ----------------------------<  86  5.0   |  20<L>  |  1.39<H>    Ca    8.5      02 Apr 2020 06:23  Phos  3.2     04-02  Mg     2.5     04-01    TPro  7.5  /  Alb  3.7  /  TBili  0.3  /  DBili  x   /  AST  32  /  ALT  13  /  AlkPhos  57  04-02    PT/INR - ( 02 Apr 2020 11:09 )   PT: 40.3 sec;   INR: 3.37 ratio         PTT - ( 02 Apr 2020 11:09 )  PTT:48.5 sec  CARDIAC MARKERS ( 31 Mar 2020 19:57 )  x     / x     / 336 U/L / x     / 15.7 ng/mL  CARDIAC MARKERS ( 31 Mar 2020 16:49 )  x     / x     / 318 U/L / x     / 12.2 ng/mL      Creatine Kinase, Serum: 336 U/L (03-31-20 @ 19:57)  Creatine Kinase, Serum: 318 U/L (03-31-20 @ 16:49)  Creatine Kinase, Serum: 336 U/L (03-31-20 @ 19:57)  Creatine Kinase, Serum: 318 U/L (03-31-20 @ 16:49)      Lactate Dehydrogenase, Serum: 319 U/L (04-02 @ 11:09)  Lactate Dehydrogenase, Serum: 320 U/L (04-02 @ 06:23)  Lactate Dehydrogenase, Serum: 328 U/L (04-01 @ 10:26)  Lactate Dehydrogenase, Serum: 337 U/L (03-31 @ 20:00)  Lactate Dehydrogenase, Serum: 612 U/L (03-31 @ 16:49)

## 2020-04-02 NOTE — PROGRESS NOTE ADULT - ATTENDING COMMENTS
Will give additional IV fluids. Monitoring markers of inflammation. Plan discussed in multidisciplinary rounds and with ID team.     Please call me with questions at [phone].

## 2020-04-02 NOTE — PROGRESS NOTE ADULT - ASSESSMENT
Mr. Quispe is a 64 year old man with ACC/AHA stage D HF due to NICM, s/p HM2 LVAD  in 9/12/17 as destination therapy due to severe peripheral artery disease with significant stenosis. He currently presents with COVID-19 accompanied by abdominal pain, diarrhea and acute renal failure.     Hypovolemic with low MAPs requiring further volume resuscitation. His renal function continues to improves. Ongoing fevers, blood cultures NGTD. ID following and he has been started on Plaquenil. Maintaining oxygen saturation in the high 90s with 4L NC.  His LDH is stable. He is without evidence of LVAD malfunction.

## 2020-04-02 NOTE — PROGRESS NOTE ADULT - ASSESSMENT
This a 65 y/o male PMH ACC/AHA stage D HF due to NICM HM2 LVAD, TV annuloplasty ring 9/12/17 as destination therapy due to severe peripheral artery disease with significant stenosis, SIADH, Depression, CKD-3, with hyperkalemia past E. coli UTIs. Today he reports generalized weakness since 4 days, chills since 3 days, non-specific abdominal pain and constipation since 2 days with loose stools today.  Reports +Covid-19 sick contacts at home. Denies cough, difficulty breathing, chest pain, palpitations. Directed to [facility] ED r/o COVID. Seen by ID -BCx & urine Cx sent, COVID 19 PCR positive- started on Plaquenil, cefepime and flagyl. Renal function improved with volume resuscitation, home BP medications currently on hold. This a 65 y/o male PMH ACC/AHA stage D HF due to NICM HM2 LVAD, TV annuloplasty ring 9/12/17 as destination therapy due to severe peripheral artery disease with significant stenosis, SIADH, Depression, CKD-3, with hyperkalemia past E. coli UTIs. Today he reports generalized weakness since 4 days, chills since 3 days, non-specific abdominal pain and constipation since 2 days with loose stools today.  Reports +Covid-19 sick contacts at home. Denies cough, difficulty breathing, chest pain, palpitations. Directed to [facility] ED r/o COVID. Seen by ID -BCx & urine Cx sent, COVID 19 PCR positive- started on Plaquenil, cefepime and flagyl. Renal function improved with volume resuscitation, home BP medications currently on hold.   4/2: VSS, Per ID d/c cefepime and flagyl today. Continue Plaquenil taper. INR 4.27 holding Coumadin tonight, Of note overnight patient had two febrile episodes, tylenol was given. MAPs went down to 30, IV fluid was given and MAPs improved.

## 2020-04-02 NOTE — PROGRESS NOTE ADULT - ATTENDING COMMENTS
Patient seen and examined with Dr. Hernandez    I agree with his interval history exam and plans as noted above    LVAD admitted with COVID-19 pneumonia    Supportive care  supplemental oxygen as needed  continue Yevgeniyl    Morris Prater MD  [phone]  After 5pm/weekends [phone]

## 2020-04-02 NOTE — PROGRESS NOTE ADULT - PROBLEM SELECTOR PLAN 1
COVID Isolation/ Droplet precautions   ID following  Continue Plaquenil taper  Per ID d/c cefepime flagyl today COVID Isolation/ Droplet precautions  ID following  Continue Plaquenil taper  Per ID d/c cefepime and flagyl today

## 2020-04-03 LAB
ALBUMIN SERPL ELPH-MCNC: 3.9 G/DL — SIGNIFICANT CHANGE UP (ref 3.3–5)
ALP SERPL-CCNC: 60 U/L — SIGNIFICANT CHANGE UP (ref 40–120)
ALT FLD-CCNC: 12 U/L — SIGNIFICANT CHANGE UP (ref 10–45)
ANION GAP SERPL CALC-SCNC: 14 MMOL/L — SIGNIFICANT CHANGE UP (ref 5–17)
AST SERPL-CCNC: 33 U/L — SIGNIFICANT CHANGE UP (ref 10–40)
BILIRUB DIRECT SERPL-MCNC: <0.1 MG/DL — SIGNIFICANT CHANGE UP (ref 0–0.2)
BILIRUB INDIRECT FLD-MCNC: >0.2 MG/DL — SIGNIFICANT CHANGE UP (ref 0.2–1)
BILIRUB SERPL-MCNC: 0.3 MG/DL — SIGNIFICANT CHANGE UP (ref 0.2–1.2)
BUN SERPL-MCNC: 28 MG/DL — HIGH (ref 7–23)
CALCIUM SERPL-MCNC: 8.9 MG/DL — SIGNIFICANT CHANGE UP (ref 8.4–10.5)
CHLORIDE SERPL-SCNC: 97 MMOL/L — SIGNIFICANT CHANGE UP (ref 96–108)
CO2 SERPL-SCNC: 21 MMOL/L — LOW (ref 22–31)
CREAT SERPL-MCNC: 1.19 MG/DL — SIGNIFICANT CHANGE UP (ref 0.5–1.3)
CRP SERPL-MCNC: 10.16 MG/DL — HIGH (ref 0–0.4)
ERYTHROCYTE [SEDIMENTATION RATE] IN BLOOD: 115 MM/HR — HIGH (ref 0–20)
FERRITIN SERPL-MCNC: 322 NG/ML — SIGNIFICANT CHANGE UP (ref 30–400)
G6PD RBC-CCNC: 14.7 U/G HGB — SIGNIFICANT CHANGE UP (ref 7–20.5)
GLUCOSE SERPL-MCNC: 82 MG/DL — SIGNIFICANT CHANGE UP (ref 70–99)
HCT VFR BLD CALC: 40.7 % — SIGNIFICANT CHANGE UP (ref 39–50)
HGB BLD-MCNC: 12.7 G/DL — LOW (ref 13–17)
INR BLD: 2.08 RATIO — HIGH (ref 0.88–1.16)
LDH SERPL L TO P-CCNC: 357 U/L — HIGH (ref 50–242)
MAGNESIUM SERPL-MCNC: 2.5 MG/DL — SIGNIFICANT CHANGE UP (ref 1.6–2.6)
MCHC RBC-ENTMCNC: 28.5 PG — SIGNIFICANT CHANGE UP (ref 27–34)
MCHC RBC-ENTMCNC: 31.2 GM/DL — LOW (ref 32–36)
MCV RBC AUTO: 91.3 FL — SIGNIFICANT CHANGE UP (ref 80–100)
NRBC # BLD: 0 /100 WBCS — SIGNIFICANT CHANGE UP (ref 0–0)
PLATELET # BLD AUTO: 92 K/UL — LOW (ref 150–400)
POTASSIUM SERPL-MCNC: 5 MMOL/L — SIGNIFICANT CHANGE UP (ref 3.5–5.3)
POTASSIUM SERPL-SCNC: 5 MMOL/L — SIGNIFICANT CHANGE UP (ref 3.5–5.3)
PROCALCITONIN SERPL-MCNC: 0.11 NG/ML — HIGH (ref 0.02–0.1)
PROT SERPL-MCNC: 8 G/DL — SIGNIFICANT CHANGE UP (ref 6–8.3)
PROTHROM AB SERPL-ACNC: 24.1 SEC — HIGH (ref 10–13.1)
RBC # BLD: 4.46 M/UL — SIGNIFICANT CHANGE UP (ref 4.2–5.8)
RBC # FLD: 13.8 % — SIGNIFICANT CHANGE UP (ref 10.3–14.5)
SODIUM SERPL-SCNC: 132 MMOL/L — LOW (ref 135–145)
WBC # BLD: 3.03 K/UL — LOW (ref 3.8–10.5)
WBC # FLD AUTO: 3.03 K/UL — LOW (ref 3.8–10.5)

## 2020-04-03 PROCEDURE — 99232 SBSQ HOSP IP/OBS MODERATE 35: CPT

## 2020-04-03 PROCEDURE — 99233 SBSQ HOSP IP/OBS HIGH 50: CPT | Mod: 25

## 2020-04-03 PROCEDURE — 99232 SBSQ HOSP IP/OBS MODERATE 35: CPT | Mod: GC

## 2020-04-03 PROCEDURE — 93750 INTERROGATION VAD IN PERSON: CPT

## 2020-04-03 RX ORDER — WARFARIN SODIUM 2.5 MG/1
2 TABLET ORAL ONCE
Refills: 0 | Status: COMPLETED | OUTPATIENT
Start: 2020-04-03 | End: 2020-04-03

## 2020-04-03 RX ORDER — SODIUM CHLORIDE 9 MG/ML
500 INJECTION INTRAMUSCULAR; INTRAVENOUS; SUBCUTANEOUS ONCE
Refills: 0 | Status: COMPLETED | OUTPATIENT
Start: 2020-04-03 | End: 2020-04-03

## 2020-04-03 RX ADMIN — Medication 200 MILLIGRAM(S): at 10:01

## 2020-04-03 RX ADMIN — Medication 650 MILLIGRAM(S): at 18:13

## 2020-04-03 RX ADMIN — Medication 650 MILLIGRAM(S): at 01:58

## 2020-04-03 RX ADMIN — Medication 81 MILLIGRAM(S): at 10:01

## 2020-04-03 RX ADMIN — Medication 650 MILLIGRAM(S): at 06:39

## 2020-04-03 RX ADMIN — FAMOTIDINE 20 MILLIGRAM(S): 10 INJECTION INTRAVENOUS at 10:02

## 2020-04-03 RX ADMIN — SODIUM CHLORIDE 3 MILLILITER(S): 9 INJECTION INTRAMUSCULAR; INTRAVENOUS; SUBCUTANEOUS at 10:01

## 2020-04-03 RX ADMIN — MIRTAZAPINE 7.5 MILLIGRAM(S): 45 TABLET, ORALLY DISINTEGRATING ORAL at 22:42

## 2020-04-03 RX ADMIN — SODIUM CHLORIDE 3 MILLILITER(S): 9 INJECTION INTRAMUSCULAR; INTRAVENOUS; SUBCUTANEOUS at 22:42

## 2020-04-03 RX ADMIN — Medication 650 MILLIGRAM(S): at 11:30

## 2020-04-03 RX ADMIN — SODIUM CHLORIDE 3 MILLILITER(S): 9 INJECTION INTRAMUSCULAR; INTRAVENOUS; SUBCUTANEOUS at 05:55

## 2020-04-03 RX ADMIN — SODIUM CHLORIDE 500 MILLILITER(S): 9 INJECTION INTRAMUSCULAR; INTRAVENOUS; SUBCUTANEOUS at 10:02

## 2020-04-03 RX ADMIN — Medication 200 MILLIGRAM(S): at 22:42

## 2020-04-03 RX ADMIN — CHLORHEXIDINE GLUCONATE 1 APPLICATION(S): 213 SOLUTION TOPICAL at 10:01

## 2020-04-03 RX ADMIN — WARFARIN SODIUM 2 MILLIGRAM(S): 2.5 TABLET ORAL at 22:42

## 2020-04-03 NOTE — PROGRESS NOTE ADULT - ASSESSMENT
ASSESSMENT:  64/M with PMH CHF s/p LVAD Sept 2017 (on Warfarin), CAD, s/p TVR, SIADH, Depression, CKD-3, past E. coli UTIs  P/w generalized weakness since 4 days; chills since 3 days; non-specific abdominal pain and constipation since 2 days and loose stools today  H/o sick contacts with Covid-19 at home. Imaging concerning for new opacities in RUL, Covid-19 PCR pending.  =========  LVAD with Covid-19 infection  - pt denied cough, upper resp symptoms, fevers. Appears comfortable on RA, able to speak full sentences without dyspnea  - However, he has sick contacts with Covid-19 at home and imaging findings of RUL opacities  - Covid-19 Labs - ; Procalcitonin 0.45 elevated ferritin and CRP    RECOMMENDATIONS:  - continue Hydroxychloroquine  - Continue supplemental O2 and supportive care per primary team  - Continue Contact and Airborne Isolation precautions    ABELARDO Forbes MD  Fellow, Infectious Diseases  Pager: [phone]  After 5pm and on Weekends: Call [phone]

## 2020-04-03 NOTE — PROGRESS NOTE ADULT - PROBLEM SELECTOR PLAN 3
K 5.0 today, s/p Lokelma Insulin, D50 on 4/1  Continue to monitor. resolved  K today 5.0    bmp daily

## 2020-04-03 NOTE — PROGRESS NOTE ADULT - ASSESSMENT
Mr. Quispe is a 64 year old man with ACC/AHA stage D HF due to NICM, s/p HM2 LVAD  in 9/12/17 as destination therapy due to severe peripheral artery disease with significant stenosis. He currently presents with COVID-19 accompanied by abdominal pain, diarrhea and acute renal failure.     He is gradually improving. His acute renal failure has resolved and he is hemodynamically stable. He continues to be intermittently febrile, but he is not hypoxicc. ID following and he has been started on Plaquenil. He is without evidence of LVAD malfunction.

## 2020-04-03 NOTE — PROGRESS NOTE ADULT - ASSESSMENT
This a 65 y/o male PMH ACC/AHA stage D HF due to NICM HM2 LVAD, TV annuloplasty ring 9/12/17 as destination therapy due to severe peripheral artery disease with significant stenosis, SIADH, Depression, CKD-3, with hyperkalemia past E. coli UTIs. Today he reports generalized weakness since 4 days, chills since 3 days, non-specific abdominal pain and constipation since 2 days with loose stools today.  Reports +Covid-19 sick contacts at home. Denies cough, difficulty breathing, chest pain, palpitations. Directed to [facility] ED r/o COVID. Seen by ID -BCx & urine Cx sent, COVID 19 PCR positive- started on Plaquenil, cefepime and flagyl. Renal function improved with volume resuscitation, home BP medications currently on hold.   4/2: VSS, Per ID d/c cefepime and flagyl today. Continue Plaquenil taper. INR 4.27 holding Coumadin tonight, Of note overnight patient had two febrile episodes, tylenol was given. MAPs went down to 30, IV fluid was given and MAPs improved. This a 65 y/o male PMH ACC/AHA stage D HF due to NICM HM2 LVAD, TV annuloplasty ring 9/12/17 as destination therapy due to severe peripheral artery disease with significant stenosis, SIADH, Depression, CKD-3, with hyperkalemia past E. coli UTIs. Today he reports generalized weakness since 4 days, chills since 3 days, non-specific abdominal pain and constipation since 2 days with loose stools today.  Reports +Covid-19 sick contacts at home. Denies cough, difficulty breathing, chest pain, palpitations. Directed to [facility] ED r/o COVID. Seen by ID -BCx & urine Cx sent, COVID 19 PCR positive- started on Plaquenil, cefepime and flagyl. Renal function improved with volume resuscitation, home BP medications currently on hold.   4/2: VSS, Per ID d/c cefepime and flagyl today. Continue Plaquenil taper. INR 4.27 holding Coumadin tonight, Of note overnight patient had two febrile episodes, tylenol was given. MAPs went down to 30, IV fluid was given and MAPs improved.   4/3 temp overnight max 101.5- tylenol prn; wbc 3; CRP 5.6 today; continue to monitor closely on plaquenil;  MAP's improved today 70's- IV fluid 500 cc normal saline as per Dr. Iyer   continue to monitor O2 sats: 98% RA ; anticoagulation with coumadin INR 3.08- coumadin 2 mg this evening

## 2020-04-03 NOTE — PROGRESS NOTE ADULT - SUBJECTIVE AND OBJECTIVE BOX
Subjective: No overnight events. He continues to complain of abdominal pain, but feels better otherwise. No cough.     Medications:  acetaminophen   Tablet .. 650 milliGRAM(s) Oral every 6 hours PRN  aspirin enteric coated 81 milliGRAM(s) Oral daily  chlorhexidine 4% Liquid 1 Application(s) Topical <User Schedule>  famotidine    Tablet 20 milliGRAM(s) Oral daily  hydroxychloroquine   Oral   hydroxychloroquine 200 milliGRAM(s) Oral every 12 hours  mirtazapine 7.5 milliGRAM(s) Oral at bedtime  sodium chloride 0.9% lock flush 3 milliLiter(s) IV Push every 8 hours  warfarin 2 milliGRAM(s) Oral once      Physical Exam:  Vital Signs Last 24 Hrs  T(C): 37.8 (03 Apr 2020 14:04), Max: 38.7 (02 Apr 2020 18:06)  T(F): 100 (03 Apr 2020 14:04), Max: 101.6 (02 Apr 2020 18:06)  HR: 101 (03 Apr 2020 14:04) (101 - 111)  BP(mean): 88 (03 Apr 2020 14:04) (70 - 88)  RR: 18 (03 Apr 2020 14:04) (18 - 20)  SpO2: 98% (03 Apr 2020 14:04) (98% - 100%)    I&O's Summary    02 Apr 2020 07:01  -  03 Apr 2020 07:00  --------------------------------------------------------  IN: 200 mL / OUT: 1185 mL / NET: -985 mL    03 Apr 2020 07:01  -  03 Apr 2020 17:34  --------------------------------------------------------  IN: 500 mL / OUT: 300 mL / NET: 200 mL    General: No distress. Comfortable.  HEENT: EOM intact.  Neck: Neck supple. JVP not elevated. No masses  Chest: Clear to auscultation bilaterally  CV: Typical LVAD sounds. No distal pulses  Abdomen: Soft, non-distended, but tender to palpation throughout.   Driveline exit site: Clean, dry, and intact  Skin: No rashes or skin breakdown  Neurology: Alert and oriented times three. Sensation intact  Psych: Affect normal    LVAD Interrogation: Heart Mate II  RPMs: 8800  Power: 5.1  Flow: 4.9  PI: 6  Event: Frequent PI events.   No programming changes were made    Labs:                        12.7   3.03  )-----------( 92       ( 03 Apr 2020 10:44 )             40.7     04-03    132<L>  |  97  |  28<H>  ----------------------------<  82  5.0   |  21<L>  |  1.19    Ca    8.9      03 Apr 2020 10:44  Phos  3.2     04-02  Mg     2.5     04-03    TPro  8.0  /  Alb  3.9  /  TBili  0.3  /  DBili  <0.1  /  AST  33  /  ALT  12  /  AlkPhos  60  04-03    PT/INR - ( 03 Apr 2020 13:48 )   PT: 24.1 sec;   INR: 2.08 ratio     PTT - ( 02 Apr 2020 11:09 )  PTT:48.5 sec    Lactate Dehydrogenase, Serum: 357 U/L (04-03 @ 10:44)  Lactate Dehydrogenase, Serum: 319 U/L (04-02 @ 11:09)  Lactate Dehydrogenase, Serum: 320 U/L (04-02 @ 06:23)  Lactate Dehydrogenase, Serum: 328 U/L (04-01 @ 10:26)  Lactate Dehydrogenase, Serum: 337 U/L (03-31 @ 20:00)

## 2020-04-03 NOTE — PROGRESS NOTE ADULT - PROBLEM SELECTOR PLAN 1
COVID Isolation/ Droplet precautions  ID following  Continue Plaquenil taper  Per ID d/c cefepime and flagyl today COVID Isolation/ Droplet precautions  ID following  Continue Plaquenil taper  monitor crp/ esr and procalcitonin levels

## 2020-04-03 NOTE — PROGRESS NOTE ADULT - PROBLEM SELECTOR PLAN 2
Heart Failure following  Continue asa 81 daily  BP medications on hold - will resume if MAPs improved  Holding Coumadin today for elevated INR of 4.27. Will resume Coumadin when feasible.  Continue to monitor PT/INR daily  Elevated LDH, 320 today. Continue monitor daily LDH levels  Continue daily labs CBC/BMP/Hepatic function/Mg Heart Failure following  Continue asa 81 daily  BP medications on hold - will resume if MAPs improved  anticoagulation with coumadin INR 3.0 - coumadin 2 mg this evening   Continue to monitor PT/INR daily  Elevated LDH, 357  today. Continue monitor daily LDH levels  Continue daily labs CBC/BMP/Hepatic function/Mg

## 2020-04-03 NOTE — PROGRESS NOTE ADULT - SUBJECTIVE AND OBJECTIVE BOX
Follow Up: LVAD with Covid-19 infection    Interval History/ROS:Patient is a 64y old  Male who presents with a chief complaint of Nausea/ (03 Apr 2020 14:59)  - multiple fevers reported overnight  - patient assessed at bedside. Reported LLQ abdominal pain    Allergies    No Known Allergies    Intolerances        ANTIMICROBIALS:  hydroxychloroquine    hydroxychloroquine 200 every 12 hours      OTHER MEDS:  acetaminophen   Tablet .. 650 milliGRAM(s) Oral every 6 hours PRN  aspirin enteric coated 81 milliGRAM(s) Oral daily  chlorhexidine 4% Liquid 1 Application(s) Topical <User Schedule>  famotidine    Tablet 20 milliGRAM(s) Oral daily  mirtazapine 7.5 milliGRAM(s) Oral at bedtime  sodium chloride 0.9% lock flush 3 milliLiter(s) IV Push every 8 hours  warfarin 2 milliGRAM(s) Oral once      Vital Signs Last 24 Hrs  T(C): 37.8 (03 Apr 2020 14:04), Max: 38.7 (02 Apr 2020 18:06)  T(F): 100 (03 Apr 2020 14:04), Max: 101.6 (02 Apr 2020 18:06)  HR: 101 (03 Apr 2020 14:04) (101 - 111)  BP: --  BP(mean): 88 (03 Apr 2020 14:04) (70 - 88)  RR: 18 (03 Apr 2020 14:04) (18 - 20)  SpO2: 98% (03 Apr 2020 14:04) (98% - 100%)    EXAM:  Constitutional: Not in acute distress  Eyes: No icterus. Pupils b/l reactive to light.  Oral cavity: Clear, no lesions  Neck: No neck vein distension noted  RS: Chest clear to auscultation bilaterally. No wheeze/rhonchi/crepitations.  CVS: S1, S2 heard. Regular rate and rhythm. No murmurs/rubs/gallops.  Abdomen: Soft. No guarding/rigidity/tenderness.  : No acute abnormalities  Extremities: Warm. No pedal edema  Skin: No lesions noted  Vascular: No evidence of phlebitis  Neuro: Alert, oriented to time/place/person                        12.7   3.03  )-----------( 92       ( 03 Apr 2020 10:44 )             40.7       04-03    132<L>  |  97  |  28<H>  ----------------------------<  82  5.0   |  21<L>  |  1.19    Ca    8.9      03 Apr 2020 10:44  Phos  3.2     04-02  Mg     2.5     04-03    TPro  8.0  /  Alb  3.9  /  TBili  0.3  /  DBili  <0.1  /  AST  33  /  ALT  12  /  AlkPhos  60  04-03          MICROBIOLOGY:Culture Results:   No growth (04-01 @ 00:56)  Culture Results:   No growth to date. (03-31 @ 21:23)  Culture Results:   No growth to date. (03-31 @ 21:23) Follow Up: LVAD with Covid-19 infection    Interval History/ROS:Patient is a 64y old  Male who presents with a chief complaint of Nausea/ (03 Apr 2020 14:59)  - multiple fevers reported overnight  - patient assessed at bedside. Reported LLQ abdominal pain    Allergies    No Known Allergies    Intolerances        ANTIMICROBIALS:  hydroxychloroquine    hydroxychloroquine 200 every 12 hours      OTHER MEDS:  acetaminophen   Tablet .. 650 milliGRAM(s) Oral every 6 hours PRN  aspirin enteric coated 81 milliGRAM(s) Oral daily  chlorhexidine 4% Liquid 1 Application(s) Topical <User Schedule>  famotidine    Tablet 20 milliGRAM(s) Oral daily  mirtazapine 7.5 milliGRAM(s) Oral at bedtime  sodium chloride 0.9% lock flush 3 milliLiter(s) IV Push every 8 hours  warfarin 2 milliGRAM(s) Oral once      Vital Signs Last 24 Hrs  T(C): 37.8 (03 Apr 2020 14:04), Max: 38.7 (02 Apr 2020 18:06)  T(F): 100 (03 Apr 2020 14:04), Max: 101.6 (02 Apr 2020 18:06)  HR: 101 (03 Apr 2020 14:04) (101 - 111)  BP: --  BP(mean): 88 (03 Apr 2020 14:04) (70 - 88)  RR: 18 (03 Apr 2020 14:04) (18 - 20)  SpO2: 98% (03 Apr 2020 14:04) (98% - 100%)    EXAM:  Constitutional: Not in acute distress  Eyes: No icterus. Pupils b/l reactive to light.  Oral cavity: Clear, no lesions  Neck: No neck vein distension noted  RS: Chest clear to auscultation bilaterally. No wheeze/rhonchi/crepitations.  CVS: Mechanical heart sounds heard. No murmurs/rubs/gallops.  Abdomen: Soft. No guarding/rigidity/tenderness.  : No acute abnormalities  Extremities: Warm. No pedal edema  Skin: No lesions noted  Vascular: No evidence of phlebitis  Neuro: Alert, oriented to time/place/person                        12.7   3.03  )-----------( 92       ( 03 Apr 2020 10:44 )             40.7       04-03    132<L>  |  97  |  28<H>  ----------------------------<  82  5.0   |  21<L>  |  1.19    Ca    8.9      03 Apr 2020 10:44  Phos  3.2     04-02  Mg     2.5     04-03    TPro  8.0  /  Alb  3.9  /  TBili  0.3  /  DBili  <0.1  /  AST  33  /  ALT  12  /  AlkPhos  60  04-03          MICROBIOLOGY:Culture Results:   No growth (04-01 @ 00:56)  Culture Results:   No growth to date. (03-31 @ 21:23)  Culture Results:   No growth to date. (03-31 @ 21:23)

## 2020-04-03 NOTE — PROGRESS NOTE ADULT - SUBJECTIVE AND OBJECTIVE BOX
VITAL SIGNS    Telemetry:    Vital Signs Last 24 Hrs  T(C): 37.8 (04-03-20 @ 14:04), Max: 38.7 (04-02-20 @ 18:06)  T(F): 100 (04-03-20 @ 14:04), Max: 101.6 (04-02-20 @ 18:06)  HR: 101 (04-03-20 @ 14:04) (101 - 111)  BP: --  RR: 18 (04-03-20 @ 14:04) (18 - 20)  SpO2: 98% (04-03-20 @ 14:04) (98% - 100%)            04-02 @ 07:01  -  04-03 @ 07:00  --------------------------------------------------------  IN: 200 mL / OUT: 1185 mL / NET: -985 mL    04-03 @ 07:01  -  04-03 @ 14:59  --------------------------------------------------------  IN: 500 mL / OUT: 300 mL / NET: 200 mL       Daily     Daily   Admit Wt: Drug Dosing Weight  Height (cm): 177.8 (31 Mar 2020 13:04)  Weight (kg): 52.2 (31 Mar 2020 13:04)  BMI (kg/m2): 16.5 (31 Mar 2020 13:04)  BSA (m2): 1.65 (31 Mar 2020 13:04)    Bilirubin Direct, Serum: <0.1 mg/dL (04-03 @ 10:44)  Bilirubin Total, Serum: 0.3 mg/dL (04-03 @ 10:44)    CAPILLARY BLOOD GLUCOSE              acetaminophen   Tablet .. 650 milliGRAM(s) Oral every 6 hours PRN  aspirin enteric coated 81 milliGRAM(s) Oral daily  chlorhexidine 4% Liquid 1 Application(s) Topical <User Schedule>  famotidine    Tablet 20 milliGRAM(s) Oral daily  hydroxychloroquine   Oral   hydroxychloroquine 200 milliGRAM(s) Oral every 12 hours  mirtazapine 7.5 milliGRAM(s) Oral at bedtime  sodium chloride 0.9% lock flush 3 milliLiter(s) IV Push every 8 hours      PHYSICAL EXAM    Subjective: "Hi.   Neurology: alert and oriented x 3, nonfocal, no gross deficits  CV : tele:  RSR  Sternal Wound :  CDI with dressing , Stable  Lungs: clear. RR easy, unlabored   Abdomen: soft, nontender, nondistended, positive bowel sounds, bowel movement   Neg N/V/D   :  pt voiding without difficulty   Extremities:   BLANDON; edema, neg calf tenderness.   PPP bilaterally      PW:  Chest tubes: VITAL SIGNS    Telemetry:  -120   Vital Signs Last 24 Hrs  T(C): 37.8 (04-03-20 @ 14:04), Max: 38.7 (04-02-20 @ 18:06)  T(F): 100 (04-03-20 @ 14:04), Max: 101.6 (04-02-20 @ 18:06)  HR: 101 (04-03-20 @ 14:04) (101 - 111)  BP: --  RR: 18 (04-03-20 @ 14:04) (18 - 20)  SpO2: 98% (04-03-20 @ 14:04) (98% - 100%)            04-02 @ 07:01  -  04-03 @ 07:00  --------------------------------------------------------  IN: 200 mL / OUT: 1185 mL / NET: -985 mL    04-03 @ 07:01  -  04-03 @ 14:59  --------------------------------------------------------  IN: 500 mL / OUT: 300 mL / NET: 200 mL       Daily     Daily   Admit Wt: Drug Dosing Weight  Height (cm): 177.8 (31 Mar 2020 13:04)  Weight (kg): 52.2 (31 Mar 2020 13:04)  BMI (kg/m2): 16.5 (31 Mar 2020 13:04)  BSA (m2): 1.65 (31 Mar 2020 13:04)    Bilirubin Direct, Serum: <0.1 mg/dL (04-03 @ 10:44)  Bilirubin Total, Serum: 0.3 mg/dL (04-03 @ 10:44)         acetaminophen   Tablet .. 650 milliGRAM(s) Oral every 6 hours PRN  aspirin enteric coated 81 milliGRAM(s) Oral daily  chlorhexidine 4% Liquid 1 Application(s) Topical <User Schedule>  famotidine    Tablet 20 milliGRAM(s) Oral daily  hydroxychloroquine   Oral   hydroxychloroquine 200 milliGRAM(s) Oral every 12 hours  mirtazapine 7.5 milliGRAM(s) Oral at bedtime  sodium chloride 0.9% lock flush 3 milliLiter(s) IV Push every 8 hours      PHYSICAL EXAM    Subjective: "I don't feel too bad."   Neurology: alert and oriented x 3, nonfocal, no gross deficits  CV : tele:  -120; typical LVAD hum   Sternal Wound :  CDI TAI - well healed   Lungs: clear. RR easy, unlabored   Abdomen: soft, nontender, nondistended, positive bowel sounds, + bowel movement   Neg N/V/D; driveline cdi with dressing   :  + aleman - clear, yellow urine   Extremities:   BLANDON; trace LE edema, neg calf tenderness.   PPP bilaterally

## 2020-04-03 NOTE — PROGRESS NOTE ADULT - ATTENDING COMMENTS
Patient seen and examined with Dr. Hernandez    I agree with his interval history exam and plans as noted above    Patient remains febrile but breathing comfortably on room air  no cough, complains of GI symptoms and has LLQ tenderness on exam    Continue supportive measures for COVID infection  Continue five course of hydroxychloroquine  monitor Q tc interval while on plaquenil    LVAD- no drainage on exit site site dressing  no evidence of infection    Morris Prater MD  [phone]  After 5pm/weekends [phone]

## 2020-04-04 DIAGNOSIS — N40.0 BENIGN PROSTATIC HYPERPLASIA WITHOUT LOWER URINARY TRACT SYMPTOMS: ICD-10-CM

## 2020-04-04 LAB
ALBUMIN SERPL ELPH-MCNC: 3.8 G/DL — SIGNIFICANT CHANGE UP (ref 3.3–5)
ALP SERPL-CCNC: 58 U/L — SIGNIFICANT CHANGE UP (ref 40–120)
ALT FLD-CCNC: 11 U/L — SIGNIFICANT CHANGE UP (ref 10–45)
ANION GAP SERPL CALC-SCNC: 16 MMOL/L — SIGNIFICANT CHANGE UP (ref 5–17)
AST SERPL-CCNC: 33 U/L — SIGNIFICANT CHANGE UP (ref 10–40)
BILIRUB SERPL-MCNC: 0.3 MG/DL — SIGNIFICANT CHANGE UP (ref 0.2–1.2)
BUN SERPL-MCNC: 25 MG/DL — HIGH (ref 7–23)
CALCIUM SERPL-MCNC: 9 MG/DL — SIGNIFICANT CHANGE UP (ref 8.4–10.5)
CHLORIDE SERPL-SCNC: 99 MMOL/L — SIGNIFICANT CHANGE UP (ref 96–108)
CO2 SERPL-SCNC: 17 MMOL/L — LOW (ref 22–31)
CREAT SERPL-MCNC: 0.98 MG/DL — SIGNIFICANT CHANGE UP (ref 0.5–1.3)
CRP SERPL-MCNC: 9.64 MG/DL — HIGH (ref 0–0.4)
CULTURE RESULTS: SIGNIFICANT CHANGE UP
CULTURE RESULTS: SIGNIFICANT CHANGE UP
ERYTHROCYTE [SEDIMENTATION RATE] IN BLOOD: 119 MM/HR — HIGH (ref 0–20)
FERRITIN SERPL-MCNC: 391 NG/ML — SIGNIFICANT CHANGE UP (ref 30–400)
GLUCOSE SERPL-MCNC: 71 MG/DL — SIGNIFICANT CHANGE UP (ref 70–99)
HCT VFR BLD CALC: 37.1 % — LOW (ref 39–50)
HGB BLD-MCNC: 12.9 G/DL — LOW (ref 13–17)
INR BLD: 2.07 RATIO — HIGH (ref 0.88–1.16)
LDH SERPL L TO P-CCNC: 391 U/L — HIGH (ref 50–242)
MCHC RBC-ENTMCNC: 31.2 PG — SIGNIFICANT CHANGE UP (ref 27–34)
MCHC RBC-ENTMCNC: 34.8 GM/DL — SIGNIFICANT CHANGE UP (ref 32–36)
MCV RBC AUTO: 89.8 FL — SIGNIFICANT CHANGE UP (ref 80–100)
NRBC # BLD: 0 /100 WBCS — SIGNIFICANT CHANGE UP (ref 0–0)
NT-PROBNP SERPL-SCNC: 397 PG/ML — HIGH (ref 0–300)
PLATELET # BLD AUTO: 96 K/UL — LOW (ref 150–400)
POTASSIUM SERPL-MCNC: 5.2 MMOL/L — SIGNIFICANT CHANGE UP (ref 3.5–5.3)
POTASSIUM SERPL-SCNC: 5.2 MMOL/L — SIGNIFICANT CHANGE UP (ref 3.5–5.3)
PROCALCITONIN SERPL-MCNC: 0.11 NG/ML — HIGH (ref 0.02–0.1)
PROT SERPL-MCNC: 7.8 G/DL — SIGNIFICANT CHANGE UP (ref 6–8.3)
PROTHROM AB SERPL-ACNC: 24.1 SEC — HIGH (ref 10–12.9)
RBC # BLD: 4.13 M/UL — LOW (ref 4.2–5.8)
RBC # FLD: 14 % — SIGNIFICANT CHANGE UP (ref 10.3–14.5)
SODIUM SERPL-SCNC: 132 MMOL/L — LOW (ref 135–145)
SPECIMEN SOURCE: SIGNIFICANT CHANGE UP
SPECIMEN SOURCE: SIGNIFICANT CHANGE UP
WBC # BLD: 2.95 K/UL — LOW (ref 3.8–10.5)
WBC # FLD AUTO: 2.95 K/UL — LOW (ref 3.8–10.5)

## 2020-04-04 PROCEDURE — 99232 SBSQ HOSP IP/OBS MODERATE 35: CPT

## 2020-04-04 PROCEDURE — 99233 SBSQ HOSP IP/OBS HIGH 50: CPT

## 2020-04-04 RX ORDER — WARFARIN SODIUM 2.5 MG/1
2.5 TABLET ORAL ONCE
Refills: 0 | Status: COMPLETED | OUTPATIENT
Start: 2020-04-04 | End: 2020-04-04

## 2020-04-04 RX ORDER — FINASTERIDE 5 MG/1
5 TABLET, FILM COATED ORAL DAILY
Refills: 0 | Status: DISCONTINUED | OUTPATIENT
Start: 2020-04-04 | End: 2020-04-20

## 2020-04-04 RX ORDER — TAMSULOSIN HYDROCHLORIDE 0.4 MG/1
0.4 CAPSULE ORAL AT BEDTIME
Refills: 0 | Status: DISCONTINUED | OUTPATIENT
Start: 2020-04-04 | End: 2020-04-20

## 2020-04-04 RX ADMIN — SODIUM CHLORIDE 3 MILLILITER(S): 9 INJECTION INTRAMUSCULAR; INTRAVENOUS; SUBCUTANEOUS at 13:15

## 2020-04-04 RX ADMIN — FAMOTIDINE 20 MILLIGRAM(S): 10 INJECTION INTRAVENOUS at 11:13

## 2020-04-04 RX ADMIN — CHLORHEXIDINE GLUCONATE 1 APPLICATION(S): 213 SOLUTION TOPICAL at 09:15

## 2020-04-04 RX ADMIN — Medication 81 MILLIGRAM(S): at 11:12

## 2020-04-04 RX ADMIN — Medication 650 MILLIGRAM(S): at 00:36

## 2020-04-04 RX ADMIN — SODIUM CHLORIDE 3 MILLILITER(S): 9 INJECTION INTRAMUSCULAR; INTRAVENOUS; SUBCUTANEOUS at 20:19

## 2020-04-04 RX ADMIN — MIRTAZAPINE 7.5 MILLIGRAM(S): 45 TABLET, ORALLY DISINTEGRATING ORAL at 20:19

## 2020-04-04 RX ADMIN — Medication 650 MILLIGRAM(S): at 06:31

## 2020-04-04 RX ADMIN — TAMSULOSIN HYDROCHLORIDE 0.4 MILLIGRAM(S): 0.4 CAPSULE ORAL at 20:19

## 2020-04-04 RX ADMIN — Medication 650 MILLIGRAM(S): at 16:49

## 2020-04-04 RX ADMIN — Medication 200 MILLIGRAM(S): at 11:13

## 2020-04-04 RX ADMIN — FINASTERIDE 5 MILLIGRAM(S): 5 TABLET, FILM COATED ORAL at 16:50

## 2020-04-04 RX ADMIN — SODIUM CHLORIDE 3 MILLILITER(S): 9 INJECTION INTRAMUSCULAR; INTRAVENOUS; SUBCUTANEOUS at 06:31

## 2020-04-04 RX ADMIN — Medication 200 MILLIGRAM(S): at 20:19

## 2020-04-04 RX ADMIN — WARFARIN SODIUM 2.5 MILLIGRAM(S): 2.5 TABLET ORAL at 20:19

## 2020-04-04 NOTE — PROGRESS NOTE ADULT - SUBJECTIVE AND OBJECTIVE BOX
VITAL SIGNS    Telemetry:    Vital Signs Last 24 Hrs  T(C): 38.3 (04-04-20 @ 06:15), Max: 38.3 (04-03-20 @ 19:34)  T(F): 100.9 (04-04-20 @ 06:15), Max: 100.9 (04-03-20 @ 19:34)  HR: 115 (04-04-20 @ 06:15) (100 - 115)  BP: --  RR: 18 (04-04-20 @ 06:15) (17 - 18)  SpO2: 98% (04-04-20 @ 06:15) (97% - 100%)            04-03 @ 07:01  -  04-04 @ 07:00  --------------------------------------------------------  IN: 700 mL / OUT: 1025 mL / NET: -325 mL       Daily     Daily   Admit Wt: Drug Dosing Weight  Height (cm): 177.8 (31 Mar 2020 13:04)  Weight (kg): 52.2 (31 Mar 2020 13:04)  BMI (kg/m2): 16.5 (31 Mar 2020 13:04)  BSA (m2): 1.65 (31 Mar 2020 13:04)    Bilirubin Total, Serum: 0.3 mg/dL (04-04 @ 05:53)    CAPILLARY BLOOD GLUCOSE              acetaminophen   Tablet .. 650 milliGRAM(s) Oral every 6 hours PRN  aspirin enteric coated 81 milliGRAM(s) Oral daily  chlorhexidine 4% Liquid 1 Application(s) Topical <User Schedule>  famotidine    Tablet 20 milliGRAM(s) Oral daily  hydroxychloroquine   Oral   hydroxychloroquine 200 milliGRAM(s) Oral every 12 hours  mirtazapine 7.5 milliGRAM(s) Oral at bedtime  sodium chloride 0.9% lock flush 3 milliLiter(s) IV Push every 8 hours      PHYSICAL EXAM    Subjective: "Hi.   Neurology: alert and oriented x 3, nonfocal, no gross deficits  CV : tele:  RSR  Sternal Wound :  CDI with dressing , Stable  Lungs: clear. RR easy, unlabored   Abdomen: soft, nontender, nondistended, positive bowel sounds, bowel movement   Neg N/V/D   :  pt voiding without difficulty   Extremities:   BLANDON; edema, neg calf tenderness.   PPP bilaterally      PW:  Chest tubes: VITAL SIGNS    Telemetry:  rsr/st 100-110   Vital Signs Last 24 Hrs  T(C): 38.3 (04-04-20 @ 06:15), Max: 38.3 (04-03-20 @ 19:34)  T(F): 100.9 (04-04-20 @ 06:15), Max: 100.9 (04-03-20 @ 19:34)  HR: 115 (04-04-20 @ 06:15) (100 - 115)  BP: --  RR: 18 (04-04-20 @ 06:15) (17 - 18)  SpO2: 98% (04-04-20 @ 06:15) (97% - 100%)            04-03 @ 07:01  -  04-04 @ 07:00  --------------------------------------------------------  IN: 700 mL / OUT: 1025 mL / NET: -325 mL       Daily     Daily   Admit Wt: Drug Dosing Weight  Height (cm): 177.8 (31 Mar 2020 13:04)  Weight (kg): 52.2 (31 Mar 2020 13:04)  BMI (kg/m2): 16.5 (31 Mar 2020 13:04)  BSA (m2): 1.65 (31 Mar 2020 13:04)    Bilirubin Total, Serum: 0.3 mg/dL (04-04 @ 05:53)      acetaminophen   Tablet .. 650 milliGRAM(s) Oral every 6 hours PRN  aspirin enteric coated 81 milliGRAM(s) Oral daily  chlorhexidine 4% Liquid 1 Application(s) Topical <User Schedule>  famotidine    Tablet 20 milliGRAM(s) Oral daily  hydroxychloroquine   Oral   hydroxychloroquine 200 milliGRAM(s) Oral every 12 hours  mirtazapine 7.5 milliGRAM(s) Oral at bedtime  sodium chloride 0.9% lock flush 3 milliLiter(s) IV Push every 8 hours      PHYSICAL EXAM    Subjective: "Hi.   Neurology: alert and oriented x 3, nonfocal, no gross deficits  CV : tele:  RSR  Sternal Wound :  CDI with dressing , Stable  Lungs: clear. RR easy, unlabored   Abdomen: soft, nontender, nondistended, positive bowel sounds, bowel movement   Neg N/V/D   :  pt voiding without difficulty   Extremities:   BLANDON; edema, neg calf tenderness.   PPP bilaterally      PW:  Chest tubes: VITAL SIGNS    Telemetry:  rsr/st 100-110   Vital Signs Last 24 Hrs  T(C): 38.3 (04-04-20 @ 06:15), Max: 38.3 (04-03-20 @ 19:34)  T(F): 100.9 (04-04-20 @ 06:15), Max: 100.9 (04-03-20 @ 19:34)  HR: 115 (04-04-20 @ 06:15) (100 - 115)  BP: --  RR: 18 (04-04-20 @ 06:15) (17 - 18)  SpO2: 98% (04-04-20 @ 06:15) (97% - 100%)            04-03 @ 07:01  -  04-04 @ 07:00  --------------------------------------------------------  IN: 700 mL / OUT: 1025 mL / NET: -325 mL       Daily     Daily   Admit Wt: Drug Dosing Weight  Height (cm): 177.8 (31 Mar 2020 13:04)  Weight (kg): 52.2 (31 Mar 2020 13:04)  BMI (kg/m2): 16.5 (31 Mar 2020 13:04)  BSA (m2): 1.65 (31 Mar 2020 13:04)    Bilirubin Total, Serum: 0.3 mg/dL (04-04 @ 05:53)      acetaminophen   Tablet .. 650 milliGRAM(s) Oral every 6 hours PRN  aspirin enteric coated 81 milliGRAM(s) Oral daily  chlorhexidine 4% Liquid 1 Application(s) Topical <User Schedule>  famotidine    Tablet 20 milliGRAM(s) Oral daily  hydroxychloroquine   Oral   hydroxychloroquine 200 milliGRAM(s) Oral every 12 hours  mirtazapine 7.5 milliGRAM(s) Oral at bedtime  sodium chloride 0.9% lock flush 3 milliLiter(s) IV Push every 8 hours      PHYSICAL EXAM    Subjective: "I feel ok."   Neurology: alert and oriented x 3, nonfocal, no gross deficits  CV : tele:  RSR/ -110 / typical VAD hum   Sternal Wound :  CDI TAI - well healed   Lungs: clear. RR easy, unlabored   Abdomen: soft, nontender, nondistended, positive bowel sounds, + bowel movement   Neg N/V/D   :  +Landrum - clear, yellow urine   Extremities:   BLANDON; neg LE edema, neg calf tenderness.   PPP bilaterally

## 2020-04-04 NOTE — PROGRESS NOTE ADULT - PROBLEM SELECTOR PLAN 1
COVID Isolation/ Droplet precautions  ID following  Continue Plaquenil taper  monitor crp/ esr and procalcitonin levels

## 2020-04-04 NOTE — PROGRESS NOTE ADULT - SUBJECTIVE AND OBJECTIVE BOX
INFECTIOUS DISEASES FOLLOW UP-- Anitra Prater  [phone]    This is a follow up note for this  64yMale with  Shortness of breath, abdominal pain  COVID+      ROS:  CONSTITUTIONAL: febrile tired    Allergies    No Known Allergies    Intolerances        ANTIBIOTICS/RELEVANT:  antimicrobials  hydroxychloroquine   Oral   hydroxychloroquine 200 milliGRAM(s) Oral every 12 hours    immunologic:    OTHER:  acetaminophen   Tablet .. 650 milliGRAM(s) Oral every 6 hours PRN  aspirin enteric coated 81 milliGRAM(s) Oral daily  chlorhexidine 4% Liquid 1 Application(s) Topical <User Schedule>  famotidine    Tablet 20 milliGRAM(s) Oral daily  finasteride 5 milliGRAM(s) Oral daily  mirtazapine 7.5 milliGRAM(s) Oral at bedtime  sodium chloride 0.9% lock flush 3 milliLiter(s) IV Push every 8 hours  tamsulosin 0.4 milliGRAM(s) Oral at bedtime  warfarin 2.5 milliGRAM(s) Oral once      Objective:  Vital Signs Last 24 Hrs  T(C): 38.3 (04 Apr 2020 16:46), Max: 38.3 (03 Apr 2020 19:34)  T(F): 101 (04 Apr 2020 16:46), Max: 101 (04 Apr 2020 16:46)  HR: 111 (04 Apr 2020 16:58) (100 - 115)  BP: --  BP(mean): 78 (04 Apr 2020 16:58) (78 - 88)  RR: 18 (04 Apr 2020 06:15) (17 - 18)  SpO2: 98% (04 Apr 2020 16:58) (97% - 100%)    PHYSICAL EXAM:  Constitutional:no acute distress  Eyes:ALONSO, EOMI  Ear/Nose/Throat: no oral lesions, 	  Respiratory: clear BL  Cardiovascular: LVAD sounds  driveline exit site intact  Gastrointestinal:soft, (+) BS, no tenderness  Extremities:no e/e/c  No Lymphadenopathy  IV sites not inflammed.    LABS:                        12.9   2.95  )-----------( 96       ( 04 Apr 2020 05:53 )             37.1     04-04    132<L>  |  99  |  25<H>  ----------------------------<  71  5.2   |  17<L>  |  0.98    Ca    9.0      04 Apr 2020 05:53  Mg     2.5     04-03    TPro  7.8  /  Alb  3.8  /  TBili  0.3  /  DBili  x   /  AST  33  /  ALT  11  /  AlkPhos  58  04-04    PT/INR - ( 04 Apr 2020 05:53 )   PT: 24.1 sec;   INR: 2.07 ratio               MICROBIOLOGY:    COVID-19 PCR: Detected: This test has been validated by [employer] to be accurate;  though it has not been FDA cleared/approved by the usual pathway.  As with all laboratory tests, results should be correlated with clinical  findings. (03.31.20 @ 23:06)            RECENT CULTURES:  04-01 @ 00:56  .Urine Clean Catch (Midstream)  --  --  --    No growth  --  03-31 @ 21:23  .Blood Blood-Peripheral  --  --  --    No growth to date.  --      RADIOLOGY & ADDITIONAL STUDIES:    < from: Xray Chest 1 View AP/PA (03.31.20 @ 17:27) >  IMPRESSION:   Subtle linear opacities at the right mid lung field correlate to right upper lobe groundglass opacities better characterized on the same day chest CT.    < end of copied text >

## 2020-04-04 NOTE — PROGRESS NOTE ADULT - PROBLEM SELECTOR PLAN 1
- Management per ID, currently on Plaquenil - Management per ID, currently on Plaquenil. No need to follow EKG.

## 2020-04-04 NOTE — PROGRESS NOTE ADULT - ASSESSMENT
This a 63 y/o male PMH ACC/AHA stage D HF due to NICM HM2 LVAD, TV annuloplasty ring 9/12/17 as destination therapy due to severe peripheral artery disease with significant stenosis, SIADH, Depression, CKD-3, with hyperkalemia past E. coli UTIs. Today he reports generalized weakness since 4 days, chills since 3 days, non-specific abdominal pain and constipation since 2 days with loose stools today.  Reports +Covid-19 sick contacts at home. Denies cough, difficulty breathing, chest pain, palpitations. Directed to [facility] ED r/o COVID. Seen by ID -BCx & urine Cx sent, COVID 19 PCR positive- started on Plaquenil, cefepime and flagyl. Renal function improved with volume resuscitation, home BP medications currently on hold.   4/2: VSS, Per ID d/c cefepime and flagyl today. Continue Plaquenil taper. INR 4.27 holding Coumadin tonight, Of note overnight patient had two febrile episodes, tylenol was given. MAPs went down to 30, IV fluid was given and MAPs improved.   4/3 temp overnight max 101.5- tylenol prn; wbc 3; CRP 5.6 today; continue to monitor closely on plaquenil;  MAP's improved today 70's- IV fluid 500 cc normal saline as per Dr. Iyer   continue to monitor O2 sats: 98% RA ; anticoagulation with coumadin INR 3.08- coumadin 2 mg this evening This a 65 y/o male PMH ACC/AHA stage D HF due to NICM HM2 LVAD, TV annuloplasty ring 9/12/17 as destination therapy due to severe peripheral artery disease with significant stenosis, SIADH, Depression, CKD-3, with hyperkalemia past E. coli UTIs. Today he reports generalized weakness since 4 days, chills since 3 days, non-specific abdominal pain and constipation since 2 days with loose stools today.  Reports +Covid-19 sick contacts at home. Denies cough, difficulty breathing, chest pain, palpitations. Directed to [facility] ED r/o COVID. Seen by ID -BCx & urine Cx sent, COVID 19 PCR positive- started on Plaquenil, cefepime and flagyl. Renal function improved with volume resuscitation, home BP medications currently on hold.   4/2: VSS, Per ID d/c cefepime and flagyl today. Continue Plaquenil taper. INR 4.27 holding Coumadin tonight, Of note overnight patient had two febrile episodes, tylenol was given. MAPs went down to 30, IV fluid was given and MAPs improved.   4/3 temp overnight max 101.5- tylenol prn; wbc 3; CRP 5.6 today; continue to monitor closely on plaquenil;  MAP's improved today 70's- IV fluid 500 cc normal saline as per Dr. Iyer   continue to monitor O2 sats: 98% RA ; anticoagulation with coumadin INR 3.08- coumadin 2 mg this evening  4/4 VSS ; tmax 100.9 overnight;  MAP 80's; resume bph meds today and d/c aleman sun/ mon  anticoagulation with coumadin INR 2.0- coumadin 2.5 mg this evening;  today  CRP 10.1- plaquenil taper as per ID  continue to monitor closely

## 2020-04-04 NOTE — PROGRESS NOTE ADULT - PROBLEM SELECTOR PLAN 2
Heart Failure following  Continue asa 81 daily  BP medications on hold - will resume if MAPs improved  anticoagulation with coumadin INR 3.0 - coumadin 2 mg this evening   Continue to monitor PT/INR daily  Elevated LDH, 357  today. Continue monitor daily LDH levels  Continue daily labs CBC/BMP/Hepatic function/Mg Heart Failure following  Continue asa 81 daily  BP medications on hold - will resume if MAPs improved  anticoagulation with coumadin INR 2.0 - coumadin 2.5 mg this evening   Continue to monitor PT/INR daily  Elevated LDH, 391  today. Continue monitor daily LDH levels  Continue daily labs CBC/BMP/Hepatic function/Mg

## 2020-04-04 NOTE — PROGRESS NOTE ADULT - ASSESSMENT
Mr. Quispe is a 64 year old man with ACC/AHA stage D HF due to NICM, s/p HM2 LVAD  in 9/12/17 as destination therapy due to severe peripheral artery disease with significant stenosis. He currently presents with COVID-19 accompanied by abdominal pain, diarrhea and acute renal failure.     He is gradually improving. His acute renal failure has resolved and he is hemodynamically stable. He continues to be intermittently febrile, but he is not hypoxicc. ID following and he has been started on Plaquenil. He is without evidence of LVAD malfunction.       INCOMPLETE NOTE Mr. Quispe is a 64 year old man with ACC/AHA stage D HF due to NICM, s/p HM2 LVAD  in 9/12/17 as destination therapy due to severe peripheral artery disease with significant stenosis. He currently presents with COVID-19 accompanied by abdominal pain, diarrhea and acute renal failure.     He is gradually improving. His acute renal failure has resolved and he is hemodynamically stable. He continues to be intermittently febrile, but he is not hypoxicc. ID following and he has been started on Plaquenil. He is without evidence of LVAD malfunction.

## 2020-04-04 NOTE — PROGRESS NOTE ADULT - ASSESSMENT
ASSESSMENT:  64/M with PMH CHF s/p LVAD Sept 2017 (on Warfarin), CAD, s/p TVR, SIADH, Depression, CKD-3, past E. coli UTIs  P/w generalized weakness since 4 days; chills since 3 days; non-specific abdominal pain and constipation since 2 days and loose stools today  H/o sick contacts with Covid-19 at home. Imaging concerning for new opacities in RUL, Covid-19 PCR pending.  =========  LVAD with Covid-19 infection  - pt denied cough, upper resp symptoms, fevers. Appears comfortable on RA, able to speak full sentences without dyspnea  - However, he has sick contacts with Covid-19 at home and imaging findings of RUL opacities  - Covid-19 Labs - ; Procalcitonin 0.45 elevated ferritin and CRP    RECOMMENDATIONS:  - continue Hydroxychloroquine  - Continue supplemental O2 and supportive care per primary team  - Continue Contact and Airborne Isolation precautions      Morris Prater MD  [phone]  After 5pm/weekends [phone]

## 2020-04-04 NOTE — PROGRESS NOTE ADULT - SUBJECTIVE AND OBJECTIVE BOX
Subjective: No overnight events. He continues to complain of abdominal pain, but feels better otherwise. No cough.         General: No distress. Comfortable.  HEENT: EOM intact.  Neck: Neck supple. JVP not elevated. No masses  Chest: Clear to auscultation bilaterally  CV: Typical LVAD sounds. No distal pulses  Abdomen: Soft, non-distended, but tender to palpation throughout.   Driveline exit site: Clean, dry, and intact  Skin: No rashes or skin breakdown  Neurology: Alert and oriented times three. Sensation intact  Psych: Affect normal    LVAD Interrogation: Heart Mate II  RPMs: 8800  Power: 5.1  Flow: 4.9  PI: 6  Event: Frequent PI events.   No programming changes were made        Lactate Dehydrogenase, Serum: 357 U/L (04-03 @ 10:44)  Lactate Dehydrogenase, Serum: 319 U/L (04-02 @ 11:09)  Lactate Dehydrogenase, Serum: 320 U/L (04-02 @ 06:23)  Lactate Dehydrogenase, Serum: 328 U/L (04-01 @ 10:26)  Lactate Dehydrogenase, Serum: 337 U/L (03-31 @ 20:00) Subjective: No overnight events.       Current Meds:  acetaminophen   Tablet .. 650 milliGRAM(s) Oral every 6 hours PRN  aspirin enteric coated 81 milliGRAM(s) Oral daily  chlorhexidine 4% Liquid 1 Application(s) Topical <User Schedule>  famotidine    Tablet 20 milliGRAM(s) Oral daily  finasteride 5 milliGRAM(s) Oral daily  hydroxychloroquine   Oral   hydroxychloroquine 200 milliGRAM(s) Oral every 12 hours  mirtazapine 7.5 milliGRAM(s) Oral at bedtime  sodium chloride 0.9% lock flush 3 milliLiter(s) IV Push every 8 hours  tamsulosin 0.4 milliGRAM(s) Oral at bedtime      Vitals:  T(C): 37.1 (04-04-20 @ 20:34), Max: 38.3 (04-04-20 @ 06:15)  T(F): 98.8 (04-04-20 @ 20:34), Max: 101 (04-04-20 @ 16:46)  HR: 113 (04-04-20 @ 20:34) (105 - 115)  MAP 70-80's  RR: 18 (04-04-20 @ 06:15) (17 - 18)  SpO2: 99% (04-04-20 @ 20:34) (97% - 99%)      I&O's Summary    03 Apr 2020 07:01  -  04 Apr 2020 07:00  --------------------------------------------------------  IN: 700 mL / OUT: 1025 mL / NET: -325 mL    04 Apr 2020 07:01  -  04 Apr 2020 21:02  --------------------------------------------------------  IN: 350 mL / OUT: 210 mL / NET: 140 mL      physical exam to be performed by attending only to minimize contact during pandemic:  General: No distress. Comfortable.  HEENT: EOM intact.  Neck: Neck supple. JVP not elevated. No masses  Chest: Clear to auscultation bilaterally  CV: Typical LVAD sounds. No distal pulses  Abdomen: Soft, non-distended, but tender to palpation throughout.   Driveline exit site: Clean, dry, and intact  Skin: No rashes or skin breakdown  Neurology: Alert and oriented times three. Sensation intact  Psych: Affect normal    LVAD Interrogation: Heart Mate II  RPMs: 8800  Power: 5.1  Flow: 4.9  PI: 6  Event: Frequent PI events.   No programming changes were made                            12.9   2.95  )-----------( 96       ( 04 Apr 2020 05:53 )             37.1     04-04    132<L>  |  99  |  25<H>  ----------------------------<  71  5.2   |  17<L>  |  0.98    Ca    9.0      04 Apr 2020 05:53  Mg     2.5     04-03    TPro  7.8  /  Alb  3.8  /  TBili  0.3  /  DBili  x   /  AST  33  /  ALT  11  /  AlkPhos  58  04-04    PT/INR - ( 04 Apr 2020 05:53 )   PT: 24.1 sec;   INR: 2.07 ratio           CARDIAC MARKERS ( 31 Mar 2020 19:57 )  683 ng/L / x     / x     / 336 U/L / x     / 15.7 ng/mL  CARDIAC MARKERS ( 31 Mar 2020 16:49 )  568 ng/L / x     / x     / 318 U/L / x     / 12.2 ng/mL      Serum Pro-Brain Natriuretic Peptide: 397 pg/mL (04-04 @ 05:53)                  Lactate Dehydrogenase, Serum: 391 U/L (04.04.20 @ 05:53)  Lactate Dehydrogenase, Serum: 357 U/L (04-03 @ 10:44)  Lactate Dehydrogenase, Serum: 319 U/L (04-02 @ 11:09)  Lactate Dehydrogenase, Serum: 320 U/L (04-02 @ 06:23)  Lactate Dehydrogenase, Serum: 328 U/L (04-01 @ 10:26)  Lactate Dehydrogenase, Serum: 337 U/L (03-31 @ 20:00)

## 2020-04-04 NOTE — PROGRESS NOTE ADULT - PROBLEM SELECTOR PLAN 4
- Resume coumadin for goal INR of 2-3.   - Continue ASA - Continue to dose coumadin for goal INR of 2-3.   - Continue ASA

## 2020-04-04 NOTE — PROGRESS NOTE ADULT - PROBLEM SELECTOR PLAN 2
- Improving with fluid resuscitation   - Continue to monitor - Improving with fluid resuscitation   - Continue to monitor  -resume home BPH meds and discontinue aleman

## 2020-04-04 NOTE — PROGRESS NOTE ADULT - ATTENDING COMMENTS
64yrs, HMII Sept 2017 for DT (severe PAD).   Chronic abdo pain of unclear etiology.   Admitted 3.31 with abdo pain, chills, diahrrea  found to be COVID pos  RASHAWN: basline Cr .96, admission Cr 4.0.  CT scan: new ground glass opacities RUL. abdo normal.  Required O2 on admission. Admission CRP 5.1  Managed with plaquenil.   Current meds: plaq 200 bid, asa, coumadin fluid bolus X1. remeron. pepcid.   on March 5th.   LVAD flows: 4.9, 8800, PI 6.   100-110, Tmax 100.9, MAPs 80s  I/O: -300.   CRP 9.6, 10.1, 5.6,   Ferritin 391, 228.   INR 2.1, (3.5 on admission)                         12.9   2.95  )-----------( 96       ( 04 Apr 2020 05:53 )             37.1   WBC: 2.95, 3.03. 4.04, 5.12,   04-04    132<L>  |  99  |  25<H>  ----------------------------<  71  5.2   |  17<L>  |  0.98  Ca    9.0      04 Apr 2020 05:53  Mg     2.5     04-03  TPro  7.8  /  Alb  3.8  /  TBili  0.3  /  DBili  x   /  AST  33  /  ALT  11  /  AlkPhos  58  04-04    64yrs, s/p LVAD admitted with COVID presenting with abdo pain and RASHAWN.  concern re falling WBC on day 4 of plaq.   No need to follow QT based upon baseline QTc.   Zi Werner 64yrs, HMII Sept 2017 for DT (severe PAD).   Chronic abdo pain of unclear etiology.   Admitted 3.31 with abdo pain, chills, diahrrea  found to be COVID pos  RASHAWN: basline Cr .96, admission Cr 4.0.  Aleman placed ? reason  CT scan: new ground glass opacities RUL. abdo normal.  Required O2 on admission. Admission CRP 5.1  Managed with plaquenil.   Current meds: plaq 200 bid, asa, coumadin fluid bolus X1. remeron. pepcid.   on March 5th.   LVAD flows: 4.9, 8800, PI 6.   100-110, Tmax 100.9, MAPs 80s  I/O: -300.   CRP 9.6, 10.1, 5.6,   Ferritin 391, 228.   INR 2.1, (3.5 on admission)                         12.9   2.95  )-----------( 96       ( 04 Apr 2020 05:53 )             37.1   WBC: 2.95, 3.03. 4.04, 5.12,   04-04    132<L>  |  99  |  25<H>  ----------------------------<  71  5.2   |  17<L>  |  0.98  Ca    9.0      04 Apr 2020 05:53  Mg     2.5     04-03  TPro  7.8  /  Alb  3.8  /  TBili  0.3  /  DBili  x   /  AST  33  /  ALT  11  /  AlkPhos  58  04-04    64yrs, s/p LVAD admitted with COVID presenting with abdo pain and RASHAWN.  concern re falling WBC on day 4 of plaq.   No need to follow QT based upon baseline QTc.   Resume BPH meds in an effort to remove aleman.   Zi Werner

## 2020-04-05 LAB
ANION GAP SERPL CALC-SCNC: 16 MMOL/L — SIGNIFICANT CHANGE UP (ref 5–17)
APTT BLD: 39.4 SEC — HIGH (ref 27.5–36.3)
BUN SERPL-MCNC: 25 MG/DL — HIGH (ref 7–23)
CALCIUM SERPL-MCNC: 8.8 MG/DL — SIGNIFICANT CHANGE UP (ref 8.4–10.5)
CHLORIDE SERPL-SCNC: 99 MMOL/L — SIGNIFICANT CHANGE UP (ref 96–108)
CO2 SERPL-SCNC: 16 MMOL/L — LOW (ref 22–31)
CREAT SERPL-MCNC: 0.95 MG/DL — SIGNIFICANT CHANGE UP (ref 0.5–1.3)
CRP SERPL-MCNC: 9.54 MG/DL — HIGH (ref 0–0.4)
GLUCOSE SERPL-MCNC: 86 MG/DL — SIGNIFICANT CHANGE UP (ref 70–99)
HCT VFR BLD CALC: 42.2 % — SIGNIFICANT CHANGE UP (ref 39–50)
HGB BLD-MCNC: 13.6 G/DL — SIGNIFICANT CHANGE UP (ref 13–17)
INR BLD: 2.76 RATIO — HIGH (ref 0.88–1.16)
MCHC RBC-ENTMCNC: 28.4 PG — SIGNIFICANT CHANGE UP (ref 27–34)
MCHC RBC-ENTMCNC: 32.2 GM/DL — SIGNIFICANT CHANGE UP (ref 32–36)
MCV RBC AUTO: 88.1 FL — SIGNIFICANT CHANGE UP (ref 80–100)
NRBC # BLD: 0 /100 WBCS — SIGNIFICANT CHANGE UP (ref 0–0)
PLATELET # BLD AUTO: 127 K/UL — LOW (ref 150–400)
POTASSIUM SERPL-MCNC: 5.1 MMOL/L — SIGNIFICANT CHANGE UP (ref 3.5–5.3)
POTASSIUM SERPL-SCNC: 5.1 MMOL/L — SIGNIFICANT CHANGE UP (ref 3.5–5.3)
PROTHROM AB SERPL-ACNC: 32.8 SEC — HIGH (ref 10–13.1)
RBC # BLD: 4.79 M/UL — SIGNIFICANT CHANGE UP (ref 4.2–5.8)
RBC # FLD: 13.6 % — SIGNIFICANT CHANGE UP (ref 10.3–14.5)
SODIUM SERPL-SCNC: 131 MMOL/L — LOW (ref 135–145)
WBC # BLD: 3.14 K/UL — LOW (ref 3.8–10.5)
WBC # FLD AUTO: 3.14 K/UL — LOW (ref 3.8–10.5)

## 2020-04-05 PROCEDURE — 99233 SBSQ HOSP IP/OBS HIGH 50: CPT

## 2020-04-05 PROCEDURE — 99232 SBSQ HOSP IP/OBS MODERATE 35: CPT

## 2020-04-05 RX ORDER — WARFARIN SODIUM 2.5 MG/1
1 TABLET ORAL ONCE
Refills: 0 | Status: DISCONTINUED | OUTPATIENT
Start: 2020-04-05 | End: 2020-04-05

## 2020-04-05 RX ORDER — WARFARIN SODIUM 2.5 MG/1
2 TABLET ORAL ONCE
Refills: 0 | Status: COMPLETED | OUTPATIENT
Start: 2020-04-05 | End: 2020-04-05

## 2020-04-05 RX ORDER — WARFARIN SODIUM 2.5 MG/1
2 TABLET ORAL ONCE
Refills: 0 | Status: DISCONTINUED | OUTPATIENT
Start: 2020-04-05 | End: 2020-04-05

## 2020-04-05 RX ADMIN — Medication 200 MILLIGRAM(S): at 22:01

## 2020-04-05 RX ADMIN — SODIUM CHLORIDE 3 MILLILITER(S): 9 INJECTION INTRAMUSCULAR; INTRAVENOUS; SUBCUTANEOUS at 13:40

## 2020-04-05 RX ADMIN — Medication 200 MILLIGRAM(S): at 11:44

## 2020-04-05 RX ADMIN — Medication 650 MILLIGRAM(S): at 02:39

## 2020-04-05 RX ADMIN — WARFARIN SODIUM 2 MILLIGRAM(S): 2.5 TABLET ORAL at 22:01

## 2020-04-05 RX ADMIN — Medication 81 MILLIGRAM(S): at 11:43

## 2020-04-05 RX ADMIN — MIRTAZAPINE 7.5 MILLIGRAM(S): 45 TABLET, ORALLY DISINTEGRATING ORAL at 22:01

## 2020-04-05 RX ADMIN — SODIUM CHLORIDE 3 MILLILITER(S): 9 INJECTION INTRAMUSCULAR; INTRAVENOUS; SUBCUTANEOUS at 21:10

## 2020-04-05 RX ADMIN — TAMSULOSIN HYDROCHLORIDE 0.4 MILLIGRAM(S): 0.4 CAPSULE ORAL at 22:01

## 2020-04-05 RX ADMIN — FINASTERIDE 5 MILLIGRAM(S): 5 TABLET, FILM COATED ORAL at 11:44

## 2020-04-05 RX ADMIN — FAMOTIDINE 20 MILLIGRAM(S): 10 INJECTION INTRAVENOUS at 11:44

## 2020-04-05 RX ADMIN — SODIUM CHLORIDE 3 MILLILITER(S): 9 INJECTION INTRAMUSCULAR; INTRAVENOUS; SUBCUTANEOUS at 05:09

## 2020-04-05 RX ADMIN — CHLORHEXIDINE GLUCONATE 1 APPLICATION(S): 213 SOLUTION TOPICAL at 11:43

## 2020-04-05 NOTE — PROGRESS NOTE ADULT - ATTENDING COMMENTS
No events.   meds: plaquenil day 5, asa, coumadin, fenestride, tanzaolcin, remeron, famotidine. asa,   Tmax 101, 100SR, MAP 70-80. 90% RA.   LVAD: 5.1, speed 8800, PI 6.4, Power 4.9.   I/O: -100  CRP: pending   04-05    131<L>  |  99  |  25<H>  ----------------------------<  86  5.1   |  16<L>  |  0.95  Ca    8.8      05 Apr 2020 07:05  Mg     2.5     04-03  TPro  7.8  /  Alb  3.8  /  TBili  0.3  /  DBili  x   /  AST  33  /  ALT  11  /  AlkPhos  58  04-04                        13.6   3.14  )-----------( 127      ( 05 Apr 2020 07:06 )             42.2   WBC: 3.1, 2.9,   INR: 2.7  Clinically Stable last day of plaquenil  Adjust coumadin 2/day  Follow UO after d/c jennifer. Possible d/c tomorrow.   Zi Werner No events. c/o abdo pain and low back pain which I understand is chronic.   meds: plaquenil day 5, asa, coumadin, fenestride, tanzaolcin, remeron, famotidine. asa,   Tmax 101, 100SR, MAP 70-80. 90% RA.   LVAD: 5.1, speed 8800, PI 6.4, Power 4.9.   I/O: -100  CRP: pending   04-05    131<L>  |  99  |  25<H>  ----------------------------<  86  5.1   |  16<L>  |  0.95  Ca    8.8      05 Apr 2020 07:05  Mg     2.5     04-03  TPro  7.8  /  Alb  3.8  /  TBili  0.3  /  DBili  x   /  AST  33  /  ALT  11  /  AlkPhos  58  04-04                        13.6   3.14  )-----------( 127      ( 05 Apr 2020 07:06 )             42.2   WBC: 3.1, 2.9,   INR: 2.7  Clinically Stable last day of plaquenil  Adjust coumadin 2/day  Follow UO after d/c jennifer. Possible d/c tomorrow.   Zi Werner

## 2020-04-05 NOTE — PROGRESS NOTE ADULT - PROBLEM SELECTOR PLAN 2
- Improving with fluid resuscitation   - Continue to monitor  -resume home BPH meds and discontinue aleman - Improving with fluid resuscitation   - Continue to monitor  - Continue home BPH meds and discontinue aleman. Voiding trial

## 2020-04-05 NOTE — PROGRESS NOTE ADULT - SUBJECTIVE AND OBJECTIVE BOX
Subjective: Pt states "Hello" denies any CP or SOB. No acute events overnight.     Telemetry:  -120  Vital Signs Last 24 Hrs  T(C): 37.3 (04-05-20 @ 12:34), Max: 38.3 (04-04-20 @ 16:46)  T(F): 99.2 (04-05-20 @ 12:34), Max: 101 (04-04-20 @ 16:46)  HR: 120 (04-05-20 @ 12:34) (111 - 120)  BP: 108/67 (04-05-20 @ 04:14) (108/67 - 108/67)  RR: 18 (04-05-20 @ 12:34) (18 - 19)  SpO2: 95% (04-05-20 @ 12:34) (95% - 99%)             04-04 @ 07:01  -  04-05 @ 07:00  --------------------------------------------------------  IN: 470 mL / OUT: 510 mL / NET: -40 mL                          13.6   3.14  )-----------( 127      ( 05 Apr 2020 07:06 )             42.2     131<L>  |  99  |  25<H>  ----------------------------<  86  5.1   |  16<L>  |  0.95            MEDICATIONS  acetaminophen   Tablet .. 650 milliGRAM(s) Oral every 6 hours PRN  aspirin enteric coated 81 milliGRAM(s) Oral daily  chlorhexidine 4% Liquid 1 Application(s) Topical <User Schedule>  famotidine    Tablet 20 milliGRAM(s) Oral daily  finasteride 5 milliGRAM(s) Oral daily  hydroxychloroquine   Oral   hydroxychloroquine 200 milliGRAM(s) Oral every 12 hours  mirtazapine 7.5 milliGRAM(s) Oral at bedtime  sodium chloride 0.9% lock flush 3 milliLiter(s) IV Push every 8 hours  tamsulosin 0.4 milliGRAM(s) Oral at bedtime  warfarin 2 milliGRAM(s) Oral once        Discussed with Cardiothoracic Team at AM rounds.

## 2020-04-05 NOTE — PROGRESS NOTE ADULT - SUBJECTIVE AND OBJECTIVE BOX
Subjective: No overnight events.         Current Meds:  acetaminophen   Tablet .. 650 milliGRAM(s) Oral every 6 hours PRN  aspirin enteric coated 81 milliGRAM(s) Oral daily  chlorhexidine 4% Liquid 1 Application(s) Topical <User Schedule>  famotidine    Tablet 20 milliGRAM(s) Oral daily  finasteride 5 milliGRAM(s) Oral daily  hydroxychloroquine   Oral   hydroxychloroquine 200 milliGRAM(s) Oral every 12 hours  mirtazapine 7.5 milliGRAM(s) Oral at bedtime  sodium chloride 0.9% lock flush 3 milliLiter(s) IV Push every 8 hours  tamsulosin 0.4 milliGRAM(s) Oral at bedtime      Vitals:  T(C): 37 (04-05-20 @ 04:14), Max: 38.3 (04-04-20 @ 16:46)  T(F): 98.6 (04-05-20 @ 04:14), Max: 101 (04-04-20 @ 16:46)  HR: 118 (04-05-20 @ 04:14) (111 - 118)  BP: 108/67 (04-05-20 @ 04:14) (108/67 - 108/67)  RR: 19 (04-05-20 @ 04:14) (18 - 19)  SpO2: 99% (04-05-20 @ 04:14) (98% - 99%)      I&O's Summary    04 Apr 2020 07:01  -  05 Apr 2020 07:00  --------------------------------------------------------  IN: 470 mL / OUT: 510 mL / NET: -40 mL      physical exam to be performed by attending only to minimize contact during pandemic:  General: No distress. Comfortable.  HEENT: EOM intact.  Neck: Neck supple. JVP not elevated. No masses  Chest: Clear to auscultation bilaterally  CV: Typical LVAD sounds. No distal pulses  Abdomen: Soft, non-distended, but tender to palpation throughout.   Driveline exit site: Clean, dry, and intact  Skin: No rashes or skin breakdown  Neurology: Alert and oriented times three. Sensation intact  Psych: Affect normal    LVAD Interrogation: Heart Mate II  RPMs: 8800  Power: 5.1  Flow: 4.9  PI: 6  Event: Frequent PI events.   No programming changes were made                                    13.6   3.14  )-----------( 127      ( 05 Apr 2020 07:06 )             42.2     04-05    131<L>  |  99  |  25<H>  ----------------------------<  86  5.1   |  16<L>  |  0.95    Ca    8.8      05 Apr 2020 07:05  Mg     2.5     04-03    TPro  7.8  /  Alb  3.8  /  TBili  0.3  /  DBili  x   /  AST  33  /  ALT  11  /  AlkPhos  58  04-04    PT/INR - ( 04 Apr 2020 05:53 )   PT: 24.1 sec;   INR: 2.07 ratio           CARDIAC MARKERS ( 31 Mar 2020 19:57 )  683 ng/L / x     / x     / 336 U/L / x     / 15.7 ng/mL  CARDIAC MARKERS ( 31 Mar 2020 16:49 )  568 ng/L / x     / x     / 318 U/L / x     / 12.2 ng/mL      Serum Pro-Brain Natriuretic Peptide: 397 pg/mL (04-04 @ 05:53)                           Lactate Dehydrogenase, Serum: 391 U/L (04.04.20 @ 05:53)  Lactate Dehydrogenase, Serum: 357 U/L (04-03 @ 10:44)  Lactate Dehydrogenase, Serum: 319 U/L (04-02 @ 11:09)  Lactate Dehydrogenase, Serum: 320 U/L (04-02 @ 06:23)  Lactate Dehydrogenase, Serum: 328 U/L (04-01 @ 10:26)  Lactate Dehydrogenase, Serum: 337 U/L (03-31 @ 20:00) Subjective: No overnight events. Complaining of chronic abdominal and back pain.        Tele: -120's no reported ectopies        Current Meds:  acetaminophen   Tablet .. 650 milliGRAM(s) Oral every 6 hours PRN  aspirin enteric coated 81 milliGRAM(s) Oral daily  chlorhexidine 4% Liquid 1 Application(s) Topical <User Schedule>  famotidine    Tablet 20 milliGRAM(s) Oral daily  finasteride 5 milliGRAM(s) Oral daily  hydroxychloroquine   Oral   hydroxychloroquine 200 milliGRAM(s) Oral every 12 hours  mirtazapine 7.5 milliGRAM(s) Oral at bedtime  sodium chloride 0.9% lock flush 3 milliLiter(s) IV Push every 8 hours  tamsulosin 0.4 milliGRAM(s) Oral at bedtime      Vitals:  T(C): 37 (04-05-20 @ 04:14), Max: 38.3 (04-04-20 @ 16:46)  T(F): 98.6 (04-05-20 @ 04:14), Max: 101 (04-04-20 @ 16:46)  HR: 118 (04-05-20 @ 04:14) (111 - 118)  BP: 108/67 (04-05-20 @ 04:14) (108/67 - 108/67)  RR: 19 (04-05-20 @ 04:14) (18 - 19)  SpO2: 99% (04-05-20 @ 04:14) (98% - 99%)      I&O's Summary    04 Apr 2020 07:01  -  05 Apr 2020 07:00  --------------------------------------------------------  IN: 470 mL / OUT: 510 mL / NET: -40 mL      physical exam to be performed by attending only to minimize contact during pandemic:  General: No distress. Comfortable.  HEENT: EOM intact.  Neck: Neck supple. JVP not elevated. No masses  Chest: Clear to auscultation bilaterally  CV: Typical LVAD sounds. No distal pulses  Abdomen: Soft, non-distended, but tender to palpation throughout.   Driveline exit site: Clean, dry, and intact  Skin: No rashes or skin breakdown  Neurology: Alert and oriented times three. Sensation intact  Psych: Affect normal    LVAD Interrogation: Heart Mate II - not interrogated today  RPMs: 8800  Power: 5.1  Flow: 4.9  PI: 6  Event: Frequent PI events.   No programming changes were made                                    13.6   3.14  )-----------( 127      ( 05 Apr 2020 07:06 )             42.2     04-05    131<L>  |  99  |  25<H>  ----------------------------<  86  5.1   |  16<L>  |  0.95    Ca    8.8      05 Apr 2020 07:05  Mg     2.5     04-03    TPro  7.8  /  Alb  3.8  /  TBili  0.3  /  DBili  x   /  AST  33  /  ALT  11  /  AlkPhos  58  04-04    PT/INR - ( 04 Apr 2020 05:53 )   PT: 24.1 sec;   INR: 2.07 ratio           CARDIAC MARKERS ( 31 Mar 2020 19:57 )  683 ng/L / x     / x     / 336 U/L / x     / 15.7 ng/mL  CARDIAC MARKERS ( 31 Mar 2020 16:49 )  568 ng/L / x     / x     / 318 U/L / x     / 12.2 ng/mL      Serum Pro-Brain Natriuretic Peptide: 397 pg/mL (04-04 @ 05:53)                           Lactate Dehydrogenase, Serum: 391 U/L (04.04.20 @ 05:53)  Lactate Dehydrogenase, Serum: 357 U/L (04-03 @ 10:44)  Lactate Dehydrogenase, Serum: 319 U/L (04-02 @ 11:09)  Lactate Dehydrogenase, Serum: 320 U/L (04-02 @ 06:23)  Lactate Dehydrogenase, Serum: 328 U/L (04-01 @ 10:26)  Lactate Dehydrogenase, Serum: 337 U/L (03-31 @ 20:00)

## 2020-04-05 NOTE — PROGRESS NOTE ADULT - ASSESSMENT
Mr. Quispe is a 64 year old man with ACC/AHA stage D HF due to NICM, s/p HM2 LVAD  in 9/12/17 as destination therapy due to severe peripheral artery disease with significant stenosis. He currently presents with COVID-19 accompanied by abdominal pain, diarrhea and acute renal failure.     He is gradually improving. His acute renal failure has resolved and he is hemodynamically stable. He continues to be intermittently febrile, but he is not hypoxicc. ID following and he has been started on Plaquenil. He is without evidence of LVAD malfunction.     INCOMPLETE NOTE Mr. Quispe is a 64 year old man with ACC/AHA stage D HF due to NICM, s/p HM2 LVAD  in 9/12/17 as destination therapy due to severe peripheral artery disease with significant stenosis. He currently presents with COVID-19 accompanied by abdominal pain, diarrhea and acute renal failure.     He is gradually improving. His acute renal failure has resolved and he is hemodynamically stable. Not hypoxic. Clinically stable.  ID following and he has been started on Plaquenil. He is without evidence of LVAD malfunction. Possible Discharge tomorrow if he remains stable.

## 2020-04-05 NOTE — PROGRESS NOTE ADULT - PROBLEM SELECTOR PLAN 4
- Continue to dose coumadin for goal INR of 2-3.   - Continue ASA - Continue to dose coumadin for goal INR of 2-3. Dose coumadin 2mg tonight.  - Continue ASA

## 2020-04-05 NOTE — PROGRESS NOTE ADULT - ASSESSMENT
This a 65 y/o male PMH ACC/AHA stage D HF due to NICM HM2 LVAD, TV annuloplasty ring 9/12/17 as destination therapy due to severe peripheral artery disease with significant stenosis, SIADH, Depression, CKD-3, with hyperkalemia past E. coli UTIs. Today he reports generalized weakness since 4 days, chills since 3 days, non-specific abdominal pain and constipation since 2 days with loose stools today.  Reports +Covid-19 sick contacts at home. Denies cough, difficulty breathing, chest pain, palpitations. Directed to [facility] ED r/o COVID. Seen by ID -BCx & urine Cx sent, COVID 19 PCR positive- started on Plaquenil, cefepime and flagyl. Renal function improved with volume resuscitation, home BP medications currently on hold.   4/2: VSS, Per ID d/c cefepime and flagyl today. Continue Plaquenil taper. INR 4.27 holding Coumadin tonight, Of note overnight patient had two febrile episodes, tylenol was given. MAPs went down to 30, IV fluid was given and MAPs improved.   4/3 temp overnight max 101.5- tylenol prn; wbc 3; CRP 5.6 today; continue to monitor closely on plaquenil;  MAP's improved today 70's- IV fluid 500 cc normal saline as per Dr. Iyer   continue to monitor O2 sats: 98% RA ; anticoagulation with coumadin INR 3.08- coumadin 2 mg this evening  4/4 VSS ; tmax 100.9 overnight;  MAP 80's; resume bph meds today and d/c love sun/ mon  anticoagulation with coumadin INR 2.0- coumadin 2.5 mg this evening;  today, CRP 10.1- plaquenil taper as per ID, continue to monitor closely   4/5 VSS, complete Plaquenil taper tonight, RA O2 Sat 96%, INR 2.76, Coumadin 2mg per HF. Love miranda'd today. Possible d/c home Monday if stable.

## 2020-04-05 NOTE — PROGRESS NOTE ADULT - PROBLEM SELECTOR PLAN 2
Heart Failure following  Continue asa 81 daily  BP medications on hold - will resume if MAPs improved  Continue AC on Coumadin daily  Continue to monitor daily labs CBC/CMP/INR/LDH

## 2020-04-06 LAB
ALBUMIN SERPL ELPH-MCNC: 3.6 G/DL — SIGNIFICANT CHANGE UP (ref 3.3–5)
ALP SERPL-CCNC: 65 U/L — SIGNIFICANT CHANGE UP (ref 40–120)
ALT FLD-CCNC: 14 U/L — SIGNIFICANT CHANGE UP (ref 10–45)
ANION GAP SERPL CALC-SCNC: 17 MMOL/L — SIGNIFICANT CHANGE UP (ref 5–17)
AST SERPL-CCNC: 38 U/L — SIGNIFICANT CHANGE UP (ref 10–40)
BASOPHILS # BLD AUTO: 0.01 K/UL — SIGNIFICANT CHANGE UP (ref 0–0.2)
BASOPHILS NFR BLD AUTO: 0.3 % — SIGNIFICANT CHANGE UP (ref 0–2)
BILIRUB SERPL-MCNC: 0.2 MG/DL — SIGNIFICANT CHANGE UP (ref 0.2–1.2)
BUN SERPL-MCNC: 26 MG/DL — HIGH (ref 7–23)
CALCIUM SERPL-MCNC: 9 MG/DL — SIGNIFICANT CHANGE UP (ref 8.4–10.5)
CHLORIDE SERPL-SCNC: 98 MMOL/L — SIGNIFICANT CHANGE UP (ref 96–108)
CO2 SERPL-SCNC: 16 MMOL/L — LOW (ref 22–31)
CREAT SERPL-MCNC: 0.89 MG/DL — SIGNIFICANT CHANGE UP (ref 0.5–1.3)
CRP SERPL-MCNC: 10.39 MG/DL — HIGH (ref 0–0.4)
EOSINOPHIL # BLD AUTO: 0.02 K/UL — SIGNIFICANT CHANGE UP (ref 0–0.5)
EOSINOPHIL NFR BLD AUTO: 0.5 % — SIGNIFICANT CHANGE UP (ref 0–6)
GLUCOSE SERPL-MCNC: 99 MG/DL — SIGNIFICANT CHANGE UP (ref 70–99)
HCT VFR BLD CALC: 43 % — SIGNIFICANT CHANGE UP (ref 39–50)
HGB BLD-MCNC: 14 G/DL — SIGNIFICANT CHANGE UP (ref 13–17)
IMM GRANULOCYTES NFR BLD AUTO: 1.3 % — SIGNIFICANT CHANGE UP (ref 0–1.5)
INR BLD: 4.12 RATIO — HIGH (ref 0.88–1.16)
LDH SERPL L TO P-CCNC: 457 U/L — HIGH (ref 50–242)
LYMPHOCYTES # BLD AUTO: 0.92 K/UL — LOW (ref 1–3.3)
LYMPHOCYTES # BLD AUTO: 23.5 % — SIGNIFICANT CHANGE UP (ref 13–44)
MCHC RBC-ENTMCNC: 28.2 PG — SIGNIFICANT CHANGE UP (ref 27–34)
MCHC RBC-ENTMCNC: 32.6 GM/DL — SIGNIFICANT CHANGE UP (ref 32–36)
MCV RBC AUTO: 86.5 FL — SIGNIFICANT CHANGE UP (ref 80–100)
MONOCYTES # BLD AUTO: 0.48 K/UL — SIGNIFICANT CHANGE UP (ref 0–0.9)
MONOCYTES NFR BLD AUTO: 12.3 % — SIGNIFICANT CHANGE UP (ref 2–14)
NEUTROPHILS # BLD AUTO: 2.43 K/UL — SIGNIFICANT CHANGE UP (ref 1.8–7.4)
NEUTROPHILS NFR BLD AUTO: 62.1 % — SIGNIFICANT CHANGE UP (ref 43–77)
NRBC # BLD: 0 /100 WBCS — SIGNIFICANT CHANGE UP (ref 0–0)
PLATELET # BLD AUTO: 167 K/UL — SIGNIFICANT CHANGE UP (ref 150–400)
POTASSIUM SERPL-MCNC: 5 MMOL/L — SIGNIFICANT CHANGE UP (ref 3.5–5.3)
POTASSIUM SERPL-SCNC: 5 MMOL/L — SIGNIFICANT CHANGE UP (ref 3.5–5.3)
PROT SERPL-MCNC: 8.2 G/DL — SIGNIFICANT CHANGE UP (ref 6–8.3)
PROTHROM AB SERPL-ACNC: 49.1 SEC — HIGH (ref 10–12.9)
RBC # BLD: 4.97 M/UL — SIGNIFICANT CHANGE UP (ref 4.2–5.8)
RBC # FLD: 13.5 % — SIGNIFICANT CHANGE UP (ref 10.3–14.5)
SODIUM SERPL-SCNC: 131 MMOL/L — LOW (ref 135–145)
WBC # BLD: 3.91 K/UL — SIGNIFICANT CHANGE UP (ref 3.8–10.5)
WBC # FLD AUTO: 3.91 K/UL — SIGNIFICANT CHANGE UP (ref 3.8–10.5)

## 2020-04-06 PROCEDURE — 93750 INTERROGATION VAD IN PERSON: CPT

## 2020-04-06 PROCEDURE — 99232 SBSQ HOSP IP/OBS MODERATE 35: CPT | Mod: GC

## 2020-04-06 PROCEDURE — 99233 SBSQ HOSP IP/OBS HIGH 50: CPT | Mod: 25

## 2020-04-06 PROCEDURE — 99232 SBSQ HOSP IP/OBS MODERATE 35: CPT

## 2020-04-06 RX ORDER — LISINOPRIL 2.5 MG/1
10 TABLET ORAL DAILY
Refills: 0 | Status: DISCONTINUED | OUTPATIENT
Start: 2020-04-06 | End: 2020-04-07

## 2020-04-06 RX ORDER — SODIUM CHLORIDE 9 MG/ML
500 INJECTION INTRAMUSCULAR; INTRAVENOUS; SUBCUTANEOUS ONCE
Refills: 0 | Status: COMPLETED | OUTPATIENT
Start: 2020-04-06 | End: 2020-04-06

## 2020-04-06 RX ORDER — SENNA PLUS 8.6 MG/1
2 TABLET ORAL AT BEDTIME
Refills: 0 | Status: DISCONTINUED | OUTPATIENT
Start: 2020-04-06 | End: 2020-04-20

## 2020-04-06 RX ORDER — POLYETHYLENE GLYCOL 3350 17 G/17G
17 POWDER, FOR SOLUTION ORAL DAILY
Refills: 0 | Status: DISCONTINUED | OUTPATIENT
Start: 2020-04-06 | End: 2020-04-13

## 2020-04-06 RX ADMIN — POLYETHYLENE GLYCOL 3350 17 GRAM(S): 17 POWDER, FOR SOLUTION ORAL at 14:00

## 2020-04-06 RX ADMIN — SODIUM CHLORIDE 3 MILLILITER(S): 9 INJECTION INTRAMUSCULAR; INTRAVENOUS; SUBCUTANEOUS at 11:06

## 2020-04-06 RX ADMIN — TAMSULOSIN HYDROCHLORIDE 0.4 MILLIGRAM(S): 0.4 CAPSULE ORAL at 22:32

## 2020-04-06 RX ADMIN — FINASTERIDE 5 MILLIGRAM(S): 5 TABLET, FILM COATED ORAL at 10:05

## 2020-04-06 RX ADMIN — FAMOTIDINE 20 MILLIGRAM(S): 10 INJECTION INTRAVENOUS at 10:05

## 2020-04-06 RX ADMIN — Medication 81 MILLIGRAM(S): at 10:05

## 2020-04-06 RX ADMIN — SODIUM CHLORIDE 500 MILLILITER(S): 9 INJECTION INTRAMUSCULAR; INTRAVENOUS; SUBCUTANEOUS at 17:32

## 2020-04-06 RX ADMIN — SODIUM CHLORIDE 3 MILLILITER(S): 9 INJECTION INTRAMUSCULAR; INTRAVENOUS; SUBCUTANEOUS at 05:09

## 2020-04-06 RX ADMIN — CHLORHEXIDINE GLUCONATE 1 APPLICATION(S): 213 SOLUTION TOPICAL at 05:09

## 2020-04-06 RX ADMIN — LISINOPRIL 10 MILLIGRAM(S): 2.5 TABLET ORAL at 17:32

## 2020-04-06 RX ADMIN — SODIUM CHLORIDE 3 MILLILITER(S): 9 INJECTION INTRAMUSCULAR; INTRAVENOUS; SUBCUTANEOUS at 22:31

## 2020-04-06 RX ADMIN — MIRTAZAPINE 7.5 MILLIGRAM(S): 45 TABLET, ORALLY DISINTEGRATING ORAL at 22:32

## 2020-04-06 RX ADMIN — SENNA PLUS 2 TABLET(S): 8.6 TABLET ORAL at 22:32

## 2020-04-06 NOTE — PROGRESS NOTE ADULT - ATTENDING COMMENTS
Will IV fluid bolus given poor PO intake. Otherwise stable.     Please call me with questions at [phone].

## 2020-04-06 NOTE — PROGRESS NOTE ADULT - ASSESSMENT
This a 63 y/o male PMH ACC/AHA stage D HF due to NICM HM2 LVAD, TV annuloplasty ring 9/12/17 as destination therapy due to severe peripheral artery disease with significant stenosis, SIADH, Depression, CKD-3, with hyperkalemia past E. coli UTIs. Today he reports generalized weakness since 4 days, chills since 3 days, non-specific abdominal pain and constipation since 2 days with loose stools today.  Reports +Covid-19 sick contacts at home. Denies cough, difficulty breathing, chest pain, palpitations. Directed to [facility] ED r/o COVID. Seen by ID -BCx & urine Cx sent, COVID 19 PCR positive- started on Plaquenil, cefepime and flagyl. Renal function improved with volume resuscitation, home BP medications currently on hold.   4/2: VSS, Per ID d/c cefepime and flagyl today. Continue Plaquenil taper. INR 4.27 holding Coumadin tonight, Of note overnight patient had two febrile episodes, tylenol was given. MAPs went down to 30, IV fluid was given and MAPs improved.   4/3 temp overnight max 101.5- tylenol prn; wbc 3; CRP 5.6 today; continue to monitor closely on plaquenil;  MAP's improved today 70's- IV fluid 500 cc normal saline as per Dr. Iyer   continue to monitor O2 sats: 98% RA ; anticoagulation with coumadin INR 3.08- coumadin 2 mg this evening  4/4 VSS ; tmax 100.9 overnight;  MAP 80's; resume bph meds today and d/c love sun/ mon  anticoagulation with coumadin INR 2.0- coumadin 2.5 mg this evening;  today, CRP 10.1- plaquenil taper as per ID, continue to monitor closely   4/5 VSS, complete Plaquenil taper tonight, RA O2 Sat 96%, INR 2.76, Coumadin 2mg per HF. Love miranda'd today. Possible d/c home Monday if stable. This a 63 y/o male PMH ACC/AHA stage D HF due to NICM HM2 LVAD, TV annuloplasty ring 9/12/17 as destination therapy due to severe peripheral artery disease with significant stenosis, SIADH, Depression, CKD-3, with hyperkalemia past E. coli UTIs. Today he reports generalized weakness since 4 days, chills since 3 days, non-specific abdominal pain and constipation since 2 days with loose stools today.  Reports +Covid-19 sick contacts at home. Denies cough, difficulty breathing, chest pain, palpitations. Directed to [facility] ED r/o COVID. Seen by ID -BCx & urine Cx sent, COVID 19 PCR positive- started on Plaquenil, cefepime and flagyl. Renal function improved with volume resuscitation, home BP medications currently on hold.   4/2: VSS, Per ID d/c cefepime and flagyl today. Continue Plaquenil taper. INR 4.27 holding Coumadin tonight, Of note overnight patient had two febrile episodes, tylenol was given. MAPs went down to 30, IV fluid was given and MAPs improved.   4/3 temp overnight max 101.5- tylenol prn; wbc 3; CRP 5.6 today; continue to monitor closely on plaquenil;  MAP's improved today 70's- IV fluid 500 cc normal saline as per Dr. Iyer   continue to monitor O2 sats: 98% RA ; anticoagulation with coumadin INR 3.08- coumadin 2 mg this evening  4/4 VSS ; tmax 100.9 overnight;  MAP 80's; resume bph meds today and d/c aleman sun/ mon  anticoagulation with coumadin INR 2.0- coumadin 2.5 mg this evening;  today, CRP 10.1- plaquenil taper as per ID, continue to monitor closely   4/5 VSS, complete Plaquenil taper tonight, RA O2 Sat 96%, INR 2.76, Coumadin 2mg per HF. Aleman dc'd today. Possible d/c home Monday if stable.   4/6 VSS, pt voiding. MAPs>90, resumed back on lisinopril 10 daily, also 500ml NS bolus per HF. INR 4.12, Coumadin on hold tonight.

## 2020-04-06 NOTE — PROGRESS NOTE ADULT - PROBLEM SELECTOR PLAN 2
Heart Failure following  Continue asa 81 daily  BP medications on hold - will resume if MAPs improved  Continue AC on Coumadin daily  Continue to monitor daily labs CBC/CMP/INR/LDH Heart Failure following  Continue asa 81 daily  Continue on lisinopril 10 daily  Continue AC on Coumadin daily  Continue to monitor daily labs CBC/CMP/INR/LDH

## 2020-04-06 NOTE — PROGRESS NOTE ADULT - PROBLEM SELECTOR PLAN 3
- Gentle fluid resuscitation with 500cc NS bolus  - Start Lisinopril 10mg PO Daily, hold for MAPs <70

## 2020-04-06 NOTE — PROGRESS NOTE ADULT - PROBLEM SELECTOR PLAN 3
Continue Flomax 0.4 HS and Proscar 5mg daily  d/c aleman today - due to void Continue Flomax 0.4 HS and Proscar 5mg daily  Pt voiding

## 2020-04-06 NOTE — PROGRESS NOTE ADULT - PROBLEM SELECTOR PLAN 2
- Improved with fluid resuscitation   - Continue home BPH meds with strict documentation of output since removal of aleman

## 2020-04-06 NOTE — PROGRESS NOTE ADULT - ASSESSMENT
ASSESSMENT:  64/M with PMH CHF s/p LVAD Sept 2017 (on Warfarin), CAD, s/p TVR, SIADH, Depression, CKD-3, past E. coli UTIs  P/w generalized weakness since 4 days; chills since 3 days; non-specific abdominal pain and constipation since 2 days and loose stools today  H/o sick contacts with Covid-19 at home. Imaging concerning for new opacities in RUL, Covid-19 PCR pending.  =========  LVAD with Covid-19 infection  - pt denied cough, upper resp symptoms, fevers. Appears comfortable on RA, able to speak full sentences without dyspnea  - However, he has sick contacts with Covid-19 at home and imaging findings of RUL opacities  - Covid-19 Labs - ; Procalcitonin 0.45 elevated ferritin and CRP    RECOMMENDATIONS:  - continue Hydroxychloroquine complete 5 day course  - Continue supplemental O2 and supportive care per primary team  - Continue Contact and Airborne Isolation precautions      Morris Prater MD  [phone]  After 5pm/weekends [phone]

## 2020-04-06 NOTE — PROGRESS NOTE ADULT - PROBLEM SELECTOR PLAN 1
COVID Isolation/ Droplet precautions  ID following  Continue Plaquenil taper  monitor crp/ esr and procalcitonin levels COVID Isolation/ Droplet precautions  ID following  Completed Plaquenil taper  monitor crp/ esr and procalcitonin levels

## 2020-04-06 NOTE — PROGRESS NOTE ADULT - ASSESSMENT
Mr. Quispe is a 64 year old man with ACC/AHA stage D HF due to NICM, s/p HM2 LVAD  in 9/12/17 as destination therapy due to severe peripheral artery disease with significant stenosis. He currently presents with COVID-19 accompanied by abdominal pain, diarrhea and acute renal failure. He completed a 5 day course of Plaquenil on 4/5    He remains afebrile but is tachycardic today with elevated MAPs in the 90s. Passed TOV since removal of Landrum at midnight but with minimal urine output (200cc over 12hrs). His acute renal failure has resolved. He is not hypoxic. He is without evidence of LVAD malfunction.

## 2020-04-06 NOTE — PROGRESS NOTE ADULT - SUBJECTIVE AND OBJECTIVE BOX
Subjective: Pt states "Hello" denies any CP or SOB. No acute events overnight.     Telemetry:    Vital Signs Last 24 Hrs  T(C): 36.9 (04-06-20 @ 14:02), Max: 37.4 (04-05-20 @ 18:15)  T(F): 98.4 (04-06-20 @ 14:02), Max: 99.4 (04-05-20 @ 18:15)  HR: 126 (04-06-20 @ 14:02) (123 - 133)  BP: 98/71 (04-05-20 @ 20:28) (98/71 - 98/71)  RR: 18 (04-06-20 @ 14:02) (18 - 18)  SpO2: 96% (04-06-20 @ 14:02) (93% - 97%)             04-05 @ 07:01  -  04-06 @ 07:00  --------------------------------------------------------  IN: 640 mL / OUT: 800 mL / NET: -160 mL    04-06 @ 07:01  -  04-06 @ 15:40  --------------------------------------------------------  IN: 0 mL / OUT: 0 mL / NET: 0 mL        Daily     Daily                         14.0   3.91  )-----------( 167      ( 06 Apr 2020 06:28 )             43.0     04-06    131<L>  |  98  |  26<H>  ----------------------------<  99  5.0   |  16<L>  |  0.89    Ca    9.0      06 Apr 2020 06:28    TPro  8.2  /  Alb  3.6  /  TBili  0.2  /  DBili  x   /  AST  38  /  ALT  14  /  AlkPhos  65  04-06        CAPILLARY BLOOD GLUCOSE            MEDICATIONS  acetaminophen   Tablet .. 650 milliGRAM(s) Oral every 6 hours PRN  aspirin enteric coated 81 milliGRAM(s) Oral daily  chlorhexidine 4% Liquid 1 Application(s) Topical <User Schedule>  famotidine    Tablet 20 milliGRAM(s) Oral daily  finasteride 5 milliGRAM(s) Oral daily  lisinopril 10 milliGRAM(s) Oral daily  mirtazapine 7.5 milliGRAM(s) Oral at bedtime  polyethylene glycol 3350 17 Gram(s) Oral daily PRN  senna 2 Tablet(s) Oral at bedtime  sodium chloride 0.9% Bolus 500 milliLiter(s) IV Bolus once  sodium chloride 0.9% lock flush 3 milliLiter(s) IV Push every 8 hours  tamsulosin 0.4 milliGRAM(s) Oral at bedtime      Physical Therapy Rec:   Home  [  ]   Home w/ PT  [  ]  Rehab  [  ]    Discussed with Cardiothoracic Team at AM rounds. Subjective: Pt states "Hello" denies any CP or SOB. No acute events overnight.     Telemetry:   - 140  Vital Signs Last 24 Hrs  T(C): 36.9 (04-06-20 @ 14:02), Max: 37.4 (04-05-20 @ 18:15)  T(F): 98.4 (04-06-20 @ 14:02), Max: 99.4 (04-05-20 @ 18:15)  HR: 126 (04-06-20 @ 14:02) (123 - 133)  BP: 98/71 (04-05-20 @ 20:28) (98/71 - 98/71)  RR: 18 (04-06-20 @ 14:02) (18 - 18)  SpO2: 96% (04-06-20 @ 14:02) (93% - 97%)             04-05 @ 07:01  -  04-06 @ 07:00  --------------------------------------------------------  IN: 640 mL / OUT: 800 mL / NET: -160 mL                          14.0   3.91  )-----------( 167      ( 06 Apr 2020 06:28 )             43.0     131<L>  |  98  |  26<H>  ----------------------------<  99  5.0   |  16<L>  |  0.89    AST  38  /  ALT  14  /  AlkPhos  65  04-06              MEDICATIONS  acetaminophen   Tablet .. 650 milliGRAM(s) Oral every 6 hours PRN  aspirin enteric coated 81 milliGRAM(s) Oral daily  chlorhexidine 4% Liquid 1 Application(s) Topical <User Schedule>  famotidine    Tablet 20 milliGRAM(s) Oral daily  finasteride 5 milliGRAM(s) Oral daily  lisinopril 10 milliGRAM(s) Oral daily  mirtazapine 7.5 milliGRAM(s) Oral at bedtime  polyethylene glycol 3350 17 Gram(s) Oral daily PRN  senna 2 Tablet(s) Oral at bedtime  sodium chloride 0.9% Bolus 500 milliLiter(s) IV Bolus once  sodium chloride 0.9% lock flush 3 milliLiter(s) IV Push every 8 hours  tamsulosin 0.4 milliGRAM(s) Oral at bedtime      Physical Therapy Rec:   Home  [  ]   Home w/ PT  [  ]  Rehab  [  ]    Discussed with Cardiothoracic Team at AM rounds. Subjective: Pt states "Hello" denies any CP or SOB. No acute events overnight.     Telemetry:   - 140  Vital Signs Last 24 Hrs  T(C): 36.9 (04-06-20 @ 14:02), Max: 37.4 (04-05-20 @ 18:15)  T(F): 98.4 (04-06-20 @ 14:02), Max: 99.4 (04-05-20 @ 18:15)  HR: 126 (04-06-20 @ 14:02) (123 - 133)  BP: 98/71 (04-05-20 @ 20:28) (98/71 - 98/71)  RR: 18 (04-06-20 @ 14:02) (18 - 18)  SpO2: 96% (04-06-20 @ 14:02) (93% - 97%)             04-05 @ 07:01  -  04-06 @ 07:00  --------------------------------------------------------  IN: 640 mL / OUT: 800 mL / NET: -160 mL                          14.0   3.91  )-----------( 167      ( 06 Apr 2020 06:28 )             43.0     131<L>  |  98  |  26<H>  ----------------------------<  99  5.0   |  16<L>  |  0.89    AST  38  /  ALT  14  /  AlkPhos  65  04-06              MEDICATIONS  acetaminophen   Tablet .. 650 milliGRAM(s) Oral every 6 hours PRN  aspirin enteric coated 81 milliGRAM(s) Oral daily  chlorhexidine 4% Liquid 1 Application(s) Topical <User Schedule>  famotidine    Tablet 20 milliGRAM(s) Oral daily  finasteride 5 milliGRAM(s) Oral daily  lisinopril 10 milliGRAM(s) Oral daily  mirtazapine 7.5 milliGRAM(s) Oral at bedtime  polyethylene glycol 3350 17 Gram(s) Oral daily PRN  senna 2 Tablet(s) Oral at bedtime  sodium chloride 0.9% Bolus 500 milliLiter(s) IV Bolus once  sodium chloride 0.9% lock flush 3 milliLiter(s) IV Push every 8 hours  tamsulosin 0.4 milliGRAM(s) Oral at bedtime      Discussed with Cardiothoracic Team at AM rounds.

## 2020-04-06 NOTE — PROGRESS NOTE ADULT - SUBJECTIVE AND OBJECTIVE BOX
INFECTIOUS DISEASES FOLLOW UP-- Anitra Prater  [phone]    This is a follow up note for this  64yMale with  Shortness of breath  COVID-19+    feeling a bit stronger      ROS:  CONSTITUTIONAL:  No fever, good appetite  CARDIOVASCULAR:  No chest pain or palpitations  RESPIRATORY:  No dyspnea  GASTROINTESTINAL:  No nausea, vomiting, diarrhea, or abdominal pain  GENITOURINARY:  No dysuria  NEUROLOGIC:  No headache,     Allergies    No Known Allergies    Intolerances        ANTIBIOTICS/RELEVANT:  antimicrobials    immunologic:    OTHER:  acetaminophen   Tablet .. 650 milliGRAM(s) Oral every 6 hours PRN  aspirin enteric coated 81 milliGRAM(s) Oral daily  chlorhexidine 4% Liquid 1 Application(s) Topical <User Schedule>  famotidine    Tablet 20 milliGRAM(s) Oral daily  finasteride 5 milliGRAM(s) Oral daily  lisinopril 10 milliGRAM(s) Oral daily  mirtazapine 7.5 milliGRAM(s) Oral at bedtime  polyethylene glycol 3350 17 Gram(s) Oral daily PRN  senna 2 Tablet(s) Oral at bedtime  sodium chloride 0.9% lock flush 3 milliLiter(s) IV Push every 8 hours  tamsulosin 0.4 milliGRAM(s) Oral at bedtime      Objective:  Vital Signs Last 24 Hrs  T(C): 36.3 (06 Apr 2020 18:01), Max: 37.1 (06 Apr 2020 05:24)  T(F): 97.4 (06 Apr 2020 18:01), Max: 98.8 (06 Apr 2020 05:24)  HR: 130 (06 Apr 2020 18:01) (123 - 133)  BP: 98/71 (05 Apr 2020 20:28) (98/71 - 98/71)  BP(mean): 98 (06 Apr 2020 18:01) (86 - 98)  RR: 18 (06 Apr 2020 18:01) (18 - 18)  SpO2: 95% (06 Apr 2020 18:01) (93% - 97%)    PHYSICAL EXAM:  Constitutional:no acute distress  Eyes:ALONSO, EOMI  Ear/Nose/Throat: no oral lesions, 	  Respiratory: clear BL  Cardiovascular: S1S2  Gastrointestinal:soft, (+) BS, no tenderness  driveline exit site dressing CDI  Extremities:no e/e/c  No Lymphadenopathy  IV sites not inflammed.    LABS:                        14.0   3.91  )-----------( 167      ( 06 Apr 2020 06:28 )             43.0     04-06    131<L>  |  98  |  26<H>  ----------------------------<  99  5.0   |  16<L>  |  0.89    Ca    9.0      06 Apr 2020 06:28    TPro  8.2  /  Alb  3.6  /  TBili  0.2  /  DBili  x   /  AST  38  /  ALT  14  /  AlkPhos  65  04-06    PT/INR - ( 06 Apr 2020 06:28 )   PT: 49.1 sec;   INR: 4.12 ratio         PTT - ( 05 Apr 2020 09:26 )  PTT:39.4 sec      MICROBIOLOGY:            RECENT CULTURES:  04-01 @ 00:56  .Urine Clean Catch (Midstream)  --  --  --    No growth  --  03-31 @ 21:23  .Blood Blood-Peripheral  --  --  --    No Growth Final  --      RADIOLOGY & ADDITIONAL STUDIES:    < from: Xray Chest 1 View AP/PA (03.31.20 @ 17:27) >  IMPRESSION:   Subtle linear opacities at the right mid lung field correlate to right upper lobe groundglass opacities better characterized on the same day chest CT.    < end of copied text >

## 2020-04-06 NOTE — PROGRESS NOTE ADULT - SUBJECTIVE AND OBJECTIVE BOX
Subjective:  - ongoing abdominal pain, reports last BM was 1 weeks ago  - Landrum removed 12 midnight, voided 200cc 4am    Medications:  acetaminophen   Tablet .. 650 milliGRAM(s) Oral every 6 hours PRN  aspirin enteric coated 81 milliGRAM(s) Oral daily  chlorhexidine 4% Liquid 1 Application(s) Topical <User Schedule>  famotidine    Tablet 20 milliGRAM(s) Oral daily  finasteride 5 milliGRAM(s) Oral daily  mirtazapine 7.5 milliGRAM(s) Oral at bedtime  polyethylene glycol 3350 17 Gram(s) Oral daily PRN  senna 2 Tablet(s) Oral at bedtime  sodium chloride 0.9% lock flush 3 milliLiter(s) IV Push every 8 hours  tamsulosin 0.4 milliGRAM(s) Oral at bedtime      Physical Exam:    Vitals:  Vital Signs Last 24 Hours  T(C): 36.8 (04-06-20 @ 10:00), Max: 37.4 (04-05-20 @ 18:15)  HR: 133 (04-06-20 @ 10:00) (123 - 133)  BP: 98/71 (04-05-20 @ 20:28) (98/71 - 98/71)  RR: 18 (04-06-20 @ 10:00) (18 - 18)  SpO2: 94% (04-06-20 @ 10:00) (93% - 97%)    I&O's Summary    05 Apr 2020 07:01  -  06 Apr 2020 07:00  --------------------------------------------------------  IN: 640 mL / OUT: 800 mL / NET: -160 mL    06 Apr 2020 07:01  -  06 Apr 2020 12:44  --------------------------------------------------------  IN: 0 mL / OUT: 0 mL / NET: 0 mL    Tele: SR/ST -130s     LVAD Interrogation: Heart Mate II   RPMs: 8800  Power: 5.0  Flow: 5.1  PI: 6.7  Event: 2 PI events  No programming changes were made    General: No distress. Comfortable.  HEENT: EOM intact.  Neck: Neck supple. JVP mildly elevated. No masses  Chest: Clear to auscultation bilaterally  CV: Typical LVAD sounds. No distal pulses. No edema  Abdomen: Soft, non-distended, not tender to palpation  Driveline exit site: Clean, dry, and intact  Skin: No rashes or skin breakdown  Neurology: Alert and oriented. Sensation intact  Psych: Affect normal    Labs:                        14.0   3.91  )-----------( 167      ( 06 Apr 2020 06:28 )             43.0     04-06    131<L>  |  98  |  26<H>  ----------------------------<  99  5.0   |  16<L>  |  0.89    Ca    9.0      06 Apr 2020 06:28    TPro  8.2  /  Alb  3.6  /  TBili  0.2  /  DBili  x   /  AST  38  /  ALT  14  /  AlkPhos  65  04-06    PT/INR - ( 06 Apr 2020 06:28 )   PT: 49.1 sec;   INR: 4.12 ratio       PTT - ( 05 Apr 2020 09:26 )  PTT:39.4 sec    Serum Pro-Brain Natriuretic Peptide: 397 pg/mL (04-04 @ 05:53)    Lactate Dehydrogenase, Serum: 457 U/L (04-06 @ 06:28)  Lactate Dehydrogenase, Serum: 391 U/L (04-04 @ 05:53) Subjective:  - ongoing abdominal pain, reports last BM was 1 week ago  - Landrum removed 12 midnight, voided 200cc 4am    Medications:  acetaminophen   Tablet .. 650 milliGRAM(s) Oral every 6 hours PRN  aspirin enteric coated 81 milliGRAM(s) Oral daily  chlorhexidine 4% Liquid 1 Application(s) Topical <User Schedule>  famotidine    Tablet 20 milliGRAM(s) Oral daily  finasteride 5 milliGRAM(s) Oral daily  mirtazapine 7.5 milliGRAM(s) Oral at bedtime  polyethylene glycol 3350 17 Gram(s) Oral daily PRN  senna 2 Tablet(s) Oral at bedtime  sodium chloride 0.9% lock flush 3 milliLiter(s) IV Push every 8 hours  tamsulosin 0.4 milliGRAM(s) Oral at bedtime    Physical Exam:    Vitals:  Vital Signs Last 24 Hours  T(C): 36.8 (04-06-20 @ 10:00), Max: 37.4 (04-05-20 @ 18:15)  HR: 133 (04-06-20 @ 10:00) (123 - 133)  BP: 98/71 (04-05-20 @ 20:28) (98/71 - 98/71)  RR: 18 (04-06-20 @ 10:00) (18 - 18)  SpO2: 94% (04-06-20 @ 10:00) (93% - 97%)    I&O's Summary    05 Apr 2020 07:01  -  06 Apr 2020 07:00  --------------------------------------------------------  IN: 640 mL / OUT: 800 mL / NET: -160 mL    06 Apr 2020 07:01  -  06 Apr 2020 12:44  --------------------------------------------------------  IN: 0 mL / OUT: 0 mL / NET: 0 mL    Tele: SR/ST -130s     LVAD Interrogation: Heart Mate II   RPMs: 8800  Power: 5.0  Flow: 5.1  PI: 6.7  Event: few PI events  No programming changes were made    General: No distress. Comfortable.  HEENT: EOM intact.  Neck: Neck supple. JVP mildly elevated. No masses  Chest: Clear to auscultation bilaterally  CV: Typical LVAD sounds. No distal pulses. No edema  Abdomen: Soft, non-distended, not tender to palpation  Driveline exit site: Clean, dry, and intact  Skin: No rashes or skin breakdown  Neurology: Alert and oriented. Sensation intact  Psych: Affect normal    Labs:                        14.0   3.91  )-----------( 167      ( 06 Apr 2020 06:28 )             43.0     04-06    131<L>  |  98  |  26<H>  ----------------------------<  99  5.0   |  16<L>  |  0.89    Ca    9.0      06 Apr 2020 06:28    TPro  8.2  /  Alb  3.6  /  TBili  0.2  /  DBili  x   /  AST  38  /  ALT  14  /  AlkPhos  65  04-06    PT/INR - ( 06 Apr 2020 06:28 )   PT: 49.1 sec;   INR: 4.12 ratio       PTT - ( 05 Apr 2020 09:26 )  PTT:39.4 sec    Serum Pro-Brain Natriuretic Peptide: 397 pg/mL (04-04 @ 05:53)    Lactate Dehydrogenase, Serum: 457 U/L (04-06 @ 06:28)  Lactate Dehydrogenase, Serum: 391 U/L (04-04 @ 05:53)

## 2020-04-07 LAB
4/8 RATIO: 0.82 RATIO — LOW (ref 0.9–3.6)
A-TUMOR NECROSIS FACT SERPL-MCNC: <5 PG/ML — SIGNIFICANT CHANGE UP
ABS CD8: 158 /UL — SIGNIFICANT CHANGE UP (ref 142–740)
ALBUMIN SERPL ELPH-MCNC: 3.6 G/DL — SIGNIFICANT CHANGE UP (ref 3.3–5)
ALP SERPL-CCNC: 71 U/L — SIGNIFICANT CHANGE UP (ref 40–120)
ALT FLD-CCNC: 27 U/L — SIGNIFICANT CHANGE UP (ref 10–45)
ANION GAP SERPL CALC-SCNC: 20 MMOL/L — HIGH (ref 5–17)
AST SERPL-CCNC: 74 U/L — HIGH (ref 10–40)
BASOPHILS # BLD AUTO: 0.02 K/UL — SIGNIFICANT CHANGE UP (ref 0–0.2)
BASOPHILS NFR BLD AUTO: 0.4 % — SIGNIFICANT CHANGE UP (ref 0–2)
BILIRUB SERPL-MCNC: 0.4 MG/DL — SIGNIFICANT CHANGE UP (ref 0.2–1.2)
BUN SERPL-MCNC: 39 MG/DL — HIGH (ref 7–23)
CALCIUM SERPL-MCNC: 9.4 MG/DL — SIGNIFICANT CHANGE UP (ref 8.4–10.5)
CD3 BLASTS SPEC-ACNC: 296 /UL — LOW (ref 672–1870)
CD3 BLASTS SPEC-ACNC: 45 % — LOW (ref 59–83)
CD4 %: 20 % — LOW (ref 30–62)
CD8 %: 24 % — SIGNIFICANT CHANGE UP (ref 12–36)
CHLORIDE SERPL-SCNC: 98 MMOL/L — SIGNIFICANT CHANGE UP (ref 96–108)
CO2 SERPL-SCNC: 13 MMOL/L — LOW (ref 22–31)
CREAT SERPL-MCNC: 1.13 MG/DL — SIGNIFICANT CHANGE UP (ref 0.5–1.3)
D DIMER BLD IA.RAPID-MCNC: 584 NG/ML DDU — HIGH
EOSINOPHIL # BLD AUTO: 0.04 K/UL — SIGNIFICANT CHANGE UP (ref 0–0.5)
EOSINOPHIL NFR BLD AUTO: 0.8 % — SIGNIFICANT CHANGE UP (ref 0–6)
ERYTHROCYTE [SEDIMENTATION RATE] IN BLOOD: 114 MM/HR — HIGH (ref 0–20)
FERRITIN SERPL-MCNC: 609 NG/ML — HIGH (ref 30–400)
GLUCOSE SERPL-MCNC: 120 MG/DL — HIGH (ref 70–99)
HCT VFR BLD CALC: 41.5 % — SIGNIFICANT CHANGE UP (ref 39–50)
HGB BLD-MCNC: 13.4 G/DL — SIGNIFICANT CHANGE UP (ref 13–17)
IL10 SERPL-MCNC: <5 PG/ML — SIGNIFICANT CHANGE UP
IL12 SERPL-MCNC: <5 PG/ML — SIGNIFICANT CHANGE UP
IL13 SERPL-MCNC: <5 PG/ML — SIGNIFICANT CHANGE UP
IL2 SERPL-MCNC: 543 PG/ML — SIGNIFICANT CHANGE UP
IL2 SERPL-MCNC: <5 PG/ML — SIGNIFICANT CHANGE UP
IL4 SERPL-MCNC: <5 PG/ML — SIGNIFICANT CHANGE UP
IL6 SERPL-MCNC: <5 PG/ML — SIGNIFICANT CHANGE UP
IL8 SERPL-MCNC: <5 PG/ML — SIGNIFICANT CHANGE UP
IMM GRANULOCYTES NFR BLD AUTO: 1.5 % — SIGNIFICANT CHANGE UP (ref 0–1.5)
INR BLD: 4.88 RATIO — HIGH (ref 0.88–1.16)
INTERFERON GAMMA: <5 PG/ML — SIGNIFICANT CHANGE UP
INTERLEUKIN 1 BETA: <5 PG/ML — SIGNIFICANT CHANGE UP
INTERLEUKIN 17: <5 PG/ML — SIGNIFICANT CHANGE UP
INTERLEUKIN 5: <5 PG/ML — SIGNIFICANT CHANGE UP
LYMPHOCYTES # BLD AUTO: 0.85 K/UL — LOW (ref 1–3.3)
LYMPHOCYTES # BLD AUTO: 16.4 % — SIGNIFICANT CHANGE UP (ref 13–44)
MCHC RBC-ENTMCNC: 28.3 PG — SIGNIFICANT CHANGE UP (ref 27–34)
MCHC RBC-ENTMCNC: 32.3 GM/DL — SIGNIFICANT CHANGE UP (ref 32–36)
MCV RBC AUTO: 87.6 FL — SIGNIFICANT CHANGE UP (ref 80–100)
MONOCYTES # BLD AUTO: 0.53 K/UL — SIGNIFICANT CHANGE UP (ref 0–0.9)
MONOCYTES NFR BLD AUTO: 10.2 % — SIGNIFICANT CHANGE UP (ref 2–14)
NEUTROPHILS # BLD AUTO: 3.66 K/UL — SIGNIFICANT CHANGE UP (ref 1.8–7.4)
NEUTROPHILS NFR BLD AUTO: 70.7 % — SIGNIFICANT CHANGE UP (ref 43–77)
NRBC # BLD: 0 /100 WBCS — SIGNIFICANT CHANGE UP (ref 0–0)
PLATELET # BLD AUTO: 204 K/UL — SIGNIFICANT CHANGE UP (ref 150–400)
POTASSIUM SERPL-MCNC: 6 MMOL/L — HIGH (ref 3.5–5.3)
POTASSIUM SERPL-SCNC: 6 MMOL/L — HIGH (ref 3.5–5.3)
PROCALCITONIN SERPL-MCNC: 0.12 NG/ML — HIGH (ref 0.02–0.1)
PROT SERPL-MCNC: 8.7 G/DL — HIGH (ref 6–8.3)
PROTHROM AB SERPL-ACNC: 58.4 SEC — HIGH (ref 10–12.9)
RBC # BLD: 4.74 M/UL — SIGNIFICANT CHANGE UP (ref 4.2–5.8)
RBC # FLD: 13.5 % — SIGNIFICANT CHANGE UP (ref 10.3–14.5)
SODIUM SERPL-SCNC: 131 MMOL/L — LOW (ref 135–145)
T-CELL CD4 SUBSET PNL BLD: 129 /UL — LOW (ref 489–1457)
TROPONIN T, HIGH SENSITIVITY RESULT: 22 NG/L — SIGNIFICANT CHANGE UP (ref 0–51)
TROPONIN T, HIGH SENSITIVITY RESULT: 27 NG/L — SIGNIFICANT CHANGE UP (ref 0–51)
WBC # BLD: 5.18 K/UL — SIGNIFICANT CHANGE UP (ref 3.8–10.5)
WBC # FLD AUTO: 5.18 K/UL — SIGNIFICANT CHANGE UP (ref 3.8–10.5)

## 2020-04-07 PROCEDURE — 93750 INTERROGATION VAD IN PERSON: CPT

## 2020-04-07 PROCEDURE — 99232 SBSQ HOSP IP/OBS MODERATE 35: CPT

## 2020-04-07 PROCEDURE — 99233 SBSQ HOSP IP/OBS HIGH 50: CPT | Mod: 25

## 2020-04-07 RX ORDER — AMIODARONE HYDROCHLORIDE 400 MG/1
200 TABLET ORAL DAILY
Refills: 0 | Status: DISCONTINUED | OUTPATIENT
Start: 2020-04-11 | End: 2020-04-20

## 2020-04-07 RX ORDER — SODIUM CHLORIDE 9 MG/ML
500 INJECTION INTRAMUSCULAR; INTRAVENOUS; SUBCUTANEOUS ONCE
Refills: 0 | Status: COMPLETED | OUTPATIENT
Start: 2020-04-07 | End: 2020-04-07

## 2020-04-07 RX ORDER — SODIUM CHLORIDE 9 MG/ML
500 INJECTION INTRAMUSCULAR; INTRAVENOUS; SUBCUTANEOUS
Refills: 0 | Status: DISCONTINUED | OUTPATIENT
Start: 2020-04-07 | End: 2020-04-07

## 2020-04-07 RX ORDER — FUROSEMIDE 40 MG
40 TABLET ORAL ONCE
Refills: 0 | Status: COMPLETED | OUTPATIENT
Start: 2020-04-07 | End: 2020-04-07

## 2020-04-07 RX ORDER — LISINOPRIL 2.5 MG/1
10 TABLET ORAL EVERY 12 HOURS
Refills: 0 | Status: DISCONTINUED | OUTPATIENT
Start: 2020-04-07 | End: 2020-04-08

## 2020-04-07 RX ORDER — AMIODARONE HYDROCHLORIDE 400 MG/1
400 TABLET ORAL EVERY 8 HOURS
Refills: 0 | Status: COMPLETED | OUTPATIENT
Start: 2020-04-07 | End: 2020-04-11

## 2020-04-07 RX ORDER — SODIUM CHLORIDE 9 MG/ML
1000 INJECTION INTRAMUSCULAR; INTRAVENOUS; SUBCUTANEOUS
Refills: 0 | Status: DISCONTINUED | OUTPATIENT
Start: 2020-04-07 | End: 2020-04-07

## 2020-04-07 RX ORDER — AMIODARONE HYDROCHLORIDE 400 MG/1
TABLET ORAL
Refills: 0 | Status: DISCONTINUED | OUTPATIENT
Start: 2020-04-07 | End: 2020-04-07

## 2020-04-07 RX ORDER — SORBITOL SOLUTION 70 %
15 SOLUTION, ORAL MISCELLANEOUS ONCE
Refills: 0 | Status: COMPLETED | OUTPATIENT
Start: 2020-04-07 | End: 2020-04-07

## 2020-04-07 RX ORDER — AMIODARONE HYDROCHLORIDE 400 MG/1
TABLET ORAL
Refills: 0 | Status: DISCONTINUED | OUTPATIENT
Start: 2020-04-07 | End: 2020-04-20

## 2020-04-07 RX ADMIN — CHLORHEXIDINE GLUCONATE 1 APPLICATION(S): 213 SOLUTION TOPICAL at 09:16

## 2020-04-07 RX ADMIN — LISINOPRIL 10 MILLIGRAM(S): 2.5 TABLET ORAL at 21:44

## 2020-04-07 RX ADMIN — SODIUM CHLORIDE 500 MILLILITER(S): 9 INJECTION INTRAMUSCULAR; INTRAVENOUS; SUBCUTANEOUS at 15:02

## 2020-04-07 RX ADMIN — SODIUM CHLORIDE 3 MILLILITER(S): 9 INJECTION INTRAMUSCULAR; INTRAVENOUS; SUBCUTANEOUS at 09:55

## 2020-04-07 RX ADMIN — MIRTAZAPINE 7.5 MILLIGRAM(S): 45 TABLET, ORALLY DISINTEGRATING ORAL at 21:44

## 2020-04-07 RX ADMIN — AMIODARONE HYDROCHLORIDE 400 MILLIGRAM(S): 400 TABLET ORAL at 21:43

## 2020-04-07 RX ADMIN — SENNA PLUS 2 TABLET(S): 8.6 TABLET ORAL at 21:44

## 2020-04-07 RX ADMIN — FINASTERIDE 5 MILLIGRAM(S): 5 TABLET, FILM COATED ORAL at 09:56

## 2020-04-07 RX ADMIN — FAMOTIDINE 20 MILLIGRAM(S): 10 INJECTION INTRAVENOUS at 09:56

## 2020-04-07 RX ADMIN — Medication 81 MILLIGRAM(S): at 09:56

## 2020-04-07 RX ADMIN — SODIUM CHLORIDE 3 MILLILITER(S): 9 INJECTION INTRAMUSCULAR; INTRAVENOUS; SUBCUTANEOUS at 21:44

## 2020-04-07 RX ADMIN — POLYETHYLENE GLYCOL 3350 17 GRAM(S): 17 POWDER, FOR SOLUTION ORAL at 13:40

## 2020-04-07 RX ADMIN — Medication 40 MILLIGRAM(S): at 22:09

## 2020-04-07 RX ADMIN — LISINOPRIL 10 MILLIGRAM(S): 2.5 TABLET ORAL at 10:37

## 2020-04-07 RX ADMIN — Medication 40 MILLIGRAM(S): at 16:29

## 2020-04-07 RX ADMIN — AMIODARONE HYDROCHLORIDE 400 MILLIGRAM(S): 400 TABLET ORAL at 16:28

## 2020-04-07 RX ADMIN — SODIUM CHLORIDE 3 MILLILITER(S): 9 INJECTION INTRAMUSCULAR; INTRAVENOUS; SUBCUTANEOUS at 06:44

## 2020-04-07 RX ADMIN — TAMSULOSIN HYDROCHLORIDE 0.4 MILLIGRAM(S): 0.4 CAPSULE ORAL at 21:45

## 2020-04-07 RX ADMIN — Medication 15 MILLILITER(S): at 13:40

## 2020-04-07 NOTE — CHART NOTE - NSCHARTNOTEFT_GEN_A_CORE
pt w/ WCT 160s-180s x 1 minute  pt symptomatic-K+ 5.1  spoke w/ Dr. Christiano Iyer- heart failure MD   plan:   1. IV lasix 40mg x 1 now   2. start Amiodarone load  3. repeat CMP & troponin level in 2 hrs - 18:30 -   spoke to RN regarding plan  will continue to monitor pt. closely

## 2020-04-07 NOTE — PROGRESS NOTE ADULT - ASSESSMENT
ASSESSMENT:  64/M with PMH CHF s/p LVAD Sept 2017 (on Warfarin), CAD, s/p TVR, SIADH, Depression, CKD-3, past E. coli UTIs  P/w generalized weakness since 4 days; chills since 3 days; non-specific abdominal pain and constipation since 2 days and loose stools today  H/o sick contacts with Covid-19 at home. Imaging concerning for new opacities in RUL, Covid-19 PCR pending.  =========  LVAD with Covid-19 infection  - pt denied cough, upper resp symptoms, fevers. Appears comfortable on RA, able to speak full sentences without dyspnea  - However, he has sick contacts with Covid-19 at home and imaging findings of RUL opacities  - Covid-19 Labs - ; Procalcitonin 0.45 elevated ferritin and CRP  - completed 5 day course of PO Hydroxychloroquine    RECOMMENDATIONS:  - Continue supplemental O2 and supportive care per primary team  - Continue Contact and Airborne Isolation precautions    ABELARDO Forbes MD  Fellow, Infectious Diseases  Pager: [phone]  After 5pm and On weekends: Call [phone]

## 2020-04-07 NOTE — PROGRESS NOTE ADULT - SUBJECTIVE AND OBJECTIVE BOX
Telemetry:  NSR/-125    Vital Signs Last 24 Hrs  T(C): 36.3 (04-07-20 @ 14:00), Max: 37.6 (04-06-20 @ 22:10)  T(F): 97.4 (04-07-20 @ 14:00), Max: 99.7 (04-06-20 @ 22:10)  HR: 131 (04-07-20 @ 14:00) (125 - 131)  BP: 101/65 (04-07-20 @ 05:23) (101/65 - 101/65)  RR: 18 (04-07-20 @ 14:00) (18 - 18)  SpO2: 95% (04-07-20 @ 14:00) (94% - 95%)             04-06 @ 07:01  -  04-07 @ 07:00  --------------------------------------------------------  IN: 0 mL / OUT: 550 mL / NET: -550 mL    04-07 @ 07:01  -  04-07 @ 14:52  --------------------------------------------------------  IN: 500 mL / OUT: 90 mL / NET: 410 mL      Coumadin    [ x ] YES  - held for supratherapeutic INR of 4.88        [  ]      NO         Reason: LVAD                                13.4   5.18  )-----------( 204      ( 07 Apr 2020 08:38 )             41.5     04-07    133<L>  |  99  |  34<H>  ----------------------------<  105<H>  5.1   |  17<L>  |  1.04    Ca    9.1      07 Apr 2020 08:38    TPro  7.9  /  Alb  3.7  /  TBili  0.3  /  DBili  x   /  AST  33  /  ALT  13  /  AlkPhos  61  04-07    PT/INR - ( 07 Apr 2020 08:38 )   PT: 58.4 sec;   INR: 4.88 ratio        PHYSICAL EXAM: as per ID progress note today 4/7      acetaminophen   Tablet .. 650 milliGRAM(s) Oral every 6 hours PRN  aspirin enteric coated 81 milliGRAM(s) Oral daily  chlorhexidine 4% Liquid 1 Application(s) Topical <User Schedule>  famotidine    Tablet 20 milliGRAM(s) Oral daily  finasteride 5 milliGRAM(s) Oral daily  lisinopril 10 milliGRAM(s) Oral every 12 hours  mirtazapine 7.5 milliGRAM(s) Oral at bedtime  polyethylene glycol 3350 17 Gram(s) Oral daily PRN  senna 2 Tablet(s) Oral at bedtime  sodium chloride 0.9% Bolus 500 milliLiter(s) IV Bolus once  sodium chloride 0.9% lock flush 3 milliLiter(s) IV Push every 8 hours  sodium chloride 0.9%. 500 milliLiter(s) IV Continuous <Continuous>  tamsulosin 0.4 milliGRAM(s) Oral at bedtime      Discussed with Cardiothoracic Team at AM rounds.

## 2020-04-07 NOTE — PROGRESS NOTE ADULT - PROBLEM SELECTOR PLAN 3
- Please administer 500cc NS bolus followed by maitenence fluids of NS @75cc/hr  - Continue Lisinopril 10mg PO BID, hold for MAPs <70 - Please administer 500cc NS bolus followed by maintenance fluids of NS @75cc/hr  - Continue Lisinopril 10mg PO BID, hold for MAPs <70

## 2020-04-07 NOTE — PROGRESS NOTE ADULT - PROBLEM SELECTOR PLAN 2
- Landrum replaced 4/6 for retention  - Improved with fluid resuscitation  - Continue home BPH meds - Landrum replaced 4/6 for retention, continue home BPH meds  - Improved with fluid resuscitation

## 2020-04-07 NOTE — PROGRESS NOTE ADULT - ATTENDING COMMENTS
Mr. Quispe developed ventricular tachycardia this afternoon, which spontaneously broke. We are loading him with amiodarone. We will give IV diuretics. He may need an echocardiogram. Otherwise, he is stable, but his inflammatory markers remain elevated. He has no evidence of LVAD malfunction.     Please call me with questions at [phone].

## 2020-04-07 NOTE — PROGRESS NOTE ADULT - SUBJECTIVE AND OBJECTIVE BOX
Follow Up: LVAD, Covid-19 infection      Interval History/ROS:Patient is a 64y old  Male who presents with a chief complaint of Nausea/ (07 Apr 2020 13:51)  - no acute events reported overnight  - patient assessed at bedside. On room air. Reported loose stools    Allergies    No Known Allergies    Intolerances        ANTIMICROBIALS:      OTHER MEDS:  acetaminophen   Tablet .. 650 milliGRAM(s) Oral every 6 hours PRN  aspirin enteric coated 81 milliGRAM(s) Oral daily  chlorhexidine 4% Liquid 1 Application(s) Topical <User Schedule>  famotidine    Tablet 20 milliGRAM(s) Oral daily  finasteride 5 milliGRAM(s) Oral daily  lisinopril 10 milliGRAM(s) Oral every 12 hours  mirtazapine 7.5 milliGRAM(s) Oral at bedtime  polyethylene glycol 3350 17 Gram(s) Oral daily PRN  senna 2 Tablet(s) Oral at bedtime  sodium chloride 0.9% lock flush 3 milliLiter(s) IV Push every 8 hours  tamsulosin 0.4 milliGRAM(s) Oral at bedtime      Vital Signs Last 24 Hrs  T(C): 36.3 (07 Apr 2020 14:00), Max: 37.6 (06 Apr 2020 22:10)  T(F): 97.4 (07 Apr 2020 14:00), Max: 99.7 (06 Apr 2020 22:10)  HR: 131 (07 Apr 2020 14:00) (125 - 131)  BP: 101/65 (07 Apr 2020 05:23) (101/65 - 101/65)  BP(mean): 70 (07 Apr 2020 14:00) (68 - 98)  RR: 18 (07 Apr 2020 14:00) (18 - 18)  SpO2: 95% (07 Apr 2020 14:00) (94% - 95%)    EXAM:  Constitutional: Not in acute distress  Eyes: No icterus. Pupils b/l reactive to light.  Oral cavity: Clear, no lesions  Neck: No neck vein distension noted  RS: Chest clear to auscultation bilaterally. No wheeze/rhonchi/crepitations.  CVS: LVAD heart sounds heard. No murmurs/rubs/gallops.  Abdomen: Soft. No guarding/rigidity/tenderness. LVAD site under dressing  : No acute abnormalities  Extremities: Warm. No pedal edema  Skin: No lesions noted  Vascular: No evidence of phlebitis  Neuro: Alert, oriented to time/place/person                        13.4   5.18  )-----------( 204      ( 07 Apr 2020 08:38 )             41.5       04-07    133<L>  |  99  |  34<H>  ----------------------------<  105<H>  5.1   |  17<L>  |  1.04    Ca    9.1      07 Apr 2020 08:38    TPro  7.9  /  Alb  3.7  /  TBili  0.3  /  DBili  x   /  AST  33  /  ALT  13  /  AlkPhos  61  04-07          MICROBIOLOGY:Culture Results:   No growth (04-01 @ 00:56)  Culture Results:   No Growth Final (03-31 @ 21:23)  Culture Results:   No Growth Final (03-31 @ 21:23)

## 2020-04-07 NOTE — PROGRESS NOTE ADULT - PROBLEM SELECTOR PLAN 1
COVID Isolation/ Droplet precautions  ID following  Completed Plaquenil taper  monitor crp/ esr and procalcitonin levels

## 2020-04-07 NOTE — PROGRESS NOTE ADULT - ATTENDING COMMENTS
Patient seen and examined with Dr. Hernandez    I agree with his interval history exam and plans as noted above    COVID-19+ infection  GI symptoms predominate  Continue supportive care- currently stable on room air    Morris Prater MD  [phone]  After 5pm/weekends [phone]

## 2020-04-07 NOTE — PROGRESS NOTE ADULT - PROBLEM SELECTOR PLAN 2
Heart Failure following  Continue asa 81 daily  Increase Lisinopril to 10 mg BID per HF   500cc NS bolus given followed by additional 500cc NS at 75 cc/hr per HF  Continue AC on Coumadin daily - hold tonight for INR 4.88  Continue to monitor daily labs CBC/CMP/INR/LDH

## 2020-04-07 NOTE — CHART NOTE - NSCHARTNOTEFT_GEN_A_CORE
Nutrition Initial Assessment    Nutrition Consult Received: Yes [   ]  No [  x ]    Reason for Initial Nutrition Assessment: Length Of Stay     Source of Information: Unable to conduct a fact to face interview due to limited contact restrictions related to pt's medical condition and isolation precautions. Unable to reach patient via phone despite multiple attempts, attempted to reach family/emergency contact however no answer. Information obtained from chart review, past RD notes.     Admitting Diagnosis: 65 y/o male PMH ACC/AHA stage D HF due to NICM HM2 LVAD, peripheral artery disease with significant stenosis, SIADH, Depression, CKD-3, with hyperkalemia past E. coli UTIs. Admitted with generalized weakness, chills non-specific abdominal pain. Positive for Covid 19.     Limited subjective information obtained, per chart pt admitted with abdominal pain, nausea, constipation followed by loose BMs. NKFA noted in chart. Per chart pt takes coumadin at home, pt educated on drug-nutrient interaction of vitamin K and coumadin as well as heart failure nutrition therapy during previous RD visits. No chewing/swallowing difficulty noted PTA.     PAST MEDICAL & SURGICAL HISTORY:  Pleural effusion  Depression  CAD (coronary artery disease)  CHF (congestive heart failure)  S/P ventricular assist device  S/P TVR (tricuspid valve repair)    GI Issues: constipation, last BM 3/31 per chart, bowel regimen noted.    Current Nutrition Order:  DASH   PO Intake:   Good (%) [   ]    Fair (50-75%) [   ]    Poor (<50%) [  x ]: 0-50% per nursing flow sheets     Skin Integrity: free of pressure injuries per nursing flow sheets   Edema: none    Labs:   04-07 Na133 mmol/L<L> Glu 105 mg/dL<H> K+ 5.1 mmol/L Cr  1.04 mg/dL BUN 34 mg/dL<H> Phos n/a   Alb 3.7 g/dL PAB n/a         Medications:  MEDICATIONS  (STANDING):  aspirin enteric coated 81 milliGRAM(s) Oral daily  chlorhexidine 4% Liquid 1 Application(s) Topical <User Schedule>  famotidine    Tablet 20 milliGRAM(s) Oral daily  finasteride 5 milliGRAM(s) Oral daily  lisinopril 10 milliGRAM(s) Oral every 12 hours  mirtazapine 7.5 milliGRAM(s) Oral at bedtime  senna 2 Tablet(s) Oral at bedtime  sodium chloride 0.9% lock flush 3 milliLiter(s) IV Push every 8 hours  sorbitol 70% Solution 15 milliLiter(s) Oral once  tamsulosin 0.4 milliGRAM(s) Oral at bedtime    MEDICATIONS  (PRN):  acetaminophen   Tablet .. 650 milliGRAM(s) Oral every 6 hours PRN Temp greater or equal to 38C (100.4F), Moderate Pain (4 - 6)  polyethylene glycol 3350 17 Gram(s) Oral daily PRN Constipation    Weights per [facility]: 174lbs (3/7/20), 172lbs (11/14/19), 170lbs (8/8/19), 157lbs (4/26/18), 126lbs (11/30/17)---weight appearing to up trend.   Weight per previous RD notes:  126.3lbs (12/2017), 145.7 lb (8/30/17)    Admitted Anthropometrics:    Dosing weight: 52.2Kg (3/31)--? question accuracy, weight on 3/7/20 noted as 174lbs which is more consistent with recent weight trends noted above.   Height: 177.8cm   BMI 16.5kg/m2   BMI using weight of 174lbs (3/7/20) is 25kg/m2     Nutrition Focused Physical Exam: Unable to complete due to limited isolation contact precautions at this time.     Estimated Energy Needs ( 25kcal/kg- 30 kcal/kg): 1975-2528kcal/day  Estimated Protein Needs (1g/kg-1.2 g/kg): 79-95gm/day   Based on weight of: weight of 174lbs (3/7/20)    [x  ] Nutrition Diagnosis: Inadequate protein-energy intake related to weakness, abdominal pain, Nausea, diarrhea  as evidenced by pt with decreased PO intake PTA, poor PO intake in-house.   [  ] No active nutrition diagnosis at this time  [  ] Current medical condition precludes nutrition intervention    Goal:    Nutrition Interventions:   Recommendations:  1) Consider liberalizing diet to Low Sodium. Encourage PO intake of protein-rich, nutrient dense foods   2) Continue Ensure Enlive TID to supplement PO intake (provides 350 Kcal, 20gm protein per serving). Monitor acceptance to nutrition supplements.   3) RD to remain available and follow-up as medically appropriate.     RD to follow-up per protocol.  Keiko Larsen, RD, CDN, Pager # [phone]

## 2020-04-07 NOTE — PROGRESS NOTE ADULT - ASSESSMENT
Mr. Quispe is a 64 year old man with ACC/AHA stage D HF due to NICM, s/p HM2 LVAD  in 9/12/17 as destination therapy due to severe peripheral artery disease with significant stenosis. He currently presents with COVID-19 accompanied by abdominal pain, diarrhea and acute renal failure. He completed a 5 day course of Plaquenil on 4/5    He remains afebrile but continues to be tachycardic. Landrum reinserted for rentention and with minimal output (300cc in the past 12 hours). MAPs improved to 70-80s since reinitiation of Lisinopril (although with one reading 68). RASHAWN has resolved and remains stable. His INR remains supratherapeutic. He is without evidence of LVAD malfunction

## 2020-04-07 NOTE — PROGRESS NOTE ADULT - ASSESSMENT
This a 65 y/o male PMH ACC/AHA stage D HF due to NICM HM2 LVAD, TV annuloplasty ring 9/12/17 as destination therapy due to severe peripheral artery disease with significant stenosis, SIADH, Depression, CKD-3, with hyperkalemia past E. coli UTIs. Today he reports generalized weakness since 4 days, chills since 3 days, non-specific abdominal pain and constipation since 2 days with loose stools today.  Reports +Covid-19 sick contacts at home. Denies cough, difficulty breathing, chest pain, palpitations. Directed to [facility] ED r/o COVID. Seen by ID -BCx & urine Cx sent, COVID 19 PCR positive- started on Plaquenil, cefepime and flagyl. Renal function improved with volume resuscitation, home BP medications currently on hold.   4/2: VSS, Per ID d/c cefepime and flagyl today. Continue Plaquenil taper. INR 4.27 holding Coumadin tonight, Of note overnight patient had two febrile episodes, tylenol was given. MAPs went down to 30, IV fluid was given and MAPs improved.   4/3 temp overnight max 101.5- tylenol prn; wbc 3; CRP 5.6 today; continue to monitor closely on plaquenil;  MAP's improved today 70's- IV fluid 500 cc normal saline as per Dr. Iyer   continue to monitor O2 sats: 98% RA ; anticoagulation with coumadin INR 3.08- coumadin 2 mg this evening  4/4 VSS ; tmax 100.9 overnight;  MAP 80's; resume bph meds today and d/c aleman sun/ mon  anticoagulation with coumadin INR 2.0- coumadin 2.5 mg this evening;  today, CRP 10.1- plaquenil taper as per ID, continue to monitor closely   4/5 VSS, complete Plaquenil taper tonight, RA O2 Sat 96%, INR 2.76, Coumadin 2mg per HF. Aleman dc'd today. Possible d/c home Monday if stable.   4/6 VSS, pt voiding. MAPs>90, resumed back on lisinopril 10 daily, also 500ml NS bolus per HF. INR 4.12, Coumadin on hold tonight.   4/7: VSS, MAPs 68-88. Increased Lisinopril to 10 mg daily, gave 50cc NS bolus and started fluids at 75cc/hr for total of 500cc per HF. Coumadin on hold tonight for INR 4.88. Ensure started to supplement PO intake per nutrition recs.   Discharge planning: home when stable

## 2020-04-07 NOTE — PROGRESS NOTE ADULT - SUBJECTIVE AND OBJECTIVE BOX
Subjective:  - ongoing abdominal and lower back pain  -     Medications:  acetaminophen   Tablet .. 650 milliGRAM(s) Oral every 6 hours PRN  aspirin enteric coated 81 milliGRAM(s) Oral daily  chlorhexidine 4% Liquid 1 Application(s) Topical <User Schedule>  famotidine    Tablet 20 milliGRAM(s) Oral daily  finasteride 5 milliGRAM(s) Oral daily  lisinopril 10 milliGRAM(s) Oral every 12 hours  mirtazapine 7.5 milliGRAM(s) Oral at bedtime  polyethylene glycol 3350 17 Gram(s) Oral daily PRN  senna 2 Tablet(s) Oral at bedtime  sodium chloride 0.9% lock flush 3 milliLiter(s) IV Push every 8 hours  tamsulosin 0.4 milliGRAM(s) Oral at bedtime      Physical Exam:    Vitals:  Vital Signs Last 24 Hours  T(C): 36.5 (04-07-20 @ 10:00), Max: 37.6 (04-06-20 @ 22:10)  HR: 128 (04-07-20 @ 10:00) (125 - 130)  BP: 101/65 (04-07-20 @ 05:23) (101/65 - 101/65)  RR: 18 (04-07-20 @ 10:00) (18 - 18)  SpO2: 94% (04-07-20 @ 10:00) (94% - 96%)        I&O's Summary    06 Apr 2020 07:01  -  07 Apr 2020 07:00  --------------------------------------------------------  IN: 0 mL / OUT: 550 mL / NET: -550 mL    07 Apr 2020 07:01  -  07 Apr 2020 13:52  --------------------------------------------------------  IN: 0 mL / OUT: 40 mL / NET: -40 mL        Tele:    General: No distress. Comfortable.  HEENT: EOM intact.  Neck: Neck supple. JVP not elevated. No masses  Chest: Clear to auscultation bilaterally  CV: Normal S1 and S2. No murmurs, rub, or gallops. Radial pulses normal.  Abdomen: Soft, non-distended, non-tender  Skin: No rashes or skin breakdown  Neurology: Alert and oriented times three. Sensation intact  Psych: Affect normal    Labs:                        13.4   5.18  )-----------( 204      ( 07 Apr 2020 08:38 )             41.5     04-07    133<L>  |  99  |  34<H>  ----------------------------<  105<H>  5.1   |  17<L>  |  1.04    Ca    9.1      07 Apr 2020 08:38    TPro  7.9  /  Alb  3.7  /  TBili  0.3  /  DBili  x   /  AST  33  /  ALT  13  /  AlkPhos  61  04-07    PT/INR - ( 07 Apr 2020 08:38 )   PT: 58.4 sec;   INR: 4.88 ratio               Serum Pro-Brain Natriuretic Peptide: 397 pg/mL (04-04 @ 05:53)      Lactate Dehydrogenase, Serum: 447 U/L (04-07 @ 08:38)  Lactate Dehydrogenase, Serum: 457 U/L (04-06 @ 06:28) Subjective:  - ongoing abdominal and lower back pain  - Landrum catheter reinserted for retention, minimal urine output overnight  - many PI events with 2 dropped speeds to 8600    Medications:  acetaminophen   Tablet .. 650 milliGRAM(s) Oral every 6 hours PRN  aspirin enteric coated 81 milliGRAM(s) Oral daily  chlorhexidine 4% Liquid 1 Application(s) Topical <User Schedule>  famotidine    Tablet 20 milliGRAM(s) Oral daily  finasteride 5 milliGRAM(s) Oral daily  lisinopril 10 milliGRAM(s) Oral every 12 hours  mirtazapine 7.5 milliGRAM(s) Oral at bedtime  polyethylene glycol 3350 17 Gram(s) Oral daily PRN  senna 2 Tablet(s) Oral at bedtime  sodium chloride 0.9% lock flush 3 milliLiter(s) IV Push every 8 hours  tamsulosin 0.4 milliGRAM(s) Oral at bedtime    Physical Exam:    Vitals:  Vital Signs Last 24 Hours  T(C): 36.5 (04-07-20 @ 10:00), Max: 37.6 (04-06-20 @ 22:10)  HR: 128 (04-07-20 @ 10:00) (125 - 130)  BP: 101/65 (04-07-20 @ 05:23) (101/65 - 101/65)  RR: 18 (04-07-20 @ 10:00) (18 - 18)  SpO2: 94% (04-07-20 @ 10:00) (94% - 96%)    I&O's Summary    06 Apr 2020 07:01 - 07 Apr 2020 07:00  --------------------------------------------------------  IN: 0 mL / OUT: 550 mL / NET: -550 mL    07 Apr 2020 07:01  -  07 Apr 2020 13:52  --------------------------------------------------------  IN: 0 mL / OUT: 40 mL / NET: -40 mL    Tele: -130s    LVAD Interrogation: Heart Mate II   RPMs: 8800  Power: 4.9  Flow: 4.8  PI: 5.9  Event: many PI events, 2 dropped speeds to 8600  No programming changes were made    General: No distress. Frail appearing  HEENT: EOM intact.  Neck: Neck supple. JVP mildly elevated. No masses  Chest: Clear to auscultation bilaterally  CV: Typical LVAD sounds. No distal pulses. No edema  : Landrum with clear yellow urine, no sediments   Abdomen: Soft, non-distended, not tender to palpation  Driveline exit site: Clean, dry, and intact  Skin: No rashes or skin breakdown  Neurology: Alert and oriented. Sensation intact  Psych: Affect normal    Labs:                        13.4   5.18  )-----------( 204      ( 07 Apr 2020 08:38 )             41.5     04-07    133<L>  |  99  |  34<H>  ----------------------------<  105<H>  5.1   |  17<L>  |  1.04    Ca    9.1      07 Apr 2020 08:38    TPro  7.9  /  Alb  3.7  /  TBili  0.3  /  DBili  x   /  AST  33  /  ALT  13  /  AlkPhos  61  04-07    PT/INR - ( 07 Apr 2020 08:38 )   PT: 58.4 sec;   INR: 4.88 ratio      Serum Pro-Brain Natriuretic Peptide: 397 pg/mL (04-04 @ 05:53)    Lactate Dehydrogenase, Serum: 447 U/L (04-07 @ 08:38)  Lactate Dehydrogenase, Serum: 457 U/L (04-06 @ 06:28)

## 2020-04-08 DIAGNOSIS — I47.2 VENTRICULAR TACHYCARDIA: ICD-10-CM

## 2020-04-08 LAB
ALBUMIN SERPL ELPH-MCNC: 3.3 G/DL — SIGNIFICANT CHANGE UP (ref 3.3–5)
ALP SERPL-CCNC: 63 U/L — SIGNIFICANT CHANGE UP (ref 40–120)
ALT FLD-CCNC: 18 U/L — SIGNIFICANT CHANGE UP (ref 10–45)
ANION GAP SERPL CALC-SCNC: 17 MMOL/L — SIGNIFICANT CHANGE UP (ref 5–17)
APTT BLD: 50.4 SEC — HIGH (ref 27.5–36.3)
AST SERPL-CCNC: 31 U/L — SIGNIFICANT CHANGE UP (ref 10–40)
BASOPHILS # BLD AUTO: 0.02 K/UL — SIGNIFICANT CHANGE UP (ref 0–0.2)
BASOPHILS NFR BLD AUTO: 0.3 % — SIGNIFICANT CHANGE UP (ref 0–2)
BILIRUB SERPL-MCNC: 0.3 MG/DL — SIGNIFICANT CHANGE UP (ref 0.2–1.2)
BUN SERPL-MCNC: 48 MG/DL — HIGH (ref 7–23)
CALCIUM SERPL-MCNC: 8.9 MG/DL — SIGNIFICANT CHANGE UP (ref 8.4–10.5)
CHLORIDE SERPL-SCNC: 99 MMOL/L — SIGNIFICANT CHANGE UP (ref 96–108)
CO2 SERPL-SCNC: 17 MMOL/L — LOW (ref 22–31)
CREAT SERPL-MCNC: 1.39 MG/DL — HIGH (ref 0.5–1.3)
CRP SERPL-MCNC: 11.62 MG/DL — HIGH (ref 0–0.4)
EOSINOPHIL # BLD AUTO: 0.05 K/UL — SIGNIFICANT CHANGE UP (ref 0–0.5)
EOSINOPHIL NFR BLD AUTO: 0.8 % — SIGNIFICANT CHANGE UP (ref 0–6)
GLUCOSE SERPL-MCNC: 119 MG/DL — HIGH (ref 70–99)
HCT VFR BLD CALC: 39.4 % — SIGNIFICANT CHANGE UP (ref 39–50)
HGB BLD-MCNC: 12.2 G/DL — LOW (ref 13–17)
IMM GRANULOCYTES NFR BLD AUTO: 1.5 % — SIGNIFICANT CHANGE UP (ref 0–1.5)
INR BLD: 4.12 RATIO — HIGH (ref 0.88–1.16)
LDH SERPL L TO P-CCNC: 483 U/L — HIGH (ref 50–242)
LYMPHOCYTES # BLD AUTO: 1.02 K/UL — SIGNIFICANT CHANGE UP (ref 1–3.3)
LYMPHOCYTES # BLD AUTO: 15.6 % — SIGNIFICANT CHANGE UP (ref 13–44)
MAGNESIUM SERPL-MCNC: 2.4 MG/DL — SIGNIFICANT CHANGE UP (ref 1.6–2.6)
MCHC RBC-ENTMCNC: 28.3 PG — SIGNIFICANT CHANGE UP (ref 27–34)
MCHC RBC-ENTMCNC: 31 GM/DL — LOW (ref 32–36)
MCV RBC AUTO: 91.4 FL — SIGNIFICANT CHANGE UP (ref 80–100)
MONOCYTES # BLD AUTO: 0.62 K/UL — SIGNIFICANT CHANGE UP (ref 0–0.9)
MONOCYTES NFR BLD AUTO: 9.5 % — SIGNIFICANT CHANGE UP (ref 2–14)
NEUTROPHILS # BLD AUTO: 4.74 K/UL — SIGNIFICANT CHANGE UP (ref 1.8–7.4)
NEUTROPHILS NFR BLD AUTO: 72.3 % — SIGNIFICANT CHANGE UP (ref 43–77)
NRBC # BLD: 0 /100 WBCS — SIGNIFICANT CHANGE UP (ref 0–0)
PLATELET # BLD AUTO: 223 K/UL — SIGNIFICANT CHANGE UP (ref 150–400)
POTASSIUM SERPL-MCNC: 4.8 MMOL/L — SIGNIFICANT CHANGE UP (ref 3.5–5.3)
POTASSIUM SERPL-SCNC: 4.8 MMOL/L — SIGNIFICANT CHANGE UP (ref 3.5–5.3)
PROCALCITONIN SERPL-MCNC: 0.14 NG/ML — HIGH (ref 0.02–0.1)
PROT SERPL-MCNC: 7.6 G/DL — SIGNIFICANT CHANGE UP (ref 6–8.3)
PROTHROM AB SERPL-ACNC: 49.5 SEC — HIGH (ref 10–12.9)
RBC # BLD: 4.31 M/UL — SIGNIFICANT CHANGE UP (ref 4.2–5.8)
RBC # FLD: 13.5 % — SIGNIFICANT CHANGE UP (ref 10.3–14.5)
SODIUM SERPL-SCNC: 133 MMOL/L — LOW (ref 135–145)
WBC # BLD: 6.55 K/UL — SIGNIFICANT CHANGE UP (ref 3.8–10.5)
WBC # FLD AUTO: 6.55 K/UL — SIGNIFICANT CHANGE UP (ref 3.8–10.5)

## 2020-04-08 PROCEDURE — 99232 SBSQ HOSP IP/OBS MODERATE 35: CPT | Mod: GC

## 2020-04-08 PROCEDURE — 93321 DOPPLER ECHO F-UP/LMTD STD: CPT | Mod: 26

## 2020-04-08 PROCEDURE — 99232 SBSQ HOSP IP/OBS MODERATE 35: CPT

## 2020-04-08 PROCEDURE — 93308 TTE F-UP OR LMTD: CPT | Mod: 26

## 2020-04-08 PROCEDURE — 99233 SBSQ HOSP IP/OBS HIGH 50: CPT | Mod: 25

## 2020-04-08 PROCEDURE — 93750 INTERROGATION VAD IN PERSON: CPT

## 2020-04-08 RX ORDER — FUROSEMIDE 40 MG
40 TABLET ORAL ONCE
Refills: 0 | Status: COMPLETED | OUTPATIENT
Start: 2020-04-08 | End: 2020-04-08

## 2020-04-08 RX ADMIN — FINASTERIDE 5 MILLIGRAM(S): 5 TABLET, FILM COATED ORAL at 10:22

## 2020-04-08 RX ADMIN — SODIUM CHLORIDE 3 MILLILITER(S): 9 INJECTION INTRAMUSCULAR; INTRAVENOUS; SUBCUTANEOUS at 22:52

## 2020-04-08 RX ADMIN — Medication 40 MILLIGRAM(S): at 12:44

## 2020-04-08 RX ADMIN — SODIUM CHLORIDE 3 MILLILITER(S): 9 INJECTION INTRAMUSCULAR; INTRAVENOUS; SUBCUTANEOUS at 05:43

## 2020-04-08 RX ADMIN — SODIUM CHLORIDE 3 MILLILITER(S): 9 INJECTION INTRAMUSCULAR; INTRAVENOUS; SUBCUTANEOUS at 10:22

## 2020-04-08 RX ADMIN — FAMOTIDINE 20 MILLIGRAM(S): 10 INJECTION INTRAVENOUS at 10:22

## 2020-04-08 RX ADMIN — SENNA PLUS 2 TABLET(S): 8.6 TABLET ORAL at 22:52

## 2020-04-08 RX ADMIN — AMIODARONE HYDROCHLORIDE 400 MILLIGRAM(S): 400 TABLET ORAL at 22:52

## 2020-04-08 RX ADMIN — MIRTAZAPINE 7.5 MILLIGRAM(S): 45 TABLET, ORALLY DISINTEGRATING ORAL at 22:52

## 2020-04-08 RX ADMIN — CHLORHEXIDINE GLUCONATE 1 APPLICATION(S): 213 SOLUTION TOPICAL at 10:21

## 2020-04-08 RX ADMIN — Medication 81 MILLIGRAM(S): at 10:22

## 2020-04-08 RX ADMIN — TAMSULOSIN HYDROCHLORIDE 0.4 MILLIGRAM(S): 0.4 CAPSULE ORAL at 22:52

## 2020-04-08 RX ADMIN — AMIODARONE HYDROCHLORIDE 400 MILLIGRAM(S): 400 TABLET ORAL at 05:42

## 2020-04-08 RX ADMIN — AMIODARONE HYDROCHLORIDE 400 MILLIGRAM(S): 400 TABLET ORAL at 12:44

## 2020-04-08 NOTE — PROGRESS NOTE ADULT - ATTENDING COMMENTS
We will order an echocardiogram to better assess RV size and filling given decreased PIs and lower MAPs.     Please call me with questions at [phone].

## 2020-04-08 NOTE — PROGRESS NOTE ADULT - PROBLEM SELECTOR PLAN 3
- Discontinue Lisinopril 10mg PO BID  - Currently holding blood pressure medications for MAP 50-60s  - ECHO pending to monitor for RV failure

## 2020-04-08 NOTE — PROGRESS NOTE ADULT - ASSESSMENT
This a 63 y/o male PMH ACC/AHA stage D HF due to NICM HM2 LVAD, TV annuloplasty ring 9/12/17 as destination therapy due to severe peripheral artery disease with significant stenosis, SIADH, Depression, CKD-3, with hyperkalemia past E. coli UTIs. Today he reports generalized weakness since 4 days, chills since 3 days, non-specific abdominal pain and constipation since 2 days with loose stools today.  Reports +Covid-19 sick contacts at home. Denies cough, difficulty breathing, chest pain, palpitations. Directed to [facility] ED r/o COVID. Seen by ID -BCx & urine Cx sent, COVID 19 PCR positive- started on Plaquenil, cefepime and flagyl. Renal function improved with volume resuscitation, home BP medications currently on hold.   4/2: VSS, Per ID d/c cefepime and flagyl today. Continue Plaquenil taper. INR 4.27 holding Coumadin tonight, Of note overnight patient had two febrile episodes, tylenol was given. MAPs went down to 30, IV fluid was given and MAPs improved.   4/3 temp overnight max 101.5- tylenol prn; wbc 3; CRP 5.6 today; continue to monitor closely on plaquenil;  MAP's improved today 70's- IV fluid 500 cc normal saline as per Dr. Iyer   continue to monitor O2 sats: 98% RA ; anticoagulation with coumadin INR 3.08- coumadin 2 mg this evening  4/4 VSS ; tmax 100.9 overnight;  MAP 80's; resume bph meds today and d/c aleman sun/ mon  anticoagulation with coumadin INR 2.0- coumadin 2.5 mg this evening;  today, CRP 10.1- plaquenil taper as per ID, continue to monitor closely   4/5 VSS, complete Plaquenil taper tonight, RA O2 Sat 96%, INR 2.76, Coumadin 2mg per HF. Aleman dc'd today. Possible d/c home Monday if stable.   4/6 VSS, pt voiding. MAPs>90, resumed back on lisinopril 10 daily, also 500ml NS bolus per HF. INR 4.12, Coumadin on hold tonight.   4/7: VSS, MAPs 68-88. Increased Lisinopril to 10 mg daily, gave 50cc NS bolus and started fluids at 75cc/hr for total of 500cc per HF. Coumadin on hold tonight for INR 4.88. Ensure started to supplement PO intake per nutrition recs.   4/8  INR  4.12     held coumadin    ,,   dc lisinopril    for elevated creatinine   MAPS  60

## 2020-04-08 NOTE — PROGRESS NOTE ADULT - PROBLEM SELECTOR PLAN 2
Heart Failure following  Continue asa 81 daily  dc lisinopril    Continue AC on Coumadin daily - hold tonight for INR   Continue to monitor daily labs CBC/CMP/INR/LDH

## 2020-04-08 NOTE — PROGRESS NOTE ADULT - ASSESSMENT
64 year old man with ACC/AHA stage D HF due to NICM, s/p HM2 LVAD  in 9/12/17 as destination therapy due to severe peripheral artery disease with significant stenosis. He currently presents with COVID-19 accompanied by abdominal pain, diarrhea and acute renal failure. He completed a 5 day course of Plaquenil on 4/5    He remains afebrile but continues to be tachycardic. Landrum reinserted 4/6 for  urinary rentention and with minimal output. He was originally started IV fluids on 4/7, however was discontinue as pt did not response appropriately. He was then given Lasix 40 mg IV yesterday followed by additional today. Patient went into VT on 4/7, self converted and was Amio loaded. He currently remains in SR, however was noted to have 14 beats of VT this morning, asymptomatic. Was initially resumed on Lisinopril, however due to MAPs in 50s-60s will discontinue today. RASHAWN has resolved and remains stable. His INR remains supratherapeutic. He is without evidence of LVAD malfunction

## 2020-04-08 NOTE — PROGRESS NOTE ADULT - SUBJECTIVE AND OBJECTIVE BOX
LVAD PROGRESS NOTE     Subjective:  Patient seen and examined resting in bed. ON no issues. Today patient states to have ongoing abdominal and lower back pain. He denies any n/v/c/d.     Telemetry: -120  O2 sat: 94-95    Vital Signs Last 24 Hrs  T(C): 36.4 (04-08-20 @ 14:10), Max: 37.3 (04-07-20 @ 21:35)  T(F): 97.5 (04-08-20 @ 14:10), Max: 99.1 (04-07-20 @ 21:35)  HR: 108 (04-08-20 @ 14:10) (108 - 169)  BP: --  RR: 18 (04-08-20 @ 14:10) (18 - 18)  SpO2: 94% (04-08-20 @ 14:10) (94% - 96%)             LVAD Parameters:  Flow: 3.9  Speed: 8800  PI: 4  Power: 4.5     Alarms: multiple PI events within 24 hours, remains asymptomatic       04-07 @ 07:01  -  04-08 @ 07:00  --------------------------------------------------------  IN: 1030 mL / OUT: 915 mL / NET: 115 mL    04-08 @ 07:01  -  04-08 @ 15:42  --------------------------------------------------------  IN: 0 mL / OUT: 75 mL / NET: -75 mL      Coumadin    [X ] YES          [  ]      NO         Reason:  LVAD, currently holding for elevated INR         Medications:  acetaminophen   Tablet .. 650 milliGRAM(s) Oral every 6 hours PRN  aMIOdarone    Tablet   Oral   aMIOdarone    Tablet 400 milliGRAM(s) Oral every 8 hours  aspirin enteric coated 81 milliGRAM(s) Oral daily  chlorhexidine 4% Liquid 1 Application(s) Topical <User Schedule>  famotidine    Tablet 20 milliGRAM(s) Oral daily  finasteride 5 milliGRAM(s) Oral daily  mirtazapine 7.5 milliGRAM(s) Oral at bedtime  polyethylene glycol 3350 17 Gram(s) Oral daily PRN  senna 2 Tablet(s) Oral at bedtime  sodium chloride 0.9% lock flush 3 milliLiter(s) IV Push every 8 hours  tamsulosin 0.4 milliGRAM(s) Oral at bedtime        Labs within 24 hours:                        12.2   6.55  )-----------( 223      ( 08 Apr 2020 12:10 )             39.4       04-08    133<L>  |  99  |  48<H>  ----------------------------<  119<H>  4.8   |  17<L>  |  1.39<H>    Ca    8.9      08 Apr 2020 12:10  Mg     2.4     04-08    TPro  7.6  /  Alb  3.3  /  TBili  0.3  /  DBili  x   /  AST  31  /  ALT  18  /  AlkPhos  63  04-08      PT/INR - ( 08 Apr 2020 12:10 )   PT: 49.5 sec;   INR: 4.12 ratio         PTT - ( 08 Apr 2020 12:10 )  PTT:50.4 sec      PHYSICAL EXAM:    Neurology: alert and oriented x 3, nonfocal, no gross deficits    Neck: Neck supple. JVP mildly elevated. No masses    CV : +VAD Hum,     Pulm: CTA b/l     GI: Abdomen soft, non-tender, nondistended, +BS, +bowel movement      Extremities: warm, +distal pulses, no calf tenderness, trace edema     Incision: driveline dressing c/d/i LVAD PROGRESS NOTE     Subjective:  Patient seen and examined resting in bed. ON no issues. Today patient states to have ongoing abdominal and lower back pain. He denies any n/v/c/d.     Telemetry: -120  O2 sat: 94-95    Vital Signs Last 24 Hrs  T(C): 36.4 (04-08-20 @ 14:10), Max: 37.3 (04-07-20 @ 21:35)  T(F): 97.5 (04-08-20 @ 14:10), Max: 99.1 (04-07-20 @ 21:35)  HR: 108 (04-08-20 @ 14:10) (108 - 169)  BP: --  RR: 18 (04-08-20 @ 14:10) (18 - 18)  SpO2: 94% (04-08-20 @ 14:10) (94% - 96%)             LVAD Interrogation  HM3  Flow: 3.9  Speed: 8800  PI: 4  Power: 4.5   Alarms: multiple PI events within 24 hours, remains asymptomatic   No programming changes were made.       04-07 @ 07:01  -  04-08 @ 07:00  --------------------------------------------------------  IN: 1030 mL / OUT: 915 mL / NET: 115 mL    04-08 @ 07:01  -  04-08 @ 15:42  --------------------------------------------------------  IN: 0 mL / OUT: 75 mL / NET: -75 mL      Coumadin    [X ] YES          [  ]      NO         Reason:  LVAD, currently holding for elevated INR         Medications:  acetaminophen   Tablet .. 650 milliGRAM(s) Oral every 6 hours PRN  aMIOdarone    Tablet   Oral   aMIOdarone    Tablet 400 milliGRAM(s) Oral every 8 hours  aspirin enteric coated 81 milliGRAM(s) Oral daily  chlorhexidine 4% Liquid 1 Application(s) Topical <User Schedule>  famotidine    Tablet 20 milliGRAM(s) Oral daily  finasteride 5 milliGRAM(s) Oral daily  mirtazapine 7.5 milliGRAM(s) Oral at bedtime  polyethylene glycol 3350 17 Gram(s) Oral daily PRN  senna 2 Tablet(s) Oral at bedtime  sodium chloride 0.9% lock flush 3 milliLiter(s) IV Push every 8 hours  tamsulosin 0.4 milliGRAM(s) Oral at bedtime        Labs within 24 hours:                        12.2   6.55  )-----------( 223      ( 08 Apr 2020 12:10 )             39.4       04-08    133<L>  |  99  |  48<H>  ----------------------------<  119<H>  4.8   |  17<L>  |  1.39<H>    Ca    8.9      08 Apr 2020 12:10  Mg     2.4     04-08    TPro  7.6  /  Alb  3.3  /  TBili  0.3  /  DBili  x   /  AST  31  /  ALT  18  /  AlkPhos  63  04-08      PT/INR - ( 08 Apr 2020 12:10 )   PT: 49.5 sec;   INR: 4.12 ratio         PTT - ( 08 Apr 2020 12:10 )  PTT:50.4 sec      PHYSICAL EXAM:    Neurology: alert and oriented x 3, nonfocal, no gross deficits    Neck: Neck supple. JVP mildly elevated. No masses    CV : +VAD Hum,     Pulm: CTA b/l     GI: Abdomen soft, non-tender, nondistended, +BS, +bowel movement      Extremities: warm, +distal pulses, no calf tenderness, trace edema     Incision: driveline dressing c/d/i

## 2020-04-08 NOTE — PROGRESS NOTE ADULT - SUBJECTIVE AND OBJECTIVE BOX
VITAL SIGNS    Telemetry:     Sr  -ST  120    Vital Signs Last 24 Hrs  T(C): 36.4 (04-08-20 @ 14:10), Max: 37.3 (04-07-20 @ 21:35)  T(F): 97.5 (04-08-20 @ 14:10), Max: 99.1 (04-07-20 @ 21:35)  HR: 108 (04-08-20 @ 14:10) (108 - 123)  BP: --  RR: 18 (04-08-20 @ 14:10) (18 - 18)  SpO2: 94% (04-08-20 @ 14:10) (94% - 96%)                   Daily     Daily       Bilirubin Total, Serum: 0.3 mg/dL (04-08 @ 12:10)  Bilirubin Total, Serum: 0.4 mg/dL (04-07 @ 19:48)    CAPILLARY BLOOD GLUCOSE        Coumadin held

## 2020-04-08 NOTE — PROGRESS NOTE ADULT - SUBJECTIVE AND OBJECTIVE BOX
INFECTIOUS DISEASES FOLLOW UP-- Anitra Prater  [phone]    This is a follow up note for this  64yMale with  Shortness of breath  COVID+      ROS:  CONSTITUTIONAL:  low grade fever, good appetite  CARDIOVASCULAR:  No chest pain or palpitations  RESPIRATORY:  No dyspnea  GASTROINTESTINAL:  No nausea, vomiting, diarrhea, or abdominal pain  GENITOURINARY:  No dysuria  NEUROLOGIC:  No headache,     Allergies    No Known Allergies    Intolerances        ANTIBIOTICS/RELEVANT:  antimicrobials    immunologic:    OTHER:  acetaminophen   Tablet .. 650 milliGRAM(s) Oral every 6 hours PRN  aMIOdarone    Tablet   Oral   aMIOdarone    Tablet 400 milliGRAM(s) Oral every 8 hours  aspirin enteric coated 81 milliGRAM(s) Oral daily  chlorhexidine 4% Liquid 1 Application(s) Topical <User Schedule>  famotidine    Tablet 20 milliGRAM(s) Oral daily  finasteride 5 milliGRAM(s) Oral daily  mirtazapine 7.5 milliGRAM(s) Oral at bedtime  polyethylene glycol 3350 17 Gram(s) Oral daily PRN  senna 2 Tablet(s) Oral at bedtime  sodium chloride 0.9% lock flush 3 milliLiter(s) IV Push every 8 hours  tamsulosin 0.4 milliGRAM(s) Oral at bedtime      Objective:  Vital Signs Last 24 Hrs  T(C): 36.4 (08 Apr 2020 14:10), Max: 37.3 (07 Apr 2020 21:35)  T(F): 97.5 (08 Apr 2020 14:10), Max: 99.1 (07 Apr 2020 21:35)  HR: 108 (08 Apr 2020 14:10) (108 - 123)  BP: --  BP(mean): 58 (08 Apr 2020 14:10) (58 - 80)  RR: 18 (08 Apr 2020 14:10) (18 - 18)  SpO2: 94% (08 Apr 2020 14:10) (94% - 96%)    PHYSICAL EXAM:  Constitutional:no acute distress RA oxygenation sat okay  Eyes:ALONSO, EOMI  Ear/Nose/Throat: no oral lesions, 	  Respiratory: clear BL  Cardiovascular: LVAD sounds  Gastrointestinal:soft, (+) BS, no tenderness- mild tenderness  Extremities:no e/e/c  No Lymphadenopathy  IV sites not inflammed.    LABS:                        12.2   6.55  )-----------( 223      ( 08 Apr 2020 12:10 )             39.4     04-08    133<L>  |  99  |  48<H>  ----------------------------<  119<H>  4.8   |  17<L>  |  1.39<H>    Ca    8.9      08 Apr 2020 12:10  Mg     2.4     04-08    TPro  7.6  /  Alb  3.3  /  TBili  0.3  /  DBili  x   /  AST  31  /  ALT  18  /  AlkPhos  63  04-08    PT/INR - ( 08 Apr 2020 12:10 )   PT: 49.5 sec;   INR: 4.12 ratio         PTT - ( 08 Apr 2020 12:10 )  PTT:50.4 sec      MICROBIOLOGY:      COVID-19 PCR: Detected: This test has been validated by [employer] to be accurate;  though it has not been FDA cleared/approved by the usual pathway.  As with all laboratory tests, results should be correlated with clinical  findings. (03.31.20 @ 23:06)          RECENT CULTURES:      RADIOLOGY & ADDITIONAL STUDIES:    < from: Xray Chest 1 View AP/PA (03.31.20 @ 17:27) >  IMPRESSION:   Subtle linear opacities at the right mid lung field correlate to right upper lobe groundglass opacities better characterized on the same day chest CT.    < end of copied text >

## 2020-04-08 NOTE — PROGRESS NOTE ADULT - ASSESSMENT
ASSESSMENT:  64/M with PMH CHF s/p LVAD Sept 2017 (on Warfarin), CAD, s/p TVR, SIADH, Depression, CKD-3, past E. coli UTIs  P/w generalized weakness since 4 days; chills since 3 days; non-specific abdominal pain and constipation since 2 days and loose stools today  H/o sick contacts with Covid-19 at home. Imaging concerning for new opacities in RUL, Covid-19 PCR pending.  =========  LVAD with Covid-19 infection  - pt denied cough, upper resp symptoms, fevers. Appears comfortable on RA, able to speak full sentences without dyspnea  - However, he has sick contacts with Covid-19 at home and imaging findings of RUL opacities  - Covid-19 Labs - ; Procalcitonin 0.45 elevated ferritin and CRP  - completed 5 day course of PO Hydroxychloroquine    RECOMMENDATIONS:  - Continue supplemental O2 and supportive care  - completed a course of plaquenil and oxygenation is stable on RA  - consider repeat CXR  - Continue Contact and Airborne Isolation precautions    Morris Prater MD  [phone]  After 5pm/weekends [phone]

## 2020-04-08 NOTE — PROGRESS NOTE ADULT - PROBLEM SELECTOR PLAN 5
- Noted to go into VT (lasting >1 min on 4/7)  - Continue Amio load 400 mg q8hrs x 12 doses followed by Amio 200 mg daily (was initially started on 4/7)  -14 beats of VT today, continue to monitor pt on tele

## 2020-04-09 ENCOUNTER — APPOINTMENT (OUTPATIENT)
Dept: HEART FAILURE | Facility: CLINIC | Age: 65
End: 2020-04-09

## 2020-04-09 ENCOUNTER — APPOINTMENT (OUTPATIENT)
Dept: CV DIAGNOSITCS | Facility: HOSPITAL | Age: 65
End: 2020-04-09

## 2020-04-09 LAB
ALBUMIN SERPL ELPH-MCNC: 3.4 G/DL — SIGNIFICANT CHANGE UP (ref 3.3–5)
ALP SERPL-CCNC: 63 U/L — SIGNIFICANT CHANGE UP (ref 40–120)
ALT FLD-CCNC: 18 U/L — SIGNIFICANT CHANGE UP (ref 10–45)
ANION GAP SERPL CALC-SCNC: 14 MMOL/L — SIGNIFICANT CHANGE UP (ref 5–17)
AST SERPL-CCNC: 30 U/L — SIGNIFICANT CHANGE UP (ref 10–40)
BILIRUB SERPL-MCNC: 0.3 MG/DL — SIGNIFICANT CHANGE UP (ref 0.2–1.2)
BUN SERPL-MCNC: 51 MG/DL — HIGH (ref 7–23)
CALCIUM SERPL-MCNC: 8.8 MG/DL — SIGNIFICANT CHANGE UP (ref 8.4–10.5)
CHLORIDE SERPL-SCNC: 99 MMOL/L — SIGNIFICANT CHANGE UP (ref 96–108)
CO2 SERPL-SCNC: 20 MMOL/L — LOW (ref 22–31)
CREAT SERPL-MCNC: 1.34 MG/DL — HIGH (ref 0.5–1.3)
CRP SERPL-MCNC: 8.22 MG/DL — HIGH (ref 0–0.4)
G6PD RBC-CCNC: 16.9 U/G HGB — SIGNIFICANT CHANGE UP (ref 7–20.5)
GLUCOSE SERPL-MCNC: 124 MG/DL — HIGH (ref 70–99)
HCT VFR BLD CALC: 38.5 % — LOW (ref 39–50)
HGB BLD-MCNC: 12.6 G/DL — LOW (ref 13–17)
INR BLD: 3.06 RATIO — HIGH (ref 0.88–1.16)
LDH SERPL L TO P-CCNC: 375 U/L — HIGH (ref 50–242)
MCHC RBC-ENTMCNC: 28.8 PG — SIGNIFICANT CHANGE UP (ref 27–34)
MCHC RBC-ENTMCNC: 32.7 GM/DL — SIGNIFICANT CHANGE UP (ref 32–36)
MCV RBC AUTO: 88.1 FL — SIGNIFICANT CHANGE UP (ref 80–100)
NRBC # BLD: 0 /100 WBCS — SIGNIFICANT CHANGE UP (ref 0–0)
PLATELET # BLD AUTO: 261 K/UL — SIGNIFICANT CHANGE UP (ref 150–400)
POTASSIUM SERPL-MCNC: 4.6 MMOL/L — SIGNIFICANT CHANGE UP (ref 3.5–5.3)
POTASSIUM SERPL-SCNC: 4.6 MMOL/L — SIGNIFICANT CHANGE UP (ref 3.5–5.3)
PROT SERPL-MCNC: 7.7 G/DL — SIGNIFICANT CHANGE UP (ref 6–8.3)
PROTHROM AB SERPL-ACNC: 36.1 SEC — HIGH (ref 10–13.1)
RBC # BLD: 4.37 M/UL — SIGNIFICANT CHANGE UP (ref 4.2–5.8)
RBC # FLD: 13.5 % — SIGNIFICANT CHANGE UP (ref 10.3–14.5)
SODIUM SERPL-SCNC: 133 MMOL/L — LOW (ref 135–145)
WBC # BLD: 4.96 K/UL — SIGNIFICANT CHANGE UP (ref 3.8–10.5)
WBC # FLD AUTO: 4.96 K/UL — SIGNIFICANT CHANGE UP (ref 3.8–10.5)

## 2020-04-09 PROCEDURE — 99231 SBSQ HOSP IP/OBS SF/LOW 25: CPT

## 2020-04-09 PROCEDURE — 93750 INTERROGATION VAD IN PERSON: CPT

## 2020-04-09 PROCEDURE — 71045 X-RAY EXAM CHEST 1 VIEW: CPT | Mod: 26

## 2020-04-09 PROCEDURE — 99232 SBSQ HOSP IP/OBS MODERATE 35: CPT

## 2020-04-09 PROCEDURE — 99233 SBSQ HOSP IP/OBS HIGH 50: CPT | Mod: 25

## 2020-04-09 RX ADMIN — FINASTERIDE 5 MILLIGRAM(S): 5 TABLET, FILM COATED ORAL at 14:29

## 2020-04-09 RX ADMIN — AMIODARONE HYDROCHLORIDE 400 MILLIGRAM(S): 400 TABLET ORAL at 06:39

## 2020-04-09 RX ADMIN — Medication 81 MILLIGRAM(S): at 14:29

## 2020-04-09 RX ADMIN — SENNA PLUS 2 TABLET(S): 8.6 TABLET ORAL at 21:32

## 2020-04-09 RX ADMIN — CHLORHEXIDINE GLUCONATE 1 APPLICATION(S): 213 SOLUTION TOPICAL at 10:00

## 2020-04-09 RX ADMIN — SODIUM CHLORIDE 3 MILLILITER(S): 9 INJECTION INTRAMUSCULAR; INTRAVENOUS; SUBCUTANEOUS at 14:29

## 2020-04-09 RX ADMIN — FAMOTIDINE 20 MILLIGRAM(S): 10 INJECTION INTRAVENOUS at 14:29

## 2020-04-09 RX ADMIN — AMIODARONE HYDROCHLORIDE 400 MILLIGRAM(S): 400 TABLET ORAL at 21:03

## 2020-04-09 RX ADMIN — TAMSULOSIN HYDROCHLORIDE 0.4 MILLIGRAM(S): 0.4 CAPSULE ORAL at 21:32

## 2020-04-09 RX ADMIN — AMIODARONE HYDROCHLORIDE 400 MILLIGRAM(S): 400 TABLET ORAL at 14:29

## 2020-04-09 RX ADMIN — SODIUM CHLORIDE 3 MILLILITER(S): 9 INJECTION INTRAMUSCULAR; INTRAVENOUS; SUBCUTANEOUS at 21:32

## 2020-04-09 RX ADMIN — SODIUM CHLORIDE 3 MILLILITER(S): 9 INJECTION INTRAMUSCULAR; INTRAVENOUS; SUBCUTANEOUS at 05:30

## 2020-04-09 RX ADMIN — MIRTAZAPINE 7.5 MILLIGRAM(S): 45 TABLET, ORALLY DISINTEGRATING ORAL at 21:32

## 2020-04-09 NOTE — PHYSICAL THERAPY INITIAL EVALUATION ADULT - PERTINENT HX OF CURRENT PROBLEM, REHAB EVAL
64 y.o. M [facility] ACC/AHA stage D HF due to NICM HM2 LVAD , TV annuloplasty ring 9/12/17 as destination therapy due to severe PAD w/ significant stenosis  SIADH, Depression, CKD-3 w/ hyperkalemia, past E. coli UTIs.

## 2020-04-09 NOTE — PROGRESS NOTE ADULT - SUBJECTIVE AND OBJECTIVE BOX
VITAL SIGNS    Telemetry:     SR  100    Vital Signs Last 24 Hrs  T(C): 36.7 (04-09-20 @ 10:00), Max: 36.9 (04-08-20 @ 21:24)  T(F): 98 (04-09-20 @ 10:00), Max: 98.4 (04-08-20 @ 21:24)  HR: 112 (04-09-20 @ 10:00) (100 - 112)  BP: --  RR: 18 (04-09-20 @ 10:00) (18 - 18)  SpO2: 96% (04-09-20 @ 10:00) (96% - 98%)                   Daily     Daily       Bilirubin Total, Serum: 0.3 mg/dL (04-09 @ 05:56)    CAPILLARY BLOOD GLUCOSE        Coumadin    YES          held

## 2020-04-09 NOTE — PROGRESS NOTE ADULT - ASSESSMENT
ASSESSMENT:  64/M with PMH CHF s/p LVAD Sept 2017 (on Warfarin), CAD, s/p TVR, SIADH, Depression, CKD-3, past E. coli UTIs  P/w generalized weakness since 4 days; chills since 3 days; non-specific abdominal pain and constipation since 2 days and loose stools today  H/o sick contacts with Covid-19 at home. Imaging concerning for new opacities in RUL, Covid-19 positive  =========  LVAD with Covid-19 infection  - Covid-19 Labs - ; Procalcitonin 0.45 elevated ferritin and CRP  - completed 5 day course of PO Hydroxychloroquine  - repeat CXR showed new Rt lung opacity. Pt afebrile, on RA, with no acute symptoms of cough. Would monitor off antibiotics for now    RECOMMENDATIONS:  - Continue supplemental O2 and supportive care per primary team  - completed course of plaquenil and oxygenation is stable on RA  - Continue Contact and Airborne Isolation precautions    ABELARDO Forbes, MD  Fellow, Infectious Diseases  Pager: [phone]  After 5pm and on Weekends: Call [phone]

## 2020-04-09 NOTE — PROGRESS NOTE ADULT - REASON FOR ADMISSION
INFECTIOUS DISEASE PROGRESS NOTE    Patient: [place] Date: 3/13/2018   : 1969 Attending: Armida Mora MD   48 year old male        Chief Complain:   Patient is a 48 year old male with acute alcohol related liver failure    Subjective:    Mental status improved ,  Transferred to floor  MELD -Na 40  Denies abdominal pain, nausea, emesis., fever, chills  Off pressors  No further GIB  HRS-On CVVH-started 3-6-18  Pressor support with Levophed and Vasopressin  Leucocytosis with downward trend, reactive likely due to GIB versus infection-willde escalate antibiotics     Resides in [place], does not recall any infectious issues in the past.   No recent travel.   No consumption of any risky foods  No prosthetics  No skin or dental infections  No pets at home  Worked as a .   No recent immunizations (Live vaccines)  , + Pneumovax 18  Immunization History   Administered Date(s) Administered   • Influenza, Unspecified Formulation 10/04/2017   • Pneumococcal polysaccharide, adult, 23 valent 2018         Current Facility-Administered Medications   Medication Dose Route Frequency Provider Last Rate Last Dose   • folic acid (FOLATE) tablet 1 mg  1 mg Oral Daily Martine Huynh PA-C   1 mg at 18 0906   • thiamine (VITAMIN B1) tablet 100 mg  100 mg Oral Daily Martine Huynh PA-C   100 mg at 18 0906   • pantoprazole (PROTONIX) EC tablet 40 mg  40 mg Oral Nightly Armida Mora MD   40 mg at 18   • ceFAZolin (ANCEF) syringe   Intravenous PRN Eloy Toure MD   2,000 mg at 18 1526   • midodrine (PROAMATINE) tablet 5 mg  5 mg Oral TID AC Martien Huynh PA-C   5 mg at 18 0701   • octreotide (SandoSTATIN) injection 100 mcg  100 mcg Subcutaneous TID Martine Huynh PA-C   100 mcg at 18 0911   • ramelteon (ROZEREM) tablet 8 mg  8 mg Oral Nightly PRN Martine Huynh PA-C   8 mg at 18   • sodium  chloride (NORMAL SALINE) 0.9 % bolus 500 mL  500 mL CRRT Continuous PRN Jayden Kohli MD   Stopped at 03/12/18 1625   • sodium bicarbonate 8.4 % injection 50 mEq  50 mEq Intravenous Once Jalen Lujan MD       • lactulose (CHRONULAC) 10 GM/15ML syrup 20 g  20 g Oral BID Sharonda Wilkins NP   20 g at 03/13/18 0906   • zinc sulfate (ZINCATE) capsule 220 mg  220 mg Oral Daily with breakfast Aleksandra Patel NP   220 mg at 03/13/18 0900   • vitamin - therapeutic multivitamins w/minerals (CENTRUM SILVER,THERA-M) 1 tablet  1 tablet Oral Daily Linda Stock PA-C   1 tablet at 03/13/18 0906   • hydroCORTisone (CORTIZONE) 1 % cream   Topical BID Aiden Stack MD       • sodium chloride 0.9% infusion   Intravenous Continuous PRN Thai Wheat MD   Stopped at 03/12/18 1625   • albuterol (VENTOLIN) nebulizer 2.5 mg  2.5 mg Nebulization Q4H Resp PRN Thai Wheat MD       • rifAXIMin (XIFAXAN) tablet 550 mg  550 mg Oral 2 times per day Marielos Joshua MD   550 mg at 03/13/18 0906   • ursodiol (ISAEL) tablet 500 mg  500 mg Oral TID WC Marielos Joshua MD   500 mg at 03/13/18 0900   • ondansetron (ZOFRAN) injection 4 mg  4 mg Intravenous BID PRN Marielos Joshua MD       • albuterol-ipratropium 2.5 mg/0.5 mg (DUONEB) nebulizer solution 3 mL  3 mL Nebulization 4x Daily PRN Marielos Joshua MD   3 mL at 03/06/18 0452   • calcium carbonate (TUMS) chewable tablet 500 mg  500 mg Oral Q4H PRN Marielos Joshua MD       • sodium chloride (PF) 0.9 % injection 2 mL  2 mL Injection 2 times per day Marielos Joshua MD   2 mL at 03/13/18 0911   • sodium chloride (PF) 0.9 % injection 2 mL  2 mL Injection PRN Marielos Joshua MD           Antibiotics:  Empiric Cefepime, linezolid, Fluconazole Day 4    Culture Results:      Specimen Description (no units)   Date Value   03/06/2018 URINE, CATHETERIZED, DONOVAN   03/06/2018 FLUID ASCITES FLUID   03/06/2018 BLOOD, PERIPHERAL HAND, LEFT   03/06/2018 SWAB RECTUM      CULTURE (no units)   Date Value   03/06/2018 NO  GROWTH.   03/06/2018 NO GROWTH 5 DAYS.   03/06/2018 NO GROWTH 5 DAYS.   03/06/2018 (P)    ENTEROCOCCUS FAECIUM, VANCOMYCIN RESISTANT ISOLATED   03/06/2018     CALLED TO, READ BACK AND CONFIRMED MORGAN DAVE (RN) ON 03/07/2018 AT 5674. G, 40555.       Patient Active Problem List    Diagnosis Date Noted   • Chronic liver failure (CMS/HCC) => MARS (3/7/18 - present) 03/07/2018     Priority: High     Class: Acute     3/7/2018 MELD 40 (CRRT, creatinine 1.82, bilirubin 43.2, INR 3, sodium 132)     • Acute kidney injury (nontraumatic) (CMS/HCC) =>  MARS) 3/7/18 -- present), CRT (3/6/18 -- 3/7/18) 03/07/2018     Priority: Medium     Class: Acute   • Hepatic encephalopathy (CMS/HCC) 03/07/2018     Priority: Medium     Class: Acute   • Hematemesis 08/25/2017     Priority: Low   • Blood in stool 08/25/2017     Priority: Low   • Alcohol abuse 08/25/2017     Priority: Low   • Dehydration 08/25/2017     Priority: Low       Review of Systems:  Patient denies nausea and vomiting, tolerating oral intake  No fevers overnight  No chest pain or shortness of breath  Pertinent items are noted in history of present illness       Reviewed Pertinent: Allergies, Medications, Medical History, Radiology and Labs    Vital Last Value   Temperature 98.9 °F (37.2 °C) (03/13/18 0904)   Pulse 80 (03/13/18 0900)   Respiratory 10 (03/13/18 0900)   Non-Invasive  Blood Pressure 109/56 (03/13/18 0900)   Pulse Oximetry 98 % (03/13/18 0900)   Arterial   Blood Pressure 103/59 (03/10/18 0945)     Weight  (Last 4 values)     Weight    03/10/18 0548 03/11/18 0700 03/12/18 0600 03/13/18 0600   Weight: 108.9 kg 106.3 kg 105.3 kg 110.9 kg           Intake/Output:  Last Stool Occurrence: 1 (03/13/18 0900)    I/O this shift:  In: 240 [P.O.:240]  Out: -     I/O last 3 completed shifts:  In: 3914 [P.O.:1684; I.V.:411; NG/GT:1569; IV Piggyback:250]  Out: 1287 [Other:1287]        Physical Exam:  General: Alert , talkative  HEENT: Head atraumatic without any lesions, neck  Nausea/ supple and soft. Nose : normal nares. Throat without exudates, ulcers,erythema  Lungs: Clear to auscultation, no crackles or wheezing.  Chest wall normal  CV: S1 S2 RRR no murmurs, S3, S4 rubs or gallop.  Abdomen soft and non tender, No hepatomegaly or Splenomegaly. BS active in all 4 quadrants  No edema  Skin exam: normal  Joint exam : normal  CNS: Cranial nerves II through X!! Intact. No motor , sensory deficits        Laboratory Results:      Recent Labs  Lab 03/13/18  0630 03/12/18  1050 03/12/18  0340 03/11/18  1620 03/11/18  0410 03/10/18  0330 03/09/18  1445  03/09/18  0259  03/08/18  0920 03/08/18  0310 03/07/18  2059 03/07/18  1515 03/07/18  0930 03/07/18  0258  03/06/18  2100   GLUCOSE 109*  --  99 153* 144* 139* 231*  --  123*  < > 140* 145* 141* 132* 157* 150*  < >  --    SODIUM 139  --  141 141 140 138 137  --  137  < > 137 134* 134* 131* 133* 132*  < >  --    POTASSIUM 4.2 3.8 3.2* 3.2* 3.7 3.7 3.8  < > 3.6  < > 4.1 3.7 3.8 3.4 3.8 4.3  < >  --    CHLORIDE 104  --  105 106 105 103 104  --  104  < > 104 100 98 96* 95* 97*  < >  --    CO2 27  --  28 25 27 26 23  --  24  < > 26 24 25 24 24 22  < >  --    BUN 42*  --  24* 36* 33* 28* 27*  --  31*  < > 37* 30* 31* 34* 43* 54*  < >  --    CREATININE 0.90  --  0.52* 0.74 0.57* 0.69 0.52*  --  0.68  < > 0.92 0.76 0.87 0.98 1.47* 1.82*  < >  --    GFRA >90  --  >90 >90 >90 >90 >90  --  >90  < > >90 >90 >90 >90 65 50  < >  --    GFRNA >90  --  >90 >90 >90 >90 >90  --  >90  < > >90 >90 >90 >90 56 43  < >  --    ANIONGAP 12  --  11 13 12 13 14  --  13  < > 11 14 15 14 18 17  < >  --    LACTA  --   --   --   --   --   --   --   --   --   --  1.9 2.0 2.4* 2.1* 2.5* 2.4*  --  2.2*   BILIRUBIN 21.5*  --  19.9*  --  22.5* 22.8*  --   --  26.5*  --  31.6* 31.7* 32.0* 38.0* 40.7* 43.2*  < >  --    AST 51*  --  41*  --  46* 57*  --   --  71*  --  93* 107* 116* 118* 117* 123*  < >  --    GPT 54  --  54  --  54 55  --   --  56  --  61 66 67 64 61 61  < >  --    ALKPT 98  --   98  --  95 87  --   --  85  --  89 100 99 96 100 104  < >  --    ALBUMIN 3.0*  --  3.3*  --  3.5* 3.6  --   --  3.5*  --  3.6 3.5* 3.5* 3.3* 3.5* 3.7  < >  --    MG 2.6*  --  2.1 2.5* 2.8* 2.6* 2.9*  --  2.6*  < > 2.8* 2.5* 2.5* 2.6* 2.8* 2.8*  < >  --    CALCIUM 8.0*  --  6.9* 7.9* 8.0* 8.2* 8.1*  --  7.8*  < > 8.5 8.2* 8.0* 7.9* 7.9* 7.6*  < >  --    PHOS 1.4*  --  1.3* 1.2* 1.5* 2.0* 1.5*  --  2.0*  < > 2.5 2.3* 2.5 2.2* 2.7 3.1  < >  --    CLEO  --  1.21 0.97* 1.20 1.17 1.19 1.10*  --  1.14*  < > 1.10* 1.17 1.06* 1.11* 0.96* 1.00*  < >  --    < > = values in this interval not displayed.    Recent Labs  Lab 03/13/18  0630 03/12/18  1050 03/12/18  0340 03/11/18  1620   MG 2.6*  --  2.1 2.5*   CALCIUM 8.0*  --  6.9* 7.9*   PHOS 1.4*  --  1.3* 1.2*   CLEO  --  1.21 0.97* 1.20      Other labs and CBC/Iron Panel    Recent Labs  Lab 03/11/18  1500   IRON 128   PST 85*       Recent Labs  Lab 03/13/18  0630 03/12/18  0340 03/11/18  1620 03/11/18  0900 03/11/18  0410  03/08/18  0920 03/08/18  0310  03/06/18  1205   WBC 5.5 5.3  --   --  4.1*  < >  --   --   < >  --    HGB 6.8* 7.2* 6.7*  --  7.0*  < > 6.2* 7.0*  < >  --    HCT 19.4* 20.3* 19.3*  --  20.3*  < > 17.1* 19.5*  < >  --    PLT 23* 27*  --  34* 27*  < >  --   --   < >  --    INR 2.7  --   --   --   --   --  2.5 2.2  < >  --    PTT  --   --   --   --   --   --   --   --   --  71*   < > = values in this interval not displayed.    Recent Labs  Lab 03/06/18  1500   LIPA 578*     Urine Panel    Recent Labs  Lab 03/06/18  1315   UCR 61.00   CYNDI 39     Urinalysis    Recent Labs  Lab 03/06/18  1315   USPG 1.010   UPROT 30*   UWBC TRACE*   URBC SMALL*   UNITR NEGATIVE   UBILI POSITIVE*   UPH 6.5   UROB 0.2   UBACTR FEW*         Urinalysis:    Lab Results   Component Value Date    USPG 1.010 03/06/2018    UTPELC 111 (H) 03/06/2018    UWBC TRACE (A) 03/06/2018    URBC SMALL (A) 03/06/2018    UBILI POSITIVE (A) 03/06/2018    UPH 6.5 03/06/2018    UBACTR FEW (A) 03/06/2018        Imaging: Reviewed    Assessment:   48 year old male with Acute liver failure from alcoholic liver disease-on MARS (Bilirubin 26.5, LFTs improving)  Improved after CVVH  On broad spectrum antibiotics  GI bleed, stable now-transfused 3 units yesterday  No obvious infectious issues  No evidence of SBP    Plan:  Modify antibiotics-   Will change to PO cipro qod  PO Nystatin QID  D/C others      Discussed with: Critical care    Anupama Cordova MD  Infectious Disease    Pager: [phone]  Office: [phone]

## 2020-04-09 NOTE — PHYSICAL THERAPY INITIAL EVALUATION ADULT - PLANNED THERAPY INTERVENTIONS, PT EVAL
1. GOAL: In 3 weeks, pt will navigate 5 steps w/ supervision./bed mobility training/gait training/transfer training

## 2020-04-09 NOTE — PROGRESS NOTE ADULT - ATTENDING COMMENTS
Patient seen and examined with Dr. Hernandez    I agree with his interval history exam and plans as noted above    Reports feeling a little better today  Completed a course of hydroxychloroquine  maintaining adequate oxygenation on RA at this point  supportive care    Morris Prater MD  [phone]  After 5pm/weekends [phone]

## 2020-04-09 NOTE — PROGRESS NOTE ADULT - ASSESSMENT
This a 65 y/o male PMH ACC/AHA stage D HF due to NICM HM2 LVAD, TV annuloplasty ring 9/12/17 as destination therapy due to severe peripheral artery disease with significant stenosis, SIADH, Depression, CKD-3, with hyperkalemia past E. coli UTIs. Today he reports generalized weakness since 4 days, chills since 3 days, non-specific abdominal pain and constipation since 2 days with loose stools today.  Reports +Covid-19 sick contacts at home. Denies cough, difficulty breathing, chest pain, palpitations. Directed to [facility] ED r/o COVID. Seen by ID -BCx & urine Cx sent, COVID 19 PCR positive- started on Plaquenil, cefepime and flagyl. Renal function improved with volume resuscitation, home BP medications currently on hold.   4/2: VSS, Per ID d/c cefepime and flagyl today. Continue Plaquenil taper. INR 4.27 holding Coumadin tonight, Of note overnight patient had two febrile episodes, tylenol was given. MAPs went down to 30, IV fluid was given and MAPs improved.   4/3 temp overnight max 101.5- tylenol prn; wbc 3; CRP 5.6 today; continue to monitor closely on plaquenil;  MAP's improved today 70's- IV fluid 500 cc normal saline as per Dr. Iyer   continue to monitor O2 sats: 98% RA ; anticoagulation with coumadin INR 3.08- coumadin 2 mg this evening  4/4 VSS ; tmax 100.9 overnight;  MAP 80's; resume bph meds today and d/c aleman sun/ mon  anticoagulation with coumadin INR 2.0- coumadin 2.5 mg this evening;  today, CRP 10.1- plaquenil taper as per ID, continue to monitor closely   4/5 VSS, complete Plaquenil taper tonight, RA O2 Sat 96%, INR 2.76, Coumadin 2mg per HF. Aleman dc'd today. Possible d/c home Monday if stable.   4/6 VSS, pt voiding. MAPs>90, resumed back on lisinopril 10 daily, also 500ml NS bolus per HF. INR 4.12, Coumadin on hold tonight.   4/7: VSS, MAPs 68-88. Increased Lisinopril to 10 mg daily, gave 50cc NS bolus and started fluids at 75cc/hr for total of 500cc per HF. Coumadin on hold tonight for INR 4.88. Ensure started to supplement PO intake per nutrition recs.   4/8  INR  4.12     held coumadin    ,,   dc lisinopril    for elevated creatinine   MAPS  60  4/9    MAP  72   amio load for VT,    CRP 8.22,  creat down to 1.34

## 2020-04-09 NOTE — PROGRESS NOTE ADULT - SUBJECTIVE AND OBJECTIVE BOX
Subjective: Patient seen and examined resting in bed. Patient has no complaints today. States that his lower back pain and abdominal pain in improving. Denies any n/v/f/c/d.     Medications:  acetaminophen   Tablet .. 650 milliGRAM(s) Oral every 6 hours PRN  aMIOdarone    Tablet   Oral   aMIOdarone    Tablet 400 milliGRAM(s) Oral every 8 hours  aspirin enteric coated 81 milliGRAM(s) Oral daily  chlorhexidine 4% Liquid 1 Application(s) Topical <User Schedule>  famotidine    Tablet 20 milliGRAM(s) Oral daily  finasteride 5 milliGRAM(s) Oral daily  mirtazapine 7.5 milliGRAM(s) Oral at bedtime  polyethylene glycol 3350 17 Gram(s) Oral daily PRN  senna 2 Tablet(s) Oral at bedtime  sodium chloride 0.9% lock flush 3 milliLiter(s) IV Push every 8 hours  tamsulosin 0.4 milliGRAM(s) Oral at bedtime      Vitals:  Vital Signs Last 24 Hours  T(C): 36.7 (04-09-20 @ 10:00), Max: 36.9 (04-08-20 @ 21:24)  HR: 112 (04-09-20 @ 10:00) (100 - 112)  BP: --  RR: 18 (04-09-20 @ 10:00) (18 - 18)  SpO2: 96% (04-09-20 @ 10:00) (94% - 98%)    Tele: SR/-120  O2 sat: 94-96%    I&O's Summary    08 Apr 2020 07:01  -  09 Apr 2020 07:00  --------------------------------------------------------  IN: 340 mL / OUT: 625 mL / NET: -285 mL      Physical Exam:   General: No distress. Frail appearing  HEENT: EOM intact.  Neck: Neck supple. JVP mildly elevated. No masses  Chest: Clear to auscultation bilaterally  CV: +VAD hum  : [place] with clear yellow urine, no sediments   Abdomen: Soft, non-distended, not tender to palpation  Driveline exit site: Clean, dry, and intact  Skin: No rashes or skin breakdown  Neurology: Alert and oriented. Sensation intact  Psych: Affect normal    LVAD Interrogation  VAD TYPE: HM2  Speed: 8800  Flow: 3.8  Power: 4.4  PI: 3.3  Assessment of driveline exit site: dressing c/d/i  Programming changes: no changes were made    Labs:                        12.6   4.96  )-----------( 261      ( 09 Apr 2020 05:56 )             38.5     04-09    133<L>  |  99  |  51<H>  ----------------------------<  124<H>  4.6   |  20<L>  |  1.34<H>    Ca    8.8      09 Apr 2020 05:56  Mg     2.4     04-08    TPro  7.7  /  Alb  3.4  /  TBili  0.3  /  DBili  x   /  AST  30  /  ALT  18  /  AlkPhos  63  04-09    PT/INR - ( 09 Apr 2020 10:02 )   PT: 36.1 sec;   INR: 3.06 ratio         PTT - ( 08 Apr 2020 12:10 )  PTT:50.4 sec    Serum Pro-Brain Natriuretic Peptide: 397 pg/mL (04-04 @ 05:53)    Lactate Dehydrogenase, Serum: 375 U/L (04-09 @ 05:56)  Lactate Dehydrogenase, Serum: 483 U/L (04-08 @ 12:10)  Lactate Dehydrogenase, Serum: 447 U/L (04-07 @ 08:38)      Imaging Studies   < from: Limited Transthoracic Echo (04.08.20 @ 15:20) >  Conclusions:  Heartmate II at 5pm  Speed: 8800  1. Tethered mitral valve leflets with normal opening. Mild  mitral regurgitation.  2. The aortic valve is predominately closed. Mild  continuious  aortic regurgitation.  3. Severe global left ventricular systolic dysfunction.  Septum midline. LVAD not seen. Limited study.  4. The right ventricle is not well visualized. Right  ventricular enlargement (4.4cm at mid RV)  with decreased  right ventricular systolic function.  ADDENDUM 4/9/2020: add RAP  IVC was 2.8cm  with minimal collapse. RAP about 15mmHg  < end of copied text >

## 2020-04-09 NOTE — PROGRESS NOTE ADULT - PROBLEM SELECTOR PLAN 5
- Noted to go into VT (lasting >1 min on 4/7)  - Continue Amio load 400 mg q8hrs x 12 doses followed by Amio 200 mg daily (was initially started on 4/7)

## 2020-04-09 NOTE — PROGRESS NOTE ADULT - PROBLEM SELECTOR PLAN 2
Heart Failure followinasa 81 daily     Continue AC on Coumadin daily - hold tonight for INR   3.09  Continue to monitor daily labs CBC/CMP/INR/LDH

## 2020-04-09 NOTE — PHYSICAL THERAPY INITIAL EVALUATION ADULT - ADDITIONAL COMMENTS
Pt lives in a pvt home w/ his sister, 5 steps to enter none inside. PTA pt reports being independent w/ all functional mobility & did not utilize an AD for ambulation.

## 2020-04-09 NOTE — PROGRESS NOTE ADULT - PROBLEM SELECTOR PLAN 2
- Landrum replaced 4/6 for retention, continue home BPH meds  - Noted to have elevated BUN/Cr, currently stable @ Cr 1.34  - Was given fluids and lasix for low UOP however has had minimal response, continue to monitor

## 2020-04-09 NOTE — PROGRESS NOTE ADULT - PROBLEM SELECTOR PLAN 3
- Currently holding blood pressure medications for MAP 50-60s  - ECHO completed 4/8 and will be reviewed by Dr. Iyer

## 2020-04-09 NOTE — PROGRESS NOTE ADULT - SUBJECTIVE AND OBJECTIVE BOX
Follow Up: LVAD, Covid-19 infection     Interval History/ROS:Patient is a 64y old  Male who presents with a chief complaint of + COVID   H:    HM2 (09 Apr 2020 14:55)  - no acute events reported overnight  - patient assessed at bedside. Reports persistent loose stools  - on RA. Denied dyspnea    Allergies    No Known Allergies    Intolerances        ANTIMICROBIALS:      OTHER MEDS:  acetaminophen   Tablet .. 650 milliGRAM(s) Oral every 6 hours PRN  aMIOdarone    Tablet   Oral   aMIOdarone    Tablet 400 milliGRAM(s) Oral every 8 hours  aspirin enteric coated 81 milliGRAM(s) Oral daily  chlorhexidine 4% Liquid 1 Application(s) Topical <User Schedule>  famotidine    Tablet 20 milliGRAM(s) Oral daily  finasteride 5 milliGRAM(s) Oral daily  mirtazapine 7.5 milliGRAM(s) Oral at bedtime  polyethylene glycol 3350 17 Gram(s) Oral daily PRN  senna 2 Tablet(s) Oral at bedtime  sodium chloride 0.9% lock flush 3 milliLiter(s) IV Push every 8 hours  tamsulosin 0.4 milliGRAM(s) Oral at bedtime      Vital Signs Last 24 Hrs  T(C): 36.7 (09 Apr 2020 14:00), Max: 36.9 (08 Apr 2020 21:24)  T(F): 98 (09 Apr 2020 14:00), Max: 98.4 (08 Apr 2020 21:24)  HR: 110 (09 Apr 2020 14:00) (100 - 112)  BP: --  BP(mean): 82 (09 Apr 2020 16:04) (68 - 82)  RR: 18 (09 Apr 2020 14:00) (18 - 18)  SpO2: 97% (09 Apr 2020 14:00) (96% - 98%)    EXAM:  Constitutional: Not in acute distress  Eyes: No icterus. Pupils b/l reactive to light.  Oral cavity: Clear, no lesions  Neck: No neck vein distension noted  RS: Chest clear to auscultation bilaterally. No wheeze/rhonchi/crepitations.  CVS: Mechanical sounds of LVAD. No murmurs/rubs/gallops.  Abdomen: Soft. No guarding/rigidity/tenderness. LVAD site under dressing.  : Landrum present  Extremities: Warm. No pedal edema  Skin: No lesions noted  Vascular: No evidence of phlebitis  Neuro: Alert, oriented to time/place/person                        12.6   4.96  )-----------( 261      ( 09 Apr 2020 05:56 )             38.5       04-09    133<L>  |  99  |  51<H>  ----------------------------<  124<H>  4.6   |  20<L>  |  1.34<H>    Ca    8.8      09 Apr 2020 05:56  Mg     2.4     04-08    TPro  7.7  /  Alb  3.4  /  TBili  0.3  /  DBili  x   /  AST  30  /  ALT  18  /  AlkPhos  63  04-09          MICROBIOLOGY:    RADIOLOGY:  < from: Xray Chest 1 View- PORTABLE-Routine (04.09.20 @ 05:02) >  IMPRESSION:  New opacity at the right base. The left lung is clear.  LVAD is again noted.  < end of copied text >

## 2020-04-09 NOTE — PROGRESS NOTE ADULT - ATTENDING COMMENTS
Clinically stable with improved urine output. Will continue to monitor.     Please call me with questions at [phone].

## 2020-04-10 LAB
ALBUMIN SERPL ELPH-MCNC: 3.7 G/DL — SIGNIFICANT CHANGE UP (ref 3.3–5)
ALP SERPL-CCNC: 69 U/L — SIGNIFICANT CHANGE UP (ref 40–120)
ALT FLD-CCNC: 15 U/L — SIGNIFICANT CHANGE UP (ref 10–45)
ANION GAP SERPL CALC-SCNC: 15 MMOL/L — SIGNIFICANT CHANGE UP (ref 5–17)
ANION GAP SERPL CALC-SCNC: 17 MMOL/L — SIGNIFICANT CHANGE UP (ref 5–17)
APTT BLD: 39 SEC — HIGH (ref 27.5–36.3)
AST SERPL-CCNC: 25 U/L — SIGNIFICANT CHANGE UP (ref 10–40)
BASOPHILS # BLD AUTO: 0.02 K/UL — SIGNIFICANT CHANGE UP (ref 0–0.2)
BASOPHILS NFR BLD AUTO: 0.3 % — SIGNIFICANT CHANGE UP (ref 0–2)
BILIRUB SERPL-MCNC: 0.4 MG/DL — SIGNIFICANT CHANGE UP (ref 0.2–1.2)
BUN SERPL-MCNC: 40 MG/DL — HIGH (ref 7–23)
BUN SERPL-MCNC: 42 MG/DL — HIGH (ref 7–23)
CALCIUM SERPL-MCNC: 9.5 MG/DL — SIGNIFICANT CHANGE UP (ref 8.4–10.5)
CALCIUM SERPL-MCNC: 9.6 MG/DL — SIGNIFICANT CHANGE UP (ref 8.4–10.5)
CHLORIDE SERPL-SCNC: 98 MMOL/L — SIGNIFICANT CHANGE UP (ref 96–108)
CHLORIDE SERPL-SCNC: 99 MMOL/L — SIGNIFICANT CHANGE UP (ref 96–108)
CO2 SERPL-SCNC: 18 MMOL/L — LOW (ref 22–31)
CO2 SERPL-SCNC: 19 MMOL/L — LOW (ref 22–31)
CREAT SERPL-MCNC: 1.07 MG/DL — SIGNIFICANT CHANGE UP (ref 0.5–1.3)
CREAT SERPL-MCNC: 1.15 MG/DL — SIGNIFICANT CHANGE UP (ref 0.5–1.3)
CRP SERPL-MCNC: 5.19 MG/DL — HIGH (ref 0–0.4)
EOSINOPHIL # BLD AUTO: 0.11 K/UL — SIGNIFICANT CHANGE UP (ref 0–0.5)
EOSINOPHIL NFR BLD AUTO: 1.9 % — SIGNIFICANT CHANGE UP (ref 0–6)
GLUCOSE SERPL-MCNC: 107 MG/DL — HIGH (ref 70–99)
GLUCOSE SERPL-MCNC: 95 MG/DL — SIGNIFICANT CHANGE UP (ref 70–99)
HCT VFR BLD CALC: 41.1 % — SIGNIFICANT CHANGE UP (ref 39–50)
HGB BLD-MCNC: 13 G/DL — SIGNIFICANT CHANGE UP (ref 13–17)
IMM GRANULOCYTES NFR BLD AUTO: 1.7 % — HIGH (ref 0–1.5)
INR BLD: 3.02 RATIO — HIGH (ref 0.88–1.16)
LDH SERPL L TO P-CCNC: 373 U/L — HIGH (ref 50–242)
LYMPHOCYTES # BLD AUTO: 0.85 K/UL — LOW (ref 1–3.3)
LYMPHOCYTES # BLD AUTO: 14.7 % — SIGNIFICANT CHANGE UP (ref 13–44)
MCHC RBC-ENTMCNC: 28.1 PG — SIGNIFICANT CHANGE UP (ref 27–34)
MCHC RBC-ENTMCNC: 31.6 GM/DL — LOW (ref 32–36)
MCV RBC AUTO: 89 FL — SIGNIFICANT CHANGE UP (ref 80–100)
MONOCYTES # BLD AUTO: 0.52 K/UL — SIGNIFICANT CHANGE UP (ref 0–0.9)
MONOCYTES NFR BLD AUTO: 9 % — SIGNIFICANT CHANGE UP (ref 2–14)
NEUTROPHILS # BLD AUTO: 4.17 K/UL — SIGNIFICANT CHANGE UP (ref 1.8–7.4)
NEUTROPHILS NFR BLD AUTO: 72.4 % — SIGNIFICANT CHANGE UP (ref 43–77)
NRBC # BLD: 0 /100 WBCS — SIGNIFICANT CHANGE UP (ref 0–0)
PLATELET # BLD AUTO: 324 K/UL — SIGNIFICANT CHANGE UP (ref 150–400)
POTASSIUM SERPL-MCNC: 5.2 MMOL/L — SIGNIFICANT CHANGE UP (ref 3.5–5.3)
POTASSIUM SERPL-MCNC: 5.7 MMOL/L — HIGH (ref 3.5–5.3)
POTASSIUM SERPL-SCNC: 5.2 MMOL/L — SIGNIFICANT CHANGE UP (ref 3.5–5.3)
POTASSIUM SERPL-SCNC: 5.7 MMOL/L — HIGH (ref 3.5–5.3)
PROT SERPL-MCNC: 8.4 G/DL — HIGH (ref 6–8.3)
PROTHROM AB SERPL-ACNC: 35.6 SEC — HIGH (ref 10–12.9)
RBC # BLD: 4.62 M/UL — SIGNIFICANT CHANGE UP (ref 4.2–5.8)
RBC # FLD: 13.4 % — SIGNIFICANT CHANGE UP (ref 10.3–14.5)
SODIUM SERPL-SCNC: 133 MMOL/L — LOW (ref 135–145)
SODIUM SERPL-SCNC: 133 MMOL/L — LOW (ref 135–145)
WBC # BLD: 5.77 K/UL — SIGNIFICANT CHANGE UP (ref 3.8–10.5)
WBC # FLD AUTO: 5.77 K/UL — SIGNIFICANT CHANGE UP (ref 3.8–10.5)

## 2020-04-10 PROCEDURE — 99232 SBSQ HOSP IP/OBS MODERATE 35: CPT | Mod: GC

## 2020-04-10 PROCEDURE — 99233 SBSQ HOSP IP/OBS HIGH 50: CPT | Mod: 25

## 2020-04-10 PROCEDURE — 93750 INTERROGATION VAD IN PERSON: CPT

## 2020-04-10 RX ADMIN — SODIUM CHLORIDE 3 MILLILITER(S): 9 INJECTION INTRAMUSCULAR; INTRAVENOUS; SUBCUTANEOUS at 22:15

## 2020-04-10 RX ADMIN — AMIODARONE HYDROCHLORIDE 400 MILLIGRAM(S): 400 TABLET ORAL at 14:00

## 2020-04-10 RX ADMIN — AMIODARONE HYDROCHLORIDE 400 MILLIGRAM(S): 400 TABLET ORAL at 22:19

## 2020-04-10 RX ADMIN — FAMOTIDINE 20 MILLIGRAM(S): 10 INJECTION INTRAVENOUS at 17:01

## 2020-04-10 RX ADMIN — FINASTERIDE 5 MILLIGRAM(S): 5 TABLET, FILM COATED ORAL at 17:01

## 2020-04-10 RX ADMIN — SODIUM CHLORIDE 3 MILLILITER(S): 9 INJECTION INTRAMUSCULAR; INTRAVENOUS; SUBCUTANEOUS at 05:00

## 2020-04-10 RX ADMIN — TAMSULOSIN HYDROCHLORIDE 0.4 MILLIGRAM(S): 0.4 CAPSULE ORAL at 22:19

## 2020-04-10 RX ADMIN — MIRTAZAPINE 7.5 MILLIGRAM(S): 45 TABLET, ORALLY DISINTEGRATING ORAL at 22:19

## 2020-04-10 RX ADMIN — AMIODARONE HYDROCHLORIDE 400 MILLIGRAM(S): 400 TABLET ORAL at 05:01

## 2020-04-10 RX ADMIN — SODIUM CHLORIDE 3 MILLILITER(S): 9 INJECTION INTRAMUSCULAR; INTRAVENOUS; SUBCUTANEOUS at 14:00

## 2020-04-10 RX ADMIN — Medication 81 MILLIGRAM(S): at 17:01

## 2020-04-10 RX ADMIN — CHLORHEXIDINE GLUCONATE 1 APPLICATION(S): 213 SOLUTION TOPICAL at 08:00

## 2020-04-10 NOTE — PROGRESS NOTE ADULT - ASSESSMENT
ASSESSMENT:  64/M with PMH CHF s/p LVAD Sept 2017 (on Warfarin), CAD, s/p TVR, SIADH, Depression, CKD-3, past E. coli UTIs  P/w generalized weakness since 4 days; chills since 3 days; non-specific abdominal pain and constipation since 2 days and loose stools today  H/o sick contacts with Covid-19 at home. Imaging concerning for new opacities in RUL, Covid-19 positive  =========  LVAD with Covid-19 infection  - Covid-19 Labs - ; Procalcitonin 0.45 elevated ferritin and CRP  - completed 5 day course of PO Hydroxychloroquine  - repeat CXR showed new Rt lung opacity. Pt afebrile, on RA, with no acute symptoms of cough. Would monitor off antibiotics for now    RECOMMENDATIONS:  - Continue supplemental O2 and supportive care per primary team  - completed course of plaquenil and oxygenation is stable on RA  - Continue Contact and Airborne Isolation precautions    ABELARDO Forbes, MD  Fellow, Infectious Diseases  Pager: [phone]  After 5pm and on Weekends: Call [phone]

## 2020-04-10 NOTE — PROGRESS NOTE ADULT - ASSESSMENT
This a 63 y/o male PMH ACC/AHA stage D HF due to NICM HM2 LVAD, TV annuloplasty ring 9/12/17 as destination therapy due to severe peripheral artery disease with significant stenosis, SIADH, Depression, CKD-3, with hyperkalemia past E. coli UTIs. Today he reports generalized weakness since 4 days, chills since 3 days, non-specific abdominal pain and constipation since 2 days with loose stools today.  Reports +Covid-19 sick contacts at home. Denies cough, difficulty breathing, chest pain, palpitations. Directed to [facility] ED r/o COVID. Seen by ID -BCx & urine Cx sent, COVID 19 PCR positive- started on Plaquenil, cefepime and flagyl. Renal function improved with volume resuscitation, home BP medications currently on hold.   4/2: VSS, Per ID d/c cefepime and flagyl today. Continue Plaquenil taper. INR 4.27 holding Coumadin tonight, Of note overnight patient had two febrile episodes, tylenol was given. MAPs went down to 30, IV fluid was given and MAPs improved.   4/3 temp overnight max 101.5- tylenol prn; wbc 3; CRP 5.6 today; continue to monitor closely on plaquenil;  MAP's improved today 70's- IV fluid 500 cc normal saline as per Dr. Iyer   continue to monitor O2 sats: 98% RA ; anticoagulation with coumadin INR 3.08- coumadin 2 mg this evening  4/4 VSS ; tmax 100.9 overnight;  MAP 80's; resume bph meds today and d/c aleman sun/ mon  anticoagulation with coumadin INR 2.0- coumadin 2.5 mg this evening;  today, CRP 10.1- plaquenil taper as per ID, continue to monitor closely   4/5 VSS, complete Plaquenil taper tonight, RA O2 Sat 96%, INR 2.76, Coumadin 2mg per HF. Aleman dc'd today. Possible d/c home Monday if stable.   4/6 VSS, pt voiding. MAPs>90, resumed back on lisinopril 10 daily, also 500ml NS bolus per HF. INR 4.12, Coumadin on hold tonight.   4/7: VSS, MAPs 68-88. Increased Lisinopril to 10 mg daily, gave 50cc NS bolus and started fluids at 75cc/hr for total of 500cc per HF. Coumadin on hold tonight for INR 4.88. Ensure started to supplement PO intake per nutrition recs.   4/8 INR  4.12 held coumadin ,, dc lisinopril for elevated creatinine   MAPS  60  4/9 MAP  72 amio load for VT, CRP 8.22, creat down to 1.34  4/10: VSS, afebrile overnight, O2 sat 95% on RA, MAPs 72-74, negative 720cc fluid balance. CRP trending down, 5.19 today. . INR 3.02 --> Coumadin held. K 5.7 this AM --> repeat K 5.2. Aleman removed today --> TOV   Discharge planning: home w/ PT when stable - possibly over weekend/Monday 65 y/o male PMH ACC/AHA stage D HF due to NICM HM2 LVAD, TV annuloplasty ring 9/12/17 as destination therapy due to severe peripheral artery disease with significant stenosis, SIADH, Depression, CKD-3, with hyperkalemia past E. coli UTIs. Today he reports generalized weakness since 4 days, chills since 3 days, non-specific abdominal pain and constipation since 2 days with loose stools today.  Reports +Covid-19 sick contacts at home. Denies cough, difficulty breathing, chest pain, palpitations. Directed to [facility] ED r/o COVID. Seen by ID -BCx & urine Cx sent, COVID 19 PCR positive- started on Plaquenil, cefepime and flagyl. Renal function improved with volume resuscitation, home BP medications currently on hold.   4/2: VSS, Per ID d/c cefepime and flagyl today. Continue Plaquenil taper. INR 4.27 holding Coumadin tonight, Of note overnight patient had two febrile episodes, tylenol was given. MAPs went down to 30, IV fluid was given and MAPs improved.   4/3 temp overnight max 101.5- tylenol prn; wbc 3; CRP 5.6 today; continue to monitor closely on plaquenil;  MAP's improved today 70's- IV fluid 500 cc normal saline as per Dr. Iyer   continue to monitor O2 sats: 98% RA ; anticoagulation with coumadin INR 3.08- coumadin 2 mg this evening  4/4 VSS ; tmax 100.9 overnight;  MAP 80's; resume bph meds today and d/c aleman sun/ mon  anticoagulation with coumadin INR 2.0- coumadin 2.5 mg this evening;  today, CRP 10.1- plaquenil taper as per ID, continue to monitor closely   4/5 VSS, complete Plaquenil taper tonight, RA O2 Sat 96%, INR 2.76, Coumadin 2mg per HF. Aleman dc'd today. Possible d/c home Monday if stable.   4/6 VSS, pt voiding. MAPs>90, resumed back on lisinopril 10 daily, also 500ml NS bolus per HF. INR 4.12, Coumadin on hold tonight.   4/7: VSS, MAPs 68-88. Increased Lisinopril to 10 mg daily, gave 50cc NS bolus and started fluids at 75cc/hr for total of 500cc per HF. Coumadin on hold tonight for INR 4.88. Ensure started to supplement PO intake per nutrition recs.   4/8 INR  4.12 held coumadin ,, dc lisinopril for elevated creatinine   MAPS  60  4/9 MAP  72 amio load for VT, CRP 8.22, creat down to 1.34  4/10: VSS, afebrile overnight, O2 sat 95% on RA, MAPs 72-74, negative 720cc fluid balance. CRP trending down, 5.19 today. . INR 3.02 --> Coumadin held. K 5.7 this AM --> repeat K 5.2. Aleman removed today --> TOV   Discharge planning: home w/ PT when stable - possibly over weekend/Monday

## 2020-04-10 NOTE — PROGRESS NOTE ADULT - PROBLEM SELECTOR PLAN 3
NSVT on 4/7  On Amio load   To start Amio 200 mg QD tomorrow 4/11  Continue AC with Coumadin - hold tonight for INR 3.02

## 2020-04-10 NOTE — PROGRESS NOTE ADULT - SUBJECTIVE AND OBJECTIVE BOX
Subjective: He continues to complain of abdominal pain, no change from yesterday.     Medications:  acetaminophen   Tablet .. 650 milliGRAM(s) Oral every 6 hours PRN  aMIOdarone    Tablet   Oral   aMIOdarone    Tablet 400 milliGRAM(s) Oral every 8 hours  aspirin enteric coated 81 milliGRAM(s) Oral daily  chlorhexidine 4% Liquid 1 Application(s) Topical <User Schedule>  famotidine    Tablet 20 milliGRAM(s) Oral daily  finasteride 5 milliGRAM(s) Oral daily  mirtazapine 7.5 milliGRAM(s) Oral at bedtime  polyethylene glycol 3350 17 Gram(s) Oral daily PRN  senna 2 Tablet(s) Oral at bedtime  sodium chloride 0.9% lock flush 3 milliLiter(s) IV Push every 8 hours  tamsulosin 0.4 milliGRAM(s) Oral at bedtime      Physical Exam:    Vital Signs Last 24 Hrs  T(C): 36.7 (10 Apr 2020 13:40), Max: 36.9 (10 Apr 2020 11:00)  T(F): 98 (10 Apr 2020 13:40), Max: 98.4 (10 Apr 2020 11:00)  HR: 110 (10 Apr 2020 13:40) (100 - 118)  BP(mean): 72 (10 Apr 2020 13:40) (72 - 80)  RR: 18 (10 Apr 2020 13:40) (18 - 18)  SpO2: 96% (10 Apr 2020 13:40) (90% - 96%)      I&O's Summary    09 Apr 2020 07:01  -  10 Apr 2020 07:00  --------------------------------------------------------  IN: 160 mL / OUT: 880 mL / NET: -720 mL    General: No distress. Comfortable.  HEENT: EOM intact.  Neck: Neck supple. JVP not elevated. No masses  CV: No distal pulses  Abdomen: Soft, non-distended, non-tender  Driveline exit site: Clean, dry, and intact  Skin: No rashes or skin breakdown  Neurology: Alert and oriented times three. Sensation intact  Psych: Affect normal    LVAD Interrogation: Heart Mate II  RPMs: 8800  Power: 4.5  Flow: 3.9  PI: 4  Event: PI events, less  No programming changes were made    Labs:                        13.0   5.77  )-----------( 324      ( 10 Apr 2020 06:33 )             41.1     04-10    133<L>  |  98  |  40<H>  ----------------------------<  107<H>  5.2   |  18<L>  |  1.07    Ca    9.6      10 Apr 2020 11:12    TPro  8.4<H>  /  Alb  3.7  /  TBili  0.4  /  DBili  x   /  AST  25  /  ALT  15  /  AlkPhos  69  04-10    PT/INR - ( 10 Apr 2020 06:32 )   PT: 35.6 sec;   INR: 3.02 ratio    PTT - ( 10 Apr 2020 06:32 )  PTT:39.0 sec    Serum Pro-Brain Natriuretic Peptide: 397 pg/mL (04-04 @ 05:53)    Lactate Dehydrogenase, Serum: 373 U/L (04-10 @ 06:34)  Lactate Dehydrogenase, Serum: 375 U/L (04-09 @ 05:56)  Lactate Dehydrogenase, Serum: 483 U/L (04-08 @ 12:10)

## 2020-04-10 NOTE — PROGRESS NOTE ADULT - SUBJECTIVE AND OBJECTIVE BOX
Subjective: "I'm okay." Denies chest pain, SOB, palpitations, headache, dizziness and n/v/d. No acute events overnight.     Telemetry: -120     Vital Signs Last 24 Hrs  T(C): 36.7 (04-10-20 @ 13:40), Max: 36.9 (04-10-20 @ 11:00)  T(F): 98 (04-10-20 @ 13:40), Max: 98.4 (04-10-20 @ 11:00)  HR: 110 (04-10-20 @ 13:40) (100 - 118)  BP: --  RR: 18 (04-10-20 @ 13:40) (18 - 18)  SpO2: 96% (04-10-20 @ 13:40) (90% - 96%)           04-09 @ 07:01  -  04-10 @ 07:00  --------------------------------------------------------  IN: 160 mL / OUT: 880 mL / NET: -720 mL    Coumadin    [ x ] YES          [  ]      NO         Reason: LVAD                             13.0   5.77  )-----------( 324      ( 10 Apr 2020 06:33 )             41.1     04-10    133<L>  |  98  |  40<H>  ----------------------------<  107<H>  5.2   |  18<L>  |  1.07    Ca    9.6      10 Apr 2020 11:12    TPro  8.4<H>  /  Alb  3.7  /  TBili  0.4  /  DBili  x   /  AST  25  /  ALT  15  /  AlkPhos  69  04-10    PT/INR - ( 10 Apr 2020 06:32 )   PT: 35.6 sec;   INR: 3.02 ratio      PTT - ( 10 Apr 2020 06:32 )  PTT:39.0 sec    PHYSICAL EXAM:    Neurology: alert and oriented x 3, nonfocal, no gross deficits    CV:    Sternal Wound: CDI, Stable    Lungs:    Abdomen: soft, nontender, nondistended, (+) bowel sounds, (+) BM    :               Extremities:         acetaminophen Tablet .. 650 milliGRAM(s) Oral every 6 hours PRN  aMIOdarone Tablet   Oral   aMIOdarone Tablet 400 milliGRAM(s) Oral every 8 hours  aspirin enteric coated 81 milliGRAM(s) Oral daily  chlorhexidine 4% Liquid 1 Application(s) Topical <User Schedule>  famotidine Tablet 20 milliGRAM(s) Oral daily  finasteride 5 milliGRAM(s) Oral daily  mirtazapine 7.5 milliGRAM(s) Oral at bedtime  polyethylene glycol 3350 17 Gram(s) Oral daily PRN  senna 2 Tablet(s) Oral at bedtime  sodium chloride 0.9% lock flush 3 milliLiter(s) IV Push every 8 hours  tamsulosin 0.4 milliGRAM(s) Oral at bedtime      Physical Therapy Rec:   Home  [  ]   Home w/ PT  [ x ]  Rehab  [  ]    Discussed with Cardiothoracic Team at AM rounds. Subjective: "I'm okay." Denies chest pain, SOB, palpitations, headache, dizziness and n/v/d. No acute events overnight.     Telemetry: -120     Vital Signs Last 24 Hrs  T(C): 36.7 (04-10-20 @ 13:40), Max: 36.9 (04-10-20 @ 11:00)  T(F): 98 (04-10-20 @ 13:40), Max: 98.4 (04-10-20 @ 11:00)  HR: 110 (04-10-20 @ 13:40) (100 - 118)  BP: --  RR: 18 (04-10-20 @ 13:40) (18 - 18)  SpO2: 96% (04-10-20 @ 13:40) (90% - 96%)           04-09 @ 07:01  -  04-10 @ 07:00  --------------------------------------------------------  IN: 160 mL / OUT: 880 mL / NET: -720 mL    Coumadin    [ x ] YES          [  ]      NO         Reason: LVAD                             13.0   5.77  )-----------( 324      ( 10 Apr 2020 06:33 )             41.1     04-10    133<L>  |  98  |  40<H>  ----------------------------<  107<H>  5.2   |  18<L>  |  1.07    Ca    9.6      10 Apr 2020 11:12    TPro  8.4<H>  /  Alb  3.7  /  TBili  0.4  /  DBili  x   /  AST  25  /  ALT  15  /  AlkPhos  69  04-10    PT/INR - ( 10 Apr 2020 06:32 )   PT: 35.6 sec;   INR: 3.02 ratio      PTT - ( 10 Apr 2020 06:32 )  PTT:39.0 sec    PHYSICAL EXAM:    Neurology: alert and oriented x 3, nonfocal, no gross deficits    CV: (+) LVAD Hum     Lungs: B/L diminished at base     Abdomen: soft, nontender, nondistended, (+) bowel sounds, (+) BM; Mid abdominal driveline site with occlusive dressing in place --> C/D/I    : Indwelling aleman cath --> SD               Extremities: B/L LE (+) 1 pitting edema; negative calf tenderness.      acetaminophen Tablet .. 650 milliGRAM(s) Oral every 6 hours PRN  aMIOdarone Tablet   Oral   aMIOdarone Tablet 400 milliGRAM(s) Oral every 8 hours  aspirin enteric coated 81 milliGRAM(s) Oral daily  chlorhexidine 4% Liquid 1 Application(s) Topical <User Schedule>  famotidine Tablet 20 milliGRAM(s) Oral daily  finasteride 5 milliGRAM(s) Oral daily  mirtazapine 7.5 milliGRAM(s) Oral at bedtime  polyethylene glycol 3350 17 Gram(s) Oral daily PRN  senna 2 Tablet(s) Oral at bedtime  sodium chloride 0.9% lock flush 3 milliLiter(s) IV Push every 8 hours  tamsulosin 0.4 milliGRAM(s) Oral at bedtime      Physical Therapy Rec:   Home  [  ]   Home w/ PT  [ x ]  Rehab  [  ]    Discussed with Cardiothoracic Team at AM rounds.

## 2020-04-10 NOTE — PROGRESS NOTE ADULT - SUBJECTIVE AND OBJECTIVE BOX
Follow Up: LVAD, Covid-19 infection    Interval History/ROS:Patient is a 64y old  Male who presents with a chief complaint of Nausea/ (09 Apr 2020 17:27)  - no acute events reported overnight  - patient assessed at bedside. Denied cough, shortness of breath. Reported improvement in loose stools  - Landrum taken out today and he reports abdominal discomfort and hematuria    Allergies    No Known Allergies    Intolerances        ANTIMICROBIALS:      OTHER MEDS:  acetaminophen   Tablet .. 650 milliGRAM(s) Oral every 6 hours PRN  aMIOdarone    Tablet   Oral   aMIOdarone    Tablet 400 milliGRAM(s) Oral every 8 hours  aspirin enteric coated 81 milliGRAM(s) Oral daily  chlorhexidine 4% Liquid 1 Application(s) Topical <User Schedule>  famotidine    Tablet 20 milliGRAM(s) Oral daily  finasteride 5 milliGRAM(s) Oral daily  mirtazapine 7.5 milliGRAM(s) Oral at bedtime  polyethylene glycol 3350 17 Gram(s) Oral daily PRN  senna 2 Tablet(s) Oral at bedtime  sodium chloride 0.9% lock flush 3 milliLiter(s) IV Push every 8 hours  tamsulosin 0.4 milliGRAM(s) Oral at bedtime      Vital Signs Last 24 Hrs  T(C): 36.9 (10 Apr 2020 11:00), Max: 36.9 (10 Apr 2020 11:00)  T(F): 98.4 (10 Apr 2020 11:00), Max: 98.4 (10 Apr 2020 11:00)  HR: 106 (10 Apr 2020 11:00) (100 - 110)  BP: --  BP(mean): 74 (10 Apr 2020 11:00) (72 - 82)  RR: 18 (10 Apr 2020 11:00) (18 - 18)  SpO2: 96% (10 Apr 2020 11:00) (95% - 97%)    EXAM:  Constitutional: Not in acute distress  Eyes: No icterus. Pupils b/l reactive to light.  Oral cavity: Clear, no lesions  Neck: No neck vein distension noted  RS: Chest clear to auscultation bilaterally. No wheeze/rhonchi/crepitations.  CVS: Mechanical heart sounds heard. No murmurs/rubs/gallops.  Abdomen: Soft. No guarding/rigidity/tenderness. LVAD site under dressing.  : No acute abnormalities  Extremities: Warm. No pedal edema  Skin: No lesions noted  Vascular: No evidence of phlebitis  Neuro: Alert, oriented to time/place/person                        13.0   5.77  )-----------( 324      ( 10 Apr 2020 06:33 )             41.1       04-10    133<L>  |  98  |  40<H>  ----------------------------<  107<H>  5.2   |  18<L>  |  1.07    Ca    9.6      10 Apr 2020 11:12    TPro  8.4<H>  /  Alb  3.7  /  TBili  0.4  /  DBili  x   /  AST  25  /  ALT  15  /  AlkPhos  69  04-10

## 2020-04-10 NOTE — PROGRESS NOTE ADULT - PROBLEM SELECTOR PLAN 2
Heart Failure following  Continue ASA 81 daily   Continue AC with Coumadin - hold tonight for INR 3.02  Continue to monitor daily labs CBC/CMP/INR/LDH

## 2020-04-10 NOTE — PROGRESS NOTE ADULT - PROBLEM SELECTOR PLAN 1
COVID Isolation/ Droplet precautions  ID following  Completed Plaquenil taper  Daily CBC w/ diff & CRP  Discharge planning: home w/ PT when stable - possibly over weekend/Monday COVID Isolation/ Droplet precautions  ID following  Completed Plaquenil taper  Daily CBC w/ diff, CMP & CRP  Continue to monitor O2 sats  Discharge planning: home w/ PT when stable - possibly over weekend/Monday

## 2020-04-10 NOTE — PROGRESS NOTE ADULT - ASSESSMENT
64 year old man with ACC/AHA stage D HF due to NICM, s/p HM2 LVAD  in 9/12/17 as destination therapy due to severe peripheral artery disease with significant stenosis. He currently presents with COVID-19 accompanied by abdominal pain, diarrhea and acute renal failure. He completed a 5 day course of Plaquenil on 4/5    He is gradually improving, but continues to have supratherapeutic INR and abdominal pain. His renal function has normalized and he has fewer PI events.     Please call me with questions at [phone].

## 2020-04-10 NOTE — PROGRESS NOTE ADULT - ATTENDING COMMENTS
Patient seen and examined with Dr. Hernandez    I agree with his interval history exam and plans as noted above    COVID-19+  remains on room air  continue supportive care  s/p Plaquenil course    Morris Prater MD  [phone]  After 5pm/weekends [phone]

## 2020-04-11 LAB
ALBUMIN SERPL ELPH-MCNC: 3.7 G/DL — SIGNIFICANT CHANGE UP (ref 3.3–5)
ALP SERPL-CCNC: 67 U/L — SIGNIFICANT CHANGE UP (ref 40–120)
ALT FLD-CCNC: 11 U/L — SIGNIFICANT CHANGE UP (ref 10–45)
ANION GAP SERPL CALC-SCNC: 16 MMOL/L — SIGNIFICANT CHANGE UP (ref 5–17)
AST SERPL-CCNC: 23 U/L — SIGNIFICANT CHANGE UP (ref 10–40)
BILIRUB SERPL-MCNC: 0.4 MG/DL — SIGNIFICANT CHANGE UP (ref 0.2–1.2)
BUN SERPL-MCNC: 35 MG/DL — HIGH (ref 7–23)
CALCIUM SERPL-MCNC: 9.4 MG/DL — SIGNIFICANT CHANGE UP (ref 8.4–10.5)
CHLORIDE SERPL-SCNC: 100 MMOL/L — SIGNIFICANT CHANGE UP (ref 96–108)
CO2 SERPL-SCNC: 20 MMOL/L — LOW (ref 22–31)
CREAT SERPL-MCNC: 1.1 MG/DL — SIGNIFICANT CHANGE UP (ref 0.5–1.3)
CRP SERPL-MCNC: 2.74 MG/DL — HIGH (ref 0–0.4)
D DIMER BLD IA.RAPID-MCNC: 2103 NG/ML DDU — HIGH
ERYTHROCYTE [SEDIMENTATION RATE] IN BLOOD: 108 MM/HR — HIGH (ref 0–20)
FERRITIN SERPL-MCNC: 538 NG/ML — HIGH (ref 30–400)
GLUCOSE SERPL-MCNC: 99 MG/DL — SIGNIFICANT CHANGE UP (ref 70–99)
HCT VFR BLD CALC: 40.3 % — SIGNIFICANT CHANGE UP (ref 39–50)
HGB BLD-MCNC: 13.3 G/DL — SIGNIFICANT CHANGE UP (ref 13–17)
INR BLD: 3.32 RATIO — HIGH (ref 0.88–1.16)
LDH SERPL L TO P-CCNC: 368 U/L — HIGH (ref 50–242)
MCHC RBC-ENTMCNC: 28.6 PG — SIGNIFICANT CHANGE UP (ref 27–34)
MCHC RBC-ENTMCNC: 33 GM/DL — SIGNIFICANT CHANGE UP (ref 32–36)
MCV RBC AUTO: 86.7 FL — SIGNIFICANT CHANGE UP (ref 80–100)
NRBC # BLD: 0 /100 WBCS — SIGNIFICANT CHANGE UP (ref 0–0)
PLATELET # BLD AUTO: 343 K/UL — SIGNIFICANT CHANGE UP (ref 150–400)
POTASSIUM SERPL-MCNC: 4.8 MMOL/L — SIGNIFICANT CHANGE UP (ref 3.5–5.3)
POTASSIUM SERPL-SCNC: 4.8 MMOL/L — SIGNIFICANT CHANGE UP (ref 3.5–5.3)
PROCALCITONIN SERPL-MCNC: 0.03 NG/ML — SIGNIFICANT CHANGE UP (ref 0.02–0.1)
PROT SERPL-MCNC: 8.2 G/DL — SIGNIFICANT CHANGE UP (ref 6–8.3)
PROTHROM AB SERPL-ACNC: 39.6 SEC — HIGH (ref 10–12.9)
RBC # BLD: 4.65 M/UL — SIGNIFICANT CHANGE UP (ref 4.2–5.8)
RBC # FLD: 13.4 % — SIGNIFICANT CHANGE UP (ref 10.3–14.5)
SODIUM SERPL-SCNC: 136 MMOL/L — SIGNIFICANT CHANGE UP (ref 135–145)
TROPONIN T, HIGH SENSITIVITY RESULT: 17 NG/L — SIGNIFICANT CHANGE UP (ref 0–51)
WBC # BLD: 5.19 K/UL — SIGNIFICANT CHANGE UP (ref 3.8–10.5)
WBC # FLD AUTO: 5.19 K/UL — SIGNIFICANT CHANGE UP (ref 3.8–10.5)

## 2020-04-11 PROCEDURE — 99232 SBSQ HOSP IP/OBS MODERATE 35: CPT

## 2020-04-11 PROCEDURE — 93750 INTERROGATION VAD IN PERSON: CPT

## 2020-04-11 PROCEDURE — 99233 SBSQ HOSP IP/OBS HIGH 50: CPT | Mod: 25,GC

## 2020-04-11 RX ORDER — FUROSEMIDE 40 MG
40 TABLET ORAL ONCE
Refills: 0 | Status: COMPLETED | OUTPATIENT
Start: 2020-04-11 | End: 2020-04-11

## 2020-04-11 RX ADMIN — SENNA PLUS 2 TABLET(S): 8.6 TABLET ORAL at 21:35

## 2020-04-11 RX ADMIN — CHLORHEXIDINE GLUCONATE 1 APPLICATION(S): 213 SOLUTION TOPICAL at 07:39

## 2020-04-11 RX ADMIN — SODIUM CHLORIDE 3 MILLILITER(S): 9 INJECTION INTRAMUSCULAR; INTRAVENOUS; SUBCUTANEOUS at 21:36

## 2020-04-11 RX ADMIN — Medication 100 MILLIGRAM(S): at 16:33

## 2020-04-11 RX ADMIN — SODIUM CHLORIDE 3 MILLILITER(S): 9 INJECTION INTRAMUSCULAR; INTRAVENOUS; SUBCUTANEOUS at 06:33

## 2020-04-11 RX ADMIN — MIRTAZAPINE 7.5 MILLIGRAM(S): 45 TABLET, ORALLY DISINTEGRATING ORAL at 21:35

## 2020-04-11 RX ADMIN — FINASTERIDE 5 MILLIGRAM(S): 5 TABLET, FILM COATED ORAL at 11:41

## 2020-04-11 RX ADMIN — FAMOTIDINE 20 MILLIGRAM(S): 10 INJECTION INTRAVENOUS at 11:41

## 2020-04-11 RX ADMIN — Medication 81 MILLIGRAM(S): at 11:41

## 2020-04-11 RX ADMIN — Medication 40 MILLIGRAM(S): at 14:17

## 2020-04-11 RX ADMIN — Medication 650 MILLIGRAM(S): at 11:41

## 2020-04-11 RX ADMIN — TAMSULOSIN HYDROCHLORIDE 0.4 MILLIGRAM(S): 0.4 CAPSULE ORAL at 21:36

## 2020-04-11 RX ADMIN — AMIODARONE HYDROCHLORIDE 400 MILLIGRAM(S): 400 TABLET ORAL at 06:32

## 2020-04-11 RX ADMIN — SODIUM CHLORIDE 3 MILLILITER(S): 9 INJECTION INTRAMUSCULAR; INTRAVENOUS; SUBCUTANEOUS at 11:41

## 2020-04-11 NOTE — PROGRESS NOTE ADULT - PROBLEM SELECTOR PLAN 2
- consider re-starting diuretic regimen; will discuss w/ Dr. Iyer   - consider resuming oral vasodilator at some point if cont to remain stable - give IV lasix 40 mg x 1 dose; re-assess tomorrow   - consider resuming oral vasodilator at some point if cont to remain stable

## 2020-04-11 NOTE — PROGRESS NOTE ADULT - SUBJECTIVE AND OBJECTIVE BOX
- endorsing dyspnea when lying flat as well as abd pain  - otherwise no new/acute complaints         Medications:  acetaminophen   Tablet .. 650 milliGRAM(s) Oral every 6 hours PRN  aMIOdarone    Tablet   Oral   aMIOdarone    Tablet 200 milliGRAM(s) Oral daily  aspirin enteric coated 81 milliGRAM(s) Oral daily  chlorhexidine 4% Liquid 1 Application(s) Topical <User Schedule>  famotidine    Tablet 20 milliGRAM(s) Oral daily  finasteride 5 milliGRAM(s) Oral daily  mirtazapine 7.5 milliGRAM(s) Oral at bedtime  polyethylene glycol 3350 17 Gram(s) Oral daily PRN  senna 2 Tablet(s) Oral at bedtime  sodium chloride 0.9% lock flush 3 milliLiter(s) IV Push every 8 hours  tamsulosin 0.4 milliGRAM(s) Oral at bedtime      PAST MEDICAL & SURGICAL HISTORY:  Pleural effusion  Depression  CAD (coronary artery disease)  CHF (congestive heart failure)  S/P ventricular assist device  S/P TVR (tricuspid valve repair)        Vitals:  T(F): 97.8 (04-11), Max: 98 (04-10)  HR: 101 (04-11) (99 - 118)  BP: --  RR: 18 (04-11)  SpO2: 97% (04-11)  I&O's Summary    10 Apr 2020 07:01  -  11 Apr 2020 07:00  --------------------------------------------------------  IN: 340 mL / OUT: 425 mL / NET: -85 mL    11 Apr 2020 07:01  -  11 Apr 2020 11:24  --------------------------------------------------------  IN: 0 mL / OUT: 200 mL / NET: -200 mL        Physical Exam:  Appearance: No acute distress; well appearing  Eyes: PERRL, EOMI, pink conjunctiva  HENT: Normal oral mucosa  Cardiovascular: RRR, S1, S2, no murmurs, rubs, or gallops; mod-severely elevated JVP  Respiratory: Clear to auscultation bilaterally  Gastrointestinal: soft, non-tender, non-distended with normal bowel sounds  Musculoskeletal: no BLE edema   Neurologic: Non-focal  Lymphatic: No lymphadenopathy  Psychiatry: AAOx3, mood & affect appropriate  Skin: No rashes, ecchymoses, or cyanosis                          13.3   5.19  )-----------( 343      ( 11 Apr 2020 06:51 )             40.3     04-11    136  |  100  |  35<H>  ----------------------------<  99  4.8   |  20<L>  |  1.10    Ca    9.4      11 Apr 2020 06:56    TPro  8.2  /  Alb  3.7  /  TBili  0.4  /  DBili  x   /  AST  23  /  ALT  11  /  AlkPhos  67  04-11    PT/INR - ( 11 Apr 2020 06:56 )   PT: 39.6 sec;   INR: 3.32 ratio         PTT - ( 10 Apr 2020 06:32 )  PTT:39.0 sec    LVAD Interrogation:  Pump Flow:  4  Pump Speed: 8800  Pulse Index: 4  Pump Power: 4.5  VAD Events:  PI event this morning (2.6 w/ no speed drop)

## 2020-04-11 NOTE — PROGRESS NOTE ADULT - ASSESSMENT
ASSESSMENT:  64/M with PMH CHF s/p LVAD Sept 2017 (on Warfarin), CAD, s/p TVR, SIADH, Depression, CKD-3, past E. coli UTIs  P/w generalized weakness since 4 days; chills since 3 days; non-specific abdominal pain and constipation since 2 days and loose stools today  H/o sick contacts with Covid-19 at home. Imaging concerning for new opacities in RUL, Covid-19 positive  =========  LVAD with Covid-19 infection  - Covid-19 Labs - ; Procalcitonin 0.45 elevated ferritin and CRP  - completed 5 day course of PO Hydroxychloroquine  - repeat CXR showed new Rt lung opacity. Pt afebrile, on RA, with no acute symptoms of cough. Would monitor off antibiotics for now- likely related to COVID pneumonia    RECOMMENDATIONS:  - Continue supplemental O2 and supportive care per primary team  - completed course of plaquenil and oxygenation is stable on RA  - Continue Contact and Airborne Isolation precautions  - discharge planning    Morris Prater MD  [phone]  After 5pm/weekends [phone]

## 2020-04-11 NOTE — PROGRESS NOTE ADULT - PROBLEM SELECTOR PLAN 1
COVID Isolation/ Droplet precautions  ID following  Completed Plaquenil taper  Daily CBC w/ diff, CMP & CRP  Continue to monitor O2 sats  Discharge planning: home w/ PT when stable - possibly over weekend/Monday

## 2020-04-11 NOTE — PROGRESS NOTE ADULT - SUBJECTIVE AND OBJECTIVE BOX
VITAL SIGNS    Telemetry:      Vital Signs Last 24 Hrs  T(C): 36.6 (20 @ 10:00), Max: 36.6 (04-10-20 @ 18:00)  T(F): 97.8 (20 @ 10:00), Max: 97.8 (04-10-20 @ 18:00)  HR: 101 (20 @ 10:00) (99 - 105)  BP: --  RR: 18 (20 @ 10:00) (18 - 18)  SpO2: 97% (20 @ 10:00) (95% - 97%)           04-10 @ 07:01  -   @ 07:00  --------------------------------------------------------  IN: 340 mL / OUT: 425 mL / NET: -85 mL     @ 07:01  -   @ 14:07  --------------------------------------------------------  IN: 0 mL / OUT: 200 mL / NET: -200 mL    Daily     Daily Weight in k.9 (2020 13:12)    acetaminophen Tablet .. 650 milliGRAM(s) Oral every 6 hours PRN  aMIOdarone Tablet   Oral   aMIOdarone Tablet 200 milliGRAM(s) Oral daily  aspirin enteric coated 81 milliGRAM(s) Oral daily  chlorhexidine 4% Liquid 1 Application(s) Topical <User Schedule>  famotidine Tablet 20 milliGRAM(s) Oral daily  finasteride 5 milliGRAM(s) Oral daily  furosemide Injectable 40 milliGRAM(s) IV Push once  mirtazapine 7.5 milliGRAM(s) Oral at bedtime  polyethylene glycol 3350 17 Gram(s) Oral daily PRN  senna 2 Tablet(s) Oral at bedtime  sodium chloride 0.9% lock flush 3 milliLiter(s) IV Push every 8 hours  tamsulosin 0.4 milliGRAM(s) Oral at bedtime    Physical Therapy Rec:   Home  [ X ]   Home w/ PT  [  ]  Rehab  [  ]    Discussed with Cardiothoracic Team at AM rounds.

## 2020-04-11 NOTE — PROGRESS NOTE ADULT - ASSESSMENT
65 y/o male PMH ACC/AHA stage D HF due to NICM HM2 LVAD, TV annuloplasty ring 9/12/17 as destination therapy due to severe peripheral artery disease with significant stenosis, SIADH, Depression, CKD-3, with hyperkalemia past E. coli UTIs. Today he reports generalized weakness since 4 days, chills since 3 days, non-specific abdominal pain and constipation since 2 days with loose stools today.  Reports +Covid-19 sick contacts at home. Denies cough, difficulty breathing, chest pain, palpitations. Directed to [facility] ED r/o COVID. Seen by ID -BCx & urine Cx sent, COVID 19 PCR positive- started on Plaquenil, cefepime and flagyl. Renal function improved with volume resuscitation, home BP medications currently on hold.   4/2: VSS, Per ID d/c cefepime and flagyl today. Continue Plaquenil taper. INR 4.27 holding Coumadin tonight, Of note overnight patient had two febrile episodes, Tylenol was given. MAPs went down to 30, IV fluid was given and MAPs improved.   4/3 temp overnight max 101.5- Tylenol prn; wbc 3; CRP 5.6 today; continue to monitor closely on Plaquenil.  MAP's improved today 70's- IV fluid 500 cc normal saline as per Dr. Iyer   continue to monitor O2 sats: 98% RA ; anticoagulation with coumadin INR 3.08- coumadin 2 mg this evening  4/4 VSS ; tmax 100.9 overnight;  MAP 80's; resume bph meds today and d/c aleman sun/ mon  anticoagulation with coumadin INR 2.0- coumadin 2.5 mg this evening;  today, CRP 10.1- Plaquenil taper as per ID, continue to monitor closely   4/5 VSS, complete Plaquenil taper tonight, RA O2 Sat 96%, INR 2.76, Coumadin 2mg per HF. Aleman d/c'd today. Possible d/c home Monday if stable.   4/6 VSS, pt voiding. MAPs>90, resumed back on lisinopril 10 daily, also 500ml NS bolus per HF. INR 4.12, Coumadin on hold tonight.   4/7: VSS, MAPs 68-88. Increased Lisinopril to 10 mg daily, gave 50cc NS bolus and started fluids at 75cc/hr for total of 500cc per HF. Coumadin on hold tonight for INR 4.88. Ensure started to supplement PO intake per nutrition recs.   4/8 INR 4.12 held coumadin ,, dc lisinopril for elevated creatinine  MAPS  60  4/9 MAP 72 amio load for VT, CRP 8.22, creat down to 1.34  4/10: VSS, afebrile overnight, O2 sat 95% on RA, MAPs 72-74, negative 720cc fluid balance. CRP trending down, 5.19 today. . INR 3.02 --> Coumadin held. K 5.7 this AM --> repeat K 5.2. Aleman removed today --> TOV   4/11 VVS; CHF following --> Lasix 40 IV x 1 for volume overload.  Supra-therapeutic INR 3.32 --> Continue to hold coumadin. CRP trending down   Discharge planning: home w/ PT when stable - possibly over weekend/Monday

## 2020-04-11 NOTE — PROGRESS NOTE ADULT - SUBJECTIVE AND OBJECTIVE BOX
INFECTIOUS DISEASES FOLLOW UP-- Anitra Prater  [phone]    This is a follow up note for this  64yMale with  Shortness of breath  COVID-19+      ROS:  CONSTITUTIONAL:  No fever, good appetite  CARDIOVASCULAR:  No chest pain or palpitations  RESPIRATORY:  No dyspnea  GASTROINTESTINAL:  No nausea, vomiting, diarrhea, or abdominal pain  GENITOURINARY:  No dysuria  NEUROLOGIC:  No headache,     Allergies    No Known Allergies    Intolerances        ANTIBIOTICS/RELEVANT:  antimicrobials    immunologic:    OTHER:  acetaminophen   Tablet .. 650 milliGRAM(s) Oral every 6 hours PRN  aMIOdarone    Tablet   Oral   aMIOdarone    Tablet 200 milliGRAM(s) Oral daily  aspirin enteric coated 81 milliGRAM(s) Oral daily  chlorhexidine 4% Liquid 1 Application(s) Topical <User Schedule>  famotidine    Tablet 20 milliGRAM(s) Oral daily  finasteride 5 milliGRAM(s) Oral daily  mirtazapine 7.5 milliGRAM(s) Oral at bedtime  polyethylene glycol 3350 17 Gram(s) Oral daily PRN  senna 2 Tablet(s) Oral at bedtime  sodium chloride 0.9% lock flush 3 milliLiter(s) IV Push every 8 hours  tamsulosin 0.4 milliGRAM(s) Oral at bedtime      Objective:  Vital Signs Last 24 Hrs  T(C): 36.4 (11 Apr 2020 14:10), Max: 36.6 (10 Apr 2020 18:00)  T(F): 97.5 (11 Apr 2020 14:10), Max: 97.8 (10 Apr 2020 18:00)  HR: 98 (11 Apr 2020 14:10) (98 - 105)  BP: --  BP(mean): 76 (11 Apr 2020 14:10) (70 - 86)  RR: 18 (11 Apr 2020 14:10) (18 - 18)  SpO2: 93% (11 Apr 2020 14:10) (93% - 97%)    PHYSICAL EXAM:  Constitutional:no acute distress  Eyes:ALONSO, EOMI  Ear/Nose/Throat: no oral lesions, 	  Respiratory: clear BL  Cardiovascular: LVAD sounds  Gastrointestinal:soft, (+) BS, no tenderness  Extremities:no e/e/c  No Lymphadenopathy  IV sites not inflammed.    LABS:                        13.3   5.19  )-----------( 343      ( 11 Apr 2020 06:51 )             40.3     04-11    136  |  100  |  35<H>  ----------------------------<  99  4.8   |  20<L>  |  1.10    Ca    9.4      11 Apr 2020 06:56    TPro  8.2  /  Alb  3.7  /  TBili  0.4  /  DBili  x   /  AST  23  /  ALT  11  /  AlkPhos  67  04-11    PT/INR - ( 11 Apr 2020 06:56 )   PT: 39.6 sec;   INR: 3.32 ratio         PTT - ( 10 Apr 2020 06:32 )  PTT:39.0 sec      MICROBIOLOGY:            RECENT CULTURES:      RADIOLOGY & ADDITIONAL STUDIES:    < from: Xray Chest 1 View- PORTABLE-Routine (04.09.20 @ 05:02) >  FINDINGS/  IMPRESSION:    New opacity at the right base. The left lung is clear.    LVAD is again noted.    < end of copied text >

## 2020-04-11 NOTE — PROGRESS NOTE ADULT - PROBLEM SELECTOR PLAN 2
Heart Failure following  Continue ASA 81 daily   Lasix 40 mg IV x 1   Continue AC with Coumadin - hold tonight for INR 3.32  Continue to monitor daily labs CBC/CMP/INR/LDH

## 2020-04-11 NOTE — PROGRESS NOTE ADULT - ASSESSMENT
64 year old man with ACC/AHA stage D HF due to NICM, s/p HM2 LVAD  in 9/12/17 as destination therapy due to severe peripheral artery disease with significant stenosis. He currently presents with COVID-19 accompanied by abdominal pain, diarrhea and acute renal failure. He completed a 5 day course of Plaquenil on 4/5.    supratherapeutic INR and rest of labs cont to improve  Hypervolemic on exam w/ elevated filling pressures and occasional PI events.

## 2020-04-12 LAB
ALBUMIN SERPL ELPH-MCNC: 3.8 G/DL — SIGNIFICANT CHANGE UP (ref 3.3–5)
ALP SERPL-CCNC: 65 U/L — SIGNIFICANT CHANGE UP (ref 40–120)
ALT FLD-CCNC: 14 U/L — SIGNIFICANT CHANGE UP (ref 10–45)
ANION GAP SERPL CALC-SCNC: 16 MMOL/L — SIGNIFICANT CHANGE UP (ref 5–17)
AST SERPL-CCNC: 21 U/L — SIGNIFICANT CHANGE UP (ref 10–40)
BILIRUB SERPL-MCNC: 0.4 MG/DL — SIGNIFICANT CHANGE UP (ref 0.2–1.2)
BUN SERPL-MCNC: 39 MG/DL — HIGH (ref 7–23)
CALCIUM SERPL-MCNC: 9.7 MG/DL — SIGNIFICANT CHANGE UP (ref 8.4–10.5)
CHLORIDE SERPL-SCNC: 98 MMOL/L — SIGNIFICANT CHANGE UP (ref 96–108)
CO2 SERPL-SCNC: 18 MMOL/L — LOW (ref 22–31)
CREAT SERPL-MCNC: 1.24 MG/DL — SIGNIFICANT CHANGE UP (ref 0.5–1.3)
CRP SERPL-MCNC: 1.45 MG/DL — HIGH (ref 0–0.4)
GLUCOSE SERPL-MCNC: 93 MG/DL — SIGNIFICANT CHANGE UP (ref 70–99)
HCT VFR BLD CALC: 41.6 % — SIGNIFICANT CHANGE UP (ref 39–50)
HGB BLD-MCNC: 13.7 G/DL — SIGNIFICANT CHANGE UP (ref 13–17)
INR BLD: 2.08 RATIO — HIGH (ref 0.88–1.16)
LDH SERPL L TO P-CCNC: 370 U/L — HIGH (ref 50–242)
MCHC RBC-ENTMCNC: 28.8 PG — SIGNIFICANT CHANGE UP (ref 27–34)
MCHC RBC-ENTMCNC: 32.9 GM/DL — SIGNIFICANT CHANGE UP (ref 32–36)
MCV RBC AUTO: 87.4 FL — SIGNIFICANT CHANGE UP (ref 80–100)
NRBC # BLD: 0 /100 WBCS — SIGNIFICANT CHANGE UP (ref 0–0)
PLATELET # BLD AUTO: 356 K/UL — SIGNIFICANT CHANGE UP (ref 150–400)
POTASSIUM SERPL-MCNC: 5 MMOL/L — SIGNIFICANT CHANGE UP (ref 3.5–5.3)
POTASSIUM SERPL-SCNC: 5 MMOL/L — SIGNIFICANT CHANGE UP (ref 3.5–5.3)
PROT SERPL-MCNC: 8.6 G/DL — HIGH (ref 6–8.3)
PROTHROM AB SERPL-ACNC: 24.3 SEC — HIGH (ref 10–12.9)
RBC # BLD: 4.76 M/UL — SIGNIFICANT CHANGE UP (ref 4.2–5.8)
RBC # FLD: 13.2 % — SIGNIFICANT CHANGE UP (ref 10.3–14.5)
SODIUM SERPL-SCNC: 132 MMOL/L — LOW (ref 135–145)
WBC # BLD: 5.15 K/UL — SIGNIFICANT CHANGE UP (ref 3.8–10.5)
WBC # FLD AUTO: 5.15 K/UL — SIGNIFICANT CHANGE UP (ref 3.8–10.5)

## 2020-04-12 PROCEDURE — 93750 INTERROGATION VAD IN PERSON: CPT

## 2020-04-12 PROCEDURE — 99231 SBSQ HOSP IP/OBS SF/LOW 25: CPT

## 2020-04-12 PROCEDURE — 99233 SBSQ HOSP IP/OBS HIGH 50: CPT | Mod: 25,GC

## 2020-04-12 RX ORDER — WARFARIN SODIUM 2.5 MG/1
1 TABLET ORAL ONCE
Refills: 0 | Status: COMPLETED | OUTPATIENT
Start: 2020-04-12 | End: 2020-04-12

## 2020-04-12 RX ADMIN — AMIODARONE HYDROCHLORIDE 200 MILLIGRAM(S): 400 TABLET ORAL at 04:51

## 2020-04-12 RX ADMIN — Medication 81 MILLIGRAM(S): at 13:12

## 2020-04-12 RX ADMIN — WARFARIN SODIUM 1 MILLIGRAM(S): 2.5 TABLET ORAL at 22:13

## 2020-04-12 RX ADMIN — SODIUM CHLORIDE 3 MILLILITER(S): 9 INJECTION INTRAMUSCULAR; INTRAVENOUS; SUBCUTANEOUS at 21:58

## 2020-04-12 RX ADMIN — SODIUM CHLORIDE 3 MILLILITER(S): 9 INJECTION INTRAMUSCULAR; INTRAVENOUS; SUBCUTANEOUS at 04:51

## 2020-04-12 RX ADMIN — SODIUM CHLORIDE 3 MILLILITER(S): 9 INJECTION INTRAMUSCULAR; INTRAVENOUS; SUBCUTANEOUS at 13:13

## 2020-04-12 RX ADMIN — TAMSULOSIN HYDROCHLORIDE 0.4 MILLIGRAM(S): 0.4 CAPSULE ORAL at 22:13

## 2020-04-12 RX ADMIN — CHLORHEXIDINE GLUCONATE 1 APPLICATION(S): 213 SOLUTION TOPICAL at 04:51

## 2020-04-12 RX ADMIN — FINASTERIDE 5 MILLIGRAM(S): 5 TABLET, FILM COATED ORAL at 13:13

## 2020-04-12 RX ADMIN — SENNA PLUS 2 TABLET(S): 8.6 TABLET ORAL at 22:13

## 2020-04-12 RX ADMIN — FAMOTIDINE 20 MILLIGRAM(S): 10 INJECTION INTRAVENOUS at 13:12

## 2020-04-12 RX ADMIN — MIRTAZAPINE 7.5 MILLIGRAM(S): 45 TABLET, ORALLY DISINTEGRATING ORAL at 22:12

## 2020-04-12 NOTE — PROGRESS NOTE ADULT - SUBJECTIVE AND OBJECTIVE BOX
VITAL SIGNS    Telemetry:       sr  100  Vital Signs Last 24 Hrs  T(C): 36.4 (20 @ 06:09), Max: 36.7 (20 @ 21:54)  T(F): 97.5 (20 @ 06:09), Max: 98.1 (20 @ 02:33)  HR: 103 (20 @ 06:09) (82 - 103)  BP: --  RR: 18 (20 @ 06:09) (18 - 18)  SpO2: 97% (20 @ 06:09) (92% - 98%)                   Daily     Daily Weight in k.9 (2020 13:12)      Bilirubin Total, Serum: 0.4 mg/dL ( @ 07:07)    CAPILLARY BLOOD GLUCOSE            Coumadin    [ ] YES

## 2020-04-12 NOTE — PROGRESS NOTE ADULT - PROBLEM SELECTOR PLAN 2
Heart Failure following  Continue ASA 81 daily     Continue AC with Coumadin - hold tonight for INR 3.32  Continue to monitor daily labs CBC/CMP/INR/LDH

## 2020-04-12 NOTE — PROGRESS NOTE ADULT - PROBLEM SELECTOR PLAN 2
- consider resuming oral vasodilator at some point if cont to remain stable - consider resuming oral vasodilator at some point if cont to remain stable. At home was on lisinopril 10 mg BID.

## 2020-04-12 NOTE — PROGRESS NOTE ADULT - ATTENDING COMMENTS
Gradually improving. Possible discharge tomorrow.     Please call me with questions at [phone].

## 2020-04-12 NOTE — PROGRESS NOTE ADULT - ASSESSMENT
63 y/o male PMH ACC/AHA stage D HF due to NICM HM2 LVAD, TV annuloplasty ring 9/12/17 as destination therapy due to severe peripheral artery disease with significant stenosis, SIADH, Depression, CKD-3, with hyperkalemia past E. coli UTIs. Today he reports generalized weakness since 4 days, chills since 3 days, non-specific abdominal pain and constipation since 2 days with loose stools today.  Reports +Covid-19 sick contacts at home. Denies cough, difficulty breathing, chest pain, palpitations. Directed to [facility] ED r/o COVID. Seen by ID -BCx & urine Cx sent, COVID 19 PCR positive- started on Plaquenil, cefepime and flagyl. Renal function improved with volume resuscitation, home BP medications currently on hold.   4/2: VSS, Per ID d/c cefepime and flagyl today. Continue Plaquenil taper. INR 4.27 holding Coumadin tonight, Of note overnight patient had two febrile episodes, Tylenol was given. MAPs went down to 30, IV fluid was given and MAPs improved.   4/3 temp overnight max 101.5- Tylenol prn; wbc 3; CRP 5.6 today; continue to monitor closely on Plaquenil.  MAP's improved today 70's- IV fluid 500 cc normal saline as per Dr. Iyer   continue to monitor O2 sats: 98% RA ; anticoagulation with coumadin INR 3.08- coumadin 2 mg this evening  4/4 VSS ; tmax 100.9 overnight;  MAP 80's; resume bph meds today and d/c aleman sun/ mon  anticoagulation with coumadin INR 2.0- coumadin 2.5 mg this evening;  today, CRP 10.1- Plaquenil taper as per ID, continue to monitor closely   4/5 VSS, complete Plaquenil taper tonight, RA O2 Sat 96%, INR 2.76, Coumadin 2mg per HF. Aleman d/c'd today. Possible d/c home Monday if stable.   4/6 VSS, pt voiding. MAPs>90, resumed back on lisinopril 10 daily, also 500ml NS bolus per HF. INR 4.12, Coumadin on hold tonight.   4/7: VSS, MAPs 68-88. Increased Lisinopril to 10 mg daily, gave 50cc NS bolus and started fluids at 75cc/hr for total of 500cc per HF. Coumadin on hold tonight for INR 4.88. Ensure started to supplement PO intake per nutrition recs.   4/8 INR 4.12 held coumadin ,, dc lisinopril for elevated creatinine  MAPS  60  4/9 MAP 72 amio load for VT, CRP 8.22, creat down to 1.34  4/10: VSS, afebrile overnight, O2 sat 95% on RA, MAPs 72-74, negative 720cc fluid balance. CRP trending down, 5.19 today. . INR 3.02 --> Coumadin held. K 5.7 this AM --> repeat K 5.2. Aleman removed today --> TOV   4/11 VVS; CHF following --> Lasix 40 IV x 1 for volume overload.  Supra-therapeutic INR 3.32 --> Continue to hold coumadin. CRP trending down   4/12   SR  100   INR  2.08   dosed coumadin 1 mg,  02 ssat 92 percent   VSS   Labs stable , on [id number]

## 2020-04-12 NOTE — PROGRESS NOTE ADULT - SUBJECTIVE AND OBJECTIVE BOX
- no acute overnight events         Medications:  acetaminophen   Tablet .. 650 milliGRAM(s) Oral every 6 hours PRN  aMIOdarone    Tablet   Oral   aMIOdarone    Tablet 200 milliGRAM(s) Oral daily  aspirin enteric coated 81 milliGRAM(s) Oral daily  chlorhexidine 4% Liquid 1 Application(s) Topical <User Schedule>  famotidine    Tablet 20 milliGRAM(s) Oral daily  finasteride 5 milliGRAM(s) Oral daily  guaiFENesin   Syrup  (Sugar-Free) 100 milliGRAM(s) Oral every 6 hours PRN  mirtazapine 7.5 milliGRAM(s) Oral at bedtime  polyethylene glycol 3350 17 Gram(s) Oral daily PRN  senna 2 Tablet(s) Oral at bedtime  sodium chloride 0.9% lock flush 3 milliLiter(s) IV Push every 8 hours  tamsulosin 0.4 milliGRAM(s) Oral at bedtime  warfarin 1 milliGRAM(s) Oral once      PAST MEDICAL & SURGICAL HISTORY:  Pleural effusion  Depression  CAD (coronary artery disease)  CHF (congestive heart failure)  S/P ventricular assist device  S/P TVR (tricuspid valve repair)        Vitals:  T(F): 97.5 (04-12), Max: 98.1 (04-12)  HR: 103 (04-12) (82 - 103)  BP: --  RR: 18 (04-12)  SpO2: 97% (04-12)  I&O's Summary    11 Apr 2020 07:01  -  12 Apr 2020 07:00  --------------------------------------------------------  IN: 200 mL / OUT: 550 mL / NET: -350 mL        Physical Exam:  Appearance: No acute distress; well appearing  Eyes: PERRL, EOMI, pink conjunctiva  HENT: Normal oral mucosa  Cardiovascular: LVAD Hum; no edema; normal JVP  Respiratory: Clear to auscultation bilaterally  Gastrointestinal: soft, non-tender, non-distended with normal bowel sounds  Musculoskeletal: No clubbing; no joint deformity   Neurologic: Non-focal  Lymphatic: No lymphadenopathy  Psychiatry: AAOx3, mood & affect appropriate  Skin: No rashes, ecchymoses, or cyanosis                          13.7   5.15  )-----------( 356      ( 12 Apr 2020 07:07 )             41.6     04-12    132<L>  |  98  |  39<H>  ----------------------------<  93  5.0   |  18<L>  |  1.24    Ca    9.7      12 Apr 2020 07:07    TPro  8.6<H>  /  Alb  3.8  /  TBili  0.4  /  DBili  x   /  AST  21  /  ALT  14  /  AlkPhos  65  04-12    PT/INR - ( 12 Apr 2020 07:07 )   PT: 24.3 sec;   INR: 2.08 ratio      Interpretation of Telemetry:  sinus rhythm, HR 90s    LVAD Interrogation:  Pump Flow:  4.1  Pump Speed: 8800  Pulse Index: 5.4  Pump Power: 4.5  VAD Events:  no events

## 2020-04-12 NOTE — PROGRESS NOTE ADULT - PROBLEM SELECTOR PLAN 1
COVID Isolation/ Droplet precautions  ID following  Completed Plaquenil taper  Daily CBC w/ diff, CMP & CRP  Continue to monitor O2 sats  Discharge planning: home w/ PT when stable - Monday

## 2020-04-12 NOTE — PROGRESS NOTE ADULT - ASSESSMENT
64 year old man with ACC/AHA stage D HF due to NICM, s/p HM2 LVAD  in 9/12/17 as destination therapy due to severe peripheral artery disease with significant stenosis. He currently presents with COVID-19 accompanied by abdominal pain, diarrhea and acute renal failure. He completed a 5 day course of Plaquenil on 4/5.    supratherapeutic INR and rest of labs cont to improve  Currently euvolemic on exam.  Responded well to IV diuretic dose.

## 2020-04-13 DIAGNOSIS — K59.00 CONSTIPATION, UNSPECIFIED: ICD-10-CM

## 2020-04-13 LAB
ALBUMIN SERPL ELPH-MCNC: 4 G/DL — SIGNIFICANT CHANGE UP (ref 3.3–5)
ALP SERPL-CCNC: 66 U/L — SIGNIFICANT CHANGE UP (ref 40–120)
ALT FLD-CCNC: 14 U/L — SIGNIFICANT CHANGE UP (ref 10–45)
ANION GAP SERPL CALC-SCNC: 14 MMOL/L — SIGNIFICANT CHANGE UP (ref 5–17)
APTT BLD: 28.7 SEC — SIGNIFICANT CHANGE UP (ref 27.5–36.3)
AST SERPL-CCNC: 23 U/L — SIGNIFICANT CHANGE UP (ref 10–40)
BASOPHILS # BLD AUTO: 0.03 K/UL — SIGNIFICANT CHANGE UP (ref 0–0.2)
BASOPHILS NFR BLD AUTO: 0.6 % — SIGNIFICANT CHANGE UP (ref 0–2)
BILIRUB SERPL-MCNC: 0.3 MG/DL — SIGNIFICANT CHANGE UP (ref 0.2–1.2)
BUN SERPL-MCNC: 36 MG/DL — HIGH (ref 7–23)
CALCIUM SERPL-MCNC: 9.5 MG/DL — SIGNIFICANT CHANGE UP (ref 8.4–10.5)
CHLORIDE SERPL-SCNC: 97 MMOL/L — SIGNIFICANT CHANGE UP (ref 96–108)
CO2 SERPL-SCNC: 20 MMOL/L — LOW (ref 22–31)
CREAT SERPL-MCNC: 1.18 MG/DL — SIGNIFICANT CHANGE UP (ref 0.5–1.3)
CRP SERPL-MCNC: 0.9 MG/DL — HIGH (ref 0–0.4)
EOSINOPHIL # BLD AUTO: 0.21 K/UL — SIGNIFICANT CHANGE UP (ref 0–0.5)
EOSINOPHIL NFR BLD AUTO: 4.4 % — SIGNIFICANT CHANGE UP (ref 0–6)
G6PD RBC-CCNC: 15.6 U/G HGB — SIGNIFICANT CHANGE UP (ref 7–20.5)
GLUCOSE SERPL-MCNC: 125 MG/DL — HIGH (ref 70–99)
HCT VFR BLD CALC: 40.4 % — SIGNIFICANT CHANGE UP (ref 39–50)
HGB BLD-MCNC: 13.7 G/DL — SIGNIFICANT CHANGE UP (ref 13–17)
IMM GRANULOCYTES NFR BLD AUTO: 1.7 % — HIGH (ref 0–1.5)
INR BLD: 1.4 RATIO — HIGH (ref 0.88–1.16)
LDH SERPL L TO P-CCNC: 330 U/L — HIGH (ref 50–242)
LYMPHOCYTES # BLD AUTO: 1.13 K/UL — SIGNIFICANT CHANGE UP (ref 1–3.3)
LYMPHOCYTES # BLD AUTO: 23.7 % — SIGNIFICANT CHANGE UP (ref 13–44)
MCHC RBC-ENTMCNC: 28.8 PG — SIGNIFICANT CHANGE UP (ref 27–34)
MCHC RBC-ENTMCNC: 33.9 GM/DL — SIGNIFICANT CHANGE UP (ref 32–36)
MCV RBC AUTO: 84.9 FL — SIGNIFICANT CHANGE UP (ref 80–100)
MONOCYTES # BLD AUTO: 0.55 K/UL — SIGNIFICANT CHANGE UP (ref 0–0.9)
MONOCYTES NFR BLD AUTO: 11.6 % — SIGNIFICANT CHANGE UP (ref 2–14)
NEUTROPHILS # BLD AUTO: 2.76 K/UL — SIGNIFICANT CHANGE UP (ref 1.8–7.4)
NEUTROPHILS NFR BLD AUTO: 58 % — SIGNIFICANT CHANGE UP (ref 43–77)
NRBC # BLD: 0 /100 WBCS — SIGNIFICANT CHANGE UP (ref 0–0)
PLATELET # BLD AUTO: 376 K/UL — SIGNIFICANT CHANGE UP (ref 150–400)
POTASSIUM SERPL-MCNC: 4.5 MMOL/L — SIGNIFICANT CHANGE UP (ref 3.5–5.3)
POTASSIUM SERPL-SCNC: 4.5 MMOL/L — SIGNIFICANT CHANGE UP (ref 3.5–5.3)
PROT SERPL-MCNC: 8.5 G/DL — HIGH (ref 6–8.3)
PROTHROM AB SERPL-ACNC: 16.3 SEC — HIGH (ref 10–12.9)
RBC # BLD: 4.76 M/UL — SIGNIFICANT CHANGE UP (ref 4.2–5.8)
RBC # FLD: 13.1 % — SIGNIFICANT CHANGE UP (ref 10.3–14.5)
SODIUM SERPL-SCNC: 131 MMOL/L — LOW (ref 135–145)
WBC # BLD: 4.76 K/UL — SIGNIFICANT CHANGE UP (ref 3.8–10.5)
WBC # FLD AUTO: 4.76 K/UL — SIGNIFICANT CHANGE UP (ref 3.8–10.5)

## 2020-04-13 PROCEDURE — 99232 SBSQ HOSP IP/OBS MODERATE 35: CPT

## 2020-04-13 PROCEDURE — 99233 SBSQ HOSP IP/OBS HIGH 50: CPT | Mod: 25

## 2020-04-13 PROCEDURE — 93750 INTERROGATION VAD IN PERSON: CPT

## 2020-04-13 RX ORDER — METOPROLOL TARTRATE 50 MG
25 TABLET ORAL DAILY
Refills: 0 | Status: DISCONTINUED | OUTPATIENT
Start: 2020-04-13 | End: 2020-04-20

## 2020-04-13 RX ORDER — POLYETHYLENE GLYCOL 3350 17 G/17G
17 POWDER, FOR SOLUTION ORAL DAILY
Refills: 0 | Status: DISCONTINUED | OUTPATIENT
Start: 2020-04-13 | End: 2020-04-20

## 2020-04-13 RX ORDER — SORBITOL SOLUTION 70 %
15 SOLUTION, ORAL MISCELLANEOUS ONCE
Refills: 0 | Status: COMPLETED | OUTPATIENT
Start: 2020-04-13 | End: 2020-04-13

## 2020-04-13 RX ORDER — ENOXAPARIN SODIUM 100 MG/ML
50 INJECTION SUBCUTANEOUS EVERY 12 HOURS
Refills: 0 | Status: DISCONTINUED | OUTPATIENT
Start: 2020-04-13 | End: 2020-04-15

## 2020-04-13 RX ORDER — WARFARIN SODIUM 2.5 MG/1
2 TABLET ORAL ONCE
Refills: 0 | Status: COMPLETED | OUTPATIENT
Start: 2020-04-13 | End: 2020-04-13

## 2020-04-13 RX ORDER — FUROSEMIDE 40 MG
20 TABLET ORAL DAILY
Refills: 0 | Status: DISCONTINUED | OUTPATIENT
Start: 2020-04-13 | End: 2020-04-14

## 2020-04-13 RX ADMIN — Medication 20 MILLIGRAM(S): at 12:10

## 2020-04-13 RX ADMIN — SODIUM CHLORIDE 3 MILLILITER(S): 9 INJECTION INTRAMUSCULAR; INTRAVENOUS; SUBCUTANEOUS at 06:16

## 2020-04-13 RX ADMIN — TAMSULOSIN HYDROCHLORIDE 0.4 MILLIGRAM(S): 0.4 CAPSULE ORAL at 23:04

## 2020-04-13 RX ADMIN — FAMOTIDINE 20 MILLIGRAM(S): 10 INJECTION INTRAVENOUS at 10:07

## 2020-04-13 RX ADMIN — MIRTAZAPINE 7.5 MILLIGRAM(S): 45 TABLET, ORALLY DISINTEGRATING ORAL at 23:03

## 2020-04-13 RX ADMIN — ENOXAPARIN SODIUM 50 MILLIGRAM(S): 100 INJECTION SUBCUTANEOUS at 23:02

## 2020-04-13 RX ADMIN — POLYETHYLENE GLYCOL 3350 17 GRAM(S): 17 POWDER, FOR SOLUTION ORAL at 10:00

## 2020-04-13 RX ADMIN — WARFARIN SODIUM 2 MILLIGRAM(S): 2.5 TABLET ORAL at 23:04

## 2020-04-13 RX ADMIN — Medication 15 MILLILITER(S): at 18:24

## 2020-04-13 RX ADMIN — SODIUM CHLORIDE 3 MILLILITER(S): 9 INJECTION INTRAMUSCULAR; INTRAVENOUS; SUBCUTANEOUS at 10:08

## 2020-04-13 RX ADMIN — Medication 25 MILLIGRAM(S): at 12:10

## 2020-04-13 RX ADMIN — CHLORHEXIDINE GLUCONATE 1 APPLICATION(S): 213 SOLUTION TOPICAL at 08:24

## 2020-04-13 RX ADMIN — Medication 81 MILLIGRAM(S): at 10:07

## 2020-04-13 RX ADMIN — ENOXAPARIN SODIUM 50 MILLIGRAM(S): 100 INJECTION SUBCUTANEOUS at 12:10

## 2020-04-13 RX ADMIN — Medication 650 MILLIGRAM(S): at 10:13

## 2020-04-13 RX ADMIN — AMIODARONE HYDROCHLORIDE 200 MILLIGRAM(S): 400 TABLET ORAL at 06:17

## 2020-04-13 RX ADMIN — SENNA PLUS 2 TABLET(S): 8.6 TABLET ORAL at 23:03

## 2020-04-13 RX ADMIN — FINASTERIDE 5 MILLIGRAM(S): 5 TABLET, FILM COATED ORAL at 10:07

## 2020-04-13 RX ADMIN — SODIUM CHLORIDE 3 MILLILITER(S): 9 INJECTION INTRAMUSCULAR; INTRAVENOUS; SUBCUTANEOUS at 23:03

## 2020-04-13 NOTE — PROGRESS NOTE ADULT - ASSESSMENT
63 y/o male PMH ACC/AHA stage D HF due to NICM HM2 LVAD, TV annuloplasty ring 9/12/17 as destination therapy due to severe peripheral artery disease with significant stenosis, SIADH, Depression, CKD-3, with hyperkalemia past E. coli UTIs. Today he reports generalized weakness since 4 days, chills since 3 days, non-specific abdominal pain and constipation since 2 days with loose stools today.  Reports +Covid-19 sick contacts at home. Denies cough, difficulty breathing, chest pain, palpitations. Directed to [facility] ED r/o COVID. Seen by ID -BCx & urine Cx sent, COVID 19 PCR positive- started on Plaquenil, cefepime and flagyl. Renal function improved with volume resuscitation, home BP medications currently on hold.   4/2: VSS, Per ID d/c cefepime and flagyl today. Continue Plaquenil taper. INR 4.27 holding Coumadin tonight, Of note overnight patient had two febrile episodes, Tylenol was given. MAPs went down to 30, IV fluid was given and MAPs improved.   4/3 temp overnight max 101.5- Tylenol prn; wbc 3; CRP 5.6 today; continue to monitor closely on Plaquenil.  MAP's improved today 70's- IV fluid 500 cc normal saline as per Dr. Iyer   continue to monitor O2 sats: 98% RA ; anticoagulation with coumadin INR 3.08- coumadin 2 mg this evening  4/4 VSS ; tmax 100.9 overnight;  MAP 80's; resume bph meds today and d/c aleman sun/ mon  anticoagulation with coumadin INR 2.0- coumadin 2.5 mg this evening;  today, CRP 10.1- Plaquenil taper as per ID, continue to monitor closely   4/5 VSS, complete Plaquenil taper tonight, RA O2 Sat 96%, INR 2.76, Coumadin 2mg per HF. Aleman d/c'd today. Possible d/c home Monday if stable.   4/6 VSS, pt voiding. MAPs>90, resumed back on lisinopril 10 daily, also 500ml NS bolus per HF. INR 4.12, Coumadin on hold tonight.   4/7: VSS, MAPs 68-88. Increased Lisinopril to 10 mg daily, gave 50cc NS bolus and started fluids at 75cc/hr for total of 500cc per HF. Coumadin on hold tonight for INR 4.88. Ensure started to supplement PO intake per nutrition recs.   4/8 INR 4.12 held coumadin ,, dc lisinopril for elevated creatinine  MAPS  60  4/9 MAP 72 amio load for VT, CRP 8.22, creat down to 1.34  4/10: VSS, afebrile overnight, O2 sat 95% on RA, MAPs 72-74, negative 720cc fluid balance. CRP trending down, 5.19 today. . INR 3.02 --> Coumadin held. K 5.7 this AM --> repeat K 5.2. Aleman removed today --> TOV   4/11 VVS; CHF following --> Lasix 40 IV x 1 for volume overload.  Supra-therapeutic INR 3.32 --> Continue to hold coumadin. CRP trending down   4/12   SR  100   INR  2.08   dosed coumadin 1 mg,  02 ssat 92 percent   VSS   Labs stable , on [id number]  4/13: VSS, O2 sat 94-96% on RA, afebrile, MAPs 85-88. INR 1.4 --> Coumadin 2 mg tonight per heart failure team. Last BM 4/7 --> currently on Senna daily, will start Miralax daily - 1st dose stat and give Dulcolax suppository x 1. Per heart failure team, start Lovenox 50 mg SQ BID, Toprol XL 25 mg QD and Lasix 20 mg QD today. If pt tolerates Toprol XL will start Lisinopril 5 mg QD tomorrow.   Discharge planning: home w/ PT when stable - possibly tomorrow 4/14 63 y/o male PMH ACC/AHA stage D HF due to NICM HM2 LVAD, TV annuloplasty ring 9/12/17 as destination therapy due to severe peripheral artery disease with significant stenosis, SIADH, Depression, CKD-3, with hyperkalemia past E. coli UTIs. Today he reports generalized weakness since 4 days, chills since 3 days, non-specific abdominal pain and constipation since 2 days with loose stools today.  Reports +Covid-19 sick contacts at home. Denies cough, difficulty breathing, chest pain, palpitations. Directed to [facility] ED r/o COVID. Seen by ID -BCx & urine Cx sent, COVID 19 PCR positive- started on Plaquenil, cefepime and flagyl. Renal function improved with volume resuscitation, home BP medications currently on hold.   4/2: VSS, Per ID d/c cefepime and flagyl today. Continue Plaquenil taper. INR 4.27 holding Coumadin tonight, Of note overnight patient had two febrile episodes, Tylenol was given. MAPs went down to 30, IV fluid was given and MAPs improved.   4/3 temp overnight max 101.5- Tylenol prn; wbc 3; CRP 5.6 today; continue to monitor closely on Plaquenil.  MAP's improved today 70's- IV fluid 500 cc normal saline as per Dr. Iyer   continue to monitor O2 sats: 98% RA ; anticoagulation with coumadin INR 3.08- coumadin 2 mg this evening  4/4 VSS ; tmax 100.9 overnight;  MAP 80's; resume bph meds today and d/c aleman sun/ mon  anticoagulation with coumadin INR 2.0- coumadin 2.5 mg this evening;  today, CRP 10.1- Plaquenil taper as per ID, continue to monitor closely   4/5 VSS, complete Plaquenil taper tonight, RA O2 Sat 96%, INR 2.76, Coumadin 2mg per HF. Aleman d/c'd today. Possible d/c home Monday if stable.   4/6 VSS, pt voiding. MAPs>90, resumed back on lisinopril 10 daily, also 500ml NS bolus per HF. INR 4.12, Coumadin on hold tonight.   4/7: VSS, MAPs 68-88. Increased Lisinopril to 10 mg daily, gave 50cc NS bolus and started fluids at 75cc/hr for total of 500cc per HF. Coumadin on hold tonight for INR 4.88. Ensure started to supplement PO intake per nutrition recs.   4/8 INR 4.12 held coumadin ,, dc lisinopril for elevated creatinine  MAPS  60  4/9 MAP 72 amio load for VT, CRP 8.22, creat down to 1.34  4/10: VSS, afebrile overnight, O2 sat 95% on RA, MAPs 72-74, negative 720cc fluid balance. CRP trending down, 5.19 today. . INR 3.02 --> Coumadin held. K 5.7 this AM --> repeat K 5.2. Aleman removed today --> TOV   4/11 VVS; CHF following --> Lasix 40 IV x 1 for volume overload.  Supra-therapeutic INR 3.32 --> Continue to hold coumadin. CRP trending down   4/12   SR  100   INR  2.08   dosed coumadin 1 mg,  02 ssat 92 percent   VSS   Labs stable , on [id number]  4/13: VSS, O2 sat 94-96% on RA, afebrile, MAPs 85-88. INR 1.4 --> Coumadin 2 mg tonight per heart failure team. Last BM 4/7 --> currently on Senna daily, will start Miralax daily - 1st dose stat and give Dulcolax suppository x 1. Per heart failure team, start Lovenox 50 mg SQ BID, Toprol XL 25 mg QD and Lasix 20 mg QD today. If pt tolerates Toprol XL will start Lisinopril 5 mg QD tomorrow.   Discharge planning: home w/ PT when stable - possibly tomorrow 4/14 as per Heart failure

## 2020-04-13 NOTE — PROGRESS NOTE ADULT - ATTENDING COMMENTS
65 YO M with a history of ACC/AHA stage D HF due to NICM s/p HM2 LVAD on 9/12/17 as destination therapy (severe PAD) who was admitted with 4/2 with weakness, chills, and abdominal pain and found to have COVID-19 infection. He has never required supplemental O2 and completed a 5 day course of plaquinel. Course notable for VT 4/9 for which he was started on amiodarone and RASHAWN due to hypovolemia which has resolved. He is improving clinically and nearing discharge though INR is subtherapeutic.  - start metoprolol succinate 25 mg daily. If tolerates will start lisinopril 5 mg daily tomorrow.  - restart home furosemide 20 mg daily  - increase coumadin to 2 mg daily, goal INR 2-3. continue ASA.  - therapeutic lovenox in house for bridging (avoiding heparin to avoid increased patient encounters for PTT monitoring given +COVID) and per VAD team he would be poor candidate for outpatient bridging   - increase laxatives, last BM 4/7

## 2020-04-13 NOTE — PROGRESS NOTE ADULT - SUBJECTIVE AND OBJECTIVE BOX
INFECTIOUS DISEASES FOLLOW UP-- Anitra Prater  [phone]    This is a follow up note for this  64yMale with  Shortness of breath  COVID-19      ROS:  CONSTITUTIONAL:  No fever, good appetite  CARDIOVASCULAR:  No chest pain or palpitations  RESPIRATORY:  No dyspnea  GASTROINTESTINAL:  No nausea, vomiting, diarrhea, or abdominal pain  GENITOURINARY:  No dysuria  NEUROLOGIC:  No headache,     Allergies    No Known Allergies    Intolerances        ANTIBIOTICS/RELEVANT:  antimicrobials    immunologic:    OTHER:  acetaminophen   Tablet .. 650 milliGRAM(s) Oral every 6 hours PRN  aMIOdarone    Tablet   Oral   aMIOdarone    Tablet 200 milliGRAM(s) Oral daily  aspirin enteric coated 81 milliGRAM(s) Oral daily  chlorhexidine 4% Liquid 1 Application(s) Topical <User Schedule>  enoxaparin Injectable 50 milliGRAM(s) SubCutaneous every 12 hours  famotidine    Tablet 20 milliGRAM(s) Oral daily  finasteride 5 milliGRAM(s) Oral daily  furosemide    Tablet 20 milliGRAM(s) Oral daily  guaiFENesin   Syrup  (Sugar-Free) 100 milliGRAM(s) Oral every 6 hours PRN  metoprolol succinate ER 25 milliGRAM(s) Oral daily  mirtazapine 7.5 milliGRAM(s) Oral at bedtime  polyethylene glycol 3350 17 Gram(s) Oral daily  senna 2 Tablet(s) Oral at bedtime  sodium chloride 0.9% lock flush 3 milliLiter(s) IV Push every 8 hours  tamsulosin 0.4 milliGRAM(s) Oral at bedtime  warfarin 2 milliGRAM(s) Oral once      Objective:  Vital Signs Last 24 Hrs  T(C): 36.7 (13 Apr 2020 18:03), Max: 36.8 (13 Apr 2020 02:02)  T(F): 98 (13 Apr 2020 18:03), Max: 98.3 (13 Apr 2020 14:02)  HR: 87 (13 Apr 2020 18:03) (87 - 108)  BP: --  BP(mean): 92 (13 Apr 2020 18:03) (86 - 94)  RR: 18 (13 Apr 2020 18:03) (17 - 18)  SpO2: 96% (13 Apr 2020 18:03) (94% - 96%)    PHYSICAL EXAM:  Constitutional:no acute distress  Eyes:ALONSO, EOMI  Ear/Nose/Throat: no oral lesions, 	  Respiratory: clear BL  Cardiovascular: LVAD sounds  Gastrointestinal:soft, (+) BS, no tenderness  Extremities:no e/e/c  No Lymphadenopathy  IV sites not inflammed.    LABS:                        13.7   4.76  )-----------( 376      ( 13 Apr 2020 05:33 )             40.4     04-13    131<L>  |  97  |  36<H>  ----------------------------<  125<H>  4.5   |  20<L>  |  1.18    Ca    9.5      13 Apr 2020 05:33    TPro  8.5<H>  /  Alb  4.0  /  TBili  0.3  /  DBili  x   /  AST  23  /  ALT  14  /  AlkPhos  66  04-13    PT/INR - ( 13 Apr 2020 05:33 )   PT: 16.3 sec;   INR: 1.40 ratio         PTT - ( 13 Apr 2020 05:33 )  PTT:28.7 sec      MICROBIOLOGY:            RECENT CULTURES:      RADIOLOGY & ADDITIONAL STUDIES:    < from: Xray Chest 1 View- PORTABLE-Routine (04.09.20 @ 05:02) >  FINDINGS/  IMPRESSION:    New opacity at the right base. The left lung is clear.    LVAD is again noted.    < end of copied text >

## 2020-04-13 NOTE — PROGRESS NOTE ADULT - PROBLEM SELECTOR PLAN 2
- Started Metoprolol Succinate  25mg PO daily (hold for MAPs <70)  - Plan to start lisinopril 5 mg PO daily tomorrow  - Start Lasix 20 mg PO daily, continue to monitor Cr

## 2020-04-13 NOTE — PROGRESS NOTE ADULT - PROBLEM SELECTOR PLAN 2
Heart Failure following  Continue ASA 81 daily   Continue AC with Coumadin - 2 mg tonight   Start Lovenox 50 mg SQ BID, Lasix 20 mg QD and Toprol XL 25 mg QD   If pt tolerates Toprol well, will add Lisinopril 5 mg QD tomorrow 4/14   Continue to monitor daily labs CBC/CMP/INR/LDH

## 2020-04-13 NOTE — PROGRESS NOTE ADULT - ASSESSMENT
64 year old man with ACC/AHA stage D HF due to NICM, s/p HM2 LVAD  in 9/12/17 as destination therapy due to severe peripheral artery disease with significant stenosis. He currently presents with COVID-19 accompanied by abdominal pain, diarrhea and acute renal failure. He completed a 5 day course of Plaquenil on 4/5, remains afebrile. Currently voiding freely, however did experience urinary rentention on 4/6 which required aleman to be reinserted. Due to elevated MAPs plan to start Metoprolol Succinate 25 mg PO daily today. INR subtherapeutic today, plan to start Lovenox 50 mg BID while patient remains in house. Currently euvolemic on exam.  Responded well to IV diuretic dose.  Plan to discharge home hopefully tomorrow

## 2020-04-13 NOTE — PROGRESS NOTE ADULT - PROBLEM SELECTOR PLAN 1
COVID Isolation/ Droplet precautions  ID following  Completed Plaquenil taper  Daily CBC w/ diff, CMP & CRP  Continue to monitor O2 sats  Discharge planning: home w/ PT when stable - possibly tomorrow 4/14

## 2020-04-13 NOTE — PROGRESS NOTE ADULT - ASSESSMENT
ASSESSMENT:  64/M with PMH CHF s/p LVAD Sept 2017 (on Warfarin), CAD, s/p TVR, SIADH, Depression, CKD-3, past E. coli UTIs  P/w generalized weakness since 4 days; chills since 3 days; non-specific abdominal pain and constipation since 2 days and loose stools today  H/o sick contacts with Covid-19 at home. Imaging concerning for new opacities in RUL, Covid-19 positive  =========  LVAD with Covid-19 infection  - Covid-19 Labs - ; Procalcitonin 0.45 elevated ferritin and CRP  - completed 5 day course of PO Hydroxychloroquine  - repeat CXR showed new Rt lung opacity. Pt afebrile, on RA, with no acute symptoms of cough. Would monitor off antibiotics for now- likely related to COVID pneumonia    RECOMMENDATIONS:  - Continue supplemental O2 and supportive care per primary team  - completed course of plaquenil and oxygenation is stable on RA  - Continue Contact and Airborne Isolation precautions  - discharge planning    Morris Prater MD  [phone]  After 5pm/weekends [phone]

## 2020-04-13 NOTE — PROGRESS NOTE ADULT - SUBJECTIVE AND OBJECTIVE BOX
Subjective: Patient seen and examined resting in bed. ON no issues, no complaints today.     Medications:  acetaminophen   Tablet .. 650 milliGRAM(s) Oral every 6 hours PRN  aMIOdarone    Tablet   Oral   aMIOdarone    Tablet 200 milliGRAM(s) Oral daily  aspirin enteric coated 81 milliGRAM(s) Oral daily  chlorhexidine 4% Liquid 1 Application(s) Topical <User Schedule>  enoxaparin Injectable 50 milliGRAM(s) SubCutaneous every 12 hours  famotidine    Tablet 20 milliGRAM(s) Oral daily  finasteride 5 milliGRAM(s) Oral daily  furosemide    Tablet 20 milliGRAM(s) Oral daily  guaiFENesin   Syrup  (Sugar-Free) 100 milliGRAM(s) Oral every 6 hours PRN  metoprolol succinate ER 25 milliGRAM(s) Oral daily  mirtazapine 7.5 milliGRAM(s) Oral at bedtime  polyethylene glycol 3350 17 Gram(s) Oral daily PRN  senna 2 Tablet(s) Oral at bedtime  sodium chloride 0.9% lock flush 3 milliLiter(s) IV Push every 8 hours  tamsulosin 0.4 milliGRAM(s) Oral at bedtime  warfarin 2 milliGRAM(s) Oral once      Vitals:  Vital Signs Last 24 Hours  T(C): 36.4 (04-13-20 @ 06:15), Max: 36.8 (04-12-20 @ 14:00)  HR: 104 (04-13-20 @ 10:09) (102 - 108)  BP: --  RR: 18 (04-13-20 @ 10:09) (16 - 18)  SpO2: 95% (04-13-20 @ 10:09) (94% - 97%)      Tele: SR/ST   O2 Sat: 90-95%      I&O's Summary    12 Apr 2020 07:01  -  13 Apr 2020 07:00  --------------------------------------------------------  IN: 360 mL / OUT: 850 mL / NET: -490 mL    13 Apr 2020 07:01  -  13 Apr 2020 12:51  --------------------------------------------------------  IN: 0 mL / OUT: 200 mL / NET: -200 mL      Physical Exam  General: No distress. Comfortable.  HEENT: EOM intact.  Neck: Neck supple. JVP midly elevated. No masses  Chest: Clear to auscultation bilaterally  CV: +VAD hum  Abdomen: Soft, non-distended, non-tender  Skin: No rashes or skin breakdown, driveline dressing c/d/i  Neurology: Alert and oriented times three. Sensation intact  Psych: Affect normal    LVAD Interrogation  VAD TYPE: HM 2  Speed: 8800  Flow: 4  Power: 4.3  PI: 4.2  Assessment of driveline exit site: driveline dressing c/d/i  Programming changes: no changes made    Labs:                        13.7   4.76  )-----------( 376      ( 13 Apr 2020 05:33 )             40.4     04-13    131<L>  |  97  |  36<H>  ----------------------------<  125<H>  4.5   |  20<L>  |  1.18    Ca    9.5      13 Apr 2020 05:33    TPro  8.5<H>  /  Alb  4.0  /  TBili  0.3  /  DBili  x   /  AST  23  /  ALT  14  /  AlkPhos  66  04-13    PT/INR - ( 13 Apr 2020 05:33 )   PT: 16.3 sec;   INR: 1.40 ratio         PTT - ( 13 Apr 2020 05:33 )  PTT:28.7 sec      Lactate Dehydrogenase, Serum: 330 U/L (04-13 @ 05:33)  Lactate Dehydrogenase, Serum: 370 U/L (04-12 @ 07:07)  Lactate Dehydrogenase, Serum: 368 U/L (04-11 @ 06:56)        Imaging Studies   < from: Limited Transthoracic Echo (04.08.20 @ 15:20) >  Conclusions:  Heartmate II at 5pm  Speed: 8800  1. Tethered mitral valve leflets with normal opening. Mild  mitral regurgitation.  2. The aortic valve is predominately closed. Mild  continuious  aortic regurgitation.  3. Severe global left ventricular systolic dysfunction.  Septum midline. LVAD not seen. Limited study.  4. The right ventricle is not well visualized. Right  ventricular enlargement (4.4cm at mid RV)  with decreased  right ventricular systolic function.  ADDENDUM 4/9/2020: add RAP  IVC was 2.8cm  with minimal collapse. RAP about 15mmHg    < end of copied text > Subjective: Patient seen and examined resting in bed. ON no issues, no complaints today.     Medications:  acetaminophen   Tablet .. 650 milliGRAM(s) Oral every 6 hours PRN  aMIOdarone    Tablet   Oral   aMIOdarone    Tablet 200 milliGRAM(s) Oral daily  aspirin enteric coated 81 milliGRAM(s) Oral daily  chlorhexidine 4% Liquid 1 Application(s) Topical <User Schedule>  enoxaparin Injectable 50 milliGRAM(s) SubCutaneous every 12 hours  famotidine    Tablet 20 milliGRAM(s) Oral daily  finasteride 5 milliGRAM(s) Oral daily  furosemide    Tablet 20 milliGRAM(s) Oral daily  guaiFENesin   Syrup  (Sugar-Free) 100 milliGRAM(s) Oral every 6 hours PRN  metoprolol succinate ER 25 milliGRAM(s) Oral daily  mirtazapine 7.5 milliGRAM(s) Oral at bedtime  polyethylene glycol 3350 17 Gram(s) Oral daily PRN  senna 2 Tablet(s) Oral at bedtime  sodium chloride 0.9% lock flush 3 milliLiter(s) IV Push every 8 hours  tamsulosin 0.4 milliGRAM(s) Oral at bedtime  warfarin 2 milliGRAM(s) Oral once      Vitals:  Vital Signs Last 24 Hours  T(C): 36.4 (04-13-20 @ 06:15), Max: 36.8 (04-12-20 @ 14:00)  HR: 104 (04-13-20 @ 10:09) (102 - 108)  BP: --  RR: 18 (04-13-20 @ 10:09) (16 - 18)  SpO2: 95% (04-13-20 @ 10:09) (94% - 97%)      Tele: SR/ST   O2 Sat: 90-95%      I&O's Summary    12 Apr 2020 07:01  -  13 Apr 2020 07:00  --------------------------------------------------------  IN: 360 mL / OUT: 850 mL / NET: -490 mL    13 Apr 2020 07:01  -  13 Apr 2020 12:51  --------------------------------------------------------  IN: 0 mL / OUT: 200 mL / NET: -200 mL      Physical Exam  General: No distress. Comfortable.  HEENT: EOM intact.  Neck: Neck supple. JVP midly elevated. No masses  Chest: Clear to auscultation bilaterally  CV: +VAD hum  Abdomen: Soft, non-distended, non-tender  Skin: No rashes or skin breakdown, driveline dressing c/d/i  Neurology: Alert and oriented times three. Sensation intact  Psych: Affect normal    LVAD Interrogation  VAD TYPE: HM 2  Speed: 8800  Flow: 3.3  Power: 4.9  PI: 4.1  Events: PI events  Assessment of driveline exit site: driveline dressing c/d/i  Programming changes: no changes made    Labs:                        13.7   4.76  )-----------( 376      ( 13 Apr 2020 05:33 )             40.4     04-13    131<L>  |  97  |  36<H>  ----------------------------<  125<H>  4.5   |  20<L>  |  1.18    Ca    9.5      13 Apr 2020 05:33    TPro  8.5<H>  /  Alb  4.0  /  TBili  0.3  /  DBili  x   /  AST  23  /  ALT  14  /  AlkPhos  66  04-13    PT/INR - ( 13 Apr 2020 05:33 )   PT: 16.3 sec;   INR: 1.40 ratio         PTT - ( 13 Apr 2020 05:33 )  PTT:28.7 sec      Lactate Dehydrogenase, Serum: 330 U/L (04-13 @ 05:33)  Lactate Dehydrogenase, Serum: 370 U/L (04-12 @ 07:07)  Lactate Dehydrogenase, Serum: 368 U/L (04-11 @ 06:56)        Imaging Studies   < from: Limited Transthoracic Echo (04.08.20 @ 15:20) >  Conclusions:  Heartmate II at 5pm  Speed: 8800  1. Tethered mitral valve leflets with normal opening. Mild  mitral regurgitation.  2. The aortic valve is predominately closed. Mild  continuious  aortic regurgitation.  3. Severe global left ventricular systolic dysfunction.  Septum midline. LVAD not seen. Limited study.  4. The right ventricle is not well visualized. Right  ventricular enlargement (4.4cm at mid RV)  with decreased  right ventricular systolic function.  ADDENDUM 4/9/2020: add RAP  IVC was 2.8cm  with minimal collapse. RAP about 15mmHg    < end of copied text >

## 2020-04-13 NOTE — PROGRESS NOTE ADULT - SUBJECTIVE AND OBJECTIVE BOX
Telemetry:  ST 110s-120    Vital Signs Last 24 Hrs  T(C): 36.8 (04-13-20 @ 14:02), Max: 36.8 (04-13-20 @ 02:02)  T(F): 98.3 (04-13-20 @ 14:02), Max: 98.3 (04-13-20 @ 14:02)  HR: 98 (04-13-20 @ 14:02) (98 - 108)  BP: --  RR: 18 (04-13-20 @ 14:02) (16 - 18)  SpO2: 96% (04-13-20 @ 14:02) (94% - 97%)             04-12 @ 07:01  -  04-13 @ 07:00  --------------------------------------------------------  IN: 360 mL / OUT: 850 mL / NET: -490 mL    04-13 @ 07:01  -  04-13 @ 14:38  --------------------------------------------------------  IN: 0 mL / OUT: 200 mL / NET: -200 mL      Coumadin    [ x ] YES          [  ] NO         Reason: LVAD                              13.7   4.76  )-----------( 376      ( 13 Apr 2020 05:33 )             40.4     04-13    131<L>  |  97  |  36<H>  ----------------------------<  125<H>  4.5   |  20<L>  |  1.18    Ca    9.5      13 Apr 2020 05:33    TPro  8.5<H>  /  Alb  4.0  /  TBili  0.3  /  DBili  x   /  AST  23  /  ALT  14  /  AlkPhos  66  04-13    PT/INR - ( 13 Apr 2020 05:33 )   PT: 16.3 sec;   INR: 1.40 ratio      PTT - ( 13 Apr 2020 05:33 )  PTT:28.7 sec      PHYSICAL EXAM: as per heart failure progress note from today 4/13      acetaminophen Tablet .. 650 milliGRAM(s) Oral every 6 hours PRN  aMIOdarone Tablet   Oral   aMIOdarone Tablet 200 milliGRAM(s) Oral daily  aspirin enteric coated 81 milliGRAM(s) Oral daily  bisacodyl Suppository 10 milliGRAM(s) Rectal once  chlorhexidine 4% Liquid 1 Application(s) Topical <User Schedule>  enoxaparin Injectable 50 milliGRAM(s) SubCutaneous every 12 hours  famotidine Tablet 20 milliGRAM(s) Oral daily  finasteride 5 milliGRAM(s) Oral daily  furosemide  Tablet 20 milliGRAM(s) Oral daily  guaiFENesin Syrup  (Sugar-Free) 100 milliGRAM(s) Oral every 6 hours PRN  metoprolol succinate ER 25 milliGRAM(s) Oral daily  mirtazapine 7.5 milliGRAM(s) Oral at bedtime  polyethylene glycol 3350 17 Gram(s) Oral daily  senna 2 Tablet(s) Oral at bedtime  sodium chloride 0.9% lock flush 3 milliLiter(s) IV Push every 8 hours  tamsulosin 0.4 milliGRAM(s) Oral at bedtime  warfarin 2 milliGRAM(s) Oral once       Physical Therapy Rec:   Home  [  ]   Home w/ PT  [ x ]  Rehab  [  ]    Discussed with Cardiothoracic Team at AM rounds.

## 2020-04-13 NOTE — PROGRESS NOTE ADULT - PROBLEM SELECTOR PLAN 4
Last BM 4/7   Currently on Senna daily   Start Miralax QD - 1st dose now   Dulcolax suppository x 1   F/u BM

## 2020-04-13 NOTE — PROGRESS NOTE ADULT - PROBLEM SELECTOR PLAN 3
- Countinue Coumadin for LVAD, will dose 2 mg tonight; INR at goal 2-3   - start Lovenox 50 mg BID while in house for subtherapeutic INR 1.4 today, of note patient will not be discharged home on Lovenox  - Continue ASA  - Monitor MAPs, goal 70-80s

## 2020-04-14 DIAGNOSIS — E87.1 HYPO-OSMOLALITY AND HYPONATREMIA: ICD-10-CM

## 2020-04-14 LAB
ANION GAP SERPL CALC-SCNC: 16 MMOL/L — SIGNIFICANT CHANGE UP (ref 5–17)
APTT BLD: 33.7 SEC — SIGNIFICANT CHANGE UP (ref 27.5–36.3)
BUN SERPL-MCNC: 38 MG/DL — HIGH (ref 7–23)
CALCIUM SERPL-MCNC: 9.6 MG/DL — SIGNIFICANT CHANGE UP (ref 8.4–10.5)
CHLORIDE SERPL-SCNC: 95 MMOL/L — LOW (ref 96–108)
CO2 SERPL-SCNC: 16 MMOL/L — LOW (ref 22–31)
CREAT SERPL-MCNC: 1.22 MG/DL — SIGNIFICANT CHANGE UP (ref 0.5–1.3)
GLUCOSE SERPL-MCNC: 110 MG/DL — HIGH (ref 70–99)
HCT VFR BLD CALC: 42.1 % — SIGNIFICANT CHANGE UP (ref 39–50)
HGB BLD-MCNC: 13.5 G/DL — SIGNIFICANT CHANGE UP (ref 13–17)
INR BLD: 1.26 RATIO — HIGH (ref 0.88–1.16)
LDH SERPL L TO P-CCNC: 335 U/L — HIGH (ref 50–242)
MCHC RBC-ENTMCNC: 28 PG — SIGNIFICANT CHANGE UP (ref 27–34)
MCHC RBC-ENTMCNC: 32.1 GM/DL — SIGNIFICANT CHANGE UP (ref 32–36)
MCV RBC AUTO: 87.3 FL — SIGNIFICANT CHANGE UP (ref 80–100)
NRBC # BLD: 0 /100 WBCS — SIGNIFICANT CHANGE UP (ref 0–0)
PLATELET # BLD AUTO: 370 K/UL — SIGNIFICANT CHANGE UP (ref 150–400)
POTASSIUM SERPL-MCNC: 4.9 MMOL/L — SIGNIFICANT CHANGE UP (ref 3.5–5.3)
POTASSIUM SERPL-SCNC: 4.9 MMOL/L — SIGNIFICANT CHANGE UP (ref 3.5–5.3)
PROTHROM AB SERPL-ACNC: 14.5 SEC — HIGH (ref 10–12.9)
RBC # BLD: 4.82 M/UL — SIGNIFICANT CHANGE UP (ref 4.2–5.8)
RBC # FLD: 13.2 % — SIGNIFICANT CHANGE UP (ref 10.3–14.5)
SODIUM SERPL-SCNC: 127 MMOL/L — LOW (ref 135–145)
WBC # BLD: 5.52 K/UL — SIGNIFICANT CHANGE UP (ref 3.8–10.5)
WBC # FLD AUTO: 5.52 K/UL — SIGNIFICANT CHANGE UP (ref 3.8–10.5)

## 2020-04-14 PROCEDURE — 99232 SBSQ HOSP IP/OBS MODERATE 35: CPT

## 2020-04-14 PROCEDURE — 99233 SBSQ HOSP IP/OBS HIGH 50: CPT | Mod: 25

## 2020-04-14 PROCEDURE — 93750 INTERROGATION VAD IN PERSON: CPT

## 2020-04-14 PROCEDURE — 70450 CT HEAD/BRAIN W/O DYE: CPT | Mod: 26

## 2020-04-14 RX ORDER — WARFARIN SODIUM 2.5 MG/1
2 TABLET ORAL ONCE
Refills: 0 | Status: COMPLETED | OUTPATIENT
Start: 2020-04-14 | End: 2020-04-14

## 2020-04-14 RX ORDER — SODIUM CHLORIDE 9 MG/ML
500 INJECTION INTRAMUSCULAR; INTRAVENOUS; SUBCUTANEOUS ONCE
Refills: 0 | Status: COMPLETED | OUTPATIENT
Start: 2020-04-14 | End: 2020-04-14

## 2020-04-14 RX ORDER — LISINOPRIL 2.5 MG/1
5 TABLET ORAL DAILY
Refills: 0 | Status: DISCONTINUED | OUTPATIENT
Start: 2020-04-14 | End: 2020-04-20

## 2020-04-14 RX ADMIN — AMIODARONE HYDROCHLORIDE 200 MILLIGRAM(S): 400 TABLET ORAL at 05:35

## 2020-04-14 RX ADMIN — WARFARIN SODIUM 2 MILLIGRAM(S): 2.5 TABLET ORAL at 21:46

## 2020-04-14 RX ADMIN — FAMOTIDINE 20 MILLIGRAM(S): 10 INJECTION INTRAVENOUS at 10:04

## 2020-04-14 RX ADMIN — LISINOPRIL 5 MILLIGRAM(S): 2.5 TABLET ORAL at 18:40

## 2020-04-14 RX ADMIN — Medication 81 MILLIGRAM(S): at 10:03

## 2020-04-14 RX ADMIN — ENOXAPARIN SODIUM 50 MILLIGRAM(S): 100 INJECTION SUBCUTANEOUS at 21:45

## 2020-04-14 RX ADMIN — FINASTERIDE 5 MILLIGRAM(S): 5 TABLET, FILM COATED ORAL at 10:04

## 2020-04-14 RX ADMIN — ENOXAPARIN SODIUM 50 MILLIGRAM(S): 100 INJECTION SUBCUTANEOUS at 10:03

## 2020-04-14 RX ADMIN — Medication 10 MILLIGRAM(S): at 10:03

## 2020-04-14 RX ADMIN — SODIUM CHLORIDE 3 MILLILITER(S): 9 INJECTION INTRAMUSCULAR; INTRAVENOUS; SUBCUTANEOUS at 05:36

## 2020-04-14 RX ADMIN — Medication 20 MILLIGRAM(S): at 05:35

## 2020-04-14 RX ADMIN — SODIUM CHLORIDE 100 MILLILITER(S): 9 INJECTION INTRAMUSCULAR; INTRAVENOUS; SUBCUTANEOUS at 11:37

## 2020-04-14 RX ADMIN — POLYETHYLENE GLYCOL 3350 17 GRAM(S): 17 POWDER, FOR SOLUTION ORAL at 10:05

## 2020-04-14 RX ADMIN — CHLORHEXIDINE GLUCONATE 1 APPLICATION(S): 213 SOLUTION TOPICAL at 08:57

## 2020-04-14 RX ADMIN — Medication 25 MILLIGRAM(S): at 05:35

## 2020-04-14 RX ADMIN — SODIUM CHLORIDE 3 MILLILITER(S): 9 INJECTION INTRAMUSCULAR; INTRAVENOUS; SUBCUTANEOUS at 21:46

## 2020-04-14 RX ADMIN — TAMSULOSIN HYDROCHLORIDE 0.4 MILLIGRAM(S): 0.4 CAPSULE ORAL at 21:46

## 2020-04-14 RX ADMIN — MIRTAZAPINE 7.5 MILLIGRAM(S): 45 TABLET, ORALLY DISINTEGRATING ORAL at 21:45

## 2020-04-14 RX ADMIN — SODIUM CHLORIDE 3 MILLILITER(S): 9 INJECTION INTRAMUSCULAR; INTRAVENOUS; SUBCUTANEOUS at 09:56

## 2020-04-14 NOTE — PROGRESS NOTE ADULT - SUBJECTIVE AND OBJECTIVE BOX
Subjective: Patient seen and examined resting in bed. ON no issues. Complains of abdominal and back pain.     Medications:  acetaminophen   Tablet .. 650 milliGRAM(s) Oral every 6 hours PRN  aMIOdarone    Tablet   Oral   aMIOdarone    Tablet 200 milliGRAM(s) Oral daily  aspirin enteric coated 81 milliGRAM(s) Oral daily  chlorhexidine 4% Liquid 1 Application(s) Topical <User Schedule>  enoxaparin Injectable 50 milliGRAM(s) SubCutaneous every 12 hours  famotidine    Tablet 20 milliGRAM(s) Oral daily  finasteride 5 milliGRAM(s) Oral daily  guaiFENesin   Syrup  (Sugar-Free) 100 milliGRAM(s) Oral every 6 hours PRN  lisinopril 5 milliGRAM(s) Oral daily  metoprolol succinate ER 25 milliGRAM(s) Oral daily  mirtazapine 7.5 milliGRAM(s) Oral at bedtime  polyethylene glycol 3350 17 Gram(s) Oral daily  senna 2 Tablet(s) Oral at bedtime  sodium chloride 0.9% lock flush 3 milliLiter(s) IV Push every 8 hours  tamsulosin 0.4 milliGRAM(s) Oral at bedtime  warfarin 2 milliGRAM(s) Oral once    Vitals:  Vital Signs Last 24 Hours  T(C): 36.5 (04-14-20 @ 10:03), Max: 36.8 (04-13-20 @ 14:02)  HR: 97 (04-14-20 @ 10:03) (87 - 99)  BP: 105/75 (04-14-20 @ 05:21) (105/75 - 105/75)  RR: 18 (04-14-20 @ 10:03) (18 - 18)  SpO2: 98% (04-14-20 @ 10:03) (94% - 100%)    Tele: SR/ST       I&O's Summary    13 Apr 2020 07:01  -  14 Apr 2020 07:00  --------------------------------------------------------  IN: 0 mL / OUT: 400 mL / NET: -400 mL      Physical Exam:  General: No distress. Comfortable.  HEENT: EOM intact.  Neck: Neck supple. JVP midly elevated. No masses  Chest: Clear to auscultation bilaterally  CV: +VAD hum  Abdomen: Soft, non-distended, non-tender  Skin: No rashes or skin breakdown, driveline dressing c/d/i  Neurology: Alert and oriented times three. Sensation intact  Psych: Affect normal    LVAD Interrogation  VAD TYPE: HM 2  Speed: 8800  Flow: 3.7  Power: 4.4  PI: 5.6   Assessment of driveline exit site: dressing c/d/i   Programming changes: no program changes made    Labs:                        13.5   5.52  )-----------( 370      ( 14 Apr 2020 05:39 )             42.1     04-14    127<L>  |  95<L>  |  38<H>  ----------------------------<  110<H>  4.9   |  16<L>  |  1.22    Ca    9.6      14 Apr 2020 05:39    TPro  8.5<H>  /  Alb  4.0  /  TBili  0.3  /  DBili  x   /  AST  23  /  ALT  14  /  AlkPhos  66  04-13    PT/INR - ( 14 Apr 2020 05:39 )   PT: 14.5 sec;   INR: 1.26 ratio         PTT - ( 14 Apr 2020 05:39 )  PTT:33.7 sec      Lactate Dehydrogenase, Serum: 335 U/L (04-14 @ 05:39)  Lactate Dehydrogenase, Serum: 330 U/L (04-13 @ 05:33)  Lactate Dehydrogenase, Serum: 370 U/L (04-12 @ 07:07)      Imaging Studies   < from: CT Head No Cont (04.14.20 @ 12:42) >  IMPRESSION:  No acute intracranial findings.  < end of copied text > Subjective: Patient seen and examined resting in bed. ON no issues. Complains of abdominal and back pain.     Medications:  acetaminophen   Tablet .. 650 milliGRAM(s) Oral every 6 hours PRN  aMIOdarone    Tablet   Oral   aMIOdarone    Tablet 200 milliGRAM(s) Oral daily  aspirin enteric coated 81 milliGRAM(s) Oral daily  chlorhexidine 4% Liquid 1 Application(s) Topical <User Schedule>  enoxaparin Injectable 50 milliGRAM(s) SubCutaneous every 12 hours  famotidine    Tablet 20 milliGRAM(s) Oral daily  finasteride 5 milliGRAM(s) Oral daily  guaiFENesin   Syrup  (Sugar-Free) 100 milliGRAM(s) Oral every 6 hours PRN  lisinopril 5 milliGRAM(s) Oral daily  metoprolol succinate ER 25 milliGRAM(s) Oral daily  mirtazapine 7.5 milliGRAM(s) Oral at bedtime  polyethylene glycol 3350 17 Gram(s) Oral daily  senna 2 Tablet(s) Oral at bedtime  sodium chloride 0.9% lock flush 3 milliLiter(s) IV Push every 8 hours  tamsulosin 0.4 milliGRAM(s) Oral at bedtime  warfarin 2 milliGRAM(s) Oral once    Vitals:  Vital Signs Last 24 Hours  T(C): 36.5 (04-14-20 @ 10:03), Max: 36.8 (04-13-20 @ 14:02)  HR: 97 (04-14-20 @ 10:03) (87 - 99)  BP: 105/75 (04-14-20 @ 05:21) (105/75 - 105/75)  RR: 18 (04-14-20 @ 10:03) (18 - 18)  SpO2: 98% (04-14-20 @ 10:03) (94% - 100%)    Tele: SR/ST       I&O's Summary    13 Apr 2020 07:01  -  14 Apr 2020 07:00  --------------------------------------------------------  IN: 0 mL / OUT: 400 mL / NET: -400 mL      Physical Exam:  General: No distress. Comfortable.  HEENT: EOM intact.  Neck: Neck supple. JVP midly elevated. No masses  Chest: Clear to auscultation bilaterally  CV: +VAD hum  Abdomen: Soft, non-distended, non-tender  Skin: No rashes or skin breakdown, driveline dressing c/d/i  Neurology: Alert and oriented times three. Sensation intact  Psych: Affect normal    LVAD Interrogation  VAD TYPE: HM 2  Speed: 8800  Flow: 3.5  Power: 3.8  PI: 4.2  Events: LF with speed drops to 160 RPM which were iatrogenic from selecting pump stop on monitor   Assessment of driveline exit site: dressing c/d/i   Programming changes: no program changes made    Labs:                        13.5   5.52  )-----------( 370      ( 14 Apr 2020 05:39 )             42.1     04-14    127<L>  |  95<L>  |  38<H>  ----------------------------<  110<H>  4.9   |  16<L>  |  1.22    Ca    9.6      14 Apr 2020 05:39    TPro  8.5<H>  /  Alb  4.0  /  TBili  0.3  /  DBili  x   /  AST  23  /  ALT  14  /  AlkPhos  66  04-13    PT/INR - ( 14 Apr 2020 05:39 )   PT: 14.5 sec;   INR: 1.26 ratio         PTT - ( 14 Apr 2020 05:39 )  PTT:33.7 sec      Lactate Dehydrogenase, Serum: 335 U/L (04-14 @ 05:39)  Lactate Dehydrogenase, Serum: 330 U/L (04-13 @ 05:33)  Lactate Dehydrogenase, Serum: 370 U/L (04-12 @ 07:07)      Imaging Studies   < from: CT Head No Cont (04.14.20 @ 12:42) >  IMPRESSION:  No acute intracranial findings.  < end of copied text >

## 2020-04-14 NOTE — PROGRESS NOTE ADULT - PROBLEM SELECTOR PLAN 2
Heart Failure following  Continue ASA 81 daily   Continue AC with Coumadin - 2 mg tonight   Continue Lovenox 50 mg SQ BID until discharged - pt will not go home on Lovenox Continue Toprol XL 25 mg QD   Start Lisinopril 5 mg PO QD   D/c Lasix 20 mg QD   500cc bolus NS given   Continue to monitor daily labs CBC/CMP/INR/LDH

## 2020-04-14 NOTE — PROGRESS NOTE ADULT - PROBLEM SELECTOR PLAN 5
- Noted to go into VT (lasting >1 min on 4/7)  - Continue Amio 200 mg daily (was initially started on 4/7)

## 2020-04-14 NOTE — PROGRESS NOTE ADULT - ASSESSMENT
64 year old man with ACC/AHA stage D HF due to NICM, s/p HM2 LVAD  in 9/12/17 as destination therapy due to severe peripheral artery disease with significant stenosis. He currently presents with COVID-19 accompanied by abdominal pain, diarrhea and acute renal failure. He completed a 5 day course of Plaquenil on 4/5, remains afebrile. Currently voiding freely, however did experience urinary rentention on 4/6 which required aleman to be reinserted. MAPs remain elevated, started Lisinopril 5 mg PO daily and continue Metoprolol Succinate. INR remains subtherapeutic, continue Lovenox 50 mg BID while patient remains in house. Currently euvolemic on exam.  Today patients LVAD was stopped accidently by hospital staff. About 20-30 mins after pump was restarted he had an episode of unresponsiveness that lasted about a minute and self resolved. Neuro exam was unremarkable. CT head was completed and was negative. Will continue to monitor. Plan for discharge hopefully tomorrow vs Thursday pending INR.

## 2020-04-14 NOTE — PROGRESS NOTE ADULT - SUBJECTIVE AND OBJECTIVE BOX
Telemetry: NSR 80s-90s     Vital Signs Last 24 Hrs  T(C): 36.5 (04-14-20 @ 10:03), Max: 36.8 (04-13-20 @ 14:02)  T(F): 97.7 (04-14-20 @ 10:03), Max: 98.3 (04-13-20 @ 14:02)  HR: 97 (04-14-20 @ 10:03) (87 - 99)  BP: 105/75 (04-14-20 @ 05:21) (105/75 - 105/75)  RR: 18 (04-14-20 @ 10:03) (18 - 18)  SpO2: 98% (04-14-20 @ 10:03) (94% - 100%)             04-13 @ 07:01  -  04-14 @ 07:00  --------------------------------------------------------  IN: 0 mL / OUT: 400 mL / NET: -400 mL      POCT Blood Glucose.: 125 mg/dL (14 Apr 2020 11:15)          Coumadin    [ x ] YES          [  ] NO         Reason: LVAD                              13.5   5.52  )-----------( 370      ( 14 Apr 2020 05:39 )             42.1     04-14    127<L>  |  95<L>  |  38<H>  ----------------------------<  110<H>  4.9   |  16<L>  |  1.22    Ca    9.6      14 Apr 2020 05:39    TPro  8.5<H>  /  Alb  4.0  /  TBili  0.3  /  DBili  x   /  AST  23  /  ALT  14  /  AlkPhos  66  04-13    PT/INR - ( 14 Apr 2020 05:39 )   PT: 14.5 sec;   INR: 1.26 ratio      PTT - ( 14 Apr 2020 05:39 )  PTT:33.7 sec      PHYSICAL EXAM: as per heart failure progress note from today 4/14      acetaminophen Tablet .. 650 milliGRAM(s) Oral every 6 hours PRN  aMIOdarone Tablet   Oral   aMIOdarone Tablet 200 milliGRAM(s) Oral daily  aspirin enteric coated 81 milliGRAM(s) Oral daily  chlorhexidine 4% Liquid 1 Application(s) Topical <User Schedule>  enoxaparin Injectable 50 milliGRAM(s) SubCutaneous every 12 hours  famotidine Tablet 20 milliGRAM(s) Oral daily  finasteride 5 milliGRAM(s) Oral daily  guaiFENesin Syrup  (Sugar-Free) 100 milliGRAM(s) Oral every 6 hours PRN  lisinopril 5 milliGRAM(s) Oral daily  metoprolol succinate ER 25 milliGRAM(s) Oral daily  mirtazapine 7.5 milliGRAM(s) Oral at bedtime  polyethylene glycol 3350 17 Gram(s) Oral daily  senna 2 Tablet(s) Oral at bedtime  sodium chloride 0.9% lock flush 3 milliLiter(s) IV Push every 8 hours  tamsulosin 0.4 milliGRAM(s) Oral at bedtime  warfarin 2 milliGRAM(s) Oral once       Physical Therapy Rec:   Home  [  ]   Home w/ PT  [ x ]  Rehab  [  ]    Discussed with Cardiothoracic Team at AM rounds.

## 2020-04-14 NOTE — PROGRESS NOTE ADULT - PROBLEM SELECTOR PLAN 1
COVID Isolation/ Droplet precautions  ID following  Completed Plaquenil taper  Daily CBC w/ diff, CMP & CRP  Continue to monitor O2 sats  Discharge planning: home w/ PT when stable - possibly tomorrow or Thursday pending INR

## 2020-04-14 NOTE — PROGRESS NOTE ADULT - SUBJECTIVE AND OBJECTIVE BOX
INFECTIOUS DISEASES FOLLOW UP-- Anitra Prater  [phone]    This is a follow up note for this  64yMale with  Shortness of breath  COVID-19+  awake and alert but earlier in the day had a period of unresponsiveness without witnessed seizure activity      ROS:  CONSTITUTIONAL:  No fever, good appetite  CARDIOVASCULAR:  No chest pain or palpitations  RESPIRATORY:  No dyspnea  GASTROINTESTINAL:  No nausea, vomiting, diarrhea, or abdominal pain  GENITOURINARY:  No dysuria  NEUROLOGIC:  No headache,     Allergies    No Known Allergies    Intolerances        ANTIBIOTICS/RELEVANT:  antimicrobials    immunologic:    OTHER:  acetaminophen   Tablet .. 650 milliGRAM(s) Oral every 6 hours PRN  aMIOdarone    Tablet   Oral   aMIOdarone    Tablet 200 milliGRAM(s) Oral daily  aspirin enteric coated 81 milliGRAM(s) Oral daily  chlorhexidine 4% Liquid 1 Application(s) Topical <User Schedule>  enoxaparin Injectable 50 milliGRAM(s) SubCutaneous every 12 hours  famotidine    Tablet 20 milliGRAM(s) Oral daily  finasteride 5 milliGRAM(s) Oral daily  guaiFENesin   Syrup  (Sugar-Free) 100 milliGRAM(s) Oral every 6 hours PRN  lisinopril 5 milliGRAM(s) Oral daily  metoprolol succinate ER 25 milliGRAM(s) Oral daily  mirtazapine 7.5 milliGRAM(s) Oral at bedtime  polyethylene glycol 3350 17 Gram(s) Oral daily  senna 2 Tablet(s) Oral at bedtime  sodium chloride 0.9% lock flush 3 milliLiter(s) IV Push every 8 hours  tamsulosin 0.4 milliGRAM(s) Oral at bedtime  warfarin 2 milliGRAM(s) Oral once      Objective:  Vital Signs Last 24 Hrs  T(C): 36.8 (14 Apr 2020 18:23), Max: 36.9 (14 Apr 2020 15:00)  T(F): 98.2 (14 Apr 2020 18:23), Max: 98.4 (14 Apr 2020 15:00)  HR: 86 (14 Apr 2020 18:23) (86 - 99)  BP: 88/62 (14 Apr 2020 14:09) (88/62 - 105/75)  BP(mean): 88 (14 Apr 2020 18:23) (80 - 88)  RR: 18 (14 Apr 2020 18:23) (18 - 18)  SpO2: 98% (14 Apr 2020 18:23) (94% - 100%)    PHYSICAL EXAM:  Constitutional:no acute distress  Eyes:ALONSO, EOMI  Ear/Nose/Throat: no oral lesions, 	  Respiratory: clear BL  Cardiovascular: LVAD sounds  Gastrointestinal:soft, (+) BS, no tenderness  Extremities:no e/e/c  No Lymphadenopathy  IV sites not inflammed.    LABS:                        13.5   5.52  )-----------( 370      ( 14 Apr 2020 05:39 )             42.1     04-14    127<L>  |  95<L>  |  38<H>  ----------------------------<  110<H>  4.9   |  16<L>  |  1.22    Ca    9.6      14 Apr 2020 05:39    TPro  8.5<H>  /  Alb  4.0  /  TBili  0.3  /  DBili  x   /  AST  23  /  ALT  14  /  AlkPhos  66  04-13    PT/INR - ( 14 Apr 2020 05:39 )   PT: 14.5 sec;   INR: 1.26 ratio         PTT - ( 14 Apr 2020 05:39 )  PTT:33.7 sec      MICROBIOLOGY:            RECENT CULTURES:      RADIOLOGY & ADDITIONAL STUDIES:    < from: CT Head No Cont (04.14.20 @ 12:42) >    IMPRESSION:    No acute intracranial findings.    < end of copied text >

## 2020-04-14 NOTE — PROGRESS NOTE ADULT - ASSESSMENT
65 y/o male PMH ACC/AHA stage D HF due to NICM HM2 LVAD, TV annuloplasty ring 9/12/17 as destination therapy due to severe peripheral artery disease with significant stenosis, SIADH, Depression, CKD-3, with hyperkalemia past E. coli UTIs. Today he reports generalized weakness since 4 days, chills since 3 days, non-specific abdominal pain and constipation since 2 days with loose stools today.  Reports +Covid-19 sick contacts at home. Denies cough, difficulty breathing, chest pain, palpitations. Directed to [facility] ED r/o COVID. Seen by ID -BCx & urine Cx sent, COVID 19 PCR positive- started on Plaquenil, cefepime and flagyl. Renal function improved with volume resuscitation, home BP medications currently on hold.   4/2: VSS, Per ID d/c cefepime and flagyl today. Continue Plaquenil taper. INR 4.27 holding Coumadin tonight, Of note overnight patient had two febrile episodes, Tylenol was given. MAPs went down to 30, IV fluid was given and MAPs improved.   4/3 temp overnight max 101.5- Tylenol prn; wbc 3; CRP 5.6 today; continue to monitor closely on Plaquenil.  MAP's improved today 70's- IV fluid 500 cc normal saline as per Dr. Iyer   continue to monitor O2 sats: 98% RA ; anticoagulation with coumadin INR 3.08- coumadin 2 mg this evening  4/4 VSS ; tmax 100.9 overnight;  MAP 80's; resume bph meds today and d/c aleman sun/ mon  anticoagulation with coumadin INR 2.0- coumadin 2.5 mg this evening;  today, CRP 10.1- Plaquenil taper as per ID, continue to monitor closely   4/5 VSS, complete Plaquenil taper tonight, RA O2 Sat 96%, INR 2.76, Coumadin 2mg per HF. Aleman d/c'd today. Possible d/c home Monday if stable.   4/6 VSS, pt voiding. MAPs>90, resumed back on lisinopril 10 daily, also 500ml NS bolus per HF. INR 4.12, Coumadin on hold tonight.   4/7: VSS, MAPs 68-88. Increased Lisinopril to 10 mg daily, gave 50cc NS bolus and started fluids at 75cc/hr for total of 500cc per HF. Coumadin on hold tonight for INR 4.88. Ensure started to supplement PO intake per nutrition recs.   4/8 INR 4.12 held coumadin ,, dc lisinopril for elevated creatinine  MAPS  60  4/9 MAP 72 amio load for VT, CRP 8.22, creat down to 1.34  4/10: VSS, afebrile overnight, O2 sat 95% on RA, MAPs 72-74, negative 720cc fluid balance. CRP trending down, 5.19 today. . INR 3.02 --> Coumadin held. K 5.7 this AM --> repeat K 5.2. Aleman removed today --> TOV   4/11 VVS; CHF following --> Lasix 40 IV x 1 for volume overload.  Supra-therapeutic INR 3.32 --> Continue to hold coumadin. CRP trending down   4/12   SR  100   INR  2.08   dosed coumadin 1 mg,  02 ssat 92 percent   VSS   Labs stable , on [id number]  4/13: VSS, O2 sat 94-96% on RA, afebrile, MAPs 85-88. INR 1.4 --> Coumadin 2 mg tonight per heart failure team. Last BM 4/7 --> currently on Senna daily, will start Miralax daily - 1st dose stat and give Dulcolax suppository x 1. Per heart failure team, start Lovenox 50 mg SQ BID, Toprol XL 25 mg QD and Lasix 20 mg QD today. If pt tolerates Toprol XL will start Lisinopril 5 mg QD tomorrow.   4/14: VSS, O2 sat % on RA, afebrile, MAPs 80-85. INR 1.26 --> will give Coumadin 2 mg tonight per heart failure team. Per heart failure team, pt had a 30 second episode of unresponsiveness earlier today that resolved spontaneously - CT head was negative for acute intracranial findings. Will continue to monitor. Lisinopril 5 mg PO QD started today, Lasix d/c'd, 500cc bolus NS given and 1.5 L free water restriction initiated given Na 127 per heart failure team.   Discharge planning: home w/ PT when stable - possibly tomorrow or Thursday pending INR 65 y/o male PMH ACC/AHA stage D HF due to NICM HM2 LVAD, TV annuloplasty ring 9/12/17 as destination therapy due to severe peripheral artery disease with significant stenosis, SIADH, Depression, CKD-3, with hyperkalemia past E. coli UTIs. Today he reports generalized weakness since 4 days, chills since 3 days, non-specific abdominal pain and constipation since 2 days with loose stools today.  Reports +Covid-19 sick contacts at home. Denies cough, difficulty breathing, chest pain, palpitations. Directed to [facility] ED r/o COVID. Seen by ID -BCx & urine Cx sent, COVID 19 PCR positive- started on Plaquenil, cefepime and flagyl. Renal function improved with volume resuscitation, home BP medications currently on hold.   4/2: VSS, Per ID d/c cefepime and flagyl today. Continue Plaquenil taper. INR 4.27 holding Coumadin tonight, Of note overnight patient had two febrile episodes, Tylenol was given. MAPs went down to 30, IV fluid was given and MAPs improved.   4/3 temp overnight max 101.5- Tylenol prn; wbc 3; CRP 5.6 today; continue to monitor closely on Plaquenil.  MAP's improved today 70's- IV fluid 500 cc normal saline as per Dr. Iyer   continue to monitor O2 sats: 98% RA ; anticoagulation with coumadin INR 3.08- coumadin 2 mg this evening  4/4 VSS ; tmax 100.9 overnight;  MAP 80's; resume bph meds today and d/c aleman sun/ mon  anticoagulation with coumadin INR 2.0- coumadin 2.5 mg this evening;  today, CRP 10.1- Plaquenil taper as per ID, continue to monitor closely   4/5 VSS, complete Plaquenil taper tonight, RA O2 Sat 96%, INR 2.76, Coumadin 2mg per HF. Aleman d/c'd today. Possible d/c home Monday if stable.   4/6 VSS, pt voiding. MAPs>90, resumed back on lisinopril 10 daily, also 500ml NS bolus per HF. INR 4.12, Coumadin on hold tonight.   4/7: VSS, MAPs 68-88. Increased Lisinopril to 10 mg daily, gave 50cc NS bolus and started fluids at 75cc/hr for total of 500cc per HF. Coumadin on hold tonight for INR 4.88. Ensure started to supplement PO intake per nutrition recs.   4/8 INR 4.12 held coumadin ,, dc lisinopril for elevated creatinine  MAPS  60  4/9 MAP 72 amio load for VT, CRP 8.22, creat down to 1.34  4/10: VSS, afebrile overnight, O2 sat 95% on RA, MAPs 72-74, negative 720cc fluid balance. CRP trending down, 5.19 today. . INR 3.02 --> Coumadin held. K 5.7 this AM --> repeat K 5.2. Aleman removed today --> TOV   4/11 VVS; CHF following --> Lasix 40 IV x 1 for volume overload.  Supra-therapeutic INR 3.32 --> Continue to hold coumadin. CRP trending down   4/12   SR  100   INR  2.08   dosed coumadin 1 mg,  02 ssat 92 percent   VSS   Labs stable , on [id number]  4/13: VSS, O2 sat 94-96% on RA, afebrile, MAPs 85-88. INR 1.4 --> Coumadin 2 mg tonight per heart failure team. Last BM 4/7 --> currently on Senna daily, will start Miralax daily - 1st dose stat and give Dulcolax suppository x 1. Per heart failure team, start Lovenox 50 mg SQ BID, Toprol XL 25 mg QD and Lasix 20 mg QD today. If pt tolerates Toprol XL will start Lisinopril 5 mg QD tomorrow.   4/14: VSS, O2 sat % on RA, afebrile, MAPs 80-85. INR 1.26 --> will give Coumadin 2 mg tonight per heart failure team. Per heart failure team, pt had a 30 second episode of unresponsiveness earlier today that resolved spontaneously - CT head was negative for acute intracranial findings. Will continue to monitor. Lisinopril 5 mg PO QD started today, Lasix d/c'd, 500cc bolus NS given and 1.5 L free water restriction initiated given Na 127 per heart failure team.   Discharge planning: home w/ PT when stable - possibly tomorrow or Thursday pending INR as per heart failure

## 2020-04-14 NOTE — PROGRESS NOTE ADULT - PROBLEM SELECTOR PLAN 2
- Contiunue Metoprolol Succinate  25mg PO daily (hold for MAPs <70)  - Started lisinopril 5 mg PO daily today  - D/c Lasix 20 mg PO daily

## 2020-04-14 NOTE — PROGRESS NOTE ADULT - ATTENDING COMMENTS
65 YO M with a history of ACC/AHA stage D HF due to NICM s/p HM2 LVAD on 9/12/17 as destination therapy (severe PAD) who was admitted with 4/2 with weakness, chills, and abdominal pain and found to have COVID-19 infection. He has never required supplemental O2 and completed a 5 day course of plaquinel. Course notable for VT 4/9 for which he was started on amiodarone and RASHAWN due to hypovolemia which has resolved. Transient episode of AMS today with CT head normal and worsening hyponatremia likely hypovolemic.  - continue metoprolol succinate 25 mg daily and start lisinopril 5 mg daily   - stop lasix and administer 500 cc NS  - continue coumadin, goal INR 2-3. continue ASA.  - therapeutic lovenox in house for bridging (avoiding heparin to avoid increased patient encounters for PTT monitoring given +COVID) and per VAD team he would be poor candidate for outpatient bridging   - increase laxatives, last BM 4/7 .

## 2020-04-14 NOTE — PROGRESS NOTE ADULT - PROBLEM SELECTOR PLAN 3
- Countinue Coumadin for LVAD, will dose 2 mg tonight; INR at goal 2-2.5  - Continue Lovenox 50 mg BID while in house for subtherapeutic INR 1.26 today, of note patient will not be discharged home on Lovenox  - Continue ASA  - Monitor MAPs, goal 70-80s

## 2020-04-14 NOTE — PROGRESS NOTE ADULT - ASSESSMENT
ASSESSMENT:  64/M with PMH CHF s/p LVAD Sept 2017 (on Warfarin), CAD, s/p TVR, SIADH, Depression, CKD-3, past E. coli UTIs  P/w generalized weakness since 4 days; chills since 3 days; non-specific abdominal pain and constipation since 2 days and loose stools today  H/o sick contacts with Covid-19 at home. Imaging concerning for new opacities in RUL, Covid-19 positive  =========  LVAD with Covid-19 infection  - Covid-19 Labs - ; Procalcitonin 0.45 elevated ferritin and CRP  - completed 5 day course of PO Hydroxychloroquine  - repeat CXR showed new Rt lung opacity. Pt afebrile, on RA, with no acute symptoms of cough. Would monitor off antibiotics for now- likely related to COVID pneumonia    RECOMMENDATIONS:  - Continue supplemental O2 and supportive care per primary team  - completed course of plaquenil and oxygenation is stable on RA  - Continue Contact and Airborne Isolation precautions  -CT of head negative for acute event- not certain what unresponsive episode was from      - discharge planning    Morris Prater MD  [phone]  After 5pm/weekends [phone]

## 2020-04-15 LAB
A-TUMOR NECROSIS FACT SERPL-MCNC: <5 PG/ML — SIGNIFICANT CHANGE UP
ANION GAP SERPL CALC-SCNC: 14 MMOL/L — SIGNIFICANT CHANGE UP (ref 5–17)
BUN SERPL-MCNC: 43 MG/DL — HIGH (ref 7–23)
CALCIUM SERPL-MCNC: 9 MG/DL — SIGNIFICANT CHANGE UP (ref 8.4–10.5)
CHLORIDE SERPL-SCNC: 98 MMOL/L — SIGNIFICANT CHANGE UP (ref 96–108)
CO2 SERPL-SCNC: 18 MMOL/L — LOW (ref 22–31)
CREAT SERPL-MCNC: 1.32 MG/DL — HIGH (ref 0.5–1.3)
CRP SERPL-MCNC: 0.45 MG/DL — HIGH (ref 0–0.4)
GLUCOSE SERPL-MCNC: 116 MG/DL — HIGH (ref 70–99)
HCT VFR BLD CALC: 39.3 % — SIGNIFICANT CHANGE UP (ref 39–50)
HGB BLD-MCNC: 12.8 G/DL — LOW (ref 13–17)
IL10 SERPL-MCNC: <5 PG/ML — SIGNIFICANT CHANGE UP
IL12 SERPL-MCNC: <5 PG/ML — SIGNIFICANT CHANGE UP
IL13 SERPL-MCNC: <5 PG/ML — SIGNIFICANT CHANGE UP
IL2 SERPL-MCNC: 778 PG/ML — SIGNIFICANT CHANGE UP
IL2 SERPL-MCNC: <5 PG/ML — SIGNIFICANT CHANGE UP
IL4 SERPL-MCNC: <5 PG/ML — SIGNIFICANT CHANGE UP
IL6 SERPL-MCNC: 7 PG/ML — HIGH
IL8 SERPL-MCNC: <5 PG/ML — SIGNIFICANT CHANGE UP
INR BLD: 1.21 RATIO — HIGH (ref 0.88–1.16)
INTERFERON GAMMA: <5 PG/ML — SIGNIFICANT CHANGE UP
INTERLEUKIN 1 BETA: <5 PG/ML — SIGNIFICANT CHANGE UP
INTERLEUKIN 17: <5 PG/ML — SIGNIFICANT CHANGE UP
INTERLEUKIN 5: <5 PG/ML — SIGNIFICANT CHANGE UP
LDH SERPL L TO P-CCNC: 301 U/L — HIGH (ref 50–242)
MCHC RBC-ENTMCNC: 28.5 PG — SIGNIFICANT CHANGE UP (ref 27–34)
MCHC RBC-ENTMCNC: 32.6 GM/DL — SIGNIFICANT CHANGE UP (ref 32–36)
MCV RBC AUTO: 87.5 FL — SIGNIFICANT CHANGE UP (ref 80–100)
NRBC # BLD: 0 /100 WBCS — SIGNIFICANT CHANGE UP (ref 0–0)
PLATELET # BLD AUTO: 314 K/UL — SIGNIFICANT CHANGE UP (ref 150–400)
POTASSIUM SERPL-MCNC: 4.9 MMOL/L — SIGNIFICANT CHANGE UP (ref 3.5–5.3)
POTASSIUM SERPL-SCNC: 4.9 MMOL/L — SIGNIFICANT CHANGE UP (ref 3.5–5.3)
PROTHROM AB SERPL-ACNC: 14 SEC — HIGH (ref 10–12.9)
RBC # BLD: 4.49 M/UL — SIGNIFICANT CHANGE UP (ref 4.2–5.8)
RBC # FLD: 13.4 % — SIGNIFICANT CHANGE UP (ref 10.3–14.5)
SODIUM SERPL-SCNC: 130 MMOL/L — LOW (ref 135–145)
WBC # BLD: 5.51 K/UL — SIGNIFICANT CHANGE UP (ref 3.8–10.5)
WBC # FLD AUTO: 5.51 K/UL — SIGNIFICANT CHANGE UP (ref 3.8–10.5)

## 2020-04-15 PROCEDURE — 99232 SBSQ HOSP IP/OBS MODERATE 35: CPT

## 2020-04-15 PROCEDURE — 93750 INTERROGATION VAD IN PERSON: CPT

## 2020-04-15 PROCEDURE — 99233 SBSQ HOSP IP/OBS HIGH 50: CPT | Mod: 25

## 2020-04-15 PROCEDURE — 99233 SBSQ HOSP IP/OBS HIGH 50: CPT

## 2020-04-15 RX ORDER — ENOXAPARIN SODIUM 100 MG/ML
70 INJECTION SUBCUTANEOUS EVERY 12 HOURS
Refills: 0 | Status: DISCONTINUED | OUTPATIENT
Start: 2020-04-15 | End: 2020-04-20

## 2020-04-15 RX ORDER — WARFARIN SODIUM 2.5 MG/1
3 TABLET ORAL ONCE
Refills: 0 | Status: COMPLETED | OUTPATIENT
Start: 2020-04-15 | End: 2020-04-15

## 2020-04-15 RX ORDER — SODIUM CHLORIDE 9 MG/ML
500 INJECTION INTRAMUSCULAR; INTRAVENOUS; SUBCUTANEOUS ONCE
Refills: 0 | Status: COMPLETED | OUTPATIENT
Start: 2020-04-15 | End: 2020-04-15

## 2020-04-15 RX ADMIN — Medication 81 MILLIGRAM(S): at 10:38

## 2020-04-15 RX ADMIN — AMIODARONE HYDROCHLORIDE 200 MILLIGRAM(S): 400 TABLET ORAL at 06:01

## 2020-04-15 RX ADMIN — FAMOTIDINE 20 MILLIGRAM(S): 10 INJECTION INTRAVENOUS at 10:38

## 2020-04-15 RX ADMIN — MIRTAZAPINE 7.5 MILLIGRAM(S): 45 TABLET, ORALLY DISINTEGRATING ORAL at 22:01

## 2020-04-15 RX ADMIN — FINASTERIDE 5 MILLIGRAM(S): 5 TABLET, FILM COATED ORAL at 10:38

## 2020-04-15 RX ADMIN — SODIUM CHLORIDE 3 MILLILITER(S): 9 INJECTION INTRAMUSCULAR; INTRAVENOUS; SUBCUTANEOUS at 10:38

## 2020-04-15 RX ADMIN — TAMSULOSIN HYDROCHLORIDE 0.4 MILLIGRAM(S): 0.4 CAPSULE ORAL at 22:01

## 2020-04-15 RX ADMIN — LISINOPRIL 5 MILLIGRAM(S): 2.5 TABLET ORAL at 06:02

## 2020-04-15 RX ADMIN — SODIUM CHLORIDE 3 MILLILITER(S): 9 INJECTION INTRAMUSCULAR; INTRAVENOUS; SUBCUTANEOUS at 22:01

## 2020-04-15 RX ADMIN — CHLORHEXIDINE GLUCONATE 1 APPLICATION(S): 213 SOLUTION TOPICAL at 10:38

## 2020-04-15 RX ADMIN — SODIUM CHLORIDE 100 MILLILITER(S): 9 INJECTION INTRAMUSCULAR; INTRAVENOUS; SUBCUTANEOUS at 13:00

## 2020-04-15 RX ADMIN — WARFARIN SODIUM 3 MILLIGRAM(S): 2.5 TABLET ORAL at 22:01

## 2020-04-15 RX ADMIN — POLYETHYLENE GLYCOL 3350 17 GRAM(S): 17 POWDER, FOR SOLUTION ORAL at 10:39

## 2020-04-15 RX ADMIN — ENOXAPARIN SODIUM 70 MILLIGRAM(S): 100 INJECTION SUBCUTANEOUS at 22:01

## 2020-04-15 RX ADMIN — ENOXAPARIN SODIUM 50 MILLIGRAM(S): 100 INJECTION SUBCUTANEOUS at 10:38

## 2020-04-15 RX ADMIN — SODIUM CHLORIDE 3 MILLILITER(S): 9 INJECTION INTRAMUSCULAR; INTRAVENOUS; SUBCUTANEOUS at 06:03

## 2020-04-15 RX ADMIN — Medication 25 MILLIGRAM(S): at 06:02

## 2020-04-15 NOTE — PROGRESS NOTE ADULT - SUBJECTIVE AND OBJECTIVE BOX
INFECTIOUS DISEASES FOLLOW UP-- Anitra Prater  [phone]    This is a follow up note for this  64yMale with  Shortness of breath COVID-19+  complains of pain at LLQ abdomen      ROS:  CONSTITUTIONAL:  No fever, poor appetite  CARDIOVASCULAR:  No chest pain or palpitations  RESPIRATORY:  No dyspnea  GASTROINTESTINAL:  No nausea, vomiting, diarrhea, or abdominal pain  GENITOURINARY:  No dysuria  NEUROLOGIC:  No headache,     Allergies    No Known Allergies    Intolerances        ANTIBIOTICS/RELEVANT:  antimicrobials    immunologic:    OTHER:  acetaminophen   Tablet .. 650 milliGRAM(s) Oral every 6 hours PRN  aMIOdarone    Tablet   Oral   aMIOdarone    Tablet 200 milliGRAM(s) Oral daily  aspirin enteric coated 81 milliGRAM(s) Oral daily  chlorhexidine 4% Liquid 1 Application(s) Topical <User Schedule>  enoxaparin Injectable 70 milliGRAM(s) SubCutaneous every 12 hours  famotidine    Tablet 20 milliGRAM(s) Oral daily  finasteride 5 milliGRAM(s) Oral daily  guaiFENesin   Syrup  (Sugar-Free) 100 milliGRAM(s) Oral every 6 hours PRN  lisinopril 5 milliGRAM(s) Oral daily  metoprolol succinate ER 25 milliGRAM(s) Oral daily  mirtazapine 7.5 milliGRAM(s) Oral at bedtime  polyethylene glycol 3350 17 Gram(s) Oral daily  senna 2 Tablet(s) Oral at bedtime  sodium chloride 0.9% lock flush 3 milliLiter(s) IV Push every 8 hours  tamsulosin 0.4 milliGRAM(s) Oral at bedtime  warfarin 3 milliGRAM(s) Oral once      Objective:  Vital Signs Last 24 Hrs  T(C): 36.8 (15 Apr 2020 18:11), Max: 36.8 (14 Apr 2020 21:32)  T(F): 98.2 (15 Apr 2020 18:11), Max: 98.3 (14 Apr 2020 21:32)  HR: 80 (15 Apr 2020 18:11) (80 - 94)  BP: 82/59 (15 Apr 2020 14:00) (77/63 - 99/70)  BP(mean): 76 (15 Apr 2020 18:11) (67 - 80)  RR: 18 (15 Apr 2020 18:11) (18 - 18)  SpO2: 98% (15 Apr 2020 18:11) (95% - 98%)    PHYSICAL EXAM:  Constitutional:no acute distress  Eyes:ALONSO, EOMI  Ear/Nose/Throat: no oral lesions, 	  Respiratory: clear BL anteriorly  Cardiovascular: LVAD sounds  Gastrointestinal:soft, (+) BS some tenderness left abdomen with indurated area below the skin likely from heparin injection  Extremities:no e/e/c  No Lymphadenopathy  IV sites not inflammed.    LABS:                        12.8   5.51  )-----------( 314      ( 15 Apr 2020 05:28 )             39.3     04-15    130<L>  |  98  |  43<H>  ----------------------------<  116<H>  4.9   |  18<L>  |  1.32<H>    Ca    9.0      15 Apr 2020 05:28      PT/INR - ( 15 Apr 2020 05:28 )   PT: 14.0 sec;   INR: 1.21 ratio         PTT - ( 14 Apr 2020 05:39 )  PTT:33.7 sec      MICROBIOLOGY:            RECENT CULTURES:      RADIOLOGY & ADDITIONAL STUDIES:    < from: CT Head No Cont (04.14.20 @ 12:42) >    IMPRESSION:    No acute intracranial findings.    < end of copied text >

## 2020-04-15 NOTE — PROGRESS NOTE ADULT - ATTENDING COMMENTS
65 YO M with a history of ACC/AHA stage D HF due to NICM s/p HM2 LVAD on 9/12/17 as destination therapy (severe PAD) who was admitted with 4/2 with weakness, chills, and abdominal pain and found to have COVID-19 infection. He has never required supplemental O2 and completed a 5 day course of plaquinel and has downtrending inflammatory markers. Course notable for VT 4/9 for which he was started on amiodarone and RASHAWN due to hypovolemia which has resolved. Course also notable for transient episode of AMS 4/14 with CT head normal and now resolved. He is nearing discharge but has a subtherapeutic INR.   - continue metoprolol succinate 25 mg daily and lisinopril 5 mg daily, MAP at goal  - administer 500 cc NS, continue to hold lasix  - continue coumadin, goal INR 2-3. continue ASA.  - therapeutic lovenox in house for bridging (avoiding heparin to avoid increased patient encounters for PTT monitoring given +COVID) and per VAD team he would be poor candidate for outpatient bridging  - discharge when INR uptrending 63 YO M with a history of ACC/AHA stage D HF due to NICM s/p HM2 LVAD on 9/12/17 as destination therapy (severe PAD) who was admitted with 4/2 with weakness, chills, and abdominal pain and found to have COVID-19 infection. He has never required supplemental O2 and completed a 5 day course of plaquinel and has downtrending inflammatory markers. Course notable for VT 4/9 for which he was started on amiodarone and RASHAWN due to hypovolemia which has resolved. Course also notable for transient episode of AMS 4/14 with CT head normal and now resolved. He is nearing discharge but has a subtherapeutic INR  - continue metoprolol succinate 25 mg daily and lisinopril 5 mg daily, MAP at goal  - administer 500 cc NS, continue to hold lasix  - continue coumadin, goal INR 2-3. continue ASA.  - therapeutic lovenox in house for bridging (avoiding heparin to avoid increased patient encounters for PTT monitoring given +COVID) and per VAD team he would be poor candidate for outpatient bridging  - discharge when INR uptrending

## 2020-04-15 NOTE — PROGRESS NOTE ADULT - SUBJECTIVE AND OBJECTIVE BOX
Subjective: Patient seen and examined resting in bed. ON no issues. Today complains of abdominal and back pain. Denies any n/v/f/c/d.     Medications:  acetaminophen   Tablet .. 650 milliGRAM(s) Oral every 6 hours PRN  aMIOdarone    Tablet   Oral   aMIOdarone    Tablet 200 milliGRAM(s) Oral daily  aspirin enteric coated 81 milliGRAM(s) Oral daily  chlorhexidine 4% Liquid 1 Application(s) Topical <User Schedule>  enoxaparin Injectable 50 milliGRAM(s) SubCutaneous every 12 hours  famotidine    Tablet 20 milliGRAM(s) Oral daily  finasteride 5 milliGRAM(s) Oral daily  guaiFENesin   Syrup  (Sugar-Free) 100 milliGRAM(s) Oral every 6 hours PRN  lisinopril 5 milliGRAM(s) Oral daily  metoprolol succinate ER 25 milliGRAM(s) Oral daily  mirtazapine 7.5 milliGRAM(s) Oral at bedtime  polyethylene glycol 3350 17 Gram(s) Oral daily  senna 2 Tablet(s) Oral at bedtime  sodium chloride 0.9% Bolus 500 milliLiter(s) IV Bolus once  sodium chloride 0.9% lock flush 3 milliLiter(s) IV Push every 8 hours  tamsulosin 0.4 milliGRAM(s) Oral at bedtime  warfarin 3 milliGRAM(s) Oral once    Vitals:  Vital Signs Last 24 Hours  T(C): 36.6 (04-15-20 @ 10:03), Max: 36.9 (04-14-20 @ 15:00)  HR: 92 (04-15-20 @ 10:03) (86 - 97)  BP: 77/63 (04-15-20 @ 10:03) (77/63 - 99/70)  RR: 18 (04-15-20 @ 10:03) (18 - 18)  SpO2: 95% (04-15-20 @ 10:03) (95% - 98%)      Tele:  86-94      I&O's Summary    14 Apr 2020 07:01  -  15 Apr 2020 07:00  --------------------------------------------------------  IN: 860 mL / OUT: 600 mL / NET: 260 mL    15 Apr 2020 07:01  -  15 Apr 2020 12:18  --------------------------------------------------------  IN: 300 mL / OUT: 400 mL / NET: -100 mL      Physical Exam:  General: No distress. Comfortable.  HEENT: EOM intact.  Neck: Neck supple. JVP midly elevated. No masses  Chest: Clear to auscultation bilaterally  CV: +VAD hum  Abdomen: Soft, non-distended, non-tender  Skin: No rashes or skin breakdown, driveline dressing c/d/i  Neurology: Alert and oriented times three. Sensation intact  Psych: Affect normal    LVAD Interrogation  VAD TYPE: HM 2  Speed: 8800  Flow: 3.7  Power: 4.4  PI: 4.9   Assessment of driveline exit site: dressing c/d/i  Programming changes: no changes made     Labs:                        12.8   5.51  )-----------( 314      ( 15 Apr 2020 05:28 )             39.3     04-15    130<L>  |  98  |  43<H>  ----------------------------<  116<H>  4.9   |  18<L>  |  1.32<H>    Ca    9.0      15 Apr 2020 05:28      PT/INR - ( 15 Apr 2020 05:28 )   PT: 14.0 sec;   INR: 1.21 ratio         PTT - ( 14 Apr 2020 05:39 )  PTT:33.7 sec      Lactate Dehydrogenase, Serum: 301 U/L (04-15 @ 05:28)  Lactate Dehydrogenase, Serum: 335 U/L (04-14 @ 05:39)  Lactate Dehydrogenase, Serum: 330 U/L (04-13 @ 05:33)          Imaging Studies   < from: CT Head No Cont (04.14.20 @ 12:42) >  IMPRESSION:  No acute intracranial findings.  < end of copied text >

## 2020-04-15 NOTE — PROGRESS NOTE ADULT - SUBJECTIVE AND OBJECTIVE BOX
VITAL SIGNS    Telemetry:  nsr 80    Vital Signs Last 24 Hrs  T(C): 36.6 (04-15-20 @ 10:03), Max: 36.9 (04-14-20 @ 15:00)  T(F): 97.9 (04-15-20 @ 10:03), Max: 98.4 (04-14-20 @ 15:00)  HR: 92 (04-15-20 @ 10:03) (86 - 97)  BP: 77/63 (04-15-20 @ 10:03) (77/63 - 99/70)  RR: 18 (04-15-20 @ 10:03) (18 - 18)  SpO2: 95% (04-15-20 @ 10:03) (95% - 98%)                   04-14 @ 07:01  -  04-15 @ 07:00  --------------------------------------------------------  IN: 860 mL / OUT: 600 mL / NET: 260 mL    04-15 @ 07:01  -  04-15 @ 12:30  --------------------------------------------------------  IN: 300 mL / OUT: 400 mL / NET: -100 mL          Daily     Daily             CAPILLARY BLOOD GLUCOSE                    Coumadin    x[ ] YES          [  ]      NO                                   PHYSICAL EXAM              acetaminophen   Tablet .. 650 milliGRAM(s) Oral every 6 hours PRN  aMIOdarone    Tablet   Oral   aMIOdarone    Tablet 200 milliGRAM(s) Oral daily  aspirin enteric coated 81 milliGRAM(s) Oral daily  chlorhexidine 4% Liquid 1 Application(s) Topical <User Schedule>  enoxaparin Injectable 50 milliGRAM(s) SubCutaneous every 12 hours  famotidine    Tablet 20 milliGRAM(s) Oral daily  finasteride 5 milliGRAM(s) Oral daily  guaiFENesin   Syrup  (Sugar-Free) 100 milliGRAM(s) Oral every 6 hours PRN  lisinopril 5 milliGRAM(s) Oral daily  metoprolol succinate ER 25 milliGRAM(s) Oral daily  mirtazapine 7.5 milliGRAM(s) Oral at bedtime  polyethylene glycol 3350 17 Gram(s) Oral daily  senna 2 Tablet(s) Oral at bedtime  sodium chloride 0.9% Bolus 500 milliLiter(s) IV Bolus once  sodium chloride 0.9% lock flush 3 milliLiter(s) IV Push every 8 hours  tamsulosin 0.4 milliGRAM(s) Oral at bedtime  warfarin 3 milliGRAM(s) Oral once                    Physical Therapy Rec:   Home  [  ]   Home w/ PT  [  ]  Rehab  [  ]  Discussed with Cardiothoracic Team at AM rounds.

## 2020-04-15 NOTE — PROGRESS NOTE ADULT - PROBLEM SELECTOR PLAN 2
- Contiunue Metoprolol Succinate  25mg PO daily (hold for MAPs <70)  - Continue lisinopril 5 mg PO daily (hold of MAPs <70)

## 2020-04-15 NOTE — PROGRESS NOTE ADULT - ASSESSMENT
63 y/o male PMH ACC/AHA stage D HF due to NICM HM2 LVAD, TV annuloplasty ring 9/12/17 as destination therapy due to severe peripheral artery disease with significant stenosis, SIADH, Depression, CKD-3, with hyperkalemia past E. coli UTIs. Today he reports generalized weakness since 4 days, chills since 3 days, non-specific abdominal pain and constipation since 2 days with loose stools today.  Reports +Covid-19 sick contacts at home. Denies cough, difficulty breathing, chest pain, palpitations. Directed to [facility] ED r/o COVID. Seen by ID -BCx & urine Cx sent, COVID 19 PCR positive- started on Plaquenil, cefepime and flagyl. Renal function improved with volume resuscitation, home BP medications currently on hold.   4/2: VSS, Per ID d/c cefepime and flagyl today. Continue Plaquenil taper. INR 4.27 holding Coumadin tonight, Of note overnight patient had two febrile episodes, Tylenol was given. MAPs went down to 30, IV fluid was given and MAPs improved.   4/3 temp overnight max 101.5- Tylenol prn; wbc 3; CRP 5.6 today; continue to monitor closely on Plaquenil.  MAP's improved today 70's- IV fluid 500 cc normal saline as per Dr. Iyer   continue to monitor O2 sats: 98% RA ; anticoagulation with coumadin INR 3.08- coumadin 2 mg this evening  4/4 VSS ; tmax 100.9 overnight;  MAP 80's; resume bph meds today and d/c aleman sun/ mon  anticoagulation with coumadin INR 2.0- coumadin 2.5 mg this evening;  today, CRP 10.1- Plaquenil taper as per ID, continue to monitor closely   4/5 VSS, complete Plaquenil taper tonight, RA O2 Sat 96%, INR 2.76, Coumadin 2mg per HF. Aleman d/c'd today. Possible d/c home Monday if stable.   4/6 VSS, pt voiding. MAPs>90, resumed back on lisinopril 10 daily, also 500ml NS bolus per HF. INR 4.12, Coumadin on hold tonight.   4/7: VSS, MAPs 68-88. Increased Lisinopril to 10 mg daily, gave 50cc NS bolus and started fluids at 75cc/hr for total of 500cc per HF. Coumadin on hold tonight for INR 4.88. Ensure started to supplement PO intake per nutrition recs.   4/8 INR 4.12 held coumadin ,, dc lisinopril for elevated creatinine  MAPS  60  4/9 MAP 72 amio load for VT, CRP 8.22, creat down to 1.34  4/10: VSS, afebrile overnight, O2 sat 95% on RA, MAPs 72-74, negative 720cc fluid balance. CRP trending down, 5.19 today. . INR 3.02 --> Coumadin held. K 5.7 this AM --> repeat K 5.2. Aleman removed today --> TOV   4/11 VVS; CHF following --> Lasix 40 IV x 1 for volume overload.  Supra-therapeutic INR 3.32 --> Continue to hold coumadin. CRP trending down   4/12   SR  100   INR  2.08   dosed coumadin 1 mg,  02 ssat 92 percent   VSS   Labs stable , on [id number]  4/13: VSS, O2 sat 94-96% on RA, afebrile, MAPs 85-88. INR 1.4 --> Coumadin 2 mg tonight per heart failure team. Last BM 4/7 --> currently on Senna daily, will start Miralax daily - 1st dose stat and give Dulcolax suppository x 1. Per heart failure team, start Lovenox 50 mg SQ BID, Toprol XL 25 mg QD and Lasix 20 mg QD today. If pt tolerates Toprol XL will start Lisinopril 5 mg QD tomorrow.   4/14: VSS, O2 sat % on RA, afebrile, MAPs 80-85. INR 1.26 --> will give Coumadin 2 mg tonight per heart failure team. Per heart failure team, pt had a 30 second episode of unresponsiveness earlier today that resolved spontaneously - CT head was negative for acute intracranial findings. Will continue to monitor. Lisinopril 5 mg PO QD started today, Lasix d/c'd, 500cc bolus NS given and 1.5 L free water restriction initiated given Na 127 per heart failure team.   Discharge planning: home w/ PT when stable - possibly tomorrow or Thursday pending INR as per heart failure  4/15 Pt  with rising creat , n S/S fluid overload, given 500 cc ns over 5 hours  Coumadin.  Head CT from 4/14 neg, no AMS today.    < from: CT Head No Cont (04.14.20 @ 12:42) >  No acute intracranial findings.    < end of copied text >

## 2020-04-15 NOTE — PROGRESS NOTE ADULT - ASSESSMENT
ASSESSMENT:  64/M with PMH CHF s/p LVAD Sept 2017 (on Warfarin), CAD, s/p TVR, SIADH, Depression, CKD-3, past E. coli UTIs  P/w generalized weakness since 4 days; chills since 3 days; non-specific abdominal pain and constipation since 2 days and loose stools today  H/o sick contacts with Covid-19 at home. Imaging concerning for new opacities in RUL, Covid-19 positive  =========  LVAD with Covid-19 infection  - Covid-19 Labs - ; Procalcitonin 0.45 elevated ferritin and CRP  - completed 5 day course of PO Hydroxychloroquine  - repeat CXR showed new Rt lung opacity. Pt afebrile, on RA, with no acute symptoms of cough. Would monitor off antibiotics for now- likely related to COVID pneumonia    RECOMMENDATIONS:  - Continue supplemental O2 and supportive care per primary team  - completed course of plaquenil and oxygenation is stable on RA  - Continue Contact and Airborne Isolation precautions  -CT of head negative for acute event- not certain what unresponsive episode was from    indurated are left lower abdomen likely from heparin injection- will monitor      - discharge planning    Morris Prater MD  [phone]  After 5pm/weekends [phone]

## 2020-04-15 NOTE — PROGRESS NOTE ADULT - PROBLEM SELECTOR PLAN 3
- Countinue Coumadin for LVAD, will dose 3 mg tonight; INR at goal 2-2.5  - Continue Lovenox 50 mg BID while in house for subtherapeutic INR 1.21 today, of note patient will not be discharged home on Lovenox  - Continue ASA  - Monitor MAPs, goal 70-80s - Countinue Coumadin for LVAD, will dose 3 mg tonight; INR at goal 2-2.5  - Continue Lovenox 70 mg BID while in house for subtherapeutic INR 1.21 today, of note patient will not be discharged home on Lovenox  - Continue ASA  - Monitor MAPs, goal 70-80s

## 2020-04-15 NOTE — PROGRESS NOTE ADULT - ASSESSMENT
64 year old man with ACC/AHA stage D HF due to NICM, s/p HM2 LVAD  in 9/12/17 as destination therapy due to severe peripheral artery disease with significant stenosis. He currently presents with COVID-19 accompanied by abdominal pain, diarrhea and acute renal failure. He completed a 5 day course of Plaquenil on 4/5, remains afebrile. Currently voiding freely, however did experience urinary rentention on 4/6 which required aleman to be reinserted. MAPs remain elevated, continue Lisinopril and Metoprolol Succinate. INR remains subtherapeutic, continue Lovenox 50 mg BID while patient remains in house. Noted to have slight uptrend in Creatinine, plan to give 500 cc NS today. Plan to discharge home once renal function had improved and elevated INR, hopefully 1-2 days.

## 2020-04-16 LAB
A-TUMOR NECROSIS FACT SERPL-MCNC: 6 PG/ML — SIGNIFICANT CHANGE UP
ALBUMIN SERPL ELPH-MCNC: 3.8 G/DL — SIGNIFICANT CHANGE UP (ref 3.3–5)
ALP SERPL-CCNC: 59 U/L — SIGNIFICANT CHANGE UP (ref 40–120)
ALT FLD-CCNC: 19 U/L — SIGNIFICANT CHANGE UP (ref 10–45)
ANION GAP SERPL CALC-SCNC: 11 MMOL/L — SIGNIFICANT CHANGE UP (ref 5–17)
APTT BLD: 34.7 SEC — SIGNIFICANT CHANGE UP (ref 27.5–36.3)
AST SERPL-CCNC: 20 U/L — SIGNIFICANT CHANGE UP (ref 10–40)
BILIRUB DIRECT SERPL-MCNC: <0.1 MG/DL — SIGNIFICANT CHANGE UP (ref 0–0.2)
BILIRUB INDIRECT FLD-MCNC: >0.3 MG/DL — SIGNIFICANT CHANGE UP (ref 0.2–1)
BILIRUB SERPL-MCNC: 0.4 MG/DL — SIGNIFICANT CHANGE UP (ref 0.2–1.2)
BUN SERPL-MCNC: 30 MG/DL — HIGH (ref 7–23)
CALCIUM SERPL-MCNC: 9 MG/DL — SIGNIFICANT CHANGE UP (ref 8.4–10.5)
CHLORIDE SERPL-SCNC: 97 MMOL/L — SIGNIFICANT CHANGE UP (ref 96–108)
CO2 SERPL-SCNC: 22 MMOL/L — SIGNIFICANT CHANGE UP (ref 22–31)
CREAT SERPL-MCNC: 1.07 MG/DL — SIGNIFICANT CHANGE UP (ref 0.5–1.3)
CRP SERPL-MCNC: 0.29 MG/DL — SIGNIFICANT CHANGE UP (ref 0–0.4)
FERRITIN SERPL-MCNC: 351 NG/ML — SIGNIFICANT CHANGE UP (ref 30–400)
GLUCOSE SERPL-MCNC: 94 MG/DL — SIGNIFICANT CHANGE UP (ref 70–99)
HCT VFR BLD CALC: 37.9 % — LOW (ref 39–50)
HGB BLD-MCNC: 12.2 G/DL — LOW (ref 13–17)
IL10 SERPL-MCNC: <5 PG/ML — SIGNIFICANT CHANGE UP
IL12 SERPL-MCNC: <5 PG/ML — SIGNIFICANT CHANGE UP
IL13 SERPL-MCNC: <5 PG/ML — SIGNIFICANT CHANGE UP
IL2 SERPL-MCNC: 770 PG/ML — SIGNIFICANT CHANGE UP
IL2 SERPL-MCNC: <5 PG/ML — SIGNIFICANT CHANGE UP
IL4 SERPL-MCNC: <5 PG/ML — SIGNIFICANT CHANGE UP
IL6 SERPL-MCNC: <5 PG/ML — SIGNIFICANT CHANGE UP
IL8 SERPL-MCNC: <5 PG/ML — SIGNIFICANT CHANGE UP
INR BLD: 1.25 RATIO — HIGH (ref 0.88–1.16)
INTERFERON GAMMA: <5 PG/ML — SIGNIFICANT CHANGE UP
INTERLEUKIN 1 BETA: <5 PG/ML — SIGNIFICANT CHANGE UP
INTERLEUKIN 17: <5 PG/ML — SIGNIFICANT CHANGE UP
INTERLEUKIN 5: <5 PG/ML — SIGNIFICANT CHANGE UP
LDH SERPL L TO P-CCNC: 265 U/L — HIGH (ref 50–242)
MAGNESIUM SERPL-MCNC: 2.1 MG/DL — SIGNIFICANT CHANGE UP (ref 1.6–2.6)
MCHC RBC-ENTMCNC: 27.9 PG — SIGNIFICANT CHANGE UP (ref 27–34)
MCHC RBC-ENTMCNC: 32.2 GM/DL — SIGNIFICANT CHANGE UP (ref 32–36)
MCV RBC AUTO: 86.7 FL — SIGNIFICANT CHANGE UP (ref 80–100)
NRBC # BLD: 0 /100 WBCS — SIGNIFICANT CHANGE UP (ref 0–0)
PLATELET # BLD AUTO: 278 K/UL — SIGNIFICANT CHANGE UP (ref 150–400)
POTASSIUM SERPL-MCNC: 4.6 MMOL/L — SIGNIFICANT CHANGE UP (ref 3.5–5.3)
POTASSIUM SERPL-SCNC: 4.6 MMOL/L — SIGNIFICANT CHANGE UP (ref 3.5–5.3)
PROT SERPL-MCNC: 7.6 G/DL — SIGNIFICANT CHANGE UP (ref 6–8.3)
PROTHROM AB SERPL-ACNC: 14.3 SEC — HIGH (ref 10–12.9)
RBC # BLD: 4.37 M/UL — SIGNIFICANT CHANGE UP (ref 4.2–5.8)
RBC # FLD: 13.3 % — SIGNIFICANT CHANGE UP (ref 10.3–14.5)
SODIUM SERPL-SCNC: 130 MMOL/L — LOW (ref 135–145)
WBC # BLD: 4.74 K/UL — SIGNIFICANT CHANGE UP (ref 3.8–10.5)
WBC # FLD AUTO: 4.74 K/UL — SIGNIFICANT CHANGE UP (ref 3.8–10.5)

## 2020-04-16 PROCEDURE — 99232 SBSQ HOSP IP/OBS MODERATE 35: CPT

## 2020-04-16 PROCEDURE — 93750 INTERROGATION VAD IN PERSON: CPT

## 2020-04-16 PROCEDURE — 99233 SBSQ HOSP IP/OBS HIGH 50: CPT | Mod: 25

## 2020-04-16 RX ORDER — WARFARIN SODIUM 2.5 MG/1
4 TABLET ORAL ONCE
Refills: 0 | Status: COMPLETED | OUTPATIENT
Start: 2020-04-16 | End: 2020-04-16

## 2020-04-16 RX ADMIN — FAMOTIDINE 20 MILLIGRAM(S): 10 INJECTION INTRAVENOUS at 11:47

## 2020-04-16 RX ADMIN — SODIUM CHLORIDE 3 MILLILITER(S): 9 INJECTION INTRAMUSCULAR; INTRAVENOUS; SUBCUTANEOUS at 23:44

## 2020-04-16 RX ADMIN — AMIODARONE HYDROCHLORIDE 200 MILLIGRAM(S): 400 TABLET ORAL at 05:44

## 2020-04-16 RX ADMIN — WARFARIN SODIUM 4 MILLIGRAM(S): 2.5 TABLET ORAL at 23:44

## 2020-04-16 RX ADMIN — Medication 81 MILLIGRAM(S): at 11:46

## 2020-04-16 RX ADMIN — ENOXAPARIN SODIUM 70 MILLIGRAM(S): 100 INJECTION SUBCUTANEOUS at 11:46

## 2020-04-16 RX ADMIN — CHLORHEXIDINE GLUCONATE 1 APPLICATION(S): 213 SOLUTION TOPICAL at 11:42

## 2020-04-16 RX ADMIN — LISINOPRIL 5 MILLIGRAM(S): 2.5 TABLET ORAL at 09:39

## 2020-04-16 RX ADMIN — SODIUM CHLORIDE 3 MILLILITER(S): 9 INJECTION INTRAMUSCULAR; INTRAVENOUS; SUBCUTANEOUS at 05:44

## 2020-04-16 RX ADMIN — ENOXAPARIN SODIUM 70 MILLIGRAM(S): 100 INJECTION SUBCUTANEOUS at 22:02

## 2020-04-16 RX ADMIN — TAMSULOSIN HYDROCHLORIDE 0.4 MILLIGRAM(S): 0.4 CAPSULE ORAL at 23:44

## 2020-04-16 RX ADMIN — MIRTAZAPINE 7.5 MILLIGRAM(S): 45 TABLET, ORALLY DISINTEGRATING ORAL at 23:44

## 2020-04-16 RX ADMIN — Medication 25 MILLIGRAM(S): at 05:44

## 2020-04-16 RX ADMIN — SENNA PLUS 2 TABLET(S): 8.6 TABLET ORAL at 23:44

## 2020-04-16 RX ADMIN — FINASTERIDE 5 MILLIGRAM(S): 5 TABLET, FILM COATED ORAL at 11:47

## 2020-04-16 RX ADMIN — SODIUM CHLORIDE 3 MILLILITER(S): 9 INJECTION INTRAMUSCULAR; INTRAVENOUS; SUBCUTANEOUS at 14:35

## 2020-04-16 NOTE — PROGRESS NOTE ADULT - PROBLEM SELECTOR PLAN 1
COVID Isolation/ Droplet precautions  ID following  Completed Plaquenil taper  Daily CBC w/ diff, CMP & CRP  Continue to monitor O2 sats  Discharge planning: home w/ PT when stable - possibly Friday or Saturday pending INR

## 2020-04-16 NOTE — PROGRESS NOTE ADULT - SUBJECTIVE AND OBJECTIVE BOX
Subjective: Patient seen and examined resting in bed. ON no issues. States that his abdominal and back pain is improving. Denies any n/v/f/c/d.     Medications:  acetaminophen   Tablet .. 650 milliGRAM(s) Oral every 6 hours PRN  aMIOdarone    Tablet   Oral   aMIOdarone    Tablet 200 milliGRAM(s) Oral daily  aspirin enteric coated 81 milliGRAM(s) Oral daily  chlorhexidine 4% Liquid 1 Application(s) Topical <User Schedule>  enoxaparin Injectable 70 milliGRAM(s) SubCutaneous every 12 hours  famotidine    Tablet 20 milliGRAM(s) Oral daily  finasteride 5 milliGRAM(s) Oral daily  guaiFENesin   Syrup  (Sugar-Free) 100 milliGRAM(s) Oral every 6 hours PRN  lisinopril 5 milliGRAM(s) Oral daily  metoprolol succinate ER 25 milliGRAM(s) Oral daily  mirtazapine 7.5 milliGRAM(s) Oral at bedtime  polyethylene glycol 3350 17 Gram(s) Oral daily  senna 2 Tablet(s) Oral at bedtime  sodium chloride 0.9% lock flush 3 milliLiter(s) IV Push every 8 hours  tamsulosin 0.4 milliGRAM(s) Oral at bedtime  warfarin 4 milliGRAM(s) Oral once    Vitals:  Vital Signs Last 24 Hours  T(C): 36.8 (04-16-20 @ 09:30), Max: 36.9 (04-16-20 @ 04:42)  HR: 93 (04-16-20 @ 11:06) (80 - 96)  BP: 100/71 (04-16-20 @ 11:06) (82/59 - 103/70)  RR: 18 (04-16-20 @ 09:30) (18 - 18)  SpO2: 98% (04-16-20 @ 11:06) (97% - 98%)    Tele:  80-92    I&O's Summary    15 Apr 2020 07:01  -  16 Apr 2020 07:00  --------------------------------------------------------  IN: 1160 mL / OUT: 950 mL / NET: 210 mL    16 Apr 2020 07:01  -  16 Apr 2020 12:01  --------------------------------------------------------  IN: 240 mL / OUT: 0 mL / NET: 240 mL      Physical Exam:  General: No distress. Comfortable.  HEENT: EOM intact.  Neck: Neck supple. No JVP noted. No masses  Chest: Clear to auscultation bilaterally  CV: +VAD hum  Abdomen: Soft, non-distended, non-tender  Skin: No rashes or skin breakdown, driveline dressing c/d/i  Neurology: Alert and oriented times three. Sensation intact  Psych: Affect normal    LVAD Interrogation  VAD TYPE HM 2  Speed 8800  Flow 3.7  Power 4.4  PI 5  Assessment of driveline exit site: dressing c/d/i  Programming changes: no changes made      Labs:                        12.2   4.74  )-----------( 278      ( 16 Apr 2020 05:32 )             37.9     04-16    130<L>  |  97  |  30<H>  ----------------------------<  94  4.6   |  22  |  1.07    Ca    9.0      16 Apr 2020 05:30  Mg     2.1     04-16    TPro  7.6  /  Alb  3.8  /  TBili  0.4  /  DBili  <0.1  /  AST  20  /  ALT  19  /  AlkPhos  59  04-16    PT/INR - ( 16 Apr 2020 05:32 )   PT: 14.3 sec;   INR: 1.25 ratio         PTT - ( 16 Apr 2020 05:32 )  PTT:34.7 sec      Lactate Dehydrogenase, Serum: 265 U/L (04-16 @ 05:30)  Lactate Dehydrogenase, Serum: 301 U/L (04-15 @ 05:28)  Lactate Dehydrogenase, Serum: 335 U/L (04-14 @ 05:39)      Imaging Studies   < from: CT Head No Cont (04.14.20 @ 12:42) >  IMPRESSION:  No acute intracranial findings.  < end of copied text >      < from: Xray Chest 1 View- PORTABLE-Routine (04.09.20 @ 05:02) >  IMPRESSION:  New opacity at the right base. The left lung is clear.  LVAD is again noted.  < end of copied text > Subjective: Patient seen and examined resting in bed. ON no issues. States that his abdominal and back pain is improving. Denies any n/v/f/c/d.     Medications:  acetaminophen   Tablet .. 650 milliGRAM(s) Oral every 6 hours PRN  aMIOdarone    Tablet   Oral   aMIOdarone    Tablet 200 milliGRAM(s) Oral daily  aspirin enteric coated 81 milliGRAM(s) Oral daily  chlorhexidine 4% Liquid 1 Application(s) Topical <User Schedule>  enoxaparin Injectable 70 milliGRAM(s) SubCutaneous every 12 hours  famotidine    Tablet 20 milliGRAM(s) Oral daily  finasteride 5 milliGRAM(s) Oral daily  guaiFENesin   Syrup  (Sugar-Free) 100 milliGRAM(s) Oral every 6 hours PRN  lisinopril 5 milliGRAM(s) Oral daily  metoprolol succinate ER 25 milliGRAM(s) Oral daily  mirtazapine 7.5 milliGRAM(s) Oral at bedtime  polyethylene glycol 3350 17 Gram(s) Oral daily  senna 2 Tablet(s) Oral at bedtime  sodium chloride 0.9% lock flush 3 milliLiter(s) IV Push every 8 hours  tamsulosin 0.4 milliGRAM(s) Oral at bedtime  warfarin 4 milliGRAM(s) Oral once    Vitals:  Vital Signs Last 24 Hours  T(C): 36.8 (04-16-20 @ 09:30), Max: 36.9 (04-16-20 @ 04:42)  HR: 93 (04-16-20 @ 11:06) (80 - 96)  BP: 100/71 (04-16-20 @ 11:06) (82/59 - 103/70)  RR: 18 (04-16-20 @ 09:30) (18 - 18)  SpO2: 98% (04-16-20 @ 11:06) (97% - 98%)    Tele:  80-92    I&O's Summary    15 Apr 2020 07:01  -  16 Apr 2020 07:00  --------------------------------------------------------  IN: 1160 mL / OUT: 950 mL / NET: 210 mL    16 Apr 2020 07:01  -  16 Apr 2020 12:01  --------------------------------------------------------  IN: 240 mL / OUT: 0 mL / NET: 240 mL      Physical Exam:  General: No distress. Comfortable.  HEENT: EOM intact.  Neck: Neck supple. No JVP noted. No masses  Chest: Clear to auscultation bilaterally  CV: +VAD hum  Abdomen: Soft, non-distended, non-tender  Skin: No rashes or skin breakdown, driveline dressing c/d/i  Neurology: Alert and oriented times three. Sensation intact  Psych: Affect normal    LVAD Interrogation  VAD TYPE HM 2  Speed 8800  Flow 3.7  Power 4.4  PI 5  Events: occasional PI events without speed drops  Assessment of driveline exit site: dressing c/d/i  Programming changes: no changes made      Labs:                        12.2   4.74  )-----------( 278      ( 16 Apr 2020 05:32 )             37.9     04-16    130<L>  |  97  |  30<H>  ----------------------------<  94  4.6   |  22  |  1.07    Ca    9.0      16 Apr 2020 05:30  Mg     2.1     04-16    TPro  7.6  /  Alb  3.8  /  TBili  0.4  /  DBili  <0.1  /  AST  20  /  ALT  19  /  AlkPhos  59  04-16    PT/INR - ( 16 Apr 2020 05:32 )   PT: 14.3 sec;   INR: 1.25 ratio         PTT - ( 16 Apr 2020 05:32 )  PTT:34.7 sec      Lactate Dehydrogenase, Serum: 265 U/L (04-16 @ 05:30)  Lactate Dehydrogenase, Serum: 301 U/L (04-15 @ 05:28)  Lactate Dehydrogenase, Serum: 335 U/L (04-14 @ 05:39)      Imaging Studies   < from: CT Head No Cont (04.14.20 @ 12:42) >  IMPRESSION:  No acute intracranial findings.  < end of copied text >      < from: Xray Chest 1 View- PORTABLE-Routine (04.09.20 @ 05:02) >  IMPRESSION:  New opacity at the right base. The left lung is clear.  LVAD is again noted.  < end of copied text >

## 2020-04-16 NOTE — PROGRESS NOTE ADULT - ASSESSMENT
ASSESSMENT:  64/M with PMH CHF s/p LVAD Sept 2017 (on Warfarin), CAD, s/p TVR, SIADH, Depression, CKD-3, past E. coli UTIs  P/w generalized weakness since 4 days; chills since 3 days; non-specific abdominal pain and constipation since 2 days and loose stools today  H/o sick contacts with Covid-19 at home. Imaging concerning for new opacities in RUL, Covid-19 positive  =========  LVAD with Covid-19 infection  - Covid-19 Labs - ; Procalcitonin 0.45 elevated ferritin and CRP  - completed 5 day course of PO Hydroxychloroquine  - repeat CXR showed new Rt lung opacity. Pt afebrile, on RA, with no acute symptoms of cough. Would monitor off antibiotics for now- likely related to COVID pneumonia    RECOMMENDATIONS:  - Continue supplemental O2 and supportive care per primary team  - completed course of plaquenil and oxygenation is stable on RA  - Continue Contact and Airborne Isolation precautions  - clinically improving      - discharge planning    Morris Prater MD  [phone]  After 5pm/weekends [phone]

## 2020-04-16 NOTE — PROGRESS NOTE ADULT - PROBLEM SELECTOR PLAN 2
Heart Failure following  Continue ASA 81 daily   Continue AC with Coumadin - 4 mg tonight   Continue Lovenox 70 mg SQ BID until discharged - pt will not go home on Lovenox Continue Toprol XL 25 mg QD   Continue Lisinopril 5 mg PO QD   Continue to monitor daily labs CBC/CMP/INR/LDH

## 2020-04-16 NOTE — PROGRESS NOTE ADULT - ASSESSMENT
63 y/o male PMH ACC/AHA stage D HF due to NICM HM2 LVAD, TV annuloplasty ring 9/12/17 as destination therapy due to severe peripheral artery disease with significant stenosis, SIADH, Depression, CKD-3, with hyperkalemia past E. coli UTIs. Today he reports generalized weakness since 4 days, chills since 3 days, non-specific abdominal pain and constipation since 2 days with loose stools today.  Reports +Covid-19 sick contacts at home. Denies cough, difficulty breathing, chest pain, palpitations. Directed to [facility] ED r/o COVID. Seen by ID -BCx & urine Cx sent, COVID 19 PCR positive- started on Plaquenil, cefepime and flagyl. Renal function improved with volume resuscitation, home BP medications currently on hold.   4/2: VSS, Per ID d/c cefepime and flagyl today. Continue Plaquenil taper. INR 4.27 holding Coumadin tonight, Of note overnight patient had two febrile episodes, Tylenol was given. MAPs went down to 30, IV fluid was given and MAPs improved.   4/3 temp overnight max 101.5- Tylenol prn; wbc 3; CRP 5.6 today; continue to monitor closely on Plaquenil.  MAP's improved today 70's- IV fluid 500 cc normal saline as per Dr. Iyer   continue to monitor O2 sats: 98% RA ; anticoagulation with coumadin INR 3.08- coumadin 2 mg this evening  4/4 VSS ; tmax 100.9 overnight;  MAP 80's; resume bph meds today and d/c aleman sun/ mon  anticoagulation with coumadin INR 2.0- coumadin 2.5 mg this evening;  today, CRP 10.1- Plaquenil taper as per ID, continue to monitor closely   4/5 VSS, complete Plaquenil taper tonight, RA O2 Sat 96%, INR 2.76, Coumadin 2mg per HF. Aleman d/c'd today. Possible d/c home Monday if stable.   4/6 VSS, pt voiding. MAPs>90, resumed back on lisinopril 10 daily, also 500ml NS bolus per HF. INR 4.12, Coumadin on hold tonight.   4/7: VSS, MAPs 68-88. Increased Lisinopril to 10 mg daily, gave 50cc NS bolus and started fluids at 75cc/hr for total of 500cc per HF. Coumadin on hold tonight for INR 4.88. Ensure started to supplement PO intake per nutrition recs.   4/8 INR 4.12 held coumadin ,, dc lisinopril for elevated creatinine  MAPS  60  4/9 MAP 72 amio load for VT, CRP 8.22, creat down to 1.34  4/10: VSS, afebrile overnight, O2 sat 95% on RA, MAPs 72-74, negative 720cc fluid balance. CRP trending down, 5.19 today. . INR 3.02 --> Coumadin held. K 5.7 this AM --> repeat K 5.2. Aleman removed today --> TOV   4/11 VVS; CHF following --> Lasix 40 IV x 1 for volume overload.  Supra-therapeutic INR 3.32 --> Continue to hold coumadin. CRP trending down   4/12   SR  100   INR  2.08   dosed coumadin 1 mg,  02 ssat 92 percent   VSS   Labs stable , on [id number]  4/13: VSS, O2 sat 94-96% on RA, afebrile, MAPs 85-88. INR 1.4 --> Coumadin 2 mg tonight per heart failure team. Last BM 4/7 --> currently on Senna daily, will start Miralax daily - 1st dose stat and give Dulcolax suppository x 1. Per heart failure team, start Lovenox 50 mg SQ BID, Toprol XL 25 mg QD and Lasix 20 mg QD today. If pt tolerates Toprol XL will start Lisinopril 5 mg QD tomorrow.   4/14: VSS, O2 sat % on RA, afebrile, MAPs 80-85. INR 1.26 --> will give Coumadin 2 mg tonight per heart failure team. Per heart failure team, pt had a 30 second episode of unresponsiveness earlier today that resolved spontaneously - CT head was negative for acute intracranial findings. Will continue to monitor. Lisinopril 5 mg PO QD started today, Lasix d/c'd, 500cc bolus NS given and 1.5 L free water restriction initiated given Na 127 per heart failure team.   4/15 Pt  with rising creat , n S/S fluid overload, given 500 cc ns over 5 hours. Coumadin. Head CT from 4/14 neg - no acute intracranial findings, no AMS today.    4/16: VSS, MAPs 75-82, O2 sat 96-98% on RA, afebrile overnight. CRP trending down, 0.29 today. INR 1.25 --> will give Coumadin 4 mg tonight per heart failure team. Continue current medication regimen as per heart failure team.   Discharge planning: home w/ PT when stable - possibly Friday or Saturday pending INR 63 y/o male PMH ACC/AHA stage D HF due to NICM HM2 LVAD, TV annuloplasty ring 9/12/17 as destination therapy due to severe peripheral artery disease with significant stenosis, SIADH, Depression, CKD-3, with hyperkalemia past E. coli UTIs. Today he reports generalized weakness since 4 days, chills since 3 days, non-specific abdominal pain and constipation since 2 days with loose stools today.  Reports +Covid-19 sick contacts at home. Denies cough, difficulty breathing, chest pain, palpitations. Directed to [facility] ED r/o COVID. Seen by ID -BCx & urine Cx sent, COVID 19 PCR positive- started on Plaquenil, cefepime and flagyl. Renal function improved with volume resuscitation, home BP medications currently on hold.   4/2: VSS, Per ID d/c cefepime and flagyl today. Continue Plaquenil taper. INR 4.27 holding Coumadin tonight, Of note overnight patient had two febrile episodes, Tylenol was given. MAPs went down to 30, IV fluid was given and MAPs improved.   4/3 temp overnight max 101.5- Tylenol prn; wbc 3; CRP 5.6 today; continue to monitor closely on Plaquenil.  MAP's improved today 70's- IV fluid 500 cc normal saline as per Dr. Iyer   continue to monitor O2 sats: 98% RA ; anticoagulation with coumadin INR 3.08- coumadin 2 mg this evening  4/4 VSS ; tmax 100.9 overnight;  MAP 80's; resume bph meds today and d/c aleman sun/ mon  anticoagulation with coumadin INR 2.0- coumadin 2.5 mg this evening;  today, CRP 10.1- Plaquenil taper as per ID, continue to monitor closely   4/5 VSS, complete Plaquenil taper tonight, RA O2 Sat 96%, INR 2.76, Coumadin 2mg per HF. Aleman d/c'd today. Possible d/c home Monday if stable.   4/6 VSS, pt voiding. MAPs>90, resumed back on lisinopril 10 daily, also 500ml NS bolus per HF. INR 4.12, Coumadin on hold tonight.   4/7: VSS, MAPs 68-88. Increased Lisinopril to 10 mg daily, gave 50cc NS bolus and started fluids at 75cc/hr for total of 500cc per HF. Coumadin on hold tonight for INR 4.88. Ensure started to supplement PO intake per nutrition recs.   4/8 INR 4.12 held coumadin ,, dc lisinopril for elevated creatinine  MAPS  60  4/9 MAP 72 amio load for VT, CRP 8.22, creat down to 1.34  4/10: VSS, afebrile overnight, O2 sat 95% on RA, MAPs 72-74, negative 720cc fluid balance. CRP trending down, 5.19 today. . INR 3.02 --> Coumadin held. K 5.7 this AM --> repeat K 5.2. Aleman removed today --> TOV   4/11 VVS; CHF following --> Lasix 40 IV x 1 for volume overload.  Supra-therapeutic INR 3.32 --> Continue to hold coumadin. CRP trending down   4/12   SR  100   INR  2.08   dosed coumadin 1 mg,  02 ssat 92 percent   VSS   Labs stable , on [id number]  4/13: VSS, O2 sat 94-96% on RA, afebrile, MAPs 85-88. INR 1.4 --> Coumadin 2 mg tonight per heart failure team. Last BM 4/7 --> currently on Senna daily, will start Miralax daily - 1st dose stat and give Dulcolax suppository x 1. Per heart failure team, start Lovenox 50 mg SQ BID, Toprol XL 25 mg QD and Lasix 20 mg QD today. If pt tolerates Toprol XL will start Lisinopril 5 mg QD tomorrow.   4/14: VSS, O2 sat % on RA, afebrile, MAPs 80-85. INR 1.26 --> will give Coumadin 2 mg tonight per heart failure team. Per heart failure team, pt had a 30 second episode of unresponsiveness earlier today that resolved spontaneously - CT head was negative for acute intracranial findings. Will continue to monitor. Lisinopril 5 mg PO QD started today, Lasix d/c'd, 500cc bolus NS given and 1.5 L free water restriction initiated given Na 127 per heart failure team.   4/15 Pt  with rising creat , n S/S fluid overload, given 500 cc ns over 5 hours. Coumadin. Head CT from 4/14 neg - no acute intracranial findings, no AMS today.    4/16: VSS, MAPs 75-82, O2 sat 96-98% on RA, afebrile overnight. CRP trending down, 0.29 today. INR 1.25 --> will give Coumadin 4 mg tonight per heart failure team. Continue current medication regimen as per heart failure team.   Discharge planning: home w/ PT friday on lvx bid

## 2020-04-16 NOTE — PROGRESS NOTE ADULT - ASSESSMENT
64 year old man with ACC/AHA stage D HF due to NICM, s/p HM2 LVAD  in 9/12/17 as destination therapy due to severe peripheral artery disease with significant stenosis. He currently presents with COVID-19 accompanied by abdominal pain, diarrhea and acute renal failure. He completed a 5 day course of Plaquenil on 4/5, remains afebrile. Currently voiding freely, however did experience urinary rentention on 4/6 which required aleman to be reinserted. Currently on Lisinopril and  Metoprolol Succinate for better blood pressure control. INR remains subtherapeutic, continue Lovenox 70 mg BID while patient remains in house. RASHAWN now resolved.  Plan to discharge home pending elevated INR, hopefully 1-2 days.

## 2020-04-16 NOTE — PROGRESS NOTE ADULT - PROBLEM SELECTOR PLAN 3
- Countinue Coumadin for LVAD, will dose 4 mg tonight; INR at goal 2-2.5  - Continue Lovenox 70 mg BID while in house for subtherapeutic INR 1.25 today, of note patient will not be discharged home on Lovenox  - Continue ASA  - Monitor MAPs, goal 70-80s

## 2020-04-16 NOTE — PROGRESS NOTE ADULT - ATTENDING COMMENTS
65 YO M with a history of ACC/AHA stage D HF due to NICM s/p HM2 LVAD on 9/12/17 as destination therapy (severe PAD) who was admitted with 4/2 with weakness, chills, and abdominal pain and found to have COVID-19 infection. He has never required supplemental O2 and completed a 5 day course of plaquinel and has downtrending inflammatory markers. Course notable for VT 4/9 for which he was started on amiodarone and RASHAWN due to hypovolemia which has resolved. Course also notable for transient episode of AMS 4/14 with CT head normal and now resolved. He is nearing discharge but has a subtherapeutic INR  - continue metoprolol succinate 25 mg daily and lisinopril 5 mg daily, MAP at goal  - continue to hold lasix, appears euvolemic and renal function has normalized  - continue coumadin, goal INR 2-3. continue ASA.  - therapeutic lovenox in house for bridging (avoiding heparin to avoid increased patient encounters for PTT monitoring given +COVID) and per VAD team he would be poor candidate for outpatient bridging  - discharge when INR uptrending

## 2020-04-16 NOTE — PROGRESS NOTE ADULT - SUBJECTIVE AND OBJECTIVE BOX
Telemetry: NSR 80s-90s     Vital Signs Last 24 Hrs  T(C): 36.3 (04-16-20 @ 12:55), Max: 36.9 (04-16-20 @ 04:42)  T(F): 97.4 (04-16-20 @ 12:55), Max: 98.4 (04-16-20 @ 04:42)  HR: 85 (04-16-20 @ 12:55) (80 - 96)  BP: 97/66 (04-16-20 @ 12:55) (97/66 - 103/70)  RR: 17 (04-16-20 @ 12:55) (17 - 18)  SpO2: 98% (04-16-20 @ 12:55) (98% - 98%)             04-15 @ 07:01  -  04-16 @ 07:00  --------------------------------------------------------  IN: 1160 mL / OUT: 950 mL / NET: 210 mL    04-16 @ 07:01  -  04-16 @ 15:48  --------------------------------------------------------  IN: 240 mL / OUT: 0 mL / NET: 240 mL      Coumadin    [ x ] YES          [  ] NO         Reason: LVAD                              12.2   4.74  )-----------( 278      ( 16 Apr 2020 05:32 )             37.9     04-16    130<L>  |  97  |  30<H>  ----------------------------<  94  4.6   |  22  |  1.07    Ca    9.0      16 Apr 2020 05:30  Mg     2.1     04-16    TPro  7.6  /  Alb  3.8  /  TBili  0.4  /  DBili  <0.1  /  AST  20  /  ALT  19  /  AlkPhos  59  04-16    PT/INR - ( 16 Apr 2020 05:32 )   PT: 14.3 sec;   INR: 1.25 ratio       PTT - ( 16 Apr 2020 05:32 )  PTT:34.7 sec      PHYSICAL EXAM: per heart failure progress note from today 4/16      acetaminophen Tablet 650 milliGRAM(s) Oral every 6 hours PRN  aMIOdarone Tablet Oral   aMIOdarone Tablet 200 milliGRAM(s) Oral daily  aspirin enteric coated 81 milliGRAM(s) Oral daily  chlorhexidine 4% Liquid 1 Application(s) Topical <User Schedule>  enoxaparin Injectable 70 milliGRAM(s) SubCutaneous every 12 hours  famotidine Tablet 20 milliGRAM(s) Oral daily  finasteride 5 milliGRAM(s) Oral daily  guaiFENesin Syrup  (Sugar-Free) 100 milliGRAM(s) Oral every 6 hours PRN  lisinopril 5 milliGRAM(s) Oral daily  metoprolol succinate ER 25 milliGRAM(s) Oral daily  mirtazapine 7.5 milliGRAM(s) Oral at bedtime  polyethylene glycol 3350 17 Gram(s) Oral daily  senna 2 Tablet(s) Oral at bedtime  sodium chloride 0.9% lock flush 3 milliLiter(s) IV Push every 8 hours  tamsulosin 0.4 milliGRAM(s) Oral at bedtime  warfarin 4 milliGRAM(s) Oral once       Physical Therapy Rec:   Home  [  ]   Home w/ PT  [ x ]  Rehab  [  ]    Discussed with Cardiothoracic Team at AM rounds.

## 2020-04-16 NOTE — PROGRESS NOTE ADULT - REASON FOR ADMISSION
[facility] Neurology Office Note      Patient:   Nando Beltran  MR#:    [id number]  Account Number:                         YOB: 2011  Date of Evaluation:  2/18/2020  Time of Note:                          3:49 PM  Primary/Referring Physician:  Maira Cavanaugh PA-C   Consulting Physician:  Liv Delarosa DO    [address]     [place]le [place]    Seizures [facility])        Girma Butts DO  Board Certified Neurology Nausea/

## 2020-04-16 NOTE — PROGRESS NOTE ADULT - SUBJECTIVE AND OBJECTIVE BOX
INFECTIOUS DISEASES FOLLOW UP-- Anitra Prater  [phone]    This is a follow up note for this  64yMale with  Shortness of breath, feeling better today, no new complaints      ROS:  CONSTITUTIONAL:  No fever, good appetite  CARDIOVASCULAR:  No chest pain or palpitations  RESPIRATORY:  No dyspnea  GASTROINTESTINAL:  No nausea, vomiting, diarrhea, or abdominal pain  GENITOURINARY:  No dysuria  NEUROLOGIC:  No headache,     Allergies    No Known Allergies    Intolerances        ANTIBIOTICS/RELEVANT:  antimicrobials    immunologic:    OTHER:  acetaminophen   Tablet .. 650 milliGRAM(s) Oral every 6 hours PRN  aMIOdarone    Tablet   Oral   aMIOdarone    Tablet 200 milliGRAM(s) Oral daily  aspirin enteric coated 81 milliGRAM(s) Oral daily  chlorhexidine 4% Liquid 1 Application(s) Topical <User Schedule>  enoxaparin Injectable 70 milliGRAM(s) SubCutaneous every 12 hours  famotidine    Tablet 20 milliGRAM(s) Oral daily  finasteride 5 milliGRAM(s) Oral daily  guaiFENesin   Syrup  (Sugar-Free) 100 milliGRAM(s) Oral every 6 hours PRN  lisinopril 5 milliGRAM(s) Oral daily  metoprolol succinate ER 25 milliGRAM(s) Oral daily  mirtazapine 7.5 milliGRAM(s) Oral at bedtime  polyethylene glycol 3350 17 Gram(s) Oral daily  senna 2 Tablet(s) Oral at bedtime  sodium chloride 0.9% lock flush 3 milliLiter(s) IV Push every 8 hours  tamsulosin 0.4 milliGRAM(s) Oral at bedtime  warfarin 4 milliGRAM(s) Oral once      Objective:  Vital Signs Last 24 Hrs  T(C): 36.6 (16 Apr 2020 17:05), Max: 36.9 (16 Apr 2020 04:42)  T(F): 97.8 (16 Apr 2020 17:05), Max: 98.4 (16 Apr 2020 04:42)  HR: 80 (16 Apr 2020 17:05) (80 - 96)  BP: 88/61 (16 Apr 2020 17:05) (88/61 - 103/70)  BP(mean): 70 (16 Apr 2020 17:05) (70 - 82)  RR: 17 (16 Apr 2020 17:05) (17 - 18)  SpO2: 98% (16 Apr 2020 17:05) (98% - 98%)    PHYSICAL EXAM:  Constitutional:no acute distress  Eyes:ALONSO, EOMI  Ear/Nose/Throat: no oral lesions, 	  Respiratory: clear BL   Cardiovascular: LVAD sounds  Gastrointestinal:soft, (+) BS, no tenderness  Extremities:no e/e/c  No Lymphadenopathy  IV sites not inflammed.    LABS:                        12.2   4.74  )-----------( 278      ( 16 Apr 2020 05:32 )             37.9     04-16    130<L>  |  97  |  30<H>  ----------------------------<  94  4.6   |  22  |  1.07    Ca    9.0      16 Apr 2020 05:30  Mg     2.1     04-16    TPro  7.6  /  Alb  3.8  /  TBili  0.4  /  DBili  <0.1  /  AST  20  /  ALT  19  /  AlkPhos  59  04-16    PT/INR - ( 16 Apr 2020 05:32 )   PT: 14.3 sec;   INR: 1.25 ratio         PTT - ( 16 Apr 2020 05:32 )  PTT:34.7 sec      MICROBIOLOGY:    no new cultures        RECENT CULTURES:      RADIOLOGY & ADDITIONAL STUDIES:

## 2020-04-17 LAB
ALBUMIN SERPL ELPH-MCNC: 3.7 G/DL — SIGNIFICANT CHANGE UP (ref 3.3–5)
ALP SERPL-CCNC: 63 U/L — SIGNIFICANT CHANGE UP (ref 40–120)
ALT FLD-CCNC: 27 U/L — SIGNIFICANT CHANGE UP (ref 10–45)
ANION GAP SERPL CALC-SCNC: 11 MMOL/L — SIGNIFICANT CHANGE UP (ref 5–17)
APTT BLD: 31.3 SEC — SIGNIFICANT CHANGE UP (ref 27.5–36.3)
AST SERPL-CCNC: 30 U/L — SIGNIFICANT CHANGE UP (ref 10–40)
BASOPHILS # BLD AUTO: 0.05 K/UL — SIGNIFICANT CHANGE UP (ref 0–0.2)
BASOPHILS NFR BLD AUTO: 1.4 % — SIGNIFICANT CHANGE UP (ref 0–2)
BILIRUB DIRECT SERPL-MCNC: <0.1 MG/DL — SIGNIFICANT CHANGE UP (ref 0–0.2)
BILIRUB INDIRECT FLD-MCNC: >0.1 MG/DL — LOW (ref 0.2–1)
BILIRUB SERPL-MCNC: 0.2 MG/DL — SIGNIFICANT CHANGE UP (ref 0.2–1.2)
BUN SERPL-MCNC: 31 MG/DL — HIGH (ref 7–23)
CALCIUM SERPL-MCNC: 9.4 MG/DL — SIGNIFICANT CHANGE UP (ref 8.4–10.5)
CHLORIDE SERPL-SCNC: 98 MMOL/L — SIGNIFICANT CHANGE UP (ref 96–108)
CO2 SERPL-SCNC: 21 MMOL/L — LOW (ref 22–31)
CREAT SERPL-MCNC: 1.1 MG/DL — SIGNIFICANT CHANGE UP (ref 0.5–1.3)
CRP SERPL-MCNC: 0.23 MG/DL — SIGNIFICANT CHANGE UP (ref 0–0.4)
D DIMER BLD IA.RAPID-MCNC: 1100 NG/ML DDU — HIGH
EOSINOPHIL # BLD AUTO: 0.1 K/UL — SIGNIFICANT CHANGE UP (ref 0–0.5)
EOSINOPHIL NFR BLD AUTO: 2.7 % — SIGNIFICANT CHANGE UP (ref 0–6)
GLUCOSE SERPL-MCNC: 102 MG/DL — HIGH (ref 70–99)
HCT VFR BLD CALC: 38.5 % — LOW (ref 39–50)
HGB BLD-MCNC: 12.4 G/DL — LOW (ref 13–17)
IMM GRANULOCYTES NFR BLD AUTO: 1.1 % — SIGNIFICANT CHANGE UP (ref 0–1.5)
INR BLD: 1.2 RATIO — HIGH (ref 0.88–1.16)
LDH SERPL L TO P-CCNC: 282 U/L — HIGH (ref 50–242)
LYMPHOCYTES # BLD AUTO: 1.13 K/UL — SIGNIFICANT CHANGE UP (ref 1–3.3)
LYMPHOCYTES # BLD AUTO: 30.7 % — SIGNIFICANT CHANGE UP (ref 13–44)
MAGNESIUM SERPL-MCNC: 2 MG/DL — SIGNIFICANT CHANGE UP (ref 1.6–2.6)
MCHC RBC-ENTMCNC: 28.2 PG — SIGNIFICANT CHANGE UP (ref 27–34)
MCHC RBC-ENTMCNC: 32.2 GM/DL — SIGNIFICANT CHANGE UP (ref 32–36)
MCV RBC AUTO: 87.7 FL — SIGNIFICANT CHANGE UP (ref 80–100)
MONOCYTES # BLD AUTO: 0.58 K/UL — SIGNIFICANT CHANGE UP (ref 0–0.9)
MONOCYTES NFR BLD AUTO: 15.8 % — HIGH (ref 2–14)
NEUTROPHILS # BLD AUTO: 1.78 K/UL — LOW (ref 1.8–7.4)
NEUTROPHILS NFR BLD AUTO: 48.3 % — SIGNIFICANT CHANGE UP (ref 43–77)
NRBC # BLD: 0 /100 WBCS — SIGNIFICANT CHANGE UP (ref 0–0)
PLATELET # BLD AUTO: 255 K/UL — SIGNIFICANT CHANGE UP (ref 150–400)
POTASSIUM SERPL-MCNC: 4.6 MMOL/L — SIGNIFICANT CHANGE UP (ref 3.5–5.3)
POTASSIUM SERPL-SCNC: 4.6 MMOL/L — SIGNIFICANT CHANGE UP (ref 3.5–5.3)
PROT SERPL-MCNC: 8.2 G/DL — SIGNIFICANT CHANGE UP (ref 6–8.3)
PROTHROM AB SERPL-ACNC: 13.8 SEC — HIGH (ref 10–12.9)
RBC # BLD: 4.39 M/UL — SIGNIFICANT CHANGE UP (ref 4.2–5.8)
RBC # FLD: 13.5 % — SIGNIFICANT CHANGE UP (ref 10.3–14.5)
SODIUM SERPL-SCNC: 130 MMOL/L — LOW (ref 135–145)
WBC # BLD: 3.68 K/UL — LOW (ref 3.8–10.5)
WBC # FLD AUTO: 3.68 K/UL — LOW (ref 3.8–10.5)

## 2020-04-17 PROCEDURE — 93750 INTERROGATION VAD IN PERSON: CPT

## 2020-04-17 PROCEDURE — 99232 SBSQ HOSP IP/OBS MODERATE 35: CPT

## 2020-04-17 PROCEDURE — 99233 SBSQ HOSP IP/OBS HIGH 50: CPT | Mod: 25

## 2020-04-17 RX ORDER — WARFARIN SODIUM 2.5 MG/1
5 TABLET ORAL ONCE
Refills: 0 | Status: COMPLETED | OUTPATIENT
Start: 2020-04-17 | End: 2020-04-17

## 2020-04-17 RX ADMIN — SODIUM CHLORIDE 3 MILLILITER(S): 9 INJECTION INTRAMUSCULAR; INTRAVENOUS; SUBCUTANEOUS at 20:57

## 2020-04-17 RX ADMIN — CHLORHEXIDINE GLUCONATE 1 APPLICATION(S): 213 SOLUTION TOPICAL at 08:18

## 2020-04-17 RX ADMIN — Medication 25 MILLIGRAM(S): at 06:53

## 2020-04-17 RX ADMIN — SODIUM CHLORIDE 3 MILLILITER(S): 9 INJECTION INTRAMUSCULAR; INTRAVENOUS; SUBCUTANEOUS at 10:19

## 2020-04-17 RX ADMIN — ENOXAPARIN SODIUM 70 MILLIGRAM(S): 100 INJECTION SUBCUTANEOUS at 21:12

## 2020-04-17 RX ADMIN — FAMOTIDINE 20 MILLIGRAM(S): 10 INJECTION INTRAVENOUS at 10:19

## 2020-04-17 RX ADMIN — ENOXAPARIN SODIUM 70 MILLIGRAM(S): 100 INJECTION SUBCUTANEOUS at 10:18

## 2020-04-17 RX ADMIN — TAMSULOSIN HYDROCHLORIDE 0.4 MILLIGRAM(S): 0.4 CAPSULE ORAL at 21:13

## 2020-04-17 RX ADMIN — LISINOPRIL 5 MILLIGRAM(S): 2.5 TABLET ORAL at 10:19

## 2020-04-17 RX ADMIN — SODIUM CHLORIDE 3 MILLILITER(S): 9 INJECTION INTRAMUSCULAR; INTRAVENOUS; SUBCUTANEOUS at 06:48

## 2020-04-17 RX ADMIN — Medication 81 MILLIGRAM(S): at 10:18

## 2020-04-17 RX ADMIN — FINASTERIDE 5 MILLIGRAM(S): 5 TABLET, FILM COATED ORAL at 10:19

## 2020-04-17 RX ADMIN — SENNA PLUS 2 TABLET(S): 8.6 TABLET ORAL at 21:13

## 2020-04-17 RX ADMIN — MIRTAZAPINE 7.5 MILLIGRAM(S): 45 TABLET, ORALLY DISINTEGRATING ORAL at 21:13

## 2020-04-17 RX ADMIN — AMIODARONE HYDROCHLORIDE 200 MILLIGRAM(S): 400 TABLET ORAL at 06:54

## 2020-04-17 RX ADMIN — WARFARIN SODIUM 5 MILLIGRAM(S): 2.5 TABLET ORAL at 21:13

## 2020-04-17 RX ADMIN — Medication 650 MILLIGRAM(S): at 08:29

## 2020-04-17 NOTE — PROGRESS NOTE ADULT - ATTENDING COMMENTS
65 YO M with a history of ACC/AHA stage D HF due to NICM s/p HM2 LVAD on 9/12/17 as destination therapy (severe PAD) who was admitted with 4/2 with weakness, chills, and abdominal pain and found to have COVID-19 infection. He has never required supplemental O2 and completed a 5 day course of plaquinel and has downtrending inflammatory markers. Course notable for VT 4/9 for which he was started on amiodarone and RASHAWN due to hypovolemia which has resolved. Course also notable for transient episode of AMS 4/14 with CT head normal and now resolved. He is nearing discharge but has a subtherapeutic INR  - continue metoprolol succinate 25 mg daily and lisinopril 5 mg daily, MAP at goal  - continue to hold lasix, appears euvolemic and renal function has normalized  - increase coumadin, goal INR 2-3. continue ASA.  - therapeutic lovenox in house for bridging (avoiding heparin to avoid increased patient encounters for PTT monitoring given +COVID) and per VAD team he would be poor candidate for outpatient bridging  - discharge when INR uptrending .

## 2020-04-17 NOTE — PROGRESS NOTE ADULT - ASSESSMENT
65 y/o male PMH ACC/AHA stage D HF due to NICM HM2 LVAD, TV annuloplasty ring 9/12/17 as destination therapy due to severe peripheral artery disease with significant stenosis, SIADH, Depression, CKD-3, with hyperkalemia past E. coli UTIs. Today he reports generalized weakness since 4 days, chills since 3 days, non-specific abdominal pain and constipation since 2 days with loose stools today.  Reports +Covid-19 sick contacts at home. Denies cough, difficulty breathing, chest pain, palpitations. Directed to [facility] ED r/o COVID. Seen by ID -BCx & urine Cx sent, COVID 19 PCR positive- started on Plaquenil, cefepime and flagyl. Renal function improved with volume resuscitation, home BP medications currently on hold.   4/2: VSS, Per ID d/c cefepime and flagyl today. Continue Plaquenil taper. INR 4.27 holding Coumadin tonight, Of note overnight patient had two febrile episodes, Tylenol was given. MAPs went down to 30, IV fluid was given and MAPs improved.   4/3 temp overnight max 101.5- Tylenol prn; wbc 3; CRP 5.6 today; continue to monitor closely on Plaquenil.  MAP's improved today 70's- IV fluid 500 cc normal saline as per Dr. Iyer   continue to monitor O2 sats: 98% RA ; anticoagulation with coumadin INR 3.08- coumadin 2 mg this evening  4/4 VSS ; tmax 100.9 overnight;  MAP 80's; resume bph meds today and d/c aleman sun/ mon  anticoagulation with coumadin INR 2.0- coumadin 2.5 mg this evening;  today, CRP 10.1- Plaquenil taper as per ID, continue to monitor closely   4/5 VSS, complete Plaquenil taper tonight, RA O2 Sat 96%, INR 2.76, Coumadin 2mg per HF. Aleman d/c'd today. Possible d/c home Monday if stable.   4/6 VSS, pt voiding. MAPs>90, resumed back on lisinopril 10 daily, also 500ml NS bolus per HF. INR 4.12, Coumadin on hold tonight.   4/7: VSS, MAPs 68-88. Increased Lisinopril to 10 mg daily, gave 50cc NS bolus and started fluids at 75cc/hr for total of 500cc per HF. Coumadin on hold tonight for INR 4.88. Ensure started to supplement PO intake per nutrition recs.   4/8 INR 4.12 held coumadin ,, dc lisinopril for elevated creatinine  MAPS  60  4/9 MAP 72 amio load for VT, CRP 8.22, creat down to 1.34  4/10: VSS, afebrile overnight, O2 sat 95% on RA, MAPs 72-74, negative 720cc fluid balance. CRP trending down, 5.19 today. . INR 3.02 --> Coumadin held. K 5.7 this AM --> repeat K 5.2. Aleman removed today --> TOV   4/11 VVS; CHF following --> Lasix 40 IV x 1 for volume overload.  Supra-therapeutic INR 3.32 --> Continue to hold coumadin. CRP trending down   4/12   SR  100   INR  2.08   dosed coumadin 1 mg,  02 ssat 92 percent   VSS   Labs stable , on [id number]  4/13: VSS, O2 sat 94-96% on RA, afebrile, MAPs 85-88. INR 1.4 --> Coumadin 2 mg tonight per heart failure team. Last BM 4/7 --> currently on Senna daily, will start Miralax daily - 1st dose stat and give Dulcolax suppository x 1. Per heart failure team, start Lovenox 50 mg SQ BID, Toprol XL 25 mg QD and Lasix 20 mg QD today. If pt tolerates Toprol XL will start Lisinopril 5 mg QD tomorrow.   4/14: VSS, O2 sat % on RA, afebrile, MAPs 80-85. INR 1.26 --> will give Coumadin 2 mg tonight per heart failure team. Per heart failure team, pt had a 30 second episode of unresponsiveness earlier today that resolved spontaneously - CT head was negative for acute intracranial findings. Will continue to monitor. Lisinopril 5 mg PO QD started today, Lasix d/c'd, 500cc bolus NS given and 1.5 L free water restriction initiated given Na 127 per heart failure team.   4/15 Pt  with rising creat , n S/S fluid overload, given 500 cc ns over 5 hours. Coumadin. Head CT from 4/14 neg - no acute intracranial findings, no AMS today.    4/16: VSS, MAPs 75-82, O2 sat 96-98% on RA, afebrile overnight. CRP trending down, 0.29 today. INR 1.25 --> will give Coumadin 4 mg tonight per heart failure team. Continue current medication regimen as per heart failure team.   4/17  Discharge cancelled secondary to subtherapeutic INR. Coumadin 5mg dosed tonight  Discharge planning: home w/ PT when INR therapeutic

## 2020-04-17 NOTE — PROGRESS NOTE ADULT - SUBJECTIVE AND OBJECTIVE BOX
VITAL SIGNS    Telemetry:  SR 60-90  Vital Signs Last 24 Hrs  T(C): 36.3 (20 @ 10:08), Max: 36.7 (20 @ 05:38)  T(F): 97.4 (20 @ 10:08), Max: 98.1 (20 @ 05:38)  HR: 85 (20 @ 10:08) (80 - 90)  BP: 84/63 (20 @ 10:08) (84/63 - 98/64)  RR: 18 (20 @ 10:08) (16 - 18)  SpO2: 98% (20 @ 10:08) (97% - 98%)             @ 07:01  -   @ 07:00  --------------------------------------------------------  IN: 480 mL / OUT: 400 mL / NET: 80 mL     @ 07:01  -   @ 12:49  --------------------------------------------------------  IN: 0 mL / OUT: 200 mL / NET: -200 mL       Daily     Daily Weight in k.8 (2020 09:54)  Admit Wt: Drug Dosing Weight  Height (cm): 177.8 (31 Mar 2020 13:04)  Weight (kg): 52.2 (31 Mar 2020 13:04)  BMI (kg/m2): 16.5 (31 Mar 2020 13:04)  BSA (m2): 1.65 (31 Mar 2020 13:04)    Bilirubin Total, Serum: 0.2 mg/dL ( @ 05:51)  Bilirubin Direct, Serum: <0.1 mg/dL ( 05:51)    CAPILLARY BLOOD GLUCOSE              MEDICATIONS  acetaminophen   Tablet .. 650 milliGRAM(s) Oral every 6 hours PRN  aMIOdarone    Tablet   Oral   aMIOdarone    Tablet 200 milliGRAM(s) Oral daily  aspirin enteric coated 81 milliGRAM(s) Oral daily  chlorhexidine 4% Liquid 1 Application(s) Topical <User Schedule>  enoxaparin Injectable 70 milliGRAM(s) SubCutaneous every 12 hours  famotidine    Tablet 20 milliGRAM(s) Oral daily  finasteride 5 milliGRAM(s) Oral daily  guaiFENesin   Syrup  (Sugar-Free) 100 milliGRAM(s) Oral every 6 hours PRN  lisinopril 5 milliGRAM(s) Oral daily  metoprolol succinate ER 25 milliGRAM(s) Oral daily  mirtazapine 7.5 milliGRAM(s) Oral at bedtime  polyethylene glycol 3350 17 Gram(s) Oral daily  senna 2 Tablet(s) Oral at bedtime  sodium chloride 0.9% lock flush 3 milliLiter(s) IV Push every 8 hours  tamsulosin 0.4 milliGRAM(s) Oral at bedtime  warfarin 5 milliGRAM(s) Oral once      >>> <<<  PHYSICAL EXAM  deferred      LABS      130<L>  |  98  |  31<H>  ----------------------------<  102<H>  4.6   |  21<L>  |  1.10    Ca    9.4      2020 05:51  Mg     2.0         TPro  8.2  /  Alb  3.7  /  TBili  0.2  /  DBili  <0.1  /  AST  30  /  ALT  27  /  AlkPhos  63                                   12.4   3.68  )-----------( 255      ( 2020 05:51 )             38.5          PT/INR - ( 2020 05:51 )   PT: 13.8 sec;   INR: 1.20 ratio         PTT - ( 2020 05:51 )  PTT:31.3 sec       PAST MEDICAL & SURGICAL HISTORY:  Pleural effusion  Depression  CAD (coronary artery disease)  CHF (congestive heart failure)  S/P ventricular assist device  S/P TVR (tricuspid valve repair)

## 2020-04-17 NOTE — PROGRESS NOTE ADULT - PROBLEM SELECTOR PLAN 5
- Noted to be in VT lasting >1 min on 4/7 with spontaneous conversion  - Continue Amiodarone 200 mg daily (initiated 4/7)

## 2020-04-17 NOTE — PROGRESS NOTE ADULT - PROBLEM SELECTOR PLAN 3
- Coumadin 5mg tonight (goal INR 2-2.5) and continue with Lovenox bridge  - Continue ASA  - Monitor MAPs, goal 70-80s

## 2020-04-17 NOTE — PROGRESS NOTE ADULT - ASSESSMENT
ASSESSMENT:  64/M with PMH CHF s/p LVAD Sept 2017 (on Warfarin), CAD, s/p TVR, SIADH, Depression, CKD-3, past E. coli UTIs  P/w generalized weakness since 4 days; chills since 3 days; non-specific abdominal pain and constipation since 2 days and loose stools today  H/o sick contacts with Covid-19 at home. Imaging concerning for new opacities in RUL, Covid-19 positive  =========  LVAD with Covid-19 infection  - Covid-19 Labs - ; Procalcitonin 0.45 elevated ferritin and CRP  - completed 5 day course of PO Hydroxychloroquine  - repeat CXR showed new Rt lung opacity. Pt afebrile, on RA, with no acute symptoms of cough. Would monitor off antibiotics for now- likely related to COVID pneumonia    RECOMMENDATIONS:  - Continue supplemental O2 and supportive care per primary team  - completed course of plaquenil and oxygenation is stable on RA  - Continue Contact and Airborne Isolation precautions  - clinically improving      - discharge planning once PT/INR acceptable range    Morris Prater MD  [phone]  After 5pm/weekends [phone]

## 2020-04-17 NOTE — PROGRESS NOTE ADULT - PROBLEM SELECTOR PLAN 2
- Continue Toprol XL 25mg PO daily (hold for MAPs <70)  - Continue Lisinopril 5 mg PO daily (hold of MAPs <70)  - Continue to hold Lasix 20mg PO QD, appears euvolemic off loop diuretic

## 2020-04-17 NOTE — PROGRESS NOTE ADULT - SUBJECTIVE AND OBJECTIVE BOX
Subjective:  - Feeling well, no acute events overnight. Denies SOB, LH/dizziness, N/V, fever, chills and diarrhea    Medications:  acetaminophen   Tablet .. 650 milliGRAM(s) Oral every 6 hours PRN  aMIOdarone    Tablet   Oral   aMIOdarone    Tablet 200 milliGRAM(s) Oral daily  aspirin enteric coated 81 milliGRAM(s) Oral daily  chlorhexidine 4% Liquid 1 Application(s) Topical <User Schedule>  enoxaparin Injectable 70 milliGRAM(s) SubCutaneous every 12 hours  famotidine    Tablet 20 milliGRAM(s) Oral daily  finasteride 5 milliGRAM(s) Oral daily  guaiFENesin   Syrup  (Sugar-Free) 100 milliGRAM(s) Oral every 6 hours PRN  lisinopril 5 milliGRAM(s) Oral daily  metoprolol succinate ER 25 milliGRAM(s) Oral daily  mirtazapine 7.5 milliGRAM(s) Oral at bedtime  polyethylene glycol 3350 17 Gram(s) Oral daily  senna 2 Tablet(s) Oral at bedtime  sodium chloride 0.9% lock flush 3 milliLiter(s) IV Push every 8 hours  tamsulosin 0.4 milliGRAM(s) Oral at bedtime  warfarin 5 milliGRAM(s) Oral once    Physical Exam:    Vitals:  Vital Signs Last 24 Hours  T(C): 36.3 (20 @ 10:08), Max: 36.7 (20 @ 05:38)  HR: 85 (20 @ 10:08) (80 - 90)  BP: 84/63 (20 @ 10:08) (84/63 - 98/64)  RR: 18 (20 @ 10:08) (16 - 18)  SpO2: 98% (20 @ 10:08) (97% - 98%)    Weight in k.8 ( @ 09:54)    I&O's Summary    2020 07:01  -  2020 07:00  --------------------------------------------------------  IN: 480 mL / OUT: 400 mL / NET: 80 mL    2020 07:01  -  2020 12:27  --------------------------------------------------------  IN: 0 mL / OUT: 200 mL / NET: -200 mL    Tele: SR    General: No distress. Comfortable.  HEENT: EOM intact.  Neck: Neck supple. No JVP noted. No masses  Chest: Clear to auscultation bilaterally  CV: +VAD hum  Abdomen: Soft, non-distended, non-tender  Skin: No rashes or skin breakdown, driveline dressing c/d/i  Neurology: Alert and oriented times three. Sensation intact  Psych: Affect normal    LVAD Interrogation  VAD TYPE HM 2  Speed 8800  Flow 4.8  Power 4.8  PI 5  Events: no events  Driveline exit site: dressing c/d/i  Programming changes: no changes made      Labs:                        12.4   3.68  )-----------( 255      ( 2020 05:51 )             38.5         130<L>  |  98  |  31<H>  ----------------------------<  102<H>  4.6   |  21<L>  |  1.10    Ca    9.4      2020 05:51  Mg     2.0         TPro  8.2  /  Alb  3.7  /  TBili  0.2  /  DBili  <0.1  /  AST  30  /  ALT  27  /  AlkPhos  63      PT/INR - ( 2020 05:51 )   PT: 13.8 sec;   INR: 1.20 ratio       PTT - ( 2020 05:51 )  PTT:31.3 sec    Lactate Dehydrogenase, Serum: 282 U/L ( @ 05:51)  Lactate Dehydrogenase, Serum: 265 U/L ( @ 05:30)  Lactate Dehydrogenase, Serum: 301 U/L (04-15 @ 05:28) Subjective:  - Feeling well, no acute events overnight. Denies SOB, LH/dizziness, N/V, fever, chills and diarrhea    Medications:  acetaminophen   Tablet .. 650 milliGRAM(s) Oral every 6 hours PRN  aMIOdarone    Tablet   Oral   aMIOdarone    Tablet 200 milliGRAM(s) Oral daily  aspirin enteric coated 81 milliGRAM(s) Oral daily  chlorhexidine 4% Liquid 1 Application(s) Topical <User Schedule>  enoxaparin Injectable 70 milliGRAM(s) SubCutaneous every 12 hours  famotidine    Tablet 20 milliGRAM(s) Oral daily  finasteride 5 milliGRAM(s) Oral daily  guaiFENesin   Syrup  (Sugar-Free) 100 milliGRAM(s) Oral every 6 hours PRN  lisinopril 5 milliGRAM(s) Oral daily  metoprolol succinate ER 25 milliGRAM(s) Oral daily  mirtazapine 7.5 milliGRAM(s) Oral at bedtime  polyethylene glycol 3350 17 Gram(s) Oral daily  senna 2 Tablet(s) Oral at bedtime  sodium chloride 0.9% lock flush 3 milliLiter(s) IV Push every 8 hours  tamsulosin 0.4 milliGRAM(s) Oral at bedtime  warfarin 5 milliGRAM(s) Oral once    Physical Exam:    Vitals:  Vital Signs Last 24 Hours  T(C): 36.3 (20 @ 10:08), Max: 36.7 (20 @ 05:38)  HR: 85 (20 @ 10:08) (80 - 90)  BP: 84/63 (20 @ 10:08) (84/63 - 98/64)  RR: 18 (20 @ 10:08) (16 - 18)  SpO2: 98% (20 @ 10:08) (97% - 98%)    Weight in k.8 ( @ 09:54)    I&O's Summary    2020 07:01  -  2020 07:00  --------------------------------------------------------  IN: 480 mL / OUT: 400 mL / NET: 80 mL    2020 07:01  -  2020 12:27  --------------------------------------------------------  IN: 0 mL / OUT: 200 mL / NET: -200 mL    Tele: SR    General: No distress. Comfortable.  HEENT: EOM intact.  Neck: Neck supple. No JVP noted. No masses  Chest: Clear to auscultation bilaterally  CV: +VAD hum  Abdomen: Soft, non-distended, non-tender  Skin: No rashes or skin breakdown, driveline dressing c/d/i  Neurology: Alert and oriented times three. Sensation intact  Psych: Affect normal    LVAD Interrogation  VAD TYPE HM 2  Speed 8800  Flow 4.2  Power 4.8  PI 3.3  Events: Frequent PI events without speed drops   Driveline exit site: dressing c/d/i  Programming changes: no changes made      Labs:                        12.4   3.68  )-----------( 255      ( 2020 05:51 )             38.5         130<L>  |  98  |  31<H>  ----------------------------<  102<H>  4.6   |  21<L>  |  1.10    Ca    9.4      2020 05:51  Mg     2.0         TPro  8.2  /  Alb  3.7  /  TBili  0.2  /  DBili  <0.1  /  AST  30  /  ALT  27  /  AlkPhos  63      PT/INR - ( 2020 05:51 )   PT: 13.8 sec;   INR: 1.20 ratio       PTT - ( 2020 05:51 )  PTT:31.3 sec    Lactate Dehydrogenase, Serum: 282 U/L ( @ 05:51)  Lactate Dehydrogenase, Serum: 265 U/L ( @ 05:30)  Lactate Dehydrogenase, Serum: 301 U/L (04-15 @ 05:28)

## 2020-04-17 NOTE — PROGRESS NOTE ADULT - SUBJECTIVE AND OBJECTIVE BOX
INFECTIOUS DISEASES FOLLOW UP-- Anitra Prater  [phone]    This is a follow up note for this  64yMale with  Shortness of breath      ROS:  CONSTITUTIONAL:  No fever, good appetite  CARDIOVASCULAR:  No chest pain or palpitations  RESPIRATORY:  No dyspnea  GASTROINTESTINAL:  No nausea, vomiting, diarrhea, or abdominal pain  GENITOURINARY:  No dysuria  NEUROLOGIC:  No headache,     Allergies    No Known Allergies    Intolerances        ANTIBIOTICS/RELEVANT:  antimicrobials    immunologic:    OTHER:  acetaminophen   Tablet .. 650 milliGRAM(s) Oral every 6 hours PRN  aMIOdarone    Tablet   Oral   aMIOdarone    Tablet 200 milliGRAM(s) Oral daily  aspirin enteric coated 81 milliGRAM(s) Oral daily  chlorhexidine 4% Liquid 1 Application(s) Topical <User Schedule>  enoxaparin Injectable 70 milliGRAM(s) SubCutaneous every 12 hours  famotidine    Tablet 20 milliGRAM(s) Oral daily  finasteride 5 milliGRAM(s) Oral daily  guaiFENesin   Syrup  (Sugar-Free) 100 milliGRAM(s) Oral every 6 hours PRN  lisinopril 5 milliGRAM(s) Oral daily  metoprolol succinate ER 25 milliGRAM(s) Oral daily  mirtazapine 7.5 milliGRAM(s) Oral at bedtime  polyethylene glycol 3350 17 Gram(s) Oral daily  senna 2 Tablet(s) Oral at bedtime  sodium chloride 0.9% lock flush 3 milliLiter(s) IV Push every 8 hours  tamsulosin 0.4 milliGRAM(s) Oral at bedtime  warfarin 5 milliGRAM(s) Oral once      Objective:  Vital Signs Last 24 Hrs  T(C): 36.7 (17 Apr 2020 18:25), Max: 36.7 (17 Apr 2020 05:38)  T(F): 98.1 (17 Apr 2020 18:25), Max: 98.1 (17 Apr 2020 05:38)  HR: 76 (17 Apr 2020 18:25) (76 - 90)  BP: 85/59 (17 Apr 2020 18:25) (84/63 - 98/64)  BP(mean): 68 (17 Apr 2020 18:25) (64 - 78)  RR: 18 (17 Apr 2020 18:25) (16 - 18)  SpO2: 97% (17 Apr 2020 18:25) (97% - 100%)    PHYSICAL EXAM:  Constitutional:no acute distress  Eyes:ALONSO, EOMI  Ear/Nose/Throat: no oral lesions, 	  Respiratory: clear BL  Cardiovascular: S1S2  Gastrointestinal:soft, (+) BS, no tenderness  Extremities:no e/e/c  No Lymphadenopathy  IV sites not inflammed.    LABS:                        12.4   3.68  )-----------( 255      ( 17 Apr 2020 05:51 )             38.5     04-17    130<L>  |  98  |  31<H>  ----------------------------<  102<H>  4.6   |  21<L>  |  1.10    Ca    9.4      17 Apr 2020 05:51  Mg     2.0     04-17    TPro  8.2  /  Alb  3.7  /  TBili  0.2  /  DBili  <0.1  /  AST  30  /  ALT  27  /  AlkPhos  63  04-17    PT/INR - ( 17 Apr 2020 05:51 )   PT: 13.8 sec;   INR: 1.20 ratio         PTT - ( 17 Apr 2020 05:51 )  PTT:31.3 sec      MICROBIOLOGY:            RECENT CULTURES:      RADIOLOGY & ADDITIONAL STUDIES:

## 2020-04-18 LAB
APTT BLD: 35.4 SEC — SIGNIFICANT CHANGE UP (ref 27.5–36.3)
CRP SERPL-MCNC: 0.19 MG/DL — SIGNIFICANT CHANGE UP (ref 0–0.4)
FERRITIN SERPL-MCNC: 296 NG/ML — SIGNIFICANT CHANGE UP (ref 30–400)
INR BLD: 1.34 RATIO — HIGH (ref 0.88–1.16)
LDH SERPL L TO P-CCNC: 281 U/L — HIGH (ref 50–242)
PROTHROM AB SERPL-ACNC: 15.4 SEC — HIGH (ref 10–12.9)

## 2020-04-18 PROCEDURE — 99232 SBSQ HOSP IP/OBS MODERATE 35: CPT

## 2020-04-18 RX ORDER — WARFARIN SODIUM 2.5 MG/1
6 TABLET ORAL ONCE
Refills: 0 | Status: COMPLETED | OUTPATIENT
Start: 2020-04-18 | End: 2020-04-18

## 2020-04-18 RX ADMIN — SODIUM CHLORIDE 3 MILLILITER(S): 9 INJECTION INTRAMUSCULAR; INTRAVENOUS; SUBCUTANEOUS at 13:17

## 2020-04-18 RX ADMIN — SODIUM CHLORIDE 3 MILLILITER(S): 9 INJECTION INTRAMUSCULAR; INTRAVENOUS; SUBCUTANEOUS at 22:18

## 2020-04-18 RX ADMIN — FAMOTIDINE 20 MILLIGRAM(S): 10 INJECTION INTRAVENOUS at 10:20

## 2020-04-18 RX ADMIN — AMIODARONE HYDROCHLORIDE 200 MILLIGRAM(S): 400 TABLET ORAL at 05:39

## 2020-04-18 RX ADMIN — SODIUM CHLORIDE 3 MILLILITER(S): 9 INJECTION INTRAMUSCULAR; INTRAVENOUS; SUBCUTANEOUS at 04:48

## 2020-04-18 RX ADMIN — MIRTAZAPINE 7.5 MILLIGRAM(S): 45 TABLET, ORALLY DISINTEGRATING ORAL at 22:17

## 2020-04-18 RX ADMIN — LISINOPRIL 5 MILLIGRAM(S): 2.5 TABLET ORAL at 10:20

## 2020-04-18 RX ADMIN — SENNA PLUS 2 TABLET(S): 8.6 TABLET ORAL at 22:17

## 2020-04-18 RX ADMIN — Medication 25 MILLIGRAM(S): at 05:38

## 2020-04-18 RX ADMIN — WARFARIN SODIUM 6 MILLIGRAM(S): 2.5 TABLET ORAL at 22:18

## 2020-04-18 RX ADMIN — ENOXAPARIN SODIUM 70 MILLIGRAM(S): 100 INJECTION SUBCUTANEOUS at 10:20

## 2020-04-18 RX ADMIN — Medication 81 MILLIGRAM(S): at 10:20

## 2020-04-18 RX ADMIN — ENOXAPARIN SODIUM 70 MILLIGRAM(S): 100 INJECTION SUBCUTANEOUS at 22:18

## 2020-04-18 RX ADMIN — CHLORHEXIDINE GLUCONATE 1 APPLICATION(S): 213 SOLUTION TOPICAL at 09:25

## 2020-04-18 RX ADMIN — FINASTERIDE 5 MILLIGRAM(S): 5 TABLET, FILM COATED ORAL at 10:20

## 2020-04-18 RX ADMIN — TAMSULOSIN HYDROCHLORIDE 0.4 MILLIGRAM(S): 0.4 CAPSULE ORAL at 22:18

## 2020-04-18 NOTE — PROGRESS NOTE ADULT - ASSESSMENT
65 y/o male PMH ACC/AHA stage D HF due to NICM HM2 LVAD, TV annuloplasty ring 9/12/17 as destination therapy due to severe peripheral artery disease with significant stenosis, SIADH, Depression, CKD-3, with hyperkalemia past E. coli UTIs. Today he reports generalized weakness since 4 days, chills since 3 days, non-specific abdominal pain and constipation since 2 days with loose stools today.  Reports +Covid-19 sick contacts at home. Denies cough, difficulty breathing, chest pain, palpitations. Directed to [facility] ED r/o COVID. Seen by ID -BCx & urine Cx sent, COVID 19 PCR positive- started on Plaquenil, cefepime and flagyl. Renal function improved with volume resuscitation, home BP medications currently on hold.   4/2: VSS, Per ID d/c cefepime and flagyl today. Continue Plaquenil taper. INR 4.27 holding Coumadin tonight, Of note overnight patient had two febrile episodes, Tylenol was given. MAPs went down to 30, IV fluid was given and MAPs improved.   4/3 temp overnight max 101.5- Tylenol prn; wbc 3; CRP 5.6 today; continue to monitor closely on Plaquenil.  MAP's improved today 70's- IV fluid 500 cc normal saline as per Dr. Iyer   continue to monitor O2 sats: 98% RA ; anticoagulation with coumadin INR 3.08- coumadin 2 mg this evening  4/4 VSS ; tmax 100.9 overnight;  MAP 80's; resume bph meds today and d/c aleman sun/ mon  anticoagulation with coumadin INR 2.0- coumadin 2.5 mg this evening;  today, CRP 10.1- Plaquenil taper as per ID, continue to monitor closely   4/5 VSS, complete Plaquenil taper tonight, RA O2 Sat 96%, INR 2.76, Coumadin 2mg per HF. Aleman d/c'd today. Possible d/c home Monday if stable.   4/6 VSS, pt voiding. MAPs>90, resumed back on lisinopril 10 daily, also 500ml NS bolus per HF. INR 4.12, Coumadin on hold tonight.   4/7: VSS, MAPs 68-88. Increased Lisinopril to 10 mg daily, gave 50cc NS bolus and started fluids at 75cc/hr for total of 500cc per HF. Coumadin on hold tonight for INR 4.88. Ensure started to supplement PO intake per nutrition recs.   4/8 INR 4.12 held coumadin ,, dc lisinopril for elevated creatinine  MAPS  60  4/9 MAP 72 amio load for VT, CRP 8.22, creat down to 1.34  4/10: VSS, afebrile overnight, O2 sat 95% on RA, MAPs 72-74, negative 720cc fluid balance. CRP trending down, 5.19 today. . INR 3.02 --> Coumadin held. K 5.7 this AM --> repeat K 5.2. Aleman removed today --> TOV   4/11 VVS; CHF following --> Lasix 40 IV x 1 for volume overload.  Supra-therapeutic INR 3.32 --> Continue to hold coumadin. CRP trending down   4/12   SR  100   INR  2.08   dosed coumadin 1 mg,  02 ssat 92 percent   VSS   Labs stable , on [id number]  4/13: VSS, O2 sat 94-96% on RA, afebrile, MAPs 85-88. INR 1.4 --> Coumadin 2 mg tonight per heart failure team. Last BM 4/7 --> currently on Senna daily, will start Miralax daily - 1st dose stat and give Dulcolax suppository x 1. Per heart failure team, start Lovenox 50 mg SQ BID, Toprol XL 25 mg QD and Lasix 20 mg QD today. If pt tolerates Toprol XL will start Lisinopril 5 mg QD tomorrow.   4/14: VSS, O2 sat % on RA, afebrile, MAPs 80-85. INR 1.26 --> will give Coumadin 2 mg tonight per heart failure team. Per heart failure team, pt had a 30 second episode of unresponsiveness earlier today that resolved spontaneously - CT head was negative for acute intracranial findings. Will continue to monitor. Lisinopril 5 mg PO QD started today, Lasix d/c'd, 500cc bolus NS given and 1.5 L free water restriction initiated given Na 127 per heart failure team.   4/15 Pt  with rising creat , n S/S fluid overload, given 500 cc ns over 5 hours. Coumadin. Head CT from 4/14 neg - no acute intracranial findings, no AMS today.    4/16: VSS, MAPs 75-82, O2 sat 96-98% on RA, afebrile overnight. CRP trending down, 0.29 today. INR 1.25 --> will give Coumadin 4 mg tonight per heart failure team. Continue current medication regimen as per heart failure team.   4/17  Discharge cancelled secondary to subtherapeutic INR. Coumadin 5mg dosed tonight 4/18 Coumadin 6mg tonight   Discharge planning: home w/ PT when INR therapeutic

## 2020-04-18 NOTE — PROGRESS NOTE ADULT - SUBJECTIVE AND OBJECTIVE BOX
VITAL SIGNS    Telemetry:  SR 80-90  Vital Signs Last 24 Hrs  T(C): 36.7 (20 @ 13:13), Max: 36.8 (20 @ 23:00)  T(F): 98.1 (20 @ 13:13), Max: 98.3 (20 @ 23:00)  HR: 85 (20 @ 13:13) (76 - 96)  BP: 98/77 (20 @ 04:32) (85/59 - 98/77)  RR: 18 (20 @ 13:13) (17 - 18)  SpO2: 97% (20 @ 13:13) (97% - 100%)             @ 07:01  -   @ 07:00  --------------------------------------------------------  IN: 120 mL / OUT: 1200 mL / NET: -1080 mL     @ 07:01  -   @ 13:30  --------------------------------------------------------  IN: 120 mL / OUT: 200 mL / NET: -80 mL       Daily     Daily Weight in k.9 (2020 08:42)  Admit Wt: Drug Dosing Weight  Height (cm): 177.8 (31 Mar 2020 13:04)  Weight (kg): 52.2 (31 Mar 2020 13:04)  BMI (kg/m2): 16.5 (31 Mar 2020 13:04)  BSA (m2): 1.65 (31 Mar 2020 13:04)      CAPILLARY BLOOD GLUCOSE              MEDICATIONS  acetaminophen   Tablet .. 650 milliGRAM(s) Oral every 6 hours PRN  aMIOdarone    Tablet   Oral   aMIOdarone    Tablet 200 milliGRAM(s) Oral daily  aspirin enteric coated 81 milliGRAM(s) Oral daily  chlorhexidine 4% Liquid 1 Application(s) Topical <User Schedule>  enoxaparin Injectable 70 milliGRAM(s) SubCutaneous every 12 hours  famotidine    Tablet 20 milliGRAM(s) Oral daily  finasteride 5 milliGRAM(s) Oral daily  guaiFENesin   Syrup  (Sugar-Free) 100 milliGRAM(s) Oral every 6 hours PRN  lisinopril 5 milliGRAM(s) Oral daily  metoprolol succinate ER 25 milliGRAM(s) Oral daily  mirtazapine 7.5 milliGRAM(s) Oral at bedtime  polyethylene glycol 3350 17 Gram(s) Oral daily  senna 2 Tablet(s) Oral at bedtime  sodium chloride 0.9% lock flush 3 milliLiter(s) IV Push every 8 hours  tamsulosin 0.4 milliGRAM(s) Oral at bedtime      >>> <<<  PHYSICAL EXAM  Subjective: NAD, mildly confused  Neurology: alert and oriented x 2, nonfocal, no gross deficits  CV :LVAD sounds  Lungs: CTA b/l  Abdomen: soft, NT,ND, (+ )BM  :  voiding  Extremities: -c/c/e      LABS      130<L>  |  98  |  31<H>  ----------------------------<  102<H>  4.6   |  21<L>  |  1.10    Ca    9.4      2020 05:51  Mg     2.0         TPro  8.2  /  Alb  3.7  /  TBili  0.2  /  DBili  <0.1  /  AST  30  /  ALT  27  /  AlkPhos  63                                   12.4   3.68  )-----------( 255      ( 2020 05:51 )             38.5          PT/INR - ( 2020 07:15 )   PT: 15.4 sec;   INR: 1.34 ratio         PTT - ( 2020 07:15 )  PTT:35.4 sec       PAST MEDICAL & SURGICAL HISTORY:  Pleural effusion  Depression  CAD (coronary artery disease)  CHF (congestive heart failure)  S/P ventricular assist device  S/P TVR (tricuspid valve repair)

## 2020-04-19 ENCOUNTER — TRANSCRIPTION ENCOUNTER (OUTPATIENT)
Age: 65
End: 2020-04-19

## 2020-04-19 LAB
ANION GAP SERPL CALC-SCNC: 12 MMOL/L — SIGNIFICANT CHANGE UP (ref 5–17)
BUN SERPL-MCNC: 21 MG/DL — SIGNIFICANT CHANGE UP (ref 7–23)
CALCIUM SERPL-MCNC: 9 MG/DL — SIGNIFICANT CHANGE UP (ref 8.4–10.5)
CHLORIDE SERPL-SCNC: 97 MMOL/L — SIGNIFICANT CHANGE UP (ref 96–108)
CO2 SERPL-SCNC: 22 MMOL/L — SIGNIFICANT CHANGE UP (ref 22–31)
CREAT SERPL-MCNC: 0.99 MG/DL — SIGNIFICANT CHANGE UP (ref 0.5–1.3)
CRP SERPL-MCNC: 0.18 MG/DL — SIGNIFICANT CHANGE UP (ref 0–0.4)
GLUCOSE SERPL-MCNC: 106 MG/DL — HIGH (ref 70–99)
HCT VFR BLD CALC: 35.2 % — LOW (ref 39–50)
HGB BLD-MCNC: 11.9 G/DL — LOW (ref 13–17)
INR BLD: 1.73 RATIO — HIGH (ref 0.88–1.16)
LDH SERPL L TO P-CCNC: 252 U/L — HIGH (ref 50–242)
MCHC RBC-ENTMCNC: 29.2 PG — SIGNIFICANT CHANGE UP (ref 27–34)
MCHC RBC-ENTMCNC: 33.8 GM/DL — SIGNIFICANT CHANGE UP (ref 32–36)
MCV RBC AUTO: 86.5 FL — SIGNIFICANT CHANGE UP (ref 80–100)
NRBC # BLD: 0 /100 WBCS — SIGNIFICANT CHANGE UP (ref 0–0)
PLATELET # BLD AUTO: 188 K/UL — SIGNIFICANT CHANGE UP (ref 150–400)
POTASSIUM SERPL-MCNC: 4.4 MMOL/L — SIGNIFICANT CHANGE UP (ref 3.5–5.3)
POTASSIUM SERPL-SCNC: 4.4 MMOL/L — SIGNIFICANT CHANGE UP (ref 3.5–5.3)
PROTHROM AB SERPL-ACNC: 20.2 SEC — HIGH (ref 10–12.9)
RBC # BLD: 4.07 M/UL — LOW (ref 4.2–5.8)
RBC # FLD: 13.7 % — SIGNIFICANT CHANGE UP (ref 10.3–14.5)
SODIUM SERPL-SCNC: 131 MMOL/L — LOW (ref 135–145)
WBC # BLD: 3.43 K/UL — LOW (ref 3.8–10.5)
WBC # FLD AUTO: 3.43 K/UL — LOW (ref 3.8–10.5)

## 2020-04-19 PROCEDURE — 93010 ELECTROCARDIOGRAM REPORT: CPT

## 2020-04-19 PROCEDURE — 99232 SBSQ HOSP IP/OBS MODERATE 35: CPT

## 2020-04-19 RX ORDER — FINASTERIDE 5 MG/1
1 TABLET, FILM COATED ORAL
Qty: 30 | Refills: 0
Start: 2020-04-19 | End: 2020-05-18

## 2020-04-19 RX ORDER — AMIODARONE HYDROCHLORIDE 400 MG/1
1 TABLET ORAL
Qty: 30 | Refills: 0
Start: 2020-04-19 | End: 2020-05-18

## 2020-04-19 RX ORDER — METOPROLOL TARTRATE 50 MG
1 TABLET ORAL
Qty: 30 | Refills: 0
Start: 2020-04-19 | End: 2020-05-18

## 2020-04-19 RX ORDER — ACETAMINOPHEN 500 MG
2 TABLET ORAL
Qty: 240 | Refills: 0
Start: 2020-04-19 | End: 2020-05-18

## 2020-04-19 RX ORDER — ASPIRIN/CALCIUM CARB/MAGNESIUM 324 MG
1 TABLET ORAL
Qty: 30 | Refills: 0
Start: 2020-04-19 | End: 2020-05-18

## 2020-04-19 RX ORDER — WARFARIN SODIUM 2.5 MG/1
1 TABLET ORAL
Qty: 30 | Refills: 0
Start: 2020-04-19 | End: 2020-05-18

## 2020-04-19 RX ORDER — POLYETHYLENE GLYCOL 3350 17 G/17G
17 POWDER, FOR SOLUTION ORAL
Qty: 1 | Refills: 0
Start: 2020-04-19 | End: 2020-05-24

## 2020-04-19 RX ORDER — FAMOTIDINE 10 MG/ML
1 INJECTION INTRAVENOUS
Qty: 30 | Refills: 0
Start: 2020-04-19 | End: 2020-05-18

## 2020-04-19 RX ORDER — LISINOPRIL 2.5 MG/1
1 TABLET ORAL
Qty: 0 | Refills: 0 | DISCHARGE

## 2020-04-19 RX ORDER — MIRTAZAPINE 45 MG/1
1 TABLET, ORALLY DISINTEGRATING ORAL
Qty: 30 | Refills: 0
Start: 2020-04-19 | End: 2020-05-18

## 2020-04-19 RX ORDER — WARFARIN SODIUM 2.5 MG/1
6 TABLET ORAL ONCE
Refills: 0 | Status: DISCONTINUED | OUTPATIENT
Start: 2020-04-19 | End: 2020-04-19

## 2020-04-19 RX ORDER — FAMOTIDINE 10 MG/ML
1 INJECTION INTRAVENOUS
Qty: 0 | Refills: 0 | DISCHARGE

## 2020-04-19 RX ORDER — WARFARIN SODIUM 2.5 MG/1
5 TABLET ORAL ONCE
Refills: 0 | Status: COMPLETED | OUTPATIENT
Start: 2020-04-19 | End: 2020-04-19

## 2020-04-19 RX ORDER — LISINOPRIL 2.5 MG/1
1 TABLET ORAL
Qty: 30 | Refills: 0
Start: 2020-04-19 | End: 2020-05-18

## 2020-04-19 RX ORDER — SENNA PLUS 8.6 MG/1
2 TABLET ORAL
Qty: 60 | Refills: 0
Start: 2020-04-19 | End: 2020-05-18

## 2020-04-19 RX ORDER — TAMSULOSIN HYDROCHLORIDE 0.4 MG/1
1 CAPSULE ORAL
Qty: 30 | Refills: 0
Start: 2020-04-19 | End: 2020-05-18

## 2020-04-19 RX ORDER — SILODOSIN 4 MG/1
1 CAPSULE ORAL
Qty: 0 | Refills: 0 | DISCHARGE

## 2020-04-19 RX ORDER — TROSPIUM CHLORIDE 20 MG/1
20 TABLET, FILM COATED ORAL
Qty: 0 | Refills: 0 | DISCHARGE

## 2020-04-19 RX ORDER — POLYETHYLENE GLYCOL 3350 17 G/17G
17 POWDER, FOR SOLUTION ORAL
Qty: 1 | Refills: 0
Start: 2020-04-19 | End: 2020-05-18

## 2020-04-19 RX ADMIN — CHLORHEXIDINE GLUCONATE 1 APPLICATION(S): 213 SOLUTION TOPICAL at 10:41

## 2020-04-19 RX ADMIN — ENOXAPARIN SODIUM 70 MILLIGRAM(S): 100 INJECTION SUBCUTANEOUS at 20:56

## 2020-04-19 RX ADMIN — ENOXAPARIN SODIUM 70 MILLIGRAM(S): 100 INJECTION SUBCUTANEOUS at 10:41

## 2020-04-19 RX ADMIN — SODIUM CHLORIDE 3 MILLILITER(S): 9 INJECTION INTRAMUSCULAR; INTRAVENOUS; SUBCUTANEOUS at 20:56

## 2020-04-19 RX ADMIN — AMIODARONE HYDROCHLORIDE 200 MILLIGRAM(S): 400 TABLET ORAL at 06:49

## 2020-04-19 RX ADMIN — FAMOTIDINE 20 MILLIGRAM(S): 10 INJECTION INTRAVENOUS at 10:41

## 2020-04-19 RX ADMIN — SODIUM CHLORIDE 3 MILLILITER(S): 9 INJECTION INTRAMUSCULAR; INTRAVENOUS; SUBCUTANEOUS at 06:40

## 2020-04-19 RX ADMIN — LISINOPRIL 5 MILLIGRAM(S): 2.5 TABLET ORAL at 10:41

## 2020-04-19 RX ADMIN — MIRTAZAPINE 7.5 MILLIGRAM(S): 45 TABLET, ORALLY DISINTEGRATING ORAL at 21:18

## 2020-04-19 RX ADMIN — Medication 25 MILLIGRAM(S): at 06:49

## 2020-04-19 RX ADMIN — TAMSULOSIN HYDROCHLORIDE 0.4 MILLIGRAM(S): 0.4 CAPSULE ORAL at 20:56

## 2020-04-19 RX ADMIN — POLYETHYLENE GLYCOL 3350 17 GRAM(S): 17 POWDER, FOR SOLUTION ORAL at 10:42

## 2020-04-19 RX ADMIN — Medication 81 MILLIGRAM(S): at 10:40

## 2020-04-19 RX ADMIN — WARFARIN SODIUM 5 MILLIGRAM(S): 2.5 TABLET ORAL at 20:57

## 2020-04-19 RX ADMIN — SODIUM CHLORIDE 3 MILLILITER(S): 9 INJECTION INTRAMUSCULAR; INTRAVENOUS; SUBCUTANEOUS at 14:47

## 2020-04-19 RX ADMIN — FINASTERIDE 5 MILLIGRAM(S): 5 TABLET, FILM COATED ORAL at 10:41

## 2020-04-19 NOTE — DISCHARGE NOTE PROVIDER - NSDCFUADDINST_GEN_ALL_CORE_FT
Monitor your temperature   maintain social distancing wear  a mask if unable to maintain 6 feet distance   No visitors in your home  take all medications as prescribed   Coumadin 5 mg    Follow up with Tamanna Mcclendon NP  [phone] Monitor your temperature   maintain social distancing wear  a mask if unable to maintain 6 feet distance   No visitors in your home  take all medications as prescribed   Coumadin 5 mg    Follow up with Tamanna Mcclendon NP  [phone]  Will have repeat INR/LDH/hepatic function/ CMP test done on Wednesday 4/22

## 2020-04-19 NOTE — PROGRESS NOTE ADULT - PROBLEM SELECTOR PLAN 2
Heart Failure following  Continue ASA 81 daily   Continue AC with Coumadin - 6 mg tonight   Continue Lovenox 70 mg SQ BID until discharged - pt will not go home on Lovenox Continue Toprol XL 25 mg QD   Continue Lisinopril 5 mg PO QD   Continue to monitor daily labs CBC/CMP/INR/LDH Heart Failure following  Continue ASA 81 daily   Continue AC with Coumadin - 5 mg tonight   Continue Lovenox 70 mg SQ BID until discharged - pt will not go home on Lovenox Continue Toprol XL 25 mg QD   Continue Lisinopril 5 mg PO QD   Continue to monitor daily labs CBC/CMP/INR/LDH  Discharge home paper-work complete

## 2020-04-19 NOTE — DISCHARGE NOTE PROVIDER - HOSPITAL COURSE
63 y/o male PMH ACC/AHA stage D HF due to NICM HM2 LVAD, TV annuloplasty ring 9/12/17 as destination therapy due to severe peripheral artery disease with significant stenosis, SIADH, Depression, CKD-3, with hyperkalemia past E. coli UTIs. Today he reports generalized weakness since 4 days, chills since 3 days, non-specific abdominal pain and constipation since 2 days with loose stools today.  Reports +Covid-19 sick contacts at home. Denies cough, difficulty breathing, chest pain, palpitations. Directed to [facility] ED r/o COVID. Seen by ID -BCx & urine Cx sent, COVID 19 PCR positive- started on Plaquenil, cefepime and flagyl. Renal function improved with volume resuscitation, home BP medications currently on hold.     4/2: VSS, Per ID d/c cefepime and flagyl today. Continue Plaquenil taper. INR 4.27 holding Coumadin tonight, Of note overnight patient had two febrile episodes, Tylenol was given. MAPs went down to 30, IV fluid was given and MAPs improved.     4/3 temp overnight max 101.5- Tylenol prn; wbc 3; CRP 5.6 today; continue to monitor closely on Plaquenil.  MAP's improved today 70's- IV fluid 500 cc normal saline as per Dr. Iyer     continue to monitor O2 sats: 98% RA ; anticoagulation with coumadin INR 3.08- coumadin 2 mg this evening    4/4 VSS ; tmax 100.9 overnight;  MAP 80's; resume bph meds today and d/c aleman sun/ mon    anticoagulation with coumadin INR 2.0- coumadin 2.5 mg this evening;  today, CRP 10.1- Plaquenil taper as per ID, continue to monitor closely     4/5 VSS, complete Plaquenil taper tonight, RA O2 Sat 96%, INR 2.76, Coumadin 2mg per HF. Aleman d/c'd today. Possible d/c home Monday if stable.     4/6 VSS, pt voiding. MAPs>90, resumed back on lisinopril 10 daily, also 500ml NS bolus per HF. INR 4.12, Coumadin on hold tonight.     4/7: VSS, MAPs 68-88. Increased Lisinopril to 10 mg daily, gave 50cc NS bolus and started fluids     at 75cc/hr for total of 500cc per HF. Coumadin on hold tonight for INR 4.88. Ensure started to supplement PO intake per nutrition recs.     4/8 INR 4.12 held coumadin ,, dc lisinopril for elevated creatinine  MAPS  60    4/9 MAP 72 amio load for VT, CRP 8.22, creat down to 1.34    4/10: VSS, afebrile overnight, O2 sat 95% on RA, MAPs 72-74, negative 720cc fluid balance. CRP trending down, 5.19 today. . INR 3.02 --> Coumadin held. K 5.7 this AM --> repeat K 5.2. Aleman removed today --> TOV     4/11 VVS; CHF following --> Lasix 40 IV x 1 for volume overload.  Supra-therapeutic INR 3.32 -->     Continue to hold coumadin. CRP trending down     4/12   SR  100   INR  2.08   dosed coumadin 1 mg,  02 ssat 92 percent   VSS   Labs stable , on [id number]    4/13: VSS, O2 sat 94-96% on RA, afebrile, MAPs 85-88. INR 1.4 --> Coumadin 2 mg tonight per heart failure team. Last BM 4/7 --> currently on Senna daily, will start Miralax daily - 1st dose stat and give Dulcolax suppository x 1. Per heart failure team, start Lovenox 50 mg SQ BID, Toprol XL 25 mg QD and Lasix 20 mg QD today. If pt tolerates Toprol XL will start Lisinopril 5 mg QD tomorrow.     4/14: VSS, O2 sat % on RA, afebrile, MAPs 80-85. INR 1.26 --> will give Coumadin 2 mg tonight per heart failure team. Per heart failure team, pt had a 30 second episode of unresponsiveness earlier today that resolved spontaneously - CT head was negative for acute intracranial findings. Will continue to monitor. Lisinopril 5 mg PO QD started today, Lasix d/c'd, 500cc bolus NS given and 1.5 L free water restriction initiated given Na 127 per heart failure team.     4/15 Pt  with rising creat , n S/S fluid overload, given 500 cc ns over 5 hours. Coumadin. Head CT from 4/14 neg - no acute intracranial findings, no AMS today.      4/16: VSS, MAPs 75-82, O2 sat 96-98% on RA, afebrile overnight. CRP trending down, 0.29 today. INR 1.25 --> will give Coumadin 4 mg tonight per heart failure team. Continue current medication regimen as per heart failure team.     4/17  Discharge cancelled secondary to subtherapeutic INR. Coumadin 5mg dosed tonight     4/18 VSS coumadin 6     4/199 VSS seen and examined by HF team stable and eager for discharge on 5 mg coumadin  INR 1.79 65 y/o male PMH ACC/AHA stage D HF due to NICM HM2 LVAD, TV annuloplasty ring 9/12/17 as destination therapy due to severe peripheral artery disease with significant stenosis, SIADH, Depression, CKD-3, with hyperkalemia past E. coli UTIs. Today he reports generalized weakness since 4 days, chills since 3 days, non-specific abdominal pain and constipation since 2 days with loose stools today.  Reports +Covid-19 sick contacts at home. Denies cough, difficulty breathing, chest pain, palpitations. Directed to [facility] ED r/o COVID. Seen by ID -BCx & urine Cx sent, COVID 19 PCR positive- started on Plaquenil, cefepime and flagyl. Renal function improved with volume resuscitation, home BP medications currently on hold.     4/2: VSS, Per ID d/c cefepime and flagyl today. Continue Plaquenil taper. INR 4.27 holding Coumadin tonight, Of note overnight patient had two febrile episodes, Tylenol was given. MAPs went down to 30, IV fluid was given and MAPs improved.     4/3 temp overnight max 101.5- Tylenol prn; wbc 3; CRP 5.6 today; continue to monitor closely on Plaquenil.  MAP's improved today 70's- IV fluid 500 cc normal saline as per Dr. Iyer     continue to monitor O2 sats: 98% RA ; anticoagulation with coumadin INR 3.08- coumadin 2 mg this evening    4/4 VSS ; tmax 100.9 overnight;  MAP 80's; resume bph meds today and d/c aleman sun/ mon    anticoagulation with coumadin INR 2.0- coumadin 2.5 mg this evening;  today, CRP 10.1- Plaquenil taper as per ID, continue to monitor closely     4/5 VSS, complete Plaquenil taper tonight, RA O2 Sat 96%, INR 2.76, Coumadin 2mg per HF. Aleman d/c'd today. Possible d/c home Monday if stable.     4/6 VSS, pt voiding. MAPs>90, resumed back on lisinopril 10 daily, also 500ml NS bolus per HF. INR 4.12, Coumadin on hold tonight.     4/7: VSS, MAPs 68-88. Increased Lisinopril to 10 mg daily, gave 50cc NS bolus and started fluids     at 75cc/hr for total of 500cc per HF. Coumadin on hold tonight for INR 4.88. Ensure started to supplement PO intake per nutrition recs.     4/8 INR 4.12 held coumadin ,, dc lisinopril for elevated creatinine  MAPS  60    4/9 MAP 72 amio load for VT, CRP 8.22, creat down to 1.34    4/10: VSS, afebrile overnight, O2 sat 95% on RA, MAPs 72-74, negative 720cc fluid balance. CRP trending down, 5.19 today. . INR 3.02 --> Coumadin held. K 5.7 this AM --> repeat K 5.2. Aleman removed today --> TOV     4/11 VVS; CHF following --> Lasix 40 IV x 1 for volume overload.  Supra-therapeutic INR 3.32 -->     Continue to hold coumadin. CRP trending down     4/12   SR  100   INR  2.08   dosed coumadin 1 mg,  02 ssat 92 percent   VSS   Labs stable , on [id number]    4/13: VSS, O2 sat 94-96% on RA, afebrile, MAPs 85-88. INR 1.4 --> Coumadin 2 mg tonight per heart failure team. Last BM 4/7 --> currently on Senna daily, will start Miralax daily - 1st dose stat and give Dulcolax suppository x 1. Per heart failure team, start Lovenox 50 mg SQ BID, Toprol XL 25 mg QD and Lasix 20 mg QD today. If pt tolerates Toprol XL will start Lisinopril 5 mg QD tomorrow.     4/14: VSS, O2 sat % on RA, afebrile, MAPs 80-85. INR 1.26 --> will give Coumadin 2 mg tonight per heart failure team. Per heart failure team, pt had a 30 second episode of unresponsiveness earlier today that resolved spontaneously - CT head was negative for acute intracranial findings. Will continue to monitor. Lisinopril 5 mg PO QD started today, Lasix d/c'd, 500cc bolus NS given and 1.5 L free water restriction initiated given Na 127 per heart failure team.     4/15 Pt  with rising creat , n S/S fluid overload, given 500 cc ns over 5 hours. Coumadin. Head CT from 4/14 neg - no acute intracranial findings, no AMS today.      4/16: VSS, MAPs 75-82, O2 sat 96-98% on RA, afebrile overnight. CRP trending down, 0.29 today. INR 1.25 --> will give Coumadin 4 mg tonight per heart failure team. Continue current medication regimen as per heart failure team.     4/17  Discharge cancelled secondary to subtherapeutic INR. Coumadin 5mg dosed tonight     4/18 VSS coumadin 6     4/199 VSS seen and examined by HF team stable and eager for discharge on 5 mg coumadin  INR 1.79    4/20: VSS, discharge home today, INR 2.29

## 2020-04-19 NOTE — PROGRESS NOTE ADULT - ASSESSMENT
63 y/o male PMH ACC/AHA stage D HF due to NICM HM2 LVAD, TV annuloplasty ring 9/12/17 as destination therapy due to severe peripheral artery disease with significant stenosis, SIADH, Depression, CKD-3, with hyperkalemia past E. coli UTIs. Today he reports generalized weakness since 4 days, chills since 3 days, non-specific abdominal pain and constipation since 2 days with loose stools today.  Reports +Covid-19 sick contacts at home. Denies cough, difficulty breathing, chest pain, palpitations. Directed to [facility] ED r/o COVID. Seen by ID -BCx & urine Cx sent, COVID 19 PCR positive- started on Plaquenil, cefepime and flagyl. Renal function improved with volume resuscitation, home BP medications currently on hold.   4/2: VSS, Per ID d/c cefepime and flagyl today. Continue Plaquenil taper. INR 4.27 holding Coumadin tonight, Of note overnight patient had two febrile episodes, Tylenol was given. MAPs went down to 30, IV fluid was given and MAPs improved.   4/3 temp overnight max 101.5- Tylenol prn; wbc 3; CRP 5.6 today; continue to monitor closely on Plaquenil.  MAP's improved today 70's- IV fluid 500 cc normal saline as per Dr. Iyer   continue to monitor O2 sats: 98% RA ; anticoagulation with coumadin INR 3.08- coumadin 2 mg this evening  4/4 VSS ; tmax 100.9 overnight;  MAP 80's; resume bph meds today and d/c aleman sun/ mon  anticoagulation with coumadin INR 2.0- coumadin 2.5 mg this evening;  today, CRP 10.1- Plaquenil taper as per ID, continue to monitor closely   4/5 VSS, complete Plaquenil taper tonight, RA O2 Sat 96%, INR 2.76, Coumadin 2mg per HF. Aleman d/c'd today. Possible d/c home Monday if stable.   4/6 VSS, pt voiding. MAPs>90, resumed back on lisinopril 10 daily, also 500ml NS bolus per HF. INR 4.12, Coumadin on hold tonight.   4/7: VSS, MAPs 68-88. Increased Lisinopril to 10 mg daily, gave 50cc NS bolus and started fluids at 75cc/hr for total of 500cc per HF. Coumadin on hold tonight for INR 4.88. Ensure started to supplement PO intake per nutrition recs.   4/8 INR 4.12 held coumadin ,, dc lisinopril for elevated creatinine  MAPS  60  4/9 MAP 72 amio load for VT, CRP 8.22, creat down to 1.34  4/10: VSS, afebrile overnight, O2 sat 95% on RA, MAPs 72-74, negative 720cc fluid balance. CRP trending down, 5.19 today. . INR 3.02 --> Coumadin held. K 5.7 this AM --> repeat K 5.2. Aleman removed today --> TOV   4/11 VVS; CHF following --> Lasix 40 IV x 1 for volume overload.  Supra-therapeutic INR 3.32 --> Continue to hold coumadin. CRP trending down   4/12   SR  100   INR  2.08   dosed coumadin 1 mg,  02 ssat 92 percent   VSS   Labs stable , on [id number]  4/13: VSS, O2 sat 94-96% on RA, afebrile, MAPs 85-88. INR 1.4 --> Coumadin 2 mg tonight per heart failure team. Last BM 4/7 --> currently on Senna daily, will start Miralax daily - 1st dose stat and give Dulcolax suppository x 1. Per heart failure team, start Lovenox 50 mg SQ BID, Toprol XL 25 mg QD and Lasix 20 mg QD today. If pt tolerates Toprol XL will start Lisinopril 5 mg QD tomorrow.   4/14: VSS, O2 sat % on RA, afebrile, MAPs 80-85. INR 1.26 --> will give Coumadin 2 mg tonight per heart failure team. Per heart failure team, pt had a 30 second episode of unresponsiveness earlier today that resolved spontaneously - CT head was negative for acute intracranial findings. Will continue to monitor. Lisinopril 5 mg PO QD started today, Lasix d/c'd, 500cc bolus NS given and 1.5 L free water restriction initiated given Na 127 per heart failure team.   4/15 Pt  with rising creat , n S/S fluid overload, given 500 cc ns over 5 hours. Coumadin. Head CT from 4/14 neg - no acute intracranial findings, no AMS today.    4/16: VSS, MAPs 75-82, O2 sat 96-98% on RA, afebrile overnight. CRP trending down, 0.29 today. INR 1.25 --> will give Coumadin 4 mg tonight per heart failure team. Continue current medication regimen as per heart failure team.   4/17  Discharge cancelled secondary to subtherapeutic INR. Coumadin 5mg dosed tonight   4/18 Coumadin 6mg tonight   4/19 VSS INR 1.73 coumadin 6 tonight  Discharge planning: home w/ PT when INR therapeutic 65 y/o male PMH ACC/AHA stage D HF due to NICM HM2 LVAD, TV annuloplasty ring 9/12/17 as destination therapy due to severe peripheral artery disease with significant stenosis, SIADH, Depression, CKD-3, with hyperkalemia past E. coli UTIs. Today he reports generalized weakness since 4 days, chills since 3 days, non-specific abdominal pain and constipation since 2 days with loose stools today.  Reports +Covid-19 sick contacts at home. Denies cough, difficulty breathing, chest pain, palpitations. Directed to [facility] ED r/o COVID. Seen by ID -BCx & urine Cx sent, COVID 19 PCR positive- started on Plaquenil, cefepime and flagyl. Renal function improved with volume resuscitation, home BP medications currently on hold.   4/2: VSS, Per ID d/c cefepime and flagyl today. Continue Plaquenil taper. INR 4.27 holding Coumadin tonight, Of note overnight patient had two febrile episodes, Tylenol was given. MAPs went down to 30, IV fluid was given and MAPs improved.   4/3 temp overnight max 101.5- Tylenol prn; wbc 3; CRP 5.6 today; continue to monitor closely on Plaquenil.  MAP's improved today 70's- IV fluid 500 cc normal saline as per Dr. Iyer   continue to monitor O2 sats: 98% RA ; anticoagulation with coumadin INR 3.08- coumadin 2 mg this evening  4/4 VSS ; tmax 100.9 overnight;  MAP 80's; resume bph meds today and d/c aleman sun/ mon  anticoagulation with coumadin INR 2.0- coumadin 2.5 mg this evening;  today, CRP 10.1- Plaquenil taper as per ID, continue to monitor closely   4/5 VSS, complete Plaquenil taper tonight, RA O2 Sat 96%, INR 2.76, Coumadin 2mg per HF. Aleman d/c'd today. Possible d/c home Monday if stable.   4/6 VSS, pt voiding. MAPs>90, resumed back on lisinopril 10 daily, also 500ml NS bolus per HF. INR 4.12, Coumadin on hold tonight.   4/7: VSS, MAPs 68-88. Increased Lisinopril to 10 mg daily, gave 50cc NS bolus and started fluids at 75cc/hr for total of 500cc per HF. Coumadin on hold tonight for INR 4.88. Ensure started to supplement PO intake per nutrition recs.   4/8 INR 4.12 held coumadin ,, dc lisinopril for elevated creatinine  MAPS  60  4/9 MAP 72 amio load for VT, CRP 8.22, creat down to 1.34  4/10: VSS, afebrile overnight, O2 sat 95% on RA, MAPs 72-74, negative 720cc fluid balance. CRP trending down, 5.19 today. . INR 3.02 --> Coumadin held. K 5.7 this AM --> repeat K 5.2. Aleman removed today --> TOV   4/11 VVS; CHF following --> Lasix 40 IV x 1 for volume overload.  Supra-therapeutic INR 3.32 --> Continue to hold coumadin. CRP trending down   4/12   SR  100   INR  2.08   dosed coumadin 1 mg,  02 ssat 92 percent   VSS   Labs stable , on [id number]  4/13: VSS, O2 sat 94-96% on RA, afebrile, MAPs 85-88. INR 1.4 --> Coumadin 2 mg tonight per heart failure team. Last BM 4/7 --> currently on Senna daily, will start Miralax daily - 1st dose stat and give Dulcolax suppository x 1. Per heart failure team, start Lovenox 50 mg SQ BID, Toprol XL 25 mg QD and Lasix 20 mg QD today. If pt tolerates Toprol XL will start Lisinopril 5 mg QD tomorrow.   4/14: VSS, O2 sat % on RA, afebrile, MAPs 80-85. INR 1.26 --> will give Coumadin 2 mg tonight per heart failure team. Per heart failure team, pt had a 30 second episode of unresponsiveness earlier today that resolved spontaneously - CT head was negative for acute intracranial findings. Will continue to monitor. Lisinopril 5 mg PO QD started today, Lasix d/c'd, 500cc bolus NS given and 1.5 L free water restriction initiated given Na 127 per heart failure team.   4/15 Pt  with rising creat , n S/S fluid overload, given 500 cc ns over 5 hours. Coumadin. Head CT from 4/14 neg - no acute intracranial findings, no AMS today.    4/16: VSS, MAPs 75-82, O2 sat 96-98% on RA, afebrile overnight. CRP trending down, 0.29 today. INR 1.25 --> will give Coumadin 4 mg tonight per heart failure team. Continue current medication regimen as per heart failure team.   4/17  Discharge cancelled secondary to subtherapeutic INR. Coumadin 5mg dosed tonight   4/18 Coumadin 6mg tonight   4/19 VSS INR 1.73 coumadin 5 tonight  Discharge planning: home w/ PT today  awaiting pickup if patient leaving  am 5 coumadin tonight

## 2020-04-19 NOTE — PROGRESS NOTE ADULT - SUBJECTIVE AND OBJECTIVE BOX
Subjective    VITAL SIGNS    Telemetry:  [id number]    Vital Signs Last 24 Hrs  T(C): 36.7 (04-19-20 @ 09:27), Max: 36.9 (04-18-20 @ 22:02)  T(F): 98.1 (04-19-20 @ 09:27), Max: 98.4 (04-18-20 @ 22:02)  HR: 87 (04-19-20 @ 09:27) (82 - 97)  BP: 107/76 (04-19-20 @ 08:57) (107/76 - 107/76)    04-18 @ 07:01  -  04-19 @ 07:00  --------------------------------------------------------  IN: 460 mL / OUT: 630 mL / NET: -170 mL    04-19 @ 07:01  -  04-19 @ 09:49  --------------------------------------------------------  IN: 240 mL / OUT: 0 mL / NET: 240 mL                        11.9   3.43  )-----------( 188      ( 19 Apr 2020 05:57 )             35.2   04-19    131<L>  |  97  |  21  ----------------------------<  106<H>  4.4   |  22  |  0.99    Ca    9.0      19 Apr 2020 05:57  PT/INR - ( 19 Apr 2020 05:57 )   PT: 20.2 sec;   INR: 1.73 ratio         PTT - ( 18 Apr 2020 07:15 )  PTT:35.4 sec          Daily     Daily         CAPILLARY BLOOD GLUCOSE              Drains:     MS         [  ] Drainage:                 L Pleural  [  ]  Drainage:                R Pleural  [  ]  Drainage:    Pacing Wires        [  ]   Settings:                                  Isolated  [  ]    Coumadin    [ ] YES          [  ]      NO         Reason:                               PHYSICAL EXAM        Neurology: alert and oriented x 3, nonfocal, no gross deficits    CV :    Sternal Wound :  CDI , Stable    Lungs:    Abdomen: soft, nontender, nondistended, positive bowel sounds, last bowel movement     :               Extremities:                                           Physical Therapy Rec:   Home  [  ]   Home w/ PT  [  ]  Rehab  [  ]    Discussed with Cardiothoracic Team at AM rounds. Subjective    VITAL SIGNS    Telemetry:  [id number]    Vital Signs Last 24 Hrs  T(C): 36.7 (04-19-20 @ 09:27), Max: 36.9 (04-18-20 @ 22:02)  T(F): 98.1 (04-19-20 @ 09:27), Max: 98.4 (04-18-20 @ 22:02)  HR: 87 (04-19-20 @ 09:27) (82 - 97)  BP: 107/76 (04-19-20 @ 08:57) (107/76 - 107/76)    04-18 @ 07:01  -  04-19 @ 07:00  --------------------------------------------------------  IN: 460 mL / OUT: 630 mL / NET: -170 mL    04-19 @ 07:01  -  04-19 @ 09:49  --------------------------------------------------------  IN: 240 mL / OUT: 0 mL / NET: 240 mL                        11.9   3.43  )-----------( 188      ( 19 Apr 2020 05:57 )             35.2   04-19    131<L>  |  97  |  21  ----------------------------<  106<H>  4.4   |  22  |  0.99    Ca    9.0      19 Apr 2020 05:57  PT/INR - ( 19 Apr 2020 05:57 )   PT: 20.2 sec;   INR: 1.73 ratio         PTT - ( 18 Apr 2020 07:15 )  PTT:35.4 sec                        PHYSICAL EXAM  BY HF team COVID                                  Physical Therapy Rec:   Home  [ x ]   Home w/ PT  [  ]  Rehab  [  ]    Discussed with Cardiothoracic Team at AM rounds.

## 2020-04-19 NOTE — PROGRESS NOTE ADULT - NSREFPHYEXREFTO_GEN_ALL_CORE
Inpatient Physical Exam
Other
Inpatient Physical Exam
Inpatient Physical Exam
Other
Inpatient Physical Exam

## 2020-04-19 NOTE — DISCHARGE NOTE PROVIDER - NSDCPNSUBOBJ_GEN_ALL_CORE
AXox3 NSR lung CTA AB soft nontender + BM  Driveline CDI equal strength all extremities  B/lle warm well perfused    107/76 87 16 97 36.7                            11.9     3.43  )-----------( 188      ( 19 Apr 2020 05:57 )               35.2     04-19        131<L>  |  97  |  21    ----------------------------<  106<H>    4.4   |  22  |  0.99        Ca    9.0      19 Apr 2020 05:57        PT/INR - ( 19 Apr 2020 05:57 )   PT: 20.2 sec;   INR: 1.73 ratio               PTT - ( 18 Apr 2020 07:15 )  PTT:35.4 sec     Patient seen and examined by HF team stable and eager for discharge    Greater then 45 minute sspent on discharge Subjective:        Medications:    acetaminophen   Tablet .. 650 milliGRAM(s) Oral every 6 hours PRN    aMIOdarone    Tablet   Oral     aMIOdarone    Tablet 200 milliGRAM(s) Oral daily    aspirin enteric coated 81 milliGRAM(s) Oral daily    chlorhexidine 4% Liquid 1 Application(s) Topical <User Schedule>    enoxaparin Injectable 70 milliGRAM(s) SubCutaneous every 12 hours    famotidine    Tablet 20 milliGRAM(s) Oral daily    finasteride 5 milliGRAM(s) Oral daily    guaiFENesin   Syrup  (Sugar-Free) 100 milliGRAM(s) Oral every 6 hours PRN    lisinopril 5 milliGRAM(s) Oral daily    metoprolol succinate ER 25 milliGRAM(s) Oral daily    mirtazapine 7.5 milliGRAM(s) Oral at bedtime    polyethylene glycol 3350 17 Gram(s) Oral daily    senna 2 Tablet(s) Oral at bedtime    sodium chloride 0.9% lock flush 3 milliLiter(s) IV Push every 8 hours    tamsulosin 0.4 milliGRAM(s) Oral at bedtime        Vitals:    Vital Signs Last 24 Hours    T(C): 36.6 (04-20-20 @ 05:03), Max: 36.8 (04-19-20 @ 21:15)    HR: 90 (04-20-20 @ 05:03) (86 - 92)    BP: --    RR: 18 (04-20-20 @ 10:01) (16 - 18)    SpO2: 96% (04-20-20 @ 10:01) (96% - 98%)        Tele: 87-97                I&O's Summary        19 Apr 2020 07:01  -  20 Apr 2020 07:00    --------------------------------------------------------    IN: 480 mL / OUT: 350 mL / NET: 130 mL        20 Apr 2020 07:01  -  20 Apr 2020 10:08    --------------------------------------------------------    IN: 0 mL / OUT: 250 mL / NET: -250 mL            General: No distress. Comfortable.    HEENT: EOM intact.    Neck: Neck supple. JVP not elevated. No masses    Chest: Clear to auscultation bilaterally    CV: +VAD hum    Abdomen: Soft, non-distended, non-tender    Skin: No rashes or skin breakdown, driveline dressing c/d/i    Neurology: Alert and oriented times three. Sensation intact    Psych: Affect normal        LVAD Interrogation    VAD TYPE HM2     Speed 8800    Flow 4.7    Power 4.9    PI 4.9    Assessment of driveline exit site dressing c/d/i    Programming changes: no changes made        Labs:                            11.4     3.73  )-----------( 175      ( 20 Apr 2020 06:47 )               34.0         04-20        131<L>  |  97  |  17    ----------------------------<  104<H>    4.5   |  23  |  0.96        Ca    8.7      20 Apr 2020 06:47        TPro  6.9  /  Alb  3.5  /  TBili  0.2  /  DBili  x   /  AST  57<H>  /  ALT  76<H>  /  AlkPhos  59  04-20        PT/INR - ( 20 Apr 2020 08:35 )   PT: 26.6 sec;   INR: 2.29 ratio               PTT - ( 20 Apr 2020 08:35 )  PTT:41.0 sec        Lactate Dehydrogenase, Serum: 245 U/L (04-20 @ 06:47)    Lactate Dehydrogenase, Serum: 252 U/L (04-19 @ 05:57)    Lactate Dehydrogenase, Serum: 281 U/L (04-18 @ 07:15) Subjective: Patient seen and examined resting in bed. ON no issues, eager to go home today        Medications:    acetaminophen   Tablet .. 650 milliGRAM(s) Oral every 6 hours PRN    aMIOdarone    Tablet   Oral     aMIOdarone    Tablet 200 milliGRAM(s) Oral daily    aspirin enteric coated 81 milliGRAM(s) Oral daily    chlorhexidine 4% Liquid 1 Application(s) Topical <User Schedule>    enoxaparin Injectable 70 milliGRAM(s) SubCutaneous every 12 hours    famotidine    Tablet 20 milliGRAM(s) Oral daily    finasteride 5 milliGRAM(s) Oral daily    guaiFENesin   Syrup  (Sugar-Free) 100 milliGRAM(s) Oral every 6 hours PRN    lisinopril 5 milliGRAM(s) Oral daily    metoprolol succinate ER 25 milliGRAM(s) Oral daily    mirtazapine 7.5 milliGRAM(s) Oral at bedtime    polyethylene glycol 3350 17 Gram(s) Oral daily    senna 2 Tablet(s) Oral at bedtime    sodium chloride 0.9% lock flush 3 milliLiter(s) IV Push every 8 hours    tamsulosin 0.4 milliGRAM(s) Oral at bedtime        Vitals:    Vital Signs Last 24 Hours    T(C): 36.6 (04-20-20 @ 05:03), Max: 36.8 (04-19-20 @ 21:15)    HR: 90 (04-20-20 @ 05:03) (86 - 92)    BP: --    RR: 18 (04-20-20 @ 10:01) (16 - 18)    SpO2: 96% (04-20-20 @ 10:01) (96% - 98%)        Tele: 87-97        I&O's Summary        19 Apr 2020 07:01  -  20 Apr 2020 07:00    --------------------------------------------------------    IN: 480 mL / OUT: 350 mL / NET: 130 mL        20 Apr 2020 07:01  -  20 Apr 2020 10:08    --------------------------------------------------------    IN: 0 mL / OUT: 250 mL / NET: -250 mL            General: No distress. Comfortable.    HEENT: EOM intact.    Neck: Neck supple. JVP not elevated. No masses    Chest: Clear to auscultation bilaterally    CV: +VAD hum    Abdomen: Soft, non-distended, non-tender    Skin: No rashes or skin breakdown, driveline dressing c/d/i    Neurology: Alert and oriented times three. Sensation intact    Psych: Affect normal        LVAD Interrogation    VAD TYPE HM2     Speed 8800    Flow 4.7    Power 4.9    PI 4.9    Assessment of driveline exit site dressing c/d/i    Programming changes: no changes made        Labs:                            11.4     3.73  )-----------( 175      ( 20 Apr 2020 06:47 )               34.0         04-20        131<L>  |  97  |  17    ----------------------------<  104<H>    4.5   |  23  |  0.96        Ca    8.7      20 Apr 2020 06:47        TPro  6.9  /  Alb  3.5  /  TBili  0.2  /  DBili  x   /  AST  57<H>  /  ALT  76<H>  /  AlkPhos  59  04-20        PT/INR - ( 20 Apr 2020 08:35 )   PT: 26.6 sec;   INR: 2.29 ratio               PTT - ( 20 Apr 2020 08:35 )  PTT:41.0 sec        Lactate Dehydrogenase, Serum: 245 U/L (04-20 @ 06:47)    Lactate Dehydrogenase, Serum: 252 U/L (04-19 @ 05:57)    Lactate Dehydrogenase, Serum: 281 U/L (04-18 @ 07:15)

## 2020-04-19 NOTE — DISCHARGE NOTE PROVIDER - NSDCCPCAREPLAN_GEN_ALL_CORE_FT
PRINCIPAL DISCHARGE DIAGNOSIS  Diagnosis: COVID-19 virus infection  Assessment and Plan of Treatment: Completed  Plaquenil course  wear face mask   social distancing  do not allow visitors in your home  monitor temperature  maintain hydration   call our office for fever chills or any concerns  [phone]      SECONDARY DISCHARGE DIAGNOSES  Diagnosis: LVAD (left ventricular assist device) present  Assessment and Plan of Treatment: Take all medications as prescribed   Coumadin 5 mg daily dosed by INR  Tamanna Mcclendon will follow up with you tommorrow Monday 4/20  weigh yourself daily   call LVAD team with weigh gain greter then 3 lbs 48 hours  change driveline dressing - sterile procedure- per protocol   Call our office for fever chills or any concerns [phone]

## 2020-04-19 NOTE — DISCHARGE NOTE PROVIDER - NSDCMRMEDTOKEN_GEN_ALL_CORE_FT
acetaminophen 325 mg oral tablet: 2 tab(s) orally every 6 hours, As needed, Temp greater or equal to 38C (100.4F), Moderate Pain (4 - 6)  amiodarone 200 mg oral tablet: 1 tab(s) orally once a day   aspirin 81 mg oral delayed release tablet: 1 tab(s) orally once a day  Coumadin 5 mg oral tablet: 1 tab(s) orally once a day Goal INR 2-3 followe nancyy Hakan  0867  famotidine 20 mg oral tablet: 1 tab(s) orally once a day  finasteride 5 mg oral tablet: 1 tab(s) orally once a day  guaiFENesin 100 mg/5 mL oral liquid: 5 milliliter(s) orally every 6 hours, As needed, Cough  lisinopril 5 mg oral tablet: 1 tab(s) orally once a day  metoprolol succinate 25 mg oral tablet, extended release: 1 tab(s) orally once a day  mirtazapine 7.5 mg oral tablet: 1 tab(s) orally once a day (at bedtime)  polyethylene glycol 3350 oral powder for reconstitution: 17 gram(s) orally once a day  senna oral tablet: 2 tab(s) orally once a day (at bedtime) PRN  tamsulosin 0.4 mg oral capsule: 1 cap(s) orally once a day (at bedtime) acetaminophen 325 mg oral tablet: 2 tab(s) orally every 6 hours, As needed, Temp greater or equal to 38C (100.4F), Moderate Pain (4 - 6)  amiodarone 200 mg oral tablet: 1 tab(s) orally once a day   aspirin 81 mg oral delayed release tablet: 1 tab(s) orally once a day  Coumadin 5 mg oral tablet: 1 tab(s) orally once a day Goal INR 2-3 followe nancyy Hakan  9409  famotidine 20 mg oral tablet: 1 tab(s) orally once a day  finasteride 5 mg oral tablet: 1 tab(s) orally once a day  guaiFENesin 100 mg/5 mL oral liquid: 5 milliliter(s) orally every 6 hours, As needed, Cough  lisinopril 5 mg oral tablet: 1 tab(s) orally once a day  metoprolol succinate 25 mg oral tablet, extended release: 1 tab(s) orally once a day  mirtazapine 7.5 mg oral tablet: 1 tab(s) orally once a day (at bedtime)  polyethylene glycol 3350 oral powder for reconstitution: 17 gram(s) orally once a day  senna oral tablet: 2 tab(s) orally once a day (at bedtime) PRN  tamsulosin 0.4 mg oral capsule: 1 cap(s) orally once a day (at bedtime) acetaminophen 325 mg oral tablet: 2 tab(s) orally every 6 hours, As needed, Temp greater or equal to 38C (100.4F), Moderate Pain (4 - 6)  amiodarone 200 mg oral tablet: 1 tab(s) orally once a day   aspirin 81 mg oral delayed release tablet: 1 tab(s) orally once a day  Coumadin 5 mg oral tablet: 1 tab(s) orally once a day Goal INR 2-3 followe nancyy Hakan  3941  famotidine 20 mg oral tablet: 1 tab(s) orally once a day  finasteride 5 mg oral tablet: 1 tab(s) orally once a day  guaiFENesin 100 mg/5 mL oral liquid: 5 milliliter(s) orally every 6 hours, As needed, Cough  lisinopril 5 mg oral tablet: 1 tab(s) orally once a day  metoprolol succinate 25 mg oral tablet, extended release: 1 tab(s) orally once a day  mirtazapine 7.5 mg oral tablet: 1 tab(s) orally once a day (at bedtime)  polyethylene glycol 3350 oral powder for reconstitution: 17 gram(s) orally once a day  senna oral tablet: 2 tab(s) orally once a day (at bedtime) PRN  tamsulosin 0.4 mg oral capsule: 1 cap(s) orally once a day (at bedtime) acetaminophen 325 mg oral tablet: 2 tab(s) orally every 6 hours, As needed, Temp greater or equal to 38C (100.4F), Moderate Pain (4 - 6)  amiodarone 200 mg oral tablet: 1 tab(s) orally once a day   aspirin 81 mg oral delayed release tablet: 1 tab(s) orally once a day  Coumadin 5 mg oral tablet: 1 tab(s) orally once a day Goal INR 2-3 followe nancyy Hakan  6802  famotidine 20 mg oral tablet: 1 tab(s) orally once a day  finasteride 5 mg oral tablet: 1 tab(s) orally once a day  guaiFENesin 100 mg/5 mL oral liquid: 5 milliliter(s) orally every 6 hours, As needed, Cough  lisinopril 5 mg oral tablet: 1 tab(s) orally once a day  metoprolol succinate 25 mg oral tablet, extended release: 1 tab(s) orally once a day  mirtazapine 7.5 mg oral tablet: 1 tab(s) orally once a day (at bedtime)  polyethylene glycol 3350 oral powder for reconstitution: 17 gram(s) orally once a day  senna oral tablet: 2 tab(s) orally once a day (at bedtime) PRN  tamsulosin 0.4 mg oral capsule: 1 cap(s) orally once a day (at bedtime)

## 2020-04-19 NOTE — DISCHARGE NOTE PROVIDER - CARE PROVIDER_API CALL
Tamanna Mcclendon (NP; RN)  NP in Adult Health  [address] [place] [id number]  Phone: [phone]  Fax: [phone]  Follow Up Time: Routine

## 2020-04-20 ENCOUNTER — TRANSCRIPTION ENCOUNTER (OUTPATIENT)
Age: 65
End: 2020-04-20

## 2020-04-20 VITALS
DIASTOLIC BLOOD PRESSURE: 70 MMHG | HEART RATE: 97 BPM | SYSTOLIC BLOOD PRESSURE: 103 MMHG | RESPIRATION RATE: 18 BRPM | OXYGEN SATURATION: 96 % | TEMPERATURE: 98 F

## 2020-04-20 DIAGNOSIS — R74.0 NONSPECIFIC ELEVATION OF LEVELS OF TRANSAMINASE AND LACTIC ACID DEHYDROGENASE [LDH]: ICD-10-CM

## 2020-04-20 LAB
ALBUMIN SERPL ELPH-MCNC: 3.5 G/DL — SIGNIFICANT CHANGE UP (ref 3.3–5)
ALP SERPL-CCNC: 59 U/L — SIGNIFICANT CHANGE UP (ref 40–120)
ALT FLD-CCNC: 76 U/L — HIGH (ref 10–45)
ANION GAP SERPL CALC-SCNC: 11 MMOL/L — SIGNIFICANT CHANGE UP (ref 5–17)
APTT BLD: 41 SEC — HIGH (ref 27.5–36.3)
AST SERPL-CCNC: 57 U/L — HIGH (ref 10–40)
BILIRUB SERPL-MCNC: 0.2 MG/DL — SIGNIFICANT CHANGE UP (ref 0.2–1.2)
BUN SERPL-MCNC: 17 MG/DL — SIGNIFICANT CHANGE UP (ref 7–23)
CALCIUM SERPL-MCNC: 8.7 MG/DL — SIGNIFICANT CHANGE UP (ref 8.4–10.5)
CHLORIDE SERPL-SCNC: 97 MMOL/L — SIGNIFICANT CHANGE UP (ref 96–108)
CO2 SERPL-SCNC: 23 MMOL/L — SIGNIFICANT CHANGE UP (ref 22–31)
CREAT SERPL-MCNC: 0.96 MG/DL — SIGNIFICANT CHANGE UP (ref 0.5–1.3)
CRP SERPL-MCNC: 0.18 MG/DL — SIGNIFICANT CHANGE UP (ref 0–0.4)
FERRITIN SERPL-MCNC: 212 NG/ML — SIGNIFICANT CHANGE UP (ref 30–400)
GLUCOSE SERPL-MCNC: 104 MG/DL — HIGH (ref 70–99)
HCT VFR BLD CALC: 34 % — LOW (ref 39–50)
HGB BLD-MCNC: 11.4 G/DL — LOW (ref 13–17)
INR BLD: 2.29 RATIO — HIGH (ref 0.88–1.16)
LDH SERPL L TO P-CCNC: 245 U/L — HIGH (ref 50–242)
MCHC RBC-ENTMCNC: 29.2 PG — SIGNIFICANT CHANGE UP (ref 27–34)
MCHC RBC-ENTMCNC: 33.5 GM/DL — SIGNIFICANT CHANGE UP (ref 32–36)
MCV RBC AUTO: 87.2 FL — SIGNIFICANT CHANGE UP (ref 80–100)
NRBC # BLD: 0 /100 WBCS — SIGNIFICANT CHANGE UP (ref 0–0)
PLATELET # BLD AUTO: 175 K/UL — SIGNIFICANT CHANGE UP (ref 150–400)
POTASSIUM SERPL-MCNC: 4.5 MMOL/L — SIGNIFICANT CHANGE UP (ref 3.5–5.3)
POTASSIUM SERPL-SCNC: 4.5 MMOL/L — SIGNIFICANT CHANGE UP (ref 3.5–5.3)
PROT SERPL-MCNC: 6.9 G/DL — SIGNIFICANT CHANGE UP (ref 6–8.3)
PROTHROM AB SERPL-ACNC: 26.6 SEC — HIGH (ref 10–13.1)
RBC # BLD: 3.9 M/UL — LOW (ref 4.2–5.8)
RBC # FLD: 14.1 % — SIGNIFICANT CHANGE UP (ref 10.3–14.5)
SODIUM SERPL-SCNC: 131 MMOL/L — LOW (ref 135–145)
WBC # BLD: 3.73 K/UL — LOW (ref 3.8–10.5)
WBC # FLD AUTO: 3.73 K/UL — LOW (ref 3.8–10.5)

## 2020-04-20 PROCEDURE — 71045 X-RAY EXAM CHEST 1 VIEW: CPT

## 2020-04-20 PROCEDURE — 82803 BLOOD GASES ANY COMBINATION: CPT

## 2020-04-20 PROCEDURE — 83735 ASSAY OF MAGNESIUM: CPT

## 2020-04-20 PROCEDURE — 93321 DOPPLER ECHO F-UP/LMTD STD: CPT

## 2020-04-20 PROCEDURE — 82435 ASSAY OF BLOOD CHLORIDE: CPT

## 2020-04-20 PROCEDURE — 84484 ASSAY OF TROPONIN QUANT: CPT

## 2020-04-20 PROCEDURE — 82550 ASSAY OF CK (CPK): CPT

## 2020-04-20 PROCEDURE — 82570 ASSAY OF URINE CREATININE: CPT

## 2020-04-20 PROCEDURE — 87635 SARS-COV-2 COVID-19 AMP PRB: CPT

## 2020-04-20 PROCEDURE — 71250 CT THORAX DX C-: CPT

## 2020-04-20 PROCEDURE — 97162 PT EVAL MOD COMPLEX 30 MIN: CPT

## 2020-04-20 PROCEDURE — 82955 ASSAY OF G6PD ENZYME: CPT

## 2020-04-20 PROCEDURE — 82330 ASSAY OF CALCIUM: CPT

## 2020-04-20 PROCEDURE — 81001 URINALYSIS AUTO W/SCOPE: CPT

## 2020-04-20 PROCEDURE — 84132 ASSAY OF SERUM POTASSIUM: CPT

## 2020-04-20 PROCEDURE — 83690 ASSAY OF LIPASE: CPT

## 2020-04-20 PROCEDURE — 82728 ASSAY OF FERRITIN: CPT

## 2020-04-20 PROCEDURE — 82947 ASSAY GLUCOSE BLOOD QUANT: CPT

## 2020-04-20 PROCEDURE — 85610 PROTHROMBIN TIME: CPT

## 2020-04-20 PROCEDURE — 97116 GAIT TRAINING THERAPY: CPT

## 2020-04-20 PROCEDURE — 99285 EMERGENCY DEPT VISIT HI MDM: CPT

## 2020-04-20 PROCEDURE — 85027 COMPLETE CBC AUTOMATED: CPT

## 2020-04-20 PROCEDURE — 84145 PROCALCITONIN (PCT): CPT

## 2020-04-20 PROCEDURE — 70450 CT HEAD/BRAIN W/O DYE: CPT

## 2020-04-20 PROCEDURE — 85730 THROMBOPLASTIN TIME PARTIAL: CPT

## 2020-04-20 PROCEDURE — 83520 IMMUNOASSAY QUANT NOS NONAB: CPT

## 2020-04-20 PROCEDURE — 83935 ASSAY OF URINE OSMOLALITY: CPT

## 2020-04-20 PROCEDURE — 99232 SBSQ HOSP IP/OBS MODERATE 35: CPT

## 2020-04-20 PROCEDURE — 82248 BILIRUBIN DIRECT: CPT

## 2020-04-20 PROCEDURE — 80048 BASIC METABOLIC PNL TOTAL CA: CPT

## 2020-04-20 PROCEDURE — 82553 CREATINE MB FRACTION: CPT

## 2020-04-20 PROCEDURE — 83615 LACTATE (LD) (LDH) ENZYME: CPT

## 2020-04-20 PROCEDURE — 86360 T CELL ABSOLUTE COUNT/RATIO: CPT

## 2020-04-20 PROCEDURE — 97530 THERAPEUTIC ACTIVITIES: CPT

## 2020-04-20 PROCEDURE — 82962 GLUCOSE BLOOD TEST: CPT

## 2020-04-20 PROCEDURE — 85379 FIBRIN DEGRADATION QUANT: CPT

## 2020-04-20 PROCEDURE — 74176 CT ABD & PELVIS W/O CONTRAST: CPT

## 2020-04-20 PROCEDURE — 83880 ASSAY OF NATRIURETIC PEPTIDE: CPT

## 2020-04-20 PROCEDURE — 86140 C-REACTIVE PROTEIN: CPT

## 2020-04-20 PROCEDURE — 80076 HEPATIC FUNCTION PANEL: CPT

## 2020-04-20 PROCEDURE — 84560 ASSAY OF URINE/URIC ACID: CPT

## 2020-04-20 PROCEDURE — 93005 ELECTROCARDIOGRAM TRACING: CPT

## 2020-04-20 PROCEDURE — 84295 ASSAY OF SERUM SODIUM: CPT

## 2020-04-20 PROCEDURE — 84300 ASSAY OF URINE SODIUM: CPT

## 2020-04-20 PROCEDURE — 85014 HEMATOCRIT: CPT

## 2020-04-20 PROCEDURE — 93308 TTE F-UP OR LMTD: CPT

## 2020-04-20 PROCEDURE — 85652 RBC SED RATE AUTOMATED: CPT

## 2020-04-20 PROCEDURE — 87040 BLOOD CULTURE FOR BACTERIA: CPT

## 2020-04-20 PROCEDURE — 97110 THERAPEUTIC EXERCISES: CPT

## 2020-04-20 PROCEDURE — 87086 URINE CULTURE/COLONY COUNT: CPT

## 2020-04-20 PROCEDURE — 83605 ASSAY OF LACTIC ACID: CPT

## 2020-04-20 PROCEDURE — 84100 ASSAY OF PHOSPHORUS: CPT

## 2020-04-20 PROCEDURE — 80053 COMPREHEN METABOLIC PANEL: CPT

## 2020-04-20 RX ORDER — LISINOPRIL 2.5 MG/1
1 TABLET ORAL
Qty: 30 | Refills: 0
Start: 2020-04-20 | End: 2020-05-19

## 2020-04-20 RX ORDER — METOPROLOL TARTRATE 50 MG
1 TABLET ORAL
Qty: 30 | Refills: 0
Start: 2020-04-20 | End: 2020-05-19

## 2020-04-20 RX ORDER — WARFARIN SODIUM 2.5 MG/1
1 TABLET ORAL
Qty: 30 | Refills: 0
Start: 2020-04-20 | End: 2020-05-19

## 2020-04-20 RX ORDER — METOPROLOL TARTRATE 50 MG
1 TABLET ORAL
Qty: 30 | Refills: 0
Start: 2020-04-20 | End: 2020-05-24

## 2020-04-20 RX ORDER — AMIODARONE HYDROCHLORIDE 400 MG/1
1 TABLET ORAL
Qty: 30 | Refills: 0
Start: 2020-04-20 | End: 2020-05-19

## 2020-04-20 RX ORDER — AMIODARONE HYDROCHLORIDE 400 MG/1
1 TABLET ORAL
Qty: 30 | Refills: 0
Start: 2020-04-20 | End: 2020-05-24

## 2020-04-20 RX ADMIN — FINASTERIDE 5 MILLIGRAM(S): 5 TABLET, FILM COATED ORAL at 10:01

## 2020-04-20 RX ADMIN — Medication 25 MILLIGRAM(S): at 05:10

## 2020-04-20 RX ADMIN — LISINOPRIL 5 MILLIGRAM(S): 2.5 TABLET ORAL at 10:01

## 2020-04-20 RX ADMIN — SODIUM CHLORIDE 3 MILLILITER(S): 9 INJECTION INTRAMUSCULAR; INTRAVENOUS; SUBCUTANEOUS at 09:57

## 2020-04-20 RX ADMIN — ENOXAPARIN SODIUM 70 MILLIGRAM(S): 100 INJECTION SUBCUTANEOUS at 10:01

## 2020-04-20 RX ADMIN — CHLORHEXIDINE GLUCONATE 1 APPLICATION(S): 213 SOLUTION TOPICAL at 08:26

## 2020-04-20 RX ADMIN — AMIODARONE HYDROCHLORIDE 200 MILLIGRAM(S): 400 TABLET ORAL at 05:11

## 2020-04-20 RX ADMIN — Medication 81 MILLIGRAM(S): at 10:01

## 2020-04-20 RX ADMIN — SODIUM CHLORIDE 3 MILLILITER(S): 9 INJECTION INTRAMUSCULAR; INTRAVENOUS; SUBCUTANEOUS at 04:59

## 2020-04-20 RX ADMIN — FAMOTIDINE 20 MILLIGRAM(S): 10 INJECTION INTRAVENOUS at 10:01

## 2020-04-20 NOTE — DISCHARGE NOTE NURSING/CASE MANAGEMENT/SOCIAL WORK - PATIENT PORTAL LINK FT
You can access the FollowMyHealth Patient Portal offered by [facility] by registering at the following website: [email or web address]. By joining [employer]’s FollowMyHealth portal, you will also be able to view your health information using other applications (apps) compatible with our system.

## 2020-04-20 NOTE — PROGRESS NOTE ADULT - ASSESSMENT
64 year old man with ACC/AHA stage D HF due to NICM, s/p HM2 LVAD  in 9/12/17 as destination therapy due to severe peripheral artery disease with significant stenosis. He currently presents with COVID-19 accompanied by abdominal pain, diarrhea and acute renal failure. He completed a 5 day course of Plaquenil on 4/5, remains afebrile. Currently voiding freely, however did experience urinary rentention on 4/6 which required aleman to be reinserted. He is tolerating low dose neurohormonals with MAPs in the 70s. His RASHAWN has resolved and remains stable. INR therapeutic @ 2.29. Plan to discharge home today and have outpatient follow up.  He will have labs completed on Wednesday 4/22 (PT/INR/LDH/CMP/hepatic panel He is without evidence of LVAD malfunction

## 2020-04-20 NOTE — PROGRESS NOTE ADULT - ATTENDING COMMENTS
63 YO M with a history of ACC/AHA stage D HF due to NICM s/p HM2 LVAD on 9/12/17 as destination therapy (severe PAD) who was admitted with 4/2 with weakness, chills, and abdominal pain and found to have COVID-19 infection. He has never required supplemental O2 and completed a 5 day course of plaquinel and has downtrending inflammatory markers. Course notable for VT 4/9 for which he was started on amiodarone and RASHAWN due to hypovolemia which has resolved. Course also notable for transient episode of AMS 4/14 with CT head normal and now resolved. He was awaiting therapeutic INR for discharge and INR 2.3 today. He is without complaints  - continue metoprolol succinate 25 mg daily and lisinopril 5 mg daily, MAP at goal  - continue to hold lasix, appears euvolemic and renal function has normalized  - continue coumadin, goal INR 2-3. continue ASA.  - LFT's mildly elevated today, potentially related to amiodarone. will need to be followed as outpatient

## 2020-04-20 NOTE — PROGRESS NOTE ADULT - PROBLEM SELECTOR PROBLEM 4
Acute renal failure, unspecified acute renal failure type
Constipation
LVAD (left ventricular assist device) present

## 2020-04-20 NOTE — PROGRESS NOTE ADULT - PROBLEM SELECTOR PROBLEM 1
Coronavirus infection, unspecified
Pneumonia, viral
Coronavirus infection, unspecified
Pneumonia, viral
Pneumonia, viral

## 2020-04-20 NOTE — PROGRESS NOTE ADULT - SUBJECTIVE AND OBJECTIVE BOX
Subjective: Patient seen and examined resting in bed. ON no issues, eager to go home today. Denies any n/v/f/c/d.     Medications:  acetaminophen   Tablet .. 650 milliGRAM(s) Oral every 6 hours PRN  aMIOdarone    Tablet   Oral   aMIOdarone    Tablet 200 milliGRAM(s) Oral daily  aspirin enteric coated 81 milliGRAM(s) Oral daily  chlorhexidine 4% Liquid 1 Application(s) Topical <User Schedule>  enoxaparin Injectable 70 milliGRAM(s) SubCutaneous every 12 hours  famotidine    Tablet 20 milliGRAM(s) Oral daily  finasteride 5 milliGRAM(s) Oral daily  guaiFENesin   Syrup  (Sugar-Free) 100 milliGRAM(s) Oral every 6 hours PRN  lisinopril 5 milliGRAM(s) Oral daily  metoprolol succinate ER 25 milliGRAM(s) Oral daily  mirtazapine 7.5 milliGRAM(s) Oral at bedtime  polyethylene glycol 3350 17 Gram(s) Oral daily  senna 2 Tablet(s) Oral at bedtime  sodium chloride 0.9% lock flush 3 milliLiter(s) IV Push every 8 hours  tamsulosin 0.4 milliGRAM(s) Oral at bedtime    Vitals:  Vital Signs Last 24 Hours  T(C): 36.6 (04-20-20 @ 05:03), Max: 36.8 (04-19-20 @ 21:15)  HR: 90 (04-20-20 @ 05:03) (86 - 92)  BP: --  RR: 18 (04-20-20 @ 10:01) (16 - 18)  SpO2: 96% (04-20-20 @ 10:01) (96% - 98%)    Tele: 87-97    I&O's Summary    19 Apr 2020 07:01  -  20 Apr 2020 07:00  --------------------------------------------------------  IN: 480 mL / OUT: 350 mL / NET: 130 mL    20 Apr 2020 07:01  -  20 Apr 2020 10:08  --------------------------------------------------------  IN: 0 mL / OUT: 250 mL / NET: -250 mL      General: No distress. Comfortable.  HEENT: EOM intact.  Neck: Neck supple. JVP not elevated. No masses  Chest: Clear to auscultation bilaterally  CV: +VAD hum  Abdomen: Soft, non-distended, non-tender  Skin: No rashes or skin breakdown, driveline dressing c/d/i  Neurology: Alert and oriented times three. Sensation intact  Psych: Affect normal    LVAD Interrogation  VAD TYPE HM2   Speed 8800  Flow 4.7  Power 4.9  PI 4.9  Assessment of driveline exit site dressing c/d/i  Programming changes: no changes made    Labs:                        11.4   3.73  )-----------( 175      ( 20 Apr 2020 06:47 )             34.0     04-20    131<L>  |  97  |  17  ----------------------------<  104<H>  4.5   |  23  |  0.96    Ca    8.7      20 Apr 2020 06:47    TPro  6.9  /  Alb  3.5  /  TBili  0.2  /  DBili  x   /  AST  57<H>  /  ALT  76<H>  /  AlkPhos  59  04-20    PT/INR - ( 20 Apr 2020 08:35 )   PT: 26.6 sec;   INR: 2.29 ratio         PTT - ( 20 Apr 2020 08:35 )  PTT:41.0 sec    Lactate Dehydrogenase, Serum: 245 U/L (04-20 @ 06:47)  Lactate Dehydrogenase, Serum: 252 U/L (04-19 @ 05:57)  Lactate Dehydrogenase, Serum: 281 U/L (04-18 @ 07:15)

## 2020-04-20 NOTE — PROGRESS NOTE ADULT - PROBLEM SELECTOR PROBLEM 2
ACC/AHA stage D systolic heart failure
Acute renal failure, unspecified acute renal failure type
LVAD (left ventricular assist device) present
Acute renal failure, unspecified acute renal failure type
Acute renal failure, unspecified acute renal failure type

## 2020-04-21 ENCOUNTER — INPATIENT (INPATIENT)
Facility: HOSPITAL | Age: 65
LOS: 2 days | Discharge: HOME HEALTH | DRG: 312 | End: 2020-04-24
Attending: THORACIC SURGERY (CARDIOTHORACIC VASCULAR SURGERY) | Admitting: THORACIC SURGERY (CARDIOTHORACIC VASCULAR SURGERY)
Payer: MEDICARE

## 2020-04-21 VITALS
DIASTOLIC BLOOD PRESSURE: 71 MMHG | OXYGEN SATURATION: 98 % | RESPIRATION RATE: 18 BRPM | TEMPERATURE: 98 F | HEART RATE: 108 BPM | SYSTOLIC BLOOD PRESSURE: 97 MMHG

## 2020-04-21 DIAGNOSIS — U07.1 COVID-19: ICD-10-CM

## 2020-04-21 DIAGNOSIS — Z95.811 PRESENCE OF HEART ASSIST DEVICE: Chronic | ICD-10-CM

## 2020-04-21 DIAGNOSIS — Z98.890 OTHER SPECIFIED POSTPROCEDURAL STATES: Chronic | ICD-10-CM

## 2020-04-21 LAB
ALBUMIN SERPL ELPH-MCNC: 3.6 G/DL — SIGNIFICANT CHANGE UP (ref 3.3–5)
ALP SERPL-CCNC: 61 U/L — SIGNIFICANT CHANGE UP (ref 40–120)
ALT FLD-CCNC: 65 U/L — HIGH (ref 10–45)
ANION GAP SERPL CALC-SCNC: 11 MMOL/L — SIGNIFICANT CHANGE UP (ref 5–17)
AST SERPL-CCNC: 45 U/L — HIGH (ref 10–40)
BASOPHILS # BLD AUTO: 0.05 K/UL — SIGNIFICANT CHANGE UP (ref 0–0.2)
BASOPHILS NFR BLD AUTO: 0.9 % — SIGNIFICANT CHANGE UP (ref 0–2)
BILIRUB SERPL-MCNC: 0.2 MG/DL — SIGNIFICANT CHANGE UP (ref 0.2–1.2)
BUN SERPL-MCNC: 14 MG/DL — SIGNIFICANT CHANGE UP (ref 7–23)
CALCIUM SERPL-MCNC: 9 MG/DL — SIGNIFICANT CHANGE UP (ref 8.4–10.5)
CHLORIDE SERPL-SCNC: 101 MMOL/L — SIGNIFICANT CHANGE UP (ref 96–108)
CO2 SERPL-SCNC: 23 MMOL/L — SIGNIFICANT CHANGE UP (ref 22–31)
CREAT SERPL-MCNC: 1.25 MG/DL — SIGNIFICANT CHANGE UP (ref 0.5–1.3)
EOSINOPHIL # BLD AUTO: 0.11 K/UL — SIGNIFICANT CHANGE UP (ref 0–0.5)
EOSINOPHIL NFR BLD AUTO: 2 % — SIGNIFICANT CHANGE UP (ref 0–6)
GLUCOSE SERPL-MCNC: 121 MG/DL — HIGH (ref 70–99)
HCT VFR BLD CALC: 32.1 % — LOW (ref 39–50)
HGB BLD-MCNC: 10.1 G/DL — LOW (ref 13–17)
IMM GRANULOCYTES NFR BLD AUTO: 0.2 % — SIGNIFICANT CHANGE UP (ref 0–1.5)
INR BLD: 1.7 RATIO — HIGH (ref 0.88–1.16)
LYMPHOCYTES # BLD AUTO: 1.27 K/UL — SIGNIFICANT CHANGE UP (ref 1–3.3)
LYMPHOCYTES # BLD AUTO: 23.2 % — SIGNIFICANT CHANGE UP (ref 13–44)
MCHC RBC-ENTMCNC: 28.3 PG — SIGNIFICANT CHANGE UP (ref 27–34)
MCHC RBC-ENTMCNC: 31.5 GM/DL — LOW (ref 32–36)
MCV RBC AUTO: 89.9 FL — SIGNIFICANT CHANGE UP (ref 80–100)
MONOCYTES # BLD AUTO: 0.59 K/UL — SIGNIFICANT CHANGE UP (ref 0–0.9)
MONOCYTES NFR BLD AUTO: 10.8 % — SIGNIFICANT CHANGE UP (ref 2–14)
NEUTROPHILS # BLD AUTO: 3.45 K/UL — SIGNIFICANT CHANGE UP (ref 1.8–7.4)
NEUTROPHILS NFR BLD AUTO: 62.9 % — SIGNIFICANT CHANGE UP (ref 43–77)
NRBC # BLD: 0 /100 WBCS — SIGNIFICANT CHANGE UP (ref 0–0)
NT-PROBNP SERPL-SCNC: 348 PG/ML — HIGH (ref 0–300)
PLATELET # BLD AUTO: 150 K/UL — SIGNIFICANT CHANGE UP (ref 150–400)
POTASSIUM SERPL-MCNC: 4.2 MMOL/L — SIGNIFICANT CHANGE UP (ref 3.5–5.3)
POTASSIUM SERPL-SCNC: 4.2 MMOL/L — SIGNIFICANT CHANGE UP (ref 3.5–5.3)
PROT SERPL-MCNC: 7.2 G/DL — SIGNIFICANT CHANGE UP (ref 6–8.3)
PROTHROM AB SERPL-ACNC: 19.7 SEC — HIGH (ref 10–12.9)
RBC # BLD: 3.57 M/UL — LOW (ref 4.2–5.8)
RBC # FLD: 14.6 % — HIGH (ref 10.3–14.5)
SODIUM SERPL-SCNC: 135 MMOL/L — SIGNIFICANT CHANGE UP (ref 135–145)
WBC # BLD: 5.48 K/UL — SIGNIFICANT CHANGE UP (ref 3.8–10.5)
WBC # FLD AUTO: 5.48 K/UL — SIGNIFICANT CHANGE UP (ref 3.8–10.5)

## 2020-04-21 PROCEDURE — 99233 SBSQ HOSP IP/OBS HIGH 50: CPT

## 2020-04-21 PROCEDURE — 71045 X-RAY EXAM CHEST 1 VIEW: CPT | Mod: 26

## 2020-04-21 RX ORDER — METOPROLOL TARTRATE 50 MG
25 TABLET ORAL DAILY
Refills: 0 | Status: DISCONTINUED | OUTPATIENT
Start: 2020-04-21 | End: 2020-04-24

## 2020-04-21 RX ORDER — ACETAMINOPHEN 500 MG
650 TABLET ORAL EVERY 6 HOURS
Refills: 0 | Status: DISCONTINUED | OUTPATIENT
Start: 2020-04-21 | End: 2020-04-24

## 2020-04-21 RX ORDER — AMIODARONE HYDROCHLORIDE 400 MG/1
200 TABLET ORAL DAILY
Refills: 0 | Status: DISCONTINUED | OUTPATIENT
Start: 2020-04-21 | End: 2020-04-24

## 2020-04-21 RX ORDER — FAMOTIDINE 10 MG/ML
20 INJECTION INTRAVENOUS DAILY
Refills: 0 | Status: DISCONTINUED | OUTPATIENT
Start: 2020-04-21 | End: 2020-04-24

## 2020-04-21 RX ORDER — LISINOPRIL 2.5 MG/1
5 TABLET ORAL DAILY
Refills: 0 | Status: DISCONTINUED | OUTPATIENT
Start: 2020-04-21 | End: 2020-04-22

## 2020-04-21 RX ORDER — ASPIRIN/CALCIUM CARB/MAGNESIUM 324 MG
81 TABLET ORAL DAILY
Refills: 0 | Status: DISCONTINUED | OUTPATIENT
Start: 2020-04-21 | End: 2020-04-24

## 2020-04-21 RX ORDER — SENNA PLUS 8.6 MG/1
2 TABLET ORAL AT BEDTIME
Refills: 0 | Status: DISCONTINUED | OUTPATIENT
Start: 2020-04-21 | End: 2020-04-24

## 2020-04-21 RX ORDER — MIRTAZAPINE 45 MG/1
7.5 TABLET, ORALLY DISINTEGRATING ORAL DAILY
Refills: 0 | Status: DISCONTINUED | OUTPATIENT
Start: 2020-04-21 | End: 2020-04-24

## 2020-04-21 RX ORDER — SODIUM CHLORIDE 9 MG/ML
3 INJECTION INTRAMUSCULAR; INTRAVENOUS; SUBCUTANEOUS EVERY 8 HOURS
Refills: 0 | Status: DISCONTINUED | OUTPATIENT
Start: 2020-04-21 | End: 2020-04-24

## 2020-04-21 RX ORDER — TAMSULOSIN HYDROCHLORIDE 0.4 MG/1
0.4 CAPSULE ORAL AT BEDTIME
Refills: 0 | Status: DISCONTINUED | OUTPATIENT
Start: 2020-04-21 | End: 2020-04-23

## 2020-04-21 RX ORDER — POLYETHYLENE GLYCOL 3350 17 G/17G
17 POWDER, FOR SOLUTION ORAL DAILY
Refills: 0 | Status: DISCONTINUED | OUTPATIENT
Start: 2020-04-21 | End: 2020-04-24

## 2020-04-21 RX ORDER — WARFARIN SODIUM 2.5 MG/1
5 TABLET ORAL ONCE
Refills: 0 | Status: COMPLETED | OUTPATIENT
Start: 2020-04-21 | End: 2020-04-21

## 2020-04-21 RX ORDER — FINASTERIDE 5 MG/1
5 TABLET, FILM COATED ORAL DAILY
Refills: 0 | Status: DISCONTINUED | OUTPATIENT
Start: 2020-04-21 | End: 2020-04-24

## 2020-04-21 RX ADMIN — WARFARIN SODIUM 5 MILLIGRAM(S): 2.5 TABLET ORAL at 23:00

## 2020-04-21 RX ADMIN — SODIUM CHLORIDE 3 MILLILITER(S): 9 INJECTION INTRAMUSCULAR; INTRAVENOUS; SUBCUTANEOUS at 21:19

## 2020-04-21 RX ADMIN — TAMSULOSIN HYDROCHLORIDE 0.4 MILLIGRAM(S): 0.4 CAPSULE ORAL at 21:19

## 2020-04-21 RX ADMIN — SENNA PLUS 2 TABLET(S): 8.6 TABLET ORAL at 21:19

## 2020-04-21 NOTE — H&P ADULT - PROBLEM SELECTOR PLAN 1
Heart Failure consult in AM  Continue all home heart failure medication  Head CT results reviewed from [place] visit  INR 1.7; Coumadin 5mg tonight. cbc, bmp reviewed  Pending covid-19 results

## 2020-04-21 NOTE — H&P ADULT - ASSESSMENT
65 YO M with a history of ACC/AHA stage D HF due to NICM s/p HM2 LVAD on 9/12/17 as destination therapy (severe PAD) who was admitted with 4/2 with weakness, chills, and abdominal pain and found to have COVID-19 infection. He has never required supplemental O2 and completed a 5 day course of plaquinel and has downtrending inflammatory markers. Course notable for VT 4/9 for which he was started on amiodarone and RASHAWN due to hypovolemia which has resolved. Course also notable for transient episode of AMS 4/14 with CT head normal and now resolved. Pt discharged yesterday with INR 2.3  Pt s/p fall yesterday. Was seen over at [facility] 4/20/20 post fall and received 4 stitches on left eyebrow and head CT. Head CT indicative of left frontal/ left periorbital edema without any intracranial hemorrhage. Pt at this time denies any complaints. States he occasionally gets headaches and unable to open left eye due to swelling.

## 2020-04-21 NOTE — H&P ADULT - NSHPPHYSICALEXAM_GEN_ALL_CORE
General: No distress. Comfortable.  HEENT: EOM intact grossly of rt eye. Left eye with periorbital edema, unable to open eye. stitches x 4 to eyebrow area  Neck: Neck supple. JVP not elevated. No masses  Chest: Clear to auscultation bilaterally  CV: +VAD hum  Abdomen: Soft, non-distended, non-tender  Skin: No rashes or skin breakdown, driveline dressing c/d/i  Neurology: Alert and oriented times three. Sensation intact  Psych: Affect normal

## 2020-04-21 NOTE — H&P ADULT - HISTORY OF PRESENT ILLNESS
65 YO M with a history of ACC/AHA stage D HF due to NICM s/p HM2 LVAD on 9/12/17 as destination therapy (severe PAD) who was admitted with 4/2 with weakness, chills, and abdominal pain and found to have COVID-19 infection. He has never required supplemental O2 and completed a 5 day course of plaquinel and has downtrending inflammatory markers. Course notable for VT 4/9 for which he was started on amiodarone and RASHAWN due to hypovolemia which has resolved. Course also notable for transient episode of AMS 4/14 with CT head normal and now resolved. Pt discharged yesterday with INR 2.3  Pt s/p fall today. Was seen over at [facility] 65 YO M with a history of ACC/AHA stage D HF due to NICM s/p HM2 LVAD on 9/12/17 as destination therapy (severe PAD) who was admitted with 4/2 with weakness, chills, and abdominal pain and found to have COVID-19 infection. He has never required supplemental O2 and completed a 5 day course of plaquinel and has downtrending inflammatory markers. Course notable for VT 4/9 for which he was started on amiodarone and RASHAWN due to hypovolemia which has resolved. Course also notable for transient episode of AMS 4/14 with CT head normal and now resolved. Pt discharged yesterday with INR 2.3  Pt s/p fall yesterday. Was seen over at [facility] 4/20/20 post fall and received 4 stitches on left eyebrow and head CT. Head CT indicative of left frontal/ left periorbital edema without any intracranial hemorrhage. Pt at this time denies any complaints. States he occasionally gets headaches and unable to open left eye due to swelling. Denies any changes in vision, n/v/d, chest pain, sob, abdominal pain, numbness/tingling or fever.

## 2020-04-22 DIAGNOSIS — R41.82 ALTERED MENTAL STATUS, UNSPECIFIED: ICD-10-CM

## 2020-04-22 DIAGNOSIS — Z95.811 PRESENCE OF HEART ASSIST DEVICE: ICD-10-CM

## 2020-04-22 DIAGNOSIS — R55 SYNCOPE AND COLLAPSE: ICD-10-CM

## 2020-04-22 DIAGNOSIS — U07.1 COVID-19: ICD-10-CM

## 2020-04-22 LAB
A1C WITH ESTIMATED AVERAGE GLUCOSE RESULT: 5.9 % — HIGH (ref 4–5.6)
ALBUMIN SERPL ELPH-MCNC: 3.5 G/DL — SIGNIFICANT CHANGE UP (ref 3.3–5)
ALP SERPL-CCNC: 59 U/L — SIGNIFICANT CHANGE UP (ref 40–120)
ALT FLD-CCNC: 62 U/L — HIGH (ref 10–45)
ANION GAP SERPL CALC-SCNC: 10 MMOL/L — SIGNIFICANT CHANGE UP (ref 5–17)
APPEARANCE UR: CLEAR — SIGNIFICANT CHANGE UP
APTT BLD: 30.3 SEC — SIGNIFICANT CHANGE UP (ref 27.5–36.3)
AST SERPL-CCNC: 40 U/L — SIGNIFICANT CHANGE UP (ref 10–40)
BASOPHILS # BLD AUTO: 0.05 K/UL — SIGNIFICANT CHANGE UP (ref 0–0.2)
BASOPHILS NFR BLD AUTO: 1 % — SIGNIFICANT CHANGE UP (ref 0–2)
BILIRUB SERPL-MCNC: 0.2 MG/DL — SIGNIFICANT CHANGE UP (ref 0.2–1.2)
BILIRUB UR-MCNC: NEGATIVE — SIGNIFICANT CHANGE UP
BUN SERPL-MCNC: 12 MG/DL — SIGNIFICANT CHANGE UP (ref 7–23)
CALCIUM SERPL-MCNC: 8.8 MG/DL — SIGNIFICANT CHANGE UP (ref 8.4–10.5)
CHLORIDE SERPL-SCNC: 101 MMOL/L — SIGNIFICANT CHANGE UP (ref 96–108)
CO2 SERPL-SCNC: 25 MMOL/L — SIGNIFICANT CHANGE UP (ref 22–31)
COLOR SPEC: SIGNIFICANT CHANGE UP
CREAT SERPL-MCNC: 1.21 MG/DL — SIGNIFICANT CHANGE UP (ref 0.5–1.3)
DIFF PNL FLD: NEGATIVE — SIGNIFICANT CHANGE UP
EOSINOPHIL # BLD AUTO: 0.13 K/UL — SIGNIFICANT CHANGE UP (ref 0–0.5)
EOSINOPHIL NFR BLD AUTO: 2.6 % — SIGNIFICANT CHANGE UP (ref 0–6)
ESTIMATED AVERAGE GLUCOSE: 123 MG/DL — HIGH (ref 68–114)
GLUCOSE SERPL-MCNC: 110 MG/DL — HIGH (ref 70–99)
GLUCOSE UR QL: NEGATIVE — SIGNIFICANT CHANGE UP
HCT VFR BLD CALC: 31.2 % — LOW (ref 39–50)
HCV AB S/CO SERPL IA: 0.22 S/CO — SIGNIFICANT CHANGE UP (ref 0–0.99)
HCV AB SERPL-IMP: SIGNIFICANT CHANGE UP
HGB BLD-MCNC: 10.2 G/DL — LOW (ref 13–17)
IMM GRANULOCYTES NFR BLD AUTO: 0.4 % — SIGNIFICANT CHANGE UP (ref 0–1.5)
INR BLD: 1.56 RATIO — HIGH (ref 0.88–1.16)
KETONES UR-MCNC: NEGATIVE — SIGNIFICANT CHANGE UP
LEUKOCYTE ESTERASE UR-ACNC: NEGATIVE — SIGNIFICANT CHANGE UP
LYMPHOCYTES # BLD AUTO: 1.16 K/UL — SIGNIFICANT CHANGE UP (ref 1–3.3)
LYMPHOCYTES # BLD AUTO: 22.8 % — SIGNIFICANT CHANGE UP (ref 13–44)
MCHC RBC-ENTMCNC: 29.5 PG — SIGNIFICANT CHANGE UP (ref 27–34)
MCHC RBC-ENTMCNC: 32.7 GM/DL — SIGNIFICANT CHANGE UP (ref 32–36)
MCV RBC AUTO: 90.2 FL — SIGNIFICANT CHANGE UP (ref 80–100)
MONOCYTES # BLD AUTO: 0.64 K/UL — SIGNIFICANT CHANGE UP (ref 0–0.9)
MONOCYTES NFR BLD AUTO: 12.6 % — SIGNIFICANT CHANGE UP (ref 2–14)
MRSA PCR RESULT.: SIGNIFICANT CHANGE UP
NEUTROPHILS # BLD AUTO: 3.09 K/UL — SIGNIFICANT CHANGE UP (ref 1.8–7.4)
NEUTROPHILS NFR BLD AUTO: 60.6 % — SIGNIFICANT CHANGE UP (ref 43–77)
NITRITE UR-MCNC: NEGATIVE — SIGNIFICANT CHANGE UP
NRBC # BLD: 0 /100 WBCS — SIGNIFICANT CHANGE UP (ref 0–0)
PH UR: 6 — SIGNIFICANT CHANGE UP (ref 5–8)
PLATELET # BLD AUTO: 144 K/UL — LOW (ref 150–400)
POTASSIUM SERPL-MCNC: 4.5 MMOL/L — SIGNIFICANT CHANGE UP (ref 3.5–5.3)
POTASSIUM SERPL-SCNC: 4.5 MMOL/L — SIGNIFICANT CHANGE UP (ref 3.5–5.3)
PROT SERPL-MCNC: 6.9 G/DL — SIGNIFICANT CHANGE UP (ref 6–8.3)
PROT UR-MCNC: NEGATIVE — SIGNIFICANT CHANGE UP
PROTHROM AB SERPL-ACNC: 18.2 SEC — HIGH (ref 10–12.9)
RBC # BLD: 3.46 M/UL — LOW (ref 4.2–5.8)
RBC # FLD: 14.8 % — HIGH (ref 10.3–14.5)
S AUREUS DNA NOSE QL NAA+PROBE: SIGNIFICANT CHANGE UP
SARS-COV-2 RNA SPEC QL NAA+PROBE: DETECTED
SODIUM SERPL-SCNC: 136 MMOL/L — SIGNIFICANT CHANGE UP (ref 135–145)
SP GR SPEC: 1.02 — SIGNIFICANT CHANGE UP (ref 1.01–1.02)
TSH SERPL-MCNC: 3.06 UIU/ML — SIGNIFICANT CHANGE UP (ref 0.27–4.2)
UROBILINOGEN FLD QL: NEGATIVE — SIGNIFICANT CHANGE UP
WBC # BLD: 5.09 K/UL — SIGNIFICANT CHANGE UP (ref 3.8–10.5)
WBC # FLD AUTO: 5.09 K/UL — SIGNIFICANT CHANGE UP (ref 3.8–10.5)

## 2020-04-22 PROCEDURE — 99232 SBSQ HOSP IP/OBS MODERATE 35: CPT

## 2020-04-22 PROCEDURE — 70450 CT HEAD/BRAIN W/O DYE: CPT | Mod: 26

## 2020-04-22 PROCEDURE — 73030 X-RAY EXAM OF SHOULDER: CPT | Mod: 26,LT

## 2020-04-22 PROCEDURE — 99222 1ST HOSP IP/OBS MODERATE 55: CPT | Mod: GC

## 2020-04-22 PROCEDURE — 99223 1ST HOSP IP/OBS HIGH 75: CPT | Mod: 25

## 2020-04-22 PROCEDURE — 93750 INTERROGATION VAD IN PERSON: CPT

## 2020-04-22 RX ORDER — WARFARIN SODIUM 2.5 MG/1
5 TABLET ORAL ONCE
Refills: 0 | Status: COMPLETED | OUTPATIENT
Start: 2020-04-22 | End: 2020-04-22

## 2020-04-22 RX ADMIN — WARFARIN SODIUM 5 MILLIGRAM(S): 2.5 TABLET ORAL at 21:52

## 2020-04-22 RX ADMIN — FINASTERIDE 5 MILLIGRAM(S): 5 TABLET, FILM COATED ORAL at 14:12

## 2020-04-22 RX ADMIN — MIRTAZAPINE 7.5 MILLIGRAM(S): 45 TABLET, ORALLY DISINTEGRATING ORAL at 21:52

## 2020-04-22 RX ADMIN — SODIUM CHLORIDE 3 MILLILITER(S): 9 INJECTION INTRAMUSCULAR; INTRAVENOUS; SUBCUTANEOUS at 14:12

## 2020-04-22 RX ADMIN — SENNA PLUS 2 TABLET(S): 8.6 TABLET ORAL at 21:52

## 2020-04-22 RX ADMIN — POLYETHYLENE GLYCOL 3350 17 GRAM(S): 17 POWDER, FOR SOLUTION ORAL at 14:29

## 2020-04-22 RX ADMIN — FAMOTIDINE 20 MILLIGRAM(S): 10 INJECTION INTRAVENOUS at 14:12

## 2020-04-22 RX ADMIN — Medication 81 MILLIGRAM(S): at 14:12

## 2020-04-22 RX ADMIN — TAMSULOSIN HYDROCHLORIDE 0.4 MILLIGRAM(S): 0.4 CAPSULE ORAL at 21:52

## 2020-04-22 RX ADMIN — SODIUM CHLORIDE 3 MILLILITER(S): 9 INJECTION INTRAMUSCULAR; INTRAVENOUS; SUBCUTANEOUS at 21:52

## 2020-04-22 NOTE — PROGRESS NOTE ADULT - SUBJECTIVE AND OBJECTIVE BOX
VITAL SIGNS    Telemetry: NSR      Vital Signs Last 24 Hrs  T(C): 36.7 (04-22-20 @ 14:00), Max: 36.7 (04-22-20 @ 14:00)  T(F): 98 (04-22-20 @ 14:00), Max: 98 (04-22-20 @ 14:00)  HR: 98 (04-22-20 @ 14:00) (98 - 108)  BP: 97/71 (04-22-20 @ 14:00) (93/75 - 97/71)  RR: 18 (04-22-20 @ 14:00) (18 - 18)  SpO2: 98% (04-22-20 @ 14:00) (96% - 98%)           04-21 @ 07:01  -  04-22 @ 07:00  --------------------------------------------------------  IN: 300 mL / OUT: 0 mL / NET: 300 mL    04-22 @ 07:01  -  04-22 @ 16:32  --------------------------------------------------------  IN: 240 mL / OUT: 400 mL / NET: -160 mL    Daily Height in cm: 154.94 (22 Apr 2020 10:01)    Daily     PHYSICAL EXAM: In accordance with currents standards with limiting patient contact please refer to recent physical examination     acetaminophen Tablet .. 650 milliGRAM(s) Oral every 6 hours PRN  aMIOdarone  Tablet 200 milliGRAM(s) Oral daily  aspirin enteric coated 81 milliGRAM(s) Oral daily  famotidine Tablet 20 milliGRAM(s) Oral daily  finasteride 5 milliGRAM(s) Oral daily  guaiFENesin Syrup  (Sugar-Free) 100 milliGRAM(s) Oral every 6 hours PRN  metoprolol succinate ER 25 milliGRAM(s) Oral daily  mirtazapine 7.5 milliGRAM(s) Oral daily  polyethylene glycol 3350 17 Gram(s) Oral daily  senna 2 Tablet(s) Oral at bedtime  sodium chloride 0.9% lock flush 3 milliLiter(s) IV Push every 8 hours  tamsulosin 0.4 milliGRAM(s) Oral at bedtime  warfarin 5 milliGRAM(s) Oral once    Physical Therapy Rec:   Home  [ X ]   Home w/ PT  [  ]  Rehab  [  ]    Discussed with Cardiothoracic Team at AM rounds.

## 2020-04-22 NOTE — PROGRESS NOTE ADULT - ASSESSMENT
63 YO M with a history of ACC/AHA stage D HF due to NICM s/p HM2 LVAD on 9/12/17 as destination therapy (severe PAD) who was admitted with 4/2 with weakness, chills, and abdominal pain and found to have COVID-19 infection. He has never required supplemental O2 and completed a 5 day course of Plaquenil and has downtrending inflammatory markers. Course notable for VT 4/9 for which he was started on amiodarone and RASHAWN due to hypovolemia which has resolved. Course also notable for transient episode of AMS 4/14 with CT head normal and now resolved. Pt discharged yesterday with INR 2.3  Pt s/p fall yesterday. Was seen over at [facility] 4/20/20 post fall and received 4 stitches on left eyebrow and head CT. Head CT indicative of left frontal/ left periorbital edema without any intracranial hemorrhage. Pt at this time denies any complaints. States he occasionally gets headaches and unable to open left eye due to swelling.  Hospital Course:   4/22 AMS --> Repeat head CT non con.  Neurology consult called.  c/o Left shoulder pain --> 2 view Shoulder X-rays ordered.  INR sub-therapeutic 1.5; Coumadin 5 mg PO tonight.  Lisinopril D/C'd per CHF for increase perfusion.  Persistently Covid positive -->ID following.  continue with current medication regimen.

## 2020-04-22 NOTE — CONSULT NOTE ADULT - SUBJECTIVE AND OBJECTIVE BOX
64yMale c/o L shoulder pain  s/p syncopal fall 2 days ago. Patient was discharged from [facility] on 4/20 after recovering from COVID19. As he was walking down the street he suddenly felt unwell and next thing he knew he was on the ground. Patient was brought to [facility] and then transferred to [facility] as his LVAD was placed at [facility]. He is currently admitted for syncopal workup.  Patient denies numbness or tingling in the LUE. Patient denies any other injuries.    PMH:  Pleural effusion  Depression  CAD (coronary artery disease)  CHF (congestive heart failure)  No pertinent past medical history    PSH:  S/P ventricular assist device  S/P TVR (tricuspid valve repair)  No significant past surgical history    AH:    Meds: See med rec    T(C): 36.4 (04-22-20 @ 18:30)  HR: 100 (04-22-20 @ 18:30)  BP: 94/70 (04-22-20 @ 18:30)  RR: 18 (04-22-20 @ 18:30)  SpO2: 97% (04-22-20 @ 18:30)  Wt(kg): --    PE LUE:  Skin intact,    SILT axillary/med/rad/ulnar  +Motor AIN/PIN/Ulnar/Radial/Musc/Median,   +painless elbow/wrist ROM,   Mild shoulder pain with shoulder ROM  TTP over distal clavicle   WWP, brisk cap refill, soft compartments.    Secondary:  No TTP over bony landmarks, SILT BL, ROM intact BL, distal pulses palpable.    Imaging:  XR demonstrating left distal clavicle fracture         64yMale with L distal clavicle fracture     pain control  NWB LUE in sling  PT/OT  No acute surgical intervention  Please page [phone] if you have further questions  Can follow up with Dr. Rowe in 2 weeks from discharge from hospital   Ortho

## 2020-04-22 NOTE — PROGRESS NOTE ADULT - PROBLEM SELECTOR PLAN 2
- Continue Toprol XL 25mg PO daily (hold for MAPs <70)  - Discontinue Lisinopril 5 mg PO today  - Continue to hold Lasix 20mg PO QD, appears euvolemic off loop diuretic - Continue Toprol XL 25mg PO daily (hold for MAPs <70)  - Discontinue Lisinopril 5 mg PO today  - Appears euvolemic off diuretics

## 2020-04-22 NOTE — PROGRESS NOTE ADULT - PROBLEM SELECTOR PLAN 4
- Resolved, continue to monitor Creatinine - Continue amiodarone which was started on last admission

## 2020-04-22 NOTE — PROGRESS NOTE ADULT - PROBLEM SELECTOR PLAN 8
- Readmitted 4/21 for syncopal episode at home  - Neuro consulted, appreciate recs  - Repeat Head CT ordered  - Will continue to monitor MAPs for concerns of vasovagal

## 2020-04-22 NOTE — CONSULT NOTE ADULT - SUBJECTIVE AND OBJECTIVE BOX
As per chart: 65 YO M with a history of ACC/AHA stage D HF due to NICM s/p HM2 LVAD on 17 as destination therapy (severe PAD) who was admitted with  with weakness, chills, and abdominal pain and found to have COVID-19 infection. He has never required supplemental O2 and completed a 5 day course of plaquinel and has downtrending inflammatory markers. Course notable for VT  for which he was started on amiodarone and RASHAWN due to hypovolemia which has resolved. Course also notable for transient episode of AMS  with CT head normal and now resolved. Pt discharged yesterday with INR 2.3  Pt s/p fall yesterday. Was seen over at [facility] 20 post fall and received 4 stitches on left eyebrow and head CT. Head CT indicative of left frontal/ left periorbital edema without any intracranial hemorrhage. Pt at this time denies any complaints. States he occasionally gets headaches and unable to open left eye due to swelling. Denies any changes in vision, n/v/d, chest pain, sob, abdominal pain, numbness/tingling or fever. Neurology consulted for syncope, AMS.     ROS: as above    PAST MEDICAL & SURGICAL HISTORY:  Pleural effusion  Depression  CAD (coronary artery disease)  CHF (congestive heart failure)  S/P ventricular assist device  S/P TVR (tricuspid valve repair)    FAMILY HISTORY:  No pertinent family history in first degree relatives    SOCIAL HISTORY:   T/E/D:   Occupation:   Lives with:     MEDICATIONS (HOME):  Home Medications:    MEDICATIONS  (STANDING):  aMIOdarone    Tablet 200 milliGRAM(s) Oral daily  aspirin enteric coated 81 milliGRAM(s) Oral daily  famotidine    Tablet 20 milliGRAM(s) Oral daily  finasteride 5 milliGRAM(s) Oral daily  lisinopril 5 milliGRAM(s) Oral daily  metoprolol succinate ER 25 milliGRAM(s) Oral daily  mirtazapine 7.5 milliGRAM(s) Oral daily  polyethylene glycol 3350 17 Gram(s) Oral daily  senna 2 Tablet(s) Oral at bedtime  sodium chloride 0.9% lock flush 3 milliLiter(s) IV Push every 8 hours  tamsulosin 0.4 milliGRAM(s) Oral at bedtime    MEDICATIONS  (PRN):  acetaminophen   Tablet .. 650 milliGRAM(s) Oral every 6 hours PRN Temp greater or equal to 38C (100.4F), Moderate Pain (4 - 6)  guaiFENesin   Syrup  (Sugar-Free) 100 milliGRAM(s) Oral every 6 hours PRN Cough    ALLERGIES/INTOLERANCES:  Allergies  No Known Allergies    Intolerances    VITALS & EXAMINATION:  Vital Signs Last 24 Hrs  T(C): 36.5 (2020 10:01), Max: 36.5 (2020 10:01)  T(F): 97.7 (2020 10:01), Max: 97.7 (2020 10:01)  HR: 102 (2020 10:01) (102 - 108)  BP: 94/70 (2020 10:01) (93/75 - 97/71)  BP(mean): 78 (2020 10:) (78 - 81)  RR: 18 (2020 10:) (18 - 18)  SpO2: 96% (2020 10:01) (96% - 98%)    PE:  Incomplete    LABORATORY:  CBC                       10.2   5.09  )-----------( 144      ( 2020 05:14 )             31.2     Chem 04-22    136  |  101  |  12  ----------------------------<  110<H>  4.5   |  25  |  1.21    Ca    8.8      2020 05:14    TPro  6.9  /  Alb  3.5  /  TBili  0.2  /  DBili  x   /  AST  40  /  ALT  62<H>  /  AlkPhos  59  04-22    LFTs LIVER FUNCTIONS - ( 2020 05:14 )  Alb: 3.5 g/dL / Pro: 6.9 g/dL / ALK PHOS: 59 U/L / ALT: 62 U/L / AST: 40 U/L / GGT: x           Coagulopathy PT/INR - ( 2020 22:17 )   PT: 19.7 sec;   INR: 1.70 ratio           Lipid Panel   A1c   Cardiac enzymes     U/A Urinalysis Basic - ( 2020 11:09 )    Color: Light Yellow / Appearance: Clear / S.017 / pH: x  Gluc: x / Ketone: Negative  / Bili: Negative / Urobili: Negative   Blood: x / Protein: Negative / Nitrite: Negative   Leuk Esterase: Negative / RBC: x / WBC x   Sq Epi: x / Non Sq Epi: x / Bacteria: x      CSF  Immunological  Other    STUDIES & IMAGING:  Studies (EKG, EEG, EMG, etc):     Radiology (XR, CT, MR, U/S, TTE/COREY):

## 2020-04-22 NOTE — CHART NOTE - NSCHARTNOTEFT_GEN_A_CORE
Reviewed Left Shoulder x-ray finding revealed < from: Xray Shoulder 2 Views, Left (04.22.20 @ 15:35) There is a fracture at the distal aspect of the left clavicle with cephalad displacement of distal fragment. No acute displaced fracture is seen in the proximal left humerus. Finding discussed with Dr. Jacinto; Ortho consult called to follow.

## 2020-04-22 NOTE — CONSULT NOTE ADULT - SUBJECTIVE AND OBJECTIVE BOX
Patient is a 64y old  Male who presents with a chief complaint of s/p fall (2020 18:31)      HPI:  63 YO M with a history of ACC/AHA stage D HF due to NICM s/p HM2 LVAD on 17 as destination therapy (severe PAD) who was admitted with 4/ with weakness, chills, and abdominal pain and found to have COVID-19 infection. He has never required supplemental O2 and completed a 5 day course of plaquinel and has downtrending inflammatory markers. Course notable for VT  for which he was started on amiodarone and RASHAWN due to hypovolemia which has resolved. Course also notable for transient episode of AMS  with CT head normal and now resolved. Pt discharged yesterday with INR 2.3  Pt s/p fall yesterday. Was seen over at [facility] post fall and received 4 stitches on left eyebrow and head CT. Head CT indicative of left frontal/ left periorbital edema without any intracranial hemorrhage. Pt at this time denies any complaints. States he occasionally gets headaches and unable to open left eye due to swelling. Denies any changes in vision, n/v/d, chest pain, sob, abdominal pain, numbness/tingling or fever. (2020 18:31)    Above reviewed. Was followed by ID while here for COVID. He did well without need for O2 and was discharged but fell. Afebrile and normal wbc. still covid19+  ID consulted for workup and antibiotic management     PAST MEDICAL & SURGICAL HISTORY:  Pleural effusion  Depression  CAD (coronary artery disease)  CHF (congestive heart failure)  S/P ventricular assist device  S/P TVR (tricuspid valve repair)      Allergies  No Known Allergies        ANTIMICROBIALS:      MEDICATIONS  (STANDING):        OTHER MEDS: MEDICATIONS  (STANDING):  acetaminophen   Tablet .. 650 every 6 hours PRN  aMIOdarone    Tablet 200 daily  aspirin enteric coated 81 daily  famotidine    Tablet 20 daily  finasteride 5 daily  guaiFENesin   Syrup  (Sugar-Free) 100 every 6 hours PRN  lisinopril 5 daily  metoprolol succinate ER 25 daily  mirtazapine 7.5 daily  polyethylene glycol 3350 17 daily  senna 2 at bedtime  tamsulosin 0.4 at bedtime      SOCIAL HISTORY:     Lives with family and children    FAMILY HISTORY:  family member had COVID at home     REVIEW OF SYSTEMS  [  ] ROS unobtainable because:    [ X] All other systems negative except as noted below:	    Constitutional:  [ ] fever [ ] chills  [ ] weight loss  [ ] weakness  Skin:  [ ] rash [ ] phlebitis	  Eyes: [ ] icterus [ ] pain  [ ] discharge	  ENMT: [ ] sore throat  [ ] thrush [ ] ulcers [ ] exudates  Respiratory: [ ] dyspnea [ ] hemoptysis [ ] cough [ ] sputum	  Cardiovascular:  [ ] chest pain [ ] palpitations [ ] edema	  Gastrointestinal:  [ ] nausea [ ] vomiting [ ] diarrhea [ ] constipation [ ] pain	  Genitourinary:  [ ] dysuria [ ] frequency [ ] hematuria [ ] discharge [ ] flank pain  [ ] incontinence  Musculoskeletal:  [ ] myalgias [ ] arthralgias [ ] arthritis  [ ] back pain  Neurological:  [X ] headache [ ] seizures  [ ] confusion/altered mental status  Psychiatric:  [ ] anxiety [ ] depression	  Hematology/Lymphatics:  [ ] lymphadenopathy  Endocrine:  [ ] adrenal [ ] thyroid  Allergic/Immunologic:	 [ ] transplant [ ] seasonal    Vital Signs Last 24 Hrs  T(F): 97.7 (20 @ 10:01), Max: 98.4 (20 @ 04:42)    Vital Signs Last 24 Hrs  HR: 102 (20 @ 10:01) (102 - 108)  BP: 94/70 (20 @ 10:01) (93/75 - 97/71)  RR: 18 (20 @ 10:01)  SpO2: 96% (20 @ 10:01) (96% - 98%)  Wt(kg): --    PHYSICAL EXAM:  Constitutional: non-toxic, no distress  HEAD/EYES: anicteric, no conjunctival injection  ENT:  supple, no thrush  Cardiovascular:   normal S1, S2, no murmur, no edema  Respiratory:  clear BS bilaterally, no wheezes, no rales  GI:  soft, non-tender, normal bowel sounds  :  no aleman, no CVA tenderness  Musculoskeletal:  no synovitis, normal ROM  Neurologic: awake and alert, normal strength, no focal findings  Skin:  no rash, no erythema, no phlebitis  Heme/Onc: no lymphadenopathy   Psychiatric:  awake, alert, appropriate mood          WBC Count: 5.09 K/uL ( @ 05:14)  WBC Count: 5.48 K/uL ( @ 22:17)  WBC Count: 3.73 K/uL ( @ 06:47)  WBC Count: 3.43 K/uL ( @ 05:57)  WBC Count: 3.68 K/uL ( @ 05:51)  WBC Count: 4.74 K/uL ( @ 05:32)                            10.2   5.09  )-----------( 144      ( 2020 05:14 )             31.2           136  |  101  |  12  ----------------------------<  110<H>  4.5   |  25  |  1.21    Ca    8.8      2020 05:14    TPro  6.9  /  Alb  3.5  /  TBili  0.2  /  DBili  x   /  AST  40  /  ALT  62<H>  /  AlkPhos  59        Creatinine Trend: 1.21<--, 1.25<--, 0.96<--, 0.99<--, 1.10<--, 1.07<--    Urinalysis Basic - ( 2020 11:09 )    Color: Light Yellow / Appearance: Clear / S.017 / pH: x  Gluc: x / Ketone: Negative  / Bili: Negative / Urobili: Negative   Blood: x / Protein: Negative / Nitrite: Negative   Leuk Esterase: Negative / RBC: x / WBC x   Sq Epi: x / Non Sq Epi: x / Bacteria: x        MICROBIOLOGY:  COVID-19 PCR . (20 @ 21:23)    COVID-19 PCR: Detected: This test has been validated by [employer] to be accurate;  though it has not been FDA cleared/approved by the usual pathway.  As with all laboratory tests, results should be correlated with clinical  findings.  https://www.fda.gov/media/264327/download  https://www.fda.gov/media/212155/download          RADIOLOGY:  Xray Chest 1 View AP/PA (20 @ 19:14)     Impression:    The heart is normal in size. Platelike atelectasis right lower lobe. The left lung is clear. A left ventricular assisted device is present. Status post sternotomy. Patient is a 64y old  Male who presents with a chief complaint of s/p fall (2020 18:31)      HPI:  65 YO M with a history of ACC/AHA stage D HF due to NICM s/p HM2 LVAD on 17 as destination therapy (severe PAD) who was admitted with 4/ with weakness, chills, and abdominal pain and found to have COVID-19 infection. He has never required supplemental O2 and completed a 5 day course of plaquinel and has downtrending inflammatory markers. Course notable for VT  for which he was started on amiodarone and RASHAWN due to hypovolemia which has resolved. Course also notable for transient episode of AMS  with CT head normal and now resolved. Pt discharged yesterday with INR 2.3  Pt s/p fall yesterday. Was seen over at [facility] post fall and received 4 stitches on left eyebrow and head CT. Head CT indicative of left frontal/ left periorbital edema without any intracranial hemorrhage. Pt at this time denies any complaints. States he occasionally gets headaches and unable to open left eye due to swelling. Denies any changes in vision, n/v/d, chest pain, sob, abdominal pain, numbness/tingling or fever. (2020 18:31)    Above reviewed. Was followed by ID while here for COVID. He did well without need for O2 and was discharged but fell when he went to the pharmacy at [employer] with a friend. He reportedly didnt feel well before he passed out and fell. Afebrile and normal wbc. still covid19+  ID consulted for workup and antibiotic management     PAST MEDICAL & SURGICAL HISTORY:  Pleural effusion  Depression  CAD (coronary artery disease)  CHF (congestive heart failure)  S/P ventricular assist device  S/P TVR (tricuspid valve repair)      Allergies  No Known Allergies        ANTIMICROBIALS:      MEDICATIONS  (STANDING):        OTHER MEDS: MEDICATIONS  (STANDING):  acetaminophen   Tablet .. 650 every 6 hours PRN  aMIOdarone    Tablet 200 daily  aspirin enteric coated 81 daily  famotidine    Tablet 20 daily  finasteride 5 daily  guaiFENesin   Syrup  (Sugar-Free) 100 every 6 hours PRN  lisinopril 5 daily  metoprolol succinate ER 25 daily  mirtazapine 7.5 daily  polyethylene glycol 3350 17 daily  senna 2 at bedtime  tamsulosin 0.4 at bedtime      SOCIAL HISTORY:     Lives with family and children    FAMILY HISTORY:  family member had COVID at home     REVIEW OF SYSTEMS  [  ] ROS unobtainable because:    [ X] All other systems negative except as noted below:	    Constitutional:  [ ] fever [ ] chills  [ ] weight loss  [ ] weakness  Skin:  [ ] rash [ ] phlebitis	  Eyes: [ ] icterus [ ] pain  [ ] discharge	  ENMT: [ ] sore throat  [ ] thrush [ ] ulcers [ ] exudates  Respiratory: [ ] dyspnea [ ] hemoptysis [ ] cough [ ] sputum	  Cardiovascular:  [ ] chest pain [ ] palpitations [ ] edema	  Gastrointestinal:  [ ] nausea [ ] vomiting [ ] diarrhea [ ] constipation [ ] pain	  Genitourinary:  [ ] dysuria [ ] frequency [ ] hematuria [ ] discharge [ ] flank pain  [ ] incontinence  Musculoskeletal:  [ ] myalgias [ ] arthralgias [ ] arthritis  [ ] back pain  Neurological:  [X ] headache [ ] seizures  [ ] confusion/altered mental status  Psychiatric:  [ ] anxiety [ ] depression	  Hematology/Lymphatics:  [ ] lymphadenopathy  Endocrine:  [ ] adrenal [ ] thyroid  Allergic/Immunologic:	 [ ] transplant [ ] seasonal    Vital Signs Last 24 Hrs  T(F): 97.7 (20 @ 10:01), Max: 98.4 (20 @ 04:42)    Vital Signs Last 24 Hrs  HR: 102 (20 @ 10:01) (102 - 108)  BP: 94/70 (20 @ 10:01) (93/75 - 97/71)  RR: 18 (20 @ 10:01)  SpO2: 96% (20 @ 10:01) (96% - 98%)  Wt(kg): --    PHYSICAL EXAM:  Constitutional: non-toxic, no distress  HEAD/EYES: laceration over the left eye sutured, b/l raccoon eyes   ENT:  supple, no thrush  Cardiovascular:   LVAD sounds   Respiratory:  clear BS bilaterally, no wheezes, no rales  GI:  soft, non-tender, normal bowel sounds, drive line site is c/d/ but needs to changed as its starting to peel  :  no aleman, no CVA tenderness  Musculoskeletal:  no synovitis, normal ROM  Neurologic: awake and alert, normal strength, no focal findings  Skin:  no rash, no erythema, no phlebitis  Heme/Onc: no lymphadenopathy   Psychiatric:  awake, alert, appropriate mood          WBC Count: 5.09 K/uL ( @ 05:14)  WBC Count: 5.48 K/uL ( @ 22:17)  WBC Count: 3.73 K/uL ( @ 06:47)  WBC Count: 3.43 K/uL ( @ 05:57)  WBC Count: 3.68 K/uL ( @ 05:51)  WBC Count: 4.74 K/uL ( @ 05:32)                            10.2   5.09  )-----------( 144      ( 2020 05:14 )             31.2           136  |  101  |  12  ----------------------------<  110<H>  4.5   |  25  |  1.21    Ca    8.8      2020 05:14    TPro  6.9  /  Alb  3.5  /  TBili  0.2  /  DBili  x   /  AST  40  /  ALT  62<H>  /  AlkPhos  59        Creatinine Trend: 1.21<--, 1.25<--, 0.96<--, 0.99<--, 1.10<--, 1.07<--    Urinalysis Basic - ( 2020 11:09 )    Color: Light Yellow / Appearance: Clear / S.017 / pH: x  Gluc: x / Ketone: Negative  / Bili: Negative / Urobili: Negative   Blood: x / Protein: Negative / Nitrite: Negative   Leuk Esterase: Negative / RBC: x / WBC x   Sq Epi: x / Non Sq Epi: x / Bacteria: x        MICROBIOLOGY:  COVID-19 PCR . (20 @ 21:23)    COVID-19 PCR: Detected: This test has been validated by [employer] to be accurate;  though it has not been FDA cleared/approved by the usual pathway.  As with all laboratory tests, results should be correlated with clinical  findings.  https://www.fda.gov/media/052933/download  https://www.fda.gov/media/072297/download          RADIOLOGY:  Xray Chest 1 View AP/PA (20 @ 19:14)     Impression:    The heart is normal in size. Platelike atelectasis right lower lobe. The left lung is clear. A left ventricular assisted device is present. Status post sternotomy.

## 2020-04-22 NOTE — PROGRESS NOTE ADULT - PROBLEM SELECTOR PROBLEM 4
Acute renal failure, unspecified acute renal failure type NSVT (nonsustained ventricular tachycardia)

## 2020-04-22 NOTE — CONSULT NOTE ADULT - ASSESSMENT
63 YO M with a history of ACC/AHA stage D HF due to NICM s/p HM2 LVAD on 9/12/17 as destination therapy (severe PAD) who was admitted with 4/2 with weakness, chills, and abdominal pain and found to have COVID-19 infection. He has never required supplemental O2 and completed a 5 day course of plaquinel and has downtrending inflammatory markers. Course notable for VT 4/9 for which he was started on amiodarone and RASHAWN due to hypovolemia which has resolved. Course also notable for transient episode of AMS 4/14 with CT head normal and now resolved. Pt discharged yesterday with INR 2.3  Pt s/p fall yesterday. Was seen over at [facility] 4/20/20 post fall and received 4 stitches on left eyebrow and head CT. Head CT indicative of left frontal/ left periorbital edema without any intracranial hemorrhage.  afebrile, tachycardic   normal WBC  still covid19+  no obvious signs of infection on exam  awaiting repeat CT head     Recommendations:  monitor off antibiotics   trend WBC  neurologic workup as per neurology   f/u CT head   if spikes fever, please obtain 2 sets of blood culture     Discussed with NP    Bertrand Cheney DO  Pager [phone]   ID fellow, PGY 5  After 5pm/weekends call [phone]

## 2020-04-22 NOTE — PROGRESS NOTE ADULT - SUBJECTIVE AND OBJECTIVE BOX
Subjective: Patient seen and examined resting in bed. ON patient was transferred from [facility]. Today patient states he feels well but complains of tenderness at his L shoulder.     Medications:  acetaminophen   Tablet .. 650 milliGRAM(s) Oral every 6 hours PRN  aMIOdarone    Tablet 200 milliGRAM(s) Oral daily  aspirin enteric coated 81 milliGRAM(s) Oral daily  famotidine    Tablet 20 milliGRAM(s) Oral daily  finasteride 5 milliGRAM(s) Oral daily  guaiFENesin   Syrup  (Sugar-Free) 100 milliGRAM(s) Oral every 6 hours PRN  metoprolol succinate ER 25 milliGRAM(s) Oral daily  mirtazapine 7.5 milliGRAM(s) Oral daily  polyethylene glycol 3350 17 Gram(s) Oral daily  senna 2 Tablet(s) Oral at bedtime  sodium chloride 0.9% lock flush 3 milliLiter(s) IV Push every 8 hours  tamsulosin 0.4 milliGRAM(s) Oral at bedtime    Vitals:  Vital Signs Last 24 Hours  T(C): 36.5 (04-22-20 @ 10:01), Max: 36.5 (04-22-20 @ 10:01)  HR: 102 (04-22-20 @ 10:01) (102 - 108)  BP: 94/70 (04-22-20 @ 10:01) (93/75 - 97/71)  RR: 18 (04-22-20 @ 10:01) (18 - 18)  SpO2: 96% (04-22-20 @ 10:01) (96% - 98%)    Tele: 103-108    I&O's Summary    21 Apr 2020 07:01  -  22 Apr 2020 07:00  --------------------------------------------------------  IN: 300 mL / OUT: 0 mL / NET: 300 mL    22 Apr 2020 07:01  -  22 Apr 2020 14:32  --------------------------------------------------------  IN: 120 mL / OUT: 400 mL / NET: -280 mL      Physical Exam;  General: No distress. Comfortable.  HEENT: EOM intact grossly of rt eye. Left eye with periorbital edema, unable to open eye.  Neck: Neck supple. JVP not elevated. No masses  Chest: Clear to auscultation bilaterally  CV: +VAD hum  Abdomen: Soft, non-distended, non-tender  Skin: No rashes or skin breakdown  Neurology: Alert and oriented times three. Sensation intact  Psych: Affect normal    LVAD Interrogation  VAD TYPE HM2  Speed 8800  Flow 5.3  Power 5  PI 6.9  Assessment of driveline exit site: dressing c/d/i  Programming changes: no changes made    Labs:                        10.2   5.09  )-----------( 144      ( 22 Apr 2020 05:14 )             31.2     04-22    136  |  101  |  12  ----------------------------<  110<H>  4.5   |  25  |  1.21    Ca    8.8      22 Apr 2020 05:14    TPro  6.9  /  Alb  3.5  /  TBili  0.2  /  DBili  x   /  AST  40  /  ALT  62<H>  /  AlkPhos  59  04-22    PT/INR - ( 21 Apr 2020 22:17 )   PT: 19.7 sec;   INR: 1.70 ratio      Serum Pro-Brain Natriuretic Peptide: 348 pg/mL (04-21 @ 22:17)      Lactate Dehydrogenase, Serum: 245 U/L (04-20 @ 06:47)        Imaging Studies     < from: Xray Chest 1 View AP/PA (04.21.20 @ 19:14) >  Impression:  The heart is normal in size. Platelike atelectasis right lower lobe. The left lung is clear. A left ventricular assisted device is present. Status post sternotomy.  < end of copied text >

## 2020-04-22 NOTE — PROGRESS NOTE ADULT - PROBLEM SELECTOR PLAN 5
- Noted to be in VT lasting >1 min on previous admission spontaneous conversion  - Continue Amiodarone 200 mg daily (initiated 4/7) - COVID-19 on last admission, continue airborne isolation

## 2020-04-22 NOTE — CONSULT NOTE ADULT - ATTENDING COMMENTS
Patient seen and examined with Dr. Cheney  I agree with his history exam and plans as noted above    Patient readmitted after feeling dizzy in the parking lot of a [employer] store and slumping to the ground  No fevers or chills  no witnessed seizure movements  remains on RA and COVID symptoms have improved since his prior hospitalization  Suspect cardiac or neurologic cause of his symptoms    Plans as above    Morris Prater MD  [phone]  After 5pm/weekends [phone]
Case discussed and reviewed with neurology resident. Agree with assessment and plan as above. If there are any changes in patient's status please call back.

## 2020-04-22 NOTE — PROGRESS NOTE ADULT - PROBLEM SELECTOR PLAN 1
Heart Failure following   D/C Lisinopril   Continue on Toprol 25 mg PO daily   Continue with Amio 200 mg PO daily   Continue with ASA 81 mg PO daily   Monitor off diuretics   Repeat head CT   Neuro consult   INR 1.5 --> Coumadin 5mg tonight.   Daily CBC, BMP and PT/INR

## 2020-04-22 NOTE — PROGRESS NOTE ADULT - PROBLEM SELECTOR PLAN 1
- Stable and non hypoxic  - Management per ID, Completed course of Plaquenil on last admission (4/5) - Neurology recommendations appreciated  - Repeat Head CT ordered  - Discontinue lisinopril to allow for higher MAPs  - PT/OT to assess safety with ambulation  - Left shoulder x-ray given pain after fall

## 2020-04-23 LAB
ALBUMIN SERPL ELPH-MCNC: 3.8 G/DL — SIGNIFICANT CHANGE UP (ref 3.3–5)
ALP SERPL-CCNC: 65 U/L — SIGNIFICANT CHANGE UP (ref 40–120)
ALT FLD-CCNC: 61 U/L — HIGH (ref 10–45)
ANION GAP SERPL CALC-SCNC: 12 MMOL/L — SIGNIFICANT CHANGE UP (ref 5–17)
APTT BLD: 30.1 SEC — SIGNIFICANT CHANGE UP (ref 27.5–36.3)
AST SERPL-CCNC: 40 U/L — SIGNIFICANT CHANGE UP (ref 10–40)
BILIRUB DIRECT SERPL-MCNC: <0.1 MG/DL — SIGNIFICANT CHANGE UP (ref 0–0.2)
BILIRUB INDIRECT FLD-MCNC: >0.2 MG/DL — SIGNIFICANT CHANGE UP (ref 0.2–1)
BILIRUB SERPL-MCNC: 0.3 MG/DL — SIGNIFICANT CHANGE UP (ref 0.2–1.2)
BUN SERPL-MCNC: 16 MG/DL — SIGNIFICANT CHANGE UP (ref 7–23)
CALCIUM SERPL-MCNC: 9.1 MG/DL — SIGNIFICANT CHANGE UP (ref 8.4–10.5)
CHLORIDE SERPL-SCNC: 99 MMOL/L — SIGNIFICANT CHANGE UP (ref 96–108)
CO2 SERPL-SCNC: 23 MMOL/L — SIGNIFICANT CHANGE UP (ref 22–31)
CREAT SERPL-MCNC: 0.97 MG/DL — SIGNIFICANT CHANGE UP (ref 0.5–1.3)
CRP SERPL-MCNC: 0.6 MG/DL — HIGH (ref 0–0.4)
D DIMER BLD IA.RAPID-MCNC: 2182 NG/ML DDU — HIGH
FERRITIN SERPL-MCNC: 141 NG/ML — SIGNIFICANT CHANGE UP (ref 30–400)
GLUCOSE SERPL-MCNC: 98 MG/DL — SIGNIFICANT CHANGE UP (ref 70–99)
HCT VFR BLD CALC: 32.6 % — LOW (ref 39–50)
HGB BLD-MCNC: 10.3 G/DL — LOW (ref 13–17)
INR BLD: 1.64 RATIO — HIGH (ref 0.88–1.16)
LDH SERPL L TO P-CCNC: 285 U/L — HIGH (ref 50–242)
MCHC RBC-ENTMCNC: 28.8 PG — SIGNIFICANT CHANGE UP (ref 27–34)
MCHC RBC-ENTMCNC: 31.6 GM/DL — LOW (ref 32–36)
MCV RBC AUTO: 91.1 FL — SIGNIFICANT CHANGE UP (ref 80–100)
NRBC # BLD: 0 /100 WBCS — SIGNIFICANT CHANGE UP (ref 0–0)
NT-PROBNP SERPL-SCNC: 182 PG/ML — SIGNIFICANT CHANGE UP (ref 0–300)
PLATELET # BLD AUTO: 138 K/UL — LOW (ref 150–400)
POTASSIUM SERPL-MCNC: 4.2 MMOL/L — SIGNIFICANT CHANGE UP (ref 3.5–5.3)
POTASSIUM SERPL-SCNC: 4.2 MMOL/L — SIGNIFICANT CHANGE UP (ref 3.5–5.3)
PROT SERPL-MCNC: 7.5 G/DL — SIGNIFICANT CHANGE UP (ref 6–8.3)
PROTHROM AB SERPL-ACNC: 19.1 SEC — HIGH (ref 10–12.9)
RBC # BLD: 3.58 M/UL — LOW (ref 4.2–5.8)
RBC # FLD: 15.3 % — HIGH (ref 10.3–14.5)
SODIUM SERPL-SCNC: 134 MMOL/L — LOW (ref 135–145)
WBC # BLD: 6.58 K/UL — SIGNIFICANT CHANGE UP (ref 3.8–10.5)
WBC # FLD AUTO: 6.58 K/UL — SIGNIFICANT CHANGE UP (ref 3.8–10.5)

## 2020-04-23 PROCEDURE — 93750 INTERROGATION VAD IN PERSON: CPT

## 2020-04-23 PROCEDURE — 99232 SBSQ HOSP IP/OBS MODERATE 35: CPT

## 2020-04-23 PROCEDURE — 99233 SBSQ HOSP IP/OBS HIGH 50: CPT | Mod: 25

## 2020-04-23 PROCEDURE — 99232 SBSQ HOSP IP/OBS MODERATE 35: CPT | Mod: GC

## 2020-04-23 RX ORDER — WARFARIN SODIUM 2.5 MG/1
5 TABLET ORAL ONCE
Refills: 0 | Status: COMPLETED | OUTPATIENT
Start: 2020-04-23 | End: 2020-04-23

## 2020-04-23 RX ADMIN — SODIUM CHLORIDE 3 MILLILITER(S): 9 INJECTION INTRAMUSCULAR; INTRAVENOUS; SUBCUTANEOUS at 13:27

## 2020-04-23 RX ADMIN — SODIUM CHLORIDE 3 MILLILITER(S): 9 INJECTION INTRAMUSCULAR; INTRAVENOUS; SUBCUTANEOUS at 22:19

## 2020-04-23 RX ADMIN — FINASTERIDE 5 MILLIGRAM(S): 5 TABLET, FILM COATED ORAL at 12:24

## 2020-04-23 RX ADMIN — FAMOTIDINE 20 MILLIGRAM(S): 10 INJECTION INTRAVENOUS at 12:24

## 2020-04-23 RX ADMIN — SODIUM CHLORIDE 3 MILLILITER(S): 9 INJECTION INTRAMUSCULAR; INTRAVENOUS; SUBCUTANEOUS at 05:58

## 2020-04-23 RX ADMIN — Medication 650 MILLIGRAM(S): at 06:00

## 2020-04-23 RX ADMIN — MIRTAZAPINE 7.5 MILLIGRAM(S): 45 TABLET, ORALLY DISINTEGRATING ORAL at 12:24

## 2020-04-23 RX ADMIN — AMIODARONE HYDROCHLORIDE 200 MILLIGRAM(S): 400 TABLET ORAL at 06:12

## 2020-04-23 RX ADMIN — Medication 25 MILLIGRAM(S): at 06:12

## 2020-04-23 RX ADMIN — Medication 81 MILLIGRAM(S): at 12:24

## 2020-04-23 RX ADMIN — WARFARIN SODIUM 5 MILLIGRAM(S): 2.5 TABLET ORAL at 22:26

## 2020-04-23 NOTE — PROGRESS NOTE ADULT - ASSESSMENT
63 YO M with a history of ACC/AHA stage D HF due to NICM s/p HM2 LVAD on 9/12/17 as destination therapy (severe PAD), and recent COVID-19 with benign course who was admitted 4/21 after an episode of syncope which occurred while seated and resulted in fall with laceration requiring stiches over left eyebrow. Of note during recent hospitalization he had transient episode of AMS/syncope which was self resolving. He is currently normotensive and asymptomatic with normal head CT. Etiology of syncope unclear but potentially vasovagal or related to low BP for which we are liberalizing MAPs. Neurology following, will require outpatient follow up. Of note patient noted to have fracture at distal aspect of left clavicle, ortho is following and will require outpatient follow up.  PT assessed patient today and safe to be discharge home with PT services. Plan to discharge home tomorrow. 65 YO M with a history of ACC/AHA stage D HF due to NICM s/p HM2 LVAD on 9/12/17 as destination therapy (severe PAD), and recent COVID-19 with benign course who was admitted 4/21 after an episode of syncope which occurred while seated and resulted in fall with laceration requiring stiches over left eyebrow. Of note during recent hospitalization he had transient episode of AMS/syncope which was self resolving. He is currently normotensive and asymptomatic with normal head CT. Etiology of syncope unclear but potentially vasovagal or related to low BP for which we are liberalizing MAPs and stopping offending agents. Neurology following, will require outpatient follow up. Of note patient noted to have fracture at distal aspect of left clavicle, ortho is following and will require outpatient follow up.  PT assessed patient today and safe to be discharge home with PT services. Plan to discharge home tomorrow.

## 2020-04-23 NOTE — PROGRESS NOTE ADULT - SUBJECTIVE AND OBJECTIVE BOX
VITAL SIGNS    Telemetry:    Vital Signs Last 24 Hrs  T(C): 36.9 (04-23-20 @ 06:07), Max: 36.9 (04-23-20 @ 06:07)  T(F): 98.5 (04-23-20 @ 06:07), Max: 98.5 (04-23-20 @ 06:07)  HR: 101 (04-23-20 @ 06:07) (95 - 102)  BP: 92/67 (04-23-20 @ 06:07) (92/67 - 97/71)  RR: 18 (04-23-20 @ 06:07) (18 - 18)  SpO2: 96% (04-23-20 @ 06:07) (96% - 98%)                       10.3<L>                134<L>| 23   | 16           6.58  >-----------< 138<L>   ------------------------< 98                    32.6<L>                4.2  | 99   | 0.97                                                                      Ca 9.1   Mg x     Ph x        ,             10.2<L>                136  | 25   | 12           5.09  >-----------< 144<L>   ------------------------< 110<H>                 31.2<L>                4.5  | 101  | 1.21                                                                      Ca 8.8   Mg x     Ph x                04-22-20 @ 07:01  -  04-23-20 @ 07:00  --------------------------------------------------------  IN: 540 mL / OUT: 1300 mL / NET: -760 mL    04-23-20 @ 07:01  -  04-23-20 @ 09:33  --------------------------------------------------------  IN: 0 mL / OUT: 200 mL / NET: -200 mL    04-23 @ 06:46  PT19.1 INR1.64  PTT30.1  04-22 @ 15:33  PT18.2 INR1.56  PTT30.3  04-21 @ 22:17  PT19.7 INR1.70  PTT--  04-20 @ 08:35  PT26.6 INR2.29  PTT41.0  04-19 @ 05:57  PT20.2 INR1.73  PTT--      Daily Height in cm: 154.94 (22 Apr 2020 10:01)    Daily       CAPILLARY BLOOD GLUCOSE                    Coumadin    [ ] YES          [  ]      NO                                   PHYSICAL EXAM        Neurology: alert and oriented x 3, nonfocal, no gross deficits  CV : .S1S2 RRR  Sternal Wound :  CDI , Stable  Pacing Wires        [  ]   Settings:                                  Isolated  [  ]  Lungs: bibasilar crackles   Drains:     MS         [  ] Drainage:                 L Pleural  [  ]  Drainage:                R Pleural  [  ]  Drainage:  Abdomen: soft, nontender, nondistended, positive bowel sounds, last bowel movement   :         voiding    Extremities:     __ edema, ___calf tenderness.                            __svg site cdi          acetaminophen   Tablet .. 650 milliGRAM(s) Oral every 6 hours PRN  aMIOdarone    Tablet 200 milliGRAM(s) Oral daily  aspirin enteric coated 81 milliGRAM(s) Oral daily  famotidine    Tablet 20 milliGRAM(s) Oral daily  finasteride 5 milliGRAM(s) Oral daily  guaiFENesin   Syrup  (Sugar-Free) 100 milliGRAM(s) Oral every 6 hours PRN  metoprolol succinate ER 25 milliGRAM(s) Oral daily  mirtazapine 7.5 milliGRAM(s) Oral daily  polyethylene glycol 3350 17 Gram(s) Oral daily  senna 2 Tablet(s) Oral at bedtime  sodium chloride 0.9% lock flush 3 milliLiter(s) IV Push every 8 hours  tamsulosin 0.4 milliGRAM(s) Oral at bedtime  warfarin 5 milliGRAM(s) Oral once                    Physical Therapy Rec:   Home  [  ]   Home w/ PT  [  ]  Rehab  [  ]  Discussed with Cardiothoracic Team at AM rounds. VITAL SIGNS    Telemetry:    Vital Signs Last 24 Hrs  T(C): 36.9 (04-23-20 @ 06:07), Max: 36.9 (04-23-20 @ 06:07)  T(F): 98.5 (04-23-20 @ 06:07), Max: 98.5 (04-23-20 @ 06:07)  HR: 101 (04-23-20 @ 06:07) (95 - 102)  BP: 92/67 (04-23-20 @ 06:07) (92/67 - 97/71)  RR: 18 (04-23-20 @ 06:07) (18 - 18)  SpO2: 96% (04-23-20 @ 06:07) (96% - 98%)                       10.3<L>                134<L>| 23   | 16           6.58  >-----------< 138<L>   ------------------------< 98                    32.6<L>                4.2  | 99   | 0.97                                                                      Ca 9.1   Mg x     Ph x        ,             10.2<L>                136  | 25   | 12           5.09  >-----------< 144<L>   ------------------------< 110<H>                 31.2<L>                4.5  | 101  | 1.21                                                                      Ca 8.8   Mg x     Ph x                04-22-20 @ 07:01  -  04-23-20 @ 07:00  --------------------------------------------------------  IN: 540 mL / OUT: 1300 mL / NET: -760 mL    04-23-20 @ 07:01  -  04-23-20 @ 09:33  --------------------------------------------------------  IN: 0 mL / OUT: 200 mL / NET: -200 mL    04-23 @ 06:46  PT19.1 INR1.64  PTT30.1  04-22 @ 15:33  PT18.2 INR1.56  PTT30.3  04-21 @ 22:17  PT19.7 INR1.70  PTT--  04-20 @ 08:35  PT26.6 INR2.29  PTT41.0  04-19 @ 05:57  PT20.2 INR1.73  PTT--      Daily Height in cm: 154.94 (22 Apr 2020 10:01)    Daily       CAPILLARY BLOOD GLUCOSE                    Coumadin    [ ] YES          [  ]      NO                                   PHYSICAL EXAM                  acetaminophen   Tablet .. 650 milliGRAM(s) Oral every 6 hours PRN  aMIOdarone    Tablet 200 milliGRAM(s) Oral daily  aspirin enteric coated 81 milliGRAM(s) Oral daily  famotidine    Tablet 20 milliGRAM(s) Oral daily  finasteride 5 milliGRAM(s) Oral daily  guaiFENesin   Syrup  (Sugar-Free) 100 milliGRAM(s) Oral every 6 hours PRN  metoprolol succinate ER 25 milliGRAM(s) Oral daily  mirtazapine 7.5 milliGRAM(s) Oral daily  polyethylene glycol 3350 17 Gram(s) Oral daily  senna 2 Tablet(s) Oral at bedtime  sodium chloride 0.9% lock flush 3 milliLiter(s) IV Push every 8 hours  tamsulosin 0.4 milliGRAM(s) Oral at bedtime  warfarin 5 milliGRAM(s) Oral once                    Physical Therapy Rec:   Home  [  ]   Home w/ PT  [  ]  Rehab  [  ]  Discussed with Cardiothoracic Team at AM rounds.

## 2020-04-23 NOTE — PHYSICAL THERAPY INITIAL EVALUATION ADULT - PRECAUTIONS/LIMITATIONS, REHAB EVAL
Pt dc'd 4/20 &  s/p fall same day. Was seen over at [facility] 4/20/20 post fall & received 4 stitches on left eyebrow & head CT. Head CT= left frontal/ left periorbital edema w/out any intracranial hemorrhage. States he occasionally gets headaches & unable to open left eye due to swelling. cardiac precautions/Pt dc'd 4/20 &  s/p fall same day. Was seen over at [facility] 4/20/20 post fall & received 4 stitches on left eyebrow & head CT. Head CT= left frontal/ left periorbital edema w/out any intracranial hemorrhage. States he occasionally gets headaches & unable to open left eye due to swelling.

## 2020-04-23 NOTE — PROGRESS NOTE ADULT - PROBLEM SELECTOR PLAN 1
- Neurology following, appreciate recommendations. Will require outpatient follow up  - Repeat Head CT 4/22 with left supraorbital/left frontal hematoma   - Continue to hold lisinopril to allow for higher MAPs  - PT/OT following- ok for patient to be discharged home  - Left shoulder x-ray shows fracture at distal aspect of left clavicle  - Ortho consulted, appreciate recommendations. Will require outpatient followup - Neurology following, appreciate recommendations. Will require outpatient follow up  - Repeat Head CT 4/22 with left supraorbital/left frontal hematoma   - Continue to hold lisinopril to allow for higher MAPs  - Discontinue tamsulosin today as it can contribute to hypotension   - PT/OT following- ok for patient to be discharged home  - Left shoulder x-ray shows fracture at distal aspect of left clavicle  - Ortho consulted, appreciate recommendations. Will require outpatient followup - Check orthostatics   - Neurology following, no organic cause of syncope  - Repeat Head CT 4/22 with left supraorbital/left frontal hematoma   - Continue to hold lisinopril to allow for higher MAPs  - Discontinue tamsulosin  - Discontinue tamsulosin today as it can contribute to hypotension   - PT/OT following- ok for patient to be discharged home  - Left shoulder x-ray shows fracture at distal aspect of left clavicle  - Ortho consulted, appreciate recommendations. Will require outpatient followup

## 2020-04-23 NOTE — PROGRESS NOTE ADULT - ASSESSMENT
65 YO M with a history of ACC/AHA stage D HF due to NICM s/p HM2 LVAD on 9/12/17 as destination therapy (severe PAD) who was admitted with 4/2 with weakness, chills, and abdominal pain and found to have COVID-19 infection. He has never required supplemental O2 and completed a 5 day course of plaquinel and has downtrending inflammatory markers. Course notable for VT 4/9 for which he was started on amiodarone and RASHAWN due to hypovolemia which has resolved. Course also notable for transient episode of AMS 4/14 with CT head normal and now resolved. Pt discharged yesterday with INR 2.3  Pt s/p fall yesterday. Was seen over at [facility] 4/20/20 post fall and received 4 stitches on left eyebrow and head CT. Head CT indicative of left frontal/ left periorbital edema without any intracranial hemorrhage.  afebrile, tachycardic   normal WBC  still covid19+  no obvious signs of infection on exam  awaiting repeat CT head     Recommendations:  monitor off antibiotics   trend WBC  neurologic workup as per neurology   f/u CT head is negative for a new intracranial bleed  fractured clavicle now in a sling    Morris Prater MD  [phone]  After 5pm/weekends [phone]

## 2020-04-23 NOTE — PROGRESS NOTE ADULT - ASSESSMENT
63 YO M with a history of ACC/AHA stage D HF due to NICM s/p HM2 LVAD on 9/12/17 as destination therapy (severe PAD) who was admitted with 4/2 with weakness, chills, and abdominal pain and found to have COVID-19 infection. He has never required supplemental O2 and completed a 5 day course of Plaquenil and has downtrending inflammatory markers. Course notable for VT 4/9 for which he was started on amiodarone and RASHAWN due to hypovolemia which has resolved. Course also notable for transient episode of AMS 4/14 with CT head normal and now resolved. Pt discharged yesterday with INR 2.3  Pt s/p fall yesterday. Was seen over at [facility] 4/20/20 post fall and received 4 stitches on left eyebrow and head CT. Head CT indicative of left frontal/ left periorbital edema without any intracranial hemorrhage. Pt at this time denies any complaints. States he occasionally gets headaches and unable to open left eye due to swelling.  Hospital Course:   4/22 AMS --> Repeat head CT non con.  Neurology consult called.  c/o Left shoulder pain --> 2 view Shoulder X-rays ordered.  INR sub-therapeutic 1.5; Coumadin 5 mg PO tonight.  Lisinopril D/C'd per CHF for increase perfusion.  Persistently Covid positive -->ID following.  continue with current medication regimen.    4/23 L shoulder xray:< from: Xray Shoulder 2 Views, Left (04.22.20 @ 15:35) >  There is a fracture at the distal aspect of the left clavicle with cephalad displacement of distal fragment. No acute displaced fracture is seen in the proximal left humerus.    < end of copied text >  Ortho consulted , sling, no surgical intervention.    < from: CT Head No Cont (04.22.20 @ 18:27) >  No acute hemorrhage, mass effect or extra-axial collections.  New left supraorbital/left frontal extracalvarial soft tissue hematoma.    < end of copied text >    Neuro: CT head w/o contrast reviewed; patient with soft tissue hematoma, no sign of bleeding/hemorrhage/mass effect intraparenchymally to my eye. AMS, syncope likely 2/2 toxic metabolic dysfunction 2/2 multiple toxic/metabolic risk factors for dysfunction.  Recs:  No acute inpatient neurologic intervention at this time  Follow up outpatient with Dr. Michael Nissenbaum([phone]) for further workup and management    Off lisinopril, maps 73-16

## 2020-04-23 NOTE — PROGRESS NOTE ADULT - SUBJECTIVE AND OBJECTIVE BOX
INFECTIOUS DISEASES FOLLOW UP-- Anitra Prater  [phone]    This is a follow up note for this  64yMale with  COVID-19  readmitted with a possible syncopal episode and subsequent facial laceration and broken left clavicle      ROS:  CONSTITUTIONAL:  No fever, good appetite  CARDIOVASCULAR:  No chest pain or palpitations  RESPIRATORY:  No dyspnea  GASTROINTESTINAL:  No nausea, vomiting, diarrhea, or abdominal pain  GENITOURINARY:  No dysuria  NEUROLOGIC:  No headache, but pain by the bruising    Allergies    No Known Allergies    Intolerances        ANTIBIOTICS/RELEVANT:  antimicrobials    immunologic:    OTHER:  acetaminophen   Tablet .. 650 milliGRAM(s) Oral every 6 hours PRN  aMIOdarone    Tablet 200 milliGRAM(s) Oral daily  aspirin enteric coated 81 milliGRAM(s) Oral daily  famotidine    Tablet 20 milliGRAM(s) Oral daily  finasteride 5 milliGRAM(s) Oral daily  guaiFENesin   Syrup  (Sugar-Free) 100 milliGRAM(s) Oral every 6 hours PRN  metoprolol succinate ER 25 milliGRAM(s) Oral daily  mirtazapine 7.5 milliGRAM(s) Oral daily  polyethylene glycol 3350 17 Gram(s) Oral daily  senna 2 Tablet(s) Oral at bedtime  sodium chloride 0.9% lock flush 3 milliLiter(s) IV Push every 8 hours  warfarin 5 milliGRAM(s) Oral once      Objective:  Vital Signs Last 24 Hrs  T(C): 36.9 (2020 14:22), Max: 37 (2020 10:25)  T(F): 98.5 (2020 14:22), Max: 98.6 (2020 10:25)  HR: 96 (2020 14:22) (95 - 101)  BP: 97/71 (2020 10:50) (91/66 - 97/71)  BP(mean): 76 (2020 14:22) (73 - 78)  RR: 18 (2020 14:22) (17 - 18)  SpO2: 98% (2020 14:22) (96% - 98%)    PHYSICAL EXAM:  Constitutional:no acute distress  Eyes:ALONSO, EOMI, conjunctival bleeding  Ear/Nose/Throat: no oral lesions, 	  Respiratory: clear BL  Cardiovascular: LVAD sounds  Gastrointestinal:soft, (+) BS, no tenderness exit site CDI  Extremities:no e/e/c sling for left arm  No Lymphadenopathy  IV sites not inflammed.    LABS:                        10.3   6.58  )-----------( 138      ( 2020 06:46 )             32.6         134<L>  |  99  |  16  ----------------------------<  98  4.2   |  23  |  0.97    Ca    9.1      2020 06:46    TPro  7.5  /  Alb  3.8  /  TBili  0.3  /  DBili  <0.1  /  AST  40  /  ALT  61<H>  /  AlkPhos  65      PT/INR - ( 2020 06:46 )   PT: 19.1 sec;   INR: 1.64 ratio         PTT - ( 2020 06:46 )  PTT:30.1 sec  Urinalysis Basic - ( 2020 11:09 )    Color: Light Yellow / Appearance: Clear / S.017 / pH: x  Gluc: x / Ketone: Negative  / Bili: Negative / Urobili: Negative   Blood: x / Protein: Negative / Nitrite: Negative   Leuk Esterase: Negative / RBC: x / WBC x   Sq Epi: x / Non Sq Epi: x / Bacteria: x        MICROBIOLOGY:  MRSA PCR Result.: NotDetec: MRSA/MSSA PCR assay is a qualitative in vitro diagnostic test for the  direct detection and differentiation of methicillin-resistant  Staphylococcus aureus (MRSA) from Staphylococcus aureus (SA). The assay  detects DNA from nasal swabs in patients atrisk for nasal colonization.  It is not intended to diagnose MRSA or SA infections nor guide or monitor  treatment for MRSA/SA infections. A negative result does not preclude  nasal colonization. The assay is FDA-approved and its performance has  been established by [employer], [place] and the [employer], [place], [place]. (20 @ 23:11)              RECENT CULTURES:  COVID-19 PCR: Detected: This test has been validated by [facility] to be accurate;  though it has not been FDA cleared/approved by the usual pathway.  As with all laboratory tests, results should be correlated with clinical  findings.  https://www.fda.gov/media/517618/download  https://www.fda.gov/media/785085/download (20 @ 21:23)        RADIOLOGY & ADDITIONAL STUDIES:      < from: CT Head No Cont (20 @ 18:27) >  Impression:    No acute hemorrhage, mass effect or extra-axial collections.  New left supraorbital/left frontal extracalvarial soft tissue hematoma.    < end of copied text >  < from: Xray Shoulder 2 Views, Left (20 @ 15:35) >  IMPRESSION:   There is a fracture at the distal aspect of the left clavicle with cephalad displacement of distal fragment. No acute displaced fracture is seen in the proximal left humerus.    < end of copied text >

## 2020-04-23 NOTE — PHYSICAL THERAPY INITIAL EVALUATION ADULT - ADDITIONAL COMMENTS
Pt lives in a pvt home w/ his sister, 5 steps to enter none inside. PTA pt reports being independent w/ all functional mobility & did not utilize an AD for ambulation. Given Straight cane at last admission.

## 2020-04-23 NOTE — PHYSICAL THERAPY INITIAL EVALUATION ADULT - PERTINENT HX OF CURRENT PROBLEM, REHAB EVAL
64 y.o M PMH ACC/AHA stage D HF due to NICM s/p HM2 LVAD on 9/12/17 as destination therapy (severe PAD). Recent admit 4/2 w/ weakness, chills, abdominal pain & found to have COVID-19 infection. Never required supplemental O2 & completed course of plaquinel. Course notable for VT 4/9 & RASHAWN due to hypovolemia which has resolved. Course also notable for transient episode of AMS 4/14 (now resolved).

## 2020-04-23 NOTE — PROGRESS NOTE ADULT - PROBLEM SELECTOR PLAN 2
- Continue Toprol XL 25mg PO daily (hold for MAPs <70)  - Continue to hold Lisinopril   - Appears euvolemic off diuretics

## 2020-04-23 NOTE — PROGRESS NOTE ADULT - SUBJECTIVE AND OBJECTIVE BOX
Subjective: Patient seen and examined resting in bed. ON no issues. Today states to experience some discomfort at his L shoulder, no other complaints at this time,     Medications:  acetaminophen   Tablet .. 650 milliGRAM(s) Oral every 6 hours PRN  aMIOdarone    Tablet 200 milliGRAM(s) Oral daily  aspirin enteric coated 81 milliGRAM(s) Oral daily  famotidine    Tablet 20 milliGRAM(s) Oral daily  finasteride 5 milliGRAM(s) Oral daily  guaiFENesin   Syrup  (Sugar-Free) 100 milliGRAM(s) Oral every 6 hours PRN  metoprolol succinate ER 25 milliGRAM(s) Oral daily  mirtazapine 7.5 milliGRAM(s) Oral daily  polyethylene glycol 3350 17 Gram(s) Oral daily  senna 2 Tablet(s) Oral at bedtime  sodium chloride 0.9% lock flush 3 milliLiter(s) IV Push every 8 hours  warfarin 5 milliGRAM(s) Oral once      Vitals:  Vital Signs Last 24 Hours  T(C): 37 (04-23-20 @ 10:25), Max: 37 (04-23-20 @ 10:25)  HR: 98 (04-23-20 @ 10:50) (95 - 101)  BP: 97/71 (04-23-20 @ 10:50) (91/66 - 97/71)  RR: 17 (04-23-20 @ 10:50) (17 - 18)  SpO2: 98% (04-23-20 @ 10:50) (96% - 98%)    Tele:      I&O's Summary    22 Apr 2020 07:01 - 23 Apr 2020 07:00  --------------------------------------------------------  IN: 540 mL / OUT: 1300 mL / NET: -760 mL    23 Apr 2020 07:01  -  23 Apr 2020 12:11  --------------------------------------------------------  IN: 0 mL / OUT: 200 mL / NET: -200 mL      Physical Exam  General: No distress. Comfortable.  HEENT: EOM intact grossly of rt eye. Left eye with periorbital edema, unable to open eye.  Neck: Neck supple. JVP not elevated. No masses  Chest: Clear to auscultation bilaterally  CV: +VAD hum  Abdomen: Soft, non-distended, non-tender  Skin: No rashes or skin breakdown, driveline dressing c/d/i  Neurology: Alert and oriented times three. Sensation intact  Psych: Affect normal  Extremities: L arm noted to be in sling    LVAD Interrogation  VAD TYPE HM 2   Speed 8800  Flow 3.5  Power 4.3  PI 4.7  Assessment of driveline exit site: dressing c/d/i  Programming changes: no changes made     Labs:                        10.3   6.58  )-----------( 138      ( 23 Apr 2020 06:46 )             32.6     04-23    134<L>  |  99  |  16  ----------------------------<  98  4.2   |  23  |  0.97    Ca    9.1      23 Apr 2020 06:46    TPro  7.5  /  Alb  3.8  /  TBili  0.3  /  DBili  <0.1  /  AST  40  /  ALT  61<H>  /  AlkPhos  65  04-23    PT/INR - ( 23 Apr 2020 06:46 )   PT: 19.1 sec;   INR: 1.64 ratio         PTT - ( 23 Apr 2020 06:46 )  PTT:30.1 sec    Serum Pro-Brain Natriuretic Peptide: 182 pg/mL (04-23 @ 06:46)  Serum Pro-Brain Natriuretic Peptide: 348 pg/mL (04-21 @ 22:17)    Lactate Dehydrogenase, Serum: 285 U/L (04-23 @ 06:46)      Imaging Studies     < from: CT Head No Cont (04.22.20 @ 18:27) >  Impression:  No acute hemorrhage, mass effect or extra-axial collections.  New left supraorbital/left frontal extracalvarial soft tissue hematoma.  < end of copied text >      < from: Xray Shoulder 2 Views, Left (04.22.20 @ 15:35) >  IMPRESSION:   There is a fracture at the distal aspect of the left clavicle with cephalad displacement of distal fragment. No acute displaced fracture is seen in the proximal left humerus.  < end of copied text > Subjective: Patient seen and examined resting in bed. ON no issues. Today states to experience some discomfort at his L shoulder, no other complaints at this time,     Medications:  acetaminophen   Tablet .. 650 milliGRAM(s) Oral every 6 hours PRN  aMIOdarone    Tablet 200 milliGRAM(s) Oral daily  aspirin enteric coated 81 milliGRAM(s) Oral daily  famotidine    Tablet 20 milliGRAM(s) Oral daily  finasteride 5 milliGRAM(s) Oral daily  guaiFENesin   Syrup  (Sugar-Free) 100 milliGRAM(s) Oral every 6 hours PRN  metoprolol succinate ER 25 milliGRAM(s) Oral daily  mirtazapine 7.5 milliGRAM(s) Oral daily  polyethylene glycol 3350 17 Gram(s) Oral daily  senna 2 Tablet(s) Oral at bedtime  sodium chloride 0.9% lock flush 3 milliLiter(s) IV Push every 8 hours  warfarin 5 milliGRAM(s) Oral once      Vitals:  Vital Signs Last 24 Hours  T(C): 37 (04-23-20 @ 10:25), Max: 37 (04-23-20 @ 10:25)  HR: 98 (04-23-20 @ 10:50) (95 - 101)  BP: 97/71 (04-23-20 @ 10:50) (91/66 - 97/71)  RR: 17 (04-23-20 @ 10:50) (17 - 18)  SpO2: 98% (04-23-20 @ 10:50) (96% - 98%)    Tele:      I&O's Summary    22 Apr 2020 07:01 - 23 Apr 2020 07:00  --------------------------------------------------------  IN: 540 mL / OUT: 1300 mL / NET: -760 mL    23 Apr 2020 07:01  -  23 Apr 2020 12:11  --------------------------------------------------------  IN: 0 mL / OUT: 200 mL / NET: -200 mL      Physical Exam  General: No distress. Comfortable.  HEENT: EOM intact grossly of rt eye. Left eye with periorbital edema, unable to open eye.  Neck: Neck supple. JVP not elevated. No masses  Chest: Clear to auscultation bilaterally  CV: +VAD hum  Abdomen: Soft, non-distended, non-tender  Skin: No rashes or skin breakdown, driveline dressing c/d/i  Neurology: Alert and oriented times three. Sensation intact  Psych: Affect normal  Extremities: L arm noted to be in sling    LVAD Interrogation  VAD TYPE HM 2   Speed 8800  Flow 3.5  Power 4.3  PI 4.7  Events: occasional PI events without speed drops   Assessment of driveline exit site: dressing c/d/i  Programming changes: no changes made     Labs:                        10.3   6.58  )-----------( 138      ( 23 Apr 2020 06:46 )             32.6     04-23    134<L>  |  99  |  16  ----------------------------<  98  4.2   |  23  |  0.97    Ca    9.1      23 Apr 2020 06:46    TPro  7.5  /  Alb  3.8  /  TBili  0.3  /  DBili  <0.1  /  AST  40  /  ALT  61<H>  /  AlkPhos  65  04-23    PT/INR - ( 23 Apr 2020 06:46 )   PT: 19.1 sec;   INR: 1.64 ratio         PTT - ( 23 Apr 2020 06:46 )  PTT:30.1 sec    Serum Pro-Brain Natriuretic Peptide: 182 pg/mL (04-23 @ 06:46)  Serum Pro-Brain Natriuretic Peptide: 348 pg/mL (04-21 @ 22:17)    Lactate Dehydrogenase, Serum: 285 U/L (04-23 @ 06:46)      Imaging Studies     < from: CT Head No Cont (04.22.20 @ 18:27) >  Impression:  No acute hemorrhage, mass effect or extra-axial collections.  New left supraorbital/left frontal extracalvarial soft tissue hematoma.  < end of copied text >      < from: Xray Shoulder 2 Views, Left (04.22.20 @ 15:35) >  IMPRESSION:   There is a fracture at the distal aspect of the left clavicle with cephalad displacement of distal fragment. No acute displaced fracture is seen in the proximal left humerus.  < end of copied text >

## 2020-04-24 ENCOUNTER — TRANSCRIPTION ENCOUNTER (OUTPATIENT)
Age: 65
End: 2020-04-24

## 2020-04-24 VITALS
OXYGEN SATURATION: 97 % | HEART RATE: 102 BPM | TEMPERATURE: 98 F | SYSTOLIC BLOOD PRESSURE: 106 MMHG | RESPIRATION RATE: 18 BRPM | DIASTOLIC BLOOD PRESSURE: 74 MMHG

## 2020-04-24 LAB
ALBUMIN SERPL ELPH-MCNC: 3.6 G/DL — SIGNIFICANT CHANGE UP (ref 3.3–5)
ALP SERPL-CCNC: 64 U/L — SIGNIFICANT CHANGE UP (ref 40–120)
ALT FLD-CCNC: 56 U/L — HIGH (ref 10–45)
ANION GAP SERPL CALC-SCNC: 14 MMOL/L — SIGNIFICANT CHANGE UP (ref 5–17)
APTT BLD: 31.2 SEC — SIGNIFICANT CHANGE UP (ref 27.5–36.3)
AST SERPL-CCNC: 34 U/L — SIGNIFICANT CHANGE UP (ref 10–40)
BILIRUB SERPL-MCNC: 0.2 MG/DL — SIGNIFICANT CHANGE UP (ref 0.2–1.2)
BUN SERPL-MCNC: 15 MG/DL — SIGNIFICANT CHANGE UP (ref 7–23)
CALCIUM SERPL-MCNC: 9.1 MG/DL — SIGNIFICANT CHANGE UP (ref 8.4–10.5)
CHLORIDE SERPL-SCNC: 101 MMOL/L — SIGNIFICANT CHANGE UP (ref 96–108)
CO2 SERPL-SCNC: 23 MMOL/L — SIGNIFICANT CHANGE UP (ref 22–31)
CREAT SERPL-MCNC: 0.99 MG/DL — SIGNIFICANT CHANGE UP (ref 0.5–1.3)
CRP SERPL-MCNC: 1.26 MG/DL — HIGH (ref 0–0.4)
D DIMER BLD IA.RAPID-MCNC: 2418 NG/ML DDU — HIGH
FERRITIN SERPL-MCNC: 131 NG/ML — SIGNIFICANT CHANGE UP (ref 30–400)
GLUCOSE SERPL-MCNC: 100 MG/DL — HIGH (ref 70–99)
HCT VFR BLD CALC: 30.8 % — LOW (ref 39–50)
HGB BLD-MCNC: 10.1 G/DL — LOW (ref 13–17)
INR BLD: 2.04 RATIO — HIGH (ref 0.88–1.16)
LDH SERPL L TO P-CCNC: 264 U/L — HIGH (ref 50–242)
MCHC RBC-ENTMCNC: 29.4 PG — SIGNIFICANT CHANGE UP (ref 27–34)
MCHC RBC-ENTMCNC: 32.8 GM/DL — SIGNIFICANT CHANGE UP (ref 32–36)
MCV RBC AUTO: 89.5 FL — SIGNIFICANT CHANGE UP (ref 80–100)
NRBC # BLD: 0 /100 WBCS — SIGNIFICANT CHANGE UP (ref 0–0)
PLATELET # BLD AUTO: 141 K/UL — LOW (ref 150–400)
POTASSIUM SERPL-MCNC: 4.2 MMOL/L — SIGNIFICANT CHANGE UP (ref 3.5–5.3)
POTASSIUM SERPL-SCNC: 4.2 MMOL/L — SIGNIFICANT CHANGE UP (ref 3.5–5.3)
PROT SERPL-MCNC: 7.2 G/DL — SIGNIFICANT CHANGE UP (ref 6–8.3)
PROTHROM AB SERPL-ACNC: 23.8 SEC — HIGH (ref 10–12.9)
RBC # BLD: 3.44 M/UL — LOW (ref 4.2–5.8)
RBC # FLD: 15.5 % — HIGH (ref 10.3–14.5)
SODIUM SERPL-SCNC: 138 MMOL/L — SIGNIFICANT CHANGE UP (ref 135–145)
WBC # BLD: 4.95 K/UL — SIGNIFICANT CHANGE UP (ref 3.8–10.5)
WBC # FLD AUTO: 4.95 K/UL — SIGNIFICANT CHANGE UP (ref 3.8–10.5)

## 2020-04-24 PROCEDURE — 93750 INTERROGATION VAD IN PERSON: CPT

## 2020-04-24 PROCEDURE — 70450 CT HEAD/BRAIN W/O DYE: CPT

## 2020-04-24 PROCEDURE — 82248 BILIRUBIN DIRECT: CPT

## 2020-04-24 PROCEDURE — 83615 LACTATE (LD) (LDH) ENZYME: CPT

## 2020-04-24 PROCEDURE — 97162 PT EVAL MOD COMPLEX 30 MIN: CPT

## 2020-04-24 PROCEDURE — 71045 X-RAY EXAM CHEST 1 VIEW: CPT

## 2020-04-24 PROCEDURE — 82728 ASSAY OF FERRITIN: CPT

## 2020-04-24 PROCEDURE — 83880 ASSAY OF NATRIURETIC PEPTIDE: CPT

## 2020-04-24 PROCEDURE — 99233 SBSQ HOSP IP/OBS HIGH 50: CPT | Mod: 25

## 2020-04-24 PROCEDURE — 85730 THROMBOPLASTIN TIME PARTIAL: CPT

## 2020-04-24 PROCEDURE — 80053 COMPREHEN METABOLIC PANEL: CPT

## 2020-04-24 PROCEDURE — 85379 FIBRIN DEGRADATION QUANT: CPT

## 2020-04-24 PROCEDURE — 87640 STAPH A DNA AMP PROBE: CPT

## 2020-04-24 PROCEDURE — 81003 URINALYSIS AUTO W/O SCOPE: CPT

## 2020-04-24 PROCEDURE — 87635 SARS-COV-2 COVID-19 AMP PRB: CPT

## 2020-04-24 PROCEDURE — 86803 HEPATITIS C AB TEST: CPT

## 2020-04-24 PROCEDURE — 84443 ASSAY THYROID STIM HORMONE: CPT

## 2020-04-24 PROCEDURE — 85610 PROTHROMBIN TIME: CPT

## 2020-04-24 PROCEDURE — 83036 HEMOGLOBIN GLYCOSYLATED A1C: CPT

## 2020-04-24 PROCEDURE — 87641 MR-STAPH DNA AMP PROBE: CPT

## 2020-04-24 PROCEDURE — 85027 COMPLETE CBC AUTOMATED: CPT

## 2020-04-24 PROCEDURE — 73030 X-RAY EXAM OF SHOULDER: CPT

## 2020-04-24 PROCEDURE — 86140 C-REACTIVE PROTEIN: CPT

## 2020-04-24 RX ORDER — ACETAMINOPHEN 500 MG
650 TABLET ORAL EVERY 6 HOURS
Refills: 0 | Status: DISCONTINUED | OUTPATIENT
Start: 2020-04-24 | End: 2020-04-24

## 2020-04-24 RX ORDER — WARFARIN SODIUM 2.5 MG/1
1 TABLET ORAL
Qty: 30 | Refills: 0
Start: 2020-04-24 | End: 2020-05-23

## 2020-04-24 RX ORDER — ACETAMINOPHEN 500 MG
2 TABLET ORAL
Qty: 56 | Refills: 0
Start: 2020-04-24 | End: 2020-04-30

## 2020-04-24 RX ADMIN — SODIUM CHLORIDE 3 MILLILITER(S): 9 INJECTION INTRAMUSCULAR; INTRAVENOUS; SUBCUTANEOUS at 05:53

## 2020-04-24 RX ADMIN — AMIODARONE HYDROCHLORIDE 200 MILLIGRAM(S): 400 TABLET ORAL at 05:54

## 2020-04-24 RX ADMIN — Medication 25 MILLIGRAM(S): at 05:54

## 2020-04-24 RX ADMIN — Medication 81 MILLIGRAM(S): at 13:11

## 2020-04-24 RX ADMIN — FAMOTIDINE 20 MILLIGRAM(S): 10 INJECTION INTRAVENOUS at 13:15

## 2020-04-24 RX ADMIN — FINASTERIDE 5 MILLIGRAM(S): 5 TABLET, FILM COATED ORAL at 13:14

## 2020-04-24 RX ADMIN — SODIUM CHLORIDE 3 MILLILITER(S): 9 INJECTION INTRAMUSCULAR; INTRAVENOUS; SUBCUTANEOUS at 13:09

## 2020-04-24 RX ADMIN — MIRTAZAPINE 7.5 MILLIGRAM(S): 45 TABLET, ORALLY DISINTEGRATING ORAL at 13:14

## 2020-04-24 RX ADMIN — Medication 650 MILLIGRAM(S): at 13:11

## 2020-04-24 NOTE — DISCHARGE NOTE PROVIDER - HOSPITAL COURSE
65 YO M with a history of ACC/AHA stage D HF due to NICM s/p HM2 LVAD on 9/12/17 as destination therapy (severe PAD), and recent COVID-19 with benign course who was admitted 4/21 after an episode of syncope which occurred while seated and resulted in fall with laceration requiring stiches over left eyebrow. Of note during recent hospitalization he had transient episode of AMS/syncope which was self resolving. He is currently normotensive and asymptomatic with normal head CT. Etiology of syncope unclear but potentially vasovagal or related to low BP for which we are liberalizing MAPs and stopping offending agents. Neurology following, will require outpatient follow up. Of note patient noted to have fracture at distal aspect of left clavicle, ortho is following and will require outpatient follow up.  PT assessed patient and patient is safe to be discharge home with PT services.

## 2020-04-24 NOTE — DISCHARGE NOTE NURSING/CASE MANAGEMENT/SOCIAL WORK - PATIENT PORTAL LINK FT
You can access the FollowMyHealth Patient Portal offered by [facility] by registering at the following website: [email or web address]. By joining [facility]’s FollowMyHealth portal, you will also be able to view your health information using other applications (apps) compatible with our system.

## 2020-04-24 NOTE — DISCHARGE NOTE PROVIDER - PROVIDER TOKENS
PROVIDER:[TOKEN:[[id number]],FOLLOWUP:[1 week]],PROVIDER:[TOKEN:[[id number]],FOLLOWUP:[2 weeks]],PROVIDER:[TOKEN:[[id number]],FOLLOWUP:[1 week]] PROVIDER:[TOKEN:[[id number]],FOLLOWUP:[1 week]],PROVIDER:[TOKEN:[[id number]],FOLLOWUP:[2 weeks]],PROVIDER:[TOKEN:[[id number]],FOLLOWUP:[2 weeks]]

## 2020-04-24 NOTE — PROGRESS NOTE ADULT - PROBLEM SELECTOR PLAN 3
- Coumadin 5mg (goal INR 2-2.5) INR today 2.02. Next INR check will be on Monday 4/27  - Continue ASA  - Monitor MAPs, goal 70-80s

## 2020-04-24 NOTE — DISCHARGE NOTE PROVIDER - NSDCMRMEDTOKEN_GEN_ALL_CORE_FT
acetaminophen 325 mg oral tablet: 2 tab(s) orally every 6 hours, As needed, Temp greater or equal to 38C (100.4F), Moderate Pain (4 - 6)  amiodarone 200 mg oral tablet: 1 tab(s) orally once a day   aspirin 81 mg oral delayed release tablet: 1 tab(s) orally once a day  Coumadin 5 mg oral tablet: 1 tab(s) orally once a day Goal INR 2-3 followe nancyy Hakan  7377  famotidine 20 mg oral tablet: 1 tab(s) orally once a day  finasteride 5 mg oral tablet: 1 tab(s) orally once a day  guaiFENesin 100 mg/5 mL oral liquid: 5 milliliter(s) orally every 6 hours, As needed, Cough  lisinopril 5 mg oral tablet: 1 tab(s) orally once a day  metoprolol succinate 25 mg oral tablet, extended release: 1 tab(s) orally once a day  mirtazapine 7.5 mg oral tablet: 1 tab(s) orally once a day (at bedtime)  polyethylene glycol 3350 oral powder for reconstitution: 17 gram(s) orally once a day  senna oral tablet: 2 tab(s) orally once a day (at bedtime) PRN  tamsulosin 0.4 mg oral capsule: 1 cap(s) orally once a day (at bedtime) acetaminophen 325 mg oral tablet: 2 tab(s) orally every 6 hours, As needed, Temp greater or equal to 38C (100.4F), Moderate Pain (4 - 6)  amiodarone 200 mg oral tablet: 1 tab(s) orally once a day   aspirin 81 mg oral delayed release tablet: 1 tab(s) orally once a day  Coumadin 5 mg oral tablet: 1 tab(s) orally once a day Goal INR 2-3 followe nancyy Hakan  5320  famotidine 20 mg oral tablet: 1 tab(s) orally once a day  finasteride 5 mg oral tablet: 1 tab(s) orally once a day  guaiFENesin 100 mg/5 mL oral liquid: 5 milliliter(s) orally every 6 hours, As needed, Cough  lisinopril 5 mg oral tablet: 1 tab(s) orally once a day  metoprolol succinate 25 mg oral tablet, extended release: 1 tab(s) orally once a day  mirtazapine 7.5 mg oral tablet: 1 tab(s) orally once a day (at bedtime)  polyethylene glycol 3350 oral powder for reconstitution: 17 gram(s) orally once a day  senna oral tablet: 2 tab(s) orally once a day (at bedtime) PRN  tamsulosin 0.4 mg oral capsule: 1 cap(s) orally once a day (at bedtime) acetaminophen 325 mg oral tablet: 2 tab(s) orally every 6 hours, As needed, Temp greater or equal to 38C (100.4F), Moderate Pain (4 - 6)  amiodarone 200 mg oral tablet: 1 tab(s) orally once a day   aspirin 81 mg oral delayed release tablet: 1 tab(s) orally once a day  Coumadin 5 mg oral tablet: 1 tab(s) orally once a day Goal INR 2-3 followe nancyy Hakan  8582  famotidine 20 mg oral tablet: 1 tab(s) orally once a day  finasteride 5 mg oral tablet: 1 tab(s) orally once a day  guaiFENesin 100 mg/5 mL oral liquid: 5 milliliter(s) orally every 6 hours, As needed, Cough  lisinopril 5 mg oral tablet: 1 tab(s) orally once a day  metoprolol succinate 25 mg oral tablet, extended release: 1 tab(s) orally once a day  mirtazapine 7.5 mg oral tablet: 1 tab(s) orally once a day (at bedtime)  polyethylene glycol 3350 oral powder for reconstitution: 17 gram(s) orally once a day  senna oral tablet: 2 tab(s) orally once a day (at bedtime) PRN  tamsulosin 0.4 mg oral capsule: 1 cap(s) orally once a day (at bedtime) acetaminophen 325 mg oral tablet: 2 tab(s) orally every 6 hours, As needed, Temp greater or equal to 38C (100.4F), Moderate Pain (4 - 6)  amiodarone 200 mg oral tablet: 1 tab(s) orally once a day   aspirin 81 mg oral delayed release tablet: 1 tab(s) orally once a day  Coumadin 5 mg oral tablet: 1 tab(s) orally once a day Goal INR 2-3 followe philomena Mcclendon  9477  famotidine 20 mg oral tablet: 1 tab(s) orally once a day  finasteride 5 mg oral tablet: 1 tab(s) orally once a day  guaiFENesin 100 mg/5 mL oral liquid: 5 milliliter(s) orally every 6 hours, As needed, Cough  metoprolol succinate 25 mg oral tablet, extended release: 1 tab(s) orally once a day  mirtazapine 7.5 mg oral tablet: 1 tab(s) orally once a day (at bedtime)  polyethylene glycol 3350 oral powder for reconstitution: 17 gram(s) orally once a day

## 2020-04-24 NOTE — DISCHARGE NOTE PROVIDER - CARE PROVIDER_API CALL
Tamanna Mcclendon (NP; RN)  NP in Adult Health  [address] [place] [address]  Phone: [phone]  Fax: [phone]  Follow Up Time: 1 week    Remi Rowe)  Orthopaedic Surgery  [address] [place] [address] [address]  [place] [address]  Phone: [phone]  Fax: [phone]  Follow Up Time: 2 weeks    Nissenbaum, Michael A (MD)  Neurology  [address] [facility] [address]  [place] [address]  Phone: [phone]  Fax: [phone]  Follow Up Time: 1 week Tamanna Mcclendon (NP; RN)  NP in Adult Health  [address] [place] [address]  Phone: [phone]  Fax: [phone]  Follow Up Time: 1 week    Remi Rowe)  Orthopaedic Surgery  [address] [place] [address] [address]  [place] [address]  Phone: [phone]  Fax: [phone]  Follow Up Time: 2 weeks    Nissenbaum, Michael A (MD)  Neurology  [address] [facility] [address]  [place] [address]  Phone: [phone]  Fax: [phone]  Follow Up Time: 2 weeks

## 2020-04-24 NOTE — DISCHARGE NOTE PROVIDER - NSDCPNSUBOBJ_GEN_ALL_CORE
Subjective: Patient seen and examined resting in bed. ON no issues. Today states that he is concerned he will fall again once home. Since his readmission his blood pressure medications were adjusted, his VSS have remained stable and he worked with PT to determine if he was safe to be discharged home again. Patient was cleared to be discharged home today by heart failure.         Medications:    acetaminophen   Tablet .. 650 milliGRAM(s) Oral every 6 hours    aMIOdarone    Tablet 200 milliGRAM(s) Oral daily    aspirin enteric coated 81 milliGRAM(s) Oral daily    famotidine    Tablet 20 milliGRAM(s) Oral daily    finasteride 5 milliGRAM(s) Oral daily    guaiFENesin   Syrup  (Sugar-Free) 100 milliGRAM(s) Oral every 6 hours PRN    metoprolol succinate ER 25 milliGRAM(s) Oral daily    mirtazapine 7.5 milliGRAM(s) Oral daily    polyethylene glycol 3350 17 Gram(s) Oral daily    senna 2 Tablet(s) Oral at bedtime    sodium chloride 0.9% lock flush 3 milliLiter(s) IV Push every 8 hours            Vitals:    Vital Signs Last 24 Hours    T(C): 36.7 (04-24-20 @ 10:58), Max: 36.9 (04-23-20 @ 14:22)    HR: 102 (04-24-20 @ 10:58) (93 - 102)    BP: 106/74 (04-24-20 @ 10:58) (92/64 - 106/74)    RR: 18 (04-24-20 @ 10:58) (18 - 18)    SpO2: 97% (04-24-20 @ 10:58) (96% - 98%)        Tele: 93-99        I&O's Summary        23 Apr 2020 07:01  -  24 Apr 2020 07:00    --------------------------------------------------------    IN: 1380 mL / OUT: 1440 mL / NET: -60 mL            Physical Exam    General: No distress. Comfortable.    HEENT: EOM intact grossly of rt eye. Left eye with periorbital edema, unable to open eye. guaze covering sutures that are in place in eyebrow    Neck: Neck supple. JVP not elevated. No masses    Chest: Clear to auscultation bilaterally    CV: +VAD hum    Abdomen: Soft, non-distended, non-tender    Skin: No rashes or skin breakdown, driveline dressing c/d/i    Neurology: Alert and oriented times three. Sensation intact    Psych: Affect normal    Extremities: L arm noted to be in sling            LVAD Interrogation    VAD TYPE HM2    Speed 8800    Flow 4    Power 4.5    PI  4    Assessment of driveline exit site: driveline dressing c/d/i     Programming changes: no program changes noted          Labs:                            10.1     4.95  )-----------( 141      ( 24 Apr 2020 06:00 )               30.8         04-24        138  |  101  |  15    ----------------------------<  100<H>    4.2   |  23  |  0.99        Ca    9.1      24 Apr 2020 06:00        TPro  7.2  /  Alb  3.6  /  TBili  0.2  /  DBili  x   /  AST  34  /  ALT  56<H>  /  AlkPhos  64  04-24        PT/INR - ( 24 Apr 2020 06:00 )   PT: 23.8 sec;   INR: 2.04 ratio               PTT - ( 24 Apr 2020 06:00 )  PTT:31.2 sec            Serum Pro-Brain Natriuretic Peptide: 182 pg/mL (04-23 @ 06:46)    Serum Pro-Brain Natriuretic Peptide: 348 pg/mL (04-21 @ 22:17)            Lactate Dehydrogenase, Serum: 264 U/L (04-24 @ 06:00)    Lactate Dehydrogenase, Serum: 285 U/L (04-23 @ 06:46)            Imaging Studies         < from: CT Head No Cont (04.22.20 @ 18:27) >    Impression:    No acute hemorrhage, mass effect or extra-axial collections.    New left supraorbital/left frontal extracalvarial soft tissue hematoma.    < end of copied text >        < from: Xray Shoulder 2 Views, Left (04.22.20 @ 15:35) >    IMPRESSION:     There is a fracture at the distal aspect of the left clavicle with cephalad displacement of distal fragment. No acute displaced fracture is seen in the proximal left humerus.    < end of copied text >            < from: Xray Chest 1 View AP/PA (04.21.20 @ 19:14) >    Impression:    The heart is normal in size. Platelike atelectasis right lower lobe. The left lung is clear. A left ventricular assisted device is present. Status post sternotomy.    < end of copied text >            Time spent with patient > 45 minutes

## 2020-04-24 NOTE — PROGRESS NOTE ADULT - ASSESSMENT
63 YO M with a history of ACC/AHA stage D HF due to NICM s/p HM2 LVAD on 9/12/17 as destination therapy (severe PAD), and recent COVID-19 with benign course who was admitted 4/21 after an episode of syncope which occurred while seated and resulted in fall with laceration requiring stiches over left eyebrow. Of note during recent hospitalization he had transient episode of AMS/syncope which was self resolving. He is currently normotensive and asymptomatic with normal head CT. Etiology of syncope unclear but potentially vasovagal or related to low BP for which we are liberalizing MAPs and stopping offending agents. Neurology following, will require outpatient follow up. Of note patient noted to have fracture at distal aspect of left clavicle, ortho is following and will require outpatient follow up.  PT assessed patient and patient is safe to be discharge home with PT services. Plan to discharge home today

## 2020-04-24 NOTE — PROGRESS NOTE ADULT - SUBJECTIVE AND OBJECTIVE BOX
Subjective: Patient seen and examined resting in bed. ON no issues. Today states that he is concerned he will fall again once home. Since his readmission his blood pressure medications were adjusted, his VSS have remained stable and he worked with PT to determine if he was safe to be discharged home again. Patient was cleared to be discharged home today by heart failure.     Medications:  acetaminophen   Tablet .. 650 milliGRAM(s) Oral every 6 hours  aMIOdarone    Tablet 200 milliGRAM(s) Oral daily  aspirin enteric coated 81 milliGRAM(s) Oral daily  famotidine    Tablet 20 milliGRAM(s) Oral daily  finasteride 5 milliGRAM(s) Oral daily  guaiFENesin   Syrup  (Sugar-Free) 100 milliGRAM(s) Oral every 6 hours PRN  metoprolol succinate ER 25 milliGRAM(s) Oral daily  mirtazapine 7.5 milliGRAM(s) Oral daily  polyethylene glycol 3350 17 Gram(s) Oral daily  senna 2 Tablet(s) Oral at bedtime  sodium chloride 0.9% lock flush 3 milliLiter(s) IV Push every 8 hours      Vitals:  Vital Signs Last 24 Hours  T(C): 36.7 (04-24-20 @ 10:58), Max: 36.9 (04-23-20 @ 14:22)  HR: 102 (04-24-20 @ 10:58) (93 - 102)  BP: 106/74 (04-24-20 @ 10:58) (92/64 - 106/74)  RR: 18 (04-24-20 @ 10:58) (18 - 18)  SpO2: 97% (04-24-20 @ 10:58) (96% - 98%)    Tele: 93-99    I&O's Summary    23 Apr 2020 07:01  -  24 Apr 2020 07:00  --------------------------------------------------------  IN: 1380 mL / OUT: 1440 mL / NET: -60 mL      Physical Exam  General: No distress. Comfortable.  HEENT: EOM intact grossly of rt eye. Left eye with periorbital edema, unable to open eye. guaze covering sutures that are in place in eyebrow  Neck: Neck supple. JVP not elevated. No masses  Chest: Clear to auscultation bilaterally  CV: +VAD hum  Abdomen: Soft, non-distended, non-tender  Skin: No rashes or skin breakdown, driveline dressing c/d/i  Neurology: Alert and oriented times three. Sensation intact  Psych: Affect normal  Extremities: L arm noted to be in sling      LVAD Interrogation  VAD TYPE HM2  Speed 8800  Flow 4  Power 4.5  PI  4  Assessment of driveline exit site: driveline dressing c/d/i   Programming changes: no program changes noted      Labs:                        10.1   4.95  )-----------( 141      ( 24 Apr 2020 06:00 )             30.8     04-24    138  |  101  |  15  ----------------------------<  100<H>  4.2   |  23  |  0.99    Ca    9.1      24 Apr 2020 06:00    TPro  7.2  /  Alb  3.6  /  TBili  0.2  /  DBili  x   /  AST  34  /  ALT  56<H>  /  AlkPhos  64  04-24    PT/INR - ( 24 Apr 2020 06:00 )   PT: 23.8 sec;   INR: 2.04 ratio         PTT - ( 24 Apr 2020 06:00 )  PTT:31.2 sec      Serum Pro-Brain Natriuretic Peptide: 182 pg/mL (04-23 @ 06:46)  Serum Pro-Brain Natriuretic Peptide: 348 pg/mL (04-21 @ 22:17)      Lactate Dehydrogenase, Serum: 264 U/L (04-24 @ 06:00)  Lactate Dehydrogenase, Serum: 285 U/L (04-23 @ 06:46)      Imaging Studies     < from: CT Head No Cont (04.22.20 @ 18:27) >  Impression:  No acute hemorrhage, mass effect or extra-axial collections.  New left supraorbital/left frontal extracalvarial soft tissue hematoma.  < end of copied text >    < from: Xray Shoulder 2 Views, Left (04.22.20 @ 15:35) >  IMPRESSION:   There is a fracture at the distal aspect of the left clavicle with cephalad displacement of distal fragment. No acute displaced fracture is seen in the proximal left humerus.  < end of copied text >      < from: Xray Chest 1 View AP/PA (04.21.20 @ 19:14) >  Impression:  The heart is normal in size. Platelike atelectasis right lower lobe. The left lung is clear. A left ventricular assisted device is present. Status post sternotomy.  < end of copied text >

## 2020-04-24 NOTE — PROGRESS NOTE ADULT - PROBLEM SELECTOR PLAN 1
- Neurology following, no organic cause of syncope  - Repeat Head CT 4/22 with left supraorbital/left frontal hematoma   - Continue to hold lisinopril to allow for higher MAPs  - Continue to hold  tamsulosin  - Discontinue tamsulosin today as it can contribute to hypotension   - PT/OT following- ok for patient to be discharged home  - Left shoulder x-ray shows fracture at distal aspect of left clavicle  - Ortho consulted, appreciate recommendations. Will require outpatient followup

## 2020-04-24 NOTE — DISCHARGE NOTE PROVIDER - NSDCCPCAREPLAN_GEN_ALL_CORE_FT
PRINCIPAL DISCHARGE DIAGNOSIS  Diagnosis: Syncope  Assessment and Plan of Treatment: PRINCIPAL DISCHARGE DIAGNOSIS  Diagnosis: Fall  Assessment and Plan of Treatment: . gabriele      SECONDARY DISCHARGE DIAGNOSES  Diagnosis: Hypotension  Assessment and Plan of Treatment: PRINCIPAL DISCHARGE DIAGNOSIS  Diagnosis: Fall  Assessment and Plan of Treatment: 1. Change the percutanous lead exit site dressing as instructed using strile technique.   2. Inspect the percutaneous lead exit during dressing change for sings and symptoms of infection, such as redness, swelling, discharge, ordor, or warmth.  3. Call MD for a fever greater than 100.5 degrees or below 96 degrees  4. Record BP, LVAD speed, power, PI abd flow daily   5. Do Not twist or kink your percutanous lead.   6. Inspect the percutaneous lead connection to the  and make sure the perc lock is in the locked position daily.  Do NOT take tub baths or go swimming with LOVAD  7. Showers are allowed only after approved by your medical provider.  Do Not take showers without using the [employer] shower bag to protect your equipment from water.      SECONDARY DISCHARGE DIAGNOSES  Diagnosis: Hypotension  Assessment and Plan of Treatment: Continue with your blood pressure medications; eat a heart healthy diet with low salt diet; exercise regularly (consult with your physician or cardiologist first); maintain a heart healthy weight; if you smoke - quit (A resource to help you stop smoking is the [facility] – phone number [phone].); include healthy ways to manage stress. Continue to follow with your primary care physician or cardiologist.

## 2020-04-24 NOTE — PROGRESS NOTE ADULT - ATTENDING COMMENTS
He continues to be asymptomatic and MAPs within normal limits. Will continue to hold lisinopril and tamsulosin. Will confirm stability of resources for home discharge and plan for d/c today. Ortho f/u for arm and pain control with acetaminophen. Instructed to call with change in symptoms. VAD telehealth f/u in 1 week.
Ambulated 50 feet with PT today. Denies dizziness or LH. Etiology of syncope likely vasovagal, we have stopped lisinopril to liberalize MAPs and stopped tamsulosin. Ortho f/u for arm. Likely d/c tomorrow

## 2020-04-27 ENCOUNTER — APPOINTMENT (OUTPATIENT)
Dept: CARDIOLOGY | Facility: CLINIC | Age: 65
End: 2020-04-27
Payer: MEDICARE

## 2020-04-27 DIAGNOSIS — S42.009G FRACTURE OF UNSPECIFIED PART OF UNSPECIFIED CLAVICLE, SUBSEQUENT ENCOUNTER FOR FRACTURE WITH DELAYED HEALING: ICD-10-CM

## 2020-04-27 LAB
ALBUMIN SERPL ELPH-MCNC: 4 G/DL
ALP BLD-CCNC: 72 U/L
ALT SERPL-CCNC: 36 U/L
ANION GAP SERPL CALC-SCNC: 13 MMOL/L
AST SERPL-CCNC: 25 U/L
BASOPHILS # BLD AUTO: 0.03 K/UL
BASOPHILS NFR BLD AUTO: 0.5 %
BILIRUB SERPL-MCNC: 0.4 MG/DL
BUN SERPL-MCNC: 17 MG/DL
CALCIUM SERPL-MCNC: 9.3 MG/DL
CHLORIDE SERPL-SCNC: 100 MMOL/L
CO2 SERPL-SCNC: 24 MMOL/L
CREAT SERPL-MCNC: 1.26 MG/DL
EOSINOPHIL # BLD AUTO: 0.18 K/UL
EOSINOPHIL NFR BLD AUTO: 3.2 %
GLUCOSE SERPL-MCNC: 91 MG/DL
HCT VFR BLD CALC: 34.7 %
HGB BLD-MCNC: 11.2 G/DL
IMM GRANULOCYTES NFR BLD AUTO: 0.2 %
LDH SERPL-CCNC: 273 U/L
LYMPHOCYTES # BLD AUTO: 1.22 K/UL
LYMPHOCYTES NFR BLD AUTO: 21.7 %
MAN DIFF?: NORMAL
MCHC RBC-ENTMCNC: 29.8 PG
MCHC RBC-ENTMCNC: 32.3 GM/DL
MCV RBC AUTO: 92.3 FL
MONOCYTES # BLD AUTO: 0.66 K/UL
MONOCYTES NFR BLD AUTO: 11.7 %
NEUTROPHILS # BLD AUTO: 3.52 K/UL
NEUTROPHILS NFR BLD AUTO: 62.7 %
PLATELET # BLD AUTO: 196 K/UL
POTASSIUM SERPL-SCNC: 4.3 MMOL/L
PROT SERPL-MCNC: 7.4 G/DL
RBC # BLD: 3.76 M/UL
RBC # FLD: 16.9 %
SODIUM SERPL-SCNC: 138 MMOL/L
WBC # FLD AUTO: 5.62 K/UL

## 2020-04-27 PROCEDURE — 99214 OFFICE O/P EST MOD 30 MIN: CPT

## 2020-04-27 PROCEDURE — 93750 INTERROGATION VAD IN PERSON: CPT

## 2020-04-27 RX ORDER — SILODOSIN 8 MG/1
8 CAPSULE ORAL
Qty: 90 | Refills: 3 | Status: DISCONTINUED | COMMUNITY
Start: 2019-02-25 | End: 2020-04-27

## 2020-04-27 RX ORDER — TROSPIUM CHLORIDE 20 MG/1
20 TABLET, FILM COATED ORAL
Qty: 30 | Refills: 11 | Status: DISCONTINUED | COMMUNITY
Start: 2019-04-15 | End: 2020-04-27

## 2020-04-27 RX ORDER — METOPROLOL SUCCINATE 200 MG/1
200 TABLET, EXTENDED RELEASE ORAL DAILY
Qty: 90 | Refills: 3 | Status: DISCONTINUED | COMMUNITY
Start: 2018-01-11 | End: 2020-04-27

## 2020-04-27 RX ORDER — LISINOPRIL 20 MG/1
20 TABLET ORAL
Qty: 180 | Refills: 3 | Status: DISCONTINUED | COMMUNITY
Start: 2017-11-30 | End: 2020-04-27

## 2020-04-27 NOTE — REASON FOR VISIT
[Follow-Up - From Hospitalization] : follow-up of a recent hospitalization for [Cardiomyopathy] : cardiomyopathy [Heart Failure] : congestive heart failure [Discharge Date: ___] : Discharge Date: [unfilled] [FreeTextEntry1] : LVAD [FreeTextEntry2] : Covid + plus fall

## 2020-04-27 NOTE — PHYSICAL EXAM
[General Appearance - Well Nourished] : well nourished [General Appearance - Well Developed] : well developed [Normal Oral Mucosa] : normal oral mucosa [Auscultation Breath Sounds / Voice Sounds] : lungs were clear to auscultation bilaterally [Respiration, Rhythm And Depth] : normal respiratory rhythm and effort [Heart Rate And Rhythm] : heart rate and rhythm were normal [Bowel Sounds] : normal bowel sounds [Abdomen Tenderness] : non-tender [Abdomen Soft] : soft [Abnormal Walk] : normal gait [Nail Clubbing] : no clubbing of the fingernails [Cyanosis, Localized] : no localized cyanosis [Skin Color & Pigmentation] : normal skin color and pigmentation [] : no rash [No Anxiety] : not feeling anxious [Oriented To Time, Place, And Person] : oriented to person, place, and time [FreeTextEntry1] : wearing sling on left arm for fx clavicle

## 2020-04-27 NOTE — DISCUSSION/SUMMARY
[Patient] : the patient [___ Week(s)] : [unfilled] week(s) [FreeTextEntry1] : - LVAD: continue speed at 8800. Appears to be pulsatilie. Repeat an Echo at next clinic visit. He may be a candidate for LVAD turndown evaluation. \par - Congestive heart failure: discontinue Lisinopril 20 mg twice/day and Metoprolol at currently doses. Will add furosemide 20 mg daily.. Patient was not on Metoprolol or Amio I rewrote his scripts and he will resume.\par - Coumadin: Monitor INR closely and adjust warfarin accordingly, INR goal is 2.0-2.5. If needs to be adjusted prior to cataract surgery, please inform us and we can adjust accordingly\par - Antiplatelets: Continue aspirin at 81mg every day. If needs to be held for 3-5 days prior to cataract surgery then can be held. \par - PAD: Ongoing treatment by Dr. Lovelace. Since improved, no indication for revascularization\par - CKD stage III with hyperkalemia; improved from prior \par - Education of LVAD system maintenance was reviewed with patient.\par - Supplies: Gary [FreeTextEntry3] : LVAD clinic to removesuture in eyebrow

## 2020-04-27 NOTE — HISTORY OF PRESENT ILLNESS
[FreeTextEntry1] : Mr. Quispe is a 64 year old man with ACC/AHA stage D systolic heart failure due to a nonischemic cardiomyopathy who presented with abdominal pain and cardiogenic shock in the fall of 2017. Due to inotropic dependence, he underwent HM2 LVAD implantation as destination therapy with TV annuloplasty ring on 9/12/2017. His post-operative course was largely uncomplicated. He had mild rectal bleeding due to internal hemorrhoids. He had one readmission due to infection of unclear etiology in December 2017. It was not thought to be related to the LVAD. His history is also notable for peripheral arterial disease with claudication, which is a contributing factor in the decision to not list him for heart transplantation. \par Patient was admitted to the hospital from  March 31 through 4/20 with COVID + was having low bp and meds were discontinued.\par He was discharged to home on 4/20 and on Tuesday 4/21 he was back at the hospital after a fall with a laceration to his Left eye and a hematoma. His BP was low and meds adjusted. He was again discharged to home on 4/24.\par He is here today as he was told to follow up today. \par He reports he is overall better. He is not dizzy. He has no dyspnea. He denies PND or orthopnea. He has no blood in his stools or urine. He denies LVAD alarms. \par \par LVAD Interrogation:\par Device Type: HM2\par Speed: 8800\par Flow: 4.2\par Power: 4.7\par PI: 7.2\par MAP: 94 (/85); Doppler MAP 80\par Back up battery: Primary Battery [id number] Expires in 24 months \par Driveline dressing changed no erythema, drainage noted

## 2020-04-28 LAB
INR PPP: 2.23 RATIO
PT BLD: 26.1 SEC

## 2020-05-07 ENCOUNTER — APPOINTMENT (OUTPATIENT)
Dept: HEART FAILURE | Facility: CLINIC | Age: 65
End: 2020-05-07
Payer: MEDICARE

## 2020-05-07 DIAGNOSIS — S42.009A FRACTURE OF UNSPECIFIED PART OF UNSPECIFIED CLAVICLE, INITIAL ENCOUNTER FOR CLOSED FRACTURE: ICD-10-CM

## 2020-05-07 LAB
ALBUMIN SERPL ELPH-MCNC: 4.3 G/DL
ALP BLD-CCNC: 87 U/L
ALT SERPL-CCNC: 52 U/L
ANION GAP SERPL CALC-SCNC: 12 MMOL/L
AST SERPL-CCNC: 39 U/L
BASOPHILS # BLD AUTO: 0.08 K/UL
BASOPHILS NFR BLD AUTO: 1.7 %
BILIRUB SERPL-MCNC: 0.4 MG/DL
BUN SERPL-MCNC: 8 MG/DL
CALCIUM SERPL-MCNC: 9.1 MG/DL
CHLORIDE SERPL-SCNC: 102 MMOL/L
CO2 SERPL-SCNC: 25 MMOL/L
CREAT SERPL-MCNC: 1.08 MG/DL
EOSINOPHIL # BLD AUTO: 0.23 K/UL
EOSINOPHIL NFR BLD AUTO: 4.8 %
GLUCOSE SERPL-MCNC: 92 MG/DL
HCT VFR BLD CALC: 38.5 %
HGB BLD-MCNC: 11.8 G/DL
IMM GRANULOCYTES NFR BLD AUTO: 0.2 %
INR PPP: 3.23 RATIO
LDH SERPL-CCNC: 339 U/L
LYMPHOCYTES # BLD AUTO: 1.36 K/UL
LYMPHOCYTES NFR BLD AUTO: 28.3 %
MAN DIFF?: NORMAL
MCHC RBC-ENTMCNC: 29.7 PG
MCHC RBC-ENTMCNC: 30.6 GM/DL
MCV RBC AUTO: 97 FL
MONOCYTES # BLD AUTO: 0.67 K/UL
MONOCYTES NFR BLD AUTO: 13.9 %
NEUTROPHILS # BLD AUTO: 2.46 K/UL
NEUTROPHILS NFR BLD AUTO: 51.1 %
PLATELET # BLD AUTO: 316 K/UL
POTASSIUM SERPL-SCNC: 4.5 MMOL/L
PROT SERPL-MCNC: 7.9 G/DL
PT BLD: 38.2 SEC
RBC # BLD: 3.97 M/UL
RBC # FLD: 17.6 %
SODIUM SERPL-SCNC: 140 MMOL/L
WBC # FLD AUTO: 4.81 K/UL

## 2020-05-07 PROCEDURE — 99214 OFFICE O/P EST MOD 30 MIN: CPT

## 2020-05-07 PROCEDURE — 93750 INTERROGATION VAD IN PERSON: CPT

## 2020-05-07 RX ORDER — FUROSEMIDE 20 MG/1
20 TABLET ORAL DAILY
Qty: 30 | Refills: 0 | Status: DISCONTINUED | COMMUNITY
Start: 2020-03-05 | End: 2020-05-07

## 2020-05-07 NOTE — REASON FOR VISIT
[Cardiomyopathy] : cardiomyopathy [Follow-Up - From Hospitalization] : follow-up of a recent hospitalization for [Heart Failure] : congestive heart failure [Discharge Date: ___] : Discharge Date: [unfilled] [FreeTextEntry1] : LVAD [FreeTextEntry2] : Covid + plus fall

## 2020-05-07 NOTE — HISTORY OF PRESENT ILLNESS
[FreeTextEntry1] : Mr. Quispe is a 64 year old man with ACC/AHA stage D systolic heart failure due to a nonischemic cardiomyopathy who presented with abdominal pain and cardiogenic shock in the fall of 2017. Due to inotropic dependence, he underwent HM2 LVAD implantation as destination therapy with TV annuloplasty ring on 9/12/2017. His post-operative course was largely uncomplicated. He had mild rectal bleeding due to internal hemorrhoids. He had one readmission due to infection of unclear etiology in December 2017. It was not thought to be related to the LVAD. His history is also notable for peripheral arterial disease with claudication, which is a contributing factor in the decision to not list him for heart transplantation. \par Patient was admitted to the hospital from  March 31 through 4/20 with COVID + was having low bp and meds were discontinued.\par He was discharged to home on 4/20 and on Tuesday 4/21 he was back at the hospital after a fall with a laceration to his Left eye and a hematoma. His BP was low and meds adjusted. He was again discharged to home on 4/24.\par Today,  \par He reports he is overall better. He is not dizzy. He has no dyspnea. He denies PND or orthopnea. He has no blood in his stools or urine. He denies LVAD alarms. He states he can now walk 10 city blocks without pain in his legs.\par \par LVAD Interrogation:\par Device Type: HM2\par Speed: 8800\par Flow: 5.4\par Power: 5.1\par PI: 6.6\par MAP: 90 (/80, 112/87,113/79 )\par Back up battery: Primary Battery [id number] Expires in 23 months \par Driveline dressing changed no erythema,slight old  drainage noted

## 2020-05-07 NOTE — PHYSICAL EXAM
[General Appearance - Well Developed] : well developed [General Appearance - Well Nourished] : well nourished [Normal Oral Mucosa] : normal oral mucosa [Respiration, Rhythm And Depth] : normal respiratory rhythm and effort [Auscultation Breath Sounds / Voice Sounds] : lungs were clear to auscultation bilaterally [Heart Rate And Rhythm] : heart rate and rhythm were normal [Bowel Sounds] : normal bowel sounds [Abdomen Soft] : soft [Abnormal Walk] : normal gait [Abdomen Tenderness] : non-tender [Nail Clubbing] : no clubbing of the fingernails [Cyanosis, Localized] : no localized cyanosis [Skin Color & Pigmentation] : normal skin color and pigmentation [] : no rash [Oriented To Time, Place, And Person] : oriented to person, place, and time [No Anxiety] : not feeling anxious [FreeTextEntry1] : wearing sling on left arm for fx clavicle

## 2020-05-07 NOTE — DISCUSSION/SUMMARY
[Patient] : the patient [___ Month(s)] : [unfilled] month(s) [FreeTextEntry1] : - LVAD: continue speed at 8800. Appears to be pulsatilie. Repeat an Echo at next clinic visit. He may be a candidate for LVAD turndown evaluation. \par - Congestive heart failure: Resume  Lisinopril 5 mg once/day and Metoprolol at currently doses. \par - Coumadin: Monitor INR closely and adjust warfarin accordingly, INR goal is 2.0-2.5. \par - Antiplatelets: Continue aspirin at 81mg every day. \par - PAD: Ongoing treatment by Dr. Lovelace. Since improved, no indication for revascularization\par - CKD stage III with hyperkalemia; improved from prior \par -Clavicle FX: F/U with ortho phone number given\par - Education of LVAD system maintenance was reviewed with patient.\par - Supplies: Gary

## 2020-05-13 ENCOUNTER — NON-APPOINTMENT (OUTPATIENT)
Age: 65
End: 2020-05-13

## 2020-05-20 ENCOUNTER — NON-APPOINTMENT (OUTPATIENT)
Age: 65
End: 2020-05-20

## 2020-06-02 ENCOUNTER — NON-APPOINTMENT (OUTPATIENT)
Age: 65
End: 2020-06-02

## 2020-06-04 ENCOUNTER — APPOINTMENT (OUTPATIENT)
Dept: HEART FAILURE | Facility: CLINIC | Age: 65
End: 2020-06-04
Payer: MEDICARE

## 2020-06-04 ENCOUNTER — NON-APPOINTMENT (OUTPATIENT)
Age: 65
End: 2020-06-04

## 2020-06-04 VITALS
TEMPERATURE: 207.32 F | WEIGHT: 166 LBS | HEART RATE: 85 BPM | RESPIRATION RATE: 12 BRPM | HEIGHT: 70 IN | OXYGEN SATURATION: 100 % | BODY MASS INDEX: 23.77 KG/M2 | DIASTOLIC BLOOD PRESSURE: 64 MMHG | SYSTOLIC BLOOD PRESSURE: 91 MMHG

## 2020-06-04 LAB
ALBUMIN SERPL ELPH-MCNC: 4.7 G/DL
ALP BLD-CCNC: 74 U/L
ALT SERPL-CCNC: 14 U/L
ANION GAP SERPL CALC-SCNC: 11 MMOL/L
AST SERPL-CCNC: 21 U/L
BASOPHILS # BLD AUTO: 0.09 K/UL
BASOPHILS NFR BLD AUTO: 2 %
BILIRUB SERPL-MCNC: 0.2 MG/DL
BUN SERPL-MCNC: 9 MG/DL
CALCIUM SERPL-MCNC: 9.3 MG/DL
CHLORIDE SERPL-SCNC: 104 MMOL/L
CO2 SERPL-SCNC: 23 MMOL/L
CREAT SERPL-MCNC: 1.04 MG/DL
EOSINOPHIL # BLD AUTO: 0.31 K/UL
EOSINOPHIL NFR BLD AUTO: 6.8 %
GLUCOSE SERPL-MCNC: 93 MG/DL
HCT VFR BLD CALC: 38.2 %
HGB BLD-MCNC: 12.1 G/DL
IMM GRANULOCYTES NFR BLD AUTO: 0.2 %
INR PPP: 2.91 RATIO
LDH SERPL-CCNC: 287 U/L
LYMPHOCYTES # BLD AUTO: 1.64 K/UL
LYMPHOCYTES NFR BLD AUTO: 35.7 %
MAN DIFF?: NORMAL
MCHC RBC-ENTMCNC: 29.3 PG
MCHC RBC-ENTMCNC: 31.7 GM/DL
MCV RBC AUTO: 92.5 FL
MONOCYTES # BLD AUTO: 0.53 K/UL
MONOCYTES NFR BLD AUTO: 11.5 %
NEUTROPHILS # BLD AUTO: 2.01 K/UL
NEUTROPHILS NFR BLD AUTO: 43.8 %
NT-PROBNP SERPL-MCNC: 148 PG/ML
PLATELET # BLD AUTO: 203 K/UL
POTASSIUM SERPL-SCNC: 4.5 MMOL/L
PROT SERPL-MCNC: 8.1 G/DL
PT BLD: 34.3 SEC
RBC # BLD: 4.13 M/UL
RBC # FLD: 15.8 %
SODIUM SERPL-SCNC: 138 MMOL/L
WBC # FLD AUTO: 4.59 K/UL

## 2020-06-04 PROCEDURE — 99215 OFFICE O/P EST HI 40 MIN: CPT

## 2020-06-04 PROCEDURE — 93000 ELECTROCARDIOGRAM COMPLETE: CPT

## 2020-06-04 PROCEDURE — 93750 INTERROGATION VAD IN PERSON: CPT

## 2020-06-04 NOTE — HISTORY OF PRESENT ILLNESS
[FreeTextEntry1] : Mr. Quispe is a 64 year old man with ACC/AHA stage D systolic heart failure due to a nonischemic cardiomyopathy who presented with abdominal pain and cardiogenic shock in the fall of 2017. Due to inotropic dependence, he underwent HM2 LVAD implantation as destination therapy with TV annuloplasty ring on 9/12/2017. His post-operative course was largely uncomplicated. He had mild rectal bleeding due to internal hemorrhoids. He had one readmission due to infection of unclear etiology in December 2017. It was not thought to be related to the LVAD. His history is also notable for peripheral arterial disease with claudication, which is a contributing factor in the decision to not list him for heart transplantation. \par Patient was admitted to the hospital from  March 31 through 4/20 with COVID + was having low bp and meds were discontinued.\par He was discharged to home on 4/20 and on Tuesday 4/21 he was back at the hospital after a fall with a laceration to his Left eye and a hematoma. His BP was low and meds adjusted. He was again discharged to home on 4/24.\par Today,  \par He reports he is overall better. He is not dizzy. He has no dyspnea. He denies PND or orthopnea. He has no blood in his stools or urine. He denies LVAD alarms. He states he can now walk 10 city blocks without pain in his legs.\par \par LVAD Interrogation:\par Device Type: HM2\par Speed: 8800\par Flow: 4.3\par Power: 4.6\par PI: 7.6\par MAP: 80 (BP 91/64 )\par EKG: NST HR 80 no ectopy \par Back up battery: Primary Battery [id number] Expires in 9 months \par Driveline dressing changed no erythema, small purulent drainage noted, unable to culture

## 2020-06-04 NOTE — DISCUSSION/SUMMARY
[___ Month(s)] : [unfilled] month(s) [FreeTextEntry1] : - LVAD: continue speed at 8800. Appears to be pulsatilie.  \par - Congestive heart failure: Lisinopril 5 mg once/day and Metoprolol at currently doses. \par - Coumadin: Monitor INR closely and adjust warfarin accordingly, INR goal is 2.0-2.5. \par - Antiplatelets: Continue aspirin at 81mg every day. \par - PAD: Ongoing treatment by Dr. Lovelace. Since improved, no indication for revascularization\par - CKD stage III with hyperkalemia; improved from prior \par - Clavicle FX: F/U with ortho phone number given\par - Driveline drainage noted will continue to follow.\par - Education of LVAD system maintenance was reviewed with patient.\par - Supplies: Gary

## 2020-06-04 NOTE — PHYSICAL EXAM
[General Appearance - Well Developed] : well developed [General Appearance - Well Nourished] : well nourished [Normal Conjunctiva] : the conjunctiva exhibited no abnormalities [Normal Oral Mucosa] : normal oral mucosa [Respiration, Rhythm And Depth] : normal respiratory rhythm and effort [Auscultation Breath Sounds / Voice Sounds] : lungs were clear to auscultation bilaterally [Heart Sounds] : normal S1 and S2 [Bowel Sounds] : normal bowel sounds [Abdomen Soft] : soft [Abdomen Tenderness] : non-tender [Abnormal Walk] : normal gait [Nail Clubbing] : no clubbing of the fingernails [Cyanosis, Localized] : no localized cyanosis [Skin Color & Pigmentation] : normal skin color and pigmentation [] : no rash [Oriented To Time, Place, And Person] : oriented to person, place, and time [No Anxiety] : not feeling anxious [FreeTextEntry1] : dry skin

## 2020-06-05 LAB
SARS-COV-2 IGG SERPL IA-ACNC: 23.4 INDEX
SARS-COV-2 IGG SERPL QL IA: POSITIVE

## 2020-06-10 ENCOUNTER — NON-APPOINTMENT (OUTPATIENT)
Age: 65
End: 2020-06-10

## 2020-06-18 ENCOUNTER — RX RENEWAL (OUTPATIENT)
Age: 65
End: 2020-06-18

## 2020-07-09 ENCOUNTER — APPOINTMENT (OUTPATIENT)
Dept: HEART FAILURE | Facility: CLINIC | Age: 65
End: 2020-07-09

## 2020-07-09 ENCOUNTER — INPATIENT (INPATIENT)
Facility: HOSPITAL | Age: 65
LOS: 7 days | Discharge: HOME | DRG: 378 | End: 2020-07-17
Attending: THORACIC SURGERY (CARDIOTHORACIC VASCULAR SURGERY) | Admitting: THORACIC SURGERY (CARDIOTHORACIC VASCULAR SURGERY)
Payer: MEDICARE

## 2020-07-09 VITALS
RESPIRATION RATE: 16 BRPM | DIASTOLIC BLOOD PRESSURE: 64 MMHG | SYSTOLIC BLOOD PRESSURE: 91 MMHG | HEART RATE: 89 BPM | OXYGEN SATURATION: 98 % | TEMPERATURE: 98 F

## 2020-07-09 DIAGNOSIS — I50.22 CHRONIC SYSTOLIC (CONGESTIVE) HEART FAILURE: ICD-10-CM

## 2020-07-09 DIAGNOSIS — Z95.811 PRESENCE OF HEART ASSIST DEVICE: Chronic | ICD-10-CM

## 2020-07-09 DIAGNOSIS — Z95.811 PRESENCE OF HEART ASSIST DEVICE: ICD-10-CM

## 2020-07-09 DIAGNOSIS — D64.9 ANEMIA, UNSPECIFIED: ICD-10-CM

## 2020-07-09 DIAGNOSIS — Z98.890 OTHER SPECIFIED POSTPROCEDURAL STATES: Chronic | ICD-10-CM

## 2020-07-09 LAB
ALBUMIN SERPL ELPH-MCNC: 4.1 G/DL — SIGNIFICANT CHANGE UP (ref 3.3–5)
ALP SERPL-CCNC: 47 U/L — SIGNIFICANT CHANGE UP (ref 40–120)
ALT FLD-CCNC: 21 U/L — SIGNIFICANT CHANGE UP (ref 10–45)
ANION GAP SERPL CALC-SCNC: 15 MMOL/L — SIGNIFICANT CHANGE UP (ref 5–17)
APPEARANCE UR: CLEAR — SIGNIFICANT CHANGE UP
APTT BLD: 37.2 SEC — HIGH (ref 27.5–35.5)
AST SERPL-CCNC: 23 U/L — SIGNIFICANT CHANGE UP (ref 10–40)
BASOPHILS # BLD AUTO: 0.06 K/UL — SIGNIFICANT CHANGE UP (ref 0–0.2)
BASOPHILS NFR BLD AUTO: 0.9 % — SIGNIFICANT CHANGE UP (ref 0–2)
BILIRUB SERPL-MCNC: 0.2 MG/DL — SIGNIFICANT CHANGE UP (ref 0.2–1.2)
BILIRUB UR-MCNC: NEGATIVE — SIGNIFICANT CHANGE UP
BLD GP AB SCN SERPL QL: NEGATIVE — SIGNIFICANT CHANGE UP
BUN SERPL-MCNC: 26 MG/DL — HIGH (ref 7–23)
CALCIUM SERPL-MCNC: 8.8 MG/DL — SIGNIFICANT CHANGE UP (ref 8.4–10.5)
CHLORIDE SERPL-SCNC: 100 MMOL/L — SIGNIFICANT CHANGE UP (ref 96–108)
CO2 SERPL-SCNC: 19 MMOL/L — LOW (ref 22–31)
COLOR SPEC: SIGNIFICANT CHANGE UP
CREAT SERPL-MCNC: 1.12 MG/DL — SIGNIFICANT CHANGE UP (ref 0.5–1.3)
DIFF PNL FLD: NEGATIVE — SIGNIFICANT CHANGE UP
EOSINOPHIL # BLD AUTO: 0 K/UL — SIGNIFICANT CHANGE UP (ref 0–0.5)
EOSINOPHIL NFR BLD AUTO: 0 % — SIGNIFICANT CHANGE UP (ref 0–6)
GAS PNL BLDV: SIGNIFICANT CHANGE UP
GLUCOSE SERPL-MCNC: 120 MG/DL — HIGH (ref 70–99)
GLUCOSE UR QL: NEGATIVE — SIGNIFICANT CHANGE UP
HCT VFR BLD CALC: 17.2 % — CRITICAL LOW (ref 39–50)
HCT VFR BLD CALC: 25.2 % — LOW (ref 39–50)
HGB BLD-MCNC: 5.1 G/DL — CRITICAL LOW (ref 13–17)
HGB BLD-MCNC: 8 G/DL — LOW (ref 13–17)
INR BLD: 3.71 RATIO — HIGH (ref 0.88–1.16)
KETONES UR-MCNC: NEGATIVE — SIGNIFICANT CHANGE UP
LEUKOCYTE ESTERASE UR-ACNC: NEGATIVE — SIGNIFICANT CHANGE UP
LYMPHOCYTES # BLD AUTO: 0.91 K/UL — LOW (ref 1–3.3)
LYMPHOCYTES # BLD AUTO: 14.9 % — SIGNIFICANT CHANGE UP (ref 13–44)
MCHC RBC-ENTMCNC: 27.7 PG — SIGNIFICANT CHANGE UP (ref 27–34)
MCHC RBC-ENTMCNC: 29.2 PG — SIGNIFICANT CHANGE UP (ref 27–34)
MCHC RBC-ENTMCNC: 29.7 GM/DL — LOW (ref 32–36)
MCHC RBC-ENTMCNC: 31.7 GM/DL — LOW (ref 32–36)
MCV RBC AUTO: 92 FL — SIGNIFICANT CHANGE UP (ref 80–100)
MCV RBC AUTO: 93.5 FL — SIGNIFICANT CHANGE UP (ref 80–100)
MONOCYTES # BLD AUTO: 0.64 K/UL — SIGNIFICANT CHANGE UP (ref 0–0.9)
MONOCYTES NFR BLD AUTO: 10.5 % — SIGNIFICANT CHANGE UP (ref 2–14)
NEUTROPHILS # BLD AUTO: 4.53 K/UL — SIGNIFICANT CHANGE UP (ref 1.8–7.4)
NEUTROPHILS NFR BLD AUTO: 73.7 % — SIGNIFICANT CHANGE UP (ref 43–77)
NITRITE UR-MCNC: NEGATIVE — SIGNIFICANT CHANGE UP
NRBC # BLD: 2 /100 WBCS — HIGH (ref 0–0)
OB PNL STL: POSITIVE
PH UR: 7.5 — SIGNIFICANT CHANGE UP (ref 5–8)
PLATELET # BLD AUTO: 182 K/UL — SIGNIFICANT CHANGE UP (ref 150–400)
PLATELET # BLD AUTO: 206 K/UL — SIGNIFICANT CHANGE UP (ref 150–400)
POTASSIUM SERPL-MCNC: 4.2 MMOL/L — SIGNIFICANT CHANGE UP (ref 3.5–5.3)
POTASSIUM SERPL-SCNC: 4.2 MMOL/L — SIGNIFICANT CHANGE UP (ref 3.5–5.3)
PROT SERPL-MCNC: 7.3 G/DL — SIGNIFICANT CHANGE UP (ref 6–8.3)
PROT UR-MCNC: SIGNIFICANT CHANGE UP
PROTHROM AB SERPL-ACNC: 39.7 SEC — HIGH (ref 10.6–13.6)
RBC # BLD: 1.84 M/UL — LOW (ref 4.2–5.8)
RBC # BLD: 2.74 M/UL — LOW (ref 4.2–5.8)
RBC # FLD: 15.1 % — HIGH (ref 10.3–14.5)
RBC # FLD: 16.5 % — HIGH (ref 10.3–14.5)
RH IG SCN BLD-IMP: POSITIVE — SIGNIFICANT CHANGE UP
SODIUM SERPL-SCNC: 134 MMOL/L — LOW (ref 135–145)
SP GR SPEC: >1.05 (ref 1.01–1.02)
UROBILINOGEN FLD QL: NEGATIVE — SIGNIFICANT CHANGE UP
WBC # BLD: 6.14 K/UL — SIGNIFICANT CHANGE UP (ref 3.8–10.5)
WBC # BLD: 7.47 K/UL — SIGNIFICANT CHANGE UP (ref 3.8–10.5)
WBC # FLD AUTO: 6.14 K/UL — SIGNIFICANT CHANGE UP (ref 3.8–10.5)
WBC # FLD AUTO: 7.47 K/UL — SIGNIFICANT CHANGE UP (ref 3.8–10.5)

## 2020-07-09 PROCEDURE — 71045 X-RAY EXAM CHEST 1 VIEW: CPT | Mod: 26

## 2020-07-09 PROCEDURE — 74174 CTA ABD&PLVS W/CONTRAST: CPT | Mod: 26

## 2020-07-09 PROCEDURE — 71275 CT ANGIOGRAPHY CHEST: CPT | Mod: 26

## 2020-07-09 PROCEDURE — 93306 TTE W/DOPPLER COMPLETE: CPT | Mod: 26

## 2020-07-09 PROCEDURE — 99291 CRITICAL CARE FIRST HOUR: CPT

## 2020-07-09 RX ORDER — ACETAMINOPHEN 500 MG
650 TABLET ORAL EVERY 6 HOURS
Refills: 0 | Status: DISCONTINUED | OUTPATIENT
Start: 2020-07-09 | End: 2020-07-17

## 2020-07-09 RX ORDER — AMIODARONE HYDROCHLORIDE 400 MG/1
200 TABLET ORAL DAILY
Refills: 0 | Status: DISCONTINUED | OUTPATIENT
Start: 2020-07-09 | End: 2020-07-17

## 2020-07-09 RX ORDER — FAMOTIDINE 10 MG/ML
20 INJECTION INTRAVENOUS DAILY
Refills: 0 | Status: DISCONTINUED | OUTPATIENT
Start: 2020-07-09 | End: 2020-07-10

## 2020-07-09 RX ORDER — METOPROLOL TARTRATE 50 MG
25 TABLET ORAL DAILY
Refills: 0 | Status: DISCONTINUED | OUTPATIENT
Start: 2020-07-09 | End: 2020-07-17

## 2020-07-09 RX ORDER — FINASTERIDE 5 MG/1
5 TABLET, FILM COATED ORAL DAILY
Refills: 0 | Status: DISCONTINUED | OUTPATIENT
Start: 2020-07-09 | End: 2020-07-17

## 2020-07-09 RX ORDER — MIRTAZAPINE 45 MG/1
7.5 TABLET, ORALLY DISINTEGRATING ORAL AT BEDTIME
Refills: 0 | Status: DISCONTINUED | OUTPATIENT
Start: 2020-07-09 | End: 2020-07-17

## 2020-07-09 RX ORDER — SODIUM CHLORIDE 9 MG/ML
3 INJECTION INTRAMUSCULAR; INTRAVENOUS; SUBCUTANEOUS EVERY 8 HOURS
Refills: 0 | Status: DISCONTINUED | OUTPATIENT
Start: 2020-07-09 | End: 2020-07-17

## 2020-07-09 RX ORDER — LISINOPRIL 2.5 MG/1
5 TABLET ORAL DAILY
Refills: 0 | Status: DISCONTINUED | OUTPATIENT
Start: 2020-07-09 | End: 2020-07-17

## 2020-07-09 RX ADMIN — LISINOPRIL 5 MILLIGRAM(S): 2.5 TABLET ORAL at 21:33

## 2020-07-09 RX ADMIN — SODIUM CHLORIDE 3 MILLILITER(S): 9 INJECTION INTRAMUSCULAR; INTRAVENOUS; SUBCUTANEOUS at 21:05

## 2020-07-09 RX ADMIN — MIRTAZAPINE 7.5 MILLIGRAM(S): 45 TABLET, ORALLY DISINTEGRATING ORAL at 21:33

## 2020-07-09 RX ADMIN — FINASTERIDE 5 MILLIGRAM(S): 5 TABLET, FILM COATED ORAL at 21:33

## 2020-07-09 RX ADMIN — SODIUM CHLORIDE 3 MILLILITER(S): 9 INJECTION INTRAMUSCULAR; INTRAVENOUS; SUBCUTANEOUS at 13:29

## 2020-07-09 RX ADMIN — FAMOTIDINE 20 MILLIGRAM(S): 10 INJECTION INTRAVENOUS at 21:33

## 2020-07-09 NOTE — ED ADULT NURSE NOTE - CHPI ED NUR SYMPTOMS NEG
no dizziness/no fever/no nausea/no vomiting/no chills/no decreased eating/drinking/no tingling/no weakness

## 2020-07-09 NOTE — H&P ADULT - ASSESSMENT
63 y/o male PMH ACC/AHA stage D HF due to NICM HM2 LVAD , TV annuloplasty ring 9/12/17 as destination therapy due to severe peripheral artery disease with significant stenosis  SIADH, Depression, CKD-3 with hyperkalemia, past E. coli UTIs and recently hospitalized in April due to COVID-19. He overall was progressing well, however recently developed worsening lower back pain. Patient was to be seen in clinic today, however when he had morning labs drawn was noted to be hypotensive. He was then transferred to [facility] ED, noted to have H/H 5.1/17 with elevated INR 3.7. Fecal occult positive, will be transfused with 2uPRBC. Hemodynamically stable and no LVAD alarms, will be admitted to [facility] for further management.

## 2020-07-09 NOTE — H&P ADULT - NSICDXPASTMEDICALHX_GEN_ALL_CORE_FT
PAST MEDICAL HISTORY:  CAD (coronary artery disease)     CHF (congestive heart failure)     Depression     History of 2019 novel coronavirus disease (COVID-19) april 2020    Pleural effusion

## 2020-07-09 NOTE — ED PROVIDER NOTE - PROGRESS NOTE DETAILS
Sheikh: patient noted to have Hb of 5.1, will transfuse, LVAD PA at bedside, admitted to CT surgery, no tachycardia but awaiting doppler MAP

## 2020-07-09 NOTE — H&P ADULT - HISTORY OF PRESENT ILLNESS
63 y/o male PMH ACC/AHA stage D HF due to NICM HM2 LVAD , TV annuloplasty ring 9/12/17 as destination therapy due to severe peripheral artery disease with significant stenosis  SIADH, Depression, CKD-3 with hyperkalemia, past E. coli UTIs and recently hospitalized in April due to COVID-19. He overall was progressing well, however recently developed worsening lower back pain. Patient was to be seen in clinic today, however when he had morning labs drawn was noted to be hypotensive. He was then transferred to [facility] ED for further management. STAT labs were collected and showed H/H 5.1/17 with elevated INR 3.7. Denies any changes in his stool, no dizziness, n/v/f/c/d, SOB, or abdominal pain.

## 2020-07-09 NOTE — H&P ADULT - PROBLEM SELECTOR PLAN 1
- continue current speed @ 8800 rpm  - holding coumadin at this time, readmitted for elevated INR  - holding ASA 81 mg at this time due to GI bleed

## 2020-07-09 NOTE — ED PROVIDER NOTE - NS ED ROS FT
General: denies fever, chills  HENT: denies nasal congestion, rhinorrhea  Eyes: denies visual changes, blurred vision  CV: denies chest pain, palpitations  Resp: denies difficulty breathing, cough  Abdominal: denies nausea, vomiting, abdominal pain, melena  MSK: denies muscle aches, leg swelling  Neuro: denies headaches, numbness, tingling  Skin: denies rashes, bruises

## 2020-07-09 NOTE — ED PROVIDER NOTE - ATTENDING CONTRIBUTION TO CARE
I saw and evaluated patient with resident. I discussed H+P and MDM with resident. I agree with the statements made by the resident unless otherwise noted.    The care of this patient was in support of the [facility] countermeasures to Covid-19.

## 2020-07-09 NOTE — ED PROVIDER NOTE - CRITICAL CARE PROVIDED
additional history taking/documentation/consultation with other physicians/direct patient care (not related to procedure)

## 2020-07-09 NOTE — H&P ADULT - PROBLEM SELECTOR PLAN 2
- Re-admitted for H/H 5.1/17  - Transfuse 2uPRBC today, follow up with evening CBC  - CT A/P pending  - GI consult, appreciate recommendations  - Currently remains NPO

## 2020-07-09 NOTE — H&P ADULT - NSHPPHYSICALEXAM_GEN_ALL_CORE
Vitals:  Vital Signs Last 24 Hours  T(C): 37.4 (07-09-20 @ 12:29), Max: 37.4 (07-09-20 @ 12:29)  HR: 87 (07-09-20 @ 12:29) (87 - 89)  BP: 91/64 (07-09-20 @ 11:07) (91/64 - 91/64)  RR: 20 (07-09-20 @ 12:29) (16 - 20)  SpO2: 100% (07-09-20 @ 12:29) (98% - 100%)    Physical Exam   General: No distress. Comfortable.  HEENT: EOM intact.  Neck: Neck supple. JVP not elevated. No masses  Chest: Clear to auscultation bilaterally  CV: +VAD hum   Abdomen: Soft, non-distended, non-tender  Skin: No rashes or skin breakdown  Neurology: Alert and oriented times three. Sensation intact  Psych: Affect normal    LVAD Interrogation  VAD TYPE HM 2  Speed 8800  Flow 5.2  Power 5.1  PI  6.5  Assessment of driveline exit site: driveline dressing c/d/i  Programming changes: no programing changes made

## 2020-07-09 NOTE — ED ADULT NURSE NOTE - OBJECTIVE STATEMENT
65 year old male presented to ED with c/o lower back pain radiating to left leg and weakness. hx LVAD, on coumadin, CHF. pt reports LVAD is working properly, battery is charged, denies alarm going off on his LVAD. went to PMD today and referred to ED for evaluation. pt denies CP, SOB, nausea/vomiting, numbness/tingling, fever, cough, chills, dizziness, headache, blurred vision, neuro intact. pt a&ox3, lung sounds clear, heart rate regular, MAP 88 by using the doppler, abdomen soft nontender nondistended to palp. skin intact. IV In left AC 20G and patent. Will continue to monitor and assess while offering support and reassurance.

## 2020-07-09 NOTE — ED PROVIDER NOTE - CLINICAL SUMMARY MEDICAL DECISION MAKING FREE TEXT BOX
65M w/ LVAD for CHF presents w/ fatigue w/o other focal symptoms, denies h/o GI bleed, will obtain a doppler MAP, labs, reassess, back pain appears to be MSK in nature, could be sciatica, no red flags, no pain when laying down, will hold on meds for now 65M w/ LVAD for CHF presents w/ fatigue w/o other focal symptoms, denies h/o GI bleed, will obtain a doppler MAP, labs, reassess, back pain appears to be MSK in nature, could be sciatica, no red flags, no pain when laying down, will hold on meds for now    MORENITA Orourke MD: Pt is a 66 y/o male w/ CHF, LVAD, on coumadin, presents w/ a few weeks of feeling fatigued. Denies melena, dark stools, fevers, chills, SOB, chest pain, n/v/d. States he has not had any alarms going off on his LVAD. Went to get labs today and was referred to the ED. Reports R low back pain going down the R leg, feels sharp, worse with walking. No urinary sympptoms, no bowel or bladder incontinence, no lower extremity weakness or numbness 65M w/ LVAD for CHF presents w/ fatigue w/o other focal symptoms, denies h/o GI bleed, will obtain a doppler MAP, labs, reassess, back pain appears to be MSK in nature, could be sciatica, no red flags, no pain when laying down, will hold on meds for now    MORENITA Orourke MD: Pt is a 66 y/o male w/ CHF, LVAD, on coumadin, who p/w c/o a few weeks of fatigue, a few days of back pain going down RLE, sent in from lab for eval. States went to get labs drawn today, a BP was done and he was told to go to ED. Unsure about bloody or dark stools, states he doesn't look at it. Denies focal numbness/weakness, bowel/bladder incontinence, saddle anesthesia, IVDA, f/c. No red flag signs/sx concerning for cauda equina or cord compression. Back pain is worse when he walks. Pt is on coumadin. Pt denies: chest pain, SOB, cough, fevers, chills, pleuritic chest pain, abdominal pain, n/v/d, neck pain, HA, neck stiffness, focal numbness or weakness, visual changes, dizziness, lightheadedness, leg swelling, recent travel, recent trauma, recent immobilization, dysuria, hematuria, rash. Exam notable for LVAD with characteristic hum, +pallor, negative SLR on R. No LE edema. Ddx includes, however, is not limited to: GIB, anemia, metabolic d/o, infectious etiology, other cardiac d/o. Low suspicion for cord compression / cauda equina at this time. Plan: basic labs, coags, CXR, type and screen, guaiac stool, d/w LVAD coordinator

## 2020-07-09 NOTE — ED PROVIDER NOTE - SEVERE SEPSIS ALERT DETAILS
MORENITA Orourke MD:  I have seen and evaluated this patient and do not believe the patient is septic at this time.  I will continue to evaluate. Pt presents with anemia, GIB, LVAD, not eligible for large volume fluid resuscitation.

## 2020-07-09 NOTE — H&P ADULT - PROBLEM SELECTOR PLAN 3
- Continue home Lisinopril 5 mg PO and Metoprolol XL 25 mg PO daily   - Continue Amio 200 mg PO daily  - ECHO pending

## 2020-07-09 NOTE — H&P ADULT - NSHPLABSRESULTS_GEN_ALL_CORE
Labs:                        5.1    6.14  )-----------( 206      ( 09 Jul 2020 11:49 )             17.2     07-09    134<L>  |  100  |  26<H>  ----------------------------<  120<H>  4.2   |  19<L>  |  1.12    Ca    8.8      09 Jul 2020 11:49    TPro  7.3  /  Alb  4.1  /  TBili  0.2  /  DBili  x   /  AST  23  /  ALT  21  /  AlkPhos  47  07-09    PT/INR - ( 09 Jul 2020 11:49 )   PT: 39.7 sec;   INR: 3.71 ratio         PTT - ( 09 Jul 2020 11:49 )  PTT:37.2 sec      Lactate Dehydrogenase, Serum: 233 U/L (07-09 @ 11:49)      Imaging Studies

## 2020-07-09 NOTE — ED PROVIDER NOTE - OBJECTIVE STATEMENT
65M w/ CHF, LVAD, on coumadin, presents w/ a few weeks of feeling fatigued. Denies melena, dark stools, fevers, chills, SOB, chest pain, n/v/d. States he has not had any alarms going off on his LVAD. Went to get labs today and was referred to the ED. Reports R low back pain going down the R leg, feels sharp, worse with walking. No urinary sympptoms, no bowel or bladder incontinence, no lower extremity weakness or numbness

## 2020-07-09 NOTE — ED ADULT NURSE REASSESSMENT NOTE - NS ED NURSE REASSESS COMMENT FT1
blood transfusion administered at 1420, pulse and MAP pressure recorded by using doppler, blood consent in chart, administered with 2 nurses at the bedside. Will continue to monitor and assess while offering support and reassurance.

## 2020-07-10 DIAGNOSIS — K92.2 GASTROINTESTINAL HEMORRHAGE, UNSPECIFIED: ICD-10-CM

## 2020-07-10 LAB
ALBUMIN SERPL ELPH-MCNC: 3.8 G/DL — SIGNIFICANT CHANGE UP (ref 3.3–5)
ALP SERPL-CCNC: 47 U/L — SIGNIFICANT CHANGE UP (ref 40–120)
ALT FLD-CCNC: 17 U/L — SIGNIFICANT CHANGE UP (ref 10–45)
ANION GAP SERPL CALC-SCNC: 13 MMOL/L — SIGNIFICANT CHANGE UP (ref 5–17)
APTT BLD: 32.5 SEC — SIGNIFICANT CHANGE UP (ref 27.5–35.5)
AST SERPL-CCNC: 18 U/L — SIGNIFICANT CHANGE UP (ref 10–40)
BILIRUB SERPL-MCNC: 0.5 MG/DL — SIGNIFICANT CHANGE UP (ref 0.2–1.2)
BUN SERPL-MCNC: 20 MG/DL — SIGNIFICANT CHANGE UP (ref 7–23)
CALCIUM SERPL-MCNC: 8.3 MG/DL — LOW (ref 8.4–10.5)
CHLORIDE SERPL-SCNC: 98 MMOL/L — SIGNIFICANT CHANGE UP (ref 96–108)
CO2 SERPL-SCNC: 22 MMOL/L — SIGNIFICANT CHANGE UP (ref 22–31)
CREAT SERPL-MCNC: 1 MG/DL — SIGNIFICANT CHANGE UP (ref 0.5–1.3)
CULTURE RESULTS: SIGNIFICANT CHANGE UP
GLUCOSE SERPL-MCNC: 86 MG/DL — SIGNIFICANT CHANGE UP (ref 70–99)
HCT VFR BLD CALC: 25.2 % — LOW (ref 39–50)
HGB BLD-MCNC: 7.9 G/DL — LOW (ref 13–17)
INR BLD: 2.54 RATIO — HIGH (ref 0.88–1.16)
LDH SERPL L TO P-CCNC: 208 U/L — SIGNIFICANT CHANGE UP (ref 50–242)
MCHC RBC-ENTMCNC: 28.9 PG — SIGNIFICANT CHANGE UP (ref 27–34)
MCHC RBC-ENTMCNC: 31.3 GM/DL — LOW (ref 32–36)
MCV RBC AUTO: 92.3 FL — SIGNIFICANT CHANGE UP (ref 80–100)
NRBC # BLD: 1 /100 WBCS — HIGH (ref 0–0)
PLATELET # BLD AUTO: 170 K/UL — SIGNIFICANT CHANGE UP (ref 150–400)
POTASSIUM SERPL-MCNC: 3.7 MMOL/L — SIGNIFICANT CHANGE UP (ref 3.5–5.3)
POTASSIUM SERPL-SCNC: 3.7 MMOL/L — SIGNIFICANT CHANGE UP (ref 3.5–5.3)
PROT SERPL-MCNC: 6.8 G/DL — SIGNIFICANT CHANGE UP (ref 6–8.3)
PROTHROM AB SERPL-ACNC: 27.7 SEC — HIGH (ref 10.6–13.6)
RBC # BLD: 2.73 M/UL — LOW (ref 4.2–5.8)
RBC # FLD: 15.2 % — HIGH (ref 10.3–14.5)
SARS-COV-2 RNA SPEC QL NAA+PROBE: SIGNIFICANT CHANGE UP
SODIUM SERPL-SCNC: 133 MMOL/L — LOW (ref 135–145)
SPECIMEN SOURCE: SIGNIFICANT CHANGE UP
WBC # BLD: 6.78 K/UL — SIGNIFICANT CHANGE UP (ref 3.8–10.5)
WBC # FLD AUTO: 6.78 K/UL — SIGNIFICANT CHANGE UP (ref 3.8–10.5)

## 2020-07-10 PROCEDURE — 99222 1ST HOSP IP/OBS MODERATE 55: CPT

## 2020-07-10 PROCEDURE — 93750 INTERROGATION VAD IN PERSON: CPT

## 2020-07-10 PROCEDURE — 93010 ELECTROCARDIOGRAM REPORT: CPT

## 2020-07-10 PROCEDURE — 99223 1ST HOSP IP/OBS HIGH 75: CPT | Mod: 25

## 2020-07-10 RX ORDER — PANTOPRAZOLE SODIUM 20 MG/1
40 TABLET, DELAYED RELEASE ORAL
Refills: 0 | Status: DISCONTINUED | OUTPATIENT
Start: 2020-07-10 | End: 2020-07-17

## 2020-07-10 RX ORDER — POTASSIUM CHLORIDE 20 MEQ
30 PACKET (EA) ORAL ONCE
Refills: 0 | Status: COMPLETED | OUTPATIENT
Start: 2020-07-10 | End: 2020-07-10

## 2020-07-10 RX ADMIN — MIRTAZAPINE 7.5 MILLIGRAM(S): 45 TABLET, ORALLY DISINTEGRATING ORAL at 21:05

## 2020-07-10 RX ADMIN — Medication 30 MILLIEQUIVALENT(S): at 11:31

## 2020-07-10 RX ADMIN — Medication 25 MILLIGRAM(S): at 05:17

## 2020-07-10 RX ADMIN — SODIUM CHLORIDE 3 MILLILITER(S): 9 INJECTION INTRAMUSCULAR; INTRAVENOUS; SUBCUTANEOUS at 05:17

## 2020-07-10 RX ADMIN — SODIUM CHLORIDE 3 MILLILITER(S): 9 INJECTION INTRAMUSCULAR; INTRAVENOUS; SUBCUTANEOUS at 21:05

## 2020-07-10 RX ADMIN — FAMOTIDINE 20 MILLIGRAM(S): 10 INJECTION INTRAVENOUS at 07:44

## 2020-07-10 RX ADMIN — LISINOPRIL 5 MILLIGRAM(S): 2.5 TABLET ORAL at 05:17

## 2020-07-10 RX ADMIN — AMIODARONE HYDROCHLORIDE 200 MILLIGRAM(S): 400 TABLET ORAL at 05:17

## 2020-07-10 RX ADMIN — SODIUM CHLORIDE 3 MILLILITER(S): 9 INJECTION INTRAMUSCULAR; INTRAVENOUS; SUBCUTANEOUS at 07:42

## 2020-07-10 RX ADMIN — FINASTERIDE 5 MILLIGRAM(S): 5 TABLET, FILM COATED ORAL at 07:44

## 2020-07-10 NOTE — PROGRESS NOTE ADULT - SUBJECTIVE AND OBJECTIVE BOX
VITAL SIGNS    Telemetry: SR 70-90's   Vital Signs Last 24 Hrs  T(C): 36.9 (07-10-20 @ 09:32), Max: 37.4 (20 @ 12:29)  T(F): 98.4 (07-10-20 @ 09:32), Max: 99.3 (20 @ 12:29)  HR: 70 (07-10-20 @ 09:32) (60 - 87)  BP: 94/66 (07-10-20 @ 09:32) (94/66 - 105/71)  RR: 18 (07-10-20 @ 09:32) (16 - 20)  SpO2: 100% (07-10-20 @ 09:32) (100% - 100%)             @ 07:01  -  07-10 @ 07:00  --------------------------------------------------------  IN: 590 mL / OUT: 400 mL / NET: 190 mL    07-10 @ 07:01  -  07-10 @ 11:52  --------------------------------------------------------  IN: 300 mL / OUT: 400 mL / NET: -100 mL       Daily     Daily Weight in k.7 (10 Jul 2020 07:34)  Admit Wt: Drug Dosing Weight  Height (cm): 154.9 (2020 10:01)  Weight (kg): 72 (2020 10:01)  BMI (kg/m2): 30 (2020 10:01)  BSA (m2): 1.71 (2020 10:)    Bilirubin Total, Serum: 0.5 mg/dL (07-10 @ 06:18)    CAPILLARY BLOOD GLUCOSE      MEDICATIONS  acetaminophen   Tablet .. 650 milliGRAM(s) Oral every 6 hours PRN  aMIOdarone    Tablet 200 milliGRAM(s) Oral daily  famotidine    Tablet 20 milliGRAM(s) Oral daily  finasteride 5 milliGRAM(s) Oral daily  lisinopril 5 milliGRAM(s) Oral daily  metoprolol succinate ER 25 milliGRAM(s) Oral daily  mirtazapine 7.5 milliGRAM(s) Oral at bedtime  sodium chloride 0.9% lock flush 3 milliLiter(s) IV Push every 8 hours      >>> <<<  PHYSICAL EXAM  Subjective: " Hi." Denies CP, SOB, Dizziness, Palpitations, Chills, Nausea, Vomiting, Diarrhea, Headache, weakness, hematochezia or back pain.   Neurology: alert and oriented x 3, nonfocal, no gross deficits  CV : +VAD hum   Lungs: CTABL, non labored respirations   Abdomen: soft, NT, ND, (+)BM  :  voiding without difficulties  Extremities:  No LE edema bilaterally, +DP, No calf tenderness       HM2 LVAD: No flow alarms overnight   Speed 8800  Flow 4.2   PI 4.5       LABS  07-10    133<L>  |  98  |  20  ----------------------------<  86  3.7   |  22  |  1.00    Ca    8.3<L>      10 Jul 2020 06:18    TPro  6.8  /  Alb  3.8  /  TBili  0.5  /  DBili  x   /  AST  18  /  ALT  17  /  AlkPhos  47  07-10                                 7.9    6.78  )-----------( 170      ( 10 Jul 2020 06:18 )             25.2          PT/INR - ( 10 Jul 2020 06:18 )   PT: 27.7 sec;   INR: 2.54 ratio         PTT - ( 10 Jul 2020 06:18 )  PTT:32.5 sec       PAST MEDICAL & SURGICAL HISTORY:  History of 2019 novel coronavirus disease (COVID-19): 2020  Pleural effusion  Depression  CAD (coronary artery disease)  CHF (congestive heart failure)  S/P ventricular assist device  S/P TVR (tricuspid valve repair)

## 2020-07-10 NOTE — CONSULT NOTE ADULT - PROBLEM SELECTOR RECOMMENDATION 9
- continue current speed @ 8800   - Holding coumadin at this time for GIB. Will continue to hold for EGD and colonoscopy. If needed will can start heparin gtt, currently holding off as INR 2.51  - Holding ASA 81 mg at this time for recent GIB - Readmitted for H/H 5.1/17. Transfused 2uPRBC on 7/9  - Fecal Occult positive 7/9  - GI consulted, appreciate recommendations  - Currently on full liquid diet, plan to switch to CLD tomorrow. Will undergo EGD and Colonoscopy on Monday 7/13

## 2020-07-10 NOTE — PROGRESS NOTE ADULT - ASSESSMENT
65 y/o male PMH ACC/AHA stage D HF due to NICM HM2 LVAD , TV annuloplasty ring 9/12/17 as destination therapy due to severe peripheral artery disease with significant stenosis,  SIADH, Depression, CKD-3 with hyperkalemia, past E. coli UTIs and recently hospitalized in April due to COVID-19. Patient was readmitted for H/H 5.1/17 and had positive fecal occult with no active GI bleeding. He was transfused with 2 units PRBC on 7/9. H/H now stable at 7.9/25. Patient denies weakness, hematochezia, dizziness, SOB, CP, or confusion. Hemodynamically he remains stable and no LVAD alarms noted.        7/10 VSS; GI consulted for Guaiac+, Hemodynamically stable, H&H 7.9/25, no active GI bleeding. Pt denies dizziness, abdominal pain, weakness, or bloody stools. Placed on clear liquid diet. Plan for colonoscopy/endoscopy on Monday. Coumadin for LVAD held today with last INR 2.54. K 3.7 supplemented.

## 2020-07-10 NOTE — CONSULT NOTE ADULT - PROBLEM SELECTOR RECOMMENDATION 2
- Readmitted for H/H 5.1/17. Transfused 2uPRBC on 7/9  - Fecal Occult positive 7/9  - GI consulted, appreciate recommendations  - Currently on fluid liquid diet, plan to switch to CLD come tomorrow. Will undergo EGD and Colonoscopy on Monday 7/13 - Readmitted for H/H 5.1/17. Transfused 2uPRBC on 7/9  - Fecal Occult positive 7/9  - GI consulted, appreciate recommendations  - Currently on full liquid diet, plan to switch to CLD tomorrow. Will undergo EGD and Colonoscopy on Monday 7/13 - continue current speed @ 8800   - Holding coumadin at this time for GIB with supratherapeutic INR. Will continue to hold for EGD and colonoscopy.  - Holding ASA 81 mg at this time for recent GIB

## 2020-07-10 NOTE — CONSULT NOTE ADULT - SUBJECTIVE AND OBJECTIVE BOX
History of Present Illness:  64 y/o male PMH ACC/AHA stage D HF due to NICM HM2 LVAD , TV annuloplasty ring 9/12/17 as destination therapy due to severe peripheral artery disease with significant stenosis  SIADH, Depression, CKD-3 with hyperkalemia, past E. coli UTIs and recently hospitalized in April due to COVID-19. He overall was progressing well, however recently developed worsening lower back pain. Patient was to be seen in clinic today, however when he had morning labs drawn was noted to be hypotensive. He was then transferred to [facility] ED for further management. STAT labs were collected and showed H/H 5.1/17 with elevated INR 3.7. Denies any changes in his stool, no dizziness, n/v/f/c/d, SOB, or abdominal pain.    Past Medical History  History of 2019 novel coronavirus disease (COVID-19): april 2020  Pleural effusion  Depression  CAD (coronary artery disease)  CHF (congestive heart failure)  No pertinent past medical history      Past Surgical History  S/P ventricular assist device  S/P TVR (tricuspid valve repair)  No significant past surgical history      MEDICATIONS  (STANDING):  aMIOdarone    Tablet 200 milliGRAM(s) Oral daily  famotidine    Tablet 20 milliGRAM(s) Oral daily  finasteride 5 milliGRAM(s) Oral daily  lisinopril 5 milliGRAM(s) Oral daily  metoprolol succinate ER 25 milliGRAM(s) Oral daily  mirtazapine 7.5 milliGRAM(s) Oral at bedtime  potassium chloride    Tablet ER 30 milliEquivalent(s) Oral once  sodium chloride 0.9% lock flush 3 milliLiter(s) IV Push every 8 hours    MEDICATIONS  (PRN):  acetaminophen   Tablet .. 650 milliGRAM(s) Oral every 6 hours PRN Temp greater or equal to 38C (100.4F), Moderate Pain (4 - 6)    Antiplatelet therapy: ASA 81 mg for LVAD                           Allergies: No Known Allergies      SOCIAL HISTORY:  Smoker: [ ] Yes  [X] No         ETOH use: [ ] Yes  [X ] No                Ilicit Drug use:  [ ] Yes  [X ] No      Relevant Family History  FAMILY HISTORY:  No pertinent family history in first degree relatives      Review of Systems  GENERAL:  Denies any Fevers, chills, sweats, fatigue                              NEURO: Denies parathesias, seizures, syncope, confusion                                                                                  EYES: Denies glasses, blurry vision, discharge, pain, glaucoma                                                                            ENMT:  Denies difficulty hearing, vertigo, dysphagia, epistaxis, recent dental work                                      CV:  Denies chest pain, palpitations, PEÑA, diaphoresis, edema                                                                                           RESPIRATORY:  Denies wheezing, SOB, cough                                                                GI:  Denies nausea, vomiting , diarrhea, constipation, melena                                                                        : Denies hematuria, dysuria, urgency, incontinence                                                                                         MUSKULOSKELETAL:  States to be having lower back pain that makes it difficult to walk, Denies arthritis or joint swelling                                                              SKIN/BREAST:  rash, itching,  hair loss, masses                                                                                             PSYCH:  No dementia ,depresion, anxiety                                                                                                                HEME/LYMPH:  No bruises easily. enlarged lymph nodes, tender lymph nodes                                              ENDOCRINE:  cold intolerance, heat intolerance, polydipsia                                                                          Vital Signs Last 24 Hrs  T(C): 36.9 (10 Jul 2020 09:32), Max: 37.4 (09 Jul 2020 12:29)  T(F): 98.4 (10 Jul 2020 09:32), Max: 99.3 (09 Jul 2020 12:29)  HR: 70 (10 Jul 2020 09:32) (60 - 87)  BP: 94/66 (10 Jul 2020 09:32) (94/66 - 105/71)  BP(mean): 75 (10 Jul 2020 09:32) (75 - 96)  RR: 18 (10 Jul 2020 09:32) (16 - 20)  SpO2: 100% (10 Jul 2020 09:32) (100% - 100%)    Physical Exam   General: No distress. Comfortable.  HEENT: EOM intact.  Neck: Neck supple. JVP not elevated. No masses  Chest: Clear to auscultation bilaterally  CV: +VAD hum   Abdomen: Soft, non-distended, non-tender  Skin: No rashes or skin breakdown  Neurology: Alert and oriented times three. Sensation intact  Psych: Affect normal                                                          LABS:                        7.9    6.78  )-----------( 170      ( 10 Jul 2020 06:18 )             25.2     07-10    133<L>  |  98  |  20  ----------------------------<  86  3.7   |  22  |  1.00    Ca    8.3<L>      10 Jul 2020 06:18    TPro  6.8  /  Alb  3.8  /  TBili  0.5  /  DBili  x   /  AST  18  /  ALT  17  /  AlkPhos  47  07-10    PT/INR - ( 10 Jul 2020 06:18 )   PT: 27.7 sec;   INR: 2.54 ratio         PTT - ( 10 Jul 2020 06:18 )  PTT:32.5 sec  Urinalysis Basic - ( 09 Jul 2020 14:35 )    Color: Light Yellow / Appearance: Clear / SG: >1.050 / pH: x  Gluc: x / Ketone: Negative  / Bili: Negative / Urobili: Negative   Blood: x / Protein: Trace / Nitrite: Negative   Leuk Esterase: Negative / RBC: x / WBC x   Sq Epi: x / Non Sq Epi: x / Bacteria: x    Imaging     < from: CT Angio Chest w/ IV Cont (07.09.20 @ 13:07) >  IMPRESSION:   1.  No thoracic or abdominal aortic dissection.  2.  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  < end of copied text >      < from: Xray Chest 1 View- PORTABLE-Urgent (07.09.20 @ 11:51) >  IMPRESSION:  No active pulmonary disease.  Thank you for the courtesy of this referral.  < end of copied text > History of Present Illness:  66 y/o male PMH ACC/AHA stage D HF due to NICM HM2 LVAD , TV annuloplasty ring 9/12/17 as destination therapy due to severe peripheral artery disease with significant stenosis  SIADH, Depression, CKD-3 with hyperkalemia, past E. coli UTIs and recently hospitalized in April due to COVID-19. He overall was progressing well, however recently developed worsening lower back pain. Patient was to be seen in clinic today, however when he had morning labs drawn was noted to be hypotensive. He was then transferred to [facility] ED for further management. STAT labs were collected and showed H/H 5.1/17 with elevated INR 3.7. Denies any changes in his stool, no dizziness, n/v/f/c/d, SOB, or abdominal pain.    Past Medical History  History of 2019 novel coronavirus disease (COVID-19): april 2020  Pleural effusion  Depression  CAD (coronary artery disease)  CHF (congestive heart failure)  No pertinent past medical history      Past Surgical History  S/P ventricular assist device  S/P TVR (tricuspid valve repair)  No significant past surgical history      MEDICATIONS  (STANDING):  aMIOdarone    Tablet 200 milliGRAM(s) Oral daily  famotidine    Tablet 20 milliGRAM(s) Oral daily  finasteride 5 milliGRAM(s) Oral daily  lisinopril 5 milliGRAM(s) Oral daily  metoprolol succinate ER 25 milliGRAM(s) Oral daily  mirtazapine 7.5 milliGRAM(s) Oral at bedtime  potassium chloride    Tablet ER 30 milliEquivalent(s) Oral once  sodium chloride 0.9% lock flush 3 milliLiter(s) IV Push every 8 hours    MEDICATIONS  (PRN):  acetaminophen   Tablet .. 650 milliGRAM(s) Oral every 6 hours PRN Temp greater or equal to 38C (100.4F), Moderate Pain (4 - 6)    Antiplatelet therapy: ASA 81 mg for LVAD                           Allergies: No Known Allergies      SOCIAL HISTORY:  Smoker: [ ] Yes  [X] No         ETOH use: [ ] Yes  [X ] No                Ilicit Drug use:  [ ] Yes  [X ] No      Relevant Family History  FAMILY HISTORY:  No pertinent family history in first degree relatives      Review of Systems  GENERAL:  Denies any Fevers, chills, sweats, fatigue                              NEURO: Denies parathesias, seizures, syncope, confusion                                                                                  EYES: Denies glasses, blurry vision, discharge, pain, glaucoma                                                                            ENMT:  Denies difficulty hearing, vertigo, dysphagia, epistaxis, recent dental work                                      CV:  Denies chest pain, palpitations, PEÑA, diaphoresis, edema                                                                                           RESPIRATORY:  Denies wheezing, SOB, cough                                                                GI:  Denies nausea, vomiting , diarrhea, constipation, melena                                                                        : Denies hematuria, dysuria, urgency, incontinence                                                                                         MUSKULOSKELETAL:  States to be having lower back pain that makes it difficult to walk, Denies arthritis or joint swelling                                                              SKIN/BREAST:  rash, itching,  hair loss, masses                                                                                             PSYCH:  No dementia ,depresion, anxiety                                                                                                                HEME/LYMPH:  No bruises easily. enlarged lymph nodes, tender lymph nodes                                              ENDOCRINE:  cold intolerance, heat intolerance, polydipsia                                                                          Vital Signs Last 24 Hrs  T(C): 36.9 (10 Jul 2020 09:32), Max: 37.4 (09 Jul 2020 12:29)  T(F): 98.4 (10 Jul 2020 09:32), Max: 99.3 (09 Jul 2020 12:29)  HR: 70 (10 Jul 2020 09:32) (60 - 87)  BP: 94/66 (10 Jul 2020 09:32) (94/66 - 105/71)  BP(mean): 75 (10 Jul 2020 09:32) (75 - 96)  RR: 18 (10 Jul 2020 09:32) (16 - 20)  SpO2: 100% (10 Jul 2020 09:32) (100% - 100%)    Physical Exam   General: No distress. Comfortable.  HEENT: EOM intact.  Neck: Neck supple. JVP not elevated. No masses  Chest: Clear to auscultation bilaterally  CV: +VAD hum   Abdomen: Soft, non-distended, non-tender  Skin: No rashes or skin breakdown  Neurology: Alert and oriented times three. Sensation intact  Psych: Affect normal  Driveline exit site: Clean, dry, and intact    LVAD Interrogation: Heart Mate II  RPMs: 8800  Power: 5  Flow: 5.1  PI: 6.1  Event: No significant events.   No programming changes were made                                                       LABS:                        7.9    6.78  )-----------( 170      ( 10 Jul 2020 06:18 )             25.2     07-10    133<L>  |  98  |  20  ----------------------------<  86  3.7   |  22  |  1.00    Ca    8.3<L>      10 Jul 2020 06:18    TPro  6.8  /  Alb  3.8  /  TBili  0.5  /  DBili  x   /  AST  18  /  ALT  17  /  AlkPhos  47  07-10    PT/INR - ( 10 Jul 2020 06:18 )   PT: 27.7 sec;   INR: 2.54 ratio         PTT - ( 10 Jul 2020 06:18 )  PTT:32.5 sec  Urinalysis Basic - ( 09 Jul 2020 14:35 )    Color: Light Yellow / Appearance: Clear / SG: >1.050 / pH: x  Gluc: x / Ketone: Negative  / Bili: Negative / Urobili: Negative   Blood: x / Protein: Trace / Nitrite: Negative   Leuk Esterase: Negative / RBC: x / WBC x   Sq Epi: x / Non Sq Epi: x / Bacteria: x    Imaging     < from: CT Angio Chest w/ IV Cont (07.09.20 @ 13:07) >  IMPRESSION:   1.  No thoracic or abdominal aortic dissection.  2.  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  < end of copied text >      < from: Xray Chest 1 View- PORTABLE-Urgent (07.09.20 @ 11:51) >  IMPRESSION:  No active pulmonary disease.  Thank you for the courtesy of this referral.  < end of copied text >

## 2020-07-10 NOTE — CONSULT NOTE ADULT - PROBLEM SELECTOR RECOMMENDATION 3
- Continue home Lisinopril 5 mg PO and Metoprolol XL 25 mg PO daily   - Continue Amio 200 mg PO daily  - ECHO completed, awaiting read

## 2020-07-10 NOTE — CONSULT NOTE ADULT - ASSESSMENT
63 y/o male PMH ACC/AHA stage D HF due to NICM HM2 LVAD , TV annuloplasty ring 9/12/17 as destination therapy due to severe peripheral artery disease with significant stenosis,  SIADH, Depression, CKD-3 with hyperkalemia, past E. coli UTIs and recently hospitalized in April due to COVID-19. Patient was readmitted for H/H 5.1/17 and had positive fecal occult. He was transfused with 2 units PRBC on 7/9 with appropriate response. GI consulted and will undergo EGD and Coloscopy on Monday. Hemodynamically he remains stable and no LVAD alarms noted. 63 y/o male PMH ACC/AHA stage D HF due to NICM HM2 LVAD , TV annuloplasty ring 9/12/17 as destination therapy due to severe peripheral artery disease with significant stenosis,  SIADH, Depression, CKD-3 with hyperkalemia, past E. coli UTIs and recently hospitalized in April due to COVID-19. Patient was readmitted for H/H 5.1/17 and had positive fecal occult. He was transfused with 2 units PRBC on 7/9 with appropriate response. GI consulted and will undergo EGD and Coloscopy on Monday. Hemodynamically remains stable and no LVAD alarms noted.

## 2020-07-10 NOTE — PROGRESS NOTE ADULT - PROBLEM SELECTOR PLAN 1
S/P HM2 for NICM and S/P TV Ring in 2017  Speed 8800, Flow 4.2, PI 4.5   Continue on Toprol 25XL daily   Anticoagulated with Coumadin, currently held for anemia   Last INR 2.54  LDH normal (208)   HF following

## 2020-07-10 NOTE — CONSULT NOTE ADULT - ATTENDING COMMENTS
65 yo M pmh CHF 2/2 NICM s/p LVAD on a/c (destination therapy), severe PAD, ckd3, who presents for acute anemia in setting of elevated INR.  Has never had any endoscopic workup and had ct colon prior to LVAD.  As OP was beginning to feel weaker and more fatigued.  Found to have anemia to 5s now s/p 2 units with prbc.  Never looks at his own stool, so unclear if any blood.  Brown stool on rectal.  Doing okay currently.  Given h/o no endoscopy - will start with EGD/Colonoscopy on Monday for evaluation.  If all negative, will need VCE.  A/c as per primary team with goal INR < 2.0 prior to procedures on Monday.
Continue to hold antiplatelets and warfarin. Clinically stable. Will plan for EGD and colo on Monday.     Plan discussed in multidisciplinary round with [facility] NP team and CT surgery.

## 2020-07-10 NOTE — PROGRESS NOTE ADULT - PROBLEM SELECTOR PLAN 2
Readmit for anemia and elev INR --> S/P @ PRBC x 2 Units for H/H 5.1/17  +Fecal occult stool with no active GI bleeding  H&H stable 7.9/25, Patient asymptomatic, Hemodynamically stable   GI consulted and following   Plan for Colonoscopy and Endoscopy on Monday 7/13   Patient currently placed on clear liquid diet

## 2020-07-10 NOTE — CONSULT NOTE ADULT - SUBJECTIVE AND OBJECTIVE BOX
Chief Complaint:  Patient is a 65y old  Male who presents with a chief complaint of Anemia (2020 12:41)    HPI:  Mr. Quispe is a 63 yo male with PMH of AHA stage D HF 2/2 NICM with HM2 LVAD (on coumadin) as destination therapy due to severe PAD, Depression, CKD-3 with hyperkalemia who presents with weakness, back and LE pain.    GI consulted for anemia.    Patient was in his USOH until 5 days prior to presentation when he reports worsening bilateral leg and back pain associated with lightheadedness, fatigue and SOB on ambulation that resolves with rest. He does not look at his stools so cannot comment on color or consistency but reports no change in frequency of 1 BM per day. No n/v. No change in skin color, no rash or bleeding noticed elsewhere. Denies fever, chills, chest pain. No previous colonoscopy or endoscopy.  On day of admission patient was to be seen as outpatient, however pre-visit labs with Hb 5.1 and INR 3.6, and he was sent to the ED.    In the ED:  ======  BP 90/64, HR 90  Received 2uPRBC  CTA c, a & p done    Allergies:  No Known Allergies      Home Medications:  · 	Coumadin 5 mg oral tablet: 1 tab(s) orally once a day Goal INR 2-3 followed by Hakan MORIN [id number]  · 	acetaminophen 325 mg oral tablet: 2 tab(s) orally every 6 hours, As needed, Temp greater or equal to 38C (100.4F), Moderate Pain (4 - 6)  · 	metoprolol succinate 25 mg oral tablet, extended release: 1 tab(s) orally once a day  · 	amiodarone 200 mg oral tablet: 1 tab(s) orally once a day   · 	polyethylene glycol 3350 oral powder for reconstitution: 17 gram(s) orally once a day  · 	famotidine 20 mg oral tablet: 1 tab(s) orally once a day  · 	guaiFENesin 100 mg/5 mL oral liquid: 5 milliliter(s) orally every 6 hours, As needed, Cough  · 	aspirin 81 mg oral delayed release tablet: 1 tab(s) orally once a day  · 	mirtazapine 7.5 mg oral tablet: 1 tab(s) orally once a day (at bedtime)  · 	finasteride 5 mg oral tablet: 1 tab(s) orally once a day    Hospital Medications:  acetaminophen   Tablet .. 650 milliGRAM(s) Oral every 6 hours PRN  aMIOdarone    Tablet 200 milliGRAM(s) Oral daily  famotidine    Tablet 20 milliGRAM(s) Oral daily  finasteride 5 milliGRAM(s) Oral daily  lisinopril 5 milliGRAM(s) Oral daily  metoprolol succinate ER 25 milliGRAM(s) Oral daily  mirtazapine 7.5 milliGRAM(s) Oral at bedtime  potassium chloride    Tablet ER 30 milliEquivalent(s) Oral once  sodium chloride 0.9% lock flush 3 milliLiter(s) IV Push every 8 hours      PMHX/PSHX:  History of 2019 novel coronavirus disease (COVID-19)  Pleural effusion  Depression  CAD (coronary artery disease)  CHF (congestive heart failure)  No pertinent past medical history  S/P ventricular assist device  S/P TVR (tricuspid valve repair)  No significant past surgical history      Family history:  No pertinent family history in first degree relatives      Denies family history of colon cancer/polyps, stomach cancer/polyps, pancreatic cancer/masses, liver cancer/disease, ovarian cancer and endometrial cancer.    Social History:     Tob: Denies  EtOH: Denies  Illicit Drugs: Denies    ROS:   General:  No wt loss, fevers, chills, night sweats, fatigue  Eyes:  Good vision, no reported pain  ENT:  No sore throat, pain, runny nose, dysphagia  CV:  No pain, palpitations, hypo/hypertension  Pulm:  No dyspnea, cough, tachypnea, wheezing  GI:  No pain, No nausea, No vomiting, No diarrhea, No constipation, No weight loss, No fever, No pruritis, No rectal bleeding, No tarry stools, No dysphagia,  :  No pain, bleeding, incontinence, nocturia  Muscle:  No pain, weakness  Neuro:  No weakness, tingling, memory problems  Psych:  +fatigue, insomnia, mood problems, depression  Endocrine:  No polyuria, polydipsia, cold/heat intolerance  Heme:  No petechiae, ecchymosis, easy bruisability  Skin:  No rash, tattoos, scars, edema    PHYSICAL EXAM:     GENERAL:  No acute distress  HEENT:  Normocephalic/atraumatic, no scleral icterus  CHEST:  Clear to auscultation bilaterally, no wheezes/rales/ronchi, no accessory muscle use  HEART:  LVAD placed, mid sternal surgical scar, RRR  ABDOMEN:  Soft, non-tender, non-distended, normoactive bowel sounds,  no masses, no hepato-splenomegaly, no signs of chronic liver disease. TELOL with normal rectal tone, normal looking stool on glove  EXTREMITIES: No cyanosis, clubbing, or edema  SKIN:  No rash/erythema/ecchymoses/petechiae/wounds/abscess/warm/dry  NEURO:  Alert and oriented x 3, no asterixis    Vital Signs:  Vital Signs Last 24 Hrs  T(C): 36.9 (10 Jul 2020 09:32), Max: 37.4 (2020 12:29)  T(F): 98.4 (10 Jul 2020 09:32), Max: 99.3 (2020 12:29)  HR: 70 (10 Jul 2020 09:32) (60 - 89)  BP: 94/66 (10 Jul 2020 09:32) (91/64 - 105/71)  BP(mean): 75 (10 Jul 2020 09:32) (75 - 96)  RR: 18 (10 Jul 2020 09:32) (16 - 20)  SpO2: 100% (10 Jul 2020 09:32) (98% - 100%)  Daily     Daily Weight in k.7 (10 Jul 2020 07:34)    LABS:  Hb initially 5.1 MCV 93.5                      7.9    6.78  )-----------( 170      ( 10 Jul 2020 06:18 )             25.2     Mean Cell Volume: 92.3 fl (07-10-20 @ 06:18)    07-10    133<L>  |  98  |  20  ----------------------------<  86  3.7   |  22  |  1.00    Ca    8.3<L>      10 Jul 2020 06:18    TPro  6.8  /  Alb  3.8  /  TBili  0.5  /  DBili  x   /  AST  18  /  ALT  17  /  AlkPhos  47  07-10        LIVER FUNCTIONS - ( 10 Jul 2020 06:18 )  Alb: 3.8 g/dL / Pro: 6.8 g/dL / ALK PHOS: 47 U/L / ALT: 17 U/L / AST: 18 U/L / GGT: x           PT/INR - ( 10 Jul 2020 06:18 )   PT: 27.7 sec;   INR: 2.54 ratio         PTT - ( 10 Jul 2020 06:18 )  PTT:32.5 sec  Urinalysis Basic - ( 2020 14:35 )    Color: Light Yellow / Appearance: Clear / SG: >1.050 / pH: x  Gluc: x / Ketone: Negative  / Bili: Negative / Urobili: Negative   Blood: x / Protein: Trace / Nitrite: Negative   Leuk Esterase: Negative / RBC: x / WBC x   Sq Epi: x / Non Sq Epi: x / Bacteria: x               7.9    6.78  )-----------( 170      ( 10 Jul 2020 06:18 )             25.2                         8.0    7.47  )-----------( 182      ( 2020 23:35 )             25.2                         5.1    6.14  )-----------( 206      ( 2020 11:49 )             17.2       Imaging:  CTA c, a & p  1.  No thoracic or abdominal aortic dissection.  2.  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 Chief Complaint:  Patient is a 65y old  Male who presents with a chief complaint of Anemia (2020 12:41)    HPI:  Mr. Quispe is a 65 yo male with PMH of AHA stage D HF 2/2 NICM with HM2 LVAD (on coumadin) as destination therapy due to severe PAD, Depression, CKD-3 with hyperkalemia who presents with weakness, back and LE pain.    GI consulted for anemia.    Patient was in his USOH until 5 days prior to presentation when he reports worsening bilateral leg and back pain associated with lightheadedness, fatigue and SOB on ambulation that resolves with rest. He does not look at his stools so cannot comment on color or consistency but reports no change in frequency of 1 BM per day. No n/v. No change in skin color, no rash or bleeding noticed elsewhere. Denies fever, chills, chest pain. No previous colonoscopy or endoscopy. Normal CT colonoscopy .  On day of admission patient was to be seen as outpatient, however pre-visit labs with Hb 5.1 and INR 3.6, and he was sent to the ED.    In the ED:  ======  BP 90/64, HR 90  Received 2uPRBC  CTA c, a & p done    Allergies:  No Known Allergies      Home Medications:  · 	Coumadin 5 mg oral tablet: 1 tab(s) orally once a day Goal INR 2-3 followed by Hakan MORIN [id number]  · 	acetaminophen 325 mg oral tablet: 2 tab(s) orally every 6 hours, As needed, Temp greater or equal to 38C (100.4F), Moderate Pain (4 - 6)  · 	metoprolol succinate 25 mg oral tablet, extended release: 1 tab(s) orally once a day  · 	amiodarone 200 mg oral tablet: 1 tab(s) orally once a day   · 	polyethylene glycol 3350 oral powder for reconstitution: 17 gram(s) orally once a day  · 	famotidine 20 mg oral tablet: 1 tab(s) orally once a day  · 	guaiFENesin 100 mg/5 mL oral liquid: 5 milliliter(s) orally every 6 hours, As needed, Cough  · 	aspirin 81 mg oral delayed release tablet: 1 tab(s) orally once a day  · 	mirtazapine 7.5 mg oral tablet: 1 tab(s) orally once a day (at bedtime)  · 	finasteride 5 mg oral tablet: 1 tab(s) orally once a day    Hospital Medications:  acetaminophen   Tablet .. 650 milliGRAM(s) Oral every 6 hours PRN  aMIOdarone    Tablet 200 milliGRAM(s) Oral daily  famotidine    Tablet 20 milliGRAM(s) Oral daily  finasteride 5 milliGRAM(s) Oral daily  lisinopril 5 milliGRAM(s) Oral daily  metoprolol succinate ER 25 milliGRAM(s) Oral daily  mirtazapine 7.5 milliGRAM(s) Oral at bedtime  potassium chloride    Tablet ER 30 milliEquivalent(s) Oral once  sodium chloride 0.9% lock flush 3 milliLiter(s) IV Push every 8 hours      PMHX/PSHX:  History of 2019 novel coronavirus disease (COVID-19)  Pleural effusion  Depression  CAD (coronary artery disease)  CHF (congestive heart failure)  No pertinent past medical history  S/P ventricular assist device  S/P TVR (tricuspid valve repair)  No significant past surgical history      Family history:  No pertinent family history in first degree relatives      Denies family history of colon cancer/polyps, stomach cancer/polyps, pancreatic cancer/masses, liver cancer/disease, ovarian cancer and endometrial cancer.    Social History:     Tob: Denies  EtOH: Denies  Illicit Drugs: Denies    ROS:   General:  No wt loss, fevers, chills, night sweats, fatigue  Eyes:  Good vision, no reported pain  ENT:  No sore throat, pain, runny nose, dysphagia  CV:  No pain, palpitations, hypo/hypertension  Pulm:  No dyspnea, cough, tachypnea, wheezing  GI:  No pain, No nausea, No vomiting, No diarrhea, No constipation, No weight loss, No fever, No pruritis, No rectal bleeding, No tarry stools, No dysphagia,  :  No pain, bleeding, incontinence, nocturia  Muscle:  No pain, weakness  Neuro:  No weakness, tingling, memory problems  Psych:  +fatigue, insomnia, mood problems, depression  Endocrine:  No polyuria, polydipsia, cold/heat intolerance  Heme:  No petechiae, ecchymosis, easy bruisability  Skin:  No rash, tattoos, scars, edema    PHYSICAL EXAM:     GENERAL:  No acute distress  HEENT:  Normocephalic/atraumatic, no scleral icterus  CHEST:  Clear to auscultation bilaterally, no wheezes/rales/ronchi, no accessory muscle use  HEART:  LVAD placed, mid sternal surgical scar, RRR  ABDOMEN:  Soft, non-tender, non-distended, normoactive bowel sounds,  no masses, no hepato-splenomegaly, no signs of chronic liver disease. TELLO with normal rectal tone, normal looking stool on glove  EXTREMITIES: No cyanosis, clubbing, or edema  SKIN:  No rash/erythema/ecchymoses/petechiae/wounds/abscess/warm/dry  NEURO:  Alert and oriented x 3, no asterixis    Vital Signs:  Vital Signs Last 24 Hrs  T(C): 36.9 (10 Jul 2020 09:32), Max: 37.4 (2020 12:29)  T(F): 98.4 (10 Jul 2020 09:32), Max: 99.3 (2020 12:29)  HR: 70 (10 Jul 2020 09:32) (60 - 89)  BP: 94/66 (10 Jul 2020 09:32) (91/64 - 105/71)  BP(mean): 75 (10 Jul 2020 09:32) (75 - 96)  RR: 18 (10 Jul 2020 09:32) (16 - 20)  SpO2: 100% (10 Jul 2020 09:32) (98% - 100%)  Daily     Daily Weight in k.7 (10 Jul 2020 07:34)    LABS:  Hb initially 5.1 MCV 93.5                      7.9    6.78  )-----------( 170      ( 10 Jul 2020 06:18 )             25.2     Mean Cell Volume: 92.3 fl (07-10-20 @ 06:18)    07-10    133<L>  |  98  |  20  ----------------------------<  86  3.7   |  22  |  1.00    Ca    8.3<L>      10 Jul 2020 06:18    TPro  6.8  /  Alb  3.8  /  TBili  0.5  /  DBili  x   /  AST  18  /  ALT  17  /  AlkPhos  47  07-10        LIVER FUNCTIONS - ( 10 Jul 2020 06:18 )  Alb: 3.8 g/dL / Pro: 6.8 g/dL / ALK PHOS: 47 U/L / ALT: 17 U/L / AST: 18 U/L / GGT: x           PT/INR - ( 10 Jul 2020 06:18 )   PT: 27.7 sec;   INR: 2.54 ratio         PTT - ( 10 Jul 2020 06:18 )  PTT:32.5 sec  Urinalysis Basic - ( 2020 14:35 )    Color: Light Yellow / Appearance: Clear / SG: >1.050 / pH: x  Gluc: x / Ketone: Negative  / Bili: Negative / Urobili: Negative   Blood: x / Protein: Trace / Nitrite: Negative   Leuk Esterase: Negative / RBC: x / WBC x   Sq Epi: x / Non Sq Epi: x / Bacteria: x               7.9    6.78  )-----------( 170      ( 10 Jul 2020 06:18 )             25.2                         8.0    7.47  )-----------( 182      ( 2020 23:35 )             25.2                         5.1    6.14  )-----------( 206      ( 2020 11:49 )             17.2       Imaging:  CTA c, a & p  1.  No thoracic or abdominal aortic dissection.  2.  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

## 2020-07-11 DIAGNOSIS — I47.2 VENTRICULAR TACHYCARDIA: ICD-10-CM

## 2020-07-11 LAB
ALBUMIN SERPL ELPH-MCNC: 4 G/DL — SIGNIFICANT CHANGE UP (ref 3.3–5)
ALP SERPL-CCNC: 55 U/L — SIGNIFICANT CHANGE UP (ref 40–120)
ALT FLD-CCNC: 14 U/L — SIGNIFICANT CHANGE UP (ref 10–45)
ANION GAP SERPL CALC-SCNC: 11 MMOL/L — SIGNIFICANT CHANGE UP (ref 5–17)
AST SERPL-CCNC: 18 U/L — SIGNIFICANT CHANGE UP (ref 10–40)
BILIRUB SERPL-MCNC: 0.4 MG/DL — SIGNIFICANT CHANGE UP (ref 0.2–1.2)
BUN SERPL-MCNC: 14 MG/DL — SIGNIFICANT CHANGE UP (ref 7–23)
CALCIUM SERPL-MCNC: 8.8 MG/DL — SIGNIFICANT CHANGE UP (ref 8.4–10.5)
CHLORIDE SERPL-SCNC: 98 MMOL/L — SIGNIFICANT CHANGE UP (ref 96–108)
CO2 SERPL-SCNC: 24 MMOL/L — SIGNIFICANT CHANGE UP (ref 22–31)
CREAT SERPL-MCNC: 0.98 MG/DL — SIGNIFICANT CHANGE UP (ref 0.5–1.3)
GLUCOSE SERPL-MCNC: 97 MG/DL — SIGNIFICANT CHANGE UP (ref 70–99)
HCT VFR BLD CALC: 27 % — LOW (ref 39–50)
HGB BLD-MCNC: 8.4 G/DL — LOW (ref 13–17)
INR BLD: 2.36 RATIO — HIGH (ref 0.88–1.16)
LDH SERPL L TO P-CCNC: 207 U/L — SIGNIFICANT CHANGE UP (ref 50–242)
MCHC RBC-ENTMCNC: 28.1 PG — SIGNIFICANT CHANGE UP (ref 27–34)
MCHC RBC-ENTMCNC: 31.1 GM/DL — LOW (ref 32–36)
MCV RBC AUTO: 90.3 FL — SIGNIFICANT CHANGE UP (ref 80–100)
NRBC # BLD: 0 /100 WBCS — SIGNIFICANT CHANGE UP (ref 0–0)
PLATELET # BLD AUTO: 187 K/UL — SIGNIFICANT CHANGE UP (ref 150–400)
POTASSIUM SERPL-MCNC: 4.1 MMOL/L — SIGNIFICANT CHANGE UP (ref 3.5–5.3)
POTASSIUM SERPL-SCNC: 4.1 MMOL/L — SIGNIFICANT CHANGE UP (ref 3.5–5.3)
PROT SERPL-MCNC: 6.9 G/DL — SIGNIFICANT CHANGE UP (ref 6–8.3)
PROTHROM AB SERPL-ACNC: 26.1 SEC — HIGH (ref 10.6–13.6)
RBC # BLD: 2.99 M/UL — LOW (ref 4.2–5.8)
RBC # FLD: 15.1 % — HIGH (ref 10.3–14.5)
SODIUM SERPL-SCNC: 133 MMOL/L — LOW (ref 135–145)
WBC # BLD: 7.6 K/UL — SIGNIFICANT CHANGE UP (ref 3.8–10.5)
WBC # FLD AUTO: 7.6 K/UL — SIGNIFICANT CHANGE UP (ref 3.8–10.5)

## 2020-07-11 PROCEDURE — 99233 SBSQ HOSP IP/OBS HIGH 50: CPT | Mod: GC

## 2020-07-11 PROCEDURE — 93750 INTERROGATION VAD IN PERSON: CPT

## 2020-07-11 PROCEDURE — 99232 SBSQ HOSP IP/OBS MODERATE 35: CPT

## 2020-07-11 RX ADMIN — MIRTAZAPINE 7.5 MILLIGRAM(S): 45 TABLET, ORALLY DISINTEGRATING ORAL at 22:01

## 2020-07-11 RX ADMIN — SODIUM CHLORIDE 3 MILLILITER(S): 9 INJECTION INTRAMUSCULAR; INTRAVENOUS; SUBCUTANEOUS at 22:00

## 2020-07-11 RX ADMIN — LISINOPRIL 5 MILLIGRAM(S): 2.5 TABLET ORAL at 05:21

## 2020-07-11 RX ADMIN — AMIODARONE HYDROCHLORIDE 200 MILLIGRAM(S): 400 TABLET ORAL at 05:21

## 2020-07-11 RX ADMIN — SODIUM CHLORIDE 3 MILLILITER(S): 9 INJECTION INTRAMUSCULAR; INTRAVENOUS; SUBCUTANEOUS at 05:21

## 2020-07-11 RX ADMIN — Medication 25 MILLIGRAM(S): at 05:21

## 2020-07-11 RX ADMIN — PANTOPRAZOLE SODIUM 40 MILLIGRAM(S): 20 TABLET, DELAYED RELEASE ORAL at 05:21

## 2020-07-11 RX ADMIN — FINASTERIDE 5 MILLIGRAM(S): 5 TABLET, FILM COATED ORAL at 12:05

## 2020-07-11 RX ADMIN — SODIUM CHLORIDE 3 MILLILITER(S): 9 INJECTION INTRAMUSCULAR; INTRAVENOUS; SUBCUTANEOUS at 13:41

## 2020-07-11 NOTE — PROGRESS NOTE ADULT - PROBLEM SELECTOR PLAN 2
7/11 NPO after midnight for EGD & colonoscopy in am 7/12   Readmit for anemia and elev INR --> S/P @ PRBC x 2 Units for H/H 5.1/17  +Fecal occult stool with no active GI bleeding  H&H stable 7.9/25, Patient asymptomatic, Hemodynamically stable   GI consulted and following   Plan for Colonoscopy and Endoscopy on Monday 7/13   Patient currently placed on clear liquid diet EGD & colonoscopy in am 7/12   Readmit for anemia and elev INR --> S/P @ PRBC x 2 Units for H/H 5.1/17  +Fecal occult stool with no active GI bleeding  H&H stable 7.9/25, Patient asymptomatic, Hemodynamically stable   GI consulted and following   Plan for Colonoscopy and Endoscopy on Monday 7/13   Patient currently placed on clear liquid diet

## 2020-07-11 NOTE — PROGRESS NOTE ADULT - ASSESSMENT
65 y/o male PMH ACC/AHA stage D HF due to NICM HM2 LVAD , TV annuloplasty ring 9/12/17 as destination therapy due to severe peripheral artery disease with significant stenosis,  SIADH, Depression, CKD-3 with hyperkalemia, past E. coli UTIs and recently hospitalized in April due to COVID-19. Patient was readmitted for H/H 5.1/17 and had positive fecal occult with no active GI bleeding. He was transfused with 2 units PRBC on 7/9. H/H now stable at 7.9/25. Patient denies weakness, hematochezia, dizziness, SOB, CP, or confusion. Hemodynamically he remains stable and no LVAD alarms noted.        7/10 VSS; GI consulted for Guaiac+, Hemodynamically stable, H&H 7.9/25, no active GI bleeding. Pt denies dizziness, abdominal pain, weakness, or bloody stools. Placed on clear liquid diet. Plan for colonoscopy/endoscopy on Monday. Coumadin for LVAD held today with last INR 2.54. K 3.7 supplemented.   7/11 VSS NPO for EGD & colonscopy in am 7/12 Monday- bowel prep today. 65 y/o male PMH ACC/AHA stage D HF due to NICM HM2 LVAD , TV annuloplasty ring 9/12/17 as destination therapy due to severe peripheral artery disease with significant stenosis,  SIADH, Depression, CKD-3 with hyperkalemia, past E. coli UTIs and recently hospitalized in April due to COVID-19. Patient was readmitted for H/H 5.1/17 and had positive fecal occult with no active GI bleeding. He was transfused with 2 units PRBC on 7/9. H/H now stable at 7.9/25. Patient denies weakness, hematochezia, dizziness, SOB, CP, or confusion. Hemodynamically he remains stable and no LVAD alarms noted.        7/10 VSS; GI consulted for Guaiac+, Hemodynamically stable, H&H 7.9/25, no active GI bleeding. Pt denies dizziness, abdominal pain, weakness, or bloody stools. Placed on clear liquid diet. Plan for colonoscopy/endoscopy on Monday. Coumadin for LVAD held today with last INR 2.54. K 3.7 supplemented.   7/11 VSS EGD & colonscopy in am 7/12 Monday- bowel prep tomorrow

## 2020-07-11 NOTE — PROGRESS NOTE ADULT - ASSESSMENT
65 y/o male PMH ACC/AHA stage D HF due to NICM HM2 LVAD , TV annuloplasty ring 9/12/17 as destination therapy due to severe peripheral artery disease with significant stenosis,  SIADH, Depression, CKD-3 with hyperkalemia, past E. coli UTIs and recently hospitalized in April due to COVID-19. Patient was readmitted for H/H 5.1/17 and had positive fecal occult. He was transfused with 2 units PRBC on 7/9 with appropriate response. GI consulted and will undergo EGD and Coloscopy on Monday. Hemodynamically remains stable and no LVAD alarms noted.        Plan discussed in multidisciplinary round with [facility] NP team and CT surgery.

## 2020-07-11 NOTE — PROGRESS NOTE ADULT - SUBJECTIVE AND OBJECTIVE BOX
VITAL SIGNS-Telemetry:  SR 83  Vital Signs Last 24 Hrs  T(C): 36.7 (20 @ 05:20), Max: 36.9 (07-10-20 @ 09:32)  T(F): 98.1 (20 @ 05:20), Max: 98.4 (07-10-20 @ 09:32)  HR: 76 (20 @ 05:20) (68 - 78)  BP: 89/64 (07-10-20 @ 16:00) (89/64 - 94/66)  RR: 18 (07-10-20 @ 16:00) (18 - 18)  SpO2: 100% (07-10-20 @ 16:00) (100% - 100%)         07-10 @ 07:  -   @ 07:00  --------------------------------------------------------  IN: 900 mL / OUT: 2050 mL / NET: -1150 mL     @ 07:01  -   @ 09:16  --------------------------------------------------------  IN: 420 mL / OUT: 0 mL / NET: 420 mL    Daily     Daily Weight in k.5 (2020 07:25)    Coumadin    [x ] YES          [  ]      NO         Reason:     lvad  PHYSICAL EXAM:  Neurology: alert and oriented x 3, nonfocal, no gross deficits  CV : +VAD hum  Sternal Wound : Healed  Lungs: CTA  Abdomen: soft, nontender, nondistended, positive bowel sounds, last bowel movement     3 days ago    Extremities:     NO edema no calf tenderness     acetaminophen   Tablet .. 650 milliGRAM(s) Oral every 6 hours PRN  aMIOdarone    Tablet 200 milliGRAM(s) Oral daily  finasteride 5 milliGRAM(s) Oral daily  lisinopril 5 milliGRAM(s) Oral daily  metoprolol succinate ER 25 milliGRAM(s) Oral daily  mirtazapine 7.5 milliGRAM(s) Oral at bedtime  pantoprazole    Tablet 40 milliGRAM(s) Oral before breakfast  sodium chloride 0.9% lock flush 3 milliLiter(s) IV Push every 8 hours    Physical Therapy Rec:   Home  [ x ]   Home w/ PT  [  ]  Rehab  [  ]  Discussed with Cardiothoracic Team at AM rounds.

## 2020-07-11 NOTE — PROGRESS NOTE ADULT - SUBJECTIVE AND OBJECTIVE BOX
Subjective: No overnight events. No current complaints.     Medications:  acetaminophen   Tablet .. 650 milliGRAM(s) Oral every 6 hours PRN  aMIOdarone    Tablet 200 milliGRAM(s) Oral daily  finasteride 5 milliGRAM(s) Oral daily  lisinopril 5 milliGRAM(s) Oral daily  metoprolol succinate ER 25 milliGRAM(s) Oral daily  mirtazapine 7.5 milliGRAM(s) Oral at bedtime  pantoprazole    Tablet 40 milliGRAM(s) Oral before breakfast  sodium chloride 0.9% lock flush 3 milliLiter(s) IV Push every 8 hours      Physical Exam:    Vital Signs Last 24 Hrs  T(C): 36.9 (2020 10:14), Max: 36.9 (2020 10:14)  T(F): 98.4 (2020 10:14), Max: 98.4 (2020 10:14)  HR: 66 (2020 10:14) (66 - 78)  BP: 89/64 (10 Jul 2020 16:00) (89/64 - 89/64)  BP(mean): 78 (2020 10:14) (72 - 92)  RR: 18 (10 Jul 2020 16:00) (18 - 18)  SpO2: 100% (10 Jul 2020 16:00) (100% - 100%)    Daily     Daily Weight in k.5 (2020 07:25)    I&O's Summary    10 Jul 2020 07:  -  2020 07:00  --------------------------------------------------------  IN: 900 mL / OUT: 2050 mL / NET: -1150 mL    2020 07:  -  2020 12:05  --------------------------------------------------------  IN: 420 mL / OUT: 0 mL / NET: 420 mL      General: No distress. Comfortable.  HEENT: EOM intact.  Neck: Neck supple. JVP not elevated. No masses  Chest: Clear to auscultation bilaterally  CV: Typical LVAD sounds. No distal pulses. No edema.   Abdomen: Soft, non-distended, non-tender  Driveline exit site: Clean, dry, and intact  Skin: No rashes or skin breakdown  Neurology: Alert and oriented times three. Sensation intact  Psych: Affect normal    LVAD Interrogation: Heart Mate II  RPMs: 8800  Power: 4.8  Flow: 4.6  PI: 6.7  Event: None  No programming changes were made    Labs:                        8.4    7.60  )-----------( 187      ( 2020 06:42 )             27.0         133<L>  |  98  |  14  ----------------------------<  97  4.1   |  24  |  0.98    Ca    8.8      2020 06:42    TPro  6.9  /  Alb  4.0  /  TBili  0.4  /  DBili  x   /  AST  18  /  ALT  14  /  AlkPhos  55      PT/INR - ( 2020 06:43 )   PT: 26.1 sec;   INR: 2.36 ratio    PTT - ( 2020 06:43 )  PTT:31.6 sec    Lactate Dehydrogenase, Serum: 207 U/L ( @ 06:42)  Lactate Dehydrogenase, Serum: 208 U/L (07-10 @ 06:18)  Lactate Dehydrogenase, Serum: 233 U/L ( @ 11:49)

## 2020-07-12 LAB
ANION GAP SERPL CALC-SCNC: 12 MMOL/L — SIGNIFICANT CHANGE UP (ref 5–17)
APTT BLD: 35.2 SEC — SIGNIFICANT CHANGE UP (ref 27.5–35.5)
BUN SERPL-MCNC: 12 MG/DL — SIGNIFICANT CHANGE UP (ref 7–23)
CALCIUM SERPL-MCNC: 9 MG/DL — SIGNIFICANT CHANGE UP (ref 8.4–10.5)
CHLORIDE SERPL-SCNC: 95 MMOL/L — LOW (ref 96–108)
CO2 SERPL-SCNC: 25 MMOL/L — SIGNIFICANT CHANGE UP (ref 22–31)
CREAT SERPL-MCNC: 1.02 MG/DL — SIGNIFICANT CHANGE UP (ref 0.5–1.3)
GLUCOSE SERPL-MCNC: 104 MG/DL — HIGH (ref 70–99)
HCT VFR BLD CALC: 29.1 % — LOW (ref 39–50)
HGB BLD-MCNC: 9 G/DL — LOW (ref 13–17)
INR BLD: 1.93 RATIO — HIGH (ref 0.88–1.16)
LDH SERPL L TO P-CCNC: 215 U/L — SIGNIFICANT CHANGE UP (ref 50–242)
MCHC RBC-ENTMCNC: 28.2 PG — SIGNIFICANT CHANGE UP (ref 27–34)
MCHC RBC-ENTMCNC: 30.9 GM/DL — LOW (ref 32–36)
MCV RBC AUTO: 91.2 FL — SIGNIFICANT CHANGE UP (ref 80–100)
NRBC # BLD: 0 /100 WBCS — SIGNIFICANT CHANGE UP (ref 0–0)
PLATELET # BLD AUTO: 218 K/UL — SIGNIFICANT CHANGE UP (ref 150–400)
POTASSIUM SERPL-MCNC: 3.9 MMOL/L — SIGNIFICANT CHANGE UP (ref 3.5–5.3)
POTASSIUM SERPL-SCNC: 3.9 MMOL/L — SIGNIFICANT CHANGE UP (ref 3.5–5.3)
PROTHROM AB SERPL-ACNC: 21.3 SEC — HIGH (ref 10.6–13.6)
RBC # BLD: 3.19 M/UL — LOW (ref 4.2–5.8)
RBC # FLD: 14.8 % — HIGH (ref 10.3–14.5)
SODIUM SERPL-SCNC: 132 MMOL/L — LOW (ref 135–145)
WBC # BLD: 6.52 K/UL — SIGNIFICANT CHANGE UP (ref 3.8–10.5)
WBC # FLD AUTO: 6.52 K/UL — SIGNIFICANT CHANGE UP (ref 3.8–10.5)

## 2020-07-12 PROCEDURE — 99232 SBSQ HOSP IP/OBS MODERATE 35: CPT

## 2020-07-12 PROCEDURE — 93750 INTERROGATION VAD IN PERSON: CPT

## 2020-07-12 PROCEDURE — 99233 SBSQ HOSP IP/OBS HIGH 50: CPT | Mod: 25

## 2020-07-12 PROCEDURE — 99232 SBSQ HOSP IP/OBS MODERATE 35: CPT | Mod: GC

## 2020-07-12 RX ORDER — SOD SULF/SODIUM/NAHCO3/KCL/PEG
4000 SOLUTION, RECONSTITUTED, ORAL ORAL ONCE
Refills: 0 | Status: COMPLETED | OUTPATIENT
Start: 2020-07-12 | End: 2020-07-12

## 2020-07-12 RX ADMIN — Medication 4000 MILLILITER(S): at 17:56

## 2020-07-12 NOTE — PROGRESS NOTE ADULT - ASSESSMENT
Mr. Quispe is a 65 yo male with PMH of AHA stage D HF 2/2 NICM with HM2 LVAD (on coumadin) as destination therapy due to severe PAD, Depression, CKD-3 with hyperkalemia who presents with weakness, back and LE pain, found to have severe anemia.    # Anemia  # LVAD on coumadin  # PAD    Patient has anemia with no clear etiology. Hemodynamically stable with no chalino GIB noted. Unlikely to be hemolytic anemia given normal LDH and bilirubin. Given LVAD, patient is predisposed to AVMs as potential cause for the anemia. Will plan for EGD + Colonoscopy on Monday, +/- video capsule if needed.    Recommendations:  - Plan for EGD/Colon +/- VCE tomorrow  - Clear liquid diet today  - NPO after midnight  - Bowel prep ordered by GI fellow  - Rest of care per primary team    Tae Calderon MD  Gastroenterology Fellow  Please contact via [employer] Teams    Please call [phone] to speak to answering service for on-call GI fellow. Can also e-mail [email or web address].

## 2020-07-12 NOTE — PROVIDER CONTACT NOTE (OTHER) - RECOMMENDATIONS
Rationale/benefits of taking prep as ordered explained to patient several times. NP made aware & rounded with patient at beside.

## 2020-07-12 NOTE — PROGRESS NOTE ADULT - SUBJECTIVE AND OBJECTIVE BOX
Subjective: No current complaints.     Medications:  acetaminophen   Tablet .. 650 milliGRAM(s) Oral every 6 hours PRN  aMIOdarone    Tablet 200 milliGRAM(s) Oral daily  finasteride 5 milliGRAM(s) Oral daily  lisinopril 5 milliGRAM(s) Oral daily  metoprolol succinate ER 25 milliGRAM(s) Oral daily  mirtazapine 7.5 milliGRAM(s) Oral at bedtime  pantoprazole    Tablet 40 milliGRAM(s) Oral before breakfast  polyethylene glycol/electrolyte Solution. 4000 milliLiter(s) Oral once  sodium chloride 0.9% lock flush 3 milliLiter(s) IV Push every 8 hours      Physical Exam:    Vital Signs Last 24 Hrs  T(C): 36.6 (2020 09:44), Max: 36.8 (2020 22:00)  T(F): 97.9 (2020 09:44), Max: 98.2 (2020 22:00)  HR: 80 (2020 11:11) (63 - 82)  BP(mean): 66 (2020 11:11) (56 - 86)  RR: 18 (2020 09:44) (18 - 18)  SpO2: 96% (2020 09:44) (96% - 100%)    Daily Weight in k (2020 08:02)    I&O's Summary    2020 07:01  -  2020 07:00  --------------------------------------------------------  IN: 1080 mL / OUT: 1200 mL / NET: -120 mL    2020 07:01  -  2020 12:18  --------------------------------------------------------  IN: 600 mL / OUT: 0 mL / NET: 600 mL    General: No distress. Comfortable.  HEENT: EOM intact.  Neck: Neck supple. JVP not elevated. No masses  Chest: Clear to auscultation bilaterally  CV: Typical LVAD sounds. No distal pulses  Abdomen: Soft, non-distended, non-tender  Driveline exit site: Clean, dry, and intact  Skin: No rashes or skin breakdown  Neurology: Alert and oriented times three. Sensation intact  Psych: Affect normal    LVAD Interrogation: Heart Mate 2  RPMs: 8800  Power: 4.6  Flow: 4.4  PI: 6.5  Event: 1 low flow alarm.   No programming changes were made    Labs:                        9.0    6.52  )-----------( 218      ( 2020 06:06 )             29.1     07-    132<L>  |  95<L>  |  12  ----------------------------<  104<H>  3.9   |  25  |  1.02    Ca    9.0      2020 06:06    TPro  6.9  /  Alb  4.0  /  TBili  0.4  /  DBili  x   /  AST  18  /  ALT  14  /  AlkPhos  55  07-11    PT/INR - ( 2020 06:07 )   PT: 21.3 sec;   INR: 1.93 ratio    PTT - ( 2020 06:07 )  PTT:35.2 sec    Lactate Dehydrogenase, Serum: 215 U/L ( @ 06:06)  Lactate Dehydrogenase, Serum: 207 U/L ( @ 06:42)  Lactate Dehydrogenase, Serum: 208 U/L (07-10 @ 06:18)

## 2020-07-12 NOTE — PROVIDER CONTACT NOTE (OTHER) - BACKGROUND
7/9 Admit: anemia/elevated INR. 2 units PRBC's. Scheduled for GI scops tomorrow  HX: HM2 LVAD, CHF, COVID 4/2020.

## 2020-07-12 NOTE — PROVIDER CONTACT NOTE (OTHER) - SITUATION
Pt started [employer] GI prep at 1800. Pt was educated by previous RN & at start of this shift about how to consume prep and in what time frame. Pt states he is unwilling and unable to finish prep.

## 2020-07-12 NOTE — PROGRESS NOTE ADULT - ASSESSMENT
65 y/o male PMH ACC/AHA stage D HF due to NICM HM2 LVAD , TV annuloplasty ring 9/12/17 as destination therapy due to severe peripheral artery disease with significant stenosis,  SIADH, Depression, CKD-3 with hyperkalemia, past E. coli UTIs and recently hospitalized in April due to COVID-19. Patient was readmitted for H/H 5.1/17 and had positive fecal occult. He was transfused with 2 units PRBC on 7/9 with appropriate response.     GI consulted and will undergo EGD and Coloscopy on Monday. Hemodynamically remains stable, however, he had an epidsode of NSVT, which was prolonged and associated with a low flow alarm.     Plan discussed in multidisciplinary round with [facility] NP team and CT surgery.

## 2020-07-12 NOTE — PROGRESS NOTE ADULT - ASSESSMENT
65 y/o male PMH ACC/AHA stage D HF due to NICM HM2 LVAD , TV annuloplasty ring 9/12/17 as destination therapy due to severe peripheral artery disease with significant stenosis,  SIADH, Depression, CKD-3 with hyperkalemia, past E. coli UTIs and recently hospitalized in April due to COVID-19. Patient was readmitted for H/H 5.1/17 and had positive fecal occult with no active GI bleeding. He was transfused with 2 units PRBC on 7/9. H/H now stable at 7.9/25. Patient denies weakness, hematochezia, dizziness, SOB, CP, or confusion. Hemodynamically he remains stable and no LVAD alarms noted.        7/10 VSS; GI consulted for Guaiac+, Hemodynamically stable, H&H 7.9/25, no active GI bleeding. Pt denies dizziness, abdominal pain, weakness, or bloody stools. Placed on clear liquid diet. Plan for colonoscopy/endoscopy on Monday. Coumadin for LVAD held today with last INR 2.54. K 3.7 supplemented.   7/11 VSS EGD & colonscopy in am 7/12 Monday- bowel prep tomorrow   7/12 VSS NPO for EGD/colonoscopy in am - coumadin on hold

## 2020-07-12 NOTE — PROGRESS NOTE ADULT - PROBLEM SELECTOR PLAN 2
EGD & colonoscopy in am 7/12   Readmit for anemia and elev INR --> S/P @ PRBC x 2 Units for H/H 5.1/17  +Fecal occult stool with no active GI bleeding  H&H stable 7.9/25, Patient asymptomatic, Hemodynamically stable   GI consulted and following   Plan for Colonoscopy and Endoscopy on Monday 7/13   Patient currently placed on clear liquid diet

## 2020-07-12 NOTE — PROGRESS NOTE ADULT - SUBJECTIVE AND OBJECTIVE BOX
Chief Complaint: Weakness  Reason for consult: Anemia    Interval Events:   - Patient is frustrated that he cannot eat, but otherwise has not had any bowel movements. No bloody stools or black tarry stools.    Allergies:  No Known Allergies    Hospital Medications:  acetaminophen   Tablet .. 650 milliGRAM(s) Oral every 6 hours PRN  aMIOdarone    Tablet 200 milliGRAM(s) Oral daily  finasteride 5 milliGRAM(s) Oral daily  lisinopril 5 milliGRAM(s) Oral daily  metoprolol succinate ER 25 milliGRAM(s) Oral daily  mirtazapine 7.5 milliGRAM(s) Oral at bedtime  pantoprazole    Tablet 40 milliGRAM(s) Oral before breakfast  polyethylene glycol/electrolyte Solution. 4000 milliLiter(s) Oral once  sodium chloride 0.9% lock flush 3 milliLiter(s) IV Push every 8 hours    PMHX/PSHX:  History of 2019 novel coronavirus disease (COVID-19)  Pleural effusion  Depression  CAD (coronary artery disease)  CHF (congestive heart failure)  No pertinent past medical history  S/P ventricular assist device  S/P TVR (tricuspid valve repair)  No significant past surgical history    Family history:  No pertinent family history in first degree relatives    ROS:     General:  No wt loss, fevers, chills, night sweats, + fatigue  Eyes:  Good vision, no reported pain  ENT:  No sore throat, pain, runny nose, dysphagia  CV:  No pain, palpitations, hypo/hypertension  Pulm:  No dyspnea, cough, tachypnea, wheezing  GI:  No pain, No nausea, No vomiting, No diarrhea, No constipation, No weight loss, No fever, No pruritis, No rectal bleeding, No tarry stools, No dysphagia  :  No pain, bleeding, incontinence, nocturia  Muscle:  No pain, weakness  Neuro:  No weakness, tingling, memory problems  Psych:  No fatigue, insomnia, mood problems, depression  Endocrine:  No polyuria, polydipsia, cold/heat intolerance  Heme:  No petechiae, ecchymosis, easy bruisability  Skin:  No rash, tattoos, scars, edema    PHYSICAL EXAM:   Vital Signs:  Vital Signs Last 24 Hrs  T(C): 36.7 (2020 06:00), Max: 36.9 (2020 10:14)  T(F): 98.1 (2020 06:00), Max: 98.4 (2020 10:14)  HR: 81 (2020 06:00) (63 - 82)  BP: --  BP(mean): 86 (2020 06:00) (76 - 86)  RR: 18 (2020 06:00) (18 - 18)  SpO2: 100% (2020 06:00) (98% - 100%)  Daily Weight in k (2020 08:02)    GENERAL:  No acute distress  HEENT:  Normocephalic/atraumatic,  no scleral icterus  CHEST:  Normal effort, no accessory muscle use  HEART:  +LVAD hum  ABDOMEN:  Soft, non-tender, non-distended, normoactive bowel sounds, drive-line site c/d/i  EXTREMITIES:  No cyanosis, clubbing, or edema  SKIN:  No rash/erythema  NEURO:  Alert and oriented x 3    LABS:                        9.0    6.52  )-----------( 218      ( 2020 06:06 )             29.1     Mean Cell Volume: 91.2 fl (20 @ 06:06)    07-12    132<L>  |  95<L>  |  12  ----------------------------<  104<H>  3.9   |  25  |  1.02    Ca    9.0      2020 06:06    TPro  6.9  /  Alb  4.0  /  TBili  0.4  /  DBili  x   /  AST  18  /  ALT  14  /  AlkPhos  55  07-11    LIVER FUNCTIONS - ( 2020 06:42 )  Alb: 4.0 g/dL / Pro: 6.9 g/dL / ALK PHOS: 55 U/L / ALT: 14 U/L / AST: 18 U/L / GGT: x           PT/INR - ( 2020 06:07 )   PT: 21.3 sec;   INR: 1.93 ratio      PTT - ( 2020 06:07 )  PTT:35.2 sec               9.0    6.52  )-----------( 218      ( 2020 06:06 )             29.1                         8.4    7.60  )-----------( 187      ( 2020 06:42 )             27.0                         7.9    6.78  )-----------( 170      ( 10 Jul 2020 06:18 )             25.2                         8.0    7.47  )-----------( 182      ( 2020 23:35 )             25.2                         5.1    6.14  )-----------( 206      ( 2020 11:49 )             17.2     Imaging:    Reviewed

## 2020-07-12 NOTE — PROVIDER CONTACT NOTE (OTHER) - SITUATION
Pt had 12 beats WCT on tele(asymptomatic). Pt has had ectopy before with several episodes on day shift.

## 2020-07-12 NOTE — PROGRESS NOTE ADULT - SUBJECTIVE AND OBJECTIVE BOX
VITAL SIGNS-Telemetry:  SR 76  Vital Signs Last 24 Hrs  T(C): 36.7 (20 @ 06:00), Max: 36.9 (20 @ 10:14)  T(F): 98.1 (20 @ 06:00), Max: 98.4 (20 @ 10:14)  HR: 81 (20 @ 06:00) (63 - 82)  BP: --  RR: 18 (20 @ 06:00) (18 - 18)  SpO2: 100% (20 @ 06:00) (98% - 100%)          @ 07:01  -   @ 07:00  --------------------------------------------------------  IN: 1080 mL / OUT: 1200 mL / NET: -120 mL    Daily     Daily Weight in k (2020 08:02)    Coumadin    [ ] YES          [ x ]      NO         Reason:    on hold   PHYSICAL EXAM:  Neurology: alert and oriented x 3, nonfocal, no gross deficits  CV : + VAD hum  Lungs:CTA  Abdomen: soft, nontender, nondistended, positive bowel sounds, last bowel movement   +bm     -LVAD driveline site CDi  Extremities:     no edema , no calf tenderness    acetaminophen   Tablet .. 650 milliGRAM(s) Oral every 6 hours PRN  aMIOdarone    Tablet 200 milliGRAM(s) Oral daily  finasteride 5 milliGRAM(s) Oral daily  lisinopril 5 milliGRAM(s) Oral daily  metoprolol succinate ER 25 milliGRAM(s) Oral daily  mirtazapine 7.5 milliGRAM(s) Oral at bedtime  pantoprazole    Tablet 40 milliGRAM(s) Oral before breakfast  polyethylene glycol/electrolyte Solution. 4000 milliLiter(s) Oral once  sodium chloride 0.9% lock flush 3 milliLiter(s) IV Push every 8 hours      Physical Therapy Rec:   Home  [ x ]   Home w/ PT  [  ]  Rehab  [  ]  Discussed with Cardiothoracic Team at AM rounds.

## 2020-07-13 LAB
ANION GAP SERPL CALC-SCNC: 14 MMOL/L — SIGNIFICANT CHANGE UP (ref 5–17)
APTT BLD: 28.8 SEC — SIGNIFICANT CHANGE UP (ref 27.5–35.5)
BUN SERPL-MCNC: 15 MG/DL — SIGNIFICANT CHANGE UP (ref 7–23)
CALCIUM SERPL-MCNC: 8.2 MG/DL — LOW (ref 8.4–10.5)
CHLORIDE SERPL-SCNC: 97 MMOL/L — SIGNIFICANT CHANGE UP (ref 96–108)
CO2 SERPL-SCNC: 23 MMOL/L — SIGNIFICANT CHANGE UP (ref 22–31)
CREAT SERPL-MCNC: 1.05 MG/DL — SIGNIFICANT CHANGE UP (ref 0.5–1.3)
GLUCOSE SERPL-MCNC: 104 MG/DL — HIGH (ref 70–99)
HCT VFR BLD CALC: 27 % — LOW (ref 39–50)
HGB BLD-MCNC: 8.4 G/DL — LOW (ref 13–17)
INR BLD: 1.78 RATIO — HIGH (ref 0.88–1.16)
MAGNESIUM SERPL-MCNC: 2 MG/DL — SIGNIFICANT CHANGE UP (ref 1.6–2.6)
MCHC RBC-ENTMCNC: 28.6 PG — SIGNIFICANT CHANGE UP (ref 27–34)
MCHC RBC-ENTMCNC: 31.1 GM/DL — LOW (ref 32–36)
MCV RBC AUTO: 91.8 FL — SIGNIFICANT CHANGE UP (ref 80–100)
NRBC # BLD: 0 /100 WBCS — SIGNIFICANT CHANGE UP (ref 0–0)
PLATELET # BLD AUTO: 228 K/UL — SIGNIFICANT CHANGE UP (ref 150–400)
POTASSIUM SERPL-MCNC: 3.7 MMOL/L — SIGNIFICANT CHANGE UP (ref 3.5–5.3)
POTASSIUM SERPL-MCNC: 4.3 MMOL/L — SIGNIFICANT CHANGE UP (ref 3.5–5.3)
POTASSIUM SERPL-SCNC: 3.7 MMOL/L — SIGNIFICANT CHANGE UP (ref 3.5–5.3)
POTASSIUM SERPL-SCNC: 4.3 MMOL/L — SIGNIFICANT CHANGE UP (ref 3.5–5.3)
PROTHROM AB SERPL-ACNC: 19.9 SEC — HIGH (ref 10.6–13.6)
RBC # BLD: 2.94 M/UL — LOW (ref 4.2–5.8)
RBC # FLD: 14.8 % — HIGH (ref 10.3–14.5)
SODIUM SERPL-SCNC: 134 MMOL/L — LOW (ref 135–145)
WBC # BLD: 9.85 K/UL — SIGNIFICANT CHANGE UP (ref 3.8–10.5)
WBC # FLD AUTO: 9.85 K/UL — SIGNIFICANT CHANGE UP (ref 3.8–10.5)

## 2020-07-13 PROCEDURE — 93750 INTERROGATION VAD IN PERSON: CPT

## 2020-07-13 PROCEDURE — 99232 SBSQ HOSP IP/OBS MODERATE 35: CPT

## 2020-07-13 PROCEDURE — 44360 SMALL BOWEL ENDOSCOPY: CPT

## 2020-07-13 PROCEDURE — 45378 DIAGNOSTIC COLONOSCOPY: CPT | Mod: GC

## 2020-07-13 PROCEDURE — 99233 SBSQ HOSP IP/OBS HIGH 50: CPT

## 2020-07-13 RX ORDER — ASPIRIN/CALCIUM CARB/MAGNESIUM 324 MG
81 TABLET ORAL DAILY
Refills: 0 | Status: DISCONTINUED | OUTPATIENT
Start: 2020-07-13 | End: 2020-07-17

## 2020-07-13 RX ORDER — WARFARIN SODIUM 2.5 MG/1
2 TABLET ORAL ONCE
Refills: 0 | Status: COMPLETED | OUTPATIENT
Start: 2020-07-13 | End: 2020-07-13

## 2020-07-13 RX ORDER — POLYETHYLENE GLYCOL 3350 17 G/17G
17 POWDER, FOR SOLUTION ORAL
Refills: 0 | Status: COMPLETED | OUTPATIENT
Start: 2020-07-13 | End: 2020-07-13

## 2020-07-13 RX ORDER — MAGNESIUM SULFATE 500 MG/ML
1 VIAL (ML) INJECTION ONCE
Refills: 0 | Status: COMPLETED | OUTPATIENT
Start: 2020-07-13 | End: 2020-07-13

## 2020-07-13 RX ORDER — SPIRONOLACTONE 25 MG/1
25 TABLET, FILM COATED ORAL DAILY
Refills: 0 | Status: DISCONTINUED | OUTPATIENT
Start: 2020-07-13 | End: 2020-07-17

## 2020-07-13 RX ORDER — POTASSIUM CHLORIDE 20 MEQ
30 PACKET (EA) ORAL ONCE
Refills: 0 | Status: COMPLETED | OUTPATIENT
Start: 2020-07-13 | End: 2020-07-13

## 2020-07-13 RX ADMIN — Medication 100 GRAM(S): at 20:17

## 2020-07-13 RX ADMIN — POLYETHYLENE GLYCOL 3350 17 GRAM(S): 17 POWDER, FOR SOLUTION ORAL at 21:33

## 2020-07-13 RX ADMIN — SPIRONOLACTONE 25 MILLIGRAM(S): 25 TABLET, FILM COATED ORAL at 16:48

## 2020-07-13 RX ADMIN — Medication 81 MILLIGRAM(S): at 16:48

## 2020-07-13 RX ADMIN — POLYETHYLENE GLYCOL 3350 17 GRAM(S): 17 POWDER, FOR SOLUTION ORAL at 20:14

## 2020-07-13 RX ADMIN — Medication 30 MILLIEQUIVALENT(S): at 13:03

## 2020-07-13 RX ADMIN — POLYETHYLENE GLYCOL 3350 17 GRAM(S): 17 POWDER, FOR SOLUTION ORAL at 22:22

## 2020-07-13 RX ADMIN — WARFARIN SODIUM 2 MILLIGRAM(S): 2.5 TABLET ORAL at 22:22

## 2020-07-13 NOTE — PROGRESS NOTE ADULT - SUBJECTIVE AND OBJECTIVE BOX
Pre-Endoscopy Evaluation      Referring Physician: dr. [email or web address]                                Procedure:  upper gastrointestinal endoscopy/colonoscopy    Indication for Procedure: anemia    Pertinent History: 65 yo male with PMH of AHA stage D HF 2/2 NICM with HM2 LVAD (on coumadin) as destination therapy due to severe PAD, Depression, CKD-3 with hyperkalemia who presents with weakness, back and LE pain, found to have severe anemia.    PAST MEDICAL & SURGICAL HISTORY:  History of 2019 novel coronavirus disease (COVID-): 2020  Pleural effusion  Depression  CAD (coronary artery disease)  CHF (congestive heart failure)  S/P ventricular assist device  S/P TVR (tricuspid valve repair)      PMH of Gastroparesis [ ]  Gastric Surgery [ ]  Gastric Outlet Obstruction [ ]    Allergies    No Known Allergies    Intolerances    Latex allergy: [ ] yes [X] no    Medications:MEDICATIONS  (STANDING):  aMIOdarone    Tablet 200 milliGRAM(s) Oral daily  finasteride 5 milliGRAM(s) Oral daily  lisinopril 5 milliGRAM(s) Oral daily  metoprolol succinate ER 25 milliGRAM(s) Oral daily  mirtazapine 7.5 milliGRAM(s) Oral at bedtime  pantoprazole    Tablet 40 milliGRAM(s) Oral before breakfast  potassium chloride    Tablet ER 30 milliEquivalent(s) Oral once  sodium chloride 0.9% lock flush 3 milliLiter(s) IV Push every 8 hours    MEDICATIONS  (PRN):  acetaminophen   Tablet .. 650 milliGRAM(s) Oral every 6 hours PRN Temp greater or equal to 38C (100.4F), Moderate Pain (4 - 6)      Smoking: [ ] yes  [X] no    AICD/PPM: [ ] yes   [X] no    Pertinent lab data:                        8.4    9.85  )-----------( 228      ( 2020 06:58 )             27.0     07-13    134<L>  |  97  |  15  ----------------------------<  104<H>  3.7   |  23  |  1.05    Ca    8.2<L>      2020 06:58      PT/INR - ( 2020 06:58 )   PT: 19.9 sec;   INR: 1.78 ratio      PTT - ( 2020 06:58 )  PTT:28.8 sec      COVID-19 PCR: NotDetec (2020 12:28)      Physical Examination:  Daily     Daily Weight in k.9 (2020 07:31)  Vital Signs Last 24 Hrs  T(C): 36.7 (2020 08:25), Max: 36.7 (2020 01:39)  T(F): 98.1 (2020 08:25), Max: 98.1 (2020 01:39)  HR: 80 (2020 08:25) (74 - 90)  BP: --  BP(mean): 71 (2020 08:25) (56 - 86)  RR: 18 (2020 08:25) (17 - 18)  SpO2: 99% (2020 08:25) (95% - 99%)    Drug Dosing Weight  Height (cm): 154.9 (2020 10:01)  Weight (kg): 75.1 (2020 16:30)  BMI (kg/m2): 31.3 (2020 16:30)  BSA (m2): 1.74 (2020 16:30)      Constitutional: NAD     Neck:  No JVD    Respiratory: CTAB/L    Cardiovascular: S1 and S2    Gastrointestinal: BS+, soft, NT/ND    Extremities: No peripheral edema    Neurological: A/O x 3    Comments:    The patient is a suitable candidate for the planned procedure unless box checked [ ]  No, explain:

## 2020-07-13 NOTE — PROGRESS NOTE ADULT - PROBLEM SELECTOR PLAN 3
- Continue home dose of lisinopril and metoprolol. - Continue home dose of lisinopril and metoprolol.  - Started Spironolactone 25 mg PO daily

## 2020-07-13 NOTE — PROVIDER CONTACT NOTE (OTHER) - ASSESSMENT
Denies chest pain, palpitations, shortness of breath or dizziness.  MAP 11-82
Abd distended.  Pt states he feels "full" and "can't take any more"
Asymptomatic. Gol[facility] prep in progress.
Denies chest pain, shortness of breath, palpitations, lightheadedness.   MAP 56 manually and repeated with same value
Patient currently stable, awaiting transport to Endoscopy. Aranza TAYLOR with patient at bedside  MAP 71 HR 80 pox 99 temp 98.1 resp 18

## 2020-07-13 NOTE — PROGRESS NOTE ADULT - ASSESSMENT
65 y/o male PMH ACC/AHA stage D HF due to NICM HM2 LVAD , TV annuloplasty ring 9/12/17 as destination therapy due to severe peripheral artery disease with significant stenosis,  SIADH, Depression, CKD-3 with hyperkalemia, past E. coli UTIs and recently hospitalized in April due to COVID-19. Patient was readmitted for H/H 5.1/17 and had positive fecal occult with no active GI bleeding. He was transfused with 2 units PRBC on 7/9. H/H now stable at 7.9/25. Patient denies weakness, hematochezia, dizziness, SOB, CP, or confusion. Hemodynamically he remains stable and no LVAD alarms noted.        7/10 VSS; GI consulted for Guaiac+, Hemodynamically stable, H&H 7.9/25, no active GI bleeding. Pt denies dizziness, abdominal pain, weakness, or bloody stools. Placed on clear liquid diet. Plan for colonoscopy/endoscopy on Monday. Coumadin for LVAD held today with last INR 2.54. K 3.7 supplemented.   7/11 VSS EGD & colonscopy in am 7/12 Monday- bowel prep tomorrow   7/12 VSS NPO for EGD/colonoscopy in am - coumadin on hold   7/13 pt npo for EGD/Colonoscopy today

## 2020-07-13 NOTE — PROGRESS NOTE ADULT - PROBLEM SELECTOR PLAN 2
EGD & colonoscopy in am 7/13   Readmit for anemia and elev INR --> S/P @ PRBC x 2 Units for H/H 5.1/17  +Fecal occult stool with no active GI bleeding  H&H stable 7.9/25, Patient asymptomatic, Hemodynamically stable   GI consulted and following   Plan for Colonoscopy and Endoscopy on Monday 7/13   Patient currently placed on clear liquid diet

## 2020-07-13 NOTE — PROVIDER CONTACT NOTE (OTHER) - ACTION/TREATMENT ORDERED:
NP notified and aware. Tele monitored for rhythm changes. Patient monitored for s/s's
NP aware. No interventions at this time.
NP notified and aware, NS 500cc bolus x 1 as per NP instructions.  Monitor MAP for improvement, patient for signs/symptoms.
Patient to be supplement for potassium 3.7 when he returns from Endoscopy will continue to monitor
No interventions at this time.

## 2020-07-13 NOTE — PROVIDER CONTACT NOTE (OTHER) - BACKGROUND
63 y/o male PMH ACC/AHA stage D HF due to NICM HM2 LVAD , TV annuloplasty ring 9/12/17 as destination therapy due to severe peripheral artery disease. Admitted on 7/9/20 for Anemia

## 2020-07-13 NOTE — PROGRESS NOTE ADULT - PROBLEM SELECTOR PLAN 2
- Continue at current speed @ 8800. Noted to have one low flow alarm on 7/12.  - Continue to hold warfarin and aspirin. - Continue at current speed @ 8800. Noted to have one low flow alarm on 7/12.  - Resume ASA 81 mg PO daily  - Will resume Coumadin dosing (INR goal 2-2.5). Will dose Coumadin 2 mg tonight.

## 2020-07-13 NOTE — PROGRESS NOTE ADULT - PROBLEM SELECTOR PLAN 1
- Currently stable.   - Underwent EGD and colonoscopy 7/13, no signs of bleeding however noted to have dark stool. Will undergo capsule study tomorrow

## 2020-07-13 NOTE — PROGRESS NOTE ADULT - SUBJECTIVE AND OBJECTIVE BOX
VITAL SIGNS-Telemetry:  SR 93  Vital Signs Last 24 Hrs  T(C): 36.7 (20 @ 08:25), Max: 36.7 (20 @ 01:39)  T(F): 98.1 (20 @ 08:25), Max: 98.1 (20 @ 01:39)  HR: 80 (20 @ 08:25) (74 - 90)  BP: --  RR: 18 (20 @ 08:25) (17 - 18)  SpO2: 99% (20 @ 08:25) (95% - 99%)          @ :  -   @ 07:00  --------------------------------------------------------  IN: 900 mL / OUT: 1200 mL / NET: -300 mL     @ 07:  -   @ 10:54  --------------------------------------------------------  IN: 0 mL / OUT: 200 mL / NET: -200 mL    Daily     Daily Weight in k.9 (2020 07:31)    Coumadin    [ ] YES          [x  ]      NO         Reason:     on hold d/t possible gib  PHYSICAL EXAM:  Neurology: alert and oriented x 3, nonfocal, no gross deficits  CV : +VAD hum  Lungs: cta  Abdomen: soft, nontender, nondistended, positive bowel sounds, last bowel movement     +bm    Extremities:     no edema no calf tenderness    acetaminophen   Tablet .. 650 milliGRAM(s) Oral every 6 hours PRN  aMIOdarone    Tablet 200 milliGRAM(s) Oral daily  finasteride 5 milliGRAM(s) Oral daily  lisinopril 5 milliGRAM(s) Oral daily  metoprolol succinate ER 25 milliGRAM(s) Oral daily  mirtazapine 7.5 milliGRAM(s) Oral at bedtime  pantoprazole    Tablet 40 milliGRAM(s) Oral before breakfast  potassium chloride    Tablet ER 30 milliEquivalent(s) Oral once  sodium chloride 0.9% lock flush 3 milliLiter(s) IV Push every 8 hours    Physical Therapy Rec:   Home  [ x ]   Home w/ PT  [  ]  Rehab  [  ]  Discussed with Cardiothoracic Team at AM rounds.

## 2020-07-13 NOTE — PROGRESS NOTE ADULT - SUBJECTIVE AND OBJECTIVE BOX
Subjective: Patient seen and examined resting in bed. ON remained NPO for EGD/ Colonoscopy today. Today patient overall feels well and has no complaints.     Medications:  acetaminophen   Tablet .. 650 milliGRAM(s) Oral every 6 hours PRN  aMIOdarone    Tablet 200 milliGRAM(s) Oral daily  finasteride 5 milliGRAM(s) Oral daily  lisinopril 5 milliGRAM(s) Oral daily  metoprolol succinate ER 25 milliGRAM(s) Oral daily  mirtazapine 7.5 milliGRAM(s) Oral at bedtime  pantoprazole    Tablet 40 milliGRAM(s) Oral before breakfast  potassium chloride    Tablet ER 30 milliEquivalent(s) Oral once  sodium chloride 0.9% lock flush 3 milliLiter(s) IV Push every 8 hours      Vitals:  Vital Signs Last 24 Hours  T(C): 36.7 (20 @ 08:25), Max: 36.7 (20 @ 01:39)  HR: 80 (20 @ 08:25) (74 - 90)  BP: --  RR: 18 (20 @ 08:25) (17 - 18)  SpO2: 99% (20 @ 08:25) (95% - 99%)    Weight in k.9 ( @ 07:31)    I&O's Summary    2020 07:  -  2020 07:00  --------------------------------------------------------  IN: 900 mL / OUT: 1200 mL / NET: -300 mL    2020 07:01  -  2020 11:04  --------------------------------------------------------  IN: 0 mL / OUT: 200 mL / NET: -200 mL        Physical Exam   General: No distress. Comfortable.  HEENT: EOM intact.  Neck: Neck supple. JVP not elevated. No masses  Chest: Clear to auscultation bilaterally  CV: +VAD hum  Abdomen: Soft, non-distended, non-tender  Skin: No rashes or skin breakdown  Neurology: Alert and oriented times three. Sensation intact  Psych: Affect normal    LVAD Interrogation  VAD TYPE HM 2  Speed 8800  Flow 4.5  Power 4.7  PI 6.9  Assessment of driveline exit site: driveline dressing c/d/i  Programming changes: no programing changes made     Labs:                        8.4    9.85  )-----------( 228      ( 2020 06:58 )             27.0     07    134<L>  |  97  |  15  ----------------------------<  104<H>  3.7   |  23  |  1.05    Ca    8.2<L>      2020 06:58      PT/INR - ( 2020 06:58 )   PT: 19.9 sec;   INR: 1.78 ratio         PTT - ( 2020 06:58 )  PTT:28.8 sec      Lactate Dehydrogenase, Serum: 215 U/L ( @ 06:06)  Lactate Dehydrogenase, Serum: 207 U/L ( @ 06:42)      Imaging Studies     < from: Upper Endoscopy (20 @ 09:09) >  Impression:          - Normal esophagus.                       - Z-line regular, 38 cm from the incisors.                       - 2 cm hiatus hernia.                       - Normal stomach.                       - Normal examined duodenum.                       - No specimens collected.  Recommendation:      - Perform a colonoscopy today.                                                           Attending Participation:       I personally performed the entire procedure.    < end of copied text >

## 2020-07-14 DIAGNOSIS — D72.829 ELEVATED WHITE BLOOD CELL COUNT, UNSPECIFIED: ICD-10-CM

## 2020-07-14 LAB
ALBUMIN SERPL ELPH-MCNC: 3.7 G/DL — SIGNIFICANT CHANGE UP (ref 3.3–5)
ALP SERPL-CCNC: 59 U/L — SIGNIFICANT CHANGE UP (ref 40–120)
ALT FLD-CCNC: 9 U/L — LOW (ref 10–45)
ANION GAP SERPL CALC-SCNC: 12 MMOL/L — SIGNIFICANT CHANGE UP (ref 5–17)
APPEARANCE UR: CLEAR — SIGNIFICANT CHANGE UP
APTT BLD: 27.3 SEC — LOW (ref 27.5–35.5)
APTT BLD: 30 SEC — SIGNIFICANT CHANGE UP (ref 27.5–35.5)
AST SERPL-CCNC: 15 U/L — SIGNIFICANT CHANGE UP (ref 10–40)
BILIRUB SERPL-MCNC: 0.4 MG/DL — SIGNIFICANT CHANGE UP (ref 0.2–1.2)
BILIRUB UR-MCNC: NEGATIVE — SIGNIFICANT CHANGE UP
BLD GP AB SCN SERPL QL: NEGATIVE — SIGNIFICANT CHANGE UP
BUN SERPL-MCNC: 15 MG/DL — SIGNIFICANT CHANGE UP (ref 7–23)
CALCIUM SERPL-MCNC: 8.5 MG/DL — SIGNIFICANT CHANGE UP (ref 8.4–10.5)
CHLORIDE SERPL-SCNC: 100 MMOL/L — SIGNIFICANT CHANGE UP (ref 96–108)
CO2 SERPL-SCNC: 22 MMOL/L — SIGNIFICANT CHANGE UP (ref 22–31)
COLOR SPEC: SIGNIFICANT CHANGE UP
CREAT SERPL-MCNC: 1.08 MG/DL — SIGNIFICANT CHANGE UP (ref 0.5–1.3)
DIFF PNL FLD: NEGATIVE — SIGNIFICANT CHANGE UP
GLUCOSE SERPL-MCNC: 95 MG/DL — SIGNIFICANT CHANGE UP (ref 70–99)
GLUCOSE UR QL: NEGATIVE — SIGNIFICANT CHANGE UP
HCT VFR BLD CALC: 25.4 % — LOW (ref 39–50)
HCT VFR BLD CALC: 25.8 % — LOW (ref 39–50)
HCT VFR BLD CALC: 25.8 % — LOW (ref 39–50)
HGB BLD-MCNC: 7.7 G/DL — LOW (ref 13–17)
HGB BLD-MCNC: 7.9 G/DL — LOW (ref 13–17)
HGB BLD-MCNC: 8 G/DL — LOW (ref 13–17)
INR BLD: 1.62 RATIO — HIGH (ref 0.88–1.16)
KETONES UR-MCNC: ABNORMAL
LDH SERPL L TO P-CCNC: 201 U/L — SIGNIFICANT CHANGE UP (ref 50–242)
LEUKOCYTE ESTERASE UR-ACNC: NEGATIVE — SIGNIFICANT CHANGE UP
MAGNESIUM SERPL-MCNC: 2.4 MG/DL — SIGNIFICANT CHANGE UP (ref 1.6–2.6)
MCHC RBC-ENTMCNC: 27.8 PG — SIGNIFICANT CHANGE UP (ref 27–34)
MCHC RBC-ENTMCNC: 28.1 PG — SIGNIFICANT CHANGE UP (ref 27–34)
MCHC RBC-ENTMCNC: 28.5 PG — SIGNIFICANT CHANGE UP (ref 27–34)
MCHC RBC-ENTMCNC: 30.3 GM/DL — LOW (ref 32–36)
MCHC RBC-ENTMCNC: 30.6 GM/DL — LOW (ref 32–36)
MCHC RBC-ENTMCNC: 31 GM/DL — LOW (ref 32–36)
MCV RBC AUTO: 91.7 FL — SIGNIFICANT CHANGE UP (ref 80–100)
MCV RBC AUTO: 91.8 FL — SIGNIFICANT CHANGE UP (ref 80–100)
MCV RBC AUTO: 91.8 FL — SIGNIFICANT CHANGE UP (ref 80–100)
NITRITE UR-MCNC: NEGATIVE — SIGNIFICANT CHANGE UP
NRBC # BLD: 0 /100 WBCS — SIGNIFICANT CHANGE UP (ref 0–0)
PH UR: 6.5 — SIGNIFICANT CHANGE UP (ref 5–8)
PLATELET # BLD AUTO: 198 K/UL — SIGNIFICANT CHANGE UP (ref 150–400)
PLATELET # BLD AUTO: 202 K/UL — SIGNIFICANT CHANGE UP (ref 150–400)
PLATELET # BLD AUTO: 207 K/UL — SIGNIFICANT CHANGE UP (ref 150–400)
POTASSIUM SERPL-MCNC: 3.9 MMOL/L — SIGNIFICANT CHANGE UP (ref 3.5–5.3)
POTASSIUM SERPL-SCNC: 3.9 MMOL/L — SIGNIFICANT CHANGE UP (ref 3.5–5.3)
PROT SERPL-MCNC: 6.7 G/DL — SIGNIFICANT CHANGE UP (ref 6–8.3)
PROT UR-MCNC: NEGATIVE — SIGNIFICANT CHANGE UP
PROTHROM AB SERPL-ACNC: 18.8 SEC — HIGH (ref 10.6–13.6)
RBC # BLD: 2.77 M/UL — LOW (ref 4.2–5.8)
RBC # BLD: 2.81 M/UL — LOW (ref 4.2–5.8)
RBC # BLD: 2.81 M/UL — LOW (ref 4.2–5.8)
RBC # FLD: 14.6 % — HIGH (ref 10.3–14.5)
RBC # FLD: 14.6 % — HIGH (ref 10.3–14.5)
RBC # FLD: 14.7 % — HIGH (ref 10.3–14.5)
RH IG SCN BLD-IMP: POSITIVE — SIGNIFICANT CHANGE UP
SODIUM SERPL-SCNC: 134 MMOL/L — LOW (ref 135–145)
SP GR SPEC: 1.02 — SIGNIFICANT CHANGE UP (ref 1.01–1.02)
UROBILINOGEN FLD QL: NEGATIVE — SIGNIFICANT CHANGE UP
WBC # BLD: 11.57 K/UL — HIGH (ref 3.8–10.5)
WBC # BLD: 12.37 K/UL — HIGH (ref 3.8–10.5)
WBC # BLD: 12.42 K/UL — HIGH (ref 3.8–10.5)
WBC # FLD AUTO: 11.57 K/UL — HIGH (ref 3.8–10.5)
WBC # FLD AUTO: 12.37 K/UL — HIGH (ref 3.8–10.5)
WBC # FLD AUTO: 12.42 K/UL — HIGH (ref 3.8–10.5)

## 2020-07-14 PROCEDURE — 93750 INTERROGATION VAD IN PERSON: CPT

## 2020-07-14 PROCEDURE — 99232 SBSQ HOSP IP/OBS MODERATE 35: CPT

## 2020-07-14 PROCEDURE — 99233 SBSQ HOSP IP/OBS HIGH 50: CPT | Mod: 25

## 2020-07-14 RX ORDER — POTASSIUM CHLORIDE 20 MEQ
20 PACKET (EA) ORAL ONCE
Refills: 0 | Status: COMPLETED | OUTPATIENT
Start: 2020-07-14 | End: 2020-07-14

## 2020-07-14 RX ORDER — HEPARIN SODIUM 5000 [USP'U]/ML
400 INJECTION INTRAVENOUS; SUBCUTANEOUS
Qty: 25000 | Refills: 0 | Status: DISCONTINUED | OUTPATIENT
Start: 2020-07-14 | End: 2020-07-15

## 2020-07-14 RX ORDER — WARFARIN SODIUM 2.5 MG/1
2 TABLET ORAL ONCE
Refills: 0 | Status: COMPLETED | OUTPATIENT
Start: 2020-07-14 | End: 2020-07-14

## 2020-07-14 RX ORDER — HEPARIN SODIUM 5000 [USP'U]/ML
400 INJECTION INTRAVENOUS; SUBCUTANEOUS
Qty: 25000 | Refills: 0 | Status: DISCONTINUED | OUTPATIENT
Start: 2020-07-14 | End: 2020-07-14

## 2020-07-14 RX ADMIN — HEPARIN SODIUM 4 UNIT(S)/HR: 5000 INJECTION INTRAVENOUS; SUBCUTANEOUS at 13:02

## 2020-07-14 RX ADMIN — HEPARIN SODIUM 6 UNIT(S)/HR: 5000 INJECTION INTRAVENOUS; SUBCUTANEOUS at 21:14

## 2020-07-14 RX ADMIN — Medication 20 MILLIEQUIVALENT(S): at 12:12

## 2020-07-14 RX ADMIN — Medication 81 MILLIGRAM(S): at 12:13

## 2020-07-14 RX ADMIN — WARFARIN SODIUM 2 MILLIGRAM(S): 2.5 TABLET ORAL at 21:39

## 2020-07-14 RX ADMIN — SPIRONOLACTONE 25 MILLIGRAM(S): 25 TABLET, FILM COATED ORAL at 06:16

## 2020-07-14 NOTE — CHART NOTE - NSCHARTNOTEFT_GEN_A_CORE
Risks of video capsule endoscopy explained, including risk of retained capsule, potentially requiring surgery for removal.  Patient understands and agrees to risks.    Capsule ID:  [id number]    Capsule swallowed at: 10:00 AM     NPO now   Resume Clear liquid diet at: 12 PM   Resume regular consistency diet at: 2 PM      Please keep equipment on patient's body. GI fellow will retrieve equipment in the morning.    NO MRI UNTIL CAPSULE CLEARANCE CONFIRMED. CAPSULE IS NOT MRI COMPATIBLE.

## 2020-07-14 NOTE — PROGRESS NOTE ADULT - PROBLEM SELECTOR PLAN 5
- Noted to have uptrend in WBC, currently 12.42. Repeated CBC and WBC remain elevated  - Her Cultured today, will follow up with results

## 2020-07-14 NOTE — PROGRESS NOTE ADULT - PROBLEM SELECTOR PLAN 2
- Continue at current speed @ 8800. Noted to have one low flow alarm on 7/12.  - Continue ASA 81 mg PO daily  - Placed on heparin gtt (PTT goal 60-80) bridge to Coumadin (INR goal 2-2.5). Will dose Coumadin 2 mg tonight.

## 2020-07-14 NOTE — PROGRESS NOTE ADULT - PROBLEM SELECTOR PROBLEM 4
HISTORY OF PRESENT ILLNESS  Ernesto Jenkins is a 47 y.o. female. ASSESSMENT and PLAN    Ms. Myriam Bullock has history of CAD. She presented back in December 2011 with atypical chest pains but abnormal EKG, and abnormal nuclear scan. Her coronary angiogram revealed ostial 90% LAD stenosis; FFR was performed with intrinsic value of 0.87 and adenosine induced 0.66. She subsequently underwent single vessel LIMA to LAD bypass surgery. Since her bypass surgery, she presented twice with chest pains to the emergency room which were again quite atypical. She had nuclear scans done both instances which were normal.  Back in November 2016, [facility].  Nuclear scan showed ejection fraction of 60% but apical filling defect.  Ultimately, repeat coronary angiography was performed.  It was noted that her HECTOR to LAD was widely patent. Again, in March 2020, she presented with chest pains (shoulder and jaw pain), and abnormal nuclear scan involving the basal anterior wall, with decline in EF to 40%. She underwent coronary angiography which revealed widely patent LIMA to LAD with ostial LAD moderate stenosis. Continue medical therapy was recommended. · CAD:    Clinically stable. · BP:   Well controlled. · HR:    Stable, asymptomatic sinus bradycardia. · CHF:    Currently, there is no evidence of decompensated CHF noted. · Weight:    Her weight today is 273 pounds. Her baseline weight is 250 pounds. Again, lengthy discussion was carried on about the importance of weight control. It appears that she takes in quite a bit of carbohydrates daily. After lengthy discussion of over 25 minutes, she understands to decrease carbohydrate intake especially complex carbohydrates in addition to her simple sugars. This will obviously help her left leg neuropathy. · Cholesterol:   Target LDL <70. Crestor 40. · Anti-platelet:   Remains on ASA. I will see her back in 6 months. Thank you. Encounter Diagnoses   Name Primary?  Coronary artery disease involving native coronary artery of native heart without angina pectoris Yes    Chest pain, unspecified type     Abnormal nuclear stress test     Dyslipidemia      current treatment plan is effective, no change in therapy  lab results and schedule of future lab studies reviewed with patient  reviewed diet, exercise and weight control  cardiovascular risk and specific lipid/LDL goals reviewed  use of aspirin to prevent MI and TIA's discussed      HPI   Today, Ms. Paulette Ha has no complaints of chest pains. Her exercise capacity is limited by her left lower extremity neuropathy. She also has not been able lose significant weight which hampers her activity levels. She denies any worsening dyspnea on exertion. She denies any orthopnea or PND. She denies any palpitations or dizziness. Review of Systems   Respiratory: Positive for shortness of breath. Cardiovascular: Negative for chest pain, palpitations, orthopnea, claudication, leg swelling and PND. All other systems reviewed and are negative. Physical Exam  Vitals signs and nursing note reviewed. Constitutional:       Appearance: She is obese. HENT:      Head: Normocephalic. Eyes:      Extraocular Movements: Extraocular movements intact. Conjunctiva/sclera: Conjunctivae normal.   Neck:      Musculoskeletal: No neck rigidity. Cardiovascular:      Rate and Rhythm: Regular rhythm. Bradycardia present. Pulmonary:      Breath sounds: Normal breath sounds. Abdominal:      General: Bowel sounds are normal.      Palpations: Abdomen is soft. Musculoskeletal:         General: No swelling. Skin:     General: Skin is warm and dry. Neurological:      General: No focal deficit present. Mental Status: She is alert and oriented to person, place, and time.    Psychiatric:         Mood and Affect: Mood normal.         Behavior: Behavior normal.         PCP: Roberto Yung NP    Past Medical History:   Diagnosis Date    Abnormal PFT 10/1/2015    Dr. Jeffrey Schroeder, Pulmonology    Acid reflux     CABG x 1 2011    LIMA-LAD    CAD (coronary artery disease)     Cardiac cath 2016    R dom. oLAD 85%. Otherwise patent coronaries. HECTOR to LAD patent. LVEDP 22 mmHg. EF 55%. No RWMA.  Cardiac echocardiogram 2015    Tech difficult. EF 50-55%. No WMA. Mild conc LVH. Gr 2 DDfx. RVSP 45-50 mmHg. Mild LAE. Mild ROSITA. Mod TR. Mild IVCE.  Cardiac nuclear imaging test, high risk 2016    High risk. Mainly fixed anteroapical defect w/partial reversibility. EF 60%. No RWMA. Nondiagnostic EKG on pharm stress test.    Cardiovascular upper extremity arterial duplex 2014    No significant occlusive arterial disease bilaterally.  Coronary atherosclerosis of native coronary artery     Diabetes mellitus ([address] 14    hgbA1C 6.8    Diabetic eye exam ([facility] [address]     Dyslipidemia     GERD (gastroesophageal reflux disease)     H/O screening mammography 2016    no evidence of malignancy    Hypercholesterolemia     Hypomagnesemia 14    1.4    Pleural effusion, left     s/p CABG and thoracentesis with removal of 900 mL    Pulmonary arterial hypertension ([address]        Past Surgical History:   Procedure Laterality Date    CARDIAC CATHETERIZATION  2016         CARDIAC SURG PROCEDURE UNLIST      CORONARY ARTERY ANGIOGRAM  2016         HX  SECTION      HX CORONARY ARTERY BYPASS GRAFT  12/31/2011    x1    LV ANGIOGRAPHY  2016            Current Outpatient Medications   Medication Sig Dispense Refill    magnesium oxide (MAG-OX) 400 mg tablet Take 1 Tab by mouth daily. 90 Tab 0    aspirin delayed-release 81 mg tablet Take 1 Tab by mouth daily. 90 Tab 3    metoprolol tartrate (LOPRESSOR) 25 mg tablet Take 0.5 Tabs by mouth two (2) times a day.  1 Tab 0    gabapentin (NEURONTIN) 300 mg capsule 2 caps QAM, 1 cap at noon, 1 cap at dinner, and 2 caps at HS 1 Cap 0    olmesartan-hydroCHLOROthiazide (BENICAR HCT) 20-12.5 mg per tablet Take 1 Tab by mouth daily. 30 Tab 6    lidocaine (LIDODERM) 5 % APPLY 1 NEW PATCH TO SKIN FOR LEFT DORSAL PAIN UP TO 3 PATCHES EACH TIME Q 12 HOURS PRN.  nitroglycerin (NITROSTAT) 0.4 mg SL tablet DISSOLVE ONE TABLET UNDER THE TONGUE AT THE START OF CHEST PAIN EVERY 5 MINUTES UP TO 3 DOSES      DULoxetine (CYMBALTA) 60 mg capsule Take 1 Cap by mouth daily. 90 Cap 1    multivitamin (ONE A DAY) tablet Take 1 Tab by mouth daily. 30 Tab 11    omega-3 acid ethyl esters (LOVAZA) 1 gram capsule Take 1 Cap by mouth nightly. 90 Cap 3       The patient has a family history of    Social History     Tobacco Use    Smoking status: Never Smoker    Smokeless tobacco: Never Used    Tobacco comment: 2nd home smoking from mother during 1st 17 yrs of life   Substance Use Topics    Alcohol use: No     Alcohol/week: 1.0 standard drinks     Types: 1 Standard drinks or equivalent per week    Drug use: No       Lab Results   Component Value Date/Time    Cholesterol, total 261 (H) 01/28/2020 08:21 AM    HDL Cholesterol 60 01/28/2020 08:21 AM    LDL, calculated 180.6 (H) 01/28/2020 08:21 AM    Triglyceride 102 01/28/2020 08:21 AM    CHOL/HDL Ratio 4.4 01/28/2020 08:21 AM        BP Readings from Last 3 Encounters:   07/14/20 118/64   03/12/20 143/78   03/02/20 140/80        Pulse Readings from Last 3 Encounters:   07/14/20 (!) 58   03/12/20 (!) 54   03/02/20 (!) 59       Wt Readings from Last 3 Encounters:   07/14/20 123.8 kg (273 lb)   03/12/20 123.4 kg (272 lb)   03/02/20 126.6 kg (279 lb)         EKG: normal EKG, normal sinus rhythm, unchanged from previous tracings, nonspecific ST and T waves changes, sinus bradycardia, inferolateral ST depression. Dysuria

## 2020-07-14 NOTE — PROGRESS NOTE ADULT - PROBLEM SELECTOR PLAN 2
Readmit for anemia and elev INR --> S/P @ PRBC x 2 Units for H/H 5.1/17  +Fecal occult stool with no active GI bleeding  EGD & colonoscopy 7/13 NEG  H&H stable 8/25, Patient asymptomatic, Hemodynamically stable   GI consulted and following   Plan NPO for Capsule study today

## 2020-07-14 NOTE — PROGRESS NOTE ADULT - ASSESSMENT
65 y/o male PMH ACC/AHA stage D HF due to NICM HM2 LVAD , TV annuloplasty ring 9/12/17 as destination therapy due to severe peripheral artery disease with significant stenosis,  SIADH, Depression, CKD-3 with hyperkalemia, past E. coli UTIs and recently hospitalized in April due to COVID-19. Patient was readmitted for H/H 5.1/17 and had positive fecal occult with no active GI bleeding. He was transfused with 2 units PRBC on 7/9. H/H now stable at 7.9/25. Patient denies weakness, hematochezia, dizziness, SOB, CP, or confusion. Hemodynamically he remains stable and no LVAD alarms noted.        7/10 VSS; GI consulted for Guaiac+, Hemodynamically stable, H&H 7.9/25, no active GI bleeding. Pt denies dizziness, abdominal pain, weakness, or bloody stools. Placed on clear liquid diet. Plan for colonoscopy/endoscopy on Monday. Coumadin for LVAD held today with last INR 2.54. K 3.7 supplemented.   7/11 VSS EGD & colonscopy in am 7/12 Monday- bowel prep tomorrow   7/12 VSS NPO for EGD/colonoscopy in am - coumadin on hold   7/13 pt npo for EGD/Colonoscopy today  7/14 VSS; colon/endo results negative for active bleeding, Patient planned for capsule study today to assess small bowels. Potassium K3.9 supplemented. WBC trending up (12) --> UA neg, Bcx pending. INR 1.62 --> Coumadin 2mg 65 y/o male PMH ACC/AHA stage D HF due to NICM HM2 LVAD , TV annuloplasty ring 9/12/17 as destination therapy due to severe peripheral artery disease with significant stenosis,  SIADH, Depression, CKD-3 with hyperkalemia, past E. coli UTIs and recently hospitalized in April due to COVID-19. Patient was readmitted for H/H 5.1/17 and had positive fecal occult with no active GI bleeding. He was transfused with 2 units PRBC on 7/9. H/H now stable at 7.9/25. Patient denies weakness, hematochezia, dizziness, SOB, CP, or confusion. Hemodynamically he remains stable and no LVAD alarms noted.        7/10 VSS; GI consulted for Guaiac+, Hemodynamically stable, H&H 7.9/25, no active GI bleeding. Pt denies dizziness, abdominal pain, weakness, or bloody stools. Placed on clear liquid diet. Plan for colonoscopy/endoscopy on Monday. Coumadin for LVAD held today with last INR 2.54. K 3.7 supplemented.   7/11 VSS EGD & colonscopy in am 7/12 Monday- bowel prep tomorrow   7/12 VSS NPO for EGD/colonoscopy in am - coumadin on hold   7/13 pt npo for EGD/Colonoscopy today  7/14 VSS; colon/endo results negative for active bleeding, Patient planned for capsule study today to assess small bowels. Potassium K3.9 supplemented. WBC trending up (12) --> UA neg, Bcx pending. INR 1.62 --> Coumadin 2mg -emotional support given

## 2020-07-14 NOTE — PROGRESS NOTE ADULT - SUBJECTIVE AND OBJECTIVE BOX
VITAL SIGNS    Telemetry: SR 80-90's  Vital Signs Last 24 Hrs  T(C): 36.9 (20 @ 06:10), Max: 37.7 (20 @ 22:16)  T(F): 98.4 (20 @ 06:10), Max: 99.9 (20 @ 22:16)  HR: 98 (20 @ 06:10) (62 - 111)  BP: 92/64 (20 @ 17:00) (92/64 - 115/57)  RR: 18 (20 @ 06:10) (17 - 18)  SpO2: 98% (20 @ 06:10) (96% - 98%)             @ 07:01  -   @ 07:00  --------------------------------------------------------  IN: 820 mL / OUT: 1525 mL / NET: -705 mL     @ 07:01  -   @ 10:00  --------------------------------------------------------  IN: 0 mL / OUT: 200 mL / NET: -200 mL       Daily     Daily Weight in k.3 (2020 07:34)  Admit Wt: Drug Dosing Weight  Height (cm): 154.9 (2020 10:01)  Weight (kg): 75.1 (2020 16:30)  BMI (kg/m2): 31.3 (2020 16:30)  BSA (m2): 1.74 (2020 16:30)    Bilirubin Total, Serum: 0.4 mg/dL ( @ 06:40)    CAPILLARY BLOOD GLUCOSE    MEDICATIONS  acetaminophen   Tablet .. 650 milliGRAM(s) Oral every 6 hours PRN  aMIOdarone    Tablet 200 milliGRAM(s) Oral daily  aspirin  chewable 81 milliGRAM(s) Oral daily  finasteride 5 milliGRAM(s) Oral daily  lisinopril 5 milliGRAM(s) Oral daily  metoprolol succinate ER 25 milliGRAM(s) Oral daily  mirtazapine 7.5 milliGRAM(s) Oral at bedtime  pantoprazole    Tablet 40 milliGRAM(s) Oral before breakfast  sodium chloride 0.9% lock flush 3 milliLiter(s) IV Push every 8 hours  spironolactone 25 milliGRAM(s) Oral daily  warfarin 2 milliGRAM(s) Oral once      >>> <<<  PHYSICAL EXAM  Subjective: " Hi, I am annoyed I have to get another study today." Denies CP, SOB, Dizziness, Palpitations, Chills, Nausea, Vomiting, Diarrhea, Headache.   Neurology: alert and oriented x 3, nonfocal, no gross deficits  CV : +VAD HUM   Lungs: CTABL, non labored respirations  Abdomen: soft, NT ,ND, (+ )BM  :  voiding without difficulties  Extremities: No LE edema bilaterally, +DP, No calf tenderness     LABS      134<L>  |  100  |  15  ----------------------------<  95  3.9   |  22  |  1.08    Ca    8.5      2020 06:40  Mg     2.4     07-14    TPro  6.7  /  Alb  3.7  /  TBili  0.4  /  DBili  x   /  AST  15  /  ALT  9<L>  /  AlkPhos  59  07-14                                 8.0    12.42 )-----------( 198      ( 2020 06:42 )             25.8          PT/INR - ( 2020 06:42 )   PT: 18.8 sec;   INR: 1.62 ratio         PTT - ( 2020 06:42 )  PTT:27.3 sec       PAST MEDICAL & SURGICAL HISTORY:  History of 2019 novel coronavirus disease (COVID-19): 2020  Pleural effusion  Depression  CAD (coronary artery disease)  CHF (congestive heart failure)  S/P ventricular assist device  S/P TVR (tricuspid valve repair)

## 2020-07-14 NOTE — PROGRESS NOTE ADULT - PROBLEM SELECTOR PLAN 1
S/P HM2 for NICM and S/P TV Ring in 2017  Speed 8800, Flow 4.2, PI 6.1  Continue on Toprol 25XL daily   Anticoagulated with Coumadin, currently held for anemia   Last INR 2.54  LDH normal (208)   HF following

## 2020-07-14 NOTE — PROGRESS NOTE ADULT - PROBLEM SELECTOR PLAN 1
- H/H slowly downtrending, currently H/H 7.7/25.4. Continue to transfuse as needed  - GI following, appreciate recommendations  - Underwent EGD and colonoscopy 7/13, no signs of bleeding however noted to have dark stool. Underwent capsule study today, will follow up with results

## 2020-07-14 NOTE — PROGRESS NOTE ADULT - SUBJECTIVE AND OBJECTIVE BOX
Subjective: Patient seen and examined resting in bed. ON no issues, remained NPO for capsule study today.    Medications:  acetaminophen   Tablet .. 650 milliGRAM(s) Oral every 6 hours PRN  aMIOdarone    Tablet 200 milliGRAM(s) Oral daily  aspirin  chewable 81 milliGRAM(s) Oral daily  finasteride 5 milliGRAM(s) Oral daily  heparin  Infusion 400 Unit(s)/Hr IV Continuous <Continuous>  lisinopril 5 milliGRAM(s) Oral daily  metoprolol succinate ER 25 milliGRAM(s) Oral daily  mirtazapine 7.5 milliGRAM(s) Oral at bedtime  pantoprazole    Tablet 40 milliGRAM(s) Oral before breakfast  sodium chloride 0.9% lock flush 3 milliLiter(s) IV Push every 8 hours  spironolactone 25 milliGRAM(s) Oral daily  warfarin 2 milliGRAM(s) Oral once      Vitals:  Vital Signs Last 24 Hours  T(C): 37.1 (20 @ 10:03), Max: 37.7 (20 @ 22:16)  HR: 79 (20 @ 10:03) (62 - 111)  BP: 92/64 (20 @ 17:00) (92/64 - 115/57)  RR: 18 (20 @ 10:03) (17 - 18)  SpO2: 97% (20 @ 10:03) (96% - 98%)    Weight in k.3 ( @ 07:34)    I&O's Summary    2020 07:  -  2020 07:00  --------------------------------------------------------  IN: 820 mL / OUT: 1525 mL / NET: -705 mL    :  -  2020 12:05  --------------------------------------------------------  IN: 0 mL / OUT: 200 mL / NET: -200 mL        Physical Exam  General: No distress. Comfortable.  HEENT: EOM intact.  Neck: Neck supple. JVP not elevated. No masses  Chest: Clear to auscultation bilaterally  CV: +VAD hum  Abdomen: Soft, non-distended, non-tender  Skin: No rashes or skin breakdown  Neurology: Alert and oriented times three. Sensation intact  Psych: Affect normal    LVAD Interrogation  VAD TYPE HM 2  Speed 8800  Flow 4.7  Power 4.8  PI 6.6  Assessment of driveline exit site: driveline dressing c/d/i  Programming changes: no programing changes made     Labs:                        7.7    12.37 )-----------( 202      ( 2020 10:42 )             25.4     07-14    134<L>  |  100  |  15  ----------------------------<  95  3.9   |  22  |  1.08    Ca    8.5      2020 06:40  Mg     2.4     -14    TPro  6.7  /  Alb  3.7  /  TBili  0.4  /  DBili  x   /  AST  15  /  ALT  9<L>  /  AlkPhos  59  07-14    PT/INR - ( 2020 06:42 )   PT: 18.8 sec;   INR: 1.62 ratio         PTT - ( 2020 06:42 )  PTT:27.3 sec    Lactate Dehydrogenase, Serum: 201 U/L ( @ 06:40)  Lactate Dehydrogenase, Serum: 215 U/L ( @ 06:06)      Imaging Studies     < from: Colonoscopy (20 @ 09:09) >  Impression:          - Stool in the entire examined colon and terminal ileum.                       - No active bleeding or source of bleeding identified in the colon.                       - Non-bleeding internal hemorrhoids.                       - No specimens collected.  Recommendation:      - [facility].               - Perform video capsule endoscopy tomorrow to evaluate for small bowel                        bleeding.                       - Resume regular diet today and keep NPO at midnight.    < end of copied text > Subjective: Patient seen and examined resting in bed. ON no issues, remained NPO for capsule study today.    Medications:  acetaminophen   Tablet .. 650 milliGRAM(s) Oral every 6 hours PRN  aMIOdarone    Tablet 200 milliGRAM(s) Oral daily  aspirin  chewable 81 milliGRAM(s) Oral daily  finasteride 5 milliGRAM(s) Oral daily  heparin  Infusion 400 Unit(s)/Hr IV Continuous <Continuous>  lisinopril 5 milliGRAM(s) Oral daily  metoprolol succinate ER 25 milliGRAM(s) Oral daily  mirtazapine 7.5 milliGRAM(s) Oral at bedtime  pantoprazole    Tablet 40 milliGRAM(s) Oral before breakfast  sodium chloride 0.9% lock flush 3 milliLiter(s) IV Push every 8 hours  spironolactone 25 milliGRAM(s) Oral daily  warfarin 2 milliGRAM(s) Oral once      Vitals:  Vital Signs Last 24 Hours  T(C): 37.1 (20 @ 10:03), Max: 37.7 (20 @ 22:16)  HR: 79 (20 @ 10:03) (62 - 111)  BP: 92/64 (20 @ 17:00) (92/64 - 115/57)  RR: 18 (20 @ 10:03) (17 - 18)  SpO2: 97% (20 @ 10:03) (96% - 98%)    Weight in k.3 ( @ 07:34)    I&O's Summary    2020 07:  -  2020 07:00  --------------------------------------------------------  IN: 820 mL / OUT: 1525 mL / NET: -705 mL    :  -  2020 12:05  --------------------------------------------------------  IN: 0 mL / OUT: 200 mL / NET: -200 mL        Physical Exam  General: No distress. Comfortable.  HEENT: EOM intact.  Neck: Neck supple. JVP not elevated. No masses  Chest: Clear to auscultation bilaterally  CV: +VAD hum  Abdomen: Soft, non-distended, non-tender  Skin: No rashes or skin breakdown  Neurology: Alert and oriented times three. Sensation intact  Psych: Affect normal    LVAD Education: reviewed and reinforced with patient     LVAD Interrogation  VAD TYPE HM 2  Speed 8800  Flow 4.7  Power 4.8  PI 6.6  Assessment of driveline exit site: driveline dressing c/d/i  Programming changes: no programing changes made     Labs:                        7.7    12.37 )-----------( 202      ( 2020 10:42 )             25.4     07-14    134<L>  |  100  |  15  ----------------------------<  95  3.9   |  22  |  1.08    Ca    8.5      2020 06:40  Mg     2.4     07-14    TPro  6.7  /  Alb  3.7  /  TBili  0.4  /  DBili  x   /  AST  15  /  ALT  9<L>  /  AlkPhos  59  07-14    PT/INR - ( 2020 06:42 )   PT: 18.8 sec;   INR: 1.62 ratio         PTT - ( 2020 06:42 )  PTT:27.3 sec    Lactate Dehydrogenase, Serum: 201 U/L ( @ 06:40)  Lactate Dehydrogenase, Serum: 215 U/L ( @ 06:06)      Imaging Studies     < from: Colonoscopy (20 @ 09:09) >  Impression:          - Stool in the entire examined colon and terminal ileum.                       - No active bleeding or source of bleeding identified in the colon.                       - Non-bleeding internal hemorrhoids.                       - No specimens collected.  Recommendation:      - [facility].               - Perform video capsule endoscopy tomorrow to evaluate for small bowel                        bleeding.                       - Resume regular diet today and keep NPO at midnight.    < end of copied text >

## 2020-07-14 NOTE — PROGRESS NOTE ADULT - ASSESSMENT
65 y/o male PMH ACC/AHA stage D HF due to NICM HM2 LVAD , TV annuloplasty ring 9/12/17 as destination therapy due to severe peripheral artery disease with significant stenosis,  SIADH, Depression, CKD-3 with hyperkalemia, past E. coli UTIs and recently hospitalized in April due to COVID-19. Patient was readmitted for H/H 5.1/17 and had positive fecal occult. He was transfused with 2 units PRBC on 7/9 with appropriate response. GI consulted and underwent EGD and colonoscopy today which did not show any bleeding at the time, however had dark stool noted. Underwent capsule study today. Hemodynamically remains stable, however, he has multiple episodes of NSVT, which was prolonged and associated with a low flow alarm on 7/12. Patient remains on [facility] for further management. 65 y/o male PMH ACC/AHA stage D HF due to NICM HM2 LVAD , TV annuloplasty ring 9/12/17 as destination therapy due to severe peripheral artery disease with significant stenosis,  SIADH, Depression, CKD-3 with hyperkalemia, past E. coli UTIs and recently hospitalized in April due to COVID-19. Patient was readmitted for H/H 5.1/17 and had positive fecal occult. He was transfused with 2 units PRBC on 7/9 with appropriate response. GI consulted and underwent EGD and colonoscopy today which did not show any bleeding at the time, though prep was poor. Underwent capsule study today. Hemodynamically remains stable, however, he has multiple episodes of NSVT, which was prolonged and associated with a low flow alarm on 7/12. Patient remains on [facility] for further management.

## 2020-07-15 LAB
ALBUMIN SERPL ELPH-MCNC: 3.6 G/DL — SIGNIFICANT CHANGE UP (ref 3.3–5)
ALP SERPL-CCNC: 57 U/L — SIGNIFICANT CHANGE UP (ref 40–120)
ALT FLD-CCNC: 10 U/L — SIGNIFICANT CHANGE UP (ref 10–45)
ANION GAP SERPL CALC-SCNC: 11 MMOL/L — SIGNIFICANT CHANGE UP (ref 5–17)
APTT BLD: 38 SEC — HIGH (ref 27.5–35.5)
APTT BLD: 38.2 SEC — HIGH (ref 27.5–35.5)
APTT BLD: 47.4 SEC — HIGH (ref 27.5–35.5)
APTT BLD: 49.2 SEC — HIGH (ref 27.5–35.5)
AST SERPL-CCNC: 14 U/L — SIGNIFICANT CHANGE UP (ref 10–40)
BILIRUB SERPL-MCNC: 0.2 MG/DL — SIGNIFICANT CHANGE UP (ref 0.2–1.2)
BUN SERPL-MCNC: 16 MG/DL — SIGNIFICANT CHANGE UP (ref 7–23)
CALCIUM SERPL-MCNC: 8.3 MG/DL — LOW (ref 8.4–10.5)
CHLORIDE SERPL-SCNC: 100 MMOL/L — SIGNIFICANT CHANGE UP (ref 96–108)
CO2 SERPL-SCNC: 21 MMOL/L — LOW (ref 22–31)
CREAT SERPL-MCNC: 1.16 MG/DL — SIGNIFICANT CHANGE UP (ref 0.5–1.3)
CULTURE RESULTS: SIGNIFICANT CHANGE UP
GLUCOSE SERPL-MCNC: 107 MG/DL — HIGH (ref 70–99)
HCT VFR BLD CALC: 26.1 % — LOW (ref 39–50)
HGB BLD-MCNC: 7.9 G/DL — LOW (ref 13–17)
INR BLD: 1.23 RATIO — HIGH (ref 0.88–1.16)
LDH SERPL L TO P-CCNC: 210 U/L — SIGNIFICANT CHANGE UP (ref 50–242)
MCHC RBC-ENTMCNC: 27.8 PG — SIGNIFICANT CHANGE UP (ref 27–34)
MCHC RBC-ENTMCNC: 30.3 GM/DL — LOW (ref 32–36)
MCV RBC AUTO: 91.9 FL — SIGNIFICANT CHANGE UP (ref 80–100)
NRBC # BLD: 0 /100 WBCS — SIGNIFICANT CHANGE UP (ref 0–0)
PLATELET # BLD AUTO: 205 K/UL — SIGNIFICANT CHANGE UP (ref 150–400)
POTASSIUM SERPL-MCNC: 4.1 MMOL/L — SIGNIFICANT CHANGE UP (ref 3.5–5.3)
POTASSIUM SERPL-SCNC: 4.1 MMOL/L — SIGNIFICANT CHANGE UP (ref 3.5–5.3)
PROT SERPL-MCNC: 6.8 G/DL — SIGNIFICANT CHANGE UP (ref 6–8.3)
PROTHROM AB SERPL-ACNC: 14.5 SEC — HIGH (ref 10.6–13.6)
RBC # BLD: 2.84 M/UL — LOW (ref 4.2–5.8)
RBC # FLD: 14.6 % — HIGH (ref 10.3–14.5)
SODIUM SERPL-SCNC: 132 MMOL/L — LOW (ref 135–145)
SPECIMEN SOURCE: SIGNIFICANT CHANGE UP
WBC # BLD: 9.6 K/UL — SIGNIFICANT CHANGE UP (ref 3.8–10.5)
WBC # FLD AUTO: 9.6 K/UL — SIGNIFICANT CHANGE UP (ref 3.8–10.5)

## 2020-07-15 PROCEDURE — 99232 SBSQ HOSP IP/OBS MODERATE 35: CPT

## 2020-07-15 PROCEDURE — 91110 GI TRC IMG INTRAL ESOPH-ILE: CPT | Mod: 26

## 2020-07-15 RX ORDER — WARFARIN SODIUM 2.5 MG/1
2 TABLET ORAL ONCE
Refills: 0 | Status: COMPLETED | OUTPATIENT
Start: 2020-07-15 | End: 2020-07-15

## 2020-07-15 RX ORDER — HEPARIN SODIUM 5000 [USP'U]/ML
700 INJECTION INTRAVENOUS; SUBCUTANEOUS
Qty: 25000 | Refills: 0 | Status: DISCONTINUED | OUTPATIENT
Start: 2020-07-15 | End: 2020-07-17

## 2020-07-15 RX ADMIN — SPIRONOLACTONE 25 MILLIGRAM(S): 25 TABLET, FILM COATED ORAL at 06:30

## 2020-07-15 RX ADMIN — HEPARIN SODIUM 7 UNIT(S)/HR: 5000 INJECTION INTRAVENOUS; SUBCUTANEOUS at 23:44

## 2020-07-15 RX ADMIN — WARFARIN SODIUM 2 MILLIGRAM(S): 2.5 TABLET ORAL at 22:56

## 2020-07-15 RX ADMIN — HEPARIN SODIUM 6.5 UNIT(S)/HR: 5000 INJECTION INTRAVENOUS; SUBCUTANEOUS at 04:09

## 2020-07-15 RX ADMIN — HEPARIN SODIUM 7 UNIT(S)/HR: 5000 INJECTION INTRAVENOUS; SUBCUTANEOUS at 12:44

## 2020-07-15 RX ADMIN — Medication 81 MILLIGRAM(S): at 11:34

## 2020-07-15 RX ADMIN — HEPARIN SODIUM 7 UNIT(S)/HR: 5000 INJECTION INTRAVENOUS; SUBCUTANEOUS at 18:45

## 2020-07-15 NOTE — PROGRESS NOTE ADULT - SUBJECTIVE AND OBJECTIVE BOX
VITAL SIGNS    Subjective: "I'm feeling ok." Denies CP, palpitation, SOB, PEÑA, HA, dizziness, N/V/D, fever or chills.  No acute event noted overnight.     Telemetry:  NSR      Vital Signs Last 24 Hrs  T(C): 37.2 (07-15-20 @ 06:27), Max: 37.2 (07-15-20 @ 06:27)  T(F): 98.9 (07-15-20 @ :27), Max: 98.9 (07-15-20 @ 06:27)  HR: 88 (07-15-20 @ 06:27) (79 - 90)  BP: --  RR: 18 (07-15-20 @ :27) (18 - 18)  SpO2: 97% (07-15-20 @ :27) (97% - 99%)            @ 07:01  -  07-15 @ 07:00  --------------------------------------------------------  IN: 825.5 mL / OUT: 1300 mL / NET: -474.5 mL    07-15 @ 07:  -  07-15 @ 09:44  --------------------------------------------------------  IN: 200 mL / OUT: 150 mL / NET: 50 mL    Daily     Daily Weight in k.6 (15 Jul 2020 07:22)      PHYSICAL EXAM    Neurology: alert and oriented x 3, nonfocal, no gross deficits    CV: (+) S1 and S2, (+) LVAD Hum     Lungs: CTA B/L     Abdomen: soft, nontender, nondistended, positive bowel sounds, (+) Flatus; (+) BM; RLQ LVAD site with occlusive dressing C/D/I      :  Voiding               Extremities:  B/L LE (-) edema; negative calf tenderness; (+) 2 DP palpable        acetaminophen Tablet .. 650 milliGRAM(s) Oral every 6 hours PRN  aMIOdarone Tablet 200 milliGRAM(s) Oral daily  aspirin  chewable 81 milliGRAM(s) Oral daily  finasteride 5 milliGRAM(s) Oral daily  heparin Infusion 400 Unit(s)/Hr IV Continuous <Continuous>  lisinopril 5 milliGRAM(s) Oral daily  metoprolol succinate ER 25 milliGRAM(s) Oral daily  mirtazapine 7.5 milliGRAM(s) Oral at bedtime  pantoprazole Tablet 40 milliGRAM(s) Oral before breakfast  sodium chloride 0.9% lock flush 3 milliLiter(s) IV Push every 8 hours  spironolactone 25 milliGRAM(s) Oral daily    Physical Therapy Rec:   Home  [ X ]   Home w/ PT  [  ]  Rehab  [  ]    Discussed with Cardiothoracic Team at AM rounds.

## 2020-07-15 NOTE — PROGRESS NOTE ADULT - PROBLEM SELECTOR PLAN 1
S/P HM2 for NICM and S/P TV Ring in 2017  Continue on Toprol 25XL daily   Anticoagulated with Coumadin, currently held for anemia   INR 1.23   LDH normal (210)   HF following

## 2020-07-15 NOTE — PROGRESS NOTE ADULT - PROBLEM SELECTOR PLAN 2
Readmit for anemia and elev INR --> S/P @ PRBC x 2 Units for H/H 5.1/17  +Fecal occult stool with no active GI bleeding  EGD & colonoscopy 7/13 NEG  H&H stable 8/25, Patient asymptomatic, Hemodynamically stable   GI consulted and following   Awaiting Capsule study results

## 2020-07-15 NOTE — PROGRESS NOTE ADULT - ASSESSMENT
63 y/o male PMH ACC/AHA stage D HF due to NICM HM2 LVAD , TV annuloplasty ring 9/12/17 as destination therapy due to severe peripheral artery disease with significant stenosis,  SIADH, Depression, CKD-3 with hyperkalemia, past E. coli UTIs and recently hospitalized in April due to COVID-19. Patient was readmitted for H/H 5.1/17 and had positive fecal occult with no active GI bleeding. He was transfused with 2 units PRBC on 7/9. H/H now stable at 7.9/25. Patient denies weakness, hematochezia, dizziness, SOB, CP, or confusion. Hemodynamically he remains stable and no LVAD alarms noted.      Hospital Course:   7/10 VSS; GI consulted for Guaiac+, Hemodynamically stable, H&H 7.9/25, no active GI bleeding. Pt denies dizziness, abdominal pain, weakness, or bloody stools. Placed on clear liquid diet. Plan for colonoscopy/endoscopy on Monday. Coumadin for LVAD held today with last INR 2.54. K 3.7 supplemented.   7/11 VSS EGD & colonoscopy in am 7/12 Monday- bowel prep tomorrow   7/12 VSS NPO for EGD/colonoscopy in am - coumadin on hold   7/13 pt npo for EGD/Colonoscopy today  7/14 VSS; colon/endo results negative for active bleeding, Patient planned for capsule study today to assess small bowels. Potassium K3.9 supplemented. WBC trending up (12) --> UA neg, Bcx pending. INR 1.62 --> Coumadin 2mg -emotional support given.   7/15 VVS; Continue with current medication regimen.  Awaiting capsule study results. 65 y/o male PMH ACC/AHA stage D HF due to NICM HM2 LVAD , TV annuloplasty ring 9/12/17 as destination therapy due to severe peripheral artery disease with significant stenosis,  SIADH, Depression, CKD-3 with hyperkalemia, past E. coli UTIs and recently hospitalized in April due to COVID-19. Patient was readmitted for H/H 5.1/17 and had positive fecal occult with no active GI bleeding. He was transfused with 2 units PRBC on 7/9. H/H now stable at 7.9/25. Patient denies weakness, hematochezia, dizziness, SOB, CP, or confusion. Hemodynamically he remains stable and no LVAD alarms noted.      Hospital Course:   7/10 VSS; GI consulted for Guaiac+, Hemodynamically stable, H&H 7.9/25, no active GI bleeding. Pt denies dizziness, abdominal pain, weakness, or bloody stools. Placed on clear liquid diet. Plan for colonoscopy/endoscopy on Monday. Coumadin for LVAD held today with last INR 2.54. K 3.7 supplemented.   7/11 VSS EGD & colonoscopy in am 7/12 Monday- bowel prep tomorrow   7/12 VSS NPO for EGD/colonoscopy in am - coumadin on hold   7/13 pt npo for EGD/Colonoscopy today  7/14 VSS; colon/endo results negative for active bleeding, Patient planned for capsule study today to assess small bowels. Potassium K3.9 supplemented. WBC trending up (12) --> UA neg, Bcx pending. INR 1.62 --> Coumadin 2mg -emotional support given.   7/15 VVS; Continue with current medication regimen.  Awaiting capsule study results. Leukocytosis improving WBC 9,000. Awaiting BC X 2.

## 2020-07-16 ENCOUNTER — TRANSCRIPTION ENCOUNTER (OUTPATIENT)
Age: 65
End: 2020-07-16

## 2020-07-16 LAB
ANION GAP SERPL CALC-SCNC: 10 MMOL/L — SIGNIFICANT CHANGE UP (ref 5–17)
APTT BLD: 46.9 SEC — HIGH (ref 27.5–35.5)
BUN SERPL-MCNC: 15 MG/DL — SIGNIFICANT CHANGE UP (ref 7–23)
CALCIUM SERPL-MCNC: 9 MG/DL — SIGNIFICANT CHANGE UP (ref 8.4–10.5)
CHLORIDE SERPL-SCNC: 99 MMOL/L — SIGNIFICANT CHANGE UP (ref 96–108)
CO2 SERPL-SCNC: 22 MMOL/L — SIGNIFICANT CHANGE UP (ref 22–31)
CREAT SERPL-MCNC: 1 MG/DL — SIGNIFICANT CHANGE UP (ref 0.5–1.3)
GLUCOSE SERPL-MCNC: 101 MG/DL — HIGH (ref 70–99)
HCT VFR BLD CALC: 25.6 % — LOW (ref 39–50)
HGB BLD-MCNC: 7.9 G/DL — LOW (ref 13–17)
INR BLD: 1.13 RATIO — SIGNIFICANT CHANGE UP (ref 0.88–1.16)
LDH SERPL L TO P-CCNC: 192 U/L — SIGNIFICANT CHANGE UP (ref 50–242)
MCHC RBC-ENTMCNC: 27.8 PG — SIGNIFICANT CHANGE UP (ref 27–34)
MCHC RBC-ENTMCNC: 30.9 GM/DL — LOW (ref 32–36)
MCV RBC AUTO: 90.1 FL — SIGNIFICANT CHANGE UP (ref 80–100)
NRBC # BLD: 0 /100 WBCS — SIGNIFICANT CHANGE UP (ref 0–0)
PLATELET # BLD AUTO: 239 K/UL — SIGNIFICANT CHANGE UP (ref 150–400)
POTASSIUM SERPL-MCNC: 4.2 MMOL/L — SIGNIFICANT CHANGE UP (ref 3.5–5.3)
POTASSIUM SERPL-SCNC: 4.2 MMOL/L — SIGNIFICANT CHANGE UP (ref 3.5–5.3)
PROTHROM AB SERPL-ACNC: 13.4 SEC — SIGNIFICANT CHANGE UP (ref 10.6–13.6)
RBC # BLD: 2.84 M/UL — LOW (ref 4.2–5.8)
RBC # FLD: 14.4 % — SIGNIFICANT CHANGE UP (ref 10.3–14.5)
SODIUM SERPL-SCNC: 131 MMOL/L — LOW (ref 135–145)
WBC # BLD: 8.2 K/UL — SIGNIFICANT CHANGE UP (ref 3.8–10.5)
WBC # FLD AUTO: 8.2 K/UL — SIGNIFICANT CHANGE UP (ref 3.8–10.5)

## 2020-07-16 PROCEDURE — 93750 INTERROGATION VAD IN PERSON: CPT

## 2020-07-16 PROCEDURE — 99232 SBSQ HOSP IP/OBS MODERATE 35: CPT | Mod: GC

## 2020-07-16 PROCEDURE — 99232 SBSQ HOSP IP/OBS MODERATE 35: CPT

## 2020-07-16 PROCEDURE — 99233 SBSQ HOSP IP/OBS HIGH 50: CPT | Mod: 25

## 2020-07-16 RX ORDER — WARFARIN SODIUM 2.5 MG/1
3 TABLET ORAL ONCE
Refills: 0 | Status: DISCONTINUED | OUTPATIENT
Start: 2020-07-16 | End: 2020-07-16

## 2020-07-16 RX ORDER — WARFARIN SODIUM 2.5 MG/1
3 TABLET ORAL ONCE
Refills: 0 | Status: COMPLETED | OUTPATIENT
Start: 2020-07-16 | End: 2020-07-16

## 2020-07-16 RX ADMIN — WARFARIN SODIUM 3 MILLIGRAM(S): 2.5 TABLET ORAL at 21:40

## 2020-07-16 RX ADMIN — HEPARIN SODIUM 7 UNIT(S)/HR: 5000 INJECTION INTRAVENOUS; SUBCUTANEOUS at 11:23

## 2020-07-16 RX ADMIN — SPIRONOLACTONE 25 MILLIGRAM(S): 25 TABLET, FILM COATED ORAL at 06:00

## 2020-07-16 RX ADMIN — Medication 81 MILLIGRAM(S): at 11:22

## 2020-07-16 NOTE — PROGRESS NOTE ADULT - PROBLEM SELECTOR PLAN 2
- Continue at current speed @ 8800. Noted to have one low flow alarm on 7/12.  - Continue ASA 81 mg PO daily  - Placed on heparin gtt (PTT goal 60-80) bridge to Coumadin (INR goal 2-2.5). Will dose Coumadin _____ mg tonight. - Continue at current speed @ 8800. Noted to have one low flow alarm on 7/12.  - Continue ASA 81 mg PO daily  - Placed on heparin gtt (PTT goal 60-80) bridge to Coumadin (INR goal 2-2.5). Will dose Coumadin 3 mg tonight.

## 2020-07-16 NOTE — PROGRESS NOTE ADULT - SUBJECTIVE AND OBJECTIVE BOX
Chief Complaint: Weakness  Reason for consult: Anemia    Interval Events:   - EGD and colonoscopy performed on 20 with no source of anemia identified (Please see full report in Results section of [facility])  - Video Capsule Endoscopy performed on 7/15/20 with no source of anemia or active bleeding identified (Please see full report in Results section of [facility])  - Patient continues to endorse dark colored stools    Allergies:  No Known Allergies    MEDICATIONS  (STANDING):  aMIOdarone    Tablet 200 milliGRAM(s) Oral daily  aspirin  chewable 81 milliGRAM(s) Oral daily  finasteride 5 milliGRAM(s) Oral daily  heparin  Infusion 700 Unit(s)/Hr (7 mL/Hr) IV Continuous <Continuous>  lisinopril 5 milliGRAM(s) Oral daily  metoprolol succinate ER 25 milliGRAM(s) Oral daily  mirtazapine 7.5 milliGRAM(s) Oral at bedtime  pantoprazole    Tablet 40 milliGRAM(s) Oral before breakfast  sodium chloride 0.9% lock flush 3 milliLiter(s) IV Push every 8 hours  spironolactone 25 milliGRAM(s) Oral daily    MEDICATIONS  (PRN):  acetaminophen   Tablet .. 650 milliGRAM(s) Oral every 6 hours PRN Temp greater or equal to 38C (100.4F), Moderate Pain (4 - 6)    PMHX/PSHX:  History of 2019 novel coronavirus disease (COVID-19)  Pleural effusion  Depression  CAD (coronary artery disease)  CHF (congestive heart failure)  No pertinent past medical history  S/P ventricular assist device  S/P TVR (tricuspid valve repair)  No significant past surgical history    Family history:  No pertinent family history in first degree relatives    ROS:     General:  No wt loss, fevers, chills, night sweats, + fatigue  Eyes:  Good vision, no reported pain  ENT:  No sore throat, pain, runny nose, dysphagia  CV:  No pain, palpitations, hypo/hypertension  Pulm:  No dyspnea, cough, tachypnea, wheezing  GI:  No pain, No nausea, No vomiting, No diarrhea, No constipation, No weight loss, No fever, No pruritis, No rectal bleeding, No tarry stools, No dysphagia  :  No pain, bleeding, incontinence, nocturia  Muscle:  No pain, weakness  Neuro:  No weakness, tingling, memory problems  Psych:  No fatigue, insomnia, mood problems, depression  Endocrine:  No polyuria, polydipsia, cold/heat intolerance  Heme:  No petechiae, ecchymosis, easy bruisability  Skin:  No rash, tattoos, scars, edema    PHYSICAL EXAM:   Vital Signs:  Vital Signs Last 24 Hrs  T(C): 36.8 (2020 05:56), Max: 37.1 (15 Jul 2020 18:05)  T(F): 98.2 (2020 05:56), Max: 98.7 (15 Jul 2020 18:05)  HR: 84 (2020 05:56) (82 - 88)  BP: --  BP(mean): 84 (2020 05:56) (80 - 84)  RR: 18 (2020 05:56) (18 - 18)  SpO2: 98% (2020 05:56) (97% - 98%)   Daily Weight in k.7 (2020 07:31)    GENERAL:  No acute distress  HEENT:  Normocephalic/atraumatic,  no scleral icterus  CHEST:  Normal effort, no accessory muscle use  HEART:  +LVAD hum  ABDOMEN:  Soft, non-tender, non-distended, normoactive bowel sounds, drive-line site c/d/i  EXTREMITIES:  No cyanosis, clubbing, or edema  SKIN:  No rash/erythema  NEURO:  Alert and oriented x 3    LABS:                        7.9    8.20  )-----------( 239      ( 2020 07:08 )             25.6     07-16    131<L>  |  99  |  15  ----------------------------<  101<H>  4.2   |  22  |  1.00    Ca    9.0      2020 07:07    TPro  6.8  /  Alb  3.6  /  TBili  0.2  /  DBili  x   /  AST  14  /  ALT  10  /  AlkPhos  57  07-15    LIVER FUNCTIONS - ( 15 Jul 2020 03:18 )  Alb: 3.6 g/dL / Pro: 6.8 g/dL / ALK PHOS: 57 U/L / ALT: 10 U/L / AST: 14 U/L / GGT: x           PT/INR - ( 2020 07:08 )   PT: 13.4 sec;   INR: 1.13 ratio         PTT - ( 2020 07:08 )  PTT:46.9 sec    Imaging:    Reviewed    Procedures:    < from: Upper Endoscopy (20 @ 09:09) >    [facility]  ____________________________________________________________________________________________________  Patient Name: Vic Quispe                       MRN: [id number]  Account Number: [id number]                     YOB: 1955  Room: Endoscopy Room 1                           Gender: Male  Attending MD: Ian Lau MD          Procedure Date No Time: 2020  ____________________________________________________________________________________________________     Procedure:           Upper GI endoscopy  Indications:         Iron deficiency anemia  Providers:           Ian Lau MD  Medicines:           Monitored Anesthesia Care  Complications:       No immediate complications.  ____________________________________________________________________________________________________  Procedure:           Pre-Anesthesia Assessment:                       - Prior to the procedure, a History and Physical was performed, and patient                        medications, allergies and sensitivities were reviewed. The patient's                        tolerance of previous anesthesia was reviewed.                       - The risks and benefits of the procedure and the sedation options and risks                        were discussed with the patient. All questions were answered and informed                        consent was obtained.                       - Pre-procedure physical examination revealed no contraindications to                       sedation.                       - ASA Grade Assessment: IV - A patient with severe systemic disease that is a                        constant threat to life.                       - Sedation was administered by an anesthesia professional. Deep sedation was                        attained.                       - The heart rate, respiratory rate, oxygen saturations, blood pressure,                        adequacy of pulmonary ventilation, and response to care were monitored                      throughout the procedure.                       - The physical status of the patient was re-assessed after the procedure.                       After obtaining informed consent, the endoscope was passed under direct          vision. Throughout the procedure, the patient's blood pressure, pulse, and                        oxygen saturations were monitored continuously. The Endoscope was introduced                        through the mouth, and advanced to the secondpart of duodenum. The upper GI                        endoscopy was accomplished without difficulty. The patient tolerated the                        procedure well.                    Findings:       The examined esophagus was normal.       The Z-line was regular and was found 38 cm from the incisors.       A 2 cm hiatus hernia was present.       The entire examined stomach was normal.       The examined duodenumwas normal.                                                                                                        Impression:          - Normal esophagus.                       - Z-line regular, 38 cm from the incisors.                       - 2 cm hiatus hernia.                       - Normal stomach.                       - Normal examined duodenum.                       - No specimens collected.  Recommendation:      - Perform a colonoscopy today.                                                           Attending Participation:       I personally performed the entire procedure.                                                                                                          ________________________  Ian Lau MD  2020 10:25:03 AM  Number of Addenda: 0    Note Initiated On: 2020 9:09 AM    < end of copied text >    < from: Colonoscopy (20 @ 09:09) >    [facility]  ____________________________________________________________________________________________________  Patient Name: Vic Quispe                       MRN: [id number]  Account Number: [id number]                     YOB: 1955  Room: Endoscopy Room 1                           Gender: Male  Attending MD: Ian Lau MD          Procedure Date No Time: 2020  ____________________________________________________________________________________________________     Procedure:           Colonoscopy  Indications:         Iron deficiency anemia secondary to chronic blood loss  Providers:           Ian Lau MD, Wanda Howard (Fellow)  Referring MD:        Janusz Cadet  Medicines:           Monitored Anesthesia Care  Complications:       No immediate complications.  ____________________________________________________________________________________________________  Procedure:           Pre-Anesthesia Assessment:                    - Prior to the procedure, a History and Physical was performed, and patient                        medications and allergies were reviewed. The patient is competent. The risks                        and benefits of the procedure andthe sedation options and risks were                        discussed with the patient. All questions were answered and informed consent                        was obtained. Patient identification and proposed procedure were verified by           the physician, the nurse and the anesthesiologist in the pre-procedure area.                        Mental Status Examination: normal. Prophylactic Antibiotics: The patient does                        not require prophylactic antibiotics. Prior Anticoagulants: The patient has                        taken no previous anticoagulant or antiplatelet agents. ASA Grade Assessment:                        IV - A patient with severe systemic disease that is a constant threat to      life. After reviewing the risks and benefits, the patient was deemed in                        satisfactory condition to undergo the procedure. The anesthesia plan was to                        use monitored anesthesia care (MAC). Immediately prior to administration of                        medications, the patient was re-assessed for adequacy to receive sedatives.                        The heart rate, respiratory rate, oxygen saturations, blood pressure,                        adequacy of pulmonary ventilation, and response to care were monitored                        throughout the procedure. The physical status of the patient was re-assessed                        after the procedure.                       After I obtained informed consent, the scope was passed under direct vision.                        Throughout the procedure, the patient's blood pressure, pulse, and oxygen                        saturations were monitored continuously. The Colonoscope was introduced                through the anus and advanced to the terminal ileum. The colonoscopy was                        performed without difficulty. The patient tolerated the procedure well. The                        quality of the bowel preparation was evaluated using the BBPS ([place] Bowel                        Preparation Scale) with scores of: Right Colon = 1 (portion of mucosa seen,                        but other areas not well seen due to staining, residual stool and/or opaque      liquid), Transverse Colon = 2 (minor amount of residual staining, small                        fragments of stool and/or opaque liquid, but mucosa seen well) and Left Colon                        = 2 (minor amount of residual staining, small fragments of stool and/or                        opaque liquid, but mucosa seen well). The total BBPS score equals 5. The                        quality of the bowel preparation was fair. The quality of the bowel prep is                        inadequate for detection of small polyps or lesions, or for colon cancer                        screening.                                                                                                        Findings:       A large amount of dark green liquid and solid stool was found in the entire colon interfering        with visualization.       The colon was otherwise without abnormality.       Liquid brown stool was found in the terminal ileum.       Non-bleeding internal hemorrhoids were found during retroflexion. The hemorrhoids were small.                                                                                                        Impression:          - Stool in the entire examined colon and terminal ileum.                       - No active bleeding or source of bleeding identified in the colon.                       - Non-bleeding internal hemorrhoids.                       - No specimens collected.  Recommendation:      - [facility] care.               - Perform video capsule endoscopy tomorrow to evaluate for small bowel                        bleeding.                       - Resume regular diet today and keep NPO at midnight.                                               Attending Participation:       I was present and participated during the entire procedure, including non-key portions.                    ________________________  Ian Lau MD  2020 4:43:58 PM  Number of Addenda: 0    Note Initiated On: 2020 9:09 AM    < end of copied text >    < from: Capsule Endoscopy (07.15.20 @ 14:17) >    [facility]  ____________________________________________________________________________________________________  Patient Name: Vic Quispe                       MRN: [id number]  Account Number: [id number]                     YOB: 1955  Room: Bedside                                    Gender: Male  Attending MD: Ian Lau MD          Procedure Date No Time: 7/15/2020  ____________________________________________________________________________________________________     Procedure:           Video capsule endoscopy  Indications:         Obscure gastrointestinal bleeding  Providers:           Ian Lau MD  Medicines:           None  Complications:       No immediate complications.  ____________________________________________________________________________________________________  Procedure:           Pre-Anesthesia Assessment:                       - Prior to the procedure, a History and Physical was performed, and patient                        medications, allergies and sensitivities were reviewed. The patient's                        tolerance of previous anesthesia was reviewed.                       - The risks and benefits of the procedure and the sedation options and risks                        were discussed with the patient. All questions were answered and informed                        consent was obtained.                       - Pre-procedure physical examination revealed no contraindications to                  sedation.                       - ASA Grade Assessment: IV - A patient with severe systemic disease that is a                        constant threat to life.                       After obtaining informed consent, the patient was giventhe capsule                        enteroscope, which was swallowed. The video capsule endoscopy was                        accomplished without difficulty. The patient tolerated the procedure well.                       Capsule ID [id number]             Gastric passage time: 0h 16m, Small bowel passage time: 3h 55m                       The risks and benefits of capsule endoscopy including the risks of capsule                        retention requiring endoscopic or                       surgical removal was explained. Signed informed consent was obtained.                       The patient tolerated the capsule with 1 cup of water by mouth, no                        complications.                       The capsule is in the colon at the end of the study.                       See paper chart for full report/images from this study.                                                                                                        Findings:       Images of the esophagus, stomach and small bowel were obtained from the swallowed capsule and        reviewed.       No significant pathology seen in the examined small bowel. No signs of bleeding seen in the        small intestine.                                                     Impression:          - Normal video capsule endoscopy.  Recommendation:      - [facility] patient to [facility] care.                       - Continue to monitor CBCs and for clinical signs of bleeding                       - No objection to resume anticoagulation as needed with close monitoring,                        since no GI bleeding is found so far.                        Attending Participation:       I was present and participated during the entire procedure, including non-key portions.                                                                                                          ________________________  Ian Lau MD  7/15/2020 2:21:00 PM  Number of Addenda: 0    Note Initiated On: 7/15/2020 2:17 PM    < end of copied text >

## 2020-07-16 NOTE — PROGRESS NOTE ADULT - PROBLEM SELECTOR PLAN 1
S/P HM2 for NICM and S/P TV Ring in 2017  Continue on Toprol 25XL daily    Anticoagulated with Coumadin   INR 1.15 - coumadin 3 mg tonight    LDH normal (192)   HF following

## 2020-07-16 NOTE — DISCHARGE NOTE PROVIDER - NSDCFUADDINST_GEN_ALL_CORE_FT
1. Change the percutaneous lead exit site dressing as instructed using sterile technique.   2. Inspect the percutaneous lead exit during dressing change for sings and symptoms of infection, such as redness, swelling, discharge, odor, or warmth.  3. Call MD for a fever greater than 100.5 degrees or below 96 degrees  4. Record BP, LVAD speed, power, PI abd flow daily   5. Do Not twist or kink your percutaneous lead.   6. Inspect the percutaneous lead connection to the  and make sure the perc lock is in the locked position daily.  Do NOT take tub baths or go swimming with LVAD  7. Showers are allowed only after approved by your medical provider.  Do Not take showers without using the [employer] shower bag to protect your equipment from water.

## 2020-07-16 NOTE — DISCHARGE NOTE PROVIDER - NSDCPNSUBOBJ_GEN_ALL_CORE
Subjective: Patient seen and examined resting in bed. ON no issues. Overall progressing well.         Medications:    acetaminophen   Tablet .. 650 milliGRAM(s) Oral every 6 hours PRN    aMIOdarone    Tablet 200 milliGRAM(s) Oral daily    aspirin  chewable 81 milliGRAM(s) Oral daily    finasteride 5 milliGRAM(s) Oral daily    heparin  Infusion 700 Unit(s)/Hr IV Continuous <Continuous>    lisinopril 5 milliGRAM(s) Oral daily    metoprolol succinate ER 25 milliGRAM(s) Oral daily    mirtazapine 7.5 milliGRAM(s) Oral at bedtime    pantoprazole    Tablet 40 milliGRAM(s) Oral before breakfast    sodium chloride 0.9% lock flush 3 milliLiter(s) IV Push every 8 hours    spironolactone 25 milliGRAM(s) Oral daily            Vitals:    Vital Signs Last 24 Hours    T(C): 36.7 (20 @ 06:25), Max: 37.1 (20 @ 21:27)    HR: 87 (20 @ 06:25) (77 - 88)    BP: --    RR: 18 (20 @ 06:25) (18 - 18)    SpO2: 99% (20 @ 06:25) (98% - 99%)        Weight in k.6 ( @ 15:30)        I&O's Summary        2020 07:01  -  2020 07:00    --------------------------------------------------------    IN: 1248 mL / OUT: 1300 mL / NET: -52 mL                Physical Exam    General: No distress. Comfortable.    HEENT: EOM intact.    Neck: Neck supple. JVP not elevated. No masses    Chest: Clear to auscultation bilaterally    CV: +VAD hum    Abdomen: Soft, non-distended, non-tender    Skin: No rashes or skin breakdown    Neurology: Alert and oriented times three. Sensation intact    Psych: Affect normal        LVAD Interrogation    VAD TYPE HM 2    Speed 8800    Flow 4.2    Power 4.6    PI 6.9    Assessment of driveline exit site: driveline dressing c/d/i    Programming changes: no programing changes made         Labs:                            8.4      6.24  )-----------( 254      ( 2020 06:02 )               27.4         07-        133<L>  |  99  |  14    ----------------------------<  97    4.0   |  23  |  0.99        Ca    9.0      2020 06:02        TPro  7.2  /  Alb  3.9  /  TBili  0.2  /  DBili  x   /  AST  15  /  ALT  8<L>  /  AlkPhos  61  07-        PT/INR - ( 2020 06:02 )   PT: 13.0 sec;   INR: 1.10 ratio               PTT - ( 2020 06:02 )  PTT:44.7 sec            Lactate Dehydrogenase, Serum: 182 U/L ( @ 06:02)    Lactate Dehydrogenase, Serum: 192 U/L ( @ 07:07)    Lactate Dehydrogenase, Serum: 210 U/L (07-15 @ 03:18)            < from: Capsule Endoscopy (07.15.20 @ 14:17) >        Impression:          - Normal video capsule endoscopy.    Recommendation:      - [facility].                         - Continue to monitor CBCs and for clinical signs of bleeding                         - No objection to resume anticoagulation as needed with close monitoring,                          since no GI bleeding is found so far.        < end of copied text >                < from: Colonoscopy (20 @ 09:09) >        Impression:          - Stool in the entire examined colon and terminal ileum.                         - No active bleeding or source of bleeding identified in the colon.                         - Non-bleeding internal hemorrhoids.                         - No specimens collected.    Recommendation:      - [facility].                 - Perform video capsule endoscopy tomorrow to evaluate for small bowel                          bleeding.                         - Resume regular diet today and keep NPO at midnight.        < end of copied text >            < from: Upper Endoscopy (20 @ 09:09) >                Impression:          - Normal esophagus.                         - Z-line regular, 38 cm from the incisors.                         - 2 cm hiatus hernia.                         - Normal stomach.                         - Normal examined duodenum.                         - No specimens collected.    Recommendation:      - Perform a colonoscopy today.        < end of copied text >

## 2020-07-16 NOTE — DIETITIAN INITIAL EVALUATION ADULT. - ENERGY NEEDS
Ht: 5'8",  Wt: 169lbs, BMI: 25.6kg/m2, IBW: 154lbs(+/-10%), 109%IBW  Pertinent information: 64 year old male with PMHx of HF with HM 2 LVAD in 9/2017, recent COVID 19 admittance. Admit for hypotension and drop in H/H. Colonoscopy and EGD and capsule study all negative.    No Edema, Skin: intact

## 2020-07-16 NOTE — PROGRESS NOTE ADULT - ASSESSMENT
63 y/o male PMH ACC/AHA stage D HF due to NICM HM2 LVAD , TV annuloplasty ring 9/12/17 as destination therapy due to severe peripheral artery disease with significant stenosis,  SIADH, Depression, CKD-3 with hyperkalemia, past E. coli UTIs and recently hospitalized in April due to COVID-19. Patient was readmitted for H/H 5.1/17 and had positive fecal occult. He was transfused with 2 units PRBC on 7/9 with appropriate response. GI consulted and underwent EGD and colonoscopy today which did not show any bleeding at the time, though prep was poor. Capsule study on 7/14 was normal. Hemodynamically remains stable, however, he has multiple episodes of NSVT, which was prolonged and associated with a low flow alarm on 7/12. Patient remains on [facility] for further management. 65 y/o male PMH ACC/AHA stage D HF due to NICM HM2 LVAD , TV annuloplasty ring 9/12/17 as destination therapy due to severe peripheral artery disease with significant stenosis,  SIADH, Depression, CKD-3 with hyperkalemia, past E. coli UTIs and recently hospitalized in April due to COVID-19. Patient was readmitted for H/H 5.1/17 and had positive fecal occult. He was transfused with 2 units PRBC on 7/9 with appropriate response. GI consulted and underwent EGD and colonoscopy today which did not show any bleeding at the time, though prep was poor. Capsule study on 7/14 was normal. Hemodynamically remains stable, however, he has multiple episodes of NSVT, which was prolonged and associated with a low flow alarm on 7/12 and 7/14. Patient remains on [facility] for further management and will be discharged home once INR is therapeutic.

## 2020-07-16 NOTE — PROGRESS NOTE ADULT - PROBLEM SELECTOR PLAN 1
- H/H stable, currently H/H 7.9/25.6. Continue to transfuse as needed  - GI following, appreciate recommendations  - Underwent EGD and colonoscopy 7/13, no signs of bleeding however noted to have dark stool. Capsule study 7/14 nomral - H/H stable, currently H/H 7.9/25.6. Continue to transfuse as needed  - GI following, appreciate recommendations  - Underwent EGD and colonoscopy 7/13, no signs of bleeding however noted to have dark stool. Capsule study 7/14 normal

## 2020-07-16 NOTE — DISCHARGE NOTE PROVIDER - NSDCACTIVITY_GEN_ALL_CORE
Walking - Outdoors allowed/No heavy lifting/straining/Showering allowed/Driving allowed/Walking - Indoors allowed

## 2020-07-16 NOTE — DIETITIAN INITIAL EVALUATION ADULT. - OTHER INFO
Pt seen, has limited interest in RD interview.   Pt reports a good PO intake PTA. States he does his own food shopping and cooking. Pt endorses he consumes a diet low in salt.   Breakfast: eggs, bread. Lunch: rice and chicken, Dinner: Salad drinks water.     Pt reports UBW to be 160lbs.  Pre LVAD implant Pt was 145lbs in 9/2017 and post LVAD dropped to 128lbs due to prolonged and complicated hospital course. Pt is now currently 168lbs. Pt presents with a positive weight gain, but will continue to trend and monitor. Pt reports he does monitor his weight daily and denies any fluid weight changes at this time.     Pt states a good PO intake in house, currently consuming % of meals.   Pt denies any questions or concerns regarding nutrition at this time. Pt is aware that RD remains available     Pt denies GI distress, chewing/swallowing difficulty, micronutrient supplements. NKFA

## 2020-07-16 NOTE — PROGRESS NOTE ADULT - ASSESSMENT
Impression:  65 yo male with PMH of AHA stage D HF 2/2 NICM with HM2 LVAD (on coumadin) as destination therapy due to severe PAD, Depression, CKD-3 with hyperkalemia who presents with weakness, back and LE pain, found to have anemia with no overt GI bleeding    # Normocytic anemia: No bleeding or source of anemia identified on EGD, colonoscopy, or video capsule endoscopy. Currently with no overt bleeding and with Hgb stable at 7.9  # LVAD on AC  # PAD    Recommendations:  - No further endoscopic work-up planned at this time  - Ok to resume anti-coagulation  - Diet as tolerated  - Monitor CBCs daily  - Rest of care per primary team    Tae Calderon MD  Gastroenterology Fellow  Please contact via [employer] Teams    Please call GI ([phone]) or e-mail [email or web address] if there are any additional questions or concerns, during weekdays from 8 am to 5 pm.     Please call answering service for on-call GI fellow ([phone]) after 5pm and before 8am, and on weekends. Impression:  65 yo male with PMH of AHA stage D HF 2/2 NICM with HM2 LVAD (on coumadin) as destination therapy due to severe PAD, Depression, CKD-3 with hyperkalemia who presents with weakness, back and LE pain, found to have anemia with no overt GI bleeding    # Normocytic anemia: No bleeding or source of anemia identified on EGD, colonoscopy, or video capsule endoscopy. Currently with no overt bleeding and with Hgb stable at 7.9  # LVAD on AC  # PAD    Recommendations:  - No further endoscopic work-up planned at this time; pan-endoscopy negative for bleeding  - Ok to resume anti-coagulation with careful monitoring  - Diet as tolerated  - Monitor CBCs daily  - Rest of care per primary team    Tae Calderon MD  Gastroenterology Fellow  Please contact via [employer] Teams    Please call GI ([phone]) or e-mail [email or web address] if there are any additional questions or concerns, during weekdays from 8 am to 5 pm.     Please call answering service for on-call GI fellow ([phone]) after 5pm and before 8am, and on weekends.

## 2020-07-16 NOTE — DIETITIAN INITIAL EVALUATION ADULT. - PERTINENT LABORATORY DATA
07-16 @ 07:08: Sodium --, Potassium --, Chloride --, Calcium --, Magnesium --, Phosphorus --, BUN --, Creatinine --, BG --, Alk Phos --, ALT/SGPT --, AST/SGOT --, HbA1c --, Total Protein --, Albumin --, Prealbumin --, Total Bilirubin --, Direct Bilirubin --, Hemoglobin 7.9<L>, Hematocrit 25.6<L>  07-16 @ 07:07: Sodium 131<L>, Potassium 4.2, Chloride 99, Calcium 9.0, Magnesium --, Phosphorus --, BUN 15, Creatinine 1.00, <H>, Alk Phos --, ALT/SGPT --, AST/SGOT --, HbA1c --, Total Protein --, Albumin --, Prealbumin --, Total Bilirubin --, Direct Bilirubin --, Hemoglobin --, Hematocrit --

## 2020-07-16 NOTE — DISCHARGE NOTE PROVIDER - NSDCFUADDAPPT_GEN_ALL_CORE_FT
- Will follow up with LVAD Coordinator on ______  - Next INR check will be on Monday 7/20 - Will follow up with LVAD Coordinator on Thursday July 23rd at 830 am  - Next INR check will be on Monday 7/20. Will have CBC completed at same time.

## 2020-07-16 NOTE — DISCHARGE NOTE PROVIDER - NSDCCPCAREPLAN_GEN_ALL_CORE_FT
PRINCIPAL DISCHARGE DIAGNOSIS  Diagnosis: Anemia  Assessment and Plan of Treatment: GI workup negative during this admission      SECONDARY DISCHARGE DIAGNOSES  Diagnosis: LVAD (left ventricular assist device) present  Assessment and Plan of Treatment:

## 2020-07-16 NOTE — PROGRESS NOTE ADULT - SUBJECTIVE AND OBJECTIVE BOX
Subjective: Patient seen and examined resting in bed. ON no issues. Has no complaints.     Medications:  acetaminophen   Tablet .. 650 milliGRAM(s) Oral every 6 hours PRN  aMIOdarone    Tablet 200 milliGRAM(s) Oral daily  aspirin  chewable 81 milliGRAM(s) Oral daily  finasteride 5 milliGRAM(s) Oral daily  heparin  Infusion 700 Unit(s)/Hr IV Continuous <Continuous>  lisinopril 5 milliGRAM(s) Oral daily  metoprolol succinate ER 25 milliGRAM(s) Oral daily  mirtazapine 7.5 milliGRAM(s) Oral at bedtime  pantoprazole    Tablet 40 milliGRAM(s) Oral before breakfast  sodium chloride 0.9% lock flush 3 milliLiter(s) IV Push every 8 hours  spironolactone 25 milliGRAM(s) Oral daily    Vitals:  Vital Signs Last 24 Hours  T(C): 36.8 (20 @ 05:56), Max: 37.1 (07-15-20 @ 18:05)  HR: 84 (20 @ 05:56) (82 - 88)  BP: --  RR: 18 (20 @ 05:56) (18 - 18)  SpO2: 98% (20 @ 05:56) (97% - 98%)    Weight in k.7 ( @ 07:31)    I&O's Summary    15 Jul 2020 07:  -  2020 07:00  --------------------------------------------------------  IN: 837 mL / OUT: 1525 mL / NET: -688 mL    2020 07:  -  2020 08:25  --------------------------------------------------------  IN: 0 mL / OUT: 200 mL / NET: -200 mL        Physical Exam  General: No distress. Comfortable.  HEENT: EOM intact.  Neck: Neck supple. JVP not elevated. No masses  Chest: Clear to auscultation bilaterally  CV: +VAD hum  Abdomen: Soft, non-distended, non-tender  Skin: No rashes or skin breakdown  Neurology: Alert and oriented times three. Sensation intact  Psych: Affect normal    LVAD Interrogation  VAD TYPE HM 2  Speed 8800  Flow 5.3  Power 5.1  PI  6.2  Assessment of driveline exit site: driveline dressing c/d/i  Programming changes: no programing changes made     Labs:                        7.9    8.20  )-----------( 239      ( 2020 07:08 )             25.6     07-    131<L>  |  99  |  15  ----------------------------<  101<H>  4.2   |  22  |  1.00    Ca    9.0      2020 07:07    TPro  6.8  /  Alb  3.6  /  TBili  0.2  /  DBili  x   /  AST  14  /  ALT  10  /  AlkPhos  57  07-15    PT/INR - ( 2020 07:08 )   PT: 13.4 sec;   INR: 1.13 ratio         PTT - ( 2020 07:08 )  PTT:46.9 sec    Lactate Dehydrogenase, Serum: 192 U/L ( @ 07:07)  Lactate Dehydrogenase, Serum: 210 U/L (07-15 @ 03:18)  Lactate Dehydrogenase, Serum: 201 U/L ( @ 06:40)      Imaging Studies     < from: Capsule Endoscopy (07.15.20 @ 14:17) >  Impression:          - Normal video capsule endoscopy.  < end of copied text >

## 2020-07-16 NOTE — DISCHARGE NOTE PROVIDER - CARE PROVIDER_API CALL
Tamanna Mcclendon NP IN ADULT HEALTH  [address] [place] [address] [place] [address]  Phone: [phone]  Fax: [phone]  Follow Up Time:

## 2020-07-16 NOTE — DISCHARGE NOTE PROVIDER - HOSPITAL COURSE
65 y/o male PMH ACC/AHA stage D HF due to NICM HM2 LVAD , TV annuloplasty ring 9/12/17 as destination therapy due to severe peripheral artery disease with significant stenosis,  SIADH, Depression, CKD-3 with hyperkalemia, past E. coli UTIs and recently hospitalized in April due to COVID-19. Patient was readmitted for H/H 5.1/17 and had positive fecal occult. He was transfused with 2 units PRBC on 7/9 with appropriate response. GI consulted and underwent EGD and colonoscopy today which did not show any bleeding at the time, though prep was poor. Capsule study on 7/14 was normal. H/H has remained stable, on the day of discharge _______. Hemodynamically remains stable, however while admitted was noted to have multiple episodes of NSVT, which was prolonged and associated with a low flow alarm on 7/12 and 7/14 (now resolved). Patient has progressed well and will be discharged home. Will have labs completed as outpatient. 63 y/o male PMH ACC/AHA stage D HF due to NICM HM2 LVAD , TV annuloplasty ring 9/12/17 as destination therapy due to severe peripheral artery disease with significant stenosis,  SIADH, Depression, CKD-3 with hyperkalemia, past E. coli UTIs and recently hospitalized in April due to COVID-19. Patient was readmitted for H/H 5.1/17 and had positive fecal occult. He was transfused with 2 units PRBC on 7/9 with appropriate response. GI consulted and underwent EGD and colonoscopy today which did not show any bleeding at the time, though prep was poor. Capsule study on 7/14 was normal. H/H has remained stable, on the day of discharge 8.4/27.6 Hemodynamically remains stable, however while admitted was noted to have multiple episodes of NSVT, which was prolonged and associated with a low flow alarm on 7/12 and 7/14 (now resolved). Patient has progressed well and will be discharged home. Will have labs completed as outpatient.

## 2020-07-16 NOTE — PROGRESS NOTE ADULT - SUBJECTIVE AND OBJECTIVE BOX
Subjective " hello i am feeling well"    VITAL SIGNS    Telemetry:   NSR     Vital Signs Last 24 Hrs  T(C): 36.6 (20 @ 10:24), Max: 37.1 (07-15-20 @ 18:05)  T(F): 97.9 (20 @ 10:24), Max: 98.7 (07-15-20 @ 18:05)  HR: 77 (20 @ 10:24) (77 - 88)  BP: --  RR: 18 (20 @ 10:24) (18 - 18)  SpO2: 98% (20 @ 10:24) (97% - 98%)           07-15 @ 07:01  -   @ 07:00  --------------------------------------------------------  IN: 837 mL / OUT: 1525 mL / NET: -688 mL     @ 07:01  -   @ 11:38  --------------------------------------------------------  IN: 200 mL / OUT: 600 mL / NET: -400 mL    LVAD 8800   PT/INR - ( 2020 07:08 )   PT: 13.4 sec;   INR: 1.13 ratio         PTT - ( 2020 07:08 )  PTT:46.9 sec5.3  5.1  6.2  16    131<L>  |  99  |  15  ----------------------------<  101<H>  4.2   |  22  |  1.00    Ca    9.0      2020 07:07    TPro  6.8  /  Alb  3.6  /  TBili  0.2  /  DBili  x   /  AST  14  /  ALT  10  /  AlkPhos  57  07-15                        7.9    8.20  )-----------( 239      ( 2020 07:08 )             25.6         Daily Weight in k.7 (16 < from: Capsule Endoscopy (07.15.20 @ 14:17) >  Findings:       Images of the esophagus, stomach and small bowel were obtained from the swallowed capsule and        reviewed.       No significant pathology seen in the examined small bowel. No signs of bleeding seen in the        small intestine.                                                     Impression:          - Normal video capsule endoscopy.  Recommendation:      - [facility].                       - Continue to monitor CBCs and for clinical signs of bleeding                       - No objection to resume anticoagulation as needed with close monitoring,                        since no GI bleeding is found so far.    < end of copied text >  2020 07:31)      MEDICATIONS  (STANDING):  aMIOdarone    Tablet 200 milliGRAM(s) Oral daily  aspirin  chewable 81 milliGRAM(s) Oral daily  finasteride 5 milliGRAM(s) Oral daily  heparin  Infusion 700 Unit(s)/Hr (7 mL/Hr) IV Continuous <Continuous>  lisinopril 5 milliGRAM(s) Oral daily  metoprolol succinate ER 25 milliGRAM(s) Oral daily  mirtazapine 7.5 milliGRAM(s) Oral at bedtime  pantoprazole    Tablet 40 milliGRAM(s) Oral before breakfast  sodium chloride 0.9% lock flush 3 milliLiter(s) IV Push every 8 hours  spironolactone 25 milliGRAM(s) Oral daily  warfarin 3 milliGRAM(s) Oral once    MEDICATIONS  (PRN):  acetaminophen   Tablet .. 650 milliGRAM(s) Oral every 6 hours PRN Temp greater or equal to 38C (100.4F), Moderate Pain (4 - 6)  Coumadin    [ x] YES          [  ]      NO         Reason:   lvad                            PHYSICAL EXAM  Neurology: alert and oriented x 3, nonfocal, no gross deficits    CV : LVAD sounds  driveline CDI    Lungs: B/l cta on room air    Abdomen: soft, nontender, nondistended, positive bowel sounds, last bowel movement 7/15    : voids              Extremities:  equal strength throughout   B/lle warm well perfused                                         Physical Therapy Rec:   Home  [  ]   Home w/ PT  [  ]  Rehab  [  ]    Discussed with Cardiothoracic Team at AM rounds.

## 2020-07-16 NOTE — PROGRESS NOTE ADULT - PROBLEM SELECTOR PLAN 5
- Noted to have uptrend in WBC, with peak WBC at 12.42, now improving  - Blood cultures 7/14 remain NGTD  - UCx 7/14 negative - Noted to have uptrend in WBC, with peak WBC 12.42, now improving  - Blood cultures 7/14 remain NGTD  - UCx 7/14 negative

## 2020-07-16 NOTE — PROGRESS NOTE ADULT - ASSESSMENT
65 y/o male PMH ACC/AHA stage D HF due to NICM HM2 LVAD , TV annuloplasty ring 9/12/17 as destination therapy due to severe peripheral artery disease with significant stenosis,  SIADH, Depression, CKD-3 with hyperkalemia, past E. coli UTIs and recently hospitalized in April due to COVID-19. Patient was readmitted for H/H 5.1/17 and had positive fecal occult with no active GI bleeding. He was transfused with 2 units PRBC on 7/9. H/H now stable at 7.9/25. Patient denies weakness, hematochezia, dizziness, SOB, CP, or confusion. Hemodynamically he remains stable and no LVAD alarms noted.      Hospital Course:   7/10 VSS; GI consulted for Guaiac+, Hemodynamically stable, H&H 7.9/25, no active GI bleeding. Pt denies dizziness, abdominal pain, weakness, or bloody stools. Placed on clear liquid diet. Plan for colonoscopy/endoscopy on Monday. Coumadin for LVAD held today with last INR 2.54. K 3.7 supplemented.   7/11 VSS EGD & colonoscopy in am 7/12 Monday- bowel prep tomorrow   7/12 VSS NPO for EGD/colonoscopy in am - coumadin on hold   7/13 pt npo for EGD/Colonoscopy today  7/14 VSS; colon/endo results negative for active bleeding, Patient planned for capsule study today to assess small bowels. Potassium K3.9 supplemented. WBC trending up (12) --> UA neg, Bcx pending. INR 1.62 --> Coumadin 2mg -emotional support given.   7/15 VVS; Continue with current medication regimen.  Awaiting capsule study results. Leukocytosis improving WBC 9,000. Awaiting BC X 2.   7/16 VSS  Normal capsule study ok for anticoagulation  leukocytosis 8.2 H& H 7.9/25.6  Blood cx NGTD coumadin 3 tonight per  LVAD team

## 2020-07-16 NOTE — DISCHARGE NOTE PROVIDER - NSDCMRMEDTOKEN_GEN_ALL_CORE_FT
amiodarone 200 mg oral tablet: 1 tab(s) orally once a day   aspirin 81 mg oral delayed release tablet: 1 tab(s) orally once a day  Coumadin 5 mg oral tablet: 1 tab(s) orally once a day Goal INR 2-3 followe philomena Mcclendon  [id number]  famotidine 20 mg oral tablet: 1 tab(s) orally once a day  finasteride 5 mg oral tablet: 1 tab(s) orally once a day  lisinopril 5 mg oral tablet: 1 tab(s) orally once a day  metoprolol succinate 25 mg oral tablet, extended release: 1 tab(s) orally once a day  mirtazapine 7.5 mg oral tablet: 1 tab(s) orally once a day (at bedtime) amiodarone 200 mg oral tablet: 1 tab(s) orally once a day   aspirin 81 mg oral delayed release tablet: 1 tab(s) orally once a day  famotidine 20 mg oral tablet: 1 tab(s) orally once a day  finasteride 5 mg oral tablet: 1 tab(s) orally once a day  lisinopril 5 mg oral tablet: 1 tab(s) orally once a day  metoprolol succinate 25 mg oral tablet, extended release: 1 tab(s) orally once a day  mirtazapine 7.5 mg oral tablet: 1 tab(s) orally once a day (at bedtime)  pantoprazole 40 mg oral delayed release tablet: 1 tab(s) orally once a day (before a meal)  Indication: Gi ppx   spironolactone 25 mg oral tablet: 1 tab(s) orally once a day  Indication: heart rate  warfarin 4 mg oral tablet: 1 tab(s) orally once  indication: vad

## 2020-07-17 ENCOUNTER — TRANSCRIPTION ENCOUNTER (OUTPATIENT)
Age: 65
End: 2020-07-17

## 2020-07-17 VITALS — TEMPERATURE: 98 F | HEART RATE: 88 BPM | RESPIRATION RATE: 18 BRPM | OXYGEN SATURATION: 97 %

## 2020-07-17 LAB
ALBUMIN SERPL ELPH-MCNC: 3.9 G/DL — SIGNIFICANT CHANGE UP (ref 3.3–5)
ALP SERPL-CCNC: 61 U/L — SIGNIFICANT CHANGE UP (ref 40–120)
ALT FLD-CCNC: 8 U/L — LOW (ref 10–45)
ANION GAP SERPL CALC-SCNC: 11 MMOL/L — SIGNIFICANT CHANGE UP (ref 5–17)
APTT BLD: 44.7 SEC — HIGH (ref 27.5–35.5)
AST SERPL-CCNC: 15 U/L — SIGNIFICANT CHANGE UP (ref 10–40)
BILIRUB SERPL-MCNC: 0.2 MG/DL — SIGNIFICANT CHANGE UP (ref 0.2–1.2)
BUN SERPL-MCNC: 14 MG/DL — SIGNIFICANT CHANGE UP (ref 7–23)
CALCIUM SERPL-MCNC: 9 MG/DL — SIGNIFICANT CHANGE UP (ref 8.4–10.5)
CHLORIDE SERPL-SCNC: 99 MMOL/L — SIGNIFICANT CHANGE UP (ref 96–108)
CO2 SERPL-SCNC: 23 MMOL/L — SIGNIFICANT CHANGE UP (ref 22–31)
CREAT SERPL-MCNC: 0.99 MG/DL — SIGNIFICANT CHANGE UP (ref 0.5–1.3)
GLUCOSE SERPL-MCNC: 97 MG/DL — SIGNIFICANT CHANGE UP (ref 70–99)
HCT VFR BLD CALC: 27.4 % — LOW (ref 39–50)
HGB BLD-MCNC: 8.4 G/DL — LOW (ref 13–17)
INR BLD: 1.1 RATIO — SIGNIFICANT CHANGE UP (ref 0.88–1.16)
LDH SERPL L TO P-CCNC: 182 U/L — SIGNIFICANT CHANGE UP (ref 50–242)
MCHC RBC-ENTMCNC: 27.7 PG — SIGNIFICANT CHANGE UP (ref 27–34)
MCHC RBC-ENTMCNC: 30.7 GM/DL — LOW (ref 32–36)
MCV RBC AUTO: 90.4 FL — SIGNIFICANT CHANGE UP (ref 80–100)
NRBC # BLD: 0 /100 WBCS — SIGNIFICANT CHANGE UP (ref 0–0)
PLATELET # BLD AUTO: 254 K/UL — SIGNIFICANT CHANGE UP (ref 150–400)
POTASSIUM SERPL-MCNC: 4 MMOL/L — SIGNIFICANT CHANGE UP (ref 3.5–5.3)
POTASSIUM SERPL-SCNC: 4 MMOL/L — SIGNIFICANT CHANGE UP (ref 3.5–5.3)
PROT SERPL-MCNC: 7.2 G/DL — SIGNIFICANT CHANGE UP (ref 6–8.3)
PROTHROM AB SERPL-ACNC: 13 SEC — SIGNIFICANT CHANGE UP (ref 10.6–13.6)
RBC # BLD: 3.03 M/UL — LOW (ref 4.2–5.8)
RBC # FLD: 14.4 % — SIGNIFICANT CHANGE UP (ref 10.3–14.5)
SODIUM SERPL-SCNC: 133 MMOL/L — LOW (ref 135–145)
WBC # BLD: 6.24 K/UL — SIGNIFICANT CHANGE UP (ref 3.8–10.5)
WBC # FLD AUTO: 6.24 K/UL — SIGNIFICANT CHANGE UP (ref 3.8–10.5)

## 2020-07-17 PROCEDURE — 86923 COMPATIBILITY TEST ELECTRIC: CPT

## 2020-07-17 PROCEDURE — 93750 INTERROGATION VAD IN PERSON: CPT

## 2020-07-17 PROCEDURE — 99231 SBSQ HOSP IP/OBS SF/LOW 25: CPT

## 2020-07-17 PROCEDURE — 93306 TTE W/DOPPLER COMPLETE: CPT

## 2020-07-17 PROCEDURE — 80053 COMPREHEN METABOLIC PANEL: CPT

## 2020-07-17 PROCEDURE — 82803 BLOOD GASES ANY COMBINATION: CPT

## 2020-07-17 PROCEDURE — 82947 ASSAY GLUCOSE BLOOD QUANT: CPT

## 2020-07-17 PROCEDURE — 86901 BLOOD TYPING SEROLOGIC RH(D): CPT

## 2020-07-17 PROCEDURE — 83615 LACTATE (LD) (LDH) ENZYME: CPT

## 2020-07-17 PROCEDURE — 87086 URINE CULTURE/COLONY COUNT: CPT

## 2020-07-17 PROCEDURE — 71275 CT ANGIOGRAPHY CHEST: CPT

## 2020-07-17 PROCEDURE — 84295 ASSAY OF SERUM SODIUM: CPT

## 2020-07-17 PROCEDURE — 84132 ASSAY OF SERUM POTASSIUM: CPT

## 2020-07-17 PROCEDURE — 85014 HEMATOCRIT: CPT

## 2020-07-17 PROCEDURE — 85730 THROMBOPLASTIN TIME PARTIAL: CPT

## 2020-07-17 PROCEDURE — 74174 CTA ABD&PLVS W/CONTRAST: CPT

## 2020-07-17 PROCEDURE — 85027 COMPLETE CBC AUTOMATED: CPT

## 2020-07-17 PROCEDURE — 80048 BASIC METABOLIC PNL TOTAL CA: CPT

## 2020-07-17 PROCEDURE — U0003: CPT

## 2020-07-17 PROCEDURE — 83605 ASSAY OF LACTIC ACID: CPT

## 2020-07-17 PROCEDURE — 82272 OCCULT BLD FECES 1-3 TESTS: CPT

## 2020-07-17 PROCEDURE — 71045 X-RAY EXAM CHEST 1 VIEW: CPT

## 2020-07-17 PROCEDURE — 93005 ELECTROCARDIOGRAM TRACING: CPT

## 2020-07-17 PROCEDURE — 86850 RBC ANTIBODY SCREEN: CPT

## 2020-07-17 PROCEDURE — 99285 EMERGENCY DEPT VISIT HI MDM: CPT

## 2020-07-17 PROCEDURE — 82330 ASSAY OF CALCIUM: CPT

## 2020-07-17 PROCEDURE — 81003 URINALYSIS AUTO W/O SCOPE: CPT

## 2020-07-17 PROCEDURE — 99233 SBSQ HOSP IP/OBS HIGH 50: CPT | Mod: 25

## 2020-07-17 PROCEDURE — 87040 BLOOD CULTURE FOR BACTERIA: CPT

## 2020-07-17 PROCEDURE — 83735 ASSAY OF MAGNESIUM: CPT

## 2020-07-17 PROCEDURE — 86900 BLOOD TYPING SEROLOGIC ABO: CPT

## 2020-07-17 PROCEDURE — 82435 ASSAY OF BLOOD CHLORIDE: CPT

## 2020-07-17 PROCEDURE — 85610 PROTHROMBIN TIME: CPT

## 2020-07-17 PROCEDURE — P9016: CPT

## 2020-07-17 RX ORDER — PANTOPRAZOLE SODIUM 20 MG/1
1 TABLET, DELAYED RELEASE ORAL
Qty: 90 | Refills: 3
Start: 2020-07-17 | End: 2021-01-01

## 2020-07-17 RX ORDER — WARFARIN SODIUM 2.5 MG/1
4 TABLET ORAL ONCE
Refills: 0 | Status: DISCONTINUED | OUTPATIENT
Start: 2020-07-17 | End: 2020-07-17

## 2020-07-17 RX ORDER — WARFARIN SODIUM 2.5 MG/1
1 TABLET ORAL
Qty: 90 | Refills: 3
Start: 2020-07-17 | End: 2021-01-01

## 2020-07-17 RX ORDER — SPIRONOLACTONE 25 MG/1
1 TABLET, FILM COATED ORAL
Qty: 90 | Refills: 3
Start: 2020-07-17 | End: 2021-01-01

## 2020-07-17 RX ORDER — WARFARIN SODIUM 2.5 MG/1
3 TABLET ORAL ONCE
Refills: 0 | Status: DISCONTINUED | OUTPATIENT
Start: 2020-07-17 | End: 2020-07-17

## 2020-07-17 RX ADMIN — HEPARIN SODIUM 7 UNIT(S)/HR: 5000 INJECTION INTRAVENOUS; SUBCUTANEOUS at 08:27

## 2020-07-17 RX ADMIN — SPIRONOLACTONE 25 MILLIGRAM(S): 25 TABLET, FILM COATED ORAL at 06:28

## 2020-07-17 RX ADMIN — Medication 81 MILLIGRAM(S): at 12:15

## 2020-07-17 NOTE — PROGRESS NOTE ADULT - ATTENDING COMMENTS
Patient seen and examined. Agree with above.
Agree with above. No signs of bleeding on EGD/colonoscopy/capsule endoscopy. Given high risks of thrombotic events, would resume anticoagulation with careful monitoring as benefits of anticoagulation outweigh risks.
Occasional NSVT but improved from prior in setting of K 3.7. Will start spironolactone 25 mg daily to help maintain K > 4.0 without supplementation and watch renal function.    Underwent unremarkable EGD/c-scope though c-scope prep was poor. VCE to be placed tomorrow    Resume coumadin with goal INR 1.8-2.5 and ASA given no overt bleeding and no prior bleeding.
Hemoglobin remains stable and no further NSVT. Will start heparin drip given subtherapeutic INR and watch for signs of bleeding in the hospital. Continue coumadin for goal INR 2-2.5 and restart ASA 81 mg daily. Pan-culture given rising WBC and low grade temperatures
There has been no evidence of bleeding based on EGD, c-scope (albeit poor prep), and VCE. Hgb has been stable since transfusion on admission. There are no active LVAD issues. Discussed with multidisciplinary group with decision that is reasonable to discharge without bridging to therapeutic INR. Increasing coumadin to 4 mg daily and checking INR and CBC on Monday with VAD clinic followup on Thursday.
There has been no evidence of bleeding based on EGD, c-scope (albeit poor prep), and VCE. Hgb has been stable since transfusion on admission. Will continue heparin infusion and discharge when INR therapeutic or near therapeutic. There are no active LVAD issues.

## 2020-07-17 NOTE — PROGRESS NOTE ADULT - PROBLEM SELECTOR PROBLEM 1
LVAD (left ventricular assist device) present
Acute gastrointestinal hemorrhage
LVAD (left ventricular assist device) present

## 2020-07-17 NOTE — PROGRESS NOTE ADULT - ASSESSMENT
65 y/o male PMH ACC/AHA stage D HF due to NICM HM2 LVAD , TV annuloplasty ring 9/12/17 as destination therapy due to severe peripheral artery disease with significant stenosis,  SIADH, Depression, CKD-3 with hyperkalemia, past E. coli UTIs and recently hospitalized in April due to COVID-19. Patient was readmitted for H/H 5.1/17 and had positive fecal occult. He was transfused with 2 units PRBC on 7/9 with appropriate response. GI consulted and underwent EGD and colonoscopy today which did not show any bleeding at the time, though prep was poor. Capsule study on 7/14 was normal. Hemodynamically remains stable, however, he has multiple episodes of NSVT, which was prolonged and associated with a low flow alarm on 7/12 and 7/14. Patient remains on 2Cohen and will likely be discharged home later today given that H/H has remained stable. 63 y/o male PMH ACC/AHA stage D HF due to NICM HM2 LVAD , TV annuloplasty ring 9/12/17 as destination therapy due to severe peripheral artery disease with significant stenosis,  SIADH, Depression, CKD-3 with hyperkalemia, past E. coli UTIs and recently hospitalized in April due to COVID-19. Patient was readmitted for H/H 5.1/17 and had positive fecal occult. He was transfused with 2 units PRBC on 7/9 with appropriate response. GI consulted and underwent EGD and colonoscopy today which did not show any bleeding at the time, though prep was poor. Capsule study on 7/14 was normal. Hemodynamically remains stable, however, he has multiple episodes of NSVT, which was prolonged and associated with a low flow alarm on 7/12 and 7/14. Patient remains on 2Cohen and will be discharged home later today given that H/H has remained stable.

## 2020-07-17 NOTE — PROGRESS NOTE ADULT - SUBJECTIVE AND OBJECTIVE BOX
VITAL SIGNS    Telemetry:    Vital Signs Last 24 Hrs  T(C): 36.7 (20 @ 10:52), Max: 37.1 (20 @ 21:27)  T(F): 98.1 (20 @ 10:52), Max: 98.7 (20 @ 21:27)  HR: 88 (20 @ 10:52) (77 - 88)  BP: --  RR: 18 (20 @ 10:52) (18 - 18)  SpO2: 97% (20 @ 10:52) (97% - 99%)             @ 07:01  -   @ 07:00  --------------------------------------------------------  IN: 1255 mL / OUT: 1300 mL / NET: -45 mL     @ 07:01  -   @ 11:25  --------------------------------------------------------  IN: 247 mL / OUT: 200 mL / NET: 47 mL       Daily     Daily Weight in k.2 (2020 08:04)  Admit Wt: Drug Dosing Weight  Height (cm): 154.9 (2020 10:01)  Weight (kg): 75.1 (2020 16:30)  BMI (kg/m2): 31.3 (2020 16:30)  BSA (m2): 1.74 (2020 16:30)     Daily Weight in k.2 (20 @ 08:04), Weight in k.6 (20 @ 15:30)    LABS      133<L>  |  99  |  14  ----------------------------<  97  4.0   |  23  |  0.99    Ca    9.0      2020 06:02    TPro  7.2  /  Alb  3.9  /  TBili  0.2  /  DBili  x   /  AST  15  /  ALT  8<L>  /  AlkPhos  61                                   8.4    6.24  )-----------( 254      ( 2020 06:02 )             27.4          PT/INR - ( 2020 06:02 )   PT: 13.0 sec;   INR: 1.10 ratio         PTT - ( 2020 06:02 )  PTT:44.7 sec        Bilirubin Total, Serum: 0.2 mg/dL ( @ 06:02)    CAPILLARY BLOOD GLUCOSE              Drains:     MS       [  ]   [  ]            L Pleural [  ]            R Pleural  [  ]            ROMY  [  ]           Landrum  [  ]    Pacing Wires      [  ]   Settings:                                  Isolated  [  ]                    CXR:      MEDICATIONS  acetaminophen   Tablet .. 650 milliGRAM(s) Oral every 6 hours PRN  aMIOdarone    Tablet 200 milliGRAM(s) Oral daily  aspirin  chewable 81 milliGRAM(s) Oral daily  finasteride 5 milliGRAM(s) Oral daily  heparin  Infusion 700 Unit(s)/Hr IV Continuous <Continuous>  lisinopril 5 milliGRAM(s) Oral daily  metoprolol succinate ER 25 milliGRAM(s) Oral daily  mirtazapine 7.5 milliGRAM(s) Oral at bedtime  pantoprazole    Tablet 40 milliGRAM(s) Oral before breakfast  sodium chloride 0.9% lock flush 3 milliLiter(s) IV Push every 8 hours  spironolactone 25 milliGRAM(s) Oral daily      PHYSICAL EXAM      Neurology: alert and oriented x 3, nonfocal, no gross deficits  CV :LVAD HUM  Sternal Wound :  CDI , Stable  Lungs: cta   Abdomen: soft, nontender, nondistended, positive bowel sounds, last bowel movement   :     voids  Extremities:   warm to touch               PAST MEDICAL & SURGICAL HISTORY:  History of 2019 novel coronavirus disease (COVID-19): 2020  Pleural effusion  Depression  CAD (coronary artery disease)  CHF (congestive heart failure)  S/P ventricular assist device  S/P TVR (tricuspid valve repair)                 Discussed with Cardiothoracic Team at AM rounds.

## 2020-07-17 NOTE — PROGRESS NOTE ADULT - PROBLEM SELECTOR PLAN 5
- Noted to have uptrend in WBC, with peak WBC 12.42, now improving  - Blood cultures 7/14 remain NGTD  - UCx 7/14 negative

## 2020-07-17 NOTE — PROGRESS NOTE ADULT - PROBLEM SELECTOR PLAN 2
Readmit for anemia and elev INR --> S/P @ PRBC x 2 Units for H/H 5.1/17  +Fecal occult stool with no active GI bleeding  EGD & colonoscopy 7/13 NEG  H&H stable 8/25, Patient asymptomatic, Hemodynamically stable   GI consulted and following  7/15 Capsule study - normal study no contraindication to anticoagulation

## 2020-07-17 NOTE — PROGRESS NOTE ADULT - PROBLEM SELECTOR PLAN 2
- Continue at current speed @ 8800. Noted to have one low flow alarm on 7/12.  - Continue ASA 81 mg PO daily  - Continue on heparin gtt (PTT goal 60-80) bridge to Coumadin (INR goal 2-2.5). Will dose Coumadin 4 mg tonight. - Continue at current speed @ 8800. Noted to have one low flow alarm on 7/12.  - Continue ASA 81 mg PO daily  - Will be discharged having subtherapeutic INR. Will continue with  Coumadin 4 mg. Next INR check on Monday 7/20

## 2020-07-17 NOTE — DISCHARGE NOTE NURSING/CASE MANAGEMENT/SOCIAL WORK - NSDCFUADDAPPT_GEN_ALL_CORE_FT
- Will follow up with LVAD Coordinator on Thursday July 23rd at 830 am  - Next INR check will be on Monday 7/20. Will have CBC completed at same time.

## 2020-07-17 NOTE — PROGRESS NOTE ADULT - PROBLEM SELECTOR PROBLEM 2
Anemia
LVAD (left ventricular assist device) present
Anemia

## 2020-07-17 NOTE — PROGRESS NOTE ADULT - ASSESSMENT
63 y/o male PMH ACC/AHA stage D HF due to NICM HM2 LVAD , TV annuloplasty ring 9/12/17 as destination therapy due to severe peripheral artery disease with significant stenosis,  SIADH, Depression, CKD-3 with hyperkalemia, past E. coli UTIs and recently hospitalized in April due to COVID-19. Patient was readmitted for H/H 5.1/17 and had positive fecal occult with no active GI bleeding. He was transfused with 2 units PRBC on 7/9. H/H now stable at 7.9/25. Patient denies weakness, hematochezia, dizziness, SOB, CP, or confusion. Hemodynamically he remains stable and no LVAD alarms noted.      Hospital Course:   7/10 VSS; GI consulted for Guaiac+, Hemodynamically stable, H&H 7.9/25, no active GI bleeding. Pt denies dizziness, abdominal pain, weakness, or bloody stools. Placed on clear liquid diet. Plan for colonoscopy/endoscopy on Monday. Coumadin for LVAD held today with last INR 2.54. K 3.7 supplemented.   7/11 VSS EGD & colonoscopy in am 7/12 Monday- bowel prep tomorrow   7/12 VSS NPO for EGD/colonoscopy in am - coumadin on hold   7/13 pt npo for EGD/Colonoscopy today  7/14 VSS; colon/endo results negative for active bleeding, Patient planned for capsule study today to assess small bowels. Potassium K3.9 supplemented. WBC trending up (12) --> UA neg, Bcx pending. INR 1.62 --> Coumadin 2mg -emotional support given.   7/15 VVS; Continue with current medication regimen.  Awaiting capsule study results. Leukocytosis improving WBC 9,000. Awaiting BC X 2.   7/16 VSS  Normal capsule study ok for anticoagulation  leukocytosis 8.2 H& H 7.9/25.6  Blood cx NGTD coumadin 3 tonight per  LVAD team  7/17: No events overnight dose coumadin home when INR stable

## 2020-07-17 NOTE — DISCHARGE NOTE NURSING/CASE MANAGEMENT/SOCIAL WORK - PATIENT PORTAL LINK FT
You can access the FollowMyHealth Patient Portal offered by [facility] by registering at the following website: [email or web address]. By joining [employer] FollowMyHealth portal, you will also be able to view your health information using other applications (apps) compatible with our system.

## 2020-07-17 NOTE — PROGRESS NOTE ADULT - SUBJECTIVE AND OBJECTIVE BOX
Subjective: Patient seen and examined resting in bed. ON no issues. Overall progressing well.     Medications:  acetaminophen   Tablet .. 650 milliGRAM(s) Oral every 6 hours PRN  aMIOdarone    Tablet 200 milliGRAM(s) Oral daily  aspirin  chewable 81 milliGRAM(s) Oral daily  finasteride 5 milliGRAM(s) Oral daily  heparin  Infusion 700 Unit(s)/Hr IV Continuous <Continuous>  lisinopril 5 milliGRAM(s) Oral daily  metoprolol succinate ER 25 milliGRAM(s) Oral daily  mirtazapine 7.5 milliGRAM(s) Oral at bedtime  pantoprazole    Tablet 40 milliGRAM(s) Oral before breakfast  sodium chloride 0.9% lock flush 3 milliLiter(s) IV Push every 8 hours  spironolactone 25 milliGRAM(s) Oral daily      Vitals:  Vital Signs Last 24 Hours  T(C): 36.7 (20 @ 06:25), Max: 37.1 (20 @ 21:27)  HR: 87 (20 @ 06:25) (77 - 88)  BP: --  RR: 18 (20 @ 06:25) (18 - 18)  SpO2: 99% (20 @ 06:25) (98% - 99%)    Weight in k.6 ( @ 15:30)    I&O's Summary    2020 07:01  -  2020 07:00  --------------------------------------------------------  IN: 1248 mL / OUT: 1300 mL / NET: -52 mL        Physical Exam  General: No distress. Comfortable.  HEENT: EOM intact.  Neck: Neck supple. JVP not elevated. No masses  Chest: Clear to auscultation bilaterally  CV: +VAD hum  Abdomen: Soft, non-distended, non-tender  Skin: No rashes or skin breakdown  Neurology: Alert and oriented times three. Sensation intact  Psych: Affect normal    LVAD Interrogation  VAD TYPE HM 2  Speed 8800  Flow 4.2  Power 4.6  PI 6.9  Assessment of driveline exit site: driveline dressing c/d/i  Programming changes: no programing changes made     Labs:                        8.4    6.24  )-----------( 254      ( 2020 06:02 )             27.4     07-    133<L>  |  99  |  14  ----------------------------<  97  4.0   |  23  |  0.99    Ca    9.0      2020 06:02    TPro  7.2  /  Alb  3.9  /  TBili  0.2  /  DBili  x   /  AST  15  /  ALT  8<L>  /  AlkPhos  61  07-17    PT/INR - ( 2020 06:02 )   PT: 13.0 sec;   INR: 1.10 ratio         PTT - ( 2020 06:02 )  PTT:44.7 sec      Lactate Dehydrogenase, Serum: 182 U/L ( @ 06:02)  Lactate Dehydrogenase, Serum: 192 U/L ( @ 07:07)  Lactate Dehydrogenase, Serum: 210 U/L (07-15 @ 03:18)

## 2020-07-17 NOTE — PROGRESS NOTE ADULT - PROBLEM SELECTOR PLAN 1
- H/H stable, currently H/H 7.9/25.6. Continue to transfuse as needed  - GI following, appreciate recommendations  - Underwent EGD and colonoscopy 7/13, no signs of bleeding however noted to have dark stool. Capsule study 7/14 normal

## 2020-07-17 NOTE — PROGRESS NOTE ADULT - PROVIDER SPECIALTY LIST ADULT
CT Surgery
Gastroenterology
Heart Failure
CT Surgery
CT Surgery

## 2020-07-19 LAB
CULTURE RESULTS: SIGNIFICANT CHANGE UP
SPECIMEN SOURCE: SIGNIFICANT CHANGE UP

## 2020-07-20 ENCOUNTER — RX RENEWAL (OUTPATIENT)
Age: 65
End: 2020-07-20

## 2020-07-20 LAB
CULTURE RESULTS: SIGNIFICANT CHANGE UP
SPECIMEN SOURCE: SIGNIFICANT CHANGE UP

## 2020-07-22 RX ORDER — FAMOTIDINE 20 MG/1
20 TABLET, FILM COATED ORAL DAILY
Qty: 90 | Refills: 3 | Status: DISCONTINUED | COMMUNITY
Start: 2020-03-16 | End: 2020-07-22

## 2020-07-22 RX ORDER — ASPIRIN ENTERIC COATED TABLETS 81 MG 81 MG/1
81 TABLET, DELAYED RELEASE ORAL
Qty: 30 | Refills: 11 | Status: DISCONTINUED | COMMUNITY
Start: 2018-10-04 | End: 2020-07-22

## 2020-07-23 ENCOUNTER — APPOINTMENT (OUTPATIENT)
Dept: CARDIOTHORACIC SURGERY | Facility: CLINIC | Age: 65
End: 2020-07-23
Payer: MEDICARE

## 2020-07-23 ENCOUNTER — LABORATORY RESULT (OUTPATIENT)
Age: 65
End: 2020-07-23

## 2020-07-23 ENCOUNTER — APPOINTMENT (OUTPATIENT)
Dept: HEART FAILURE | Facility: CLINIC | Age: 65
End: 2020-07-23
Payer: MEDICARE

## 2020-07-23 ENCOUNTER — NON-APPOINTMENT (OUTPATIENT)
Age: 65
End: 2020-07-23

## 2020-07-23 VITALS
RESPIRATION RATE: 12 BRPM | BODY MASS INDEX: 24.62 KG/M2 | TEMPERATURE: 98.4 F | WEIGHT: 172 LBS | SYSTOLIC BLOOD PRESSURE: 92 MMHG | HEART RATE: 86 BPM | DIASTOLIC BLOOD PRESSURE: 61 MMHG | OXYGEN SATURATION: 100 % | HEIGHT: 70 IN

## 2020-07-23 LAB
BASOPHILS # BLD AUTO: 0.4 K/UL
BASOPHILS NFR BLD AUTO: 6.1 %
EOSINOPHIL # BLD AUTO: 0.17 K/UL
EOSINOPHIL NFR BLD AUTO: 2.6 %
HCT VFR BLD CALC: 26.6 %
HGB BLD-MCNC: 8 G/DL
INR PPP: 1.28 RATIO
LDH SERPL-CCNC: 227 U/L
LYMPHOCYTES # BLD AUTO: 0.87 K/UL
LYMPHOCYTES NFR BLD AUTO: 13.2 %
MAN DIFF?: NORMAL
MCHC RBC-ENTMCNC: 26.8 PG
MCHC RBC-ENTMCNC: 30.1 GM/DL
MCV RBC AUTO: 89.3 FL
MONOCYTES # BLD AUTO: 0.52 K/UL
MONOCYTES NFR BLD AUTO: 7.9 %
NEUTROPHILS # BLD AUTO: 4.61 K/UL
NEUTROPHILS NFR BLD AUTO: 70.2 %
NT-PROBNP SERPL-MCNC: 142 PG/ML
PLATELET # BLD AUTO: 337 K/UL
PT BLD: 15.1 SEC
RBC # BLD: 2.98 M/UL
RBC # FLD: 15.2 %
T3 SERPL-MCNC: 82 NG/DL
T4 SERPL-MCNC: 10.9 UG/DL
TSH SERPL-ACNC: 1.86 UIU/ML
WBC # FLD AUTO: 6.56 K/UL

## 2020-07-23 PROCEDURE — 99214 OFFICE O/P EST MOD 30 MIN: CPT

## 2020-07-23 PROCEDURE — 93000 ELECTROCARDIOGRAM COMPLETE: CPT

## 2020-07-23 PROCEDURE — ZZZZZ: CPT

## 2020-07-23 PROCEDURE — 93750 INTERROGATION VAD IN PERSON: CPT

## 2020-07-23 NOTE — PHYSICAL EXAM
[General Appearance - Well Developed] : well developed [Normal Conjunctiva] : the conjunctiva exhibited no abnormalities [General Appearance - Well Nourished] : well nourished [Normal Oral Mucosa] : normal oral mucosa [Normal Jugular Venous V Waves Present] : normal jugular venous V waves present [Respiration, Rhythm And Depth] : normal respiratory rhythm and effort [Heart Rate And Rhythm] : heart rate and rhythm were normal [Auscultation Breath Sounds / Voice Sounds] : lungs were clear to auscultation bilaterally [Bowel Sounds] : normal bowel sounds [Abdomen Soft] : soft [Abnormal Walk] : normal gait [Abdomen Tenderness] : non-tender [Nail Clubbing] : no clubbing of the fingernails [Cyanosis, Localized] : no localized cyanosis [Petechial Hemorrhages (___cm)] : no petechial hemorrhages [Skin Color & Pigmentation] : normal skin color and pigmentation [] : no rash [Oriented To Time, Place, And Person] : oriented to person, place, and time [No Anxiety] : not feeling anxious [FreeTextEntry1] : +hum of LVAD

## 2020-07-23 NOTE — HISTORY OF PRESENT ILLNESS
[FreeTextEntry1] : Mr. Quispe is a 64 year old man with ACC/AHA stage D systolic heart failure due to a nonischemic cardiomyopathy who presented with abdominal pain and cardiogenic shock in the fall of 2017. Due to inotropic dependence, he underwent HM2 LVAD implantation as destination therapy with TV annuloplasty ring on 9/12/2017. His post-operative course was largely uncomplicated. He had mild rectal bleeding due to internal hemorrhoids. He had one readmission due to infection of unclear etiology in December 2017. It was not thought to be related to the LVAD. His history is also notable for peripheral arterial disease with claudication, which is a contributing factor in the decision to not list him for heart transplantation. \par Patient was admitted to the hospital from  March 31 through 4/20 with COVID + was having low bp and meds were discontinued.\par He was discharged to home on 4/20 and on Tuesday 4/21 he was back at the hospital after a fall with a laceration to his Left eye and a hematoma. His BP was low and meds adjusted. He was again discharged to home on 4/24.\par Today,  \par He reports he is overall better. He is not dizzy. He has no dyspnea. He denies PND or orthopnea. He has no blood in his stools or urine. He denies LVAD alarms. He states he can now walk 10 city blocks without pain in his legs.\par Patient went to the ED (7/9) with fatigue and lightheadedness his H/H 5/17 he was admitted received 2 UPRBC, Endoscopy and Colonoscopy negative for a bleed. He was discharged on 7/17.\par Here today for post discharge visit: He still is fatigued, H/H 8/26 which is stable. Driveline CDI\par \par LVAD Interrogation:\par Device Type: HM2\par Speed: 8800\par Flow: 5.9\par Power: 5.4\par PI: 6.0\par MAP: 74 (BP 92/62 )\par EKG: NST HR 78 no ectopy \par Back up battery: Primary Battery -583 Expires in 21 months \par Driveline dressing change: C/D/I

## 2020-07-24 LAB
ALBUMIN SERPL ELPH-MCNC: 4.4 G/DL
ALP BLD-CCNC: 64 U/L
ALT SERPL-CCNC: 12 U/L
ANION GAP SERPL CALC-SCNC: 15 MMOL/L
AST SERPL-CCNC: 20 U/L
BILIRUB SERPL-MCNC: <0.2 MG/DL
BUN SERPL-MCNC: 14 MG/DL
CALCIUM SERPL-MCNC: 8.9 MG/DL
CHLORIDE SERPL-SCNC: 100 MMOL/L
CO2 SERPL-SCNC: 21 MMOL/L
CREAT SERPL-MCNC: 1.17 MG/DL
GLUCOSE SERPL-MCNC: 106 MG/DL
POTASSIUM SERPL-SCNC: 4.7 MMOL/L
PROT SERPL-MCNC: 7.5 G/DL
SODIUM SERPL-SCNC: 135 MMOL/L

## 2020-07-28 ENCOUNTER — NON-APPOINTMENT (OUTPATIENT)
Age: 65
End: 2020-07-28

## 2020-08-06 PROBLEM — Z86.19 PERSONAL HISTORY OF OTHER INFECTIOUS AND PARASITIC DISEASES: Chronic | Status: ACTIVE | Noted: 2020-07-09

## 2020-08-13 ENCOUNTER — NON-APPOINTMENT (OUTPATIENT)
Age: 65
End: 2020-08-13

## 2020-08-13 ENCOUNTER — APPOINTMENT (OUTPATIENT)
Dept: HEART FAILURE | Facility: CLINIC | Age: 65
End: 2020-08-13
Payer: MEDICARE

## 2020-08-13 VITALS
BODY MASS INDEX: 22.62 KG/M2 | TEMPERATURE: 97.7 F | DIASTOLIC BLOOD PRESSURE: 66 MMHG | RESPIRATION RATE: 12 BRPM | WEIGHT: 158 LBS | HEART RATE: 75 BPM | HEIGHT: 70 IN | SYSTOLIC BLOOD PRESSURE: 96 MMHG | OXYGEN SATURATION: 100 %

## 2020-08-13 DIAGNOSIS — K62.5 HEMORRHAGE OF ANUS AND RECTUM: ICD-10-CM

## 2020-08-13 DIAGNOSIS — D50.9 IRON DEFICIENCY ANEMIA, UNSPECIFIED: ICD-10-CM

## 2020-08-13 LAB
ALBUMIN SERPL ELPH-MCNC: 4.5 G/DL
ALP BLD-CCNC: 61 U/L
ALT SERPL-CCNC: 10 U/L
ANION GAP SERPL CALC-SCNC: 9 MMOL/L
AST SERPL-CCNC: 20 U/L
BASOPHILS # BLD AUTO: 0.06 K/UL
BASOPHILS NFR BLD AUTO: 1.3 %
BILIRUB SERPL-MCNC: 0.2 MG/DL
BUN SERPL-MCNC: 20 MG/DL
CALCIUM SERPL-MCNC: 9.7 MG/DL
CHLORIDE SERPL-SCNC: 98 MMOL/L
CO2 SERPL-SCNC: 25 MMOL/L
CREAT SERPL-MCNC: 1.44 MG/DL
EOSINOPHIL # BLD AUTO: 0.1 K/UL
EOSINOPHIL NFR BLD AUTO: 2.1 %
GLUCOSE SERPL-MCNC: 70 MG/DL
HCT VFR BLD CALC: 26.3 %
HGB BLD-MCNC: 7.7 G/DL
IMM GRANULOCYTES NFR BLD AUTO: 0.2 %
LYMPHOCYTES # BLD AUTO: 1.03 K/UL
LYMPHOCYTES NFR BLD AUTO: 21.7 %
MAN DIFF?: NORMAL
MCHC RBC-ENTMCNC: 24.1 PG
MCHC RBC-ENTMCNC: 29.3 GM/DL
MCV RBC AUTO: 82.2 FL
MONOCYTES # BLD AUTO: 0.51 K/UL
MONOCYTES NFR BLD AUTO: 10.8 %
NEUTROPHILS # BLD AUTO: 3.03 K/UL
NEUTROPHILS NFR BLD AUTO: 63.9 %
PLATELET # BLD AUTO: 199 K/UL
POTASSIUM SERPL-SCNC: 4.5 MMOL/L
PROT SERPL-MCNC: 7.7 G/DL
RBC # BLD: 3.2 M/UL
RBC # FLD: 17.3 %
SODIUM SERPL-SCNC: 132 MMOL/L
WBC # FLD AUTO: 4.74 K/UL

## 2020-08-13 PROCEDURE — 99214 OFFICE O/P EST MOD 30 MIN: CPT | Mod: 25

## 2020-08-13 PROCEDURE — 93750 INTERROGATION VAD IN PERSON: CPT

## 2020-08-13 NOTE — DISCUSSION/SUMMARY
[___ Month(s)] : [unfilled] month(s) [FreeTextEntry1] : - LVAD: continue speed at 8800. Appears to be pulsatilie.  \par - Congestive heart failure: Lisinopril 5 mg once/day and Metoprolol at currently doses. \par - Coumadin: Monitor INR closely and adjust warfarin accordingly, INR goal is 2.0-2.5. \par - Anemia: started on Iron, Folic acid, Vit. B12 and senna\par - Antiplatelets: Continue aspirin at 81mg every day. \par - PAD: Ongoing treatment by Dr. Lovelace. Since improved, no indication for revascularization\par - CKD stage III with hyperkalemia; improved from prior \par - Clavicle FX: F/U with ortho phone number given\par - Driveline drainage noted will continue to follow.\par - Education of LVAD system maintenance was reviewed with patient.\par - Supplies: [place]

## 2020-08-13 NOTE — ASSESSMENT
Patient arrives to [facility]. Psych Associate explains process and gives patient urine cup. Patient told about meeting with Mental Health  and Psychiatrist. Patient told about 2-5 hour time frame for complete evaluation.    [FreeTextEntry1] : Mr. 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 He is euvolemic and normotensive. His LV is well decompressed with overall improvement in LV size and systolic function. His aortic valve does not open, but he does not have significant AI. His claudication is improved compared with prior. He is optimized and cleared from cardiovascular standpoint to undergo cataract surgery and does not require any further workup prior to surgery.

## 2020-08-13 NOTE — REVIEW OF SYSTEMS
[Shortness Of Breath] : shortness of breath [Abdominal Pain] : abdominal pain [Change In The Stool] : change in stool [Negative] : Heme/Lymph

## 2020-08-13 NOTE — PHYSICAL EXAM
[General Appearance - Well Developed] : well developed [General Appearance - Well Nourished] : well nourished [Normal Oral Mucosa] : normal oral mucosa [Respiration, Rhythm And Depth] : normal respiratory rhythm and effort [Auscultation Breath Sounds / Voice Sounds] : lungs were clear to auscultation bilaterally [Heart Rate And Rhythm] : heart rate and rhythm were normal [Bowel Sounds] : normal bowel sounds [Abdomen Soft] : soft [Abdomen Tenderness] : non-tender [Abnormal Walk] : normal gait [Nail Clubbing] : no clubbing of the fingernails [Cyanosis, Localized] : no localized cyanosis [Skin Color & Pigmentation] : normal skin color and pigmentation [] : no rash [Oriented To Time, Place, And Person] : oriented to person, place, and time [No Anxiety] : not feeling anxious [FreeTextEntry1] : +hum of LVAD

## 2020-08-13 NOTE — HISTORY OF PRESENT ILLNESS
[FreeTextEntry1] : Mr. Quispe is a 64 year old man with ACC/AHA stage D systolic heart failure due to a nonischemic cardiomyopathy who presented with abdominal pain and cardiogenic shock in the fall of 2017. Due to inotropic dependence, he underwent HM2 LVAD implantation as destination therapy with TV annuloplasty ring on 9/12/2017. His post-operative course was largely uncomplicated. He had mild rectal bleeding due to internal hemorrhoids. He had one readmission due to infection of unclear etiology in December 2017. It was not thought to be related to the LVAD. His history is also notable for peripheral arterial disease with claudication, which is a contributing factor in the decision to not list him for heart transplantation. \par Patient was admitted to the hospital from  March 31 through 4/20 with COVID + was having low bp and meds were discontinued.\par He was discharged to home on 4/20 and on Tuesday 4/21 he was back at the hospital after a fall with a laceration to his Left eye and a hematoma. His BP was low and meds adjusted. He was again discharged to home on 4/24.\par Patient went to the ED (7/9) with fatigue and lightheadedness his H/H 5/17 he was admitted received 2 Units PRBC, Endoscopy and Colonoscopy negative for a bleed. He was discharged on 7/17.\par \par Here today for follow up visit: He still is fatigued, H/H 7.7/26.3 which is stable. Driveline CDI. Today,  \par He denies LVAD alarms. He states he can now walk 10 city blocks without pain in his legs.\par He states that on 8/8 he had BRBPR x1 but has subsequently had brown stool. He does complain of constipation\par \par LVAD Interrogation:\par Device Type: HM2\par Speed: 8800\par Flow: 5.1\par Power: 5.0\par PI: 6.6\par MAP: 94 with doppler (BP 96/66; MAP: 76 )\par EKG: NST HR 76 no ectopy \par Back up battery: Primary Battery -302 Expires in 20 months \par Driveline dressing change: incision without erythema , swelling or drainage but dressing did have a small amount of drainage noted on it.Unable to express any drainage

## 2020-08-20 ENCOUNTER — LABORATORY RESULT (OUTPATIENT)
Age: 65
End: 2020-08-20

## 2020-08-21 ENCOUNTER — APPOINTMENT (OUTPATIENT)
Dept: DISASTER EMERGENCY | Facility: CLINIC | Age: 65
End: 2020-08-21

## 2020-08-21 DIAGNOSIS — Z01.818 ENCOUNTER FOR OTHER PREPROCEDURAL EXAMINATION: ICD-10-CM

## 2020-08-24 ENCOUNTER — APPOINTMENT (OUTPATIENT)
Dept: PULMONOLOGY | Facility: CLINIC | Age: 65
End: 2020-08-24
Payer: MEDICARE

## 2020-08-24 PROCEDURE — 94726 PLETHYSMOGRAPHY LUNG VOLUMES: CPT

## 2020-08-24 PROCEDURE — 94010 BREATHING CAPACITY TEST: CPT

## 2020-08-24 PROCEDURE — 94729 DIFFUSING CAPACITY: CPT

## 2020-08-24 NOTE — PROGRESS NOTE ADULT - PROBLEM SELECTOR PROBLEM 2
Acute kidney injury Cimetidine Pregnancy And Lactation Text: This medication is Pregnancy Category B and is considered safe during pregnancy. It is also excreted in breast milk and breast feeding isn't recommended.

## 2020-09-07 ENCOUNTER — INPATIENT (INPATIENT)
Facility: HOSPITAL | Age: 65
LOS: 10 days | Discharge: NURSING, REHAB OR LONG-TERM CARE | DRG: 260 | End: 2020-09-18
Attending: THORACIC SURGERY (CARDIOTHORACIC VASCULAR SURGERY) | Admitting: THORACIC SURGERY (CARDIOTHORACIC VASCULAR SURGERY)
Payer: MEDICARE

## 2020-09-07 VITALS — OXYGEN SATURATION: 100 % | RESPIRATION RATE: 26 BRPM | HEART RATE: 80 BPM | TEMPERATURE: 98 F

## 2020-09-07 DIAGNOSIS — D64.9 ANEMIA, UNSPECIFIED: ICD-10-CM

## 2020-09-07 DIAGNOSIS — Z95.811 PRESENCE OF HEART ASSIST DEVICE: Chronic | ICD-10-CM

## 2020-09-07 DIAGNOSIS — Z98.890 OTHER SPECIFIED POSTPROCEDURAL STATES: Chronic | ICD-10-CM

## 2020-09-07 LAB
ACANTHOCYTES BLD QL SMEAR: SLIGHT — SIGNIFICANT CHANGE UP
ANION GAP SERPL CALC-SCNC: 13 MMOL/L — SIGNIFICANT CHANGE UP (ref 5–17)
ANISOCYTOSIS BLD QL: SIGNIFICANT CHANGE UP
APTT BLD: 40.4 SEC — HIGH (ref 27.5–35.5)
BASOPHILS # BLD AUTO: 0 K/UL — SIGNIFICANT CHANGE UP (ref 0–0.2)
BASOPHILS NFR BLD AUTO: 0 % — SIGNIFICANT CHANGE UP (ref 0–2)
BLD GP AB SCN SERPL QL: NEGATIVE — SIGNIFICANT CHANGE UP
BUN SERPL-MCNC: 31 MG/DL — HIGH (ref 7–23)
CALCIUM SERPL-MCNC: 9 MG/DL — SIGNIFICANT CHANGE UP (ref 8.4–10.5)
CHLORIDE SERPL-SCNC: 98 MMOL/L — SIGNIFICANT CHANGE UP (ref 96–108)
CO2 SERPL-SCNC: 20 MMOL/L — LOW (ref 22–31)
CREAT SERPL-MCNC: 1.28 MG/DL — SIGNIFICANT CHANGE UP (ref 0.5–1.3)
EOSINOPHIL # BLD AUTO: 0.1 K/UL — SIGNIFICANT CHANGE UP (ref 0–0.5)
EOSINOPHIL NFR BLD AUTO: 0.9 % — SIGNIFICANT CHANGE UP (ref 0–6)
GLUCOSE SERPL-MCNC: 129 MG/DL — HIGH (ref 70–99)
HCT VFR BLD CALC: 21.9 % — LOW (ref 39–50)
HGB BLD-MCNC: 6.3 G/DL — CRITICAL LOW (ref 13–17)
HYPOCHROMIA BLD QL: SLIGHT — SIGNIFICANT CHANGE UP
INR BLD: 5.14 RATIO — CRITICAL HIGH (ref 0.88–1.16)
LYMPHOCYTES # BLD AUTO: 0.59 K/UL — LOW (ref 1–3.3)
LYMPHOCYTES # BLD AUTO: 5.2 % — LOW (ref 13–44)
MACROCYTES BLD QL: SIGNIFICANT CHANGE UP
MANUAL SMEAR VERIFICATION: SIGNIFICANT CHANGE UP
MCHC RBC-ENTMCNC: 28.8 GM/DL — LOW (ref 32–36)
MCHC RBC-ENTMCNC: 29 PG — SIGNIFICANT CHANGE UP (ref 27–34)
MCV RBC AUTO: 100.9 FL — HIGH (ref 80–100)
MICROCYTES BLD QL: SLIGHT — SIGNIFICANT CHANGE UP
MONOCYTES # BLD AUTO: 0.79 K/UL — SIGNIFICANT CHANGE UP (ref 0–0.9)
MONOCYTES NFR BLD AUTO: 7 % — SIGNIFICANT CHANGE UP (ref 2–14)
NEUTROPHILS # BLD AUTO: 9.63 K/UL — HIGH (ref 1.8–7.4)
NEUTROPHILS NFR BLD AUTO: 80 % — HIGH (ref 43–77)
NEUTS BAND # BLD: 5.2 % — SIGNIFICANT CHANGE UP (ref 0–8)
OVALOCYTES BLD QL SMEAR: SLIGHT — SIGNIFICANT CHANGE UP
PLAT MORPH BLD: ABNORMAL
PLATELET # BLD AUTO: 209 K/UL — SIGNIFICANT CHANGE UP (ref 150–400)
POIKILOCYTOSIS BLD QL AUTO: SLIGHT — SIGNIFICANT CHANGE UP
POLYCHROMASIA BLD QL SMEAR: SIGNIFICANT CHANGE UP
POTASSIUM SERPL-MCNC: 4.9 MMOL/L — SIGNIFICANT CHANGE UP (ref 3.5–5.3)
POTASSIUM SERPL-SCNC: 4.9 MMOL/L — SIGNIFICANT CHANGE UP (ref 3.5–5.3)
PROTHROM AB SERPL-ACNC: 56.6 SEC — HIGH (ref 10.6–13.6)
RBC # BLD: 2.17 M/UL — LOW (ref 4.2–5.8)
RBC # FLD: 26.7 % — HIGH (ref 10.3–14.5)
RBC BLD AUTO: ABNORMAL
RH IG SCN BLD-IMP: POSITIVE — SIGNIFICANT CHANGE UP
SARS-COV-2 RNA SPEC QL NAA+PROBE: SIGNIFICANT CHANGE UP
SODIUM SERPL-SCNC: 131 MMOL/L — LOW (ref 135–145)
TROPONIN T, HIGH SENSITIVITY RESULT: 15 NG/L — SIGNIFICANT CHANGE UP (ref 0–51)
VARIANT LYMPHS # BLD: 1.7 % — SIGNIFICANT CHANGE UP (ref 0–6)
WBC # BLD: 11.3 K/UL — HIGH (ref 3.8–10.5)
WBC # FLD AUTO: 11.3 K/UL — HIGH (ref 3.8–10.5)

## 2020-09-07 PROCEDURE — 70480 CT ORBIT/EAR/FOSSA W/O DYE: CPT | Mod: 26,59

## 2020-09-07 PROCEDURE — 99223 1ST HOSP IP/OBS HIGH 75: CPT

## 2020-09-07 PROCEDURE — 72125 CT NECK SPINE W/O DYE: CPT | Mod: 26

## 2020-09-07 PROCEDURE — 71045 X-RAY EXAM CHEST 1 VIEW: CPT | Mod: 26

## 2020-09-07 PROCEDURE — 99222 1ST HOSP IP/OBS MODERATE 55: CPT

## 2020-09-07 PROCEDURE — 70450 CT HEAD/BRAIN W/O DYE: CPT | Mod: 26

## 2020-09-07 PROCEDURE — 99291 CRITICAL CARE FIRST HOUR: CPT

## 2020-09-07 PROCEDURE — 72170 X-RAY EXAM OF PELVIS: CPT | Mod: 26

## 2020-09-07 PROCEDURE — 93010 ELECTROCARDIOGRAM REPORT: CPT

## 2020-09-07 PROCEDURE — 70486 CT MAXILLOFACIAL W/O DYE: CPT | Mod: 26

## 2020-09-07 RX ORDER — ACETAMINOPHEN 500 MG
650 TABLET ORAL ONCE
Refills: 0 | Status: COMPLETED | OUTPATIENT
Start: 2020-09-07 | End: 2020-09-07

## 2020-09-07 RX ADMIN — Medication 650 MILLIGRAM(S): at 17:38

## 2020-09-07 RX ADMIN — Medication 650 MILLIGRAM(S): at 18:08

## 2020-09-07 NOTE — ED ADULT NURSE NOTE - CHPI ED NUR SYMPTOMS NEG
no fever/no nausea/no congestion/no shortness of breath/no vomiting/no diaphoresis/no chest pain/no chills

## 2020-09-07 NOTE — ED PROVIDER NOTE - PROGRESS NOTE DETAILS
D/w Dr Iyer, okw / not reversing pt's INR, transfusing 1u prbc, and admission to [facility] for likely GI bleed. Seen by transplant NP at bedside as well.

## 2020-09-07 NOTE — H&P ADULT - ASSESSMENT
2U PRBC  FFP x 1 to correct INR to 2  ENT consult [id number] to be called   Admit to 2C telemetry: Dr. Cadet    CTS  [id number] 65M PMH HF due to NICM HM2 LVAD (9/2017) and TV-ring, AC with coumadin, CKD3, presenting after presumed fall with questionable syncopal event in setting of supratherapeutic INR, found to have left external auditory canal fracture. No evidence of intracranial bleed.   Increasing R orbital swelling and new complaint of left flank pain.   Patient is hemodynamically stable, currently receiving 1 u PRBC, will receive FFP on arrival to  and 2nd unit of PRBC with lasix.

## 2020-09-07 NOTE — H&P ADULT - HISTORY OF PRESENT ILLNESS
Fell ? syncope while ambulating to refridge to retrieve ensure 65M PMH HF due to NICM HM2 LVAD (2017) on coumadin, TV annuloplasty ring, SIADH, CKD3, presenting after fall. Patient reports he was reaching for ensure in the refrigerator and fell onto the floor. Patient does not remember falling , questionably sustained a syncopal event, reports being helped by his friend who he lives with to his bed, who then called a taxi to bring him to the ED. Does not recall additional details of events. Currently complaining of pain associated with swelling on is face, as well as pain on his left flank/abdomen.     INTERVAL EVENTS: Mentating at baseline in ED, admitted to thoracic surgery for correction of supratherapeutic INR and resuscitation for anemia. CT max-face and CT internal auditory canal revealed acute posterior displaced fracture of anterior wall of left external auditory canal, and right infraorbital soft tissue swelling.

## 2020-09-07 NOTE — ED ADULT NURSE NOTE - NS ED NURSE TRANSPORT WITH
Blood Products Blood Products/Cardiac Monitor/Defib/ACLS/Rescue Kit/O2/BVM/IV pump/LVAD personal batteries. ED LVAD returned to AN[facility] office/pulse ox

## 2020-09-07 NOTE — H&P ADULT - PROBLEM SELECTOR PLAN 2
ENT consult  Irrigation/packing  Ocufloxin 0.3% 5gtt BID left ear  correct INR  receiving 2 u PRBC for acute anemia 6/21

## 2020-09-07 NOTE — H&P ADULT - NSHPPHYSICALEXAM_GEN_ALL_CORE
NEURO: Non-focal, A+Ox3  HEENT: Normocephalic, R orbital ecchymosis and soft tissue swelling, Left ear canal with dried blood.  No obvious CSF drainage.   CV: s1, s2, RRR +LVAD HUM  Pulm: CTA B/L  Abd: Soft, ND, NT, no rebound, no guarding--endorses left sided pain.    : voiding clear yara urine  Ext: Palp radial b/l, palp DP b/l.  No edema.  Driveline site: clean site. Dressing changed       T(F): 99.4   HR: 80   MAP via doppler:  76-82  RR: 18   SpO2: 98% room air    LVAD interrogation:  Speed 8800  Flow:   Power:  PI: 5.7

## 2020-09-07 NOTE — H&P ADULT - BIRTH SEX
Male History, time course, and exam consistent with sinusitis. For treatment with antibiotics and with nasal spray. Discussed probiotics with antibiotics, potential side effects.   Use of Flonase discussed.   Understands to discontinue if any epistaxis or dryness.  Discussed use of humidifiers, vaseline to nares while sleeping, nasal saline for moisture

## 2020-09-07 NOTE — H&P ADULT - NSHPLABSRESULTS_GEN_ALL_CORE
6.3    11.30 )-----------( 209      ( 07 Sep 2020 17:02 )             21.9     09-07    131<L>  |  98  |  31<H>  ----------------------------<  129<H>  4.9   |  20<L>  |  1.28    Ca    9.0      07 Sep 2020 17:02    PT/INR - ( 07 Sep 2020 17:02 )   PT: 56.6 sec;   INR: 5.14 ratio    PTT - ( 07 Sep 2020 17:02 )  PTT:40.4 sec    CT IAC:  Acute posteriorly displaced fracture of the anterior wall of the left external auditory canal with partial opacification of the external auditory canal. Visualized portions of the tympanic membrane appear intact. The bilateral middle ear structures including the scuti, tegmen tympani, ossicles, and bonycoverings of the lateral semicircular canals and facial nerves are intact. The bilateral inner ear structures including the internal auditory canals, semicircular canals, cochleae, and vestibules are unremarkable. The bilateral mastoid air cells are clear. Right external auditory canal is unremarkable.    CT MAXILLOFACIAL:  No acute maxillofacial bone fracture. Chronic appearing left nasal bone deformity. Right facial and infraorbital soft tissue swelling.  Intraorbital contents including the globes, extra ocular muscles, and optic nerves are intact. No retrobulbar hematoma.  Minimal mucosal thickening along the maxillary sinus alveolar recesses cc. No hemorrhagic air-fluid level. No temporomandibular joint dislocation. Mandible is intact.    IMPRESSION:  CT IAC: Acute posteriorly displaced fracture of the anterior wall of the left external auditory canal. Partial opacification of the external auditory canal, likely with blood products.  CT maxillofacial: No acute maxillofacial bone fracture. Right facial/infraorbital soft tissue swelling.

## 2020-09-07 NOTE — H&P ADULT - PROBLEM SELECTOR PLAN 5
Continue ACE as MAP 80  Hold ASA this AM 2/2 bleeding, will inquire on AM rounds  strict Intake and output

## 2020-09-07 NOTE — ED ADULT NURSE REASSESSMENT NOTE - NS ED NURSE REASSESS COMMENT FT1
Pt remains in the ED. Resting comfortably in bed. A&Ox3. Breathing spontaneously and unlabored. NAD. VSS. LVAD team at the bedside. Pt receiving blood. Tolerating well. On cardiac monitor, NSR. Pt safety maintained. Will continue to monitor. Awaiting dispo.

## 2020-09-07 NOTE — H&P ADULT - NSICDXPASTMEDICALHX_GEN_ALL_CORE_FT
PAST MEDICAL HISTORY:  ACC/AHA stage D systolic heart failure     Anticoagulation goal of INR 2.0 to 2.5     Bleeding hemorrhoids     BPH with urinary obstruction     CAD (coronary artery disease)     CHF (congestive heart failure)     CKD (chronic kidney disease), stage III     Claudication     Clavicle fracture     Depression     Falls     GI bleed     H/O epistaxis     Hemorrhoids     History of 2019 novel coronavirus disease (COVID-19) april 2020    Iron deficiency anemia     Peripheral arterial disease     Pleural effusion     Vertigo

## 2020-09-07 NOTE — H&P ADULT - NSHPREVIEWOFSYSTEMS_GEN_ALL_CORE
Review of Systems  Constitutional: [ ] fever, [ ]weight loss,  [ ]fatigue  Eyes: [ ] visual changes  Respiratory: [ ]shortness of breath;  [ ] cough, [ ]wheezing, [ ]chills, [ ]hemoptysis  Cardiovascular: [ ] chest pain, [ ]palpitations, [ ]dizziness,  [ ]leg swelling[ ]orthopnea[ ]PND  Gastrointestinal: [ ] abdominal pain, [ ]nausea, [ ]vomiting,  [ ]diarrhea [ x] Melena  Genitourinary: [ ] dysuria, [ ] hematuria  Neurologic: [x] headache [ ] tremors[ ]weakness  Skin: [ ] itching, [ ]burning, [ ] rashes  Endocrine: [ ] heat or cold intolerance  Musculoskeletal: [ ] joint pain or swelling; [ ] muscle, back, or extremity pain [x] fall   Psychiatric: [ ] depression, [ ]anxiety, [ ]mood swings, or [ ]difficulty sleeping  Hematologic: [ ] easy bruising, [ ] bleeding gums  All other systems negative except as noted

## 2020-09-07 NOTE — ED PROVIDER NOTE - ENMT, MLM
Bruising under R eye, L ear pinna hematoma w/ small amt of dry blood at the outer ear. EOM intact w/o pain.

## 2020-09-07 NOTE — ED ADULT NURSE NOTE - OBJECTIVE STATEMENT
65y Maaox4 ambulatory from home presents to ED from home s/p fall, as per pt states this morning he didn't  feel well,  he was going to the refrigerate to get something around 1330 while he get into the refrigerate he fall , Pt states "I didn't know what happened" he syncompize, friends on his apartment help him to get up From the floor and take him to the bed, PT have LVad and called his team , which they called a taxi to bringing him to [facility] ED, upon arrived pt have in the L ear blood, R eye have bruising and c/o of neck pain ( C-collar placed at the ED) . MD Bravo called the LVAD team and left a message. At this time pt denies cp, sob, abdominal pain, gu issues, n/v/d/ Safety and comfort measures initiated- bed placed in lowest position and side rails raised. Pt oriented to call bell system. 65y Maaox4 ambulatory from home presents to ED from home s/p fall, as per pt states this morning he didn't  feel well,  he was going to the refrigerate to get something around 1330 while he get into the refrigerate he fall , Pt states "I didn't know what happened" he syncompize, friends on his apartment help him to get up From the floor and take him to the bed, PT have LVad and called his team , which they called a taxi to bringing him to [facility] ED, upon arrived pt have in the L ear blood, R eye have bruising ( as per pt no vision issues)   and c/o of neck pain ( C-collar placed at the ED) . MD Bravo called the LVAD team and left a message. At this time pt denies cp, sob, abdominal pain, gu issues, n/v/d/ Safety and comfort measures initiated- bed placed in lowest position and side rails raised. Pt oriented to call bell system. 65y M aaox4 Hx HF, depression, LVAD , ambulatory from home presents to ED from home s/p fall, as per pt states this morning he didn't  feel well,  he was going to the refrigerate to get something around 1330 while he get into the refrigerate he fall , Pt states "I didn't know what happened" he syncompize, friends on his apartment help him to get up From the floor and take him to the bed, PT have LVad (placed 2017) and called his team , which they called a taxi to bringing him to [facility] ED, upon arrived pt have in the L ear blood, R eye have bruising ( as per pt no vision lost, Perrla) ),  and c/o of neck pain ( C-collar placed at the ED) . MD Bravo called the LVAD team and left a message. At this time pt denies cp, sob, abdominal pain, gu issues, n/v/d/ Safety and comfort measures initiated- bed placed in lowest position and side rails raised. Pt oriented to call bell system.

## 2020-09-07 NOTE — H&P ADULT - PROBLEM SELECTOR PLAN 3
Trauma surgery evaluation   CT C/A/P IV contrast pending: rule out additional injury from fall/etiology of anemia

## 2020-09-07 NOTE — ED ADULT NURSE NOTE - NSIMPLEMENTINTERV_GEN_ALL_ED
Implemented All Fall Risk Interventions:  [place] to call system. Call bell, personal items and telephone within reach. Instruct patient to call for assistance. Room bathroom lighting operational. Non-slip footwear when patient is off stretcher. Physically safe environment: no spills, clutter or unnecessary equipment. Stretcher in lowest position, wheels locked, appropriate side rails in place. Provide visual cue, wrist band, yellow gown, etc. Monitor gait and stability. Monitor for mental status changes and reorient to person, place, and time. Review medications for side effects contributing to fall risk. Reinforce activity limits and safety measures with patient and family.

## 2020-09-07 NOTE — ED PROVIDER NOTE - OBJECTIVE STATEMENT
65 M y/o male PMH ACC/AHA stage D HF due to NICM HM2 LVAD placed 2017, TV annuloplasty ring 9/12/17 as destination therapy due to severe peripheral artery disease with significant stenosis,  SIADH, Depression, CKD-3 with hyperkalemia, presenting with fall earlier today pt states he was going to fridge to get ensure and fell; is unsure if he syncopized, helped to bed by his friend he lives w/ who called taxi to go to ER. Pt states he was having black stool a few days ago, he thinks, but hasn't had a BM for 4 days now, no vomiting. Is c/o neck pain, lower back pain at this time.

## 2020-09-07 NOTE — ED PROVIDER NOTE - MUSCULOSKELETAL, MLM
TTP L paraspinal region. Paraspinal c-spine tenderness. Moving all 4 extremities with equal and normal strength

## 2020-09-07 NOTE — ED ADULT NURSE NOTE - PMH
CAD (coronary artery disease)    CHF (congestive heart failure)    Depression    History of 2019 novel coronavirus disease (COVID-19)  april 2020  Pleural effusion

## 2020-09-07 NOTE — H&P ADULT - NSICDXPASTSURGICALHX_GEN_ALL_CORE_FT
PAST SURGICAL HISTORY:  S/P endoscopy     S/P TVR (tricuspid valve repair)     S/P ventricular assist device

## 2020-09-07 NOTE — H&P ADULT - REASON FOR ADMISSION
Please call Yaya to see how he's doing with the Cymbalta and assure that he is scheduling f/u - 3-4 months.  Thanks.
Fall

## 2020-09-07 NOTE — ED PROVIDER NOTE - CLINICAL SUMMARY MEDICAL DECISION MAKING FREE TEXT BOX
65 M w/ LVAD presenting after fall likely syncopal in nature, on exam signs of trauma as above. C/f ICH vs C spine injury, low c/f torso injury based on exam .Will contact LVAD team, get labs, imaging, likely admission.

## 2020-09-07 NOTE — ED PROVIDER NOTE - ATTENDING CONTRIBUTION TO CARE
Pt walking to fridge, passed out, hit head, c/o R facial pain and blood coming from L ear.  Has LVAD.  Exam: pale conj, EOMI, no signs of entrapment, +infraorbital right ecchymosis, +blood from L ear canal, clear lungs, abdomen soft, nt.

## 2020-09-07 NOTE — ED PROVIDER NOTE - CARE PLAN
Principal Discharge DX:	Anemia Principal Discharge DX:	Anemia  Secondary Diagnosis:	Closed head injury  Secondary Diagnosis:	LVAD (left ventricular assist device) present

## 2020-09-07 NOTE — H&P ADULT - NSHPSOCIALHISTORY_GEN_ALL_CORE
SOCIAL HISTORY:  Smoker: [ ] Yes  [x ] No        PACK YEARS:          Quit date:  ETOH use: [ ] Yes  [ x] No              FREQUENCY / QUANTITY:  Ilicit Drug use:  [ ] Yes  [x ] No  Occupation: disabled  Living arrangements: with friend  Assist device use: none

## 2020-09-08 DIAGNOSIS — H92.20 OTORRHAGIA, UNSPECIFIED EAR: ICD-10-CM

## 2020-09-08 DIAGNOSIS — S09.90XA UNSPECIFIED INJURY OF HEAD, INITIAL ENCOUNTER: ICD-10-CM

## 2020-09-08 DIAGNOSIS — H92.22 OTORRHAGIA, LEFT EAR: ICD-10-CM

## 2020-09-08 DIAGNOSIS — Z29.9 ENCOUNTER FOR PROPHYLACTIC MEASURES, UNSPECIFIED: ICD-10-CM

## 2020-09-08 DIAGNOSIS — I50.20 UNSPECIFIED SYSTOLIC (CONGESTIVE) HEART FAILURE: ICD-10-CM

## 2020-09-08 DIAGNOSIS — Z98.890 OTHER SPECIFIED POSTPROCEDURAL STATES: Chronic | ICD-10-CM

## 2020-09-08 DIAGNOSIS — I48.91 UNSPECIFIED ATRIAL FIBRILLATION: ICD-10-CM

## 2020-09-08 DIAGNOSIS — D64.9 ANEMIA, UNSPECIFIED: ICD-10-CM

## 2020-09-08 DIAGNOSIS — R79.1 ABNORMAL COAGULATION PROFILE: ICD-10-CM

## 2020-09-08 DIAGNOSIS — D50.0 IRON DEFICIENCY ANEMIA SECONDARY TO BLOOD LOSS (CHRONIC): ICD-10-CM

## 2020-09-08 DIAGNOSIS — N40.1 BENIGN PROSTATIC HYPERPLASIA WITH LOWER URINARY TRACT SYMPTOMS: ICD-10-CM

## 2020-09-08 DIAGNOSIS — R10.9 UNSPECIFIED ABDOMINAL PAIN: ICD-10-CM

## 2020-09-08 DIAGNOSIS — Z95.811 PRESENCE OF HEART ASSIST DEVICE: ICD-10-CM

## 2020-09-08 LAB
ALBUMIN SERPL ELPH-MCNC: 3.9 G/DL — SIGNIFICANT CHANGE UP (ref 3.3–5)
ALBUMIN SERPL ELPH-MCNC: 4 G/DL — SIGNIFICANT CHANGE UP (ref 3.3–5)
ALP SERPL-CCNC: 52 U/L — SIGNIFICANT CHANGE UP (ref 40–120)
ALP SERPL-CCNC: 55 U/L — SIGNIFICANT CHANGE UP (ref 40–120)
ALT FLD-CCNC: 13 U/L — SIGNIFICANT CHANGE UP (ref 10–45)
ALT FLD-CCNC: 13 U/L — SIGNIFICANT CHANGE UP (ref 10–45)
ANION GAP SERPL CALC-SCNC: 11 MMOL/L — SIGNIFICANT CHANGE UP (ref 5–17)
ANION GAP SERPL CALC-SCNC: 13 MMOL/L — SIGNIFICANT CHANGE UP (ref 5–17)
APTT BLD: 37.2 SEC — HIGH (ref 27.5–35.5)
APTT BLD: 38.5 SEC — HIGH (ref 27.5–35.5)
AST SERPL-CCNC: 21 U/L — SIGNIFICANT CHANGE UP (ref 10–40)
AST SERPL-CCNC: 25 U/L — SIGNIFICANT CHANGE UP (ref 10–40)
BILIRUB SERPL-MCNC: 0.4 MG/DL — SIGNIFICANT CHANGE UP (ref 0.2–1.2)
BILIRUB SERPL-MCNC: 0.4 MG/DL — SIGNIFICANT CHANGE UP (ref 0.2–1.2)
BUN SERPL-MCNC: 26 MG/DL — HIGH (ref 7–23)
BUN SERPL-MCNC: 30 MG/DL — HIGH (ref 7–23)
CALCIUM SERPL-MCNC: 8.9 MG/DL — SIGNIFICANT CHANGE UP (ref 8.4–10.5)
CALCIUM SERPL-MCNC: 8.9 MG/DL — SIGNIFICANT CHANGE UP (ref 8.4–10.5)
CHLORIDE SERPL-SCNC: 95 MMOL/L — LOW (ref 96–108)
CHLORIDE SERPL-SCNC: 97 MMOL/L — SIGNIFICANT CHANGE UP (ref 96–108)
CO2 SERPL-SCNC: 23 MMOL/L — SIGNIFICANT CHANGE UP (ref 22–31)
CO2 SERPL-SCNC: 23 MMOL/L — SIGNIFICANT CHANGE UP (ref 22–31)
CREAT SERPL-MCNC: 1.09 MG/DL — SIGNIFICANT CHANGE UP (ref 0.5–1.3)
CREAT SERPL-MCNC: 1.32 MG/DL — HIGH (ref 0.5–1.3)
GLUCOSE SERPL-MCNC: 100 MG/DL — HIGH (ref 70–99)
GLUCOSE SERPL-MCNC: 96 MG/DL — SIGNIFICANT CHANGE UP (ref 70–99)
HCT VFR BLD CALC: 23 % — LOW (ref 39–50)
HCT VFR BLD CALC: 27.5 % — LOW (ref 39–50)
HCT VFR BLD CALC: 28.6 % — LOW (ref 39–50)
HGB BLD-MCNC: 7.1 G/DL — LOW (ref 13–17)
HGB BLD-MCNC: 8.6 G/DL — LOW (ref 13–17)
HGB BLD-MCNC: 9.1 G/DL — LOW (ref 13–17)
INR BLD: 2.57 RATIO — HIGH (ref 0.88–1.16)
INR BLD: 2.71 RATIO — HIGH (ref 0.88–1.16)
INR BLD: 3.32 RATIO — HIGH (ref 0.88–1.16)
LDH SERPL L TO P-CCNC: 215 U/L — SIGNIFICANT CHANGE UP (ref 50–242)
LDH SERPL L TO P-CCNC: 229 U/L — SIGNIFICANT CHANGE UP (ref 50–242)
MAGNESIUM SERPL-MCNC: 2.1 MG/DL — SIGNIFICANT CHANGE UP (ref 1.6–2.6)
MAGNESIUM SERPL-MCNC: 2.2 MG/DL — SIGNIFICANT CHANGE UP (ref 1.6–2.6)
MCHC RBC-ENTMCNC: 29.6 PG — SIGNIFICANT CHANGE UP (ref 27–34)
MCHC RBC-ENTMCNC: 29.9 PG — SIGNIFICANT CHANGE UP (ref 27–34)
MCHC RBC-ENTMCNC: 30.4 PG — SIGNIFICANT CHANGE UP (ref 27–34)
MCHC RBC-ENTMCNC: 30.9 GM/DL — LOW (ref 32–36)
MCHC RBC-ENTMCNC: 31.3 GM/DL — LOW (ref 32–36)
MCHC RBC-ENTMCNC: 31.8 GM/DL — LOW (ref 32–36)
MCV RBC AUTO: 95.5 FL — SIGNIFICANT CHANGE UP (ref 80–100)
MCV RBC AUTO: 95.7 FL — SIGNIFICANT CHANGE UP (ref 80–100)
MCV RBC AUTO: 95.8 FL — SIGNIFICANT CHANGE UP (ref 80–100)
NRBC # BLD: 0 /100 WBCS — SIGNIFICANT CHANGE UP (ref 0–0)
PHOSPHATE SERPL-MCNC: 3.5 MG/DL — SIGNIFICANT CHANGE UP (ref 2.5–4.5)
PLATELET # BLD AUTO: 155 K/UL — SIGNIFICANT CHANGE UP (ref 150–400)
PLATELET # BLD AUTO: 164 K/UL — SIGNIFICANT CHANGE UP (ref 150–400)
PLATELET # BLD AUTO: 165 K/UL — SIGNIFICANT CHANGE UP (ref 150–400)
POTASSIUM SERPL-MCNC: 4.1 MMOL/L — SIGNIFICANT CHANGE UP (ref 3.5–5.3)
POTASSIUM SERPL-MCNC: 4.4 MMOL/L — SIGNIFICANT CHANGE UP (ref 3.5–5.3)
POTASSIUM SERPL-SCNC: 4.1 MMOL/L — SIGNIFICANT CHANGE UP (ref 3.5–5.3)
POTASSIUM SERPL-SCNC: 4.4 MMOL/L — SIGNIFICANT CHANGE UP (ref 3.5–5.3)
PROT SERPL-MCNC: 6.7 G/DL — SIGNIFICANT CHANGE UP (ref 6–8.3)
PROT SERPL-MCNC: 6.7 G/DL — SIGNIFICANT CHANGE UP (ref 6–8.3)
PROTHROM AB SERPL-ACNC: 29.2 SEC — HIGH (ref 10.6–13.6)
PROTHROM AB SERPL-ACNC: 30.8 SEC — HIGH (ref 10.6–13.6)
PROTHROM AB SERPL-ACNC: 37.3 SEC — HIGH (ref 10.6–13.6)
RBC # BLD: 2.4 M/UL — LOW (ref 4.2–5.8)
RBC # BLD: 2.88 M/UL — LOW (ref 4.2–5.8)
RBC # BLD: 2.99 M/UL — LOW (ref 4.2–5.8)
RBC # FLD: 22.2 % — HIGH (ref 10.3–14.5)
RBC # FLD: 22.4 % — HIGH (ref 10.3–14.5)
RBC # FLD: 24.5 % — HIGH (ref 10.3–14.5)
SARS-COV-2 IGG SERPL QL IA: POSITIVE
SARS-COV-2 IGM SERPL IA-ACNC: 119 AU/ML — HIGH
SODIUM SERPL-SCNC: 131 MMOL/L — LOW (ref 135–145)
SODIUM SERPL-SCNC: 131 MMOL/L — LOW (ref 135–145)
WBC # BLD: 8.48 K/UL — SIGNIFICANT CHANGE UP (ref 3.8–10.5)
WBC # BLD: 8.83 K/UL — SIGNIFICANT CHANGE UP (ref 3.8–10.5)
WBC # BLD: 9.93 K/UL — SIGNIFICANT CHANGE UP (ref 3.8–10.5)
WBC # FLD AUTO: 8.48 K/UL — SIGNIFICANT CHANGE UP (ref 3.8–10.5)
WBC # FLD AUTO: 8.83 K/UL — SIGNIFICANT CHANGE UP (ref 3.8–10.5)
WBC # FLD AUTO: 9.93 K/UL — SIGNIFICANT CHANGE UP (ref 3.8–10.5)

## 2020-09-08 PROCEDURE — 99223 1ST HOSP IP/OBS HIGH 75: CPT

## 2020-09-08 PROCEDURE — 99223 1ST HOSP IP/OBS HIGH 75: CPT | Mod: 25

## 2020-09-08 PROCEDURE — 93750 INTERROGATION VAD IN PERSON: CPT

## 2020-09-08 PROCEDURE — 70450 CT HEAD/BRAIN W/O DYE: CPT | Mod: 26

## 2020-09-08 PROCEDURE — 71260 CT THORAX DX C+: CPT | Mod: 26

## 2020-09-08 PROCEDURE — 99232 SBSQ HOSP IP/OBS MODERATE 35: CPT

## 2020-09-08 PROCEDURE — 74177 CT ABD & PELVIS W/CONTRAST: CPT | Mod: 26

## 2020-09-08 RX ORDER — ACETAMINOPHEN 500 MG
1000 TABLET ORAL ONCE
Refills: 0 | Status: COMPLETED | OUTPATIENT
Start: 2020-09-08 | End: 2020-09-08

## 2020-09-08 RX ORDER — FINASTERIDE 5 MG/1
5 TABLET, FILM COATED ORAL DAILY
Refills: 0 | Status: DISCONTINUED | OUTPATIENT
Start: 2020-09-08 | End: 2020-09-18

## 2020-09-08 RX ORDER — MIRTAZAPINE 45 MG/1
7.5 TABLET, ORALLY DISINTEGRATING ORAL AT BEDTIME
Refills: 0 | Status: DISCONTINUED | OUTPATIENT
Start: 2020-09-08 | End: 2020-09-18

## 2020-09-08 RX ORDER — FERROUS SULFATE 325(65) MG
325 TABLET ORAL THREE TIMES A DAY
Refills: 0 | Status: DISCONTINUED | OUTPATIENT
Start: 2020-09-08 | End: 2020-09-18

## 2020-09-08 RX ORDER — AMIODARONE HYDROCHLORIDE 400 MG/1
200 TABLET ORAL DAILY
Refills: 0 | Status: DISCONTINUED | OUTPATIENT
Start: 2020-09-08 | End: 2020-09-18

## 2020-09-08 RX ORDER — FUROSEMIDE 40 MG
20 TABLET ORAL ONCE
Refills: 0 | Status: COMPLETED | OUTPATIENT
Start: 2020-09-08 | End: 2020-09-08

## 2020-09-08 RX ORDER — SPIRONOLACTONE 25 MG/1
25 TABLET, FILM COATED ORAL DAILY
Refills: 0 | Status: DISCONTINUED | OUTPATIENT
Start: 2020-09-08 | End: 2020-09-18

## 2020-09-08 RX ORDER — OFLOXACIN 0.3 %
5 DROPS OPHTHALMIC (EYE)
Refills: 0 | Status: DISCONTINUED | OUTPATIENT
Start: 2020-09-08 | End: 2020-09-17

## 2020-09-08 RX ORDER — ACETAMINOPHEN 500 MG
650 TABLET ORAL EVERY 6 HOURS
Refills: 0 | Status: DISCONTINUED | OUTPATIENT
Start: 2020-09-08 | End: 2020-09-18

## 2020-09-08 RX ORDER — INFLUENZA VIRUS VACCINE 15; 15; 15; 15 UG/.5ML; UG/.5ML; UG/.5ML; UG/.5ML
0.5 SUSPENSION INTRAMUSCULAR ONCE
Refills: 0 | Status: DISCONTINUED | OUTPATIENT
Start: 2020-09-08 | End: 2020-09-18

## 2020-09-08 RX ORDER — SENNA PLUS 8.6 MG/1
2 TABLET ORAL AT BEDTIME
Refills: 0 | Status: DISCONTINUED | OUTPATIENT
Start: 2020-09-08 | End: 2020-09-18

## 2020-09-08 RX ORDER — METOPROLOL TARTRATE 50 MG
25 TABLET ORAL DAILY
Refills: 0 | Status: DISCONTINUED | OUTPATIENT
Start: 2020-09-08 | End: 2020-09-18

## 2020-09-08 RX ORDER — LISINOPRIL 2.5 MG/1
5 TABLET ORAL DAILY
Refills: 0 | Status: DISCONTINUED | OUTPATIENT
Start: 2020-09-08 | End: 2020-09-09

## 2020-09-08 RX ORDER — LISINOPRIL 2.5 MG/1
2.5 TABLET ORAL AT BEDTIME
Refills: 0 | Status: DISCONTINUED | OUTPATIENT
Start: 2020-09-08 | End: 2020-09-09

## 2020-09-08 RX ORDER — FOLIC ACID 0.8 MG
1 TABLET ORAL DAILY
Refills: 0 | Status: DISCONTINUED | OUTPATIENT
Start: 2020-09-08 | End: 2020-09-18

## 2020-09-08 RX ORDER — PANTOPRAZOLE SODIUM 20 MG/1
40 TABLET, DELAYED RELEASE ORAL EVERY 12 HOURS
Refills: 0 | Status: DISCONTINUED | OUTPATIENT
Start: 2020-09-08 | End: 2020-09-12

## 2020-09-08 RX ORDER — PREGABALIN 225 MG/1
1000 CAPSULE ORAL DAILY
Refills: 0 | Status: DISCONTINUED | OUTPATIENT
Start: 2020-09-08 | End: 2020-09-18

## 2020-09-08 RX ADMIN — Medication 325 MILLIGRAM(S): at 21:52

## 2020-09-08 RX ADMIN — SENNA PLUS 2 TABLET(S): 8.6 TABLET ORAL at 21:52

## 2020-09-08 RX ADMIN — PANTOPRAZOLE SODIUM 40 MILLIGRAM(S): 20 TABLET, DELAYED RELEASE ORAL at 06:22

## 2020-09-08 RX ADMIN — Medication 400 MILLIGRAM(S): at 20:22

## 2020-09-08 RX ADMIN — PREGABALIN 1000 MICROGRAM(S): 225 CAPSULE ORAL at 11:40

## 2020-09-08 RX ADMIN — Medication 1 MILLIGRAM(S): at 11:40

## 2020-09-08 RX ADMIN — FINASTERIDE 5 MILLIGRAM(S): 5 TABLET, FILM COATED ORAL at 11:41

## 2020-09-08 RX ADMIN — Medication 400 MILLIGRAM(S): at 03:00

## 2020-09-08 RX ADMIN — LISINOPRIL 5 MILLIGRAM(S): 2.5 TABLET ORAL at 11:41

## 2020-09-08 RX ADMIN — Medication 1000 MILLIGRAM(S): at 03:30

## 2020-09-08 RX ADMIN — MIRTAZAPINE 7.5 MILLIGRAM(S): 45 TABLET, ORALLY DISINTEGRATING ORAL at 21:52

## 2020-09-08 RX ADMIN — Medication 1000 MILLIGRAM(S): at 20:52

## 2020-09-08 RX ADMIN — Medication 325 MILLIGRAM(S): at 06:22

## 2020-09-08 RX ADMIN — SPIRONOLACTONE 25 MILLIGRAM(S): 25 TABLET, FILM COATED ORAL at 06:22

## 2020-09-08 RX ADMIN — PANTOPRAZOLE SODIUM 40 MILLIGRAM(S): 20 TABLET, DELAYED RELEASE ORAL at 17:01

## 2020-09-08 RX ADMIN — Medication 5 DROP(S): at 17:01

## 2020-09-08 RX ADMIN — AMIODARONE HYDROCHLORIDE 200 MILLIGRAM(S): 400 TABLET ORAL at 06:22

## 2020-09-08 RX ADMIN — Medication 20 MILLIGRAM(S): at 03:50

## 2020-09-08 RX ADMIN — Medication 25 MILLIGRAM(S): at 06:22

## 2020-09-08 RX ADMIN — Medication 325 MILLIGRAM(S): at 13:05

## 2020-09-08 NOTE — CONSULT NOTE ADULT - PROBLEM SELECTOR RECOMMENDATION 9
- CT of IAC: Acute posteriorly displaced fracture of the anterior wall of the left external auditory canal with partial opacification of the external auditory canal.  - Bleeding controlled with Surgicel x1.   - Will remove packing on 9/10.   - Ocufloxin 0.3% 5gtts BID to Left Ear.   - No CSF leak noted.   - Left Facial Nerve appears intact.   - Can f/u with ENT clinic on discharge.     [address], [address]    [place], [place]- [address] [phone]  - ENT will follow.  - Call with questions.     KIMBER MATOS PA-C.   # [id number]  ENT PA. - CT of IAC: Acute posteriorly displaced fracture of the anterior wall of the left external auditory canal with partial opacification of the external auditory canal.  - Bleeding controlled with cauterization and packed with Surgicel x1.   - Will remove packing on 9/10.   - Ocufloxin 0.3% 5gtts BID to Left Ear.   - No CSF leak noted.   - Left Facial Nerve appears intact.   - Can f/u with ENT clinic on discharge.     [address] [facility] [address] [facility], [address]    [place], [place]- [id number] [phone]  - ENT will follow.  - Call with questions.     KIMBER MATOS PA-C.   # [id number]  ENT PA. - CT of IAC: Acute posteriorly displaced fracture of the anterior wall of the left external auditory canal with partial opacification of the external auditory canal.  - Bleeding controlled with cauterization and packed with Surgicel x1.   - Will remove packing on 9/10.   - Ocufloxin 0.3% 5gtts BID to Left Ear.   - No CSF leak noted.   - Left Facial Nerve appears intact.   - ENT will follow.  - Call with questions.     KIMBER MATOS PA-C.   # [id number]  ENT PA.

## 2020-09-08 NOTE — CONSULT NOTE ADULT - ASSESSMENT
65M PMH HF due to NICM HM2 LVAD (2017) on coumadin, TV annuloplasty ring, SIADH, CKD3, was readmitted after fall and stating he had BRBPR. Upon admission patient was noted to have H/H 6.3/21.9, was transfused with 2uPRBC (last on 9/8), with appropriate response. GI consulted and will likely require repeat EGD/ Colonoscopy. CT of IAC revealed left EAC fracture with hemorrhage, which required cauterization and packing. Currently patient remains in SDU for further management. 65M PMH HF due to NICM HM2 LVAD (2017) on coumadin, TV annuloplasty ring, SIADH, CKD3, was readmitted after fall and stating he had BRBPR. Upon admission patient was noted to have H/H 6.3/21.9, was transfused with 2uPRBC (last on 9/8), with appropriate response. GI consulted and will likely require repeat EGD/ Colonoscopy. CT of IAC revealed left EAC fracture with hemorrhage, which required cauterization and packing.

## 2020-09-08 NOTE — CONSULT NOTE ADULT - PROBLEM SELECTOR RECOMMENDATION 5
- Hx of GI bleeds in past  - Will reconsult GI to repeat scope given unknown cause of syncopal episode - Re-admitted with H/H 6.3/21.9 s/p 2uPRBC last on 9/8  -  Hx of GI bleeds, will reconsult GI to determine if patient needs repeat scope given unknown cause of syncopal episode - Re-admitted with H/H 6.3/21.9 s/p 2uPRBC last on 9/8  - States that he had BRBPR at home on 9/7.   - Previously was admitted in July 2020 for GI bleed. Underwent EGD and Colonoscopy on 7/13 no active bleeding or source of bleeding was identified  - Will reconsult GI today for possible repeat imaging. - Re-admitted with H/H 6.3/21.9 s/p 2uPRBC last on 9/8  - States that he had BRBPR at home on 9/7.   - Previously was admitted in July 2020 for GI bleed. Underwent EGD and Colonoscopy on 7/13 no active bleeding or source of bleeding was identified  - Reconsult GI today for further recommendations - Re-admitted with H/H 6.3/21.9 s/p 2uPRBC last on 9/8  - States that he had BRBPR at home on 9/7.   - Previously was admitted in July 2020 for GI bleed. Underwent EGD and Colonoscopy on 7/13 no active bleeding or source of bleeding was identified  - Reconsult GI today for further recommendations  - Give additional unit FFP for rising INR

## 2020-09-08 NOTE — PROGRESS NOTE ADULT - SUBJECTIVE AND OBJECTIVE BOX
L ear suctioned, EAC noted with have open fracture (avulsed skin with slightly exposed bone)   L ear packed with xeroform gauze    Plan:  Continue with otic floxin gtts (even with packing in ear)  INR goals discussed with CTsurge  Maintain BP  ENT will continue to follow  Packing likely to be removed Thursday or Friday L ear suctioned, EAC noted with have open fracture (avulsed skin with slightly exposed bone)   L ear packed with xeroform gauze    reviewed imaging and note left anterior canal/posterior glenoid fossa fracture, likely from condyle forcefully hitting posterior glenoid fossa. displaced but canal is clear and TM visible.    Plan:  Continue with otic floxin gtts (even with packing in ear)  INR goals discussed with CTsurge  Maintain BP  ENT will continue to follow  Packing likely to be removed Thursday or Friday

## 2020-09-08 NOTE — CONSULT NOTE ADULT - PROBLEM SELECTOR RECOMMENDATION 2
- Continue home Lisinopril 5 mg PO daily and Metoprolol XL 25 mg daily  - Continue home Aldactone 25 mg daily - Continue home Lisinopril 5 mg PO daily, plan to start additional Lisinopril 2.5 mg nightly  - Continue home Metoprolol XL 25 mg daily  - Continue home Aldactone 25 mg daily - Continue home Lisinopril 5 mg PO daily, plan to start additional Lisinopril 2.5 mg nightly for tighter BP control to lessen bleeding  - Continue home Metoprolol succinate 25 mg daily  - Continue home Aldactone 25 mg daily

## 2020-09-08 NOTE — CONSULT NOTE ADULT - ASSESSMENT
66 YO M with PMH of ACC/AHA stage D HF due to NICM HM2 LVAD , TV annuloplasty ring 9/12/17 as destination therapy due to severe peripheral artery disease with significant stenosis  SIADH, Depression, CKD-3 with hyperkalemia was consulted for evaluation of Left EAC fracture and hemorrhage s/p fall at home. CT of IAC in ED showed "Acute posteriorly displaced fracture of the anterior wall of the left external auditory canal with partial opacification of the external auditory canal". Blood and blood clots on EAC, unable to visualize TM due to blood in EAC, blood suctioned out, unable to visualize the source of bleeding for cauterization, bleeding controlled with Surgicel x1. INR: 5.14, takes Coumadin at home. S/P FFP. H/H: 6.3/21.9.    . 66 YO M with PMH of ACC/AHA stage D HF due to NICM HM2 LVAD , TV annuloplasty ring 9/12/17 as destination therapy due to severe peripheral artery disease with significant stenosis  SIADH, Depression, CKD-3 with hyperkalemia was consulted for evaluation of Left EAC fracture and hemorrhage s/p fall at home. CT of IAC in ED showed "Acute posteriorly displaced fracture of the anterior wall of the left external auditory canal with partial opacification of the external auditory canal". Blood and blood clots on EAC, unable to visualize TM due to blood in EAC, blood suctioned out, cauterized bleeding lesion  , bleeding controlled with Surgicel x1. INR: 5.14, takes Coumadin at home. S/P FFP. H/H: 6.3/21.9.    .

## 2020-09-08 NOTE — PROGRESS NOTE ADULT - ASSESSMENT
65M PMH HF due to NICM HM2 LVAD (2017) on coumadin, TV annuloplasty ring, SIADH, CKD3, presenting after fall. Patient reports he was reaching for ensure in the refrigerator and fell onto the floor. Patient does not remember falling , questionably sustained a syncopal event, reports being helped by his friend who he lives with to his bed, who then called a taxi to bring him to the ED. Does not recall additional details of events. Currently complaining of pain associated with swelling on is face, as well as pain on his left flank/abdomen.     INTERVAL EVENTS: Mentating at baseline in ED, admitted to thoracic surgery for correction of supratherapeutic INR and resuscitation for anemia. CT max-face and CT internal auditory canal revealed acute posterior displaced fracture of anterior wall of left external auditory canal, and right infraorbital soft tissue swelling.   9/8 < from: CT Maxillofacial No Cont (09.07.20 @ 18:41) >  CT IAC: Acute posteriorly displaced fracture of the anterior wall of the left external auditory canal. Partial opacification of the external auditory canal, likely with blood products.    CT maxillofacial: No acute maxillofacial bone fracture. Right facial/infraorbital soft tissue swelling.    < end of copied text >    Repeat head CT tonight  Pt with bleeding from L ear, ENT evaluating, packed x 2. No surgical intervention  INR up to 3.0, additional ffp x 1

## 2020-09-08 NOTE — CHART NOTE - NSCHARTNOTEFT_GEN_A_CORE
Tertiary Trauma Survey (TTS)    Date of TTS: 09/09/2020 *                               Time: ***  Admit Date: 09/08/2020                                  Trauma Activation: Consult  Admit GCS: E- 4    V- 5    M- 6    HPI:  65M PMH HF due to NICM HM2 LVAD (2017) on coumadin, TV annuloplasty ring, SIADH, CKD3, presenting after fall. Patient reports he was reaching for ensure in the refrigerator and fell onto the floor. Patient does not remember falling , questionably sustained a syncopal event, reports being helped by his friend who he lives with to his bed, who then called a taxi to bring him to the ED. Does not recall additional details of events. Currently complaining of pain associated with swelling on is face, as well as pain on his left flank/abdomen.     INTERVAL EVENTS: Mentating at baseline in ED, admitted to thoracic surgery for correction of supratherapeutic INR and resuscitation for anemia. CT max-face and CT internal auditory canal revealed acute posterior displaced fracture of anterior wall of left external auditory canal, and right infraorbital soft tissue swelling. (07 Sep 2020 20:43)      PAST MEDICAL & SURGICAL HISTORY:  GI bleed  Vertigo  H/O epistaxis  Iron deficiency anemia  CKD (chronic kidney disease), stage III  Clavicle fracture  Falls  Anticoagulation goal of INR 2.0 to 2.5  ACC/AHA stage D systolic heart failure  BPH with urinary obstruction  Claudication  Peripheral arterial disease  Bleeding hemorrhoids  Hemorrhoids  History of 2019 novel coronavirus disease (COVID-19): april 2020  Pleural effusion  Depression  CAD (coronary artery disease)  CHF (congestive heart failure)  S/P endoscopy  S/P ventricular assist device  S/P TVR (tricuspid valve repair)    [X] No significant past history as reviewed with the patient and family    FAMILY HISTORY:  No pertinent family history in first degree relatives    [X] Family history not pertinent as reviewed with the patient and family    SOCIAL HISTORY: ***    Medications (inpatient): aMIOdarone    Tablet 200 milliGRAM(s) Oral daily  cyanocobalamin 1000 MICROGram(s) Oral daily  ferrous    sulfate 325 milliGRAM(s) Oral three times a day  finasteride 5 milliGRAM(s) Oral daily  folic acid 1 milliGRAM(s) Oral daily  influenza   Vaccine 0.5 milliLiter(s) IntraMuscular once  lisinopril 5 milliGRAM(s) Oral daily  metoprolol succinate ER 25 milliGRAM(s) Oral daily  mirtazapine 7.5 milliGRAM(s) Oral at bedtime  ofloxacin 0.3% Solution 5 Drop(s) Left Ear two times a day  pantoprazole    Tablet 40 milliGRAM(s) Oral every 12 hours  senna 2 Tablet(s) Oral at bedtime  spironolactone 25 milliGRAM(s) Oral daily    Medications (PRN):acetaminophen   Tablet .. 650 milliGRAM(s) Oral every 6 hours PRN    Allergies: No Known Allergies  (Intolerances: )    Vital Signs Last 24 Hrs  T(C): 37 (08 Sep 2020 11:42), Max: 37.4 (08 Sep 2020 00:03)  T(F): 98.6 (08 Sep 2020 11:42), Max: 99.4 (08 Sep 2020 00:03)  HR: 85 (08 Sep 2020 11:42) (80 - 88)  BP: 84/65 (08 Sep 2020 11:42) (84/65 - 86/61)  BP(mean): 71 (08 Sep 2020 11:42) (70 - 82)  RR: 18 (08 Sep 2020 11:42) (18 - 26)  SpO2: 98% (08 Sep 2020 11:42) (96% - 100%)  Drug Dosing Weight  Height (cm): 177.8 (07 Sep 2020 22:07)  Weight (kg): 82 (07 Sep 2020 22:07)  BMI (kg/m2): 25.9 (07 Sep 2020 22:07)  BSA (m2): 2 (07 Sep 2020 22:07)    PHYSICAL EXAM: ********  GEN: resting comfortably in bed, in NAD   HEAD: normocephalic, nontender to palpation   NECK: no JVD, nontender to palpation   CHEST: nontender to palpation across clavicles and b/l anterior ribs   BACK: nontender to palpation along midline and b/l posterior ribs   ABD: soft, nontender, nondistended   EXTREM: b/l UE non-tender to palpation                   b/l LE non-tender to palpation                    Moving all extremities spontaneously; warm, well-perfused, palpable distal pulses   NEURO: AOx3, no focal neuro deficits                           9.1    8.48  )-----------( 164      ( 08 Sep 2020 10:33 )             28.6     09-08    131<L>  |  97  |  26<H>  ----------------------------<  96  4.4   |  23  |  1.09    Ca    8.9      08 Sep 2020 03:00    TPro  6.7  /  Alb  4.0  /  TBili  0.4  /  DBili  x   /  AST  25  /  ALT  13  /  AlkPhos  52  09-08    PT/INR - ( 08 Sep 2020 10:33 )   PT: 37.3 sec;   INR: 3.32 ratio         PTT - ( 08 Sep 2020 03:00 )  PTT:38.5 sec      List Injuries Identified to Date:  - Acute posteriorly displaced fracture of the anterior wall of the left external auditory canal with partial opacification of the external auditory canal, likely with blood products.  - Right facial/infraorbital soft tissue swelling.  - Interval new cluster of left lower lobe nodular opacities, which may be secondary to mucoid impacted distal airways.   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    Pelvis Films: No evidence of acute fracture or dislocation.    C-Spine Films:    T/L/S Spine Films:    Extremity Films:    Head CT: No evidence of acute intracranial process.    C-Spine CT: No cervical fracture or subluxation. Degenerative changes.    Neck CT:    Chest CT: Interval new cluster of left lower lobe nodular opacities, which may be secondary to mucoid impacted distal airways. Trace debris in the trachea and right mainstem bronchus.    ABD/Pelvis CT: No acute intra-abdominal pathology. 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 No thoracic or abdominal aortic dissection.      Other:  CT IAC: Acute posteriorly displaced fracture of the anterior wall of the left external auditory canal. Partial opacification of the external auditory canal, likely with blood products.    CT maxillofacial: No acute maxillofacial bone fracture. Right facial/infraorbital soft tissue swelling.    INTERPRETATION:   65M PMH HF due to NICM HM2 LVAD (2017) on coumadin, TV annuloplasty ring, SIADH, CKD3, presenting after presumed fall with questionable syncopal event in setting of supratherapeutic INR, found to have left external auditory canal fracture. No evidence of intracranial bleed.      PLAN:  - Pain control  - Regular diet  - Strict bed rest   - Hold DVT chemoprophylaxis in context of acute bleed   - Correct INR, transfuse PRN   - ENT for management of external auditory canal fracture    [facility]  #[id number] Tertiary Trauma Survey (TTS)    Date of TTS: 09/09/2020 *                                Admit Date: 09/08/2020                                  Trauma Activation: Consult  Admit GCS: E- 4    V- 5    M- 6    HPI:  65M PMH HF due to NICM HM2 LVAD (2017) on coumadin, TV annuloplasty ring, SIADH, CKD3, presenting after fall. Patient reports he was reaching for ensure in the refrigerator and fell onto the floor. Patient does not remember falling , questionably sustained a syncopal event, reports being helped by his friend who he lives with to his bed, who then called a taxi to bring him to the ED. Does not recall additional details of events. Currently complaining of pain associated with swelling on is face, as well as pain on his left flank/abdomen.     INTERVAL EVENTS: Mentating at baseline in ED, admitted to thoracic surgery for correction of supratherapeutic INR and resuscitation for anemia. CT max-face and CT internal auditory canal revealed acute posterior displaced fracture of anterior wall of left external auditory canal, and right infraorbital soft tissue swelling. (07 Sep 2020 20:43)      PAST MEDICAL & SURGICAL HISTORY:  GI bleed  Vertigo  H/O epistaxis  Iron deficiency anemia  CKD (chronic kidney disease), stage III  Clavicle fracture  Falls  Anticoagulation goal of INR 2.0 to 2.5  ACC/AHA stage D systolic heart failure  BPH with urinary obstruction  Claudication  Peripheral arterial disease  Bleeding hemorrhoids  Hemorrhoids  History of 2019 novel coronavirus disease (COVID-19): april 2020  Pleural effusion  Depression  CAD (coronary artery disease)  CHF (congestive heart failure)  S/P endoscopy  S/P ventricular assist device  S/P TVR (tricuspid valve repair)    FAMILY HISTORY:    [X] Family history not pertinent as reviewed with the patient and family    SOCIAL HISTORY: lives at home    Medications (inpatient): aMIOdarone    Tablet 200 milliGRAM(s) Oral daily  cyanocobalamin 1000 MICROGram(s) Oral daily  ferrous    sulfate 325 milliGRAM(s) Oral three times a day  finasteride 5 milliGRAM(s) Oral daily  folic acid 1 milliGRAM(s) Oral daily  influenza   Vaccine 0.5 milliLiter(s) IntraMuscular once  lisinopril 5 milliGRAM(s) Oral daily  metoprolol succinate ER 25 milliGRAM(s) Oral daily  mirtazapine 7.5 milliGRAM(s) Oral at bedtime  ofloxacin 0.3% Solution 5 Drop(s) Left Ear two times a day  pantoprazole    Tablet 40 milliGRAM(s) Oral every 12 hours  senna 2 Tablet(s) Oral at bedtime  spironolactone 25 milliGRAM(s) Oral daily    Medications (PRN):acetaminophen   Tablet .. 650 milliGRAM(s) Oral every 6 hours PRN    Allergies: No Known Allergies  (Intolerances: )    Vital Signs Last 24 Hrs  T(C): 37 (08 Sep 2020 11:42), Max: 37.4 (08 Sep 2020 00:03)  T(F): 98.6 (08 Sep 2020 11:42), Max: 99.4 (08 Sep 2020 00:03)  HR: 85 (08 Sep 2020 11:42) (80 - 88)  BP: 84/65 (08 Sep 2020 11:42) (84/65 - 86/61)  BP(mean): 71 (08 Sep 2020 11:42) (70 - 82)  RR: 18 (08 Sep 2020 11:42) (18 - 26)  SpO2: 98% (08 Sep 2020 11:42) (96% - 100%)  Drug Dosing Weight  Height (cm): 177.8 (07 Sep 2020 22:07)  Weight (kg): 82 (07 Sep 2020 22:07)  BMI (kg/m2): 25.9 (07 Sep 2020 22:07)  BSA (m2): 2 (07 Sep 2020 22:07)    PHYSICAL EXAM:   GEN: resting comfortably in bed, in NAD   HEAD: normocephalic, nontender to palpation   NECK: no JVD, nontender to palpation   CHEST: nontender to palpation across clavicles and b/l anterior ribs   BACK: nontender to palpation along midline and b/l posterior ribs   ABD: soft, nontender, nondistended   EXTREM: b/l UE non-tender to palpation                   b/l LE non-tender to palpation                    Moving all extremities spontaneously; warm, well-perfused, palpable distal pulses   NEURO: AOx3, no focal neuro deficits                           9.1    8.48  )-----------( 164      ( 08 Sep 2020 10:33 )             28.6     09-08    131<L>  |  97  |  26<H>  ----------------------------<  96  4.4   |  23  |  1.09    Ca    8.9      08 Sep 2020 03:00    TPro  6.7  /  Alb  4.0  /  TBili  0.4  /  DBili  x   /  AST  25  /  ALT  13  /  AlkPhos  52  09-08    PT/INR - ( 08 Sep 2020 10:33 )   PT: 37.3 sec;   INR: 3.32 ratio         PTT - ( 08 Sep 2020 03:00 )  PTT:38.5 sec      List Injuries Identified to Date:  - Acute posteriorly displaced fracture of the anterior wall of the left external auditory canal with partial opacification of the external auditory canal, likely with blood products.  - Right facial/infraorbital soft tissue swelling.  - Interval new cluster of left lower lobe nodular opacities, which may be secondary to mucoid impacted distal airways.   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    Pelvis Films: No evidence of acute fracture or dislocation.    Head CT: No evidence of acute intracranial process.    C-Spine CT: No cervical fracture or subluxation. Degenerative changes.    Chest CT: Interval new cluster of left lower lobe nodular opacities, which may be secondary to mucoid impacted distal airways. Trace debris in the trachea and right mainstem bronchus.    ABD/Pelvis CT: No acute intra-abdominal pathology. 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 No thoracic or abdominal aortic dissection.      Other:  CT IAC: Acute posteriorly displaced fracture of the anterior wall of the left external auditory canal. Partial opacification of the external auditory canal, likely with blood products.    CT maxillofacial: No acute maxillofacial bone fracture. Right facial/infraorbital soft tissue swelling.    INTERPRETATION:   65M PMH HF due to NICM HM2 LVAD (2017) on coumadin, TV annuloplasty ring, SIADH, CKD3, presenting after presumed fall with questionable syncopal event in setting of supratherapeutic INR, found to have left external auditory canal fracture. No evidence of intracranial bleed.      PLAN:  - Pain control  - Regular diet  - Strict bed rest   - Hold DVT chemoprophylaxis in context of acute bleed   - Correct INR, transfuse PRN   - ENT for management of external auditory canal fracture  - Heart failure team for management of cardiac issues    [facility]  #[id number] Tertiary Trauma Survey (TTS)    Date of TTS: 09/08/2020                                 Admit Date: 09/08/2020                                  Trauma Activation: Consult  Admit GCS: E- 4    V- 5    M- 6    HPI:  65M PMH HF due to NICM HM2 LVAD (2017) on coumadin, TV annuloplasty ring, SIADH, CKD3, presenting after fall. Patient reports he was reaching for ensure in the refrigerator and fell onto the floor. Patient does not remember falling , questionably sustained a syncopal event, reports being helped by his friend who he lives with to his bed, who then called a taxi to bring him to the ED. Does not recall additional details of events. Currently complaining of pain associated with swelling on is face, as well as pain on his left flank/abdomen.     INTERVAL EVENTS: Mentating at baseline in ED, admitted to thoracic surgery for correction of supratherapeutic INR and resuscitation for anemia. CT max-face and CT internal auditory canal revealed acute posterior displaced fracture of anterior wall of left external auditory canal, and right infraorbital soft tissue swelling. (07 Sep 2020 20:43)      PAST MEDICAL & SURGICAL HISTORY:  GI bleed  Vertigo  H/O epistaxis  Iron deficiency anemia  CKD (chronic kidney disease), stage III  Clavicle fracture  Falls  Anticoagulation goal of INR 2.0 to 2.5  ACC/AHA stage D systolic heart failure  BPH with urinary obstruction  Claudication  Peripheral arterial disease  Bleeding hemorrhoids  Hemorrhoids  History of 2019 novel coronavirus disease (COVID-19): april 2020  Pleural effusion  Depression  CAD (coronary artery disease)  CHF (congestive heart failure)  S/P endoscopy  S/P ventricular assist device  S/P TVR (tricuspid valve repair)    FAMILY HISTORY:    [X] Family history not pertinent as reviewed with the patient and family    SOCIAL HISTORY: lives at home    Medications (inpatient): aMIOdarone    Tablet 200 milliGRAM(s) Oral daily  cyanocobalamin 1000 MICROGram(s) Oral daily  ferrous    sulfate 325 milliGRAM(s) Oral three times a day  finasteride 5 milliGRAM(s) Oral daily  folic acid 1 milliGRAM(s) Oral daily  influenza   Vaccine 0.5 milliLiter(s) IntraMuscular once  lisinopril 5 milliGRAM(s) Oral daily  metoprolol succinate ER 25 milliGRAM(s) Oral daily  mirtazapine 7.5 milliGRAM(s) Oral at bedtime  ofloxacin 0.3% Solution 5 Drop(s) Left Ear two times a day  pantoprazole    Tablet 40 milliGRAM(s) Oral every 12 hours  senna 2 Tablet(s) Oral at bedtime  spironolactone 25 milliGRAM(s) Oral daily    Medications (PRN):acetaminophen   Tablet .. 650 milliGRAM(s) Oral every 6 hours PRN    Allergies: No Known Allergies  (Intolerances: )    Vital Signs Last 24 Hrs  T(C): 37 (08 Sep 2020 11:42), Max: 37.4 (08 Sep 2020 00:03)  T(F): 98.6 (08 Sep 2020 11:42), Max: 99.4 (08 Sep 2020 00:03)  HR: 85 (08 Sep 2020 11:42) (80 - 88)  BP: 84/65 (08 Sep 2020 11:42) (84/65 - 86/61)  BP(mean): 71 (08 Sep 2020 11:42) (70 - 82)  RR: 18 (08 Sep 2020 11:42) (18 - 26)  SpO2: 98% (08 Sep 2020 11:42) (96% - 100%)  Drug Dosing Weight  Height (cm): 177.8 (07 Sep 2020 22:07)  Weight (kg): 82 (07 Sep 2020 22:07)  BMI (kg/m2): 25.9 (07 Sep 2020 22:07)  BSA (m2): 2 (07 Sep 2020 22:07)    PHYSICAL EXAM:   GEN: resting comfortably in bed, in NAD; ecchymoses and swelling below R eye; active bleeding from L ear - similar to prior exam.   HEAD: normocephalic, nontender to palpation   NECK: no JVD, nontender to palpation, no lymphadenopathy    CHEST: nontender to palpation across clavicles and b/l anterior ribs; slight tenderness to palpation in sternum    BACK: nontender to palpation along midline and b/l posterior ribs   ABD: soft, diffusely nontender, nondistended; slight tenderness to palpation in L abdomen    EXTREM: b/l UE non-tender to palpation                b/l LE non-tender to palpation                 Moving all extremities spontaneously; warm, well-perfused, palpable distal pulses   NEURO: AOx3, no focal neuro deficits                           9.1    8.48  )-----------( 164      ( 08 Sep 2020 10:33 )             28.6     09-08    131<L>  |  97  |  26<H>  ----------------------------<  96  4.4   |  23  |  1.09    Ca    8.9      08 Sep 2020 03:00    TPro  6.7  /  Alb  4.0  /  TBili  0.4  /  DBili  x   /  AST  25  /  ALT  13  /  AlkPhos  52  09-08    PT/INR - ( 08 Sep 2020 10:33 )   PT: 37.3 sec;   INR: 3.32 ratio         PTT - ( 08 Sep 2020 03:00 )  PTT:38.5 sec      List Injuries Identified to Date:  - Acute posteriorly displaced fracture of the anterior wall of the left external auditory canal with partial opacification of the external auditory canal, likely with blood products.  - Right facial/infraorbital soft tissue swelling.  - Interval new cluster of left lower lobe nodular opacities, which may be secondary to mucoid impacted distal airways.   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    Pelvis Films: No evidence of acute fracture or dislocation.    Head CT: No evidence of acute intracranial process.    C-Spine CT: No cervical fracture or subluxation. Degenerative changes.    Chest CT: Interval new cluster of left lower lobe nodular opacities, which may be secondary to mucoid impacted distal airways. Trace debris in the trachea and right mainstem bronchus.    ABD/Pelvis CT: No acute intra-abdominal pathology. 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 No thoracic or abdominal aortic dissection.      Other:  CT IAC: Acute posteriorly displaced fracture of the anterior wall of the left external auditory canal. Partial opacification of the external auditory canal, likely with blood products.    CT maxillofacial: No acute maxillofacial bone fracture. Right facial/infraorbital soft tissue swelling.    INTERPRETATION:   65M PMH HF due to NICM HM2 LVAD (2017) on coumadin, TV annuloplasty ring, SIADH, CKD3, presenting after presumed fall with questionable syncopal event in setting of supratherapeutic INR, found to have left external auditory canal fracture. No evidence of intracranial bleed.      PLAN:  - Repeat CTH: patient agreeing to imaging, no complaints   - Pain control  - Regular diet  - Strict bed rest   - Hold DVT chemoprophylaxis in context of acute bleed   - Correct INR, transfuse PRN   - ENT for management of external auditory canal fracture  - Heart failure team for management of cardiac issues    [facility]  #[id number] Tertiary Trauma Survey (TTS)    Date of TTS: 09/08/2020                                 Admit Date: 09/08/2020                                  Trauma Activation: Consult  Admit GCS: E- 4    V- 5    M- 6    HPI:  65M PMH HF, TV annuloplasty ring, SIADH, CKD3, on Coumadin, presenting after fall. Patient reports he was reaching for Ensure in the refrigerator and fell onto the floor. Patient does not remember falling, questionably sustained a syncopal event, reports being helped to his bed, then was brought to ED. Does not recall additional details of events. Currently complaining of pain associated with swelling on his face, as well as pain on his left flank/abdomen.     INTERVAL EVENTS: Mentating at baseline in ED, admitted to thoracic surgery for correction of supratherapeutic INR and resuscitation for anemia. CT max-face and CT internal auditory canal revealed acute posterior displaced fracture of anterior wall of left external auditory canal, and right infraorbital soft tissue swelling. (07 Sep 2020 20:43)      PAST MEDICAL & SURGICAL HISTORY:  GI bleed  Vertigo  H/O epistaxis  Iron deficiency anemia  CKD (chronic kidney disease), stage III  Clavicle fracture  Falls  Anticoagulation goal of INR 2.0 to 2.5  ACC/AHA stage D systolic heart failure  BPH with urinary obstruction  Claudication  Peripheral arterial disease  Bleeding hemorrhoids  History of 2019 novel coronavirus disease (COVID-19): April 2020  Pleural effusion  Depression  CAD (coronary artery disease)  CHF (congestive heart failure)  S/P endoscopy  S/P ventricular assist device  S/P TVR (tricuspid valve repair)    FAMILY HISTORY:    [X] Family history not pertinent as reviewed with the patient and family    SOCIAL HISTORY: lives at home    Medications (inpatient): aMIOdarone    Tablet 200 milliGRAM(s) Oral daily  cyanocobalamin 1000 MICROGram(s) Oral daily  ferrous    sulfate 325 milliGRAM(s) Oral three times a day  finasteride 5 milliGRAM(s) Oral daily  folic acid 1 milliGRAM(s) Oral daily  influenza   Vaccine 0.5 milliLiter(s) IntraMuscular once  lisinopril 5 milliGRAM(s) Oral daily  metoprolol succinate ER 25 milliGRAM(s) Oral daily  mirtazapine 7.5 milliGRAM(s) Oral at bedtime  ofloxacin 0.3% Solution 5 Drop(s) Left Ear two times a day  pantoprazole    Tablet 40 milliGRAM(s) Oral every 12 hours  senna 2 Tablet(s) Oral at bedtime  spironolactone 25 milliGRAM(s) Oral daily    Medications (PRN):acetaminophen   Tablet .. 650 milliGRAM(s) Oral every 6 hours PRN    Allergies: No Known Allergies  (Intolerances: )    Vital Signs Last 24 Hrs  T(C): 37 (08 Sep 2020 11:42), Max: 37.4 (08 Sep 2020 00:03)  T(F): 98.6 (08 Sep 2020 11:42), Max: 99.4 (08 Sep 2020 00:03)  HR: 85 (08 Sep 2020 11:42) (80 - 88)  BP: 84/65 (08 Sep 2020 11:42) (84/65 - 86/61)  BP(mean): 71 (08 Sep 2020 11:42) (70 - 82)  RR: 18 (08 Sep 2020 11:42) (18 - 26)  SpO2: 98% (08 Sep 2020 11:42) (96% - 100%)  Drug Dosing Weight  Height (cm): 177.8 (07 Sep 2020 22:07)  Weight (kg): 82 (07 Sep 2020 22:07)  BMI (kg/m2): 25.9 (07 Sep 2020 22:07)  BSA (m2): 2 (07 Sep 2020 22:07)    PHYSICAL EXAM:   GEN: resting comfortably in bed, in NAD; ecchymoses and swelling below R eye; active bleeding from L ear - similar to prior exam.   HEAD: normocephalic, nontender to palpation   NECK: no JVD, nontender to palpation, no lymphadenopathy    CHEST: nontender to palpation across clavicles and b/l anterior ribs; slight tenderness to palpation in sternum    BACK: nontender to palpation along midline and b/l posterior ribs   ABD: soft, diffusely nontender, nondistended; slight tenderness to palpation in L abdomen    EXTREM: b/l UE non-tender to palpation                b/l LE non-tender to palpation                 Moving all extremities spontaneously; warm, well-perfused, palpable distal pulses   NEURO: AOx3, no focal neuro deficits                           9.1    8.48  )-----------( 164      ( 08 Sep 2020 10:33 )             28.6     09-08    131<L>  |  97  |  26<H>  ----------------------------<  96  4.4   |  23  |  1.09    Ca    8.9      08 Sep 2020 03:00    TPro  6.7  /  Alb  4.0  /  TBili  0.4  /  DBili  x   /  AST  25  /  ALT  13  /  AlkPhos  52  09-08    PT/INR - ( 08 Sep 2020 10:33 )   PT: 37.3 sec;   INR: 3.32 ratio         PTT - ( 08 Sep 2020 03:00 )  PTT:38.5 sec      List Injuries Identified to Date:  - Acute posteriorly displaced fracture of the anterior wall of the left external auditory canal with partial opacification of the external auditory canal, likely with blood products.  - Right facial/infraorbital soft tissue swelling.  - Interval new cluster of left lower lobe nodular opacities, which may be secondary to mucoid impacted distal airways.   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    Pelvis Films: No evidence of acute fracture or dislocation.    Head CT: No evidence of acute intracranial process.    C-Spine CT: No cervical fracture or subluxation. Degenerative changes.    Chest CT: Interval new cluster of left lower lobe nodular opacities, which may be secondary to mucoid impacted distal airways. Trace debris in the trachea and right mainstem bronchus.    ABD/Pelvis CT: No acute intra-abdominal pathology. 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 No thoracic or abdominal aortic dissection.      Other:  CT IAC: Acute posteriorly displaced fracture of the anterior wall of the left external auditory canal. Partial opacification of the external auditory canal, likely with blood products.    CT maxillofacial: No acute maxillofacial bone fracture. Right facial/infraorbital soft tissue swelling.    INTERPRETATION:   65M PMH as above, presenting after presumed fall with questionable syncopal event in setting of supratherapeutic INR, found to have left external auditory canal fracture. No evidence of intracranial bleed.      PLAN:  - Imaging reviewed above, no further workup recommended  - ENT for management of external auditory canal fracture  - Heart failure team for management of cardiac issues  - Defer management to primary/consulting teams  - trauma will sign off, no need for follow-up with trauma surgery as an outpatient if follows with other teams  - please call with any issues    [facility]  #[id number]

## 2020-09-08 NOTE — CONSULT NOTE ADULT - ASSESSMENT
Assessment:   65M PMH HF due to NICM HM2 LVAD (2017) on coumadin, TV annuloplasty ring, SIADH, CKD3, presenting after presumed fall with questionable syncopal event in setting of supratherapeutic INR, found to have left external auditory canal fracture. No evidence of intracranial bleed.    Plan:   - Given questionable mechanism of injury, cannot rule out injury within chest and abdomen  - Obtain CT chest, abdomen/pelvis with IV con   - Correct INR, transfuse PRN   - ENT for management of external auditory canal fracture    Discussed with Dr. Azar     Please contact [facility] (p. [phone]) with any questions.     Asiya Sy, PGY2  Surgery, [facility]   Pager [phone]  [facility]

## 2020-09-08 NOTE — CONSULT NOTE ADULT - SUBJECTIVE AND OBJECTIVE BOX
TRAUMA SURGERY CONSULT NOTE  RICKY JOINT  |  [id number]  |  09-08-20 @ 00:45    CC: Patient is a 65y old  Male who presents with a chief complaint of Fall. (08 Sep 2020 00:02)    HPI: 65M PMH HF due to NICM HM2 LVAD (2017) on coumadin, TV annuloplasty ring, SIADH, CKD3, presenting after fall. Patient reports he was reaching for ensure in the refrigerator and fell onto the floor. Patient does not remember falling , questionably sustained a syncopal event, reports being helped by his friend who he lives with to his bed, who then called a taxi to bring him to the ED. Does not recall additional details of events. Currently complaining of pain associated with swelling on is face, as well as pain on his left flank/abdomen.     INTERVAL EVENTS: Mentating at baseline in ED, admitted to thoracic surgery for correction of supratherapeutic INR and resuscitation for anemia. CT max-face and CT internal auditory canal revealed acute posterior displaced fracture of anterior wall of left external auditory canal, and right infraorbital soft tissue swelling.     REVIEW OF SYSTEMS:  General: denies weight change, fever or fatigue  HEENT: denies sore throat, hoarseness  Respiratory: denies cough, shortness of breath at rest and on exertion, wheezing  Cardiovascular: denies chest pain, abnormal heart rhythm, PND, palpitations  Gastrointestinal: +dark bowel movements   Genitourinary: denies frequent urination, painful urination, kidney disease  Neurological: denies seizures, headaches  Muscoloskeletal: denies any joint pains  Psychiatric: denies depression, anxiety    PAST MEDICAL & SURGICAL HISTORY:  Vertigo  H/O epistaxis  Iron deficiency anemia  CKD (chronic kidney disease), stage III  Clavicle fracture  Falls  Anticoagulation goal of INR 2.0 to 2.5  ACC/AHA stage D systolic heart failure  BPH with urinary obstruction  Claudication  Peripheral arterial disease  Bleeding hemorrhoids  Hemorrhoids  History of 2019 novel coronavirus disease (COVID-19): april 2020  Pleural effusion  Depression  CAD (coronary artery disease)  CHF (congestive heart failure)  S/P ventricular assist device  S/P TVR (tricuspid valve repair)    MEDICATIONS  (STANDING):  aMIOdarone    Tablet 200 milliGRAM(s) Oral daily  finasteride 5 milliGRAM(s) Oral daily  furosemide   Injectable 20 milliGRAM(s) IV Push once  influenza   Vaccine 0.5 milliLiter(s) IntraMuscular once  lisinopril 5 milliGRAM(s) Oral daily  metoprolol succinate ER 25 milliGRAM(s) Oral daily  mirtazapine 7.5 milliGRAM(s) Oral at bedtime  pantoprazole    Tablet 40 milliGRAM(s) Oral every 12 hours  spironolactone 25 milliGRAM(s) Oral daily    Allergies: No Known Allergies    SOCIAL HISTORY: lives with his friend    FAMILY HISTORY: noncontributory     Objective:   Vital Signs Last 24 Hrs  T(C): 37.4 (08 Sep 2020 00:03), Max: 37.4 (08 Sep 2020 00:03)  T(F): 99.4 (08 Sep 2020 00:03), Max: 99.4 (08 Sep 2020 00:03)  HR: 80 (08 Sep 2020 00:03) (80 - 88)  BP: --  BP(mean): 76 (08 Sep 2020 00:03) (76 - 82)  RR: 18 (08 Sep 2020 00:03) (18 - 26)  SpO2: 98% (08 Sep 2020 00:03) (98% - 100%)    Physical Exam:    Primary Survey  A - airway intact  B - bilateral breath sounds and good chest rise  C - LVAD with MAP >70  D - GCS 15 on arrival  Exposure obtained    Secondary survey  Gen: NAD  HEENT: soft tissue swelling around right obit, left ear canal with surgicel packing in place   CV: s1, s2, RRR  Pulm: CTA B/L  Chest: No TTP  Abd: Soft, ND, NT, no rebound, no guarding  Groin: Normal appearing  Ext: Palp radial b/l, palp DP b/l  Back: no TTP, no palpable runoff, stepoff, or deformity      LABS:                        6.3    11.30 )-----------( 209      ( 07 Sep 2020 17:02 )             21.9     09-07    131<L>  |  98  |  31<H>  ----------------------------<  129<H>  4.9   |  20<L>  |  1.28    Ca    9.0      07 Sep 2020 17:02      PT/INR - ( 07 Sep 2020 17:02 )   PT: 56.6 sec;   INR: 5.14 ratio         PTT - ( 07 Sep 2020 17:02 )  PTT:40.4 sec      RADIOLOGY & ADDITIONAL STUDIES:    CT Maxillofacial No Cont (09.07.20 @ 18:41)   IMPRESSION:    CT IAC: Acute posteriorly displaced fracture of the anterior wall of the left external auditory canal. Partial opacification of the external auditory canal, likely with blood products.    CT maxillofacial: No acute maxillofacial bone fracture. Right facial/infraorbital soft tissue swelling.    CT Cervical Spine No Cont (09.07.20 @ 18:40)   IMPRESSION:  1. No evidence of acute intracranial process. No intracranial hemorrhage  2. No cervical fracture or subluxation. Degenerative changes.

## 2020-09-08 NOTE — CONSULT NOTE ADULT - SUBJECTIVE AND OBJECTIVE BOX
HPI: 65M PMH HF due to NICM HM2 LVAD (2017) on coumadin, TV annuloplasty ring, SIADH, CKD3, presenting after fall. Patient reports he was reaching for ensure in the refrigerator and fell onto the floor. Patient does not remember falling , questionably sustained a syncopal event, reports being helped by his friend who he lives with to his bed, who then called a taxi to bring him to the ED.  While in the ED  noted to have left EAC fracture with hemorrhage. Admits to feeling tinnitus and having facial swelling. Otherwise denies dizziness/vertigo/nystagmus, N/V, fever/chills, cough, CP/SOB/ Palpitations/ Abdominal Pain/Diarrhea.     PAST MEDICAL HISTORY:  ACC/AHA stage D systolic heart failure   Anticoagulation goal of INR 2.0 to 2.5   Bleeding hemorrhoids   BPH with urinary obstruction   CAD (coronary artery disease)   CHF (congestive heart failure)   CKD (chronic kidney disease), stage III   Claudication   Clavicle fracture   Depression   Falls   GI bleed   H/O epistaxis   Hemorrhoids   History of 2019 novel coronavirus disease (COVID-19) april 2020  Iron deficiency anemia   Peripheral arterial disease   Pleural effusion   Vertigo.     PAST SURGICAL HISTORY:  S/P endoscopy   S/P TVR (tricuspid valve repair)   S/P ventricular assist device.     FAMILY HISTORY:  No pertinent family history in first degree relatives.   No Pertinent Family History in first degree relatives of: cardiac disease.    Allergies.  No Known Allergies    ROS:   ENT: all negative except as noted in HPI.   CV: denies palpitations.  Pulm: denies SOB, cough, hemoptysis.  GI: denies change in apetite, indigestion, n/v.  : denies pertinent urinary symptoms, urgency.  Neuro: denies numbness/tingling, loss of sensation.  Psych: denies anxiety.  MS: denies muscle weakness, instability.  Heme: denies easy bruising or bleeding.  Endo: denies heat/cold intolerance, excessive sweating.  Vascular: denies LE edema.      Medications:  acetaminophen   Tablet .. 650 milliGRAM(s) Oral every 6 hours PRN  aMIOdarone    Tablet 200 milliGRAM(s) Oral daily  cyanocobalamin 1000 MICROGram(s) Oral daily  ferrous    sulfate 325 milliGRAM(s) Oral three times a day  finasteride 5 milliGRAM(s) Oral daily  folic acid 1 milliGRAM(s) Oral daily  influenza   Vaccine 0.5 milliLiter(s) IntraMuscular once  lisinopril 5 milliGRAM(s) Oral daily  metoprolol succinate ER 25 milliGRAM(s) Oral daily  mirtazapine 7.5 milliGRAM(s) Oral at bedtime  Ofloxacin 0.3% Otic Solution 5 Drop(s) 5 Drop(s) Left Ear two times a day  pantoprazole    Tablet 40 milliGRAM(s) Oral every 12 hours  senna 2 Tablet(s) Oral at bedtime  spironolactone 25 milliGRAM(s) Oral daily      Vitals:  Vital Signs Last 24 Hours  T(C): 37.2 (09-08-20 @ 07:47), Max: 37.4 (09-08-20 @ 00:03)  HR: 82 (09-08-20 @ 07:47) (80 - 88)  BP: --  RR: 18 (09-08-20 @ 07:47) (18 - 26)  SpO2: 97% (09-08-20 @ 07:47) (97% - 100%)    Tele: SR     I&O's Summary    07 Sep 2020 07:01  -  08 Sep 2020 07:00  --------------------------------------------------------  IN: 700 mL / OUT: 1575 mL / NET: -875 mL    08 Sep 2020 07:01  -  08 Sep 2020 09:11  --------------------------------------------------------  IN: 100 mL / OUT: 300 mL / NET: -200 mL      Physical Exam  General: No distress. Comfortable.  HEENT: Right facial swelling along with infra orbital ecchymosis. left ear noted to have packing in the canal    Neck: Neck supple. JVP not appreciated. No masses  Chest: Clear to auscultation bilaterally  CV: +VAD hum  Abdomen: Soft, non-distended, non-tender  Skin: No rashes or skin breakdown  Neurology: Alert and oriented times three. Sensation intact  Psych: Affect normal    LVAD Interrogation  VAD TYPE HM 2  Speed  8800  Flow 4.4  Power 4.7  PI  6.7  Assessment of driveline exit site: driveline dressing c/d/i  Programming changes: no changes made  Alarms: noted to have multiple low flow alarms with speed drop changes (low speed 8500)     Labs:                        7.1    8.83  )-----------( 155      ( 08 Sep 2020 03:00 )             23.0     09-08    131<L>  |  97  |  26<H>  ----------------------------<  96  4.4   |  23  |  1.09    Ca    8.9      08 Sep 2020 03:00    TPro  6.7  /  Alb  4.0  /  TBili  0.4  /  DBili  x   /  AST  25  /  ALT  13  /  AlkPhos  52  09-08    PT/INR - ( 08 Sep 2020 03:00 )   PT: 30.8 sec;   INR: 2.71 ratio         PTT - ( 08 Sep 2020 03:00 )  PTT:38.5 sec    Lactate Dehydrogenase, Serum: 229 U/L (09-08 @ 03:00)  Lactate Dehydrogenase, Serum: 311 U/L (09-07 @ 17:02)      Imaging Studies     Chest 1 View AP/PA 9/7: clear lungs    CT Head 9/7: CT head: There is no CT evidence for acute intracranial hemorrhage or midline shift. No extra-axial fluid collections. CSF spaces are normal in size and configuration.    CT cervical 9/7: Cervical vertebral column is normal in height with straightening. No fracture or subluxation. Moderate degenerative disc disease extending from C3-C6 with uncovertebral spurring, causing multilevel at least moderate neural foraminal narrowing bilaterally. Prevertebral soft tissues are normal.    CT MF 9/7:No acute maxillofacial bone fracture. Chronic appearing left nasal bone deformity. Right facial and infraorbital soft tissue swelling.  Intraorbital contents including the globes, extra ocular muscles, and optic nerves are intact. No retrobulbar hematoma.  Minimal mucosal thickening along the maxillary sinus alveolar recesses cc. No hemorrhagic air-fluid level. No temporomandibular joint dislocation. Mandible is intact.    CT IAC 9/7: Acute posteriorly displaced fracture of the anterior wall of the left external auditory canal with partial opacification of the external auditory canal. Visualized portions of the tympanic membrane appear intact. The bilateral middle ear structures including the scuti, tegmen tympani, ossicles, and bony coverings of the lateral semicircular canals and facial nerves are intact. The bilateral inner ear structures including the internal auditory canals, semicircular canals, cochleae, and vestibules are unremarkable. The bilateral mastoid air cells are clear. Right external auditory canal is unremarkable. HPI: 65M PMH HF due to NICM HM2 LVAD (2017) on coumadin, TV annuloplasty ring, SIADH, CKD3, presenting after fall. States earlier that day he had BRBPR after passing a bowel movement. Soon after that episode he  was reaching for ensure in the refrigerator and fell onto the floor. Patient does not remember falling, reports waking up on the floor and was helped by his friend who he lives to get into bed, his friend then called a taxi to bring him to the ED.  While in the ED  noted to have left EAC fracture with hemorrhage. Admits to feeling tinnitus and having facial swelling. Otherwise denies dizziness/vertigo/nystagmus, N/V, fever/chills, cough, CP/SOB/ Palpitations/ Abdominal Pain/Diarrhea.     PAST MEDICAL HISTORY:  ACC/AHA stage D systolic heart failure   Anticoagulation goal of INR 2.0 to 2.5   Bleeding hemorrhoids   BPH with urinary obstruction   CAD (coronary artery disease)   CHF (congestive heart failure)   CKD (chronic kidney disease), stage III   Claudication   Clavicle fracture   Depression   Falls   GI bleed   H/O epistaxis   Hemorrhoids   History of 2019 novel coronavirus disease (COVID-19) april 2020  Iron deficiency anemia   Peripheral arterial disease   Pleural effusion   Vertigo.     PAST SURGICAL HISTORY:  S/P endoscopy   S/P TVR (tricuspid valve repair)   S/P ventricular assist device.     FAMILY HISTORY:  No pertinent family history in first degree relatives.   No Pertinent Family History in first degree relatives of: cardiac disease.    Allergies.  No Known Allergies    ROS:   ENT: all negative except as noted in HPI.   CV: denies palpitations.  Pulm: denies SOB, cough, hemoptysis.  GI: denies change in apetite, indigestion, n/v.  : denies pertinent urinary symptoms, urgency.  Neuro: denies numbness/tingling, loss of sensation.  Psych: denies anxiety.  MS: denies muscle weakness, instability.  Heme: denies easy bruising or bleeding.  Endo: denies heat/cold intolerance, excessive sweating.  Vascular: denies LE edema.      Medications:  acetaminophen   Tablet .. 650 milliGRAM(s) Oral every 6 hours PRN  aMIOdarone    Tablet 200 milliGRAM(s) Oral daily  cyanocobalamin 1000 MICROGram(s) Oral daily  ferrous    sulfate 325 milliGRAM(s) Oral three times a day  finasteride 5 milliGRAM(s) Oral daily  folic acid 1 milliGRAM(s) Oral daily  influenza   Vaccine 0.5 milliLiter(s) IntraMuscular once  lisinopril 5 milliGRAM(s) Oral daily  metoprolol succinate ER 25 milliGRAM(s) Oral daily  mirtazapine 7.5 milliGRAM(s) Oral at bedtime  Ofloxacin 0.3% Otic Solution 5 Drop(s) 5 Drop(s) Left Ear two times a day  pantoprazole    Tablet 40 milliGRAM(s) Oral every 12 hours  senna 2 Tablet(s) Oral at bedtime  spironolactone 25 milliGRAM(s) Oral daily      Vitals:  Vital Signs Last 24 Hours  T(C): 37.2 (09-08-20 @ 07:47), Max: 37.4 (09-08-20 @ 00:03)  HR: 82 (09-08-20 @ 07:47) (80 - 88)  BP: --  RR: 18 (09-08-20 @ 07:47) (18 - 26)  SpO2: 97% (09-08-20 @ 07:47) (97% - 100%)    Tele: SR     I&O's Summary    07 Sep 2020 07:01  -  08 Sep 2020 07:00  --------------------------------------------------------  IN: 700 mL / OUT: 1575 mL / NET: -875 mL    08 Sep 2020 07:01  -  08 Sep 2020 09:11  --------------------------------------------------------  IN: 100 mL / OUT: 300 mL / NET: -200 mL      Physical Exam  General: No distress. Comfortable.  HEENT: Right facial swelling along with infra orbital ecchymosis. left ear noted to have packing in the canal    Neck: Neck supple. JVP not appreciated. No masses  Chest: Clear to auscultation bilaterally  CV: +VAD hum  Abdomen: Soft, non-distended, non-tender  Skin: No rashes or skin breakdown  Neurology: Alert and oriented times three. Sensation intact  Psych: Affect normal    LVAD Interrogation  VAD TYPE HM 2  Speed  8800  Flow 4.4  Power 4.7  PI  6.7  Assessment of driveline exit site: driveline dressing c/d/i  Programming changes: no changes made  Alarms: noted to have multiple low flow alarms with speed drop changes (low speed 8500)     Labs:                        7.1    8.83  )-----------( 155      ( 08 Sep 2020 03:00 )             23.0     09-08    131<L>  |  97  |  26<H>  ----------------------------<  96  4.4   |  23  |  1.09    Ca    8.9      08 Sep 2020 03:00    TPro  6.7  /  Alb  4.0  /  TBili  0.4  /  DBili  x   /  AST  25  /  ALT  13  /  AlkPhos  52  09-08    PT/INR - ( 08 Sep 2020 03:00 )   PT: 30.8 sec;   INR: 2.71 ratio         PTT - ( 08 Sep 2020 03:00 )  PTT:38.5 sec    Lactate Dehydrogenase, Serum: 229 U/L (09-08 @ 03:00)  Lactate Dehydrogenase, Serum: 311 U/L (09-07 @ 17:02)      Imaging Studies     Chest 1 View AP/PA 9/7: clear lungs    CT Head 9/7: CT head: There is no CT evidence for acute intracranial hemorrhage or midline shift. No extra-axial fluid collections. CSF spaces are normal in size and configuration.    CT cervical 9/7: Cervical vertebral column is normal in height with straightening. No fracture or subluxation. Moderate degenerative disc disease extending from C3-C6 with uncovertebral spurring, causing multilevel at least moderate neural foraminal narrowing bilaterally. Prevertebral soft tissues are normal.    CT MF 9/7:No acute maxillofacial bone fracture. Chronic appearing left nasal bone deformity. Right facial and infraorbital soft tissue swelling.  Intraorbital contents including the globes, extra ocular muscles, and optic nerves are intact. No retrobulbar hematoma.  Minimal mucosal thickening along the maxillary sinus alveolar recesses cc. No hemorrhagic air-fluid level. No temporomandibular joint dislocation. Mandible is intact.    CT IAC 9/7: Acute posteriorly displaced fracture of the anterior wall of the left external auditory canal with partial opacification of the external auditory canal. Visualized portions of the tympanic membrane appear intact. The bilateral middle ear structures including the scuti, tegmen tympani, ossicles, and bony coverings of the lateral semicircular canals and facial nerves are intact. The bilateral inner ear structures including the internal auditory canals, semicircular canals, cochleae, and vestibules are unremarkable. The bilateral mastoid air cells are clear. Right external auditory canal is unremarkable. HPI: 65M PMH HF due to NICM HM2 LVAD (2017) on coumadin, TV annuloplasty ring, SIADH, CKD3, presenting after fall. States earlier that day he had BRBPR after passing a bowel movement. Soon after that episode he  was reaching for ensure in the refrigerator and fell onto the floor. Patient does not remember falling, reports waking up on the floor and was helped by his friend who he lives to get into bed, his friend then called a taxi to bring him to the ED.  While in the ED  noted to have left EAC fracture with hemorrhage. Admits to feeling tinnitus and having facial swelling. Otherwise denies dizziness/vertigo/nystagmus, N/V, fever/chills, cough, CP/SOB/ Palpitations/ Abdominal Pain/Diarrhea.     PAST MEDICAL HISTORY:  ACC/AHA stage D systolic heart failure   Anticoagulation goal of INR 2.0 to 2.5   Bleeding hemorrhoids   BPH with urinary obstruction   CAD (coronary artery disease)   CHF (congestive heart failure)   CKD (chronic kidney disease), stage III   Claudication   Clavicle fracture   Depression   Falls   GI bleed   H/O epistaxis   Hemorrhoids   History of 2019 novel coronavirus disease (COVID-19) april 2020  Iron deficiency anemia   Peripheral arterial disease   Pleural effusion   Vertigo.     PAST SURGICAL HISTORY:  S/P endoscopy   S/P TVR (tricuspid valve repair)   S/P ventricular assist device.     FAMILY HISTORY:  No pertinent family history in first degree relatives.   No Pertinent Family History in first degree relatives of: cardiac disease.    Allergies.  No Known Allergies    ROS:   ENT: all negative except as noted in HPI.   CV: denies palpitations.  Pulm: denies SOB, cough, hemoptysis.  GI: denies change in apetite, indigestion, n/v.  : denies pertinent urinary symptoms, urgency.  Neuro: denies numbness/tingling, loss of sensation.  Psych: denies anxiety.  MS: denies muscle weakness, instability.  Heme: denies easy bruising or bleeding.  Endo: denies heat/cold intolerance, excessive sweating.  Vascular: denies LE edema.      Medications:  acetaminophen   Tablet .. 650 milliGRAM(s) Oral every 6 hours PRN  aMIOdarone    Tablet 200 milliGRAM(s) Oral daily  cyanocobalamin 1000 MICROGram(s) Oral daily  ferrous    sulfate 325 milliGRAM(s) Oral three times a day  finasteride 5 milliGRAM(s) Oral daily  folic acid 1 milliGRAM(s) Oral daily  influenza   Vaccine 0.5 milliLiter(s) IntraMuscular once  lisinopril 5 milliGRAM(s) Oral daily  metoprolol succinate ER 25 milliGRAM(s) Oral daily  mirtazapine 7.5 milliGRAM(s) Oral at bedtime  Ofloxacin 0.3% Otic Solution 5 Drop(s) 5 Drop(s) Left Ear two times a day  pantoprazole    Tablet 40 milliGRAM(s) Oral every 12 hours  senna 2 Tablet(s) Oral at bedtime  spironolactone 25 milliGRAM(s) Oral daily      Vitals:  ICU Vital Signs Last 24 Hrs  T(C): 37.8 (08 Sep 2020 19:08), Max: 37.8 (08 Sep 2020 19:08)  T(F): 100 (08 Sep 2020 19:08), Max: 100 (08 Sep 2020 19:08)  HR: 91 (08 Sep 2020 18:25) (80 - 91)  BP: 84/63 (08 Sep 2020 14:09) (84/63 - 86/61)  BP(mean): 70 (08 Sep 2020 18:25) (70 - 80)  RR: 18 (08 Sep 2020 18:25) (16 - 20)  SpO2: 95% (08 Sep 2020 18:25) (95% - 100%)    Vital Signs Last 24 Hours  T(C): 37.2 (09-08-20 @ 07:47), Max: 37.4 (09-08-20 @ 00:03)  HR: 82 (09-08-20 @ 07:47) (80 - 88)  BP: --  RR: 18 (09-08-20 @ 07:47) (18 - 26)  SpO2: 97% (09-08-20 @ 07:47) (97% - 100%)    Tele: SR     I&O's Summary    07 Sep 2020 07:01  -  08 Sep 2020 07:00  --------------------------------------------------------  IN: 700 mL / OUT: 1575 mL / NET: -875 mL    08 Sep 2020 07:01  -  08 Sep 2020 09:11  --------------------------------------------------------  IN: 100 mL / OUT: 300 mL / NET: -200 mL      Physical Exam  General: No distress. Comfortable.  HEENT: Right facial swelling along with infra orbital ecchymosis. left ear noted to have packing in the canal    Neck: Neck supple. JVP not appreciated. No masses  Chest: Clear to auscultation bilaterally  CV: +VAD hum  Abdomen: Soft, non-distended, mild left sided tenderness without rebound or guarding  Skin: No rashes or skin breakdown  Neurology: Alert and oriented times three. Sensation intact  Psych: Affect normal    LVAD Interrogation  VAD TYPE HM 2  Speed  8800  Flow 4.4  Power 4.7  PI  6.7  Assessment of driveline exit site: driveline dressing c/d/i  Programming changes: no changes made  Alarms: noted to have multiple low flow alarms with speed drop changes (low speed 8500)     Labs:                        7.1    8.83  )-----------( 155      ( 08 Sep 2020 03:00 )             23.0     09-08    131<L>  |  97  |  26<H>  ----------------------------<  96  4.4   |  23  |  1.09    Ca    8.9      08 Sep 2020 03:00    TPro  6.7  /  Alb  4.0  /  TBili  0.4  /  DBili  x   /  AST  25  /  ALT  13  /  AlkPhos  52  09-08    PT/INR - ( 08 Sep 2020 03:00 )   PT: 30.8 sec;   INR: 2.71 ratio         PTT - ( 08 Sep 2020 03:00 )  PTT:38.5 sec    Lactate Dehydrogenase, Serum: 229 U/L (09-08 @ 03:00)  Lactate Dehydrogenase, Serum: 311 U/L (09-07 @ 17:02)      Imaging Studies     Chest 1 View AP/PA 9/7: clear lungs    CT Head 9/7: CT head: There is no CT evidence for acute intracranial hemorrhage or midline shift. No extra-axial fluid collections. CSF spaces are normal in size and configuration.    CT cervical 9/7: Cervical vertebral column is normal in height with straightening. No fracture or subluxation. Moderate degenerative disc disease extending from C3-C6 with uncovertebral spurring, causing multilevel at least moderate neural foraminal narrowing bilaterally. Prevertebral soft tissues are normal.    CT MF 9/7:No acute maxillofacial bone fracture. Chronic appearing left nasal bone deformity. Right facial and infraorbital soft tissue swelling.  Intraorbital contents including the globes, extra ocular muscles, and optic nerves are intact. No retrobulbar hematoma.  Minimal mucosal thickening along the maxillary sinus alveolar recesses cc. No hemorrhagic air-fluid level. No temporomandibular joint dislocation. Mandible is intact.    CT IAC 9/7: Acute posteriorly displaced fracture of the anterior wall of the left external auditory canal with partial opacification of the external auditory canal. Visualized portions of the tympanic membrane appear intact. The bilateral middle ear structures including the scuti, tegmen tympani, ossicles, and bony coverings of the lateral semicircular canals and facial nerves are intact. The bilateral inner ear structures including the internal auditory canals, semicircular canals, cochleae, and vestibules are unremarkable. The bilateral mastoid air cells are clear. Right external auditory canal is unremarkable.

## 2020-09-08 NOTE — CONSULT NOTE ADULT - PROBLEM SELECTOR RECOMMENDATION 9
- s/p fall and questionable syncopal episode on 9/7  - CT of IAC shows acute posteriorly displaced fracture of the anterior wall of the left external auditory canal with partial opacification of the external auditory canal.  - ENT consulted, appreciate recommendations   - Left ear cauterized and packed with surgicel x 1, packing to be removed on 9/10 by ENT. - s/p fall and questionable syncopal episode on 9/7  - CT of IAC shows acute posteriorly displaced fracture of the anterior wall of the left external auditory canal with partial opacification of the external auditory canal.  - ENT consulted, appreciate recommendations   - Left ear cauterized and packed with surgicel x 1, packing to be removed by ENT 9/10. - s/p fall and questionable syncopal episode on 9/7  - CT of IAC shows acute posteriorly displaced fracture of the anterior wall of the left external auditory canal with partial opacification of the external auditory canal.  - ENT consulted, appreciate recommendations   - Left ear cauterized and packed with surgicel x 1, packing to be removed by ENT 9/10.  - Will further control INR and BP to reduce bleeding

## 2020-09-08 NOTE — CONSULT NOTE ADULT - PROBLEM SELECTOR RECOMMENDATION 3
- Continue at current speed (8800)  - Re-admitted with supra-therapeutic INR of 5.14, s/p FFP 9/7. Currently hold Coumadin and ASA for bleeding.    - Continue to monitor LDH levels daily - Continue at current speed (8800)  - Re-admitted with supra-therapeutic INR of 5.14, s/p FFP 9/7. Currently holding Coumadin and ASA for bleeding.    - Continue to monitor LDH levels daily - Continue at current speed (8800)  - Re-admitted with supra-therapeutic INR of 5.14, s/p 2unts of FFP (last on 9/8).   - Continue to hold Coumadin and ASA for bleeding.    - Continue to monitor LDH levels daily

## 2020-09-08 NOTE — CONSULT NOTE ADULT - SUBJECTIVE AND OBJECTIVE BOX
CC: Left Ear Hemorrhage s/p Fall.    HPI:     PAST MEDICAL & SURGICAL HISTORY:  History of 2019 novel coronavirus disease (COVID-19): april 2020  Pleural effusion  Depression  CAD (coronary artery disease)  CHF (congestive heart failure)  S/P ventricular assist device  S/P TVR (tricuspid valve repair)    Allergies.  No Known Allergies    Intolerances.  MEDICATIONS  (STANDING):  influenza   Vaccine 0.5 milliLiter(s) IntraMuscular once  MEDICATIONS  (PRN):     Social History: No smoking/ alcohol/drug use.   Family history: None.  ROS:   ENT: all negative except as noted in HPI.   CV: denies palpitations.  Pulm: denies SOB, cough, hemoptysis.  GI: denies change in apetite, indigestion, n/v.  : denies pertinent urinary symptoms, urgency.  Neuro: denies numbness/tingling, loss of sensation.  Psych: denies anxiety.  MS: denies muscle weakness, instability.  Heme: denies easy bruising or bleeding.  Endo: denies heat/cold intolerance, excessive sweating.  Vascular: denies LE edema.    Vital Signs Last 24 Hrs  T(C): 37.3 (07 Sep 2020 22:07), Max: 37.3 (07 Sep 2020 22:07)  T(F): 99.2 (07 Sep 2020 22:07), Max: 99.2 (07 Sep 2020 22:07)  HR: 85 (07 Sep 2020 22:07) (80 - 88)  BP: --  BP(mean): 78 (07 Sep 2020 22:07) (78 - 82)  RR: 20 (07 Sep 2020 22:07) (20 - 26)  SpO2: 100% (07 Sep 2020 22:07) (100% - 100%)                          6.3    11.30 )-----------( 209      ( 07 Sep 2020 17:02 )             21.9    09-07    131<L>  |  98  |  31<H>  ----------------------------<  129<H>  4.9   |  20<L>  |  1.28    Ca    9.0      07 Sep 2020 17:02     PT/INR - ( 07 Sep 2020 17:02 )   PT: 56.6 sec;   INR: 5.14 ratio     PTT - ( 07 Sep 2020 17:02 )  PTT:40.4 sec    PHYSICAL EXAM:  Gen: NAD, On RA.   Skin: Right infra orbital Ecchymosis.   Head: Normocephalic, Atraumatic.  Face: Left facial swelling. No erythema, or fluctuance. Parotid glands soft without mass.  Eyes: no scleral injection.  Ears: Right - ear canal clear, TM intact without effusion or erythema. No evidence of any fluid drainage. No mastoid tenderness, erythema, or ear bulging          Left - Blood and blood clots on EAC, unable to visualize TM due to blood in EAC, blood suctioned out, unable to visualize the source of bleeding for cauterization, bleeding controlled with Surgicel x1. No mastoid tenderness, erythema, or ear bulging  Nose: Nares bilaterally patent, no discharge.  Mouth: No Stridor / Drooling / Trismus.  Mucosa moist, tongue/uvula midline, oropharynx clear.  Neck: Flat, supple, no lymphadenopathy, trachea midline, no masses.  Lymphatic: No lymphadenopathy.  Resp: breathing easily, no stridor.  CV: no peripheral edema/cyanosis.  GI: nondistended.   Peripheral vascular: no JVD or edema.  Neuro: facial nerve intact, no facial droop.    IMAGING/ADDITIONAL STUDIES:   CT Head [9/7]: CT head: There is no CT evidence for acute intracranial hemorrhage or midline shift. No extra-axial fluid collections. CSF spaces are normal in size and configuration.    CT cervical: [9/7]: Cervical vertebral column is normal in height with straightening. No fracture or subluxation. Moderate degenerative disc disease extending from C3-C6 with uncovertebral spurring, causing multilevel at least moderate neural foraminal narrowing bilaterally. Prevertebral soft tissues are normal.    CT MF [9/7]:No acute maxillofacial bone fracture. Chronic appearing left nasal bone deformity. Right facial and infraorbital soft tissue swelling.  Intraorbital contents including the globes, extra ocular muscles, and optic nerves are intact. No retrobulbar hematoma.  Minimal mucosal thickening along the maxillary sinus alveolar recesses cc. No hemorrhagic air-fluid level. No temporomandibular joint dislocation. Mandible is intact.    CT IAC [9/7]: Acute posteriorly displaced fracture of the anterior wall of the left external auditory canal with partial opacification of the external auditory canal. Visualized portions of the tympanic membrane appear intact. The bilateral middle ear structures including the scuti, tegmen tympani, ossicles, and bony coverings of the lateral semicircular canals and facial nerves are intact. The bilateral inner ear structures including the internal auditory canals, semicircular canals, cochleae, and vestibules are unremarkable. The bilateral mastoid air cells are clear. Right external auditory canal is unremarkable. CC: Left Ear Hemorrhage s/p Fall.    HPI: 66 YO M with PMH of ACC/AHA stage D HF due to NICM HM2 LVAD , TV annuloplasty ring 9/12/17 as destination therapy due to severe peripheral artery disease with significant stenosis  SIADH, Depression, CKD-3 with hyperkalemia, past E. coli UTIs and recently hospitalized in April due to COVID-19. Pt presents to [facility] ED s/p fall earlier today. Pt states he was going to fridge to get ensure and fell; is unsure if he syncopized. Was helped to bed by his friend he lives with. ENT was consulted for evaluation of Left EAC fracture and hemorrhage. Pt admits to feeling tinnitus, pain on the affected side. Also c/o Right facial swelling. Takes Coumadin at home. Otherwise denies Dizziness/Vertigo/Nystagmus, dysphagia/  odynophagia N/V, fever/chills, cough/ rhinorrhea/ salty/ metallic taste in mouth, CP/SOB/ Palpitations/  Diaphoresis, Abdominal Pain/Diarrhea, recent travel/sick contacts.     PAST MEDICAL & SURGICAL HISTORY:  History of 2019 novel coronavirus disease (COVID-19): april 2020  Pleural effusion  Depression  CAD (coronary artery disease)  CHF (congestive heart failure)  S/P ventricular assist device  S/P TVR (tricuspid valve repair)    Allergies.  No Known Allergies    Intolerances.  MEDICATIONS  (STANDING):  influenza   Vaccine 0.5 milliLiter(s) IntraMuscular once  MEDICATIONS  (PRN):     Social History: No smoking/ alcohol/drug use.   Family history: None.  ROS:   ENT: all negative except as noted in HPI.   CV: denies palpitations.  Pulm: denies SOB, cough, hemoptysis.  GI: denies change in apetite, indigestion, n/v.  : denies pertinent urinary symptoms, urgency.  Neuro: denies numbness/tingling, loss of sensation.  Psych: denies anxiety.  MS: denies muscle weakness, instability.  Heme: denies easy bruising or bleeding.  Endo: denies heat/cold intolerance, excessive sweating.  Vascular: denies LE edema.    Vital Signs Last 24 Hrs  T(C): 37.3 (07 Sep 2020 22:07), Max: 37.3 (07 Sep 2020 22:07)  T(F): 99.2 (07 Sep 2020 22:07), Max: 99.2 (07 Sep 2020 22:07)  HR: 85 (07 Sep 2020 22:07) (80 - 88)  BP: --  BP(mean): 78 (07 Sep 2020 22:07) (78 - 82)  RR: 20 (07 Sep 2020 22:07) (20 - 26)  SpO2: 100% (07 Sep 2020 22:07) (100% - 100%)                          6.3    11.30 )-----------( 209      ( 07 Sep 2020 17:02 )             21.9    09-07    131<L>  |  98  |  31<H>  ----------------------------<  129<H>  4.9   |  20<L>  |  1.28    Ca    9.0      07 Sep 2020 17:02     PT/INR - ( 07 Sep 2020 17:02 )   PT: 56.6 sec;   INR: 5.14 ratio     PTT - ( 07 Sep 2020 17:02 )  PTT:40.4 sec    PHYSICAL EXAM:  Gen: NAD, On RA.   Skin: Right infra orbital Ecchymosis.   Head: Normocephalic, Atraumatic.  Face: Left facial swelling. No erythema, or fluctuance. Parotid glands soft without mass.  Eyes: no scleral injection.  Ears: Right - ear canal clear, TM intact without effusion or erythema. No evidence of any fluid drainage. No mastoid tenderness, erythema, or ear bulging          Left - Blood and blood clots on EAC, unable to visualize TM due to blood in EAC, blood suctioned out, unable to visualize the source of bleeding for cauterization, bleeding controlled with Surgicel x1. No mastoid tenderness, erythema, or ear bulging  Nose: Nares bilaterally patent, no discharge.  Mouth: No Stridor / Drooling / Trismus.  Mucosa moist, tongue/uvula midline, oropharynx clear.  Neck: Flat, supple, no lymphadenopathy, trachea midline, no masses.  Lymphatic: No lymphadenopathy.  Resp: breathing easily, no stridor.  CV: no peripheral edema/cyanosis.  GI: nondistended.   Peripheral vascular: no JVD or edema.  Neuro: facial nerve intact, no facial droop.    IMAGING/ADDITIONAL STUDIES:   CT Head [9/7]: CT head: There is no CT evidence for acute intracranial hemorrhage or midline shift. No extra-axial fluid collections. CSF spaces are normal in size and configuration.    CT cervical: [9/7]: Cervical vertebral column is normal in height with straightening. No fracture or subluxation. Moderate degenerative disc disease extending from C3-C6 with uncovertebral spurring, causing multilevel at least moderate neural foraminal narrowing bilaterally. Prevertebral soft tissues are normal.    CT MF [9/7]:No acute maxillofacial bone fracture. Chronic appearing left nasal bone deformity. Right facial and infraorbital soft tissue swelling.  Intraorbital contents including the globes, extra ocular muscles, and optic nerves are intact. No retrobulbar hematoma.  Minimal mucosal thickening along the maxillary sinus alveolar recesses cc. No hemorrhagic air-fluid level. No temporomandibular joint dislocation. Mandible is intact.    CT IAC [9/7]: Acute posteriorly displaced fracture of the anterior wall of the left external auditory canal with partial opacification of the external auditory canal. Visualized portions of the tympanic membrane appear intact. The bilateral middle ear structures including the scuti, tegmen tympani, ossicles, and bony coverings of the lateral semicircular canals and facial nerves are intact. The bilateral inner ear structures including the internal auditory canals, semicircular canals, cochleae, and vestibules are unremarkable. The bilateral mastoid air cells are clear. Right external auditory canal is unremarkable. CC: Left Ear Hemorrhage s/p Fall.    HPI: 66 YO M with PMH of ACC/AHA stage D HF due to NICM HM2 LVAD , TV annuloplasty ring 9/12/17 as destination therapy due to severe peripheral artery disease with significant stenosis  SIADH, Depression, CKD-3 with hyperkalemia, past E. coli UTIs and recently hospitalized in April due to COVID-19. Pt presents to [facility] ED s/p fall earlier today. Pt states he was going to fridge to get ensure and fell; is unsure if he syncopized. Was helped to bed by his friend he lives with. ENT was consulted for evaluation of Left EAC fracture and hemorrhage. Pt admits to feeling tinnitus, pain on the affected side. Also c/o Right facial swelling. Takes Coumadin at home. Otherwise denies Dizziness/Vertigo/Nystagmus, dysphagia/  odynophagia N/V, fever/chills, cough/ rhinorrhea/ salty/ metallic taste in mouth, CP/SOB/ Palpitations/  Diaphoresis, Abdominal Pain/Diarrhea, recent travel/sick contacts.     PAST MEDICAL & SURGICAL HISTORY:  History of 2019 novel coronavirus disease (COVID-19): april 2020  Pleural effusion  Depression  CAD (coronary artery disease)  CHF (congestive heart failure)  S/P ventricular assist device  S/P TVR (tricuspid valve repair)    Allergies.  No Known Allergies    Intolerances.  MEDICATIONS  (STANDING):  influenza   Vaccine 0.5 milliLiter(s) IntraMuscular once  MEDICATIONS  (PRN):     Social History: No smoking/ alcohol/drug use.   Family history: None.  ROS:   ENT: all negative except as noted in HPI.   CV: denies palpitations.  Pulm: denies SOB, cough, hemoptysis.  GI: denies change in apetite, indigestion, n/v.  : denies pertinent urinary symptoms, urgency.  Neuro: denies numbness/tingling, loss of sensation.  Psych: denies anxiety.  MS: denies muscle weakness, instability.  Heme: denies easy bruising or bleeding.  Endo: denies heat/cold intolerance, excessive sweating.  Vascular: denies LE edema.    Vital Signs Last 24 Hrs  T(C): 37.3 (07 Sep 2020 22:07), Max: 37.3 (07 Sep 2020 22:07)  T(F): 99.2 (07 Sep 2020 22:07), Max: 99.2 (07 Sep 2020 22:07)  HR: 85 (07 Sep 2020 22:07) (80 - 88)  BP: --  BP(mean): 78 (07 Sep 2020 22:07) (78 - 82)  RR: 20 (07 Sep 2020 22:07) (20 - 26)  SpO2: 100% (07 Sep 2020 22:07) (100% - 100%)                          6.3    11.30 )-----------( 209      ( 07 Sep 2020 17:02 )             21.9    09-07    131<L>  |  98  |  31<H>  ----------------------------<  129<H>  4.9   |  20<L>  |  1.28    Ca    9.0      07 Sep 2020 17:02     PT/INR - ( 07 Sep 2020 17:02 )   PT: 56.6 sec;   INR: 5.14 ratio     PTT - ( 07 Sep 2020 17:02 )  PTT:40.4 sec    PHYSICAL EXAM:  Gen: NAD, On RA.   Skin: Right infra orbital Ecchymosis.   Head: Normocephalic, Atraumatic.  Face: Left facial swelling. No erythema, or fluctuance. Parotid glands soft without mass.  Eyes: no scleral injection.  Ears: Right - ear canal clear, TM intact without effusion or erythema. No evidence of any fluid drainage. No mastoid tenderness, erythema, or ear bulging          Left - Blood and blood clots on EAC, unable to visualize TM due to blood in EAC, blood suctioned out, cauterized bleeding lesion, bleeding controlled with Surgicel x1. No mastoid tenderness, erythema, or ear bulging.  Nose: Nares bilaterally patent, no discharge.  Mouth: No Stridor / Drooling / Trismus.  Mucosa moist, tongue/uvula midline, oropharynx clear.  Neck: Flat, supple, no lymphadenopathy, trachea midline, no masses.  Lymphatic: No lymphadenopathy.  Resp: breathing easily, no stridor.  CV: no peripheral edema/cyanosis.  GI: nondistended.   Peripheral vascular: no JVD or edema.  Neuro: facial nerve intact, no facial droop.    IMAGING/ADDITIONAL STUDIES:   CT Head [9/7]: CT head: There is no CT evidence for acute intracranial hemorrhage or midline shift. No extra-axial fluid collections. CSF spaces are normal in size and configuration.    CT cervical: [9/7]: Cervical vertebral column is normal in height with straightening. No fracture or subluxation. Moderate degenerative disc disease extending from C3-C6 with uncovertebral spurring, causing multilevel at least moderate neural foraminal narrowing bilaterally. Prevertebral soft tissues are normal.    CT MF [9/7]:No acute maxillofacial bone fracture. Chronic appearing left nasal bone deformity. Right facial and infraorbital soft tissue swelling.  Intraorbital contents including the globes, extra ocular muscles, and optic nerves are intact. No retrobulbar hematoma.  Minimal mucosal thickening along the maxillary sinus alveolar recesses cc. No hemorrhagic air-fluid level. No temporomandibular joint dislocation. Mandible is intact.    CT IAC [9/7]: Acute posteriorly displaced fracture of the anterior wall of the left external auditory canal with partial opacification of the external auditory canal. Visualized portions of the tympanic membrane appear intact. The bilateral middle ear structures including the scuti, tegmen tympani, ossicles, and bony coverings of the lateral semicircular canals and facial nerves are intact. The bilateral inner ear structures including the internal auditory canals, semicircular canals, cochleae, and vestibules are unremarkable. The bilateral mastoid air cells are clear. Right external auditory canal is unremarkable. CC: Left Ear Hemorrhage s/p Fall.    HPI: 66 YO M with PMH of ACC/AHA stage D HF due to NICM HM2 LVAD , TV annuloplasty ring 9/12/17 as destination therapy due to severe peripheral artery disease with significant stenosis  SIADH, Depression, CKD-3 with hyperkalemia, past E. coli UTIs and recently hospitalized in April due to COVID-19. Pt presents to [facility] ED s/p fall earlier today. Pt states he was going to fridge to get ensure and fell; is unsure if he syncopized. Was helped to bed by his friend he lives with. ENT was consulted for evaluation of Left EAC fracture and hemorrhage. Pt admits to feeling tinnitus, pain on the affected side. Also c/o Right facial swelling. Takes Coumadin at home. Otherwise denies Dizziness/Vertigo/Nystagmus, dysphagia/  odynophagia N/V, fever/chills, cough/ rhinorrhea/ salty/ metallic taste in mouth, CP/SOB/ Palpitations/  Diaphoresis, Abdominal Pain/Diarrhea, recent travel/sick contacts.     PAST MEDICAL & SURGICAL HISTORY:  History of 2019 novel coronavirus disease (COVID-19): april 2020  Pleural effusion  Depression  CAD (coronary artery disease)  CHF (congestive heart failure)  S/P ventricular assist device  S/P TVR (tricuspid valve repair)    Allergies.  No Known Allergies    Intolerances.  MEDICATIONS  (STANDING):  influenza   Vaccine 0.5 milliLiter(s) IntraMuscular once  MEDICATIONS  (PRN):     Social History: No smoking/ alcohol/drug use.   Family history: None.  ROS:   ENT: all negative except as noted in HPI.   CV: denies palpitations.  Pulm: denies SOB, cough, hemoptysis.  GI: denies change in apetite, indigestion, n/v.  : denies pertinent urinary symptoms, urgency.  Neuro: denies numbness/tingling, loss of sensation.  Psych: denies anxiety.  MS: denies muscle weakness, instability.  Heme: denies easy bruising or bleeding.  Endo: denies heat/cold intolerance, excessive sweating.  Vascular: denies LE edema.    Vital Signs Last 24 Hrs  T(C): 37.3 (07 Sep 2020 22:07), Max: 37.3 (07 Sep 2020 22:07)  T(F): 99.2 (07 Sep 2020 22:07), Max: 99.2 (07 Sep 2020 22:07)  HR: 85 (07 Sep 2020 22:07) (80 - 88)  BP: --  BP(mean): 78 (07 Sep 2020 22:07) (78 - 82)  RR: 20 (07 Sep 2020 22:07) (20 - 26)  SpO2: 100% (07 Sep 2020 22:07) (100% - 100%)                          6.3    11.30 )-----------( 209      ( 07 Sep 2020 17:02 )             21.9    09-07    131<L>  |  98  |  31<H>  ----------------------------<  129<H>  4.9   |  20<L>  |  1.28    Ca    9.0      07 Sep 2020 17:02     PT/INR - ( 07 Sep 2020 17:02 )   PT: 56.6 sec;   INR: 5.14 ratio     PTT - ( 07 Sep 2020 17:02 )  PTT:40.4 sec    PHYSICAL EXAM:  Gen: NAD, On RA.   Skin: Right infra orbital Ecchymosis.   Head: Normocephalic, Atraumatic.  Face: Right facial swelling. No erythema, or fluctuance. Parotid glands soft without mass.  Eyes: no scleral injection.  Ears: Right - ear canal clear, TM intact without effusion or erythema. No evidence of any fluid drainage. No mastoid tenderness, erythema, or ear bulging          Left - Blood and blood clots on EAC, unable to visualize TM due to blood in EAC, blood suctioned out, cauterized bleeding lesion, bleeding controlled with Surgicel x1. No mastoid tenderness, erythema, or ear bulging.  Nose: Nares bilaterally patent, no discharge.  Mouth: No Stridor / Drooling / Trismus.  Mucosa moist, tongue/uvula midline, oropharynx clear.  Neck: Flat, supple, no lymphadenopathy, trachea midline, no masses.  Lymphatic: No lymphadenopathy.  Resp: breathing easily, no stridor.  CV: no peripheral edema/cyanosis.  GI: nondistended.   Peripheral vascular: no JVD or edema.  Neuro: facial nerve intact, no facial droop.    IMAGING/ADDITIONAL STUDIES:   CT Head [9/7]: CT head: There is no CT evidence for acute intracranial hemorrhage or midline shift. No extra-axial fluid collections. CSF spaces are normal in size and configuration.    CT cervical: [9/7]: Cervical vertebral column is normal in height with straightening. No fracture or subluxation. Moderate degenerative disc disease extending from C3-C6 with uncovertebral spurring, causing multilevel at least moderate neural foraminal narrowing bilaterally. Prevertebral soft tissues are normal.    CT MF [9/7]:No acute maxillofacial bone fracture. Chronic appearing left nasal bone deformity. Right facial and infraorbital soft tissue swelling.  Intraorbital contents including the globes, extra ocular muscles, and optic nerves are intact. No retrobulbar hematoma.  Minimal mucosal thickening along the maxillary sinus alveolar recesses cc. No hemorrhagic air-fluid level. No temporomandibular joint dislocation. Mandible is intact.    CT IAC [9/7]: Acute posteriorly displaced fracture of the anterior wall of the left external auditory canal with partial opacification of the external auditory canal. Visualized portions of the tympanic membrane appear intact. The bilateral middle ear structures including the scuti, tegmen tympani, ossicles, and bony coverings of the lateral semicircular canals and facial nerves are intact. The bilateral inner ear structures including the internal auditory canals, semicircular canals, cochleae, and vestibules are unremarkable. The bilateral mastoid air cells are clear. Right external auditory canal is unremarkable.

## 2020-09-08 NOTE — PROGRESS NOTE ADULT - SUBJECTIVE AND OBJECTIVE BOX
VITAL SIGNS    Telemetry:  nsr 80  Vital Signs Last 24 Hrs  T(C): 37.1 (09-08-20 @ 17:00), Max: 37.4 (09-08-20 @ 00:03)  T(F): 98.8 (09-08-20 @ 17:00), Max: 99.4 (09-08-20 @ 00:03)  HR: 83 (09-08-20 @ 17:00) (80 - 89)  BP: 84/63 (09-08-20 @ 14:09) (84/63 - 86/61)  RR: 16 (09-08-20 @ 17:00) (16 - 25)  SpO2: 98% (09-08-20 @ 17:00) (96% - 100%)                       9.1<L>                x    | x    | x            8.48  >-----------< 164     ------------------------< x                     28.6<L>                x    | x    | x                                                                         Ca x     Mg x     Ph x        ,             7.1<L>                131<L>| 23   | 26<H>        8.83  >-----------< 155     ------------------------< 96                    23.0<L>                4.4  | 97   | 1.09                                                                      Ca 8.9   Mg x     Ph x                09-07-20 @ 07:01  -  09-08-20 @ 07:00  --------------------------------------------------------  IN: 700 mL / OUT: 1575 mL / NET: -875 mL    09-08-20 @ 07:01  -  09-08-20 @ 17:46  --------------------------------------------------------  IN: 200 mL / OUT: 900 mL / NET: -700 mL    09-08 @ 10:33  PT37.3 INR3.32  PTT--  09-08 @ 03:00  PT30.8 INR2.71  PTT38.5  09-07 @ 17:02  PT56.6 INR5.14  PTT40.4      Daily Height in cm: 177.8 (07 Sep 2020 22:07)    Daily       CAPILLARY BLOOD GLUCOSE                    Coumadin    [x ] YES          [  ]      NO      lvad,  on hold                        PHYSICAL EXAM        Neurology: alert and oriented x 3, nonfocal, no gross deficits  CV : .S1S2 RRR, + hum  Sternal Wound :  CDI , Stable]  Lungs: bibasilar crackles   Drains:    Abdomen: soft, nontender, nondistended, positive bowel sounds driveline cdi  :         voiding    Extremities:     _trace_ edema, __neg _calf tenderness.                             acetaminophen   Tablet .. 650 milliGRAM(s) Oral every 6 hours PRN  aMIOdarone    Tablet 200 milliGRAM(s) Oral daily  cyanocobalamin 1000 MICROGram(s) Oral daily  ferrous    sulfate 325 milliGRAM(s) Oral three times a day  finasteride 5 milliGRAM(s) Oral daily  folic acid 1 milliGRAM(s) Oral daily  influenza   Vaccine 0.5 milliLiter(s) IntraMuscular once  lisinopril 5 milliGRAM(s) Oral daily  lisinopril 2.5 milliGRAM(s) Oral at bedtime  metoprolol succinate ER 25 milliGRAM(s) Oral daily  mirtazapine 7.5 milliGRAM(s) Oral at bedtime  ofloxacin 0.3% Solution 5 Drop(s) Left Ear two times a day  pantoprazole    Tablet 40 milliGRAM(s) Oral every 12 hours  senna 2 Tablet(s) Oral at bedtime  spironolactone 25 milliGRAM(s) Oral daily                    Physical Therapy Rec:   Home  [  ]   Home w/ PT  [  ]  Rehab  [  ]  Discussed with Cardiothoracic Team at AM rounds.

## 2020-09-08 NOTE — CONSULT NOTE ADULT - PROVIDER SPECIALTY LIST ADULT
Problem: PAIN - ADULT  Goal: Verbalizes/displays adequate comfort level or baseline comfort level  Description  Interventions:  - Encourage patient to monitor pain and request assistance  - Assess pain using appropriate pain scale  - Administer analgesics based on type and severity of pain and evaluate response  - Implement non-pharmacological measures as appropriate and evaluate response  - Consider cultural and social influences on pain and pain management  - Notify physician/advanced practitioner if interventions unsuccessful or patient reports new pain  Outcome: Progressing     Problem: INFECTION - ADULT  Goal: Absence or prevention of progression during hospitalization  Description  INTERVENTIONS:  - Assess and monitor for signs and symptoms of infection  - Monitor lab/diagnostic results  - Monitor all insertion sites, i e  indwelling lines, tubes, and drains  - Monitor endotracheal if appropriate and nasal secretions for changes in amount and color  - [place] appropriate cooling/warming therapies per order  - Administer medications as ordered  - Instruct and encourage patient and family to use good hand hygiene technique  - Identify and instruct in appropriate isolation precautions for identified infection/condition  Outcome: Progressing     Problem: SAFETY ADULT  Goal: Maintain or return mobility status to optimal level  Description  INTERVENTIONS:  - Assess patient's baseline mobility status (ambulation, transfers, stairs, etc )    - Identify cognitive and physical deficits and behaviors that affect mobility  - Identify mobility aids required to assist with transfers and/or ambulation (gait belt, sit-to-stand, lift, walker, cane, etc )  - [place] fall precautions as indicated by assessment  - Record patient progress and toleration of activity level on Mobility SBAR; progress patient to next Phase/Stage  - Instruct patient to call for assistance with activity based on assessment  - Consider rehabilitation consult to assist with strengthening/weightbearing, etc   Outcome: Progressing     Problem: DISCHARGE PLANNING  Goal: Discharge to home or other facility with appropriate resources  Description  INTERVENTIONS:  - Identify barriers to discharge w/patient and caregiver  - Arrange for needed discharge resources and transportation as appropriate  - Identify discharge learning needs (meds, wound care, etc )  - Arrange for interpretive services to assist at discharge as needed  - Refer to Case Management Department for coordinating discharge planning if the patient needs post-hospital services based on physician/advanced practitioner order or complex needs related to functional status, cognitive ability, or social support system  Outcome: Progressing     Problem: Knowledge Deficit  Goal: Patient/family/caregiver demonstrates understanding of disease process, treatment plan, medications, and discharge instructions  Description  Complete learning assessment and assess knowledge base    Interventions:  - Provide teaching at level of understanding  - Provide teaching via preferred learning methods  Outcome: Progressing     Problem: Prexisting or High Potential for Compromised Skin Integrity  Goal: Skin integrity is maintained or improved  Description  INTERVENTIONS:  - Identify patients at risk for skin breakdown  - Assess and monitor skin integrity  - Assess and monitor nutrition and hydration status  - Monitor labs   - Assess for incontinence   - Turn and reposition patient  - Assist with mobility/ambulation  - Relieve pressure over bony prominences  - Avoid friction and shearing  - Provide appropriate hygiene as needed including keeping skin clean and dry  - Evaluate need for skin moisturizer/barrier cream  - Collaborate with interdisciplinary team   - Patient/family teaching  - Consider wound care consult   Outcome: Progressing     Problem: Potential for Falls  Goal: Patient will remain free of falls  Description  INTERVENTIONS:  - Assess patient frequently for physical needs  -  Identify cognitive and physical deficits and behaviors that affect risk of falls  -  [place] fall precautions as indicated by assessment   - Educate patient/family on patient safety including physical limitations  - Instruct patient to call for assistance with activity based on assessment  - Modify environment to reduce risk of injury  - Consider OT/PT consult to assist with strengthening/mobility  Outcome: Progressing     Problem: Nutrition/Hydration-ADULT  Goal: Nutrient/Hydration intake appropriate for improving, restoring or maintaining nutritional needs  Description  Monitor and assess patient's nutrition/hydration status for malnutrition  Collaborate with interdisciplinary team and initiate plan and interventions as ordered  Monitor patient's weight and dietary intake as ordered or per policy  Utilize nutrition screening tool and intervene as necessary  Determine patient's food preferences and provide high-protein, high-caloric foods as appropriate       INTERVENTIONS:  - Monitor oral intake, urinary output, labs, and treatment plans  - Assess nutrition and hydration status and recommend course of action  - Evaluate amount of meals eaten  - Assist patient with eating if necessary   - Allow adequate time for meals  - Recommend/ encourage appropriate diets, oral nutritional supplements, and vitamin/mineral supplements  - Order, calculate, and assess calorie counts as needed  - Recommend, monitor, and adjust tube feedings and TPN/PPN based on assessed needs  - Assess need for intravenous fluids  - Provide specific nutrition/hydration education as appropriate  - Include patient/family/caregiver in decisions related to nutrition  Outcome: Progressing     Problem: NEUROSENSORY - ADULT  Goal: Achieves stable or improved neurological status  Description  INTERVENTIONS  - Monitor and report changes in neurological status  - Monitor vital signs such as temperature, blood pressure, glucose, and any other labs ordered   - Initiate measures to prevent increased intracranial pressure  - Monitor for seizure activity and implement precautions if appropriate      Outcome: Progressing  Goal: Remains free of injury related to seizures activity  Description  INTERVENTIONS  - Maintain airway, patient safety  and administer oxygen as ordered  - Monitor patient for seizure activity, document and report duration and description of seizure to physician/advanced practitioner  - If seizure occurs,  ensure patient safety during seizure  - Reorient patient post seizure  - Seizure pads on all 4 side rails  - Instruct patient/family to notify RN of any seizure activity including if an aura is experienced  - Instruct patient/family to call for assistance with activity based on nursing assessment  - Administer anti-seizure medications if ordered    Outcome: Progressing     Problem: RESPIRATORY - ADULT  Goal: Achieves optimal ventilation and oxygenation  Description  INTERVENTIONS:  - Assess for changes in respiratory status  - Assess for changes in mentation and behavior  - Position to facilitate oxygenation and minimize respiratory effort  - Oxygen administered by appropriate delivery if ordered  - Initiate smoking cessation education as indicated  - Encourage broncho-pulmonary hygiene including cough, deep breathe, Incentive Spirometry  - Assess the need for suctioning and aspirate as needed  - Assess and instruct to report SOB or any respiratory difficulty  - Respiratory Therapy support as indicated  Outcome: Progressing     Problem: GASTROINTESTINAL - ADULT  Goal: Maintains adequate nutritional intake  Description  INTERVENTIONS:  - Monitor percentage of each meal consumed  - Identify factors contributing to decreased intake, treat as appropriate  - Assist with meals as needed  - Monitor I&O, weight, and lab values if indicated  - Obtain nutrition services referral as needed  Outcome: Progressing     Problem: SKIN/TISSUE INTEGRITY - ADULT  Goal: Skin integrity remains intact  Description  INTERVENTIONS  - Identify patients at risk for skin breakdown  - Assess and monitor skin integrity  - Assess and monitor nutrition and hydration status  - Monitor labs (i e  albumin)  - Assess for incontinence   - Turn and reposition patient  - Assist with mobility/ambulation  - Relieve pressure over bony prominences  - Avoid friction and shearing  - Provide appropriate hygiene as needed including keeping skin clean and dry  - Evaluate need for skin moisturizer/barrier cream  - Collaborate with interdisciplinary team (i e  Nutrition, Rehabilitation, etc )   - Patient/family teaching  Outcome: Progressing Heart Failure

## 2020-09-09 LAB
ALBUMIN SERPL ELPH-MCNC: 3.8 G/DL — SIGNIFICANT CHANGE UP (ref 3.3–5)
ALBUMIN SERPL ELPH-MCNC: 4.1 G/DL — SIGNIFICANT CHANGE UP (ref 3.3–5)
ALP SERPL-CCNC: 54 U/L — SIGNIFICANT CHANGE UP (ref 40–120)
ALP SERPL-CCNC: 56 U/L — SIGNIFICANT CHANGE UP (ref 40–120)
ALT FLD-CCNC: 11 U/L — SIGNIFICANT CHANGE UP (ref 10–45)
ALT FLD-CCNC: 11 U/L — SIGNIFICANT CHANGE UP (ref 10–45)
ANION GAP SERPL CALC-SCNC: 12 MMOL/L — SIGNIFICANT CHANGE UP (ref 5–17)
ANION GAP SERPL CALC-SCNC: 12 MMOL/L — SIGNIFICANT CHANGE UP (ref 5–17)
APTT BLD: 34.2 SEC — SIGNIFICANT CHANGE UP (ref 27.5–35.5)
AST SERPL-CCNC: 19 U/L — SIGNIFICANT CHANGE UP (ref 10–40)
AST SERPL-CCNC: 21 U/L — SIGNIFICANT CHANGE UP (ref 10–40)
BILIRUB SERPL-MCNC: 0.3 MG/DL — SIGNIFICANT CHANGE UP (ref 0.2–1.2)
BILIRUB SERPL-MCNC: 0.4 MG/DL — SIGNIFICANT CHANGE UP (ref 0.2–1.2)
BUN SERPL-MCNC: 27 MG/DL — HIGH (ref 7–23)
BUN SERPL-MCNC: 28 MG/DL — HIGH (ref 7–23)
CALCIUM SERPL-MCNC: 9 MG/DL — SIGNIFICANT CHANGE UP (ref 8.4–10.5)
CALCIUM SERPL-MCNC: 9 MG/DL — SIGNIFICANT CHANGE UP (ref 8.4–10.5)
CHLORIDE SERPL-SCNC: 96 MMOL/L — SIGNIFICANT CHANGE UP (ref 96–108)
CHLORIDE SERPL-SCNC: 97 MMOL/L — SIGNIFICANT CHANGE UP (ref 96–108)
CO2 SERPL-SCNC: 22 MMOL/L — SIGNIFICANT CHANGE UP (ref 22–31)
CO2 SERPL-SCNC: 24 MMOL/L — SIGNIFICANT CHANGE UP (ref 22–31)
CREAT SERPL-MCNC: 1.11 MG/DL — SIGNIFICANT CHANGE UP (ref 0.5–1.3)
CREAT SERPL-MCNC: 1.31 MG/DL — HIGH (ref 0.5–1.3)
GLUCOSE SERPL-MCNC: 102 MG/DL — HIGH (ref 70–99)
GLUCOSE SERPL-MCNC: 96 MG/DL — SIGNIFICANT CHANGE UP (ref 70–99)
HCT VFR BLD CALC: 26.9 % — LOW (ref 39–50)
HCT VFR BLD CALC: 27 % — LOW (ref 39–50)
HGB BLD-MCNC: 8.2 G/DL — LOW (ref 13–17)
HGB BLD-MCNC: 8.5 G/DL — LOW (ref 13–17)
INR BLD: 2.16 RATIO — HIGH (ref 0.88–1.16)
INR BLD: 2.25 RATIO — HIGH (ref 0.88–1.16)
LDH SERPL L TO P-CCNC: 212 U/L — SIGNIFICANT CHANGE UP (ref 50–242)
MAGNESIUM SERPL-MCNC: 2.3 MG/DL — SIGNIFICANT CHANGE UP (ref 1.6–2.6)
MCHC RBC-ENTMCNC: 29.5 PG — SIGNIFICANT CHANGE UP (ref 27–34)
MCHC RBC-ENTMCNC: 30.2 PG — SIGNIFICANT CHANGE UP (ref 27–34)
MCHC RBC-ENTMCNC: 30.5 GM/DL — LOW (ref 32–36)
MCHC RBC-ENTMCNC: 31.5 GM/DL — LOW (ref 32–36)
MCV RBC AUTO: 96.1 FL — SIGNIFICANT CHANGE UP (ref 80–100)
MCV RBC AUTO: 96.8 FL — SIGNIFICANT CHANGE UP (ref 80–100)
NRBC # BLD: 0 /100 WBCS — SIGNIFICANT CHANGE UP (ref 0–0)
NRBC # BLD: 0 /100 WBCS — SIGNIFICANT CHANGE UP (ref 0–0)
PLATELET # BLD AUTO: 167 K/UL — SIGNIFICANT CHANGE UP (ref 150–400)
PLATELET # BLD AUTO: 171 K/UL — SIGNIFICANT CHANGE UP (ref 150–400)
POTASSIUM SERPL-MCNC: 3.9 MMOL/L — SIGNIFICANT CHANGE UP (ref 3.5–5.3)
POTASSIUM SERPL-MCNC: 4.4 MMOL/L — SIGNIFICANT CHANGE UP (ref 3.5–5.3)
POTASSIUM SERPL-SCNC: 3.9 MMOL/L — SIGNIFICANT CHANGE UP (ref 3.5–5.3)
POTASSIUM SERPL-SCNC: 4.4 MMOL/L — SIGNIFICANT CHANGE UP (ref 3.5–5.3)
PROT SERPL-MCNC: 6.8 G/DL — SIGNIFICANT CHANGE UP (ref 6–8.3)
PROT SERPL-MCNC: 6.8 G/DL — SIGNIFICANT CHANGE UP (ref 6–8.3)
PROTHROM AB SERPL-ACNC: 24.8 SEC — HIGH (ref 10.6–13.6)
PROTHROM AB SERPL-ACNC: 25.8 SEC — HIGH (ref 10.6–13.6)
RBC # BLD: 2.78 M/UL — LOW (ref 4.2–5.8)
RBC # BLD: 2.81 M/UL — LOW (ref 4.2–5.8)
RBC # FLD: 21.7 % — HIGH (ref 10.3–14.5)
RBC # FLD: 21.9 % — HIGH (ref 10.3–14.5)
SODIUM SERPL-SCNC: 131 MMOL/L — LOW (ref 135–145)
SODIUM SERPL-SCNC: 132 MMOL/L — LOW (ref 135–145)
WBC # BLD: 10.24 K/UL — SIGNIFICANT CHANGE UP (ref 3.8–10.5)
WBC # BLD: 7.67 K/UL — SIGNIFICANT CHANGE UP (ref 3.8–10.5)
WBC # FLD AUTO: 10.24 K/UL — SIGNIFICANT CHANGE UP (ref 3.8–10.5)
WBC # FLD AUTO: 7.67 K/UL — SIGNIFICANT CHANGE UP (ref 3.8–10.5)

## 2020-09-09 PROCEDURE — 99232 SBSQ HOSP IP/OBS MODERATE 35: CPT

## 2020-09-09 PROCEDURE — 99223 1ST HOSP IP/OBS HIGH 75: CPT | Mod: GC

## 2020-09-09 PROCEDURE — 93750 INTERROGATION VAD IN PERSON: CPT

## 2020-09-09 PROCEDURE — 99233 SBSQ HOSP IP/OBS HIGH 50: CPT | Mod: 25

## 2020-09-09 RX ORDER — POTASSIUM CHLORIDE 20 MEQ
30 PACKET (EA) ORAL ONCE
Refills: 0 | Status: COMPLETED | OUTPATIENT
Start: 2020-09-09 | End: 2020-09-09

## 2020-09-09 RX ORDER — OXYCODONE HYDROCHLORIDE 5 MG/1
2.5 TABLET ORAL
Refills: 0 | Status: DISCONTINUED | OUTPATIENT
Start: 2020-09-09 | End: 2020-09-11

## 2020-09-09 RX ORDER — SODIUM CHLORIDE 9 MG/ML
250 INJECTION INTRAMUSCULAR; INTRAVENOUS; SUBCUTANEOUS ONCE
Refills: 0 | Status: COMPLETED | OUTPATIENT
Start: 2020-09-09 | End: 2020-09-09

## 2020-09-09 RX ORDER — LISINOPRIL 2.5 MG/1
2.5 TABLET ORAL
Refills: 0 | Status: DISCONTINUED | OUTPATIENT
Start: 2020-09-09 | End: 2020-09-09

## 2020-09-09 RX ORDER — POTASSIUM CHLORIDE 20 MEQ
10 PACKET (EA) ORAL ONCE
Refills: 0 | Status: COMPLETED | OUTPATIENT
Start: 2020-09-09 | End: 2020-09-09

## 2020-09-09 RX ADMIN — Medication 25 MILLIGRAM(S): at 05:44

## 2020-09-09 RX ADMIN — Medication 325 MILLIGRAM(S): at 13:39

## 2020-09-09 RX ADMIN — Medication 5 DROP(S): at 05:45

## 2020-09-09 RX ADMIN — Medication 10 MILLIEQUIVALENT(S): at 09:25

## 2020-09-09 RX ADMIN — MIRTAZAPINE 7.5 MILLIGRAM(S): 45 TABLET, ORALLY DISINTEGRATING ORAL at 21:22

## 2020-09-09 RX ADMIN — SENNA PLUS 2 TABLET(S): 8.6 TABLET ORAL at 21:22

## 2020-09-09 RX ADMIN — Medication 5 DROP(S): at 17:24

## 2020-09-09 RX ADMIN — PANTOPRAZOLE SODIUM 40 MILLIGRAM(S): 20 TABLET, DELAYED RELEASE ORAL at 17:24

## 2020-09-09 RX ADMIN — PANTOPRAZOLE SODIUM 40 MILLIGRAM(S): 20 TABLET, DELAYED RELEASE ORAL at 05:44

## 2020-09-09 RX ADMIN — Medication 325 MILLIGRAM(S): at 21:22

## 2020-09-09 RX ADMIN — Medication 325 MILLIGRAM(S): at 05:44

## 2020-09-09 RX ADMIN — AMIODARONE HYDROCHLORIDE 200 MILLIGRAM(S): 400 TABLET ORAL at 05:44

## 2020-09-09 RX ADMIN — Medication 1 MILLIGRAM(S): at 11:42

## 2020-09-09 RX ADMIN — SODIUM CHLORIDE 250 MILLILITER(S): 9 INJECTION INTRAMUSCULAR; INTRAVENOUS; SUBCUTANEOUS at 15:09

## 2020-09-09 RX ADMIN — SODIUM CHLORIDE 500 MILLILITER(S): 9 INJECTION INTRAMUSCULAR; INTRAVENOUS; SUBCUTANEOUS at 14:03

## 2020-09-09 RX ADMIN — Medication 30 MILLIEQUIVALENT(S): at 11:42

## 2020-09-09 RX ADMIN — SPIRONOLACTONE 25 MILLIGRAM(S): 25 TABLET, FILM COATED ORAL at 05:44

## 2020-09-09 RX ADMIN — PREGABALIN 1000 MICROGRAM(S): 225 CAPSULE ORAL at 11:42

## 2020-09-09 RX ADMIN — LISINOPRIL 5 MILLIGRAM(S): 2.5 TABLET ORAL at 05:44

## 2020-09-09 RX ADMIN — FINASTERIDE 5 MILLIGRAM(S): 5 TABLET, FILM COATED ORAL at 11:42

## 2020-09-09 NOTE — PROGRESS NOTE ADULT - ASSESSMENT
66 YO M s/p fall, L EAC noted to have open fracture (avulsed skin with slightly exposed bone). L EAC suctioned and packed with xeroform gauze yesterday (9/9). No active bleeding at this time.

## 2020-09-09 NOTE — CONSULT NOTE ADULT - ASSESSMENT
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 Reported episodes of GI Bleeding butt currently no overt bleeding since admission after correction of INR.    # Normocytic anemia: c/f reported rectal bleeding. None in hospital. Had recent complete GI anemia workup which was unrevealing. Currently hgb stable after correcting INR and giving prbc. Hemodynamically stable. Pt with hematoma which may contribute to anemia as well. Possible DDx for acute on chronic anemia includes hemorrhoidal bleed, AVM, Dieulafoy bleeding polyp.  # LVAD on AC: initially with INR >5 and acute on chronic anemia. Resolved now and INR now in therapeutic range.  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 Reported episodes of GI Bleeding butt currently no overt bleeding since admission after correction of INR.    # Normocytic anemia: c/f reported rectal bleeding. None in hospital. Had recent complete GI anemia workup which was unrevealing. Currently hgb stable after correcting INR and giving prbc. Hemodynamically stable. Pt with hematoma which may contribute to anemia as well as bleeding from ear. Possible DDx for acute on chronic anemia includes hemorrhoidal bleed, AVM, [place] bleeding polyp.  # LVAD on AC: initially with INR >5 and acute on chronic anemia. Resolved now and INR now in therapeutic range.  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 Reported episodes of GI Bleeding butt currently no overt bleeding since admission after correction of INR.    # Normocytic anemia: c/f reported rectal bleeding. None in hospital. Had recent complete GI anemia workup which was unrevealing. Currently hgb stable after correcting INR and giving prbc. Hemodynamically stable. Pt with hematoma which may contribute to anemia as well as bleeding from ear. Possible DDx for acute on chronic anemia includes hemorrhoidal bleed, AVM, Die[place] bleeding polyp.  # LVAD on AC: initially with INR >5 and acute on chronic anemia. Resolved now and INR now in therapeutic range.  # Premaxillary hematoma  # PAD    Recommendations:  - continue with PPI 40mg po BID  - trend CBC, CMP, INR  - 2 large bore IVs; active type and screen  - transfuse Hgb per heart failure team, Platelets > 50  - monitor CBC, if rectal bleeding and declining hgb, can consider repeat EGD and colonoscopy; at this time risks of anesthesia and procedure may outweigh benefits given recent normal endoscopic evaluation  - check iron studies, replete with IV iron as needed  - supportive care as per primary team Impression:  65 yo male with PMH of AHA stage D HF 2/2 NICM with HM2 LVAD (on coumadin) as destination therapy due to severe PAD, SIADH who presents after a fall found to have a supratherapeutic INR and anemia. Reported episodes of GI Bleeding butt currently no overt bleeding since admission after correction of INR.    # Normocytic anemia: c/f reported rectal bleeding. None in hospital. Had recent complete GI anemia workup which was unrevealing. Currently hgb stable after correcting INR and giving prbc. Hemodynamically stable. Pt with hematoma which may contribute to anemia as well as bleeding from ear. Possible DDx for acute on chronic anemia includes hemorrhoidal bleed, AVM, [place] bleeding polyp.  # LVAD on AC: initially with INR >5 and acute on chronic anemia. Resolved now and INR now in therapeutic range.  # Premaxillary hematoma  # PAD    Recommendations:  - continue with PPI 40mg po BID  - trend CBC, CMP, INR  - 2 large bore IVs; active type and screen  - transfuse Hgb per heart failure team, Platelets > 50  - monitor CBC, if rectal bleeding and declining hgb, can consider repeat EGD and colonoscopy; at this time risks of anesthesia and procedure may outweigh benefits given recent normal endoscopic evaluation and no active bleeding now  - check iron studies, replete with IV iron as needed  - supportive care as per primary team    GI e-mail: [email or web address]  GI Iphone: [phone]

## 2020-09-09 NOTE — PROGRESS NOTE ADULT - PROBLEM SELECTOR PLAN 1
- s/p fall and questionable syncopal episode on 9/7  - CT of IAC shows acute posteriorly displaced fracture of the anterior wall of the left external auditory canal with partial opacification of the external auditory canal.  - ENT consulted, appreciate recommendations   - Left ear cauterized and packed with surgicel x 1, packing to be removed by ENT 9/10.  - Will further control INR and BP to reduce bleeding. - s/p fall and questionable syncopal episode on 9/7  - CT of IAC shows acute posteriorly displaced fracture of the anterior wall of the left external auditory canal with partial opacification of the external auditory canal.  - ENT consulted, appreciate recommendations   - Left ear cauterized and packed with surgicel x 1, packing to be removed by ENT 9/10.

## 2020-09-09 NOTE — CONSULT NOTE ADULT - SUBJECTIVE AND OBJECTIVE BOX
Chief Complaint:  Patient is a 65y old  Male who presents with a chief complaint of Fall (09 Sep 2020 08:23)      HPI: Pt is a 64yo M PMH HFrEF secondary to NICM s/p HM2 LVAD (2017) on coumadin, TV annuloplasty ring, SIADH, CKD3, presenting after fall. Pt is a poor hisotrian however states he was home and felt weak and fell. He does not recall details of events. He denies chest pain, shortness of breath. No reported hematochezia or melena however reportedly states he had bleeding to his primary team. He denies losing consciousness, however is not sure. Patient complains of face pain prior.    Allergies:  No Known Allergies      Home Medications:    Hospital Medications:  acetaminophen   Tablet .. 650 milliGRAM(s) Oral every 6 hours PRN  aMIOdarone    Tablet 200 milliGRAM(s) Oral daily  cyanocobalamin 1000 MICROGram(s) Oral daily  ferrous    sulfate 325 milliGRAM(s) Oral three times a day  finasteride 5 milliGRAM(s) Oral daily  folic acid 1 milliGRAM(s) Oral daily  influenza   Vaccine 0.5 milliLiter(s) IntraMuscular once  lisinopril 5 milliGRAM(s) Oral daily  lisinopril 2.5 milliGRAM(s) Oral at bedtime  metoprolol succinate ER 25 milliGRAM(s) Oral daily  mirtazapine 7.5 milliGRAM(s) Oral at bedtime  ofloxacin 0.3% Solution 5 Drop(s) Left Ear two times a day  oxyCODONE    IR 2.5 milliGRAM(s) Oral four times a day PRN  pantoprazole    Tablet 40 milliGRAM(s) Oral every 12 hours  senna 2 Tablet(s) Oral at bedtime  spironolactone 25 milliGRAM(s) Oral daily      PMHX/PSHX:  GI bleed  Vertigo  H/O epistaxis  Iron deficiency anemia  CKD (chronic kidney disease), stage III  Clavicle fracture  Falls  Anticoagulation goal of INR 2.0 to 2.5  ACC/AHA stage D systolic heart failure  BPH with urinary obstruction  Claudication  Peripheral arterial disease  Bleeding hemorrhoids  Hemorrhoids  History of 2019 novel coronavirus disease (COVID-19)  Pleural effusion  Depression  CAD (coronary artery disease)  CHF (congestive heart failure)  No pertinent past medical history  S/P endoscopy  S/P ventricular assist device  S/P TVR (tricuspid valve repair)  No significant past surgical history      Family history:  No pertinent family history in first degree relatives      Social History:     ROS: As per HPI, 14-point ROS negative otherwise.    General:  No wt loss, fevers, chills, night sweats, fatigue,   Eyes:  Good vision, no reported pain  ENT:  No sore throat, pain, runny nose, dysphagia  CV:  No pain, palpitations, hypo/hypertension  Resp:  No dyspnea, cough, tachypnea, wheezing  GI:  See HPI  :  No pain, bleeding, incontinence, nocturia  Muscle:  No pain, weakness  Neuro:  No weakness, tingling, memory problems  Psych:  No fatigue, insomnia, mood problems, depression  Endocrine:  No polyuria, polydipsia, cold/heat intolerance  Heme:  No petechiae, ecchymosis, easy bruisability  Skin:  No rash, edema      PHYSICAL EXAM:     Vital Signs:  Vital Signs Last 24 Hrs  T(C): 37.2 (09 Sep 2020 05:42), Max: 37.8 (08 Sep 2020 19:08)  T(F): 99 (09 Sep 2020 05:42), Max: 100 (08 Sep 2020 19:08)  HR: 95 (09 Sep 2020 05:42) (81 - 95)  BP: 84/63 (08 Sep 2020 14:09) (84/63 - 86/61)  BP(mean): 78 (09 Sep 2020 05:42) (66 - 78)  RR: 18 (09 Sep 2020 05:42) (16 - 18)  SpO2: 96% (09 Sep 2020 05:42) (95% - 98%)  Daily     Daily     GENERAL:  Appears stated age, well-groomed, well-nourished, no distress  HEENT:  NC/AT,  conjunctivae clear and pink  CHEST:  Full & symmetric excursion, no increased effort  HEART:  Regular rhythm, no JVD  ABDOMEN:  Soft, non-tender, non-distended, normoactive bowel sounds,  no masses , no hepatosplenomegaly  EXTREMITIES:  no cyanosis, clubbing or edema  SKIN:  No rash/erythema/ecchymoses/petechiae/wounds/abscess/warm/dry  NEURO:  Alert, oriented, nonfocal    LABS:                        8.5    10.24 )-----------( 167      ( 09 Sep 2020 05:16 )             27.0     09-09    132<L>  |  96  |  27<H>  ----------------------------<  96  3.9   |  24  |  1.11    Ca    9.0      09 Sep 2020 05:16  Phos  3.5     09-08  Mg     2.2     09-08    TPro  6.8  /  Alb  4.1  /  TBili  0.4  /  DBili  x   /  AST  21  /  ALT  11  /  AlkPhos  54  09-09    LIVER FUNCTIONS - ( 09 Sep 2020 05:16 )  Alb: 4.1 g/dL / Pro: 6.8 g/dL / ALK PHOS: 54 U/L / ALT: 11 U/L / AST: 21 U/L / GGT: x           PT/INR - ( 09 Sep 2020 05:16 )   PT: 25.8 sec;   INR: 2.25 ratio         PTT - ( 09 Sep 2020 05:16 )  PTT:34.2 sec        Imaging:    < from: Upper Endoscopy (07.13.20 @ 09:09) >  Findings:       The examined esophagus was normal.       The Z-line was regular and was found 38 cm from the incisors.       A 2 cm hiatus hernia was present.       The entire examined stomach was normal.       The examined duodenumwas normal.                                                                                                        Impression:          - Normal esophagus.                       - Z-line regular, 38 cm from the incisors.                       - 2 cm hiatus hernia.                       - Normal stomach.                       - Normal examined duodenum.                       - No specimens collected.    < end of copied text >  < from: Colonoscopy (07.13.20 @ 09:09) >  Findings:       A large amount of dark green liquid and solid stool was found in the entire colon interfering        with visualization.       The colon was otherwise without abnormality.       Liquid brown stool was found in the terminal ileum.       Non-bleeding internal hemorrhoids were found during retroflexion. The hemorrhoids were small.                                                                                                        Impression:          - Stool in the entire examined colon and terminal ileum.                       - No active bleeding or source of bleeding identified in the colon.                       - Non-bleeding internal hemorrhoids.                       - No specimens collected.    < end of copied text >  < from: Capsule Endoscopy (07.15.20 @ 14:17) >  Findings:       Images of the esophagus, stomach and small bowel were obtained from the swallowed capsule and        reviewed.       No significant pathology seen in the examined small bowel. No signs of bleeding seen in the        small intestine.                                                     Impression:          - Normal video capsule endoscopy.    < end of copied text >  < from: CT Abdomen and Pelvis w/ IV Cont (09.08.20 @ 03:42) >  IMPRESSION:  No acute intra-abdominal pathology.    Interval new cluster of left lower lobe nodular opacities, which may be secondary to mucoid impacted distal airways. Trace debris in the trachea and right mainstem bronchus.    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    < end of copied text >      < from: CT Head No Cont (09.08.20 @ 17:53) >  Impression:    No acute intracranial hemorrhage.  New right premaxillary soft tissue hematoma measuring 1.3 x 1.5 x 2.6 cm.    < end of copied text > Chief Complaint:  Patient is a 65y old  Male who presents with a chief complaint of Fall (09 Sep 2020 08:23)      HPI: Pt is a 64yo M PMH HFrEF secondary to NICM s/p HM2 LVAD (2017) on coumadin, TV annuloplasty ring, SIADH, CKD3, presenting after fall. Pt is a poor hisotrian however states he was home and felt weak and fell. He does not recall details of events. He denies chest pain, shortness of breath. No reported hematochezia or melena however reportedly states he had bleeding to his primary team. He denies losing consciousness, however is not sure. Patient complains of face pain prior.    Allergies:  No Known Allergies      Home Medications:    Hospital Medications:  acetaminophen   Tablet .. 650 milliGRAM(s) Oral every 6 hours PRN  aMIOdarone    Tablet 200 milliGRAM(s) Oral daily  cyanocobalamin 1000 MICROGram(s) Oral daily  ferrous    sulfate 325 milliGRAM(s) Oral three times a day  finasteride 5 milliGRAM(s) Oral daily  folic acid 1 milliGRAM(s) Oral daily  influenza   Vaccine 0.5 milliLiter(s) IntraMuscular once  lisinopril 5 milliGRAM(s) Oral daily  lisinopril 2.5 milliGRAM(s) Oral at bedtime  metoprolol succinate ER 25 milliGRAM(s) Oral daily  mirtazapine 7.5 milliGRAM(s) Oral at bedtime  ofloxacin 0.3% Solution 5 Drop(s) Left Ear two times a day  oxyCODONE    IR 2.5 milliGRAM(s) Oral four times a day PRN  pantoprazole    Tablet 40 milliGRAM(s) Oral every 12 hours  senna 2 Tablet(s) Oral at bedtime  spironolactone 25 milliGRAM(s) Oral daily      PMHX/PSHX:  GI bleed  Vertigo  H/O epistaxis  Iron deficiency anemia  CKD (chronic kidney disease), stage III  Clavicle fracture  Falls  Anticoagulation goal of INR 2.0 to 2.5  ACC/AHA stage D systolic heart failure  BPH with urinary obstruction  Claudication  Peripheral arterial disease  Bleeding hemorrhoids  Hemorrhoids  History of 2019 novel coronavirus disease (COVID-19)  Pleural effusion  Depression  CAD (coronary artery disease)  CHF (congestive heart failure)  No pertinent past medical history  S/P endoscopy  S/P ventricular assist device  S/P TVR (tricuspid valve repair)  No significant past surgical history      Family history:  No pertinent family history in first degree relatives      Social History:     ROS: As per HPI, 14-point ROS negative otherwise.    General:  No wt loss, fevers, chills, night sweats, fatigue,   Eyes:  Good vision, no reported pain  ENT:  No sore throat, pain, runny nose, dysphagia  CV:  No pain, palpitations, hypo/hypertension  Resp:  No dyspnea, cough, tachypnea, wheezing  GI:  See HPI  :  No pain, bleeding, incontinence, nocturia  Muscle:  No pain, weakness  Neuro:  No weakness, tingling, memory problems  Psych:  No fatigue, insomnia, mood problems, depression  Endocrine:  No polyuria, polydipsia, cold/heat intolerance  Heme:  No petechiae, ecchymosis, easy bruisability  Skin:  No rash, edema      PHYSICAL EXAM:     Vital Signs:  Vital Signs Last 24 Hrs  T(C): 37.2 (09 Sep 2020 05:42), Max: 37.8 (08 Sep 2020 19:08)  T(F): 99 (09 Sep 2020 05:42), Max: 100 (08 Sep 2020 19:08)  HR: 95 (09 Sep 2020 05:42) (81 - 95)  BP: 84/63 (08 Sep 2020 14:09) (84/63 - 86/61)  BP(mean): 78 (09 Sep 2020 05:42) (66 - 78)  RR: 18 (09 Sep 2020 05:42) (16 - 18)  SpO2: 96% (09 Sep 2020 05:42) (95% - 98%)  Daily     Daily     GENERAL:  No acute distress  HEENT: packing in ear  CHEST: Normal effort, no accessory muscle use  HEART: +LVAD hum  ABDOMEN:  Soft, non-tender, non-distended, normoactive bowel sounds, drive-line site c/d/i  EXTREMITIES:  No cyanosis, clubbing, or edema  SKIN:  No rash/erythema  NEURO:  nonfocal    LABS:                        8.5    10.24 )-----------( 167      ( 09 Sep 2020 05:16 )             27.0     09-09    132<L>  |  96  |  27<H>  ----------------------------<  96  3.9   |  24  |  1.11    Ca    9.0      09 Sep 2020 05:16  Phos  3.5     09-08  Mg     2.2     09-08    TPro  6.8  /  Alb  4.1  /  TBili  0.4  /  DBili  x   /  AST  21  /  ALT  11  /  AlkPhos  54  09-09    LIVER FUNCTIONS - ( 09 Sep 2020 05:16 )  Alb: 4.1 g/dL / Pro: 6.8 g/dL / ALK PHOS: 54 U/L / ALT: 11 U/L / AST: 21 U/L / GGT: x           PT/INR - ( 09 Sep 2020 05:16 )   PT: 25.8 sec;   INR: 2.25 ratio         PTT - ( 09 Sep 2020 05:16 )  PTT:34.2 sec        Imaging:    < from: Upper Endoscopy (07.13.20 @ 09:09) >  Findings:       The examined esophagus was normal.       The Z-line was regular and was found 38 cm from the incisors.       A 2 cm hiatus hernia was present.       The entire examined stomach was normal.       The examined duodenumwas normal.                                                                                                        Impression:          - Normal esophagus.                       - Z-line regular, 38 cm from the incisors.                       - 2 cm hiatus hernia.                       - Normal stomach.                       - Normal examined duodenum.                       - No specimens collected.    < end of copied text >  < from: Colonoscopy (07.13.20 @ 09:09) >  Findings:       A large amount of dark green liquid and solid stool was found in the entire colon interfering        with visualization.       The colon was otherwise without abnormality.       Liquid brown stool was found in the terminal ileum.       Non-bleeding internal hemorrhoids were found during retroflexion. The hemorrhoids were small.                                                                                                        Impression:          - Stool in the entire examined colon and terminal ileum.                       - No active bleeding or source of bleeding identified in the colon.                       - Non-bleeding internal hemorrhoids.                       - No specimens collected.    < end of copied text >  < from: Capsule Endoscopy (07.15.20 @ 14:17) >  Findings:       Images of the esophagus, stomach and small bowel were obtained from the swallowed capsule and        reviewed.       No significant pathology seen in the examined small bowel. No signs of bleeding seen in the        small intestine.                                                     Impression:          - Normal video capsule endoscopy.    < end of copied text >  < from: CT Abdomen and Pelvis w/ IV Cont (09.08.20 @ 03:42) >  IMPRESSION:  No acute intra-abdominal pathology.    Interval new cluster of left lower lobe nodular opacities, which may be secondary to mucoid impacted distal airways. Trace debris in the trachea and right mainstem bronchus.    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    < end of copied text >      < from: CT Head No Cont (09.08.20 @ 17:53) >  Impression:    No acute intracranial hemorrhage.  New right premaxillary soft tissue hematoma measuring 1.3 x 1.5 x 2.6 cm.    < end of copied text > Chief Complaint:  Patient is a 65y old  Male who presents with a chief complaint of Fall (09 Sep 2020 08:23)      HPI: Pt is a 64yo M PMH HFrEF secondary to NICM s/p HM2 LVAD (2017) on coumadin, TV annuloplasty ring, SIADH, CKD3, presenting after fall. Pt is a poor hisotrian however states he was home and felt weak and fell. He does not recall details of events. He denies chest pain, shortness of breath. No reported hematochezia or melena however reportedly states he had bleeding at home to his primary team. He denies losing consciousness, however is not sure. Patient complains of face pain prior. He had bleeding in his ear and is now s/p packing by ENT. He was recently admitted for iron deficiency anemia, and had negative pan-endoscopy.    Allergies:  No Known Allergies      Home Medications:    Hospital Medications:  acetaminophen   Tablet .. 650 milliGRAM(s) Oral every 6 hours PRN  aMIOdarone    Tablet 200 milliGRAM(s) Oral daily  cyanocobalamin 1000 MICROGram(s) Oral daily  ferrous    sulfate 325 milliGRAM(s) Oral three times a day  finasteride 5 milliGRAM(s) Oral daily  folic acid 1 milliGRAM(s) Oral daily  influenza   Vaccine 0.5 milliLiter(s) IntraMuscular once  lisinopril 5 milliGRAM(s) Oral daily  lisinopril 2.5 milliGRAM(s) Oral at bedtime  metoprolol succinate ER 25 milliGRAM(s) Oral daily  mirtazapine 7.5 milliGRAM(s) Oral at bedtime  ofloxacin 0.3% Solution 5 Drop(s) Left Ear two times a day  oxyCODONE    IR 2.5 milliGRAM(s) Oral four times a day PRN  pantoprazole    Tablet 40 milliGRAM(s) Oral every 12 hours  senna 2 Tablet(s) Oral at bedtime  spironolactone 25 milliGRAM(s) Oral daily      PMHX/PSHX:  GI bleed  Vertigo  H/O epistaxis  Iron deficiency anemia  CKD (chronic kidney disease), stage III  Clavicle fracture  Falls  Anticoagulation goal of INR 2.0 to 2.5  ACC/AHA stage D systolic heart failure  BPH with urinary obstruction  Claudication  Peripheral arterial disease  Bleeding hemorrhoids  Hemorrhoids  History of 2019 novel coronavirus disease (COVID-19)  Pleural effusion  Depression  CAD (coronary artery disease)  CHF (congestive heart failure)  No pertinent past medical history  S/P endoscopy  S/P ventricular assist device  S/P TVR (tricuspid valve repair)  No significant past surgical history      Family history:  No pertinent family history in first degree relatives      Social History:     ROS: As per HPI, 14-point ROS negative otherwise.    General:  No wt loss, fevers, chills, night sweats, fatigue,   Eyes:  Good vision, no reported pain  ENT:  No sore throat, pain, runny nose, dysphagia  CV:  No pain, palpitations, hypo/hypertension  Resp:  No dyspnea, cough, tachypnea, wheezing  GI:  See HPI  :  No pain, bleeding, incontinence, nocturia  Muscle:  No pain, weakness  Neuro:  No weakness, tingling, memory problems  Psych:  No fatigue, insomnia, mood problems, depression  Endocrine:  No polyuria, polydipsia, cold/heat intolerance  Heme:  No petechiae, ecchymosis, easy bruisability  Skin:  No rash, edema      PHYSICAL EXAM:     Vital Signs:  Vital Signs Last 24 Hrs  T(C): 37.2 (09 Sep 2020 05:42), Max: 37.8 (08 Sep 2020 19:08)  T(F): 99 (09 Sep 2020 05:42), Max: 100 (08 Sep 2020 19:08)  HR: 95 (09 Sep 2020 05:42) (81 - 95)  BP: 84/63 (08 Sep 2020 14:09) (84/63 - 86/61)  BP(mean): 78 (09 Sep 2020 05:42) (66 - 78)  RR: 18 (09 Sep 2020 05:42) (16 - 18)  SpO2: 96% (09 Sep 2020 05:42) (95% - 98%)  Daily     Daily     GENERAL:  No acute distress  HEENT: packing in ear  CHEST: Normal effort, no accessory muscle use  HEART: +LVAD hum  ABDOMEN:  Soft, non-tender, non-distended, normoactive bowel sounds, drive-line site c/d/i  RECTAL: No blood  EXTREMITIES:  No cyanosis, clubbing, or edema  SKIN:  No rash/erythema  NEURO:  nonfocal    LABS:                        8.5    10.24 )-----------( 167      ( 09 Sep 2020 05:16 )             27.0     09-09    132<L>  |  96  |  27<H>  ----------------------------<  96  3.9   |  24  |  1.11    Ca    9.0      09 Sep 2020 05:16  Phos  3.5     09-08  Mg     2.2     09-08    TPro  6.8  /  Alb  4.1  /  TBili  0.4  /  DBili  x   /  AST  21  /  ALT  11  /  AlkPhos  54  09-09    LIVER FUNCTIONS - ( 09 Sep 2020 05:16 )  Alb: 4.1 g/dL / Pro: 6.8 g/dL / ALK PHOS: 54 U/L / ALT: 11 U/L / AST: 21 U/L / GGT: x           PT/INR - ( 09 Sep 2020 05:16 )   PT: 25.8 sec;   INR: 2.25 ratio         PTT - ( 09 Sep 2020 05:16 )  PTT:34.2 sec        Imaging:    < from: Upper Endoscopy (07.13.20 @ 09:09) >  Findings:       The examined esophagus was normal.       The Z-line was regular and was found 38 cm from the incisors.       A 2 cm hiatus hernia was present.       The entire examined stomach was normal.       The examined duodenumwas normal.                                                                                                        Impression:          - Normal esophagus.                       - Z-line regular, 38 cm from the incisors.                       - 2 cm hiatus hernia.                       - Normal stomach.                       - Normal examined duodenum.                       - No specimens collected.    < end of copied text >  < from: Colonoscopy (07.13.20 @ 09:09) >  Findings:       A large amount of dark green liquid and solid stool was found in the entire colon interfering        with visualization.       The colon was otherwise without abnormality.       Liquid brown stool was found in the terminal ileum.       Non-bleeding internal hemorrhoids were found during retroflexion. The hemorrhoids were small.                                                                                                        Impression:          - Stool in the entire examined colon and terminal ileum.                       - No active bleeding or source of bleeding identified in the colon.                       - Non-bleeding internal hemorrhoids.                       - No specimens collected.    < end of copied text >  < from: Capsule Endoscopy (07.15.20 @ 14:17) >  Findings:       Images of the esophagus, stomach and small bowel were obtained from the swallowed capsule and        reviewed.       No significant pathology seen in the examined small bowel. No signs of bleeding seen in the        small intestine.                                                     Impression:          - Normal video capsule endoscopy.    < end of copied text >  < from: CT Abdomen and Pelvis w/ IV Cont (09.08.20 @ 03:42) >  IMPRESSION:  No acute intra-abdominal pathology.    Interval new cluster of left lower lobe nodular opacities, which may be secondary to mucoid impacted distal airways. Trace debris in the trachea and right mainstem bronchus.    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    < end of copied text >      < from: CT Head No Cont (09.08.20 @ 17:53) >  Impression:    No acute intracranial hemorrhage.  New right premaxillary soft tissue hematoma measuring 1.3 x 1.5 x 2.6 cm.    < end of copied text >

## 2020-09-09 NOTE — PROGRESS NOTE ADULT - SUBJECTIVE AND OBJECTIVE BOX
VITAL SIGNS    Telemetry:  nsr 80-90, wct    Vital Signs Last 24 Hrs  T(C): 37.2 (09-09-20 @ 05:42), Max: 37.8 (09-08-20 @ 19:08)  T(F): 99 (09-09-20 @ 05:42), Max: 100 (09-08-20 @ 19:08)  HR: 95 (09-09-20 @ 05:42) (81 - 95)  BP: 84/63 (09-08-20 @ 14:09) (84/63 - 86/61)  RR: 18 (09-09-20 @ 05:42) (16 - 18)  SpO2: 96% (09-09-20 @ 05:42) (95% - 98%)                   09-08 @ 07:01  -  09-09 @ 07:00  --------------------------------------------------------  IN: 600 mL / OUT: 1350 mL / NET: -750 mL          Daily     Daily             CAPILLARY BLOOD GLUCOSE                Drains:            Coumadin    [x ] YES          [  ]      NO         on hold                          PHYSICAL EXAM        Neurology: alert and oriented x 3, nonfocal, no gross deficits  CV : s1 s2 RRR, + humm  Sternal Wound :  CDI , Stable  Lungs: cta  Abdomen: soft, nontender, nondistended, positive bowel sounds driveline cdi                       :    voiding        Extremities:     -edema   /  -   calve tenderness ,            acetaminophen   Tablet .. 650 milliGRAM(s) Oral every 6 hours PRN  aMIOdarone    Tablet 200 milliGRAM(s) Oral daily  cyanocobalamin 1000 MICROGram(s) Oral daily  ferrous    sulfate 325 milliGRAM(s) Oral three times a day  finasteride 5 milliGRAM(s) Oral daily  folic acid 1 milliGRAM(s) Oral daily  influenza   Vaccine 0.5 milliLiter(s) IntraMuscular once  lisinopril 5 milliGRAM(s) Oral daily  lisinopril 2.5 milliGRAM(s) Oral at bedtime  metoprolol succinate ER 25 milliGRAM(s) Oral daily  mirtazapine 7.5 milliGRAM(s) Oral at bedtime  ofloxacin 0.3% Solution 5 Drop(s) Left Ear two times a day  oxyCODONE    IR 2.5 milliGRAM(s) Oral four times a day PRN  pantoprazole    Tablet 40 milliGRAM(s) Oral every 12 hours  senna 2 Tablet(s) Oral at bedtime  spironolactone 25 milliGRAM(s) Oral daily                    Physical Therapy Rec:   Home  [  ]   Home w/ PT  [  ]  Rehab  [  ]  Discussed with Cardiothoracic Team at AM rounds.

## 2020-09-09 NOTE — PROGRESS NOTE ADULT - PROBLEM SELECTOR PLAN 2
- Continue Lisinopril 5 mg in AM and 2.5 mg in PM PO daily for tighter BP control to control bleeding  - Continue home Metoprolol succinate 25 mg daily  - Continue home Aldactone 25 mg daily. - Adjusted Lisinopril 2.5 mg BID today  - Continue home Metoprolol succinate 25 mg daily  - Continue home Aldactone 25 mg daily. - Adjusted Lisinopril 2.5 mg BID today to allow for blood pressure to be better controlled  - Continue home Metoprolol succinate 25 mg daily  - Continue home Aldactone 25 mg daily.

## 2020-09-09 NOTE — PROGRESS NOTE ADULT - ASSESSMENT
65M PMH HF due to NICM HM2 LVAD (2017) on coumadin, TV annuloplasty ring, SIADH, CKD3, was readmitted after fall and stating he had BRBPR. Upon admission patient was noted to have H/H 6.3/21.9, was transfused with 2uPRBC (last on 9/8), with appropriate response. GI consulted and will likely require repeat EGD/ Colonoscopy. CT of IAC revealed left EAC fracture with hemorrhage, which required cauterization and packing.

## 2020-09-09 NOTE — PROGRESS NOTE ADULT - PROBLEM SELECTOR PLAN 3
- Continue at current speed (8800)  - Re-admitted with supra-therapeutic INR of 5.14, s/p 2unts of FFP (last on 9/8).   - Continue to hold Coumadin and ASA for bleeding.    - Continue to monitor LDH levels daily.

## 2020-09-09 NOTE — PROGRESS NOTE ADULT - PROBLEM SELECTOR PLAN 1
Continue with otic floxin gtts (even with packing in ear)  INR goals discussed with CTsurge  Maintain BP  ENT will continue to follow  Packing likely to be removed Thursday or Friday

## 2020-09-09 NOTE — PROGRESS NOTE ADULT - ASSESSMENT
65M PMH HF due to NICM HM2 LVAD (2017) on coumadin, TV annuloplasty ring, SIADH, CKD3, presenting after fall. Patient reports he was reaching for ensure in the refrigerator and fell onto the floor. Patient does not remember falling , questionably sustained a syncopal event, reports being helped by his friend who he lives with to his bed, who then called a taxi to bring him to the ED. Does not recall additional details of events. Currently complaining of pain associated with swelling on is face, as well as pain on his left flank/abdomen.     INTERVAL EVENTS: Mentating at baseline in ED, admitted to thoracic surgery for correction of supratherapeutic INR and resuscitation for anemia. CT max-face and CT internal auditory canal revealed acute posterior displaced fracture of anterior wall of left external auditory canal, and right infraorbital soft tissue swelling.   9/8 < from: CT Maxillofacial No Cont (09.07.20 @ 18:41) >  CT IAC: Acute posteriorly displaced fracture of the anterior wall of the left external auditory canal. Partial opacification of the external auditory canal, likely with blood products.    CT maxillofacial: No acute maxillofacial bone fracture. Right facial/infraorbital soft tissue swelling.    < end of copied text >    Repeat head CT tonight  Pt with bleeding from L ear, ENT evaluating, packed x 2. No surgical intervention  INR up to 3.0, additional ffp x 1  9/9 L ear clot noted, no further active bleeding    CT : < from: CT Head No Cont (09.08.20 @ 17:53) >  No acute intracranial hemorrhage.  New right premaxillary soft tissue hematoma measuring 1.3 x 1.5 x 2.6 cm.    < end of copied text >    Hold further coumadin for now  Additional lisinopril added last rik for bp control

## 2020-09-09 NOTE — PHYSICAL THERAPY INITIAL EVALUATION ADULT - ADDITIONAL COMMENTS
Pt w/ PMhx HFrEF secondary to NICM s/p HM2 LVAD (2017) on coumadin, TV annuloplasty ring, SIADH, & CKD3. Pt w/ add'l recent admission for iron deficiency anemia & negative pan-endoscopy.    PTA pt was Pt w/ PMhx HFrEF secondary to NICM s/p HM2 LVAD (2017) on coumadin, TV annuloplasty ring, SIADH, & CKD3. Pt w/ add'l recent admission for iron deficiency anemia & negative pan-endoscopy.    PTA pt was indep w/ all ADLs with use of straight cane. Pt lives alone in a private home with 7 steps to enter, +grab bars in bathroom, wears glasses, hearing aides and is R handed.

## 2020-09-09 NOTE — PHYSICAL THERAPY INITIAL EVALUATION ADULT - PERTINENT HX OF CURRENT PROBLEM, REHAB EVAL
Pt is a 66 yo HM2 LVAD (2017) M admitted to ER 9/7 w/ pt reports of a possible syncopal event, roommate helped him to his bed, called a taxi to bring him to the ED. Does not recall additional details of events. Pt w/ c/o facial swelling & left flank/abdomen pain. CT (+) internal auditory canal revealed acute posterior displaced fracture of anterior wall of left external auditory canal, and right infraorbital soft tissue swelling.

## 2020-09-09 NOTE — PROGRESS NOTE ADULT - SUBJECTIVE AND OBJECTIVE BOX
CC: Left Ear Hemorrhage s/p Fall.    HPI: 66 YO M with PMH of ACC/AHA stage D HF due to NICM HM2 LVAD, TV annuloplasty ring 9/12/17 as destination therapy due to severe peripheral artery disease with significant stenosis SIADH, Depression, CKD-3 with hyperkalemia, past E. coli UTIs and recently hospitalized in April due to COVID-19. Pt presents to [facility] ED s/p fall. ENT was consulted for evaluation of Left EAC fracture and hemorrhage. Pt admits to feeling tinnitus, pain on the affected side. Also c/o Right facial swelling. Takes Coumadin at home. Otherwise denies Dizziness/Vertigo/Nystagmus, dysphagia/  odynophagia N/V, fever/chills, cough/ rhinorrhea/ salty/ metallic taste in mouth, CP/SOB/ Palpitations/  Diaphoresis, Abdominal Pain/Diarrhea, recent travel/sick contacts.           PAST MEDICAL & SURGICAL HISTORY:  GI bleed  Vertigo  H/O epistaxis  Iron deficiency anemia  CKD (chronic kidney disease), stage III  Clavicle fracture  Falls  Anticoagulation goal of INR 2.0 to 2.5  ACC/AHA stage D systolic heart failure  BPH with urinary obstruction  Claudication  Peripheral arterial disease  Bleeding hemorrhoids  Hemorrhoids  History of 2019 novel coronavirus disease (COVID-19): april 2020  Pleural effusion  Depression  CAD (coronary artery disease)  CHF (congestive heart failure)  S/P endoscopy  S/P ventricular assist device  S/P TVR (tricuspid valve repair)    Allergies    No Known Allergies    Intolerances      MEDICATIONS  (STANDING):  aMIOdarone    Tablet 200 milliGRAM(s) Oral daily  cyanocobalamin 1000 MICROGram(s) Oral daily  ferrous    sulfate 325 milliGRAM(s) Oral three times a day  finasteride 5 milliGRAM(s) Oral daily  folic acid 1 milliGRAM(s) Oral daily  influenza   Vaccine 0.5 milliLiter(s) IntraMuscular once  lisinopril 2.5 milliGRAM(s) Oral two times a day  metoprolol succinate ER 25 milliGRAM(s) Oral daily  mirtazapine 7.5 milliGRAM(s) Oral at bedtime  ofloxacin 0.3% Solution 5 Drop(s) Left Ear two times a day  pantoprazole    Tablet 40 milliGRAM(s) Oral every 12 hours  potassium chloride    Tablet ER 30 milliEquivalent(s) Oral once  senna 2 Tablet(s) Oral at bedtime  spironolactone 25 milliGRAM(s) Oral daily    MEDICATIONS  (PRN):  acetaminophen   Tablet .. 650 milliGRAM(s) Oral every 6 hours PRN Temp greater or equal to 38.5C (101.3F), Mild Pain (1 - 3)  oxyCODONE    IR 2.5 milliGRAM(s) Oral four times a day PRN Moderate Pain (4 - 6)      Social History: see consult    Family history: see consult    ROS:   ENT: all negative except as noted in HPI   Pulm: denies SOB, cough, hemoptysis  Neuro: denies numbness/tingling, loss of sensation  Endo: denies heat/cold intolerance, excessive sweating      Vital Signs Last 24 Hrs  T(C): 37.2 (09 Sep 2020 09:00), Max: 37.8 (08 Sep 2020 19:08)  T(F): 98.9 (09 Sep 2020 09:00), Max: 100 (08 Sep 2020 19:08)  HR: 90 (09 Sep 2020 09:00) (81 - 95)  BP: 84/63 (08 Sep 2020 14:09) (84/63 - 84/65)  BP(mean): 76 (09 Sep 2020 09:00) (66 - 78)  RR: 18 (09 Sep 2020 09:00) (16 - 18)  SpO2: 95% (09 Sep 2020 09:00) (95% - 98%)                          8.5    10.24 )-----------( 167      ( 09 Sep 2020 05:16 )             27.0    09-09    132<L>  |  96  |  27<H>  ----------------------------<  96  3.9   |  24  |  1.11    Ca    9.0      09 Sep 2020 05:16  Phos  3.5     09-08  Mg     2.2     09-08    TPro  6.8  /  Alb  4.1  /  TBili  0.4  /  DBili  x   /  AST  21  /  ALT  11  /  AlkPhos  54  09-09   PT/INR - ( 09 Sep 2020 05:16 )   PT: 25.8 sec;   INR: 2.25 ratio         PTT - ( 09 Sep 2020 05:16 )  PTT:34.2 sec    PHYSICAL EXAM:  Gen: NAD  Skin: Right infra orbital Ecchymosis.   Head: Normocephalic, Atraumatic.  Face: Right facial swelling. No erythema, or fluctuance. Parotid glands soft without mass.  Eyes: no scleral injection.  Ears: Right - ear canal clear, TM intact without effusion or erythema. No evidence of any fluid drainage. No mastoid tenderness, erythema, or ear bulging          Left - EAC packed with xeroform gauze with dried blood on aniya and antihelix. No active bleeding.   Nose: Nares bilaterally patent, no discharge. No mastoid tenderness, erythema, or ear bulging.  Mouth: No Stridor / Drooling / Trismus.  Mucosa moist, tongue/uvula midline, oropharynx clear.  Neck: Flat, supple, no lymphadenopathy, trachea midline, no masses.  Lymphatic: No lymphadenopathy.  Resp: breathing easily, no stridor.  CV: no peripheral edema/cyanosis.  GI: nondistended.   Peripheral vascular: no JVD or edema.  Neuro: facial nerve intact, no facial droop. CC: Left Ear Hemorrhage s/p Fall.    HPI: 66 YO M with PMH of ACC/AHA stage D HF due to NICM HM2 LVAD, TV annuloplasty ring 9/12/17 as destination therapy due to severe peripheral artery disease with significant stenosis SIADH, Depression, CKD-3 with hyperkalemia, past E. coli UTIs and recently hospitalized in April due to COVID-19. Pt presents to [facility] ED s/p fall. ENT was consulted for evaluation of Left EAC fracture and hemorrhage. Pt admits to feeling tinnitus, pain on the affected side. Also c/o Right facial swelling. Takes Coumadin at home. Otherwise denies Dizziness/Vertigo/Nystagmus, dysphagia/  odynophagia N/V, fever/chills, cough/ rhinorrhea/ salty/ metallic taste in mouth, CP/SOB/ Palpitations/  Diaphoresis, Abdominal Pain/Diarrhea, recent travel/sick contacts.           PAST MEDICAL & SURGICAL HISTORY:  GI bleed  Vertigo  H/O epistaxis  Iron deficiency anemia  CKD (chronic kidney disease), stage III  Clavicle fracture  Falls  Anticoagulation goal of INR 2.0 to 2.5  ACC/AHA stage D systolic heart failure  BPH with urinary obstruction  Claudication  Peripheral arterial disease  Bleeding hemorrhoids  Hemorrhoids  History of 2019 novel coronavirus disease (COVID-19): april 2020  Pleural effusion  Depression  CAD (coronary artery disease)  CHF (congestive heart failure)  S/P endoscopy  S/P ventricular assist device  S/P TVR (tricuspid valve repair)    Allergies    No Known Allergies    Intolerances      MEDICATIONS  (STANDING):  aMIOdarone    Tablet 200 milliGRAM(s) Oral daily  cyanocobalamin 1000 MICROGram(s) Oral daily  ferrous    sulfate 325 milliGRAM(s) Oral three times a day  finasteride 5 milliGRAM(s) Oral daily  folic acid 1 milliGRAM(s) Oral daily  influenza   Vaccine 0.5 milliLiter(s) IntraMuscular once  lisinopril 2.5 milliGRAM(s) Oral two times a day  metoprolol succinate ER 25 milliGRAM(s) Oral daily  mirtazapine 7.5 milliGRAM(s) Oral at bedtime  ofloxacin 0.3% Solution 5 Drop(s) Left Ear two times a day  pantoprazole    Tablet 40 milliGRAM(s) Oral every 12 hours  potassium chloride    Tablet ER 30 milliEquivalent(s) Oral once  senna 2 Tablet(s) Oral at bedtime  spironolactone 25 milliGRAM(s) Oral daily    MEDICATIONS  (PRN):  acetaminophen   Tablet .. 650 milliGRAM(s) Oral every 6 hours PRN Temp greater or equal to 38.5C (101.3F), Mild Pain (1 - 3)  oxyCODONE    IR 2.5 milliGRAM(s) Oral four times a day PRN Moderate Pain (4 - 6)      Social History: see consult    Family history: see consult    ROS:   ENT: all negative except as noted in HPI   Pulm: denies SOB, cough, hemoptysis  Neuro: denies numbness/tingling, loss of sensation  Endo: denies heat/cold intolerance, excessive sweating      Vital Signs Last 24 Hrs  T(C): 37.2 (09 Sep 2020 09:00), Max: 37.8 (08 Sep 2020 19:08)  T(F): 98.9 (09 Sep 2020 09:00), Max: 100 (08 Sep 2020 19:08)  HR: 90 (09 Sep 2020 09:00) (81 - 95)  BP: 84/63 (08 Sep 2020 14:09) (84/63 - 84/65)  BP(mean): 76 (09 Sep 2020 09:00) (66 - 78)  RR: 18 (09 Sep 2020 09:00) (16 - 18)  SpO2: 95% (09 Sep 2020 09:00) (95% - 98%)                          8.5    10.24 )-----------( 167      ( 09 Sep 2020 05:16 )             27.0    09-09    132<L>  |  96  |  27<H>  ----------------------------<  96  3.9   |  24  |  1.11    Ca    9.0      09 Sep 2020 05:16  Phos  3.5     09-08  Mg     2.2     09-08    TPro  6.8  /  Alb  4.1  /  TBili  0.4  /  DBili  x   /  AST  21  /  ALT  11  /  AlkPhos  54  09-09   PT/INR - ( 09 Sep 2020 05:16 )   PT: 25.8 sec;   INR: 2.25 ratio         PTT - ( 09 Sep 2020 05:16 )  PTT:34.2 sec    PHYSICAL EXAM:  Gen: NAD  Skin: Right infra orbital Ecchymosis.   Head: Normocephalic, Atraumatic.  Face: Right facial swelling. No erythema, or fluctuance. Parotid glands soft without mass.  Eyes: no scleral injection.  Ears: Right - ear canal clear, TM intact without effusion or erythema. No evidence of any fluid drainage. No mastoid tenderness, erythema, or ear bulging          Left - EAC packed with xeroform gauze with dried blood on aniya and antihelix. No active bleeding.   Nose: Nares bilaterally patent, no discharge. No mastoid tenderness, erythema, or ear bulging.  Mouth: +Trismus. No Stridor / Drooling.  Mucosa moist, tongue/uvula midline, oropharynx clear.  Neck: Flat, supple, no lymphadenopathy, trachea midline, no masses.  Lymphatic: No lymphadenopathy.  Resp: breathing easily, no stridor.  CV: no peripheral edema/cyanosis.  GI: nondistended.   Peripheral vascular: no JVD or edema.  Neuro: facial nerve intact, no facial droop.

## 2020-09-09 NOTE — PROGRESS NOTE ADULT - PROBLEM SELECTOR PLAN 5
- Re-admitted with H/H 6.3/21.9 s/p 2uPRBC last on 9/8  - States that he had BRBPR at home on 9/7.   - Previously was admitted in July 2020 for GI bleed. Underwent EGD and Colonoscopy on 7/13 no active bleeding or source of bleeding was identified  - GI reconsulted, awaiting further recommendations - Re-admitted with H/H 6.3/21.9 s/p 2uPRBC last on 9/8  - States that he had BRBPR at home on 9/7.   - Previously was admitted in July 2020 for GI bleed. Underwent EGD and Colonoscopy on 7/13 no active bleeding or source of bleeding was identified  - GI reconsulted, appreciate recommendations  - Will check Iron studies and if needed will replete with IV iron - Re-admitted with H/H 6.3/21.9 s/p 2uPRBC last on 9/8  - States that he had BRBPR at home on 9/7.   - Previously was admitted in July 2020 for GI bleed. Underwent EGD and Colonoscopy on 7/13 no active bleeding or source of bleeding was identified  - GI reconsulted, appreciate recommendations  - Recommended having iron studies completed, however holding off as patient was  transfused with 2uPRBC on 9/7  - Can consider repeat EGD/ colonoscopy if patient continues to require transfusions

## 2020-09-09 NOTE — PROGRESS NOTE ADULT - SUBJECTIVE AND OBJECTIVE BOX
Subjective: Patient seen and examined resting in bed. ON no issues.     Medications:  acetaminophen   Tablet .. 650 milliGRAM(s) Oral every 6 hours PRN  aMIOdarone    Tablet 200 milliGRAM(s) Oral daily  cyanocobalamin 1000 MICROGram(s) Oral daily  ferrous    sulfate 325 milliGRAM(s) Oral three times a day  finasteride 5 milliGRAM(s) Oral daily  folic acid 1 milliGRAM(s) Oral daily  influenza   Vaccine 0.5 milliLiter(s) IntraMuscular once  lisinopril 5 milliGRAM(s) Oral daily  lisinopril 2.5 milliGRAM(s) Oral at bedtime  metoprolol succinate ER 25 milliGRAM(s) Oral daily  mirtazapine 7.5 milliGRAM(s) Oral at bedtime  ofloxacin 0.3% Solution 5 Drop(s) Left Ear two times a day  oxyCODONE    IR 2.5 milliGRAM(s) Oral four times a day PRN  pantoprazole    Tablet 40 milliGRAM(s) Oral every 12 hours  senna 2 Tablet(s) Oral at bedtime  spironolactone 25 milliGRAM(s) Oral daily      Vitals:  Vital Signs Last 24 Hours  T(C): 37.2 (09-09-20 @ 05:42), Max: 37.8 (09-08-20 @ 19:08)  HR: 95 (09-09-20 @ 05:42) (81 - 95)  BP: 84/63 (09-08-20 @ 14:09) (84/63 - 86/61)  RR: 18 (09-09-20 @ 05:42) (16 - 18)  SpO2: 96% (09-09-20 @ 05:42) (95% - 98%)    Tele: SR 80-90 (14 beats wide complex tachycardia)    I&O's Summary    08 Sep 2020 07:01  -  09 Sep 2020 07:00  --------------------------------------------------------  IN: 600 mL / OUT: 1350 mL / NET: -750 mL      Physical Exam  General: No distress. Comfortable.  HEENT: Right facial swelling along with infra orbital ecchymosis. left ear noted to have packing in the canal    Neck: Neck supple. JVP not appreciated. No masses  Chest: Clear to auscultation bilaterally  CV: +VAD hum  Abdomen: Soft, non-distended, mild left sided tenderness without rebound or guarding  Skin: No rashes or skin breakdown  Neurology: Alert and oriented times three. Sensation intact    LVAD Interrogation  VAD TYPE HM 2  Speed 8800  Flow 4.9  Power 4.9  PI  6  Assessment of driveline exit site: driveline exit site, c/d/i  Programming changes: no changes made    Labs:                        8.5    10.24 )-----------( 167      ( 09 Sep 2020 05:16 )             27.0     09-09    132<L>  |  96  |  27<H>  ----------------------------<  96  3.9   |  24  |  1.11    Ca    9.0      09 Sep 2020 05:16  Phos  3.5     09-08  Mg     2.2     09-08    TPro  6.8  /  Alb  4.1  /  TBili  0.4  /  DBili  x   /  AST  21  /  ALT  11  /  AlkPhos  54  09-09    PT/INR - ( 09 Sep 2020 05:16 )   PT: 25.8 sec;   INR: 2.25 ratio         PTT - ( 09 Sep 2020 05:16 )  PTT:34.2 sec      Lactate Dehydrogenase, Serum: 212 U/L (09-09 @ 05:16)  Lactate Dehydrogenase, Serum: 215 U/L (09-08 @ 23:10)  Lactate Dehydrogenase, Serum: 229 U/L (09-08 @ 03:00)  Lactate Dehydrogenase, Serum: 311 U/L (09-07 @ 17:02)      Imaging Studies     < from: CT Head No Cont (09.08.20 @ 17:53) >  Impression:  No acute intracranial hemorrhage.  New right premaxillary soft tissue hematoma measuring 1.3 x 1.5 x 2.6 cm.  < end of copied text > Subjective: Patient seen and examined resting in bed. ON no issues.     Medications:  acetaminophen   Tablet .. 650 milliGRAM(s) Oral every 6 hours PRN  aMIOdarone    Tablet 200 milliGRAM(s) Oral daily  cyanocobalamin 1000 MICROGram(s) Oral daily  ferrous    sulfate 325 milliGRAM(s) Oral three times a day  finasteride 5 milliGRAM(s) Oral daily  folic acid 1 milliGRAM(s) Oral daily  influenza   Vaccine 0.5 milliLiter(s) IntraMuscular once  lisinopril 5 milliGRAM(s) Oral daily  lisinopril 2.5 milliGRAM(s) Oral at bedtime  metoprolol succinate ER 25 milliGRAM(s) Oral daily  mirtazapine 7.5 milliGRAM(s) Oral at bedtime  ofloxacin 0.3% Solution 5 Drop(s) Left Ear two times a day  oxyCODONE    IR 2.5 milliGRAM(s) Oral four times a day PRN  pantoprazole    Tablet 40 milliGRAM(s) Oral every 12 hours  senna 2 Tablet(s) Oral at bedtime  spironolactone 25 milliGRAM(s) Oral daily      Vitals:  Vital Signs Last 24 Hours  T(C): 37.2 (09-09-20 @ 05:42), Max: 37.8 (09-08-20 @ 19:08)  HR: 95 (09-09-20 @ 05:42) (81 - 95)  BP: 84/63 (09-08-20 @ 14:09) (84/63 - 86/61)  RR: 18 (09-09-20 @ 05:42) (16 - 18)  SpO2: 96% (09-09-20 @ 05:42) (95% - 98%)    Tele: SR 80-90 (14 beats wide complex tachycardia)    I&O's Summary    08 Sep 2020 07:01  -  09 Sep 2020 07:00  --------------------------------------------------------  IN: 600 mL / OUT: 1350 mL / NET: -750 mL      Physical Exam  General: No distress. Comfortable.  HEENT: Right facial swelling along with infra orbital ecchymosis. left ear noted to have packing in the canal    Neck: Neck supple. No JVP noted. No masses  Chest: Clear to auscultation bilaterally  CV: +VAD hum  Abdomen: Soft, non-distended, mild left sided tenderness without rebound or guarding  Skin: No rashes or skin breakdown  Neurology: Alert and oriented times three. Sensation intact    LVAD Interrogation  VAD TYPE HM 2  Speed 8800  Flow 4.9  Power 4.9  PI  6  Assessment of driveline exit site: driveline exit site, c/d/i  Programming changes: no changes made    Labs:                        8.5    10.24 )-----------( 167      ( 09 Sep 2020 05:16 )             27.0     09-09    132<L>  |  96  |  27<H>  ----------------------------<  96  3.9   |  24  |  1.11    Ca    9.0      09 Sep 2020 05:16  Phos  3.5     09-08  Mg     2.2     09-08    TPro  6.8  /  Alb  4.1  /  TBili  0.4  /  DBili  x   /  AST  21  /  ALT  11  /  AlkPhos  54  09-09    PT/INR - ( 09 Sep 2020 05:16 )   PT: 25.8 sec;   INR: 2.25 ratio         PTT - ( 09 Sep 2020 05:16 )  PTT:34.2 sec      Lactate Dehydrogenase, Serum: 212 U/L (09-09 @ 05:16)  Lactate Dehydrogenase, Serum: 215 U/L (09-08 @ 23:10)  Lactate Dehydrogenase, Serum: 229 U/L (09-08 @ 03:00)  Lactate Dehydrogenase, Serum: 311 U/L (09-07 @ 17:02)      Imaging Studies     < from: CT Head No Cont (09.08.20 @ 17:53) >  Impression:  No acute intracranial hemorrhage.  New right premaxillary soft tissue hematoma measuring 1.3 x 1.5 x 2.6 cm.  < end of copied text >

## 2020-09-10 ENCOUNTER — APPOINTMENT (OUTPATIENT)
Dept: HEART FAILURE | Facility: CLINIC | Age: 65
End: 2020-09-10

## 2020-09-10 DIAGNOSIS — H92.22 OTORRHAGIA, LEFT EAR: ICD-10-CM

## 2020-09-10 DIAGNOSIS — R10.9 UNSPECIFIED ABDOMINAL PAIN: ICD-10-CM

## 2020-09-10 LAB
ALBUMIN SERPL ELPH-MCNC: 3.6 G/DL — SIGNIFICANT CHANGE UP (ref 3.3–5)
ALP SERPL-CCNC: 58 U/L — SIGNIFICANT CHANGE UP (ref 40–120)
ALT FLD-CCNC: 13 U/L — SIGNIFICANT CHANGE UP (ref 10–45)
ANION GAP SERPL CALC-SCNC: 12 MMOL/L — SIGNIFICANT CHANGE UP (ref 5–17)
APTT BLD: 33.3 SEC — SIGNIFICANT CHANGE UP (ref 27.5–35.5)
AST SERPL-CCNC: 20 U/L — SIGNIFICANT CHANGE UP (ref 10–40)
BILIRUB SERPL-MCNC: 0.4 MG/DL — SIGNIFICANT CHANGE UP (ref 0.2–1.2)
BLD GP AB SCN SERPL QL: NEGATIVE — SIGNIFICANT CHANGE UP
BUN SERPL-MCNC: 23 MG/DL — SIGNIFICANT CHANGE UP (ref 7–23)
CALCIUM SERPL-MCNC: 9 MG/DL — SIGNIFICANT CHANGE UP (ref 8.4–10.5)
CHLORIDE SERPL-SCNC: 97 MMOL/L — SIGNIFICANT CHANGE UP (ref 96–108)
CO2 SERPL-SCNC: 21 MMOL/L — LOW (ref 22–31)
CREAT SERPL-MCNC: 1.18 MG/DL — SIGNIFICANT CHANGE UP (ref 0.5–1.3)
GLUCOSE SERPL-MCNC: 102 MG/DL — HIGH (ref 70–99)
HCT VFR BLD CALC: 26.7 % — LOW (ref 39–50)
HGB BLD-MCNC: 8.3 G/DL — LOW (ref 13–17)
INR BLD: 2.05 RATIO — HIGH (ref 0.88–1.16)
LDH SERPL L TO P-CCNC: 223 U/L — SIGNIFICANT CHANGE UP (ref 50–242)
MCHC RBC-ENTMCNC: 29.7 PG — SIGNIFICANT CHANGE UP (ref 27–34)
MCHC RBC-ENTMCNC: 31.1 GM/DL — LOW (ref 32–36)
MCV RBC AUTO: 95.7 FL — SIGNIFICANT CHANGE UP (ref 80–100)
NRBC # BLD: 0 /100 WBCS — SIGNIFICANT CHANGE UP (ref 0–0)
PLATELET # BLD AUTO: 175 K/UL — SIGNIFICANT CHANGE UP (ref 150–400)
POTASSIUM SERPL-MCNC: 4.2 MMOL/L — SIGNIFICANT CHANGE UP (ref 3.5–5.3)
POTASSIUM SERPL-SCNC: 4.2 MMOL/L — SIGNIFICANT CHANGE UP (ref 3.5–5.3)
PROT SERPL-MCNC: 6.7 G/DL — SIGNIFICANT CHANGE UP (ref 6–8.3)
PROTHROM AB SERPL-ACNC: 23.6 SEC — HIGH (ref 10.6–13.6)
RBC # BLD: 2.79 M/UL — LOW (ref 4.2–5.8)
RBC # FLD: 20.9 % — HIGH (ref 10.3–14.5)
RH IG SCN BLD-IMP: POSITIVE — SIGNIFICANT CHANGE UP
SODIUM SERPL-SCNC: 130 MMOL/L — LOW (ref 135–145)
WBC # BLD: 7.87 K/UL — SIGNIFICANT CHANGE UP (ref 3.8–10.5)
WBC # FLD AUTO: 7.87 K/UL — SIGNIFICANT CHANGE UP (ref 3.8–10.5)

## 2020-09-10 PROCEDURE — 99233 SBSQ HOSP IP/OBS HIGH 50: CPT | Mod: 25

## 2020-09-10 PROCEDURE — 99232 SBSQ HOSP IP/OBS MODERATE 35: CPT | Mod: GC

## 2020-09-10 PROCEDURE — 93750 INTERROGATION VAD IN PERSON: CPT

## 2020-09-10 PROCEDURE — 99232 SBSQ HOSP IP/OBS MODERATE 35: CPT

## 2020-09-10 PROCEDURE — 74018 RADEX ABDOMEN 1 VIEW: CPT | Mod: 26

## 2020-09-10 RX ORDER — SORBITOL SOLUTION 70 %
30 SOLUTION, ORAL MISCELLANEOUS ONCE
Refills: 0 | Status: COMPLETED | OUTPATIENT
Start: 2020-09-10 | End: 2020-09-10

## 2020-09-10 RX ORDER — LISINOPRIL 2.5 MG/1
2.5 TABLET ORAL
Refills: 0 | Status: DISCONTINUED | OUTPATIENT
Start: 2020-09-10 | End: 2020-09-10

## 2020-09-10 RX ORDER — SORBITOL SOLUTION 70 %
10 SOLUTION, ORAL MISCELLANEOUS ONCE
Refills: 0 | Status: COMPLETED | OUTPATIENT
Start: 2020-09-10 | End: 2020-09-10

## 2020-09-10 RX ORDER — WARFARIN SODIUM 2.5 MG/1
2 TABLET ORAL ONCE
Refills: 0 | Status: COMPLETED | OUTPATIENT
Start: 2020-09-10 | End: 2020-09-10

## 2020-09-10 RX ORDER — LISINOPRIL 2.5 MG/1
2.5 TABLET ORAL
Refills: 0 | Status: DISCONTINUED | OUTPATIENT
Start: 2020-09-10 | End: 2020-09-17

## 2020-09-10 RX ORDER — POLYETHYLENE GLYCOL 3350 17 G/17G
17 POWDER, FOR SOLUTION ORAL
Refills: 0 | Status: DISCONTINUED | OUTPATIENT
Start: 2020-09-10 | End: 2020-09-18

## 2020-09-10 RX ADMIN — Medication 10 MILLILITER(S): at 14:56

## 2020-09-10 RX ADMIN — FINASTERIDE 5 MILLIGRAM(S): 5 TABLET, FILM COATED ORAL at 13:05

## 2020-09-10 RX ADMIN — Medication 30 MILLILITER(S): at 15:17

## 2020-09-10 RX ADMIN — Medication 5 DROP(S): at 17:27

## 2020-09-10 RX ADMIN — Medication 325 MILLIGRAM(S): at 06:08

## 2020-09-10 RX ADMIN — MIRTAZAPINE 7.5 MILLIGRAM(S): 45 TABLET, ORALLY DISINTEGRATING ORAL at 21:50

## 2020-09-10 RX ADMIN — Medication 25 MILLIGRAM(S): at 06:08

## 2020-09-10 RX ADMIN — PREGABALIN 1000 MICROGRAM(S): 225 CAPSULE ORAL at 13:05

## 2020-09-10 RX ADMIN — PANTOPRAZOLE SODIUM 40 MILLIGRAM(S): 20 TABLET, DELAYED RELEASE ORAL at 17:27

## 2020-09-10 RX ADMIN — PANTOPRAZOLE SODIUM 40 MILLIGRAM(S): 20 TABLET, DELAYED RELEASE ORAL at 06:08

## 2020-09-10 RX ADMIN — LISINOPRIL 2.5 MILLIGRAM(S): 2.5 TABLET ORAL at 10:19

## 2020-09-10 RX ADMIN — WARFARIN SODIUM 2 MILLIGRAM(S): 2.5 TABLET ORAL at 21:50

## 2020-09-10 RX ADMIN — AMIODARONE HYDROCHLORIDE 200 MILLIGRAM(S): 400 TABLET ORAL at 06:09

## 2020-09-10 RX ADMIN — Medication 325 MILLIGRAM(S): at 13:05

## 2020-09-10 RX ADMIN — LISINOPRIL 2.5 MILLIGRAM(S): 2.5 TABLET ORAL at 17:27

## 2020-09-10 RX ADMIN — Medication 1 MILLIGRAM(S): at 13:05

## 2020-09-10 RX ADMIN — Medication 325 MILLIGRAM(S): at 21:50

## 2020-09-10 RX ADMIN — Medication 5 DROP(S): at 06:09

## 2020-09-10 RX ADMIN — SENNA PLUS 2 TABLET(S): 8.6 TABLET ORAL at 21:50

## 2020-09-10 RX ADMIN — SPIRONOLACTONE 25 MILLIGRAM(S): 25 TABLET, FILM COATED ORAL at 06:08

## 2020-09-10 RX ADMIN — POLYETHYLENE GLYCOL 3350 17 GRAM(S): 17 POWDER, FOR SOLUTION ORAL at 17:27

## 2020-09-10 NOTE — PROGRESS NOTE ADULT - ASSESSMENT
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 Reported episodes of GI Bleeding butt currently no overt bleeding since admission after correction of INR.    # Normocytic anemia: hgb stable, no overt bleeding. Had recent complete GI anemia workup which was unrevealing. Currently hgb stable since correcting INR. Hemodynamically stable. Pt with hematoma which may contribute to anemia as well as bleeding from ear. Possible DDx for acute on chronic anemia includes hemorrhoidal bleed, AVM, Dieulafoy bleeding polyp.  # Constipation: no BM since admission with vague lower abdomen discomfort  # LVAD on AC: initially with INR >5 and acute on chronic anemia. Resolved now and INR now in therapeutic range.  # Premaxillary hematoma  # PAD    Recommendations:  - continue with PPI 40mg po BID  - trend CBC, CMP, INR  - transfuse Hgb per heart failure team, Platelets > 50  - monitor CBC while inpatient and if has rectal bleeding and declining hgb, can consider repeat EGD and colonoscopy; at this time risks of anesthesia and procedure may outweigh benefits given recent normal endoscopic evaluation and no active bleeding now  - check iron studies, replete with IV iron as needed  - please give miralax BID with senna and colace especially while on narcotics given decreased gut motility  - no further plans from GI perspective  - supportive care as per primary team Impression:  63 yo male with PMH of AHA stage D HF 2/2 NICM with HM2 LVAD (on coumadin) as destination therapy due to severe PAD, SIADH who presents after a fall found to have a supratherapeutic INR and anemia. Reported episodes of GI Bleeding butt currently no overt bleeding since admission after correction of INR.    # Normocytic anemia: hgb stable, no overt bleeding. Had recent complete GI anemia workup which was unrevealing. Currently hgb stable since correcting INR. Hemodynamically stable. Pt with hematoma which may contribute to anemia as well as bleeding from ear. Possible DDx for acute on chronic anemia includes hemorrhoidal bleed, AVM, Dieulafoy bleeding polyp.  # Constipation: no BM since admission with vague lower abdomen discomfort  # LVAD on AC: initially with INR >5 and acute on chronic anemia. Resolved now and INR now in therapeutic range.  # Premaxillary hematoma  # PAD    Recommendations:  - continue with PPI 40mg po BID  - trend CBC, CMP, INR  - transfuse Hgb per heart failure team, Platelets > 50  - monitor CBC while inpatient and if has rectal bleeding and declining hgb, can consider repeat EGD and colonoscopy; at this time risks of anesthesia and procedure may outweigh benefits given recent normal endoscopic evaluation and no active bleeding now  - check iron studies, replete with IV iron as needed  - please give miralax BID with senna and colace especially while on narcotics given decreased gut motility  - no further plans from GI perspective  - supportive care as per primary team    GI consult e-mail: [email or web address]  GI Iphone: [phone]

## 2020-09-10 NOTE — PROGRESS NOTE ADULT - PROBLEM SELECTOR PLAN 2
- Continue Lisinopril 2.5 mg BID   - Continue home Metoprolol succinate 25 mg daily  - Continue home Aldactone 25 mg daily. - Resumed Lisinopril 2.5 mg BID   - Continue home Metoprolol succinate 25 mg daily  - Continue home Aldactone 25 mg daily.

## 2020-09-10 NOTE — PROGRESS NOTE ADULT - ASSESSMENT
66 YO M s/p fall, L EAC noted to have open fracture (avulsed skin with slightly exposed bone). L EAC suctioned and packed with xeroform gauze (9/9). Now removed. No active bleeding at this time.

## 2020-09-10 NOTE — PROGRESS NOTE ADULT - ASSESSMENT
65M PMH HF due to NICM HM2 LVAD (2017) on coumadin, TV annuloplasty ring, SIADH, CKD3, was readmitted after fall and stating he had BRBPR. Upon admission patient was noted to have H/H 6.3/21.9, was transfused with 2uPRBC (last on 9/8), with appropriate response. GI consulted and will likely require repeat EGD/ Colonoscopy. CT of IAC revealed left EAC fracture with hemorrhage, which required cauterization and packing. 65M PMH HF due to NICM HM2 LVAD (2017) on coumadin, TV annuloplasty ring, SIADH, CKD3, was readmitted after fall and stating he had BRBPR. Upon admission patient was noted to have H/H 6.3/21.9, was transfused with 2uPRBC (last on 9/8), with appropriate response. GI consulted and will likely require repeat EGD/ Colonoscopy. CT of IAC revealed left EAC fracture with hemorrhage, which required cauterization and packing. Will need further investigation on safe discharge for patient as he has had multiple readmission related to falls at home. 65M PMH HF due to NICM HM2 LVAD (2017) on coumadin, TV annuloplasty ring, SIADH, CKD3, was readmitted after fall and stating he had BRBPR. Upon admission patient was noted to have H/H 6.3/21.9, was transfused with 2uPRBC (last on 9/8), with appropriate response. GI consulted and will likely require repeat EGD/ Colonoscopy. CT of IAC revealed left EAC fracture with hemorrhage, which required cauterization and packing. Will need further investigation on safe discharge for patient as he has had multiple readmissions related to falls at home.

## 2020-09-10 NOTE — PROGRESS NOTE ADULT - SUBJECTIVE AND OBJECTIVE BOX
ENT ISSUE/POD: L ear canal open fracture    HPI: 64 YO M s/p fall. ENT was consulted for evaluation of Left EAC fracture and hemorrhage which has been controlled with xeroform packing. Pt admits to feeling tinnitus, pain on the affected side. Also c/o Right facial swelling. Takes Coumadin at home. Otherwise denies Dizziness/Vertigo/Nystagmus, dysphagia/  odynophagia N/V, fever/chills, cough/ rhinorrhea/ salty/ metallic taste in mouth, CP/SOB/ Palpitations/  Diaphoresis, Abdominal Pain/Diarrhea, recent travel/sick contacts.     PAST MEDICAL & SURGICAL HISTORY:  GI bleed  Vertigo  H/O epistaxis  Iron deficiency anemia  CKD (chronic kidney disease), stage III  Clavicle fracture  Falls  Anticoagulation goal of INR 2.0 to 2.5  ACC/AHA stage D systolic heart failure  BPH with urinary obstruction  Claudication  Peripheral arterial disease  Bleeding hemorrhoids  Hemorrhoids  History of 2019 novel coronavirus disease (COVID-19): april 2020  Pleural effusion  Depression  CAD (coronary artery disease)  CHF (congestive heart failure)  S/P endoscopy  S/P ventricular assist device  S/P TVR (tricuspid valve repair)    Allergies    No Known Allergies    Intolerances      MEDICATIONS  (STANDING):  aMIOdarone    Tablet 200 milliGRAM(s) Oral daily  cyanocobalamin 1000 MICROGram(s) Oral daily  ferrous    sulfate 325 milliGRAM(s) Oral three times a day  finasteride 5 milliGRAM(s) Oral daily  folic acid 1 milliGRAM(s) Oral daily  influenza   Vaccine 0.5 milliLiter(s) IntraMuscular once  metoprolol succinate ER 25 milliGRAM(s) Oral daily  mirtazapine 7.5 milliGRAM(s) Oral at bedtime  ofloxacin 0.3% Solution 5 Drop(s) Left Ear two times a day  pantoprazole    Tablet 40 milliGRAM(s) Oral every 12 hours  senna 2 Tablet(s) Oral at bedtime  spironolactone 25 milliGRAM(s) Oral daily    MEDICATIONS  (PRN):  acetaminophen   Tablet .. 650 milliGRAM(s) Oral every 6 hours PRN Temp greater or equal to 38.5C (101.3F), Mild Pain (1 - 3)  oxyCODONE    IR 2.5 milliGRAM(s) Oral four times a day PRN Moderate Pain (4 - 6)      ROS:   ENT: all negative except as noted in HPI   Pulm: denies SOB, cough, hemoptysis  Neuro: denies numbness/tingling, loss of sensation  Endo: denies heat/cold intolerance, excessive sweating      Vital Signs Last 24 Hrs  T(C): 37.2 (10 Sep 2020 07:05), Max: 37.3 (09 Sep 2020 21:24)  T(F): 98.9 (10 Sep 2020 07:05), Max: 99.2 (09 Sep 2020 21:24)  HR: 90 (10 Sep 2020 07:05) (80 - 95)  BP: --  BP(mean): 70 (10 Sep 2020 07:05) (60 - 76)  RR: 18 (10 Sep 2020 07:05) (18 - 18)  SpO2: 96% (10 Sep 2020 07:05) (95% - 100%)                          8.3    7.87  )-----------( 175      ( 10 Sep 2020 03:26 )             26.7    09-10    130<L>  |  97  |  23  ----------------------------<  102<H>  4.2   |  21<L>  |  1.18    Ca    9.0      10 Sep 2020 03:26  Phos  3.5     09-08  Mg     2.3     09-09    TPro  6.7  /  Alb  3.6  /  TBili  0.4  /  DBili  x   /  AST  20  /  ALT  13  /  AlkPhos  58  09-10   PT/INR - ( 10 Sep 2020 03:26 )   PT: 23.6 sec;   INR: 2.05 ratio         PTT - ( 10 Sep 2020 03:26 )  PTT:33.3 sec    PHYSICAL EXAM:  Gen: NAD  Skin: No rashes, bruises, or lesions  Head: Normocephalic, Atraumatic  Face: Right facial swelling. No erythema, or fluctuance. Parotid glands soft without mass.  Eyes: no scleral injection  Ears: Left - ear canal with xeroform removed, canal w fresh blood, no active bleeding. TM intact without effusion or erythema. No evidence of any fluid drainage. No mastoid tenderness, erythema, or ear bulging  Nose: Nares bilaterally patent, no discharge  Mouth: No Stridor / Drooling / Trismus.  Mucosa moist, tongue/uvula midline, oropharynx clear  Neck: Flat, supple, no lymphadenopathy, trachea midline, no masses  Lymphatic: No lymphadenopathy  Resp: breathing easily, no stridor  Neuro: facial nerve intact, no facial droop

## 2020-09-10 NOTE — PROGRESS NOTE ADULT - ASSESSMENT
65M PMH HF due to NICM HM2 LVAD (2017) on coumadin, TV annuloplasty ring, SIADH, CKD3, presenting after fall. Patient reports he was reaching for ensure in the refrigerator and fell onto the floor. Patient does not remember falling , questionably sustained a syncopal event, reports being helped by his friend who he lives with to his bed, who then called a taxi to bring him to the ED. Does not recall additional details of events. Currently complaining of pain associated with swelling on is face, as well as pain on his left flank/abdomen.     INTERVAL EVENTS: Mentating at baseline in ED, admitted to thoracic surgery for correction of supratherapeutic INR and resuscitation for anemia. CT max-face and CT internal auditory canal revealed acute posterior displaced fracture of anterior wall of left external auditory canal, and right infraorbital soft tissue swelling.   9/8 < from: CT Maxillofacial No Cont (09.07.20 @ 18:41) >  CT IAC: Acute posteriorly displaced fracture of the anterior wall of the left external auditory canal. Partial opacification of the external auditory canal, likely with blood products.    CT maxillofacial: No acute maxillofacial bone fracture. Right facial/infraorbital soft tissue swelling.    < end of copied text >    Repeat head CT tonight  Pt with bleeding from L ear, ENT evaluating, packed x 2. No surgical intervention  INR up to 3.0, additional ffp x 1  9/9 L ear clot noted, no further active bleeding    CT : < from: CT Head No Cont (09.08.20 @ 17:53) >  No acute intracranial hemorrhage.  New right premaxillary soft tissue hematoma measuring 1.3 x 1.5 x 2.6 cm.    < end of copied text >  9/10   OOB to chair,   HCT  26   creat 1.18  on ald 25 qd  neg 500 cc  MAP  72,  rt facial edema

## 2020-09-10 NOTE — PROGRESS NOTE ADULT - PROBLEM SELECTOR PLAN 1
- s/p fall and questionable syncopal episode on 9/7  - CT of IAC shows acute posteriorly displaced fracture of the anterior wall of the left external auditory canal with partial opacification of the external auditory canal.  - ENT consulted, appreciate recommendations   - Left ear cauterized and packed with surgicel x 1, packing removed by ENT today  - Will require outpatient follow up

## 2020-09-10 NOTE — PROGRESS NOTE ADULT - PROBLEM SELECTOR PLAN 6
- complains of LUQ and LLQ discomfort. Mild tenderness noted on exam, likely related to fall  - KUB pending

## 2020-09-10 NOTE — PROGRESS NOTE ADULT - PROBLEM SELECTOR PLAN 1
Continue with otic floxin gtts x full 7 day course  INR goals discussed with CTsurge  Maintain BP Continue with otic floxin gtts x full 7 day course  INR goals discussed with CTsurge  Maintain BP  Patient should follow up in ENT office as an outpatient. May see Dr. Chris or Zeyad. Call [phone].

## 2020-09-10 NOTE — PROGRESS NOTE ADULT - SUBJECTIVE AND OBJECTIVE BOX
VITAL SIGNS    Telemetry:    sr   20-90    Vital Signs Last 24 Hrs  T(C): 37.2 (09-10-20 @ 07:05), Max: 37.3 (09-09-20 @ 21:24)  T(F): 98.9 (09-10-20 @ 07:05), Max: 99.2 (09-09-20 @ 21:24)  HR: 90 (09-10-20 @ 07:05) (80 - 95)  BP: --  RR: 18 (09-10-20 @ 07:05) (18 - 18)  SpO2: 96% (09-10-20 @ 07:05) (95% - 100%)                   Daily     Daily       Bilirubin Total, Serum: 0.4 mg/dL (09-10 @ 03:26)  Bilirubin Total, Serum: 0.3 mg/dL (09-09 @ 17:28)    CAPILLARY BLOOD GLUCOSE              Drains:     MS          Coumadin    [                    PHYSICAL EXAM  s"  I walked this am  no chest pain   no plpatations"  Neurology: alert and oriented x 3, moves all extremities with no defecits  CV : LVAD sounds  LVAD  CDI  Lungs:   CTA B/L  Abdomen: soft, nontender, nondistended, positive bowel sounds, last bowel movement   9/9  Extremities:     trace pedal edema

## 2020-09-10 NOTE — PROGRESS NOTE ADULT - SUBJECTIVE AND OBJECTIVE BOX
Subjective: Patient seen examined resting in bed. ON no issues.     Medications:  acetaminophen   Tablet .. 650 milliGRAM(s) Oral every 6 hours PRN  aMIOdarone    Tablet 200 milliGRAM(s) Oral daily  cyanocobalamin 1000 MICROGram(s) Oral daily  ferrous    sulfate 325 milliGRAM(s) Oral three times a day  finasteride 5 milliGRAM(s) Oral daily  folic acid 1 milliGRAM(s) Oral daily  influenza   Vaccine 0.5 milliLiter(s) IntraMuscular once  metoprolol succinate ER 25 milliGRAM(s) Oral daily  mirtazapine 7.5 milliGRAM(s) Oral at bedtime  ofloxacin 0.3% Solution 5 Drop(s) Left Ear two times a day  oxyCODONE    IR 2.5 milliGRAM(s) Oral four times a day PRN  pantoprazole    Tablet 40 milliGRAM(s) Oral every 12 hours  senna 2 Tablet(s) Oral at bedtime  spironolactone 25 milliGRAM(s) Oral daily    Vitals:  Vital Signs Last 24 Hours  T(C): 37.2 (09-10-20 @ 07:05), Max: 37.3 (09-09-20 @ 21:24)  HR: 90 (09-10-20 @ 07:05) (80 - 95)  BP: --  RR: 18 (09-10-20 @ 07:05) (18 - 18)  SpO2: 96% (09-10-20 @ 07:05) (95% - 100%)    Tele:    I&O's Summary    09 Sep 2020 07:01  -  10 Sep 2020 07:00  --------------------------------------------------------  IN: 800 mL / OUT: 1300 mL / NET: -500 mL      Physical Exam  General: No distress. Comfortable.  HEENT: Right facial swelling along with infra orbital ecchymosis. left ear noted to have packing in the canal    Neck: Neck supple. No JVP noted. No masses  Chest: Clear to auscultation bilaterally  CV: +VAD hum  Abdomen: Soft, non-distended, mild left sided tenderness without rebound or guarding  Skin: No rashes or skin breakdown  Neurology: Alert and oriented times three. Sensation intact    LVAD Interrogation  VAD TYPE HM 2  Speed 8800  Flow 4.7  Power 4.8  PI 6.8  Assessment of driveline exit site: driveline dressing c/d/i  Programming changes: no changes made  Alarms: noted to have one PI event that was associated with speed drop    Labs:                        8.3    7.87  )-----------( 175      ( 10 Sep 2020 03:26 )             26.7     09-10    130<L>  |  97  |  23  ----------------------------<  102<H>  4.2   |  21<L>  |  1.18    Ca    9.0      10 Sep 2020 03:26  Phos  3.5     09-08  Mg     2.3     09-09    TPro  6.7  /  Alb  3.6  /  TBili  0.4  /  DBili  x   /  AST  20  /  ALT  13  /  AlkPhos  58  09-10    PT/INR - ( 10 Sep 2020 03:26 )   PT: 23.6 sec;   INR: 2.05 ratio         PTT - ( 10 Sep 2020 03:26 )  PTT:33.3 sec      Lactate Dehydrogenase, Serum: 223 U/L (09-10 @ 03:26)  Lactate Dehydrogenase, Serum: 212 U/L (09-09 @ 05:16)  Lactate Dehydrogenase, Serum: 215 U/L (09-08 @ 23:10)  Lactate Dehydrogenase, Serum: 229 U/L (09-08 @ 03:00)  Lactate Dehydrogenase, Serum: 311 U/L (09-07 @ 17:02) Subjective: Patient seen examined resting in bed. ON noted to have 18 beats of wide complex.     Medications:  acetaminophen   Tablet .. 650 milliGRAM(s) Oral every 6 hours PRN  aMIOdarone    Tablet 200 milliGRAM(s) Oral daily  cyanocobalamin 1000 MICROGram(s) Oral daily  ferrous    sulfate 325 milliGRAM(s) Oral three times a day  finasteride 5 milliGRAM(s) Oral daily  folic acid 1 milliGRAM(s) Oral daily  influenza   Vaccine 0.5 milliLiter(s) IntraMuscular once  metoprolol succinate ER 25 milliGRAM(s) Oral daily  mirtazapine 7.5 milliGRAM(s) Oral at bedtime  ofloxacin 0.3% Solution 5 Drop(s) Left Ear two times a day  oxyCODONE    IR 2.5 milliGRAM(s) Oral four times a day PRN  pantoprazole    Tablet 40 milliGRAM(s) Oral every 12 hours  senna 2 Tablet(s) Oral at bedtime  spironolactone 25 milliGRAM(s) Oral daily    Vitals:  Vital Signs Last 24 Hours  T(C): 37.2 (09-10-20 @ 07:05), Max: 37.3 (09-09-20 @ 21:24)  HR: 90 (09-10-20 @ 07:05) (80 - 95)  BP: --  RR: 18 (09-10-20 @ 07:05) (18 - 18)  SpO2: 96% (09-10-20 @ 07:05) (95% - 100%)    Tele: SR 80-90 (overnight had 18 beats of wide complex)     I&O's Summary    09 Sep 2020 07:01  -  10 Sep 2020 07:00  --------------------------------------------------------  IN: 800 mL / OUT: 1300 mL / NET: -500 mL      Physical Exam  General: No distress. Comfortable.  HEENT: Right facial swelling along with infra orbital ecchymosis. left ear noted to have packing in the canal    Neck: Neck supple. No JVP noted. No masses  Chest: Clear to auscultation bilaterally  CV: +VAD hum  Abdomen: Soft, non-distended, mild left sided tenderness without rebound or guarding  Skin: No rashes or skin breakdown  Neurology: Alert and oriented times three. Sensation intact    LVAD Interrogation  VAD TYPE HM 2  Speed 8800  Flow 4.7  Power 4.8  PI 6.8  Assessment of driveline exit site: driveline dressing c/d/i  Programming changes: no changes made  Alarms: noted to have one PI event that was associated with speed drop    Labs:                        8.3    7.87  )-----------( 175      ( 10 Sep 2020 03:26 )             26.7     09-10    130<L>  |  97  |  23  ----------------------------<  102<H>  4.2   |  21<L>  |  1.18    Ca    9.0      10 Sep 2020 03:26  Phos  3.5     09-08  Mg     2.3     09-09    TPro  6.7  /  Alb  3.6  /  TBili  0.4  /  DBili  x   /  AST  20  /  ALT  13  /  AlkPhos  58  09-10    PT/INR - ( 10 Sep 2020 03:26 )   PT: 23.6 sec;   INR: 2.05 ratio         PTT - ( 10 Sep 2020 03:26 )  PTT:33.3 sec      Lactate Dehydrogenase, Serum: 223 U/L (09-10 @ 03:26)  Lactate Dehydrogenase, Serum: 212 U/L (09-09 @ 05:16)  Lactate Dehydrogenase, Serum: 215 U/L (09-08 @ 23:10)  Lactate Dehydrogenase, Serum: 229 U/L (09-08 @ 03:00)  Lactate Dehydrogenase, Serum: 311 U/L (09-07 @ 17:02) Subjective: Patient seen examined resting in bed. ON noted to have 18 beats of wide complex.     Medications:  acetaminophen   Tablet .. 650 milliGRAM(s) Oral every 6 hours PRN  aMIOdarone    Tablet 200 milliGRAM(s) Oral daily  cyanocobalamin 1000 MICROGram(s) Oral daily  ferrous    sulfate 325 milliGRAM(s) Oral three times a day  finasteride 5 milliGRAM(s) Oral daily  folic acid 1 milliGRAM(s) Oral daily  influenza   Vaccine 0.5 milliLiter(s) IntraMuscular once  metoprolol succinate ER 25 milliGRAM(s) Oral daily  mirtazapine 7.5 milliGRAM(s) Oral at bedtime  ofloxacin 0.3% Solution 5 Drop(s) Left Ear two times a day  oxyCODONE    IR 2.5 milliGRAM(s) Oral four times a day PRN  pantoprazole    Tablet 40 milliGRAM(s) Oral every 12 hours  senna 2 Tablet(s) Oral at bedtime  spironolactone 25 milliGRAM(s) Oral daily    Vitals:  Vital Signs Last 24 Hours  T(C): 37.2 (09-10-20 @ 07:05), Max: 37.3 (09-09-20 @ 21:24)  HR: 90 (09-10-20 @ 07:05) (80 - 95)  BP: --  RR: 18 (09-10-20 @ 07:05) (18 - 18)  SpO2: 96% (09-10-20 @ 07:05) (95% - 100%)    Tele: SR 80-90 (overnight had 18 beats of wide complex)     I&O's Summary    09 Sep 2020 07:01  -  10 Sep 2020 07:00  --------------------------------------------------------  IN: 800 mL / OUT: 1300 mL / NET: -500 mL      Physical Exam  General: No distress. Comfortable.  HEENT: Right facial swelling along with infra orbital ecchymosis  Neck: Neck supple. No JVP noted. No masses  Chest: Clear to auscultation bilaterally  CV: +VAD hum  Abdomen: Soft, non-distended, mild left sided tenderness without rebound or guarding  Skin: No rashes or skin breakdown  Neurology: Alert and oriented times three. Sensation intact    LVAD Interrogation  VAD TYPE HM 2  Speed 8800  Flow 4.7  Power 4.8  PI 6.8  Assessment of driveline exit site: driveline dressing c/d/i  Programming changes: no changes made  Alarms: noted to have one PI event that was associated with speed drop    Labs:                        8.3    7.87  )-----------( 175      ( 10 Sep 2020 03:26 )             26.7     09-10    130<L>  |  97  |  23  ----------------------------<  102<H>  4.2   |  21<L>  |  1.18    Ca    9.0      10 Sep 2020 03:26  Phos  3.5     09-08  Mg     2.3     09-09    TPro  6.7  /  Alb  3.6  /  TBili  0.4  /  DBili  x   /  AST  20  /  ALT  13  /  AlkPhos  58  09-10    PT/INR - ( 10 Sep 2020 03:26 )   PT: 23.6 sec;   INR: 2.05 ratio         PTT - ( 10 Sep 2020 03:26 )  PTT:33.3 sec      Lactate Dehydrogenase, Serum: 223 U/L (09-10 @ 03:26)  Lactate Dehydrogenase, Serum: 212 U/L (09-09 @ 05:16)  Lactate Dehydrogenase, Serum: 215 U/L (09-08 @ 23:10)  Lactate Dehydrogenase, Serum: 229 U/L (09-08 @ 03:00)  Lactate Dehydrogenase, Serum: 311 U/L (09-07 @ 17:02)

## 2020-09-10 NOTE — PROGRESS NOTE ADULT - PROBLEM SELECTOR PLAN 3
- Continue at current speed (8800)  - Re-admitted with supra-therapeutic INR of 5.14, s/p 2unts of FFP (last on 9/8).   - Continue to hold Coumadin and ASA for bleeding.    - Continue to monitor LDH levels daily. - Continue at current speed (8800)  - Re-admitted with supra-therapeutic INR of 5.14, s/p 2unts of FFP (last on 9/8).   - Will resume dosing Coumadin for anticoagulation (INR goal 2-2.5). Will dose 2 mg tonight  - Continue to hold ASA at this time, will likely be resumed within 1-2 days  - Continue to monitor LDH levels daily.

## 2020-09-10 NOTE — PROGRESS NOTE ADULT - PROBLEM SELECTOR PLAN 5
- Re-admitted with H/H 6.3/21.9 s/p 2uPRBC last on 9/8  - States that he had BRBPR at home on 9/7.   - Previously was admitted in July 2020 for GI bleed. Underwent EGD and Colonoscopy on 7/13 no active bleeding or source of bleeding was identified  - GI reconsulted, appreciate recommendations  - Recommended having iron studies completed, however holding off as patient was  transfused with 2uPRBC on 9/7  - Can consider repeat EGD/ colonoscopy if patient has rectal bleeding and declining hgb

## 2020-09-10 NOTE — PROGRESS NOTE ADULT - SUBJECTIVE AND OBJECTIVE BOX
Chief Complaint:  Patient is a 65y old  Male who presents with a chief complaint of Fall (10 Sep 2020 08:32)      Interval Events: No overt GI bleeding. No n/v. Mild vague lower abdominal discomfort, patient feels constipated    Allergies:  No Known Allergies      Hospital Medications:  acetaminophen   Tablet .. 650 milliGRAM(s) Oral every 6 hours PRN  aMIOdarone    Tablet 200 milliGRAM(s) Oral daily  cyanocobalamin 1000 MICROGram(s) Oral daily  ferrous    sulfate 325 milliGRAM(s) Oral three times a day  finasteride 5 milliGRAM(s) Oral daily  folic acid 1 milliGRAM(s) Oral daily  influenza   Vaccine 0.5 milliLiter(s) IntraMuscular once  metoprolol succinate ER 25 milliGRAM(s) Oral daily  mirtazapine 7.5 milliGRAM(s) Oral at bedtime  ofloxacin 0.3% Solution 5 Drop(s) Left Ear two times a day  oxyCODONE    IR 2.5 milliGRAM(s) Oral four times a day PRN  pantoprazole    Tablet 40 milliGRAM(s) Oral every 12 hours  senna 2 Tablet(s) Oral at bedtime  spironolactone 25 milliGRAM(s) Oral daily      PMHX/PSHX:  GI bleed  Vertigo  H/O epistaxis  Iron deficiency anemia  CKD (chronic kidney disease), stage III  Clavicle fracture  Falls  Anticoagulation goal of INR 2.0 to 2.5  ACC/AHA stage D systolic heart failure  BPH with urinary obstruction  Claudication  Peripheral arterial disease  Bleeding hemorrhoids  Hemorrhoids  History of 2019 novel coronavirus disease (COVID-19)  Pleural effusion  Depression  CAD (coronary artery disease)  CHF (congestive heart failure)  No pertinent past medical history  S/P endoscopy  S/P ventricular assist device  S/P TVR (tricuspid valve repair)  No significant past surgical history      Family history:  No pertinent family history in first degree relatives      ROS: As per HPI, 14-point ROS negative otherwise.    General:  No wt loss, fevers, chills, night sweats, fatigue,   Eyes:  Good vision, no reported pain  ENT:  No sore throat, pain, runny nose, dysphagia  CV:  No pain, palpitations, hypo/hypertension  Resp:  No dyspnea, cough, tachypnea, wheezing  GI:  See HPI  :  No pain, bleeding, incontinence, nocturia  Muscle:  No pain, weakness  Neuro:  No weakness, tingling, memory problems  Psych:  No fatigue, insomnia, mood problems, depression  Endocrine:  No polyuria, polydipsia, cold/heat intolerance  Heme:  No petechiae, ecchymosis, easy bruisability  Skin:  No rash, edema      PHYSICAL EXAM:     Vital Signs:  Vital Signs Last 24 Hrs  T(C): 37.2 (10 Sep 2020 07:05), Max: 37.3 (09 Sep 2020 21:24)  T(F): 98.9 (10 Sep 2020 07:05), Max: 99.2 (09 Sep 2020 21:24)  HR: 90 (10 Sep 2020 07:05) (80 - 95)  BP: --  BP(mean): 70 (10 Sep 2020 07:05) (60 - 76)  RR: 18 (10 Sep 2020 07:05) (18 - 18)  SpO2: 96% (10 Sep 2020 07:05) (95% - 100%)  Daily     Daily     GENERAL:  No acute distress  HEENT: NCAT  CHEST: Normal effort, no accessory muscle use  HEART: +LVAD hum  ABDOMEN:  Soft, non-tender, non-distended, normoactive bowel sounds, drive-line site c/d/i  RECTAL: No blood  EXTREMITIES:  No cyanosis, clubbing, or edema  SKIN:  No rash/erythema  NEURO:  nonfocal    LABS:                        8.3    7.87  )-----------( 175      ( 10 Sep 2020 03:26 )             26.7     09-10    130<L>  |  97  |  23  ----------------------------<  102<H>  4.2   |  21<L>  |  1.18    Ca    9.0      10 Sep 2020 03:26  Phos  3.5     09-08  Mg     2.3     09-09    TPro  6.7  /  Alb  3.6  /  TBili  0.4  /  DBili  x   /  AST  20  /  ALT  13  /  AlkPhos  58  09-10    LIVER FUNCTIONS - ( 10 Sep 2020 03:26 )  Alb: 3.6 g/dL / Pro: 6.7 g/dL / ALK PHOS: 58 U/L / ALT: 13 U/L / AST: 20 U/L / GGT: x           PT/INR - ( 10 Sep 2020 03:26 )   PT: 23.6 sec;   INR: 2.05 ratio         PTT - ( 10 Sep 2020 03:26 )  PTT:33.3 sec        Imaging:

## 2020-09-11 LAB
ALBUMIN SERPL ELPH-MCNC: 3.9 G/DL — SIGNIFICANT CHANGE UP (ref 3.3–5)
ALP SERPL-CCNC: 67 U/L — SIGNIFICANT CHANGE UP (ref 40–120)
ALT FLD-CCNC: 23 U/L — SIGNIFICANT CHANGE UP (ref 10–45)
AMYLASE P1 CFR SERPL: 63 U/L — SIGNIFICANT CHANGE UP (ref 25–125)
ANION GAP SERPL CALC-SCNC: 13 MMOL/L — SIGNIFICANT CHANGE UP (ref 5–17)
APTT BLD: 29.1 SEC — SIGNIFICANT CHANGE UP (ref 27.5–35.5)
AST SERPL-CCNC: 27 U/L — SIGNIFICANT CHANGE UP (ref 10–40)
BILIRUB SERPL-MCNC: 0.3 MG/DL — SIGNIFICANT CHANGE UP (ref 0.2–1.2)
BUN SERPL-MCNC: 20 MG/DL — SIGNIFICANT CHANGE UP (ref 7–23)
CALCIUM SERPL-MCNC: 9 MG/DL — SIGNIFICANT CHANGE UP (ref 8.4–10.5)
CHLORIDE SERPL-SCNC: 95 MMOL/L — LOW (ref 96–108)
CO2 SERPL-SCNC: 22 MMOL/L — SIGNIFICANT CHANGE UP (ref 22–31)
CREAT SERPL-MCNC: 1.16 MG/DL — SIGNIFICANT CHANGE UP (ref 0.5–1.3)
GLUCOSE SERPL-MCNC: 103 MG/DL — HIGH (ref 70–99)
HCT VFR BLD CALC: 27.6 % — LOW (ref 39–50)
HGB BLD-MCNC: 8.7 G/DL — LOW (ref 13–17)
INR BLD: 1.82 RATIO — HIGH (ref 0.88–1.16)
LDH SERPL L TO P-CCNC: 217 U/L — SIGNIFICANT CHANGE UP (ref 50–242)
LIDOCAIN IGE QN: 24 U/L — SIGNIFICANT CHANGE UP (ref 7–60)
MCHC RBC-ENTMCNC: 30.7 PG — SIGNIFICANT CHANGE UP (ref 27–34)
MCHC RBC-ENTMCNC: 31.5 GM/DL — LOW (ref 32–36)
MCV RBC AUTO: 97.5 FL — SIGNIFICANT CHANGE UP (ref 80–100)
NRBC # BLD: 0 /100 WBCS — SIGNIFICANT CHANGE UP (ref 0–0)
PLATELET # BLD AUTO: 190 K/UL — SIGNIFICANT CHANGE UP (ref 150–400)
POTASSIUM SERPL-MCNC: 4.5 MMOL/L — SIGNIFICANT CHANGE UP (ref 3.5–5.3)
POTASSIUM SERPL-SCNC: 4.5 MMOL/L — SIGNIFICANT CHANGE UP (ref 3.5–5.3)
PROT SERPL-MCNC: 7 G/DL — SIGNIFICANT CHANGE UP (ref 6–8.3)
PROTHROM AB SERPL-ACNC: 21 SEC — HIGH (ref 10.6–13.6)
RBC # BLD: 2.83 M/UL — LOW (ref 4.2–5.8)
RBC # FLD: 20.3 % — HIGH (ref 10.3–14.5)
SODIUM SERPL-SCNC: 130 MMOL/L — LOW (ref 135–145)
WBC # BLD: 8.93 K/UL — SIGNIFICANT CHANGE UP (ref 3.8–10.5)
WBC # FLD AUTO: 8.93 K/UL — SIGNIFICANT CHANGE UP (ref 3.8–10.5)

## 2020-09-11 PROCEDURE — 99233 SBSQ HOSP IP/OBS HIGH 50: CPT | Mod: 25

## 2020-09-11 PROCEDURE — 99232 SBSQ HOSP IP/OBS MODERATE 35: CPT

## 2020-09-11 PROCEDURE — 93750 INTERROGATION VAD IN PERSON: CPT

## 2020-09-11 RX ORDER — WARFARIN SODIUM 2.5 MG/1
2 TABLET ORAL ONCE
Refills: 0 | Status: COMPLETED | OUTPATIENT
Start: 2020-09-11 | End: 2020-09-11

## 2020-09-11 RX ORDER — SIMETHICONE 80 MG/1
80 TABLET, CHEWABLE ORAL THREE TIMES A DAY
Refills: 0 | Status: DISCONTINUED | OUTPATIENT
Start: 2020-09-11 | End: 2020-09-18

## 2020-09-11 RX ORDER — LIDOCAINE 4 G/100G
1 CREAM TOPICAL EVERY 24 HOURS
Refills: 0 | Status: DISCONTINUED | OUTPATIENT
Start: 2020-09-11 | End: 2020-09-18

## 2020-09-11 RX ADMIN — MIRTAZAPINE 7.5 MILLIGRAM(S): 45 TABLET, ORALLY DISINTEGRATING ORAL at 21:02

## 2020-09-11 RX ADMIN — PREGABALIN 1000 MICROGRAM(S): 225 CAPSULE ORAL at 11:15

## 2020-09-11 RX ADMIN — FINASTERIDE 5 MILLIGRAM(S): 5 TABLET, FILM COATED ORAL at 11:15

## 2020-09-11 RX ADMIN — PANTOPRAZOLE SODIUM 40 MILLIGRAM(S): 20 TABLET, DELAYED RELEASE ORAL at 05:38

## 2020-09-11 RX ADMIN — SENNA PLUS 2 TABLET(S): 8.6 TABLET ORAL at 21:02

## 2020-09-11 RX ADMIN — Medication 650 MILLIGRAM(S): at 15:30

## 2020-09-11 RX ADMIN — Medication 650 MILLIGRAM(S): at 14:51

## 2020-09-11 RX ADMIN — Medication 5 DROP(S): at 05:38

## 2020-09-11 RX ADMIN — LIDOCAINE 1 PATCH: 4 CREAM TOPICAL at 17:08

## 2020-09-11 RX ADMIN — PANTOPRAZOLE SODIUM 40 MILLIGRAM(S): 20 TABLET, DELAYED RELEASE ORAL at 17:08

## 2020-09-11 RX ADMIN — LISINOPRIL 2.5 MILLIGRAM(S): 2.5 TABLET ORAL at 05:38

## 2020-09-11 RX ADMIN — LIDOCAINE 1 PATCH: 4 CREAM TOPICAL at 18:55

## 2020-09-11 RX ADMIN — SPIRONOLACTONE 25 MILLIGRAM(S): 25 TABLET, FILM COATED ORAL at 05:38

## 2020-09-11 RX ADMIN — Medication 25 MILLIGRAM(S): at 05:38

## 2020-09-11 RX ADMIN — SIMETHICONE 80 MILLIGRAM(S): 80 TABLET, CHEWABLE ORAL at 13:27

## 2020-09-11 RX ADMIN — Medication 1 MILLIGRAM(S): at 11:15

## 2020-09-11 RX ADMIN — WARFARIN SODIUM 2 MILLIGRAM(S): 2.5 TABLET ORAL at 21:02

## 2020-09-11 RX ADMIN — POLYETHYLENE GLYCOL 3350 17 GRAM(S): 17 POWDER, FOR SOLUTION ORAL at 17:08

## 2020-09-11 RX ADMIN — Medication 325 MILLIGRAM(S): at 13:27

## 2020-09-11 RX ADMIN — SIMETHICONE 80 MILLIGRAM(S): 80 TABLET, CHEWABLE ORAL at 21:02

## 2020-09-11 RX ADMIN — OXYCODONE HYDROCHLORIDE 2.5 MILLIGRAM(S): 5 TABLET ORAL at 12:00

## 2020-09-11 RX ADMIN — Medication 325 MILLIGRAM(S): at 21:02

## 2020-09-11 RX ADMIN — AMIODARONE HYDROCHLORIDE 200 MILLIGRAM(S): 400 TABLET ORAL at 05:38

## 2020-09-11 RX ADMIN — OXYCODONE HYDROCHLORIDE 2.5 MILLIGRAM(S): 5 TABLET ORAL at 11:14

## 2020-09-11 RX ADMIN — Medication 325 MILLIGRAM(S): at 05:38

## 2020-09-11 RX ADMIN — Medication 5 DROP(S): at 17:22

## 2020-09-11 NOTE — DIETITIAN INITIAL EVALUATION ADULT. - ENERGY NEEDS
Ht: 5'10", current Wt: 157.4lbs, BMI: 22.5kg/m2, IBW: 166lbs(+/-10%), 94%IBW  Pertinent information: 65 year old male with PMHx of NICM, s.o  HM2 LVAD in 2017, on coumadin, CKD 3 presents s/p fall ? 2/2 syncope episode. Pt with left ear otorrhagia and anemia. GI following.   + face Edema, Skin: intact

## 2020-09-11 NOTE — PROGRESS NOTE ADULT - ASSESSMENT
65M PMH HF due to NICM HM2 LVAD (2017) on coumadin, TV annuloplasty ring, SIADH, CKD3, was readmitted after fall and stating he had BRBPR. Upon admission patient was noted to have H/H 6.3/21.9, was transfused with 2uPRBC (last on 9/8), with appropriate response. GI consulted and will likely require repeat EGD/ Colonoscopy. CT of IAC revealed left EAC fracture with hemorrhage, which required cauterization and packing. Due to recurrent falls and fractures at home within a 6 month period, patient is unsafe to be discharged home. Will need to be discharged to a [facility] facility to improve his strength.

## 2020-09-11 NOTE — DIETITIAN INITIAL EVALUATION ADULT. - CURRENT DIET ORDER MEETS ESTIMATED NUTRIENT REQUIREMENTS
12/4/2019         RE: Pat Dan  [address] [place] [address]        Dear Colleague,    Thank you for referring your patient, Pat Dan, to the [facility] [place]. Please see a copy of my visit note below.    Sports Medicine Clinic Visit    PCP: Raissa Kirby    Pat Dan is a 50 year old female who is seen as an AIC patient presenting with right wrist pain and swelling after she was cut by shard of glass on dorsal wrist ~ 4 weeks ago.    **  Patient denies any previous issues prior to laceration. 4 weeks ago, patient was standing in a corner, turned around which caused a pint glass to shatter. 1 piece of glass cut her dorsal wrist and then landed on the floor. Patient was bleeding profusely upon injury with some glass shards lined up on her ulnar hand. Patient washed, taped and wrapped injury. 4-6 hours later she washed with peroxide and applied bandage. Injury is tender to touch and swollen.     Currently she has severe pain at rest, worst pain since onset. Sharp throbbing pain located on right dorsal and ulnar side of wrist with shooting pains up to the right forearm, lateral and medial elbow; Pain has gradually worsened the past 3 days. Tightness noted last week. Due to a busy schedule, she doesn't notice pain till the evening after work. Positive for fingers being numb and tingly last night. No N/T currently.    Apart from pain making her nauseous occasionally, she doesn't report any other ill symptoms. Has been taking 600mg of Ibuprofen at a time for symptom relief.       Injury: Gradual onset of intense aching pain over the last one week.  Severe pain with swelling and weakness over the last one day.    Location of Pain: right ulnar sided wrist  Duration of Pain: 4 weeks, worse over last one week  Rating of Pain at worst: 9/10  Rating of Pain Currently: 8/10  Symptoms are better with: Rest  Symptoms are worse with: direct pressure, moving  wrist  Additional Features:   Positive: swelling and weakness  Other evaluation and/or treatments so far consists of: Ibuprofen, Rest and cleaned wound immediately following injury.  Prior History of related problems: none    Social History: works for a [employer]    Review of Systems  Musculoskeletal: as above  Remainder of review of systems is negative including constitutional, CV, pulmonary, GI, Skin and Neurologic except as noted in HPI or medical history.    Past Medical History:   Diagnosis Date     Retained placenta without hemorrhage, unspecified as to episode of care 2005    placenta acreta     S/P hysterectomy     retained placenta     Past Surgical History:   Procedure Laterality Date     SURGICAL HISTORY OF -   age 17    laparoscopy     SURGICAL HISTORY OF -   12/16/05    D & C, supracervical hysterectomy     Family History   Problem Relation Age of Onset     Genitourinary Problems Maternal Grandmother         kidney disease     Genetic Disorder Other         Cystic fibrosis     Breast Cancer Paternal Aunt 55     Breast Cancer Cousin 42     Social History     Socioeconomic History     Marital status:      Spouse name: Not on file     Number of children: Not on file     Years of education: Not on file     Highest education level: Not on file   Occupational History     Not on file   Social Needs     Financial resource strain: Not on file     Food insecurity:     Worry: Not on file     Inability: Not on file     Transportation needs:     Medical: Not on file     Non-medical: Not on file   Tobacco Use     Smoking status: Never Smoker     Smokeless tobacco: Never Used   Substance and Sexual Activity     Alcohol use: No     Drug use: No     Sexual activity: Not Currently     Partners: Male     Birth control/protection: Surgical     Comment: hyster.   Lifestyle     Physical activity:     Days per week: Not on file     Minutes per session: Not on file     Stress: Not on file   Relationships     Social  "connections:     Talks on phone: Not on file     Gets together: Not on file     Attends Rastafari service: Not on file     Active member of club or organization: Not on file     Attends meetings of clubs or organizations: Not on file     Relationship status: Not on file     Intimate partner violence:     Fear of current or ex partner: Not on file     Emotionally abused: Not on file     Physically abused: Not on file     Forced sexual activity: Not on file   Other Topics Concern     Parent/sibling w/ CABG, MI or angioplasty before 65F 55M? Not Asked   Social History Narrative     Not on file   This document serves as a record of the services and decisions personally performed and made by DO PAOLO Torres. It was created on his behalf by Payton Vidales, a trained medical scribe. The creation of this document is based the provider's statements to the medical scribe.    Scrruthie Vidales 3:52 PM 12/4/2019      Objective  /80   Temp 98.1  F (36.7  C)   Ht 1.664 m (5' 5.5\")   Wt 83.9 kg (185 lb)   BMI 30.32 kg/m       GENERAL APPEARANCE: healthy, alert and no distress   GAIT: NORMAL  SKIN: no suspicious lesions or rashes  NEURO: Normal strength and tone, mentation intact and speech normal  PSYCH:  mentation appears normal and affect normal/bright  HEENT: no scleral icterus  CV: distal perfusion intact  RESP: nonlabored breathing    Exam:  Hand/wrist (Right):    Inspection:       No deformity noted. Right hand dorsal wrist soft tissue swelling, fullness.        Laceration: <1cm, located Radial to ulnar styloid. No erythema. Wound is healing well, slightly pink, no opening or drainage. Ecchymosis noted on distal ulnar forearm, proximal to wrist, not at laceration site    Motion:       Limited Wrist flexion with pain; not as much compared to extension.       Moderately limited wrist extension with pain       Wrist radial deviation with pain       Wrist ulnar deviation limited with pain       No change in " pain with Digit motion at rest on pillow  Pain at dorsal ulnar aspect of the wrist with above ROM    Sensation:  Grossly intact .    Radial pulses normal, +2/4, capillary refill brisk.    Palpation:  Tender: diffuse. Dorsal forearm, Dorsal hand, ulnar hand/wrist  Non-tender: Elbow, volar forearm and remainder hand    Skin:       well perfused       capillary refill brisk    Regional pulses normal    Radiology:  Visualized radiographs of right wrist, and reviewed the images with the patient.  Impression: no fracture noted. No clear foreign body.    Recent Results (from the past 24 hour(s))   XR Wrist Right G/E 3 Views    Narrative    XR WRIST RT G/E 3 VW 12/4/2019 3:40 PM     HISTORY: Ulnar wrist pain, evaluate for bony injury vs foreign body;  Right wrist pain    COMPARISON: None.      Impression    IMPRESSION: No fracture or osseous lesion. Normal alignment. No  foreign body is evident.    MARY BRYANT MD         Assessment:  1. Right wrist pain    2. Wrist laceration, right, initial encounter       Plan:  Discussed the assessment with the patient. Suspect this is related to initial injury, with residual inflammation, and with increased pain and inflammation due to her work with repetitive motion. Differential diagnosis also includes infectious (unlikely, as afebrile, and duration of time since injury), retained foreign body, tendon injury.    Radiologic images reviewed and discussed with patient today.  We discussed the following: symptom treatment, activity modification/rest, imaging, medication, support for the affected area and lab studies. Following discussion, plan:  Ice routinely  Elevate prn  Activity modification discussed; Work restriction letter written.  Discussed consideration of lab studies, given symptoms; hold for now  Imaging: MRI ordered of the right wrist. Plan next, see assessment above.  Support: Discussed use of wrist brace; brace provided, some benefit in comfort with brace on.  Follow  up: Clinic will contact patient with MRI results, sooner as needed   Questions answered. The patient indicates understanding of these issues and agrees with the plan.    Kavin Holden DO, PAOLO        Patient Instructions   Routine Icing  Over the counter medication: Acetaminophen (Tylenol) 1000mg every 6 hours with food (Maximum of 3000mg/day) or/with   Ibuprofen (Advil) 600-800mg three times a day with food  Activity modification discussed; letter provided  Wrist brace for comfort/support  Elevate as needed  MRI right wrist next; will call with results     Advanced imaging is done by appointment. Some insurance require a prior authorization to be completed which may delay the time until you are able to schedule your appointment. You should be receiving a call from the scheduling department, if you have not heard from them in 24-48 hours.   Please call [facility]: [phone] to schedule your right wrist MRI.  Depending on your availability you can usually schedule within the next 1-2 days.    The clinic will call you with results, if you have not heard from the clinic within 3-4 days following your MRI please contact us at the number listed below.             Disclaimer: This note consists of symbols derived from keyboarding, dictation and/or voice recognition software. As a result, there may be errors in the script that have gone undetected. Please consider this when interpreting information found in this chart.    The information in this document, created by a scribe for me, accurately reflects the services I personally performed and the decisions made by me. I have reviewed and approved this document for accuracy.     Again, thank you for allowing me to participate in the care of your patient.        Sincerely,        Kavin Holden DO     Statement Selected

## 2020-09-11 NOTE — PROGRESS NOTE ADULT - PROBLEM SELECTOR PLAN 1
- s/p fall and questionable syncopal episode on 9/7  - CT of IAC shows acute posteriorly displaced fracture of the anterior wall of the left external auditory canal with partial opacification of the external auditory canal.  - ENT consulted, appreciate recommendations   - Left ear cauterized and packed with surgicel x 1, packing removed by ENT 9/10  - Will require outpatient follow up

## 2020-09-11 NOTE — PROGRESS NOTE ADULT - PROBLEM SELECTOR PLAN 6
- complains of LUQ and LLQ discomfort. Mild tenderness noted on exam, likely related to fall  - KUB 9/10 shows non-obstructive gas pattern  - LFT, Amylase, Lipase pending  - Started on Simethicone 80 mg TID for gas discomfort - complains of LUQ and LLQ discomfort. Mild tenderness noted on exam, likely related to fall  - KUB 9/10 shows non-obstructive gas pattern  - LFT, Amylase, Lipase all WNL  - Started on Simethicone 80 mg TID for gas discomfort  - Abdominal US pending

## 2020-09-11 NOTE — PROGRESS NOTE ADULT - ASSESSMENT
65M PMH HF due to NICM HM2 LVAD (2017) on coumadin, TV annuloplasty ring, SIADH, CKD3, presenting after fall. Patient reports he was reaching for ensure in the refrigerator and fell onto the floor. Patient does not remember falling , questionably sustained a syncopal event, reports being helped by his friend who he lives with to his bed, who then called a taxi to bring him to the ED. Does not recall additional details of events. Currently complaining of pain associated with swelling on is face, as well as pain on his left flank/abdomen.     INTERVAL EVENTS: Mentating at baseline in ED, admitted to thoracic surgery for correction of supratherapeutic INR and resuscitation for anemia. CT max-face and CT internal auditory canal revealed acute posterior displaced fracture of anterior wall of left external auditory canal, and right infraorbital soft tissue swelling.   9/8 < from: CT Maxillofacial No Cont (09.07.20 @ 18:41) >  CT IAC: Acute posteriorly displaced fracture of the anterior wall of the left external auditory canal. Partial opacification of the external auditory canal, likely with blood products.    CT maxillofacial: No acute maxillofacial bone fracture. Right facial/infraorbital soft tissue swelling.    < end of copied text >    Repeat head CT tonight  Pt with bleeding from L ear, ENT evaluating, packed x 2. No surgical intervention  INR up to 3.0, additional ffp x 1  9/9 L ear clot noted, no further active bleeding    CT : < from: CT Head No Cont (09.08.20 @ 17:53) >  No acute intracranial hemorrhage.  New right premaxillary soft tissue hematoma measuring 1.3 x 1.5 x 2.6 cm.    < end of copied text >  9/10   OOB to chair,   HCT  26   creat 1.18  on ald 25 qd  neg 500 cc  MAP  72,  rt facial edema   9/11   Facial edema improving.  Lisinopril resume yesterday, bp stable, would check orthostatics.  PT consulted , rec Home PT.  L ear packing has been removed

## 2020-09-11 NOTE — DIETITIAN INITIAL EVALUATION ADULT. - OTHER INFO
Pt seen for LOS, out of bed to chair.   Pt has very limited interest in RD interview at this time. Answering all questions with yes and no answers and being dismissive.     RD was able to briefly obtain...  Pt reports a good PO intake PTA. Eats 2 meals daily and drinks ensure enlvie 1-2x daily PTA. Prefers strawberry flavor.   Pt cannot recall UBW but denies any recent weight loss.     Pre LVAD in 2017, Pt was 145lbs. During hospitalizations ,he lost weight to 128lbs. Was last seen by this RD in July 2020 and was 168lbs. Pt is now 158lbs indicating a possible 10lbs weight loss however Pt denies this. ? fluids shifts. Of note PT was admitted at 180lbs, ? accuracy. Pt appears well nourished, he denies Nutrition focused physical exam at this time.     Pt denies to offer any food preferences. Noted ensure enlive at bedside x3, un touched. Pt turpin say he would prefer strawberry flavor.   Pt denies GI distress, chewing/swallowing difficulty, micronutrient supplements. NKFA

## 2020-09-11 NOTE — DIETITIAN INITIAL EVALUATION ADULT. - PERTINENT LABORATORY DATA
09-11 @ 05:56: Sodium 130<L>, Potassium 4.5, Chloride 95<L>, Calcium 9.0, Magnesium --, Phosphorus --, BUN 20, Creatinine 1.16, <H>, Alk Phos 67, ALT/SGPT 23, AST/SGOT 27, HbA1c --, Total Protein 7.0, Albumin 3.9, Prealbumin --, Total Bilirubin 0.3, Direct Bilirubin --, Hemoglobin 8.7<L>, Hematocrit 27.6<L>

## 2020-09-11 NOTE — DIETITIAN INITIAL EVALUATION ADULT. - ADD RECOMMEND
1. Continue current diet. 2. Continue Ensure Enlive x3; will change flavor to strawberry 3. encourage adequate PO intake 4. Provide meal time assistance 5. Trend GI tolerance.

## 2020-09-11 NOTE — PROGRESS NOTE ADULT - PROBLEM SELECTOR PLAN 3
- Continue at current speed (8800)  - Re-admitted with supra-therapeutic INR of 5.14, s/p 2unts of FFP (last on 9/8).   - Will resume dosing Coumadin for anticoagulation (INR goal 2-2.5). Will dose 2 mg tonight  - Continue to hold ASA at this time, will need to discuss possibly resuming in near future  - Continue to monitor LDH levels daily.

## 2020-09-11 NOTE — PROGRESS NOTE ADULT - SUBJECTIVE AND OBJECTIVE BOX
VITAL SIGNS    Telemetry:  nsr 80  Vital Signs Last 24 Hrs  T(C): 36.4 (20 @ 05:45), Max: 37.2 (09-10-20 @ 17:23)  T(F): 97.5 (20 @ 05:45), Max: 98.9 (09-10-20 @ 17:23)  HR: 92 (20 @ 05:45) (85 - 96)  BP: --68-74  RR: 18 (20 @ 05:45) (18 - 18)  SpO2: 98% (20 @ 05:45) (96% - 98%)                       8.7<L>                130<L>| 22   | 20           8.93  >-----------< 190     ------------------------< 103<H>                 27.6<L>                4.5  | 95<L>| 1.16                                                                      Ca 9.0   Mg x     Ph x        ,             8.3<L>                130<L>| 21<L>| 23           7.87  >-----------< 175     ------------------------< 102<H>                 26.7<L>                4.2  | 97   | 1.18                                                                      Ca 9.0   Mg x     Ph x                09-10-20 @ 07:01  -  20 @ 07:00  --------------------------------------------------------  IN: 520 mL / OUT: 1300 mL / NET: -780 mL     @ 05:56  PT21.0 INR1.82  PTT29.1  09-10 @ 03:26  PT23.6 INR2.05  PTT33.3   16:54  PT24.8 INR2.16  PTT--   @ 05:16  PT25.8 INR2.25  PTT34.2   @ 23:10  PT29.2 INR2.57  PTT37.2   @ 10:33  PT37.3 INR3.32  PTT--   @ 03:00  PT30.8 INR2.71  PTT38.5   @ 17:02  PT56.6 INR5.14  PTT40.4      Daily     Daily Weight in k.4 (11 Sep 2020 07:49)      CAPILLARY BLOOD GLUCOSE                    Coumadin    [ ] YES          [  ]      NO                                   PHYSICAL EXAM    Neurology: alert and oriented x 3, nonfocal, no gross deficits  L ear packing is out, no bleeding noted  CV : .S1S2 RRR, + hum  Sternal Wound :  CDI , Stable]  Lungs: bibasilar crackles   Drains:    Abdomen: soft, nontender, nondistended, positive bowel sounds driveline cdi  :         voiding    Extremities:     _trace_ edema, __neg _calf tenderness.            acetaminophen   Tablet .. 650 milliGRAM(s) Oral every 6 hours PRN  aMIOdarone    Tablet 200 milliGRAM(s) Oral daily  cyanocobalamin 1000 MICROGram(s) Oral daily  ferrous    sulfate 325 milliGRAM(s) Oral three times a day  finasteride 5 milliGRAM(s) Oral daily  folic acid 1 milliGRAM(s) Oral daily  influenza   Vaccine 0.5 milliLiter(s) IntraMuscular once  lisinopril 2.5 milliGRAM(s) Oral two times a day  metoprolol succinate ER 25 milliGRAM(s) Oral daily  mirtazapine 7.5 milliGRAM(s) Oral at bedtime  ofloxacin 0.3% Solution 5 Drop(s) Left Ear two times a day  oxyCODONE    IR 2.5 milliGRAM(s) Oral four times a day PRN  pantoprazole    Tablet 40 milliGRAM(s) Oral every 12 hours  polyethylene glycol 3350 17 Gram(s) Oral two times a day  senna 2 Tablet(s) Oral at bedtime  spironolactone 25 milliGRAM(s) Oral daily                    Physical Therapy Rec:   Home  [  ]   Home w/ PT  [  ]  Rehab  [  ]  Discussed with Cardiothoracic Team at AM rounds.

## 2020-09-11 NOTE — PROGRESS NOTE ADULT - SUBJECTIVE AND OBJECTIVE BOX
Subjective: Patient seen and examined resting in bed. ON no issues.     Medications:  acetaminophen   Tablet .. 650 milliGRAM(s) Oral every 6 hours PRN  aMIOdarone    Tablet 200 milliGRAM(s) Oral daily  cyanocobalamin 1000 MICROGram(s) Oral daily  ferrous    sulfate 325 milliGRAM(s) Oral three times a day  finasteride 5 milliGRAM(s) Oral daily  folic acid 1 milliGRAM(s) Oral daily  influenza   Vaccine 0.5 milliLiter(s) IntraMuscular once  lisinopril 2.5 milliGRAM(s) Oral two times a day  metoprolol succinate ER 25 milliGRAM(s) Oral daily  mirtazapine 7.5 milliGRAM(s) Oral at bedtime  ofloxacin 0.3% Solution 5 Drop(s) Left Ear two times a day  oxyCODONE    IR 2.5 milliGRAM(s) Oral four times a day PRN  pantoprazole    Tablet 40 milliGRAM(s) Oral every 12 hours  polyethylene glycol 3350 17 Gram(s) Oral two times a day  senna 2 Tablet(s) Oral at bedtime  simethicone 80 milliGRAM(s) Chew three times a day  spironolactone 25 milliGRAM(s) Oral daily  warfarin 2 milliGRAM(s) Oral once    Vitals:  Vital Signs Last 24 Hours  T(C): 36.7 (20 @ 08:23), Max: 37.2 (09-10-20 @ 17:23)  HR: 103 (20 @ 10:30) (85 - 103)  BP: --  RR: 16 (20 @ 08:23) (16 - 18)  SpO2: 99% (20 @ 08:23) (96% - 99%)    Tele: SR     Weight in k.4 ( @ 07:49)    I&O's Summary    10 Sep 2020 07:01  -  11 Sep 2020 07:00  --------------------------------------------------------  IN: 520 mL / OUT: 1300 mL / NET: -780 mL        Physical Exam  General: No distress. Comfortable.  HEENT: Right facial swelling along with infra orbital ecchymosis  Neck: Neck supple. No JVP noted. No masses  Chest: Clear to auscultation bilaterally  CV: +VAD hum  Abdomen: Soft, non-distended, mild left sided tenderness without rebound or guarding  Skin: No rashes or skin breakdown  Neurology: Alert and oriented times three. Sensation intact      LVAD Interrogation  VAD TYPE HM 2  Speed 8800  Flow 4.5  Power 4.8  PI  6.8  Assessment of driveline exit site: driveline exit site c/d/i  Programming changes: no changes made  Alarms: one PI event associated with speed drop     Labs:                        8.7    8.93  )-----------( 190      ( 11 Sep 2020 05:56 )             27.6         130<L>  |  95<L>  |  20  ----------------------------<  103<H>  4.5   |  22  |  1.16    Ca    9.0      11 Sep 2020 05:56  Mg     2.3         TPro  7.0  /  Alb  3.9  /  TBili  0.3  /  DBili  x   /  AST  27  /  ALT  23  /  AlkPhos  67  -    PT/INR - ( 11 Sep 2020 05:56 )   PT: 21.0 sec;   INR: 1.82 ratio         PTT - ( 11 Sep 2020 05:56 )  PTT:29.1 sec      Lactate Dehydrogenase, Serum: 217 U/L ( @ 05:56)  Lactate Dehydrogenase, Serum: 223 U/L (09-10 @ 03:26)  Lactate Dehydrogenase, Serum: 212 U/L ( @ 05:16)  Lactate Dehydrogenase, Serum: 215 U/L ( @ 23:10)      Imaging Studies     < from: Xray Kidney Ureter Bladder (09.10.20 @ 16:02) >  IMPRESSION:  Nonobstructive bowel gas pattern.  < end of copied text >

## 2020-09-11 NOTE — PROGRESS NOTE ADULT - PROBLEM SELECTOR PLAN 2
- Continue Lisinopril 2.5 mg BID   - Continue home Metoprolol succinate 25 mg daily  - Continue home Aldactone 25 mg daily.

## 2020-09-12 LAB
ALBUMIN SERPL ELPH-MCNC: 3.8 G/DL — SIGNIFICANT CHANGE UP (ref 3.3–5)
ALP SERPL-CCNC: 75 U/L — SIGNIFICANT CHANGE UP (ref 40–120)
ALT FLD-CCNC: 33 U/L — SIGNIFICANT CHANGE UP (ref 10–45)
ANION GAP SERPL CALC-SCNC: 13 MMOL/L — SIGNIFICANT CHANGE UP (ref 5–17)
APTT BLD: 34.7 SEC — SIGNIFICANT CHANGE UP (ref 27.5–35.5)
AST SERPL-CCNC: 35 U/L — SIGNIFICANT CHANGE UP (ref 10–40)
BILIRUB SERPL-MCNC: 0.2 MG/DL — SIGNIFICANT CHANGE UP (ref 0.2–1.2)
BUN SERPL-MCNC: 20 MG/DL — SIGNIFICANT CHANGE UP (ref 7–23)
CALCIUM SERPL-MCNC: 9.4 MG/DL — SIGNIFICANT CHANGE UP (ref 8.4–10.5)
CHLORIDE SERPL-SCNC: 97 MMOL/L — SIGNIFICANT CHANGE UP (ref 96–108)
CO2 SERPL-SCNC: 22 MMOL/L — SIGNIFICANT CHANGE UP (ref 22–31)
CREAT SERPL-MCNC: 1.13 MG/DL — SIGNIFICANT CHANGE UP (ref 0.5–1.3)
GLUCOSE SERPL-MCNC: 103 MG/DL — HIGH (ref 70–99)
HCT VFR BLD CALC: 29.9 % — LOW (ref 39–50)
HGB BLD-MCNC: 9.2 G/DL — LOW (ref 13–17)
INR BLD: 1.63 RATIO — HIGH (ref 0.88–1.16)
LDH SERPL L TO P-CCNC: 257 U/L — HIGH (ref 50–242)
MCHC RBC-ENTMCNC: 29.9 PG — SIGNIFICANT CHANGE UP (ref 27–34)
MCHC RBC-ENTMCNC: 30.8 GM/DL — LOW (ref 32–36)
MCV RBC AUTO: 97.1 FL — SIGNIFICANT CHANGE UP (ref 80–100)
NRBC # BLD: 0 /100 WBCS — SIGNIFICANT CHANGE UP (ref 0–0)
PLATELET # BLD AUTO: 207 K/UL — SIGNIFICANT CHANGE UP (ref 150–400)
POTASSIUM SERPL-MCNC: 4.6 MMOL/L — SIGNIFICANT CHANGE UP (ref 3.5–5.3)
POTASSIUM SERPL-SCNC: 4.6 MMOL/L — SIGNIFICANT CHANGE UP (ref 3.5–5.3)
PROT SERPL-MCNC: 7.3 G/DL — SIGNIFICANT CHANGE UP (ref 6–8.3)
PROTHROM AB SERPL-ACNC: 19 SEC — HIGH (ref 10.6–13.6)
RBC # BLD: 3.08 M/UL — LOW (ref 4.2–5.8)
RBC # FLD: 20 % — HIGH (ref 10.3–14.5)
SODIUM SERPL-SCNC: 132 MMOL/L — LOW (ref 135–145)
WBC # BLD: 5.67 K/UL — SIGNIFICANT CHANGE UP (ref 3.8–10.5)
WBC # FLD AUTO: 5.67 K/UL — SIGNIFICANT CHANGE UP (ref 3.8–10.5)

## 2020-09-12 PROCEDURE — 99231 SBSQ HOSP IP/OBS SF/LOW 25: CPT

## 2020-09-12 PROCEDURE — 71045 X-RAY EXAM CHEST 1 VIEW: CPT | Mod: 26

## 2020-09-12 RX ORDER — PANTOPRAZOLE SODIUM 20 MG/1
40 TABLET, DELAYED RELEASE ORAL DAILY
Refills: 0 | Status: DISCONTINUED | OUTPATIENT
Start: 2020-09-12 | End: 2020-09-18

## 2020-09-12 RX ORDER — WARFARIN SODIUM 2.5 MG/1
2 TABLET ORAL ONCE
Refills: 0 | Status: COMPLETED | OUTPATIENT
Start: 2020-09-12 | End: 2020-09-12

## 2020-09-12 RX ADMIN — MIRTAZAPINE 7.5 MILLIGRAM(S): 45 TABLET, ORALLY DISINTEGRATING ORAL at 21:02

## 2020-09-12 RX ADMIN — SPIRONOLACTONE 25 MILLIGRAM(S): 25 TABLET, FILM COATED ORAL at 05:20

## 2020-09-12 RX ADMIN — POLYETHYLENE GLYCOL 3350 17 GRAM(S): 17 POWDER, FOR SOLUTION ORAL at 17:01

## 2020-09-12 RX ADMIN — LISINOPRIL 2.5 MILLIGRAM(S): 2.5 TABLET ORAL at 17:01

## 2020-09-12 RX ADMIN — Medication 25 MILLIGRAM(S): at 05:20

## 2020-09-12 RX ADMIN — SENNA PLUS 2 TABLET(S): 8.6 TABLET ORAL at 21:02

## 2020-09-12 RX ADMIN — FINASTERIDE 5 MILLIGRAM(S): 5 TABLET, FILM COATED ORAL at 11:55

## 2020-09-12 RX ADMIN — PREGABALIN 1000 MICROGRAM(S): 225 CAPSULE ORAL at 11:56

## 2020-09-12 RX ADMIN — LIDOCAINE 1 PATCH: 4 CREAM TOPICAL at 05:20

## 2020-09-12 RX ADMIN — LIDOCAINE 1 PATCH: 4 CREAM TOPICAL at 18:49

## 2020-09-12 RX ADMIN — SIMETHICONE 80 MILLIGRAM(S): 80 TABLET, CHEWABLE ORAL at 13:00

## 2020-09-12 RX ADMIN — Medication 5 DROP(S): at 17:01

## 2020-09-12 RX ADMIN — WARFARIN SODIUM 2 MILLIGRAM(S): 2.5 TABLET ORAL at 21:03

## 2020-09-12 RX ADMIN — AMIODARONE HYDROCHLORIDE 200 MILLIGRAM(S): 400 TABLET ORAL at 05:20

## 2020-09-12 RX ADMIN — Medication 325 MILLIGRAM(S): at 21:03

## 2020-09-12 RX ADMIN — LISINOPRIL 2.5 MILLIGRAM(S): 2.5 TABLET ORAL at 05:20

## 2020-09-12 RX ADMIN — SIMETHICONE 80 MILLIGRAM(S): 80 TABLET, CHEWABLE ORAL at 21:02

## 2020-09-12 RX ADMIN — LIDOCAINE 1 PATCH: 4 CREAM TOPICAL at 17:01

## 2020-09-12 RX ADMIN — Medication 325 MILLIGRAM(S): at 13:00

## 2020-09-12 RX ADMIN — PANTOPRAZOLE SODIUM 40 MILLIGRAM(S): 20 TABLET, DELAYED RELEASE ORAL at 05:20

## 2020-09-12 RX ADMIN — POLYETHYLENE GLYCOL 3350 17 GRAM(S): 17 POWDER, FOR SOLUTION ORAL at 05:20

## 2020-09-12 RX ADMIN — Medication 5 DROP(S): at 05:20

## 2020-09-12 RX ADMIN — Medication 1 MILLIGRAM(S): at 11:56

## 2020-09-12 RX ADMIN — PANTOPRAZOLE SODIUM 40 MILLIGRAM(S): 20 TABLET, DELAYED RELEASE ORAL at 17:01

## 2020-09-12 RX ADMIN — Medication 325 MILLIGRAM(S): at 05:20

## 2020-09-12 RX ADMIN — SIMETHICONE 80 MILLIGRAM(S): 80 TABLET, CHEWABLE ORAL at 05:20

## 2020-09-12 NOTE — PROGRESS NOTE ADULT - SUBJECTIVE AND OBJECTIVE BOX
S:  feels better.  c/o left ear pain    Telemetry:  SR 81    Vital Signs Last 24 Hrs  T(C): 36.5 (20 @ 12:08), Max: 36.5 (20 @ 12:08)  T(F): 97.7 (20 @ 12:08), Max: 97.7 (20 @ 12:08)  HR: 84 (20 @ 12:08) (79 - 96)  BP: --  RR: 18 (20 @ 12:08) (16 - 18)  SpO2: 100% (20 @ 12:08) (99% - 100%)                    @ 07:01  -   @ 07:00  --------------------------------------------------------  IN: 927 mL / OUT: 950 mL / NET: -23 mL     @ 07:01  -   @ 12:12  --------------------------------------------------------  IN: 240 mL / OUT: 0 mL / NET: 240 mL          Daily     Daily Weight in k.6 (12 Sep 2020 10:03)              Drains:     MS         [  ] Drainage:                 L Pleural  [  ]  Drainage:                R Pleural  [  ]  Drainage:    Pacing Wires        [  ]   Settings:                                  Isolated  [  ]    Coumadin    [x] YES          [  ]      NO         Reason:     HM2                            9.2    5.67  )-----------( 207      ( 12 Sep 2020 05:37 )             29.9           132<L>  |  97  |  20  ----------------------------<  103<H>  4.6   |  22  |  1.13    Ca    9.4      12 Sep 2020 05:37    TPro  7.3  /  Alb  3.8  /  TBili  0.2  /  DBili  x   /  AST  35  /  ALT  33  /  AlkPhos  75  09-12      PT/INR - ( 12 Sep 2020 05:37 )   PT: 19.0 sec;   INR: 1.63 ratio         PTT - ( 12 Sep 2020 05:37 )  PTT:34.7 sec    PHYSICAL EXAM:    Neurology: alert and oriented x 3, nonfocal, no gross deficits    right eye conjuctival hemmorhage    CV :  + LVAD sounds     Sternal Wound :  well-healed    Lungs:  clear to ausc.  No w/r/r    Abdomen: soft, nontender, nondistended, positive bowel sounds, last bowel movement              Extremities: no edema              acetaminophen   Tablet .. 650 milliGRAM(s) Oral every 6 hours PRN  aMIOdarone    Tablet 200 milliGRAM(s) Oral daily  cyanocobalamin 1000 MICROGram(s) Oral daily  ferrous    sulfate 325 milliGRAM(s) Oral three times a day  finasteride 5 milliGRAM(s) Oral daily  folic acid 1 milliGRAM(s) Oral daily  influenza   Vaccine 0.5 milliLiter(s) IntraMuscular once  lidocaine   Patch 1 Patch Transdermal every 24 hours  lisinopril 2.5 milliGRAM(s) Oral two times a day  metoprolol succinate ER 25 milliGRAM(s) Oral daily  mirtazapine 7.5 milliGRAM(s) Oral at bedtime  ofloxacin 0.3% Solution 5 Drop(s) Left Ear two times a day  oxyCODONE    IR 2.5 milliGRAM(s) Oral four times a day PRN  pantoprazole    Tablet 40 milliGRAM(s) Oral every 12 hours  polyethylene glycol 3350 17 Gram(s) Oral two times a day  senna 2 Tablet(s) Oral at bedtime  simethicone 80 milliGRAM(s) Chew three times a day  spironolactone 25 milliGRAM(s) Oral daily  warfarin 2 milliGRAM(s) Oral once                              Physical Therapy Rec:   Home  [  ]   Home w/ PT  [  ]  Rehab  [ x ]    Discussed with Cardiothoracic Team at AM rounds.

## 2020-09-12 NOTE — PROGRESS NOTE ADULT - ASSESSMENT
65M PMH HF due to NICM HM2 LVAD (2017) on coumadin, TV annuloplasty ring, SIADH, CKD3, presenting after fall. Patient reports he was reaching for ensure in the refrigerator and fell onto the floor. Patient does not remember falling , questionably sustained a syncopal event, reports being helped by his friend who he lives with to his bed, who then called a taxi to bring him to the ED. Does not recall additional details of events. Currently complaining of pain associated with swelling on is face, as well as pain on his left flank/abdomen.     INTERVAL EVENTS: Mentating at baseline in ED, admitted to thoracic surgery for correction of supratherapeutic INR and resuscitation for anemia. CT max-face and CT internal auditory canal revealed acute posterior displaced fracture of anterior wall of left external auditory canal, and right infraorbital soft tissue swelling.   9/8 < from: CT Maxillofacial No Cont (09.07.20 @ 18:41) >  CT IAC: Acute posteriorly displaced fracture of the anterior wall of the left external auditory canal. Partial opacification of the external auditory canal, likely with blood products.    CT maxillofacial: No acute maxillofacial bone fracture. Right facial/infraorbital soft tissue swelling.      Repeat head CT tonight  Pt with bleeding from L ear, ENT evaluating, packed x 2. No surgical intervention  INR up to 3.0, additional ffp x 1  9/9 L ear clot noted, no further active bleeding    CT : < from: CT Head No Cont (09.08.20 @ 17:53) >  No acute intracranial hemorrhage.  New right premaxillary soft tissue hematoma measuring 1.3 x 1.5 x 2.6 cm.      9/10   OOB to chair,   HCT  26   creat 1.18  on ald 25 qd  neg 500 cc  MAP  72,  rt facial edema   9/11   Facial edema improving.  Lisinopril resume yesterday, bp stable, would check orthostatics.  PT consulted , [facility].  L ear packing has been removed  9/12 HD stable MAP 76  Creat 1.13.  PT recs now [facility].

## 2020-09-13 LAB
ANION GAP SERPL CALC-SCNC: 12 MMOL/L — SIGNIFICANT CHANGE UP (ref 5–17)
BUN SERPL-MCNC: 15 MG/DL — SIGNIFICANT CHANGE UP (ref 7–23)
CALCIUM SERPL-MCNC: 8.9 MG/DL — SIGNIFICANT CHANGE UP (ref 8.4–10.5)
CHLORIDE SERPL-SCNC: 96 MMOL/L — SIGNIFICANT CHANGE UP (ref 96–108)
CO2 SERPL-SCNC: 23 MMOL/L — SIGNIFICANT CHANGE UP (ref 22–31)
CREAT SERPL-MCNC: 1.15 MG/DL — SIGNIFICANT CHANGE UP (ref 0.5–1.3)
GLUCOSE SERPL-MCNC: 96 MG/DL — SIGNIFICANT CHANGE UP (ref 70–99)
HCT VFR BLD CALC: 29.3 % — LOW (ref 39–50)
HGB BLD-MCNC: 9 G/DL — LOW (ref 13–17)
INR BLD: 1.72 RATIO — HIGH (ref 0.88–1.16)
LDH SERPL L TO P-CCNC: 223 U/L — SIGNIFICANT CHANGE UP (ref 50–242)
MCHC RBC-ENTMCNC: 29.5 PG — SIGNIFICANT CHANGE UP (ref 27–34)
MCHC RBC-ENTMCNC: 30.7 GM/DL — LOW (ref 32–36)
MCV RBC AUTO: 96.1 FL — SIGNIFICANT CHANGE UP (ref 80–100)
NRBC # BLD: 0 /100 WBCS — SIGNIFICANT CHANGE UP (ref 0–0)
PLATELET # BLD AUTO: 236 K/UL — SIGNIFICANT CHANGE UP (ref 150–400)
POTASSIUM SERPL-MCNC: 4.6 MMOL/L — SIGNIFICANT CHANGE UP (ref 3.5–5.3)
POTASSIUM SERPL-SCNC: 4.6 MMOL/L — SIGNIFICANT CHANGE UP (ref 3.5–5.3)
PROTHROM AB SERPL-ACNC: 19.9 SEC — HIGH (ref 10.6–13.6)
RBC # BLD: 3.05 M/UL — LOW (ref 4.2–5.8)
RBC # FLD: 19.3 % — HIGH (ref 10.3–14.5)
SODIUM SERPL-SCNC: 131 MMOL/L — LOW (ref 135–145)
WBC # BLD: 7.23 K/UL — SIGNIFICANT CHANGE UP (ref 3.8–10.5)
WBC # FLD AUTO: 7.23 K/UL — SIGNIFICANT CHANGE UP (ref 3.8–10.5)

## 2020-09-13 PROCEDURE — 99233 SBSQ HOSP IP/OBS HIGH 50: CPT

## 2020-09-13 RX ORDER — WARFARIN SODIUM 2.5 MG/1
2.5 TABLET ORAL ONCE
Refills: 0 | Status: COMPLETED | OUTPATIENT
Start: 2020-09-13 | End: 2020-09-13

## 2020-09-13 RX ORDER — WARFARIN SODIUM 2.5 MG/1
2 TABLET ORAL ONCE
Refills: 0 | Status: DISCONTINUED | OUTPATIENT
Start: 2020-09-13 | End: 2020-09-13

## 2020-09-13 RX ADMIN — PANTOPRAZOLE SODIUM 40 MILLIGRAM(S): 20 TABLET, DELAYED RELEASE ORAL at 12:19

## 2020-09-13 RX ADMIN — LISINOPRIL 2.5 MILLIGRAM(S): 2.5 TABLET ORAL at 05:00

## 2020-09-13 RX ADMIN — SIMETHICONE 80 MILLIGRAM(S): 80 TABLET, CHEWABLE ORAL at 22:19

## 2020-09-13 RX ADMIN — Medication 5 DROP(S): at 17:09

## 2020-09-13 RX ADMIN — FINASTERIDE 5 MILLIGRAM(S): 5 TABLET, FILM COATED ORAL at 12:18

## 2020-09-13 RX ADMIN — Medication 1 MILLIGRAM(S): at 12:18

## 2020-09-13 RX ADMIN — Medication 325 MILLIGRAM(S): at 22:19

## 2020-09-13 RX ADMIN — Medication 650 MILLIGRAM(S): at 05:44

## 2020-09-13 RX ADMIN — WARFARIN SODIUM 2.5 MILLIGRAM(S): 2.5 TABLET ORAL at 22:20

## 2020-09-13 RX ADMIN — Medication 325 MILLIGRAM(S): at 12:18

## 2020-09-13 RX ADMIN — LISINOPRIL 2.5 MILLIGRAM(S): 2.5 TABLET ORAL at 17:09

## 2020-09-13 RX ADMIN — PREGABALIN 1000 MICROGRAM(S): 225 CAPSULE ORAL at 12:18

## 2020-09-13 RX ADMIN — LIDOCAINE 1 PATCH: 4 CREAM TOPICAL at 12:19

## 2020-09-13 RX ADMIN — Medication 5 DROP(S): at 05:02

## 2020-09-13 RX ADMIN — MIRTAZAPINE 7.5 MILLIGRAM(S): 45 TABLET, ORALLY DISINTEGRATING ORAL at 22:19

## 2020-09-13 RX ADMIN — Medication 325 MILLIGRAM(S): at 05:00

## 2020-09-13 RX ADMIN — SIMETHICONE 80 MILLIGRAM(S): 80 TABLET, CHEWABLE ORAL at 12:17

## 2020-09-13 RX ADMIN — AMIODARONE HYDROCHLORIDE 200 MILLIGRAM(S): 400 TABLET ORAL at 05:00

## 2020-09-13 RX ADMIN — Medication 25 MILLIGRAM(S): at 05:01

## 2020-09-13 RX ADMIN — SENNA PLUS 2 TABLET(S): 8.6 TABLET ORAL at 22:19

## 2020-09-13 RX ADMIN — SIMETHICONE 80 MILLIGRAM(S): 80 TABLET, CHEWABLE ORAL at 05:00

## 2020-09-13 RX ADMIN — LIDOCAINE 1 PATCH: 4 CREAM TOPICAL at 04:42

## 2020-09-13 RX ADMIN — LIDOCAINE 1 PATCH: 4 CREAM TOPICAL at 19:00

## 2020-09-13 RX ADMIN — SPIRONOLACTONE 25 MILLIGRAM(S): 25 TABLET, FILM COATED ORAL at 05:00

## 2020-09-13 NOTE — PROGRESS NOTE ADULT - ASSESSMENT
65M PMH HF due to NICM HM2 LVAD (2017) on coumadin, TV annuloplasty ring, SIADH, CKD3, presenting after fall. Patient reports he was reaching for ensure in the refrigerator and fell onto the floor. Patient does not remember falling , questionably sustained a syncopal event, reports being helped by his friend who he lives with to his bed, who then called a taxi to bring him to the ED. Does not recall additional details of events. Currently complaining of pain associated with swelling on is face, as well as pain on his left flank/abdomen.     INTERVAL EVENTS: Mentating at baseline in ED, admitted to thoracic surgery for correction of supratherapeutic INR and resuscitation for anemia. CT max-face and CT internal auditory canal revealed acute posterior displaced fracture of anterior wall of left external auditory canal, and right infraorbital soft tissue swelling.   9/8 < from: CT Maxillofacial No Cont (09.07.20 @ 18:41) >  CT IAC: Acute posteriorly displaced fracture of the anterior wall of the left external auditory canal. Partial opacification of the external auditory canal, likely with blood products.    CT maxillofacial: No acute maxillofacial bone fracture. Right facial/infraorbital soft tissue swelling.      Repeat head CT tonight  Pt with bleeding from L ear, ENT evaluating, packed x 2. No surgical intervention  INR up to 3.0, additional ffp x 1  9/9 L ear clot noted, no further active bleeding    CT : < from: CT Head No Cont (09.08.20 @ 17:53) >  No acute intracranial hemorrhage.  New right premaxillary soft tissue hematoma measuring 1.3 x 1.5 x 2.6 cm.      9/10   OOB to chair,   HCT  26   creat 1.18  on ald 25 qd  neg 500 cc  MAP  72,  rt facial edema   9/11   Facial edema improving.  Lisinopril resume yesterday, bp stable, would check orthostatics.  PT consulted , [facility].  L ear packing has been removed  9/12 HD stable MAP 76  Creat 1.13.  PT recs now [facility].  9/13 Coumadin 2mg for INR 1.72 change diet to mechanical soft diet

## 2020-09-13 NOTE — PROGRESS NOTE ADULT - SUBJECTIVE AND OBJECTIVE BOX
VITAL SIGNS    Telemetry:  Sr   Vital Signs Last 24 Hrs  T(C): 36.7 (20 @ 10:40), Max: 36.8 (20 @ 20:00)  T(F): 98.1 (20 @ 10:40), Max: 98.3 (20 @ 20:00)  HR: 82 (20 @ 10:40) (77 - 86)  BP: --  RR: 18 (20 @ 10:40) (18 - 18)  SpO2: 98% (20 @ 10:40) (98% - 100%)             @ 07:01  -   @ 07:00  --------------------------------------------------------  IN: 800 mL / OUT: 1800 mL / NET: -1000 mL     @ 07:01  -   @ 11:52  --------------------------------------------------------  IN: 240 mL / OUT: 0 mL / NET: 240 mL       Daily     Daily Weight in k.2 (13 Sep 2020 06:08)  Admit Wt: Drug Dosing Weight  Height (cm): 177.8 (07 Sep 2020 22:07)  Weight (kg): 82 (07 Sep 2020 22:07)  BMI (kg/m2): 25.9 (07 Sep 2020 22:07)  BSA (m2): 2 (07 Sep 2020 22:07)    LVAD Interrogation: HM2  Pump Flow: 3.6  Pump Speed: 8800 RPM  Pulse Index: 6.0  Pump Power: 4.3  VAD Events: No alarms.  Driveline evaluation:  No drainage or tenderness.               MEDICATIONS  acetaminophen   Tablet .. 650 milliGRAM(s) Oral every 6 hours PRN  aMIOdarone    Tablet 200 milliGRAM(s) Oral daily  cyanocobalamin 1000 MICROGram(s) Oral daily  ferrous    sulfate 325 milliGRAM(s) Oral three times a day  finasteride 5 milliGRAM(s) Oral daily  folic acid 1 milliGRAM(s) Oral daily  influenza   Vaccine 0.5 milliLiter(s) IntraMuscular once  lidocaine   Patch 1 Patch Transdermal every 24 hours  lisinopril 2.5 milliGRAM(s) Oral two times a day  metoprolol succinate ER 25 milliGRAM(s) Oral daily  mirtazapine 7.5 milliGRAM(s) Oral at bedtime  ofloxacin 0.3% Solution 5 Drop(s) Left Ear two times a day  oxyCODONE    IR 2.5 milliGRAM(s) Oral four times a day PRN  pantoprazole    Tablet 40 milliGRAM(s) Oral daily  polyethylene glycol 3350 17 Gram(s) Oral two times a day  senna 2 Tablet(s) Oral at bedtime  simethicone 80 milliGRAM(s) Chew three times a day  spironolactone 25 milliGRAM(s) Oral daily  warfarin 2 milliGRAM(s) Oral once      >>> <<<  PHYSICAL EXAM  Subjective: NAD oob to chair  Neurology: alert and oriented x 3, nonfocal, no gross deficits  CV : s1s2  Sternal Wound :  CDI , Stable  Lungs: cta  Abdomen: soft, NT,ND, (+ )BM  :  voiding  Extremities:   -c/c/e    LABS      131<L>  |  96  |  15  ----------------------------<  96  4.6   |  23  |  1.15    Ca    8.9      13 Sep 2020 05:33    TPro  7.3  /  Alb  3.8  /  TBili  0.2  /  DBili  x   /  AST  35  /  ALT  33  /  AlkPhos  75  -12                                 9.0    7.23  )-----------( 236      ( 13 Sep 2020 05:33 )             29.3          PT/INR - ( 13 Sep 2020 05:33 )   PT: 19.9 sec;   INR: 1.72 ratio         PTT - ( 12 Sep 2020 05:37 )  PTT:34.7 sec       PAST MEDICAL & SURGICAL HISTORY:  GI bleed    Vertigo    H/O epistaxis    Iron deficiency anemia    CKD (chronic kidney disease), stage III    Clavicle fracture    Falls    Anticoagulation goal of INR 2.0 to 2.5    ACC/AHA stage D systolic heart failure    BPH with urinary obstruction    Claudication    Peripheral arterial disease    Bleeding hemorrhoids    Hemorrhoids    History of 2019 novel coronavirus disease (COVID-19)  2020    Pleural effusion    Depression    CAD (coronary artery disease)    CHF (congestive heart failure)    S/P endoscopy    S/P ventricular assist device    S/P TVR (tricuspid valve repair)

## 2020-09-14 DIAGNOSIS — R55 SYNCOPE AND COLLAPSE: ICD-10-CM

## 2020-09-14 LAB
ALBUMIN SERPL ELPH-MCNC: 3.9 G/DL — SIGNIFICANT CHANGE UP (ref 3.3–5)
ALP SERPL-CCNC: 71 U/L — SIGNIFICANT CHANGE UP (ref 40–120)
ALT FLD-CCNC: 29 U/L — SIGNIFICANT CHANGE UP (ref 10–45)
ANION GAP SERPL CALC-SCNC: 14 MMOL/L — SIGNIFICANT CHANGE UP (ref 5–17)
APTT BLD: 29.6 SEC — SIGNIFICANT CHANGE UP (ref 27.5–35.5)
AST SERPL-CCNC: 22 U/L — SIGNIFICANT CHANGE UP (ref 10–40)
BILIRUB SERPL-MCNC: 0.2 MG/DL — SIGNIFICANT CHANGE UP (ref 0.2–1.2)
BUN SERPL-MCNC: 19 MG/DL — SIGNIFICANT CHANGE UP (ref 7–23)
CALCIUM SERPL-MCNC: 9.6 MG/DL — SIGNIFICANT CHANGE UP (ref 8.4–10.5)
CHLORIDE SERPL-SCNC: 95 MMOL/L — LOW (ref 96–108)
CO2 SERPL-SCNC: 22 MMOL/L — SIGNIFICANT CHANGE UP (ref 22–31)
CREAT SERPL-MCNC: 1.24 MG/DL — SIGNIFICANT CHANGE UP (ref 0.5–1.3)
GLUCOSE SERPL-MCNC: 95 MG/DL — SIGNIFICANT CHANGE UP (ref 70–99)
HCT VFR BLD CALC: 29.6 % — LOW (ref 39–50)
HGB BLD-MCNC: 9 G/DL — LOW (ref 13–17)
INR BLD: 1.42 RATIO — HIGH (ref 0.88–1.16)
LDH SERPL L TO P-CCNC: 219 U/L — SIGNIFICANT CHANGE UP (ref 50–242)
MCHC RBC-ENTMCNC: 29.6 PG — SIGNIFICANT CHANGE UP (ref 27–34)
MCHC RBC-ENTMCNC: 30.4 GM/DL — LOW (ref 32–36)
MCV RBC AUTO: 97.4 FL — SIGNIFICANT CHANGE UP (ref 80–100)
NRBC # BLD: 0 /100 WBCS — SIGNIFICANT CHANGE UP (ref 0–0)
PLATELET # BLD AUTO: 256 K/UL — SIGNIFICANT CHANGE UP (ref 150–400)
POTASSIUM SERPL-MCNC: 4.6 MMOL/L — SIGNIFICANT CHANGE UP (ref 3.5–5.3)
POTASSIUM SERPL-SCNC: 4.6 MMOL/L — SIGNIFICANT CHANGE UP (ref 3.5–5.3)
PROT SERPL-MCNC: 7.1 G/DL — SIGNIFICANT CHANGE UP (ref 6–8.3)
PROTHROM AB SERPL-ACNC: 16.6 SEC — HIGH (ref 10.6–13.6)
RBC # BLD: 3.04 M/UL — LOW (ref 4.2–5.8)
RBC # FLD: 18.8 % — HIGH (ref 10.3–14.5)
SODIUM SERPL-SCNC: 131 MMOL/L — LOW (ref 135–145)
WBC # BLD: 6.13 K/UL — SIGNIFICANT CHANGE UP (ref 3.8–10.5)
WBC # FLD AUTO: 6.13 K/UL — SIGNIFICANT CHANGE UP (ref 3.8–10.5)

## 2020-09-14 PROCEDURE — 93750 INTERROGATION VAD IN PERSON: CPT

## 2020-09-14 PROCEDURE — 99232 SBSQ HOSP IP/OBS MODERATE 35: CPT

## 2020-09-14 PROCEDURE — 76700 US EXAM ABDOM COMPLETE: CPT | Mod: 26

## 2020-09-14 PROCEDURE — 99233 SBSQ HOSP IP/OBS HIGH 50: CPT

## 2020-09-14 PROCEDURE — 99222 1ST HOSP IP/OBS MODERATE 55: CPT

## 2020-09-14 RX ORDER — WARFARIN SODIUM 2.5 MG/1
3.5 TABLET ORAL ONCE
Refills: 0 | Status: DISCONTINUED | OUTPATIENT
Start: 2020-09-14 | End: 2020-09-14

## 2020-09-14 RX ORDER — WARFARIN SODIUM 2.5 MG/1
3 TABLET ORAL ONCE
Refills: 0 | Status: COMPLETED | OUTPATIENT
Start: 2020-09-14 | End: 2020-09-14

## 2020-09-14 RX ADMIN — AMIODARONE HYDROCHLORIDE 200 MILLIGRAM(S): 400 TABLET ORAL at 05:32

## 2020-09-14 RX ADMIN — PREGABALIN 1000 MICROGRAM(S): 225 CAPSULE ORAL at 09:01

## 2020-09-14 RX ADMIN — Medication 325 MILLIGRAM(S): at 13:01

## 2020-09-14 RX ADMIN — LIDOCAINE 1 PATCH: 4 CREAM TOPICAL at 22:05

## 2020-09-14 RX ADMIN — SIMETHICONE 80 MILLIGRAM(S): 80 TABLET, CHEWABLE ORAL at 05:32

## 2020-09-14 RX ADMIN — SIMETHICONE 80 MILLIGRAM(S): 80 TABLET, CHEWABLE ORAL at 22:05

## 2020-09-14 RX ADMIN — Medication 5 DROP(S): at 05:36

## 2020-09-14 RX ADMIN — Medication 5 DROP(S): at 18:00

## 2020-09-14 RX ADMIN — LIDOCAINE 1 PATCH: 4 CREAM TOPICAL at 00:01

## 2020-09-14 RX ADMIN — PANTOPRAZOLE SODIUM 40 MILLIGRAM(S): 20 TABLET, DELAYED RELEASE ORAL at 09:01

## 2020-09-14 RX ADMIN — MIRTAZAPINE 7.5 MILLIGRAM(S): 45 TABLET, ORALLY DISINTEGRATING ORAL at 22:05

## 2020-09-14 RX ADMIN — WARFARIN SODIUM 3 MILLIGRAM(S): 2.5 TABLET ORAL at 22:05

## 2020-09-14 RX ADMIN — Medication 25 MILLIGRAM(S): at 05:36

## 2020-09-14 RX ADMIN — FINASTERIDE 5 MILLIGRAM(S): 5 TABLET, FILM COATED ORAL at 09:01

## 2020-09-14 RX ADMIN — SIMETHICONE 80 MILLIGRAM(S): 80 TABLET, CHEWABLE ORAL at 13:01

## 2020-09-14 RX ADMIN — Medication 325 MILLIGRAM(S): at 05:32

## 2020-09-14 RX ADMIN — SENNA PLUS 2 TABLET(S): 8.6 TABLET ORAL at 22:05

## 2020-09-14 RX ADMIN — LISINOPRIL 2.5 MILLIGRAM(S): 2.5 TABLET ORAL at 17:59

## 2020-09-14 RX ADMIN — Medication 325 MILLIGRAM(S): at 22:02

## 2020-09-14 RX ADMIN — SPIRONOLACTONE 25 MILLIGRAM(S): 25 TABLET, FILM COATED ORAL at 05:32

## 2020-09-14 RX ADMIN — LISINOPRIL 2.5 MILLIGRAM(S): 2.5 TABLET ORAL at 05:36

## 2020-09-14 RX ADMIN — Medication 1 MILLIGRAM(S): at 09:01

## 2020-09-14 NOTE — PROGRESS NOTE ADULT - PROBLEM SELECTOR PLAN 3
- Continue at current speed (8800)  - Re-admitted with supra-therapeutic INR of 5.14, s/p 2unts of FFP (last on 9/8).   - Continue dosing Coumadin for anticoagulation (INR goal 2-2.5). Will dose 2.5 mg tonight  - Continue to hold ASA at this time, will need to discuss possibly resuming in near future  - Continue to monitor LDH levels daily. - Continue at current speed (8800)  - Re-admitted with supra-therapeutic INR of 5.14, s/p 2unts of FFP (last on 9/8).   - Continue dosing Coumadin for anticoagulation (INR goal 2-2.5). Will dose 3 mg tonight  - Continue to hold ASA for recurrent bleeds   - Continue to monitor LDH levels daily.

## 2020-09-14 NOTE — CONSULT NOTE ADULT - ATTENDING COMMENTS
I saw and examined the patient and reviewed all lab and imaging results with Dr. Fierro. The patient's first episode of syncope had a prodrome and was in the setting of severe anemia which may have resulted in significant LV underfilling and drop in LVAD output. The second episode though associated with anemia (but not as severe) had no prodrome at all and associated with significant trauma. He has had some rapid NSVT here. Given continuous flow LVAD, likelihood of syncope would require several concurrent factors such as anemia, arrhythmia, high flow (etc). I don't think an ILR is unreasonable as long as we can obtain an adequate signal without LVAD interference. I will investigate the options. The only other option would be to do an EP study with possible induction of arrhythmias with monitoring of LVAD output and flow.
Patient seen and examined. Agree with above. Patient has no clinical bleeding now. Admitted with supertherapeutic INR after fall, and had bleeding in the ear. As h/H is stable without active GI bleeding and he had recent negative endoscopic workup, would not pursue repeat at this time. Would repeat iron studies.
pt seen and examined. see additional progress note from today. agree with above.

## 2020-09-14 NOTE — PROGRESS NOTE ADULT - SUBJECTIVE AND OBJECTIVE BOX
Subjective: Patient seen and examined resting in bed. ON no issues.     Medications:  acetaminophen   Tablet .. 650 milliGRAM(s) Oral every 6 hours PRN  aMIOdarone    Tablet 200 milliGRAM(s) Oral daily  cyanocobalamin 1000 MICROGram(s) Oral daily  ferrous    sulfate 325 milliGRAM(s) Oral three times a day  finasteride 5 milliGRAM(s) Oral daily  folic acid 1 milliGRAM(s) Oral daily  influenza   Vaccine 0.5 milliLiter(s) IntraMuscular once  lidocaine   Patch 1 Patch Transdermal every 24 hours  lisinopril 2.5 milliGRAM(s) Oral two times a day  metoprolol succinate ER 25 milliGRAM(s) Oral daily  mirtazapine 7.5 milliGRAM(s) Oral at bedtime  ofloxacin 0.3% Solution 5 Drop(s) Left Ear two times a day  oxyCODONE    IR 2.5 milliGRAM(s) Oral four times a day PRN  pantoprazole    Tablet 40 milliGRAM(s) Oral daily  polyethylene glycol 3350 17 Gram(s) Oral two times a day  senna 2 Tablet(s) Oral at bedtime  simethicone 80 milliGRAM(s) Chew three times a day  spironolactone 25 milliGRAM(s) Oral daily      Vitals:  Vital Signs Last 24 Hours  T(C): 36.6 (09-14-20 @ 05:30), Max: 36.7 (09-13-20 @ 10:40)  HR: 102 (09-14-20 @ 05:30) (76 - 102)  BP: 87/63 (09-14-20 @ 05:30) (87/63 - 87/63)  RR: 18 (09-14-20 @ 05:30) (18 - 18)  SpO2: 96% (09-14-20 @ 05:30) (96% - 98%)    Tele: SR/ST     I&O's Summary    13 Sep 2020 07:01  -  14 Sep 2020 07:00  --------------------------------------------------------  IN: 640 mL / OUT: 660 mL / NET: -20 mL    14 Sep 2020 07:01  -  14 Sep 2020 09:25  --------------------------------------------------------  IN: 240 mL / OUT: 400 mL / NET: -160 mL        Physical Exam   General: No distress. Comfortable.  HEENT: Right facial swelling along with infra orbital ecchymosis  Neck: Neck supple. No JVP noted. No masses  Chest: Clear to auscultation bilaterally  CV: +VAD hum  Abdomen: Soft, non-distended, mild left sided tenderness without rebound or guarding  Skin: No rashes or skin breakdown  Neurology: Alert and oriented times three. Sensation intact    LVAD Interrogation  VAD TYPE HM 2  Speed 8800  Flow 4.6  Power 4.8  PI 6.8  Assessment of driveline exit site: driveline dressing c/d/i  Programming changes: no changes made    Labs:                        9.0    6.13  )-----------( 256      ( 14 Sep 2020 06:19 )             29.6     09-14    131<L>  |  95<L>  |  19  ----------------------------<  95  4.6   |  22  |  1.24    Ca    9.6      14 Sep 2020 06:19    TPro  7.1  /  Alb  3.9  /  TBili  0.2  /  DBili  x   /  AST  22  /  ALT  29  /  AlkPhos  71  09-14    PT/INR - ( 14 Sep 2020 06:19 )   PT: 16.6 sec;   INR: 1.42 ratio         PTT - ( 14 Sep 2020 06:19 )  PTT:29.6 sec      Lactate Dehydrogenase, Serum: 219 U/L (09-14 @ 06:19)  Lactate Dehydrogenase, Serum: 223 U/L (09-13 @ 05:33)  Lactate Dehydrogenase, Serum: 257 U/L (09-12 @ 05:37)      Imaging Studies     < from: Xray Chest 1 View- PORTABLE-Routine (Xray Chest 1 View- PORTABLE-Routine in AM.) (09.12.20 @ 09:42) >  IMPRESSION:  Clear lungs.  < end of copied text >

## 2020-09-14 NOTE — CONSULT NOTE ADULT - SUBJECTIVE AND OBJECTIVE BOX
Patient seen and evaluated at bedside    Chief Complaint: Possible syncope    HPI:  66 yo M with PMH of AHA stage D HF 2/2 NICM s/p LVAD 2017 on Coumadin, paroxysmal a fib on amiodarone, history of TV annuloplasty, anemia 2/2 recurrent GI bleed, SIADH and CKD3 who presented with recurrent fall in setting of possible syncope. EP consulted for evaluation of ILR.  Per outpatient notes, patient was admitted with similar fall 4/2020, thought to be in setting of iatrogenic hypotension, discharged after adjusting medications. Per patient, was feeling strange all day with headaches and GI symptoms prior to losing consciousness. Denies chest pain or palpitations before or after event. Patient was then admitted for symptomatic anemia with BRBPR requiring pRBC 7/2020.  Per patient, most recent admission with syncope occurred morning of 9/7/20, when he got up to go to the refrigerator for an Ensure. Was on an empty stomach prior, denied any prodrome or symptoms after event. Fall caused acute fracture of left external auditory canal along with right infraorbital soft tissue swelling. Noted with Hgb 6.3 on admission, improved s/p 2u pRBC. Has continued to have BRBPR at home, maintained on coumadin with supratherapeutic INR on admission. Panendoscopy this admission negative for bleeding.  Denies history of prior syncope, no family history of cardiac arrythmia no one with PPM or ICD. BPs have been 80/60s with MAP 60-70, no orthostatics checked.    PMHx:   GI bleed    Vertigo    H/O epistaxis    Iron deficiency anemia    CKD (chronic kidney disease), stage III    Clavicle fracture    Falls    Anticoagulation goal of INR 2.0 to 2.5    ACC/AHA stage D systolic heart failure    BPH with urinary obstruction    Claudication    Peripheral arterial disease    Bleeding hemorrhoids    Hemorrhoids    History of 2019 novel coronavirus disease (COVID-19)    Pleural effusion    Depression    CAD (coronary artery disease)    CHF (congestive heart failure)    No pertinent past medical history    PSHx:   S/P endoscopy    S/P ventricular assist device    S/P TVR (tricuspid valve repair)    No significant past surgical history    Allergies:  No Known Allergies    Home Meds:  Acetaminophen 325 MG Oral Tablet; TAKE 1 TO 2 TABLETS EVERY 4 HOURS AS  NEEDED  Amiodarone HCl - 200 MG Oral Tablet; TAKE 1 TABLET DAILY  Aspirin Low Dose 81 MG Oral Tablet Delayed Release; TAKE 1 TABLET BY MOUTH  EVERY DAY  Finasteride 5 MG Oral Tablet; TAKE 1 TABLET BY MOUTH EVERY DAY  Lisinopril 5 MG Oral Tablet; take 1 tablet at bedtime  Metoprolol Succinate ER 25 MG Oral Tablet Extended Release 24 Hour; TAKE 1 TABLET  DAILY  Mirtazapine 7.5 MG Oral Tablet; TAKE 1 TABLET BY MOUTH EVERYDAY AT BEDTIME  Pantoprazole Sodium 40 MG Oral Tablet Delayed Release; TAKE 1 TABLET DAILY  Spironolactone 25 MG Oral Tablet; TAKE 1 TABLET DAILY  Warfarin Sodium 5 MG Oral Tablet  Warfarin Sodium 6 MG Oral Tablet; TAKE 1 TABLET DAILY    Current Medications:   acetaminophen   Tablet .. 650 milliGRAM(s) Oral every 6 hours PRN  aMIOdarone    Tablet 200 milliGRAM(s) Oral daily  cyanocobalamin 1000 MICROGram(s) Oral daily  ferrous    sulfate 325 milliGRAM(s) Oral three times a day  finasteride 5 milliGRAM(s) Oral daily  folic acid 1 milliGRAM(s) Oral daily  influenza   Vaccine 0.5 milliLiter(s) IntraMuscular once  lidocaine   Patch 1 Patch Transdermal every 24 hours  lisinopril 2.5 milliGRAM(s) Oral two times a day  metoprolol succinate ER 25 milliGRAM(s) Oral daily  mirtazapine 7.5 milliGRAM(s) Oral at bedtime  ofloxacin 0.3% Solution 5 Drop(s) Left Ear two times a day  oxyCODONE    IR 2.5 milliGRAM(s) Oral four times a day PRN  pantoprazole    Tablet 40 milliGRAM(s) Oral daily  polyethylene glycol 3350 17 Gram(s) Oral two times a day  senna 2 Tablet(s) Oral at bedtime  simethicone 80 milliGRAM(s) Chew three times a day  spironolactone 25 milliGRAM(s) Oral daily  warfarin 3 milliGRAM(s) Oral once    FAMILY HISTORY:  No pertinent family history in first degree relatives    Social History:  Smoking History: Former  Alcohol Use: Former    REVIEW OF SYSTEMS:  CONSTITUTIONAL: No weakness, fevers or chills  EYES/ENT: No visual changes;  No dysphagia  NECK: No pain or stiffness  RESPIRATORY: No cough, wheezing, hemoptysis; No shortness of breath  CARDIOVASCULAR: No chest pain or palpitations; No lower extremity edema  GASTROINTESTINAL: No abdominal or epigastric pain. No nausea, vomiting, or hematemesis; No diarrhea or constipation. No melena or hematochezia.  BACK: No back pain  GENITOURINARY: No dysuria, frequency or hematuria  NEUROLOGICAL: No numbness or weakness  SKIN: No itching, burning, rashes, or lesions   All other review of systems is negative unless indicated above.    Physical Exam:  T(F): 97.8 (09-14), Max: 97.8 (09-14)  HR: 102 (09-14) (76 - 102)  BP: 87/61 (09-14) (87/61 - 87/63)  RR: 18 (09-14)  SpO2: 96% (09-14)  GENERAL: No acute distress, well-developed  HEENT: Atraumatic, EOMI, conjunctival hemorrhage of R eye with periorbital edema, No JVD, moist mucosa  CHEST/LUNG: Clear to auscultation bilaterally; No wheeze, equal breath sounds bilaterally  HEART: LVAD hum, pulses appreciated  ABDOMEN: Soft  EXTREMITIES:  No clubbing, cyanosis, or edema  PSYCH: Nl behavior, nl affect  NEUROLOGY: AAOx3, non-focal, cranial nerves intact  SKIN: Normal color    Cardiovascular Diagnostic Testing:    ECG: Sinus rhythm, septal R waves, LAFB  Telemetry: Sinus rhythm    Echo:  < from: Transthoracic Echocardiogram (07.09.20 @ 16:40) >  Conclusions:  Heartmate II at  8800 RPM.  LVAD cannula noted at the apex. With LVAD support, the left  ventricle appears decompressed.  The aortic valve opens on all observed beats. Mild aortic  regurgitation.  An annuloplasty ring is seen in the tricuspid position.  Mild tricuspid regurgitation.  < end of copied text >    Cath:  < from: Cardiac Cath Lab - Adult (08.31.17 @ 15:17) >  CORONARY VESSELS: The coronary circulation is right dominant.  LM:   --  LM: Normal.  LAD:   --  LAD: Normal.  CX:  --  Circumflex: Normal.  RCA:   --  RCA: Normal.  COMPLICATIONS: There were no complications.    < end of copied text >    Imaging:  CXR:  < from: Xray Chest 1 View- PORTABLE-Routine (Xray Chest 1 View- PORTABLE-Routine in AM.) (09.12.20 @ 09:42) >  FINDINGS:    Cardiac silhouette normal in size. Sternotomy and valve repair. LVAD. Clear lungs. No pleural effusion or pneumothorax.    IMPRESSION:  Clear lungs.  < end of copied text >    Labs: Personally reviewed                        9.0    6.13  )-----------( 256      ( 14 Sep 2020 06:19 )             29.6     09-14    131<L>  |  95<L>  |  19  ----------------------------<  95  4.6   |  22  |  1.24    Ca    9.6      14 Sep 2020 06:19    TPro  7.1  /  Alb  3.9  /  TBili  0.2  /  DBili  x   /  AST  22  /  ALT  29  /  AlkPhos  71  09-14    PT/INR - ( 14 Sep 2020 06:19 )   PT: 16.6 sec;   INR: 1.42 ratio         PTT - ( 14 Sep 2020 06:19 )  PTT:29.6 sec    CARDIAC MARKERS ( 07 Sep 2020 17:02 )  15 ng/L / x     / x     / x     / x     / x

## 2020-09-14 NOTE — PROGRESS NOTE ADULT - PROBLEM SELECTOR PLAN 3
Re-admitted with H/H 6.3/21.9 s/p 2uPRBC last on 9/8  States that he had BRBPR at home on 9/7  No GI intervention/scope needed at this time  HCT 29.6 today  If Hct trends down or further GI bleeding, plan to re-scope

## 2020-09-14 NOTE — PROGRESS NOTE ADULT - PROBLEM SELECTOR PLAN 6
- complains of LUQ and LLQ discomfort. Mild tenderness noted on exam, likely related to fall  - KUB 9/10 shows non-obstructive gas pattern  - LFT, Amylase, Lipase all WNL  - Continue Simethicone 80 mg TID for gas discomfort  - Abdominal US pending - complains of LUQ and LLQ discomfort. Mild tenderness noted on exam, likely related to fall  - KUB 9/10 shows non-obstructive gas pattern  - LFT, Amylase, Lipase all WNL  - Continue Simethicone 80 mg TID for gas discomfort  - Abdominal US completed 9/14; normal

## 2020-09-14 NOTE — PROGRESS NOTE ADULT - PROBLEM SELECTOR PLAN 7
- hx of syncope without known cause. has required multiple hospitalizations in the setting of low hemoglobin and elevated INR.   - Given patients advanced heart failure and substrate for VT, EP consulted for possible ILR implant. Awaiting further recommendations

## 2020-09-14 NOTE — PROGRESS NOTE ADULT - ASSESSMENT
65M PMH HF due to NICM HM2 LVAD (2017) on coumadin, TV annuloplasty ring, SIADH, CKD3, was readmitted after fall and stating he had BRBPR. Upon admission patient was noted to have H/H 6.3/21.9, was transfused with 2uPRBC (last on 9/8), with appropriate response. GI consulted and will likely require repeat EGD/ Colonoscopy. CT of IAC revealed left EAC fracture with hemorrhage, which required cauterization and packing. Due to recurrent falls and fractures at home within a 6 month period, patient is unsafe to be discharged home. Will need to be discharged to a [facility] facility to improve his strength. 65M PMH HF due to NICM HM2 LVAD (2017) on coumadin, TV annuloplasty ring, SIADH, CKD3, was readmitted after fall and stating he had BRBPR. Upon admission patient was noted to have H/H 6.3/21.9, was transfused with 2uPRBC (last on 9/8), with appropriate response. GI consulted and will likely require repeat EGD/ Colonoscopy. CT of IAC revealed left EAC fracture with hemorrhage, which required cauterization and packing. Patient has required multiple admissions for snycope with unknown cause, will likely require ILR to monitor for any cardiac arrhythmia that can be causing syncope.  Due to recurrent falls and fractures at home within a 6 month period, patient is unsafe to be discharged home. Will need to be discharged to a [facility] facility to improve his strength.

## 2020-09-14 NOTE — PROGRESS NOTE ADULT - ASSESSMENT
65M PMH HF due to NICM HM2 LVAD (2017) on coumadin, TV annuloplasty ring, SIADH, CKD3, presenting after fall. Patient reports he was reaching for ensure in the refrigerator and fell onto the floor. Patient does not remember falling , questionably sustained a syncopal event, reports being helped by his friend who he lives with to his bed, who then called a taxi to bring him to the ED. Does not recall additional details of events. Currently complaining of pain associated with swelling on is face, as well as pain on his left flank/abdomen.     INTERVAL EVENTS: Mentating at baseline in ED, admitted to thoracic surgery for correction of supratherapeutic INR and resuscitation for anemia. CT max-face and CT internal auditory canal revealed acute posterior displaced fracture of anterior wall of left external auditory canal, and right infraorbital soft tissue swelling.   9/8 < from: CT Maxillofacial No Cont (09.07.20 @ 18:41) >  CT IAC: Acute posteriorly displaced fracture of the anterior wall of the left external auditory canal. Partial opacification of the external auditory canal, likely with blood products.    CT maxillofacial: No acute maxillofacial bone fracture. Right facial/infraorbital soft tissue swelling.      Repeat head CT tonight  Pt with bleeding from L ear, ENT evaluating, packed x 2. No surgical intervention  INR up to 3.0, additional ffp x 1  9/9 L ear clot noted, no further active bleeding    CT : < from: CT Head No Cont (09.08.20 @ 17:53) >  No acute intracranial hemorrhage.  New right premaxillary soft tissue hematoma measuring 1.3 x 1.5 x 2.6 cm.      9/10   OOB to chair,   HCT  26   creat 1.18  on ald 25 qd  neg 500 cc  MAP  72,  rt facial edema   9/11   Facial edema improving.  Lisinopril resume yesterday, bp stable, would check orthostatics.  PT consulted , rec Home PT.  L ear packing has been removed  9/12 HD stable MAP 76  Creat 1.13.  PT recs now [facility].  9/13 Coumadin 2mg for INR 1.72 change diet to mechanical soft diet  9/14 MAP 72-76. Plan for [facility] pending placement. Coumadin 3.0 mg tonight for INR 1.42.

## 2020-09-14 NOTE — PROGRESS NOTE ADULT - SUBJECTIVE AND OBJECTIVE BOX
Patient seen resting comfortably in bed. Denies abd pain, tarry stool, and states " left ear bleeding as stopped."  VITAL SIGNS    Telemetry:  SR 90  Vital Signs Last 24 Hrs  T(C): 36.6 (09-14-20 @ 05:30), Max: 36.7 (09-13-20 @ 10:40)  T(F): 97.8 (09-14-20 @ 05:30), Max: 98.1 (09-13-20 @ 10:40)  HR: 102 (09-14-20 @ 05:30) (76 - 102)  BP: 87/63 (09-14-20 @ 05:30) (87/63 - 87/63)  RR: 18 (09-14-20 @ 05:30) (18 - 18)  SpO2: 96% (09-14-20 @ 05:30) (96% - 98%)            09-13 @ 07:01  -  09-14 @ 07:00  --------------------------------------------------------  IN: 640 mL / OUT: 660 mL / NET: -20 mL    09-14 @ 07:01  -  09-14 @ 09:41  --------------------------------------------------------  IN: 240 mL / OUT: 400 mL / NET: -160 mL       Daily     Daily   Admit Wt: Drug Dosing Weight  Height (cm): 177.8 (07 Sep 2020 22:07)  Weight (kg): 82 (07 Sep 2020 22:07)  BMI (kg/m2): 25.9 (07 Sep 2020 22:07)  BSA (m2): 2 (07 Sep 2020 22:07)    Bilirubin Total, Serum: 0.2 mg/dL (09-14 @ 06:19)    CAPILLARY BLOOD GLUCOSE              MEDICATIONS  acetaminophen   Tablet .. 650 milliGRAM(s) Oral every 6 hours PRN  aMIOdarone    Tablet 200 milliGRAM(s) Oral daily  cyanocobalamin 1000 MICROGram(s) Oral daily  ferrous    sulfate 325 milliGRAM(s) Oral three times a day  finasteride 5 milliGRAM(s) Oral daily  folic acid 1 milliGRAM(s) Oral daily  influenza   Vaccine 0.5 milliLiter(s) IntraMuscular once  lidocaine   Patch 1 Patch Transdermal every 24 hours  lisinopril 2.5 milliGRAM(s) Oral two times a day  metoprolol succinate ER 25 milliGRAM(s) Oral daily  mirtazapine 7.5 milliGRAM(s) Oral at bedtime  ofloxacin 0.3% Solution 5 Drop(s) Left Ear two times a day  oxyCODONE    IR 2.5 milliGRAM(s) Oral four times a day PRN  pantoprazole    Tablet 40 milliGRAM(s) Oral daily  polyethylene glycol 3350 17 Gram(s) Oral two times a day  senna 2 Tablet(s) Oral at bedtime  simethicone 80 milliGRAM(s) Chew three times a day  spironolactone 25 milliGRAM(s) Oral daily  warfarin 3.5 milliGRAM(s) Oral once      >>> <<<  PHYSICAL EXAM  Neurology: alert and oriented x 3, nonfocal, no gross deficits  CV : +VAD HUM   Lungs: CTA B/L, No wheezing, rales, rhonchi. Non labored respirations   Abdomen: soft, NT,ND, (+ )BM  :  voiding  Extremities: No LE edema bilaterally, +DP, No calf tenderness     LABS  09-14    131<L>  |  95<L>  |  19  ----------------------------<  95  4.6   |  22  |  1.24    Ca    9.6      14 Sep 2020 06:19    TPro  7.1  /  Alb  3.9  /  TBili  0.2  /  DBili  x   /  AST  22  /  ALT  29  /  AlkPhos  71  09-14                                 9.0    6.13  )-----------( 256      ( 14 Sep 2020 06:19 )             29.6          PT/INR - ( 14 Sep 2020 06:19 )   PT: 16.6 sec;   INR: 1.42 ratio         PTT - ( 14 Sep 2020 06:19 )  PTT:29.6 sec       PAST MEDICAL & SURGICAL HISTORY:  GI bleed    Vertigo    H/O epistaxis    Iron deficiency anemia    CKD (chronic kidney disease), stage III    Clavicle fracture    Falls    Anticoagulation goal of INR 2.0 to 2.5    ACC/AHA stage D systolic heart failure    BPH with urinary obstruction    Claudication    Peripheral arterial disease    Bleeding hemorrhoids    Hemorrhoids    History of 2019 novel coronavirus disease (COVID-19)  april 2020    Pleural effusion    Depression    CAD (coronary artery disease)    CHF (congestive heart failure)    S/P endoscopy    S/P ventricular assist device    S/P TVR (tricuspid valve repair)

## 2020-09-14 NOTE — CONSULT NOTE ADULT - ASSESSMENT
66 yo M with PMH of AHA stage D HF 2/2 NICM s/p LVAD 2017 on Coumadin, paroxysmal a fib on amiodarone, history of TV annuloplasty, anemia 2/2 recurrent GI bleed, SIADH and CKD3 who presented with recurrent fall in setting of possible syncope. EP consulted for evaluation of ILR.    #Syncope  - Two admissions for falls in last six months, in setting of ongoing anemia and hypotension, suspect vasovagal syncope  - However, given patient's advanced heart failure and substrate for VT, can't rule out as etiology  - Will discuss with attending re: ILR placement for additional monitoring  - Would obtain orthostatic vitals    #NSVT  - Noted on telemetry 9/8/20, none documented since  - Continue metoprolol for ectopy suppression    #Paroxysmal a fib  - On metoprolol succinate 25mg daily  - On amiodarone 200mg daily  - On Coumadin for anticoagulation, INR 1.42    Patient to be staffed with attending. Please await attending addendum.    Vale Fierro MD  Cardiology Fellow  [phone]  All Cardiology service information can be found 24/7 on [email or web address], password: [email or web address] 66 yo M with PMH of AHA stage D HF 2/2 NICM s/p LVAD 2017 on Coumadin, paroxysmal a fib on amiodarone, history of TV annuloplasty, anemia 2/2 recurrent GI bleed, SIADH and CKD3 who presented with recurrent fall in setting of possible syncope. EP consulted for evaluation of ILR.    #Syncope  - Two admissions for falls in last six months, in setting of ongoing anemia and hypotension, suspect vasovagal syncope  - However, given patient's advanced heart failure and substrate for VT, can't rule out as etiology  - Plan for ILR placement for monitoring tomorrow with Dr. Alonso  - Would obtain orthostatic vitals    #Paroxysmal a fib  - On metoprolol succinate 25mg daily  - On amiodarone 200mg daily  - On Coumadin for anticoagulation, most recent INR 1.42    #NSVT  - Noted on telemetry 9/8/20, none documented since  - Continue metoprolol and amiodarone for ectopy suppression    Patient to be staffed with attending. Please await attending addendum.    Vale Fierro MD  Cardiology Fellow  [phone]  All Cardiology service information can be found 24/7 on [email or web address], password: [email or web address]

## 2020-09-15 LAB
ALBUMIN SERPL ELPH-MCNC: 3.8 G/DL — SIGNIFICANT CHANGE UP (ref 3.3–5)
ALP SERPL-CCNC: 69 U/L — SIGNIFICANT CHANGE UP (ref 40–120)
ALT FLD-CCNC: 25 U/L — SIGNIFICANT CHANGE UP (ref 10–45)
ANION GAP SERPL CALC-SCNC: 13 MMOL/L — SIGNIFICANT CHANGE UP (ref 5–17)
APTT BLD: 29.8 SEC — SIGNIFICANT CHANGE UP (ref 27.5–35.5)
AST SERPL-CCNC: 23 U/L — SIGNIFICANT CHANGE UP (ref 10–40)
BILIRUB SERPL-MCNC: 0.2 MG/DL — SIGNIFICANT CHANGE UP (ref 0.2–1.2)
BUN SERPL-MCNC: 23 MG/DL — SIGNIFICANT CHANGE UP (ref 7–23)
CALCIUM SERPL-MCNC: 9.5 MG/DL — SIGNIFICANT CHANGE UP (ref 8.4–10.5)
CHLORIDE SERPL-SCNC: 96 MMOL/L — SIGNIFICANT CHANGE UP (ref 96–108)
CO2 SERPL-SCNC: 22 MMOL/L — SIGNIFICANT CHANGE UP (ref 22–31)
CREAT SERPL-MCNC: 1.23 MG/DL — SIGNIFICANT CHANGE UP (ref 0.5–1.3)
GLUCOSE SERPL-MCNC: 91 MG/DL — SIGNIFICANT CHANGE UP (ref 70–99)
HCT VFR BLD CALC: 30.9 % — LOW (ref 39–50)
HGB BLD-MCNC: 9.7 G/DL — LOW (ref 13–17)
INR BLD: 1.41 RATIO — HIGH (ref 0.88–1.16)
LDH SERPL L TO P-CCNC: 224 U/L — SIGNIFICANT CHANGE UP (ref 50–242)
MCHC RBC-ENTMCNC: 30.3 PG — SIGNIFICANT CHANGE UP (ref 27–34)
MCHC RBC-ENTMCNC: 31.4 GM/DL — LOW (ref 32–36)
MCV RBC AUTO: 96.6 FL — SIGNIFICANT CHANGE UP (ref 80–100)
NRBC # BLD: 0 /100 WBCS — SIGNIFICANT CHANGE UP (ref 0–0)
PLATELET # BLD AUTO: 282 K/UL — SIGNIFICANT CHANGE UP (ref 150–400)
POTASSIUM SERPL-MCNC: 4.8 MMOL/L — SIGNIFICANT CHANGE UP (ref 3.5–5.3)
POTASSIUM SERPL-SCNC: 4.8 MMOL/L — SIGNIFICANT CHANGE UP (ref 3.5–5.3)
PROT SERPL-MCNC: 7.2 G/DL — SIGNIFICANT CHANGE UP (ref 6–8.3)
PROTHROM AB SERPL-ACNC: 16.5 SEC — HIGH (ref 10.6–13.6)
RBC # BLD: 3.2 M/UL — LOW (ref 4.2–5.8)
RBC # FLD: 18.5 % — HIGH (ref 10.3–14.5)
SARS-COV-2 RNA SPEC QL NAA+PROBE: SIGNIFICANT CHANGE UP
SODIUM SERPL-SCNC: 131 MMOL/L — LOW (ref 135–145)
WBC # BLD: 7.32 K/UL — SIGNIFICANT CHANGE UP (ref 3.8–10.5)
WBC # FLD AUTO: 7.32 K/UL — SIGNIFICANT CHANGE UP (ref 3.8–10.5)

## 2020-09-15 PROCEDURE — 99233 SBSQ HOSP IP/OBS HIGH 50: CPT | Mod: 25

## 2020-09-15 PROCEDURE — 33285 INSJ SUBQ CAR RHYTHM MNTR: CPT

## 2020-09-15 PROCEDURE — 93750 INTERROGATION VAD IN PERSON: CPT

## 2020-09-15 PROCEDURE — 99232 SBSQ HOSP IP/OBS MODERATE 35: CPT

## 2020-09-15 RX ORDER — WARFARIN SODIUM 2.5 MG/1
3 TABLET ORAL ONCE
Refills: 0 | Status: COMPLETED | OUTPATIENT
Start: 2020-09-15 | End: 2020-09-15

## 2020-09-15 RX ADMIN — AMIODARONE HYDROCHLORIDE 200 MILLIGRAM(S): 400 TABLET ORAL at 05:25

## 2020-09-15 RX ADMIN — LIDOCAINE 1 PATCH: 4 CREAM TOPICAL at 07:36

## 2020-09-15 RX ADMIN — Medication 25 MILLIGRAM(S): at 05:25

## 2020-09-15 RX ADMIN — LISINOPRIL 2.5 MILLIGRAM(S): 2.5 TABLET ORAL at 05:25

## 2020-09-15 RX ADMIN — SIMETHICONE 80 MILLIGRAM(S): 80 TABLET, CHEWABLE ORAL at 05:25

## 2020-09-15 RX ADMIN — SENNA PLUS 2 TABLET(S): 8.6 TABLET ORAL at 22:12

## 2020-09-15 RX ADMIN — MIRTAZAPINE 7.5 MILLIGRAM(S): 45 TABLET, ORALLY DISINTEGRATING ORAL at 22:12

## 2020-09-15 RX ADMIN — Medication 1 MILLIGRAM(S): at 08:45

## 2020-09-15 RX ADMIN — LISINOPRIL 2.5 MILLIGRAM(S): 2.5 TABLET ORAL at 17:36

## 2020-09-15 RX ADMIN — SIMETHICONE 80 MILLIGRAM(S): 80 TABLET, CHEWABLE ORAL at 22:15

## 2020-09-15 RX ADMIN — SPIRONOLACTONE 25 MILLIGRAM(S): 25 TABLET, FILM COATED ORAL at 05:25

## 2020-09-15 RX ADMIN — Medication 5 DROP(S): at 05:26

## 2020-09-15 RX ADMIN — FINASTERIDE 5 MILLIGRAM(S): 5 TABLET, FILM COATED ORAL at 08:45

## 2020-09-15 RX ADMIN — LIDOCAINE 1 PATCH: 4 CREAM TOPICAL at 08:48

## 2020-09-15 RX ADMIN — Medication 5 DROP(S): at 17:36

## 2020-09-15 RX ADMIN — Medication 325 MILLIGRAM(S): at 22:12

## 2020-09-15 RX ADMIN — WARFARIN SODIUM 3 MILLIGRAM(S): 2.5 TABLET ORAL at 22:13

## 2020-09-15 RX ADMIN — SIMETHICONE 80 MILLIGRAM(S): 80 TABLET, CHEWABLE ORAL at 13:17

## 2020-09-15 RX ADMIN — Medication 325 MILLIGRAM(S): at 13:17

## 2020-09-15 RX ADMIN — PREGABALIN 1000 MICROGRAM(S): 225 CAPSULE ORAL at 08:45

## 2020-09-15 RX ADMIN — Medication 325 MILLIGRAM(S): at 05:25

## 2020-09-15 RX ADMIN — PANTOPRAZOLE SODIUM 40 MILLIGRAM(S): 20 TABLET, DELAYED RELEASE ORAL at 08:45

## 2020-09-15 NOTE — PROGRESS NOTE ADULT - PROBLEM SELECTOR PLAN 6
- complains of LUQ and LLQ discomfort. Mild tenderness noted on exam, likely related to fall  - KUB 9/10 shows non-obstructive gas pattern  - LFT, Amylase, Lipase all WNL  - Continue Simethicone 80 mg TID for gas discomfort  - Abdominal US completed 9/14; normal - complains of LUQ and LLQ discomfort. Mild tenderness noted on exam, likely related to fall  - KUB 9/10 shows non-obstructive gas pattern, Abd u/s normal, CT abdomen with contrast without pathology and recent endoscopy without pathology. There is no signs of acute pathology.   - LFT, Amylase, Lipase all WNL  - Continue Simethicone 80 mg TID for gas discomfort

## 2020-09-15 NOTE — PROGRESS NOTE ADULT - PROBLEM SELECTOR PLAN 2
sp fall, rt facial edema  follow up ENT as outpt  CT of IAC shows acute posteriorly displaced fracture of the anterior wall of the left external auditory canal with partial opacification of the external auditory canal.  Left ear cauterized and packed with surgicel x 1, packing removed by ENT 9/10

## 2020-09-15 NOTE — PROGRESS NOTE ADULT - PROBLEM SELECTOR PLAN 3
- Continue at current speed (8800)  - Re-admitted with supra-therapeutic INR of 5.14, s/p 2unts of FFP (last on 9/8).   - Continue dosing Coumadin for anticoagulation (INR goal 2-2.5). Will dose 3 mg tonight  - Continue to hold ASA for recurrent bleeds   - Continue to monitor LDH levels daily.

## 2020-09-15 NOTE — PROCEDURE NOTE - ADDITIONAL PROCEDURE DETAILS
[place] JOINT  [id number]    PROCEDURE:  ILR Implant      INDICATION:  recurrent syncope in a patient with LVAD and No clear etiology      ELECTROPHYSIOLOGIST(S):  Attending Dr. Alonso  Fellow Essie Sr      ANESTHESIOLOGY:  local anesthetic only      FINDINGS:  - mapping completed at bedside for positioning to minimize noise from LVAD  - Consent: An ILR implant was explained to the patient in great detail. The benefits, alternatives, and risks (including but not limited to death, MI, CVA, infection, and pocket hematoma) were reviewed. All questions were answered. Informed consent was signed.   - The patient was brought to the Electrophysiology Laboratory and the left chest was prepped and draped in the usual sterile fashion. The region below the 4th ICS was anesthetized with 1% lidocaine. A skin fold was raised and an incision measuring g <1cm was made with the LinQ blade at a right angle at the base of the skin fold at a 45 degree angle with the sternum. While the skin fold remained raised, the insertion tool was inserted into the subcutaneous tissue. The insertion tool was then rotated 180 degrees to create a small pocket. The plunger was placed into the insertion tool and the device was delivered subcutaneously. The plunger was then removed. While the skin fold was raised, the insertion tool was removed. Ventricular and atrial sensing were deemed appropriate. A dry field was observed. The wound was dressed with Dermabond and a sterile dressing. The patient left the Electrophysiology Laboratory in good condition.  - No LVAD-related noise observed on the ILR signals      COMPLICATIONS:  no immediate complications      RECOMMENDATIONS:  - resume all meds including anti-coagulation

## 2020-09-15 NOTE — PROGRESS NOTE ADULT - ASSESSMENT
65M PMH HF due to NICM HM2 LVAD (2017) on coumadin, TV annuloplasty ring, SIADH, CKD3, presenting after fall. Patient reports he was reaching for ensure in the refrigerator and fell onto the floor. Patient does not remember falling , questionably sustained a syncopal event, reports being helped by his friend who he lives with to his bed, who then called a taxi to bring him to the ED. Does not recall additional details of events. Currently complaining of pain associated with swelling on is face, as well as pain on his left flank/abdomen.     INTERVAL EVENTS: Mentating at baseline in ED, admitted to thoracic surgery for correction of supratherapeutic INR and resuscitation for anemia. CT max-face and CT internal auditory canal revealed acute posterior displaced fracture of anterior wall of left external auditory canal, and right infraorbital soft tissue swelling.   9/8 < from: CT Maxillofacial No Cont (09.07.20 @ 18:41) >  CT IAC: Acute posteriorly displaced fracture of the anterior wall of the left external auditory canal. Partial opacification of the external auditory canal, likely with blood products.    CT maxillofacial: No acute maxillofacial bone fracture. Right facial/infraorbital soft tissue swelling.      Repeat head CT tonight  Pt with bleeding from L ear, ENT evaluating, packed x 2. No surgical intervention  INR up to 3.0, additional ffp x 1  9/9 L ear clot noted, no further active bleeding    CT : < from: CT Head No Cont (09.08.20 @ 17:53) >  No acute intracranial hemorrhage.  New right premaxillary soft tissue hematoma measuring 1.3 x 1.5 x 2.6 cm.      9/10   OOB to chair,   HCT  26   creat 1.18  on ald 25 qd  neg 500 cc  MAP  72,  rt facial edema   9/11   Facial edema improving.  Lisinopril resume yesterday, bp stable, would check orthostatics.  PT consulted , rec Home PT.  L ear packing has been removed  9/12 HD stable MAP 76  Creat 1.13.  PT recs now [facility].  9/13 Coumadin 2mg for INR 1.72 change diet to mechanical soft diet  9/14 MAP 72-76. Plan for [facility] pending placement. Coumadin 3.0 mg tonight for INR 1.42.   9/15 Plan for ILR placement by EP (Dr. Alonso), INR 1.41 --> coumadin 3.5 tonight. 65M PMH HF due to NICM HM2 LVAD (2017) on coumadin, TV annuloplasty ring, SIADH, CKD3, presenting after fall. Patient reports he was reaching for ensure in the refrigerator and fell onto the floor. Patient does not remember falling , questionably sustained a syncopal event, reports being helped by his friend who he lives with to his bed, who then called a taxi to bring him to the ED. Does not recall additional details of events. Currently complaining of pain associated with swelling on is face, as well as pain on his left flank/abdomen.     INTERVAL EVENTS: Mentating at baseline in ED, admitted to thoracic surgery for correction of supratherapeutic INR and resuscitation for anemia. CT max-face and CT internal auditory canal revealed acute posterior displaced fracture of anterior wall of left external auditory canal, and right infraorbital soft tissue swelling.   9/8 < from: CT Maxillofacial No Cont (09.07.20 @ 18:41) >  CT IAC: Acute posteriorly displaced fracture of the anterior wall of the left external auditory canal. Partial opacification of the external auditory canal, likely with blood products.    CT maxillofacial: No acute maxillofacial bone fracture. Right facial/infraorbital soft tissue swelling.      Repeat head CT tonight  Pt with bleeding from L ear, ENT evaluating, packed x 2. No surgical intervention  INR up to 3.0, additional ffp x 1  9/9 L ear clot noted, no further active bleeding    CT : < from: CT Head No Cont (09.08.20 @ 17:53) >  No acute intracranial hemorrhage.  New right premaxillary soft tissue hematoma measuring 1.3 x 1.5 x 2.6 cm.      9/10   OOB to chair,   HCT  26   creat 1.18  on ald 25 qd  neg 500 cc  MAP  72,  rt facial edema   9/11   Facial edema improving.  Lisinopril resume yesterday, bp stable, would check orthostatics.  PT consulted , rec Home PT.  L ear packing has been removed  9/12 HD stable MAP 76  Creat 1.13.  PT recs now [facility].  9/13 Coumadin 2mg for INR 1.72 change diet to mechanical soft diet  9/14 MAP 72-76. Plan for [facility] pending placement. Coumadin 3.0 mg tonight for INR 1.42.   9/15 Plan for ILR placement by EP (Dr. Alonso), INR 1.41 --> coumadin 3.0 tonight.

## 2020-09-15 NOTE — PROGRESS NOTE ADULT - PROBLEM SELECTOR PLAN 1
Coumadin 3mg tonight-- INR 1.42 (goal 1.8-2.0)  HF team following  Plan for [facility] Coumadin 3.5 mg tonight-- INR 1.41 (goal 1.8-2.0)  HF team following  Plan for [facility] Coumadin 3.0 mg tonight-- INR 1.41 (goal 1.8-2.0)  HF team following  Plan for [facility]

## 2020-09-15 NOTE — PROGRESS NOTE ADULT - ASSESSMENT
65M PMH HF due to NICM HM2 LVAD (2017) on coumadin, TV annuloplasty ring, SIADH, CKD3, was readmitted after fall and stating he had BRBPR. Upon admission patient was noted to have H/H 6.3/21.9, was transfused with 2uPRBC (last on 9/8), with appropriate response. GI consulted and will likely require repeat EGD/ Colonoscopy. CT of IAC revealed left EAC fracture with hemorrhage, which required cauterization and packing. Patient has required multiple admissions for snycope with unknown cause, will likely require ILR to monitor for any cardiac arrhythmia that can be causing syncope.  Due to recurrent falls and fractures at home within a 6 month period, patient is unsafe to be discharged home. Will need to be discharged to a [facility] facility to improve his strength.

## 2020-09-15 NOTE — PROGRESS NOTE ADULT - SUBJECTIVE AND OBJECTIVE BOX
Subjective: Patient seen and examined resting in bed. ON no issues.     Medications:  acetaminophen   Tablet .. 650 milliGRAM(s) Oral every 6 hours PRN  aMIOdarone    Tablet 200 milliGRAM(s) Oral daily  cyanocobalamin 1000 MICROGram(s) Oral daily  ferrous    sulfate 325 milliGRAM(s) Oral three times a day  finasteride 5 milliGRAM(s) Oral daily  folic acid 1 milliGRAM(s) Oral daily  influenza   Vaccine 0.5 milliLiter(s) IntraMuscular once  lidocaine   Patch 1 Patch Transdermal every 24 hours  lisinopril 2.5 milliGRAM(s) Oral two times a day  metoprolol succinate ER 25 milliGRAM(s) Oral daily  mirtazapine 7.5 milliGRAM(s) Oral at bedtime  ofloxacin 0.3% Solution 5 Drop(s) Left Ear two times a day  oxyCODONE    IR 2.5 milliGRAM(s) Oral four times a day PRN  pantoprazole    Tablet 40 milliGRAM(s) Oral daily  polyethylene glycol 3350 17 Gram(s) Oral two times a day  senna 2 Tablet(s) Oral at bedtime  simethicone 80 milliGRAM(s) Chew three times a day  spironolactone 25 milliGRAM(s) Oral daily  warfarin 3 milliGRAM(s) Oral once        Vitals:  Vital Signs Last 24 Hours  T(C): 36.9 (20 @ 15:04), Max: 36.9 (20 @ 15:04)  HR: --  BP: 87/63 (20 @ 15:04) (87/61 - 87/63)  RR: --  SpO2: --    Tele: SR 80-90    Weight in k (09-15 @ 08:15)    I&O's Summary    14 Sep 2020 07:01  -  15 Sep 2020 07:00  --------------------------------------------------------  IN: 790 mL / OUT: 1025 mL / NET: -235 mL        Physical Exam  General: No distress. Comfortable.  HEENT: Right facial swelling along with infra orbital ecchymosis  Neck: Neck supple. No JVP noted. No masses  Chest: Clear to auscultation bilaterally  CV: +VAD hum  Abdomen: Soft, non-distended, mild left sided tenderness without rebound or guarding  Skin: No rashes or skin breakdown  Neurology: Alert and oriented times three. Sensation intact      LVAD Interrogation  VAD TYPE HM 2  Speed 8800  Flow 3.8  Power 4.5  PI 5.3  Assessment of driveline exit site: driveline dressing c/d/i  Programming changes: no changes made     Labs:                        9.7    7.32  )-----------( 282      ( 15 Sep 2020 06:11 )             30.9     09-15    131<L>  |  96  |  23  ----------------------------<  91  4.8   |  22  |  1.23    Ca    9.5      15 Sep 2020 06:11    TPro  7.2  /  Alb  3.8  /  TBili  0.2  /  DBili  x   /  AST  23  /  ALT  25  /  AlkPhos  69  09-15    PT/INR - ( 15 Sep 2020 06:11 )   PT: 16.5 sec;   INR: 1.41 ratio         PTT - ( 15 Sep 2020 06:11 )  PTT:29.8 sec      Lactate Dehydrogenase, Serum: 224 U/L (09-15 @ 06:11)  Lactate Dehydrogenase, Serum: 219 U/L ( @ 06:19)  Lactate Dehydrogenase, Serum: 223 U/L ( @ 05:33)      Imaging Studies     < from: US Abdomen Complete (US Abdomen Complete .) (20 @ 12:58) >  IMPRESSION: Slightly limited.  Normal abdominal ultrasound.  < end of copied text >

## 2020-09-15 NOTE — PROGRESS NOTE ADULT - SUBJECTIVE AND OBJECTIVE BOX
Interval History: No complaints, no recurrent syncope overnight    Review Of Systems:  Constitutional: [ ] Fever [ ] Chills [ ] Fatigue [ ] Weight change   HEENT: [ ] Blurred vision [ ] Eye Pain [ ] Headache [ ] Runny nose [ ] Sore Throat   Respiratory: [ ] Cough [ ] Wheezing [ ] Shortness of breath  Cardiovascular: [ ] Chest Pain [ ] Palpitations [ ] PEÑA [ ] PND [ ] Orthopnea  Gastrointestinal: [ ] Abdominal Pain [ ] Diarrhea [ ] Constipation [ ] Hemorrhoids [ ] Nausea [ ] Vomiting  Genitourinary: [ ] Nocturia [ ] Dysuria [ ] Incontinence  Extremities: [ ] Swelling [ ] Joint Pain  Neurologic: [ ] Focal deficit [ ] Paresthesias [ ] Syncope  Lymphatic: [ ] Swelling [ ] Lymphadenopathy   Skin: [ ] Rash [ ] Ecchymoses [ ] Wounds [ ] Lesions  Psychiatry: [ ] Depression [ ] Suicidal/Homicidal Ideation [ ] Anxiety [ ] Sleep Disturbances  [x] 10 point review of systems is otherwise negative except as mentioned above    Medications:  acetaminophen   Tablet .. 650 milliGRAM(s) Oral every 6 hours PRN  aMIOdarone    Tablet 200 milliGRAM(s) Oral daily  cyanocobalamin 1000 MICROGram(s) Oral daily  ferrous    sulfate 325 milliGRAM(s) Oral three times a day  finasteride 5 milliGRAM(s) Oral daily  folic acid 1 milliGRAM(s) Oral daily  influenza   Vaccine 0.5 milliLiter(s) IntraMuscular once  lidocaine   Patch 1 Patch Transdermal every 24 hours  lisinopril 2.5 milliGRAM(s) Oral two times a day  metoprolol succinate ER 25 milliGRAM(s) Oral daily  mirtazapine 7.5 milliGRAM(s) Oral at bedtime  ofloxacin 0.3% Solution 5 Drop(s) Left Ear two times a day  oxyCODONE    IR 2.5 milliGRAM(s) Oral four times a day PRN  pantoprazole    Tablet 40 milliGRAM(s) Oral daily  polyethylene glycol 3350 17 Gram(s) Oral two times a day  senna 2 Tablet(s) Oral at bedtime  simethicone 80 milliGRAM(s) Chew three times a day  spironolactone 25 milliGRAM(s) Oral daily  warfarin 3 milliGRAM(s) Oral once      Vitals:  ICU Vital Signs Last 24 Hrs  T(C): --  T(F): --  HR: --  BP: 84/71 (15 Sep 2020 12:12) (84/71 - 84/71)  BP(mean): 75 (15 Sep 2020 12:12) (70 - 82)  ABP: --  ABP(mean): --  RR: 18 (15 Sep 2020 08:36) (18 - 18)  SpO2: 98% (15 Sep 2020 08:36) (98% - 98%)    Daily     Daily Weight in k (15 Sep 2020 08:15)  I&O's Summary    14 Sep 2020 07:01  -  15 Sep 2020 07:00  --------------------------------------------------------  IN: 790 mL / OUT: 1025 mL / NET: -235 mL    15 Sep 2020 07:01  -  15 Sep 2020 15:39  --------------------------------------------------------  IN: 500 mL / OUT: 200 mL / NET: 300 mL    Physical Exam:  Appearance:  NAD  HENT: R conjunctival hemorrhage with orbital edema unchanged  Cardiovascular: LVAD hum, no LE Edema Present  Respiratory: Clear to auscultation bilaterally anteriorly  Gastrointestinal: Soft  Psychiatry: [x] AAOx3  [x] Follows Commands  Skin: Intact    Labs:                        9.7    7.32  )-----------( 282      ( 15 Sep 2020 06:11 )             30.9     09-15    131<L>  |  96  |  23  ----------------------------<  91  4.8   |  22  |  1.23    Ca    9.5      15 Sep 2020 06:11    TPro  7.2  /  Alb  3.8  /  TBili  0.2  /  DBili  x   /  AST  23  /  ALT  25  /  AlkPhos  69  09-15    PT/INR - ( 15 Sep 2020 06:11 )   PT: 16.5 sec;   INR: 1.41 ratio         PTT - ( 15 Sep 2020 06:11 )  PTT:29.8 sec    Interpretation of Telemetry: sinus rhythm

## 2020-09-15 NOTE — PROGRESS NOTE ADULT - ASSESSMENT
64 yo M with PMH of AHA stage D HF 2/2 NICM s/p LVAD 2017 on Coumadin, paroxysmal a fib on amiodarone, history of TV annuloplasty, anemia 2/2 recurrent GI bleed, SIADH and CKD3 who presented with recurrent fall in setting of possible syncope. EP consulted for evaluation of ILR.    #Syncope  - Two admissions for falls in last six months, in setting of ongoing anemia and hypotension, suspect vasovagal syncope  - However, given patient's advanced heart failure and substrate for VT, can't rule out as etiology if underfilling of LV during arrythmia  - TTE reviewed, RV with normal function  - Plan for ILR placement today, mapping completed at bedside for positioning to minimize noise from LVAD  - Would obtain orthostatic vitals    #Paroxysmal a fib  - On metoprolol succinate 25mg daily  - On amiodarone 200mg daily  - On Coumadin for anticoagulation    #NSVT  - Noted on telemetry 9/8/20, none documented since  - Continue metoprolol and amiodarone for ectopy suppression    Patient to be staffed with attending. Please await attending addendum.    Vale Fierro MD  Cardiology Fellow  [phone]  All Cardiology service information can be found 24/7 on [email or web address], password: [email or web address]

## 2020-09-15 NOTE — PROGRESS NOTE ADULT - PROBLEM SELECTOR PLAN 7
- hx of syncope without known cause. has required multiple hospitalizations in the setting of low hemoglobin and elevated INR.   - Given patients advanced heart failure and substrate for VT, EP consulted for ILR implant, will be placed later today (9/15). - hx of syncope without known cause. has required multiple hospitalizations in the setting of low hemoglobin and elevated INR.   - Given patients advanced heart failure and substrate for VT, EP consulted for ILR implant, will be placed later today (9/15).  - Orthostatics negative

## 2020-09-15 NOTE — CHART NOTE - NSCHARTNOTEFT_GEN_A_CORE
?Procedure Performed	   ILR implantation (93257)   • TIME OUT	Patient's first and last name, , procedure, and correct site confirmed prior to the start of procedure.   • Practitioner performing the TIME OUT	Marciano Alonso [facility]    • Procedure Date/Time	9/15/2020  15:23pm    • Informed Consent   	Benefits, risks, and possible complications of procedure explained to patient/caregiver who verbalized understanding and gave written consent.   • Procedure Performed By	Marciano Alonso MD   • Procedure Assisted By        Essie Sr MD   •Referring MD                     Cristian Bo MD   • Indications                        Syncope/presyncope in the setting of an CF LVAD   ?Preprocedure Antibiotic	Cefazolin 2 gm IV   • Site Prep	Chlorhexidine    • Anatomic Location	Left 4th ICS   • Patient Position	supine   • Procedural Sedation Used	No   • Local Anesthesia	Lidocaine 2% with epi    • General Procedure Details	The chest wall was prepped and draped. Local anesthetic (10 cc) was infiltrated subcutaneously at the 4th left ICS and along a 45 degree inferiorly directed angle. A nick was made with the Linq scalpel 1cm to lateral to the 4th LICS. The Linq device was advanced with the trochar delivery system, rotated 180 degrees then injected into place. The trochar was removed and the skin was closed with Dermabond. Excellent hemostasis obtained. A dressing was applied.   • Complications	None    • Estimated Blood Loss	None    • Post-Procedure Care Guidelines	Verbal/written post procedure instructions were given to patient/caregiver   • Additional Procedure Details	Device: Medtronic Linq II [id number] MRI compatible, serial [id number]. R waves 1.6mV. Good P wave discrimination. No noise artifact from LVAD.

## 2020-09-15 NOTE — PROGRESS NOTE ADULT - SUBJECTIVE AND OBJECTIVE BOX
VITAL SIGNS    Telemetry: SR 80-90  Vital Signs Last 24 Hrs  T(C): 36.9 (20 @ 15:04), Max: 36.9 (20 @ 15:04)  T(F): 98.4 (20 @ 15:04), Max: 98.4 (20 @ 15:04)  HR: --  BP: 87/63 (20 @ 15:04) (87/61 - 87/63)  RR: --  SpO2: --             @ 07:01  -  09-15 @ 07:00  --------------------------------------------------------  IN: 790 mL / OUT: 1025 mL / NET: -235 mL       Daily     Daily Weight in k (14 Sep 2020 13:18)  Admit Wt: Drug Dosing Weight  Height (cm): 177.8 (07 Sep 2020 22:07)  Weight (kg): 82 (07 Sep 2020 22:07)  BMI (kg/m2): 25.9 (07 Sep 2020 22:07)  BSA (m2): 2 (07 Sep 2020 22:07)    Bilirubin Total, Serum: 0.2 mg/dL (09-15 @ 06:11)    CAPILLARY BLOOD GLUCOSE            MEDICATIONS  acetaminophen   Tablet .. 650 milliGRAM(s) Oral every 6 hours PRN  aMIOdarone    Tablet 200 milliGRAM(s) Oral daily  cyanocobalamin 1000 MICROGram(s) Oral daily  ferrous    sulfate 325 milliGRAM(s) Oral three times a day  finasteride 5 milliGRAM(s) Oral daily  folic acid 1 milliGRAM(s) Oral daily  influenza   Vaccine 0.5 milliLiter(s) IntraMuscular once  lidocaine   Patch 1 Patch Transdermal every 24 hours  lisinopril 2.5 milliGRAM(s) Oral two times a day  metoprolol succinate ER 25 milliGRAM(s) Oral daily  mirtazapine 7.5 milliGRAM(s) Oral at bedtime  ofloxacin 0.3% Solution 5 Drop(s) Left Ear two times a day  oxyCODONE    IR 2.5 milliGRAM(s) Oral four times a day PRN  pantoprazole    Tablet 40 milliGRAM(s) Oral daily  polyethylene glycol 3350 17 Gram(s) Oral two times a day  senna 2 Tablet(s) Oral at bedtime  simethicone 80 milliGRAM(s) Chew three times a day  spironolactone 25 milliGRAM(s) Oral daily      >>> <<<  PHYSICAL EXAM  Subjective: " Hi. I am doing well."  Neurology: alert and oriented x 3, nonfocal, no gross deficits  CV : +VAD Hum   +Driveline site c/d/i   Lungs: CTA B/L, No wheezing, rales, rhonchi. Non labored respirations   Abdomen: soft, NT, ND, (+ )BM  :  voiding  Extremities:       LABS  09-15    131<L>  |  96  |  23  ----------------------------<  91  4.8   |  22  |  1.23    Ca    9.5      15 Sep 2020 06:11    TPro  7.2  /  Alb  3.8  /  TBili  0.2  /  DBili  x   /  AST  23  /  ALT  25  /  AlkPhos  69  09-15                                 9.7    7.32  )-----------( 282      ( 15 Sep 2020 06:11 )             30.9          PT/INR - ( 15 Sep 2020 06:11 )   PT: 16.5 sec;   INR: 1.41 ratio         PTT - ( 15 Sep 2020 06:11 )  PTT:29.8 sec       PAST MEDICAL & SURGICAL HISTORY:  GI bleed    Vertigo    H/O epistaxis    Iron deficiency anemia    CKD (chronic kidney disease), stage III    Clavicle fracture    Falls    Anticoagulation goal of INR 2.0 to 2.5    ACC/AHA stage D systolic heart failure    BPH with urinary obstruction    Claudication    Peripheral arterial disease    Bleeding hemorrhoids    Hemorrhoids    History of 2019 novel coronavirus disease (COVID-19)  2020    Pleural effusion    Depression    CAD (coronary artery disease)    CHF (congestive heart failure)    S/P endoscopy    S/P ventricular assist device    S/P TVR (tricuspid valve repair)

## 2020-09-15 NOTE — PROGRESS NOTE ADULT - PROBLEM SELECTOR PLAN 2
- Continue Lisinopril 2.5 mg BID   - Continue home Metoprolol succinate 25 mg daily  - Continue home Aldactone 25 mg daily. - Continue Lisinopril 2.5 mg BID   - Continue home Metoprolol succinate 25 mg daily  - Continue home Aldactone 25 mg daily.  - Will avoid low MAPs given falls. Currently MAPS 75-82
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
## 2020-09-16 PROBLEM — I50.20 UNSPECIFIED SYSTOLIC (CONGESTIVE) HEART FAILURE: Chronic | Status: ACTIVE | Noted: 2020-09-08

## 2020-09-16 PROBLEM — I73.9 PERIPHERAL VASCULAR DISEASE, UNSPECIFIED: Chronic | Status: ACTIVE | Noted: 2020-09-08

## 2020-09-16 PROBLEM — K64.9 UNSPECIFIED HEMORRHOIDS: Chronic | Status: ACTIVE | Noted: 2020-09-08

## 2020-09-16 PROBLEM — N18.3 CHRONIC KIDNEY DISEASE, STAGE 3 (MODERATE): Chronic | Status: ACTIVE | Noted: 2020-09-08

## 2020-09-16 PROBLEM — D50.9 IRON DEFICIENCY ANEMIA, UNSPECIFIED: Chronic | Status: ACTIVE | Noted: 2020-09-08

## 2020-09-16 PROBLEM — Z87.898 PERSONAL HISTORY OF OTHER SPECIFIED CONDITIONS: Chronic | Status: ACTIVE | Noted: 2020-09-08

## 2020-09-16 PROBLEM — S42.009A FRACTURE OF UNSPECIFIED PART OF UNSPECIFIED CLAVICLE, INITIAL ENCOUNTER FOR CLOSED FRACTURE: Chronic | Status: ACTIVE | Noted: 2020-09-08

## 2020-09-16 PROBLEM — N40.1 BENIGN PROSTATIC HYPERPLASIA WITH LOWER URINARY TRACT SYMPTOMS: Chronic | Status: ACTIVE | Noted: 2020-09-08

## 2020-09-16 PROBLEM — R42 DIZZINESS AND GIDDINESS: Chronic | Status: ACTIVE | Noted: 2020-09-08

## 2020-09-16 PROBLEM — K92.2 GASTROINTESTINAL HEMORRHAGE, UNSPECIFIED: Chronic | Status: ACTIVE | Noted: 2020-09-08

## 2020-09-16 PROBLEM — Z51.81 ENCOUNTER FOR THERAPEUTIC DRUG LEVEL MONITORING: Chronic | Status: ACTIVE | Noted: 2020-09-08

## 2020-09-16 LAB
ALBUMIN SERPL ELPH-MCNC: 3.9 G/DL — SIGNIFICANT CHANGE UP (ref 3.3–5)
ALP SERPL-CCNC: 70 U/L — SIGNIFICANT CHANGE UP (ref 40–120)
ALT FLD-CCNC: 21 U/L — SIGNIFICANT CHANGE UP (ref 10–45)
ANION GAP SERPL CALC-SCNC: 13 MMOL/L — SIGNIFICANT CHANGE UP (ref 5–17)
APTT BLD: 31.3 SEC — SIGNIFICANT CHANGE UP (ref 27.5–35.5)
AST SERPL-CCNC: 20 U/L — SIGNIFICANT CHANGE UP (ref 10–40)
BILIRUB SERPL-MCNC: 0.2 MG/DL — SIGNIFICANT CHANGE UP (ref 0.2–1.2)
BUN SERPL-MCNC: 24 MG/DL — HIGH (ref 7–23)
CALCIUM SERPL-MCNC: 9.4 MG/DL — SIGNIFICANT CHANGE UP (ref 8.4–10.5)
CHLORIDE SERPL-SCNC: 97 MMOL/L — SIGNIFICANT CHANGE UP (ref 96–108)
CO2 SERPL-SCNC: 21 MMOL/L — LOW (ref 22–31)
CREAT SERPL-MCNC: 1.31 MG/DL — HIGH (ref 0.5–1.3)
GLUCOSE BLDC GLUCOMTR-MCNC: 113 MG/DL — HIGH (ref 70–99)
GLUCOSE BLDC GLUCOMTR-MCNC: 97 MG/DL — SIGNIFICANT CHANGE UP (ref 70–99)
GLUCOSE SERPL-MCNC: 93 MG/DL — SIGNIFICANT CHANGE UP (ref 70–99)
HCT VFR BLD CALC: 33.1 % — LOW (ref 39–50)
HGB BLD-MCNC: 10.2 G/DL — LOW (ref 13–17)
INR BLD: 1.39 RATIO — HIGH (ref 0.88–1.16)
LDH SERPL L TO P-CCNC: 241 U/L — SIGNIFICANT CHANGE UP (ref 50–242)
MCHC RBC-ENTMCNC: 29.8 PG — SIGNIFICANT CHANGE UP (ref 27–34)
MCHC RBC-ENTMCNC: 30.8 GM/DL — LOW (ref 32–36)
MCV RBC AUTO: 96.8 FL — SIGNIFICANT CHANGE UP (ref 80–100)
NRBC # BLD: 0 /100 WBCS — SIGNIFICANT CHANGE UP (ref 0–0)
PLATELET # BLD AUTO: 308 K/UL — SIGNIFICANT CHANGE UP (ref 150–400)
POTASSIUM SERPL-MCNC: 4.7 MMOL/L — SIGNIFICANT CHANGE UP (ref 3.5–5.3)
POTASSIUM SERPL-SCNC: 4.7 MMOL/L — SIGNIFICANT CHANGE UP (ref 3.5–5.3)
PROT SERPL-MCNC: 7.3 G/DL — SIGNIFICANT CHANGE UP (ref 6–8.3)
PROTHROM AB SERPL-ACNC: 16.3 SEC — HIGH (ref 10.6–13.6)
RBC # BLD: 3.42 M/UL — LOW (ref 4.2–5.8)
RBC # FLD: 18.2 % — HIGH (ref 10.3–14.5)
SODIUM SERPL-SCNC: 131 MMOL/L — LOW (ref 135–145)
WBC # BLD: 9.17 K/UL — SIGNIFICANT CHANGE UP (ref 3.8–10.5)
WBC # FLD AUTO: 9.17 K/UL — SIGNIFICANT CHANGE UP (ref 3.8–10.5)

## 2020-09-16 PROCEDURE — 99231 SBSQ HOSP IP/OBS SF/LOW 25: CPT

## 2020-09-16 RX ORDER — WARFARIN SODIUM 2.5 MG/1
3 TABLET ORAL ONCE
Refills: 0 | Status: COMPLETED | OUTPATIENT
Start: 2020-09-16 | End: 2020-09-16

## 2020-09-16 RX ADMIN — Medication 650 MILLIGRAM(S): at 10:15

## 2020-09-16 RX ADMIN — SIMETHICONE 80 MILLIGRAM(S): 80 TABLET, CHEWABLE ORAL at 06:23

## 2020-09-16 RX ADMIN — Medication 1 MILLIGRAM(S): at 08:23

## 2020-09-16 RX ADMIN — SENNA PLUS 2 TABLET(S): 8.6 TABLET ORAL at 21:23

## 2020-09-16 RX ADMIN — MIRTAZAPINE 7.5 MILLIGRAM(S): 45 TABLET, ORALLY DISINTEGRATING ORAL at 21:23

## 2020-09-16 RX ADMIN — Medication 325 MILLIGRAM(S): at 13:16

## 2020-09-16 RX ADMIN — FINASTERIDE 5 MILLIGRAM(S): 5 TABLET, FILM COATED ORAL at 08:23

## 2020-09-16 RX ADMIN — PREGABALIN 1000 MICROGRAM(S): 225 CAPSULE ORAL at 08:22

## 2020-09-16 RX ADMIN — Medication 325 MILLIGRAM(S): at 06:20

## 2020-09-16 RX ADMIN — WARFARIN SODIUM 3 MILLIGRAM(S): 2.5 TABLET ORAL at 21:24

## 2020-09-16 RX ADMIN — AMIODARONE HYDROCHLORIDE 200 MILLIGRAM(S): 400 TABLET ORAL at 06:21

## 2020-09-16 RX ADMIN — Medication 25 MILLIGRAM(S): at 06:21

## 2020-09-16 RX ADMIN — LISINOPRIL 2.5 MILLIGRAM(S): 2.5 TABLET ORAL at 17:21

## 2020-09-16 RX ADMIN — LISINOPRIL 2.5 MILLIGRAM(S): 2.5 TABLET ORAL at 06:20

## 2020-09-16 RX ADMIN — SIMETHICONE 80 MILLIGRAM(S): 80 TABLET, CHEWABLE ORAL at 16:34

## 2020-09-16 RX ADMIN — Medication 325 MILLIGRAM(S): at 21:23

## 2020-09-16 RX ADMIN — Medication 650 MILLIGRAM(S): at 11:16

## 2020-09-16 RX ADMIN — Medication 5 DROP(S): at 17:21

## 2020-09-16 RX ADMIN — Medication 5 DROP(S): at 06:23

## 2020-09-16 RX ADMIN — SPIRONOLACTONE 25 MILLIGRAM(S): 25 TABLET, FILM COATED ORAL at 06:21

## 2020-09-16 RX ADMIN — PANTOPRAZOLE SODIUM 40 MILLIGRAM(S): 20 TABLET, DELAYED RELEASE ORAL at 08:23

## 2020-09-16 RX ADMIN — SIMETHICONE 80 MILLIGRAM(S): 80 TABLET, CHEWABLE ORAL at 21:27

## 2020-09-16 RX ADMIN — POLYETHYLENE GLYCOL 3350 17 GRAM(S): 17 POWDER, FOR SOLUTION ORAL at 06:22

## 2020-09-16 NOTE — PROGRESS NOTE ADULT - PROBLEM SELECTOR PLAN 3
Re-admitted with H/H 6.3/21.9 s/p 2uPRBC last on 9/8  States that he had BRBPR at home on 9/7  No GI intervention/scope needed at this time  HCT 33 today  If Hct trends down or further GI bleeding, plan to re-scope

## 2020-09-16 NOTE — PROGRESS NOTE ADULT - ASSESSMENT
66 yo M with PMH of AHA stage D HF 2/2 NICM s/p LVAD 2017 on Coumadin, paroxysmal a fib on amiodarone, history of TV annuloplasty, anemia 2/2 recurrent GI bleed, SIADH and CKD3 who presented with recurrent fall in setting of possible syncope. Now s/p ILR insertion 9/15    #Syncope  - Two admissions for falls in last six months, most recent episode of unclear etiology without clear prodrome  - s/p ILR 9/15 to evaluate for ventricular arrythmia possibly leading to underfilling of LV leading to syncope    #NSVT  - Noted on telemetry 9/8/20, none documented since  - Continue metoprolol and amiodarone for ectopy suppression  - Continue monitoring in future via ILR    #Paroxysmal a fib  - On metoprolol succinate 25mg daily  - On amiodarone 200mg daily  - On Coumadin for anticoagulation    EP to sign off, please reconsult if additional questions or patient has clinical change.  Patient to be staffed with attending. Please await attending addendum.    Vale Fierro MD  Cardiology Fellow  [phone]  All Cardiology service information can be found 24/7 on [email or web address], password: [email or web address] 66 yo M with PMH of AHA stage D HF 2/2 NICM s/p LVAD 2017 on Coumadin, paroxysmal a fib on amiodarone, history of TV annuloplasty, anemia 2/2 recurrent GI bleed, SIADH and CKD3 who presented with recurrent fall in setting of possible syncope. Now s/p ILR insertion 9/15    #Syncope  - Two admissions for falls in last six months, most recent episode of unclear etiology without clear prodrome  - s/p ILR 9/15 to evaluate for ventricular arrythmia possibly leading to underfilling of LV leading to syncope    #NSVT  - Noted on telemetry 9/8/20, none documented since  - Continue metoprolol and amiodarone for ectopy suppression  - Continue monitoring in future via ILR    #Paroxysmal a fib  - On metoprolol succinate 25mg daily  - On amiodarone 200mg daily  - On Coumadin for anticoagulation    EP to sign off, please reconsult if additional questions or patient has clinical change. Follow up on 9/28/20 at 11:20 AM for device check.  Patient to be staffed with attending. Please await attending addendum.    Vale Fierro MD  Cardiology Fellow  [phone]  All Cardiology service information can be found 24/7 on [email or web address], password: [email or web address]

## 2020-09-16 NOTE — PROGRESS NOTE ADULT - SUBJECTIVE AND OBJECTIVE BOX
Interval History: s/p successful implantation of ILR yesterday, no recurrent syncope since admission    Review Of Systems:  Constitutional: [ ] Fever [ ] Chills [ ] Fatigue [ ] Weight change   HEENT: [ ] Blurred vision [ ] Eye Pain [x] Headache [ ] Runny nose [ ] Sore Throat   Respiratory: [ ] Cough [ ] Wheezing [ ] Shortness of breath  Cardiovascular: [ ] Chest Pain [ ] Palpitations [ ] PEÑA [ ] PND [ ] Orthopnea  Gastrointestinal: [ ] Abdominal Pain [ ] Diarrhea [ ] Constipation [ ] Hemorrhoids [ ] Nausea [ ] Vomiting  Genitourinary: [ ] Nocturia [ ] Dysuria [ ] Incontinence  Extremities: [ ] Swelling [ ] Joint Pain  Neurologic: [ ] Focal deficit [ ] Paresthesias [ ] Syncope  Lymphatic: [ ] Swelling [ ] Lymphadenopathy   Skin: [ ] Rash [ ] Ecchymoses [ ] Wounds [ ] Lesions  Psychiatry: [ ] Depression [ ] Suicidal/Homicidal Ideation [ ] Anxiety [ ] Sleep Disturbances  [x] 10 point review of systems is otherwise negative except as mentioned above    Medications:  acetaminophen   Tablet .. 650 milliGRAM(s) Oral every 6 hours PRN  aMIOdarone    Tablet 200 milliGRAM(s) Oral daily  cyanocobalamin 1000 MICROGram(s) Oral daily  ferrous    sulfate 325 milliGRAM(s) Oral three times a day  finasteride 5 milliGRAM(s) Oral daily  folic acid 1 milliGRAM(s) Oral daily  influenza   Vaccine 0.5 milliLiter(s) IntraMuscular once  lidocaine   Patch 1 Patch Transdermal every 24 hours  lisinopril 2.5 milliGRAM(s) Oral two times a day  metoprolol succinate ER 25 milliGRAM(s) Oral daily  mirtazapine 7.5 milliGRAM(s) Oral at bedtime  ofloxacin 0.3% Solution 5 Drop(s) Left Ear two times a day  oxyCODONE    IR 2.5 milliGRAM(s) Oral four times a day PRN  pantoprazole    Tablet 40 milliGRAM(s) Oral daily  polyethylene glycol 3350 17 Gram(s) Oral two times a day  senna 2 Tablet(s) Oral at bedtime  simethicone 80 milliGRAM(s) Chew three times a day  spironolactone 25 milliGRAM(s) Oral daily    Vitals:  ICU Vital Signs Last 24 Hrs  T(C): 36.7 (16 Sep 2020 10:14), Max: 36.8 (15 Sep 2020 22:10)  T(F): 98.1 (16 Sep 2020 10:14), Max: 98.3 (15 Sep 2020 22:10)  HR: 78 (16 Sep 2020 10:14) (78 - 100)  BP: 88/68 (15 Sep 2020 20:28) (84/71 - 88/68)  BP(mean): 72 (16 Sep 2020 10:14) (72 - 80)  ABP: --  ABP(mean): --  RR: 19 (16 Sep 2020 10:14) (17 - 19)  SpO2: 96% (16 Sep 2020 10:14) (96% - 98%)    Daily     Daily Weight in k.1 (16 Sep 2020 08:09)  I&O's Summary    15 Sep 2020 07:01  -  16 Sep 2020 07:00  --------------------------------------------------------  IN: 1130 mL / OUT: 1000 mL / NET: 130 mL    16 Sep 2020 07:01  -  16 Sep 2020 10:38  --------------------------------------------------------  IN: 598 mL / OUT: 0 mL / NET: 598 mL    Physical Exam:  Appearance:  NAD, laying flat in bed without increased work of breathing  HENT: Nontraumatic, R conjunctival hemorrhage improving  Cardiovascular: LVAD hum, no LE Edema Present  Chest: ILR insertion site C/D/I without hematoma  Respiratory: Clear to auscultation bilaterally anteriorly  Gastrointestinal: Soft  Psychiatry: [x] AAOx3  [x] Follows Commands  Skin: Intact    Labs:                        10.2   9.17  )-----------( 308      ( 16 Sep 2020 07:12 )             33.1     09-16    131<L>  |  97  |  24<H>  ----------------------------<  93  4.7   |  21<L>  |  1.31<H>    Ca    9.4      16 Sep 2020 07:09    TPro  7.3  /  Alb  3.9  /  TBili  0.2  /  DBili  x   /  AST  20  /  ALT  21  /  AlkPhos  70  09-16    PT/INR - ( 16 Sep 2020 07:12 )   PT: 16.3 sec;   INR: 1.39 ratio      PTT - ( 16 Sep 2020 07:12 )  PTT:31.3 sec    Interpretation of Telemetry: sinus rhythm, infrequent PVCs

## 2020-09-16 NOTE — PROGRESS NOTE ADULT - PROBLEM SELECTOR PLAN 1
CHF following   Continue on Lisinopril 2.5 mg PO daily   Continue on Lopressor 25 mg PO daily   Continue on amio 200 mg PO daily   Strict LOS's   Continue on Aldactone 25 mg PO daily   Coumadin 3.0 mg tonight-- INR 1.39 (goal 1.8-2.0)  HF team following  Plan for BROOKS

## 2020-09-16 NOTE — PROGRESS NOTE ADULT - PROBLEM SELECTOR PLAN 2
s/p fall, rt facial edema  follow up ENT as outpt  CT of IAC shows acute posteriorly displaced fracture of the anterior wall of the left external auditory canal with partial opacification of the external auditory canal.  Left ear cauterized and packed with surgicel x 1, packing removed by ENT 9/10

## 2020-09-16 NOTE — PROGRESS NOTE ADULT - SUBJECTIVE AND OBJECTIVE BOX
VITAL SIGNS    Subjective: "I'm feeling ok." Denies CP, palpitation, SOB, PEÑA, HA, dizziness, N/V/D, fever or chills.  No acute event noted overnight.     Telemetry: NSR       Vital Signs Last 24 Hrs  T(C): 36.7 (20 @ 10:14), Max: 36.8 (09-15-20 @ 22:10)  T(F): 98.1 (20 @ 10:14), Max: 98.3 (09-15-20 @ 22:10)  HR: 78 (20 @ 10:14) (78 - 100)  BP: 88/68 (09-15-20 @ 20:28) (84/71 - 88/68)  RR: 19 (20 @ 10:14) (17 - 19)  SpO2: 96% (20 @ 10:14) (96% - 98%)          09-15 @ 07:01  -   @ 07:00  --------------------------------------------------------  IN: 1130 mL / OUT: 1000 mL / NET: 130 mL     @ 07:01  -   @ 11:24  --------------------------------------------------------  IN: 598 mL / OUT: 0 mL / NET: 598 mL    Daily     Daily Weight in k.1 (16 Sep 2020 08:09)    CAPILLARY BLOOD GLUCOSE    POCT Blood Glucose.: 97 mg/dL (16 Sep 2020 07:49)                      PHYSICAL EXAM    Neurology: alert and oriented x 3, nonfocal, no gross deficits    CV: (+) LVAD hum     Lungs: CTA B/L     Abdomen: soft, nontender, nondistended, positive bowel sounds, (+) Flatus; (+) BM; RLQ driveline site with occlusive dressing C/D/I      :  Voiding               Extremities:  B/L LE (+) trace edema; negative calf tenderness        acetaminophen Tablet .. 650 milliGRAM(s) Oral every 6 hours PRN  aMIOdarone  Tablet 200 milliGRAM(s) Oral daily  cyanocobalamin 1000 MICROGram(s) Oral daily  ferrous  sulfate 325 milliGRAM(s) Oral three times a day  finasteride 5 milliGRAM(s) Oral daily  folic acid 1 milliGRAM(s) Oral daily  influenza Vaccine 0.5 milliLiter(s) IntraMuscular once  lidocaine Patch 1 Patch Transdermal every 24 hours  lisinopril 2.5 milliGRAM(s) Oral two times a day  metoprolol succinate ER 25 milliGRAM(s) Oral daily  mirtazapine 7.5 milliGRAM(s) Oral at bedtime  ofloxacin 0.3% Solution 5 Drop(s) Left Ear two times a day  oxyCODONE IR 2.5 milliGRAM(s) Oral four times a day PRN  pantoprazole Tablet 40 milliGRAM(s) Oral daily  polyethylene glycol 3350 17 Gram(s) Oral two times a day  senna 2 Tablet(s) Oral at bedtime  simethicone 80 milliGRAM(s) Chew three times a day  spironolactone 25 milliGRAM(s) Oral daily    Physical Therapy Rec:   Home  [  ]   Home w/ PT  [ X ]  Rehab  [  ]    Discussed with Cardiothoracic Team at AM rounds.

## 2020-09-16 NOTE — PROGRESS NOTE ADULT - ASSESSMENT
65M PMH HF due to NICM HM2 LVAD (2017) on coumadin, TV annuloplasty ring, SIADH, CKD3, presenting after fall. Patient reports he was reaching for ensure in the refrigerator and fell onto the floor. Patient does not remember falling, questionably sustained a syncopal event, reports being helped by his friend who he lives with to his bed, who then called a taxi to bring him to the ED. Does not recall additional details of events. Currently complaining of pain associated with swelling on is face, as well as pain on his left flank/abdomen.     INTERVAL EVENTS: Mentating at baseline in ED, admitted to thoracic surgery for correction of supratherapeutic INR and resuscitation for anemia. CT max-face and CT internal auditory canal revealed acute posterior displaced fracture of anterior wall of left external auditory canal, and right infraorbital soft tissue swelling.   9/8 CT Maxillofacial No Cont (09.07.20 @ 18:41) CT IAC: Acute posteriorly displaced fracture of the anterior wall of the left external auditory canal. Partial opacification of the external auditory canal, likely with blood products. CT maxillofacial: No acute maxillofacial bone fracture. Right facial/infraorbital soft tissue swelling. Plan to Repeat head CT tonight  Pt with bleeding from L ear, ENT evaluating, packed x 2. No surgical intervention. INR up to 3.0, additional FFP x 1  9/9 L ear clot noted, no further active bleeding  CT Head No Cont (09.08.20 @ 17:53) No acute intracranial hemorrhage. New right premaxillary soft tissue hematoma measuring 1.3 x 1.5 x 2.6 cm.  9/10 OOB to chair, HCT 26; creatinine 1.18 on aldactone 25 mg PO daily. Negative 500 cc  MAP 72, right facial edema   9/11 Facial edema improving.  Lisinopril resume yesterday, bp stable, would check orthostatics. PT consulted --> recommendation rec Home PT. Left  ear packing has been removed  9/12 HD stable MAP 76  Creat 1.13.  PT recs now BROOKS.    9/13 Coumadin 2mg for INR 1.72 change diet to mechanical soft diet.   9/14 MAP 72-76. Plan for BROOKS pending placement. Coumadin 3.0 mg tonight for INR 1.42.   9/15 Plan for ILR placement by EP (Dr. Alonso), INR 1.41 --> coumadin 3.0 tonight.   9/16 VVS; continue with current medication regimen per heart failure.    Disposition: Awaiting [facility] facility bed.

## 2020-09-17 DIAGNOSIS — E87.1 HYPO-OSMOLALITY AND HYPONATREMIA: ICD-10-CM

## 2020-09-17 LAB
ANION GAP SERPL CALC-SCNC: 10 MMOL/L — SIGNIFICANT CHANGE UP (ref 5–17)
BUN SERPL-MCNC: 27 MG/DL — HIGH (ref 7–23)
CALCIUM SERPL-MCNC: 9.2 MG/DL — SIGNIFICANT CHANGE UP (ref 8.4–10.5)
CHLORIDE SERPL-SCNC: 98 MMOL/L — SIGNIFICANT CHANGE UP (ref 96–108)
CO2 SERPL-SCNC: 22 MMOL/L — SIGNIFICANT CHANGE UP (ref 22–31)
CREAT SERPL-MCNC: 1.14 MG/DL — SIGNIFICANT CHANGE UP (ref 0.5–1.3)
GLUCOSE SERPL-MCNC: 105 MG/DL — HIGH (ref 70–99)
HCT VFR BLD CALC: 31.4 % — LOW (ref 39–50)
HGB BLD-MCNC: 9.8 G/DL — LOW (ref 13–17)
INR BLD: 1.41 RATIO — HIGH (ref 0.88–1.16)
LDH SERPL L TO P-CCNC: 227 U/L — SIGNIFICANT CHANGE UP (ref 50–242)
MCHC RBC-ENTMCNC: 30.2 PG — SIGNIFICANT CHANGE UP (ref 27–34)
MCHC RBC-ENTMCNC: 31.2 GM/DL — LOW (ref 32–36)
MCV RBC AUTO: 96.9 FL — SIGNIFICANT CHANGE UP (ref 80–100)
NRBC # BLD: 0 /100 WBCS — SIGNIFICANT CHANGE UP (ref 0–0)
PLATELET # BLD AUTO: 299 K/UL — SIGNIFICANT CHANGE UP (ref 150–400)
POTASSIUM SERPL-MCNC: 4.6 MMOL/L — SIGNIFICANT CHANGE UP (ref 3.5–5.3)
POTASSIUM SERPL-SCNC: 4.6 MMOL/L — SIGNIFICANT CHANGE UP (ref 3.5–5.3)
PROTHROM AB SERPL-ACNC: 16.5 SEC — HIGH (ref 10.6–13.6)
RBC # BLD: 3.24 M/UL — LOW (ref 4.2–5.8)
RBC # FLD: 18.2 % — HIGH (ref 10.3–14.5)
SODIUM SERPL-SCNC: 130 MMOL/L — LOW (ref 135–145)
WBC # BLD: 7.64 K/UL — SIGNIFICANT CHANGE UP (ref 3.8–10.5)
WBC # FLD AUTO: 7.64 K/UL — SIGNIFICANT CHANGE UP (ref 3.8–10.5)

## 2020-09-17 PROCEDURE — 99231 SBSQ HOSP IP/OBS SF/LOW 25: CPT

## 2020-09-17 PROCEDURE — 99233 SBSQ HOSP IP/OBS HIGH 50: CPT | Mod: 25

## 2020-09-17 PROCEDURE — 93750 INTERROGATION VAD IN PERSON: CPT

## 2020-09-17 PROCEDURE — 99222 1ST HOSP IP/OBS MODERATE 55: CPT

## 2020-09-17 RX ORDER — WARFARIN SODIUM 2.5 MG/1
5 TABLET ORAL ONCE
Refills: 0 | Status: COMPLETED | OUTPATIENT
Start: 2020-09-17 | End: 2020-09-17

## 2020-09-17 RX ADMIN — WARFARIN SODIUM 5 MILLIGRAM(S): 2.5 TABLET ORAL at 22:14

## 2020-09-17 RX ADMIN — PANTOPRAZOLE SODIUM 40 MILLIGRAM(S): 20 TABLET, DELAYED RELEASE ORAL at 11:42

## 2020-09-17 RX ADMIN — FINASTERIDE 5 MILLIGRAM(S): 5 TABLET, FILM COATED ORAL at 11:42

## 2020-09-17 RX ADMIN — Medication 325 MILLIGRAM(S): at 05:41

## 2020-09-17 RX ADMIN — PREGABALIN 1000 MICROGRAM(S): 225 CAPSULE ORAL at 11:42

## 2020-09-17 RX ADMIN — Medication 5 DROP(S): at 05:48

## 2020-09-17 RX ADMIN — SIMETHICONE 80 MILLIGRAM(S): 80 TABLET, CHEWABLE ORAL at 05:40

## 2020-09-17 RX ADMIN — SIMETHICONE 80 MILLIGRAM(S): 80 TABLET, CHEWABLE ORAL at 22:15

## 2020-09-17 RX ADMIN — Medication 325 MILLIGRAM(S): at 12:10

## 2020-09-17 RX ADMIN — SPIRONOLACTONE 25 MILLIGRAM(S): 25 TABLET, FILM COATED ORAL at 05:42

## 2020-09-17 RX ADMIN — Medication 325 MILLIGRAM(S): at 22:14

## 2020-09-17 RX ADMIN — Medication 1 MILLIGRAM(S): at 11:42

## 2020-09-17 RX ADMIN — AMIODARONE HYDROCHLORIDE 200 MILLIGRAM(S): 400 TABLET ORAL at 05:41

## 2020-09-17 RX ADMIN — Medication 25 MILLIGRAM(S): at 05:41

## 2020-09-17 RX ADMIN — MIRTAZAPINE 7.5 MILLIGRAM(S): 45 TABLET, ORALLY DISINTEGRATING ORAL at 22:15

## 2020-09-17 RX ADMIN — SENNA PLUS 2 TABLET(S): 8.6 TABLET ORAL at 22:14

## 2020-09-17 NOTE — PROGRESS NOTE ADULT - PROBLEM SELECTOR PLAN 2
- Continue home Metoprolol succinate 25 mg daily  - Continue home Aldactone 25 mg daily.  - Discontinue Lisinopril today to allow improvement in blood pressures given hx of falls. MAPs currently 70-78

## 2020-09-17 NOTE — PROGRESS NOTE ADULT - ASSESSMENT
65M PMH HF due to NICM HM2 LVAD (2017) on coumadin, TV annuloplasty ring, SIADH, CKD3, presenting after fall. Patient reports he was reaching for ensure in the refrigerator and fell onto the floor. Patient does not remember falling, questionably sustained a syncopal event, reports being helped by his friend who he lives with to his bed, who then called a taxi to bring him to the ED. Does not recall additional details of events. Currently complaining of pain associated with swelling on is face, as well as pain on his left flank/abdomen.     INTERVAL EVENTS: Mentating at baseline in ED, admitted to thoracic surgery for correction of supratherapeutic INR and resuscitation for anemia. CT max-face and CT internal auditory canal revealed acute posterior displaced fracture of anterior wall of left external auditory canal, and right infraorbital soft tissue swelling.   9/8 CT Maxillofacial No Cont (09.07.20 @ 18:41) CT IAC: Acute posteriorly displaced fracture of the anterior wall of the left external auditory canal. Partial opacification of the external auditory canal, likely with blood products. CT maxillofacial: No acute maxillofacial bone fracture. Right facial/infraorbital soft tissue swelling. Plan to Repeat head CT tonight  Pt with bleeding from L ear, ENT evaluating, packed x 2. No surgical intervention. INR up to 3.0, additional FFP x 1  9/9 L ear clot noted, no further active bleeding  CT Head No Cont (09.08.20 @ 17:53) No acute intracranial hemorrhage. New right premaxillary soft tissue hematoma measuring 1.3 x 1.5 x 2.6 cm.  9/10 OOB to chair, HCT 26; creatinine 1.18 on aldactone 25 mg PO daily. Negative 500 cc  MAP 72, right facial edema   9/11 Facial edema improving.  Lisinopril resume yesterday, bp stable, would check orthostatics. PT consulted --> recommendation rec Home PT. Left  ear packing has been removed  9/12 HD stable MAP 76  Creat 1.13.  PT recs now BROOKS.    9/13 Coumadin 2mg for INR 1.72 change diet to mechanical soft diet.   9/14 MAP 72-76. Plan for BROOKS pending placement. Coumadin 3.0 mg tonight for INR 1.42.   9/15 Plan for ILR placement by EP (Dr. Alonso), INR 1.41 --> coumadin 3.0 tonight.   9/16 VVS; continue with current medication regimen per heart failure.    9/17 MAP --> Lisinopril D/C'd.  Patient with c/o chronic left sided abdominal / pelvic pain --> Chronic pain management consult called Disposition: Awaiting [facility] facility bed. 65M PMH HF due to NICM HM2 LVAD (2017) on coumadin, TV annuloplasty ring, SIADH, CKD3, presenting after fall. Patient reports he was reaching for ensure in the refrigerator and fell onto the floor. Patient does not remember falling, questionably sustained a syncopal event, reports being helped by his friend who he lives with to his bed, who then called a taxi to bring him to the ED. Does not recall additional details of events. Currently complaining of pain associated with swelling on is face, as well as pain on his left flank/abdomen.     INTERVAL EVENTS: Mentating at baseline in ED, admitted to thoracic surgery for correction of supratherapeutic INR and resuscitation for anemia. CT max-face and CT internal auditory canal revealed acute posterior displaced fracture of anterior wall of left external auditory canal, and right infraorbital soft tissue swelling.   9/8 CT Maxillofacial No Cont (09.07.20 @ 18:41) CT IAC: Acute posteriorly displaced fracture of the anterior wall of the left external auditory canal. Partial opacification of the external auditory canal, likely with blood products. CT maxillofacial: No acute maxillofacial bone fracture. Right facial/infraorbital soft tissue swelling. Plan to Repeat head CT tonight  Pt with bleeding from L ear, ENT evaluating, packed x 2. No surgical intervention. INR up to 3.0, additional FFP x 1  9/9 L ear clot noted, no further active bleeding  CT Head No Cont (09.08.20 @ 17:53) No acute intracranial hemorrhage. New right premaxillary soft tissue hematoma measuring 1.3 x 1.5 x 2.6 cm.  9/10 OOB to chair, HCT 26; creatinine 1.18 on aldactone 25 mg PO daily. Negative 500 cc  MAP 72, right facial edema   9/11 Facial edema improving.  Lisinopril resume yesterday, bp stable, would check orthostatics. PT consulted --> recommendation rec Home PT. Left  ear packing has been removed  9/12 HD stable MAP 76  Creat 1.13.  PT recs now BROOKS.    9/13 Coumadin 2mg for INR 1.72 change diet to mechanical soft diet.   9/14 MAP 72-76. Plan for BROOKS pending placement. Coumadin 3.0 mg tonight for INR 1.42.   9/15 Plan for ILR placement by EP (Dr. Alonso), INR 1.41 --> coumadin 3.0 tonight.   9/16 VVS; continue with current medication regimen per heart failure.    9/17 MAP 70's --> Lisinopril D/C'd.  Patient with c/o chronic left sided abdominal / pelvic pain --> Chronic pain management consult called Disposition: Awaiting [facility] facility bed.

## 2020-09-17 NOTE — PROGRESS NOTE ADULT - SUBJECTIVE AND OBJECTIVE BOX
VITAL SIGNS    Subjective: "I'm feeling ok." Denies CP, palpitation, SOB, PEÑA, HA, dizziness, N/V/D, fever or chills.  No acute event noted overnight.     Telemetry:  NSR 80     Vital Signs Last 24 Hrs  T(C): 37.1 (20 @ 09:47), Max: 37.1 (20 @ 09:47)  T(F): 98.8 (20 @ 09:47), Max: 98.8 (20 @ 09:47)  HR: 78 (20 @ :47) (78 - 83)  BP: 83/68 (20 @ 20:47) (83/68 - 83/68)  RR: 18 (20 @ :47) (18 - 19)  SpO2: 98% (20 @ 09:47) (98% - 98%)            @ 07:01  -   @ 07:00  --------------------------------------------------------  IN: 1198 mL / OUT: 800 mL / NET: 398 mL     @ 07:01  -   @ 12:53  --------------------------------------------------------  IN: 0 mL / OUT: 300 mL / NET: -300 mL    Daily     Daily Weight in k.4 (17 Sep 2020 08:34)    PHYSICAL EXAM    Neurology: alert and oriented x 3, nonfocal, no gross deficits    CV: (+) LVAD hum      Lungs: CTA B/L     Abdomen: soft, nontender, nondistended, positive bowel sounds, (+) Flatus; (+) BM. RLQ driveline site with occlusive dressing C/D/I      :  Voiding               Extremities:  B/L LE (+) edema; negative calf tenderness; (+) 2 DP palpable;         acetaminophen Tablet .. 650 milliGRAM(s) Oral every 6 hours PRN  aMIOdarone Tablet 200 milliGRAM(s) Oral daily  cyanocobalamin 1000 MICROGram(s) Oral daily  ferrous sulfate 325 milliGRAM(s) Oral three times a day  finasteride 5 milliGRAM(s) Oral daily  folic acid 1 milliGRAM(s) Oral daily  influenza Vaccine 0.5 milliLiter(s) IntraMuscular once  lidocaine Patch 1 Patch Transdermal every 24 hours  metoprolol succinate ER 25 milliGRAM(s) Oral daily  mirtazapine 7.5 milliGRAM(s) Oral at bedtime  pantoprazole Tablet 40 milliGRAM(s) Oral daily  polyethylene glycol 3350 17 Gram(s) Oral two times a day  senna 2 Tablet(s) Oral at bedtime  simethicone 80 milliGRAM(s) Chew three times a day  sodium chloride 0.9% Bolus 500 milliLiter(s) IV Bolus once  spironolactone 25 milliGRAM(s) Oral daily  warfarin 5 milliGRAM(s) Oral once    Physical Therapy Rec:   Home  [  ]   Home w/ PT  [  ]  Rehab  [ X ]    Discussed with Cardiothoracic Team at AM rounds.

## 2020-09-17 NOTE — CONSULT NOTE ADULT - CONSULT REQUESTED DATE/TIME
08-Sep-2020 00:03
08-Sep-2020 00:44
09-Sep-2020 08:42
17-Sep-2020 16:04
14-Sep-2020 16:05
08-Sep-2020 09:06

## 2020-09-17 NOTE — CONSULT NOTE ADULT - CONSULT REASON
Left Ear Hemorrhage s/p Fall.
anemia, gi bleed
left abdominal pain
trauma consult
Possible syncope, evaluate for ILR
LVAD re-admission

## 2020-09-17 NOTE — PROGRESS NOTE ADULT - PROBLEM SELECTOR PLAN 6
- complains of LUQ and LLQ discomfort. Mild tenderness noted on exam, likely related to fall  - KUB 9/10 shows non-obstructive gas pattern, Abd u/s normal, CT abdomen with contrast without pathology and recent endoscopy without pathology. There is no signs of acute pathology.   - LFT, Amylase, Lipase all WNL  - Continue Simethicone 80 mg TID for gas discomfort  - Chronic pain consulted, awaiting further recommendations

## 2020-09-17 NOTE — PROGRESS NOTE ADULT - PROBLEM SELECTOR PLAN 1
CHF following   D/C Lisinopril 2.5 mg PO daily   Continue on Lopressor 25 mg PO daily   Continue on amio 200 mg PO daily   Strict LOS's   Continue on Aldactone 25 mg PO daily   Coumadin 5 mg tonight-- INR 1.39 (goal 1.8-2.0)  HF team following  Plan for [facility]

## 2020-09-17 NOTE — PROGRESS NOTE ADULT - PROBLEM SELECTOR PLAN 7
- hx of syncope without known cause. has required multiple hospitalizations in the setting of low hemoglobin and elevated INR.   - Given patients advanced heart failure and substrate for VT, EP consulted and underwent ILR implant (9/15).  - Orthostatics negative - hx of syncope without known cause. has required multiple hospitalizations in the setting of low hemoglobin and elevated INR.   - Given patients advanced heart failure and substrate for VT, EP consulted, underwent ILR implant (9/15).  - Orthostatics negative

## 2020-09-17 NOTE — CONSULT NOTE ADULT - SUBJECTIVE AND OBJECTIVE BOX
Chief Complaint:  Patient is a 65y old  Male who presents with a chief complaint of Fall.    HPI:  65M PMH HF due to NICM HM2 LVAD (2017) on coumadin, TV annuloplasty ring, SIADH, CKD3, presenting after fall. Patient reports he was reaching for ensure in the refrigerator and fell onto the floor. Patient does not remember falling , questionably sustained a syncopal event, reports being helped by his friend who he lives with to his bed, who then called a taxi to bring him to the ED.  Currently complaining of pain associated with swelling on is face, as well as pain on his left flank/abdomen.   Called to see pt for the left sided abdominal pain, which has been worked up without conclusion.       Current Pain Score: 8/ 10    Current out- patient pain regimen: none    Out Patient Pain Management provider: none    [facility] Prescription Monitoring Program: Reference #[id number]    PAST MEDICAL & SURGICAL HISTORY:  GI bleed    Vertigo    H/O epistaxis    Iron deficiency anemia    CKD (chronic kidney disease), stage III    Clavicle fracture    Falls    Anticoagulation goal of INR 2.0 to 2.5    ACC/AHA stage D systolic heart failure    BPH with urinary obstruction    Claudication    Peripheral arterial disease    Bleeding hemorrhoids    Hemorrhoids    History of 2019 novel coronavirus disease (COVID-19)  april 2020    Pleural effusion    Depression    CAD (coronary artery disease)    CHF (congestive heart failure)    S/P endoscopy    S/P ventricular assist device    S/P TVR (tricuspid valve repair)      FAMILY HISTORY:  No pertinent family history in first degree relatives      SOCIAL HISTORY:  Tobacco Use: quit 3 years ago  Alcohol Use: quit 3 years ago  Illcicit Drug Use: denies     Opioid Risk Tool (ORT-OUD) Score:  low    Allergies    No Known Allergies    Intolerances    none    REVIEW OF SYSTEMS:  CONSTITUTIONAL: No fever, weight loss, fatigue,  + falls  NEURO: + headaches; no memory loss, tremors, dizziness or blurred vision  RESP: No shortness of breath, cough  CV: No chest pain, palpitations  GI: + left upper and lower quadrant abdominal pain; no nausea, vomiting, diarrhea, constipation; + bloody stools   : no dysuria, hematuria, bladder incontinence/retention  MSK: No joint pain or swelling; No muscle, back, or extremity pain, no upper or lower motor strength weakness, no saddle anesthesia   SKIN: No itching, burning, rashes; + right eye/ face ecchymosis   PSYCHIATRIC: No depression, anxiety, mood swings; + difficulty sleeping      MEDICATIONS  (STANDING):  aMIOdarone    Tablet 200 milliGRAM(s) Oral daily  cyanocobalamin 1000 MICROGram(s) Oral daily  ferrous    sulfate 325 milliGRAM(s) Oral three times a day  finasteride 5 milliGRAM(s) Oral daily  folic acid 1 milliGRAM(s) Oral daily  influenza   Vaccine 0.5 milliLiter(s) IntraMuscular once  lidocaine   Patch 1 Patch Transdermal every 24 hours  metoprolol succinate ER 25 milliGRAM(s) Oral daily  mirtazapine 7.5 milliGRAM(s) Oral at bedtime  pantoprazole    Tablet 40 milliGRAM(s) Oral daily  polyethylene glycol 3350 17 Gram(s) Oral two times a day  senna 2 Tablet(s) Oral at bedtime  simethicone 80 milliGRAM(s) Chew three times a day  spironolactone 25 milliGRAM(s) Oral daily  warfarin 5 milliGRAM(s) Oral once    MEDICATIONS  (PRN):  acetaminophen   Tablet .. 650 milliGRAM(s) Oral every 6 hours PRN Temp greater or equal to 38.5C (101.3F), Mild Pain (1 - 3)      PHYSICAL EXAM  GENERAL: seen at bedside, sitting in chair; NAD; well developed, well groomed; no signs of toxicity  HEENT: R facial trauma s/p fall, normocephalic; PERRLA; EOMs intact; tongue midline; speech clear and fluent  NEURO: A + O X 3; good concentration; Cranial Nerves II- XII intact; sensory exam  RESPIRATORY: lungs clear to auscultation B/L; no rales or rhonchi; chest expansion equal  CARDIAC: regular cardiac rhythm; S1 S2; no JVD; + LVAD hum  GI: abdomen soft, non distended; + bowel sounds; no organomegaly; - Rovsing sign; - Iliopsoas sign; no allodynia or hyperasthesia    : voiding  EXTREMITIES/ PV: BLANDON equally; 2+ peripheral pulses; no cyanosis, edema or clubbing  MUSCULOSKELETAL: motor strength 5/5 B/L lower extremities; gait not assessed  SKIN: no rashes, lesions   PSYCH: affect normal; good eye contact; no signs of depression or anxiety    Vital Signs:  T(C): 37.1 (09-17-20 @ 14:00)  HR: 74 (09-17-20 @ 14:00)  BP: 83/68 (09-16-20 @ 20:47)  RR: 18 (09-17-20 @ 14:00)  SpO2: 96% (09-17-20 @ 14:00)    Pertinent labs/radiology:                        9.8    7.64  )-----------( 299      ( 17 Sep 2020 05:38 )             31.4   09-17    130<L>  |  98  |  27<H>  ----------------------------<  105<H>  4.6   |  22  |  1.14    Ca    9.2      17 Sep 2020 05:38    TPro  7.3  /  Alb  3.9  /  TBili  0.2  /  DBili  x   /  AST  20  /  ALT  21  /  AlkPhos  70  09-16

## 2020-09-17 NOTE — PROGRESS NOTE ADULT - SUBJECTIVE AND OBJECTIVE BOX
Subjective: Patient seen and examined resting in bed. ON no issues. Awaiting recs from chronic pain    Medications:  acetaminophen   Tablet .. 650 milliGRAM(s) Oral every 6 hours PRN  aMIOdarone    Tablet 200 milliGRAM(s) Oral daily  cyanocobalamin 1000 MICROGram(s) Oral daily  ferrous    sulfate 325 milliGRAM(s) Oral three times a day  finasteride 5 milliGRAM(s) Oral daily  folic acid 1 milliGRAM(s) Oral daily  influenza   Vaccine 0.5 milliLiter(s) IntraMuscular once  lidocaine   Patch 1 Patch Transdermal every 24 hours  metoprolol succinate ER 25 milliGRAM(s) Oral daily  mirtazapine 7.5 milliGRAM(s) Oral at bedtime  pantoprazole    Tablet 40 milliGRAM(s) Oral daily  polyethylene glycol 3350 17 Gram(s) Oral two times a day  senna 2 Tablet(s) Oral at bedtime  simethicone 80 milliGRAM(s) Chew three times a day  spironolactone 25 milliGRAM(s) Oral daily  warfarin 5 milliGRAM(s) Oral once    Vitals:  Vital Signs Last 24 Hours  T(C): 36.9 (20 @ 05:37), Max: 36.9 (20 @ 05:37)  HR: 82 (20 @ 05:37) (78 - 83)  BP: 83/68 (20 @ 20:47) (83/68 - 83/68)  RR: 18 (20 @ 05:37) (18 - 19)  SpO2: 98% (20 @ 05:37) (96% - 98%)    Weight in k.1 ( @ 08:09)    I&O's Summary    16 Sep 2020 07:01  -  17 Sep 2020 07:00  --------------------------------------------------------  IN: 1198 mL / OUT: 800 mL / NET: 398 mL      Physical Exam  General: No distress. Comfortable.  HEENT: Right facial swelling along with infra orbital ecchymosis (slowly improving)  Neck: Neck supple. No JVP noted. No masses  Chest: Clear to auscultation bilaterally  CV: +VAD hum  Abdomen: Soft, non-distended, mild left sided tenderness without rebound or guarding  Skin: No rashes or skin breakdown  Neurology: Alert and oriented times three. Sensation intact    LVAD Interrogation  VAD TYPE HM 2  Speed 8800  Flow 4.6  Power 4.9  PI  5.8  Assessment of driveline exit site: driveline dressing c/d/i  Programming changes: no changes made     Labs:                        9.8    7.64  )-----------( 299      ( 17 Sep 2020 05:38 )             31.4         130<L>  |  98  |  27<H>  ----------------------------<  105<H>  4.6   |  22  |  1.14    Ca    9.2      17 Sep 2020 05:38    TPro  7.3  /  Alb  3.9  /  TBili  0.2  /  DBili  x   /  AST  20  /  ALT  21  /  AlkPhos  70      PT/INR - ( 17 Sep 2020 05:38 )   PT: 16.5 sec;   INR: 1.41 ratio         PTT - ( 16 Sep 2020 07:12 )  PTT:31.3 sec      Lactate Dehydrogenase, Serum: 227 U/L ( @ 05:38)  Lactate Dehydrogenase, Serum: 241 U/L ( @ 07:09)  Lactate Dehydrogenase, Serum: 224 U/L (09-15 @ 06:11)

## 2020-09-17 NOTE — PROGRESS NOTE ADULT - PROBLEM SELECTOR PLAN 3
- Continue at current speed (8800)  - Re-admitted with supra-therapeutic INR of 5.14, s/p 2unts of FFP (last on 9/8).   - Continue dosing Coumadin for anticoagulation (INR goal 2-2.5). Will dose 5 mg tonight  - Continue to hold ASA for recurrent bleeds   - Continue to monitor LDH levels daily.

## 2020-09-17 NOTE — CONSULT NOTE ADULT - ASSESSMENT
65M PMH HF due to NICM HM2 LVAD (2017) on coumadin, TV annuloplasty ring, SIADH, CKD3, presenting after fall. Patient reports he was reaching for ensure in the refrigerator and fell onto the floor. Patient does not remember falling, currently complaining of pain associated with swelling on is face, as well as pain on his left flank/abdomen.      Pt with possible left rectus abdominis and/ or transversus abdominis spasm, possible related to LVAD.  Would consider robaxin 500 mg PO Q 6 hours PRN.  Also consider neurontin 100 mg PO QHS (creatinine clearance 75.5).  Opioids not indicated at this time.    Monitor for sedation, respiratory depression and gait instability.      Minutes spent on total encounter: 45 minutes      Chronic Pain Service  [phone]

## 2020-09-18 ENCOUNTER — TRANSCRIPTION ENCOUNTER (OUTPATIENT)
Age: 65
End: 2020-09-18

## 2020-09-18 VITALS — OXYGEN SATURATION: 98 % | RESPIRATION RATE: 18 BRPM | HEART RATE: 85 BPM | TEMPERATURE: 98 F

## 2020-09-18 LAB
ANION GAP SERPL CALC-SCNC: 10 MMOL/L — SIGNIFICANT CHANGE UP (ref 5–17)
BUN SERPL-MCNC: 22 MG/DL — SIGNIFICANT CHANGE UP (ref 7–23)
CALCIUM SERPL-MCNC: 9.9 MG/DL — SIGNIFICANT CHANGE UP (ref 8.4–10.5)
CHLORIDE SERPL-SCNC: 100 MMOL/L — SIGNIFICANT CHANGE UP (ref 96–108)
CO2 SERPL-SCNC: 23 MMOL/L — SIGNIFICANT CHANGE UP (ref 22–31)
CREAT SERPL-MCNC: 1.16 MG/DL — SIGNIFICANT CHANGE UP (ref 0.5–1.3)
GLUCOSE SERPL-MCNC: 98 MG/DL — SIGNIFICANT CHANGE UP (ref 70–99)
HCT VFR BLD CALC: 34.8 % — LOW (ref 39–50)
HGB BLD-MCNC: 10.7 G/DL — LOW (ref 13–17)
INR BLD: 1.36 RATIO — HIGH (ref 0.88–1.16)
LDH SERPL L TO P-CCNC: 238 U/L — SIGNIFICANT CHANGE UP (ref 50–242)
MCHC RBC-ENTMCNC: 30 PG — SIGNIFICANT CHANGE UP (ref 27–34)
MCHC RBC-ENTMCNC: 30.7 GM/DL — LOW (ref 32–36)
MCV RBC AUTO: 97.5 FL — SIGNIFICANT CHANGE UP (ref 80–100)
NRBC # BLD: 0 /100 WBCS — SIGNIFICANT CHANGE UP (ref 0–0)
PLATELET # BLD AUTO: 309 K/UL — SIGNIFICANT CHANGE UP (ref 150–400)
POTASSIUM SERPL-MCNC: 4.9 MMOL/L — SIGNIFICANT CHANGE UP (ref 3.5–5.3)
POTASSIUM SERPL-SCNC: 4.9 MMOL/L — SIGNIFICANT CHANGE UP (ref 3.5–5.3)
PROTHROM AB SERPL-ACNC: 16 SEC — HIGH (ref 10.6–13.6)
RBC # BLD: 3.57 M/UL — LOW (ref 4.2–5.8)
RBC # FLD: 18.4 % — HIGH (ref 10.3–14.5)
SODIUM SERPL-SCNC: 133 MMOL/L — LOW (ref 135–145)
WBC # BLD: 7.24 K/UL — SIGNIFICANT CHANGE UP (ref 3.8–10.5)
WBC # FLD AUTO: 7.24 K/UL — SIGNIFICANT CHANGE UP (ref 3.8–10.5)

## 2020-09-18 PROCEDURE — 82962 GLUCOSE BLOOD TEST: CPT

## 2020-09-18 PROCEDURE — 99239 HOSP IP/OBS DSCHRG MGMT >30: CPT

## 2020-09-18 PROCEDURE — 86850 RBC ANTIBODY SCREEN: CPT

## 2020-09-18 PROCEDURE — C1764: CPT

## 2020-09-18 PROCEDURE — 76700 US EXAM ABDOM COMPLETE: CPT

## 2020-09-18 PROCEDURE — 85027 COMPLETE CBC AUTOMATED: CPT

## 2020-09-18 PROCEDURE — 82150 ASSAY OF AMYLASE: CPT

## 2020-09-18 PROCEDURE — 97530 THERAPEUTIC ACTIVITIES: CPT

## 2020-09-18 PROCEDURE — P9059: CPT

## 2020-09-18 PROCEDURE — P9016: CPT

## 2020-09-18 PROCEDURE — 71045 X-RAY EXAM CHEST 1 VIEW: CPT

## 2020-09-18 PROCEDURE — 74177 CT ABD & PELVIS W/CONTRAST: CPT

## 2020-09-18 PROCEDURE — 93005 ELECTROCARDIOGRAM TRACING: CPT

## 2020-09-18 PROCEDURE — 70480 CT ORBIT/EAR/FOSSA W/O DYE: CPT

## 2020-09-18 PROCEDURE — 97162 PT EVAL MOD COMPLEX 30 MIN: CPT

## 2020-09-18 PROCEDURE — 33285 INSJ SUBQ CAR RHYTHM MNTR: CPT

## 2020-09-18 PROCEDURE — 80053 COMPREHEN METABOLIC PANEL: CPT

## 2020-09-18 PROCEDURE — 70486 CT MAXILLOFACIAL W/O DYE: CPT

## 2020-09-18 PROCEDURE — P9017: CPT

## 2020-09-18 PROCEDURE — 83690 ASSAY OF LIPASE: CPT

## 2020-09-18 PROCEDURE — 83615 LACTATE (LD) (LDH) ENZYME: CPT

## 2020-09-18 PROCEDURE — U0003: CPT

## 2020-09-18 PROCEDURE — 99285 EMERGENCY DEPT VISIT HI MDM: CPT | Mod: 25

## 2020-09-18 PROCEDURE — 80048 BASIC METABOLIC PNL TOTAL CA: CPT

## 2020-09-18 PROCEDURE — 86769 SARS-COV-2 COVID-19 ANTIBODY: CPT

## 2020-09-18 PROCEDURE — 85610 PROTHROMBIN TIME: CPT

## 2020-09-18 PROCEDURE — 86923 COMPATIBILITY TEST ELECTRIC: CPT

## 2020-09-18 PROCEDURE — 97116 GAIT TRAINING THERAPY: CPT

## 2020-09-18 PROCEDURE — 84484 ASSAY OF TROPONIN QUANT: CPT

## 2020-09-18 PROCEDURE — 72170 X-RAY EXAM OF PELVIS: CPT

## 2020-09-18 PROCEDURE — 85730 THROMBOPLASTIN TIME PARTIAL: CPT

## 2020-09-18 PROCEDURE — 84100 ASSAY OF PHOSPHORUS: CPT

## 2020-09-18 PROCEDURE — 70450 CT HEAD/BRAIN W/O DYE: CPT

## 2020-09-18 PROCEDURE — 36430 TRANSFUSION BLD/BLD COMPNT: CPT

## 2020-09-18 PROCEDURE — 86900 BLOOD TYPING SEROLOGIC ABO: CPT

## 2020-09-18 PROCEDURE — 83735 ASSAY OF MAGNESIUM: CPT

## 2020-09-18 PROCEDURE — 72125 CT NECK SPINE W/O DYE: CPT

## 2020-09-18 PROCEDURE — 74018 RADEX ABDOMEN 1 VIEW: CPT

## 2020-09-18 PROCEDURE — 86901 BLOOD TYPING SEROLOGIC RH(D): CPT

## 2020-09-18 PROCEDURE — 71260 CT THORAX DX C+: CPT

## 2020-09-18 RX ORDER — ACETAMINOPHEN 500 MG
1 TABLET ORAL
Qty: 0 | Refills: 0 | DISCHARGE
Start: 2020-09-18

## 2020-09-18 RX ORDER — GABAPENTIN 400 MG/1
1 CAPSULE ORAL
Qty: 0 | Refills: 0 | DISCHARGE
Start: 2020-09-18

## 2020-09-18 RX ORDER — SIMETHICONE 80 MG/1
1 TABLET, CHEWABLE ORAL
Qty: 0 | Refills: 0 | DISCHARGE
Start: 2020-09-18

## 2020-09-18 RX ORDER — PREGABALIN 225 MG/1
1 CAPSULE ORAL
Qty: 0 | Refills: 0 | DISCHARGE
Start: 2020-09-18

## 2020-09-18 RX ORDER — GABAPENTIN 400 MG/1
100 CAPSULE ORAL DAILY
Refills: 0 | Status: DISCONTINUED | OUTPATIENT
Start: 2020-09-18 | End: 2020-09-18

## 2020-09-18 RX ORDER — METHOCARBAMOL 500 MG/1
500 TABLET, FILM COATED ORAL EVERY 6 HOURS
Refills: 0 | Status: DISCONTINUED | OUTPATIENT
Start: 2020-09-18 | End: 2020-09-18

## 2020-09-18 RX ORDER — ACETAMINOPHEN 500 MG
2 TABLET ORAL
Qty: 0 | Refills: 0 | DISCHARGE
Start: 2020-09-18

## 2020-09-18 RX ORDER — LIDOCAINE 4 G/100G
0 CREAM TOPICAL
Qty: 0 | Refills: 0 | DISCHARGE
Start: 2020-09-18

## 2020-09-18 RX ORDER — WARFARIN SODIUM 2.5 MG/1
1 TABLET ORAL
Qty: 0 | Refills: 0 | DISCHARGE
Start: 2020-09-18

## 2020-09-18 RX ORDER — FOLIC ACID 0.8 MG
1 TABLET ORAL
Qty: 0 | Refills: 0 | DISCHARGE
Start: 2020-09-18

## 2020-09-18 RX ORDER — METHOCARBAMOL 500 MG/1
1 TABLET, FILM COATED ORAL
Qty: 0 | Refills: 0 | DISCHARGE
Start: 2020-09-18

## 2020-09-18 RX ORDER — WARFARIN SODIUM 2.5 MG/1
5 TABLET ORAL ONCE
Refills: 0 | Status: DISCONTINUED | OUTPATIENT
Start: 2020-09-18 | End: 2020-09-18

## 2020-09-18 RX ORDER — POLYETHYLENE GLYCOL 3350 17 G/17G
17 POWDER, FOR SOLUTION ORAL
Qty: 0 | Refills: 0 | DISCHARGE
Start: 2020-09-18

## 2020-09-18 RX ORDER — FERROUS SULFATE 325(65) MG
1 TABLET ORAL
Qty: 0 | Refills: 0 | DISCHARGE
Start: 2020-09-18

## 2020-09-18 RX ORDER — GABAPENTIN 400 MG/1
100 CAPSULE ORAL AT BEDTIME
Refills: 0 | Status: DISCONTINUED | OUTPATIENT
Start: 2020-09-18 | End: 2020-09-18

## 2020-09-18 RX ORDER — LISINOPRIL 2.5 MG/1
1 TABLET ORAL
Qty: 0 | Refills: 0 | DISCHARGE

## 2020-09-18 RX ADMIN — SPIRONOLACTONE 25 MILLIGRAM(S): 25 TABLET, FILM COATED ORAL at 06:28

## 2020-09-18 RX ADMIN — SIMETHICONE 80 MILLIGRAM(S): 80 TABLET, CHEWABLE ORAL at 06:28

## 2020-09-18 RX ADMIN — SIMETHICONE 80 MILLIGRAM(S): 80 TABLET, CHEWABLE ORAL at 13:24

## 2020-09-18 RX ADMIN — Medication 25 MILLIGRAM(S): at 06:28

## 2020-09-18 RX ADMIN — PANTOPRAZOLE SODIUM 40 MILLIGRAM(S): 20 TABLET, DELAYED RELEASE ORAL at 11:02

## 2020-09-18 RX ADMIN — AMIODARONE HYDROCHLORIDE 200 MILLIGRAM(S): 400 TABLET ORAL at 06:27

## 2020-09-18 RX ADMIN — PREGABALIN 1000 MICROGRAM(S): 225 CAPSULE ORAL at 11:02

## 2020-09-18 RX ADMIN — Medication 1 MILLIGRAM(S): at 11:02

## 2020-09-18 RX ADMIN — Medication 325 MILLIGRAM(S): at 06:27

## 2020-09-18 RX ADMIN — FINASTERIDE 5 MILLIGRAM(S): 5 TABLET, FILM COATED ORAL at 11:02

## 2020-09-18 RX ADMIN — GABAPENTIN 100 MILLIGRAM(S): 400 CAPSULE ORAL at 07:59

## 2020-09-18 RX ADMIN — Medication 325 MILLIGRAM(S): at 13:24

## 2020-09-18 NOTE — PROGRESS NOTE ADULT - ASSESSMENT
65M PMH HF due to NICM HM2 LVAD (2017) on coumadin, TV annuloplasty ring, SIADH, CKD3, was readmitted after fall and stating he had BRBPR. Upon admission patient was noted to have H/H 6.3/21.9, was transfused with 2uPRBC (last on 9/8), with appropriate response. GI consulted and will likely require repeat EGD/ Colonoscopy. CT of IAC revealed left EAC fracture with hemorrhage, which required cauterization and packing. Patient has required multiple admissions for snycope with unknown cause, will likely require ILR to monitor for any cardiac arrhythmia that can be causing syncope.  Due to recurrent falls and fractures at home within a 6 month period, patient is unsafe to be discharged home. Will need to be discharged to a [facility] facility to improve his strength.      65M PMH HF due to NICM HM2 LVAD (2017) on coumadin, TV annuloplasty ring, SIADH, CKD3, was readmitted after fall and stating he had BRBPR. Upon admission patient was noted to have H/H 6.3/21.9, was transfused with 2uPRBC (last on 9/8), with appropriate response. GI consulted and will likely require repeat EGD/ Colonoscopy. CT of IAC revealed left EAC fracture with hemorrhage, which required cauterization and packing. Patient has required multiple admissions for snycope with unknown cause, s/p ILR implant on 9/15 to monitor for any cardiac arrhythmia that can be causing syncope.  Due to recurrent falls and fractures at home within a 6 month period, patient is unsafe to be discharged home. Will need to be discharged to a [facility] facility to improve his strength.

## 2020-09-18 NOTE — DISCHARGE NOTE PROVIDER - HOSPITAL COURSE
65M PMH HF due to NICM HM2 LVAD (2017) on coumadin, TV annuloplasty ring, SIADH, CKD3, was readmitted after fall and stating he had BRBPR. Upon admission patient was noted to have H/H 6.3/21.9, was transfused with 2uPRBC (last on 9/8), with appropriate response. GI consulted and will likely require repeat EGD/ Colonoscopy. CT of IAC revealed left EAC fracture with hemorrhage, which required cauterization and packing. Patient has required multiple admissions for snycope with unknown cause, underwent ILR implant on 9/15 to monitor for any cardiac arrhythmia that can be causing syncope.  Due to recurrent falls and fractures at home within a 6 month period, patient is unsafe to be discharged home. Will be discharged to a [facility] facility to improve his strength.

## 2020-09-18 NOTE — DISCHARGE NOTE NURSING/CASE MANAGEMENT/SOCIAL WORK - PATIENT PORTAL LINK FT
You can access the FollowMyHealth Patient Portal offered by [facility] by registering at the following website: [email or web address]. By joining [employer]’s FollowMyHealth portal, you will also be able to view your health information using other applications (apps) compatible with our system.

## 2020-09-18 NOTE — PROGRESS NOTE ADULT - REASON FOR ADMISSION
Fall
Fall,  anemia,  elevated INR
Fall

## 2020-09-18 NOTE — PROGRESS NOTE ADULT - PROBLEM SELECTOR PLAN 7
- hx of syncope without known cause. has required multiple hospitalizations in the setting of low hemoglobin and elevated INR.   - Given patients advanced heart failure and substrate for VT, EP consulted, underwent ILR implant (9/15).  - Orthostatics negative

## 2020-09-18 NOTE — PROGRESS NOTE ADULT - PROBLEM SELECTOR PLAN 4
- Hx of Atrial fibrillation, continue home Amio 200 mg daily.
Hx of syncope without known cause  Multiple hospitalizations in the setting of low hemoglobin and elevated INR.   As per HF recommendations - EP consulted for ILR implant, will be placed later today (9/15).   Monitor BP for orthostatic hypotension
Hx of syncope without known cause  Multiple hospitalizations in the setting of low hemoglobin and elevated INR.   As per HF recommendations - EP consulted for ILR implant, will be placed later today (9/15).   Monitor BP for orthostatic hypotension
- Hx of Atrial fibrillation, continue home Amio 200 mg daily.
Hx of syncope without known cause  Multiple hospitalizations in the setting of low hemoglobin and elevated INR.   As per HF recommendations - EP consulted for ILR implant, will be placed later today (9/15).   Monitor BP for orthostatic hypotension

## 2020-09-18 NOTE — DISCHARGE NOTE PROVIDER - NSDCPNSUBOBJ_GEN_ALL_CORE
Subjective: Patient seen and examined resting in bed.  ON no issues.     Medications:  acetaminophen   Tablet .. 650 milliGRAM(s) Oral every 6 hours PRN  aMIOdarone    Tablet 200 milliGRAM(s) Oral daily  cyanocobalamin 1000 MICROGram(s) Oral daily  ferrous    sulfate 325 milliGRAM(s) Oral three times a day  finasteride 5 milliGRAM(s) Oral daily  folic acid 1 milliGRAM(s) Oral daily  gabapentin 100 milliGRAM(s) Oral daily  influenza   Vaccine 0.5 milliLiter(s) IntraMuscular once  lidocaine   Patch 1 Patch Transdermal every 24 hours  methocarbamol 500 milliGRAM(s) Oral every 6 hours PRN  metoprolol succinate ER 25 milliGRAM(s) Oral daily  mirtazapine 7.5 milliGRAM(s) Oral at bedtime  pantoprazole    Tablet 40 milliGRAM(s) Oral daily  polyethylene glycol 3350 17 Gram(s) Oral two times a day  senna 2 Tablet(s) Oral at bedtime  simethicone 80 milliGRAM(s) Chew three times a day  spironolactone 25 milliGRAM(s) Oral daily    Vitals:  Vital Signs Last 24 Hours  T(C): 36.6 (20 @ 06:20), Max: 37.1 (20 @ 09:47)  HR: 90 (20 @ 06:20) (74 - 90)  BP: --  RR: 17 (20 @ 06:20) (17 - 18)  SpO2: 99% (20 @ 06:20) (96% - 99%)    Weight in k.4 ( @ 08:34)    I&O's Summary    17 Sep 2020 07:  -  18 Sep 2020 07:00  --------------------------------------------------------  IN: 1140 mL / OUT: 1750 mL / NET: -610 mL    18 Sep 2020 07:  -  18 Sep 2020 08:17  --------------------------------------------------------  IN: 300 mL / OUT: 0 mL / NET: 300 mL        Physical Exam  General: No distress. Comfortable.  HEENT: mild right facial swelling along with infra orbital ecchymosis (slowly improving)  Neck: Neck supple. No JVP noted. No masses  Chest: Clear to auscultation bilaterally  CV: +VAD hum  Abdomen: Soft, non-distended, mild left sided tenderness without rebound or guarding  Skin: No rashes or skin breakdown  Neurology: Alert and oriented times three. Sensation intact    LVAD Interrogation  VAD TYPE HM 2  Speed 8800  Flow 4.2  Power 4.6  PI  6.5  Assessment of driveline exit site: driveline dressing c/d/i  Programming changes: no changes made     Labs:                        10.7   7.24  )-----------( 309      ( 18 Sep 2020 07:03 )             34.8         133<L>  |  100  |  22  ----------------------------<  98  4.9   |  23  |  1.16    Ca    9.9      18 Sep 2020 07:03      PT/INR - ( 18 Sep 2020 07:03 )   PT: 16.0 sec;   INR: 1.36 ratio        Lactate Dehydrogenase, Serum: 238 U/L ( @ 07:03)  Lactate Dehydrogenase, Serum: 227 U/L ( @ 05:38)  Lactate Dehydrogenase, Serum: 241 U/L ( @ 07:09)

## 2020-09-18 NOTE — PROGRESS NOTE ADULT - PROBLEM SELECTOR PROBLEM 3
Anemia
LVAD (left ventricular assist device) present
Anemia
LVAD (left ventricular assist device) present
Anemia
LVAD (left ventricular assist device) present
LVAD (left ventricular assist device) present

## 2020-09-18 NOTE — PROGRESS NOTE ADULT - PROBLEM SELECTOR PLAN 2
- Continue home Metoprolol succinate 25 mg daily  - Continue home Aldactone 25 mg daily.  - Holding home Lisinopril allow improvement in blood pressures given hx of falls.

## 2020-09-18 NOTE — PROGRESS NOTE ADULT - PROBLEM SELECTOR PROBLEM 4
Syncope
Syncope
Atrial fibrillation
Syncope

## 2020-09-18 NOTE — DISCHARGE NOTE PROVIDER - INSTRUCTIONS
please provide patient with ensure three times a day    please provide patient with strawberry ensure three times a day

## 2020-09-18 NOTE — DISCHARGE NOTE PROVIDER - NSDCFUADDAPPT_GEN_ALL_CORE_FT
- Will require follow up with LVAD Coordinator 1 week after being discharged from rehab  - INR and Coumadin dosing managed by LVAD Coordinator (INR goal 2-2.5)  - Continue to check labs weekly labs while at rehab  - Will require follow up with ENT either Dr. Chris or Zeyad. Call [phone] to schedule appointment.           - Will require follow up with LVAD Coordinator 1 week after being discharged from rehab  - INR and Coumadin dosing managed by LVAD Coordinator (INR goal 2-2.5)  - If needed can call LVAD Coordinator with questions [phone]  -- Continue to check labs weekly labs while at rehab  - Will require follow up with ENT either Dr. Chris or Zeyad. Call [phone] to schedule appointment.          -

## 2020-09-18 NOTE — DISCHARGE NOTE NURSING/CASE MANAGEMENT/SOCIAL WORK - NSDCFUADDAPPT_GEN_ALL_CORE_FT
- Will require follow up with LVAD Coordinator 1 week after being discharged from rehab  - INR and Coumadin dosing managed by LVAD Coordinator (INR goal 2-2.5)  - Continue to check labs weekly labs while at rehab  - Will require follow up with ENT either Dr. Chris or Zeyad. Call [phone] to schedule appointment.

## 2020-09-18 NOTE — PROGRESS NOTE ADULT - PROBLEM SELECTOR PLAN 6
- complains of LUQ and LLQ discomfort. Mild tenderness noted on exam, likely related to fall  - KUB 9/10 shows non-obstructive gas pattern, Abd u/s normal, CT abdomen with contrast without pathology and recent endoscopy without pathology. There is no signs of acute pathology.   - LFT, Amylase, Lipase all WNL  - Continue Simethicone 80 mg TID for gas discomfort  - Chronic pain consulted, appreciate recommendations. Will be started on Robaxin 500 mg q6hrs prn and Neurontin 100 mg daily

## 2020-09-18 NOTE — PROGRESS NOTE ADULT - ATTENDING COMMENTS
packing removed and no further active bleeding. cont floxin gtt x 1 week. f/u as outpatient in 1-2 weeks to recheck ear.
Pt seen and examined with team at time of service, I was physically present for the key portions for evaluation and management (E/M) service provided, and preformed key portions of the procedure. Agree with above. Plan discussed with primary team.
Clinically stable from VAD perspective, OOB to chair.   Complains of persistent left sided abdominal pain not relieved by gas or BM.   Working with PT.  Euvolemic, BP controlled, and INR 1.82. Continue warfarin 2 mg tonight.
No sig change in status. Generally feels weak.  Change lisinopril to 2.5 mg BID and hold for MAP < 74.  Would not give warfarin or heparin today.
Underwent ILR placement today for further monitoring of VT as outpatient which could be contributing to falls. Discharge planning to rehab.
Looks a bit better today. OOB to chair. Packing removed from left ear.  Still has left-sided abdominal discomfort and generally feels weak.  Resume lisinopril 2.5 mg BID and hold for MAP < 74.  Start warfarin 2 mg tonight.  KUB given persistent abdominal discomfort. No BM or gas, but has bowel sounds.
Appears euvolemic and MAP at goal. INR subtherapeutic so will increase coumadin slightly. Will discuss ILR vs ICD with EP given recurrent hospitalizations with drop syncope without ICD and noted NSVT on telemetry. Eventual discharge to rehab
Stable for discharge today. MAPs persistently 78-82 and off diuretics so hopefully will have no further episodes of syncope, if so ILR is in place. Increasing coumadin slightly but will aim for lower end of goal given frequent admissions with high INR.
Will discontinue lisinopril to promote higher MAPs in setting of prior falls. Increase coumadin. Hyponatremia noted likely from hypovolemia from poor PO intake in hospital, will administer 500 cc normal saline. Chronic pain to see for abdominal pain with negative imaging/endoscopic workup. Potential discharge to rehab tomorrow.

## 2020-09-18 NOTE — DISCHARGE NOTE PROVIDER - CARE PROVIDER_API CALL
Tamanna Mcclendon  NP IN ADULT HEALTH  [address] [place] [id number]  Phone: [phone]  Fax: [phone]  Follow Up Time:     Ioana Chris  OTOLARYNGOLOGY  [address] [place] [address] [address]  [place] [address]  Phone: [phone]  Fax: [phone]  Follow Up Time:

## 2020-09-18 NOTE — PROGRESS NOTE ADULT - PROBLEM SELECTOR PROBLEM 6
Left sided abdominal pain

## 2020-09-18 NOTE — DISCHARGE NOTE PROVIDER - NSDCMRMEDTOKEN_GEN_ALL_CORE_FT
acetaminophen 325 mg oral tablet: 2 tab(s) orally every 6 hours, As needed, Temp greater or equal to 38.5C (101.3F), Mild Pain (1 - 3)  amiodarone 200 mg oral tablet: 1 tab(s) orally once a day   cyanocobalamin 1000 mcg oral tablet: 1 tab(s) orally once a day  ferrous sulfate 325 mg (65 mg elemental iron) oral tablet: 1 tab(s) orally 3 times a day  finasteride 5 mg oral tablet: 1 tab(s) orally once a day  folic acid 1 mg oral tablet: 1 tab(s) orally once a day  gabapentin 100 mg oral capsule: 1 cap(s) orally once a day (at bedtime)  lidocaine 5% topical film: Apply topically to affected area once a day  Please apply to left upper abdomen   methocarbamol 500 mg oral tablet: 1 tab(s) orally every 6 hours, As needed, pain and/ or spasm  metoprolol succinate 25 mg oral tablet, extended release: 1 tab(s) orally once a day  mirtazapine 7.5 mg oral tablet: 1 tab(s) orally once a day (at bedtime)  pantoprazole 40 mg oral delayed release tablet: 1 tab(s) orally once a day (before a meal)  Indication: Gi ppx   polyethylene glycol 3350 oral powder for reconstitution: 17 gram(s) orally 2 times a day  simethicone 80 mg oral tablet, chewable: 1 tab(s) orally 3 times a day  spironolactone 25 mg oral tablet: 1 tab(s) orally once a day  Indication: heart rate  warfarin 5 mg oral tablet: 1 tab(s) orally once a day (in the evening)  INR goal 2-2.5

## 2020-09-18 NOTE — PROGRESS NOTE ADULT - PROVIDER SPECIALTY LIST ADULT
CT Surgery
ENT
Electrophysiology
Electrophysiology
Gastroenterology
Heart Failure
CT Surgery
Heart Failure

## 2020-09-18 NOTE — PROGRESS NOTE ADULT - PROBLEM SELECTOR PROBLEM 1
LVAD (left ventricular assist device) present
Otorrhagia of left ear
Ear bleeding
Ear bleeding, left
LVAD (left ventricular assist device) present
Otorrhagia of left ear
LVAD (left ventricular assist device) present
Otorrhagia of left ear
Otorrhagia of left ear

## 2020-09-18 NOTE — PROGRESS NOTE ADULT - PROBLEM SELECTOR PROBLEM 2
Otorrhagia of left ear
ACC/AHA stage D systolic heart failure
Otorrhagia of left ear
ACC/AHA stage D systolic heart failure
ACC/AHA stage D systolic heart failure
Otorrhagia of left ear

## 2020-09-22 ENCOUNTER — RX RENEWAL (OUTPATIENT)
Age: 65
End: 2020-09-22

## 2020-10-01 ENCOUNTER — APPOINTMENT (OUTPATIENT)
Dept: ELECTROPHYSIOLOGY | Facility: CLINIC | Age: 65
End: 2020-10-01
Payer: MEDICARE

## 2020-10-01 ENCOUNTER — NON-APPOINTMENT (OUTPATIENT)
Age: 65
End: 2020-10-01

## 2020-10-01 ENCOUNTER — APPOINTMENT (OUTPATIENT)
Dept: HEART FAILURE | Facility: CLINIC | Age: 65
End: 2020-10-01
Payer: MEDICARE

## 2020-10-01 VITALS
SYSTOLIC BLOOD PRESSURE: 102 MMHG | TEMPERATURE: 98 F | DIASTOLIC BLOOD PRESSURE: 68 MMHG | BODY MASS INDEX: 23.62 KG/M2 | RESPIRATION RATE: 12 BRPM | HEIGHT: 70 IN | OXYGEN SATURATION: 100 % | HEART RATE: 76 BPM | WEIGHT: 165 LBS

## 2020-10-01 DIAGNOSIS — R07.81 PLEURODYNIA: ICD-10-CM

## 2020-10-01 DIAGNOSIS — R55 SYNCOPE AND COLLAPSE: ICD-10-CM

## 2020-10-01 PROCEDURE — 93750 INTERROGATION VAD IN PERSON: CPT

## 2020-10-01 PROCEDURE — 99214 OFFICE O/P EST MOD 30 MIN: CPT

## 2020-10-01 PROCEDURE — 93000 ELECTROCARDIOGRAM COMPLETE: CPT

## 2020-10-01 PROCEDURE — 93291 INTERROG DEV EVAL SCRMS IP: CPT

## 2020-10-01 NOTE — PHYSICAL EXAM
[General Appearance - Well Developed] : well developed [Normal Appearance] : normal appearance [General Appearance - Well Nourished] : well nourished [Normal Conjunctiva] : the conjunctiva exhibited no abnormalities [Normal Oral Mucosa] : normal oral mucosa [Normal Jugular Venous V Waves Present] : normal jugular venous V waves present [Respiration, Rhythm And Depth] : normal respiratory rhythm and effort [Auscultation Breath Sounds / Voice Sounds] : lungs were clear to auscultation bilaterally [Heart Rate And Rhythm] : heart rate and rhythm were normal [Bowel Sounds] : normal bowel sounds [Abdomen Soft] : soft [Abdomen Tenderness] : non-tender [Abnormal Walk] : normal gait [Nail Clubbing] : no clubbing of the fingernails [Cyanosis, Localized] : no localized cyanosis [Skin Color & Pigmentation] : normal skin color and pigmentation [] : no rash [Oriented To Time, Place, And Person] : oriented to person, place, and time [No Anxiety] : not feeling anxious [FreeTextEntry1] : +hum of LVAD

## 2020-10-01 NOTE — REASON FOR VISIT
[Follow-Up - From Hospitalization] : follow-up of a recent hospitalization for [Cardiomyopathy] : cardiomyopathy [Heart Failure] : congestive heart failure [FreeTextEntry1] : LVAD

## 2020-10-01 NOTE — HISTORY OF PRESENT ILLNESS
[FreeTextEntry1] : Mr. Quispe is a 64 year old man with ACC/AHA stage D systolic heart failure due to a nonischemic cardiomyopathy who presented with abdominal pain and cardiogenic shock in the fall of 2017. Due to inotropic dependence, he underwent HM2 LVAD implantation as destination therapy with TV annuloplasty ring on 9/12/2017. His post-operative course was largely uncomplicated. He had mild rectal bleeding due to internal hemorrhoids. He had one readmission due to infection of unclear etiology in December 2017. It was not thought to be related to the LVAD. His history is also notable for peripheral arterial disease with claudication, which is a contributing factor in the decision to not list him for heart transplantation. \par Patient was admitted to the hospital from  March 31 through 4/20 with COVID + was having low bp and meds were discontinued.\par Patient was again admitted from 9/7/20-9/18-20  s/p fall after a syncopal event. He went to [facility] from 9/18/20-9/25/20.\par Today, he overall feels better he still has left sided pain, which is better when he is up walking, seems to be worse after he eats but not at all time.  \par He denies LVAD alarms. He states he can now walk 10 city blocks without pain in his legs.\par \par \par LVAD Interrogation:\par Device Type: HM2\par Speed: 8800\par Flow: 4.6\par Power: 4.8\par PI: 7.1\par MAP: 100 with doppler (/68; MAP: 80 )\par Alarm: low flow on 9/7 X2 nothing since, Many PI without speed drop\par EKG: NST HR 76 no ectopy \par Back up battery: Primary Battery -502 Expires in 18 months \par Driveline dressing change: incision without erythema , swelling or drainage but dressing did have a small amount of drainage noted on it.Unable to express any drainage

## 2020-10-01 NOTE — DISCUSSION/SUMMARY
[___ Month(s)] : [unfilled] month(s) [FreeTextEntry1] : - LVAD: continue speed at 8800. Appears to be pulsatilie.  \par - Congestive heart failure: Lisinopril stopped and Metoprolol at currently doses. \par - Coumadin: Monitor INR closely and adjust warfarin accordingly, INR goal is 2.0-2.5. \par - Anemia: started on Iron, Folic acid, Vit. B12 and senna\par - Antiplatelets: Continue aspirin at 81mg every day. \par - PAD: Ongoing treatment by Dr. Lovelace. Since improved, no indication for revascularization\par - CKD stage III with hyperkalemia; improved from prior \par - Clavicle FX: resolved\par - Driveline drainanage.\par - Education of LVAD system maintenance was reviewed with patient.\par - Supplies: Gary

## 2020-10-13 ENCOUNTER — NON-APPOINTMENT (OUTPATIENT)
Age: 65
End: 2020-10-13

## 2020-10-20 ENCOUNTER — NON-APPOINTMENT (OUTPATIENT)
Age: 65
End: 2020-10-20

## 2020-10-22 ENCOUNTER — APPOINTMENT (OUTPATIENT)
Dept: CV DIAGNOSITCS | Facility: HOSPITAL | Age: 65
End: 2020-10-22

## 2020-10-22 ENCOUNTER — OUTPATIENT (OUTPATIENT)
Dept: OUTPATIENT SERVICES | Facility: HOSPITAL | Age: 65
LOS: 1 days | End: 2020-10-22
Payer: MEDICARE

## 2020-10-22 DIAGNOSIS — Z95.811 PRESENCE OF HEART ASSIST DEVICE: ICD-10-CM

## 2020-10-22 DIAGNOSIS — Z98.890 OTHER SPECIFIED POSTPROCEDURAL STATES: Chronic | ICD-10-CM

## 2020-10-22 DIAGNOSIS — Z95.811 PRESENCE OF HEART ASSIST DEVICE: Chronic | ICD-10-CM

## 2020-10-22 PROCEDURE — 93306 TTE W/DOPPLER COMPLETE: CPT

## 2020-10-22 PROCEDURE — 93306 TTE W/DOPPLER COMPLETE: CPT | Mod: 26

## 2020-10-25 ENCOUNTER — RX RENEWAL (OUTPATIENT)
Age: 65
End: 2020-10-25

## 2020-10-28 ENCOUNTER — NON-APPOINTMENT (OUTPATIENT)
Age: 65
End: 2020-10-28

## 2020-10-29 ENCOUNTER — APPOINTMENT (OUTPATIENT)
Dept: HEART FAILURE | Facility: CLINIC | Age: 65
End: 2020-10-29
Payer: MEDICARE

## 2020-10-29 ENCOUNTER — NON-APPOINTMENT (OUTPATIENT)
Age: 65
End: 2020-10-29

## 2020-10-29 VITALS
HEART RATE: 81 BPM | HEIGHT: 70 IN | DIASTOLIC BLOOD PRESSURE: 73 MMHG | RESPIRATION RATE: 12 BRPM | BODY MASS INDEX: 25.05 KG/M2 | TEMPERATURE: 98 F | WEIGHT: 175 LBS | OXYGEN SATURATION: 99 % | SYSTOLIC BLOOD PRESSURE: 124 MMHG

## 2020-10-29 PROCEDURE — 99072 ADDL SUPL MATRL&STAF TM PHE: CPT

## 2020-10-29 PROCEDURE — 93000 ELECTROCARDIOGRAM COMPLETE: CPT

## 2020-10-29 PROCEDURE — 99214 OFFICE O/P EST MOD 30 MIN: CPT

## 2020-10-29 PROCEDURE — 93750 INTERROGATION VAD IN PERSON: CPT

## 2020-10-29 NOTE — HISTORY OF PRESENT ILLNESS
[FreeTextEntry1] : Mr. Quispe is a 64 year old man with ACC/AHA stage D systolic heart failure due to a nonischemic cardiomyopathy who presented with abdominal pain and cardiogenic shock in the fall of 2017. Due to inotropic dependence, he underwent HM2 LVAD implantation as destination therapy with TV annuloplasty ring on 9/12/2017. His post-operative course was largely uncomplicated. He had mild rectal bleeding due to internal hemorrhoids. He had one readmission due to infection of unclear etiology in December 2017. It was not thought to be related to the LVAD. His history is also notable for peripheral arterial disease with claudication, which is a contributing factor in the decision to not list him for heart transplantation. \par Patient was admitted to the hospital from  March 31 through 4/20 with COVID + was having low bp and meds were discontinued.\par Patient was again admitted from 9/7/20-9/18-20  s/p fall after a syncopal event. He went to [facility] from 9/18/20-9/25/20.\par Today, he overall feels better he still has left sided pain, which is better when he is up walking, seems to be worse after he eats but not at all time.  \par He denies LVAD alarms. He states he can now walk 10 city blocks without pain in his legs.\par \par \par LVAD Interrogation:\par Device Type: HM2\par Speed: 8800\par Flow: 4.8\par Power: 4.8\par PI: 7.1\par MAP: 90 with doppler (/73; )\par Alarm: Many PI with speed drop to 8500 X2\par EKG: NST HR 79 no ectopy \par Back up battery: Primary Battery -663 Expires in 18 months \par Driveline dressing change: incision without erythema , swelling or drainage but dressing did have a small amount of drainage noted on it.Unable to express any drainage

## 2020-10-29 NOTE — DISCUSSION/SUMMARY
[FreeTextEntry1] : - LVAD: continue speed at 8800. Appears to be pulsatilie.  \par - Congestive heart failure: Metoprolol at currently doses. \par - Coumadin: Monitor INR closely and adjust warfarin accordingly, INR goal is 2.0-2.5. \par - Anemia: started on Iron, Folic acid, Vit. B12 and senna\par - Antiplatelets: Continue aspirin at 81mg every day. \par - PAD: Ongoing treatment by Dr. Lovelace. Since improved, no indication for revascularization\par - CKD stage III with hyperkalemia; improved from prior \par - Clavicle FX: resolved\par - Driveline drainanage. Will start Keflex 500 TID X 14 days\par - Education of LVAD system maintenance was reviewed with patient.\par - Supplies: Gary

## 2020-11-03 ENCOUNTER — APPOINTMENT (OUTPATIENT)
Dept: ELECTROPHYSIOLOGY | Facility: CLINIC | Age: 65
End: 2020-11-03
Payer: MEDICARE

## 2020-11-03 PROCEDURE — G2066: CPT

## 2020-11-03 PROCEDURE — 93298 REM INTERROG DEV EVAL SCRMS: CPT

## 2020-11-10 ENCOUNTER — NON-APPOINTMENT (OUTPATIENT)
Age: 65
End: 2020-11-10

## 2020-11-12 ENCOUNTER — APPOINTMENT (OUTPATIENT)
Dept: HEART FAILURE | Facility: CLINIC | Age: 65
End: 2020-11-12
Payer: MEDICARE

## 2020-11-12 VITALS
WEIGHT: 176 LBS | TEMPERATURE: 98.2 F | RESPIRATION RATE: 16 BRPM | DIASTOLIC BLOOD PRESSURE: 68 MMHG | HEIGHT: 70 IN | SYSTOLIC BLOOD PRESSURE: 105 MMHG | BODY MASS INDEX: 25.2 KG/M2 | HEART RATE: 68 BPM | OXYGEN SATURATION: 99 %

## 2020-11-12 PROCEDURE — 99213 OFFICE O/P EST LOW 20 MIN: CPT

## 2020-11-12 PROCEDURE — 99072 ADDL SUPL MATRL&STAF TM PHE: CPT

## 2020-11-12 PROCEDURE — 93750 INTERROGATION VAD IN PERSON: CPT

## 2020-11-12 NOTE — HISTORY OF PRESENT ILLNESS
[FreeTextEntry1] : Mr. Quispe is a 64 year old man with ACC/AHA stage D systolic heart failure due to a nonischemic cardiomyopathy who presented with abdominal pain and cardiogenic shock in the fall of 2017. Due to inotropic dependence, he underwent HM2 LVAD implantation as destination therapy with TV annuloplasty ring on 9/12/2017. His post-operative course was largely uncomplicated. He had mild rectal bleeding due to internal hemorrhoids. He had one readmission due to infection of unclear etiology in December 2017. It was not thought to be related to the LVAD. His history is also notable for peripheral arterial disease with claudication, which is a contributing factor in the decision to not list him for heart transplantation. \par Patient was admitted to the hospital from  March 31 through 4/20 with COVID + was having low bp and meds were discontinued.\par Patient was again admitted from 9/7/20-9/18-20  s/p fall after a syncopal event. He went to [facility] from 9/18/20-9/25/20.\par Today, he overall feels better he still has left sided pain, which is better when he is up walking, seems to be worse after he eats but not at all time.  \par He denies LVAD alarms. He states he is having pain in his right leg.\par \par \par LVAD Interrogation:\par Device Type: HM2\par Speed: 8800\par Flow: 4.7\par Power: 4.8\par PI: 7.0\par MAP: 100 with doppler (/68 )\par Alarm: Many PI with speed drop to 8500 X1\par \par Back up battery: Primary Battery -489 Expires in 18 months \par Driveline dressing change: incision without erythema , swelling or drainage but dressing did have a small amount of drainage noted on it.Unable to express any drainage, no cx sent

## 2020-11-12 NOTE — PHYSICAL EXAM
[General Appearance - Well Developed] : well developed [Normal Appearance] : normal appearance [General Appearance - Well Nourished] : well nourished [Normal Conjunctiva] : the conjunctiva exhibited no abnormalities [Normal Oral Mucosa] : normal oral mucosa [Respiration, Rhythm And Depth] : normal respiratory rhythm and effort [Auscultation Breath Sounds / Voice Sounds] : lungs were clear to auscultation bilaterally [Heart Rate And Rhythm] : heart rate and rhythm were normal [Bowel Sounds] : normal bowel sounds [Abdomen Soft] : soft [Abdomen Tenderness] : non-tender [Nail Clubbing] : no clubbing of the fingernails [Cyanosis, Localized] : no localized cyanosis [Petechial Hemorrhages (___cm)] : no petechial hemorrhages [Skin Color & Pigmentation] : normal skin color and pigmentation [] : no rash [Oriented To Time, Place, And Person] : oriented to person, place, and time [No Anxiety] : not feeling anxious [FreeTextEntry1] : no JVD

## 2020-11-12 NOTE — DISCUSSION/SUMMARY
[___ Week(s)] : [unfilled] week(s) [FreeTextEntry1] : - LVAD: continue speed at 8800. Appears to be pulsatilie.  \par - Congestive heart failure: Metoprolol at currently doses. \par - Coumadin: Monitor INR closely and adjust warfarin accordingly, INR goal is 2.0-2.5. \par - Anemia: started on Iron, Folic acid, Vit. B12 and senna\par - Antiplatelets: Continue aspirin at 81mg every day. \par - PAD: Ongoing treatment by Dr. Lovelace. \par - CKD stage III with hyperkalemia; improved from prior \par - Clavicle FX: resolved\par - Driveline drainanage. Will start Keflex 500 TID X 14 days\par - Education of LVAD system maintenance was reviewed with patient.\par - Supplies: Gary

## 2020-11-12 NOTE — REASON FOR VISIT
[Follow-Up - Clinic] : a clinic follow-up of [Cardiomyopathy] : cardiomyopathy [Heart Failure] : congestive heart failure [FreeTextEntry2] : driveline drainage [FreeTextEntry1] : LVAD

## 2020-11-17 ENCOUNTER — NON-APPOINTMENT (OUTPATIENT)
Age: 65
End: 2020-11-17

## 2020-11-24 ENCOUNTER — NON-APPOINTMENT (OUTPATIENT)
Age: 65
End: 2020-11-24

## 2020-11-27 NOTE — DIETITIAN INITIAL EVALUATION ADULT. - ORAL INTAKE PTA
fair/Pt reports a fair intake PTA. States he doesn't cook therefore he buys him meals pre cooked. Pt refusing to provide a specific diet recall at this time, states it varies. daily assessment

## 2020-12-01 ENCOUNTER — NON-APPOINTMENT (OUTPATIENT)
Age: 65
End: 2020-12-01

## 2020-12-03 ENCOUNTER — NON-APPOINTMENT (OUTPATIENT)
Age: 65
End: 2020-12-03

## 2020-12-03 ENCOUNTER — APPOINTMENT (OUTPATIENT)
Dept: HEART FAILURE | Facility: CLINIC | Age: 65
End: 2020-12-03
Payer: MEDICARE

## 2020-12-03 VITALS
SYSTOLIC BLOOD PRESSURE: 106 MMHG | WEIGHT: 176 LBS | HEART RATE: 74 BPM | RESPIRATION RATE: 16 BRPM | DIASTOLIC BLOOD PRESSURE: 69 MMHG | BODY MASS INDEX: 25.2 KG/M2 | TEMPERATURE: 98.1 F | HEIGHT: 70 IN | OXYGEN SATURATION: 99 %

## 2020-12-03 PROCEDURE — 99214 OFFICE O/P EST MOD 30 MIN: CPT

## 2020-12-03 PROCEDURE — 93750 INTERROGATION VAD IN PERSON: CPT

## 2020-12-03 PROCEDURE — 99072 ADDL SUPL MATRL&STAF TM PHE: CPT

## 2020-12-03 PROCEDURE — 93000 ELECTROCARDIOGRAM COMPLETE: CPT

## 2020-12-03 NOTE — HISTORY OF PRESENT ILLNESS
[FreeTextEntry1] : Mr. Quispe is a 64 year old man with ACC/AHA stage D systolic heart failure due to a nonischemic cardiomyopathy who presented with abdominal pain and cardiogenic shock in the fall of 2017. Due to inotropic dependence, he underwent HM2 LVAD implantation as destination therapy with TV annuloplasty ring on 9/12/2017. His post-operative course was largely uncomplicated. He had mild rectal bleeding due to internal hemorrhoids. He had one readmission due to infection of unclear etiology in December 2017. It was not thought to be related to the LVAD. His history is also notable for peripheral arterial disease with claudication, which is a contributing factor in the decision to not list him for heart transplantation. \par Patient was admitted to the hospital from  March 31 through 4/20 with COVID + was having low bp and meds were discontinued.\par Patient was again admitted from 9/7/20-9/18-20  s/p fall after a syncopal event. He went to [facility] from 9/18/20-9/25/20.\par Today, he overall feels better he still has left sided pain, which is better when he is up walking, seems to be worse after he eats but not at all time.  \par He denies LVAD alarms. \par \par \par LVAD Interrogation:\par Device Type: HM2\par Speed: 8800\par Flow: 4.6\par Power: 4.8\par PI: 6.9\par MAP: 100 with doppler (/69)\par Alarm: No alarms\par Back up battery: Primary Battery -823 Expires in 16 months \par Driveline dressing change: incision without erythema , swelling or drainage but dressing did have a small amount of drainage noted on it. Unable to express any drainage, no cx sent

## 2020-12-03 NOTE — DISCUSSION/SUMMARY
[___ Month(s)] : [unfilled] month(s) [FreeTextEntry1] : - LVAD: continue speed at 8800. Appears to be pulsatilie.  \par - Congestive heart failure: Metoprolol at currently doses. \par - Coumadin: Monitor INR closely and adjust warfarin accordingly, INR goal is 2.0-2.5. \par - Anemia: started on Iron, Folic acid, Vit. B12 and senna\par - Antiplatelets: Continue aspirin at 81mg every day. \par - PAD: Ongoing treatment by Dr. Lovelace. \par - CKD stage III with hyperkalemia; improved from prior \par - Clavicle FX: resolved\par - Driveline drainanage. Will start Keflex 500 TID X 14 days\par - Education of LVAD system maintenance was reviewed with patient.\par - Supplies: Gary

## 2020-12-08 ENCOUNTER — NON-APPOINTMENT (OUTPATIENT)
Age: 65
End: 2020-12-08

## 2020-12-16 ENCOUNTER — RX RENEWAL (OUTPATIENT)
Age: 65
End: 2020-12-16

## 2020-12-17 ENCOUNTER — NON-APPOINTMENT (OUTPATIENT)
Age: 65
End: 2020-12-17

## 2020-12-17 ENCOUNTER — APPOINTMENT (OUTPATIENT)
Dept: ELECTROPHYSIOLOGY | Facility: CLINIC | Age: 65
End: 2020-12-17
Payer: MEDICARE

## 2020-12-17 PROCEDURE — 93298 REM INTERROG DEV EVAL SCRMS: CPT

## 2020-12-17 PROCEDURE — G2066: CPT

## 2020-12-22 ENCOUNTER — NON-APPOINTMENT (OUTPATIENT)
Age: 65
End: 2020-12-22

## 2020-12-29 DIAGNOSIS — Z51.81 ENCOUNTER FOR THERAPEUTIC DRUG LVL MONITORING: ICD-10-CM

## 2020-12-29 DIAGNOSIS — Z79.01 ENCOUNTER FOR THERAPEUTIC DRUG LVL MONITORING: ICD-10-CM

## 2021-01-01 ENCOUNTER — FORM ENCOUNTER (OUTPATIENT)
Age: 66
End: 2021-01-01

## 2021-01-01 ENCOUNTER — NON-APPOINTMENT (OUTPATIENT)
Age: 66
End: 2021-01-01

## 2021-01-01 ENCOUNTER — RESULT CHARGE (OUTPATIENT)
Age: 66
End: 2021-01-01

## 2021-01-01 ENCOUNTER — RX RENEWAL (OUTPATIENT)
Age: 66
End: 2021-01-01

## 2021-01-01 ENCOUNTER — TRANSCRIPTION ENCOUNTER (OUTPATIENT)
Age: 66
End: 2021-01-01

## 2021-01-01 ENCOUNTER — APPOINTMENT (OUTPATIENT)
Dept: HEART FAILURE | Facility: CLINIC | Age: 66
End: 2021-01-01

## 2021-01-01 ENCOUNTER — APPOINTMENT (OUTPATIENT)
Dept: ELECTROPHYSIOLOGY | Facility: CLINIC | Age: 66
End: 2021-01-01
Payer: MEDICARE

## 2021-01-01 ENCOUNTER — APPOINTMENT (OUTPATIENT)
Dept: PULMONOLOGY | Facility: CLINIC | Age: 66
End: 2021-01-01

## 2021-01-01 ENCOUNTER — APPOINTMENT (OUTPATIENT)
Dept: ELECTROPHYSIOLOGY | Facility: CLINIC | Age: 66
End: 2021-01-01

## 2021-01-01 ENCOUNTER — APPOINTMENT (OUTPATIENT)
Age: 66
End: 2021-01-01

## 2021-01-01 ENCOUNTER — APPOINTMENT (OUTPATIENT)
Dept: PULMONOLOGY | Facility: CLINIC | Age: 66
End: 2021-01-01
Payer: MEDICARE

## 2021-01-01 ENCOUNTER — INPATIENT (INPATIENT)
Facility: HOSPITAL | Age: 66
LOS: 3 days | Discharge: HOME HEALTH | DRG: 206 | End: 2021-12-06
Attending: STUDENT IN AN ORGANIZED HEALTH CARE EDUCATION/TRAINING PROGRAM | Admitting: STUDENT IN AN ORGANIZED HEALTH CARE EDUCATION/TRAINING PROGRAM
Payer: MEDICARE

## 2021-01-01 ENCOUNTER — INPATIENT (INPATIENT)
Facility: HOSPITAL | Age: 66
LOS: 155 days | Discharge: NURSING, REHAB OR LONG-TERM CARE | DRG: 3 | End: 2021-11-17
Attending: THORACIC SURGERY (CARDIOTHORACIC VASCULAR SURGERY) | Admitting: THORACIC SURGERY (CARDIOTHORACIC VASCULAR SURGERY)
Payer: MEDICARE

## 2021-01-01 ENCOUNTER — INPATIENT (INPATIENT)
Facility: HOSPITAL | Age: 66
LOS: 52 days | Discharge: NURSING, REHAB OR LONG-TERM CARE | DRG: 871 | End: 2022-02-04
Attending: INTERNAL MEDICINE | Admitting: STUDENT IN AN ORGANIZED HEALTH CARE EDUCATION/TRAINING PROGRAM
Payer: MEDICARE

## 2021-01-01 ENCOUNTER — APPOINTMENT (OUTPATIENT)
Dept: HEART FAILURE | Facility: CLINIC | Age: 66
End: 2021-01-01
Payer: MEDICARE

## 2021-01-01 VITALS
DIASTOLIC BLOOD PRESSURE: 59 MMHG | HEART RATE: 83 BPM | RESPIRATION RATE: 18 BRPM | HEIGHT: 70 IN | SYSTOLIC BLOOD PRESSURE: 65 MMHG | OXYGEN SATURATION: 97 % | TEMPERATURE: 98 F

## 2021-01-01 VITALS
DIASTOLIC BLOOD PRESSURE: 70 MMHG | BODY MASS INDEX: 22.19 KG/M2 | OXYGEN SATURATION: 98 % | HEIGHT: 70 IN | TEMPERATURE: 98.3 F | SYSTOLIC BLOOD PRESSURE: 118 MMHG | WEIGHT: 155 LBS | HEART RATE: 94 BPM | RESPIRATION RATE: 16 BRPM

## 2021-01-01 VITALS
TEMPERATURE: 98 F | HEART RATE: 99 BPM | DIASTOLIC BLOOD PRESSURE: 62 MMHG | SYSTOLIC BLOOD PRESSURE: 85 MMHG | OXYGEN SATURATION: 99 % | RESPIRATION RATE: 17 BRPM

## 2021-01-01 VITALS
SYSTOLIC BLOOD PRESSURE: 96 MMHG | RESPIRATION RATE: 18 BRPM | WEIGHT: 118.17 LBS | TEMPERATURE: 98 F | HEIGHT: 65 IN | DIASTOLIC BLOOD PRESSURE: 67 MMHG | OXYGEN SATURATION: 100 % | HEART RATE: 126 BPM

## 2021-01-01 VITALS — HEART RATE: 109 BPM | RESPIRATION RATE: 29 BRPM | DIASTOLIC BLOOD PRESSURE: 60 MMHG | SYSTOLIC BLOOD PRESSURE: 78 MMHG

## 2021-01-01 VITALS — HEART RATE: 104 BPM

## 2021-01-01 VITALS
BODY MASS INDEX: 21.47 KG/M2 | SYSTOLIC BLOOD PRESSURE: 110 MMHG | OXYGEN SATURATION: 98 % | WEIGHT: 150 LBS | HEIGHT: 70 IN | DIASTOLIC BLOOD PRESSURE: 70 MMHG

## 2021-01-01 DIAGNOSIS — D72.829 ELEVATED WHITE BLOOD CELL COUNT, UNSPECIFIED: ICD-10-CM

## 2021-01-01 DIAGNOSIS — I47.2 VENTRICULAR TACHYCARDIA: ICD-10-CM

## 2021-01-01 DIAGNOSIS — A49.8 OTHER BACTERIAL INFECTIONS OF UNSPECIFIED SITE: ICD-10-CM

## 2021-01-01 DIAGNOSIS — I50.9 HEART FAILURE, UNSPECIFIED: ICD-10-CM

## 2021-01-01 DIAGNOSIS — Z01.811 ENCOUNTER FOR PREPROCEDURAL RESPIRATORY EXAMINATION: ICD-10-CM

## 2021-01-01 DIAGNOSIS — I50.20 UNSPECIFIED SYSTOLIC (CONGESTIVE) HEART FAILURE: ICD-10-CM

## 2021-01-01 DIAGNOSIS — Z95.811 PRESENCE OF HEART ASSIST DEVICE: Chronic | ICD-10-CM

## 2021-01-01 DIAGNOSIS — R74.01 ELEVATION OF LEVELS OF LIVER TRANSAMINASE LEVELS: ICD-10-CM

## 2021-01-01 DIAGNOSIS — Z95.811 PRESENCE OF HEART ASSIST DEVICE: ICD-10-CM

## 2021-01-01 DIAGNOSIS — R10.9 UNSPECIFIED ABDOMINAL PAIN: ICD-10-CM

## 2021-01-01 DIAGNOSIS — Z51.5 ENCOUNTER FOR PALLIATIVE CARE: ICD-10-CM

## 2021-01-01 DIAGNOSIS — Z71.89 OTHER SPECIFIED COUNSELING: ICD-10-CM

## 2021-01-01 DIAGNOSIS — R73.9 HYPERGLYCEMIA, UNSPECIFIED: ICD-10-CM

## 2021-01-01 DIAGNOSIS — Z98.890 OTHER SPECIFIED POSTPROCEDURAL STATES: Chronic | ICD-10-CM

## 2021-01-01 DIAGNOSIS — R78.81 BACTEREMIA: ICD-10-CM

## 2021-01-01 DIAGNOSIS — E05.90 THYROTOXICOSIS, UNSPECIFIED WITHOUT THYROTOXIC CRISIS OR STORM: ICD-10-CM

## 2021-01-01 DIAGNOSIS — A41.9 SEPSIS, UNSPECIFIED ORGANISM: ICD-10-CM

## 2021-01-01 DIAGNOSIS — F32.A DEPRESSION, UNSPECIFIED: ICD-10-CM

## 2021-01-01 DIAGNOSIS — B99.9 UNSPECIFIED INFECTIOUS DISEASE: ICD-10-CM

## 2021-01-01 DIAGNOSIS — E05.90 THYROTOXICOSIS, UNSPECIFIED W/OUT THYROTOXIC CRISIS OR STORM: ICD-10-CM

## 2021-01-01 DIAGNOSIS — K81.0 ACUTE CHOLECYSTITIS: ICD-10-CM

## 2021-01-01 DIAGNOSIS — Z87.19 PERSONAL HISTORY OF OTHER DISEASES OF THE DIGESTIVE SYSTEM: ICD-10-CM

## 2021-01-01 DIAGNOSIS — N17.9 ACUTE KIDNEY FAILURE, UNSPECIFIED: ICD-10-CM

## 2021-01-01 DIAGNOSIS — Z29.9 ENCOUNTER FOR PROPHYLACTIC MEASURES, UNSPECIFIED: ICD-10-CM

## 2021-01-01 DIAGNOSIS — J96.01 ACUTE RESPIRATORY FAILURE WITH HYPOXIA: ICD-10-CM

## 2021-01-01 DIAGNOSIS — A41.59 OTHER GRAM-NEGATIVE SEPSIS: ICD-10-CM

## 2021-01-01 DIAGNOSIS — D69.6 THROMBOCYTOPENIA, UNSPECIFIED: ICD-10-CM

## 2021-01-01 DIAGNOSIS — K55.069 ACUTE INFARCTION OF INTESTINE, PART AND EXTENT UNSPECIFIED: ICD-10-CM

## 2021-01-01 DIAGNOSIS — E87.1 HYPO-OSMOLALITY AND HYPONATREMIA: ICD-10-CM

## 2021-01-01 DIAGNOSIS — R11.2 NAUSEA WITH VOMITING, UNSPECIFIED: ICD-10-CM

## 2021-01-01 DIAGNOSIS — R06.02 SHORTNESS OF BREATH: ICD-10-CM

## 2021-01-01 DIAGNOSIS — R58 HEMORRHAGE, NOT ELSEWHERE CLASSIFIED: ICD-10-CM

## 2021-01-01 DIAGNOSIS — J96.90 RESPIRATORY FAILURE, UNSPECIFIED, UNSPECIFIED WHETHER WITH HYPOXIA OR HYPERCAPNIA: ICD-10-CM

## 2021-01-01 DIAGNOSIS — R05.9 COUGH, UNSPECIFIED: ICD-10-CM

## 2021-01-01 DIAGNOSIS — D64.9 ANEMIA, UNSPECIFIED: ICD-10-CM

## 2021-01-01 DIAGNOSIS — K55.1 CHRONIC VASCULAR DISORDERS OF INTESTINE: ICD-10-CM

## 2021-01-01 DIAGNOSIS — Z86.19 PERSONAL HISTORY OF OTHER INFECTIOUS AND PARASITIC DISEASES: ICD-10-CM

## 2021-01-01 DIAGNOSIS — R06.6 HICCOUGH: ICD-10-CM

## 2021-01-01 DIAGNOSIS — U07.1 COVID-19: ICD-10-CM

## 2021-01-01 DIAGNOSIS — Z98.890 OTHER SPECIFIED POSTPROCEDURAL STATES: ICD-10-CM

## 2021-01-01 DIAGNOSIS — R53.2 FUNCTIONAL QUADRIPLEGIA: ICD-10-CM

## 2021-01-01 DIAGNOSIS — I42.8 OTHER CARDIOMYOPATHIES: ICD-10-CM

## 2021-01-01 DIAGNOSIS — K92.2 GASTROINTESTINAL HEMORRHAGE, UNSPECIFIED: ICD-10-CM

## 2021-01-01 DIAGNOSIS — R11.0 NAUSEA: ICD-10-CM

## 2021-01-01 DIAGNOSIS — Z93.0 TRACHEOSTOMY STATUS: ICD-10-CM

## 2021-01-01 DIAGNOSIS — T82.7XXA INFECTION AND INFLAMMATORY REACTION DUE TO OTHER CARDIAC AND VASCULAR DEVICES, IMPLANTS AND GRAFTS, INITIAL ENCOUNTER: ICD-10-CM

## 2021-01-01 DIAGNOSIS — M53.3 SACROCOCCYGEAL DISORDERS, NOT ELSEWHERE CLASSIFIED: ICD-10-CM

## 2021-01-01 DIAGNOSIS — R13.10 DYSPHAGIA, UNSPECIFIED: ICD-10-CM

## 2021-01-01 DIAGNOSIS — F32.9 MAJOR DEPRESSIVE DISORDER, SINGLE EPISODE, UNSPECIFIED: ICD-10-CM

## 2021-01-01 DIAGNOSIS — J96.10 CHRONIC RESPIRATORY FAILURE, UNSPECIFIED WHETHER WITH HYPOXIA OR HYPERCAPNIA: ICD-10-CM

## 2021-01-01 DIAGNOSIS — K81.9 CHOLECYSTITIS, UNSPECIFIED: ICD-10-CM

## 2021-01-01 LAB
% ALBUMIN: 39.2 % — SIGNIFICANT CHANGE UP
% ALPHA 1: 7.7 % — SIGNIFICANT CHANGE UP
% ALPHA 2: 12.6 % — SIGNIFICANT CHANGE UP
% BETA: 13.2 % — SIGNIFICANT CHANGE UP
% GAMMA: 27.3 % — SIGNIFICANT CHANGE UP
-  AMIKACIN: SIGNIFICANT CHANGE UP
-  AMOXICILLIN/CLAVULANIC ACID: SIGNIFICANT CHANGE UP
-  AMPICILLIN/SULBACTAM: SIGNIFICANT CHANGE UP
-  AMPICILLIN: SIGNIFICANT CHANGE UP
-  AZTREONAM: SIGNIFICANT CHANGE UP
-  CEFAZOLIN: SIGNIFICANT CHANGE UP
-  CEFEPIME: SIGNIFICANT CHANGE UP
-  CEFOXITIN: SIGNIFICANT CHANGE UP
-  CEFTAZIDIME: SIGNIFICANT CHANGE UP
-  CEFTRIAXONE: SIGNIFICANT CHANGE UP
-  CIPROFLOXACIN: SIGNIFICANT CHANGE UP
-  CLINDAMYCIN: SIGNIFICANT CHANGE UP
-  ERTAPENEM: SIGNIFICANT CHANGE UP
-  ERYTHROMYCIN: SIGNIFICANT CHANGE UP
-  GENTAMICIN: SIGNIFICANT CHANGE UP
-  IMIPENEM: SIGNIFICANT CHANGE UP
-  LEVOFLOXACIN: SIGNIFICANT CHANGE UP
-  MEROPENEM: SIGNIFICANT CHANGE UP
-  OXACILLIN: SIGNIFICANT CHANGE UP
-  PENICILLIN: SIGNIFICANT CHANGE UP
-  PIPERACILLIN/TAZOBACTAM: SIGNIFICANT CHANGE UP
-  RIFAMPIN: SIGNIFICANT CHANGE UP
-  STAPHYLOCOCCUS EPIDERMIDIS: SIGNIFICANT CHANGE UP
-  TETRACYCLINE: SIGNIFICANT CHANGE UP
-  TIGECYCLINE: SIGNIFICANT CHANGE UP
-  TOBRAMYCIN: SIGNIFICANT CHANGE UP
-  TRIMETHOPRIM/SULFAMETHOXAZOLE: SIGNIFICANT CHANGE UP
-  VANCOMYCIN: SIGNIFICANT CHANGE UP
24R-OH-CALCIDIOL SERPL-MCNC: 17.8 NG/ML — LOW (ref 30–80)
A1C WITH ESTIMATED AVERAGE GLUCOSE RESULT: 5.4 % — SIGNIFICANT CHANGE UP (ref 4–5.6)
A1C WITH ESTIMATED AVERAGE GLUCOSE RESULT: 5.6 % — SIGNIFICANT CHANGE UP (ref 4–5.6)
AGGLUTINATION: PRESENT — SIGNIFICANT CHANGE UP
ALBUMIN SERPL ELPH-MCNC: 2.5 G/DL — LOW (ref 3.3–5)
ALBUMIN SERPL ELPH-MCNC: 2.6 G/DL — LOW (ref 3.3–5)
ALBUMIN SERPL ELPH-MCNC: 2.6 G/DL — LOW (ref 3.3–5)
ALBUMIN SERPL ELPH-MCNC: 2.7 G/DL — LOW (ref 3.3–5)
ALBUMIN SERPL ELPH-MCNC: 2.8 G/DL — LOW (ref 3.3–5)
ALBUMIN SERPL ELPH-MCNC: 2.9 G/DL — LOW (ref 3.3–5)
ALBUMIN SERPL ELPH-MCNC: 3 G/DL — LOW (ref 3.3–5)
ALBUMIN SERPL ELPH-MCNC: 3.1 G/DL — LOW (ref 3.3–5)
ALBUMIN SERPL ELPH-MCNC: 3.1 G/DL — LOW (ref 3.6–5.5)
ALBUMIN SERPL ELPH-MCNC: 3.2 G/DL — LOW (ref 3.3–5)
ALBUMIN SERPL ELPH-MCNC: 3.3 G/DL — SIGNIFICANT CHANGE UP (ref 3.3–5)
ALBUMIN SERPL ELPH-MCNC: 3.4 G/DL — SIGNIFICANT CHANGE UP (ref 3.3–5)
ALBUMIN SERPL ELPH-MCNC: 3.5 G/DL — SIGNIFICANT CHANGE UP (ref 3.3–5)
ALBUMIN SERPL ELPH-MCNC: 3.6 G/DL — SIGNIFICANT CHANGE UP (ref 3.3–5)
ALBUMIN SERPL ELPH-MCNC: 3.7 G/DL — SIGNIFICANT CHANGE UP (ref 3.3–5)
ALBUMIN SERPL ELPH-MCNC: 3.8 G/DL — SIGNIFICANT CHANGE UP (ref 3.3–5)
ALBUMIN SERPL ELPH-MCNC: 3.9 G/DL — SIGNIFICANT CHANGE UP (ref 3.3–5)
ALBUMIN SERPL ELPH-MCNC: 4 G/DL — SIGNIFICANT CHANGE UP (ref 3.3–5)
ALBUMIN SERPL ELPH-MCNC: 4.1 G/DL — SIGNIFICANT CHANGE UP (ref 3.3–5)
ALBUMIN SERPL ELPH-MCNC: 4.2 G/DL — SIGNIFICANT CHANGE UP (ref 3.3–5)
ALBUMIN SERPL ELPH-MCNC: 4.3 G/DL — SIGNIFICANT CHANGE UP (ref 3.3–5)
ALBUMIN SERPL ELPH-MCNC: 4.4 G/DL — SIGNIFICANT CHANGE UP (ref 3.3–5)
ALBUMIN SERPL ELPH-MCNC: 4.5 G/DL — SIGNIFICANT CHANGE UP (ref 3.3–5)
ALBUMIN SERPL ELPH-MCNC: 4.6 G/DL — SIGNIFICANT CHANGE UP (ref 3.3–5)
ALBUMIN SERPL ELPH-MCNC: 4.6 G/DL — SIGNIFICANT CHANGE UP (ref 3.3–5)
ALBUMIN SERPL ELPH-MCNC: 4.7 G/DL — SIGNIFICANT CHANGE UP (ref 3.3–5)
ALBUMIN SERPL ELPH-MCNC: 4.7 G/DL — SIGNIFICANT CHANGE UP (ref 3.3–5)
ALBUMIN SERPL ELPH-MCNC: 4.9 G/DL — SIGNIFICANT CHANGE UP (ref 3.3–5)
ALBUMIN SERPL ELPH-MCNC: 5.5 G/DL — HIGH (ref 3.3–5)
ALBUMIN/GLOB SERPL ELPH: 0.6 RATIO — SIGNIFICANT CHANGE UP
ALP SERPL-CCNC: 100 U/L — SIGNIFICANT CHANGE UP (ref 40–120)
ALP SERPL-CCNC: 102 U/L — SIGNIFICANT CHANGE UP (ref 40–120)
ALP SERPL-CCNC: 105 U/L — SIGNIFICANT CHANGE UP (ref 40–120)
ALP SERPL-CCNC: 106 U/L — SIGNIFICANT CHANGE UP (ref 40–120)
ALP SERPL-CCNC: 106 U/L — SIGNIFICANT CHANGE UP (ref 40–120)
ALP SERPL-CCNC: 108 U/L — SIGNIFICANT CHANGE UP (ref 40–120)
ALP SERPL-CCNC: 108 U/L — SIGNIFICANT CHANGE UP (ref 40–120)
ALP SERPL-CCNC: 109 U/L — SIGNIFICANT CHANGE UP (ref 40–120)
ALP SERPL-CCNC: 110 U/L — SIGNIFICANT CHANGE UP (ref 40–120)
ALP SERPL-CCNC: 110 U/L — SIGNIFICANT CHANGE UP (ref 40–120)
ALP SERPL-CCNC: 111 U/L — SIGNIFICANT CHANGE UP (ref 40–120)
ALP SERPL-CCNC: 114 U/L — SIGNIFICANT CHANGE UP (ref 40–120)
ALP SERPL-CCNC: 114 U/L — SIGNIFICANT CHANGE UP (ref 40–120)
ALP SERPL-CCNC: 115 U/L — SIGNIFICANT CHANGE UP (ref 40–120)
ALP SERPL-CCNC: 116 U/L — SIGNIFICANT CHANGE UP (ref 40–120)
ALP SERPL-CCNC: 117 U/L — SIGNIFICANT CHANGE UP (ref 40–120)
ALP SERPL-CCNC: 118 U/L — SIGNIFICANT CHANGE UP (ref 40–120)
ALP SERPL-CCNC: 119 U/L — SIGNIFICANT CHANGE UP (ref 40–120)
ALP SERPL-CCNC: 119 U/L — SIGNIFICANT CHANGE UP (ref 40–120)
ALP SERPL-CCNC: 121 U/L — HIGH (ref 40–120)
ALP SERPL-CCNC: 122 U/L — HIGH (ref 40–120)
ALP SERPL-CCNC: 123 U/L — HIGH (ref 40–120)
ALP SERPL-CCNC: 125 U/L — HIGH (ref 40–120)
ALP SERPL-CCNC: 127 U/L — HIGH (ref 40–120)
ALP SERPL-CCNC: 131 U/L — HIGH (ref 40–120)
ALP SERPL-CCNC: 134 U/L — HIGH (ref 40–120)
ALP SERPL-CCNC: 135 U/L — HIGH (ref 40–120)
ALP SERPL-CCNC: 136 U/L — HIGH (ref 40–120)
ALP SERPL-CCNC: 137 U/L — HIGH (ref 40–120)
ALP SERPL-CCNC: 137 U/L — HIGH (ref 40–120)
ALP SERPL-CCNC: 138 U/L — HIGH (ref 40–120)
ALP SERPL-CCNC: 139 U/L — HIGH (ref 40–120)
ALP SERPL-CCNC: 140 U/L — HIGH (ref 40–120)
ALP SERPL-CCNC: 141 U/L — HIGH (ref 40–120)
ALP SERPL-CCNC: 143 U/L — HIGH (ref 40–120)
ALP SERPL-CCNC: 143 U/L — HIGH (ref 40–120)
ALP SERPL-CCNC: 145 U/L — HIGH (ref 40–120)
ALP SERPL-CCNC: 146 U/L — HIGH (ref 40–120)
ALP SERPL-CCNC: 147 U/L — HIGH (ref 40–120)
ALP SERPL-CCNC: 148 U/L — HIGH (ref 40–120)
ALP SERPL-CCNC: 149 U/L — HIGH (ref 40–120)
ALP SERPL-CCNC: 149 U/L — HIGH (ref 40–120)
ALP SERPL-CCNC: 150 U/L — HIGH (ref 40–120)
ALP SERPL-CCNC: 151 U/L — HIGH (ref 40–120)
ALP SERPL-CCNC: 152 U/L — HIGH (ref 40–120)
ALP SERPL-CCNC: 152 U/L — HIGH (ref 40–120)
ALP SERPL-CCNC: 153 U/L — HIGH (ref 40–120)
ALP SERPL-CCNC: 153 U/L — HIGH (ref 40–120)
ALP SERPL-CCNC: 155 U/L — HIGH (ref 40–120)
ALP SERPL-CCNC: 156 U/L — HIGH (ref 40–120)
ALP SERPL-CCNC: 157 U/L — HIGH (ref 40–120)
ALP SERPL-CCNC: 157 U/L — HIGH (ref 40–120)
ALP SERPL-CCNC: 160 U/L — HIGH (ref 40–120)
ALP SERPL-CCNC: 161 U/L — HIGH (ref 40–120)
ALP SERPL-CCNC: 164 U/L — HIGH (ref 40–120)
ALP SERPL-CCNC: 165 U/L — HIGH (ref 40–120)
ALP SERPL-CCNC: 165 U/L — HIGH (ref 40–120)
ALP SERPL-CCNC: 166 U/L — HIGH (ref 40–120)
ALP SERPL-CCNC: 167 U/L — HIGH (ref 40–120)
ALP SERPL-CCNC: 169 U/L — HIGH (ref 40–120)
ALP SERPL-CCNC: 170 U/L — HIGH (ref 40–120)
ALP SERPL-CCNC: 171 U/L — HIGH (ref 40–120)
ALP SERPL-CCNC: 174 U/L — HIGH (ref 40–120)
ALP SERPL-CCNC: 176 U/L — HIGH (ref 40–120)
ALP SERPL-CCNC: 177 U/L — HIGH (ref 40–120)
ALP SERPL-CCNC: 178 U/L — HIGH (ref 40–120)
ALP SERPL-CCNC: 178 U/L — HIGH (ref 40–120)
ALP SERPL-CCNC: 181 U/L — HIGH (ref 40–120)
ALP SERPL-CCNC: 181 U/L — HIGH (ref 40–120)
ALP SERPL-CCNC: 184 U/L — HIGH (ref 40–120)
ALP SERPL-CCNC: 185 U/L — HIGH (ref 40–120)
ALP SERPL-CCNC: 186 U/L — HIGH (ref 40–120)
ALP SERPL-CCNC: 188 U/L — HIGH (ref 40–120)
ALP SERPL-CCNC: 190 U/L — HIGH (ref 40–120)
ALP SERPL-CCNC: 190 U/L — HIGH (ref 40–120)
ALP SERPL-CCNC: 191 U/L — HIGH (ref 40–120)
ALP SERPL-CCNC: 192 U/L — HIGH (ref 40–120)
ALP SERPL-CCNC: 194 U/L — HIGH (ref 40–120)
ALP SERPL-CCNC: 195 U/L — HIGH (ref 40–120)
ALP SERPL-CCNC: 196 U/L — HIGH (ref 40–120)
ALP SERPL-CCNC: 196 U/L — HIGH (ref 40–120)
ALP SERPL-CCNC: 197 U/L — HIGH (ref 40–120)
ALP SERPL-CCNC: 201 U/L — HIGH (ref 40–120)
ALP SERPL-CCNC: 205 U/L — HIGH (ref 40–120)
ALP SERPL-CCNC: 206 U/L — HIGH (ref 40–120)
ALP SERPL-CCNC: 209 U/L — HIGH (ref 40–120)
ALP SERPL-CCNC: 209 U/L — HIGH (ref 40–120)
ALP SERPL-CCNC: 210 U/L — HIGH (ref 40–120)
ALP SERPL-CCNC: 211 U/L — HIGH (ref 40–120)
ALP SERPL-CCNC: 212 U/L — HIGH (ref 40–120)
ALP SERPL-CCNC: 213 U/L — HIGH (ref 40–120)
ALP SERPL-CCNC: 214 U/L — HIGH (ref 40–120)
ALP SERPL-CCNC: 215 U/L — HIGH (ref 40–120)
ALP SERPL-CCNC: 217 U/L — HIGH (ref 40–120)
ALP SERPL-CCNC: 219 U/L — HIGH (ref 40–120)
ALP SERPL-CCNC: 220 U/L — HIGH (ref 40–120)
ALP SERPL-CCNC: 223 U/L — HIGH (ref 40–120)
ALP SERPL-CCNC: 224 U/L — HIGH (ref 40–120)
ALP SERPL-CCNC: 227 U/L — HIGH (ref 40–120)
ALP SERPL-CCNC: 230 U/L — HIGH (ref 40–120)
ALP SERPL-CCNC: 233 U/L — HIGH (ref 40–120)
ALP SERPL-CCNC: 234 U/L — HIGH (ref 40–120)
ALP SERPL-CCNC: 234 U/L — HIGH (ref 40–120)
ALP SERPL-CCNC: 239 U/L — HIGH (ref 40–120)
ALP SERPL-CCNC: 242 U/L — HIGH (ref 40–120)
ALP SERPL-CCNC: 244 U/L — HIGH (ref 40–120)
ALP SERPL-CCNC: 250 U/L — HIGH (ref 40–120)
ALP SERPL-CCNC: 252 U/L — HIGH (ref 40–120)
ALP SERPL-CCNC: 253 U/L — HIGH (ref 40–120)
ALP SERPL-CCNC: 255 U/L — HIGH (ref 40–120)
ALP SERPL-CCNC: 258 U/L — HIGH (ref 40–120)
ALP SERPL-CCNC: 259 U/L — HIGH (ref 40–120)
ALP SERPL-CCNC: 260 U/L — HIGH (ref 40–120)
ALP SERPL-CCNC: 262 U/L — HIGH (ref 40–120)
ALP SERPL-CCNC: 270 U/L — HIGH (ref 40–120)
ALP SERPL-CCNC: 282 U/L — HIGH (ref 40–120)
ALP SERPL-CCNC: 288 U/L — HIGH (ref 40–120)
ALP SERPL-CCNC: 289 U/L — HIGH (ref 40–120)
ALP SERPL-CCNC: 291 U/L — HIGH (ref 40–120)
ALP SERPL-CCNC: 294 U/L — HIGH (ref 40–120)
ALP SERPL-CCNC: 295 U/L — HIGH (ref 40–120)
ALP SERPL-CCNC: 306 U/L — HIGH (ref 40–120)
ALP SERPL-CCNC: 308 U/L — HIGH (ref 40–120)
ALP SERPL-CCNC: 310 U/L — HIGH (ref 40–120)
ALP SERPL-CCNC: 310 U/L — HIGH (ref 40–120)
ALP SERPL-CCNC: 311 U/L — HIGH (ref 40–120)
ALP SERPL-CCNC: 312 U/L — HIGH (ref 40–120)
ALP SERPL-CCNC: 313 U/L — HIGH (ref 40–120)
ALP SERPL-CCNC: 316 U/L — HIGH (ref 40–120)
ALP SERPL-CCNC: 318 U/L — HIGH (ref 40–120)
ALP SERPL-CCNC: 319 U/L — HIGH (ref 40–120)
ALP SERPL-CCNC: 319 U/L — HIGH (ref 40–120)
ALP SERPL-CCNC: 327 U/L — HIGH (ref 40–120)
ALP SERPL-CCNC: 330 U/L — HIGH (ref 40–120)
ALP SERPL-CCNC: 332 U/L — HIGH (ref 40–120)
ALP SERPL-CCNC: 335 U/L — HIGH (ref 40–120)
ALP SERPL-CCNC: 50 U/L — SIGNIFICANT CHANGE UP (ref 40–120)
ALP SERPL-CCNC: 54 U/L — SIGNIFICANT CHANGE UP (ref 40–120)
ALP SERPL-CCNC: 55 U/L — SIGNIFICANT CHANGE UP (ref 40–120)
ALP SERPL-CCNC: 62 U/L — SIGNIFICANT CHANGE UP (ref 40–120)
ALP SERPL-CCNC: 72 U/L — SIGNIFICANT CHANGE UP (ref 40–120)
ALP SERPL-CCNC: 73 U/L — SIGNIFICANT CHANGE UP (ref 40–120)
ALP SERPL-CCNC: 75 U/L — SIGNIFICANT CHANGE UP (ref 40–120)
ALP SERPL-CCNC: 76 U/L — SIGNIFICANT CHANGE UP (ref 40–120)
ALP SERPL-CCNC: 81 U/L — SIGNIFICANT CHANGE UP (ref 40–120)
ALP SERPL-CCNC: 85 U/L — SIGNIFICANT CHANGE UP (ref 40–120)
ALP SERPL-CCNC: 94 U/L — SIGNIFICANT CHANGE UP (ref 40–120)
ALP SERPL-CCNC: 95 U/L — SIGNIFICANT CHANGE UP (ref 40–120)
ALP SERPL-CCNC: 96 U/L — SIGNIFICANT CHANGE UP (ref 40–120)
ALPHA1 GLOB SERPL ELPH-MCNC: 0.6 G/DL — HIGH (ref 0.1–0.4)
ALPHA2 GLOB SERPL ELPH-MCNC: 1 G/DL — SIGNIFICANT CHANGE UP (ref 0.5–1)
ALT FLD-CCNC: 10 U/L — SIGNIFICANT CHANGE UP (ref 10–45)
ALT FLD-CCNC: 102 U/L — HIGH (ref 10–45)
ALT FLD-CCNC: 11 U/L — SIGNIFICANT CHANGE UP (ref 10–45)
ALT FLD-CCNC: 115 U/L — HIGH (ref 10–45)
ALT FLD-CCNC: 116 U/L — HIGH (ref 10–45)
ALT FLD-CCNC: 118 U/L — HIGH (ref 10–45)
ALT FLD-CCNC: 12 U/L — SIGNIFICANT CHANGE UP (ref 10–45)
ALT FLD-CCNC: 12 U/L — SIGNIFICANT CHANGE UP (ref 10–45)
ALT FLD-CCNC: 125 U/L — HIGH (ref 10–45)
ALT FLD-CCNC: 126 U/L — HIGH (ref 10–45)
ALT FLD-CCNC: 13 U/L — SIGNIFICANT CHANGE UP (ref 10–45)
ALT FLD-CCNC: 131 U/L — HIGH (ref 10–45)
ALT FLD-CCNC: 132 U/L — HIGH (ref 10–45)
ALT FLD-CCNC: 14 U/L — SIGNIFICANT CHANGE UP (ref 10–45)
ALT FLD-CCNC: 14 U/L — SIGNIFICANT CHANGE UP (ref 10–45)
ALT FLD-CCNC: 149 U/L — HIGH (ref 10–45)
ALT FLD-CCNC: 15 U/L — SIGNIFICANT CHANGE UP (ref 10–45)
ALT FLD-CCNC: 150 U/L — HIGH (ref 10–45)
ALT FLD-CCNC: 150 U/L — HIGH (ref 10–45)
ALT FLD-CCNC: 157 U/L — HIGH (ref 10–45)
ALT FLD-CCNC: 16 U/L — SIGNIFICANT CHANGE UP (ref 10–45)
ALT FLD-CCNC: 17 U/L — SIGNIFICANT CHANGE UP (ref 10–45)
ALT FLD-CCNC: 171 U/L — HIGH (ref 10–45)
ALT FLD-CCNC: 178 U/L — HIGH (ref 10–45)
ALT FLD-CCNC: 179 U/L — HIGH (ref 10–45)
ALT FLD-CCNC: 18 U/L — SIGNIFICANT CHANGE UP (ref 10–45)
ALT FLD-CCNC: 187 U/L — HIGH (ref 10–45)
ALT FLD-CCNC: 19 U/L — SIGNIFICANT CHANGE UP (ref 10–45)
ALT FLD-CCNC: 192 U/L — HIGH (ref 10–45)
ALT FLD-CCNC: 194 U/L — HIGH (ref 10–45)
ALT FLD-CCNC: 20 U/L — SIGNIFICANT CHANGE UP (ref 10–45)
ALT FLD-CCNC: 21 U/L — SIGNIFICANT CHANGE UP (ref 10–45)
ALT FLD-CCNC: 210 U/L — HIGH (ref 10–45)
ALT FLD-CCNC: 22 U/L — SIGNIFICANT CHANGE UP (ref 10–45)
ALT FLD-CCNC: 23 U/L — SIGNIFICANT CHANGE UP (ref 10–45)
ALT FLD-CCNC: 230 U/L — HIGH (ref 10–45)
ALT FLD-CCNC: 237 U/L — HIGH (ref 10–45)
ALT FLD-CCNC: 24 U/L — SIGNIFICANT CHANGE UP (ref 10–45)
ALT FLD-CCNC: 26 U/L — SIGNIFICANT CHANGE UP (ref 10–45)
ALT FLD-CCNC: 26 U/L — SIGNIFICANT CHANGE UP (ref 10–45)
ALT FLD-CCNC: 27 U/L — SIGNIFICANT CHANGE UP (ref 10–45)
ALT FLD-CCNC: 28 U/L — SIGNIFICANT CHANGE UP (ref 10–45)
ALT FLD-CCNC: 29 U/L — SIGNIFICANT CHANGE UP (ref 10–45)
ALT FLD-CCNC: 29 U/L — SIGNIFICANT CHANGE UP (ref 10–45)
ALT FLD-CCNC: 30 U/L — SIGNIFICANT CHANGE UP (ref 10–45)
ALT FLD-CCNC: 30 U/L — SIGNIFICANT CHANGE UP (ref 10–45)
ALT FLD-CCNC: 32 U/L — SIGNIFICANT CHANGE UP (ref 10–45)
ALT FLD-CCNC: 33 U/L — SIGNIFICANT CHANGE UP (ref 10–45)
ALT FLD-CCNC: 33 U/L — SIGNIFICANT CHANGE UP (ref 10–45)
ALT FLD-CCNC: 34 U/L — SIGNIFICANT CHANGE UP (ref 10–45)
ALT FLD-CCNC: 34 U/L — SIGNIFICANT CHANGE UP (ref 10–45)
ALT FLD-CCNC: 35 U/L — SIGNIFICANT CHANGE UP (ref 10–45)
ALT FLD-CCNC: 37 U/L — SIGNIFICANT CHANGE UP (ref 10–45)
ALT FLD-CCNC: 37 U/L — SIGNIFICANT CHANGE UP (ref 10–45)
ALT FLD-CCNC: 38 U/L — SIGNIFICANT CHANGE UP (ref 10–45)
ALT FLD-CCNC: 38 U/L — SIGNIFICANT CHANGE UP (ref 10–45)
ALT FLD-CCNC: 39 U/L — SIGNIFICANT CHANGE UP (ref 10–45)
ALT FLD-CCNC: 40 U/L — SIGNIFICANT CHANGE UP (ref 10–45)
ALT FLD-CCNC: 42 U/L — SIGNIFICANT CHANGE UP (ref 10–45)
ALT FLD-CCNC: 42 U/L — SIGNIFICANT CHANGE UP (ref 10–45)
ALT FLD-CCNC: 43 U/L — SIGNIFICANT CHANGE UP (ref 10–45)
ALT FLD-CCNC: 43 U/L — SIGNIFICANT CHANGE UP (ref 10–45)
ALT FLD-CCNC: 45 U/L — SIGNIFICANT CHANGE UP (ref 10–45)
ALT FLD-CCNC: 46 U/L — HIGH (ref 10–45)
ALT FLD-CCNC: 47 U/L — HIGH (ref 10–45)
ALT FLD-CCNC: 48 U/L — HIGH (ref 10–45)
ALT FLD-CCNC: 49 U/L — HIGH (ref 10–45)
ALT FLD-CCNC: 49 U/L — HIGH (ref 10–45)
ALT FLD-CCNC: 5 U/L — LOW (ref 10–45)
ALT FLD-CCNC: 52 U/L — HIGH (ref 10–45)
ALT FLD-CCNC: 53 U/L — HIGH (ref 10–45)
ALT FLD-CCNC: 53 U/L — HIGH (ref 10–45)
ALT FLD-CCNC: 55 U/L — HIGH (ref 10–45)
ALT FLD-CCNC: 56 U/L — HIGH (ref 10–45)
ALT FLD-CCNC: 57 U/L — HIGH (ref 10–45)
ALT FLD-CCNC: 6 U/L — LOW (ref 10–45)
ALT FLD-CCNC: 6 U/L — LOW (ref 10–45)
ALT FLD-CCNC: 60 U/L — HIGH (ref 10–45)
ALT FLD-CCNC: 60 U/L — HIGH (ref 10–45)
ALT FLD-CCNC: 63 U/L — HIGH (ref 10–45)
ALT FLD-CCNC: 65 U/L — HIGH (ref 10–45)
ALT FLD-CCNC: 66 U/L — HIGH (ref 10–45)
ALT FLD-CCNC: 68 U/L — HIGH (ref 10–45)
ALT FLD-CCNC: 7 U/L — LOW (ref 10–45)
ALT FLD-CCNC: 73 U/L — HIGH (ref 10–45)
ALT FLD-CCNC: 75 U/L — HIGH (ref 10–45)
ALT FLD-CCNC: 8 U/L — LOW (ref 10–45)
ALT FLD-CCNC: 82 U/L — HIGH (ref 10–45)
ALT FLD-CCNC: 83 U/L — HIGH (ref 10–45)
ALT FLD-CCNC: 85 U/L — HIGH (ref 10–45)
ALT FLD-CCNC: 87 U/L — HIGH (ref 10–45)
ALT FLD-CCNC: 87 U/L — HIGH (ref 10–45)
ALT FLD-CCNC: 89 U/L — HIGH (ref 10–45)
ALT FLD-CCNC: 9 U/L — LOW (ref 10–45)
ALT FLD-CCNC: 93 U/L — HIGH (ref 10–45)
ALT FLD-CCNC: 94 U/L — HIGH (ref 10–45)
ALT FLD-CCNC: 97 U/L — HIGH (ref 10–45)
AMYLASE P1 CFR SERPL: 103 U/L — SIGNIFICANT CHANGE UP (ref 25–125)
AMYLASE P1 CFR SERPL: 134 U/L — HIGH (ref 25–125)
AMYLASE P1 CFR SERPL: 70 U/L — SIGNIFICANT CHANGE UP (ref 25–125)
AMYLASE P1 CFR SERPL: 75 U/L — SIGNIFICANT CHANGE UP (ref 25–125)
AMYLASE P1 CFR SERPL: 89 U/L — SIGNIFICANT CHANGE UP (ref 25–125)
ANA PAT FLD IF-IMP: ABNORMAL
ANA TITR SER: ABNORMAL
ANION GAP SERPL CALC-SCNC: 10 MMOL/L — SIGNIFICANT CHANGE UP (ref 5–17)
ANION GAP SERPL CALC-SCNC: 11 MMOL/L — SIGNIFICANT CHANGE UP (ref 5–17)
ANION GAP SERPL CALC-SCNC: 12 MMOL/L — SIGNIFICANT CHANGE UP (ref 5–17)
ANION GAP SERPL CALC-SCNC: 13 MMOL/L — SIGNIFICANT CHANGE UP (ref 5–17)
ANION GAP SERPL CALC-SCNC: 14 MMOL/L — SIGNIFICANT CHANGE UP (ref 5–17)
ANION GAP SERPL CALC-SCNC: 15 MMOL/L — SIGNIFICANT CHANGE UP (ref 5–17)
ANION GAP SERPL CALC-SCNC: 16 MMOL/L — SIGNIFICANT CHANGE UP (ref 5–17)
ANION GAP SERPL CALC-SCNC: 17 MMOL/L — SIGNIFICANT CHANGE UP (ref 5–17)
ANION GAP SERPL CALC-SCNC: 17 MMOL/L — SIGNIFICANT CHANGE UP (ref 5–17)
ANION GAP SERPL CALC-SCNC: 19 MMOL/L — HIGH (ref 5–17)
ANION GAP SERPL CALC-SCNC: 7 MMOL/L — SIGNIFICANT CHANGE UP (ref 5–17)
ANION GAP SERPL CALC-SCNC: 8 MMOL/L — SIGNIFICANT CHANGE UP (ref 5–17)
ANION GAP SERPL CALC-SCNC: 9 MMOL/L — SIGNIFICANT CHANGE UP (ref 5–17)
ANISOCYTOSIS BLD QL: SIGNIFICANT CHANGE UP
APPEARANCE UR: ABNORMAL
APPEARANCE UR: CLEAR — SIGNIFICANT CHANGE UP
APTT BLD: 20.4 SEC — LOW (ref 27.5–35.5)
APTT BLD: 21.2 SEC — LOW (ref 27.5–35.5)
APTT BLD: 21.5 SEC — LOW (ref 27.5–35.5)
APTT BLD: 23.1 SEC — LOW (ref 27.5–35.5)
APTT BLD: 24.8 SEC — LOW (ref 27.5–35.5)
APTT BLD: 27.7 SEC — SIGNIFICANT CHANGE UP (ref 27.5–35.5)
APTT BLD: 28.1 SEC — SIGNIFICANT CHANGE UP (ref 27.5–35.5)
APTT BLD: 28.4 SEC — SIGNIFICANT CHANGE UP (ref 27.5–35.5)
APTT BLD: 29.2 SEC — SIGNIFICANT CHANGE UP (ref 27.5–35.5)
APTT BLD: 29.7 SEC — SIGNIFICANT CHANGE UP (ref 27.5–35.5)
APTT BLD: 29.8 SEC — SIGNIFICANT CHANGE UP (ref 27.5–35.5)
APTT BLD: 29.9 SEC — SIGNIFICANT CHANGE UP (ref 27.5–35.5)
APTT BLD: 29.9 SEC — SIGNIFICANT CHANGE UP (ref 27.5–35.5)
APTT BLD: 30.1 SEC — SIGNIFICANT CHANGE UP (ref 27.5–35.5)
APTT BLD: 30.4 SEC — SIGNIFICANT CHANGE UP (ref 27.5–35.5)
APTT BLD: 30.6 SEC — SIGNIFICANT CHANGE UP (ref 27.5–35.5)
APTT BLD: 30.6 SEC — SIGNIFICANT CHANGE UP (ref 27.5–35.5)
APTT BLD: 30.7 SEC — SIGNIFICANT CHANGE UP (ref 27.5–35.5)
APTT BLD: 31 SEC — SIGNIFICANT CHANGE UP (ref 27.5–35.5)
APTT BLD: 31.5 SEC — SIGNIFICANT CHANGE UP (ref 27.5–35.5)
APTT BLD: 31.8 SEC — SIGNIFICANT CHANGE UP (ref 27.5–35.5)
APTT BLD: 32.4 SEC — SIGNIFICANT CHANGE UP (ref 27.5–35.5)
APTT BLD: 34.3 SEC — SIGNIFICANT CHANGE UP (ref 27.5–35.5)
APTT BLD: 34.5 SEC — SIGNIFICANT CHANGE UP (ref 27.5–35.5)
APTT BLD: 34.9 SEC — SIGNIFICANT CHANGE UP (ref 27.5–35.5)
APTT BLD: 35.4 SEC — SIGNIFICANT CHANGE UP (ref 27.5–35.5)
APTT BLD: 35.5 SEC — SIGNIFICANT CHANGE UP (ref 27.5–35.5)
APTT BLD: 35.8 SEC — HIGH (ref 27.5–35.5)
APTT BLD: 35.9 SEC — HIGH (ref 27.5–35.5)
APTT BLD: 36.1 SEC — HIGH (ref 27.5–35.5)
APTT BLD: 37.6 SEC — HIGH (ref 27.5–35.5)
APTT BLD: 37.9 SEC — HIGH (ref 27.5–35.5)
APTT BLD: 38 SEC — HIGH (ref 27.5–35.5)
APTT BLD: 38.8 SEC — HIGH (ref 27.5–35.5)
APTT BLD: 38.9 SEC — HIGH (ref 27.5–35.5)
APTT BLD: 41.2 SEC — HIGH (ref 27.5–35.5)
APTT BLD: 41.7 SEC — HIGH (ref 27.5–35.5)
APTT BLD: 42.1 SEC — HIGH (ref 27.5–35.5)
APTT BLD: 42.1 SEC — HIGH (ref 27.5–35.5)
APTT BLD: 42.6 SEC — HIGH (ref 27.5–35.5)
APTT BLD: 42.8 SEC — HIGH (ref 27.5–35.5)
APTT BLD: 43 SEC — HIGH (ref 27.5–35.5)
APTT BLD: 43.2 SEC — HIGH (ref 27.5–35.5)
APTT BLD: 43.7 SEC — HIGH (ref 27.5–35.5)
APTT BLD: 44.1 SEC — HIGH (ref 27.5–35.5)
APTT BLD: 44.2 SEC — HIGH (ref 27.5–35.5)
APTT BLD: 44.2 SEC — HIGH (ref 27.5–35.5)
APTT BLD: 44.6 SEC — HIGH (ref 27.5–35.5)
APTT BLD: 44.7 SEC — HIGH (ref 27.5–35.5)
APTT BLD: 45.2 SEC — HIGH (ref 27.5–35.5)
APTT BLD: 45.6 SEC — HIGH (ref 27.5–35.5)
APTT BLD: 45.6 SEC — HIGH (ref 27.5–35.5)
APTT BLD: 45.9 SEC — HIGH (ref 27.5–35.5)
APTT BLD: 47 SEC — HIGH (ref 27.5–35.5)
APTT BLD: 47.3 SEC — HIGH (ref 27.5–35.5)
APTT BLD: 47.7 SEC — HIGH (ref 27.5–35.5)
APTT BLD: 48.7 SEC — HIGH (ref 27.5–35.5)
APTT BLD: 48.7 SEC — HIGH (ref 27.5–35.5)
APTT BLD: 49.6 SEC — HIGH (ref 27.5–35.5)
APTT BLD: 49.6 SEC — HIGH (ref 27.5–35.5)
APTT BLD: 50.2 SEC — HIGH (ref 27.5–35.5)
APTT BLD: 50.8 SEC — HIGH (ref 27.5–35.5)
APTT BLD: 51 SEC — HIGH (ref 27.5–35.5)
APTT BLD: 52 SEC — HIGH (ref 27.5–35.5)
APTT BLD: 52.3 SEC — HIGH (ref 27.5–35.5)
APTT BLD: 52.8 SEC — HIGH (ref 27.5–35.5)
APTT BLD: 52.8 SEC — HIGH (ref 27.5–35.5)
APTT BLD: 53 SEC — HIGH (ref 27.5–35.5)
APTT BLD: 53.7 SEC — HIGH (ref 27.5–35.5)
APTT BLD: 54 SEC — HIGH (ref 27.5–35.5)
APTT BLD: 54.2 SEC — HIGH (ref 27.5–35.5)
APTT BLD: 56.2 SEC — HIGH (ref 27.5–35.5)
APTT BLD: 57.3 SEC — HIGH (ref 27.5–35.5)
APTT BLD: 57.6 SEC — HIGH (ref 27.5–35.5)
APTT BLD: 57.7 SEC — HIGH (ref 27.5–35.5)
APTT BLD: 58 SEC — HIGH (ref 27.5–35.5)
APTT BLD: 58.2 SEC — HIGH (ref 27.5–35.5)
APTT BLD: 58.4 SEC — HIGH (ref 27.5–35.5)
APTT BLD: 58.5 SEC — HIGH (ref 27.5–35.5)
APTT BLD: 58.5 SEC — HIGH (ref 27.5–35.5)
APTT BLD: 59.7 SEC — HIGH (ref 27.5–35.5)
APTT BLD: 60.7 SEC — HIGH (ref 27.5–35.5)
APTT BLD: 61.1 SEC — HIGH (ref 27.5–35.5)
APTT BLD: 61.5 SEC — HIGH (ref 27.5–35.5)
APTT BLD: 62.6 SEC — HIGH (ref 27.5–35.5)
APTT BLD: 62.8 SEC — HIGH (ref 27.5–35.5)
APTT BLD: 64.3 SEC — HIGH (ref 27.5–35.5)
APTT BLD: 67.3 SEC — HIGH (ref 27.5–35.5)
APTT BLD: 68 SEC — HIGH (ref 27.5–35.5)
APTT BLD: 68.9 SEC — HIGH (ref 27.5–35.5)
APTT BLD: 72 SEC — HIGH (ref 27.5–35.5)
APTT BLD: 83.7 SEC — HIGH (ref 27.5–35.5)
AST SERPL-CCNC: 10 U/L — SIGNIFICANT CHANGE UP (ref 10–40)
AST SERPL-CCNC: 108 U/L — HIGH (ref 10–40)
AST SERPL-CCNC: 11 U/L — SIGNIFICANT CHANGE UP (ref 10–40)
AST SERPL-CCNC: 117 U/L — HIGH (ref 10–40)
AST SERPL-CCNC: 118 U/L — HIGH (ref 10–40)
AST SERPL-CCNC: 12 U/L — SIGNIFICANT CHANGE UP (ref 10–40)
AST SERPL-CCNC: 13 U/L — SIGNIFICANT CHANGE UP (ref 10–40)
AST SERPL-CCNC: 14 U/L — SIGNIFICANT CHANGE UP (ref 10–40)
AST SERPL-CCNC: 142 U/L — HIGH (ref 10–40)
AST SERPL-CCNC: 15 U/L — SIGNIFICANT CHANGE UP (ref 10–40)
AST SERPL-CCNC: 16 U/L — SIGNIFICANT CHANGE UP (ref 10–40)
AST SERPL-CCNC: 17 U/L — SIGNIFICANT CHANGE UP (ref 10–40)
AST SERPL-CCNC: 18 U/L — SIGNIFICANT CHANGE UP (ref 10–40)
AST SERPL-CCNC: 19 U/L — SIGNIFICANT CHANGE UP (ref 10–40)
AST SERPL-CCNC: 195 U/L — HIGH (ref 10–40)
AST SERPL-CCNC: 20 U/L — SIGNIFICANT CHANGE UP (ref 10–40)
AST SERPL-CCNC: 209 U/L — HIGH (ref 10–40)
AST SERPL-CCNC: 21 U/L — SIGNIFICANT CHANGE UP (ref 10–40)
AST SERPL-CCNC: 22 U/L — SIGNIFICANT CHANGE UP (ref 10–40)
AST SERPL-CCNC: 23 U/L — SIGNIFICANT CHANGE UP (ref 10–40)
AST SERPL-CCNC: 24 U/L — SIGNIFICANT CHANGE UP (ref 10–40)
AST SERPL-CCNC: 25 U/L — SIGNIFICANT CHANGE UP (ref 10–40)
AST SERPL-CCNC: 26 U/L — SIGNIFICANT CHANGE UP (ref 10–40)
AST SERPL-CCNC: 27 U/L — SIGNIFICANT CHANGE UP (ref 10–40)
AST SERPL-CCNC: 28 U/L — SIGNIFICANT CHANGE UP (ref 10–40)
AST SERPL-CCNC: 29 U/L — SIGNIFICANT CHANGE UP (ref 10–40)
AST SERPL-CCNC: 30 U/L — SIGNIFICANT CHANGE UP (ref 10–40)
AST SERPL-CCNC: 31 U/L — SIGNIFICANT CHANGE UP (ref 10–40)
AST SERPL-CCNC: 32 U/L — SIGNIFICANT CHANGE UP (ref 10–40)
AST SERPL-CCNC: 33 U/L — SIGNIFICANT CHANGE UP (ref 10–40)
AST SERPL-CCNC: 33 U/L — SIGNIFICANT CHANGE UP (ref 10–40)
AST SERPL-CCNC: 34 U/L — SIGNIFICANT CHANGE UP (ref 10–40)
AST SERPL-CCNC: 35 U/L — SIGNIFICANT CHANGE UP (ref 10–40)
AST SERPL-CCNC: 36 U/L — SIGNIFICANT CHANGE UP (ref 10–40)
AST SERPL-CCNC: 36 U/L — SIGNIFICANT CHANGE UP (ref 10–40)
AST SERPL-CCNC: 37 U/L — SIGNIFICANT CHANGE UP (ref 10–40)
AST SERPL-CCNC: 39 U/L — SIGNIFICANT CHANGE UP (ref 10–40)
AST SERPL-CCNC: 39 U/L — SIGNIFICANT CHANGE UP (ref 10–40)
AST SERPL-CCNC: 41 U/L — HIGH (ref 10–40)
AST SERPL-CCNC: 42 U/L — HIGH (ref 10–40)
AST SERPL-CCNC: 43 U/L — HIGH (ref 10–40)
AST SERPL-CCNC: 44 U/L — HIGH (ref 10–40)
AST SERPL-CCNC: 47 U/L — HIGH (ref 10–40)
AST SERPL-CCNC: 48 U/L — HIGH (ref 10–40)
AST SERPL-CCNC: 48 U/L — HIGH (ref 10–40)
AST SERPL-CCNC: 50 U/L — HIGH (ref 10–40)
AST SERPL-CCNC: 51 U/L — HIGH (ref 10–40)
AST SERPL-CCNC: 53 U/L — HIGH (ref 10–40)
AST SERPL-CCNC: 53 U/L — HIGH (ref 10–40)
AST SERPL-CCNC: 57 U/L — HIGH (ref 10–40)
AST SERPL-CCNC: 58 U/L — HIGH (ref 10–40)
AST SERPL-CCNC: 59 U/L — HIGH (ref 10–40)
AST SERPL-CCNC: 60 U/L — HIGH (ref 10–40)
AST SERPL-CCNC: 63 U/L — HIGH (ref 10–40)
AST SERPL-CCNC: 64 U/L — HIGH (ref 10–40)
AST SERPL-CCNC: 67 U/L — HIGH (ref 10–40)
AST SERPL-CCNC: 68 U/L — HIGH (ref 10–40)
AST SERPL-CCNC: 70 U/L — HIGH (ref 10–40)
AST SERPL-CCNC: 71 U/L — HIGH (ref 10–40)
AST SERPL-CCNC: 71 U/L — HIGH (ref 10–40)
AST SERPL-CCNC: 73 U/L — HIGH (ref 10–40)
AST SERPL-CCNC: 74 U/L — HIGH (ref 10–40)
AST SERPL-CCNC: 74 U/L — HIGH (ref 10–40)
AST SERPL-CCNC: 75 U/L — HIGH (ref 10–40)
AST SERPL-CCNC: 76 U/L — HIGH (ref 10–40)
AST SERPL-CCNC: 79 U/L — HIGH (ref 10–40)
AST SERPL-CCNC: 79 U/L — HIGH (ref 10–40)
AST SERPL-CCNC: 82 U/L — HIGH (ref 10–40)
AST SERPL-CCNC: 86 U/L — HIGH (ref 10–40)
AST SERPL-CCNC: 86 U/L — HIGH (ref 10–40)
AST SERPL-CCNC: 87 U/L — HIGH (ref 10–40)
AST SERPL-CCNC: 97 U/L — HIGH (ref 10–40)
AUTO DIFF PNL BLD: NEGATIVE — SIGNIFICANT CHANGE UP
B-GLOBULIN SERPL ELPH-MCNC: 1.1 G/DL — HIGH (ref 0.5–1)
BACTERIA # UR AUTO: NEGATIVE — SIGNIFICANT CHANGE UP
BACTERIA WND CULT: NORMAL
BASE EXCESS BLDA CALC-SCNC: 2.2 MMOL/L — SIGNIFICANT CHANGE UP (ref -2–3)
BASE EXCESS BLDMV CALC-SCNC: -2.8 MMOL/L — SIGNIFICANT CHANGE UP (ref -3–3)
BASE EXCESS BLDMV CALC-SCNC: -2.8 MMOL/L — SIGNIFICANT CHANGE UP (ref -3–3)
BASE EXCESS BLDMV CALC-SCNC: -4.8 MMOL/L — LOW (ref -3–3)
BASE EXCESS BLDV CALC-SCNC: -0.1 MMOL/L — SIGNIFICANT CHANGE UP (ref -2–2)
BASE EXCESS BLDV CALC-SCNC: -0.9 MMOL/L — SIGNIFICANT CHANGE UP (ref -2–2)
BASE EXCESS BLDV CALC-SCNC: -0.9 MMOL/L — SIGNIFICANT CHANGE UP (ref -2–2)
BASE EXCESS BLDV CALC-SCNC: -1 MMOL/L — SIGNIFICANT CHANGE UP (ref -2–2)
BASE EXCESS BLDV CALC-SCNC: -1.3 MMOL/L — SIGNIFICANT CHANGE UP (ref -2–2)
BASE EXCESS BLDV CALC-SCNC: -1.3 MMOL/L — SIGNIFICANT CHANGE UP (ref -2–2)
BASE EXCESS BLDV CALC-SCNC: -1.4 MMOL/L — SIGNIFICANT CHANGE UP (ref -2–2)
BASE EXCESS BLDV CALC-SCNC: -2.2 MMOL/L — LOW (ref -2–2)
BASE EXCESS BLDV CALC-SCNC: -2.4 MMOL/L — LOW (ref -2–2)
BASE EXCESS BLDV CALC-SCNC: -2.5 MMOL/L — LOW (ref -2–2)
BASE EXCESS BLDV CALC-SCNC: -2.7 MMOL/L — LOW (ref -2–2)
BASE EXCESS BLDV CALC-SCNC: -3.3 MMOL/L — LOW (ref -2–2)
BASE EXCESS BLDV CALC-SCNC: -3.9 MMOL/L — LOW (ref -2–2)
BASE EXCESS BLDV CALC-SCNC: -5.6 MMOL/L — LOW (ref -2–2)
BASE EXCESS BLDV CALC-SCNC: -5.6 MMOL/L — LOW (ref -2–2)
BASE EXCESS BLDV CALC-SCNC: 0 MMOL/L — SIGNIFICANT CHANGE UP (ref -2–2)
BASE EXCESS BLDV CALC-SCNC: 0.3 MMOL/L — SIGNIFICANT CHANGE UP (ref -2–2)
BASE EXCESS BLDV CALC-SCNC: 0.5 MMOL/L — SIGNIFICANT CHANGE UP (ref -2–2)
BASE EXCESS BLDV CALC-SCNC: 0.6 MMOL/L — SIGNIFICANT CHANGE UP (ref -2–2)
BASE EXCESS BLDV CALC-SCNC: 1.2 MMOL/L — SIGNIFICANT CHANGE UP (ref -2–2)
BASE EXCESS BLDV CALC-SCNC: 1.9 MMOL/L — SIGNIFICANT CHANGE UP (ref -2–2)
BASOPHILS # BLD AUTO: 0 K/UL — SIGNIFICANT CHANGE UP (ref 0–0.2)
BASOPHILS # BLD AUTO: 0.01 K/UL — SIGNIFICANT CHANGE UP (ref 0–0.2)
BASOPHILS # BLD AUTO: 0.01 K/UL — SIGNIFICANT CHANGE UP (ref 0–0.2)
BASOPHILS # BLD AUTO: 0.02 K/UL — SIGNIFICANT CHANGE UP (ref 0–0.2)
BASOPHILS # BLD AUTO: 0.02 K/UL — SIGNIFICANT CHANGE UP (ref 0–0.2)
BASOPHILS # BLD AUTO: 0.03 K/UL — SIGNIFICANT CHANGE UP (ref 0–0.2)
BASOPHILS # BLD AUTO: 0.03 K/UL — SIGNIFICANT CHANGE UP (ref 0–0.2)
BASOPHILS # BLD AUTO: 0.05 K/UL — SIGNIFICANT CHANGE UP (ref 0–0.2)
BASOPHILS # BLD AUTO: 0.08 K/UL — SIGNIFICANT CHANGE UP (ref 0–0.2)
BASOPHILS NFR BLD AUTO: 0 % — SIGNIFICANT CHANGE UP (ref 0–2)
BASOPHILS NFR BLD AUTO: 0.1 % — SIGNIFICANT CHANGE UP (ref 0–2)
BASOPHILS NFR BLD AUTO: 0.1 % — SIGNIFICANT CHANGE UP (ref 0–2)
BASOPHILS NFR BLD AUTO: 0.2 % — SIGNIFICANT CHANGE UP (ref 0–2)
BASOPHILS NFR BLD AUTO: 0.3 % — SIGNIFICANT CHANGE UP (ref 0–2)
BASOPHILS NFR BLD AUTO: 0.6 % — SIGNIFICANT CHANGE UP (ref 0–2)
BASOPHILS NFR BLD AUTO: 1.3 % — SIGNIFICANT CHANGE UP (ref 0–2)
BILIRUB DIRECT SERPL-MCNC: 2.4 MG/DL — HIGH (ref 0–0.2)
BILIRUB SERPL-MCNC: 0.2 MG/DL — SIGNIFICANT CHANGE UP (ref 0.2–1.2)
BILIRUB SERPL-MCNC: 0.3 MG/DL — SIGNIFICANT CHANGE UP (ref 0.2–1.2)
BILIRUB SERPL-MCNC: 0.4 MG/DL — SIGNIFICANT CHANGE UP (ref 0.2–1.2)
BILIRUB SERPL-MCNC: 0.5 MG/DL — SIGNIFICANT CHANGE UP (ref 0.2–1.2)
BILIRUB SERPL-MCNC: 0.6 MG/DL — SIGNIFICANT CHANGE UP (ref 0.2–1.2)
BILIRUB SERPL-MCNC: 0.7 MG/DL — SIGNIFICANT CHANGE UP (ref 0.2–1.2)
BILIRUB SERPL-MCNC: 0.8 MG/DL — SIGNIFICANT CHANGE UP (ref 0.2–1.2)
BILIRUB SERPL-MCNC: 0.9 MG/DL — SIGNIFICANT CHANGE UP (ref 0.2–1.2)
BILIRUB SERPL-MCNC: 1 MG/DL — SIGNIFICANT CHANGE UP (ref 0.2–1.2)
BILIRUB SERPL-MCNC: 1 MG/DL — SIGNIFICANT CHANGE UP (ref 0.2–1.2)
BILIRUB SERPL-MCNC: 1.1 MG/DL — SIGNIFICANT CHANGE UP (ref 0.2–1.2)
BILIRUB SERPL-MCNC: 1.2 MG/DL — SIGNIFICANT CHANGE UP (ref 0.2–1.2)
BILIRUB SERPL-MCNC: 1.3 MG/DL — HIGH (ref 0.2–1.2)
BILIRUB SERPL-MCNC: 1.4 MG/DL — HIGH (ref 0.2–1.2)
BILIRUB SERPL-MCNC: 1.5 MG/DL — HIGH (ref 0.2–1.2)
BILIRUB SERPL-MCNC: 1.6 MG/DL — HIGH (ref 0.2–1.2)
BILIRUB SERPL-MCNC: 1.7 MG/DL — HIGH (ref 0.2–1.2)
BILIRUB SERPL-MCNC: 1.8 MG/DL — HIGH (ref 0.2–1.2)
BILIRUB SERPL-MCNC: 2 MG/DL — HIGH (ref 0.2–1.2)
BILIRUB SERPL-MCNC: 2.1 MG/DL — HIGH (ref 0.2–1.2)
BILIRUB SERPL-MCNC: 2.1 MG/DL — HIGH (ref 0.2–1.2)
BILIRUB SERPL-MCNC: 2.3 MG/DL — HIGH (ref 0.2–1.2)
BILIRUB SERPL-MCNC: 2.4 MG/DL — HIGH (ref 0.2–1.2)
BILIRUB SERPL-MCNC: 2.5 MG/DL — HIGH (ref 0.2–1.2)
BILIRUB SERPL-MCNC: 3.2 MG/DL — HIGH (ref 0.2–1.2)
BILIRUB SERPL-MCNC: 3.6 MG/DL — HIGH (ref 0.2–1.2)
BILIRUB SERPL-MCNC: 3.6 MG/DL — HIGH (ref 0.2–1.2)
BILIRUB SERPL-MCNC: 4 MG/DL — HIGH (ref 0.2–1.2)
BILIRUB UR-MCNC: ABNORMAL
BILIRUB UR-MCNC: ABNORMAL
BILIRUB UR-MCNC: NEGATIVE — SIGNIFICANT CHANGE UP
BLD GP AB SCN SERPL QL: NEGATIVE — SIGNIFICANT CHANGE UP
BLOOD GAS VENOUS - CREATININE: SIGNIFICANT CHANGE UP MG/DL (ref 0.5–1.3)
BUN SERPL-MCNC: 10 MG/DL — SIGNIFICANT CHANGE UP (ref 7–23)
BUN SERPL-MCNC: 11 MG/DL — SIGNIFICANT CHANGE UP (ref 7–23)
BUN SERPL-MCNC: 12 MG/DL — SIGNIFICANT CHANGE UP (ref 7–23)
BUN SERPL-MCNC: 13 MG/DL — SIGNIFICANT CHANGE UP (ref 7–23)
BUN SERPL-MCNC: 14 MG/DL — SIGNIFICANT CHANGE UP (ref 7–23)
BUN SERPL-MCNC: 15 MG/DL — SIGNIFICANT CHANGE UP (ref 7–23)
BUN SERPL-MCNC: 16 MG/DL — SIGNIFICANT CHANGE UP (ref 7–23)
BUN SERPL-MCNC: 17 MG/DL — SIGNIFICANT CHANGE UP (ref 7–23)
BUN SERPL-MCNC: 18 MG/DL — SIGNIFICANT CHANGE UP (ref 7–23)
BUN SERPL-MCNC: 19 MG/DL — SIGNIFICANT CHANGE UP (ref 7–23)
BUN SERPL-MCNC: 20 MG/DL — SIGNIFICANT CHANGE UP (ref 7–23)
BUN SERPL-MCNC: 21 MG/DL — SIGNIFICANT CHANGE UP (ref 7–23)
BUN SERPL-MCNC: 22 MG/DL — SIGNIFICANT CHANGE UP (ref 7–23)
BUN SERPL-MCNC: 23 MG/DL — SIGNIFICANT CHANGE UP (ref 7–23)
BUN SERPL-MCNC: 23 MG/DL — SIGNIFICANT CHANGE UP (ref 7–23)
BUN SERPL-MCNC: 24 MG/DL — HIGH (ref 7–23)
BUN SERPL-MCNC: 25 MG/DL — HIGH (ref 7–23)
BUN SERPL-MCNC: 26 MG/DL — HIGH (ref 7–23)
BUN SERPL-MCNC: 27 MG/DL — HIGH (ref 7–23)
BUN SERPL-MCNC: 28 MG/DL — HIGH (ref 7–23)
BUN SERPL-MCNC: 32 MG/DL — HIGH (ref 7–23)
BUN SERPL-MCNC: 33 MG/DL — HIGH (ref 7–23)
BUN SERPL-MCNC: 34 MG/DL — HIGH (ref 7–23)
BUN SERPL-MCNC: 36 MG/DL — HIGH (ref 7–23)
BUN SERPL-MCNC: 37 MG/DL — HIGH (ref 7–23)
BUN SERPL-MCNC: 37 MG/DL — HIGH (ref 7–23)
BUN SERPL-MCNC: 4 MG/DL — LOW (ref 7–23)
BUN SERPL-MCNC: 4 MG/DL — LOW (ref 7–23)
BUN SERPL-MCNC: 40 MG/DL — HIGH (ref 7–23)
BUN SERPL-MCNC: 41 MG/DL — HIGH (ref 7–23)
BUN SERPL-MCNC: 45 MG/DL — HIGH (ref 7–23)
BUN SERPL-MCNC: 45 MG/DL — HIGH (ref 7–23)
BUN SERPL-MCNC: 5 MG/DL — LOW (ref 7–23)
BUN SERPL-MCNC: 50 MG/DL — HIGH (ref 7–23)
BUN SERPL-MCNC: 50 MG/DL — HIGH (ref 7–23)
BUN SERPL-MCNC: 56 MG/DL — HIGH (ref 7–23)
BUN SERPL-MCNC: 6 MG/DL — LOW (ref 7–23)
BUN SERPL-MCNC: 7 MG/DL — SIGNIFICANT CHANGE UP (ref 7–23)
BUN SERPL-MCNC: 8 MG/DL — SIGNIFICANT CHANGE UP (ref 7–23)
BUN SERPL-MCNC: 9 MG/DL — SIGNIFICANT CHANGE UP (ref 7–23)
BUN SERPL-MCNC: <4 MG/DL — LOW (ref 7–23)
C DIFF GDH STL QL: POSITIVE — SIGNIFICANT CHANGE UP
C DIFF GDH STL QL: SIGNIFICANT CHANGE UP
C-ANCA SER-ACNC: NEGATIVE — SIGNIFICANT CHANGE UP
CA-I SERPL-SCNC: 1.03 MMOL/L — LOW (ref 1.12–1.3)
CA-I SERPL-SCNC: 1.08 MMOL/L — LOW (ref 1.12–1.3)
CA-I SERPL-SCNC: 1.13 MMOL/L — SIGNIFICANT CHANGE UP (ref 1.12–1.3)
CA-I SERPL-SCNC: 1.15 MMOL/L — SIGNIFICANT CHANGE UP (ref 1.12–1.3)
CA-I SERPL-SCNC: 1.16 MMOL/L — SIGNIFICANT CHANGE UP (ref 1.12–1.3)
CA-I SERPL-SCNC: 1.22 MMOL/L — SIGNIFICANT CHANGE UP (ref 1.12–1.3)
CA-I SERPL-SCNC: 1.24 MMOL/L — SIGNIFICANT CHANGE UP (ref 1.12–1.3)
CA-I SERPL-SCNC: 1.28 MMOL/L — SIGNIFICANT CHANGE UP (ref 1.12–1.3)
CA-I SERPL-SCNC: 1.28 MMOL/L — SIGNIFICANT CHANGE UP (ref 1.15–1.33)
CA-I SERPL-SCNC: 1.3 MMOL/L — SIGNIFICANT CHANGE UP (ref 1.15–1.33)
CA-I SERPL-SCNC: 1.31 MMOL/L — SIGNIFICANT CHANGE UP (ref 1.15–1.33)
CA-I SERPL-SCNC: 1.37 MMOL/L — HIGH (ref 1.15–1.33)
CA-I SERPL-SCNC: 1.39 MMOL/L — HIGH (ref 1.15–1.33)
CALCIUM SERPL-MCNC: 10 MG/DL — SIGNIFICANT CHANGE UP (ref 8.4–10.5)
CALCIUM SERPL-MCNC: 10.1 MG/DL — SIGNIFICANT CHANGE UP (ref 8.4–10.5)
CALCIUM SERPL-MCNC: 10.2 MG/DL — SIGNIFICANT CHANGE UP (ref 8.4–10.5)
CALCIUM SERPL-MCNC: 10.3 MG/DL — SIGNIFICANT CHANGE UP (ref 8.4–10.5)
CALCIUM SERPL-MCNC: 10.4 MG/DL — SIGNIFICANT CHANGE UP (ref 8.4–10.5)
CALCIUM SERPL-MCNC: 10.5 MG/DL — SIGNIFICANT CHANGE UP (ref 8.4–10.5)
CALCIUM SERPL-MCNC: 10.6 MG/DL — HIGH (ref 8.4–10.5)
CALCIUM SERPL-MCNC: 10.7 MG/DL — HIGH (ref 8.4–10.5)
CALCIUM SERPL-MCNC: 10.8 MG/DL — HIGH (ref 8.4–10.5)
CALCIUM SERPL-MCNC: 10.9 MG/DL — HIGH (ref 8.4–10.5)
CALCIUM SERPL-MCNC: 10.9 MG/DL — HIGH (ref 8.4–10.5)
CALCIUM SERPL-MCNC: 11 MG/DL — HIGH (ref 8.4–10.5)
CALCIUM SERPL-MCNC: 11.1 MG/DL — HIGH (ref 8.4–10.5)
CALCIUM SERPL-MCNC: 8.1 MG/DL — LOW (ref 8.4–10.5)
CALCIUM SERPL-MCNC: 8.4 MG/DL — SIGNIFICANT CHANGE UP (ref 8.4–10.5)
CALCIUM SERPL-MCNC: 8.4 MG/DL — SIGNIFICANT CHANGE UP (ref 8.4–10.5)
CALCIUM SERPL-MCNC: 8.5 MG/DL — SIGNIFICANT CHANGE UP (ref 8.4–10.5)
CALCIUM SERPL-MCNC: 8.6 MG/DL — SIGNIFICANT CHANGE UP (ref 8.4–10.5)
CALCIUM SERPL-MCNC: 8.7 MG/DL — SIGNIFICANT CHANGE UP (ref 8.4–10.5)
CALCIUM SERPL-MCNC: 8.8 MG/DL — SIGNIFICANT CHANGE UP (ref 8.4–10.5)
CALCIUM SERPL-MCNC: 8.9 MG/DL — SIGNIFICANT CHANGE UP (ref 8.4–10.5)
CALCIUM SERPL-MCNC: 9 MG/DL — SIGNIFICANT CHANGE UP (ref 8.4–10.5)
CALCIUM SERPL-MCNC: 9.1 MG/DL — SIGNIFICANT CHANGE UP (ref 8.4–10.5)
CALCIUM SERPL-MCNC: 9.2 MG/DL — SIGNIFICANT CHANGE UP (ref 8.4–10.5)
CALCIUM SERPL-MCNC: 9.3 MG/DL — SIGNIFICANT CHANGE UP (ref 8.4–10.5)
CALCIUM SERPL-MCNC: 9.4 MG/DL — SIGNIFICANT CHANGE UP (ref 8.4–10.5)
CALCIUM SERPL-MCNC: 9.5 MG/DL — SIGNIFICANT CHANGE UP (ref 8.4–10.5)
CALCIUM SERPL-MCNC: 9.6 MG/DL — SIGNIFICANT CHANGE UP (ref 8.4–10.5)
CALCIUM SERPL-MCNC: 9.7 MG/DL — SIGNIFICANT CHANGE UP (ref 8.4–10.5)
CALCIUM SERPL-MCNC: 9.8 MG/DL — SIGNIFICANT CHANGE UP (ref 8.4–10.5)
CALCIUM SERPL-MCNC: 9.9 MG/DL — SIGNIFICANT CHANGE UP (ref 8.4–10.5)
CCP IGG SERPL-ACNC: <8 UNITS — SIGNIFICANT CHANGE UP
CHLORIDE BLDV-SCNC: 102 MMOL/L — SIGNIFICANT CHANGE UP (ref 96–108)
CHLORIDE BLDV-SCNC: 102 MMOL/L — SIGNIFICANT CHANGE UP (ref 96–108)
CHLORIDE BLDV-SCNC: 103 MMOL/L — SIGNIFICANT CHANGE UP (ref 96–108)
CHLORIDE BLDV-SCNC: 104 MMOL/L — SIGNIFICANT CHANGE UP (ref 96–108)
CHLORIDE BLDV-SCNC: 105 MMOL/L — SIGNIFICANT CHANGE UP (ref 96–108)
CHLORIDE BLDV-SCNC: 106 MMOL/L — SIGNIFICANT CHANGE UP (ref 96–108)
CHLORIDE BLDV-SCNC: 117 MMOL/L — HIGH (ref 96–108)
CHLORIDE BLDV-SCNC: 118 MMOL/L — HIGH (ref 96–108)
CHLORIDE BLDV-SCNC: 98 MMOL/L — SIGNIFICANT CHANGE UP (ref 96–108)
CHLORIDE BLDV-SCNC: 98 MMOL/L — SIGNIFICANT CHANGE UP (ref 96–108)
CHLORIDE BLDV-SCNC: 99 MMOL/L — SIGNIFICANT CHANGE UP (ref 96–108)
CHLORIDE SERPL-SCNC: 100 MMOL/L — SIGNIFICANT CHANGE UP (ref 96–108)
CHLORIDE SERPL-SCNC: 101 MMOL/L — SIGNIFICANT CHANGE UP (ref 96–108)
CHLORIDE SERPL-SCNC: 102 MMOL/L — SIGNIFICANT CHANGE UP (ref 96–108)
CHLORIDE SERPL-SCNC: 103 MMOL/L — SIGNIFICANT CHANGE UP (ref 96–108)
CHLORIDE SERPL-SCNC: 104 MMOL/L — SIGNIFICANT CHANGE UP (ref 96–108)
CHLORIDE SERPL-SCNC: 105 MMOL/L — SIGNIFICANT CHANGE UP (ref 96–108)
CHLORIDE SERPL-SCNC: 106 MMOL/L — SIGNIFICANT CHANGE UP (ref 96–108)
CHLORIDE SERPL-SCNC: 108 MMOL/L — SIGNIFICANT CHANGE UP (ref 96–108)
CHLORIDE SERPL-SCNC: 108 MMOL/L — SIGNIFICANT CHANGE UP (ref 96–108)
CHLORIDE SERPL-SCNC: 109 MMOL/L — HIGH (ref 96–108)
CHLORIDE SERPL-SCNC: 110 MMOL/L — HIGH (ref 96–108)
CHLORIDE SERPL-SCNC: 111 MMOL/L — HIGH (ref 96–108)
CHLORIDE SERPL-SCNC: 112 MMOL/L — HIGH (ref 96–108)
CHLORIDE SERPL-SCNC: 115 MMOL/L — HIGH (ref 96–108)
CHLORIDE SERPL-SCNC: 117 MMOL/L — HIGH (ref 96–108)
CHLORIDE SERPL-SCNC: 90 MMOL/L — LOW (ref 96–108)
CHLORIDE SERPL-SCNC: 91 MMOL/L — LOW (ref 96–108)
CHLORIDE SERPL-SCNC: 92 MMOL/L — LOW (ref 96–108)
CHLORIDE SERPL-SCNC: 93 MMOL/L — LOW (ref 96–108)
CHLORIDE SERPL-SCNC: 94 MMOL/L — LOW (ref 96–108)
CHLORIDE SERPL-SCNC: 95 MMOL/L — LOW (ref 96–108)
CHLORIDE SERPL-SCNC: 96 MMOL/L — SIGNIFICANT CHANGE UP (ref 96–108)
CHLORIDE SERPL-SCNC: 97 MMOL/L — SIGNIFICANT CHANGE UP (ref 96–108)
CHLORIDE SERPL-SCNC: 98 MMOL/L — SIGNIFICANT CHANGE UP (ref 96–108)
CHLORIDE SERPL-SCNC: 99 MMOL/L — SIGNIFICANT CHANGE UP (ref 96–108)
CHOLEST SERPL-MCNC: 153 MG/DL — SIGNIFICANT CHANGE UP
CO2 BLDA-SCNC: 29 MMOL/L — HIGH (ref 19–24)
CO2 BLDMV-SCNC: 22 MMOL/L — SIGNIFICANT CHANGE UP (ref 21–29)
CO2 BLDMV-SCNC: 25 MMOL/L — SIGNIFICANT CHANGE UP (ref 21–29)
CO2 BLDMV-SCNC: 25 MMOL/L — SIGNIFICANT CHANGE UP (ref 21–29)
CO2 BLDV-SCNC: 20 MMOL/L — LOW (ref 22–26)
CO2 BLDV-SCNC: 21 MMOL/L — LOW (ref 22–26)
CO2 BLDV-SCNC: 23 MMOL/L — SIGNIFICANT CHANGE UP (ref 22–30)
CO2 BLDV-SCNC: 23 MMOL/L — SIGNIFICANT CHANGE UP (ref 22–30)
CO2 BLDV-SCNC: 24 MMOL/L — SIGNIFICANT CHANGE UP (ref 22–30)
CO2 BLDV-SCNC: 25 MMOL/L — SIGNIFICANT CHANGE UP (ref 22–30)
CO2 BLDV-SCNC: 26 MMOL/L — SIGNIFICANT CHANGE UP (ref 22–26)
CO2 BLDV-SCNC: 26 MMOL/L — SIGNIFICANT CHANGE UP (ref 22–30)
CO2 BLDV-SCNC: 27 MMOL/L — SIGNIFICANT CHANGE UP (ref 22–30)
CO2 BLDV-SCNC: 28 MMOL/L — HIGH (ref 22–26)
CO2 BLDV-SCNC: 28 MMOL/L — HIGH (ref 22–26)
CO2 BLDV-SCNC: 29 MMOL/L — HIGH (ref 22–26)
CO2 BLDV-SCNC: 29 MMOL/L — HIGH (ref 22–26)
CO2 BLDV-SCNC: 31 MMOL/L — HIGH (ref 22–26)
CO2 SERPL-SCNC: 16 MMOL/L — LOW (ref 22–31)
CO2 SERPL-SCNC: 16 MMOL/L — LOW (ref 22–31)
CO2 SERPL-SCNC: 17 MMOL/L — LOW (ref 22–31)
CO2 SERPL-SCNC: 18 MMOL/L — LOW (ref 22–31)
CO2 SERPL-SCNC: 19 MMOL/L — LOW (ref 22–31)
CO2 SERPL-SCNC: 20 MMOL/L — LOW (ref 22–31)
CO2 SERPL-SCNC: 21 MMOL/L — LOW (ref 22–31)
CO2 SERPL-SCNC: 22 MMOL/L — SIGNIFICANT CHANGE UP (ref 22–31)
CO2 SERPL-SCNC: 23 MMOL/L — SIGNIFICANT CHANGE UP (ref 22–31)
CO2 SERPL-SCNC: 24 MMOL/L — SIGNIFICANT CHANGE UP (ref 22–31)
CO2 SERPL-SCNC: 25 MMOL/L — SIGNIFICANT CHANGE UP (ref 22–31)
CO2 SERPL-SCNC: 26 MMOL/L — SIGNIFICANT CHANGE UP (ref 22–31)
CO2 SERPL-SCNC: 27 MMOL/L — SIGNIFICANT CHANGE UP (ref 22–31)
CO2 SERPL-SCNC: 28 MMOL/L — SIGNIFICANT CHANGE UP (ref 22–31)
CO2 SERPL-SCNC: 29 MMOL/L — SIGNIFICANT CHANGE UP (ref 22–31)
CO2 SERPL-SCNC: 29 MMOL/L — SIGNIFICANT CHANGE UP (ref 22–31)
CO2 SERPL-SCNC: 30 MMOL/L — SIGNIFICANT CHANGE UP (ref 22–31)
COLOR SPEC: ABNORMAL
COLOR SPEC: ABNORMAL
COLOR SPEC: COLORLESS — SIGNIFICANT CHANGE UP
COLOR SPEC: SIGNIFICANT CHANGE UP
COLOR SPEC: SIGNIFICANT CHANGE UP
COLOR SPEC: YELLOW — SIGNIFICANT CHANGE UP
COVID-19 NUCLEOCAPSID GAM AB INTERP: POSITIVE
COVID-19 NUCLEOCAPSID TOTAL GAM ANTIBODY RESULT: 88.4 INDEX — HIGH
COVID-19 SPIKE DOMAIN AB INTERP: POSITIVE
COVID-19 SPIKE DOMAIN AB INTERP: POSITIVE
COVID-19 SPIKE DOMAIN ANTIBODY RESULT: >250 U/ML — HIGH
COVID-19 SPIKE DOMAIN ANTIBODY RESULT: >250 U/ML — HIGH
CREAT ?TM UR-MCNC: 67 MG/DL — SIGNIFICANT CHANGE UP
CREAT SERPL-MCNC: 0.35 MG/DL — LOW (ref 0.5–1.3)
CREAT SERPL-MCNC: 0.36 MG/DL — LOW (ref 0.5–1.3)
CREAT SERPL-MCNC: 0.36 MG/DL — LOW (ref 0.5–1.3)
CREAT SERPL-MCNC: 0.37 MG/DL — LOW (ref 0.5–1.3)
CREAT SERPL-MCNC: 0.38 MG/DL — LOW (ref 0.5–1.3)
CREAT SERPL-MCNC: 0.39 MG/DL — LOW (ref 0.5–1.3)
CREAT SERPL-MCNC: 0.4 MG/DL — LOW (ref 0.5–1.3)
CREAT SERPL-MCNC: 0.41 MG/DL — LOW (ref 0.5–1.3)
CREAT SERPL-MCNC: 0.41 MG/DL — LOW (ref 0.5–1.3)
CREAT SERPL-MCNC: 0.42 MG/DL — LOW (ref 0.5–1.3)
CREAT SERPL-MCNC: 0.43 MG/DL — LOW (ref 0.5–1.3)
CREAT SERPL-MCNC: 0.43 MG/DL — LOW (ref 0.5–1.3)
CREAT SERPL-MCNC: 0.44 MG/DL — LOW (ref 0.5–1.3)
CREAT SERPL-MCNC: 0.45 MG/DL — LOW (ref 0.5–1.3)
CREAT SERPL-MCNC: 0.46 MG/DL — LOW (ref 0.5–1.3)
CREAT SERPL-MCNC: 0.47 MG/DL — LOW (ref 0.5–1.3)
CREAT SERPL-MCNC: 0.48 MG/DL — LOW (ref 0.5–1.3)
CREAT SERPL-MCNC: 0.49 MG/DL — LOW (ref 0.5–1.3)
CREAT SERPL-MCNC: 0.5 MG/DL — SIGNIFICANT CHANGE UP (ref 0.5–1.3)
CREAT SERPL-MCNC: 0.51 MG/DL — SIGNIFICANT CHANGE UP (ref 0.5–1.3)
CREAT SERPL-MCNC: 0.52 MG/DL — SIGNIFICANT CHANGE UP (ref 0.5–1.3)
CREAT SERPL-MCNC: 0.53 MG/DL — SIGNIFICANT CHANGE UP (ref 0.5–1.3)
CREAT SERPL-MCNC: 0.54 MG/DL — SIGNIFICANT CHANGE UP (ref 0.5–1.3)
CREAT SERPL-MCNC: 0.55 MG/DL — SIGNIFICANT CHANGE UP (ref 0.5–1.3)
CREAT SERPL-MCNC: 0.56 MG/DL — SIGNIFICANT CHANGE UP (ref 0.5–1.3)
CREAT SERPL-MCNC: 0.57 MG/DL — SIGNIFICANT CHANGE UP (ref 0.5–1.3)
CREAT SERPL-MCNC: 0.58 MG/DL — SIGNIFICANT CHANGE UP (ref 0.5–1.3)
CREAT SERPL-MCNC: 0.59 MG/DL — SIGNIFICANT CHANGE UP (ref 0.5–1.3)
CREAT SERPL-MCNC: 0.6 MG/DL — SIGNIFICANT CHANGE UP (ref 0.5–1.3)
CREAT SERPL-MCNC: 0.61 MG/DL — SIGNIFICANT CHANGE UP (ref 0.5–1.3)
CREAT SERPL-MCNC: 0.62 MG/DL — SIGNIFICANT CHANGE UP (ref 0.5–1.3)
CREAT SERPL-MCNC: 0.63 MG/DL — SIGNIFICANT CHANGE UP (ref 0.5–1.3)
CREAT SERPL-MCNC: 0.64 MG/DL — SIGNIFICANT CHANGE UP (ref 0.5–1.3)
CREAT SERPL-MCNC: 0.65 MG/DL — SIGNIFICANT CHANGE UP (ref 0.5–1.3)
CREAT SERPL-MCNC: 0.65 MG/DL — SIGNIFICANT CHANGE UP (ref 0.5–1.3)
CREAT SERPL-MCNC: 0.66 MG/DL — SIGNIFICANT CHANGE UP (ref 0.5–1.3)
CREAT SERPL-MCNC: 0.67 MG/DL — SIGNIFICANT CHANGE UP (ref 0.5–1.3)
CREAT SERPL-MCNC: 0.68 MG/DL — SIGNIFICANT CHANGE UP (ref 0.5–1.3)
CREAT SERPL-MCNC: 0.68 MG/DL — SIGNIFICANT CHANGE UP (ref 0.5–1.3)
CREAT SERPL-MCNC: 0.69 MG/DL — SIGNIFICANT CHANGE UP (ref 0.5–1.3)
CREAT SERPL-MCNC: 0.69 MG/DL — SIGNIFICANT CHANGE UP (ref 0.5–1.3)
CREAT SERPL-MCNC: 0.7 MG/DL — SIGNIFICANT CHANGE UP (ref 0.5–1.3)
CREAT SERPL-MCNC: 0.72 MG/DL — SIGNIFICANT CHANGE UP (ref 0.5–1.3)
CREAT SERPL-MCNC: 0.72 MG/DL — SIGNIFICANT CHANGE UP (ref 0.5–1.3)
CREAT SERPL-MCNC: 0.73 MG/DL — SIGNIFICANT CHANGE UP (ref 0.5–1.3)
CREAT SERPL-MCNC: 0.74 MG/DL — SIGNIFICANT CHANGE UP (ref 0.5–1.3)
CREAT SERPL-MCNC: 0.75 MG/DL — SIGNIFICANT CHANGE UP (ref 0.5–1.3)
CREAT SERPL-MCNC: 0.76 MG/DL — SIGNIFICANT CHANGE UP (ref 0.5–1.3)
CREAT SERPL-MCNC: 0.77 MG/DL — SIGNIFICANT CHANGE UP (ref 0.5–1.3)
CREAT SERPL-MCNC: 0.77 MG/DL — SIGNIFICANT CHANGE UP (ref 0.5–1.3)
CREAT SERPL-MCNC: 0.79 MG/DL — SIGNIFICANT CHANGE UP (ref 0.5–1.3)
CREAT SERPL-MCNC: 0.8 MG/DL — SIGNIFICANT CHANGE UP (ref 0.5–1.3)
CREAT SERPL-MCNC: 0.81 MG/DL — SIGNIFICANT CHANGE UP (ref 0.5–1.3)
CREAT SERPL-MCNC: 0.83 MG/DL — SIGNIFICANT CHANGE UP (ref 0.5–1.3)
CREAT SERPL-MCNC: 0.84 MG/DL — SIGNIFICANT CHANGE UP (ref 0.5–1.3)
CREAT SERPL-MCNC: 0.84 MG/DL — SIGNIFICANT CHANGE UP (ref 0.5–1.3)
CREAT SERPL-MCNC: 0.85 MG/DL — SIGNIFICANT CHANGE UP (ref 0.5–1.3)
CREAT SERPL-MCNC: 0.87 MG/DL — SIGNIFICANT CHANGE UP (ref 0.5–1.3)
CREAT SERPL-MCNC: 0.88 MG/DL — SIGNIFICANT CHANGE UP (ref 0.5–1.3)
CREAT SERPL-MCNC: 0.88 MG/DL — SIGNIFICANT CHANGE UP (ref 0.5–1.3)
CREAT SERPL-MCNC: 0.89 MG/DL — SIGNIFICANT CHANGE UP (ref 0.5–1.3)
CREAT SERPL-MCNC: 0.89 MG/DL — SIGNIFICANT CHANGE UP (ref 0.5–1.3)
CREAT SERPL-MCNC: 0.92 MG/DL — SIGNIFICANT CHANGE UP (ref 0.5–1.3)
CREAT SERPL-MCNC: 0.94 MG/DL — SIGNIFICANT CHANGE UP (ref 0.5–1.3)
CREAT SERPL-MCNC: 0.99 MG/DL — SIGNIFICANT CHANGE UP (ref 0.5–1.3)
CREAT SERPL-MCNC: 1.03 MG/DL — SIGNIFICANT CHANGE UP (ref 0.5–1.3)
CREAT SERPL-MCNC: 1.06 MG/DL — SIGNIFICANT CHANGE UP (ref 0.5–1.3)
CREAT SERPL-MCNC: 1.07 MG/DL — SIGNIFICANT CHANGE UP (ref 0.5–1.3)
CREAT SERPL-MCNC: 1.23 MG/DL — SIGNIFICANT CHANGE UP (ref 0.5–1.3)
CREAT SERPL-MCNC: 1.28 MG/DL — SIGNIFICANT CHANGE UP (ref 0.5–1.3)
CREAT SERPL-MCNC: 1.66 MG/DL — HIGH (ref 0.5–1.3)
CREAT SERPL-MCNC: 2 MG/DL — HIGH (ref 0.5–1.3)
CRP SERPL-MCNC: 182 MG/L — HIGH (ref 0–4)
CRP SERPL-MCNC: 221 MG/L — HIGH (ref 0–4)
CRP SERPL-MCNC: 266 MG/L — HIGH (ref 0–4)
CRP SERPL-MCNC: 57 MG/L — HIGH (ref 0–4)
CRP SERPL-MCNC: 73 MG/L — HIGH (ref 0–4)
CULTURE RESULTS: SIGNIFICANT CHANGE UP
D DIMER BLD IA.RAPID-MCNC: 1271 NG/ML DDU — HIGH
D DIMER BLD IA.RAPID-MCNC: 3655 NG/ML DDU — HIGH
D DIMER BLD IA.RAPID-MCNC: 5003 NG/ML DDU — HIGH
DAT POLY-SP REAG RBC QL: NEGATIVE — SIGNIFICANT CHANGE UP
DIFF PNL FLD: ABNORMAL
DIFF PNL FLD: NEGATIVE — SIGNIFICANT CHANGE UP
EOSINOPHIL # BLD AUTO: 0 K/UL — SIGNIFICANT CHANGE UP (ref 0–0.5)
EOSINOPHIL # BLD AUTO: 0 K/UL — SIGNIFICANT CHANGE UP (ref 0–0.5)
EOSINOPHIL # BLD AUTO: 0.06 K/UL — SIGNIFICANT CHANGE UP (ref 0–0.5)
EOSINOPHIL # BLD AUTO: 0.07 K/UL — SIGNIFICANT CHANGE UP (ref 0–0.5)
EOSINOPHIL # BLD AUTO: 0.16 K/UL — SIGNIFICANT CHANGE UP (ref 0–0.5)
EOSINOPHIL # BLD AUTO: 0.22 K/UL — SIGNIFICANT CHANGE UP (ref 0–0.5)
EOSINOPHIL # BLD AUTO: 0.25 K/UL — SIGNIFICANT CHANGE UP (ref 0–0.5)
EOSINOPHIL # BLD AUTO: 0.26 K/UL — SIGNIFICANT CHANGE UP (ref 0–0.5)
EOSINOPHIL # BLD AUTO: 0.36 K/UL — SIGNIFICANT CHANGE UP (ref 0–0.5)
EOSINOPHIL NFR BLD AUTO: 0 % — SIGNIFICANT CHANGE UP (ref 0–6)
EOSINOPHIL NFR BLD AUTO: 0 % — SIGNIFICANT CHANGE UP (ref 0–6)
EOSINOPHIL NFR BLD AUTO: 0.5 % — SIGNIFICANT CHANGE UP (ref 0–6)
EOSINOPHIL NFR BLD AUTO: 0.8 % — SIGNIFICANT CHANGE UP (ref 0–6)
EOSINOPHIL NFR BLD AUTO: 1.7 % — SIGNIFICANT CHANGE UP (ref 0–6)
EOSINOPHIL NFR BLD AUTO: 1.7 % — SIGNIFICANT CHANGE UP (ref 0–6)
EOSINOPHIL NFR BLD AUTO: 2.1 % — SIGNIFICANT CHANGE UP (ref 0–6)
EOSINOPHIL NFR BLD AUTO: 3.4 % — SIGNIFICANT CHANGE UP (ref 0–6)
EOSINOPHIL NFR BLD AUTO: 6.1 % — HIGH (ref 0–6)
EPI CELLS # UR: 0 /HPF — SIGNIFICANT CHANGE UP
EPI CELLS # UR: 1 /HPF — SIGNIFICANT CHANGE UP
EPI CELLS # UR: 2 /HPF — SIGNIFICANT CHANGE UP
EPI CELLS # UR: 2 — SIGNIFICANT CHANGE UP
EPI CELLS # UR: 7 /HPF — HIGH
ERYTHROCYTE [SEDIMENTATION RATE] IN BLOOD: 101 MM/HR — HIGH (ref 0–20)
ESTIMATED AVERAGE GLUCOSE: 108 MG/DL — SIGNIFICANT CHANGE UP (ref 68–114)
ESTIMATED AVERAGE GLUCOSE: 114 MG/DL — SIGNIFICANT CHANGE UP (ref 68–114)
FERRITIN SERPL-MCNC: 498 NG/ML — HIGH (ref 30–400)
FIBRINOGEN PPP-MCNC: 523 MG/DL — HIGH (ref 290–520)
FIBRINOGEN PPP-MCNC: 626 MG/DL — HIGH (ref 290–520)
FIBRINOGEN PPP-MCNC: 658 MG/DL — HIGH (ref 290–520)
FIBRINOGEN PPP-MCNC: 668 MG/DL — HIGH (ref 290–520)
FUNGITELL: 35 PG/ML — SIGNIFICANT CHANGE UP
FUNGITELL: <31 PG/ML — SIGNIFICANT CHANGE UP
GAMMA GLOBULIN: 2.2 G/DL — HIGH (ref 0.6–1.6)
GAMMA INTERFERON BACKGROUND BLD IA-ACNC: 0.02 IU/ML — SIGNIFICANT CHANGE UP
GAS PNL BLDA: SIGNIFICANT CHANGE UP
GAS PNL BLDMV: SIGNIFICANT CHANGE UP
GAS PNL BLDV: 129 MMOL/L — LOW (ref 136–145)
GAS PNL BLDV: 130 MMOL/L — LOW (ref 135–145)
GAS PNL BLDV: 131 MMOL/L — LOW (ref 135–145)
GAS PNL BLDV: 131 MMOL/L — LOW (ref 135–145)
GAS PNL BLDV: 132 MMOL/L — LOW (ref 135–145)
GAS PNL BLDV: 132 MMOL/L — LOW (ref 135–145)
GAS PNL BLDV: 132 MMOL/L — LOW (ref 136–145)
GAS PNL BLDV: 134 MMOL/L — LOW (ref 135–145)
GAS PNL BLDV: 134 MMOL/L — LOW (ref 135–145)
GAS PNL BLDV: 135 MMOL/L — SIGNIFICANT CHANGE UP (ref 135–145)
GAS PNL BLDV: 137 MMOL/L — SIGNIFICANT CHANGE UP (ref 136–145)
GAS PNL BLDV: 144 MMOL/L — SIGNIFICANT CHANGE UP (ref 136–145)
GAS PNL BLDV: 147 MMOL/L — HIGH (ref 136–145)
GAS PNL BLDV: SIGNIFICANT CHANGE UP
GIANT PLATELETS BLD QL SMEAR: PRESENT — SIGNIFICANT CHANGE UP
GLUCOSE BLDC GLUCOMTR-MCNC: 100 MG/DL — HIGH (ref 70–99)
GLUCOSE BLDC GLUCOMTR-MCNC: 101 MG/DL — HIGH (ref 70–99)
GLUCOSE BLDC GLUCOMTR-MCNC: 102 MG/DL — HIGH (ref 70–99)
GLUCOSE BLDC GLUCOMTR-MCNC: 103 MG/DL — HIGH (ref 70–99)
GLUCOSE BLDC GLUCOMTR-MCNC: 104 MG/DL — HIGH (ref 70–99)
GLUCOSE BLDC GLUCOMTR-MCNC: 105 MG/DL — HIGH (ref 70–99)
GLUCOSE BLDC GLUCOMTR-MCNC: 106 MG/DL — HIGH (ref 70–99)
GLUCOSE BLDC GLUCOMTR-MCNC: 107 MG/DL — HIGH (ref 70–99)
GLUCOSE BLDC GLUCOMTR-MCNC: 108 MG/DL — HIGH (ref 70–99)
GLUCOSE BLDC GLUCOMTR-MCNC: 109 MG/DL — HIGH (ref 70–99)
GLUCOSE BLDC GLUCOMTR-MCNC: 110 MG/DL — HIGH (ref 70–99)
GLUCOSE BLDC GLUCOMTR-MCNC: 111 MG/DL — HIGH (ref 70–99)
GLUCOSE BLDC GLUCOMTR-MCNC: 112 MG/DL — HIGH (ref 70–99)
GLUCOSE BLDC GLUCOMTR-MCNC: 113 MG/DL — HIGH (ref 70–99)
GLUCOSE BLDC GLUCOMTR-MCNC: 114 MG/DL — HIGH (ref 70–99)
GLUCOSE BLDC GLUCOMTR-MCNC: 115 MG/DL — HIGH (ref 70–99)
GLUCOSE BLDC GLUCOMTR-MCNC: 116 MG/DL — HIGH (ref 70–99)
GLUCOSE BLDC GLUCOMTR-MCNC: 117 MG/DL — HIGH (ref 70–99)
GLUCOSE BLDC GLUCOMTR-MCNC: 118 MG/DL — HIGH (ref 70–99)
GLUCOSE BLDC GLUCOMTR-MCNC: 119 MG/DL — HIGH (ref 70–99)
GLUCOSE BLDC GLUCOMTR-MCNC: 120 MG/DL — HIGH (ref 70–99)
GLUCOSE BLDC GLUCOMTR-MCNC: 121 MG/DL — HIGH (ref 70–99)
GLUCOSE BLDC GLUCOMTR-MCNC: 122 MG/DL — HIGH (ref 70–99)
GLUCOSE BLDC GLUCOMTR-MCNC: 123 MG/DL — HIGH (ref 70–99)
GLUCOSE BLDC GLUCOMTR-MCNC: 124 MG/DL — HIGH (ref 70–99)
GLUCOSE BLDC GLUCOMTR-MCNC: 125 MG/DL — HIGH (ref 70–99)
GLUCOSE BLDC GLUCOMTR-MCNC: 126 MG/DL — HIGH (ref 70–99)
GLUCOSE BLDC GLUCOMTR-MCNC: 127 MG/DL — HIGH (ref 70–99)
GLUCOSE BLDC GLUCOMTR-MCNC: 128 MG/DL — HIGH (ref 70–99)
GLUCOSE BLDC GLUCOMTR-MCNC: 129 MG/DL — HIGH (ref 70–99)
GLUCOSE BLDC GLUCOMTR-MCNC: 130 MG/DL — HIGH (ref 70–99)
GLUCOSE BLDC GLUCOMTR-MCNC: 131 MG/DL — HIGH (ref 70–99)
GLUCOSE BLDC GLUCOMTR-MCNC: 132 MG/DL — HIGH (ref 70–99)
GLUCOSE BLDC GLUCOMTR-MCNC: 133 MG/DL — HIGH (ref 70–99)
GLUCOSE BLDC GLUCOMTR-MCNC: 134 MG/DL — HIGH (ref 70–99)
GLUCOSE BLDC GLUCOMTR-MCNC: 135 MG/DL — HIGH (ref 70–99)
GLUCOSE BLDC GLUCOMTR-MCNC: 136 MG/DL — HIGH (ref 70–99)
GLUCOSE BLDC GLUCOMTR-MCNC: 137 MG/DL — HIGH (ref 70–99)
GLUCOSE BLDC GLUCOMTR-MCNC: 138 MG/DL — HIGH (ref 70–99)
GLUCOSE BLDC GLUCOMTR-MCNC: 139 MG/DL — HIGH (ref 70–99)
GLUCOSE BLDC GLUCOMTR-MCNC: 140 MG/DL — HIGH (ref 70–99)
GLUCOSE BLDC GLUCOMTR-MCNC: 141 MG/DL — HIGH (ref 70–99)
GLUCOSE BLDC GLUCOMTR-MCNC: 142 MG/DL — HIGH (ref 70–99)
GLUCOSE BLDC GLUCOMTR-MCNC: 143 MG/DL — HIGH (ref 70–99)
GLUCOSE BLDC GLUCOMTR-MCNC: 144 MG/DL — HIGH (ref 70–99)
GLUCOSE BLDC GLUCOMTR-MCNC: 145 MG/DL — HIGH (ref 70–99)
GLUCOSE BLDC GLUCOMTR-MCNC: 146 MG/DL — HIGH (ref 70–99)
GLUCOSE BLDC GLUCOMTR-MCNC: 147 MG/DL — HIGH (ref 70–99)
GLUCOSE BLDC GLUCOMTR-MCNC: 148 MG/DL — HIGH (ref 70–99)
GLUCOSE BLDC GLUCOMTR-MCNC: 149 MG/DL — HIGH (ref 70–99)
GLUCOSE BLDC GLUCOMTR-MCNC: 150 MG/DL — HIGH (ref 70–99)
GLUCOSE BLDC GLUCOMTR-MCNC: 151 MG/DL — HIGH (ref 70–99)
GLUCOSE BLDC GLUCOMTR-MCNC: 152 MG/DL — HIGH (ref 70–99)
GLUCOSE BLDC GLUCOMTR-MCNC: 153 MG/DL — HIGH (ref 70–99)
GLUCOSE BLDC GLUCOMTR-MCNC: 153 MG/DL — HIGH (ref 70–99)
GLUCOSE BLDC GLUCOMTR-MCNC: 154 MG/DL — HIGH (ref 70–99)
GLUCOSE BLDC GLUCOMTR-MCNC: 155 MG/DL — HIGH (ref 70–99)
GLUCOSE BLDC GLUCOMTR-MCNC: 156 MG/DL — HIGH (ref 70–99)
GLUCOSE BLDC GLUCOMTR-MCNC: 157 MG/DL — HIGH (ref 70–99)
GLUCOSE BLDC GLUCOMTR-MCNC: 158 MG/DL — HIGH (ref 70–99)
GLUCOSE BLDC GLUCOMTR-MCNC: 159 MG/DL — HIGH (ref 70–99)
GLUCOSE BLDC GLUCOMTR-MCNC: 160 MG/DL — HIGH (ref 70–99)
GLUCOSE BLDC GLUCOMTR-MCNC: 161 MG/DL — HIGH (ref 70–99)
GLUCOSE BLDC GLUCOMTR-MCNC: 162 MG/DL — HIGH (ref 70–99)
GLUCOSE BLDC GLUCOMTR-MCNC: 163 MG/DL — HIGH (ref 70–99)
GLUCOSE BLDC GLUCOMTR-MCNC: 164 MG/DL — HIGH (ref 70–99)
GLUCOSE BLDC GLUCOMTR-MCNC: 164 MG/DL — HIGH (ref 70–99)
GLUCOSE BLDC GLUCOMTR-MCNC: 165 MG/DL — HIGH (ref 70–99)
GLUCOSE BLDC GLUCOMTR-MCNC: 166 MG/DL — HIGH (ref 70–99)
GLUCOSE BLDC GLUCOMTR-MCNC: 167 MG/DL — HIGH (ref 70–99)
GLUCOSE BLDC GLUCOMTR-MCNC: 168 MG/DL — HIGH (ref 70–99)
GLUCOSE BLDC GLUCOMTR-MCNC: 168 MG/DL — HIGH (ref 70–99)
GLUCOSE BLDC GLUCOMTR-MCNC: 169 MG/DL — HIGH (ref 70–99)
GLUCOSE BLDC GLUCOMTR-MCNC: 169 MG/DL — HIGH (ref 70–99)
GLUCOSE BLDC GLUCOMTR-MCNC: 170 MG/DL — HIGH (ref 70–99)
GLUCOSE BLDC GLUCOMTR-MCNC: 171 MG/DL — HIGH (ref 70–99)
GLUCOSE BLDC GLUCOMTR-MCNC: 172 MG/DL — HIGH (ref 70–99)
GLUCOSE BLDC GLUCOMTR-MCNC: 173 MG/DL — HIGH (ref 70–99)
GLUCOSE BLDC GLUCOMTR-MCNC: 174 MG/DL — HIGH (ref 70–99)
GLUCOSE BLDC GLUCOMTR-MCNC: 175 MG/DL — HIGH (ref 70–99)
GLUCOSE BLDC GLUCOMTR-MCNC: 176 MG/DL — HIGH (ref 70–99)
GLUCOSE BLDC GLUCOMTR-MCNC: 177 MG/DL — HIGH (ref 70–99)
GLUCOSE BLDC GLUCOMTR-MCNC: 178 MG/DL — HIGH (ref 70–99)
GLUCOSE BLDC GLUCOMTR-MCNC: 179 MG/DL — HIGH (ref 70–99)
GLUCOSE BLDC GLUCOMTR-MCNC: 180 MG/DL — HIGH (ref 70–99)
GLUCOSE BLDC GLUCOMTR-MCNC: 181 MG/DL — HIGH (ref 70–99)
GLUCOSE BLDC GLUCOMTR-MCNC: 182 MG/DL — HIGH (ref 70–99)
GLUCOSE BLDC GLUCOMTR-MCNC: 182 MG/DL — HIGH (ref 70–99)
GLUCOSE BLDC GLUCOMTR-MCNC: 183 MG/DL — HIGH (ref 70–99)
GLUCOSE BLDC GLUCOMTR-MCNC: 183 MG/DL — HIGH (ref 70–99)
GLUCOSE BLDC GLUCOMTR-MCNC: 184 MG/DL — HIGH (ref 70–99)
GLUCOSE BLDC GLUCOMTR-MCNC: 185 MG/DL — HIGH (ref 70–99)
GLUCOSE BLDC GLUCOMTR-MCNC: 186 MG/DL — HIGH (ref 70–99)
GLUCOSE BLDC GLUCOMTR-MCNC: 186 MG/DL — HIGH (ref 70–99)
GLUCOSE BLDC GLUCOMTR-MCNC: 187 MG/DL — HIGH (ref 70–99)
GLUCOSE BLDC GLUCOMTR-MCNC: 188 MG/DL — HIGH (ref 70–99)
GLUCOSE BLDC GLUCOMTR-MCNC: 190 MG/DL — HIGH (ref 70–99)
GLUCOSE BLDC GLUCOMTR-MCNC: 191 MG/DL — HIGH (ref 70–99)
GLUCOSE BLDC GLUCOMTR-MCNC: 191 MG/DL — HIGH (ref 70–99)
GLUCOSE BLDC GLUCOMTR-MCNC: 192 MG/DL — HIGH (ref 70–99)
GLUCOSE BLDC GLUCOMTR-MCNC: 193 MG/DL — HIGH (ref 70–99)
GLUCOSE BLDC GLUCOMTR-MCNC: 194 MG/DL — HIGH (ref 70–99)
GLUCOSE BLDC GLUCOMTR-MCNC: 194 MG/DL — HIGH (ref 70–99)
GLUCOSE BLDC GLUCOMTR-MCNC: 196 MG/DL — HIGH (ref 70–99)
GLUCOSE BLDC GLUCOMTR-MCNC: 196 MG/DL — HIGH (ref 70–99)
GLUCOSE BLDC GLUCOMTR-MCNC: 197 MG/DL — HIGH (ref 70–99)
GLUCOSE BLDC GLUCOMTR-MCNC: 198 MG/DL — HIGH (ref 70–99)
GLUCOSE BLDC GLUCOMTR-MCNC: 199 MG/DL — HIGH (ref 70–99)
GLUCOSE BLDC GLUCOMTR-MCNC: 199 MG/DL — HIGH (ref 70–99)
GLUCOSE BLDC GLUCOMTR-MCNC: 201 MG/DL — HIGH (ref 70–99)
GLUCOSE BLDC GLUCOMTR-MCNC: 201 MG/DL — HIGH (ref 70–99)
GLUCOSE BLDC GLUCOMTR-MCNC: 202 MG/DL — HIGH (ref 70–99)
GLUCOSE BLDC GLUCOMTR-MCNC: 203 MG/DL — HIGH (ref 70–99)
GLUCOSE BLDC GLUCOMTR-MCNC: 203 MG/DL — HIGH (ref 70–99)
GLUCOSE BLDC GLUCOMTR-MCNC: 205 MG/DL — HIGH (ref 70–99)
GLUCOSE BLDC GLUCOMTR-MCNC: 208 MG/DL — HIGH (ref 70–99)
GLUCOSE BLDC GLUCOMTR-MCNC: 210 MG/DL — HIGH (ref 70–99)
GLUCOSE BLDC GLUCOMTR-MCNC: 211 MG/DL — HIGH (ref 70–99)
GLUCOSE BLDC GLUCOMTR-MCNC: 219 MG/DL — HIGH (ref 70–99)
GLUCOSE BLDC GLUCOMTR-MCNC: 222 MG/DL — HIGH (ref 70–99)
GLUCOSE BLDC GLUCOMTR-MCNC: 227 MG/DL — HIGH (ref 70–99)
GLUCOSE BLDC GLUCOMTR-MCNC: 228 MG/DL — HIGH (ref 70–99)
GLUCOSE BLDC GLUCOMTR-MCNC: 229 MG/DL — HIGH (ref 70–99)
GLUCOSE BLDC GLUCOMTR-MCNC: 231 MG/DL — HIGH (ref 70–99)
GLUCOSE BLDC GLUCOMTR-MCNC: 232 MG/DL — HIGH (ref 70–99)
GLUCOSE BLDC GLUCOMTR-MCNC: 233 MG/DL — HIGH (ref 70–99)
GLUCOSE BLDC GLUCOMTR-MCNC: 234 MG/DL — HIGH (ref 70–99)
GLUCOSE BLDC GLUCOMTR-MCNC: 235 MG/DL — HIGH (ref 70–99)
GLUCOSE BLDC GLUCOMTR-MCNC: 238 MG/DL — HIGH (ref 70–99)
GLUCOSE BLDC GLUCOMTR-MCNC: 243 MG/DL — HIGH (ref 70–99)
GLUCOSE BLDC GLUCOMTR-MCNC: 247 MG/DL — HIGH (ref 70–99)
GLUCOSE BLDC GLUCOMTR-MCNC: 249 MG/DL — HIGH (ref 70–99)
GLUCOSE BLDC GLUCOMTR-MCNC: 251 MG/DL — HIGH (ref 70–99)
GLUCOSE BLDC GLUCOMTR-MCNC: 256 MG/DL — HIGH (ref 70–99)
GLUCOSE BLDC GLUCOMTR-MCNC: 256 MG/DL — HIGH (ref 70–99)
GLUCOSE BLDC GLUCOMTR-MCNC: 264 MG/DL — HIGH (ref 70–99)
GLUCOSE BLDC GLUCOMTR-MCNC: 265 MG/DL — HIGH (ref 70–99)
GLUCOSE BLDC GLUCOMTR-MCNC: 269 MG/DL — HIGH (ref 70–99)
GLUCOSE BLDC GLUCOMTR-MCNC: 27 MG/DL — CRITICAL LOW (ref 70–99)
GLUCOSE BLDC GLUCOMTR-MCNC: 280 MG/DL — HIGH (ref 70–99)
GLUCOSE BLDC GLUCOMTR-MCNC: 283 MG/DL — HIGH (ref 70–99)
GLUCOSE BLDC GLUCOMTR-MCNC: 284 MG/DL — HIGH (ref 70–99)
GLUCOSE BLDC GLUCOMTR-MCNC: 288 MG/DL — HIGH (ref 70–99)
GLUCOSE BLDC GLUCOMTR-MCNC: 34 MG/DL — CRITICAL LOW (ref 70–99)
GLUCOSE BLDC GLUCOMTR-MCNC: 461 MG/DL — CRITICAL HIGH (ref 70–99)
GLUCOSE BLDC GLUCOMTR-MCNC: 56 MG/DL — LOW (ref 70–99)
GLUCOSE BLDC GLUCOMTR-MCNC: 58 MG/DL — LOW (ref 70–99)
GLUCOSE BLDC GLUCOMTR-MCNC: 62 MG/DL — LOW (ref 70–99)
GLUCOSE BLDC GLUCOMTR-MCNC: 62 MG/DL — LOW (ref 70–99)
GLUCOSE BLDC GLUCOMTR-MCNC: 65 MG/DL — LOW (ref 70–99)
GLUCOSE BLDC GLUCOMTR-MCNC: 66 MG/DL — LOW (ref 70–99)
GLUCOSE BLDC GLUCOMTR-MCNC: 68 MG/DL — LOW (ref 70–99)
GLUCOSE BLDC GLUCOMTR-MCNC: 68 MG/DL — LOW (ref 70–99)
GLUCOSE BLDC GLUCOMTR-MCNC: 69 MG/DL — LOW (ref 70–99)
GLUCOSE BLDC GLUCOMTR-MCNC: 69 MG/DL — LOW (ref 70–99)
GLUCOSE BLDC GLUCOMTR-MCNC: 70 MG/DL — SIGNIFICANT CHANGE UP (ref 70–99)
GLUCOSE BLDC GLUCOMTR-MCNC: 70 MG/DL — SIGNIFICANT CHANGE UP (ref 70–99)
GLUCOSE BLDC GLUCOMTR-MCNC: 71 MG/DL — SIGNIFICANT CHANGE UP (ref 70–99)
GLUCOSE BLDC GLUCOMTR-MCNC: 74 MG/DL — SIGNIFICANT CHANGE UP (ref 70–99)
GLUCOSE BLDC GLUCOMTR-MCNC: 76 MG/DL — SIGNIFICANT CHANGE UP (ref 70–99)
GLUCOSE BLDC GLUCOMTR-MCNC: 77 MG/DL — SIGNIFICANT CHANGE UP (ref 70–99)
GLUCOSE BLDC GLUCOMTR-MCNC: 77 MG/DL — SIGNIFICANT CHANGE UP (ref 70–99)
GLUCOSE BLDC GLUCOMTR-MCNC: 78 MG/DL — SIGNIFICANT CHANGE UP (ref 70–99)
GLUCOSE BLDC GLUCOMTR-MCNC: 79 MG/DL — SIGNIFICANT CHANGE UP (ref 70–99)
GLUCOSE BLDC GLUCOMTR-MCNC: 80 MG/DL — SIGNIFICANT CHANGE UP (ref 70–99)
GLUCOSE BLDC GLUCOMTR-MCNC: 80 MG/DL — SIGNIFICANT CHANGE UP (ref 70–99)
GLUCOSE BLDC GLUCOMTR-MCNC: 81 MG/DL — SIGNIFICANT CHANGE UP (ref 70–99)
GLUCOSE BLDC GLUCOMTR-MCNC: 82 MG/DL — SIGNIFICANT CHANGE UP (ref 70–99)
GLUCOSE BLDC GLUCOMTR-MCNC: 83 MG/DL — SIGNIFICANT CHANGE UP (ref 70–99)
GLUCOSE BLDC GLUCOMTR-MCNC: 84 MG/DL — SIGNIFICANT CHANGE UP (ref 70–99)
GLUCOSE BLDC GLUCOMTR-MCNC: 85 MG/DL — SIGNIFICANT CHANGE UP (ref 70–99)
GLUCOSE BLDC GLUCOMTR-MCNC: 86 MG/DL — SIGNIFICANT CHANGE UP (ref 70–99)
GLUCOSE BLDC GLUCOMTR-MCNC: 87 MG/DL — SIGNIFICANT CHANGE UP (ref 70–99)
GLUCOSE BLDC GLUCOMTR-MCNC: 88 MG/DL — SIGNIFICANT CHANGE UP (ref 70–99)
GLUCOSE BLDC GLUCOMTR-MCNC: 89 MG/DL — SIGNIFICANT CHANGE UP (ref 70–99)
GLUCOSE BLDC GLUCOMTR-MCNC: 89 MG/DL — SIGNIFICANT CHANGE UP (ref 70–99)
GLUCOSE BLDC GLUCOMTR-MCNC: 90 MG/DL — SIGNIFICANT CHANGE UP (ref 70–99)
GLUCOSE BLDC GLUCOMTR-MCNC: 90 MG/DL — SIGNIFICANT CHANGE UP (ref 70–99)
GLUCOSE BLDC GLUCOMTR-MCNC: 91 MG/DL — SIGNIFICANT CHANGE UP (ref 70–99)
GLUCOSE BLDC GLUCOMTR-MCNC: 92 MG/DL — SIGNIFICANT CHANGE UP (ref 70–99)
GLUCOSE BLDC GLUCOMTR-MCNC: 93 MG/DL — SIGNIFICANT CHANGE UP (ref 70–99)
GLUCOSE BLDC GLUCOMTR-MCNC: 94 MG/DL — SIGNIFICANT CHANGE UP (ref 70–99)
GLUCOSE BLDC GLUCOMTR-MCNC: 95 MG/DL — SIGNIFICANT CHANGE UP (ref 70–99)
GLUCOSE BLDC GLUCOMTR-MCNC: 95 MG/DL — SIGNIFICANT CHANGE UP (ref 70–99)
GLUCOSE BLDC GLUCOMTR-MCNC: 96 MG/DL — SIGNIFICANT CHANGE UP (ref 70–99)
GLUCOSE BLDC GLUCOMTR-MCNC: 97 MG/DL — SIGNIFICANT CHANGE UP (ref 70–99)
GLUCOSE BLDC GLUCOMTR-MCNC: 98 MG/DL — SIGNIFICANT CHANGE UP (ref 70–99)
GLUCOSE BLDC GLUCOMTR-MCNC: 99 MG/DL — SIGNIFICANT CHANGE UP (ref 70–99)
GLUCOSE BLDV-MCNC: 107 MG/DL — HIGH (ref 70–99)
GLUCOSE BLDV-MCNC: 107 MG/DL — HIGH (ref 70–99)
GLUCOSE BLDV-MCNC: 110 MG/DL — HIGH (ref 70–99)
GLUCOSE BLDV-MCNC: 114 MG/DL — HIGH (ref 70–99)
GLUCOSE BLDV-MCNC: 116 MG/DL — HIGH (ref 70–99)
GLUCOSE BLDV-MCNC: 128 MG/DL — HIGH (ref 70–99)
GLUCOSE BLDV-MCNC: 157 MG/DL — HIGH (ref 70–99)
GLUCOSE BLDV-MCNC: 180 MG/DL — HIGH (ref 70–99)
GLUCOSE BLDV-MCNC: 190 MG/DL — HIGH (ref 70–99)
GLUCOSE BLDV-MCNC: 203 MG/DL — HIGH (ref 70–99)
GLUCOSE BLDV-MCNC: 83 MG/DL — SIGNIFICANT CHANGE UP (ref 70–99)
GLUCOSE BLDV-MCNC: 89 MG/DL — SIGNIFICANT CHANGE UP (ref 70–99)
GLUCOSE BLDV-MCNC: 97 MG/DL — SIGNIFICANT CHANGE UP (ref 70–99)
GLUCOSE SERPL-MCNC: 100 MG/DL — HIGH (ref 70–99)
GLUCOSE SERPL-MCNC: 101 MG/DL — HIGH (ref 70–99)
GLUCOSE SERPL-MCNC: 101 MG/DL — HIGH (ref 70–99)
GLUCOSE SERPL-MCNC: 103 MG/DL — HIGH (ref 70–99)
GLUCOSE SERPL-MCNC: 104 MG/DL — HIGH (ref 70–99)
GLUCOSE SERPL-MCNC: 105 MG/DL — HIGH (ref 70–99)
GLUCOSE SERPL-MCNC: 106 MG/DL — HIGH (ref 70–99)
GLUCOSE SERPL-MCNC: 107 MG/DL — HIGH (ref 70–99)
GLUCOSE SERPL-MCNC: 107 MG/DL — HIGH (ref 70–99)
GLUCOSE SERPL-MCNC: 108 MG/DL — HIGH (ref 70–99)
GLUCOSE SERPL-MCNC: 109 MG/DL — HIGH (ref 70–99)
GLUCOSE SERPL-MCNC: 110 MG/DL — HIGH (ref 70–99)
GLUCOSE SERPL-MCNC: 111 MG/DL — HIGH (ref 70–99)
GLUCOSE SERPL-MCNC: 111 MG/DL — HIGH (ref 70–99)
GLUCOSE SERPL-MCNC: 113 MG/DL — HIGH (ref 70–99)
GLUCOSE SERPL-MCNC: 114 MG/DL — HIGH (ref 70–99)
GLUCOSE SERPL-MCNC: 115 MG/DL — HIGH (ref 70–99)
GLUCOSE SERPL-MCNC: 116 MG/DL — HIGH (ref 70–99)
GLUCOSE SERPL-MCNC: 117 MG/DL — HIGH (ref 70–99)
GLUCOSE SERPL-MCNC: 118 MG/DL — HIGH (ref 70–99)
GLUCOSE SERPL-MCNC: 118 MG/DL — HIGH (ref 70–99)
GLUCOSE SERPL-MCNC: 119 MG/DL — HIGH (ref 70–99)
GLUCOSE SERPL-MCNC: 121 MG/DL — HIGH (ref 70–99)
GLUCOSE SERPL-MCNC: 121 MG/DL — HIGH (ref 70–99)
GLUCOSE SERPL-MCNC: 122 MG/DL — HIGH (ref 70–99)
GLUCOSE SERPL-MCNC: 123 MG/DL — HIGH (ref 70–99)
GLUCOSE SERPL-MCNC: 124 MG/DL — HIGH (ref 70–99)
GLUCOSE SERPL-MCNC: 125 MG/DL — HIGH (ref 70–99)
GLUCOSE SERPL-MCNC: 126 MG/DL — HIGH (ref 70–99)
GLUCOSE SERPL-MCNC: 126 MG/DL — HIGH (ref 70–99)
GLUCOSE SERPL-MCNC: 127 MG/DL — HIGH (ref 70–99)
GLUCOSE SERPL-MCNC: 128 MG/DL — HIGH (ref 70–99)
GLUCOSE SERPL-MCNC: 129 MG/DL — HIGH (ref 70–99)
GLUCOSE SERPL-MCNC: 130 MG/DL — HIGH (ref 70–99)
GLUCOSE SERPL-MCNC: 131 MG/DL — HIGH (ref 70–99)
GLUCOSE SERPL-MCNC: 131 MG/DL — HIGH (ref 70–99)
GLUCOSE SERPL-MCNC: 132 MG/DL — HIGH (ref 70–99)
GLUCOSE SERPL-MCNC: 133 MG/DL — HIGH (ref 70–99)
GLUCOSE SERPL-MCNC: 134 MG/DL — HIGH (ref 70–99)
GLUCOSE SERPL-MCNC: 134 MG/DL — HIGH (ref 70–99)
GLUCOSE SERPL-MCNC: 135 MG/DL — HIGH (ref 70–99)
GLUCOSE SERPL-MCNC: 135 MG/DL — HIGH (ref 70–99)
GLUCOSE SERPL-MCNC: 136 MG/DL — HIGH (ref 70–99)
GLUCOSE SERPL-MCNC: 137 MG/DL — HIGH (ref 70–99)
GLUCOSE SERPL-MCNC: 138 MG/DL — HIGH (ref 70–99)
GLUCOSE SERPL-MCNC: 139 MG/DL — HIGH (ref 70–99)
GLUCOSE SERPL-MCNC: 141 MG/DL — HIGH (ref 70–99)
GLUCOSE SERPL-MCNC: 141 MG/DL — HIGH (ref 70–99)
GLUCOSE SERPL-MCNC: 142 MG/DL — HIGH (ref 70–99)
GLUCOSE SERPL-MCNC: 143 MG/DL — HIGH (ref 70–99)
GLUCOSE SERPL-MCNC: 144 MG/DL — HIGH (ref 70–99)
GLUCOSE SERPL-MCNC: 144 MG/DL — HIGH (ref 70–99)
GLUCOSE SERPL-MCNC: 145 MG/DL — HIGH (ref 70–99)
GLUCOSE SERPL-MCNC: 145 MG/DL — HIGH (ref 70–99)
GLUCOSE SERPL-MCNC: 147 MG/DL — HIGH (ref 70–99)
GLUCOSE SERPL-MCNC: 147 MG/DL — HIGH (ref 70–99)
GLUCOSE SERPL-MCNC: 148 MG/DL — HIGH (ref 70–99)
GLUCOSE SERPL-MCNC: 149 MG/DL — HIGH (ref 70–99)
GLUCOSE SERPL-MCNC: 151 MG/DL — HIGH (ref 70–99)
GLUCOSE SERPL-MCNC: 153 MG/DL — HIGH (ref 70–99)
GLUCOSE SERPL-MCNC: 153 MG/DL — HIGH (ref 70–99)
GLUCOSE SERPL-MCNC: 154 MG/DL — HIGH (ref 70–99)
GLUCOSE SERPL-MCNC: 154 MG/DL — HIGH (ref 70–99)
GLUCOSE SERPL-MCNC: 155 MG/DL — HIGH (ref 70–99)
GLUCOSE SERPL-MCNC: 155 MG/DL — HIGH (ref 70–99)
GLUCOSE SERPL-MCNC: 156 MG/DL — HIGH (ref 70–99)
GLUCOSE SERPL-MCNC: 156 MG/DL — HIGH (ref 70–99)
GLUCOSE SERPL-MCNC: 157 MG/DL — HIGH (ref 70–99)
GLUCOSE SERPL-MCNC: 158 MG/DL — HIGH (ref 70–99)
GLUCOSE SERPL-MCNC: 160 MG/DL — HIGH (ref 70–99)
GLUCOSE SERPL-MCNC: 160 MG/DL — HIGH (ref 70–99)
GLUCOSE SERPL-MCNC: 161 MG/DL — HIGH (ref 70–99)
GLUCOSE SERPL-MCNC: 162 MG/DL — HIGH (ref 70–99)
GLUCOSE SERPL-MCNC: 163 MG/DL — HIGH (ref 70–99)
GLUCOSE SERPL-MCNC: 165 MG/DL — HIGH (ref 70–99)
GLUCOSE SERPL-MCNC: 168 MG/DL — HIGH (ref 70–99)
GLUCOSE SERPL-MCNC: 168 MG/DL — HIGH (ref 70–99)
GLUCOSE SERPL-MCNC: 172 MG/DL — HIGH (ref 70–99)
GLUCOSE SERPL-MCNC: 173 MG/DL — HIGH (ref 70–99)
GLUCOSE SERPL-MCNC: 173 MG/DL — HIGH (ref 70–99)
GLUCOSE SERPL-MCNC: 174 MG/DL — HIGH (ref 70–99)
GLUCOSE SERPL-MCNC: 177 MG/DL — HIGH (ref 70–99)
GLUCOSE SERPL-MCNC: 178 MG/DL — HIGH (ref 70–99)
GLUCOSE SERPL-MCNC: 179 MG/DL — HIGH (ref 70–99)
GLUCOSE SERPL-MCNC: 181 MG/DL — HIGH (ref 70–99)
GLUCOSE SERPL-MCNC: 183 MG/DL — HIGH (ref 70–99)
GLUCOSE SERPL-MCNC: 184 MG/DL — HIGH (ref 70–99)
GLUCOSE SERPL-MCNC: 187 MG/DL — HIGH (ref 70–99)
GLUCOSE SERPL-MCNC: 189 MG/DL — HIGH (ref 70–99)
GLUCOSE SERPL-MCNC: 193 MG/DL — HIGH (ref 70–99)
GLUCOSE SERPL-MCNC: 198 MG/DL — HIGH (ref 70–99)
GLUCOSE SERPL-MCNC: 203 MG/DL — HIGH (ref 70–99)
GLUCOSE SERPL-MCNC: 209 MG/DL — HIGH (ref 70–99)
GLUCOSE SERPL-MCNC: 214 MG/DL — HIGH (ref 70–99)
GLUCOSE SERPL-MCNC: 221 MG/DL — HIGH (ref 70–99)
GLUCOSE SERPL-MCNC: 225 MG/DL — HIGH (ref 70–99)
GLUCOSE SERPL-MCNC: 251 MG/DL — HIGH (ref 70–99)
GLUCOSE SERPL-MCNC: 268 MG/DL — HIGH (ref 70–99)
GLUCOSE SERPL-MCNC: 288 MG/DL — HIGH (ref 70–99)
GLUCOSE SERPL-MCNC: 67 MG/DL — LOW (ref 70–99)
GLUCOSE SERPL-MCNC: 69 MG/DL — LOW (ref 70–99)
GLUCOSE SERPL-MCNC: 75 MG/DL — SIGNIFICANT CHANGE UP (ref 70–99)
GLUCOSE SERPL-MCNC: 76 MG/DL — SIGNIFICANT CHANGE UP (ref 70–99)
GLUCOSE SERPL-MCNC: 78 MG/DL — SIGNIFICANT CHANGE UP (ref 70–99)
GLUCOSE SERPL-MCNC: 81 MG/DL — SIGNIFICANT CHANGE UP (ref 70–99)
GLUCOSE SERPL-MCNC: 82 MG/DL — SIGNIFICANT CHANGE UP (ref 70–99)
GLUCOSE SERPL-MCNC: 82 MG/DL — SIGNIFICANT CHANGE UP (ref 70–99)
GLUCOSE SERPL-MCNC: 83 MG/DL — SIGNIFICANT CHANGE UP (ref 70–99)
GLUCOSE SERPL-MCNC: 83 MG/DL — SIGNIFICANT CHANGE UP (ref 70–99)
GLUCOSE SERPL-MCNC: 84 MG/DL — SIGNIFICANT CHANGE UP (ref 70–99)
GLUCOSE SERPL-MCNC: 84 MG/DL — SIGNIFICANT CHANGE UP (ref 70–99)
GLUCOSE SERPL-MCNC: 85 MG/DL — SIGNIFICANT CHANGE UP (ref 70–99)
GLUCOSE SERPL-MCNC: 85 MG/DL — SIGNIFICANT CHANGE UP (ref 70–99)
GLUCOSE SERPL-MCNC: 86 MG/DL — SIGNIFICANT CHANGE UP (ref 70–99)
GLUCOSE SERPL-MCNC: 87 MG/DL — SIGNIFICANT CHANGE UP (ref 70–99)
GLUCOSE SERPL-MCNC: 88 MG/DL — SIGNIFICANT CHANGE UP (ref 70–99)
GLUCOSE SERPL-MCNC: 89 MG/DL — SIGNIFICANT CHANGE UP (ref 70–99)
GLUCOSE SERPL-MCNC: 90 MG/DL — SIGNIFICANT CHANGE UP (ref 70–99)
GLUCOSE SERPL-MCNC: 91 MG/DL — SIGNIFICANT CHANGE UP (ref 70–99)
GLUCOSE SERPL-MCNC: 93 MG/DL — SIGNIFICANT CHANGE UP (ref 70–99)
GLUCOSE SERPL-MCNC: 93 MG/DL — SIGNIFICANT CHANGE UP (ref 70–99)
GLUCOSE SERPL-MCNC: 94 MG/DL — SIGNIFICANT CHANGE UP (ref 70–99)
GLUCOSE SERPL-MCNC: 94 MG/DL — SIGNIFICANT CHANGE UP (ref 70–99)
GLUCOSE SERPL-MCNC: 95 MG/DL — SIGNIFICANT CHANGE UP (ref 70–99)
GLUCOSE SERPL-MCNC: 96 MG/DL — SIGNIFICANT CHANGE UP (ref 70–99)
GLUCOSE SERPL-MCNC: 96 MG/DL — SIGNIFICANT CHANGE UP (ref 70–99)
GLUCOSE SERPL-MCNC: 97 MG/DL — SIGNIFICANT CHANGE UP (ref 70–99)
GLUCOSE SERPL-MCNC: 98 MG/DL — SIGNIFICANT CHANGE UP (ref 70–99)
GLUCOSE SERPL-MCNC: 99 MG/DL — SIGNIFICANT CHANGE UP (ref 70–99)
GLUCOSE SERPL-MCNC: 99 MG/DL — SIGNIFICANT CHANGE UP (ref 70–99)
GLUCOSE UR QL: NEGATIVE — SIGNIFICANT CHANGE UP
GRAM STN FLD: SIGNIFICANT CHANGE UP
HAPTOGLOB SERPL-MCNC: 103 MG/DL — SIGNIFICANT CHANGE UP (ref 34–200)
HAPTOGLOB SERPL-MCNC: 108 MG/DL — SIGNIFICANT CHANGE UP (ref 34–200)
HAPTOGLOB SERPL-MCNC: 110 MG/DL — SIGNIFICANT CHANGE UP (ref 34–200)
HAPTOGLOB SERPL-MCNC: 114 MG/DL — SIGNIFICANT CHANGE UP (ref 34–200)
HAPTOGLOB SERPL-MCNC: 115 MG/DL — SIGNIFICANT CHANGE UP (ref 34–200)
HAPTOGLOB SERPL-MCNC: 117 MG/DL — SIGNIFICANT CHANGE UP (ref 34–200)
HAPTOGLOB SERPL-MCNC: 118 MG/DL — SIGNIFICANT CHANGE UP (ref 34–200)
HAPTOGLOB SERPL-MCNC: 127 MG/DL — SIGNIFICANT CHANGE UP (ref 34–200)
HAPTOGLOB SERPL-MCNC: 129 MG/DL — SIGNIFICANT CHANGE UP (ref 34–200)
HAPTOGLOB SERPL-MCNC: 129 MG/DL — SIGNIFICANT CHANGE UP (ref 34–200)
HAPTOGLOB SERPL-MCNC: 138 MG/DL — SIGNIFICANT CHANGE UP (ref 34–200)
HAPTOGLOB SERPL-MCNC: 143 MG/DL — SIGNIFICANT CHANGE UP (ref 34–200)
HAPTOGLOB SERPL-MCNC: 150 MG/DL — SIGNIFICANT CHANGE UP (ref 34–200)
HAPTOGLOB SERPL-MCNC: 152 MG/DL — SIGNIFICANT CHANGE UP (ref 34–200)
HAPTOGLOB SERPL-MCNC: 153 MG/DL — SIGNIFICANT CHANGE UP (ref 34–200)
HAPTOGLOB SERPL-MCNC: 154 MG/DL — SIGNIFICANT CHANGE UP (ref 34–200)
HAPTOGLOB SERPL-MCNC: 156 MG/DL — SIGNIFICANT CHANGE UP (ref 34–200)
HAPTOGLOB SERPL-MCNC: 161 MG/DL — SIGNIFICANT CHANGE UP (ref 34–200)
HAPTOGLOB SERPL-MCNC: 162 MG/DL — SIGNIFICANT CHANGE UP (ref 34–200)
HAPTOGLOB SERPL-MCNC: 162 MG/DL — SIGNIFICANT CHANGE UP (ref 34–200)
HAPTOGLOB SERPL-MCNC: 164 MG/DL — SIGNIFICANT CHANGE UP (ref 34–200)
HAPTOGLOB SERPL-MCNC: 165 MG/DL — SIGNIFICANT CHANGE UP (ref 34–200)
HAPTOGLOB SERPL-MCNC: 166 MG/DL — SIGNIFICANT CHANGE UP (ref 34–200)
HAPTOGLOB SERPL-MCNC: 171 MG/DL — SIGNIFICANT CHANGE UP (ref 34–200)
HAPTOGLOB SERPL-MCNC: 172 MG/DL — SIGNIFICANT CHANGE UP (ref 34–200)
HAPTOGLOB SERPL-MCNC: 173 MG/DL — SIGNIFICANT CHANGE UP (ref 34–200)
HAPTOGLOB SERPL-MCNC: 175 MG/DL — SIGNIFICANT CHANGE UP (ref 34–200)
HAPTOGLOB SERPL-MCNC: 176 MG/DL — SIGNIFICANT CHANGE UP (ref 34–200)
HAPTOGLOB SERPL-MCNC: 179 MG/DL — SIGNIFICANT CHANGE UP (ref 34–200)
HAPTOGLOB SERPL-MCNC: 180 MG/DL — SIGNIFICANT CHANGE UP (ref 34–200)
HAPTOGLOB SERPL-MCNC: 182 MG/DL — SIGNIFICANT CHANGE UP (ref 34–200)
HAPTOGLOB SERPL-MCNC: 184 MG/DL — SIGNIFICANT CHANGE UP (ref 34–200)
HAPTOGLOB SERPL-MCNC: 185 MG/DL — SIGNIFICANT CHANGE UP (ref 34–200)
HAPTOGLOB SERPL-MCNC: 191 MG/DL — SIGNIFICANT CHANGE UP (ref 34–200)
HAPTOGLOB SERPL-MCNC: 194 MG/DL — SIGNIFICANT CHANGE UP (ref 34–200)
HAPTOGLOB SERPL-MCNC: 198 MG/DL — SIGNIFICANT CHANGE UP (ref 34–200)
HAPTOGLOB SERPL-MCNC: 199 MG/DL — SIGNIFICANT CHANGE UP (ref 34–200)
HAPTOGLOB SERPL-MCNC: 203 MG/DL — HIGH (ref 34–200)
HAPTOGLOB SERPL-MCNC: 209 MG/DL — HIGH (ref 34–200)
HAPTOGLOB SERPL-MCNC: 21 MG/DL — LOW (ref 34–200)
HAPTOGLOB SERPL-MCNC: 215 MG/DL — HIGH (ref 34–200)
HAPTOGLOB SERPL-MCNC: 216 MG/DL — HIGH (ref 34–200)
HAPTOGLOB SERPL-MCNC: 218 MG/DL — HIGH (ref 34–200)
HAPTOGLOB SERPL-MCNC: 22 MG/DL — LOW (ref 34–200)
HAPTOGLOB SERPL-MCNC: 220 MG/DL — HIGH (ref 34–200)
HAPTOGLOB SERPL-MCNC: 230 MG/DL — HIGH (ref 34–200)
HAPTOGLOB SERPL-MCNC: 235 MG/DL — HIGH (ref 34–200)
HAPTOGLOB SERPL-MCNC: 244 MG/DL — HIGH (ref 34–200)
HAPTOGLOB SERPL-MCNC: 252 MG/DL — HIGH (ref 34–200)
HAPTOGLOB SERPL-MCNC: 257 MG/DL — HIGH (ref 34–200)
HAPTOGLOB SERPL-MCNC: 26 MG/DL — LOW (ref 34–200)
HAPTOGLOB SERPL-MCNC: 262 MG/DL — HIGH (ref 34–200)
HAPTOGLOB SERPL-MCNC: 263 MG/DL — HIGH (ref 34–200)
HAPTOGLOB SERPL-MCNC: 267 MG/DL — HIGH (ref 34–200)
HAPTOGLOB SERPL-MCNC: 27 MG/DL — LOW (ref 34–200)
HAPTOGLOB SERPL-MCNC: 27 MG/DL — LOW (ref 34–200)
HAPTOGLOB SERPL-MCNC: 272 MG/DL — HIGH (ref 34–200)
HAPTOGLOB SERPL-MCNC: 28 MG/DL — LOW (ref 34–200)
HAPTOGLOB SERPL-MCNC: 290 MG/DL — HIGH (ref 34–200)
HAPTOGLOB SERPL-MCNC: 321 MG/DL — HIGH (ref 34–200)
HAPTOGLOB SERPL-MCNC: 343 MG/DL — HIGH (ref 34–200)
HAPTOGLOB SERPL-MCNC: 344 MG/DL — HIGH (ref 34–200)
HAPTOGLOB SERPL-MCNC: 36 MG/DL — SIGNIFICANT CHANGE UP (ref 34–200)
HAPTOGLOB SERPL-MCNC: 37 MG/DL — SIGNIFICANT CHANGE UP (ref 34–200)
HAPTOGLOB SERPL-MCNC: 378 MG/DL — HIGH (ref 34–200)
HAPTOGLOB SERPL-MCNC: 39 MG/DL — SIGNIFICANT CHANGE UP (ref 34–200)
HAPTOGLOB SERPL-MCNC: 392 MG/DL — HIGH (ref 34–200)
HAPTOGLOB SERPL-MCNC: 395 MG/DL — HIGH (ref 34–200)
HAPTOGLOB SERPL-MCNC: 41 MG/DL — SIGNIFICANT CHANGE UP (ref 34–200)
HAPTOGLOB SERPL-MCNC: 41 MG/DL — SIGNIFICANT CHANGE UP (ref 34–200)
HAPTOGLOB SERPL-MCNC: 42 MG/DL — SIGNIFICANT CHANGE UP (ref 34–200)
HAPTOGLOB SERPL-MCNC: 421 MG/DL — HIGH (ref 34–200)
HAPTOGLOB SERPL-MCNC: 43 MG/DL — SIGNIFICANT CHANGE UP (ref 34–200)
HAPTOGLOB SERPL-MCNC: 43 MG/DL — SIGNIFICANT CHANGE UP (ref 34–200)
HAPTOGLOB SERPL-MCNC: 433 MG/DL — HIGH (ref 34–200)
HAPTOGLOB SERPL-MCNC: 45 MG/DL — SIGNIFICANT CHANGE UP (ref 34–200)
HAPTOGLOB SERPL-MCNC: 46 MG/DL — SIGNIFICANT CHANGE UP (ref 34–200)
HAPTOGLOB SERPL-MCNC: 50 MG/DL — SIGNIFICANT CHANGE UP (ref 34–200)
HAPTOGLOB SERPL-MCNC: 51 MG/DL — SIGNIFICANT CHANGE UP (ref 34–200)
HAPTOGLOB SERPL-MCNC: 53 MG/DL — SIGNIFICANT CHANGE UP (ref 34–200)
HAPTOGLOB SERPL-MCNC: 54 MG/DL — SIGNIFICANT CHANGE UP (ref 34–200)
HAPTOGLOB SERPL-MCNC: 54 MG/DL — SIGNIFICANT CHANGE UP (ref 34–200)
HAPTOGLOB SERPL-MCNC: 55 MG/DL — SIGNIFICANT CHANGE UP (ref 34–200)
HAPTOGLOB SERPL-MCNC: 55 MG/DL — SIGNIFICANT CHANGE UP (ref 34–200)
HAPTOGLOB SERPL-MCNC: 56 MG/DL — SIGNIFICANT CHANGE UP (ref 34–200)
HAPTOGLOB SERPL-MCNC: 60 MG/DL — SIGNIFICANT CHANGE UP (ref 34–200)
HAPTOGLOB SERPL-MCNC: 62 MG/DL — SIGNIFICANT CHANGE UP (ref 34–200)
HAPTOGLOB SERPL-MCNC: 66 MG/DL — SIGNIFICANT CHANGE UP (ref 34–200)
HAPTOGLOB SERPL-MCNC: 67 MG/DL — SIGNIFICANT CHANGE UP (ref 34–200)
HAPTOGLOB SERPL-MCNC: 67 MG/DL — SIGNIFICANT CHANGE UP (ref 34–200)
HAPTOGLOB SERPL-MCNC: 68 MG/DL — SIGNIFICANT CHANGE UP (ref 34–200)
HAPTOGLOB SERPL-MCNC: 69 MG/DL — SIGNIFICANT CHANGE UP (ref 34–200)
HAPTOGLOB SERPL-MCNC: 71 MG/DL — SIGNIFICANT CHANGE UP (ref 34–200)
HAPTOGLOB SERPL-MCNC: 71 MG/DL — SIGNIFICANT CHANGE UP (ref 34–200)
HAPTOGLOB SERPL-MCNC: 74 MG/DL — SIGNIFICANT CHANGE UP (ref 34–200)
HAPTOGLOB SERPL-MCNC: 76 MG/DL — SIGNIFICANT CHANGE UP (ref 34–200)
HAPTOGLOB SERPL-MCNC: 80 MG/DL — SIGNIFICANT CHANGE UP (ref 34–200)
HAPTOGLOB SERPL-MCNC: 81 MG/DL — SIGNIFICANT CHANGE UP (ref 34–200)
HAPTOGLOB SERPL-MCNC: 84 MG/DL — SIGNIFICANT CHANGE UP (ref 34–200)
HAPTOGLOB SERPL-MCNC: 85 MG/DL — SIGNIFICANT CHANGE UP (ref 34–200)
HAPTOGLOB SERPL-MCNC: 91 MG/DL — SIGNIFICANT CHANGE UP (ref 34–200)
HAPTOGLOB SERPL-MCNC: 91 MG/DL — SIGNIFICANT CHANGE UP (ref 34–200)
HAPTOGLOB SERPL-MCNC: 95 MG/DL — SIGNIFICANT CHANGE UP (ref 34–200)
HAPTOGLOB SERPL-MCNC: 96 MG/DL — SIGNIFICANT CHANGE UP (ref 34–200)
HAPTOGLOB SERPL-MCNC: <20 MG/DL — LOW (ref 34–200)
HBV DNA # SERPL NAA+PROBE: SIGNIFICANT CHANGE UP
HBV DNA SERPL NAA+PROBE-LOG#: SIGNIFICANT CHANGE UP LOG10IU/ML
HCO3 BLDA-SCNC: 28 MMOL/L — SIGNIFICANT CHANGE UP (ref 21–28)
HCO3 BLDMV-SCNC: 21 MMOL/L — SIGNIFICANT CHANGE UP (ref 20–28)
HCO3 BLDMV-SCNC: 23 MMOL/L — SIGNIFICANT CHANGE UP (ref 20–28)
HCO3 BLDMV-SCNC: 23 MMOL/L — SIGNIFICANT CHANGE UP (ref 20–28)
HCO3 BLDV-SCNC: 19 MMOL/L — LOW (ref 22–29)
HCO3 BLDV-SCNC: 20 MMOL/L — LOW (ref 22–29)
HCO3 BLDV-SCNC: 21 MMOL/L — SIGNIFICANT CHANGE UP (ref 21–29)
HCO3 BLDV-SCNC: 22 MMOL/L — SIGNIFICANT CHANGE UP (ref 21–29)
HCO3 BLDV-SCNC: 22 MMOL/L — SIGNIFICANT CHANGE UP (ref 21–29)
HCO3 BLDV-SCNC: 23 MMOL/L — SIGNIFICANT CHANGE UP (ref 21–29)
HCO3 BLDV-SCNC: 24 MMOL/L — SIGNIFICANT CHANGE UP (ref 21–29)
HCO3 BLDV-SCNC: 24 MMOL/L — SIGNIFICANT CHANGE UP (ref 22–29)
HCO3 BLDV-SCNC: 25 MMOL/L — SIGNIFICANT CHANGE UP (ref 21–29)
HCO3 BLDV-SCNC: 25 MMOL/L — SIGNIFICANT CHANGE UP (ref 21–29)
HCO3 BLDV-SCNC: 26 MMOL/L — SIGNIFICANT CHANGE UP (ref 22–29)
HCO3 BLDV-SCNC: 27 MMOL/L — SIGNIFICANT CHANGE UP (ref 22–29)
HCO3 BLDV-SCNC: 27 MMOL/L — SIGNIFICANT CHANGE UP (ref 22–29)
HCO3 BLDV-SCNC: 28 MMOL/L — SIGNIFICANT CHANGE UP (ref 22–29)
HCO3 BLDV-SCNC: 29 MMOL/L — SIGNIFICANT CHANGE UP (ref 22–29)
HCT VFR BLD CALC: 16.3 % — CRITICAL LOW (ref 39–50)
HCT VFR BLD CALC: 17.2 % — CRITICAL LOW (ref 39–50)
HCT VFR BLD CALC: 20.3 % — CRITICAL LOW (ref 39–50)
HCT VFR BLD CALC: 20.6 % — CRITICAL LOW (ref 39–50)
HCT VFR BLD CALC: 21.2 % — LOW (ref 39–50)
HCT VFR BLD CALC: 22 % — LOW (ref 39–50)
HCT VFR BLD CALC: 22.2 % — LOW (ref 39–50)
HCT VFR BLD CALC: 22.4 % — LOW (ref 39–50)
HCT VFR BLD CALC: 23 % — LOW (ref 39–50)
HCT VFR BLD CALC: 23.4 % — LOW (ref 39–50)
HCT VFR BLD CALC: 23.5 % — LOW (ref 39–50)
HCT VFR BLD CALC: 23.5 % — LOW (ref 39–50)
HCT VFR BLD CALC: 23.8 % — LOW (ref 39–50)
HCT VFR BLD CALC: 24.1 % — LOW (ref 39–50)
HCT VFR BLD CALC: 24.2 % — LOW (ref 39–50)
HCT VFR BLD CALC: 24.3 % — LOW (ref 39–50)
HCT VFR BLD CALC: 24.4 % — LOW (ref 39–50)
HCT VFR BLD CALC: 24.5 % — LOW (ref 39–50)
HCT VFR BLD CALC: 24.5 % — LOW (ref 39–50)
HCT VFR BLD CALC: 24.6 % — LOW (ref 39–50)
HCT VFR BLD CALC: 24.7 % — LOW (ref 39–50)
HCT VFR BLD CALC: 24.9 % — LOW (ref 39–50)
HCT VFR BLD CALC: 25 % — LOW (ref 39–50)
HCT VFR BLD CALC: 25.1 % — LOW (ref 39–50)
HCT VFR BLD CALC: 25.3 % — LOW (ref 39–50)
HCT VFR BLD CALC: 25.3 % — LOW (ref 39–50)
HCT VFR BLD CALC: 25.4 % — LOW (ref 39–50)
HCT VFR BLD CALC: 25.5 % — LOW (ref 39–50)
HCT VFR BLD CALC: 25.6 % — LOW (ref 39–50)
HCT VFR BLD CALC: 25.7 % — LOW (ref 39–50)
HCT VFR BLD CALC: 25.7 % — LOW (ref 39–50)
HCT VFR BLD CALC: 25.8 % — LOW (ref 39–50)
HCT VFR BLD CALC: 25.9 % — LOW (ref 39–50)
HCT VFR BLD CALC: 25.9 % — LOW (ref 39–50)
HCT VFR BLD CALC: 26 % — LOW (ref 39–50)
HCT VFR BLD CALC: 26.1 % — LOW (ref 39–50)
HCT VFR BLD CALC: 26.2 % — LOW (ref 39–50)
HCT VFR BLD CALC: 26.3 % — LOW (ref 39–50)
HCT VFR BLD CALC: 26.4 % — LOW (ref 39–50)
HCT VFR BLD CALC: 26.5 % — LOW (ref 39–50)
HCT VFR BLD CALC: 26.6 % — LOW (ref 39–50)
HCT VFR BLD CALC: 26.7 % — LOW (ref 39–50)
HCT VFR BLD CALC: 26.7 % — LOW (ref 39–50)
HCT VFR BLD CALC: 26.8 % — LOW (ref 39–50)
HCT VFR BLD CALC: 26.9 % — LOW (ref 39–50)
HCT VFR BLD CALC: 26.9 % — LOW (ref 39–50)
HCT VFR BLD CALC: 27 % — LOW (ref 39–50)
HCT VFR BLD CALC: 27.1 % — LOW (ref 39–50)
HCT VFR BLD CALC: 27.1 % — LOW (ref 39–50)
HCT VFR BLD CALC: 27.2 % — LOW (ref 39–50)
HCT VFR BLD CALC: 27.4 % — LOW (ref 39–50)
HCT VFR BLD CALC: 27.4 % — LOW (ref 39–50)
HCT VFR BLD CALC: 27.5 % — LOW (ref 39–50)
HCT VFR BLD CALC: 27.6 % — LOW (ref 39–50)
HCT VFR BLD CALC: 27.6 % — LOW (ref 39–50)
HCT VFR BLD CALC: 27.7 % — LOW (ref 39–50)
HCT VFR BLD CALC: 27.8 % — LOW (ref 39–50)
HCT VFR BLD CALC: 27.8 % — LOW (ref 39–50)
HCT VFR BLD CALC: 27.9 % — LOW (ref 39–50)
HCT VFR BLD CALC: 28 % — LOW (ref 39–50)
HCT VFR BLD CALC: 28 % — LOW (ref 39–50)
HCT VFR BLD CALC: 28.2 % — LOW (ref 39–50)
HCT VFR BLD CALC: 28.3 % — LOW (ref 39–50)
HCT VFR BLD CALC: 28.4 % — LOW (ref 39–50)
HCT VFR BLD CALC: 28.5 % — LOW (ref 39–50)
HCT VFR BLD CALC: 28.6 % — LOW (ref 39–50)
HCT VFR BLD CALC: 28.8 % — LOW (ref 39–50)
HCT VFR BLD CALC: 28.9 % — LOW (ref 39–50)
HCT VFR BLD CALC: 28.9 % — LOW (ref 39–50)
HCT VFR BLD CALC: 29 % — LOW (ref 39–50)
HCT VFR BLD CALC: 29.1 % — LOW (ref 39–50)
HCT VFR BLD CALC: 29.1 % — LOW (ref 39–50)
HCT VFR BLD CALC: 29.2 % — LOW (ref 39–50)
HCT VFR BLD CALC: 29.3 % — LOW (ref 39–50)
HCT VFR BLD CALC: 29.3 % — LOW (ref 39–50)
HCT VFR BLD CALC: 29.4 % — LOW (ref 39–50)
HCT VFR BLD CALC: 29.5 % — LOW (ref 39–50)
HCT VFR BLD CALC: 29.5 % — LOW (ref 39–50)
HCT VFR BLD CALC: 29.6 % — LOW (ref 39–50)
HCT VFR BLD CALC: 29.6 % — LOW (ref 39–50)
HCT VFR BLD CALC: 29.7 % — LOW (ref 39–50)
HCT VFR BLD CALC: 29.8 % — LOW (ref 39–50)
HCT VFR BLD CALC: 29.8 % — LOW (ref 39–50)
HCT VFR BLD CALC: 29.9 % — LOW (ref 39–50)
HCT VFR BLD CALC: 29.9 % — LOW (ref 39–50)
HCT VFR BLD CALC: 30 % — LOW (ref 39–50)
HCT VFR BLD CALC: 30.1 % — LOW (ref 39–50)
HCT VFR BLD CALC: 30.2 % — LOW (ref 39–50)
HCT VFR BLD CALC: 30.3 % — LOW (ref 39–50)
HCT VFR BLD CALC: 30.3 % — LOW (ref 39–50)
HCT VFR BLD CALC: 30.5 % — LOW (ref 39–50)
HCT VFR BLD CALC: 30.6 % — LOW (ref 39–50)
HCT VFR BLD CALC: 30.7 % — LOW (ref 39–50)
HCT VFR BLD CALC: 30.8 % — LOW (ref 39–50)
HCT VFR BLD CALC: 30.9 % — LOW (ref 39–50)
HCT VFR BLD CALC: 31 % — LOW (ref 39–50)
HCT VFR BLD CALC: 31.2 % — LOW (ref 39–50)
HCT VFR BLD CALC: 31.5 % — LOW (ref 39–50)
HCT VFR BLD CALC: 31.5 % — LOW (ref 39–50)
HCT VFR BLD CALC: 31.6 % — LOW (ref 39–50)
HCT VFR BLD CALC: 31.6 % — LOW (ref 39–50)
HCT VFR BLD CALC: 31.7 % — LOW (ref 39–50)
HCT VFR BLD CALC: 31.7 % — LOW (ref 39–50)
HCT VFR BLD CALC: 31.8 % — LOW (ref 39–50)
HCT VFR BLD CALC: 31.9 % — LOW (ref 39–50)
HCT VFR BLD CALC: 32 % — LOW (ref 39–50)
HCT VFR BLD CALC: 32.1 % — LOW (ref 39–50)
HCT VFR BLD CALC: 32.3 % — LOW (ref 39–50)
HCT VFR BLD CALC: 32.4 % — LOW (ref 39–50)
HCT VFR BLD CALC: 32.6 % — LOW (ref 39–50)
HCT VFR BLD CALC: 32.6 % — LOW (ref 39–50)
HCT VFR BLD CALC: 32.7 % — LOW (ref 39–50)
HCT VFR BLD CALC: 32.7 % — LOW (ref 39–50)
HCT VFR BLD CALC: 32.9 % — LOW (ref 39–50)
HCT VFR BLD CALC: 33.1 % — LOW (ref 39–50)
HCT VFR BLD CALC: 33.3 % — LOW (ref 39–50)
HCT VFR BLD CALC: 33.6 % — LOW (ref 39–50)
HCT VFR BLD CALC: 33.7 % — LOW (ref 39–50)
HCT VFR BLD CALC: 33.8 % — LOW (ref 39–50)
HCT VFR BLD CALC: 33.8 % — LOW (ref 39–50)
HCT VFR BLD CALC: 33.9 % — LOW (ref 39–50)
HCT VFR BLD CALC: 33.9 % — LOW (ref 39–50)
HCT VFR BLD CALC: 34 % — LOW (ref 39–50)
HCT VFR BLD CALC: 34.1 % — LOW (ref 39–50)
HCT VFR BLD CALC: 34.3 % — LOW (ref 39–50)
HCT VFR BLD CALC: 34.3 % — LOW (ref 39–50)
HCT VFR BLD CALC: 34.4 % — LOW (ref 39–50)
HCT VFR BLD CALC: 35 % — LOW (ref 39–50)
HCT VFR BLD CALC: 35.9 % — LOW (ref 39–50)
HCT VFR BLD CALC: 35.9 % — LOW (ref 39–50)
HCT VFR BLD CALC: 36.4 % — LOW (ref 39–50)
HCT VFR BLD CALC: 36.8 % — LOW (ref 39–50)
HCT VFR BLD CALC: 37.1 % — LOW (ref 39–50)
HCT VFR BLD CALC: 37.1 % — LOW (ref 39–50)
HCT VFR BLD CALC: 37.3 % — LOW (ref 39–50)
HCT VFR BLD CALC: 37.5 % — LOW (ref 39–50)
HCT VFR BLD CALC: 40.9 % — SIGNIFICANT CHANGE UP (ref 39–50)
HCT VFR BLD CALC: 43 % — SIGNIFICANT CHANGE UP (ref 39–50)
HCT VFR BLDA CALC: 15 % — CRITICAL LOW (ref 39–50)
HCT VFR BLDA CALC: 21 % — CRITICAL LOW (ref 39–51)
HCT VFR BLDA CALC: 26 % — LOW (ref 39–50)
HCT VFR BLDA CALC: 27 % — LOW (ref 39–50)
HCT VFR BLDA CALC: 27 % — LOW (ref 39–50)
HCT VFR BLDA CALC: 27 % — LOW (ref 39–51)
HCT VFR BLDA CALC: 27 % — LOW (ref 39–51)
HCT VFR BLDA CALC: 29 % — LOW (ref 39–50)
HCT VFR BLDA CALC: 29 % — LOW (ref 39–51)
HCT VFR BLDA CALC: 30 % — LOW (ref 39–50)
HCT VFR BLDA CALC: 30 % — LOW (ref 39–51)
HDLC SERPL-MCNC: 34 MG/DL — LOW
HGB BLD CALC-MCNC: 10 G/DL — LOW (ref 12.6–17.4)
HGB BLD CALC-MCNC: 4.8 G/DL — CRITICAL LOW (ref 13–17)
HGB BLD CALC-MCNC: 7.1 G/DL — LOW (ref 12.6–17.4)
HGB BLD CALC-MCNC: 8.3 G/DL — LOW (ref 13–17)
HGB BLD CALC-MCNC: 8.7 G/DL — LOW (ref 13–17)
HGB BLD CALC-MCNC: 8.7 G/DL — LOW (ref 13–17)
HGB BLD CALC-MCNC: 8.9 G/DL — LOW (ref 12.6–17.4)
HGB BLD CALC-MCNC: 9.1 G/DL — LOW (ref 12.6–17.4)
HGB BLD CALC-MCNC: 9.2 G/DL — LOW (ref 13–17)
HGB BLD CALC-MCNC: 9.3 G/DL — LOW (ref 13–17)
HGB BLD CALC-MCNC: 9.4 G/DL — LOW (ref 13–17)
HGB BLD CALC-MCNC: 9.6 G/DL — LOW (ref 13–17)
HGB BLD CALC-MCNC: 9.8 G/DL — LOW (ref 12.6–17.4)
HGB BLD-MCNC: 10 G/DL — LOW (ref 13–17)
HGB BLD-MCNC: 10.1 G/DL — LOW (ref 13–17)
HGB BLD-MCNC: 10.2 G/DL — LOW (ref 13–17)
HGB BLD-MCNC: 10.3 G/DL — LOW (ref 13–17)
HGB BLD-MCNC: 10.4 G/DL — LOW (ref 13–17)
HGB BLD-MCNC: 10.5 G/DL — LOW (ref 13–17)
HGB BLD-MCNC: 10.6 G/DL — LOW (ref 13–17)
HGB BLD-MCNC: 10.6 G/DL — LOW (ref 13–17)
HGB BLD-MCNC: 10.7 G/DL — LOW (ref 13–17)
HGB BLD-MCNC: 10.8 G/DL — LOW (ref 13–17)
HGB BLD-MCNC: 10.9 G/DL — LOW (ref 13–17)
HGB BLD-MCNC: 11 G/DL — LOW (ref 13–17)
HGB BLD-MCNC: 11 G/DL — LOW (ref 13–17)
HGB BLD-MCNC: 11.1 G/DL — LOW (ref 13–17)
HGB BLD-MCNC: 11.1 G/DL — LOW (ref 13–17)
HGB BLD-MCNC: 11.2 G/DL — LOW (ref 13–17)
HGB BLD-MCNC: 11.3 G/DL — LOW (ref 13–17)
HGB BLD-MCNC: 11.3 G/DL — LOW (ref 13–17)
HGB BLD-MCNC: 11.5 G/DL — LOW (ref 13–17)
HGB BLD-MCNC: 11.5 G/DL — LOW (ref 13–17)
HGB BLD-MCNC: 11.6 G/DL — LOW (ref 13–17)
HGB BLD-MCNC: 11.7 G/DL — LOW (ref 13–17)
HGB BLD-MCNC: 12 G/DL — LOW (ref 13–17)
HGB BLD-MCNC: 12.4 G/DL — LOW (ref 13–17)
HGB BLD-MCNC: 13 G/DL — SIGNIFICANT CHANGE UP (ref 13–17)
HGB BLD-MCNC: 4.5 G/DL — CRITICAL LOW (ref 13–17)
HGB BLD-MCNC: 5.4 G/DL — CRITICAL LOW (ref 13–17)
HGB BLD-MCNC: 6.4 G/DL — CRITICAL LOW (ref 13–17)
HGB BLD-MCNC: 6.7 G/DL — CRITICAL LOW (ref 13–17)
HGB BLD-MCNC: 6.8 G/DL — CRITICAL LOW (ref 13–17)
HGB BLD-MCNC: 7 G/DL — CRITICAL LOW (ref 13–17)
HGB BLD-MCNC: 7 G/DL — CRITICAL LOW (ref 13–17)
HGB BLD-MCNC: 7.1 G/DL — LOW (ref 13–17)
HGB BLD-MCNC: 7.3 G/DL — LOW (ref 13–17)
HGB BLD-MCNC: 7.5 G/DL — LOW (ref 13–17)
HGB BLD-MCNC: 7.6 G/DL — LOW (ref 13–17)
HGB BLD-MCNC: 7.7 G/DL — LOW (ref 13–17)
HGB BLD-MCNC: 7.8 G/DL — LOW (ref 13–17)
HGB BLD-MCNC: 7.9 G/DL — LOW (ref 13–17)
HGB BLD-MCNC: 8 G/DL — LOW (ref 13–17)
HGB BLD-MCNC: 8.1 G/DL — LOW (ref 13–17)
HGB BLD-MCNC: 8.2 G/DL — LOW (ref 13–17)
HGB BLD-MCNC: 8.3 G/DL — LOW (ref 13–17)
HGB BLD-MCNC: 8.4 G/DL — LOW (ref 13–17)
HGB BLD-MCNC: 8.5 G/DL — LOW (ref 13–17)
HGB BLD-MCNC: 8.6 G/DL — LOW (ref 13–17)
HGB BLD-MCNC: 8.7 G/DL — LOW (ref 13–17)
HGB BLD-MCNC: 8.8 G/DL — LOW (ref 13–17)
HGB BLD-MCNC: 8.9 G/DL — LOW (ref 13–17)
HGB BLD-MCNC: 9 G/DL — LOW (ref 13–17)
HGB BLD-MCNC: 9.1 G/DL — LOW (ref 13–17)
HGB BLD-MCNC: 9.2 G/DL — LOW (ref 13–17)
HGB BLD-MCNC: 9.3 G/DL — LOW (ref 13–17)
HGB BLD-MCNC: 9.4 G/DL — LOW (ref 13–17)
HGB BLD-MCNC: 9.5 G/DL — LOW (ref 13–17)
HGB BLD-MCNC: 9.6 G/DL — LOW (ref 13–17)
HGB BLD-MCNC: 9.7 G/DL — LOW (ref 13–17)
HGB BLD-MCNC: 9.8 G/DL — LOW (ref 13–17)
HGB BLD-MCNC: 9.9 G/DL — LOW (ref 13–17)
HGB FREE PLAS-MCNC: 27.2 MG/DL — HIGH (ref 0–4.9)
HOROWITZ INDEX BLDA+IHG-RTO: 40 — SIGNIFICANT CHANGE UP
HOROWITZ INDEX BLDMV+IHG-RTO: 50 — SIGNIFICANT CHANGE UP
HOROWITZ INDEX BLDV+IHG-RTO: 100 — SIGNIFICANT CHANGE UP
HOROWITZ INDEX BLDV+IHG-RTO: 21 — SIGNIFICANT CHANGE UP
HOROWITZ INDEX BLDV+IHG-RTO: 32 — SIGNIFICANT CHANGE UP
HOROWITZ INDEX BLDV+IHG-RTO: 35 — SIGNIFICANT CHANGE UP
HOROWITZ INDEX BLDV+IHG-RTO: 40 — SIGNIFICANT CHANGE UP
HOROWITZ INDEX BLDV+IHG-RTO: 50 — SIGNIFICANT CHANGE UP
HOROWITZ INDEX BLDV+IHG-RTO: 80 — SIGNIFICANT CHANGE UP
HOROWITZ INDEX BLDV+IHG-RTO: SIGNIFICANT CHANGE UP
HYALINE CASTS # UR AUTO: 1 /LPF — SIGNIFICANT CHANGE UP (ref 0–2)
HYALINE CASTS # UR AUTO: 1 /LPF — SIGNIFICANT CHANGE UP (ref 0–2)
HYALINE CASTS # UR AUTO: 2 /LPF — SIGNIFICANT CHANGE UP (ref 0–2)
HYALINE CASTS # UR AUTO: 3 /LPF — HIGH (ref 0–2)
HYALINE CASTS # UR AUTO: 3 /LPF — SIGNIFICANT CHANGE UP (ref 0–7)
HYALINE CASTS # UR AUTO: 5 /LPF — HIGH (ref 0–2)
HYALINE CASTS # UR AUTO: 8 /LPF — HIGH (ref 0–2)
HYPOCHROMIA BLD QL: SIGNIFICANT CHANGE UP
IGA FLD-MCNC: 359 MG/DL — SIGNIFICANT CHANGE UP (ref 84–499)
IGG FLD-MCNC: 2458 MG/DL — HIGH (ref 610–1660)
IGG SERPL-MCNC: 2205 MG/DL — HIGH (ref 603–1613)
IGG1 SER-MCNC: 982 MG/DL — HIGH (ref 248–810)
IGG2 SER-MCNC: 914 MG/DL — HIGH (ref 130–555)
IGG3 SER-MCNC: 176 MG/DL — HIGH (ref 15–102)
IGG4 SER-MCNC: 130 MG/DL — HIGH (ref 2–96)
IGG4 SER-MCNC: 144 MG/DL — HIGH (ref 2–96)
IGM SERPL-MCNC: 101 MG/DL — SIGNIFICANT CHANGE UP (ref 35–242)
IL6 FLD-MCNC: 31.3 PG/ML — HIGH (ref 0–13)
IMM GRANULOCYTES NFR BLD AUTO: 0.2 % — SIGNIFICANT CHANGE UP (ref 0–1.5)
IMM GRANULOCYTES NFR BLD AUTO: 0.4 % — SIGNIFICANT CHANGE UP (ref 0–1.5)
IMM GRANULOCYTES NFR BLD AUTO: 0.6 % — SIGNIFICANT CHANGE UP (ref 0–1.5)
IMM GRANULOCYTES NFR BLD AUTO: 0.6 % — SIGNIFICANT CHANGE UP (ref 0–1.5)
IMM GRANULOCYTES NFR BLD AUTO: 1.6 % — HIGH (ref 0–1.5)
IMM GRANULOCYTES NFR BLD AUTO: 1.8 % — HIGH (ref 0–1.5)
IMM GRANULOCYTES NFR BLD AUTO: 2.1 % — HIGH (ref 0–1.5)
IMM GRANULOCYTES NFR BLD AUTO: 3.6 % — HIGH (ref 0–1.5)
INR BLD: 0.96 RATIO — SIGNIFICANT CHANGE UP (ref 0.88–1.16)
INR BLD: 1.06 RATIO — SIGNIFICANT CHANGE UP (ref 0.88–1.16)
INR BLD: 1.06 RATIO — SIGNIFICANT CHANGE UP (ref 0.88–1.16)
INR BLD: 1.09 RATIO — SIGNIFICANT CHANGE UP (ref 0.88–1.16)
INR BLD: 1.1 RATIO — SIGNIFICANT CHANGE UP (ref 0.88–1.16)
INR BLD: 1.11 RATIO — SIGNIFICANT CHANGE UP (ref 0.88–1.16)
INR BLD: 1.13 RATIO — SIGNIFICANT CHANGE UP (ref 0.88–1.16)
INR BLD: 1.14 RATIO — SIGNIFICANT CHANGE UP (ref 0.88–1.16)
INR BLD: 1.15 RATIO — SIGNIFICANT CHANGE UP (ref 0.88–1.16)
INR BLD: 1.15 RATIO — SIGNIFICANT CHANGE UP (ref 0.88–1.16)
INR BLD: 1.17 RATIO — HIGH (ref 0.88–1.16)
INR BLD: 1.18 RATIO — HIGH (ref 0.88–1.16)
INR BLD: 1.19 RATIO — HIGH (ref 0.88–1.16)
INR BLD: 1.2 RATIO — HIGH (ref 0.88–1.16)
INR BLD: 1.21 RATIO — HIGH (ref 0.88–1.16)
INR BLD: 1.23 RATIO — HIGH (ref 0.88–1.16)
INR BLD: 1.24 RATIO — HIGH (ref 0.88–1.16)
INR BLD: 1.25 RATIO — HIGH (ref 0.88–1.16)
INR BLD: 1.26 RATIO — HIGH (ref 0.88–1.16)
INR BLD: 1.27 RATIO — HIGH (ref 0.88–1.16)
INR BLD: 1.28 RATIO — HIGH (ref 0.88–1.16)
INR BLD: 1.29 RATIO — HIGH (ref 0.88–1.16)
INR BLD: 1.29 RATIO — HIGH (ref 0.88–1.16)
INR BLD: 1.3 RATIO — HIGH (ref 0.88–1.16)
INR BLD: 1.3 RATIO — HIGH (ref 0.88–1.16)
INR BLD: 1.32 RATIO — HIGH (ref 0.88–1.16)
INR BLD: 1.32 RATIO — HIGH (ref 0.88–1.16)
INR BLD: 1.33 RATIO — HIGH (ref 0.88–1.16)
INR BLD: 1.38 RATIO — HIGH (ref 0.88–1.16)
INR BLD: 1.42 RATIO — HIGH (ref 0.88–1.16)
INR BLD: 1.48 RATIO — HIGH (ref 0.88–1.16)
INR BLD: 1.48 RATIO — HIGH (ref 0.88–1.16)
INR BLD: 1.5 RATIO — HIGH (ref 0.88–1.16)
INR BLD: 1.58 RATIO — HIGH (ref 0.88–1.16)
INR BLD: 1.58 RATIO — HIGH (ref 0.88–1.16)
INR BLD: 1.59 RATIO — HIGH (ref 0.88–1.16)
INR BLD: 1.6 RATIO — HIGH (ref 0.88–1.16)
INR BLD: 1.73 RATIO — HIGH (ref 0.88–1.16)
INR BLD: 1.79 RATIO — HIGH (ref 0.88–1.16)
INR BLD: 1.79 RATIO — HIGH (ref 0.88–1.16)
INR BLD: 1.8 RATIO — HIGH (ref 0.88–1.16)
INR BLD: 1.94 RATIO — HIGH (ref 0.88–1.16)
INR BLD: 1.95 RATIO — HIGH (ref 0.88–1.16)
INR BLD: 1.96 RATIO — HIGH (ref 0.88–1.16)
INR BLD: 1.96 RATIO — HIGH (ref 0.88–1.16)
INR BLD: 2.08 RATIO — HIGH (ref 0.88–1.16)
INR BLD: 2.23 RATIO — HIGH (ref 0.88–1.16)
INR BLD: 2.27 RATIO — HIGH (ref 0.88–1.16)
INR BLD: 2.32 RATIO — HIGH (ref 0.88–1.16)
INR BLD: 2.41 RATIO — HIGH (ref 0.88–1.16)
INR BLD: 2.47 RATIO — HIGH (ref 0.88–1.16)
INR BLD: 2.64 RATIO — HIGH (ref 0.88–1.16)
INR BLD: 2.67 RATIO — HIGH (ref 0.88–1.16)
INR BLD: 2.83 RATIO — HIGH (ref 0.88–1.16)
INR BLD: 2.84 RATIO — HIGH (ref 0.88–1.16)
INR BLD: 3.32 RATIO — HIGH (ref 0.88–1.16)
INR BLD: 8.84 RATIO — CRITICAL HIGH (ref 0.88–1.16)
INTERPRETATION SERPL IFE-IMP: SIGNIFICANT CHANGE UP
IRON SATN MFR SERPL: 13 % — LOW (ref 16–55)
IRON SATN MFR SERPL: 27 UG/DL — LOW (ref 45–165)
KAPPA LC SER QL IFE: 4.78 MG/DL — HIGH (ref 0.33–1.94)
KAPPA/LAMBDA FREE LIGHT CHAIN RATIO, SERUM: 1.33 RATIO — SIGNIFICANT CHANGE UP (ref 0.26–1.65)
KETONES UR-MCNC: ABNORMAL
KETONES UR-MCNC: NEGATIVE — SIGNIFICANT CHANGE UP
KETONES UR-MCNC: SIGNIFICANT CHANGE UP
LACTATE BLDV-MCNC: 0.7 MMOL/L — SIGNIFICANT CHANGE UP (ref 0.7–2)
LACTATE BLDV-MCNC: 0.9 MMOL/L — SIGNIFICANT CHANGE UP (ref 0.7–2)
LACTATE BLDV-MCNC: 0.9 MMOL/L — SIGNIFICANT CHANGE UP (ref 0.7–2)
LACTATE BLDV-MCNC: 1.1 MMOL/L — SIGNIFICANT CHANGE UP (ref 0.7–2)
LACTATE BLDV-MCNC: 1.2 MMOL/L — SIGNIFICANT CHANGE UP (ref 0.7–2)
LACTATE BLDV-MCNC: 1.3 MMOL/L — SIGNIFICANT CHANGE UP (ref 0.7–2)
LACTATE BLDV-MCNC: 1.4 MMOL/L — SIGNIFICANT CHANGE UP (ref 0.7–2)
LACTATE BLDV-MCNC: 1.5 MMOL/L — SIGNIFICANT CHANGE UP (ref 0.7–2)
LACTATE BLDV-MCNC: 2 MMOL/L — SIGNIFICANT CHANGE UP (ref 0.7–2)
LACTATE BLDV-MCNC: 2.1 MMOL/L — HIGH (ref 0.7–2)
LACTATE BLDV-MCNC: 5 MMOL/L — CRITICAL HIGH (ref 0.7–2)
LACTATE SERPL-SCNC: 1.3 MMOL/L — SIGNIFICANT CHANGE UP (ref 0.7–2)
LAMBDA LC SER QL IFE: 3.6 MG/DL — HIGH (ref 0.57–2.63)
LDH SERPL L TO P-CCNC: 167 U/L — SIGNIFICANT CHANGE UP (ref 50–242)
LDH SERPL L TO P-CCNC: 177 U/L — SIGNIFICANT CHANGE UP (ref 50–242)
LDH SERPL L TO P-CCNC: 180 U/L — SIGNIFICANT CHANGE UP (ref 50–242)
LDH SERPL L TO P-CCNC: 180 U/L — SIGNIFICANT CHANGE UP (ref 50–242)
LDH SERPL L TO P-CCNC: 181 U/L — SIGNIFICANT CHANGE UP (ref 50–242)
LDH SERPL L TO P-CCNC: 181 U/L — SIGNIFICANT CHANGE UP (ref 50–242)
LDH SERPL L TO P-CCNC: 183 U/L — SIGNIFICANT CHANGE UP (ref 50–242)
LDH SERPL L TO P-CCNC: 184 U/L — SIGNIFICANT CHANGE UP (ref 50–242)
LDH SERPL L TO P-CCNC: 185 U/L — SIGNIFICANT CHANGE UP (ref 50–242)
LDH SERPL L TO P-CCNC: 187 U/L — SIGNIFICANT CHANGE UP (ref 50–242)
LDH SERPL L TO P-CCNC: 189 U/L — SIGNIFICANT CHANGE UP (ref 50–242)
LDH SERPL L TO P-CCNC: 190 U/L — SIGNIFICANT CHANGE UP (ref 50–242)
LDH SERPL L TO P-CCNC: 192 U/L — SIGNIFICANT CHANGE UP (ref 50–242)
LDH SERPL L TO P-CCNC: 193 U/L — SIGNIFICANT CHANGE UP (ref 50–242)
LDH SERPL L TO P-CCNC: 195 U/L — SIGNIFICANT CHANGE UP (ref 50–242)
LDH SERPL L TO P-CCNC: 196 U/L — SIGNIFICANT CHANGE UP (ref 50–242)
LDH SERPL L TO P-CCNC: 197 U/L — SIGNIFICANT CHANGE UP (ref 50–242)
LDH SERPL L TO P-CCNC: 200 U/L — SIGNIFICANT CHANGE UP (ref 50–242)
LDH SERPL L TO P-CCNC: 201 U/L — SIGNIFICANT CHANGE UP (ref 50–242)
LDH SERPL L TO P-CCNC: 202 U/L — SIGNIFICANT CHANGE UP (ref 50–242)
LDH SERPL L TO P-CCNC: 203 U/L — SIGNIFICANT CHANGE UP (ref 50–242)
LDH SERPL L TO P-CCNC: 204 U/L — SIGNIFICANT CHANGE UP (ref 50–242)
LDH SERPL L TO P-CCNC: 205 U/L — SIGNIFICANT CHANGE UP (ref 50–242)
LDH SERPL L TO P-CCNC: 207 U/L — SIGNIFICANT CHANGE UP (ref 50–242)
LDH SERPL L TO P-CCNC: 209 U/L — SIGNIFICANT CHANGE UP (ref 50–242)
LDH SERPL L TO P-CCNC: 210 U/L — SIGNIFICANT CHANGE UP (ref 50–242)
LDH SERPL L TO P-CCNC: 210 U/L — SIGNIFICANT CHANGE UP (ref 50–242)
LDH SERPL L TO P-CCNC: 211 U/L — SIGNIFICANT CHANGE UP (ref 50–242)
LDH SERPL L TO P-CCNC: 211 U/L — SIGNIFICANT CHANGE UP (ref 50–242)
LDH SERPL L TO P-CCNC: 213 U/L — SIGNIFICANT CHANGE UP (ref 50–242)
LDH SERPL L TO P-CCNC: 214 U/L — SIGNIFICANT CHANGE UP (ref 50–242)
LDH SERPL L TO P-CCNC: 214 U/L — SIGNIFICANT CHANGE UP (ref 50–242)
LDH SERPL L TO P-CCNC: 216 U/L — SIGNIFICANT CHANGE UP (ref 50–242)
LDH SERPL L TO P-CCNC: 217 U/L — SIGNIFICANT CHANGE UP (ref 50–242)
LDH SERPL L TO P-CCNC: 217 U/L — SIGNIFICANT CHANGE UP (ref 50–242)
LDH SERPL L TO P-CCNC: 218 U/L — SIGNIFICANT CHANGE UP (ref 50–242)
LDH SERPL L TO P-CCNC: 219 U/L — SIGNIFICANT CHANGE UP (ref 50–242)
LDH SERPL L TO P-CCNC: 219 U/L — SIGNIFICANT CHANGE UP (ref 50–242)
LDH SERPL L TO P-CCNC: 220 U/L — SIGNIFICANT CHANGE UP (ref 50–242)
LDH SERPL L TO P-CCNC: 221 U/L — SIGNIFICANT CHANGE UP (ref 50–242)
LDH SERPL L TO P-CCNC: 225 U/L — SIGNIFICANT CHANGE UP (ref 50–242)
LDH SERPL L TO P-CCNC: 225 U/L — SIGNIFICANT CHANGE UP (ref 50–242)
LDH SERPL L TO P-CCNC: 227 U/L — SIGNIFICANT CHANGE UP (ref 50–242)
LDH SERPL L TO P-CCNC: 227 U/L — SIGNIFICANT CHANGE UP (ref 50–242)
LDH SERPL L TO P-CCNC: 228 U/L — SIGNIFICANT CHANGE UP (ref 50–242)
LDH SERPL L TO P-CCNC: 229 U/L — SIGNIFICANT CHANGE UP (ref 50–242)
LDH SERPL L TO P-CCNC: 231 U/L — SIGNIFICANT CHANGE UP (ref 50–242)
LDH SERPL L TO P-CCNC: 232 U/L — SIGNIFICANT CHANGE UP (ref 50–242)
LDH SERPL L TO P-CCNC: 232 U/L — SIGNIFICANT CHANGE UP (ref 50–242)
LDH SERPL L TO P-CCNC: 233 U/L — SIGNIFICANT CHANGE UP (ref 50–242)
LDH SERPL L TO P-CCNC: 234 U/L — SIGNIFICANT CHANGE UP (ref 50–242)
LDH SERPL L TO P-CCNC: 234 U/L — SIGNIFICANT CHANGE UP (ref 50–242)
LDH SERPL L TO P-CCNC: 236 U/L — SIGNIFICANT CHANGE UP (ref 50–242)
LDH SERPL L TO P-CCNC: 236 U/L — SIGNIFICANT CHANGE UP (ref 50–242)
LDH SERPL L TO P-CCNC: 237 U/L — SIGNIFICANT CHANGE UP (ref 50–242)
LDH SERPL L TO P-CCNC: 238 U/L — SIGNIFICANT CHANGE UP (ref 50–242)
LDH SERPL L TO P-CCNC: 240 U/L — SIGNIFICANT CHANGE UP (ref 50–242)
LDH SERPL L TO P-CCNC: 240 U/L — SIGNIFICANT CHANGE UP (ref 50–242)
LDH SERPL L TO P-CCNC: 241 U/L — SIGNIFICANT CHANGE UP (ref 50–242)
LDH SERPL L TO P-CCNC: 242 U/L — SIGNIFICANT CHANGE UP (ref 50–242)
LDH SERPL L TO P-CCNC: 242 U/L — SIGNIFICANT CHANGE UP (ref 50–242)
LDH SERPL L TO P-CCNC: 243 U/L — HIGH (ref 50–242)
LDH SERPL L TO P-CCNC: 246 U/L — HIGH (ref 50–242)
LDH SERPL L TO P-CCNC: 246 U/L — HIGH (ref 50–242)
LDH SERPL L TO P-CCNC: 249 U/L — HIGH (ref 50–242)
LDH SERPL L TO P-CCNC: 250 U/L — HIGH (ref 50–242)
LDH SERPL L TO P-CCNC: 250 U/L — HIGH (ref 50–242)
LDH SERPL L TO P-CCNC: 251 U/L — HIGH (ref 50–242)
LDH SERPL L TO P-CCNC: 251 U/L — HIGH (ref 50–242)
LDH SERPL L TO P-CCNC: 253 U/L — HIGH (ref 50–242)
LDH SERPL L TO P-CCNC: 253 U/L — HIGH (ref 50–242)
LDH SERPL L TO P-CCNC: 254 U/L — HIGH (ref 50–242)
LDH SERPL L TO P-CCNC: 254 U/L — HIGH (ref 50–242)
LDH SERPL L TO P-CCNC: 255 U/L — HIGH (ref 50–242)
LDH SERPL L TO P-CCNC: 256 U/L — HIGH (ref 50–242)
LDH SERPL L TO P-CCNC: 257 U/L — HIGH (ref 50–242)
LDH SERPL L TO P-CCNC: 259 U/L — HIGH (ref 50–242)
LDH SERPL L TO P-CCNC: 259 U/L — HIGH (ref 50–242)
LDH SERPL L TO P-CCNC: 260 U/L — HIGH (ref 50–242)
LDH SERPL L TO P-CCNC: 262 U/L — HIGH (ref 50–242)
LDH SERPL L TO P-CCNC: 263 U/L — HIGH (ref 50–242)
LDH SERPL L TO P-CCNC: 263 U/L — HIGH (ref 50–242)
LDH SERPL L TO P-CCNC: 268 U/L — HIGH (ref 50–242)
LDH SERPL L TO P-CCNC: 272 U/L — HIGH (ref 50–242)
LDH SERPL L TO P-CCNC: 273 U/L — HIGH (ref 50–242)
LDH SERPL L TO P-CCNC: 274 U/L — HIGH (ref 50–242)
LDH SERPL L TO P-CCNC: 275 U/L — HIGH (ref 50–242)
LDH SERPL L TO P-CCNC: 276 U/L — HIGH (ref 50–242)
LDH SERPL L TO P-CCNC: 277 U/L — HIGH (ref 50–242)
LDH SERPL L TO P-CCNC: 279 U/L — HIGH (ref 50–242)
LDH SERPL L TO P-CCNC: 281 U/L — HIGH (ref 50–242)
LDH SERPL L TO P-CCNC: 283 U/L — HIGH (ref 50–242)
LDH SERPL L TO P-CCNC: 284 U/L — HIGH (ref 50–242)
LDH SERPL L TO P-CCNC: 285 U/L — HIGH (ref 50–242)
LDH SERPL L TO P-CCNC: 288 U/L — HIGH (ref 50–242)
LDH SERPL L TO P-CCNC: 289 U/L — HIGH (ref 50–242)
LDH SERPL L TO P-CCNC: 289 U/L — HIGH (ref 50–242)
LDH SERPL L TO P-CCNC: 292 U/L — HIGH (ref 50–242)
LDH SERPL L TO P-CCNC: 294 U/L — HIGH (ref 50–242)
LDH SERPL L TO P-CCNC: 295 U/L — HIGH (ref 50–242)
LDH SERPL L TO P-CCNC: 296 U/L — HIGH (ref 50–242)
LDH SERPL L TO P-CCNC: 297 U/L — HIGH (ref 50–242)
LDH SERPL L TO P-CCNC: 298 U/L — HIGH (ref 50–242)
LDH SERPL L TO P-CCNC: 300 U/L — HIGH (ref 50–242)
LDH SERPL L TO P-CCNC: 304 U/L — HIGH (ref 50–242)
LDH SERPL L TO P-CCNC: 307 U/L — HIGH (ref 50–242)
LDH SERPL L TO P-CCNC: 308 U/L — HIGH (ref 50–242)
LDH SERPL L TO P-CCNC: 311 U/L — HIGH (ref 50–242)
LDH SERPL L TO P-CCNC: 311 U/L — HIGH (ref 50–242)
LDH SERPL L TO P-CCNC: 314 U/L — HIGH (ref 50–242)
LDH SERPL L TO P-CCNC: 315 U/L — HIGH (ref 50–242)
LDH SERPL L TO P-CCNC: 316 U/L — HIGH (ref 50–242)
LDH SERPL L TO P-CCNC: 317 U/L — HIGH (ref 50–242)
LDH SERPL L TO P-CCNC: 318 U/L — HIGH (ref 50–242)
LDH SERPL L TO P-CCNC: 320 U/L — HIGH (ref 50–242)
LDH SERPL L TO P-CCNC: 323 U/L — HIGH (ref 50–242)
LDH SERPL L TO P-CCNC: 326 U/L — HIGH (ref 50–242)
LDH SERPL L TO P-CCNC: 329 U/L — HIGH (ref 50–242)
LDH SERPL L TO P-CCNC: 331 U/L — HIGH (ref 50–242)
LDH SERPL L TO P-CCNC: 331 U/L — HIGH (ref 50–242)
LDH SERPL L TO P-CCNC: 332 U/L — HIGH (ref 50–242)
LDH SERPL L TO P-CCNC: 334 U/L — HIGH (ref 50–242)
LDH SERPL L TO P-CCNC: 338 U/L — HIGH (ref 50–242)
LDH SERPL L TO P-CCNC: 339 U/L — HIGH (ref 50–242)
LDH SERPL L TO P-CCNC: 347 U/L — HIGH (ref 50–242)
LDH SERPL L TO P-CCNC: 349 U/L — HIGH (ref 50–242)
LDH SERPL L TO P-CCNC: 350 U/L — HIGH (ref 50–242)
LDH SERPL L TO P-CCNC: 351 U/L — HIGH (ref 50–242)
LDH SERPL L TO P-CCNC: 364 U/L — HIGH (ref 50–242)
LDH SERPL L TO P-CCNC: 366 U/L — HIGH (ref 50–242)
LDH SERPL L TO P-CCNC: 372 U/L — HIGH (ref 50–242)
LDH SERPL L TO P-CCNC: 372 U/L — HIGH (ref 50–242)
LDH SERPL L TO P-CCNC: 373 U/L — HIGH (ref 50–242)
LDH SERPL L TO P-CCNC: 394 U/L — HIGH (ref 50–242)
LDH SERPL L TO P-CCNC: 400 U/L — HIGH (ref 50–242)
LDH SERPL L TO P-CCNC: 402 U/L — HIGH (ref 50–242)
LDH SERPL L TO P-CCNC: 407 U/L — HIGH (ref 50–242)
LDH SERPL L TO P-CCNC: 430 U/L — HIGH (ref 50–242)
LDH SERPL L TO P-CCNC: 439 U/L — HIGH (ref 50–242)
LDH SERPL L TO P-CCNC: 484 U/L — HIGH (ref 50–242)
LDH SERPL L TO P-CCNC: 835 U/L — HIGH (ref 50–242)
LEUKOCYTE ESTERASE UR-ACNC: ABNORMAL
LEUKOCYTE ESTERASE UR-ACNC: ABNORMAL
LEUKOCYTE ESTERASE UR-ACNC: NEGATIVE — SIGNIFICANT CHANGE UP
LIDOCAIN IGE QN: 19 U/L — SIGNIFICANT CHANGE UP (ref 7–60)
LIDOCAIN IGE QN: 25 U/L — SIGNIFICANT CHANGE UP (ref 7–60)
LIDOCAIN IGE QN: 33 U/L — SIGNIFICANT CHANGE UP (ref 7–60)
LIDOCAIN IGE QN: 81 U/L — HIGH (ref 7–60)
LIPID PNL WITH DIRECT LDL SERPL: 91 MG/DL — SIGNIFICANT CHANGE UP
LYMPHOCYTES # BLD AUTO: 0.43 K/UL — LOW (ref 1–3.3)
LYMPHOCYTES # BLD AUTO: 0.83 K/UL — LOW (ref 1–3.3)
LYMPHOCYTES # BLD AUTO: 0.87 K/UL — LOW (ref 1–3.3)
LYMPHOCYTES # BLD AUTO: 0.97 K/UL — LOW (ref 1–3.3)
LYMPHOCYTES # BLD AUTO: 1.09 K/UL — SIGNIFICANT CHANGE UP (ref 1–3.3)
LYMPHOCYTES # BLD AUTO: 1.21 K/UL — SIGNIFICANT CHANGE UP (ref 1–3.3)
LYMPHOCYTES # BLD AUTO: 1.21 K/UL — SIGNIFICANT CHANGE UP (ref 1–3.3)
LYMPHOCYTES # BLD AUTO: 1.33 K/UL — SIGNIFICANT CHANGE UP (ref 1–3.3)
LYMPHOCYTES # BLD AUTO: 1.86 K/UL — SIGNIFICANT CHANGE UP (ref 1–3.3)
LYMPHOCYTES # BLD AUTO: 10.7 % — LOW (ref 13–44)
LYMPHOCYTES # BLD AUTO: 14.2 % — SIGNIFICANT CHANGE UP (ref 13–44)
LYMPHOCYTES # BLD AUTO: 20.4 % — SIGNIFICANT CHANGE UP (ref 13–44)
LYMPHOCYTES # BLD AUTO: 22.3 % — SIGNIFICANT CHANGE UP (ref 13–44)
LYMPHOCYTES # BLD AUTO: 3.7 % — LOW (ref 13–44)
LYMPHOCYTES # BLD AUTO: 5.3 % — LOW (ref 13–44)
LYMPHOCYTES # BLD AUTO: 8.5 % — LOW (ref 13–44)
LYMPHOCYTES # BLD AUTO: 9.2 % — LOW (ref 13–44)
LYMPHOCYTES # BLD AUTO: 9.2 % — LOW (ref 13–44)
M TB IFN-G BLD-IMP: ABNORMAL
M TB IFN-G CD4+ BCKGRND COR BLD-ACNC: 0 IU/ML — SIGNIFICANT CHANGE UP
M TB IFN-G CD4+CD8+ BCKGRND COR BLD-ACNC: 0 IU/ML — SIGNIFICANT CHANGE UP
MACROCYTES BLD QL: SLIGHT — SIGNIFICANT CHANGE UP
MAGNESIUM SERPL-MCNC: 1.3 MG/DL — LOW (ref 1.6–2.6)
MAGNESIUM SERPL-MCNC: 1.4 MG/DL — LOW (ref 1.6–2.6)
MAGNESIUM SERPL-MCNC: 1.5 MG/DL — LOW (ref 1.6–2.6)
MAGNESIUM SERPL-MCNC: 1.6 MG/DL — SIGNIFICANT CHANGE UP (ref 1.6–2.6)
MAGNESIUM SERPL-MCNC: 1.7 MG/DL — SIGNIFICANT CHANGE UP (ref 1.6–2.6)
MAGNESIUM SERPL-MCNC: 1.8 MG/DL — SIGNIFICANT CHANGE UP (ref 1.6–2.6)
MAGNESIUM SERPL-MCNC: 1.9 MG/DL — SIGNIFICANT CHANGE UP (ref 1.6–2.6)
MAGNESIUM SERPL-MCNC: 2 MG/DL — SIGNIFICANT CHANGE UP (ref 1.6–2.6)
MAGNESIUM SERPL-MCNC: 2.1 MG/DL — SIGNIFICANT CHANGE UP (ref 1.6–2.6)
MAGNESIUM SERPL-MCNC: 2.2 MG/DL — SIGNIFICANT CHANGE UP (ref 1.6–2.6)
MAGNESIUM SERPL-MCNC: 2.4 MG/DL — SIGNIFICANT CHANGE UP (ref 1.6–2.6)
MAGNESIUM SERPL-MCNC: 2.5 MG/DL — SIGNIFICANT CHANGE UP (ref 1.6–2.6)
MAGNESIUM SERPL-MCNC: 2.8 MG/DL — HIGH (ref 1.6–2.6)
MANUAL SMEAR VERIFICATION: SIGNIFICANT CHANGE UP
MCHC RBC-ENTMCNC: 21.2 PG — LOW (ref 27–34)
MCHC RBC-ENTMCNC: 25.6 PG — LOW (ref 27–34)
MCHC RBC-ENTMCNC: 25.8 PG — LOW (ref 27–34)
MCHC RBC-ENTMCNC: 25.9 PG — LOW (ref 27–34)
MCHC RBC-ENTMCNC: 26 PG — LOW (ref 27–34)
MCHC RBC-ENTMCNC: 26.1 PG — LOW (ref 27–34)
MCHC RBC-ENTMCNC: 26.2 PG — LOW (ref 27–34)
MCHC RBC-ENTMCNC: 26.3 PG — LOW (ref 27–34)
MCHC RBC-ENTMCNC: 26.4 PG — LOW (ref 27–34)
MCHC RBC-ENTMCNC: 26.5 PG — LOW (ref 27–34)
MCHC RBC-ENTMCNC: 26.5 PG — LOW (ref 27–34)
MCHC RBC-ENTMCNC: 26.6 PG — LOW (ref 27–34)
MCHC RBC-ENTMCNC: 26.6 PG — LOW (ref 27–34)
MCHC RBC-ENTMCNC: 26.7 PG — LOW (ref 27–34)
MCHC RBC-ENTMCNC: 26.8 PG — LOW (ref 27–34)
MCHC RBC-ENTMCNC: 26.9 PG — LOW (ref 27–34)
MCHC RBC-ENTMCNC: 27 PG — SIGNIFICANT CHANGE UP (ref 27–34)
MCHC RBC-ENTMCNC: 27.1 PG — SIGNIFICANT CHANGE UP (ref 27–34)
MCHC RBC-ENTMCNC: 27.1 PG — SIGNIFICANT CHANGE UP (ref 27–34)
MCHC RBC-ENTMCNC: 27.2 PG — SIGNIFICANT CHANGE UP (ref 27–34)
MCHC RBC-ENTMCNC: 27.3 PG — SIGNIFICANT CHANGE UP (ref 27–34)
MCHC RBC-ENTMCNC: 27.4 PG — SIGNIFICANT CHANGE UP (ref 27–34)
MCHC RBC-ENTMCNC: 27.5 PG — SIGNIFICANT CHANGE UP (ref 27–34)
MCHC RBC-ENTMCNC: 27.6 GM/DL — LOW (ref 32–36)
MCHC RBC-ENTMCNC: 27.6 PG — SIGNIFICANT CHANGE UP (ref 27–34)
MCHC RBC-ENTMCNC: 27.7 PG — SIGNIFICANT CHANGE UP (ref 27–34)
MCHC RBC-ENTMCNC: 27.8 PG — SIGNIFICANT CHANGE UP (ref 27–34)
MCHC RBC-ENTMCNC: 27.9 PG — SIGNIFICANT CHANGE UP (ref 27–34)
MCHC RBC-ENTMCNC: 28 PG — SIGNIFICANT CHANGE UP (ref 27–34)
MCHC RBC-ENTMCNC: 28.1 PG — SIGNIFICANT CHANGE UP (ref 27–34)
MCHC RBC-ENTMCNC: 28.2 PG — SIGNIFICANT CHANGE UP (ref 27–34)
MCHC RBC-ENTMCNC: 28.3 PG — SIGNIFICANT CHANGE UP (ref 27–34)
MCHC RBC-ENTMCNC: 28.4 PG — SIGNIFICANT CHANGE UP (ref 27–34)
MCHC RBC-ENTMCNC: 28.5 PG — SIGNIFICANT CHANGE UP (ref 27–34)
MCHC RBC-ENTMCNC: 28.6 PG — SIGNIFICANT CHANGE UP (ref 27–34)
MCHC RBC-ENTMCNC: 28.6 PG — SIGNIFICANT CHANGE UP (ref 27–34)
MCHC RBC-ENTMCNC: 28.7 PG — SIGNIFICANT CHANGE UP (ref 27–34)
MCHC RBC-ENTMCNC: 28.8 PG — SIGNIFICANT CHANGE UP (ref 27–34)
MCHC RBC-ENTMCNC: 28.9 PG — SIGNIFICANT CHANGE UP (ref 27–34)
MCHC RBC-ENTMCNC: 29 PG — SIGNIFICANT CHANGE UP (ref 27–34)
MCHC RBC-ENTMCNC: 29.1 GM/DL — LOW (ref 32–36)
MCHC RBC-ENTMCNC: 29.1 PG — SIGNIFICANT CHANGE UP (ref 27–34)
MCHC RBC-ENTMCNC: 29.2 PG — SIGNIFICANT CHANGE UP (ref 27–34)
MCHC RBC-ENTMCNC: 29.3 GM/DL — LOW (ref 32–36)
MCHC RBC-ENTMCNC: 29.3 PG — SIGNIFICANT CHANGE UP (ref 27–34)
MCHC RBC-ENTMCNC: 29.4 GM/DL — LOW (ref 32–36)
MCHC RBC-ENTMCNC: 29.4 GM/DL — LOW (ref 32–36)
MCHC RBC-ENTMCNC: 29.4 PG — SIGNIFICANT CHANGE UP (ref 27–34)
MCHC RBC-ENTMCNC: 29.4 PG — SIGNIFICANT CHANGE UP (ref 27–34)
MCHC RBC-ENTMCNC: 29.5 PG — SIGNIFICANT CHANGE UP (ref 27–34)
MCHC RBC-ENTMCNC: 29.6 GM/DL — LOW (ref 32–36)
MCHC RBC-ENTMCNC: 29.6 GM/DL — LOW (ref 32–36)
MCHC RBC-ENTMCNC: 29.6 PG — SIGNIFICANT CHANGE UP (ref 27–34)
MCHC RBC-ENTMCNC: 29.7 GM/DL — LOW (ref 32–36)
MCHC RBC-ENTMCNC: 29.7 PG — SIGNIFICANT CHANGE UP (ref 27–34)
MCHC RBC-ENTMCNC: 29.8 GM/DL — LOW (ref 32–36)
MCHC RBC-ENTMCNC: 29.8 GM/DL — LOW (ref 32–36)
MCHC RBC-ENTMCNC: 29.8 PG — SIGNIFICANT CHANGE UP (ref 27–34)
MCHC RBC-ENTMCNC: 29.8 PG — SIGNIFICANT CHANGE UP (ref 27–34)
MCHC RBC-ENTMCNC: 29.9 GM/DL — LOW (ref 32–36)
MCHC RBC-ENTMCNC: 29.9 PG — SIGNIFICANT CHANGE UP (ref 27–34)
MCHC RBC-ENTMCNC: 30 GM/DL — LOW (ref 32–36)
MCHC RBC-ENTMCNC: 30 GM/DL — LOW (ref 32–36)
MCHC RBC-ENTMCNC: 30 PG — SIGNIFICANT CHANGE UP (ref 27–34)
MCHC RBC-ENTMCNC: 30.1 GM/DL — LOW (ref 32–36)
MCHC RBC-ENTMCNC: 30.1 PG — SIGNIFICANT CHANGE UP (ref 27–34)
MCHC RBC-ENTMCNC: 30.2 GM/DL — LOW (ref 32–36)
MCHC RBC-ENTMCNC: 30.2 PG — SIGNIFICANT CHANGE UP (ref 27–34)
MCHC RBC-ENTMCNC: 30.3 GM/DL — LOW (ref 32–36)
MCHC RBC-ENTMCNC: 30.3 PG — SIGNIFICANT CHANGE UP (ref 27–34)
MCHC RBC-ENTMCNC: 30.3 PG — SIGNIFICANT CHANGE UP (ref 27–34)
MCHC RBC-ENTMCNC: 30.4 GM/DL — LOW (ref 32–36)
MCHC RBC-ENTMCNC: 30.4 PG — SIGNIFICANT CHANGE UP (ref 27–34)
MCHC RBC-ENTMCNC: 30.5 GM/DL — LOW (ref 32–36)
MCHC RBC-ENTMCNC: 30.5 PG — SIGNIFICANT CHANGE UP (ref 27–34)
MCHC RBC-ENTMCNC: 30.5 PG — SIGNIFICANT CHANGE UP (ref 27–34)
MCHC RBC-ENTMCNC: 30.6 GM/DL — LOW (ref 32–36)
MCHC RBC-ENTMCNC: 30.6 PG — SIGNIFICANT CHANGE UP (ref 27–34)
MCHC RBC-ENTMCNC: 30.6 PG — SIGNIFICANT CHANGE UP (ref 27–34)
MCHC RBC-ENTMCNC: 30.7 GM/DL — LOW (ref 32–36)
MCHC RBC-ENTMCNC: 30.7 PG — SIGNIFICANT CHANGE UP (ref 27–34)
MCHC RBC-ENTMCNC: 30.8 GM/DL — LOW (ref 32–36)
MCHC RBC-ENTMCNC: 30.8 PG — SIGNIFICANT CHANGE UP (ref 27–34)
MCHC RBC-ENTMCNC: 30.9 GM/DL — LOW (ref 32–36)
MCHC RBC-ENTMCNC: 31 GM/DL — LOW (ref 32–36)
MCHC RBC-ENTMCNC: 31 PG — SIGNIFICANT CHANGE UP (ref 27–34)
MCHC RBC-ENTMCNC: 31.1 GM/DL — LOW (ref 32–36)
MCHC RBC-ENTMCNC: 31.2 GM/DL — LOW (ref 32–36)
MCHC RBC-ENTMCNC: 31.3 GM/DL — LOW (ref 32–36)
MCHC RBC-ENTMCNC: 31.4 GM/DL — LOW (ref 32–36)
MCHC RBC-ENTMCNC: 31.5 GM/DL — LOW (ref 32–36)
MCHC RBC-ENTMCNC: 31.6 GM/DL — LOW (ref 32–36)
MCHC RBC-ENTMCNC: 31.7 GM/DL — LOW (ref 32–36)
MCHC RBC-ENTMCNC: 31.8 GM/DL — LOW (ref 32–36)
MCHC RBC-ENTMCNC: 31.9 GM/DL — LOW (ref 32–36)
MCHC RBC-ENTMCNC: 32 GM/DL — SIGNIFICANT CHANGE UP (ref 32–36)
MCHC RBC-ENTMCNC: 32.1 GM/DL — SIGNIFICANT CHANGE UP (ref 32–36)
MCHC RBC-ENTMCNC: 32.2 GM/DL — SIGNIFICANT CHANGE UP (ref 32–36)
MCHC RBC-ENTMCNC: 32.2 GM/DL — SIGNIFICANT CHANGE UP (ref 32–36)
MCHC RBC-ENTMCNC: 32.3 GM/DL — SIGNIFICANT CHANGE UP (ref 32–36)
MCHC RBC-ENTMCNC: 32.4 GM/DL — SIGNIFICANT CHANGE UP (ref 32–36)
MCHC RBC-ENTMCNC: 32.5 GM/DL — SIGNIFICANT CHANGE UP (ref 32–36)
MCHC RBC-ENTMCNC: 32.6 GM/DL — SIGNIFICANT CHANGE UP (ref 32–36)
MCHC RBC-ENTMCNC: 32.7 GM/DL — SIGNIFICANT CHANGE UP (ref 32–36)
MCHC RBC-ENTMCNC: 32.8 GM/DL — SIGNIFICANT CHANGE UP (ref 32–36)
MCHC RBC-ENTMCNC: 32.8 GM/DL — SIGNIFICANT CHANGE UP (ref 32–36)
MCHC RBC-ENTMCNC: 32.9 GM/DL — SIGNIFICANT CHANGE UP (ref 32–36)
MCHC RBC-ENTMCNC: 33 GM/DL — SIGNIFICANT CHANGE UP (ref 32–36)
MCHC RBC-ENTMCNC: 33 GM/DL — SIGNIFICANT CHANGE UP (ref 32–36)
MCHC RBC-ENTMCNC: 33.1 GM/DL — SIGNIFICANT CHANGE UP (ref 32–36)
MCHC RBC-ENTMCNC: 33.2 GM/DL — SIGNIFICANT CHANGE UP (ref 32–36)
MCHC RBC-ENTMCNC: 33.3 GM/DL — SIGNIFICANT CHANGE UP (ref 32–36)
MCHC RBC-ENTMCNC: 33.3 GM/DL — SIGNIFICANT CHANGE UP (ref 32–36)
MCHC RBC-ENTMCNC: 33.4 GM/DL — SIGNIFICANT CHANGE UP (ref 32–36)
MCHC RBC-ENTMCNC: 33.4 GM/DL — SIGNIFICANT CHANGE UP (ref 32–36)
MCHC RBC-ENTMCNC: 33.5 GM/DL — SIGNIFICANT CHANGE UP (ref 32–36)
MCHC RBC-ENTMCNC: 33.9 GM/DL — SIGNIFICANT CHANGE UP (ref 32–36)
MCHC RBC-ENTMCNC: 33.9 GM/DL — SIGNIFICANT CHANGE UP (ref 32–36)
MCHC RBC-ENTMCNC: 34.2 GM/DL — SIGNIFICANT CHANGE UP (ref 32–36)
MCHC RBC-ENTMCNC: 34.3 GM/DL — SIGNIFICANT CHANGE UP (ref 32–36)
MCHC RBC-ENTMCNC: 34.5 GM/DL — SIGNIFICANT CHANGE UP (ref 32–36)
MCHC RBC-ENTMCNC: 34.6 GM/DL — SIGNIFICANT CHANGE UP (ref 32–36)
MCV RBC AUTO: 76.9 FL — LOW (ref 80–100)
MCV RBC AUTO: 82.5 FL — SIGNIFICANT CHANGE UP (ref 80–100)
MCV RBC AUTO: 82.5 FL — SIGNIFICANT CHANGE UP (ref 80–100)
MCV RBC AUTO: 82.7 FL — SIGNIFICANT CHANGE UP (ref 80–100)
MCV RBC AUTO: 82.8 FL — SIGNIFICANT CHANGE UP (ref 80–100)
MCV RBC AUTO: 83.1 FL — SIGNIFICANT CHANGE UP (ref 80–100)
MCV RBC AUTO: 83.1 FL — SIGNIFICANT CHANGE UP (ref 80–100)
MCV RBC AUTO: 83.2 FL — SIGNIFICANT CHANGE UP (ref 80–100)
MCV RBC AUTO: 83.4 FL — SIGNIFICANT CHANGE UP (ref 80–100)
MCV RBC AUTO: 84.1 FL — SIGNIFICANT CHANGE UP (ref 80–100)
MCV RBC AUTO: 84.1 FL — SIGNIFICANT CHANGE UP (ref 80–100)
MCV RBC AUTO: 84.2 FL — SIGNIFICANT CHANGE UP (ref 80–100)
MCV RBC AUTO: 84.2 FL — SIGNIFICANT CHANGE UP (ref 80–100)
MCV RBC AUTO: 84.4 FL — SIGNIFICANT CHANGE UP (ref 80–100)
MCV RBC AUTO: 84.5 FL — SIGNIFICANT CHANGE UP (ref 80–100)
MCV RBC AUTO: 84.6 FL — SIGNIFICANT CHANGE UP (ref 80–100)
MCV RBC AUTO: 84.6 FL — SIGNIFICANT CHANGE UP (ref 80–100)
MCV RBC AUTO: 84.7 FL — SIGNIFICANT CHANGE UP (ref 80–100)
MCV RBC AUTO: 84.8 FL — SIGNIFICANT CHANGE UP (ref 80–100)
MCV RBC AUTO: 84.9 FL — SIGNIFICANT CHANGE UP (ref 80–100)
MCV RBC AUTO: 84.9 FL — SIGNIFICANT CHANGE UP (ref 80–100)
MCV RBC AUTO: 85 FL — SIGNIFICANT CHANGE UP (ref 80–100)
MCV RBC AUTO: 85 FL — SIGNIFICANT CHANGE UP (ref 80–100)
MCV RBC AUTO: 85.2 FL — SIGNIFICANT CHANGE UP (ref 80–100)
MCV RBC AUTO: 85.2 FL — SIGNIFICANT CHANGE UP (ref 80–100)
MCV RBC AUTO: 85.3 FL — SIGNIFICANT CHANGE UP (ref 80–100)
MCV RBC AUTO: 85.4 FL — SIGNIFICANT CHANGE UP (ref 80–100)
MCV RBC AUTO: 85.4 FL — SIGNIFICANT CHANGE UP (ref 80–100)
MCV RBC AUTO: 85.6 FL — SIGNIFICANT CHANGE UP (ref 80–100)
MCV RBC AUTO: 85.7 FL — SIGNIFICANT CHANGE UP (ref 80–100)
MCV RBC AUTO: 85.9 FL — SIGNIFICANT CHANGE UP (ref 80–100)
MCV RBC AUTO: 86 FL — SIGNIFICANT CHANGE UP (ref 80–100)
MCV RBC AUTO: 86.1 FL — SIGNIFICANT CHANGE UP (ref 80–100)
MCV RBC AUTO: 86.2 FL — SIGNIFICANT CHANGE UP (ref 80–100)
MCV RBC AUTO: 86.3 FL — SIGNIFICANT CHANGE UP (ref 80–100)
MCV RBC AUTO: 86.3 FL — SIGNIFICANT CHANGE UP (ref 80–100)
MCV RBC AUTO: 86.5 FL — SIGNIFICANT CHANGE UP (ref 80–100)
MCV RBC AUTO: 86.6 FL — SIGNIFICANT CHANGE UP (ref 80–100)
MCV RBC AUTO: 86.6 FL — SIGNIFICANT CHANGE UP (ref 80–100)
MCV RBC AUTO: 86.8 FL — SIGNIFICANT CHANGE UP (ref 80–100)
MCV RBC AUTO: 86.9 FL — SIGNIFICANT CHANGE UP (ref 80–100)
MCV RBC AUTO: 87.3 FL — SIGNIFICANT CHANGE UP (ref 80–100)
MCV RBC AUTO: 87.3 FL — SIGNIFICANT CHANGE UP (ref 80–100)
MCV RBC AUTO: 87.4 FL — SIGNIFICANT CHANGE UP (ref 80–100)
MCV RBC AUTO: 87.4 FL — SIGNIFICANT CHANGE UP (ref 80–100)
MCV RBC AUTO: 87.5 FL — SIGNIFICANT CHANGE UP (ref 80–100)
MCV RBC AUTO: 87.7 FL — SIGNIFICANT CHANGE UP (ref 80–100)
MCV RBC AUTO: 87.8 FL — SIGNIFICANT CHANGE UP (ref 80–100)
MCV RBC AUTO: 87.8 FL — SIGNIFICANT CHANGE UP (ref 80–100)
MCV RBC AUTO: 87.9 FL — SIGNIFICANT CHANGE UP (ref 80–100)
MCV RBC AUTO: 87.9 FL — SIGNIFICANT CHANGE UP (ref 80–100)
MCV RBC AUTO: 88 FL — SIGNIFICANT CHANGE UP (ref 80–100)
MCV RBC AUTO: 88 FL — SIGNIFICANT CHANGE UP (ref 80–100)
MCV RBC AUTO: 88.2 FL — SIGNIFICANT CHANGE UP (ref 80–100)
MCV RBC AUTO: 88.3 FL — SIGNIFICANT CHANGE UP (ref 80–100)
MCV RBC AUTO: 88.4 FL — SIGNIFICANT CHANGE UP (ref 80–100)
MCV RBC AUTO: 88.4 FL — SIGNIFICANT CHANGE UP (ref 80–100)
MCV RBC AUTO: 88.5 FL — SIGNIFICANT CHANGE UP (ref 80–100)
MCV RBC AUTO: 88.6 FL — SIGNIFICANT CHANGE UP (ref 80–100)
MCV RBC AUTO: 88.6 FL — SIGNIFICANT CHANGE UP (ref 80–100)
MCV RBC AUTO: 88.8 FL — SIGNIFICANT CHANGE UP (ref 80–100)
MCV RBC AUTO: 89 FL — SIGNIFICANT CHANGE UP (ref 80–100)
MCV RBC AUTO: 89.1 FL — SIGNIFICANT CHANGE UP (ref 80–100)
MCV RBC AUTO: 89.1 FL — SIGNIFICANT CHANGE UP (ref 80–100)
MCV RBC AUTO: 89.2 FL — SIGNIFICANT CHANGE UP (ref 80–100)
MCV RBC AUTO: 89.3 FL — SIGNIFICANT CHANGE UP (ref 80–100)
MCV RBC AUTO: 89.4 FL — SIGNIFICANT CHANGE UP (ref 80–100)
MCV RBC AUTO: 89.5 FL — SIGNIFICANT CHANGE UP (ref 80–100)
MCV RBC AUTO: 89.5 FL — SIGNIFICANT CHANGE UP (ref 80–100)
MCV RBC AUTO: 89.6 FL — SIGNIFICANT CHANGE UP (ref 80–100)
MCV RBC AUTO: 89.7 FL — SIGNIFICANT CHANGE UP (ref 80–100)
MCV RBC AUTO: 89.7 FL — SIGNIFICANT CHANGE UP (ref 80–100)
MCV RBC AUTO: 89.8 FL — SIGNIFICANT CHANGE UP (ref 80–100)
MCV RBC AUTO: 89.9 FL — SIGNIFICANT CHANGE UP (ref 80–100)
MCV RBC AUTO: 90 FL — SIGNIFICANT CHANGE UP (ref 80–100)
MCV RBC AUTO: 90.1 FL — SIGNIFICANT CHANGE UP (ref 80–100)
MCV RBC AUTO: 90.2 FL — SIGNIFICANT CHANGE UP (ref 80–100)
MCV RBC AUTO: 90.3 FL — SIGNIFICANT CHANGE UP (ref 80–100)
MCV RBC AUTO: 90.4 FL — SIGNIFICANT CHANGE UP (ref 80–100)
MCV RBC AUTO: 90.5 FL — SIGNIFICANT CHANGE UP (ref 80–100)
MCV RBC AUTO: 90.5 FL — SIGNIFICANT CHANGE UP (ref 80–100)
MCV RBC AUTO: 90.6 FL — SIGNIFICANT CHANGE UP (ref 80–100)
MCV RBC AUTO: 90.7 FL — SIGNIFICANT CHANGE UP (ref 80–100)
MCV RBC AUTO: 90.8 FL — SIGNIFICANT CHANGE UP (ref 80–100)
MCV RBC AUTO: 90.9 FL — SIGNIFICANT CHANGE UP (ref 80–100)
MCV RBC AUTO: 91 FL — SIGNIFICANT CHANGE UP (ref 80–100)
MCV RBC AUTO: 91.1 FL — SIGNIFICANT CHANGE UP (ref 80–100)
MCV RBC AUTO: 91.2 FL — SIGNIFICANT CHANGE UP (ref 80–100)
MCV RBC AUTO: 91.3 FL — SIGNIFICANT CHANGE UP (ref 80–100)
MCV RBC AUTO: 91.4 FL — SIGNIFICANT CHANGE UP (ref 80–100)
MCV RBC AUTO: 91.4 FL — SIGNIFICANT CHANGE UP (ref 80–100)
MCV RBC AUTO: 91.5 FL — SIGNIFICANT CHANGE UP (ref 80–100)
MCV RBC AUTO: 91.6 FL — SIGNIFICANT CHANGE UP (ref 80–100)
MCV RBC AUTO: 91.6 FL — SIGNIFICANT CHANGE UP (ref 80–100)
MCV RBC AUTO: 91.7 FL — SIGNIFICANT CHANGE UP (ref 80–100)
MCV RBC AUTO: 91.8 FL — SIGNIFICANT CHANGE UP (ref 80–100)
MCV RBC AUTO: 91.9 FL — SIGNIFICANT CHANGE UP (ref 80–100)
MCV RBC AUTO: 91.9 FL — SIGNIFICANT CHANGE UP (ref 80–100)
MCV RBC AUTO: 92 FL — SIGNIFICANT CHANGE UP (ref 80–100)
MCV RBC AUTO: 92.2 FL — SIGNIFICANT CHANGE UP (ref 80–100)
MCV RBC AUTO: 92.3 FL — SIGNIFICANT CHANGE UP (ref 80–100)
MCV RBC AUTO: 92.4 FL — SIGNIFICANT CHANGE UP (ref 80–100)
MCV RBC AUTO: 92.5 FL — SIGNIFICANT CHANGE UP (ref 80–100)
MCV RBC AUTO: 92.7 FL — SIGNIFICANT CHANGE UP (ref 80–100)
MCV RBC AUTO: 92.8 FL — SIGNIFICANT CHANGE UP (ref 80–100)
MCV RBC AUTO: 92.9 FL — SIGNIFICANT CHANGE UP (ref 80–100)
MCV RBC AUTO: 92.9 FL — SIGNIFICANT CHANGE UP (ref 80–100)
MCV RBC AUTO: 93.1 FL — SIGNIFICANT CHANGE UP (ref 80–100)
MCV RBC AUTO: 93.2 FL — SIGNIFICANT CHANGE UP (ref 80–100)
MCV RBC AUTO: 93.3 FL — SIGNIFICANT CHANGE UP (ref 80–100)
MCV RBC AUTO: 93.5 FL — SIGNIFICANT CHANGE UP (ref 80–100)
MCV RBC AUTO: 93.6 FL — SIGNIFICANT CHANGE UP (ref 80–100)
MCV RBC AUTO: 94 FL — SIGNIFICANT CHANGE UP (ref 80–100)
MCV RBC AUTO: 94.1 FL — SIGNIFICANT CHANGE UP (ref 80–100)
MCV RBC AUTO: 94.1 FL — SIGNIFICANT CHANGE UP (ref 80–100)
MCV RBC AUTO: 94.3 FL — SIGNIFICANT CHANGE UP (ref 80–100)
MCV RBC AUTO: 94.6 FL — SIGNIFICANT CHANGE UP (ref 80–100)
MCV RBC AUTO: 94.7 FL — SIGNIFICANT CHANGE UP (ref 80–100)
MCV RBC AUTO: 94.8 FL — SIGNIFICANT CHANGE UP (ref 80–100)
MCV RBC AUTO: 94.9 FL — SIGNIFICANT CHANGE UP (ref 80–100)
MCV RBC AUTO: 94.9 FL — SIGNIFICANT CHANGE UP (ref 80–100)
MCV RBC AUTO: 95 FL — SIGNIFICANT CHANGE UP (ref 80–100)
MCV RBC AUTO: 95 FL — SIGNIFICANT CHANGE UP (ref 80–100)
MCV RBC AUTO: 95.1 FL — SIGNIFICANT CHANGE UP (ref 80–100)
MCV RBC AUTO: 95.2 FL — SIGNIFICANT CHANGE UP (ref 80–100)
MCV RBC AUTO: 95.2 FL — SIGNIFICANT CHANGE UP (ref 80–100)
MCV RBC AUTO: 95.7 FL — SIGNIFICANT CHANGE UP (ref 80–100)
MCV RBC AUTO: 95.8 FL — SIGNIFICANT CHANGE UP (ref 80–100)
MCV RBC AUTO: 96 FL — SIGNIFICANT CHANGE UP (ref 80–100)
MCV RBC AUTO: 96.4 FL — SIGNIFICANT CHANGE UP (ref 80–100)
MCV RBC AUTO: 96.7 FL — SIGNIFICANT CHANGE UP (ref 80–100)
MCV RBC AUTO: 96.9 FL — SIGNIFICANT CHANGE UP (ref 80–100)
MCV RBC AUTO: 97.2 FL — SIGNIFICANT CHANGE UP (ref 80–100)
MCV RBC AUTO: 97.4 FL — SIGNIFICANT CHANGE UP (ref 80–100)
MCV RBC AUTO: 97.6 FL — SIGNIFICANT CHANGE UP (ref 80–100)
MCV RBC AUTO: 98.8 FL — SIGNIFICANT CHANGE UP (ref 80–100)
MELD SCORE WITH DIALYSIS: 23 POINTS — SIGNIFICANT CHANGE UP
MELD SCORE WITHOUT DIALYSIS: 6 POINTS — SIGNIFICANT CHANGE UP
METHOD TYPE: SIGNIFICANT CHANGE UP
MICROCYTES BLD QL: SLIGHT — SIGNIFICANT CHANGE UP
MONOCYTES # BLD AUTO: 0.22 K/UL — SIGNIFICANT CHANGE UP (ref 0–0.9)
MONOCYTES # BLD AUTO: 0.43 K/UL — SIGNIFICANT CHANGE UP (ref 0–0.9)
MONOCYTES # BLD AUTO: 0.59 K/UL — SIGNIFICANT CHANGE UP (ref 0–0.9)
MONOCYTES # BLD AUTO: 0.61 K/UL — SIGNIFICANT CHANGE UP (ref 0–0.9)
MONOCYTES # BLD AUTO: 0.64 K/UL — SIGNIFICANT CHANGE UP (ref 0–0.9)
MONOCYTES # BLD AUTO: 0.69 K/UL — SIGNIFICANT CHANGE UP (ref 0–0.9)
MONOCYTES # BLD AUTO: 0.83 K/UL — SIGNIFICANT CHANGE UP (ref 0–0.9)
MONOCYTES # BLD AUTO: 0.89 K/UL — SIGNIFICANT CHANGE UP (ref 0–0.9)
MONOCYTES # BLD AUTO: 0.94 K/UL — HIGH (ref 0–0.9)
MONOCYTES NFR BLD AUTO: 1.9 % — LOW (ref 2–14)
MONOCYTES NFR BLD AUTO: 10 % — SIGNIFICANT CHANGE UP (ref 2–14)
MONOCYTES NFR BLD AUTO: 2.6 % — SIGNIFICANT CHANGE UP (ref 2–14)
MONOCYTES NFR BLD AUTO: 5.4 % — SIGNIFICANT CHANGE UP (ref 2–14)
MONOCYTES NFR BLD AUTO: 6.3 % — SIGNIFICANT CHANGE UP (ref 2–14)
MONOCYTES NFR BLD AUTO: 7.8 % — SIGNIFICANT CHANGE UP (ref 2–14)
MONOCYTES NFR BLD AUTO: 7.9 % — SIGNIFICANT CHANGE UP (ref 2–14)
MONOCYTES NFR BLD AUTO: 8.3 % — SIGNIFICANT CHANGE UP (ref 2–14)
MONOCYTES NFR BLD AUTO: 9.9 % — SIGNIFICANT CHANGE UP (ref 2–14)
MPO AB + PR3 PNL SER: SIGNIFICANT CHANGE UP
MRSA PCR RESULT.: SIGNIFICANT CHANGE UP
NEUTROPHILS # BLD AUTO: 10.56 K/UL — HIGH (ref 1.8–7.4)
NEUTROPHILS # BLD AUTO: 10.89 K/UL — HIGH (ref 1.8–7.4)
NEUTROPHILS # BLD AUTO: 15 K/UL — HIGH (ref 1.8–7.4)
NEUTROPHILS # BLD AUTO: 3.68 K/UL — SIGNIFICANT CHANGE UP (ref 1.8–7.4)
NEUTROPHILS # BLD AUTO: 5.65 K/UL — SIGNIFICANT CHANGE UP (ref 1.8–7.4)
NEUTROPHILS # BLD AUTO: 5.94 K/UL — SIGNIFICANT CHANGE UP (ref 1.8–7.4)
NEUTROPHILS # BLD AUTO: 6.9 K/UL — SIGNIFICANT CHANGE UP (ref 1.8–7.4)
NEUTROPHILS # BLD AUTO: 9.31 K/UL — HIGH (ref 1.8–7.4)
NEUTROPHILS # BLD AUTO: 9.43 K/UL — HIGH (ref 1.8–7.4)
NEUTROPHILS NFR BLD AUTO: 62.1 % — SIGNIFICANT CHANGE UP (ref 43–77)
NEUTROPHILS NFR BLD AUTO: 67.9 % — SIGNIFICANT CHANGE UP (ref 43–77)
NEUTROPHILS NFR BLD AUTO: 73.5 % — SIGNIFICANT CHANGE UP (ref 43–77)
NEUTROPHILS NFR BLD AUTO: 76.8 % — SIGNIFICANT CHANGE UP (ref 43–77)
NEUTROPHILS NFR BLD AUTO: 79.4 % — HIGH (ref 43–77)
NEUTROPHILS NFR BLD AUTO: 80.5 % — HIGH (ref 43–77)
NEUTROPHILS NFR BLD AUTO: 81.2 % — HIGH (ref 43–77)
NEUTROPHILS NFR BLD AUTO: 91.2 % — HIGH (ref 43–77)
NEUTROPHILS NFR BLD AUTO: 92.7 % — HIGH (ref 43–77)
NITRITE UR-MCNC: NEGATIVE — SIGNIFICANT CHANGE UP
NON HDL CHOLESTEROL: 119 MG/DL — SIGNIFICANT CHANGE UP
NRBC # BLD: 0 /100 WBCS — SIGNIFICANT CHANGE UP (ref 0–0)
NRBC # BLD: 1 /100 WBCS — HIGH (ref 0–0)
NRBC # BLD: 1 /100 — HIGH (ref 0–0)
NRBC # BLD: 7 /100 WBCS — HIGH (ref 0–0)
O2 CT VFR BLD CALC: 34 MMHG — SIGNIFICANT CHANGE UP (ref 30–65)
O2 CT VFR BLD CALC: 36 MMHG — SIGNIFICANT CHANGE UP (ref 30–65)
O2 CT VFR BLD CALC: 39 MMHG — SIGNIFICANT CHANGE UP (ref 30–65)
OB PNL STL: NEGATIVE — SIGNIFICANT CHANGE UP
OB PNL STL: POSITIVE
ORGANISM # SPEC MICROSCOPIC CNT: SIGNIFICANT CHANGE UP
OSMOLALITY SERPL: 262 MOSMOL/KG — LOW (ref 280–301)
OSMOLALITY SERPL: 263 MOSMOL/KG — LOW (ref 280–301)
OSMOLALITY SERPL: 281 MOSMOL/KG — SIGNIFICANT CHANGE UP (ref 280–301)
OSMOLALITY UR: 210 MOS/KG — LOW (ref 300–900)
OSMOLALITY UR: 382 MOS/KG — SIGNIFICANT CHANGE UP (ref 300–900)
OSMOLALITY UR: 585 MOS/KG — SIGNIFICANT CHANGE UP (ref 300–900)
OTHER CELLS CSF MANUAL: 10 ML/DL — LOW (ref 18–22)
OTHER CELLS CSF MANUAL: 10 ML/DL — LOW (ref 18–22)
OTHER CELLS CSF MANUAL: 10.5 ML/DL — LOW (ref 18–22)
OTHER CELLS CSF MANUAL: 6 ML/DL — LOW (ref 18–22)
OTHER CELLS CSF MANUAL: 6 ML/DL — LOW (ref 18–22)
OTHER CELLS CSF MANUAL: 7 ML/DL — LOW (ref 18–22)
OTHER CELLS CSF MANUAL: 8 ML/DL — LOW (ref 18–22)
OTHER CELLS CSF MANUAL: 8 ML/DL — LOW (ref 18–22)
P-ANCA SER-ACNC: NEGATIVE — SIGNIFICANT CHANGE UP
PCO2 BLDA: 46 MMHG — SIGNIFICANT CHANGE UP (ref 35–48)
PCO2 BLDMV: 41 MMHG — SIGNIFICANT CHANGE UP (ref 30–65)
PCO2 BLDMV: 44 MMHG — SIGNIFICANT CHANGE UP (ref 30–65)
PCO2 BLDMV: 44 MMHG — SIGNIFICANT CHANGE UP (ref 30–65)
PCO2 BLDV: 34 MMHG — LOW (ref 42–55)
PCO2 BLDV: 39 MMHG — LOW (ref 42–55)
PCO2 BLDV: 40 MMHG — SIGNIFICANT CHANGE UP (ref 35–50)
PCO2 BLDV: 40 MMHG — SIGNIFICANT CHANGE UP (ref 35–50)
PCO2 BLDV: 41 MMHG — SIGNIFICANT CHANGE UP (ref 35–50)
PCO2 BLDV: 42 MMHG — SIGNIFICANT CHANGE UP (ref 42–55)
PCO2 BLDV: 43 MMHG — SIGNIFICANT CHANGE UP (ref 35–50)
PCO2 BLDV: 43 MMHG — SIGNIFICANT CHANGE UP (ref 35–50)
PCO2 BLDV: 44 MMHG — SIGNIFICANT CHANGE UP (ref 35–50)
PCO2 BLDV: 45 MMHG — SIGNIFICANT CHANGE UP (ref 35–50)
PCO2 BLDV: 45 MMHG — SIGNIFICANT CHANGE UP (ref 35–50)
PCO2 BLDV: 46 MMHG — SIGNIFICANT CHANGE UP (ref 35–50)
PCO2 BLDV: 46 MMHG — SIGNIFICANT CHANGE UP (ref 35–50)
PCO2 BLDV: 47 MMHG — SIGNIFICANT CHANGE UP (ref 35–50)
PCO2 BLDV: 48 MMHG — SIGNIFICANT CHANGE UP (ref 35–50)
PCO2 BLDV: 48 MMHG — SIGNIFICANT CHANGE UP (ref 42–55)
PCO2 BLDV: 49 MMHG — SIGNIFICANT CHANGE UP (ref 35–50)
PCO2 BLDV: 50 MMHG — SIGNIFICANT CHANGE UP (ref 35–50)
PCO2 BLDV: 50 MMHG — SIGNIFICANT CHANGE UP (ref 42–55)
PCO2 BLDV: 51 MMHG — HIGH (ref 35–50)
PCO2 BLDV: 52 MMHG — SIGNIFICANT CHANGE UP (ref 42–55)
PCO2 BLDV: 55 MMHG — SIGNIFICANT CHANGE UP (ref 42–55)
PCO2 BLDV: 61 MMHG — HIGH (ref 42–55)
PH BLDA: 7.39 — SIGNIFICANT CHANGE UP (ref 7.35–7.45)
PH BLDMV: 7.32 — SIGNIFICANT CHANGE UP (ref 7.32–7.45)
PH BLDMV: 7.33 — SIGNIFICANT CHANGE UP (ref 7.32–7.45)
PH BLDMV: 7.33 — SIGNIFICANT CHANGE UP (ref 7.32–7.45)
PH BLDV: 7.25 — LOW (ref 7.32–7.43)
PH BLDV: 7.27 — LOW (ref 7.35–7.45)
PH BLDV: 7.29 — LOW (ref 7.35–7.45)
PH BLDV: 7.3 — LOW (ref 7.35–7.45)
PH BLDV: 7.32 — LOW (ref 7.35–7.45)
PH BLDV: 7.32 — LOW (ref 7.35–7.45)
PH BLDV: 7.32 — SIGNIFICANT CHANGE UP (ref 7.32–7.43)
PH BLDV: 7.32 — SIGNIFICANT CHANGE UP (ref 7.32–7.43)
PH BLDV: 7.33 — LOW (ref 7.35–7.45)
PH BLDV: 7.33 — LOW (ref 7.35–7.45)
PH BLDV: 7.33 — SIGNIFICANT CHANGE UP (ref 7.32–7.43)
PH BLDV: 7.34 — LOW (ref 7.35–7.45)
PH BLDV: 7.34 — LOW (ref 7.35–7.45)
PH BLDV: 7.35 — SIGNIFICANT CHANGE UP (ref 7.32–7.43)
PH BLDV: 7.35 — SIGNIFICANT CHANGE UP (ref 7.32–7.43)
PH BLDV: 7.35 — SIGNIFICANT CHANGE UP (ref 7.35–7.45)
PH BLDV: 7.35 — SIGNIFICANT CHANGE UP (ref 7.35–7.45)
PH BLDV: 7.36 — SIGNIFICANT CHANGE UP (ref 7.32–7.43)
PH BLDV: 7.36 — SIGNIFICANT CHANGE UP (ref 7.35–7.45)
PH BLDV: 7.36 — SIGNIFICANT CHANGE UP (ref 7.35–7.45)
PH BLDV: 7.37 — SIGNIFICANT CHANGE UP (ref 7.32–7.43)
PH BLDV: 7.39 — SIGNIFICANT CHANGE UP (ref 7.35–7.45)
PH BLDV: 7.4 — SIGNIFICANT CHANGE UP (ref 7.35–7.45)
PH UR: 5.5 — SIGNIFICANT CHANGE UP (ref 5–8)
PH UR: 6 — SIGNIFICANT CHANGE UP (ref 5–8)
PH UR: 6.5 — SIGNIFICANT CHANGE UP (ref 5–8)
PH UR: 7 — SIGNIFICANT CHANGE UP (ref 5–8)
PH UR: 8 — SIGNIFICANT CHANGE UP (ref 5–8)
PHOSPHATE SERPL-MCNC: 0.9 MG/DL — CRITICAL LOW (ref 2.5–4.5)
PHOSPHATE SERPL-MCNC: 1.2 MG/DL — LOW (ref 2.5–4.5)
PHOSPHATE SERPL-MCNC: 1.3 MG/DL — LOW (ref 2.5–4.5)
PHOSPHATE SERPL-MCNC: 1.5 MG/DL — LOW (ref 2.5–4.5)
PHOSPHATE SERPL-MCNC: 1.6 MG/DL — LOW (ref 2.5–4.5)
PHOSPHATE SERPL-MCNC: 1.7 MG/DL — LOW (ref 2.5–4.5)
PHOSPHATE SERPL-MCNC: 1.8 MG/DL — LOW (ref 2.5–4.5)
PHOSPHATE SERPL-MCNC: 1.9 MG/DL — LOW (ref 2.5–4.5)
PHOSPHATE SERPL-MCNC: 2 MG/DL — LOW (ref 2.5–4.5)
PHOSPHATE SERPL-MCNC: 2.1 MG/DL — LOW (ref 2.5–4.5)
PHOSPHATE SERPL-MCNC: 2.1 MG/DL — LOW (ref 2.5–4.5)
PHOSPHATE SERPL-MCNC: 2.2 MG/DL — LOW (ref 2.5–4.5)
PHOSPHATE SERPL-MCNC: 2.3 MG/DL — LOW (ref 2.5–4.5)
PHOSPHATE SERPL-MCNC: 2.4 MG/DL — LOW (ref 2.5–4.5)
PHOSPHATE SERPL-MCNC: 2.5 MG/DL — SIGNIFICANT CHANGE UP (ref 2.5–4.5)
PHOSPHATE SERPL-MCNC: 2.6 MG/DL — SIGNIFICANT CHANGE UP (ref 2.5–4.5)
PHOSPHATE SERPL-MCNC: 2.7 MG/DL — SIGNIFICANT CHANGE UP (ref 2.5–4.5)
PHOSPHATE SERPL-MCNC: 2.8 MG/DL — SIGNIFICANT CHANGE UP (ref 2.5–4.5)
PHOSPHATE SERPL-MCNC: 2.9 MG/DL — SIGNIFICANT CHANGE UP (ref 2.5–4.5)
PHOSPHATE SERPL-MCNC: 3 MG/DL — SIGNIFICANT CHANGE UP (ref 2.5–4.5)
PHOSPHATE SERPL-MCNC: 3.1 MG/DL — SIGNIFICANT CHANGE UP (ref 2.5–4.5)
PHOSPHATE SERPL-MCNC: 3.2 MG/DL — SIGNIFICANT CHANGE UP (ref 2.5–4.5)
PHOSPHATE SERPL-MCNC: 3.3 MG/DL — SIGNIFICANT CHANGE UP (ref 2.5–4.5)
PHOSPHATE SERPL-MCNC: 3.4 MG/DL — SIGNIFICANT CHANGE UP (ref 2.5–4.5)
PHOSPHATE SERPL-MCNC: 3.5 MG/DL — SIGNIFICANT CHANGE UP (ref 2.5–4.5)
PHOSPHATE SERPL-MCNC: 3.6 MG/DL — SIGNIFICANT CHANGE UP (ref 2.5–4.5)
PHOSPHATE SERPL-MCNC: 3.7 MG/DL — SIGNIFICANT CHANGE UP (ref 2.5–4.5)
PHOSPHATE SERPL-MCNC: 3.8 MG/DL — SIGNIFICANT CHANGE UP (ref 2.5–4.5)
PHOSPHATE SERPL-MCNC: 4 MG/DL — SIGNIFICANT CHANGE UP (ref 2.5–4.5)
PHOSPHATE SERPL-MCNC: 4.1 MG/DL — SIGNIFICANT CHANGE UP (ref 2.5–4.5)
PHOSPHATE SERPL-MCNC: 4.3 MG/DL — SIGNIFICANT CHANGE UP (ref 2.5–4.5)
PHOSPHATE SERPL-MCNC: 4.3 MG/DL — SIGNIFICANT CHANGE UP (ref 2.5–4.5)
PHOSPHATE SERPL-MCNC: 4.4 MG/DL — SIGNIFICANT CHANGE UP (ref 2.5–4.5)
PHOSPHATE SERPL-MCNC: 4.5 MG/DL — SIGNIFICANT CHANGE UP (ref 2.5–4.5)
PHOSPHATE SERPL-MCNC: 4.6 MG/DL — HIGH (ref 2.5–4.5)
PHOSPHATE SERPL-MCNC: 5.5 MG/DL — HIGH (ref 2.5–4.5)
PLAT MORPH BLD: ABNORMAL
PLATELET # BLD AUTO: 100 K/UL — LOW (ref 150–400)
PLATELET # BLD AUTO: 103 K/UL — LOW (ref 150–400)
PLATELET # BLD AUTO: 104 K/UL — LOW (ref 150–400)
PLATELET # BLD AUTO: 105 K/UL — LOW (ref 150–400)
PLATELET # BLD AUTO: 106 K/UL — LOW (ref 150–400)
PLATELET # BLD AUTO: 111 K/UL — LOW (ref 150–400)
PLATELET # BLD AUTO: 112 K/UL — LOW (ref 150–400)
PLATELET # BLD AUTO: 114 K/UL — LOW (ref 150–400)
PLATELET # BLD AUTO: 118 K/UL — LOW (ref 150–400)
PLATELET # BLD AUTO: 119 K/UL — LOW (ref 150–400)
PLATELET # BLD AUTO: 120 K/UL — LOW (ref 150–400)
PLATELET # BLD AUTO: 123 K/UL — LOW (ref 150–400)
PLATELET # BLD AUTO: 125 K/UL — LOW (ref 150–400)
PLATELET # BLD AUTO: 128 K/UL — LOW (ref 150–400)
PLATELET # BLD AUTO: 128 K/UL — LOW (ref 150–400)
PLATELET # BLD AUTO: 129 K/UL — LOW (ref 150–400)
PLATELET # BLD AUTO: 129 K/UL — LOW (ref 150–400)
PLATELET # BLD AUTO: 130 K/UL — LOW (ref 150–400)
PLATELET # BLD AUTO: 130 K/UL — LOW (ref 150–400)
PLATELET # BLD AUTO: 131 K/UL — LOW (ref 150–400)
PLATELET # BLD AUTO: 131 K/UL — LOW (ref 150–400)
PLATELET # BLD AUTO: 135 K/UL — LOW (ref 150–400)
PLATELET # BLD AUTO: 137 K/UL — LOW (ref 150–400)
PLATELET # BLD AUTO: 138 K/UL — LOW (ref 150–400)
PLATELET # BLD AUTO: 138 K/UL — LOW (ref 150–400)
PLATELET # BLD AUTO: 142 K/UL — LOW (ref 150–400)
PLATELET # BLD AUTO: 143 K/UL — LOW (ref 150–400)
PLATELET # BLD AUTO: 145 K/UL — LOW (ref 150–400)
PLATELET # BLD AUTO: 150 K/UL — SIGNIFICANT CHANGE UP (ref 150–400)
PLATELET # BLD AUTO: 153 K/UL — SIGNIFICANT CHANGE UP (ref 150–400)
PLATELET # BLD AUTO: 155 K/UL — SIGNIFICANT CHANGE UP (ref 150–400)
PLATELET # BLD AUTO: 156 K/UL — SIGNIFICANT CHANGE UP (ref 150–400)
PLATELET # BLD AUTO: 158 K/UL — SIGNIFICANT CHANGE UP (ref 150–400)
PLATELET # BLD AUTO: 160 K/UL — SIGNIFICANT CHANGE UP (ref 150–400)
PLATELET # BLD AUTO: 163 K/UL — SIGNIFICANT CHANGE UP (ref 150–400)
PLATELET # BLD AUTO: 163 K/UL — SIGNIFICANT CHANGE UP (ref 150–400)
PLATELET # BLD AUTO: 168 K/UL — SIGNIFICANT CHANGE UP (ref 150–400)
PLATELET # BLD AUTO: 168 K/UL — SIGNIFICANT CHANGE UP (ref 150–400)
PLATELET # BLD AUTO: 174 K/UL — SIGNIFICANT CHANGE UP (ref 150–400)
PLATELET # BLD AUTO: 176 K/UL — SIGNIFICANT CHANGE UP (ref 150–400)
PLATELET # BLD AUTO: 177 K/UL — SIGNIFICANT CHANGE UP (ref 150–400)
PLATELET # BLD AUTO: 179 K/UL — SIGNIFICANT CHANGE UP (ref 150–400)
PLATELET # BLD AUTO: 179 K/UL — SIGNIFICANT CHANGE UP (ref 150–400)
PLATELET # BLD AUTO: 180 K/UL — SIGNIFICANT CHANGE UP (ref 150–400)
PLATELET # BLD AUTO: 181 K/UL — SIGNIFICANT CHANGE UP (ref 150–400)
PLATELET # BLD AUTO: 182 K/UL — SIGNIFICANT CHANGE UP (ref 150–400)
PLATELET # BLD AUTO: 182 K/UL — SIGNIFICANT CHANGE UP (ref 150–400)
PLATELET # BLD AUTO: 184 K/UL — SIGNIFICANT CHANGE UP (ref 150–400)
PLATELET # BLD AUTO: 184 K/UL — SIGNIFICANT CHANGE UP (ref 150–400)
PLATELET # BLD AUTO: 185 K/UL — SIGNIFICANT CHANGE UP (ref 150–400)
PLATELET # BLD AUTO: 186 K/UL — SIGNIFICANT CHANGE UP (ref 150–400)
PLATELET # BLD AUTO: 188 K/UL — SIGNIFICANT CHANGE UP (ref 150–400)
PLATELET # BLD AUTO: 190 K/UL — SIGNIFICANT CHANGE UP (ref 150–400)
PLATELET # BLD AUTO: 190 K/UL — SIGNIFICANT CHANGE UP (ref 150–400)
PLATELET # BLD AUTO: 191 K/UL — SIGNIFICANT CHANGE UP (ref 150–400)
PLATELET # BLD AUTO: 194 K/UL — SIGNIFICANT CHANGE UP (ref 150–400)
PLATELET # BLD AUTO: 194 K/UL — SIGNIFICANT CHANGE UP (ref 150–400)
PLATELET # BLD AUTO: 195 K/UL — SIGNIFICANT CHANGE UP (ref 150–400)
PLATELET # BLD AUTO: 195 K/UL — SIGNIFICANT CHANGE UP (ref 150–400)
PLATELET # BLD AUTO: 196 K/UL — SIGNIFICANT CHANGE UP (ref 150–400)
PLATELET # BLD AUTO: 197 K/UL — SIGNIFICANT CHANGE UP (ref 150–400)
PLATELET # BLD AUTO: 198 K/UL — SIGNIFICANT CHANGE UP (ref 150–400)
PLATELET # BLD AUTO: 198 K/UL — SIGNIFICANT CHANGE UP (ref 150–400)
PLATELET # BLD AUTO: 199 K/UL — SIGNIFICANT CHANGE UP (ref 150–400)
PLATELET # BLD AUTO: 200 K/UL — SIGNIFICANT CHANGE UP (ref 150–400)
PLATELET # BLD AUTO: 200 K/UL — SIGNIFICANT CHANGE UP (ref 150–400)
PLATELET # BLD AUTO: 202 K/UL — SIGNIFICANT CHANGE UP (ref 150–400)
PLATELET # BLD AUTO: 205 K/UL — SIGNIFICANT CHANGE UP (ref 150–400)
PLATELET # BLD AUTO: 206 K/UL — SIGNIFICANT CHANGE UP (ref 150–400)
PLATELET # BLD AUTO: 208 K/UL — SIGNIFICANT CHANGE UP (ref 150–400)
PLATELET # BLD AUTO: 208 K/UL — SIGNIFICANT CHANGE UP (ref 150–400)
PLATELET # BLD AUTO: 209 K/UL — SIGNIFICANT CHANGE UP (ref 150–400)
PLATELET # BLD AUTO: 210 K/UL — SIGNIFICANT CHANGE UP (ref 150–400)
PLATELET # BLD AUTO: 210 K/UL — SIGNIFICANT CHANGE UP (ref 150–400)
PLATELET # BLD AUTO: 212 K/UL — SIGNIFICANT CHANGE UP (ref 150–400)
PLATELET # BLD AUTO: 213 K/UL — SIGNIFICANT CHANGE UP (ref 150–400)
PLATELET # BLD AUTO: 214 K/UL — SIGNIFICANT CHANGE UP (ref 150–400)
PLATELET # BLD AUTO: 214 K/UL — SIGNIFICANT CHANGE UP (ref 150–400)
PLATELET # BLD AUTO: 216 K/UL — SIGNIFICANT CHANGE UP (ref 150–400)
PLATELET # BLD AUTO: 216 K/UL — SIGNIFICANT CHANGE UP (ref 150–400)
PLATELET # BLD AUTO: 217 K/UL — SIGNIFICANT CHANGE UP (ref 150–400)
PLATELET # BLD AUTO: 217 K/UL — SIGNIFICANT CHANGE UP (ref 150–400)
PLATELET # BLD AUTO: 220 K/UL — SIGNIFICANT CHANGE UP (ref 150–400)
PLATELET # BLD AUTO: 221 K/UL — SIGNIFICANT CHANGE UP (ref 150–400)
PLATELET # BLD AUTO: 222 K/UL — SIGNIFICANT CHANGE UP (ref 150–400)
PLATELET # BLD AUTO: 223 K/UL — SIGNIFICANT CHANGE UP (ref 150–400)
PLATELET # BLD AUTO: 224 K/UL — SIGNIFICANT CHANGE UP (ref 150–400)
PLATELET # BLD AUTO: 225 K/UL — SIGNIFICANT CHANGE UP (ref 150–400)
PLATELET # BLD AUTO: 227 K/UL — SIGNIFICANT CHANGE UP (ref 150–400)
PLATELET # BLD AUTO: 228 K/UL — SIGNIFICANT CHANGE UP (ref 150–400)
PLATELET # BLD AUTO: 230 K/UL — SIGNIFICANT CHANGE UP (ref 150–400)
PLATELET # BLD AUTO: 230 K/UL — SIGNIFICANT CHANGE UP (ref 150–400)
PLATELET # BLD AUTO: 231 K/UL — SIGNIFICANT CHANGE UP (ref 150–400)
PLATELET # BLD AUTO: 232 K/UL — SIGNIFICANT CHANGE UP (ref 150–400)
PLATELET # BLD AUTO: 232 K/UL — SIGNIFICANT CHANGE UP (ref 150–400)
PLATELET # BLD AUTO: 233 K/UL — SIGNIFICANT CHANGE UP (ref 150–400)
PLATELET # BLD AUTO: 234 K/UL — SIGNIFICANT CHANGE UP (ref 150–400)
PLATELET # BLD AUTO: 234 K/UL — SIGNIFICANT CHANGE UP (ref 150–400)
PLATELET # BLD AUTO: 235 K/UL — SIGNIFICANT CHANGE UP (ref 150–400)
PLATELET # BLD AUTO: 236 K/UL — SIGNIFICANT CHANGE UP (ref 150–400)
PLATELET # BLD AUTO: 236 K/UL — SIGNIFICANT CHANGE UP (ref 150–400)
PLATELET # BLD AUTO: 238 K/UL — SIGNIFICANT CHANGE UP (ref 150–400)
PLATELET # BLD AUTO: 239 K/UL — SIGNIFICANT CHANGE UP (ref 150–400)
PLATELET # BLD AUTO: 239 K/UL — SIGNIFICANT CHANGE UP (ref 150–400)
PLATELET # BLD AUTO: 240 K/UL — SIGNIFICANT CHANGE UP (ref 150–400)
PLATELET # BLD AUTO: 242 K/UL — SIGNIFICANT CHANGE UP (ref 150–400)
PLATELET # BLD AUTO: 245 K/UL — SIGNIFICANT CHANGE UP (ref 150–400)
PLATELET # BLD AUTO: 245 K/UL — SIGNIFICANT CHANGE UP (ref 150–400)
PLATELET # BLD AUTO: 246 K/UL — SIGNIFICANT CHANGE UP (ref 150–400)
PLATELET # BLD AUTO: 246 K/UL — SIGNIFICANT CHANGE UP (ref 150–400)
PLATELET # BLD AUTO: 251 K/UL — SIGNIFICANT CHANGE UP (ref 150–400)
PLATELET # BLD AUTO: 253 K/UL — SIGNIFICANT CHANGE UP (ref 150–400)
PLATELET # BLD AUTO: 254 K/UL — SIGNIFICANT CHANGE UP (ref 150–400)
PLATELET # BLD AUTO: 256 K/UL — SIGNIFICANT CHANGE UP (ref 150–400)
PLATELET # BLD AUTO: 256 K/UL — SIGNIFICANT CHANGE UP (ref 150–400)
PLATELET # BLD AUTO: 257 K/UL — SIGNIFICANT CHANGE UP (ref 150–400)
PLATELET # BLD AUTO: 259 K/UL — SIGNIFICANT CHANGE UP (ref 150–400)
PLATELET # BLD AUTO: 260 K/UL — SIGNIFICANT CHANGE UP (ref 150–400)
PLATELET # BLD AUTO: 261 K/UL — SIGNIFICANT CHANGE UP (ref 150–400)
PLATELET # BLD AUTO: 263 K/UL — SIGNIFICANT CHANGE UP (ref 150–400)
PLATELET # BLD AUTO: 264 K/UL — SIGNIFICANT CHANGE UP (ref 150–400)
PLATELET # BLD AUTO: 265 K/UL — SIGNIFICANT CHANGE UP (ref 150–400)
PLATELET # BLD AUTO: 268 K/UL — SIGNIFICANT CHANGE UP (ref 150–400)
PLATELET # BLD AUTO: 272 K/UL — SIGNIFICANT CHANGE UP (ref 150–400)
PLATELET # BLD AUTO: 273 K/UL — SIGNIFICANT CHANGE UP (ref 150–400)
PLATELET # BLD AUTO: 276 K/UL — SIGNIFICANT CHANGE UP (ref 150–400)
PLATELET # BLD AUTO: 278 K/UL — SIGNIFICANT CHANGE UP (ref 150–400)
PLATELET # BLD AUTO: 279 K/UL — SIGNIFICANT CHANGE UP (ref 150–400)
PLATELET # BLD AUTO: 281 K/UL — SIGNIFICANT CHANGE UP (ref 150–400)
PLATELET # BLD AUTO: 281 K/UL — SIGNIFICANT CHANGE UP (ref 150–400)
PLATELET # BLD AUTO: 283 K/UL — SIGNIFICANT CHANGE UP (ref 150–400)
PLATELET # BLD AUTO: 284 K/UL — SIGNIFICANT CHANGE UP (ref 150–400)
PLATELET # BLD AUTO: 288 K/UL — SIGNIFICANT CHANGE UP (ref 150–400)
PLATELET # BLD AUTO: 288 K/UL — SIGNIFICANT CHANGE UP (ref 150–400)
PLATELET # BLD AUTO: 289 K/UL — SIGNIFICANT CHANGE UP (ref 150–400)
PLATELET # BLD AUTO: 289 K/UL — SIGNIFICANT CHANGE UP (ref 150–400)
PLATELET # BLD AUTO: 290 K/UL — SIGNIFICANT CHANGE UP (ref 150–400)
PLATELET # BLD AUTO: 290 K/UL — SIGNIFICANT CHANGE UP (ref 150–400)
PLATELET # BLD AUTO: 291 K/UL — SIGNIFICANT CHANGE UP (ref 150–400)
PLATELET # BLD AUTO: 292 K/UL — SIGNIFICANT CHANGE UP (ref 150–400)
PLATELET # BLD AUTO: 293 K/UL — SIGNIFICANT CHANGE UP (ref 150–400)
PLATELET # BLD AUTO: 297 K/UL — SIGNIFICANT CHANGE UP (ref 150–400)
PLATELET # BLD AUTO: 298 K/UL — SIGNIFICANT CHANGE UP (ref 150–400)
PLATELET # BLD AUTO: 298 K/UL — SIGNIFICANT CHANGE UP (ref 150–400)
PLATELET # BLD AUTO: 299 K/UL — SIGNIFICANT CHANGE UP (ref 150–400)
PLATELET # BLD AUTO: 299 K/UL — SIGNIFICANT CHANGE UP (ref 150–400)
PLATELET # BLD AUTO: 301 K/UL — SIGNIFICANT CHANGE UP (ref 150–400)
PLATELET # BLD AUTO: 302 K/UL — SIGNIFICANT CHANGE UP (ref 150–400)
PLATELET # BLD AUTO: 302 K/UL — SIGNIFICANT CHANGE UP (ref 150–400)
PLATELET # BLD AUTO: 303 K/UL — SIGNIFICANT CHANGE UP (ref 150–400)
PLATELET # BLD AUTO: 310 K/UL — SIGNIFICANT CHANGE UP (ref 150–400)
PLATELET # BLD AUTO: 312 K/UL — SIGNIFICANT CHANGE UP (ref 150–400)
PLATELET # BLD AUTO: 315 K/UL — SIGNIFICANT CHANGE UP (ref 150–400)
PLATELET # BLD AUTO: 316 K/UL — SIGNIFICANT CHANGE UP (ref 150–400)
PLATELET # BLD AUTO: 318 K/UL — SIGNIFICANT CHANGE UP (ref 150–400)
PLATELET # BLD AUTO: 320 K/UL — SIGNIFICANT CHANGE UP (ref 150–400)
PLATELET # BLD AUTO: 323 K/UL — SIGNIFICANT CHANGE UP (ref 150–400)
PLATELET # BLD AUTO: 325 K/UL — SIGNIFICANT CHANGE UP (ref 150–400)
PLATELET # BLD AUTO: 325 K/UL — SIGNIFICANT CHANGE UP (ref 150–400)
PLATELET # BLD AUTO: 326 K/UL — SIGNIFICANT CHANGE UP (ref 150–400)
PLATELET # BLD AUTO: 330 K/UL — SIGNIFICANT CHANGE UP (ref 150–400)
PLATELET # BLD AUTO: 330 K/UL — SIGNIFICANT CHANGE UP (ref 150–400)
PLATELET # BLD AUTO: 332 K/UL — SIGNIFICANT CHANGE UP (ref 150–400)
PLATELET # BLD AUTO: 332 K/UL — SIGNIFICANT CHANGE UP (ref 150–400)
PLATELET # BLD AUTO: 335 K/UL — SIGNIFICANT CHANGE UP (ref 150–400)
PLATELET # BLD AUTO: 345 K/UL — SIGNIFICANT CHANGE UP (ref 150–400)
PLATELET # BLD AUTO: 346 K/UL — SIGNIFICANT CHANGE UP (ref 150–400)
PLATELET # BLD AUTO: 349 K/UL — SIGNIFICANT CHANGE UP (ref 150–400)
PLATELET # BLD AUTO: 353 K/UL — SIGNIFICANT CHANGE UP (ref 150–400)
PLATELET # BLD AUTO: 355 K/UL — SIGNIFICANT CHANGE UP (ref 150–400)
PLATELET # BLD AUTO: 358 K/UL — SIGNIFICANT CHANGE UP (ref 150–400)
PLATELET # BLD AUTO: 367 K/UL — SIGNIFICANT CHANGE UP (ref 150–400)
PLATELET # BLD AUTO: 370 K/UL — SIGNIFICANT CHANGE UP (ref 150–400)
PLATELET # BLD AUTO: 377 K/UL — SIGNIFICANT CHANGE UP (ref 150–400)
PLATELET # BLD AUTO: 384 K/UL — SIGNIFICANT CHANGE UP (ref 150–400)
PLATELET # BLD AUTO: 385 K/UL — SIGNIFICANT CHANGE UP (ref 150–400)
PLATELET # BLD AUTO: 397 K/UL — SIGNIFICANT CHANGE UP (ref 150–400)
PLATELET # BLD AUTO: 405 K/UL — HIGH (ref 150–400)
PLATELET # BLD AUTO: 418 K/UL — HIGH (ref 150–400)
PLATELET # BLD AUTO: 432 K/UL — HIGH (ref 150–400)
PLATELET # BLD AUTO: 45 K/UL — LOW (ref 150–400)
PLATELET # BLD AUTO: 53 K/UL — LOW (ref 150–400)
PLATELET # BLD AUTO: 57 K/UL — LOW (ref 150–400)
PLATELET # BLD AUTO: 62 K/UL — LOW (ref 150–400)
PLATELET # BLD AUTO: 73 K/UL — LOW (ref 150–400)
PLATELET # BLD AUTO: 97 K/UL — LOW (ref 150–400)
PO2 BLDA: 181 MMHG — HIGH (ref 83–108)
PO2 BLDV: 20 MMHG — LOW (ref 25–45)
PO2 BLDV: 30 MMHG — SIGNIFICANT CHANGE UP (ref 25–45)
PO2 BLDV: 30 MMHG — SIGNIFICANT CHANGE UP (ref 25–45)
PO2 BLDV: 33 MMHG — SIGNIFICANT CHANGE UP (ref 25–45)
PO2 BLDV: 33 MMHG — SIGNIFICANT CHANGE UP (ref 25–45)
PO2 BLDV: 34 MMHG — SIGNIFICANT CHANGE UP (ref 25–45)
PO2 BLDV: 35 MMHG — SIGNIFICANT CHANGE UP (ref 25–45)
PO2 BLDV: 36 MMHG — SIGNIFICANT CHANGE UP (ref 25–45)
PO2 BLDV: 37 MMHG — SIGNIFICANT CHANGE UP (ref 25–45)
PO2 BLDV: 37 MMHG — SIGNIFICANT CHANGE UP (ref 25–45)
PO2 BLDV: 39 MMHG — SIGNIFICANT CHANGE UP (ref 25–45)
PO2 BLDV: 40 MMHG — SIGNIFICANT CHANGE UP (ref 25–45)
PO2 BLDV: 40 MMHG — SIGNIFICANT CHANGE UP (ref 25–45)
PO2 BLDV: 41 MMHG — SIGNIFICANT CHANGE UP (ref 25–45)
PO2 BLDV: 41 MMHG — SIGNIFICANT CHANGE UP (ref 25–45)
PO2 BLDV: 45 MMHG — SIGNIFICANT CHANGE UP (ref 25–45)
PO2 BLDV: 49 MMHG — HIGH (ref 25–45)
PO2 BLDV: 52 MMHG — HIGH (ref 25–45)
PO2 BLDV: 54 MMHG — HIGH (ref 25–45)
PO2 BLDV: 56 MMHG — HIGH (ref 25–45)
PO2 BLDV: 67 MMHG — HIGH (ref 25–45)
POIKILOCYTOSIS BLD QL AUTO: SIGNIFICANT CHANGE UP
POLYCHROMASIA BLD QL SMEAR: SLIGHT — SIGNIFICANT CHANGE UP
POTASSIUM BLDA-SCNC: 3 MMOL/L — LOW (ref 3.5–5.1)
POTASSIUM BLDA-SCNC: 3.6 MMOL/L — SIGNIFICANT CHANGE UP (ref 3.5–5.3)
POTASSIUM BLDA-SCNC: 3.6 MMOL/L — SIGNIFICANT CHANGE UP (ref 3.5–5.3)
POTASSIUM BLDA-SCNC: 4.1 MMOL/L — SIGNIFICANT CHANGE UP (ref 3.5–5.1)
POTASSIUM BLDA-SCNC: 5.2 MMOL/L — HIGH (ref 3.5–5.1)
POTASSIUM BLDA-SCNC: 5.3 MMOL/L — HIGH (ref 3.5–5.1)
POTASSIUM BLDV-SCNC: 3.4 MMOL/L — LOW (ref 3.5–5.1)
POTASSIUM BLDV-SCNC: 3.5 MMOL/L — SIGNIFICANT CHANGE UP (ref 3.5–5.3)
POTASSIUM BLDV-SCNC: 3.6 MMOL/L — SIGNIFICANT CHANGE UP (ref 3.5–5.1)
POTASSIUM BLDV-SCNC: 3.8 MMOL/L — SIGNIFICANT CHANGE UP (ref 3.5–5.3)
POTASSIUM BLDV-SCNC: 4 MMOL/L — SIGNIFICANT CHANGE UP (ref 3.5–5.3)
POTASSIUM BLDV-SCNC: 4.1 MMOL/L — SIGNIFICANT CHANGE UP (ref 3.5–5.3)
POTASSIUM BLDV-SCNC: 4.2 MMOL/L — SIGNIFICANT CHANGE UP (ref 3.5–5.1)
POTASSIUM BLDV-SCNC: 4.3 MMOL/L — SIGNIFICANT CHANGE UP (ref 3.5–5.3)
POTASSIUM BLDV-SCNC: 4.4 MMOL/L — SIGNIFICANT CHANGE UP (ref 3.5–5.1)
POTASSIUM BLDV-SCNC: 4.4 MMOL/L — SIGNIFICANT CHANGE UP (ref 3.5–5.3)
POTASSIUM BLDV-SCNC: 5.2 MMOL/L — HIGH (ref 3.5–5.1)
POTASSIUM SERPL-MCNC: 3.1 MMOL/L — LOW (ref 3.5–5.3)
POTASSIUM SERPL-MCNC: 3.2 MMOL/L — LOW (ref 3.5–5.3)
POTASSIUM SERPL-MCNC: 3.2 MMOL/L — LOW (ref 3.5–5.3)
POTASSIUM SERPL-MCNC: 3.3 MMOL/L — LOW (ref 3.5–5.3)
POTASSIUM SERPL-MCNC: 3.4 MMOL/L — LOW (ref 3.5–5.3)
POTASSIUM SERPL-MCNC: 3.5 MMOL/L — SIGNIFICANT CHANGE UP (ref 3.5–5.3)
POTASSIUM SERPL-MCNC: 3.6 MMOL/L — SIGNIFICANT CHANGE UP (ref 3.5–5.3)
POTASSIUM SERPL-MCNC: 3.7 MMOL/L — SIGNIFICANT CHANGE UP (ref 3.5–5.3)
POTASSIUM SERPL-MCNC: 3.8 MMOL/L — SIGNIFICANT CHANGE UP (ref 3.5–5.3)
POTASSIUM SERPL-MCNC: 3.9 MMOL/L — SIGNIFICANT CHANGE UP (ref 3.5–5.3)
POTASSIUM SERPL-MCNC: 4 MMOL/L — SIGNIFICANT CHANGE UP (ref 3.5–5.3)
POTASSIUM SERPL-MCNC: 4.1 MMOL/L — SIGNIFICANT CHANGE UP (ref 3.5–5.3)
POTASSIUM SERPL-MCNC: 4.2 MMOL/L — SIGNIFICANT CHANGE UP (ref 3.5–5.3)
POTASSIUM SERPL-MCNC: 4.3 MMOL/L — SIGNIFICANT CHANGE UP (ref 3.5–5.3)
POTASSIUM SERPL-MCNC: 4.4 MMOL/L — SIGNIFICANT CHANGE UP (ref 3.5–5.3)
POTASSIUM SERPL-MCNC: 4.5 MMOL/L — SIGNIFICANT CHANGE UP (ref 3.5–5.3)
POTASSIUM SERPL-MCNC: 4.6 MMOL/L — SIGNIFICANT CHANGE UP (ref 3.5–5.3)
POTASSIUM SERPL-MCNC: 4.7 MMOL/L — SIGNIFICANT CHANGE UP (ref 3.5–5.3)
POTASSIUM SERPL-MCNC: 4.8 MMOL/L — SIGNIFICANT CHANGE UP (ref 3.5–5.3)
POTASSIUM SERPL-MCNC: 4.9 MMOL/L — SIGNIFICANT CHANGE UP (ref 3.5–5.3)
POTASSIUM SERPL-MCNC: 5 MMOL/L — SIGNIFICANT CHANGE UP (ref 3.5–5.3)
POTASSIUM SERPL-MCNC: 5.1 MMOL/L — SIGNIFICANT CHANGE UP (ref 3.5–5.3)
POTASSIUM SERPL-MCNC: 5.3 MMOL/L — SIGNIFICANT CHANGE UP (ref 3.5–5.3)
POTASSIUM SERPL-MCNC: 5.5 MMOL/L — HIGH (ref 3.5–5.3)
POTASSIUM SERPL-SCNC: 3.1 MMOL/L — LOW (ref 3.5–5.3)
POTASSIUM SERPL-SCNC: 3.2 MMOL/L — LOW (ref 3.5–5.3)
POTASSIUM SERPL-SCNC: 3.2 MMOL/L — LOW (ref 3.5–5.3)
POTASSIUM SERPL-SCNC: 3.3 MMOL/L — LOW (ref 3.5–5.3)
POTASSIUM SERPL-SCNC: 3.4 MMOL/L — LOW (ref 3.5–5.3)
POTASSIUM SERPL-SCNC: 3.5 MMOL/L — SIGNIFICANT CHANGE UP (ref 3.5–5.3)
POTASSIUM SERPL-SCNC: 3.6 MMOL/L — SIGNIFICANT CHANGE UP (ref 3.5–5.3)
POTASSIUM SERPL-SCNC: 3.7 MMOL/L — SIGNIFICANT CHANGE UP (ref 3.5–5.3)
POTASSIUM SERPL-SCNC: 3.8 MMOL/L — SIGNIFICANT CHANGE UP (ref 3.5–5.3)
POTASSIUM SERPL-SCNC: 3.9 MMOL/L — SIGNIFICANT CHANGE UP (ref 3.5–5.3)
POTASSIUM SERPL-SCNC: 4 MMOL/L — SIGNIFICANT CHANGE UP (ref 3.5–5.3)
POTASSIUM SERPL-SCNC: 4.1 MMOL/L — SIGNIFICANT CHANGE UP (ref 3.5–5.3)
POTASSIUM SERPL-SCNC: 4.2 MMOL/L — SIGNIFICANT CHANGE UP (ref 3.5–5.3)
POTASSIUM SERPL-SCNC: 4.3 MMOL/L — SIGNIFICANT CHANGE UP (ref 3.5–5.3)
POTASSIUM SERPL-SCNC: 4.4 MMOL/L — SIGNIFICANT CHANGE UP (ref 3.5–5.3)
POTASSIUM SERPL-SCNC: 4.5 MMOL/L — SIGNIFICANT CHANGE UP (ref 3.5–5.3)
POTASSIUM SERPL-SCNC: 4.6 MMOL/L — SIGNIFICANT CHANGE UP (ref 3.5–5.3)
POTASSIUM SERPL-SCNC: 4.7 MMOL/L — SIGNIFICANT CHANGE UP (ref 3.5–5.3)
POTASSIUM SERPL-SCNC: 4.8 MMOL/L — SIGNIFICANT CHANGE UP (ref 3.5–5.3)
POTASSIUM SERPL-SCNC: 4.9 MMOL/L — SIGNIFICANT CHANGE UP (ref 3.5–5.3)
POTASSIUM SERPL-SCNC: 5 MMOL/L — SIGNIFICANT CHANGE UP (ref 3.5–5.3)
POTASSIUM SERPL-SCNC: 5.1 MMOL/L — SIGNIFICANT CHANGE UP (ref 3.5–5.3)
POTASSIUM SERPL-SCNC: 5.3 MMOL/L — SIGNIFICANT CHANGE UP (ref 3.5–5.3)
POTASSIUM SERPL-SCNC: 5.5 MMOL/L — HIGH (ref 3.5–5.3)
PREALB SERPL-MCNC: 10 MG/DL — LOW (ref 20–40)
PREALB SERPL-MCNC: 11 MG/DL — LOW (ref 20–40)
PREALB SERPL-MCNC: 7 MG/DL — LOW (ref 20–40)
PROCALCITONIN SERPL-MCNC: 0.05 NG/ML — SIGNIFICANT CHANGE UP (ref 0.02–0.1)
PROCALCITONIN SERPL-MCNC: 0.06 NG/ML — SIGNIFICANT CHANGE UP (ref 0.02–0.1)
PROCALCITONIN SERPL-MCNC: 0.1 NG/ML — SIGNIFICANT CHANGE UP (ref 0.02–0.1)
PROCALCITONIN SERPL-MCNC: 0.17 NG/ML — HIGH (ref 0.02–0.1)
PROCALCITONIN SERPL-MCNC: 33.69 NG/ML — HIGH (ref 0.02–0.1)
PROCALCITONIN SERPL-MCNC: 4.48 NG/ML — HIGH (ref 0.02–0.1)
PROT ?TM UR-MCNC: 49 MG/DL — HIGH (ref 0–12)
PROT ?TM UR-MCNC: 49 MG/DL — HIGH (ref 0–12)
PROT PATTERN SERPL ELPH-IMP: SIGNIFICANT CHANGE UP
PROT SERPL-MCNC: 5.9 G/DL — LOW (ref 6–8.3)
PROT SERPL-MCNC: 6 G/DL — SIGNIFICANT CHANGE UP (ref 6–8.3)
PROT SERPL-MCNC: 6.2 G/DL — SIGNIFICANT CHANGE UP (ref 6–8.3)
PROT SERPL-MCNC: 6.3 G/DL — SIGNIFICANT CHANGE UP (ref 6–8.3)
PROT SERPL-MCNC: 6.4 G/DL — SIGNIFICANT CHANGE UP (ref 6–8.3)
PROT SERPL-MCNC: 6.4 G/DL — SIGNIFICANT CHANGE UP (ref 6–8.3)
PROT SERPL-MCNC: 6.5 G/DL — SIGNIFICANT CHANGE UP (ref 6–8.3)
PROT SERPL-MCNC: 6.6 G/DL — SIGNIFICANT CHANGE UP (ref 6–8.3)
PROT SERPL-MCNC: 6.7 G/DL — SIGNIFICANT CHANGE UP (ref 6–8.3)
PROT SERPL-MCNC: 6.8 G/DL — SIGNIFICANT CHANGE UP (ref 6–8.3)
PROT SERPL-MCNC: 6.9 G/DL — SIGNIFICANT CHANGE UP (ref 6–8.3)
PROT SERPL-MCNC: 7 G/DL — SIGNIFICANT CHANGE UP (ref 6–8.3)
PROT SERPL-MCNC: 7.1 G/DL — SIGNIFICANT CHANGE UP (ref 6–8.3)
PROT SERPL-MCNC: 7.2 G/DL — SIGNIFICANT CHANGE UP (ref 6–8.3)
PROT SERPL-MCNC: 7.3 G/DL — SIGNIFICANT CHANGE UP (ref 6–8.3)
PROT SERPL-MCNC: 7.4 G/DL — SIGNIFICANT CHANGE UP (ref 6–8.3)
PROT SERPL-MCNC: 7.5 G/DL — SIGNIFICANT CHANGE UP (ref 6–8.3)
PROT SERPL-MCNC: 7.6 G/DL — SIGNIFICANT CHANGE UP (ref 6–8.3)
PROT SERPL-MCNC: 7.7 G/DL — SIGNIFICANT CHANGE UP (ref 6–8.3)
PROT SERPL-MCNC: 7.8 G/DL — SIGNIFICANT CHANGE UP (ref 6–8.3)
PROT SERPL-MCNC: 7.9 G/DL — SIGNIFICANT CHANGE UP (ref 6–8.3)
PROT SERPL-MCNC: 8 G/DL — SIGNIFICANT CHANGE UP (ref 6–8.3)
PROT SERPL-MCNC: 8.1 G/DL — SIGNIFICANT CHANGE UP (ref 6–8.3)
PROT SERPL-MCNC: 8.2 G/DL — SIGNIFICANT CHANGE UP (ref 6–8.3)
PROT SERPL-MCNC: 8.3 G/DL — SIGNIFICANT CHANGE UP (ref 6–8.3)
PROT SERPL-MCNC: 8.4 G/DL — HIGH (ref 6–8.3)
PROT SERPL-MCNC: 8.5 G/DL — HIGH (ref 6–8.3)
PROT SERPL-MCNC: 8.6 G/DL — HIGH (ref 6–8.3)
PROT SERPL-MCNC: 8.7 G/DL — HIGH (ref 6–8.3)
PROT SERPL-MCNC: 8.7 G/DL — HIGH (ref 6–8.3)
PROT SERPL-MCNC: 8.8 G/DL — HIGH (ref 6–8.3)
PROT SERPL-MCNC: 8.9 G/DL — HIGH (ref 6–8.3)
PROT SERPL-MCNC: 9 G/DL — HIGH (ref 6–8.3)
PROT SERPL-MCNC: 9.1 G/DL — HIGH (ref 6–8.3)
PROT SERPL-MCNC: 9.2 G/DL — HIGH (ref 6–8.3)
PROT SERPL-MCNC: 9.2 G/DL — HIGH (ref 6–8.3)
PROT SERPL-MCNC: 9.3 G/DL — HIGH (ref 6–8.3)
PROT SERPL-MCNC: 9.4 G/DL — HIGH (ref 6–8.3)
PROT SERPL-MCNC: 9.5 G/DL — HIGH (ref 6–8.3)
PROT SERPL-MCNC: 9.6 G/DL — HIGH (ref 6–8.3)
PROT SERPL-MCNC: 9.7 G/DL — HIGH (ref 6–8.3)
PROT SERPL-MCNC: 9.8 G/DL — HIGH (ref 6–8.3)
PROT SERPL-MCNC: 9.8 G/DL — HIGH (ref 6–8.3)
PROT SERPL-MCNC: 9.9 G/DL — HIGH (ref 6–8.3)
PROT UR-MCNC: 100 — SIGNIFICANT CHANGE UP
PROT UR-MCNC: ABNORMAL
PROT UR-MCNC: NEGATIVE — SIGNIFICANT CHANGE UP
PROT UR-MCNC: SIGNIFICANT CHANGE UP
PROT/CREAT UR-RTO: 0.7 RATIO — HIGH (ref 0–0.2)
PROTHROM AB SERPL-ACNC: 11.5 SEC — SIGNIFICANT CHANGE UP (ref 10.6–13.6)
PROTHROM AB SERPL-ACNC: 12.7 SEC — SIGNIFICANT CHANGE UP (ref 10.6–13.6)
PROTHROM AB SERPL-ACNC: 12.7 SEC — SIGNIFICANT CHANGE UP (ref 10.6–13.6)
PROTHROM AB SERPL-ACNC: 13 SEC — SIGNIFICANT CHANGE UP (ref 10.6–13.6)
PROTHROM AB SERPL-ACNC: 13.1 SEC — SIGNIFICANT CHANGE UP (ref 10.6–13.6)
PROTHROM AB SERPL-ACNC: 13.2 SEC — SIGNIFICANT CHANGE UP (ref 10.6–13.6)
PROTHROM AB SERPL-ACNC: 13.5 SEC — SIGNIFICANT CHANGE UP (ref 10.6–13.6)
PROTHROM AB SERPL-ACNC: 13.6 SEC — SIGNIFICANT CHANGE UP (ref 10.6–13.6)
PROTHROM AB SERPL-ACNC: 13.7 SEC — HIGH (ref 10.6–13.6)
PROTHROM AB SERPL-ACNC: 13.7 SEC — HIGH (ref 10.6–13.6)
PROTHROM AB SERPL-ACNC: 13.9 SEC — HIGH (ref 10.6–13.6)
PROTHROM AB SERPL-ACNC: 14 SEC — HIGH (ref 10.6–13.6)
PROTHROM AB SERPL-ACNC: 14.1 SEC — HIGH (ref 10.6–13.6)
PROTHROM AB SERPL-ACNC: 14.2 SEC — HIGH (ref 10.6–13.6)
PROTHROM AB SERPL-ACNC: 14.3 SEC — HIGH (ref 10.6–13.6)
PROTHROM AB SERPL-ACNC: 14.4 SEC — HIGH (ref 10.6–13.6)
PROTHROM AB SERPL-ACNC: 14.6 SEC — HIGH (ref 10.6–13.6)
PROTHROM AB SERPL-ACNC: 14.7 SEC — HIGH (ref 10.6–13.6)
PROTHROM AB SERPL-ACNC: 14.8 SEC — HIGH (ref 10.6–13.6)
PROTHROM AB SERPL-ACNC: 14.9 SEC — HIGH (ref 10.6–13.6)
PROTHROM AB SERPL-ACNC: 15.1 SEC — HIGH (ref 10.6–13.6)
PROTHROM AB SERPL-ACNC: 15.2 SEC — HIGH (ref 10.6–13.6)
PROTHROM AB SERPL-ACNC: 15.3 SEC — HIGH (ref 10.6–13.6)
PROTHROM AB SERPL-ACNC: 15.3 SEC — HIGH (ref 10.6–13.6)
PROTHROM AB SERPL-ACNC: 15.4 SEC — HIGH (ref 10.6–13.6)
PROTHROM AB SERPL-ACNC: 15.4 SEC — HIGH (ref 10.6–13.6)
PROTHROM AB SERPL-ACNC: 15.6 SEC — HIGH (ref 10.6–13.6)
PROTHROM AB SERPL-ACNC: 15.6 SEC — HIGH (ref 10.6–13.6)
PROTHROM AB SERPL-ACNC: 15.8 SEC — HIGH (ref 10.6–13.6)
PROTHROM AB SERPL-ACNC: 16.3 SEC — HIGH (ref 10.6–13.6)
PROTHROM AB SERPL-ACNC: 16.8 SEC — HIGH (ref 10.6–13.6)
PROTHROM AB SERPL-ACNC: 17.4 SEC — HIGH (ref 10.6–13.6)
PROTHROM AB SERPL-ACNC: 17.4 SEC — HIGH (ref 10.6–13.6)
PROTHROM AB SERPL-ACNC: 17.7 SEC — HIGH (ref 10.6–13.6)
PROTHROM AB SERPL-ACNC: 18.5 SEC — HIGH (ref 10.6–13.6)
PROTHROM AB SERPL-ACNC: 18.6 SEC — HIGH (ref 10.6–13.6)
PROTHROM AB SERPL-ACNC: 18.7 SEC — HIGH (ref 10.6–13.6)
PROTHROM AB SERPL-ACNC: 18.8 SEC — HIGH (ref 10.6–13.6)
PROTHROM AB SERPL-ACNC: 20.2 SEC — HIGH (ref 10.6–13.6)
PROTHROM AB SERPL-ACNC: 20.9 SEC — HIGH (ref 10.6–13.6)
PROTHROM AB SERPL-ACNC: 20.9 SEC — HIGH (ref 10.6–13.6)
PROTHROM AB SERPL-ACNC: 21 SEC — HIGH (ref 10.6–13.6)
PROTHROM AB SERPL-ACNC: 22.6 SEC — HIGH (ref 10.6–13.6)
PROTHROM AB SERPL-ACNC: 22.7 SEC — HIGH (ref 10.6–13.6)
PROTHROM AB SERPL-ACNC: 22.8 SEC — HIGH (ref 10.6–13.6)
PROTHROM AB SERPL-ACNC: 22.8 SEC — HIGH (ref 10.6–13.6)
PROTHROM AB SERPL-ACNC: 24.1 SEC — HIGH (ref 10.6–13.6)
PROTHROM AB SERPL-ACNC: 25.8 SEC — HIGH (ref 10.6–13.6)
PROTHROM AB SERPL-ACNC: 26.2 SEC — HIGH (ref 10.6–13.6)
PROTHROM AB SERPL-ACNC: 26.7 SEC — HIGH (ref 10.6–13.6)
PROTHROM AB SERPL-ACNC: 27.7 SEC — HIGH (ref 10.6–13.6)
PROTHROM AB SERPL-ACNC: 28.4 SEC — HIGH (ref 10.6–13.6)
PROTHROM AB SERPL-ACNC: 30.2 SEC — HIGH (ref 10.6–13.6)
PROTHROM AB SERPL-ACNC: 30.6 SEC — HIGH (ref 10.6–13.6)
PROTHROM AB SERPL-ACNC: 32.3 SEC — HIGH (ref 10.6–13.6)
PROTHROM AB SERPL-ACNC: 32.4 SEC — HIGH (ref 10.6–13.6)
PROTHROM AB SERPL-ACNC: 37.6 SEC — HIGH (ref 10.6–13.6)
PROTHROM AB SERPL-ACNC: 95.6 SEC — HIGH (ref 10.6–13.6)
QUANT TB PLUS MITOGEN MINUS NIL: 0.03 IU/ML — SIGNIFICANT CHANGE UP
RAPID RVP RESULT: SIGNIFICANT CHANGE UP
RBC # BLD: 2.07 M/UL — LOW (ref 4.2–5.8)
RBC # BLD: 2.12 M/UL — LOW (ref 4.2–5.8)
RBC # BLD: 2.33 M/UL — LOW (ref 4.2–5.8)
RBC # BLD: 2.37 M/UL — LOW (ref 4.2–5.8)
RBC # BLD: 2.43 M/UL — LOW (ref 4.2–5.8)
RBC # BLD: 2.46 M/UL — LOW (ref 4.2–5.8)
RBC # BLD: 2.47 M/UL — LOW (ref 4.2–5.8)
RBC # BLD: 2.57 M/UL — LOW (ref 4.2–5.8)
RBC # BLD: 2.58 M/UL — LOW (ref 4.2–5.8)
RBC # BLD: 2.61 M/UL — LOW (ref 4.2–5.8)
RBC # BLD: 2.61 M/UL — LOW (ref 4.2–5.8)
RBC # BLD: 2.62 M/UL — LOW (ref 4.2–5.8)
RBC # BLD: 2.63 M/UL — LOW (ref 4.2–5.8)
RBC # BLD: 2.63 M/UL — LOW (ref 4.2–5.8)
RBC # BLD: 2.65 M/UL — LOW (ref 4.2–5.8)
RBC # BLD: 2.66 M/UL — LOW (ref 4.2–5.8)
RBC # BLD: 2.67 M/UL — LOW (ref 4.2–5.8)
RBC # BLD: 2.7 M/UL — LOW (ref 4.2–5.8)
RBC # BLD: 2.71 M/UL — LOW (ref 4.2–5.8)
RBC # BLD: 2.71 M/UL — LOW (ref 4.2–5.8)
RBC # BLD: 2.73 M/UL — LOW (ref 4.2–5.8)
RBC # BLD: 2.74 M/UL — LOW (ref 4.2–5.8)
RBC # BLD: 2.74 M/UL — LOW (ref 4.2–5.8)
RBC # BLD: 2.77 M/UL — LOW (ref 4.2–5.8)
RBC # BLD: 2.77 M/UL — LOW (ref 4.2–5.8)
RBC # BLD: 2.78 M/UL — LOW (ref 4.2–5.8)
RBC # BLD: 2.78 M/UL — LOW (ref 4.2–5.8)
RBC # BLD: 2.79 M/UL — LOW (ref 4.2–5.8)
RBC # BLD: 2.79 M/UL — LOW (ref 4.2–5.8)
RBC # BLD: 2.8 M/UL — LOW (ref 4.2–5.8)
RBC # BLD: 2.8 M/UL — LOW (ref 4.2–5.8)
RBC # BLD: 2.81 M/UL — LOW (ref 4.2–5.8)
RBC # BLD: 2.82 M/UL — LOW (ref 4.2–5.8)
RBC # BLD: 2.82 M/UL — LOW (ref 4.2–5.8)
RBC # BLD: 2.85 M/UL — LOW (ref 4.2–5.8)
RBC # BLD: 2.85 M/UL — LOW (ref 4.2–5.8)
RBC # BLD: 2.86 M/UL — LOW (ref 4.2–5.8)
RBC # BLD: 2.87 M/UL — LOW (ref 4.2–5.8)
RBC # BLD: 2.87 M/UL — LOW (ref 4.2–5.8)
RBC # BLD: 2.89 M/UL — LOW (ref 4.2–5.8)
RBC # BLD: 2.9 M/UL — LOW (ref 4.2–5.8)
RBC # BLD: 2.91 M/UL — LOW (ref 4.2–5.8)
RBC # BLD: 2.91 M/UL — LOW (ref 4.2–5.8)
RBC # BLD: 2.92 M/UL — LOW (ref 4.2–5.8)
RBC # BLD: 2.93 M/UL — LOW (ref 4.2–5.8)
RBC # BLD: 2.94 M/UL — LOW (ref 4.2–5.8)
RBC # BLD: 2.95 M/UL — LOW (ref 4.2–5.8)
RBC # BLD: 2.96 M/UL — LOW (ref 4.2–5.8)
RBC # BLD: 2.97 M/UL — LOW (ref 4.2–5.8)
RBC # BLD: 2.98 M/UL — LOW (ref 4.2–5.8)
RBC # BLD: 2.99 M/UL — LOW (ref 4.2–5.8)
RBC # BLD: 2.99 M/UL — LOW (ref 4.2–5.8)
RBC # BLD: 3 M/UL — LOW (ref 4.2–5.8)
RBC # BLD: 3 M/UL — LOW (ref 4.2–5.8)
RBC # BLD: 3.01 M/UL — LOW (ref 4.2–5.8)
RBC # BLD: 3.02 M/UL — LOW (ref 4.2–5.8)
RBC # BLD: 3.02 M/UL — LOW (ref 4.2–5.8)
RBC # BLD: 3.03 M/UL — LOW (ref 4.2–5.8)
RBC # BLD: 3.03 M/UL — LOW (ref 4.2–5.8)
RBC # BLD: 3.04 M/UL — LOW (ref 4.2–5.8)
RBC # BLD: 3.05 M/UL — LOW (ref 4.2–5.8)
RBC # BLD: 3.06 M/UL — LOW (ref 4.2–5.8)
RBC # BLD: 3.07 M/UL — LOW (ref 4.2–5.8)
RBC # BLD: 3.07 M/UL — LOW (ref 4.2–5.8)
RBC # BLD: 3.08 M/UL — LOW (ref 4.2–5.8)
RBC # BLD: 3.09 M/UL — LOW (ref 4.2–5.8)
RBC # BLD: 3.1 M/UL — LOW (ref 4.2–5.8)
RBC # BLD: 3.11 M/UL — LOW (ref 4.2–5.8)
RBC # BLD: 3.11 M/UL — LOW (ref 4.2–5.8)
RBC # BLD: 3.12 M/UL — LOW (ref 4.2–5.8)
RBC # BLD: 3.13 M/UL — LOW (ref 4.2–5.8)
RBC # BLD: 3.15 M/UL — LOW (ref 4.2–5.8)
RBC # BLD: 3.16 M/UL — LOW (ref 4.2–5.8)
RBC # BLD: 3.18 M/UL — LOW (ref 4.2–5.8)
RBC # BLD: 3.2 M/UL — LOW (ref 4.2–5.8)
RBC # BLD: 3.21 M/UL — LOW (ref 4.2–5.8)
RBC # BLD: 3.22 M/UL — LOW (ref 4.2–5.8)
RBC # BLD: 3.23 M/UL — LOW (ref 4.2–5.8)
RBC # BLD: 3.24 M/UL — LOW (ref 4.2–5.8)
RBC # BLD: 3.25 M/UL — LOW (ref 4.2–5.8)
RBC # BLD: 3.26 M/UL — LOW (ref 4.2–5.8)
RBC # BLD: 3.26 M/UL — LOW (ref 4.2–5.8)
RBC # BLD: 3.27 M/UL — LOW (ref 4.2–5.8)
RBC # BLD: 3.28 M/UL — LOW (ref 4.2–5.8)
RBC # BLD: 3.28 M/UL — LOW (ref 4.2–5.8)
RBC # BLD: 3.29 M/UL — LOW (ref 4.2–5.8)
RBC # BLD: 3.3 M/UL — LOW (ref 4.2–5.8)
RBC # BLD: 3.3 M/UL — LOW (ref 4.2–5.8)
RBC # BLD: 3.31 M/UL — LOW (ref 4.2–5.8)
RBC # BLD: 3.32 M/UL — LOW (ref 4.2–5.8)
RBC # BLD: 3.34 M/UL — LOW (ref 4.2–5.8)
RBC # BLD: 3.34 M/UL — LOW (ref 4.2–5.8)
RBC # BLD: 3.35 M/UL — LOW (ref 4.2–5.8)
RBC # BLD: 3.36 M/UL — LOW (ref 4.2–5.8)
RBC # BLD: 3.37 M/UL — LOW (ref 4.2–5.8)
RBC # BLD: 3.39 M/UL — LOW (ref 4.2–5.8)
RBC # BLD: 3.39 M/UL — LOW (ref 4.2–5.8)
RBC # BLD: 3.4 M/UL — LOW (ref 4.2–5.8)
RBC # BLD: 3.4 M/UL — LOW (ref 4.2–5.8)
RBC # BLD: 3.44 M/UL — LOW (ref 4.2–5.8)
RBC # BLD: 3.45 M/UL — LOW (ref 4.2–5.8)
RBC # BLD: 3.46 M/UL — LOW (ref 4.2–5.8)
RBC # BLD: 3.47 M/UL — LOW (ref 4.2–5.8)
RBC # BLD: 3.49 M/UL — LOW (ref 4.2–5.8)
RBC # BLD: 3.5 M/UL — LOW (ref 4.2–5.8)
RBC # BLD: 3.52 M/UL — LOW (ref 4.2–5.8)
RBC # BLD: 3.53 M/UL — LOW (ref 4.2–5.8)
RBC # BLD: 3.53 M/UL — LOW (ref 4.2–5.8)
RBC # BLD: 3.54 M/UL — LOW (ref 4.2–5.8)
RBC # BLD: 3.55 M/UL — LOW (ref 4.2–5.8)
RBC # BLD: 3.56 M/UL — LOW (ref 4.2–5.8)
RBC # BLD: 3.57 M/UL — LOW (ref 4.2–5.8)
RBC # BLD: 3.58 M/UL — LOW (ref 4.2–5.8)
RBC # BLD: 3.58 M/UL — LOW (ref 4.2–5.8)
RBC # BLD: 3.59 M/UL — LOW (ref 4.2–5.8)
RBC # BLD: 3.62 M/UL — LOW (ref 4.2–5.8)
RBC # BLD: 3.63 M/UL — LOW (ref 4.2–5.8)
RBC # BLD: 3.65 M/UL — LOW (ref 4.2–5.8)
RBC # BLD: 3.69 M/UL — LOW (ref 4.2–5.8)
RBC # BLD: 3.69 M/UL — LOW (ref 4.2–5.8)
RBC # BLD: 3.7 M/UL — LOW (ref 4.2–5.8)
RBC # BLD: 3.71 M/UL — LOW (ref 4.2–5.8)
RBC # BLD: 3.73 M/UL — LOW (ref 4.2–5.8)
RBC # BLD: 3.73 M/UL — LOW (ref 4.2–5.8)
RBC # BLD: 3.74 M/UL — LOW (ref 4.2–5.8)
RBC # BLD: 3.76 M/UL — LOW (ref 4.2–5.8)
RBC # BLD: 3.8 M/UL — LOW (ref 4.2–5.8)
RBC # BLD: 3.81 M/UL — LOW (ref 4.2–5.8)
RBC # BLD: 3.82 M/UL — LOW (ref 4.2–5.8)
RBC # BLD: 3.83 M/UL — LOW (ref 4.2–5.8)
RBC # BLD: 3.85 M/UL — LOW (ref 4.2–5.8)
RBC # BLD: 3.87 M/UL — LOW (ref 4.2–5.8)
RBC # BLD: 3.88 M/UL — LOW (ref 4.2–5.8)
RBC # BLD: 3.89 M/UL — LOW (ref 4.2–5.8)
RBC # BLD: 3.9 M/UL — LOW (ref 4.2–5.8)
RBC # BLD: 3.9 M/UL — LOW (ref 4.2–5.8)
RBC # BLD: 3.91 M/UL — LOW (ref 4.2–5.8)
RBC # BLD: 3.96 M/UL — LOW (ref 4.2–5.8)
RBC # BLD: 3.97 M/UL — LOW (ref 4.2–5.8)
RBC # BLD: 3.99 M/UL — LOW (ref 4.2–5.8)
RBC # BLD: 4 M/UL — LOW (ref 4.2–5.8)
RBC # BLD: 4.01 M/UL — LOW (ref 4.2–5.8)
RBC # BLD: 4.01 M/UL — LOW (ref 4.2–5.8)
RBC # BLD: 4.13 M/UL — LOW (ref 4.2–5.8)
RBC # BLD: 4.17 M/UL — LOW (ref 4.2–5.8)
RBC # BLD: 4.17 M/UL — LOW (ref 4.2–5.8)
RBC # BLD: 4.45 M/UL — SIGNIFICANT CHANGE UP (ref 4.2–5.8)
RBC # BLD: 4.79 M/UL — SIGNIFICANT CHANGE UP (ref 4.2–5.8)
RBC # FLD: 13.7 % — SIGNIFICANT CHANGE UP (ref 10.3–14.5)
RBC # FLD: 13.9 % — SIGNIFICANT CHANGE UP (ref 10.3–14.5)
RBC # FLD: 13.9 % — SIGNIFICANT CHANGE UP (ref 10.3–14.5)
RBC # FLD: 14 % — SIGNIFICANT CHANGE UP (ref 10.3–14.5)
RBC # FLD: 14.1 % — SIGNIFICANT CHANGE UP (ref 10.3–14.5)
RBC # FLD: 14.2 % — SIGNIFICANT CHANGE UP (ref 10.3–14.5)
RBC # FLD: 14.2 % — SIGNIFICANT CHANGE UP (ref 10.3–14.5)
RBC # FLD: 14.3 % — SIGNIFICANT CHANGE UP (ref 10.3–14.5)
RBC # FLD: 14.4 % — SIGNIFICANT CHANGE UP (ref 10.3–14.5)
RBC # FLD: 14.5 % — SIGNIFICANT CHANGE UP (ref 10.3–14.5)
RBC # FLD: 14.6 % — HIGH (ref 10.3–14.5)
RBC # FLD: 14.7 % — HIGH (ref 10.3–14.5)
RBC # FLD: 14.8 % — HIGH (ref 10.3–14.5)
RBC # FLD: 14.8 % — HIGH (ref 10.3–14.5)
RBC # FLD: 14.9 % — HIGH (ref 10.3–14.5)
RBC # FLD: 14.9 % — HIGH (ref 10.3–14.5)
RBC # FLD: 15 % — HIGH (ref 10.3–14.5)
RBC # FLD: 15.1 % — HIGH (ref 10.3–14.5)
RBC # FLD: 15.2 % — HIGH (ref 10.3–14.5)
RBC # FLD: 15.3 % — HIGH (ref 10.3–14.5)
RBC # FLD: 15.4 % — HIGH (ref 10.3–14.5)
RBC # FLD: 15.5 % — HIGH (ref 10.3–14.5)
RBC # FLD: 15.6 % — HIGH (ref 10.3–14.5)
RBC # FLD: 15.7 % — HIGH (ref 10.3–14.5)
RBC # FLD: 15.8 % — HIGH (ref 10.3–14.5)
RBC # FLD: 15.8 % — HIGH (ref 10.3–14.5)
RBC # FLD: 15.9 % — HIGH (ref 10.3–14.5)
RBC # FLD: 16 % — HIGH (ref 10.3–14.5)
RBC # FLD: 16.1 % — HIGH (ref 10.3–14.5)
RBC # FLD: 16.2 % — HIGH (ref 10.3–14.5)
RBC # FLD: 16.2 % — HIGH (ref 10.3–14.5)
RBC # FLD: 16.4 % — HIGH (ref 10.3–14.5)
RBC # FLD: 16.5 % — HIGH (ref 10.3–14.5)
RBC # FLD: 16.6 % — HIGH (ref 10.3–14.5)
RBC # FLD: 16.7 % — HIGH (ref 10.3–14.5)
RBC # FLD: 16.8 % — HIGH (ref 10.3–14.5)
RBC # FLD: 16.9 % — HIGH (ref 10.3–14.5)
RBC # FLD: 16.9 % — HIGH (ref 10.3–14.5)
RBC # FLD: 17 % — HIGH (ref 10.3–14.5)
RBC # FLD: 17 % — HIGH (ref 10.3–14.5)
RBC # FLD: 17.1 % — HIGH (ref 10.3–14.5)
RBC # FLD: 17.1 % — HIGH (ref 10.3–14.5)
RBC # FLD: 17.2 % — HIGH (ref 10.3–14.5)
RBC # FLD: 17.3 % — HIGH (ref 10.3–14.5)
RBC # FLD: 17.4 % — HIGH (ref 10.3–14.5)
RBC # FLD: 17.5 % — HIGH (ref 10.3–14.5)
RBC # FLD: 17.7 % — HIGH (ref 10.3–14.5)
RBC # FLD: 17.7 % — HIGH (ref 10.3–14.5)
RBC # FLD: 17.8 % — HIGH (ref 10.3–14.5)
RBC # FLD: 17.8 % — HIGH (ref 10.3–14.5)
RBC # FLD: 18 % — HIGH (ref 10.3–14.5)
RBC # FLD: 18.1 % — HIGH (ref 10.3–14.5)
RBC # FLD: 18.1 % — HIGH (ref 10.3–14.5)
RBC # FLD: 18.2 % — HIGH (ref 10.3–14.5)
RBC # FLD: 18.3 % — HIGH (ref 10.3–14.5)
RBC # FLD: 18.5 % — HIGH (ref 10.3–14.5)
RBC # FLD: 18.6 % — HIGH (ref 10.3–14.5)
RBC # FLD: 18.7 % — HIGH (ref 10.3–14.5)
RBC # FLD: 18.7 % — HIGH (ref 10.3–14.5)
RBC # FLD: 18.9 % — HIGH (ref 10.3–14.5)
RBC # FLD: 19 % — HIGH (ref 10.3–14.5)
RBC # FLD: 19.1 % — HIGH (ref 10.3–14.5)
RBC # FLD: 19.2 % — HIGH (ref 10.3–14.5)
RBC # FLD: 19.3 % — HIGH (ref 10.3–14.5)
RBC # FLD: 19.4 % — HIGH (ref 10.3–14.5)
RBC # FLD: 19.4 % — HIGH (ref 10.3–14.5)
RBC # FLD: 19.6 % — HIGH (ref 10.3–14.5)
RBC # FLD: 19.8 % — HIGH (ref 10.3–14.5)
RBC # FLD: 19.9 % — HIGH (ref 10.3–14.5)
RBC # FLD: 20 % — HIGH (ref 10.3–14.5)
RBC # FLD: 20.2 % — HIGH (ref 10.3–14.5)
RBC # FLD: 20.4 % — HIGH (ref 10.3–14.5)
RBC # FLD: 20.4 % — HIGH (ref 10.3–14.5)
RBC # FLD: 20.5 % — HIGH (ref 10.3–14.5)
RBC # FLD: 20.5 % — HIGH (ref 10.3–14.5)
RBC # FLD: 20.6 % — HIGH (ref 10.3–14.5)
RBC # FLD: 20.6 % — HIGH (ref 10.3–14.5)
RBC # FLD: 20.7 % — HIGH (ref 10.3–14.5)
RBC # FLD: 20.9 % — HIGH (ref 10.3–14.5)
RBC # FLD: 20.9 % — HIGH (ref 10.3–14.5)
RBC # FLD: 21 % — HIGH (ref 10.3–14.5)
RBC # FLD: 21.3 % — HIGH (ref 10.3–14.5)
RBC # FLD: 21.4 % — HIGH (ref 10.3–14.5)
RBC # FLD: 21.4 % — HIGH (ref 10.3–14.5)
RBC # FLD: 21.9 % — HIGH (ref 10.3–14.5)
RBC # FLD: 22 % — HIGH (ref 10.3–14.5)
RBC # FLD: 22.1 % — HIGH (ref 10.3–14.5)
RBC # FLD: 22.5 % — HIGH (ref 10.3–14.5)
RBC # FLD: 22.6 % — HIGH (ref 10.3–14.5)
RBC BLD AUTO: ABNORMAL
RBC CASTS # UR COMP ASSIST: 1 /HPF — SIGNIFICANT CHANGE UP (ref 0–4)
RBC CASTS # UR COMP ASSIST: 110 /HPF — HIGH (ref 0–4)
RBC CASTS # UR COMP ASSIST: 140 /HPF — HIGH (ref 0–4)
RBC CASTS # UR COMP ASSIST: 3 /HPF — SIGNIFICANT CHANGE UP (ref 0–4)
RBC CASTS # UR COMP ASSIST: 4 /HPF — SIGNIFICANT CHANGE UP (ref 0–4)
RETICS #: 53.4 K/UL — SIGNIFICANT CHANGE UP (ref 25–125)
RETICS/RBC NFR: 1.7 % — SIGNIFICANT CHANGE UP (ref 0.5–2.5)
RF+CCP IGG SER-IMP: NEGATIVE — SIGNIFICANT CHANGE UP
RH IG SCN BLD-IMP: POSITIVE — SIGNIFICANT CHANGE UP
RHEUMATOID FACT SERPL-ACNC: 19 IU/ML — HIGH (ref 0–13)
S AUREUS DNA NOSE QL NAA+PROBE: SIGNIFICANT CHANGE UP
S MARCESCENS DNA BLD POS NAA+NON-PROBE: SIGNIFICANT CHANGE UP
SAO2 % BLDA: 99.8 % — HIGH (ref 94–98)
SAO2 % BLDMV: 62.7 — SIGNIFICANT CHANGE UP (ref 60–90)
SAO2 % BLDMV: 64.8 — SIGNIFICANT CHANGE UP (ref 60–90)
SAO2 % BLDMV: 65.8 — SIGNIFICANT CHANGE UP (ref 60–90)
SAO2 % BLDV: 16 % — LOW (ref 67–88)
SAO2 % BLDV: 50 % — LOW (ref 67–88)
SAO2 % BLDV: 52 % — LOW (ref 67–88)
SAO2 % BLDV: 56 % — LOW (ref 67–88)
SAO2 % BLDV: 58 % — LOW (ref 67–88)
SAO2 % BLDV: 61.5 % — LOW (ref 67–88)
SAO2 % BLDV: 62.4 % — LOW (ref 67–88)
SAO2 % BLDV: 64 % — LOW (ref 67–88)
SAO2 % BLDV: 65 % — LOW (ref 67–88)
SAO2 % BLDV: 66 % — LOW (ref 67–88)
SAO2 % BLDV: 66 % — LOW (ref 67–88)
SAO2 % BLDV: 68 % — SIGNIFICANT CHANGE UP (ref 67–88)
SAO2 % BLDV: 68 % — SIGNIFICANT CHANGE UP (ref 67–88)
SAO2 % BLDV: 69 % — SIGNIFICANT CHANGE UP (ref 67–88)
SAO2 % BLDV: 74.9 % — SIGNIFICANT CHANGE UP (ref 67–88)
SAO2 % BLDV: 78.4 % — SIGNIFICANT CHANGE UP (ref 67–88)
SAO2 % BLDV: 80 % — SIGNIFICANT CHANGE UP (ref 67–88)
SAO2 % BLDV: 80.9 % — SIGNIFICANT CHANGE UP (ref 67–88)
SAO2 % BLDV: 84 % — SIGNIFICANT CHANGE UP (ref 67–88)
SAO2 % BLDV: 89 % — HIGH (ref 67–88)
SAO2 % BLDV: 93.5 % — HIGH (ref 67–88)
SARS-COV-2 IGG+IGM SERPL QL IA: 88.4 INDEX — HIGH
SARS-COV-2 IGG+IGM SERPL QL IA: >250 U/ML — HIGH
SARS-COV-2 IGG+IGM SERPL QL IA: >250 U/ML — HIGH
SARS-COV-2 IGG+IGM SERPL QL IA: POSITIVE
SARS-COV-2 RNA SPEC QL NAA+PROBE: DETECTED
SARS-COV-2 RNA SPEC QL NAA+PROBE: SIGNIFICANT CHANGE UP
SCHISTOCYTES BLD QL AUTO: SLIGHT — SIGNIFICANT CHANGE UP
SODIUM SERPL-SCNC: 124 MMOL/L — LOW (ref 135–145)
SODIUM SERPL-SCNC: 127 MMOL/L — LOW (ref 135–145)
SODIUM SERPL-SCNC: 128 MMOL/L — LOW (ref 135–145)
SODIUM SERPL-SCNC: 129 MMOL/L — LOW (ref 135–145)
SODIUM SERPL-SCNC: 130 MMOL/L — LOW (ref 135–145)
SODIUM SERPL-SCNC: 131 MMOL/L — LOW (ref 135–145)
SODIUM SERPL-SCNC: 132 MMOL/L — LOW (ref 135–145)
SODIUM SERPL-SCNC: 133 MMOL/L — LOW (ref 135–145)
SODIUM SERPL-SCNC: 134 MMOL/L — LOW (ref 135–145)
SODIUM SERPL-SCNC: 135 MMOL/L — SIGNIFICANT CHANGE UP (ref 135–145)
SODIUM SERPL-SCNC: 136 MMOL/L — SIGNIFICANT CHANGE UP (ref 135–145)
SODIUM SERPL-SCNC: 137 MMOL/L — SIGNIFICANT CHANGE UP (ref 135–145)
SODIUM SERPL-SCNC: 138 MMOL/L — SIGNIFICANT CHANGE UP (ref 135–145)
SODIUM SERPL-SCNC: 139 MMOL/L — SIGNIFICANT CHANGE UP (ref 135–145)
SODIUM SERPL-SCNC: 140 MMOL/L — SIGNIFICANT CHANGE UP (ref 135–145)
SODIUM SERPL-SCNC: 142 MMOL/L — SIGNIFICANT CHANGE UP (ref 135–145)
SODIUM SERPL-SCNC: 144 MMOL/L — SIGNIFICANT CHANGE UP (ref 135–145)
SODIUM SERPL-SCNC: 145 MMOL/L — SIGNIFICANT CHANGE UP (ref 135–145)
SODIUM SERPL-SCNC: 146 MMOL/L — HIGH (ref 135–145)
SODIUM SERPL-SCNC: 146 MMOL/L — HIGH (ref 135–145)
SODIUM UR-SCNC: 154 MMOL/L — SIGNIFICANT CHANGE UP
SODIUM UR-SCNC: 70 MMOL/L — SIGNIFICANT CHANGE UP
SP GR SPEC: 1.01 — LOW (ref 1.01–1.02)
SP GR SPEC: 1.01 — SIGNIFICANT CHANGE UP (ref 1.01–1.02)
SP GR SPEC: 1.02 — SIGNIFICANT CHANGE UP (ref 1.01–1.02)
SP GR SPEC: 1.03 — HIGH (ref 1.01–1.02)
SP GR SPEC: 1.04 — HIGH (ref 1.01–1.02)
SP GR SPEC: 1.04 — HIGH (ref 1.01–1.02)
SPECIMEN SOURCE: SIGNIFICANT CHANGE UP
STRONGYLOIDES AB SER-ACNC: NEGATIVE — SIGNIFICANT CHANGE UP
T PALLIDUM AB TITR SER: NEGATIVE — SIGNIFICANT CHANGE UP
T3 SERPL-MCNC: 101 NG/DL — SIGNIFICANT CHANGE UP (ref 80–200)
T3 SERPL-MCNC: 103 NG/DL — SIGNIFICANT CHANGE UP (ref 80–200)
T3 SERPL-MCNC: 109 NG/DL — SIGNIFICANT CHANGE UP (ref 80–200)
T3 SERPL-MCNC: 110 NG/DL — SIGNIFICANT CHANGE UP (ref 80–200)
T3 SERPL-MCNC: 111 NG/DL — SIGNIFICANT CHANGE UP (ref 80–200)
T3 SERPL-MCNC: 126 NG/DL — SIGNIFICANT CHANGE UP (ref 80–200)
T3 SERPL-MCNC: 131 NG/DL — SIGNIFICANT CHANGE UP
T3 SERPL-MCNC: 133 NG/DL — SIGNIFICANT CHANGE UP (ref 80–200)
T3 SERPL-MCNC: 152 NG/DL — SIGNIFICANT CHANGE UP (ref 80–200)
T3 SERPL-MCNC: 165 NG/DL — SIGNIFICANT CHANGE UP (ref 80–200)
T3 SERPL-MCNC: 171 NG/DL — SIGNIFICANT CHANGE UP (ref 80–200)
T3 SERPL-MCNC: 206 NG/DL — HIGH (ref 80–200)
T3 SERPL-MCNC: 208 NG/DL — HIGH (ref 80–200)
T3 SERPL-MCNC: 219 NG/DL — HIGH (ref 80–200)
T3 SERPL-MCNC: 274 NG/DL — HIGH (ref 80–200)
T3 SERPL-MCNC: 276 NG/DL — HIGH (ref 80–200)
T3 SERPL-MCNC: 343 NG/DL — HIGH (ref 80–200)
T3 SERPL-MCNC: 42 NG/DL — LOW (ref 80–200)
T3 SERPL-MCNC: 54 NG/DL — LOW (ref 80–200)
T3 SERPL-MCNC: 67 NG/DL — LOW (ref 80–200)
T3 SERPL-MCNC: 68 NG/DL — LOW (ref 80–200)
T3 SERPL-MCNC: 81 NG/DL — SIGNIFICANT CHANGE UP (ref 80–200)
T3 SERPL-MCNC: 84 NG/DL — SIGNIFICANT CHANGE UP (ref 80–200)
T3 SERPL-MCNC: 90 NG/DL — SIGNIFICANT CHANGE UP (ref 80–200)
T3 SERPL-MCNC: 92 NG/DL — SIGNIFICANT CHANGE UP (ref 80–200)
T3 SERPL-MCNC: 98 NG/DL — SIGNIFICANT CHANGE UP (ref 80–200)
T3FREE SERPL-MCNC: 3.4 PG/ML — SIGNIFICANT CHANGE UP (ref 1.8–4.6)
T3FREE SERPL-MCNC: 9.59 PG/ML — HIGH (ref 1.8–4.6)
T4 AB SER-ACNC: 11.7 UG/DL — SIGNIFICANT CHANGE UP (ref 4.6–12)
T4 AB SER-ACNC: 18.2 UG/DL — HIGH (ref 4.6–12)
T4 AB SER-ACNC: 24.5 UG/DL — HIGH (ref 4.6–12)
T4 AB SER-ACNC: 24.9 UG/DL — HIGH (ref 4.6–12)
T4 AB SER-ACNC: 7.5 UG/DL — SIGNIFICANT CHANGE UP (ref 4.6–12)
T4 AB SER-ACNC: 8.3 UG/DL — SIGNIFICANT CHANGE UP (ref 4.6–12)
T4 AB SER-ACNC: 8.3 UG/DL — SIGNIFICANT CHANGE UP (ref 4.6–12)
T4 AB SER-ACNC: 9.8 UG/DL — SIGNIFICANT CHANGE UP (ref 4.6–12)
T4 FREE SERPL DIALY-MCNC: 2.7 NG/DL — HIGH
T4 FREE SERPL DIALY-MCNC: 4.5 NG/DL — HIGH
T4 FREE SERPL-MCNC: 1.2 NG/DL — SIGNIFICANT CHANGE UP (ref 0.9–1.8)
T4 FREE SERPL-MCNC: 1.3 NG/DL — SIGNIFICANT CHANGE UP (ref 0.9–1.8)
T4 FREE SERPL-MCNC: 1.4 NG/DL — SIGNIFICANT CHANGE UP (ref 0.9–1.8)
T4 FREE SERPL-MCNC: 1.7 NG/DL — SIGNIFICANT CHANGE UP (ref 0.9–1.8)
T4 FREE SERPL-MCNC: 2.5 NG/DL — HIGH (ref 0.9–1.8)
T4 FREE SERPL-MCNC: 3.1 NG/DL — HIGH (ref 0.9–1.8)
T4 FREE SERPL-MCNC: 3.2 NG/DL — HIGH (ref 0.9–1.8)
T4 FREE SERPL-MCNC: 3.4 NG/DL — HIGH (ref 0.9–1.8)
T4 FREE SERPL-MCNC: 3.4 NG/DL — HIGH (ref 0.9–1.8)
T4 FREE SERPL-MCNC: 3.5 NG/DL — HIGH (ref 0.9–1.8)
T4 FREE SERPL-MCNC: 4 NG/DL — HIGH (ref 0.9–1.8)
T4 FREE SERPL-MCNC: 4 NG/DL — HIGH (ref 0.9–1.8)
T4 FREE SERPL-MCNC: 4.2 NG/DL — HIGH (ref 0.9–1.8)
T4 FREE SERPL-MCNC: 4.9 NG/DL — HIGH (ref 0.9–1.8)
T4 FREE SERPL-MCNC: 5 NG/DL — HIGH (ref 0.9–1.8)
T4 FREE SERPL-MCNC: 5.2 NG/DL — HIGH (ref 0.9–1.8)
T4 FREE SERPL-MCNC: 5.3 NG/DL — HIGH (ref 0.9–1.8)
T4 FREE SERPL-MCNC: 6.4 NG/DL — HIGH (ref 0.9–1.8)
T4 FREE SERPL-MCNC: >7.8 NG/DL — HIGH (ref 0.9–1.8)
THYROPEROXIDASE AB SERPL-ACNC: 35.9 IU/ML — HIGH
THYROPEROXIDASE AB SERPL-ACNC: 49.9 IU/ML — HIGH
TIBC SERPL-MCNC: 202 UG/DL — LOW (ref 220–430)
TRIGL SERPL-MCNC: 141 MG/DL — SIGNIFICANT CHANGE UP
TRIGL SERPL-MCNC: 145 MG/DL — SIGNIFICANT CHANGE UP
TRIGL SERPL-MCNC: 183 MG/DL — HIGH
TRIGL SERPL-MCNC: 191 MG/DL — HIGH
TRIGL SERPL-MCNC: 518 MG/DL — HIGH
TRIGL SERPL-MCNC: 679 MG/DL — HIGH
TSH RECEP AB FLD-ACNC: <1.1 IU/L — SIGNIFICANT CHANGE UP (ref 0–1.75)
TSH RECEP AB FLD-ACNC: <1.1 IU/L — SIGNIFICANT CHANGE UP (ref 0–1.75)
TSH SERPL-MCNC: 0.01 UIU/ML — LOW (ref 0.27–4.2)
TSH SERPL-MCNC: 0.02 UIU/ML — LOW (ref 0.27–4.2)
TSH SERPL-MCNC: 0.06 UIU/ML — LOW (ref 0.27–4.2)
TSH SERPL-MCNC: 0.22 UIU/ML — LOW (ref 0.27–4.2)
TSH SERPL-MCNC: 0.9 UIU/ML — SIGNIFICANT CHANGE UP (ref 0.27–4.2)
TSH SERPL-MCNC: 1.45 UIU/ML — SIGNIFICANT CHANGE UP (ref 0.27–4.2)
TSH SERPL-MCNC: 1.98 UIU/ML — SIGNIFICANT CHANGE UP (ref 0.27–4.2)
TSH SERPL-MCNC: <0.01 UIU/ML — LOW (ref 0.27–4.2)
TSI ACT/NOR SER: <0.1 IU/L — SIGNIFICANT CHANGE UP (ref 0–0.55)
UIBC SERPL-MCNC: 176 UG/DL — SIGNIFICANT CHANGE UP (ref 110–370)
URATE UR-MCNC: 55.8 MG/DL — SIGNIFICANT CHANGE UP
UROBILINOGEN FLD QL: ABNORMAL
UROBILINOGEN FLD QL: NEGATIVE — SIGNIFICANT CHANGE UP
VANCOMYCIN TROUGH SERPL-MCNC: 11.3 UG/ML — SIGNIFICANT CHANGE UP (ref 10–20)
VANCOMYCIN TROUGH SERPL-MCNC: 11.7 UG/ML — SIGNIFICANT CHANGE UP (ref 10–20)
VANCOMYCIN TROUGH SERPL-MCNC: 13.6 UG/ML — SIGNIFICANT CHANGE UP (ref 10–20)
VANCOMYCIN TROUGH SERPL-MCNC: 14.3 UG/ML — SIGNIFICANT CHANGE UP (ref 10–20)
VANCOMYCIN TROUGH SERPL-MCNC: 14.3 UG/ML — SIGNIFICANT CHANGE UP (ref 10–20)
VANCOMYCIN TROUGH SERPL-MCNC: 16.1 UG/ML — SIGNIFICANT CHANGE UP (ref 10–20)
VANCOMYCIN TROUGH SERPL-MCNC: 4.3 UG/ML — LOW (ref 10–20)
VANCOMYCIN TROUGH SERPL-MCNC: 4.5 UG/ML — LOW (ref 10–20)
VANCOMYCIN TROUGH SERPL-MCNC: 7 UG/ML — LOW (ref 10–20)
VANCOMYCIN TROUGH SERPL-MCNC: 7.3 UG/ML — LOW (ref 10–20)
VANCOMYCIN TROUGH SERPL-MCNC: 8.6 UG/ML — LOW (ref 10–20)
VANCOMYCIN TROUGH SERPL-MCNC: 9.1 UG/ML — LOW (ref 10–20)
VANCOMYCIN TROUGH SERPL-MCNC: 9.6 UG/ML — LOW (ref 10–20)
VANCOMYCIN TROUGH SERPL-MCNC: <4 UG/ML — LOW (ref 10–20)
VARIANT LYMPHS # BLD: 0.9 % — SIGNIFICANT CHANGE UP (ref 0–6)
VIT D25+D1,25 OH+D1,25 PNL SERPL-MCNC: 31.3 PG/ML — SIGNIFICANT CHANGE UP (ref 19.9–79.3)
WBC # BLD: 10.05 K/UL — SIGNIFICANT CHANGE UP (ref 3.8–10.5)
WBC # BLD: 10.3 K/UL — SIGNIFICANT CHANGE UP (ref 3.8–10.5)
WBC # BLD: 10.43 K/UL — SIGNIFICANT CHANGE UP (ref 3.8–10.5)
WBC # BLD: 10.46 K/UL — SIGNIFICANT CHANGE UP (ref 3.8–10.5)
WBC # BLD: 10.55 K/UL — HIGH (ref 3.8–10.5)
WBC # BLD: 10.59 K/UL — HIGH (ref 3.8–10.5)
WBC # BLD: 10.66 K/UL — HIGH (ref 3.8–10.5)
WBC # BLD: 10.7 K/UL — HIGH (ref 3.8–10.5)
WBC # BLD: 10.8 K/UL — HIGH (ref 3.8–10.5)
WBC # BLD: 10.9 K/UL — HIGH (ref 3.8–10.5)
WBC # BLD: 10.9 K/UL — HIGH (ref 3.8–10.5)
WBC # BLD: 10.92 K/UL — HIGH (ref 3.8–10.5)
WBC # BLD: 10.94 K/UL — HIGH (ref 3.8–10.5)
WBC # BLD: 11.13 K/UL — HIGH (ref 3.8–10.5)
WBC # BLD: 11.15 K/UL — HIGH (ref 3.8–10.5)
WBC # BLD: 11.27 K/UL — HIGH (ref 3.8–10.5)
WBC # BLD: 11.27 K/UL — HIGH (ref 3.8–10.5)
WBC # BLD: 11.31 K/UL — HIGH (ref 3.8–10.5)
WBC # BLD: 11.46 K/UL — HIGH (ref 3.8–10.5)
WBC # BLD: 11.52 K/UL — HIGH (ref 3.8–10.5)
WBC # BLD: 11.54 K/UL — HIGH (ref 3.8–10.5)
WBC # BLD: 11.54 K/UL — HIGH (ref 3.8–10.5)
WBC # BLD: 11.58 K/UL — HIGH (ref 3.8–10.5)
WBC # BLD: 11.59 K/UL — HIGH (ref 3.8–10.5)
WBC # BLD: 11.74 K/UL — HIGH (ref 3.8–10.5)
WBC # BLD: 11.78 K/UL — HIGH (ref 3.8–10.5)
WBC # BLD: 11.78 K/UL — HIGH (ref 3.8–10.5)
WBC # BLD: 11.87 K/UL — HIGH (ref 3.8–10.5)
WBC # BLD: 11.92 K/UL — HIGH (ref 3.8–10.5)
WBC # BLD: 11.98 K/UL — HIGH (ref 3.8–10.5)
WBC # BLD: 12.08 K/UL — HIGH (ref 3.8–10.5)
WBC # BLD: 12.09 K/UL — HIGH (ref 3.8–10.5)
WBC # BLD: 12.12 K/UL — HIGH (ref 3.8–10.5)
WBC # BLD: 12.13 K/UL — HIGH (ref 3.8–10.5)
WBC # BLD: 12.24 K/UL — HIGH (ref 3.8–10.5)
WBC # BLD: 12.34 K/UL — HIGH (ref 3.8–10.5)
WBC # BLD: 12.46 K/UL — HIGH (ref 3.8–10.5)
WBC # BLD: 12.47 K/UL — HIGH (ref 3.8–10.5)
WBC # BLD: 12.51 K/UL — HIGH (ref 3.8–10.5)
WBC # BLD: 12.66 K/UL — HIGH (ref 3.8–10.5)
WBC # BLD: 12.67 K/UL — HIGH (ref 3.8–10.5)
WBC # BLD: 12.67 K/UL — HIGH (ref 3.8–10.5)
WBC # BLD: 12.69 K/UL — HIGH (ref 3.8–10.5)
WBC # BLD: 12.72 K/UL — HIGH (ref 3.8–10.5)
WBC # BLD: 12.75 K/UL — HIGH (ref 3.8–10.5)
WBC # BLD: 12.84 K/UL — HIGH (ref 3.8–10.5)
WBC # BLD: 12.87 K/UL — HIGH (ref 3.8–10.5)
WBC # BLD: 12.89 K/UL — HIGH (ref 3.8–10.5)
WBC # BLD: 12.9 K/UL — HIGH (ref 3.8–10.5)
WBC # BLD: 12.93 K/UL — HIGH (ref 3.8–10.5)
WBC # BLD: 13.03 K/UL — HIGH (ref 3.8–10.5)
WBC # BLD: 13.1 K/UL — HIGH (ref 3.8–10.5)
WBC # BLD: 13.12 K/UL — HIGH (ref 3.8–10.5)
WBC # BLD: 13.13 K/UL — HIGH (ref 3.8–10.5)
WBC # BLD: 13.21 K/UL — HIGH (ref 3.8–10.5)
WBC # BLD: 13.29 K/UL — HIGH (ref 3.8–10.5)
WBC # BLD: 13.3 K/UL — HIGH (ref 3.8–10.5)
WBC # BLD: 13.35 K/UL — HIGH (ref 3.8–10.5)
WBC # BLD: 13.44 K/UL — HIGH (ref 3.8–10.5)
WBC # BLD: 13.49 K/UL — HIGH (ref 3.8–10.5)
WBC # BLD: 13.52 K/UL — HIGH (ref 3.8–10.5)
WBC # BLD: 13.52 K/UL — HIGH (ref 3.8–10.5)
WBC # BLD: 13.58 K/UL — HIGH (ref 3.8–10.5)
WBC # BLD: 13.59 K/UL — HIGH (ref 3.8–10.5)
WBC # BLD: 13.6 K/UL — HIGH (ref 3.8–10.5)
WBC # BLD: 13.65 K/UL — HIGH (ref 3.8–10.5)
WBC # BLD: 13.66 K/UL — HIGH (ref 3.8–10.5)
WBC # BLD: 13.75 K/UL — HIGH (ref 3.8–10.5)
WBC # BLD: 13.76 K/UL — HIGH (ref 3.8–10.5)
WBC # BLD: 13.83 K/UL — HIGH (ref 3.8–10.5)
WBC # BLD: 13.94 K/UL — HIGH (ref 3.8–10.5)
WBC # BLD: 14.01 K/UL — HIGH (ref 3.8–10.5)
WBC # BLD: 14.02 K/UL — HIGH (ref 3.8–10.5)
WBC # BLD: 14.03 K/UL — HIGH (ref 3.8–10.5)
WBC # BLD: 14.05 K/UL — HIGH (ref 3.8–10.5)
WBC # BLD: 14.12 K/UL — HIGH (ref 3.8–10.5)
WBC # BLD: 14.18 K/UL — HIGH (ref 3.8–10.5)
WBC # BLD: 14.24 K/UL — HIGH (ref 3.8–10.5)
WBC # BLD: 14.38 K/UL — HIGH (ref 3.8–10.5)
WBC # BLD: 14.66 K/UL — HIGH (ref 3.8–10.5)
WBC # BLD: 14.78 K/UL — HIGH (ref 3.8–10.5)
WBC # BLD: 14.79 K/UL — HIGH (ref 3.8–10.5)
WBC # BLD: 14.81 K/UL — HIGH (ref 3.8–10.5)
WBC # BLD: 14.92 K/UL — HIGH (ref 3.8–10.5)
WBC # BLD: 15.06 K/UL — HIGH (ref 3.8–10.5)
WBC # BLD: 15.07 K/UL — HIGH (ref 3.8–10.5)
WBC # BLD: 15.28 K/UL — HIGH (ref 3.8–10.5)
WBC # BLD: 15.31 K/UL — HIGH (ref 3.8–10.5)
WBC # BLD: 15.36 K/UL — HIGH (ref 3.8–10.5)
WBC # BLD: 15.41 K/UL — HIGH (ref 3.8–10.5)
WBC # BLD: 15.48 K/UL — HIGH (ref 3.8–10.5)
WBC # BLD: 15.51 K/UL — HIGH (ref 3.8–10.5)
WBC # BLD: 15.52 K/UL — HIGH (ref 3.8–10.5)
WBC # BLD: 15.57 K/UL — HIGH (ref 3.8–10.5)
WBC # BLD: 15.6 K/UL — HIGH (ref 3.8–10.5)
WBC # BLD: 15.62 K/UL — HIGH (ref 3.8–10.5)
WBC # BLD: 15.65 K/UL — HIGH (ref 3.8–10.5)
WBC # BLD: 15.72 K/UL — HIGH (ref 3.8–10.5)
WBC # BLD: 15.74 K/UL — HIGH (ref 3.8–10.5)
WBC # BLD: 15.77 K/UL — HIGH (ref 3.8–10.5)
WBC # BLD: 16.07 K/UL — HIGH (ref 3.8–10.5)
WBC # BLD: 16.1 K/UL — HIGH (ref 3.8–10.5)
WBC # BLD: 16.12 K/UL — HIGH (ref 3.8–10.5)
WBC # BLD: 16.18 K/UL — HIGH (ref 3.8–10.5)
WBC # BLD: 16.23 K/UL — HIGH (ref 3.8–10.5)
WBC # BLD: 16.4 K/UL — HIGH (ref 3.8–10.5)
WBC # BLD: 16.45 K/UL — HIGH (ref 3.8–10.5)
WBC # BLD: 16.58 K/UL — HIGH (ref 3.8–10.5)
WBC # BLD: 16.6 K/UL — HIGH (ref 3.8–10.5)
WBC # BLD: 16.61 K/UL — HIGH (ref 3.8–10.5)
WBC # BLD: 16.97 K/UL — HIGH (ref 3.8–10.5)
WBC # BLD: 17.06 K/UL — HIGH (ref 3.8–10.5)
WBC # BLD: 17.16 K/UL — HIGH (ref 3.8–10.5)
WBC # BLD: 17.63 K/UL — HIGH (ref 3.8–10.5)
WBC # BLD: 17.66 K/UL — HIGH (ref 3.8–10.5)
WBC # BLD: 17.83 K/UL — HIGH (ref 3.8–10.5)
WBC # BLD: 17.85 K/UL — HIGH (ref 3.8–10.5)
WBC # BLD: 17.87 K/UL — HIGH (ref 3.8–10.5)
WBC # BLD: 17.93 K/UL — HIGH (ref 3.8–10.5)
WBC # BLD: 17.94 K/UL — HIGH (ref 3.8–10.5)
WBC # BLD: 18.07 K/UL — HIGH (ref 3.8–10.5)
WBC # BLD: 18.14 K/UL — HIGH (ref 3.8–10.5)
WBC # BLD: 18.35 K/UL — HIGH (ref 3.8–10.5)
WBC # BLD: 18.65 K/UL — HIGH (ref 3.8–10.5)
WBC # BLD: 18.67 K/UL — HIGH (ref 3.8–10.5)
WBC # BLD: 19.5 K/UL — HIGH (ref 3.8–10.5)
WBC # BLD: 19.79 K/UL — HIGH (ref 3.8–10.5)
WBC # BLD: 19.89 K/UL — HIGH (ref 3.8–10.5)
WBC # BLD: 20.02 K/UL — HIGH (ref 3.8–10.5)
WBC # BLD: 20.12 K/UL — HIGH (ref 3.8–10.5)
WBC # BLD: 20.33 K/UL — HIGH (ref 3.8–10.5)
WBC # BLD: 20.51 K/UL — HIGH (ref 3.8–10.5)
WBC # BLD: 20.58 K/UL — HIGH (ref 3.8–10.5)
WBC # BLD: 21.01 K/UL — HIGH (ref 3.8–10.5)
WBC # BLD: 21.21 K/UL — HIGH (ref 3.8–10.5)
WBC # BLD: 21.31 K/UL — HIGH (ref 3.8–10.5)
WBC # BLD: 21.33 K/UL — HIGH (ref 3.8–10.5)
WBC # BLD: 21.63 K/UL — HIGH (ref 3.8–10.5)
WBC # BLD: 21.66 K/UL — HIGH (ref 3.8–10.5)
WBC # BLD: 22.14 K/UL — HIGH (ref 3.8–10.5)
WBC # BLD: 22.73 K/UL — HIGH (ref 3.8–10.5)
WBC # BLD: 22.87 K/UL — HIGH (ref 3.8–10.5)
WBC # BLD: 22.89 K/UL — HIGH (ref 3.8–10.5)
WBC # BLD: 22.9 K/UL — HIGH (ref 3.8–10.5)
WBC # BLD: 22.98 K/UL — HIGH (ref 3.8–10.5)
WBC # BLD: 23.47 K/UL — HIGH (ref 3.8–10.5)
WBC # BLD: 25 K/UL — HIGH (ref 3.8–10.5)
WBC # BLD: 25.01 K/UL — HIGH (ref 3.8–10.5)
WBC # BLD: 25.45 K/UL — HIGH (ref 3.8–10.5)
WBC # BLD: 28.31 K/UL — HIGH (ref 3.8–10.5)
WBC # BLD: 32.37 K/UL — HIGH (ref 3.8–10.5)
WBC # BLD: 34.99 K/UL — HIGH (ref 3.8–10.5)
WBC # BLD: 36.59 K/UL — HIGH (ref 3.8–10.5)
WBC # BLD: 4.43 K/UL — SIGNIFICANT CHANGE UP (ref 3.8–10.5)
WBC # BLD: 4.89 K/UL — SIGNIFICANT CHANGE UP (ref 3.8–10.5)
WBC # BLD: 43.19 K/UL — CRITICAL HIGH (ref 3.8–10.5)
WBC # BLD: 5.26 K/UL — SIGNIFICANT CHANGE UP (ref 3.8–10.5)
WBC # BLD: 5.32 K/UL — SIGNIFICANT CHANGE UP (ref 3.8–10.5)
WBC # BLD: 5.39 K/UL — SIGNIFICANT CHANGE UP (ref 3.8–10.5)
WBC # BLD: 5.9 K/UL — SIGNIFICANT CHANGE UP (ref 3.8–10.5)
WBC # BLD: 5.93 K/UL — SIGNIFICANT CHANGE UP (ref 3.8–10.5)
WBC # BLD: 6.02 K/UL — SIGNIFICANT CHANGE UP (ref 3.8–10.5)
WBC # BLD: 6.04 K/UL — SIGNIFICANT CHANGE UP (ref 3.8–10.5)
WBC # BLD: 6.15 K/UL — SIGNIFICANT CHANGE UP (ref 3.8–10.5)
WBC # BLD: 6.51 K/UL — SIGNIFICANT CHANGE UP (ref 3.8–10.5)
WBC # BLD: 6.79 K/UL — SIGNIFICANT CHANGE UP (ref 3.8–10.5)
WBC # BLD: 6.83 K/UL — SIGNIFICANT CHANGE UP (ref 3.8–10.5)
WBC # BLD: 6.84 K/UL — SIGNIFICANT CHANGE UP (ref 3.8–10.5)
WBC # BLD: 6.88 K/UL — SIGNIFICANT CHANGE UP (ref 3.8–10.5)
WBC # BLD: 7.04 K/UL — SIGNIFICANT CHANGE UP (ref 3.8–10.5)
WBC # BLD: 7.2 K/UL — SIGNIFICANT CHANGE UP (ref 3.8–10.5)
WBC # BLD: 7.3 K/UL — SIGNIFICANT CHANGE UP (ref 3.8–10.5)
WBC # BLD: 7.31 K/UL — SIGNIFICANT CHANGE UP (ref 3.8–10.5)
WBC # BLD: 7.31 K/UL — SIGNIFICANT CHANGE UP (ref 3.8–10.5)
WBC # BLD: 7.55 K/UL — SIGNIFICANT CHANGE UP (ref 3.8–10.5)
WBC # BLD: 7.56 K/UL — SIGNIFICANT CHANGE UP (ref 3.8–10.5)
WBC # BLD: 7.57 K/UL — SIGNIFICANT CHANGE UP (ref 3.8–10.5)
WBC # BLD: 7.74 K/UL — SIGNIFICANT CHANGE UP (ref 3.8–10.5)
WBC # BLD: 7.76 K/UL — SIGNIFICANT CHANGE UP (ref 3.8–10.5)
WBC # BLD: 7.93 K/UL — SIGNIFICANT CHANGE UP (ref 3.8–10.5)
WBC # BLD: 7.94 K/UL — SIGNIFICANT CHANGE UP (ref 3.8–10.5)
WBC # BLD: 7.99 K/UL — SIGNIFICANT CHANGE UP (ref 3.8–10.5)
WBC # BLD: 8.03 K/UL — SIGNIFICANT CHANGE UP (ref 3.8–10.5)
WBC # BLD: 8.05 K/UL — SIGNIFICANT CHANGE UP (ref 3.8–10.5)
WBC # BLD: 8.05 K/UL — SIGNIFICANT CHANGE UP (ref 3.8–10.5)
WBC # BLD: 8.25 K/UL — SIGNIFICANT CHANGE UP (ref 3.8–10.5)
WBC # BLD: 8.33 K/UL — SIGNIFICANT CHANGE UP (ref 3.8–10.5)
WBC # BLD: 8.34 K/UL — SIGNIFICANT CHANGE UP (ref 3.8–10.5)
WBC # BLD: 8.36 K/UL — SIGNIFICANT CHANGE UP (ref 3.8–10.5)
WBC # BLD: 8.36 K/UL — SIGNIFICANT CHANGE UP (ref 3.8–10.5)
WBC # BLD: 8.44 K/UL — SIGNIFICANT CHANGE UP (ref 3.8–10.5)
WBC # BLD: 8.51 K/UL — SIGNIFICANT CHANGE UP (ref 3.8–10.5)
WBC # BLD: 8.52 K/UL — SIGNIFICANT CHANGE UP (ref 3.8–10.5)
WBC # BLD: 8.58 K/UL — SIGNIFICANT CHANGE UP (ref 3.8–10.5)
WBC # BLD: 8.62 K/UL — SIGNIFICANT CHANGE UP (ref 3.8–10.5)
WBC # BLD: 8.64 K/UL — SIGNIFICANT CHANGE UP (ref 3.8–10.5)
WBC # BLD: 8.68 K/UL — SIGNIFICANT CHANGE UP (ref 3.8–10.5)
WBC # BLD: 8.72 K/UL — SIGNIFICANT CHANGE UP (ref 3.8–10.5)
WBC # BLD: 8.73 K/UL — SIGNIFICANT CHANGE UP (ref 3.8–10.5)
WBC # BLD: 8.77 K/UL — SIGNIFICANT CHANGE UP (ref 3.8–10.5)
WBC # BLD: 8.77 K/UL — SIGNIFICANT CHANGE UP (ref 3.8–10.5)
WBC # BLD: 8.78 K/UL — SIGNIFICANT CHANGE UP (ref 3.8–10.5)
WBC # BLD: 8.8 K/UL — SIGNIFICANT CHANGE UP (ref 3.8–10.5)
WBC # BLD: 8.82 K/UL — SIGNIFICANT CHANGE UP (ref 3.8–10.5)
WBC # BLD: 9.1 K/UL — SIGNIFICANT CHANGE UP (ref 3.8–10.5)
WBC # BLD: 9.11 K/UL — SIGNIFICANT CHANGE UP (ref 3.8–10.5)
WBC # BLD: 9.13 K/UL — SIGNIFICANT CHANGE UP (ref 3.8–10.5)
WBC # BLD: 9.23 K/UL — SIGNIFICANT CHANGE UP (ref 3.8–10.5)
WBC # BLD: 9.24 K/UL — SIGNIFICANT CHANGE UP (ref 3.8–10.5)
WBC # BLD: 9.31 K/UL — SIGNIFICANT CHANGE UP (ref 3.8–10.5)
WBC # BLD: 9.33 K/UL — SIGNIFICANT CHANGE UP (ref 3.8–10.5)
WBC # BLD: 9.39 K/UL — SIGNIFICANT CHANGE UP (ref 3.8–10.5)
WBC # BLD: 9.49 K/UL — SIGNIFICANT CHANGE UP (ref 3.8–10.5)
WBC # BLD: 9.66 K/UL — SIGNIFICANT CHANGE UP (ref 3.8–10.5)
WBC # BLD: 9.69 K/UL — SIGNIFICANT CHANGE UP (ref 3.8–10.5)
WBC # BLD: 9.81 K/UL — SIGNIFICANT CHANGE UP (ref 3.8–10.5)
WBC # BLD: 9.82 K/UL — SIGNIFICANT CHANGE UP (ref 3.8–10.5)
WBC # BLD: 9.83 K/UL — SIGNIFICANT CHANGE UP (ref 3.8–10.5)
WBC # BLD: 9.93 K/UL — SIGNIFICANT CHANGE UP (ref 3.8–10.5)
WBC # FLD AUTO: 10.05 K/UL — SIGNIFICANT CHANGE UP (ref 3.8–10.5)
WBC # FLD AUTO: 10.3 K/UL — SIGNIFICANT CHANGE UP (ref 3.8–10.5)
WBC # FLD AUTO: 10.43 K/UL — SIGNIFICANT CHANGE UP (ref 3.8–10.5)
WBC # FLD AUTO: 10.46 K/UL — SIGNIFICANT CHANGE UP (ref 3.8–10.5)
WBC # FLD AUTO: 10.55 K/UL — HIGH (ref 3.8–10.5)
WBC # FLD AUTO: 10.59 K/UL — HIGH (ref 3.8–10.5)
WBC # FLD AUTO: 10.66 K/UL — HIGH (ref 3.8–10.5)
WBC # FLD AUTO: 10.7 K/UL — HIGH (ref 3.8–10.5)
WBC # FLD AUTO: 10.8 K/UL — HIGH (ref 3.8–10.5)
WBC # FLD AUTO: 10.9 K/UL — HIGH (ref 3.8–10.5)
WBC # FLD AUTO: 10.9 K/UL — HIGH (ref 3.8–10.5)
WBC # FLD AUTO: 10.92 K/UL — HIGH (ref 3.8–10.5)
WBC # FLD AUTO: 10.94 K/UL — HIGH (ref 3.8–10.5)
WBC # FLD AUTO: 11.13 K/UL — HIGH (ref 3.8–10.5)
WBC # FLD AUTO: 11.15 K/UL — HIGH (ref 3.8–10.5)
WBC # FLD AUTO: 11.27 K/UL — HIGH (ref 3.8–10.5)
WBC # FLD AUTO: 11.27 K/UL — HIGH (ref 3.8–10.5)
WBC # FLD AUTO: 11.31 K/UL — HIGH (ref 3.8–10.5)
WBC # FLD AUTO: 11.46 K/UL — HIGH (ref 3.8–10.5)
WBC # FLD AUTO: 11.52 K/UL — HIGH (ref 3.8–10.5)
WBC # FLD AUTO: 11.54 K/UL — HIGH (ref 3.8–10.5)
WBC # FLD AUTO: 11.54 K/UL — HIGH (ref 3.8–10.5)
WBC # FLD AUTO: 11.58 K/UL — HIGH (ref 3.8–10.5)
WBC # FLD AUTO: 11.59 K/UL — HIGH (ref 3.8–10.5)
WBC # FLD AUTO: 11.74 K/UL — HIGH (ref 3.8–10.5)
WBC # FLD AUTO: 11.78 K/UL — HIGH (ref 3.8–10.5)
WBC # FLD AUTO: 11.78 K/UL — HIGH (ref 3.8–10.5)
WBC # FLD AUTO: 11.87 K/UL — HIGH (ref 3.8–10.5)
WBC # FLD AUTO: 11.92 K/UL — HIGH (ref 3.8–10.5)
WBC # FLD AUTO: 11.98 K/UL — HIGH (ref 3.8–10.5)
WBC # FLD AUTO: 12.08 K/UL — HIGH (ref 3.8–10.5)
WBC # FLD AUTO: 12.09 K/UL — HIGH (ref 3.8–10.5)
WBC # FLD AUTO: 12.12 K/UL — HIGH (ref 3.8–10.5)
WBC # FLD AUTO: 12.13 K/UL — HIGH (ref 3.8–10.5)
WBC # FLD AUTO: 12.24 K/UL — HIGH (ref 3.8–10.5)
WBC # FLD AUTO: 12.34 K/UL — HIGH (ref 3.8–10.5)
WBC # FLD AUTO: 12.46 K/UL — HIGH (ref 3.8–10.5)
WBC # FLD AUTO: 12.47 K/UL — HIGH (ref 3.8–10.5)
WBC # FLD AUTO: 12.51 K/UL — HIGH (ref 3.8–10.5)
WBC # FLD AUTO: 12.66 K/UL — HIGH (ref 3.8–10.5)
WBC # FLD AUTO: 12.67 K/UL — HIGH (ref 3.8–10.5)
WBC # FLD AUTO: 12.67 K/UL — HIGH (ref 3.8–10.5)
WBC # FLD AUTO: 12.69 K/UL — HIGH (ref 3.8–10.5)
WBC # FLD AUTO: 12.72 K/UL — HIGH (ref 3.8–10.5)
WBC # FLD AUTO: 12.75 K/UL — HIGH (ref 3.8–10.5)
WBC # FLD AUTO: 12.84 K/UL — HIGH (ref 3.8–10.5)
WBC # FLD AUTO: 12.87 K/UL — HIGH (ref 3.8–10.5)
WBC # FLD AUTO: 12.89 K/UL — HIGH (ref 3.8–10.5)
WBC # FLD AUTO: 12.9 K/UL — HIGH (ref 3.8–10.5)
WBC # FLD AUTO: 12.93 K/UL — HIGH (ref 3.8–10.5)
WBC # FLD AUTO: 13.03 K/UL — HIGH (ref 3.8–10.5)
WBC # FLD AUTO: 13.1 K/UL — HIGH (ref 3.8–10.5)
WBC # FLD AUTO: 13.12 K/UL — HIGH (ref 3.8–10.5)
WBC # FLD AUTO: 13.13 K/UL — HIGH (ref 3.8–10.5)
WBC # FLD AUTO: 13.21 K/UL — HIGH (ref 3.8–10.5)
WBC # FLD AUTO: 13.29 K/UL — HIGH (ref 3.8–10.5)
WBC # FLD AUTO: 13.3 K/UL — HIGH (ref 3.8–10.5)
WBC # FLD AUTO: 13.35 K/UL — HIGH (ref 3.8–10.5)
WBC # FLD AUTO: 13.44 K/UL — HIGH (ref 3.8–10.5)
WBC # FLD AUTO: 13.49 K/UL — HIGH (ref 3.8–10.5)
WBC # FLD AUTO: 13.52 K/UL — HIGH (ref 3.8–10.5)
WBC # FLD AUTO: 13.52 K/UL — HIGH (ref 3.8–10.5)
WBC # FLD AUTO: 13.58 K/UL — HIGH (ref 3.8–10.5)
WBC # FLD AUTO: 13.59 K/UL — HIGH (ref 3.8–10.5)
WBC # FLD AUTO: 13.6 K/UL — HIGH (ref 3.8–10.5)
WBC # FLD AUTO: 13.65 K/UL — HIGH (ref 3.8–10.5)
WBC # FLD AUTO: 13.66 K/UL — HIGH (ref 3.8–10.5)
WBC # FLD AUTO: 13.75 K/UL — HIGH (ref 3.8–10.5)
WBC # FLD AUTO: 13.76 K/UL — HIGH (ref 3.8–10.5)
WBC # FLD AUTO: 13.83 K/UL — HIGH (ref 3.8–10.5)
WBC # FLD AUTO: 13.94 K/UL — HIGH (ref 3.8–10.5)
WBC # FLD AUTO: 14.01 K/UL — HIGH (ref 3.8–10.5)
WBC # FLD AUTO: 14.02 K/UL — HIGH (ref 3.8–10.5)
WBC # FLD AUTO: 14.03 K/UL — HIGH (ref 3.8–10.5)
WBC # FLD AUTO: 14.05 K/UL — HIGH (ref 3.8–10.5)
WBC # FLD AUTO: 14.12 K/UL — HIGH (ref 3.8–10.5)
WBC # FLD AUTO: 14.18 K/UL — HIGH (ref 3.8–10.5)
WBC # FLD AUTO: 14.24 K/UL — HIGH (ref 3.8–10.5)
WBC # FLD AUTO: 14.38 K/UL — HIGH (ref 3.8–10.5)
WBC # FLD AUTO: 14.66 K/UL — HIGH (ref 3.8–10.5)
WBC # FLD AUTO: 14.78 K/UL — HIGH (ref 3.8–10.5)
WBC # FLD AUTO: 14.79 K/UL — HIGH (ref 3.8–10.5)
WBC # FLD AUTO: 14.81 K/UL — HIGH (ref 3.8–10.5)
WBC # FLD AUTO: 14.92 K/UL — HIGH (ref 3.8–10.5)
WBC # FLD AUTO: 15.06 K/UL — HIGH (ref 3.8–10.5)
WBC # FLD AUTO: 15.07 K/UL — HIGH (ref 3.8–10.5)
WBC # FLD AUTO: 15.28 K/UL — HIGH (ref 3.8–10.5)
WBC # FLD AUTO: 15.31 K/UL — HIGH (ref 3.8–10.5)
WBC # FLD AUTO: 15.36 K/UL — HIGH (ref 3.8–10.5)
WBC # FLD AUTO: 15.41 K/UL — HIGH (ref 3.8–10.5)
WBC # FLD AUTO: 15.48 K/UL — HIGH (ref 3.8–10.5)
WBC # FLD AUTO: 15.51 K/UL — HIGH (ref 3.8–10.5)
WBC # FLD AUTO: 15.52 K/UL — HIGH (ref 3.8–10.5)
WBC # FLD AUTO: 15.57 K/UL — HIGH (ref 3.8–10.5)
WBC # FLD AUTO: 15.6 K/UL — HIGH (ref 3.8–10.5)
WBC # FLD AUTO: 15.62 K/UL — HIGH (ref 3.8–10.5)
WBC # FLD AUTO: 15.65 K/UL — HIGH (ref 3.8–10.5)
WBC # FLD AUTO: 15.72 K/UL — HIGH (ref 3.8–10.5)
WBC # FLD AUTO: 15.74 K/UL — HIGH (ref 3.8–10.5)
WBC # FLD AUTO: 15.77 K/UL — HIGH (ref 3.8–10.5)
WBC # FLD AUTO: 16.07 K/UL — HIGH (ref 3.8–10.5)
WBC # FLD AUTO: 16.1 K/UL — HIGH (ref 3.8–10.5)
WBC # FLD AUTO: 16.12 K/UL — HIGH (ref 3.8–10.5)
WBC # FLD AUTO: 16.18 K/UL — HIGH (ref 3.8–10.5)
WBC # FLD AUTO: 16.23 K/UL — HIGH (ref 3.8–10.5)
WBC # FLD AUTO: 16.4 K/UL — HIGH (ref 3.8–10.5)
WBC # FLD AUTO: 16.45 K/UL — HIGH (ref 3.8–10.5)
WBC # FLD AUTO: 16.58 K/UL — HIGH (ref 3.8–10.5)
WBC # FLD AUTO: 16.6 K/UL — HIGH (ref 3.8–10.5)
WBC # FLD AUTO: 16.61 K/UL — HIGH (ref 3.8–10.5)
WBC # FLD AUTO: 16.97 K/UL — HIGH (ref 3.8–10.5)
WBC # FLD AUTO: 17.06 K/UL — HIGH (ref 3.8–10.5)
WBC # FLD AUTO: 17.16 K/UL — HIGH (ref 3.8–10.5)
WBC # FLD AUTO: 17.63 K/UL — HIGH (ref 3.8–10.5)
WBC # FLD AUTO: 17.66 K/UL — HIGH (ref 3.8–10.5)
WBC # FLD AUTO: 17.83 K/UL — HIGH (ref 3.8–10.5)
WBC # FLD AUTO: 17.85 K/UL — HIGH (ref 3.8–10.5)
WBC # FLD AUTO: 17.87 K/UL — HIGH (ref 3.8–10.5)
WBC # FLD AUTO: 17.93 K/UL — HIGH (ref 3.8–10.5)
WBC # FLD AUTO: 17.94 K/UL — HIGH (ref 3.8–10.5)
WBC # FLD AUTO: 18.07 K/UL — HIGH (ref 3.8–10.5)
WBC # FLD AUTO: 18.14 K/UL — HIGH (ref 3.8–10.5)
WBC # FLD AUTO: 18.35 K/UL — HIGH (ref 3.8–10.5)
WBC # FLD AUTO: 18.65 K/UL — HIGH (ref 3.8–10.5)
WBC # FLD AUTO: 18.67 K/UL — HIGH (ref 3.8–10.5)
WBC # FLD AUTO: 19.5 K/UL — HIGH (ref 3.8–10.5)
WBC # FLD AUTO: 19.79 K/UL — HIGH (ref 3.8–10.5)
WBC # FLD AUTO: 19.89 K/UL — HIGH (ref 3.8–10.5)
WBC # FLD AUTO: 20.02 K/UL — HIGH (ref 3.8–10.5)
WBC # FLD AUTO: 20.12 K/UL — HIGH (ref 3.8–10.5)
WBC # FLD AUTO: 20.33 K/UL — HIGH (ref 3.8–10.5)
WBC # FLD AUTO: 20.51 K/UL — HIGH (ref 3.8–10.5)
WBC # FLD AUTO: 20.58 K/UL — HIGH (ref 3.8–10.5)
WBC # FLD AUTO: 21.01 K/UL — HIGH (ref 3.8–10.5)
WBC # FLD AUTO: 21.21 K/UL — HIGH (ref 3.8–10.5)
WBC # FLD AUTO: 21.31 K/UL — HIGH (ref 3.8–10.5)
WBC # FLD AUTO: 21.33 K/UL — HIGH (ref 3.8–10.5)
WBC # FLD AUTO: 21.63 K/UL — HIGH (ref 3.8–10.5)
WBC # FLD AUTO: 21.66 K/UL — HIGH (ref 3.8–10.5)
WBC # FLD AUTO: 22.14 K/UL — HIGH (ref 3.8–10.5)
WBC # FLD AUTO: 22.73 K/UL — HIGH (ref 3.8–10.5)
WBC # FLD AUTO: 22.87 K/UL — HIGH (ref 3.8–10.5)
WBC # FLD AUTO: 22.89 K/UL — HIGH (ref 3.8–10.5)
WBC # FLD AUTO: 22.9 K/UL — HIGH (ref 3.8–10.5)
WBC # FLD AUTO: 22.98 K/UL — HIGH (ref 3.8–10.5)
WBC # FLD AUTO: 23.47 K/UL — HIGH (ref 3.8–10.5)
WBC # FLD AUTO: 25 K/UL — HIGH (ref 3.8–10.5)
WBC # FLD AUTO: 25.01 K/UL — HIGH (ref 3.8–10.5)
WBC # FLD AUTO: 25.45 K/UL — HIGH (ref 3.8–10.5)
WBC # FLD AUTO: 28.31 K/UL — HIGH (ref 3.8–10.5)
WBC # FLD AUTO: 32.37 K/UL — HIGH (ref 3.8–10.5)
WBC # FLD AUTO: 34.99 K/UL — HIGH (ref 3.8–10.5)
WBC # FLD AUTO: 36.59 K/UL — HIGH (ref 3.8–10.5)
WBC # FLD AUTO: 4.43 K/UL — SIGNIFICANT CHANGE UP (ref 3.8–10.5)
WBC # FLD AUTO: 4.89 K/UL — SIGNIFICANT CHANGE UP (ref 3.8–10.5)
WBC # FLD AUTO: 43.19 K/UL — CRITICAL HIGH (ref 3.8–10.5)
WBC # FLD AUTO: 5.26 K/UL — SIGNIFICANT CHANGE UP (ref 3.8–10.5)
WBC # FLD AUTO: 5.32 K/UL — SIGNIFICANT CHANGE UP (ref 3.8–10.5)
WBC # FLD AUTO: 5.39 K/UL — SIGNIFICANT CHANGE UP (ref 3.8–10.5)
WBC # FLD AUTO: 5.9 K/UL — SIGNIFICANT CHANGE UP (ref 3.8–10.5)
WBC # FLD AUTO: 5.93 K/UL — SIGNIFICANT CHANGE UP (ref 3.8–10.5)
WBC # FLD AUTO: 6.02 K/UL — SIGNIFICANT CHANGE UP (ref 3.8–10.5)
WBC # FLD AUTO: 6.04 K/UL — SIGNIFICANT CHANGE UP (ref 3.8–10.5)
WBC # FLD AUTO: 6.15 K/UL — SIGNIFICANT CHANGE UP (ref 3.8–10.5)
WBC # FLD AUTO: 6.51 K/UL — SIGNIFICANT CHANGE UP (ref 3.8–10.5)
WBC # FLD AUTO: 6.79 K/UL — SIGNIFICANT CHANGE UP (ref 3.8–10.5)
WBC # FLD AUTO: 6.83 K/UL — SIGNIFICANT CHANGE UP (ref 3.8–10.5)
WBC # FLD AUTO: 6.84 K/UL — SIGNIFICANT CHANGE UP (ref 3.8–10.5)
WBC # FLD AUTO: 6.88 K/UL — SIGNIFICANT CHANGE UP (ref 3.8–10.5)
WBC # FLD AUTO: 7.04 K/UL — SIGNIFICANT CHANGE UP (ref 3.8–10.5)
WBC # FLD AUTO: 7.2 K/UL — SIGNIFICANT CHANGE UP (ref 3.8–10.5)
WBC # FLD AUTO: 7.3 K/UL — SIGNIFICANT CHANGE UP (ref 3.8–10.5)
WBC # FLD AUTO: 7.31 K/UL — SIGNIFICANT CHANGE UP (ref 3.8–10.5)
WBC # FLD AUTO: 7.31 K/UL — SIGNIFICANT CHANGE UP (ref 3.8–10.5)
WBC # FLD AUTO: 7.55 K/UL — SIGNIFICANT CHANGE UP (ref 3.8–10.5)
WBC # FLD AUTO: 7.56 K/UL — SIGNIFICANT CHANGE UP (ref 3.8–10.5)
WBC # FLD AUTO: 7.57 K/UL — SIGNIFICANT CHANGE UP (ref 3.8–10.5)
WBC # FLD AUTO: 7.74 K/UL — SIGNIFICANT CHANGE UP (ref 3.8–10.5)
WBC # FLD AUTO: 7.76 K/UL — SIGNIFICANT CHANGE UP (ref 3.8–10.5)
WBC # FLD AUTO: 7.93 K/UL — SIGNIFICANT CHANGE UP (ref 3.8–10.5)
WBC # FLD AUTO: 7.94 K/UL — SIGNIFICANT CHANGE UP (ref 3.8–10.5)
WBC # FLD AUTO: 7.99 K/UL — SIGNIFICANT CHANGE UP (ref 3.8–10.5)
WBC # FLD AUTO: 8.03 K/UL — SIGNIFICANT CHANGE UP (ref 3.8–10.5)
WBC # FLD AUTO: 8.05 K/UL — SIGNIFICANT CHANGE UP (ref 3.8–10.5)
WBC # FLD AUTO: 8.05 K/UL — SIGNIFICANT CHANGE UP (ref 3.8–10.5)
WBC # FLD AUTO: 8.25 K/UL — SIGNIFICANT CHANGE UP (ref 3.8–10.5)
WBC # FLD AUTO: 8.33 K/UL — SIGNIFICANT CHANGE UP (ref 3.8–10.5)
WBC # FLD AUTO: 8.34 K/UL — SIGNIFICANT CHANGE UP (ref 3.8–10.5)
WBC # FLD AUTO: 8.36 K/UL — SIGNIFICANT CHANGE UP (ref 3.8–10.5)
WBC # FLD AUTO: 8.36 K/UL — SIGNIFICANT CHANGE UP (ref 3.8–10.5)
WBC # FLD AUTO: 8.44 K/UL — SIGNIFICANT CHANGE UP (ref 3.8–10.5)
WBC # FLD AUTO: 8.51 K/UL — SIGNIFICANT CHANGE UP (ref 3.8–10.5)
WBC # FLD AUTO: 8.52 K/UL — SIGNIFICANT CHANGE UP (ref 3.8–10.5)
WBC # FLD AUTO: 8.58 K/UL — SIGNIFICANT CHANGE UP (ref 3.8–10.5)
WBC # FLD AUTO: 8.62 K/UL — SIGNIFICANT CHANGE UP (ref 3.8–10.5)
WBC # FLD AUTO: 8.64 K/UL — SIGNIFICANT CHANGE UP (ref 3.8–10.5)
WBC # FLD AUTO: 8.68 K/UL — SIGNIFICANT CHANGE UP (ref 3.8–10.5)
WBC # FLD AUTO: 8.72 K/UL — SIGNIFICANT CHANGE UP (ref 3.8–10.5)
WBC # FLD AUTO: 8.73 K/UL — SIGNIFICANT CHANGE UP (ref 3.8–10.5)
WBC # FLD AUTO: 8.77 K/UL — SIGNIFICANT CHANGE UP (ref 3.8–10.5)
WBC # FLD AUTO: 8.77 K/UL — SIGNIFICANT CHANGE UP (ref 3.8–10.5)
WBC # FLD AUTO: 8.78 K/UL — SIGNIFICANT CHANGE UP (ref 3.8–10.5)
WBC # FLD AUTO: 8.8 K/UL — SIGNIFICANT CHANGE UP (ref 3.8–10.5)
WBC # FLD AUTO: 8.82 K/UL — SIGNIFICANT CHANGE UP (ref 3.8–10.5)
WBC # FLD AUTO: 9.1 K/UL — SIGNIFICANT CHANGE UP (ref 3.8–10.5)
WBC # FLD AUTO: 9.11 K/UL — SIGNIFICANT CHANGE UP (ref 3.8–10.5)
WBC # FLD AUTO: 9.13 K/UL — SIGNIFICANT CHANGE UP (ref 3.8–10.5)
WBC # FLD AUTO: 9.23 K/UL — SIGNIFICANT CHANGE UP (ref 3.8–10.5)
WBC # FLD AUTO: 9.24 K/UL — SIGNIFICANT CHANGE UP (ref 3.8–10.5)
WBC # FLD AUTO: 9.31 K/UL — SIGNIFICANT CHANGE UP (ref 3.8–10.5)
WBC # FLD AUTO: 9.33 K/UL — SIGNIFICANT CHANGE UP (ref 3.8–10.5)
WBC # FLD AUTO: 9.39 K/UL — SIGNIFICANT CHANGE UP (ref 3.8–10.5)
WBC # FLD AUTO: 9.49 K/UL — SIGNIFICANT CHANGE UP (ref 3.8–10.5)
WBC # FLD AUTO: 9.66 K/UL — SIGNIFICANT CHANGE UP (ref 3.8–10.5)
WBC # FLD AUTO: 9.69 K/UL — SIGNIFICANT CHANGE UP (ref 3.8–10.5)
WBC # FLD AUTO: 9.81 K/UL — SIGNIFICANT CHANGE UP (ref 3.8–10.5)
WBC # FLD AUTO: 9.82 K/UL — SIGNIFICANT CHANGE UP (ref 3.8–10.5)
WBC # FLD AUTO: 9.83 K/UL — SIGNIFICANT CHANGE UP (ref 3.8–10.5)
WBC # FLD AUTO: 9.93 K/UL — SIGNIFICANT CHANGE UP (ref 3.8–10.5)
WBC UR QL: 0 /HPF — SIGNIFICANT CHANGE UP (ref 0–5)
WBC UR QL: 1 /HPF — SIGNIFICANT CHANGE UP (ref 0–5)
WBC UR QL: 2 /HPF — SIGNIFICANT CHANGE UP (ref 0–5)
WBC UR QL: 26 /HPF — HIGH (ref 0–5)
WBC UR QL: 6 /HPF — HIGH (ref 0–5)

## 2021-01-01 PROCEDURE — 87075 CULTR BACTERIA EXCEPT BLOOD: CPT

## 2021-01-01 PROCEDURE — 99291 CRITICAL CARE FIRST HOUR: CPT

## 2021-01-01 PROCEDURE — 81001 URINALYSIS AUTO W/SCOPE: CPT

## 2021-01-01 PROCEDURE — 36620 INSERTION CATHETER ARTERY: CPT

## 2021-01-01 PROCEDURE — 86900 BLOOD TYPING SEROLOGIC ABO: CPT

## 2021-01-01 PROCEDURE — 71045 X-RAY EXAM CHEST 1 VIEW: CPT | Mod: 26,76

## 2021-01-01 PROCEDURE — 93750 INTERROGATION VAD IN PERSON: CPT

## 2021-01-01 PROCEDURE — 99214 OFFICE O/P EST MOD 30 MIN: CPT

## 2021-01-01 PROCEDURE — 84445 ASSAY OF TSI GLOBULIN: CPT

## 2021-01-01 PROCEDURE — 84439 ASSAY OF FREE THYROXINE: CPT

## 2021-01-01 PROCEDURE — 99233 SBSQ HOSP IP/OBS HIGH 50: CPT | Mod: GC

## 2021-01-01 PROCEDURE — 83930 ASSAY OF BLOOD OSMOLALITY: CPT

## 2021-01-01 PROCEDURE — 82150 ASSAY OF AMYLASE: CPT

## 2021-01-01 PROCEDURE — 71045 X-RAY EXAM CHEST 1 VIEW: CPT | Mod: 26,77

## 2021-01-01 PROCEDURE — 71045 X-RAY EXAM CHEST 1 VIEW: CPT | Mod: 26

## 2021-01-01 PROCEDURE — 99291 CRITICAL CARE FIRST HOUR: CPT | Mod: 25

## 2021-01-01 PROCEDURE — 99232 SBSQ HOSP IP/OBS MODERATE 35: CPT

## 2021-01-01 PROCEDURE — 84134 ASSAY OF PREALBUMIN: CPT

## 2021-01-01 PROCEDURE — U0003: CPT

## 2021-01-01 PROCEDURE — 99233 SBSQ HOSP IP/OBS HIGH 50: CPT | Mod: 25

## 2021-01-01 PROCEDURE — 99221 1ST HOSP IP/OBS SF/LOW 40: CPT

## 2021-01-01 PROCEDURE — 99233 SBSQ HOSP IP/OBS HIGH 50: CPT

## 2021-01-01 PROCEDURE — 94640 AIRWAY INHALATION TREATMENT: CPT

## 2021-01-01 PROCEDURE — 99292 CRITICAL CARE ADDL 30 MIN: CPT

## 2021-01-01 PROCEDURE — 87640 STAPH A DNA AMP PROBE: CPT

## 2021-01-01 PROCEDURE — 99231 SBSQ HOSP IP/OBS SF/LOW 25: CPT

## 2021-01-01 PROCEDURE — 36556 INSERT NON-TUNNEL CV CATH: CPT

## 2021-01-01 PROCEDURE — 36566 INSERT TUNNELED CV CATH: CPT

## 2021-01-01 PROCEDURE — 76882 US LMTD JT/FCL EVL NVASC XTR: CPT

## 2021-01-01 PROCEDURE — 99233 SBSQ HOSP IP/OBS HIGH 50: CPT | Mod: 25,GC

## 2021-01-01 PROCEDURE — 82787 IGG 1 2 3 OR 4 EACH: CPT

## 2021-01-01 PROCEDURE — 99232 SBSQ HOSP IP/OBS MODERATE 35: CPT | Mod: GC

## 2021-01-01 PROCEDURE — 93010 ELECTROCARDIOGRAM REPORT: CPT

## 2021-01-01 PROCEDURE — 99223 1ST HOSP IP/OBS HIGH 75: CPT

## 2021-01-01 PROCEDURE — 97116 GAIT TRAINING THERAPY: CPT

## 2021-01-01 PROCEDURE — 87449 NOS EACH ORGANISM AG IA: CPT

## 2021-01-01 PROCEDURE — 83615 LACTATE (LD) (LDH) ENZYME: CPT

## 2021-01-01 PROCEDURE — 49465 FLUORO EXAM OF G/COLON TUBE: CPT

## 2021-01-01 PROCEDURE — C1887: CPT

## 2021-01-01 PROCEDURE — 94660 CPAP INITIATION&MGMT: CPT

## 2021-01-01 PROCEDURE — G2066: CPT

## 2021-01-01 PROCEDURE — 83935 ASSAY OF URINE OSMOLALITY: CPT

## 2021-01-01 PROCEDURE — 87150 DNA/RNA AMPLIFIED PROBE: CPT

## 2021-01-01 PROCEDURE — 71045 X-RAY EXAM CHEST 1 VIEW: CPT

## 2021-01-01 PROCEDURE — 87040 BLOOD CULTURE FOR BACTERIA: CPT

## 2021-01-01 PROCEDURE — C8929: CPT

## 2021-01-01 PROCEDURE — 99291 CRITICAL CARE FIRST HOUR: CPT | Mod: 24

## 2021-01-01 PROCEDURE — 90791 PSYCH DIAGNOSTIC EVALUATION: CPT

## 2021-01-01 PROCEDURE — 86901 BLOOD TYPING SEROLOGIC RH(D): CPT

## 2021-01-01 PROCEDURE — 92526 ORAL FUNCTION THERAPY: CPT

## 2021-01-01 PROCEDURE — 51703 INSERT BLADDER CATH COMPLEX: CPT

## 2021-01-01 PROCEDURE — 85730 THROMBOPLASTIN TIME PARTIAL: CPT

## 2021-01-01 PROCEDURE — 84443 ASSAY THYROID STIM HORMONE: CPT

## 2021-01-01 PROCEDURE — 99497 ADVNCD CARE PLAN 30 MIN: CPT

## 2021-01-01 PROCEDURE — 74018 RADEX ABDOMEN 1 VIEW: CPT | Mod: 26

## 2021-01-01 PROCEDURE — 99223 1ST HOSP IP/OBS HIGH 75: CPT | Mod: 25

## 2021-01-01 PROCEDURE — 85045 AUTOMATED RETICULOCYTE COUNT: CPT

## 2021-01-01 PROCEDURE — 74018 RADEX ABDOMEN 1 VIEW: CPT

## 2021-01-01 PROCEDURE — 99232 SBSQ HOSP IP/OBS MODERATE 35: CPT | Mod: 25

## 2021-01-01 PROCEDURE — 76705 ECHO EXAM OF ABDOMEN: CPT | Mod: 26,RT

## 2021-01-01 PROCEDURE — 92507 TX SP LANG VOICE COMM INDIV: CPT

## 2021-01-01 PROCEDURE — 87186 SC STD MICRODIL/AGAR DIL: CPT

## 2021-01-01 PROCEDURE — 32556 INSERT CATH PLEURA W/O IMAGE: CPT

## 2021-01-01 PROCEDURE — L8699: CPT

## 2021-01-01 PROCEDURE — 85610 PROTHROMBIN TIME: CPT

## 2021-01-01 PROCEDURE — 82947 ASSAY GLUCOSE BLOOD QUANT: CPT

## 2021-01-01 PROCEDURE — U0005: CPT

## 2021-01-01 PROCEDURE — 97530 THERAPEUTIC ACTIVITIES: CPT

## 2021-01-01 PROCEDURE — 99231 SBSQ HOSP IP/OBS SF/LOW 25: CPT | Mod: GC

## 2021-01-01 PROCEDURE — 74177 CT ABD & PELVIS W/CONTRAST: CPT | Mod: 26

## 2021-01-01 PROCEDURE — 80202 ASSAY OF VANCOMYCIN: CPT

## 2021-01-01 PROCEDURE — 74174 CTA ABD&PLVS W/CONTRAST: CPT | Mod: 26

## 2021-01-01 PROCEDURE — 99233 SBSQ HOSP IP/OBS HIGH 50: CPT | Mod: GC,25

## 2021-01-01 PROCEDURE — 70450 CT HEAD/BRAIN W/O DYE: CPT | Mod: 26

## 2021-01-01 PROCEDURE — 82803 BLOOD GASES ANY COMBINATION: CPT

## 2021-01-01 PROCEDURE — 93306 TTE W/DOPPLER COMPLETE: CPT | Mod: 26

## 2021-01-01 PROCEDURE — 99285 EMERGENCY DEPT VISIT HI MDM: CPT

## 2021-01-01 PROCEDURE — 44360 SMALL BOWEL ENDOSCOPY: CPT | Mod: GC

## 2021-01-01 PROCEDURE — 82435 ASSAY OF BLOOD CHLORIDE: CPT

## 2021-01-01 PROCEDURE — 84165 PROTEIN E-PHORESIS SERUM: CPT

## 2021-01-01 PROCEDURE — G0378: CPT

## 2021-01-01 PROCEDURE — 85652 RBC SED RATE AUTOMATED: CPT

## 2021-01-01 PROCEDURE — 31500 INSERT EMERGENCY AIRWAY: CPT

## 2021-01-01 PROCEDURE — 85025 COMPLETE CBC W/AUTO DIFF WBC: CPT

## 2021-01-01 PROCEDURE — 82565 ASSAY OF CREATININE: CPT

## 2021-01-01 PROCEDURE — 83036 HEMOGLOBIN GLYCOSYLATED A1C: CPT

## 2021-01-01 PROCEDURE — 31615 TRCHEOBRNCHSC EST TRACHS INC: CPT

## 2021-01-01 PROCEDURE — 86480 TB TEST CELL IMMUN MEASURE: CPT

## 2021-01-01 PROCEDURE — 85018 HEMOGLOBIN: CPT

## 2021-01-01 PROCEDURE — 99222 1ST HOSP IP/OBS MODERATE 55: CPT

## 2021-01-01 PROCEDURE — 93298 REM INTERROG DEV EVAL SCRMS: CPT

## 2021-01-01 PROCEDURE — 31600 PLANNED TRACHEOSTOMY: CPT

## 2021-01-01 PROCEDURE — 87641 MR-STAPH DNA AMP PROBE: CPT

## 2021-01-01 PROCEDURE — 83550 IRON BINDING TEST: CPT

## 2021-01-01 PROCEDURE — 71260 CT THORAX DX C+: CPT | Mod: 26

## 2021-01-01 PROCEDURE — 93750 INTERROGATION VAD IN PERSON: CPT | Mod: GC

## 2021-01-01 PROCEDURE — 84436 ASSAY OF TOTAL THYROXINE: CPT

## 2021-01-01 PROCEDURE — 84480 ASSAY TRIIODOTHYRONINE (T3): CPT

## 2021-01-01 PROCEDURE — 36556 INSERT NON-TUNNEL CV CATH: CPT | Mod: 59

## 2021-01-01 PROCEDURE — 93970 EXTREMITY STUDY: CPT | Mod: 26

## 2021-01-01 PROCEDURE — 76700 US EXAM ABDOM COMPLETE: CPT | Mod: 26

## 2021-01-01 PROCEDURE — 85379 FIBRIN DEGRADATION QUANT: CPT

## 2021-01-01 PROCEDURE — 86923 COMPATIBILITY TEST ELECTRIC: CPT

## 2021-01-01 PROCEDURE — 99024 POSTOP FOLLOW-UP VISIT: CPT

## 2021-01-01 PROCEDURE — 99221 1ST HOSP IP/OBS SF/LOW 40: CPT | Mod: 25

## 2021-01-01 PROCEDURE — 94002 VENT MGMT INPAT INIT DAY: CPT

## 2021-01-01 PROCEDURE — 93503 INSERT/PLACE HEART CATHETER: CPT

## 2021-01-01 PROCEDURE — 99223 1ST HOSP IP/OBS HIGH 75: CPT | Mod: GC

## 2021-01-01 PROCEDURE — 97110 THERAPEUTIC EXERCISES: CPT

## 2021-01-01 PROCEDURE — 74177 CT ABD & PELVIS W/CONTRAST: CPT

## 2021-01-01 PROCEDURE — 71046 X-RAY EXAM CHEST 2 VIEWS: CPT

## 2021-01-01 PROCEDURE — 80061 LIPID PANEL: CPT

## 2021-01-01 PROCEDURE — C1769: CPT

## 2021-01-01 PROCEDURE — 87205 SMEAR GRAM STAIN: CPT

## 2021-01-01 PROCEDURE — 99292 CRITICAL CARE ADDL 30 MIN: CPT | Mod: 25

## 2021-01-01 PROCEDURE — 84132 ASSAY OF SERUM POTASSIUM: CPT

## 2021-01-01 PROCEDURE — 87070 CULTURE OTHR SPECIMN AEROBIC: CPT

## 2021-01-01 PROCEDURE — 0225U NFCT DS DNA&RNA 21 SARSCOV2: CPT

## 2021-01-01 PROCEDURE — 94003 VENT MGMT INPAT SUBQ DAY: CPT

## 2021-01-01 PROCEDURE — C1760: CPT

## 2021-01-01 PROCEDURE — 99498 ADVNCD CARE PLAN ADDL 30 MIN: CPT

## 2021-01-01 PROCEDURE — 86780 TREPONEMA PALLIDUM: CPT

## 2021-01-01 PROCEDURE — 83520 IMMUNOASSAY QUANT NOS NONAB: CPT

## 2021-01-01 PROCEDURE — 36569 INSJ PICC 5 YR+ W/O IMAGING: CPT

## 2021-01-01 PROCEDURE — 76000 FLUOROSCOPY <1 HR PHYS/QHP: CPT

## 2021-01-01 PROCEDURE — 82962 GLUCOSE BLOOD TEST: CPT

## 2021-01-01 PROCEDURE — G2066: CPT | Mod: NC

## 2021-01-01 PROCEDURE — 99497 ADVNCD CARE PLAN 30 MIN: CPT | Mod: 25

## 2021-01-01 PROCEDURE — 84145 PROCALCITONIN (PCT): CPT

## 2021-01-01 PROCEDURE — 74176 CT ABD & PELVIS W/O CONTRAST: CPT | Mod: 26

## 2021-01-01 PROCEDURE — 76705 ECHO EXAM OF ABDOMEN: CPT

## 2021-01-01 PROCEDURE — 36415 COLL VENOUS BLD VENIPUNCTURE: CPT

## 2021-01-01 PROCEDURE — 71260 CT THORAX DX C+: CPT

## 2021-01-01 PROCEDURE — 92597 ORAL SPEECH DEVICE EVAL: CPT

## 2021-01-01 PROCEDURE — C1876: CPT

## 2021-01-01 PROCEDURE — 86140 C-REACTIVE PROTEIN: CPT

## 2021-01-01 PROCEDURE — 99072 ADDL SUPL MATRL&STAF TM PHE: CPT

## 2021-01-01 PROCEDURE — 99204 OFFICE O/P NEW MOD 45 MIN: CPT | Mod: 25

## 2021-01-01 PROCEDURE — 71250 CT THORAX DX C-: CPT | Mod: 26

## 2021-01-01 PROCEDURE — 99239 HOSP IP/OBS DSCHRG MGMT >30: CPT

## 2021-01-01 PROCEDURE — 97161 PT EVAL LOW COMPLEX 20 MIN: CPT

## 2021-01-01 PROCEDURE — 87517 HEPATITIS B DNA QUANT: CPT

## 2021-01-01 PROCEDURE — 76700 US EXAM ABDOM COMPLETE: CPT

## 2021-01-01 PROCEDURE — 86376 MICROSOMAL ANTIBODY EACH: CPT

## 2021-01-01 PROCEDURE — 99222 1ST HOSP IP/OBS MODERATE 55: CPT | Mod: GC

## 2021-01-01 PROCEDURE — C1894: CPT

## 2021-01-01 PROCEDURE — 82272 OCCULT BLD FECES 1-3 TESTS: CPT

## 2021-01-01 PROCEDURE — 31575 DIAGNOSTIC LARYNGOSCOPY: CPT

## 2021-01-01 PROCEDURE — 86038 ANTINUCLEAR ANTIBODIES: CPT

## 2021-01-01 PROCEDURE — C1725: CPT

## 2021-01-01 PROCEDURE — 80053 COMPREHEN METABOLIC PANEL: CPT

## 2021-01-01 PROCEDURE — 83605 ASSAY OF LACTIC ACID: CPT

## 2021-01-01 PROCEDURE — 36430 TRANSFUSION BLD/BLD COMPNT: CPT

## 2021-01-01 PROCEDURE — 83735 ASSAY OF MAGNESIUM: CPT

## 2021-01-01 PROCEDURE — 80048 BASIC METABOLIC PNL TOTAL CA: CPT

## 2021-01-01 PROCEDURE — 47490 INCISION OF GALLBLADDER: CPT

## 2021-01-01 PROCEDURE — 91110 GI TRC IMG INTRAL ESOPH-ILE: CPT | Mod: 26,GC

## 2021-01-01 PROCEDURE — 85014 HEMATOCRIT: CPT

## 2021-01-01 PROCEDURE — 93975 VASCULAR STUDY: CPT | Mod: 26

## 2021-01-01 PROCEDURE — 83010 ASSAY OF HAPTOGLOBIN QUANT: CPT

## 2021-01-01 PROCEDURE — 83540 ASSAY OF IRON: CPT

## 2021-01-01 PROCEDURE — 86200 CCP ANTIBODY: CPT

## 2021-01-01 PROCEDURE — 85384 FIBRINOGEN ACTIVITY: CPT

## 2021-01-01 PROCEDURE — P9016: CPT

## 2021-01-01 PROCEDURE — 92610 EVALUATE SWALLOWING FUNCTION: CPT

## 2021-01-01 PROCEDURE — 93005 ELECTROCARDIOGRAM TRACING: CPT

## 2021-01-01 PROCEDURE — 93306 TTE W/DOPPLER COMPLETE: CPT

## 2021-01-01 PROCEDURE — 93975 VASCULAR STUDY: CPT

## 2021-01-01 PROCEDURE — P9045: CPT

## 2021-01-01 PROCEDURE — 83051 HEMOGLOBIN PLASMA: CPT

## 2021-01-01 PROCEDURE — 76882 US LMTD JT/FCL EVL NVASC XTR: CPT | Mod: 26,LT

## 2021-01-01 PROCEDURE — 82330 ASSAY OF CALCIUM: CPT

## 2021-01-01 PROCEDURE — 74174 CTA ABD&PLVS W/CONTRAST: CPT

## 2021-01-01 PROCEDURE — 84481 FREE ASSAY (FT-3): CPT

## 2021-01-01 PROCEDURE — C1889: CPT

## 2021-01-01 PROCEDURE — 82728 ASSAY OF FERRITIN: CPT

## 2021-01-01 PROCEDURE — 84300 ASSAY OF URINE SODIUM: CPT

## 2021-01-01 PROCEDURE — 86036 ANCA SCREEN EACH ANTIBODY: CPT

## 2021-01-01 PROCEDURE — 85027 COMPLETE CBC AUTOMATED: CPT

## 2021-01-01 PROCEDURE — 86850 RBC ANTIBODY SCREEN: CPT

## 2021-01-01 PROCEDURE — 97535 SELF CARE MNGMENT TRAINING: CPT

## 2021-01-01 PROCEDURE — 82248 BILIRUBIN DIRECT: CPT

## 2021-01-01 PROCEDURE — 31624 DX BRONCHOSCOPE/LAVAGE: CPT

## 2021-01-01 PROCEDURE — P9047: CPT

## 2021-01-01 PROCEDURE — 86431 RHEUMATOID FACTOR QUANT: CPT

## 2021-01-01 PROCEDURE — 93000 ELECTROCARDIOGRAM COMPLETE: CPT

## 2021-01-01 PROCEDURE — 83690 ASSAY OF LIPASE: CPT

## 2021-01-01 PROCEDURE — 31720 CLEARANCE OF AIRWAYS: CPT

## 2021-01-01 PROCEDURE — 71250 CT THORAX DX C-: CPT

## 2021-01-01 PROCEDURE — 74176 CT ABD & PELVIS W/O CONTRAST: CPT

## 2021-01-01 PROCEDURE — 99498 ADVNCD CARE PLAN ADDL 30 MIN: CPT | Mod: 25

## 2021-01-01 PROCEDURE — 86078 PHYS BLOOD BANK SERV REACTJ: CPT

## 2021-01-01 PROCEDURE — 84100 ASSAY OF PHOSPHORUS: CPT

## 2021-01-01 PROCEDURE — 84295 ASSAY OF SERUM SODIUM: CPT

## 2021-01-01 PROCEDURE — 87077 CULTURE AEROBIC IDENTIFY: CPT

## 2021-01-01 PROCEDURE — 83529 ASAY OF INTERLEUKIN-6 (IL-6): CPT

## 2021-01-01 RX ORDER — ALBUMIN HUMAN 25 %
250 VIAL (ML) INTRAVENOUS ONCE
Refills: 0 | Status: COMPLETED | OUTPATIENT
Start: 2021-01-01 | End: 2021-01-01

## 2021-01-01 RX ORDER — CEFEPIME 1 G/1
1000 INJECTION, POWDER, FOR SOLUTION INTRAMUSCULAR; INTRAVENOUS ONCE
Refills: 0 | Status: COMPLETED | OUTPATIENT
Start: 2021-01-01 | End: 2021-01-01

## 2021-01-01 RX ORDER — INSULIN HUMAN 100 [IU]/ML
3 INJECTION, SOLUTION SUBCUTANEOUS
Qty: 100 | Refills: 0 | Status: DISCONTINUED | OUTPATIENT
Start: 2021-01-01 | End: 2021-01-01

## 2021-01-01 RX ORDER — PIPERACILLIN AND TAZOBACTAM 4; .5 G/20ML; G/20ML
3.38 INJECTION, POWDER, LYOPHILIZED, FOR SOLUTION INTRAVENOUS ONCE
Refills: 0 | Status: COMPLETED | OUTPATIENT
Start: 2021-01-01 | End: 2021-01-01

## 2021-01-01 RX ORDER — VANCOMYCIN HCL 1 G
125 VIAL (EA) INTRAVENOUS EVERY 12 HOURS
Refills: 0 | Status: DISCONTINUED | OUTPATIENT
Start: 2021-01-01 | End: 2022-01-01

## 2021-01-01 RX ORDER — HUMAN INSULIN 100 [IU]/ML
4 INJECTION, SUSPENSION SUBCUTANEOUS ONCE
Refills: 0 | Status: COMPLETED | OUTPATIENT
Start: 2021-01-01 | End: 2021-01-01

## 2021-01-01 RX ORDER — DEXTROSE 50 % IN WATER 50 %
15 SYRINGE (ML) INTRAVENOUS ONCE
Refills: 0 | Status: DISCONTINUED | OUTPATIENT
Start: 2021-01-01 | End: 2021-01-01

## 2021-01-01 RX ORDER — PANTOPRAZOLE SODIUM 20 MG/1
8 TABLET, DELAYED RELEASE ORAL
Qty: 80 | Refills: 0 | Status: DISCONTINUED | OUTPATIENT
Start: 2021-01-01 | End: 2021-01-01

## 2021-01-01 RX ORDER — SPIRONOLACTONE 25 MG/1
25 TABLET, FILM COATED ORAL DAILY
Refills: 0 | Status: DISCONTINUED | OUTPATIENT
Start: 2021-01-01 | End: 2021-01-01

## 2021-01-01 RX ORDER — POTASSIUM CHLORIDE 20 MEQ
10 PACKET (EA) ORAL
Refills: 0 | Status: COMPLETED | OUTPATIENT
Start: 2021-01-01 | End: 2021-01-01

## 2021-01-01 RX ORDER — ONDANSETRON 8 MG/1
4 TABLET, FILM COATED ORAL ONCE
Refills: 0 | Status: COMPLETED | OUTPATIENT
Start: 2021-01-01 | End: 2021-01-01

## 2021-01-01 RX ORDER — POTASSIUM CHLORIDE 20 MEQ
20 PACKET (EA) ORAL ONCE
Refills: 0 | Status: COMPLETED | OUTPATIENT
Start: 2021-01-01 | End: 2021-01-01

## 2021-01-01 RX ORDER — SODIUM CHLORIDE 9 MG/ML
1000 INJECTION, SOLUTION INTRAVENOUS
Refills: 0 | Status: DISCONTINUED | OUTPATIENT
Start: 2021-01-01 | End: 2021-01-01

## 2021-01-01 RX ORDER — POTASSIUM CHLORIDE 20 MEQ
40 PACKET (EA) ORAL ONCE
Refills: 0 | Status: COMPLETED | OUTPATIENT
Start: 2021-01-01 | End: 2021-01-01

## 2021-01-01 RX ORDER — PSYLLIUM SEED (WITH DEXTROSE)
1 POWDER (GRAM) ORAL DAILY
Refills: 0 | Status: DISCONTINUED | OUTPATIENT
Start: 2021-01-01 | End: 2021-01-01

## 2021-01-01 RX ORDER — WARFARIN SODIUM 2.5 MG/1
6 TABLET ORAL ONCE
Refills: 0 | Status: COMPLETED | OUTPATIENT
Start: 2021-01-01 | End: 2021-01-01

## 2021-01-01 RX ORDER — HUMAN INSULIN 100 [IU]/ML
10 INJECTION, SUSPENSION SUBCUTANEOUS
Refills: 0 | Status: DISCONTINUED | OUTPATIENT
Start: 2021-01-01 | End: 2021-01-01

## 2021-01-01 RX ORDER — FENTANYL CITRATE 50 UG/ML
12 INJECTION INTRAVENOUS
Refills: 0 | Status: DISCONTINUED | OUTPATIENT
Start: 2021-01-01 | End: 2021-01-01

## 2021-01-01 RX ORDER — SODIUM CHLORIDE 9 MG/ML
3 INJECTION INTRAMUSCULAR; INTRAVENOUS; SUBCUTANEOUS EVERY 6 HOURS
Refills: 0 | Status: DISCONTINUED | OUTPATIENT
Start: 2021-01-01 | End: 2021-01-01

## 2021-01-01 RX ORDER — CHLORHEXIDINE GLUCONATE 213 G/1000ML
15 SOLUTION TOPICAL EVERY 12 HOURS
Refills: 0 | Status: DISCONTINUED | OUTPATIENT
Start: 2021-01-01 | End: 2021-01-01

## 2021-01-01 RX ORDER — MAGNESIUM SULFATE 500 MG/ML
2 VIAL (ML) INJECTION ONCE
Refills: 0 | Status: COMPLETED | OUTPATIENT
Start: 2021-01-01 | End: 2021-01-01

## 2021-01-01 RX ORDER — MAGNESIUM SULFATE 500 MG/ML
1 VIAL (ML) INJECTION ONCE
Refills: 0 | Status: COMPLETED | OUTPATIENT
Start: 2021-01-01 | End: 2021-01-01

## 2021-01-01 RX ORDER — POLYETHYLENE GLYCOL 3350 17 G/17G
17 POWDER, FOR SOLUTION ORAL DAILY
Refills: 0 | Status: DISCONTINUED | OUTPATIENT
Start: 2021-01-01 | End: 2021-01-01

## 2021-01-01 RX ORDER — FENTANYL CITRATE 50 UG/ML
0.5 INJECTION INTRAVENOUS
Qty: 5000 | Refills: 0 | Status: DISCONTINUED | OUTPATIENT
Start: 2021-01-01 | End: 2021-01-01

## 2021-01-01 RX ORDER — ENOXAPARIN SODIUM 100 MG/ML
55 INJECTION SUBCUTANEOUS EVERY 12 HOURS
Refills: 0 | Status: DISCONTINUED | OUTPATIENT
Start: 2021-01-01 | End: 2021-01-01

## 2021-01-01 RX ORDER — VASOPRESSIN 20 [USP'U]/ML
0.02 INJECTION INTRAVENOUS
Qty: 50 | Refills: 0 | Status: DISCONTINUED | OUTPATIENT
Start: 2021-01-01 | End: 2021-01-01

## 2021-01-01 RX ORDER — MEROPENEM 1 G/30ML
1000 INJECTION INTRAVENOUS EVERY 8 HOURS
Refills: 0 | Status: DISCONTINUED | OUTPATIENT
Start: 2021-01-01 | End: 2021-01-01

## 2021-01-01 RX ORDER — HYDRALAZINE HCL 50 MG
10 TABLET ORAL EVERY 8 HOURS
Refills: 0 | Status: DISCONTINUED | OUTPATIENT
Start: 2021-01-01 | End: 2021-01-01

## 2021-01-01 RX ORDER — WARFARIN 6 MG/1
6 TABLET ORAL DAILY
Qty: 90 | Refills: 3 | Status: DISCONTINUED | COMMUNITY
Start: 2020-08-04 | End: 2021-01-01

## 2021-01-01 RX ORDER — POTASSIUM CHLORIDE 20 MEQ
10 PACKET (EA) ORAL ONCE
Refills: 0 | Status: COMPLETED | OUTPATIENT
Start: 2021-01-01 | End: 2021-01-01

## 2021-01-01 RX ORDER — OXYCODONE HYDROCHLORIDE 5 MG/1
5 TABLET ORAL ONCE
Refills: 0 | Status: DISCONTINUED | OUTPATIENT
Start: 2021-01-01 | End: 2021-01-01

## 2021-01-01 RX ORDER — ACETAMINOPHEN 325 MG/1
325 TABLET ORAL
Qty: 1080 | Refills: 0 | Status: ACTIVE | COMMUNITY
Start: 2020-04-27

## 2021-01-01 RX ORDER — METRONIDAZOLE 500 MG
500 TABLET ORAL ONCE
Refills: 0 | Status: COMPLETED | OUTPATIENT
Start: 2021-01-01 | End: 2021-01-01

## 2021-01-01 RX ORDER — VANCOMYCIN HCL 1 G
750 VIAL (EA) INTRAVENOUS EVERY 12 HOURS
Refills: 0 | Status: DISCONTINUED | OUTPATIENT
Start: 2021-01-01 | End: 2021-01-01

## 2021-01-01 RX ORDER — FENTANYL CITRATE 50 UG/ML
25 INJECTION INTRAVENOUS ONCE
Refills: 0 | Status: DISCONTINUED | OUTPATIENT
Start: 2021-01-01 | End: 2021-01-01

## 2021-01-01 RX ORDER — CEFEPIME 1 G/1
2000 INJECTION, POWDER, FOR SOLUTION INTRAMUSCULAR; INTRAVENOUS EVERY 8 HOURS
Refills: 0 | Status: DISCONTINUED | OUTPATIENT
Start: 2021-01-01 | End: 2021-01-01

## 2021-01-01 RX ORDER — METOPROLOL TARTRATE 50 MG
25 TABLET ORAL ONCE
Refills: 0 | Status: COMPLETED | OUTPATIENT
Start: 2021-01-01 | End: 2021-01-01

## 2021-01-01 RX ORDER — INSULIN HUMAN 100 [IU]/ML
5 INJECTION, SOLUTION SUBCUTANEOUS ONCE
Refills: 0 | Status: COMPLETED | OUTPATIENT
Start: 2021-01-01 | End: 2021-01-01

## 2021-01-01 RX ORDER — CALCIUM GLUCONATE 100 MG/ML
1 VIAL (ML) INTRAVENOUS ONCE
Refills: 0 | Status: COMPLETED | OUTPATIENT
Start: 2021-01-01 | End: 2021-01-01

## 2021-01-01 RX ORDER — CEFAZOLIN SODIUM 1 G
2000 VIAL (EA) INJECTION EVERY 8 HOURS
Refills: 0 | Status: DISCONTINUED | OUTPATIENT
Start: 2021-01-01 | End: 2021-01-01

## 2021-01-01 RX ORDER — KETAMINE HYDROCHLORIDE 100 MG/ML
30 INJECTION INTRAMUSCULAR; INTRAVENOUS ONCE
Refills: 0 | Status: DISCONTINUED | OUTPATIENT
Start: 2021-01-01 | End: 2021-01-01

## 2021-01-01 RX ORDER — HYDROMORPHONE HYDROCHLORIDE 2 MG/ML
1 INJECTION INTRAMUSCULAR; INTRAVENOUS; SUBCUTANEOUS ONCE
Refills: 0 | Status: DISCONTINUED | OUTPATIENT
Start: 2021-01-01 | End: 2021-01-01

## 2021-01-01 RX ORDER — FENTANYL CITRATE 50 UG/ML
100 INJECTION INTRAVENOUS ONCE
Refills: 0 | Status: DISCONTINUED | OUTPATIENT
Start: 2021-01-01 | End: 2021-01-01

## 2021-01-01 RX ORDER — NOREPINEPHRINE BITARTRATE/D5W 8 MG/250ML
0.05 PLASTIC BAG, INJECTION (ML) INTRAVENOUS
Qty: 8 | Refills: 0 | Status: DISCONTINUED | OUTPATIENT
Start: 2021-01-01 | End: 2021-01-01

## 2021-01-01 RX ORDER — DEXTROSE 50 % IN WATER 50 %
12.5 SYRINGE (ML) INTRAVENOUS ONCE
Refills: 0 | Status: DISCONTINUED | OUTPATIENT
Start: 2021-01-01 | End: 2021-01-01

## 2021-01-01 RX ORDER — FUROSEMIDE 40 MG
20 TABLET ORAL ONCE
Refills: 0 | Status: COMPLETED | OUTPATIENT
Start: 2021-01-01 | End: 2021-01-01

## 2021-01-01 RX ORDER — WARFARIN SODIUM 2.5 MG/1
3 TABLET ORAL ONCE
Refills: 0 | Status: COMPLETED | OUTPATIENT
Start: 2021-01-01 | End: 2021-01-01

## 2021-01-01 RX ORDER — HYDROMORPHONE HYDROCHLORIDE 2 MG/ML
0.5 INJECTION INTRAMUSCULAR; INTRAVENOUS; SUBCUTANEOUS ONCE
Refills: 0 | Status: DISCONTINUED | OUTPATIENT
Start: 2021-01-01 | End: 2021-01-01

## 2021-01-01 RX ORDER — METOPROLOL TARTRATE 50 MG
5 TABLET ORAL ONCE
Refills: 0 | Status: COMPLETED | OUTPATIENT
Start: 2021-01-01 | End: 2021-01-01

## 2021-01-01 RX ORDER — HUMAN INSULIN 100 [IU]/ML
6 INJECTION, SUSPENSION SUBCUTANEOUS EVERY 6 HOURS
Refills: 0 | Status: DISCONTINUED | OUTPATIENT
Start: 2021-01-01 | End: 2021-01-01

## 2021-01-01 RX ORDER — HYDRALAZINE HCL 50 MG
10 TABLET ORAL ONCE
Refills: 0 | Status: COMPLETED | OUTPATIENT
Start: 2021-01-01 | End: 2021-01-01

## 2021-01-01 RX ORDER — LACTULOSE 10 G/15ML
30 SOLUTION ORAL ONCE
Refills: 0 | Status: COMPLETED | OUTPATIENT
Start: 2021-01-01 | End: 2021-01-01

## 2021-01-01 RX ORDER — DEXMEDETOMIDINE HYDROCHLORIDE IN 0.9% SODIUM CHLORIDE 4 UG/ML
0.5 INJECTION INTRAVENOUS
Qty: 200 | Refills: 0 | Status: DISCONTINUED | OUTPATIENT
Start: 2021-01-01 | End: 2021-01-01

## 2021-01-01 RX ORDER — CLONAZEPAM 1 MG
0.5 TABLET ORAL EVERY 8 HOURS
Refills: 0 | Status: DISCONTINUED | OUTPATIENT
Start: 2021-01-01 | End: 2021-01-01

## 2021-01-01 RX ORDER — CHLORHEXIDINE GLUCONATE 213 G/1000ML
1 SOLUTION TOPICAL
Refills: 0 | Status: DISCONTINUED | OUTPATIENT
Start: 2021-01-01 | End: 2021-01-01

## 2021-01-01 RX ORDER — VECURONIUM BROMIDE 20 MG/1
10 INJECTION, POWDER, FOR SOLUTION INTRAVENOUS ONCE
Refills: 0 | Status: COMPLETED | OUTPATIENT
Start: 2021-01-01 | End: 2021-01-01

## 2021-01-01 RX ORDER — PIPERACILLIN AND TAZOBACTAM 4; .5 G/20ML; G/20ML
3.38 INJECTION, POWDER, LYOPHILIZED, FOR SOLUTION INTRAVENOUS EVERY 8 HOURS
Refills: 0 | Status: DISCONTINUED | OUTPATIENT
Start: 2021-01-01 | End: 2021-01-01

## 2021-01-01 RX ORDER — MIRTAZAPINE 45 MG/1
7.5 TABLET, ORALLY DISINTEGRATING ORAL AT BEDTIME
Refills: 0 | Status: DISCONTINUED | OUTPATIENT
Start: 2021-01-01 | End: 2021-01-01

## 2021-01-01 RX ORDER — INSULIN HUMAN 100 [IU]/ML
2 INJECTION, SOLUTION SUBCUTANEOUS
Qty: 100 | Refills: 0 | Status: DISCONTINUED | OUTPATIENT
Start: 2021-01-01 | End: 2021-01-01

## 2021-01-01 RX ORDER — SERTRALINE 25 MG/1
100 TABLET, FILM COATED ORAL DAILY
Refills: 0 | Status: DISCONTINUED | OUTPATIENT
Start: 2021-01-01 | End: 2021-01-01

## 2021-01-01 RX ORDER — KETAMINE HYDROCHLORIDE 100 MG/ML
0.5 INJECTION INTRAMUSCULAR; INTRAVENOUS
Qty: 1000 | Refills: 0 | Status: DISCONTINUED | OUTPATIENT
Start: 2021-01-01 | End: 2021-01-01

## 2021-01-01 RX ORDER — SODIUM CHLORIDE 9 MG/ML
1000 INJECTION INTRAMUSCULAR; INTRAVENOUS; SUBCUTANEOUS ONCE
Refills: 0 | Status: COMPLETED | OUTPATIENT
Start: 2021-01-01 | End: 2021-01-01

## 2021-01-01 RX ORDER — VANCOMYCIN HCL 1 G
125 VIAL (EA) INTRAVENOUS EVERY 12 HOURS
Refills: 0 | Status: DISCONTINUED | OUTPATIENT
Start: 2021-01-01 | End: 2021-01-01

## 2021-01-01 RX ORDER — ACETAMINOPHEN 500 MG
1000 TABLET ORAL ONCE
Refills: 0 | Status: COMPLETED | OUTPATIENT
Start: 2021-01-01 | End: 2021-01-01

## 2021-01-01 RX ORDER — METOCLOPRAMIDE HCL 10 MG
10 TABLET ORAL
Qty: 0 | Refills: 0 | DISCHARGE
Start: 2021-01-01

## 2021-01-01 RX ORDER — DIPHENHYDRAMINE HCL 50 MG
25 CAPSULE ORAL ONCE
Refills: 0 | Status: DISCONTINUED | OUTPATIENT
Start: 2021-01-01 | End: 2021-01-01

## 2021-01-01 RX ORDER — METOCLOPRAMIDE HCL 10 MG
5 TABLET ORAL EVERY 8 HOURS
Refills: 0 | Status: DISCONTINUED | OUTPATIENT
Start: 2021-01-01 | End: 2021-01-01

## 2021-01-01 RX ORDER — DEXTROSE 50 % IN WATER 50 %
50 SYRINGE (ML) INTRAVENOUS
Refills: 0 | Status: DISCONTINUED | OUTPATIENT
Start: 2021-01-01 | End: 2022-01-01

## 2021-01-01 RX ORDER — HYDRALAZINE HCL 50 MG
25 TABLET ORAL EVERY 8 HOURS
Refills: 0 | Status: DISCONTINUED | OUTPATIENT
Start: 2021-01-01 | End: 2021-01-01

## 2021-01-01 RX ORDER — METOPROLOL TARTRATE 50 MG
1 TABLET ORAL
Qty: 0 | Refills: 0 | DISCHARGE
Start: 2021-01-01

## 2021-01-01 RX ORDER — SIMETHICONE 80 MG/1
80 TABLET, CHEWABLE ORAL EVERY 8 HOURS
Refills: 0 | Status: COMPLETED | OUTPATIENT
Start: 2021-01-01 | End: 2021-01-01

## 2021-01-01 RX ORDER — WARFARIN SODIUM 2.5 MG/1
5 TABLET ORAL ONCE
Refills: 0 | Status: COMPLETED | OUTPATIENT
Start: 2021-01-01 | End: 2021-01-01

## 2021-01-01 RX ORDER — FUROSEMIDE 40 MG
10 TABLET ORAL ONCE
Refills: 0 | Status: COMPLETED | OUTPATIENT
Start: 2021-01-01 | End: 2021-01-01

## 2021-01-01 RX ORDER — LIDOCAINE HCL 20 MG/ML
2 VIAL (ML) INJECTION
Qty: 2 | Refills: 0 | Status: DISCONTINUED | OUTPATIENT
Start: 2021-01-01 | End: 2021-01-01

## 2021-01-01 RX ORDER — MAGNESIUM OXIDE 400 MG ORAL TABLET 241.3 MG
400 TABLET ORAL EVERY 12 HOURS
Refills: 0 | Status: DISCONTINUED | OUTPATIENT
Start: 2021-01-01 | End: 2021-01-01

## 2021-01-01 RX ORDER — PANTOPRAZOLE SODIUM 20 MG/1
40 TABLET, DELAYED RELEASE ORAL DAILY
Refills: 0 | Status: DISCONTINUED | OUTPATIENT
Start: 2021-01-01 | End: 2021-01-01

## 2021-01-01 RX ORDER — ROCURONIUM BROMIDE 10 MG/ML
50 VIAL (ML) INTRAVENOUS ONCE
Refills: 0 | Status: COMPLETED | OUTPATIENT
Start: 2021-01-01 | End: 2021-01-01

## 2021-01-01 RX ORDER — POTASSIUM CHLORIDE 20 MEQ
20 PACKET (EA) ORAL
Refills: 0 | Status: COMPLETED | OUTPATIENT
Start: 2021-01-01 | End: 2021-01-01

## 2021-01-01 RX ORDER — METOCLOPRAMIDE HCL 10 MG
5 TABLET ORAL
Qty: 80 | Refills: 0
Start: 2021-01-01 | End: 2022-01-01

## 2021-01-01 RX ORDER — L. ACIDOPHILUS/L.BULGARICUS 1MM CELL
TABLET ORAL DAILY
Qty: 90 | Refills: 3 | Status: ACTIVE | COMMUNITY
Start: 2021-01-01 | End: 1900-01-01

## 2021-01-01 RX ORDER — VANCOMYCIN HCL 1 G
1000 VIAL (EA) INTRAVENOUS ONCE
Refills: 0 | Status: DISCONTINUED | OUTPATIENT
Start: 2021-01-01 | End: 2021-01-01

## 2021-01-01 RX ORDER — HUMAN INSULIN 100 [IU]/ML
10 INJECTION, SUSPENSION SUBCUTANEOUS EVERY 6 HOURS
Refills: 0 | Status: DISCONTINUED | OUTPATIENT
Start: 2021-01-01 | End: 2021-01-01

## 2021-01-01 RX ORDER — PANTOPRAZOLE SODIUM 20 MG/1
40 TABLET, DELAYED RELEASE ORAL EVERY 12 HOURS
Refills: 0 | Status: DISCONTINUED | OUTPATIENT
Start: 2021-01-01 | End: 2021-01-01

## 2021-01-01 RX ORDER — ENOXAPARIN SODIUM 100 MG/ML
30 INJECTION SUBCUTANEOUS EVERY 12 HOURS
Refills: 0 | Status: DISCONTINUED | OUTPATIENT
Start: 2021-01-01 | End: 2021-01-01

## 2021-01-01 RX ORDER — SIMETHICONE 80 MG/1
80 TABLET, CHEWABLE ORAL EVERY 8 HOURS
Refills: 0 | Status: DISCONTINUED | OUTPATIENT
Start: 2021-01-01 | End: 2021-01-01

## 2021-01-01 RX ORDER — ASPIRIN/CALCIUM CARB/MAGNESIUM 324 MG
81 TABLET ORAL DAILY
Refills: 0 | Status: DISCONTINUED | OUTPATIENT
Start: 2021-01-01 | End: 2021-01-01

## 2021-01-01 RX ORDER — PIPERACILLIN AND TAZOBACTAM 4; .5 G/20ML; G/20ML
3.38 INJECTION, POWDER, LYOPHILIZED, FOR SOLUTION INTRAVENOUS ONCE
Refills: 0 | Status: DISCONTINUED | OUTPATIENT
Start: 2021-01-01 | End: 2021-01-01

## 2021-01-01 RX ORDER — ASPIRIN 81 MG/1
81 TABLET, COATED ORAL DAILY
Qty: 90 | Refills: 3 | Status: ACTIVE | COMMUNITY
Start: 2020-03-15 | End: 1900-01-01

## 2021-01-01 RX ORDER — DOBUTAMINE HCL 250MG/20ML
2 VIAL (ML) INTRAVENOUS
Qty: 500 | Refills: 0 | Status: DISCONTINUED | OUTPATIENT
Start: 2021-01-01 | End: 2021-01-01

## 2021-01-01 RX ORDER — SODIUM CHLORIDE 9 MG/ML
3 INJECTION INTRAMUSCULAR; INTRAVENOUS; SUBCUTANEOUS EVERY 8 HOURS
Refills: 0 | Status: DISCONTINUED | OUTPATIENT
Start: 2021-01-01 | End: 2021-01-01

## 2021-01-01 RX ORDER — ENOXAPARIN SODIUM 100 MG/ML
40 INJECTION SUBCUTANEOUS EVERY 12 HOURS
Refills: 0 | Status: DISCONTINUED | OUTPATIENT
Start: 2021-01-01 | End: 2021-01-01

## 2021-01-01 RX ORDER — PANTOPRAZOLE SODIUM 20 MG/1
40 TABLET, DELAYED RELEASE ORAL
Refills: 0 | Status: DISCONTINUED | OUTPATIENT
Start: 2021-01-01 | End: 2021-01-01

## 2021-01-01 RX ORDER — HUMAN INSULIN 100 [IU]/ML
12 INJECTION, SUSPENSION SUBCUTANEOUS
Refills: 0 | Status: DISCONTINUED | OUTPATIENT
Start: 2021-01-01 | End: 2021-01-01

## 2021-01-01 RX ORDER — DEXTROSE 50 % IN WATER 50 %
12.5 SYRINGE (ML) INTRAVENOUS ONCE
Refills: 0 | Status: COMPLETED | OUTPATIENT
Start: 2021-01-01 | End: 2021-01-01

## 2021-01-01 RX ORDER — HYDROCORTISONE 20 MG
50 TABLET ORAL ONCE
Refills: 0 | Status: DISCONTINUED | OUTPATIENT
Start: 2021-01-01 | End: 2021-01-01

## 2021-01-01 RX ORDER — LIDOCAINE 4 G/100G
1 CREAM TOPICAL DAILY
Refills: 0 | Status: DISCONTINUED | OUTPATIENT
Start: 2021-01-01 | End: 2021-01-01

## 2021-01-01 RX ORDER — ALBUMIN HUMAN 25 %
250 VIAL (ML) INTRAVENOUS ONCE
Refills: 0 | Status: DISCONTINUED | OUTPATIENT
Start: 2021-01-01 | End: 2021-01-01

## 2021-01-01 RX ORDER — SENNA PLUS 8.6 MG/1
2 TABLET ORAL AT BEDTIME
Refills: 0 | Status: DISCONTINUED | OUTPATIENT
Start: 2021-01-01 | End: 2021-01-01

## 2021-01-01 RX ORDER — VANCOMYCIN HCL 1 G
1000 VIAL (EA) INTRAVENOUS EVERY 12 HOURS
Refills: 0 | Status: DISCONTINUED | OUTPATIENT
Start: 2021-01-01 | End: 2021-01-01

## 2021-01-01 RX ORDER — DEXMEDETOMIDINE HYDROCHLORIDE IN 0.9% SODIUM CHLORIDE 4 UG/ML
1.5 INJECTION INTRAVENOUS
Qty: 200 | Refills: 0 | Status: DISCONTINUED | OUTPATIENT
Start: 2021-01-01 | End: 2021-01-01

## 2021-01-01 RX ORDER — HALOPERIDOL DECANOATE 100 MG/ML
5 INJECTION INTRAMUSCULAR ONCE
Refills: 0 | Status: COMPLETED | OUTPATIENT
Start: 2021-01-01 | End: 2021-01-01

## 2021-01-01 RX ORDER — SODIUM CHLORIDE 9 MG/ML
250 INJECTION INTRAMUSCULAR; INTRAVENOUS; SUBCUTANEOUS ONCE
Refills: 0 | Status: COMPLETED | OUTPATIENT
Start: 2021-01-01 | End: 2021-01-01

## 2021-01-01 RX ORDER — ETOMIDATE 2 MG/ML
10 INJECTION INTRAVENOUS ONCE
Refills: 0 | Status: COMPLETED | OUTPATIENT
Start: 2021-01-01 | End: 2021-01-01

## 2021-01-01 RX ORDER — MIRTAZAPINE 45 MG/1
7.5 TABLET, ORALLY DISINTEGRATING ORAL DAILY
Refills: 0 | Status: DISCONTINUED | OUTPATIENT
Start: 2021-01-01 | End: 2021-01-01

## 2021-01-01 RX ORDER — PANTOPRAZOLE SODIUM 20 MG/1
40 TABLET, DELAYED RELEASE ORAL EVERY 12 HOURS
Refills: 0 | Status: DISCONTINUED | OUTPATIENT
Start: 2021-01-01 | End: 2022-01-01

## 2021-01-01 RX ORDER — METOPROLOL TARTRATE 50 MG
12.5 TABLET ORAL EVERY 12 HOURS
Refills: 0 | Status: DISCONTINUED | OUTPATIENT
Start: 2021-01-01 | End: 2021-01-01

## 2021-01-01 RX ORDER — SODIUM CHLORIDE 9 MG/ML
1000 INJECTION INTRAMUSCULAR; INTRAVENOUS; SUBCUTANEOUS
Refills: 0 | Status: DISCONTINUED | OUTPATIENT
Start: 2021-01-01 | End: 2021-01-01

## 2021-01-01 RX ORDER — SODIUM CHLORIDE 9 MG/ML
250 INJECTION, SOLUTION INTRAVENOUS ONCE
Refills: 0 | Status: COMPLETED | OUTPATIENT
Start: 2021-01-01 | End: 2021-01-01

## 2021-01-01 RX ORDER — WARFARIN SODIUM 2.5 MG/1
6 TABLET ORAL
Refills: 0 | Status: COMPLETED | OUTPATIENT
Start: 2021-01-01 | End: 2021-01-01

## 2021-01-01 RX ORDER — METRONIDAZOLE 500 MG
500 TABLET ORAL EVERY 8 HOURS
Refills: 0 | Status: COMPLETED | OUTPATIENT
Start: 2021-01-01 | End: 2021-01-01

## 2021-01-01 RX ORDER — LIDOCAINE HCL 20 MG/ML
100 VIAL (ML) INJECTION ONCE
Refills: 0 | Status: COMPLETED | OUTPATIENT
Start: 2021-01-01 | End: 2021-01-01

## 2021-01-01 RX ORDER — SODIUM CHLORIDE 9 MG/ML
1000 INJECTION INTRAMUSCULAR; INTRAVENOUS; SUBCUTANEOUS
Refills: 0 | Status: DISCONTINUED | OUTPATIENT
Start: 2021-01-01 | End: 2022-01-01

## 2021-01-01 RX ORDER — HYDROMORPHONE HYDROCHLORIDE 2 MG/ML
0.25 INJECTION INTRAMUSCULAR; INTRAVENOUS; SUBCUTANEOUS ONCE
Refills: 0 | Status: DISCONTINUED | OUTPATIENT
Start: 2021-01-01 | End: 2021-01-01

## 2021-01-01 RX ORDER — REMDESIVIR 5 MG/ML
200 INJECTION INTRAVENOUS EVERY 24 HOURS
Refills: 0 | Status: COMPLETED | OUTPATIENT
Start: 2021-01-01 | End: 2021-01-01

## 2021-01-01 RX ORDER — HYDRALAZINE HCL 50 MG
20 TABLET ORAL THREE TIMES A DAY
Refills: 0 | Status: DISCONTINUED | OUTPATIENT
Start: 2021-01-01 | End: 2021-01-01

## 2021-01-01 RX ORDER — ESMOLOL HCL 100MG/10ML
100 VIAL (ML) INTRAVENOUS
Qty: 2500 | Refills: 0 | Status: DISCONTINUED | OUTPATIENT
Start: 2021-01-01 | End: 2021-01-01

## 2021-01-01 RX ORDER — CLOPIDOGREL BISULFATE 75 MG/1
75 TABLET, FILM COATED ORAL DAILY
Refills: 0 | Status: DISCONTINUED | OUTPATIENT
Start: 2021-01-01 | End: 2021-01-01

## 2021-01-01 RX ORDER — ASPIRIN/CALCIUM CARB/MAGNESIUM 324 MG
1 TABLET ORAL
Qty: 0 | Refills: 0 | DISCHARGE
Start: 2021-01-01

## 2021-01-01 RX ORDER — VANCOMYCIN HCL 1 G
250 VIAL (EA) INTRAVENOUS EVERY 6 HOURS
Refills: 0 | Status: DISCONTINUED | OUTPATIENT
Start: 2021-01-01 | End: 2021-01-01

## 2021-01-01 RX ORDER — ENOXAPARIN SODIUM 100 MG/ML
60 INJECTION SUBCUTANEOUS EVERY 12 HOURS
Refills: 0 | Status: DISCONTINUED | OUTPATIENT
Start: 2021-01-01 | End: 2021-01-01

## 2021-01-01 RX ORDER — TRIAMCINOLONE ACETONIDE 0.25 MG/G
0.03 CREAM TOPICAL
Qty: 1 | Refills: 0 | Status: ACTIVE | COMMUNITY
Start: 2020-12-03 | End: 1900-01-01

## 2021-01-01 RX ORDER — HUMAN INSULIN 100 [IU]/ML
4 INJECTION, SUSPENSION SUBCUTANEOUS EVERY 6 HOURS
Refills: 0 | Status: DISCONTINUED | OUTPATIENT
Start: 2021-01-01 | End: 2021-01-01

## 2021-01-01 RX ORDER — ALBUMIN HUMAN 25 %
250 VIAL (ML) INTRAVENOUS
Refills: 0 | Status: COMPLETED | OUTPATIENT
Start: 2021-01-01 | End: 2021-01-01

## 2021-01-01 RX ORDER — CALCIUM CHLORIDE
1000 POWDER (GRAM) MISCELLANEOUS ONCE
Refills: 0 | Status: COMPLETED | OUTPATIENT
Start: 2021-01-01 | End: 2021-01-01

## 2021-01-01 RX ORDER — VECURONIUM BROMIDE 20 MG/1
5 INJECTION, POWDER, FOR SOLUTION INTRAVENOUS ONCE
Refills: 0 | Status: COMPLETED | OUTPATIENT
Start: 2021-01-01 | End: 2021-01-01

## 2021-01-01 RX ORDER — VASOPRESSIN 20 [USP'U]/ML
0.1 INJECTION INTRAVENOUS
Qty: 50 | Refills: 0 | Status: DISCONTINUED | OUTPATIENT
Start: 2021-01-01 | End: 2021-01-01

## 2021-01-01 RX ORDER — HYDRALAZINE HCL 50 MG
5 TABLET ORAL ONCE
Refills: 0 | Status: COMPLETED | OUTPATIENT
Start: 2021-01-01 | End: 2021-01-01

## 2021-01-01 RX ORDER — CLONAZEPAM 1 MG
0.25 TABLET ORAL EVERY 8 HOURS
Refills: 0 | Status: DISCONTINUED | OUTPATIENT
Start: 2021-01-01 | End: 2021-01-01

## 2021-01-01 RX ORDER — METOCLOPRAMIDE 5 MG/1
5 TABLET ORAL 4 TIMES DAILY
Qty: 360 | Refills: 0 | Status: ACTIVE | COMMUNITY
Start: 2021-01-01 | End: 1900-01-01

## 2021-01-01 RX ORDER — CLONAZEPAM 1 MG
0.5 TABLET ORAL AT BEDTIME
Refills: 0 | Status: DISCONTINUED | OUTPATIENT
Start: 2021-01-01 | End: 2021-01-01

## 2021-01-01 RX ORDER — METOPROLOL TARTRATE 50 MG
50 TABLET ORAL DAILY
Refills: 0 | Status: DISCONTINUED | OUTPATIENT
Start: 2021-01-01 | End: 2021-01-01

## 2021-01-01 RX ORDER — METOCLOPRAMIDE HCL 10 MG
10 TABLET ORAL EVERY 8 HOURS
Refills: 0 | Status: COMPLETED | OUTPATIENT
Start: 2021-01-01 | End: 2021-01-01

## 2021-01-01 RX ORDER — UREA 15 G
15 POWDER IN PACKET (EA) ORAL DAILY
Refills: 0 | Status: DISCONTINUED | OUTPATIENT
Start: 2021-01-01 | End: 2021-01-01

## 2021-01-01 RX ORDER — WARFARIN SODIUM 2.5 MG/1
5 TABLET ORAL ONCE
Refills: 0 | Status: DISCONTINUED | OUTPATIENT
Start: 2021-01-01 | End: 2021-01-01

## 2021-01-01 RX ORDER — MAGNESIUM SULFATE 500 MG/ML
2 VIAL (ML) INJECTION DAILY
Refills: 0 | Status: COMPLETED | OUTPATIENT
Start: 2021-01-01 | End: 2021-01-01

## 2021-01-01 RX ORDER — DEXTROSE 50 % IN WATER 50 %
25 SYRINGE (ML) INTRAVENOUS ONCE
Refills: 0 | Status: COMPLETED | OUTPATIENT
Start: 2021-01-01 | End: 2021-01-01

## 2021-01-01 RX ORDER — DEXTROSE 50 % IN WATER 50 %
25 SYRINGE (ML) INTRAVENOUS ONCE
Refills: 0 | Status: DISCONTINUED | OUTPATIENT
Start: 2021-01-01 | End: 2021-01-01

## 2021-01-01 RX ORDER — SODIUM,POTASSIUM PHOSPHATES 278-250MG
1 POWDER IN PACKET (EA) ORAL
Refills: 0 | Status: DISCONTINUED | OUTPATIENT
Start: 2021-01-01 | End: 2022-01-01

## 2021-01-01 RX ORDER — ALBUMIN HUMAN 25 %
50 VIAL (ML) INTRAVENOUS
Refills: 0 | Status: COMPLETED | OUTPATIENT
Start: 2021-01-01 | End: 2021-01-01

## 2021-01-01 RX ORDER — TOBRAMYCIN SULFATE 40 MG/ML
300 VIAL (ML) INJECTION EVERY 12 HOURS
Refills: 0 | Status: DISCONTINUED | OUTPATIENT
Start: 2021-01-01 | End: 2021-01-01

## 2021-01-01 RX ORDER — CEFEPIME 1 G/1
INJECTION, POWDER, FOR SOLUTION INTRAMUSCULAR; INTRAVENOUS
Refills: 0 | Status: DISCONTINUED | OUTPATIENT
Start: 2021-01-01 | End: 2021-01-01

## 2021-01-01 RX ORDER — CEFEPIME 1 G/1
1000 INJECTION, POWDER, FOR SOLUTION INTRAMUSCULAR; INTRAVENOUS EVERY 8 HOURS
Refills: 0 | Status: COMPLETED | OUTPATIENT
Start: 2021-01-01 | End: 2021-01-01

## 2021-01-01 RX ORDER — HYDROCORTISONE 20 MG
50 TABLET ORAL EVERY 8 HOURS
Refills: 0 | Status: DISCONTINUED | OUTPATIENT
Start: 2021-01-01 | End: 2021-01-01

## 2021-01-01 RX ORDER — SCOPALAMINE 1 MG/3D
1 PATCH, EXTENDED RELEASE TRANSDERMAL
Refills: 0 | Status: DISCONTINUED | OUTPATIENT
Start: 2021-01-01 | End: 2021-01-01

## 2021-01-01 RX ORDER — POTASSIUM PHOSPHATE, MONOBASIC POTASSIUM PHOSPHATE, DIBASIC 236; 224 MG/ML; MG/ML
15 INJECTION, SOLUTION INTRAVENOUS ONCE
Refills: 0 | Status: COMPLETED | OUTPATIENT
Start: 2021-01-01 | End: 2021-01-01

## 2021-01-01 RX ORDER — CLONAZEPAM 1 MG
0.25 TABLET ORAL AT BEDTIME
Refills: 0 | Status: DISCONTINUED | OUTPATIENT
Start: 2021-01-01 | End: 2021-01-01

## 2021-01-01 RX ORDER — DEXTROSE 50 % IN WATER 50 %
50 SYRINGE (ML) INTRAVENOUS
Refills: 0 | Status: DISCONTINUED | OUTPATIENT
Start: 2021-01-01 | End: 2021-01-01

## 2021-01-01 RX ORDER — CIPROFLOXACIN LACTATE 400MG/40ML
500 VIAL (ML) INTRAVENOUS EVERY 12 HOURS
Refills: 0 | Status: DISCONTINUED | OUTPATIENT
Start: 2021-01-01 | End: 2021-01-01

## 2021-01-01 RX ORDER — VANCOMYCIN HCL 1 G
1000 VIAL (EA) INTRAVENOUS EVERY 12 HOURS
Refills: 0 | Status: COMPLETED | OUTPATIENT
Start: 2021-01-01 | End: 2021-01-01

## 2021-01-01 RX ORDER — HYDROCORTISONE 20 MG
25 TABLET ORAL DAILY
Refills: 0 | Status: DISCONTINUED | OUTPATIENT
Start: 2021-01-01 | End: 2021-01-01

## 2021-01-01 RX ORDER — METHIMAZOLE 10 MG/1
1 TABLET ORAL
Qty: 30 | Refills: 0
Start: 2021-01-01 | End: 2022-01-01

## 2021-01-01 RX ORDER — CEFEPIME 1 G/1
1000 INJECTION, POWDER, FOR SOLUTION INTRAMUSCULAR; INTRAVENOUS EVERY 8 HOURS
Refills: 0 | Status: DISCONTINUED | OUTPATIENT
Start: 2021-01-01 | End: 2021-01-01

## 2021-01-01 RX ORDER — CIPROFLOXACIN HYDROCHLORIDE 500 MG/1
500 TABLET, FILM COATED ORAL
Qty: 180 | Refills: 3 | Status: ACTIVE | COMMUNITY
Start: 2021-01-01 | End: 1900-01-01

## 2021-01-01 RX ORDER — MEROPENEM 1 G/30ML
1000 INJECTION INTRAVENOUS EVERY 8 HOURS
Refills: 0 | Status: COMPLETED | OUTPATIENT
Start: 2021-01-01 | End: 2021-01-01

## 2021-01-01 RX ORDER — ALBUMIN HUMAN 25 %
50 VIAL (ML) INTRAVENOUS
Refills: 0 | Status: DISCONTINUED | OUTPATIENT
Start: 2021-01-01 | End: 2021-01-01

## 2021-01-01 RX ORDER — IPRATROPIUM BROMIDE 0.2 MG/ML
500 SOLUTION, NON-ORAL INHALATION EVERY 6 HOURS
Refills: 0 | Status: DISCONTINUED | OUTPATIENT
Start: 2021-01-01 | End: 2021-01-01

## 2021-01-01 RX ORDER — HYDROCORTISONE 20 MG
25 TABLET ORAL DAILY
Refills: 0 | Status: CANCELLED | OUTPATIENT
Start: 2021-01-01 | End: 2021-01-01

## 2021-01-01 RX ORDER — AMIODARONE HYDROCHLORIDE 400 MG/1
200 TABLET ORAL DAILY
Refills: 0 | Status: DISCONTINUED | OUTPATIENT
Start: 2021-01-01 | End: 2021-01-01

## 2021-01-01 RX ORDER — PHENYLEPHRINE HYDROCHLORIDE 10 MG/ML
0.3 INJECTION INTRAVENOUS
Qty: 40 | Refills: 0 | Status: DISCONTINUED | OUTPATIENT
Start: 2021-01-01 | End: 2021-01-01

## 2021-01-01 RX ORDER — EPINEPHRINE 0.3 MG/.3ML
0.01 INJECTION INTRAMUSCULAR; SUBCUTANEOUS
Qty: 4 | Refills: 0 | Status: DISCONTINUED | OUTPATIENT
Start: 2021-01-01 | End: 2021-01-01

## 2021-01-01 RX ORDER — FOSAPREPITANT DIMEGLUMINE 150 MG/5ML
150 INJECTION, POWDER, LYOPHILIZED, FOR SOLUTION INTRAVENOUS ONCE
Refills: 0 | Status: COMPLETED | OUTPATIENT
Start: 2021-01-01 | End: 2021-01-01

## 2021-01-01 RX ORDER — INSULIN LISPRO 100/ML
VIAL (ML) SUBCUTANEOUS EVERY 6 HOURS
Refills: 0 | Status: DISCONTINUED | OUTPATIENT
Start: 2021-01-01 | End: 2021-01-01

## 2021-01-01 RX ORDER — VANCOMYCIN HCL 1 G
125 VIAL (EA) INTRAVENOUS
Qty: 0 | Refills: 0 | DISCHARGE
Start: 2021-01-01

## 2021-01-01 RX ORDER — VASOPRESSIN 20 [USP'U]/ML
0.07 INJECTION INTRAVENOUS
Qty: 50 | Refills: 0 | Status: DISCONTINUED | OUTPATIENT
Start: 2021-01-01 | End: 2021-01-01

## 2021-01-01 RX ORDER — METRONIDAZOLE 500 MG
500 TABLET ORAL EVERY 8 HOURS
Refills: 0 | Status: DISCONTINUED | OUTPATIENT
Start: 2021-01-01 | End: 2021-01-01

## 2021-01-01 RX ORDER — FUROSEMIDE 40 MG
40 TABLET ORAL DAILY
Refills: 0 | Status: DISCONTINUED | OUTPATIENT
Start: 2021-01-01 | End: 2021-01-01

## 2021-01-01 RX ORDER — GLUCAGON INJECTION, SOLUTION 0.5 MG/.1ML
1 INJECTION, SOLUTION SUBCUTANEOUS ONCE
Refills: 0 | Status: DISCONTINUED | OUTPATIENT
Start: 2021-01-01 | End: 2021-01-01

## 2021-01-01 RX ORDER — HYDROCORTISONE 20 MG
60 TABLET ORAL EVERY 12 HOURS
Refills: 0 | Status: DISCONTINUED | OUTPATIENT
Start: 2021-01-01 | End: 2021-01-01

## 2021-01-01 RX ORDER — METOPROLOL TARTRATE 50 MG
12.5 TABLET ORAL EVERY 8 HOURS
Refills: 0 | Status: DISCONTINUED | OUTPATIENT
Start: 2021-01-01 | End: 2021-01-01

## 2021-01-01 RX ORDER — PROCHLORPERAZINE MALEATE 5 MG
10 TABLET ORAL THREE TIMES A DAY
Refills: 0 | Status: DISCONTINUED | OUTPATIENT
Start: 2021-01-01 | End: 2021-01-01

## 2021-01-01 RX ORDER — VASOPRESSIN 20 [USP'U]/ML
0.04 INJECTION INTRAVENOUS
Qty: 50 | Refills: 0 | Status: DISCONTINUED | OUTPATIENT
Start: 2021-01-01 | End: 2021-01-01

## 2021-01-01 RX ORDER — FLUCONAZOLE 150 MG/1
TABLET ORAL
Refills: 0 | Status: DISCONTINUED | OUTPATIENT
Start: 2021-01-01 | End: 2021-01-01

## 2021-01-01 RX ORDER — ASPIRIN/CALCIUM CARB/MAGNESIUM 324 MG
81 TABLET ORAL DAILY
Refills: 0 | Status: DISCONTINUED | OUTPATIENT
Start: 2021-01-01 | End: 2022-01-01

## 2021-01-01 RX ORDER — DEXTROSE 50 % IN WATER 50 %
50 SYRINGE (ML) INTRAVENOUS ONCE
Refills: 0 | Status: COMPLETED | OUTPATIENT
Start: 2021-01-01 | End: 2021-01-01

## 2021-01-01 RX ORDER — METOCLOPRAMIDE HCL 10 MG
5 TABLET ORAL
Qty: 0 | Refills: 0 | DISCHARGE
Start: 2021-01-01

## 2021-01-01 RX ORDER — FLUCONAZOLE 150 MG/1
100 TABLET ORAL EVERY 24 HOURS
Refills: 0 | Status: DISCONTINUED | OUTPATIENT
Start: 2021-01-01 | End: 2021-01-01

## 2021-01-01 RX ORDER — HEPARIN SODIUM 5000 [USP'U]/ML
400 INJECTION INTRAVENOUS; SUBCUTANEOUS
Qty: 25000 | Refills: 0 | Status: DISCONTINUED | OUTPATIENT
Start: 2021-01-01 | End: 2021-01-01

## 2021-01-01 RX ORDER — LACTOBACILLUS ACIDOPHILUS 100MM CELL
1 CAPSULE ORAL DAILY
Refills: 0 | Status: DISCONTINUED | OUTPATIENT
Start: 2021-01-01 | End: 2021-01-01

## 2021-01-01 RX ORDER — ACETAMINOPHEN 500 MG
650 TABLET ORAL EVERY 6 HOURS
Refills: 0 | Status: DISCONTINUED | OUTPATIENT
Start: 2021-01-01 | End: 2022-01-01

## 2021-01-01 RX ORDER — VANCOMYCIN HCL 1 G
500 VIAL (EA) INTRAVENOUS EVERY 12 HOURS
Refills: 0 | Status: DISCONTINUED | OUTPATIENT
Start: 2021-01-01 | End: 2021-01-01

## 2021-01-01 RX ORDER — HYDROCORTISONE 20 MG
25 TABLET ORAL
Refills: 0 | Status: COMPLETED | OUTPATIENT
Start: 2021-01-01 | End: 2021-01-01

## 2021-01-01 RX ORDER — MAGNESIUM OXIDE 241.3 MG/1000MG
400 TABLET ORAL TWICE DAILY
Qty: 180 | Refills: 3 | Status: ACTIVE | COMMUNITY
Start: 2021-01-01 | End: 1900-01-01

## 2021-01-01 RX ORDER — LACTOBACILLUS ACIDOPHILUS 100MM CELL
0 CAPSULE ORAL
Qty: 0 | Refills: 0 | DISCHARGE
Start: 2021-01-01

## 2021-01-01 RX ORDER — SERTRALINE 25 MG/1
100 TABLET, FILM COATED ORAL DAILY
Refills: 0 | Status: DISCONTINUED | OUTPATIENT
Start: 2021-01-01 | End: 2022-01-01

## 2021-01-01 RX ORDER — VANCOMYCIN HCL 1 G
500 VIAL (EA) INTRAVENOUS ONCE
Refills: 0 | Status: COMPLETED | OUTPATIENT
Start: 2021-01-01 | End: 2021-01-01

## 2021-01-01 RX ORDER — HEPARIN SODIUM 5000 [USP'U]/ML
5000 INJECTION INTRAVENOUS; SUBCUTANEOUS EVERY 8 HOURS
Refills: 0 | Status: DISCONTINUED | OUTPATIENT
Start: 2021-01-01 | End: 2021-01-01

## 2021-01-01 RX ORDER — HYDROCORTISONE 20 MG
100 TABLET ORAL EVERY 8 HOURS
Refills: 0 | Status: DISCONTINUED | OUTPATIENT
Start: 2021-01-01 | End: 2021-01-01

## 2021-01-01 RX ORDER — METOPROLOL TARTRATE 50 MG
25 TABLET ORAL DAILY
Refills: 0 | Status: DISCONTINUED | OUTPATIENT
Start: 2021-01-01 | End: 2021-01-01

## 2021-01-01 RX ORDER — METOPROLOL TARTRATE 50 MG
25 TABLET ORAL THREE TIMES A DAY
Refills: 0 | Status: DISCONTINUED | OUTPATIENT
Start: 2021-01-01 | End: 2021-01-01

## 2021-01-01 RX ORDER — PANTOPRAZOLE SODIUM 20 MG/1
1 TABLET, DELAYED RELEASE ORAL
Qty: 0 | Refills: 0 | DISCHARGE
Start: 2021-01-01

## 2021-01-01 RX ORDER — INSULIN LISPRO 100/ML
VIAL (ML) SUBCUTANEOUS AT BEDTIME
Refills: 0 | Status: DISCONTINUED | OUTPATIENT
Start: 2021-01-01 | End: 2021-01-01

## 2021-01-01 RX ORDER — DEXTROSE MONOHYDRATE, SODIUM CHLORIDE, AND POTASSIUM CHLORIDE 50; .745; 4.5 G/1000ML; G/1000ML; G/1000ML
1000 INJECTION, SOLUTION INTRAVENOUS
Refills: 0 | Status: DISCONTINUED | OUTPATIENT
Start: 2021-01-01 | End: 2021-01-01

## 2021-01-01 RX ORDER — FLUCONAZOLE 150 MG/1
100 TABLET ORAL ONCE
Refills: 0 | Status: DISCONTINUED | OUTPATIENT
Start: 2021-01-01 | End: 2021-01-01

## 2021-01-01 RX ORDER — HYDRALAZINE HCL 50 MG
20 TABLET ORAL EVERY 8 HOURS
Refills: 0 | Status: DISCONTINUED | OUTPATIENT
Start: 2021-01-01 | End: 2021-01-01

## 2021-01-01 RX ORDER — MIRTAZAPINE 7.5 MG/1
7.5 TABLET, FILM COATED ORAL
Qty: 90 | Refills: 3 | Status: ACTIVE | COMMUNITY
Start: 2021-01-01 | End: 1900-01-01

## 2021-01-01 RX ORDER — METOPROLOL TARTRATE 50 MG
25 TABLET ORAL ONCE
Refills: 0 | Status: DISCONTINUED | OUTPATIENT
Start: 2021-01-01 | End: 2021-01-01

## 2021-01-01 RX ORDER — VANCOMYCIN HCL 1 G
VIAL (EA) INTRAVENOUS
Refills: 0 | Status: DISCONTINUED | OUTPATIENT
Start: 2021-01-01 | End: 2021-01-01

## 2021-01-01 RX ORDER — FENTANYL CITRATE 50 UG/ML
50 INJECTION INTRAVENOUS ONCE
Refills: 0 | Status: DISCONTINUED | OUTPATIENT
Start: 2021-01-01 | End: 2021-01-01

## 2021-01-01 RX ORDER — SUCCINYLCHOLINE CHLORIDE 100 MG/5ML
5 SYRINGE (ML) INTRAVENOUS ONCE
Refills: 0 | Status: COMPLETED | OUTPATIENT
Start: 2021-01-01 | End: 2021-01-01

## 2021-01-01 RX ORDER — SODIUM BICARBONATE 1 MEQ/ML
50 SYRINGE (ML) INTRAVENOUS ONCE
Refills: 0 | Status: COMPLETED | OUTPATIENT
Start: 2021-01-01 | End: 2021-01-01

## 2021-01-01 RX ORDER — VANCOMYCIN HCL 1 G
125 VIAL (EA) INTRAVENOUS EVERY 6 HOURS
Refills: 0 | Status: DISCONTINUED | OUTPATIENT
Start: 2021-01-01 | End: 2021-01-01

## 2021-01-01 RX ORDER — CHOLESTYRAMINE 4 G/9G
4 POWDER, FOR SUSPENSION ORAL EVERY 12 HOURS
Refills: 0 | Status: DISCONTINUED | OUTPATIENT
Start: 2021-01-01 | End: 2021-01-01

## 2021-01-01 RX ORDER — SIMETHICONE 80 MG/1
80 TABLET, CHEWABLE ORAL ONCE
Refills: 0 | Status: COMPLETED | OUTPATIENT
Start: 2021-01-01 | End: 2021-01-01

## 2021-01-01 RX ORDER — METOPROLOL TARTRATE 50 MG
2.5 TABLET ORAL ONCE
Refills: 0 | Status: COMPLETED | OUTPATIENT
Start: 2021-01-01 | End: 2021-01-01

## 2021-01-01 RX ORDER — MEROPENEM 1 G/30ML
INJECTION INTRAVENOUS
Refills: 0 | Status: DISCONTINUED | OUTPATIENT
Start: 2021-01-01 | End: 2021-01-01

## 2021-01-01 RX ORDER — METOPROLOL TARTRATE 50 MG
25 TABLET ORAL EVERY 12 HOURS
Refills: 0 | Status: DISCONTINUED | OUTPATIENT
Start: 2021-01-01 | End: 2021-01-01

## 2021-01-01 RX ORDER — MEPERIDINE HYDROCHLORIDE 50 MG/ML
25 INJECTION INTRAMUSCULAR; INTRAVENOUS; SUBCUTANEOUS ONCE
Refills: 0 | Status: DISCONTINUED | OUTPATIENT
Start: 2021-01-01 | End: 2021-01-01

## 2021-01-01 RX ORDER — ACETAMINOPHEN 500 MG
650 TABLET ORAL EVERY 6 HOURS
Refills: 0 | Status: DISCONTINUED | OUTPATIENT
Start: 2021-01-01 | End: 2021-01-01

## 2021-01-01 RX ORDER — REMDESIVIR 5 MG/ML
100 INJECTION INTRAVENOUS EVERY 24 HOURS
Refills: 0 | Status: COMPLETED | OUTPATIENT
Start: 2021-01-01 | End: 2021-01-01

## 2021-01-01 RX ORDER — CLONAZEPAM 1 MG
0.25 TABLET ORAL EVERY 24 HOURS
Refills: 0 | Status: DISCONTINUED | OUTPATIENT
Start: 2021-01-01 | End: 2021-01-01

## 2021-01-01 RX ORDER — POTASSIUM CHLORIDE 20 MEQ
40 PACKET (EA) ORAL ONCE
Refills: 0 | Status: DISCONTINUED | OUTPATIENT
Start: 2021-01-01 | End: 2021-01-01

## 2021-01-01 RX ORDER — HYDROCORTISONE 20 MG
50 TABLET ORAL
Refills: 0 | Status: DISCONTINUED | OUTPATIENT
Start: 2021-01-01 | End: 2021-01-01

## 2021-01-01 RX ORDER — SODIUM CHLORIDE 9 MG/ML
500 INJECTION, SOLUTION INTRAVENOUS ONCE
Refills: 0 | Status: COMPLETED | OUTPATIENT
Start: 2021-01-01 | End: 2021-01-01

## 2021-01-01 RX ORDER — ETOMIDATE 2 MG/ML
40 INJECTION INTRAVENOUS ONCE
Refills: 0 | Status: COMPLETED | OUTPATIENT
Start: 2021-01-01 | End: 2021-01-01

## 2021-01-01 RX ORDER — MAGNESIUM OXIDE 400 MG ORAL TABLET 241.3 MG
1 TABLET ORAL
Qty: 0 | Refills: 0 | DISCHARGE
Start: 2021-01-01

## 2021-01-01 RX ORDER — DOBUTAMINE HCL 250MG/20ML
2.5 VIAL (ML) INTRAVENOUS
Qty: 500 | Refills: 0 | Status: DISCONTINUED | OUTPATIENT
Start: 2021-01-01 | End: 2021-01-01

## 2021-01-01 RX ORDER — INSULIN LISPRO 100/ML
VIAL (ML) SUBCUTANEOUS
Refills: 0 | Status: DISCONTINUED | OUTPATIENT
Start: 2021-01-01 | End: 2021-01-01

## 2021-01-01 RX ORDER — METRONIDAZOLE 500 MG
TABLET ORAL
Refills: 0 | Status: DISCONTINUED | OUTPATIENT
Start: 2021-01-01 | End: 2021-01-01

## 2021-01-01 RX ORDER — CHLORHEXIDINE GLUCONATE 213 G/1000ML
15 SOLUTION TOPICAL
Refills: 0 | Status: DISCONTINUED | OUTPATIENT
Start: 2021-01-01 | End: 2021-01-01

## 2021-01-01 RX ORDER — WARFARIN 5 MG/1
5 TABLET ORAL
Refills: 0 | Status: DISCONTINUED | COMMUNITY
Start: 2018-09-19 | End: 2021-01-01

## 2021-01-01 RX ORDER — SODIUM CHLORIDE 9 MG/ML
250 INJECTION INTRAMUSCULAR; INTRAVENOUS; SUBCUTANEOUS
Refills: 0 | Status: COMPLETED | OUTPATIENT
Start: 2021-01-01 | End: 2021-01-01

## 2021-01-01 RX ORDER — HYDRALAZINE HCL 50 MG
10 TABLET ORAL THREE TIMES A DAY
Refills: 0 | Status: DISCONTINUED | OUTPATIENT
Start: 2021-01-01 | End: 2021-01-01

## 2021-01-01 RX ORDER — FENTANYL CITRATE 50 UG/ML
0.5 INJECTION INTRAVENOUS
Qty: 2500 | Refills: 0 | Status: DISCONTINUED | OUTPATIENT
Start: 2021-01-01 | End: 2021-01-01

## 2021-01-01 RX ORDER — METHIMAZOLE 10 MG/1
1 TABLET ORAL
Qty: 0 | Refills: 0 | DISCHARGE
Start: 2021-01-01

## 2021-01-01 RX ORDER — HYDROCORTISONE 20 MG
100 TABLET ORAL ONCE
Refills: 0 | Status: COMPLETED | OUTPATIENT
Start: 2021-01-01 | End: 2021-01-01

## 2021-01-01 RX ORDER — CHLORHEXIDINE GLUCONATE 213 G/1000ML
1 SOLUTION TOPICAL
Refills: 0 | Status: DISCONTINUED | OUTPATIENT
Start: 2021-01-01 | End: 2022-01-01

## 2021-01-01 RX ORDER — ACETYLCYSTEINE 200 MG/ML
3 VIAL (ML) MISCELLANEOUS EVERY 6 HOURS
Refills: 0 | Status: COMPLETED | OUTPATIENT
Start: 2021-01-01 | End: 2021-01-01

## 2021-01-01 RX ORDER — FENTANYL CITRATE 50 UG/ML
12.5 INJECTION INTRAVENOUS
Refills: 0 | Status: DISCONTINUED | OUTPATIENT
Start: 2021-01-01 | End: 2021-01-01

## 2021-01-01 RX ORDER — VANCOMYCIN HCL 1 G
5 VIAL (EA) INTRAVENOUS
Qty: 300 | Refills: 0
Start: 2021-01-01 | End: 2022-01-01

## 2021-01-01 RX ORDER — SODIUM CHLORIDE 9 MG/ML
500 INJECTION INTRAMUSCULAR; INTRAVENOUS; SUBCUTANEOUS
Refills: 0 | Status: COMPLETED | OUTPATIENT
Start: 2021-01-01 | End: 2021-01-01

## 2021-01-01 RX ORDER — PANTOPRAZOLE 40 MG/1
40 TABLET, DELAYED RELEASE ORAL DAILY
Qty: 90 | Refills: 3 | Status: ACTIVE | COMMUNITY
Start: 2021-01-01 | End: 1900-01-01

## 2021-01-01 RX ORDER — LISINOPRIL 5 MG/1
5 TABLET ORAL
Qty: 90 | Refills: 1 | Status: DISCONTINUED | COMMUNITY
Start: 2020-05-07 | End: 2021-01-01

## 2021-01-01 RX ORDER — DRONABINOL 2.5 MG
2.5 CAPSULE ORAL
Refills: 0 | Status: DISCONTINUED | OUTPATIENT
Start: 2021-01-01 | End: 2021-01-01

## 2021-01-01 RX ORDER — OCTREOTIDE ACETATE 200 UG/ML
50 INJECTION, SOLUTION INTRAVENOUS; SUBCUTANEOUS EVERY 8 HOURS
Refills: 0 | Status: DISCONTINUED | OUTPATIENT
Start: 2021-01-01 | End: 2021-01-01

## 2021-01-01 RX ORDER — HEPARIN SODIUM 5000 [USP'U]/ML
500 INJECTION INTRAVENOUS; SUBCUTANEOUS
Qty: 25000 | Refills: 0 | Status: DISCONTINUED | OUTPATIENT
Start: 2021-01-01 | End: 2021-01-01

## 2021-01-01 RX ORDER — HYDRALAZINE HCL 50 MG
1 TABLET ORAL
Qty: 90 | Refills: 0
Start: 2021-01-01 | End: 2022-01-01

## 2021-01-01 RX ORDER — AMIODARONE HYDROCHLORIDE 400 MG/1
150 TABLET ORAL ONCE
Refills: 0 | Status: COMPLETED | OUTPATIENT
Start: 2021-01-01 | End: 2021-01-01

## 2021-01-01 RX ORDER — CIPROFLOXACIN LACTATE 400MG/40ML
1 VIAL (ML) INTRAVENOUS
Qty: 0 | Refills: 0 | DISCHARGE
Start: 2021-01-01

## 2021-01-01 RX ORDER — PANTOPRAZOLE SODIUM 20 MG/1
1 TABLET, DELAYED RELEASE ORAL
Qty: 30 | Refills: 0
Start: 2021-01-01 | End: 2022-01-01

## 2021-01-01 RX ORDER — GABAPENTIN 100 MG/1
100 CAPSULE ORAL TWICE DAILY
Qty: 60 | Refills: 2 | Status: DISCONTINUED | COMMUNITY
Start: 2020-10-01 | End: 2021-01-01

## 2021-01-01 RX ORDER — SPIRONOLACTONE 25 MG/1
12.5 TABLET, FILM COATED ORAL EVERY 12 HOURS
Refills: 0 | Status: DISCONTINUED | OUTPATIENT
Start: 2021-01-01 | End: 2021-01-01

## 2021-01-01 RX ORDER — HEPARIN SODIUM 5000 [USP'U]/ML
1000 INJECTION INTRAVENOUS; SUBCUTANEOUS
Qty: 25000 | Refills: 0 | Status: DISCONTINUED | OUTPATIENT
Start: 2021-01-01 | End: 2021-01-01

## 2021-01-01 RX ORDER — IPRATROPIUM/ALBUTEROL SULFATE 18-103MCG
3 AEROSOL WITH ADAPTER (GRAM) INHALATION EVERY 6 HOURS
Refills: 0 | Status: DISCONTINUED | OUTPATIENT
Start: 2021-01-01 | End: 2021-01-01

## 2021-01-01 RX ORDER — METHIMAZOLE 10 MG/1
10 TABLET ORAL DAILY
Qty: 90 | Refills: 3 | Status: ACTIVE | COMMUNITY
Start: 2021-01-01 | End: 1900-01-01

## 2021-01-01 RX ORDER — SODIUM CHLORIDE 9 MG/ML
500 INJECTION INTRAMUSCULAR; INTRAVENOUS; SUBCUTANEOUS
Refills: 0 | Status: DISCONTINUED | OUTPATIENT
Start: 2021-01-01 | End: 2021-01-01

## 2021-01-01 RX ORDER — METOCLOPRAMIDE HCL 10 MG
5 TABLET ORAL
Refills: 0 | Status: DISCONTINUED | OUTPATIENT
Start: 2021-01-01 | End: 2021-01-01

## 2021-01-01 RX ORDER — WARFARIN 3 MG/1
3 TABLET ORAL DAILY
Qty: 90 | Refills: 3 | Status: DISCONTINUED | COMMUNITY
Start: 2020-12-29 | End: 2021-01-01

## 2021-01-01 RX ORDER — MULTIVITAMIN
TABLET ORAL
Qty: 90 | Refills: 3 | Status: ACTIVE | COMMUNITY
Start: 2021-01-01 | End: 1900-01-01

## 2021-01-01 RX ORDER — MIRTAZAPINE 7.5 MG/1
7.5 TABLET, FILM COATED ORAL
Qty: 90 | Refills: 3 | Status: DISCONTINUED | COMMUNITY
Start: 2018-04-28 | End: 2021-01-01

## 2021-01-01 RX ORDER — METOCLOPRAMIDE HCL 10 MG
10 TABLET ORAL EVERY 8 HOURS
Refills: 0 | Status: DISCONTINUED | OUTPATIENT
Start: 2021-01-01 | End: 2021-01-01

## 2021-01-01 RX ORDER — INSULIN LISPRO 100/ML
VIAL (ML) SUBCUTANEOUS
Refills: 0 | Status: DISCONTINUED | OUTPATIENT
Start: 2021-01-01 | End: 2022-01-01

## 2021-01-01 RX ORDER — SODIUM CHLORIDE 9 MG/ML
1 INJECTION INTRAMUSCULAR; INTRAVENOUS; SUBCUTANEOUS DAILY
Refills: 0 | Status: DISCONTINUED | OUTPATIENT
Start: 2021-01-01 | End: 2021-01-01

## 2021-01-01 RX ORDER — HYDROCORTISONE 20 MG
25 TABLET ORAL
Refills: 0 | Status: CANCELLED | OUTPATIENT
Start: 2021-01-01 | End: 2021-01-01

## 2021-01-01 RX ORDER — SERTRALINE 25 MG/1
1 TABLET, FILM COATED ORAL
Qty: 30 | Refills: 0
Start: 2021-01-01 | End: 2022-01-01

## 2021-01-01 RX ORDER — PROPOFOL 10 MG/ML
20 INJECTION, EMULSION INTRAVENOUS
Qty: 500 | Refills: 0 | Status: DISCONTINUED | OUTPATIENT
Start: 2021-01-01 | End: 2021-01-01

## 2021-01-01 RX ORDER — AMIODARONE HYDROCHLORIDE 200 MG/1
200 TABLET ORAL DAILY
Qty: 30 | Refills: 3 | Status: ACTIVE | COMMUNITY
Start: 2021-01-01

## 2021-01-01 RX ORDER — VANCOMYCIN HCL 1 G
1000 VIAL (EA) INTRAVENOUS ONCE
Refills: 0 | Status: COMPLETED | OUTPATIENT
Start: 2021-01-01 | End: 2021-01-01

## 2021-01-01 RX ORDER — SODIUM CHLORIDE 9 MG/ML
3 INJECTION INTRAMUSCULAR; INTRAVENOUS; SUBCUTANEOUS EVERY 8 HOURS
Refills: 0 | Status: DISCONTINUED | OUTPATIENT
Start: 2021-01-01 | End: 2022-01-01

## 2021-01-01 RX ORDER — PHYTONADIONE (VIT K1) 5 MG
10 TABLET ORAL ONCE
Refills: 0 | Status: COMPLETED | OUTPATIENT
Start: 2021-01-01 | End: 2021-01-01

## 2021-01-01 RX ORDER — LOSARTAN POTASSIUM 100 MG/1
25 TABLET, FILM COATED ORAL DAILY
Refills: 0 | Status: DISCONTINUED | OUTPATIENT
Start: 2021-01-01 | End: 2021-01-01

## 2021-01-01 RX ORDER — DIGOXIN 250 MCG
0.25 TABLET ORAL ONCE
Refills: 0 | Status: COMPLETED | OUTPATIENT
Start: 2021-01-01 | End: 2021-01-01

## 2021-01-01 RX ORDER — VANCOMYCIN HCL 1 G
125 VIAL (EA) INTRAVENOUS DAILY
Refills: 0 | Status: DISCONTINUED | OUTPATIENT
Start: 2021-01-01 | End: 2021-01-01

## 2021-01-01 RX ORDER — CEFAZOLIN SODIUM 1 G
1000 VIAL (EA) INJECTION EVERY 8 HOURS
Refills: 0 | Status: DISCONTINUED | OUTPATIENT
Start: 2021-01-01 | End: 2021-01-01

## 2021-01-01 RX ORDER — CLOPIDOGREL BISULFATE 75 MG/1
300 TABLET, FILM COATED ORAL ONCE
Refills: 0 | Status: COMPLETED | OUTPATIENT
Start: 2021-01-01 | End: 2021-01-01

## 2021-01-01 RX ORDER — DEXAMETHASONE 0.5 MG/5ML
6 ELIXIR ORAL DAILY
Refills: 0 | Status: COMPLETED | OUTPATIENT
Start: 2021-01-01 | End: 2021-01-01

## 2021-01-01 RX ORDER — SPIRONOLACTONE 25 MG/1
1 TABLET, FILM COATED ORAL
Qty: 0 | Refills: 0 | DISCHARGE
Start: 2021-01-01

## 2021-01-01 RX ORDER — NICARDIPINE HYDROCHLORIDE 30 MG/1
5 CAPSULE, EXTENDED RELEASE ORAL
Qty: 40 | Refills: 0 | Status: DISCONTINUED | OUTPATIENT
Start: 2021-01-01 | End: 2021-01-01

## 2021-01-01 RX ORDER — THIAMINE MONONITRATE (VIT B1) 100 MG
100 TABLET ORAL DAILY
Refills: 0 | Status: DISCONTINUED | OUTPATIENT
Start: 2021-01-01 | End: 2021-01-01

## 2021-01-01 RX ORDER — DEXMEDETOMIDINE HYDROCHLORIDE IN 0.9% SODIUM CHLORIDE 4 UG/ML
0.4 INJECTION INTRAVENOUS
Qty: 200 | Refills: 0 | Status: DISCONTINUED | OUTPATIENT
Start: 2021-01-01 | End: 2021-01-01

## 2021-01-01 RX ORDER — WARFARIN 3 MG/1
3 TABLET ORAL DAILY
Qty: 90 | Refills: 3 | Status: DISCONTINUED | COMMUNITY
Start: 2021-01-07 | End: 2021-01-01

## 2021-01-01 RX ORDER — NICARDIPINE HYDROCHLORIDE 30 MG/1
3 CAPSULE, EXTENDED RELEASE ORAL
Qty: 40 | Refills: 0 | Status: DISCONTINUED | OUTPATIENT
Start: 2021-01-01 | End: 2021-01-01

## 2021-01-01 RX ORDER — PROPOFOL 10 MG/ML
13.53 INJECTION, EMULSION INTRAVENOUS
Qty: 1000 | Refills: 0 | Status: DISCONTINUED | OUTPATIENT
Start: 2021-01-01 | End: 2021-01-01

## 2021-01-01 RX ORDER — PROPOFOL 10 MG/ML
13.53 INJECTION, EMULSION INTRAVENOUS
Qty: 500 | Refills: 0 | Status: DISCONTINUED | OUTPATIENT
Start: 2021-01-01 | End: 2021-01-01

## 2021-01-01 RX ORDER — FUROSEMIDE 40 MG
20 TABLET ORAL DAILY
Refills: 0 | Status: DISCONTINUED | OUTPATIENT
Start: 2021-01-01 | End: 2021-01-01

## 2021-01-01 RX ORDER — DEXAMETHASONE 0.5 MG/5ML
6 ELIXIR ORAL DAILY
Refills: 0 | Status: DISCONTINUED | OUTPATIENT
Start: 2021-01-01 | End: 2021-01-01

## 2021-01-01 RX ORDER — MIDAZOLAM HYDROCHLORIDE 1 MG/ML
1 INJECTION, SOLUTION INTRAMUSCULAR; INTRAVENOUS ONCE
Refills: 0 | Status: DISCONTINUED | OUTPATIENT
Start: 2021-01-01 | End: 2021-01-01

## 2021-01-01 RX ORDER — BACLOFEN 100 %
5 POWDER (GRAM) MISCELLANEOUS EVERY 8 HOURS
Refills: 0 | Status: DISCONTINUED | OUTPATIENT
Start: 2021-01-01 | End: 2022-01-01

## 2021-01-01 RX ORDER — INFLUENZA VIRUS VACCINE 15; 15; 15; 15 UG/.5ML; UG/.5ML; UG/.5ML; UG/.5ML
0.7 SUSPENSION INTRAMUSCULAR ONCE
Refills: 0 | Status: DISCONTINUED | OUTPATIENT
Start: 2021-01-01 | End: 2021-01-01

## 2021-01-01 RX ORDER — MEROPENEM 1 G/30ML
1000 INJECTION INTRAVENOUS ONCE
Refills: 0 | Status: COMPLETED | OUTPATIENT
Start: 2021-01-01 | End: 2021-01-01

## 2021-01-01 RX ORDER — HYDROCORTISONE 20 MG
25 TABLET ORAL EVERY 8 HOURS
Refills: 0 | Status: COMPLETED | OUTPATIENT
Start: 2021-01-01 | End: 2021-01-01

## 2021-01-01 RX ORDER — DEXMEDETOMIDINE HYDROCHLORIDE IN 0.9% SODIUM CHLORIDE 4 UG/ML
0.3 INJECTION INTRAVENOUS
Qty: 200 | Refills: 0 | Status: DISCONTINUED | OUTPATIENT
Start: 2021-01-01 | End: 2021-01-01

## 2021-01-01 RX ORDER — PHENYLEPHRINE HYDROCHLORIDE 10 MG/ML
4.33 INJECTION INTRAVENOUS
Qty: 160 | Refills: 0 | Status: DISCONTINUED | OUTPATIENT
Start: 2021-01-01 | End: 2021-01-01

## 2021-01-01 RX ORDER — SERTRALINE HYDROCHLORIDE 100 MG/1
100 TABLET, FILM COATED ORAL DAILY
Qty: 90 | Refills: 3 | Status: ACTIVE | COMMUNITY
Start: 2021-01-01 | End: 1900-01-01

## 2021-01-01 RX ORDER — VASOPRESSIN 20 [USP'U]/ML
0.03 INJECTION INTRAVENOUS
Qty: 50 | Refills: 0 | Status: DISCONTINUED | OUTPATIENT
Start: 2021-01-01 | End: 2021-01-01

## 2021-01-01 RX ORDER — POTASSIUM CHLORIDE 20 MEQ
20 PACKET (EA) ORAL ONCE
Refills: 0 | Status: DISCONTINUED | OUTPATIENT
Start: 2021-01-01 | End: 2021-01-01

## 2021-01-01 RX ORDER — WARFARIN SODIUM 2.5 MG/1
7 TABLET ORAL ONCE
Refills: 0 | Status: COMPLETED | OUTPATIENT
Start: 2021-01-01 | End: 2021-01-01

## 2021-01-01 RX ORDER — ASPIRIN/CALCIUM CARB/MAGNESIUM 324 MG
1 TABLET ORAL
Qty: 30 | Refills: 0
Start: 2021-01-01 | End: 2022-01-01

## 2021-01-01 RX ORDER — METRONIDAZOLE 500 MG
500 TABLET ORAL EVERY 6 HOURS
Refills: 0 | Status: DISCONTINUED | OUTPATIENT
Start: 2021-01-01 | End: 2021-01-01

## 2021-01-01 RX ORDER — MIDAZOLAM HYDROCHLORIDE 1 MG/ML
2 INJECTION, SOLUTION INTRAMUSCULAR; INTRAVENOUS ONCE
Refills: 0 | Status: DISCONTINUED | OUTPATIENT
Start: 2021-01-01 | End: 2021-01-01

## 2021-01-01 RX ORDER — INSULIN LISPRO 100/ML
VIAL (ML) SUBCUTANEOUS AT BEDTIME
Refills: 0 | Status: DISCONTINUED | OUTPATIENT
Start: 2021-01-01 | End: 2022-01-01

## 2021-01-01 RX ORDER — HUMAN INSULIN 100 [IU]/ML
8 INJECTION, SUSPENSION SUBCUTANEOUS EVERY 6 HOURS
Refills: 0 | Status: DISCONTINUED | OUTPATIENT
Start: 2021-01-01 | End: 2021-01-01

## 2021-01-01 RX ORDER — HYDROCORTISONE 20 MG
50 TABLET ORAL EVERY 8 HOURS
Refills: 0 | Status: COMPLETED | OUTPATIENT
Start: 2021-01-01 | End: 2021-01-01

## 2021-01-01 RX ORDER — METOPROLOL SUCCINATE 50 MG/1
50 TABLET, EXTENDED RELEASE ORAL
Qty: 90 | Refills: 3 | Status: ACTIVE | COMMUNITY
Start: 2021-01-01 | End: 1900-01-01

## 2021-01-01 RX ORDER — LIDOCAINE HCL 20 MG/ML
0.5 VIAL (ML) INJECTION
Qty: 2 | Refills: 0 | Status: DISCONTINUED | OUTPATIENT
Start: 2021-01-01 | End: 2021-01-01

## 2021-01-01 RX ORDER — WARFARIN SODIUM 2.5 MG/1
7.5 TABLET ORAL ONCE
Refills: 0 | Status: COMPLETED | OUTPATIENT
Start: 2021-01-01 | End: 2021-01-01

## 2021-01-01 RX ORDER — SODIUM CHLORIDE 9 MG/ML
10 INJECTION INTRAMUSCULAR; INTRAVENOUS; SUBCUTANEOUS
Refills: 0 | Status: DISCONTINUED | OUTPATIENT
Start: 2021-01-01 | End: 2021-01-01

## 2021-01-01 RX ORDER — ONDANSETRON 8 MG/1
4 TABLET, FILM COATED ORAL EVERY 8 HOURS
Refills: 0 | Status: DISCONTINUED | OUTPATIENT
Start: 2021-01-01 | End: 2021-01-01

## 2021-01-01 RX ORDER — SPIRONOLACTONE 25 MG/1
25 TABLET ORAL DAILY
Qty: 90 | Refills: 3 | Status: DISCONTINUED | COMMUNITY
Start: 2020-07-22 | End: 2021-01-01

## 2021-01-01 RX ORDER — HEPARIN SODIUM 5000 [USP'U]/ML
5000 INJECTION INTRAVENOUS; SUBCUTANEOUS EVERY 8 HOURS
Refills: 0 | Status: COMPLETED | OUTPATIENT
Start: 2021-01-01 | End: 2021-01-01

## 2021-01-01 RX ORDER — METOCLOPRAMIDE HCL 10 MG
10 TABLET ORAL ONCE
Refills: 0 | Status: COMPLETED | OUTPATIENT
Start: 2021-01-01 | End: 2021-01-01

## 2021-01-01 RX ORDER — PROPOFOL 10 MG/ML
25 INJECTION, EMULSION INTRAVENOUS
Qty: 500 | Refills: 0 | Status: DISCONTINUED | OUTPATIENT
Start: 2021-01-01 | End: 2021-01-01

## 2021-01-01 RX ORDER — METOPROLOL TARTRATE 50 MG
1 TABLET ORAL
Qty: 30 | Refills: 0
Start: 2021-01-01 | End: 2022-01-01

## 2021-01-01 RX ORDER — ONDANSETRON 8 MG/1
8 TABLET, FILM COATED ORAL EVERY 8 HOURS
Refills: 0 | Status: DISCONTINUED | OUTPATIENT
Start: 2021-01-01 | End: 2021-01-01

## 2021-01-01 RX ORDER — SODIUM CHLORIDE 9 MG/ML
1 INJECTION INTRAMUSCULAR; INTRAVENOUS; SUBCUTANEOUS EVERY 12 HOURS
Refills: 0 | Status: DISCONTINUED | OUTPATIENT
Start: 2021-01-01 | End: 2022-01-01

## 2021-01-01 RX ORDER — REMDESIVIR 5 MG/ML
INJECTION INTRAVENOUS
Refills: 0 | Status: COMPLETED | OUTPATIENT
Start: 2021-01-01 | End: 2021-01-01

## 2021-01-01 RX ORDER — SPIRONOLACTONE 25 MG/1
1 TABLET, FILM COATED ORAL
Qty: 30 | Refills: 0
Start: 2021-01-01 | End: 2022-01-01

## 2021-01-01 RX ORDER — CEFEPIME 1 G/1
2000 INJECTION, POWDER, FOR SOLUTION INTRAMUSCULAR; INTRAVENOUS EVERY 8 HOURS
Refills: 0 | Status: DISCONTINUED | OUTPATIENT
Start: 2021-01-01 | End: 2022-01-01

## 2021-01-01 RX ORDER — METOCLOPRAMIDE HCL 10 MG
10 TABLET ORAL EVERY 8 HOURS
Refills: 0 | Status: DISCONTINUED | OUTPATIENT
Start: 2021-01-01 | End: 2022-01-01

## 2021-01-01 RX ORDER — METOPROLOL TARTRATE 50 MG
50 TABLET ORAL DAILY
Refills: 0 | Status: DISCONTINUED | OUTPATIENT
Start: 2021-01-01 | End: 2022-01-01

## 2021-01-01 RX ORDER — MILRINONE LACTATE 1 MG/ML
0.4 INJECTION, SOLUTION INTRAVENOUS
Qty: 20 | Refills: 0 | Status: DISCONTINUED | OUTPATIENT
Start: 2021-01-01 | End: 2021-01-01

## 2021-01-01 RX ORDER — FINASTERIDE 5 MG/1
5 TABLET, FILM COATED ORAL
Qty: 90 | Refills: 3 | Status: ACTIVE | COMMUNITY
Start: 2017-12-11 | End: 1900-01-01

## 2021-01-01 RX ORDER — VANCOMYCIN HCL 1 G
500 VIAL (EA) INTRAVENOUS EVERY 6 HOURS
Refills: 0 | Status: DISCONTINUED | OUTPATIENT
Start: 2021-01-01 | End: 2021-01-01

## 2021-01-01 RX ORDER — TRAMADOL HYDROCHLORIDE 50 MG/1
25 TABLET ORAL ONCE
Refills: 0 | Status: DISCONTINUED | OUTPATIENT
Start: 2021-01-01 | End: 2021-01-01

## 2021-01-01 RX ORDER — INSULIN LISPRO 100/ML
VIAL (ML) SUBCUTANEOUS EVERY 4 HOURS
Refills: 0 | Status: DISCONTINUED | OUTPATIENT
Start: 2021-01-01 | End: 2021-01-01

## 2021-01-01 RX ORDER — HUMAN INSULIN 100 [IU]/ML
5 INJECTION, SUSPENSION SUBCUTANEOUS ONCE
Refills: 0 | Status: COMPLETED | OUTPATIENT
Start: 2021-01-01 | End: 2021-01-01

## 2021-01-01 RX ORDER — MAGNESIUM OXIDE 400 MG ORAL TABLET 241.3 MG
1 TABLET ORAL
Qty: 60 | Refills: 0
Start: 2021-01-01 | End: 2022-01-01

## 2021-01-01 RX ORDER — MIRTAZAPINE 45 MG/1
1 TABLET, ORALLY DISINTEGRATING ORAL
Qty: 0 | Refills: 0 | DISCHARGE
Start: 2021-01-01

## 2021-01-01 RX ORDER — METOPROLOL TARTRATE 50 MG
2.5 TABLET ORAL EVERY 6 HOURS
Refills: 0 | Status: DISCONTINUED | OUTPATIENT
Start: 2021-01-01 | End: 2021-01-01

## 2021-01-01 RX ORDER — ENOXAPARIN SODIUM 100 MG/ML
40 INJECTION SUBCUTANEOUS DAILY
Refills: 0 | Status: DISCONTINUED | OUTPATIENT
Start: 2021-01-01 | End: 2021-01-01

## 2021-01-01 RX ORDER — SODIUM CHLORIDE 9 MG/ML
500 INJECTION, SOLUTION INTRAVENOUS
Refills: 0 | Status: DISCONTINUED | OUTPATIENT
Start: 2021-01-01 | End: 2021-01-01

## 2021-01-01 RX ORDER — METOCLOPRAMIDE HCL 10 MG
5 TABLET ORAL THREE TIMES A DAY
Refills: 0 | Status: DISCONTINUED | OUTPATIENT
Start: 2021-01-01 | End: 2021-01-01

## 2021-01-01 RX ORDER — HUMAN INSULIN 100 [IU]/ML
4 INJECTION, SUSPENSION SUBCUTANEOUS
Refills: 0 | Status: DISCONTINUED | OUTPATIENT
Start: 2021-01-01 | End: 2021-01-01

## 2021-01-01 RX ORDER — AMIODARONE HYDROCHLORIDE 200 MG/1
200 TABLET ORAL DAILY
Qty: 90 | Refills: 1 | Status: DISCONTINUED | COMMUNITY
Start: 2020-04-27 | End: 2021-01-01

## 2021-01-01 RX ORDER — CIPROFLOXACIN LACTATE 400MG/40ML
1 VIAL (ML) INTRAVENOUS
Qty: 60 | Refills: 0
Start: 2021-01-01 | End: 2022-01-01

## 2021-01-01 RX ORDER — DEXTROSE 50 % IN WATER 50 %
25 SYRINGE (ML) INTRAVENOUS
Refills: 0 | Status: DISCONTINUED | OUTPATIENT
Start: 2021-01-01 | End: 2022-01-01

## 2021-01-01 RX ORDER — SPIRONOLACTONE 25 MG/1
25 TABLET, FILM COATED ORAL DAILY
Refills: 0 | Status: DISCONTINUED | OUTPATIENT
Start: 2021-01-01 | End: 2022-01-01

## 2021-01-01 RX ORDER — HUMAN INSULIN 100 [IU]/ML
6 INJECTION, SUSPENSION SUBCUTANEOUS
Refills: 0 | Status: DISCONTINUED | OUTPATIENT
Start: 2021-01-01 | End: 2021-01-01

## 2021-01-01 RX ORDER — POTASSIUM CHLORIDE 20 MEQ
10 PACKET (EA) ORAL
Refills: 0 | Status: DISCONTINUED | OUTPATIENT
Start: 2021-01-01 | End: 2021-01-01

## 2021-01-01 RX ORDER — METOPROLOL SUCCINATE 25 MG/1
25 TABLET, EXTENDED RELEASE ORAL DAILY
Qty: 90 | Refills: 3 | Status: DISCONTINUED | COMMUNITY
Start: 2020-04-27 | End: 2021-01-01

## 2021-01-01 RX ORDER — SPIRONOLACTONE 25 MG/1
25 TABLET ORAL DAILY
Qty: 90 | Refills: 3 | Status: ACTIVE | COMMUNITY
Start: 2021-01-01 | End: 1900-01-01

## 2021-01-01 RX ORDER — HYDRALAZINE HCL 50 MG
1 TABLET ORAL
Qty: 0 | Refills: 0 | DISCHARGE
Start: 2021-01-01

## 2021-01-01 RX ORDER — FUROSEMIDE 40 MG
40 TABLET ORAL EVERY 12 HOURS
Refills: 0 | Status: DISCONTINUED | OUTPATIENT
Start: 2021-01-01 | End: 2021-01-01

## 2021-01-01 RX ORDER — WARFARIN 4 MG/1
4 TABLET ORAL DAILY
Qty: 90 | Refills: 3 | Status: DISCONTINUED | COMMUNITY
Start: 2020-10-20 | End: 2021-01-01

## 2021-01-01 RX ORDER — MIRTAZAPINE 45 MG/1
1 TABLET, ORALLY DISINTEGRATING ORAL
Qty: 30 | Refills: 0
Start: 2021-01-01 | End: 2022-01-01

## 2021-01-01 RX ORDER — VECURONIUM BROMIDE 20 MG/1
2.5 INJECTION, POWDER, FOR SOLUTION INTRAVENOUS ONCE
Refills: 0 | Status: COMPLETED | OUTPATIENT
Start: 2021-01-01 | End: 2021-01-01

## 2021-01-01 RX ORDER — NOREPINEPHRINE BITARTRATE/D5W 8 MG/250ML
0.05 PLASTIC BAG, INJECTION (ML) INTRAVENOUS
Qty: 16 | Refills: 0 | Status: DISCONTINUED | OUTPATIENT
Start: 2021-01-01 | End: 2021-01-01

## 2021-01-01 RX ORDER — ONDANSETRON 8 MG/1
4 TABLET, FILM COATED ORAL EVERY 6 HOURS
Refills: 0 | Status: DISCONTINUED | OUTPATIENT
Start: 2021-01-01 | End: 2021-01-01

## 2021-01-01 RX ORDER — PANTOPRAZOLE SODIUM 20 MG/1
40 TABLET, DELAYED RELEASE ORAL ONCE
Refills: 0 | Status: COMPLETED | OUTPATIENT
Start: 2021-01-01 | End: 2021-01-01

## 2021-01-01 RX ORDER — SERTRALINE 25 MG/1
50 TABLET, FILM COATED ORAL DAILY
Refills: 0 | Status: DISCONTINUED | OUTPATIENT
Start: 2021-01-01 | End: 2021-01-01

## 2021-01-01 RX ORDER — REMDESIVIR 5 MG/ML
INJECTION INTRAVENOUS
Refills: 0 | Status: DISCONTINUED | OUTPATIENT
Start: 2021-01-01 | End: 2021-01-01

## 2021-01-01 RX ORDER — CLONAZEPAM 1 MG
1 TABLET ORAL EVERY 8 HOURS
Refills: 0 | Status: DISCONTINUED | OUTPATIENT
Start: 2021-01-01 | End: 2021-01-01

## 2021-01-01 RX ORDER — LACTOBACILLUS ACIDOPHILUS 100MM CELL
1 CAPSULE ORAL
Qty: 30 | Refills: 0
Start: 2021-01-01 | End: 2022-01-01

## 2021-01-01 RX ORDER — CHLORHEXIDINE GLUCONATE 213 G/1000ML
1 SOLUTION TOPICAL
Qty: 0 | Refills: 0 | DISCHARGE
Start: 2021-01-01

## 2021-01-01 RX ORDER — IPRATROPIUM BROMIDE 0.2 MG/ML
1 SOLUTION, NON-ORAL INHALATION EVERY 6 HOURS
Refills: 0 | Status: DISCONTINUED | OUTPATIENT
Start: 2021-01-01 | End: 2021-01-01

## 2021-01-01 RX ORDER — IPRATROPIUM/ALBUTEROL SULFATE 18-103MCG
3 AEROSOL WITH ADAPTER (GRAM) INHALATION ONCE
Refills: 0 | Status: COMPLETED | OUTPATIENT
Start: 2021-01-01 | End: 2021-01-01

## 2021-01-01 RX ORDER — FUROSEMIDE 40 MG
20 TABLET ORAL EVERY 12 HOURS
Refills: 0 | Status: DISCONTINUED | OUTPATIENT
Start: 2021-01-01 | End: 2021-01-01

## 2021-01-01 RX ORDER — ROBINUL 0.2 MG/ML
1 INJECTION INTRAMUSCULAR; INTRAVENOUS ONCE
Refills: 0 | Status: COMPLETED | OUTPATIENT
Start: 2021-01-01 | End: 2021-01-01

## 2021-01-01 RX ORDER — ENOXAPARIN SODIUM 100 MG/ML
30 INJECTION SUBCUTANEOUS DAILY
Refills: 0 | Status: DISCONTINUED | OUTPATIENT
Start: 2021-01-01 | End: 2022-01-01

## 2021-01-01 RX ORDER — PROPOFOL 10 MG/ML
21.23 INJECTION, EMULSION INTRAVENOUS
Qty: 500 | Refills: 0 | Status: DISCONTINUED | OUTPATIENT
Start: 2021-01-01 | End: 2021-01-01

## 2021-01-01 RX ORDER — HYDRALAZINE HYDROCHLORIDE 25 MG/1
25 TABLET ORAL 3 TIMES DAILY
Qty: 270 | Refills: 3 | Status: ACTIVE | COMMUNITY
Start: 2021-01-01 | End: 1900-01-01

## 2021-01-01 RX ORDER — PANTOPRAZOLE 40 MG/1
40 TABLET, DELAYED RELEASE ORAL
Qty: 90 | Refills: 0 | Status: ACTIVE | COMMUNITY
Start: 2020-07-22 | End: 1900-01-01

## 2021-01-01 RX ORDER — FUROSEMIDE 40 MG
40 TABLET ORAL
Refills: 0 | Status: DISCONTINUED | OUTPATIENT
Start: 2021-01-01 | End: 2021-01-01

## 2021-01-01 RX ORDER — DIPHENHYDRAMINE HCL 50 MG
25 CAPSULE ORAL ONCE
Refills: 0 | Status: COMPLETED | OUTPATIENT
Start: 2021-01-01 | End: 2021-01-01

## 2021-01-01 RX ORDER — MIRTAZAPINE 45 MG/1
7.5 TABLET, ORALLY DISINTEGRATING ORAL AT BEDTIME
Refills: 0 | Status: DISCONTINUED | OUTPATIENT
Start: 2021-01-01 | End: 2022-01-01

## 2021-01-01 RX ORDER — SERTRALINE 25 MG/1
1 TABLET, FILM COATED ORAL
Qty: 0 | Refills: 0 | DISCHARGE
Start: 2021-01-01

## 2021-01-01 RX ORDER — CHLORHEXIDINE GLUCONATE 213 G/1000ML
5 SOLUTION TOPICAL
Refills: 0 | Status: DISCONTINUED | OUTPATIENT
Start: 2021-01-01 | End: 2021-01-01

## 2021-01-01 RX ADMIN — CHLORHEXIDINE GLUCONATE 15 MILLILITER(S): 213 SOLUTION TOPICAL at 05:15

## 2021-01-01 RX ADMIN — Medication 125 MILLIGRAM(S): at 00:06

## 2021-01-01 RX ADMIN — CHLORHEXIDINE GLUCONATE 1 APPLICATION(S): 213 SOLUTION TOPICAL at 05:05

## 2021-01-01 RX ADMIN — Medication 125 MILLIGRAM(S): at 17:03

## 2021-01-01 RX ADMIN — Medication 0.5 MILLIGRAM(S): at 21:45

## 2021-01-01 RX ADMIN — HEPARIN SODIUM 5000 UNIT(S): 5000 INJECTION INTRAVENOUS; SUBCUTANEOUS at 21:38

## 2021-01-01 RX ADMIN — PANTOPRAZOLE SODIUM 40 MILLIGRAM(S): 20 TABLET, DELAYED RELEASE ORAL at 06:23

## 2021-01-01 RX ADMIN — Medication 300 MILLIGRAM(S): at 05:02

## 2021-01-01 RX ADMIN — Medication 10 MILLIGRAM(S): at 05:36

## 2021-01-01 RX ADMIN — Medication 100 MILLIGRAM(S): at 11:58

## 2021-01-01 RX ADMIN — Medication 500 MICROGRAM(S): at 06:54

## 2021-01-01 RX ADMIN — CEFEPIME 100 MILLIGRAM(S): 1 INJECTION, POWDER, FOR SOLUTION INTRAMUSCULAR; INTRAVENOUS at 21:09

## 2021-01-01 RX ADMIN — Medication 125 MILLIGRAM(S): at 18:05

## 2021-01-01 RX ADMIN — Medication 125 MILLIGRAM(S): at 00:51

## 2021-01-01 RX ADMIN — ONDANSETRON 8 MILLIGRAM(S): 8 TABLET, FILM COATED ORAL at 21:45

## 2021-01-01 RX ADMIN — Medication 25 MILLIGRAM(S): at 05:57

## 2021-01-01 RX ADMIN — Medication 50 MILLIGRAM(S): at 21:44

## 2021-01-01 RX ADMIN — Medication 10 MILLIGRAM(S): at 21:50

## 2021-01-01 RX ADMIN — Medication 1 PACKET(S): at 15:36

## 2021-01-01 RX ADMIN — Medication 50 MILLIGRAM(S): at 05:38

## 2021-01-01 RX ADMIN — HYDROMORPHONE HYDROCHLORIDE 0.5 MILLIGRAM(S): 2 INJECTION INTRAMUSCULAR; INTRAVENOUS; SUBCUTANEOUS at 15:25

## 2021-01-01 RX ADMIN — Medication 0.5 MILLIGRAM(S): at 05:02

## 2021-01-01 RX ADMIN — Medication 100 MILLIGRAM(S): at 11:46

## 2021-01-01 RX ADMIN — ONDANSETRON 8 MILLIGRAM(S): 8 TABLET, FILM COATED ORAL at 13:10

## 2021-01-01 RX ADMIN — Medication 125 MILLIGRAM(S): at 00:56

## 2021-01-01 RX ADMIN — Medication 1 TABLET(S): at 13:04

## 2021-01-01 RX ADMIN — PANTOPRAZOLE SODIUM 40 MILLIGRAM(S): 20 TABLET, DELAYED RELEASE ORAL at 17:14

## 2021-01-01 RX ADMIN — CHLORHEXIDINE GLUCONATE 15 MILLILITER(S): 213 SOLUTION TOPICAL at 17:43

## 2021-01-01 RX ADMIN — CHLORHEXIDINE GLUCONATE 1 APPLICATION(S): 213 SOLUTION TOPICAL at 06:03

## 2021-01-01 RX ADMIN — PANTOPRAZOLE SODIUM 40 MILLIGRAM(S): 20 TABLET, DELAYED RELEASE ORAL at 05:52

## 2021-01-01 RX ADMIN — SIMETHICONE 80 MILLIGRAM(S): 80 TABLET, CHEWABLE ORAL at 05:09

## 2021-01-01 RX ADMIN — Medication 650 MILLIGRAM(S): at 09:45

## 2021-01-01 RX ADMIN — Medication 10 MILLIGRAM(S): at 21:03

## 2021-01-01 RX ADMIN — CEFEPIME 100 MILLIGRAM(S): 1 INJECTION, POWDER, FOR SOLUTION INTRAMUSCULAR; INTRAVENOUS at 22:12

## 2021-01-01 RX ADMIN — Medication 125 MILLIGRAM(S): at 00:54

## 2021-01-01 RX ADMIN — Medication 500 MILLIGRAM(S): at 12:17

## 2021-01-01 RX ADMIN — SODIUM CHLORIDE 3 MILLILITER(S): 9 INJECTION INTRAMUSCULAR; INTRAVENOUS; SUBCUTANEOUS at 05:41

## 2021-01-01 RX ADMIN — Medication 10 MILLIGRAM(S): at 05:08

## 2021-01-01 RX ADMIN — PANTOPRAZOLE SODIUM 40 MILLIGRAM(S): 20 TABLET, DELAYED RELEASE ORAL at 17:12

## 2021-01-01 RX ADMIN — CHLORHEXIDINE GLUCONATE 1 APPLICATION(S): 213 SOLUTION TOPICAL at 05:40

## 2021-01-01 RX ADMIN — MEROPENEM 100 MILLIGRAM(S): 1 INJECTION INTRAVENOUS at 13:07

## 2021-01-01 RX ADMIN — Medication 300 MILLIGRAM(S): at 05:12

## 2021-01-01 RX ADMIN — Medication 125 MILLIGRAM(S): at 05:48

## 2021-01-01 RX ADMIN — MEROPENEM 100 MILLIGRAM(S): 1 INJECTION INTRAVENOUS at 23:07

## 2021-01-01 RX ADMIN — PANTOPRAZOLE SODIUM 40 MILLIGRAM(S): 20 TABLET, DELAYED RELEASE ORAL at 18:05

## 2021-01-01 RX ADMIN — HEPARIN SODIUM 5000 UNIT(S): 5000 INJECTION INTRAVENOUS; SUBCUTANEOUS at 14:08

## 2021-01-01 RX ADMIN — HEPARIN SODIUM 8 UNIT(S)/HR: 5000 INJECTION INTRAVENOUS; SUBCUTANEOUS at 10:47

## 2021-01-01 RX ADMIN — Medication 3 MILLILITER(S): at 05:20

## 2021-01-01 RX ADMIN — Medication 10 MILLIGRAM(S): at 05:22

## 2021-01-01 RX ADMIN — CHLORHEXIDINE GLUCONATE 15 MILLILITER(S): 213 SOLUTION TOPICAL at 05:49

## 2021-01-01 RX ADMIN — Medication 1 TABLET(S): at 12:39

## 2021-01-01 RX ADMIN — SODIUM CHLORIDE 1 GRAM(S): 9 INJECTION INTRAMUSCULAR; INTRAVENOUS; SUBCUTANEOUS at 15:05

## 2021-01-01 RX ADMIN — SODIUM CHLORIDE 10 MILLILITER(S): 9 INJECTION INTRAMUSCULAR; INTRAVENOUS; SUBCUTANEOUS at 20:16

## 2021-01-01 RX ADMIN — Medication 125 MILLIGRAM(S): at 00:00

## 2021-01-01 RX ADMIN — Medication 50 GRAM(S): at 13:48

## 2021-01-01 RX ADMIN — Medication 1: at 13:53

## 2021-01-01 RX ADMIN — HEPARIN SODIUM 12.5 UNIT(S)/HR: 5000 INJECTION INTRAVENOUS; SUBCUTANEOUS at 00:33

## 2021-01-01 RX ADMIN — Medication 650 MILLIGRAM(S): at 12:35

## 2021-01-01 RX ADMIN — HUMAN INSULIN 4 UNIT(S): 100 INJECTION, SUSPENSION SUBCUTANEOUS at 17:56

## 2021-01-01 RX ADMIN — Medication 25 MILLIGRAM(S): at 06:13

## 2021-01-01 RX ADMIN — Medication 83.33 MILLIMOLE(S): at 04:51

## 2021-01-01 RX ADMIN — ONDANSETRON 8 MILLIGRAM(S): 8 TABLET, FILM COATED ORAL at 20:45

## 2021-01-01 RX ADMIN — Medication 10 MILLIGRAM(S): at 14:07

## 2021-01-01 RX ADMIN — Medication 1 TABLET(S): at 11:39

## 2021-01-01 RX ADMIN — HUMAN INSULIN 6 UNIT(S): 100 INJECTION, SUSPENSION SUBCUTANEOUS at 06:13

## 2021-01-01 RX ADMIN — CEFEPIME 100 MILLIGRAM(S): 1 INJECTION, POWDER, FOR SOLUTION INTRAMUSCULAR; INTRAVENOUS at 13:04

## 2021-01-01 RX ADMIN — Medication 10 MILLIGRAM(S): at 05:10

## 2021-01-01 RX ADMIN — Medication 62.5 MILLIMOLE(S): at 01:45

## 2021-01-01 RX ADMIN — ENOXAPARIN SODIUM 40 MILLIGRAM(S): 100 INJECTION SUBCUTANEOUS at 06:09

## 2021-01-01 RX ADMIN — Medication 100 GRAM(S): at 14:29

## 2021-01-01 RX ADMIN — Medication 1: at 12:07

## 2021-01-01 RX ADMIN — MAGNESIUM OXIDE 400 MG ORAL TABLET 400 MILLIGRAM(S): 241.3 TABLET ORAL at 17:16

## 2021-01-01 RX ADMIN — DEXTROSE MONOHYDRATE, SODIUM CHLORIDE, AND POTASSIUM CHLORIDE 50 MILLILITER(S): 50; .745; 4.5 INJECTION, SOLUTION INTRAVENOUS at 18:22

## 2021-01-01 RX ADMIN — HUMAN INSULIN 10 UNIT(S): 100 INJECTION, SUSPENSION SUBCUTANEOUS at 18:09

## 2021-01-01 RX ADMIN — CHLORHEXIDINE GLUCONATE 1 APPLICATION(S): 213 SOLUTION TOPICAL at 06:09

## 2021-01-01 RX ADMIN — Medication 1 TABLET(S): at 05:29

## 2021-01-01 RX ADMIN — MEROPENEM 100 MILLIGRAM(S): 1 INJECTION INTRAVENOUS at 21:44

## 2021-01-01 RX ADMIN — Medication 1000 MILLIGRAM(S): at 15:07

## 2021-01-01 RX ADMIN — Medication 125 MILLIGRAM(S): at 10:22

## 2021-01-01 RX ADMIN — Medication 20 MILLIGRAM(S): at 12:20

## 2021-01-01 RX ADMIN — FENTANYL CITRATE 12 MICROGRAM(S): 50 INJECTION INTRAVENOUS at 15:00

## 2021-01-01 RX ADMIN — CHLORHEXIDINE GLUCONATE 15 MILLILITER(S): 213 SOLUTION TOPICAL at 05:13

## 2021-01-01 RX ADMIN — Medication 83.33 MILLIMOLE(S): at 01:56

## 2021-01-01 RX ADMIN — Medication 25 MILLIGRAM(S): at 21:36

## 2021-01-01 RX ADMIN — Medication 25 MILLIGRAM(S): at 05:28

## 2021-01-01 RX ADMIN — DEXMEDETOMIDINE HYDROCHLORIDE IN 0.9% SODIUM CHLORIDE 9.81 MICROGRAM(S)/KG/HR: 4 INJECTION INTRAVENOUS at 19:57

## 2021-01-01 RX ADMIN — Medication 10 MILLIGRAM(S): at 13:04

## 2021-01-01 RX ADMIN — CHLORHEXIDINE GLUCONATE 1 APPLICATION(S): 213 SOLUTION TOPICAL at 05:53

## 2021-01-01 RX ADMIN — CHLORHEXIDINE GLUCONATE 15 MILLILITER(S): 213 SOLUTION TOPICAL at 05:35

## 2021-01-01 RX ADMIN — Medication 650 MILLIGRAM(S): at 00:35

## 2021-01-01 RX ADMIN — SODIUM CHLORIDE 3 MILLILITER(S): 9 INJECTION INTRAMUSCULAR; INTRAVENOUS; SUBCUTANEOUS at 00:06

## 2021-01-01 RX ADMIN — CEFEPIME 100 MILLIGRAM(S): 1 INJECTION, POWDER, FOR SOLUTION INTRAMUSCULAR; INTRAVENOUS at 05:23

## 2021-01-01 RX ADMIN — Medication 125 MILLIGRAM(S): at 12:13

## 2021-01-01 RX ADMIN — HEPARIN SODIUM 5000 UNIT(S): 5000 INJECTION INTRAVENOUS; SUBCUTANEOUS at 22:49

## 2021-01-01 RX ADMIN — SODIUM CHLORIDE 3 MILLILITER(S): 9 INJECTION INTRAMUSCULAR; INTRAVENOUS; SUBCUTANEOUS at 06:16

## 2021-01-01 RX ADMIN — FENTANYL CITRATE 12.5 MICROGRAM(S): 50 INJECTION INTRAVENOUS at 21:55

## 2021-01-01 RX ADMIN — SODIUM CHLORIDE 50 MILLILITER(S): 9 INJECTION, SOLUTION INTRAVENOUS at 17:50

## 2021-01-01 RX ADMIN — Medication 50 MILLIEQUIVALENT(S): at 09:28

## 2021-01-01 RX ADMIN — Medication 500 MILLIGRAM(S): at 11:49

## 2021-01-01 RX ADMIN — Medication 125 MILLIGRAM(S): at 18:36

## 2021-01-01 RX ADMIN — MIRTAZAPINE 7.5 MILLIGRAM(S): 45 TABLET, ORALLY DISINTEGRATING ORAL at 21:53

## 2021-01-01 RX ADMIN — PIPERACILLIN AND TAZOBACTAM 25 GRAM(S): 4; .5 INJECTION, POWDER, LYOPHILIZED, FOR SOLUTION INTRAVENOUS at 16:12

## 2021-01-01 RX ADMIN — Medication 10 MILLIGRAM(S): at 05:26

## 2021-01-01 RX ADMIN — Medication 125 MILLIGRAM(S): at 11:09

## 2021-01-01 RX ADMIN — HEPARIN SODIUM 12 UNIT(S)/HR: 5000 INJECTION INTRAVENOUS; SUBCUTANEOUS at 16:56

## 2021-01-01 RX ADMIN — FENTANYL CITRATE 25 MICROGRAM(S): 50 INJECTION INTRAVENOUS at 08:25

## 2021-01-01 RX ADMIN — Medication 5 MILLIGRAM(S): at 05:52

## 2021-01-01 RX ADMIN — ENOXAPARIN SODIUM 40 MILLIGRAM(S): 100 INJECTION SUBCUTANEOUS at 17:12

## 2021-01-01 RX ADMIN — CHLORHEXIDINE GLUCONATE 1 APPLICATION(S): 213 SOLUTION TOPICAL at 05:23

## 2021-01-01 RX ADMIN — Medication 1: at 18:45

## 2021-01-01 RX ADMIN — MAGNESIUM OXIDE 400 MG ORAL TABLET 400 MILLIGRAM(S): 241.3 TABLET ORAL at 06:47

## 2021-01-01 RX ADMIN — Medication 250 MILLIGRAM(S): at 06:25

## 2021-01-01 RX ADMIN — Medication 5 MILLIGRAM(S): at 13:57

## 2021-01-01 RX ADMIN — SERTRALINE 100 MILLIGRAM(S): 25 TABLET, FILM COATED ORAL at 12:26

## 2021-01-01 RX ADMIN — VASOPRESSIN 1 UNIT(S)/MIN: 20 INJECTION INTRAVENOUS at 18:43

## 2021-01-01 RX ADMIN — SPIRONOLACTONE 12.5 MILLIGRAM(S): 25 TABLET, FILM COATED ORAL at 05:17

## 2021-01-01 RX ADMIN — Medication 10 MILLIGRAM(S): at 21:39

## 2021-01-01 RX ADMIN — CEFEPIME 100 MILLIGRAM(S): 1 INJECTION, POWDER, FOR SOLUTION INTRAMUSCULAR; INTRAVENOUS at 13:56

## 2021-01-01 RX ADMIN — Medication 1 TABLET(S): at 06:30

## 2021-01-01 RX ADMIN — CHLORHEXIDINE GLUCONATE 15 MILLILITER(S): 213 SOLUTION TOPICAL at 18:41

## 2021-01-01 RX ADMIN — SIMETHICONE 80 MILLIGRAM(S): 80 TABLET, CHEWABLE ORAL at 00:42

## 2021-01-01 RX ADMIN — Medication 125 MILLIGRAM(S): at 17:49

## 2021-01-01 RX ADMIN — PANTOPRAZOLE SODIUM 40 MILLIGRAM(S): 20 TABLET, DELAYED RELEASE ORAL at 18:15

## 2021-01-01 RX ADMIN — Medication 10 MILLIGRAM(S): at 06:14

## 2021-01-01 RX ADMIN — PANTOPRAZOLE SODIUM 40 MILLIGRAM(S): 20 TABLET, DELAYED RELEASE ORAL at 17:54

## 2021-01-01 RX ADMIN — Medication 50 MILLIGRAM(S): at 21:33

## 2021-01-01 RX ADMIN — Medication 25 MILLIGRAM(S): at 13:13

## 2021-01-01 RX ADMIN — Medication 0.5 MILLIGRAM(S): at 22:13

## 2021-01-01 RX ADMIN — Medication 50 MILLIEQUIVALENT(S): at 23:55

## 2021-01-01 RX ADMIN — HYDROMORPHONE HYDROCHLORIDE 0.5 MILLIGRAM(S): 2 INJECTION INTRAMUSCULAR; INTRAVENOUS; SUBCUTANEOUS at 22:15

## 2021-01-01 RX ADMIN — Medication 62.5 MILLIMOLE(S): at 07:25

## 2021-01-01 RX ADMIN — ONDANSETRON 8 MILLIGRAM(S): 8 TABLET, FILM COATED ORAL at 05:28

## 2021-01-01 RX ADMIN — Medication 1: at 11:41

## 2021-01-01 RX ADMIN — Medication 100 MILLIGRAM(S): at 12:31

## 2021-01-01 RX ADMIN — Medication 10 MILLIGRAM(S): at 23:06

## 2021-01-01 RX ADMIN — PANTOPRAZOLE SODIUM 40 MILLIGRAM(S): 20 TABLET, DELAYED RELEASE ORAL at 06:02

## 2021-01-01 RX ADMIN — Medication 10 MILLIGRAM(S): at 14:43

## 2021-01-01 RX ADMIN — CHLORHEXIDINE GLUCONATE 15 MILLILITER(S): 213 SOLUTION TOPICAL at 05:19

## 2021-01-01 RX ADMIN — SPIRONOLACTONE 12.5 MILLIGRAM(S): 25 TABLET, FILM COATED ORAL at 18:08

## 2021-01-01 RX ADMIN — Medication 10 MILLIGRAM(S): at 13:16

## 2021-01-01 RX ADMIN — Medication 650 MILLIGRAM(S): at 01:22

## 2021-01-01 RX ADMIN — Medication 50 MILLIEQUIVALENT(S): at 09:16

## 2021-01-01 RX ADMIN — Medication 2.5 MILLIGRAM(S): at 03:52

## 2021-01-01 RX ADMIN — Medication 50 GRAM(S): at 04:20

## 2021-01-01 RX ADMIN — CEFEPIME 100 MILLIGRAM(S): 1 INJECTION, POWDER, FOR SOLUTION INTRAMUSCULAR; INTRAVENOUS at 14:05

## 2021-01-01 RX ADMIN — Medication 0.5 MILLIGRAM(S): at 05:15

## 2021-01-01 RX ADMIN — CHLORHEXIDINE GLUCONATE 15 MILLILITER(S): 213 SOLUTION TOPICAL at 07:16

## 2021-01-01 RX ADMIN — Medication 40 MILLIGRAM(S): at 05:38

## 2021-01-01 RX ADMIN — SCOPALAMINE 1 PATCH: 1 PATCH, EXTENDED RELEASE TRANSDERMAL at 07:26

## 2021-01-01 RX ADMIN — CEFEPIME 100 MILLIGRAM(S): 1 INJECTION, POWDER, FOR SOLUTION INTRAMUSCULAR; INTRAVENOUS at 23:16

## 2021-01-01 RX ADMIN — Medication 10 MILLIGRAM(S): at 21:11

## 2021-01-01 RX ADMIN — Medication 10 MILLIGRAM(S): at 05:13

## 2021-01-01 RX ADMIN — WARFARIN SODIUM 5 MILLIGRAM(S): 2.5 TABLET ORAL at 21:44

## 2021-01-01 RX ADMIN — SODIUM CHLORIDE 500 MILLILITER(S): 9 INJECTION INTRAMUSCULAR; INTRAVENOUS; SUBCUTANEOUS at 22:44

## 2021-01-01 RX ADMIN — SPIRONOLACTONE 25 MILLIGRAM(S): 25 TABLET, FILM COATED ORAL at 06:22

## 2021-01-01 RX ADMIN — CHLORHEXIDINE GLUCONATE 15 MILLILITER(S): 213 SOLUTION TOPICAL at 17:03

## 2021-01-01 RX ADMIN — PANTOPRAZOLE SODIUM 40 MILLIGRAM(S): 20 TABLET, DELAYED RELEASE ORAL at 17:38

## 2021-01-01 RX ADMIN — PIPERACILLIN AND TAZOBACTAM 25 GRAM(S): 4; .5 INJECTION, POWDER, LYOPHILIZED, FOR SOLUTION INTRAVENOUS at 23:55

## 2021-01-01 RX ADMIN — SODIUM CHLORIDE 3 MILLILITER(S): 9 INJECTION INTRAMUSCULAR; INTRAVENOUS; SUBCUTANEOUS at 05:52

## 2021-01-01 RX ADMIN — Medication 20 MILLIGRAM(S): at 22:40

## 2021-01-01 RX ADMIN — ONDANSETRON 4 MILLIGRAM(S): 8 TABLET, FILM COATED ORAL at 11:57

## 2021-01-01 RX ADMIN — Medication 125 MILLIGRAM(S): at 05:04

## 2021-01-01 RX ADMIN — Medication 10 MILLIGRAM(S): at 22:09

## 2021-01-01 RX ADMIN — CHLORHEXIDINE GLUCONATE 1 APPLICATION(S): 213 SOLUTION TOPICAL at 06:17

## 2021-01-01 RX ADMIN — REMDESIVIR 500 MILLIGRAM(S): 5 INJECTION INTRAVENOUS at 17:12

## 2021-01-01 RX ADMIN — Medication 650 MILLIGRAM(S): at 14:09

## 2021-01-01 RX ADMIN — Medication 25 GRAM(S): at 08:41

## 2021-01-01 RX ADMIN — CHLORHEXIDINE GLUCONATE 1 APPLICATION(S): 213 SOLUTION TOPICAL at 09:38

## 2021-01-01 RX ADMIN — CHLORHEXIDINE GLUCONATE 15 MILLILITER(S): 213 SOLUTION TOPICAL at 05:33

## 2021-01-01 RX ADMIN — Medication 100 GRAM(S): at 21:37

## 2021-01-01 RX ADMIN — OCTREOTIDE ACETATE 50 MICROGRAM(S): 200 INJECTION, SOLUTION INTRAVENOUS; SUBCUTANEOUS at 21:03

## 2021-01-01 RX ADMIN — SERTRALINE 100 MILLIGRAM(S): 25 TABLET, FILM COATED ORAL at 11:26

## 2021-01-01 RX ADMIN — Medication 1 TABLET(S): at 05:21

## 2021-01-01 RX ADMIN — SERTRALINE 100 MILLIGRAM(S): 25 TABLET, FILM COATED ORAL at 13:26

## 2021-01-01 RX ADMIN — Medication 83.33 MILLIMOLE(S): at 16:50

## 2021-01-01 RX ADMIN — CHLORHEXIDINE GLUCONATE 1 APPLICATION(S): 213 SOLUTION TOPICAL at 08:26

## 2021-01-01 RX ADMIN — Medication 250 MILLIGRAM(S): at 11:02

## 2021-01-01 RX ADMIN — SODIUM CHLORIDE 10 MILLILITER(S): 9 INJECTION INTRAMUSCULAR; INTRAVENOUS; SUBCUTANEOUS at 08:46

## 2021-01-01 RX ADMIN — ONDANSETRON 4 MILLIGRAM(S): 8 TABLET, FILM COATED ORAL at 05:04

## 2021-01-01 RX ADMIN — SODIUM CHLORIDE 3 MILLILITER(S): 9 INJECTION INTRAMUSCULAR; INTRAVENOUS; SUBCUTANEOUS at 22:17

## 2021-01-01 RX ADMIN — Medication 5 MILLIGRAM(S): at 14:04

## 2021-01-01 RX ADMIN — Medication 500 MILLIGRAM(S): at 00:33

## 2021-01-01 RX ADMIN — Medication 650 MILLIGRAM(S): at 13:30

## 2021-01-01 RX ADMIN — CEFEPIME 100 MILLIGRAM(S): 1 INJECTION, POWDER, FOR SOLUTION INTRAMUSCULAR; INTRAVENOUS at 06:41

## 2021-01-01 RX ADMIN — CHLORHEXIDINE GLUCONATE 1 APPLICATION(S): 213 SOLUTION TOPICAL at 08:51

## 2021-01-01 RX ADMIN — MEROPENEM 100 MILLIGRAM(S): 1 INJECTION INTRAVENOUS at 13:42

## 2021-01-01 RX ADMIN — PANTOPRAZOLE SODIUM 40 MILLIGRAM(S): 20 TABLET, DELAYED RELEASE ORAL at 17:01

## 2021-01-01 RX ADMIN — Medication 5 MILLIGRAM(S): at 12:54

## 2021-01-01 RX ADMIN — Medication 100 MILLIGRAM(S): at 18:07

## 2021-01-01 RX ADMIN — Medication 125 MILLIGRAM(S): at 05:26

## 2021-01-01 RX ADMIN — POTASSIUM PHOSPHATE, MONOBASIC POTASSIUM PHOSPHATE, DIBASIC 62.5 MILLIMOLE(S): 236; 224 INJECTION, SOLUTION INTRAVENOUS at 02:08

## 2021-01-01 RX ADMIN — AMIODARONE HYDROCHLORIDE 200 MILLIGRAM(S): 400 TABLET ORAL at 05:00

## 2021-01-01 RX ADMIN — Medication 100 MILLIGRAM(S): at 14:36

## 2021-01-01 RX ADMIN — Medication 81 MILLIGRAM(S): at 21:32

## 2021-01-01 RX ADMIN — Medication 125 MILLILITER(S): at 20:48

## 2021-01-01 RX ADMIN — PANTOPRAZOLE SODIUM 40 MILLIGRAM(S): 20 TABLET, DELAYED RELEASE ORAL at 05:15

## 2021-01-01 RX ADMIN — Medication 125 MILLIGRAM(S): at 22:03

## 2021-01-01 RX ADMIN — MIRTAZAPINE 7.5 MILLIGRAM(S): 45 TABLET, ORALLY DISINTEGRATING ORAL at 13:10

## 2021-01-01 RX ADMIN — Medication 10 MILLIGRAM(S): at 21:22

## 2021-01-01 RX ADMIN — Medication 40 MILLIGRAM(S): at 15:00

## 2021-01-01 RX ADMIN — Medication 100 MILLIGRAM(S): at 11:33

## 2021-01-01 RX ADMIN — HYDROMORPHONE HYDROCHLORIDE 0.5 MILLIGRAM(S): 2 INJECTION INTRAMUSCULAR; INTRAVENOUS; SUBCUTANEOUS at 15:40

## 2021-01-01 RX ADMIN — Medication 6: at 00:31

## 2021-01-01 RX ADMIN — Medication 12.5 MILLIGRAM(S): at 13:51

## 2021-01-01 RX ADMIN — DEXMEDETOMIDINE HYDROCHLORIDE IN 0.9% SODIUM CHLORIDE 23.1 MICROGRAM(S)/KG/HR: 4 INJECTION INTRAVENOUS at 14:38

## 2021-01-01 RX ADMIN — Medication 500 MILLIGRAM(S): at 17:38

## 2021-01-01 RX ADMIN — OCTREOTIDE ACETATE 50 MICROGRAM(S): 200 INJECTION, SOLUTION INTRAVENOUS; SUBCUTANEOUS at 14:53

## 2021-01-01 RX ADMIN — Medication 10 MILLIGRAM(S): at 05:52

## 2021-01-01 RX ADMIN — SERTRALINE 100 MILLIGRAM(S): 25 TABLET, FILM COATED ORAL at 11:40

## 2021-01-01 RX ADMIN — Medication 650 MILLIGRAM(S): at 12:58

## 2021-01-01 RX ADMIN — Medication 300 MILLIGRAM(S): at 17:59

## 2021-01-01 RX ADMIN — Medication 10 MILLIGRAM(S): at 05:20

## 2021-01-01 RX ADMIN — Medication 10 MILLIGRAM(S): at 06:36

## 2021-01-01 RX ADMIN — MEROPENEM 100 MILLIGRAM(S): 1 INJECTION INTRAVENOUS at 05:28

## 2021-01-01 RX ADMIN — PANTOPRAZOLE SODIUM 40 MILLIGRAM(S): 20 TABLET, DELAYED RELEASE ORAL at 05:36

## 2021-01-01 RX ADMIN — Medication 125 MILLILITER(S): at 09:45

## 2021-01-01 RX ADMIN — Medication 1 PACKET(S): at 12:11

## 2021-01-01 RX ADMIN — OCTREOTIDE ACETATE 50 MICROGRAM(S): 200 INJECTION, SOLUTION INTRAVENOUS; SUBCUTANEOUS at 22:23

## 2021-01-01 RX ADMIN — CEFEPIME 100 MILLIGRAM(S): 1 INJECTION, POWDER, FOR SOLUTION INTRAMUSCULAR; INTRAVENOUS at 02:56

## 2021-01-01 RX ADMIN — Medication 50 MILLIEQUIVALENT(S): at 01:18

## 2021-01-01 RX ADMIN — Medication 50 MILLIEQUIVALENT(S): at 13:44

## 2021-01-01 RX ADMIN — PANTOPRAZOLE SODIUM 40 MILLIGRAM(S): 20 TABLET, DELAYED RELEASE ORAL at 05:28

## 2021-01-01 RX ADMIN — Medication 250 MILLIGRAM(S): at 08:16

## 2021-01-01 RX ADMIN — CHLORHEXIDINE GLUCONATE 1 APPLICATION(S): 213 SOLUTION TOPICAL at 05:11

## 2021-01-01 RX ADMIN — Medication 40 MILLIEQUIVALENT(S): at 09:03

## 2021-01-01 RX ADMIN — Medication 50 MILLIEQUIVALENT(S): at 02:57

## 2021-01-01 RX ADMIN — Medication 125 MILLILITER(S): at 03:25

## 2021-01-01 RX ADMIN — FENTANYL CITRATE 50 MICROGRAM(S): 50 INJECTION INTRAVENOUS at 01:00

## 2021-01-01 RX ADMIN — Medication 1500 MILLILITER(S): at 22:45

## 2021-01-01 RX ADMIN — Medication 0.5 MILLIGRAM(S): at 21:41

## 2021-01-01 RX ADMIN — INSULIN HUMAN 3 UNIT(S)/HR: 100 INJECTION, SOLUTION SUBCUTANEOUS at 05:37

## 2021-01-01 RX ADMIN — Medication 10 MILLIGRAM(S): at 05:14

## 2021-01-01 RX ADMIN — OCTREOTIDE ACETATE 50 MICROGRAM(S): 200 INJECTION, SOLUTION INTRAVENOUS; SUBCUTANEOUS at 14:18

## 2021-01-01 RX ADMIN — HYDROMORPHONE HYDROCHLORIDE 0.5 MILLIGRAM(S): 2 INJECTION INTRAMUSCULAR; INTRAVENOUS; SUBCUTANEOUS at 00:20

## 2021-01-01 RX ADMIN — Medication 100 MILLIGRAM(S): at 14:06

## 2021-01-01 RX ADMIN — PANTOPRAZOLE SODIUM 40 MILLIGRAM(S): 20 TABLET, DELAYED RELEASE ORAL at 17:24

## 2021-01-01 RX ADMIN — Medication 100 MILLIGRAM(S): at 21:03

## 2021-01-01 RX ADMIN — Medication 125 MILLIGRAM(S): at 05:25

## 2021-01-01 RX ADMIN — Medication 500 MILLIGRAM(S): at 17:13

## 2021-01-01 RX ADMIN — Medication 50 MILLIEQUIVALENT(S): at 06:49

## 2021-01-01 RX ADMIN — MAGNESIUM OXIDE 400 MG ORAL TABLET 400 MILLIGRAM(S): 241.3 TABLET ORAL at 17:58

## 2021-01-01 RX ADMIN — PANTOPRAZOLE SODIUM 40 MILLIGRAM(S): 20 TABLET, DELAYED RELEASE ORAL at 05:23

## 2021-01-01 RX ADMIN — Medication 100 MILLIGRAM(S): at 22:15

## 2021-01-01 RX ADMIN — Medication 125 MILLIGRAM(S): at 12:00

## 2021-01-01 RX ADMIN — POTASSIUM PHOSPHATE, MONOBASIC POTASSIUM PHOSPHATE, DIBASIC 62.5 MILLIMOLE(S): 236; 224 INJECTION, SOLUTION INTRAVENOUS at 06:15

## 2021-01-01 RX ADMIN — Medication 500 MILLIGRAM(S): at 05:18

## 2021-01-01 RX ADMIN — Medication 400 MILLIGRAM(S): at 06:01

## 2021-01-01 RX ADMIN — CEFEPIME 100 MILLIGRAM(S): 1 INJECTION, POWDER, FOR SOLUTION INTRAMUSCULAR; INTRAVENOUS at 05:36

## 2021-01-01 RX ADMIN — CEFEPIME 100 MILLIGRAM(S): 1 INJECTION, POWDER, FOR SOLUTION INTRAMUSCULAR; INTRAVENOUS at 21:37

## 2021-01-01 RX ADMIN — Medication 15 GRAM(S): at 12:55

## 2021-01-01 RX ADMIN — ENOXAPARIN SODIUM 40 MILLIGRAM(S): 100 INJECTION SUBCUTANEOUS at 06:25

## 2021-01-01 RX ADMIN — MIRTAZAPINE 7.5 MILLIGRAM(S): 45 TABLET, ORALLY DISINTEGRATING ORAL at 15:20

## 2021-01-01 RX ADMIN — SERTRALINE 100 MILLIGRAM(S): 25 TABLET, FILM COATED ORAL at 13:13

## 2021-01-01 RX ADMIN — PANTOPRAZOLE SODIUM 40 MILLIGRAM(S): 20 TABLET, DELAYED RELEASE ORAL at 05:48

## 2021-01-01 RX ADMIN — HEPARIN SODIUM 5000 UNIT(S): 5000 INJECTION INTRAVENOUS; SUBCUTANEOUS at 13:54

## 2021-01-01 RX ADMIN — PANTOPRAZOLE SODIUM 40 MILLIGRAM(S): 20 TABLET, DELAYED RELEASE ORAL at 06:14

## 2021-01-01 RX ADMIN — PANTOPRAZOLE SODIUM 40 MILLIGRAM(S): 20 TABLET, DELAYED RELEASE ORAL at 17:16

## 2021-01-01 RX ADMIN — Medication 100 MILLIGRAM(S): at 12:05

## 2021-01-01 RX ADMIN — CHLORHEXIDINE GLUCONATE 15 MILLILITER(S): 213 SOLUTION TOPICAL at 05:03

## 2021-01-01 RX ADMIN — Medication 125 MILLIGRAM(S): at 11:28

## 2021-01-01 RX ADMIN — PIPERACILLIN AND TAZOBACTAM 200 GRAM(S): 4; .5 INJECTION, POWDER, LYOPHILIZED, FOR SOLUTION INTRAVENOUS at 17:30

## 2021-01-01 RX ADMIN — Medication 10 MILLIGRAM(S): at 07:00

## 2021-01-01 RX ADMIN — Medication 40 MILLIEQUIVALENT(S): at 22:11

## 2021-01-01 RX ADMIN — Medication 50 MILLIEQUIVALENT(S): at 04:23

## 2021-01-01 RX ADMIN — PANTOPRAZOLE SODIUM 40 MILLIGRAM(S): 20 TABLET, DELAYED RELEASE ORAL at 07:11

## 2021-01-01 RX ADMIN — Medication 650 MILLIGRAM(S): at 19:35

## 2021-01-01 RX ADMIN — Medication 25 MILLIGRAM(S): at 21:24

## 2021-01-01 RX ADMIN — Medication 5 MILLIGRAM(S): at 12:40

## 2021-01-01 RX ADMIN — SERTRALINE 100 MILLIGRAM(S): 25 TABLET, FILM COATED ORAL at 12:16

## 2021-01-01 RX ADMIN — HUMAN INSULIN 4 UNIT(S): 100 INJECTION, SUSPENSION SUBCUTANEOUS at 05:56

## 2021-01-01 RX ADMIN — Medication 100 MILLIGRAM(S): at 05:00

## 2021-01-01 RX ADMIN — SPIRONOLACTONE 12.5 MILLIGRAM(S): 25 TABLET, FILM COATED ORAL at 17:46

## 2021-01-01 RX ADMIN — ONDANSETRON 4 MILLIGRAM(S): 8 TABLET, FILM COATED ORAL at 20:00

## 2021-01-01 RX ADMIN — SIMETHICONE 80 MILLIGRAM(S): 80 TABLET, CHEWABLE ORAL at 20:48

## 2021-01-01 RX ADMIN — MIDAZOLAM HYDROCHLORIDE 2 MILLIGRAM(S): 1 INJECTION, SOLUTION INTRAMUSCULAR; INTRAVENOUS at 08:18

## 2021-01-01 RX ADMIN — Medication 50 MILLIGRAM(S): at 06:02

## 2021-01-01 RX ADMIN — Medication 100 MILLIEQUIVALENT(S): at 17:05

## 2021-01-01 RX ADMIN — Medication 81 MILLIGRAM(S): at 17:04

## 2021-01-01 RX ADMIN — HUMAN INSULIN 10 UNIT(S): 100 INJECTION, SUSPENSION SUBCUTANEOUS at 01:18

## 2021-01-01 RX ADMIN — Medication 125 MILLIGRAM(S): at 23:02

## 2021-01-01 RX ADMIN — AMIODARONE HYDROCHLORIDE 200 MILLIGRAM(S): 400 TABLET ORAL at 05:39

## 2021-01-01 RX ADMIN — DEXTROSE MONOHYDRATE, SODIUM CHLORIDE, AND POTASSIUM CHLORIDE 30 MILLILITER(S): 50; .745; 4.5 INJECTION, SOLUTION INTRAVENOUS at 22:38

## 2021-01-01 RX ADMIN — HEPARIN SODIUM 5000 UNIT(S): 5000 INJECTION INTRAVENOUS; SUBCUTANEOUS at 13:05

## 2021-01-01 RX ADMIN — Medication 2: at 12:35

## 2021-01-01 RX ADMIN — OCTREOTIDE ACETATE 50 MICROGRAM(S): 200 INJECTION, SOLUTION INTRAVENOUS; SUBCUTANEOUS at 23:15

## 2021-01-01 RX ADMIN — Medication 100 MILLIGRAM(S): at 13:54

## 2021-01-01 RX ADMIN — PANTOPRAZOLE SODIUM 40 MILLIGRAM(S): 20 TABLET, DELAYED RELEASE ORAL at 18:41

## 2021-01-01 RX ADMIN — Medication 500 MILLIGRAM(S): at 12:23

## 2021-01-01 RX ADMIN — Medication 2: at 14:59

## 2021-01-01 RX ADMIN — Medication 500 MILLIGRAM(S): at 05:12

## 2021-01-01 RX ADMIN — PANTOPRAZOLE SODIUM 40 MILLIGRAM(S): 20 TABLET, DELAYED RELEASE ORAL at 06:13

## 2021-01-01 RX ADMIN — Medication 3 MILLIGRAM(S): at 05:11

## 2021-01-01 RX ADMIN — Medication 50 MILLIEQUIVALENT(S): at 20:40

## 2021-01-01 RX ADMIN — Medication 10 MILLIGRAM(S): at 18:00

## 2021-01-01 RX ADMIN — ONDANSETRON 8 MILLIGRAM(S): 8 TABLET, FILM COATED ORAL at 05:37

## 2021-01-01 RX ADMIN — VASOPRESSIN 2.4 UNIT(S)/MIN: 20 INJECTION INTRAVENOUS at 17:15

## 2021-01-01 RX ADMIN — HEPARIN SODIUM 5000 UNIT(S): 5000 INJECTION INTRAVENOUS; SUBCUTANEOUS at 05:24

## 2021-01-01 RX ADMIN — Medication 12.5 MILLIGRAM(S): at 13:40

## 2021-01-01 RX ADMIN — PANTOPRAZOLE SODIUM 40 MILLIGRAM(S): 20 TABLET, DELAYED RELEASE ORAL at 05:59

## 2021-01-01 RX ADMIN — Medication 1: at 05:12

## 2021-01-01 RX ADMIN — Medication 250 MILLIGRAM(S): at 05:02

## 2021-01-01 RX ADMIN — Medication 50 MILLIGRAM(S): at 21:19

## 2021-01-01 RX ADMIN — Medication 125 MILLIGRAM(S): at 17:52

## 2021-01-01 RX ADMIN — PANTOPRAZOLE SODIUM 40 MILLIGRAM(S): 20 TABLET, DELAYED RELEASE ORAL at 18:22

## 2021-01-01 RX ADMIN — Medication 62.5 MILLIMOLE(S): at 01:36

## 2021-01-01 RX ADMIN — PIPERACILLIN AND TAZOBACTAM 200 GRAM(S): 4; .5 INJECTION, POWDER, LYOPHILIZED, FOR SOLUTION INTRAVENOUS at 11:11

## 2021-01-01 RX ADMIN — SERTRALINE 100 MILLIGRAM(S): 25 TABLET, FILM COATED ORAL at 12:49

## 2021-01-01 RX ADMIN — FENTANYL CITRATE 12.5 MICROGRAM(S): 50 INJECTION INTRAVENOUS at 16:45

## 2021-01-01 RX ADMIN — CHLORHEXIDINE GLUCONATE 15 MILLILITER(S): 213 SOLUTION TOPICAL at 18:06

## 2021-01-01 RX ADMIN — SERTRALINE 100 MILLIGRAM(S): 25 TABLET, FILM COATED ORAL at 11:09

## 2021-01-01 RX ADMIN — PANTOPRAZOLE SODIUM 40 MILLIGRAM(S): 20 TABLET, DELAYED RELEASE ORAL at 06:09

## 2021-01-01 RX ADMIN — CHLORHEXIDINE GLUCONATE 1 APPLICATION(S): 213 SOLUTION TOPICAL at 05:12

## 2021-01-01 RX ADMIN — Medication 1 TABLET(S): at 11:20

## 2021-01-01 RX ADMIN — CHLORHEXIDINE GLUCONATE 15 MILLILITER(S): 213 SOLUTION TOPICAL at 17:44

## 2021-01-01 RX ADMIN — ONDANSETRON 8 MILLIGRAM(S): 8 TABLET, FILM COATED ORAL at 05:30

## 2021-01-01 RX ADMIN — MIRTAZAPINE 7.5 MILLIGRAM(S): 45 TABLET, ORALLY DISINTEGRATING ORAL at 12:56

## 2021-01-01 RX ADMIN — PANTOPRAZOLE SODIUM 40 MILLIGRAM(S): 20 TABLET, DELAYED RELEASE ORAL at 05:33

## 2021-01-01 RX ADMIN — Medication 0.5 MILLIGRAM(S): at 21:46

## 2021-01-01 RX ADMIN — Medication 318.75 MILLIGRAM(S): at 07:44

## 2021-01-01 RX ADMIN — Medication 10 MILLIGRAM(S): at 22:32

## 2021-01-01 RX ADMIN — Medication 125 MILLIGRAM(S): at 05:51

## 2021-01-01 RX ADMIN — CEFEPIME 100 MILLIGRAM(S): 1 INJECTION, POWDER, FOR SOLUTION INTRAMUSCULAR; INTRAVENOUS at 13:06

## 2021-01-01 RX ADMIN — Medication 20 MILLIGRAM(S): at 23:16

## 2021-01-01 RX ADMIN — Medication 25 MILLIGRAM(S): at 22:12

## 2021-01-01 RX ADMIN — Medication 10 MILLIGRAM(S): at 06:08

## 2021-01-01 RX ADMIN — Medication 10 MILLIGRAM(S): at 13:15

## 2021-01-01 RX ADMIN — HUMAN INSULIN 6 UNIT(S): 100 INJECTION, SUSPENSION SUBCUTANEOUS at 12:00

## 2021-01-01 RX ADMIN — Medication 1 TABLET(S): at 11:36

## 2021-01-01 RX ADMIN — CHLORHEXIDINE GLUCONATE 15 MILLILITER(S): 213 SOLUTION TOPICAL at 05:11

## 2021-01-01 RX ADMIN — HEPARIN SODIUM 5000 UNIT(S): 5000 INJECTION INTRAVENOUS; SUBCUTANEOUS at 05:07

## 2021-01-01 RX ADMIN — HUMAN INSULIN 10 UNIT(S): 100 INJECTION, SUSPENSION SUBCUTANEOUS at 00:07

## 2021-01-01 RX ADMIN — Medication 3 MILLIGRAM(S): at 05:25

## 2021-01-01 RX ADMIN — MIRTAZAPINE 7.5 MILLIGRAM(S): 45 TABLET, ORALLY DISINTEGRATING ORAL at 12:00

## 2021-01-01 RX ADMIN — HUMAN INSULIN 6 UNIT(S): 100 INJECTION, SUSPENSION SUBCUTANEOUS at 05:12

## 2021-01-01 RX ADMIN — Medication 100 MILLIGRAM(S): at 13:00

## 2021-01-01 RX ADMIN — Medication 50 MILLIEQUIVALENT(S): at 01:43

## 2021-01-01 RX ADMIN — Medication 83.33 MILLIMOLE(S): at 03:06

## 2021-01-01 RX ADMIN — PANTOPRAZOLE SODIUM 40 MILLIGRAM(S): 20 TABLET, DELAYED RELEASE ORAL at 17:10

## 2021-01-01 RX ADMIN — HUMAN INSULIN 12 UNIT(S): 100 INJECTION, SUSPENSION SUBCUTANEOUS at 06:08

## 2021-01-01 RX ADMIN — Medication 10 MILLIGRAM(S): at 06:16

## 2021-01-01 RX ADMIN — PROPOFOL 11.8 MICROGRAM(S)/KG/MIN: 10 INJECTION, EMULSION INTRAVENOUS at 05:34

## 2021-01-01 RX ADMIN — SODIUM CHLORIDE 1500 MILLILITER(S): 9 INJECTION INTRAMUSCULAR; INTRAVENOUS; SUBCUTANEOUS at 02:05

## 2021-01-01 RX ADMIN — OCTREOTIDE ACETATE 50 MICROGRAM(S): 200 INJECTION, SOLUTION INTRAVENOUS; SUBCUTANEOUS at 13:17

## 2021-01-01 RX ADMIN — Medication 40 MILLIGRAM(S): at 05:22

## 2021-01-01 RX ADMIN — HEPARIN SODIUM 5000 UNIT(S): 5000 INJECTION INTRAVENOUS; SUBCUTANEOUS at 21:52

## 2021-01-01 RX ADMIN — Medication 20 MILLIEQUIVALENT(S): at 11:41

## 2021-01-01 RX ADMIN — ENOXAPARIN SODIUM 40 MILLIGRAM(S): 100 INJECTION SUBCUTANEOUS at 05:59

## 2021-01-01 RX ADMIN — CEFEPIME 100 MILLIGRAM(S): 1 INJECTION, POWDER, FOR SOLUTION INTRAMUSCULAR; INTRAVENOUS at 15:17

## 2021-01-01 RX ADMIN — Medication 40 MILLIGRAM(S): at 15:28

## 2021-01-01 RX ADMIN — PANTOPRAZOLE SODIUM 40 MILLIGRAM(S): 20 TABLET, DELAYED RELEASE ORAL at 05:12

## 2021-01-01 RX ADMIN — CHLORHEXIDINE GLUCONATE 15 MILLILITER(S): 213 SOLUTION TOPICAL at 17:46

## 2021-01-01 RX ADMIN — Medication 10 MILLIGRAM(S): at 06:00

## 2021-01-01 RX ADMIN — PANTOPRAZOLE SODIUM 40 MILLIGRAM(S): 20 TABLET, DELAYED RELEASE ORAL at 17:20

## 2021-01-01 RX ADMIN — HEPARIN SODIUM 5000 UNIT(S): 5000 INJECTION INTRAVENOUS; SUBCUTANEOUS at 06:15

## 2021-01-01 RX ADMIN — Medication 25 MILLIGRAM(S): at 17:38

## 2021-01-01 RX ADMIN — Medication 125 MILLIGRAM(S): at 11:18

## 2021-01-01 RX ADMIN — Medication 0.25 MILLIGRAM(S): at 21:20

## 2021-01-01 RX ADMIN — SODIUM CHLORIDE 3 MILLILITER(S): 9 INJECTION INTRAMUSCULAR; INTRAVENOUS; SUBCUTANEOUS at 06:00

## 2021-01-01 RX ADMIN — CHLORHEXIDINE GLUCONATE 1 APPLICATION(S): 213 SOLUTION TOPICAL at 05:19

## 2021-01-01 RX ADMIN — Medication 10 MILLIGRAM(S): at 13:25

## 2021-01-01 RX ADMIN — HEPARIN SODIUM 5000 UNIT(S): 5000 INJECTION INTRAVENOUS; SUBCUTANEOUS at 21:28

## 2021-01-01 RX ADMIN — SENNA PLUS 2 TABLET(S): 8.6 TABLET ORAL at 21:58

## 2021-01-01 RX ADMIN — Medication 50 MILLIEQUIVALENT(S): at 07:53

## 2021-01-01 RX ADMIN — Medication 500 MILLIGRAM(S): at 05:00

## 2021-01-01 RX ADMIN — Medication 10 MILLIGRAM(S): at 05:03

## 2021-01-01 RX ADMIN — DEXMEDETOMIDINE HYDROCHLORIDE IN 0.9% SODIUM CHLORIDE 9.81 MICROGRAM(S)/KG/HR: 4 INJECTION INTRAVENOUS at 20:15

## 2021-01-01 RX ADMIN — Medication 125 MILLILITER(S): at 20:30

## 2021-01-01 RX ADMIN — Medication 650 MILLIGRAM(S): at 23:08

## 2021-01-01 RX ADMIN — Medication 10 MILLIGRAM(S): at 21:55

## 2021-01-01 RX ADMIN — MAGNESIUM OXIDE 400 MG ORAL TABLET 400 MILLIGRAM(S): 241.3 TABLET ORAL at 17:24

## 2021-01-01 RX ADMIN — Medication 25 MILLIGRAM(S): at 05:04

## 2021-01-01 RX ADMIN — PANTOPRAZOLE SODIUM 40 MILLIGRAM(S): 20 TABLET, DELAYED RELEASE ORAL at 06:05

## 2021-01-01 RX ADMIN — HYDROMORPHONE HYDROCHLORIDE 1 MILLIGRAM(S): 2 INJECTION INTRAMUSCULAR; INTRAVENOUS; SUBCUTANEOUS at 07:14

## 2021-01-01 RX ADMIN — Medication 125 MILLIGRAM(S): at 17:37

## 2021-01-01 RX ADMIN — Medication 125 MILLIGRAM(S): at 17:12

## 2021-01-01 RX ADMIN — Medication 15 MILLIGRAM(S): at 12:41

## 2021-01-01 RX ADMIN — Medication 10 MILLIGRAM(S): at 05:51

## 2021-01-01 RX ADMIN — Medication 20 MILLIGRAM(S): at 08:52

## 2021-01-01 RX ADMIN — Medication 100 MILLIGRAM(S): at 05:43

## 2021-01-01 RX ADMIN — Medication 500 MILLIGRAM(S): at 18:43

## 2021-01-01 RX ADMIN — Medication 50 MILLIGRAM(S): at 14:15

## 2021-01-01 RX ADMIN — CEFEPIME 100 MILLIGRAM(S): 1 INJECTION, POWDER, FOR SOLUTION INTRAMUSCULAR; INTRAVENOUS at 14:08

## 2021-01-01 RX ADMIN — Medication 125 MILLIGRAM(S): at 17:43

## 2021-01-01 RX ADMIN — PANTOPRAZOLE SODIUM 40 MILLIGRAM(S): 20 TABLET, DELAYED RELEASE ORAL at 05:08

## 2021-01-01 RX ADMIN — MIRTAZAPINE 7.5 MILLIGRAM(S): 45 TABLET, ORALLY DISINTEGRATING ORAL at 11:09

## 2021-01-01 RX ADMIN — KETAMINE HYDROCHLORIDE 3.93 MG/KG/HR: 100 INJECTION INTRAMUSCULAR; INTRAVENOUS at 09:07

## 2021-01-01 RX ADMIN — Medication 125 MILLILITER(S): at 16:30

## 2021-01-01 RX ADMIN — Medication 3 MILLILITER(S): at 05:17

## 2021-01-01 RX ADMIN — Medication 2: at 05:24

## 2021-01-01 RX ADMIN — Medication 25 MILLIGRAM(S): at 21:26

## 2021-01-01 RX ADMIN — OCTREOTIDE ACETATE 50 MICROGRAM(S): 200 INJECTION, SOLUTION INTRAVENOUS; SUBCUTANEOUS at 10:03

## 2021-01-01 RX ADMIN — Medication 4 MILLIGRAM(S): at 05:48

## 2021-01-01 RX ADMIN — Medication 100 MILLIGRAM(S): at 12:50

## 2021-01-01 RX ADMIN — MIRTAZAPINE 7.5 MILLIGRAM(S): 45 TABLET, ORALLY DISINTEGRATING ORAL at 22:37

## 2021-01-01 RX ADMIN — SERTRALINE 100 MILLIGRAM(S): 25 TABLET, FILM COATED ORAL at 11:55

## 2021-01-01 RX ADMIN — HYDROMORPHONE HYDROCHLORIDE 0.5 MILLIGRAM(S): 2 INJECTION INTRAMUSCULAR; INTRAVENOUS; SUBCUTANEOUS at 11:45

## 2021-01-01 RX ADMIN — NICARDIPINE HYDROCHLORIDE 15 MG/HR: 30 CAPSULE, EXTENDED RELEASE ORAL at 10:41

## 2021-01-01 RX ADMIN — HEPARIN SODIUM 5000 UNIT(S): 5000 INJECTION INTRAVENOUS; SUBCUTANEOUS at 13:17

## 2021-01-01 RX ADMIN — PANTOPRAZOLE SODIUM 40 MILLIGRAM(S): 20 TABLET, DELAYED RELEASE ORAL at 16:32

## 2021-01-01 RX ADMIN — Medication 10 MILLIGRAM(S): at 05:01

## 2021-01-01 RX ADMIN — Medication 125 MILLIGRAM(S): at 05:52

## 2021-01-01 RX ADMIN — CEFEPIME 100 MILLIGRAM(S): 1 INJECTION, POWDER, FOR SOLUTION INTRAMUSCULAR; INTRAVENOUS at 14:12

## 2021-01-01 RX ADMIN — Medication 1 TABLET(S): at 12:37

## 2021-01-01 RX ADMIN — HUMAN INSULIN 4 UNIT(S): 100 INJECTION, SUSPENSION SUBCUTANEOUS at 01:21

## 2021-01-01 RX ADMIN — Medication 50 GRAM(S): at 01:52

## 2021-01-01 RX ADMIN — MAGNESIUM OXIDE 400 MG ORAL TABLET 400 MILLIGRAM(S): 241.3 TABLET ORAL at 18:09

## 2021-01-01 RX ADMIN — Medication 125 MILLIGRAM(S): at 16:23

## 2021-01-01 RX ADMIN — SCOPALAMINE 1 PATCH: 1 PATCH, EXTENDED RELEASE TRANSDERMAL at 20:17

## 2021-01-01 RX ADMIN — PANTOPRAZOLE SODIUM 40 MILLIGRAM(S): 20 TABLET, DELAYED RELEASE ORAL at 12:07

## 2021-01-01 RX ADMIN — SODIUM CHLORIDE 3 MILLILITER(S): 9 INJECTION INTRAMUSCULAR; INTRAVENOUS; SUBCUTANEOUS at 21:37

## 2021-01-01 RX ADMIN — Medication 100 MILLIGRAM(S): at 12:48

## 2021-01-01 RX ADMIN — Medication 250 MILLIGRAM(S): at 06:21

## 2021-01-01 RX ADMIN — Medication 25 MILLIGRAM(S): at 05:10

## 2021-01-01 RX ADMIN — Medication 0.5 MILLIGRAM(S): at 14:06

## 2021-01-01 RX ADMIN — Medication 250 MILLIGRAM(S): at 05:37

## 2021-01-01 RX ADMIN — Medication 10 MILLIGRAM(S): at 14:02

## 2021-01-01 RX ADMIN — CEFEPIME 100 MILLIGRAM(S): 1 INJECTION, POWDER, FOR SOLUTION INTRAMUSCULAR; INTRAVENOUS at 05:35

## 2021-01-01 RX ADMIN — WARFARIN SODIUM 7.5 MILLIGRAM(S): 2.5 TABLET ORAL at 21:08

## 2021-01-01 RX ADMIN — INSULIN HUMAN 3 UNIT(S)/HR: 100 INJECTION, SOLUTION SUBCUTANEOUS at 20:50

## 2021-01-01 RX ADMIN — Medication 2: at 00:11

## 2021-01-01 RX ADMIN — HUMAN INSULIN 6 UNIT(S): 100 INJECTION, SUSPENSION SUBCUTANEOUS at 17:31

## 2021-01-01 RX ADMIN — Medication 500 MICROGRAM(S): at 05:34

## 2021-01-01 RX ADMIN — Medication 40 MILLIGRAM(S): at 05:03

## 2021-01-01 RX ADMIN — Medication 100 MILLIGRAM(S): at 05:01

## 2021-01-01 RX ADMIN — Medication 10 MILLIGRAM(S): at 20:45

## 2021-01-01 RX ADMIN — Medication 250 MILLIGRAM(S): at 18:14

## 2021-01-01 RX ADMIN — Medication 1 TABLET(S): at 17:23

## 2021-01-01 RX ADMIN — DEXMEDETOMIDINE HYDROCHLORIDE IN 0.9% SODIUM CHLORIDE 9.81 MICROGRAM(S)/KG/HR: 4 INJECTION INTRAVENOUS at 07:14

## 2021-01-01 RX ADMIN — CHLORHEXIDINE GLUCONATE 1 APPLICATION(S): 213 SOLUTION TOPICAL at 05:14

## 2021-01-01 RX ADMIN — Medication 125 MILLIGRAM(S): at 17:13

## 2021-01-01 RX ADMIN — OCTREOTIDE ACETATE 50 MICROGRAM(S): 200 INJECTION, SOLUTION INTRAVENOUS; SUBCUTANEOUS at 05:52

## 2021-01-01 RX ADMIN — MIRTAZAPINE 7.5 MILLIGRAM(S): 45 TABLET, ORALLY DISINTEGRATING ORAL at 11:45

## 2021-01-01 RX ADMIN — ENOXAPARIN SODIUM 40 MILLIGRAM(S): 100 INJECTION SUBCUTANEOUS at 17:06

## 2021-01-01 RX ADMIN — Medication 125 MILLIGRAM(S): at 05:34

## 2021-01-01 RX ADMIN — Medication 10 MILLIGRAM(S): at 13:21

## 2021-01-01 RX ADMIN — MAGNESIUM OXIDE 400 MG ORAL TABLET 400 MILLIGRAM(S): 241.3 TABLET ORAL at 06:36

## 2021-01-01 RX ADMIN — ENOXAPARIN SODIUM 40 MILLIGRAM(S): 100 INJECTION SUBCUTANEOUS at 18:33

## 2021-01-01 RX ADMIN — HEPARIN SODIUM 5000 UNIT(S): 5000 INJECTION INTRAVENOUS; SUBCUTANEOUS at 14:05

## 2021-01-01 RX ADMIN — MEROPENEM 100 MILLIGRAM(S): 1 INJECTION INTRAVENOUS at 21:11

## 2021-01-01 RX ADMIN — ONDANSETRON 4 MILLIGRAM(S): 8 TABLET, FILM COATED ORAL at 16:36

## 2021-01-01 RX ADMIN — Medication 81 MILLIGRAM(S): at 12:47

## 2021-01-01 RX ADMIN — Medication 500 MICROGRAM(S): at 11:48

## 2021-01-01 RX ADMIN — Medication 4: at 17:28

## 2021-01-01 RX ADMIN — PANTOPRAZOLE SODIUM 40 MILLIGRAM(S): 20 TABLET, DELAYED RELEASE ORAL at 18:09

## 2021-01-01 RX ADMIN — Medication 10 MILLIGRAM(S): at 21:28

## 2021-01-01 RX ADMIN — PANTOPRAZOLE SODIUM 40 MILLIGRAM(S): 20 TABLET, DELAYED RELEASE ORAL at 05:38

## 2021-01-01 RX ADMIN — VASOPRESSIN 2.4 UNIT(S)/MIN: 20 INJECTION INTRAVENOUS at 22:53

## 2021-01-01 RX ADMIN — Medication 25 MILLIGRAM(S): at 21:20

## 2021-01-01 RX ADMIN — Medication 1 TABLET(S): at 11:21

## 2021-01-01 RX ADMIN — PANTOPRAZOLE SODIUM 40 MILLIGRAM(S): 20 TABLET, DELAYED RELEASE ORAL at 05:19

## 2021-01-01 RX ADMIN — Medication 125 MILLIGRAM(S): at 11:16

## 2021-01-01 RX ADMIN — Medication 125 MILLIGRAM(S): at 17:15

## 2021-01-01 RX ADMIN — VECURONIUM BROMIDE 5 MILLIGRAM(S): 20 INJECTION, POWDER, FOR SOLUTION INTRAVENOUS at 06:57

## 2021-01-01 RX ADMIN — CHLORHEXIDINE GLUCONATE 15 MILLILITER(S): 213 SOLUTION TOPICAL at 05:16

## 2021-01-01 RX ADMIN — CHLORHEXIDINE GLUCONATE 15 MILLILITER(S): 213 SOLUTION TOPICAL at 05:18

## 2021-01-01 RX ADMIN — Medication 5 MILLIGRAM(S): at 15:08

## 2021-01-01 RX ADMIN — SERTRALINE 100 MILLIGRAM(S): 25 TABLET, FILM COATED ORAL at 11:28

## 2021-01-01 RX ADMIN — Medication 81 MILLIGRAM(S): at 11:19

## 2021-01-01 RX ADMIN — AMIODARONE HYDROCHLORIDE 200 MILLIGRAM(S): 400 TABLET ORAL at 06:59

## 2021-01-01 RX ADMIN — WARFARIN SODIUM 5 MILLIGRAM(S): 2.5 TABLET ORAL at 21:18

## 2021-01-01 RX ADMIN — Medication 10 MILLIGRAM(S): at 21:44

## 2021-01-01 RX ADMIN — PANTOPRAZOLE SODIUM 40 MILLIGRAM(S): 20 TABLET, DELAYED RELEASE ORAL at 06:20

## 2021-01-01 RX ADMIN — Medication 50 MILLIEQUIVALENT(S): at 12:00

## 2021-01-01 RX ADMIN — Medication 125 MILLIGRAM(S): at 00:33

## 2021-01-01 RX ADMIN — HYDROMORPHONE HYDROCHLORIDE 0.5 MILLIGRAM(S): 2 INJECTION INTRAMUSCULAR; INTRAVENOUS; SUBCUTANEOUS at 22:00

## 2021-01-01 RX ADMIN — Medication 10 MILLIGRAM(S): at 22:24

## 2021-01-01 RX ADMIN — CHLORHEXIDINE GLUCONATE 1 APPLICATION(S): 213 SOLUTION TOPICAL at 05:16

## 2021-01-01 RX ADMIN — Medication 125 MILLIGRAM(S): at 23:08

## 2021-01-01 RX ADMIN — Medication 100 MILLIGRAM(S): at 13:03

## 2021-01-01 RX ADMIN — SODIUM CHLORIDE 10 MILLILITER(S): 9 INJECTION INTRAMUSCULAR; INTRAVENOUS; SUBCUTANEOUS at 01:15

## 2021-01-01 RX ADMIN — Medication 62.5 MILLIMOLE(S): at 02:20

## 2021-01-01 RX ADMIN — MIRTAZAPINE 7.5 MILLIGRAM(S): 45 TABLET, ORALLY DISINTEGRATING ORAL at 12:59

## 2021-01-01 RX ADMIN — Medication 50 MILLIEQUIVALENT(S): at 05:40

## 2021-01-01 RX ADMIN — CEFEPIME 100 MILLIGRAM(S): 1 INJECTION, POWDER, FOR SOLUTION INTRAMUSCULAR; INTRAVENOUS at 05:18

## 2021-01-01 RX ADMIN — Medication 0.5 MILLIGRAM(S): at 22:03

## 2021-01-01 RX ADMIN — SODIUM CHLORIDE 3 MILLILITER(S): 9 INJECTION INTRAMUSCULAR; INTRAVENOUS; SUBCUTANEOUS at 05:57

## 2021-01-01 RX ADMIN — PANTOPRAZOLE SODIUM 40 MILLIGRAM(S): 20 TABLET, DELAYED RELEASE ORAL at 05:17

## 2021-01-01 RX ADMIN — PANTOPRAZOLE SODIUM 40 MILLIGRAM(S): 20 TABLET, DELAYED RELEASE ORAL at 05:51

## 2021-01-01 RX ADMIN — Medication 1: at 12:27

## 2021-01-01 RX ADMIN — DEXMEDETOMIDINE HYDROCHLORIDE IN 0.9% SODIUM CHLORIDE 9.81 MICROGRAM(S)/KG/HR: 4 INJECTION INTRAVENOUS at 14:10

## 2021-01-01 RX ADMIN — Medication 3 MILLIGRAM(S): at 06:40

## 2021-01-01 RX ADMIN — POLYETHYLENE GLYCOL 3350 17 GRAM(S): 17 POWDER, FOR SOLUTION ORAL at 13:03

## 2021-01-01 RX ADMIN — CEFEPIME 100 MILLIGRAM(S): 1 INJECTION, POWDER, FOR SOLUTION INTRAMUSCULAR; INTRAVENOUS at 21:31

## 2021-01-01 RX ADMIN — Medication 50 MILLIGRAM(S): at 13:48

## 2021-01-01 RX ADMIN — Medication 25 MILLIGRAM(S): at 21:02

## 2021-01-01 RX ADMIN — Medication 125 MILLIGRAM(S): at 05:10

## 2021-01-01 RX ADMIN — AMIODARONE HYDROCHLORIDE 200 MILLIGRAM(S): 400 TABLET ORAL at 06:34

## 2021-01-01 RX ADMIN — MAGNESIUM OXIDE 400 MG ORAL TABLET 400 MILLIGRAM(S): 241.3 TABLET ORAL at 19:10

## 2021-01-01 RX ADMIN — Medication 650 MILLIGRAM(S): at 13:20

## 2021-01-01 RX ADMIN — CHLORHEXIDINE GLUCONATE 15 MILLILITER(S): 213 SOLUTION TOPICAL at 21:58

## 2021-01-01 RX ADMIN — MEROPENEM 100 MILLIGRAM(S): 1 INJECTION INTRAVENOUS at 14:16

## 2021-01-01 RX ADMIN — Medication 500 MILLIGRAM(S): at 17:41

## 2021-01-01 RX ADMIN — Medication 500 MILLIGRAM(S): at 11:51

## 2021-01-01 RX ADMIN — Medication 40 MILLIGRAM(S): at 05:15

## 2021-01-01 RX ADMIN — Medication 4: at 17:42

## 2021-01-01 RX ADMIN — Medication 100 MILLIGRAM(S): at 13:01

## 2021-01-01 RX ADMIN — HEPARIN SODIUM 5000 UNIT(S): 5000 INJECTION INTRAVENOUS; SUBCUTANEOUS at 05:52

## 2021-01-01 RX ADMIN — Medication 100 MILLIGRAM(S): at 14:00

## 2021-01-01 RX ADMIN — Medication 10 MILLIGRAM(S): at 21:23

## 2021-01-01 RX ADMIN — PANTOPRAZOLE SODIUM 40 MILLIGRAM(S): 20 TABLET, DELAYED RELEASE ORAL at 17:55

## 2021-01-01 RX ADMIN — MEROPENEM 100 MILLIGRAM(S): 1 INJECTION INTRAVENOUS at 05:22

## 2021-01-01 RX ADMIN — Medication 650 MILLIGRAM(S): at 08:15

## 2021-01-01 RX ADMIN — CHLORHEXIDINE GLUCONATE 15 MILLILITER(S): 213 SOLUTION TOPICAL at 05:08

## 2021-01-01 RX ADMIN — HEPARIN SODIUM 5000 UNIT(S): 5000 INJECTION INTRAVENOUS; SUBCUTANEOUS at 05:17

## 2021-01-01 RX ADMIN — Medication 500 MILLIGRAM(S): at 05:56

## 2021-01-01 RX ADMIN — CHLORHEXIDINE GLUCONATE 15 MILLILITER(S): 213 SOLUTION TOPICAL at 18:22

## 2021-01-01 RX ADMIN — CHLORHEXIDINE GLUCONATE 15 MILLILITER(S): 213 SOLUTION TOPICAL at 05:02

## 2021-01-01 RX ADMIN — Medication 50 MILLIEQUIVALENT(S): at 01:17

## 2021-01-01 RX ADMIN — Medication 10 MILLIGRAM(S): at 05:16

## 2021-01-01 RX ADMIN — CEFEPIME 100 MILLIGRAM(S): 1 INJECTION, POWDER, FOR SOLUTION INTRAMUSCULAR; INTRAVENOUS at 21:33

## 2021-01-01 RX ADMIN — Medication 500 MICROGRAM(S): at 17:37

## 2021-01-01 RX ADMIN — Medication 25 MILLIGRAM(S): at 22:31

## 2021-01-01 RX ADMIN — AMIODARONE HYDROCHLORIDE 200 MILLIGRAM(S): 400 TABLET ORAL at 05:02

## 2021-01-01 RX ADMIN — Medication 40 MILLIEQUIVALENT(S): at 00:45

## 2021-01-01 RX ADMIN — CEFEPIME 100 MILLIGRAM(S): 1 INJECTION, POWDER, FOR SOLUTION INTRAMUSCULAR; INTRAVENOUS at 13:09

## 2021-01-01 RX ADMIN — Medication 25 MILLIGRAM(S): at 06:34

## 2021-01-01 RX ADMIN — HUMAN INSULIN 6 UNIT(S): 100 INJECTION, SUSPENSION SUBCUTANEOUS at 17:39

## 2021-01-01 RX ADMIN — PIPERACILLIN AND TAZOBACTAM 25 GRAM(S): 4; .5 INJECTION, POWDER, LYOPHILIZED, FOR SOLUTION INTRAVENOUS at 08:29

## 2021-01-01 RX ADMIN — Medication 650 MILLIGRAM(S): at 18:05

## 2021-01-01 RX ADMIN — Medication 100 MILLIGRAM(S): at 21:42

## 2021-01-01 RX ADMIN — Medication 20 MILLIGRAM(S): at 09:39

## 2021-01-01 RX ADMIN — PROPOFOL 9.42 MICROGRAM(S)/KG/MIN: 10 INJECTION, EMULSION INTRAVENOUS at 09:44

## 2021-01-01 RX ADMIN — Medication 10 MILLIGRAM(S): at 22:12

## 2021-01-01 RX ADMIN — Medication 650 MILLIGRAM(S): at 11:32

## 2021-01-01 RX ADMIN — Medication 500 MICROGRAM(S): at 17:23

## 2021-01-01 RX ADMIN — SERTRALINE 50 MILLIGRAM(S): 25 TABLET, FILM COATED ORAL at 12:16

## 2021-01-01 RX ADMIN — Medication 50 MILLIEQUIVALENT(S): at 10:14

## 2021-01-01 RX ADMIN — Medication 125 MILLIGRAM(S): at 17:25

## 2021-01-01 RX ADMIN — Medication 125 MILLIGRAM(S): at 17:39

## 2021-01-01 RX ADMIN — MIRTAZAPINE 7.5 MILLIGRAM(S): 45 TABLET, ORALLY DISINTEGRATING ORAL at 11:59

## 2021-01-01 RX ADMIN — SODIUM CHLORIDE 3 MILLILITER(S): 9 INJECTION INTRAMUSCULAR; INTRAVENOUS; SUBCUTANEOUS at 05:00

## 2021-01-01 RX ADMIN — PROPOFOL 9.42 MICROGRAM(S)/KG/MIN: 10 INJECTION, EMULSION INTRAVENOUS at 20:50

## 2021-01-01 RX ADMIN — MEROPENEM 100 MILLIGRAM(S): 1 INJECTION INTRAVENOUS at 03:54

## 2021-01-01 RX ADMIN — Medication 20 MILLIEQUIVALENT(S): at 21:03

## 2021-01-01 RX ADMIN — ONDANSETRON 8 MILLIGRAM(S): 8 TABLET, FILM COATED ORAL at 05:36

## 2021-01-01 RX ADMIN — Medication 650 MILLIGRAM(S): at 23:03

## 2021-01-01 RX ADMIN — Medication 650 MILLIGRAM(S): at 15:57

## 2021-01-01 RX ADMIN — Medication 12.5 GRAM(S): at 14:10

## 2021-01-01 RX ADMIN — Medication 650 MILLIGRAM(S): at 22:39

## 2021-01-01 RX ADMIN — Medication 10 MILLIGRAM(S): at 13:31

## 2021-01-01 RX ADMIN — HYDROMORPHONE HYDROCHLORIDE 0.5 MILLIGRAM(S): 2 INJECTION INTRAMUSCULAR; INTRAVENOUS; SUBCUTANEOUS at 21:40

## 2021-01-01 RX ADMIN — Medication 125 MILLIGRAM(S): at 17:56

## 2021-01-01 RX ADMIN — Medication 5 MILLIGRAM(S): at 22:14

## 2021-01-01 RX ADMIN — CEFEPIME 100 MILLIGRAM(S): 1 INJECTION, POWDER, FOR SOLUTION INTRAMUSCULAR; INTRAVENOUS at 17:18

## 2021-01-01 RX ADMIN — PANTOPRAZOLE SODIUM 40 MILLIGRAM(S): 20 TABLET, DELAYED RELEASE ORAL at 17:48

## 2021-01-01 RX ADMIN — Medication 125 MILLIGRAM(S): at 18:19

## 2021-01-01 RX ADMIN — Medication 1 TABLET(S): at 17:12

## 2021-01-01 RX ADMIN — Medication 25 MILLIGRAM(S): at 06:03

## 2021-01-01 RX ADMIN — Medication 6: at 06:45

## 2021-01-01 RX ADMIN — Medication 81 MILLIGRAM(S): at 14:59

## 2021-01-01 RX ADMIN — Medication 50 GRAM(S): at 02:13

## 2021-01-01 RX ADMIN — ONDANSETRON 4 MILLIGRAM(S): 8 TABLET, FILM COATED ORAL at 22:09

## 2021-01-01 RX ADMIN — Medication 10 MILLIGRAM(S): at 06:07

## 2021-01-01 RX ADMIN — ONDANSETRON 4 MILLIGRAM(S): 8 TABLET, FILM COATED ORAL at 19:15

## 2021-01-01 RX ADMIN — CHLORHEXIDINE GLUCONATE 15 MILLILITER(S): 213 SOLUTION TOPICAL at 18:01

## 2021-01-01 RX ADMIN — DEXMEDETOMIDINE HYDROCHLORIDE IN 0.9% SODIUM CHLORIDE 9.81 MICROGRAM(S)/KG/HR: 4 INJECTION INTRAVENOUS at 10:13

## 2021-01-01 RX ADMIN — Medication 50 GRAM(S): at 02:40

## 2021-01-01 RX ADMIN — Medication 500 MILLIGRAM(S): at 06:13

## 2021-01-01 RX ADMIN — Medication 10 MILLIGRAM(S): at 13:36

## 2021-01-01 RX ADMIN — Medication 50 MILLIEQUIVALENT(S): at 02:00

## 2021-01-01 RX ADMIN — MIRTAZAPINE 7.5 MILLIGRAM(S): 45 TABLET, ORALLY DISINTEGRATING ORAL at 21:23

## 2021-01-01 RX ADMIN — ONDANSETRON 8 MILLIGRAM(S): 8 TABLET, FILM COATED ORAL at 21:42

## 2021-01-01 RX ADMIN — ONDANSETRON 8 MILLIGRAM(S): 8 TABLET, FILM COATED ORAL at 21:55

## 2021-01-01 RX ADMIN — CHLORHEXIDINE GLUCONATE 1 APPLICATION(S): 213 SOLUTION TOPICAL at 05:08

## 2021-01-01 RX ADMIN — Medication 125 MILLIGRAM(S): at 06:30

## 2021-01-01 RX ADMIN — Medication 125 MILLILITER(S): at 10:50

## 2021-01-01 RX ADMIN — PANTOPRAZOLE SODIUM 40 MILLIGRAM(S): 20 TABLET, DELAYED RELEASE ORAL at 05:26

## 2021-01-01 RX ADMIN — Medication 250 MILLIGRAM(S): at 18:15

## 2021-01-01 RX ADMIN — Medication 25 MILLIGRAM(S): at 13:34

## 2021-01-01 RX ADMIN — Medication 125 MILLIGRAM(S): at 17:10

## 2021-01-01 RX ADMIN — Medication 125 MILLIGRAM(S): at 15:27

## 2021-01-01 RX ADMIN — Medication 1 MILLIGRAM(S): at 21:34

## 2021-01-01 RX ADMIN — Medication 3: at 11:54

## 2021-01-01 RX ADMIN — Medication 2.5 MILLIGRAM(S): at 12:58

## 2021-01-01 RX ADMIN — Medication 10 MILLIGRAM(S): at 23:00

## 2021-01-01 RX ADMIN — PIPERACILLIN AND TAZOBACTAM 25 GRAM(S): 4; .5 INJECTION, POWDER, LYOPHILIZED, FOR SOLUTION INTRAVENOUS at 09:05

## 2021-01-01 RX ADMIN — SCOPALAMINE 1 PATCH: 1 PATCH, EXTENDED RELEASE TRANSDERMAL at 07:00

## 2021-01-01 RX ADMIN — MIRTAZAPINE 7.5 MILLIGRAM(S): 45 TABLET, ORALLY DISINTEGRATING ORAL at 11:50

## 2021-01-01 RX ADMIN — Medication 100 MILLIGRAM(S): at 12:37

## 2021-01-01 RX ADMIN — HUMAN INSULIN 4 UNIT(S): 100 INJECTION, SUSPENSION SUBCUTANEOUS at 11:43

## 2021-01-01 RX ADMIN — HUMAN INSULIN 10 UNIT(S): 100 INJECTION, SUSPENSION SUBCUTANEOUS at 12:06

## 2021-01-01 RX ADMIN — Medication 125 MILLIGRAM(S): at 17:02

## 2021-01-01 RX ADMIN — Medication 100 MILLIGRAM(S): at 05:18

## 2021-01-01 RX ADMIN — MIRTAZAPINE 7.5 MILLIGRAM(S): 45 TABLET, ORALLY DISINTEGRATING ORAL at 22:13

## 2021-01-01 RX ADMIN — Medication 100 MILLIEQUIVALENT(S): at 04:00

## 2021-01-01 RX ADMIN — Medication 1 TABLET(S): at 13:20

## 2021-01-01 RX ADMIN — VECURONIUM BROMIDE 10 MILLIGRAM(S): 20 INJECTION, POWDER, FOR SOLUTION INTRAVENOUS at 18:18

## 2021-01-01 RX ADMIN — Medication 5 MILLIGRAM(S): at 12:44

## 2021-01-01 RX ADMIN — Medication 125 MILLIGRAM(S): at 05:11

## 2021-01-01 RX ADMIN — Medication 50 MILLIGRAM(S): at 06:06

## 2021-01-01 RX ADMIN — Medication 650 MILLIGRAM(S): at 13:05

## 2021-01-01 RX ADMIN — Medication 50 MILLILITER(S): at 16:02

## 2021-01-01 RX ADMIN — Medication 125 MILLIGRAM(S): at 13:08

## 2021-01-01 RX ADMIN — ONDANSETRON 4 MILLIGRAM(S): 8 TABLET, FILM COATED ORAL at 17:32

## 2021-01-01 RX ADMIN — Medication 125 MILLIGRAM(S): at 18:01

## 2021-01-01 RX ADMIN — ONDANSETRON 8 MILLIGRAM(S): 8 TABLET, FILM COATED ORAL at 21:10

## 2021-01-01 RX ADMIN — LIDOCAINE 1 PATCH: 4 CREAM TOPICAL at 01:54

## 2021-01-01 RX ADMIN — Medication 650 MILLIGRAM(S): at 14:49

## 2021-01-01 RX ADMIN — Medication 25 MILLIGRAM(S): at 05:08

## 2021-01-01 RX ADMIN — Medication 500 MILLIGRAM(S): at 17:42

## 2021-01-01 RX ADMIN — MIRTAZAPINE 7.5 MILLIGRAM(S): 45 TABLET, ORALLY DISINTEGRATING ORAL at 22:12

## 2021-01-01 RX ADMIN — PANTOPRAZOLE SODIUM 40 MILLIGRAM(S): 20 TABLET, DELAYED RELEASE ORAL at 18:24

## 2021-01-01 RX ADMIN — Medication 50 MILLIEQUIVALENT(S): at 00:46

## 2021-01-01 RX ADMIN — Medication 1 PACKET(S): at 12:29

## 2021-01-01 RX ADMIN — CLOPIDOGREL BISULFATE 75 MILLIGRAM(S): 75 TABLET, FILM COATED ORAL at 12:40

## 2021-01-01 RX ADMIN — Medication 1 MILLIGRAM(S): at 14:33

## 2021-01-01 RX ADMIN — CHLORHEXIDINE GLUCONATE 1 APPLICATION(S): 213 SOLUTION TOPICAL at 05:18

## 2021-01-01 RX ADMIN — CHLORHEXIDINE GLUCONATE 1 APPLICATION(S): 213 SOLUTION TOPICAL at 05:49

## 2021-01-01 RX ADMIN — Medication 250 MILLIGRAM(S): at 03:24

## 2021-01-01 RX ADMIN — Medication 50 GRAM(S): at 06:45

## 2021-01-01 RX ADMIN — PROPOFOL 9.42 MICROGRAM(S)/KG/MIN: 10 INJECTION, EMULSION INTRAVENOUS at 19:58

## 2021-01-01 RX ADMIN — FENTANYL CITRATE 12 MICROGRAM(S): 50 INJECTION INTRAVENOUS at 16:20

## 2021-01-01 RX ADMIN — HEPARIN SODIUM 5000 UNIT(S): 5000 INJECTION INTRAVENOUS; SUBCUTANEOUS at 13:06

## 2021-01-01 RX ADMIN — DEXMEDETOMIDINE HYDROCHLORIDE IN 0.9% SODIUM CHLORIDE 9.81 MICROGRAM(S)/KG/HR: 4 INJECTION INTRAVENOUS at 00:33

## 2021-01-01 RX ADMIN — AMIODARONE HYDROCHLORIDE 200 MILLIGRAM(S): 400 TABLET ORAL at 05:52

## 2021-01-01 RX ADMIN — Medication 100 GRAM(S): at 04:05

## 2021-01-01 RX ADMIN — Medication 10 MILLIGRAM(S): at 14:01

## 2021-01-01 RX ADMIN — Medication 10 MILLIGRAM(S): at 12:41

## 2021-01-01 RX ADMIN — Medication 10 MILLIGRAM(S): at 13:23

## 2021-01-01 RX ADMIN — Medication 125 MILLILITER(S): at 13:21

## 2021-01-01 RX ADMIN — ENOXAPARIN SODIUM 30 MILLIGRAM(S): 100 INJECTION SUBCUTANEOUS at 13:22

## 2021-01-01 RX ADMIN — CEFEPIME 100 MILLIGRAM(S): 1 INJECTION, POWDER, FOR SOLUTION INTRAMUSCULAR; INTRAVENOUS at 03:21

## 2021-01-01 RX ADMIN — Medication 62.5 MILLIMOLE(S): at 17:04

## 2021-01-01 RX ADMIN — PANTOPRAZOLE SODIUM 40 MILLIGRAM(S): 20 TABLET, DELAYED RELEASE ORAL at 05:03

## 2021-01-01 RX ADMIN — Medication 0.5 MILLIGRAM(S): at 22:30

## 2021-01-01 RX ADMIN — ONDANSETRON 8 MILLIGRAM(S): 8 TABLET, FILM COATED ORAL at 05:20

## 2021-01-01 RX ADMIN — Medication 125 MILLIGRAM(S): at 17:30

## 2021-01-01 RX ADMIN — MIRTAZAPINE 7.5 MILLIGRAM(S): 45 TABLET, ORALLY DISINTEGRATING ORAL at 22:05

## 2021-01-01 RX ADMIN — SCOPALAMINE 1 PATCH: 1 PATCH, EXTENDED RELEASE TRANSDERMAL at 21:22

## 2021-01-01 RX ADMIN — SODIUM CHLORIDE 10 MILLILITER(S): 9 INJECTION INTRAMUSCULAR; INTRAVENOUS; SUBCUTANEOUS at 16:47

## 2021-01-01 RX ADMIN — MIRTAZAPINE 7.5 MILLIGRAM(S): 45 TABLET, ORALLY DISINTEGRATING ORAL at 12:33

## 2021-01-01 RX ADMIN — WARFARIN SODIUM 5 MILLIGRAM(S): 2.5 TABLET ORAL at 21:42

## 2021-01-01 RX ADMIN — PANTOPRAZOLE SODIUM 40 MILLIGRAM(S): 20 TABLET, DELAYED RELEASE ORAL at 17:43

## 2021-01-01 RX ADMIN — Medication 20 MILLIEQUIVALENT(S): at 01:44

## 2021-01-01 RX ADMIN — WARFARIN SODIUM 3 MILLIGRAM(S): 2.5 TABLET ORAL at 22:42

## 2021-01-01 RX ADMIN — Medication 50 MILLIGRAM(S): at 13:32

## 2021-01-01 RX ADMIN — Medication 10 MILLIGRAM(S): at 13:03

## 2021-01-01 RX ADMIN — MEROPENEM 100 MILLIGRAM(S): 1 INJECTION INTRAVENOUS at 13:08

## 2021-01-01 RX ADMIN — Medication 40 MILLIEQUIVALENT(S): at 19:48

## 2021-01-01 RX ADMIN — HEPARIN SODIUM 10 UNIT(S)/HR: 5000 INJECTION INTRAVENOUS; SUBCUTANEOUS at 21:04

## 2021-01-01 RX ADMIN — Medication 10 MILLIGRAM(S): at 22:36

## 2021-01-01 RX ADMIN — AMIODARONE HYDROCHLORIDE 600 MILLIGRAM(S): 400 TABLET ORAL at 15:31

## 2021-01-01 RX ADMIN — Medication 50 MILLIGRAM(S): at 18:30

## 2021-01-01 RX ADMIN — Medication 100 MILLIGRAM(S): at 13:10

## 2021-01-01 RX ADMIN — Medication 10 MILLIGRAM(S): at 13:13

## 2021-01-01 RX ADMIN — Medication 10 MILLIGRAM(S): at 11:07

## 2021-01-01 RX ADMIN — FENTANYL CITRATE 25 MICROGRAM(S): 50 INJECTION INTRAVENOUS at 18:00

## 2021-01-01 RX ADMIN — ONDANSETRON 8 MILLIGRAM(S): 8 TABLET, FILM COATED ORAL at 14:01

## 2021-01-01 RX ADMIN — CHLORHEXIDINE GLUCONATE 15 MILLILITER(S): 213 SOLUTION TOPICAL at 17:14

## 2021-01-01 RX ADMIN — FLUCONAZOLE 50 MILLIGRAM(S): 150 TABLET ORAL at 00:24

## 2021-01-01 RX ADMIN — ONDANSETRON 4 MILLIGRAM(S): 8 TABLET, FILM COATED ORAL at 02:40

## 2021-01-01 RX ADMIN — Medication 1 TABLET(S): at 13:26

## 2021-01-01 RX ADMIN — Medication 5 MILLIGRAM(S): at 05:14

## 2021-01-01 RX ADMIN — PANTOPRAZOLE SODIUM 40 MILLIGRAM(S): 20 TABLET, DELAYED RELEASE ORAL at 17:02

## 2021-01-01 RX ADMIN — Medication 50 MILLIEQUIVALENT(S): at 11:26

## 2021-01-01 RX ADMIN — Medication 10 MILLIGRAM(S): at 05:44

## 2021-01-01 RX ADMIN — CEFEPIME 100 MILLIGRAM(S): 1 INJECTION, POWDER, FOR SOLUTION INTRAMUSCULAR; INTRAVENOUS at 13:17

## 2021-01-01 RX ADMIN — Medication 10 MILLIGRAM(S): at 13:05

## 2021-01-01 RX ADMIN — Medication 10 MILLIGRAM(S): at 21:26

## 2021-01-01 RX ADMIN — CEFEPIME 100 MILLIGRAM(S): 1 INJECTION, POWDER, FOR SOLUTION INTRAMUSCULAR; INTRAVENOUS at 22:13

## 2021-01-01 RX ADMIN — SIMETHICONE 80 MILLIGRAM(S): 80 TABLET, CHEWABLE ORAL at 12:08

## 2021-01-01 RX ADMIN — ONDANSETRON 8 MILLIGRAM(S): 8 TABLET, FILM COATED ORAL at 21:06

## 2021-01-01 RX ADMIN — Medication 100 GRAM(S): at 01:52

## 2021-01-01 RX ADMIN — Medication 62.5 MILLIMOLE(S): at 00:29

## 2021-01-01 RX ADMIN — Medication 125 MILLIGRAM(S): at 23:49

## 2021-01-01 RX ADMIN — Medication 4: at 05:46

## 2021-01-01 RX ADMIN — Medication 10 MILLIGRAM(S): at 05:29

## 2021-01-01 RX ADMIN — PANTOPRAZOLE SODIUM 40 MILLIGRAM(S): 20 TABLET, DELAYED RELEASE ORAL at 12:30

## 2021-01-01 RX ADMIN — ONDANSETRON 4 MILLIGRAM(S): 8 TABLET, FILM COATED ORAL at 03:54

## 2021-01-01 RX ADMIN — Medication 10 MILLIGRAM(S): at 05:23

## 2021-01-01 RX ADMIN — Medication 10 MILLIGRAM(S): at 15:15

## 2021-01-01 RX ADMIN — ENOXAPARIN SODIUM 40 MILLIGRAM(S): 100 INJECTION SUBCUTANEOUS at 17:53

## 2021-01-01 RX ADMIN — Medication 50 MILLIGRAM(S): at 05:14

## 2021-01-01 RX ADMIN — MIRTAZAPINE 7.5 MILLIGRAM(S): 45 TABLET, ORALLY DISINTEGRATING ORAL at 21:39

## 2021-01-01 RX ADMIN — AMIODARONE HYDROCHLORIDE 200 MILLIGRAM(S): 400 TABLET ORAL at 05:01

## 2021-01-01 RX ADMIN — DEXMEDETOMIDINE HYDROCHLORIDE IN 0.9% SODIUM CHLORIDE 9.81 MICROGRAM(S)/KG/HR: 4 INJECTION INTRAVENOUS at 23:50

## 2021-01-01 RX ADMIN — SODIUM CHLORIDE 100 MILLILITER(S): 9 INJECTION INTRAMUSCULAR; INTRAVENOUS; SUBCUTANEOUS at 17:44

## 2021-01-01 RX ADMIN — Medication 10 MILLIGRAM(S): at 21:49

## 2021-01-01 RX ADMIN — Medication 10 MILLIGRAM(S): at 13:11

## 2021-01-01 RX ADMIN — Medication 125 MILLIGRAM(S): at 11:38

## 2021-01-01 RX ADMIN — CHLORHEXIDINE GLUCONATE 1 APPLICATION(S): 213 SOLUTION TOPICAL at 06:44

## 2021-01-01 RX ADMIN — CEFEPIME 100 MILLIGRAM(S): 1 INJECTION, POWDER, FOR SOLUTION INTRAMUSCULAR; INTRAVENOUS at 05:11

## 2021-01-01 RX ADMIN — Medication 100 MILLIGRAM(S): at 00:25

## 2021-01-01 RX ADMIN — FENTANYL CITRATE 100 MICROGRAM(S): 50 INJECTION INTRAVENOUS at 18:20

## 2021-01-01 RX ADMIN — Medication 5 MILLIGRAM(S): at 06:24

## 2021-01-01 RX ADMIN — CHLORHEXIDINE GLUCONATE 1 APPLICATION(S): 213 SOLUTION TOPICAL at 06:22

## 2021-01-01 RX ADMIN — SERTRALINE 100 MILLIGRAM(S): 25 TABLET, FILM COATED ORAL at 12:40

## 2021-01-01 RX ADMIN — PHENYLEPHRINE HYDROCHLORIDE 8.83 MICROGRAM(S)/KG/MIN: 10 INJECTION INTRAVENOUS at 08:47

## 2021-01-01 RX ADMIN — Medication 100 MILLIGRAM(S): at 12:04

## 2021-01-01 RX ADMIN — HEPARIN SODIUM 5000 UNIT(S): 5000 INJECTION INTRAVENOUS; SUBCUTANEOUS at 22:17

## 2021-01-01 RX ADMIN — Medication 100 MILLIGRAM(S): at 13:42

## 2021-01-01 RX ADMIN — MEROPENEM 100 MILLIGRAM(S): 1 INJECTION INTRAVENOUS at 18:30

## 2021-01-01 RX ADMIN — HYDROMORPHONE HYDROCHLORIDE 0.5 MILLIGRAM(S): 2 INJECTION INTRAMUSCULAR; INTRAVENOUS; SUBCUTANEOUS at 18:00

## 2021-01-01 RX ADMIN — Medication 40 MILLIGRAM(S): at 05:57

## 2021-01-01 RX ADMIN — CHLORHEXIDINE GLUCONATE 15 MILLILITER(S): 213 SOLUTION TOPICAL at 05:07

## 2021-01-01 RX ADMIN — HEPARIN SODIUM 11.5 UNIT(S)/HR: 5000 INJECTION INTRAVENOUS; SUBCUTANEOUS at 08:04

## 2021-01-01 RX ADMIN — CEFEPIME 100 MILLIGRAM(S): 1 INJECTION, POWDER, FOR SOLUTION INTRAMUSCULAR; INTRAVENOUS at 21:54

## 2021-01-01 RX ADMIN — Medication 10 MILLIGRAM(S): at 22:41

## 2021-01-01 RX ADMIN — CEFEPIME 100 MILLIGRAM(S): 1 INJECTION, POWDER, FOR SOLUTION INTRAMUSCULAR; INTRAVENOUS at 11:04

## 2021-01-01 RX ADMIN — SODIUM CHLORIDE 25 MILLILITER(S): 9 INJECTION, SOLUTION INTRAVENOUS at 14:13

## 2021-01-01 RX ADMIN — HEPARIN SODIUM 5000 UNIT(S): 5000 INJECTION INTRAVENOUS; SUBCUTANEOUS at 21:49

## 2021-01-01 RX ADMIN — Medication 60 MILLIGRAM(S): at 17:13

## 2021-01-01 RX ADMIN — Medication 50 MILLIGRAM(S): at 05:21

## 2021-01-01 RX ADMIN — Medication 40 MILLIEQUIVALENT(S): at 17:43

## 2021-01-01 RX ADMIN — AMIODARONE HYDROCHLORIDE 200 MILLIGRAM(S): 400 TABLET ORAL at 05:18

## 2021-01-01 RX ADMIN — Medication 25 GRAM(S): at 12:00

## 2021-01-01 RX ADMIN — Medication 125 MILLIGRAM(S): at 12:05

## 2021-01-01 RX ADMIN — HEPARIN SODIUM 12 UNIT(S)/HR: 5000 INJECTION INTRAVENOUS; SUBCUTANEOUS at 19:57

## 2021-01-01 RX ADMIN — Medication 5 MILLIGRAM(S): at 14:06

## 2021-01-01 RX ADMIN — Medication 83.33 MILLIMOLE(S): at 06:36

## 2021-01-01 RX ADMIN — Medication 200 MILLIGRAM(S): at 21:10

## 2021-01-01 RX ADMIN — Medication 81 MILLIGRAM(S): at 12:35

## 2021-01-01 RX ADMIN — Medication 650 MILLIGRAM(S): at 18:03

## 2021-01-01 RX ADMIN — CEFEPIME 100 MILLIGRAM(S): 1 INJECTION, POWDER, FOR SOLUTION INTRAMUSCULAR; INTRAVENOUS at 21:21

## 2021-01-01 RX ADMIN — Medication 125 MILLIGRAM(S): at 12:21

## 2021-01-01 RX ADMIN — Medication 10 MILLIGRAM(S): at 21:41

## 2021-01-01 RX ADMIN — DEXMEDETOMIDINE HYDROCHLORIDE IN 0.9% SODIUM CHLORIDE 9.81 MICROGRAM(S)/KG/HR: 4 INJECTION INTRAVENOUS at 11:27

## 2021-01-01 RX ADMIN — Medication 10 MILLIGRAM(S): at 21:09

## 2021-01-01 RX ADMIN — Medication 650 MILLIGRAM(S): at 08:26

## 2021-01-01 RX ADMIN — Medication 2: at 21:39

## 2021-01-01 RX ADMIN — Medication 100 MILLIGRAM(S): at 05:15

## 2021-01-01 RX ADMIN — PANTOPRAZOLE SODIUM 40 MILLIGRAM(S): 20 TABLET, DELAYED RELEASE ORAL at 17:56

## 2021-01-01 RX ADMIN — PANTOPRAZOLE SODIUM 40 MILLIGRAM(S): 20 TABLET, DELAYED RELEASE ORAL at 17:26

## 2021-01-01 RX ADMIN — CHLORHEXIDINE GLUCONATE 15 MILLILITER(S): 213 SOLUTION TOPICAL at 17:30

## 2021-01-01 RX ADMIN — Medication 50 GRAM(S): at 02:05

## 2021-01-01 RX ADMIN — Medication 100 MILLIGRAM(S): at 05:19

## 2021-01-01 RX ADMIN — CEFEPIME 100 MILLIGRAM(S): 1 INJECTION, POWDER, FOR SOLUTION INTRAMUSCULAR; INTRAVENOUS at 12:49

## 2021-01-01 RX ADMIN — CEFEPIME 100 MILLIGRAM(S): 1 INJECTION, POWDER, FOR SOLUTION INTRAMUSCULAR; INTRAVENOUS at 22:08

## 2021-01-01 RX ADMIN — Medication 125 MILLIGRAM(S): at 12:56

## 2021-01-01 RX ADMIN — SODIUM CHLORIDE 3 MILLILITER(S): 9 INJECTION INTRAMUSCULAR; INTRAVENOUS; SUBCUTANEOUS at 21:56

## 2021-01-01 RX ADMIN — PANTOPRAZOLE SODIUM 40 MILLIGRAM(S): 20 TABLET, DELAYED RELEASE ORAL at 06:16

## 2021-01-01 RX ADMIN — HEPARIN SODIUM 5000 UNIT(S): 5000 INJECTION INTRAVENOUS; SUBCUTANEOUS at 05:00

## 2021-01-01 RX ADMIN — HEPARIN SODIUM 4 UNIT(S)/HR: 5000 INJECTION INTRAVENOUS; SUBCUTANEOUS at 10:05

## 2021-01-01 RX ADMIN — Medication 0.5 MILLIGRAM(S): at 21:38

## 2021-01-01 RX ADMIN — Medication 100 MILLIGRAM(S): at 11:48

## 2021-01-01 RX ADMIN — Medication 650 MILLIGRAM(S): at 21:38

## 2021-01-01 RX ADMIN — Medication 400 MILLIGRAM(S): at 18:30

## 2021-01-01 RX ADMIN — Medication 50 MILLIEQUIVALENT(S): at 02:25

## 2021-01-01 RX ADMIN — Medication 500 MILLIGRAM(S): at 06:41

## 2021-01-01 RX ADMIN — Medication 10 MILLIGRAM(S): at 15:31

## 2021-01-01 RX ADMIN — SODIUM CHLORIDE 3 MILLILITER(S): 9 INJECTION INTRAMUSCULAR; INTRAVENOUS; SUBCUTANEOUS at 21:22

## 2021-01-01 RX ADMIN — Medication 5 MILLIGRAM(S): at 02:15

## 2021-01-01 RX ADMIN — CEFEPIME 100 MILLIGRAM(S): 1 INJECTION, POWDER, FOR SOLUTION INTRAMUSCULAR; INTRAVENOUS at 21:03

## 2021-01-01 RX ADMIN — Medication 125 MILLIGRAM(S): at 05:33

## 2021-01-01 RX ADMIN — PANTOPRAZOLE SODIUM 40 MILLIGRAM(S): 20 TABLET, DELAYED RELEASE ORAL at 05:09

## 2021-01-01 RX ADMIN — Medication 25 GRAM(S): at 08:23

## 2021-01-01 RX ADMIN — Medication 62.5 MILLIMOLE(S): at 04:04

## 2021-01-01 RX ADMIN — HYDROMORPHONE HYDROCHLORIDE 0.25 MILLIGRAM(S): 2 INJECTION INTRAMUSCULAR; INTRAVENOUS; SUBCUTANEOUS at 08:45

## 2021-01-01 RX ADMIN — Medication 10 MILLIGRAM(S): at 05:04

## 2021-01-01 RX ADMIN — Medication 500 MILLIGRAM(S): at 00:37

## 2021-01-01 RX ADMIN — Medication 62.5 MILLIMOLE(S): at 03:44

## 2021-01-01 RX ADMIN — Medication 100 MILLIGRAM(S): at 13:26

## 2021-01-01 RX ADMIN — Medication 125 MILLIGRAM(S): at 11:59

## 2021-01-01 RX ADMIN — Medication 25 MILLIGRAM(S): at 05:43

## 2021-01-01 RX ADMIN — Medication 25 MILLIGRAM(S): at 05:21

## 2021-01-01 RX ADMIN — Medication 50 MILLIEQUIVALENT(S): at 07:34

## 2021-01-01 RX ADMIN — Medication 125 MILLIGRAM(S): at 11:39

## 2021-01-01 RX ADMIN — PANTOPRAZOLE SODIUM 40 MILLIGRAM(S): 20 TABLET, DELAYED RELEASE ORAL at 06:25

## 2021-01-01 RX ADMIN — CHLORHEXIDINE GLUCONATE 1 APPLICATION(S): 213 SOLUTION TOPICAL at 05:03

## 2021-01-01 RX ADMIN — Medication 650 MILLIGRAM(S): at 10:50

## 2021-01-01 RX ADMIN — SERTRALINE 100 MILLIGRAM(S): 25 TABLET, FILM COATED ORAL at 12:19

## 2021-01-01 RX ADMIN — Medication 20 MILLIGRAM(S): at 13:40

## 2021-01-01 RX ADMIN — Medication 5 MILLIGRAM(S): at 06:44

## 2021-01-01 RX ADMIN — Medication 100 MILLIGRAM(S): at 22:02

## 2021-01-01 RX ADMIN — Medication 318.75 MILLIGRAM(S): at 17:29

## 2021-01-01 RX ADMIN — Medication 10 MILLIGRAM(S): at 05:05

## 2021-01-01 RX ADMIN — MIRTAZAPINE 7.5 MILLIGRAM(S): 45 TABLET, ORALLY DISINTEGRATING ORAL at 13:44

## 2021-01-01 RX ADMIN — MIRTAZAPINE 7.5 MILLIGRAM(S): 45 TABLET, ORALLY DISINTEGRATING ORAL at 13:00

## 2021-01-01 RX ADMIN — Medication 650 MILLIGRAM(S): at 11:50

## 2021-01-01 RX ADMIN — PANTOPRAZOLE SODIUM 40 MILLIGRAM(S): 20 TABLET, DELAYED RELEASE ORAL at 07:00

## 2021-01-01 RX ADMIN — Medication 10 MILLIGRAM(S): at 12:58

## 2021-01-01 RX ADMIN — CEFEPIME 100 MILLIGRAM(S): 1 INJECTION, POWDER, FOR SOLUTION INTRAMUSCULAR; INTRAVENOUS at 13:16

## 2021-01-01 RX ADMIN — Medication 318.75 MILLIGRAM(S): at 00:35

## 2021-01-01 RX ADMIN — Medication 2: at 06:45

## 2021-01-01 RX ADMIN — Medication 100 MILLIGRAM(S): at 12:30

## 2021-01-01 RX ADMIN — CHLORHEXIDINE GLUCONATE 15 MILLILITER(S): 213 SOLUTION TOPICAL at 05:31

## 2021-01-01 RX ADMIN — Medication 500 MILLIGRAM(S): at 12:30

## 2021-01-01 RX ADMIN — SODIUM CHLORIDE 50 MILLILITER(S): 9 INJECTION, SOLUTION INTRAVENOUS at 15:31

## 2021-01-01 RX ADMIN — PANTOPRAZOLE SODIUM 40 MILLIGRAM(S): 20 TABLET, DELAYED RELEASE ORAL at 05:40

## 2021-01-01 RX ADMIN — Medication 1 TABLET(S): at 17:53

## 2021-01-01 RX ADMIN — Medication 125 MILLILITER(S): at 12:00

## 2021-01-01 RX ADMIN — Medication 318.75 MILLIGRAM(S): at 01:00

## 2021-01-01 RX ADMIN — MIRTAZAPINE 7.5 MILLIGRAM(S): 45 TABLET, ORALLY DISINTEGRATING ORAL at 13:03

## 2021-01-01 RX ADMIN — Medication 10 MILLIGRAM(S): at 13:02

## 2021-01-01 RX ADMIN — CHLORHEXIDINE GLUCONATE 15 MILLILITER(S): 213 SOLUTION TOPICAL at 05:21

## 2021-01-01 RX ADMIN — Medication 10 MILLIGRAM(S): at 21:31

## 2021-01-01 RX ADMIN — CHLORHEXIDINE GLUCONATE 15 MILLILITER(S): 213 SOLUTION TOPICAL at 05:41

## 2021-01-01 RX ADMIN — CHLORHEXIDINE GLUCONATE 15 MILLILITER(S): 213 SOLUTION TOPICAL at 17:19

## 2021-01-01 RX ADMIN — Medication 125 MILLILITER(S): at 10:38

## 2021-01-01 RX ADMIN — Medication 12.5 MILLIGRAM(S): at 05:04

## 2021-01-01 RX ADMIN — CHLORHEXIDINE GLUCONATE 15 MILLILITER(S): 213 SOLUTION TOPICAL at 06:06

## 2021-01-01 RX ADMIN — Medication 1 TABLET(S): at 12:08

## 2021-01-01 RX ADMIN — Medication 500 MILLIGRAM(S): at 01:07

## 2021-01-01 RX ADMIN — CEFEPIME 100 MILLIGRAM(S): 1 INJECTION, POWDER, FOR SOLUTION INTRAMUSCULAR; INTRAVENOUS at 13:50

## 2021-01-01 RX ADMIN — Medication 300 MILLIGRAM(S): at 17:24

## 2021-01-01 RX ADMIN — Medication 100 MILLIGRAM(S): at 05:06

## 2021-01-01 RX ADMIN — Medication 5 MILLIGRAM(S): at 13:50

## 2021-01-01 RX ADMIN — ONDANSETRON 4 MILLIGRAM(S): 8 TABLET, FILM COATED ORAL at 17:37

## 2021-01-01 RX ADMIN — Medication 125 MILLIGRAM(S): at 12:59

## 2021-01-01 RX ADMIN — Medication 500 MILLIGRAM(S): at 05:03

## 2021-01-01 RX ADMIN — PANTOPRAZOLE SODIUM 40 MILLIGRAM(S): 20 TABLET, DELAYED RELEASE ORAL at 17:41

## 2021-01-01 RX ADMIN — SODIUM CHLORIDE 3 MILLILITER(S): 9 INJECTION INTRAMUSCULAR; INTRAVENOUS; SUBCUTANEOUS at 05:46

## 2021-01-01 RX ADMIN — Medication 100 MILLIGRAM(S): at 17:09

## 2021-01-01 RX ADMIN — CEFEPIME 100 MILLIGRAM(S): 1 INJECTION, POWDER, FOR SOLUTION INTRAMUSCULAR; INTRAVENOUS at 06:03

## 2021-01-01 RX ADMIN — Medication 1 TABLET(S): at 13:00

## 2021-01-01 RX ADMIN — Medication 0.25 MILLIGRAM(S): at 21:29

## 2021-01-01 RX ADMIN — SODIUM CHLORIDE 30 MILLILITER(S): 9 INJECTION, SOLUTION INTRAVENOUS at 08:21

## 2021-01-01 RX ADMIN — VECURONIUM BROMIDE 2.5 MILLIGRAM(S): 20 INJECTION, POWDER, FOR SOLUTION INTRAVENOUS at 12:36

## 2021-01-01 RX ADMIN — WARFARIN SODIUM 6 MILLIGRAM(S): 2.5 TABLET ORAL at 22:11

## 2021-01-01 RX ADMIN — Medication 50 MILLIEQUIVALENT(S): at 15:26

## 2021-01-01 RX ADMIN — MEROPENEM 100 MILLIGRAM(S): 1 INJECTION INTRAVENOUS at 13:15

## 2021-01-01 RX ADMIN — Medication 10 MILLIGRAM(S): at 21:52

## 2021-01-01 RX ADMIN — MIRTAZAPINE 7.5 MILLIGRAM(S): 45 TABLET, ORALLY DISINTEGRATING ORAL at 21:46

## 2021-01-01 RX ADMIN — CHLORHEXIDINE GLUCONATE 15 MILLILITER(S): 213 SOLUTION TOPICAL at 05:09

## 2021-01-01 RX ADMIN — PANTOPRAZOLE SODIUM 40 MILLIGRAM(S): 20 TABLET, DELAYED RELEASE ORAL at 12:10

## 2021-01-01 RX ADMIN — SERTRALINE 100 MILLIGRAM(S): 25 TABLET, FILM COATED ORAL at 13:06

## 2021-01-01 RX ADMIN — Medication 100 MILLIGRAM(S): at 18:51

## 2021-01-01 RX ADMIN — AMIODARONE HYDROCHLORIDE 200 MILLIGRAM(S): 400 TABLET ORAL at 05:06

## 2021-01-01 RX ADMIN — Medication 125 MILLILITER(S): at 17:35

## 2021-01-01 RX ADMIN — Medication 81 MILLIGRAM(S): at 11:34

## 2021-01-01 RX ADMIN — HEPARIN SODIUM 5000 UNIT(S): 5000 INJECTION INTRAVENOUS; SUBCUTANEOUS at 23:09

## 2021-01-01 RX ADMIN — Medication 125 MILLIGRAM(S): at 00:48

## 2021-01-01 RX ADMIN — MEROPENEM 100 MILLIGRAM(S): 1 INJECTION INTRAVENOUS at 05:23

## 2021-01-01 RX ADMIN — Medication 650 MILLIGRAM(S): at 16:30

## 2021-01-01 RX ADMIN — CEFEPIME 100 MILLIGRAM(S): 1 INJECTION, POWDER, FOR SOLUTION INTRAMUSCULAR; INTRAVENOUS at 06:33

## 2021-01-01 RX ADMIN — CEFEPIME 100 MILLIGRAM(S): 1 INJECTION, POWDER, FOR SOLUTION INTRAMUSCULAR; INTRAVENOUS at 06:14

## 2021-01-01 RX ADMIN — Medication 10 MILLIGRAM(S): at 21:46

## 2021-01-01 RX ADMIN — Medication 50 GRAM(S): at 02:10

## 2021-01-01 RX ADMIN — PANTOPRAZOLE SODIUM 40 MILLIGRAM(S): 20 TABLET, DELAYED RELEASE ORAL at 05:55

## 2021-01-01 RX ADMIN — Medication 40 MILLIGRAM(S): at 05:12

## 2021-01-01 RX ADMIN — Medication 400 MILLIGRAM(S): at 04:15

## 2021-01-01 RX ADMIN — Medication 50 GRAM(S): at 10:19

## 2021-01-01 RX ADMIN — Medication 100 MILLIGRAM(S): at 12:33

## 2021-01-01 RX ADMIN — FENTANYL CITRATE 12.5 MICROGRAM(S): 50 INJECTION INTRAVENOUS at 13:32

## 2021-01-01 RX ADMIN — FENTANYL CITRATE 12 MICROGRAM(S): 50 INJECTION INTRAVENOUS at 14:44

## 2021-01-01 RX ADMIN — Medication 500 MILLIGRAM(S): at 23:08

## 2021-01-01 RX ADMIN — Medication 50 MILLIEQUIVALENT(S): at 18:00

## 2021-01-01 RX ADMIN — Medication 500 MILLIGRAM(S): at 18:09

## 2021-01-01 RX ADMIN — CHLORHEXIDINE GLUCONATE 15 MILLILITER(S): 213 SOLUTION TOPICAL at 18:14

## 2021-01-01 RX ADMIN — FENTANYL CITRATE 12 MICROGRAM(S): 50 INJECTION INTRAVENOUS at 16:40

## 2021-01-01 RX ADMIN — Medication 10 MILLIGRAM(S): at 21:34

## 2021-01-01 RX ADMIN — SERTRALINE 100 MILLIGRAM(S): 25 TABLET, FILM COATED ORAL at 11:49

## 2021-01-01 RX ADMIN — Medication 1: at 23:35

## 2021-01-01 RX ADMIN — CHLORHEXIDINE GLUCONATE 15 MILLILITER(S): 213 SOLUTION TOPICAL at 17:35

## 2021-01-01 RX ADMIN — CEFEPIME 100 MILLIGRAM(S): 1 INJECTION, POWDER, FOR SOLUTION INTRAMUSCULAR; INTRAVENOUS at 14:06

## 2021-01-01 RX ADMIN — Medication 1: at 17:19

## 2021-01-01 RX ADMIN — PANTOPRAZOLE SODIUM 40 MILLIGRAM(S): 20 TABLET, DELAYED RELEASE ORAL at 05:10

## 2021-01-01 RX ADMIN — PIPERACILLIN AND TAZOBACTAM 25 GRAM(S): 4; .5 INJECTION, POWDER, LYOPHILIZED, FOR SOLUTION INTRAVENOUS at 17:48

## 2021-01-01 RX ADMIN — SODIUM CHLORIDE 3 MILLILITER(S): 9 INJECTION INTRAMUSCULAR; INTRAVENOUS; SUBCUTANEOUS at 21:43

## 2021-01-01 RX ADMIN — SPIRONOLACTONE 25 MILLIGRAM(S): 25 TABLET, FILM COATED ORAL at 05:33

## 2021-01-01 RX ADMIN — SENNA PLUS 2 TABLET(S): 8.6 TABLET ORAL at 21:36

## 2021-01-01 RX ADMIN — REMDESIVIR 500 MILLIGRAM(S): 5 INJECTION INTRAVENOUS at 17:05

## 2021-01-01 RX ADMIN — PROPOFOL 9.42 MICROGRAM(S)/KG/MIN: 10 INJECTION, EMULSION INTRAVENOUS at 12:44

## 2021-01-01 RX ADMIN — Medication 1: at 18:22

## 2021-01-01 RX ADMIN — MIRTAZAPINE 7.5 MILLIGRAM(S): 45 TABLET, ORALLY DISINTEGRATING ORAL at 13:21

## 2021-01-01 RX ADMIN — Medication 81 MILLIGRAM(S): at 12:52

## 2021-01-01 RX ADMIN — MEROPENEM 100 MILLIGRAM(S): 1 INJECTION INTRAVENOUS at 05:41

## 2021-01-01 RX ADMIN — Medication 100 GRAM(S): at 02:30

## 2021-01-01 RX ADMIN — Medication 500 MILLIGRAM(S): at 06:19

## 2021-01-01 RX ADMIN — PANTOPRAZOLE SODIUM 40 MILLIGRAM(S): 20 TABLET, DELAYED RELEASE ORAL at 12:29

## 2021-01-01 RX ADMIN — Medication 100 GRAM(S): at 02:05

## 2021-01-01 RX ADMIN — PANTOPRAZOLE SODIUM 40 MILLIGRAM(S): 20 TABLET, DELAYED RELEASE ORAL at 05:41

## 2021-01-01 RX ADMIN — Medication 125 MILLILITER(S): at 20:41

## 2021-01-01 RX ADMIN — PANTOPRAZOLE SODIUM 40 MILLIGRAM(S): 20 TABLET, DELAYED RELEASE ORAL at 06:41

## 2021-01-01 RX ADMIN — WARFARIN SODIUM 5 MILLIGRAM(S): 2.5 TABLET ORAL at 21:09

## 2021-01-01 RX ADMIN — Medication 1 TABLET(S): at 17:26

## 2021-01-01 RX ADMIN — SERTRALINE 100 MILLIGRAM(S): 25 TABLET, FILM COATED ORAL at 12:59

## 2021-01-01 RX ADMIN — ONDANSETRON 8 MILLIGRAM(S): 8 TABLET, FILM COATED ORAL at 13:26

## 2021-01-01 RX ADMIN — Medication 50 MILLILITER(S): at 06:35

## 2021-01-01 RX ADMIN — Medication 650 MILLIGRAM(S): at 22:00

## 2021-01-01 RX ADMIN — HUMAN INSULIN 6 UNIT(S): 100 INJECTION, SUSPENSION SUBCUTANEOUS at 00:47

## 2021-01-01 RX ADMIN — Medication 5 MILLIGRAM(S): at 20:54

## 2021-01-01 RX ADMIN — Medication 125 MILLIGRAM(S): at 17:41

## 2021-01-01 RX ADMIN — CHLORHEXIDINE GLUCONATE 1 APPLICATION(S): 213 SOLUTION TOPICAL at 07:09

## 2021-01-01 RX ADMIN — Medication 50 GRAM(S): at 15:26

## 2021-01-01 RX ADMIN — CHOLESTYRAMINE 4 GRAM(S): 4 POWDER, FOR SUSPENSION ORAL at 17:16

## 2021-01-01 RX ADMIN — CHLORHEXIDINE GLUCONATE 1 APPLICATION(S): 213 SOLUTION TOPICAL at 05:07

## 2021-01-01 RX ADMIN — PANTOPRAZOLE SODIUM 40 MILLIGRAM(S): 20 TABLET, DELAYED RELEASE ORAL at 11:40

## 2021-01-01 RX ADMIN — Medication 10 MILLIGRAM(S): at 21:02

## 2021-01-01 RX ADMIN — CEFEPIME 100 MILLIGRAM(S): 1 INJECTION, POWDER, FOR SOLUTION INTRAMUSCULAR; INTRAVENOUS at 21:50

## 2021-01-01 RX ADMIN — Medication 100 MILLIGRAM(S): at 11:39

## 2021-01-01 RX ADMIN — HYDROMORPHONE HYDROCHLORIDE 1 MILLIGRAM(S): 2 INJECTION INTRAMUSCULAR; INTRAVENOUS; SUBCUTANEOUS at 10:56

## 2021-01-01 RX ADMIN — PANTOPRAZOLE SODIUM 40 MILLIGRAM(S): 20 TABLET, DELAYED RELEASE ORAL at 18:10

## 2021-01-01 RX ADMIN — PIPERACILLIN AND TAZOBACTAM 25 GRAM(S): 4; .5 INJECTION, POWDER, LYOPHILIZED, FOR SOLUTION INTRAVENOUS at 04:47

## 2021-01-01 RX ADMIN — Medication 6 MILLIGRAM(S): at 06:09

## 2021-01-01 RX ADMIN — Medication 25 MILLIGRAM(S): at 21:09

## 2021-01-01 RX ADMIN — HUMAN INSULIN 6 UNIT(S): 100 INJECTION, SUSPENSION SUBCUTANEOUS at 06:30

## 2021-01-01 RX ADMIN — Medication 125 MILLIGRAM(S): at 18:04

## 2021-01-01 RX ADMIN — Medication 50 MILLIEQUIVALENT(S): at 01:30

## 2021-01-01 RX ADMIN — HEPARIN SODIUM 5000 UNIT(S): 5000 INJECTION INTRAVENOUS; SUBCUTANEOUS at 05:41

## 2021-01-01 RX ADMIN — Medication 50 GRAM(S): at 02:02

## 2021-01-01 RX ADMIN — Medication 7.36 MICROGRAM(S)/KG/MIN: at 08:45

## 2021-01-01 RX ADMIN — Medication 125 MILLIGRAM(S): at 05:38

## 2021-01-01 RX ADMIN — Medication 500 MILLIGRAM(S): at 11:01

## 2021-01-01 RX ADMIN — ONDANSETRON 4 MILLIGRAM(S): 8 TABLET, FILM COATED ORAL at 21:47

## 2021-01-01 RX ADMIN — Medication 20 MILLIEQUIVALENT(S): at 21:00

## 2021-01-01 RX ADMIN — HYDROMORPHONE HYDROCHLORIDE 1 MILLIGRAM(S): 2 INJECTION INTRAMUSCULAR; INTRAVENOUS; SUBCUTANEOUS at 10:50

## 2021-01-01 RX ADMIN — Medication 1 MILLIGRAM(S): at 22:12

## 2021-01-01 RX ADMIN — Medication 20 MILLIEQUIVALENT(S): at 07:43

## 2021-01-01 RX ADMIN — KETAMINE HYDROCHLORIDE 3.93 MG/KG/HR: 100 INJECTION INTRAMUSCULAR; INTRAVENOUS at 19:57

## 2021-01-01 RX ADMIN — Medication 10 MILLIGRAM(S): at 14:06

## 2021-01-01 RX ADMIN — Medication 0.5 MILLIGRAM(S): at 21:21

## 2021-01-01 RX ADMIN — Medication 125 MILLIGRAM(S): at 05:15

## 2021-01-01 RX ADMIN — Medication 1: at 11:59

## 2021-01-01 RX ADMIN — Medication 40 MILLIGRAM(S): at 05:18

## 2021-01-01 RX ADMIN — PANTOPRAZOLE SODIUM 40 MILLIGRAM(S): 20 TABLET, DELAYED RELEASE ORAL at 17:13

## 2021-01-01 RX ADMIN — Medication 12.5 MILLIGRAM(S): at 18:24

## 2021-01-01 RX ADMIN — MEROPENEM 100 MILLIGRAM(S): 1 INJECTION INTRAVENOUS at 05:15

## 2021-01-01 RX ADMIN — Medication 125 MILLIGRAM(S): at 13:01

## 2021-01-01 RX ADMIN — HUMAN INSULIN 10 UNIT(S): 100 INJECTION, SUSPENSION SUBCUTANEOUS at 17:27

## 2021-01-01 RX ADMIN — SPIRONOLACTONE 25 MILLIGRAM(S): 25 TABLET, FILM COATED ORAL at 08:25

## 2021-01-01 RX ADMIN — HUMAN INSULIN 6 UNIT(S): 100 INJECTION, SUSPENSION SUBCUTANEOUS at 00:54

## 2021-01-01 RX ADMIN — Medication 100 MILLIGRAM(S): at 21:05

## 2021-01-01 RX ADMIN — CHLORHEXIDINE GLUCONATE 1 APPLICATION(S): 213 SOLUTION TOPICAL at 05:20

## 2021-01-01 RX ADMIN — CHLORHEXIDINE GLUCONATE 1 APPLICATION(S): 213 SOLUTION TOPICAL at 07:44

## 2021-01-01 RX ADMIN — Medication 50 MILLILITER(S): at 16:20

## 2021-01-01 RX ADMIN — Medication 5 MILLIGRAM(S): at 22:09

## 2021-01-01 RX ADMIN — Medication 318.75 MILLIGRAM(S): at 06:28

## 2021-01-01 RX ADMIN — CHLORHEXIDINE GLUCONATE 1 APPLICATION(S): 213 SOLUTION TOPICAL at 06:00

## 2021-01-01 RX ADMIN — Medication 1 TABLET(S): at 12:15

## 2021-01-01 RX ADMIN — PANTOPRAZOLE SODIUM 40 MILLIGRAM(S): 20 TABLET, DELAYED RELEASE ORAL at 06:46

## 2021-01-01 RX ADMIN — MAGNESIUM OXIDE 400 MG ORAL TABLET 400 MILLIGRAM(S): 241.3 TABLET ORAL at 17:42

## 2021-01-01 RX ADMIN — Medication 125 MILLIGRAM(S): at 17:26

## 2021-01-01 RX ADMIN — HEPARIN SODIUM 5000 UNIT(S): 5000 INJECTION INTRAVENOUS; SUBCUTANEOUS at 21:41

## 2021-01-01 RX ADMIN — SODIUM CHLORIDE 75 MILLILITER(S): 9 INJECTION, SOLUTION INTRAVENOUS at 05:06

## 2021-01-01 RX ADMIN — Medication 125 MILLIGRAM(S): at 17:05

## 2021-01-01 RX ADMIN — MIRTAZAPINE 7.5 MILLIGRAM(S): 45 TABLET, ORALLY DISINTEGRATING ORAL at 11:38

## 2021-01-01 RX ADMIN — CEFEPIME 100 MILLIGRAM(S): 1 INJECTION, POWDER, FOR SOLUTION INTRAMUSCULAR; INTRAVENOUS at 13:28

## 2021-01-01 RX ADMIN — MAGNESIUM OXIDE 400 MG ORAL TABLET 400 MILLIGRAM(S): 241.3 TABLET ORAL at 05:57

## 2021-01-01 RX ADMIN — Medication 1 TABLET(S): at 06:23

## 2021-01-01 RX ADMIN — Medication 40 MILLIEQUIVALENT(S): at 02:10

## 2021-01-01 RX ADMIN — Medication 1: at 12:37

## 2021-01-01 RX ADMIN — Medication 30 MILLIGRAM(S): at 05:07

## 2021-01-01 RX ADMIN — PANTOPRAZOLE SODIUM 40 MILLIGRAM(S): 20 TABLET, DELAYED RELEASE ORAL at 05:16

## 2021-01-01 RX ADMIN — Medication 125 MILLIGRAM(S): at 21:44

## 2021-01-01 RX ADMIN — Medication 10 MILLIGRAM(S): at 21:56

## 2021-01-01 RX ADMIN — Medication 100 MILLIGRAM(S): at 12:44

## 2021-01-01 RX ADMIN — Medication 100 MILLIGRAM(S): at 05:54

## 2021-01-01 RX ADMIN — Medication 15 GRAM(S): at 11:46

## 2021-01-01 RX ADMIN — Medication 50 MILLIEQUIVALENT(S): at 14:00

## 2021-01-01 RX ADMIN — Medication 2: at 13:27

## 2021-01-01 RX ADMIN — CHLORHEXIDINE GLUCONATE 15 MILLILITER(S): 213 SOLUTION TOPICAL at 05:52

## 2021-01-01 RX ADMIN — CEFEPIME 100 MILLIGRAM(S): 1 INJECTION, POWDER, FOR SOLUTION INTRAMUSCULAR; INTRAVENOUS at 22:41

## 2021-01-01 RX ADMIN — CHLORHEXIDINE GLUCONATE 15 MILLILITER(S): 213 SOLUTION TOPICAL at 07:58

## 2021-01-01 RX ADMIN — ONDANSETRON 8 MILLIGRAM(S): 8 TABLET, FILM COATED ORAL at 22:36

## 2021-01-01 RX ADMIN — SCOPALAMINE 1 PATCH: 1 PATCH, EXTENDED RELEASE TRANSDERMAL at 06:57

## 2021-01-01 RX ADMIN — Medication 500 MILLIGRAM(S): at 18:31

## 2021-01-01 RX ADMIN — CEFEPIME 100 MILLIGRAM(S): 1 INJECTION, POWDER, FOR SOLUTION INTRAMUSCULAR; INTRAVENOUS at 10:06

## 2021-01-01 RX ADMIN — POLYETHYLENE GLYCOL 3350 17 GRAM(S): 17 POWDER, FOR SOLUTION ORAL at 11:59

## 2021-01-01 RX ADMIN — PANTOPRAZOLE SODIUM 40 MILLIGRAM(S): 20 TABLET, DELAYED RELEASE ORAL at 17:53

## 2021-01-01 RX ADMIN — MAGNESIUM OXIDE 400 MG ORAL TABLET 400 MILLIGRAM(S): 241.3 TABLET ORAL at 17:27

## 2021-01-01 RX ADMIN — Medication 10 MILLIGRAM(S): at 21:45

## 2021-01-01 RX ADMIN — Medication 10 MILLIGRAM(S): at 20:46

## 2021-01-01 RX ADMIN — Medication 81 MILLIGRAM(S): at 11:40

## 2021-01-01 RX ADMIN — Medication 100 MILLIGRAM(S): at 13:37

## 2021-01-01 RX ADMIN — CHLORHEXIDINE GLUCONATE 1 APPLICATION(S): 213 SOLUTION TOPICAL at 06:04

## 2021-01-01 RX ADMIN — HUMAN INSULIN 10 UNIT(S): 100 INJECTION, SUSPENSION SUBCUTANEOUS at 00:43

## 2021-01-01 RX ADMIN — Medication 500 MILLIGRAM(S): at 00:24

## 2021-01-01 RX ADMIN — ONDANSETRON 4 MILLIGRAM(S): 8 TABLET, FILM COATED ORAL at 03:05

## 2021-01-01 RX ADMIN — Medication 10 MILLIGRAM(S): at 13:06

## 2021-01-01 RX ADMIN — Medication 2: at 00:43

## 2021-01-01 RX ADMIN — Medication 10 MILLIGRAM(S): at 15:42

## 2021-01-01 RX ADMIN — Medication 125 MILLIGRAM(S): at 17:50

## 2021-01-01 RX ADMIN — CEFEPIME 100 MILLIGRAM(S): 1 INJECTION, POWDER, FOR SOLUTION INTRAMUSCULAR; INTRAVENOUS at 22:16

## 2021-01-01 RX ADMIN — HEPARIN SODIUM 5000 UNIT(S): 5000 INJECTION INTRAVENOUS; SUBCUTANEOUS at 13:35

## 2021-01-01 RX ADMIN — CHLORHEXIDINE GLUCONATE 1 APPLICATION(S): 213 SOLUTION TOPICAL at 05:52

## 2021-01-01 RX ADMIN — Medication 500 MILLIGRAM(S): at 17:18

## 2021-01-01 RX ADMIN — Medication 50 MILLIEQUIVALENT(S): at 05:19

## 2021-01-01 RX ADMIN — Medication 25 MILLILITER(S): at 03:33

## 2021-01-01 RX ADMIN — Medication 125 MILLIGRAM(S): at 18:30

## 2021-01-01 RX ADMIN — LIDOCAINE 1 PATCH: 4 CREAM TOPICAL at 19:06

## 2021-01-01 RX ADMIN — Medication 0.5 MILLIGRAM(S): at 22:16

## 2021-01-01 RX ADMIN — PANTOPRAZOLE SODIUM 40 MILLIGRAM(S): 20 TABLET, DELAYED RELEASE ORAL at 18:16

## 2021-01-01 RX ADMIN — Medication 81 MILLIGRAM(S): at 13:13

## 2021-01-01 RX ADMIN — SODIUM CHLORIDE 3 MILLILITER(S): 9 INJECTION INTRAMUSCULAR; INTRAVENOUS; SUBCUTANEOUS at 13:10

## 2021-01-01 RX ADMIN — POLYETHYLENE GLYCOL 3350 17 GRAM(S): 17 POWDER, FOR SOLUTION ORAL at 11:23

## 2021-01-01 RX ADMIN — Medication 50 GRAM(S): at 08:39

## 2021-01-01 RX ADMIN — AMIODARONE HYDROCHLORIDE 200 MILLIGRAM(S): 400 TABLET ORAL at 06:41

## 2021-01-01 RX ADMIN — Medication 1: at 12:15

## 2021-01-01 RX ADMIN — Medication 500 MILLIGRAM(S): at 12:08

## 2021-01-01 RX ADMIN — Medication 2: at 05:11

## 2021-01-01 RX ADMIN — PIPERACILLIN AND TAZOBACTAM 200 GRAM(S): 4; .5 INJECTION, POWDER, LYOPHILIZED, FOR SOLUTION INTRAVENOUS at 10:46

## 2021-01-01 RX ADMIN — Medication 1000 MILLIGRAM(S): at 18:20

## 2021-01-01 RX ADMIN — Medication 10 MILLIGRAM(S): at 21:01

## 2021-01-01 RX ADMIN — Medication 1 TABLET(S): at 12:07

## 2021-01-01 RX ADMIN — Medication 125 MILLIGRAM(S): at 05:21

## 2021-01-01 RX ADMIN — ENOXAPARIN SODIUM 40 MILLIGRAM(S): 100 INJECTION SUBCUTANEOUS at 06:51

## 2021-01-01 RX ADMIN — CHLORHEXIDINE GLUCONATE 15 MILLILITER(S): 213 SOLUTION TOPICAL at 05:30

## 2021-01-01 RX ADMIN — Medication 5 MILLIGRAM(S): at 05:28

## 2021-01-01 RX ADMIN — CHLORHEXIDINE GLUCONATE 1 APPLICATION(S): 213 SOLUTION TOPICAL at 20:00

## 2021-01-01 RX ADMIN — HEPARIN SODIUM 5000 UNIT(S): 5000 INJECTION INTRAVENOUS; SUBCUTANEOUS at 06:30

## 2021-01-01 RX ADMIN — SODIUM CHLORIDE 3 MILLILITER(S): 9 INJECTION INTRAMUSCULAR; INTRAVENOUS; SUBCUTANEOUS at 06:29

## 2021-01-01 RX ADMIN — Medication 1 MILLIGRAM(S): at 13:04

## 2021-01-01 RX ADMIN — Medication 1 TABLET(S): at 13:37

## 2021-01-01 RX ADMIN — Medication 0.25 MILLIGRAM(S): at 21:34

## 2021-01-01 RX ADMIN — ONDANSETRON 4 MILLIGRAM(S): 8 TABLET, FILM COATED ORAL at 21:27

## 2021-01-01 RX ADMIN — SPIRONOLACTONE 12.5 MILLIGRAM(S): 25 TABLET, FILM COATED ORAL at 17:33

## 2021-01-01 RX ADMIN — Medication 25 MILLIGRAM(S): at 17:30

## 2021-01-01 RX ADMIN — ENOXAPARIN SODIUM 30 MILLIGRAM(S): 100 INJECTION SUBCUTANEOUS at 12:58

## 2021-01-01 RX ADMIN — FENTANYL CITRATE 50 MICROGRAM(S): 50 INJECTION INTRAVENOUS at 20:30

## 2021-01-01 RX ADMIN — Medication 25 MILLIGRAM(S): at 06:15

## 2021-01-01 RX ADMIN — OCTREOTIDE ACETATE 50 MICROGRAM(S): 200 INJECTION, SOLUTION INTRAVENOUS; SUBCUTANEOUS at 05:17

## 2021-01-01 RX ADMIN — Medication 10 MILLIGRAM(S): at 13:29

## 2021-01-01 RX ADMIN — SODIUM CHLORIDE 3 MILLILITER(S): 9 INJECTION INTRAMUSCULAR; INTRAVENOUS; SUBCUTANEOUS at 05:07

## 2021-01-01 RX ADMIN — DEXMEDETOMIDINE HYDROCHLORIDE IN 0.9% SODIUM CHLORIDE 9.81 MICROGRAM(S)/KG/HR: 4 INJECTION INTRAVENOUS at 07:55

## 2021-01-01 RX ADMIN — SODIUM CHLORIDE 100 MILLILITER(S): 9 INJECTION INTRAMUSCULAR; INTRAVENOUS; SUBCUTANEOUS at 12:07

## 2021-01-01 RX ADMIN — MEROPENEM 100 MILLIGRAM(S): 1 INJECTION INTRAVENOUS at 13:01

## 2021-01-01 RX ADMIN — SODIUM CHLORIDE 3 MILLILITER(S): 9 INJECTION INTRAMUSCULAR; INTRAVENOUS; SUBCUTANEOUS at 14:08

## 2021-01-01 RX ADMIN — DEXMEDETOMIDINE HYDROCHLORIDE IN 0.9% SODIUM CHLORIDE 9.81 MICROGRAM(S)/KG/HR: 4 INJECTION INTRAVENOUS at 21:01

## 2021-01-01 RX ADMIN — Medication 1 MILLIGRAM(S): at 14:06

## 2021-01-01 RX ADMIN — Medication 100 MILLIGRAM(S): at 13:56

## 2021-01-01 RX ADMIN — ENOXAPARIN SODIUM 30 MILLIGRAM(S): 100 INJECTION SUBCUTANEOUS at 11:29

## 2021-01-01 RX ADMIN — CEFEPIME 100 MILLIGRAM(S): 1 INJECTION, POWDER, FOR SOLUTION INTRAMUSCULAR; INTRAVENOUS at 22:01

## 2021-01-01 RX ADMIN — Medication 1: at 17:43

## 2021-01-01 RX ADMIN — CEFEPIME 100 MILLIGRAM(S): 1 INJECTION, POWDER, FOR SOLUTION INTRAMUSCULAR; INTRAVENOUS at 21:52

## 2021-01-01 RX ADMIN — Medication 500 MILLIGRAM(S): at 18:08

## 2021-01-01 RX ADMIN — ENOXAPARIN SODIUM 60 MILLIGRAM(S): 100 INJECTION SUBCUTANEOUS at 09:15

## 2021-01-01 RX ADMIN — Medication 125 MILLIGRAM(S): at 18:08

## 2021-01-01 RX ADMIN — Medication 100 MILLIGRAM(S): at 21:06

## 2021-01-01 RX ADMIN — PIPERACILLIN AND TAZOBACTAM 25 GRAM(S): 4; .5 INJECTION, POWDER, LYOPHILIZED, FOR SOLUTION INTRAVENOUS at 23:36

## 2021-01-01 RX ADMIN — Medication 500 MILLIGRAM(S): at 17:56

## 2021-01-01 RX ADMIN — CHLORHEXIDINE GLUCONATE 15 MILLILITER(S): 213 SOLUTION TOPICAL at 18:09

## 2021-01-01 RX ADMIN — Medication 1: at 17:24

## 2021-01-01 RX ADMIN — Medication 25 MILLIGRAM(S): at 05:20

## 2021-01-01 RX ADMIN — SODIUM CHLORIDE 50 MILLILITER(S): 9 INJECTION, SOLUTION INTRAVENOUS at 08:25

## 2021-01-01 RX ADMIN — Medication 25 MILLIGRAM(S): at 05:39

## 2021-01-01 RX ADMIN — HEPARIN SODIUM 5000 UNIT(S): 5000 INJECTION INTRAVENOUS; SUBCUTANEOUS at 21:31

## 2021-01-01 RX ADMIN — PANTOPRAZOLE SODIUM 40 MILLIGRAM(S): 20 TABLET, DELAYED RELEASE ORAL at 17:52

## 2021-01-01 RX ADMIN — Medication 500 MILLIGRAM(S): at 05:08

## 2021-01-01 RX ADMIN — Medication 500 MILLIGRAM(S): at 05:14

## 2021-01-01 RX ADMIN — Medication 83.33 MILLIMOLE(S): at 03:04

## 2021-01-01 RX ADMIN — Medication 318.75 MILLIGRAM(S): at 06:35

## 2021-01-01 RX ADMIN — Medication 650 MILLIGRAM(S): at 16:25

## 2021-01-01 RX ADMIN — KETAMINE HYDROCHLORIDE 400 MILLIGRAM(S): 100 INJECTION INTRAMUSCULAR; INTRAVENOUS at 11:17

## 2021-01-01 RX ADMIN — Medication 650 MILLIGRAM(S): at 16:22

## 2021-01-01 RX ADMIN — CHLORHEXIDINE GLUCONATE 1 APPLICATION(S): 213 SOLUTION TOPICAL at 05:58

## 2021-01-01 RX ADMIN — Medication 0.5 MILLIGRAM(S): at 05:00

## 2021-01-01 RX ADMIN — Medication 50 GRAM(S): at 05:36

## 2021-01-01 RX ADMIN — CEFEPIME 100 MILLIGRAM(S): 1 INJECTION, POWDER, FOR SOLUTION INTRAMUSCULAR; INTRAVENOUS at 05:17

## 2021-01-01 RX ADMIN — MAGNESIUM OXIDE 400 MG ORAL TABLET 400 MILLIGRAM(S): 241.3 TABLET ORAL at 06:20

## 2021-01-01 RX ADMIN — Medication 650 MILLIGRAM(S): at 12:45

## 2021-01-01 RX ADMIN — Medication 100 MILLIGRAM(S): at 13:06

## 2021-01-01 RX ADMIN — Medication 10 MILLIGRAM(S): at 05:56

## 2021-01-01 RX ADMIN — Medication 500 MILLIGRAM(S): at 05:38

## 2021-01-01 RX ADMIN — Medication 10 MILLIGRAM(S): at 12:46

## 2021-01-01 RX ADMIN — Medication 25 MILLIGRAM(S): at 12:44

## 2021-01-01 RX ADMIN — Medication 20 MILLIGRAM(S): at 17:56

## 2021-01-01 RX ADMIN — SPIRONOLACTONE 12.5 MILLIGRAM(S): 25 TABLET, FILM COATED ORAL at 17:37

## 2021-01-01 RX ADMIN — AMIODARONE HYDROCHLORIDE 200 MILLIGRAM(S): 400 TABLET ORAL at 06:27

## 2021-01-01 RX ADMIN — DEXMEDETOMIDINE HYDROCHLORIDE IN 0.9% SODIUM CHLORIDE 9.81 MICROGRAM(S)/KG/HR: 4 INJECTION INTRAVENOUS at 21:02

## 2021-01-01 RX ADMIN — Medication 50 MILLIGRAM(S): at 06:15

## 2021-01-01 RX ADMIN — SERTRALINE 100 MILLIGRAM(S): 25 TABLET, FILM COATED ORAL at 12:25

## 2021-01-01 RX ADMIN — CHLORHEXIDINE GLUCONATE 1 APPLICATION(S): 213 SOLUTION TOPICAL at 06:01

## 2021-01-01 RX ADMIN — CEFEPIME 100 MILLIGRAM(S): 1 INJECTION, POWDER, FOR SOLUTION INTRAMUSCULAR; INTRAVENOUS at 13:52

## 2021-01-01 RX ADMIN — ONDANSETRON 8 MILLIGRAM(S): 8 TABLET, FILM COATED ORAL at 21:36

## 2021-01-01 RX ADMIN — MEROPENEM 100 MILLIGRAM(S): 1 INJECTION INTRAVENOUS at 22:24

## 2021-01-01 RX ADMIN — Medication 12.5 MILLIGRAM(S): at 05:31

## 2021-01-01 RX ADMIN — Medication 250 MILLIGRAM(S): at 17:06

## 2021-01-01 RX ADMIN — HUMAN INSULIN 4 UNIT(S): 100 INJECTION, SUSPENSION SUBCUTANEOUS at 00:32

## 2021-01-01 RX ADMIN — Medication 318.75 MILLIGRAM(S): at 11:27

## 2021-01-01 RX ADMIN — HEPARIN SODIUM 5000 UNIT(S): 5000 INJECTION INTRAVENOUS; SUBCUTANEOUS at 14:15

## 2021-01-01 RX ADMIN — SODIUM CHLORIDE 3 MILLILITER(S): 9 INJECTION INTRAMUSCULAR; INTRAVENOUS; SUBCUTANEOUS at 14:42

## 2021-01-01 RX ADMIN — Medication 1 TABLET(S): at 12:45

## 2021-01-01 RX ADMIN — Medication 1 TABLET(S): at 11:29

## 2021-01-01 RX ADMIN — Medication 650 MILLIGRAM(S): at 00:53

## 2021-01-01 RX ADMIN — ONDANSETRON 8 MILLIGRAM(S): 8 TABLET, FILM COATED ORAL at 22:32

## 2021-01-01 RX ADMIN — Medication 25 MILLIGRAM(S): at 16:59

## 2021-01-01 RX ADMIN — SERTRALINE 100 MILLIGRAM(S): 25 TABLET, FILM COATED ORAL at 14:15

## 2021-01-01 RX ADMIN — AMIODARONE HYDROCHLORIDE 200 MILLIGRAM(S): 400 TABLET ORAL at 05:20

## 2021-01-01 RX ADMIN — Medication 100 MILLIGRAM(S): at 21:45

## 2021-01-01 RX ADMIN — Medication 40 MILLIGRAM(S): at 05:41

## 2021-01-01 RX ADMIN — CEFEPIME 100 MILLIGRAM(S): 1 INJECTION, POWDER, FOR SOLUTION INTRAMUSCULAR; INTRAVENOUS at 21:42

## 2021-01-01 RX ADMIN — Medication 100 MILLIGRAM(S): at 12:24

## 2021-01-01 RX ADMIN — HEPARIN SODIUM 5000 UNIT(S): 5000 INJECTION INTRAVENOUS; SUBCUTANEOUS at 21:04

## 2021-01-01 RX ADMIN — Medication 10 MILLIGRAM(S): at 08:52

## 2021-01-01 RX ADMIN — Medication 10 MILLIGRAM(S): at 13:39

## 2021-01-01 RX ADMIN — PANTOPRAZOLE SODIUM 40 MILLIGRAM(S): 20 TABLET, DELAYED RELEASE ORAL at 11:48

## 2021-01-01 RX ADMIN — ONDANSETRON 8 MILLIGRAM(S): 8 TABLET, FILM COATED ORAL at 13:41

## 2021-01-01 RX ADMIN — Medication 20 MILLIEQUIVALENT(S): at 09:15

## 2021-01-01 RX ADMIN — CHLORHEXIDINE GLUCONATE 1 APPLICATION(S): 213 SOLUTION TOPICAL at 06:16

## 2021-01-01 RX ADMIN — Medication 50 MILLIEQUIVALENT(S): at 05:08

## 2021-01-01 RX ADMIN — ONDANSETRON 4 MILLIGRAM(S): 8 TABLET, FILM COATED ORAL at 11:49

## 2021-01-01 RX ADMIN — ONDANSETRON 4 MILLIGRAM(S): 8 TABLET, FILM COATED ORAL at 22:14

## 2021-01-01 RX ADMIN — Medication 1 TABLET(S): at 13:13

## 2021-01-01 RX ADMIN — Medication 25 MILLIGRAM(S): at 15:26

## 2021-01-01 RX ADMIN — PANTOPRAZOLE SODIUM 40 MILLIGRAM(S): 20 TABLET, DELAYED RELEASE ORAL at 17:21

## 2021-01-01 RX ADMIN — Medication 250 MILLIGRAM(S): at 03:20

## 2021-01-01 RX ADMIN — Medication 500 MILLIGRAM(S): at 17:45

## 2021-01-01 RX ADMIN — Medication 40 MILLIGRAM(S): at 18:24

## 2021-01-01 RX ADMIN — PANTOPRAZOLE SODIUM 40 MILLIGRAM(S): 20 TABLET, DELAYED RELEASE ORAL at 05:20

## 2021-01-01 RX ADMIN — AMIODARONE HYDROCHLORIDE 200 MILLIGRAM(S): 400 TABLET ORAL at 05:05

## 2021-01-01 RX ADMIN — Medication 1 TABLET(S): at 12:01

## 2021-01-01 RX ADMIN — HEPARIN SODIUM 5000 UNIT(S): 5000 INJECTION INTRAVENOUS; SUBCUTANEOUS at 05:05

## 2021-01-01 RX ADMIN — PANTOPRAZOLE SODIUM 40 MILLIGRAM(S): 20 TABLET, DELAYED RELEASE ORAL at 05:25

## 2021-01-01 RX ADMIN — Medication 100 GRAM(S): at 03:15

## 2021-01-01 RX ADMIN — Medication 10 MILLIGRAM(S): at 05:33

## 2021-01-01 RX ADMIN — Medication 125 MILLIGRAM(S): at 05:05

## 2021-01-01 RX ADMIN — Medication 62.5 MILLIMOLE(S): at 03:00

## 2021-01-01 RX ADMIN — PANTOPRAZOLE SODIUM 40 MILLIGRAM(S): 20 TABLET, DELAYED RELEASE ORAL at 05:44

## 2021-01-01 RX ADMIN — Medication 2: at 00:00

## 2021-01-01 RX ADMIN — Medication 10 MILLIGRAM(S): at 14:33

## 2021-01-01 RX ADMIN — Medication 125 MILLIGRAM(S): at 17:34

## 2021-01-01 RX ADMIN — OCTREOTIDE ACETATE 50 MICROGRAM(S): 200 INJECTION, SOLUTION INTRAVENOUS; SUBCUTANEOUS at 14:04

## 2021-01-01 RX ADMIN — MIRTAZAPINE 7.5 MILLIGRAM(S): 45 TABLET, ORALLY DISINTEGRATING ORAL at 11:33

## 2021-01-01 RX ADMIN — Medication 25 MILLIGRAM(S): at 17:45

## 2021-01-01 RX ADMIN — Medication 500 MILLIGRAM(S): at 05:05

## 2021-01-01 RX ADMIN — PANTOPRAZOLE SODIUM 40 MILLIGRAM(S): 20 TABLET, DELAYED RELEASE ORAL at 17:58

## 2021-01-01 RX ADMIN — MAGNESIUM OXIDE 400 MG ORAL TABLET 400 MILLIGRAM(S): 241.3 TABLET ORAL at 05:17

## 2021-01-01 RX ADMIN — CHLORHEXIDINE GLUCONATE 1 APPLICATION(S): 213 SOLUTION TOPICAL at 05:31

## 2021-01-01 RX ADMIN — LIDOCAINE 1 PATCH: 4 CREAM TOPICAL at 12:00

## 2021-01-01 RX ADMIN — Medication 500 MILLIGRAM(S): at 11:08

## 2021-01-01 RX ADMIN — SODIUM CHLORIDE 3 MILLILITER(S): 9 INJECTION INTRAMUSCULAR; INTRAVENOUS; SUBCUTANEOUS at 17:27

## 2021-01-01 RX ADMIN — Medication 10 MILLIGRAM(S): at 13:57

## 2021-01-01 RX ADMIN — HEPARIN SODIUM 5000 UNIT(S): 5000 INJECTION INTRAVENOUS; SUBCUTANEOUS at 15:16

## 2021-01-01 RX ADMIN — DEXMEDETOMIDINE HYDROCHLORIDE IN 0.9% SODIUM CHLORIDE 7.7 MICROGRAM(S)/KG/HR: 4 INJECTION INTRAVENOUS at 17:53

## 2021-01-01 RX ADMIN — Medication 125 MILLIGRAM(S): at 12:36

## 2021-01-01 RX ADMIN — MAGNESIUM OXIDE 400 MG ORAL TABLET 400 MILLIGRAM(S): 241.3 TABLET ORAL at 17:11

## 2021-01-01 RX ADMIN — Medication 10 MILLIGRAM(S): at 21:53

## 2021-01-01 RX ADMIN — Medication 10 MILLIGRAM(S): at 05:19

## 2021-01-01 RX ADMIN — Medication 10 MILLIGRAM(S): at 13:33

## 2021-01-01 RX ADMIN — LIDOCAINE 1 PATCH: 4 CREAM TOPICAL at 00:40

## 2021-01-01 RX ADMIN — PANTOPRAZOLE SODIUM 40 MILLIGRAM(S): 20 TABLET, DELAYED RELEASE ORAL at 17:44

## 2021-01-01 RX ADMIN — Medication 125 MILLIGRAM(S): at 17:53

## 2021-01-01 RX ADMIN — Medication 125 MILLIGRAM(S): at 17:46

## 2021-01-01 RX ADMIN — Medication 1: at 05:25

## 2021-01-01 RX ADMIN — SODIUM CHLORIDE 3 MILLILITER(S): 9 INJECTION INTRAMUSCULAR; INTRAVENOUS; SUBCUTANEOUS at 06:09

## 2021-01-01 RX ADMIN — Medication 3.75 MG/MIN: at 18:10

## 2021-01-01 RX ADMIN — Medication 100 MILLIGRAM(S): at 12:14

## 2021-01-01 RX ADMIN — PANTOPRAZOLE SODIUM 40 MILLIGRAM(S): 20 TABLET, DELAYED RELEASE ORAL at 17:17

## 2021-01-01 RX ADMIN — SPIRONOLACTONE 12.5 MILLIGRAM(S): 25 TABLET, FILM COATED ORAL at 05:26

## 2021-01-01 RX ADMIN — DEXMEDETOMIDINE HYDROCHLORIDE IN 0.9% SODIUM CHLORIDE 9.81 MICROGRAM(S)/KG/HR: 4 INJECTION INTRAVENOUS at 22:04

## 2021-01-01 RX ADMIN — PIPERACILLIN AND TAZOBACTAM 25 GRAM(S): 4; .5 INJECTION, POWDER, LYOPHILIZED, FOR SOLUTION INTRAVENOUS at 09:09

## 2021-01-01 RX ADMIN — PANTOPRAZOLE SODIUM 40 MILLIGRAM(S): 20 TABLET, DELAYED RELEASE ORAL at 17:07

## 2021-01-01 RX ADMIN — Medication 500 MILLIGRAM(S): at 12:37

## 2021-01-01 RX ADMIN — CHLORHEXIDINE GLUCONATE 15 MILLILITER(S): 213 SOLUTION TOPICAL at 17:34

## 2021-01-01 RX ADMIN — CEFEPIME 100 MILLIGRAM(S): 1 INJECTION, POWDER, FOR SOLUTION INTRAMUSCULAR; INTRAVENOUS at 06:30

## 2021-01-01 RX ADMIN — CHLORHEXIDINE GLUCONATE 1 APPLICATION(S): 213 SOLUTION TOPICAL at 05:34

## 2021-01-01 RX ADMIN — Medication 250 MILLIGRAM(S): at 17:13

## 2021-01-01 RX ADMIN — Medication 500 MILLIGRAM(S): at 05:11

## 2021-01-01 RX ADMIN — Medication 1: at 17:12

## 2021-01-01 RX ADMIN — PANTOPRAZOLE SODIUM 40 MILLIGRAM(S): 20 TABLET, DELAYED RELEASE ORAL at 11:08

## 2021-01-01 RX ADMIN — PANTOPRAZOLE SODIUM 40 MILLIGRAM(S): 20 TABLET, DELAYED RELEASE ORAL at 06:06

## 2021-01-01 RX ADMIN — SODIUM CHLORIDE 10 MILLILITER(S): 9 INJECTION INTRAMUSCULAR; INTRAVENOUS; SUBCUTANEOUS at 06:30

## 2021-01-01 RX ADMIN — Medication 650 MILLIGRAM(S): at 08:55

## 2021-01-01 RX ADMIN — CHLORHEXIDINE GLUCONATE 1 APPLICATION(S): 213 SOLUTION TOPICAL at 07:01

## 2021-01-01 RX ADMIN — Medication 500 MILLIGRAM(S): at 05:58

## 2021-01-01 RX ADMIN — Medication 25 MILLIGRAM(S): at 17:19

## 2021-01-01 RX ADMIN — SPIRONOLACTONE 25 MILLIGRAM(S): 25 TABLET, FILM COATED ORAL at 17:18

## 2021-01-01 RX ADMIN — Medication 50 GRAM(S): at 08:54

## 2021-01-01 RX ADMIN — PANTOPRAZOLE SODIUM 40 MILLIGRAM(S): 20 TABLET, DELAYED RELEASE ORAL at 17:34

## 2021-01-01 RX ADMIN — Medication 10 MILLIGRAM(S): at 20:44

## 2021-01-01 RX ADMIN — Medication 125 MILLIGRAM(S): at 17:55

## 2021-01-01 RX ADMIN — SODIUM CHLORIDE 3 MILLILITER(S): 9 INJECTION INTRAMUSCULAR; INTRAVENOUS; SUBCUTANEOUS at 21:03

## 2021-01-01 RX ADMIN — Medication 125 MILLIGRAM(S): at 06:07

## 2021-01-01 RX ADMIN — CEFEPIME 100 MILLIGRAM(S): 1 INJECTION, POWDER, FOR SOLUTION INTRAMUSCULAR; INTRAVENOUS at 21:55

## 2021-01-01 RX ADMIN — VECURONIUM BROMIDE 10 MILLIGRAM(S): 20 INJECTION, POWDER, FOR SOLUTION INTRAVENOUS at 10:45

## 2021-01-01 RX ADMIN — Medication 125 MILLIGRAM(S): at 22:00

## 2021-01-01 RX ADMIN — MIRTAZAPINE 7.5 MILLIGRAM(S): 45 TABLET, ORALLY DISINTEGRATING ORAL at 22:23

## 2021-01-01 RX ADMIN — HEPARIN SODIUM 5000 UNIT(S): 5000 INJECTION INTRAVENOUS; SUBCUTANEOUS at 05:15

## 2021-01-01 RX ADMIN — SODIUM CHLORIDE 100 MILLILITER(S): 9 INJECTION INTRAMUSCULAR; INTRAVENOUS; SUBCUTANEOUS at 15:44

## 2021-01-01 RX ADMIN — MIRTAZAPINE 7.5 MILLIGRAM(S): 45 TABLET, ORALLY DISINTEGRATING ORAL at 12:34

## 2021-01-01 RX ADMIN — Medication 5 MILLIGRAM(S): at 00:52

## 2021-01-01 RX ADMIN — Medication 1000 MILLIGRAM(S): at 16:58

## 2021-01-01 RX ADMIN — CEFEPIME 100 MILLIGRAM(S): 1 INJECTION, POWDER, FOR SOLUTION INTRAMUSCULAR; INTRAVENOUS at 06:08

## 2021-01-01 RX ADMIN — Medication 100 MILLIGRAM(S): at 05:07

## 2021-01-01 RX ADMIN — SODIUM CHLORIDE 30 MILLILITER(S): 9 INJECTION, SOLUTION INTRAVENOUS at 23:36

## 2021-01-01 RX ADMIN — Medication 1 TABLET(S): at 13:06

## 2021-01-01 RX ADMIN — Medication 10 MILLIGRAM(S): at 14:23

## 2021-01-01 RX ADMIN — Medication 1: at 11:23

## 2021-01-01 RX ADMIN — Medication 5 MILLIGRAM(S): at 05:33

## 2021-01-01 RX ADMIN — Medication 20 MILLIEQUIVALENT(S): at 16:45

## 2021-01-01 RX ADMIN — ONDANSETRON 8 MILLIGRAM(S): 8 TABLET, FILM COATED ORAL at 13:21

## 2021-01-01 RX ADMIN — HEPARIN SODIUM 5 UNIT(S)/HR: 5000 INJECTION INTRAVENOUS; SUBCUTANEOUS at 10:30

## 2021-01-01 RX ADMIN — Medication 40 MILLIEQUIVALENT(S): at 04:31

## 2021-01-01 RX ADMIN — CHLORHEXIDINE GLUCONATE 15 MILLILITER(S): 213 SOLUTION TOPICAL at 19:03

## 2021-01-01 RX ADMIN — Medication 1: at 00:21

## 2021-01-01 RX ADMIN — Medication 10 MILLIGRAM(S): at 22:31

## 2021-01-01 RX ADMIN — Medication 250 MILLIGRAM(S): at 17:02

## 2021-01-01 RX ADMIN — SPIRONOLACTONE 12.5 MILLIGRAM(S): 25 TABLET, FILM COATED ORAL at 16:24

## 2021-01-01 RX ADMIN — Medication 50 MILLIEQUIVALENT(S): at 16:17

## 2021-01-01 RX ADMIN — Medication 10 MILLIGRAM(S): at 05:12

## 2021-01-01 RX ADMIN — Medication 3 MILLILITER(S): at 11:39

## 2021-01-01 RX ADMIN — ENOXAPARIN SODIUM 40 MILLIGRAM(S): 100 INJECTION SUBCUTANEOUS at 18:15

## 2021-01-01 RX ADMIN — Medication 2: at 00:25

## 2021-01-01 RX ADMIN — MEROPENEM 100 MILLIGRAM(S): 1 INJECTION INTRAVENOUS at 06:26

## 2021-01-01 RX ADMIN — Medication 81 MILLIGRAM(S): at 12:10

## 2021-01-01 RX ADMIN — PANTOPRAZOLE SODIUM 40 MILLIGRAM(S): 20 TABLET, DELAYED RELEASE ORAL at 17:33

## 2021-01-01 RX ADMIN — MIRTAZAPINE 7.5 MILLIGRAM(S): 45 TABLET, ORALLY DISINTEGRATING ORAL at 21:17

## 2021-01-01 RX ADMIN — HEPARIN SODIUM 5000 UNIT(S): 5000 INJECTION INTRAVENOUS; SUBCUTANEOUS at 13:30

## 2021-01-01 RX ADMIN — Medication 125 MILLIGRAM(S): at 17:31

## 2021-01-01 RX ADMIN — CHLORHEXIDINE GLUCONATE 15 MILLILITER(S): 213 SOLUTION TOPICAL at 17:38

## 2021-01-01 RX ADMIN — Medication 10 MILLIGRAM(S): at 13:08

## 2021-01-01 RX ADMIN — SODIUM CHLORIDE 75 MILLILITER(S): 9 INJECTION, SOLUTION INTRAVENOUS at 03:27

## 2021-01-01 RX ADMIN — Medication 2: at 00:48

## 2021-01-01 RX ADMIN — ONDANSETRON 4 MILLIGRAM(S): 8 TABLET, FILM COATED ORAL at 12:42

## 2021-01-01 RX ADMIN — Medication 10 MILLIGRAM(S): at 21:36

## 2021-01-01 RX ADMIN — PANTOPRAZOLE SODIUM 40 MILLIGRAM(S): 20 TABLET, DELAYED RELEASE ORAL at 18:06

## 2021-01-01 RX ADMIN — Medication 50 MILLIEQUIVALENT(S): at 03:09

## 2021-01-01 RX ADMIN — Medication 125 MILLIGRAM(S): at 13:26

## 2021-01-01 RX ADMIN — KETAMINE HYDROCHLORIDE 3.93 MG/KG/HR: 100 INJECTION INTRAMUSCULAR; INTRAVENOUS at 21:03

## 2021-01-01 RX ADMIN — SODIUM CHLORIDE 3 MILLILITER(S): 9 INJECTION INTRAMUSCULAR; INTRAVENOUS; SUBCUTANEOUS at 13:14

## 2021-01-01 RX ADMIN — HEPARIN SODIUM 5000 UNIT(S): 5000 INJECTION INTRAVENOUS; SUBCUTANEOUS at 21:03

## 2021-01-01 RX ADMIN — Medication 20 MILLIGRAM(S): at 18:43

## 2021-01-01 RX ADMIN — Medication 81 MILLIGRAM(S): at 11:15

## 2021-01-01 RX ADMIN — ONDANSETRON 4 MILLIGRAM(S): 8 TABLET, FILM COATED ORAL at 03:51

## 2021-01-01 RX ADMIN — Medication 125 MILLILITER(S): at 08:50

## 2021-01-01 RX ADMIN — Medication 10 MILLIGRAM(S): at 12:33

## 2021-01-01 RX ADMIN — Medication 100 MILLIGRAM(S): at 06:01

## 2021-01-01 RX ADMIN — Medication 1500 MILLILITER(S): at 23:00

## 2021-01-01 RX ADMIN — CHLORHEXIDINE GLUCONATE 1 APPLICATION(S): 213 SOLUTION TOPICAL at 05:24

## 2021-01-01 RX ADMIN — Medication 125 MILLIGRAM(S): at 18:00

## 2021-01-01 RX ADMIN — Medication 250 MILLIGRAM(S): at 17:18

## 2021-01-01 RX ADMIN — Medication 12.5 MILLIGRAM(S): at 06:07

## 2021-01-01 RX ADMIN — Medication 10 MILLIGRAM(S): at 23:17

## 2021-01-01 RX ADMIN — SODIUM CHLORIDE 100 MILLILITER(S): 9 INJECTION INTRAMUSCULAR; INTRAVENOUS; SUBCUTANEOUS at 12:16

## 2021-01-01 RX ADMIN — HEPARIN SODIUM 5000 UNIT(S): 5000 INJECTION INTRAVENOUS; SUBCUTANEOUS at 13:03

## 2021-01-01 RX ADMIN — SERTRALINE 100 MILLIGRAM(S): 25 TABLET, FILM COATED ORAL at 15:21

## 2021-01-01 RX ADMIN — SERTRALINE 50 MILLIGRAM(S): 25 TABLET, FILM COATED ORAL at 11:13

## 2021-01-01 RX ADMIN — Medication 10 MILLIGRAM(S): at 21:05

## 2021-01-01 RX ADMIN — Medication 100 MILLIGRAM(S): at 05:12

## 2021-01-01 RX ADMIN — SODIUM CHLORIDE 3 MILLILITER(S): 9 INJECTION INTRAMUSCULAR; INTRAVENOUS; SUBCUTANEOUS at 15:17

## 2021-01-01 RX ADMIN — Medication 500 MILLIGRAM(S): at 18:41

## 2021-01-01 RX ADMIN — Medication 25 MILLIGRAM(S): at 18:40

## 2021-01-01 RX ADMIN — Medication 650 MILLIGRAM(S): at 06:30

## 2021-01-01 RX ADMIN — Medication 125 MILLIGRAM(S): at 06:58

## 2021-01-01 RX ADMIN — Medication 50 MILLIGRAM(S): at 15:15

## 2021-01-01 RX ADMIN — MEROPENEM 100 MILLIGRAM(S): 1 INJECTION INTRAVENOUS at 21:55

## 2021-01-01 RX ADMIN — Medication 25 MILLIGRAM(S): at 21:19

## 2021-01-01 RX ADMIN — PIPERACILLIN AND TAZOBACTAM 25 GRAM(S): 4; .5 INJECTION, POWDER, LYOPHILIZED, FOR SOLUTION INTRAVENOUS at 10:06

## 2021-01-01 RX ADMIN — Medication 250 MILLIGRAM(S): at 17:31

## 2021-01-01 RX ADMIN — PANTOPRAZOLE SODIUM 40 MILLIGRAM(S): 20 TABLET, DELAYED RELEASE ORAL at 17:06

## 2021-01-01 RX ADMIN — Medication 400 MILLIGRAM(S): at 20:20

## 2021-01-01 RX ADMIN — OCTREOTIDE ACETATE 50 MICROGRAM(S): 200 INJECTION, SOLUTION INTRAVENOUS; SUBCUTANEOUS at 22:09

## 2021-01-01 RX ADMIN — SCOPALAMINE 1 PATCH: 1 PATCH, EXTENDED RELEASE TRANSDERMAL at 21:04

## 2021-01-01 RX ADMIN — OCTREOTIDE ACETATE 50 MICROGRAM(S): 200 INJECTION, SOLUTION INTRAVENOUS; SUBCUTANEOUS at 05:07

## 2021-01-01 RX ADMIN — MIRTAZAPINE 7.5 MILLIGRAM(S): 45 TABLET, ORALLY DISINTEGRATING ORAL at 21:45

## 2021-01-01 RX ADMIN — HEPARIN SODIUM 5000 UNIT(S): 5000 INJECTION INTRAVENOUS; SUBCUTANEOUS at 13:48

## 2021-01-01 RX ADMIN — Medication 10 MILLIGRAM(S): at 05:09

## 2021-01-01 RX ADMIN — Medication 2.5 MILLIGRAM(S): at 20:40

## 2021-01-01 RX ADMIN — HEPARIN SODIUM 5000 UNIT(S): 5000 INJECTION INTRAVENOUS; SUBCUTANEOUS at 23:15

## 2021-01-01 RX ADMIN — CHLORHEXIDINE GLUCONATE 15 MILLILITER(S): 213 SOLUTION TOPICAL at 18:18

## 2021-01-01 RX ADMIN — PANTOPRAZOLE SODIUM 10 MG/HR: 20 TABLET, DELAYED RELEASE ORAL at 05:37

## 2021-01-01 RX ADMIN — SODIUM CHLORIDE 3 MILLILITER(S): 9 INJECTION INTRAMUSCULAR; INTRAVENOUS; SUBCUTANEOUS at 16:00

## 2021-01-01 RX ADMIN — SODIUM CHLORIDE 3 MILLILITER(S): 9 INJECTION INTRAMUSCULAR; INTRAVENOUS; SUBCUTANEOUS at 05:20

## 2021-01-01 RX ADMIN — SERTRALINE 50 MILLIGRAM(S): 25 TABLET, FILM COATED ORAL at 13:00

## 2021-01-01 RX ADMIN — Medication 125 MILLILITER(S): at 21:37

## 2021-01-01 RX ADMIN — HYDROMORPHONE HYDROCHLORIDE 0.5 MILLIGRAM(S): 2 INJECTION INTRAMUSCULAR; INTRAVENOUS; SUBCUTANEOUS at 03:00

## 2021-01-01 RX ADMIN — Medication 125 MILLIGRAM(S): at 06:08

## 2021-01-01 RX ADMIN — Medication 1: at 17:36

## 2021-01-01 RX ADMIN — Medication 50 GRAM(S): at 15:55

## 2021-01-01 RX ADMIN — Medication 2: at 11:26

## 2021-01-01 RX ADMIN — Medication 1 TABLET(S): at 05:38

## 2021-01-01 RX ADMIN — PANTOPRAZOLE SODIUM 40 MILLIGRAM(S): 20 TABLET, DELAYED RELEASE ORAL at 05:21

## 2021-01-01 RX ADMIN — MIRTAZAPINE 7.5 MILLIGRAM(S): 45 TABLET, ORALLY DISINTEGRATING ORAL at 22:08

## 2021-01-01 RX ADMIN — Medication 1 TABLET(S): at 11:33

## 2021-01-01 RX ADMIN — Medication 1 TABLET(S): at 12:49

## 2021-01-01 RX ADMIN — Medication 125 MILLIGRAM(S): at 13:05

## 2021-01-01 RX ADMIN — Medication 1 TABLET(S): at 11:18

## 2021-01-01 RX ADMIN — Medication 125 MILLIGRAM(S): at 17:07

## 2021-01-01 RX ADMIN — PANTOPRAZOLE SODIUM 40 MILLIGRAM(S): 20 TABLET, DELAYED RELEASE ORAL at 18:30

## 2021-01-01 RX ADMIN — Medication 500 MILLIGRAM(S): at 06:00

## 2021-01-01 RX ADMIN — Medication 25 MILLIGRAM(S): at 21:03

## 2021-01-01 RX ADMIN — Medication 125 MILLIGRAM(S): at 06:24

## 2021-01-01 RX ADMIN — CHLORHEXIDINE GLUCONATE 15 MILLILITER(S): 213 SOLUTION TOPICAL at 18:31

## 2021-01-01 RX ADMIN — MEROPENEM 100 MILLIGRAM(S): 1 INJECTION INTRAVENOUS at 05:21

## 2021-01-01 RX ADMIN — SPIRONOLACTONE 25 MILLIGRAM(S): 25 TABLET, FILM COATED ORAL at 06:46

## 2021-01-01 RX ADMIN — SPIRONOLACTONE 25 MILLIGRAM(S): 25 TABLET, FILM COATED ORAL at 06:00

## 2021-01-01 RX ADMIN — Medication 20 MILLIEQUIVALENT(S): at 05:15

## 2021-01-01 RX ADMIN — Medication 125 MILLILITER(S): at 15:27

## 2021-01-01 RX ADMIN — Medication 25 MILLIGRAM(S): at 13:38

## 2021-01-01 RX ADMIN — Medication 15 GRAM(S): at 12:38

## 2021-01-01 RX ADMIN — Medication 1 TABLET(S): at 05:52

## 2021-01-01 RX ADMIN — Medication 81 MILLIGRAM(S): at 12:38

## 2021-01-01 RX ADMIN — Medication 10 MILLIGRAM(S): at 14:08

## 2021-01-01 RX ADMIN — Medication 20 MILLIGRAM(S): at 21:03

## 2021-01-01 RX ADMIN — Medication 1 TABLET(S): at 12:14

## 2021-01-01 RX ADMIN — CHLORHEXIDINE GLUCONATE 15 MILLILITER(S): 213 SOLUTION TOPICAL at 05:40

## 2021-01-01 RX ADMIN — Medication 100 MILLIGRAM(S): at 11:45

## 2021-01-01 RX ADMIN — CLOPIDOGREL BISULFATE 75 MILLIGRAM(S): 75 TABLET, FILM COATED ORAL at 12:37

## 2021-01-01 RX ADMIN — Medication 81 MILLIGRAM(S): at 12:54

## 2021-01-01 RX ADMIN — CEFEPIME 100 MILLIGRAM(S): 1 INJECTION, POWDER, FOR SOLUTION INTRAMUSCULAR; INTRAVENOUS at 08:00

## 2021-01-01 RX ADMIN — SPIRONOLACTONE 25 MILLIGRAM(S): 25 TABLET, FILM COATED ORAL at 09:11

## 2021-01-01 RX ADMIN — Medication 3 MILLILITER(S): at 00:05

## 2021-01-01 RX ADMIN — CHLORHEXIDINE GLUCONATE 1 APPLICATION(S): 213 SOLUTION TOPICAL at 05:22

## 2021-01-01 RX ADMIN — FENTANYL CITRATE 50 MICROGRAM(S): 50 INJECTION INTRAVENOUS at 20:08

## 2021-01-01 RX ADMIN — CHLORHEXIDINE GLUCONATE 15 MILLILITER(S): 213 SOLUTION TOPICAL at 17:33

## 2021-01-01 RX ADMIN — MEROPENEM 100 MILLIGRAM(S): 1 INJECTION INTRAVENOUS at 05:04

## 2021-01-01 RX ADMIN — Medication 650 MILLIGRAM(S): at 01:10

## 2021-01-01 RX ADMIN — Medication 50 MILLIEQUIVALENT(S): at 01:59

## 2021-01-01 RX ADMIN — PANTOPRAZOLE SODIUM 40 MILLIGRAM(S): 20 TABLET, DELAYED RELEASE ORAL at 05:27

## 2021-01-01 RX ADMIN — PANTOPRAZOLE SODIUM 40 MILLIGRAM(S): 20 TABLET, DELAYED RELEASE ORAL at 05:14

## 2021-01-01 RX ADMIN — Medication 3 MILLILITER(S): at 17:06

## 2021-01-01 RX ADMIN — CEFEPIME 100 MILLIGRAM(S): 1 INJECTION, POWDER, FOR SOLUTION INTRAMUSCULAR; INTRAVENOUS at 12:03

## 2021-01-01 RX ADMIN — SERTRALINE 100 MILLIGRAM(S): 25 TABLET, FILM COATED ORAL at 11:46

## 2021-01-01 RX ADMIN — MIRTAZAPINE 7.5 MILLIGRAM(S): 45 TABLET, ORALLY DISINTEGRATING ORAL at 21:25

## 2021-01-01 RX ADMIN — PANTOPRAZOLE SODIUM 40 MILLIGRAM(S): 20 TABLET, DELAYED RELEASE ORAL at 05:02

## 2021-01-01 RX ADMIN — Medication 500 MILLIGRAM(S): at 17:52

## 2021-01-01 RX ADMIN — Medication 650 MILLIGRAM(S): at 22:38

## 2021-01-01 RX ADMIN — CHLORHEXIDINE GLUCONATE 1 APPLICATION(S): 213 SOLUTION TOPICAL at 05:21

## 2021-01-01 RX ADMIN — Medication 10 MILLIGRAM(S): at 05:17

## 2021-01-01 RX ADMIN — HUMAN INSULIN 4 UNIT(S): 100 INJECTION, SUSPENSION SUBCUTANEOUS at 00:59

## 2021-01-01 RX ADMIN — AMIODARONE HYDROCHLORIDE 600 MILLIGRAM(S): 400 TABLET ORAL at 19:50

## 2021-01-01 RX ADMIN — Medication 4 MILLIGRAM(S): at 05:15

## 2021-01-01 RX ADMIN — Medication 5 MILLIGRAM(S): at 18:30

## 2021-01-01 RX ADMIN — PANTOPRAZOLE SODIUM 40 MILLIGRAM(S): 20 TABLET, DELAYED RELEASE ORAL at 17:40

## 2021-01-01 RX ADMIN — CHLORHEXIDINE GLUCONATE 1 APPLICATION(S): 213 SOLUTION TOPICAL at 05:10

## 2021-01-01 RX ADMIN — SODIUM CHLORIDE 10 MILLILITER(S): 9 INJECTION INTRAMUSCULAR; INTRAVENOUS; SUBCUTANEOUS at 08:00

## 2021-01-01 RX ADMIN — Medication 500 MILLIGRAM(S): at 12:07

## 2021-01-01 RX ADMIN — Medication 10 MILLIGRAM(S): at 10:29

## 2021-01-01 RX ADMIN — Medication 5 MILLIGRAM(S): at 10:44

## 2021-01-01 RX ADMIN — Medication 0.25 MILLIGRAM(S): at 23:10

## 2021-01-01 RX ADMIN — PANTOPRAZOLE SODIUM 40 MILLIGRAM(S): 20 TABLET, DELAYED RELEASE ORAL at 21:33

## 2021-01-01 RX ADMIN — HEPARIN SODIUM 5000 UNIT(S): 5000 INJECTION INTRAVENOUS; SUBCUTANEOUS at 05:03

## 2021-01-01 RX ADMIN — Medication 1 TABLET(S): at 12:33

## 2021-01-01 RX ADMIN — Medication 20 MILLIEQUIVALENT(S): at 05:02

## 2021-01-01 RX ADMIN — HEPARIN SODIUM 5000 UNIT(S): 5000 INJECTION INTRAVENOUS; SUBCUTANEOUS at 13:50

## 2021-01-01 RX ADMIN — Medication 40 MILLIGRAM(S): at 06:39

## 2021-01-01 RX ADMIN — CEFEPIME 100 MILLIGRAM(S): 1 INJECTION, POWDER, FOR SOLUTION INTRAMUSCULAR; INTRAVENOUS at 05:24

## 2021-01-01 RX ADMIN — CEFEPIME 100 MILLIGRAM(S): 1 INJECTION, POWDER, FOR SOLUTION INTRAMUSCULAR; INTRAVENOUS at 13:10

## 2021-01-01 RX ADMIN — Medication 125 MILLIGRAM(S): at 00:22

## 2021-01-01 RX ADMIN — Medication 125 MILLIGRAM(S): at 17:40

## 2021-01-01 RX ADMIN — MIRTAZAPINE 7.5 MILLIGRAM(S): 45 TABLET, ORALLY DISINTEGRATING ORAL at 21:51

## 2021-01-01 RX ADMIN — Medication 1: at 12:13

## 2021-01-01 RX ADMIN — Medication 1 TABLET(S): at 18:02

## 2021-01-01 RX ADMIN — SODIUM CHLORIDE 50 MILLILITER(S): 9 INJECTION, SOLUTION INTRAVENOUS at 01:15

## 2021-01-01 RX ADMIN — SODIUM CHLORIDE 3 MILLILITER(S): 9 INJECTION INTRAMUSCULAR; INTRAVENOUS; SUBCUTANEOUS at 21:53

## 2021-01-01 RX ADMIN — Medication 500 MILLIGRAM(S): at 05:07

## 2021-01-01 RX ADMIN — Medication 500 MILLIGRAM(S): at 17:10

## 2021-01-01 RX ADMIN — SERTRALINE 100 MILLIGRAM(S): 25 TABLET, FILM COATED ORAL at 13:09

## 2021-01-01 RX ADMIN — Medication 10 MILLIGRAM(S): at 05:06

## 2021-01-01 RX ADMIN — DEXTROSE MONOHYDRATE, SODIUM CHLORIDE, AND POTASSIUM CHLORIDE 30 MILLILITER(S): 50; .745; 4.5 INJECTION, SOLUTION INTRAVENOUS at 05:39

## 2021-01-01 RX ADMIN — Medication 40 MILLIGRAM(S): at 05:02

## 2021-01-01 RX ADMIN — Medication 100 MILLIGRAM(S): at 13:05

## 2021-01-01 RX ADMIN — Medication 100 MILLIGRAM(S): at 13:24

## 2021-01-01 RX ADMIN — Medication 10 MILLIGRAM(S): at 05:48

## 2021-01-01 RX ADMIN — Medication 1 TABLET(S): at 11:14

## 2021-01-01 RX ADMIN — ONDANSETRON 8 MILLIGRAM(S): 8 TABLET, FILM COATED ORAL at 21:03

## 2021-01-01 RX ADMIN — MIRTAZAPINE 7.5 MILLIGRAM(S): 45 TABLET, ORALLY DISINTEGRATING ORAL at 13:26

## 2021-01-01 RX ADMIN — Medication 650 MILLIGRAM(S): at 09:20

## 2021-01-01 RX ADMIN — Medication 10 MILLIGRAM(S): at 22:23

## 2021-01-01 RX ADMIN — Medication 10 MILLIGRAM(S): at 12:52

## 2021-01-01 RX ADMIN — Medication 50 GRAM(S): at 06:17

## 2021-01-01 RX ADMIN — Medication 500 MILLIGRAM(S): at 11:22

## 2021-01-01 RX ADMIN — Medication 5 MILLIGRAM(S): at 12:13

## 2021-01-01 RX ADMIN — Medication 125 MILLIGRAM(S): at 10:47

## 2021-01-01 RX ADMIN — Medication 318.75 MILLIGRAM(S): at 17:45

## 2021-01-01 RX ADMIN — Medication 10 MILLIGRAM(S): at 21:00

## 2021-01-01 RX ADMIN — Medication 125 MILLIGRAM(S): at 01:26

## 2021-01-01 RX ADMIN — Medication 1: at 18:17

## 2021-01-01 RX ADMIN — HUMAN INSULIN 12 UNIT(S): 100 INJECTION, SUSPENSION SUBCUTANEOUS at 06:15

## 2021-01-01 RX ADMIN — Medication 1 MILLIGRAM(S): at 21:52

## 2021-01-01 RX ADMIN — Medication 10 MILLIGRAM(S): at 21:10

## 2021-01-01 RX ADMIN — SERTRALINE 100 MILLIGRAM(S): 25 TABLET, FILM COATED ORAL at 11:19

## 2021-01-01 RX ADMIN — Medication 10 MILLIGRAM(S): at 10:20

## 2021-01-01 RX ADMIN — CHLORHEXIDINE GLUCONATE 5 MILLILITER(S): 213 SOLUTION TOPICAL at 21:58

## 2021-01-01 RX ADMIN — Medication 62.5 MILLIMOLE(S): at 04:25

## 2021-01-01 RX ADMIN — SERTRALINE 100 MILLIGRAM(S): 25 TABLET, FILM COATED ORAL at 11:22

## 2021-01-01 RX ADMIN — AMIODARONE HYDROCHLORIDE 200 MILLIGRAM(S): 400 TABLET ORAL at 05:10

## 2021-01-01 RX ADMIN — Medication 125 MILLIGRAM(S): at 17:22

## 2021-01-01 RX ADMIN — PANTOPRAZOLE SODIUM 40 MILLIGRAM(S): 20 TABLET, DELAYED RELEASE ORAL at 17:45

## 2021-01-01 RX ADMIN — Medication 50 MILLIEQUIVALENT(S): at 16:13

## 2021-01-01 RX ADMIN — Medication 500 MILLIGRAM(S): at 00:38

## 2021-01-01 RX ADMIN — SODIUM CHLORIDE 3 MILLILITER(S): 9 INJECTION INTRAMUSCULAR; INTRAVENOUS; SUBCUTANEOUS at 13:31

## 2021-01-01 RX ADMIN — Medication 50 MILLIEQUIVALENT(S): at 11:12

## 2021-01-01 RX ADMIN — Medication 4 MILLIGRAM(S): at 05:09

## 2021-01-01 RX ADMIN — Medication 1000 MILLIGRAM(S): at 18:24

## 2021-01-01 RX ADMIN — MIRTAZAPINE 7.5 MILLIGRAM(S): 45 TABLET, ORALLY DISINTEGRATING ORAL at 21:34

## 2021-01-01 RX ADMIN — CEFEPIME 100 MILLIGRAM(S): 1 INJECTION, POWDER, FOR SOLUTION INTRAMUSCULAR; INTRAVENOUS at 21:18

## 2021-01-01 RX ADMIN — Medication 81 MILLIGRAM(S): at 12:49

## 2021-01-01 RX ADMIN — Medication 50 GRAM(S): at 04:25

## 2021-01-01 RX ADMIN — CHLORHEXIDINE GLUCONATE 15 MILLILITER(S): 213 SOLUTION TOPICAL at 18:05

## 2021-01-01 RX ADMIN — Medication 10 MILLIGRAM(S): at 22:35

## 2021-01-01 RX ADMIN — HYDROMORPHONE HYDROCHLORIDE 0.5 MILLIGRAM(S): 2 INJECTION INTRAMUSCULAR; INTRAVENOUS; SUBCUTANEOUS at 16:59

## 2021-01-01 RX ADMIN — SODIUM CHLORIDE 3 MILLILITER(S): 9 INJECTION INTRAMUSCULAR; INTRAVENOUS; SUBCUTANEOUS at 12:44

## 2021-01-01 RX ADMIN — MIRTAZAPINE 7.5 MILLIGRAM(S): 45 TABLET, ORALLY DISINTEGRATING ORAL at 12:50

## 2021-01-01 RX ADMIN — Medication 10 MILLIGRAM(S): at 05:34

## 2021-01-01 RX ADMIN — MIRTAZAPINE 7.5 MILLIGRAM(S): 45 TABLET, ORALLY DISINTEGRATING ORAL at 21:33

## 2021-01-01 RX ADMIN — Medication 1000 MILLIGRAM(S): at 20:35

## 2021-01-01 RX ADMIN — Medication 10 MILLIGRAM(S): at 10:14

## 2021-01-01 RX ADMIN — CHLORHEXIDINE GLUCONATE 15 MILLILITER(S): 213 SOLUTION TOPICAL at 05:24

## 2021-01-01 RX ADMIN — Medication 50 MILLIGRAM(S): at 22:00

## 2021-01-01 RX ADMIN — SPIRONOLACTONE 25 MILLIGRAM(S): 25 TABLET, FILM COATED ORAL at 06:15

## 2021-01-01 RX ADMIN — Medication 500 MICROGRAM(S): at 23:31

## 2021-01-01 RX ADMIN — ONDANSETRON 8 MILLIGRAM(S): 8 TABLET, FILM COATED ORAL at 14:55

## 2021-01-01 RX ADMIN — SPIRONOLACTONE 25 MILLIGRAM(S): 25 TABLET, FILM COATED ORAL at 06:02

## 2021-01-01 RX ADMIN — Medication 1 TABLET(S): at 11:37

## 2021-01-01 RX ADMIN — Medication 125 MILLIGRAM(S): at 05:20

## 2021-01-01 RX ADMIN — PANTOPRAZOLE SODIUM 40 MILLIGRAM(S): 20 TABLET, DELAYED RELEASE ORAL at 06:30

## 2021-01-01 RX ADMIN — Medication 0.5 MILLIGRAM(S): at 05:06

## 2021-01-01 RX ADMIN — Medication 500 MILLIGRAM(S): at 12:38

## 2021-01-01 RX ADMIN — CHLORHEXIDINE GLUCONATE 15 MILLILITER(S): 213 SOLUTION TOPICAL at 05:23

## 2021-01-01 RX ADMIN — SERTRALINE 100 MILLIGRAM(S): 25 TABLET, FILM COATED ORAL at 12:05

## 2021-01-01 RX ADMIN — Medication 10 MILLIGRAM(S): at 06:23

## 2021-01-01 RX ADMIN — Medication 10 MILLIGRAM(S): at 14:20

## 2021-01-01 RX ADMIN — INSULIN HUMAN 2 UNIT(S)/HR: 100 INJECTION, SOLUTION SUBCUTANEOUS at 12:28

## 2021-01-01 RX ADMIN — Medication 125 MILLIGRAM(S): at 02:57

## 2021-01-01 RX ADMIN — Medication 10 MILLIGRAM(S): at 13:51

## 2021-01-01 RX ADMIN — SERTRALINE 100 MILLIGRAM(S): 25 TABLET, FILM COATED ORAL at 13:05

## 2021-01-01 RX ADMIN — Medication 100 MILLIGRAM(S): at 05:02

## 2021-01-01 RX ADMIN — SIMETHICONE 80 MILLIGRAM(S): 80 TABLET, CHEWABLE ORAL at 21:53

## 2021-01-01 RX ADMIN — Medication 650 MILLIGRAM(S): at 23:30

## 2021-01-01 RX ADMIN — PANTOPRAZOLE SODIUM 40 MILLIGRAM(S): 20 TABLET, DELAYED RELEASE ORAL at 17:22

## 2021-01-01 RX ADMIN — Medication 100 MILLIGRAM(S): at 11:13

## 2021-01-01 RX ADMIN — HEPARIN SODIUM 5000 UNIT(S): 5000 INJECTION INTRAVENOUS; SUBCUTANEOUS at 14:01

## 2021-01-01 RX ADMIN — Medication 7.36 MICROGRAM(S)/KG/MIN: at 20:09

## 2021-01-01 RX ADMIN — Medication 25 MILLIGRAM(S): at 20:14

## 2021-01-01 RX ADMIN — Medication 300 MILLIGRAM(S): at 17:43

## 2021-01-01 RX ADMIN — PANTOPRAZOLE SODIUM 40 MILLIGRAM(S): 20 TABLET, DELAYED RELEASE ORAL at 11:49

## 2021-01-01 RX ADMIN — Medication 83.33 MILLIMOLE(S): at 02:00

## 2021-01-01 RX ADMIN — Medication 10 MILLIGRAM(S): at 21:04

## 2021-01-01 RX ADMIN — Medication 4: at 13:13

## 2021-01-01 RX ADMIN — MIRTAZAPINE 7.5 MILLIGRAM(S): 45 TABLET, ORALLY DISINTEGRATING ORAL at 20:54

## 2021-01-01 RX ADMIN — Medication 125 MILLIGRAM(S): at 05:31

## 2021-01-01 RX ADMIN — PANTOPRAZOLE SODIUM 40 MILLIGRAM(S): 20 TABLET, DELAYED RELEASE ORAL at 17:28

## 2021-01-01 RX ADMIN — Medication 250 MILLIGRAM(S): at 04:06

## 2021-01-01 RX ADMIN — Medication 1 MILLIGRAM(S): at 05:51

## 2021-01-01 RX ADMIN — CEFEPIME 100 MILLIGRAM(S): 1 INJECTION, POWDER, FOR SOLUTION INTRAMUSCULAR; INTRAVENOUS at 02:01

## 2021-01-01 RX ADMIN — Medication 0.5 MILLIGRAM(S): at 22:42

## 2021-01-01 RX ADMIN — Medication 1 TABLET(S): at 14:36

## 2021-01-01 RX ADMIN — HEPARIN SODIUM 5000 UNIT(S): 5000 INJECTION INTRAVENOUS; SUBCUTANEOUS at 21:34

## 2021-01-01 RX ADMIN — HEPARIN SODIUM 5000 UNIT(S): 5000 INJECTION INTRAVENOUS; SUBCUTANEOUS at 21:36

## 2021-01-01 RX ADMIN — CHLORHEXIDINE GLUCONATE 1 APPLICATION(S): 213 SOLUTION TOPICAL at 05:02

## 2021-01-01 RX ADMIN — PANTOPRAZOLE SODIUM 40 MILLIGRAM(S): 20 TABLET, DELAYED RELEASE ORAL at 18:23

## 2021-01-01 RX ADMIN — SODIUM CHLORIDE 3 MILLILITER(S): 9 INJECTION INTRAMUSCULAR; INTRAVENOUS; SUBCUTANEOUS at 14:07

## 2021-01-01 RX ADMIN — Medication 3: at 00:54

## 2021-01-01 RX ADMIN — SODIUM CHLORIDE 3 MILLILITER(S): 9 INJECTION INTRAMUSCULAR; INTRAVENOUS; SUBCUTANEOUS at 21:36

## 2021-01-01 RX ADMIN — Medication 100 GRAM(S): at 02:29

## 2021-01-01 RX ADMIN — Medication 250 MILLIGRAM(S): at 06:08

## 2021-01-01 RX ADMIN — Medication 60 MILLIGRAM(S): at 05:36

## 2021-01-01 RX ADMIN — FENTANYL CITRATE 100 MICROGRAM(S): 50 INJECTION INTRAVENOUS at 21:15

## 2021-01-01 RX ADMIN — FENTANYL CITRATE 50 MICROGRAM(S): 50 INJECTION INTRAVENOUS at 07:50

## 2021-01-01 RX ADMIN — Medication 50 MILLIEQUIVALENT(S): at 12:36

## 2021-01-01 RX ADMIN — HEPARIN SODIUM 5000 UNIT(S): 5000 INJECTION INTRAVENOUS; SUBCUTANEOUS at 05:13

## 2021-01-01 RX ADMIN — Medication 500 MILLIGRAM(S): at 11:09

## 2021-01-01 RX ADMIN — Medication 50 MILLIEQUIVALENT(S): at 10:32

## 2021-01-01 RX ADMIN — PANTOPRAZOLE SODIUM 40 MILLIGRAM(S): 20 TABLET, DELAYED RELEASE ORAL at 17:31

## 2021-01-01 RX ADMIN — Medication 125 MILLIGRAM(S): at 06:19

## 2021-01-01 RX ADMIN — Medication 10 MILLIGRAM(S): at 05:37

## 2021-01-01 RX ADMIN — MIRTAZAPINE 7.5 MILLIGRAM(S): 45 TABLET, ORALLY DISINTEGRATING ORAL at 13:25

## 2021-01-01 RX ADMIN — PANTOPRAZOLE SODIUM 40 MILLIGRAM(S): 20 TABLET, DELAYED RELEASE ORAL at 05:34

## 2021-01-01 RX ADMIN — CHLORHEXIDINE GLUCONATE 15 MILLILITER(S): 213 SOLUTION TOPICAL at 17:39

## 2021-01-01 RX ADMIN — Medication 650 MILLIGRAM(S): at 11:58

## 2021-01-01 RX ADMIN — Medication 125 MILLIGRAM(S): at 17:36

## 2021-01-01 RX ADMIN — Medication 125 MILLIGRAM(S): at 18:35

## 2021-01-01 RX ADMIN — Medication 100 MILLIGRAM(S): at 14:42

## 2021-01-01 RX ADMIN — CEFEPIME 100 MILLIGRAM(S): 1 INJECTION, POWDER, FOR SOLUTION INTRAMUSCULAR; INTRAVENOUS at 22:42

## 2021-01-01 RX ADMIN — HEPARIN SODIUM 11.5 UNIT(S)/HR: 5000 INJECTION INTRAVENOUS; SUBCUTANEOUS at 10:21

## 2021-01-01 RX ADMIN — WARFARIN SODIUM 5 MILLIGRAM(S): 2.5 TABLET ORAL at 21:58

## 2021-01-01 RX ADMIN — MEROPENEM 100 MILLIGRAM(S): 1 INJECTION INTRAVENOUS at 22:49

## 2021-01-01 RX ADMIN — Medication 10 MILLIGRAM(S): at 13:38

## 2021-01-01 RX ADMIN — CHLORHEXIDINE GLUCONATE 15 MILLILITER(S): 213 SOLUTION TOPICAL at 17:24

## 2021-01-01 RX ADMIN — Medication 50 MILLIGRAM(S): at 17:17

## 2021-01-01 RX ADMIN — Medication 400 MILLIGRAM(S): at 01:45

## 2021-01-01 RX ADMIN — Medication 20 MILLIGRAM(S): at 14:02

## 2021-01-01 RX ADMIN — Medication 10 MILLIGRAM(S): at 14:00

## 2021-01-01 RX ADMIN — Medication 500 MILLIGRAM(S): at 17:23

## 2021-01-01 RX ADMIN — Medication 50 MILLIEQUIVALENT(S): at 11:59

## 2021-01-01 RX ADMIN — ONDANSETRON 4 MILLIGRAM(S): 8 TABLET, FILM COATED ORAL at 14:23

## 2021-01-01 RX ADMIN — Medication 1 TABLET(S): at 12:48

## 2021-01-01 RX ADMIN — Medication 10 MILLIGRAM(S): at 11:37

## 2021-01-01 RX ADMIN — CHLORHEXIDINE GLUCONATE 15 MILLILITER(S): 213 SOLUTION TOPICAL at 05:01

## 2021-01-01 RX ADMIN — Medication 2: at 13:50

## 2021-01-01 RX ADMIN — Medication 10 MILLIGRAM(S): at 08:47

## 2021-01-01 RX ADMIN — Medication 1 TABLET(S): at 08:50

## 2021-01-01 RX ADMIN — SODIUM CHLORIDE 10 MILLILITER(S): 9 INJECTION INTRAMUSCULAR; INTRAVENOUS; SUBCUTANEOUS at 07:52

## 2021-01-01 RX ADMIN — VECURONIUM BROMIDE 10 MILLIGRAM(S): 20 INJECTION, POWDER, FOR SOLUTION INTRAVENOUS at 11:59

## 2021-01-01 RX ADMIN — MEROPENEM 100 MILLIGRAM(S): 1 INJECTION INTRAVENOUS at 00:08

## 2021-01-01 RX ADMIN — Medication 650 MILLIGRAM(S): at 18:47

## 2021-01-01 RX ADMIN — Medication 25 MILLIGRAM(S): at 17:18

## 2021-01-01 RX ADMIN — Medication 650 MILLIGRAM(S): at 09:00

## 2021-01-01 RX ADMIN — HUMAN INSULIN 6 UNIT(S): 100 INJECTION, SUSPENSION SUBCUTANEOUS at 17:32

## 2021-01-01 RX ADMIN — HUMAN INSULIN 8 UNIT(S): 100 INJECTION, SUSPENSION SUBCUTANEOUS at 12:02

## 2021-01-01 RX ADMIN — CHLORHEXIDINE GLUCONATE 1 APPLICATION(S): 213 SOLUTION TOPICAL at 05:06

## 2021-01-01 RX ADMIN — Medication 50 GRAM(S): at 03:03

## 2021-01-01 RX ADMIN — DEXMEDETOMIDINE HYDROCHLORIDE IN 0.9% SODIUM CHLORIDE 9.81 MICROGRAM(S)/KG/HR: 4 INJECTION INTRAVENOUS at 08:44

## 2021-01-01 RX ADMIN — PANTOPRAZOLE SODIUM 40 MILLIGRAM(S): 20 TABLET, DELAYED RELEASE ORAL at 17:11

## 2021-01-01 RX ADMIN — CHLORHEXIDINE GLUCONATE 15 MILLILITER(S): 213 SOLUTION TOPICAL at 18:10

## 2021-01-01 RX ADMIN — Medication 25 MILLIGRAM(S): at 18:01

## 2021-01-01 RX ADMIN — MAGNESIUM OXIDE 400 MG ORAL TABLET 400 MILLIGRAM(S): 241.3 TABLET ORAL at 06:05

## 2021-01-01 RX ADMIN — CHLORHEXIDINE GLUCONATE 1 APPLICATION(S): 213 SOLUTION TOPICAL at 14:52

## 2021-01-01 RX ADMIN — ONDANSETRON 8 MILLIGRAM(S): 8 TABLET, FILM COATED ORAL at 05:50

## 2021-01-01 RX ADMIN — HUMAN INSULIN 10 UNIT(S): 100 INJECTION, SUSPENSION SUBCUTANEOUS at 12:58

## 2021-01-01 RX ADMIN — Medication 12.5 MILLIGRAM(S): at 20:45

## 2021-01-01 RX ADMIN — ONDANSETRON 8 MILLIGRAM(S): 8 TABLET, FILM COATED ORAL at 06:12

## 2021-01-01 RX ADMIN — KETAMINE HYDROCHLORIDE 3.93 MG/KG/HR: 100 INJECTION INTRAMUSCULAR; INTRAVENOUS at 16:45

## 2021-01-01 RX ADMIN — Medication 500 MILLIGRAM(S): at 11:03

## 2021-01-01 RX ADMIN — PANTOPRAZOLE SODIUM 40 MILLIGRAM(S): 20 TABLET, DELAYED RELEASE ORAL at 05:42

## 2021-01-01 RX ADMIN — Medication 10 MILLIGRAM(S): at 05:49

## 2021-01-01 RX ADMIN — HYDROMORPHONE HYDROCHLORIDE 0.5 MILLIGRAM(S): 2 INJECTION INTRAMUSCULAR; INTRAVENOUS; SUBCUTANEOUS at 01:32

## 2021-01-01 RX ADMIN — Medication 1: at 11:47

## 2021-01-01 RX ADMIN — ENOXAPARIN SODIUM 40 MILLIGRAM(S): 100 INJECTION SUBCUTANEOUS at 05:15

## 2021-01-01 RX ADMIN — Medication 81 MILLIGRAM(S): at 13:25

## 2021-01-01 RX ADMIN — AMIODARONE HYDROCHLORIDE 200 MILLIGRAM(S): 400 TABLET ORAL at 06:00

## 2021-01-01 RX ADMIN — Medication 20 MILLIEQUIVALENT(S): at 18:02

## 2021-01-01 RX ADMIN — Medication 25 MILLIGRAM(S): at 05:16

## 2021-01-01 RX ADMIN — CEFEPIME 100 MILLIGRAM(S): 1 INJECTION, POWDER, FOR SOLUTION INTRAMUSCULAR; INTRAVENOUS at 21:24

## 2021-01-01 RX ADMIN — Medication 50 MILLIEQUIVALENT(S): at 18:21

## 2021-01-01 RX ADMIN — PANTOPRAZOLE SODIUM 40 MILLIGRAM(S): 20 TABLET, DELAYED RELEASE ORAL at 05:07

## 2021-01-01 RX ADMIN — Medication 125 MILLIGRAM(S): at 21:00

## 2021-01-01 RX ADMIN — SCOPALAMINE 1 PATCH: 1 PATCH, EXTENDED RELEASE TRANSDERMAL at 10:30

## 2021-01-01 RX ADMIN — Medication 20 MILLIEQUIVALENT(S): at 12:35

## 2021-01-01 RX ADMIN — Medication 10 MILLIGRAM(S): at 14:39

## 2021-01-01 RX ADMIN — Medication 10 MILLIGRAM(S): at 22:37

## 2021-01-01 RX ADMIN — Medication 0.5 MILLIGRAM(S): at 13:00

## 2021-01-01 RX ADMIN — SODIUM CHLORIDE 100 MILLILITER(S): 9 INJECTION INTRAMUSCULAR; INTRAVENOUS; SUBCUTANEOUS at 13:44

## 2021-01-01 RX ADMIN — HYDROMORPHONE HYDROCHLORIDE 1 MILLIGRAM(S): 2 INJECTION INTRAMUSCULAR; INTRAVENOUS; SUBCUTANEOUS at 11:15

## 2021-01-01 RX ADMIN — Medication 10 MILLIGRAM(S): at 21:57

## 2021-01-01 RX ADMIN — MEROPENEM 100 MILLIGRAM(S): 1 INJECTION INTRAVENOUS at 06:37

## 2021-01-01 RX ADMIN — Medication 25 MILLIGRAM(S): at 17:47

## 2021-01-01 RX ADMIN — Medication 50 MILLIEQUIVALENT(S): at 08:54

## 2021-01-01 RX ADMIN — ONDANSETRON 8 MILLIGRAM(S): 8 TABLET, FILM COATED ORAL at 22:26

## 2021-01-01 RX ADMIN — Medication 1 MILLIGRAM(S): at 05:07

## 2021-01-01 RX ADMIN — PROPOFOL 9.42 MICROGRAM(S)/KG/MIN: 10 INJECTION, EMULSION INTRAVENOUS at 21:01

## 2021-01-01 RX ADMIN — HYDROMORPHONE HYDROCHLORIDE 0.5 MILLIGRAM(S): 2 INJECTION INTRAMUSCULAR; INTRAVENOUS; SUBCUTANEOUS at 13:00

## 2021-01-01 RX ADMIN — Medication 50 GRAM(S): at 10:18

## 2021-01-01 RX ADMIN — SERTRALINE 100 MILLIGRAM(S): 25 TABLET, FILM COATED ORAL at 13:34

## 2021-01-01 RX ADMIN — MIRTAZAPINE 7.5 MILLIGRAM(S): 45 TABLET, ORALLY DISINTEGRATING ORAL at 23:17

## 2021-01-01 RX ADMIN — Medication 125 MILLIGRAM(S): at 17:32

## 2021-01-01 RX ADMIN — Medication 125 MILLILITER(S): at 16:56

## 2021-01-01 RX ADMIN — AMIODARONE HYDROCHLORIDE 200 MILLIGRAM(S): 400 TABLET ORAL at 05:15

## 2021-01-01 RX ADMIN — SPIRONOLACTONE 25 MILLIGRAM(S): 25 TABLET, FILM COATED ORAL at 08:29

## 2021-01-01 RX ADMIN — Medication 25 MILLIGRAM(S): at 07:01

## 2021-01-01 RX ADMIN — Medication 1: at 12:53

## 2021-01-01 RX ADMIN — Medication 50 MILLIEQUIVALENT(S): at 04:38

## 2021-01-01 RX ADMIN — Medication 6 MILLIGRAM(S): at 06:51

## 2021-01-01 RX ADMIN — Medication 1 TABLET(S): at 12:56

## 2021-01-01 RX ADMIN — Medication 62.5 MILLIMOLE(S): at 03:35

## 2021-01-01 RX ADMIN — Medication 62.5 MILLIMOLE(S): at 08:13

## 2021-01-01 RX ADMIN — Medication 81 MILLIGRAM(S): at 13:33

## 2021-01-01 RX ADMIN — PANTOPRAZOLE SODIUM 40 MILLIGRAM(S): 20 TABLET, DELAYED RELEASE ORAL at 06:22

## 2021-01-01 RX ADMIN — Medication 650 MILLIGRAM(S): at 19:37

## 2021-01-01 RX ADMIN — Medication 10 MILLIGRAM(S): at 21:43

## 2021-01-01 RX ADMIN — Medication 10 MILLIGRAM(S): at 14:50

## 2021-01-01 RX ADMIN — Medication 1 TABLET(S): at 17:44

## 2021-01-01 RX ADMIN — HYDROMORPHONE HYDROCHLORIDE 1 MILLIGRAM(S): 2 INJECTION INTRAMUSCULAR; INTRAVENOUS; SUBCUTANEOUS at 10:35

## 2021-01-01 RX ADMIN — Medication 100 MILLIGRAM(S): at 23:15

## 2021-01-01 RX ADMIN — Medication 250 MILLIGRAM(S): at 05:36

## 2021-01-01 RX ADMIN — Medication 125 MILLILITER(S): at 17:58

## 2021-01-01 RX ADMIN — SODIUM CHLORIDE 40 MILLILITER(S): 9 INJECTION, SOLUTION INTRAVENOUS at 08:00

## 2021-01-01 RX ADMIN — SERTRALINE 100 MILLIGRAM(S): 25 TABLET, FILM COATED ORAL at 11:18

## 2021-01-01 RX ADMIN — Medication 50 MILLIEQUIVALENT(S): at 15:27

## 2021-01-01 RX ADMIN — Medication 125 MILLIGRAM(S): at 17:47

## 2021-01-01 RX ADMIN — SPIRONOLACTONE 25 MILLIGRAM(S): 25 TABLET, FILM COATED ORAL at 13:51

## 2021-01-01 RX ADMIN — CEFEPIME 100 MILLIGRAM(S): 1 INJECTION, POWDER, FOR SOLUTION INTRAMUSCULAR; INTRAVENOUS at 05:29

## 2021-01-01 RX ADMIN — SERTRALINE 100 MILLIGRAM(S): 25 TABLET, FILM COATED ORAL at 12:01

## 2021-01-01 RX ADMIN — Medication 125 MILLIGRAM(S): at 05:19

## 2021-01-01 RX ADMIN — CHLORHEXIDINE GLUCONATE 1 APPLICATION(S): 213 SOLUTION TOPICAL at 06:55

## 2021-01-01 RX ADMIN — HEPARIN SODIUM 5000 UNIT(S): 5000 INJECTION INTRAVENOUS; SUBCUTANEOUS at 21:47

## 2021-01-01 RX ADMIN — ONDANSETRON 8 MILLIGRAM(S): 8 TABLET, FILM COATED ORAL at 22:19

## 2021-01-01 RX ADMIN — INSULIN HUMAN 3 UNIT(S)/HR: 100 INJECTION, SOLUTION SUBCUTANEOUS at 08:45

## 2021-01-01 RX ADMIN — FENTANYL CITRATE 50 MICROGRAM(S): 50 INJECTION INTRAVENOUS at 15:00

## 2021-01-01 RX ADMIN — DEXMEDETOMIDINE HYDROCHLORIDE IN 0.9% SODIUM CHLORIDE 9.81 MICROGRAM(S)/KG/HR: 4 INJECTION INTRAVENOUS at 11:04

## 2021-01-01 RX ADMIN — Medication 10 MILLIGRAM(S): at 21:13

## 2021-01-01 RX ADMIN — PANTOPRAZOLE SODIUM 40 MILLIGRAM(S): 20 TABLET, DELAYED RELEASE ORAL at 16:24

## 2021-01-01 RX ADMIN — Medication 125 MILLILITER(S): at 14:36

## 2021-01-01 RX ADMIN — Medication 125 MILLIGRAM(S): at 00:46

## 2021-01-01 RX ADMIN — Medication 125 MILLIGRAM(S): at 05:24

## 2021-01-01 RX ADMIN — Medication 2.5 MILLIGRAM(S): at 23:20

## 2021-01-01 RX ADMIN — CEFEPIME 100 MILLIGRAM(S): 1 INJECTION, POWDER, FOR SOLUTION INTRAMUSCULAR; INTRAVENOUS at 08:41

## 2021-01-01 RX ADMIN — Medication 25 MILLIGRAM(S): at 22:37

## 2021-01-01 RX ADMIN — Medication 125 MILLILITER(S): at 04:30

## 2021-01-01 RX ADMIN — SODIUM CHLORIDE 50 MILLILITER(S): 9 INJECTION, SOLUTION INTRAVENOUS at 17:28

## 2021-01-01 RX ADMIN — WARFARIN SODIUM 3 MILLIGRAM(S): 2.5 TABLET ORAL at 21:21

## 2021-01-01 RX ADMIN — Medication 0.5 MILLIGRAM(S): at 21:36

## 2021-01-01 RX ADMIN — ENOXAPARIN SODIUM 60 MILLIGRAM(S): 100 INJECTION SUBCUTANEOUS at 20:55

## 2021-01-01 RX ADMIN — CEFEPIME 100 MILLIGRAM(S): 1 INJECTION, POWDER, FOR SOLUTION INTRAMUSCULAR; INTRAVENOUS at 02:50

## 2021-01-01 RX ADMIN — SERTRALINE 100 MILLIGRAM(S): 25 TABLET, FILM COATED ORAL at 12:14

## 2021-01-01 RX ADMIN — SERTRALINE 100 MILLIGRAM(S): 25 TABLET, FILM COATED ORAL at 12:58

## 2021-01-01 RX ADMIN — SODIUM CHLORIDE 3 MILLILITER(S): 9 INJECTION INTRAMUSCULAR; INTRAVENOUS; SUBCUTANEOUS at 14:52

## 2021-01-01 RX ADMIN — Medication 50 MILLIGRAM(S): at 05:37

## 2021-01-01 RX ADMIN — REMDESIVIR 500 MILLIGRAM(S): 5 INJECTION INTRAVENOUS at 17:01

## 2021-01-01 RX ADMIN — SERTRALINE 100 MILLIGRAM(S): 25 TABLET, FILM COATED ORAL at 12:33

## 2021-01-01 RX ADMIN — Medication 50 MILLIGRAM(S): at 05:06

## 2021-01-01 RX ADMIN — CEFEPIME 100 MILLIGRAM(S): 1 INJECTION, POWDER, FOR SOLUTION INTRAMUSCULAR; INTRAVENOUS at 05:58

## 2021-01-01 RX ADMIN — SODIUM CHLORIDE 1000 MILLILITER(S): 9 INJECTION, SOLUTION INTRAVENOUS at 19:13

## 2021-01-01 RX ADMIN — Medication 2: at 00:07

## 2021-01-01 RX ADMIN — HUMAN INSULIN 5 UNIT(S): 100 INJECTION, SUSPENSION SUBCUTANEOUS at 12:40

## 2021-01-01 RX ADMIN — Medication 50 MILLIEQUIVALENT(S): at 04:21

## 2021-01-01 RX ADMIN — HEPARIN SODIUM 5000 UNIT(S): 5000 INJECTION INTRAVENOUS; SUBCUTANEOUS at 21:53

## 2021-01-01 RX ADMIN — MIRTAZAPINE 7.5 MILLIGRAM(S): 45 TABLET, ORALLY DISINTEGRATING ORAL at 21:06

## 2021-01-01 RX ADMIN — Medication 12.5 MILLIGRAM(S): at 15:00

## 2021-01-01 RX ADMIN — Medication 81 MILLIGRAM(S): at 12:23

## 2021-01-01 RX ADMIN — Medication 1: at 23:20

## 2021-01-01 RX ADMIN — CHLORHEXIDINE GLUCONATE 1 APPLICATION(S): 213 SOLUTION TOPICAL at 09:47

## 2021-01-01 RX ADMIN — Medication 1 TABLET(S): at 13:50

## 2021-01-01 RX ADMIN — Medication 50 GRAM(S): at 02:58

## 2021-01-01 RX ADMIN — LIDOCAINE 1 PATCH: 4 CREAM TOPICAL at 22:21

## 2021-01-01 RX ADMIN — Medication 10 MILLIGRAM(S): at 15:18

## 2021-01-01 RX ADMIN — CHLORHEXIDINE GLUCONATE 15 MILLILITER(S): 213 SOLUTION TOPICAL at 02:02

## 2021-01-01 RX ADMIN — PANTOPRAZOLE SODIUM 40 MILLIGRAM(S): 20 TABLET, DELAYED RELEASE ORAL at 06:28

## 2021-01-01 RX ADMIN — Medication 250 MILLIGRAM(S): at 17:37

## 2021-01-01 RX ADMIN — SODIUM CHLORIDE 30 MILLILITER(S): 9 INJECTION, SOLUTION INTRAVENOUS at 08:14

## 2021-01-01 RX ADMIN — Medication 50 GRAM(S): at 02:36

## 2021-01-01 RX ADMIN — Medication 10 MILLIGRAM(S): at 15:10

## 2021-01-01 RX ADMIN — SODIUM CHLORIDE 3 MILLILITER(S): 9 INJECTION INTRAMUSCULAR; INTRAVENOUS; SUBCUTANEOUS at 06:54

## 2021-01-01 RX ADMIN — Medication 81 MILLIGRAM(S): at 12:02

## 2021-01-01 RX ADMIN — Medication 10 MILLIGRAM(S): at 07:15

## 2021-01-01 RX ADMIN — FENTANYL CITRATE 100 MICROGRAM(S): 50 INJECTION INTRAVENOUS at 18:35

## 2021-01-01 RX ADMIN — Medication 125 MILLIGRAM(S): at 05:03

## 2021-01-01 RX ADMIN — Medication 25 MILLIGRAM(S): at 17:56

## 2021-01-01 RX ADMIN — MIRTAZAPINE 7.5 MILLIGRAM(S): 45 TABLET, ORALLY DISINTEGRATING ORAL at 12:17

## 2021-01-01 RX ADMIN — Medication 15 GRAM(S): at 11:39

## 2021-01-01 RX ADMIN — Medication 100 MILLIGRAM(S): at 12:18

## 2021-01-01 RX ADMIN — Medication 81 MILLIGRAM(S): at 12:58

## 2021-01-01 RX ADMIN — PANTOPRAZOLE SODIUM 40 MILLIGRAM(S): 20 TABLET, DELAYED RELEASE ORAL at 06:27

## 2021-01-01 RX ADMIN — MEROPENEM 100 MILLIGRAM(S): 1 INJECTION INTRAVENOUS at 11:54

## 2021-01-01 RX ADMIN — SODIUM CHLORIDE 3 MILLILITER(S): 9 INJECTION INTRAMUSCULAR; INTRAVENOUS; SUBCUTANEOUS at 21:30

## 2021-01-01 RX ADMIN — Medication 5 MILLIGRAM(S): at 05:00

## 2021-01-01 RX ADMIN — Medication 650 MILLIGRAM(S): at 09:32

## 2021-01-01 RX ADMIN — Medication 1: at 16:48

## 2021-01-01 RX ADMIN — OCTREOTIDE ACETATE 50 MICROGRAM(S): 200 INJECTION, SOLUTION INTRAVENOUS; SUBCUTANEOUS at 05:13

## 2021-01-01 RX ADMIN — Medication 1 TABLET(S): at 09:57

## 2021-01-01 RX ADMIN — Medication 62.5 MILLIMOLE(S): at 04:20

## 2021-01-01 RX ADMIN — Medication 5 MILLIGRAM(S): at 17:16

## 2021-01-01 RX ADMIN — Medication 10 MILLIGRAM(S): at 05:40

## 2021-01-01 RX ADMIN — Medication 2: at 18:11

## 2021-01-01 RX ADMIN — Medication 50 MILLIEQUIVALENT(S): at 01:05

## 2021-01-01 RX ADMIN — Medication 500 MILLIGRAM(S): at 05:39

## 2021-01-01 RX ADMIN — Medication 125 MILLILITER(S): at 21:53

## 2021-01-01 RX ADMIN — SODIUM CHLORIDE 3 MILLILITER(S): 9 INJECTION INTRAMUSCULAR; INTRAVENOUS; SUBCUTANEOUS at 06:01

## 2021-01-01 RX ADMIN — CHLORHEXIDINE GLUCONATE 1 APPLICATION(S): 213 SOLUTION TOPICAL at 07:46

## 2021-01-01 RX ADMIN — MIRTAZAPINE 7.5 MILLIGRAM(S): 45 TABLET, ORALLY DISINTEGRATING ORAL at 12:03

## 2021-01-01 RX ADMIN — SODIUM CHLORIDE 3 MILLILITER(S): 9 INJECTION INTRAMUSCULAR; INTRAVENOUS; SUBCUTANEOUS at 22:37

## 2021-01-01 RX ADMIN — Medication 100 GRAM(S): at 02:39

## 2021-01-01 RX ADMIN — CEFEPIME 100 MILLIGRAM(S): 1 INJECTION, POWDER, FOR SOLUTION INTRAMUSCULAR; INTRAVENOUS at 06:15

## 2021-01-01 RX ADMIN — HEPARIN SODIUM 11.5 UNIT(S)/HR: 5000 INJECTION INTRAVENOUS; SUBCUTANEOUS at 02:31

## 2021-01-01 RX ADMIN — MAGNESIUM OXIDE 400 MG ORAL TABLET 400 MILLIGRAM(S): 241.3 TABLET ORAL at 06:18

## 2021-01-01 RX ADMIN — Medication 1 TABLET(S): at 11:34

## 2021-01-01 RX ADMIN — CHLORHEXIDINE GLUCONATE 1 APPLICATION(S): 213 SOLUTION TOPICAL at 07:04

## 2021-01-01 RX ADMIN — Medication 500 MILLIGRAM(S): at 17:12

## 2021-01-01 RX ADMIN — DEXMEDETOMIDINE HYDROCHLORIDE IN 0.9% SODIUM CHLORIDE 9.81 MICROGRAM(S)/KG/HR: 4 INJECTION INTRAVENOUS at 10:12

## 2021-01-01 RX ADMIN — KETAMINE HYDROCHLORIDE 3.93 MG/KG/HR: 100 INJECTION INTRAMUSCULAR; INTRAVENOUS at 00:33

## 2021-01-01 RX ADMIN — MIRTAZAPINE 7.5 MILLIGRAM(S): 45 TABLET, ORALLY DISINTEGRATING ORAL at 22:42

## 2021-01-01 RX ADMIN — Medication 1 PACKET(S): at 12:13

## 2021-01-01 RX ADMIN — Medication 650 MILLIGRAM(S): at 08:49

## 2021-01-01 RX ADMIN — PANTOPRAZOLE SODIUM 40 MILLIGRAM(S): 20 TABLET, DELAYED RELEASE ORAL at 17:15

## 2021-01-01 RX ADMIN — Medication 500 MILLIGRAM(S): at 06:01

## 2021-01-01 RX ADMIN — Medication 5 MILLIGRAM(S): at 14:08

## 2021-01-01 RX ADMIN — MEROPENEM 100 MILLIGRAM(S): 1 INJECTION INTRAVENOUS at 22:18

## 2021-01-01 RX ADMIN — Medication 500 MILLIGRAM(S): at 17:17

## 2021-01-01 RX ADMIN — CHLORHEXIDINE GLUCONATE 15 MILLILITER(S): 213 SOLUTION TOPICAL at 05:25

## 2021-01-01 RX ADMIN — Medication 50 MILLIEQUIVALENT(S): at 22:00

## 2021-01-01 RX ADMIN — Medication 20 MILLIGRAM(S): at 13:09

## 2021-01-01 RX ADMIN — Medication 500 MICROGRAM(S): at 11:42

## 2021-01-01 RX ADMIN — VASOPRESSIN 4 UNIT(S)/MIN: 20 INJECTION INTRAVENOUS at 21:53

## 2021-01-01 RX ADMIN — Medication 1: at 13:49

## 2021-01-01 RX ADMIN — CEFEPIME 100 MILLIGRAM(S): 1 INJECTION, POWDER, FOR SOLUTION INTRAMUSCULAR; INTRAVENOUS at 13:15

## 2021-01-01 RX ADMIN — Medication 10 MILLIGRAM(S): at 21:42

## 2021-01-01 RX ADMIN — ONDANSETRON 8 MILLIGRAM(S): 8 TABLET, FILM COATED ORAL at 05:47

## 2021-01-01 RX ADMIN — DEXMEDETOMIDINE HYDROCHLORIDE IN 0.9% SODIUM CHLORIDE 9.81 MICROGRAM(S)/KG/HR: 4 INJECTION INTRAVENOUS at 08:03

## 2021-01-01 RX ADMIN — Medication 1: at 12:59

## 2021-01-01 RX ADMIN — Medication 10 MILLIGRAM(S): at 13:45

## 2021-01-01 RX ADMIN — Medication 20 MILLIGRAM(S): at 12:46

## 2021-01-01 RX ADMIN — Medication 10 MILLIGRAM(S): at 21:19

## 2021-01-01 RX ADMIN — Medication 10 MILLIGRAM(S): at 05:15

## 2021-01-01 RX ADMIN — Medication 50 MILLIEQUIVALENT(S): at 03:00

## 2021-01-01 RX ADMIN — Medication 318.75 MILLIGRAM(S): at 12:47

## 2021-01-01 RX ADMIN — Medication 50 GRAM(S): at 04:04

## 2021-01-01 RX ADMIN — HYDROMORPHONE HYDROCHLORIDE 0.5 MILLIGRAM(S): 2 INJECTION INTRAMUSCULAR; INTRAVENOUS; SUBCUTANEOUS at 18:15

## 2021-01-01 RX ADMIN — Medication 318.75 MILLIGRAM(S): at 01:27

## 2021-01-01 RX ADMIN — Medication 650 MILLIGRAM(S): at 18:00

## 2021-01-01 RX ADMIN — KETAMINE HYDROCHLORIDE 3.93 MG/KG/HR: 100 INJECTION INTRAMUSCULAR; INTRAVENOUS at 10:12

## 2021-01-01 RX ADMIN — CHLORHEXIDINE GLUCONATE 1 APPLICATION(S): 213 SOLUTION TOPICAL at 06:36

## 2021-01-01 RX ADMIN — Medication 2: at 05:37

## 2021-01-01 RX ADMIN — Medication 0.5 MILLIGRAM(S): at 14:00

## 2021-01-01 RX ADMIN — CHLORHEXIDINE GLUCONATE 1 APPLICATION(S): 213 SOLUTION TOPICAL at 06:40

## 2021-01-01 RX ADMIN — Medication 50 MILLIEQUIVALENT(S): at 07:13

## 2021-01-01 RX ADMIN — Medication 500 MILLIGRAM(S): at 00:18

## 2021-01-01 RX ADMIN — HEPARIN SODIUM 5000 UNIT(S): 5000 INJECTION INTRAVENOUS; SUBCUTANEOUS at 22:00

## 2021-01-01 RX ADMIN — FENTANYL CITRATE 25 MICROGRAM(S): 50 INJECTION INTRAVENOUS at 08:10

## 2021-01-01 RX ADMIN — Medication 250 MILLIGRAM(S): at 05:52

## 2021-01-01 RX ADMIN — PIPERACILLIN AND TAZOBACTAM 25 GRAM(S): 4; .5 INJECTION, POWDER, LYOPHILIZED, FOR SOLUTION INTRAVENOUS at 02:01

## 2021-01-01 RX ADMIN — Medication 100 GRAM(S): at 15:05

## 2021-01-01 RX ADMIN — SODIUM CHLORIDE 3 MILLILITER(S): 9 INJECTION INTRAMUSCULAR; INTRAVENOUS; SUBCUTANEOUS at 21:38

## 2021-01-01 RX ADMIN — CLOPIDOGREL BISULFATE 75 MILLIGRAM(S): 75 TABLET, FILM COATED ORAL at 12:12

## 2021-01-01 RX ADMIN — CEFEPIME 100 MILLIGRAM(S): 1 INJECTION, POWDER, FOR SOLUTION INTRAMUSCULAR; INTRAVENOUS at 12:53

## 2021-01-01 RX ADMIN — Medication 250 MILLIGRAM(S): at 18:45

## 2021-01-01 RX ADMIN — Medication 25 MILLIGRAM(S): at 06:46

## 2021-01-01 RX ADMIN — SERTRALINE 100 MILLIGRAM(S): 25 TABLET, FILM COATED ORAL at 14:37

## 2021-01-01 RX ADMIN — Medication 10 MILLIGRAM(S): at 21:38

## 2021-01-01 RX ADMIN — Medication 50 MILLIEQUIVALENT(S): at 09:03

## 2021-01-01 RX ADMIN — Medication 4: at 12:01

## 2021-01-01 RX ADMIN — Medication 500 MILLIGRAM(S): at 18:11

## 2021-01-01 RX ADMIN — Medication 1: at 17:11

## 2021-01-01 RX ADMIN — SIMETHICONE 80 MILLIGRAM(S): 80 TABLET, CHEWABLE ORAL at 20:49

## 2021-01-01 RX ADMIN — Medication 20 MILLIEQUIVALENT(S): at 03:06

## 2021-01-01 RX ADMIN — Medication 0.5 MILLIGRAM(S): at 15:33

## 2021-01-01 RX ADMIN — Medication 250 MILLIGRAM(S): at 02:58

## 2021-01-01 RX ADMIN — HEPARIN SODIUM 5000 UNIT(S): 5000 INJECTION INTRAVENOUS; SUBCUTANEOUS at 14:04

## 2021-01-01 RX ADMIN — Medication 500 MILLIGRAM(S): at 17:00

## 2021-01-01 RX ADMIN — SPIRONOLACTONE 12.5 MILLIGRAM(S): 25 TABLET, FILM COATED ORAL at 05:04

## 2021-01-01 RX ADMIN — Medication 50 MILLIEQUIVALENT(S): at 06:29

## 2021-01-01 RX ADMIN — HYDROMORPHONE HYDROCHLORIDE 0.5 MILLIGRAM(S): 2 INJECTION INTRAMUSCULAR; INTRAVENOUS; SUBCUTANEOUS at 15:45

## 2021-01-01 RX ADMIN — HUMAN INSULIN 6 UNIT(S): 100 INJECTION, SUSPENSION SUBCUTANEOUS at 17:34

## 2021-01-01 RX ADMIN — Medication 50 GRAM(S): at 23:54

## 2021-01-01 RX ADMIN — Medication 100 MILLIGRAM(S): at 11:09

## 2021-01-01 RX ADMIN — Medication 650 MILLIGRAM(S): at 13:45

## 2021-01-01 RX ADMIN — Medication 1 MILLIGRAM(S): at 05:14

## 2021-01-01 RX ADMIN — CHLORHEXIDINE GLUCONATE 1 APPLICATION(S): 213 SOLUTION TOPICAL at 06:23

## 2021-01-01 RX ADMIN — OCTREOTIDE ACETATE 50 MICROGRAM(S): 200 INJECTION, SOLUTION INTRAVENOUS; SUBCUTANEOUS at 14:00

## 2021-01-01 RX ADMIN — FENTANYL CITRATE 12.5 MICROGRAM(S): 50 INJECTION INTRAVENOUS at 16:00

## 2021-01-01 RX ADMIN — HEPARIN SODIUM 5000 UNIT(S): 5000 INJECTION INTRAVENOUS; SUBCUTANEOUS at 21:29

## 2021-01-01 RX ADMIN — CHLORHEXIDINE GLUCONATE 15 MILLILITER(S): 213 SOLUTION TOPICAL at 05:00

## 2021-01-01 RX ADMIN — OCTREOTIDE ACETATE 50 MICROGRAM(S): 200 INJECTION, SOLUTION INTRAVENOUS; SUBCUTANEOUS at 05:04

## 2021-01-01 RX ADMIN — HEPARIN SODIUM 5000 UNIT(S): 5000 INJECTION INTRAVENOUS; SUBCUTANEOUS at 22:24

## 2021-01-01 RX ADMIN — PANTOPRAZOLE SODIUM 40 MILLIGRAM(S): 20 TABLET, DELAYED RELEASE ORAL at 17:39

## 2021-01-01 RX ADMIN — Medication 1: at 18:40

## 2021-01-01 RX ADMIN — DEXMEDETOMIDINE HYDROCHLORIDE IN 0.9% SODIUM CHLORIDE 23.1 MICROGRAM(S)/KG/HR: 4 INJECTION INTRAVENOUS at 21:06

## 2021-01-01 RX ADMIN — Medication 650 MILLIGRAM(S): at 07:30

## 2021-01-01 RX ADMIN — Medication 250 MILLIGRAM(S): at 17:29

## 2021-01-01 RX ADMIN — Medication 1000 MILLIGRAM(S): at 04:30

## 2021-01-01 RX ADMIN — Medication 50 MILLIGRAM(S): at 05:05

## 2021-01-01 RX ADMIN — Medication 125 MILLILITER(S): at 22:44

## 2021-01-01 RX ADMIN — ENOXAPARIN SODIUM 30 MILLIGRAM(S): 100 INJECTION SUBCUTANEOUS at 11:46

## 2021-01-01 RX ADMIN — ONDANSETRON 4 MILLIGRAM(S): 8 TABLET, FILM COATED ORAL at 10:00

## 2021-01-01 RX ADMIN — Medication 50 GRAM(S): at 04:12

## 2021-01-01 RX ADMIN — HEPARIN SODIUM 5000 UNIT(S): 5000 INJECTION INTRAVENOUS; SUBCUTANEOUS at 05:08

## 2021-01-01 RX ADMIN — Medication 1 TABLET(S): at 05:47

## 2021-01-01 RX ADMIN — MEROPENEM 100 MILLIGRAM(S): 1 INJECTION INTRAVENOUS at 13:22

## 2021-01-01 RX ADMIN — Medication 81 MILLIGRAM(S): at 11:28

## 2021-01-01 RX ADMIN — SODIUM CHLORIDE 50 MILLILITER(S): 9 INJECTION, SOLUTION INTRAVENOUS at 10:05

## 2021-01-01 RX ADMIN — HYDROMORPHONE HYDROCHLORIDE 1 MILLIGRAM(S): 2 INJECTION INTRAMUSCULAR; INTRAVENOUS; SUBCUTANEOUS at 08:18

## 2021-01-01 RX ADMIN — Medication 1: at 00:04

## 2021-01-01 RX ADMIN — MAGNESIUM OXIDE 400 MG ORAL TABLET 400 MILLIGRAM(S): 241.3 TABLET ORAL at 17:51

## 2021-01-01 RX ADMIN — HEPARIN SODIUM 5000 UNIT(S): 5000 INJECTION INTRAVENOUS; SUBCUTANEOUS at 13:18

## 2021-01-01 RX ADMIN — Medication 20 MILLIGRAM(S): at 09:56

## 2021-01-01 RX ADMIN — Medication 62.5 MILLIMOLE(S): at 06:38

## 2021-01-01 RX ADMIN — Medication 500 MILLIGRAM(S): at 17:07

## 2021-01-01 RX ADMIN — CHLORHEXIDINE GLUCONATE 1 APPLICATION(S): 213 SOLUTION TOPICAL at 07:37

## 2021-01-01 RX ADMIN — Medication 25 GRAM(S): at 05:45

## 2021-01-01 RX ADMIN — Medication 50 MILLIEQUIVALENT(S): at 10:07

## 2021-01-01 RX ADMIN — Medication 500 MILLIGRAM(S): at 14:06

## 2021-01-01 RX ADMIN — Medication 2: at 05:05

## 2021-01-01 RX ADMIN — Medication 2: at 12:21

## 2021-01-01 RX ADMIN — PANTOPRAZOLE SODIUM 40 MILLIGRAM(S): 20 TABLET, DELAYED RELEASE ORAL at 18:31

## 2021-01-01 RX ADMIN — CHLORHEXIDINE GLUCONATE 15 MILLILITER(S): 213 SOLUTION TOPICAL at 06:23

## 2021-01-01 RX ADMIN — SODIUM CHLORIDE 3 MILLILITER(S): 9 INJECTION INTRAMUSCULAR; INTRAVENOUS; SUBCUTANEOUS at 21:12

## 2021-01-01 RX ADMIN — Medication 125 MILLIGRAM(S): at 23:07

## 2021-01-01 RX ADMIN — CHLORHEXIDINE GLUCONATE 1 APPLICATION(S): 213 SOLUTION TOPICAL at 10:02

## 2021-01-01 RX ADMIN — HEPARIN SODIUM 5000 UNIT(S): 5000 INJECTION INTRAVENOUS; SUBCUTANEOUS at 05:20

## 2021-01-01 RX ADMIN — Medication 100 MILLIGRAM(S): at 05:39

## 2021-01-01 RX ADMIN — ONDANSETRON 8 MILLIGRAM(S): 8 TABLET, FILM COATED ORAL at 05:14

## 2021-01-01 RX ADMIN — Medication 50 MILLIEQUIVALENT(S): at 08:16

## 2021-01-01 RX ADMIN — PANTOPRAZOLE SODIUM 40 MILLIGRAM(S): 20 TABLET, DELAYED RELEASE ORAL at 06:08

## 2021-01-01 RX ADMIN — SODIUM CHLORIDE 3 MILLILITER(S): 9 INJECTION INTRAMUSCULAR; INTRAVENOUS; SUBCUTANEOUS at 12:57

## 2021-01-01 RX ADMIN — Medication 1: at 23:30

## 2021-01-01 RX ADMIN — MAGNESIUM OXIDE 400 MG ORAL TABLET 400 MILLIGRAM(S): 241.3 TABLET ORAL at 05:54

## 2021-01-01 RX ADMIN — MAGNESIUM OXIDE 400 MG ORAL TABLET 400 MILLIGRAM(S): 241.3 TABLET ORAL at 17:38

## 2021-01-01 RX ADMIN — CHLORHEXIDINE GLUCONATE 15 MILLILITER(S): 213 SOLUTION TOPICAL at 17:56

## 2021-01-01 RX ADMIN — MEROPENEM 100 MILLIGRAM(S): 1 INJECTION INTRAVENOUS at 21:17

## 2021-01-01 RX ADMIN — Medication 125 MILLIGRAM(S): at 11:00

## 2021-01-01 RX ADMIN — Medication 50 MILLIEQUIVALENT(S): at 22:48

## 2021-01-01 RX ADMIN — Medication 10 MILLIGRAM(S): at 21:40

## 2021-01-01 RX ADMIN — Medication 1 TABLET(S): at 13:02

## 2021-01-01 RX ADMIN — SPIRONOLACTONE 12.5 MILLIGRAM(S): 25 TABLET, FILM COATED ORAL at 17:41

## 2021-01-01 RX ADMIN — PANTOPRAZOLE SODIUM 40 MILLIGRAM(S): 20 TABLET, DELAYED RELEASE ORAL at 06:17

## 2021-01-01 RX ADMIN — Medication 100 MILLIGRAM(S): at 06:00

## 2021-01-01 RX ADMIN — SODIUM CHLORIDE 3 MILLILITER(S): 9 INJECTION INTRAMUSCULAR; INTRAVENOUS; SUBCUTANEOUS at 22:12

## 2021-01-01 RX ADMIN — CEFEPIME 100 MILLIGRAM(S): 1 INJECTION, POWDER, FOR SOLUTION INTRAMUSCULAR; INTRAVENOUS at 10:13

## 2021-01-01 RX ADMIN — Medication 5.89 MICROGRAM(S)/KG/MIN: at 05:37

## 2021-01-01 RX ADMIN — Medication 250 MILLIGRAM(S): at 05:34

## 2021-01-01 RX ADMIN — ENOXAPARIN SODIUM 40 MILLIGRAM(S): 100 INJECTION SUBCUTANEOUS at 17:13

## 2021-01-01 RX ADMIN — SERTRALINE 100 MILLIGRAM(S): 25 TABLET, FILM COATED ORAL at 12:51

## 2021-01-01 RX ADMIN — Medication 100 MILLIGRAM(S): at 12:07

## 2021-01-01 RX ADMIN — SPIRONOLACTONE 25 MILLIGRAM(S): 25 TABLET, FILM COATED ORAL at 05:31

## 2021-01-01 RX ADMIN — Medication 10 MILLIGRAM(S): at 13:47

## 2021-01-01 RX ADMIN — Medication 10 MILLIGRAM(S): at 14:59

## 2021-01-01 RX ADMIN — Medication 81 MILLIGRAM(S): at 12:26

## 2021-01-01 RX ADMIN — MEROPENEM 100 MILLIGRAM(S): 1 INJECTION INTRAVENOUS at 13:32

## 2021-01-01 RX ADMIN — Medication 2: at 17:26

## 2021-01-01 RX ADMIN — HUMAN INSULIN 6 UNIT(S): 100 INJECTION, SUSPENSION SUBCUTANEOUS at 11:43

## 2021-01-01 RX ADMIN — VASOPRESSIN 4 UNIT(S)/MIN: 20 INJECTION INTRAVENOUS at 21:36

## 2021-01-01 RX ADMIN — Medication 50 GRAM(S): at 09:18

## 2021-01-01 RX ADMIN — SERTRALINE 50 MILLIGRAM(S): 25 TABLET, FILM COATED ORAL at 02:55

## 2021-01-01 RX ADMIN — MIRTAZAPINE 7.5 MILLIGRAM(S): 45 TABLET, ORALLY DISINTEGRATING ORAL at 12:45

## 2021-01-01 RX ADMIN — CEFEPIME 100 MILLIGRAM(S): 1 INJECTION, POWDER, FOR SOLUTION INTRAMUSCULAR; INTRAVENOUS at 21:28

## 2021-01-01 RX ADMIN — Medication 10 MILLIGRAM(S): at 23:52

## 2021-01-01 RX ADMIN — PIPERACILLIN AND TAZOBACTAM 25 GRAM(S): 4; .5 INJECTION, POWDER, LYOPHILIZED, FOR SOLUTION INTRAVENOUS at 18:07

## 2021-01-01 RX ADMIN — Medication 100 MILLIGRAM(S): at 11:55

## 2021-01-01 RX ADMIN — PROPOFOL 9.42 MICROGRAM(S)/KG/MIN: 10 INJECTION, EMULSION INTRAVENOUS at 00:34

## 2021-01-01 RX ADMIN — WARFARIN SODIUM 5 MILLIGRAM(S): 2.5 TABLET ORAL at 21:00

## 2021-01-01 RX ADMIN — SODIUM CHLORIDE 3 MILLILITER(S): 9 INJECTION INTRAMUSCULAR; INTRAVENOUS; SUBCUTANEOUS at 13:41

## 2021-01-01 RX ADMIN — CEFEPIME 100 MILLIGRAM(S): 1 INJECTION, POWDER, FOR SOLUTION INTRAMUSCULAR; INTRAVENOUS at 22:30

## 2021-01-01 RX ADMIN — HEPARIN SODIUM 5000 UNIT(S): 5000 INJECTION INTRAVENOUS; SUBCUTANEOUS at 22:20

## 2021-01-01 RX ADMIN — ONDANSETRON 8 MILLIGRAM(S): 8 TABLET, FILM COATED ORAL at 15:32

## 2021-01-01 RX ADMIN — Medication 81 MILLIGRAM(S): at 11:49

## 2021-01-01 RX ADMIN — Medication 20 MILLIGRAM(S): at 21:55

## 2021-01-01 RX ADMIN — Medication 1 TABLET(S): at 12:04

## 2021-01-01 RX ADMIN — ONDANSETRON 8 MILLIGRAM(S): 8 TABLET, FILM COATED ORAL at 13:01

## 2021-01-01 RX ADMIN — Medication 10 MILLIGRAM(S): at 21:16

## 2021-01-01 RX ADMIN — Medication 1000 MILLIGRAM(S): at 05:35

## 2021-01-01 RX ADMIN — FENTANYL CITRATE 50 MICROGRAM(S): 50 INJECTION INTRAVENOUS at 00:30

## 2021-01-01 RX ADMIN — Medication 100 MILLIGRAM(S): at 05:03

## 2021-01-01 RX ADMIN — MEROPENEM 100 MILLIGRAM(S): 1 INJECTION INTRAVENOUS at 21:05

## 2021-01-01 RX ADMIN — Medication 125 MILLIGRAM(S): at 17:08

## 2021-01-01 RX ADMIN — Medication 10 MILLIGRAM(S): at 06:26

## 2021-01-01 RX ADMIN — CHLORHEXIDINE GLUCONATE 1 APPLICATION(S): 213 SOLUTION TOPICAL at 05:04

## 2021-01-01 RX ADMIN — AMIODARONE HYDROCHLORIDE 200 MILLIGRAM(S): 400 TABLET ORAL at 05:40

## 2021-01-01 RX ADMIN — Medication 100 MILLIEQUIVALENT(S): at 03:26

## 2021-01-01 RX ADMIN — SPIRONOLACTONE 25 MILLIGRAM(S): 25 TABLET, FILM COATED ORAL at 06:30

## 2021-01-01 RX ADMIN — Medication 10 MILLIGRAM(S): at 05:39

## 2021-01-01 RX ADMIN — SODIUM CHLORIDE 3 MILLILITER(S): 9 INJECTION INTRAMUSCULAR; INTRAVENOUS; SUBCUTANEOUS at 23:25

## 2021-01-01 RX ADMIN — Medication 500 MILLIGRAM(S): at 13:21

## 2021-01-01 RX ADMIN — SODIUM CHLORIDE 3 MILLILITER(S): 9 INJECTION INTRAMUSCULAR; INTRAVENOUS; SUBCUTANEOUS at 15:30

## 2021-01-01 RX ADMIN — PROPOFOL 9.42 MICROGRAM(S)/KG/MIN: 10 INJECTION, EMULSION INTRAVENOUS at 08:26

## 2021-01-01 RX ADMIN — Medication 125 MILLILITER(S): at 22:00

## 2021-01-01 RX ADMIN — Medication 100 MILLIGRAM(S): at 12:49

## 2021-01-01 RX ADMIN — Medication 5 MILLIGRAM(S): at 23:00

## 2021-01-01 RX ADMIN — SPIRONOLACTONE 12.5 MILLIGRAM(S): 25 TABLET, FILM COATED ORAL at 05:22

## 2021-01-01 RX ADMIN — Medication 10 MILLIGRAM(S): at 15:28

## 2021-01-01 RX ADMIN — Medication 12.5 MILLIGRAM(S): at 05:50

## 2021-01-01 RX ADMIN — WARFARIN SODIUM 5 MILLIGRAM(S): 2.5 TABLET ORAL at 21:46

## 2021-01-01 RX ADMIN — Medication 650 MILLIGRAM(S): at 06:00

## 2021-01-01 RX ADMIN — Medication 125 MILLIGRAM(S): at 00:59

## 2021-01-01 RX ADMIN — SPIRONOLACTONE 12.5 MILLIGRAM(S): 25 TABLET, FILM COATED ORAL at 17:32

## 2021-01-01 RX ADMIN — Medication 0.5 MILLIGRAM(S): at 21:00

## 2021-01-01 RX ADMIN — MEROPENEM 100 MILLIGRAM(S): 1 INJECTION INTRAVENOUS at 05:31

## 2021-01-01 RX ADMIN — HUMAN INSULIN 6 UNIT(S): 100 INJECTION, SUSPENSION SUBCUTANEOUS at 17:27

## 2021-01-01 RX ADMIN — ONDANSETRON 4 MILLIGRAM(S): 8 TABLET, FILM COATED ORAL at 13:20

## 2021-01-01 RX ADMIN — Medication 1000 MILLIGRAM(S): at 19:00

## 2021-01-01 RX ADMIN — Medication 5 MILLIGRAM(S): at 05:34

## 2021-01-01 RX ADMIN — ONDANSETRON 4 MILLIGRAM(S): 8 TABLET, FILM COATED ORAL at 02:19

## 2021-01-01 RX ADMIN — Medication 1 TABLET(S): at 17:03

## 2021-01-01 RX ADMIN — CEFEPIME 100 MILLIGRAM(S): 1 INJECTION, POWDER, FOR SOLUTION INTRAMUSCULAR; INTRAVENOUS at 14:31

## 2021-01-01 RX ADMIN — SODIUM CHLORIDE 3 MILLILITER(S): 9 INJECTION INTRAMUSCULAR; INTRAVENOUS; SUBCUTANEOUS at 14:02

## 2021-01-01 RX ADMIN — Medication 10 MILLIGRAM(S): at 21:35

## 2021-01-01 RX ADMIN — Medication 2: at 22:15

## 2021-01-01 RX ADMIN — CEFEPIME 100 MILLIGRAM(S): 1 INJECTION, POWDER, FOR SOLUTION INTRAMUSCULAR; INTRAVENOUS at 21:06

## 2021-01-01 RX ADMIN — Medication 50 MILLIEQUIVALENT(S): at 18:26

## 2021-01-01 RX ADMIN — SCOPALAMINE 1 PATCH: 1 PATCH, EXTENDED RELEASE TRANSDERMAL at 19:17

## 2021-01-01 RX ADMIN — Medication 100 MILLIGRAM(S): at 12:08

## 2021-01-01 RX ADMIN — Medication 650 MILLIGRAM(S): at 20:48

## 2021-01-01 RX ADMIN — Medication 25 GRAM(S): at 09:15

## 2021-01-01 RX ADMIN — Medication 50 MILLIGRAM(S): at 21:52

## 2021-01-01 RX ADMIN — Medication 10 MILLIGRAM(S): at 12:53

## 2021-01-01 RX ADMIN — Medication 125 MILLILITER(S): at 05:55

## 2021-01-01 RX ADMIN — Medication 1: at 18:04

## 2021-01-01 RX ADMIN — SERTRALINE 100 MILLIGRAM(S): 25 TABLET, FILM COATED ORAL at 12:54

## 2021-01-01 RX ADMIN — Medication 10 MILLIGRAM(S): at 06:30

## 2021-01-01 RX ADMIN — Medication 10 MILLIGRAM(S): at 14:27

## 2021-01-01 RX ADMIN — PANTOPRAZOLE SODIUM 40 MILLIGRAM(S): 20 TABLET, DELAYED RELEASE ORAL at 18:00

## 2021-01-01 RX ADMIN — POTASSIUM PHOSPHATE, MONOBASIC POTASSIUM PHOSPHATE, DIBASIC 62.5 MILLIMOLE(S): 236; 224 INJECTION, SOLUTION INTRAVENOUS at 03:24

## 2021-01-01 RX ADMIN — CHLORHEXIDINE GLUCONATE 15 MILLILITER(S): 213 SOLUTION TOPICAL at 06:41

## 2021-01-01 RX ADMIN — ONDANSETRON 4 MILLIGRAM(S): 8 TABLET, FILM COATED ORAL at 06:41

## 2021-01-01 RX ADMIN — CHLORHEXIDINE GLUCONATE 15 MILLILITER(S): 213 SOLUTION TOPICAL at 07:43

## 2021-01-01 RX ADMIN — Medication 100 MILLIGRAM(S): at 23:00

## 2021-01-01 RX ADMIN — Medication 50 MILLIGRAM(S): at 21:31

## 2021-01-01 RX ADMIN — CHLORHEXIDINE GLUCONATE 15 MILLILITER(S): 213 SOLUTION TOPICAL at 18:38

## 2021-01-01 RX ADMIN — Medication 7.36 MICROGRAM(S)/KG/MIN: at 21:47

## 2021-01-01 RX ADMIN — Medication 1: at 17:52

## 2021-01-01 RX ADMIN — SODIUM CHLORIDE 3 MILLILITER(S): 9 INJECTION INTRAMUSCULAR; INTRAVENOUS; SUBCUTANEOUS at 06:30

## 2021-01-01 RX ADMIN — Medication 5 MILLIGRAM(S): at 06:29

## 2021-01-01 RX ADMIN — Medication 12.5 MILLIGRAM(S): at 13:26

## 2021-01-01 RX ADMIN — Medication 318.75 MILLIGRAM(S): at 18:32

## 2021-01-01 RX ADMIN — AMIODARONE HYDROCHLORIDE 200 MILLIGRAM(S): 400 TABLET ORAL at 05:26

## 2021-01-01 RX ADMIN — Medication 10 MILLIGRAM(S): at 23:19

## 2021-01-01 RX ADMIN — HEPARIN SODIUM 5000 UNIT(S): 5000 INJECTION INTRAVENOUS; SUBCUTANEOUS at 14:28

## 2021-01-01 RX ADMIN — Medication 1 TABLET(S): at 11:55

## 2021-01-01 RX ADMIN — Medication 125 MILLIGRAM(S): at 10:48

## 2021-01-01 RX ADMIN — HEPARIN SODIUM 10 UNIT(S)/HR: 5000 INJECTION INTRAVENOUS; SUBCUTANEOUS at 11:40

## 2021-01-01 RX ADMIN — Medication 50 MILLIGRAM(S): at 05:12

## 2021-01-01 RX ADMIN — Medication 10 MILLIGRAM(S): at 12:27

## 2021-01-01 RX ADMIN — Medication 125 MILLILITER(S): at 03:07

## 2021-01-01 RX ADMIN — VASOPRESSIN 4 UNIT(S)/MIN: 20 INJECTION INTRAVENOUS at 05:03

## 2021-01-01 RX ADMIN — KETAMINE HYDROCHLORIDE 200 MILLIGRAM(S): 100 INJECTION INTRAMUSCULAR; INTRAVENOUS at 13:05

## 2021-01-01 RX ADMIN — PANTOPRAZOLE SODIUM 40 MILLIGRAM(S): 20 TABLET, DELAYED RELEASE ORAL at 05:24

## 2021-01-01 RX ADMIN — Medication 81 MILLIGRAM(S): at 11:21

## 2021-01-01 RX ADMIN — AMIODARONE HYDROCHLORIDE 200 MILLIGRAM(S): 400 TABLET ORAL at 05:25

## 2021-01-01 RX ADMIN — SODIUM CHLORIDE 3 MILLILITER(S): 9 INJECTION INTRAMUSCULAR; INTRAVENOUS; SUBCUTANEOUS at 11:17

## 2021-01-01 RX ADMIN — CHLORHEXIDINE GLUCONATE 15 MILLILITER(S): 213 SOLUTION TOPICAL at 17:41

## 2021-01-01 RX ADMIN — Medication 1: at 06:40

## 2021-01-01 RX ADMIN — CHLORHEXIDINE GLUCONATE 15 MILLILITER(S): 213 SOLUTION TOPICAL at 06:56

## 2021-01-01 RX ADMIN — SODIUM CHLORIDE 3 MILLILITER(S): 9 INJECTION INTRAMUSCULAR; INTRAVENOUS; SUBCUTANEOUS at 17:14

## 2021-01-01 RX ADMIN — Medication 12.5 MILLIGRAM(S): at 21:51

## 2021-01-01 RX ADMIN — CEFEPIME 100 MILLIGRAM(S): 1 INJECTION, POWDER, FOR SOLUTION INTRAMUSCULAR; INTRAVENOUS at 13:44

## 2021-01-01 RX ADMIN — Medication 300 MILLIGRAM(S): at 17:19

## 2021-01-01 RX ADMIN — Medication 6 MILLIGRAM(S): at 06:16

## 2021-01-01 RX ADMIN — HUMAN INSULIN 12 UNIT(S): 100 INJECTION, SUSPENSION SUBCUTANEOUS at 06:07

## 2021-01-01 RX ADMIN — Medication 1 TABLET(S): at 11:48

## 2021-01-01 RX ADMIN — SERTRALINE 100 MILLIGRAM(S): 25 TABLET, FILM COATED ORAL at 12:39

## 2021-01-01 RX ADMIN — PROPOFOL 9.42 MICROGRAM(S)/KG/MIN: 10 INJECTION, EMULSION INTRAVENOUS at 07:27

## 2021-01-01 RX ADMIN — HEPARIN SODIUM 5000 UNIT(S): 5000 INJECTION INTRAVENOUS; SUBCUTANEOUS at 13:19

## 2021-01-01 RX ADMIN — Medication 1 TABLET(S): at 05:34

## 2021-01-01 RX ADMIN — Medication 1: at 13:19

## 2021-01-01 RX ADMIN — Medication 650 MILLIGRAM(S): at 11:35

## 2021-01-01 RX ADMIN — Medication 25 MILLIGRAM(S): at 13:09

## 2021-01-01 RX ADMIN — HEPARIN SODIUM 12 UNIT(S)/HR: 5000 INJECTION INTRAVENOUS; SUBCUTANEOUS at 05:11

## 2021-01-01 RX ADMIN — Medication 50 MILLIEQUIVALENT(S): at 18:14

## 2021-01-01 RX ADMIN — Medication 0.5 MILLIGRAM(S): at 23:00

## 2021-01-01 RX ADMIN — SODIUM CHLORIDE 3 MILLILITER(S): 9 INJECTION INTRAMUSCULAR; INTRAVENOUS; SUBCUTANEOUS at 22:00

## 2021-01-01 RX ADMIN — Medication 50 MILLIEQUIVALENT(S): at 08:30

## 2021-01-01 RX ADMIN — MEROPENEM 100 MILLIGRAM(S): 1 INJECTION INTRAVENOUS at 21:16

## 2021-01-01 RX ADMIN — AMIODARONE HYDROCHLORIDE 200 MILLIGRAM(S): 400 TABLET ORAL at 11:28

## 2021-01-01 RX ADMIN — HUMAN INSULIN 6 UNIT(S): 100 INJECTION, SUSPENSION SUBCUTANEOUS at 06:03

## 2021-01-01 RX ADMIN — Medication 25 MILLIGRAM(S): at 05:34

## 2021-01-01 RX ADMIN — Medication 20 MILLIGRAM(S): at 12:48

## 2021-01-01 RX ADMIN — MEROPENEM 100 MILLIGRAM(S): 1 INJECTION INTRAVENOUS at 21:49

## 2021-01-01 RX ADMIN — PANTOPRAZOLE SODIUM 40 MILLIGRAM(S): 20 TABLET, DELAYED RELEASE ORAL at 12:22

## 2021-01-01 RX ADMIN — Medication 10 MILLIGRAM(S): at 05:25

## 2021-01-01 RX ADMIN — LACTULOSE 30 GRAM(S): 10 SOLUTION ORAL at 16:41

## 2021-01-01 RX ADMIN — WARFARIN SODIUM 5 MILLIGRAM(S): 2.5 TABLET ORAL at 22:01

## 2021-01-01 RX ADMIN — Medication 1 TABLET(S): at 12:58

## 2021-01-01 RX ADMIN — Medication 650 MILLIGRAM(S): at 12:14

## 2021-01-01 RX ADMIN — SERTRALINE 50 MILLIGRAM(S): 25 TABLET, FILM COATED ORAL at 11:59

## 2021-01-01 RX ADMIN — SPIRONOLACTONE 25 MILLIGRAM(S): 25 TABLET, FILM COATED ORAL at 06:35

## 2021-01-01 RX ADMIN — Medication 10 MILLIGRAM(S): at 22:18

## 2021-01-01 RX ADMIN — PIPERACILLIN AND TAZOBACTAM 25 GRAM(S): 4; .5 INJECTION, POWDER, LYOPHILIZED, FOR SOLUTION INTRAVENOUS at 08:11

## 2021-01-01 RX ADMIN — Medication 3 MILLILITER(S): at 17:14

## 2021-01-01 RX ADMIN — SODIUM CHLORIDE 1 GRAM(S): 9 INJECTION INTRAMUSCULAR; INTRAVENOUS; SUBCUTANEOUS at 13:21

## 2021-01-01 RX ADMIN — Medication 10 MILLIGRAM(S): at 05:31

## 2021-01-01 RX ADMIN — HEPARIN SODIUM 5 UNIT(S)/HR: 5000 INJECTION INTRAVENOUS; SUBCUTANEOUS at 01:56

## 2021-01-01 RX ADMIN — SODIUM CHLORIDE 3 MILLILITER(S): 9 INJECTION INTRAMUSCULAR; INTRAVENOUS; SUBCUTANEOUS at 06:23

## 2021-01-01 RX ADMIN — Medication 100 MILLIGRAM(S): at 12:01

## 2021-01-01 RX ADMIN — Medication 100 MILLIGRAM(S): at 14:18

## 2021-01-01 RX ADMIN — Medication 2: at 05:50

## 2021-01-01 RX ADMIN — SODIUM CHLORIDE 3 MILLILITER(S): 9 INJECTION INTRAMUSCULAR; INTRAVENOUS; SUBCUTANEOUS at 12:47

## 2021-01-01 RX ADMIN — Medication 62.5 MILLIMOLE(S): at 01:59

## 2021-01-01 RX ADMIN — Medication 1 MILLIGRAM(S): at 06:18

## 2021-01-01 RX ADMIN — MIDAZOLAM HYDROCHLORIDE 1 MILLIGRAM(S): 1 INJECTION, SOLUTION INTRAMUSCULAR; INTRAVENOUS at 17:37

## 2021-01-01 RX ADMIN — Medication 10 MILLIGRAM(S): at 22:44

## 2021-01-01 RX ADMIN — MEROPENEM 100 MILLIGRAM(S): 1 INJECTION INTRAVENOUS at 13:09

## 2021-01-01 RX ADMIN — Medication 500 MILLIGRAM(S): at 06:06

## 2021-01-01 RX ADMIN — Medication 25 MILLIGRAM(S): at 21:48

## 2021-01-01 RX ADMIN — Medication 125 MILLIGRAM(S): at 05:06

## 2021-01-01 RX ADMIN — Medication 50 MILLIEQUIVALENT(S): at 17:23

## 2021-01-01 RX ADMIN — Medication 1: at 12:18

## 2021-01-01 RX ADMIN — PANTOPRAZOLE SODIUM 40 MILLIGRAM(S): 20 TABLET, DELAYED RELEASE ORAL at 05:13

## 2021-01-01 RX ADMIN — Medication 500 MILLIGRAM(S): at 05:30

## 2021-01-01 RX ADMIN — Medication 125 MILLIGRAM(S): at 07:16

## 2021-01-01 RX ADMIN — Medication 10 MILLIGRAM(S): at 06:29

## 2021-01-01 RX ADMIN — Medication 10 MILLIGRAM(S): at 22:26

## 2021-01-01 RX ADMIN — Medication 100 MILLIGRAM(S): at 12:13

## 2021-01-01 RX ADMIN — Medication 125 MILLIGRAM(S): at 17:33

## 2021-01-01 RX ADMIN — PANTOPRAZOLE SODIUM 40 MILLIGRAM(S): 20 TABLET, DELAYED RELEASE ORAL at 19:03

## 2021-01-01 RX ADMIN — CEFEPIME 100 MILLIGRAM(S): 1 INJECTION, POWDER, FOR SOLUTION INTRAMUSCULAR; INTRAVENOUS at 11:33

## 2021-01-01 RX ADMIN — Medication 125 MILLIGRAM(S): at 05:07

## 2021-01-01 RX ADMIN — CHLORHEXIDINE GLUCONATE 1 APPLICATION(S): 213 SOLUTION TOPICAL at 05:46

## 2021-01-01 RX ADMIN — CHLORHEXIDINE GLUCONATE 1 APPLICATION(S): 213 SOLUTION TOPICAL at 17:27

## 2021-01-01 RX ADMIN — Medication 100 MILLIGRAM(S): at 00:33

## 2021-01-01 RX ADMIN — Medication 2 MILLIGRAM(S): at 05:19

## 2021-01-01 RX ADMIN — Medication 10 MILLIGRAM(S): at 12:10

## 2021-01-01 RX ADMIN — CHLORHEXIDINE GLUCONATE 15 MILLILITER(S): 213 SOLUTION TOPICAL at 17:25

## 2021-01-01 RX ADMIN — Medication 1: at 18:35

## 2021-01-01 RX ADMIN — Medication 1 TABLET(S): at 17:14

## 2021-01-01 RX ADMIN — PANTOPRAZOLE SODIUM 40 MILLIGRAM(S): 20 TABLET, DELAYED RELEASE ORAL at 08:42

## 2021-01-01 RX ADMIN — Medication 500 MILLIGRAM(S): at 17:03

## 2021-01-01 RX ADMIN — Medication 4: at 12:58

## 2021-01-01 RX ADMIN — MIRTAZAPINE 7.5 MILLIGRAM(S): 45 TABLET, ORALLY DISINTEGRATING ORAL at 11:19

## 2021-01-01 RX ADMIN — Medication 0.5 MILLIGRAM(S): at 23:16

## 2021-01-01 RX ADMIN — MIRTAZAPINE 7.5 MILLIGRAM(S): 45 TABLET, ORALLY DISINTEGRATING ORAL at 13:06

## 2021-01-01 RX ADMIN — HUMAN INSULIN 6 UNIT(S): 100 INJECTION, SUSPENSION SUBCUTANEOUS at 17:53

## 2021-01-01 RX ADMIN — SODIUM CHLORIDE 50 MILLILITER(S): 9 INJECTION INTRAMUSCULAR; INTRAVENOUS; SUBCUTANEOUS at 01:36

## 2021-01-01 RX ADMIN — AMIODARONE HYDROCHLORIDE 200 MILLIGRAM(S): 400 TABLET ORAL at 05:14

## 2021-01-01 RX ADMIN — PANTOPRAZOLE SODIUM 40 MILLIGRAM(S): 20 TABLET, DELAYED RELEASE ORAL at 05:47

## 2021-01-01 RX ADMIN — Medication 25 MILLIGRAM(S): at 14:23

## 2021-01-01 RX ADMIN — Medication 20 MILLIEQUIVALENT(S): at 08:23

## 2021-01-01 RX ADMIN — ONDANSETRON 8 MILLIGRAM(S): 8 TABLET, FILM COATED ORAL at 13:58

## 2021-01-01 RX ADMIN — SIMETHICONE 80 MILLIGRAM(S): 80 TABLET, CHEWABLE ORAL at 13:06

## 2021-01-01 RX ADMIN — Medication 125 MILLIGRAM(S): at 12:16

## 2021-01-01 RX ADMIN — Medication 0.5 MILLIGRAM(S): at 21:24

## 2021-01-01 RX ADMIN — Medication 40 MILLIEQUIVALENT(S): at 15:06

## 2021-01-01 RX ADMIN — ONDANSETRON 4 MILLIGRAM(S): 8 TABLET, FILM COATED ORAL at 17:53

## 2021-01-01 RX ADMIN — Medication 10 MILLIGRAM(S): at 22:38

## 2021-01-01 RX ADMIN — SERTRALINE 100 MILLIGRAM(S): 25 TABLET, FILM COATED ORAL at 12:47

## 2021-01-01 RX ADMIN — MEROPENEM 100 MILLIGRAM(S): 1 INJECTION INTRAVENOUS at 05:14

## 2021-01-01 RX ADMIN — OCTREOTIDE ACETATE 50 MICROGRAM(S): 200 INJECTION, SOLUTION INTRAVENOUS; SUBCUTANEOUS at 13:16

## 2021-01-01 RX ADMIN — Medication 10 MILLIGRAM(S): at 06:11

## 2021-01-01 RX ADMIN — Medication 125 MILLILITER(S): at 01:10

## 2021-01-01 RX ADMIN — Medication 125 MILLIGRAM(S): at 05:28

## 2021-01-01 RX ADMIN — Medication 10 MILLIGRAM(S): at 06:15

## 2021-01-01 RX ADMIN — SODIUM CHLORIDE 125 MILLILITER(S): 9 INJECTION INTRAMUSCULAR; INTRAVENOUS; SUBCUTANEOUS at 15:30

## 2021-01-01 RX ADMIN — CHLORHEXIDINE GLUCONATE 15 MILLILITER(S): 213 SOLUTION TOPICAL at 17:45

## 2021-01-01 RX ADMIN — PHENYLEPHRINE HYDROCHLORIDE 50 MICROGRAM(S)/KG/MIN: 10 INJECTION INTRAVENOUS at 18:11

## 2021-01-01 RX ADMIN — ONDANSETRON 4 MILLIGRAM(S): 8 TABLET, FILM COATED ORAL at 21:41

## 2021-01-01 RX ADMIN — HEPARIN SODIUM 5000 UNIT(S): 5000 INJECTION INTRAVENOUS; SUBCUTANEOUS at 22:42

## 2021-01-01 RX ADMIN — CHLORHEXIDINE GLUCONATE 1 APPLICATION(S): 213 SOLUTION TOPICAL at 06:49

## 2021-01-01 RX ADMIN — Medication 10 MILLIGRAM(S): at 11:54

## 2021-01-01 RX ADMIN — SODIUM CHLORIDE 3 MILLILITER(S): 9 INJECTION INTRAMUSCULAR; INTRAVENOUS; SUBCUTANEOUS at 22:47

## 2021-01-01 RX ADMIN — MEROPENEM 100 MILLIGRAM(S): 1 INJECTION INTRAVENOUS at 14:41

## 2021-01-01 RX ADMIN — Medication 10 MILLIGRAM(S): at 06:04

## 2021-01-01 RX ADMIN — Medication 400 MILLIGRAM(S): at 02:49

## 2021-01-01 RX ADMIN — PIPERACILLIN AND TAZOBACTAM 25 GRAM(S): 4; .5 INJECTION, POWDER, LYOPHILIZED, FOR SOLUTION INTRAVENOUS at 00:46

## 2021-01-01 RX ADMIN — MIRTAZAPINE 7.5 MILLIGRAM(S): 45 TABLET, ORALLY DISINTEGRATING ORAL at 11:16

## 2021-01-01 RX ADMIN — Medication 10 MILLIGRAM(S): at 05:59

## 2021-01-01 RX ADMIN — Medication 500 MILLIGRAM(S): at 05:28

## 2021-01-01 RX ADMIN — Medication 1 TABLET(S): at 12:03

## 2021-01-01 RX ADMIN — Medication 125 MILLIGRAM(S): at 00:45

## 2021-01-01 RX ADMIN — SODIUM CHLORIDE 50 MILLILITER(S): 9 INJECTION, SOLUTION INTRAVENOUS at 08:40

## 2021-01-01 RX ADMIN — ONDANSETRON 8 MILLIGRAM(S): 8 TABLET, FILM COATED ORAL at 21:50

## 2021-01-01 RX ADMIN — Medication 1000 MILLIGRAM(S): at 00:43

## 2021-01-01 RX ADMIN — SODIUM CHLORIDE 1000 MILLILITER(S): 9 INJECTION, SOLUTION INTRAVENOUS at 05:57

## 2021-01-01 RX ADMIN — Medication 0.5 MILLIGRAM(S): at 21:59

## 2021-01-01 RX ADMIN — Medication 125 MILLIGRAM(S): at 11:17

## 2021-01-01 RX ADMIN — Medication 0.5 MILLIGRAM(S): at 05:04

## 2021-01-01 RX ADMIN — OCTREOTIDE ACETATE 50 MICROGRAM(S): 200 INJECTION, SOLUTION INTRAVENOUS; SUBCUTANEOUS at 17:37

## 2021-01-01 RX ADMIN — Medication 125 MILLIGRAM(S): at 15:05

## 2021-01-01 RX ADMIN — ONDANSETRON 4 MILLIGRAM(S): 8 TABLET, FILM COATED ORAL at 02:48

## 2021-01-01 RX ADMIN — Medication 25 MILLIGRAM(S): at 22:13

## 2021-01-01 RX ADMIN — Medication 50 MILLIEQUIVALENT(S): at 18:48

## 2021-01-01 RX ADMIN — Medication 650 MILLIGRAM(S): at 06:59

## 2021-01-01 RX ADMIN — MEROPENEM 100 MILLIGRAM(S): 1 INJECTION INTRAVENOUS at 21:51

## 2021-01-01 RX ADMIN — CEFEPIME 100 MILLIGRAM(S): 1 INJECTION, POWDER, FOR SOLUTION INTRAMUSCULAR; INTRAVENOUS at 22:02

## 2021-01-01 RX ADMIN — Medication 100 MILLIGRAM(S): at 12:17

## 2021-01-01 RX ADMIN — Medication 10 MILLIGRAM(S): at 14:31

## 2021-01-01 RX ADMIN — HEPARIN SODIUM 5000 UNIT(S): 5000 INJECTION INTRAVENOUS; SUBCUTANEOUS at 16:48

## 2021-01-01 RX ADMIN — Medication 318.75 MILLIGRAM(S): at 06:33

## 2021-01-01 RX ADMIN — Medication 12.5 MILLIGRAM(S): at 12:17

## 2021-01-01 RX ADMIN — Medication 50 GRAM(S): at 01:24

## 2021-01-01 RX ADMIN — Medication 1: at 12:32

## 2021-01-01 RX ADMIN — Medication 10 MILLIGRAM(S): at 05:27

## 2021-01-01 RX ADMIN — FENTANYL CITRATE 100 MICROGRAM(S): 50 INJECTION INTRAVENOUS at 21:00

## 2021-01-01 RX ADMIN — Medication 1 TABLET(S): at 12:46

## 2021-01-01 RX ADMIN — ONDANSETRON 8 MILLIGRAM(S): 8 TABLET, FILM COATED ORAL at 05:16

## 2021-01-01 RX ADMIN — Medication 0.5 MILLIGRAM(S): at 22:12

## 2021-01-01 RX ADMIN — FENTANYL CITRATE 25 MICROGRAM(S): 50 INJECTION INTRAVENOUS at 08:05

## 2021-01-01 RX ADMIN — SERTRALINE 100 MILLIGRAM(S): 25 TABLET, FILM COATED ORAL at 11:34

## 2021-01-01 RX ADMIN — MIRTAZAPINE 7.5 MILLIGRAM(S): 45 TABLET, ORALLY DISINTEGRATING ORAL at 12:09

## 2021-01-01 RX ADMIN — MIRTAZAPINE 7.5 MILLIGRAM(S): 45 TABLET, ORALLY DISINTEGRATING ORAL at 12:27

## 2021-01-01 RX ADMIN — HEPARIN SODIUM 5000 UNIT(S): 5000 INJECTION INTRAVENOUS; SUBCUTANEOUS at 05:06

## 2021-01-01 RX ADMIN — Medication 125 MILLIGRAM(S): at 23:09

## 2021-01-01 RX ADMIN — Medication 10 MILLIGRAM(S): at 21:06

## 2021-01-01 RX ADMIN — ONDANSETRON 8 MILLIGRAM(S): 8 TABLET, FILM COATED ORAL at 21:47

## 2021-01-01 RX ADMIN — Medication 500 MICROGRAM(S): at 05:41

## 2021-01-01 RX ADMIN — CHLORHEXIDINE GLUCONATE 15 MILLILITER(S): 213 SOLUTION TOPICAL at 17:48

## 2021-01-01 RX ADMIN — Medication 100 MILLIGRAM(S): at 18:22

## 2021-01-01 RX ADMIN — Medication 125 MILLIGRAM(S): at 06:45

## 2021-01-01 RX ADMIN — Medication 500 MILLIGRAM(S): at 18:05

## 2021-01-01 RX ADMIN — Medication 318.75 MILLIGRAM(S): at 18:05

## 2021-01-01 RX ADMIN — PANTOPRAZOLE SODIUM 10 MG/HR: 20 TABLET, DELAYED RELEASE ORAL at 14:06

## 2021-01-01 RX ADMIN — SERTRALINE 100 MILLIGRAM(S): 25 TABLET, FILM COATED ORAL at 11:45

## 2021-01-01 RX ADMIN — Medication 125 MILLIGRAM(S): at 18:34

## 2021-01-01 RX ADMIN — HUMAN INSULIN 6 UNIT(S): 100 INJECTION, SUSPENSION SUBCUTANEOUS at 11:16

## 2021-01-01 RX ADMIN — MEROPENEM 100 MILLIGRAM(S): 1 INJECTION INTRAVENOUS at 13:46

## 2021-01-01 RX ADMIN — Medication 500 MILLIGRAM(S): at 17:44

## 2021-01-01 RX ADMIN — SERTRALINE 100 MILLIGRAM(S): 25 TABLET, FILM COATED ORAL at 12:37

## 2021-01-01 RX ADMIN — PANTOPRAZOLE SODIUM 40 MILLIGRAM(S): 20 TABLET, DELAYED RELEASE ORAL at 21:59

## 2021-01-01 RX ADMIN — Medication 25 MILLIGRAM(S): at 18:06

## 2021-01-01 RX ADMIN — CHLORHEXIDINE GLUCONATE 15 MILLILITER(S): 213 SOLUTION TOPICAL at 05:05

## 2021-01-01 RX ADMIN — Medication 50 MILLIGRAM(S): at 06:46

## 2021-01-01 RX ADMIN — Medication 40 MILLIGRAM(S): at 05:10

## 2021-01-01 RX ADMIN — PANTOPRAZOLE SODIUM 40 MILLIGRAM(S): 20 TABLET, DELAYED RELEASE ORAL at 18:08

## 2021-01-01 RX ADMIN — POLYETHYLENE GLYCOL 3350 17 GRAM(S): 17 POWDER, FOR SOLUTION ORAL at 13:21

## 2021-01-01 RX ADMIN — HEPARIN SODIUM 5000 UNIT(S): 5000 INJECTION INTRAVENOUS; SUBCUTANEOUS at 13:10

## 2021-01-01 RX ADMIN — CHLORHEXIDINE GLUCONATE 15 MILLILITER(S): 213 SOLUTION TOPICAL at 06:17

## 2021-01-01 RX ADMIN — Medication 1: at 11:16

## 2021-01-01 RX ADMIN — SPIRONOLACTONE 25 MILLIGRAM(S): 25 TABLET, FILM COATED ORAL at 08:48

## 2021-01-01 RX ADMIN — FENTANYL CITRATE 25 MICROGRAM(S): 50 INJECTION INTRAVENOUS at 12:20

## 2021-01-01 RX ADMIN — Medication 15 GRAM(S): at 13:47

## 2021-01-01 RX ADMIN — MAGNESIUM OXIDE 400 MG ORAL TABLET 400 MILLIGRAM(S): 241.3 TABLET ORAL at 05:44

## 2021-01-01 RX ADMIN — CHLORHEXIDINE GLUCONATE 15 MILLILITER(S): 213 SOLUTION TOPICAL at 06:16

## 2021-01-01 RX ADMIN — HEPARIN SODIUM 5000 UNIT(S): 5000 INJECTION INTRAVENOUS; SUBCUTANEOUS at 13:31

## 2021-01-01 RX ADMIN — Medication 100 MILLIGRAM(S): at 21:09

## 2021-01-01 RX ADMIN — Medication 318.75 MILLIGRAM(S): at 22:10

## 2021-01-01 RX ADMIN — HUMAN INSULIN 10 UNIT(S): 100 INJECTION, SUSPENSION SUBCUTANEOUS at 17:13

## 2021-01-01 RX ADMIN — Medication 25 MILLIGRAM(S): at 05:06

## 2021-01-01 RX ADMIN — Medication 100 MILLIGRAM(S): at 02:56

## 2021-01-01 RX ADMIN — FENTANYL CITRATE 50 MICROGRAM(S): 50 INJECTION INTRAVENOUS at 05:50

## 2021-01-01 RX ADMIN — Medication 50 MILLIEQUIVALENT(S): at 04:31

## 2021-01-01 RX ADMIN — VASOPRESSIN 1 UNIT(S)/MIN: 20 INJECTION INTRAVENOUS at 20:09

## 2021-01-01 RX ADMIN — HUMAN INSULIN 6 UNIT(S): 100 INJECTION, SUSPENSION SUBCUTANEOUS at 23:25

## 2021-01-01 RX ADMIN — Medication 1 TABLET(S): at 11:28

## 2021-01-01 RX ADMIN — PANTOPRAZOLE SODIUM 40 MILLIGRAM(S): 20 TABLET, DELAYED RELEASE ORAL at 06:44

## 2021-01-01 RX ADMIN — Medication 0.5 MILLIGRAM(S): at 05:05

## 2021-01-01 RX ADMIN — FENTANYL CITRATE 25 MICROGRAM(S): 50 INJECTION INTRAVENOUS at 16:00

## 2021-01-01 RX ADMIN — SODIUM CHLORIDE 250 MILLILITER(S): 9 INJECTION INTRAMUSCULAR; INTRAVENOUS; SUBCUTANEOUS at 15:37

## 2021-01-01 RX ADMIN — Medication 1 TABLET(S): at 12:24

## 2021-01-01 RX ADMIN — DEXMEDETOMIDINE HYDROCHLORIDE IN 0.9% SODIUM CHLORIDE 9.81 MICROGRAM(S)/KG/HR: 4 INJECTION INTRAVENOUS at 12:43

## 2021-01-01 RX ADMIN — MAGNESIUM OXIDE 400 MG ORAL TABLET 400 MILLIGRAM(S): 241.3 TABLET ORAL at 17:35

## 2021-01-01 RX ADMIN — Medication 125 MILLIGRAM(S): at 18:10

## 2021-01-01 RX ADMIN — Medication 25 MILLIGRAM(S): at 14:45

## 2021-01-01 RX ADMIN — CEFEPIME 100 MILLIGRAM(S): 1 INJECTION, POWDER, FOR SOLUTION INTRAMUSCULAR; INTRAVENOUS at 06:29

## 2021-01-01 RX ADMIN — Medication 10 MILLIGRAM(S): at 06:41

## 2021-01-01 RX ADMIN — Medication 3 MILLILITER(S): at 16:34

## 2021-01-01 RX ADMIN — Medication 12.5 GRAM(S): at 17:26

## 2021-01-01 RX ADMIN — Medication 125 MILLIGRAM(S): at 08:41

## 2021-01-01 RX ADMIN — Medication 1 MILLIGRAM(S): at 21:45

## 2021-01-01 RX ADMIN — WARFARIN SODIUM 5 MILLIGRAM(S): 2.5 TABLET ORAL at 22:05

## 2021-01-01 RX ADMIN — Medication 650 MILLIGRAM(S): at 22:35

## 2021-01-01 RX ADMIN — Medication 318.75 MILLIGRAM(S): at 18:51

## 2021-01-01 RX ADMIN — Medication 12.5 MILLIGRAM(S): at 05:11

## 2021-01-01 RX ADMIN — HUMAN INSULIN 4 UNIT(S): 100 INJECTION, SUSPENSION SUBCUTANEOUS at 06:56

## 2021-01-01 RX ADMIN — LIDOCAINE 1 PATCH: 4 CREAM TOPICAL at 14:36

## 2021-01-01 RX ADMIN — Medication 81 MILLIGRAM(S): at 12:13

## 2021-01-01 RX ADMIN — Medication 318.75 MILLIGRAM(S): at 05:34

## 2021-01-01 RX ADMIN — Medication 125 MILLIGRAM(S): at 17:23

## 2021-01-01 RX ADMIN — Medication 100 MILLIGRAM(S): at 14:24

## 2021-01-01 RX ADMIN — Medication 50 GRAM(S): at 16:16

## 2021-01-01 RX ADMIN — PIPERACILLIN AND TAZOBACTAM 25 GRAM(S): 4; .5 INJECTION, POWDER, LYOPHILIZED, FOR SOLUTION INTRAVENOUS at 07:59

## 2021-01-01 RX ADMIN — Medication 2: at 17:46

## 2021-01-01 RX ADMIN — Medication 10 MILLIGRAM(S): at 05:11

## 2021-01-01 RX ADMIN — CEFEPIME 100 MILLIGRAM(S): 1 INJECTION, POWDER, FOR SOLUTION INTRAMUSCULAR; INTRAVENOUS at 22:26

## 2021-01-01 RX ADMIN — Medication 50 MILLIEQUIVALENT(S): at 17:55

## 2021-01-01 RX ADMIN — Medication 125 MILLILITER(S): at 16:15

## 2021-01-01 RX ADMIN — AMIODARONE HYDROCHLORIDE 200 MILLIGRAM(S): 400 TABLET ORAL at 05:35

## 2021-01-01 RX ADMIN — CHLORHEXIDINE GLUCONATE 15 MILLILITER(S): 213 SOLUTION TOPICAL at 05:28

## 2021-01-01 RX ADMIN — CEFEPIME 100 MILLIGRAM(S): 1 INJECTION, POWDER, FOR SOLUTION INTRAMUSCULAR; INTRAVENOUS at 21:56

## 2021-01-01 RX ADMIN — PANTOPRAZOLE SODIUM 40 MILLIGRAM(S): 20 TABLET, DELAYED RELEASE ORAL at 17:27

## 2021-01-01 RX ADMIN — Medication 100 GRAM(S): at 05:05

## 2021-01-01 RX ADMIN — Medication 100 MILLIGRAM(S): at 17:22

## 2021-01-01 RX ADMIN — Medication 250 MILLIGRAM(S): at 06:17

## 2021-01-01 RX ADMIN — Medication 400 MILLIGRAM(S): at 05:23

## 2021-01-01 RX ADMIN — Medication 318.75 MILLIGRAM(S): at 12:00

## 2021-01-01 RX ADMIN — MIRTAZAPINE 7.5 MILLIGRAM(S): 45 TABLET, ORALLY DISINTEGRATING ORAL at 21:14

## 2021-01-01 RX ADMIN — Medication 50 MILLIGRAM(S): at 08:18

## 2021-01-01 RX ADMIN — Medication 20 MILLIEQUIVALENT(S): at 16:26

## 2021-01-01 RX ADMIN — SERTRALINE 100 MILLIGRAM(S): 25 TABLET, FILM COATED ORAL at 12:38

## 2021-01-01 RX ADMIN — AMIODARONE HYDROCHLORIDE 200 MILLIGRAM(S): 400 TABLET ORAL at 05:03

## 2021-01-01 RX ADMIN — MIRTAZAPINE 7.5 MILLIGRAM(S): 45 TABLET, ORALLY DISINTEGRATING ORAL at 14:38

## 2021-01-01 RX ADMIN — Medication 650 MILLIGRAM(S): at 23:15

## 2021-01-01 RX ADMIN — Medication 10 MILLIGRAM(S): at 14:16

## 2021-01-01 RX ADMIN — Medication 10 MILLIGRAM(S): at 13:09

## 2021-01-01 RX ADMIN — Medication 10 MILLIGRAM(S): at 13:41

## 2021-01-01 RX ADMIN — SCOPALAMINE 1 PATCH: 1 PATCH, EXTENDED RELEASE TRANSDERMAL at 10:50

## 2021-01-01 RX ADMIN — MIRTAZAPINE 7.5 MILLIGRAM(S): 45 TABLET, ORALLY DISINTEGRATING ORAL at 13:30

## 2021-01-01 RX ADMIN — Medication 650 MILLIGRAM(S): at 12:12

## 2021-01-01 RX ADMIN — PANTOPRAZOLE SODIUM 40 MILLIGRAM(S): 20 TABLET, DELAYED RELEASE ORAL at 06:56

## 2021-01-01 RX ADMIN — Medication 1: at 12:30

## 2021-01-01 RX ADMIN — DEXMEDETOMIDINE HYDROCHLORIDE IN 0.9% SODIUM CHLORIDE 9.81 MICROGRAM(S)/KG/HR: 4 INJECTION INTRAVENOUS at 14:34

## 2021-01-01 RX ADMIN — SODIUM CHLORIDE 10 MILLILITER(S): 9 INJECTION INTRAMUSCULAR; INTRAVENOUS; SUBCUTANEOUS at 08:04

## 2021-01-01 RX ADMIN — CHLORHEXIDINE GLUCONATE 1 APPLICATION(S): 213 SOLUTION TOPICAL at 11:50

## 2021-01-01 RX ADMIN — Medication 100 MILLIGRAM(S): at 12:32

## 2021-01-01 RX ADMIN — Medication 100 MILLIGRAM(S): at 14:10

## 2021-01-01 RX ADMIN — Medication 650 MILLIGRAM(S): at 12:18

## 2021-01-01 RX ADMIN — Medication 300 MILLIGRAM(S): at 17:13

## 2021-01-01 RX ADMIN — CHLORHEXIDINE GLUCONATE 1 APPLICATION(S): 213 SOLUTION TOPICAL at 05:13

## 2021-01-01 RX ADMIN — Medication 500 MILLIGRAM(S): at 18:24

## 2021-01-01 RX ADMIN — Medication 125 MILLIGRAM(S): at 07:44

## 2021-01-01 RX ADMIN — SODIUM CHLORIDE 30 MILLILITER(S): 9 INJECTION, SOLUTION INTRAVENOUS at 10:15

## 2021-01-01 RX ADMIN — Medication 100 MILLIGRAM(S): at 13:16

## 2021-01-01 RX ADMIN — Medication 50 GRAM(S): at 11:32

## 2021-01-01 RX ADMIN — Medication 650 MILLIGRAM(S): at 22:51

## 2021-01-01 RX ADMIN — Medication 100 MILLIGRAM(S): at 05:27

## 2021-01-01 RX ADMIN — Medication 50 MILLIEQUIVALENT(S): at 04:46

## 2021-01-01 RX ADMIN — CEFEPIME 100 MILLIGRAM(S): 1 INJECTION, POWDER, FOR SOLUTION INTRAMUSCULAR; INTRAVENOUS at 13:22

## 2021-01-01 RX ADMIN — Medication 10 MILLIGRAM(S): at 12:14

## 2021-01-01 RX ADMIN — Medication 1: at 13:22

## 2021-01-01 RX ADMIN — Medication 1 TABLET(S): at 11:13

## 2021-01-01 RX ADMIN — WARFARIN SODIUM 6 MILLIGRAM(S): 2.5 TABLET ORAL at 01:26

## 2021-01-01 RX ADMIN — Medication 300 MILLIGRAM(S): at 08:40

## 2021-01-01 RX ADMIN — Medication 500 MILLIGRAM(S): at 19:10

## 2021-01-01 RX ADMIN — FENTANYL CITRATE 12.5 MICROGRAM(S): 50 INJECTION INTRAVENOUS at 15:31

## 2021-01-01 RX ADMIN — DEXMEDETOMIDINE HYDROCHLORIDE IN 0.9% SODIUM CHLORIDE 7.85 MICROGRAM(S)/KG/HR: 4 INJECTION INTRAVENOUS at 05:01

## 2021-01-01 RX ADMIN — Medication 1 MILLIGRAM(S): at 21:03

## 2021-01-01 RX ADMIN — MIRTAZAPINE 7.5 MILLIGRAM(S): 45 TABLET, ORALLY DISINTEGRATING ORAL at 12:06

## 2021-01-01 RX ADMIN — MIRTAZAPINE 7.5 MILLIGRAM(S): 45 TABLET, ORALLY DISINTEGRATING ORAL at 11:26

## 2021-01-01 RX ADMIN — Medication 2: at 21:50

## 2021-01-01 RX ADMIN — Medication 102 MILLIGRAM(S): at 13:59

## 2021-01-01 RX ADMIN — Medication 10 MILLIGRAM(S): at 21:29

## 2021-01-01 RX ADMIN — Medication 50 MILLIEQUIVALENT(S): at 20:10

## 2021-01-01 RX ADMIN — CHLORHEXIDINE GLUCONATE 1 APPLICATION(S): 213 SOLUTION TOPICAL at 05:47

## 2021-01-01 RX ADMIN — VECURONIUM BROMIDE 5 MILLIGRAM(S): 20 INJECTION, POWDER, FOR SOLUTION INTRAVENOUS at 00:30

## 2021-01-01 RX ADMIN — MEROPENEM 100 MILLIGRAM(S): 1 INJECTION INTRAVENOUS at 21:07

## 2021-01-01 RX ADMIN — Medication 1 TABLET(S): at 12:25

## 2021-01-01 RX ADMIN — HALOPERIDOL DECANOATE 5 MILLIGRAM(S): 100 INJECTION INTRAMUSCULAR at 16:45

## 2021-01-01 RX ADMIN — Medication 1 TABLET(S): at 12:13

## 2021-01-01 RX ADMIN — Medication 2: at 05:59

## 2021-01-01 RX ADMIN — CEFEPIME 100 MILLIGRAM(S): 1 INJECTION, POWDER, FOR SOLUTION INTRAMUSCULAR; INTRAVENOUS at 14:10

## 2021-01-01 RX ADMIN — Medication 10 MILLIGRAM(S): at 14:17

## 2021-01-01 RX ADMIN — ONDANSETRON 4 MILLIGRAM(S): 8 TABLET, FILM COATED ORAL at 08:57

## 2021-01-01 RX ADMIN — CEFEPIME 100 MILLIGRAM(S): 1 INJECTION, POWDER, FOR SOLUTION INTRAMUSCULAR; INTRAVENOUS at 06:09

## 2021-01-01 RX ADMIN — Medication 125 MILLIGRAM(S): at 00:15

## 2021-01-01 RX ADMIN — Medication 10 MILLIGRAM(S): at 05:07

## 2021-01-01 RX ADMIN — Medication 125 MILLILITER(S): at 23:05

## 2021-01-01 RX ADMIN — Medication 50 MILLIGRAM(S): at 14:34

## 2021-01-01 RX ADMIN — Medication 3 MILLILITER(S): at 18:28

## 2021-01-01 RX ADMIN — Medication 125 MILLIGRAM(S): at 08:50

## 2021-01-01 RX ADMIN — Medication 50 MILLIEQUIVALENT(S): at 02:20

## 2021-01-01 RX ADMIN — SODIUM CHLORIDE 3 MILLILITER(S): 9 INJECTION INTRAMUSCULAR; INTRAVENOUS; SUBCUTANEOUS at 05:13

## 2021-01-01 RX ADMIN — PANTOPRAZOLE SODIUM 40 MILLIGRAM(S): 20 TABLET, DELAYED RELEASE ORAL at 06:36

## 2021-01-01 RX ADMIN — LIDOCAINE 1 PATCH: 4 CREAM TOPICAL at 10:10

## 2021-01-01 RX ADMIN — Medication 125 MILLIGRAM(S): at 05:22

## 2021-01-01 RX ADMIN — PANTOPRAZOLE SODIUM 40 MILLIGRAM(S): 20 TABLET, DELAYED RELEASE ORAL at 05:45

## 2021-01-01 RX ADMIN — SODIUM CHLORIDE 3 MILLILITER(S): 9 INJECTION INTRAMUSCULAR; INTRAVENOUS; SUBCUTANEOUS at 20:22

## 2021-01-01 RX ADMIN — HEPARIN SODIUM 12.5 UNIT(S)/HR: 5000 INJECTION INTRAVENOUS; SUBCUTANEOUS at 14:09

## 2021-01-01 RX ADMIN — HUMAN INSULIN 10 UNIT(S): 100 INJECTION, SUSPENSION SUBCUTANEOUS at 00:48

## 2021-01-01 RX ADMIN — Medication 50 GRAM(S): at 05:19

## 2021-01-01 RX ADMIN — HEPARIN SODIUM 5000 UNIT(S): 5000 INJECTION INTRAVENOUS; SUBCUTANEOUS at 13:09

## 2021-01-01 RX ADMIN — HEPARIN SODIUM 5000 UNIT(S): 5000 INJECTION INTRAVENOUS; SUBCUTANEOUS at 13:01

## 2021-01-01 RX ADMIN — LOSARTAN POTASSIUM 25 MILLIGRAM(S): 100 TABLET, FILM COATED ORAL at 17:48

## 2021-01-01 RX ADMIN — MEROPENEM 100 MILLIGRAM(S): 1 INJECTION INTRAVENOUS at 05:03

## 2021-01-01 RX ADMIN — Medication 650 MILLIGRAM(S): at 13:14

## 2021-01-01 RX ADMIN — Medication 1: at 00:34

## 2021-01-01 RX ADMIN — HYDROMORPHONE HYDROCHLORIDE 0.5 MILLIGRAM(S): 2 INJECTION INTRAMUSCULAR; INTRAVENOUS; SUBCUTANEOUS at 12:43

## 2021-01-01 RX ADMIN — Medication 125 MILLIGRAM(S): at 06:14

## 2021-01-01 RX ADMIN — Medication 125 MILLIGRAM(S): at 05:01

## 2021-01-01 RX ADMIN — ONDANSETRON 8 MILLIGRAM(S): 8 TABLET, FILM COATED ORAL at 14:59

## 2021-01-01 RX ADMIN — CHLORHEXIDINE GLUCONATE 1 APPLICATION(S): 213 SOLUTION TOPICAL at 11:00

## 2021-01-01 RX ADMIN — Medication 50 MILLIEQUIVALENT(S): at 01:15

## 2021-01-01 RX ADMIN — Medication 318.75 MILLIGRAM(S): at 19:26

## 2021-01-01 RX ADMIN — Medication 125 MILLIGRAM(S): at 00:55

## 2021-01-01 RX ADMIN — Medication 100 MILLIGRAM(S): at 05:17

## 2021-01-01 RX ADMIN — HEPARIN SODIUM 5000 UNIT(S): 5000 INJECTION INTRAVENOUS; SUBCUTANEOUS at 13:15

## 2021-01-01 RX ADMIN — SODIUM CHLORIDE 3 MILLILITER(S): 9 INJECTION INTRAMUSCULAR; INTRAVENOUS; SUBCUTANEOUS at 21:14

## 2021-01-01 RX ADMIN — PANTOPRAZOLE SODIUM 40 MILLIGRAM(S): 20 TABLET, DELAYED RELEASE ORAL at 05:37

## 2021-01-01 RX ADMIN — AMIODARONE HYDROCHLORIDE 200 MILLIGRAM(S): 400 TABLET ORAL at 06:29

## 2021-01-01 RX ADMIN — Medication 15 GRAM(S): at 11:49

## 2021-01-01 RX ADMIN — Medication 83.33 MILLIMOLE(S): at 03:52

## 2021-01-01 RX ADMIN — MEROPENEM 100 MILLIGRAM(S): 1 INJECTION INTRAVENOUS at 05:18

## 2021-01-01 RX ADMIN — Medication 125 MILLIGRAM(S): at 06:41

## 2021-01-01 RX ADMIN — CHLORHEXIDINE GLUCONATE 15 MILLILITER(S): 213 SOLUTION TOPICAL at 06:36

## 2021-01-01 RX ADMIN — Medication 50 MILLIEQUIVALENT(S): at 05:49

## 2021-01-01 RX ADMIN — Medication 20 MILLIEQUIVALENT(S): at 12:22

## 2021-01-01 RX ADMIN — Medication 10 MILLIGRAM(S): at 13:49

## 2021-01-01 RX ADMIN — Medication 25 MILLIGRAM(S): at 05:59

## 2021-01-01 RX ADMIN — WARFARIN SODIUM 3 MILLIGRAM(S): 2.5 TABLET ORAL at 21:42

## 2021-01-01 RX ADMIN — Medication 1: at 11:29

## 2021-01-01 RX ADMIN — Medication 125 MILLIGRAM(S): at 05:23

## 2021-01-01 RX ADMIN — SCOPALAMINE 1 PATCH: 1 PATCH, EXTENDED RELEASE TRANSDERMAL at 20:00

## 2021-01-01 RX ADMIN — OCTREOTIDE ACETATE 50 MICROGRAM(S): 200 INJECTION, SOLUTION INTRAVENOUS; SUBCUTANEOUS at 06:06

## 2021-01-01 RX ADMIN — Medication 25 MILLIGRAM(S): at 17:12

## 2021-01-01 RX ADMIN — Medication 250 MILLIGRAM(S): at 05:08

## 2021-01-01 RX ADMIN — Medication 1 TABLET(S): at 13:05

## 2021-01-01 RX ADMIN — Medication 125 MILLIGRAM(S): at 23:20

## 2021-01-01 RX ADMIN — Medication 10 MILLIGRAM(S): at 14:21

## 2021-01-01 RX ADMIN — Medication 10 MILLIGRAM(S): at 17:10

## 2021-01-01 RX ADMIN — MEROPENEM 100 MILLIGRAM(S): 1 INJECTION INTRAVENOUS at 06:07

## 2021-01-01 RX ADMIN — Medication 318.75 MILLIGRAM(S): at 07:11

## 2021-01-01 RX ADMIN — PANTOPRAZOLE SODIUM 40 MILLIGRAM(S): 20 TABLET, DELAYED RELEASE ORAL at 06:07

## 2021-01-01 RX ADMIN — Medication 400 MILLIGRAM(S): at 04:55

## 2021-01-01 RX ADMIN — Medication 50 MILLIEQUIVALENT(S): at 23:05

## 2021-01-01 RX ADMIN — Medication 0.5 MILLIGRAM(S): at 11:08

## 2021-01-01 RX ADMIN — Medication 125 MILLIGRAM(S): at 11:14

## 2021-01-01 RX ADMIN — ONDANSETRON 8 MILLIGRAM(S): 8 TABLET, FILM COATED ORAL at 06:13

## 2021-01-01 RX ADMIN — Medication 650 MILLIGRAM(S): at 01:52

## 2021-01-01 RX ADMIN — CHOLESTYRAMINE 4 GRAM(S): 4 POWDER, FOR SUSPENSION ORAL at 16:08

## 2021-01-01 RX ADMIN — SIMETHICONE 80 MILLIGRAM(S): 80 TABLET, CHEWABLE ORAL at 09:31

## 2021-01-01 RX ADMIN — OCTREOTIDE ACETATE 50 MICROGRAM(S): 200 INJECTION, SOLUTION INTRAVENOUS; SUBCUTANEOUS at 05:36

## 2021-01-01 RX ADMIN — Medication 50 MILLIGRAM(S): at 21:01

## 2021-01-01 RX ADMIN — Medication 125 MILLIGRAM(S): at 17:21

## 2021-01-01 RX ADMIN — SPIRONOLACTONE 25 MILLIGRAM(S): 25 TABLET, FILM COATED ORAL at 05:44

## 2021-01-01 RX ADMIN — HEPARIN SODIUM 5000 UNIT(S): 5000 INJECTION INTRAVENOUS; SUBCUTANEOUS at 21:50

## 2021-01-01 RX ADMIN — PANTOPRAZOLE SODIUM 40 MILLIGRAM(S): 20 TABLET, DELAYED RELEASE ORAL at 05:39

## 2021-01-01 RX ADMIN — PANTOPRAZOLE SODIUM 40 MILLIGRAM(S): 20 TABLET, DELAYED RELEASE ORAL at 17:32

## 2021-01-01 RX ADMIN — Medication 100 MILLIGRAM(S): at 22:03

## 2021-01-01 RX ADMIN — Medication 10 MILLIGRAM(S): at 06:47

## 2021-01-01 RX ADMIN — HEPARIN SODIUM 5000 UNIT(S): 5000 INJECTION INTRAVENOUS; SUBCUTANEOUS at 13:11

## 2021-01-01 RX ADMIN — Medication 50 GRAM(S): at 01:25

## 2021-01-01 RX ADMIN — SERTRALINE 100 MILLIGRAM(S): 25 TABLET, FILM COATED ORAL at 11:15

## 2021-01-01 RX ADMIN — PANTOPRAZOLE SODIUM 40 MILLIGRAM(S): 20 TABLET, DELAYED RELEASE ORAL at 17:23

## 2021-01-01 RX ADMIN — Medication 5 MILLIGRAM(S): at 21:06

## 2021-01-01 RX ADMIN — MEROPENEM 100 MILLIGRAM(S): 1 INJECTION INTRAVENOUS at 21:39

## 2021-01-01 RX ADMIN — Medication 20 MILLIEQUIVALENT(S): at 14:53

## 2021-01-01 RX ADMIN — CHLORHEXIDINE GLUCONATE 1 APPLICATION(S): 213 SOLUTION TOPICAL at 05:41

## 2021-01-01 RX ADMIN — Medication 20 MILLIGRAM(S): at 13:51

## 2021-01-01 RX ADMIN — CLOPIDOGREL BISULFATE 75 MILLIGRAM(S): 75 TABLET, FILM COATED ORAL at 12:48

## 2021-01-01 RX ADMIN — PROPOFOL 9.42 MICROGRAM(S)/KG/MIN: 10 INJECTION, EMULSION INTRAVENOUS at 08:46

## 2021-01-01 RX ADMIN — Medication 1 PACKET(S): at 13:59

## 2021-01-01 RX ADMIN — Medication 50 MILLIEQUIVALENT(S): at 01:39

## 2021-01-01 RX ADMIN — Medication 5 MILLIGRAM(S): at 22:39

## 2021-01-01 RX ADMIN — VECURONIUM BROMIDE 10 MILLIGRAM(S): 20 INJECTION, POWDER, FOR SOLUTION INTRAVENOUS at 21:00

## 2021-01-01 RX ADMIN — Medication 1 TABLET(S): at 11:45

## 2021-01-01 RX ADMIN — Medication 125 MILLIGRAM(S): at 12:26

## 2021-01-01 RX ADMIN — FENTANYL CITRATE 25 MICROGRAM(S): 50 INJECTION INTRAVENOUS at 11:45

## 2021-01-01 RX ADMIN — Medication 250 MILLIGRAM(S): at 06:48

## 2021-01-01 RX ADMIN — FOSAPREPITANT DIMEGLUMINE 300 MILLIGRAM(S): 150 INJECTION, POWDER, LYOPHILIZED, FOR SOLUTION INTRAVENOUS at 12:30

## 2021-01-01 RX ADMIN — Medication 25 MILLIGRAM(S): at 13:42

## 2021-01-01 RX ADMIN — Medication 10 MILLIGRAM(S): at 22:22

## 2021-01-01 RX ADMIN — EPINEPHRINE 5.89 MICROGRAM(S)/KG/MIN: 0.3 INJECTION INTRAMUSCULAR; SUBCUTANEOUS at 08:45

## 2021-01-01 RX ADMIN — Medication 125 MILLIGRAM(S): at 16:32

## 2021-01-01 RX ADMIN — SERTRALINE 100 MILLIGRAM(S): 25 TABLET, FILM COATED ORAL at 13:33

## 2021-01-01 RX ADMIN — SODIUM CHLORIDE 30 MILLILITER(S): 9 INJECTION, SOLUTION INTRAVENOUS at 12:51

## 2021-01-01 RX ADMIN — Medication 125 MILLIGRAM(S): at 05:14

## 2021-01-01 RX ADMIN — Medication 1 TABLET(S): at 12:47

## 2021-01-01 RX ADMIN — Medication 250 MILLIGRAM(S): at 18:34

## 2021-01-01 RX ADMIN — Medication 1500 MILLILITER(S): at 22:31

## 2021-01-01 RX ADMIN — HEPARIN SODIUM 5000 UNIT(S): 5000 INJECTION INTRAVENOUS; SUBCUTANEOUS at 21:57

## 2021-01-01 RX ADMIN — CHLORHEXIDINE GLUCONATE 15 MILLILITER(S): 213 SOLUTION TOPICAL at 17:16

## 2021-01-01 RX ADMIN — Medication 10 MILLIGRAM(S): at 14:42

## 2021-01-01 RX ADMIN — CHLORHEXIDINE GLUCONATE 15 MILLILITER(S): 213 SOLUTION TOPICAL at 05:04

## 2021-01-01 RX ADMIN — Medication 10 MILLIGRAM(S): at 13:50

## 2021-01-01 RX ADMIN — CHLORHEXIDINE GLUCONATE 15 MILLILITER(S): 213 SOLUTION TOPICAL at 17:22

## 2021-01-01 RX ADMIN — Medication 12.5 MILLIGRAM(S): at 12:55

## 2021-01-01 RX ADMIN — PANTOPRAZOLE SODIUM 40 MILLIGRAM(S): 20 TABLET, DELAYED RELEASE ORAL at 21:21

## 2021-01-01 RX ADMIN — Medication 1 MILLIGRAM(S): at 22:02

## 2021-01-01 RX ADMIN — Medication 5 MILLIGRAM(S): at 22:35

## 2021-01-01 RX ADMIN — SODIUM CHLORIDE 3 MILLILITER(S): 9 INJECTION INTRAMUSCULAR; INTRAVENOUS; SUBCUTANEOUS at 12:11

## 2021-01-01 RX ADMIN — Medication 40 MILLIEQUIVALENT(S): at 06:52

## 2021-01-01 RX ADMIN — SODIUM CHLORIDE 1 GRAM(S): 9 INJECTION INTRAMUSCULAR; INTRAVENOUS; SUBCUTANEOUS at 17:25

## 2021-01-01 RX ADMIN — VASOPRESSIN 1 UNIT(S)/MIN: 20 INJECTION INTRAVENOUS at 21:07

## 2021-01-01 RX ADMIN — HYDROMORPHONE HYDROCHLORIDE 0.5 MILLIGRAM(S): 2 INJECTION INTRAMUSCULAR; INTRAVENOUS; SUBCUTANEOUS at 00:05

## 2021-01-01 RX ADMIN — SODIUM CHLORIDE 3 MILLILITER(S): 9 INJECTION INTRAMUSCULAR; INTRAVENOUS; SUBCUTANEOUS at 05:11

## 2021-01-01 RX ADMIN — SERTRALINE 50 MILLIGRAM(S): 25 TABLET, FILM COATED ORAL at 11:18

## 2021-01-01 RX ADMIN — Medication 5 MILLIGRAM(S): at 15:18

## 2021-01-01 RX ADMIN — Medication 500 MILLIGRAM(S): at 01:32

## 2021-01-01 RX ADMIN — Medication 500 MICROGRAM(S): at 11:44

## 2021-01-01 RX ADMIN — SPIRONOLACTONE 25 MILLIGRAM(S): 25 TABLET, FILM COATED ORAL at 05:57

## 2021-01-01 RX ADMIN — FENTANYL CITRATE 25 MICROGRAM(S): 50 INJECTION INTRAVENOUS at 19:59

## 2021-01-01 RX ADMIN — HYDROMORPHONE HYDROCHLORIDE 0.5 MILLIGRAM(S): 2 INJECTION INTRAMUSCULAR; INTRAVENOUS; SUBCUTANEOUS at 16:00

## 2021-01-01 RX ADMIN — Medication 125 MILLIGRAM(S): at 00:30

## 2021-01-01 RX ADMIN — TRAMADOL HYDROCHLORIDE 25 MILLIGRAM(S): 50 TABLET ORAL at 12:45

## 2021-01-01 RX ADMIN — HEPARIN SODIUM 5000 UNIT(S): 5000 INJECTION INTRAVENOUS; SUBCUTANEOUS at 22:16

## 2021-01-01 RX ADMIN — Medication 12.5 MILLIGRAM(S): at 17:13

## 2021-01-01 RX ADMIN — SPIRONOLACTONE 25 MILLIGRAM(S): 25 TABLET, FILM COATED ORAL at 05:34

## 2021-01-01 RX ADMIN — MIRTAZAPINE 7.5 MILLIGRAM(S): 45 TABLET, ORALLY DISINTEGRATING ORAL at 21:22

## 2021-01-01 RX ADMIN — KETAMINE HYDROCHLORIDE 3.93 MG/KG/HR: 100 INJECTION INTRAMUSCULAR; INTRAVENOUS at 03:06

## 2021-01-01 RX ADMIN — Medication 125 MILLIGRAM(S): at 12:49

## 2021-01-01 RX ADMIN — Medication 25 MILLIGRAM(S): at 06:41

## 2021-01-01 RX ADMIN — Medication 1 MILLIGRAM(S): at 13:24

## 2021-01-01 RX ADMIN — Medication 10 MILLIGRAM(S): at 17:15

## 2021-01-01 RX ADMIN — CHLORHEXIDINE GLUCONATE 15 MILLILITER(S): 213 SOLUTION TOPICAL at 05:06

## 2021-01-01 RX ADMIN — Medication 10 MILLIGRAM(S): at 12:38

## 2021-01-01 RX ADMIN — Medication 125 MILLIGRAM(S): at 17:29

## 2021-01-01 RX ADMIN — Medication 7.36 MICROGRAM(S)/KG/MIN: at 05:38

## 2021-01-01 RX ADMIN — HUMAN INSULIN 4 UNIT(S): 100 INJECTION, SUSPENSION SUBCUTANEOUS at 11:49

## 2021-01-01 RX ADMIN — Medication 125 MILLIGRAM(S): at 05:30

## 2021-01-01 RX ADMIN — CHLORHEXIDINE GLUCONATE 1 APPLICATION(S): 213 SOLUTION TOPICAL at 07:13

## 2021-01-01 RX ADMIN — FENTANYL CITRATE 12 MICROGRAM(S): 50 INJECTION INTRAVENOUS at 02:28

## 2021-01-01 RX ADMIN — CHLORHEXIDINE GLUCONATE 15 MILLILITER(S): 213 SOLUTION TOPICAL at 05:34

## 2021-01-01 RX ADMIN — Medication 10 MILLIGRAM(S): at 13:44

## 2021-01-01 RX ADMIN — Medication 2: at 05:48

## 2021-01-01 RX ADMIN — AMIODARONE HYDROCHLORIDE 200 MILLIGRAM(S): 400 TABLET ORAL at 05:21

## 2021-01-01 RX ADMIN — DEXMEDETOMIDINE HYDROCHLORIDE IN 0.9% SODIUM CHLORIDE 23.1 MICROGRAM(S)/KG/HR: 4 INJECTION INTRAVENOUS at 05:03

## 2021-01-01 RX ADMIN — Medication 1: at 06:06

## 2021-01-01 RX ADMIN — Medication 125 MILLIGRAM(S): at 17:44

## 2021-01-01 RX ADMIN — Medication 100 MILLIGRAM(S): at 11:29

## 2021-01-01 RX ADMIN — Medication 10 MILLIGRAM(S): at 14:58

## 2021-01-01 RX ADMIN — ETOMIDATE 40 MILLIGRAM(S): 2 INJECTION INTRAVENOUS at 11:59

## 2021-01-01 RX ADMIN — Medication 1 TABLET(S): at 11:09

## 2021-01-01 RX ADMIN — Medication 1 TABLET(S): at 17:21

## 2021-01-01 RX ADMIN — Medication 50 GRAM(S): at 04:47

## 2021-01-01 RX ADMIN — CEFEPIME 100 MILLIGRAM(S): 1 INJECTION, POWDER, FOR SOLUTION INTRAMUSCULAR; INTRAVENOUS at 17:42

## 2021-01-01 RX ADMIN — PANTOPRAZOLE SODIUM 40 MILLIGRAM(S): 20 TABLET, DELAYED RELEASE ORAL at 17:08

## 2021-01-01 RX ADMIN — HEPARIN SODIUM 5000 UNIT(S): 5000 INJECTION INTRAVENOUS; SUBCUTANEOUS at 15:57

## 2021-01-01 RX ADMIN — Medication 125 MILLILITER(S): at 10:56

## 2021-01-01 RX ADMIN — Medication 50 MILLILITER(S): at 03:04

## 2021-01-01 RX ADMIN — ROBINUL 1 MILLIGRAM(S): 0.2 INJECTION INTRAMUSCULAR; INTRAVENOUS at 18:10

## 2021-01-01 RX ADMIN — HUMAN INSULIN 4 UNIT(S): 100 INJECTION, SUSPENSION SUBCUTANEOUS at 17:09

## 2021-01-01 RX ADMIN — CHLORHEXIDINE GLUCONATE 15 MILLILITER(S): 213 SOLUTION TOPICAL at 17:20

## 2021-01-01 RX ADMIN — Medication 10 MILLIGRAM(S): at 13:46

## 2021-01-01 RX ADMIN — Medication 1 TABLET(S): at 13:45

## 2021-01-01 RX ADMIN — SPIRONOLACTONE 12.5 MILLIGRAM(S): 25 TABLET, FILM COATED ORAL at 06:07

## 2021-01-01 RX ADMIN — Medication 81 MILLIGRAM(S): at 11:56

## 2021-01-01 RX ADMIN — Medication 125 MILLIGRAM(S): at 06:15

## 2021-01-01 RX ADMIN — ENOXAPARIN SODIUM 60 MILLIGRAM(S): 100 INJECTION SUBCUTANEOUS at 13:18

## 2021-01-01 RX ADMIN — Medication 125 MILLIGRAM(S): at 06:28

## 2021-01-01 RX ADMIN — Medication 3 MILLILITER(S): at 17:26

## 2021-01-01 RX ADMIN — Medication 15 GRAM(S): at 12:15

## 2021-01-01 RX ADMIN — MIRTAZAPINE 7.5 MILLIGRAM(S): 45 TABLET, ORALLY DISINTEGRATING ORAL at 21:03

## 2021-01-01 RX ADMIN — CHLORHEXIDINE GLUCONATE 15 MILLILITER(S): 213 SOLUTION TOPICAL at 17:13

## 2021-01-01 RX ADMIN — Medication 15 GRAM(S): at 12:49

## 2021-01-01 RX ADMIN — Medication 125 MILLIGRAM(S): at 05:35

## 2021-01-01 RX ADMIN — Medication 300 MILLIGRAM(S): at 05:32

## 2021-01-01 RX ADMIN — Medication 1: at 17:18

## 2021-01-01 RX ADMIN — Medication 50 MILLIEQUIVALENT(S): at 02:50

## 2021-01-01 RX ADMIN — CHOLESTYRAMINE 4 GRAM(S): 4 POWDER, FOR SUSPENSION ORAL at 05:30

## 2021-01-01 RX ADMIN — ONDANSETRON 4 MILLIGRAM(S): 8 TABLET, FILM COATED ORAL at 18:16

## 2021-01-01 RX ADMIN — HEPARIN SODIUM 5000 UNIT(S): 5000 INJECTION INTRAVENOUS; SUBCUTANEOUS at 13:52

## 2021-01-01 RX ADMIN — Medication 1: at 12:45

## 2021-01-01 RX ADMIN — ONDANSETRON 8 MILLIGRAM(S): 8 TABLET, FILM COATED ORAL at 05:04

## 2021-01-01 RX ADMIN — Medication 1500 MILLILITER(S): at 12:54

## 2021-01-01 RX ADMIN — MEROPENEM 100 MILLIGRAM(S): 1 INJECTION INTRAVENOUS at 13:02

## 2021-01-01 RX ADMIN — Medication 5 MILLIGRAM(S): at 21:35

## 2021-01-01 RX ADMIN — Medication 20 MILLIGRAM(S): at 15:19

## 2021-01-01 RX ADMIN — Medication 318.75 MILLIGRAM(S): at 05:39

## 2021-01-01 RX ADMIN — HEPARIN SODIUM 5000 UNIT(S): 5000 INJECTION INTRAVENOUS; SUBCUTANEOUS at 21:20

## 2021-01-01 RX ADMIN — WARFARIN SODIUM 5 MILLIGRAM(S): 2.5 TABLET ORAL at 22:19

## 2021-01-01 RX ADMIN — SPIRONOLACTONE 12.5 MILLIGRAM(S): 25 TABLET, FILM COATED ORAL at 07:17

## 2021-01-01 RX ADMIN — DEXMEDETOMIDINE HYDROCHLORIDE IN 0.9% SODIUM CHLORIDE 23.1 MICROGRAM(S)/KG/HR: 4 INJECTION INTRAVENOUS at 21:53

## 2021-01-01 RX ADMIN — Medication 2: at 00:58

## 2021-01-01 RX ADMIN — HEPARIN SODIUM 5000 UNIT(S): 5000 INJECTION INTRAVENOUS; SUBCUTANEOUS at 06:19

## 2021-01-01 RX ADMIN — Medication 12.5 MILLIGRAM(S): at 17:34

## 2021-01-01 RX ADMIN — KETAMINE HYDROCHLORIDE 30 MILLIGRAM(S): 100 INJECTION INTRAMUSCULAR; INTRAVENOUS at 11:45

## 2021-01-01 RX ADMIN — CEFEPIME 100 MILLIGRAM(S): 1 INJECTION, POWDER, FOR SOLUTION INTRAMUSCULAR; INTRAVENOUS at 06:50

## 2021-01-01 RX ADMIN — CHLORHEXIDINE GLUCONATE 15 MILLILITER(S): 213 SOLUTION TOPICAL at 18:43

## 2021-01-01 RX ADMIN — Medication 500 MILLIGRAM(S): at 05:01

## 2021-01-01 RX ADMIN — Medication 100 MILLIGRAM(S): at 12:59

## 2021-01-01 RX ADMIN — Medication 125 MILLILITER(S): at 16:00

## 2021-01-01 RX ADMIN — Medication 3 MILLILITER(S): at 05:13

## 2021-01-01 RX ADMIN — KETAMINE HYDROCHLORIDE 3.93 MG/KG/HR: 100 INJECTION INTRAMUSCULAR; INTRAVENOUS at 08:03

## 2021-01-01 RX ADMIN — VASOPRESSIN 6 UNIT(S)/MIN: 20 INJECTION INTRAVENOUS at 18:11

## 2021-01-01 RX ADMIN — Medication 250 MILLIGRAM(S): at 20:46

## 2021-01-01 RX ADMIN — SODIUM CHLORIDE 50 MILLILITER(S): 9 INJECTION, SOLUTION INTRAVENOUS at 21:11

## 2021-01-01 RX ADMIN — ONDANSETRON 8 MILLIGRAM(S): 8 TABLET, FILM COATED ORAL at 13:59

## 2021-01-01 RX ADMIN — Medication 10 MILLIGRAM(S): at 13:01

## 2021-01-01 RX ADMIN — Medication 20 MILLIGRAM(S): at 05:17

## 2021-01-01 RX ADMIN — Medication 400 MILLIGRAM(S): at 16:57

## 2021-01-01 RX ADMIN — Medication 125 MILLIGRAM(S): at 05:37

## 2021-01-01 RX ADMIN — MAGNESIUM OXIDE 400 MG ORAL TABLET 400 MILLIGRAM(S): 241.3 TABLET ORAL at 06:14

## 2021-01-01 RX ADMIN — Medication 100 MILLIGRAM(S): at 21:21

## 2021-01-01 RX ADMIN — Medication 50 MILLIEQUIVALENT(S): at 23:36

## 2021-01-01 RX ADMIN — Medication 4: at 17:41

## 2021-01-01 RX ADMIN — CHOLESTYRAMINE 4 GRAM(S): 4 POWDER, FOR SUSPENSION ORAL at 05:06

## 2021-01-01 RX ADMIN — Medication 1000 MILLIGRAM(S): at 02:00

## 2021-01-01 RX ADMIN — Medication 20 MILLIGRAM(S): at 08:51

## 2021-01-01 RX ADMIN — Medication 10 MILLIGRAM(S): at 06:56

## 2021-01-01 RX ADMIN — Medication 62.5 MILLIMOLE(S): at 04:51

## 2021-01-01 RX ADMIN — Medication 2: at 17:12

## 2021-01-01 RX ADMIN — PIPERACILLIN AND TAZOBACTAM 25 GRAM(S): 4; .5 INJECTION, POWDER, LYOPHILIZED, FOR SOLUTION INTRAVENOUS at 23:09

## 2021-01-01 RX ADMIN — PHENYLEPHRINE HYDROCHLORIDE 6.27 MICROGRAM(S)/KG/MIN: 10 INJECTION INTRAVENOUS at 08:27

## 2021-01-01 RX ADMIN — WARFARIN SODIUM 5 MILLIGRAM(S): 2.5 TABLET ORAL at 21:01

## 2021-01-01 RX ADMIN — Medication 320 MILLIGRAM(S): at 21:44

## 2021-01-01 RX ADMIN — Medication 1 TABLET(S): at 13:34

## 2021-01-01 RX ADMIN — Medication 125 MILLIGRAM(S): at 06:36

## 2021-01-01 RX ADMIN — Medication 650 MILLIGRAM(S): at 21:59

## 2021-01-01 RX ADMIN — Medication 10 MILLIGRAM(S): at 13:07

## 2021-01-01 RX ADMIN — Medication 1 PACKET(S): at 11:40

## 2021-01-01 RX ADMIN — INSULIN HUMAN 3 UNIT(S)/HR: 100 INJECTION, SOLUTION SUBCUTANEOUS at 22:03

## 2021-01-01 RX ADMIN — Medication 2: at 06:10

## 2021-01-01 RX ADMIN — Medication 500 MILLIGRAM(S): at 00:23

## 2021-01-01 RX ADMIN — ONDANSETRON 4 MILLIGRAM(S): 8 TABLET, FILM COATED ORAL at 01:31

## 2021-01-01 RX ADMIN — Medication 10 MILLIGRAM(S): at 21:30

## 2021-01-01 RX ADMIN — Medication 37 MICROGRAM(S)/KG/MIN: at 07:01

## 2021-01-01 RX ADMIN — ONDANSETRON 8 MILLIGRAM(S): 8 TABLET, FILM COATED ORAL at 14:11

## 2021-01-01 RX ADMIN — Medication 1: at 17:47

## 2021-01-01 RX ADMIN — Medication 10 MILLIGRAM(S): at 22:00

## 2021-01-01 RX ADMIN — SODIUM CHLORIDE 30 MILLILITER(S): 9 INJECTION, SOLUTION INTRAVENOUS at 07:37

## 2021-01-01 RX ADMIN — AMIODARONE HYDROCHLORIDE 200 MILLIGRAM(S): 400 TABLET ORAL at 05:30

## 2021-01-01 RX ADMIN — Medication 125 MILLIGRAM(S): at 17:35

## 2021-01-01 RX ADMIN — Medication 50 MILLIEQUIVALENT(S): at 21:37

## 2021-01-01 RX ADMIN — HEPARIN SODIUM 5000 UNIT(S): 5000 INJECTION INTRAVENOUS; SUBCUTANEOUS at 21:06

## 2021-01-01 RX ADMIN — MIRTAZAPINE 7.5 MILLIGRAM(S): 45 TABLET, ORALLY DISINTEGRATING ORAL at 21:05

## 2021-01-01 RX ADMIN — HYDROMORPHONE HYDROCHLORIDE 0.25 MILLIGRAM(S): 2 INJECTION INTRAMUSCULAR; INTRAVENOUS; SUBCUTANEOUS at 09:00

## 2021-01-01 RX ADMIN — SCOPALAMINE 1 PATCH: 1 PATCH, EXTENDED RELEASE TRANSDERMAL at 07:58

## 2021-01-01 RX ADMIN — FENTANYL CITRATE 25 MICROGRAM(S): 50 INJECTION INTRAVENOUS at 20:14

## 2021-01-01 RX ADMIN — HUMAN INSULIN 6 UNIT(S): 100 INJECTION, SUSPENSION SUBCUTANEOUS at 11:40

## 2021-01-01 RX ADMIN — Medication 1 TABLET(S): at 05:33

## 2021-01-01 RX ADMIN — HEPARIN SODIUM 5000 UNIT(S): 5000 INJECTION INTRAVENOUS; SUBCUTANEOUS at 14:11

## 2021-01-01 RX ADMIN — Medication 0.5 MILLIGRAM(S): at 05:07

## 2021-01-01 RX ADMIN — Medication 100 MILLIGRAM(S): at 05:08

## 2021-01-01 RX ADMIN — Medication 6 MILLIGRAM(S): at 05:58

## 2021-01-01 RX ADMIN — PANTOPRAZOLE SODIUM 40 MILLIGRAM(S): 20 TABLET, DELAYED RELEASE ORAL at 12:27

## 2021-01-01 RX ADMIN — Medication 100 MILLIGRAM(S): at 21:30

## 2021-01-01 RX ADMIN — Medication 125 MILLIGRAM(S): at 18:15

## 2021-01-01 RX ADMIN — ONDANSETRON 4 MILLIGRAM(S): 8 TABLET, FILM COATED ORAL at 08:42

## 2021-01-01 RX ADMIN — Medication 5 MILLIGRAM(S): at 14:59

## 2021-01-01 RX ADMIN — Medication 25 MILLIGRAM(S): at 17:48

## 2021-01-01 RX ADMIN — ONDANSETRON 4 MILLIGRAM(S): 8 TABLET, FILM COATED ORAL at 16:00

## 2021-01-01 RX ADMIN — SCOPALAMINE 1 PATCH: 1 PATCH, EXTENDED RELEASE TRANSDERMAL at 10:37

## 2021-01-01 RX ADMIN — Medication 100 MILLIGRAM(S): at 12:45

## 2021-01-01 RX ADMIN — Medication 500 MILLIGRAM(S): at 17:36

## 2021-01-01 RX ADMIN — MIRTAZAPINE 7.5 MILLIGRAM(S): 45 TABLET, ORALLY DISINTEGRATING ORAL at 21:21

## 2021-01-01 RX ADMIN — Medication 318.75 MILLIGRAM(S): at 01:06

## 2021-01-01 RX ADMIN — Medication 50 MILLIEQUIVALENT(S): at 23:00

## 2021-01-01 RX ADMIN — Medication 10 MILLIGRAM(S): at 23:03

## 2021-01-01 RX ADMIN — MIRTAZAPINE 7.5 MILLIGRAM(S): 45 TABLET, ORALLY DISINTEGRATING ORAL at 11:58

## 2021-01-01 RX ADMIN — OCTREOTIDE ACETATE 50 MICROGRAM(S): 200 INJECTION, SOLUTION INTRAVENOUS; SUBCUTANEOUS at 05:55

## 2021-01-01 RX ADMIN — Medication 100 MILLIGRAM(S): at 13:02

## 2021-01-01 RX ADMIN — MEROPENEM 100 MILLIGRAM(S): 1 INJECTION INTRAVENOUS at 21:23

## 2021-01-01 RX ADMIN — PROPOFOL 9.42 MICROGRAM(S)/KG/MIN: 10 INJECTION, EMULSION INTRAVENOUS at 21:48

## 2021-01-01 RX ADMIN — INSULIN HUMAN 5 UNIT(S): 100 INJECTION, SOLUTION SUBCUTANEOUS at 22:37

## 2021-01-01 RX ADMIN — Medication 50 MILLIEQUIVALENT(S): at 09:48

## 2021-01-01 RX ADMIN — Medication 50 MILLIEQUIVALENT(S): at 02:05

## 2021-01-01 RX ADMIN — Medication 3 MILLILITER(S): at 11:37

## 2021-01-01 RX ADMIN — Medication 40 MILLIGRAM(S): at 08:21

## 2021-01-01 RX ADMIN — PANTOPRAZOLE SODIUM 40 MILLIGRAM(S): 20 TABLET, DELAYED RELEASE ORAL at 06:19

## 2021-01-01 RX ADMIN — Medication 5 MILLIGRAM(S): at 23:16

## 2021-01-01 RX ADMIN — Medication 2.5 MILLIGRAM(S): at 17:48

## 2021-01-01 RX ADMIN — MIRTAZAPINE 7.5 MILLIGRAM(S): 45 TABLET, ORALLY DISINTEGRATING ORAL at 21:36

## 2021-01-01 RX ADMIN — Medication 125 MILLIGRAM(S): at 17:38

## 2021-01-01 RX ADMIN — Medication 10 MILLIGRAM(S): at 14:11

## 2021-01-01 RX ADMIN — LIDOCAINE 1 PATCH: 4 CREAM TOPICAL at 18:43

## 2021-01-01 RX ADMIN — PANTOPRAZOLE SODIUM 40 MILLIGRAM(S): 20 TABLET, DELAYED RELEASE ORAL at 11:24

## 2021-01-01 RX ADMIN — Medication 50 MILLIEQUIVALENT(S): at 00:40

## 2021-01-01 RX ADMIN — SODIUM CHLORIDE 3 MILLILITER(S): 9 INJECTION INTRAMUSCULAR; INTRAVENOUS; SUBCUTANEOUS at 05:25

## 2021-01-01 RX ADMIN — ENOXAPARIN SODIUM 40 MILLIGRAM(S): 100 INJECTION SUBCUTANEOUS at 11:10

## 2021-01-01 RX ADMIN — Medication 50 MILLIEQUIVALENT(S): at 17:52

## 2021-01-01 RX ADMIN — Medication 500 MICROGRAM(S): at 17:36

## 2021-01-01 RX ADMIN — Medication 500 MILLIGRAM(S): at 11:46

## 2021-01-01 RX ADMIN — CEFEPIME 100 MILLIGRAM(S): 1 INJECTION, POWDER, FOR SOLUTION INTRAMUSCULAR; INTRAVENOUS at 09:50

## 2021-01-01 RX ADMIN — SERTRALINE 100 MILLIGRAM(S): 25 TABLET, FILM COATED ORAL at 13:46

## 2021-01-01 RX ADMIN — HEPARIN SODIUM 5000 UNIT(S): 5000 INJECTION INTRAVENOUS; SUBCUTANEOUS at 05:11

## 2021-01-01 RX ADMIN — Medication 2: at 17:47

## 2021-01-01 RX ADMIN — Medication 0.25 MILLIGRAM(S): at 21:48

## 2021-01-01 RX ADMIN — Medication 0.5 MILLIGRAM(S): at 15:26

## 2021-01-01 RX ADMIN — Medication 50 MILLIGRAM(S): at 05:48

## 2021-01-01 RX ADMIN — HYDROMORPHONE HYDROCHLORIDE 1 MILLIGRAM(S): 2 INJECTION INTRAMUSCULAR; INTRAVENOUS; SUBCUTANEOUS at 22:00

## 2021-01-01 RX ADMIN — Medication 10 MILLIGRAM(S): at 05:50

## 2021-01-01 RX ADMIN — SPIRONOLACTONE 25 MILLIGRAM(S): 25 TABLET, FILM COATED ORAL at 05:14

## 2021-01-01 RX ADMIN — Medication 50 MILLIEQUIVALENT(S): at 06:00

## 2021-01-01 RX ADMIN — WARFARIN SODIUM 7 MILLIGRAM(S): 2.5 TABLET ORAL at 22:09

## 2021-01-01 RX ADMIN — ONDANSETRON 8 MILLIGRAM(S): 8 TABLET, FILM COATED ORAL at 14:07

## 2021-01-01 RX ADMIN — Medication 2.5 MILLIGRAM(S): at 05:19

## 2021-01-01 RX ADMIN — WARFARIN SODIUM 3 MILLIGRAM(S): 2.5 TABLET ORAL at 21:23

## 2021-01-01 RX ADMIN — Medication 50 MILLIEQUIVALENT(S): at 05:17

## 2021-01-01 RX ADMIN — Medication 50 MILLIEQUIVALENT(S): at 02:08

## 2021-01-01 RX ADMIN — Medication 100 GRAM(S): at 02:23

## 2021-01-01 RX ADMIN — Medication 1 TABLET(S): at 12:31

## 2021-01-01 RX ADMIN — Medication 250 MILLIGRAM(S): at 18:10

## 2021-01-01 RX ADMIN — Medication 50 MILLIEQUIVALENT(S): at 00:01

## 2021-01-01 RX ADMIN — Medication 25 MILLIGRAM(S): at 13:02

## 2021-01-01 RX ADMIN — AMIODARONE HYDROCHLORIDE 200 MILLIGRAM(S): 400 TABLET ORAL at 05:31

## 2021-01-01 RX ADMIN — Medication 318.75 MILLIGRAM(S): at 00:52

## 2021-01-01 RX ADMIN — Medication 10 MILLIGRAM(S): at 06:25

## 2021-01-01 RX ADMIN — CEFEPIME 100 MILLIGRAM(S): 1 INJECTION, POWDER, FOR SOLUTION INTRAMUSCULAR; INTRAVENOUS at 15:00

## 2021-01-01 RX ADMIN — PIPERACILLIN AND TAZOBACTAM 25 GRAM(S): 4; .5 INJECTION, POWDER, LYOPHILIZED, FOR SOLUTION INTRAVENOUS at 16:26

## 2021-01-01 RX ADMIN — Medication 650 MILLIGRAM(S): at 08:19

## 2021-01-01 RX ADMIN — PANTOPRAZOLE SODIUM 40 MILLIGRAM(S): 20 TABLET, DELAYED RELEASE ORAL at 06:24

## 2021-01-01 RX ADMIN — Medication 25 MILLIGRAM(S): at 06:37

## 2021-01-01 RX ADMIN — ONDANSETRON 8 MILLIGRAM(S): 8 TABLET, FILM COATED ORAL at 21:49

## 2021-01-01 RX ADMIN — CHLORHEXIDINE GLUCONATE 15 MILLILITER(S): 213 SOLUTION TOPICAL at 17:09

## 2021-01-01 RX ADMIN — Medication 40 MILLIEQUIVALENT(S): at 01:56

## 2021-01-01 RX ADMIN — Medication 100 MILLIGRAM(S): at 21:24

## 2021-01-01 RX ADMIN — OCTREOTIDE ACETATE 50 MICROGRAM(S): 200 INJECTION, SOLUTION INTRAVENOUS; SUBCUTANEOUS at 23:18

## 2021-01-01 RX ADMIN — Medication 7.36 MICROGRAM(S)/KG/MIN: at 20:51

## 2021-01-01 RX ADMIN — Medication 50 GRAM(S): at 23:45

## 2021-01-01 RX ADMIN — Medication 62.5 MILLIMOLE(S): at 03:24

## 2021-01-01 RX ADMIN — Medication 12.5 MILLIGRAM(S): at 17:23

## 2021-01-01 RX ADMIN — Medication 50 GRAM(S): at 02:30

## 2021-01-01 RX ADMIN — Medication 50 MILLILITER(S): at 13:00

## 2021-01-01 RX ADMIN — Medication 100 MILLIGRAM(S): at 14:04

## 2021-01-01 RX ADMIN — Medication 5 MILLIGRAM(S): at 22:41

## 2021-01-01 RX ADMIN — CHLORHEXIDINE GLUCONATE 1 APPLICATION(S): 213 SOLUTION TOPICAL at 12:26

## 2021-01-01 RX ADMIN — Medication 318.75 MILLIGRAM(S): at 17:12

## 2021-01-01 RX ADMIN — SERTRALINE 100 MILLIGRAM(S): 25 TABLET, FILM COATED ORAL at 12:46

## 2021-01-01 RX ADMIN — Medication 125 MILLILITER(S): at 06:13

## 2021-01-01 RX ADMIN — Medication 500 MILLIGRAM(S): at 05:25

## 2021-01-01 RX ADMIN — ONDANSETRON 8 MILLIGRAM(S): 8 TABLET, FILM COATED ORAL at 22:11

## 2021-01-01 RX ADMIN — DEXTROSE MONOHYDRATE, SODIUM CHLORIDE, AND POTASSIUM CHLORIDE 50 MILLILITER(S): 50; .745; 4.5 INJECTION, SOLUTION INTRAVENOUS at 18:07

## 2021-01-01 RX ADMIN — Medication 1: at 01:15

## 2021-01-01 RX ADMIN — CEFEPIME 100 MILLIGRAM(S): 1 INJECTION, POWDER, FOR SOLUTION INTRAMUSCULAR; INTRAVENOUS at 19:07

## 2021-01-01 RX ADMIN — Medication 50 MILLIGRAM(S): at 05:51

## 2021-01-01 RX ADMIN — Medication 125 MILLIGRAM(S): at 17:16

## 2021-01-01 RX ADMIN — MEROPENEM 100 MILLIGRAM(S): 1 INJECTION INTRAVENOUS at 14:09

## 2021-01-01 RX ADMIN — Medication 6 MILLIGRAM(S): at 06:29

## 2021-01-01 RX ADMIN — SERTRALINE 100 MILLIGRAM(S): 25 TABLET, FILM COATED ORAL at 11:58

## 2021-01-01 RX ADMIN — SERTRALINE 100 MILLIGRAM(S): 25 TABLET, FILM COATED ORAL at 11:36

## 2021-01-01 RX ADMIN — Medication 25 GRAM(S): at 16:31

## 2021-01-01 RX ADMIN — Medication 50 MILLIEQUIVALENT(S): at 12:45

## 2021-01-01 RX ADMIN — Medication 100 MILLIGRAM(S): at 13:29

## 2021-01-01 RX ADMIN — Medication 12.5 MILLIGRAM(S): at 21:39

## 2021-01-01 RX ADMIN — ENOXAPARIN SODIUM 30 MILLIGRAM(S): 100 INJECTION SUBCUTANEOUS at 12:20

## 2021-01-01 RX ADMIN — Medication 12.5 MILLIGRAM(S): at 05:19

## 2021-01-01 RX ADMIN — Medication 1 TABLET(S): at 12:00

## 2021-01-01 RX ADMIN — MIRTAZAPINE 7.5 MILLIGRAM(S): 45 TABLET, ORALLY DISINTEGRATING ORAL at 21:10

## 2021-01-01 RX ADMIN — CEFEPIME 100 MILLIGRAM(S): 1 INJECTION, POWDER, FOR SOLUTION INTRAMUSCULAR; INTRAVENOUS at 21:49

## 2021-01-01 RX ADMIN — FENTANYL CITRATE 25 MICROGRAM(S): 50 INJECTION INTRAVENOUS at 07:50

## 2021-01-01 RX ADMIN — CHLORHEXIDINE GLUCONATE 1 APPLICATION(S): 213 SOLUTION TOPICAL at 05:30

## 2021-01-01 RX ADMIN — SERTRALINE 100 MILLIGRAM(S): 25 TABLET, FILM COATED ORAL at 12:12

## 2021-01-01 RX ADMIN — SODIUM CHLORIDE 3 MILLILITER(S): 9 INJECTION INTRAMUSCULAR; INTRAVENOUS; SUBCUTANEOUS at 21:41

## 2021-01-01 RX ADMIN — Medication 2.5 MILLIGRAM(S): at 12:35

## 2021-01-01 RX ADMIN — Medication 50 MILLIGRAM(S): at 22:49

## 2021-01-01 RX ADMIN — DEXMEDETOMIDINE HYDROCHLORIDE IN 0.9% SODIUM CHLORIDE 7.7 MICROGRAM(S)/KG/HR: 4 INJECTION INTRAVENOUS at 21:33

## 2021-01-01 RX ADMIN — SODIUM CHLORIDE 3 MILLILITER(S): 9 INJECTION INTRAMUSCULAR; INTRAVENOUS; SUBCUTANEOUS at 21:19

## 2021-01-01 RX ADMIN — ONDANSETRON 8 MILLIGRAM(S): 8 TABLET, FILM COATED ORAL at 05:08

## 2021-01-01 RX ADMIN — Medication 10 MILLIGRAM(S): at 13:42

## 2021-01-01 RX ADMIN — ONDANSETRON 4 MILLIGRAM(S): 8 TABLET, FILM COATED ORAL at 06:11

## 2021-01-01 RX ADMIN — SCOPALAMINE 1 PATCH: 1 PATCH, EXTENDED RELEASE TRANSDERMAL at 09:30

## 2021-01-01 RX ADMIN — Medication 5 MILLIGRAM(S): at 21:55

## 2021-01-01 RX ADMIN — HUMAN INSULIN 10 UNIT(S): 100 INJECTION, SUSPENSION SUBCUTANEOUS at 00:03

## 2021-01-01 RX ADMIN — MAGNESIUM OXIDE 400 MG ORAL TABLET 400 MILLIGRAM(S): 241.3 TABLET ORAL at 05:56

## 2021-01-01 RX ADMIN — Medication 1 TABLET(S): at 13:33

## 2021-01-01 RX ADMIN — Medication 125 MILLIGRAM(S): at 11:33

## 2021-01-01 RX ADMIN — CEFEPIME 100 MILLIGRAM(S): 1 INJECTION, POWDER, FOR SOLUTION INTRAMUSCULAR; INTRAVENOUS at 15:19

## 2021-01-01 RX ADMIN — Medication 1: at 05:50

## 2021-01-01 RX ADMIN — Medication 125 MILLIGRAM(S): at 06:00

## 2021-01-01 RX ADMIN — POLYETHYLENE GLYCOL 3350 17 GRAM(S): 17 POWDER, FOR SOLUTION ORAL at 12:14

## 2021-01-01 RX ADMIN — MAGNESIUM OXIDE 400 MG ORAL TABLET 400 MILLIGRAM(S): 241.3 TABLET ORAL at 06:00

## 2021-01-01 RX ADMIN — Medication 10 MILLIGRAM(S): at 23:33

## 2021-01-01 RX ADMIN — PANTOPRAZOLE SODIUM 40 MILLIGRAM(S): 20 TABLET, DELAYED RELEASE ORAL at 17:37

## 2021-01-01 RX ADMIN — PANTOPRAZOLE SODIUM 40 MILLIGRAM(S): 20 TABLET, DELAYED RELEASE ORAL at 13:51

## 2021-01-01 RX ADMIN — Medication 2: at 12:57

## 2021-01-01 RX ADMIN — CHLORHEXIDINE GLUCONATE 15 MILLILITER(S): 213 SOLUTION TOPICAL at 05:17

## 2021-01-01 RX ADMIN — PANTOPRAZOLE SODIUM 40 MILLIGRAM(S): 20 TABLET, DELAYED RELEASE ORAL at 11:02

## 2021-01-01 RX ADMIN — MIRTAZAPINE 7.5 MILLIGRAM(S): 45 TABLET, ORALLY DISINTEGRATING ORAL at 11:14

## 2021-01-01 RX ADMIN — Medication 10 MILLIGRAM(S): at 20:55

## 2021-01-01 RX ADMIN — CHLORHEXIDINE GLUCONATE 1 APPLICATION(S): 213 SOLUTION TOPICAL at 06:42

## 2021-01-01 RX ADMIN — AMIODARONE HYDROCHLORIDE 200 MILLIGRAM(S): 400 TABLET ORAL at 05:23

## 2021-01-01 RX ADMIN — HYDROMORPHONE HYDROCHLORIDE 1 MILLIGRAM(S): 2 INJECTION INTRAMUSCULAR; INTRAVENOUS; SUBCUTANEOUS at 21:41

## 2021-01-01 RX ADMIN — ENOXAPARIN SODIUM 40 MILLIGRAM(S): 100 INJECTION SUBCUTANEOUS at 06:22

## 2021-01-01 RX ADMIN — Medication 1 PACKET(S): at 11:49

## 2021-01-01 RX ADMIN — AMIODARONE HYDROCHLORIDE 200 MILLIGRAM(S): 400 TABLET ORAL at 05:57

## 2021-01-01 RX ADMIN — Medication 10 MILLIGRAM(S): at 13:17

## 2021-01-01 RX ADMIN — Medication 25 MILLIGRAM(S): at 05:05

## 2021-01-01 RX ADMIN — Medication 500 MILLIGRAM(S): at 00:17

## 2021-01-01 RX ADMIN — Medication 5.22 MICROGRAM(S)/KG/MIN: at 15:30

## 2021-01-01 RX ADMIN — Medication 25 MILLIGRAM(S): at 12:36

## 2021-01-01 RX ADMIN — Medication 10 MILLIGRAM(S): at 13:22

## 2021-01-01 RX ADMIN — CHLORHEXIDINE GLUCONATE 15 MILLILITER(S): 213 SOLUTION TOPICAL at 06:04

## 2021-01-01 RX ADMIN — CHLORHEXIDINE GLUCONATE 15 MILLILITER(S): 213 SOLUTION TOPICAL at 17:06

## 2021-01-01 RX ADMIN — CHLORHEXIDINE GLUCONATE 1 APPLICATION(S): 213 SOLUTION TOPICAL at 05:00

## 2021-01-01 RX ADMIN — Medication 125 MILLILITER(S): at 10:23

## 2021-01-01 RX ADMIN — PANTOPRAZOLE SODIUM 40 MILLIGRAM(S): 20 TABLET, DELAYED RELEASE ORAL at 17:25

## 2021-01-01 RX ADMIN — Medication 50 MILLIGRAM(S): at 14:00

## 2021-01-01 RX ADMIN — PANTOPRAZOLE SODIUM 40 MILLIGRAM(S): 20 TABLET, DELAYED RELEASE ORAL at 17:00

## 2021-01-01 RX ADMIN — LOSARTAN POTASSIUM 25 MILLIGRAM(S): 100 TABLET, FILM COATED ORAL at 06:37

## 2021-01-01 RX ADMIN — Medication 10 MILLIGRAM(S): at 22:10

## 2021-01-01 RX ADMIN — PANTOPRAZOLE SODIUM 40 MILLIGRAM(S): 20 TABLET, DELAYED RELEASE ORAL at 17:29

## 2021-01-01 RX ADMIN — Medication 50 MILLIGRAM(S): at 12:51

## 2021-01-01 RX ADMIN — SODIUM CHLORIDE 3 MILLILITER(S): 9 INJECTION INTRAMUSCULAR; INTRAVENOUS; SUBCUTANEOUS at 13:56

## 2021-01-01 RX ADMIN — SPIRONOLACTONE 25 MILLIGRAM(S): 25 TABLET, FILM COATED ORAL at 08:41

## 2021-01-01 RX ADMIN — Medication 500 MILLIGRAM(S): at 23:04

## 2021-01-01 RX ADMIN — Medication 50 MILLIEQUIVALENT(S): at 22:30

## 2021-01-01 RX ADMIN — HEPARIN SODIUM 12.5 UNIT(S)/HR: 5000 INJECTION INTRAVENOUS; SUBCUTANEOUS at 19:39

## 2021-01-01 RX ADMIN — POTASSIUM PHOSPHATE, MONOBASIC POTASSIUM PHOSPHATE, DIBASIC 62.5 MILLIMOLE(S): 236; 224 INJECTION, SOLUTION INTRAVENOUS at 09:21

## 2021-01-01 RX ADMIN — Medication 10 MILLIGRAM(S): at 22:49

## 2021-01-01 RX ADMIN — Medication 50 GRAM(S): at 20:28

## 2021-01-01 RX ADMIN — CEFEPIME 100 MILLIGRAM(S): 1 INJECTION, POWDER, FOR SOLUTION INTRAMUSCULAR; INTRAVENOUS at 05:53

## 2021-01-01 RX ADMIN — PHENYLEPHRINE HYDROCHLORIDE 6.27 MICROGRAM(S)/KG/MIN: 10 INJECTION INTRAVENOUS at 02:50

## 2021-01-01 RX ADMIN — Medication 2: at 12:15

## 2021-01-01 RX ADMIN — Medication 2: at 07:00

## 2021-01-01 RX ADMIN — Medication 125 MILLILITER(S): at 10:00

## 2021-01-01 RX ADMIN — Medication 20 MILLIGRAM(S): at 09:55

## 2021-01-01 RX ADMIN — Medication 10 MILLIGRAM(S): at 21:25

## 2021-01-01 RX ADMIN — FENTANYL CITRATE 25 MICROGRAM(S): 50 INJECTION INTRAVENOUS at 00:00

## 2021-01-01 RX ADMIN — Medication 650 MILLIGRAM(S): at 10:02

## 2021-01-01 RX ADMIN — SERTRALINE 100 MILLIGRAM(S): 25 TABLET, FILM COATED ORAL at 11:20

## 2021-01-01 RX ADMIN — Medication 60 MILLIGRAM(S): at 17:38

## 2021-01-01 RX ADMIN — Medication 125 MILLIGRAM(S): at 05:17

## 2021-01-01 RX ADMIN — Medication 10 MILLIGRAM(S): at 05:47

## 2021-01-01 RX ADMIN — Medication 1: at 01:31

## 2021-01-01 RX ADMIN — CHLORHEXIDINE GLUCONATE 15 MILLILITER(S): 213 SOLUTION TOPICAL at 04:57

## 2021-01-01 RX ADMIN — PROPOFOL 9.42 MICROGRAM(S)/KG/MIN: 10 INJECTION, EMULSION INTRAVENOUS at 14:10

## 2021-01-01 RX ADMIN — ONDANSETRON 4 MILLIGRAM(S): 8 TABLET, FILM COATED ORAL at 01:09

## 2021-01-01 RX ADMIN — PANTOPRAZOLE SODIUM 40 MILLIGRAM(S): 20 TABLET, DELAYED RELEASE ORAL at 17:09

## 2021-01-01 RX ADMIN — Medication 318.75 MILLIGRAM(S): at 07:16

## 2021-01-01 RX ADMIN — Medication 10 MILLIGRAM(S): at 10:46

## 2021-01-01 RX ADMIN — Medication 62.5 MILLIMOLE(S): at 05:45

## 2021-01-01 RX ADMIN — Medication 500 MILLIGRAM(S): at 05:02

## 2021-01-01 RX ADMIN — Medication 12.5 MILLIGRAM(S): at 06:19

## 2021-01-01 RX ADMIN — Medication 2: at 09:26

## 2021-01-01 RX ADMIN — Medication 40 MILLIEQUIVALENT(S): at 01:53

## 2021-01-01 RX ADMIN — Medication 500 MICROGRAM(S): at 23:45

## 2021-01-01 RX ADMIN — Medication 1: at 00:40

## 2021-01-01 RX ADMIN — PANTOPRAZOLE SODIUM 40 MILLIGRAM(S): 20 TABLET, DELAYED RELEASE ORAL at 05:57

## 2021-01-01 RX ADMIN — FENTANYL CITRATE 25 MICROGRAM(S): 50 INJECTION INTRAVENOUS at 23:30

## 2021-01-01 RX ADMIN — Medication 9.42 MICROGRAM(S)/KG/MIN: at 08:45

## 2021-01-01 RX ADMIN — Medication 25 GRAM(S): at 09:09

## 2021-01-01 RX ADMIN — HUMAN INSULIN 10 UNIT(S): 100 INJECTION, SUSPENSION SUBCUTANEOUS at 23:35

## 2021-01-01 RX ADMIN — Medication 125 MILLIGRAM(S): at 11:13

## 2021-01-01 RX ADMIN — Medication 100 GRAM(S): at 12:33

## 2021-01-01 RX ADMIN — SCOPALAMINE 1 PATCH: 1 PATCH, EXTENDED RELEASE TRANSDERMAL at 07:01

## 2021-01-01 RX ADMIN — Medication 100 GRAM(S): at 13:59

## 2021-01-01 RX ADMIN — Medication 5 MILLIGRAM(S): at 22:22

## 2021-01-01 RX ADMIN — CEFEPIME 100 MILLIGRAM(S): 1 INJECTION, POWDER, FOR SOLUTION INTRAMUSCULAR; INTRAVENOUS at 13:02

## 2021-01-01 RX ADMIN — Medication 125 MILLIGRAM(S): at 12:45

## 2021-01-01 RX ADMIN — Medication 25 MILLIGRAM(S): at 05:44

## 2021-01-01 RX ADMIN — PANTOPRAZOLE SODIUM 40 MILLIGRAM(S): 20 TABLET, DELAYED RELEASE ORAL at 18:36

## 2021-01-01 RX ADMIN — HUMAN INSULIN 6 UNIT(S): 100 INJECTION, SUSPENSION SUBCUTANEOUS at 00:06

## 2021-01-01 RX ADMIN — Medication 2: at 00:06

## 2021-01-01 RX ADMIN — Medication 2 MILLIGRAM(S): at 05:03

## 2021-01-01 RX ADMIN — Medication 0.5 MILLIGRAM(S): at 21:58

## 2021-01-01 RX ADMIN — Medication 100 MILLIGRAM(S): at 12:00

## 2021-01-01 RX ADMIN — SCOPALAMINE 1 PATCH: 1 PATCH, EXTENDED RELEASE TRANSDERMAL at 19:45

## 2021-01-01 RX ADMIN — SODIUM CHLORIDE 3 MILLILITER(S): 9 INJECTION INTRAMUSCULAR; INTRAVENOUS; SUBCUTANEOUS at 21:35

## 2021-01-01 RX ADMIN — Medication 20 MILLIGRAM(S): at 06:19

## 2021-01-01 RX ADMIN — Medication 50 GRAM(S): at 02:59

## 2021-01-01 RX ADMIN — Medication 10 MILLIGRAM(S): at 14:55

## 2021-01-01 RX ADMIN — SODIUM CHLORIDE 3 MILLILITER(S): 9 INJECTION INTRAMUSCULAR; INTRAVENOUS; SUBCUTANEOUS at 22:52

## 2021-01-01 RX ADMIN — Medication 1000 MILLIGRAM(S): at 17:30

## 2021-01-01 RX ADMIN — Medication 100 MILLIGRAM(S): at 00:18

## 2021-01-01 RX ADMIN — Medication 20 MILLIGRAM(S): at 14:58

## 2021-01-01 RX ADMIN — CEFEPIME 100 MILLIGRAM(S): 1 INJECTION, POWDER, FOR SOLUTION INTRAMUSCULAR; INTRAVENOUS at 17:35

## 2021-01-01 RX ADMIN — Medication 5 MILLIGRAM(S): at 22:08

## 2021-01-01 RX ADMIN — Medication 81 MILLIGRAM(S): at 11:36

## 2021-01-01 RX ADMIN — CEFEPIME 100 MILLIGRAM(S): 1 INJECTION, POWDER, FOR SOLUTION INTRAMUSCULAR; INTRAVENOUS at 05:41

## 2021-01-01 RX ADMIN — Medication 20 MILLIGRAM(S): at 12:51

## 2021-01-01 RX ADMIN — SCOPALAMINE 1 PATCH: 1 PATCH, EXTENDED RELEASE TRANSDERMAL at 10:33

## 2021-01-01 RX ADMIN — Medication 3: at 06:22

## 2021-01-01 RX ADMIN — Medication 400 MILLIGRAM(S): at 18:05

## 2021-01-01 RX ADMIN — HUMAN INSULIN 4 UNIT(S): 100 INJECTION, SUSPENSION SUBCUTANEOUS at 00:07

## 2021-01-01 RX ADMIN — Medication 125 MILLILITER(S): at 04:25

## 2021-01-01 RX ADMIN — Medication 500 MILLIGRAM(S): at 17:40

## 2021-01-01 RX ADMIN — Medication 2 MILLIGRAM(S): at 06:19

## 2021-01-01 RX ADMIN — Medication 50 MILLIEQUIVALENT(S): at 21:53

## 2021-01-01 RX ADMIN — PANTOPRAZOLE SODIUM 40 MILLIGRAM(S): 20 TABLET, DELAYED RELEASE ORAL at 05:22

## 2021-01-01 RX ADMIN — Medication 318.75 MILLIGRAM(S): at 13:14

## 2021-01-01 RX ADMIN — Medication 1 TABLET(S): at 12:02

## 2021-01-01 RX ADMIN — Medication 2: at 12:48

## 2021-01-01 RX ADMIN — Medication 100 MILLIGRAM(S): at 11:36

## 2021-01-01 RX ADMIN — Medication 125 MILLIGRAM(S): at 17:45

## 2021-01-01 RX ADMIN — Medication 100 MILLIGRAM(S): at 13:21

## 2021-01-01 RX ADMIN — PANTOPRAZOLE SODIUM 40 MILLIGRAM(S): 20 TABLET, DELAYED RELEASE ORAL at 06:38

## 2021-01-01 RX ADMIN — Medication 400 MILLIGRAM(S): at 16:50

## 2021-01-01 RX ADMIN — Medication 500 MILLIGRAM(S): at 13:36

## 2021-01-01 RX ADMIN — PANTOPRAZOLE SODIUM 40 MILLIGRAM(S): 20 TABLET, DELAYED RELEASE ORAL at 18:11

## 2021-01-01 RX ADMIN — Medication 125 MILLILITER(S): at 03:00

## 2021-01-01 RX ADMIN — PANTOPRAZOLE SODIUM 40 MILLIGRAM(S): 20 TABLET, DELAYED RELEASE ORAL at 18:18

## 2021-01-01 RX ADMIN — MAGNESIUM OXIDE 400 MG ORAL TABLET 400 MILLIGRAM(S): 241.3 TABLET ORAL at 17:10

## 2021-01-01 RX ADMIN — Medication 2: at 11:41

## 2021-01-01 RX ADMIN — Medication 100 MILLIGRAM(S): at 05:09

## 2021-01-01 RX ADMIN — Medication 50 MILLILITER(S): at 03:01

## 2021-01-01 RX ADMIN — Medication 650 MILLIGRAM(S): at 08:23

## 2021-01-01 RX ADMIN — SODIUM CHLORIDE 3 MILLILITER(S): 9 INJECTION INTRAMUSCULAR; INTRAVENOUS; SUBCUTANEOUS at 12:59

## 2021-01-01 RX ADMIN — Medication 6 MILLIGRAM(S): at 14:21

## 2021-01-01 RX ADMIN — ONDANSETRON 8 MILLIGRAM(S): 8 TABLET, FILM COATED ORAL at 13:52

## 2021-01-01 RX ADMIN — Medication 50 MILLIEQUIVALENT(S): at 03:19

## 2021-01-01 RX ADMIN — HUMAN INSULIN 4 UNIT(S): 100 INJECTION, SUSPENSION SUBCUTANEOUS at 18:36

## 2021-01-01 RX ADMIN — CHLORHEXIDINE GLUCONATE 15 MILLILITER(S): 213 SOLUTION TOPICAL at 17:27

## 2021-01-01 RX ADMIN — HEPARIN SODIUM 5000 UNIT(S): 5000 INJECTION INTRAVENOUS; SUBCUTANEOUS at 06:04

## 2021-01-01 RX ADMIN — Medication 5 MILLIGRAM(S): at 13:26

## 2021-01-01 RX ADMIN — Medication 10 MILLIGRAM(S): at 06:51

## 2021-01-01 RX ADMIN — Medication 5 MILLIGRAM(S): at 14:09

## 2021-01-01 RX ADMIN — HEPARIN SODIUM 12.5 UNIT(S)/HR: 5000 INJECTION INTRAVENOUS; SUBCUTANEOUS at 20:16

## 2021-01-01 RX ADMIN — Medication 100 MILLIGRAM(S): at 12:46

## 2021-01-01 RX ADMIN — Medication 25 GRAM(S): at 08:01

## 2021-01-01 RX ADMIN — Medication 62.5 MILLIMOLE(S): at 03:36

## 2021-01-01 RX ADMIN — PIPERACILLIN AND TAZOBACTAM 25 GRAM(S): 4; .5 INJECTION, POWDER, LYOPHILIZED, FOR SOLUTION INTRAVENOUS at 16:35

## 2021-01-01 RX ADMIN — Medication 0.5 MILLIGRAM(S): at 21:23

## 2021-01-01 RX ADMIN — Medication 25 MILLIGRAM(S): at 13:04

## 2021-01-01 RX ADMIN — Medication 40 MILLIGRAM(S): at 17:03

## 2021-01-01 RX ADMIN — Medication 50 MILLIGRAM(S): at 13:06

## 2021-01-01 RX ADMIN — Medication 20 MILLIGRAM(S): at 05:59

## 2021-01-01 RX ADMIN — VASOPRESSIN 2.4 UNIT(S)/MIN: 20 INJECTION INTRAVENOUS at 08:16

## 2021-01-01 RX ADMIN — Medication 250 MILLIGRAM(S): at 07:58

## 2021-01-01 RX ADMIN — ENOXAPARIN SODIUM 40 MILLIGRAM(S): 100 INJECTION SUBCUTANEOUS at 17:01

## 2021-01-01 RX ADMIN — Medication 1 MILLIGRAM(S): at 05:26

## 2021-01-01 RX ADMIN — SODIUM CHLORIDE 3 MILLILITER(S): 9 INJECTION INTRAMUSCULAR; INTRAVENOUS; SUBCUTANEOUS at 15:05

## 2021-01-01 RX ADMIN — FENTANYL CITRATE 25 MICROGRAM(S): 50 INJECTION INTRAVENOUS at 19:30

## 2021-01-01 RX ADMIN — CHLORHEXIDINE GLUCONATE 15 MILLILITER(S): 213 SOLUTION TOPICAL at 17:12

## 2021-01-01 RX ADMIN — Medication 250 MILLIGRAM(S): at 21:58

## 2021-01-01 RX ADMIN — Medication 0.5 MILLIGRAM(S): at 21:01

## 2021-01-01 RX ADMIN — CEFEPIME 100 MILLIGRAM(S): 1 INJECTION, POWDER, FOR SOLUTION INTRAMUSCULAR; INTRAVENOUS at 09:48

## 2021-01-01 RX ADMIN — Medication 25 MILLIGRAM(S): at 17:07

## 2021-01-01 RX ADMIN — SPIRONOLACTONE 25 MILLIGRAM(S): 25 TABLET, FILM COATED ORAL at 06:14

## 2021-01-01 RX ADMIN — AMIODARONE HYDROCHLORIDE 200 MILLIGRAM(S): 400 TABLET ORAL at 05:07

## 2021-01-01 RX ADMIN — Medication 650 MILLIGRAM(S): at 08:56

## 2021-01-01 RX ADMIN — AMIODARONE HYDROCHLORIDE 200 MILLIGRAM(S): 400 TABLET ORAL at 05:32

## 2021-01-01 RX ADMIN — Medication 30 MILLIGRAM(S): at 07:18

## 2021-01-01 RX ADMIN — CHLORHEXIDINE GLUCONATE 15 MILLILITER(S): 213 SOLUTION TOPICAL at 17:17

## 2021-01-01 RX ADMIN — ONDANSETRON 4 MILLIGRAM(S): 8 TABLET, FILM COATED ORAL at 03:08

## 2021-01-01 RX ADMIN — Medication 50 GRAM(S): at 14:32

## 2021-01-01 RX ADMIN — CLOPIDOGREL BISULFATE 300 MILLIGRAM(S): 75 TABLET, FILM COATED ORAL at 21:32

## 2021-01-01 RX ADMIN — HEPARIN SODIUM 5 UNIT(S)/HR: 5000 INJECTION INTRAVENOUS; SUBCUTANEOUS at 16:47

## 2021-01-01 RX ADMIN — PROPOFOL 5 MICROGRAM(S)/KG/MIN: 10 INJECTION, EMULSION INTRAVENOUS at 14:38

## 2021-01-01 RX ADMIN — Medication 500 MILLIGRAM(S): at 13:51

## 2021-01-01 RX ADMIN — Medication 12.5 MILLIGRAM(S): at 21:03

## 2021-01-01 RX ADMIN — Medication 1: at 17:42

## 2021-01-01 RX ADMIN — CHLORHEXIDINE GLUCONATE 15 MILLILITER(S): 213 SOLUTION TOPICAL at 17:00

## 2021-01-01 RX ADMIN — PANTOPRAZOLE SODIUM 10 MG/HR: 20 TABLET, DELAYED RELEASE ORAL at 08:47

## 2021-01-01 RX ADMIN — Medication 3 MILLILITER(S): at 11:17

## 2021-01-01 RX ADMIN — Medication 50 MILLIGRAM(S): at 05:19

## 2021-01-01 RX ADMIN — Medication 10 MILLIGRAM(S): at 13:12

## 2021-01-01 RX ADMIN — HEPARIN SODIUM 5000 UNIT(S): 5000 INJECTION INTRAVENOUS; SUBCUTANEOUS at 05:44

## 2021-01-01 RX ADMIN — MIRTAZAPINE 7.5 MILLIGRAM(S): 45 TABLET, ORALLY DISINTEGRATING ORAL at 23:51

## 2021-01-01 RX ADMIN — FENTANYL CITRATE 25 MICROGRAM(S): 50 INJECTION INTRAVENOUS at 12:35

## 2021-01-01 RX ADMIN — Medication 250 MILLIGRAM(S): at 21:32

## 2021-01-01 RX ADMIN — CHLORHEXIDINE GLUCONATE 1 APPLICATION(S): 213 SOLUTION TOPICAL at 09:14

## 2021-01-01 RX ADMIN — CEFEPIME 100 MILLIGRAM(S): 1 INJECTION, POWDER, FOR SOLUTION INTRAMUSCULAR; INTRAVENOUS at 20:55

## 2021-01-01 RX ADMIN — Medication 81 MILLIGRAM(S): at 11:23

## 2021-01-01 RX ADMIN — SIMETHICONE 80 MILLIGRAM(S): 80 TABLET, CHEWABLE ORAL at 05:11

## 2021-01-01 RX ADMIN — Medication 50 GRAM(S): at 04:16

## 2021-01-01 RX ADMIN — CHLORHEXIDINE GLUCONATE 1 APPLICATION(S): 213 SOLUTION TOPICAL at 21:45

## 2021-01-01 RX ADMIN — Medication 10 MILLIGRAM(S): at 14:10

## 2021-01-01 RX ADMIN — Medication 25 MILLIGRAM(S): at 21:58

## 2021-01-01 RX ADMIN — Medication 25 MILLIGRAM(S): at 05:07

## 2021-01-01 RX ADMIN — SODIUM CHLORIDE 30 MILLILITER(S): 9 INJECTION, SOLUTION INTRAVENOUS at 05:38

## 2021-01-01 RX ADMIN — Medication 10 MILLIGRAM(S): at 22:20

## 2021-01-01 RX ADMIN — VASOPRESSIN 1 UNIT(S)/MIN: 20 INJECTION INTRAVENOUS at 05:37

## 2021-01-01 RX ADMIN — Medication 1500 MILLILITER(S): at 02:58

## 2021-01-01 RX ADMIN — PIPERACILLIN AND TAZOBACTAM 25 GRAM(S): 4; .5 INJECTION, POWDER, LYOPHILIZED, FOR SOLUTION INTRAVENOUS at 08:25

## 2021-01-01 RX ADMIN — HEPARIN SODIUM 5000 UNIT(S): 5000 INJECTION INTRAVENOUS; SUBCUTANEOUS at 15:46

## 2021-01-01 RX ADMIN — Medication 10 MILLIGRAM(S): at 22:30

## 2021-01-01 RX ADMIN — Medication 100 MILLIGRAM(S): at 15:21

## 2021-01-01 RX ADMIN — Medication 40 MILLIGRAM(S): at 10:03

## 2021-01-01 RX ADMIN — Medication 1 PACKET(S): at 12:07

## 2021-01-01 RX ADMIN — MEROPENEM 100 MILLIGRAM(S): 1 INJECTION INTRAVENOUS at 21:21

## 2021-01-01 RX ADMIN — PIPERACILLIN AND TAZOBACTAM 25 GRAM(S): 4; .5 INJECTION, POWDER, LYOPHILIZED, FOR SOLUTION INTRAVENOUS at 16:13

## 2021-01-01 RX ADMIN — Medication 1 MILLIGRAM(S): at 06:07

## 2021-01-01 RX ADMIN — CEFEPIME 100 MILLIGRAM(S): 1 INJECTION, POWDER, FOR SOLUTION INTRAMUSCULAR; INTRAVENOUS at 22:35

## 2021-01-01 RX ADMIN — Medication 0.25 MICROGRAM(S): at 22:15

## 2021-01-01 RX ADMIN — Medication 500 MICROGRAM(S): at 18:10

## 2021-01-01 RX ADMIN — Medication 500 MILLIGRAM(S): at 11:04

## 2021-01-01 RX ADMIN — ONDANSETRON 8 MILLIGRAM(S): 8 TABLET, FILM COATED ORAL at 22:02

## 2021-01-01 RX ADMIN — Medication 318.75 MILLIGRAM(S): at 17:15

## 2021-01-01 RX ADMIN — Medication 81 MILLIGRAM(S): at 12:40

## 2021-01-01 RX ADMIN — Medication 100 MILLIGRAM(S): at 11:19

## 2021-01-01 RX ADMIN — Medication 10 MILLIGRAM(S): at 06:20

## 2021-01-01 RX ADMIN — Medication 5 MILLIGRAM(S): at 08:50

## 2021-01-01 RX ADMIN — CEFEPIME 100 MILLIGRAM(S): 1 INJECTION, POWDER, FOR SOLUTION INTRAMUSCULAR; INTRAVENOUS at 12:59

## 2021-01-01 RX ADMIN — MIRTAZAPINE 7.5 MILLIGRAM(S): 45 TABLET, ORALLY DISINTEGRATING ORAL at 11:08

## 2021-01-01 RX ADMIN — CEFEPIME 100 MILLIGRAM(S): 1 INJECTION, POWDER, FOR SOLUTION INTRAMUSCULAR; INTRAVENOUS at 22:53

## 2021-01-01 RX ADMIN — PANTOPRAZOLE SODIUM 40 MILLIGRAM(S): 20 TABLET, DELAYED RELEASE ORAL at 05:11

## 2021-01-01 RX ADMIN — Medication 125 MILLILITER(S): at 03:15

## 2021-01-01 RX ADMIN — FENTANYL CITRATE 50 MICROGRAM(S): 50 INJECTION INTRAVENOUS at 00:48

## 2021-01-01 RX ADMIN — Medication 10 MILLIGRAM(S): at 15:07

## 2021-01-01 RX ADMIN — SODIUM CHLORIDE 30 MILLILITER(S): 9 INJECTION, SOLUTION INTRAVENOUS at 00:44

## 2021-01-01 RX ADMIN — Medication 5 MILLIGRAM(S): at 21:25

## 2021-01-01 RX ADMIN — POLYETHYLENE GLYCOL 3350 17 GRAM(S): 17 POWDER, FOR SOLUTION ORAL at 11:31

## 2021-01-01 RX ADMIN — Medication 1 MILLIGRAM(S): at 05:00

## 2021-01-01 RX ADMIN — MIRTAZAPINE 7.5 MILLIGRAM(S): 45 TABLET, ORALLY DISINTEGRATING ORAL at 20:44

## 2021-01-01 RX ADMIN — MAGNESIUM OXIDE 400 MG ORAL TABLET 400 MILLIGRAM(S): 241.3 TABLET ORAL at 05:50

## 2021-01-01 RX ADMIN — HUMAN INSULIN 10 UNIT(S): 100 INJECTION, SUSPENSION SUBCUTANEOUS at 17:37

## 2021-01-01 RX ADMIN — SIMETHICONE 80 MILLIGRAM(S): 80 TABLET, CHEWABLE ORAL at 16:22

## 2021-01-01 RX ADMIN — Medication 125 MILLIGRAM(S): at 06:09

## 2021-01-01 RX ADMIN — Medication 50 MILLIGRAM(S): at 06:31

## 2021-01-01 RX ADMIN — Medication 250 MILLIGRAM(S): at 17:51

## 2021-01-01 RX ADMIN — SODIUM CHLORIDE 3 MILLILITER(S): 9 INJECTION INTRAMUSCULAR; INTRAVENOUS; SUBCUTANEOUS at 05:03

## 2021-01-01 RX ADMIN — Medication 1: at 18:00

## 2021-01-01 RX ADMIN — MEROPENEM 100 MILLIGRAM(S): 1 INJECTION INTRAVENOUS at 22:14

## 2021-01-01 RX ADMIN — Medication 500 MICROGRAM(S): at 23:58

## 2021-01-01 RX ADMIN — Medication 1000 MILLIGRAM(S): at 03:15

## 2021-01-01 RX ADMIN — Medication 0.5 MILLIGRAM(S): at 22:40

## 2021-01-01 RX ADMIN — Medication 500 MILLIGRAM(S): at 05:31

## 2021-01-01 RX ADMIN — SODIUM CHLORIDE 3 MILLILITER(S): 9 INJECTION INTRAMUSCULAR; INTRAVENOUS; SUBCUTANEOUS at 21:33

## 2021-01-01 RX ADMIN — HYDROMORPHONE HYDROCHLORIDE 0.5 MILLIGRAM(S): 2 INJECTION INTRAMUSCULAR; INTRAVENOUS; SUBCUTANEOUS at 12:00

## 2021-01-01 RX ADMIN — Medication 10 MILLIGRAM(S): at 01:07

## 2021-01-01 RX ADMIN — HUMAN INSULIN 10 UNIT(S): 100 INJECTION, SUSPENSION SUBCUTANEOUS at 18:10

## 2021-01-01 RX ADMIN — Medication 2.5 MILLIGRAM(S): at 05:26

## 2021-01-01 RX ADMIN — Medication 2: at 17:10

## 2021-01-01 RX ADMIN — DEXMEDETOMIDINE HYDROCHLORIDE IN 0.9% SODIUM CHLORIDE 9.81 MICROGRAM(S)/KG/HR: 4 INJECTION INTRAVENOUS at 05:38

## 2021-01-01 RX ADMIN — SERTRALINE 100 MILLIGRAM(S): 25 TABLET, FILM COATED ORAL at 11:08

## 2021-01-01 RX ADMIN — Medication 125 MILLIGRAM(S): at 17:18

## 2021-01-01 RX ADMIN — PANTOPRAZOLE SODIUM 40 MILLIGRAM(S): 20 TABLET, DELAYED RELEASE ORAL at 05:18

## 2021-01-01 RX ADMIN — SODIUM CHLORIDE 50 MILLILITER(S): 9 INJECTION INTRAMUSCULAR; INTRAVENOUS; SUBCUTANEOUS at 05:38

## 2021-01-01 RX ADMIN — Medication 10 MILLIGRAM(S): at 05:38

## 2021-01-01 RX ADMIN — Medication 1: at 18:06

## 2021-01-01 RX ADMIN — MIRTAZAPINE 7.5 MILLIGRAM(S): 45 TABLET, ORALLY DISINTEGRATING ORAL at 12:38

## 2021-01-01 RX ADMIN — Medication 5 MILLIGRAM(S): at 06:15

## 2021-01-01 RX ADMIN — SERTRALINE 100 MILLIGRAM(S): 25 TABLET, FILM COATED ORAL at 13:20

## 2021-01-01 RX ADMIN — HEPARIN SODIUM 5000 UNIT(S): 5000 INJECTION INTRAVENOUS; SUBCUTANEOUS at 05:53

## 2021-01-01 RX ADMIN — Medication 25 MILLIGRAM(S): at 05:19

## 2021-01-01 RX ADMIN — Medication 1 TABLET(S): at 13:01

## 2021-01-01 RX ADMIN — Medication 10 MILLIGRAM(S): at 12:01

## 2021-01-01 RX ADMIN — Medication 125 MILLIGRAM(S): at 06:16

## 2021-01-01 RX ADMIN — Medication 25 MILLIGRAM(S): at 11:13

## 2021-01-01 RX ADMIN — HEPARIN SODIUM 11 UNIT(S)/HR: 5000 INJECTION INTRAVENOUS; SUBCUTANEOUS at 20:50

## 2021-01-01 RX ADMIN — MIRTAZAPINE 7.5 MILLIGRAM(S): 45 TABLET, ORALLY DISINTEGRATING ORAL at 21:48

## 2021-01-01 RX ADMIN — Medication 125 MILLIGRAM(S): at 12:33

## 2021-01-01 RX ADMIN — ONDANSETRON 4 MILLIGRAM(S): 8 TABLET, FILM COATED ORAL at 11:53

## 2021-01-01 RX ADMIN — Medication 12.5 MILLIGRAM(S): at 17:30

## 2021-01-01 RX ADMIN — Medication 0.5 MILLIGRAM(S): at 21:29

## 2021-01-01 RX ADMIN — SPIRONOLACTONE 12.5 MILLIGRAM(S): 25 TABLET, FILM COATED ORAL at 07:44

## 2021-01-01 RX ADMIN — ONDANSETRON 4 MILLIGRAM(S): 8 TABLET, FILM COATED ORAL at 15:20

## 2021-01-01 RX ADMIN — Medication 10 MILLIGRAM(S): at 13:10

## 2021-01-01 RX ADMIN — SODIUM CHLORIDE 25 MILLILITER(S): 9 INJECTION INTRAMUSCULAR; INTRAVENOUS; SUBCUTANEOUS at 08:28

## 2021-01-01 RX ADMIN — MEROPENEM 100 MILLIGRAM(S): 1 INJECTION INTRAVENOUS at 14:04

## 2021-01-01 RX ADMIN — Medication 500 MILLIGRAM(S): at 06:02

## 2021-01-01 RX ADMIN — PANTOPRAZOLE SODIUM 40 MILLIGRAM(S): 20 TABLET, DELAYED RELEASE ORAL at 18:02

## 2021-01-01 RX ADMIN — Medication 50 GRAM(S): at 11:48

## 2021-01-01 RX ADMIN — DEXTROSE MONOHYDRATE, SODIUM CHLORIDE, AND POTASSIUM CHLORIDE 30 MILLILITER(S): 50; .745; 4.5 INJECTION, SOLUTION INTRAVENOUS at 21:43

## 2021-01-01 RX ADMIN — CEFEPIME 100 MILLIGRAM(S): 1 INJECTION, POWDER, FOR SOLUTION INTRAMUSCULAR; INTRAVENOUS at 13:13

## 2021-01-01 RX ADMIN — MEROPENEM 100 MILLIGRAM(S): 1 INJECTION INTRAVENOUS at 15:28

## 2021-01-01 RX ADMIN — PANTOPRAZOLE SODIUM 40 MILLIGRAM(S): 20 TABLET, DELAYED RELEASE ORAL at 05:06

## 2021-01-01 RX ADMIN — HUMAN INSULIN 4 UNIT(S): 100 INJECTION, SUSPENSION SUBCUTANEOUS at 05:41

## 2021-01-01 RX ADMIN — Medication 250 MILLIGRAM(S): at 05:18

## 2021-01-01 RX ADMIN — Medication 2.89 MICROGRAM(S)/KG/MIN: at 18:09

## 2021-01-01 RX ADMIN — Medication 100 MILLIGRAM(S): at 05:35

## 2021-01-01 RX ADMIN — Medication 0.5 MILLIGRAM(S): at 22:05

## 2021-01-01 RX ADMIN — Medication 1 MILLIGRAM(S): at 06:00

## 2021-01-01 RX ADMIN — Medication 3: at 12:13

## 2021-01-01 RX ADMIN — CHLORHEXIDINE GLUCONATE 15 MILLILITER(S): 213 SOLUTION TOPICAL at 06:25

## 2021-01-01 RX ADMIN — Medication 0.5 MILLIGRAM(S): at 21:08

## 2021-01-01 RX ADMIN — PANTOPRAZOLE SODIUM 40 MILLIGRAM(S): 20 TABLET, DELAYED RELEASE ORAL at 17:35

## 2021-01-01 RX ADMIN — PANTOPRAZOLE SODIUM 40 MILLIGRAM(S): 20 TABLET, DELAYED RELEASE ORAL at 17:49

## 2021-01-01 RX ADMIN — SODIUM CHLORIDE 50 MILLILITER(S): 9 INJECTION, SOLUTION INTRAVENOUS at 00:09

## 2021-01-01 RX ADMIN — Medication 1: at 17:15

## 2021-01-01 RX ADMIN — PANTOPRAZOLE SODIUM 40 MILLIGRAM(S): 20 TABLET, DELAYED RELEASE ORAL at 18:07

## 2021-01-01 RX ADMIN — Medication 10 MILLIGRAM(S): at 13:34

## 2021-01-01 RX ADMIN — Medication 318.75 MILLIGRAM(S): at 06:22

## 2021-01-01 RX ADMIN — ONDANSETRON 8 MILLIGRAM(S): 8 TABLET, FILM COATED ORAL at 05:10

## 2021-01-01 RX ADMIN — Medication 20 MILLIGRAM(S): at 18:04

## 2021-01-01 RX ADMIN — Medication 1 TABLET(S): at 12:26

## 2021-01-01 RX ADMIN — Medication 650 MILLIGRAM(S): at 10:00

## 2021-01-01 RX ADMIN — HUMAN INSULIN 6 UNIT(S): 100 INJECTION, SUSPENSION SUBCUTANEOUS at 17:41

## 2021-01-01 RX ADMIN — SIMETHICONE 80 MILLIGRAM(S): 80 TABLET, CHEWABLE ORAL at 19:39

## 2021-01-01 RX ADMIN — Medication 5 MILLIGRAM(S): at 00:03

## 2021-01-01 RX ADMIN — Medication 50 MILLIGRAM(S): at 06:03

## 2021-01-01 RX ADMIN — MEROPENEM 100 MILLIGRAM(S): 1 INJECTION INTRAVENOUS at 05:24

## 2021-01-01 RX ADMIN — Medication 125 MILLIGRAM(S): at 17:14

## 2021-01-01 RX ADMIN — CEFEPIME 100 MILLIGRAM(S): 1 INJECTION, POWDER, FOR SOLUTION INTRAMUSCULAR; INTRAVENOUS at 05:20

## 2021-01-01 RX ADMIN — Medication 25 GRAM(S): at 09:10

## 2021-01-01 RX ADMIN — Medication 650 MILLIGRAM(S): at 12:33

## 2021-01-01 RX ADMIN — Medication 60 MILLIGRAM(S): at 05:30

## 2021-01-01 RX ADMIN — Medication 5 MILLIGRAM(S): at 19:30

## 2021-01-01 RX ADMIN — KETAMINE HYDROCHLORIDE 30 MILLIGRAM(S): 100 INJECTION INTRAMUSCULAR; INTRAVENOUS at 13:32

## 2021-01-01 RX ADMIN — CHLORHEXIDINE GLUCONATE 1 APPLICATION(S): 213 SOLUTION TOPICAL at 11:15

## 2021-01-01 RX ADMIN — Medication 25 GRAM(S): at 05:56

## 2021-01-01 RX ADMIN — Medication 500 MILLIGRAM(S): at 06:08

## 2021-01-01 RX ADMIN — CEFEPIME 100 MILLIGRAM(S): 1 INJECTION, POWDER, FOR SOLUTION INTRAMUSCULAR; INTRAVENOUS at 13:07

## 2021-01-01 RX ADMIN — SPIRONOLACTONE 12.5 MILLIGRAM(S): 25 TABLET, FILM COATED ORAL at 17:36

## 2021-01-01 RX ADMIN — PANTOPRAZOLE SODIUM 40 MILLIGRAM(S): 20 TABLET, DELAYED RELEASE ORAL at 05:30

## 2021-01-01 RX ADMIN — Medication 1 TABLET(S): at 14:37

## 2021-01-01 RX ADMIN — Medication 12.5 MILLIGRAM(S): at 17:03

## 2021-01-01 RX ADMIN — Medication 318.75 MILLIGRAM(S): at 00:22

## 2021-01-01 RX ADMIN — Medication 50 GRAM(S): at 01:55

## 2021-01-01 RX ADMIN — MAGNESIUM OXIDE 400 MG ORAL TABLET 400 MILLIGRAM(S): 241.3 TABLET ORAL at 05:04

## 2021-01-01 RX ADMIN — AMIODARONE HYDROCHLORIDE 200 MILLIGRAM(S): 400 TABLET ORAL at 05:08

## 2021-01-01 RX ADMIN — SODIUM CHLORIDE 3 MILLILITER(S): 9 INJECTION INTRAMUSCULAR; INTRAVENOUS; SUBCUTANEOUS at 05:23

## 2021-01-01 RX ADMIN — Medication 5 MILLIGRAM(S): at 08:51

## 2021-01-01 RX ADMIN — CEFEPIME 100 MILLIGRAM(S): 1 INJECTION, POWDER, FOR SOLUTION INTRAMUSCULAR; INTRAVENOUS at 05:16

## 2021-01-01 RX ADMIN — Medication 100 MILLIGRAM(S): at 11:18

## 2021-01-01 RX ADMIN — MIRTAZAPINE 7.5 MILLIGRAM(S): 45 TABLET, ORALLY DISINTEGRATING ORAL at 13:22

## 2021-01-01 RX ADMIN — ONDANSETRON 8 MILLIGRAM(S): 8 TABLET, FILM COATED ORAL at 06:20

## 2021-01-01 RX ADMIN — PANTOPRAZOLE SODIUM 40 MILLIGRAM(S): 20 TABLET, DELAYED RELEASE ORAL at 05:05

## 2021-01-01 RX ADMIN — VECURONIUM BROMIDE 5 MILLIGRAM(S): 20 INJECTION, POWDER, FOR SOLUTION INTRAVENOUS at 01:33

## 2021-01-01 RX ADMIN — Medication 1: at 11:56

## 2021-01-01 RX ADMIN — Medication 1 TABLET(S): at 06:34

## 2021-01-01 RX ADMIN — PANTOPRAZOLE SODIUM 40 MILLIGRAM(S): 20 TABLET, DELAYED RELEASE ORAL at 17:04

## 2021-01-01 RX ADMIN — Medication 1000 MILLIGRAM(S): at 22:15

## 2021-01-01 RX ADMIN — PANTOPRAZOLE SODIUM 40 MILLIGRAM(S): 20 TABLET, DELAYED RELEASE ORAL at 17:19

## 2021-01-01 RX ADMIN — MAGNESIUM OXIDE 400 MG ORAL TABLET 400 MILLIGRAM(S): 241.3 TABLET ORAL at 21:21

## 2021-01-01 RX ADMIN — Medication 1 TABLET(S): at 12:53

## 2021-01-01 RX ADMIN — Medication 1 TABLET(S): at 11:10

## 2021-01-01 RX ADMIN — ONDANSETRON 4 MILLIGRAM(S): 8 TABLET, FILM COATED ORAL at 14:45

## 2021-01-01 RX ADMIN — MIRTAZAPINE 7.5 MILLIGRAM(S): 45 TABLET, ORALLY DISINTEGRATING ORAL at 12:23

## 2021-01-01 RX ADMIN — PANTOPRAZOLE SODIUM 40 MILLIGRAM(S): 20 TABLET, DELAYED RELEASE ORAL at 05:31

## 2021-01-01 RX ADMIN — CHLORHEXIDINE GLUCONATE 15 MILLILITER(S): 213 SOLUTION TOPICAL at 05:47

## 2021-01-01 RX ADMIN — Medication 500 MICROGRAM(S): at 00:10

## 2021-01-01 RX ADMIN — Medication 250 MILLIGRAM(S): at 06:57

## 2021-01-01 RX ADMIN — SODIUM CHLORIDE 250 MILLILITER(S): 9 INJECTION INTRAMUSCULAR; INTRAVENOUS; SUBCUTANEOUS at 04:34

## 2021-01-01 RX ADMIN — HEPARIN SODIUM 5000 UNIT(S): 5000 INJECTION INTRAVENOUS; SUBCUTANEOUS at 13:28

## 2021-01-01 RX ADMIN — Medication 50 MILLIGRAM(S): at 05:56

## 2021-01-01 RX ADMIN — Medication 10 MILLIGRAM(S): at 05:18

## 2021-01-01 RX ADMIN — HUMAN INSULIN 6 UNIT(S): 100 INJECTION, SUSPENSION SUBCUTANEOUS at 23:53

## 2021-01-01 RX ADMIN — Medication 50 MILLIEQUIVALENT(S): at 01:56

## 2021-01-01 RX ADMIN — DEXMEDETOMIDINE HYDROCHLORIDE IN 0.9% SODIUM CHLORIDE 9.81 MICROGRAM(S)/KG/HR: 4 INJECTION INTRAVENOUS at 19:39

## 2021-01-01 RX ADMIN — Medication 100 MILLIGRAM(S): at 13:47

## 2021-01-01 RX ADMIN — Medication 5 MILLIGRAM(S): at 06:10

## 2021-01-01 RX ADMIN — CHLORHEXIDINE GLUCONATE 1 APPLICATION(S): 213 SOLUTION TOPICAL at 05:26

## 2021-01-01 RX ADMIN — Medication 1: at 17:20

## 2021-01-01 RX ADMIN — CHLORHEXIDINE GLUCONATE 1 APPLICATION(S): 213 SOLUTION TOPICAL at 05:09

## 2021-01-01 RX ADMIN — Medication 100 MILLIGRAM(S): at 12:39

## 2021-01-01 RX ADMIN — Medication 1 TABLET(S): at 13:47

## 2021-01-01 RX ADMIN — PANTOPRAZOLE SODIUM 40 MILLIGRAM(S): 20 TABLET, DELAYED RELEASE ORAL at 05:00

## 2021-01-01 RX ADMIN — PROPOFOL 5 MICROGRAM(S)/KG/MIN: 10 INJECTION, EMULSION INTRAVENOUS at 18:09

## 2021-01-01 RX ADMIN — Medication 100 MILLIGRAM(S): at 21:34

## 2021-01-01 RX ADMIN — HEPARIN SODIUM 5000 UNIT(S): 5000 INJECTION INTRAVENOUS; SUBCUTANEOUS at 15:21

## 2021-01-01 RX ADMIN — Medication 50 MILLIGRAM(S): at 05:54

## 2021-01-01 RX ADMIN — Medication 2: at 02:45

## 2021-01-01 RX ADMIN — Medication 10 MILLIGRAM(S): at 22:11

## 2021-01-01 RX ADMIN — SODIUM CHLORIDE 40 MILLILITER(S): 9 INJECTION, SOLUTION INTRAVENOUS at 21:35

## 2021-01-01 RX ADMIN — Medication 10 MILLIGRAM(S): at 05:42

## 2021-01-01 RX ADMIN — Medication 50 MILLIGRAM(S): at 21:47

## 2021-01-01 RX ADMIN — Medication 500 MILLIGRAM(S): at 23:21

## 2021-01-01 RX ADMIN — Medication 40 MILLIEQUIVALENT(S): at 14:30

## 2021-01-01 RX ADMIN — HEPARIN SODIUM 5000 UNIT(S): 5000 INJECTION INTRAVENOUS; SUBCUTANEOUS at 22:21

## 2021-01-01 RX ADMIN — Medication 10 MILLIGRAM(S): at 05:28

## 2021-01-01 RX ADMIN — Medication 25 MILLIGRAM(S): at 13:31

## 2021-01-01 RX ADMIN — SERTRALINE 100 MILLIGRAM(S): 25 TABLET, FILM COATED ORAL at 11:30

## 2021-01-01 RX ADMIN — Medication 40 MILLIEQUIVALENT(S): at 10:32

## 2021-01-01 RX ADMIN — Medication 300 MILLIGRAM(S): at 05:57

## 2021-01-01 RX ADMIN — Medication 2: at 06:15

## 2021-01-01 RX ADMIN — CHLORHEXIDINE GLUCONATE 15 MILLILITER(S): 213 SOLUTION TOPICAL at 05:42

## 2021-01-01 RX ADMIN — Medication 318.75 MILLIGRAM(S): at 16:07

## 2021-01-01 RX ADMIN — CHLORHEXIDINE GLUCONATE 15 MILLILITER(S): 213 SOLUTION TOPICAL at 17:52

## 2021-01-01 RX ADMIN — HUMAN INSULIN 10 UNIT(S): 100 INJECTION, SUSPENSION SUBCUTANEOUS at 17:38

## 2021-01-01 RX ADMIN — ONDANSETRON 8 MILLIGRAM(S): 8 TABLET, FILM COATED ORAL at 05:26

## 2021-01-01 RX ADMIN — Medication 650 MILLIGRAM(S): at 12:51

## 2021-01-01 RX ADMIN — Medication 5 MILLIGRAM(S): at 13:15

## 2021-01-01 RX ADMIN — CHLORHEXIDINE GLUCONATE 15 MILLILITER(S): 213 SOLUTION TOPICAL at 18:08

## 2021-01-01 RX ADMIN — DEXMEDETOMIDINE HYDROCHLORIDE IN 0.9% SODIUM CHLORIDE 9.81 MICROGRAM(S)/KG/HR: 4 INJECTION INTRAVENOUS at 07:52

## 2021-01-01 RX ADMIN — Medication 125 MILLILITER(S): at 18:20

## 2021-01-01 RX ADMIN — MAGNESIUM OXIDE 400 MG ORAL TABLET 400 MILLIGRAM(S): 241.3 TABLET ORAL at 17:07

## 2021-01-01 RX ADMIN — PIPERACILLIN AND TAZOBACTAM 25 GRAM(S): 4; .5 INJECTION, POWDER, LYOPHILIZED, FOR SOLUTION INTRAVENOUS at 00:33

## 2021-01-01 RX ADMIN — Medication 500 MILLIGRAM(S): at 05:17

## 2021-01-01 RX ADMIN — SERTRALINE 100 MILLIGRAM(S): 25 TABLET, FILM COATED ORAL at 14:59

## 2021-01-01 RX ADMIN — Medication 83.33 MILLIMOLE(S): at 04:12

## 2021-01-01 RX ADMIN — Medication 50 MILLIGRAM(S): at 06:00

## 2021-01-01 RX ADMIN — MAGNESIUM OXIDE 400 MG ORAL TABLET 400 MILLIGRAM(S): 241.3 TABLET ORAL at 06:03

## 2021-01-01 RX ADMIN — Medication 20 MILLIGRAM(S): at 05:31

## 2021-01-01 RX ADMIN — CEFEPIME 100 MILLIGRAM(S): 1 INJECTION, POWDER, FOR SOLUTION INTRAMUSCULAR; INTRAVENOUS at 21:12

## 2021-01-01 RX ADMIN — Medication 1 TABLET(S): at 12:05

## 2021-01-01 RX ADMIN — Medication 1: at 18:21

## 2021-01-01 RX ADMIN — SCOPALAMINE 1 PATCH: 1 PATCH, EXTENDED RELEASE TRANSDERMAL at 06:56

## 2021-01-01 RX ADMIN — Medication 125 MILLIGRAM(S): at 01:00

## 2021-01-01 RX ADMIN — HUMAN INSULIN 6 UNIT(S): 100 INJECTION, SUSPENSION SUBCUTANEOUS at 00:37

## 2021-01-01 RX ADMIN — Medication 0.5 MILLIGRAM(S): at 05:57

## 2021-01-01 RX ADMIN — Medication 10 MILLIGRAM(S): at 13:20

## 2021-01-01 RX ADMIN — MIRTAZAPINE 7.5 MILLIGRAM(S): 45 TABLET, ORALLY DISINTEGRATING ORAL at 22:36

## 2021-01-01 RX ADMIN — Medication 500 MILLIGRAM(S): at 00:02

## 2021-01-01 RX ADMIN — DEXMEDETOMIDINE HYDROCHLORIDE IN 0.9% SODIUM CHLORIDE 9.81 MICROGRAM(S)/KG/HR: 4 INJECTION INTRAVENOUS at 12:44

## 2021-01-01 RX ADMIN — MIRTAZAPINE 7.5 MILLIGRAM(S): 45 TABLET, ORALLY DISINTEGRATING ORAL at 11:39

## 2021-01-01 RX ADMIN — Medication 10 MILLIGRAM(S): at 14:04

## 2021-01-01 RX ADMIN — KETAMINE HYDROCHLORIDE 3.93 MG/KG/HR: 100 INJECTION INTRAMUSCULAR; INTRAVENOUS at 10:11

## 2021-01-01 RX ADMIN — Medication 10 MILLIGRAM(S): at 14:25

## 2021-01-01 RX ADMIN — Medication 50 GRAM(S): at 04:00

## 2021-01-01 RX ADMIN — SPIRONOLACTONE 25 MILLIGRAM(S): 25 TABLET, FILM COATED ORAL at 06:08

## 2021-01-01 RX ADMIN — CEFEPIME 100 MILLIGRAM(S): 1 INJECTION, POWDER, FOR SOLUTION INTRAMUSCULAR; INTRAVENOUS at 05:08

## 2021-01-01 RX ADMIN — CEFEPIME 100 MILLIGRAM(S): 1 INJECTION, POWDER, FOR SOLUTION INTRAMUSCULAR; INTRAVENOUS at 05:54

## 2021-01-01 RX ADMIN — Medication 50 MILLILITER(S): at 02:58

## 2021-01-01 RX ADMIN — SPIRONOLACTONE 25 MILLIGRAM(S): 25 TABLET, FILM COATED ORAL at 08:50

## 2021-01-01 RX ADMIN — Medication 1 TABLET(S): at 12:17

## 2021-01-01 RX ADMIN — Medication 10 MILLIGRAM(S): at 10:31

## 2021-01-01 RX ADMIN — ONDANSETRON 4 MILLIGRAM(S): 8 TABLET, FILM COATED ORAL at 14:50

## 2021-01-01 RX ADMIN — PANTOPRAZOLE SODIUM 40 MILLIGRAM(S): 20 TABLET, DELAYED RELEASE ORAL at 17:36

## 2021-01-01 RX ADMIN — CHLORHEXIDINE GLUCONATE 1 APPLICATION(S): 213 SOLUTION TOPICAL at 12:44

## 2021-01-01 RX ADMIN — Medication 300 MILLIGRAM(S): at 05:27

## 2021-01-01 RX ADMIN — Medication 40 MILLIGRAM(S): at 09:46

## 2021-01-01 RX ADMIN — HEPARIN SODIUM 5000 UNIT(S): 5000 INJECTION INTRAVENOUS; SUBCUTANEOUS at 21:33

## 2021-01-01 RX ADMIN — Medication 100 MILLIGRAM(S): at 22:42

## 2021-01-01 RX ADMIN — CEFEPIME 100 MILLIGRAM(S): 1 INJECTION, POWDER, FOR SOLUTION INTRAMUSCULAR; INTRAVENOUS at 06:22

## 2021-01-01 RX ADMIN — HEPARIN SODIUM 5000 UNIT(S): 5000 INJECTION INTRAVENOUS; SUBCUTANEOUS at 05:55

## 2021-01-01 RX ADMIN — Medication 10 MILLIGRAM(S): at 05:30

## 2021-01-01 RX ADMIN — TRAMADOL HYDROCHLORIDE 25 MILLIGRAM(S): 50 TABLET ORAL at 13:20

## 2021-01-01 RX ADMIN — CEFEPIME 100 MILLIGRAM(S): 1 INJECTION, POWDER, FOR SOLUTION INTRAMUSCULAR; INTRAVENOUS at 05:06

## 2021-01-01 RX ADMIN — Medication 650 MILLIGRAM(S): at 05:40

## 2021-01-01 RX ADMIN — Medication 50 MILLIGRAM(S): at 15:43

## 2021-01-01 RX ADMIN — SODIUM CHLORIDE 3 MILLILITER(S): 9 INJECTION INTRAMUSCULAR; INTRAVENOUS; SUBCUTANEOUS at 13:22

## 2021-01-01 RX ADMIN — HUMAN INSULIN 4 UNIT(S): 100 INJECTION, SUSPENSION SUBCUTANEOUS at 18:25

## 2021-01-01 RX ADMIN — SODIUM CHLORIDE 3 MILLILITER(S): 9 INJECTION INTRAMUSCULAR; INTRAVENOUS; SUBCUTANEOUS at 05:18

## 2021-01-01 RX ADMIN — SIMETHICONE 80 MILLIGRAM(S): 80 TABLET, CHEWABLE ORAL at 21:12

## 2021-01-01 RX ADMIN — Medication 10 MILLIGRAM(S): at 22:48

## 2021-01-01 RX ADMIN — Medication 5 MILLIGRAM(S): at 15:27

## 2021-01-01 RX ADMIN — CHLORHEXIDINE GLUCONATE 15 MILLILITER(S): 213 SOLUTION TOPICAL at 06:27

## 2021-01-01 RX ADMIN — Medication 10 MILLIGRAM(S): at 21:20

## 2021-01-01 RX ADMIN — PIPERACILLIN AND TAZOBACTAM 25 GRAM(S): 4; .5 INJECTION, POWDER, LYOPHILIZED, FOR SOLUTION INTRAVENOUS at 10:22

## 2021-01-01 RX ADMIN — Medication 25 MILLIGRAM(S): at 13:35

## 2021-01-01 RX ADMIN — Medication 1 MILLIGRAM(S): at 17:10

## 2021-01-01 RX ADMIN — Medication 50 GRAM(S): at 12:24

## 2021-01-01 RX ADMIN — Medication 1 TABLET(S): at 12:30

## 2021-01-01 RX ADMIN — Medication 10 MILLIGRAM(S): at 13:24

## 2021-01-01 RX ADMIN — SERTRALINE 100 MILLIGRAM(S): 25 TABLET, FILM COATED ORAL at 12:06

## 2021-01-01 RX ADMIN — Medication 100 MILLIGRAM(S): at 12:25

## 2021-01-01 RX ADMIN — Medication 100 MILLIGRAM(S): at 22:30

## 2021-01-01 RX ADMIN — Medication 50 MILLIGRAM(S): at 05:43

## 2021-01-01 RX ADMIN — Medication 25 MILLIGRAM(S): at 05:32

## 2021-01-01 RX ADMIN — Medication 50 MILLIGRAM(S): at 06:37

## 2021-01-01 RX ADMIN — CHLORHEXIDINE GLUCONATE 1 APPLICATION(S): 213 SOLUTION TOPICAL at 11:25

## 2021-01-01 RX ADMIN — Medication 650 MILLIGRAM(S): at 05:10

## 2021-01-01 RX ADMIN — HEPARIN SODIUM 5000 UNIT(S): 5000 INJECTION INTRAVENOUS; SUBCUTANEOUS at 05:49

## 2021-01-01 RX ADMIN — Medication 100 MILLIGRAM(S): at 13:13

## 2021-01-01 RX ADMIN — Medication 500 MILLIGRAM(S): at 05:49

## 2021-01-01 RX ADMIN — Medication 1 TABLET(S): at 11:59

## 2021-01-01 RX ADMIN — Medication 10 MILLIGRAM(S): at 22:42

## 2021-01-01 RX ADMIN — Medication 10 MILLIGRAM(S): at 23:09

## 2021-01-01 RX ADMIN — CHLORHEXIDINE GLUCONATE 15 MILLILITER(S): 213 SOLUTION TOPICAL at 17:32

## 2021-01-01 RX ADMIN — Medication 2: at 06:07

## 2021-01-01 RX ADMIN — Medication 125 MILLIGRAM(S): at 17:17

## 2021-01-01 RX ADMIN — CHLORHEXIDINE GLUCONATE 1 APPLICATION(S): 213 SOLUTION TOPICAL at 07:22

## 2021-01-01 RX ADMIN — Medication 5 MILLIGRAM(S): at 22:02

## 2021-01-01 RX ADMIN — MIRTAZAPINE 7.5 MILLIGRAM(S): 45 TABLET, ORALLY DISINTEGRATING ORAL at 12:05

## 2021-01-01 RX ADMIN — Medication 500 MILLIGRAM(S): at 17:26

## 2021-01-01 RX ADMIN — DEXMEDETOMIDINE HYDROCHLORIDE IN 0.9% SODIUM CHLORIDE 9.81 MICROGRAM(S)/KG/HR: 4 INJECTION INTRAVENOUS at 17:17

## 2021-01-01 RX ADMIN — ONDANSETRON 8 MILLIGRAM(S): 8 TABLET, FILM COATED ORAL at 13:49

## 2021-01-01 RX ADMIN — Medication 83.33 MILLIMOLE(S): at 05:15

## 2021-01-01 RX ADMIN — Medication 50 MILLIEQUIVALENT(S): at 01:31

## 2021-01-01 RX ADMIN — ONDANSETRON 4 MILLIGRAM(S): 8 TABLET, FILM COATED ORAL at 22:10

## 2021-01-01 RX ADMIN — PANTOPRAZOLE SODIUM 40 MILLIGRAM(S): 20 TABLET, DELAYED RELEASE ORAL at 17:50

## 2021-01-01 RX ADMIN — Medication 50 GRAM(S): at 03:47

## 2021-01-01 RX ADMIN — Medication 81 MILLIGRAM(S): at 12:18

## 2021-01-01 RX ADMIN — Medication 500 MILLIGRAM(S): at 23:28

## 2021-01-01 RX ADMIN — Medication 125 MILLIGRAM(S): at 06:27

## 2021-01-01 RX ADMIN — SODIUM CHLORIDE 30 MILLILITER(S): 9 INJECTION, SOLUTION INTRAVENOUS at 08:00

## 2021-01-01 RX ADMIN — ONDANSETRON 4 MILLIGRAM(S): 8 TABLET, FILM COATED ORAL at 10:30

## 2021-01-01 RX ADMIN — ENOXAPARIN SODIUM 60 MILLIGRAM(S): 100 INJECTION SUBCUTANEOUS at 21:55

## 2021-01-01 RX ADMIN — Medication 500 MILLIGRAM(S): at 18:07

## 2021-01-01 RX ADMIN — SODIUM CHLORIDE 3 MILLILITER(S): 9 INJECTION INTRAMUSCULAR; INTRAVENOUS; SUBCUTANEOUS at 13:32

## 2021-01-01 RX ADMIN — Medication 10 MILLIGRAM(S): at 11:51

## 2021-01-01 RX ADMIN — Medication 20 MILLIGRAM(S): at 21:49

## 2021-01-01 RX ADMIN — Medication 10 MILLIGRAM(S): at 06:44

## 2021-01-01 RX ADMIN — SODIUM CHLORIDE 3 MILLILITER(S): 9 INJECTION INTRAMUSCULAR; INTRAVENOUS; SUBCUTANEOUS at 13:07

## 2021-01-01 RX ADMIN — SERTRALINE 100 MILLIGRAM(S): 25 TABLET, FILM COATED ORAL at 11:01

## 2021-01-01 RX ADMIN — Medication 125 MILLIGRAM(S): at 01:03

## 2021-01-01 RX ADMIN — HUMAN INSULIN 4 UNIT(S): 100 INJECTION, SUSPENSION SUBCUTANEOUS at 06:10

## 2021-01-01 RX ADMIN — Medication 500 MILLIGRAM(S): at 00:13

## 2021-01-01 RX ADMIN — FENTANYL CITRATE 25 MICROGRAM(S): 50 INJECTION INTRAVENOUS at 17:45

## 2021-01-01 RX ADMIN — Medication 2: at 00:45

## 2021-01-01 RX ADMIN — Medication 10 MILLIGRAM(S): at 12:49

## 2021-01-01 RX ADMIN — SODIUM CHLORIDE 75 MILLILITER(S): 9 INJECTION, SOLUTION INTRAVENOUS at 08:27

## 2021-01-01 RX ADMIN — EPINEPHRINE 14.7 MICROGRAM(S)/KG/MIN: 0.3 INJECTION INTRAMUSCULAR; SUBCUTANEOUS at 21:47

## 2021-01-01 RX ADMIN — Medication 50 MILLIEQUIVALENT(S): at 15:20

## 2021-01-01 RX ADMIN — MIRTAZAPINE 7.5 MILLIGRAM(S): 45 TABLET, ORALLY DISINTEGRATING ORAL at 12:47

## 2021-01-01 RX ADMIN — Medication 0.5 MILLIGRAM(S): at 13:42

## 2021-01-01 RX ADMIN — HEPARIN SODIUM 5000 UNIT(S): 5000 INJECTION INTRAVENOUS; SUBCUTANEOUS at 05:10

## 2021-01-01 RX ADMIN — Medication 650 MILLIGRAM(S): at 10:15

## 2021-01-01 RX ADMIN — Medication 125 MILLILITER(S): at 20:00

## 2021-01-01 RX ADMIN — HEPARIN SODIUM 5000 UNIT(S): 5000 INJECTION INTRAVENOUS; SUBCUTANEOUS at 06:41

## 2021-01-01 RX ADMIN — Medication 2: at 12:42

## 2021-01-01 RX ADMIN — Medication 20 MILLIEQUIVALENT(S): at 06:33

## 2021-01-01 RX ADMIN — HEPARIN SODIUM 5000 UNIT(S): 5000 INJECTION INTRAVENOUS; SUBCUTANEOUS at 21:56

## 2021-01-01 RX ADMIN — FENTANYL CITRATE 1.96 MICROGRAM(S)/KG/HR: 50 INJECTION INTRAVENOUS at 23:55

## 2021-01-01 RX ADMIN — Medication 0.5 MILLIGRAM(S): at 21:40

## 2021-01-01 RX ADMIN — Medication 2: at 05:22

## 2021-01-01 RX ADMIN — Medication 20 MILLIGRAM(S): at 16:12

## 2021-01-01 RX ADMIN — Medication 125 MILLIGRAM(S): at 05:18

## 2021-01-01 RX ADMIN — Medication 50 MILLIEQUIVALENT(S): at 01:00

## 2021-01-01 RX ADMIN — MEROPENEM 100 MILLIGRAM(S): 1 INJECTION INTRAVENOUS at 21:04

## 2021-01-01 RX ADMIN — Medication 318.75 MILLIGRAM(S): at 12:25

## 2021-01-01 RX ADMIN — SPIRONOLACTONE 12.5 MILLIGRAM(S): 25 TABLET, FILM COATED ORAL at 05:14

## 2021-01-01 RX ADMIN — Medication 20 MILLIGRAM(S): at 13:56

## 2021-01-01 RX ADMIN — MAGNESIUM OXIDE 400 MG ORAL TABLET 400 MILLIGRAM(S): 241.3 TABLET ORAL at 06:09

## 2021-01-01 RX ADMIN — HUMAN INSULIN 10 UNIT(S): 100 INJECTION, SUSPENSION SUBCUTANEOUS at 11:30

## 2021-01-01 RX ADMIN — Medication 0: at 05:23

## 2021-01-01 RX ADMIN — Medication 50 MILLIEQUIVALENT(S): at 01:44

## 2021-01-01 RX ADMIN — Medication 50 MILLIEQUIVALENT(S): at 13:59

## 2021-01-01 RX ADMIN — Medication 25 MILLIGRAM(S): at 18:59

## 2021-01-01 RX ADMIN — HUMAN INSULIN 10 UNIT(S): 100 INJECTION, SUSPENSION SUBCUTANEOUS at 06:15

## 2021-01-01 RX ADMIN — SODIUM CHLORIDE 3 MILLILITER(S): 9 INJECTION INTRAMUSCULAR; INTRAVENOUS; SUBCUTANEOUS at 05:22

## 2021-01-01 RX ADMIN — Medication 125 MILLIGRAM(S): at 17:42

## 2021-01-01 RX ADMIN — SERTRALINE 100 MILLIGRAM(S): 25 TABLET, FILM COATED ORAL at 12:28

## 2021-01-01 RX ADMIN — Medication 12.5 MILLIGRAM(S): at 13:09

## 2021-01-01 RX ADMIN — Medication 50 MILLIEQUIVALENT(S): at 00:52

## 2021-01-01 RX ADMIN — CEFEPIME 100 MILLIGRAM(S): 1 INJECTION, POWDER, FOR SOLUTION INTRAMUSCULAR; INTRAVENOUS at 21:51

## 2021-01-01 RX ADMIN — Medication 10 MILLIGRAM(S): at 15:29

## 2021-01-01 RX ADMIN — ENOXAPARIN SODIUM 30 MILLIGRAM(S): 100 INJECTION SUBCUTANEOUS at 12:14

## 2021-01-01 RX ADMIN — SODIUM CHLORIDE 3 MILLILITER(S): 9 INJECTION INTRAMUSCULAR; INTRAVENOUS; SUBCUTANEOUS at 11:54

## 2021-01-01 RX ADMIN — Medication 125 MILLILITER(S): at 21:15

## 2021-01-01 RX ADMIN — Medication 25 MILLIGRAM(S): at 05:30

## 2021-01-01 RX ADMIN — Medication 5 MILLIGRAM(S): at 14:49

## 2021-01-01 RX ADMIN — PIPERACILLIN AND TAZOBACTAM 25 GRAM(S): 4; .5 INJECTION, POWDER, LYOPHILIZED, FOR SOLUTION INTRAVENOUS at 00:20

## 2021-01-01 RX ADMIN — SPIRONOLACTONE 25 MILLIGRAM(S): 25 TABLET, FILM COATED ORAL at 05:54

## 2021-01-01 RX ADMIN — Medication 500 MILLIGRAM(S): at 17:09

## 2021-01-01 RX ADMIN — Medication 3 MILLILITER(S): at 23:24

## 2021-01-01 RX ADMIN — LOSARTAN POTASSIUM 25 MILLIGRAM(S): 100 TABLET, FILM COATED ORAL at 09:20

## 2021-01-01 RX ADMIN — Medication 81 MILLIGRAM(S): at 12:15

## 2021-01-01 RX ADMIN — HEPARIN SODIUM 5000 UNIT(S): 5000 INJECTION INTRAVENOUS; SUBCUTANEOUS at 21:08

## 2021-01-01 RX ADMIN — Medication 100 MILLIGRAM(S): at 11:52

## 2021-01-01 RX ADMIN — Medication 318.75 MILLIGRAM(S): at 14:13

## 2021-01-01 RX ADMIN — MEROPENEM 100 MILLIGRAM(S): 1 INJECTION INTRAVENOUS at 05:32

## 2021-01-01 RX ADMIN — MEROPENEM 100 MILLIGRAM(S): 1 INJECTION INTRAVENOUS at 21:50

## 2021-01-01 RX ADMIN — PANTOPRAZOLE SODIUM 10 MG/HR: 20 TABLET, DELAYED RELEASE ORAL at 20:09

## 2021-01-01 RX ADMIN — Medication 0.5 MILLIGRAM(S): at 21:09

## 2021-01-01 RX ADMIN — Medication 125 MILLIGRAM(S): at 17:48

## 2021-01-01 RX ADMIN — ONDANSETRON 8 MILLIGRAM(S): 8 TABLET, FILM COATED ORAL at 06:09

## 2021-01-01 RX ADMIN — CHLORHEXIDINE GLUCONATE 1 APPLICATION(S): 213 SOLUTION TOPICAL at 14:33

## 2021-01-01 RX ADMIN — Medication 1 TABLET(S): at 13:16

## 2021-01-01 RX ADMIN — CHLORHEXIDINE GLUCONATE 15 MILLILITER(S): 213 SOLUTION TOPICAL at 18:07

## 2021-01-01 RX ADMIN — Medication 6 MILLIGRAM(S): at 06:22

## 2021-01-01 RX ADMIN — Medication 2: at 11:46

## 2021-01-01 RX ADMIN — MAGNESIUM OXIDE 400 MG ORAL TABLET 400 MILLIGRAM(S): 241.3 TABLET ORAL at 05:31

## 2021-01-01 RX ADMIN — Medication 400 MILLIGRAM(S): at 22:25

## 2021-01-01 RX ADMIN — LIDOCAINE 1 PATCH: 4 CREAM TOPICAL at 20:11

## 2021-01-01 RX ADMIN — SODIUM CHLORIDE 3 MILLILITER(S): 9 INJECTION INTRAMUSCULAR; INTRAVENOUS; SUBCUTANEOUS at 21:34

## 2021-01-01 RX ADMIN — VECURONIUM BROMIDE 5 MILLIGRAM(S): 20 INJECTION, POWDER, FOR SOLUTION INTRAVENOUS at 00:00

## 2021-01-01 RX ADMIN — Medication 650 MILLIGRAM(S): at 20:00

## 2021-01-01 RX ADMIN — Medication 100 MILLIGRAM(S): at 21:31

## 2021-01-01 RX ADMIN — PANTOPRAZOLE SODIUM 40 MILLIGRAM(S): 20 TABLET, DELAYED RELEASE ORAL at 11:04

## 2021-01-01 RX ADMIN — Medication 12.5 MILLIGRAM(S): at 15:31

## 2021-01-01 RX ADMIN — Medication 0.5 MILLIGRAM(S): at 01:26

## 2021-01-01 RX ADMIN — Medication 100 MILLIGRAM(S): at 06:10

## 2021-01-01 RX ADMIN — CHLORHEXIDINE GLUCONATE 1 APPLICATION(S): 213 SOLUTION TOPICAL at 12:05

## 2021-01-01 RX ADMIN — Medication 20 MILLIGRAM(S): at 22:44

## 2021-01-01 RX ADMIN — EPINEPHRINE 5.89 MICROGRAM(S)/KG/MIN: 0.3 INJECTION INTRAMUSCULAR; SUBCUTANEOUS at 20:51

## 2021-01-01 RX ADMIN — Medication 0.5 MILLIGRAM(S): at 21:30

## 2021-01-01 RX ADMIN — PANTOPRAZOLE SODIUM 40 MILLIGRAM(S): 20 TABLET, DELAYED RELEASE ORAL at 06:12

## 2021-01-01 RX ADMIN — Medication 2: at 11:42

## 2021-01-01 RX ADMIN — CHLORHEXIDINE GLUCONATE 1 APPLICATION(S): 213 SOLUTION TOPICAL at 05:35

## 2021-01-01 RX ADMIN — Medication 10 MILLIGRAM(S): at 21:07

## 2021-01-01 RX ADMIN — Medication 650 MILLIGRAM(S): at 23:20

## 2021-01-01 RX ADMIN — Medication 2: at 17:28

## 2021-01-01 RX ADMIN — CEFEPIME 100 MILLIGRAM(S): 1 INJECTION, POWDER, FOR SOLUTION INTRAMUSCULAR; INTRAVENOUS at 05:15

## 2021-01-01 RX ADMIN — Medication 500 MILLIGRAM(S): at 01:58

## 2021-01-01 RX ADMIN — Medication 50 MILLIEQUIVALENT(S): at 05:21

## 2021-01-01 RX ADMIN — MEROPENEM 100 MILLIGRAM(S): 1 INJECTION INTRAVENOUS at 21:28

## 2021-01-01 RX ADMIN — HYDROMORPHONE HYDROCHLORIDE 0.5 MILLIGRAM(S): 2 INJECTION INTRAMUSCULAR; INTRAVENOUS; SUBCUTANEOUS at 03:30

## 2021-01-01 RX ADMIN — VASOPRESSIN 2 UNIT(S)/MIN: 20 INJECTION INTRAVENOUS at 23:50

## 2021-01-01 RX ADMIN — HEPARIN SODIUM 5000 UNIT(S): 5000 INJECTION INTRAVENOUS; SUBCUTANEOUS at 14:07

## 2021-01-01 RX ADMIN — Medication 0.5 MILLIGRAM(S): at 23:27

## 2021-01-01 RX ADMIN — Medication 5 MILLIGRAM(S): at 21:33

## 2021-01-01 RX ADMIN — FENTANYL CITRATE 25 MICROGRAM(S): 50 INJECTION INTRAVENOUS at 12:00

## 2021-01-01 RX ADMIN — Medication 10 MILLIGRAM(S): at 21:08

## 2021-01-01 RX ADMIN — Medication 2: at 13:09

## 2021-01-01 RX ADMIN — Medication 1 TABLET(S): at 17:45

## 2021-01-01 RX ADMIN — Medication 125 MILLIGRAM(S): at 17:04

## 2021-01-01 RX ADMIN — Medication 2: at 22:52

## 2021-01-01 RX ADMIN — Medication 250 MILLIGRAM(S): at 05:26

## 2021-01-01 RX ADMIN — WARFARIN SODIUM 5 MILLIGRAM(S): 2.5 TABLET ORAL at 22:00

## 2021-01-01 RX ADMIN — OCTREOTIDE ACETATE 50 MICROGRAM(S): 200 INJECTION, SOLUTION INTRAVENOUS; SUBCUTANEOUS at 21:12

## 2021-01-01 RX ADMIN — PANTOPRAZOLE SODIUM 40 MILLIGRAM(S): 20 TABLET, DELAYED RELEASE ORAL at 13:09

## 2021-01-01 RX ADMIN — PANTOPRAZOLE SODIUM 40 MILLIGRAM(S): 20 TABLET, DELAYED RELEASE ORAL at 18:35

## 2021-01-01 RX ADMIN — ONDANSETRON 8 MILLIGRAM(S): 8 TABLET, FILM COATED ORAL at 22:12

## 2021-01-01 RX ADMIN — OXYCODONE HYDROCHLORIDE 5 MILLIGRAM(S): 5 TABLET ORAL at 10:46

## 2021-01-01 RX ADMIN — Medication 100 MILLIGRAM(S): at 05:36

## 2021-01-01 RX ADMIN — HUMAN INSULIN 6 UNIT(S): 100 INJECTION, SUSPENSION SUBCUTANEOUS at 13:01

## 2021-01-01 RX ADMIN — DEXTROSE MONOHYDRATE, SODIUM CHLORIDE, AND POTASSIUM CHLORIDE 30 MILLILITER(S): 50; .745; 4.5 INJECTION, SOLUTION INTRAVENOUS at 04:51

## 2021-01-01 RX ADMIN — Medication 1: at 05:04

## 2021-01-01 RX ADMIN — CEFEPIME 100 MILLIGRAM(S): 1 INJECTION, POWDER, FOR SOLUTION INTRAMUSCULAR; INTRAVENOUS at 13:49

## 2021-01-01 RX ADMIN — Medication 500 MILLIGRAM(S): at 17:34

## 2021-01-01 RX ADMIN — Medication 100 MILLIGRAM(S): at 06:41

## 2021-01-01 RX ADMIN — ENOXAPARIN SODIUM 30 MILLIGRAM(S): 100 INJECTION SUBCUTANEOUS at 12:54

## 2021-01-01 RX ADMIN — MIRTAZAPINE 7.5 MILLIGRAM(S): 45 TABLET, ORALLY DISINTEGRATING ORAL at 11:00

## 2021-01-01 RX ADMIN — FENTANYL CITRATE 25 MICROGRAM(S): 50 INJECTION INTRAVENOUS at 20:30

## 2021-01-01 RX ADMIN — CHLORHEXIDINE GLUCONATE 15 MILLILITER(S): 213 SOLUTION TOPICAL at 17:10

## 2021-01-01 RX ADMIN — Medication 125 MILLIGRAM(S): at 22:58

## 2021-01-01 RX ADMIN — Medication 50 GRAM(S): at 05:06

## 2021-01-01 RX ADMIN — Medication 100 MILLIGRAM(S): at 18:15

## 2021-01-01 RX ADMIN — SERTRALINE 50 MILLIGRAM(S): 25 TABLET, FILM COATED ORAL at 13:01

## 2021-01-01 RX ADMIN — Medication 1 TABLET(S): at 13:10

## 2021-01-01 RX ADMIN — Medication 1 TABLET(S): at 12:21

## 2021-01-01 RX ADMIN — Medication 15 GRAM(S): at 12:48

## 2021-01-01 RX ADMIN — SCOPALAMINE 1 PATCH: 1 PATCH, EXTENDED RELEASE TRANSDERMAL at 18:46

## 2021-01-01 RX ADMIN — MEROPENEM 100 MILLIGRAM(S): 1 INJECTION INTRAVENOUS at 05:07

## 2021-01-01 RX ADMIN — Medication 125 MILLIGRAM(S): at 18:23

## 2021-01-01 RX ADMIN — Medication 5 MILLIGRAM(S): at 13:10

## 2021-01-01 RX ADMIN — HEPARIN SODIUM 5000 UNIT(S): 5000 INJECTION INTRAVENOUS; SUBCUTANEOUS at 05:36

## 2021-01-01 RX ADMIN — HUMAN INSULIN 8 UNIT(S): 100 INJECTION, SUSPENSION SUBCUTANEOUS at 05:11

## 2021-01-01 RX ADMIN — Medication 125 MILLIGRAM(S): at 06:33

## 2021-01-01 RX ADMIN — Medication 62.5 MILLIMOLE(S): at 04:22

## 2021-01-01 RX ADMIN — Medication 40 MILLIEQUIVALENT(S): at 01:59

## 2021-01-01 RX ADMIN — Medication 125 MILLILITER(S): at 17:30

## 2021-01-01 RX ADMIN — Medication 1: at 12:33

## 2021-01-01 RX ADMIN — Medication 10 MILLIGRAM(S): at 12:28

## 2021-01-01 RX ADMIN — ONDANSETRON 8 MILLIGRAM(S): 8 TABLET, FILM COATED ORAL at 22:23

## 2021-01-01 RX ADMIN — Medication 1: at 12:46

## 2021-01-01 RX ADMIN — ENOXAPARIN SODIUM 30 MILLIGRAM(S): 100 INJECTION SUBCUTANEOUS at 12:27

## 2021-01-01 RX ADMIN — SCOPALAMINE 1 PATCH: 1 PATCH, EXTENDED RELEASE TRANSDERMAL at 07:32

## 2021-01-01 RX ADMIN — FENTANYL CITRATE 12 MICROGRAM(S): 50 INJECTION INTRAVENOUS at 02:45

## 2021-01-01 RX ADMIN — SIMETHICONE 80 MILLIGRAM(S): 80 TABLET, CHEWABLE ORAL at 10:29

## 2021-01-01 RX ADMIN — CEFEPIME 100 MILLIGRAM(S): 1 INJECTION, POWDER, FOR SOLUTION INTRAMUSCULAR; INTRAVENOUS at 05:10

## 2021-01-01 RX ADMIN — Medication 500 MILLIGRAM(S): at 23:10

## 2021-01-01 RX ADMIN — MAGNESIUM OXIDE 400 MG ORAL TABLET 400 MILLIGRAM(S): 241.3 TABLET ORAL at 17:00

## 2021-01-01 RX ADMIN — Medication 125 MILLIGRAM(S): at 21:21

## 2021-01-01 RX ADMIN — Medication 500 MILLIGRAM(S): at 17:14

## 2021-01-01 RX ADMIN — CEFEPIME 100 MILLIGRAM(S): 1 INJECTION, POWDER, FOR SOLUTION INTRAMUSCULAR; INTRAVENOUS at 05:12

## 2021-01-01 RX ADMIN — Medication 250 MILLIGRAM(S): at 05:54

## 2021-01-01 RX ADMIN — HUMAN INSULIN 6 UNIT(S): 100 INJECTION, SUSPENSION SUBCUTANEOUS at 12:06

## 2021-01-01 RX ADMIN — SODIUM CHLORIDE 3 MILLILITER(S): 9 INJECTION INTRAMUSCULAR; INTRAVENOUS; SUBCUTANEOUS at 22:34

## 2021-01-01 RX ADMIN — Medication 50 MILLIGRAM(S): at 06:08

## 2021-01-01 RX ADMIN — CEFEPIME 100 MILLIGRAM(S): 1 INJECTION, POWDER, FOR SOLUTION INTRAMUSCULAR; INTRAVENOUS at 01:00

## 2021-01-01 RX ADMIN — MIRTAZAPINE 7.5 MILLIGRAM(S): 45 TABLET, ORALLY DISINTEGRATING ORAL at 21:55

## 2021-01-01 RX ADMIN — Medication 5 MILLIGRAM(S): at 16:32

## 2021-01-01 RX ADMIN — Medication 12.5 MILLIGRAM(S): at 05:25

## 2021-01-01 RX ADMIN — Medication 12.5 MILLIGRAM(S): at 22:36

## 2021-01-01 RX ADMIN — Medication 1 TABLET(S): at 13:42

## 2021-01-01 RX ADMIN — Medication 6 MILLIGRAM(S): at 05:16

## 2021-01-01 RX ADMIN — HUMAN INSULIN 6 UNIT(S): 100 INJECTION, SUSPENSION SUBCUTANEOUS at 12:42

## 2021-01-01 RX ADMIN — OCTREOTIDE ACETATE 50 MICROGRAM(S): 200 INJECTION, SOLUTION INTRAVENOUS; SUBCUTANEOUS at 22:02

## 2021-01-01 RX ADMIN — Medication 0.5 MILLIGRAM(S): at 14:02

## 2021-01-01 RX ADMIN — SODIUM CHLORIDE 1 GRAM(S): 9 INJECTION INTRAMUSCULAR; INTRAVENOUS; SUBCUTANEOUS at 05:19

## 2021-01-01 RX ADMIN — SERTRALINE 100 MILLIGRAM(S): 25 TABLET, FILM COATED ORAL at 12:15

## 2021-01-01 RX ADMIN — SODIUM CHLORIDE 30 MILLILITER(S): 9 INJECTION INTRAMUSCULAR; INTRAVENOUS; SUBCUTANEOUS at 13:11

## 2021-01-01 RX ADMIN — ONDANSETRON 8 MILLIGRAM(S): 8 TABLET, FILM COATED ORAL at 13:35

## 2021-01-01 RX ADMIN — Medication 100 MILLIGRAM(S): at 14:25

## 2021-01-01 RX ADMIN — CEFEPIME 100 MILLIGRAM(S): 1 INJECTION, POWDER, FOR SOLUTION INTRAMUSCULAR; INTRAVENOUS at 22:10

## 2021-01-01 RX ADMIN — Medication 125 MILLIGRAM(S): at 05:16

## 2021-01-01 RX ADMIN — ENOXAPARIN SODIUM 60 MILLIGRAM(S): 100 INJECTION SUBCUTANEOUS at 10:49

## 2021-01-01 RX ADMIN — PIPERACILLIN AND TAZOBACTAM 25 GRAM(S): 4; .5 INJECTION, POWDER, LYOPHILIZED, FOR SOLUTION INTRAVENOUS at 08:55

## 2021-01-01 RX ADMIN — Medication 0.5 MILLIGRAM(S): at 21:02

## 2021-01-01 RX ADMIN — Medication 50 GRAM(S): at 03:30

## 2021-01-01 RX ADMIN — CHOLESTYRAMINE 4 GRAM(S): 4 POWDER, FOR SUSPENSION ORAL at 17:57

## 2021-01-01 RX ADMIN — CHLORHEXIDINE GLUCONATE 1 APPLICATION(S): 213 SOLUTION TOPICAL at 05:43

## 2021-01-01 RX ADMIN — Medication 40 MILLIEQUIVALENT(S): at 12:02

## 2021-01-01 RX ADMIN — ONDANSETRON 4 MILLIGRAM(S): 8 TABLET, FILM COATED ORAL at 09:00

## 2021-01-01 RX ADMIN — Medication 500 MILLIGRAM(S): at 06:45

## 2021-01-01 RX ADMIN — Medication 25 MILLIGRAM(S): at 13:51

## 2021-01-01 RX ADMIN — Medication 50 MILLIEQUIVALENT(S): at 05:39

## 2021-01-01 RX ADMIN — PANTOPRAZOLE SODIUM 40 MILLIGRAM(S): 20 TABLET, DELAYED RELEASE ORAL at 12:17

## 2021-01-01 RX ADMIN — Medication 10 MILLIGRAM(S): at 22:17

## 2021-01-01 RX ADMIN — Medication 0.5 MILLIGRAM(S): at 21:06

## 2021-01-01 RX ADMIN — Medication 1: at 05:22

## 2021-01-01 RX ADMIN — Medication 0.5 MILLIGRAM(S): at 22:19

## 2021-01-01 RX ADMIN — CHLORHEXIDINE GLUCONATE 1 APPLICATION(S): 213 SOLUTION TOPICAL at 06:33

## 2021-01-01 RX ADMIN — CHLORHEXIDINE GLUCONATE 1 APPLICATION(S): 213 SOLUTION TOPICAL at 07:10

## 2021-01-01 RX ADMIN — Medication 10 MILLIGRAM(S): at 21:33

## 2021-01-01 RX ADMIN — Medication 125 MILLIGRAM(S): at 05:36

## 2021-01-01 RX ADMIN — SODIUM CHLORIDE 45 MILLILITER(S): 9 INJECTION, SOLUTION INTRAVENOUS at 22:12

## 2021-01-01 RX ADMIN — HEPARIN SODIUM 5000 UNIT(S): 5000 INJECTION INTRAVENOUS; SUBCUTANEOUS at 06:12

## 2021-01-01 RX ADMIN — VASOPRESSIN 2.4 UNIT(S)/MIN: 20 INJECTION INTRAVENOUS at 22:13

## 2021-01-01 RX ADMIN — Medication 10 MILLIGRAM(S): at 23:27

## 2021-01-01 RX ADMIN — FENTANYL CITRATE 12 MICROGRAM(S): 50 INJECTION INTRAVENOUS at 20:32

## 2021-01-01 RX ADMIN — Medication 50 GRAM(S): at 02:49

## 2021-01-01 RX ADMIN — Medication 100 MILLIGRAM(S): at 23:27

## 2021-01-01 RX ADMIN — MAGNESIUM OXIDE 400 MG ORAL TABLET 400 MILLIGRAM(S): 241.3 TABLET ORAL at 06:15

## 2021-01-01 RX ADMIN — FENTANYL CITRATE 12.5 MICROGRAM(S): 50 INJECTION INTRAVENOUS at 22:10

## 2021-01-01 RX ADMIN — Medication 15 MILLIGRAM(S): at 12:37

## 2021-01-01 RX ADMIN — Medication 0.5 MILLIGRAM(S): at 21:44

## 2021-01-01 RX ADMIN — PANTOPRAZOLE SODIUM 10 MG/HR: 20 TABLET, DELAYED RELEASE ORAL at 21:23

## 2021-01-01 RX ADMIN — CEFEPIME 100 MILLIGRAM(S): 1 INJECTION, POWDER, FOR SOLUTION INTRAMUSCULAR; INTRAVENOUS at 20:35

## 2021-01-01 RX ADMIN — HEPARIN SODIUM 10 UNIT(S)/HR: 5000 INJECTION INTRAVENOUS; SUBCUTANEOUS at 05:05

## 2021-01-01 RX ADMIN — Medication 250 MILLIGRAM(S): at 22:18

## 2021-01-01 RX ADMIN — SERTRALINE 100 MILLIGRAM(S): 25 TABLET, FILM COATED ORAL at 13:56

## 2021-01-01 RX ADMIN — Medication 1 TABLET(S): at 11:19

## 2021-01-01 RX ADMIN — PIPERACILLIN AND TAZOBACTAM 25 GRAM(S): 4; .5 INJECTION, POWDER, LYOPHILIZED, FOR SOLUTION INTRAVENOUS at 02:20

## 2021-01-01 RX ADMIN — CEFEPIME 100 MILLIGRAM(S): 1 INJECTION, POWDER, FOR SOLUTION INTRAMUSCULAR; INTRAVENOUS at 05:05

## 2021-01-01 RX ADMIN — Medication 50 MILLIGRAM(S): at 13:14

## 2021-01-01 RX ADMIN — PANTOPRAZOLE SODIUM 40 MILLIGRAM(S): 20 TABLET, DELAYED RELEASE ORAL at 11:16

## 2021-01-01 RX ADMIN — Medication 10 MILLIGRAM(S): at 13:14

## 2021-01-01 RX ADMIN — SERTRALINE 100 MILLIGRAM(S): 25 TABLET, FILM COATED ORAL at 11:56

## 2021-01-01 RX ADMIN — SERTRALINE 100 MILLIGRAM(S): 25 TABLET, FILM COATED ORAL at 21:32

## 2021-01-01 RX ADMIN — Medication 1 TABLET(S): at 12:22

## 2021-01-01 RX ADMIN — Medication 1 TABLET(S): at 17:25

## 2021-01-01 RX ADMIN — SENNA PLUS 2 TABLET(S): 8.6 TABLET ORAL at 21:40

## 2021-01-01 RX ADMIN — OXYCODONE HYDROCHLORIDE 5 MILLIGRAM(S): 5 TABLET ORAL at 11:00

## 2021-01-01 RX ADMIN — Medication 100 MILLIGRAM(S): at 12:56

## 2021-01-01 RX ADMIN — Medication 40 MILLIEQUIVALENT(S): at 01:08

## 2021-01-01 RX ADMIN — Medication 125 MILLIGRAM(S): at 14:15

## 2021-01-01 RX ADMIN — Medication 10 MILLIGRAM(S): at 06:19

## 2021-01-01 RX ADMIN — Medication 1000 MILLIGRAM(S): at 07:00

## 2021-01-01 RX ADMIN — Medication 125 MILLIGRAM(S): at 08:21

## 2021-01-01 RX ADMIN — DEXMEDETOMIDINE HYDROCHLORIDE IN 0.9% SODIUM CHLORIDE 23.1 MICROGRAM(S)/KG/HR: 4 INJECTION INTRAVENOUS at 05:11

## 2021-01-01 RX ADMIN — PROPOFOL 9.42 MICROGRAM(S)/KG/MIN: 10 INJECTION, EMULSION INTRAVENOUS at 05:40

## 2021-01-01 RX ADMIN — HEPARIN SODIUM 5000 UNIT(S): 5000 INJECTION INTRAVENOUS; SUBCUTANEOUS at 06:08

## 2021-01-01 RX ADMIN — CEFEPIME 100 MILLIGRAM(S): 1 INJECTION, POWDER, FOR SOLUTION INTRAMUSCULAR; INTRAVENOUS at 13:34

## 2021-01-01 RX ADMIN — SPIRONOLACTONE 25 MILLIGRAM(S): 25 TABLET, FILM COATED ORAL at 08:31

## 2021-01-01 RX ADMIN — Medication 318.75 MILLIGRAM(S): at 12:29

## 2021-01-01 RX ADMIN — Medication 1: at 12:10

## 2021-01-01 RX ADMIN — Medication 125 MILLIGRAM(S): at 22:12

## 2021-01-01 RX ADMIN — SIMETHICONE 80 MILLIGRAM(S): 80 TABLET, CHEWABLE ORAL at 21:25

## 2021-01-01 RX ADMIN — Medication 1 TABLET(S): at 15:21

## 2021-01-01 RX ADMIN — Medication 2: at 18:33

## 2021-01-01 RX ADMIN — HUMAN INSULIN 6 UNIT(S): 100 INJECTION, SUSPENSION SUBCUTANEOUS at 05:30

## 2021-01-01 RX ADMIN — Medication 0.5 MILLIGRAM(S): at 22:01

## 2021-01-01 RX ADMIN — SODIUM CHLORIDE 50 MILLILITER(S): 9 INJECTION, SOLUTION INTRAVENOUS at 05:54

## 2021-01-01 RX ADMIN — Medication 100 MILLIGRAM(S): at 07:49

## 2021-01-01 RX ADMIN — SODIUM CHLORIDE 3 MILLILITER(S): 9 INJECTION INTRAMUSCULAR; INTRAVENOUS; SUBCUTANEOUS at 20:34

## 2021-01-01 RX ADMIN — MIRTAZAPINE 7.5 MILLIGRAM(S): 45 TABLET, ORALLY DISINTEGRATING ORAL at 12:48

## 2021-01-01 RX ADMIN — PANTOPRAZOLE SODIUM 40 MILLIGRAM(S): 20 TABLET, DELAYED RELEASE ORAL at 11:05

## 2021-01-01 RX ADMIN — HUMAN INSULIN 6 UNIT(S): 100 INJECTION, SUSPENSION SUBCUTANEOUS at 11:39

## 2021-01-01 RX ADMIN — Medication 125 MILLIGRAM(S): at 09:24

## 2021-01-01 RX ADMIN — DEXMEDETOMIDINE HYDROCHLORIDE IN 0.9% SODIUM CHLORIDE 7.85 MICROGRAM(S)/KG/HR: 4 INJECTION INTRAVENOUS at 21:47

## 2021-01-01 RX ADMIN — Medication 650 MILLIGRAM(S): at 10:29

## 2021-01-01 RX ADMIN — Medication 300 MILLIGRAM(S): at 17:03

## 2021-01-01 RX ADMIN — SODIUM CHLORIDE 45 MILLILITER(S): 9 INJECTION, SOLUTION INTRAVENOUS at 08:16

## 2021-01-01 RX ADMIN — Medication 125 MILLIGRAM(S): at 18:32

## 2021-01-01 RX ADMIN — REMDESIVIR 500 MILLIGRAM(S): 5 INJECTION INTRAVENOUS at 18:15

## 2021-01-01 RX ADMIN — CLOPIDOGREL BISULFATE 75 MILLIGRAM(S): 75 TABLET, FILM COATED ORAL at 12:50

## 2021-01-01 RX ADMIN — Medication 81 MILLIGRAM(S): at 13:20

## 2021-01-01 RX ADMIN — HEPARIN SODIUM 11.5 UNIT(S)/HR: 5000 INJECTION INTRAVENOUS; SUBCUTANEOUS at 00:32

## 2021-01-01 RX ADMIN — Medication 25 MILLIGRAM(S): at 05:54

## 2021-01-01 RX ADMIN — Medication 125 MILLIGRAM(S): at 01:20

## 2021-01-01 RX ADMIN — Medication 50 MILLIEQUIVALENT(S): at 21:18

## 2021-01-01 RX ADMIN — SPIRONOLACTONE 12.5 MILLIGRAM(S): 25 TABLET, FILM COATED ORAL at 17:17

## 2021-01-01 RX ADMIN — Medication 0.5 MILLIGRAM(S): at 22:17

## 2021-01-01 RX ADMIN — HEPARIN SODIUM 5000 UNIT(S): 5000 INJECTION INTRAVENOUS; SUBCUTANEOUS at 13:16

## 2021-01-01 RX ADMIN — SIMETHICONE 80 MILLIGRAM(S): 80 TABLET, CHEWABLE ORAL at 16:03

## 2021-01-01 RX ADMIN — Medication 100 MILLIGRAM(S): at 11:24

## 2021-01-01 RX ADMIN — Medication 10 MILLIGRAM(S): at 22:15

## 2021-01-01 RX ADMIN — Medication 250 MILLIGRAM(S): at 05:43

## 2021-01-01 RX ADMIN — CEFEPIME 100 MILLIGRAM(S): 1 INJECTION, POWDER, FOR SOLUTION INTRAMUSCULAR; INTRAVENOUS at 13:18

## 2021-01-01 RX ADMIN — MEROPENEM 100 MILLIGRAM(S): 1 INJECTION INTRAVENOUS at 13:24

## 2021-01-01 RX ADMIN — HEPARIN SODIUM 5000 UNIT(S): 5000 INJECTION INTRAVENOUS; SUBCUTANEOUS at 21:32

## 2021-01-01 RX ADMIN — Medication 50 MILLILITER(S): at 03:03

## 2021-01-01 RX ADMIN — Medication 1: at 13:13

## 2021-01-01 RX ADMIN — MIRTAZAPINE 7.5 MILLIGRAM(S): 45 TABLET, ORALLY DISINTEGRATING ORAL at 21:00

## 2021-01-01 RX ADMIN — Medication 50 MILLIEQUIVALENT(S): at 00:31

## 2021-01-01 RX ADMIN — Medication 125 MILLIGRAM(S): at 19:16

## 2021-01-01 RX ADMIN — Medication 1000 MILLIGRAM(S): at 02:23

## 2021-01-01 RX ADMIN — Medication 5 MILLIGRAM(S): at 20:07

## 2021-01-01 RX ADMIN — Medication 50 MILLIEQUIVALENT(S): at 14:09

## 2021-01-01 RX ADMIN — Medication 50 MILLIEQUIVALENT(S): at 04:00

## 2021-01-01 RX ADMIN — Medication 125 MILLIGRAM(S): at 22:04

## 2021-01-01 RX ADMIN — SERTRALINE 100 MILLIGRAM(S): 25 TABLET, FILM COATED ORAL at 11:39

## 2021-01-01 RX ADMIN — Medication 500 MILLIGRAM(S): at 12:11

## 2021-01-01 RX ADMIN — CHLORHEXIDINE GLUCONATE 15 MILLILITER(S): 213 SOLUTION TOPICAL at 16:24

## 2021-01-01 RX ADMIN — Medication 125 MILLILITER(S): at 17:00

## 2021-01-01 RX ADMIN — MEROPENEM 100 MILLIGRAM(S): 1 INJECTION INTRAVENOUS at 13:18

## 2021-01-01 RX ADMIN — Medication 500 MICROGRAM(S): at 06:30

## 2021-01-01 RX ADMIN — Medication 1: at 14:07

## 2021-01-01 RX ADMIN — Medication 25 GRAM(S): at 12:03

## 2021-01-01 RX ADMIN — MIRTAZAPINE 7.5 MILLIGRAM(S): 45 TABLET, ORALLY DISINTEGRATING ORAL at 12:24

## 2021-01-01 RX ADMIN — Medication 15 GRAM(S): at 11:22

## 2021-01-01 RX ADMIN — Medication 11.8 MICROGRAM(S)/KG/MIN: at 21:47

## 2021-01-01 RX ADMIN — Medication 12.5 MILLIGRAM(S): at 06:40

## 2021-01-01 RX ADMIN — Medication 62.5 MILLIMOLE(S): at 03:19

## 2021-01-01 RX ADMIN — Medication 125 MILLIGRAM(S): at 05:08

## 2021-01-01 RX ADMIN — Medication 400 MILLIGRAM(S): at 21:48

## 2021-01-01 RX ADMIN — Medication 1: at 13:15

## 2021-01-01 RX ADMIN — SIMETHICONE 80 MILLIGRAM(S): 80 TABLET, CHEWABLE ORAL at 11:29

## 2021-01-01 RX ADMIN — SERTRALINE 50 MILLIGRAM(S): 25 TABLET, FILM COATED ORAL at 11:39

## 2021-01-01 RX ADMIN — Medication 125 MILLIGRAM(S): at 13:44

## 2021-01-01 RX ADMIN — Medication 50 MILLILITER(S): at 03:02

## 2021-01-01 RX ADMIN — Medication 10 MILLIGRAM(S): at 13:00

## 2021-01-01 RX ADMIN — Medication 125 MILLILITER(S): at 11:23

## 2021-01-01 RX ADMIN — Medication 40 MILLIEQUIVALENT(S): at 02:35

## 2021-01-01 RX ADMIN — Medication 30 MILLIGRAM(S): at 07:44

## 2021-01-01 RX ADMIN — CHLORHEXIDINE GLUCONATE 15 MILLILITER(S): 213 SOLUTION TOPICAL at 05:29

## 2021-01-01 RX ADMIN — HEPARIN SODIUM 5000 UNIT(S): 5000 INJECTION INTRAVENOUS; SUBCUTANEOUS at 21:42

## 2021-01-01 RX ADMIN — Medication 10 MILLIGRAM(S): at 09:44

## 2021-01-01 RX ADMIN — MEROPENEM 100 MILLIGRAM(S): 1 INJECTION INTRAVENOUS at 05:12

## 2021-01-01 RX ADMIN — Medication 125 MILLILITER(S): at 04:46

## 2021-01-01 RX ADMIN — CHLORHEXIDINE GLUCONATE 15 MILLILITER(S): 213 SOLUTION TOPICAL at 06:02

## 2021-01-01 RX ADMIN — Medication 10 MILLIGRAM(S): at 14:29

## 2021-01-01 RX ADMIN — SODIUM CHLORIDE 3 MILLILITER(S): 9 INJECTION INTRAMUSCULAR; INTRAVENOUS; SUBCUTANEOUS at 21:57

## 2021-01-01 RX ADMIN — Medication 250 MILLIGRAM(S): at 05:16

## 2021-01-01 RX ADMIN — Medication 25 MILLIGRAM(S): at 05:40

## 2021-01-01 RX ADMIN — FENTANYL CITRATE 50 MICROGRAM(S): 50 INJECTION INTRAVENOUS at 08:05

## 2021-01-01 RX ADMIN — Medication 500 MILLIGRAM(S): at 06:46

## 2021-01-01 RX ADMIN — Medication 250 MILLIGRAM(S): at 19:00

## 2021-01-01 RX ADMIN — Medication 50 MILLIEQUIVALENT(S): at 11:10

## 2021-01-01 RX ADMIN — Medication 50 MILLIEQUIVALENT(S): at 11:54

## 2021-01-01 RX ADMIN — Medication 25 MILLIGRAM(S): at 06:29

## 2021-01-01 RX ADMIN — Medication 100 MILLIGRAM(S): at 21:59

## 2021-01-01 RX ADMIN — SERTRALINE 100 MILLIGRAM(S): 25 TABLET, FILM COATED ORAL at 12:24

## 2021-01-01 RX ADMIN — POTASSIUM PHOSPHATE, MONOBASIC POTASSIUM PHOSPHATE, DIBASIC 62.5 MILLIMOLE(S): 236; 224 INJECTION, SOLUTION INTRAVENOUS at 05:04

## 2021-01-01 RX ADMIN — Medication 50 MILLIEQUIVALENT(S): at 17:16

## 2021-01-01 RX ADMIN — SPIRONOLACTONE 25 MILLIGRAM(S): 25 TABLET, FILM COATED ORAL at 06:52

## 2021-01-01 RX ADMIN — Medication 1 TABLET(S): at 16:32

## 2021-01-01 RX ADMIN — DEXMEDETOMIDINE HYDROCHLORIDE IN 0.9% SODIUM CHLORIDE 9.81 MICROGRAM(S)/KG/HR: 4 INJECTION INTRAVENOUS at 07:27

## 2021-01-01 RX ADMIN — LIDOCAINE 1 PATCH: 4 CREAM TOPICAL at 00:02

## 2021-01-01 RX ADMIN — Medication 100 MILLIGRAM(S): at 22:12

## 2021-01-01 RX ADMIN — Medication 500 MILLIGRAM(S): at 05:04

## 2021-01-01 RX ADMIN — Medication 50 MILLIEQUIVALENT(S): at 10:39

## 2021-01-01 RX ADMIN — ETOMIDATE 10 MILLIGRAM(S): 2 INJECTION INTRAVENOUS at 18:30

## 2021-01-01 RX ADMIN — Medication 500 MILLIGRAM(S): at 00:30

## 2021-01-01 RX ADMIN — HUMAN INSULIN 12 UNIT(S): 100 INJECTION, SUSPENSION SUBCUTANEOUS at 05:21

## 2021-01-01 RX ADMIN — Medication 10 MILLIGRAM(S): at 22:21

## 2021-01-01 RX ADMIN — SODIUM CHLORIDE 3 MILLILITER(S): 9 INJECTION INTRAMUSCULAR; INTRAVENOUS; SUBCUTANEOUS at 06:11

## 2021-01-01 RX ADMIN — MEROPENEM 100 MILLIGRAM(S): 1 INJECTION INTRAVENOUS at 05:11

## 2021-01-01 RX ADMIN — MAGNESIUM OXIDE 400 MG ORAL TABLET 400 MILLIGRAM(S): 241.3 TABLET ORAL at 06:19

## 2021-01-01 RX ADMIN — Medication 25 MILLIGRAM(S): at 06:06

## 2021-01-01 RX ADMIN — ONDANSETRON 4 MILLIGRAM(S): 8 TABLET, FILM COATED ORAL at 09:22

## 2021-01-01 RX ADMIN — Medication 125 MILLIGRAM(S): at 18:12

## 2021-01-01 RX ADMIN — CHLORHEXIDINE GLUCONATE 15 MILLILITER(S): 213 SOLUTION TOPICAL at 17:29

## 2021-01-01 RX ADMIN — ONDANSETRON 8 MILLIGRAM(S): 8 TABLET, FILM COATED ORAL at 14:44

## 2021-01-01 RX ADMIN — SERTRALINE 100 MILLIGRAM(S): 25 TABLET, FILM COATED ORAL at 12:48

## 2021-01-01 RX ADMIN — FLUCONAZOLE 50 MILLIGRAM(S): 150 TABLET ORAL at 23:51

## 2021-01-01 RX ADMIN — SODIUM CHLORIDE 1000 MILLILITER(S): 9 INJECTION INTRAMUSCULAR; INTRAVENOUS; SUBCUTANEOUS at 17:12

## 2021-01-01 RX ADMIN — HEPARIN SODIUM 10 UNIT(S)/HR: 5000 INJECTION INTRAVENOUS; SUBCUTANEOUS at 16:07

## 2021-01-01 RX ADMIN — SERTRALINE 100 MILLIGRAM(S): 25 TABLET, FILM COATED ORAL at 11:48

## 2021-01-01 RX ADMIN — Medication 125 MILLIGRAM(S): at 18:02

## 2021-01-01 RX ADMIN — Medication 50 GRAM(S): at 12:46

## 2021-01-01 RX ADMIN — CEFEPIME 100 MILLIGRAM(S): 1 INJECTION, POWDER, FOR SOLUTION INTRAMUSCULAR; INTRAVENOUS at 21:39

## 2021-01-01 RX ADMIN — Medication 1 TABLET(S): at 06:15

## 2021-01-01 RX ADMIN — Medication 2: at 00:59

## 2021-01-01 RX ADMIN — Medication 25 MILLIGRAM(S): at 17:06

## 2021-01-01 RX ADMIN — Medication 500 MILLIGRAM(S): at 23:00

## 2021-01-01 RX ADMIN — Medication 10 MILLIGRAM(S): at 11:28

## 2021-01-01 RX ADMIN — Medication 10 MILLIGRAM(S): at 13:32

## 2021-01-01 RX ADMIN — Medication 318.75 MILLIGRAM(S): at 12:18

## 2021-01-01 RX ADMIN — Medication 1: at 01:00

## 2021-01-01 RX ADMIN — SODIUM CHLORIDE 50 MILLILITER(S): 9 INJECTION, SOLUTION INTRAVENOUS at 11:03

## 2021-01-01 RX ADMIN — Medication 50 GRAM(S): at 14:53

## 2021-01-01 RX ADMIN — Medication 0.25 MILLIGRAM(S): at 21:42

## 2021-01-01 RX ADMIN — CHLORHEXIDINE GLUCONATE 1 APPLICATION(S): 213 SOLUTION TOPICAL at 07:43

## 2021-01-01 RX ADMIN — Medication 500 MILLIGRAM(S): at 17:20

## 2021-01-01 RX ADMIN — Medication 1 TABLET(S): at 11:01

## 2021-01-01 RX ADMIN — SERTRALINE 100 MILLIGRAM(S): 25 TABLET, FILM COATED ORAL at 12:00

## 2021-01-01 RX ADMIN — CHOLESTYRAMINE 4 GRAM(S): 4 POWDER, FOR SUSPENSION ORAL at 06:18

## 2021-01-01 RX ADMIN — Medication 100 GRAM(S): at 11:38

## 2021-01-01 RX ADMIN — Medication 1 TABLET(S): at 13:23

## 2021-01-01 RX ADMIN — Medication 250 MILLIGRAM(S): at 05:42

## 2021-01-01 RX ADMIN — Medication 81 MILLIGRAM(S): at 13:46

## 2021-01-01 RX ADMIN — MIRTAZAPINE 7.5 MILLIGRAM(S): 45 TABLET, ORALLY DISINTEGRATING ORAL at 21:19

## 2021-01-01 RX ADMIN — MAGNESIUM OXIDE 400 MG ORAL TABLET 400 MILLIGRAM(S): 241.3 TABLET ORAL at 18:31

## 2021-01-01 RX ADMIN — Medication 250 MILLIGRAM(S): at 07:44

## 2021-01-01 RX ADMIN — Medication 1 TABLET(S): at 17:16

## 2021-01-01 RX ADMIN — Medication 5 MILLIGRAM(S): at 12:10

## 2021-01-01 RX ADMIN — CHLORHEXIDINE GLUCONATE 1 APPLICATION(S): 213 SOLUTION TOPICAL at 05:56

## 2021-01-01 RX ADMIN — DEXMEDETOMIDINE HYDROCHLORIDE IN 0.9% SODIUM CHLORIDE 9.81 MICROGRAM(S)/KG/HR: 4 INJECTION INTRAVENOUS at 20:50

## 2021-01-01 RX ADMIN — Medication 5 MILLIGRAM(S): at 05:58

## 2021-01-01 RX ADMIN — SPIRONOLACTONE 25 MILLIGRAM(S): 25 TABLET, FILM COATED ORAL at 08:45

## 2021-01-01 RX ADMIN — Medication 100 MILLIGRAM(S): at 13:45

## 2021-01-01 RX ADMIN — SODIUM CHLORIDE 3 MILLILITER(S): 9 INJECTION INTRAMUSCULAR; INTRAVENOUS; SUBCUTANEOUS at 21:16

## 2021-01-01 RX ADMIN — SODIUM CHLORIDE 10 MILLILITER(S): 9 INJECTION INTRAMUSCULAR; INTRAVENOUS; SUBCUTANEOUS at 07:26

## 2021-01-01 RX ADMIN — FENTANYL CITRATE 12.5 MICROGRAM(S): 50 INJECTION INTRAVENOUS at 16:30

## 2021-01-01 RX ADMIN — SPIRONOLACTONE 12.5 MILLIGRAM(S): 25 TABLET, FILM COATED ORAL at 05:08

## 2021-01-01 RX ADMIN — CEFEPIME 100 MILLIGRAM(S): 1 INJECTION, POWDER, FOR SOLUTION INTRAMUSCULAR; INTRAVENOUS at 18:04

## 2021-01-01 RX ADMIN — DEXTROSE MONOHYDRATE, SODIUM CHLORIDE, AND POTASSIUM CHLORIDE 50 MILLILITER(S): 50; .745; 4.5 INJECTION, SOLUTION INTRAVENOUS at 22:19

## 2021-01-01 RX ADMIN — ONDANSETRON 8 MILLIGRAM(S): 8 TABLET, FILM COATED ORAL at 21:30

## 2021-01-01 RX ADMIN — CHLORHEXIDINE GLUCONATE 15 MILLILITER(S): 213 SOLUTION TOPICAL at 19:09

## 2021-01-01 RX ADMIN — Medication 50 MILLIGRAM(S): at 17:53

## 2021-01-01 RX ADMIN — MEROPENEM 100 MILLIGRAM(S): 1 INJECTION INTRAVENOUS at 21:45

## 2021-01-01 RX ADMIN — PANTOPRAZOLE SODIUM 40 MILLIGRAM(S): 20 TABLET, DELAYED RELEASE ORAL at 11:22

## 2021-01-01 RX ADMIN — CEFEPIME 100 MILLIGRAM(S): 1 INJECTION, POWDER, FOR SOLUTION INTRAMUSCULAR; INTRAVENOUS at 13:21

## 2021-01-01 RX ADMIN — Medication 50 GRAM(S): at 02:00

## 2021-01-01 RX ADMIN — SERTRALINE 50 MILLIGRAM(S): 25 TABLET, FILM COATED ORAL at 11:33

## 2021-01-01 RX ADMIN — Medication 50 MILLIEQUIVALENT(S): at 14:01

## 2021-01-01 RX ADMIN — HEPARIN SODIUM 5000 UNIT(S): 5000 INJECTION INTRAVENOUS; SUBCUTANEOUS at 22:09

## 2021-01-01 RX ADMIN — Medication 81 MILLIGRAM(S): at 11:29

## 2021-01-01 RX ADMIN — Medication 50 MILLIEQUIVALENT(S): at 20:48

## 2021-01-01 RX ADMIN — Medication 2: at 06:56

## 2021-01-01 RX ADMIN — Medication 125 MILLIGRAM(S): at 05:27

## 2021-01-01 RX ADMIN — Medication 1 TABLET(S): at 05:18

## 2021-01-01 RX ADMIN — Medication 4: at 12:18

## 2021-01-01 RX ADMIN — LOSARTAN POTASSIUM 25 MILLIGRAM(S): 100 TABLET, FILM COATED ORAL at 05:42

## 2021-01-01 RX ADMIN — Medication 10 MILLIGRAM(S): at 06:18

## 2021-01-01 RX ADMIN — Medication 100 MILLIGRAM(S): at 11:28

## 2021-01-01 RX ADMIN — PANTOPRAZOLE SODIUM 40 MILLIGRAM(S): 20 TABLET, DELAYED RELEASE ORAL at 19:09

## 2021-01-01 RX ADMIN — Medication 100 MILLIGRAM(S): at 11:12

## 2021-01-01 RX ADMIN — Medication 1 TABLET(S): at 21:59

## 2021-01-01 RX ADMIN — Medication 10 MILLIGRAM(S): at 22:02

## 2021-01-01 RX ADMIN — Medication 40 MILLIGRAM(S): at 05:26

## 2021-01-01 RX ADMIN — MEROPENEM 100 MILLIGRAM(S): 1 INJECTION INTRAVENOUS at 14:17

## 2021-01-01 RX ADMIN — HEPARIN SODIUM 10 UNIT(S)/HR: 5000 INJECTION INTRAVENOUS; SUBCUTANEOUS at 01:26

## 2021-01-01 RX ADMIN — Medication 6 MILLIGRAM(S): at 08:52

## 2021-01-01 RX ADMIN — POLYETHYLENE GLYCOL 3350 17 GRAM(S): 17 POWDER, FOR SOLUTION ORAL at 16:41

## 2021-01-01 RX ADMIN — HEPARIN SODIUM 5000 UNIT(S): 5000 INJECTION INTRAVENOUS; SUBCUTANEOUS at 13:20

## 2021-01-01 RX ADMIN — Medication 500 MILLIGRAM(S): at 06:36

## 2021-01-01 RX ADMIN — Medication 125 MILLIGRAM(S): at 14:23

## 2021-01-01 RX ADMIN — CHLORHEXIDINE GLUCONATE 1 APPLICATION(S): 213 SOLUTION TOPICAL at 06:41

## 2021-01-01 RX ADMIN — HEPARIN SODIUM 6 UNIT(S)/HR: 5000 INJECTION INTRAVENOUS; SUBCUTANEOUS at 20:45

## 2021-01-01 RX ADMIN — FENTANYL CITRATE 50 MICROGRAM(S): 50 INJECTION INTRAVENOUS at 14:45

## 2021-01-01 RX ADMIN — CHLORHEXIDINE GLUCONATE 15 MILLILITER(S): 213 SOLUTION TOPICAL at 17:50

## 2021-01-01 RX ADMIN — SODIUM CHLORIDE 3 MILLILITER(S): 9 INJECTION INTRAMUSCULAR; INTRAVENOUS; SUBCUTANEOUS at 05:34

## 2021-01-01 RX ADMIN — Medication 20 MILLIEQUIVALENT(S): at 15:30

## 2021-01-01 RX ADMIN — REMDESIVIR 500 MILLIGRAM(S): 5 INJECTION INTRAVENOUS at 17:14

## 2021-01-01 RX ADMIN — Medication 125 MILLILITER(S): at 21:51

## 2021-01-01 RX ADMIN — Medication 500 MILLIGRAM(S): at 05:43

## 2021-01-01 RX ADMIN — DEXMEDETOMIDINE HYDROCHLORIDE IN 0.9% SODIUM CHLORIDE 9.81 MICROGRAM(S)/KG/HR: 4 INJECTION INTRAVENOUS at 08:26

## 2021-01-01 RX ADMIN — Medication 650 MILLIGRAM(S): at 21:28

## 2021-01-01 RX ADMIN — CEFEPIME 100 MILLIGRAM(S): 1 INJECTION, POWDER, FOR SOLUTION INTRAMUSCULAR; INTRAVENOUS at 21:02

## 2021-01-01 RX ADMIN — CEFEPIME 100 MILLIGRAM(S): 1 INJECTION, POWDER, FOR SOLUTION INTRAMUSCULAR; INTRAVENOUS at 05:14

## 2021-01-01 RX ADMIN — Medication 100 GRAM(S): at 10:06

## 2021-01-01 RX ADMIN — SODIUM CHLORIDE 3 MILLILITER(S): 9 INJECTION INTRAMUSCULAR; INTRAVENOUS; SUBCUTANEOUS at 15:27

## 2021-01-01 RX ADMIN — MAGNESIUM OXIDE 400 MG ORAL TABLET 400 MILLIGRAM(S): 241.3 TABLET ORAL at 18:11

## 2021-01-01 RX ADMIN — CHLORHEXIDINE GLUCONATE 1 APPLICATION(S): 213 SOLUTION TOPICAL at 06:05

## 2021-01-01 RX ADMIN — MIRTAZAPINE 7.5 MILLIGRAM(S): 45 TABLET, ORALLY DISINTEGRATING ORAL at 13:08

## 2021-01-01 RX ADMIN — Medication 125 MILLIGRAM(S): at 14:31

## 2021-01-01 RX ADMIN — AMIODARONE HYDROCHLORIDE 200 MILLIGRAM(S): 400 TABLET ORAL at 05:19

## 2021-01-01 RX ADMIN — MEROPENEM 100 MILLIGRAM(S): 1 INJECTION INTRAVENOUS at 13:17

## 2021-01-01 RX ADMIN — Medication 1: at 17:45

## 2021-01-01 RX ADMIN — Medication 400 MILLIGRAM(S): at 14:37

## 2021-01-01 RX ADMIN — Medication 125 MILLILITER(S): at 19:29

## 2021-01-01 RX ADMIN — Medication 100 MILLIGRAM(S): at 12:02

## 2021-01-01 RX ADMIN — Medication 100 MILLIGRAM(S): at 22:41

## 2021-01-01 RX ADMIN — Medication 650 MILLIGRAM(S): at 23:04

## 2021-01-01 RX ADMIN — Medication 125 MILLIGRAM(S): at 00:04

## 2021-01-01 RX ADMIN — Medication 100 GRAM(S): at 00:40

## 2021-01-01 RX ADMIN — Medication 81 MILLIGRAM(S): at 13:57

## 2021-01-01 RX ADMIN — Medication 500 MILLIGRAM(S): at 23:50

## 2021-01-01 RX ADMIN — Medication 25 GRAM(S): at 04:00

## 2021-01-01 RX ADMIN — Medication 1 MILLIGRAM(S): at 14:27

## 2021-01-01 RX ADMIN — CHLORHEXIDINE GLUCONATE 1 APPLICATION(S): 213 SOLUTION TOPICAL at 05:15

## 2021-01-01 RX ADMIN — CHLORHEXIDINE GLUCONATE 15 MILLILITER(S): 213 SOLUTION TOPICAL at 06:05

## 2021-01-01 RX ADMIN — Medication 2.5 MILLIGRAM(S): at 09:00

## 2021-01-01 RX ADMIN — HEPARIN SODIUM 5000 UNIT(S): 5000 INJECTION INTRAVENOUS; SUBCUTANEOUS at 13:14

## 2021-01-01 RX ADMIN — Medication 500 MILLIGRAM(S): at 00:34

## 2021-01-01 RX ADMIN — Medication 50 MILLIEQUIVALENT(S): at 09:20

## 2021-01-01 RX ADMIN — Medication 1: at 17:07

## 2021-01-01 RX ADMIN — Medication 125 MILLIGRAM(S): at 05:53

## 2021-01-01 RX ADMIN — MIRTAZAPINE 7.5 MILLIGRAM(S): 45 TABLET, ORALLY DISINTEGRATING ORAL at 21:08

## 2021-01-01 RX ADMIN — Medication 125 MILLIGRAM(S): at 17:01

## 2021-01-01 RX ADMIN — Medication 40 MILLIGRAM(S): at 05:06

## 2021-01-01 RX ADMIN — Medication 1: at 12:40

## 2021-01-01 RX ADMIN — MIRTAZAPINE 7.5 MILLIGRAM(S): 45 TABLET, ORALLY DISINTEGRATING ORAL at 12:41

## 2021-01-01 RX ADMIN — SODIUM CHLORIDE 30 MILLILITER(S): 9 INJECTION, SOLUTION INTRAVENOUS at 03:35

## 2021-01-01 RX ADMIN — Medication 10 MILLIGRAM(S): at 21:21

## 2021-01-01 RX ADMIN — Medication 10 MILLIGRAM(S): at 13:19

## 2021-01-01 RX ADMIN — Medication 10 MILLIGRAM(S): at 13:59

## 2021-01-01 RX ADMIN — Medication 500 MILLIGRAM(S): at 17:16

## 2021-01-01 RX ADMIN — Medication 50 GRAM(S): at 03:06

## 2021-01-01 RX ADMIN — Medication 0.5 MILLIGRAM(S): at 14:25

## 2021-01-01 RX ADMIN — Medication 10 MILLIGRAM(S): at 23:15

## 2021-01-01 RX ADMIN — HUMAN INSULIN 4 UNIT(S): 100 INJECTION, SUSPENSION SUBCUTANEOUS at 17:13

## 2021-01-01 RX ADMIN — Medication 20 MILLIEQUIVALENT(S): at 06:04

## 2021-01-01 RX ADMIN — Medication 10 MILLIGRAM(S): at 14:56

## 2021-01-01 RX ADMIN — Medication 60 MILLIGRAM(S): at 06:19

## 2021-01-01 RX ADMIN — CEFEPIME 100 MILLIGRAM(S): 1 INJECTION, POWDER, FOR SOLUTION INTRAMUSCULAR; INTRAVENOUS at 16:32

## 2021-01-01 RX ADMIN — PIPERACILLIN AND TAZOBACTAM 25 GRAM(S): 4; .5 INJECTION, POWDER, LYOPHILIZED, FOR SOLUTION INTRAVENOUS at 17:10

## 2021-01-01 RX ADMIN — Medication 20 MILLIGRAM(S): at 22:41

## 2021-01-01 RX ADMIN — HEPARIN SODIUM 5000 UNIT(S): 5000 INJECTION INTRAVENOUS; SUBCUTANEOUS at 21:40

## 2021-01-01 RX ADMIN — Medication 50 GRAM(S): at 01:30

## 2021-01-01 RX ADMIN — Medication 25 GRAM(S): at 08:31

## 2021-01-01 RX ADMIN — Medication 250 MILLIGRAM(S): at 18:40

## 2021-01-01 RX ADMIN — LIDOCAINE 1 PATCH: 4 CREAM TOPICAL at 19:00

## 2021-01-01 RX ADMIN — Medication 2: at 06:54

## 2021-01-01 RX ADMIN — PANTOPRAZOLE SODIUM 40 MILLIGRAM(S): 20 TABLET, DELAYED RELEASE ORAL at 06:57

## 2021-01-01 RX ADMIN — PIPERACILLIN AND TAZOBACTAM 25 GRAM(S): 4; .5 INJECTION, POWDER, LYOPHILIZED, FOR SOLUTION INTRAVENOUS at 01:56

## 2021-01-01 RX ADMIN — Medication 100 MILLIGRAM(S): at 11:01

## 2021-01-01 RX ADMIN — CEFEPIME 100 MILLIGRAM(S): 1 INJECTION, POWDER, FOR SOLUTION INTRAMUSCULAR; INTRAVENOUS at 14:22

## 2021-01-01 RX ADMIN — Medication 50 MILLIEQUIVALENT(S): at 13:48

## 2021-01-01 RX ADMIN — FENTANYL CITRATE 25 MICROGRAM(S): 50 INJECTION INTRAVENOUS at 15:30

## 2021-01-01 RX ADMIN — HYDROMORPHONE HYDROCHLORIDE 0.5 MILLIGRAM(S): 2 INJECTION INTRAMUSCULAR; INTRAVENOUS; SUBCUTANEOUS at 01:45

## 2021-01-01 RX ADMIN — HYDROMORPHONE HYDROCHLORIDE 1 MILLIGRAM(S): 2 INJECTION INTRAMUSCULAR; INTRAVENOUS; SUBCUTANEOUS at 11:00

## 2021-01-01 RX ADMIN — Medication 125 MILLILITER(S): at 04:53

## 2021-01-01 RX ADMIN — Medication 0.5 MILLIGRAM(S): at 05:39

## 2021-01-01 RX ADMIN — MEROPENEM 100 MILLIGRAM(S): 1 INJECTION INTRAVENOUS at 07:43

## 2021-01-01 RX ADMIN — CHLORHEXIDINE GLUCONATE 15 MILLILITER(S): 213 SOLUTION TOPICAL at 05:10

## 2021-01-01 RX ADMIN — Medication 25 MILLIGRAM(S): at 06:27

## 2021-01-01 RX ADMIN — PIPERACILLIN AND TAZOBACTAM 25 GRAM(S): 4; .5 INJECTION, POWDER, LYOPHILIZED, FOR SOLUTION INTRAVENOUS at 18:06

## 2021-01-01 RX ADMIN — SPIRONOLACTONE 12.5 MILLIGRAM(S): 25 TABLET, FILM COATED ORAL at 17:45

## 2021-01-01 RX ADMIN — CHLORHEXIDINE GLUCONATE 15 MILLILITER(S): 213 SOLUTION TOPICAL at 05:53

## 2021-01-01 RX ADMIN — FENTANYL CITRATE 12 MICROGRAM(S): 50 INJECTION INTRAVENOUS at 20:47

## 2021-01-01 RX ADMIN — FENTANYL CITRATE 50 MICROGRAM(S): 50 INJECTION INTRAVENOUS at 01:02

## 2021-01-01 RX ADMIN — Medication 50 GRAM(S): at 03:13

## 2021-01-01 RX ADMIN — SERTRALINE 100 MILLIGRAM(S): 25 TABLET, FILM COATED ORAL at 18:00

## 2021-01-01 RX ADMIN — Medication 10 MILLIGRAM(S): at 06:13

## 2021-01-01 RX ADMIN — Medication 100 MILLIGRAM(S): at 11:54

## 2021-01-01 RX ADMIN — Medication 50 MILLIEQUIVALENT(S): at 06:46

## 2021-01-01 RX ADMIN — SODIUM CHLORIDE 50 MILLILITER(S): 9 INJECTION, SOLUTION INTRAVENOUS at 17:13

## 2021-01-01 RX ADMIN — HEPARIN SODIUM 5000 UNIT(S): 5000 INJECTION INTRAVENOUS; SUBCUTANEOUS at 21:44

## 2021-01-01 RX ADMIN — ENOXAPARIN SODIUM 60 MILLIGRAM(S): 100 INJECTION SUBCUTANEOUS at 22:36

## 2021-01-01 RX ADMIN — FENTANYL CITRATE 12.5 MICROGRAM(S): 50 INJECTION INTRAVENOUS at 13:45

## 2021-01-01 RX ADMIN — AMIODARONE HYDROCHLORIDE 200 MILLIGRAM(S): 400 TABLET ORAL at 05:13

## 2021-01-01 RX ADMIN — Medication 318.75 MILLIGRAM(S): at 01:28

## 2021-01-01 RX ADMIN — HUMAN INSULIN 4 UNIT(S): 100 INJECTION, SUSPENSION SUBCUTANEOUS at 13:06

## 2021-01-01 RX ADMIN — Medication 7.5 MG/MIN: at 21:52

## 2021-01-01 RX ADMIN — HEPARIN SODIUM 5000 UNIT(S): 5000 INJECTION INTRAVENOUS; SUBCUTANEOUS at 05:27

## 2021-01-01 RX ADMIN — Medication 50 GRAM(S): at 12:15

## 2021-01-01 RX ADMIN — Medication 100 GRAM(S): at 03:08

## 2021-01-01 RX ADMIN — Medication 125 MILLIGRAM(S): at 06:20

## 2021-01-01 RX ADMIN — Medication 25 MILLIGRAM(S): at 17:34

## 2021-01-01 RX ADMIN — SODIUM CHLORIDE 3 MILLILITER(S): 9 INJECTION INTRAMUSCULAR; INTRAVENOUS; SUBCUTANEOUS at 13:38

## 2021-01-01 RX ADMIN — Medication 1: at 11:36

## 2021-01-01 RX ADMIN — Medication 100 MILLIGRAM(S): at 05:24

## 2021-01-01 RX ADMIN — FENTANYL CITRATE 12 MICROGRAM(S): 50 INJECTION INTRAVENOUS at 17:00

## 2021-01-01 RX ADMIN — SODIUM CHLORIDE 30 MILLILITER(S): 9 INJECTION, SOLUTION INTRAVENOUS at 21:31

## 2021-01-01 RX ADMIN — Medication 10 MILLIGRAM(S): at 18:09

## 2021-01-01 RX ADMIN — ONDANSETRON 8 MILLIGRAM(S): 8 TABLET, FILM COATED ORAL at 06:41

## 2021-01-01 RX ADMIN — CEFEPIME 100 MILLIGRAM(S): 1 INJECTION, POWDER, FOR SOLUTION INTRAMUSCULAR; INTRAVENOUS at 12:10

## 2021-01-01 RX ADMIN — Medication 5 MILLIGRAM(S): at 21:22

## 2021-01-01 RX ADMIN — Medication 40 MILLIGRAM(S): at 05:04

## 2021-01-01 RX ADMIN — ENOXAPARIN SODIUM 60 MILLIGRAM(S): 100 INJECTION SUBCUTANEOUS at 09:09

## 2021-01-01 RX ADMIN — Medication 125 MILLILITER(S): at 17:04

## 2021-01-01 RX ADMIN — Medication 2: at 18:35

## 2021-01-01 RX ADMIN — Medication 100 MILLIGRAM(S): at 14:31

## 2021-01-01 RX ADMIN — PANTOPRAZOLE SODIUM 40 MILLIGRAM(S): 20 TABLET, DELAYED RELEASE ORAL at 06:51

## 2021-01-01 RX ADMIN — Medication 500 MICROGRAM(S): at 12:41

## 2021-01-01 RX ADMIN — Medication 20 MILLIEQUIVALENT(S): at 14:59

## 2021-01-01 RX ADMIN — Medication 100 MILLIGRAM(S): at 13:53

## 2021-01-01 RX ADMIN — PANTOPRAZOLE SODIUM 40 MILLIGRAM(S): 20 TABLET, DELAYED RELEASE ORAL at 12:38

## 2021-01-01 RX ADMIN — PROPOFOL 7.39 MICROGRAM(S)/KG/MIN: 10 INJECTION, EMULSION INTRAVENOUS at 21:35

## 2021-01-01 RX ADMIN — MEROPENEM 100 MILLIGRAM(S): 1 INJECTION INTRAVENOUS at 21:22

## 2021-01-01 RX ADMIN — Medication 50 MILLIEQUIVALENT(S): at 01:45

## 2021-01-01 RX ADMIN — SODIUM CHLORIDE 3 MILLILITER(S): 9 INJECTION INTRAMUSCULAR; INTRAVENOUS; SUBCUTANEOUS at 15:00

## 2021-01-01 RX ADMIN — SPIRONOLACTONE 25 MILLIGRAM(S): 25 TABLET, FILM COATED ORAL at 06:20

## 2021-01-01 RX ADMIN — CHLORHEXIDINE GLUCONATE 15 MILLILITER(S): 213 SOLUTION TOPICAL at 18:44

## 2021-01-01 RX ADMIN — MIRTAZAPINE 7.5 MILLIGRAM(S): 45 TABLET, ORALLY DISINTEGRATING ORAL at 13:05

## 2021-01-01 RX ADMIN — Medication 40 MILLIEQUIVALENT(S): at 06:17

## 2021-01-01 RX ADMIN — Medication 62.5 MILLIMOLE(S): at 13:39

## 2021-01-01 RX ADMIN — Medication 50 MILLIEQUIVALENT(S): at 06:45

## 2021-01-01 RX ADMIN — VASOPRESSIN 2 UNIT(S)/MIN: 20 INJECTION INTRAVENOUS at 08:45

## 2021-01-01 RX ADMIN — HEPARIN SODIUM 5000 UNIT(S): 5000 INJECTION INTRAVENOUS; SUBCUTANEOUS at 13:29

## 2021-01-01 RX ADMIN — Medication 400 MILLIGRAM(S): at 17:57

## 2021-01-01 RX ADMIN — Medication 318.75 MILLIGRAM(S): at 05:29

## 2021-01-01 RX ADMIN — SERTRALINE 100 MILLIGRAM(S): 25 TABLET, FILM COATED ORAL at 12:09

## 2021-01-01 RX ADMIN — Medication 25 GRAM(S): at 12:58

## 2021-01-01 RX ADMIN — Medication 10 MILLIGRAM(S): at 06:09

## 2021-01-01 RX ADMIN — HEPARIN SODIUM 5000 UNIT(S): 5000 INJECTION INTRAVENOUS; SUBCUTANEOUS at 14:31

## 2021-01-01 RX ADMIN — Medication 0.5 MILLIGRAM(S): at 13:22

## 2021-01-01 RX ADMIN — Medication 1500 MILLILITER(S): at 01:45

## 2021-01-01 RX ADMIN — DEXMEDETOMIDINE HYDROCHLORIDE IN 0.9% SODIUM CHLORIDE 9.81 MICROGRAM(S)/KG/HR: 4 INJECTION INTRAVENOUS at 18:06

## 2021-01-01 RX ADMIN — KETAMINE HYDROCHLORIDE 3.93 MG/KG/HR: 100 INJECTION INTRAMUSCULAR; INTRAVENOUS at 07:52

## 2021-01-01 RX ADMIN — CHLORHEXIDINE GLUCONATE 15 MILLILITER(S): 213 SOLUTION TOPICAL at 17:47

## 2021-01-01 RX ADMIN — Medication 300 MILLIGRAM(S): at 05:22

## 2021-01-01 RX ADMIN — HUMAN INSULIN 4 UNIT(S): 100 INJECTION, SUSPENSION SUBCUTANEOUS at 13:13

## 2021-01-01 RX ADMIN — Medication 50 MILLIGRAM(S): at 05:16

## 2021-01-01 RX ADMIN — Medication 250 MILLIGRAM(S): at 18:35

## 2021-01-01 RX ADMIN — SODIUM CHLORIDE 3 MILLILITER(S): 9 INJECTION INTRAMUSCULAR; INTRAVENOUS; SUBCUTANEOUS at 06:26

## 2021-01-01 RX ADMIN — MIRTAZAPINE 7.5 MILLIGRAM(S): 45 TABLET, ORALLY DISINTEGRATING ORAL at 22:20

## 2021-01-01 RX ADMIN — Medication 318.75 MILLIGRAM(S): at 01:10

## 2021-01-01 RX ADMIN — CEFEPIME 100 MILLIGRAM(S): 1 INJECTION, POWDER, FOR SOLUTION INTRAMUSCULAR; INTRAVENOUS at 14:20

## 2021-01-01 RX ADMIN — Medication 0.5 MILLIGRAM(S): at 21:42

## 2021-01-01 RX ADMIN — Medication 250 MILLIGRAM(S): at 06:09

## 2021-01-01 RX ADMIN — Medication 500 MILLIGRAM(S): at 00:16

## 2021-01-01 RX ADMIN — Medication 1 TABLET(S): at 12:54

## 2021-01-01 RX ADMIN — ONDANSETRON 8 MILLIGRAM(S): 8 TABLET, FILM COATED ORAL at 13:05

## 2021-01-01 RX ADMIN — Medication 5 MILLIGRAM(S): at 05:47

## 2021-01-01 RX ADMIN — PIPERACILLIN AND TAZOBACTAM 25 GRAM(S): 4; .5 INJECTION, POWDER, LYOPHILIZED, FOR SOLUTION INTRAVENOUS at 15:55

## 2021-01-01 RX ADMIN — Medication 1: at 11:55

## 2021-01-01 RX ADMIN — Medication 125 MILLILITER(S): at 10:30

## 2021-01-01 RX ADMIN — Medication 25 MILLIGRAM(S): at 11:25

## 2021-01-01 RX ADMIN — SODIUM CHLORIDE 3 MILLILITER(S): 9 INJECTION INTRAMUSCULAR; INTRAVENOUS; SUBCUTANEOUS at 12:40

## 2021-01-01 RX ADMIN — HEPARIN SODIUM 10 UNIT(S)/HR: 5000 INJECTION INTRAVENOUS; SUBCUTANEOUS at 01:22

## 2021-01-01 RX ADMIN — Medication 100 MILLIGRAM(S): at 13:34

## 2021-01-01 RX ADMIN — Medication 10 MILLIGRAM(S): at 06:06

## 2021-01-01 RX ADMIN — PANTOPRAZOLE SODIUM 40 MILLIGRAM(S): 20 TABLET, DELAYED RELEASE ORAL at 17:46

## 2021-01-01 RX ADMIN — Medication 50 GRAM(S): at 11:21

## 2021-01-01 RX ADMIN — PANTOPRAZOLE SODIUM 40 MILLIGRAM(S): 20 TABLET, DELAYED RELEASE ORAL at 05:04

## 2021-01-01 RX ADMIN — Medication 2: at 00:51

## 2021-01-01 RX ADMIN — VASOPRESSIN 2 UNIT(S)/MIN: 20 INJECTION INTRAVENOUS at 20:50

## 2021-01-01 RX ADMIN — FENTANYL CITRATE 25 MICROGRAM(S): 50 INJECTION INTRAVENOUS at 20:15

## 2021-01-01 RX ADMIN — OCTREOTIDE ACETATE 50 MICROGRAM(S): 200 INJECTION, SOLUTION INTRAVENOUS; SUBCUTANEOUS at 22:30

## 2021-01-01 RX ADMIN — Medication 50 MILLIEQUIVALENT(S): at 12:31

## 2021-01-01 RX ADMIN — HYDROMORPHONE HYDROCHLORIDE 0.5 MILLIGRAM(S): 2 INJECTION INTRAMUSCULAR; INTRAVENOUS; SUBCUTANEOUS at 16:45

## 2021-01-01 RX ADMIN — ONDANSETRON 8 MILLIGRAM(S): 8 TABLET, FILM COATED ORAL at 05:25

## 2021-01-01 RX ADMIN — Medication 125 MILLIGRAM(S): at 11:54

## 2021-01-01 RX ADMIN — Medication 25 MILLIGRAM(S): at 05:58

## 2021-01-01 RX ADMIN — Medication 1500 MILLILITER(S): at 01:15

## 2021-01-01 RX ADMIN — AMIODARONE HYDROCHLORIDE 200 MILLIGRAM(S): 400 TABLET ORAL at 05:38

## 2021-01-01 RX ADMIN — KETAMINE HYDROCHLORIDE 3.93 MG/KG/HR: 100 INJECTION INTRAMUSCULAR; INTRAVENOUS at 02:30

## 2021-01-01 RX ADMIN — FENTANYL CITRATE 50 MICROGRAM(S): 50 INJECTION INTRAVENOUS at 06:05

## 2021-01-01 RX ADMIN — Medication 12.5 MILLIGRAM(S): at 21:54

## 2021-01-01 RX ADMIN — Medication 100 MILLIGRAM(S): at 17:27

## 2021-01-01 RX ADMIN — ONDANSETRON 8 MILLIGRAM(S): 8 TABLET, FILM COATED ORAL at 13:38

## 2021-01-01 RX ADMIN — SERTRALINE 100 MILLIGRAM(S): 25 TABLET, FILM COATED ORAL at 12:53

## 2021-01-05 ENCOUNTER — RX RENEWAL (OUTPATIENT)
Age: 66
End: 2021-01-05

## 2021-01-05 ENCOUNTER — NON-APPOINTMENT (OUTPATIENT)
Age: 66
End: 2021-01-05

## 2021-01-07 ENCOUNTER — APPOINTMENT (OUTPATIENT)
Dept: HEART FAILURE | Facility: CLINIC | Age: 66
End: 2021-01-07
Payer: MEDICARE

## 2021-01-07 ENCOUNTER — APPOINTMENT (OUTPATIENT)
Dept: CV DIAGNOSITCS | Facility: HOSPITAL | Age: 66
End: 2021-01-07

## 2021-01-07 ENCOUNTER — OUTPATIENT (OUTPATIENT)
Dept: OUTPATIENT SERVICES | Facility: HOSPITAL | Age: 66
LOS: 1 days | End: 2021-01-07
Payer: MEDICARE

## 2021-01-07 ENCOUNTER — NON-APPOINTMENT (OUTPATIENT)
Age: 66
End: 2021-01-07

## 2021-01-07 VITALS
OXYGEN SATURATION: 95 % | TEMPERATURE: 97.4 F | RESPIRATION RATE: 16 BRPM | DIASTOLIC BLOOD PRESSURE: 67 MMHG | SYSTOLIC BLOOD PRESSURE: 104 MMHG | HEART RATE: 78 BPM | HEIGHT: 70 IN | WEIGHT: 180 LBS | BODY MASS INDEX: 25.77 KG/M2

## 2021-01-07 DIAGNOSIS — Z98.890 OTHER SPECIFIED POSTPROCEDURAL STATES: Chronic | ICD-10-CM

## 2021-01-07 DIAGNOSIS — Z95.811 PRESENCE OF HEART ASSIST DEVICE: ICD-10-CM

## 2021-01-07 DIAGNOSIS — Z95.811 PRESENCE OF HEART ASSIST DEVICE: Chronic | ICD-10-CM

## 2021-01-07 PROCEDURE — 99214 OFFICE O/P EST MOD 30 MIN: CPT

## 2021-01-07 PROCEDURE — 93306 TTE W/DOPPLER COMPLETE: CPT

## 2021-01-07 PROCEDURE — 99072 ADDL SUPL MATRL&STAF TM PHE: CPT

## 2021-01-07 PROCEDURE — 93306 TTE W/DOPPLER COMPLETE: CPT | Mod: 26

## 2021-01-07 PROCEDURE — 93750 INTERROGATION VAD IN PERSON: CPT

## 2021-01-07 PROCEDURE — 93000 ELECTROCARDIOGRAM COMPLETE: CPT

## 2021-01-07 RX ORDER — CEPHALEXIN 500 MG/1
500 TABLET ORAL 3 TIMES DAILY
Qty: 42 | Refills: 2 | Status: DISCONTINUED | COMMUNITY
Start: 2020-10-29 | End: 2021-01-07

## 2021-01-07 NOTE — HISTORY OF PRESENT ILLNESS
[FreeTextEntry1] : Mr. Quispe is a 64 year old man with ACC/AHA stage D systolic heart failure due to a nonischemic cardiomyopathy who presented with abdominal pain and cardiogenic shock in the fall of 2017. Due to inotropic dependence, he underwent HM2 LVAD implantation as destination therapy with TV annuloplasty ring on 9/12/2017. His post-operative course was largely uncomplicated. He had mild rectal bleeding due to internal hemorrhoids. He had one readmission due to infection of unclear etiology in December 2017. It was not thought to be related to the LVAD. His history is also notable for peripheral arterial disease with claudication, which is a contributing factor in the decision to not list him for heart transplantation. \par Patient was admitted to the hospital from  March 31 through 4/20 with COVID + was having low bp and meds were discontinued.\par Patient was again admitted from 9/7/20-9/18-20  s/p fall after a syncopal event. He went to [facility] from 9/18/20-9/25/20.\par Today, he overall feels better hestates his belly feels full. He says he has not been pooping. Ordered Miralax, colace.  \par He denies LVAD alarms. \par \par LVAD Interrogation:\par Device Type: HM2\par Speed: 8800\par Flow: 6.1\par Power: 5.5\par PI: 6.1\par MAP: 90 with doppler (/67, MAP: 79)\par EKG: SR 75 no ectopy\par WEIGHT: 180 with equipment\par Alarm: No alarms\par Back up battery: Primary Battery -706 Expires in 15 months \par Driveline dressing change: incision without erythema , swelling or drainage but dressing did have a small amount of drainage noted on it. Unable to express any drainage,  cx sent

## 2021-01-07 NOTE — PHYSICAL EXAM
[General Appearance - Well Developed] : well developed [Normal Appearance] : normal appearance [General Appearance - Well Nourished] : well nourished [Normal Conjunctiva] : the conjunctiva exhibited no abnormalities [Normal Jugular Venous V Waves Present] : normal jugular venous V waves present [Respiration, Rhythm And Depth] : normal respiratory rhythm and effort [Auscultation Breath Sounds / Voice Sounds] : lungs were clear to auscultation bilaterally [Heart Rate And Rhythm] : heart rate and rhythm were normal [Bowel Sounds] : normal bowel sounds [Abdomen Soft] : soft [Abdomen Tenderness] : non-tender [Abnormal Walk] : normal gait [Nail Clubbing] : no clubbing of the fingernails [Skin Color & Pigmentation] : normal skin color and pigmentation [] : no rash [Oriented To Time, Place, And Person] : oriented to person, place, and time [No Anxiety] : not feeling anxious [FreeTextEntry1] : +hum Of LVAD

## 2021-01-07 NOTE — DISCUSSION/SUMMARY
[___ Week(s)] : [unfilled] week(s) [FreeTextEntry1] : - LVAD: continue speed at 8800. Appears to be pulsatilie.  \par - Congestive heart failure: Metoprolol at currently doses. \par - Coumadin: Monitor INR closely and adjust warfarin accordingly, INR goal is 2.0-2.5. \par - Anemia: started on Iron, Folic acid, Vit. B12 and senna\par - Antiplatelets: Continue aspirin at 81mg every day. \par - PAD: Ongoing treatment by Dr. Lovelace. \par - CKD stage III with hyperkalemia; improved from prior \par - Clavicle FX: resolved\par - Driveline drainage. Will startDoxycycline 100 BID X 14 days\par - Education of LVAD system maintenance was reviewed with patient.\par - Supplies: Gary

## 2021-01-11 ENCOUNTER — INPATIENT (INPATIENT)
Facility: HOSPITAL | Age: 66
LOS: 8 days | Discharge: HOME | DRG: 809 | End: 2021-01-20
Attending: THORACIC SURGERY (CARDIOTHORACIC VASCULAR SURGERY) | Admitting: THORACIC SURGERY (CARDIOTHORACIC VASCULAR SURGERY)
Payer: MEDICARE

## 2021-01-11 VITALS
OXYGEN SATURATION: 98 % | RESPIRATION RATE: 17 BRPM | HEIGHT: 70 IN | SYSTOLIC BLOOD PRESSURE: 98 MMHG | WEIGHT: 199.96 LBS | TEMPERATURE: 98 F | HEART RATE: 58 BPM | DIASTOLIC BLOOD PRESSURE: 65 MMHG

## 2021-01-11 DIAGNOSIS — R50.9 FEVER, UNSPECIFIED: ICD-10-CM

## 2021-01-11 DIAGNOSIS — Z98.890 OTHER SPECIFIED POSTPROCEDURAL STATES: Chronic | ICD-10-CM

## 2021-01-11 DIAGNOSIS — D64.9 ANEMIA, UNSPECIFIED: ICD-10-CM

## 2021-01-11 DIAGNOSIS — Z95.811 PRESENCE OF HEART ASSIST DEVICE: Chronic | ICD-10-CM

## 2021-01-11 DIAGNOSIS — I50.20 UNSPECIFIED SYSTOLIC (CONGESTIVE) HEART FAILURE: ICD-10-CM

## 2021-01-11 DIAGNOSIS — Z95.811 PRESENCE OF HEART ASSIST DEVICE: ICD-10-CM

## 2021-01-11 DIAGNOSIS — K59.00 CONSTIPATION, UNSPECIFIED: ICD-10-CM

## 2021-01-11 LAB
ALBUMIN SERPL ELPH-MCNC: 4.2 G/DL — SIGNIFICANT CHANGE UP (ref 3.3–5)
ALP SERPL-CCNC: 75 U/L — SIGNIFICANT CHANGE UP (ref 40–120)
ALT FLD-CCNC: 12 U/L — SIGNIFICANT CHANGE UP (ref 10–45)
ANION GAP SERPL CALC-SCNC: 11 MMOL/L — SIGNIFICANT CHANGE UP (ref 5–17)
APTT BLD: 35.2 SEC — SIGNIFICANT CHANGE UP (ref 27.5–35.5)
AST SERPL-CCNC: 35 U/L — SIGNIFICANT CHANGE UP (ref 10–40)
BASOPHILS # BLD AUTO: 0 K/UL — SIGNIFICANT CHANGE UP (ref 0–0.2)
BASOPHILS # BLD AUTO: 0.02 K/UL — SIGNIFICANT CHANGE UP (ref 0–0.2)
BASOPHILS NFR BLD AUTO: 0 % — SIGNIFICANT CHANGE UP (ref 0–2)
BASOPHILS NFR BLD AUTO: 0.6 % — SIGNIFICANT CHANGE UP (ref 0–2)
BILIRUB SERPL-MCNC: 0.4 MG/DL — SIGNIFICANT CHANGE UP (ref 0.2–1.2)
BLD GP AB SCN SERPL QL: NEGATIVE — SIGNIFICANT CHANGE UP
BUN SERPL-MCNC: 23 MG/DL — SIGNIFICANT CHANGE UP (ref 7–23)
CALCIUM SERPL-MCNC: 9.1 MG/DL — SIGNIFICANT CHANGE UP (ref 8.4–10.5)
CHLORIDE SERPL-SCNC: 96 MMOL/L — SIGNIFICANT CHANGE UP (ref 96–108)
CO2 SERPL-SCNC: 23 MMOL/L — SIGNIFICANT CHANGE UP (ref 22–31)
CREAT SERPL-MCNC: 1.43 MG/DL — HIGH (ref 0.5–1.3)
EOSINOPHIL # BLD AUTO: 0 K/UL — SIGNIFICANT CHANGE UP (ref 0–0.5)
EOSINOPHIL # BLD AUTO: 0 K/UL — SIGNIFICANT CHANGE UP (ref 0–0.5)
EOSINOPHIL NFR BLD AUTO: 0 % — SIGNIFICANT CHANGE UP (ref 0–6)
EOSINOPHIL NFR BLD AUTO: 0 % — SIGNIFICANT CHANGE UP (ref 0–6)
GAS PNL BLDV: SIGNIFICANT CHANGE UP
GLUCOSE SERPL-MCNC: 108 MG/DL — HIGH (ref 70–99)
HCT VFR BLD CALC: 22.3 % — LOW (ref 39–50)
HCT VFR BLD CALC: 23.6 % — LOW (ref 39–50)
HCT VFR BLD CALC: 27 % — LOW (ref 39–50)
HGB BLD-MCNC: 6.5 G/DL — CRITICAL LOW (ref 13–17)
HGB BLD-MCNC: 6.7 G/DL — CRITICAL LOW (ref 13–17)
HGB BLD-MCNC: 8.2 G/DL — LOW (ref 13–17)
IMM GRANULOCYTES NFR BLD AUTO: 0.6 % — SIGNIFICANT CHANGE UP (ref 0–1.5)
INR BLD: 2.98 RATIO — HIGH (ref 0.88–1.16)
LDH SERPL L TO P-CCNC: 374 U/L — HIGH (ref 50–242)
LYMPHOCYTES # BLD AUTO: 0.11 K/UL — LOW (ref 1–3.3)
LYMPHOCYTES # BLD AUTO: 0.21 K/UL — LOW (ref 1–3.3)
LYMPHOCYTES # BLD AUTO: 2.6 % — LOW (ref 13–44)
LYMPHOCYTES # BLD AUTO: 5.8 % — LOW (ref 13–44)
MCHC RBC-ENTMCNC: 22.1 PG — LOW (ref 27–34)
MCHC RBC-ENTMCNC: 22.6 PG — LOW (ref 27–34)
MCHC RBC-ENTMCNC: 23.6 PG — LOW (ref 27–34)
MCHC RBC-ENTMCNC: 28.4 GM/DL — LOW (ref 32–36)
MCHC RBC-ENTMCNC: 29.1 GM/DL — LOW (ref 32–36)
MCHC RBC-ENTMCNC: 30.4 GM/DL — LOW (ref 32–36)
MCV RBC AUTO: 77.4 FL — LOW (ref 80–100)
MCV RBC AUTO: 77.8 FL — LOW (ref 80–100)
MCV RBC AUTO: 77.9 FL — LOW (ref 80–100)
MONOCYTES # BLD AUTO: 0.27 K/UL — SIGNIFICANT CHANGE UP (ref 0–0.9)
MONOCYTES # BLD AUTO: 0.37 K/UL — SIGNIFICANT CHANGE UP (ref 0–0.9)
MONOCYTES NFR BLD AUTO: 7.5 % — SIGNIFICANT CHANGE UP (ref 2–14)
MONOCYTES NFR BLD AUTO: 8.8 % — SIGNIFICANT CHANGE UP (ref 2–14)
NEUTROPHILS # BLD AUTO: 3.08 K/UL — SIGNIFICANT CHANGE UP (ref 1.8–7.4)
NEUTROPHILS # BLD AUTO: 3.67 K/UL — SIGNIFICANT CHANGE UP (ref 1.8–7.4)
NEUTROPHILS NFR BLD AUTO: 85.5 % — HIGH (ref 43–77)
NEUTROPHILS NFR BLD AUTO: 86 % — HIGH (ref 43–77)
NRBC # BLD: 0 /100 WBCS — SIGNIFICANT CHANGE UP (ref 0–0)
NRBC # BLD: 0 /100 WBCS — SIGNIFICANT CHANGE UP (ref 0–0)
NT-PROBNP SERPL-SCNC: 315 PG/ML — HIGH (ref 0–300)
OB PNL STL: NEGATIVE — SIGNIFICANT CHANGE UP
PLATELET # BLD AUTO: 130 K/UL — LOW (ref 150–400)
PLATELET # BLD AUTO: 153 K/UL — SIGNIFICANT CHANGE UP (ref 150–400)
PLATELET # BLD AUTO: 154 K/UL — SIGNIFICANT CHANGE UP (ref 150–400)
POTASSIUM SERPL-MCNC: 4.3 MMOL/L — SIGNIFICANT CHANGE UP (ref 3.5–5.3)
POTASSIUM SERPL-SCNC: 4.3 MMOL/L — SIGNIFICANT CHANGE UP (ref 3.5–5.3)
PROT SERPL-MCNC: 7.6 G/DL — SIGNIFICANT CHANGE UP (ref 6–8.3)
PROTHROM AB SERPL-ACNC: 34 SEC — HIGH (ref 10.6–13.6)
RBC # BLD: 2.88 M/UL — LOW (ref 4.2–5.8)
RBC # BLD: 3.03 M/UL — LOW (ref 4.2–5.8)
RBC # BLD: 3.47 M/UL — LOW (ref 4.2–5.8)
RBC # FLD: 18 % — HIGH (ref 10.3–14.5)
RBC # FLD: 18.5 % — HIGH (ref 10.3–14.5)
RBC # FLD: 18.6 % — HIGH (ref 10.3–14.5)
RH IG SCN BLD-IMP: POSITIVE — SIGNIFICANT CHANGE UP
SARS-COV-2 RNA SPEC QL NAA+PROBE: SIGNIFICANT CHANGE UP
SODIUM SERPL-SCNC: 130 MMOL/L — LOW (ref 135–145)
WBC # BLD: 3.6 K/UL — LOW (ref 3.8–10.5)
WBC # BLD: 3.82 K/UL — SIGNIFICANT CHANGE UP (ref 3.8–10.5)
WBC # BLD: 4.18 K/UL — SIGNIFICANT CHANGE UP (ref 3.8–10.5)
WBC # FLD AUTO: 3.6 K/UL — LOW (ref 3.8–10.5)
WBC # FLD AUTO: 3.82 K/UL — SIGNIFICANT CHANGE UP (ref 3.8–10.5)
WBC # FLD AUTO: 4.18 K/UL — SIGNIFICANT CHANGE UP (ref 3.8–10.5)

## 2021-01-11 PROCEDURE — 71250 CT THORAX DX C-: CPT | Mod: 26

## 2021-01-11 PROCEDURE — 99285 EMERGENCY DEPT VISIT HI MDM: CPT

## 2021-01-11 PROCEDURE — 99223 1ST HOSP IP/OBS HIGH 75: CPT

## 2021-01-11 PROCEDURE — 99233 SBSQ HOSP IP/OBS HIGH 50: CPT | Mod: 25

## 2021-01-11 PROCEDURE — 74176 CT ABD & PELVIS W/O CONTRAST: CPT | Mod: 26

## 2021-01-11 PROCEDURE — 74019 RADEX ABDOMEN 2 VIEWS: CPT | Mod: 26

## 2021-01-11 PROCEDURE — 93750 INTERROGATION VAD IN PERSON: CPT

## 2021-01-11 RX ORDER — FERROUS SULFATE 325(65) MG
325 TABLET ORAL THREE TIMES A DAY
Refills: 0 | Status: DISCONTINUED | OUTPATIENT
Start: 2021-01-11 | End: 2021-01-12

## 2021-01-11 RX ORDER — AMIODARONE HYDROCHLORIDE 400 MG/1
200 TABLET ORAL DAILY
Refills: 0 | Status: DISCONTINUED | OUTPATIENT
Start: 2021-01-11 | End: 2021-01-20

## 2021-01-11 RX ORDER — MIRTAZAPINE 45 MG/1
7.5 TABLET, ORALLY DISINTEGRATING ORAL AT BEDTIME
Refills: 0 | Status: DISCONTINUED | OUTPATIENT
Start: 2021-01-11 | End: 2021-01-20

## 2021-01-11 RX ORDER — ACETAMINOPHEN 500 MG
650 TABLET ORAL ONCE
Refills: 0 | Status: COMPLETED | OUTPATIENT
Start: 2021-01-11 | End: 2021-01-11

## 2021-01-11 RX ORDER — PREGABALIN 225 MG/1
1000 CAPSULE ORAL DAILY
Refills: 0 | Status: DISCONTINUED | OUTPATIENT
Start: 2021-01-11 | End: 2021-01-20

## 2021-01-11 RX ORDER — PANTOPRAZOLE SODIUM 20 MG/1
40 TABLET, DELAYED RELEASE ORAL
Refills: 0 | Status: DISCONTINUED | OUTPATIENT
Start: 2021-01-11 | End: 2021-01-16

## 2021-01-11 RX ORDER — GABAPENTIN 400 MG/1
100 CAPSULE ORAL AT BEDTIME
Refills: 0 | Status: DISCONTINUED | OUTPATIENT
Start: 2021-01-11 | End: 2021-01-20

## 2021-01-11 RX ORDER — INFLUENZA VIRUS VACCINE 15; 15; 15; 15 UG/.5ML; UG/.5ML; UG/.5ML; UG/.5ML
0.5 SUSPENSION INTRAMUSCULAR ONCE
Refills: 0 | Status: DISCONTINUED | OUTPATIENT
Start: 2021-01-11 | End: 2021-01-20

## 2021-01-11 RX ORDER — FOLIC ACID 0.8 MG
1 TABLET ORAL DAILY
Refills: 0 | Status: DISCONTINUED | OUTPATIENT
Start: 2021-01-11 | End: 2021-01-20

## 2021-01-11 RX ORDER — SIMETHICONE 80 MG/1
80 TABLET, CHEWABLE ORAL THREE TIMES A DAY
Refills: 0 | Status: DISCONTINUED | OUTPATIENT
Start: 2021-01-11 | End: 2021-01-20

## 2021-01-11 RX ORDER — METOPROLOL TARTRATE 50 MG
25 TABLET ORAL DAILY
Refills: 0 | Status: DISCONTINUED | OUTPATIENT
Start: 2021-01-11 | End: 2021-01-20

## 2021-01-11 RX ORDER — SODIUM CHLORIDE 9 MG/ML
3 INJECTION INTRAMUSCULAR; INTRAVENOUS; SUBCUTANEOUS EVERY 8 HOURS
Refills: 0 | Status: DISCONTINUED | OUTPATIENT
Start: 2021-01-11 | End: 2021-01-20

## 2021-01-11 RX ORDER — FINASTERIDE 5 MG/1
5 TABLET, FILM COATED ORAL DAILY
Refills: 0 | Status: DISCONTINUED | OUTPATIENT
Start: 2021-01-11 | End: 2021-01-20

## 2021-01-11 RX ORDER — POLYETHYLENE GLYCOL 3350 17 G/17G
17 POWDER, FOR SOLUTION ORAL
Refills: 0 | Status: DISCONTINUED | OUTPATIENT
Start: 2021-01-11 | End: 2021-01-20

## 2021-01-11 RX ORDER — SPIRONOLACTONE 25 MG/1
25 TABLET, FILM COATED ORAL DAILY
Refills: 0 | Status: DISCONTINUED | OUTPATIENT
Start: 2021-01-11 | End: 2021-01-20

## 2021-01-11 RX ORDER — SODIUM CHLORIDE 9 MG/ML
1000 INJECTION INTRAMUSCULAR; INTRAVENOUS; SUBCUTANEOUS
Refills: 0 | Status: DISCONTINUED | OUTPATIENT
Start: 2021-01-11 | End: 2021-01-12

## 2021-01-11 RX ORDER — ACETAMINOPHEN 500 MG
650 TABLET ORAL EVERY 6 HOURS
Refills: 0 | Status: DISCONTINUED | OUTPATIENT
Start: 2021-01-11 | End: 2021-01-20

## 2021-01-11 RX ADMIN — SODIUM CHLORIDE 75 MILLILITER(S): 9 INJECTION INTRAMUSCULAR; INTRAVENOUS; SUBCUTANEOUS at 20:31

## 2021-01-11 RX ADMIN — SODIUM CHLORIDE 3 MILLILITER(S): 9 INJECTION INTRAMUSCULAR; INTRAVENOUS; SUBCUTANEOUS at 22:04

## 2021-01-11 RX ADMIN — Medication 650 MILLIGRAM(S): at 12:22

## 2021-01-11 RX ADMIN — Medication 1 MILLIGRAM(S): at 14:32

## 2021-01-11 RX ADMIN — SIMETHICONE 80 MILLIGRAM(S): 80 TABLET, CHEWABLE ORAL at 14:31

## 2021-01-11 RX ADMIN — PANTOPRAZOLE SODIUM 40 MILLIGRAM(S): 20 TABLET, DELAYED RELEASE ORAL at 14:31

## 2021-01-11 RX ADMIN — GABAPENTIN 100 MILLIGRAM(S): 400 CAPSULE ORAL at 21:59

## 2021-01-11 RX ADMIN — AMIODARONE HYDROCHLORIDE 200 MILLIGRAM(S): 400 TABLET ORAL at 16:31

## 2021-01-11 RX ADMIN — MIRTAZAPINE 7.5 MILLIGRAM(S): 45 TABLET, ORALLY DISINTEGRATING ORAL at 22:00

## 2021-01-11 RX ADMIN — Medication 325 MILLIGRAM(S): at 21:59

## 2021-01-11 RX ADMIN — FINASTERIDE 5 MILLIGRAM(S): 5 TABLET, FILM COATED ORAL at 14:32

## 2021-01-11 RX ADMIN — Medication 650 MILLIGRAM(S): at 21:59

## 2021-01-11 RX ADMIN — SIMETHICONE 80 MILLIGRAM(S): 80 TABLET, CHEWABLE ORAL at 22:00

## 2021-01-11 RX ADMIN — Medication 25 MILLIGRAM(S): at 16:31

## 2021-01-11 RX ADMIN — Medication 10 MILLIGRAM(S): at 16:31

## 2021-01-11 RX ADMIN — Medication 325 MILLIGRAM(S): at 14:31

## 2021-01-11 RX ADMIN — POLYETHYLENE GLYCOL 3350 17 GRAM(S): 17 POWDER, FOR SOLUTION ORAL at 18:21

## 2021-01-11 RX ADMIN — SPIRONOLACTONE 25 MILLIGRAM(S): 25 TABLET, FILM COATED ORAL at 16:31

## 2021-01-11 RX ADMIN — PREGABALIN 1000 MICROGRAM(S): 225 CAPSULE ORAL at 14:32

## 2021-01-11 RX ADMIN — Medication 100 MILLIGRAM(S): at 18:21

## 2021-01-11 NOTE — CONSULT NOTE ADULT - ASSESSMENT
Briefly, 64 y/o M w/ h/o stage D NICM/HFrEF s/p HM2 (as DT 9/17 d/t PAD), TV ring, PAD, prior COVID (4/20) who presents with abdominal pain x 4-5 days in setting of recent initiation of doxycycline for driveline drainage. Noted to have fever in ER. Appears hypovolemic on exam.

## 2021-01-11 NOTE — H&P ADULT - PROBLEM SELECTOR PLAN 3
- New onset of fever while in ER. Tmax 101  - Blood cultures sent  - Will consult ID, appreciate recommendations  - CT C/A/P pending

## 2021-01-11 NOTE — H&P ADULT - PROBLEM SELECTOR PLAN 1
- readmitted with H/H 6.5/22 in the setting of INR 2.89. Plan to transfuse with 1uPRBC and follow up with evening CBC  - Fecal Occult pending   - Will consult GI if needed

## 2021-01-11 NOTE — ED PROVIDER NOTE - PMH
ACC/AHA stage D systolic heart failure    Anticoagulation goal of INR 2.0 to 2.5    Bleeding hemorrhoids    BPH with urinary obstruction    CAD (coronary artery disease)    CHF (congestive heart failure)    CKD (chronic kidney disease), stage III    Claudication    Clavicle fracture    Depression    Falls    GI bleed    H/O epistaxis    Hemorrhoids    History of 2019 novel coronavirus disease (COVID-19)  april 2020  Iron deficiency anemia    Peripheral arterial disease    Pleural effusion    Vertigo

## 2021-01-11 NOTE — ED PROVIDER NOTE - PROGRESS NOTE DETAILS
patient reporting with creole  that he has been having pain in his abdomen and constipation since the time of starting the doxycycline on Friday. He reports that he 'can barely walk and feels like his head is cloudy since starting the medication.' Lengthy discussion with LVAD CLAUDIA Cobian, stating obtain labs and abdominal X-ray and will reassess. -Linh Rodriguez PA-C patient's hemoglobin is 6 with baseline of 10 in 9/2020. LDH is mildly elevated. Patient is febrile to 101 rectally. Will obtain blood cultures and provide tylenol. D/w Aranza who states that patient neds CT chest, abdomen and pelvis, VBG, repeat CBC now and guaiac. Will perform this work-up and admit to Dr. Cadet per Aranza. -Linh Rodriguez PA-C

## 2021-01-11 NOTE — ED ADULT NURSE NOTE - OBJECTIVE STATEMENT
64 y/o male patient presents ambulatory to ED with c/o intermittent abdominal pain with decreased PO intake x 3 days. Patient hx LVAD, started on doxycycline 3 days ago for "Driveline infection". Patient c/o left sided abd pain associated with weakness/dizziness and constipation. Patient advised to take miralax but has not started. Patient denies SOB and CP, N/V/D; afebrile in ED. Patient ambulatory with cane at baseline. 64 y/o male patient presents ambulatory to ED with c/o intermittent abdominal pain with decreased PO intake x 3 days. Patient hx LVAD, started on doxycycline 3 days ago for "Driveline infection". Patient c/o left sided abd pain associated with weakness/dizziness and constipation. Patient advised to take miralax but has not started. Patient denies SOB and CP, N/V/D; afebrile in ED. Patient ambulatory with cane at baseline. Patient English and Creole speaking

## 2021-01-11 NOTE — H&P ADULT - PROBLEM SELECTOR PLAN 5
- Continue with current speed (8800 rpm)  - Holding Coumadin and ASA at this time for recent bleed. On admission H/H 6.5/22  - Continue Doxycycline   - Continue to trend LDH levels daily

## 2021-01-11 NOTE — H&P ADULT - NSHPLABSRESULTS_GEN_ALL_CORE
Vitals:  Vital Signs Last 24 Hours  T(C): 38.8 (01-11-21 @ 11:33), Max: 38.8 (01-11-21 @ 11:33)  HR: 90 (01-11-21 @ 10:25) (58 - 90)  BP: 102/84 (01-11-21 @ 10:25) (98/65 - 102/84)  RR: 17 (01-11-21 @ 10:25) (17 - 17)  SpO2: 97% (01-11-21 @ 10:25) (97% - 98%)      Labs:                        6.5    3.60  )-----------( 154      ( 11 Jan 2021 12:08 )             22.3     01-11    130<L>  |  96  |  23  ----------------------------<  108<H>  4.3   |  23  |  1.43<H>    Ca    9.1      11 Jan 2021 10:34    TPro  7.6  /  Alb  4.2  /  TBili  0.4  /  DBili  x   /  AST  35  /  ALT  12  /  AlkPhos  75  01-11    PT/INR - ( 11 Jan 2021 10:34 )   PT: 34.0 sec;   INR: 2.98 ratio         PTT - ( 11 Jan 2021 10:34 )  PTT:35.2 sec      Serum Pro-Brain Natriuretic Peptide: 315 pg/mL (01-11 @ 10:34)      Lactate Dehydrogenase, Serum: 374 U/L (01-11 @ 10:34)          Imaging Studies

## 2021-01-11 NOTE — H&P ADULT - HISTORY OF PRESENT ILLNESS
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 coli UTIs and COVID-19 (back in April 2020). Overall patient has been progressing well. He was recently seen in clinic where he complained of abdominal pain and constipation. He was started on Doxycycline 1/7/21 due to driveline drainage and labs last week were unremarkable. He presents to [facility] ER stating that for the past 4 days that his abdominal pain has gotten worse, still not having bowel movements despite being on stool softeners and now having lower back pain. Todays labs show patient is anemic with H/H of 6.5/22, fecal occult pending. INR is 2.89. He is febrile with Tmax of 101, saturating on 99% room air, MAP in the 70s. He denies any chest pain, shortness of breath, dizziness, changes in urination or muscle aches.

## 2021-01-11 NOTE — CONSULT NOTE ADULT - SUBJECTIVE AND OBJECTIVE BOX
Patient is a 65y old  Male who presents with a chief complaint of fevers, anemia, abdominal pain and constipation (11 Jan 2021 12:48)    HPI:  Briefly, 64 y/o M w/ h/o stage D NICM/HFrEF s/p HM2 (as DT 9/17 d/t PAD), TV ring, PAD, prior COVID (4/20) who presents with abdominal pain x 4-5 days. Had been doing well but was seen last Thursday in clinic and noted to have driveline drainage so started on doxycycline. Had reported abdom pain with difficult constipatoin. Pain persisted and last BM was Thursday. Due to pain worsening, he presented to ED. Found to have fever 101, and labs notable for anemia (Hb 6.7 from 10). Denies any overt melena/BRBPR. Believes feels worse since doxycycline was added. Denies CP/SOB.     PAST MEDICAL & SURGICAL HISTORY:  GI bleed    Vertigo    H/O epistaxis    Iron deficiency anemia    CKD (chronic kidney disease), stage III    Clavicle fracture    Falls    Anticoagulation goal of INR 2.0 to 2.5    ACC/AHA stage D systolic heart failure    BPH with urinary obstruction    Claudication    Peripheral arterial disease    Bleeding hemorrhoids    Hemorrhoids    History of 2019 novel coronavirus disease (COVID-19)  april 2020    Pleural effusion    Depression    CAD (coronary artery disease)    CHF (congestive heart failure)    S/P endoscopy    S/P ventricular assist device    S/P TVR (tricuspid valve repair)        FAMILY HISTORY:      Home Medications:  acetaminophen 325 mg oral tablet: 2 tab(s) orally every 6 hours, As needed, Temp greater or equal to 38.5C (101.3F), Mild Pain (1 - 3) (18 Sep 2020 10:45)  Colace 100 mg oral capsule: 1 cap(s) orally 2 times a day (11 Jan 2021 13:10)  cyanocobalamin 1000 mcg oral tablet: 1 tab(s) orally once a day (18 Sep 2020 10:45)  doxycycline hyclate 100 mg oral tablet: 1 tab(s) orally 2 times a day (11 Jan 2021 13:09)  ferrous sulfate 325 mg (65 mg elemental iron) oral tablet: 1 tab(s) orally 3 times a day (18 Sep 2020 10:45)  folic acid 1 mg oral tablet: 1 tab(s) orally once a day (18 Sep 2020 10:45)  gabapentin 100 mg oral capsule: 1 cap(s) orally once a day (at bedtime) (18 Sep 2020 10:45)  polyethylene glycol 3350 oral powder for reconstitution: 17 gram(s) orally 2 times a day (18 Sep 2020 10:45)  simethicone 80 mg oral tablet, chewable: 1 tab(s) orally 3 times a day (18 Sep 2020 10:45)  warfarin 5 mg oral tablet: 1 tab(s) orally once a day (in the evening)  INR goal 2-2.5 (18 Sep 2020 10:45)      Medications:  acetaminophen   Tablet .. 650 milliGRAM(s) Oral every 6 hours PRN  aMIOdarone    Tablet 200 milliGRAM(s) Oral daily  cyanocobalamin 1000 MICROGram(s) Oral daily  doxycycline hyclate Capsule 100 milliGRAM(s) Oral every 12 hours  ferrous    sulfate 325 milliGRAM(s) Oral three times a day  finasteride 5 milliGRAM(s) Oral daily  folic acid 1 milliGRAM(s) Oral daily  gabapentin 100 milliGRAM(s) Oral at bedtime  influenza   Vaccine 0.5 milliLiter(s) IntraMuscular once  metoprolol succinate ER 25 milliGRAM(s) Oral daily  mirtazapine 7.5 milliGRAM(s) Oral at bedtime  pantoprazole    Tablet 40 milliGRAM(s) Oral before breakfast  polyethylene glycol 3350 17 Gram(s) Oral two times a day  simethicone 80 milliGRAM(s) Chew three times a day  sodium chloride 0.9% lock flush 3 milliLiter(s) IV Push every 8 hours  sodium chloride 0.9%. 1000 milliLiter(s) IV Continuous <Continuous>  spironolactone 25 milliGRAM(s) Oral daily      Review of systems:  10 point review of systems completed and are negative unless noted in HPI    Vitals:  T(C): 36.9 (01-11-21 @ 16:42), Max: 38.8 (01-11-21 @ 11:33)  HR: 91 (01-11-21 @ 16:42) (58 - 100)  BP: 108/77 (01-11-21 @ 16:42) (94/65 - 108/77)  BP(mean): 90 (01-11-21 @ 15:23) (90 - 90)  ABP: --  ABP(mean): --  RR: 18 (01-11-21 @ 16:42) (17 - 18)  SpO2: 96% (01-11-21 @ 16:42) (96% - 100%)    Daily Height in cm: 177.8 (11 Jan 2021 09:28)    Daily     Weight (kg): 90.7 (01-11 @ 09:28)    I&O's Summary      Physical Exam:  Appearance: No Acute Distress  HEENT: PERRL  Neck: No JVD  Cardiovascular: Normal S1 S2, No murmurs/rubs/gallops  Respiratory: Clear to auscultation bilaterally  Gastrointestinal: Soft, Non-tender	  Skin: No cyanosis	  Neurologic: Non-focal  Extremities: No LE edema  Psychiatry: A & O x 3, Mood & affect appropriate    LVAD interrogation:  Pump Speed: 8800  Pump Flow: 4.3  Pulsatility Index: 7.3  Pump Power: 4.9  Events: PI events  Driveline: clean, dry, intact  Programming changes: none    Labs:                        6.5    3.60  )-----------( 154      ( 11 Jan 2021 12:08 )             22.3     01-11    130<L>  |  96  |  23  ----------------------------<  108<H>  4.3   |  23  |  1.43<H>    Ca    9.1      11 Jan 2021 10:34    TPro  7.6  /  Alb  4.2  /  TBili  0.4  /  DBili  x   /  AST  35  /  ALT  12  /  AlkPhos  75  01-11    PT/INR - ( 11 Jan 2021 10:34 )   PT: 34.0 sec;   INR: 2.98 ratio         PTT - ( 11 Jan 2021 10:34 )  PTT:35.2 sec

## 2021-01-11 NOTE — CONSULT NOTE ADULT - PROBLEM SELECTOR RECOMMENDATION 9
c/w current speed   c/w doxycycline 100 mg q12  c/w toprol xl 25 mg daily and carlos 25 mg daily  prev on ASA and coumadin on hold d/t anemia

## 2021-01-11 NOTE — ED ADULT NURSE NOTE - NSIMPLEMENTINTERV_GEN_ALL_ED
Implemented All Fall Risk Interventions:  [place] to call system. Call bell, personal items and telephone within reach. Instruct patient to call for assistance. Room bathroom lighting operational. Non-slip footwear when patient is off stretcher. Physically safe environment: no spills, clutter or unnecessary equipment. Stretcher in lowest position, wheels locked, appropriate side rails in place. Provide visual cue, wrist band, yellow gown, etc. Monitor gait and stability. Monitor for mental status changes and reorient to person, place, and time. Review medications for side effects contributing to fall risk. Reinforce activity limits and safety measures with patient and family.

## 2021-01-11 NOTE — H&P ADULT - ASSESSMENT
65M PMH ACC/AHA stage D HF due to NICM HM2 LVAD with TV annuloplasty ring 9/12/17 as destination therapy due to severe peripheral artery disease with significant stenosis,  SIADH, Depression, CKD-3 with hyperkalemia, past E. coli UTIs and COVID-19 (back in April 2020). Patient is being readmitted for abodminal distention, constipation and anemia with H/H of 6.5/22 in the setting of his INR 2.89. Will require transfusion with 1uPRBC and GI consult. While in the ER was noted to be febrile (Tmax 101), cultures were collected. Will obtain CT C/A/P and plan to admit to [facility] for further management.      65M PMH ACC/AHA stage D HF due to NICM HM2 LVAD with TV annuloplasty ring 9/12/17 as destination therapy due to severe peripheral artery disease with significant stenosis,  SIADH, Depression, CKD-3 with hyperkalemia, past E. coli UTIs and COVID-19 (back in April 2020). Patient is being readmitted for abodminal distention, constipation and anemia with H/H of 6.5/22 in the setting of his INR 2.89. Will require transfusion with 1uPRBC and GI consult. While in the ER was noted to be febrile (Tmax 101), cultures were collected. Will obtain CT C/A/P and plan to admit to floor for further management.

## 2021-01-11 NOTE — H&P ADULT - NSHPPHYSICALEXAM_GEN_ALL_CORE
General: No distress. Comfortable.  HEENT: EOM intact.  Neck: Neck supple. JVP not elevated. No masses  Chest: Clear to auscultation bilaterally  CV: +VAD hum  Abdomen: Soft, distended, tenderness noted on the LLQ  Skin: No rashes or skin breakdown  Neurology: Alert and oriented times three. Sensation intact  Psych: Affect normal    LVAD Interrogation  VAD TYPE HM 2  Speed 8800  Flow 4.7  Power 4.7  PI 7.8  Assessment of driveline exit site: driveline dressing c/d/i  Programming changes: no changes made

## 2021-01-11 NOTE — ED PROVIDER NOTE - CLINICAL SUMMARY MEDICAL DECISION MAKING FREE TEXT BOX
Attn - pt with LVAD driveline infection and intermittent abdo pain since starting Doxycycline Friday.  Labs, Xray. LVAD consult - evaluated in ER (Aranza TAYLOR), reassess.

## 2021-01-11 NOTE — H&P ADULT - PROBLEM SELECTOR PLAN 2
- New onset of constipation. Was on Miralax and Senna as outpatient.   - Abdominal X-ray shows nonobstructive bowel gas pattern with Large stool burden noted throughout the colon.  - Will try aggressive bowel regimen

## 2021-01-11 NOTE — ED PROVIDER NOTE - PHYSICAL EXAMINATION
Attn - alert, NAD, no pallor or jaundice, PERRL 3 mm, moist mm, skin - warm and dry, Lungs - clear, no w/r/r, good BS bilaterally, Cor - rr, no M, no rub, Abdo - ND, soft, min tenderness epigastrium and left side, no HSM, no CVAT, no guarding or rebound. LVAD driveline in RUQ - nontender.  Back - tender lower lumbar and sacrum.  SLR - neg.  Extremities - no edma, no calf tenderness, distal pulses intact and symmetrical, Neuro - intact and non-focal

## 2021-01-12 LAB
24R-OH-CALCIDIOL SERPL-MCNC: 11 NG/ML — LOW (ref 30–80)
ALBUMIN SERPL ELPH-MCNC: 3.8 G/DL — SIGNIFICANT CHANGE UP (ref 3.3–5)
ALP SERPL-CCNC: 69 U/L — SIGNIFICANT CHANGE UP (ref 40–120)
ALT FLD-CCNC: 10 U/L — SIGNIFICANT CHANGE UP (ref 10–45)
ANION GAP SERPL CALC-SCNC: 11 MMOL/L — SIGNIFICANT CHANGE UP (ref 5–17)
APPEARANCE UR: CLEAR — SIGNIFICANT CHANGE UP
AST SERPL-CCNC: 44 U/L — HIGH (ref 10–40)
BACTERIA # UR AUTO: NEGATIVE — SIGNIFICANT CHANGE UP
BILIRUB SERPL-MCNC: 0.5 MG/DL — SIGNIFICANT CHANGE UP (ref 0.2–1.2)
BILIRUB UR-MCNC: NEGATIVE — SIGNIFICANT CHANGE UP
BUN SERPL-MCNC: 21 MG/DL — SIGNIFICANT CHANGE UP (ref 7–23)
CALCIUM SERPL-MCNC: 8.6 MG/DL — SIGNIFICANT CHANGE UP (ref 8.4–10.5)
CHLORIDE SERPL-SCNC: 99 MMOL/L — SIGNIFICANT CHANGE UP (ref 96–108)
CO2 SERPL-SCNC: 22 MMOL/L — SIGNIFICANT CHANGE UP (ref 22–31)
COLOR SPEC: YELLOW — SIGNIFICANT CHANGE UP
CREAT SERPL-MCNC: 1.15 MG/DL — SIGNIFICANT CHANGE UP (ref 0.5–1.3)
DIFF PNL FLD: NEGATIVE — SIGNIFICANT CHANGE UP
EPI CELLS # UR: 0 /HPF — SIGNIFICANT CHANGE UP
FERRITIN SERPL-MCNC: 197 NG/ML — SIGNIFICANT CHANGE UP (ref 30–400)
FOLATE SERPL-MCNC: >20 NG/ML — SIGNIFICANT CHANGE UP
GLUCOSE SERPL-MCNC: 99 MG/DL — SIGNIFICANT CHANGE UP (ref 70–99)
GLUCOSE UR QL: NEGATIVE — SIGNIFICANT CHANGE UP
HAPTOGLOB SERPL-MCNC: 87 MG/DL — SIGNIFICANT CHANGE UP (ref 34–200)
HCT VFR BLD CALC: 25.9 % — LOW (ref 39–50)
HGB BLD-MCNC: 8.1 G/DL — LOW (ref 13–17)
HYALINE CASTS # UR AUTO: 0 /LPF — SIGNIFICANT CHANGE UP (ref 0–2)
INR BLD: 3.09 RATIO — HIGH (ref 0.88–1.16)
IRON SATN MFR SERPL: 354 UG/DL — HIGH (ref 45–165)
IRON SATN MFR SERPL: SIGNIFICANT CHANGE UP % (ref 16–55)
KETONES UR-MCNC: ABNORMAL
LDH SERPL L TO P-CCNC: 424 U/L — HIGH (ref 50–242)
LEUKOCYTE ESTERASE UR-ACNC: NEGATIVE — SIGNIFICANT CHANGE UP
MCHC RBC-ENTMCNC: 24.3 PG — LOW (ref 27–34)
MCHC RBC-ENTMCNC: 31.3 GM/DL — LOW (ref 32–36)
MCV RBC AUTO: 77.5 FL — LOW (ref 80–100)
NITRITE UR-MCNC: NEGATIVE — SIGNIFICANT CHANGE UP
NRBC # BLD: 0 /100 WBCS — SIGNIFICANT CHANGE UP (ref 0–0)
PH UR: 6 — SIGNIFICANT CHANGE UP (ref 5–8)
PLATELET # BLD AUTO: 113 K/UL — LOW (ref 150–400)
POTASSIUM SERPL-MCNC: 4.2 MMOL/L — SIGNIFICANT CHANGE UP (ref 3.5–5.3)
POTASSIUM SERPL-SCNC: 4.2 MMOL/L — SIGNIFICANT CHANGE UP (ref 3.5–5.3)
PROT SERPL-MCNC: 7 G/DL — SIGNIFICANT CHANGE UP (ref 6–8.3)
PROT UR-MCNC: ABNORMAL
PROTHROM AB SERPL-ACNC: 35.1 SEC — HIGH (ref 10.6–13.6)
RBC # BLD: 3.34 M/UL — LOW (ref 4.2–5.8)
RBC # BLD: 3.44 M/UL — LOW (ref 4.2–5.8)
RBC # FLD: 18 % — HIGH (ref 10.3–14.5)
RBC CASTS # UR COMP ASSIST: 1 /HPF — SIGNIFICANT CHANGE UP (ref 0–4)
RETICS #: 37.2 K/UL — SIGNIFICANT CHANGE UP (ref 25–125)
RETICS/RBC NFR: 1.1 % — SIGNIFICANT CHANGE UP (ref 0.5–2.5)
SODIUM SERPL-SCNC: 132 MMOL/L — LOW (ref 135–145)
SP GR SPEC: 1.03 — HIGH (ref 1.01–1.02)
TIBC SERPL-MCNC: SIGNIFICANT CHANGE UP UG/DL (ref 220–430)
UIBC SERPL-MCNC: <20 UG/DL — LOW (ref 110–370)
UROBILINOGEN FLD QL: NEGATIVE — SIGNIFICANT CHANGE UP
WBC # BLD: 2.93 K/UL — LOW (ref 3.8–10.5)
WBC # FLD AUTO: 2.93 K/UL — LOW (ref 3.8–10.5)
WBC UR QL: 1 /HPF — SIGNIFICANT CHANGE UP (ref 0–5)

## 2021-01-12 PROCEDURE — 99232 SBSQ HOSP IP/OBS MODERATE 35: CPT

## 2021-01-12 PROCEDURE — 99233 SBSQ HOSP IP/OBS HIGH 50: CPT | Mod: 25

## 2021-01-12 PROCEDURE — 93750 INTERROGATION VAD IN PERSON: CPT

## 2021-01-12 PROCEDURE — 93010 ELECTROCARDIOGRAM REPORT: CPT

## 2021-01-12 RX ORDER — MULTIVIT WITH MIN/MFOLATE/K2 340-15/3 G
1 POWDER (GRAM) ORAL ONCE
Refills: 0 | Status: COMPLETED | OUTPATIENT
Start: 2021-01-12 | End: 2021-01-12

## 2021-01-12 RX ORDER — ASPIRIN/CALCIUM CARB/MAGNESIUM 324 MG
1 TABLET ORAL
Qty: 0 | Refills: 0 | DISCHARGE

## 2021-01-12 RX ADMIN — SODIUM CHLORIDE 75 MILLILITER(S): 9 INJECTION INTRAMUSCULAR; INTRAVENOUS; SUBCUTANEOUS at 04:54

## 2021-01-12 RX ADMIN — PANTOPRAZOLE SODIUM 40 MILLIGRAM(S): 20 TABLET, DELAYED RELEASE ORAL at 04:55

## 2021-01-12 RX ADMIN — SODIUM CHLORIDE 3 MILLILITER(S): 9 INJECTION INTRAMUSCULAR; INTRAVENOUS; SUBCUTANEOUS at 21:31

## 2021-01-12 RX ADMIN — Medication 30 MILLILITER(S): at 04:54

## 2021-01-12 RX ADMIN — PREGABALIN 1000 MICROGRAM(S): 225 CAPSULE ORAL at 13:08

## 2021-01-12 RX ADMIN — AMIODARONE HYDROCHLORIDE 200 MILLIGRAM(S): 400 TABLET ORAL at 04:55

## 2021-01-12 RX ADMIN — GABAPENTIN 100 MILLIGRAM(S): 400 CAPSULE ORAL at 21:32

## 2021-01-12 RX ADMIN — SIMETHICONE 80 MILLIGRAM(S): 80 TABLET, CHEWABLE ORAL at 04:55

## 2021-01-12 RX ADMIN — FINASTERIDE 5 MILLIGRAM(S): 5 TABLET, FILM COATED ORAL at 13:08

## 2021-01-12 RX ADMIN — Medication 1 BOTTLE: at 13:08

## 2021-01-12 RX ADMIN — Medication 25 MILLIGRAM(S): at 04:55

## 2021-01-12 RX ADMIN — SODIUM CHLORIDE 3 MILLILITER(S): 9 INJECTION INTRAMUSCULAR; INTRAVENOUS; SUBCUTANEOUS at 04:42

## 2021-01-12 RX ADMIN — Medication 1 MILLIGRAM(S): at 13:08

## 2021-01-12 RX ADMIN — MIRTAZAPINE 7.5 MILLIGRAM(S): 45 TABLET, ORALLY DISINTEGRATING ORAL at 21:32

## 2021-01-12 RX ADMIN — SODIUM CHLORIDE 3 MILLILITER(S): 9 INJECTION INTRAMUSCULAR; INTRAVENOUS; SUBCUTANEOUS at 13:02

## 2021-01-12 RX ADMIN — SIMETHICONE 80 MILLIGRAM(S): 80 TABLET, CHEWABLE ORAL at 21:32

## 2021-01-12 RX ADMIN — Medication 100 MILLIGRAM(S): at 04:55

## 2021-01-12 RX ADMIN — SIMETHICONE 80 MILLIGRAM(S): 80 TABLET, CHEWABLE ORAL at 13:08

## 2021-01-12 RX ADMIN — SPIRONOLACTONE 25 MILLIGRAM(S): 25 TABLET, FILM COATED ORAL at 04:55

## 2021-01-12 RX ADMIN — Medication 325 MILLIGRAM(S): at 04:55

## 2021-01-12 NOTE — CONSULT NOTE ADULT - SUBJECTIVE AND OBJECTIVE BOX
ID CONSULTATION-- Morris Prater [phone]    Patient is a 65y old  Male who presents with a chief complaint of fevers, anemia, abdominal pain and constipation (12 Jan 2021 13:59)    HPI:  65M PMH ACC/AHA stage D HF due to NICM HM2 LVAD , TV annuloplasty ring 9/12/17 as destination therapy due to severe peripheral artery disease with significant stenosis  SIADH, Depression, CKD-3 with hyperkalemia, past E. coli UTIs and COVID-19 (back in April 2020). Overall patient has been progressing well. He was recently seen in clinic where he complained of abdominal pain and constipation. He was started on Doxycycline 1/7/21 due to driveline drainage and labs last week were unremarkable. He presents to [facility] ER stating that for the past 4 days that his abdominal pain has gotten worse, still not having bowel movements despite being on stool softeners and now having lower back pain. Todays labs show patient is anemic with H/H of 6.5/22, fecal occult pending. INR is 2.89. He is febrile with Tmax of 101, saturating on 99% room air, MAP in the 70s. He denies any chest pain, shortness of breath, dizziness, changes in urination or muscle aches.      (11 Jan 2021 12:48)      PAST MEDICAL & SURGICAL HISTORY:  GI bleed    Vertigo    H/O epistaxis    Iron deficiency anemia    CKD (chronic kidney disease), stage III    Clavicle fracture    Falls    Anticoagulation goal of INR 2.0 to 2.5    ACC/AHA stage D systolic heart failure    BPH with urinary obstruction    Claudication    Peripheral arterial disease    Bleeding hemorrhoids    Hemorrhoids    History of 2019 novel coronavirus disease (COVID-19)  april 2020    Pleural effusion    Depression    CAD (coronary artery disease)    CHF (congestive heart failure)    S/P endoscopy    S/P ventricular assist device    S/P TVR (tricuspid valve repair)        SOCIAL:    FAMILY HISTORY:    REVIEW OF SYSTEMS  General:	Denies any malaise fatigue or chills. Fevers absent    Skin:No rash  	  Ophthalmologic:Denies any visual complaints,discharge redness or photophobia  	  ENMT:No nasal discharge,headache,sinus congestion or throat pain.No dental complaints    Respiratory and Thorax:No cough,sputum or chest pain.Denies shortness of breath  	  Cardiovascular:	No chest pain,palpitaions or dizziness    Gastrointestinal:	NO nausea,abdominal pain or diarrhea.    Genitourinary:	No dysuria,frequency. No flank pain    Musculoskeletal:	No joint swelling or pain.No weakness    Neurological:No confusion,diziness.No extremity weakness.No bladder or bowel incontinence	    Psychiatric:No delusions or hallucinations	    Hematology/Lymphatics:	No LN swelling.No gum bleeding     Endocrine:	No recent weight gain or loss.No abnormal heat/cold intolerance    Allergic/Immunologic:	No hives or rash   Allergies    No Known Allergies    Intolerances        ANTIMICROBIALS:        Vital Signs Last 24 Hrs  T(C): 36.6 (12 Jan 2021 11:57), Max: 38.4 (11 Jan 2021 20:45)  T(F): 97.9 (12 Jan 2021 11:57), Max: 101.1 (11 Jan 2021 20:45)  HR: 70 (12 Jan 2021 11:57) (70 - 97)  BP: 101/67 (12 Jan 2021 11:57) (101/67 - 101/67)  BP(mean): 86 (12 Jan 2021 17:00) (84 - 98)  RR: 18 (12 Jan 2021 11:57) (16 - 18)  SpO2: 99% (12 Jan 2021 11:57) (97% - 99%)    PHYSICAL EXAM:Pleasant patient in no acute distress.      Constitutional:Comfortable.Awake and alert  No cachexia     Eyes:PERRL EOMI.NO discharge or conjunctival injection    ENMT:No sinus tenderness.No thrush.No pharyngeal exudate or erythema.Fair dental hygiene    Neck:Supple,No LN,no JVD      Respiratory:Good air entry bilaterally,CTA    Cardiovascular: LVAD sounds/hum    Gastrointestinal: notes midepigastric discomfort, and distended on exam constipated    Genitourinary:No CVA tendereness     Rectal:    Extremities:No cyanosis,clubbing or edema.    Vascular:peripheral pulses felt    Neurological:AAO X 3,No grossly focal deficits    Skin:No rash     Lymph Nodes:No palpable LNs    Musculoskeletal:No joint swelling or LOM    Psychiatric:Affect normal.                              8.1    2.93  )-----------( 113      ( 12 Jan 2021 05:15 )             25.9       01-12    132<L>  |  99  |  21  ----------------------------<  99  4.2   |  22  |  1.15    Ca    8.6      12 Jan 2021 05:14    TPro  7.0  /  Alb  3.8  /  TBili  0.5  /  DBili  x   /  AST  44<H>  /  ALT  10  /  AlkPhos  69  01-12      RECENT CULTURES:  01-11 @ 16:36  .Blood Blood  --  --  --    No growth to date.  --      RADIOLOGY:    < from: CT Abdomen and Pelvis No Cont (01.11.21 @ 14:10) >    IMPRESSION:    No evidence of acute pathology in the chest or abdomen.    In regards to clinical questions, thereis no evidence of infection or hemorrhage.    < end of copied text >    IMPRESSION:    Rx:

## 2021-01-12 NOTE — PROGRESS NOTE ADULT - SUBJECTIVE AND OBJECTIVE BOX
HPI:  65M PMH ACC/AHA stage D HF due to NICM HM2 LVAD , TV annuloplasty ring 17 as destination therapy due to severe peripheral artery disease with significant stenosis  SIADH, Depression, CKD-3 with hyperkalemia, past E. coli UTIs and COVID-19 (back in 2020). Overall patient has been progressing well. He was recently seen in clinic where he complained of abdominal pain and constipation. He was started on Doxycycline 21 due to driveline drainage and labs last week were unremarkable. He presents to [facility] ER stating that for the past 4 days that his abdominal pain has gotten worse, still not having bowel movements despite being on stool softeners and now having lower back pain. Todays labs show patient is anemic with H/H of 6.5/22, fecal occult pending. INR is 2.89. He is febrile with Tmax of 101, saturating on 99% room air, MAP in the 70s. He denies any chest pain, shortness of breath, dizziness, changes in urination or muscle aches.      (2021 12:48)      MEDICATIONS  (STANDING):  aMIOdarone    Tablet 200 milliGRAM(s) Oral daily  cyanocobalamin 1000 MICROGram(s) Oral daily  finasteride 5 milliGRAM(s) Oral daily  folic acid 1 milliGRAM(s) Oral daily  gabapentin 100 milliGRAM(s) Oral at bedtime  influenza   Vaccine 0.5 milliLiter(s) IntraMuscular once  metoprolol succinate ER 25 milliGRAM(s) Oral daily  mirtazapine 7.5 milliGRAM(s) Oral at bedtime  pantoprazole    Tablet 40 milliGRAM(s) Oral before breakfast  polyethylene glycol 3350 17 Gram(s) Oral two times a day  simethicone 80 milliGRAM(s) Chew three times a day  sodium chloride 0.9% lock flush 3 milliLiter(s) IV Push every 8 hours  sodium chloride 0.9%. 1000 milliLiter(s) (75 mL/Hr) IV Continuous <Continuous>  spironolactone 25 milliGRAM(s) Oral daily    MEDICATIONS  (PRN):  acetaminophen   Tablet .. 650 milliGRAM(s) Oral every 6 hours PRN Temp greater or equal to 38C (100.4F), Mild Pain (1 - 3)      I&O's Summary    2021 07:01  -  2021 07:00  --------------------------------------------------------  IN: 0 mL / OUT: 400 mL / NET: -400 mL    2021 07:01  -  2021 13:59  --------------------------------------------------------  IN: 0 mL / OUT: 450 mL / NET: -450 mL                              8.1    2.93  )-----------( 113      ( 2021 05:15 )             25.9           132<L>  |  99  |  21  ----------------------------<  99  4.2   |  22  |  1.15    Ca    8.6      2021 05:14    TPro  7.0  /  Alb  3.8  /  TBili  0.5  /  DBili  x   /  AST  44<H>  /  ALT  10  /  AlkPhos  69      Physical Exam:     General: No distress. Comfortable.    HEENT: EOM intact.    Neck: Neck supple. JVP not elevated. No masses    Chest: Clear to auscultation bilaterally    CV: +VAD hum    Abdomen: Soft, non-distended, non-tender, +BS    Skin: No rashes or skin breakdown    Neurology: Alert and oriented times three. Sensation intact    Psych: Affect normal      LVAD Interrogation  VAD TYPE HM 2  Speed 8800  Flow 4.4  Power 4.9  PI  7.1  Assessment of driveline exit site: driveline dressing c/d/i      No programming changes were made   I&O's Summary    2021 07:01  -  2021 07:00  --------------------------------------------------------  IN: 0 mL / OUT: 400 mL / NET: -400 mL    2021 07:01  -  2021 13:59  --------------------------------------------------------  IN: 0 mL / OUT: 450 mL / NET: -450 mL      Daily     Daily Weight in k (2021 10:48)

## 2021-01-12 NOTE — PROGRESS NOTE ADULT - PROBLEM SELECTOR PLAN 5
- New onset of constipation. Was on Miralax and Senna as outpatient.   - Abdominal X-ray shows nonobstructive bowel gas pattern with Large stool burden noted throughout the colon.  - Given Mag Citrate today

## 2021-01-12 NOTE — PROGRESS NOTE ADULT - PROBLEM SELECTOR PLAN 3
- Continue with current speed (8800 rpm)  - Holding Coumadin and ASA at this time for low H/H on admission  - Continue to trend LDH levels daily.

## 2021-01-12 NOTE — PROGRESS NOTE ADULT - PROBLEM SELECTOR PLAN 2
CT no source infection / bleeding  Iron panel pending  S/P PRBC 1/11  Hct stable 25 w/o evidence bleeding

## 2021-01-12 NOTE — PROGRESS NOTE ADULT - SUBJECTIVE AND OBJECTIVE BOX
Subjective: Patient seen and examined resting in bed. ON was transfused with 1uPRBC and responded appropriately     Medications:  acetaminophen   Tablet .. 650 milliGRAM(s) Oral every 6 hours PRN  aMIOdarone    Tablet 200 milliGRAM(s) Oral daily  cyanocobalamin 1000 MICROGram(s) Oral daily  ferrous    sulfate 325 milliGRAM(s) Oral three times a day  finasteride 5 milliGRAM(s) Oral daily  folic acid 1 milliGRAM(s) Oral daily  gabapentin 100 milliGRAM(s) Oral at bedtime  influenza   Vaccine 0.5 milliLiter(s) IntraMuscular once  metoprolol succinate ER 25 milliGRAM(s) Oral daily  mirtazapine 7.5 milliGRAM(s) Oral at bedtime  pantoprazole    Tablet 40 milliGRAM(s) Oral before breakfast  polyethylene glycol 3350 17 Gram(s) Oral two times a day  simethicone 80 milliGRAM(s) Chew three times a day  sodium chloride 0.9% lock flush 3 milliLiter(s) IV Push every 8 hours  sodium chloride 0.9%. 1000 milliLiter(s) IV Continuous <Continuous>  spironolactone 25 milliGRAM(s) Oral daily      Vitals:  Vital Signs Last 24 Hours  T(C): 36.6 (01-12-21 @ 04:49), Max: 38.8 (01-11-21 @ 11:33)  HR: 83 (01-12-21 @ 04:49) (58 - 100)  BP: 108/77 (01-11-21 @ 16:42) (94/65 - 108/77)  RR: 16 (01-12-21 @ 04:49) (16 - 18)  SpO2: 98% (01-12-21 @ 04:49) (96% - 100%)        I&O's Summary    11 Jan 2021 07:01  -  12 Jan 2021 07:00  --------------------------------------------------------  IN: 0 mL / OUT: 400 mL / NET: -400 mL    12 Jan 2021 07:01  -  12 Jan 2021 08:56  --------------------------------------------------------  IN: 0 mL / OUT: 250 mL / NET: -250 mL        Physical Exam  General: No distress. Comfortable.  HEENT: EOM intact.  Neck: Neck supple. JVP not elevated. No masses  Chest: Clear to auscultation bilaterally  CV: +VAD hum  Abdomen: Soft, non-distended, non-tender, +BS  Skin: No rashes or skin breakdown  Neurology: Alert and oriented times three. Sensation intact  Psych: Affect normal    LVAD Interrogation  VAD TYPE HM 2  Speed 8800  Flow 4.4  Power 4.9  PI  7.1  Assessment of driveline exit site: driveline dressing c/d/i  Programming changes: no changes made    Labs:                        8.1    2.93  )-----------( 113      ( 12 Jan 2021 05:15 )             25.9     01-12    132<L>  |  99  |  21  ----------------------------<  99  4.2   |  22  |  1.15    Ca    8.6      12 Jan 2021 05:14    TPro  7.0  /  Alb  3.8  /  TBili  0.5  /  DBili  x   /  AST  44<H>  /  ALT  10  /  AlkPhos  69  01-12    PT/INR - ( 12 Jan 2021 05:15 )   PT: 35.1 sec;   INR: 3.09 ratio         PTT - ( 11 Jan 2021 10:34 )  PTT:35.2 sec      Serum Pro-Brain Natriuretic Peptide: 315 pg/mL (01-11 @ 10:34)      Lactate Dehydrogenase, Serum: 424 U/L (01-12 @ 05:14)  Lactate Dehydrogenase, Serum: 374 U/L (01-11 @ 10:34)

## 2021-01-12 NOTE — PROGRESS NOTE ADULT - ASSESSMENT
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 coli UTIs and COVID-19 (back in April 2020). Overall patient has been progressing well. He was recently seen in clinic where he complained of abdominal pain and constipation. He was started on Doxycycline 1/7/21 due to driveline drainage and labs last week were unremarkable. He presents to [facility] ER stating that for the past 4 days that his abdominal pain has gotten worse, still not having bowel movements despite being on stool softeners and now having lower back pain. Todays labs show patient is anemic with H/H of 6.5/22, fecal occult pending. INR is 2.89. He is febrile with Tmax of 101, saturating on 99% room air, MAP in the 70s. He denies any chest pain, shortness of breath, dizziness, changes in urination or muscle aches.   1/12 INR 3.0  Followed by heart failure team  NPO until GI bleed source ruled out> CT neg

## 2021-01-12 NOTE — PHARMACOTHERAPY INTERVENTION NOTE - COMMENTS
Confirmed home medications with [employer] record, pharmacy, and patient. Updated in Outpatient Medication Review.     Yohana Manzanares PharmD, BCPS  [phone]

## 2021-01-12 NOTE — PROGRESS NOTE ADULT - ASSESSMENT
64 y/o M w/ h/o stage D NICM/HFrEF s/p HM2 (as DT 9/17 d/t PAD), TV ring, PAD, prior COVID (4/20) who was readmitted with abdominal pain x 4-5 days in setting of constipation as well as recent initiation of doxycycline for driveline drainage. Labs showed patient to be anemic with H/H of 6.5/22 in setting of INR 2.89. He was transfused with 1uPRBC on 1/11 and responded appropriately. Continues to have febrile episodes (last on 1/12 with Tmax of 101.1). Blood cultures remains NGTD and UA is negative. Was given aggressive bowel regimen today in hopes for resolution of constipation. Hopefully that abdominal pain will resolve once patient has bowel movement.

## 2021-01-12 NOTE — PROGRESS NOTE ADULT - PROBLEM SELECTOR PLAN 4
- Was febrile overnight with Tmax of 101.1  - ID consulted, appreciate recommendations  - Blood cultures NGTD  - UA negative  - CT C/A/P 1/11 shows no evidence of acute pathology in chest of abdomen

## 2021-01-12 NOTE — PROGRESS NOTE ADULT - PROBLEM SELECTOR PLAN 1
- readmitted with H/H 6.5/22 in the setting of INR 2.89. Was transfused with 1uPRBC on 1/11and responded appropriately   - Fecal Occult negative  - D/c Iron supplements today  - Reticulocyte, Haptoglobin studies pending

## 2021-01-12 NOTE — CONSULT NOTE ADULT - ASSESSMENT
64 y/o M w/ h/o stage D NICM/HFrEF s/p HM2 (as DT 9/17 d/t PAD), TV ring, PAD, prior COVID (4/20) who was readmitted with abdominal pain x 4-5 days in setting of constipation as well as recent initiation of doxycycline for driveline drainage. Labs showed patient to be anemic with H/H of 6.5/22 in setting of INR 2.89. He was transfused with 1uPRBC on 1/11 and responded appropriately. Continues to have febrile episodes (last on 1/12 with Tmax of 101.1). Blood cultures remains NGTD and UA is negative. Was given aggressive bowel regimen today in hopes for resolution of constipation. Hopefully that abdominal pain will resolve once patient has bowel movement.       Assessment- 66 yo man with HM-2 admitted with fevers and mid epigastric abdominal pain  CT of abdomen relatively so unclear if he passed a gall stone or kidney stone or other etiology    Would:  send blood cultures x2 sets  check quantiferon  check urine histo ag.  repeat UA and culture- in case he passed a renal calculus  COVID IGG ab  strongyloides ab    Low threshold to start antibiotics Cefepime 1 gram IV q 8hr       Morris Prater MD  [phone]  After 5pm/weekends [phone]

## 2021-01-13 LAB
ALBUMIN SERPL ELPH-MCNC: 3.6 G/DL — SIGNIFICANT CHANGE UP (ref 3.3–5)
ALP SERPL-CCNC: 74 U/L — SIGNIFICANT CHANGE UP (ref 40–120)
ALT FLD-CCNC: 23 U/L — SIGNIFICANT CHANGE UP (ref 10–45)
ANION GAP SERPL CALC-SCNC: 10 MMOL/L — SIGNIFICANT CHANGE UP (ref 5–17)
AST SERPL-CCNC: 48 U/L — HIGH (ref 10–40)
BACTERIA WND CULT: NORMAL
BILIRUB SERPL-MCNC: 0.3 MG/DL — SIGNIFICANT CHANGE UP (ref 0.2–1.2)
BUN SERPL-MCNC: 20 MG/DL — SIGNIFICANT CHANGE UP (ref 7–23)
CALCIUM SERPL-MCNC: 8.6 MG/DL — SIGNIFICANT CHANGE UP (ref 8.4–10.5)
CHLORIDE SERPL-SCNC: 101 MMOL/L — SIGNIFICANT CHANGE UP (ref 96–108)
CO2 SERPL-SCNC: 22 MMOL/L — SIGNIFICANT CHANGE UP (ref 22–31)
CREAT SERPL-MCNC: 1.1 MG/DL — SIGNIFICANT CHANGE UP (ref 0.5–1.3)
DAT POLY-SP REAG RBC QL: NEGATIVE — SIGNIFICANT CHANGE UP
GLUCOSE SERPL-MCNC: 88 MG/DL — SIGNIFICANT CHANGE UP (ref 70–99)
HCT VFR BLD CALC: 29 % — LOW (ref 39–50)
HGB BLD-MCNC: 8.7 G/DL — LOW (ref 13–17)
INR BLD: 2.5 RATIO — HIGH (ref 0.88–1.16)
LDH SERPL L TO P-CCNC: 439 U/L — HIGH (ref 50–242)
MCHC RBC-ENTMCNC: 23.7 PG — LOW (ref 27–34)
MCHC RBC-ENTMCNC: 30 GM/DL — LOW (ref 32–36)
MCV RBC AUTO: 79 FL — LOW (ref 80–100)
NRBC # BLD: 0 /100 WBCS — SIGNIFICANT CHANGE UP (ref 0–0)
PLATELET # BLD AUTO: 88 K/UL — LOW (ref 150–400)
POTASSIUM SERPL-MCNC: 4.2 MMOL/L — SIGNIFICANT CHANGE UP (ref 3.5–5.3)
POTASSIUM SERPL-SCNC: 4.2 MMOL/L — SIGNIFICANT CHANGE UP (ref 3.5–5.3)
PROT SERPL-MCNC: 7.6 G/DL — SIGNIFICANT CHANGE UP (ref 6–8.3)
PROTHROM AB SERPL-ACNC: 28.7 SEC — HIGH (ref 10.6–13.6)
RBC # BLD: 3.37 M/UL — LOW (ref 4.2–5.8)
RBC # BLD: 3.67 M/UL — LOW (ref 4.2–5.8)
RBC # FLD: 17.8 % — HIGH (ref 10.3–14.5)
RETICS #: 16.2 K/UL — LOW (ref 25–125)
RETICS/RBC NFR: 0.5 % — SIGNIFICANT CHANGE UP (ref 0.5–2.5)
SODIUM SERPL-SCNC: 133 MMOL/L — LOW (ref 135–145)
WBC # BLD: 2.58 K/UL — LOW (ref 3.8–10.5)
WBC # FLD AUTO: 2.58 K/UL — LOW (ref 3.8–10.5)

## 2021-01-13 PROCEDURE — 99233 SBSQ HOSP IP/OBS HIGH 50: CPT | Mod: 25

## 2021-01-13 PROCEDURE — 99232 SBSQ HOSP IP/OBS MODERATE 35: CPT

## 2021-01-13 PROCEDURE — 93750 INTERROGATION VAD IN PERSON: CPT

## 2021-01-13 PROCEDURE — 93010 ELECTROCARDIOGRAM REPORT: CPT

## 2021-01-13 RX ORDER — WARFARIN SODIUM 2.5 MG/1
1 TABLET ORAL ONCE
Refills: 0 | Status: COMPLETED | OUTPATIENT
Start: 2021-01-13 | End: 2021-01-13

## 2021-01-13 RX ORDER — ACETAMINOPHEN 500 MG
650 TABLET ORAL ONCE
Refills: 0 | Status: COMPLETED | OUTPATIENT
Start: 2021-01-13 | End: 2021-01-13

## 2021-01-13 RX ADMIN — PANTOPRAZOLE SODIUM 40 MILLIGRAM(S): 20 TABLET, DELAYED RELEASE ORAL at 05:43

## 2021-01-13 RX ADMIN — Medication 25 MILLIGRAM(S): at 05:43

## 2021-01-13 RX ADMIN — WARFARIN SODIUM 1 MILLIGRAM(S): 2.5 TABLET ORAL at 21:48

## 2021-01-13 RX ADMIN — SODIUM CHLORIDE 3 MILLILITER(S): 9 INJECTION INTRAMUSCULAR; INTRAVENOUS; SUBCUTANEOUS at 11:57

## 2021-01-13 RX ADMIN — FINASTERIDE 5 MILLIGRAM(S): 5 TABLET, FILM COATED ORAL at 12:01

## 2021-01-13 RX ADMIN — Medication 650 MILLIGRAM(S): at 08:53

## 2021-01-13 RX ADMIN — AMIODARONE HYDROCHLORIDE 200 MILLIGRAM(S): 400 TABLET ORAL at 05:43

## 2021-01-13 RX ADMIN — SIMETHICONE 80 MILLIGRAM(S): 80 TABLET, CHEWABLE ORAL at 12:00

## 2021-01-13 RX ADMIN — SODIUM CHLORIDE 3 MILLILITER(S): 9 INJECTION INTRAMUSCULAR; INTRAVENOUS; SUBCUTANEOUS at 05:29

## 2021-01-13 RX ADMIN — PREGABALIN 1000 MICROGRAM(S): 225 CAPSULE ORAL at 12:01

## 2021-01-13 RX ADMIN — MIRTAZAPINE 7.5 MILLIGRAM(S): 45 TABLET, ORALLY DISINTEGRATING ORAL at 21:48

## 2021-01-13 RX ADMIN — SODIUM CHLORIDE 3 MILLILITER(S): 9 INJECTION INTRAMUSCULAR; INTRAVENOUS; SUBCUTANEOUS at 21:37

## 2021-01-13 RX ADMIN — SIMETHICONE 80 MILLIGRAM(S): 80 TABLET, CHEWABLE ORAL at 05:43

## 2021-01-13 RX ADMIN — SIMETHICONE 80 MILLIGRAM(S): 80 TABLET, CHEWABLE ORAL at 21:48

## 2021-01-13 RX ADMIN — Medication 1 MILLIGRAM(S): at 12:00

## 2021-01-13 RX ADMIN — SPIRONOLACTONE 25 MILLIGRAM(S): 25 TABLET, FILM COATED ORAL at 05:43

## 2021-01-13 RX ADMIN — GABAPENTIN 100 MILLIGRAM(S): 400 CAPSULE ORAL at 21:48

## 2021-01-13 RX ADMIN — POLYETHYLENE GLYCOL 3350 17 GRAM(S): 17 POWDER, FOR SOLUTION ORAL at 05:44

## 2021-01-13 NOTE — PROGRESS NOTE ADULT - PROBLEM SELECTOR PLAN 2
CT no source infection / bleeding  Iron panel pending  S/P PRBC 1/11  Hct stable 25 w/o evidence bleeding CT no source infection / bleeding  Iron panel- Ferriten/ folate levels nl; TIBC night 354   S/P PRBC 1/11  daily cbc- stable today 8/29   monitor VS  hold anticoagulation as per CHF team- INR 2.5- coumadin on hold CT no source infection / bleeding  Iron panel- Ferriten/ folate levels nl; TIBC night 354   S/P PRBC 1/11  daily cbc- stable today 8/29   monitor VS  resume coumadin 1 mg this evening as per LVAD team  heme consulted for pancytopenia

## 2021-01-13 NOTE — CONSULT NOTE ADULT - SUBJECTIVE AND OBJECTIVE BOX
HPI:  65M PMH ACC/AHA stage D HF due to NICM HM2 LVAD , TV annuloplasty ring 9/12/17 as destination therapy due to severe peripheral artery disease with significant stenosis  SIADH, Depression, CKD-3 with hyperkalemia, past E. coli UTIs and COVID-19 (back in April 2020). Overall patient has been progressing well. He was recently seen in clinic where he complained of abdominal pain and constipation. He was started on Doxycycline 1/7/21 due to driveline drainage and labs last week were unremarkable. He presents to [facility] ER stating that for the past 4 days that his abdominal pain has gotten worse, still not having bowel movements despite being on stool softeners and now having lower back pain. Todays labs show patient is anemic with H/H of 6.5/22, fecal occult pending. INR is 2.89. He is febrile with Tmax of 101, saturating on 99% room air, MAP in the 70s. He denies any chest pain, shortness of breath, dizziness, changes in urination or muscle aches.     Hematology has been consulted for evaluation of patient's pancytopenia, which has been getting progressively worse during admission.       PAST MEDICAL & SURGICAL HISTORY:  GI bleed    Vertigo    H/O epistaxis    Iron deficiency anemia    CKD (chronic kidney disease), stage III    Clavicle fracture    Falls    Anticoagulation goal of INR 2.0 to 2.5    ACC/AHA stage D systolic heart failure    BPH with urinary obstruction    Claudication    Peripheral arterial disease    Bleeding hemorrhoids    Hemorrhoids    History of 2019 novel coronavirus disease (COVID-19)  april 2020    Pleural effusion    Depression    CAD (coronary artery disease)    CHF (congestive heart failure)    S/P endoscopy    S/P ventricular assist device    S/P TVR (tricuspid valve repair)        Allergies    No Known Allergies    Intolerances        MEDICATIONS  (STANDING):  aMIOdarone    Tablet 200 milliGRAM(s) Oral daily  cyanocobalamin 1000 MICROGram(s) Oral daily  finasteride 5 milliGRAM(s) Oral daily  folic acid 1 milliGRAM(s) Oral daily  gabapentin 100 milliGRAM(s) Oral at bedtime  influenza   Vaccine 0.5 milliLiter(s) IntraMuscular once  metoprolol succinate ER 25 milliGRAM(s) Oral daily  mirtazapine 7.5 milliGRAM(s) Oral at bedtime  pantoprazole    Tablet 40 milliGRAM(s) Oral before breakfast  polyethylene glycol 3350 17 Gram(s) Oral two times a day  simethicone 80 milliGRAM(s) Chew three times a day  sodium chloride 0.9% lock flush 3 milliLiter(s) IV Push every 8 hours  spironolactone 25 milliGRAM(s) Oral daily    MEDICATIONS  (PRN):  acetaminophen   Tablet .. 650 milliGRAM(s) Oral every 6 hours PRN Temp greater or equal to 38C (100.4F), Mild Pain (1 - 3)      FAMILY HISTORY:      SOCIAL HISTORY: No EtOH, no tobacco    REVIEW OF SYSTEMS:  12 point review of systems is negative unless indicated above.        T(F): 97.3 (01-13-21 @ 11:05), Max: 98.4 (01-13-21 @ 04:50)  HR: 82 (01-13-21 @ 11:05)  BP: --  RR: 16 (01-13-21 @ 11:05)  SpO2: 98% (01-13-21 @ 11:05)  Wt(kg): --    GENERAL: NAD, well-developed  HEAD:  Atraumatic, Normocephalic  EYES: EOMI, PERRLA, conjunctiva and sclera clear  NECK: Supple, No JVD  CHEST/LUNG: Clear to auscultation bilaterally; No wheeze  HEART: Regular rate and rhythm; No rubs, or gallops. +LVAD hum  ABDOMEN: Soft, Nontender, Nondistended; Bowel sounds present  EXTREMITIES:  2+ Peripheral Pulses, No clubbing, cyanosis, or edema  NEUROLOGY: non-focal  SKIN: No rashes or lesions                          8.7    2.58  )-----------( 88       ( 13 Jan 2021 05:52 )             29.0       01-13    133<L>  |  101  |  20  ----------------------------<  88  4.2   |  22  |  1.10    Ca    8.6      13 Jan 2021 05:52    TPro  7.6  /  Alb  3.6  /  TBili  0.3  /  DBili  x   /  AST  48<H>  /  ALT  23  /  AlkPhos  74  01-13      Lactate Dehydrogenase, Serum: 439 U/L (01-13 @ 05:52)      PT/INR - ( 13 Jan 2021 05:52 )   PT: 28.7 sec;   INR: 2.50 ratio             .Blood Blood  01-11 @ 16:36   No growth to date.  --  --

## 2021-01-13 NOTE — PROGRESS NOTE ADULT - SUBJECTIVE AND OBJECTIVE BOX
Interval History:  had BMs with some improvement in abdom pain  denies further fever    Medications:  acetaminophen   Tablet .. 650 milliGRAM(s) Oral every 6 hours PRN  aMIOdarone    Tablet 200 milliGRAM(s) Oral daily  cyanocobalamin 1000 MICROGram(s) Oral daily  finasteride 5 milliGRAM(s) Oral daily  folic acid 1 milliGRAM(s) Oral daily  gabapentin 100 milliGRAM(s) Oral at bedtime  influenza   Vaccine 0.5 milliLiter(s) IntraMuscular once  metoprolol succinate ER 25 milliGRAM(s) Oral daily  mirtazapine 7.5 milliGRAM(s) Oral at bedtime  pantoprazole    Tablet 40 milliGRAM(s) Oral before breakfast  polyethylene glycol 3350 17 Gram(s) Oral two times a day  simethicone 80 milliGRAM(s) Chew three times a day  sodium chloride 0.9% lock flush 3 milliLiter(s) IV Push every 8 hours  spironolactone 25 milliGRAM(s) Oral daily      Vitals:  T(C): 36.2 (21 @ 20:25), Max: 36.9 (21 @ 04:50)  HR: 74 (21 @ 21:27) (70 - 87)  BP: 97/64 (21 @ 21:27) (97/64 - 97/64)  BP(mean): 82 (21 @ 21:39) (80 - 98)  ABP: --  ABP(mean): --  RR: 18 (21 @ 20:25) (16 - 18)  SpO2: 99% (21 @ 20:25) (98% - 100%)    Daily     Daily Weight in k.6 (2021 08:47)    Weight (kg): 74 ( @ 10:48)    I&O's Summary    2021 07:01  -  2021 07:00  --------------------------------------------------------  IN: 0 mL / OUT: 810 mL / NET: -810 mL    2021 07:01  -  2021 22:56  --------------------------------------------------------  IN: 0 mL / OUT: 150 mL / NET: -150 mL    Physical Exam:  Appearance: No Acute Distress  HEENT: PERRL  Neck: No JVD  Cardiovascular: Normal S1 S2, No murmurs/rubs/gallops; VAD hum  Respiratory: Clear to auscultation bilaterally  Gastrointestinal: Soft, Non-tender	  Skin: No cyanosis	  Neurologic: Non-focal  Extremities: No LE edema  Psychiatry: A & O x 3, Mood & affect appropriate    LVAD Interrogation:  Pump Speed: 8800  Pump Flow: 4.9  Pulse Index: 6.5  Pump Power: 5  VAD Events: no events  Driveline evaluation: c/d/i  Programming Changes: No changes made    Labs:                        8.7    2.58  )-----------( 88       ( 2021 05:52 )             29.0         133<L>  |  101  |  20  ----------------------------<  88  4.2   |  22  |  1.10    Ca    8.6      2021 05:52    TPro  7.6  /  Alb  3.6  /  TBili  0.3  /  DBili  x   /  AST  48<H>  /  ALT  23  /  AlkPhos  74  -13    PT/INR - ( 2021 05:52 )   PT: 28.7 sec;   INR: 2.50 ratio

## 2021-01-13 NOTE — PROGRESS NOTE ADULT - SUBJECTIVE AND OBJECTIVE BOX
VITAL SIGNS    Telemetry:    Vital Signs Last 24 Hrs  T(C): 36.8 (21 @ 07:27), Max: 36.9 (21 @ 04:50)  T(F): 98.2 (21 @ 07:27), Max: 98.4 (21 @ 04:50)  HR: 85 (21 @ 07:27) (70 - 87)  BP: 101/67 (21 @ 11:57) (101/67 - 101/67)  RR: 16 (21 @ 07:27) (16 - 18)  SpO2: 99% (21 @ 07:27) (99% - 100%)             @ 07:01  -   @ 07:00  --------------------------------------------------------  IN: 0 mL / OUT: 810 mL / NET: -810 mL       Daily     Daily Weight in k.6 (2021 08:47)  Admit Wt: Drug Dosing Weight  Height (cm): 177.8 (2021 09:28)  Weight (kg): 74 (2021 10:48)  BMI (kg/m2): 23.4 (2021 10:48)  BSA (m2): 1.91 (2021 10:48)    Bilirubin Total, Serum: 0.3 mg/dL ( @ 05:52)    CAPILLARY BLOOD GLUCOSE              acetaminophen   Tablet .. 650 milliGRAM(s) Oral every 6 hours PRN  aMIOdarone    Tablet 200 milliGRAM(s) Oral daily  cyanocobalamin 1000 MICROGram(s) Oral daily  finasteride 5 milliGRAM(s) Oral daily  folic acid 1 milliGRAM(s) Oral daily  gabapentin 100 milliGRAM(s) Oral at bedtime  influenza   Vaccine 0.5 milliLiter(s) IntraMuscular once  metoprolol succinate ER 25 milliGRAM(s) Oral daily  mirtazapine 7.5 milliGRAM(s) Oral at bedtime  pantoprazole    Tablet 40 milliGRAM(s) Oral before breakfast  polyethylene glycol 3350 17 Gram(s) Oral two times a day  simethicone 80 milliGRAM(s) Chew three times a day  sodium chloride 0.9% lock flush 3 milliLiter(s) IV Push every 8 hours  spironolactone 25 milliGRAM(s) Oral daily      PHYSICAL EXAM    Subjective: "Hi.   Neurology: alert and oriented x 3, nonfocal, no gross deficits  CV : tele:  RSR  Sternal Wound :  CDI with dressing , Stable  Lungs: clear. RR easy, unlabored   Abdomen: soft, nontender, nondistended, positive bowel sounds, bowel movement   Neg N/V/D   :  pt voiding without difficulty   Extremities:   BLANDON; edema, neg calf tenderness.   PPP bilaterally      PW:  Chest tubes:                 VITAL SIGNS    Telemetry:  rsr 80-90   Vital Signs Last 24 Hrs  T(C): 36.8 (21 @ 07:27), Max: 36.9 (21 @ 04:50)  T(F): 98.2 (21 @ 07:27), Max: 98.4 (21 @ 04:50)  HR: 85 (21 @ 07:27) (70 - 87)  BP: 101/67 (21 @ 11:57) (101/67 - 101/67)  RR: 16 (21 @ 07:27) (16 - 18)  SpO2: 99% (21 @ 07:27) (99% - 100%)             07:01  -   @ 07:00  --------------------------------------------------------  IN: 0 mL / OUT: 810 mL / NET: -810 mL       Daily     Daily Weight in k.6 (2021 08:47)  Admit Wt: Drug Dosing Weight  Height (cm): 177.8 (2021 09:28)  Weight (kg): 74 (2021 10:48)  BMI (kg/m2): 23.4 (2021 10:48)  BSA (m2): 1.91 (2021 10:48)    Bilirubin Total, Serum: 0.3 mg/dL ( @ 05:52)      acetaminophen   Tablet .. 650 milliGRAM(s) Oral every 6 hours PRN  aMIOdarone    Tablet 200 milliGRAM(s) Oral daily  cyanocobalamin 1000 MICROGram(s) Oral daily  finasteride 5 milliGRAM(s) Oral daily  folic acid 1 milliGRAM(s) Oral daily  gabapentin 100 milliGRAM(s) Oral at bedtime  influenza   Vaccine 0.5 milliLiter(s) IntraMuscular once  metoprolol succinate ER 25 milliGRAM(s) Oral daily  mirtazapine 7.5 milliGRAM(s) Oral at bedtime  pantoprazole    Tablet 40 milliGRAM(s) Oral before breakfast  polyethylene glycol 3350 17 Gram(s) Oral two times a day  simethicone 80 milliGRAM(s) Chew three times a day  sodium chloride 0.9% lock flush 3 milliLiter(s) IV Push every 8 hours  spironolactone 25 milliGRAM(s) Oral daily      PHYSICAL EXAM    Subjective: "I feel ok."   Neurology: alert and oriented x 3, nonfocal, no gross deficits  CV : tele:  RSR 80-90; typical LVAD HUM   Sternal Wound :  CDI TAI- well healed  Lungs: clear. RR easy, unlabored   Abdomen: soft, nontender, nondistended, positive bowel sounds, + bowel movement /;  Neg N/V/D; driveline site cdi w/ dressing    :  pt voiding without difficulty   Extremities:   BLANDON; neg LE edema, neg calf tenderness.  PPP bilaterally      PW: no  Chest tubes: no

## 2021-01-13 NOTE — PROGRESS NOTE ADULT - PROBLEM SELECTOR PLAN 1
- readmitted with H/H 6.5/22 in the setting of INR 2.89. Was transfused with 1uPRBC on 1/11and responded appropriately   - Fecal Occult negative  - D/c Iron supplements given constipation  - Reticulocyte 1.1 (inapprop response), Haptoglobin wnl  - please consult hematology

## 2021-01-13 NOTE — PROGRESS NOTE ADULT - ASSESSMENT
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 coli UTIs and COVID-19 (back in April 2020). Overall patient has been progressing well. He was recently seen in clinic where he complained of abdominal pain and constipation. He was started on Doxycycline 1/7/21 due to driveline drainage and labs last week were unremarkable. He presents to [facility] ER stating that for the past 4 days that his abdominal pain has gotten worse, still not having bowel movements despite being on stool softeners and now having lower back pain. Todays labs show patient is anemic with H/H of 6.5/22, fecal occult pending. INR is 2.89. He is febrile with Tmax of 101, saturating on 99% room air, MAP in the 70s. He denies any chest pain, shortness of breath, dizziness, changes in urination or muscle aches.   1/12 INR 3.0  Followed by heart failure team  NPO until GI bleed source ruled out> CT neg 65M PMH ACC/AHA stage D HF due to NICM HM2 LVAD , TV annuloplasty ring 9/12/17 as destination therapy due to severe peripheral artery disease with significant stenosis  SIADH, Depression, CKD-3 with hyperkalemia, past E. coli UTIs and COVID-19 (back in April 2020). Overall patient has been progressing well. He was recently seen in clinic where he complained of abdominal pain and constipation. He was started on Doxycycline 1/7/21 due to driveline drainage and labs last week were unremarkable. He presents to [facility] ER stating that for the past 4 days that his abdominal pain has gotten worse, still not having bowel movements despite being on stool softeners and now having lower back pain. Todays labs show patient is anemic with H/H of 6.5/22, fecal occult pending. INR is 2.89. He is febrile with Tmax of 101, saturating on 99% room air, MAP in the 70s. He denies any chest pain, shortness of breath, dizziness, changes in urination or muscle aches.   1/12 INR 3.0  Followed by heart failure team  NPO until GI bleed source ruled out> CT neg  1/13 VSS; INR 2.5 - continue to hold coumadin as per VAD team; h/h stable 8/29 today; bc neg 1/11 and ua neg  continue to observe off abx ; ID following    65M PMH ACC/AHA stage D HF due to NICM HM2 LVAD , TV annuloplasty ring 9/12/17 as destination therapy due to severe peripheral artery disease with significant stenosis  SIADH, Depression, CKD-3 with hyperkalemia, past E. coli UTIs and COVID-19 (back in April 2020). Overall patient has been progressing well. He was recently seen in clinic where he complained of abdominal pain and constipation. He was started on Doxycycline 1/7/21 due to driveline drainage and labs last week were unremarkable. He presents to [facility] ER stating that for the past 4 days that his abdominal pain has gotten worse, still not having bowel movements despite being on stool softeners and now having lower back pain. Todays labs show patient is anemic with H/H of 6.5/22, fecal occult pending. INR is 2.89. He is febrile with Tmax of 101, saturating on 99% room air, MAP in the 70s. He denies any chest pain, shortness of breath, dizziness, changes in urination or muscle aches.   1/12 INR 3.0  Followed by heart failure team  NPO until GI bleed source ruled out> CT neg  1/13 VSS; INR 2.5 - resume coumadin 1 mg this evening as per LVAD team ; h/h stable 8/29 today; bc neg 1/11 and ua neg; heme consulted today for pancytopenia   continue to observe off abx ; ID following

## 2021-01-13 NOTE — CONSULT NOTE ADULT - ASSESSMENT
64 y/o M w/ h/o stage D NICM/HFrEF s/p HM2 (as DT 9/17 d/t PAD), TV ring, PAD, prior COVID (4/20) who was readmitted with abdominal pain x 4-5 days in setting of constipation as well as recent initiation of doxycycline for driveline drainage. Labs showed patient to be anemic with H/H of 6.5/22 in setting of INR 2.89. Hematology has been consulted for the same.    #Pancytopenia  -WBC has been trending down during admission  -Patient has been chronically anemic since April 2020, dropping to 6.5 on this admission and responding appropriately to transfusions  -Differential diagnosis of anemia is broad and includes, but is not limited to, acute blood loss vs. B12/folate deficiency vs. Iron deficiency vs. marrow disorder vs. hemolysis vs. autoimme vs. medication induced  -Medications reviewed  -B12, folate, LDH and Hapto are WNL. Iron panel incomplete. Retic 1.1%  -Labs are not consistent with hemolysis  -CT A/P on 1/11 shows no source of bleeding  -Peripheral smear will be reviewed  -Transfuse for Hgb <7  -Differential diagnosis of thrombocytopenia includes, but is not limited to,  medication induced vs. infection vs. TMA vs. DIC vs. ITP vs. reactive  -Please check Hep B and HIV  -PLASMIC score: 3--low risk of TTP  -Transfuse for platelets  <10k ( or <15k if febrile or <50k if non-CNS bleeding)    Rachelle Ramos MD  Hematology/Oncology Fellow, PGY-4  Pager: [phone]  After 5pm and on weekends please page on-call fellow   66 y/o M w/ h/o stage D NICM/HFrEF s/p HM2 (as DT 9/17 d/t PAD), TV ring, PAD, prior COVID (4/20) who was readmitted with abdominal pain x 4-5 days in setting of constipation as well as recent initiation of doxycycline for driveline drainage. Labs showed patient to be anemic with H/H of 6.5/22 in setting of INR 2.89. Hematology has been consulted for the same.    #Pancytopenia  -WBC has been trending down during admission  -Patient has been chronically anemic since April 2020, dropping to 6.5 on this admission and responding appropriately to transfusions  -Differential diagnosis of anemia is broad and includes, but is not limited to, acute blood loss vs. B12/folate deficiency vs. Iron deficiency vs. marrow disorder vs. hemolysis vs. autoimme vs. medication induced  -Medications reviewed  -B12, folate, LDH and Hapto are WNL. Iron panel incomplete. Retic 1.1%  -Labs are not consistent with hemolysis  -CT A/P on 1/11 shows no source of bleeding  -Peripheral smear will be reviewed  -Transfuse for Hgb <7  -Differential diagnosis of thrombocytopenia includes, but is not limited to,  medication induced vs. infection vs. TMA vs. DIC vs. ITP vs. reactive  Will check Hep B and HIV, CMV, EBV  -PLASMIC score: 3--low risk of TTP  -Transfuse for platelets  <10k ( or <15k if febrile or <50k if non-CNS bleeding)    Rachelle Ramos MD  Hematology/Oncology Fellow, PGY-4  Pager: [phone]  After 5pm and on weekends please page on-call fellow   66 y/o M w/ h/o stage D NICM/HFrEF s/p HM2 (as DT 9/17 d/t PAD), TV ring, PAD, prior COVID (4/20) who was readmitted with abdominal pain x 4-5 days in setting of constipation as well as recent initiation of doxycycline for driveline drainage. Labs showed patient to be anemic with H/H of 6.5/22 in setting of INR 2.89. Hematology has been consulted for the same.    #Pancytopenia  -WBC has been trending down during admission  -Patient has been chronically anemic since April 2020, dropping to 6.5 on this admission and responding appropriately to transfusions  -Differential diagnosis of anemia is broad and includes, but is not limited to, acute blood loss vs. B12/folate deficiency vs. Iron deficiency vs. marrow disorder vs. hemolysis vs. autoimme vs. medication induced  -B12, folate, LDH and Hapto are WNL. Iron panel incomplete. Retic 1.1%  -Labs are not consistent with hemolysis  -CT A/P on 1/11 shows no source of bleeding  -Peripheral smear will be reviewed  -Transfuse for Hgb <7  -Differential diagnosis of thrombocytopenia includes, but is not limited to,  medication induced vs. infection vs. TMA vs. DIC vs. ITP vs. reactive  Will check Hep B and HIV, CMV, EBV  -PLASMIC score: 3--low risk of TTP  -Medications reviewed. Mirtazapine and Protonix can cause thrombocytopenia.  -Transfuse for platelets  <10k ( or <15k if febrile or <50k if non-CNS bleeding)    Rachelle Ramos MD  Hematology/Oncology Fellow, PGY-4  Pager: [phone]  After 5pm and on weekends please page on-call fellow   66 y/o M w/ h/o stage D NICM/HFrEF s/p HM2 (as DT 9/17 d/t PAD), TV ring, PAD, prior COVID (4/20) who was readmitted with abdominal pain x 4-5 days in setting of constipation as well as recent initiation of doxycycline for driveline drainage. Labs showed patient to be anemic with H/H of 6.5/22 in setting of INR 2.89. Hematology has been consulted for the same.    #Pancytopenia  -WBC has been trending down during admission  -Patient has been chronically anemic since April 2020, dropping to 6.5 on this admission and responding appropriately to transfusions  -Differential diagnosis of anemia is broad and includes, but is not limited to, acute blood loss vs. B12/folate deficiency vs. Iron deficiency vs. marrow disorder vs. hemolysis vs. autoimme vs. medication induced  -B12, folate, LDH and Hapto are WNL. Iron panel incomplete. Retic 1.1%  -Labs are not consistent with hemolysis  -CT A/P on 1/11 shows no source of bleeding  -Peripheral smear will be reviewed  -Transfuse for Hgb <7  -Differential diagnosis of thrombocytopenia includes, but is not limited to,  medication induced vs. infection vs. TMA vs. DIC vs. ITP vs. reactive  -Will check Hep B and HIV, CMV, EBV. These have been ordered.  -PLASMIC score: 3--low risk of TTP  -Medications reviewed. Mirtazapine and Protonix can cause thrombocytopenia.  -Pancytopenia is most likely multifactorial, due to slight hemolysis from LVAD and ongoing infection  -Transfuse for platelets  <10k ( or <15k if febrile or <50k if non-CNS bleeding)    Rachelle Ramos MD  Hematology/Oncology Fellow, PGY-4  Pager: [phone]  After 5pm and on weekends please page on-call fellow

## 2021-01-13 NOTE — PROVIDER CONTACT NOTE (CHANGE IN STATUS NOTIFICATION) - ASSESSMENT
Patient had one episode of 4beats WCTs on cardiac monitor  Patient asymptomatic  MAP 82 Hr 82 O2 sat 98%

## 2021-01-13 NOTE — PROGRESS NOTE ADULT - ASSESSMENT
64 y/o M w/ h/o stage D NICM/HFrEF s/p HM2 (as DT 9/17 d/t PAD), TV ring, PAD, prior COVID (4/20) who was readmitted with abdominal pain x 4-5 days in setting of constipation as well as recent initiation of doxycycline for driveline drainage. Labs showed patient to be anemic with H/H of 6.5/22 in setting of INR 2.89 although guaiac negative and labs not notable for hemolysis. He was transfused with 1uPRBC on 1/11 and responded appropriately. Had febrile episodes (last on 1/12 with Tmax of 101.1). Blood cultures remains NGTD and UA is negative. Was given aggressive bowel regimen in hopes for resolution of constipation. Also developed pancytopenia.

## 2021-01-13 NOTE — PROGRESS NOTE ADULT - SUBJECTIVE AND OBJECTIVE BOX
INFECTIOUS DISEASES FOLLOW UP-- Anitra Prater  [phone]    This is a follow up note for this  65yMale with  Anemia  abdominal pain remains        ROS:  CONSTITUTIONAL:  No fever, poor appetite  CARDIOVASCULAR:  No chest pain or palpitations  RESPIRATORY:  No dyspnea  GASTROINTESTINAL:  No nausea, vomiting, diarrhea, improved abdominal pain  GENITOURINARY:  No dysuria  NEUROLOGIC:  No headache,     Allergies    No Known Allergies    Intolerances        ANTIBIOTICS/RELEVANT:  antimicrobials    immunologic:  influenza   Vaccine 0.5 milliLiter(s) IntraMuscular once    OTHER:  acetaminophen   Tablet .. 650 milliGRAM(s) Oral every 6 hours PRN  aMIOdarone    Tablet 200 milliGRAM(s) Oral daily  cyanocobalamin 1000 MICROGram(s) Oral daily  finasteride 5 milliGRAM(s) Oral daily  folic acid 1 milliGRAM(s) Oral daily  gabapentin 100 milliGRAM(s) Oral at bedtime  metoprolol succinate ER 25 milliGRAM(s) Oral daily  mirtazapine 7.5 milliGRAM(s) Oral at bedtime  pantoprazole    Tablet 40 milliGRAM(s) Oral before breakfast  polyethylene glycol 3350 17 Gram(s) Oral two times a day  simethicone 80 milliGRAM(s) Chew three times a day  sodium chloride 0.9% lock flush 3 milliLiter(s) IV Push every 8 hours  spironolactone 25 milliGRAM(s) Oral daily      Objective:  Vital Signs Last 24 Hrs  T(C): 36.2 (2021 20:25), Max: 36.9 (2021 04:50)  T(F): 97.2 (2021 20:25), Max: 98.4 (2021 04:50)  HR: 74 (2021 21:27) (70 - 87)  BP: 97/64 (2021 21:27) (97/64 - 97/64)  BP(mean): 82 (2021 21:39) (80 - 98)  RR: 18 (2021 20:25) (16 - 18)  SpO2: 99% (2021 20:25) (98% - 100%)    PHYSICAL EXAM:  Constitutional:no acute distress  Eyes:ALONSO, EOMI  Ear/Nose/Throat: no oral lesions, 	  Respiratory: clear BL  Cardiovascular: S1S2  Gastrointestinal:soft, (+) BS,  tenderness lower mid epigastric area  Extremities:no e/e/c  No Lymphadenopathy  IV sites not inflammed.    LABS:                        8.7    2.58  )-----------( 88       ( 2021 05:52 )             29.0         133<L>  |  101  |  20  ----------------------------<  88  4.2   |  22  |  1.10    Ca    8.6      2021 05:52    TPro  7.6  /  Alb  3.6  /  TBili  0.3  /  DBili  x   /  AST  48<H>  /  ALT  23  /  AlkPhos  74      PT/INR - ( 2021 05:52 )   PT: 28.7 sec;   INR: 2.50 ratio           Urinalysis Basic - ( 2021 13:17 )    Color: Yellow / Appearance: Clear / S.029 / pH: x  Gluc: x / Ketone: Small  / Bili: Negative / Urobili: Negative   Blood: x / Protein: 30 mg/dL / Nitrite: Negative   Leuk Esterase: Negative / RBC: 1 /hpf / WBC 1 /HPF   Sq Epi: x / Non Sq Epi: 0 /hpf / Bacteria: Negative        MICROBIOLOGY:            RECENT CULTURES:   @ 16:36  .Blood Blood  --  --  --    No growth to date.  --      RADIOLOGY & ADDITIONAL STUDIES:    < from: CT Abdomen and Pelvis No Cont (21 @ 14:10) >  IMPRESSION:    No evidence of acute pathology in the chest or abdomen.    In regards to clinical questions, thereis no evidence of infection or hemorrhage.    < end of copied text >

## 2021-01-13 NOTE — PROGRESS NOTE ADULT - ASSESSMENT
64 y/o M w/ h/o stage D NICM/HFrEF s/p HM2 (as DT 9/17 d/t PAD), TV ring, PAD, prior COVID (4/20) who was readmitted with abdominal pain x 4-5 days in setting of constipation as well as recent initiation of doxycycline for driveline drainage. Labs showed patient to be anemic with H/H of 6.5/22 in setting of INR 2.89. He was transfused with 1uPRBC on 1/11 and responded appropriately. Continues to have febrile episodes (last on 1/12 with Tmax of 101.1). Blood cultures remains NGTD and UA is negative. Was given aggressive bowel regimen today in hopes for resolution of constipation. Hopefully that abdominal pain will resolve once patient has bowel movement.       Assessment- 64 yo man with HM-2 admitted with fevers and mid epigastric abdominal pain  CT of abdomen relatively so unclear if he passed a gall stone or kidney stone or other etiology    Would:   blood cultures no growth to date  check quantiferon  check urine histo ag.  repeat UA and culture- in case he passed a renal calculus  COVID IGG ab  strongyloides ab    unclear etiology of his abdominal pain- consider ultrasound      Morris Prater MD  [phone]  After 5pm/weekends [phone]

## 2021-01-13 NOTE — PROGRESS NOTE ADULT - PROBLEM SELECTOR PLAN 1
Management as per heart failure  Coumadin as per heart failure Management as per heart failure  hold coumadin as per CHF team  monitor cbc- h/h stable today 8/29  daily LDh - 439 today  continue current medication regimen as per CHF team Management as per heart failure  resume coumadin 1 mg this evening as per LVAD team   monitor cbc- h/h stable today 8/29  daily LDh - 439 today  continue current medication regimen as per CHF team

## 2021-01-14 LAB
ANION GAP SERPL CALC-SCNC: 11 MMOL/L — SIGNIFICANT CHANGE UP (ref 5–17)
BASOPHILS # BLD AUTO: 0.05 K/UL — SIGNIFICANT CHANGE UP (ref 0–0.2)
BASOPHILS NFR BLD AUTO: 1.4 % — SIGNIFICANT CHANGE UP (ref 0–2)
BUN SERPL-MCNC: 18 MG/DL — SIGNIFICANT CHANGE UP (ref 7–23)
CALCIUM SERPL-MCNC: 8.2 MG/DL — LOW (ref 8.4–10.5)
CHLORIDE SERPL-SCNC: 99 MMOL/L — SIGNIFICANT CHANGE UP (ref 96–108)
CO2 SERPL-SCNC: 24 MMOL/L — SIGNIFICANT CHANGE UP (ref 22–31)
CREAT SERPL-MCNC: 1.16 MG/DL — SIGNIFICANT CHANGE UP (ref 0.5–1.3)
EOSINOPHIL # BLD AUTO: 0.01 K/UL — SIGNIFICANT CHANGE UP (ref 0–0.5)
EOSINOPHIL NFR BLD AUTO: 0.4 % — SIGNIFICANT CHANGE UP (ref 0–6)
GAMMA INTERFERON BACKGROUND BLD IA-ACNC: 0.03 IU/ML — SIGNIFICANT CHANGE UP
GIANT PLATELETS BLD QL SMEAR: PRESENT — SIGNIFICANT CHANGE UP
GLUCOSE SERPL-MCNC: 93 MG/DL — SIGNIFICANT CHANGE UP (ref 70–99)
HAV IGM SER-ACNC: SIGNIFICANT CHANGE UP
HBV CORE AB SER-ACNC: REACTIVE
HBV CORE IGM SER-ACNC: SIGNIFICANT CHANGE UP
HBV SURFACE AB SER-ACNC: REACTIVE
HBV SURFACE AG SER-ACNC: SIGNIFICANT CHANGE UP
HCT VFR BLD CALC: 25.7 % — LOW (ref 39–50)
HCT VFR BLD CALC: 25.8 % — LOW (ref 39–50)
HCV AB S/CO SERPL IA: 0.08 S/CO — SIGNIFICANT CHANGE UP (ref 0–0.99)
HCV AB SERPL-IMP: SIGNIFICANT CHANGE UP
HGB BLD-MCNC: 7.9 G/DL — LOW (ref 13–17)
HGB BLD-MCNC: 8 G/DL — LOW (ref 13–17)
HIV 1+2 AB+HIV1 P24 AG SERPL QL IA: SIGNIFICANT CHANGE UP
INR BLD: 1.76 RATIO — HIGH (ref 0.88–1.16)
LDH SERPL L TO P-CCNC: 389 U/L — HIGH (ref 50–242)
LYMPHOCYTES # BLD AUTO: 1.89 K/UL — SIGNIFICANT CHANGE UP (ref 1–3.3)
LYMPHOCYTES # BLD AUTO: 52.7 % — HIGH (ref 13–44)
M TB IFN-G BLD-IMP: NEGATIVE — SIGNIFICANT CHANGE UP
M TB IFN-G CD4+ BCKGRND COR BLD-ACNC: 0.01 IU/ML — SIGNIFICANT CHANGE UP
M TB IFN-G CD4+CD8+ BCKGRND COR BLD-ACNC: 0.01 IU/ML — SIGNIFICANT CHANGE UP
MANUAL SMEAR VERIFICATION: SIGNIFICANT CHANGE UP
MCHC RBC-ENTMCNC: 24.1 PG — LOW (ref 27–34)
MCHC RBC-ENTMCNC: 24.3 PG — LOW (ref 27–34)
MCHC RBC-ENTMCNC: 30.7 GM/DL — LOW (ref 32–36)
MCHC RBC-ENTMCNC: 31 GM/DL — LOW (ref 32–36)
MCV RBC AUTO: 78.4 FL — LOW (ref 80–100)
MCV RBC AUTO: 78.4 FL — LOW (ref 80–100)
MONOCYTES # BLD AUTO: 0.42 K/UL — SIGNIFICANT CHANGE UP (ref 0–0.9)
MONOCYTES NFR BLD AUTO: 11.8 % — SIGNIFICANT CHANGE UP (ref 2–14)
NEUTROPHILS # BLD AUTO: 1.16 K/UL — LOW (ref 1.8–7.4)
NEUTROPHILS NFR BLD AUTO: 32.3 % — LOW (ref 43–77)
NRBC # BLD: 0 /100 WBCS — SIGNIFICANT CHANGE UP (ref 0–0)
NRBC # BLD: 0 /100 WBCS — SIGNIFICANT CHANGE UP (ref 0–0)
NRBC # BLD: 1 /100 — HIGH (ref 0–0)
PLAT MORPH BLD: ABNORMAL
PLATELET # BLD AUTO: 100 K/UL — LOW (ref 150–400)
PLATELET # BLD AUTO: 93 K/UL — LOW (ref 150–400)
POTASSIUM SERPL-MCNC: 3.8 MMOL/L — SIGNIFICANT CHANGE UP (ref 3.5–5.3)
POTASSIUM SERPL-SCNC: 3.8 MMOL/L — SIGNIFICANT CHANGE UP (ref 3.5–5.3)
PROTHROM AB SERPL-ACNC: 20.6 SEC — HIGH (ref 10.6–13.6)
QUANT TB PLUS MITOGEN MINUS NIL: 8.37 IU/ML — SIGNIFICANT CHANGE UP
RBC # BLD: 3.28 M/UL — LOW (ref 4.2–5.8)
RBC # BLD: 3.29 M/UL — LOW (ref 4.2–5.8)
RBC # FLD: 17.6 % — HIGH (ref 10.3–14.5)
RBC # FLD: 17.6 % — HIGH (ref 10.3–14.5)
RBC BLD AUTO: SIGNIFICANT CHANGE UP
SARS-COV-2 IGG SERPL QL IA: POSITIVE
SARS-COV-2 IGM SERPL IA-ACNC: 2.37 INDEX — HIGH
SODIUM SERPL-SCNC: 134 MMOL/L — LOW (ref 135–145)
VARIANT LYMPHS # BLD: 1.4 % — SIGNIFICANT CHANGE UP (ref 0–6)
WBC # BLD: 3.59 K/UL — LOW (ref 3.8–10.5)
WBC # BLD: 3.83 K/UL — SIGNIFICANT CHANGE UP (ref 3.8–10.5)
WBC # FLD AUTO: 3.59 K/UL — LOW (ref 3.8–10.5)
WBC # FLD AUTO: 3.83 K/UL — SIGNIFICANT CHANGE UP (ref 3.8–10.5)

## 2021-01-14 PROCEDURE — 74018 RADEX ABDOMEN 1 VIEW: CPT | Mod: 26

## 2021-01-14 PROCEDURE — 99231 SBSQ HOSP IP/OBS SF/LOW 25: CPT

## 2021-01-14 PROCEDURE — 99232 SBSQ HOSP IP/OBS MODERATE 35: CPT

## 2021-01-14 PROCEDURE — 93750 INTERROGATION VAD IN PERSON: CPT

## 2021-01-14 PROCEDURE — 99233 SBSQ HOSP IP/OBS HIGH 50: CPT | Mod: 25

## 2021-01-14 RX ORDER — WARFARIN SODIUM 2.5 MG/1
2.5 TABLET ORAL ONCE
Refills: 0 | Status: COMPLETED | OUTPATIENT
Start: 2021-01-14 | End: 2021-01-14

## 2021-01-14 RX ORDER — MULTIVIT WITH MIN/MFOLATE/K2 340-15/3 G
1 POWDER (GRAM) ORAL ONCE
Refills: 0 | Status: COMPLETED | OUTPATIENT
Start: 2021-01-14 | End: 2021-01-14

## 2021-01-14 RX ORDER — POTASSIUM CHLORIDE 20 MEQ
20 PACKET (EA) ORAL ONCE
Refills: 0 | Status: COMPLETED | OUTPATIENT
Start: 2021-01-14 | End: 2021-01-14

## 2021-01-14 RX ADMIN — Medication 1 BOTTLE: at 12:46

## 2021-01-14 RX ADMIN — MIRTAZAPINE 7.5 MILLIGRAM(S): 45 TABLET, ORALLY DISINTEGRATING ORAL at 21:13

## 2021-01-14 RX ADMIN — Medication 20 MILLIEQUIVALENT(S): at 07:42

## 2021-01-14 RX ADMIN — Medication 1 MILLIGRAM(S): at 12:47

## 2021-01-14 RX ADMIN — SIMETHICONE 80 MILLIGRAM(S): 80 TABLET, CHEWABLE ORAL at 12:47

## 2021-01-14 RX ADMIN — SODIUM CHLORIDE 3 MILLILITER(S): 9 INJECTION INTRAMUSCULAR; INTRAVENOUS; SUBCUTANEOUS at 14:25

## 2021-01-14 RX ADMIN — SIMETHICONE 80 MILLIGRAM(S): 80 TABLET, CHEWABLE ORAL at 21:13

## 2021-01-14 RX ADMIN — SPIRONOLACTONE 25 MILLIGRAM(S): 25 TABLET, FILM COATED ORAL at 05:02

## 2021-01-14 RX ADMIN — POLYETHYLENE GLYCOL 3350 17 GRAM(S): 17 POWDER, FOR SOLUTION ORAL at 17:31

## 2021-01-14 RX ADMIN — SODIUM CHLORIDE 3 MILLILITER(S): 9 INJECTION INTRAMUSCULAR; INTRAVENOUS; SUBCUTANEOUS at 21:13

## 2021-01-14 RX ADMIN — PREGABALIN 1000 MICROGRAM(S): 225 CAPSULE ORAL at 12:47

## 2021-01-14 RX ADMIN — AMIODARONE HYDROCHLORIDE 200 MILLIGRAM(S): 400 TABLET ORAL at 05:02

## 2021-01-14 RX ADMIN — SIMETHICONE 80 MILLIGRAM(S): 80 TABLET, CHEWABLE ORAL at 05:02

## 2021-01-14 RX ADMIN — PANTOPRAZOLE SODIUM 40 MILLIGRAM(S): 20 TABLET, DELAYED RELEASE ORAL at 05:02

## 2021-01-14 RX ADMIN — SODIUM CHLORIDE 3 MILLILITER(S): 9 INJECTION INTRAMUSCULAR; INTRAVENOUS; SUBCUTANEOUS at 05:02

## 2021-01-14 RX ADMIN — WARFARIN SODIUM 2.5 MILLIGRAM(S): 2.5 TABLET ORAL at 21:13

## 2021-01-14 RX ADMIN — FINASTERIDE 5 MILLIGRAM(S): 5 TABLET, FILM COATED ORAL at 12:47

## 2021-01-14 RX ADMIN — Medication 25 MILLIGRAM(S): at 05:02

## 2021-01-14 RX ADMIN — GABAPENTIN 100 MILLIGRAM(S): 400 CAPSULE ORAL at 21:13

## 2021-01-14 NOTE — PROGRESS NOTE ADULT - SUBJECTIVE AND OBJECTIVE BOX
Subjective:    Medications:  acetaminophen   Tablet .. 650 milliGRAM(s) Oral every 6 hours PRN  aMIOdarone    Tablet 200 milliGRAM(s) Oral daily  cyanocobalamin 1000 MICROGram(s) Oral daily  finasteride 5 milliGRAM(s) Oral daily  folic acid 1 milliGRAM(s) Oral daily  gabapentin 100 milliGRAM(s) Oral at bedtime  influenza   Vaccine 0.5 milliLiter(s) IntraMuscular once  metoprolol succinate ER 25 milliGRAM(s) Oral daily  mirtazapine 7.5 milliGRAM(s) Oral at bedtime  pantoprazole    Tablet 40 milliGRAM(s) Oral before breakfast  polyethylene glycol 3350 17 Gram(s) Oral two times a day  simethicone 80 milliGRAM(s) Chew three times a day  sodium chloride 0.9% lock flush 3 milliLiter(s) IV Push every 8 hours  spironolactone 25 milliGRAM(s) Oral daily      Physical Exam:    Vitals:  Vital Signs Last 24 Hours  T(C): 36.4 (21 @ 07:22), Max: 36.7 (21 @ 15:08)  HR: 75 (21 @ 07:22) (70 - 82)  BP: 97/64 (21 @ 21:27) (97/64 - 97/64)  RR: 16 (21 @ 07:22) (16 - 18)  SpO2: 98% (21 @ 07:22) (98% - 99%)    Weight in k.6 ( @ 08:47)    I&O's Summary    2021 07:01  -  2021 07:00  --------------------------------------------------------  IN: 0 mL / OUT: 330 mL / NET: -330 mL        Tele:    General: No distress. Comfortable.  HEENT: EOM intact.  Neck: Neck supple. JVP not elevated. No masses  Chest: Clear to auscultation bilaterally  CV: Normal S1 and S2. No murmurs, rub, or gallops. Radial pulses normal.  Abdomen: Soft, non-distended, non-tender  Skin: No rashes or skin breakdown  Neurology: Alert and oriented times three. Sensation intact  Psych: Affect normal    LVAD Interrogation  VAD TYPE  Speed  Flow  Power  PI   HVAD wave form description  Assessment of driveline exit site  Programming changes:     Labs:                        8.0    3.83  )-----------( 100      ( 2021 05:42 )             25.8         134<L>  |  99  |  18  ----------------------------<  93  3.8   |  24  |  1.16    Ca    8.2<L>      2021 05:42    TPro  7.6  /  Alb  3.6  /  TBili  0.3  /  DBili  x   /  AST  48<H>  /  ALT  23  /  AlkPhos  74      PT/INR - ( 2021 05:42 )   PT: 20.6 sec;   INR: 1.76 ratio        Serum Pro-Brain Natriuretic Peptide: 315 pg/mL ( @ 10:34)      Lactate Dehydrogenase, Serum: 389 U/L ( @ 05:42)  Lactate Dehydrogenase, Serum: 439 U/L ( @ 05:52)  Lactate Dehydrogenase, Serum: 424 U/L ( @ 05:14)  Lactate Dehydrogenase, Serum: 374 U/L ( @ 10:34)   Subjective: Patient seen and examined resting in bed. ON no issues. Complains of LUQ and LLQ abdominal pain     Medications:  acetaminophen   Tablet .. 650 milliGRAM(s) Oral every 6 hours PRN  aMIOdarone    Tablet 200 milliGRAM(s) Oral daily  cyanocobalamin 1000 MICROGram(s) Oral daily  finasteride 5 milliGRAM(s) Oral daily  folic acid 1 milliGRAM(s) Oral daily  gabapentin 100 milliGRAM(s) Oral at bedtime  influenza   Vaccine 0.5 milliLiter(s) IntraMuscular once  metoprolol succinate ER 25 milliGRAM(s) Oral daily  mirtazapine 7.5 milliGRAM(s) Oral at bedtime  pantoprazole    Tablet 40 milliGRAM(s) Oral before breakfast  polyethylene glycol 3350 17 Gram(s) Oral two times a day  simethicone 80 milliGRAM(s) Chew three times a day  sodium chloride 0.9% lock flush 3 milliLiter(s) IV Push every 8 hours  spironolactone 25 milliGRAM(s) Oral daily      Vitals:  Vital Signs Last 24 Hours  T(C): 36.4 (21 @ 07:22), Max: 36.7 (21 @ 15:08)  HR: 75 (21 @ 07:22) (70 - 82)  BP: 97/64 (21 @ 21:27) (97/64 - 97/64)  RR: 16 (21 @ 07:22) (16 - 18)  SpO2: 98% (21 @ 07:22) (98% - 99%)    Weight in k.6 ( @ 08:47)    I&O's Summary    2021 07:01  -  2021 07:00  --------------------------------------------------------  IN: 0 mL / OUT: 330 mL / NET: -330 mL        Physical Exam  General: No distress. Comfortable.  HEENT: EOM intact.  Neck: Neck supple. JVP not elevated. No masses  Chest: Clear to auscultation bilaterally  CV: +VAD hum  Abdomen: Soft, non-distended, tender to LLQ and LUQ  Skin: No rashes or skin breakdown  Neurology: Alert and oriented times three. Sensation intact    LVAD Interrogation  VAD TYPE HM 2  Speed 8800  Flow 4.5  Power 4.7  PI  7  Assessment of driveline exit site: driveline dressing c/d/i  Programming changes: no changes made    Labs:                        8.0    3.83  )-----------( 100      ( 2021 05:42 )             25.8         134<L>  |  99  |  18  ----------------------------<  93  3.8   |  24  |  1.16    Ca    8.2<L>      2021 05:42    TPro  7.6  /  Alb  3.6  /  TBili  0.3  /  DBili  x   /  AST  48<H>  /  ALT  23  /  AlkPhos  74      PT/INR - ( 2021 05:42 )   PT: 20.6 sec;   INR: 1.76 ratio        Serum Pro-Brain Natriuretic Peptide: 315 pg/mL ( @ 10:34)      Lactate Dehydrogenase, Serum: 389 U/L ( @ 05:42)  Lactate Dehydrogenase, Serum: 439 U/L ( @ 05:52)  Lactate Dehydrogenase, Serum: 424 U/L ( @ 05:14)  Lactate Dehydrogenase, Serum: 374 U/L ( @ 10:34)

## 2021-01-14 NOTE — PROGRESS NOTE ADULT - ASSESSMENT
64 y/o M w/ h/o stage D NICM/HFrEF s/p HM2 (as DT 9/17 d/t PAD), TV ring, PAD, prior COVID (4/20) who was readmitted with abdominal pain x 4-5 days in setting of constipation as well as recent initiation of doxycycline for driveline drainage. Labs showed patient to be anemic with H/H of 6.5/22 in setting of INR 2.89 although guaiac negative and labs not notable for hemolysis. He was transfused with 1uPRBC on 1/11 and responded appropriately. Had febrile episodes (last on 1/12 with Tmax of 101.1). Blood cultures remain NGTD and UA is negative. He has developed pancytopenia which is most likely multifactorial ( may be due to slight hemolysis from LVAD and concerns for ongoing infection).  Medications have been reviewed and no changes made, not currently neutropenic, will continue to trend CBC. No signs of LVAD dysfunction at this time.  64 y/o M w/ h/o stage D NICM/HFrEF s/p HM2 (as DT 9/17 d/t PAD), TV ring, PAD, prior COVID (4/20) who was readmitted with abdominal pain x 4-5 days in setting of constipation as well as recent initiation of doxycycline for driveline drainage. Labs showed patient to be anemic with H/H of 6.5/22 in setting of INR 2.89 although guaiac negative and labs not notable for hemolysis. He was transfused with 1uPRBC on 1/11 and responded appropriately. Had febrile episodes (last on 1/12 with Tmax of 101.1). Blood cultures remain NGTD and UA is negative. He has developed pancytopenia which is most likely multifactorial ( may be due to slight hemolysis from LVAD and concerns for ongoing infection).  Medications have been reviewed and no changes made. He is currently not currently neutropenic, will continue to trend CBC. No signs of LVAD dysfunction at this time.  66 y/o M w/ h/o stage D NICM/HFrEF s/p HM2 (as DT 9/17 d/t PAD), TV ring, PAD, prior COVID (4/20) who was readmitted with abdominal pain x 4-5 days in setting of constipation as well as recent initiation of doxycycline for driveline drainage. Labs showed patient to be anemic with H/H of 6.5/22 in setting of INR 2.89 although guaiac negative and labs not notable for hemolysis. He was transfused with 1uPRBC on 1/11 and responded appropriately. Had febrile episodes (last on 1/12 with Tmax of 101.1). Blood cultures remain NGTD and UA is negative. He has developed pancytopenia which is most likely multifactorial ( may be due to slight hemolysis from LVAD and concerns for ongoing infection).  Medications have been reviewed and no changes made. Most recent labs suggest he is neutropenic, hematology following. No signs of LVAD dysfunction or alarms noted at this time.

## 2021-01-14 NOTE — PROGRESS NOTE ADULT - SUBJECTIVE AND OBJECTIVE BOX
INFECTIOUS DISEASES FOLLOW UP-- Anitra Prater  [phone]    This is a follow up note for this  65yMale with  Anemia  abdominal pain persists      ROS:  CONSTITUTIONAL:  No fever, good appetite  CARDIOVASCULAR:  No chest pain or palpitations  RESPIRATORY:  No dyspnea  GASTROINTESTINAL:  No nausea, vomiting, diarrhea, c/o abdominal pain  GENITOURINARY:  No dysuria  NEUROLOGIC:  No headache,     Allergies    No Known Allergies    Intolerances        ANTIBIOTICS/RELEVANT:  antimicrobials    immunologic:  influenza   Vaccine 0.5 milliLiter(s) IntraMuscular once    OTHER:  acetaminophen   Tablet .. 650 milliGRAM(s) Oral every 6 hours PRN  aMIOdarone    Tablet 200 milliGRAM(s) Oral daily  cyanocobalamin 1000 MICROGram(s) Oral daily  finasteride 5 milliGRAM(s) Oral daily  folic acid 1 milliGRAM(s) Oral daily  gabapentin 100 milliGRAM(s) Oral at bedtime  metoprolol succinate ER 25 milliGRAM(s) Oral daily  mirtazapine 7.5 milliGRAM(s) Oral at bedtime  pantoprazole    Tablet 40 milliGRAM(s) Oral before breakfast  polyethylene glycol 3350 17 Gram(s) Oral two times a day  simethicone 80 milliGRAM(s) Chew three times a day  sodium chloride 0.9% lock flush 3 milliLiter(s) IV Push every 8 hours  spironolactone 25 milliGRAM(s) Oral daily      Objective:  Vital Signs Last 24 Hrs  T(C): 36.6 (14 Jan 2021 20:49), Max: 36.7 (14 Jan 2021 11:11)  T(F): 97.8 (14 Jan 2021 20:49), Max: 98.1 (14 Jan 2021 11:11)  HR: 71 (14 Jan 2021 20:49) (69 - 91)  BP: --  BP(mean): 84 (14 Jan 2021 21:14) (82 - 84)  RR: 18 (14 Jan 2021 20:49) (16 - 18)  SpO2: 98% (14 Jan 2021 20:49) (98% - 100%)    PHYSICAL EXAM:  Constitutional:no acute distress  Eyes:ALONSO, EOMI  Ear/Nose/Throat: no oral lesions, 	  Respiratory: clear BL  Cardiovascular: S1S2  Gastrointestinal:soft, (+) BS, no tenderness, but distended  Extremities:no e/e/c  No Lymphadenopathy  IV sites not inflammed.    LABS:                        7.9    3.59  )-----------( 93       ( 14 Jan 2021 07:30 )             25.7     01-14    134<L>  |  99  |  18  ----------------------------<  93  3.8   |  24  |  1.16    Ca    8.2<L>      14 Jan 2021 05:42    TPro  7.6  /  Alb  3.6  /  TBili  0.3  /  DBili  x   /  AST  48<H>  /  ALT  23  /  AlkPhos  74  01-13    PT/INR - ( 14 Jan 2021 05:42 )   PT: 20.6 sec;   INR: 1.76 ratio               MICROBIOLOGY:            RECENT CULTURES:  01-11 @ 16:36  .Blood Blood  --  --  --    No growth to date.  --      RADIOLOGY & ADDITIONAL STUDIES:    < from: Xray Abdomen 1 View PORTABLE -Urgent (Xray Abdomen 1 View PORTABLE -Urgent .) (01.14.21 @ 10:52) >    IMPRESSION: Nonobstructive bowel gas pattern.    < end of copied text >  < from: CT Abdomen and Pelvis No Cont (01.11.21 @ 14:10) >    IMPRESSION:    No evidence of acute pathology in the chest or abdomen.    In regards to clinical questions, thereis no evidence of infection or hemorrhage.    < end of copied text >

## 2021-01-14 NOTE — PROGRESS NOTE ADULT - PROBLEM SELECTOR PLAN 5
- Admitted with constipation, now improving. Of note was on Miralax and Senna as outpatient. Require Mag Citrate on admission   - Abdominal X-ray shows nonobstructive bowel gas pattern with Large stool burden noted throughout the colon. - Admitted with constipation, since admission has only had 1 BM. Of note was on Miralax and Senna as outpatient. Given additional Mag Citrate today   - Abdominal X-ray 1/11 shows nonobstructive bowel gas pattern with Large stool burden noted throughout the colon.

## 2021-01-14 NOTE — PROGRESS NOTE ADULT - SUBJECTIVE AND OBJECTIVE BOX
VITAL SIGNS    Telemetry:      sr  90    Vital Signs Last 24 Hrs  T(C): 36.7 (01-14-21 @ 11:11), Max: 36.7 (01-13-21 @ 15:08)  T(F): 98.1 (01-14-21 @ 11:11), Max: 98.1 (01-13-21 @ 15:08)  HR: 91 (01-14-21 @ 11:11) (70 - 91)  BP: 97/64 (01-13-21 @ 21:27) (97/64 - 97/64)  RR: 16 (01-14-21 @ 11:11) (16 - 18)  SpO2: 100% (01-14-21 @ 11:11) (98% - 100%)                   Daily     Daily         CAPILLARY BLOOD GLUCOS    Coumadin    [ ] YES                         PHYSICAL EXAM  s   No chest pain  No palpatations"  Neurology: alert and oriented x 3, moves all extremities with no defecits  CV :  RRR  LVAD sounds  Lungs:   CTA B/L  Abdomen: soft, nontender, nondistended, positive bowel sounds,   Extremities:       trace pedal edema

## 2021-01-14 NOTE — PROGRESS NOTE ADULT - PROBLEM SELECTOR PLAN 2
CT no source infection / bleeding  S/P PRBC 1/11  daily cbc-   monitor VS  heme consulted for pancytopenia

## 2021-01-14 NOTE — PROGRESS NOTE ADULT - ASSESSMENT
66 y/o M w/ h/o stage D NICM/HFrEF s/p HM2 (as DT 9/17 d/t PAD), TV ring, PAD, prior COVID (4/20) who was readmitted with abdominal pain x 4-5 days in setting of constipation as well as recent initiation of doxycycline for driveline drainage. Labs showed patient to be anemic with H/H of 6.5/22 in setting of INR 2.89. He was transfused with 1uPRBC on 1/11 and responded appropriately. Continues to have febrile episodes (last on 1/12 with Tmax of 101.1). Blood cultures remains NGTD and UA is negative. Was given aggressive bowel regimen today in hopes for resolution of constipation. Hopefully that abdominal pain will resolve once patient has bowel movement.       Assessment- 66 yo man with HM-2 admitted with fevers and mid epigastric abdominal pain  CT of abdomen relatively so unclear if he passed a gall stone or kidney stone or other etiology    Would:   blood cultures no growth   check quantiferon  check urine histo ag.  repeat UA and culture- in case he passed a renal calculus  COVID IGG ab + suggests immunity from prior infeciton in the Spring  strongyloides ab    unclear etiology of his abdominal pain- but also with constipation      Morris Prater MD  [phone]  After 5pm/weekends [phone]

## 2021-01-14 NOTE — PROGRESS NOTE ADULT - PROBLEM SELECTOR PLAN 1
- readmitted with H/H 6.5/22 in the setting of INR 2.89. Was transfused with 1uPRBC on 1/11and responded appropriately   - Fecal Occult negative  - D/c Iron supplements given constipation  - Reticulocyte 1.1 (inapprop response), Haptoglobin wnl  - Hematology consulted and appreciate recommendations  - Pancytopenia is most likely multifactorial, due to slight hemolysis from LVAD and ongoing infection  -  Hep B and HIV, CMV, EBV pending

## 2021-01-14 NOTE — PROGRESS NOTE ADULT - PROBLEM SELECTOR PLAN 3
- Continue with current speed (8800 rpm)  - Resumed Coumadin for anticoagulation (INR goal 2-2.5). Holding ASA at this time for low H/H on admission. Will likely resume as outpatient.   - Continue to trend LDH levels daily.

## 2021-01-14 NOTE — CHART NOTE - NSCHARTNOTEFT_GEN_A_CORE
HEMATOLOGY FELLOW NOTE    Peripheral smear reviewed 1/14: Many fragmented red cells, william cells noted. Very rare schistocytes seen. Consistent with known LVAD.    Will continue to monitor counts.     Rachelle Ramos MD  Hematology/Oncology Fellow, PGY-4  Pager: [phone]  After 5pm and on weekends please page on-call fellow

## 2021-01-14 NOTE — PROGRESS NOTE ADULT - PROBLEM SELECTOR PLAN 1
Management as per heart failure  coumadin 2.6  mg this evening as per LVAD team   daily LDh -   continue current medication regimen as per CHF team

## 2021-01-14 NOTE — PROGRESS NOTE ADULT - PROBLEM SELECTOR PLAN 4
- Remains afebrile. Last febrile episode was 1/12 with Tmax of 101.1  - ID consulted, appreciate recommendations  - Blood cultures NGTD  - UA 1/12 negative. Will recheck UA and UCx in case he passed a renal calculus  - Urine histo ag pending  - Quantiferon pending   - COVID IGG ab pending   - Strongyloides ab pending   - CT C/A/P 1/11 shows no evidence of acute pathology in chest of abdomen

## 2021-01-14 NOTE — PROGRESS NOTE ADULT - ASSESSMENT
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 coli UTIs and COVID-19 (back in April 2020). Overall patient has been progressing well. He was recently seen in clinic where he complained of abdominal pain and constipation. He was started on Doxycycline 1/7/21 due to driveline drainage and labs last week were unremarkable. He presents to [facility] ER stating that for the past 4 days that his abdominal pain has gotten worse, still not having bowel movements despite being on stool softeners and now having lower back pain. Todays labs show patient is anemic with H/H of 6.5/22, fecal occult pending. INR is 2.89. He is febrile with Tmax of 101, saturating on 99% room air, MAP in the 70s. He denies any chest pain, shortness of breath, dizziness, changes in urination or muscle aches.   1/12 INR 3.0  Followed by heart failure team  NPO until GI bleed source ruled out> CT neg  1/13 VSS; INR 2.5 - resume coumadin 1 mg this evening as per LVAD team ; h/h stable 8/29 today; bc neg 1/11 and ua neg; heme consulted today for pancytopenia   continue to observe off abx ; ID following   1/14  INR   1.76  coumadin 2.5 mg,   await heme cslt   HF following

## 2021-01-15 LAB
ANION GAP SERPL CALC-SCNC: 10 MMOL/L — SIGNIFICANT CHANGE UP (ref 5–17)
APTT BLD: 28.2 SEC — SIGNIFICANT CHANGE UP (ref 27.5–35.5)
APTT BLD: 35.2 SEC — SIGNIFICANT CHANGE UP (ref 27.5–35.5)
BUN SERPL-MCNC: 17 MG/DL — SIGNIFICANT CHANGE UP (ref 7–23)
CALCIUM SERPL-MCNC: 8.3 MG/DL — LOW (ref 8.4–10.5)
CHLORIDE SERPL-SCNC: 100 MMOL/L — SIGNIFICANT CHANGE UP (ref 96–108)
CO2 SERPL-SCNC: 25 MMOL/L — SIGNIFICANT CHANGE UP (ref 22–31)
CREAT SERPL-MCNC: 0.99 MG/DL — SIGNIFICANT CHANGE UP (ref 0.5–1.3)
GLUCOSE SERPL-MCNC: 97 MG/DL — SIGNIFICANT CHANGE UP (ref 70–99)
HCT VFR BLD CALC: 24.2 % — LOW (ref 39–50)
HCT VFR BLD CALC: 25.2 % — LOW (ref 39–50)
HGB BLD-MCNC: 7.2 G/DL — LOW (ref 13–17)
HGB BLD-MCNC: 7.7 G/DL — LOW (ref 13–17)
INR BLD: 1.52 RATIO — HIGH (ref 0.88–1.16)
LDH SERPL L TO P-CCNC: 346 U/L — HIGH (ref 50–242)
MCHC RBC-ENTMCNC: 23.5 PG — LOW (ref 27–34)
MCHC RBC-ENTMCNC: 23.9 PG — LOW (ref 27–34)
MCHC RBC-ENTMCNC: 29.8 GM/DL — LOW (ref 32–36)
MCHC RBC-ENTMCNC: 30.6 GM/DL — LOW (ref 32–36)
MCV RBC AUTO: 78.3 FL — LOW (ref 80–100)
MCV RBC AUTO: 79.1 FL — LOW (ref 80–100)
NRBC # BLD: 0 /100 WBCS — SIGNIFICANT CHANGE UP (ref 0–0)
NRBC # BLD: 0 /100 WBCS — SIGNIFICANT CHANGE UP (ref 0–0)
PLATELET # BLD AUTO: 117 K/UL — LOW (ref 150–400)
PLATELET # BLD AUTO: 127 K/UL — LOW (ref 150–400)
POTASSIUM SERPL-MCNC: 4.1 MMOL/L — SIGNIFICANT CHANGE UP (ref 3.5–5.3)
POTASSIUM SERPL-SCNC: 4.1 MMOL/L — SIGNIFICANT CHANGE UP (ref 3.5–5.3)
PROTHROM AB SERPL-ACNC: 17.9 SEC — HIGH (ref 10.6–13.6)
RBC # BLD: 3.06 M/UL — LOW (ref 4.2–5.8)
RBC # BLD: 3.22 M/UL — LOW (ref 4.2–5.8)
RBC # FLD: 17.5 % — HIGH (ref 10.3–14.5)
RBC # FLD: 17.9 % — HIGH (ref 10.3–14.5)
SARS-COV-2 RNA SPEC QL NAA+PROBE: SIGNIFICANT CHANGE UP
SODIUM SERPL-SCNC: 135 MMOL/L — SIGNIFICANT CHANGE UP (ref 135–145)
STRONGYLOIDES AB SER-ACNC: NEGATIVE — SIGNIFICANT CHANGE UP
WBC # BLD: 5.84 K/UL — SIGNIFICANT CHANGE UP (ref 3.8–10.5)
WBC # BLD: 9.23 K/UL — SIGNIFICANT CHANGE UP (ref 3.8–10.5)
WBC # FLD AUTO: 5.84 K/UL — SIGNIFICANT CHANGE UP (ref 3.8–10.5)
WBC # FLD AUTO: 9.23 K/UL — SIGNIFICANT CHANGE UP (ref 3.8–10.5)

## 2021-01-15 PROCEDURE — 99232 SBSQ HOSP IP/OBS MODERATE 35: CPT

## 2021-01-15 PROCEDURE — 93750 INTERROGATION VAD IN PERSON: CPT

## 2021-01-15 PROCEDURE — 99233 SBSQ HOSP IP/OBS HIGH 50: CPT | Mod: 25

## 2021-01-15 RX ORDER — HEPARIN SODIUM 5000 [USP'U]/ML
700 INJECTION INTRAVENOUS; SUBCUTANEOUS
Qty: 25000 | Refills: 0 | Status: DISCONTINUED | OUTPATIENT
Start: 2021-01-15 | End: 2021-01-16

## 2021-01-15 RX ORDER — WARFARIN SODIUM 2.5 MG/1
3 TABLET ORAL ONCE
Refills: 0 | Status: COMPLETED | OUTPATIENT
Start: 2021-01-15 | End: 2021-01-15

## 2021-01-15 RX ORDER — HEPARIN SODIUM 5000 [USP'U]/ML
600 INJECTION INTRAVENOUS; SUBCUTANEOUS
Qty: 25000 | Refills: 0 | Status: DISCONTINUED | OUTPATIENT
Start: 2021-01-15 | End: 2021-01-15

## 2021-01-15 RX ADMIN — SIMETHICONE 80 MILLIGRAM(S): 80 TABLET, CHEWABLE ORAL at 05:34

## 2021-01-15 RX ADMIN — SPIRONOLACTONE 25 MILLIGRAM(S): 25 TABLET, FILM COATED ORAL at 05:34

## 2021-01-15 RX ADMIN — MIRTAZAPINE 7.5 MILLIGRAM(S): 45 TABLET, ORALLY DISINTEGRATING ORAL at 21:03

## 2021-01-15 RX ADMIN — SIMETHICONE 80 MILLIGRAM(S): 80 TABLET, CHEWABLE ORAL at 13:02

## 2021-01-15 RX ADMIN — GABAPENTIN 100 MILLIGRAM(S): 400 CAPSULE ORAL at 21:03

## 2021-01-15 RX ADMIN — SODIUM CHLORIDE 3 MILLILITER(S): 9 INJECTION INTRAMUSCULAR; INTRAVENOUS; SUBCUTANEOUS at 05:34

## 2021-01-15 RX ADMIN — SIMETHICONE 80 MILLIGRAM(S): 80 TABLET, CHEWABLE ORAL at 21:03

## 2021-01-15 RX ADMIN — PREGABALIN 1000 MICROGRAM(S): 225 CAPSULE ORAL at 11:04

## 2021-01-15 RX ADMIN — FINASTERIDE 5 MILLIGRAM(S): 5 TABLET, FILM COATED ORAL at 11:04

## 2021-01-15 RX ADMIN — PANTOPRAZOLE SODIUM 40 MILLIGRAM(S): 20 TABLET, DELAYED RELEASE ORAL at 05:34

## 2021-01-15 RX ADMIN — WARFARIN SODIUM 3 MILLIGRAM(S): 2.5 TABLET ORAL at 21:03

## 2021-01-15 RX ADMIN — Medication 1 MILLIGRAM(S): at 11:04

## 2021-01-15 RX ADMIN — HEPARIN SODIUM 6 UNIT(S)/HR: 5000 INJECTION INTRAVENOUS; SUBCUTANEOUS at 11:04

## 2021-01-15 RX ADMIN — AMIODARONE HYDROCHLORIDE 200 MILLIGRAM(S): 400 TABLET ORAL at 05:34

## 2021-01-15 RX ADMIN — HEPARIN SODIUM 7 UNIT(S)/HR: 5000 INJECTION INTRAVENOUS; SUBCUTANEOUS at 21:04

## 2021-01-15 RX ADMIN — SODIUM CHLORIDE 3 MILLILITER(S): 9 INJECTION INTRAMUSCULAR; INTRAVENOUS; SUBCUTANEOUS at 13:01

## 2021-01-15 RX ADMIN — Medication 25 MILLIGRAM(S): at 05:34

## 2021-01-15 RX ADMIN — SODIUM CHLORIDE 3 MILLILITER(S): 9 INJECTION INTRAMUSCULAR; INTRAVENOUS; SUBCUTANEOUS at 21:04

## 2021-01-15 NOTE — PROGRESS NOTE ADULT - PROBLEM SELECTOR PLAN 5
- Admitted with constipation. Of note was on Miralax and Senna as outpatient. Has been given Mag Citrate x 2 (has had 2 BM since admission)  - Abdominal X-ray 1/11 shows nonobstructive bowel gas pattern with Large stool burden noted throughout the colon. - Admitted with constipation. Of note was on Miralax and Senna as outpatient. Has been given Mag Citrate x 2 (has had 2 BM's since admission)  - Abdominal X-ray 1/11 shows nonobstructive bowel gas pattern with Large stool burden noted throughout the colon.

## 2021-01-15 NOTE — PROGRESS NOTE ADULT - ASSESSMENT
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 coli UTIs and COVID-19 (back in April 2020). Overall patient has been progressing well. He was recently seen in clinic where he complained of abdominal pain and constipation. He was started on Doxycycline 1/7/21 due to driveline drainage and labs last week were unremarkable. He presents to [facility] ER stating that for the past 4 days that his abdominal pain has gotten worse, still not having bowel movements despite being on stool softeners and now having lower back pain. Todays labs show patient is anemic with H/H of 6.5/22, fecal occult pending. INR is 2.89. He is febrile with Tmax of 101, saturating on 99% room air, MAP in the 70s. He denies any chest pain, shortness of breath, dizziness, changes in urination or muscle aches.   1/12 INR 3.0  Followed by heart failure team  NPO until GI bleed source ruled out> CT neg  1/13 VSS; INR 2.5 - resume coumadin 1 mg this evening as per LVAD team ; h/h stable 8/29 today; bc neg 1/11 and ua neg; heme consulted today for pancytopenia   continue to observe off abx ; ID following   1/14  INR   1.76  coumadin 2.5 mg,   await heme cslt   HF following  1/15 INR  1.5  Coumadin 3  mg   Heparin gtt started as bridge

## 2021-01-15 NOTE — PROGRESS NOTE ADULT - PROBLEM SELECTOR PLAN 1
Management as per heart failure  coumadin 3 mg mg this evening as per LVAD team   daily LDH  Start Hep gtt as bridge-   continue current medication regimen as per CHF team

## 2021-01-15 NOTE — PROGRESS NOTE ADULT - ASSESSMENT
64 y/o M w/ h/o stage D NICM/HFrEF s/p HM2 (as DT 9/17 d/t PAD), TV ring, PAD, prior COVID (4/20) who was readmitted with abdominal pain x 4-5 days in setting of constipation as well as recent initiation of doxycycline for driveline drainage. Labs showed patient to be anemic with H/H of 6.5/22 in setting of INR 2.89. He was transfused with 1uPRBC on 1/11 and responded appropriately. Continues to have febrile episodes (last on 1/12 with Tmax of 101.1). Blood cultures remains NGTD and UA is negative. Was given aggressive bowel regimen today in hopes for resolution of constipation. Hopefully that abdominal pain will resolve once patient has bowel movement.       Assessment- 64 yo man with HM-2 admitted with fevers and mid epigastric abdominal pain  CT of abdomen relatively so unclear if he passed a gall stone or kidney stone or other etiology    Would:   blood cultures no growth   check quantiferon  check urine histo ag.  repeat UA and culture- in case he passed a renal calculus  COVID IGG ab + suggests immunity from prior infeciton in the Spring  strongyloides ab    unclear etiology of his abdominal pain- but also with constipation ? diverticular disease      Morris Prater MD  [phone]  After 5pm/weekends [phone]

## 2021-01-15 NOTE — PROGRESS NOTE ADULT - PROBLEM SELECTOR PLAN 4
- Remains afebrile. Last febrile episode was 1/12 with Tmax of 101.1  - ID consulted, appreciate recommendations  - Blood cultures NGTD  - UA 1/12 negative. Will recheck UA and UCx in case he passed a renal calculus  - Urine histo ag pending  - Quantiferon negative   - COVID IGG ab positive, hx of COVID in April 2020  - Strongyloides ab pending   - CT C/A/P 1/11 shows no evidence of acute pathology in chest of abdomen

## 2021-01-15 NOTE — PROGRESS NOTE ADULT - PROBLEM SELECTOR PLAN 3
- Continue with current speed (8800 rpm)  - Placed on Heparin gtt for subtherapeutic INR (PTT goal 50-60)  - Continue Coumadin for anticoagulation (INR goal 2-2.5). Holding ASA at this time for low H/H on admission, will resume when feasible.   - Continue to trend LDH levels daily. - Continue with current speed (8800 rpm)  - Placed on Heparin gtt for subtherapeutic INR today (PTT goal 50-60)  - Continue Coumadin for anticoagulation (INR goal 2-2.5). Holding ASA at this time for low H/H on admission, will resume when feasible.   - Continue to trend LDH levels daily.

## 2021-01-15 NOTE — PROGRESS NOTE ADULT - SUBJECTIVE AND OBJECTIVE BOX
INFECTIOUS DISEASES FOLLOW UP-- Anitra Prater  [phone]    This is a follow up note for this  65yMale with  Anemia  abdominal pain improved but not resolved        ROS:  CONSTITUTIONAL:  No fever, good appetite  CARDIOVASCULAR:  No chest pain or palpitations  RESPIRATORY:  No dyspnea  GASTROINTESTINAL:  No nausea, vomiting, diarrhea, still with abdominal pain  GENITOURINARY:  No dysuria  NEUROLOGIC:  No headache,     Allergies    No Known Allergies    Intolerances        ANTIBIOTICS/RELEVANT:  antimicrobials    immunologic:  influenza   Vaccine 0.5 milliLiter(s) IntraMuscular once    OTHER:  acetaminophen   Tablet .. 650 milliGRAM(s) Oral every 6 hours PRN  aMIOdarone    Tablet 200 milliGRAM(s) Oral daily  cyanocobalamin 1000 MICROGram(s) Oral daily  finasteride 5 milliGRAM(s) Oral daily  folic acid 1 milliGRAM(s) Oral daily  gabapentin 100 milliGRAM(s) Oral at bedtime  heparin  Infusion 700 Unit(s)/Hr IV Continuous <Continuous>  metoprolol succinate ER 25 milliGRAM(s) Oral daily  mirtazapine 7.5 milliGRAM(s) Oral at bedtime  pantoprazole    Tablet 40 milliGRAM(s) Oral before breakfast  polyethylene glycol 3350 17 Gram(s) Oral two times a day  simethicone 80 milliGRAM(s) Chew three times a day  sodium chloride 0.9% lock flush 3 milliLiter(s) IV Push every 8 hours  spironolactone 25 milliGRAM(s) Oral daily      Objective:  Vital Signs Last 24 Hrs  T(C): 36.9 (15 Norman 2021 19:58), Max: 36.9 (15 Norman 2021 15:00)  T(F): 98.4 (15 Norman 2021 19:58), Max: 98.4 (15 Norman 2021 15:00)  HR: 74 (15 Norman 2021 19:58) (70 - 74)  BP: --  BP(mean): 88 (15 Norman 2021 19:58) (82 - 88)  RR: 16 (15 Norman 2021 19:58) (16 - 18)  SpO2: 99% (15 Norman 2021 19:58) (98% - 100%)    PHYSICAL EXAM:  Constitutional:no acute distress  Eyes:ALONSO, EOMI  Ear/Nose/Throat: no oral lesions, 	  Respiratory: clear BL  Cardiovascular: S1S2  Gastrointestinal:soft, (+) BS, no tenderness slight distensions and left sided abdominal pain  Extremities:no e/e/c  No Lymphadenopathy  IV sites not inflammed.    LABS:                        7.2    9.23  )-----------( 127      ( 15 Norman 2021 19:20 )             24.2     01-15    135  |  100  |  17  ----------------------------<  97  4.1   |  25  |  0.99    Ca    8.3<L>      15 Norman 2021 05:35      PT/INR - ( 15 Norman 2021 05:35 )   PT: 17.9 sec;   INR: 1.52 ratio         PTT - ( 15 Norman 2021 19:20 )  PTT:35.2 sec      MICROBIOLOGY:            RECENT CULTURES:  01-11 @ 16:36  .Blood Blood  --  --  --    No growth to date.  --      RADIOLOGY & ADDITIONAL STUDIES:    < from: Xray Abdomen 1 View PORTABLE -Urgent (Xray Abdomen 1 View PORTABLE -Urgent .) (01.14.21 @ 10:52) >  Left ventricular assist device is present. There is no bowel obstruction. Less fecal residue is noted in the colon.    There is no free air or abnormal air fluid levels.  There is no unexpected radiopaque foreign body or calcification.    The osseous structures are unremarkable.    IMPRESSION: Nonobstructive bowel gas pattern.    < end of copied text >

## 2021-01-15 NOTE — PROGRESS NOTE ADULT - PROBLEM SELECTOR PLAN 1
- readmitted with H/H 6.5/22 in the setting of INR 2.89. Was transfused with 1uPRBC on 1/11and responded appropriately   - Fecal Occult negative  - D/c Iron supplements given constipation  - Reticulocyte 1.1 (inapprop response), Haptoglobin wnl  - Hematology consulted and appreciate recommendations  - Pancytopenia is most likely multifactorial, due to slight hemolysis from LVAD and ongoing infection  - Hep B core and surface antibody both positive   - HIV, CMV, EBV pending - readmitted with H/H 6.5/22 in the setting of INR 2.89. Was transfused with 1uPRBC on 1/11and did responded appropriately however H/H continues to slowly downtrend. Continue to monitor and transfuse as needed   - Fecal Occult negative  - D/c Iron supplements given constipation  - Reticulocyte 1.1 (inapprop response), Haptoglobin wnl  - Hematology consulted and appreciate recommendations  - Pancytopenia is most likely multifactorial, due to slight hemolysis from LVAD and ongoing infection   - HIV, CMV, EBV, SPEP, UPEP pending

## 2021-01-15 NOTE — PROGRESS NOTE ADULT - ASSESSMENT
64 y/o M w/ h/o stage D NICM/HFrEF s/p HM2 (as DT 9/17 d/t PAD), TV ring, PAD, prior COVID (4/20) who was readmitted with abdominal pain x 4-5 days in setting of constipation as well as recent initiation of doxycycline for driveline drainage. Labs showed patient to be anemic with H/H of 6.5/22 in setting of INR 2.89 although guaiac negative and labs not notable for hemolysis. He was transfused with 1uPRBC on 1/11 and responded appropriately however now H/H continues to slowly downtrend. Had febrile episodes (last on 1/12 with Tmax of 101.1). Blood cultures remain NGTD and UA is negative. He has developed pancytopenia which is most likely multifactorial ( may be due to slight hemolysis from LVAD and concerns for ongoing infection).  Medications have been reviewed and no changes made. Peripheral smear was completed, consistent with known LVAD. At this time no signs of LVAD dysfunction or alarms noted at this time.

## 2021-01-15 NOTE — PROGRESS NOTE ADULT - SUBJECTIVE AND OBJECTIVE BOX
HPI:  65M PMH ACC/AHA stage D HF due to NICM HM2 LVAD , TV annuloplasty ring 17 as destination therapy due to severe peripheral artery disease with significant stenosis  SIADH, Depression, CKD-3 with hyperkalemia, past E. coli UTIs and COVID-19 (back in 2020). Overall patient has been progressing well. He was recently seen in clinic where he complained of abdominal pain and constipation. He was started on Doxycycline 21 due to driveline drainage and labs last week were unremarkable. He presents to [facility] ER stating that for the past 4 days that his abdominal pain has gotten worse, still not having bowel movements despite being on stool softeners and now having lower back pain. Todays labs show patient is anemic with H/H of 6.5/, fecal occult pending. INR is 2.89. He is febrile with Tmax of 101, saturating on 99% room air, MAP in the 70s. He denies any chest pain, shortness of breath, dizziness, changes in urination or muscle aches.      (2021 12:48)      MEDICATIONS  (STANDING):  aMIOdarone    Tablet 200 milliGRAM(s) Oral daily  cyanocobalamin 1000 MICROGram(s) Oral daily  finasteride 5 milliGRAM(s) Oral daily  folic acid 1 milliGRAM(s) Oral daily  gabapentin 100 milliGRAM(s) Oral at bedtime  heparin  Infusion 600 Unit(s)/Hr (6 mL/Hr) IV Continuous <Continuous>  influenza   Vaccine 0.5 milliLiter(s) IntraMuscular once  metoprolol succinate ER 25 milliGRAM(s) Oral daily  mirtazapine 7.5 milliGRAM(s) Oral at bedtime  pantoprazole    Tablet 40 milliGRAM(s) Oral before breakfast  polyethylene glycol 3350 17 Gram(s) Oral two times a day  simethicone 80 milliGRAM(s) Chew three times a day  sodium chloride 0.9% lock flush 3 milliLiter(s) IV Push every 8 hours  spironolactone 25 milliGRAM(s) Oral daily  warfarin 3 milliGRAM(s) Oral once    MEDICATIONS  (PRN):  acetaminophen   Tablet .. 650 milliGRAM(s) Oral every 6 hours PRN Temp greater or equal to 38C (100.4F), Mild Pain (1 - 3)      I&O's Summary    2021 07:01  -  15 Norman 2021 07:00  --------------------------------------------------------  IN: 480 mL / OUT: 0 mL / NET: 480 mL                              7.7    5.84  )-----------( 117      ( 15 Norman 2021 05:35 )             25.2       -15    135  |  100  |  17  ----------------------------<  97  4.1   |  25  |  0.99    Ca    8.3<L>      15 Norman 2021 05:35      Physical Exam:     General: No distress. Comfortable.    HEENT: EOM intact.    Neck: Neck supple. JVP not elevated. No masses    Chest: Clear to auscultation bilaterally    CV: +VAD hum    Abdomen: Soft, non-distended, noted to have some tenderness in LLQ and LUQ    Skin: No rashes or skin breakdown    Neurology: Alert and oriented times three. Sensation intact  Psych: Affect normal      LVAD Interrogation  VAD TYPE HM 2  Speed 8800  Flow 5  Power 4.9  PI  6.3  Assessment of driveline exit site: driveline dressing c/d/i  No programming changes were made   I&O's Summary    2021 07:01  -  15 Norman 2021 07:00  --------------------------------------------------------  IN: 480 mL / OUT: 0 mL / NET: 480 mL      Daily     Daily Weight in k.7 (15 Norman 2021 07:29)

## 2021-01-15 NOTE — PROGRESS NOTE ADULT - SUBJECTIVE AND OBJECTIVE BOX
Subjective: Patient seen and examined resting in bed. Had 1 BM on 1/14. Complains of left sided abdominal pain     Medications:  acetaminophen   Tablet .. 650 milliGRAM(s) Oral every 6 hours PRN  aMIOdarone    Tablet 200 milliGRAM(s) Oral daily  cyanocobalamin 1000 MICROGram(s) Oral daily  finasteride 5 milliGRAM(s) Oral daily  folic acid 1 milliGRAM(s) Oral daily  gabapentin 100 milliGRAM(s) Oral at bedtime  influenza   Vaccine 0.5 milliLiter(s) IntraMuscular once  metoprolol succinate ER 25 milliGRAM(s) Oral daily  mirtazapine 7.5 milliGRAM(s) Oral at bedtime  pantoprazole    Tablet 40 milliGRAM(s) Oral before breakfast  polyethylene glycol 3350 17 Gram(s) Oral two times a day  simethicone 80 milliGRAM(s) Chew three times a day  sodium chloride 0.9% lock flush 3 milliLiter(s) IV Push every 8 hours  spironolactone 25 milliGRAM(s) Oral daily    Vitals:  Vital Signs Last 24 Hours  T(C): 36.7 (01-15-21 @ 05:35), Max: 36.7 (01-14-21 @ 11:11)  HR: 72 (01-15-21 @ 05:35) (69 - 91)  BP: --  RR: 18 (01-15-21 @ 05:35) (16 - 18)  SpO2: 98% (01-15-21 @ 05:35) (98% - 100%)        I&O's Summary    14 Jan 2021 07:01  -  15 Norman 2021 07:00  --------------------------------------------------------  IN: 480 mL / OUT: 0 mL / NET: 480 mL      Physical Exam  General: No distress. Comfortable.  HEENT: EOM intact.  Neck: Neck supple. JVP not elevated. No masses  Chest: Clear to auscultation bilaterally  CV: +VAD hum  Abdomen: Soft, non-distended, noted to have some tenderness in LLQ and LUQ  Skin: No rashes or skin breakdown  Neurology: Alert and oriented times three. Sensation intact  Psych: Affect normal    LVAD Interrogation  VAD TYPE HM 2  Speed 8800  Flow 5  Power 4.9  PI  6.3  Assessment of driveline exit site: driveline dressing c/d/i  Programming changes: no changes made    Labs:                        7.7    5.84  )-----------( 117      ( 15 Norman 2021 05:35 )             25.2     01-15    135  |  100  |  17  ----------------------------<  97  4.1   |  25  |  0.99    Ca    8.3<L>      15 Norman 2021 05:35      PT/INR - ( 15 Norman 2021 05:35 )   PT: 17.9 sec;   INR: 1.52 ratio         PTT - ( 15 Norman 2021 05:35 )  PTT:28.2 sec      Serum Pro-Brain Natriuretic Peptide: 315 pg/mL (01-11 @ 10:34)      Lactate Dehydrogenase, Serum: 346 U/L (01-15 @ 05:35)  Lactate Dehydrogenase, Serum: 389 U/L (01-14 @ 05:42)  Lactate Dehydrogenase, Serum: 439 U/L (01-13 @ 05:52)      Imaging Studies     < from: Xray Abdomen 1 View PORTABLE -Urgent (Xray Abdomen 1 View PORTABLE -Urgent .) (01.14.21 @ 10:52) >  IMPRESSION: Nonobstructive bowel gas pattern.  < end of copied text >

## 2021-01-16 DIAGNOSIS — R10.9 UNSPECIFIED ABDOMINAL PAIN: ICD-10-CM

## 2021-01-16 LAB
-  CUTIBACTERIUM ACNES: SIGNIFICANT CHANGE UP
ALBUMIN SERPL ELPH-MCNC: 3.5 G/DL — SIGNIFICANT CHANGE UP (ref 3.3–5)
ALP SERPL-CCNC: 70 U/L — SIGNIFICANT CHANGE UP (ref 40–120)
ALT FLD-CCNC: 19 U/L — SIGNIFICANT CHANGE UP (ref 10–45)
ANION GAP SERPL CALC-SCNC: 11 MMOL/L — SIGNIFICANT CHANGE UP (ref 5–17)
ANION GAP SERPL CALC-SCNC: 12 MMOL/L — SIGNIFICANT CHANGE UP (ref 5–17)
APPEARANCE UR: CLEAR — SIGNIFICANT CHANGE UP
APTT BLD: 47.6 SEC — HIGH (ref 27.5–35.5)
APTT BLD: 49.1 SEC — HIGH (ref 27.5–35.5)
APTT BLD: 50.5 SEC — HIGH (ref 27.5–35.5)
APTT BLD: 52.4 SEC — HIGH (ref 27.5–35.5)
AST SERPL-CCNC: 31 U/L — SIGNIFICANT CHANGE UP (ref 10–40)
BILIRUB SERPL-MCNC: 0.2 MG/DL — SIGNIFICANT CHANGE UP (ref 0.2–1.2)
BILIRUB UR-MCNC: NEGATIVE — SIGNIFICANT CHANGE UP
BUN SERPL-MCNC: 14 MG/DL — SIGNIFICANT CHANGE UP (ref 7–23)
BUN SERPL-MCNC: 14 MG/DL — SIGNIFICANT CHANGE UP (ref 7–23)
CALCIUM SERPL-MCNC: 8.4 MG/DL — SIGNIFICANT CHANGE UP (ref 8.4–10.5)
CALCIUM SERPL-MCNC: 8.5 MG/DL — SIGNIFICANT CHANGE UP (ref 8.4–10.5)
CHLORIDE SERPL-SCNC: 100 MMOL/L — SIGNIFICANT CHANGE UP (ref 96–108)
CHLORIDE SERPL-SCNC: 100 MMOL/L — SIGNIFICANT CHANGE UP (ref 96–108)
CO2 SERPL-SCNC: 22 MMOL/L — SIGNIFICANT CHANGE UP (ref 22–31)
CO2 SERPL-SCNC: 24 MMOL/L — SIGNIFICANT CHANGE UP (ref 22–31)
COLOR SPEC: SIGNIFICANT CHANGE UP
CREAT SERPL-MCNC: 1.03 MG/DL — SIGNIFICANT CHANGE UP (ref 0.5–1.3)
CREAT SERPL-MCNC: 1.05 MG/DL — SIGNIFICANT CHANGE UP (ref 0.5–1.3)
CULTURE RESULTS: SIGNIFICANT CHANGE UP
DIFF PNL FLD: NEGATIVE — SIGNIFICANT CHANGE UP
EBV DNA SERPL NAA+PROBE-ACNC: SIGNIFICANT CHANGE UP IU/ML
GLUCOSE SERPL-MCNC: 84 MG/DL — SIGNIFICANT CHANGE UP (ref 70–99)
GLUCOSE SERPL-MCNC: 86 MG/DL — SIGNIFICANT CHANGE UP (ref 70–99)
GLUCOSE UR QL: NEGATIVE — SIGNIFICANT CHANGE UP
GRAM STN FLD: SIGNIFICANT CHANGE UP
HCT VFR BLD CALC: 23.2 % — LOW (ref 39–50)
HCT VFR BLD CALC: 25.7 % — LOW (ref 39–50)
HCT VFR BLD CALC: 26.3 % — LOW (ref 39–50)
HCT VFR BLD CALC: 27.5 % — LOW (ref 39–50)
HGB BLD-MCNC: 7 G/DL — CRITICAL LOW (ref 13–17)
HGB BLD-MCNC: 7.6 G/DL — LOW (ref 13–17)
HGB BLD-MCNC: 8 G/DL — LOW (ref 13–17)
HGB BLD-MCNC: 8.3 G/DL — LOW (ref 13–17)
INR BLD: 1.58 RATIO — HIGH (ref 0.88–1.16)
KETONES UR-MCNC: NEGATIVE — SIGNIFICANT CHANGE UP
LDH SERPL L TO P-CCNC: 408 U/L — HIGH (ref 50–242)
LEUKOCYTE ESTERASE UR-ACNC: NEGATIVE — SIGNIFICANT CHANGE UP
MCHC RBC-ENTMCNC: 23.4 PG — LOW (ref 27–34)
MCHC RBC-ENTMCNC: 23.9 PG — LOW (ref 27–34)
MCHC RBC-ENTMCNC: 23.9 PG — LOW (ref 27–34)
MCHC RBC-ENTMCNC: 24 PG — LOW (ref 27–34)
MCHC RBC-ENTMCNC: 29.6 GM/DL — LOW (ref 32–36)
MCHC RBC-ENTMCNC: 30.2 GM/DL — LOW (ref 32–36)
MCHC RBC-ENTMCNC: 30.2 GM/DL — LOW (ref 32–36)
MCHC RBC-ENTMCNC: 30.4 GM/DL — LOW (ref 32–36)
MCV RBC AUTO: 78.5 FL — LOW (ref 80–100)
MCV RBC AUTO: 79 FL — LOW (ref 80–100)
MCV RBC AUTO: 79.1 FL — LOW (ref 80–100)
MCV RBC AUTO: 79.5 FL — LOW (ref 80–100)
METHOD TYPE: SIGNIFICANT CHANGE UP
NITRITE UR-MCNC: NEGATIVE — SIGNIFICANT CHANGE UP
NRBC # BLD: 0 /100 WBCS — SIGNIFICANT CHANGE UP (ref 0–0)
PH UR: 7.5 — SIGNIFICANT CHANGE UP (ref 5–8)
PLATELET # BLD AUTO: 124 K/UL — LOW (ref 150–400)
PLATELET # BLD AUTO: 125 K/UL — LOW (ref 150–400)
PLATELET # BLD AUTO: 155 K/UL — SIGNIFICANT CHANGE UP (ref 150–400)
PLATELET # BLD AUTO: 179 K/UL — SIGNIFICANT CHANGE UP (ref 150–400)
POTASSIUM SERPL-MCNC: 4.3 MMOL/L — SIGNIFICANT CHANGE UP (ref 3.5–5.3)
POTASSIUM SERPL-MCNC: 4.4 MMOL/L — SIGNIFICANT CHANGE UP (ref 3.5–5.3)
POTASSIUM SERPL-SCNC: 4.3 MMOL/L — SIGNIFICANT CHANGE UP (ref 3.5–5.3)
POTASSIUM SERPL-SCNC: 4.4 MMOL/L — SIGNIFICANT CHANGE UP (ref 3.5–5.3)
PROT SERPL-MCNC: 7.3 G/DL — SIGNIFICANT CHANGE UP (ref 6–8.3)
PROT UR-MCNC: SIGNIFICANT CHANGE UP
PROTHROM AB SERPL-ACNC: 18.6 SEC — HIGH (ref 10.6–13.6)
RBC # BLD: 2.92 M/UL — LOW (ref 4.2–5.8)
RBC # BLD: 3.25 M/UL — LOW (ref 4.2–5.8)
RBC # BLD: 3.35 M/UL — LOW (ref 4.2–5.8)
RBC # BLD: 3.48 M/UL — LOW (ref 4.2–5.8)
RBC # FLD: 17.7 % — HIGH (ref 10.3–14.5)
RBC # FLD: 17.8 % — HIGH (ref 10.3–14.5)
SODIUM SERPL-SCNC: 134 MMOL/L — LOW (ref 135–145)
SODIUM SERPL-SCNC: 135 MMOL/L — SIGNIFICANT CHANGE UP (ref 135–145)
SP GR SPEC: 1.02 — SIGNIFICANT CHANGE UP (ref 1.01–1.02)
SPECIMEN SOURCE: SIGNIFICANT CHANGE UP
UROBILINOGEN FLD QL: NEGATIVE — SIGNIFICANT CHANGE UP
WBC # BLD: 10.28 K/UL — SIGNIFICANT CHANGE UP (ref 3.8–10.5)
WBC # BLD: 8.67 K/UL — SIGNIFICANT CHANGE UP (ref 3.8–10.5)
WBC # BLD: 9.05 K/UL — SIGNIFICANT CHANGE UP (ref 3.8–10.5)
WBC # BLD: 9.66 K/UL — SIGNIFICANT CHANGE UP (ref 3.8–10.5)
WBC # FLD AUTO: 10.28 K/UL — SIGNIFICANT CHANGE UP (ref 3.8–10.5)
WBC # FLD AUTO: 8.67 K/UL — SIGNIFICANT CHANGE UP (ref 3.8–10.5)
WBC # FLD AUTO: 9.05 K/UL — SIGNIFICANT CHANGE UP (ref 3.8–10.5)
WBC # FLD AUTO: 9.66 K/UL — SIGNIFICANT CHANGE UP (ref 3.8–10.5)

## 2021-01-16 PROCEDURE — 99232 SBSQ HOSP IP/OBS MODERATE 35: CPT

## 2021-01-16 RX ORDER — SENNA PLUS 8.6 MG/1
2 TABLET ORAL AT BEDTIME
Refills: 0 | Status: DISCONTINUED | OUTPATIENT
Start: 2021-01-16 | End: 2021-01-20

## 2021-01-16 RX ORDER — WARFARIN SODIUM 2.5 MG/1
3 TABLET ORAL ONCE
Refills: 0 | Status: COMPLETED | OUTPATIENT
Start: 2021-01-16 | End: 2021-01-16

## 2021-01-16 RX ORDER — CEFTRIAXONE 500 MG/1
2000 INJECTION, POWDER, FOR SOLUTION INTRAMUSCULAR; INTRAVENOUS EVERY 12 HOURS
Refills: 0 | Status: DISCONTINUED | OUTPATIENT
Start: 2021-01-16 | End: 2021-01-19

## 2021-01-16 RX ORDER — PANTOPRAZOLE SODIUM 20 MG/1
40 TABLET, DELAYED RELEASE ORAL EVERY 12 HOURS
Refills: 0 | Status: DISCONTINUED | OUTPATIENT
Start: 2021-01-16 | End: 2021-01-20

## 2021-01-16 RX ORDER — HEPARIN SODIUM 5000 [USP'U]/ML
700 INJECTION INTRAVENOUS; SUBCUTANEOUS
Qty: 25000 | Refills: 0 | Status: DISCONTINUED | OUTPATIENT
Start: 2021-01-16 | End: 2021-01-19

## 2021-01-16 RX ADMIN — FINASTERIDE 5 MILLIGRAM(S): 5 TABLET, FILM COATED ORAL at 11:22

## 2021-01-16 RX ADMIN — Medication 1 MILLIGRAM(S): at 11:21

## 2021-01-16 RX ADMIN — Medication 25 MILLIGRAM(S): at 05:30

## 2021-01-16 RX ADMIN — SIMETHICONE 80 MILLIGRAM(S): 80 TABLET, CHEWABLE ORAL at 21:16

## 2021-01-16 RX ADMIN — HEPARIN SODIUM 7 UNIT(S)/HR: 5000 INJECTION INTRAVENOUS; SUBCUTANEOUS at 19:45

## 2021-01-16 RX ADMIN — HEPARIN SODIUM 7 UNIT(S)/HR: 5000 INJECTION INTRAVENOUS; SUBCUTANEOUS at 05:29

## 2021-01-16 RX ADMIN — CEFTRIAXONE 100 MILLIGRAM(S): 500 INJECTION, POWDER, FOR SOLUTION INTRAMUSCULAR; INTRAVENOUS at 22:47

## 2021-01-16 RX ADMIN — SODIUM CHLORIDE 3 MILLILITER(S): 9 INJECTION INTRAMUSCULAR; INTRAVENOUS; SUBCUTANEOUS at 05:31

## 2021-01-16 RX ADMIN — PANTOPRAZOLE SODIUM 40 MILLIGRAM(S): 20 TABLET, DELAYED RELEASE ORAL at 05:30

## 2021-01-16 RX ADMIN — SODIUM CHLORIDE 3 MILLILITER(S): 9 INJECTION INTRAMUSCULAR; INTRAVENOUS; SUBCUTANEOUS at 21:17

## 2021-01-16 RX ADMIN — GABAPENTIN 100 MILLIGRAM(S): 400 CAPSULE ORAL at 21:16

## 2021-01-16 RX ADMIN — MIRTAZAPINE 7.5 MILLIGRAM(S): 45 TABLET, ORALLY DISINTEGRATING ORAL at 21:16

## 2021-01-16 RX ADMIN — SPIRONOLACTONE 25 MILLIGRAM(S): 25 TABLET, FILM COATED ORAL at 05:30

## 2021-01-16 RX ADMIN — HEPARIN SODIUM 7 UNIT(S)/HR: 5000 INJECTION INTRAVENOUS; SUBCUTANEOUS at 11:21

## 2021-01-16 RX ADMIN — AMIODARONE HYDROCHLORIDE 200 MILLIGRAM(S): 400 TABLET ORAL at 05:30

## 2021-01-16 RX ADMIN — SENNA PLUS 2 TABLET(S): 8.6 TABLET ORAL at 22:46

## 2021-01-16 RX ADMIN — SIMETHICONE 80 MILLIGRAM(S): 80 TABLET, CHEWABLE ORAL at 15:07

## 2021-01-16 RX ADMIN — PANTOPRAZOLE SODIUM 40 MILLIGRAM(S): 20 TABLET, DELAYED RELEASE ORAL at 22:47

## 2021-01-16 RX ADMIN — PREGABALIN 1000 MICROGRAM(S): 225 CAPSULE ORAL at 11:21

## 2021-01-16 RX ADMIN — SIMETHICONE 80 MILLIGRAM(S): 80 TABLET, CHEWABLE ORAL at 05:30

## 2021-01-16 RX ADMIN — SODIUM CHLORIDE 3 MILLILITER(S): 9 INJECTION INTRAMUSCULAR; INTRAVENOUS; SUBCUTANEOUS at 13:27

## 2021-01-16 RX ADMIN — WARFARIN SODIUM 3 MILLIGRAM(S): 2.5 TABLET ORAL at 21:16

## 2021-01-16 NOTE — PROGRESS NOTE ADULT - PROBLEM SELECTOR PLAN 2
Heme following - results of peripheral smear from 1/14 c/w known LVAD, further recommendations pending   H/H 8/26.3 --> 7/23.2 today 1/16 - f/u rpt CBC as per Dr. Son   S/P PRBC 1/11  Daily CBC  Monitor VS

## 2021-01-16 NOTE — PROGRESS NOTE ADULT - ASSESSMENT
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 coli UTIs and COVID-19 (back in April 2020). Overall patient has been progressing well. He was recently seen in clinic where he complained of abdominal pain and constipation. He was started on Doxycycline 1/7/21 due to driveline drainage and labs last week were unremarkable. He presents to [facility] ER stating that for the past 4 days that his abdominal pain has gotten worse, still not having bowel movements despite being on stool softeners and now having lower back pain. Todays labs show patient is anemic with H/H of 6.5/22, fecal occult pending. INR is 2.89. He is febrile with Tmax of 101, saturating on 99% room air, MAP in the 70s. He denies any chest pain, shortness of breath, dizziness, changes in urination or muscle aches.   1/12 INR 3.0  Followed by heart failure team  NPO until GI bleed source ruled out> CT neg  1/13 VSS; INR 2.5 - resume coumadin 1 mg this evening as per LVAD team ; h/h stable 8/29 today; bc neg 1/11 and ua neg; heme consulted today for pancytopenia   continue to observe off abx ; ID following   1/14  INR   1.76  coumadin 2.5 mg,   await heme cslt   HF following  1/15 INR  1.5  Coumadin 3  mg   Heparin gtt started as bridge  1/16 VSS, NAD. INR 1.58, will dose 3 mg Coumadin tonight and continue heparin gtt as bridge. H/H this AM dropped from 8/26.3 --> 7/23.2 - pt asx, reports dark BM yesterday - will repeat CBC in afternoon as per Dr. Son. BCx from 1/11 now (+) for cultebacterium acnes - pt has been afebrile since 1/12, ID following. Will repeat BCx x 2 as per Dr. Son and f/u w/ ID. As pt reports having a dark BM yesterday and notes continued L sided abd pain - will repeat stool guaiac and place GI c/s per Dr. Son given concern for GIB and/or diverticular disease. SPEP, UPEP and haptoglobin pending. Awaiting further heme recommendations.

## 2021-01-16 NOTE — CONSULT NOTE ADULT - SUBJECTIVE AND OBJECTIVE BOX
Chief Complaint:  Patient is a 65y old  Male who presents with a chief complaint of fevers, anemia, abdominal pain and constipation (15 Norman 2021 23:00)      HPI: 65M PMH ACC/AHA stage D HF due to NICM HM2 LVAD , TV annuloplasty ring 17 as destination therapy due to severe peripheral artery disease with significant stenosis  SIADH, Depression, CKD-3 with hyperkalemia, past E. coli UTIs and COVID-19 (back in 2020). Overall patient has been progressing well. He was recently seen in clinic where he complained of abdominal pain and constipation. He was started on Doxycycline 21 due to driveline drainage and labs last week were unremarkable. He presents to [facility] ER stating that for the past 4 days that his abdominal pain has gotten worse, still not having bowel movements despite being on stool softeners and now having lower back pain. Todays labs show patient is anemic with H/H of 6.5/22, fecal occult pending. INR is 2.89. He is febrile with Tmax of 101, saturating on 99% room air, MAP in the 70s. He denies any chest pain, shortness of breath, dizziness, changes in urination or muscle aches.    Allergies:  No Known Allergies      Home Medications:    Hospital Medications:  acetaminophen   Tablet .. 650 milliGRAM(s) Oral every 6 hours PRN  aMIOdarone    Tablet 200 milliGRAM(s) Oral daily  cyanocobalamin 1000 MICROGram(s) Oral daily  finasteride 5 milliGRAM(s) Oral daily  folic acid 1 milliGRAM(s) Oral daily  gabapentin 100 milliGRAM(s) Oral at bedtime  heparin  Infusion 700 Unit(s)/Hr IV Continuous <Continuous>  influenza   Vaccine 0.5 milliLiter(s) IntraMuscular once  metoprolol succinate ER 25 milliGRAM(s) Oral daily  mirtazapine 7.5 milliGRAM(s) Oral at bedtime  pantoprazole    Tablet 40 milliGRAM(s) Oral before breakfast  polyethylene glycol 3350 17 Gram(s) Oral two times a day  simethicone 80 milliGRAM(s) Chew three times a day  sodium chloride 0.9% lock flush 3 milliLiter(s) IV Push every 8 hours  spironolactone 25 milliGRAM(s) Oral daily  warfarin 3 milliGRAM(s) Oral once      PMHX/PSHX:  GI bleed    Vertigo    H/O epistaxis    Iron deficiency anemia    CKD (chronic kidney disease), stage III    Clavicle fracture    Falls    Anticoagulation goal of INR 2.0 to 2.5    ACC/AHA stage D systolic heart failure    BPH with urinary obstruction    Claudication    Peripheral arterial disease    Bleeding hemorrhoids    Hemorrhoids    History of 2019 novel coronavirus disease (COVID-19)    Pleural effusion    Depression    CAD (coronary artery disease)    CHF (congestive heart failure)    No pertinent past medical history    S/P endoscopy    S/P ventricular assist device    S/P TVR (tricuspid valve repair)    No significant past surgical history        Family history:  No pertinent family history in first degree relatives        Social History:     ROS:     General:  No weight loss, fevers, chills, night sweats, fatigue  Eyes:  No vision changes, no yellowing of eyes   ENT:  No throat pain, runny nose  CV:  No chest pain, palpitations  Resp:  No SOB, cough, wheezing  GI:  See HPI  :  No burning with urination, no hematuria   Muscle:  No muscle pain, weakness  Neuro:  No numbness/tingling, memory problems  Psych:  No fatigue, insomnia, mood problems  Heme:  No easy bruisability  Skin:  No rash, itching       PHYSICAL EXAM:     GENERAL:  Appears stated age, well-groomed, well-nourished, no distress  HEENT:  NC/AT,  conjunctivae clear and pink,  no JVD  CHEST:  Full & symmetric excursion, no increased effort, breath sounds clear  HEART:  Regular rhythm, S1, S2, no murmur/rub/S3/S4, no abdominal bruit, no edema  ABDOMEN:  Soft, non-tender, non-distended, normoactive bowel sounds,  no masses ,  EXTREMITIES:  no cyanosis,clubbing or edema  SKIN:  No rash/erythema/ecchymoses/petechiae/wounds/abscess/warm/dry  NEURO:  Alert, oriented    Vital Signs:  Vital Signs Last 24 Hrs  T(C): 36.3 (2021 15:12), Max: 37 (2021 11:18)  T(F): 97.4 (2021 15:12), Max: 98.6 (2021 11:18)  HR: 71 (2021 15:12) (67 - 75)  BP: --  BP(mean): 86 (2021 15:12) (82 - 90)  RR: 16 (2021 15:12) (16 - 16)  SpO2: 99% (2021 15:12) (97% - 99%)  Daily     Daily Weight in k.5 (2021 07:57)    LABS:                        7.6    9.05  )-----------( 155      ( 2021 10:43 )             25.7     01-16    134<L>  |  100  |  14  ----------------------------<  84  4.3   |  22  |  1.03    Ca    8.4      2021 06:50    TPro  7.3  /  Alb  3.5  /  TBili  0.2  /  DBili  x   /  AST  31  /  ALT  19  /  AlkPhos  70  01-16    LIVER FUNCTIONS - ( 2021 06:50 )  Alb: 3.5 g/dL / Pro: 7.3 g/dL / ALK PHOS: 70 U/L / ALT: 19 U/L / AST: 31 U/L / GGT: x           PT/INR - ( 2021 06:50 )   PT: 18.6 sec;   INR: 1.58 ratio         PTT - ( 2021 10:43 )  PTT:50.5 sec        Imaging:             Chief Complaint:  Patient is a 65y old  Male who presents with a chief complaint of fevers, anemia, abdominal pain and constipation (15 Norman 2021 23:00)      HPI: 65M PMH ACC/AHA stage D HF due to NICM HM2 LVAD , TV annuloplasty ring 17 as destination therapy due to severe peripheral artery disease with significant stenosis  SIADH, Depression, CKD-3 with hyperkalemia, past E. coli UTIs and COVID-19 (back in 2020). He was recently started on Doxycycline 21 due to driveline drainage and labs in outpatient setting were unremarkable. He presented to [facility] ER on  stating that for the past 4 days that his abdominal pain has gotten worse, still not having bowel movements despite being on stool softeners and now having lower back pain. He was found to be anemic with H/H of 6.5/22 and no overt bleeding, neg FOBT on admission. INR is 2.89. He was febrile with Tmax of 101, saturating on 99% room air, MAP in the 70s.     Infectious w/u negative. He was given bowel regimen of miralax BID and dulcolax suppository x1, mag citrate x2 - currently has half of bottle at bedside. He reports since starting bowel regimen he has been having formed hard black stools. He denies any abdominal pain currently.     Of note, he had complete GI w/u in 2020 including EGD, colonoscopy and capsule endoscopy with no bleeding and no source of anemia.     Allergies:  No Known Allergies      Home Medications:    Hospital Medications:  acetaminophen   Tablet .. 650 milliGRAM(s) Oral every 6 hours PRN  aMIOdarone    Tablet 200 milliGRAM(s) Oral daily  cyanocobalamin 1000 MICROGram(s) Oral daily  finasteride 5 milliGRAM(s) Oral daily  folic acid 1 milliGRAM(s) Oral daily  gabapentin 100 milliGRAM(s) Oral at bedtime  heparin  Infusion 700 Unit(s)/Hr IV Continuous <Continuous>  influenza   Vaccine 0.5 milliLiter(s) IntraMuscular once  metoprolol succinate ER 25 milliGRAM(s) Oral daily  mirtazapine 7.5 milliGRAM(s) Oral at bedtime  pantoprazole    Tablet 40 milliGRAM(s) Oral before breakfast  polyethylene glycol 3350 17 Gram(s) Oral two times a day  simethicone 80 milliGRAM(s) Chew three times a day  sodium chloride 0.9% lock flush 3 milliLiter(s) IV Push every 8 hours  spironolactone 25 milliGRAM(s) Oral daily  warfarin 3 milliGRAM(s) Oral once      PMHX/PSHX:  GI bleed    Vertigo    H/O epistaxis    Iron deficiency anemia    CKD (chronic kidney disease), stage III    Clavicle fracture    Falls    Anticoagulation goal of INR 2.0 to 2.5    ACC/AHA stage D systolic heart failure    BPH with urinary obstruction    Claudication    Peripheral arterial disease    Bleeding hemorrhoids    Hemorrhoids    History of 2019 novel coronavirus disease (COVID-19)    Pleural effusion    Depression    CAD (coronary artery disease)    CHF (congestive heart failure)    No pertinent past medical history    S/P endoscopy    S/P ventricular assist device    S/P TVR (tricuspid valve repair)    No significant past surgical history        Family history:  No pertinent family history in first degree relatives        Social History:     ROS:     General:  No weight loss, fevers, chills, night sweats, fatigue  Eyes:  No vision changes, no yellowing of eyes   ENT:  No throat pain, runny nose  CV:  No chest pain, palpitations  Resp:  No SOB, cough, wheezing  GI:  See HPI  :  No burning with urination, no hematuria   Muscle:  No muscle pain, weakness  Neuro:  No numbness/tingling, memory problems  Psych:  No fatigue, insomnia, mood problems  Heme:  No easy bruisability  Skin:  No rash, itching       PHYSICAL EXAM:     GENERAL:  no distress  HEENT:  NC/AT,  conjunctivae clear and pink  CHEST:  Full & symmetric excursion, no increased effort, breath sounds clear  HEART:  Regular rhythm, S1, S2, no murmur/rub/S3/S4, no abdominal bruit, no edema  ABDOMEN:  Soft, non-tender, distended  EXTREMITIES:  no cyanosis,clubbing or edema  SKIN:  No rash/erythema  NEURO:  Alert, oriented  RECTAL: scant dark brown stool    Vital Signs:  Vital Signs Last 24 Hrs  T(C): 36.3 (2021 15:12), Max: 37 (2021 11:18)  T(F): 97.4 (2021 15:12), Max: 98.6 (2021 11:18)  HR: 71 (2021 15:12) (67 - 75)  BP: --  BP(mean): 86 (2021 15:12) (82 - 90)  RR: 16 (2021 15:12) (16 - 16)  SpO2: 99% (2021 15:12) (97% - 99%)  Daily     Daily Weight in k.5 (2021 07:57)    LABS:                        7.6    9.05  )-----------( 155      ( 2021 10:43 )             25.7     -16    134<L>  |  100  |  14  ----------------------------<  84  4.3   |  22  |  1.03    Ca    8.4      2021 06:50    TPro  7.3  /  Alb  3.5  /  TBili  0.2  /  DBili  x   /  AST  31  /  ALT  19  /  AlkPhos  70      LIVER FUNCTIONS - ( 2021 06:50 )  Alb: 3.5 g/dL / Pro: 7.3 g/dL / ALK PHOS: 70 U/L / ALT: 19 U/L / AST: 31 U/L / GGT: x           PT/INR - ( 2021 06:50 )   PT: 18.6 sec;   INR: 1.58 ratio         PTT - ( 2021 10:43 )  PTT:50.5 sec        Imaging:    < from: CT Abdomen and Pelvis No Cont (21 @ 14:10) >  FINDINGS:  CHEST:  LUNGS AND LARGE AIRWAYS: Patent central airways. Left lower lobe 3 mm nodule, unchanged (2:53). Emphysema.  PLEURA: No pleural effusion.  VESSELS: Within normal limits.  HEART: LVAD device. CABG. Heart size is normal. No pericardial effusion.  MEDIASTINUM AND DON: No lymphadenopathy. Calcified mediastinal lymph nodes  CHEST WALL AND LOWER NECK: Median sternotomy. Implantable loop device.    ABDOMEN AND PELVIS:  LIVER: Within normal limits.  BILE DUCTS: Normal caliber.  GALLBLADDER: Layering hyperdensity, possibly sludge. No wall thickening or pericholecystic fluid.  SPLEEN: Within normal limits.  PANCREAS: Within normal limits.  ADRENALS: Within normal limits.  KIDNEYS/URETERS: Within normal limits.    BLADDER: Within normal limits.  REPRODUCTIVE ORGANS: Prostate normal in size.    BOWEL: No bowel obstruction. Appendix within normal limits  PERITONEUM: No ascites.  VESSELS: Atherosclerotic calcification.  RETROPERITONEUM/LYMPH NODES: No lymphadenopathy.  ABDOMINAL WALL: Small fat-containing umbilical hernia.  BONES: Degenerative spinal disease most prominent the lower lumbar spine.    IMPRESSION:    No evidence of acute pathology in the chest or abdomen.    In regards to clinical questions, thereis no evidence of infection or hemorrhage.    < end of copied text >           Chief Complaint:  Patient is a 65y old  Male who presents with a chief complaint of fevers, anemia, abdominal pain and constipation (15 Norman 2021 23:00)      HPI: 65M PMH ACC/AHA stage D HF due to NICM HM2 LVAD , TV annuloplasty ring 17 as destination therapy due to severe peripheral artery disease with significant stenosis  SIADH, Depression, CKD-3 with hyperkalemia, past E. coli UTIs and COVID-19 (back in 2020). He was recently started on Doxycycline 21 due to driveline drainage and labs in outpatient setting were unremarkable. He presented to [facility] ER on  stating that for the past 4 days that his abdominal pain has gotten worse, still not having bowel movements despite being on stool softeners and now having lower back pain. He was found to be anemic with H/H of 6.5/ and no overt bleeding, neg FOBT on admission. INR is 2.89. He was febrile with Tmax of 101, saturating on 99% room air, MAP in the 70s.     Infectious w/u negative. He was given bowel regimen of miralax BID and dulcolax suppository x1, mag citrate x2 - currently has half of bottle at bedside. He reports since starting bowel regimen he has been having formed hard black stools. patient reports left sided lower thoracic and abdominal pain. He states it is unchanged for months. Worse with movement. Unaffected by eating.    Of note, he had complete GI w/u in 2020 including EGD, colonoscopy and capsule endoscopy with no bleeding and no source of anemia.     Allergies:  No Known Allergies      Home Medications:    Hospital Medications:  acetaminophen   Tablet .. 650 milliGRAM(s) Oral every 6 hours PRN  aMIOdarone    Tablet 200 milliGRAM(s) Oral daily  cyanocobalamin 1000 MICROGram(s) Oral daily  finasteride 5 milliGRAM(s) Oral daily  folic acid 1 milliGRAM(s) Oral daily  gabapentin 100 milliGRAM(s) Oral at bedtime  heparin  Infusion 700 Unit(s)/Hr IV Continuous <Continuous>  influenza   Vaccine 0.5 milliLiter(s) IntraMuscular once  metoprolol succinate ER 25 milliGRAM(s) Oral daily  mirtazapine 7.5 milliGRAM(s) Oral at bedtime  pantoprazole    Tablet 40 milliGRAM(s) Oral before breakfast  polyethylene glycol 3350 17 Gram(s) Oral two times a day  simethicone 80 milliGRAM(s) Chew three times a day  sodium chloride 0.9% lock flush 3 milliLiter(s) IV Push every 8 hours  spironolactone 25 milliGRAM(s) Oral daily  warfarin 3 milliGRAM(s) Oral once      PMHX/PSHX:  GI bleed    Vertigo    H/O epistaxis    Iron deficiency anemia    CKD (chronic kidney disease), stage III    Clavicle fracture    Falls    Anticoagulation goal of INR 2.0 to 2.5    ACC/AHA stage D systolic heart failure    BPH with urinary obstruction    Claudication    Peripheral arterial disease    Bleeding hemorrhoids    Hemorrhoids    History of 2019 novel coronavirus disease (COVID-19)    Pleural effusion    Depression    CAD (coronary artery disease)    CHF (congestive heart failure)    No pertinent past medical history    S/P endoscopy    S/P ventricular assist device    S/P TVR (tricuspid valve repair)    No significant past surgical history        Family history:  No pertinent family history in first degree relatives        Social History:     ROS:     General:  No weight loss, fevers, chills, night sweats, fatigue  Eyes:  No vision changes, no yellowing of eyes   ENT:  No throat pain, runny nose  CV:  No chest pain, palpitations  Resp:  No SOB, cough, wheezing  GI:  See HPI  :  No burning with urination, no hematuria   Muscle:  No muscle pain, weakness  Neuro:  No numbness/tingling, memory problems  Psych:  No fatigue, insomnia, mood problems  Heme:  No easy bruisability  Skin:  No rash, itching       PHYSICAL EXAM:     GENERAL:  no distress  HEENT:  NC/AT,  conjunctivae clear and pink  CHEST:  Full & symmetric excursion, no increased effort, breath sounds clear, tender to palpation over lower left ribs  HEART:  Regular rhythm, S1, S2, no murmur/rub/S3/S4, no abdominal bruit, no edema  ABDOMEN:  Soft, non-tender, distended  EXTREMITIES:  no cyanosis,clubbing or edema  SKIN:  No rash/erythema  NEURO:  Alert, oriented  RECTAL: scant dark brown stool    Vital Signs:  Vital Signs Last 24 Hrs  T(C): 36.3 (2021 15:12), Max: 37 (2021 11:18)  T(F): 97.4 (2021 15:12), Max: 98.6 (2021 11:18)  HR: 71 (2021 15:12) (67 - 75)  BP: --  BP(mean): 86 (2021 15:12) (82 - 90)  RR: 16 (2021 15:12) (16 - 16)  SpO2: 99% (2021 15:12) (97% - 99%)  Daily     Daily Weight in k.5 (2021 07:57)    LABS:                        7.6    9.05  )-----------( 155      ( 2021 10:43 )             25.7         134<L>  |  100  |  14  ----------------------------<  84  4.3   |  22  |  1.03    Ca    8.4      2021 06:50    TPro  7.3  /  Alb  3.5  /  TBili  0.2  /  DBili  x   /  AST  31  /  ALT  19  /  AlkPhos  70      LIVER FUNCTIONS - ( 2021 06:50 )  Alb: 3.5 g/dL / Pro: 7.3 g/dL / ALK PHOS: 70 U/L / ALT: 19 U/L / AST: 31 U/L / GGT: x           PT/INR - ( 2021 06:50 )   PT: 18.6 sec;   INR: 1.58 ratio         PTT - ( 2021 10:43 )  PTT:50.5 sec        Imaging:    < from: CT Abdomen and Pelvis No Cont (21 @ 14:10) >  FINDINGS:  CHEST:  LUNGS AND LARGE AIRWAYS: Patent central airways. Left lower lobe 3 mm nodule, unchanged (2:53). Emphysema.  PLEURA: No pleural effusion.  VESSELS: Within normal limits.  HEART: LVAD device. CABG. Heart size is normal. No pericardial effusion.  MEDIASTINUM AND DON: No lymphadenopathy. Calcified mediastinal lymph nodes  CHEST WALL AND LOWER NECK: Median sternotomy. Implantable loop device.    ABDOMEN AND PELVIS:  LIVER: Within normal limits.  BILE DUCTS: Normal caliber.  GALLBLADDER: Layering hyperdensity, possibly sludge. No wall thickening or pericholecystic fluid.  SPLEEN: Within normal limits.  PANCREAS: Within normal limits.  ADRENALS: Within normal limits.  KIDNEYS/URETERS: Within normal limits.    BLADDER: Within normal limits.  REPRODUCTIVE ORGANS: Prostate normal in size.    BOWEL: No bowel obstruction. Appendix within normal limits  PERITONEUM: No ascites.  VESSELS: Atherosclerotic calcification.  RETROPERITONEUM/LYMPH NODES: No lymphadenopathy.  ABDOMINAL WALL: Small fat-containing umbilical hernia.  BONES: Degenerative spinal disease most prominent the lower lumbar spine.    IMPRESSION:    No evidence of acute pathology in the chest or abdomen.    In regards to clinical questions, thereis no evidence of infection or hemorrhage.    < end of copied text >

## 2021-01-16 NOTE — CONSULT NOTE ADULT - ASSESSMENT
65 yo male with PMH of AHA stage D HF 2/2 NICM with HM2 LVAD (on coumadin) as destination therapy due to severe PAD, SIADH who presents after a fall found to have a supratherapeutic INR and anemia. Reported episodes of GI Bleeding butt currently no overt bleeding since admission after correction of INR.    Impression:  # Anemia - patient reports black stool however formed and hard not consistent with bleed; minimal dark brown stool on exam; hgb low however stable and has not required transfusion since admission. Hematology following and smear consistent with LVAD changes  #Abdominal Pain - resolved? patient denied pain on exam this evening, may have improved as he states he had more BM today; no pathology noted on imaging; possible component of ileus  # LVAD on AC      Recommendation:  - lower suspicion for bleed at this time given constipation and overall stable but low hemoglobin  - can increase PPI to BID or add H2 blocker trial  - would continue with aggressive bowel regimen as patient states he is still straining; can add senna at night or dulcolax PO daily  - rest of care per primary team      Susan Jacobo PGY-4  Gastroenterology Fellow  Pager #[phone] or [phone]  Please page on-call Fellow on weekends/after 5 pm on weekdays   Please call answering service for on-call GI fellow ([phone]) after 5pm and before 8am, and on weekends.          63 yo male with PMH of AHA stage D HF 2/2 NICM with HM2 LVAD (on coumadin) as destination therapy due to severe PAD, SIADH who presents after a fall found to have a supratherapeutic INR and anemia. Reported episodes of GI Bleeding butt currently no overt bleeding since admission after correction of INR.    Impression:  # Anemia - patient reports black stool however formed and hard not consistent with bleed; minimal dark brown stool on exam; hgb low however stable and has not required transfusion since admission. Hematology following and smear consistent with LVAD changes  #Chest wall/Abdominal Pain - pain extends from lower left chest down left side of abdomen; worse with movement and significant tenderness on palpation of lower ribs; likely msk in nature; no pathology noted on imaging; possible component of ileus/constipation  # LVAD on AC      Recommendation:  - lower suspicion for bleed at this time given constipation and overall stable but low hemoglobin  - can increase PPI to BID or add H2 blocker trial  - would continue with aggressive bowel regimen as patient states he is still straining; can add senna at night or dulcolax PO daily  - would also add lidocaine patch at left lower ribs as patient had significant tendernes son palpation  - rest of care per primary team      Susan Jacobo PGY-4  Gastroenterology Fellow  Pager #[phone] or [phone]  Please page on-call Fellow on weekends/after 5 pm on weekdays   Please call answering service for on-call GI fellow ([phone]) after 5pm and before 8am, and on weekends.

## 2021-01-16 NOTE — CONSULT NOTE ADULT - REASON FOR ADMISSION
fevers, anemia, abdominal pain and constipation

## 2021-01-16 NOTE — CONSULT NOTE ADULT - ATTENDING COMMENTS
66 y/o M w/ h/o stage D NICM/HFrEF s/p HM2 (as DT 9/17 d/t PAD), TV ring, PAD, prior COVID (4/20) who was readmitted with abdominal pain x 4-5 days in setting of constipation as well as recent initiation of doxycycline for driveline drainage. New pancytopenia since admission. Peripheral smear review pending, but suspect this is likely related to infection given the timing of his CBC changes. His current medications are not classic of drug induced cytopenias. Will continue to trend CBC, not currently neutropenic. Continue antibiotics, monitor INR. Check viral PCRs.
History/PE as above. Patient seen/examined. Patient somewhat vague but describing left-sided abdominal discomfort of long-standing duration-months and possibly longer. Experiencing constipation. Reports dark stool but per TELLO by GI fellow noted to have brown stool. No detection of melena or hematochezia. Patient chronically ill but otherwise comfortable/NAD/nontoxic-appearing. Abdomen-tenderness elicited at left costal margin reproducing patient's abdominal pain. Abdomen otherwise soft, nontender and without mass or hepatosplenomegaly. Assessment plan-as noted. No indication of overt GI bleeding. In view of GI workup performed 7/2020 including colonoscopy/EGD and capsule endoscopy no plan for followup endoscopic assessment at this time. As indicated, left-sided pain possibly musculoskeletal in origin in view of tenderness at left costal margin reproducing patient's pain. Consider topical lidocaine patch. P.r.n. Tylenol.

## 2021-01-16 NOTE — PROGRESS NOTE ADULT - SUBJECTIVE AND OBJECTIVE BOX
Subjective: "I'm okay." Pt continues to c/o L sided abd pain and reports an episode of dark stool yesterday. Denies chest pain, SOB, palpitations, headache, dizziness, fever, chills and n/v/d.     Telemetry: NSR 60s-80s    Vital Signs Last 24 Hrs  T(C): 36.3 (21 @ 15:12), Max: 37 (21 @ 11:18)  T(F): 97.4 (21 @ 15:12), Max: 98.6 (21 @ 11:18)  HR: 71 (21 @ 15:12) (67 - 75)  BP: MAP 86  RR: 16 (21 @ 15:12) ( - )  SpO2: 99% (21 @ 15:12) (97% - 99%)             01-15 @ 07:  -   @ 07:00  --------------------------------------------------------  IN: 604 mL / OUT: 250 mL / NET: 354 mL     @ 07:  -   @ 16:30  --------------------------------------------------------  IN: 360 mL / OUT: 0 mL / NET: 360 mL      Daily Weight in k.5 (2021 07:57)      Coumadin    [ x ] YES          [  ] NO         Reason: LVAD                              7.6    9.05  )-----------( 155      ( 2021 10:43 )             25.7           134<L>  |  100  |  14  ----------------------------<  84  4.3   |  22  |  1.03    Ca    8.4      2021 06:50    TPro  7.3  /  Alb  3.5  /  TBili  0.2  /  DBili  x   /  AST  31  /  ALT  19  /  AlkPhos  70  -16    PT/INR - ( 2021 06:50 )   PT: 18.6 sec;   INR: 1.58 ratio       PTT - ( 2021 10:43 )  PTT:50.5 sec      PHYSICAL EXAM:    Neurology: alert and oriented x 3, nonfocal, no gross deficits    CV: (+) VAD hum     Lungs: CTA b/l, no wheezes, rales or rhonchi     Abdomen: (+) driveline site w/ dressing in place - CDI, soft, non-distended, nontender, (+) bowel sounds, (+) BM on 1/15     : voiding freely                Extremities: no LE edema b/l, warm and well perfused distally            acetaminophen Tablet 650 milliGRAM(s) Oral every 6 hours PRN  aMIOdarone Tablet 200 milliGRAM(s) Oral daily  cyanocobalamin 1000 MICROGram(s) Oral daily  finasteride 5 milliGRAM(s) Oral daily  folic acid 1 milliGRAM(s) Oral daily  gabapentin 100 milliGRAM(s) Oral at bedtime  heparin  Infusion 700 Unit(s)/Hr IV Continuous <Continuous>  influenza Vaccine 0.5 milliLiter(s) IntraMuscular once  metoprolol succinate ER 25 milliGRAM(s) Oral daily  mirtazapine 7.5 milliGRAM(s) Oral at bedtime  pantoprazole Tablet 40 milliGRAM(s) Oral before breakfast  polyethylene glycol 3350 17 Gram(s) Oral two times a day  simethicone 80 milliGRAM(s) Chew three times a day  sodium chloride 0.9% lock flush 3 milliLiter(s) IV Push every 8 hours  spironolactone 25 milliGRAM(s) Oral daily       Discussed with Cardiothoracic Team at AM rounds.

## 2021-01-16 NOTE — PROGRESS NOTE ADULT - PROBLEM SELECTOR PLAN 1
Management as per heart failure team   Will dose 3 mg Coumadin this evening as per LVAD team   Continue heparin gtt to bridge   F/u haptoglobin, SPEP & UPEP   Daily LDH   Continue current medication regimen as per CHF team

## 2021-01-16 NOTE — PROGRESS NOTE ADULT - PROBLEM SELECTOR PLAN 3
ID following  Afebrile since 1/12   CT 1/11 no source infection / bleeding  BCx from 1/11 now (+) for cultibacterium acnes in anaerobic bottle   Will repeat BCx x 2 as per Dr. Son and f/u further ID recommendations   F/u stool guaiac  GI c/s placed as per Dr. Son - recommendations pending

## 2021-01-17 LAB
ANION GAP SERPL CALC-SCNC: 12 MMOL/L — SIGNIFICANT CHANGE UP (ref 5–17)
APTT BLD: 55.4 SEC — HIGH (ref 27.5–35.5)
APTT BLD: 61.3 SEC — HIGH (ref 27.5–35.5)
APTT BLD: 61.8 SEC — HIGH (ref 27.5–35.5)
BUN SERPL-MCNC: 13 MG/DL — SIGNIFICANT CHANGE UP (ref 7–23)
CALCIUM SERPL-MCNC: 9 MG/DL — SIGNIFICANT CHANGE UP (ref 8.4–10.5)
CHLORIDE SERPL-SCNC: 99 MMOL/L — SIGNIFICANT CHANGE UP (ref 96–108)
CO2 SERPL-SCNC: 23 MMOL/L — SIGNIFICANT CHANGE UP (ref 22–31)
CREAT SERPL-MCNC: 1.08 MG/DL — SIGNIFICANT CHANGE UP (ref 0.5–1.3)
GLUCOSE SERPL-MCNC: 93 MG/DL — SIGNIFICANT CHANGE UP (ref 70–99)
HAPTOGLOB SERPL-MCNC: 113 MG/DL — SIGNIFICANT CHANGE UP (ref 34–200)
HCT VFR BLD CALC: 25.3 % — LOW (ref 39–50)
HCT VFR BLD CALC: 25.4 % — LOW (ref 39–50)
HGB BLD-MCNC: 7.6 G/DL — LOW (ref 13–17)
HGB BLD-MCNC: 7.7 G/DL — LOW (ref 13–17)
INR BLD: 1.58 RATIO — HIGH (ref 0.88–1.16)
LDH SERPL L TO P-CCNC: 368 U/L — HIGH (ref 50–242)
MCHC RBC-ENTMCNC: 23.6 PG — LOW (ref 27–34)
MCHC RBC-ENTMCNC: 23.9 PG — LOW (ref 27–34)
MCHC RBC-ENTMCNC: 30 GM/DL — LOW (ref 32–36)
MCHC RBC-ENTMCNC: 30.3 GM/DL — LOW (ref 32–36)
MCV RBC AUTO: 78.6 FL — LOW (ref 80–100)
MCV RBC AUTO: 78.9 FL — LOW (ref 80–100)
NRBC # BLD: 0 /100 WBCS — SIGNIFICANT CHANGE UP (ref 0–0)
NRBC # BLD: 0 /100 WBCS — SIGNIFICANT CHANGE UP (ref 0–0)
PLATELET # BLD AUTO: 175 K/UL — SIGNIFICANT CHANGE UP (ref 150–400)
PLATELET # BLD AUTO: 176 K/UL — SIGNIFICANT CHANGE UP (ref 150–400)
POTASSIUM SERPL-MCNC: 4.2 MMOL/L — SIGNIFICANT CHANGE UP (ref 3.5–5.3)
POTASSIUM SERPL-SCNC: 4.2 MMOL/L — SIGNIFICANT CHANGE UP (ref 3.5–5.3)
PROT SERPL-MCNC: 7.8 G/DL — SIGNIFICANT CHANGE UP (ref 6–8.3)
PROT SERPL-MCNC: 7.8 G/DL — SIGNIFICANT CHANGE UP (ref 6–8.3)
PROTHROM AB SERPL-ACNC: 18.6 SEC — HIGH (ref 10.6–13.6)
RBC # BLD: 3.22 M/UL — LOW (ref 4.2–5.8)
RBC # BLD: 3.22 M/UL — LOW (ref 4.2–5.8)
RBC # FLD: 17.7 % — HIGH (ref 10.3–14.5)
RBC # FLD: 17.9 % — HIGH (ref 10.3–14.5)
SODIUM SERPL-SCNC: 134 MMOL/L — LOW (ref 135–145)
WBC # BLD: 11.19 K/UL — HIGH (ref 3.8–10.5)
WBC # BLD: 9.63 K/UL — SIGNIFICANT CHANGE UP (ref 3.8–10.5)
WBC # FLD AUTO: 11.19 K/UL — HIGH (ref 3.8–10.5)
WBC # FLD AUTO: 9.63 K/UL — SIGNIFICANT CHANGE UP (ref 3.8–10.5)

## 2021-01-17 PROCEDURE — 99232 SBSQ HOSP IP/OBS MODERATE 35: CPT

## 2021-01-17 PROCEDURE — 99222 1ST HOSP IP/OBS MODERATE 55: CPT | Mod: GC

## 2021-01-17 RX ORDER — WARFARIN SODIUM 2.5 MG/1
5 TABLET ORAL ONCE
Refills: 0 | Status: COMPLETED | OUTPATIENT
Start: 2021-01-17 | End: 2021-01-17

## 2021-01-17 RX ADMIN — SIMETHICONE 80 MILLIGRAM(S): 80 TABLET, CHEWABLE ORAL at 05:26

## 2021-01-17 RX ADMIN — SIMETHICONE 80 MILLIGRAM(S): 80 TABLET, CHEWABLE ORAL at 13:13

## 2021-01-17 RX ADMIN — SENNA PLUS 2 TABLET(S): 8.6 TABLET ORAL at 21:46

## 2021-01-17 RX ADMIN — PANTOPRAZOLE SODIUM 40 MILLIGRAM(S): 20 TABLET, DELAYED RELEASE ORAL at 05:26

## 2021-01-17 RX ADMIN — SODIUM CHLORIDE 3 MILLILITER(S): 9 INJECTION INTRAMUSCULAR; INTRAVENOUS; SUBCUTANEOUS at 21:31

## 2021-01-17 RX ADMIN — HEPARIN SODIUM 7 UNIT(S)/HR: 5000 INJECTION INTRAVENOUS; SUBCUTANEOUS at 06:32

## 2021-01-17 RX ADMIN — Medication 1 MILLIGRAM(S): at 13:13

## 2021-01-17 RX ADMIN — PANTOPRAZOLE SODIUM 40 MILLIGRAM(S): 20 TABLET, DELAYED RELEASE ORAL at 18:02

## 2021-01-17 RX ADMIN — CEFTRIAXONE 100 MILLIGRAM(S): 500 INJECTION, POWDER, FOR SOLUTION INTRAMUSCULAR; INTRAVENOUS at 18:02

## 2021-01-17 RX ADMIN — AMIODARONE HYDROCHLORIDE 200 MILLIGRAM(S): 400 TABLET ORAL at 05:26

## 2021-01-17 RX ADMIN — POLYETHYLENE GLYCOL 3350 17 GRAM(S): 17 POWDER, FOR SOLUTION ORAL at 18:02

## 2021-01-17 RX ADMIN — HEPARIN SODIUM 7 UNIT(S)/HR: 5000 INJECTION INTRAVENOUS; SUBCUTANEOUS at 03:22

## 2021-01-17 RX ADMIN — PREGABALIN 1000 MICROGRAM(S): 225 CAPSULE ORAL at 13:13

## 2021-01-17 RX ADMIN — FINASTERIDE 5 MILLIGRAM(S): 5 TABLET, FILM COATED ORAL at 13:13

## 2021-01-17 RX ADMIN — MIRTAZAPINE 7.5 MILLIGRAM(S): 45 TABLET, ORALLY DISINTEGRATING ORAL at 21:46

## 2021-01-17 RX ADMIN — SIMETHICONE 80 MILLIGRAM(S): 80 TABLET, CHEWABLE ORAL at 21:46

## 2021-01-17 RX ADMIN — Medication 25 MILLIGRAM(S): at 05:26

## 2021-01-17 RX ADMIN — WARFARIN SODIUM 5 MILLIGRAM(S): 2.5 TABLET ORAL at 21:48

## 2021-01-17 RX ADMIN — SPIRONOLACTONE 25 MILLIGRAM(S): 25 TABLET, FILM COATED ORAL at 05:26

## 2021-01-17 RX ADMIN — SODIUM CHLORIDE 3 MILLILITER(S): 9 INJECTION INTRAMUSCULAR; INTRAVENOUS; SUBCUTANEOUS at 05:19

## 2021-01-17 RX ADMIN — GABAPENTIN 100 MILLIGRAM(S): 400 CAPSULE ORAL at 21:46

## 2021-01-17 RX ADMIN — CEFTRIAXONE 100 MILLIGRAM(S): 500 INJECTION, POWDER, FOR SOLUTION INTRAMUSCULAR; INTRAVENOUS at 05:26

## 2021-01-17 RX ADMIN — SODIUM CHLORIDE 3 MILLILITER(S): 9 INJECTION INTRAMUSCULAR; INTRAVENOUS; SUBCUTANEOUS at 13:09

## 2021-01-17 NOTE — PROGRESS NOTE ADULT - ASSESSMENT
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 coli UTIs and COVID-19 (back in April 2020). Overall patient has been progressing well. He was recently seen in clinic where he complained of abdominal pain and constipation. He was started on Doxycycline 1/7/21 due to driveline drainage and labs last week were unremarkable. He presents to [facility] ER stating that for the past 4 days that his abdominal pain has gotten worse, still not having bowel movements despite being on stool softeners and now having lower back pain. Todays labs show patient is anemic with H/H of 6.5/22, fecal occult pending. INR is 2.89. He is febrile with Tmax of 101, saturating on 99% room air, MAP in the 70s. He denies any chest pain, shortness of breath, dizziness, changes in urination or muscle aches.   1/12 INR 3.0  Followed by heart failure team  NPO until GI bleed source ruled out> CT neg  1/13 VSS; INR 2.5 - resume coumadin 1 mg this evening as per LVAD team ; h/h stable 8/29 today; bc neg 1/11 and ua neg; heme consulted today for pancytopenia   continue to observe off abx ; ID following   1/14  INR   1.76  coumadin 2.5 mg,   await heme cslt   HF following  1/15 INR  1.5  Coumadin 3  mg   Heparin gtt started as bridge  1/16 VSS, NAD. INR 1.58, will dose 3 mg Coumadin tonight and continue heparin gtt as bridge. H/H this AM dropped from 8/26.3 --> 7/23.2 - pt asx, reports dark BM yesterday - will repeat CBC in afternoon as per Dr. Son. BCx from 1/11 now (+) for cultebacterium acnes - pt has been afebrile since 1/12, ID following. Will repeat BCx x 2 as per Dr. Son and f/u w/ ID. As pt reports having a dark BM yesterday and notes continued L sided abd pain - will repeat stool guaiac and place GI c/s per Dr. Son given concern for GIB and/or diverticular disease. SPEP, UPEP and haptoglobin pending. Awaiting further heme recommendations.    1/17 Coumadin 5 mg Continue Heparin gtt bridge  Mgmt as per heart failure  Blood C/S pending  Continue empiric abx

## 2021-01-17 NOTE — PROGRESS NOTE ADULT - SUBJECTIVE AND OBJECTIVE BOX
HPI:  65M PMH ACC/AHA stage D HF due to NICM HM2 LVAD , TV annuloplasty ring 17 as destination therapy due to severe peripheral artery disease with significant stenosis  SIADH, Depression, CKD-3 with hyperkalemia, past E. coli UTIs and COVID-19 (back in 2020). Overall patient has been progressing well. He was recently seen in clinic where he complained of abdominal pain and constipation. He was started on Doxycycline 21 due to driveline drainage and labs last week were unremarkable. He presents to [facility] ER stating that for the past 4 days that his abdominal pain has gotten worse, still not having bowel movements despite being on stool softeners and now having lower back pain. Todays labs show patient is anemic with H/H of 6.5/22, fecal occult pending. INR is 2.89. He is febrile with Tmax of 101, saturating on 99% room air, MAP in the 70s. He denies any chest pain, shortness of breath, dizziness, changes in urination or muscle aches.      (2021 12:48)      MEDICATIONS  (STANDING):  aMIOdarone    Tablet 200 milliGRAM(s) Oral daily  cefTRIAXone   IVPB 2000 milliGRAM(s) IV Intermittent every 12 hours  cyanocobalamin 1000 MICROGram(s) Oral daily  finasteride 5 milliGRAM(s) Oral daily  folic acid 1 milliGRAM(s) Oral daily  gabapentin 100 milliGRAM(s) Oral at bedtime  heparin  Infusion 700 Unit(s)/Hr (7 mL/Hr) IV Continuous <Continuous>  influenza   Vaccine 0.5 milliLiter(s) IntraMuscular once  metoprolol succinate ER 25 milliGRAM(s) Oral daily  mirtazapine 7.5 milliGRAM(s) Oral at bedtime  pantoprazole    Tablet 40 milliGRAM(s) Oral every 12 hours  polyethylene glycol 3350 17 Gram(s) Oral two times a day  senna 2 Tablet(s) Oral at bedtime  simethicone 80 milliGRAM(s) Chew three times a day  sodium chloride 0.9% lock flush 3 milliLiter(s) IV Push every 8 hours  spironolactone 25 milliGRAM(s) Oral daily  warfarin 5 milliGRAM(s) Oral once    MEDICATIONS  (PRN):  acetaminophen   Tablet .. 650 milliGRAM(s) Oral every 6 hours PRN Temp greater or equal to 38C (100.4F), Mild Pain (1 - 3)      I&O's Summary    2021 07:  -  2021 07:00  --------------------------------------------------------  IN: 888 mL / OUT: 1000 mL / NET: -112 mL    2021 07:  -  2021 11:37  --------------------------------------------------------  IN: 240 mL / OUT: 0 mL / NET: 240 mL                              7.6    9.63  )-----------( 175      ( 2021 05:35 )             25.3           134<L>  |  99  |  13  ----------------------------<  93  4.2   |  23  |  1.08    Ca    9.0      2021 05:35    TPro  7.8  /  Alb  x   /  TBili  x   /  DBili  x   /  AST  x   /  ALT  x   /  AlkPhos  x       Physical Exam:   Neurology: alert and oriented x 3, nonfocal, no gross deficits    CV: (+) VAD hum     Lungs: CTA b/l, no wheezes, rales or rhonchi     Abdomen: (+) driveline site w/ dressing in place - CDI, soft, non-distended, nontender, (+) bowel sounds, (+) BM on 1/15     : voiding freely                Extremities: no LE edema b/l, warm and well perfused distally    LVAD   VAD TYPE HM 2  Speed 8800  Flow 5  Power 4.9  PI  6.3  Assessment of driveline exit site: driveline dressing c/d/i  no erythema noted    No programming changes were made   I&O's Summary    2021 07:01  -  2021 07:00  --------------------------------------------------------  IN: 888 mL / OUT: 1000 mL / NET: -112 mL    2021 07:01  -  2021 11:37  --------------------------------------------------------  IN: 240 mL / OUT: 0 mL / NET: 240 mL      Daily     Daily Weight in k.9 (2021 10:12)

## 2021-01-17 NOTE — PROGRESS NOTE ADULT - PROBLEM SELECTOR PLAN 1
Management as per heart failure team   Will dose 5 mg Coumadin this evening as per LVAD team   Continue heparin gtt to bridge   F/u haptoglobin, SPEP & UPEP   Daily LDH   Continue current medication regimen as per CHF team

## 2021-01-17 NOTE — PROGRESS NOTE ADULT - PROBLEM SELECTOR PLAN 2
Heme following - results of peripheral smear from 1/14 c/w known LVAD, further recommendations pending   Daily CBC  Monitor VS

## 2021-01-18 LAB
% ALBUMIN: 48.7 % — SIGNIFICANT CHANGE UP
% ALBUMIN: 50.2 % — SIGNIFICANT CHANGE UP
% ALPHA 1: 5.2 % — SIGNIFICANT CHANGE UP
% ALPHA 1: 5.4 % — SIGNIFICANT CHANGE UP
% ALPHA 2: 9.8 % — SIGNIFICANT CHANGE UP
% ALPHA 2: 9.9 % — SIGNIFICANT CHANGE UP
% BETA: 12.9 % — SIGNIFICANT CHANGE UP
% BETA: 12.9 % — SIGNIFICANT CHANGE UP
% GAMMA: 21.8 % — SIGNIFICANT CHANGE UP
% GAMMA: 23.2 % — SIGNIFICANT CHANGE UP
ALBUMIN SERPL ELPH-MCNC: 3.7 G/DL — SIGNIFICANT CHANGE UP (ref 3.6–5.5)
ALBUMIN SERPL ELPH-MCNC: 3.8 G/DL — SIGNIFICANT CHANGE UP (ref 3.6–5.5)
ALBUMIN/GLOB SERPL ELPH: 1 RATIO — SIGNIFICANT CHANGE UP
ALBUMIN/GLOB SERPL ELPH: 1 RATIO — SIGNIFICANT CHANGE UP
ALPHA1 GLOB SERPL ELPH-MCNC: 0.4 G/DL — SIGNIFICANT CHANGE UP (ref 0.1–0.4)
ALPHA1 GLOB SERPL ELPH-MCNC: 0.4 G/DL — SIGNIFICANT CHANGE UP (ref 0.1–0.4)
ALPHA2 GLOB SERPL ELPH-MCNC: 0.7 G/DL — SIGNIFICANT CHANGE UP (ref 0.5–1)
ALPHA2 GLOB SERPL ELPH-MCNC: 0.8 G/DL — SIGNIFICANT CHANGE UP (ref 0.5–1)
ANION GAP SERPL CALC-SCNC: 11 MMOL/L — SIGNIFICANT CHANGE UP (ref 5–17)
APTT BLD: 48.5 SEC — HIGH (ref 27.5–35.5)
B-GLOBULIN SERPL ELPH-MCNC: 0.9 G/DL — SIGNIFICANT CHANGE UP (ref 0.5–1)
B-GLOBULIN SERPL ELPH-MCNC: 1 G/DL — SIGNIFICANT CHANGE UP (ref 0.5–1)
BUN SERPL-MCNC: 14 MG/DL — SIGNIFICANT CHANGE UP (ref 7–23)
CALCIUM SERPL-MCNC: 9.5 MG/DL — SIGNIFICANT CHANGE UP (ref 8.4–10.5)
CHLORIDE SERPL-SCNC: 99 MMOL/L — SIGNIFICANT CHANGE UP (ref 96–108)
CO2 SERPL-SCNC: 24 MMOL/L — SIGNIFICANT CHANGE UP (ref 22–31)
CREAT SERPL-MCNC: 1.31 MG/DL — HIGH (ref 0.5–1.3)
CREATININE, URINE RESULT: 156 MG/DL — SIGNIFICANT CHANGE UP
CULTURE RESULTS: SIGNIFICANT CHANGE UP
GAMMA GLOBULIN: 1.6 G/DL — SIGNIFICANT CHANGE UP (ref 0.6–1.6)
GAMMA GLOBULIN: 1.8 G/DL — HIGH (ref 0.6–1.6)
GLUCOSE SERPL-MCNC: 91 MG/DL — SIGNIFICANT CHANGE UP (ref 70–99)
H CAPSUL AG SPEC-ACNC: SIGNIFICANT CHANGE UP
H CAPSUL AG UR QL IA: SIGNIFICANT CHANGE UP
HCT VFR BLD CALC: 26.1 % — LOW (ref 39–50)
HCT VFR BLD CALC: 28.5 % — LOW (ref 39–50)
HGB BLD-MCNC: 7.8 G/DL — LOW (ref 13–17)
HGB BLD-MCNC: 8.4 G/DL — LOW (ref 13–17)
INR BLD: 1.8 RATIO — HIGH (ref 0.88–1.16)
MCHC RBC-ENTMCNC: 23.5 PG — LOW (ref 27–34)
MCHC RBC-ENTMCNC: 23.6 PG — LOW (ref 27–34)
MCHC RBC-ENTMCNC: 29.5 GM/DL — LOW (ref 32–36)
MCHC RBC-ENTMCNC: 29.9 GM/DL — LOW (ref 32–36)
MCV RBC AUTO: 79.1 FL — LOW (ref 80–100)
MCV RBC AUTO: 79.6 FL — LOW (ref 80–100)
NRBC # BLD: 4 /100 WBCS — HIGH (ref 0–0)
NRBC # BLD: 7 /100 WBCS — HIGH (ref 0–0)
ORGANISM # SPEC MICROSCOPIC CNT: SIGNIFICANT CHANGE UP
ORGANISM # SPEC MICROSCOPIC CNT: SIGNIFICANT CHANGE UP
PLATELET # BLD AUTO: 232 K/UL — SIGNIFICANT CHANGE UP (ref 150–400)
PLATELET # BLD AUTO: 258 K/UL — SIGNIFICANT CHANGE UP (ref 150–400)
POTASSIUM SERPL-MCNC: 5 MMOL/L — SIGNIFICANT CHANGE UP (ref 3.5–5.3)
POTASSIUM SERPL-SCNC: 5 MMOL/L — SIGNIFICANT CHANGE UP (ref 3.5–5.3)
PROT ?TM UR-MCNC: 15 MG/DL — HIGH (ref 0–12)
PROT PATTERN SERPL ELPH-IMP: SIGNIFICANT CHANGE UP
PROT PATTERN SERPL ELPH-IMP: SIGNIFICANT CHANGE UP
PROT SERPL-MCNC: 7.9 G/DL — SIGNIFICANT CHANGE UP (ref 6–8.3)
PROT SERPL-MCNC: 7.9 G/DL — SIGNIFICANT CHANGE UP (ref 6–8.3)
PROTHROM AB SERPL-ACNC: 21 SEC — HIGH (ref 10.6–13.6)
RBC # BLD: 3.3 M/UL — LOW (ref 4.2–5.8)
RBC # BLD: 3.58 M/UL — LOW (ref 4.2–5.8)
RBC # FLD: 17.8 % — HIGH (ref 10.3–14.5)
RBC # FLD: 17.9 % — HIGH (ref 10.3–14.5)
SODIUM SERPL-SCNC: 134 MMOL/L — LOW (ref 135–145)
SPECIMEN SOURCE: SIGNIFICANT CHANGE UP
WBC # BLD: 10.39 K/UL — SIGNIFICANT CHANGE UP (ref 3.8–10.5)
WBC # BLD: 10.77 K/UL — HIGH (ref 3.8–10.5)
WBC # FLD AUTO: 10.39 K/UL — SIGNIFICANT CHANGE UP (ref 3.8–10.5)
WBC # FLD AUTO: 10.77 K/UL — HIGH (ref 3.8–10.5)

## 2021-01-18 PROCEDURE — 99233 SBSQ HOSP IP/OBS HIGH 50: CPT | Mod: 25

## 2021-01-18 PROCEDURE — 93750 INTERROGATION VAD IN PERSON: CPT

## 2021-01-18 RX ORDER — WARFARIN SODIUM 2.5 MG/1
5 TABLET ORAL ONCE
Refills: 0 | Status: COMPLETED | OUTPATIENT
Start: 2021-01-18 | End: 2021-01-18

## 2021-01-18 RX ORDER — LIDOCAINE 4 G/100G
1 CREAM TOPICAL DAILY
Refills: 0 | Status: DISCONTINUED | OUTPATIENT
Start: 2021-01-18 | End: 2021-01-20

## 2021-01-18 RX ADMIN — SIMETHICONE 80 MILLIGRAM(S): 80 TABLET, CHEWABLE ORAL at 16:48

## 2021-01-18 RX ADMIN — MIRTAZAPINE 7.5 MILLIGRAM(S): 45 TABLET, ORALLY DISINTEGRATING ORAL at 23:13

## 2021-01-18 RX ADMIN — CEFTRIAXONE 100 MILLIGRAM(S): 500 INJECTION, POWDER, FOR SOLUTION INTRAMUSCULAR; INTRAVENOUS at 17:07

## 2021-01-18 RX ADMIN — GABAPENTIN 100 MILLIGRAM(S): 400 CAPSULE ORAL at 23:13

## 2021-01-18 RX ADMIN — SODIUM CHLORIDE 3 MILLILITER(S): 9 INJECTION INTRAMUSCULAR; INTRAVENOUS; SUBCUTANEOUS at 05:07

## 2021-01-18 RX ADMIN — SIMETHICONE 80 MILLIGRAM(S): 80 TABLET, CHEWABLE ORAL at 23:13

## 2021-01-18 RX ADMIN — CEFTRIAXONE 100 MILLIGRAM(S): 500 INJECTION, POWDER, FOR SOLUTION INTRAMUSCULAR; INTRAVENOUS at 05:13

## 2021-01-18 RX ADMIN — Medication 1 MILLIGRAM(S): at 11:10

## 2021-01-18 RX ADMIN — WARFARIN SODIUM 5 MILLIGRAM(S): 2.5 TABLET ORAL at 23:13

## 2021-01-18 RX ADMIN — SPIRONOLACTONE 25 MILLIGRAM(S): 25 TABLET, FILM COATED ORAL at 05:13

## 2021-01-18 RX ADMIN — PANTOPRAZOLE SODIUM 40 MILLIGRAM(S): 20 TABLET, DELAYED RELEASE ORAL at 05:13

## 2021-01-18 RX ADMIN — PANTOPRAZOLE SODIUM 40 MILLIGRAM(S): 20 TABLET, DELAYED RELEASE ORAL at 17:07

## 2021-01-18 RX ADMIN — FINASTERIDE 5 MILLIGRAM(S): 5 TABLET, FILM COATED ORAL at 11:09

## 2021-01-18 RX ADMIN — SODIUM CHLORIDE 3 MILLILITER(S): 9 INJECTION INTRAMUSCULAR; INTRAVENOUS; SUBCUTANEOUS at 22:45

## 2021-01-18 RX ADMIN — SIMETHICONE 80 MILLIGRAM(S): 80 TABLET, CHEWABLE ORAL at 05:13

## 2021-01-18 RX ADMIN — Medication 25 MILLIGRAM(S): at 05:13

## 2021-01-18 RX ADMIN — PREGABALIN 1000 MICROGRAM(S): 225 CAPSULE ORAL at 11:09

## 2021-01-18 RX ADMIN — AMIODARONE HYDROCHLORIDE 200 MILLIGRAM(S): 400 TABLET ORAL at 05:13

## 2021-01-18 RX ADMIN — SODIUM CHLORIDE 3 MILLILITER(S): 9 INJECTION INTRAMUSCULAR; INTRAVENOUS; SUBCUTANEOUS at 14:07

## 2021-01-18 NOTE — PROGRESS NOTE ADULT - ASSESSMENT
63 yo male with PMH of AHA stage D HF 2/2 NICM with HM2 LVAD (on coumadin) as destination therapy due to severe PAD, SIADH who presents after a fall found to have a supratherapeutic INR and anemia. Reported episodes of GI Bleeding butt currently no overt bleeding since admission after correction of INR.    Impression:  # Anemia - patient reports black stool however formed and hard not consistent with bleed; minimal dark brown stool on exam; hgb low however stable and has not required transfusion since admission. Hematology following and smear consistent with LVAD changes  #Chest wall/Abdominal Pain - pain extends from lower left chest down left side of abdomen; worse with movement and significant tenderness on palpation of lower ribs; likely msk in nature; no pathology noted on imaging; possible component of ileus/constipation  # LVAD on AC      Recommendation:  - lower suspicion for bleed at this time given constipation and overall stable but low hemoglobin  - can increase PPI to BID or add H2 blocker trial  - c/w miralax BID and senna at night, can add dulcolax PO if needed  - would also add lidocaine patch at left lower ribs as patient had significant tenderness on palpation  - rest of care per primary team      Susan Jacobo PGY-4  Gastroenterology Fellow  Pager #[phone] or [phone]  Please page on-call Fellow on weekends/after 5 pm on weekdays   Please call answering service for on-call GI fellow ([phone]) after 5pm and before 8am, and on weekends.  63 yo male with PMH of AHA stage D HF 2/2 NICM with HM2 LVAD (on coumadin) as destination therapy due to severe PAD, SIADH who presents after a fall found to have a supratherapeutic INR and anemia. Reported episodes of GI Bleeding butt currently no overt bleeding since admission after correction of INR.    Impression:  # Anemia - patient reports black stool however formed and hard not consistent with bleed; minimal dark brown stool on exam; hgb low however stable and has not required transfusion since admission. Hematology following and smear consistent with LVAD changes  #Chest wall/Abdominal Pain - pain extends from lower left chest down left side of abdomen; worse with movement and significant tenderness on palpation of lower ribs; likely msk in nature; no pathology noted on imaging; possible component of ileus/constipation  # LVAD on AC      Recommendation:  - lower suspicion for bleed at this time given constipation and overall stable but low hemoglobin  - can increase PPI to BID or add H2 blocker trial if needed  - c/w miralax BID and senna at night, can add dulcolax PO if needed  - would also add lidocaine patch at left lower ribs as patient had significant tenderness on palpation  - rest of care per primary team    Will sign off at this time, please reconsult as needed.    Susan Jacobo PGY-4  Gastroenterology Fellow  Pager #[phone] or [phone]  Please page on-call Fellow on weekends/after 5 pm on weekdays   Please call answering service for on-call GI fellow ([phone]) after 5pm and before 8am, and on weekends.

## 2021-01-18 NOTE — PROGRESS NOTE ADULT - SUBJECTIVE AND OBJECTIVE BOX
Interval History:  reports lot of gas  no BM since Friday     Medications:  acetaminophen   Tablet .. 650 milliGRAM(s) Oral every 6 hours PRN  aMIOdarone    Tablet 200 milliGRAM(s) Oral daily  cefTRIAXone   IVPB 2000 milliGRAM(s) IV Intermittent every 12 hours  cyanocobalamin 1000 MICROGram(s) Oral daily  finasteride 5 milliGRAM(s) Oral daily  folic acid 1 milliGRAM(s) Oral daily  gabapentin 100 milliGRAM(s) Oral at bedtime  heparin  Infusion 700 Unit(s)/Hr IV Continuous <Continuous>  influenza   Vaccine 0.5 milliLiter(s) IntraMuscular once  lidocaine   Patch 1 Patch Transdermal daily  metoprolol succinate ER 25 milliGRAM(s) Oral daily  mirtazapine 7.5 milliGRAM(s) Oral at bedtime  pantoprazole    Tablet 40 milliGRAM(s) Oral every 12 hours  polyethylene glycol 3350 17 Gram(s) Oral two times a day  senna 2 Tablet(s) Oral at bedtime  simethicone 80 milliGRAM(s) Chew three times a day  sodium chloride 0.9% lock flush 3 milliLiter(s) IV Push every 8 hours  spironolactone 25 milliGRAM(s) Oral daily  warfarin 5 milliGRAM(s) Oral once      Vitals:  T(C): 36.4 (01-18-21 @ 20:23), Max: 36.8 (01-18-21 @ 05:10)  HR: 83 (01-18-21 @ 20:23) (73 - 83)  BP: --  BP(mean): 84 (01-18-21 @ 13:40) (78 - 88)  RR: 18 (01-18-21 @ 20:23) (18 - 18)  SpO2: 100% (01-18-21 @ 20:23) (100% - 100%)    Daily     Daily         I&O's Summary    17 Jan 2021 07:01  -  18 Jan 2021 07:00  --------------------------------------------------------  IN: 600 mL / OUT: 950 mL / NET: -350 mL    18 Jan 2021 07:01  -  18 Jan 2021 21:11  --------------------------------------------------------  IN: 1017 mL / OUT: 550 mL / NET: 467 mL    Physical Exam:  Appearance: No Acute Distress  HEENT: PERRL  Neck: No JVD  Cardiovascular: Normal S1 S2, No murmurs/rubs/gallops; LVAD hum  Respiratory: Clear to auscultation bilaterally  Gastrointestinal: Soft, TTP along rims 	  Skin: No cyanosis	  Neurologic: Non-focal  Extremities: No LE edema  Psychiatry: A & O x 3, Mood & affect appropriate    Labs:                        8.4    10.39 )-----------( 258      ( 18 Jan 2021 14:09 )             28.5     01-18    134<L>  |  99  |  14  ----------------------------<  91  5.0   |  24  |  1.31<H>    Ca    9.5      18 Jan 2021 14:09    TPro  7.9  /  Alb  x   /  TBili  x   /  DBili  x   /  AST  x   /  ALT  x   /  AlkPhos  x   01-18    PT/INR - ( 18 Jan 2021 05:18 )   PT: 21.0 sec;   INR: 1.80 ratio         PTT - ( 18 Jan 2021 05:18 )  PTT:48.5 sec

## 2021-01-18 NOTE — PROGRESS NOTE ADULT - PROBLEM SELECTOR PLAN 1
- readmitted with H/H 6.5/22 in the setting of INR 2.89. Was transfused with 1uPRBC on 1/11and did responded appropriately however H/H continues to slowly downtrend. Continue to monitor and transfuse as needed   - Fecal Occult negative  - D/c Iron supplements given constipation  - Reticulocyte 1.1 (inapprop response), Haptoglobin wnl  - Hematology consulted and appreciate recommendations  - Pancytopenia is most likely multifactorial, due to slight hemolysis from LVAD and ongoing infection   - HIV, CMV, EBV, SPEP, UPEP all negative

## 2021-01-18 NOTE — PROGRESS NOTE ADULT - ASSESSMENT
64 y/o M w/ h/o stage D NICM/HFrEF s/p HM2 (as DT 9/17 d/t PAD), TV ring, PAD, prior COVID (4/20) who was readmitted with abdominal pain x 4-5 days in setting of constipation as well as recent initiation of doxycycline for driveline drainage. Labs showed patient to be anemic with H/H of 6.5/22 in setting of INR 2.89 although guaiac negative and labs not notable for hemolysis but had pancytopenia. He was transfused with 1uPRBC on 1/11 and responded appropriately however now H/H continues to slowly downtrend. Had febrile episodes (last on 1/12 with Tmax of 101.1). Blood cultures grew Propinobacterium acne (1/2) on 1/11 (possibly contaminant) and UA is negative. He has developed pancytopenia which is most likely multifactorial (may be due to slight hemolysis from LVAD and concerns for ongoing infection).  Medications have been reviewed and no changes made. Peripheral smear was completed, consistent with known LVAD. At this time no signs of LVAD dysfunction or alarms noted at this time.

## 2021-01-18 NOTE — PROGRESS NOTE ADULT - SUBJECTIVE AND OBJECTIVE BOX
Chief Complaint:  Patient is a 65y old  Male who presents with a chief complaint of fevers, anemia, abdominal pain and constipation (2021 11:37)      Interval Events:       Hospital Medications:  acetaminophen   Tablet .. 650 milliGRAM(s) Oral every 6 hours PRN  aMIOdarone    Tablet 200 milliGRAM(s) Oral daily  cefTRIAXone   IVPB 2000 milliGRAM(s) IV Intermittent every 12 hours  cyanocobalamin 1000 MICROGram(s) Oral daily  finasteride 5 milliGRAM(s) Oral daily  folic acid 1 milliGRAM(s) Oral daily  gabapentin 100 milliGRAM(s) Oral at bedtime  heparin  Infusion 700 Unit(s)/Hr IV Continuous <Continuous>  influenza   Vaccine 0.5 milliLiter(s) IntraMuscular once  metoprolol succinate ER 25 milliGRAM(s) Oral daily  mirtazapine 7.5 milliGRAM(s) Oral at bedtime  pantoprazole    Tablet 40 milliGRAM(s) Oral every 12 hours  polyethylene glycol 3350 17 Gram(s) Oral two times a day  senna 2 Tablet(s) Oral at bedtime  simethicone 80 milliGRAM(s) Chew three times a day  sodium chloride 0.9% lock flush 3 milliLiter(s) IV Push every 8 hours  spironolactone 25 milliGRAM(s) Oral daily      PMHX/PSHX:  GI bleed    Vertigo    H/O epistaxis    Iron deficiency anemia    CKD (chronic kidney disease), stage III    Clavicle fracture    Falls    Anticoagulation goal of INR 2.0 to 2.5    ACC/AHA stage D systolic heart failure    BPH with urinary obstruction    Claudication    Peripheral arterial disease    Bleeding hemorrhoids    Hemorrhoids    History of 2019 novel coronavirus disease (COVID-19)    Pleural effusion    Depression    CAD (coronary artery disease)    CHF (congestive heart failure)    No pertinent past medical history    S/P endoscopy    S/P ventricular assist device    S/P TVR (tricuspid valve repair)    No significant past surgical history            ROS:     General:  No weight loss, fevers, chills, night sweats, fatigue   Eyes:  No vision changes  ENT:  No sore throat, pain, runny nose  CV:  No chest pain, palpitations, dizziness   Resp:  No SOB, cough, wheezing  GI:  See HPI  :  No burning with urination, hematuria  Muscle:  No pain, weakness  Neuro:  No weakness/tingling, memory problems  Psych:  No fatigue, insomnia, mood problems, depression  Heme:  No easy bruisability  Skin:  No rash, edema      PHYSICAL EXAM:     GENERAL:  Well developed, no distress  HEENT:  NC/AT,  conjunctivae clear, sclera anicteric  CHEST:  Full & symmetric excursion, no increased effort w/ respirations  HEART:  Regular rhythm & rate  ABDOMEN:  Soft, non-tender, non-distended  EXTREMITIES:  no LE  edema  SKIN:  No rash/erythema/ecchymoses/petechiae/wounds/jaundice  NEURO:  Alert, oriented    Vital Signs:  Vital Signs Last 24 Hrs  T(C): 36.8 (2021 05:10), Max: 36.8 (2021 05:10)  T(F): 98.2 (2021 05:10), Max: 98.2 (2021 05:10)  HR: 81 (2021 00:22) (72 - 81)  BP: 93/65 (2021 18:16) (93/65 - 101/68)  BP(mean): 80 (2021 05:10) (77 - 88)  RR: 18 (2021 05:10) (18 - 18)  SpO2: 100% (2021 05:10) (100% - 100%)  Daily     Daily Weight in k.9 (2021 10:12)    LABS:                        7.8    10.77 )-----------( 232      ( 2021 05:16 )             26.1     -17    134<L>  |  99  |  13  ----------------------------<  93  4.2   |  23  |  1.08    Ca    9.0      2021 05:35    TPro  7.8  /  Alb  x   /  TBili  x   /  DBili  x   /  AST  x   /  ALT  x   /  AlkPhos  x       LIVER FUNCTIONS - ( 2021 00:46 )  Alb: x     / Pro: 7.8 g/dL / ALK PHOS: x     / ALT: x     / AST: x     / GGT: x           PT/INR - ( 2021 05:40 )   PT: 18.6 sec;   INR: 1.58 ratio         PTT - ( 2021 05:18 )  PTT:48.5 sec  Urinalysis Basic - ( 2021 18:34 )    Color: Light Yellow / Appearance: Clear / S.018 / pH: x  Gluc: x / Ketone: Negative  / Bili: Negative / Urobili: Negative   Blood: x / Protein: Trace / Nitrite: Negative   Leuk Esterase: Negative / RBC: x / WBC x   Sq Epi: x / Non Sq Epi: x / Bacteria: x          Imaging:             Chief Complaint:  Patient is a 65y old  Male who presents with a chief complaint of fevers, anemia, abdominal pain and constipation (2021 11:37)      Interval Events: patient reports pain improved today  - reports no BM but denies feeling constipated      Hospital Medications:  acetaminophen   Tablet .. 650 milliGRAM(s) Oral every 6 hours PRN  aMIOdarone    Tablet 200 milliGRAM(s) Oral daily  cefTRIAXone   IVPB 2000 milliGRAM(s) IV Intermittent every 12 hours  cyanocobalamin 1000 MICROGram(s) Oral daily  finasteride 5 milliGRAM(s) Oral daily  folic acid 1 milliGRAM(s) Oral daily  gabapentin 100 milliGRAM(s) Oral at bedtime  heparin  Infusion 700 Unit(s)/Hr IV Continuous <Continuous>  influenza   Vaccine 0.5 milliLiter(s) IntraMuscular once  metoprolol succinate ER 25 milliGRAM(s) Oral daily  mirtazapine 7.5 milliGRAM(s) Oral at bedtime  pantoprazole    Tablet 40 milliGRAM(s) Oral every 12 hours  polyethylene glycol 3350 17 Gram(s) Oral two times a day  senna 2 Tablet(s) Oral at bedtime  simethicone 80 milliGRAM(s) Chew three times a day  sodium chloride 0.9% lock flush 3 milliLiter(s) IV Push every 8 hours  spironolactone 25 milliGRAM(s) Oral daily      PMHX/PSHX:  GI bleed    Vertigo    H/O epistaxis    Iron deficiency anemia    CKD (chronic kidney disease), stage III    Clavicle fracture    Falls    Anticoagulation goal of INR 2.0 to 2.5    ACC/AHA stage D systolic heart failure    BPH with urinary obstruction    Claudication    Peripheral arterial disease    Bleeding hemorrhoids    Hemorrhoids    History of 2019 novel coronavirus disease (COVID-19)    Pleural effusion    Depression    CAD (coronary artery disease)    CHF (congestive heart failure)    No pertinent past medical history    S/P endoscopy    S/P ventricular assist device    S/P TVR (tricuspid valve repair)    No significant past surgical history            ROS:     General:  No weight loss, fevers, chills, night sweats, fatigue   Eyes:  No vision changes  ENT:  No sore throat, pain, runny nose  CV:  No chest pain, palpitations, dizziness   Resp:  No SOB, cough, wheezing  GI:  See HPI  :  No burning with urination, hematuria  Muscle:  No pain, weakness  Neuro:  No weakness/tingling, memory problems  Psych:  No fatigue, insomnia, mood problems, depression  Heme:  No easy bruisability  Skin:  No rash, edema      PHYSICAL EXAM:     GENERAL:  Well developed, no distress  HEENT:  NC/AT,  conjunctivae clear, sclera anicteric  CHEST:  Full & symmetric excursion, no increased effort w/ respirations  HEART:  Regular rhythm & rate  ABDOMEN:  Soft, non-tender, non-distended  EXTREMITIES:  no LE  edema  SKIN:  No rash/erythema/ecchymoses/petechiae/wounds/jaundice  NEURO:  Alert, oriented    Vital Signs:  Vital Signs Last 24 Hrs  T(C): 36.8 (2021 05:10), Max: 36.8 (2021 05:10)  T(F): 98.2 (2021 05:10), Max: 98.2 (2021 05:10)  HR: 81 (2021 00:22) (72 - 81)  BP: 93/65 (2021 18:16) (93/65 - 101/68)  BP(mean): 80 (2021 05:10) (77 - 88)  RR: 18 (2021 05:10) (18 - 18)  SpO2: 100% (2021 05:10) (100% - 100%)  Daily     Daily Weight in k.9 (2021 10:12)    LABS:                        7.8    10.77 )-----------( 232      ( 2021 05:16 )             26.1     -17    134<L>  |  99  |  13  ----------------------------<  93  4.2   |  23  |  1.08    Ca    9.0      2021 05:35    TPro  7.8  /  Alb  x   /  TBili  x   /  DBili  x   /  AST  x   /  ALT  x   /  AlkPhos  x       LIVER FUNCTIONS - ( 2021 00:46 )  Alb: x     / Pro: 7.8 g/dL / ALK PHOS: x     / ALT: x     / AST: x     / GGT: x           PT/INR - ( 2021 05:40 )   PT: 18.6 sec;   INR: 1.58 ratio         PTT - ( 2021 05:18 )  PTT:48.5 sec  Urinalysis Basic - ( 2021 18:34 )    Color: Light Yellow / Appearance: Clear / S.018 / pH: x  Gluc: x / Ketone: Negative  / Bili: Negative / Urobili: Negative   Blood: x / Protein: Trace / Nitrite: Negative   Leuk Esterase: Negative / RBC: x / WBC x   Sq Epi: x / Non Sq Epi: x / Bacteria: x          Imaging:

## 2021-01-18 NOTE — PROGRESS NOTE ADULT - PROBLEM SELECTOR PLAN 3
- Continue with current speed (8800 rpm)  - on Heparin gtt for subtherapeutic INR   - Continue Coumadin for anticoagulation (INR goal 2-2.5). Holding ASA at this time for low H/H on admission, will resume when feasible.   - Continue to trend LDH levels daily.

## 2021-01-18 NOTE — PROGRESS NOTE ADULT - ASSESSMENT
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 coli UTIs and COVID-19 (back in April 2020). Overall patient has been progressing well. He was recently seen in clinic where he complained of abdominal pain and constipation. He was started on Doxycycline 1/7/21 due to driveline drainage and labs last week were unremarkable. He presents to [facility] ER stating that for the past 4 days that his abdominal pain has gotten worse, still not having bowel movements despite being on stool softeners and now having lower back pain. Todays labs show patient is anemic with H/H of 6.5/22, fecal occult pending. INR is 2.89. He is febrile with Tmax of 101, saturating on 99% room air, MAP in the 70s. He denies any chest pain, shortness of breath, dizziness, changes in urination or muscle aches.   1/12 INR 3.0  Followed by heart failure team  NPO until GI bleed source ruled out> CT neg  1/13 VSS; INR 2.5 - resume coumadin 1 mg this evening as per LVAD team ; h/h stable 8/29 today; bc neg 1/11 and ua neg; heme consulted today for pancytopenia   continue to observe off abx ; ID following   1/14  INR   1.76  coumadin 2.5 mg,   await heme cslt   HF following  1/15 INR  1.5  Coumadin 3  mg   Heparin gtt started as bridge  1/16 VSS, NAD. INR 1.58, will dose 3 mg Coumadin tonight and continue heparin gtt as bridge. H/H this AM dropped from 8/26.3 --> 7/23.2 - pt asx, reports dark BM yesterday - will repeat CBC in afternoon as per Dr. Son. BCx from 1/11 now (+) for cultebacterium acnes - pt has been afebrile since 1/12, ID following. Will repeat BCx x 2 as per Dr. Son and f/u w/ ID. As pt reports having a dark BM yesterday and notes continued L sided abd pain - will repeat stool guaiac and place GI c/s per Dr. Son given concern for GIB and/or diverticular disease. SPEP, UPEP and haptoglobin pending. Awaiting further heme recommendations.    1/17 Coumadin 5 mg Continue Heparin gtt bridge  Mgmt as per heart failure  Blood C/S pending  Continue empiric abx  1/18 laxatives for constipation, pending guiac stool. H/H stable. Maintain PPI bid. Lidocaine patch for left lat torso pain. afebrile. WBC wnl.   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 coli UTIs and COVID-19 (back in April 2020). Overall patient has been progressing well. He was recently seen in clinic where he complained of abdominal pain and constipation. He was started on Doxycycline 1/7/21 due to driveline drainage and labs last week were unremarkable. He presents to [facility] ER stating that for the past 4 days that his abdominal pain has gotten worse, still not having bowel movements despite being on stool softeners and now having lower back pain. Todays labs show patient is anemic with H/H of 6.5/22, fecal occult pending. INR is 2.89. He is febrile with Tmax of 101, saturating on 99% room air, MAP in the 70s. He denies any chest pain, shortness of breath, dizziness, changes in urination or muscle aches.   1/12 INR 3.0  Followed by heart failure team  NPO until GI bleed source ruled out> CT neg  1/13 VSS; INR 2.5 - resume coumadin 1 mg this evening as per LVAD team ; h/h stable 8/29 today; bc neg 1/11 and ua neg; heme consulted today for pancytopenia   continue to observe off abx ; ID following   1/14  INR   1.76  coumadin 2.5 mg,   await heme cslt   HF following  1/15 INR  1.5  Coumadin 3  mg   Heparin gtt started as bridge  1/16 VSS, NAD. INR 1.58, will dose 3 mg Coumadin tonight and continue heparin gtt as bridge. H/H this AM dropped from 8/26.3 --> 7/23.2 - pt asx, reports dark BM yesterday - will repeat CBC in afternoon as per Dr. Son. BCx from 1/11 now (+) for cultebacterium acnes - pt has been afebrile since 1/12, ID following. Will repeat BCx x 2 as per Dr. Son and f/u w/ ID. As pt reports having a dark BM yesterday and notes continued L sided abd pain - will repeat stool guaiac and place GI c/s per Dr. Son given concern for GIB and/or diverticular disease. SPEP, UPEP and haptoglobin pending. Awaiting further heme recommendations.    1/17 Coumadin 5 mg Continue Heparin gtt bridge  Mgmt as per heart failure  Blood C/S pending  Continue empiric abx  1/18 laxatives for constipation, pending guiac stool. H/H stable. Maintain PPI bid. Lidocaine patch for left lat torso pain. afebrile. WBC wnl. BC 1/16 neg to date. Continue with ceftriaxone as per ID

## 2021-01-18 NOTE — PROGRESS NOTE ADULT - PROBLEM SELECTOR PLAN 5
- Admitted with constipation. Of note was on Miralax and Senna as outpatient. Has been given Mag Citrate x 2 (has had 2 BM's since admission)  - Abdominal X-ray 1/11 shows nonobstructive bowel gas pattern with Large stool burden noted throughout the colon.

## 2021-01-18 NOTE — PROVIDER CONTACT NOTE (OTHER) - SITUATION
PTT 48.5  Heparin therapeutic x2 at 7ml/hr PTT 48.5  Heparin therapeutic x2 at 7ml/hr. Target range 50-60.

## 2021-01-18 NOTE — PROGRESS NOTE ADULT - PROBLEM SELECTOR PLAN 4
- Remains afebrile. Last febrile episode was 1/12 with Tmax of 101.1  - ID consulted, appreciate recommendations  - Blood cultures 1/11 with Propinobacterium acnes; will f/u with Dr. Prater re: treatment plan  - UA 1/12 negative. Will recheck UA and UCx in case he passed a renal calculus  - Urine histo ag pending  - Quantiferon negative   - COVID IGG ab positive, hx of COVID in April 2020  - Strongyloides ab pending   - CT C/A/P 1/11 shows no evidence of acute pathology in chest of abdomen

## 2021-01-18 NOTE — PROGRESS NOTE ADULT - SUBJECTIVE AND OBJECTIVE BOX
VITAL SIGNS    Telemetry:  SR 80's  Vital Signs Last 24 Hrs  T(C): 36.8 (21 @ 05:10), Max: 36.8 (21 @ 05:10)  T(F): 98.2 (21 @ 05:10), Max: 98.2 (21 @ 05:10)  HR: 80 (21 @ 09:20) (72 - 81)  BP: 93/65 (21 @ 18:16) (93/65 - 101/68)  RR: 18 (21 @ 05:10) (18 - 18)  SpO2: 100% (21 @ 05:10) (100% - 100%)             @ 07:01  -   @ 07:00  --------------------------------------------------------  IN: 600 mL / OUT: 950 mL / NET: -350 mL       Daily     Daily Weight in k.9 (2021 10:12)  Admit Wt: Drug Dosing Weight  Height (cm): 177.8 (2021 09:28)  Weight (kg): 74 (2021 10:48)  BMI (kg/m2): 23.4 (2021 10:48)  BSA (m2): 1.91 (2021 10:48)    MEDICATIONS  acetaminophen   Tablet .. 650 milliGRAM(s) Oral every 6 hours PRN  aMIOdarone    Tablet 200 milliGRAM(s) Oral daily  cefTRIAXone   IVPB 2000 milliGRAM(s) IV Intermittent every 12 hours  cyanocobalamin 1000 MICROGram(s) Oral daily  finasteride 5 milliGRAM(s) Oral daily  folic acid 1 milliGRAM(s) Oral daily  gabapentin 100 milliGRAM(s) Oral at bedtime  heparin  Infusion 700 Unit(s)/Hr IV Continuous <Continuous>  influenza   Vaccine 0.5 milliLiter(s) IntraMuscular once  lidocaine   Patch 1 Patch Transdermal daily  metoprolol succinate ER 25 milliGRAM(s) Oral daily  mirtazapine 7.5 milliGRAM(s) Oral at bedtime  pantoprazole    Tablet 40 milliGRAM(s) Oral every 12 hours  polyethylene glycol 3350 17 Gram(s) Oral two times a day  senna 2 Tablet(s) Oral at bedtime  simethicone 80 milliGRAM(s) Chew three times a day  sodium chloride 0.9% lock flush 3 milliLiter(s) IV Push every 8 hours  spironolactone 25 milliGRAM(s) Oral daily      >>> <<<  PHYSICAL EXAM  Subjective: NAD denies blood in stool  Neurology: alert and oriented x 3, nonfocal, no gross deficits  CV : lvad  Lungs: CTA b/l  Abdomen: soft, NT,ND, (- ) BM  driveline c/d/i  :  voiding  Extremities:   -c/c/e    LABS      134<L>  |  99  |  13  ----------------------------<  93  4.2   |  23  |  1.08    Ca    9.0      2021 05:35    TPro  7.8  /  Alb  x   /  TBili  x   /  DBili  x   /  AST  x   /  ALT  x   /  AlkPhos  x                                    7.8    10.77 )-----------( 232      ( 2021 05:16 )             26.1          PT/INR - ( 2021 05:40 )   PT: 18.6 sec;   INR: 1.58 ratio         PTT - ( 2021 05:18 )  PTT:48.5 sec       PAST MEDICAL & SURGICAL HISTORY:  GI bleed    Vertigo    H/O epistaxis    Iron deficiency anemia    CKD (chronic kidney disease), stage III    Clavicle fracture    Falls    Anticoagulation goal of INR 2.0 to 2.5    ACC/AHA stage D systolic heart failure    BPH with urinary obstruction    Claudication    Peripheral arterial disease    Bleeding hemorrhoids    Hemorrhoids    History of 2019 novel coronavirus disease (COVID-19)  2020    Pleural effusion    Depression    CAD (coronary artery disease)    CHF (congestive heart failure)    S/P endoscopy    S/P ventricular assist device    S/P TVR (tricuspid valve repair)

## 2021-01-19 ENCOUNTER — TRANSCRIPTION ENCOUNTER (OUTPATIENT)
Age: 66
End: 2021-01-19

## 2021-01-19 LAB
% ALBUMIN: 49.8 % — SIGNIFICANT CHANGE UP
% ALPHA 1: 4.9 % — SIGNIFICANT CHANGE UP
% ALPHA 2: 8.5 % — SIGNIFICANT CHANGE UP
% BETA: 13.7 % — SIGNIFICANT CHANGE UP
% GAMMA: 23.1 % — SIGNIFICANT CHANGE UP
ALBUMIN SERPL ELPH-MCNC: 3.9 G/DL — SIGNIFICANT CHANGE UP (ref 3.6–5.5)
ALBUMIN/GLOB SERPL ELPH: 1 RATIO — SIGNIFICANT CHANGE UP
ALPHA1 GLOB SERPL ELPH-MCNC: 0.4 G/DL — SIGNIFICANT CHANGE UP (ref 0.1–0.4)
ALPHA2 GLOB SERPL ELPH-MCNC: 0.7 G/DL — SIGNIFICANT CHANGE UP (ref 0.5–1)
ANION GAP SERPL CALC-SCNC: 10 MMOL/L — SIGNIFICANT CHANGE UP (ref 5–17)
APTT BLD: 68.5 SEC — HIGH (ref 27.5–35.5)
B-GLOBULIN SERPL ELPH-MCNC: 1.1 G/DL — HIGH (ref 0.5–1)
BLD GP AB SCN SERPL QL: NEGATIVE — SIGNIFICANT CHANGE UP
BUN SERPL-MCNC: 17 MG/DL — SIGNIFICANT CHANGE UP (ref 7–23)
CALCIUM SERPL-MCNC: 8.6 MG/DL — SIGNIFICANT CHANGE UP (ref 8.4–10.5)
CHLORIDE SERPL-SCNC: 99 MMOL/L — SIGNIFICANT CHANGE UP (ref 96–108)
CO2 SERPL-SCNC: 24 MMOL/L — SIGNIFICANT CHANGE UP (ref 22–31)
CREAT SERPL-MCNC: 1.2 MG/DL — SIGNIFICANT CHANGE UP (ref 0.5–1.3)
GAMMA GLOBULIN: 1.8 G/DL — HIGH (ref 0.6–1.6)
GLUCOSE SERPL-MCNC: 112 MG/DL — HIGH (ref 70–99)
HCT VFR BLD CALC: 24.9 % — LOW (ref 39–50)
HCT VFR BLD CALC: 27 % — LOW (ref 39–50)
HGB BLD-MCNC: 7.4 G/DL — LOW (ref 13–17)
HGB BLD-MCNC: 7.9 G/DL — LOW (ref 13–17)
HTLV I+II AB PATRN SER RIPA-IMP: SIGNIFICANT CHANGE UP
INR BLD: 2.07 RATIO — HIGH (ref 0.88–1.16)
MCHC RBC-ENTMCNC: 23.3 PG — LOW (ref 27–34)
MCHC RBC-ENTMCNC: 23.4 PG — LOW (ref 27–34)
MCHC RBC-ENTMCNC: 29.3 GM/DL — LOW (ref 32–36)
MCHC RBC-ENTMCNC: 29.7 GM/DL — LOW (ref 32–36)
MCV RBC AUTO: 78.8 FL — LOW (ref 80–100)
MCV RBC AUTO: 79.6 FL — LOW (ref 80–100)
NRBC # BLD: 8 /100 WBCS — HIGH (ref 0–0)
NRBC # BLD: 8 /100 WBCS — HIGH (ref 0–0)
PLATELET # BLD AUTO: 240 K/UL — SIGNIFICANT CHANGE UP (ref 150–400)
PLATELET # BLD AUTO: 253 K/UL — SIGNIFICANT CHANGE UP (ref 150–400)
POTASSIUM SERPL-MCNC: 4.2 MMOL/L — SIGNIFICANT CHANGE UP (ref 3.5–5.3)
POTASSIUM SERPL-SCNC: 4.2 MMOL/L — SIGNIFICANT CHANGE UP (ref 3.5–5.3)
PROT PATTERN SERPL ELPH-IMP: SIGNIFICANT CHANGE UP
PROTHROM AB SERPL-ACNC: 24 SEC — HIGH (ref 10.6–13.6)
RBC # BLD: 3.16 M/UL — LOW (ref 4.2–5.8)
RBC # BLD: 3.39 M/UL — LOW (ref 4.2–5.8)
RBC # FLD: 18.2 % — HIGH (ref 10.3–14.5)
RBC # FLD: 18.5 % — HIGH (ref 10.3–14.5)
RH IG SCN BLD-IMP: POSITIVE — SIGNIFICANT CHANGE UP
SARS-COV-2 RNA SPEC QL NAA+PROBE: SIGNIFICANT CHANGE UP
SODIUM SERPL-SCNC: 133 MMOL/L — LOW (ref 135–145)
STRONGYLOIDES AB SER-ACNC: NEGATIVE — SIGNIFICANT CHANGE UP
WBC # BLD: 8.92 K/UL — SIGNIFICANT CHANGE UP (ref 3.8–10.5)
WBC # BLD: 9.4 K/UL — SIGNIFICANT CHANGE UP (ref 3.8–10.5)
WBC # FLD AUTO: 8.92 K/UL — SIGNIFICANT CHANGE UP (ref 3.8–10.5)
WBC # FLD AUTO: 9.4 K/UL — SIGNIFICANT CHANGE UP (ref 3.8–10.5)

## 2021-01-19 PROCEDURE — 99232 SBSQ HOSP IP/OBS MODERATE 35: CPT

## 2021-01-19 PROCEDURE — 93750 INTERROGATION VAD IN PERSON: CPT

## 2021-01-19 PROCEDURE — 99233 SBSQ HOSP IP/OBS HIGH 50: CPT | Mod: 25

## 2021-01-19 PROCEDURE — 74018 RADEX ABDOMEN 1 VIEW: CPT | Mod: 26

## 2021-01-19 RX ORDER — MULTIVIT WITH MIN/MFOLATE/K2 340-15/3 G
1 POWDER (GRAM) ORAL ONCE
Refills: 0 | Status: COMPLETED | OUTPATIENT
Start: 2021-01-19 | End: 2021-01-19

## 2021-01-19 RX ORDER — WARFARIN SODIUM 2.5 MG/1
3 TABLET ORAL ONCE
Refills: 0 | Status: COMPLETED | OUTPATIENT
Start: 2021-01-19 | End: 2021-01-19

## 2021-01-19 RX ORDER — LACTULOSE 10 G/15ML
10 SOLUTION ORAL ONCE
Refills: 0 | Status: COMPLETED | OUTPATIENT
Start: 2021-01-19 | End: 2021-01-19

## 2021-01-19 RX ADMIN — PANTOPRAZOLE SODIUM 40 MILLIGRAM(S): 20 TABLET, DELAYED RELEASE ORAL at 17:24

## 2021-01-19 RX ADMIN — Medication 10 MILLIGRAM(S): at 12:10

## 2021-01-19 RX ADMIN — PANTOPRAZOLE SODIUM 40 MILLIGRAM(S): 20 TABLET, DELAYED RELEASE ORAL at 05:37

## 2021-01-19 RX ADMIN — PREGABALIN 1000 MICROGRAM(S): 225 CAPSULE ORAL at 12:10

## 2021-01-19 RX ADMIN — SODIUM CHLORIDE 3 MILLILITER(S): 9 INJECTION INTRAMUSCULAR; INTRAVENOUS; SUBCUTANEOUS at 05:37

## 2021-01-19 RX ADMIN — GABAPENTIN 100 MILLIGRAM(S): 400 CAPSULE ORAL at 22:07

## 2021-01-19 RX ADMIN — SIMETHICONE 80 MILLIGRAM(S): 80 TABLET, CHEWABLE ORAL at 22:07

## 2021-01-19 RX ADMIN — CEFTRIAXONE 100 MILLIGRAM(S): 500 INJECTION, POWDER, FOR SOLUTION INTRAMUSCULAR; INTRAVENOUS at 17:24

## 2021-01-19 RX ADMIN — WARFARIN SODIUM 3 MILLIGRAM(S): 2.5 TABLET ORAL at 22:07

## 2021-01-19 RX ADMIN — Medication 1 BOTTLE: at 16:20

## 2021-01-19 RX ADMIN — Medication 1 MILLIGRAM(S): at 12:10

## 2021-01-19 RX ADMIN — Medication 25 MILLIGRAM(S): at 05:37

## 2021-01-19 RX ADMIN — CEFTRIAXONE 100 MILLIGRAM(S): 500 INJECTION, POWDER, FOR SOLUTION INTRAMUSCULAR; INTRAVENOUS at 05:49

## 2021-01-19 RX ADMIN — SODIUM CHLORIDE 3 MILLILITER(S): 9 INJECTION INTRAMUSCULAR; INTRAVENOUS; SUBCUTANEOUS at 12:08

## 2021-01-19 RX ADMIN — POLYETHYLENE GLYCOL 3350 17 GRAM(S): 17 POWDER, FOR SOLUTION ORAL at 17:24

## 2021-01-19 RX ADMIN — SPIRONOLACTONE 25 MILLIGRAM(S): 25 TABLET, FILM COATED ORAL at 05:37

## 2021-01-19 RX ADMIN — SENNA PLUS 2 TABLET(S): 8.6 TABLET ORAL at 22:07

## 2021-01-19 RX ADMIN — SIMETHICONE 80 MILLIGRAM(S): 80 TABLET, CHEWABLE ORAL at 05:37

## 2021-01-19 RX ADMIN — SIMETHICONE 80 MILLIGRAM(S): 80 TABLET, CHEWABLE ORAL at 13:25

## 2021-01-19 RX ADMIN — FINASTERIDE 5 MILLIGRAM(S): 5 TABLET, FILM COATED ORAL at 12:10

## 2021-01-19 RX ADMIN — SODIUM CHLORIDE 3 MILLILITER(S): 9 INJECTION INTRAMUSCULAR; INTRAVENOUS; SUBCUTANEOUS at 21:16

## 2021-01-19 RX ADMIN — AMIODARONE HYDROCHLORIDE 200 MILLIGRAM(S): 400 TABLET ORAL at 05:37

## 2021-01-19 RX ADMIN — MIRTAZAPINE 7.5 MILLIGRAM(S): 45 TABLET, ORALLY DISINTEGRATING ORAL at 22:07

## 2021-01-19 NOTE — PROGRESS NOTE ADULT - ASSESSMENT
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 coli UTIs and COVID-19 (back in April 2020). Overall patient has been progressing well. He was recently seen in clinic where he complained of abdominal pain and constipation. He was started on Doxycycline 1/7/21 due to driveline drainage and labs last week were unremarkable. He presents to [facility] ER stating that for the past 4 days that his abdominal pain has gotten worse, still not having bowel movements despite being on stool softeners and now having lower back pain. Todays labs show patient is anemic with H/H of 6.5/22, fecal occult pending. INR is 2.89. He is febrile with Tmax of 101, saturating on 99% room air, MAP in the 70s. He denies any chest pain, shortness of breath, dizziness, changes in urination or muscle aches.   1/12 INR 3.0  Followed by heart failure team  NPO until GI bleed source ruled out> CT neg  1/13 VSS; INR 2.5 - resume coumadin 1 mg this evening as per LVAD team ; h/h stable 8/29 today; bc neg 1/11 and ua neg; heme consulted today for pancytopenia   continue to observe off abx ; ID following   1/14  INR   1.76  coumadin 2.5 mg,   await heme cslt   HF following  1/15 INR  1.5  Coumadin 3  mg   Heparin gtt started as bridge  1/16 VSS, NAD. INR 1.58, will dose 3 mg Coumadin tonight and continue heparin gtt as bridge. H/H this AM dropped from 8/26.3 --> 7/23.2 - pt asx, reports dark BM yesterday - will repeat CBC in afternoon as per Dr. Son. BCx from 1/11 now (+) for cultebacterium acnes - pt has been afebrile since 1/12, ID following. Will repeat BCx x 2 as per Dr. Son and f/u w/ ID. As pt reports having a dark BM yesterday and notes continued L sided abd pain - will repeat stool guaiac and place GI c/s per Dr. Son given concern for GIB and/or diverticular disease. SPEP, UPEP and haptoglobin pending. Awaiting further heme recommendations.    1/17 Coumadin 5 mg Continue Heparin gtt bridge  Mgmt as per heart failure  Blood C/S pending  Continue empiric abx  1/18 laxatives for constipation, pending guiac stool. H/H stable. Maintain PPI bid. Lidocaine patch for left lat torso pain. afebrile. WBC wnl. BC 1/16 neg to date. Continue with ceftriaxone as per ID  1/19 Laxatives for constipation. H/H 7.4/24.9 --> 1 Unit PRBC. Maintain PPI BID. Heparin gtt d'cd, Coumadin 3mg tonight INR 2.07.   No PICC line needed, transition Rocephin to PO doxy and Augmentin starting 1/20/20. Plan for discharge home tomorrow Wed.

## 2021-01-19 NOTE — PROGRESS NOTE ADULT - ASSESSMENT
66 y/o M w/ h/o stage D NICM/HFrEF s/p HM2 (as DT 9/17 d/t PAD), TV ring, PAD, prior COVID (4/20) who was readmitted with abdominal pain x 4-5 days in setting of constipation as well as recent initiation of doxycycline for driveline drainage. Labs showed patient to be anemic with H/H of 6.5/22 in setting of INR 2.89 although guaiac negative and labs not notable for hemolysis but had pancytopenia. He was transfused with 1uPRBC on 1/11 and responded appropriately however now H/H continues to slowly downtrend. Had febrile episodes (last on 1/12 with Tmax of 101.1). Blood cultures grew Propinobacterium acne (1/2 bottles) on 1/11 (possibly contaminant) and UA is negative. Currently is on Ceftriaxone. He has developed pancytopenia which is most likely multifactorial (may be due to slight hemolysis from LVAD and concerns for ongoing infection).  Medications have been reviewed and no changes made. Peripheral smear was completed, consistent with known LVAD. At this time no signs of LVAD dysfunction or alarms noted at this time.  66 y/o M w/ h/o stage D NICM/HFrEF s/p HM2 (as DT 9/17 d/t PAD), TV ring, PAD, prior COVID (4/20) who was readmitted with abdominal pain x 4-5 days in setting of constipation as well as recent initiation of doxycycline for driveline drainage. Labs showed patient to be anemic with H/H of 6.5/22 in setting of INR 2.89 although guaiac negative and labs not notable for hemolysis but had pancytopenia. He was transfused with 1uPRBC on 1/11 and responded appropriately however remains anemic. Will be give additional unit of PRBC today. Had febrile episodes (last on 1/12 with Tmax of 101.1). Blood cultures grew Propinobacterium acne (1/2 bottles) on 1/11 (possibly contaminant) and UA is negative. Currently is on Ceftriaxone and will be transition to PO antibiotics the day of discharge. He has developed pancytopenia which is most likely multifactorial (may be due to slight hemolysis from LVAD and concerns for ongoing infection).  Medications have been reviewed and no changes made. Peripheral smear was completed, consistent with known LVAD. At this time no signs of LVAD dysfunction or alarms noted at this time. Plan to discharge home tomorrow.

## 2021-01-19 NOTE — DISCHARGE NOTE PROVIDER - NSDCFUSCHEDAPPT_GEN_ALL_CORE_FT
JOINT, RICKY ; 01/21/2021 ; [facility] [address] RICKY Dow ; 01/22/2021 ; [facility] [address]

## 2021-01-19 NOTE — DISCHARGE NOTE PROVIDER - NSDCPNSUBOBJ_GEN_ALL_CORE
Subjective: Patient seen and examined resting in bed. ON no issues.     Medications:  acetaminophen   Tablet .. 650 milliGRAM(s) Oral every 6 hours PRN  aMIOdarone    Tablet 200 milliGRAM(s) Oral daily  cyanocobalamin 1000 MICROGram(s) Oral daily  finasteride 5 milliGRAM(s) Oral daily  folic acid 1 milliGRAM(s) Oral daily  gabapentin 100 milliGRAM(s) Oral at bedtime  influenza   Vaccine 0.5 milliLiter(s) IntraMuscular once  lidocaine   Patch 1 Patch Transdermal daily  metoprolol succinate ER 25 milliGRAM(s) Oral daily  mirtazapine 7.5 milliGRAM(s) Oral at bedtime  pantoprazole    Tablet 40 milliGRAM(s) Oral every 12 hours  polyethylene glycol 3350 17 Gram(s) Oral two times a day  senna 2 Tablet(s) Oral at bedtime  simethicone 80 milliGRAM(s) Chew three times a day  sodium chloride 0.9% lock flush 3 milliLiter(s) IV Push every 8 hours  spironolactone 25 milliGRAM(s) Oral daily    Vitals:  Vital Signs Last 24 Hours  T(C): 36.4 (01-20-21 @ 05:45), Max: 36.6 (01-19-21 @ 19:17)  HR: 74 (01-20-21 @ 05:45) (74 - 82)  BP: 106/71 (01-19-21 @ 19:17) (102/74 - 107/72)  RR: 18 (01-20-21 @ 05:45) (17 - 18)  SpO2: 99% (01-20-21 @ 05:45) (99% - 100%)        I&O's Summary    19 Jan 2021 07:01  -  20 Jan 2021 07:00  --------------------------------------------------------  IN: 450 mL / OUT: 295 mL / NET: 155 mL        Physical Exam  General: No distress. Comfortable.  HEENT: EOM intact.  Neck: Neck supple. JVP not elevated. No masses  Chest: Clear to auscultation bilaterally  CV: +VAD hum  Abdomen: Soft, non-distended, non-tender, +BS  Skin: No rashes or skin breakdown  Neurology: Alert and oriented times three. Sensation intact  Psych: Affect normal    LVAD Interrogation  VAD TYPE HM 2  Speed  8800  Flow 4.5  Power 4.8  PI  6.8  Assessment of driveline exit site: driveline dressing c/d/i  Programming changes: no changes made    Labs:                        9.7    7.92  )-----------( 210      ( 20 Jan 2021 05:07 )             31.6     01-20    132<L>  |  98  |  19  ----------------------------<  105<H>  4.4   |  22  |  1.24    Ca    9.3      20 Jan 2021 05:07    TPro  7.7  /  Alb  4.0  /  TBili  0.2  /  DBili  x   /  AST  58<H>  /  ALT  46<H>  /  AlkPhos  74  01-20    PT/INR - ( 20 Jan 2021 05:07 )   PT: 23.4 sec;   INR: 2.02 ratio         PTT - ( 20 Jan 2021 05:07 )  PTT:32.3 sec      Lactate Dehydrogenase, Serum: 427 U/L (01-20 @ 05:07)        Imaging Studies     < from: Xray Abdomen 1 View PORTABLE -Urgent (Xray Abdomen 1 View PORTABLE -Urgent .) (01.19.21 @ 08:53) >    IMPRESSION: Nonobstructive bowel gas pattern. Large stool burden.    < end of copied text >      < from: CT Abdomen and Pelvis No Cont (01.11.21 @ 14:10) >    IMPRESSION:    No evidence of acute pathology in the chest or abdomen.    In regards to clinical questions, thereis no evidence of infection or hemorrhage.    < end of copied text >

## 2021-01-19 NOTE — PROGRESS NOTE ADULT - ATTENDING COMMENTS
66 YO M with a history of stage D NICM s/p HM2 on 9/2017 as DT (due to severe PAD) with TV ring, prior COVID-19 infection 4/2020, recurrent syncope post LVAD s/p ILR, and chronic abdominal pain with prior negative workup who was admitted with abdominal pain in setting of constipation. CT abdomen performed without any acute pathology. He also developed fevers 1/12 with negative infectious workup aside from a single blood culture positive for propionobacterium acnes and found to have pancytopenia also with negative workup and has now resolved with just stable microcytic anemia at this time. GI consulted and there is low suspicion for GIB requiring repeat endoscopic workup or GI etiology of his chronic pain (previously performed and negative).    Will transfuse 1 u PRBC as his Hgb has slowly drifted down with phlebotomy. There is no signs of active bleeding. Will tentatively plan to discharge tomorrow with close followup in VAD clinic.

## 2021-01-19 NOTE — PROVIDER CONTACT NOTE (OTHER) - RECOMMENDATIONS
As per NP, change heparin gtt to 7ml/hr. Repeat Ptt & CBC in 6 hours.
Provider notified.
As per CLAUDIA Starks, keep heparin at 7ml/hr
As per CTSx, keep heparin at 7ml/hr
As per CTsx, keep heparin at 7ml/hr. Repeat aPTT and CBC in 6 hours
As per NP, keep heparin gtt at 7ml/hr. Repeat Ptt & CBC in 6 hours.
RN will continue to monitor.
RN will continue to monitor.

## 2021-01-19 NOTE — PROGRESS NOTE ADULT - PROBLEM SELECTOR PLAN 5
- Admitted with constipation. Of note was on Miralax and Senna as outpatient. Has been given Mag Citrate x 2 (has had 2 BM's since admission)  - Abdominal X-ray 1/11 shows nonobstructive bowel gas pattern with Large stool burden noted throughout the colon. - Admitted with constipation. Of note was on Miralax and Senna as outpatient. Has been given Mag Citrate x 2  - Abdominal X-ray 1/11 shows nonobstructive bowel gas pattern with Large stool burden noted throughout the colon.

## 2021-01-19 NOTE — DISCHARGE NOTE PROVIDER - HOSPITAL COURSE
64 y/o M w/ h/o stage D NICM/HFrEF s/p HM2 (as DT 9/17 d/t PAD), TV ring, PAD, prior COVID (4/20) who was readmitted with abdominal pain x 4-5 days in setting of constipation as well as recent initiation of doxycycline for driveline drainage. Labs showed patient to be anemic with H/H of 6.5/22 in setting of INR 2.89 although guaiac negative and labs not notable for hemolysis but had pancytopenia. He was transfused with 1uPRBC on 1/11 and responded appropriately however remains anemic. Was given additional unit of PRBC on 1/19. Had febrile episodes (last on 1/12 with Tmax of 101.1). Blood cultures grew Propinobacterium acne (1/2 bottles) on 1/11 (possibly contaminant) and UA is negative. Was transition from Ceftriazone to PO ________, will complete 14 day course (ends _______) . He has developed pancytopenia which is most likely multifactorial (may be due to slight hemolysis from LVAD and concerns for ongoing infection).  Medications have been reviewed and no changes made. Peripheral smear was completed, consistent with known LVAD. At this time no signs of LVAD dysfunction or alarms noted at this time. Remains hemodynamically stable and will be discharged home today. 64 YO M with a history of stage D NICM s/p HM2 on 9/2017 as DT (due to severe PAD) with TV ring, prior COVID-19 infection 4/2020, recurrent syncope post LVAD s/p ILR, and chronic abdominal pain with prior negative workup who was admitted with abdominal pain in setting of constipation. CT abdomen performed without any acute pathology. He also developed fevers 1/12 with negative infectious workup aside from a single blood culture positive for propionobacterium acnes and found to have pancytopenia also with negative workup and has now resolved with just stable microcytic anemia at this time. Was transfused with 2uPRBC (last on 1/19). GI consulted and there is low suspicion for GIB, he does not require repeat endoscopic workup at this time (was previously performed and negative). At this time no signs of LVAD dysfunction or alarms noted at this time. Remains hemodynamically stable and will be discharged home today.

## 2021-01-19 NOTE — PROGRESS NOTE ADULT - SUBJECTIVE AND OBJECTIVE BOX
INFECTIOUS DISEASES FOLLOW UP-- Anitra Prater  [phone]    This is a follow up note for this  65yMale with  Anemia        ROS:  CONSTITUTIONAL:  No fever, good appetite  CARDIOVASCULAR:  No chest pain or palpitations  RESPIRATORY:  No dyspnea  GASTROINTESTINAL:  No nausea, vomiting, diarrhea, or abdominal pain  GENITOURINARY:  No dysuria  NEUROLOGIC:  No headache,     Allergies    No Known Allergies    Intolerances        ANTIBIOTICS/RELEVANT:  antimicrobials    immunologic:  influenza   Vaccine 0.5 milliLiter(s) IntraMuscular once    OTHER:  acetaminophen   Tablet .. 650 milliGRAM(s) Oral every 6 hours PRN  aMIOdarone    Tablet 200 milliGRAM(s) Oral daily  cyanocobalamin 1000 MICROGram(s) Oral daily  finasteride 5 milliGRAM(s) Oral daily  folic acid 1 milliGRAM(s) Oral daily  gabapentin 100 milliGRAM(s) Oral at bedtime  lidocaine   Patch 1 Patch Transdermal daily  metoprolol succinate ER 25 milliGRAM(s) Oral daily  mirtazapine 7.5 milliGRAM(s) Oral at bedtime  pantoprazole    Tablet 40 milliGRAM(s) Oral every 12 hours  polyethylene glycol 3350 17 Gram(s) Oral two times a day  senna 2 Tablet(s) Oral at bedtime  simethicone 80 milliGRAM(s) Chew three times a day  sodium chloride 0.9% lock flush 3 milliLiter(s) IV Push every 8 hours  spironolactone 25 milliGRAM(s) Oral daily      Objective:  Vital Signs Last 24 Hrs  T(C): 36.3 (19 Jan 2021 21:50), Max: 36.8 (19 Jan 2021 01:55)  T(F): 97.4 (19 Jan 2021 21:50), Max: 98.2 (19 Jan 2021 01:55)  HR: 81 (19 Jan 2021 21:50) (69 - 82)  BP: 106/71 (19 Jan 2021 19:17) (102/74 - 107/72)  BP(mean): 94 (19 Jan 2021 21:50) (78 - 94)  RR: 18 (19 Jan 2021 21:50) (16 - 18)  SpO2: 100% (19 Jan 2021 21:50) (100% - 100%)    PHYSICAL EXAM:  Constitutional:no acute distress  Eyes:ALONSO, EOMI  Ear/Nose/Throat: no oral lesions, 	  Respiratory: clear BL  Cardiovascular: S1S2  Gastrointestinal:soft, (+) BS, no tenderness soft but c/o pain near driveline traversing site under skin  Extremities:no e/e/c  No Lymphadenopathy  IV sites not inflammed.    LABS:                        7.9    8.92  )-----------( 253      ( 19 Jan 2021 09:33 )             27.0     01-19    133<L>  |  99  |  17  ----------------------------<  112<H>  4.2   |  24  |  1.20    Ca    8.6      19 Jan 2021 05:48    TPro  7.9  /  Alb  3.9  /  TBili  x   /  DBili  x   /  AST  x   /  ALT  x   /  AlkPhos  x   01-18    PT/INR - ( 19 Jan 2021 05:48 )   PT: 24.0 sec;   INR: 2.07 ratio         PTT - ( 19 Jan 2021 05:48 )  PTT:68.5 sec      MICROBIOLOGY:            RECENT CULTURES:  01-16 @ 23:00  .Blood Blood-Venous  --  --  --    No growth to date.  --      RADIOLOGY & ADDITIONAL STUDIES:    < from: Xray Abdomen 1 View PORTABLE -Urgent (Xray Abdomen 1 View PORTABLE -Urgent .) (01.19.21 @ 08:53) >  IMPRESSION: Nonobstructive bowel gas pattern. Large stool burden.    < end of copied text >

## 2021-01-19 NOTE — PROGRESS NOTE ADULT - PROBLEM SELECTOR PLAN 3
ID following  Afebrile since 1/12   CT 1/11 no source infection / bleeding  BCx from 1/11 now (+) for cultibacterium acnes in anaerobic bottle   Repeat Bcx NGTD 1/16  GI c/s placed as per Dr. Son - marcus appreciated; low suspicion for GI bleed

## 2021-01-19 NOTE — PROGRESS NOTE ADULT - PROBLEM SELECTOR PLAN 1
Management as per heart failure team   Will dose 3 mg Coumadin this evening as per LVAD team   Heparin gtt D'cd    Daily LDH   Continue current medication regimen as per CHF team

## 2021-01-19 NOTE — PROGRESS NOTE ADULT - PROBLEM SELECTOR PLAN 2
Heme following - results of peripheral smear from 1/14 c/w known LVAD, further recommendations pending   Daily CBC  Monitor VS  1/19 1PRBC

## 2021-01-19 NOTE — DISCHARGE NOTE PROVIDER - NSDCMRMEDTOKEN_GEN_ALL_CORE_FT
acetaminophen 325 mg oral tablet: 1-2 tab(s) orally every 4 hours, As Needed  amiodarone 200 mg oral tablet: 1 tab(s) orally once a day   aspirin 81 mg oral tablet: 1 tab(s) orally once a day  Colace 100 mg oral capsule: 1 cap(s) orally 2 times a day, As Needed  cyanocobalamin 1000 mcg oral tablet: 1 tab(s) orally once a day  doxycycline hyclate 100 mg oral tablet: 1 tab(s) orally 2 times a day  ferrous sulfate 325 mg (65 mg elemental iron) oral tablet: 1 tab(s) orally 3 times a day  finasteride 5 mg oral tablet: 1 tab(s) orally once a day  folic acid 1 mg oral tablet: 1 tab(s) orally once a day  gabapentin 100 mg oral capsule: 1 cap(s) orally 2 times a day  metoprolol succinate 25 mg oral tablet, extended release: 1 tab(s) orally once a day  mirtazapine 7.5 mg oral tablet: 1 tab(s) orally once a day (at bedtime)  pantoprazole 40 mg oral delayed release tablet: 1 tab(s) orally once a day (before a meal)  Indication: Gi ppx   polyethylene glycol 3350 oral powder for reconstitution: 17 gram(s) orally 2 times a day  simethicone 80 mg oral tablet, chewable: 1 tab(s) orally 3 times a day  spironolactone 25 mg oral tablet: 1 tab(s) orally once a day  Indication: heart rate  warfarin 3 mg oral tablet: 1 tab(s) orally once a day     INR goal 2-2.5   acetaminophen 325 mg oral tablet: 2 tab(s) orally every 6 hours, As Needed   amiodarone 200 mg oral tablet: 1 tab(s) orally once a day   Colace 100 mg oral capsule: 1 cap(s) orally 2 times a day   cyanocobalamin 1000 mcg oral tablet: 1 tab(s) orally once a day  finasteride 5 mg oral tablet: 1 tab(s) orally once a day  folic acid 1 mg oral tablet: 1 tab(s) orally once a day  gabapentin 100 mg oral capsule: 1 cap(s) orally 2 times a day  Lidoderm 5% topical film: Apply topically to affected area on left side once a day   metoprolol succinate 25 mg oral tablet, extended release: 1 tab(s) orally once a day  MiraLax oral powder for reconstitution: 17 gram(s) orally 2 times a day   mirtazapine 7.5 mg oral tablet: 1 tab(s) orally once a day (at bedtime)  pantoprazole 40 mg oral delayed release tablet: 1 tab(s) orally once a day (before a meal)  Indication: Gi ppx   senna oral tablet: 2 tab(s) orally once a day (at bedtime)  simethicone 80 mg oral tablet, chewable: 1 tab(s) orally 3 times a day  spironolactone 25 mg oral tablet: 1 tab(s) orally once a day  Indication: heart rate  warfarin 3 mg oral tablet: 1 tab(s) orally once a day (at bedtime)  INR goal 2-2.5

## 2021-01-19 NOTE — PROGRESS NOTE ADULT - SUBJECTIVE AND OBJECTIVE BOX
VITAL SIGNS    Telemetry: SR [id number]  Vital Signs Last 24 Hrs  T(C): 36.3 (01-19-21 @ 04:41), Max: 36.8 (01-19-21 @ 01:55)  T(F): 97.3 (01-19-21 @ 04:41), Max: 98.2 (01-19-21 @ 01:55)  HR: 77 (01-19-21 @ 09:05) (69 - 83)  BP: --  RR: 17 (01-19-21 @ 09:05) (16 - 18)  SpO2: 100% (01-19-21 @ 09:05) (100% - 100%)            01-18 @ 07:01  -  01-19 @ 07:00  --------------------------------------------------------  IN: 1100 mL / OUT: 950 mL / NET: 150 mL    01-19 @ 07:01  -  01-19 @ 12:21  --------------------------------------------------------  IN: 0 mL / OUT: 105 mL / NET: -105 mL       Daily     Daily   Admit Wt: Drug Dosing Weight  Height (cm): 177.8 (11 Jan 2021 09:28)  Weight (kg): 74 (12 Jan 2021 10:48)  BMI (kg/m2): 23.4 (12 Jan 2021 10:48)  BSA (m2): 1.91 (12 Jan 2021 10:48)      CAPILLARY BLOOD GLUCOSE      MEDICATIONS  acetaminophen   Tablet .. 650 milliGRAM(s) Oral every 6 hours PRN  aMIOdarone    Tablet 200 milliGRAM(s) Oral daily  cefTRIAXone   IVPB 2000 milliGRAM(s) IV Intermittent every 12 hours  cyanocobalamin 1000 MICROGram(s) Oral daily  finasteride 5 milliGRAM(s) Oral daily  folic acid 1 milliGRAM(s) Oral daily  gabapentin 100 milliGRAM(s) Oral at bedtime  influenza   Vaccine 0.5 milliLiter(s) IntraMuscular once  lidocaine   Patch 1 Patch Transdermal daily  metoprolol succinate ER 25 milliGRAM(s) Oral daily  mirtazapine 7.5 milliGRAM(s) Oral at bedtime  pantoprazole    Tablet 40 milliGRAM(s) Oral every 12 hours  polyethylene glycol 3350 17 Gram(s) Oral two times a day  senna 2 Tablet(s) Oral at bedtime  simethicone 80 milliGRAM(s) Chew three times a day  sodium chloride 0.9% lock flush 3 milliLiter(s) IV Push every 8 hours  spironolactone 25 milliGRAM(s) Oral daily  warfarin 3 milliGRAM(s) Oral once      >>> <<<  PHYSICAL EXAM  Subjective: Hi, Im okay   Neurology: alert and oriented x 3, nonfocal, no gross deficits  CV : +VAD HUM   Lungs: CTA B/L, No wheezing, rales, rhonchi. Non labored respirations   Abdomen: soft, NT, ND, (+)BM  :  Voiding   Extremities: No LE edema bilaterally, +DP, No calf tenderness     LABS  01-19    133<L>  |  99  |  17  ----------------------------<  112<H>  4.2   |  24  |  1.20    Ca    8.6      19 Jan 2021 05:48    TPro  7.9  /  Alb  3.9  /  TBili  x   /  DBili  x   /  AST  x   /  ALT  x   /  AlkPhos  x   01-18                                 7.9    8.92  )-----------( 253      ( 19 Jan 2021 09:33 )             27.0          PT/INR - ( 19 Jan 2021 05:48 )   PT: 24.0 sec;   INR: 2.07 ratio         PTT - ( 19 Jan 2021 05:48 )  PTT:68.5 sec       PAST MEDICAL & SURGICAL HISTORY:  GI bleed    Vertigo    H/O epistaxis    Iron deficiency anemia    CKD (chronic kidney disease), stage III    Clavicle fracture    Falls    Anticoagulation goal of INR 2.0 to 2.5    ACC/AHA stage D systolic heart failure    BPH with urinary obstruction    Claudication    Peripheral arterial disease    Bleeding hemorrhoids    Hemorrhoids    History of 2019 novel coronavirus disease (COVID-19)  april 2020    Pleural effusion    Depression    CAD (coronary artery disease)    CHF (congestive heart failure)    S/P endoscopy    S/P ventricular assist device    S/P TVR (tricuspid valve repair)

## 2021-01-19 NOTE — PROGRESS NOTE ADULT - SUBJECTIVE AND OBJECTIVE BOX
Subjective: Patient seen and examined resting in bed. ON no issues. States he is feeling better     Medications:  acetaminophen   Tablet .. 650 milliGRAM(s) Oral every 6 hours PRN  aMIOdarone    Tablet 200 milliGRAM(s) Oral daily  cefTRIAXone   IVPB 2000 milliGRAM(s) IV Intermittent every 12 hours  cyanocobalamin 1000 MICROGram(s) Oral daily  finasteride 5 milliGRAM(s) Oral daily  folic acid 1 milliGRAM(s) Oral daily  gabapentin 100 milliGRAM(s) Oral at bedtime  influenza   Vaccine 0.5 milliLiter(s) IntraMuscular once  lidocaine   Patch 1 Patch Transdermal daily  metoprolol succinate ER 25 milliGRAM(s) Oral daily  mirtazapine 7.5 milliGRAM(s) Oral at bedtime  pantoprazole    Tablet 40 milliGRAM(s) Oral every 12 hours  polyethylene glycol 3350 17 Gram(s) Oral two times a day  senna 2 Tablet(s) Oral at bedtime  simethicone 80 milliGRAM(s) Chew three times a day  sodium chloride 0.9% lock flush 3 milliLiter(s) IV Push every 8 hours  spironolactone 25 milliGRAM(s) Oral daily  warfarin 3 milliGRAM(s) Oral once      Vitals:  Vital Signs Last 24 Hours  T(C): 36.3 (01-19-21 @ 04:41), Max: 36.8 (01-19-21 @ 01:55)  HR: 77 (01-19-21 @ 05:27) (69 - 83)  BP: --  RR: 16 (01-19-21 @ 05:27) (16 - 18)  SpO2: 100% (01-19-21 @ 05:27) (100% - 100%)      I&O's Summary    18 Jan 2021 07:01  -  19 Jan 2021 07:00  --------------------------------------------------------  IN: 1100 mL / OUT: 950 mL / NET: 150 mL      Physical Exam  General: No distress. Comfortable.  HEENT: EOM intact.  Neck: Neck supple. JVP not elevated. No masses  Chest: Clear to auscultation bilaterally  CV: +VAD hum  Abdomen: Soft, non-distended, non-tender, +BS  Skin: No rashes or skin breakdown  Neurology: Alert and oriented times three. Sensation intact  Psych: Affect normal    LVAD Interrogation  VAD TYPE HM 2  Speed 8800  Flow 4.5  Power 4.7  PI 7  Assessment of driveline exit site: driveline dressing c/d/i  Programming changes: no changes made    Labs:                        7.4    9.40  )-----------( 240      ( 19 Jan 2021 05:48 )             24.9     01-19    133<L>  |  99  |  17  ----------------------------<  112<H>  4.2   |  24  |  1.20    Ca    8.6      19 Jan 2021 05:48    TPro  7.9  /  Alb  x   /  TBili  x   /  DBili  x   /  AST  x   /  ALT  x   /  AlkPhos  x   01-18    PT/INR - ( 19 Jan 2021 05:48 )   PT: 24.0 sec;   INR: 2.07 ratio         PTT - ( 19 Jan 2021 05:48 )  PTT:68.5 sec    Lactate Dehydrogenase, Serum: 368 U/L (01-17 @ 05:35)

## 2021-01-19 NOTE — PROGRESS NOTE ADULT - ASSESSMENT
66 y/o M w/ h/o stage D NICM/HFrEF s/p HM2 (as DT 9/17 d/t PAD), TV ring, PAD, prior COVID (4/20) who was readmitted with abdominal pain x 4-5 days in setting of constipation as well as recent initiation of doxycycline for driveline drainage. Labs showed patient to be anemic with H/H of 6.5/22 in setting of INR 2.89. He was transfused with 1uPRBC on 1/11 and responded appropriately. Continues to have febrile episodes (last on 1/12 with Tmax of 101.1). Blood cultures remains NGTD and UA is negative. Was given aggressive bowel regimen today in hopes for resolution of constipation. Hopefully that abdominal pain will resolve once patient has bowel movement.       Assessment- 64 yo man with HM-2 admitted with fevers and mid epigastric abdominal pain  CT of abdomen relatively so unclear if he passed a gall stone or kidney stone or other etiology    Would:   blood cultures no growth   check quantiferon  check urine histo ag.  repeat UA and culture- in case he passed a renal calculus  COVID IGG ab + suggests immunity from prior infeciton in the Spring  strongyloides ab    unclear etiology of his abdominal pain- but also with constipation ? diverticular disease  would discharge home without antibiotics      Morris Prater MD  [phone]  After 5pm/weekends [phone]

## 2021-01-19 NOTE — PROGRESS NOTE ADULT - PROBLEM SELECTOR PLAN 1
- readmitted with H/H 6.5/22 in the setting of INR 2.89. Was transfused with 1uPRBC on 1/11and did responded appropriately however H/H continues to slowly downtrend. Continue to monitor and transfuse as needed   - Fecal Occult negative  - D/c Iron supplements given constipation  - Reticulocyte 1.1 (inapprop response), Haptoglobin wnl  - Hematology consulted and appreciate recommendations  - Pancytopenia is most likely multifactorial, due to slight hemolysis from LVAD and ongoing infection   - HIV, CMV, EBV, SPEP, UPEP all negative - readmitted with H/H 6.5/22 in the setting of INR 2.89. Was transfused with 1uPRBC on 1/11and did responded appropriately however H/H continues to slowly downtrend. Will be given additional unit PRBC today.   - Fecal Occult negative  - D/c Iron supplements given constipation  - Reticulocyte 1.1 (inapprop response), Haptoglobin wnl  - Hematology consulted and appreciate recommendations  - Pancytopenia is most likely multifactorial, due to slight hemolysis from LVAD and ongoing infection   - HIV, CMV, EBV, SPEP, UPEP all negative

## 2021-01-19 NOTE — PROGRESS NOTE ADULT - PROBLEM SELECTOR PLAN 4
- Remains afebrile. Last febrile episode was 1/12 with Tmax of 101.1  - ID consulted, appreciate recommendations  - Blood cultures 1/11 with Propinobacterium acnes; will f/u with Dr. Prater re: treatment plan  - UA 1/12 negative. Will recheck UA and UCx in case he passed a renal calculus  - Urine histo ag pending  - Quantiferon negative   - COVID IGG ab positive, hx of COVID in April 2020  - Strongyloides ab pending   - CT C/A/P 1/11 shows no evidence of acute pathology in chest of abdomen - Remains afebrile. Last febrile episode was 1/12 with Tmax of 101.1  - ID consulted, appreciate recommendations  - Blood cultures 1/11 with Propinobacterium acnes; currently on Ceftriaxone 2gIV BID. Per ID will be transition to oral antibiotics on the day of discharge. Will be required to complete 14 day course of antibiotics.   - UA 1/12 negative. Will recheck UA and UCx in case he passed a renal calculus  - Quantiferon negative   - COVID IGG ab positive, hx of COVID in April 2020  - Strongyloides ab pending   - CT C/A/P 1/11 shows no evidence of acute pathology in chest of abdomen

## 2021-01-19 NOTE — PROGRESS NOTE ADULT - PROBLEM SELECTOR PLAN 3
- Continue with current speed (8800 rpm)  - Continue Coumadin for anticoagulation (INR goal 2-2.5). Holding ASA at this time for low H/H on admission, will resume when feasible.   - Continue to trend LDH levels daily.

## 2021-01-19 NOTE — DISCHARGE NOTE PROVIDER - NSDCFUADDAPPT_GEN_ALL_CORE_FT
- Patient will be followed closely by LVAD Coordinator  - Will be seen in clinic on Friday January 29th at 9am   - Patient INR and Warfarin dosing will be managed by LVAD Coordinator  - Next INR draw will be on January 25th

## 2021-01-19 NOTE — DISCHARGE NOTE PROVIDER - CARE PROVIDER_API CALL
Tamanna Mcclendon (NP; RN)  NP in Adult Health  [address] [place] [id number]  Phone: [phone]  Fax: [phone]  Follow Up Time:

## 2021-01-20 ENCOUNTER — TRANSCRIPTION ENCOUNTER (OUTPATIENT)
Age: 66
End: 2021-01-20

## 2021-01-20 VITALS — HEART RATE: 88 BPM | RESPIRATION RATE: 18 BRPM | OXYGEN SATURATION: 100 %

## 2021-01-20 LAB
% GAMMA, URINE: 13.3 % — SIGNIFICANT CHANGE UP
ALBUMIN 24H MFR UR ELPH: 8.4 % — SIGNIFICANT CHANGE UP
ALBUMIN SERPL ELPH-MCNC: 4 G/DL — SIGNIFICANT CHANGE UP (ref 3.3–5)
ALP SERPL-CCNC: 74 U/L — SIGNIFICANT CHANGE UP (ref 40–120)
ALPHA1 GLOB 24H MFR UR ELPH: 37.4 % — SIGNIFICANT CHANGE UP
ALPHA2 GLOB 24H MFR UR ELPH: 20.2 % — SIGNIFICANT CHANGE UP
ALT FLD-CCNC: 46 U/L — HIGH (ref 10–45)
ANION GAP SERPL CALC-SCNC: 12 MMOL/L — SIGNIFICANT CHANGE UP (ref 5–17)
APTT BLD: 32.3 SEC — SIGNIFICANT CHANGE UP (ref 27.5–35.5)
AST SERPL-CCNC: 58 U/L — HIGH (ref 10–40)
B-GLOBULIN 24H MFR UR ELPH: 20.7 % — SIGNIFICANT CHANGE UP
BILIRUB SERPL-MCNC: 0.2 MG/DL — SIGNIFICANT CHANGE UP (ref 0.2–1.2)
BUN SERPL-MCNC: 19 MG/DL — SIGNIFICANT CHANGE UP (ref 7–23)
CALCIUM SERPL-MCNC: 9.3 MG/DL — SIGNIFICANT CHANGE UP (ref 8.4–10.5)
CHLORIDE SERPL-SCNC: 98 MMOL/L — SIGNIFICANT CHANGE UP (ref 96–108)
CO2 SERPL-SCNC: 22 MMOL/L — SIGNIFICANT CHANGE UP (ref 22–31)
COLLECT DURATION TIME UR: 24 HR — SIGNIFICANT CHANGE UP
CREAT SERPL-MCNC: 1.24 MG/DL — SIGNIFICANT CHANGE UP (ref 0.5–1.3)
DEPRECATED KAPPA LC FREE/LAMBDA SER: 16.12 RATIO — HIGH (ref 0.7–6.02)
GLUCOSE SERPL-MCNC: 105 MG/DL — HIGH (ref 70–99)
HCT VFR BLD CALC: 31.6 % — LOW (ref 39–50)
HGB BLD-MCNC: 9.7 G/DL — LOW (ref 13–17)
INR BLD: 2.02 RATIO — HIGH (ref 0.88–1.16)
INTERPRETATION 24H UR IFE-IMP: SIGNIFICANT CHANGE UP
INTERPRETATION 24H UR IFE-IMP: SIGNIFICANT CHANGE UP
INTERPRETATION SERPL IFE-IMP: SIGNIFICANT CHANGE UP
KAPPA LC SER QL IFE: 3.46 MG/DL — HIGH (ref 0.33–1.94)
KAPPA LC UR-MCNC: 280.62 MG/L — HIGH
KAPPA/LAMBDA FREE LIGHT CHAIN RATIO, SERUM: 1.56 RATIO — SIGNIFICANT CHANGE UP (ref 0.26–1.65)
LAMBDA LC SER QL IFE: 2.22 MG/DL — SIGNIFICANT CHANGE UP (ref 0.57–2.63)
LAMBDA LC UR-MCNC: 17.41 MG/L — HIGH
LDH SERPL L TO P-CCNC: 427 U/L — HIGH (ref 50–242)
M PROTEIN 24H UR ELPH-MRATE: 0 MG/24HR — SIGNIFICANT CHANGE UP (ref 0–0)
M PROTEIN 24H UR ELPH-MRATE: 0 MG/DL — SIGNIFICANT CHANGE UP
MCHC RBC-ENTMCNC: 25.1 PG — LOW (ref 27–34)
MCHC RBC-ENTMCNC: 30.7 GM/DL — LOW (ref 32–36)
MCV RBC AUTO: 81.9 FL — SIGNIFICANT CHANGE UP (ref 80–100)
NRBC # BLD: 5 /100 WBCS — HIGH (ref 0–0)
PLATELET # BLD AUTO: 210 K/UL — SIGNIFICANT CHANGE UP (ref 150–400)
POTASSIUM SERPL-MCNC: 4.4 MMOL/L — SIGNIFICANT CHANGE UP (ref 3.5–5.3)
POTASSIUM SERPL-SCNC: 4.4 MMOL/L — SIGNIFICANT CHANGE UP (ref 3.5–5.3)
PROT ?TM UR-MCNC: 15 MG/DL — HIGH (ref 0–12)
PROT ?TM UR-MCNC: 30 MG/DL — HIGH (ref 0–12)
PROT PATTERN 24H UR ELPH-IMP: SIGNIFICANT CHANGE UP
PROT SERPL-MCNC: 7.7 G/DL — SIGNIFICANT CHANGE UP (ref 6–8.3)
PROTEIN QUANT CALC, URINE: 199 MG/24 H — HIGH (ref 50–100)
PROTHROM AB SERPL-ACNC: 23.4 SEC — HIGH (ref 10.6–13.6)
RBC # BLD: 3.86 M/UL — LOW (ref 4.2–5.8)
RBC # FLD: 18.7 % — HIGH (ref 10.3–14.5)
SODIUM SERPL-SCNC: 132 MMOL/L — LOW (ref 135–145)
TOTAL VOLUME - 24 HOUR: 1325 ML — SIGNIFICANT CHANGE UP
URINE CREATININE CALCULATION: 2.1 G/24 H — HIGH (ref 1–2)
WBC # BLD: 7.92 K/UL — SIGNIFICANT CHANGE UP (ref 3.8–10.5)
WBC # FLD AUTO: 7.92 K/UL — SIGNIFICANT CHANGE UP (ref 3.8–10.5)

## 2021-01-20 PROCEDURE — 93750 INTERROGATION VAD IN PERSON: CPT

## 2021-01-20 PROCEDURE — 99238 HOSP IP/OBS DSCHRG MGMT 30/<: CPT

## 2021-01-20 RX ORDER — WARFARIN SODIUM 2.5 MG/1
1 TABLET ORAL
Qty: 30 | Refills: 0
Start: 2021-01-20 | End: 2021-02-18

## 2021-01-20 RX ORDER — POLYETHYLENE GLYCOL 3350 17 G/17G
17 POWDER, FOR SOLUTION ORAL
Qty: 1020 | Refills: 0
Start: 2021-01-20 | End: 2021-02-18

## 2021-01-20 RX ORDER — ASPIRIN/CALCIUM CARB/MAGNESIUM 324 MG
1 TABLET ORAL
Qty: 0 | Refills: 0 | DISCHARGE

## 2021-01-20 RX ORDER — DOCUSATE SODIUM 100 MG
1 CAPSULE ORAL
Qty: 60 | Refills: 1
Start: 2021-01-20 | End: 2021-03-20

## 2021-01-20 RX ORDER — LIDOCAINE 4 G/100G
1 CREAM TOPICAL
Qty: 30 | Refills: 0
Start: 2021-01-20 | End: 2021-02-18

## 2021-01-20 RX ORDER — SIMETHICONE 80 MG/1
1 TABLET, CHEWABLE ORAL
Qty: 90 | Refills: 0
Start: 2021-01-20 | End: 2021-02-18

## 2021-01-20 RX ORDER — SENNA PLUS 8.6 MG/1
2 TABLET ORAL
Qty: 60 | Refills: 1
Start: 2021-01-20 | End: 2021-03-20

## 2021-01-20 RX ORDER — PANTOPRAZOLE SODIUM 20 MG/1
1 TABLET, DELAYED RELEASE ORAL
Qty: 90 | Refills: 3
Start: 2021-01-20 | End: 2022-01-01

## 2021-01-20 RX ORDER — DOCUSATE SODIUM 100 MG
1 CAPSULE ORAL
Qty: 0 | Refills: 0 | DISCHARGE

## 2021-01-20 RX ORDER — ACETAMINOPHEN 500 MG
2 TABLET ORAL
Qty: 40 | Refills: 0
Start: 2021-01-20 | End: 2021-01-24

## 2021-01-20 RX ADMIN — SODIUM CHLORIDE 3 MILLILITER(S): 9 INJECTION INTRAMUSCULAR; INTRAVENOUS; SUBCUTANEOUS at 05:48

## 2021-01-20 RX ADMIN — SIMETHICONE 80 MILLIGRAM(S): 80 TABLET, CHEWABLE ORAL at 05:45

## 2021-01-20 RX ADMIN — Medication 25 MILLIGRAM(S): at 05:46

## 2021-01-20 RX ADMIN — AMIODARONE HYDROCHLORIDE 200 MILLIGRAM(S): 400 TABLET ORAL at 05:46

## 2021-01-20 RX ADMIN — SPIRONOLACTONE 25 MILLIGRAM(S): 25 TABLET, FILM COATED ORAL at 05:45

## 2021-01-20 RX ADMIN — PANTOPRAZOLE SODIUM 40 MILLIGRAM(S): 20 TABLET, DELAYED RELEASE ORAL at 05:45

## 2021-01-20 NOTE — PROVIDER CONTACT NOTE (CRITICAL VALUE NOTIFICATION) - ACTION/TREATMENT ORDERED:
Continue to monitor labs.
Continue to monitor.
Hold heparin gtt for 60 minutes, then resume at 20 ml/hr

## 2021-01-20 NOTE — PROVIDER CONTACT NOTE (OTHER) - BACKGROUND
Pt admit dx anemia, LVAD
Pt came in for anemia.
patient admitted for anemia
Pt came in for anemia.
Pt admitting diagnosis Anemia, HM2 placed 2017
pt admitted for anemia.
Pt admitting dx anemia, HM2 implanted 2017.
Pt came in for anemia
pt admitted for anemia.

## 2021-01-20 NOTE — PROVIDER CONTACT NOTE (OTHER) - ACTION/TREATMENT ORDERED:
As per NP, change heparin gtt to 7ml/hr. Repeat Ptt & CBC in 6 hours.
As per NP, keep heparin gtt at 7ml/hr. Repeat Ptt & CBC in 6 hours.
Decrease heparin gtt to 6ml/hr;
Rose Mary TAYLOR made aware of situation.
RN will continue to monitor.
Provider notified. Keep rate at 7ml/hr.
As per CLAUDIA Starks, keep heparin at 7ml/hr
As per CTSx, keep heparin at 7ml/hr
As per CTsx, keep heparin at 7ml/hr. Repeat aPTT and CBC in 6 hours
CLAUDIA Bazzi made aware of situation. Came to pt bedside to assess patient. Electrical tape administered to tears as per CLAUDIA Bazzi. Will continue to monitor patient and situation.
RN will continue to monitor.

## 2021-01-20 NOTE — PROGRESS NOTE ADULT - PROBLEM SELECTOR PROBLEM 2
ACC/AHA stage D systolic heart failure
Anemia
ACC/AHA stage D systolic heart failure
Anemia
ACC/AHA stage D systolic heart failure
Anemia
ACC/AHA stage D systolic heart failure
Anemia
ACC/AHA stage D systolic heart failure

## 2021-01-20 NOTE — PROVIDER CONTACT NOTE (CRITICAL VALUE NOTIFICATION) - TEST AND RESULT REPORTED:
H/H: 7.0/23.2
Blood culture preliminary growth in anaerobic bottle gram positive rods
PTT greater than 200

## 2021-01-20 NOTE — PROGRESS NOTE ADULT - PROBLEM SELECTOR PLAN 1
- readmitted with H/H 6.5/22 in the setting of INR 2.89. During his admission was transfused with 2uPRBC, last on 1/19.   - Fecal Occult negative  - D/c Iron supplements given constipation  - Reticulocyte 1.1 (inapprop response), Haptoglobin wnl  - Hematology consulted and appreciate recommendations  - Pancytopenia is most likely multifactorial, due to slight hemolysis from LVAD and ongoing infection   - HIV, CMV, EBV, SPEP, UPEP all negative

## 2021-01-20 NOTE — PROVIDER CONTACT NOTE (OTHER) - ASSESSMENT
A&Ox4, no signs of bleeding present at the moment.
Pt MAP 95, HR 88. Pt asymptomatic.
Pt has multiple tears noted in white driveline wire, pt asymptomatic, AOx4
Pt A&Ox4, Heartmate II. No complaints of pain or discomfort. No signs of bleeding present at the moment.
patient VSS; no signs of bleeding or hematoma
pt is AOx4. VS as per flowsheet. pt was adjusting table. pt is asymptomatic, denies headache, cp, dizziness.
pt is AOx4. VS as per flowsheet. pt is asymptomatic, denies headache, cp, dizziness.
Pt A&Ox4, Heartmate II. No complaints of pain or discomfort. No signs of bleeding present at the moment.

## 2021-01-20 NOTE — PROVIDER CONTACT NOTE (CRITICAL VALUE NOTIFICATION) - SITUATION
Blood culture preliminary growth in anaerobic bottle gram positive rods
H/H: 7.0/23.2
PTT greater than 200

## 2021-01-20 NOTE — PROGRESS NOTE ADULT - PROBLEM SELECTOR PROBLEM 4
Fever, unspecified fever cause

## 2021-01-20 NOTE — DISCHARGE NOTE NURSING/CASE MANAGEMENT/SOCIAL WORK - PATIENT PORTAL LINK FT
You can access the FollowMyHealth Patient Portal offered by [facility] by registering at the following website: [email or web address]. By joining [facility]’s FollowMyHealth portal, you will also be able to view your health information using other applications (apps) compatible with our system.

## 2021-01-20 NOTE — PROGRESS NOTE ADULT - PROBLEM SELECTOR PROBLEM 1
Anemia, unspecified type
LVAD (left ventricular assist device) present
Anemia, unspecified type
LVAD (left ventricular assist device) present
Anemia, unspecified type
Anemia, unspecified type
LVAD (left ventricular assist device) present
LVAD (left ventricular assist device) present
Anemia, unspecified type

## 2021-01-20 NOTE — PROVIDER CONTACT NOTE (OTHER) - DATE AND TIME:
19-Jan-2021 22:05
13-Jan-2021 21:45
18-Jan-2021 07:15
19-Jan-2021 11:00
16-Jan-2021 04:08
16-Jan-2021 19:43
17-Jan-2021 03:22
17-Jan-2021 06:31
20-Jan-2021 09:30
19-Jan-2021 06:16
15-Norman-2021 20:47

## 2021-01-20 NOTE — PROGRESS NOTE ADULT - PROVIDER SPECIALTY LIST ADULT
CT Surgery
Heart Failure
CT Surgery
Gastroenterology
Infectious Disease
CT Surgery
Infectious Disease
Infectious Disease
CT Surgery
Infectious Disease
CT Surgery
CT Surgery
Heart Failure
CT Surgery
CT Surgery
Heart Failure

## 2021-01-20 NOTE — PROGRESS NOTE ADULT - SUBJECTIVE AND OBJECTIVE BOX
Subjective: Patient seen and examined resting in bed. ON no issues. Will be discharged home today.    Medications:  acetaminophen   Tablet .. 650 milliGRAM(s) Oral every 6 hours PRN  aMIOdarone    Tablet 200 milliGRAM(s) Oral daily  cyanocobalamin 1000 MICROGram(s) Oral daily  finasteride 5 milliGRAM(s) Oral daily  folic acid 1 milliGRAM(s) Oral daily  gabapentin 100 milliGRAM(s) Oral at bedtime  influenza   Vaccine 0.5 milliLiter(s) IntraMuscular once  lidocaine   Patch 1 Patch Transdermal daily  metoprolol succinate ER 25 milliGRAM(s) Oral daily  mirtazapine 7.5 milliGRAM(s) Oral at bedtime  pantoprazole    Tablet 40 milliGRAM(s) Oral every 12 hours  polyethylene glycol 3350 17 Gram(s) Oral two times a day  senna 2 Tablet(s) Oral at bedtime  simethicone 80 milliGRAM(s) Chew three times a day  sodium chloride 0.9% lock flush 3 milliLiter(s) IV Push every 8 hours  spironolactone 25 milliGRAM(s) Oral daily      Vitals:  Vital Signs Last 24 Hours  T(C): 36.4 (21 @ 05:45), Max: 36.6 (21 @ 19:17)  HR: 88 (21 @ 09:31) (74 - 88)  BP: 106/71 (21 @ 19:17) (102/74 - 107/72)  RR: 18 (21 @ 09:31) (17 - 18)  SpO2: 100% (21 @ 09:31) (99% - 100%)    Weight in k.9 ( @ 08:28)    I&O's Summary    2021 07:01  -  2021 07:00  --------------------------------------------------------  IN: 450 mL / OUT: 295 mL / NET: 155 mL        Physical Exam  General: No distress. Comfortable.  HEENT: EOM intact.  Neck: Neck supple. JVP not elevated. No masses  Chest: Clear to auscultation bilaterally  CV: +VAD hum   Abdomen: Soft, non-distended, non-tender  Skin: No rashes or skin breakdown  Neurology: Alert and oriented times three. Sensation intact  Psych: Affect normal    LVAD Interrogation  VAD TYPE HM 2  Speed 8800  Flow 4.5  Power 4.8  PI 6.8  Assessment of driveline exit site: driveline dressing c/d/i  Programming changes: no changes made    Labs:                        9.7    7.92  )-----------( 210      ( 2021 05:07 )             31.6         132<L>  |  98  |  19  ----------------------------<  105<H>  4.4   |  22  |  1.24    Ca    9.3      2021 05:07    TPro  7.7  /  Alb  4.0  /  TBili  0.2  /  DBili  x   /  AST  58<H>  /  ALT  46<H>  /  AlkPhos  74      PT/INR - ( 2021 05:07 )   PT: 23.4 sec;   INR: 2.02 ratio         PTT - ( 2021 05:07 )  PTT:32.3 sec          Lactate Dehydrogenase, Serum: 427 U/L ( @ 05:07)

## 2021-01-20 NOTE — PROVIDER CONTACT NOTE (CRITICAL VALUE NOTIFICATION) - NS PROVIDER READ BACK TO LAB
Patient:   Kyrie Chinchilla            MRN: [id number]            FIN: [id number]              Age:   63 years     Sex:  MALE     :  62   Associated Diagnoses:   None   Author:   DIMPLE, SARA     Subjective   2L UF yesterday.  Pt with UOP but not measuring adequately     Health Status   Current medications: Medications (31) Active  Scheduled: (22)  Aspirin 81 mg DR tab  81 mg 1 tab, Oral, Daily  Bumetanide 1 mg/4 mL inj SDV  2 mg 8 mL, Slow IV Push, Q8H (variable)  ceFAZolin  2,000 mg 15 mL, Slow IV Push, Pre Op (One Time)  Cyanocobalamin 500 mcg tab  1,000 mcg 2 tab, Oral, Daily  Dapsone 100 mg tab  100 mg 1 tab, Oral, Daily  Ferrous sulfate 325 mg tab [Fe 65 mg]  325 mg 1 tab, Oral, TID  Folic acid 1 mg tab  1 mg 1 tab, Oral, Daily  Gabapentin 100 mg cap  100 mg 1 cap, Oral, Q8H  Heparin 5,000 unit/1 mL inj  5,000 unit 1 mL, Subcutaneous, Q8H  HydrALAZINE 50 mg tab  50 mg 1 tab, Oral, Q8H  Insulin human lispro 1 unit/0.01 mL inj  1-6 units, Subcutaneous, QID [before meals & HS]  Lidocaine 4% (40 mg/gm) patch  1 patch, TransDermal, Daily  lidocaine topical patch removal`  Remove Patch, TransDermal, Q Bedtime  Methotrexate 2.5 mg tab  5 mg 2 tab, Oral, Q Friday  Multivitamin with minerals chew tab  1 tab, Chewed, BID  Pantoprazole 40 mg DR tab  40 mg 1 tab, Oral, BID  Pravastatin 10 mg tab  10 mg 1 tab, Oral, Q Bedtime  Sucralfate 1,000 mg tab  1,000 mg 1 tab, Oral, QID [before meals & HS]  TACROlimus 1 mg cap  3 mg 3 cap, Oral, Q12H  Tamsulosin 0.4 mg cap  0.4 mg 1 cap, Oral, Daily  Terbutaline 2.5 mg tab  5 mg 2 tab, Oral, Q8H  Thiamine 100 mg tab  100 mg 1 tab, Oral, Daily  Continuous: (0)  PRN: (9)  Benzocaine-menthol 15-3.6 mg lozenge  1 lozenge, Buccal, As Directed PRN  Cyclobenzaprine 5 mg tab  5 mg 1 tab, Oral, TID  FentaNYL 100 mcg/2 mL inj SDV  25 mcg 0.5 mL, IV Push, Q1H  MetoCLOPramide 10 mg tab  10 mg 1 tab, Oral, Q12H  Ondansetron 4 mg/2 mL inj SDV  4 mg 2 mL, Slow IV Push, Q6H  Sodium
chloride 0.65% nasal spray 45 mL BULK  1 spray, Each Nostril, TID  Sodium chloride 0.65% w aloe nasal gel 14 gm  1 application, IntraNasal, Q6H  Sodium chloride PF 0.9% flush inj 10 mL  10 mL, Flush, As Directed PRN  TraMADol 50 mg tab  50 mg 1 tab, Oral, Q6H        Objective   Intake and Output   I & O between:  23-DEC-2019 11:57 TO 24-DEC-2019 11:57  Med Dosing Weight:  116  kg   20-DEC-2019  24 Hour Intake:   1256.00  ( 10.83 mL/kg )  24 Hour Output:   2640.00           24 Hour Urine/Stool Output:   0.0  24 Hour Balance:   -1384.00           24 Hour Urine Output:   500.00  ( 0.18 mL/kg/hr )                   Urine Count:  1    Stool Count:  3.00         VS/Measurements   Vitals between:   23-DEC-2019 11:57:01   TO   24-DEC-2019 11:57:01                   LAST RESULT MINIMUM MAXIMUM  Temperature 37.3 37.0 37.5  Heart Rate 101 90 111  Respiratory Rate 15 13 22  NISBP           140 107 157  NIDBP           75 63 108  NIMBP           98 77 116  CVP                     13 7 15  SpO2                    97 93 99   ,    Dosing Weight:   116 kg    12/20/2019 22:12  Most Recent Clinical Weight:   112.8  kg    12/24/2019 00:00    Previous Clinical Weight:   112.8  kg 12/16/2019 06:57  Changed:   No Change         General:  Alert and oriented, No acute distress. Eye:  Extraocular movements are intact, Normal conjunctiva. HENT:  Normocephalic, Normal hearing, Oral mucosa is moist.    Respiratory:  Lungs are clear to auscultation, Respirations are non-labored. Cardiovascular:  Normal rate, Regular rhythm, no rub. Gastrointestinal:  Soft, Non-tender, Non-distended. Lymphatics:  + LE edema, No lymphadenopathy. Integumentary:  Warm, Dry, No rash. Neurologic:  Alert, Oriented. Psychiatric:  Cooperative, Appropriate mood & affect.       Results Review   Labs between:  23-DEC-2019 11:57 to 24-DEC-2019 11:57  CBC:                 WBC  HgB  Hct  Plt  MCV  RDW   24-DEC-2019 9.1  (L) 8.0  (L) 25.9  240  92.5  14.9
23-DEC-2019   (L) 8.3  (L) 26.4         DIFF:                 Seg  Neutroph//ABS  Lymph//ABS  Mono//ABS  EOS/ABS  [id number] NOT APPLICABLE  81 // 7.4 8 // (L) 0.7  3 // 0.3 4 // 0.4  BMP:                 Na  Cl  BUN  Glu   24-DEC-2019 141  106  (H) 42  (H) 105                              K  CO2  Cr  Ca                              3.5  28  (H) 2.78  (L) 8.1   BMP:                 Na  Cl  BUN  Glu   23-DEC-2019                                     K  CO2  Cr  Ca                              3.4         CMP:                 AST  ALT  AlkPhos  Bili  Albumin   24-DEC-2019 (H) 42  52  70  (H) 1.4  (L) 2.1   Other Chem:             Mg  Phos  Triglycerides  GGTP  DirectBili                           1.9           POC GLU:                 Latest Result  Latest Date  Minimum  Min Date  Maximum  Max Date                             (H) 114  24-DEC-2019 (H) 114  24-DEC-2019 (H) 137  23-DEC-2019                 Result title:  CD ECHO 2D COMPLETE W DOP AND COLOR-ADLT  Result status:  Final  Verified by:  Kasi GALLEGOS [place] on 12/24/2019 8:34  SUMMARY:1. Left ventricle: The cavity size is at the upper limits of normal. Wall   thickness is at the upper limits of normal. Systolic function is at the   lower limits of normal. The estimated ejection fraction is 50-55%. 2. Left atrium: The atrium is dilated and consistent with transplant. 3. Pericardium, extracardiac: There is no pericardial effusion.--------------------------------------------------------------------------STUDY DATA:    Procedure:  Transthoracic echocardiography was performed. Image quality was good. M-mode, complete 2D, complete spectral Doppler,and color Doppler. Study status:  Routine. Study completion:  There wereno complications. FINDINGSLEFT VENTRICLE:  The cavity size is at the upper limits of normal. Wallthickness is at the upper limits of normal. Systolic function is at thelower limits of normal. The estimated ejection fraction is 50-55%.  Thetissue  Doppler
parameters are abnormal.AORTIC VALVE:  The annulus is normal-sized. The valve is trileaflet. Theleaflets are normal thickness. Doppler:   No regurgitation. AORTA:  Aortic root: The aortic root is normal in size. Ascending aorta: The ascending aorta is normal in size. MITRAL VALVE:  The annulus is normal-sized. The leaflets are normalthickness. Doppler:   Trivial regurgitation. The valve area bypressure half-time is 3.4cm. The valve  area index by pressure half-timeis 1.48cm/m. The peak diastolic gradient is 6mm Hg. LEFT ATRIUM:  The atrium is dilated and consistent with transplant. RIGHT VENTRICLE:  The cavity size is normal. Wall thickness is normal.Systolic function is mildly reduced. PULMONIC VALVE:   The annulus is normal-sized. The leaflets are normalthickness. Doppler:   No significant regurgitation. TRICUSPID VALVE:  The annulus is normal-sized. The leaflets are  normalthickness. Doppler:   Trivial regurgitation. RIGHT ATRIUM:  The atrium is dilated and consistent with transplant. PERICARDIUM:  There is no pericardial effusion.--------------------------------------------------------------------------Measurements Left ventricle         Value        12/18/2019 Ref       Left ventricle           Value          12/18/2019 Ref CEM, LAX chord         5.7   cm     6.0        4.2 -     continued                                                 5.8       E', avg, TDI             8.81  cm/sec   15.25      ---- ESD, LAX chord         3.6   cm     4.7        2.5 -     E/e', avg,               14             7          <=14                                                4.0       TDI CEM/bsa, LAX chord     2.5   cm/m 2.6        2.2 -                                                3.0       Ventricular septum       Value          12/18/2019 Ref ESD/bsa, LAX  chord     1.5   cm/m 2.0        1.3 -     IVS, ED, LAX  (H)        1.2   cm       0.9        0.6                                                2.1
- Mid-wall FS, LAX       17    %      12         14 - 22                                                      1.0 chord                                                    IVS, ED       (H)        1.2   cm       0.9        0.6 PW, ED, LAX         (H) 1.2   cm     1.1        0.6 -                                                        -                                                1.0                                                          1.0 IVS/PW, ED, LAX        0.99         0.86       -------- CEM major ax, A4C      8.4   cm     9.1        --------  Right ventricle          Value          12/18/2019 Ref ESD major ax, A4C      7.5   cm     8.2        --------  CEM, LAX                  3.1   cm       3.2        ---- FS major axis, A4C     11    %      10         -------- CEM/bsa major ax,      3.6   cm/m 4.0        --------  Left atrium              Value          12/18/2019 Ref A4C                                                      AP dim, ES    (H)        5.6   cm       6.6        3.0 ESD/bsa major ax,      3.2   cm/m 3.5        --------                                                     - A4C                                                                                                          4.0 NEMO, A4C               42.7  cm   50.1       --------  AP dim index  (H)        2.4   cm/m   2.9        1.5 NANCY, A4C               25.1  cm   28.7       --------                                                     - FAC, A4C               41    %      43         --------                                                     2.3 PW, ED             (H) 1.2   cm      1.1        0.6 -     Area ES, A4C  (H)        40    cm     32         <=20                                                1.0       Vol, S        (H)        185   ml       115        18 - IVS/PW, ED             0.99         0.86       --------                                                     58 EDV
(H) 186   ml     220        62 - 150  Vol/bsa, S    (H)        80    ml/m   50         16 - ESV                    46    ml      106        21 - 61                                                      34 SV                     107   ml     79         --------  Vol, ES, 1-p  (H)        185   ml       124        18 - EDV/bsa            (H) 80    ml/m 95         34 - 74   A4C                                                58 ESV/bsa                20    ml/m 46         11 - 31   Vol/bsa, ES,  (H)        80    ml/m   54         12 - SV/bsa                 46    ml/m 34          --------  1-p A4C                                            37 SV, 1-p A4C            101   ml     143        -------- SV/bsa, 1-p A4C        44    ml/m 62         --------  Mitral valve             Value          12/18/2019 Ref ESV, 2-p           (H) 77    ml     78         21 - 61   Peak E                   1.22  m/sec    1.14       ---- ESV/bsa, 2-p       (H) 33    ml/m 34         11 - 31   Decel time               218   ms       169         ---- E', lat jeffery, TDI   (L) 9.9   cm/sec 18.2       >=10      PHT                      64    ms       ---------- ---- E/e', lat jeffery, TDI     12           6          --------  Peak grad, D             6     mm Hg    5          ---- E', med jeffery, TDI       7.72  cm/sec 12.3       >=7       MVA, PHT                 3.4   cm     ---------- ---- E/e', med jeffery, TDI     16           9          --------  MVA/bsa, PHT             1.48  cm/m  ---------- ----                                                          Tricuspid valve          Value          12/18/2019 Ref                                                          TR peak v                2.7   m/sec    ---------- <=2.                                                                                                             8                                                          Peak RV-RA               29    mm Hg     ---------- ----
yes
SETH archibald                                                          Max TR margaret               2.71  m/sec    ---------- ----Legend:(L)  and  (H)  cecilia values outside specified reference range. 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 CKD 3 His baseline cr has been around 2.     post op rise in Cr now up to 4.2 mg/dl with marginal UOP with a reed in situ  likely has sustained ATN from intraop insults, not on pressors, has acceptable hemodynamics  no obstruction on US  trialysis catheter placed 12/19  -- started dialysis on 12/19. HD yesterdat- no need today and likely tomorrow. Will watch for recovery if any, will need permcath next week if dialysis needs persist. Continue current dose of lasix. --Sw working on placement close to the patient's home- [facility] [place] the [place]. Will be consider DREA with HD needs with the potential for renal recovery. S/p Duodenal Resection with Duodenojejunostomy, cholecystectomy, incisional hernia repair  GI and surgery following. 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     CHF s/p OHT: cardiology following  - immunosuppression per transplant team - MMF stopped given malignancy   Anemia: prbc per transplant team.
yes

## 2021-01-20 NOTE — PROGRESS NOTE ADULT - PROBLEM SELECTOR PLAN 4
- Remains afebrile. 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 Will recheck UA and UCx in case he passed a renal calculus  - Quantiferon negative   - COVID IGG ab positive, hx of COVID in April 2020  - Strongyloides ab pending   - CT C/A/P 1/11 shows no evidence of acute pathology in chest of abdomen

## 2021-01-20 NOTE — PROGRESS NOTE ADULT - PROBLEM SELECTOR PLAN 5
- Admitted with constipation. Of note was on Miralax and Senna as outpatient. Has been given Mag Citrate x 2  - Abdominal X-ray 1/11 shows nonobstructive bowel gas pattern with Large stool burden noted throughout the colon.

## 2021-01-20 NOTE — PROGRESS NOTE ADULT - ASSESSMENT
66 YO M with a history of stage D NICM s/p HM2 on 9/2017 as DT (due to severe PAD) with TV ring, prior COVID-19 infection 4/2020, recurrent syncope post LVAD s/p ILR, and chronic abdominal pain with prior negative workup who was admitted with abdominal pain in setting of constipation. CT abdomen performed without any acute pathology. He also developed fevers 1/12 with negative infectious workup aside from a single blood culture positive for propionobacterium acnes and found to have pancytopenia also with negative workup and has now resolved with just stable microcytic anemia at this time. Was transfused with 2uPRBC (last on 1/19). GI consulted and there is low suspicion for GIB, he does not require repeat endoscopic workup at this time (was previously performed and negative). At this time no signs of LVAD dysfunction or alarms noted at this time. Remains hemodynamically stable and will be discharged home today.

## 2021-01-20 NOTE — PROVIDER CONTACT NOTE (CRITICAL VALUE NOTIFICATION) - RECOMMENDATIONS
Hold heparin gtt for 60 minutes, then resume at 20 ml/hr
Continue to monitor.
Continue to monitor labs.

## 2021-01-20 NOTE — PROGRESS NOTE ADULT - PROBLEM SELECTOR PROBLEM 3
LVAD (left ventricular assist device) present
LVAD (left ventricular assist device) present
Abdominal pain
LVAD (left ventricular assist device) present
Abdominal pain
Abdominal pain
LVAD (left ventricular assist device) present
LVAD (left ventricular assist device) present
Abdominal pain
LVAD (left ventricular assist device) present
LVAD (left ventricular assist device) present

## 2021-01-20 NOTE — PROGRESS NOTE ADULT - REASON FOR ADMISSION
fevers, anemia, abdominal pain and constipation
admit    anemia  guaic neg
fevers, anemia, abdominal pain and constipation

## 2021-01-20 NOTE — PROVIDER CONTACT NOTE (OTHER) - REASON
MAP 94
ptt: 52.4, on pt specific heparin
PTT 48.5
PTT 68.5
Pt MAP 95
Ptt: 49.1. on pt specific heparin
aPTT: 55.4, on pt specific heparin
aPTT: 61.3, on pt specific heparin
13beats WCT
Pt has tears in driveline wire
Ptt: 35.2, on pt specific heparin

## 2021-01-20 NOTE — PROGRESS NOTE ADULT - PROBLEM SELECTOR PROBLEM 5
Constipation

## 2021-01-21 ENCOUNTER — APPOINTMENT (OUTPATIENT)
Dept: ELECTROPHYSIOLOGY | Facility: CLINIC | Age: 66
End: 2021-01-21
Payer: MEDICARE

## 2021-01-21 ENCOUNTER — APPOINTMENT (OUTPATIENT)
Dept: HEART FAILURE | Facility: CLINIC | Age: 66
End: 2021-01-21

## 2021-01-21 LAB
CREATININE, URINE RESULT: 135 MG/DL — SIGNIFICANT CHANGE UP
PROT ?TM UR-MCNC: 13 MG/DL — HIGH (ref 0–12)

## 2021-01-21 PROCEDURE — 93298 REM INTERROG DEV EVAL SCRMS: CPT

## 2021-01-21 PROCEDURE — G2066: CPT

## 2021-01-22 LAB
CULTURE RESULTS: SIGNIFICANT CHANGE UP
CULTURE RESULTS: SIGNIFICANT CHANGE UP
SPECIMEN SOURCE: SIGNIFICANT CHANGE UP
SPECIMEN SOURCE: SIGNIFICANT CHANGE UP

## 2021-01-23 LAB
% GAMMA, URINE: 12.2 % — SIGNIFICANT CHANGE UP
ALBUMIN 24H MFR UR ELPH: 8.7 % — SIGNIFICANT CHANGE UP
ALPHA1 GLOB 24H MFR UR ELPH: 38.3 % — SIGNIFICANT CHANGE UP
ALPHA2 GLOB 24H MFR UR ELPH: 22.5 % — SIGNIFICANT CHANGE UP
B-GLOBULIN 24H MFR UR ELPH: 18.3 % — SIGNIFICANT CHANGE UP
INTERPRETATION 24H UR IFE-IMP: SIGNIFICANT CHANGE UP
INTERPRETATION 24H UR IFE-IMP: SIGNIFICANT CHANGE UP
M PROTEIN 24H UR ELPH-MRATE: SIGNIFICANT CHANGE UP
PROT ?TM UR-MCNC: 13 MG/DL — HIGH (ref 0–12)
PROT PATTERN 24H UR ELPH-IMP: SIGNIFICANT CHANGE UP
TOTAL VOLUME - 24 HOUR: SIGNIFICANT CHANGE UP ML
URINE CREATININE CALCULATION: SIGNIFICANT CHANGE UP G/24 H (ref 1–2)

## 2021-01-25 ENCOUNTER — RX RENEWAL (OUTPATIENT)
Age: 66
End: 2021-01-25

## 2021-01-26 ENCOUNTER — NON-APPOINTMENT (OUTPATIENT)
Age: 66
End: 2021-01-26

## 2021-01-29 ENCOUNTER — LABORATORY RESULT (OUTPATIENT)
Age: 66
End: 2021-01-29

## 2021-01-29 ENCOUNTER — APPOINTMENT (OUTPATIENT)
Dept: HEART FAILURE | Facility: CLINIC | Age: 66
End: 2021-01-29
Payer: MEDICARE

## 2021-01-29 VITALS
WEIGHT: 174 LBS | OXYGEN SATURATION: 100 % | SYSTOLIC BLOOD PRESSURE: 96 MMHG | RESPIRATION RATE: 16 BRPM | HEIGHT: 70 IN | HEART RATE: 83 BPM | TEMPERATURE: 98.3 F | DIASTOLIC BLOOD PRESSURE: 62 MMHG | BODY MASS INDEX: 24.91 KG/M2

## 2021-01-29 PROCEDURE — 99072 ADDL SUPL MATRL&STAF TM PHE: CPT

## 2021-01-29 PROCEDURE — 93750 INTERROGATION VAD IN PERSON: CPT

## 2021-01-29 PROCEDURE — 99213 OFFICE O/P EST LOW 20 MIN: CPT

## 2021-01-29 NOTE — PHYSICAL EXAM
[General Appearance - Well Developed] : well developed [General Appearance - Well Nourished] : well nourished [Normal Conjunctiva] : the conjunctiva exhibited no abnormalities [Normal Oral Mucosa] : normal oral mucosa [Normal Jugular Venous V Waves Present] : normal jugular venous V waves present [Respiration, Rhythm And Depth] : normal respiratory rhythm and effort [Auscultation Breath Sounds / Voice Sounds] : lungs were clear to auscultation bilaterally [Heart Rate And Rhythm] : heart rate and rhythm were normal [Bowel Sounds] : normal bowel sounds [Abdomen Soft] : soft [Abdomen Tenderness] : non-tender [Abnormal Walk] : normal gait [Nail Clubbing] : no clubbing of the fingernails [Cyanosis, Localized] : no localized cyanosis [Petechial Hemorrhages (___cm)] : no petechial hemorrhages [Skin Color & Pigmentation] : normal skin color and pigmentation [] : no rash [Oriented To Time, Place, And Person] : oriented to person, place, and time [No Anxiety] : not feeling anxious [FreeTextEntry1] : +hum of LVAD

## 2021-01-29 NOTE — DISCUSSION/SUMMARY
[___ Week(s)] : [unfilled] week(s) [FreeTextEntry1] : - LVAD: continue speed at 8800. Appears to be pulsatilie.  \par - Congestive heart failure: Metoprolol at currently doses. \par - Coumadin: Monitor INR closely and adjust warfarin accordingly, INR goal is 2.0-2.5. \par - Anemia: started on Iron, Folic acid, Vit. B12 and senna\par - Antiplatelets: Continue aspirin at 81mg every day. \par - PAD: Ongoing treatment by Dr. Lovelace. \par - CKD stage III with hyperkalemia; improved from prior \par - Clavicle FX: resolved\par - Driveline drainage. Will start Doxycycline 100 BID X 14 days\par - Education of LVAD system maintenance was reviewed with patient.\par - Supplies: Gary

## 2021-01-29 NOTE — HISTORY OF PRESENT ILLNESS
[FreeTextEntry1] : Mr. Quispe is a 64 year old man with ACC/AHA stage D systolic heart failure due to a nonischemic cardiomyopathy who presented with abdominal pain and cardiogenic shock in the fall of 2017. Due to inotropic dependence, he underwent HM2 LVAD implantation as destination therapy with TV annuloplasty ring on 9/12/2017. His post-operative course was largely uncomplicated. He had mild rectal bleeding due to internal hemorrhoids. He had one readmission due to infection of unclear etiology in December 2017. It was not thought to be related to the LVAD. His history is also notable for peripheral arterial disease with claudication, which is a contributing factor in the decision to not list him for heart transplantation. \par Patient was admitted to the hospital from  March 31 through 4/20 with COVID + was having low bp and meds were discontinued.\par \par Patient was again admitted from 1/11/21-1/20/21 with abdominal pain he had an abd xray and had lots of stool he had a EGD due to drop in H/H.\par Today, he overall feels better he states his belly feels better. He says he had a BM this am and his abd pain moves I think it is gas but he says BM are small and hard.   \par He denies LVAD alarms. \par \par LVAD Interrogation:\par Device Type: HM2\par Speed: 8800\par Flow: 6.1\par Power: 5.5\par PI: 6.7\par MAP: 100 with doppler (BP 92/62, MAP: 74)\par \par WEIGHT: 174 with equipment\par Alarm: No alarms\par Back up battery: Primary Battery -119 Expires in 15 months \par Driveline dressing change: incision without erythema , swelling or drainage but dressing did have a small amount of drainage noted on it. Unable to express any drainage

## 2021-02-01 LAB
ALBUMIN SERPL ELPH-MCNC: 4.2 G/DL
ALP BLD-CCNC: 69 U/L
ALT SERPL-CCNC: 17 U/L
ANION GAP SERPL CALC-SCNC: 11 MMOL/L
AST SERPL-CCNC: 27 U/L
BASOPHILS # BLD AUTO: 0.09 K/UL
BASOPHILS NFR BLD AUTO: 1.9 %
BILIRUB SERPL-MCNC: 0.4 MG/DL
BUN SERPL-MCNC: 11 MG/DL
CALCIUM SERPL-MCNC: 9.1 MG/DL
CHLORIDE SERPL-SCNC: 102 MMOL/L
CO2 SERPL-SCNC: 24 MMOL/L
CREAT SERPL-MCNC: 1.31 MG/DL
EOSINOPHIL # BLD AUTO: 0.1 K/UL
EOSINOPHIL NFR BLD AUTO: 2.1 %
GLUCOSE SERPL-MCNC: 100 MG/DL
HCT VFR BLD CALC: 27.2 %
HGB BLD-MCNC: 7.6 G/DL
IMM GRANULOCYTES NFR BLD AUTO: 0.2 %
LYMPHOCYTES # BLD AUTO: 0.94 K/UL
LYMPHOCYTES NFR BLD AUTO: 20.2 %
MAN DIFF?: NORMAL
MCHC RBC-ENTMCNC: 23.6 PG
MCHC RBC-ENTMCNC: 27.9 GM/DL
MCV RBC AUTO: 84.5 FL
MONOCYTES # BLD AUTO: 0.59 K/UL
MONOCYTES NFR BLD AUTO: 12.7 %
NEUTROPHILS # BLD AUTO: 2.93 K/UL
NEUTROPHILS NFR BLD AUTO: 62.9 %
PLATELET # BLD AUTO: 217 K/UL
POTASSIUM SERPL-SCNC: 4.2 MMOL/L
PROT SERPL-MCNC: 7.2 G/DL
RBC # BLD: 3.22 M/UL
RBC # FLD: 20 %
SODIUM SERPL-SCNC: 137 MMOL/L
WBC # FLD AUTO: 4.66 K/UL

## 2021-02-10 ENCOUNTER — NON-APPOINTMENT (OUTPATIENT)
Age: 66
End: 2021-02-10

## 2021-02-11 ENCOUNTER — NON-APPOINTMENT (OUTPATIENT)
Age: 66
End: 2021-02-11

## 2021-02-11 ENCOUNTER — APPOINTMENT (OUTPATIENT)
Dept: HEART FAILURE | Facility: CLINIC | Age: 66
End: 2021-02-11
Payer: MEDICARE

## 2021-02-11 VITALS
SYSTOLIC BLOOD PRESSURE: 109 MMHG | WEIGHT: 173 LBS | OXYGEN SATURATION: 100 % | TEMPERATURE: 98 F | DIASTOLIC BLOOD PRESSURE: 69 MMHG | BODY MASS INDEX: 24.77 KG/M2 | HEART RATE: 73 BPM | RESPIRATION RATE: 16 BRPM | HEIGHT: 70 IN

## 2021-02-11 PROCEDURE — 99072 ADDL SUPL MATRL&STAF TM PHE: CPT

## 2021-02-11 PROCEDURE — 93750 INTERROGATION VAD IN PERSON: CPT

## 2021-02-11 PROCEDURE — 99214 OFFICE O/P EST MOD 30 MIN: CPT

## 2021-02-11 PROCEDURE — 93000 ELECTROCARDIOGRAM COMPLETE: CPT

## 2021-02-11 NOTE — DISCUSSION/SUMMARY
[___ Week(s)] : [unfilled] week(s) [FreeTextEntry1] : - LVAD: continue speed at 8800. Appears to be pulsatilie.  \par - Congestive heart failure: Metoprolol at currently doses. \par - Coumadin: Monitor INR closely and adjust warfarin accordingly, INR goal is 2.0-2.5. \par - Anemia: started on Iron, Folic acid, Vit. B12 and senna\par - Antiplatelets: Continue aspirin at 81mg every day. \par - PAD: Ongoing treatment by Dr. Lovelace. \par - CKD stage III with hyperkalemia; improved from prior \par - Clavicle FX: resolved\par - Constipation given ducolox supp and sorbitol\par - Driveline drainage. Holding Doxycycline 100 BID X 14 days\par - Education of LVAD system maintenance was reviewed with patient.\par - Supplies: Gary

## 2021-02-11 NOTE — PHYSICAL EXAM
[General Appearance - Well Developed] : well developed [General Appearance - Well Nourished] : well nourished [Normal Conjunctiva] : the conjunctiva exhibited no abnormalities [Normal Oral Mucosa] : normal oral mucosa [Normal Jugular Venous V Waves Present] : normal jugular venous V waves present [Respiration, Rhythm And Depth] : normal respiratory rhythm and effort [Auscultation Breath Sounds / Voice Sounds] : lungs were clear to auscultation bilaterally [Heart Rate And Rhythm] : heart rate and rhythm were normal [Bowel Sounds] : normal bowel sounds [Abdomen Soft] : soft [Abdomen Tenderness] : non-tender [Abnormal Walk] : normal gait [Nail Clubbing] : no clubbing of the fingernails [Cyanosis, Localized] : no localized cyanosis [Skin Color & Pigmentation] : normal skin color and pigmentation [] : no rash [Oriented To Time, Place, And Person] : oriented to person, place, and time [No Anxiety] : not feeling anxious [FreeTextEntry1] : +hu,m of LVAD

## 2021-02-11 NOTE — HISTORY OF PRESENT ILLNESS
[FreeTextEntry1] : Mr. Quispe is a 64 year old man with ACC/AHA stage D systolic heart failure due to a nonischemic cardiomyopathy who presented with abdominal pain and cardiogenic shock in the fall of 2017. Due to inotropic dependence, he underwent HM2 LVAD implantation as destination therapy with TV annuloplasty ring on 9/12/2017. His post-operative course was largely uncomplicated. He had mild rectal bleeding due to internal hemorrhoids. He had one readmission due to infection of unclear etiology in December 2017. It was not thought to be related to the LVAD. His history is also notable for peripheral arterial disease with claudication, which is a contributing factor in the decision to not list him for heart transplantation. \par Patient was admitted to the hospital from  March 31 through 4/20 with COVID + was having low bp and meds were discontinued.\par \par Today, he overall feels better he states his belly feels better. He says he had a BM this am but it was small and his abd pain moved to the left.    \par He denies LVAD alarms. \par \par LVAD Interrogation:\par Device Type: HM2\par Speed: 8800\par Flow: 5.3\par Power: 5.1\par PI: 6.4\par MAP: 110 with doppler (/69, MAP: 82)\par \par WEIGHT: 173 with equipment\par Alarm: No alarms\par Back up battery: Primary Battery -941 Expires in 14 months \par Driveline dressing change: incision without erythema , swelling or drainage but dressing did have a small amount of drainage noted on it. Unable to express any drainage

## 2021-02-16 ENCOUNTER — NON-APPOINTMENT (OUTPATIENT)
Age: 66
End: 2021-02-16

## 2021-02-25 ENCOUNTER — APPOINTMENT (OUTPATIENT)
Dept: HEART FAILURE | Facility: CLINIC | Age: 66
End: 2021-02-25
Payer: MEDICARE

## 2021-02-25 ENCOUNTER — APPOINTMENT (OUTPATIENT)
Dept: ELECTROPHYSIOLOGY | Facility: CLINIC | Age: 66
End: 2021-02-25
Payer: MEDICARE

## 2021-02-25 VITALS
DIASTOLIC BLOOD PRESSURE: 61 MMHG | TEMPERATURE: 98 F | SYSTOLIC BLOOD PRESSURE: 96 MMHG | HEIGHT: 70 IN | HEART RATE: 80 BPM | RESPIRATION RATE: 16 BRPM | OXYGEN SATURATION: 98 % | BODY MASS INDEX: 25.05 KG/M2 | WEIGHT: 175 LBS

## 2021-02-25 DIAGNOSIS — K59.09 OTHER CONSTIPATION: ICD-10-CM

## 2021-02-25 DIAGNOSIS — T82.7XXA INFECTION AND INFLAMMATORY REACTION DUE TO OTHER CARDIAC AND VASCULAR DEVICES, IMPLANTS AND GRAFTS, INITIAL ENCOUNTER: ICD-10-CM

## 2021-02-25 PROCEDURE — 93750 INTERROGATION VAD IN PERSON: CPT

## 2021-02-25 PROCEDURE — 99072 ADDL SUPL MATRL&STAF TM PHE: CPT

## 2021-02-25 PROCEDURE — G2066: CPT

## 2021-02-25 PROCEDURE — 99213 OFFICE O/P EST LOW 20 MIN: CPT

## 2021-02-25 PROCEDURE — 93298 REM INTERROG DEV EVAL SCRMS: CPT

## 2021-02-25 RX ORDER — FERROUS SULFATE 325(65) MG
325 (65 FE) TABLET ORAL 3 TIMES DAILY
Qty: 270 | Refills: 3 | Status: DISCONTINUED | COMMUNITY
Start: 2020-08-13 | End: 2021-02-25

## 2021-02-25 RX ORDER — DOCUSATE SODIUM 100 MG/1
100 CAPSULE, LIQUID FILLED ORAL
Qty: 120 | Refills: 3 | Status: DISCONTINUED | COMMUNITY
Start: 2020-12-03 | End: 2021-02-25

## 2021-02-25 RX ORDER — DOXYCYCLINE HYCLATE 100 MG/1
100 CAPSULE ORAL
Qty: 28 | Refills: 2 | Status: DISCONTINUED | COMMUNITY
Start: 2021-01-07 | End: 2021-02-25

## 2021-02-25 NOTE — HISTORY OF PRESENT ILLNESS
[FreeTextEntry1] : Mr. Quispe is a 64 year old man with ACC/AHA stage D systolic heart failure due to a nonischemic cardiomyopathy who presented with abdominal pain and cardiogenic shock in the fall of 2017. Due to inotropic dependence, he underwent HM2 LVAD implantation as destination therapy with TV annuloplasty ring on 9/12/2017. His post-operative course was largely uncomplicated. He had mild rectal bleeding due to internal hemorrhoids. He had one readmission due to infection of unclear etiology in December 2017. It was not thought to be related to the LVAD. His history is also notable for peripheral arterial disease with claudication, which is a contributing factor in the decision to not list him for heart transplantation. \par Patient was admitted to the hospital from  March 31 through 4/20 with COVID + was having low bp and meds were discontinued.\par \par Today, he overall feels better he states his belly feels better. He says he had a BM this am and yesterday. He does have pain after he has breakfast but it isn't intolerable\par He denies LVAD alarms. \par \par LVAD Interrogation:\par Device Type: HM2\par Speed: 8800\par Flow: 5.7\par Power: 5.4\par PI: 6.6\par MAP: 100 with doppler (BP 96/61, MAP: 73)\par WEIGHT: 175 with equipment\par Alarm: No alarms\par Back up battery: Primary Battery -113 Expires in 14 months \par Driveline dressing change: incision without erythema , swelling or drainage but dressing did have a small amount of drainage noted on it. Unable to express any drainage, Did get the tip of the culture swab into the driveline and sent

## 2021-02-25 NOTE — DISCUSSION/SUMMARY
[___ Week(s)] : [unfilled] week(s) [FreeTextEntry1] : - LVAD: continue speed at 8800. Appears to be pulsatilie.  \par - Congestive heart failure: Metoprolol at currently doses. \par - Coumadin: Monitor INR closely and adjust warfarin accordingly, INR goal is 2.0-2.5. \par - Anemia: stopped Iron, cont  Folic acid, Vit. B12 and senna\par - Antiplatelets: Continue aspirin at 81mg every day. \par - PAD: Ongoing treatment by Dr. Lovelace. \par - CKD stage III with hyperkalemia; improved from prior \par - Clavicle FX: resolved\par - Constipation given ducolox supp, senna and miralax\par - Driveline drainage: no antibiotics\par - Education of LVAD system maintenance was reviewed with patient.\par - Supplies: Gary

## 2021-02-25 NOTE — PHYSICAL EXAM
[General Appearance - Well Developed] : well developed [Normal Appearance] : normal appearance [General Appearance - Well Nourished] : well nourished [Normal Conjunctiva] : the conjunctiva exhibited no abnormalities [Normal Oral Mucosa] : normal oral mucosa [Normal Jugular Venous V Waves Present] : normal jugular venous V waves present [Respiration, Rhythm And Depth] : normal respiratory rhythm and effort [Auscultation Breath Sounds / Voice Sounds] : lungs were clear to auscultation bilaterally [Heart Rate And Rhythm] : heart rate and rhythm were normal [Bowel Sounds] : normal bowel sounds [Abdomen Soft] : soft [Abdomen Tenderness] : non-tender [Abnormal Walk] : normal gait [Nail Clubbing] : no clubbing of the fingernails [Skin Color & Pigmentation] : normal skin color and pigmentation [] : no rash [Oriented To Time, Place, And Person] : oriented to person, place, and time [No Anxiety] : not feeling anxious [FreeTextEntry1] : +hum of the VAD

## 2021-03-02 ENCOUNTER — NON-APPOINTMENT (OUTPATIENT)
Age: 66
End: 2021-03-02

## 2021-03-03 LAB — BACTERIA WND CULT: NORMAL

## 2021-03-15 NOTE — ED ADULT NURSE REASSESSMENT NOTE - NS ED NURSE REASSESS COMMENT FT1
"""Recommend Lateral Tarsal Strip of the Left Lower Eyelid 03/23/2021 at [address] ""
Attempt made to straight catheterize patient as per MD order, resistance met when attempting to obtain urine sample. Unable to obtain urine sample. MD Garcia aware, urology to be called.
Paged LVAD team, spoke with Tamanna at ([phone]), aware that patient is in ED. MD Alfonso and Jose aware that LVAD team was called.
Family at bedside. Pending RVP result. Admitted to tele, awaiting bed.
pt. used urinal to void  without difficulty upon returning from CT scan. UA sent as ordered. medications given as ordered. pt. on CM. family at bedside.

## 2021-03-16 PROCEDURE — 83550 IRON BINDING TEST: CPT

## 2021-03-16 PROCEDURE — 85730 THROMBOPLASTIN TIME PARTIAL: CPT

## 2021-03-16 PROCEDURE — 84132 ASSAY OF SERUM POTASSIUM: CPT

## 2021-03-16 PROCEDURE — 86682 HELMINTH ANTIBODY: CPT

## 2021-03-16 PROCEDURE — 74176 CT ABD & PELVIS W/O CONTRAST: CPT

## 2021-03-16 PROCEDURE — 81003 URINALYSIS AUTO W/O SCOPE: CPT

## 2021-03-16 PROCEDURE — 84295 ASSAY OF SERUM SODIUM: CPT

## 2021-03-16 PROCEDURE — P9016: CPT

## 2021-03-16 PROCEDURE — 85610 PROTHROMBIN TIME: CPT

## 2021-03-16 PROCEDURE — 86706 HEP B SURFACE ANTIBODY: CPT

## 2021-03-16 PROCEDURE — U0005: CPT

## 2021-03-16 PROCEDURE — 93005 ELECTROCARDIOGRAM TRACING: CPT

## 2021-03-16 PROCEDURE — 87040 BLOOD CULTURE FOR BACTERIA: CPT

## 2021-03-16 PROCEDURE — 86923 COMPATIBILITY TEST ELECTRIC: CPT

## 2021-03-16 PROCEDURE — 85045 AUTOMATED RETICULOCYTE COUNT: CPT

## 2021-03-16 PROCEDURE — 82306 VITAMIN D 25 HYDROXY: CPT

## 2021-03-16 PROCEDURE — 83540 ASSAY OF IRON: CPT

## 2021-03-16 PROCEDURE — 84166 PROTEIN E-PHORESIS/URINE/CSF: CPT

## 2021-03-16 PROCEDURE — 80048 BASIC METABOLIC PNL TOTAL CA: CPT

## 2021-03-16 PROCEDURE — 82746 ASSAY OF FOLIC ACID SERUM: CPT

## 2021-03-16 PROCEDURE — 84155 ASSAY OF PROTEIN SERUM: CPT

## 2021-03-16 PROCEDURE — 86850 RBC ANTIBODY SCREEN: CPT

## 2021-03-16 PROCEDURE — 74018 RADEX ABDOMEN 1 VIEW: CPT

## 2021-03-16 PROCEDURE — 36430 TRANSFUSION BLD/BLD COMPNT: CPT

## 2021-03-16 PROCEDURE — 87150 DNA/RNA AMPLIFIED PROBE: CPT

## 2021-03-16 PROCEDURE — 86901 BLOOD TYPING SEROLOGIC RH(D): CPT

## 2021-03-16 PROCEDURE — 86880 COOMBS TEST DIRECT: CPT

## 2021-03-16 PROCEDURE — 71250 CT THORAX DX C-: CPT

## 2021-03-16 PROCEDURE — 86790 VIRUS ANTIBODY NOS: CPT

## 2021-03-16 PROCEDURE — 80053 COMPREHEN METABOLIC PANEL: CPT

## 2021-03-16 PROCEDURE — 83880 ASSAY OF NATRIURETIC PEPTIDE: CPT

## 2021-03-16 PROCEDURE — 87799 DETECT AGENT NOS DNA QUANT: CPT

## 2021-03-16 PROCEDURE — 82330 ASSAY OF CALCIUM: CPT

## 2021-03-16 PROCEDURE — 74019 RADEX ABDOMEN 2 VIEWS: CPT

## 2021-03-16 PROCEDURE — 87385 HISTOPLASMA CAPSUL AG IA: CPT

## 2021-03-16 PROCEDURE — 82272 OCCULT BLD FECES 1-3 TESTS: CPT

## 2021-03-16 PROCEDURE — 99285 EMERGENCY DEPT VISIT HI MDM: CPT | Mod: 25

## 2021-03-16 PROCEDURE — 85027 COMPLETE CBC AUTOMATED: CPT

## 2021-03-16 PROCEDURE — 80074 ACUTE HEPATITIS PANEL: CPT

## 2021-03-16 PROCEDURE — 83615 LACTATE (LD) (LDH) ENZYME: CPT

## 2021-03-16 PROCEDURE — 83521 IG LIGHT CHAINS FREE EACH: CPT

## 2021-03-16 PROCEDURE — 85018 HEMOGLOBIN: CPT

## 2021-03-16 PROCEDURE — 86480 TB TEST CELL IMMUN MEASURE: CPT

## 2021-03-16 PROCEDURE — 82947 ASSAY GLUCOSE BLOOD QUANT: CPT

## 2021-03-16 PROCEDURE — 83605 ASSAY OF LACTIC ACID: CPT

## 2021-03-16 PROCEDURE — 87389 HIV-1 AG W/HIV-1&-2 AB AG IA: CPT

## 2021-03-16 PROCEDURE — 86335 IMMUNFIX E-PHORSIS/URINE/CSF: CPT

## 2021-03-16 PROCEDURE — 81001 URINALYSIS AUTO W/SCOPE: CPT

## 2021-03-16 PROCEDURE — 82803 BLOOD GASES ANY COMBINATION: CPT

## 2021-03-16 PROCEDURE — 84165 PROTEIN E-PHORESIS SERUM: CPT

## 2021-03-16 PROCEDURE — 85014 HEMATOCRIT: CPT

## 2021-03-16 PROCEDURE — 82435 ASSAY OF BLOOD CHLORIDE: CPT

## 2021-03-16 PROCEDURE — U0003: CPT

## 2021-03-16 PROCEDURE — 85025 COMPLETE CBC W/AUTO DIFF WBC: CPT

## 2021-03-16 PROCEDURE — 82728 ASSAY OF FERRITIN: CPT

## 2021-03-16 PROCEDURE — 86334 IMMUNOFIX E-PHORESIS SERUM: CPT

## 2021-03-16 PROCEDURE — 86900 BLOOD TYPING SEROLOGIC ABO: CPT

## 2021-03-16 PROCEDURE — 86769 SARS-COV-2 COVID-19 ANTIBODY: CPT

## 2021-03-16 PROCEDURE — 83010 ASSAY OF HAPTOGLOBIN QUANT: CPT

## 2021-03-16 PROCEDURE — 84156 ASSAY OF PROTEIN URINE: CPT

## 2021-03-16 PROCEDURE — 86704 HEP B CORE ANTIBODY TOTAL: CPT

## 2021-03-17 ENCOUNTER — NON-APPOINTMENT (OUTPATIENT)
Age: 66
End: 2021-03-17

## 2021-03-25 ENCOUNTER — OUTPATIENT (OUTPATIENT)
Dept: OUTPATIENT SERVICES | Facility: HOSPITAL | Age: 66
LOS: 1 days | End: 2021-03-25
Payer: MEDICARE

## 2021-03-25 ENCOUNTER — NON-APPOINTMENT (OUTPATIENT)
Age: 66
End: 2021-03-25

## 2021-03-25 ENCOUNTER — APPOINTMENT (OUTPATIENT)
Dept: HEART FAILURE | Facility: CLINIC | Age: 66
End: 2021-03-25
Payer: MEDICARE

## 2021-03-25 ENCOUNTER — APPOINTMENT (OUTPATIENT)
Dept: CV DIAGNOSITCS | Facility: HOSPITAL | Age: 66
End: 2021-03-25

## 2021-03-25 VITALS
TEMPERATURE: 98.2 F | SYSTOLIC BLOOD PRESSURE: 120 MMHG | HEART RATE: 86 BPM | HEIGHT: 70 IN | OXYGEN SATURATION: 97 % | RESPIRATION RATE: 16 BRPM | DIASTOLIC BLOOD PRESSURE: 69 MMHG | BODY MASS INDEX: 25.77 KG/M2 | WEIGHT: 180 LBS

## 2021-03-25 DIAGNOSIS — Z95.811 PRESENCE OF HEART ASSIST DEVICE: ICD-10-CM

## 2021-03-25 DIAGNOSIS — Z95.811 PRESENCE OF HEART ASSIST DEVICE: Chronic | ICD-10-CM

## 2021-03-25 DIAGNOSIS — Z98.890 OTHER SPECIFIED POSTPROCEDURAL STATES: Chronic | ICD-10-CM

## 2021-03-25 DIAGNOSIS — I73.9 PERIPHERAL VASCULAR DISEASE, UNSPECIFIED: ICD-10-CM

## 2021-03-25 PROCEDURE — 99072 ADDL SUPL MATRL&STAF TM PHE: CPT

## 2021-03-25 PROCEDURE — 93306 TTE W/DOPPLER COMPLETE: CPT

## 2021-03-25 PROCEDURE — 94618 PULMONARY STRESS TESTING: CPT

## 2021-03-25 PROCEDURE — 93306 TTE W/DOPPLER COMPLETE: CPT | Mod: 26

## 2021-03-25 PROCEDURE — 93750 INTERROGATION VAD IN PERSON: CPT

## 2021-03-25 PROCEDURE — 99214 OFFICE O/P EST MOD 30 MIN: CPT | Mod: 25

## 2021-03-25 PROCEDURE — 93000 ELECTROCARDIOGRAM COMPLETE: CPT | Mod: 59

## 2021-04-08 NOTE — DISCUSSION/SUMMARY
[___ Week(s)] : [unfilled] week(s) [FreeTextEntry1] : - LVAD: continue speed at 8800. Appears to be pulsatilie.  \par - Congestive heart failure: Metoprolol at currently doses. \par - Coumadin: Monitor INR closely and adjust warfarin accordingly, INR goal is 2.0-2.5. \par - Anemia: stopped Iron, cont  Folic acid, Vit. B12 and senna\par - Antiplatelets: Continue aspirin at 81mg every day. \par - PAD: Ongoing treatment by Dr. Lovelace. May need to be rereferred to see him if he is having pain\par - CKD stage III without Hyperkalemis\par - Clavicle FX: resolved\par - Constipation given ducolox supp, senna and miralax, much improved having BM every day\par - Driveline drainage: no antibiotics but will use silver\par - Education of LVAD system maintenance was reviewed with patient.\par - Supplies: Gary

## 2021-04-08 NOTE — HISTORY OF PRESENT ILLNESS
[FreeTextEntry1] : Mr. Quispe is a 64 year old man with ACC/AHA stage D systolic heart failure due to a nonischemic cardiomyopathy who presented with abdominal pain and cardiogenic shock in the fall of 2017. Due to inotropic dependence, he underwent HM2 LVAD implantation as destination therapy with TV annuloplasty ring on 9/12/2017. His post-operative course was largely uncomplicated. He had mild rectal bleeding due to internal hemorrhoids. He had one readmission due to infection of unclear etiology in December 2017. It was not thought to be related to the LVAD. His history is also notable for peripheral arterial disease with claudication, which is a contributing factor in the decision to not list him for heart transplantation. \par Patient was admitted to the hospital from  March 31 through 4/20 with COVID + was having low bp and meds were discontinued.\par \par Today, he overall feels better he states his belly feels better. He says he had a BM this am and yesterday. He does have pain ocasionally but it is better. Denies Dark, tarry stool, denies SOB, Denies CP. Overall feels well\par He denies LVAD alarms. \par \par LVAD Interrogation:\par Device Type: HM2\par Speed: 8800\par Flow: 4.8\par Power: 4.9\par PI: 7.1\par MAP: 100 with doppler (/69, MAP: 86)\par WEIGHT: 180 with equipment\par EKG: SR HR 75 no ectopy\par Alarm: No alarms\par Back up battery: Primary Battery -928 Expires in 13 months  Few PI events with Speed drop to 8600 X 3\par Driveline dressing change: incision without erythema , swelling or drainage but dressing did have a small amount of drainage noted on it. Unable to express any drainage\par Patient did a 6 MWT but stopped after 5 min as he said he had to go. He walked 253.1 m using a cane

## 2021-04-15 NOTE — BEHAVIORAL HEALTH ASSESSMENT NOTE - NS ED BHA REVIEW OF ED CHART AVAILABLE IMAGING REVIEWED
[facility] Transitions Initial Follow Up Call    Call within 2 business days of discharge: Yes    Patient:  Ana María Guan Patient :  1986  MRN:  [id number]   Reason for Admission:  covid 19   Discharge Date:  21  RARS: 8      CTC attempt to reach Pt regarding recent hospital discharge. CTC left voice recording with call back number requesting a call back. Follow up appointments:    Future Appointments   Date Time Provider Maddie Ponce   2021 12:30 PM DO INGA Schaefer [facility]       KESHAWN Belcher AsaN, RN  Care Transition Coordinator  Contact Number:  [phone] 24-Mar-2019 17:44 Yes

## 2021-04-27 NOTE — ED ADULT NURSE NOTE - NS ED NURSE LEVEL OF CONSCIOUSNESS SPEECH
Western Wisconsin Health [phone].        Speaking Coherently

## 2021-05-13 PROBLEM — I50.20 ACC/AHA STAGE D SYSTOLIC HEART FAILURE: Status: ACTIVE | Noted: 2018-02-08

## 2021-05-13 PROBLEM — T82.7XXA INFECTION ASSOCIATED WITH DRIVELINE OF LEFT VENTRICULAR ASSIST DEVICE (LVAD): Status: ACTIVE | Noted: 2021-01-07

## 2021-05-13 PROBLEM — M53.3 SACRAL BACK PAIN: Status: ACTIVE | Noted: 2019-03-13

## 2021-05-13 NOTE — HISTORY OF PRESENT ILLNESS
[FreeTextEntry1] : Mr. Quispe is a 64 year old man with ACC/AHA stage D systolic heart failure due to a nonischemic cardiomyopathy who presented with abdominal pain and cardiogenic shock in the fall of 2017. Due to inotropic dependence, he underwent HM2 LVAD implantation as destination therapy with TV annuloplasty ring on 9/12/2017. His post-operative course was largely uncomplicated. He had mild rectal bleeding due to internal hemorrhoids. He had one readmission due to infection of unclear etiology in December 2017. It was not thought to be related to the LVAD. His history is also notable for peripheral arterial disease with claudication, which is a contributing factor in the decision to not list him for heart transplantation. \par Patient was admitted to the hospital from  March 31 through 4/20 with COVID + was having low bp and meds were discontinued.\par \par Today, he overall feels better he states his belly feels better. He says he had a BM this am and yesterday. He does have pain ocasionally but it is better. Denies Dark, tarry stool, denies SOB, Denies CP. Overall feels well\par He denies LVAD alarms. \par \par LVAD Interrogation:\par Device Type: HM2\par Speed: 8800\par Flow: 5.6\par Power: 5.3\par PI: 6.5\par /70, MAP: 84)\par WEIGHT: 180 with equipment\par EKG: SR HR 75 no ectopy\par Alarm: No alarms\par Back up battery: Primary Battery -605 Expires in 11 months  Few PI events without Speed drop, 1 low flow alarm with speed drop to 8400\par Driveline dressing change: incision without erythema , swelling or drainage but dressing did have a small amount of drainage noted on it. Cx sent\par

## 2021-05-13 NOTE — DISCUSSION/SUMMARY
[___ Week(s)] : in [unfilled] week(s) [FreeTextEntry1] : - LVAD: continue speed at 8800. Appears to be pulsatilie.  \par - Congestive heart failure: Metoprolol at currently doses. \par - Coumadin: Monitor INR closely and adjust warfarin accordingly, INR goal is 2.0-2.5. \par - Anemia: stopped Iron, cont  Folic acid, Vit. B12 and senna\par - Antiplatelets: Continue aspirin at 81mg every day. \par - PAD: Ongoing treatment by Dr. Lovelace. May need to be rereferred to see him if he is having pain\par - CKD stage III without Hyperkalemis\par - Clavicle FX: resolved\par - Constipation much improved having BM every day\par - Driveline drainage: no antibiotics but will use silver\par - Education of LVAD system maintenance was reviewed with patient.\par - Supplies: Gary

## 2021-05-14 NOTE — CONSULT NOTE ADULT - ASSESSMENT
Lab came to writer stating the orders that are listed for Marlin Whiteside (a1c and micro) need to be switched to patients PCP.       Orders re-entered under Dr. Schoeneich.    Mr. Quispe is a 65 yo male with PMH of AHA stage D HF 2/2 NICM with HM2 LVAD (on coumadin) as destination therapy due to severe PAD, Depression, CKD-3 with hyperkalemia who presents with weakness, back and LE pain, found to have severe anemia.    # Anemia  # LVAD on coumadin  # PAD    Patient has anemia with no clear etiology. Hemodynamically stable with no chalino GIB noted. Unlikely to be hemolytic given normal LDH and bilirubin. Mr. Quispe is a 63 yo male with PMH of AHA stage D HF 2/2 NICM with HM2 LVAD (on coumadin) as destination therapy due to severe PAD, Depression, CKD-3 with hyperkalemia who presents with weakness, back and LE pain, found to have severe anemia.    # Anemia  # LVAD on coumadin  # PAD    Patient has anemia with no clear etiology. Hemodynamically stable with no chalino GIB noted. Unlikely to be hemolytic anemia given normal LDH and bilirubin. Given LVAD, patient is predisposed to AVMs as potential cause for the anemia. Will plan for EGD + Colonoscopy on Monday, +/- video capsule if needed.    Plan:  - Colonoscopy + endoscopy on Monday (possibly also with video capsule)  - AC  - Can restart diet for now  - Clear liquids on Sunday  - NPO on Sunday night  - Bowel prep for Sunday night (GI will order)  - Monitor CBC, INR  - Hb transtrusion goal per cardiology team  - If hemodynamically unstable consider CTA and IR consult    GI will follow with you.    Discussed with attending.    Pj Trevino, PGY-4  GI Fellow    Available on [employer] Teams  [phone]  After 5PM, please contact the on-call GI fellow: [phone] Mr. Quispe is a 65 yo male with PMH of AHA stage D HF 2/2 NICM with HM2 LVAD (on coumadin) as destination therapy due to severe PAD, Depression, CKD-3 with hyperkalemia who presents with weakness, back and LE pain, found to have severe anemia.    # Anemia  # LVAD on coumadin  # PAD    Patient has anemia with no clear etiology. Hemodynamically stable with no chalino GIB noted. Unlikely to be hemolytic anemia given normal LDH and bilirubin. Given LVAD, patient is predisposed to AVMs as potential cause for the anemia. Will plan for EGD + Colonoscopy on Monday, +/- video capsule if needed.    Plan:  - Colonoscopy + endoscopy on Monday (possibly also with video capsule)  - AC  - Can restart diet for now  - Clear liquids starting Saturday night  - NPO on [place] night  - Bowel prep for Sunday night (GI will order)  - Monitor CBC, INR  - Hb transtrusion goal per cardiology team  - If hemodynamically unstable consider CTA and IR consult    GI will follow with you.    Discussed with attending.    Pj Trevino, PGY-4  GI Fellow    Available on [employer] Teams  [phone]  After 5PM, please contact the on-call GI fellow: [phone]

## 2021-06-14 NOTE — CONSULT NOTE ADULT - ATTENDING COMMENTS
Patient seen and examined, agree with above. Patient with advanced heart failure, has LVAD and presented with recurrent anemia with supertherapeutic INR, now being reversed. He had negative EGD/colonoscopy/capsule endoscopy last year - would repeat capsule endoscopy now while INR is being corrected, so further endoscopic intervention can be guided based on capsule results. Keep NPO after midnight in case additional endoscopic intervention is needed based on capsule results.

## 2021-06-14 NOTE — CONSULT NOTE ADULT - ASSESSMENT
66 yo male with PMH of AHA stage D HF 2/2 NICM with HM2 LVAD (on coumadin) as destination therapy due to severe PAD, SIADH who presents with abd pain and dark stools, found to have a supratherapeutic INR and anemia.     Impression:  # Anemia - patient reports black stool however formed and hard not consistent with bleed; minimal dark brown stool on exam; hgb low compared to baseline (baseline 7-8, presented w/ 4.5). On previous admissions (1/2021) Hematology reviewed smear consistent with LVAD changes. Will evaluate w/ capsule endoscopy to attempt to localize bleeding source, and proceed with EGD +/- enteroscopy pending capsule endoscopy results  # LVAD on AC - supratherapeutic INR    Recommendation:  - trend CBCq12 hours and transfuse for goal >8  - infectious workup for leukocytosis  - correct INR  - workup of RASHAWN per primary team  - capsule endoscopy swallowed this evening (see notes below)  - clear liquid diet at 7:30pm, NPO at MN for possible EGD +/- enteroscopy  - rest of care per primary team

## 2021-06-14 NOTE — ED PROVIDER NOTE - CLINICAL SUMMARY MEDICAL DECISION MAKING FREE TEXT BOX
Gt Rivera MD, FACEP: In this physician's medical judgement based on clinical history and physical exam, patient with signs and symptoms of upper GIB in the setting of Coumadin use in an LVAD patient. Will get iv, cbc, cmp, t&s, pt/inr, vbg, cxr, consult LVAD team and likely admit  patient noted to be anemic and will transfuse  INR supratherapeutic and will give iv vit K and admit  Patient endorsed to the CTCU team at the time of admission. Based on patient's history and physical exam, as well as the results of today's workup, I feel that patient warrants admission to the hospital for further workup/evaluation and continued management. I discussed the findings of today's workup with the patient and addressed the patient's questions and concerns. The patient was agreeable with admission. Our team spoke with the inpatient receiving team who accepted the patient for admission and subsequently took over the patient's care at the time of admission. The receiving team will follow up on pending labs, analgesia, any clinical imaging results, ancillary findings, reassess, and disposition as clinically indicated. Details of patient and plan conveyed to receiving physician team and conveyed back for understanding. There were no questions at this time about the patient's status, disposition, and plan. Patient's care to be taken over by receiving physician team at this time, all decisions regarding the progression of care will be made at their discretion.

## 2021-06-14 NOTE — ED ADULT NURSE NOTE - OBJECTIVE STATEMENT
66 year old male pt presented to the ED co abd pain epigastric pain since friday, pt states dec PO with nausea and 2 episodes of vomiting this am, pt ambulates with a cane pt is A&OX3, abd soft tender to mid abd no distention noted lung field cta 66 year old male pt presented to the ED co abd pain epigastric pain since friday with dark stools and increase weakness, pt states dec PO with nausea and 2 episodes of vomiting this am, pt ambulates with a cane pt is A&OX3, abd soft tender to mid abd no distention noted lung field cta

## 2021-06-14 NOTE — ED PROVIDER NOTE - NS ED ROS FT
fatigue  weakness  shortness of breath   ROS as per HPI, attending statement, or otherwise negative.

## 2021-06-14 NOTE — H&P ADULT - ASSESSMENT
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 coli UTIs, driveline drainage (1/7/21) and COVID-19 (back in April 2020) presenting w/ abd pain and dark stools x 3 days. Pt now in the CTICU for further management of anemia.      Plan:  Neuro: Pain control as needed. Tylenol PRN   Resp: on Room Air. follow up AM CXR  CV: HM2 (9/17) with recent driveline infection (1/2021). On Home lopressor, amio, lisinopril, aldactone and Coumadin pts ASA was increased to 162 for elevated LDH (500s). Now presents with H/H drop. RPM 8800, flow 4.6 PI 6.7. LCAD team following   Endo: follow up A1c.  GI: capsule study placed at 5pm tonight, can have clears at 7pm. NPO @ [facility] for possible endoscopy tomorrow depending on what capsule study shows. Pt hx of melena in the past with transfusions with recent Endoscopy/colonscope and capsule back in july 2020   ID: Monitor WBC and temp   Heme: H/H 4.6/16, INR 8.8    65M PMH ACC/AHA stage D HF due to NICM HM2 LVAD , TV annuloplasty ring 9/12/17 as destination therapy due to severe peripheral artery disease with significant stenosis  SIADH, Depression, CKD-3 with hyperkalemia, past E. coli UTIs, driveline drainage (1/7/21) and COVID-19 (back in April 2020) presenting w/ abd pain and dark stools x 3 days. Pt now in the CTICU for further management of anemia.      Plan:  Neuro: Pain control as needed. Tylenol PRN   Resp: on Room Air. follow up AM CXR  CV: HM2 (9/17) with recent driveline infection (1/2021). On Home lopressor, amio, lisinopril, aldactone and Coumadin pts ASA was increased to 162 for elevated LDH (500s). Now presents with H/H drop. RPM 8800, flow 4.6 PI 6.7. LCAD team following   Endo: follow up A1c.  GI: capsule study placed at 5pm tonight, can have clears at 7pm. NPO @ MN for possible endoscopy tomorrow depending on what capsule study shows. Pt hx of melena in the past with transfusions with recent Endoscopy/colonscope and capsule back in july 2020   ID: Monitor WBC and temp   Heme: H/H 4.6/16, INR 8.8. s/p 2prbc, 1 ffp, vit K. h/h now 6.4/20.3. Per GI transfuse for goal hgb of >8. No signs of active bleeding. follow am LDH and hapto      Above plan discussed with Dr. Thompson and LVAD team

## 2021-06-14 NOTE — ED PROVIDER NOTE - CARE PLAN
Principal Discharge DX:	Anemia  Secondary Diagnosis:	Supratherapeutic INR  Secondary Diagnosis:	Dark stools

## 2021-06-14 NOTE — H&P ADULT - NSHPLABSRESULTS_GEN_ALL_CORE
.  LABS:                         4.5    16.45 )-----------( 256      ( 14 Jun 2021 12:25 )             16.3     06-14    135  |  98  |  45<H>  ----------------------------<  181<H>  4.6   |  18<L>  |  1.66<H>    Ca    8.9      14 Jun 2021 12:25  Mg     2.0     06-14    TPro  7.3  /  Alb  4.1  /  TBili  0.2  /  DBili  x   /  AST  18  /  ALT  7<L>  /  AlkPhos  62  06-14    PT/INR - ( 14 Jun 2021 12:38 )   PT: 95.6 sec;   INR: 8.84 ratio         PTT - ( 14 Jun 2021 12:38 )  PTT:38.8 sec          RADIOLOGY, EKG & ADDITIONAL TESTS: Reviewed.

## 2021-06-14 NOTE — CONSULT NOTE ADULT - ASSESSMENT
64 YO M with a history of stage D NICM s/p HM2 on 9/2017 as DT (due to severe PAD) with TV ring, prior COVID-19 infection 4/2020, recurrent syncope post LVAD s/p ILR, and chronic abdominal pain with prior negative workup.  This am patient called with feeling poorly and with c/o lightheadedness and dizziness. He was brought to the ER where it was noted that his H/H was 4.5/16.3. Admitted to CTU for blood transfusion, Vitamin K and FFP  Will need GI consult to identify source of bleeding  Of Note: INR is >8.0

## 2021-06-14 NOTE — ED PROVIDER NOTE - PROGRESS NOTE DETAILS
LVAD team called and presented to bedside requesting admission to CTCU, LDH, and haptoglobin added  will admit to Dr. Cadet's service.  INR noted to be ~8 will give vit K will admit to CTCU

## 2021-06-14 NOTE — PROCEDURE NOTE - NSPOSTPRCRAD_GEN_A_CORE
central line located in the superior vena cava/depth of insertion/line adjusted to depth of insertion/no pneumothorax/post-procedure radiography performed

## 2021-06-14 NOTE — PROGRESS NOTE ADULT - SUBJECTIVE AND OBJECTIVE BOX
CRITICAL CARE ATTENDING - CTICU    MEDICATIONS  (STANDING):                                    4.5    16.45 )-----------( 256      ( 14 Jun 2021 12:25 )             16.3       06-14    135  |  98  |  45<H>  ----------------------------<  181<H>  4.6   |  18<L>  |  1.66<H>    Ca    8.9      14 Jun 2021 12:25  Mg     2.0     06-14    TPro  7.3  /  Alb  4.1  /  TBili  0.2  /  DBili  x   /  AST  18  /  ALT  7<L>  /  AlkPhos  62  06-14      PT/INR - ( 14 Jun 2021 12:38 )   PT: 95.6 sec;   INR: 8.84 ratio         PTT - ( 14 Jun 2021 12:38 )  PTT:38.8 sec        Daily Height in cm: 177.8 (14 Jun 2021 11:44)    Daily         Critically Ill patient  : [ ] preoperative ,   [ x] post operative    Requires :  [x] Arterial Line   [ ] Central Line  [ ] PA catheter  [ ] IABP  [ ] ECMO  [x ] LVAD  [ ] Ventilator  [ ] pacemaker [ ] Impella.                      [x ] ABG's     [ x] Pulse Oxymetry Monitoring  Bedside evaluation , monitoring , treatment of hemodynamics , fluids , IVP/ IVCD meds.        Diagnosis:     GI Bleeding    Hypotension    Hypovolemia    Hemodynamic lability,  instability. Requires IVCD [ ] vasopressors [ ] inotropes  [ ] vasodilator  [x ]IVSS fluid / Blood / Products   to maintain MAP, perfusion, C.I.     LVAD patient    Melena     anticoagulation with Coumadin  - coagulopathy    Transfuse 2 units PRBC    Vitamin K     FFP    GI Consultation - Upper Endoscopy ?    Renal Failure - Acute Kidney Injury     Metabolic Acidosis                        Discussed with CT surgeon, Physician's Assistant - Nurse Practitioner- Critical care medicine team.   Discussed at  AM / PM rounds.   Chart, labs , films reviewed.    Cumulative Critical Care Time Given Today : 40 min

## 2021-06-14 NOTE — CONSULT NOTE ADULT - SUBJECTIVE AND OBJECTIVE BOX
HPI:  65M PMH ACC/AHA stage D HF due to NICM HM2 LVAD , TV annuloplasty ring 9/12/17 as destination therapy due to severe peripheral artery disease with significant stenosis  SIADH, Depression, CKD-3 with hyperkalemia, past E. coli UTIs, driveline drainage (1/7/21) and COVID-19 (back in April 2020) presenting w/ abd pain and dark stools x 3 days.     At home patient on coumadin for LVAD, no other antiplatelet/anticoagulation. No NSAID use. States for past three days having epigastric abd pain, no nause/vomiting, + dark stools, approximately 2-3 per day. Today called clinic for ongoing symptoms so was referred to ED.     Patient has required transfusions for GIB in the past. Mostly recently back in jan 2021 pt had anemia with dark stools. No interventions was done at that time. However Last Endoscopy/colonoscopy/capsule endoscopy was done in July 2020 (negative).     Today labs show patient is anemic with H/H of 4.5/16.3,. INR is 8.84. Transfused 2u pRBC, 1u FFP.       Allergies:  No Known Allergies      Home Medications:    Hospital Medications:  pantoprazole  Injectable 40 milliGRAM(s) IV Push every 12 hours  sodium chloride 0.9% lock flush 3 milliLiter(s) IV Push every 8 hours      PMHX/PSHX:  No pertinent past medical history    CHF (congestive heart failure)    CAD (coronary artery disease)    Depression    Pleural effusion    History of 2019 novel coronavirus disease (COVID-19)    Hemorrhoids    Bleeding hemorrhoids    Peripheral arterial disease    Claudication    BPH with urinary obstruction    ACC/AHA stage D systolic heart failure    Anticoagulation goal of INR 2.0 to 2.5    Falls    Clavicle fracture    CKD (chronic kidney disease), stage III    Iron deficiency anemia    H/O epistaxis    Vertigo    GI bleed    No significant past surgical history    S/P TVR (tricuspid valve repair)    S/P ventricular assist device    S/P endoscopy        Family history:  No pertinent family history in first degree relatives        Denies family history of colon cancer/polyps, stomach cancer/polyps, pancreatic cancer/masses, liver cancer/disease, ovarian cancer and endometrial cancer.    Social History:   Tob: Denies  EtOH: Denies  Illicit Drugs: Denies    ROS:     General:  No wt loss, fevers, chills, night sweats, fatigue  Eyes:  Good vision, no reported pain  ENT:  No sore throat, pain, runny nose, dysphagia  CV:  No pain, palpitations, hypo/hypertension  Pulm:  No dyspnea, cough, tachypnea, wheezing  GI:  see HPI  :  No pain, bleeding, incontinence, nocturia  Muscle:  No pain, weakness  Neuro:  No weakness, tingling, memory problems  Psych:  No fatigue, insomnia, mood problems, depression  Endocrine:  No polyuria, polydipsia, cold/heat intolerance  Heme:  No petechiae, ecchymosis, easy bruisability  Skin:  No rash, tattoos, scars, edema    PHYSICAL EXAM:     GENERAL:  No acute distress  HEENT:  NCAT, no scleral icterus   CHEST:  no respiratory distress  HEART:  Regular rate and rhythm  ABDOMEN:  Soft, non-tender, non-distended, normoactive bowel sounds,  no masses, no hepato-splenomegaly, no signs of chronic liver disease  RECTAL: dark stool  EXTREMITIES: No edema  SKIN:  No rash/erythema/ecchymoses/petechiae/wounds/abscess/warm/dry  NEURO:  Alert and oriented x 3, no asterixis    Vital Signs:  Vital Signs Last 24 Hrs  T(C): 36.8 (14 Jun 2021 16:00), Max: 36.8 (14 Jun 2021 16:00)  T(F): 98.2 (14 Jun 2021 16:00), Max: 98.2 (14 Jun 2021 16:00)  HR: 84 (14 Jun 2021 17:00) (83 - 104)  BP: 91/- (14 Jun 2021 12:14) (65/59 - 91/-)  BP(mean): 86 (14 Jun 2021 16:00) (80 - 90)  RR: 29 (14 Jun 2021 17:00) (16 - 36)  SpO2: 100% (14 Jun 2021 17:00) (97% - 100%)  Daily Height in cm: 177.8 (14 Jun 2021 11:44)    Daily     LABS:                        4.5    16.45 )-----------( 256      ( 14 Jun 2021 12:25 )             16.3     Mean Cell Volume: 76.9 fl (06-14-21 @ 12:25)    06-14    135  |  98  |  45<H>  ----------------------------<  181<H>  4.6   |  18<L>  |  1.66<H>    Ca    8.9      14 Jun 2021 12:25  Mg     2.0     06-14    TPro  7.3  /  Alb  4.1  /  TBili  0.2  /  DBili  x   /  AST  18  /  ALT  7<L>  /  AlkPhos  62  06-14    LIVER FUNCTIONS - ( 14 Jun 2021 12:25 )  Alb: 4.1 g/dL / Pro: 7.3 g/dL / ALK PHOS: 62 U/L / ALT: 7 U/L / AST: 18 U/L / GGT: x           PT/INR - ( 14 Jun 2021 12:38 )   PT: 95.6 sec;   INR: 8.84 ratio         PTT - ( 14 Jun 2021 12:38 )  PTT:38.8 sec    Amylase Serum--      Lipase serum19       Ammonia--                          4.5    16.45 )-----------( 256      ( 14 Jun 2021 12:25 )             16.3         CAPSULE ENDOSCOPY PROCEDURE:  Risks of video capsule endoscopy explained, including risk of retained capsule, potentially requiring surgery for removal.  Patient understands and agrees to risks.  Capsule ID: [id number]  Capsule swallowed at:5:00pm  NPO now.  Resume Clear liquid diet at:  7:00pm  Equipment can be removed by nursing staff at: 5:00am  GI fellow will retrieve equipment in the morning.    NO MRI UNTIL CAPSULE CLEARANCE CONFIRMED. CAPSULE IS NOT MRI COMPATIBLE.         Imaging:  EGD 7/13/20  Findings:       The examined esophagus was normal.       The Z-line was regular and was found 38 cm from the incisors.       A 2 cm hiatus hernia was present.       The entire examined stomach was normal.       The examined duodenumwas normal.                                                                                                        Impression:          - Normal esophagus.                       - Z-line regular, 38 cm from the incisors.                       - 2 cm hiatus hernia.                       - Normal stomach.                       - Normal examined duodenum.                       - No specimens collected.    Colonoscopy 7/13/20  Findings:       A large amount of dark green liquid and solid stool was found in the entire colon interfering        with visualization.       The colon was otherwise without abnormality.       Liquid brown stool was found in the terminal ileum.       Non-bleeding internal hemorrhoids were found during retroflexion. The hemorrhoids were small.                                                                                                        Impression:          - Stool in the entire examined colon and terminal ileum.                       - No active bleeding or source of bleeding identified in the colon.                       - Non-bleeding internal hemorrhoids.                       - No specimens collected.      Capsule Endoscopy 7/14/21  Findings:       Images of the esophagus, stomach and small bowel were obtained from the swallowed capsule and        reviewed.       No significant pathology seen in the examined small bowel. No signs of bleeding seen in the        small intestine.                                                     Impression:          - Normal video capsule endoscopy.

## 2021-06-14 NOTE — H&P ADULT - NSHPPHYSICALEXAM_GEN_ALL_CORE
VITALS:   T(C): 36.8 (06-14-21 @ 16:00), Max: 36.8 (06-14-21 @ 16:00)  HR: 84 (06-14-21 @ 17:00) (83 - 104)  BP: 91/- (06-14-21 @ 12:14) (65/59 - 91/-)  RR: 29 (06-14-21 @ 17:00) (16 - 36)  SpO2: 100% (06-14-21 @ 17:00) (97% - 100%)    GENERAL: NAD, lying in bed comfortably  HEAD:  Atraumatic, normocephalic  EYES: EOMI, PERRLA, conjunctiva and sclera clear  ENT: Dry mucous membranes  NECK: Supple, no JVD  HEART: Regular rate and rhythm, HM2 in place   LUNGS: Unlabored respirations.  Clear to auscultation bilaterally, no crackles, wheezing, or rhonchi  ABDOMEN: Soft, nontender, nondistended, +BS  EXTREMITIES: No clubbing, cyanosis, or edema  NERVOUS SYSTEM:  A&Ox3, no focal deficits   SKIN: No rashes or lesions

## 2021-06-14 NOTE — H&P ADULT - HISTORY OF PRESENT ILLNESS
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 coli UTIs, driveline drainage (1/7/21) and COVID-19 (back in April 2020)  He was recently seen in clinic where he complained of abdominal pain and dark stools w constipation back in May. He presents to [facility] ER today weakness and fatigue, moderate and + Black stools for three days, on coumadin secondary to warfarin use in the setting of an LVAD. Patient has required transfusions for GIB in the past. Mostly recently back in jan 2021 pt had anemia with dark stools. No interventions was done at that time. However Last Endoscopy was done in July 2020 (negative). Today labs show patient is anemic with H/H of 4.5/16.3,. INR is 8.84 MAP in the 90s, Temp 35.1. He denies any chest pain, shortness of breath, dizziness, abd pain, nausea or vomiting.

## 2021-06-14 NOTE — ED PROVIDER NOTE - OBJECTIVE STATEMENT
Patient with chief complaint of weakness and fatigue, moderate, persistent symptoms. + Black stools for three days, on coumadin secondary to warfarin use in the setting of an LVAD. Patient has required transfusions for GIB in the past. Not better with time.

## 2021-06-14 NOTE — CONSULT NOTE ADULT - ATTENDING COMMENTS
Mr. Quispe is a 64 YO M s/p HM2 2017 as DT who is presenting with severe symptomatic anemia with elevated INR. He is hemodynamically stable without pressor requirements. Will transfuse and consult GI for repeat endoscopic workup (previously negative). He was given vitamin K instead of FFP to reverse INR, will have low threshold to start heparin once hemoglobin has stabilized as he has a HM2 with LDH's that had been slowly rising.

## 2021-06-14 NOTE — ED PROVIDER NOTE - PHYSICAL EXAMINATION
GEN: NAD, awake, eyes open spontaneously  EYES: pale palpebral conjunctiva, perrl  ENT: NCAT, pale and moist MM, Trachea midline  CHEST/LUNGS: Non-tachypneic, CTAB, bilateral breath sounds  CARDIAC: Non-tachycardic, normal perfusion, LVAD thrill/hum auscultated  ABDOMEN: Soft, NTND, No rebound/guarding  MSK: No edema, no gross deformity of extremities  SKIN: No rashes, no petechiae, no vesicles  NEURO: CN grossly intact, normal coordination, no focal motor or sensory deficits  PSYCH: Alert, appropriate, cooperative, with capacity and insight

## 2021-06-14 NOTE — CONSULT NOTE ADULT - SUBJECTIVE AND OBJECTIVE BOX
Patient Examined lying in bed  "I'm not feeling well"    aMIOdarone    Tablet 200 milliGRAM(s) Oral daily  cyanocobalamin 1000 MICROGram(s) Oral daily  finasteride 5 milliGRAM(s) Oral daily  folic acid 1 milliGRAM(s) Oral daily  gabapentin 100 milliGRAM(s) Oral at bedtime  influenza   Vaccine 0.5 milliLiter(s) IntraMuscular once  lidocaine   Patch 1 Patch Transdermal daily  metoprolol succinate ER 25 milliGRAM(s) Oral daily  mirtazapine 7.5 milliGRAM(s) Oral at bedtime  pantoprazole    Tablet 40 milliGRAM(s) Oral every 12 hours  polyethylene glycol 3350 17 Gram(s) Oral two times a day  senna 2 Tablet(s) Oral at bedtime  simethicone 80 milliGRAM(s) Chew three times a day  sodium chloride 0.9% lock flush 3 milliLiter(s) IV Push every 8 hours  spironolactone 25 milliGRAM(s) Oral daily  Warfarin 4 mg every night  Aspirin 162 mg daily      Vital Signs Last 24 Hrs  T(C): 35.1 (14 Jun 2021 14:25), Max: 36.5 (14 Jun 2021 11:44)  T(F): 95.2 (14 Jun 2021 14:25), Max: 97.7 (14 Jun 2021 11:44)  HR: 104 (14 Jun 2021 14:25) (83 - 104)  BP: 91/- (14 Jun 2021 12:14) (65/59 - 91/-)  BP(mean): 90 (14 Jun 2021 15:00) (80 - 90)  RR: 28 (14 Jun 2021 14:25) (16 - 28)  SpO2: 98% (14 Jun 2021 14:25) (97% - 98%)    I&O's Summary    14 Jun 2021 07:01  -  14 Jun 2021 15:43  --------------------------------------------------------  IN: 50 mL / OUT: 0 mL / NET: 50 mL    Daily Height in cm: 177.8 (14 Jun 2021 11:44)                      4.5    16.45 )-----------( 256      ( 14 Jun 2021 12:25 )             16.3   06-14    135  |  98  |  45<H>  ----------------------------<  181<H>  4.6   |  18<L>  |  1.66<H>    Ca    8.9      14 Jun 2021 12:25  Mg     2.0     06-14    TPro  7.3  /  Alb  4.1  /  TBili  0.2  /  DBili  x   /  AST  18  /  ALT  7<L>  /  AlkPhos  62  06-14    PT/INR - ( 14 Jun 2021 12:38 )   PT: 95.6 sec;   INR: 8.84 ratio     PTT - ( 14 Jun 2021 12:38 )  PTT:38.8 sec  LDH: 443    PHYSICAL EXAM:      Constitutional: Comfortable, no distress, pale looking  Neck: NO JVD  Respiratory: lungs clear  Cardiovascular: +hum of LVAD  Gastrointestinal: soft, NT, ND   Extremities: No C/C/E noted  Neurological: A&O X 3  Skin: No rashed  Psychiatric: normal mood and affect    LVAD Interrogation  VAD TYPE HM 2  Speed 8800  Flow 4.6  Power 4.8- 4.9  PI 6.0-6.8  Assessment of driveline exit site: driveline dressing c/d/i  Programming changes: no changes made

## 2021-06-15 NOTE — CONSULT NOTE ADULT - ASSESSMENT
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 coli UTIs, driveline drainage (1/7/21) and COVID-19 (back in April 2020)  He was recently seen in clinic where he complained of abdominal pain and dark stools w constipation back in May. He presents to [facility] ER today weakness and fatigue, moderate and + Black stools for three days, on coumadin secondary to warfarin use in the setting of an LVAD. Patient has required transfusions for GIB in the past. Mostly recently back in jan 2021 pt had anemia with dark stools. No interventions was done at that time. However Last Endoscopy was done in July 2020 (negative). Today labs show patient is anemic with H/H of 4.5/16.3,. INR is 8.84 MAP in the 90s,   Returned from endoscopy appearing ill tachypneic with chills with new white out of his left lung    Sepsis:  Aspiration event vs esophageal tear at time of procedure  Blood cultures x 2 sets  Repeat CBC with diff, CMP, lactate  oxygenation monitor sat closely- may need to be intubated  Will start antibiotics with Zosyn 3.375gm IV q 8hr to cover for aspiration event    Morris Prater MD  [phone]  After 5pm/weekends [phone]   65M PMH ACC/AHA stage D HF due to NICM HM2 LVAD , TV annuloplasty ring 9/12/17 as destination therapy due to severe peripheral artery disease with significant stenosis  SIADH, Depression, CKD-3 with hyperkalemia, past E. coli UTIs, driveline drainage (1/7/21) and COVID-19 (back in April 2020)  He was recently seen in clinic where he complained of abdominal pain and dark stools w constipation back in May. He presents to [facility] ER today weakness and fatigue, moderate and + Black stools for three days, on coumadin secondary to warfarin use in the setting of an LVAD. Patient has required transfusions for GIB in the past. Mostly recently back in jan 2021 pt had anemia with dark stools. No interventions was done at that time. However Last Endoscopy was done in July 2020 (negative). Today labs show patient is anemic with H/H of 4.5/16.3,. INR is 8.84 MAP in the 90s,   Returned from endoscopy appearing ill tachypneic with chills with new white out of his left lung    Sepsis:  Aspiration event vs esophageal tear at time of procedure  Blood cultures x 2 sets  Repeat CBC with diff, CMP, lactate  oxygenation monitor sat closely- may need to be intubated  Will start antibiotics with Zosyn 3.375gm IV q 8hr to cover for aspiration event  Will need CT scan of chest/abd/pelvis once he stabilizes to see if perforation/tear    Morris Prater MD  [phone]  After 5pm/weekends [phone]

## 2021-06-15 NOTE — PROGRESS NOTE ADULT - ASSESSMENT
-- Message is from the [facility]--    Reason for Call: Patient is calling to say, the pharmacy is out of the medication, asking for the dr to send to another pharmacy,ADDERALL XR) 20 MG 24 hr capsule   [facility], [place]  Phone   [phone]       Caller Information       Type Contact Phone    05/06/2019 06:24 PM Phone (Incoming) Nathan Rosales (Self) [phone] (H)          Alternative phone number: N/A      Turnaround time given to caller:   \"This message will be sent to [state Provider's name]. The clinical team will fulfill your request as soon as they review your message when the office opens tomorrow.\"     66 YO M with a history of stage D NICM s/p HM2 on 9/2017 as DT (due to severe PAD) with TV ring, prior COVID-19 infection 4/2020, recurrent syncope post LVAD s/p ILR, and chronic abdominal pain with prior negative workup.  This am patient called with feeling poorly and with c/o lightheadedness and dizziness. He was brought to the ER where it was noted that his H/H was 4.5/16.3. Admitted to CTU for blood transfusion, Vitamin K and FFP  GI placed capsule found bleeding in small colon will do upper endoscopy today  Of Note: INR is >8.0 on admission, he recieved 4 PRBC and 2 FFP plus Vitamin K, INR today 1.27 Mart-1 - Negative Histology Text: MART-1 staining demonstrates a normal density and pattern of melanocytes along the dermal-epidermal junction. The surgical margins are negative for tumor cells.

## 2021-06-15 NOTE — PROGRESS NOTE ADULT - SUBJECTIVE AND OBJECTIVE BOX
Called to patient's bedside for intubation.  Therapy initiated by primary medical team.    Patient Assessment: Pt tachypneic, dyspneic, despite high flow O2 NC, likely aspiration pneumonia on CXR    Baseline Vital Signs:  BP: 157/54  HR: 110  RR: 40  SpO2: 97 on high flow O2 NC    Medications Administered:  20mg etomidate  100mg succinylcholine    Intubation:      [X] Direct Laryngoscopy    [ ] Video-Assisted Laryngoscopy   [ ] Fiberoptic Intubation    Blade: MAC 3  Cormack-Lehane View: [X] 1     [ ] 2A     [ ] 2B     [ ] 3     [ ]  4  ETT Size: 8.0  Marking at Teeth: 23cm    Post-Intubation Vital Signs:  BP: 170/90  HR: 112  RR: 18  SpO2: 99    Positive end-tidal carbon dioxide via EasyCap, positive bilateral breath sounds.  CXR to be reviewed by primary team.  Atraumatic intubation with no complications. Care transferred to primary team.

## 2021-06-15 NOTE — PROGRESS NOTE ADULT - SUBJECTIVE AND OBJECTIVE BOX
RICKY HAMPTON  MRN-[id number]  Patient is a 66y old  Male who presents with a chief complaint of Anemia, Supratherapeutic INR, Dark Stools (15 Jamil 2021 18:28)    HPI:  65M PMH ACC/AHA stage D HF due to NICM HM2 LVAD , TV annuloplasty ring 17 as destination therapy due to severe peripheral artery disease with significant stenosis  SIADH, Depression, CKD-3 with hyperkalemia, past E. coli UTIs, driveline drainage (21) and COVID-19 (back in 2020)  He was recently seen in clinic where he complained of abdominal pain and dark stools w constipation back in May. He presents to [facility] ER today weakness and fatigue, moderate and + Black stools for three days, on coumadin secondary to warfarin use in the setting of an LVAD. Patient has required transfusions for GIB in the past. Mostly recently back in 2021 pt had anemia with dark stools. No interventions was done at that time. However Last Endoscopy was done in 2020 (negative). Today labs show patient is anemic with H/H of 4.5/16.3,. INR is 8.84 MAP in the 90s, Temp 35.1. He denies any chest pain, shortness of breath, dizziness, abd pain, nausea or vomiting.     (2021 16:57)      Surgery/Hospital course:   admit for melena w/ anemia, INR 8.84     Today:  INR now downtrending to 1.27->1.17, will continue to monitor closely. Abdominal XR today: nonobstructive bowel gas pattern and large stool burden.  S/p capsule study notable for bleed in small bowel s/p balloon endoscopy today, notable for old blood in proximal ileum, no acute intervention indicated at this time. Upon return to CTU patient tachypneic with persistent cough, CXR suspicious for aspiration, was intubated for respiratory support in setting of concern for sepsis. +Rigors with temp up to 101.0F, started Zosyn and BCx2 sent, will f/u. Echo this evening with severe global LV systolic dysfunction with HM2 in place, decreased RV systolic function, interventricular septums bows slightly to right, otherwise grossly similar to prior.     REVIEW OF SYSTEMS:  Unable to obtain, pt intubated and sedated     Physical Exam:  Vital Signs Last 24 Hrs  T(C): 36.4 (15 Jamil 2021 16:00), Max: 36.6 (15 Jamil 2021 10:54)  T(F): 97.6 (15 Jamil 2021 16:00), Max: 97.8 (15 Jaiml 2021 10:54)  HR: 125 (15 Jamil 2021 18:45) (71 - 143)  BP: --  BP(mean): 90 (2021 21:00) (90 - 90)  RR: 19 (15 Jamil 2021 18:45) (15 - 59)  SpO2: 96% (15 Jamil 2021 18:45) (87% - 100%)  Gen:  intubated   CNS: sedated 	  Neck: no JVD  RES : clear , no wheezing    CVS: +VAD sounds   Abd: Soft, non-distended. Bowel sounds present.  Skin: No rash.  Ext:  no edema, A Line  ============================I/O===========================   I&O's Detail    2021 07:01  -  15 Jamil 2021 07:00  --------------------------------------------------------  IN:    IV PiggyBack: 400 mL    Plasma: 900 mL    PRBCs (Packed Red Blood Cells): 1200 mL  Total IN: 2500 mL    OUT:    Voided (mL): 950 mL  Total OUT: 950 mL    Total NET: 1550 mL      15 Jamil 2021 07:01  -  15 Jamil 2021 19:39  --------------------------------------------------------  IN:    Oral Fluid: 360 mL    PRBCs (Packed Red Blood Cells): 300 mL  Total IN: 660 mL    OUT:    Voided (mL): 500 mL  Total OUT: 500 mL    Total NET: 160 mL        ============================ LABS =========================                        10.4   4.43  )-----------( 119      ( 15 Jamil 2021 19:10 )             33.6     06-15    133<L>  |  101  |  36<H>  ----------------------------<  103<H>  3.7   |  20<L>  |  1.23    Ca    8.7      15 Jamil 2021 00:17  Phos  2.2     -15  Mg     2.0     -15    TPro  6.5  /  Alb  3.6  /  TBili  0.6  /  DBili  x   /  AST  18  /  ALT  8<L>  /  AlkPhos  55  -15    LIVER FUNCTIONS - ( 15 Jamil 2021 00:17 )  Alb: 3.6 g/dL / Pro: 6.5 g/dL / ALK PHOS: 55 U/L / ALT: 8 U/L / AST: 18 U/L / GGT: x           PT/INR - ( 15 Jamil 2021 19:10 )   PT: 13.9 sec;   INR: 1.17 ratio         PTT - ( 15 Jamil 2021 19:10 )  PTT:20.4 sec  ABG - ( 15 Jamil 2021 18:49 )  pH, Arterial: 7.26  pH, Blood: x     /  pCO2: 51    /  pO2: 103   / HCO3: 22    / Base Excess: -4.5  /  SaO2: 97          Urinalysis Basic - ( 15 Jamil 2021 18:54 )    Color: Light Yellow / Appearance: Clear / S.018 / pH: x  Gluc: x / Ketone: Small  / Bili: Negative / Urobili: Negative   Blood: x / Protein: Trace / Nitrite: Negative   Leuk Esterase: Negative / RBC: 1 /hpf / WBC 0 /HPF   Sq Epi: x / Non Sq Epi: 1 /hpf / Bacteria: Negative      ======================Micro/Rad/Cardio=================  Culture: Reviewed   CXR: Reviewed  Echo:Reviewed  ======================================================  PAST MEDICAL & SURGICAL HISTORY:  CHF (congestive heart failure)    CAD (coronary artery disease)    Depression    Pleural effusion    History of 2019 novel coronavirus disease (COVID-19)  2020    Hemorrhoids    Bleeding hemorrhoids    Peripheral arterial disease    Claudication    BPH with urinary obstruction    ACC/AHA stage D systolic heart failure    Anticoagulation goal of INR 2.0 to 2.5    Falls    Clavicle fracture    CKD (chronic kidney disease), stage III    Iron deficiency anemia    H/O epistaxis    Vertigo    GI bleed    S/P TVR (tricuspid valve repair)    S/P ventricular assist device    S/P endoscopy      ====================ASSESSMENT ==============  Stage D NICM, s/p HM2 on 2017   Recent driveline infection on 2021   Anemia, in setting of melena   Supratherapeutic INR   Stress hyperglycemia   Hemodynamic instability   Hypertension     Plan:  ====================== NEUROLOGY=====================  Sedated with IV Propofol to maintain vent synchrony   Tylenol for analgesia    acetaminophen  IVPB .. 1000 milliGRAM(s) IV Intermittent once  propofol Infusion 20 MICROgram(s)/kG/Min (9.42 mL/Hr) IV Continuous <Continuous>    ==================== RESPIRATORY======================  S/p endoscopy, patient tachypneic with persistent cough, CXR suspicious for aspiration, was intubated for respiratory support in setting of concern for sepsis   Respiratory status required full ventilatory support, close monitoring of respiratory rate and breathing pattern, the following of ABG’s with A-line monitoring, continuous pulse oximetry monitoring    Mechanical Ventilation:  Mode: AC/ CMV (Assist Control/ Continuous Mandatory Ventilation)  RR (machine): 12  TV (machine): 500  FiO2: 100  PEEP: 5  ITime: 1  MAP: 11  PIP: 19      ====================CARDIOVASCULAR==================  Stage D NICM, s/p HM2 on 2017; recent driveline infection on 2021   Admitted for anemia with supratherapeutic INR 8.84  Echo 6/15: severe global LV systolic dysfunction with HM2 in place, decreased RV systolic function, interventricular septums bows slightly to right, otherwise grossly similar to prior   Continue invasive hemodynamic monitoring   Blood pressure support with IV Cardene   Rate control with Amiodarone     aMIOdarone    Tablet 200 milliGRAM(s) Oral daily  niCARdipine Infusion 5 mG/Hr (25 mL/Hr) IV Continuous <Continuous>    ===================HEMATOLOGIC/ONC ===================  Anemia in setting of melena and supratherapeutic INR of 8.84 on admission   INR now downtrending to 1.27->1.17, will continue to monitor closely.   Monitor H&H/Plts     ===================== RENAL =========================  Continue monitoring urine output, I&Os, BUN/Cr   Monitor and replete lytes prn     ==================== GASTROINTESTINAL===================  Abdominal XR today: nonobstructive bowel gas pattern and large stool burden.  S/p capsule study notable for bleed in small bowel s/p balloon endoscopy today, notable for old blood in proximal ileum, no acute intervention indicated   NPO post procedure, will advance diet when appropriate     GI prophylaxis, pantoprazole  Injectable 40 milliGRAM(s) IV Push every 12 hours  sodium chloride 0.9%. 1000 milliLiter(s) (10 mL/Hr) IV Continuous <Continuous>    =======================    ENDOCRINE  =====================  Continue glucose control with admelog sliding scale for stress hyperglycemia    insulin lispro (ADMELOG) corrective regimen sliding scale   SubCutaneous every 6 hours    ========================INFECTIOUS DISEASE================  Concern for sepsis +rigors with temp up to 101.0F, started Zosyn and BCx2 sent, will f/u    piperacillin/tazobactam IVPB.. 3.375 Gram(s) IV Intermittent every 8 hours        Patient requires continuous monitoring with:  bedside rhythm monitoring, arterial line, pulse oximetry, ventilator management and monitoring; intermittent blood gas analysis.  Care plan discussed with ICU care team.  patient remain critical; required more than usual post op care; I have spent 35 minutes providing non routine post op care, revaluated multiple times during the day .     By signing my name below, I, Agnieszka Cherry, attest that this documentation has been prepared under the direction and in the presence of Simon Santo MD.  Electronically signed: Romel Shaffer, 06-15-21 @ 19:39    I, Simon Santo, personally performed the services described in this documentation. all medical record entries made by the romel were at my direction and in my presence. I have reviewed the chart and agree that the record reflects my personal performance and is accurate and complete  Electronically signed: Simon Santo, 06-15-21 @ 19:39       RICKY HAMPTON  MRN-[id number]  Patient is a 66y old  Male who presents with a chief complaint of Anemia, Supratherapeutic INR, Dark Stools (15 Jamil 2021 18:28)    HPI:  65M PMH ACC/AHA stage D HF due to NICM HM2 LVAD , TV annuloplasty ring 17 as destination therapy due to severe peripheral artery disease with significant stenosis  SIADH, Depression, CKD-3 with hyperkalemia, past E. coli UTIs, driveline drainage (21) and COVID-19 (back in 2020)  He was recently seen in clinic where he complained of abdominal pain and dark stools w constipation back in May. He presents to [facility] ER today weakness and fatigue, moderate and + Black stools for three days, on coumadin secondary to warfarin use in the setting of an LVAD. Patient has required transfusions for GIB in the past. Mostly recently back in 2021 pt had anemia with dark stools. No interventions was done at that time. However Last Endoscopy was done in 2020 (negative). Today labs show patient is anemic with H/H of 4.5/16.3,. INR is 8.84 MAP in the 90s, Temp 35.1. He denies any chest pain, shortness of breath, dizziness, abd pain, nausea or vomiting.     (2021 16:57)      Surgery/Hospital course:   admit for melena w/ anemia, INR 8.84     Today:  INR now downtrending to 1.27->1.17, will continue to monitor closely. Abdominal XR today: nonobstructive bowel gas pattern and large stool burden.  S/p capsule study notable for bleed in small bowel s/p balloon endoscopy today, notable for old blood in proximal ileum, no acute intervention indicated at this time. Upon return to CTU patient tachypneic with persistent cough, CXR suspicious for aspiration, was intubated for respiratory support in setting of concern for sepsis. +Rigors with temp up to 101.0F, started Zosyn and BCx2 sent, will f/u.   Echo this evening with severe global LV systolic dysfunction with HM2 in place, decreased RV systolic function, interventricular septums bows slightly to right, otherwise grossly similar to prior.   pt remain tachypnic, on high fio2, required intubatin, called anaesthesia, pt was intubated full vent support;  low flow alarm, low cvp, given fluids and started on inotropes and pressors.    REVIEW OF SYSTEMS:  fever , chills, sob, nausea      Physical Exam:  Vital Signs Last 24 Hrs  T(C): 36.4 (15 Jamil 2021 16:00), Max: 36.6 (15 Jamil 2021 10:54)  T(F): 97.6 (15 Jamil 2021 16:00), Max: 97.8 (15 Jamil 2021 10:54)  HR: 125 (15 Jamil 2021 18:45) (71 - 143)  BP: --  BP(mean): 90 (2021 21:00) (90 - 90)  RR: 19 (15 Jamil 2021 18:45) (15 - 59)  SpO2: 96% (15 Jamil 2021 18:45) (87% - 100%)  Gen:  intubated   CNS: sedated 	  Neck: no JVD  RES : clear , no wheezing    CVS: +VAD sounds   Abd: Soft, non-distended. Bowel sounds present.  Skin: No rash.  Ext:  no edema, A Line  ============================I/O===========================   I&O's Detail    2021 07:01  -  15 Jamil 2021 07:00  --------------------------------------------------------  IN:    IV PiggyBack: 400 mL    Plasma: 900 mL    PRBCs (Packed Red Blood Cells): 1200 mL  Total IN: 2500 mL    OUT:    Voided (mL): 950 mL  Total OUT: 950 mL    Total NET: 1550 mL      15 Jamil 2021 07:01  -  15 Jamil 2021 19:39  --------------------------------------------------------  IN:    Oral Fluid: 360 mL    PRBCs (Packed Red Blood Cells): 300 mL  Total IN: 660 mL    OUT:    Voided (mL): 500 mL  Total OUT: 500 mL    Total NET: 160 mL        ============================ LABS =========================                        10.4   4.43  )-----------( 119      ( 15 Jamil 2021 19:10 )             33.6     06-15    133<L>  |  101  |  36<H>  ----------------------------<  103<H>  3.7   |  20<L>  |  1.23    Ca    8.7      15 Jamil 2021 00:17  Phos  2.2     06-15  Mg     2.0     06-15    TPro  6.5  /  Alb  3.6  /  TBili  0.6  /  DBili  x   /  AST  18  /  ALT  8<L>  /  AlkPhos  55  06-15    LIVER FUNCTIONS - ( 15 Jamil 2021 00:17 )  Alb: 3.6 g/dL / Pro: 6.5 g/dL / ALK PHOS: 55 U/L / ALT: 8 U/L / AST: 18 U/L / GGT: x           PT/INR - ( 15 Jamil 2021 19:10 )   PT: 13.9 sec;   INR: 1.17 ratio         PTT - ( 15 Jamil 2021 19:10 )  PTT:20.4 sec  ABG - ( 15 Jamil 2021 18:49 )  pH, Arterial: 7.26  pH, Blood: x     /  pCO2: 51    /  pO2: 103   / HCO3: 22    / Base Excess: -4.5  /  SaO2: 97          Urinalysis Basic - ( 15 Jamil 2021 18:54 )    Color: Light Yellow / Appearance: Clear / S.018 / pH: x  Gluc: x / Ketone: Small  / Bili: Negative / Urobili: Negative   Blood: x / Protein: Trace / Nitrite: Negative   Leuk Esterase: Negative / RBC: 1 /hpf / WBC 0 /HPF   Sq Epi: x / Non Sq Epi: 1 /hpf / Bacteria: Negative      ======================Micro/Rad/Cardio=================  Culture: Reviewed   CXR: Reviewed  Echo:Reviewed  ======================================================  PAST MEDICAL & SURGICAL HISTORY:  CHF (congestive heart failure)    CAD (coronary artery disease)    Depression    Pleural effusion    History of 2019 novel coronavirus disease (COVID-19)  2020    Hemorrhoids    Bleeding hemorrhoids    Peripheral arterial disease    Claudication    BPH with urinary obstruction    ACC/AHA stage D systolic heart failure    Anticoagulation goal of INR 2.0 to 2.5    Falls    Clavicle fracture    CKD (chronic kidney disease), stage III    Iron deficiency anemia    H/O epistaxis    Vertigo    GI bleed    S/P TVR (tricuspid valve repair)    S/P ventricular assist device    S/P endoscopy      ====================ASSESSMENT ==============  Stage D NICM, s/p HM2 on 2017   Recent driveline infection on 2021   acute hypoxemic respiratory failure  aspiration pneumonia  septic shock  cardiogenic shock  GI bleed , s/p enteroscopy today   Anemia, in setting of melena   coagulopathy   Stress hyperglycemia   Hemodynamic instability   Hypertension     Plan:  ====================== NEUROLOGY=====================  Sedated with IV Propofol to maintain vent synchrony   Tylenol for analgesia    acetaminophen  IVPB .. 1000 milliGRAM(s) IV Intermittent once  propofol Infusion 20 MICROgram(s)/kG/Min (9.42 mL/Hr) IV Continuous <Continuous>    ==================== RESPIRATORY======================  S/p endoscopy, patient tachypneic with persistent cough, CXR suspicious for aspiration, was intubated for respiratory support in setting of concern for sepsis   Respiratory status required full ventilatory support, close monitoring of respiratory rate and breathing pattern, the following of ABG’s with A-line monitoring, continuous pulse oximetry monitoring    Mechanical Ventilation:  Mode: AC/ CMV (Assist Control/ Continuous Mandatory Ventilation)  RR (machine): 12  TV (machine): 500  FiO2: 100  PEEP: 5  ITime: 1  MAP: 11  PIP: 19      ====================CARDIOVASCULAR==================  Stage D NICM, s/p HM2 on 2017; recent driveline infection on 2021   Admitted for anemia with supratherapeutic INR 8.84  Echo 6/15: severe global LV systolic dysfunction with HM2 in place, decreased RV systolic function, interventricular septums bows slightly to right, otherwise grossly similar to prior   Continue invasive hemodynamic monitoring   Blood pressure support with IV Cardene   Rate control with Amiodarone     inotropes epinephrine and dobutamine  Bp support vasopressin and norepi  titrate drips    aMIOdarone    Tablet 200 milliGRAM(s) Oral daily  niCARdipine Infusion 5 mG/Hr (25 mL/Hr) IV Continuous <Continuous>    ===================HEMATOLOGIC/ONC ===================  Anemia in setting of melena and supratherapeutic INR of 8.84 on admission   INR now downtrending to 1.27->1.17, will continue to monitor closely.   Monitor H&H/Plts     ===================== RENAL =========================  Continue monitoring urine output, I&Os, BUN/Cr   Monitor and replete lytes prn     ==================== GASTROINTESTINAL===================  Abdominal XR today: nonobstructive bowel gas pattern and large stool burden.  S/p capsule study notable for bleed in small bowel s/p balloon endoscopy today, notable for old blood in proximal ileum, no acute intervention indicated   NPO post procedure, will advance diet when appropriate     GI prophylaxis, pantoprazole  Injectable 40 milliGRAM(s) IV Push every 12 hours  sodium chloride 0.9%. 1000 milliLiter(s) (10 mL/Hr) IV Continuous <Continuous>    =======================    ENDOCRINE  =====================  Continue glucose control with admelog sliding scale for stress hyperglycemia    insulin lispro (ADMELOG) corrective regimen sliding scale   SubCutaneous every 6 hours    ========================INFECTIOUS DISEASE================  Concern for sepsis +rigors with temp up to 101.0F, started Zosyn and BCx2 sent, will f/u  discussed with dr Schofield  piperacillin/tazobactam IVPB.. 3.375 Gram(s) IV Intermittent every 8 hours        Patient requires continuous monitoring with:  bedside rhythm monitoring, arterial line, pulse oximetry, ventilator management and monitoring; intermittent blood gas analysis.  Care plan discussed with ICU care team.  patient remain critical; required more than usual post op care; I have spent 70 minutes providing non routine post op care, revaluated multiple times during the day .     By signing my name below, I, Agnieszka Cherry, attest that this documentation has been prepared under the direction and in the presence of Simon Santo MD.  Electronically signed: Romel Shaffer, 06-15-21 @ 19:39    I, Simon Sanot, personally performed the services described in this documentation. all medical record entries made by the romel were at my direction and in my presence. I have reviewed the chart and agree that the record reflects my personal performance and is accurate and complete  Electronically signed: Simon Santo, 06-15-21 @ 19:39

## 2021-06-15 NOTE — PROGRESS NOTE ADULT - SUBJECTIVE AND OBJECTIVE BOX
CRITICAL CARE ATTENDING - CTICU    MEDICATIONS  (STANDING):  aMIOdarone    Tablet 200 milliGRAM(s) Oral daily  insulin lispro (ADMELOG) corrective regimen sliding scale   SubCutaneous three times a day before meals  insulin lispro (ADMELOG) corrective regimen sliding scale   SubCutaneous at bedtime  pantoprazole  Injectable 40 milliGRAM(s) IV Push every 12 hours  sodium chloride 0.9% lock flush 3 milliLiter(s) IV Push every 8 hours                                    7.7    8.25  )-----------( 137      ( 15 Jamil 2021 08:03 )             25.0       06-15    133<L>  |  101  |  36<H>  ----------------------------<  103<H>  3.7   |  20<L>  |  1.23    Ca    8.7      15 Jamil 2021 00:17  Phos  2.2     06-15  Mg     2.0     06-15    TPro  6.5  /  Alb  3.6  /  TBili  0.6  /  DBili  x   /  AST  18  /  ALT  8<L>  /  AlkPhos  55  06-15      PT/INR - ( 15 Jamil 2021 02:05 )   PT: 15.1 sec;   INR: 1.27 ratio         PTT - ( 15 Jamil 2021 02:05 )  PTT:21.5 sec        Daily Height in cm: 177.8 (2021 11:44)    Daily Weight in k (15 Jamil 2021 00:00)      06-14 @ 07:01  -  06-15 @ 07:00  --------------------------------------------------------  IN: 2500 mL / OUT: 950 mL / NET: 1550 mL        Critically Ill patient  : [ ] preoperative ,   [x] post operative    Requires :  [ ] Arterial Line   [ ] Central Line  [ ] PA catheter  [ ] IABP  [ ] ECMO  [x] LVAD  [ ] Ventilator  [ ] pacemaker [ ] Impella.                      [x] ABG's     [x] Pulse Oxymetry Monitoring  Bedside evaluation , monitoring , treatment of hemodynamics , fluids , IVP/ IVCD meds.        Diagnosis:     GI Bleeding    Hypotension    Hypovolemia    Hemodynamic lability,  instability. Requires IVCD [ ] vasopressors [ ] inotropes  [ ] vasodilator  [x ]IVSS fluid / Blood / Products   to maintain MAP, perfusion, C.I.     LVAD patient    Melena    Anticoagulation with Coumadin  - coagulopathy    Transfused on - 2 units PRBC    Vitamin K     FFP    GI Consultation - Upper Endoscopy ?    Renal Failure - Acute Kidney Injury     Metabolic Acidosis        ILuis Fernando, personally performed the services described in this documentation. All medical record entries made by the luisibe were at my direction and in my presence. I have reviewed the chart and agree that the record reflects my personal performance and is accurate and complete.   Luis Fernando Thompson MD.       By signing my name below, I, Brian Robin, attest that this documentation has been prepared under the direction and in the presence of Luis Fernando Thompson MD.   Electronically Signed: Cesia Gonzalez 06-15-21 @ 08:38        Discussed with CT surgeon, Physician Assistant - Nurse Practitioner- Critical care medicine team.   Dicussed at  AM / PM rounds.   Chart, labs , films reviewed.    Cumulative Critical Care Time Given Today:  CRITICAL CARE ATTENDING - CTICU    MEDICATIONS  (STANDING):  aMIOdarone    Tablet 200 milliGRAM(s) Oral daily  insulin lispro (ADMELOG) corrective regimen sliding scale   SubCutaneous three times a day before meals  insulin lispro (ADMELOG) corrective regimen sliding scale   SubCutaneous at bedtime  pantoprazole  Injectable 40 milliGRAM(s) IV Push every 12 hours  sodium chloride 0.9% lock flush 3 milliLiter(s) IV Push every 8 hours                                    7.7    8.25  )-----------( 137      ( 15 Jamil 2021 08:03 )             25.0       06-15    133<L>  |  101  |  36<H>  ----------------------------<  103<H>  3.7   |  20<L>  |  1.23    Ca    8.7      15 Jamil 2021 00:17  Phos  2.2     06-15  Mg     2.0     06-15    TPro  6.5  /  Alb  3.6  /  TBili  0.6  /  DBili  x   /  AST  18  /  ALT  8<L>  /  AlkPhos  55  06-15      PT/INR - ( 15 Jamil 2021 02:05 )   PT: 15.1 sec;   INR: 1.27 ratio         PTT - ( 15 Jamil 2021 02:05 )  PTT:21.5 sec        Daily Height in cm: 177.8 (2021 11:44)    Daily Weight in k (15 Jamil 2021 00:00)      06-14 @ 07:01  -  06-15 @ 07:00  --------------------------------------------------------  IN: 2500 mL / OUT: 950 mL / NET: 1550 mL        Critically Ill patient  : [ ] preoperative ,   [x] post operative    Requires :  [ x] Arterial Line   [x ] Central Line  [ ] PA catheter  [ ] IABP  [ ] ECMO  [x] LVAD  [ ] Ventilator  [ ] pacemaker [ ] Impella.                      [x] ABG's     [x] Pulse Oxymetry Monitoring  Bedside evaluation , monitoring , treatment of hemodynamics , fluids , IVP/ IVCD meds.        Diagnosis:     GI Bleeding    Hypotension    Hypovolemia    Hemodynamic lability,  instability. Requires IVCD [ ] vasopressors [ ] inotropes  [ ] vasodilator  [x ]IVSS fluid / Blood / Products   to maintain MAP, perfusion, C.I.     LVAD patient    Melena    Anticoagulation with Coumadin  - coagulopathy    Transfusion - 5 PRBC  /  3  FFP    Vitamin K     FFP    GI Consultation - Upper Endoscopy ?    Renal Failure - Acute Kidney Injury     Metabolic Acidosis    Hyponatremia     Prerenal Azotemia     Pill Study -  c/w small bowel bleeding focus        I, Luis Fernando Thompson, personally performed the services described in this documentation. All medical record entries made by the luisibmel were at my direction and in my presence. I have reviewed the chart and agree that the record reflects my personal performance and is accurate and complete.   Luis Fernando Thompson MD.       By signing my name below, I, Brian Robin, attest that this documentation has been prepared under the direction and in the presence of Luis Fernando Thompson MD.   Electronically Signed: Cesia Gonzalez 15- @ 08:38        Discussed with CT surgeon, Physician Assistant - Nurse Practitioner- Critical care medicine team.   Dicussed at  AM / PM rounds.   Chart, labs , films reviewed.    Cumulative Critical Care Time Given Today: 30 min

## 2021-06-15 NOTE — ED PROVIDER NOTE - PHYSICAL EXAMINATION
Patient no longer on Coumadin. He is only taking 2 baby aspirins. He is to continue doing this. No further INR checks indicated per Dr. Clifford.    I called Keith to report this. He verbalized understanding and appreciation of our conversation.    CONSTITUTIONAL: NAD, awake, alert  HEAD: Normocephalic; atraumatic  ENMT: External appears normal, MMM  CARD: Normal Sl, S2; no audible murmurs, + LVAD in place  RESP: normal wob, lungs ctab  ABD: soft, non-distended; non-tender  Rectal: w/ RN at bedside, no melena, no gross blood   MSK: no edema, normal ROM in all four extremities  SKIN: Warm, dry, no rashes  NEURO: aaox3, moving all extremities spontaneously

## 2021-06-15 NOTE — PROGRESS NOTE ADULT - ATTENDING COMMENTS
Hemoglobin has partially responded to 4 units PRBC. VCE with bleeding and awaiting EGD +/- push enteroscopy today. MAP at goal. Continue to hold anticoagulation and aspirin. LDH downtrending and will continue to follow closely.

## 2021-06-15 NOTE — CONSULT NOTE ADULT - SUBJECTIVE AND OBJECTIVE BOX
ID CONSULTATION-- Morris Prater [phone]    Patient is a 66y old  Male who presents with a chief complaint of Anemia, Supratherapeutic INR, Dark Stools (15 Jamil 2021 11:45)    HPI:  65M PMH ACC/AHA stage D HF due to NICM HM2 LVAD , TV annuloplasty ring 9/12/17 as destination therapy due to severe peripheral artery disease with significant stenosis  SIADH, Depression, CKD-3 with hyperkalemia, past E. coli UTIs, driveline drainage (1/7/21) and COVID-19 (back in April 2020)  He was recently seen in clinic where he complained of abdominal pain and dark stools w constipation back in May. He presents to [facility] ER today weakness and fatigue, moderate and + Black stools for three days, on coumadin secondary to warfarin use in the setting of an LVAD. Patient has required transfusions for GIB in the past. Mostly recently back in jan 2021 pt had anemia with dark stools. No interventions was done at that time. However Last Endoscopy was done in July 2020 (negative). Today labs show patient is anemic with H/H of 4.5/16.3,. INR is 8.84 MAP in the 90s, Temp 35.1. He denies any chest pain, shortness of breath, dizziness, abd pain, nausea or vomiting.      Patient underwent upper endoscopy for GI bleeding source and upon return to CTU developed tachy[pnea and shaking chills  also hypertensive with low flow VAD alarms  Looks very ill     (14 Jun 2021 16:57)      PAST MEDICAL & SURGICAL HISTORY:  CHF (congestive heart failure)    CAD (coronary artery disease)    Depression    Pleural effusion    History of 2019 novel coronavirus disease (COVID-19)  april 2020    Hemorrhoids    Bleeding hemorrhoids    Peripheral arterial disease    Claudication    BPH with urinary obstruction    ACC/AHA stage D systolic heart failure    Anticoagulation goal of INR 2.0 to 2.5    Falls    Clavicle fracture    CKD (chronic kidney disease), stage III    Iron deficiency anemia    H/O epistaxis    Vertigo    GI bleed    S/P TVR (tricuspid valve repair)    S/P ventricular assist device    S/P endoscopy        SOCIAL:  come in from home    FAMILY HISTORY:  non contributory    REVIEW OF SYSTEMS  General:  looks quite ill  marked tachypnea despite hi-flow oxygen  chills    Allergic/Immunologic:	No hives or rash   Allergies    No Known Allergies    Intolerances        ANTIMICROBIALS:    piperacillin/tazobactam IVPB. 3.375 Gram(s) IV Intermittent once  piperacillin/tazobactam IVPB.. 3.375 Gram(s) IV Intermittent every 8 hours      Vital Signs Last 24 Hrs  T(C): 36.4 (15 Jamil 2021 16:00), Max: 36.6 (15 Jamil 2021 10:54)  T(F): 97.6 (15 Jamil 2021 16:00), Max: 97.8 (15 Jamil 2021 10:54)  HR: 101 (15 Jamil 2021 17:00) (71 - 101)  BP: --  BP(mean): 90 (14 Jun 2021 21:00) (90 - 90)  RR: 24 (15 Jamil 2021 17:00) (15 - 33)  SpO2: 90% (15 Jamil 2021 17:00) (90% - 100%)    PHYSICAL EXAM: tachypneic and ill appearing      Constitutional:Comfortable.Awake and alert  unable to answer questions SOB    Eyes:PERRL EOMI.NO discharge or conjunctival injection    ENMT:No sinus tenderness.No thrush.No pharyngeal exudate or erythema.    Neck:Supple,No LN,no JVD      Respiratory: decreased BS throughout the entire left lung field  right lung BS audible    Cardiovascular: LVAD sounds    Gastrointestinal:Soft BS(+) no tenderness no masses ,No rebound or guarding    Genitourinary:No CVA tendereness     Rectal:    Extremities:No cyanosis,clubbing or edema.    Vascular:peripheral pulses felt    Neurological: alert but unable to answer questions respiratory distress    Skin:No rash     Lymph Nodes:No palpable LNs    Musculoskeletal:No joint swelling or LOM    Psychiatric:Affect normal.                              7.7    8.25  )-----------( 137      ( 15 Jamil 2021 08:03 )             25.0       06-15    133<L>  |  101  |  36<H>  ----------------------------<  103<H>  3.7   |  20<L>  |  1.23    Ca    8.7      15 Jamil 2021 00:17  Phos  2.2     06-15  Mg     2.0     06-15    TPro  6.5  /  Alb  3.6  /  TBili  0.6  /  DBili  x   /  AST  18  /  ALT  8<L>  /  AlkPhos  55  06-15      RECENT CULTURES:  pending    RADIOLOGY:    < from: Xray Chest 1 View- PORTABLE-Routine (Xray Chest 1 View- PORTABLE-Routine in AM.) (06.15.21 @ 02:59) >  IMPRESSION:    The heart is enlarged. The lungs appear to be clear. No pleural effusion. No pneumothorax. A Cordis sheet catheter is seen on the right and the tip is in superior vena cava. A left ventricle assisted device is present. A loop recorder is overlying the left lower hemithorax.    < end of copied text >  MOre recent portable CXR not yet read officially shows almost complete 'white out of the left lung'  IMPRESSION:    Rx:

## 2021-06-15 NOTE — PROGRESS NOTE ADULT - SUBJECTIVE AND OBJECTIVE BOX
Patient Examined lying in bed  "I'm not feeling well"  He received 4 units PRBC and 2 units FFP, plus Vitamin K    aMIOdarone    Tablet 200 milliGRAM(s) Oral daily  cyanocobalamin 1000 MICROGram(s) Oral daily  finasteride 5 milliGRAM(s) Oral daily  folic acid 1 milliGRAM(s) Oral daily  gabapentin 100 milliGRAM(s) Oral at bedtime  influenza   Vaccine 0.5 milliLiter(s) IntraMuscular once  lidocaine   Patch 1 Patch Transdermal daily  metoprolol succinate ER 25 milliGRAM(s) Oral daily  mirtazapine 7.5 milliGRAM(s) Oral at bedtime  pantoprazole    Tablet 40 milliGRAM(s) Oral every 12 hours  polyethylene glycol 3350 17 Gram(s) Oral two times a day  senna 2 Tablet(s) Oral at bedtime  simethicone 80 milliGRAM(s) Chew three times a day  sodium chloride 0.9% lock flush 3 milliLiter(s) IV Push every 8 hours  spironolactone 25 milliGRAM(s) Oral daily  Warfarin 4 mg every night  Aspirin 162 mg daily    ICU Vital Signs Last 24 Hrs  T(C): 36.6 (15 Jamil 2021 11:00), Max: 36.8 (2021 16:00)  T(F): 97.8 (15 Jamil 2021 11:00), Max: 98.2 (2021 16:00)  HR: 74 (15 Jamil 2021 11:00) (71 - 104)  BP: 91/- (2021 12:14) (91/- - 91/-)  BP(mean): 90 (2021 21:00) (80 - 90)  ABP: 108/67 (15 Jamil 2021 11:00) (78/57 - 109/67)  ABP(mean): 78 (15 Jamil 2021 11:00) (61 - 78)  RR: 27 (15 Jamil 2021 11:00) (15 - 36)  SpO2: 96% (15 Jamil 2021 11:00) (95% - 100%)    I&O's Summary    2021 07:01  -  15 Jamil 2021 07:00  --------------------------------------------------------  IN: 2500 mL / OUT: 950 mL / NET: 1550 mL    15 Jamil 2021 07:01  -  15 Jamil 2021 11:51  --------------------------------------------------------  IN: 0 mL / OUT: 250 mL / NET: -250 mL      Daily Height in cm: 177.8 (15 Jamil 2021 11:47)    Daily Weight in k (15 Jamil 2021 00:00)                          7.7    8.25  )-----------( 137      ( 15 Jamil 2021 08:03 )             25.0     06-15    133<L>  |  101  |  36<H>  ----------------------------<  103<H>  3.7   |  20<L>  |  1.23    Ca    8.7      15 Jamil 2021 00:17  Phos  2.2     06-15  Mg     2.0     06-15    TPro  6.5  /  Alb  3.6  /  TBili  0.6  /  DBili  x   /  AST  18  /  ALT  8<L>  /  AlkPhos  55  06-15    PT/INR - ( 15 Jamil 2021 02:05 )   PT: 15.1 sec;   INR: 1.27 ratio     PTT - ( 15 Jamil 2021 02:05 )  PTT:21.5 sec           6/15: LDH: 304  : LDH: 443    PHYSICAL EXAM:      Constitutional: Comfortable, no distress, pale looking  Neck: NO JVD  Respiratory: lungs clear  Cardiovascular: +hum of LVAD  Gastrointestinal: soft, NT, ND   Extremities: No C/C/E noted  Neurological: A&O X 3  Skin: No rashes noted  Psychiatric: normal mood and affect    LVAD Interrogation  VAD TYPE HM 2  Speed 8800  Flow 5.2  Power 4.8- 4.9  PI 5.1  Assessment of driveline exit site: driveline dressing c/d/i  Programming changes: no changes made  No alarms noted and a few PI but no speed drops

## 2021-06-15 NOTE — PRE-ANESTHESIA EVALUATION ADULT - NSANTHVITALSIGNSFT_GEN_ALL_CORE
ICU Vital Signs Last 24 Hrs  T(C): 36.2 (15 Jamil 2021 12:00), Max: 36.8 (14 Jun 2021 16:00)  T(F): 97.1 (15 Jamil 2021 12:00), Max: 98.2 (14 Jun 2021 16:00)  HR: 75 (15 Jamil 2021 12:00) (71 - 104)  BP: --  BP(mean): 90 (14 Jun 2021 21:00) (80 - 90)  ABP: 110/68 (15 Jamil 2021 12:00) (78/57 - 110/68)  ABP(mean): 80 (15 Jamil 2021 12:00) (61 - 80)  RR: 18 (15 Jamil 2021 12:00) (15 - 36)  SpO2: 94% (15 Jamil 2021 12:00) (94% - 100%)

## 2021-06-15 NOTE — PRE PROCEDURE NOTE - PRE PROCEDURE EVALUATION
Attending Physician:                      Sid      Procedure: EGD/Single Balloon Enteroscopy    Indication for Procedure: GIB  ________________________________________________________  PAST MEDICAL & SURGICAL HISTORY:  CHF (congestive heart failure)    CAD (coronary artery disease)    Depression    Pleural effusion    History of 2019 novel coronavirus disease (COVID-19)  april 2020    Hemorrhoids    Bleeding hemorrhoids    Peripheral arterial disease    Claudication    BPH with urinary obstruction    ACC/AHA stage D systolic heart failure    Anticoagulation goal of INR 2.0 to 2.5    Falls    Clavicle fracture    CKD (chronic kidney disease), stage III    Iron deficiency anemia    H/O epistaxis    Vertigo    GI bleed    S/P TVR (tricuspid valve repair)    S/P ventricular assist device    S/P endoscopy      ALLERGIES:  No Known Allergies    HOME MEDICATIONS:  cyanocobalamin 1000 mcg oral tablet: 1 tab(s) orally once a day  folic acid 1 mg oral tablet: 1 tab(s) orally once a day  gabapentin 100 mg oral capsule: 1 cap(s) orally 2 times a day    AICD/PPM: [ ] yes   [x ] no    PERTINENT LAB DATA:                        7.7    8.25  )-----------( 137      ( 15 Jamil 2021 08:03 )             25.0     06-15    133<L>  |  101  |  36<H>  ----------------------------<  103<H>  3.7   |  20<L>  |  1.23    Ca    8.7      15 Jamil 2021 00:17  Phos  2.2     06-15  Mg     2.0     06-15    TPro  6.5  /  Alb  3.6  /  TBili  0.6  /  DBili  x   /  AST  18  /  ALT  8<L>  /  AlkPhos  55  06-15    PT/INR - ( 15 Jamil 2021 02:05 )   PT: 15.1 sec;   INR: 1.27 ratio         PTT - ( 15 Jamil 2021 02:05 )  PTT:21.5 sec            PHYSICAL EXAMINATION:    Height (cm): 177.8  Weight (kg): 78.5  BMI (kg/m2): 24.8  BSA (m2): 1.96T(C): 36.6  HR: 74  BP: 91/-  RR: 27  SpO2: 96%    Constitutional: NAD    Neck:  No JVD  Respiratory: CTAB/L  Cardiovascular: S1 and S2  Gastrointestinal: BS+, soft, NT/ND  Extremities: No peripheral edema  Neurological: A/O x 3, no focal deficits        COMMENTS:    The patient is a suitable candidate for the planned procedure unless box checked [ ]  No, explain:

## 2021-06-16 NOTE — PROGRESS NOTE ADULT - PROBLEM SELECTOR PLAN 2
- respiratory culture NGTD and blood cultures in lab  - check procalcitonin   - ID following, continue empiric zosyn for now  - wean pressors as tolerates

## 2021-06-16 NOTE — PROGRESS NOTE ADULT - SUBJECTIVE AND OBJECTIVE BOX
Subjective:  Yesterday after EGD/enteroscopy (no active bleeding found) he acutely decompensated with fevers, hypertension, low flow alarms, respiratory distress, and new infiltrate on CXR. He was intubated for pending respiratory failure, cultured, and started on broad spectrum antibiotics. TTE not suggestive of overt LVAD dysfunction. Started on pressors which are being weaned.     Medications:  aMIOdarone    Tablet 200 milliGRAM(s) Oral daily  chlorhexidine 0.12% Liquid 15 milliLiter(s) Oral Mucosa every 12 hours  chlorhexidine 2% Cloths 1 Application(s) Topical <User Schedule>  dexMEDEtomidine Infusion 0.5 MICROgram(s)/kG/Hr IV Continuous <Continuous>  dextrose 50% Injectable 50 milliLiter(s) IV Push every 15 minutes  EPINEPHrine    Infusion 0.02 MICROgram(s)/kG/Min IV Continuous <Continuous>  heparin  Infusion 400 Unit(s)/Hr IV Continuous <Continuous>  insulin regular Infusion 3 Unit(s)/Hr IV Continuous <Continuous>  norepinephrine Infusion 0.05 MICROgram(s)/kG/Min IV Continuous <Continuous>  pantoprazole  Injectable 40 milliGRAM(s) IV Push every 12 hours  piperacillin/tazobactam IVPB.. 3.375 Gram(s) IV Intermittent every 8 hours  propofol Infusion 20 MICROgram(s)/kG/Min IV Continuous <Continuous>  sodium chloride 0.9% lock flush 3 milliLiter(s) IV Push every 8 hours  sodium chloride 0.9%. 1000 milliLiter(s) IV Continuous <Continuous>  vasopressin Infusion 0.033 Unit(s)/Min IV Continuous <Continuous>    Vitals:  T(C): 37.6 (21 @ 12:00), Max: 38.7 (06-15-21 @ 20:00)  HR: 72 (21 @ 13:30) (71 - 143)  BP: --  BP(mean): --  RR: 20 (21 @ 13:30) (14 - 59)  SpO2: 98% (21 @ 13:30) (87% - 100%)    Daily     Daily Weight in k.4 (2021 00:00)    Weight (kg): 78.5 (06-15 @ 12:14)    I&O's Summary    15 Jamil 2021 07:01  -  2021 07:00  --------------------------------------------------------  IN: 4149.4 mL / OUT: 1475 mL / NET: 2674.4 mL    2021 07:01  -  2021 13:33  --------------------------------------------------------  IN: 556.6 mL / OUT: 345 mL / NET: 211.6 mL        Physical Exam:  Appearance: Intubated, sedated  HEENT: JVP non elevated  Cardiovascular: VAD hums   Respiratory: mechanical breath sounds   Gastrointestinal: Soft, Non-tender, non-distended	  Skin: no skin lesions  Neurologic: unable to assess  Extremities: No LE edema, warm and well perfused  Psychiatry: sedated    LVAD interrogation   Flow: 3.2  Speed: 8800  PI: 5.6  Power: 4.1   Alarms: frequent low flow alarms overnight   Changes to device programming: none       Labs:                        8.4    12.67 )-----------( 111      ( 2021 04:18 )             26.6     06-16    134<L>  |  100  |  20  ----------------------------<  225<H>  4.0   |  17<L>  |  1.07    Ca    8.1<L>      2021 01:18  Phos  1.7     06-16  Mg     1.7     06-16    TPro  6.4  /  Alb  4.1  /  TBili  0.8  /  DBili  x   /  AST  17  /  ALT  8<L>  /  AlkPhos  50  06-16      PT/INR - ( 2021 01:18 )   PT: 14.0 sec;   INR: 1.18 ratio         PTT - ( 2021 01:18 )  PTT:21.2 sec          Lactate Dehydrogenase, Serum: 272 U/L ( @ 01:18)  Lactate Dehydrogenase, Serum: 283 U/L (06-15 @ 21:01)  Lactate Dehydrogenase, Serum: 304 U/L (06-15 @ 00:17)  Lactate Dehydrogenase, Serum: 443 U/L ( @ 12:25)    Lactate, Blood: 1.3 mmol/L ( @ 17:51)

## 2021-06-16 NOTE — PROGRESS NOTE ADULT - SUBJECTIVE AND OBJECTIVE BOX
Follow Up: sepsis    Interval History/ROS:Patient is a 66y old  Male who presents with a chief complaint of Anemia, Supratherapeutic INR, Dark Stools (2021 08:23)      REVIEW OF SYSTEMS  [  ] ROS unobtainable because:    [  ] All other systems negative except as noted below    Constitutional:  [ ] fever [ ] chills  [ ] weight loss  [ ]night sweat  [ ]poor appetite/PO intake [ ]fatigue   Skin:  [ ] rash [ ] phlebitis	  Eyes: [ ] icterus [ ] pain  [ ] discharge	  ENMT: [ ] sore throat  [ ] thrush [ ] ulcers [ ] exudates [ ]anosmia  Respiratory: [ ] dyspnea [ ] hemoptysis [ ] cough [ ] sputum	  Cardiovascular:  [ ] chest pain [ ] palpitations [ ] edema	  Gastrointestinal:  [ ] nausea [ ] vomiting [ ] diarrhea [ ] constipation [ ] pain	  Genitourinary:  [ ] dysuria [ ] frequency [ ] hematuria [ ] discharge [ ] flank pain  [ ] incontinence  Musculoskeletal:  [ ] myalgias [ ] arthralgias [ ] arthritis  [ ] back pain  Neurological:  [ ] headache [ ] weakness [ ] seizures  [ ] confusion/altered mental status    Allergies  No Known Allergies        ANTIMICROBIALS:    piperacillin/tazobactam IVPB.. 3.375 every 8 hours      OTHER MEDS: MEDICATIONS  (STANDING):  aMIOdarone    Tablet 200 daily  dexMEDEtomidine Infusion 0.5 <Continuous>  dextrose 50% Injectable 50 every 15 minutes  DOBUTamine Infusion 2 <Continuous>  EPINEPHrine    Infusion 0.02 <Continuous>  heparin  Infusion 400 <Continuous>  insulin lispro (ADMELOG) corrective regimen sliding scale  every 6 hours  insulin regular Infusion 3 <Continuous>  norepinephrine Infusion 0.05 <Continuous>  pantoprazole  Injectable 40 every 12 hours  propofol Infusion 20 <Continuous>  vasopressin Infusion 0.033 <Continuous>      Vital Signs Last 24 Hrs  T(F): 98.8 (21 @ 08:00), Max: 101.7 (06-15-21 @ 20:00)    Vital Signs Last 24 Hrs  HR: 77 (21 @ 10:30) (71 - 143)  BP: --  RR: 22 (21 @ 10:30)  SpO2: 99% (21 @ 10:30) (87% - 100%)  Wt(kg): --    EXAM:  GA: NAD  HEENT: oral cavity no lesion  CV: nl S1/S2, no RMG  Lungs: CTAB  Abd: BS+, soft, nontender, no rebounding pain  Ext: no edema  Skin:  IV: no phlebitis    Labs:                        8.4    12.67 )-----------( 111      ( 2021 04:18 )             26.6     -16    134<L>  |  100  |  20  ----------------------------<  225<H>  4.0   |  17<L>  |  1.07    Ca    8.1<L>      2021 01:18  Phos  1.7       Mg     1.7         TPro  6.4  /  Alb  4.1  /  TBili  0.8  /  DBili  x   /  AST  17  /  ALT  8<L>  /  AlkPhos  50        WBC Trend:  WBC Count: 12.67 (21 @ 04:18)  WBC Count: 13.66 (21 @ 01:18)  WBC Count: 4.43 (06-15-21 @ 19:10)  WBC Count: 8.25 (06-15-21 @ 08:03)      Creatine Trend:  Creatinine, Serum: 1.07 ()  Creatinine, Serum: 1.23 (06-15)  Creatinine, Serum: 1.66 ()      Liver Biochemical Testing Trend:  Alanine Aminotransferase (ALT/SGPT): 8 *L* ()  Alanine Aminotransferase (ALT/SGPT): 8 *L* (06-15)  Alanine Aminotransferase (ALT/SGPT): 7 *L* ()  Alanine Aminotransferase (ALT/SGPT): 46 *H* ()  Alanine Aminotransferase (ALT/SGPT): 19 ()  Aspartate Aminotransferase (AST/SGOT): 17 (21 @ 01:18)  Aspartate Aminotransferase (AST/SGOT): 18 (06-15-21 @ 00:17)  Aspartate Aminotransferase (AST/SGOT): 18 (21 @ 12:25)  Aspartate Aminotransferase (AST/SGOT): 58 (21 @ 05:07)  Aspartate Aminotransferase (AST/SGOT): 31 (21 @ 06:50)  Bilirubin Total, Serum: 0.8 (-)  Bilirubin Total, Serum: 0.6 (06-15)  Bilirubin Total, Serum: 0.2 ()  Bilirubin Total, Serum: 0.2 ()  Bilirubin Total, Serum: 0.2 ()      Trend LDH  21 @ 01:18  272<H>  06-15-21 @ 21:01  283<H>  06-15-21 @ 00:17  304<H>  21 @ 12:25  443<H>  21 @ 05:07  427<H>      Urinalysis Basic - ( 15 Jamil 2021 18:54 )  Color: Light Yellow / Appearance: Clear / S.018 / pH: x  Gluc: x / Ketone: Small  / Bili: Negative / Urobili: Negative   Blood: x / Protein: Trace / Nitrite: Negative   Leuk Esterase: Negative / RBC: 1 /hpf / WBC 0 /HPF   Sq Epi: x / Non Sq Epi: 1 /hpf / Bacteria: Negative        MICROBIOLOGY:      Culture - Blood (collected 2021 23:00)  Source: .Blood Blood-Venous  Final Report:    No Growth Final    Culture - Blood (collected 2021 23:00)  Source: .Blood Blood-Venous  Final Report:    No Growth Final    Culture - Blood (collected 2021 16:36)  Source: .Blood Blood  Final Report:    Growth in anaerobic bottle: Propionibacterium acnes also known as,    Cutibacterium acnes    "Susceptibilities not performed"    ***Blood Panel PCR results on this specimen are available    approximately 3 hours after the Gram stain result.***    Gram stain,PCR, and/or culture results may not always    correspond due to difference in methodologies.    ************************************************************    This PCR assay was performed by multiplex PCR. This    Assay tests for 66 bacterial and resistancegene targets.    Please refer to the [facility] test directory    at [email or web address] for details.  Organism: Blood Culture PCR  Organism: Blood Culture PCR    Sensitivities:      -  Cutibacterium acnes: Detec      Method Type: PCR    Culture - Blood (collected 2021 16:36)  Source: .Blood Blood  Final Report:    No Growth Final    Culture - Blood (collected 15 Jul 2020 00:59)  Source: .Blood Blood-Peripheral  Final Report:    No Growth Final    Culture - Blood (collected 2020 14:49)  Source: .Blood Blood-Peripheral  Final Report:    No Growth Final    Culture - Urine (collected 2020 14:42)  Source: .Urine Clean Catch (Midstream)  Final Report:    <10,000 CFU/mL Normal Urogenital Bria    Culture - Urine (collected 2020 18:08)  Source: .Urine Clean Catch (Midstream)  Final Report:    <10,000 CFU/mL Normal Urogenital Bria    Culture - Urine (collected 2020 00:56)  Source: .Urine Clean Catch (Midstream)  Final Report:    No growth    Culture - Blood (collected 31 Mar 2020 21:23)  Source: .Blood Blood-Peripheral  Final Report:    No Growth Final    HIV-1/2 Combo Result: Nonreact (21 @ 08:18)  ABS CD4: 129 /uL (20 @ 08:38)    COVID-19 PCR: NotDetec (21 @ 12:24)    COVID-19 [place] Domain AB Interp: Positive (06-15-21 @ 10:15)  COVID-19 IgG Antibody Interpretation: Positive (21 @ 08:55)  COVID-19 IgG Antibody Interpretation: Positive (20 @ 08:50)    Lactate Dehydrogenase, Serum: 272 (-16)  Lactate Dehydrogenase, Serum: 283 (06-15)  Lactate Dehydrogenase, Serum: 304 (15)  Lactate Dehydrogenase, Serum: 443 (-14)    Blood Gas Arterial, Lactate: 2.0 ( @ 06:10)  Blood Gas Arterial, Lactate: 2.9 ( @ 04:16)  Blood Gas Arterial, Lactate: 3.4 ( @ 02:00)  Blood Gas Arterial, Lactate: 3.0 ( @ 00:32)    RADIOLOGY:  imaging below personally reviewed    < from: Xray Chest 1 View- PORTABLE-Routine (Xray Chest 1 View- PORTABLE-Routine in AM.) (21 @ 02:39) >  IMPRESSION:    The heart is slightly enlarged. Left pleural effusion. Left lower lobe pneumonia and/or atelectasis. The right lung is clear. All life supporting devices are in good position and unchanged when compared to previous study done on Love 15, 2021 at 7:24 PM. No pneumothorax. Status post sternotomy.    < end of copied text >   Follow Up: sepsis    Interval History/ROS:Patient is a 66y old  Male who presents with a chief complaint of Anemia, Supratherapeutic INR, Dark Stools (2021 08:23)  - Started on Zosyn today  - Nurse reported lots of ETT secretion/sputum, sputum sent for cultures    REVIEW OF SYSTEMS  [ X ] ROS unobtainable because:  intubated  [  ] All other systems negative except as noted below    Constitutional:  [ ] fever [ ] chills  [ ] weight loss  [ ]night sweat  [ ]poor appetite/PO intake [ ]fatigue   Skin:  [ ] rash [ ] phlebitis	  Eyes: [ ] icterus [ ] pain  [ ] discharge	  ENMT: [ ] sore throat  [ ] thrush [ ] ulcers [ ] exudates [ ]anosmia  Respiratory: [ ] dyspnea [ ] hemoptysis [ ] cough [ ] sputum	  Cardiovascular:  [ ] chest pain [ ] palpitations [ ] edema	  Gastrointestinal:  [ ] nausea [ ] vomiting [ ] diarrhea [ ] constipation [ ] pain	  Genitourinary:  [ ] dysuria [ ] frequency [ ] hematuria [ ] discharge [ ] flank pain  [ ] incontinence  Musculoskeletal:  [ ] myalgias [ ] arthralgias [ ] arthritis  [ ] back pain  Neurological:  [ ] headache [ ] weakness [ ] seizures  [ ] confusion/altered mental status    Allergies  No Known Allergies    ANTIMICROBIALS:    piperacillin/tazobactam IVPB.. 3.375 every 8 hours    OTHER MEDS: MEDICATIONS  (STANDING):  aMIOdarone    Tablet 200 daily  dexMEDEtomidine Infusion 0.5 <Continuous>  dextrose 50% Injectable 50 every 15 minutes  DOBUTamine Infusion 2 <Continuous>  EPINEPHrine    Infusion 0.02 <Continuous>  heparin  Infusion 400 <Continuous>  insulin lispro (ADMELOG) corrective regimen sliding scale  every 6 hours  insulin regular Infusion 3 <Continuous>  norepinephrine Infusion 0.05 <Continuous>  pantoprazole  Injectable 40 every 12 hours  propofol Infusion 20 <Continuous>  vasopressin Infusion 0.033 <Continuous>      Vital Signs Last 24 Hrs  T(F): 98.8 (21 @ 08:00), Max: 101.7 (06-15-21 @ 20:00)    Vital Signs Last 24 Hrs  HR: 77 (21 @ 10:30) (71 - 143)  BP: --  RR: 22 (21 @ 10:30)  SpO2: 99% (21 @ 10:30) (87% - 100%)  Wt(kg): --    EXAM:  GA: intubated, sedated  HEENT: NG+, Right IJ central line  CV: mechanical sounds  Lungs: clear  Abd: LVAD wire site nontender  Ext: no edema  Right radial A-line  Landrum+      Labs:                        8.4    12.67 )-----------( 111      ( 2021 04:18 )             26.6         134<L>  |  100  |  20  ----------------------------<  225<H>  4.0   |  17<L>  |  1.07    Ca    8.1<L>      2021 01:18  Phos  1.7       Mg     1.7         TPro  6.4  /  Alb  4.1  /  TBili  0.8  /  DBili  x   /  AST  17  /  ALT  8<L>  /  AlkPhos  50        WBC Trend:  WBC Count: 12.67 (21 @ 04:18)  WBC Count: 13.66 (21 @ 01:18)  WBC Count: 4.43 (06-15-21 @ 19:10)  WBC Count: 8.25 (06-15-21 @ 08:03)      Creatine Trend:  Creatinine, Serum: 1.07 ()  Creatinine, Serum: 1.23 (06-15)  Creatinine, Serum: 1.66 ()      Liver Biochemical Testing Trend:  Alanine Aminotransferase (ALT/SGPT): 8 *L* ()  Alanine Aminotransferase (ALT/SGPT): 8 *L* (06-15)  Alanine Aminotransferase (ALT/SGPT): 7 *L* ()  Alanine Aminotransferase (ALT/SGPT): 46 *H* ()  Alanine Aminotransferase (ALT/SGPT): 19 ()  Aspartate Aminotransferase (AST/SGOT): 17 (21 @ 01:18)  Aspartate Aminotransferase (AST/SGOT): 18 (06-15-21 @ 00:17)  Aspartate Aminotransferase (AST/SGOT): 18 (21 @ 12:25)  Aspartate Aminotransferase (AST/SGOT): 58 (21 @ 05:07)  Aspartate Aminotransferase (AST/SGOT): 31 (21 @ 06:50)  Bilirubin Total, Serum: 0.8 ()  Bilirubin Total, Serum: 0.6 (06-15)  Bilirubin Total, Serum: 0.2 ()  Bilirubin Total, Serum: 0.2 ()  Bilirubin Total, Serum: 0.2 ()      Trend LDH  21 @ 01:18  272<H>  06-15-21 @ 21:01  283<H>  06-15-21 @ 00:17  304<H>  21 @ 12:25  443<H>  21 @ 05:07  427<H>      Urinalysis Basic - ( 15 Jamil 2021 18:54 )  Color: Light Yellow / Appearance: Clear / S.018 / pH: x  Gluc: x / Ketone: Small  / Bili: Negative / Urobili: Negative   Blood: x / Protein: Trace / Nitrite: Negative   Leuk Esterase: Negative / RBC: 1 /hpf / WBC 0 /HPF   Sq Epi: x / Non Sq Epi: 1 /hpf / Bacteria: Negative        MICROBIOLOGY:    MRSA PCR Result.: NotDetec (21 @ 23:02)    Culture - Sputum . (21@ 08:20)    Gram Stain:   Rare polymorphonuclear leukocytes per low power field  Rare Squamous epithelial cells per low power field  No organisms seen per oil power field    Specimen Source: .Sputum Sputum      Culture - Blood (collected 2021 23:00)  Source: .Blood Blood-Venous  Final Report:    No Growth Final    Culture - Blood (collected 2021 23:00)  Source: .Blood Blood-Venous  Final Report:    No Growth Final    Culture - Blood (collected 2021 16:36)  Source: .Blood Blood  Final Report:    Growth in anaerobic bottle: Propionibacterium acnes also known as,    Cutibacterium acnes    "Susceptibilities not performed"    ***Blood Panel PCR results on this specimen are available    approximately 3 hours after the Gram stain result.***    Gram stain,PCR, and/or culture results may not always    correspond due to difference in methodologies.    ************************************************************    This PCR assay was performed by multiplex PCR. This    Assay tests for 66 bacterial and resistancegene targets.    Please refer to the [facility] test directory    at [email or web address] for details.  Organism: Blood Culture PCR  Organism: Blood Culture PCR    Sensitivities:      -  Cutibacterium acnes: Detec      Method Type: PCR    Culture - Blood (collected 2021 16:36)  Source: .Blood Blood  Final Report:    No Growth Final    Culture - Blood (collected 15 Jul 2020 00:59)  Source: .Blood Blood-Peripheral  Final Report:    No Growth Final    Culture - Blood (collected 2020 14:49)  Source: .Blood Blood-Peripheral  Final Report:    No Growth Final    Culture - Urine (collected 2020 14:42)  Source: .Urine Clean Catch (Midstream)  Final Report:    <10,000 CFU/mL Normal Urogenital Bria    Culture - Urine (collected 2020 18:08)  Source: .Urine Clean Catch (Midstream)  Final Report:    <10,000 CFU/mL Normal Urogenital Bria    Culture - Urine (collected 2020 00:56)  Source: .Urine Clean Catch (Midstream)  Final Report:    No growth    Culture - Blood (collected 31 Mar 2020 21:23)  Source: .Blood Blood-Peripheral  Final Report:    No Growth Final    HIV-1/2 Combo Result: Nonreact (21 @ 08:18)  ABS CD4: 129 /uL (20 @ 08:38)    COVID-19 PCR: NotDetec (21 @ 12:24)    COVID-19 [place] Domain AB Interp: Positive (06-15-21 @ 10:15)  COVID-19 IgG Antibody Interpretation: Positive (21 @ 08:55)  COVID-19 IgG Antibody Interpretation: Positive (20 @ 08:50)    Lactate Dehydrogenase, Serum: 272 ()  Lactate Dehydrogenase, Serum: 283 (06-15)  Lactate Dehydrogenase, Serum: 304 (06-15)  Lactate Dehydrogenase, Serum: 443 ()    Blood Gas Arterial, Lactate: 2.0 ( @ 06:10)  Blood Gas Arterial, Lactate: 2.9 ( @ 04:16)  Blood Gas Arterial, Lactate: 3.4 ( @ 02:00)  Blood Gas Arterial, Lactate: 3.0 ( @ 00:32)    RADIOLOGY:  imaging below personally reviewed    < from: Xray Chest 1 View- PORTABLE-Routine (Xray Chest 1 View- PORTABLE-Routine in AM.) (21 @ 02:39) >  IMPRESSION:    The heart is slightly enlarged. Left pleural effusion. Left lower lobe pneumonia and/or atelectasis. The right lung is clear. All life supporting devices are in good position and unchanged when compared to previous study done on Love 15, 2021 at 7:24 PM. No pneumothorax. Status post sternotomy.    < end of copied text >

## 2021-06-16 NOTE — DIETITIAN INITIAL EVALUATION ADULT. - PERTINENT LABORATORY DATA
06-16 @ 04:18: , Hemoglobin 8.4<L>, Hematocrit 26.6<L>  06-16 @ 01:18: Sodium 134<L>, Potassium 4.0, Chloride 100, Calcium 8.1<L>, Magnesium 1.7, Phosphorus 1.7<L>, BUN 20, Creatinine 1.07, <H>, Alk Phos 50, ALT/SGPT 8<L>, AST/SGOT 17, HbA1c 5.6%, Total Protein 6.4, Albumin 4.1, Prealbumin --, Total Bilirubin 0.8, Direct Bilirubin --, Hemoglobin 8.8<L>, Hematocrit 28.2<L>

## 2021-06-16 NOTE — PROGRESS NOTE ADULT - SUBJECTIVE AND OBJECTIVE BOX
RICKY HAMPTON  MRN-[id number]  Patient is a 66y old  Male who presents with a chief complaint of Anemia, Supratherapeutic INR, Dark Stools (2021 13:33)    HPI:  65M PMH ACC/AHA stage D HF due to NICM HM2 LVAD , TV annuloplasty ring 17 as destination therapy due to severe peripheral artery disease with significant stenosis  SIADH, Depression, CKD-3 with hyperkalemia, past E. coli UTIs, driveline drainage (21) and COVID-19 (back in 2020)  He was recently seen in clinic where he complained of abdominal pain and dark stools w constipation back in May. He presents to [facility] ER today weakness and fatigue, moderate and + Black stools for three days, on coumadin secondary to warfarin use in the setting of an LVAD. Patient has required transfusions for GIB in the past. Mostly recently back in 2021 pt had anemia with dark stools. No interventions was done at that time. However Last Endoscopy was done in 2020 (negative). Today labs show patient is anemic with H/H of 4.5/16.3,. INR is 8.84 MAP in the 90s, Temp 35.1. He denies any chest pain, shortness of breath, dizziness, abd pain, nausea or vomiting.       (2021 16:57)      Surgery/Hospital course:   admit for melena w/ anemia, INR 8.84    Vent setting brought down, fever of 101.7F    Today/Overnight:  Patient with a history of mild rectal bleeding and Chronic Kidney Diseaase, had her vent setting brought down from FiO2 80% to FiO2 40%. She is still on IV Precedex. She has a fever of 101.7 F. Dobutamine was weaned off while IV Heparin was started.     Vital Signs Last 24 Hrs  T(C): 38.1 (2021 16:00), Max: 38.7 (15 Jamil 2021 20:00)  T(F): 100.6 (2021 16:00), Max: 101.7 (15 Jamil 2021 20:00)  HR: 65 (2021 19:00) (65 - 118)  BP: --  BP(mean): --  RR: 21 (2021 19:00) (14 - 37)  SpO2: 100% (2021 19:00) (95% - 100%)  ============================I/O===========================  I&O's Summary    15 Jamil 2021 07:01  -  2021 07:00  --------------------------------------------------------  IN: 4149.4 mL / OUT: 1475 mL / NET: 2674.4 mL    2021 07:01  -  2021 19:36  --------------------------------------------------------  IN: 1124.8 mL / OUT: 1060 mL / NET: 64.8 mL      ============================ LABS =========================                        9.3    18.65 )-----------( 137      ( 2021 18:43 )             29.6     06-16    134<L>  |  100  |  20  ----------------------------<  225<H>  4.0   |  17<L>  |  1.07    Ca    8.1<L>      2021 01:18  Phos  1.7     06-16  Mg     1.7     06-16    TPro  6.4  /  Alb  4.1  /  TBili  0.8  /  DBili  x   /  AST  17  /  ALT  8<L>  /  AlkPhos  50  06-16    LIVER FUNCTIONS - ( 2021 01:18 )  Alb: 4.1 g/dL / Pro: 6.4 g/dL / ALK PHOS: 50 U/L / ALT: 8 U/L / AST: 17 U/L / GGT: x           PT/INR - ( 2021 01:18 )   PT: 14.0 sec;   INR: 1.18 ratio         PTT - ( 2021 18:43 )  PTT:35.8 sec  ABG - ( 2021 18:41 )  pH, Arterial: 7.41  pH, Blood: x     /  pCO2: 33    /  pO2: 81    / HCO3: 20    / Base Excess: -3.5  /  SaO2: 97                Urinalysis Basic - ( 15 Jamil 2021 18:54 )    Color: Light Yellow / Appearance: Clear / S.018 / pH: x  Gluc: x / Ketone: Small  / Bili: Negative / Urobili: Negative   Blood: x / Protein: Trace / Nitrite: Negative   Leuk Esterase: Negative / RBC: 1 /hpf / WBC 0 /HPF   Sq Epi: x / Non Sq Epi: 1 /hpf / Bacteria: Negative      ======================Micro/Rad/Cardio=================  Culture: Reviewed   CXR: Reviewed  Echo: Reviewed  ======================================================  PAST MEDICAL & SURGICAL HISTORY:  CHF (congestive heart failure)    CAD (coronary artery disease)    Depression    Pleural effusion    History of 2019 novel coronavirus disease (COVID-19)  2020    Hemorrhoids    Bleeding hemorrhoids    Peripheral arterial disease    Claudication    BPH with urinary obstruction    ACC/AHA stage D systolic heart failure    Anticoagulation goal of INR 2.0 to 2.5    Falls    Clavicle fracture    CKD (chronic kidney disease), stage III    Iron deficiency anemia    H/O epistaxis    Vertigo    GI bleed    S/P TVR (tricuspid valve repair)    S/P ventricular assist device    S/P endoscopy      ========================ASSESSMENT ================  Stage D Nonischemic Cardiomyopathy, Status Post HM2 on 2017   Recent driveline infection on 2021   acute hypoxemic respiratory failure  aspiration pneumonia  septic shock  cardiogenic shock  GI bleed , Status Post Enteroscopy   Anemia, in setting of melena   Chronic Kidney Disease  Thrombocytopenia   Leukocytosis  Stress Hyperglycemia  coagulopathy   Stress hyperglycemia   Hemodynamic instability   Hypertension     Plan:  ====================== NEUROLOGY=====================  Sedated with IV Precendex and IV Propofol for ventilatory synchrony.     dexMEDEtomidine Infusion 0.5 MICROgram(s)/kG/Hr (9.81 mL/Hr) IV Continuous <Continuous>  propofol Infusion 20 MICROgram(s)/kG/Min (9.42 mL/Hr) IV Continuous <Continuous>    ==================== RESPIRATORY======================  Status post endoscopy, patient tachypneic with persistent cough, CXR suspicious for aspiration, was intubated for respiratory support in setting of concern for sepsis. Respiratory status required full ventilatory support weaned from FiO2 80% to now FiO2 40%, close monitoring of respiratory rate and breathing pattern, the following of ABG’s with A-line monitoring, continuous pulse oximetry monitoring      Mechanical Ventilation:  Mode: AC/ CMV (Assist Control/ Continuous Mandatory Ventilation)  RR (machine): 20  TV (machine): 500  FiO2: 40  PEEP: 10  ITime: 1  MAP: 11  PIP: 27      ====================CARDIOVASCULAR==================  Stage D Nonischemic Cardiomyopathy, Status Post HM2 on 2017; recent driveline infection on 2021. Echo 6/15: severe global left venticular systolic dysfunction with HM2 in place, decreased Right Venticular systolic function, interventricular septums bows slightly to right, otherwise grossly similar to prior. Recent lactate of 1.4. Ventricular Assist Device Implantable setting are: speed of 8800  and flow of 4.3. IV Vasopressin and IV Levophed infusion for vasogenic shock.  IV Dobutamine has been weaned of, instead patient is on IV Epinephrine for inotropic support. Continue Amiodarone for rate control. Invasive hemodynamic monitoring with a central venous catheter & an A-line were required for the continuous central venous and MAP/BP monitoring to ensure adequate cardiovascular support.       aMIOdarone    Tablet 200 milliGRAM(s) Oral daily  EPINEPHrine    Infusion 0.02 MICROgram(s)/kG/Min (5.89 mL/Hr) IV Continuous <Continuous>  norepinephrine Infusion 0.05 MICROgram(s)/kG/Min (7.36 mL/Hr) IV Continuous <Continuous>  vasopressin Infusion 0.033 Unit(s)/Min (2 mL/Hr) IV Continuous <Continuous>    ===================HEMATOLOGIC/ONC ===================  Anemia in setting of melena and supratherapeutic INR of 8.84 on admission. Recent hemoglobin/hematocrit is 9.3/29.6%.  Thromobpcytopenia, recent platelets are 137k. Monitor hemoglobin and hematocrit levels. Heparin continued for venous thromboembolism prophylaxis in addition to sequential compression devices. INR was downtrending but now uptrending to 1.27->1.17->1.18, will continue to monitor closely.     heparin  Infusion 400 Unit(s)/Hr (5 mL/Hr) IV Continuous <Continuous>    ===================== RENAL =========================  Chronic Kidney Disease, Optimize renal perfusion with adequate volume resuscitation and continued monitoring of urine output, fluid balance, BUN/Creatinine.     ==================== GASTROINTESTINAL===================  Abdominal XR : nonobstructive bowel gas pattern and large stool burden. Status post capsule study notable for bleed in small bowel status post balloon endoscopy  notable for old blood in proximal ileum, no acute intervention indicated. NPO, advance diet as tolerated. Protonix for stress ulcer prophylaxis.     pantoprazole  Injectable 40 milliGRAM(s) IV Push every 12 hours  sodium chloride 0.9% lock flush 3 milliLiter(s) IV Push every 8 hours  sodium chloride 0.9%. 1000 milliLiter(s) (10 mL/Hr) IV Continuous <Continuous>    =======================    ENDOCRINE  =====================  Metabolic stability, stress hyperglycemia required an IV regular Insulin drip while following serial glucose levels to help achieve and maintain euglycemia.      dextrose 50% Injectable 50 milliLiter(s) IV Push every 15 minutes  insulin regular Infusion 3 Unit(s)/Hr (3 mL/Hr) IV Continuous <Continuous>      ========================INFECTIOUS DISEASE================  Concern for sepsis +rigors with temp up to 101.0F-> 101.7F, started Zosyn. Leukocytosis, White Blood Cells are at 18.65.    piperacillin/tazobactam IVPB.. 3.375 Gram(s) IV Intermittent every 8 hours      Patient requires continuous monitoring with bedside rhythm monitoring, pulse oximetry monitoring, and continuous central venous and arterial pressure monitoring; and intermittent blood gas analysis.  Care plan discussed with ICU care team.    Patient remained critical, at risk for life threatening decompensation.   I have spent 35 minutes providing acute care with multiple re-evaluations throughout the evening.     By signing my name below, I, Daniela Chowdary , attest that this documentation has been prepared under the direction and in the presence of Aleksandra Marte MD   Electronically signed: Cesia Suresh, 21 @ 19:36    I, Aleksandra Marte, personally performed the services described in this documentation. All medical record entries made by the scribe were at my direction and in my presence. I have reviewed the chart and agree that the record reflects my personal performance and is accurate and complete  Electronically signed: Aleksandra Marte MD 21 @ 19:36       RICKY HAMPTON  MRN-[id number]  Patient is a 66y old  Male who presents with a chief complaint of Anemia, Supratherapeutic INR, Dark Stools (2021 13:33)    HPI:  65M PMH ACC/AHA stage D HF due to NICM HM2 LVAD , TV annuloplasty ring 17 as destination therapy due to severe peripheral artery disease with significant stenosis  SIADH, Depression, CKD-3 with hyperkalemia, past E. coli UTIs, driveline drainage (21) and COVID-19 (back in 2020)  He was recently seen in clinic where he complained of abdominal pain and dark stools w constipation back in May. He presents to [facility] ER today weakness and fatigue, moderate and + Black stools for three days, on coumadin secondary to warfarin use in the setting of an LVAD. Patient has required transfusions for GIB in the past. Mostly recently back in 2021 pt had anemia with dark stools. No interventions was done at that time. However Last Endoscopy was done in 2020 (negative). Today labs show patient is anemic with H/H of 4.5/16.3,. INR is 8.84 MAP in the 90s, Temp 35.1. He denies any chest pain, shortness of breath, dizziness, abd pain, nausea or vomiting.       (2021 16:57)      Surgery/Hospital course:   admit for melena w/ anemia, INR 8.84   6/15 Endoscopy performed after capsule study suggested small bowel bleeding, patient developed hypoxic respiratory failure and SIRS response post EGD requiring intubation, inotropic and vasopressor support.    Today/Overnight:  Ventilator weaned from FiO2 80% to FiO2 40%. She is still on IV Precedex. Dobutamine was weaned off and an IV heparin infusion was started.     Vital Signs Last 24 Hrs  T(C): 38.1 (2021 16:00), Max: 38.7 (15 Jamil 2021 20:00)  T(F): 100.6 (2021 16:00), Max: 101.7 (15 Jamil 2021 20:00)  HR: 65 (2021 19:00) (65 - 118)  BP: --  BP(mean): --  RR: 21 (2021 19:00) (14 - 37)  SpO2: 100% (2021 19:00) (95% - 100%)    ============================I/O===========================  I&O's Summary    15 Jamil 2021 07:01  -  2021 07:00  --------------------------------------------------------  IN: 4149.4 mL / OUT: 1475 mL / NET: 2674.4 mL    2021 07:01  -  2021 19:36  --------------------------------------------------------  IN: 1124.8 mL / OUT: 1060 mL / NET: 64.8 mL      ============================ LABS =========================                        9.3    18.65 )-----------( 137      ( 2021 18:43 )             29.6     06-16    134<L>  |  100  |  20  ----------------------------<  225<H>  4.0   |  17<L>  |  1.07    Ca    8.1<L>      2021 01:18  Phos  1.7     06-16  Mg     1.7     06-16    TPro  6.4  /  Alb  4.1  /  TBili  0.8  /  DBili  x   /  AST  17  /  ALT  8<L>  /  AlkPhos  50  06-16    LIVER FUNCTIONS - ( 2021 01:18 )  Alb: 4.1 g/dL / Pro: 6.4 g/dL / ALK PHOS: 50 U/L / ALT: 8 U/L / AST: 17 U/L / GGT: x           PT/INR - ( 2021 01:18 )   PT: 14.0 sec;   INR: 1.18 ratio         PTT - ( 2021 18:43 )  PTT:35.8 sec  ABG - ( 2021 18:41 )  pH, Arterial: 7.41  pH, Blood: x     /  pCO2: 33    /  pO2: 81    / HCO3: 20    / Base Excess: -3.5  /  SaO2: 97                Urinalysis Basic - ( 15 Jamil 2021 18:54 )    Color: Light Yellow / Appearance: Clear / S.018 / pH: x  Gluc: x / Ketone: Small  / Bili: Negative / Urobili: Negative   Blood: x / Protein: Trace / Nitrite: Negative   Leuk Esterase: Negative / RBC: 1 /hpf / WBC 0 /HPF   Sq Epi: x / Non Sq Epi: 1 /hpf / Bacteria: Negative      ======================Micro/Rad/Cardio=================  Culture: Reviewed   CXR: Reviewed  Echo: Reviewed  ======================================================  PAST MEDICAL & SURGICAL HISTORY:  CHF (congestive heart failure)    CAD (coronary artery disease)    Depression    Pleural effusion    History of 2019 novel coronavirus disease (COVID-19)  2020    Hemorrhoids    Bleeding hemorrhoids    Peripheral arterial disease    Claudication    BPH with urinary obstruction    ACC/AHA stage D systolic heart failure    Anticoagulation goal of INR 2.0 to 2.5    Falls    Clavicle fracture    CKD (chronic kidney disease), stage III    Iron deficiency anemia    H/O epistaxis    Vertigo    GI bleed    S/P TVR (tricuspid valve repair)    S/P ventricular assist device    S/P endoscopy      ========================ASSESSMENT ================  Stage D Nonischemic Cardiomyopathy, Status Post HM2 on 2017   Recent driveline infection on 2021   acute hypoxemic respiratory failure  aspiration pneumonia  septic shock  cardiogenic shock  GI bleed , Status Post Enteroscopy   Anemia, in setting of melena   Chronic Kidney Disease  Thrombocytopenia   Leukocytosis  Stress Hyperglycemia  coagulopathy   Stress hyperglycemia   Hemodynamic instability   Hypertension     Plan:  ====================== NEUROLOGY=====================  Sedated with IV Precendex and IV Propofol for ventilatory synchrony.     dexMEDEtomidine Infusion 0.5 MICROgram(s)/kG/Hr (9.81 mL/Hr) IV Continuous <Continuous>  propofol Infusion 20 MICROgram(s)/kG/Min (9.42 mL/Hr) IV Continuous <Continuous>    ==================== RESPIRATORY======================  Status post endoscopy, patient tachypneic with persistent cough, CXR suspicious for aspiration, patient was intubated for respiratory distress and hypoxia. Respiratory status required full ventilatory support, FiO2 weaned from 80% to now FiO2 40%, close monitoring of respiratory rate and breathing pattern, the following of ABG’s with A-line monitoring, and continuous pulse oximetry monitoring.      Mechanical Ventilation:  Mode: AC/ CMV (Assist Control/ Continuous Mandatory Ventilation)  RR (machine): 20  TV (machine): 500  FiO2: 40  PEEP: 10  ITime: 1  MAP: 11  PIP: 27      ====================CARDIOVASCULAR==================  Stage D Nonischemic Cardiomyopathy, Status Post HM2 on 2017; recent driveline infection on 2021. Echo 6/15: severe global left venticular systolic dysfunction with HM2 in place, decreased Right Venticular systolic function, interventricular septums bows slightly to right, otherwise grossly similar to prior. Recent lactate of 1.4. Ventricular Assist Device Implantable setting are: speed of 8800  and flow of 4.3. IV Vasopressin and IV Levophed infusion for vasogenic shock.  IV Dobutamine has been weaned of, instead patient is on IV Epinephrine for inotropic support. Continue Amiodarone for rate control. Invasive hemodynamic monitoring with a central venous catheter & an A-line were required for the continuous central venous and MAP/BP monitoring to ensure adequate cardiovascular support.       aMIOdarone    Tablet 200 milliGRAM(s) Oral daily  EPINEPHrine    Infusion 0.02 MICROgram(s)/kG/Min (5.89 mL/Hr) IV Continuous <Continuous>  norepinephrine Infusion 0.05 MICROgram(s)/kG/Min (7.36 mL/Hr) IV Continuous <Continuous>  vasopressin Infusion 0.033 Unit(s)/Min (2 mL/Hr) IV Continuous <Continuous>    ===================HEMATOLOGIC/ONC ===================  Anemia in setting of melena and supratherapeutic INR of 8.84 on admission. Recent hemoglobin/hematocrit is 9.3/29.6%.  Thromobpcytopenia, recent platelets are 137k. Monitor hemoglobin and hematocrit levels. Heparin continued for venous thromboembolism prophylaxis in addition to sequential compression devices. INR was downtrending but now uptrending to 1.27->1.17->1.18, will continue to monitor closely.     heparin  Infusion 400 Unit(s)/Hr (5 mL/Hr) IV Continuous <Continuous>    ===================== RENAL =========================  Chronic Kidney Disease, Optimize renal perfusion with adequate volume resuscitation and continued monitoring of urine output, fluid balance, BUN/Creatinine.     ==================== GASTROINTESTINAL===================  Abdominal XR : nonobstructive bowel gas pattern and large stool burden. Status post capsule study notable for bleed in small bowel status post balloon endoscopy  notable for old blood in proximal ileum, no acute intervention indicated. NPO, advance diet as tolerated. Protonix for stress ulcer prophylaxis.     pantoprazole  Injectable 40 milliGRAM(s) IV Push every 12 hours  sodium chloride 0.9% lock flush 3 milliLiter(s) IV Push every 8 hours  sodium chloride 0.9%. 1000 milliLiter(s) (10 mL/Hr) IV Continuous <Continuous>    =======================    ENDOCRINE  =====================  Metabolic stability, stress hyperglycemia required an IV regular Insulin drip while following serial glucose levels to help achieve and maintain euglycemia.      dextrose 50% Injectable 50 milliLiter(s) IV Push every 15 minutes  insulin regular Infusion 3 Unit(s)/Hr (3 mL/Hr) IV Continuous <Continuous>      ========================INFECTIOUS DISEASE================  Concern for sepsis +rigors with temp up to 101.0F-> 101.7F, started Zosyn. Leukocytosis, White Blood Cells are at 18.65.    piperacillin/tazobactam IVPB.. 3.375 Gram(s) IV Intermittent every 8 hours      Patient requires continuous monitoring with bedside rhythm monitoring, pulse oximetry monitoring, and continuous central venous and arterial pressure monitoring; and intermittent blood gas analysis.  Care plan discussed with ICU care team.    Patient remained critical, at risk for life threatening decompensation.   I have spent 35 minutes providing acute care with multiple re-evaluations throughout the evening.     By signing my name below, I, Daniela Chowdary , attest that this documentation has been prepared under the direction and in the presence of Aleksandra Marte MD   Electronically signed: Cesia Suresh, 21 @ 19:36    I, Aleksandra Marte, personally performed the services described in this documentation. All medical record entries made by the scribe were at my direction and in my presence. I have reviewed the chart and agree that the record reflects my personal performance and is accurate and complete  Electronically signed: Aleksandra Marte MD 21 @ 19:36       RICKY HAMPTON  MRN-[id number]  Patient is a 66y old  Male who presents with a chief complaint of Anemia, Supratherapeutic INR, Dark Stools (2021 13:33)    HPI:  65M PMH ACC/AHA stage D HF due to NICM HM2 LVAD , TV annuloplasty ring 17 as destination therapy due to severe peripheral artery disease with significant stenosis  SIADH, Depression, CKD-3 with hyperkalemia, past E. coli UTIs, driveline drainage (21) and COVID-19 (back in 2020)  He was recently seen in clinic where he complained of abdominal pain and dark stools w constipation back in May. He presents to [facility] ER today weakness and fatigue, moderate and + Black stools for three days, on coumadin secondary to warfarin use in the setting of an LVAD. Patient has required transfusions for GIB in the past. Mostly recently back in 2021 pt had anemia with dark stools. No interventions was done at that time. However Last Endoscopy was done in 2020 (negative). Today labs show patient is anemic with H/H of 4.5/16.3,. INR is 8.84 MAP in the 90s, Temp 35.1. He denies any chest pain, shortness of breath, dizziness, abd pain, nausea or vomiting.       (2021 16:57)      Surgery/Hospital course:   admit for melena w/ anemia, INR 8.84   6/15 Endoscopy performed after capsule study suggested small bowel bleeding, patient developed hypoxic respiratory failure and SIRS response post EGD requiring intubation, inotropic and vasopressor support.    Today/Overnight:  Ventilator weaned from FiO2 80% to FiO2 40%. She is still on IV Precedex. Dobutamine was weaned off and an IV heparin infusion was started.     Vital Signs Last 24 Hrs  T(C): 38.1 (2021 16:00), Max: 38.7 (15 Jamil 2021 20:00)  T(F): 100.6 (2021 16:00), Max: 101.7 (15 Jamil 2021 20:00)  HR: 65 (2021 19:00) (65 - 118)  BP: --  BP(mean): --  RR: 21 (2021 19:00) (14 - 37)  SpO2: 100% (2021 19:00) (95% - 100%)    ============================I/O===========================  I&O's Summary    15 Jamil 2021 07:01  -  2021 07:00  --------------------------------------------------------  IN: 4149.4 mL / OUT: 1475 mL / NET: 2674.4 mL    2021 07:01  -  2021 19:36  --------------------------------------------------------  IN: 1124.8 mL / OUT: 1060 mL / NET: 64.8 mL      ============================ LABS =========================                        9.3    18.65 )-----------( 137      ( 2021 18:43 )             29.6     06-16    134<L>  |  100  |  20  ----------------------------<  225<H>  4.0   |  17<L>  |  1.07    Ca    8.1<L>      2021 01:18  Phos  1.7     06-16  Mg     1.7     06-16    TPro  6.4  /  Alb  4.1  /  TBili  0.8  /  DBili  x   /  AST  17  /  ALT  8<L>  /  AlkPhos  50  06-16    LIVER FUNCTIONS - ( 2021 01:18 )  Alb: 4.1 g/dL / Pro: 6.4 g/dL / ALK PHOS: 50 U/L / ALT: 8 U/L / AST: 17 U/L / GGT: x           PT/INR - ( 2021 01:18 )   PT: 14.0 sec;   INR: 1.18 ratio         PTT - ( 2021 18:43 )  PTT:35.8 sec  ABG - ( 2021 18:41 )  pH, Arterial: 7.41  pH, Blood: x     /  pCO2: 33    /  pO2: 81    / HCO3: 20    / Base Excess: -3.5  /  SaO2: 97          Urinalysis Basic - ( 15 Jamil 2021 18:54 )    Color: Light Yellow / Appearance: Clear / S.018 / pH: x  Gluc: x / Ketone: Small  / Bili: Negative / Urobili: Negative   Blood: x / Protein: Trace / Nitrite: Negative   Leuk Esterase: Negative / RBC: 1 /hpf / WBC 0 /HPF   Sq Epi: x / Non Sq Epi: 1 /hpf / Bacteria: Negative      ======================Micro/Rad/Cardio=================  Culture: Reviewed   CXR: Reviewed  Echo: Reviewed  ======================================================  PAST MEDICAL & SURGICAL HISTORY:  CHF (congestive heart failure)    CAD (coronary artery disease)    Depression    Pleural effusion    History of 2019 novel coronavirus disease (COVID-19)  2020    Hemorrhoids    Bleeding hemorrhoids    Peripheral arterial disease    Claudication    BPH with urinary obstruction    ACC/AHA stage D systolic heart failure    Anticoagulation goal of INR 2.0 to 2.5    Falls    Clavicle fracture    CKD (chronic kidney disease), stage III    Iron deficiency anemia    H/O epistaxis    Vertigo    GI bleed    S/P TVR (tricuspid valve repair)    S/P ventricular assist device    S/P endoscopy      ========================ASSESSMENT ================  Stage D Nonischemic Cardiomyopathy, Status Post HM2 on 2017   Recent driveline infection on 2021   acute hypoxemic respiratory failure  aspiration pneumonia  septic shock  cardiogenic shock  GI bleed , Status Post Enteroscopy   Anemia, in setting of melena   Chronic Kidney Disease  Thrombocytopenia   Leukocytosis  Stress Hyperglycemia  coagulopathy   Stress hyperglycemia   Hemodynamic instability   Hypertension     Plan:  ====================== NEUROLOGY=====================  Sedated with IV Precendex and IV Propofol for ventilatory synchrony.     dexMEDEtomidine Infusion 0.5 MICROgram(s)/kG/Hr (9.81 mL/Hr) IV Continuous <Continuous>  propofol Infusion 20 MICROgram(s)/kG/Min (9.42 mL/Hr) IV Continuous <Continuous>    ==================== RESPIRATORY======================  Status post endoscopy, patient tachypneic with persistent cough, CXR suspicious for aspiration, patient was intubated for respiratory distress and hypoxia. Respiratory status required full ventilatory support, FiO2 weaned from 80% to now FiO2 40%, close monitoring of respiratory rate and breathing pattern, the following of ABG’s with A-line monitoring, and continuous pulse oximetry monitoring.      Mechanical Ventilation:  Mode: AC/ CMV (Assist Control/ Continuous Mandatory Ventilation)  RR (machine): 20  TV (machine): 500  FiO2: 40  PEEP: 10  ITime: 1  MAP: 11  PIP: 27      ====================CARDIOVASCULAR==================  Stage D Nonischemic Cardiomyopathy, Status Post HM2 on 2017; recent driveline infection on 2021. Echo 6/15: severe global left venticular systolic dysfunction with HM2 in place, decreased Right Venticular systolic function, interventricular septums bows slightly to right, otherwise grossly similar to prior. Recent lactate of 1.4. Ventricular Assist Device Implantable setting are: speed of 8800  and flow of 4.3. IV Vasopressin and IV Levophed infusions titrated for vasogenic shock.  IV Dobutamine has been weaned of, instead patient is on IV Epinephrine for inotropic support due to ongoing shock and RV dysfunction. Continue Amiodarone for rate and rhythm control. Invasive hemodynamic monitoring with a central venous catheter & an A-line were required for the continuous central venous and MAP/BP monitoring to ensure adequate cardiovascular support.       aMIOdarone    Tablet 200 milliGRAM(s) Oral daily  EPINEPHrine    Infusion 0.02 MICROgram(s)/kG/Min (5.89 mL/Hr) IV Continuous <Continuous>  norepinephrine Infusion 0.05 MICROgram(s)/kG/Min (7.36 mL/Hr) IV Continuous <Continuous>  vasopressin Infusion 0.033 Unit(s)/Min (2 mL/Hr) IV Continuous <Continuous>    ===================HEMATOLOGIC/ONC ===================  Anemia in setting of melena and supratherapeutic INR of 8.84 on admission. Recent hemoglobin/hematocrit is 9.3/29.6%. Hematocrit has been stable throughout the past 24 hours. Thrombocytopenia, recent platelets are 137k. Monitor hemoglobin and hematocrit levels. Heparin initiated for LVAD with lower PTT goal. INR now corrected to 1.18. Heparin will also cover for venous thromboembolism prophylaxis in addition to sequential compression devices.   heparin  Infusion 500 Unit(s)/Hr (5 mL/Hr) IV Continuous <Continuous>    ===================== RENAL =========================  Chronic Kidney Disease, Optimize renal perfusion with adequate volume resuscitation and continued monitoring of urine output, fluid balance, BUN/Creatinine.     ==================== GASTROINTESTINAL===================  Abdominal XR : nonobstructive bowel gas pattern and large stool burden. Status post capsule study notable for  bleed in small bowel and subsequently found to have old blood in proximal ileum on balloon endoscopy , no acute intervention indicated. NPO, advance diet as tolerated. Protonix for stress ulcer prophylaxis. Initial concern for possible perforation of the esophagus during EGD. Patient has been stabilizing, but will consider CT scan to evaluate if patient continues with fever and SIRS.    pantoprazole  Injectable 40 milliGRAM(s) IV Push every 12 hours  sodium chloride 0.9% lock flush 3 milliLiter(s) IV Push every 8 hours  sodium chloride 0.9%. 1000 milliLiter(s) (10 mL/Hr) IV Continuous <Continuous>    =======================    ENDOCRINE  =====================  Metabolic stability, stress hyperglycemia required an IV regular Insulin drip while following serial glucose levels to help achieve and maintain euglycemia.      dextrose 50% Injectable 50 milliLiter(s) IV Push every 15 minutes  insulin regular Infusion 3 Unit(s)/Hr (3 mL/Hr) IV Continuous <Continuous>      ========================INFECTIOUS DISEASE================  Concern for sepsis +rigors with temp up to 101.0F-> 101.7F, started Zosyn. Leukocytosis, White Blood Cells are at 18.65.    piperacillin/tazobactam IVPB.. 3.375 Gram(s) IV Intermittent every 8 hours      Patient requires continuous monitoring with bedside rhythm monitoring, pulse oximetry monitoring, and continuous central venous and arterial pressure monitoring; and intermittent blood gas analysis.  Care plan discussed with ICU care team.    Patient remained critical, at risk for life threatening decompensation.   I have spent 40 minutes providing acute care with multiple re-evaluations throughout the evening.     By signing my name below, I, Daniela Chowdary , attest that this documentation has been prepared under the direction and in the presence of Aleksandra Marte MD   Electronically signed: Cesia Suresh, 21 @ 19:36    I, Aleksandra Marte, personally performed the services described in this documentation. All medical record entries made by the scribe were at my direction and in my presence. I have reviewed the chart and agree that the record reflects my personal performance and is accurate and complete  Electronically signed: Aleksandra Marte MD 21 @ 19:36

## 2021-06-16 NOTE — PROGRESS NOTE ADULT - ATTENDING COMMENTS
Patient seen and examined   Case discussed with Dr Meza  I agree with his interval history and exam and plans as noted above    Patient remains intubated, with copious secretions from ET tube  leukocytosis  blood cultures pending  sputum with mixed oral cornelia on culture  MRSA PCR nasal swab negative    Suspected aspiration event at time of endoscopy- continue Zosyn  follow up blood cultures    Morris Prater MD  [phone]  After 5pm/weekends [phone]

## 2021-06-16 NOTE — PROGRESS NOTE ADULT - SUBJECTIVE AND OBJECTIVE BOX
CRITICAL CARE ATTENDING - CTICU    MEDICATIONS  (STANDING):  aMIOdarone    Tablet 200 milliGRAM(s) Oral daily  chlorhexidine 0.12% Liquid 15 milliLiter(s) Oral Mucosa every 12 hours  chlorhexidine 2% Cloths 1 Application(s) Topical <User Schedule>  dexMEDEtomidine Infusion 0.5 MICROgram(s)/kG/Hr (9.81 mL/Hr) IV Continuous <Continuous>  dextrose 50% Injectable 50 milliLiter(s) IV Push every 15 minutes  DOBUTamine Infusion 4 MICROgram(s)/kG/Min (9.42 mL/Hr) IV Continuous <Continuous>  EPINEPHrine    Infusion 0.05 MICROgram(s)/kG/Min (14.7 mL/Hr) IV Continuous <Continuous>  fentaNYL   Infusion... 0.5 MICROgram(s)/kG/Hr (1.96 mL/Hr) IV Continuous <Continuous>  insulin lispro (ADMELOG) corrective regimen sliding scale   SubCutaneous every 6 hours  insulin regular Infusion 3 Unit(s)/Hr (3 mL/Hr) IV Continuous <Continuous>  norepinephrine Infusion 0.05 MICROgram(s)/kG/Min (7.36 mL/Hr) IV Continuous <Continuous>  pantoprazole  Injectable 40 milliGRAM(s) IV Push every 12 hours  piperacillin/tazobactam IVPB.. 3.375 Gram(s) IV Intermittent every 8 hours  propofol Infusion 20 MICROgram(s)/kG/Min (9.42 mL/Hr) IV Continuous <Continuous>  sodium chloride 0.9% lock flush 3 milliLiter(s) IV Push every 8 hours  sodium chloride 0.9%. 1000 milliLiter(s) (10 mL/Hr) IV Continuous <Continuous>  vasopressin Infusion 0.033 Unit(s)/Min (2 mL/Hr) IV Continuous <Continuous>                                    8.4    12.67 )-----------( 111      ( 2021 04:18 )             26.6       06-16    134<L>  |  100  |  20  ----------------------------<  225<H>  4.0   |  17<L>  |  1.07    Ca    8.1<L>      2021 01:18  Phos  1.7     06-16  Mg     1.7     06-16    TPro  6.4  /  Alb  4.1  /  TBili  0.8  /  DBili  x   /  AST  17  /  ALT  8<L>  /  AlkPhos  50  06-16      PT/INR - ( 2021 01:18 )   PT: 14.0 sec;   INR: 1.18 ratio         PTT - ( 2021 01:18 )  PTT:21.2 sec    Mode: AC/ CMV (Assist Control/ Continuous Mandatory Ventilation)  RR (machine): 16  TV (machine): 450  FiO2: 100  PEEP: 10  ITime: 1  MAP: 12  PIP: 28      Daily Height in cm: 177.8 (15 Jamil 2021 12:14)    Daily Weight in k.4 (2021 00:00)      06-15 @ 07:01  -  -16 @ 07:00  --------------------------------------------------------  IN: 4149.4 mL / OUT: 1475 mL / NET: 2674.4 mL        Critically Ill patient  : [ ] preoperative ,   [] post operative [x] Non operative     Requires :  [x] Arterial Line   [x] Central Line  [ ] PA catheter  [ ] IABP  [ ] ECMO  [ ] LVAD  [x] Ventilator  [ ] pacemaker [ ] Impella.                      [x] ABG's     [x] Pulse Oxymetry Monitoring  Bedside evaluation , monitoring , treatment of hemodynamics , fluids , IVP/ IVCD meds.        Diagnosis:     GI Bleeding     LVAD Patient     Hypotension     Hypovolemia     CHF- acute [x]   chronic [x]    systolic [x]   diatolic [ ]          - Echo- EF -  BIV dyfunction           [x] Moderate RV dysfunction          - Cxr-cardiomegally, edema          - Clinical-  [x]inotropes   [x]pressors   [ ]diuresis   [ ]IABP   [ ]ECMO   [ ]LVAD   [ ]Respiratory Failure.     Hemodynamic lability,  instability. Requires IVCD [x] vasopressors [x] inotropes  [ ] vasodilator  [x]IVSS fluid  to maintain MAP, perfusion, C.I.     Ventilator Management:  [x]AC-rest    [x]CPAP-PS Wean    [ ]Trach Collar     [ ]Extubate    [ ] T-Piece  [ ]peep>5     IVCD Precedex and Propofol for Vent Synchrony     Thrombocytopenia     Hyponatremia     Melena     FFP     Metabolic Acidosis     Pill study - c/w small bowel bleeding focus     Requires chest PT, pulmonary toilet, ambu bagging, suctioning to maintain SaO2,  patent airway and treat atelectasis.     Requires bedside physical therapy, mobilization and total senior living care.                         By signing my name below, I, Rojas Junior, attest that this documentation has been prepared under the direction and in the presence of Luis Fernando Thompson MD.   Electronically Signed: Cesia Cohen 21 @ 07:14      Discussed with CT surgeon, Physician Assistant - Nurse Practitioner- Critical care medicine team.   Dicussed at  AM / PM rounds.   Chart, labs , films reviewed.    Cumulative Critical Care Time Given Today:  CRITICAL CARE ATTENDING - CTICU    MEDICATIONS  (STANDING):  aMIOdarone    Tablet 200 milliGRAM(s) Oral daily  chlorhexidine 0.12% Liquid 15 milliLiter(s) Oral Mucosa every 12 hours  chlorhexidine 2% Cloths 1 Application(s) Topical <User Schedule>  dexMEDEtomidine Infusion 0.5 MICROgram(s)/kG/Hr (9.81 mL/Hr) IV Continuous <Continuous>  dextrose 50% Injectable 50 milliLiter(s) IV Push every 15 minutes  DOBUTamine Infusion 4 MICROgram(s)/kG/Min (9.42 mL/Hr) IV Continuous <Continuous>  EPINEPHrine    Infusion 0.05 MICROgram(s)/kG/Min (14.7 mL/Hr) IV Continuous <Continuous>  fentaNYL   Infusion... 0.5 MICROgram(s)/kG/Hr (1.96 mL/Hr) IV Continuous <Continuous>  insulin lispro (ADMELOG) corrective regimen sliding scale   SubCutaneous every 6 hours  insulin regular Infusion 3 Unit(s)/Hr (3 mL/Hr) IV Continuous <Continuous>  norepinephrine Infusion 0.05 MICROgram(s)/kG/Min (7.36 mL/Hr) IV Continuous <Continuous>  pantoprazole  Injectable 40 milliGRAM(s) IV Push every 12 hours  piperacillin/tazobactam IVPB.. 3.375 Gram(s) IV Intermittent every 8 hours  propofol Infusion 20 MICROgram(s)/kG/Min (9.42 mL/Hr) IV Continuous <Continuous>  sodium chloride 0.9% lock flush 3 milliLiter(s) IV Push every 8 hours  sodium chloride 0.9%. 1000 milliLiter(s) (10 mL/Hr) IV Continuous <Continuous>  vasopressin Infusion 0.033 Unit(s)/Min (2 mL/Hr) IV Continuous <Continuous>                                    8.4    12.67 )-----------( 111      ( 2021 04:18 )             26.6       06-16    134<L>  |  100  |  20  ----------------------------<  225<H>  4.0   |  17<L>  |  1.07    Ca    8.1<L>      2021 01:18  Phos  1.7     06-16  Mg     1.7     06-16    TPro  6.4  /  Alb  4.1  /  TBili  0.8  /  DBili  x   /  AST  17  /  ALT  8<L>  /  AlkPhos  50  06-16      PT/INR - ( 2021 01:18 )   PT: 14.0 sec;   INR: 1.18 ratio         PTT - ( 2021 01:18 )  PTT:21.2 sec    Mode: AC/ CMV (Assist Control/ Continuous Mandatory Ventilation)  RR (machine): 16  TV (machine): 450  FiO2: 100  PEEP: 10  ITime: 1  MAP: 12  PIP: 28      Daily Height in cm: 177.8 (15 Jamil 2021 12:14)    Daily Weight in k.4 (2021 00:00)      06-15 @ 07:01  -  -16 @ 07:00  --------------------------------------------------------  IN: 4149.4 mL / OUT: 1475 mL / NET: 2674.4 mL        Critically Ill patient  : [ ] preoperative ,   [] post operative [x] Non operative     Requires :  [x] Arterial Line   [x] Central Line  [ ] PA catheter  [ ] IABP  [ ] ECMO  [ ] LVAD  [x] Ventilator  [ ] pacemaker [ ] Impella.                      [x] ABG's     [x] Pulse Oxymetry Monitoring  Bedside evaluation , monitoring , treatment of hemodynamics , fluids , IVP/ IVCD meds.        Diagnosis:     GI Bleeding     LVAD Patient     Hypotension     Hypovolemia     CHF- acute [x]   chronic [x]    systolic [x]   diatolic [ ]          - Echo- EF -  BIV dyfunction           [x] Moderate RV dysfunction          - Cxr-cardiomegally, edema          - Clinical-  [x]inotropes   [x]pressors   [ ]diuresis   [ ]IABP   [ ]ECMO   [ ]LVAD   [ ]Respiratory Failure.     Hemodynamic lability,  instability. Requires IVCD [x] vasopressors [x] inotropes  [ ] vasodilator  [x]IVSS fluid  to maintain MAP, perfusion, C.I.     respiratory failure - ARDS    Aspiration at endoscopy yesterday    Ventilator Management:  [x]AC-rest    []CPAP-PS Wean    [ ]Trach Collar     [ ]Extubate    [ ] T-Piece  [x ]peep>5     Lung Protective Management    IVCD Precedex and Propofol for Vent Synchrony     Thrombocytopenia     Hyponatremia     Melena     FFP     Metabolic Acidosis     Pill study - c/w small bowel bleeding focus     Requires chest PT, pulmonary toilet, ambu bagging, suctioning to maintain SaO2,  patent airway and treat atelectasis.     Requires bedside physical therapy, mobilization and total [facility] care.     Sepsis - L Pneumonia / pneumonitis    Septic Shock    Tolerates NG / NJ feeds at [ ] goal rate    [ x] trophic rate    [ ]       rate                         By signing my name below, I, Rojas Junior, attest that this documentation has been prepared under the direction and in the presence of Luis Fernando Thompson MD.   Electronically Signed: Cesia Cohen 16- @ 07:14    I, Luis Fernando Thompson, personally performed the services described in this documentation. All medical record entries made by the scribe were at my direction and in my presence. I have reviewed the chart and agree that the record reflects my personal performance and is accurate and complete.   Luis Fernando Thompson MD.       Discussed with CT surgeon, Physician Assistant - Nurse Practitioner- Critical care medicine team.   Dicussed at  AM / PM rounds.   Chart, labs , films reviewed.    Cumulative Critical Care Time Given Today: 90 min

## 2021-06-16 NOTE — PROGRESS NOTE ADULT - ASSESSMENT
65M PMH ACC/AHA stage D HF due to NICM HM2 LVAD, TV annuloplasty ring 9/12/17 as destination therapy due to severe peripheral artery disease with significant stenosis SIADH, Depression, CKD-3 with hyperkalemia, past E. coli UTIs, driveline drainage (1/7/21) and COVID-19 (back in April 2020)  He was recently seen in clinic where he complained of abdominal pain and dark stools w constipation back in May. He presents to [facility] ER today weakness and fatigue, moderate and + Black stools for three days, on coumadin secondary to warfarin use in the setting of an LVAD.  Patient has required transfusions for GIB in the past. Mostly recently back in jan 2021 pt had anemia with dark stools. No interventions was done at that time. However Last Endoscopy was done in July 2020 (negative). Today labs show patient is anemic with H/H of 4.5/16.3,. INR is 8.84 MAP in the 90s, Returned from endoscopy appearing ill tachypneic with chills with new white out of his left lung    #Sepsis:  - Aspiration event vs esophageal tear at time of procedure. CXR shows LLL PNA  - Will need CT scan of chest/abd/pelvis once he stabilizes to see if perforation/tear  - Follow up sputum culture and BCx  - c/w Zosyn 3.375g q8h (6/16-)    Jelani Meza MD, PGY4   ID fellow  Pager: [phone]  [facility] pager ID: [phone] (would prefer to text page for any new consult or question, please include name/location and best call back number)  After 5pm/weekends call [phone]

## 2021-06-16 NOTE — PROGRESS NOTE ADULT - ATTENDING COMMENTS
Patient seen and examined, agree with above. He likely has transient angioectasia that come and go especially when he is exposed to supertherapeutic anticoagulation, which would explain the cessation of bleeding once INR is resolved and the intermittently negative capsule endoscopy studies so far. His bleeding is now resolved with correction of INR, ok to resume A/C as needed for LVAD. Avoid overshooting PTT/INR.

## 2021-06-16 NOTE — DIETITIAN INITIAL EVALUATION ADULT. - CHIEF COMPLAINT
65 year old male with PMHx of NICM, s.p  HM2 LVAD in 2017, on coumadin, CKD 3 presents with anemia, dark stools and supra therapeutic  INR.  S/p capsule study, and s/p endoscopy. Course c/b possible aspiration event, Pt now intubated, sedated on pressors.

## 2021-06-16 NOTE — DIETITIAN INITIAL EVALUATION ADULT. - OTHER INFO
Pt intubated, sedated. Limited subjective information collected at this time.   NPO x2 days     NGT out put today; 250ml   last BM unknown at this time.     Diet and weight history PTA unknown.   In September 2020,Pt was 157lbs. Pt admitted now at 170lbs. ? Weight gain vs fluid retention. Will continue to trend weights.    No known micronutrient supplements. NKFA

## 2021-06-16 NOTE — PROGRESS NOTE ADULT - PROBLEM SELECTOR PLAN 4
- Continue with current speed (8800 rpm), if further low flow alarms will increase LVAD speed  - OK to restart heparin but would target lower PTT goal at this time. Eventual resumption of warfarin   - Will plan to hold aspirin indefinitiely  - Continue to monitor LDH daily.

## 2021-06-16 NOTE — DIETITIAN INITIAL EVALUATION ADULT. - ENTERAL
When and if medically feasible, would initiate Vital AF 1.2 @ 10ml/hr and increase by 10m/hr q4-6hrs as tolerated by Pt to goal rate of Vital AF 1.2 @ 55m/cbt61hjo. Goal rate will provide 1584kclas and 99gm protein (20kcals/kg and 1.26gm pro/kg based on dosing weight 78.5kg). Of note, Propofol infusing at 11.8ml/hr. At this rate, propofol will provide an addition 311kcals from lipids.

## 2021-06-16 NOTE — PROGRESS NOTE ADULT - PROBLEM SELECTOR PLAN 3
- no further active blood loss since correction of INR and no active bleeding seen on enteroscopy  - continue to trend CBC closely as anticoagulation re-initiated

## 2021-06-16 NOTE — PROGRESS NOTE ADULT - ASSESSMENT
64 yo male with PMH of AHA stage D HF 2/2 NICM with HM2 LVAD (on coumadin) as destination therapy due to severe PAD, SIADH who presents with abd pain and dark stools, found to have a supratherapeutic INR and anemia.     Impression:  # Anemia - patient reports black stool however formed and hard not consistent with bleed; minimal dark brown stool on exam; hgb low compared to baseline (baseline 7-8, presented w/ 4.5). On previous admissions (1/2021) Hematology reviewed smear consistent with LVAD changes. Now s/p + capsul;e but negative single balloon with blood in NGT  # LVAD on AC - supratherapeutic INR    Recommendation:  - trend CBCq12 hours and transfuse for goal >8  - supportive care per primary  - active Type and screen  - rest of care per primary team    Aleksandra Olivier  Gastroenterology Fellow  Pager x [phone] or [phone]  Please page on-call Fellow on weekends/holidays or after 5 pm and before 8 am on weekdays   On-call GI fellow can be contacted via the [employer] service ([phone]) 64 yo male with PMH of AHA stage D HF 2/2 NICM with HM2 LVAD (on coumadin) as destination therapy due to severe PAD, SIADH who presents with abd pain and dark stools, found to have a supratherapeutic INR and anemia.     Impression:  # Anemia - patient reports black stool however formed and hard not consistent with bleed; minimal dark brown stool on exam; hgb low compared to baseline (baseline 7-8, presented w/ 4.5). On previous admissions (1/2021) Hematology reviewed smear consistent with LVAD changes. Now s/p + capsul;e but negative single balloon with blood in NGT  # LVAD on AC - supratherapeutic INR    Recommendation:  - trend CBCq12 hours and transfuse for goal >8  - supportive care per primary  - active Type and screen  - ok to resume anticoagulation; avoid supratherapeutic anticoagulation  - rest of care per primary team    Aleksandra Olivier  Gastroenterology Fellow  Pager x [phone] or [phone]  Please page on-call Fellow on weekends/holidays or after 5 pm and before 8 am on weekdays   On-call GI fellow can be contacted via the [employer] service ([phone])

## 2021-06-16 NOTE — PROGRESS NOTE ADULT - PROBLEM SELECTOR PLAN 5
- Wean dobutamine to off, hemodynamics suggestive of distributive shock   - Holding home metoprolol/lisinopril in setting of shock   - Continue Amio 200 mg QD

## 2021-06-16 NOTE — PROGRESS NOTE ADULT - SUBJECTIVE AND OBJECTIVE BOX
Chief Complaint:  Patient is a 66y old  Male who presents with a chief complaint of Anemia, Supratherapeutic INR, Dark Stools (2021 07:14)      Interval Events:   Patient had single balloon yesterday, negative.  Blood in NGT output.  Patient remains intubated.    Allergies:  No Known Allergies      Hospital Medications:  aMIOdarone    Tablet 200 milliGRAM(s) Oral daily  chlorhexidine 0.12% Liquid 15 milliLiter(s) Oral Mucosa every 12 hours  chlorhexidine 2% Cloths 1 Application(s) Topical <User Schedule>  dexMEDEtomidine Infusion 0.5 MICROgram(s)/kG/Hr IV Continuous <Continuous>  dextrose 50% Injectable 50 milliLiter(s) IV Push every 15 minutes  DOBUTamine Infusion 4 MICROgram(s)/kG/Min IV Continuous <Continuous>  EPINEPHrine    Infusion 0.02 MICROgram(s)/kG/Min IV Continuous <Continuous>  insulin lispro (ADMELOG) corrective regimen sliding scale   SubCutaneous every 6 hours  insulin regular Infusion 3 Unit(s)/Hr IV Continuous <Continuous>  norepinephrine Infusion 0.05 MICROgram(s)/kG/Min IV Continuous <Continuous>  pantoprazole  Injectable 40 milliGRAM(s) IV Push every 12 hours  piperacillin/tazobactam IVPB.. 3.375 Gram(s) IV Intermittent every 8 hours  propofol Infusion 20 MICROgram(s)/kG/Min IV Continuous <Continuous>  sodium chloride 0.9% lock flush 3 milliLiter(s) IV Push every 8 hours  sodium chloride 0.9%. 1000 milliLiter(s) IV Continuous <Continuous>  vasopressin Infusion 0.033 Unit(s)/Min IV Continuous <Continuous>      PMHX/PSHX:  No pertinent past medical history    CHF (congestive heart failure)    CAD (coronary artery disease)    Depression    Pleural effusion    History of 2019 novel coronavirus disease (COVID-19)    Hemorrhoids    Bleeding hemorrhoids    Peripheral arterial disease    Claudication    BPH with urinary obstruction    ACC/AHA stage D systolic heart failure    Anticoagulation goal of INR 2.0 to 2.5    Falls    Clavicle fracture    CKD (chronic kidney disease), stage III    Iron deficiency anemia    H/O epistaxis    Vertigo    GI bleed    No significant past surgical history    S/P TVR (tricuspid valve repair)    S/P ventricular assist device    S/P endoscopy        Family history:  No pertinent family history in first degree relatives        ROS:  General: no night sweats, wt loss  CV: no pain, palpitation  Resp: no cough wheezing  Muscles: no weakness or pain  Endocrine: no polyuria, polydipsia, cold/heat intolerance  Heme: No petechia, ecchymosis    PHYSICAL EXAM:   GENERAL:  NAD  HEENT:  NC/AT,  conjunctivae clear, sclera -anicteric  CHEST:  Full & symmetric excursion, no increased  HEART:  Regular rhythm  ABDOMEN:  Soft, non-tender, non-distended, normoactive bowel sounds,  no masses ,no hepato-splenomegaly,   EXTREMITIES:  no cyanosis,clubbing or edema  SKIN:  No rash/erythema/ecchymoses/petechiae/wounds/abscess/warm/dry  NEURO:  Alert, oriented    Vital Signs:  Vital Signs Last 24 Hrs  T(C): 37.1 (2021 08:00), Max: 38.7 (15 Jamil 2021 20:00)  T(F): 98.8 (2021 08:00), Max: 101.7 (15 Jamil 2021 20:00)  HR: 78 (2021 08:15) (71 - 143)  BP: --  BP(mean): --  RR: 20 (2021 08:15) (14 - 59)  SpO2: 100% (2021 08:15) (87% - 100%)  Daily Height in cm: 177.8 (15 Jamil 2021 12:14)    Daily Weight in k.4 (2021 00:00)    LABS:                        8.4    12.67 )-----------( 111      ( 2021 04:18 )             26.6     06-16    134<L>  |  100  |  20  ----------------------------<  225<H>  4.0   |  17<L>  |  1.07    Ca    8.1<L>      2021 01:18  Phos  1.7     06-16  Mg     1.7     06-16    TPro  6.4  /  Alb  4.1  /  TBili  0.8  /  DBili  x   /  AST  17  /  ALT  8<L>  /  AlkPhos  50  06-16    LIVER FUNCTIONS - ( 2021 01:18 )  Alb: 4.1 g/dL / Pro: 6.4 g/dL / ALK PHOS: 50 U/L / ALT: 8 U/L / AST: 17 U/L / GGT: x           PT/INR - ( 2021 01:18 )   PT: 14.0 sec;   INR: 1.18 ratio         PTT - ( 2021 01:18 )  PTT:21.2 sec  Urinalysis Basic - ( 15 Jamil 2021 18:54 )    Color: Light Yellow / Appearance: Clear / S.018 / pH: x  Gluc: x / Ketone: Small  / Bili: Negative / Urobili: Negative   Blood: x / Protein: Trace / Nitrite: Negative   Leuk Esterase: Negative / RBC: 1 /hpf / WBC 0 /HPF   Sq Epi: x / Non Sq Epi: 1 /hpf / Bacteria: Negative          Imaging:             Chief Complaint:  Patient is a 66y old  Male who presents with a chief complaint of Anemia, Supratherapeutic INR, Dark Stools (2021 07:14)      Interval Events:   Patient had single balloon yesterday, negative.  Blood in NGT output.  Had aspiration event overnight, requiring intubation.    Allergies:  No Known Allergies      Hospital Medications:  aMIOdarone    Tablet 200 milliGRAM(s) Oral daily  chlorhexidine 0.12% Liquid 15 milliLiter(s) Oral Mucosa every 12 hours  chlorhexidine 2% Cloths 1 Application(s) Topical <User Schedule>  dexMEDEtomidine Infusion 0.5 MICROgram(s)/kG/Hr IV Continuous <Continuous>  dextrose 50% Injectable 50 milliLiter(s) IV Push every 15 minutes  DOBUTamine Infusion 4 MICROgram(s)/kG/Min IV Continuous <Continuous>  EPINEPHrine    Infusion 0.02 MICROgram(s)/kG/Min IV Continuous <Continuous>  insulin lispro (ADMELOG) corrective regimen sliding scale   SubCutaneous every 6 hours  insulin regular Infusion 3 Unit(s)/Hr IV Continuous <Continuous>  norepinephrine Infusion 0.05 MICROgram(s)/kG/Min IV Continuous <Continuous>  pantoprazole  Injectable 40 milliGRAM(s) IV Push every 12 hours  piperacillin/tazobactam IVPB.. 3.375 Gram(s) IV Intermittent every 8 hours  propofol Infusion 20 MICROgram(s)/kG/Min IV Continuous <Continuous>  sodium chloride 0.9% lock flush 3 milliLiter(s) IV Push every 8 hours  sodium chloride 0.9%. 1000 milliLiter(s) IV Continuous <Continuous>  vasopressin Infusion 0.033 Unit(s)/Min IV Continuous <Continuous>      PMHX/PSHX:  No pertinent past medical history    CHF (congestive heart failure)    CAD (coronary artery disease)    Depression    Pleural effusion    History of 2019 novel coronavirus disease (COVID-19)    Hemorrhoids    Bleeding hemorrhoids    Peripheral arterial disease    Claudication    BPH with urinary obstruction    ACC/AHA stage D systolic heart failure    Anticoagulation goal of INR 2.0 to 2.5    Falls    Clavicle fracture    CKD (chronic kidney disease), stage III    Iron deficiency anemia    H/O epistaxis    Vertigo    GI bleed    No significant past surgical history    S/P TVR (tricuspid valve repair)    S/P ventricular assist device    S/P endoscopy        Family history:  No pertinent family history in first degree relatives        ROS:  Intubated, sedated, unable to provide    PHYSICAL EXAM:   GENERAL:  NAD  HEENT:  NC/AT,  orally intubated  CHEST:  Full & symmetric excursion, no increased  HEART:  Regular rhythm  ABDOMEN:  Soft, non-tender, non-distended, normoactive bowel sounds,  no masses ,no hepato-splenomegaly,   EXTREMITIES:  no cyanosis,clubbing or edema  SKIN:  No rash/erythema  NEURO:  Sedated    Vital Signs:  Vital Signs Last 24 Hrs  T(C): 37.1 (2021 08:00), Max: 38.7 (15 Jamil 2021 20:00)  T(F): 98.8 (2021 08:00), Max: 101.7 (15 Jamil 2021 20:00)  HR: 78 (2021 08:15) (71 - 143)  BP: --  BP(mean): --  RR: 20 (2021 08:15) (14 - 59)  SpO2: 100% (2021 08:15) (87% - 100%)  Daily Height in cm: 177.8 (15 Jamil 2021 12:14)    Daily Weight in k.4 (2021 00:00)    LABS:                        8.4    12.67 )-----------( 111      ( 2021 04:18 )             26.6     06-16    134<L>  |  100  |  20  ----------------------------<  225<H>  4.0   |  17<L>  |  1.07    Ca    8.1<L>      2021 01:18  Phos  1.7     06-16  Mg     1.7     06-16    TPro  6.4  /  Alb  4.1  /  TBili  0.8  /  DBili  x   /  AST  17  /  ALT  8<L>  /  AlkPhos  50  06-16    LIVER FUNCTIONS - ( 2021 01:18 )  Alb: 4.1 g/dL / Pro: 6.4 g/dL / ALK PHOS: 50 U/L / ALT: 8 U/L / AST: 17 U/L / GGT: x           PT/INR - ( 2021 01:18 )   PT: 14.0 sec;   INR: 1.18 ratio         PTT - ( 2021 01:18 )  PTT:21.2 sec  Urinalysis Basic - ( 15 Jamil 2021 18:54 )    Color: Light Yellow / Appearance: Clear / S.018 / pH: x  Gluc: x / Ketone: Small  / Bili: Negative / Urobili: Negative   Blood: x / Protein: Trace / Nitrite: Negative   Leuk Esterase: Negative / RBC: 1 /hpf / WBC 0 /HPF   Sq Epi: x / Non Sq Epi: 1 /hpf / Bacteria: Negative          Imaging:

## 2021-06-16 NOTE — PROGRESS NOTE ADULT - ASSESSMENT
64 YO M with a history of stage D NICM s/p HM2 on 9/2017 as DT (due to severe PAD) with TV ring, prior COVID-19 infection 4/2020, recurrent syncope post LVAD s/p ILR, and chronic abdominal pain with prior negative workup who was admitted with symptomatic anemia with Hgb 4.5 in setting of INR 8.8 without hemodynamic instability. He was transfused and underwent VCE which showed active bleeding in the mid small bowel but subsequent enteroscopy 6/15 did not reveal any active bleeding. He acutely decompensated after procedure with fever/hypertension, low flow alarms, and pulmonary infiltrate with hypoxia requiring intubation from probable aspiration though bowel perforation and TRALI also on the differential. His LDH is normal and there are no signs of overt LVAD dysfunction on echo. Will follow up infectious workup and wean ventilator as tolerates.

## 2021-06-17 NOTE — PROGRESS NOTE ADULT - SUBJECTIVE AND OBJECTIVE BOX
Chief Complaint:  Patient is a 66y old  Male who presents with a chief complaint of Anemia, Supratherapeutic INR, Dark Stools (2021 06:24)      Interval Events:   Patient continues to be intubated, no BMs overnight    Allergies:  No Known Allergies      Hospital Medications:  aMIOdarone    Tablet 200 milliGRAM(s) Oral daily  chlorhexidine 0.12% Liquid 15 milliLiter(s) Oral Mucosa every 12 hours  chlorhexidine 2% Cloths 1 Application(s) Topical <User Schedule>  dexMEDEtomidine Infusion 0.5 MICROgram(s)/kG/Hr IV Continuous <Continuous>  dextrose 50% Injectable 50 milliLiter(s) IV Push every 15 minutes  EPINEPHrine    Infusion 0.02 MICROgram(s)/kG/Min IV Continuous <Continuous>  heparin  Infusion 400 Unit(s)/Hr IV Continuous <Continuous>  HYDROmorphone  Injectable 0.5 milliGRAM(s) IV Push once  insulin regular Infusion 3 Unit(s)/Hr IV Continuous <Continuous>  norepinephrine Infusion 0.05 MICROgram(s)/kG/Min IV Continuous <Continuous>  pantoprazole  Injectable 40 milliGRAM(s) IV Push every 12 hours  piperacillin/tazobactam IVPB.. 3.375 Gram(s) IV Intermittent every 8 hours  propofol Infusion 20 MICROgram(s)/kG/Min IV Continuous <Continuous>  sodium chloride 0.9% lock flush 3 milliLiter(s) IV Push every 8 hours  sodium chloride 0.9%. 1000 milliLiter(s) IV Continuous <Continuous>  vasopressin Infusion 0.033 Unit(s)/Min IV Continuous <Continuous>      PMHX/PSHX:  No pertinent past medical history    CHF (congestive heart failure)    CAD (coronary artery disease)    Depression    Pleural effusion    History of 2019 novel coronavirus disease (COVID-19)    Hemorrhoids    Bleeding hemorrhoids    Peripheral arterial disease    Claudication    BPH with urinary obstruction    ACC/AHA stage D systolic heart failure    Anticoagulation goal of INR 2.0 to 2.5    Falls    Clavicle fracture    CKD (chronic kidney disease), stage III    Iron deficiency anemia    H/O epistaxis    Vertigo    GI bleed    No significant past surgical history    S/P TVR (tricuspid valve repair)    S/P ventricular assist device    S/P endoscopy        Family history:  No pertinent family history in first degree relatives        ROS:  General: no night sweats, wt loss  CV: no pain, palpitation  Resp: no cough wheezing  Muscles: no weakness or pain  Endocrine: no polyuria, polydipsia, cold/heat intolerance  Heme: No petechia, ecchymosis    PHYSICAL EXAM:   GENERAL:  NAD  HEENT:  NC/AT,  conjunctivae clear, sclera -anicteric  CHEST:  Full & symmetric excursion, no increased  HEART:  Regular rhythm  ABDOMEN:  Soft, non-tender, non-distended, normoactive bowel sounds,  no masses ,no hepato-splenomegaly,   EXTREMITIES:  no cyanosis,clubbing or edema  SKIN:  No rash/erythema/ecchymoses/petechiae/wounds/abscess/warm/dry  NEURO:  Alert, oriented    Vital Signs:  Vital Signs Last 24 Hrs  T(C): 38.2 (2021 11:30), Max: 38.2 (2021 11:30)  T(F): 100.8 (2021 11:30), Max: 100.8 (2021 11:30)  HR: 74 (2021 11:30) (61 - 80)  BP: --  BP(mean): --  RR: 25 (2021 11:30) (20 - 38)  SpO2: 94% (2021 11:30) (90% - 100%)  Daily     Daily Weight in k.9 (2021 00:00)    LABS:                        8.3    16.61 )-----------( 118      ( 2021 01:36 )             25.9     06-17    130<L>  |  97  |  14  ----------------------------<  136<H>  4.8   |  19<L>  |  0.92    Ca    8.6      2021 01:36  Phos  2.0     06-17  Mg     2.8     06-17    TPro  6.7  /  Alb  4.1  /  TBili  1.0  /  DBili  x   /  AST  23  /  ALT  9<L>  /  AlkPhos  54  06-17    LIVER FUNCTIONS - ( 2021 01:36 )  Alb: 4.1 g/dL / Pro: 6.7 g/dL / ALK PHOS: 54 U/L / ALT: 9 U/L / AST: 23 U/L / GGT: x           PT/INR - ( 2021 01:36 )   PT: 15.4 sec;   INR: 1.30 ratio         PTT - ( 2021 05:08 )  PTT:41.7 sec  Urinalysis Basic - ( 15 Jamil 2021 18:54 )    Color: Light Yellow / Appearance: Clear / S.018 / pH: x  Gluc: x / Ketone: Small  / Bili: Negative / Urobili: Negative   Blood: x / Protein: Trace / Nitrite: Negative   Leuk Esterase: Negative / RBC: 1 /hpf / WBC 0 /HPF   Sq Epi: x / Non Sq Epi: 1 /hpf / Bacteria: Negative          Imaging:             Chief Complaint:  Patient is a 66y old  Male who presents with a chief complaint of Anemia, Supratherapeutic INR, Dark Stools (2021 06:24)      Interval Events:   Patient continues to be intubated, no BMs overnight    Allergies:  No Known Allergies      Hospital Medications:  aMIOdarone    Tablet 200 milliGRAM(s) Oral daily  chlorhexidine 0.12% Liquid 15 milliLiter(s) Oral Mucosa every 12 hours  chlorhexidine 2% Cloths 1 Application(s) Topical <User Schedule>  dexMEDEtomidine Infusion 0.5 MICROgram(s)/kG/Hr IV Continuous <Continuous>  dextrose 50% Injectable 50 milliLiter(s) IV Push every 15 minutes  EPINEPHrine    Infusion 0.02 MICROgram(s)/kG/Min IV Continuous <Continuous>  heparin  Infusion 400 Unit(s)/Hr IV Continuous <Continuous>  HYDROmorphone  Injectable 0.5 milliGRAM(s) IV Push once  insulin regular Infusion 3 Unit(s)/Hr IV Continuous <Continuous>  norepinephrine Infusion 0.05 MICROgram(s)/kG/Min IV Continuous <Continuous>  pantoprazole  Injectable 40 milliGRAM(s) IV Push every 12 hours  piperacillin/tazobactam IVPB.. 3.375 Gram(s) IV Intermittent every 8 hours  propofol Infusion 20 MICROgram(s)/kG/Min IV Continuous <Continuous>  sodium chloride 0.9% lock flush 3 milliLiter(s) IV Push every 8 hours  sodium chloride 0.9%. 1000 milliLiter(s) IV Continuous <Continuous>  vasopressin Infusion 0.033 Unit(s)/Min IV Continuous <Continuous>      PMHX/PSHX:  No pertinent past medical history    CHF (congestive heart failure)    CAD (coronary artery disease)    Depression    Pleural effusion    History of 2019 novel coronavirus disease (COVID-19)    Hemorrhoids    Bleeding hemorrhoids    Peripheral arterial disease    Claudication    BPH with urinary obstruction    ACC/AHA stage D systolic heart failure    Anticoagulation goal of INR 2.0 to 2.5    Falls    Clavicle fracture    CKD (chronic kidney disease), stage III    Iron deficiency anemia    H/O epistaxis    Vertigo    GI bleed    No significant past surgical history    S/P TVR (tricuspid valve repair)    S/P ventricular assist device    S/P endoscopy        Family history:  No pertinent family history in first degree relatives        ROS:  Unable given intubation    PHYSICAL EXAM:   GENERAL:  NAD  HEENT:  NC/AT,  conjunctivae clear, sclera -anicteric; intubated  CHEST:  Full & symmetric excursion, no increased  HEART:  Regular rhythm  ABDOMEN:  Soft, non-tender, non-distended, normoactive bowel sounds,  no masses ,no hepato-splenomegaly,   EXTREMITIES:  no cyanosis,clubbing or edema  SKIN:  No rash/erythema/ecchymoses/petechiae/wounds/abscess/warm/dry  NEURO: Sedated    Vital Signs:  Vital Signs Last 24 Hrs  T(C): 38.2 (2021 11:30), Max: 38.2 (2021 11:30)  T(F): 100.8 (2021 11:30), Max: 100.8 (2021 11:30)  HR: 74 (2021 11:30) (61 - 80)  BP: --  BP(mean): --  RR: 25 (2021 11:30) (20 - 38)  SpO2: 94% (2021 11:30) (90% - 100%)  Daily     Daily Weight in k.9 (2021 00:00)    LABS:                        8.3    16.61 )-----------( 118      ( 2021 01:36 )             25.9     06-17    130<L>  |  97  |  14  ----------------------------<  136<H>  4.8   |  19<L>  |  0.92    Ca    8.6      2021 01:36  Phos  2.0     06-17  Mg     2.8     06-17    TPro  6.7  /  Alb  4.1  /  TBili  1.0  /  DBili  x   /  AST  23  /  ALT  9<L>  /  AlkPhos  54  06-17    LIVER FUNCTIONS - ( 2021 01:36 )  Alb: 4.1 g/dL / Pro: 6.7 g/dL / ALK PHOS: 54 U/L / ALT: 9 U/L / AST: 23 U/L / GGT: x           PT/INR - ( 2021 01:36 )   PT: 15.4 sec;   INR: 1.30 ratio         PTT - ( 2021 05:08 )  PTT:41.7 sec  Urinalysis Basic - ( 15 Jamil 2021 18:54 )    Color: Light Yellow / Appearance: Clear / S.018 / pH: x  Gluc: x / Ketone: Small  / Bili: Negative / Urobili: Negative   Blood: x / Protein: Trace / Nitrite: Negative   Leuk Esterase: Negative / RBC: 1 /hpf / WBC 0 /HPF   Sq Epi: x / Non Sq Epi: 1 /hpf / Bacteria: Negative

## 2021-06-17 NOTE — PROGRESS NOTE ADULT - ASSESSMENT
64 YO M with a history of stage D NICM s/p HM2 on 9/2017 as DT (due to severe PAD) with TV ring, prior COVID-19 infection 4/2020, recurrent syncope post LVAD s/p ILR, and chronic abdominal pain with prior negative workup who was admitted with symptomatic anemia with Hgb 4.5 in setting of INR 8.8 without hemodynamic instability. He was transfused and underwent VCE which showed active bleeding in the mid small bowel but subsequent enteroscopy 6/15 did not reveal any active bleeding. He acutely decompensated after procedure with fever/hypertension, low flow alarms, and pulmonary infiltrate with hypoxia requiring intubation from probable aspiration though bowel perforation and TRALI also on the differential. His LDH is normal and there are no signs of overt LVAD dysfunction on echo. Will follow up infectious workup and wean ventilator as tolerates.  66 YO M with a history of stage D NICM s/p HM2 on 9/2017 as DT (due to severe PAD) with TV ring, prior COVID-19 infection 4/2020, recurrent syncope post LVAD s/p ILR, and chronic abdominal pain with prior negative workup who was admitted with symptomatic anemia with Hgb 4.5 in setting of INR 8.8 without hemodynamic instability. He was transfused and underwent VCE which showed active bleeding in the mid small bowel but subsequent enteroscopy 6/15 did not reveal any active bleeding. He acutely decompensated after procedure with fever/hypertension, low flow alarms, and pulmonary infiltrate with hypoxia requiring intubation from probable aspiration though bowel perforation and TRALI also on the differential. Reassuringly his cultures have been negative. His LDH is normal and there are no signs of overt LVAD dysfunction on echo. Will follow up infectious workup and wean ventilator as tolerates.

## 2021-06-17 NOTE — PROGRESS NOTE ADULT - SUBJECTIVE AND OBJECTIVE BOX
RICKY HAMPTON  MRN-[id number]  Patient is a 66y old  Male who presents with a chief complaint of Anemia, Supratherapeutic INR, Dark Stools (2021 19:36)    HPI:  65M PMH ACC/AHA stage D HF due to NICM HM2 LVAD , TV annuloplasty ring 17 as destination therapy due to severe peripheral artery disease with significant stenosis  SIADH, Depression, CKD-3 with hyperkalemia, past E. coli UTIs, driveline drainage (21) and COVID-19 (back in 2020)  He was recently seen in clinic where he complained of abdominal pain and dark stools w constipation back in May. He presents to [facility] ER today weakness and fatigue, moderate and + Black stools for three days, on coumadin secondary to warfarin use in the setting of an LVAD. Patient has required transfusions for GIB in the past. Mostly recently back in 2021 pt had anemia with dark stools. No interventions was done at that time. However Last Endoscopy was done in 2020 (negative). Today labs show patient is anemic with H/H of 4.5/16.3,. INR is 8.84 MAP in the 90s, Temp 35.1. He denies any chest pain, shortness of breath, dizziness, abd pain, nausea or vomiting.       (2021 16:57)      Surgery/Hospital course:   admit for melena w/ anemia, INR 8.84   6/15 Capsul study (+) for small bowel bleed, balloon endoscopy (old blood in prox ileum); post EGD - septic w/ L opacity, re-intubated for concern for aspiration, TTE (Mod MR, decrease biV w/ interventricular septum boweing towards R)    REVIEW OF SYSTEMS:  Unable to obtain due to patient being intubated    Vital Signs Last 24 Hrs  T(C): 36.9 (2021 04:00), Max: 38.1 (2021 16:00)  T(F): 98.4 (2021 04:00), Max: 100.6 (2021 16:00)  HR: 61 (2021 05:00) (61 - 89)  BP: --  BP(mean): --  RR: 20 (2021 05:00) (15 - 28)  SpO2: 100% (2021 05:00) (90% - 100%)    ============================I/O===========================   I&O's Detail    15 Jamil 2021 07:01  -  2021 07:00  --------------------------------------------------------  IN:    Albumin 5%  - 250 mL: 1750 mL    Dexmedetomidine: 58.6 mL    DOBUTamine: 65.8 mL    DOBUTamine: 30.6 mL    Enteral Tube Flush: 50 mL    EPINEPHrine: 132.4 mL    FentaNYL: 18 mL    Insulin: 28.5 mL    IV PiggyBack: 1225 mL    Milrinone: 9 mL    NiCARdipine: 15 mL    Norepinephrine: 92.9 mL    PRBCs (Packed Red Blood Cells): 300 mL    Propofol: 141.6 mL    sodium chloride 0.9%: 160 mL    Vasopressin: 72 mL  Total IN: 4149.4 mL    OUT:    Nasogastric/Oral tube (mL): 250 mL    Voided (mL): 1225 mL  Total OUT: 1475 mL    Total NET: 2674.4 mL      2021 07:01  -  2021 06:24  --------------------------------------------------------  IN:    Albumin 5%  - 250 mL: 500 mL    Dexmedetomidine: 251.7 mL    DOBUTamine: 18.8 mL    DOBUTamine: 14.1 mL    Enteral Tube Flush: 60 mL    EPINEPHrine: 123.9 mL    EPINEPHrine: 5.9 mL    FentaNYL: 2 mL    Heparin: 104 mL    Insulin: 46 mL    IV PiggyBack: 675 mL    Miscellaneous Tube Feedin mL    Norepinephrine: 42.2 mL    Propofol: 160.9 mL    sodium chloride 0.9%: 220 mL    Vasopressin: 90 mL  Total IN: 2514.5 mL    OUT:    Voided (mL): 2745 mL  Total OUT: 2745 mL    Total NET: -230.5 mL        ============================ LABS =========================                        8.3    16.61 )-----------( 118      ( 2021 01:36 )             25.9     06-17    130<L>  |  97  |  14  ----------------------------<  136<H>  4.8   |  19<L>  |  0.92    Ca    8.6      2021 01:36  Phos  2.0     06-17  Mg     2.8     06-17    TPro  6.7  /  Alb  4.1  /  TBili  1.0  /  DBili  x   /  AST  23  /  ALT  9<L>  /  AlkPhos  54  06-17    LIVER FUNCTIONS - ( 2021 01:36 )  Alb: 4.1 g/dL / Pro: 6.7 g/dL / ALK PHOS: 54 U/L / ALT: 9 U/L / AST: 23 U/L / GGT: x           PT/INR - ( 2021 01:36 )   PT: 15.4 sec;   INR: 1.30 ratio         PTT - ( 2021 05:08 )  PTT:41.7 sec  ABG - ( 2021 04:50 )  pH, Arterial: 7.42  pH, Blood: x     /  pCO2: 36    /  pO2: 115   / HCO3: 23    / Base Excess: -1.1  /  SaO2: 99                Urinalysis Basic - ( 15 Jamil 2021 18:54 )    Color: Light Yellow / Appearance: Clear / S.018 / pH: x  Gluc: x / Ketone: Small  / Bili: Negative / Urobili: Negative   Blood: x / Protein: Trace / Nitrite: Negative   Leuk Esterase: Negative / RBC: 1 /hpf / WBC 0 /HPF   Sq Epi: x / Non Sq Epi: 1 /hpf / Bacteria: Negative      ======================Microbiology/Radiology=================  Culture: Reviewed   CXR: Reviewed  ======================================================  PAST MEDICAL & SURGICAL HISTORY:  CHF (congestive heart failure)    CAD (coronary artery disease)    Depression    Pleural effusion    History of  novel coronavirus disease (COVID-19)  2020    Hemorrhoids    Bleeding hemorrhoids    Peripheral arterial disease    Claudication    BPH with urinary obstruction    ACC/AHA stage D systolic heart failure    Anticoagulation goal of INR 2.0 to 2.5    Falls    Clavicle fracture    CKD (chronic kidney disease), stage III    Iron deficiency anemia    H/O epistaxis    Vertigo    GI bleed    S/P TVR (tricuspid valve repair)    S/P ventricular assist device    S/P endoscopy      ====================ASSESSMENT ==============  Stage D Nonischemic Cardiomyopathy, Status Post HM2 on 2017   Recent driveline infection on 2021   acute hypoxemic respiratory failure  aspiration pneumonia  septic shock  cardiogenic shock  GI bleed , Status Post Enteroscopy   Anemia, in setting of melena   Chronic Kidney Disease  Thrombocytopenia   Leukocytosis  Stress Hyperglycemia  coagulopathy   Stress hyperglycemia   Hemodynamic instability   Hypertension     Plan:  ====================== NEUROLOGY=====================  Sedated with IV Precedex and IV Propofol to maintain vent synchrony     dexMEDEtomidine Infusion 0.5 MICROgram(s)/kG/Hr (9.81 mL/Hr) IV Continuous <Continuous>  propofol Infusion 20 MICROgram(s)/kG/Min (9.42 mL/Hr) IV Continuous <Continuous>    ==================== RESPIRATORY======================I  Intubated post endoscopy on 6/15 for concern for aspiration in setting of sepsis w/ L opacity   Respiratory status required full ventilatory support, close monitoring of respiratory rate and breathing pattern, the following of ABG’s with A-line monitoring, continuous pulse oximetry monitoring    Mechanical Ventilation:  Mode: AC/ CMV (Assist Control/ Continuous Mandatory Ventilation)  RR (machine): 20  TV (machine): 500  FiO2: 40  PEEP: 10  ITime: 1  MAP: 14  PIP: 26      ====================CARDIOVASCULAR==================  Stage D NICM, s/p HM2 on 2017; recent driveline infection on 2021   Admitted for anemia with supratherapeutic INR 8.84  Echo 6/15: severe global LV systolic dysfunction with HM2 in place, decreased RV systolic function, interventricular septums bows slightly to right, otherwise grossly similar to prior   Continue invasive hemodynamic monitoring   Rate control with Amiodarone   Inotropic support with IV Epinephrine   On pressor support with IV Levophed and IV Vasopressin     aMIOdarone    Tablet 200 milliGRAM(s) Oral daily  EPINEPHrine    Infusion 0.02 MICROgram(s)/kG/Min (5.89 mL/Hr) IV Continuous <Continuous>  norepinephrine Infusion 0.05 MICROgram(s)/kG/Min (7.36 mL/Hr) IV Continuous <Continuous>  vasopressin Infusion 0.033 Unit(s)/Min (2 mL/Hr) IV Continuous <Continuous>    ===================HEMATOLOGIC/ONC ===================  Anemia in setting of melena and supratherapeutic INR of 8.84 on admission   INR 1.30 now, will continue to monitor closely.   Monitor H&H/Plts     heparin  Infusion 400 Unit(s)/Hr (6 mL/Hr) IV Continuous <Continuous>    ===================== RENAL =========================  Continue monitoring urine output, I&Os, Bun/Cr  Monitor and replete lytes prn    ==================== GASTROINTESTINAL===================  Abdominal XR today: nonobstructive bowel gas pattern and large stool burden.  S/p capsule study notable for bleed in small bowel s/p balloon endoscopy on 6/15, notable for old blood in proximal ileum, no acute intervention indicated   Tolerating tube feeds, Vital AF @ 55cc/hr     GI prophylaxis, pantoprazole  Injectable 40 milliGRAM(s) IV Push every 12 hours  potassium chloride  10 mEq/50 mL IVPB 10 milliEquivalent(s) IV Intermittent every 1 hour  sodium chloride 0.9% lock flush 3 milliLiter(s) IV Push every 8 hours  sodium chloride 0.9%. 1000 milliLiter(s) (10 mL/Hr) IV Continuous <Continuous>    =======================    ENDOCRINE  =====================  Continue glucose control with IV insulin drip for stress hyperglycemia    dextrose 50% Injectable 50 milliLiter(s) IV Push every 15 minutes  insulin regular Infusion 3 Unit(s)/Hr (3 mL/Hr) IV Continuous <Continuous>    ========================INFECTIOUS DISEASE================  Concern for sepsis +rigors with temp up to 101.0F, c/w Zosyn   BCx 6/15 with NGTD     piperacillin/tazobactam IVPB.. 3.375 Gram(s) IV Intermittent every 8 hours      TIME:     Patient requires continuous monitoring with bedside rhythm monitoring, pulse ox monitoring, and intermittent blood gas analysis. Care plan discussed with ICU care team. Patient remained critical and at risk for life threatening decompensation.    By signing my name below, I, Agnieszka Cherry, attest that this documentation has been prepared under the direction and in the presence of Jus Jensen MD   Electronically signed: Cesia Shaffer Matthew Pierce, personally performed the services described in this documentation. all medical record entries made by the scribe were at my direction and in my presence. I have reviewed the chart and agree that the record reflects my personal performance and is accurate and complete  Electronically signed: Jus Jensen MD 21 @ 06:24       RICKY HAMPTON  MRN-[id number]  Patient is a 66y old  Male who presents with a chief complaint of Anemia, Supratherapeutic INR, Dark Stools (2021 19:36)    HPI:  65M PMH ACC/AHA stage D HF due to NICM HM2 LVAD , TV annuloplasty ring 17 as destination therapy due to severe peripheral artery disease with significant stenosis  SIADH, Depression, CKD-3 with hyperkalemia, past E. coli UTIs, driveline drainage (21) and COVID-19 (back in 2020)  He was recently seen in clinic where he complained of abdominal pain and dark stools w constipation back in May. He presents to [facility] ER today weakness and fatigue, moderate and + Black stools for three days, on coumadin secondary to warfarin use in the setting of an LVAD. Patient has required transfusions for GIB in the past. Mostly recently back in 2021 pt had anemia with dark stools. No interventions was done at that time. However Last Endoscopy was done in 2020 (negative). Today labs show patient is anemic with H/H of 4.5/16.3,. INR is 8.84 MAP in the 90s, Temp 35.1. He denies any chest pain, shortness of breath, dizziness, abd pain, nausea or vomiting.       (2021 16:57)      Surgery/Hospital course:   admit for melena w/ anemia, INR 8.84   6/15 Capsul study (+) for small bowel bleed, balloon endoscopy (old blood in prox ileum); post EGD - septic w/ L opacity, re-intubated for concern for aspiration, TTE (Mod MR, decrease biV w/ interventricular septum boweing towards R)    REVIEW OF SYSTEMS:  Unable to obtain due to patient being intubated    Vital Signs Last 24 Hrs  T(C): 36.9 (2021 04:00), Max: 38.1 (2021 16:00)  T(F): 98.4 (2021 04:00), Max: 100.6 (2021 16:00)  HR: 61 (2021 05:00) (61 - 89)  BP: --  BP(mean): --  RR: 20 (2021 05:00) (15 - 28)  SpO2: 100% (2021 05:00) (90% - 100%)    ============================I/O===========================   I&O's Detail    15 Jamil 2021 07:01  -  2021 07:00  --------------------------------------------------------  IN:    Albumin 5%  - 250 mL: 1750 mL    Dexmedetomidine: 58.6 mL    DOBUTamine: 65.8 mL    DOBUTamine: 30.6 mL    Enteral Tube Flush: 50 mL    EPINEPHrine: 132.4 mL    FentaNYL: 18 mL    Insulin: 28.5 mL    IV PiggyBack: 1225 mL    Milrinone: 9 mL    NiCARdipine: 15 mL    Norepinephrine: 92.9 mL    PRBCs (Packed Red Blood Cells): 300 mL    Propofol: 141.6 mL    sodium chloride 0.9%: 160 mL    Vasopressin: 72 mL  Total IN: 4149.4 mL    OUT:    Nasogastric/Oral tube (mL): 250 mL    Voided (mL): 1225 mL  Total OUT: 1475 mL    Total NET: 2674.4 mL      2021 07:01  -  2021 06:24  --------------------------------------------------------  IN:    Albumin 5%  - 250 mL: 500 mL    Dexmedetomidine: 251.7 mL    DOBUTamine: 18.8 mL    DOBUTamine: 14.1 mL    Enteral Tube Flush: 60 mL    EPINEPHrine: 123.9 mL    EPINEPHrine: 5.9 mL    FentaNYL: 2 mL    Heparin: 104 mL    Insulin: 46 mL    IV PiggyBack: 675 mL    Miscellaneous Tube Feedin mL    Norepinephrine: 42.2 mL    Propofol: 160.9 mL    sodium chloride 0.9%: 220 mL    Vasopressin: 90 mL  Total IN: 2514.5 mL    OUT:    Voided (mL): 2745 mL  Total OUT: 2745 mL    Total NET: -230.5 mL        ============================ LABS =========================                        8.3    16.61 )-----------( 118      ( 2021 01:36 )             25.9     06-17    130<L>  |  97  |  14  ----------------------------<  136<H>  4.8   |  19<L>  |  0.92    Ca    8.6      2021 01:36  Phos  2.0     06-17  Mg     2.8     06-17    TPro  6.7  /  Alb  4.1  /  TBili  1.0  /  DBili  x   /  AST  23  /  ALT  9<L>  /  AlkPhos  54  06-17    LIVER FUNCTIONS - ( 2021 01:36 )  Alb: 4.1 g/dL / Pro: 6.7 g/dL / ALK PHOS: 54 U/L / ALT: 9 U/L / AST: 23 U/L / GGT: x           PT/INR - ( 2021 01:36 )   PT: 15.4 sec;   INR: 1.30 ratio         PTT - ( 2021 05:08 )  PTT:41.7 sec  ABG - ( 2021 04:50 )  pH, Arterial: 7.42  pH, Blood: x     /  pCO2: 36    /  pO2: 115   / HCO3: 23    / Base Excess: -1.1  /  SaO2: 99                Urinalysis Basic - ( 15 Jamil 2021 18:54 )    Color: Light Yellow / Appearance: Clear / S.018 / pH: x  Gluc: x / Ketone: Small  / Bili: Negative / Urobili: Negative   Blood: x / Protein: Trace / Nitrite: Negative   Leuk Esterase: Negative / RBC: 1 /hpf / WBC 0 /HPF   Sq Epi: x / Non Sq Epi: 1 /hpf / Bacteria: Negative      ======================Microbiology/Radiology=================  Culture: Reviewed   CXR: Reviewed  ======================================================  PAST MEDICAL & SURGICAL HISTORY:  CHF (congestive heart failure)    CAD (coronary artery disease)    Depression    Pleural effusion    History of  novel coronavirus disease (COVID-19)  2020    Hemorrhoids    Bleeding hemorrhoids    Peripheral arterial disease    Claudication    BPH with urinary obstruction    ACC/AHA stage D systolic heart failure    Anticoagulation goal of INR 2.0 to 2.5    Falls    Clavicle fracture    CKD (chronic kidney disease), stage III    Iron deficiency anemia    H/O epistaxis    Vertigo    GI bleed    S/P TVR (tricuspid valve repair)    S/P ventricular assist device    S/P endoscopy      ====================ASSESSMENT ==============  Stage D Nonischemic Cardiomyopathy, Status Post HM2 on 2017   Recent driveline infection on 2021   acute hypoxemic respiratory failure  aspiration pneumonia  septic shock  cardiogenic shock  GI bleed , Status Post Enteroscopy   Anemia, in setting of melena   Chronic Kidney Disease  Thrombocytopenia   Leukocytosis  Stress Hyperglycemia  coagulopathy   Stress hyperglycemia   Hemodynamic instability   Hypertension     Plan:  ====================== NEUROLOGY=====================  Sedated with IV Precedex and IV Propofol to maintain vent synchrony  Plan to increase Precedex for dyssynchronous, will add Ketamine if needed to maintain vent synchrony     dexMEDEtomidine Infusion 0.5 MICROgram(s)/kG/Hr (9.81 mL/Hr) IV Continuous <Continuous>  propofol Infusion 20 MICROgram(s)/kG/Min (9.42 mL/Hr) IV Continuous <Continuous>    ==================== RESPIRATORY======================I  Intubated post endoscopy on 6/15 for concern for aspiration in setting of sepsis w/ L opacity   Respiratory status required full ventilatory support, close monitoring of respiratory rate and breathing pattern, the following of ABG’s with A-line monitoring, continuous pulse oximetry monitoring  Decrease PEEP to 5 as tolerated     Mechanical Ventilation:  Mode: AC/ CMV (Assist Control/ Continuous Mandatory Ventilation)  RR (machine): 20  TV (machine): 500  FiO2: 40  PEEP: 10  ITime: 1  MAP: 14  PIP: 26      ====================CARDIOVASCULAR==================  Stage D NICM, s/p HM2 on 2017; recent driveline infection on 2021; HM2 settings: 8800rpm, flow 3.4   Admitted for anemia with supratherapeutic INR 8.84  Echo 6/15: severe global LV systolic dysfunction with HM2 in place, decreased RV systolic function, interventricular septums bows slightly to right, otherwise grossly similar to prior   Continue invasive hemodynamic monitoring   Rate control with Amiodarone   Inotropic support with IV Epinephrine, wean as tolerated   On pressor support with IV Levophed and IV Vasopressin     aMIOdarone    Tablet 200 milliGRAM(s) Oral daily  EPINEPHrine    Infusion 0.02 MICROgram(s)/kG/Min (5.89 mL/Hr) IV Continuous <Continuous>  norepinephrine Infusion 0.05 MICROgram(s)/kG/Min (7.36 mL/Hr) IV Continuous <Continuous>  vasopressin Infusion 0.033 Unit(s)/Min (2 mL/Hr) IV Continuous <Continuous>    ===================HEMATOLOGIC/ONC ===================  Anemia in setting of melena and supratherapeutic INR of 8.84 on admission   INR 1.30 now, will continue to monitor closely.   Monitor H&H/Plts     heparin  Infusion 400 Unit(s)/Hr (6 mL/Hr) IV Continuous <Continuous>    ===================== RENAL =========================  Continue monitoring urine output, I&Os, Bun/Cr  Target even to slightly negative fluid balance  Monitor and replete lytes prn    ==================== GASTROINTESTINAL===================  Abdominal XR today: nonobstructive bowel gas pattern and large stool burden.  S/p capsule study notable for bleed in small bowel s/p balloon endoscopy on 6/15, notable for old blood in proximal ileum, no acute intervention indicated   Tolerating tube feeds, Vital AF @ trickle, goal 40cc/hr   Will follow up with GI    GI prophylaxis, pantoprazole  Injectable 40 milliGRAM(s) IV Push every 12 hours  potassium chloride  10 mEq/50 mL IVPB 10 milliEquivalent(s) IV Intermittent every 1 hour  sodium chloride 0.9% lock flush 3 milliLiter(s) IV Push every 8 hours  sodium chloride 0.9%. 1000 milliLiter(s) (10 mL/Hr) IV Continuous <Continuous>    =======================    ENDOCRINE  =====================  Continue glucose control with IV insulin drip for stress hyperglycemia    dextrose 50% Injectable 50 milliLiter(s) IV Push every 15 minutes  insulin regular Infusion 3 Unit(s)/Hr (3 mL/Hr) IV Continuous <Continuous>    ========================INFECTIOUS DISEASE================  Afebrile, WBC downtrending 18.65->16.61   Concern for sepsis +rigors with temp up to 101.0F on 6/15, c/w Zosyn   BCx 6/15 with NGTD, SCx  pending, will f/u     piperacillin/tazobactam IVPB.. 3.375 Gram(s) IV Intermittent every 8 hours      TIME:     Patient requires continuous monitoring with bedside rhythm monitoring, pulse ox monitoring, and intermittent blood gas analysis. Care plan discussed with ICU care team. Patient remained critical and at risk for life threatening decompensation.    By signing my name below, I, Agnieszka Cherry, attest that this documentation has been prepared under the direction and in the presence of Jus Jensen MD   Electronically signed: Cesia Shaffer Jus Jensen, personally performed the services described in this documentation. all medical record entries made by the scribe were at my direction and in my presence. I have reviewed the chart and agree that the record reflects my personal performance and is accurate and complete  Electronically signed: Jus Jensen MD 21 @ 06:24       RICKY HAMPTON  MRN-[id number]  Patient is a 66y old  Male who presents with a chief complaint of Anemia, Supratherapeutic INR, Dark Stools (2021 19:36)    HPI:  65M PMH ACC/AHA stage D HF due to NICM HM2 LVAD , TV annuloplasty ring 17 as destination therapy due to severe peripheral artery disease with significant stenosis  SIADH, Depression, CKD-3 with hyperkalemia, past E. coli UTIs, driveline drainage (21) and COVID-19 (back in 2020)  He was recently seen in clinic where he complained of abdominal pain and dark stools w constipation back in May. He presents to [facility] ER today weakness and fatigue, moderate and + Black stools for three days, on coumadin secondary to warfarin use in the setting of an LVAD. Patient has required transfusions for GIB in the past. Mostly recently back in 2021 pt had anemia with dark stools. No interventions was done at that time. However Last Endoscopy was done in 2020 (negative). Today labs show patient is anemic with H/H of 4.5/16.3,. INR is 8.84 MAP in the 90s, Temp 35.1. He denies any chest pain, shortness of breath, dizziness, abd pain, nausea or vomiting.       (2021 16:57)      Surgery/Hospital course:   admit for melena w/ anemia, INR 8.84   6/15 Capsul study (+) for small bowel bleed, balloon endoscopy (old blood in prox ileum); post EGD - septic w/ L opacity, re-intubated for concern for aspiration, TTE (Mod MR, decrease biV w/ interventricular septum boweing towards R)    REVIEW OF SYSTEMS:  Unable to obtain due to patient being intubated    Vital Signs Last 24 Hrs  T(C): 36.9 (2021 04:00), Max: 38.1 (2021 16:00)  T(F): 98.4 (2021 04:00), Max: 100.6 (2021 16:00)  HR: 61 (2021 05:00) (61 - 89)  BP: --  BP(mean): --  RR: 20 (2021 05:00) (15 - 28)  SpO2: 100% (2021 05:00) (90% - 100%)    ============================I/O===========================   I&O's Detail    15 Jamil 2021 07:01  -  2021 07:00  --------------------------------------------------------  IN:    Albumin 5%  - 250 mL: 1750 mL    Dexmedetomidine: 58.6 mL    DOBUTamine: 65.8 mL    DOBUTamine: 30.6 mL    Enteral Tube Flush: 50 mL    EPINEPHrine: 132.4 mL    FentaNYL: 18 mL    Insulin: 28.5 mL    IV PiggyBack: 1225 mL    Milrinone: 9 mL    NiCARdipine: 15 mL    Norepinephrine: 92.9 mL    PRBCs (Packed Red Blood Cells): 300 mL    Propofol: 141.6 mL    sodium chloride 0.9%: 160 mL    Vasopressin: 72 mL  Total IN: 4149.4 mL    OUT:    Nasogastric/Oral tube (mL): 250 mL    Voided (mL): 1225 mL  Total OUT: 1475 mL    Total NET: 2674.4 mL      2021 07:01  -  2021 06:24  --------------------------------------------------------  IN:    Albumin 5%  - 250 mL: 500 mL    Dexmedetomidine: 251.7 mL    DOBUTamine: 18.8 mL    DOBUTamine: 14.1 mL    Enteral Tube Flush: 60 mL    EPINEPHrine: 123.9 mL    EPINEPHrine: 5.9 mL    FentaNYL: 2 mL    Heparin: 104 mL    Insulin: 46 mL    IV PiggyBack: 675 mL    Miscellaneous Tube Feedin mL    Norepinephrine: 42.2 mL    Propofol: 160.9 mL    sodium chloride 0.9%: 220 mL    Vasopressin: 90 mL  Total IN: 2514.5 mL    OUT:    Voided (mL): 2745 mL  Total OUT: 2745 mL    Total NET: -230.5 mL        ============================ LABS =========================                        8.3    16.61 )-----------( 118      ( 2021 01:36 )             25.9     06-17    130<L>  |  97  |  14  ----------------------------<  136<H>  4.8   |  19<L>  |  0.92    Ca    8.6      2021 01:36  Phos  2.0     06-17  Mg     2.8     06-17    TPro  6.7  /  Alb  4.1  /  TBili  1.0  /  DBili  x   /  AST  23  /  ALT  9<L>  /  AlkPhos  54  06-17    LIVER FUNCTIONS - ( 2021 01:36 )  Alb: 4.1 g/dL / Pro: 6.7 g/dL / ALK PHOS: 54 U/L / ALT: 9 U/L / AST: 23 U/L / GGT: x           PT/INR - ( 2021 01:36 )   PT: 15.4 sec;   INR: 1.30 ratio         PTT - ( 2021 05:08 )  PTT:41.7 sec  ABG - ( 2021 04:50 )  pH, Arterial: 7.42  pH, Blood: x     /  pCO2: 36    /  pO2: 115   / HCO3: 23    / Base Excess: -1.1  /  SaO2: 99                Urinalysis Basic - ( 15 Jamil 2021 18:54 )    Color: Light Yellow / Appearance: Clear / S.018 / pH: x  Gluc: x / Ketone: Small  / Bili: Negative / Urobili: Negative   Blood: x / Protein: Trace / Nitrite: Negative   Leuk Esterase: Negative / RBC: 1 /hpf / WBC 0 /HPF   Sq Epi: x / Non Sq Epi: 1 /hpf / Bacteria: Negative      ======================Microbiology/Radiology=================  Culture: Reviewed   CXR: Reviewed  ======================================================  PAST MEDICAL & SURGICAL HISTORY:  CHF (congestive heart failure)    CAD (coronary artery disease)    Depression    Pleural effusion    History of  novel coronavirus disease (COVID-19)  2020    Hemorrhoids    Bleeding hemorrhoids    Peripheral arterial disease    Claudication    BPH with urinary obstruction    ACC/AHA stage D systolic heart failure    Anticoagulation goal of INR 2.0 to 2.5    Falls    Clavicle fracture    CKD (chronic kidney disease), stage III    Iron deficiency anemia    H/O epistaxis    Vertigo    GI bleed    S/P TVR (tricuspid valve repair)    S/P ventricular assist device    S/P endoscopy      ====================ASSESSMENT ==============  Stage D Nonischemic Cardiomyopathy, Status Post HM2 on 2017   Recent driveline infection on 2021   acute hypoxemic respiratory failure  aspiration pneumonia  septic shock  cardiogenic shock  GI bleed , Status Post Enteroscopy   Anemia, in setting of melena   Chronic Kidney Disease  Thrombocytopenia   Leukocytosis  Stress Hyperglycemia  coagulopathy   Stress hyperglycemia   Hemodynamic instability   Hypertension     Plan:  ====================== NEUROLOGY=====================  Sedated with IV Precedex and IV Propofol to maintain vent synchrony  Plan to increase Precedex for dyssynchronous, will add Ketamine if needed to maintain vent synchrony     dexMEDEtomidine Infusion 0.5 MICROgram(s)/kG/Hr (9.81 mL/Hr) IV Continuous <Continuous>  propofol Infusion 20 MICROgram(s)/kG/Min (9.42 mL/Hr) IV Continuous <Continuous>    ==================== RESPIRATORY======================I  Intubated post endoscopy on 6/15 for concern for aspiration in setting of sepsis w/ L opacity   Respiratory status required full ventilatory support, close monitoring of respiratory rate and breathing pattern, the following of ABG’s with A-line monitoring, continuous pulse oximetry monitoring  Decrease PEEP to 5 as tolerated     Mechanical Ventilation:  Mode: AC/ CMV (Assist Control/ Continuous Mandatory Ventilation)  RR (machine): 20  TV (machine): 500  FiO2: 40  PEEP: 10  ITime: 1  MAP: 14  PIP: 26      ====================CARDIOVASCULAR==================  Stage D NICM, s/p HM2 on 2017; recent driveline infection on 2021; HM2 settings: 8800rpm, flow 3.4   Admitted for anemia with supratherapeutic INR 8.84  Echo 6/15: severe global LV systolic dysfunction with HM2 in place, decreased RV systolic function, interventricular septums bows slightly to right, otherwise grossly similar to prior   Continue invasive hemodynamic monitoring   Rate control with Amiodarone   Inotropic support with IV Epinephrine, wean as tolerated   On pressor support with IV Levophed and IV Vasopressin     aMIOdarone    Tablet 200 milliGRAM(s) Oral daily  EPINEPHrine    Infusion 0.02 MICROgram(s)/kG/Min (5.89 mL/Hr) IV Continuous <Continuous>  norepinephrine Infusion 0.05 MICROgram(s)/kG/Min (7.36 mL/Hr) IV Continuous <Continuous>  vasopressin Infusion 0.033 Unit(s)/Min (2 mL/Hr) IV Continuous <Continuous>    ===================HEMATOLOGIC/ONC ===================  Anemia in setting of melena and supratherapeutic INR of 8.84 on admission   INR 1.30 now, will continue to monitor closely.   Monitor H&H/Plts     heparin  Infusion 400 Unit(s)/Hr (6 mL/Hr) IV Continuous <Continuous>    ===================== RENAL =========================  Continue monitoring urine output, I&Os, Bun/Cr  Target even to slightly negative fluid balance  Monitor and replete lytes prn    ==================== GASTROINTESTINAL===================  Abdominal XR today: nonobstructive bowel gas pattern and large stool burden.  S/p capsule study notable for bleed in small bowel s/p balloon endoscopy on 6/15, notable for old blood in proximal ileum, no acute intervention indicated   Tolerating tube feeds, Vital AF @ trickle, goal 40cc/hr   Will follow up with GI    GI prophylaxis, pantoprazole  Injectable 40 milliGRAM(s) IV Push every 12 hours  potassium chloride  10 mEq/50 mL IVPB 10 milliEquivalent(s) IV Intermittent every 1 hour  sodium chloride 0.9% lock flush 3 milliLiter(s) IV Push every 8 hours  sodium chloride 0.9%. 1000 milliLiter(s) (10 mL/Hr) IV Continuous <Continuous>    =======================    ENDOCRINE  =====================  Continue glucose control with IV insulin drip for stress hyperglycemia    dextrose 50% Injectable 50 milliLiter(s) IV Push every 15 minutes  insulin regular Infusion 3 Unit(s)/Hr (3 mL/Hr) IV Continuous <Continuous>    ========================INFECTIOUS DISEASE================  Afebrile, WBC downtrending 18.65->16.61   Concern for sepsis +rigors with temp up to 101.0F on 6/15, c/w Zosyn   BCx 6/15 with NGTD, SCx  pending, will f/u     piperacillin/tazobactam IVPB.. 3.375 Gram(s) IV Intermittent every 8 hours    Patient requires continuous monitoring with bedside rhythm monitoring, pulse ox monitoring, and intermittent blood gas analysis. Care plan discussed with ICU care team. Patient remained critical and at risk for life threatening decompensation.    By signing my name below, Agnieszka STANLEY, attest that this documentation has been prepared under the direction and in the presence of Jus Jensen MD   Electronically signed: Cesia Shaffer Matthew Jensen, personally performed the services described in this documentation. all medical record entries made by the scribe were at my direction and in my presence. I have reviewed the chart and agree that the record reflects my personal performance and is accurate and complete  Electronically signed: Jus Jensen MD 21 @ 06:24

## 2021-06-17 NOTE — ANESTHESIA FOLLOW-UP NOTE - NSEVALATIONFT_GEN_ALL_CORE
Interim events reviewed, patient examined. Mr. Quispe remains intubated, sedated, on hemodynamic support for respiratory failure from left lung pneumonia, possible aspiration. Continue ICU care.

## 2021-06-17 NOTE — CHART NOTE - NSCHARTNOTEFT_GEN_A_CORE
Pre-Bronchoscopy Tuberculosis Risk Screening Tool  To reduce the risk for airborne transmission of Mycobacterium tuberculosis, this assessment form must be completed prior to bronchoscopy procedures being performed outside of a negative pressure environment.    Procedure Date:  06-17-21 @ 12:15  Health Care Provider Name: Jaclyn Mendoza NP  Form Completed By: Jaclyn Mendoza NP  Reason for the Bronchoscopy: Secretions/atelectasis  Date of Procedure:  06-17-21 @ 12:15  Planned Location for the Procedure: X Intensive Care Unit       Risk Assessment  I. I have personally evaluated this patient for Mycobacterium tuberculosis and I determined the following risk:       X No Risk for TB  [ ]  Low risk or TB     [ ] Significant risk for TB    II. Additional Findings: None    III. Based on the Determined Risk for TB the following Action(s) are Suggested:  1. If there are no risk factors for TB infection proceed with the procedure.  2. If there is low risk or significant risk for TB infection the following recommendations should be followed:            a. Perform the procedure in a negative pressure room, with N95 respirator.            b. If not feasible to move the patient or defer the procedure:                    i. Use a single-bedded room low traffic area to perform the bronchoscopy procedure.                   ii. Place a portable high-efficiency particulate air (HEPA) filter in the space prior to starting the procedure and keep closed.                       Refer to the INF.1132 titled “Tuberculosis Control Strategy Plan” for additional information.                  iii. All Health Care Providers within the procedure room shall wear an N95 respirator.            c. Documentation of the tuberculosis risk assessment should be included within the patient’s medical record.

## 2021-06-17 NOTE — PROGRESS NOTE ADULT - PROBLEM SELECTOR PLAN 4
- Continue with current speed (8800 rpm), if further low flow alarms will increase LVAD speed  - c/w heparin but would target lower PTT goal at this time. Eventual resumption of warfarin   - Will plan to hold aspirin indefinitely  - Continue to monitor LDH daily.

## 2021-06-17 NOTE — PROGRESS NOTE ADULT - ASSESSMENT
Impression:  # Anemia - patient reports black stool however formed and hard not consistent with bleed; minimal dark brown stool on exam; hgb low compared to baseline (baseline 7-8, presented w/ 4.5). On previous admissions (1/2021) Hematology reviewed smear consistent with LVAD changes. Now s/p + capsul;e but negative single balloon   # LVAD on AC - supratherapeutic INR    Recommendation:  - trend CBCq12 hours and transfuse for goal >8  - supportive care per primary  - active Type and screen  - ok to resume anticoagulation; avoid supratherapeutic anticoagulation  - rest of care per primary team    Aleksandra Olivier  Gastroenterology Fellow  Pager x [phone] or [phone]  Please page on-call Fellow on weekends/holidays or after 5 pm and before 8 am on weekdays   On-call GI fellow can be contacted via the [employer] service ([phone])

## 2021-06-17 NOTE — PROGRESS NOTE ADULT - ASSESSMENT
65M PMH ACC/AHA stage D HF due to NICM HM2 LVAD, TV annuloplasty ring 9/12/17 as destination therapy due to severe peripheral artery disease with significant stenosis SIADH, Depression, CKD-3 with hyperkalemia, past E. coli UTIs, driveline drainage (1/7/21) and COVID-19 (back in April 2020)  He was recently seen in clinic where he complained of abdominal pain and dark stools w constipation back in May. He presents to [facility] ER today weakness and fatigue, moderate and + Black stools for three days, on coumadin secondary to warfarin use in the setting of an LVAD.  Patient has required transfusions for GIB in the past. Mostly recently back in jan 2021 pt had anemia with dark stools. No interventions was done at that time. However Last Endoscopy was done in July 2020 (negative). Today labs show patient is anemic with H/H of 4.5/16.3,. INR is 8.84 MAP in the 90s, Returned from endoscopy appearing ill tachypneic with chills with new white out of his left lung    #Sepsis:  - Aspiration event vs esophageal tear at time of procedure. CXR shows LLL PNA  - Will need CT scan of chest/abd/pelvis once he stabilizes to see if perforation/tear  - Follow up sputum culture and BCx  - c/w Zosyn 3.375g q8h (6/16-)  - Follow up BAL (6/17)    #Leukocytosis:  - From sepsis, fluctuating, stable  - Trend CBC, VS    Jelani Meza MD, PGY4   ID fellow  Pager: [phone]  [facility] pager ID: [phone] (would prefer to text page for any new consult or question, please include name/location and best call back number)  After 5pm/weekends call [phone]

## 2021-06-17 NOTE — PROCEDURE NOTE - ADDITIONAL PROCEDURE DETAILS
Indication:    Pre-op Dx:    History:    Preop medication:    Xray Findings:    Findings:  Bronchoscope inserted through ETT. ETT noted to be in good position. Airway evaluation revealed Sharp Shandra. ISABELLE and LLL evaluation revealed [id number]. RUL, RML, RLL revealed [id number]. . Bronchoscope then withdrawn from ETT. Minimal bleeding noted    Specimens: Indication: worsening CXR, increased secretions    Findings:  Bronchoscope inserted through ETT. ETT noted to be at bud and pulled back 2cm. Airway evaluation revealed Sharp Bud. ISABELLE and LLL evaluation revealed copious blood tinged secretions which was lavaged with normal saline and suctioned with multiple passes until clear. Right side had some minor thick blood tinged secretions. Bronchoscope then withdrawn from ETT. Minimal bleeding noted    Specimens:  BAL sent.

## 2021-06-17 NOTE — PROGRESS NOTE ADULT - ATTENDING COMMENTS
Pulmonary infiltrate and vent requirements improving. Will plan for bronchoscopy today. Potential extubation tomorrow. Cultures so far negative and minimal pressor requirements likely from sedation.

## 2021-06-17 NOTE — PROGRESS NOTE ADULT - ATTENDING COMMENTS
Agree with above. H/H stable, no signs of active bleeding. Ok to resume anticoagulation with careful monitoring.

## 2021-06-17 NOTE — PROGRESS NOTE ADULT - PROBLEM SELECTOR PLAN 2
- respiratory culture NGTD and blood cultures in lab  - check procalcitonin   - plan for BAL and repeat sputum cxs  - ID following, continue empiric zosyn for now  - wean pressors as tolerates, may improve as sedation comes off - respiratory and blood cultures NGTD  - check procalcitonin   - plan for BAL and repeat sputum cxs  - ID following, continue empiric zosyn for now  - wean pressors as tolerates, may improve as sedation comes off

## 2021-06-17 NOTE — PROGRESS NOTE ADULT - SUBJECTIVE AND OBJECTIVE BOX
Follow Up: sepsis    Interval History/ROS:Patient is a 66y old  Male who presents with a chief complaint of Anemia, Supratherapeutic INR, Dark Stools (2021 13:41)  - Pt just had BAL today, heavily sedated now    REVIEW OF SYSTEMS  [ X ] ROS unobtainable because:  intubated  [  ] All other systems negative except as noted below    Constitutional:  [ ] fever [ ] chills  [ ] weight loss  [ ]night sweat  [ ]poor appetite/PO intake [ ]fatigue   Skin:  [ ] rash [ ] phlebitis	  Eyes: [ ] icterus [ ] pain  [ ] discharge	  ENMT: [ ] sore throat  [ ] thrush [ ] ulcers [ ] exudates [ ]anosmia  Respiratory: [ ] dyspnea [ ] hemoptysis [ ] cough [ ] sputum	  Cardiovascular:  [ ] chest pain [ ] palpitations [ ] edema	  Gastrointestinal:  [ ] nausea [ ] vomiting [ ] diarrhea [ ] constipation [ ] pain	  Genitourinary:  [ ] dysuria [ ] frequency [ ] hematuria [ ] discharge [ ] flank pain  [ ] incontinence  Musculoskeletal:  [ ] myalgias [ ] arthralgias [ ] arthritis  [ ] back pain  Neurological:  [ ] headache [ ] weakness [ ] seizures  [ ] confusion/altered mental status    Allergies  No Known Allergies        ANTIMICROBIALS:    piperacillin/tazobactam IVPB.. 3.375 every 8 hours      OTHER MEDS: MEDICATIONS  (STANDING):  acetaminophen    Suspension .. 650 every 6 hours PRN  aMIOdarone    Tablet 200 daily  dexMEDEtomidine Infusion 0.5 <Continuous>  dextrose 50% Injectable 50 every 15 minutes  EPINEPHrine    Infusion 0.02 <Continuous>  furosemide   Injectable 20 once  heparin  Infusion 400 <Continuous>  insulin regular Infusion 3 <Continuous>  norepinephrine Infusion 0.05 <Continuous>  pantoprazole  Injectable 40 every 12 hours  propofol Infusion 20 <Continuous>  vasopressin Infusion 0.033 <Continuous>      Vital Signs Last 24 Hrs  T(F): 101.3 (21 @ 12:45), Max: 101.7 (06-15-21 @ 20:00)    Vital Signs Last 24 Hrs  HR: 72 (21 @ 13:15) (61 - 76)  BP: --  RR: 20 (21 @ 13:15)  SpO2: 100% (21 @ 13:15) (90% - 100%)  Wt(kg): --    EXAM:  GA: intubated, sedated  HEENT: NG+, Right IJ central line  CV: mechanical sounds  Lungs: clear  Abd: LVAD wire site nontender  Ext: no edema  Right radial A-line  Landrum+      Labs:                        8.3    16.61 )-----------( 118      ( 2021 01:36 )             25.9         130<L>  |  97  |  14  ----------------------------<  136<H>  4.8   |  19<L>  |  0.92    Ca    8.6      2021 01:36  Phos  2.0       Mg     2.8         TPro  6.7  /  Alb  4.1  /  TBili  1.0  /  DBili  x   /  AST  23  /  ALT  9<L>  /  AlkPhos  54        WBC Trend:  WBC Count: 16.61 (21 @ 01:36)  WBC Count: 18.65 (21 @ 18:43)  WBC Count: 16.18 (21 @ 14:17)  WBC Count: 12.67 (21 @ 04:18)      Creatine Trend:  Creatinine, Serum: 0.92 ()  Creatinine, Serum: 1.07 ()  Creatinine, Serum: 1.23 (06-15)  Creatinine, Serum: 1.66 ()      Liver Biochemical Testing Trend:  Alanine Aminotransferase (ALT/SGPT): 9 *L* ()  Alanine Aminotransferase (ALT/SGPT): 8 *L* ()  Alanine Aminotransferase (ALT/SGPT): 8 *L* (06-15)  Alanine Aminotransferase (ALT/SGPT): 7 *L* ()  Alanine Aminotransferase (ALT/SGPT): 46 *H* ()  Aspartate Aminotransferase (AST/SGOT): 23 (21 @ 01:36)  Aspartate Aminotransferase (AST/SGOT): 17 (21 @ 01:18)  Aspartate Aminotransferase (AST/SGOT): 18 (06-15-21 @ 00:17)  Aspartate Aminotransferase (AST/SGOT): 18 (21 @ 12:25)  Aspartate Aminotransferase (AST/SGOT): 58 (21 @ 05:07)  Bilirubin Total, Serum: 1.0 (-17)  Bilirubin Total, Serum: 0.8 (16)  Bilirubin Total, Serum: 0.6 (15)  Bilirubin Total, Serum: 0.2 ()  Bilirubin Total, Serum: 0.2 ()      Trend LDH  21 @ 01:36  297<H>  21 @ 01:18  272<H>  06-15-21 @ 21:01  283<H>  06-15-21 @ 00:17  304<H>  21 @ 12:25  443<H>      Urinalysis Basic - ( 15 Jamil 2021 18:54 )  Color: Light Yellow / Appearance: Clear / S.018 / pH: x  Gluc: x / Ketone: Small  / Bili: Negative / Urobili: Negative   Blood: x / Protein: Trace / Nitrite: Negative   Leuk Esterase: Negative / RBC: 1 /hpf / WBC 0 /HPF   Sq Epi: x / Non Sq Epi: 1 /hpf / Bacteria: Negative        MICROBIOLOGY:    Culture - Sputum (collected 2021 16:28)  Source: .Sputum Sputum    Culture - Sputum (collected 2021 08:20)  Source: .Sputum Sputum  Preliminary Report:    No growth to date.    Culture - Blood (collected 15 Jamil 2021 22:04)  Source: .Blood Blood-Peripheral  Preliminary Report:    No growth to date.    Culture - Blood (collected 15 Jamil 2021 22:04)  Source: .Blood Blood-Peripheral  Preliminary Report:    No growth to date.    Culture - Blood (collected 2021 23:00)  Source: .Blood Blood-Venous  Final Report:    No Growth Final    Culture - Blood (collected 2021 23:00)  Source: .Blood Blood-Venous  Final Report:    No Growth Final    Culture - Blood (collected 2021 16:36)  Source: .Blood Blood  Final Report:    Growth in anaerobic bottle: Propionibacterium acnes also known as,    Cutibacterium acnes    "Susceptibilities not performed"    ***Blood Panel PCR results on this specimen are available    approximately 3 hours after the Gram stain result.***    Gram stain,PCR, and/or culture results may not always    correspond due to difference in methodologies.    ************************************************************    This PCR assay was performed by multiplex PCR. This    Assay tests for 66 bacterial and resistancegene targets.    Please refer to the [facility] [employer] test directory    at [email or web address] for details.  Organism: Blood Culture PCR  Organism: Blood Culture PCR    Sensitivities:      -  Cutibacterium acnes: Detec      Method Type: PCR    Culture - Blood (collected 2021 16:36)  Source: .Blood Blood  Final Report:    No Growth Final    Culture - Blood (collected 15 Jul 2020 00:59)  Source: .Blood Blood-Peripheral  Final Report:    No Growth Final    Culture - Blood (collected 2020 14:49)  Source: .Blood Blood-Peripheral  Final Report:    No Growth Final      HIV-1/2 Combo Result: Nonreact (21 @ 08:18)  ABS CD4: 129 /uL (20 @ 08:38)  COVID-19 PCR: NotDetec (21 @ 12:24)    COVID-19 [place] Domain AB Interp: Positive (06-15-21 @ 10:15)  COVID-19 IgG Antibody Interpretation: Positive (21 @ 08:55)  COVID-19 IgG Antibody Interpretation: Positive (20 @ 08:50)    Blood Gas Arterial, Lactate: 1.1 ( @ 10:41)  Blood Gas Arterial, Lactate: 1.1 ( @ 09:42)  Blood Gas Arterial, Lactate: 1.1 ( @ 04:50)  Blood Gas Arterial, Lactate: 1.2 ( @ 01:01)    RADIOLOGY:  imaging below personally reviewed    < from: Xray Chest 1 View- PORTABLE-Urgent (Xray Chest 1 View- PORTABLE-Urgent .) (21 @ 13:12) >  IMPRESSION:  Stable mild cardiomegaly.  S/P sternotomy; heart valve ring.  LVAD in situ.  Right Cordis sheath tip in SVC.  Enteric tube tip in stomach.  Pulmonary vascular congestive changes seen.  Small bilateral pleural effusions. Basilar haziness maybe due to atelectasis-but underlying pneumonia not excluded in the right clinical setting.  No pneumothorax.    < end of copied text >

## 2021-06-17 NOTE — PROGRESS NOTE ADULT - SUBJECTIVE AND OBJECTIVE BOX
Patient seen and examined at bedside.    Overnight Events: NAEO. This AM, pt remains intubated/sedated.    Review Of Systems: No chest pain, shortness of breath, or palpitations            Current Meds:  acetaminophen    Suspension .. 650 milliGRAM(s) Oral every 6 hours PRN  aMIOdarone    Tablet 200 milliGRAM(s) Oral daily  chlorhexidine 0.12% Liquid 15 milliLiter(s) Oral Mucosa every 12 hours  chlorhexidine 2% Cloths 1 Application(s) Topical <User Schedule>  dexMEDEtomidine Infusion 0.5 MICROgram(s)/kG/Hr IV Continuous <Continuous>  dextrose 50% Injectable 50 milliLiter(s) IV Push every 15 minutes  EPINEPHrine    Infusion 0.02 MICROgram(s)/kG/Min IV Continuous <Continuous>  furosemide   Injectable 20 milliGRAM(s) IV Push once  heparin  Infusion 400 Unit(s)/Hr IV Continuous <Continuous>  insulin regular Infusion 3 Unit(s)/Hr IV Continuous <Continuous>  norepinephrine Infusion 0.05 MICROgram(s)/kG/Min IV Continuous <Continuous>  pantoprazole  Injectable 40 milliGRAM(s) IV Push every 12 hours  piperacillin/tazobactam IVPB.. 3.375 Gram(s) IV Intermittent every 8 hours  propofol Infusion 20 MICROgram(s)/kG/Min IV Continuous <Continuous>  sodium chloride 0.9% lock flush 3 milliLiter(s) IV Push every 8 hours  sodium chloride 0.9%. 1000 milliLiter(s) IV Continuous <Continuous>  vasopressin Infusion 0.033 Unit(s)/Min IV Continuous <Continuous>      Vitals:  T(F): 101.3 (06-17), Max: 101.3 (06-17)  HR: 72 (06-17) (61 - 76)  BP: --  RR: 20 (06-17)  SpO2: 100% (06-17)  I&O's Summary    16 Jun 2021 07:01  -  17 Jun 2021 07:00  --------------------------------------------------------  IN: 2875.6 mL / OUT: 2825 mL / NET: 50.6 mL    17 Jun 2021 07:01  -  17 Jun 2021 13:41  --------------------------------------------------------  IN: 471.7 mL / OUT: 160 mL / NET: 311.7 mL        Physical Exam:  Gen: Critically ill pt.  HEENT: NCAT. ETT in place. NGT in place.  Neck: No JVP elev.  CV: Normal S1, S2. RRR. No MRG.  Chest: CTAB. No WRR.  Abd: +BSx4. Soft. NTND.  Ext: No LE edema.  Skin: No cyanosis.                          8.3    16.61 )-----------( 118      ( 17 Jun 2021 01:36 )             25.9     06-17    130<L>  |  97  |  14  ----------------------------<  136<H>  4.8   |  19<L>  |  0.92    Ca    8.6      17 Jun 2021 01:36  Phos  2.0     06-17  Mg     2.8     06-17    TPro  6.7  /  Alb  4.1  /  TBili  1.0  /  DBili  x   /  AST  23  /  ALT  9<L>  /  AlkPhos  54  06-17    PT/INR - ( 17 Jun 2021 01:36 )   PT: 15.4 sec;   INR: 1.30 ratio         PTT - ( 17 Jun 2021 05:08 )  PTT:41.7 sec

## 2021-06-17 NOTE — PROGRESS NOTE ADULT - PROBLEM SELECTOR PLAN 1
- wean ventilator as tolerates  - sedation vacation and wean as per CTU - wean ventilator as tolerates  - sedation vacation and wean as per CTU  - please obtain bronchoscopy

## 2021-06-18 NOTE — PROGRESS NOTE ADULT - PROBLEM SELECTOR PLAN 4
- Continue with current speed (8800 rpm), if further low flow alarms will increase LVAD speed  - Pt slightly VOl today, would trial gentle diuresis over next 24 hrs  - c/w heparin but would target lower PTT goal at this time. Eventual resumption of warfarin   - Will plan to hold aspirin indefinitely  - Continue to monitor LDH daily. - Continue with current speed (8800 rpm), will have low threshold to increase speed given echo but clinically improving overall  - Pt slightly VOl today, would trial gentle diuresis over next 24 hrs  - c/w heparin but would target lower PTT goal at this time. Eventual resumption of warfarin   - Will plan to hold aspirin indefinitely  - Continue to monitor LDH daily.

## 2021-06-18 NOTE — PROGRESS NOTE ADULT - SUBJECTIVE AND OBJECTIVE BOX
RICKY HAMPTON  MRN-[id number]  Patient is a 66y old  Male who presents with a chief complaint of Anemia, Supratherapeutic INR, Dark Stools (2021 13:55)    HPI:  65M PMH ACC/AHA stage D HF due to NICM HM2 LVAD , TV annuloplasty ring 17 as destination therapy due to severe peripheral artery disease with significant stenosis  SIADH, Depression, CKD-3 with hyperkalemia, past E. coli UTIs, driveline drainage (21) and COVID-19 (back in 2020)  He was recently seen in clinic where he complained of abdominal pain and dark stools w constipation back in May. He presents to [facility] ER today weakness and fatigue, moderate and + Black stools for three days, on coumadin secondary to warfarin use in the setting of an LVAD. Patient has required transfusions for GIB in the past. Mostly recently back in 2021 pt had anemia with dark stools. No interventions was done at that time. However Last Endoscopy was done in 2020 (negative). Today labs show patient is anemic with H/H of 4.5/16.3,. INR is 8.84 MAP in the 90s, Temp 35.1. He denies any chest pain, shortness of breath, dizziness, abd pain, nausea or vomiting.       (2021 16:57)      Surgery/Hospital course:   admit for melena w/ anemia, INR 8.84   6/15 Capsul study (+) for small bowel bleed, balloon endoscopy (old blood in prox ileum); post EGD - septic w/ L opacity, re-intubated for concern for aspiration, TTE (Mod MR, decrease biV w/ interventricular septum boweing towards R)   bronch     REVIEW OF SYSTEMS:  Unable to obtain due to patient being intubated    Vital Signs Last 24 Hrs  T(C): 38.7 (2021 04:00), Max: 38.7 (2021 04:00)  T(F): 101.7 (2021 04:00), Max: 101.7 (2021 04:00)  HR: 62 (2021 06:15) (61 - 76)  BP: --  BP(mean): --  RR: 20 (2021 06:15) (20 - 47)  SpO2: 100% (2021 06:15) (94% - 100%)    ============================I/O===========================   I&O's Detail    2021 07:01  -  2021 07:00  --------------------------------------------------------  IN:    Albumin 5%  - 250 mL: 500 mL    Dexmedetomidine: 275.3 mL    DOBUTamine: 18.8 mL    DOBUTamine: 14.1 mL    Enteral Tube Flush: 110 mL    EPINEPHrine: 5.9 mL    EPINEPHrine: 135.7 mL    FentaNYL: 2 mL    Heparin: 116 mL    Insulin: 46 mL    IV PiggyBack: 862.5 mL    Miscellaneous Tube Feedin mL    Norepinephrine: 42.2 mL    Propofol: 175.1 mL    sodium chloride 0.9%: 240 mL    Vasopressin: 92 mL  Total IN: 2875.6 mL    OUT:    Voided (mL): 2825 mL  Total OUT: 2825 mL    Total NET: 50.6 mL      2021 07:01  -  2021 06:16  --------------------------------------------------------  IN:    Dexmedetomidine: 287 mL    EPINEPHrine: 38.3 mL    EPINEPHrine: 5.8 mL    Heparin: 139.5 mL    Insulin: 39 mL    IV PiggyBack: 362.5 mL    Miscellaneous Tube Feedin mL    Propofol: 249.8 mL    sodium chloride 0.9%: 170 mL    Vasopressin: 30 mL  Total IN: 1711.9 mL    OUT:    Emesis (mL): 400 mL    Norepinephrine: 0 mL    Voided (mL): 2325 mL  Total OUT: 2725 mL    Total NET: -1013.1 mL        ============================ LABS =========================                        8.5    14.79 )-----------( 131      ( 2021 00:17 )             27.1     06-18    128<L>  |  98  |  17  ----------------------------<  144<H>  4.4   |  19<L>  |  0.89    Ca    9.0      2021 00:17  Phos  1.7     06-18  Mg     2.0     06-18    TPro  6.9  /  Alb  4.0  /  TBili  1.1  /  DBili  x   /  AST  31  /  ALT  18  /  AlkPhos  102  06-18    LIVER FUNCTIONS - ( 2021 00:17 )  Alb: 4.0 g/dL / Pro: 6.9 g/dL / ALK PHOS: 102 U/L / ALT: 18 U/L / AST: 31 U/L / GGT: x           PT/INR - ( 2021 01:36 )   PT: 15.4 sec;   INR: 1.30 ratio         PTT - ( 2021 00:17 )  PTT:35.4 sec  ABG - ( 2021 05:35 )  pH, Arterial: 7.39  pH, Blood: x     /  pCO2: 34    /  pO2: 92    / HCO3: 20    / Base Excess: -3.6  /  SaO2: 97                  ======================Microbiology/Radiology=================  Culture: Reviewed   CXR: Reviewed  ======================================================  PAST MEDICAL & SURGICAL HISTORY:  CHF (congestive heart failure)    CAD (coronary artery disease)    Depression    Pleural effusion    History of 2019 novel coronavirus disease (COVID-19)  2020    Hemorrhoids    Bleeding hemorrhoids    Peripheral arterial disease    Claudication    BPH with urinary obstruction    ACC/AHA stage D systolic heart failure    Anticoagulation goal of INR 2.0 to 2.5    Falls    Clavicle fracture    CKD (chronic kidney disease), stage III    Iron deficiency anemia    H/O epistaxis    Vertigo    GI bleed    S/P TVR (tricuspid valve repair)    S/P ventricular assist device    S/P endoscopy      ====================ASSESSMENT ==============  Stage D Nonischemic Cardiomyopathy, Status Post HM2 on 2017   Recent driveline infection on 2021   Acute hypoxemic respiratory failure  aspiration pneumonia  septic shock  cardiogenic shock  GI bleed , Status Post Enteroscopy   Anemia, in setting of melena   Chronic Kidney Disease  Thrombocytopenia   Leukocytosis  coagulopathy   Stress hyperglycemia   Hemodynamic instability   Hypertension     Plan:  ====================== NEUROLOGY=====================  Sedated with IV Precedex and IV Propofol to maintain vent synchrony  Tylenol PRN fevers     acetaminophen    Suspension .. 650 milliGRAM(s) Oral every 6 hours PRN Temp greater or equal to 38C (100.4F)  dexMEDEtomidine Infusion 0.5 MICROgram(s)/kG/Hr (9.81 mL/Hr) IV Continuous <Continuous>  propofol Infusion 20 MICROgram(s)/kG/Min (9.42 mL/Hr) IV Continuous <Continuous>    ==================== RESPIRATORY======================  Intubated post endoscopy on 6/15 for concern for aspiration in setting of sepsis w/ L opacity   Respiratory status required full ventilatory support, close monitoring of respiratory rate and breathing pattern, the following of ABG’s with A-line monitoring, continuous pulse oximetry monitoring    Mechanical Ventilation:  Mode: AC/ CMV (Assist Control/ Continuous Mandatory Ventilation)  RR (machine): 20  TV (machine): 500  FiO2: 40  PEEP: 5  ITime: 1  MAP: 13  PIP: 35      ====================CARDIOVASCULAR==================  Stage D NICM, s/p HM2 on 2017; recent driveline infection on 2021; HM2 settings: 8800rpm, flow 4.4   Admitted for anemia with supratherapeutic INR 8.84  Echo 6/15: severe global LV systolic dysfunction with HM2 in place, decreased RV systolic function, interventricular septums bows slightly to right, otherwise grossly similar to prior   Continue invasive hemodynamic monitoring   Rate control with Amiodarone   IV Epinephrine wean to off yesterday   On pressor support with IV Levophed and IV Vasopressin     aMIOdarone    Tablet 200 milliGRAM(s) Oral daily  norepinephrine Infusion 0.05 MICROgram(s)/kG/Min (7.36 mL/Hr) IV Continuous <Continuous>  vasopressin Infusion 0.033 Unit(s)/Min (2 mL/Hr) IV Continuous <Continuous>    ===================HEMATOLOGIC/ONC ===================  Anemia in setting of melena and supratherapeutic INR of 8.84 on admission   Continue AC therapy with IV Heparin for afib   INR 1.30 now, will continue to monitor closely.   Monitor H&H/Plts     heparin  Infusion 400 Unit(s)/Hr (6.5 mL/Hr) IV Continuous <Continuous>    ===================== RENAL =========================  Continue monitoring urine output, I&Os, Bun/Cr  Target even to slightly negative fluid balance  Monitor and replete lytes prn    ==================== GASTROINTESTINAL===================  Abdominal XR today: nonobstructive bowel gas pattern and large stool burden.  S/p capsule study notable for bleed in small bowel s/p balloon endoscopy on 6/15, notable for old blood in proximal ileum, no acute intervention indicated   Tolerating tube feeds, Vital AF @ 55cc/hr      GI prophylaxis, pantoprazole  Injectable 40 milliGRAM(s) IV Push every 12 hours  sodium chloride 0.9% lock flush 3 milliLiter(s) IV Push every 8 hours  sodium chloride 0.9%. 1000 milliLiter(s) (10 mL/Hr) IV Continuous <Continuous>    =======================    ENDOCRINE  =====================  Continue glucose control with IV insulin drip for stress hyperglycemia    dextrose 50% Injectable 50 milliLiter(s) IV Push every 15 minutes  insulin regular Infusion 3 Unit(s)/Hr (3 mL/Hr) IV Continuous <Continuous>    ========================INFECTIOUS DISEASE================  Temp 101.7F, WBC downtrending 16.61->14.79   Concern for sepsis +rigors with temp up to 101.0F on 6/15, c/w Zosyn   BCx 6/15 with NGTD, SCx  NGTD     piperacillin/tazobactam IVPB.. 3.375 Gram(s) IV Intermittent every 8 hours      TIME:     Patient requires continuous monitoring with bedside rhythm monitoring, pulse ox monitoring, and intermittent blood gas analysis. Care plan discussed with ICU care team. Patient remained critical and at risk for life threatening decompensation.    By signing my name below, I, Agnieszka Cherry, attest that this documentation has been prepared under the direction and in the presence of Jus Jensen MD   Electronically signed: Cesia Shaffer Matthew Pierce, personally performed the services described in this documentation. all medical record entries made by the scribe were at my direction and in my presence. I have reviewed the chart and agree that the record reflects my personal performance and is accurate and complete  Electronically signed: Jus Jensen MD 21 @ 06:16       RICKY HAMPTON  MRN-[id number]  Patient is a 66y old  Male who presents with a chief complaint of Anemia, Supratherapeutic INR, Dark Stools (2021 13:55)    HPI:  65M PMH ACC/AHA stage D HF due to NICM HM2 LVAD , TV annuloplasty ring 17 as destination therapy due to severe peripheral artery disease with significant stenosis  SIADH, Depression, CKD-3 with hyperkalemia, past E. coli UTIs, driveline drainage (21) and COVID-19 (back in 2020)  He was recently seen in clinic where he complained of abdominal pain and dark stools w constipation back in May. He presents to [facility] ER today weakness and fatigue, moderate and + Black stools for three days, on coumadin secondary to warfarin use in the setting of an LVAD. Patient has required transfusions for GIB in the past. Mostly recently back in 2021 pt had anemia with dark stools. No interventions was done at that time. However Last Endoscopy was done in 2020 (negative). Today labs show patient is anemic with H/H of 4.5/16.3,. INR is 8.84 MAP in the 90s, Temp 35.1. He denies any chest pain, shortness of breath, dizziness, abd pain, nausea or vomiting.       (2021 16:57)      Surgery/Hospital course:   admit for melena w/ anemia, INR 8.84   6/15 Capsul study (+) for small bowel bleed, balloon endoscopy (old blood in prox ileum); post EGD - septic w/ L opacity, re-intubated for concern for aspiration, TTE (Mod MR, decrease biV w/ interventricular septum boweing towards R)   bronch     REVIEW OF SYSTEMS:  Unable to obtain due to patient being intubated    Vital Signs Last 24 Hrs  T(C): 38.7 (2021 04:00), Max: 38.7 (2021 04:00)  T(F): 101.7 (2021 04:00), Max: 101.7 (2021 04:00)  HR: 62 (2021 06:15) (61 - 76)  BP: --  BP(mean): --  RR: 20 (2021 06:15) (20 - 47)  SpO2: 100% (2021 06:15) (94% - 100%)    ============================I/O===========================   I&O's Detail    2021 07:01  -  2021 07:00  --------------------------------------------------------  IN:    Albumin 5%  - 250 mL: 500 mL    Dexmedetomidine: 275.3 mL    DOBUTamine: 18.8 mL    DOBUTamine: 14.1 mL    Enteral Tube Flush: 110 mL    EPINEPHrine: 5.9 mL    EPINEPHrine: 135.7 mL    FentaNYL: 2 mL    Heparin: 116 mL    Insulin: 46 mL    IV PiggyBack: 862.5 mL    Miscellaneous Tube Feedin mL    Norepinephrine: 42.2 mL    Propofol: 175.1 mL    sodium chloride 0.9%: 240 mL    Vasopressin: 92 mL  Total IN: 2875.6 mL    OUT:    Voided (mL): 2825 mL  Total OUT: 2825 mL    Total NET: 50.6 mL      2021 07:01  -  2021 06:16  --------------------------------------------------------  IN:    Dexmedetomidine: 287 mL    EPINEPHrine: 38.3 mL    EPINEPHrine: 5.8 mL    Heparin: 139.5 mL    Insulin: 39 mL    IV PiggyBack: 362.5 mL    Miscellaneous Tube Feedin mL    Propofol: 249.8 mL    sodium chloride 0.9%: 170 mL    Vasopressin: 30 mL  Total IN: 1711.9 mL    OUT:    Emesis (mL): 400 mL    Norepinephrine: 0 mL    Voided (mL): 2325 mL  Total OUT: 2725 mL    Total NET: -1013.1 mL        ============================ LABS =========================                        8.5    14.79 )-----------( 131      ( 2021 00:17 )             27.1     06-18    128<L>  |  98  |  17  ----------------------------<  144<H>  4.4   |  19<L>  |  0.89    Ca    9.0      2021 00:17  Phos  1.7     06-18  Mg     2.0     06-18    TPro  6.9  /  Alb  4.0  /  TBili  1.1  /  DBili  x   /  AST  31  /  ALT  18  /  AlkPhos  102  06-18    LIVER FUNCTIONS - ( 2021 00:17 )  Alb: 4.0 g/dL / Pro: 6.9 g/dL / ALK PHOS: 102 U/L / ALT: 18 U/L / AST: 31 U/L / GGT: x           PT/INR - ( 2021 01:36 )   PT: 15.4 sec;   INR: 1.30 ratio         PTT - ( 2021 00:17 )  PTT:35.4 sec  ABG - ( 2021 05:35 )  pH, Arterial: 7.39  pH, Blood: x     /  pCO2: 34    /  pO2: 92    / HCO3: 20    / Base Excess: -3.6  /  SaO2: 97                  ======================Microbiology/Radiology=================  Culture: Reviewed   CXR: Reviewed  ======================================================  PAST MEDICAL & SURGICAL HISTORY:  CHF (congestive heart failure)    CAD (coronary artery disease)    Depression    Pleural effusion    History of 2019 novel coronavirus disease (COVID-19)  2020    Hemorrhoids    Bleeding hemorrhoids    Peripheral arterial disease    Claudication    BPH with urinary obstruction    ACC/AHA stage D systolic heart failure    Anticoagulation goal of INR 2.0 to 2.5    Falls    Clavicle fracture    CKD (chronic kidney disease), stage III    Iron deficiency anemia    H/O epistaxis    Vertigo    GI bleed    S/P TVR (tricuspid valve repair)    S/P ventricular assist device    S/P endoscopy      ====================ASSESSMENT ==============  Stage D Nonischemic Cardiomyopathy, Status Post HM2 on 2017   Recent driveline infection on 2021   Acute hypoxemic respiratory failure  aspiration pneumonia  septic shock  cardiogenic shock  GI bleed , Status Post Enteroscopy   Anemia, in setting of melena   Chronic Kidney Disease  Thrombocytopenia   Leukocytosis  coagulopathy   Stress hyperglycemia   Hemodynamic instability   Hypertension     Plan:  ====================== NEUROLOGY=====================  Sedated with IV Precedex and IV Propofol to maintain vent synchrony, wean Propofol as tolerated   Tylenol PRN fevers     acetaminophen    Suspension .. 650 milliGRAM(s) Oral every 6 hours PRN Temp greater or equal to 38C (100.4F)  dexMEDEtomidine Infusion 1.0 MICROgram(s)/kG/Hr (9.81 mL/Hr) IV Continuous <Continuous>  propofol Infusion 40 MICROgram(s)/kG/Min (9.42 mL/Hr) IV Continuous <Continuous>    ==================== RESPIRATORY======================  Intubated post endoscopy on 6/15 for concern for aspiration in setting of sepsis w/ L opacity   Respiratory status required full ventilatory support, close monitoring of respiratory rate and breathing pattern, the following of ABG’s with A-line monitoring, continuous pulse oximetry monitoring  Plan to US L chest to evaluate for fluid collection     Mechanical Ventilation:  Mode: AC/ CMV (Assist Control/ Continuous Mandatory Ventilation)  RR (machine): 20  TV (machine): 500  FiO2: 40  PEEP: 5  ITime: 1  MAP: 13  PIP: 35      ====================CARDIOVASCULAR==================  Stage D NICM, s/p HM2 on 2017; recent driveline infection on 2021; HM2 settings: 8800rpm, flow 4.4   Admitted for anemia with supratherapeutic INR 8.84  Echo 6/15: severe global LV systolic dysfunction with HM2 in place, decreased RV systolic function, interventricular septums bows slightly to right, otherwise grossly similar to prior   Continue invasive hemodynamic monitoring   Rate control with Amiodarone   IV Epinephrine wean to off yesterday   On pressor support with IV Levophed and IV Vasopressin   S/p echo today, will f/u results     aMIOdarone    Tablet 200 milliGRAM(s) Oral daily  norepinephrine Infusion 0.05 MICROgram(s)/kG/Min (7.36 mL/Hr) IV Continuous <Continuous>  vasopressin Infusion 0.033 Unit(s)/Min (2 mL/Hr) IV Continuous <Continuous>    ===================HEMATOLOGIC/ONC ===================  Anemia in setting of melena and supratherapeutic INR of 8.84 on admission   Continue AC therapy with IV Heparin for afib, PTT goal 40-50   INR 1.30 now, will continue to monitor closely   Monitor H&H/Plts     heparin  Infusion 400 Unit(s)/Hr (6.5 mL/Hr) IV Continuous <Continuous>    ===================== RENAL =========================  Continue monitoring urine output, I&Os, Bun/Cr  Target even to slightly negative fluid balance  Monitor and replete lytes prn    ==================== GASTROINTESTINAL===================  Abdominal XR today: nonobstructive bowel gas pattern and large stool burden.  S/p capsule study notable for bleed in small bowel s/p balloon endoscopy on 6/15, notable for old blood in proximal ileum, no acute intervention indicated   Episodes of emesis early in the AM, now NPO, with intermittent tube feeds, Vital AF @ 55cc/hr    F/u with GI re: concern for ?perforation mentioned previously, ?CT?  Plan to start Reglan for gut motility     GI prophylaxis, pantoprazole  Injectable 40 milliGRAM(s) IV Push every 12 hours  sodium chloride 0.9% lock flush 3 milliLiter(s) IV Push every 8 hours  sodium chloride 0.9%. 1000 milliLiter(s) (10 mL/Hr) IV Continuous <Continuous>    =======================    ENDOCRINE  =====================  Continue glucose control with IV insulin drip for stress hyperglycemia    dextrose 50% Injectable 50 milliLiter(s) IV Push every 15 minutes  insulin regular Infusion 3 Unit(s)/Hr (3 mL/Hr) IV Continuous <Continuous>    ========================INFECTIOUS DISEASE================  Temp 101.7F, WBC downtrending 16.61->14.79   Concern for sepsis +rigors with temp up to 101.0F on 6/15, c/w Zosyn   BCx 6/15 with NGTD, SCx  NGTD     piperacillin/tazobactam IVPB.. 3.375 Gram(s) IV Intermittent every 8 hours      TIME:     Patient requires continuous monitoring with bedside rhythm monitoring, pulse ox monitoring, and intermittent blood gas analysis. Care plan discussed with ICU care team. Patient remained critical and at risk for life threatening decompensation.    By signing my name below, I, Agnieszka Cherry, attest that this documentation has been prepared under the direction and in the presence of Jus Jensen MD   Electronically signed: Cesia Shaffer    I, Jus Jensen, personally performed the services described in this documentation. all medical record entries made by the scribe were at my direction and in my presence. I have reviewed the chart and agree that the record reflects my personal performance and is accurate and complete  Electronically signed: Jus Jensen MD 21 @ 06:16       RICKY HAMPTON  MRN-[id number]  Patient is a 66y old  Male who presents with a chief complaint of Anemia, Supratherapeutic INR, Dark Stools (2021 13:55)    HPI:  65M PMH ACC/AHA stage D HF due to NICM HM2 LVAD , TV annuloplasty ring 17 as destination therapy due to severe peripheral artery disease with significant stenosis  SIADH, Depression, CKD-3 with hyperkalemia, past E. coli UTIs, driveline drainage (21) and COVID-19 (back in 2020)  He was recently seen in clinic where he complained of abdominal pain and dark stools w constipation back in May. He presents to [facility] ER today weakness and fatigue, moderate and + Black stools for three days, on coumadin secondary to warfarin use in the setting of an LVAD. Patient has required transfusions for GIB in the past. Mostly recently back in 2021 pt had anemia with dark stools. No interventions was done at that time. However Last Endoscopy was done in 2020 (negative). Today labs show patient is anemic with H/H of 4.5/16.3,. INR is 8.84 MAP in the 90s, Temp 35.1. He denies any chest pain, shortness of breath, dizziness, abd pain, nausea or vomiting.       (2021 16:57)      Surgery/Hospital course:   admit for melena w/ anemia, INR 8.84   6/15 Capsul study (+) for small bowel bleed, balloon endoscopy (old blood in prox ileum); post EGD - septic w/ L opacity, re-intubated for concern for aspiration, TTE (Mod MR, decrease biV w/ interventricular septum boweing towards R)   bronch     REVIEW OF SYSTEMS:  Unable to obtain due to patient being intubated    Vital Signs Last 24 Hrs  T(C): 38.7 (2021 04:00), Max: 38.7 (2021 04:00)  T(F): 101.7 (2021 04:00), Max: 101.7 (2021 04:00)  HR: 62 (2021 06:15) (61 - 76)  BP: --  BP(mean): --  RR: 20 (2021 06:15) (20 - 47)  SpO2: 100% (2021 06:15) (94% - 100%)    ============================I/O===========================   I&O's Detail    2021 07:01  -  2021 07:00  --------------------------------------------------------  IN:    Albumin 5%  - 250 mL: 500 mL    Dexmedetomidine: 275.3 mL    DOBUTamine: 18.8 mL    DOBUTamine: 14.1 mL    Enteral Tube Flush: 110 mL    EPINEPHrine: 5.9 mL    EPINEPHrine: 135.7 mL    FentaNYL: 2 mL    Heparin: 116 mL    Insulin: 46 mL    IV PiggyBack: 862.5 mL    Miscellaneous Tube Feedin mL    Norepinephrine: 42.2 mL    Propofol: 175.1 mL    sodium chloride 0.9%: 240 mL    Vasopressin: 92 mL  Total IN: 2875.6 mL    OUT:    Voided (mL): 2825 mL  Total OUT: 2825 mL    Total NET: 50.6 mL      2021 07:01  -  2021 06:16  --------------------------------------------------------  IN:    Dexmedetomidine: 287 mL    EPINEPHrine: 38.3 mL    EPINEPHrine: 5.8 mL    Heparin: 139.5 mL    Insulin: 39 mL    IV PiggyBack: 362.5 mL    Miscellaneous Tube Feedin mL    Propofol: 249.8 mL    sodium chloride 0.9%: 170 mL    Vasopressin: 30 mL  Total IN: 1711.9 mL    OUT:    Emesis (mL): 400 mL    Norepinephrine: 0 mL    Voided (mL): 2325 mL  Total OUT: 2725 mL    Total NET: -1013.1 mL        ============================ LABS =========================                        8.5    14.79 )-----------( 131      ( 2021 00:17 )             27.1     06-18    128<L>  |  98  |  17  ----------------------------<  144<H>  4.4   |  19<L>  |  0.89    Ca    9.0      2021 00:17  Phos  1.7     06-18  Mg     2.0     06-18    TPro  6.9  /  Alb  4.0  /  TBili  1.1  /  DBili  x   /  AST  31  /  ALT  18  /  AlkPhos  102  06-18    LIVER FUNCTIONS - ( 2021 00:17 )  Alb: 4.0 g/dL / Pro: 6.9 g/dL / ALK PHOS: 102 U/L / ALT: 18 U/L / AST: 31 U/L / GGT: x           PT/INR - ( 2021 01:36 )   PT: 15.4 sec;   INR: 1.30 ratio         PTT - ( 2021 00:17 )  PTT:35.4 sec  ABG - ( 2021 05:35 )  pH, Arterial: 7.39  pH, Blood: x     /  pCO2: 34    /  pO2: 92    / HCO3: 20    / Base Excess: -3.6  /  SaO2: 97                  ======================Microbiology/Radiology=================  Culture: Reviewed   CXR: Reviewed  ======================================================  PAST MEDICAL & SURGICAL HISTORY:  CHF (congestive heart failure)    CAD (coronary artery disease)    Depression    Pleural effusion    History of 2019 novel coronavirus disease (COVID-19)  2020    Hemorrhoids    Bleeding hemorrhoids    Peripheral arterial disease    Claudication    BPH with urinary obstruction    ACC/AHA stage D systolic heart failure    Anticoagulation goal of INR 2.0 to 2.5    Falls    Clavicle fracture    CKD (chronic kidney disease), stage III    Iron deficiency anemia    H/O epistaxis    Vertigo    GI bleed    S/P TVR (tricuspid valve repair)    S/P ventricular assist device    S/P endoscopy      ====================ASSESSMENT ==============  Stage D Nonischemic Cardiomyopathy, Status Post HM2 on 2017   Recent driveline infection on 2021   Acute hypoxemic respiratory failure  aspiration pneumonia  septic shock  cardiogenic shock  GI bleed , Status Post Enteroscopy   Anemia, in setting of melena   Chronic Kidney Disease  Thrombocytopenia   Leukocytosis  coagulopathy   Stress hyperglycemia   Hemodynamic instability   Hypertension     Plan:  ====================== NEUROLOGY=====================  Sedated with IV Precedex and IV Propofol to maintain vent synchrony, wean Propofol as tolerated   Tylenol PRN fevers     acetaminophen    Suspension .. 650 milliGRAM(s) Oral every 6 hours PRN Temp greater or equal to 38C (100.4F)  dexMEDEtomidine Infusion 1.0 MICROgram(s)/kG/Hr (9.81 mL/Hr) IV Continuous <Continuous>  propofol Infusion 40 MICROgram(s)/kG/Min (9.42 mL/Hr) IV Continuous <Continuous>    ==================== RESPIRATORY======================  Intubated post endoscopy on 6/15 for concern for aspiration in setting of sepsis w/ L opacity   Respiratory status required full ventilatory support, close monitoring of respiratory rate and breathing pattern, the following of ABG’s with A-line monitoring, continuous pulse oximetry monitoring  Plan to US L chest to evaluate for fluid collection     Mechanical Ventilation:  Mode: AC/ CMV (Assist Control/ Continuous Mandatory Ventilation)  RR (machine): 20  TV (machine): 500  FiO2: 40  PEEP: 5  ITime: 1  MAP: 13  PIP: 35      ====================CARDIOVASCULAR==================  Stage D NICM, s/p HM2 on 2017; recent driveline infection on 2021; HM2 settings: 8800rpm, flow 4.4   Admitted for anemia with supratherapeutic INR 8.84  Echo 6/15: severe global LV systolic dysfunction with HM2 in place, decreased RV systolic function, interventricular septums bows slightly to right, otherwise grossly similar to prior   Continue invasive hemodynamic monitoring   Rate control with Amiodarone   IV Epinephrine wean to off yesterday   On pressor support with IV Levophed and IV Vasopressin   S/p echo today, will f/u results     aMIOdarone    Tablet 200 milliGRAM(s) Oral daily  norepinephrine Infusion 0.05 MICROgram(s)/kG/Min (7.36 mL/Hr) IV Continuous <Continuous>  vasopressin Infusion 0.033 Unit(s)/Min (2 mL/Hr) IV Continuous <Continuous>    ===================HEMATOLOGIC/ONC ===================  Anemia in setting of melena and supratherapeutic INR of 8.84 on admission   Continue AC therapy with IV Heparin for afib, PTT goal 40-50   INR 1.30 now, will continue to monitor closely   Monitor H&H/Plts     heparin  Infusion 400 Unit(s)/Hr (6.5 mL/Hr) IV Continuous <Continuous>    ===================== RENAL =========================  Continue monitoring urine output, I&Os, Bun/Cr  Target even to slightly negative fluid balance  Monitor and replete lytes prn    ==================== GASTROINTESTINAL===================  Abdominal XR today: nonobstructive bowel gas pattern and large stool burden.  S/p capsule study notable for bleed in small bowel s/p balloon endoscopy on 6/15, notable for old blood in proximal ileum, no acute intervention indicated   Episodes of emesis early in the AM, now NPO, with intermittent tube feeds, Vital AF @ 55cc/hr    F/u with GI re: concern for ?perforation mentioned previously, ?CT?  Plan to start Reglan for gut motility     GI prophylaxis, pantoprazole  Injectable 40 milliGRAM(s) IV Push every 12 hours  sodium chloride 0.9% lock flush 3 milliLiter(s) IV Push every 8 hours  sodium chloride 0.9%. 1000 milliLiter(s) (10 mL/Hr) IV Continuous <Continuous>    =======================    ENDOCRINE  =====================  Continue glucose control with IV insulin drip for stress hyperglycemia    dextrose 50% Injectable 50 milliLiter(s) IV Push every 15 minutes  insulin regular Infusion 3 Unit(s)/Hr (3 mL/Hr) IV Continuous <Continuous>    ========================INFECTIOUS DISEASE================  Temp 101.7F, WBC downtrending 16.61->14.79   Concern for sepsis +rigors with temp up to 101.0F on 6/15, c/w Zosyn   BCx 6/15 with NGTD, SCx  NGTD     piperacillin/tazobactam IVPB.. 3.375 Gram(s) IV Intermittent every 8 hours      Patient requires continuous monitoring with bedside rhythm monitoring, pulse ox monitoring, and intermittent blood gas analysis. Care plan discussed with ICU care team. Patient remained critical and at risk for life threatening decompensation.    By signing my name below, I, Agnieszka Cherry, attest that this documentation has been prepared under the direction and in the presence of Jus Jensen MD   Electronically signed: Cesia Shaffer    IJus, personally performed the services described in this documentation. all medical record entries made by the scribe were at my direction and in my presence. I have reviewed the chart and agree that the record reflects my personal performance and is accurate and complete  Electronically signed: Jus Jensen MD 21 @ 06:16       RICKY HAMPTON  MRN-[id number]  Patient is a 66y old  Male who presents with a chief complaint of Anemia, Supratherapeutic INR, Dark Stools (2021 13:55)    HPI:  65M PMH ACC/AHA stage D HF due to NICM HM2 LVAD , TV annuloplasty ring 17 as destination therapy due to severe peripheral artery disease with significant stenosis  SIADH, Depression, CKD-3 with hyperkalemia, past E. coli UTIs, driveline drainage (21) and COVID-19 (back in 2020)  He was recently seen in clinic where he complained of abdominal pain and dark stools w constipation back in May. He presents to [facility] ER today weakness and fatigue, moderate and + Black stools for three days, on coumadin secondary to warfarin use in the setting of an LVAD. Patient has required transfusions for GIB in the past. Mostly recently back in 2021 pt had anemia with dark stools. No interventions was done at that time. However Last Endoscopy was done in 2020 (negative). Today labs show patient is anemic with H/H of 4.5/16.3,. INR is 8.84 MAP in the 90s, Temp 35.1. He denies any chest pain, shortness of breath, dizziness, abd pain, nausea or vomiting.       (2021 16:57)      Surgery/Hospital course:   admit for melena w/ anemia, INR 8.84   6/15 Capsul study (+) for small bowel bleed, balloon endoscopy (old blood in prox ileum); post EGD - septic w/ L opacity, re-intubated for concern for aspiration, TTE (Mod MR, decrease biV w/ interventricular septum boweing towards R)   bronch     REVIEW OF SYSTEMS:  Unable to obtain due to patient being intubated    Vital Signs Last 24 Hrs  T(C): 38.7 (2021 04:00), Max: 38.7 (2021 04:00)  T(F): 101.7 (2021 04:00), Max: 101.7 (2021 04:00)  HR: 62 (2021 06:15) (61 - 76)  BP: --  BP(mean): --  RR: 20 (2021 06:15) (20 - 47)  SpO2: 100% (2021 06:15) (94% - 100%)    ============================I/O===========================   I&O's Detail    2021 07:01  -  2021 07:00  --------------------------------------------------------  IN:    Albumin 5%  - 250 mL: 500 mL    Dexmedetomidine: 275.3 mL    DOBUTamine: 18.8 mL    DOBUTamine: 14.1 mL    Enteral Tube Flush: 110 mL    EPINEPHrine: 5.9 mL    EPINEPHrine: 135.7 mL    FentaNYL: 2 mL    Heparin: 116 mL    Insulin: 46 mL    IV PiggyBack: 862.5 mL    Miscellaneous Tube Feedin mL    Norepinephrine: 42.2 mL    Propofol: 175.1 mL    sodium chloride 0.9%: 240 mL    Vasopressin: 92 mL  Total IN: 2875.6 mL    OUT:    Voided (mL): 2825 mL  Total OUT: 2825 mL    Total NET: 50.6 mL      2021 07:01  -  2021 06:16  --------------------------------------------------------  IN:    Dexmedetomidine: 287 mL    EPINEPHrine: 38.3 mL    EPINEPHrine: 5.8 mL    Heparin: 139.5 mL    Insulin: 39 mL    IV PiggyBack: 362.5 mL    Miscellaneous Tube Feedin mL    Propofol: 249.8 mL    sodium chloride 0.9%: 170 mL    Vasopressin: 30 mL  Total IN: 1711.9 mL    OUT:    Emesis (mL): 400 mL    Norepinephrine: 0 mL    Voided (mL): 2325 mL  Total OUT: 2725 mL    Total NET: -1013.1 mL        ============================ LABS =========================                        8.5    14.79 )-----------( 131      ( 2021 00:17 )             27.1     06-18    128<L>  |  98  |  17  ----------------------------<  144<H>  4.4   |  19<L>  |  0.89    Ca    9.0      2021 00:17  Phos  1.7     06-18  Mg     2.0     06-18    TPro  6.9  /  Alb  4.0  /  TBili  1.1  /  DBili  x   /  AST  31  /  ALT  18  /  AlkPhos  102  06-18    LIVER FUNCTIONS - ( 2021 00:17 )  Alb: 4.0 g/dL / Pro: 6.9 g/dL / ALK PHOS: 102 U/L / ALT: 18 U/L / AST: 31 U/L / GGT: x           PT/INR - ( 2021 01:36 )   PT: 15.4 sec;   INR: 1.30 ratio         PTT - ( 2021 00:17 )  PTT:35.4 sec  ABG - ( 2021 05:35 )  pH, Arterial: 7.39  pH, Blood: x     /  pCO2: 34    /  pO2: 92    / HCO3: 20    / Base Excess: -3.6  /  SaO2: 97                  ======================Microbiology/Radiology=================  Culture: Reviewed   CXR: Reviewed  ======================================================  PAST MEDICAL & SURGICAL HISTORY:  CHF (congestive heart failure)    CAD (coronary artery disease)    Depression    Pleural effusion    History of 2019 novel coronavirus disease (COVID-19)  2020    Hemorrhoids    Bleeding hemorrhoids    Peripheral arterial disease    Claudication    BPH with urinary obstruction    ACC/AHA stage D systolic heart failure    Anticoagulation goal of INR 2.0 to 2.5    Falls    Clavicle fracture    CKD (chronic kidney disease), stage III    Iron deficiency anemia    H/O epistaxis    Vertigo    GI bleed    S/P TVR (tricuspid valve repair)    S/P ventricular assist device    S/P endoscopy      ====================ASSESSMENT ==============  Stage D Nonischemic Cardiomyopathy, Status Post HM2 on 2017   Recent driveline infection on 2021   Acute hypoxemic respiratory failure  aspiration pneumonia  septic shock  cardiogenic shock  GI bleed , Status Post Enteroscopy   Anemia, in setting of melena   Chronic Kidney Disease  Thrombocytopenia   Leukocytosis  coagulopathy   Stress hyperglycemia   Hemodynamic instability     Plan:  ====================== NEUROLOGY=====================  Sedated with IV Precedex and IV Propofol to maintain vent synchrony, wean Propofol as tolerated   Tylenol PRN fevers     acetaminophen    Suspension .. 650 milliGRAM(s) Oral every 6 hours PRN Temp greater or equal to 38C (100.4F)  dexMEDEtomidine Infusion 1.0 MICROgram(s)/kG/Hr (9.81 mL/Hr) IV Continuous <Continuous>  propofol Infusion 40 MICROgram(s)/kG/Min (9.42 mL/Hr) IV Continuous <Continuous>    ==================== RESPIRATORY======================  Intubated post endoscopy on 6/15 for concern for aspiration in setting of sepsis w/ L opacity   Respiratory status required full ventilatory support, close monitoring of respiratory rate and breathing pattern, the following of ABG’s with A-line monitoring, continuous pulse oximetry monitoring  Plan to US L chest to evaluate for fluid collection     Mechanical Ventilation:  Mode: AC/ CMV (Assist Control/ Continuous Mandatory Ventilation)  RR (machine): 20  TV (machine): 500  FiO2: 40  PEEP: 5  ITime: 1  MAP: 13  PIP: 35      ====================CARDIOVASCULAR==================  Stage D NICM, s/p HM2 on 2017; recent driveline infection on 2021; HM2 settings: 8800rpm, flow 4.4   Admitted for anemia with supratherapeutic INR 8.84  Echo 6/15: severe global LV systolic dysfunction with HM2 in place, decreased RV systolic function, interventricular septums bows slightly to right, otherwise grossly similar to prior   Continue invasive hemodynamic monitoring   Rate control with Amiodarone   IV Epinephrine wean to off yesterday   On pressor support with IV Levophed and IV Vasopressin   S/p echo today, will f/u results     aMIOdarone    Tablet 200 milliGRAM(s) Oral daily  norepinephrine Infusion 0.05 MICROgram(s)/kG/Min (7.36 mL/Hr) IV Continuous <Continuous>  vasopressin Infusion 0.033 Unit(s)/Min (2 mL/Hr) IV Continuous <Continuous>    ===================HEMATOLOGIC/ONC ===================  Anemia in setting of melena and supratherapeutic INR of 8.84 on admission   Continue AC therapy with IV Heparin for afib, PTT goal 40-50   INR 1.30 now, will continue to monitor closely   Monitor H&H/Plts     heparin  Infusion 400 Unit(s)/Hr (6.5 mL/Hr) IV Continuous <Continuous>    ===================== RENAL =========================  Continue monitoring urine output, I&Os, Bun/Cr  Target even to slightly negative fluid balance  Monitor and replete lytes prn    ==================== GASTROINTESTINAL===================  Abdominal XR today: nonobstructive bowel gas pattern and large stool burden.  S/p capsule study notable for bleed in small bowel s/p balloon endoscopy on 6/15, notable for old blood in proximal ileum, no acute intervention indicated   Episodes of emesis early in the AM, now NPO, with intermittent tube feeds, Vital AF @ 55cc/hr    F/u with GI re: concern for ?perforation mentioned previously, ?CT?  Plan to start Reglan for gut motility     GI prophylaxis, pantoprazole  Injectable 40 milliGRAM(s) IV Push every 12 hours  sodium chloride 0.9% lock flush 3 milliLiter(s) IV Push every 8 hours  sodium chloride 0.9%. 1000 milliLiter(s) (10 mL/Hr) IV Continuous <Continuous>    =======================    ENDOCRINE  =====================  Continue glucose control with IV insulin drip for stress hyperglycemia    dextrose 50% Injectable 50 milliLiter(s) IV Push every 15 minutes  insulin regular Infusion 3 Unit(s)/Hr (3 mL/Hr) IV Continuous <Continuous>    ========================INFECTIOUS DISEASE================  Temp 101.7F, WBC downtrending 16.61->14.79   Concern for sepsis +rigors with temp up to 101.0F on 6/15, c/w Zosyn   BCx 6/15 with NGTD, SCx  NGTD     piperacillin/tazobactam IVPB.. 3.375 Gram(s) IV Intermittent every 8 hours      Patient requires continuous monitoring with bedside rhythm monitoring, pulse ox monitoring, and intermittent blood gas analysis. Care plan discussed with ICU care team. Patient remained critical and at risk for life threatening decompensation.    By signing my name below, I, Agnieszka Cherry, attest that this documentation has been prepared under the direction and in the presence of Jus Jensen MD   Electronically signed: Cesia Shaffer Matthew Pierce, personally performed the services described in this documentation. all medical record entries made by the scribe were at my direction and in my presence. I have reviewed the chart and agree that the record reflects my personal performance and is accurate and complete  Electronically signed: Jus Jensen MD 21 @ 06:16

## 2021-06-18 NOTE — PROGRESS NOTE ADULT - SUBJECTIVE AND OBJECTIVE BOX
INFECTIOUS DISEASES FOLLOW UP-- Anitra Prater  [phone]    This is a follow up note for this  66yMale with  Anemia,  s/p acute decompensation requiring intubation after upper endoscopy        ROS:  CONSTITUTIONAL: intubated, sedated    Allergies    No Known Allergies    Intolerances        ANTIBIOTICS/RELEVANT:  antimicrobials  piperacillin/tazobactam IVPB.. 3.375 Gram(s) IV Intermittent every 8 hours    immunologic:    OTHER:  acetaminophen    Suspension .. 650 milliGRAM(s) Oral every 6 hours PRN  aMIOdarone    Tablet 200 milliGRAM(s) Oral daily  chlorhexidine 0.12% Liquid 15 milliLiter(s) Oral Mucosa every 12 hours  chlorhexidine 2% Cloths 1 Application(s) Topical <User Schedule>  dexMEDEtomidine Infusion 0.5 MICROgram(s)/kG/Hr IV Continuous <Continuous>  dextrose 50% Injectable 50 milliLiter(s) IV Push every 15 minutes  EPINEPHrine    Infusion 0.01 MICROgram(s)/kG/Min IV Continuous <Continuous>  heparin  Infusion 400 Unit(s)/Hr IV Continuous <Continuous>  insulin regular Infusion 3 Unit(s)/Hr IV Continuous <Continuous>  metoclopramide Injectable 10 milliGRAM(s) IV Push every 8 hours  norepinephrine Infusion 0.05 MICROgram(s)/kG/Min IV Continuous <Continuous>  pantoprazole  Injectable 40 milliGRAM(s) IV Push every 12 hours  propofol Infusion 20 MICROgram(s)/kG/Min IV Continuous <Continuous>  sodium chloride 0.9% lock flush 3 milliLiter(s) IV Push every 8 hours  sodium chloride 0.9%. 1000 milliLiter(s) IV Continuous <Continuous>  vasopressin Infusion 0.033 Unit(s)/Min IV Continuous <Continuous>      Objective:  Vital Signs Last 24 Hrs  T(C): 37.2 (18 Jun 2021 16:00), Max: 38.7 (18 Jun 2021 04:00)  T(F): 99 (18 Jun 2021 16:00), Max: 101.7 (18 Jun 2021 04:00)  HR: 66 (18 Jun 2021 18:56) (56 - 75)  BP: --  BP(mean): --  RR: 33 (18 Jun 2021 17:00) (20 - 47)  SpO2: 98% (18 Jun 2021 18:56) (96% - 100%)    PHYSICAL EXAM:  Constitutional: intubated, sedated,   Eyes:ALONSO,  Ear/Nose/Throat: no oral lesions, thin secretions ET tube	  Respiratory: audible bilat but with crackles at bases  Cardiovascular: S1S2 LVAD sounds  Gastrointestinal:soft,   Extremities:no e/e/c  No Lymphadenopathy  IV sites not inflammed.    LABS:                        8.5    14.79 )-----------( 131      ( 18 Jun 2021 00:17 )             27.1     06-18    128<L>  |  98  |  17  ----------------------------<  144<H>  4.4   |  19<L>  |  0.89    Ca    9.0      18 Jun 2021 00:17  Phos  1.7     06-18  Mg     2.0     06-18    TPro  6.9  /  Alb  4.0  /  TBili  1.1  /  DBili  x   /  AST  31  /  ALT  18  /  AlkPhos  102  06-18    PT/INR - ( 17 Jun 2021 01:36 )   PT: 15.4 sec;   INR: 1.30 ratio         PTT - ( 18 Jun 2021 15:03 )  PTT:37.9 sec      MICROBIOLOGY:            RECENT CULTURES:  06-17 @ 17:18  Bronch Wash Bronchoalveolar Lavage  --  --  --    No growth to date.  --  06-16 @ 16:28  .Sputum Sputum  --  --  --  --  --  06-16 @ 08:20  .Sputum Sputum  --  --  --    No growth at 48 hours  --  06-15 @ 22:04  .Blood Blood-Peripheral  --  --  --    No growth to date.  --      RADIOLOGY & ADDITIONAL STUDIES:  < from: Xray Chest 1 View- PORTABLE-Routine (Xray Chest 1 View- PORTABLE-Routine in AM.) (06.18.21 @ 02:40) >  The heart is slightly enlarged. Pulmonary vascular congestion. Left costophrenic angle is not included in this study. Bibasilar pneumonia and/or atelectasis. Endotracheal tube is in good position. NG tube is in the stomach however its tip is not seen on the current study. A Cordis sheet catheter is seen on the right and the tip is in superior vena cava. Left ventricular assisted device is in place. A loop recorder is overlying the left lower hemithorax. No pneumothorax. Status post sternotomy.    < end of copied text >

## 2021-06-18 NOTE — PROGRESS NOTE ADULT - SUBJECTIVE AND OBJECTIVE BOX
Patient seen and examined at bedside.    Overnight Events: Pt remains intubated/sedated. Pt w/ mult bouts of emesis o/n. Some aspiration, emesis was suctioned from ETT after event.    Review Of Systems: No chest pain, shortness of breath, or palpitations            Current Meds:  acetaminophen    Suspension .. 650 milliGRAM(s) Oral every 6 hours PRN  aMIOdarone    Tablet 200 milliGRAM(s) Oral daily  chlorhexidine 0.12% Liquid 15 milliLiter(s) Oral Mucosa every 12 hours  chlorhexidine 2% Cloths 1 Application(s) Topical <User Schedule>  dexMEDEtomidine Infusion 0.5 MICROgram(s)/kG/Hr IV Continuous <Continuous>  dextrose 50% Injectable 50 milliLiter(s) IV Push every 15 minutes  EPINEPHrine    Infusion 0.01 MICROgram(s)/kG/Min IV Continuous <Continuous>  heparin  Infusion 400 Unit(s)/Hr IV Continuous <Continuous>  insulin regular Infusion 3 Unit(s)/Hr IV Continuous <Continuous>  metoclopramide Injectable 10 milliGRAM(s) IV Push every 8 hours  norepinephrine Infusion 0.05 MICROgram(s)/kG/Min IV Continuous <Continuous>  pantoprazole  Injectable 40 milliGRAM(s) IV Push every 12 hours  piperacillin/tazobactam IVPB.. 3.375 Gram(s) IV Intermittent every 8 hours  propofol Infusion 20 MICROgram(s)/kG/Min IV Continuous <Continuous>  sodium chloride 0.9% lock flush 3 milliLiter(s) IV Push every 8 hours  sodium chloride 0.9%. 1000 milliLiter(s) IV Continuous <Continuous>  vasopressin Infusion 0.033 Unit(s)/Min IV Continuous <Continuous>      Vitals:  T(F): 99 (06-18), Max: 101.7 (06-18)  HR: 63 (06-18) (56 - 75)  BP: --  RR: 20 (06-18)  SpO2: 100% (06-18)  I&O's Summary    17 Jun 2021 07:01  -  18 Jun 2021 07:00  --------------------------------------------------------  IN: 1954.4 mL / OUT: 3300 mL / NET: -1345.6 mL    18 Jun 2021 07:01  -  18 Jun 2021 19:17  --------------------------------------------------------  IN: 953.3 mL / OUT: 3500 mL / NET: -2546.7 mL        Physical Exam:  Gen: Critically ill pt.  HEENT: NCAT. ET in place.  Neck: No JVP elev.  CV: Normal S1, S2. RRR. No MRG.  Chest: CTAB. No WRR.  Abd: +BSx4. Soft. NTND.  Ext: No LE edema.  Skin: No cyanosis.                          8.5    14.79 )-----------( 131      ( 18 Jun 2021 00:17 )             27.1     06-18    128<L>  |  98  |  17  ----------------------------<  144<H>  4.4   |  19<L>  |  0.89    Ca    9.0      18 Jun 2021 00:17  Phos  1.7     06-18  Mg     2.0     06-18    TPro  6.9  /  Alb  4.0  /  TBili  1.1  /  DBili  x   /  AST  31  /  ALT  18  /  AlkPhos  102  06-18    PT/INR - ( 17 Jun 2021 01:36 )   PT: 15.4 sec;   INR: 1.30 ratio         PTT - ( 18 Jun 2021 15:03 )  PTT:37.9 sec   Patient seen and examined at bedside.    Overnight Events: Pt remains intubated/sedated. Pt w/ mult bouts of emesis o/n. Some aspiration, emesis was suctioned from ETT after event.    Review Of Systems: No chest pain, shortness of breath, or palpitations            Current Meds:  acetaminophen    Suspension .. 650 milliGRAM(s) Oral every 6 hours PRN  aMIOdarone    Tablet 200 milliGRAM(s) Oral daily  chlorhexidine 0.12% Liquid 15 milliLiter(s) Oral Mucosa every 12 hours  chlorhexidine 2% Cloths 1 Application(s) Topical <User Schedule>  dexMEDEtomidine Infusion 0.5 MICROgram(s)/kG/Hr IV Continuous <Continuous>  dextrose 50% Injectable 50 milliLiter(s) IV Push every 15 minutes  EPINEPHrine    Infusion 0.01 MICROgram(s)/kG/Min IV Continuous <Continuous>  heparin  Infusion 400 Unit(s)/Hr IV Continuous <Continuous>  insulin regular Infusion 3 Unit(s)/Hr IV Continuous <Continuous>  metoclopramide Injectable 10 milliGRAM(s) IV Push every 8 hours  norepinephrine Infusion 0.05 MICROgram(s)/kG/Min IV Continuous <Continuous>  pantoprazole  Injectable 40 milliGRAM(s) IV Push every 12 hours  piperacillin/tazobactam IVPB.. 3.375 Gram(s) IV Intermittent every 8 hours  propofol Infusion 20 MICROgram(s)/kG/Min IV Continuous <Continuous>  sodium chloride 0.9% lock flush 3 milliLiter(s) IV Push every 8 hours  sodium chloride 0.9%. 1000 milliLiter(s) IV Continuous <Continuous>  vasopressin Infusion 0.033 Unit(s)/Min IV Continuous <Continuous>      Vitals:  T(F): 99 (06-18), Max: 101.7 (06-18)  HR: 63 (06-18) (56 - 75)  BP: --  RR: 20 (06-18)  SpO2: 100% (06-18)  I&O's Summary    17 Jun 2021 07:01  -  18 Jun 2021 07:00  --------------------------------------------------------  IN: 1954.4 mL / OUT: 3300 mL / NET: -1345.6 mL    18 Jun 2021 07:01  -  18 Jun 2021 19:17  --------------------------------------------------------  IN: 953.3 mL / OUT: 3500 mL / NET: -2546.7 mL        Physical Exam:  Gen: Critically ill pt.  HEENT: NCAT. ET in place.  Neck: No JVP elev.  CV: Normal S1, S2. RRR. No MRG.  Chest: CTAB. No WRR.  Abd: +BSx4. Soft. NTND.  Ext: No LE edema.  Skin: No cyanosis.    LVAD interrogation   Flow: 4  Speed: 8800  PI: 5  Power: 4.6   Alarms: 1 low flow alarm yesterday at 12 PM  Changes to device programming: none                           8.5    14.79 )-----------( 131      ( 18 Jun 2021 00:17 )             27.1     06-18    128<L>  |  98  |  17  ----------------------------<  144<H>  4.4   |  19<L>  |  0.89    Ca    9.0      18 Jun 2021 00:17  Phos  1.7     06-18  Mg     2.0     06-18    TPro  6.9  /  Alb  4.0  /  TBili  1.1  /  DBili  x   /  AST  31  /  ALT  18  /  AlkPhos  102  06-18    PT/INR - ( 17 Jun 2021 01:36 )   PT: 15.4 sec;   INR: 1.30 ratio         PTT - ( 18 Jun 2021 15:03 )  PTT:37.9 sec

## 2021-06-18 NOTE — PROGRESS NOTE ADULT - ASSESSMENT
64 YO M with a history of stage D NICM s/p HM2 on 9/2017 as DT (due to severe PAD) with TV ring, prior COVID-19 infection 4/2020, recurrent syncope post LVAD s/p ILR, and chronic abdominal pain with prior negative workup who was admitted with symptomatic anemia with Hgb 4.5 in setting of INR 8.8 without hemodynamic instability. He was transfused and underwent VCE which showed active bleeding in the mid small bowel but subsequent enteroscopy 6/15 did not reveal any active bleeding. He acutely decompensated after procedure with fever/hypertension, low flow alarms, and pulmonary infiltrate with hypoxia requiring intubation from probable aspiration though bowel perforation and TRALI also on the differential. Reassuringly his cultures have been negative. His LDH is normal and there are no signs of overt LVAD dysfunction on echo. Will follow up infectious workup and wean ventilator as tolerates.

## 2021-06-18 NOTE — PROGRESS NOTE ADULT - ASSESSMENT
Impression:  # Anemia - patient reports black stool however formed and hard not consistent with bleed; minimal dark brown stool on exam; hgb low compared to baseline (baseline 7-8, presented w/ 4.5). On previous admissions (1/2021) Hematology reviewed smear consistent with LVAD changes. Now s/p + capsul;e but negative single balloon; Hg now stable  # hypoxia - likely from aspiration PNA during procedure, s/p bronch  # ileus - bilious output from NGT and vomiting c/f ileus, however CXR w/ nonobstructive gas pattern  # LVAD on AC - supratherapeutic INR    Recommendation:  - trend CBCq12 hours and transfuse for goal >8  - supportive care per primary  - active Type and screen  - ok to resume anticoagulation; avoid supratherapeutic anticoagulation  - rest of care per primary team    GI will continue to follow.     Pal Ott, PGY4  Gastroenterology Fellow  Available on Microsoft Teams  [phone] ([employer] Short Range Pager)  [phone] (Long Range Pager)    After 5pm, please contact the on-call GI fellow. [phone]

## 2021-06-18 NOTE — PROGRESS NOTE ADULT - ATTENDING COMMENTS
CXR and ventilator requirements improving. Continue gentle diuresis and will extubate when optimized.

## 2021-06-18 NOTE — PROGRESS NOTE ADULT - PROBLEM SELECTOR PLAN 2
- respiratory and blood cultures NGTD  - check procalcitonin   - BAL w/ hyperemic airways, but no hemorrhage  - ID following, continue empiric zosyn for now  - wean pressors as tolerates, may improve as sedation comes off  - Pt w/ bouts of emesis o/n, f/u GI concern for acute GI pathology

## 2021-06-18 NOTE — PROGRESS NOTE ADULT - ASSESSMENT
65M PMH ACC/AHA stage D HF due to NICM HM2 LVAD, TV annuloplasty ring 9/12/17 as destination therapy due to severe peripheral artery disease with significant stenosis SIADH, Depression, CKD-3 with hyperkalemia, past E. coli UTIs, driveline drainage (1/7/21) and COVID-19 (back in April 2020)  He was recently seen in clinic where he complained of abdominal pain and dark stools w constipation back in May. He presents to [facility] ER today weakness and fatigue, moderate and + Black stools for three days, on coumadin secondary to warfarin use in the setting of an LVAD.  Patient has required transfusions for GIB in the past. Mostly recently back in jan 2021 pt had anemia with dark stools. No interventions was done at that time. However Last Endoscopy was done in July 2020 (negative). Today labs show patient is anemic with H/H of 4.5/16.3,. INR is 8.84 MAP in the 90s, Returned from endoscopy appearing ill tachypneic with chills with new white out of his left lung    #Sepsis:  - Aspiration event vs esophageal tear at time of procedure vs more likely transfusion related reaction as BAL x2 is not revealing a pthogen  - await final sputum /BAL results  - c/w Zosyn 3.375g q8h (6/16-) will plan a short course if cultures remain negative  - Follow up BAL (6/17) is no growth to date    #Leukocytosis and remains febrile:  - From sepsis, fluctuating, stable  - Trend CBC, VS  - repeat blood cultures, change lines per protocol    # weaning trials  as tolerated    Morris Prater MD  [phone]  After 5pm/weekends [phone]

## 2021-06-18 NOTE — PROGRESS NOTE ADULT - SUBJECTIVE AND OBJECTIVE BOX
GI Progress Note      Interval Events:   - yesterday underwent bronch, several hundred cc's of food and blood removed  - NGT w/ bilious output overnight, 1 episode of vomiting  - no bowel movement  - no further episodes of bleeding, Hg stable; remains intubated    Allergies:  No Known Allergies      Hospital Medications:  acetaminophen    Suspension .. 650 milliGRAM(s) Oral every 6 hours PRN  aMIOdarone    Tablet 200 milliGRAM(s) Oral daily  chlorhexidine 0.12% Liquid 15 milliLiter(s) Oral Mucosa every 12 hours  chlorhexidine 2% Cloths 1 Application(s) Topical <User Schedule>  dexMEDEtomidine Infusion 0.5 MICROgram(s)/kG/Hr IV Continuous <Continuous>  dextrose 50% Injectable 50 milliLiter(s) IV Push every 15 minutes  EPINEPHrine    Infusion 0.01 MICROgram(s)/kG/Min IV Continuous <Continuous>  heparin  Infusion 400 Unit(s)/Hr IV Continuous <Continuous>  insulin regular Infusion 3 Unit(s)/Hr IV Continuous <Continuous>  metoclopramide Injectable 10 milliGRAM(s) IV Push every 8 hours  norepinephrine Infusion 0.05 MICROgram(s)/kG/Min IV Continuous <Continuous>  pantoprazole  Injectable 40 milliGRAM(s) IV Push every 12 hours  piperacillin/tazobactam IVPB.. 3.375 Gram(s) IV Intermittent every 8 hours  propofol Infusion 20 MICROgram(s)/kG/Min IV Continuous <Continuous>  sodium chloride 0.9% lock flush 3 milliLiter(s) IV Push every 8 hours  sodium chloride 0.9%. 1000 milliLiter(s) IV Continuous <Continuous>  vasopressin Infusion 0.033 Unit(s)/Min IV Continuous <Continuous>        PHYSICAL EXAM:   Vital Signs:  Vital Signs Last 24 Hrs  T(C): 37.6 (2021 07:00), Max: 38.7 (2021 04:00)  T(F): 99.7 (2021 07:00), Max: 101.7 (2021 04:00)  HR: 59 (2021 10:34) (56 - 76)  BP: --  BP(mean): --  RR: 20 (2021 10:00) (20 - 47)  SpO2: 100% (2021 10:34) (94% - 100%)  Daily     Daily Weight in k.8 (2021 00:00)    GENERAL:  intubated, sedated  HEENT:  NCAT, no scleral icterus  CHEST: intubated  HEART:  RRR  ABDOMEN:  Soft, non-tender, non-distended, normoactive bowel sounds, no masses, no hepato-splenomegaly, no signs of chronic liver disease  EXTREMITIES:  No cyanosis, clubbing, or edema  SKIN:  No rash/erythema/ecchymoses/petechiae/wounds/abscess/warm/dry  NEURO:  sedated    LABS:                        8.5    14.79 )-----------( 131      ( 2021 00:17 )             27.1     Mean Cell Volume: 84.4 fl (- @ 00:17)    06-18    128<L>  |  98  |  17  ----------------------------<  144<H>  4.4   |  19<L>  |  0.89    Ca    9.0      2021 00:17  Phos  1.7     06-18  Mg     2.0     06-18    TPro  6.9  /  Alb  4.0  /  TBili  1.1  /  DBili  x   /  AST  31  /  ALT  18  /  AlkPhos  102  06-18    LIVER FUNCTIONS - ( 2021 00:17 )  Alb: 4.0 g/dL / Pro: 6.9 g/dL / ALK PHOS: 102 U/L / ALT: 18 U/L / AST: 31 U/L / GGT: x           PT/INR - ( 2021 01:36 )   PT: 15.4 sec;   INR: 1.30 ratio         PTT - ( 2021 06:34 )  PTT:35.9 sec          Imaging:

## 2021-06-19 NOTE — PROGRESS NOTE ADULT - SUBJECTIVE AND OBJECTIVE BOX
RICKY HAMPTON  MRN-[id number]  Patient is a 66y old  Male who presents with a chief complaint of Anemia, Supratherapeutic INR, Dark Stools (2021 15:45)    HPI:  65M PMH ACC/AHA stage D HF due to NICM HM2 LVAD , TV annuloplasty ring 17 as destination therapy due to severe peripheral artery disease with significant stenosis  SIADH, Depression, CKD-3 with hyperkalemia, past E. coli UTIs, driveline drainage (21) and COVID-19 (back in 2020)  He was recently seen in clinic where he complained of abdominal pain and dark stools w constipation back in May. He presents to [facility] ER today weakness and fatigue, moderate and + Black stools for three days, on coumadin secondary to warfarin use in the setting of an LVAD. Patient has required transfusions for GIB in the past. Mostly recently back in 2021 pt had anemia with dark stools. No interventions was done at that time. However Last Endoscopy was done in 2020 (negative). Today labs show patient is anemic with H/H of 4.5/16.3,. INR is 8.84 MAP in the 90s, Temp 35.1. He denies any chest pain, shortness of breath, dizziness, abd pain, nausea or vomiting.       (2021 16:57)      Surgery/Hospital course:   admit for melena w/ anemia, INR 8.84   6/15 Capsul study (+) for small bowel bleed, balloon endoscopy (old blood in prox ileum); post EGD - septic w/ L opacity, re-intubated for concern for aspiration, TTE (Mod MR, decrease biV w/ interventricular septum boweing towards R)   bronch    Weaning sedation     Today/Overnight:  Weaning sedation in order to allow for CPAP trials, fever of 101.5F, and CPAP for 5 hrs but was experiencing tachypnea so he was placed back on the vent.     Vital Signs Last 24 Hrs  T(C): 38.2 (2021 16:00), Max: 38.6 (2021 22:00)  T(F): 100.8 (2021 16:00), Max: 101.5 (2021 22:00)  HR: 64 (2021 19:00) (59 - 75)  BP: --  BP(mean): --  RR: 21 (2021 19:00) (19 - 35)  SpO2: 97% (2021 19:00) (97% - 100%)  ============================I/O===========================  I&O's Detail    2021 07:01  -  2021 07:00  --------------------------------------------------------  IN:    Dexmedetomidine: 470.4 mL    Heparin: 181 mL    Insulin: 14 mL    IV PiggyBack: 612.5 mL    IV PiggyBack: 825 mL    Propofol: 214.7 mL    sodium chloride 0.9%: 240 mL  Total IN: 2557.6 mL    OUT:    Emesis (mL): 300 mL    EPINEPHrine: 0 mL    Norepinephrine: 0 mL    Vasopressin: 0 mL    Voided (mL): 4020 mL  Total OUT: 4320 mL    Total NET: -1762.4 mL      2021 07:01  -  2021 19:26  --------------------------------------------------------  IN:    Dexmedetomidine: 278.8 mL    Enteral Tube Flush: 50 mL    Heparin: 123 mL    IV PiggyBack: 175 mL    Miscellaneous Tube Feedin mL    Propofol: 104.1 mL    sodium chloride 0.9%: 90 mL  Total IN: 1030.9 mL    OUT:    Emesis (mL): 0 mL    Insulin: 0 mL    Vasopressin: 0 mL    Voided (mL): 805 mL  Total OUT: 805 mL    Total NET: 225.9 mL        ============================ LABS =========================                        8.5    12.66 )-----------( 142      ( 2021 00:33 )             26.9         139  |  105  |  13  ----------------------------<  124<H>  4.1   |  18<L>  |  0.80    Ca    9.1      2021 00:33  Phos  1.8       Mg     2.1         TPro  7.0  /  Alb  3.6  /  TBili  0.9  /  DBili  x   /  AST  22  /  ALT  15  /  AlkPhos  123<H>      LIVER FUNCTIONS - ( 2021 00:33 )  Alb: 3.6 g/dL / Pro: 7.0 g/dL / ALK PHOS: 123 U/L / ALT: 15 U/L / AST: 22 U/L / GGT: x           PT/INR - ( 2021 00:33 )   PT: 14.8 sec;   INR: 1.25 ratio         PTT - ( 2021 15:50 )  PTT:38.9 sec  ABG - ( 2021 00:29 )  pH, Arterial: 7.46  pH, Blood: x     /  pCO2: 32    /  pO2: 92    / HCO3: 23    / Base Excess: -.2   /  SaO2: 98                Urinalysis Basic - ( 2021 22:06 )    Color: Yellow / Appearance: Clear / S.027 / pH: x  Gluc: x / Ketone: Moderate  / Bili: Negative / Urobili: 2 mg/dL   Blood: x / Protein: 30 mg/dL / Nitrite: Negative   Leuk Esterase: Moderate / RBC: 140 /hpf / WBC 6 /HPF   Sq Epi: x / Non Sq Epi: 2 /hpf / Bacteria: Negative      ======================Micro/Rad/Cardio=================  Culture: Reviewed   CXR: Reviewed  Echo: Reviewed  ======================================================  PAST MEDICAL & SURGICAL HISTORY:  CHF (congestive heart failure)    CAD (coronary artery disease)    Depression    Pleural effusion    History of 2019 novel coronavirus disease (COVID-19)  2020    Hemorrhoids    Bleeding hemorrhoids    Peripheral arterial disease    Claudication    BPH with urinary obstruction    ACC/AHA stage D systolic heart failure    Anticoagulation goal of INR 2.0 to 2.5    Falls    Clavicle fracture    CKD (chronic kidney disease), stage III    Iron deficiency anemia    H/O epistaxis    Vertigo    GI bleed    S/P TVR (tricuspid valve repair)    S/P ventricular assist device    S/P endoscopy      ========================ASSESSMENT ================  Stage D Nonischemic Cardiomyopathy, Status Post HM2 on 2017   Recent driveline infection on 2021   Acute hypoxemic respiratory failure  aspiration pneumonia  septic shock  cardiogenic shock  GI bleed , Status Post Enteroscopy   Anemia, in setting of melena   Chronic Kidney Disease  Thrombocytopenia   Leukocytosis  coagulopathy   Stress hyperglycemia   Hemodynamic instability   Fevers    Plan:  ====================== NEUROLOGY=====================  Sedated with IV Precedex and IV Propofol drips for ventilator synchrony.   Assess neuro status per protocol when pt is off sedation.   Continue with Tylenol for pain management     acetaminophen    Suspension .. 650 milliGRAM(s) Oral every 6 hours PRN Temp greater or equal to 38C (100.4F)  dexMEDEtomidine Infusion 0.5 MICROgram(s)/kG/Hr (9.81 mL/Hr) IV Continuous <Continuous>  propofol Infusion 20 MICROgram(s)/kG/Min (9.42 mL/Hr) IV Continuous <Continuous>    ==================== RESPIRATORY======================  CPAP today for 5 hrs, 2 hrs 15/5 and 3 hrs 10/5. After a few hours pt started to experienced tachypnea and was placed on full vent support, requiring close monitoring of respiratory rate, breathing pattern, pulse oximetry monitoring, and intermittent blood gas analysis.     Mechanical Ventilation:  Mode: AC/ CMV (Assist Control/ Continuous Mandatory Ventilation)  RR (machine): 20  TV (machine): 500  FiO2: 40  PEEP: 5  ITime: 1  MAP: 11  PIP: 25      ====================CARDIOVASCULAR==================  Stage D Nonischemic cardiomyopathy, s/p hm2 on 2017; recent driveline infection on 2021; HM2 settings: 8800rpm, flow 4.4, now increased to 9000 rpm due to MR and to help with volume overload after transfusion and volume resuscitation.   Echo 6/15: severe global Left venticular systolic dysfunction with HM2 in place, decreased Right ventricular systolic function, interventricular septums bows slightly to right, otherwise grossly similar to prior.   Rate and rhythm control with Amiodarone.   Invasive hemodynamic monitoring, assess perfusion indices.     aMIOdarone    Tablet 200 milliGRAM(s) Oral daily    ===================HEMATOLOGIC/ONC ===================  Acute Blood Loss Anemia   H/H: 8.5/26.9%  Thromobocytopenia, PLTs:142  IV Heparin resumed for LVAD with lower PTT goal of around 50 in light of recent bleed. IV heparin will also cover for venous thromboembolism prophylaxis.    heparin  Infusion 400 Unit(s)/Hr (12 mL/Hr) IV Continuous <Continuous>    ===================== RENAL =========================  Continue to monitor I/Os, BUN/Creatinine, and urine output.   Goal net negative fluid balance. Replete lytes PRN. Keep K> 4 and Mg >2.     ==================== GASTROINTESTINAL===================  Patient had a notable bleed in small bowel on capsule study.   Patient underwent endoscopy on 6/15, notable for old blood in proximal ileum, no acute intervention indicated.   Tolerating  Vital AF to goal @ 55cc/hr.   Continue Protonix for stress ulcer prophylaxis.   Reglan for gut motility.     pantoprazole  Injectable 40 milliGRAM(s) IV Push every 12 hours  sodium chloride 0.9% lock flush 3 milliLiter(s) IV Push every 8 hours  sodium chloride 0.9%. 1000 milliLiter(s) (10 mL/Hr) IV Continuous <Continuous>  metoclopramide Injectable 10 milliGRAM(s) IV Push every 8 hours  =======================    ENDOCRINE  =====================  Metabolic stability, stress hyperglycemia required a lispro Insulin sliding scale while following serial glucose levels to help achieve and maintain euglycemia.      dextrose 50% Injectable 50 milliLiter(s) IV Push every 15 minutes  insulin lispro (ADMELOG) corrective regimen sliding scale   SubCutaneous every 6 hours    ========================INFECTIOUS DISEASE================  Current temperature 99.3->101.5F with Leukocytosis, white blood count down trending 14.79 -> 12.6. Continue to monitor temperature and white blood count. Concern for sepsis +rigors with temp up to 101.5F today. Continue empiric coverage with Zosyn for possible aspiration pneumonia. Chest xray is clearing. Vancomycin discontinued as discussed with ID.    piperacillin/tazobactam IVPB.. 3.375 Gram(s) IV Intermittent every 8 hours      Patient requires continuous monitoring with bedside rhythm monitoring, pulse oximetry monitoring, and continuous central venous and arterial pressure monitoring; and intermittent blood gas analysis.  Care plan discussed with ICU care team.    Patient remained critical, at risk for life threatening decompensation.   I have spent 35 minutes providing acute care with multiple re-evaluations throughout the evening.     By signing my name below, IDaniela, attest that this documentation has been prepared under the direction and in the presence of CLAUDIA Mejia.  Electronically signed: Cesia Suresh, 21 @ 19:26    LIZETH, CLAUDIA Mejia, personally performed the services described in this documentation. All medical record entries made by the scribe were at my direction and in my presence. I have reviewed the chart and agree that the record reflects my personal performance and is accurate and complete  Electronically signed: CLAUDIA Mejia, 21 @ 19:26       RICKY HAMPTON  MRN-[id number]  Patient is a 66y old  Male who presents with a chief complaint of Anemia, Supratherapeutic INR, Dark Stools (2021 15:45)    HPI:  65M PMH ACC/AHA stage D HF due to NICM HM2 LVAD , TV annuloplasty ring 17 as destination therapy due to severe peripheral artery disease with significant stenosis  SIADH, Depression, CKD-3 with hyperkalemia, past E. coli UTIs, driveline drainage (21) and COVID-19 (back in 2020)  He was recently seen in clinic where he complained of abdominal pain and dark stools w constipation back in May. He presents to [facility] ER today weakness and fatigue, moderate and + Black stools for three days, on coumadin secondary to warfarin use in the setting of an LVAD. Patient has required transfusions for GIB in the past. Mostly recently back in 2021 pt had anemia with dark stools. No interventions was done at that time. However Last Endoscopy was done in 2020 (negative). Today labs show patient is anemic with H/H of 4.5/16.3,. INR is 8.84 MAP in the 90s, Temp 35.1. He denies any chest pain, shortness of breath, dizziness, abd pain, nausea or vomiting.       (2021 16:57)    Surgery/Hospital course:   admit for melena w/ anemia, INR 8.84   6/15 Capsul study (+) for small bowel bleed, balloon endoscopy (old blood in prox ileum); post EGD - septic w/ L opacity, re-intubated for concern for aspiration, TTE (Mod MR, decrease biV w/ interventricular septum boweing towards R)   bronch    Weaning sedation     Today/Overnight:  Weaning sedation in order to allow for CPAP trials, fever of 101.5F, and CPAP for 5 hrs but was experiencing tachypnea so he was placed back on the vent.     Vital Signs Last 24 Hrs  T(C): 38.2 (2021 16:00), Max: 38.6 (2021 22:00)  T(F): 100.8 (2021 16:00), Max: 101.5 (2021 22:00)  HR: 64 (2021 19:00) (59 - 75)  BP(mean): 68  RR: 21 (2021 19:00) (19 - 35)  SpO2: 97% (2021 19:00) (97% - 100%)  ============================I/O===========================  I&O's Detail    2021 07:01  -  2021 07:00  --------------------------------------------------------  IN:    Dexmedetomidine: 470.4 mL    Heparin: 181 mL    Insulin: 14 mL    IV PiggyBack: 612.5 mL    IV PiggyBack: 825 mL    Propofol: 214.7 mL    sodium chloride 0.9%: 240 mL  Total IN: 2557.6 mL    OUT:    Emesis (mL): 300 mL    EPINEPHrine: 0 mL    Norepinephrine: 0 mL    Vasopressin: 0 mL    Voided (mL): 4020 mL  Total OUT: 4320 mL    Total NET: -1762.4 mL    2021 07:01  -  2021 19:26  --------------------------------------------------------  IN:    Dexmedetomidine: 278.8 mL    Enteral Tube Flush: 50 mL    Heparin: 123 mL    IV PiggyBack: 175 mL    Miscellaneous Tube Feedin mL    Propofol: 104.1 mL    sodium chloride 0.9%: 90 mL  Total IN: 1030.9 mL    OUT:    Emesis (mL): 0 mL    Insulin: 0 mL    Vasopressin: 0 mL    Voided (mL): 805 mL  Total OUT: 805 mL    Total NET: 225.9 mL    ============================ LABS =========================                        8.5    12.66 )-----------( 142      ( 2021 00:33 )             26.9     139  |  105  |  13  ----------------------------<  124<H>  4.1   |  18<L>  |  0.80    Ca    9.1      2021 00:33  Phos  1.8       Mg     2.1         TPro  7.0  /  Alb  3.6  /  TBili  0.9  /  DBili  x   /  AST  22  /  ALT  15  /  AlkPhos  123<H>      LIVER FUNCTIONS - ( 2021 00:33 )  Alb: 3.6 g/dL / Pro: 7.0 g/dL / ALK PHOS: 123 U/L / ALT: 15 U/L / AST: 22 U/L / GGT: x           PT/INR - ( 2021 00:33 )   PT: 14.8 sec;   INR: 1.25 ratio      PTT - ( 2021 15:50 )  PTT:38.9 sec  ABG - ( 2021 00:29 )  pH, Arterial: 7.46  pH, Blood: x     /  pCO2: 32    /  pO2: 92    / HCO3: 23    / Base Excess: -.2   /  SaO2: 98        Urinalysis Basic - ( 2021 22:06 )    Color: Yellow / Appearance: Clear / S.027 / pH: x  Gluc: x / Ketone: Moderate  / Bili: Negative / Urobili: 2 mg/dL   Blood: x / Protein: 30 mg/dL / Nitrite: Negative   Leuk Esterase: Moderate / RBC: 140 /hpf / WBC 6 /HPF   Sq Epi: x / Non Sq Epi: 2 /hpf / Bacteria: Negative    ======================Micro/Rad/Cardio=================  Culture: Reviewed   CXR: Reviewed  Echo: Reviewed    ======================================================  PAST MEDICAL & SURGICAL HISTORY:  CHF (congestive heart failure)    CAD (coronary artery disease)    Depression    Pleural effusion    History of 2019 novel coronavirus disease (COVID-19)  2020    Hemorrhoids    Bleeding hemorrhoids    Peripheral arterial disease    Claudication    BPH with urinary obstruction    ACC/AHA stage D systolic heart failure    Anticoagulation goal of INR 2.0 to 2.5    Falls    Clavicle fracture    CKD (chronic kidney disease), stage III    Iron deficiency anemia    H/O epistaxis    Vertigo    GI bleed    S/P TVR (tricuspid valve repair)    S/P ventricular assist device    S/P endoscopy    ========================ASSESSMENT ================  Stage D Nonischemic Cardiomyopathy, Status Post HM2 on 2017   Recent driveline infection on 2021   Acute hypoxemic respiratory failure  aspiration pneumonia  septic shock  cardiogenic shock  GI bleed , Status Post Enteroscopy   Anemia, in setting of melena   Chronic Kidney Disease  Thrombocytopenia   Leukocytosis  coagulopathy   Stress hyperglycemia   Hemodynamic instability   Fevers    Plan:  ====================== NEUROLOGY=====================  Sedated with IV Precedex and IV Propofol drips for ventilator synchrony.   Assess neuro status per protocol when pt is off sedation.   Continue with Tylenol for pain management     acetaminophen    Suspension .. 650 milliGRAM(s) Oral every 6 hours PRN Temp greater or equal to 38C (100.4F)  dexMEDEtomidine Infusion 0.5 MICROgram(s)/kG/Hr (9.81 mL/Hr) IV Continuous <Continuous>  propofol Infusion 20 MICROgram(s)/kG/Min (9.42 mL/Hr) IV Continuous <Continuous>    ==================== RESPIRATORY======================  CPAP today for 5 hrs, 2 hrs 15/5 and 3 hrs 10/5. After a few hours pt started to experienced tachypnea and was placed on full vent support, requiring close monitoring of respiratory rate, breathing pattern, pulse oximetry monitoring, and intermittent blood gas analysis.     Mechanical Ventilation:  Mode: AC/ CMV (Assist Control/ Continuous Mandatory Ventilation)  RR (machine): 20  TV (machine): 500  FiO2: 40  PEEP: 5  ITime: 1  MAP: 11  PIP: 25    ====================CARDIOVASCULAR==================  Stage D Nonischemic cardiomyopathy, s/p hm2 on 2017; recent driveline infection on 2021; HM2 settings: 8800rpm, flow 4.4, now increased to 9000 rpm due to MR and to help with volume overload after transfusion and volume resuscitation.   Echo 6/15: severe global Left venticular systolic dysfunction with HM2 in place, decreased Right ventricular systolic function, interventricular septums bows slightly to right, otherwise grossly similar to prior.   Rate and rhythm control with Amiodarone.   Invasive hemodynamic monitoring, assess perfusion indices.     aMIOdarone    Tablet 200 milliGRAM(s) Oral daily    ===================HEMATOLOGIC/ONC ===================  Acute Blood Loss Anemia   H/H: 8.5/26.9%  Thromobocytopenia, PLTs:142  IV Heparin resumed for LVAD with lower PTT goal of 40-50 in setting of recent GI bleed.     heparin  Infusion 400 Unit(s)/Hr (12 mL/Hr) IV Continuous <Continuous>    ===================== RENAL =========================  Continue to monitor I/Os, BUN/Creatinine, and urine output.   Goal net negative fluid balance. Replete lytes PRN. Keep K> 4 and Mg >2.     ==================== GASTROINTESTINAL===================  Patient had a notable bleed in small bowel on capsule study.   Patient underwent endoscopy on 6/15, notable for old blood in proximal ileum, no acute intervention indicated.   Tolerating  Vital AF to goal @ 55cc/hr.   Continue Protonix for stress ulcer prophylaxis.   Reglan for gut motility.     pantoprazole  Injectable 40 milliGRAM(s) IV Push every 12 hours  sodium chloride 0.9% lock flush 3 milliLiter(s) IV Push every 8 hours  sodium chloride 0.9%. 1000 milliLiter(s) (10 mL/Hr) IV Continuous <Continuous>  metoclopramide Injectable 10 milliGRAM(s) IV Push every 8 hours  =======================    ENDOCRINE  =====================  Metabolic stability, stress hyperglycemia required a lispro Insulin sliding scale while following serial glucose levels to help achieve and maintain euglycemia.      dextrose 50% Injectable 50 milliLiter(s) IV Push every 15 minutes  insulin lispro (ADMELOG) corrective regimen sliding scale   SubCutaneous every 6 hours    ========================INFECTIOUS DISEASE================  Current temperature 99.3->101.5F with Leukocytosis, white blood count down trending 14.79 -> 12.6. Continue to monitor temperature and white blood count. Concern for sepsis +rigors with temp up to 101.5F today. Continue empiric coverage with Zosyn for possible aspiration pneumonia. Chest xray is clearing. Vancomycin discontinued as discussed with ID.    piperacillin/tazobactam IVPB.. 3.375 Gram(s) IV Intermittent every 8 hours    Patient requires continuous monitoring with bedside rhythm monitoring, pulse oximetry monitoring, and continuous central venous and arterial pressure monitoring; and intermittent blood gas analysis.  Care plan discussed with ICU care team.    Patient remained critical, at risk for life threatening decompensation.   I have spent 35 minutes providing acute care with multiple re-evaluations throughout the evening.     By signing my name below, I, Daniela Chowdary, attest that this documentation has been prepared under the direction and in the presence of CLAUDIA Mejia.  Electronically signed: Cesia Suresh, 21 @ 19:26    I, CLAUDIA Mejia, personally performed the services described in this documentation. All medical record entries made by the scribe were at my direction and in my presence. I have reviewed the chart and agree that the record reflects my personal performance and is accurate and complete  Electronically signed: CLAUDIA Mejia, 21 @ 19:26

## 2021-06-19 NOTE — PROGRESS NOTE ADULT - SUBJECTIVE AND OBJECTIVE BOX
INFECTIOUS DISEASES FOLLOW UP--Christiano Romeo MD  Pager [phone]    This is a follow up note for this  66y Male with  Anemia  possible post Endo aspiration.  now on vanco and zosyn.  No + cx    Further ROS:  unable--intubated/sedated    Allergies  No Known Allergies    ANTIBIOTICS/RELEVANT:  antimicrobials  piperacillin/tazobactam IVPB.. 3.375 Gram(s) IV Intermittent every 8 hours  vancomycin  IVPB      vancomycin  IVPB 1000 milliGRAM(s) IV Intermittent every 12 hours    OTHER:  acetaminophen    Suspension .. 650 milliGRAM(s) Oral every 6 hours PRN  aMIOdarone    Tablet 200 milliGRAM(s) Oral daily  chlorhexidine 0.12% Liquid 15 milliLiter(s) Oral Mucosa every 12 hours  chlorhexidine 2% Cloths 1 Application(s) Topical <User Schedule>  dexMEDEtomidine Infusion 0.5 MICROgram(s)/kG/Hr IV Continuous <Continuous>  dextrose 50% Injectable 50 milliLiter(s) IV Push every 15 minutes  heparin  Infusion 400 Unit(s)/Hr IV Continuous <Continuous>  insulin lispro (ADMELOG) corrective regimen sliding scale   SubCutaneous every 6 hours  insulin regular Infusion 3 Unit(s)/Hr IV Continuous <Continuous>  metoclopramide Injectable 10 milliGRAM(s) IV Push every 8 hours  pantoprazole  Injectable 40 milliGRAM(s) IV Push every 12 hours  propofol Infusion 20 MICROgram(s)/kG/Min IV Continuous <Continuous>  sodium chloride 0.9% lock flush 3 milliLiter(s) IV Push every 8 hours  sodium chloride 0.9%. 1000 milliLiter(s) IV Continuous <Continuous>    Objective:  Vital Signs Last 24 Hrs  T(C): 37.4 (2021 08:00), Max: 38.6 (2021 22:00)  T(F): 99.3 (2021 08:00), Max: 101.5 (2021 22:00)  HR: 59 (2021 09:00) (56 - 75)  BP: --  BP(mean): --  RR: 20 (2021 09:00) (20 - 35)  SpO2: 99% (2021 09:00) (98% - 100%)    PHYSICAL EXAM:  intubated and sedated  arousable  Respiratory: bs ant BL  Cardiovascular: S1S2  Gastrointestinal:soft, (+) BS, no distension  + aleman  exit site abd no cellulitis  Extremities:no e/e/c  No Lymphadenopathy  IV sites not inflammed.    LABS:                        8.5    12.66 )-----------( 142      ( 2021 00:33 )             26.9         139  |  105  |  13  ----------------------------<  124<H>  4.1   |  18<L>  |  0.80    Ca    9.1      2021 00:33  Phos  1.8       Mg     2.1         TPro  7.0  /  Alb  3.6  /  TBili  0.9  /  DBili  x   /  AST  22  /  ALT  15  /  AlkPhos  123<H>  19    PT/INR - ( 2021 00:33 )   PT: 14.8 sec;   INR: 1.25 ratio         PTT - ( 2021 04:39 )  PTT:37.6 sec  Urinalysis Basic - ( 2021 22:06 )    Color: Yellow / Appearance: Clear / S.027 / pH: x  Gluc: x / Ketone: Moderate  / Bili: Negative / Urobili: 2 mg/dL   Blood: x / Protein: 30 mg/dL / Nitrite: Negative   Leuk Esterase: Moderate / RBC: 140 /hpf / WBC 6 /HPF   Sq Epi: x / Non Sq Epi: 2 /hpf / Bacteria: Negative    MICROBIOLOGY: all cx neg to date--no mrsa    RADIOLOGY & ADDITIONAL STUDIES:  < from: Xray Chest 1 View- PORTABLE-Routine (Xray Chest 1 View- PORTABLE-Routine in AM.) (21 @ 02:40) >    The heart is slightly enlarged. Pulmonary vascular congestion. Left costophrenic angle is not included in this study. Bibasilar pneumonia and/or atelectasis. Endotracheal tube is in good position. NG tube is in the stomach however its tip is not seen on the current study. A Cordis sheet catheter is seen on the right and the tip is in superior vena cava. Left ventricular assisted device is in place. A loop recorder is overlying the left lower hemithorax. No pneumothorax. Status post sternotomy.    < end of copied text >

## 2021-06-19 NOTE — CONSULT NOTE ADULT - ASSESSMENT
Assessment and recommendation :  Recurrent Acute hypoxic respiratory Failure intubated on full ventilatory support   Acute left lower lobe pneumonia  possible Aspiration pneumonia   continue IV Antibiotics with Zosyn   Non ischemic cardiomyopathy continue ACE inhibitor and B-Blockers   Septic shock and cardiogenic shock   Stage D systolic heart failure S/P LVAD HM2   MH2 LVAD  with  TV Annuloplasty  Severe peripheral vascular disease   Anion gap metabolic acidosis  severe hyperglycemia on insulin coverage    On  Amiodarone and IV heparin   Anemia of Acute blood Loss   S/P Small bowel bleeding   S/P blood and FFP transfusion   Chronic kidney disease stage III  Continue NGT feeding   GI prophylaxis  critical care time 120 minutes

## 2021-06-19 NOTE — PROGRESS NOTE ADULT - SUBJECTIVE AND OBJECTIVE BOX
Subjective:  No overnight events    Medications:  acetaminophen    Suspension .. 650 milliGRAM(s) Oral every 6 hours PRN  aMIOdarone    Tablet 200 milliGRAM(s) Oral daily  chlorhexidine 0.12% Liquid 15 milliLiter(s) Oral Mucosa every 12 hours  chlorhexidine 2% Cloths 1 Application(s) Topical <User Schedule>  dexMEDEtomidine Infusion 0.5 MICROgram(s)/kG/Hr IV Continuous <Continuous>  dextrose 50% Injectable 50 milliLiter(s) IV Push every 15 minutes  heparin  Infusion 400 Unit(s)/Hr IV Continuous <Continuous>  insulin lispro (ADMELOG) corrective regimen sliding scale   SubCutaneous every 6 hours  metoclopramide Injectable 10 milliGRAM(s) IV Push every 8 hours  pantoprazole  Injectable 40 milliGRAM(s) IV Push every 12 hours  piperacillin/tazobactam IVPB.. 3.375 Gram(s) IV Intermittent every 8 hours  propofol Infusion 20 MICROgram(s)/kG/Min IV Continuous <Continuous>  sodium chloride 0.9% lock flush 3 milliLiter(s) IV Push every 8 hours  sodium chloride 0.9%. 1000 milliLiter(s) IV Continuous <Continuous>    Vitals:  T(C): 38.5 (21 @ 12:00), Max: 38.6 (21 @ 22:00)  HR: 66 (21 @ 15:31) (59 - 75)  BP: --  BP(mean): --  RR: 35 (21 @ 15:00) (19 - 35)  SpO2: 100% (21 @ 15:31) (98% - 100%)    Daily     Daily Weight in k.4 (2021 00:00)        I&O's Summary    2021 07:01  -  2021 07:00  --------------------------------------------------------  IN: 2557.6 mL / OUT: 4320 mL / NET: -1762.4 mL    2021 07:01  -  2021 15:45  --------------------------------------------------------  IN: 597.4 mL / OUT: 530 mL / NET: 67.4 mL        Physical Exam:  Appearance: Intubated, sedated  HEENT: JVP non elevated  Cardiovascular: VAD hums   Respiratory: Clear to auscultation bilaterally  Gastrointestinal: Soft, Non-tender, non-distended	  Skin: no skin lesions  Neurologic: sedated  Extremities: No LE edema, warm and well perfused  Psychiatry: unable to assess     LVAD interrogation   Flow: 4.6  Speed: 9000  PI: 5.3  Power: 5   Alarms: none  Changes to device programming: increase speed from 8800->9000      Labs:                        8.5    12.66 )-----------( 142      ( 2021 00:33 )             26.9         139  |  105  |  13  ----------------------------<  124<H>  4.1   |  18<L>  |  0.80    Ca    9.1      2021 00:33  Phos  1.8       Mg     2.1         TPro  7.0  /  Alb  3.6  /  TBili  0.9  /  DBili  x   /  AST  22  /  ALT  15  /  AlkPhos  123<H>        PT/INR - ( 2021 00:33 )   PT: 14.8 sec;   INR: 1.25 ratio         PTT - ( 2021 09:12 )  PTT:38.0 sec          Lactate Dehydrogenase, Serum: 318 U/L ( @ 00:33)  Lactate Dehydrogenase, Serum: 297 U/L ( @ 01:36)

## 2021-06-19 NOTE — PROGRESS NOTE ADULT - SUBJECTIVE AND OBJECTIVE BOX
RICKY HAMPTON  MRN-[id number]  Patient is a 66y old  Male who presents with a chief complaint of Anemia, Supratherapeutic INR, Dark Stools (2021 19:17)    HPI:  65M PMH ACC/AHA stage D HF due to NICM HM2 LVAD , TV annuloplasty ring 17 as destination therapy due to severe peripheral artery disease with significant stenosis  SIADH, Depression, CKD-3 with hyperkalemia, past E. coli UTIs, driveline drainage (21) and COVID-19 (back in 2020)  He was recently seen in clinic where he complained of abdominal pain and dark stools w constipation back in May. He presents to [facility] ER today weakness and fatigue, moderate and + Black stools for three days, on coumadin secondary to warfarin use in the setting of an LVAD. Patient has required transfusions for GIB in the past. Mostly recently back in 2021 pt had anemia with dark stools. No interventions was done at that time. However Last Endoscopy was done in 2020 (negative). Today labs show patient is anemic with H/H of 4.5/16.3,. INR is 8.84 MAP in the 90s, Temp 35.1. He denies any chest pain, shortness of breath, dizziness, abd pain, nausea or vomiting.       (2021 16:57)      Surgery/Hospital course:   admit for melena w/ anemia, INR 8.84   6/15 Capsul study (+) for small bowel bleed, balloon endoscopy (old blood in prox ileum); post EGD - septic w/ L opacity, re-intubated for concern for aspiration, TTE (Mod MR, decrease biV w/ interventricular septum boweing towards R)   bronch     Today/Overnight:    Vital Signs Last 24 Hrs  T(C): 37.4 (2021 08:00), Max: 38.6 (2021 22:00)  T(F): 99.3 (2021 08:00), Max: 101.5 (2021 22:00)  HR: 60 (2021 08:00) (56 - 75)  BP: --  BP(mean): --  RR: 20 (2021 08:00) (20 - 35)  SpO2: 100% (2021 08:00) (98% - 100%)  ============================I/O===========================  I&O's Summary    2021 07:01  -  2021 07:00  --------------------------------------------------------  IN: 2557.6 mL / OUT: 4320 mL / NET: -1762.4 mL    2021 07:01  -  2021 08:08  --------------------------------------------------------  IN: 47.5 mL / OUT: 125 mL / NET: -77.5 mL      ============================ LABS =========================                      8.5    12.66 )-----------( 142      ( 2021 00:33 )             26.9     -19    139  |  105  |  13  ----------------------------<  124<H>  4.1   |  18<L>  |  0.80    Ca    9.1      2021 00:33  Phos  1.8       Mg     2.1         TPro  7.0  /  Alb  3.6  /  TBili  0.9  /  DBili  x   /  AST  22  /  ALT  15  /  AlkPhos  123<H>  19    LIVER FUNCTIONS - ( 2021 00:33 )  Alb: 3.6 g/dL / Pro: 7.0 g/dL / ALK PHOS: 123 U/L / ALT: 15 U/L / AST: 22 U/L / GGT: x           PT/INR - ( 2021 00:33 )   PT: 14.8 sec;   INR: 1.25 ratio         PTT - ( 2021 04:39 )  PTT:37.6 sec  ABG - ( 2021 00:29 )  pH, Arterial: 7.46  pH, Blood: x     /  pCO2: 32    /  pO2: 92    / HCO3: 23    / Base Excess: -.2   /  SaO2: 98                Urinalysis Basic - ( 2021 22:06 )    Color: Yellow / Appearance: Clear / S.027 / pH: x  Gluc: x / Ketone: Moderate  / Bili: Negative / Urobili: 2 mg/dL   Blood: x / Protein: 30 mg/dL / Nitrite: Negative   Leuk Esterase: Moderate / RBC: 140 /hpf / WBC 6 /HPF   Sq Epi: x / Non Sq Epi: 2 /hpf / Bacteria: Negative      ======================Micro/Rad/Cardio=================  Culture: Reviewed   CXR: Reviewed  Echo: Reviewed  ======================================================  PAST MEDICAL & SURGICAL HISTORY:  CHF (congestive heart failure)    CAD (coronary artery disease)    Depression    Pleural effusion    History of 2019 novel coronavirus disease (COVID-19)  2020    Hemorrhoids    Bleeding hemorrhoids    Peripheral arterial disease    Claudication    BPH with urinary obstruction    ACC/AHA stage D systolic heart failure    Anticoagulation goal of INR 2.0 to 2.5    Falls    Clavicle fracture    CKD (chronic kidney disease), stage III    Iron deficiency anemia    H/O epistaxis    Vertigo    GI bleed    S/P TVR (tricuspid valve repair)    S/P ventricular assist device    S/P endoscopy      ========================ASSESSMENT ================  Stage D Nonischemic Cardiomyopathy, Status Post HM2 on 2017   Recent driveline infection on 2021   Acute hypoxemic respiratory failure  aspiration pneumonia  septic shock  cardiogenic shock  GI bleed , Status Post Enteroscopy   Anemia, in setting of melena   Chronic Kidney Disease  Thrombocytopenia   Leukocytosis  coagulopathy   Stress hyperglycemia   Hemodynamic instability     Plan:  ====================== NEUROLOGY=====================  Addressed analgesic regimen to optimize function and sedated for ventilatory synchrony. Continue to monitor neuro status when PT is off sedation.      ==================== RESPIRATORY======================  Respiratory status required full ventilatory support, close monitoring of respiratory rate and breathing pattern, the following of ABG’s with A-line monitoring, continuous pulse oximetry monitoring    Mechanical Ventilation:  Mode: AC/ CMV (Assist Control/ Continuous Mandatory Ventilation)  RR (machine): 20  TV (machine): 500  FiO2: 40  PEEP: 5  ITime: 1.1  MAP: 11  PIP: 22      ====================CARDIOVASCULAR==================  Stage D Nonischemic cardiomyopathy, s/p hm2 on 2017; recent driveline infection on 2021; HM2 settings: 8800rpm, flow 4.4. Admitted for anemia with supratherapeutic INR 8.84. Echo 6/15: severe global Left venticular systolic dysfunction with HM2 in place, decreased Right ventricular systolic function, interventricular septums bows slightly to right, otherwise grossly similar to prior. Rate control with Amiodarone. Pressor support with IV Vasopressin. Invasive hemodynamic monitoring with a central venous catheter & an A-line were required for the following continuous central venous pressure and MAP/BP monitoring to ensure adequate cardiovascular support. Monitor continuous telemetry.     ===================HEMATOLOGIC/ONC ===================  Monitor hemoglobin and hematocrit levels. Heparin continued for venous thromboembolism prophylaxis in addition to sequential compression devices    ===================== RENAL =========================  Optimize renal perfusion with adequate volume resuscitation and continued monitoring of urine output, fluid balance, BUN/Creatinine.     ==================== GASTROINTESTINAL===================  Patient had a notable bleed in small bowel movement underwent endoscopy on 6/15. Notable for old blood in proximal ileum, no acute intervention indicated. Tolerating Vital AF @ 55cc/hr. Continue Protonix for stress ulcer prophylaxis. Reglan for gut motility      =======================    ENDOCRINE  =====================  Metabolic stability, stress hyperglycemia required an IV regular Insulin drip while following serial glucose levels to help achieve and maintain euglycemia.      ========================INFECTIOUS DISEASE================  Current temperature 99.3 with white blood count down trending 14.79 -> 12.66. Continue to monitor temperature and white blood count. Concern for sepsis +rigors with temp up to 101.0F on 6/15, continue with Zosyn. Continue empiric coverage with vancomycin.         Patient requires continuous monitoring with bedside rhythm monitoring, pulse oximetry monitoring, and continuous central venous and arterial pressure monitoring; and intermittent blood gas analysis.  Care plan discussed with ICU care team.    Patient remained critical, at risk for life threatening decompensation.   I have spent ___ minutes providing acute care with multiple re-evaluations throughout the evening.     By signing my name below, I, Rojas Junior, attest that this documentation has been prepared under the direction and in the presence of Aleksandra Marte MD   Electronically signed: Cesia Cohen, 21 @ 08:08    I, Aleksandra Marte, personally performed the services described in this documentation. All medical record entries made by the luisibe were at my direction and in my presence. I have reviewed the chart and agree that the record reflects my personal performance and is accurate and complete  Electronically signed: Aleksandra Marte MD 21 @ 08:08       RICKY HAMPTON  MRN-[id number]  Patient is a 66y old  Male who presents with a chief complaint of Anemia, Supratherapeutic INR, Dark Stools (2021 19:17)    HPI:  65M PMH ACC/AHA stage D HF due to NICM HM2 LVAD , TV annuloplasty ring 17 as destination therapy due to severe peripheral artery disease with significant stenosis  SIADH, Depression, CKD-3 with hyperkalemia, past E. coli UTIs, driveline drainage (21) and COVID-19 (back in 2020)  He was recently seen in clinic where he complained of abdominal pain and dark stools w constipation back in May. He presents to [facility] ER today weakness and fatigue, moderate and + Black stools for three days, on coumadin secondary to warfarin use in the setting of an LVAD. Patient has required transfusions for GIB in the past. Mostly recently back in 2021 pt had anemia with dark stools. No interventions was done at that time. However Last Endoscopy was done in 2020 (negative). Today labs show patient is anemic with H/H of 4.5/16.3,. INR is 8.84 MAP in the 90s, Temp 35.1. He denies any chest pain, shortness of breath, dizziness, abd pain, nausea or vomiting.       (2021 16:57)      Surgery/Hospital course:   admit for melena w/ anemia, INR 8.84   6/15 Capsul study (+) for small bowel bleed, balloon endoscopy (old blood in prox ileum); post EGD - septic w/ L opacity, re-intubated for concern for aspiration, TTE (Mod MR, decrease biV w/ interventricular septum boweing towards R)   bronch     Today/Overnight: Patient has been CPAPing for 5 hours overnight, and will cotninue to CPAP today. Per ID will hold vanco after giving 1 dosage yesterday. Will advance diet after trickle feed has been started yesterday. Continue with diuretics to maintain net even fluid balance.      Vital Signs Last 24 Hrs  T(C): 37.4 (2021 08:00), Max: 38.6 (2021 22:00)  T(F): 99.3 (2021 08:00), Max: 101.5 (2021 22:00)  HR: 60 (2021 08:00) (56 - 75)  BP: --  BP(mean): --  RR: 20 (2021 08:00) (20 - 35)  SpO2: 100% (2021 08:00) (98% - 100%)  ============================I/O===========================  I&O's Summary    2021 07:01  -  2021 07:00  --------------------------------------------------------  IN: 2557.6 mL / OUT: 4320 mL / NET: -1762.4 mL    2021 07:01  -  2021 08:08  --------------------------------------------------------  IN: 47.5 mL / OUT: 125 mL / NET: -77.5 mL      ============================ LABS =========================                      8.5    12.66 )-----------( 142      ( 2021 00:33 )             26.9     06-19    139  |  105  |  13  ----------------------------<  124<H>  4.1   |  18<L>  |  0.80    Ca    9.1      2021 00:33  Phos  1.8       Mg     2.1         TPro  7.0  /  Alb  3.6  /  TBili  0.9  /  DBili  x   /  AST  22  /  ALT  15  /  AlkPhos  123<H>  06-19    LIVER FUNCTIONS - ( 2021 00:33 )  Alb: 3.6 g/dL / Pro: 7.0 g/dL / ALK PHOS: 123 U/L / ALT: 15 U/L / AST: 22 U/L / GGT: x           PT/INR - ( 2021 00:33 )   PT: 14.8 sec;   INR: 1.25 ratio         PTT - ( 2021 04:39 )  PTT:37.6 sec  ABG - ( 2021 00:29 )  pH, Arterial: 7.46  pH, Blood: x     /  pCO2: 32    /  pO2: 92    / HCO3: 23    / Base Excess: -.2   /  SaO2: 98                Urinalysis Basic - ( 2021 22:06 )    Color: Yellow / Appearance: Clear / S.027 / pH: x  Gluc: x / Ketone: Moderate  / Bili: Negative / Urobili: 2 mg/dL   Blood: x / Protein: 30 mg/dL / Nitrite: Negative   Leuk Esterase: Moderate / RBC: 140 /hpf / WBC 6 /HPF   Sq Epi: x / Non Sq Epi: 2 /hpf / Bacteria: Negative      ======================Micro/Rad/Cardio=================  Culture: Reviewed   CXR: Reviewed  Echo: Reviewed  ======================================================  PAST MEDICAL & SURGICAL HISTORY:  CHF (congestive heart failure)    CAD (coronary artery disease)    Depression    Pleural effusion    History of 2019 novel coronavirus disease (COVID-19)  2020    Hemorrhoids    Bleeding hemorrhoids    Peripheral arterial disease    Claudication    BPH with urinary obstruction    ACC/AHA stage D systolic heart failure    Anticoagulation goal of INR 2.0 to 2.5    Falls    Clavicle fracture    CKD (chronic kidney disease), stage III    Iron deficiency anemia    H/O epistaxis    Vertigo    GI bleed    S/P TVR (tricuspid valve repair)    S/P ventricular assist device    S/P endoscopy      ========================ASSESSMENT ================  Stage D Nonischemic Cardiomyopathy, Status Post HM2 on 2017   Recent driveline infection on 2021   Acute hypoxemic respiratory failure  aspiration pneumonia  septic shock  cardiogenic shock  GI bleed , Status Post Enteroscopy   Anemia, in setting of melena   Chronic Kidney Disease  Thrombocytopenia   Leukocytosis  coagulopathy   Stress hyperglycemia   Hemodynamic instability     Plan:  ====================== NEUROLOGY=====================  Addressed analgesic regimen to optimize function and sedated for ventilatory synchrony. Continue to monitor neuro status when PT is off sedation.      ==================== RESPIRATORY======================  Respiratory status required full ventilatory support, close monitoring of respiratory rate and breathing pattern, the following of ABG’s with A-line monitoring, continuous pulse oximetry monitoring. Patient has been CPAPing for 5 hours overnight, and will cotninue to CPAP today.     Mechanical Ventilation:  Mode: AC/ CMV (Assist Control/ Continuous Mandatory Ventilation)  RR (machine): 20  TV (machine): 500  FiO2: 40  PEEP: 5  ITime: 1.1  MAP: 11  PIP: 22      ====================CARDIOVASCULAR==================  Stage D Nonischemic cardiomyopathy, s/p hm2 on 2017; recent driveline infection on 2021; HM2 settings: 8800rpm, flow 4.4. Admitted for anemia with supratherapeutic INR 8.84. Echo 6/15: severe global Left venticular systolic dysfunction with HM2 in place, decreased Right ventricular systolic function, interventricular septums bows slightly to right, otherwise grossly similar to prior. Rate control with Amiodarone. Pressor support with IV Vasopressin. Invasive hemodynamic monitoring with a central venous catheter & an A-line were required for the following continuous central venous pressure and MAP/BP monitoring to ensure adequate cardiovascular support. Monitor continuous telemetry.     ===================HEMATOLOGIC/ONC ===================  Monitor hemoglobin and hematocrit levels. Heparin continued for venous thromboembolism prophylaxis in addition to sequential compression devices    ===================== RENAL =========================  Optimize renal perfusion with adequate volume resuscitation and continued monitoring of urine output, fluid balance, BUN/Creatinine. Continue with diuretics to maintain net even fluid balance.     ==================== GASTROINTESTINAL===================  Patient had a notable bleed in small bowel movement underwent endoscopy on 6/15. Notable for old blood in proximal ileum, no acute intervention indicated. Tolerating Vital AF @ 55cc/hr. Continue Protonix for stress ulcer prophylaxis. Reglan for gut motility. Will advance diet after trickle feed has been started yesterday.     =======================    ENDOCRINE  =====================  Metabolic stability, stress hyperglycemia required an IV regular Insulin drip while following serial glucose levels to help achieve and maintain euglycemia.      ========================INFECTIOUS DISEASE================  Current temperature 99.3 with white blood count down trending 14.79 -> 12.66. Continue to monitor temperature and white blood count. Concern for sepsis +rigors with temp up to 101.0F on 6/15, continue with Zosyn. Continue empiric coverage with vancomycin.         Patient requires continuous monitoring with bedside rhythm monitoring, pulse oximetry monitoring, and continuous central venous and arterial pressure monitoring; and intermittent blood gas analysis.  Care plan discussed with ICU care team.    Patient remained critical, at risk for life threatening decompensation.   I have spent ___ minutes providing acute care with multiple re-evaluations throughout the evening.     By signing my name below, I, Rojas Junior, attest that this documentation has been prepared under the direction and in the presence of Aleksandra Marte MD   Electronically signed: Cesia Cohen, 21 @ 08:08    I, Aleksandra Marte, personally performed the services described in this documentation. All medical record entries made by the scribe were at my direction and in my presence. I have reviewed the chart and agree that the record reflects my personal performance and is accurate and complete  Electronically signed: Aleksandra Marte MD 21 @ 08:08       RICKY HAMPTON  MRN-[id number]  Patient is a 66y old  Male who presents with a chief complaint of Anemia, Supratherapeutic INR, Dark Stools (2021 19:17)    HPI:  65M PMH ACC/AHA stage D HF due to NICM HM2 LVAD , TV annuloplasty ring 17 as destination therapy due to severe peripheral artery disease with significant stenosis  SIADH, Depression, CKD-3 with hyperkalemia, past E. coli UTIs, driveline drainage (21) and COVID-19 (back in 2020)  He was recently seen in clinic where he complained of abdominal pain and dark stools w constipation back in May. He presents to [facility] ER today weakness and fatigue, moderate and + Black stools for three days, on coumadin secondary to warfarin use in the setting of an LVAD. Patient has required transfusions for GIB in the past. Mostly recently back in 2021 pt had anemia with dark stools. No interventions was done at that time. However Last Endoscopy was done in 2020 (negative). Today labs show patient is anemic with H/H of 4.5/16.3,. INR is 8.84 MAP in the 90s, Temp 35.1. He denies any chest pain, shortness of breath, dizziness, abd pain, nausea or vomiting.       (2021 16:57)      Surgery/Hospital course:   admit for melena w/ anemia, INR 8.84   6/15 Capsul study (+) for small bowel bleed, balloon endoscopy (old blood in prox ileum); post EGD - septic w/ L opacity, re-intubated for concern for aspiration, TTE (Mod MR, decrease biV w/ interventricular septum boweing towards R)   bronch     Today/Overnight: Weaning sedation in order to allow for CPAP trials    Vital Signs Last 24 Hrs  T(C): 37.4 (2021 08:00), Max: 38.6 (2021 22:00)  T(F): 99.3 (2021 08:00), Max: 101.5 (2021 22:00)  HR: 60 (2021 08:00) (56 - 75)  BP: --  BP(mean): --  RR: 20 (2021 08:00) (20 - 35)  SpO2: 100% (2021 08:00) (98% - 100%)  ============================I/O===========================  I&O's Summary    2021 07:01  -  2021 07:00  --------------------------------------------------------  IN: 2557.6 mL / OUT: 4320 mL / NET: -1762.4 mL    2021 07:01  -  2021 08:08  --------------------------------------------------------  IN: 47.5 mL / OUT: 125 mL / NET: -77.5 mL      ============================ LABS =========================                      8.5    12.66 )-----------( 142      ( 2021 00:33 )             26.9     -19    139  |  105  |  13  ----------------------------<  124<H>  4.1   |  18<L>  |  0.80    Ca    9.1      2021 00:33  Phos  1.8       Mg     2.1         TPro  7.0  /  Alb  3.6  /  TBili  0.9  /  DBili  x   /  AST  22  /  ALT  15  /  AlkPhos  123<H>      LIVER FUNCTIONS - ( 2021 00:33 )  Alb: 3.6 g/dL / Pro: 7.0 g/dL / ALK PHOS: 123 U/L / ALT: 15 U/L / AST: 22 U/L / GGT: x           PT/INR - ( 2021 00:33 )   PT: 14.8 sec;   INR: 1.25 ratio         PTT - ( 2021 04:39 )  PTT:37.6 sec  ABG - ( 2021 00:29 )  pH, Arterial: 7.46  pH, Blood: x     /  pCO2: 32    /  pO2: 92    / HCO3: 23    / Base Excess: -.2   /  SaO2: 98                Urinalysis Basic - ( 2021 22:06 )    Color: Yellow / Appearance: Clear / S.027 / pH: x  Gluc: x / Ketone: Moderate  / Bili: Negative / Urobili: 2 mg/dL   Blood: x / Protein: 30 mg/dL / Nitrite: Negative   Leuk Esterase: Moderate / RBC: 140 /hpf / WBC 6 /HPF   Sq Epi: x / Non Sq Epi: 2 /hpf / Bacteria: Negative      ======================Micro/Rad/Cardio=================  Culture: Reviewed   CXR: Reviewed  Echo: Reviewed  ======================================================  PAST MEDICAL & SURGICAL HISTORY:  CHF (congestive heart failure)    CAD (coronary artery disease)    Depression    Pleural effusion    History of 2019 novel coronavirus disease (COVID-19)  2020    Hemorrhoids    Bleeding hemorrhoids    Peripheral arterial disease    Claudication    BPH with urinary obstruction    ACC/AHA stage D systolic heart failure    Anticoagulation goal of INR 2.0 to 2.5    Falls    Clavicle fracture    CKD (chronic kidney disease), stage III    Iron deficiency anemia    H/O epistaxis    Vertigo    GI bleed    S/P TVR (tricuspid valve repair)    S/P ventricular assist device    S/P endoscopy      ========================ASSESSMENT ================  Stage D Nonischemic Cardiomyopathy, Status Post HM2 on 2017   Recent driveline infection on 2021   Acute hypoxemic respiratory failure  aspiration pneumonia  septic shock  cardiogenic shock  GI bleed , Status Post Enteroscopy   Anemia, in setting of melena   Chronic Kidney Disease  Thrombocytopenia   Leukocytosis  coagulopathy   Stress hyperglycemia   Hemodynamic instability     Plan:  ====================== NEUROLOGY=====================  Addressed analgesic regimen to optimize function and sedated for ventilatory synchrony. Continues on Precedex and Diprivan to prevent agitation and pulling on tubes and lines. Moves all extremities.    ==================== RESPIRATORY======================  Respiratory status required full ventilatory support, close monitoring of respiratory rate and breathing pattern, the following of ABG’s with A-line monitoring, continuous pulse oximetry monitoring. Patient tolerated several hours on pressure support of 15 and after period of rest is now on pressure support of 10. Due to borderline rapid shallow breathing index, patient is not yet a candidate for extubation.    Mode: AC/ CMV (Assist Control/ Continuous Mandatory Ventilation)  FiO2: 40  PEEP: 5  PS: 10  MAP: 9  PIP: 15      ====================CARDIOVASCULAR==================  Stage D Nonischemic cardiomyopathy, s/p hm2 on 2017; recent driveline infection on 2021; HM2 settings: 8800rpm, flow 4.4, now increased to 9000 rpm due to MR and to help with volume overload after transfusion and volume resuscitation. Echo 6/15: severe global Left venticular systolic dysfunction with HM2 in place, decreased Right ventricular systolic function, interventricular septums bows slightly to right, otherwise grossly similar to prior. Rate and rhythm control with Amiodarone. Continue invasive hemodynamic monitoring with a central venous catheter & an A-line were required for continuous central venous pressure and MAP/BP monitoring to ensure adequate cardiovascular support. Monitor continuous telemetry.     ===================HEMATOLOGIC/ONC ===================  Anemia due to acute blood loss. No current plan for transfusion. Monitor hemoglobin and hematocrit levels. Heparin continued for venous thromboembolism prophylaxis in addition to sequential compression devices    ===================== RENAL =========================  Optimize renal perfusion with adequate volume resuscitation and continued monitoring of urine output, fluid balance, and BUN/Creatinine. Continue with diuretics to maintain net even fluid balance.     ==================== GASTROINTESTINAL===================  Patient had a notable bleed in small bowel on capsule study. Patient underwent endoscopy on 6/15, notable for old blood in proximal ileum, no acute intervention indicated. Plan to slowly advance Vital AF to goal of 55cc/hr, currently at 20 cc/hr.  Continue Protonix for stress ulcer prophylaxis. Reglan for gut motility.     =======================    ENDOCRINE  =====================  Metabolic stability, stress hyperglycemia required a lispro Insulin sliding scale while following serial glucose levels to help achieve and maintain euglycemia.      ========================INFECTIOUS DISEASE================  Current temperature 99.3 with white blood count down trending 14.79 -> 12.66. Continue to monitor temperature and white blood count. Concern for sepsis +rigors with temp up to 101.0F on 6/15, continue  Continue empiric coverage with Zosyn for possible aspiration pneumonia. Chest xray is clearing. Vancomycin discontinued as discussed with ID.        Patient requires continuous monitoring with bedside rhythm monitoring, pulse oximetry monitoring, and continuous central venous and arterial pressure monitoring; and intermittent blood gas analysis.  Care plan discussed with ICU care team.    Patient remained critical, at risk for life threatening decompensation.   I have spent 35 minutes providing acute care with multiple re-evaluations throughout the day.     By signing my name below, I, Rojas Junior, attest that this documentation has been prepared under the direction and in the presence of Aleksandra Marte MD   Electronically signed: Cesia Cohen, 21 @ 08:08    I, Aleksandra Marte, personally performed the services described in this documentation. All medical record entries made by the scribe were at my direction and in my presence. I have reviewed the chart and agree that the record reflects my personal performance and is accurate and complete  Electronically signed: Aleksandra Marte MD 21 @ 08:08       RICKY HAMPTON  MRN-[id number]  Patient is a 66y old  Male who presents with a chief complaint of Anemia, Supratherapeutic INR, Dark Stools (2021 19:17)    HPI:  65M PMH ACC/AHA stage D HF due to NICM HM2 LVAD , TV annuloplasty ring 17 as destination therapy due to severe peripheral artery disease with significant stenosis  SIADH, Depression, CKD-3 with hyperkalemia, past E. coli UTIs, driveline drainage (21) and COVID-19 (back in 2020)  He was recently seen in clinic where he complained of abdominal pain and dark stools w constipation back in May. He presents to [facility] ER today weakness and fatigue, moderate and + Black stools for three days, on coumadin secondary to warfarin use in the setting of an LVAD. Patient has required transfusions for GIB in the past. Mostly recently back in 2021 pt had anemia with dark stools. No interventions was done at that time. However Last Endoscopy was done in 2020 (negative). Today labs show patient is anemic with H/H of 4.5/16.3,. INR is 8.84 MAP in the 90s, Temp 35.1. He denies any chest pain, shortness of breath, dizziness, abd pain, nausea or vomiting.       (2021 16:57)      Surgery/Hospital course:   admit for melena w/ anemia, INR 8.84   6/15 Capsul study (+) for small bowel bleed, balloon endoscopy (old blood in prox ileum); post EGD - septic w/ L opacity, re-intubated for concern for aspiration, TTE (Mod MR, decrease biV w/ interventricular septum boweing towards R)   bronch     Today/Overnight: Weaning sedation in order to allow for CPAP trials    Vital Signs Last 24 Hrs  T(C): 37.4 (2021 08:00), Max: 38.6 (2021 22:00)  T(F): 99.3 (2021 08:00), Max: 101.5 (2021 22:00)  HR: 60 (2021 08:00) (56 - 75)  BP: --  BP(mean): --  RR: 20 (2021 08:00) (20 - 35)  SpO2: 100% (2021 08:00) (98% - 100%)  ============================I/O===========================  I&O's Summary    2021 07:01  -  2021 07:00  --------------------------------------------------------  IN: 2557.6 mL / OUT: 4320 mL / NET: -1762.4 mL    2021 07:01  -  2021 08:08  --------------------------------------------------------  IN: 47.5 mL / OUT: 125 mL / NET: -77.5 mL      ============================ LABS =========================                      8.5    12.66 )-----------( 142      ( 2021 00:33 )             26.9     -19    139  |  105  |  13  ----------------------------<  124<H>  4.1   |  18<L>  |  0.80    Ca    9.1      2021 00:33  Phos  1.8       Mg     2.1         TPro  7.0  /  Alb  3.6  /  TBili  0.9  /  DBili  x   /  AST  22  /  ALT  15  /  AlkPhos  123<H>      LIVER FUNCTIONS - ( 2021 00:33 )  Alb: 3.6 g/dL / Pro: 7.0 g/dL / ALK PHOS: 123 U/L / ALT: 15 U/L / AST: 22 U/L / GGT: x           PT/INR - ( 2021 00:33 )   PT: 14.8 sec;   INR: 1.25 ratio         PTT - ( 2021 04:39 )  PTT:37.6 sec  ABG - ( 2021 00:29 )  pH, Arterial: 7.46  pH, Blood: x     /  pCO2: 32    /  pO2: 92    / HCO3: 23    / Base Excess: -.2   /  SaO2: 98                Urinalysis Basic - ( 2021 22:06 )    Color: Yellow / Appearance: Clear / S.027 / pH: x  Gluc: x / Ketone: Moderate  / Bili: Negative / Urobili: 2 mg/dL   Blood: x / Protein: 30 mg/dL / Nitrite: Negative   Leuk Esterase: Moderate / RBC: 140 /hpf / WBC 6 /HPF   Sq Epi: x / Non Sq Epi: 2 /hpf / Bacteria: Negative      ======================Micro/Rad/Cardio=================  Culture: Reviewed   CXR: Reviewed  Echo: Reviewed  ======================================================  PAST MEDICAL & SURGICAL HISTORY:  CHF (congestive heart failure)    CAD (coronary artery disease)    Depression    Pleural effusion    History of 2019 novel coronavirus disease (COVID-19)  2020    Hemorrhoids    Bleeding hemorrhoids    Peripheral arterial disease    Claudication    BPH with urinary obstruction    ACC/AHA stage D systolic heart failure    Anticoagulation goal of INR 2.0 to 2.5    Falls    Clavicle fracture    CKD (chronic kidney disease), stage III    Iron deficiency anemia    H/O epistaxis    Vertigo    GI bleed    S/P TVR (tricuspid valve repair)    S/P ventricular assist device    S/P endoscopy      ========================ASSESSMENT ================  Stage D Nonischemic Cardiomyopathy, Status Post HM2 on 2017   Recent driveline infection on 2021   Acute hypoxemic respiratory failure  aspiration pneumonia  septic shock  cardiogenic shock  GI bleed , Status Post Enteroscopy   Anemia, in setting of melena   Chronic Kidney Disease  Thrombocytopenia   Leukocytosis  coagulopathy   Stress hyperglycemia   Hemodynamic instability     Plan:  ====================== NEUROLOGY=====================  Addressed analgesic regimen to optimize function and sedated for ventilatory synchrony. Continues on Precedex and Diprivan to prevent agitation and pulling on tubes and lines. Moves all extremities.    ==================== RESPIRATORY======================  Respiratory status required full ventilatory support, close monitoring of respiratory rate and breathing pattern, the following of ABG’s with A-line monitoring, continuous pulse oximetry monitoring. Patient tolerated several hours on pressure support of 15 and after period of rest is now on pressure support of 10. Due to borderline rapid shallow breathing index, patient is not yet a candidate for extubation.    Mode: AC/ CMV (Assist Control/ Continuous Mandatory Ventilation)  FiO2: 40  PEEP: 5  PS: 10  MAP: 9  PIP: 15      ====================CARDIOVASCULAR==================  Stage D Nonischemic cardiomyopathy, s/p hm2 on 2017; recent driveline infection on 2021; HM2 settings: 8800rpm, flow 4.4, now increased to 9000 rpm due to MR and to help with volume overload after transfusion and volume resuscitation. Echo 6/15: severe global Left venticular systolic dysfunction with HM2 in place, decreased Right ventricular systolic function, interventricular septums bows slightly to right, otherwise grossly similar to prior. Rate and rhythm control with Amiodarone. Continue invasive hemodynamic monitoring with a central venous catheter & an A-line were required for continuous central venous pressure and MAP/BP monitoring to ensure adequate cardiovascular support. Monitor continuous telemetry.     ===================HEMATOLOGIC/ONC ===================  Anemia due to acute blood loss. No current plan for transfusion. Monitor hemoglobin and hematocrit levels. Now IV Heparin resumed for LVAD with lower PTT goal of around 50 in light of recent bleed. IV heparin will also cover for venous thromboembolism prophylaxis in addition to sequential compression devices    ===================== RENAL =========================  Optimize renal perfusion with adequate volume resuscitation and continued monitoring of urine output, fluid balance, and BUN/Creatinine. Continue with diuretics to maintain net even fluid balance.     ==================== GASTROINTESTINAL===================  Patient had a notable bleed in small bowel on capsule study. Patient underwent endoscopy on 6/15, notable for old blood in proximal ileum, no acute intervention indicated. Plan to slowly advance Vital AF to goal of 55cc/hr, currently at 20 cc/hr.  Continue Protonix for stress ulcer prophylaxis. Reglan for gut motility.     =======================    ENDOCRINE  =====================  Metabolic stability, stress hyperglycemia required a lispro Insulin sliding scale while following serial glucose levels to help achieve and maintain euglycemia.      ========================INFECTIOUS DISEASE================  Current temperature 99.3 with white blood count down trending 14.79 -> 12.66. Continue to monitor temperature and white blood count. Concern for sepsis +rigors with temp up to 101.0F on 6/15, continue  Continue empiric coverage with Zosyn for possible aspiration pneumonia. Chest xray is clearing. Vancomycin discontinued as discussed with ID.        Patient requires continuous monitoring with bedside rhythm monitoring, pulse oximetry monitoring, and continuous central venous and arterial pressure monitoring; and intermittent blood gas analysis.  Care plan discussed with ICU care team.    Patient remained critical, at risk for life threatening decompensation.   I have spent 35 minutes providing acute care with multiple re-evaluations throughout the day.     By signing my name below, I, Rojas Junior, attest that this documentation has been prepared under the direction and in the presence of Aleksandra Marte MD   Electronically signed: Cesia Cohen, 21 @ 08:08    I, Aleksandra Marte, personally performed the services described in this documentation. All medical record entries made by the scribe were at my direction and in my presence. I have reviewed the chart and agree that the record reflects my personal performance and is accurate and complete  Electronically signed: Aleksandra Marte MD 21 @ 08:08

## 2021-06-19 NOTE — CONSULT NOTE ADULT - SUBJECTIVE AND OBJECTIVE BOX
CHIEF COMPLAINT: fatigue weakness , GI bleeding     pulmonary consultation : recurrent Acute hypoxic respiratory Failure ,aspiration pneumonia, NICM  , chart reviewed and H/O obtained radiological and Laboratory study reviewed patient Examined     65M PMH ACC/AHA stage D HF due to NICM HM2 LVAD , TV annuloplasty ring 17 as destination therapy due to severe peripheral artery disease with significant stenosis  SIADH, Depression, CKD-3 with hyperkalemia, past E. coli UTIs, driveline drainage (21) and COVID-19 (back in 2020)  He was recently seen in clinic where he complained of abdominal pain and dark stools w constipation back in May. He presents to [facility] ER today weakness and fatigue, moderate and + Black stools for three days, on coumadin secondary to warfarin use in the setting of an LVAD. Patient has required transfusions for GIB in the past. Mostly recently back in 2021 pt had anemia with dark stools. No interventions was done at that time. However Last Endoscopy was done in 2020 (negative). Today labs show patient is anemic with H/H of 4.5/16.3,. INR is 8.84 MAP in the 90s, Temp 35.1. He denies any chest pain, shortness of breath, dizziness, abd pain, nausea or vomiting. found to have  rectal bleeding underwent endoscopy ,old blood in the proximal ileum ,  develop sepsis with LL opacity given Antibiotics , Extubated , reintubated , Bronchoscopy on Zosyn for LL pneumonia  and Amiodarone S/P TV Annuloplasty , patient remain intubated on full ventilatory support .S/P multiple unts of blood transfusion      (2021 16:57)    PAST MEDICAL & SURGICAL HISTORY:  CHF (congestive heart failure)    CAD (coronary artery disease)    Depression    Pleural effusion    History of 2019 novel coronavirus disease (COVID-19)  2020    Hemorrhoids    Bleeding hemorrhoids    Peripheral arterial disease    Claudication    BPH with urinary obstruction    ACC/AHA stage D systolic heart failure    Anticoagulation goal of INR 2.0 to 2.5    Falls    Clavicle fracture    CKD (chronic kidney disease), stage III    Iron deficiency anemia    H/O epistaxis    Vertigo    GI bleed    S/P TVR (tricuspid valve repair)    S/P ventricular assist device    S/P endoscopy        FAMILY HISTORY:    Social History:    Marital Status:  X    (   ) Single    (   )    (  )   Occupation: Retired   Lives with: (  ) alone  (  ) children  X spouse   (  ) parents  (  ) other    Substance Use (street drugs): X never used  (  ) other:  Tobacco Usage: X never smoked   (   ) former smoker   (   ) current smoker  (     ) pack year  (    ) last cigarette date  Alcohol Usage: Social      OBJECTIVE:  Vital Signs Last 24 Hrs  T(C): 38.5 (2021 12:00), Max: 38.6 (2021 22:00)  T(F): 101.3 (2021 12:00), Max: 101.5 (2021 22:00)  HR: 66 (2021 14:00) (59 - 75)  BP: --  BP(mean): --  RR: 33 (2021 14:) (19 - 33)  SpO2: 99% (2021 14:) (98% - 100%)  Mode: AC/ CMV (Assist Control/ Continuous Mandatory Ventilation), FiO2: 40, PEEP: 5, PS: 10, MAP: 9, PIP: 15     @ 07:01   @ 07:00  --------------------------------------------------------  IN: 2557.6 mL / OUT: 4320 mL / NET: -1762.4 mL     @ 07:01  -   @ 14:04  --------------------------------------------------------  IN: 474.4 mL / OUT: 480 mL / NET: -5.6 mL      HOSPITAL MEDICATIONS:   acetaminophen    Suspension .. 650 milliGRAM(s) Oral every 6 hours PRN  aMIOdarone    Tablet 200 milliGRAM(s) Oral daily  chlorhexidine 0.12% Liquid 15 milliLiter(s) Oral Mucosa every 12 hours  chlorhexidine 2% Cloths 1 Application(s) Topical <User Schedule>  dexMEDEtomidine Infusion 0.5 MICROgram(s)/kG/Hr IV Continuous <Continuous>  dextrose 50% Injectable 50 milliLiter(s) IV Push every 15 minutes  heparin  Infusion 400 Unit(s)/Hr IV Continuous <Continuous>  insulin lispro (ADMELOG) corrective regimen sliding scale   SubCutaneous every 6 hours  metoclopramide Injectable 10 milliGRAM(s) IV Push every 8 hours  pantoprazole  Injectable 40 milliGRAM(s) IV Push every 12 hours  piperacillin/tazobactam IVPB.. 3.375 Gram(s) IV Intermittent every 8 hours  propofol Infusion 20 MICROgram(s)/kG/Min IV Continuous <Continuous>  sodium chloride 0.9% lock flush 3 milliLiter(s) IV Push every 8 hours  sodium chloride 0.9%. 1000 milliLiter(s) IV Continuous <Continuous>    No Known Allergies    CONSTITUTIONAL: cant be obtained patient is intubated and fully sedated on propofol   PHYSICAL EXAM:Daily   Elderly male intubated sedated  on full ventilatory support /40%20/5cm PEEP  0n vasopressin   Daily Weight in k.4 (2021 00:00)  HEENT:     + NCAT  + EOMI  - Conjuctival edema   - Icterus   - Thrush   - ETT  + NGT/OGT  Neck:         + FROM   RT IJ  JVD     - Nodes     - Masses    + Mid-line trachea   - Tracheostomy  Chest: normal findings  Lungs:          + CTA   + Rhonchi    - Rales    - Wheezing     - Decreased BS   - Dullness R L  Cardiac:       + S1 + S2    + RRR   - Irregular   - S3  - S4    - Murmurs   - Rub   - Hamman’s sign   Abdomen:    + BS     + Soft    + Non-tender     - Distended    - Organomegaly  - PEG  Extremities:   - Cyanosis U/L   - Clubbing  U/L  + LE/UE Edema   + Capillary refill    + Pulses   Neuro:        - Awake   -  Alert   - Confused   - Lethargic   + Sedated  + Generalized Weakness  Skin:        - Rashes    - Erythema   + Normal incisions   + IV sites intact  - Sacral decubitus    LABS:                        8.5    12.66 )-----------( 142      ( 2021 00:33 )             26.9     06-19    139  |  105  |  13  ----------------------------<  124<H>  4.1   |  18<L>  |  0.80    Ca    9.1      2021 00:33  Phos  1.8     06-19  Mg     2.1         TPro  7.0  /  Alb  3.6  /  TBili  0.9  /  DBili  x   /  AST  22  /  ALT  15  /  AlkPhos  123<H>      PT/INR - ( 2021 00:33 )   PT: 14.8 sec;   INR: 1.25 ratio         PTT - ( 2021 09:12 )  PTT:38.0 sec  LIVER FUNCTIONS - ( 2021 00:33 )  Alb: 3.6 g/dL / Pro: 7.0 g/dL / ALK PHOS: 123 U/L / ALT: 15 U/L / AST: 22 U/L / GGT: x           Arterial Blood Gas:   @ 00:29  7.46/32/92/23/98/-.2  ABG lactate: --  Arterial Blood Gas:   @ 11:52  7.42/37/95/24/98/.3  ABG lactate: --  Arterial Blood Gas:   @ 06:29  7.44/34/84/23/98/-.6  ABG lactate: --  Arterial Blood Gas:   @ 05:35  7.39/34/92/20/97/-3.6  ABG lactate: --  Arterial Blood Gas:   @ 00:08  7.43/34/90/22/97/-1.4  ABG lactate: --  Arterial Blood Gas:   @ 17:49  7.46/31/93/22/98/-1.2  ABG lactate: --  Arterial Blood Gas:   @ 15:21  7.43/35/76/22/96/-1.1  ABG lactate: --  Arterial Blood Gas:   @ 14:06  7.39/36/176/21/100/-3.0  ABG lactate: --   Venous Blood Gas:   @ 17:49  7.40/40/30/24/52  VBG Lactate: 1.1        LIVER FUNCTIONS - ( 2021 00:33 )  Alb: 3.6 g/dL / Pro: 7.0 g/dL / ALK PHOS: 123 U/L / ALT: 15 U/L / AST: 22 U/L / GGT: x             RADIOLOGY:  X Reviewed and interpreted by me LLL  pneumonia , LVAD HM2

## 2021-06-19 NOTE — PROGRESS NOTE ADULT - PROBLEM SELECTOR PLAN 2
- respiratory and blood cultures NGTD and procalcitonon normal  - BAL w/ hyperemic airways, but no hemorrhage  - ID following, continue empiric zosyn for now  - wean pressors as tolerates, may improve as sedation comes off  - Pt w/ bouts of emesis o/n, f/u GI concern for acute GI pathology

## 2021-06-19 NOTE — PROGRESS NOTE ADULT - PROBLEM SELECTOR PLAN 4
- Increased speed today from 8800->9000 today to better unload ventricle. Would repeat TTE when extubated  - Euvolemic, would keep net even   - c/w heparin but would target lower PTT goal at this time. Eventual resumption of warfarin   - Will plan to hold aspirin indefinitely  - Continue to monitor LDH daily.

## 2021-06-19 NOTE — PROGRESS NOTE ADULT - ASSESSMENT
imp/rx:  Possible aspiration pneumonitis.  No resistant Gram pos and he's adequately covered on zosyn.  Uncertain if pna or sterile pneumonitis causing fever.    suggest dc vanco in absence of mrsa.  if present it's highly likely to grow.    can stay w zosyn for now.    d/w team at rounds.

## 2021-06-19 NOTE — PROGRESS NOTE ADULT - ASSESSMENT
66 YO M with a history of stage D NICM s/p HM2 on 9/2017 as DT (due to severe PAD) with TV ring, prior COVID-19 infection 4/2020, recurrent syncope post LVAD s/p ILR, and chronic abdominal pain with prior negative workup who was admitted with symptomatic anemia with Hgb 4.5 in setting of INR 8.8 without hemodynamic instability. He was transfused and underwent VCE which showed active bleeding in the mid small bowel but subsequent enteroscopy 6/15 did not reveal any active bleeding. He acutely decompensated after procedure with fever/hypertension, low flow alarms, and pulmonary infiltrate with hypoxia requiring intubation from probable aspiration though TRALI/TACO also on the differential. Reassuringly his cultures have been negative. His LDH is normal and there are no signs of overt LVAD dysfunction on echo though signs of insufficiently unloaded ventricle for which we increased speed on 6/19. Will follow up infectious workup and wean ventilator as tolerates.

## 2021-06-20 NOTE — PROGRESS NOTE ADULT - ASSESSMENT
Assessment and Recommendation:   · Assessment	  Assessment and recommendation :  Recurrent Acute hypoxic respiratory Failure intubated on full ventilatory support   Acute left lower lobe pneumonia  possible Aspiration pneumonia   continue IV Antibiotics with Zosyn   Non ischemic cardiomyopathy continue ACE inhibitor and B-Blockers   Septic shock and cardiogenic shock   Stage D systolic heart failure S/P LVAD HM2   MH2 LVAD  with  TV Annuloplasty  Severe peripheral vascular disease   Anion gap metabolic acidosis  severe hyperglycemia on insulin coverage    On  Amiodarone and IV heparin   Anemia of Acute blood Loss   S/P Small bowel bleeding   S/P blood and FFP transfusion   Chronic kidney disease stage III  Continue NGT feeding   GI prophylaxis  time spend is 35 minutes  Assessment and Recommendation:   · Assessment	  Assessment and recommendation :  Recurrent Acute hypoxic respiratory Failure intubated on full ventilatory support   Acute left lower lobe pneumonia  possible Aspiration pneumonia   continue IV Antibiotics with Zosyn   Non ischemic cardiomyopathy continue ACE inhibitor and B-Blockers   Septic shock and cardiogenic shock   Stage D systolic heart failure S/P LVAD HM2   MH2 LVAD  with  TV Annuloplasty  Severe peripheral vascular disease   Anion gap metabolic acidosis  severe hyperglycemia on insulin coverage    On  Amiodarone and IV heparin   Anemia of Acute blood Loss   S/P Small bowel bleeding   S/P blood and FFP transfusion   Chronic kidney disease stage III  Continue NGT feeding   GI prophylaxis  critical caretime spend is 35 minutes

## 2021-06-20 NOTE — PROGRESS NOTE ADULT - SUBJECTIVE AND OBJECTIVE BOX
RICKY HAMPTON  MRN-[id number]  Patient is a 66y old  Male who presents with a chief complaint of Anemia, Supratherapeutic INR, Dark Stools (2021 19:25)    HPI:  65M PMH ACC/AHA stage D HF due to NICM HM2 LVAD , TV annuloplasty ring 17 as destination therapy due to severe peripheral artery disease with significant stenosis  SIADH, Depression, CKD-3 with hyperkalemia, past E. coli UTIs, driveline drainage (21) and COVID-19 (back in 2020)  He was recently seen in clinic where he complained of abdominal pain and dark stools w constipation back in May. He presents to [facility] ER today weakness and fatigue, moderate and + Black stools for three days, on coumadin secondary to warfarin use in the setting of an LVAD. Patient has required transfusions for GIB in the past. Mostly recently back in 2021 pt had anemia with dark stools. No interventions was done at that time. However Last Endoscopy was done in 2020 (negative). Today labs show patient is anemic with H/H of 4.5/16.3,. INR is 8.84 MAP in the 90s, Temp 35.1. He denies any chest pain, shortness of breath, dizziness, abd pain, nausea or vomiting.       (2021 16:57)      Surgery/Hospital Course:   admit for melena w/ anemia, INR 8.84   6/15 Capsul study (+) for small bowel bleed, balloon endoscopy (old blood in prox ileum); post EGD - septic w/ L opacity, re-intubated for concern for aspiration, TTE (Mod MR, decrease biV w/ interventricular septum boweing towards R)   bronch    Weaning sedation     Today:    ICU Vital Signs Last 24 Hrs  T(C): 38 (2021 04:00), Max: 38.5 (2021 12:00)  T(F): 100.4 (2021 04:00), Max: 101.3 (2021 12:00)  HR: 68 (2021 06:00) (59 - 69)  BP: --  BP(mean): --  ABP: 98/57 (2021 06:00) (90/54 - 132/76)  ABP(mean): 68 (2021 06:00) (63 - 89)  RR: 20 (2021 06:00) (19 - 35)  SpO2: 98% (2021 06:00) (96% - 100%)      Physical Exam:  Gen:  Awake, alert   CNS: non focal 	  Neck: no JVD  RES : clear , no wheezing              CVS: Regular  rhythm. Normal S1/S2  Abd: Soft, non-distended. Bowel sounds present.  Skin: No rash.  Ext:  no edema    ============================I/O===========================   I&O's Detail    2021 07:01  -  2021 07:00  --------------------------------------------------------  IN:    Dexmedetomidine: 470.4 mL    Heparin: 181 mL    Insulin: 14 mL    IV PiggyBack: 612.5 mL    IV PiggyBack: 825 mL    Propofol: 214.7 mL    sodium chloride 0.9%: 240 mL  Total IN: 2557.6 mL    OUT:    Emesis (mL): 300 mL    EPINEPHrine: 0 mL    Norepinephrine: 0 mL    Vasopressin: 0 mL    Voided (mL): 4020 mL  Total OUT: 4320 mL    Total NET: -1762.4 mL      2021 07:01  -  2021 06:53  --------------------------------------------------------  IN:    Dexmedetomidine: 474.8 mL    Enteral Tube Flush: 50 mL    Heparin: 243 mL    IV PiggyBack: 300 mL    IV PiggyBack: 337.5 mL    Miscellaneous Tube Feedin mL    Propofol: 217.2 mL    sodium chloride 0.9%: 180 mL  Total IN: 2422.5 mL    OUT:    Emesis (mL): 0 mL    Insulin: 0 mL    Vasopressin: 0 mL    Voided (mL): 1290 mL  Total OUT: 1290 mL    Total NET: 1132.5 mL        ============================ LABS =========================                        8.0    12.09 )-----------( 145      ( 2021 00:42 )             26.4     06-20    137  |  106  |  14  ----------------------------<  156<H>  3.5   |  19<L>  |  0.77    Ca    8.8      2021 00:42  Phos  1.6     06-20  Mg     2.0     06-20    TPro  6.7  /  Alb  3.4  /  TBili  0.8  /  DBili  x   /  AST  24  /  ALT  15  /  AlkPhos  213<H>  06-20    LIVER FUNCTIONS - ( 2021 00:42 )  Alb: 3.4 g/dL / Pro: 6.7 g/dL / ALK PHOS: 213 U/L / ALT: 15 U/L / AST: 24 U/L / GGT: x           PT/INR - ( 2021 23:16 )   PT: 15.3 sec;   INR: 1.29 ratio         PTT - ( 2021 04:52 )  PTT:45.2 sec  ABG - ( 2021 00:14 )  pH, Arterial: 7.44  pH, Blood: x     /  pCO2: 32    /  pO2: 98    / HCO3: 22    / Base Excess: -1.7  /  SaO2: 98                Urinalysis Basic - ( 2021 22:06 )    Color: Yellow / Appearance: Clear / S.027 / pH: x  Gluc: x / Ketone: Moderate  / Bili: Negative / Urobili: 2 mg/dL   Blood: x / Protein: 30 mg/dL / Nitrite: Negative   Leuk Esterase: Moderate / RBC: 140 /hpf / WBC 6 /HPF   Sq Epi: x / Non Sq Epi: 2 /hpf / Bacteria: Negative      ======================Micro/Rad/Cardio=================  Culture: Reviewed   CXR: Reviewed  Echo: Reviewed  ======================================================  PAST MEDICAL & SURGICAL HISTORY:  CHF (congestive heart failure)    CAD (coronary artery disease)    Depression    Pleural effusion    History of  novel coronavirus disease (COVID-19)  2020    Hemorrhoids    Bleeding hemorrhoids    Peripheral arterial disease    Claudication    BPH with urinary obstruction    ACC/AHA stage D systolic heart failure    Anticoagulation goal of INR 2.0 to 2.5    Falls    Clavicle fracture    CKD (chronic kidney disease), stage III    Iron deficiency anemia    H/O epistaxis    Vertigo    GI bleed    S/P TVR (tricuspid valve repair)    S/P ventricular assist device    S/P endoscopy      ====================ASSESSMENT ==============  Stage D Nonischemic Cardiomyopathy, Status Post HM2 on 2017   Recent driveline infection on 2021   Acute hypoxemic respiratory failure  Aspiration pneumonia  Septic shock  Cardiogenic shock  GI bleed , Status Post Enteroscopy   Anemia, in setting of melena   Chronic Kidney Disease  Thrombocytopenia   Leukocytosis  Coagulopathy   Stress hyperglycemia   Hemodynamic instability   Fevers    Plan:  ====================== NEUROLOGY=====================  Sedated with IV Precedex and IV Propofol drips for ventilator synchrony.   Assess neuro status per protocol when pt is off sedation.  Continue with Tylenol for fevers / analgesia.    acetaminophen    Suspension .. 650 milliGRAM(s) Oral every 6 hours PRN Temp greater or equal to 38C (100.4F)  dexMEDEtomidine Infusion 0.5 MICROgram(s)/kG/Hr (9.81 mL/Hr) IV Continuous <Continuous>  propofol Infusion 20 MICROgram(s)/kG/Min (9.42 mL/Hr) IV Continuous <Continuous>  ==================== RESPIRATORY======================  On full vent support, requiring close monitoring of respiratory rate, breathing pattern, pulse oximetry monitoring, and intermittent blood gas analysis. Continue CPAP trials as tolerated.     Mechanical Ventilation:  Mode: AC/ CMV (Assist Control/ Continuous Mandatory Ventilation)  RR (machine): 20  TV (machine): 500  FiO2: 40  PEEP: 5  ITime: 1  MAP: 10  PIP: 26      ====================CARDIOVASCULAR==================  Stage D NICM, S/P HM2 on 2017; recent driveline infection on 2021; HM2 settings: 9000 rpm, flow 4.9.   TTE 6/15: severe global LV systolic dysfunction with HM2 in place, decreased RV systolic function, interventricular septums bows slightly to right, otherwise grossly similar to prior.   Rate and rhythm control with Amiodarone.   Invasive hemodynamic monitoring, assess perfusion indices.   Monitor continuous telemetry.     aMIOdarone    Tablet 200 milliGRAM(s) Oral daily  ===================HEMATOLOGIC/ONC ===================  Acute Blood Loss Anemia, H/H 8/26.4%. Thromobocytopenia, PLTs 145k  C/w IV Heparin for LVAD with lower PTT goal of 40-50 in setting of recent GI bleed.     heparin  Infusion 400 Unit(s)/Hr (12 mL/Hr) IV Continuous <Continuous>  ===================== RENAL =========================  Continue to monitor I/Os, BUN/Creatinine, and urine output.   Goal net negative fluid balance. Replete lytes PRN. Keep K> 4 and Mg >2.   ==================== GASTROINTESTINAL===================  + Notable bleed in small bowel on capsule study.   Underwent endoscopy on 6/15, notable for old blood in proximal ileum, no acute intervention indicated.   Tolerating TF, Vital AF to goal @ 55cc/hr.  Continue Protonix for stress ulcer prophylaxis.     pantoprazole  Injectable 40 milliGRAM(s) IV Push every 12 hours  sodium chloride 0.9% lock flush 3 milliLiter(s) IV Push every 8 hours  sodium chloride 0.9%. 1000 milliLiter(s) (10 mL/Hr) IV Continuous <Continuous>  =======================    ENDOCRINE  =====================  Stress hyperglycemia, glycemic control with Admelog sliding scale.  Monitor blood glucose levels.     dextrose 50% Injectable 50 milliLiter(s) IV Push every 15 minutes  insulin lispro (ADMELOG) corrective regimen sliding scale   SubCutaneous every 6 hours  ========================INFECTIOUS DISEASE================  Fevers w/ TMAX 101.3F with leukocytosis, WBC downtrending from 12.6 -> 12.09.   Monitor temperature and trend WBC. Continue empiric abx coverage with Zosyn for possible aspiration PNA.     piperacillin/tazobactam IVPB.. 3.375 Gram(s) IV Intermittent every 8 hours          I have spent 35 minutes providing acute care for this critically ill patient     Patient requires continuous monitoring with bedside rhythm monitoring, pulse ox monitoring, and intermittent blood gas analysis. Care plan discussed with ICU care team. Patient remained critical and at risk for life threatening decompensation.     By signing my name below, I, Maryann Cardoso, attest that this documentation has been prepared under the direction and in the presence of Kota Thomas MD   Electronically signed: Cesia Mayo, 21 @ 06:53    I, Kota Thomas, personally performed the services described in this documentation. all medical record entries made by the luisibmel were at my direction and in my presence. I have reviewed the chart and agree that the record reflects my personal performance and is accurate and complete  Electronically signed: Kota Thomas MD        RICKY HAMPTON  MRN-[id number]  Patient is a 66y old  Male who presents with a chief complaint of Anemia, Supratherapeutic INR, Dark Stools (2021 19:25)    HPI:  65M PMH ACC/AHA stage D HF due to NICM HM2 LVAD , TV annuloplasty ring 17 as destination therapy due to severe peripheral artery disease with significant stenosis  SIADH, Depression, CKD-3 with hyperkalemia, past E. coli UTIs, driveline drainage (21) and COVID-19 (back in 2020)  He was recently seen in clinic where he complained of abdominal pain and dark stools w constipation back in May. He presents to [facility] ER today weakness and fatigue, moderate and + Black stools for three days, on coumadin secondary to warfarin use in the setting of an LVAD. Patient has required transfusions for GIB in the past. Mostly recently back in 2021 pt had anemia with dark stools. No interventions was done at that time. However Last Endoscopy was done in 2020 (negative). Today labs show patient is anemic with H/H of 4.5/16.3,. INR is 8.84 MAP in the 90s, Temp 35.1. He denies any chest pain, shortness of breath, dizziness, abd pain, nausea or vomiting.       (2021 16:57)      Surgery/Hospital Course:   admit for melena w/ anemia, INR 8.84   6/15 Capsul study (+) for small bowel bleed, balloon endoscopy (old blood in prox ileum); post EGD - septic w/ L opacity, re-intubated for concern for aspiration, TTE (Mod MR, decrease biV w/ interventricular septum boweing towards R)   bronch     Today:    ICU Vital Signs Last 24 Hrs  T(C): 38 (2021 04:00), Max: 38.5 (2021 12:00)  T(F): 100.4 (2021 04:00), Max: 101.3 (2021 12:00)  HR: 68 (2021 06:00) (59 - 69)  BP: --  BP(mean): --  ABP: 98/57 (2021 06:00) (90/54 - 132/76)  ABP(mean): 68 (2021 06:00) (63 - 89)  RR: 20 (2021 06:00) (19 - 35)  SpO2: 98% (2021 06:00) (96% - 100%)      Physical Exam:  Gen: Sedated, intubated  CNS: Sedated	  Neck: no JVD  RES : + ETT midline. clear , no wheezing              CVS: Regular  rhythm. Normal S1/S2  Abd: Soft, non-distended. Bowel sounds present.  Skin: No rash.  Ext:  no edema    ============================I/O===========================   I&O's Detail    2021 07:01  -  2021 07:00  --------------------------------------------------------  IN:    Dexmedetomidine: 470.4 mL    Heparin: 181 mL    Insulin: 14 mL    IV PiggyBack: 612.5 mL    IV PiggyBack: 825 mL    Propofol: 214.7 mL    sodium chloride 0.9%: 240 mL  Total IN: 2557.6 mL    OUT:    Emesis (mL): 300 mL    EPINEPHrine: 0 mL    Norepinephrine: 0 mL    Vasopressin: 0 mL    Voided (mL): 4020 mL  Total OUT: 4320 mL    Total NET: -1762.4 mL      2021 07:01  -  2021 06:53  --------------------------------------------------------  IN:    Dexmedetomidine: 474.8 mL    Enteral Tube Flush: 50 mL    Heparin: 243 mL    IV PiggyBack: 300 mL    IV PiggyBack: 337.5 mL    Miscellaneous Tube Feedin mL    Propofol: 217.2 mL    sodium chloride 0.9%: 180 mL  Total IN: 2422.5 mL    OUT:    Emesis (mL): 0 mL    Insulin: 0 mL    Vasopressin: 0 mL    Voided (mL): 1290 mL  Total OUT: 1290 mL    Total NET: 1132.5 mL        ============================ LABS =========================                        8.0    12.09 )-----------( 145      ( 2021 00:42 )             26.4     06-20    137  |  106  |  14  ----------------------------<  156<H>  3.5   |  19<L>  |  0.77    Ca    8.8      2021 00:42  Phos  1.6     06-20  Mg     2.0     06-20    TPro  6.7  /  Alb  3.4  /  TBili  0.8  /  DBili  x   /  AST  24  /  ALT  15  /  AlkPhos  213<H>  06-20    LIVER FUNCTIONS - ( 2021 00:42 )  Alb: 3.4 g/dL / Pro: 6.7 g/dL / ALK PHOS: 213 U/L / ALT: 15 U/L / AST: 24 U/L / GGT: x           PT/INR - ( 2021 23:16 )   PT: 15.3 sec;   INR: 1.29 ratio         PTT - ( 2021 04:52 )  PTT:45.2 sec  ABG - ( 2021 00:14 )  pH, Arterial: 7.44  pH, Blood: x     /  pCO2: 32    /  pO2: 98    / HCO3: 22    / Base Excess: -1.7  /  SaO2: 98                Urinalysis Basic - ( 2021 22:06 )    Color: Yellow / Appearance: Clear / S.027 / pH: x  Gluc: x / Ketone: Moderate  / Bili: Negative / Urobili: 2 mg/dL   Blood: x / Protein: 30 mg/dL / Nitrite: Negative   Leuk Esterase: Moderate / RBC: 140 /hpf / WBC 6 /HPF   Sq Epi: x / Non Sq Epi: 2 /hpf / Bacteria: Negative      ======================Micro/Rad/Cardio=================  Culture: Reviewed   CXR: Reviewed  Echo: Reviewed  ======================================================  PAST MEDICAL & SURGICAL HISTORY:  CHF (congestive heart failure)    CAD (coronary artery disease)    Depression    Pleural effusion    History of  novel coronavirus disease (COVID-19)  2020    Hemorrhoids    Bleeding hemorrhoids    Peripheral arterial disease    Claudication    BPH with urinary obstruction    ACC/AHA stage D systolic heart failure    Anticoagulation goal of INR 2.0 to 2.5    Falls    Clavicle fracture    CKD (chronic kidney disease), stage III    Iron deficiency anemia    H/O epistaxis    Vertigo    GI bleed    S/P TVR (tricuspid valve repair)    S/P ventricular assist device    S/P endoscopy      ====================ASSESSMENT ==============  Stage D Nonischemic Cardiomyopathy, Status Post HM2 on 2017   Recent driveline infection on 2021   Acute hypoxemic respiratory failure  Aspiration pneumonia  Septic shock  Cardiogenic shock  GI bleed , Status Post Enteroscopy   Anemia, in setting of melena   Chronic Kidney Disease  Thrombocytopenia   Leukocytosis  Coagulopathy   Stress hyperglycemia   Hemodynamic instability   Fevers    Plan:  ====================== NEUROLOGY=====================  Sedated with IV Precedex and IV Propofol drips for ventilator synchrony.   Assess neuro status per protocol when pt is off sedation.  Continue with Tylenol for fevers / analgesia.    acetaminophen    Suspension .. 650 milliGRAM(s) Oral every 6 hours PRN Temp greater or equal to 38C (100.4F)  dexMEDEtomidine Infusion 0.5 MICROgram(s)/kG/Hr (9.81 mL/Hr) IV Continuous <Continuous>  propofol Infusion 20 MICROgram(s)/kG/Min (9.42 mL/Hr) IV Continuous <Continuous>  ==================== RESPIRATORY======================  On full vent support, requiring close monitoring of respiratory rate, breathing pattern, pulse oximetry monitoring, and intermittent blood gas analysis. Continue CPAP trials as tolerated.     Mechanical Ventilation:  Mode: AC/ CMV (Assist Control/ Continuous Mandatory Ventilation)  RR (machine): 20  TV (machine): 500  FiO2: 40  PEEP: 5  ITime: 1  MAP: 10  PIP: 26      ====================CARDIOVASCULAR==================  Stage D NICM, S/P HM2 on 2017; recent driveline infection on 2021; HM2 settings: 9000 rpm, flow 4.9.   TTE 6/15: severe global LV systolic dysfunction with HM2 in place, decreased RV systolic function, interventricular septums bows slightly to right, otherwise grossly similar to prior.   Rate and rhythm control with Amiodarone.   Invasive hemodynamic monitoring, assess perfusion indices.   Monitor continuous telemetry.     aMIOdarone    Tablet 200 milliGRAM(s) Oral daily  ===================HEMATOLOGIC/ONC ===================  Acute Blood Loss Anemia, H/H .4%. Thromobocytopenia, PLTs 145k  C/w IV Heparin for LVAD with lower PTT goal of 40-50 in setting of recent GI bleed.     heparin  Infusion 400 Unit(s)/Hr (12 mL/Hr) IV Continuous <Continuous>  ===================== RENAL =========================  Continue to monitor I/Os, BUN/Creatinine, and urine output.   Goal net negative fluid balance. Replete lytes PRN. Keep K> 4 and Mg >2.   ==================== GASTROINTESTINAL===================  + Notable bleed in small bowel on capsule study.   Underwent endoscopy on 6/15, notable for old blood in proximal ileum, no acute intervention indicated.   Tolerating TF, Vital AF to goal @ 55cc/hr.  Continue Protonix for stress ulcer prophylaxis.     pantoprazole  Injectable 40 milliGRAM(s) IV Push every 12 hours  sodium chloride 0.9% lock flush 3 milliLiter(s) IV Push every 8 hours  sodium chloride 0.9%. 1000 milliLiter(s) (10 mL/Hr) IV Continuous <Continuous>  =======================    ENDOCRINE  =====================  Stress hyperglycemia, glycemic control with Admelog sliding scale.  Monitor blood glucose levels.     dextrose 50% Injectable 50 milliLiter(s) IV Push every 15 minutes  insulin lispro (ADMELOG) corrective regimen sliding scale   SubCutaneous every 6 hours  ========================INFECTIOUS DISEASE================  Fevers w/ TMAX 101.3F with leukocytosis, WBC downtrending from 12.6 -> 12.09.   Monitor temperature and trend WBC. Continue empiric abx coverage with Zosyn for possible aspiration PNA.     piperacillin/tazobactam IVPB.. 3.375 Gram(s) IV Intermittent every 8 hours          I have spent 35 minutes providing acute care for this critically ill patient     Patient requires continuous monitoring with bedside rhythm monitoring, pulse ox monitoring, and intermittent blood gas analysis. Care plan discussed with ICU care team. Patient remained critical and at risk for life threatening decompensation.     By signing my name below, I, Maryann Cardoso, attest that this documentation has been prepared under the direction and in the presence of Kota Thomas MD   Electronically signed: Cesia Mayo, 21 @ 06:53    I, Kota Thomas, personally performed the services described in this documentation. all medical record entries made by the luisibe were at my direction and in my presence. I have reviewed the chart and agree that the record reflects my personal performance and is accurate and complete  Electronically signed: Kota Thomas MD        RICKY HAMPTON  MRN-[id number]  Patient is a 66y old  Male who presents with a chief complaint of Anemia, Supratherapeutic INR, Dark Stools (2021 19:25)    HPI:  65M PMH ACC/AHA stage D HF due to NICM HM2 LVAD , TV annuloplasty ring 17 as destination therapy due to severe peripheral artery disease with significant stenosis  SIADH, Depression, CKD-3 with hyperkalemia, past E. coli UTIs, driveline drainage (21) and COVID-19 (back in 2020)  He was recently seen in clinic where he complained of abdominal pain and dark stools w constipation back in May. He presents to [facility] ER today weakness and fatigue, moderate and + Black stools for three days, on coumadin secondary to warfarin use in the setting of an LVAD. Patient has required transfusions for GIB in the past. Mostly recently back in 2021 pt had anemia with dark stools. No interventions was done at that time. However Last Endoscopy was done in 2020 (negative). Today labs show patient is anemic with H/H of 4.5/16.3,. INR is 8.84 MAP in the 90s, Temp 35.1. He denies any chest pain, shortness of breath, dizziness, abd pain, nausea or vomiting.       (2021 16:57)      Surgery/Hospital Course:   admit for melena w/ anemia, INR 8.84   6/15 Capsul study (+) for small bowel bleed, balloon endoscopy (old blood in prox ileum); post EGD - septic w/ L opacity, re-intubated for concern for aspiration, TTE (Mod MR, decrease biV w/ interventricular septum boweing towards R)   bronch     Today:  Patient with acute hypoxemic respiratory failure, CPAPed 8 hrs yesterday. Will continue CPAP trials today as tolerated. Tolerating TF, Vital AF @ goal rate of 55cc/hr.    ICU Vital Signs Last 24 Hrs  T(C): 38 (2021 04:00), Max: 38.5 (2021 12:00)  T(F): 100.4 (2021 04:00), Max: 101.3 (2021 12:00)  HR: 68 (2021 06:00) (59 - 69)  BP: --  BP(mean): --  ABP: 98/57 (2021 06:00) (90/54 - 132/76)  ABP(mean): 68 (2021 06:00) (63 - 89)  RR: 20 (2021 06:00) (19 - 35)  SpO2: 98% (2021 06:00) (96% - 100%)      Physical Exam:  Gen: Sedated, intubated  CNS: Sedated	  Neck: no JVD  RES : + ETT midline. clear , no wheezing              CVS: Regular  rhythm. Normal S1/S2  Abd: Soft, non-distended. Bowel sounds present.  Skin: No rash.  Ext:  no edema    ============================I/O===========================   I&O's Detail    2021 07:01  -  2021 07:00  --------------------------------------------------------  IN:    Dexmedetomidine: 470.4 mL    Heparin: 181 mL    Insulin: 14 mL    IV PiggyBack: 612.5 mL    IV PiggyBack: 825 mL    Propofol: 214.7 mL    sodium chloride 0.9%: 240 mL  Total IN: 2557.6 mL    OUT:    Emesis (mL): 300 mL    EPINEPHrine: 0 mL    Norepinephrine: 0 mL    Vasopressin: 0 mL    Voided (mL): 4020 mL  Total OUT: 4320 mL    Total NET: -1762.4 mL      2021 07:01  -  2021 06:53  --------------------------------------------------------  IN:    Dexmedetomidine: 474.8 mL    Enteral Tube Flush: 50 mL    Heparin: 243 mL    IV PiggyBack: 300 mL    IV PiggyBack: 337.5 mL    Miscellaneous Tube Feedin mL    Propofol: 217.2 mL    sodium chloride 0.9%: 180 mL  Total IN: 2422.5 mL    OUT:    Emesis (mL): 0 mL    Insulin: 0 mL    Vasopressin: 0 mL    Voided (mL): 1290 mL  Total OUT: 1290 mL    Total NET: 1132.5 mL        ============================ LABS =========================                        8.0    12.09 )-----------( 145      ( 2021 00:42 )             26.4     06-20    137  |  106  |  14  ----------------------------<  156<H>  3.5   |  19<L>  |  0.77    Ca    8.8      2021 00:42  Phos  1.6     06-20  Mg     2.0     -20    TPro  6.7  /  Alb  3.4  /  TBili  0.8  /  DBili  x   /  AST  24  /  ALT  15  /  AlkPhos  213<H>  06-20    LIVER FUNCTIONS - ( 2021 00:42 )  Alb: 3.4 g/dL / Pro: 6.7 g/dL / ALK PHOS: 213 U/L / ALT: 15 U/L / AST: 24 U/L / GGT: x           PT/INR - ( 2021 23:16 )   PT: 15.3 sec;   INR: 1.29 ratio         PTT - ( 2021 04:52 )  PTT:45.2 sec  ABG - ( 2021 00:14 )  pH, Arterial: 7.44  pH, Blood: x     /  pCO2: 32    /  pO2: 98    / HCO3: 22    / Base Excess: -1.7  /  SaO2: 98                Urinalysis Basic - ( 2021 22:06 )    Color: Yellow / Appearance: Clear / S.027 / pH: x  Gluc: x / Ketone: Moderate  / Bili: Negative / Urobili: 2 mg/dL   Blood: x / Protein: 30 mg/dL / Nitrite: Negative   Leuk Esterase: Moderate / RBC: 140 /hpf / WBC 6 /HPF   Sq Epi: x / Non Sq Epi: 2 /hpf / Bacteria: Negative      ======================Micro/Rad/Cardio=================  Culture: Reviewed   CXR: Reviewed  Echo: Reviewed  ======================================================  PAST MEDICAL & SURGICAL HISTORY:  CHF (congestive heart failure)    CAD (coronary artery disease)    Depression    Pleural effusion    History of  novel coronavirus disease (COVID-19)  2020    Hemorrhoids    Bleeding hemorrhoids    Peripheral arterial disease    Claudication    BPH with urinary obstruction    ACC/AHA stage D systolic heart failure    Anticoagulation goal of INR 2.0 to 2.5    Falls    Clavicle fracture    CKD (chronic kidney disease), stage III    Iron deficiency anemia    H/O epistaxis    Vertigo    GI bleed    S/P TVR (tricuspid valve repair)    S/P ventricular assist device    S/P endoscopy      ====================ASSESSMENT ==============  Stage D Nonischemic Cardiomyopathy, Status Post HM2 on 2017   Recent driveline infection on 2021   Acute hypoxemic respiratory failure  Aspiration pneumonia  Septic shock  Cardiogenic shock  GI bleed , Status Post Enteroscopy   Anemia, in setting of melena   Chronic Kidney Disease  Thrombocytopenia   Leukocytosis  Coagulopathy   Stress hyperglycemia   Hemodynamic instability   Fevers    Plan:  ====================== NEUROLOGY=====================  Sedated with IV Precedex and IV Propofol drips for ventilator synchrony.   Assess neuro status per protocol when pt is off sedation.  Continue with Tylenol for fevers / analgesia.    acetaminophen    Suspension .. 650 milliGRAM(s) Oral every 6 hours PRN Temp greater or equal to 38C (100.4F)  dexMEDEtomidine Infusion 0.5 MICROgram(s)/kG/Hr (9.81 mL/Hr) IV Continuous <Continuous>  propofol Infusion 20 MICROgram(s)/kG/Min (9.42 mL/Hr) IV Continuous <Continuous>  ==================== RESPIRATORY======================  On full vent support, requiring close monitoring of respiratory rate, breathing pattern, pulse oximetry monitoring, and intermittent blood gas analysis.   CPAPed 8 hrs yesterday, will continue CPAP trials today as tolerated.     Mechanical Ventilation:  Mode: AC/ CMV (Assist Control/ Continuous Mandatory Ventilation)  RR (machine): 20  TV (machine): 500  FiO2: 40  PEEP: 5  ITime: 1  MAP: 10  PIP: 26      ====================CARDIOVASCULAR==================  Stage D NICM, S/P HM2 on 2017; recent driveline infection on 2021; HM2 settings: 9000 rpm, flow 4.9.   TTE 6/15: severe global LV systolic dysfunction with HM2 in place, decreased RV systolic function, interventricular septums bows slightly to right, otherwise grossly similar to prior.   Rate control with Amiodarone.   Invasive hemodynamic monitoring, assess perfusion indices.   Monitor continuous telemetry.     aMIOdarone    Tablet 200 milliGRAM(s) Oral daily  ===================HEMATOLOGIC/ONC ===================  Acute Blood Loss Anemia, H/H 8/26.4%. Thromobocytopenia, PLTs 145k  C/w IV Heparin for LVAD with lower PTT goal of 40-50 in setting of recent GI bleed.     heparin  Infusion 400 Unit(s)/Hr (12 mL/Hr) IV Continuous <Continuous>  ===================== RENAL =========================  Continue to monitor I/Os, BUN/Creatinine, and urine output.   Goal net negative fluid balance. Replete lytes PRN. Keep K> 4 and Mg >2.   ==================== GASTROINTESTINAL===================  + Notable bleed in small bowel on capsule study.   Underwent endoscopy on 6/15, notable for old blood in proximal ileum, no acute intervention indicated.   Tolerating TF, Vital AF @ goal rate of 55cc/hr.  Continue Protonix for stress ulcer prophylaxis.     pantoprazole  Injectable 40 milliGRAM(s) IV Push every 12 hours  sodium chloride 0.9% lock flush 3 milliLiter(s) IV Push every 8 hours  sodium chloride 0.9%. 1000 milliLiter(s) (10 mL/Hr) IV Continuous <Continuous>  =======================    ENDOCRINE  =====================  Stress hyperglycemia, glycemic control with Admelog sliding scale.  Monitor blood glucose levels.     dextrose 50% Injectable 50 milliLiter(s) IV Push every 15 minutes  insulin lispro (ADMELOG) corrective regimen sliding scale   SubCutaneous every 6 hours  ========================INFECTIOUS DISEASE================  Fevers w/ TMAX 101.3F with leukocytosis, WBC downtrending from 12.6 -> 12.09.   Monitor temperature and trend WBC. Continue empiric abx coverage with Zosyn for possible aspiration PNA.     piperacillin/tazobactam IVPB.. 3.375 Gram(s) IV Intermittent every 8 hours          I have spent 35 minutes providing acute care for this critically ill patient     Patient requires continuous monitoring with bedside rhythm monitoring, pulse ox monitoring, and intermittent blood gas analysis. Care plan discussed with ICU care team. Patient remained critical and at risk for life threatening decompensation.     By signing my name below, I, Maryann Cardoso, attest that this documentation has been prepared under the direction and in the presence of Kota Thomas MD   Electronically signed: Cesia Mayo, 21 @ 06:53    I, Kota Thomas, personally performed the services described in this documentation. all medical record entries made by the luisibmel were at my direction and in my presence. I have reviewed the chart and agree that the record reflects my personal performance and is accurate and complete  Electronically signed: Kota Thomas MD        RICKY HAMPTON  MRN-[id number]  Patient is a 66y old  Male who presents with a chief complaint of Anemia, Supratherapeutic INR, Dark Stools (2021 19:25)    HPI:  65M PMH ACC/AHA stage D HF due to NICM HM2 LVAD , TV annuloplasty ring 17 as destination therapy due to severe peripheral artery disease with significant stenosis  SIADH, Depression, CKD-3 with hyperkalemia, past E. coli UTIs, driveline drainage (21) and COVID-19 (back in 2020)  He was recently seen in clinic where he complained of abdominal pain and dark stools w constipation back in May. He presents to [facility] ER today weakness and fatigue, moderate and + Black stools for three days, on coumadin secondary to warfarin use in the setting of an LVAD. Patient has required transfusions for GIB in the past. Mostly recently back in 2021 pt had anemia with dark stools. No interventions was done at that time. However Last Endoscopy was done in 2020 (negative). Today labs show patient is anemic with H/H of 4.5/16.3,. INR is 8.84 MAP in the 90s, Temp 35.1. He denies any chest pain, shortness of breath, dizziness, abd pain, nausea or vomiting.       (2021 16:57)      Surgery/Hospital Course:   admit for melena w/ anemia, INR 8.84   6/15 Capsul study (+) for small bowel bleed, balloon endoscopy (old blood in prox ileum); post EGD - septic w/ L opacity, re-intubated for concern for aspiration, TTE (Mod MR, decrease biV w/ interventricular septum boweing towards R)   bronch     Today:  Patient with acute hypoxemic respiratory failure, CPAPed 8 hrs yesterday. Will continue CPAP trials today as tolerated. Tolerating TF, Vital AF @ goal rate of 55cc/hr.    ICU Vital Signs Last 24 Hrs  T(C): 38 (2021 04:00), Max: 38.5 (2021 12:00)  T(F): 100.4 (2021 04:00), Max: 101.3 (2021 12:00)  HR: 68 (2021 06:00) (59 - 69)  BP: --  BP(mean): --  ABP: 98/57 (2021 06:00) (90/54 - 132/76)  ABP(mean): 68 (2021 06:00) (63 - 89)  RR: 20 (2021 06:00) (19 - 35)  SpO2: 98% (2021 06:00) (96% - 100%)      Physical Exam:  Gen: Sedated, intubated  CNS: Sedated	  Neck: no JVD  RES : + ETT midline. clear , no wheezing              CVS: Regular  rhythm. Normal S1/S2  Abd: Soft, non-distended. Bowel sounds present.  Skin: No rash.  Ext:  no edema    ============================I/O===========================   I&O's Detail    2021 07:01  -  2021 07:00  --------------------------------------------------------  IN:    Dexmedetomidine: 470.4 mL    Heparin: 181 mL    Insulin: 14 mL    IV PiggyBack: 612.5 mL    IV PiggyBack: 825 mL    Propofol: 214.7 mL    sodium chloride 0.9%: 240 mL  Total IN: 2557.6 mL    OUT:    Emesis (mL): 300 mL    EPINEPHrine: 0 mL    Norepinephrine: 0 mL    Vasopressin: 0 mL    Voided (mL): 4020 mL  Total OUT: 4320 mL    Total NET: -1762.4 mL      2021 07:01  -  2021 06:53  --------------------------------------------------------  IN:    Dexmedetomidine: 474.8 mL    Enteral Tube Flush: 50 mL    Heparin: 243 mL    IV PiggyBack: 300 mL    IV PiggyBack: 337.5 mL    Miscellaneous Tube Feedin mL    Propofol: 217.2 mL    sodium chloride 0.9%: 180 mL  Total IN: 2422.5 mL    OUT:    Emesis (mL): 0 mL    Insulin: 0 mL    Vasopressin: 0 mL    Voided (mL): 1290 mL  Total OUT: 1290 mL    Total NET: 1132.5 mL        ============================ LABS =========================                        8.0    12.09 )-----------( 145      ( 2021 00:42 )             26.4     06-20    137  |  106  |  14  ----------------------------<  156<H>  3.5   |  19<L>  |  0.77    Ca    8.8      2021 00:42  Phos  1.6     06-20  Mg     2.0     -20    TPro  6.7  /  Alb  3.4  /  TBili  0.8  /  DBili  x   /  AST  24  /  ALT  15  /  AlkPhos  213<H>  06-20    LIVER FUNCTIONS - ( 2021 00:42 )  Alb: 3.4 g/dL / Pro: 6.7 g/dL / ALK PHOS: 213 U/L / ALT: 15 U/L / AST: 24 U/L / GGT: x           PT/INR - ( 2021 23:16 )   PT: 15.3 sec;   INR: 1.29 ratio         PTT - ( 2021 04:52 )  PTT:45.2 sec  ABG - ( 2021 00:14 )  pH, Arterial: 7.44  pH, Blood: x     /  pCO2: 32    /  pO2: 98    / HCO3: 22    / Base Excess: -1.7  /  SaO2: 98                Urinalysis Basic - ( 2021 22:06 )    Color: Yellow / Appearance: Clear / S.027 / pH: x  Gluc: x / Ketone: Moderate  / Bili: Negative / Urobili: 2 mg/dL   Blood: x / Protein: 30 mg/dL / Nitrite: Negative   Leuk Esterase: Moderate / RBC: 140 /hpf / WBC 6 /HPF   Sq Epi: x / Non Sq Epi: 2 /hpf / Bacteria: Negative      ======================Micro/Rad/Cardio=================  Culture: Reviewed   CXR: Reviewed  Echo: Reviewed  ======================================================  PAST MEDICAL & SURGICAL HISTORY:  CHF (congestive heart failure)    CAD (coronary artery disease)    Depression    Pleural effusion    History of  novel coronavirus disease (COVID-19)  2020    Hemorrhoids    Bleeding hemorrhoids    Peripheral arterial disease    Claudication    BPH with urinary obstruction    ACC/AHA stage D systolic heart failure    Anticoagulation goal of INR 2.0 to 2.5    Falls    Clavicle fracture    CKD (chronic kidney disease), stage III    Iron deficiency anemia    H/O epistaxis    Vertigo    GI bleed    S/P TVR (tricuspid valve repair)    S/P ventricular assist device    S/P endoscopy      ====================ASSESSMENT ==============  Stage D Nonischemic Cardiomyopathy, Status Post HM2 on 2017   Recent driveline infection on 2021   Acute hypoxemic respiratory failure  Aspiration pneumonia  Septic shock  Cardiogenic shock  GI bleed , Status Post Enteroscopy   Anemia, in setting of melena   Chronic Kidney Disease  Thrombocytopenia   Leukocytosis  Coagulopathy   Stress hyperglycemia   Hemodynamic instability   Fevers    Plan:  ====================== NEUROLOGY=====================  Sedated with IV Precedex and IV Propofol drips for ventilator synchrony.   Assess neuro status per protocol when pt is off sedation.  Continue with Tylenol for fevers / analgesia.    acetaminophen    Suspension .. 650 milliGRAM(s) Oral every 6 hours PRN Temp greater or equal to 38C (100.4F)  dexMEDEtomidine Infusion 0.5 MICROgram(s)/kG/Hr (9.81 mL/Hr) IV Continuous <Continuous>  propofol Infusion 20 MICROgram(s)/kG/Min (9.42 mL/Hr) IV Continuous <Continuous>  ==================== RESPIRATORY======================  On full vent support, requiring close monitoring of respiratory rate, breathing pattern, pulse oximetry monitoring, and intermittent blood gas analysis.   CPAPed 8 hrs yesterday, will continue CPAP trials today as tolerated.     Mechanical Ventilation:  Mode: AC/ CMV (Assist Control/ Continuous Mandatory Ventilation)  RR (machine): 20  TV (machine): 500  FiO2: 40  PEEP: 5  ITime: 1  MAP: 10  PIP: 26      ====================CARDIOVASCULAR==================  Stage D NICM, S/P HM2 on 2017; recent driveline infection on 2021; HM2 settings: 9000 rpm, flow 4.9.   TTE 6/15: severe global LV systolic dysfunction with HM2 in place, decreased RV systolic function, interventricular septums bows slightly to right, otherwise grossly similar to prior.   Rate control with Amiodarone.   Invasive hemodynamic monitoring, assess perfusion indices.   Monitor continuous telemetry.     aMIOdarone    Tablet 200 milliGRAM(s) Oral daily  ===================HEMATOLOGIC/ONC ===================  Acute Blood Loss Anemia, H/H 8/26.4%. Thromobocytopenia, PLTs 145k  C/w IV Heparin for LVAD with lower PTT goal of 40-50 in setting of recent GI bleed.     heparin  Infusion 400 Unit(s)/Hr (12 mL/Hr) IV Continuous <Continuous>  ===================== RENAL =========================  Continue to monitor I/Os, BUN/Creatinine, and urine output.   Goal net negative fluid balance. Replete lytes PRN. Keep K> 4 and Mg >2.   ==================== GASTROINTESTINAL===================  + Notable bleed in small bowel on capsule study.   Underwent endoscopy on 6/15, notable for old blood in proximal ileum, no acute intervention indicated.   Tolerating TF, Vital AF @ goal rate of 55cc/hr.  Continue Protonix for stress ulcer prophylaxis.     pantoprazole  Injectable 40 milliGRAM(s) IV Push every 12 hours  sodium chloride 0.9% lock flush 3 milliLiter(s) IV Push every 8 hours  sodium chloride 0.9%. 1000 milliLiter(s) (10 mL/Hr) IV Continuous <Continuous>  =======================    ENDOCRINE  =====================  Stress hyperglycemia, glycemic control with Admelog sliding scale.  Monitor blood glucose levels.     dextrose 50% Injectable 50 milliLiter(s) IV Push every 15 minutes  insulin lispro (ADMELOG) corrective regimen sliding scale   SubCutaneous every 6 hours  ========================INFECTIOUS DISEASE================  Fevers w/ TMAX 101.3F with leukocytosis, WBC downtrending from 12.6 -> 12.09.   Monitor temperature and trend WBC. Continue empiric abx coverage with Zosyn for possible aspiration PNA.     piperacillin/tazobactam IVPB.. 3.375 Gram(s) IV Intermittent every 8 hours      I have spent 75 minutes providing acute care for this critically ill patient     Patient requires continuous monitoring with bedside rhythm monitoring, pulse ox monitoring, and intermittent blood gas analysis. Care plan discussed with ICU care team. Patient remained critical and at risk for life threatening decompensation.     By signing my name below, I, Maryann Cardoso, attest that this documentation has been prepared under the direction and in the presence of Kota Thomas MD   Electronically signed: Cesia Mayo, 21 @ 06:53    I, Kota Thomas, personally performed the services described in this documentation. all medical record entries made by the luisibmel were at my direction and in my presence. I have reviewed the chart and agree that the record reflects my personal performance and is accurate and complete  Electronically signed: Kota Thomas MD        RICKY HAMPTON  MRN-[id number]  Patient is a 66y old  Male who presents with a chief complaint of Anemia, Supratherapeutic INR, Dark Stools (2021 19:25)    HPI:  65M PMH ACC/AHA stage D HF due to NICM HM2 LVAD , TV annuloplasty ring 17 as destination therapy due to severe peripheral artery disease with significant stenosis  SIADH, Depression, CKD-3 with hyperkalemia, past E. coli UTIs, driveline drainage (21) and COVID-19 (back in 2020)  He was recently seen in clinic where he complained of abdominal pain and dark stools w constipation back in May. He presents to [facility] ER today weakness and fatigue, moderate and + Black stools for three days, on coumadin secondary to warfarin use in the setting of an LVAD. Patient has required transfusions for GIB in the past. Mostly recently back in 2021 pt had anemia with dark stools. No interventions was done at that time. However Last Endoscopy was done in 2020 (negative). Today labs show patient is anemic with H/H of 4.5/16.3,. INR is 8.84 MAP in the 90s, Temp 35.1. He denies any chest pain, shortness of breath, dizziness, abd pain, nausea or vomiting.       (2021 16:57)      Surgery/Hospital Course:   admit for melena w/ anemia, INR 8.84   6/15 Capsul study (+) for small bowel bleed, balloon endoscopy (old blood in prox ileum); post EGD - septic w/ L opacity, re-intubated for concern for aspiration, TTE (Mod MR, decrease biV w/ interventricular septum boweing towards R)   bronch     Today:  Patient with acute hypoxemic respiratory failure, CPAPed 8 hrs yesterday. Will continue CPAP trials today as tolerated. Tolerating TF, Vital AF @ goal rate of 55cc/hr.    ICU Vital Signs Last 24 Hrs  T(C): 38 (2021 04:00), Max: 38.5 (2021 12:00)  T(F): 100.4 (2021 04:00), Max: 101.3 (2021 12:00)  HR: 68 (2021 06:00) (59 - 69)  BP: --  BP(mean): --  ABP: 98/57 (2021 06:00) (90/54 - 132/76)  ABP(mean): 68 (2021 06:00) (63 - 89)  RR: 20 (2021 06:00) (19 - 35)  SpO2: 98% (2021 06:00) (96% - 100%)      Physical Exam:  Gen: Sedated, intubated  CNS: Sedated	  Neck: no JVD  RES : + ETT midline. clear , no wheezing              CVS: Regular  rhythm. Normal S1/S2  Abd: Soft, non-distended. Bowel sounds present.  Skin: No rash.  Ext:  no edema    ============================I/O===========================   I&O's Detail    2021 07:01  -  2021 07:00  --------------------------------------------------------  IN:    Dexmedetomidine: 470.4 mL    Heparin: 181 mL    Insulin: 14 mL    IV PiggyBack: 612.5 mL    IV PiggyBack: 825 mL    Propofol: 214.7 mL    sodium chloride 0.9%: 240 mL  Total IN: 2557.6 mL    OUT:    Emesis (mL): 300 mL    EPINEPHrine: 0 mL    Norepinephrine: 0 mL    Vasopressin: 0 mL    Voided (mL): 4020 mL  Total OUT: 4320 mL    Total NET: -1762.4 mL      2021 07:01  -  2021 06:53  --------------------------------------------------------  IN:    Dexmedetomidine: 474.8 mL    Enteral Tube Flush: 50 mL    Heparin: 243 mL    IV PiggyBack: 300 mL    IV PiggyBack: 337.5 mL    Miscellaneous Tube Feedin mL    Propofol: 217.2 mL    sodium chloride 0.9%: 180 mL  Total IN: 2422.5 mL    OUT:    Emesis (mL): 0 mL    Insulin: 0 mL    Vasopressin: 0 mL    Voided (mL): 1290 mL  Total OUT: 1290 mL    Total NET: 1132.5 mL        ============================ LABS =========================                        8.0    12.09 )-----------( 145      ( 2021 00:42 )             26.4     06-20    137  |  106  |  14  ----------------------------<  156<H>  3.5   |  19<L>  |  0.77    Ca    8.8      2021 00:42  Phos  1.6     06-20  Mg     2.0     -20    TPro  6.7  /  Alb  3.4  /  TBili  0.8  /  DBili  x   /  AST  24  /  ALT  15  /  AlkPhos  213<H>  06-20    LIVER FUNCTIONS - ( 2021 00:42 )  Alb: 3.4 g/dL / Pro: 6.7 g/dL / ALK PHOS: 213 U/L / ALT: 15 U/L / AST: 24 U/L / GGT: x           PT/INR - ( 2021 23:16 )   PT: 15.3 sec;   INR: 1.29 ratio         PTT - ( 2021 04:52 )  PTT:45.2 sec  ABG - ( 2021 00:14 )  pH, Arterial: 7.44  pH, Blood: x     /  pCO2: 32    /  pO2: 98    / HCO3: 22    / Base Excess: -1.7  /  SaO2: 98                Urinalysis Basic - ( 2021 22:06 )    Color: Yellow / Appearance: Clear / S.027 / pH: x  Gluc: x / Ketone: Moderate  / Bili: Negative / Urobili: 2 mg/dL   Blood: x / Protein: 30 mg/dL / Nitrite: Negative   Leuk Esterase: Moderate / RBC: 140 /hpf / WBC 6 /HPF   Sq Epi: x / Non Sq Epi: 2 /hpf / Bacteria: Negative      ======================Micro/Rad/Cardio=================  Culture: Reviewed   CXR: Reviewed  Echo: Reviewed  ======================================================  PAST MEDICAL & SURGICAL HISTORY:  CHF (congestive heart failure)    CAD (coronary artery disease)    Depression    Pleural effusion    History of  novel coronavirus disease (COVID-19)  2020    Hemorrhoids    Bleeding hemorrhoids    Peripheral arterial disease    Claudication    BPH with urinary obstruction    ACC/AHA stage D systolic heart failure    Anticoagulation goal of INR 2.0 to 2.5    Falls    Clavicle fracture    CKD (chronic kidney disease), stage III    Iron deficiency anemia    H/O epistaxis    Vertigo    GI bleed    S/P TVR (tricuspid valve repair)    S/P ventricular assist device    S/P endoscopy      ====================ASSESSMENT ==============  Stage D Nonischemic Cardiomyopathy, Status Post HM2 on 2017   Recent driveline infection on 2021   Acute hypoxemic respiratory failure  Aspiration pneumonia  Septic shock  Cardiogenic shock  GI bleed , Status Post Enteroscopy   Anemia, in setting of melena   Chronic Kidney Disease  Thrombocytopenia   Leukocytosis  Coagulopathy   Stress hyperglycemia   Hemodynamic instability   Fevers    Plan:  ====================== NEUROLOGY=====================  Sedated with IV Precedex and IV Propofol drips for ventilator synchrony.   Assess neuro status per protocol when pt is off sedation.  Continue with Tylenol for fevers / analgesia.    acetaminophen    Suspension .. 650 milliGRAM(s) Oral every 6 hours PRN Temp greater or equal to 38C (100.4F)  dexMEDEtomidine Infusion 0.5 MICROgram(s)/kG/Hr (9.81 mL/Hr) IV Continuous <Continuous>  propofol Infusion 20 MICROgram(s)/kG/Min (9.42 mL/Hr) IV Continuous <Continuous>  ==================== RESPIRATORY======================  On full vent support, requiring close monitoring of respiratory rate, breathing pattern, pulse oximetry monitoring, and intermittent blood gas analysis.   CPAPed 8 hrs yesterday, will continue CPAP trials today as tolerated.     Mechanical Ventilation:  Mode: AC/ CMV (Assist Control/ Continuous Mandatory Ventilation)  RR (machine): 20  TV (machine): 500  FiO2: 40  PEEP: 5  ITime: 1  MAP: 10  PIP: 26      ====================CARDIOVASCULAR==================  Stage D NICM, S/P HM2 on 2017; recent driveline infection on 2021; HM2 settings: 9000 rpm, flow 4.9.   TTE 6/15: severe global LV systolic dysfunction with HM2 in place, decreased RV systolic function, interventricular septums bows slightly to right, otherwise grossly similar to prior.   Rate control with Amiodarone.   Invasive hemodynamic monitoring, assess perfusion indices.   Monitor continuous telemetry.     aMIOdarone    Tablet 200 milliGRAM(s) Oral daily  ===================HEMATOLOGIC/ONC ===================  Acute Blood Loss Anemia, H/H 8/26.4%. Thromobocytopenia, PLTs 145k  C/w IV Heparin for LVAD with lower PTT goal of 40-50 in setting of recent GI bleed.     heparin  Infusion 400 Unit(s)/Hr (12 mL/Hr) IV Continuous <Continuous>  ===================== RENAL =========================  Continue to monitor I/Os, BUN/Creatinine, and urine output.   Goal net negative fluid balance. Replete lytes PRN. Keep K> 4 and Mg >2.   ==================== GASTROINTESTINAL===================  + Notable bleed in small bowel on capsule study.   Underwent endoscopy on 6/15, notable for old blood in proximal ileum, no acute intervention indicated.   Tolerating TF, Vital AF @ goal rate of 55cc/hr.  Continue Protonix for stress ulcer prophylaxis.     pantoprazole  Injectable 40 milliGRAM(s) IV Push every 12 hours  sodium chloride 0.9% lock flush 3 milliLiter(s) IV Push every 8 hours  sodium chloride 0.9%. 1000 milliLiter(s) (10 mL/Hr) IV Continuous <Continuous>  =======================    ENDOCRINE  =====================  Stress hyperglycemia, glycemic control with Admelog sliding scale.  Monitor blood glucose levels.     dextrose 50% Injectable 50 milliLiter(s) IV Push every 15 minutes  insulin lispro (ADMELOG) corrective regimen sliding scale   SubCutaneous every 6 hours  ========================INFECTIOUS DISEASE================  Fevers w/ TMAX 101.3F with leukocytosis, WBC downtrending from 12.6 -> 12.09.   Monitor temperature and trend WBC. Continue empiric abx coverage with Zosyn for possible aspiration PNA.     piperacillin/tazobactam IVPB.. 3.375 Gram(s) IV Intermittent every 8 hours      I have spent 30 minutes providing acute care for this critically ill patient     Patient requires continuous monitoring with bedside rhythm monitoring, pulse ox monitoring, and intermittent blood gas analysis. Care plan discussed with ICU care team. Patient remained critical and at risk for life threatening decompensation.     By signing my name below, I, Maryann Cardoso, attest that this documentation has been prepared under the direction and in the presence of Kota Thomas MD   Electronically signed: Cesia Mayo, 21 @ 06:53    I, Kota Thomas, personally performed the services described in this documentation. all medical record entries made by the luisibmel were at my direction and in my presence. I have reviewed the chart and agree that the record reflects my personal performance and is accurate and complete  Electronically signed: Kota Thomas MD

## 2021-06-20 NOTE — PROGRESS NOTE ADULT - SUBJECTIVE AND OBJECTIVE BOX
RICKY JOINT  MRN#: [id number]  Subjective:  Pulmonary progress  : recurrent Acute hypoxic respiratory Failure ,aspiration pneumonia, NICM  , chart reviewed and H/O obtained radiological and Laboratory study reviewed patient Examined     65M PMH ACC/AHA stage D HF due to NICM HM2 LVAD , TV annuloplasty ring 17 as destination therapy due to severe peripheral artery disease with significant stenosis  SIADH, Depression, CKD-3 with hyperkalemia, past E. coli UTIs, driveline drainage (21) and COVID-19 (back in 2020)  He was recently seen in clinic where he complained of abdominal pain and dark stools w constipation back in May. He presents to [facility] ER today weakness and fatigue, moderate and + Black stools for three days, on coumadin secondary to warfarin use in the setting of an LVAD. Patient has required transfusions for GIB in the past. Mostly recently back in 2021 pt had anemia with dark stools. No interventions was done at that time. However Last Endoscopy was done in 2020 (negative). Today labs show patient is anemic with H/H of 4.5/16.3,. INR is 8.84 MAP in the 90s, Temp 35.1. He denies any chest pain, shortness of breath, dizziness, abd pain, nausea or vomiting. found to have  rectal bleeding underwent endoscopy ,old blood in the proximal ileum ,  develop sepsis with LL opacity given Antibiotics , Extubated , reintubated , Bronchoscopy on Zosyn for LL pneumonia  and Amiodarone S/P TV Annuloplasty , patient remain intubated on full ventilatory support .S/P multiple units of blood transfusion , remain on full ventilatory support on Precedex and propofol      (2021 16:57)    PAST MEDICAL & SURGICAL HISTORY:  CHF (congestive heart failure)    CAD (coronary artery disease)  Depression    Pleural effusion    History of 2019 novel coronavirus disease (COVID-19)  2020    Hemorrhoids    Bleeding hemorrhoids    Peripheral arterial disease    Claudication    BPH with urinary obstruction    ACC/AHA stage D systolic heart failure  Anticoagulation goal of INR 2.0 to 2.5    Falls    Clavicle fracture    CKD (chronic kidney disease), stage III    Iron deficiency anemia    H/O epistaxis    Vertigo    GI bleed    S/P TVR (tricuspid valve repair)    S/P ventricular assist device    S/P endoscopy    OBJECTIVE:  ICU Vital Signs Last 24 Hrs  T(C): 38.2 (2021 10:00), Max: 38.5 (2021 12:00)  T(F): 100.8 (2021 10:00), Max: 101.3 (2021 12:00)  HR: 65 (2021 10:00) (61 - 69)  BP: --  BP(mean): --  ABP: 105/67 (2021 10:00) (90/54 - 113/64)  ABP(mean): 77 (2021 10:00) (63 - 77)  RR: 20 (2021 10:00) (19 - 35)  SpO2: 99% (2021 10:00) (96% - 100%)       @ 07:  -   @ 07:00  --------------------------------------------------------  IN: 2693.9 mL / OUT: 1415 mL / NET: 1278.9 mL     @ 07:01  -   @ 10:49  --------------------------------------------------------  IN: 420.8 mL / OUT: 115 mL / NET: 305.8 mL  PHYSICAL EXAM:Daily   Elderly male intubated sedated  on full ventilatory support /40%20/5cm PEEP  0n vasopressin   Daily Weight in k.4 (2021 00:00)  HEENT:     + NCAT  + EOMI  - Conjuctival edema   - Icterus   - Thrush   - ETT  + NGT/OGT  Neck:         + FROM   RT IJ  JVD     - Nodes     - Masses    + Mid-line trachea   - Tracheostomy  Chest: normal findings  Lungs:          + CTA   + Rhonchi    - Rales    - Wheezing     - Decreased BS   - Dullness R L  Cardiac:       + S1 + S2    + RRR   - Irregular   - S3  - S4    - Murmurs   - Rub   - Hamman’s sign   Abdomen:    + BS     + Soft    + Non-tender     - Distended    - Organomegaly  - PEG  Extremities:   - Cyanosis U/L   - Clubbing  U/L  + LE/UE Edema   + Capillary refill    + Pulses   Neuro:        - Awake   -  Alert   - Confused   - Lethargic   + Sedated  + Generalized Weakness  Skin:        - Rashes    - Erythema   + Normal incisions   + IV sites intact          HOSPITAL MEDICATIONS: All mediciations reviewed and analyzed  MEDICATIONS  (STANDING):  aMIOdarone    Tablet 200 milliGRAM(s) Oral daily  chlorhexidine 0.12% Liquid 15 milliLiter(s) Oral Mucosa every 12 hours  chlorhexidine 2% Cloths 1 Application(s) Topical <User Schedule>  dexMEDEtomidine Infusion 0.5 MICROgram(s)/kG/Hr (9.81 mL/Hr) IV Continuous <Continuous>  dextrose 50% Injectable 50 milliLiter(s) IV Push every 15 minutes  heparin  Infusion 400 Unit(s)/Hr (12.5 mL/Hr) IV Continuous <Continuous>  HYDROmorphone  Injectable 0.5 milliGRAM(s) IV Push once  insulin lispro (ADMELOG) corrective regimen sliding scale   SubCutaneous every 6 hours  pantoprazole  Injectable 40 milliGRAM(s) IV Push every 12 hours  piperacillin/tazobactam IVPB.. 3.375 Gram(s) IV Intermittent every 8 hours  propofol Infusion 20 MICROgram(s)/kG/Min (9.42 mL/Hr) IV Continuous <Continuous>  sodium chloride 0.9% lock flush 3 milliLiter(s) IV Push every 8 hours  sodium chloride 0.9%. 1000 milliLiter(s) (10 mL/Hr) IV Continuous <Continuous>    MEDICATIONS  (PRN):  acetaminophen    Suspension .. 650 milliGRAM(s) Oral every 6 hours PRN Temp greater or equal to 38C (100.4F)    LABS: All Lab data reviewed and analyzed                        8.0    12. )-----------( 145      ( 2021 00:42 )             26.4    06-20    137  |  106  |  14  ----------------------------<  156<H>  3.5   |  19<L>  |  0.77    Ca    8.8      2021 00:42  Phos  1.6     06-20  Mg     2.0     06-20    TPro  6.7  /  Alb  3.4  /  TBili  0.8  /  DBili  x   /  AST  24  /  ALT  15  /  AlkPhos  213<H>  06-20    PT/INR - ( 2021 23:16 )   PT: 15.3 sec;   INR: 1.29 ratio         PTT - ( 2021 04:52 )  PTT:45.2 sec LIVER FUNCTIONS - ( 2021 00:42 )  Alb: 3.4 g/dL / Pro: 6.7 g/dL / ALK PHOS: 213 U/L / ALT: 15 U/L / AST: 24 U/L / GGT: x           RADIOLOGY: - Reviewed and analyzed LLL  pneumonia , LVAD HM2

## 2021-06-20 NOTE — PROCEDURE NOTE - NSPOSTPRCRAD_GEN_A_CORE
central line located in the superior vena cava/line adjusted to depth of insertion/no pneumothorax/post-procedure radiography performed

## 2021-06-21 NOTE — PROGRESS NOTE ADULT - SUBJECTIVE AND OBJECTIVE BOX
RICKY HAMPTON  MRN-[id number]  Patient is a 66y old  Male who presents with a chief complaint of Anemia, Supratherapeutic INR, Dark Stools (2021 10:49)    HPI:  65M PMH ACC/AHA stage D HF due to NICM HM2 LVAD , TV annuloplasty ring 17 as destination therapy due to severe peripheral artery disease with significant stenosis  SIADH, Depression, CKD-3 with hyperkalemia, past E. coli UTIs, driveline drainage (21) and COVID-19 (back in 2020)  He was recently seen in clinic where he complained of abdominal pain and dark stools w constipation back in May. He presents to [facility] ER today weakness and fatigue, moderate and + Black stools for three days, on coumadin secondary to warfarin use in the setting of an LVAD. Patient has required transfusions for GIB in the past. Mostly recently back in 2021 pt had anemia with dark stools. No interventions was done at that time. However Last Endoscopy was done in 2020 (negative). Today labs show patient is anemic with H/H of 4.5/16.3,. INR is 8.84 MAP in the 90s, Temp 35.1. He denies any chest pain, shortness of breath, dizziness, abd pain, nausea or vomiting.       (2021 16:57)      Surgery/Hospital Course:   admit for melena w/ anemia, INR 8.84   6/15 Capsul study (+) for small bowel bleed, balloon endoscopy (old blood in prox ileum); post EGD - septic w/ L opacity, re-intubated for concern for aspiration, TTE (Mod MR, decrease biV w/ interventricular septum boweing towards R)   bronch     Today:  No acute events     ICU Vital Signs Last 24 Hrs  T(C): 37.1 (2021 04:00), Max: 38.4 (2021 16:00)  T(F): 98.8 (2021 04:00), Max: 101.1 (2021 16:00)  HR: 68 (2021 06:00) (56 - 70)  BP: --  BP(mean): --  ABP: 95/65 (2021 06:00) (90/60 - 117/74)  ABP(mean): 75 (2021 06:00) (68 - 85)  RR: 35 (2021 06:00) (20 - 35)  SpO2: 96% (2021 06:00) (96% - 100%)      Physical Exam:  Gen: Sedated, intubated  CNS: Sedated	  Neck: no JVD  RES : + ETT midline. clear , no wheezing              CVS: Regular  rhythm. Normal S1/S2  Abd: Soft, non-distended. Bowel sounds present.  Skin: No rash.  Ext:  no edema      ============================I/O===========================   I&O's Detail    2021 07:01  -  2021 07:00  --------------------------------------------------------  IN:    Dexmedetomidine: 514 mL    Enteral Tube Flush: 110 mL    Heparin: 267 mL    IV PiggyBack: 300 mL    IV PiggyBack: 337.5 mL    Miscellaneous Tube Feedin mL    Propofol: 245.4 mL    sodium chloride 0.9%: 200 mL  Total IN: 2693.9 mL    OUT:    Emesis (mL): 0 mL    Insulin: 0 mL    Vasopressin: 0 mL    Voided (mL): 1415 mL  Total OUT: 1415 mL    Total NET: 1278.9 mL      2021 07:01  -  2021 06:52  --------------------------------------------------------  IN:    Dexmedetomidine: 427.3 mL    Enteral Tube Flush: 360 mL    Heparin: 274 mL    IV PiggyBack: 550 mL    IV PiggyBack: 350 mL    Miscellaneous Tube Feedin mL    Propofol: 460.1 mL    sodium chloride 0.9%: 240 mL  Total IN: 3591.4 mL    OUT:    Nasogastric/Oral tube (mL): 50 mL    Voided (mL): 3165 mL  Total OUT: 3215 mL    Total NET: 376.4 mL        ============================ LABS =========================                        7.7    11.27 )-----------( 156      ( 2021 23:31 )             25.1     06-20    135  |  105  |  12  ----------------------------<  144<H>  4.2   |  17<L>  |  0.79    Ca    9.0      2021 23:31  Phos  2.0     06-20  Mg     2.0     06-20    TPro  6.8  /  Alb  3.3  /  TBili  0.8  /  DBili  x   /  AST  73<H>  /  ALT  37  /  AlkPhos  310<H>  06-20    LIVER FUNCTIONS - ( 2021 23:31 )  Alb: 3.3 g/dL / Pro: 6.8 g/dL / ALK PHOS: 310 U/L / ALT: 37 U/L / AST: 73 U/L / GGT: x           PT/INR - ( 2021 23:31 )   PT: 14.3 sec;   INR: 1.20 ratio         PTT - ( 2021 23:31 )  PTT:44.6 sec  ABG - ( 2021 23:28 )  pH, Arterial: 7.40  pH, Blood: x     /  pCO2: 32    /  pO2: 122   / HCO3: 20    / Base Excess: -4.1  /  SaO2: 99                  ======================Micro/Rad/Cardio=================  Culture: Reviewed   CXR: Reviewed  Echo:Reviewed  ======================================================  PAST MEDICAL & SURGICAL HISTORY:  CHF (congestive heart failure)    CAD (coronary artery disease)    Depression    Pleural effusion    History of 2019 novel coronavirus disease (COVID-19)  2020    Hemorrhoids    Bleeding hemorrhoids    Peripheral arterial disease    Claudication    BPH with urinary obstruction    ACC/AHA stage D systolic heart failure    Anticoagulation goal of INR 2.0 to 2.5    Falls    Clavicle fracture    CKD (chronic kidney disease), stage III    Iron deficiency anemia    H/O epistaxis    Vertigo    GI bleed    S/P TVR (tricuspid valve repair)    S/P ventricular assist device    S/P endoscopy      ====================ASSESSMENT ==============  Stage D Nonischemic Cardiomyopathy, Status Post HM2 on 2017   Recent driveline infection on 2021   Acute hypoxemic respiratory failure  Aspiration pneumonia  Septic shock  Cardiogenic shock  GI bleed , Status Post Enteroscopy   Anemia, in setting of melena   Chronic Kidney Disease  Thrombocytopenia   Leukocytosis  Coagulopathy   Stress hyperglycemia   Hemodynamic instability   Fevers        Plan:  ====================== NEUROLOGY=====================  Sedated with IV Precedex and IV Propofol drips for ventilator synchrony.   Assess neuro status per protocol when pt is off sedation.  Continue with Tylenol for fevers / analgesia.    acetaminophen    Suspension .. 650 milliGRAM(s) Oral every 6 hours PRN Temp greater or equal to 38C (100.4F)  dexMEDEtomidine Infusion 0.5 MICROgram(s)/kG/Hr (9.81 mL/Hr) IV Continuous <Continuous>  propofol Infusion 20 MICROgram(s)/kG/Min (9.42 mL/Hr) IV Continuous <Continuous>    ==================== RESPIRATORY======================  On full vent support, requiring close monitoring of respiratory rate, breathing pattern, pulse oximetry monitoring, and intermittent blood gas analysis.   CPAP trails as tolerated     Mechanical Ventilation:  Mode: CPAP with PS  FiO2: 40  PEEP: 5  PS: 15  MAP: 8.1  PIP: 21      ====================CARDIOVASCULAR==================  Stage D NICM, S/P HM2 on 2017; recent driveline infection on 2021; HM2 settings: 9000 rpm, flow 4.2.   TTE 6/15: severe global LV systolic dysfunction with HM2 in place, decreased RV systolic function, interventricular septums bows slightly to right, otherwise grossly similar to prior.   Rate control with Amiodarone.   Invasive hemodynamic monitoring, assess perfusion indices.   Monitor continuous telemetry.     aMIOdarone    Tablet 200 milliGRAM(s) Oral daily    ===================HEMATOLOGIC/ONC ===================  Acute Blood Loss Anemia, H/H 8/26.4%. Thromobocytopenia, PLTs 156k  C/w IV Heparin for LVAD with lower PTT goal of 40-50 in setting of recent GI bleed.     heparin  Infusion 400 Unit(s)/Hr (12.5 mL/Hr) IV Continuous <Continuous>    ===================== RENAL =========================  Continue to monitor I/Os, BUN/Creatinine, and urine output.   Goal net negative fluid balance. Replete lytes PRN. Keep K> 4 and Mg >2.     ==================== GASTROINTESTINAL===================  + Notable bleed in small bowel on capsule study.   Underwent endoscopy on 6/15, notable for old blood in proximal ileum, no acute intervention indicated.   Tolerating TF, Vital AF @ goal rate of 55cc/hr.  Continue Protonix for stress ulcer prophylaxis.     pantoprazole  Injectable 40 milliGRAM(s) IV Push every 12 hours  sodium chloride 0.9% lock flush 3 milliLiter(s) IV Push every 8 hours  sodium chloride 0.9%. 1000 milliLiter(s) (10 mL/Hr) IV Continuous <Continuous>    =======================    ENDOCRINE  ====================  Stress hyperglycemia, glycemic control with Admelog sliding scale.  Monitor blood glucose levels.     dextrose 50% Injectable 50 milliLiter(s) IV Push every 15 minutes  insulin lispro (ADMELOG) corrective regimen sliding scale   SubCutaneous every 6 hours    ========================INFECTIOUS DISEASE================  Temperature 98.8 with WBC within normal limit  Monitor temperature and trend WBC.   Continue empiric abx coverage with Zosyn for possible aspiration PNA.   Continue Vancomycin for C diff prophylaxis     piperacillin/tazobactam IVPB.. 3.375 Gram(s) IV Intermittent every 8 hours  vancomycin    Solution 500 milliGRAM(s) Oral every 6 hours          I have spent 35 minutes providing acute care for this critically ill patient     Patient requires continuous monitoring with bedside rhythm monitoring, pulse ox monitoring, and intermittent blood gas analysis. Care plan discussed with ICU care team. Patient remained critical and at risk for life threatening decompensation.     By signing my name below, I, Rojas Junior, attest that this documentation has been prepared under the direction and in the presence of Kota Thomas MD   Electronically signed: Cesia Cohen, 21 @ 06:52    I, Kota Thomas, personally performed the services described in this documentation. all medical record entries made by the scribe were at my direction and in my presence. I have reviewed the chart and agree that the record reflects my personal performance and is accurate and complete  Electronically signed: Kota Thomas MD        RICKY HAMPTON  MRN-[id number]  Patient is a 66y old  Male who presents with a chief complaint of Anemia, Supratherapeutic INR, Dark Stools (2021 10:49)    HPI:  65M PMH ACC/AHA stage D HF due to NICM HM2 LVAD , TV annuloplasty ring 17 as destination therapy due to severe peripheral artery disease with significant stenosis  SIADH, Depression, CKD-3 with hyperkalemia, past E. coli UTIs, driveline drainage (21) and COVID-19 (back in 2020)  He was recently seen in clinic where he complained of abdominal pain and dark stools w constipation back in May. He presents to [facility] ER today weakness and fatigue, moderate and + Black stools for three days, on coumadin secondary to warfarin use in the setting of an LVAD. Patient has required transfusions for GIB in the past. Mostly recently back in 2021 pt had anemia with dark stools. No interventions was done at that time. However Last Endoscopy was done in 2020 (negative). Today labs show patient is anemic with H/H of 4.5/16.3,. INR is 8.84 MAP in the 90s, Temp 35.1. He denies any chest pain, shortness of breath, dizziness, abd pain, nausea or vomiting.       (2021 16:57)      Surgery/Hospital Course:   admit for melena w/ anemia, INR 8.84   6/15 Capsul study (+) for small bowel bleed, balloon endoscopy (old blood in prox ileum); post EGD - septic w/ L opacity, re-intubated for concern for aspiration, TTE (Mod MR, decrease biV w/ interventricular septum boweing towards R)   bronch     Today: Plan to stop Zosyn today and continue with PO vancomycin for C diff prophylaxis. Will be placing new lines. Continue ambulation as tolerated.      ICU Vital Signs Last 24 Hrs  T(C): 37.1 (2021 04:00), Max: 38.4 (2021 16:00)  T(F): 98.8 (2021 04:00), Max: 101.1 (2021 16:00)  HR: 68 (2021 06:00) (56 - 70)  BP: --  BP(mean): --  ABP: 95/65 (2021 06:00) (90/60 - 117/74)  ABP(mean): 75 (2021 06:00) (68 - 85)  RR: 35 (2021 06:00) (20 - 35)  SpO2: 96% (2021 06:00) (96% - 100%)      Physical Exam:  Gen: Sedated, intubated  CNS: Sedated	  Neck: no JVD  RES : + ETT midline. clear , no wheezing              CVS: Regular  rhythm. Normal S1/S2  Abd: Soft, non-distended. Bowel sounds present.  Skin: No rash.  Ext:  no edema      ============================I/O===========================   I&O's Detail    2021 07:01  -  2021 07:00  --------------------------------------------------------  IN:    Dexmedetomidine: 514 mL    Enteral Tube Flush: 110 mL    Heparin: 267 mL    IV PiggyBack: 300 mL    IV PiggyBack: 337.5 mL    Miscellaneous Tube Feedin mL    Propofol: 245.4 mL    sodium chloride 0.9%: 200 mL  Total IN: 2693.9 mL    OUT:    Emesis (mL): 0 mL    Insulin: 0 mL    Vasopressin: 0 mL    Voided (mL): 1415 mL  Total OUT: 1415 mL    Total NET: 1278.9 mL      2021 07:01  -  2021 06:52  --------------------------------------------------------  IN:    Dexmedetomidine: 427.3 mL    Enteral Tube Flush: 360 mL    Heparin: 274 mL    IV PiggyBack: 550 mL    IV PiggyBack: 350 mL    Miscellaneous Tube Feedin mL    Propofol: 460.1 mL    sodium chloride 0.9%: 240 mL  Total IN: 3591.4 mL    OUT:    Nasogastric/Oral tube (mL): 50 mL    Voided (mL): 3165 mL  Total OUT: 3215 mL    Total NET: 376.4 mL        ============================ LABS =========================                        7.7    11.27 )-----------( 156      ( 2021 23:31 )             25.1     06-20    135  |  105  |  12  ----------------------------<  144<H>  4.2   |  17<L>  |  0.79    Ca    9.0      2021 23:31  Phos  2.0     06-20  Mg     2.0     06-20    TPro  6.8  /  Alb  3.3  /  TBili  0.8  /  DBili  x   /  AST  73<H>  /  ALT  37  /  AlkPhos  310<H>  0620    LIVER FUNCTIONS - ( 2021 23:31 )  Alb: 3.3 g/dL / Pro: 6.8 g/dL / ALK PHOS: 310 U/L / ALT: 37 U/L / AST: 73 U/L / GGT: x           PT/INR - ( 2021 23:31 )   PT: 14.3 sec;   INR: 1.20 ratio         PTT - ( 2021 23:31 )  PTT:44.6 sec  ABG - ( 2021 23:28 )  pH, Arterial: 7.40  pH, Blood: x     /  pCO2: 32    /  pO2: 122   / HCO3: 20    / Base Excess: -4.1  /  SaO2: 99                  ======================Micro/Rad/Cardio=================  Culture: Reviewed   CXR: Reviewed  Echo:Reviewed  ======================================================  PAST MEDICAL & SURGICAL HISTORY:  CHF (congestive heart failure)    CAD (coronary artery disease)    Depression    Pleural effusion    History of 2019 novel coronavirus disease (COVID-19)  2020    Hemorrhoids    Bleeding hemorrhoids    Peripheral arterial disease    Claudication    BPH with urinary obstruction    ACC/AHA stage D systolic heart failure    Anticoagulation goal of INR 2.0 to 2.5    Falls    Clavicle fracture    CKD (chronic kidney disease), stage III    Iron deficiency anemia    H/O epistaxis    Vertigo    GI bleed    S/P TVR (tricuspid valve repair)    S/P ventricular assist device    S/P endoscopy      ====================ASSESSMENT ==============  Stage D Nonischemic Cardiomyopathy, Status Post HM2 on 2017   Recent driveline infection on 2021   Acute hypoxemic respiratory failure  Aspiration pneumonia  Septic shock  Cardiogenic shock  GI bleed , Status Post Enteroscopy   Anemia, in setting of melena   Chronic Kidney Disease  Thrombocytopenia   Leukocytosis  Coagulopathy   Stress hyperglycemia   Hemodynamic instability   Fevers        Plan:  ====================== NEUROLOGY=====================  Sedated with IV Precedex and IV Propofol drips for ventilator synchrony.   Assess neuro status per protocol when pt is off sedation.  Continue with Tylenol for fevers / analgesia.  Continue ambulation as tolerated.     acetaminophen    Suspension .. 650 milliGRAM(s) Oral every 6 hours PRN Temp greater or equal to 38C (100.4F)  dexMEDEtomidine Infusion 0.5 MICROgram(s)/kG/Hr (9.81 mL/Hr) IV Continuous <Continuous>  propofol Infusion 20 MICROgram(s)/kG/Min (9.42 mL/Hr) IV Continuous <Continuous>    ==================== RESPIRATORY======================  On full vent support, requiring close monitoring of respiratory rate, breathing pattern, pulse oximetry monitoring, and intermittent blood gas analysis.   CPAP trails as tolerated     Mechanical Ventilation:  Mode: CPAP with PS  FiO2: 40  PEEP: 5  PS: 15  MAP: 8.1  PIP: 21      ====================CARDIOVASCULAR==================  Stage D NICM, S/P HM2 on 2017; recent driveline infection on 2021; HM2 settings: 9000 rpm, flow 4.2.   TTE 6/15: severe global LV systolic dysfunction with HM2 in place, decreased RV systolic function, interventricular septums bows slightly to right, otherwise grossly similar to prior.   Rate control with Amiodarone.   Invasive hemodynamic monitoring, assess perfusion indices.   Monitor continuous telemetry.   Will be placing new lines       aMIOdarone    Tablet 200 milliGRAM(s) Oral daily    ===================HEMATOLOGIC/ONC ===================  Acute Blood Loss Anemia, H/H 8/26.4%. Thromobocytopenia, PLTs 156k  C/w IV Heparin for LVAD with lower PTT goal of 40-50 in setting of recent GI bleed.     heparin  Infusion 400 Unit(s)/Hr (12.5 mL/Hr) IV Continuous <Continuous>    ===================== RENAL =========================  Continue to monitor I/Os, BUN/Creatinine, and urine output.   Goal net negative fluid balance. Replete lytes PRN. Keep K> 4 and Mg >2.     ==================== GASTROINTESTINAL===================  + Notable bleed in small bowel on capsule study.   Underwent endoscopy on 6/15, notable for old blood in proximal ileum, no acute intervention indicated.   Tolerating TF, Vital AF @ goal rate of 55cc/hr.  Continue Protonix for stress ulcer prophylaxis.     pantoprazole  Injectable 40 milliGRAM(s) IV Push every 12 hours  sodium chloride 0.9% lock flush 3 milliLiter(s) IV Push every 8 hours  sodium chloride 0.9%. 1000 milliLiter(s) (10 mL/Hr) IV Continuous <Continuous>    =======================    ENDOCRINE  ====================  Stress hyperglycemia, glycemic control with Admelog sliding scale.  Monitor blood glucose levels.     dextrose 50% Injectable 50 milliLiter(s) IV Push every 15 minutes  insulin lispro (ADMELOG) corrective regimen sliding scale   SubCutaneous every 6 hours    ========================INFECTIOUS DISEASE================  Temperature 98.8 with WBC within normal limit  Monitor temperature and trend WBC.   Continue empiric abx coverage with Zosyn for possible aspiration PNA.   Continue Vancomycin for C diff prophylaxis   Plan to stop Zosyn today and continue with PO vancomycin for C diff prophylaxis.     piperacillin/tazobactam IVPB.. 3.375 Gram(s) IV Intermittent every 8 hours  vancomycin    Solution 500 milliGRAM(s) Oral every 6 hours          I have spent 35 minutes providing acute care for this critically ill patient     Patient requires continuous monitoring with bedside rhythm monitoring, pulse ox monitoring, and intermittent blood gas analysis. Care plan discussed with ICU care team. Patient remained critical and at risk for life threatening decompensation.     By signing my name below, I, Rojas Junior, attest that this documentation has been prepared under the direction and in the presence of Kota Thomas MD   Electronically signed: Romel Cohen, 21 @ 06:52    I, Kota Thomas, personally performed the services described in this documentation. all medical record entries made by the romel were at my direction and in my presence. I have reviewed the chart and agree that the record reflects my personal performance and is accurate and complete  Electronically signed: Kota Thomas MD

## 2021-06-21 NOTE — PROGRESS NOTE ADULT - ASSESSMENT
Assessment and Recommendation:   · Assessment	  Assessment and recommendation :  Recurrent Acute hypoxic respiratory Failure intubated on full ventilatory support   Acute left lower lobe pneumonia  possible Aspiration pneumonia   continue IV Antibiotics with Zosyn   Non ischemic cardiomyopathy continue ACE inhibitor and B-Blockers   Septic shock and cardiogenic shock   Stage D systolic heart failure S/P LVAD HM2   MH2 LVAD  with  TV Annuloplasty  Severe peripheral vascular disease   Anion gap metabolic acidosis  severe hyperglycemia on insulin coverage    On  Amiodarone and IV heparin   critical care polyneuropathy   Anemia of Acute blood Loss   S/P Small bowel bleeding   S/P blood and FFP transfusion   Chronic kidney disease stage III  Continue NGT feeding   GI prophylaxis  critical care time spend is 35 minutes

## 2021-06-21 NOTE — PROGRESS NOTE ADULT - ATTENDING COMMENTS
Briefly, 66 y/o M w/ h/o stage D NICM s/p HM2 on 9/2017 as DT (due to severe PAD) with TV ring, prior COVID-19 infection 4/2020, recurrent syncope post LVAD s/p ILR, and chronic abdominal pain with prior negative workup who was admitted on 6/14 with symptomatic anemia with Hgb 4.5 in setting of INR 8.8 without hemodynamic instability. Video capsule showed active bleeding in the mid small bowel but subsequent enteroscopy 6/15 did not reveal any active bleeding. He acutely decompensated after procedure with fever/hypertension, low flow alarms, and pulmonary infiltrate with hypoxia requiring intubation from probable aspiration though TRALI/TACO also on the differential. Found to have C. diff on 9/20. LVAD speed was increased to 9000 to unload LV better. Currently remains intubated and deeply sedated (d/t agitation), JVP low, LVAD hum present, nontender abdomen, no pedal edema. Labs reviewed - WBC 11, Hb 7.7 (downtrending), BUN/Cr stable, LDH stable.   - wean vent as tolerated  - no diuretics needed  - MAP at goal; of note very pulsatile on A-line tracing  - c/w vanc PO for C. diff

## 2021-06-21 NOTE — PROGRESS NOTE ADULT - ASSESSMENT
66 YO M with a history of stage D NICM s/p HM2 on 9/2017 as DT (due to severe PAD) with TV ring, prior COVID-19 infection 4/2020, recurrent syncope post LVAD s/p ILR, and chronic abdominal pain with prior negative workup who was admitted with symptomatic anemia with Hgb 4.5 in setting of INR 8.8 without hemodynamic instability. He was transfused and underwent VCE which showed active bleeding in the mid small bowel but subsequent enteroscopy 6/15 did not reveal any active bleeding. He acutely decompensated after procedure with fever/hypertension, low flow alarms, and pulmonary infiltrate with hypoxia requiring intubation from probable aspiration though TRALI/TACO also on the differential. Reassuringly his cultures have been negative. His LDH is normal and there are no signs of overt LVAD dysfunction on echo though signs of insufficiently unloaded ventricle for which we increased speed on 6/19.    He remains on ventilator although CXR is improving. He has also tested positive for Cdiff and is currently receiving tx for this infxn.

## 2021-06-21 NOTE — PROGRESS NOTE ADULT - SUBJECTIVE AND OBJECTIVE BOX
RICKY JOINT  MRN#: [id number]  Subjective:  Pulmonary progress  : recurrent Acute hypoxic respiratory Failure ,aspiration pneumonia, NICM  , chart reviewed and H/O obtained radiological and Laboratory study reviewed patient Examined     65M PMH ACC/AHA stage D HF due to NICM HM2 LVAD , TV annuloplasty ring 17 as destination therapy due to severe peripheral artery disease with significant stenosis  SIADH, Depression, CKD-3 with hyperkalemia, past E. coli UTIs, driveline drainage (21) and COVID-19 (back in 2020)  He was recently seen in clinic where he complained of abdominal pain and dark stools w constipation back in May. He presents to [facility] ER today weakness and fatigue, moderate and + Black stools for three days, on coumadin secondary to warfarin use in the setting of an LVAD. Patient has required transfusions for GIB in the past. Mostly recently back in 2021 pt had anemia with dark stools. No interventions was done at that time. However Last Endoscopy was done in 2020 (negative). Today labs show patient is anemic with H/H of 4.5/16.3,. INR is 8.84 MAP in the 90s, Temp 35.1. He denies any chest pain, shortness of breath, dizziness, abd pain, nausea or vomiting. found to have  rectal bleeding underwent endoscopy ,old blood in the proximal ileum ,  develop sepsis with LL opacity given Antibiotics , Extubated , reintubated , Bronchoscopy on Zosyn for LL pneumonia  and Amiodarone S/P TV Annuloplasty , patient remain intubated on full ventilatory support .S/P multiple units of blood transfusion , remain on full ventilatory support on Precedex and propofol , new central IJ line off Dobutamine and Vasopressin, weak and tired       (2021 16:57)    PAST MEDICAL & SURGICAL HISTORY:  CHF (congestive heart failure)    CAD (coronary artery disease)  Depression    Pleural effusion    History of 2019 novel coronavirus disease (COVID-19)  2020    Hemorrhoids    Bleeding hemorrhoids    Peripheral arterial disease    Claudication    BPH with urinary obstruction    ACC/AHA stage D systolic heart failure  Anticoagulation goal of INR 2.0 to 2.5    Falls    Clavicle fracture    CKD (chronic kidney disease), stage III    Iron deficiency anemia    H/O epistaxis    Vertigo    GI bleed    S/P TVR (tricuspid valve repair)    S/P ventricular assist device    S/P endoscopy    OBJECTIVE:  ICU Vital Signs Last 24 Hrs  T(C): 38.2 (2021 10:00), Max: 38.5 (2021 12:00)  T(F): 100.8 (2021 10:00), Max: 101.3 (2021 12:00)  HR: 65 (2021 10:00) (61 - 69)  BP: --  BP(mean): --  ABP: 105/67 (2021 10:00) (90/54 - 113/64)  ABP(mean): 77 (2021 10:00) (63 - 77)  RR: 20 (2021 10:00) (19 - 35)  SpO2: 99% (2021 10:00) (96% - 100%)       @ 07:  -   @ 07:00  --------------------------------------------------------  IN: 2693.9 mL / OUT: 1415 mL / NET: 1278.9 mL     @ 07:  -   @ 10:49  --------------------------------------------------------  IN: 420.8 mL / OUT: 115 mL / NET: 305.8 mL  PHYSICAL EXAM:Daily   Elderly male intubated sedated  on full ventilatory support /40%20/5cm PEEP  0n vasopressin   Daily Weight in k.4 (2021 00:00)  HEENT:     + NCAT  + EOMI  - Conjuctival edema   - Icterus   - Thrush   - ETT  + NGT/OGT  Neck:         + FROM   LT  IJ  JVD     - Nodes     - Masses    + Mid-line trachea   - Tracheostomy  Chest: normal findings  Lungs:          + CTA   + Rhonchi    - Rales    - Wheezing     - Decreased BS   - Dullness R L  Cardiac:       + S1 + S2    + RRR   - Irregular   - S3  - S4    - Murmurs   - Rub   - Hamman’s sign   Abdomen:    + BS     + Soft    + Non-tender     - Distended    - Organomegaly  - PEG  Extremities:   - Cyanosis U/L   - Clubbing  U/L  + LE/UE Edema   + Capillary refill    + Pulses   Neuro:        - Awake   -  Alert   - Confused   - Lethargic   + Sedated  + Generalized Weakness  Skin:        - Rashes    - Erythema   + Normal incisions   + IV sites intact          HOSPITAL MEDICATIONS: All mediciations reviewed and analyzed  MEDICATIONS  (STANDING):  aMIOdarone    Tablet 200 milliGRAM(s) Oral daily  chlorhexidine 0.12% Liquid 15 milliLiter(s) Oral Mucosa every 12 hours  chlorhexidine 2% Cloths 1 Application(s) Topical <User Schedule>  dexMEDEtomidine Infusion 0.5 MICROgram(s)/kG/Hr (9.81 mL/Hr) IV Continuous <Continuous>  dextrose 50% Injectable 50 milliLiter(s) IV Push every 15 minutes  heparin  Infusion 400 Unit(s)/Hr (12.5 mL/Hr) IV Continuous <Continuous>  HYDROmorphone  Injectable 0.5 milliGRAM(s) IV Push once  insulin lispro (ADMELOG) corrective regimen sliding scale   SubCutaneous every 6 hours  pantoprazole  Injectable 40 milliGRAM(s) IV Push every 12 hours  piperacillin/tazobactam IVPB.. 3.375 Gram(s) IV Intermittent every 8 hours  propofol Infusion 20 MICROgram(s)/kG/Min (9.42 mL/Hr) IV Continuous <Continuous>  sodium chloride 0.9% lock flush 3 milliLiter(s) IV Push every 8 hours  sodium chloride 0.9%. 1000 milliLiter(s) (10 mL/Hr) IV Continuous <Continuous>    MEDICATIONS  (PRN):  acetaminophen    Suspension .. 650 milliGRAM(s) Oral every 6 hours PRN Temp greater or equal to 38C (100.4F)    LABS: All Lab data reviewed and analyzed                        7.7     )-----------( 156      ( 2021 23:31 )             25.1    06-20    135  |  105  |  12  ----------------------------<  144<H>  4.2   |  17<L>  |  0.79    Ca    9.0      2021 23:31  Phos  2.0     06-20  Mg     2.0     06-20    TPro  6.8  /  Alb  3.3  /  TBili  0.8  /  DBili  x   /  AST  73<H>  /  ALT  37  /  AlkPhos  310<H>        Ca    8.8      2021 00:42  Phos  1.6     06-20  Mg     2.0     06-20    TPro  6.7  /  Alb  3.4  /  TBili  0.8  /  DBili  x   /  AST  24  /  ALT  15  /  AlkPhos  213<H>      PT/INR - ( 2021 23:16 )   PT: 15.3 sec;   INR: 1.29 ratio         PTT - ( 2021 04:52 )  PTT:45.2 sec LIVER FUNCTIONS - ( 2021 00:42 )  Alb: 3.4 g/dL / Pro: 6.7 g/dL / ALK PHOS: 213 U/L / ALT: 15 U/L / AST: 24 U/L / GGT: x           RADIOLOGY: - Reviewed and analyzed LLL  pneumonia , LVAD HM2

## 2021-06-21 NOTE — PROGRESS NOTE ADULT - PROBLEM SELECTOR PLAN 2
- respiratory and blood cultures NGTD and procalcitonon normal  - BAL w/ hyperemic airways, but no hemorrhage  - ID following, s/p course of pip/tazo  - wean pressors as tolerates, may improve as sedation comes off  - Pt w/ +Cdiff  - agree w/ vanco PO to tx Cdiff

## 2021-06-21 NOTE — PROGRESS NOTE ADULT - SUBJECTIVE AND OBJECTIVE BOX
Patient seen and examined at bedside.    Overnight Events: This AM, remains ventilated/sedated. Cdiff pos, now treating for this infxn.    Review Of Systems: Unable to obtain.          Current Meds:  acetaminophen    Suspension .. 650 milliGRAM(s) Oral every 6 hours PRN  aMIOdarone    Tablet 200 milliGRAM(s) Oral daily  chlorhexidine 0.12% Liquid 15 milliLiter(s) Oral Mucosa every 12 hours  chlorhexidine 2% Cloths 1 Application(s) Topical <User Schedule>  dexMEDEtomidine Infusion 0.5 MICROgram(s)/kG/Hr IV Continuous <Continuous>  dextrose 50% Injectable 50 milliLiter(s) IV Push every 15 minutes  heparin  Infusion 400 Unit(s)/Hr IV Continuous <Continuous>  insulin lispro (ADMELOG) corrective regimen sliding scale   SubCutaneous every 6 hours  pantoprazole  Injectable 40 milliGRAM(s) IV Push every 12 hours  propofol Infusion 20 MICROgram(s)/kG/Min IV Continuous <Continuous>  sodium chloride 0.9% lock flush 3 milliLiter(s) IV Push every 8 hours  sodium chloride 0.9%. 1000 milliLiter(s) IV Continuous <Continuous>  vancomycin    Solution 500 milliGRAM(s) Oral every 6 hours      Vitals:  T(F): 101.7 (06-21), Max: 101.8 (06-21)  HR: 71 (06-21) (56 - 76)  BP: --  RR: 21 (06-21)  SpO2: 98% (06-21)  I&O's Summary    20 Jun 2021 07:01  -  21 Jun 2021 07:00  --------------------------------------------------------  IN: 3591.4 mL / OUT: 3215 mL / NET: 376.4 mL    21 Jun 2021 07:01  -  21 Jun 2021 15:02  --------------------------------------------------------  IN: 1133 mL / OUT: 375 mL / NET: 758 mL        Physical Exam:  Gen: Critically ill pt.  HEENT: NCAT. NGT/ETT in place.  Neck: No JVP elev.  CV: LVAD audible.  Chest: CTAB. No WRR.  Abd: +BSx4. Soft. NTND.  Ext: No LE edema.  Skin: No cyanosis.                          7.7    11.27 )-----------( 156      ( 20 Jun 2021 23:31 )             25.1     06-20    135  |  105  |  12  ----------------------------<  144<H>  4.2   |  17<L>  |  0.79    Ca    9.0      20 Jun 2021 23:31  Phos  2.0     06-20  Mg     2.0     06-20    TPro  6.8  /  Alb  3.3  /  TBili  0.8  /  DBili  x   /  AST  73<H>  /  ALT  37  /  AlkPhos  310<H>  06-20    PT/INR - ( 20 Jun 2021 23:31 )   PT: 14.3 sec;   INR: 1.20 ratio         PTT - ( 20 Jun 2021 23:31 )  PTT:44.6 sec

## 2021-06-21 NOTE — PROGRESS NOTE ADULT - PROBLEM SELECTOR PLAN 4
- Increased speed to 9000 RPM to better unload ventricle. Would repeat TTE when extubated  - Euvolemic, would keep net even   - c/w heparin but would target lower PTT goal at this time. Eventual resumption of warfarin   - Will plan to hold aspirin indefinitely  - Continue to monitor LDH daily.

## 2021-06-21 NOTE — PROGRESS NOTE ADULT - ASSESSMENT
imp/rx:  Possible aspiration pneumonitis.  No resistant Gram pos and he's adequately covered on zosyn.  Uncertain if pna or sterile pneumonitis causing fever.    suggest dc vanco in absence of mrsa.  if present it's highly likely to grow.    can stay w zosyn for now.    d/w team at rounds. imp/rx:  Possible aspiration pneumonitis at time of endoscopic procedure  Completed therapy with Zosyn      Recent course complicated by C.diff diarrhea  Abdomen somewhat distended- would consider abdominal imaging can start with an X-ray  Agree with Vanco oral 500mg qid  IF no improvement will add IV flagyl 500mg q 8hr    Morris Prater MD  [phone]  After 5pm/weekends [phone]

## 2021-06-21 NOTE — PROGRESS NOTE ADULT - SUBJECTIVE AND OBJECTIVE BOX
INFECTIOUS DISEASES FOLLOW UP-- Anitra Prater  [phone]    This is a follow up note for this  66yMale with  Anemia  Patient developed acute respiratory decompensation post endoscopy  new development of C.diff diarrhea        ROS:  CONSTITUTIONAL:  febrile, confused/agitated  CARDIOVASCULAR:  No chest pain or palpitations  RESPIRATORY:  No dyspnea  GASTROINTESTINAL: diarrhea  GENITOURINARY:  No dysuria  NEUROLOGIC:  No headache,     Allergies    No Known Allergies    Intolerances        ANTIBIOTICS/RELEVANT:  antimicrobials  vancomycin    Solution 500 milliGRAM(s) Oral every 6 hours    immunologic:    OTHER:  acetaminophen    Suspension .. 650 milliGRAM(s) Oral every 6 hours PRN  aMIOdarone    Tablet 200 milliGRAM(s) Oral daily  chlorhexidine 0.12% Liquid 15 milliLiter(s) Oral Mucosa every 12 hours  chlorhexidine 2% Cloths 1 Application(s) Topical <User Schedule>  dexMEDEtomidine Infusion 0.5 MICROgram(s)/kG/Hr IV Continuous <Continuous>  dextrose 50% Injectable 50 milliLiter(s) IV Push every 15 minutes  heparin  Infusion 400 Unit(s)/Hr IV Continuous <Continuous>  insulin lispro (ADMELOG) corrective regimen sliding scale   SubCutaneous every 6 hours  pantoprazole  Injectable 40 milliGRAM(s) IV Push every 12 hours  propofol Infusion 20 MICROgram(s)/kG/Min IV Continuous <Continuous>  sodium chloride 0.9% lock flush 3 milliLiter(s) IV Push every 8 hours  sodium chloride 0.9%. 1000 milliLiter(s) IV Continuous <Continuous>      Objective:  Vital Signs Last 24 Hrs  T(C): 38.3 (21 Jun 2021 16:00), Max: 38.8 (21 Jun 2021 11:00)  T(F): 100.9 (21 Jun 2021 16:00), Max: 101.8 (21 Jun 2021 11:00)  HR: 70 (21 Jun 2021 16:00) (56 - 76)  BP: --  BP(mean): --  RR: 20 (21 Jun 2021 16:00) (20 - 35)  SpO2: 99% (21 Jun 2021 16:00) (93% - 100%)    PHYSICAL EXAM:  Constitutional:no acute distress, but remains febrile and is more agitated today  Eyes:ALONSO, EOMI  Ear/Nose/Throat: no oral lesions, 	  Respiratory: clear BL  Cardiovascular: S1S2  Gastrointestinal: distended mild diffuse tenderness  Extremities:no e/e/c  No Lymphadenopathy  IV sites not inflammed.    LABS:                        7.7    11.27 )-----------( 156      ( 20 Jun 2021 23:31 )             25.1     06-20    135  |  105  |  12  ----------------------------<  144<H>  4.2   |  17<L>  |  0.79    Ca    9.0      20 Jun 2021 23:31  Phos  2.0     06-20  Mg     2.0     06-20    TPro  6.8  /  Alb  3.3  /  TBili  0.8  /  DBili  x   /  AST  73<H>  /  ALT  37  /  AlkPhos  310<H>  06-20    PT/INR - ( 20 Jun 2021 23:31 )   PT: 14.3 sec;   INR: 1.20 ratio         PTT - ( 20 Jun 2021 23:31 )  PTT:44.6 sec      MICROBIOLOGY:            RECENT CULTURES:  06-20 @ 20:56  .Sputum Sputum  --  --  --  --  --  06-19 @ 00:21  .Blood Blood-Peripheral  --  --  --    No growth to date.  --  06-18 @ 08:23  .Blood Blood  --  --  --    No growth to date.  --  06-17 @ 17:18  Bronch Wash Bronchoalveolar Lavage  --  --  --    No growth at 48 hours  --  06-16 @ 16:28  .Sputum Sputum  --  --  --    No growth at 48 hours  --  06-16 @ 08:20  .Sputum Sputum  --  --  --    No growth at 48 hours  --  06-15 @ 22:04  .Blood Blood-Peripheral  --  --  --    No Growth Final  --      RADIOLOGY & ADDITIONAL STUDIES:    < from: Xray Chest 1 View- PORTABLE-Routine (Xray Chest 1 View- PORTABLE-Routine in AM.) (06.21.21 @ 02:14) >  The heart isenlarged. Left lower lobe pneumonia and/or atelectasis. The right lung is clear. Endotracheal tube is  is in good position NG tube-tube is in the stomach however its tip is not seen on the current study. A left left assisted device is present. A looprecorder is seen in the right lower mid thorax. Status post sternotomy.    < end of copied text >

## 2021-06-22 NOTE — PROGRESS NOTE ADULT - SUBJECTIVE AND OBJECTIVE BOX
Patient seen and examined at bedside.    Overnight Events: Pt remains intubated/sedated. KUB concerning for large bowel dilatation.       Current Meds:  acetaminophen    Suspension .. 650 milliGRAM(s) Oral every 6 hours PRN  aMIOdarone    Tablet 200 milliGRAM(s) Oral daily  chlorhexidine 0.12% Liquid 15 milliLiter(s) Oral Mucosa every 12 hours  chlorhexidine 2% Cloths 1 Application(s) Topical <User Schedule>  dexMEDEtomidine Infusion 0.5 MICROgram(s)/kG/Hr IV Continuous <Continuous>  dextrose 50% Injectable 50 milliLiter(s) IV Push every 15 minutes  heparin  Infusion 400 Unit(s)/Hr IV Continuous <Continuous>  insulin lispro (ADMELOG) corrective regimen sliding scale   SubCutaneous every 6 hours  metoclopramide Injectable 10 milliGRAM(s) IV Push every 8 hours  metroNIDAZOLE  IVPB      metroNIDAZOLE  IVPB 500 milliGRAM(s) IV Intermittent every 6 hours  pantoprazole  Injectable 40 milliGRAM(s) IV Push every 12 hours  propofol Infusion 20 MICROgram(s)/kG/Min IV Continuous <Continuous>  sodium chloride 0.9% lock flush 3 milliLiter(s) IV Push every 8 hours  sodium chloride 0.9%. 1000 milliLiter(s) IV Continuous <Continuous>  vancomycin    Solution 500 milliGRAM(s) Oral every 6 hours      Vitals:  T(F): 97.5 (06-22), Max: 101.7 (06-21)  HR: 71 (06-22) (59 - 72)  BP: --  RR: 20 (06-22)  SpO2: 99% (06-22)  I&O's Summary    21 Jun 2021 07:01 - 22 Jun 2021 07:00  --------------------------------------------------------  IN: 3991.1 mL / OUT: 3110 mL / NET: 881.1 mL    22 Jun 2021 07:01  -  22 Jun 2021 11:33  --------------------------------------------------------  IN: 699.7 mL / OUT: 470 mL / NET: 229.7 mL        Physical Exam:  Gen: Critically ill pt.  HEENT: NCAT. ETT in place.  Neck: No JVP elev.  CV: LVAD audible.  Chest: CTAB. No WRR.  Abd: Distended, but compressible.  Ext: No LE edema.  Skin: No cyanosis.                          8.2    12.87 )-----------( 177      ( 22 Jun 2021 00:22 )             27.2     06-22    135  |  105  |  10  ----------------------------<  203<H>  4.0   |  16<L>  |  0.55    Ca    9.2      22 Jun 2021 00:21  Phos  1.6     06-22  Mg     1.9     06-22    TPro  7.1  /  Alb  2.9<L>  /  TBili  0.8  /  DBili  x   /  AST  71<H>  /  ALT  53<H>  /  AlkPhos  310<H>  06-22    PT/INR - ( 22 Jun 2021 00:22 )   PT: 14.0 sec;   INR: 1.18 ratio         PTT - ( 22 Jun 2021 08:16 )  PTT:52.8 sec

## 2021-06-22 NOTE — CHART NOTE - NSCHARTNOTEFT_GEN_A_CORE
Nutrition Follow Up Note    Patient seen for: Nutritional follow up     Source:  RN, medical record, CTU team, HF team     Chart reviewed, events noted. 65 year old male with PMHx of NICM, s.p  HM2 LVAD in , on coumadin, CKD 3 presents with anemia, dark stools and supra therapeutic  INR.  S/p capsule study, and s/p endoscopy. Course c/b possible aspiration event. GI team had been following, NGT to L[facility].   Remains intubated, sedated on pressors. Now C-Diff +,     Diet: NPO with EN via NGT.   Vital AF 1.2 @ 10ml/hr and increase by 10m/hr q4-6hrs as tolerated by Pt to goal rate of Vital AF 1.2 @ 55m/wvf32ttk. Goal rate will provide 1584kclas and 99gm protein (20kcals/kg and 1.26gm pro/kg based on dosing weight 78.5kg)    EN provision:   : 26%  : 9%  : 21%  : 64%  : 100%  : Had been infusing at goal rate, but Pt with 280ml residual at 8am, feeds now held     Pt has met 45% of goal rate EN x5 days.   Feeds held during times of GI distress  Emesis on   Last BM on . C-Diff +, being treated with PO vanco.     Pt continues to be sedated with propofol at increasing rates. Currently infusing at 23.6ml/hr, in 24hrs will provide 623kcals.  No recent triglyceride level has been drawn, spoke with PA, will draw today.     Daily Weight in k.7 (), Weight in k.5 (), Weight in k.4 (-), Weight in k.4 (-), Weight in k.8 (-), Weight in k.9 (-), Weight in k.4 (-)    Drug Dosing Weight  Weight (kg): 78.5 (15 Jamil 2021 12:14)  BMI (kg/m2): 24.8 (15 Jamil 2021 12:14)      Pertinent Medications: MEDICATIONS  (STANDING):  aMIOdarone    Tablet 200 milliGRAM(s) Oral daily  chlorhexidine 0.12% Liquid 15 milliLiter(s) Oral Mucosa every 12 hours  chlorhexidine 2% Cloths 1 Application(s) Topical <User Schedule>  dexMEDEtomidine Infusion 0.5 MICROgram(s)/kG/Hr (9.81 mL/Hr) IV Continuous <Continuous>  dextrose 50% Injectable 50 milliLiter(s) IV Push every 15 minutes  heparin  Infusion 400 Unit(s)/Hr (12 mL/Hr) IV Continuous <Continuous>  insulin lispro (ADMELOG) corrective regimen sliding scale   SubCutaneous every 6 hours  pantoprazole  Injectable 40 milliGRAM(s) IV Push every 12 hours  propofol Infusion 20 MICROgram(s)/kG/Min (9.42 mL/Hr) IV Continuous <Continuous>  sodium chloride 0.9% lock flush 3 milliLiter(s) IV Push every 8 hours  sodium chloride 0.9%. 1000 milliLiter(s) (10 mL/Hr) IV Continuous <Continuous>  vancomycin    Solution 500 milliGRAM(s) Oral every 6 hours    MEDICATIONS  (PRN):  acetaminophen    Suspension .. 650 milliGRAM(s) Oral every 6 hours PRN Temp greater or equal to 38C (100.4F)      LABS:    @ 00:21: Sodium 135, Potassium 4.0, Calcium 9.2, Magnesium 1.9, Phosphorus 1.6<L>, BUN 10, Creatinine 0.55, Glucose 203<H>  Hemoglobin : 8.2 g/dL  Hematocrit : 27.2 %        A1C with Estimated Average Glucose Result: 5.6 % (06-15-21 @ 02:42)          POCT Blood Glucose.: 235 mg/dL (21 @ 06:52)  POCT Blood Glucose.: 243 mg/dL (21 @ 06:45)  POCT Blood Glucose.: 256 mg/dL (21 @ 06:44)  POCT Blood Glucose.: 193 mg/dL (21 @ 00:12)  POCT Blood Glucose.: 166 mg/dL (21 @ 17:47)  POCT Blood Glucose.: 168 mg/dL (21 @ 12:11)  LIVER FUNCTIONS - ( 2021 00:21 )  Alb: 2.9 g/dL / Pro: 7.1 g/dL / ALK PHOS: 310 U/L / ALT: 53 U/L / AST: 71 U/L / GGT: x               Finger Sticks:  POCT Blood Glucose.: 235 mg/dL ( @ 06:52)  POCT Blood Glucose.: 243 mg/dL ( @ 06:45)  POCT Blood Glucose.: 256 mg/dL ( @ 06:44)  POCT Blood Glucose.: 193 mg/dL ( @ 00:12)  POCT Blood Glucose.: 166 mg/dL ( @ 17:47)  POCT Blood Glucose.: 168 mg/dL ( @ 12:11)      Skin per nursing documentation:   Edema:    Estimated Needs:   [ X] no change since previous assessment  Estimated Energy Needs: Based on dosing weight: 78.5kg   20-25 shantelle/kg   · Calculated From (shantelle/kg)	1570  · Calculated To (shantelle/kg)	1962     Estimated Protein Needs:  1.2-1.4 gm pro/kg   · Calculated From (g/kg)	94.2  · Calculated To (g/kg)	109.9    Previous Nutrition Diagnosis: None   Nutrition Diagnosis is:    New Nutrition Diagnosis: new acute moderate malnutrition   Related to: altered GI function   As evidenced by: meeting 45% of EN provision x 5 days, mild edema      Interventions:     Recommend  1. Draw Triglyceride level   2. Decrease Vital Af 1.2 to 45ml/hr x24hrs + 1 no carb pro source in the setting of escalating propofol use. Will provide: 1336cals and 92gm protein. (17.0kcal/kg and 1.17gm pro/kg based on dosing bony). Of note, Pt receiving and addition 623cals from propofol. RD will continue to adjsut EN rate as indicated  3. Consider adding a prokinetic agent in the setting of increase EN residuals   4. Trend BG levels, renal indices, LFT's, electrolytes and triglycerides   5. Malnutrition alert in chart     Monitoring and Evaluation:   Continue to monitor nutritional intake, tolerance to diet prescription, weights, labs, skin integrity  RD remains available upon request and will follow up per protocol  Clarisa Greene RD, CDN, [facility] Pager #[phone]

## 2021-06-22 NOTE — PROGRESS NOTE ADULT - ASSESSMENT
Assessment and Recommendation:   · Assessment	  Assessment and recommendation :  Recurrent Acute hypoxic respiratory Failure intubated on full ventilatory support   Acute left lower lobe pneumonia  possible Aspiration pneumonia   Diarrhea +ve for C-diff. colitis on PO vancomycin and IV Flagyl   CT scan of abdomen R/O toxic megacolon   Non ischemic cardiomyopathy continue ACE inhibitor and B-Blockers   Septic shock and cardiogenic shock   Stage D systolic heart failure S/P LVAD HM2   MH2 LVAD  with  TV Annuloplasty  Severe peripheral vascular disease   Anion gap metabolic acidosis  severe hyperglycemia on insulin coverage    On  Amiodarone and IV heparin   critical care polyneuropathy   Anemia of Acute blood Loss   S/P Small bowel bleeding   S/P blood and FFP transfusion   Chronic kidney disease stage III  Continue NGT feeding   GI prophylaxis  discuss with TCV surgeon   critical care time spend is 35 minutes

## 2021-06-22 NOTE — PROGRESS NOTE ADULT - PROBLEM SELECTOR PLAN 4
- no further active blood loss since correction of INR and no active bleeding seen on enteroscopy  - continue to trend CBC closely as anticoagulation re-initiated  - c/w pantop IV bid

## 2021-06-22 NOTE — PROGRESS NOTE ADULT - SUBJECTIVE AND OBJECTIVE BOX
INFECTIOUS DISEASES FOLLOW UP-- Anitra Prater  [phone]    This is a follow up note for this  66yMale with  C.diff colitis, remains intubated, sedated, somewhat distended abdomen        ROS:  CONSTITUTIONAL: intubated, sedated     Allergies    No Known Allergies    Intolerances        ANTIBIOTICS/RELEVANT:  antimicrobials  metroNIDAZOLE  IVPB      metroNIDAZOLE  IVPB 500 milliGRAM(s) IV Intermittent every 6 hours  vancomycin    Solution 500 milliGRAM(s) Oral every 6 hours    immunologic:    OTHER:  acetaminophen    Suspension .. 650 milliGRAM(s) Oral every 6 hours PRN  aMIOdarone    Tablet 200 milliGRAM(s) Oral daily  chlorhexidine 0.12% Liquid 15 milliLiter(s) Oral Mucosa every 12 hours  chlorhexidine 2% Cloths 1 Application(s) Topical <User Schedule>  clonazePAM  Tablet 1 milliGRAM(s) Oral every 8 hours  dexMEDEtomidine Infusion 0.5 MICROgram(s)/kG/Hr IV Continuous <Continuous>  dextrose 50% Injectable 50 milliLiter(s) IV Push every 15 minutes  heparin  Infusion 400 Unit(s)/Hr IV Continuous <Continuous>  insulin lispro (ADMELOG) corrective regimen sliding scale   SubCutaneous every 6 hours  ketamine Infusion. 0.5 mG/kG/Hr IV Continuous <Continuous>  metoclopramide Injectable 10 milliGRAM(s) IV Push every 8 hours  pantoprazole  Injectable 40 milliGRAM(s) IV Push every 12 hours  propofol Infusion 20 MICROgram(s)/kG/Min IV Continuous <Continuous>  sodium chloride 0.9% lock flush 3 milliLiter(s) IV Push every 8 hours  sodium chloride 0.9%. 1000 milliLiter(s) IV Continuous <Continuous>      Objective:  Vital Signs Last 24 Hrs  T(C): 36 (22 Jun 2021 16:00), Max: 37.5 (21 Jun 2021 20:00)  T(F): 96.8 (22 Jun 2021 16:00), Max: 99.5 (21 Jun 2021 20:00)  HR: 55 (22 Jun 2021 17:00) (50 - 71)  BP: --  BP(mean): --  RR: 20 (22 Jun 2021 17:00) (20 - 23)  SpO2: 99% (22 Jun 2021 17:00) (97% - 100%)    PHYSICAL EXAM:  Constitutional:no acute distress but sedated on propofol  Eyes:ALONSO, EOMI  Ear/Nose/Throat: no oral lesions, 	  Respiratory: clear BL  Cardiovascular: S1S2  Gastrointestinal:soft, (+) BS, no tenderness  Extremities:no e/e/c  No Lymphadenopathy  IV sites not inflammed.    LABS:                        8.2    12.87 )-----------( 177      ( 22 Jun 2021 00:22 )             27.2     06-22    135  |  105  |  10  ----------------------------<  203<H>  4.0   |  16<L>  |  0.55    Ca    9.2      22 Jun 2021 00:21  Phos  1.6     06-22  Mg     1.9     06-22    TPro  7.1  /  Alb  2.9<L>  /  TBili  0.8  /  DBili  x   /  AST  71<H>  /  ALT  53<H>  /  AlkPhos  310<H>  06-22    PT/INR - ( 22 Jun 2021 00:22 )   PT: 14.0 sec;   INR: 1.18 ratio         PTT - ( 22 Jun 2021 15:13 )  PTT:44.7 sec      MICROBIOLOGY:            RECENT CULTURES:  06-20 @ 20:56  .Sputum Sputum  --  --  --    Normal Respiratory Bria present  --  06-20 @ 16:31  .Blood Blood-Peripheral  Blood Culture PCR  Blood Culture PCR  PCR    No growth to date.  --  06-19 @ 00:21  .Blood Blood-Peripheral  --  --  --    No growth to date.  --  06-18 @ 08:23  .Blood Blood  --  --  --    No growth to date.  --  06-17 @ 17:18  Bronch Wash Bronchoalveolar Lavage  --  --  --    No growth at 48 hours  --  06-16 @ 16:28  .Sputum Sputum  --  --  --    No growth at 48 hours  --  06-16 @ 08:20  .Sputum Sputum  --  --  --    No growth at 48 hours  --  06-15 @ 22:04  .Blood Blood-Peripheral  --  --  --    No Growth Final  --      RADIOLOGY & ADDITIONAL STUDIES:  < from: CT Abdomen and Pelvis w/ IV Cont (06.22.21 @ 10:44) >  IMPRESSION:  Fluid-filled colon which may be secondary to rapid transit.    Small bilateral pleural effusions with associated compressive atelectasis..    New left upper and lower lobe parenchymal opacities, suspicious for pneumonia.    Moderate stenosis in the proximal superior mesenteric artery    < end of copied text >

## 2021-06-22 NOTE — PROGRESS NOTE ADULT - PROBLEM SELECTOR PLAN 2
-Appr ID recs  -Cdiff+  -Contact precautions  -C/w vanco PO for tx  -KUB concerning. F/u CT A/P results.

## 2021-06-22 NOTE — PATIENT PROFILE ADULT - CAREGIVER OBTAIN DETAILS
patient sedated
Detail Level: Simple
Additional Notes: The patient will take the medication every other day.
Render Risk Assessment In Note?: no

## 2021-06-22 NOTE — PROGRESS NOTE ADULT - ASSESSMENT
imp/rx:  Possible aspiration pneumonitis at time of endoscopic procedure  Completed therapy with Zosyn      Recent course complicated by C.diff diarrhea    Abdomen somewhat distended- CT scan shows fluid fiulled colon but is not suggestive of toxic nita colon at this point  Agree with Vanco oral 500mg qid  Will add IV flagyl 500mg q 8hr  Will consider Tigecycline if wihtout improvement in GI distension    Morris Prater MD  [phone]  After 5pm/weekends [phone]

## 2021-06-22 NOTE — PROGRESS NOTE ADULT - SUBJECTIVE AND OBJECTIVE BOX
RICKY HAMPTON  MRN-[id number]  Patient is a 66y old  Male who presents with a chief complaint of Anemia, Supratherapeutic INR, Dark Stools (2021 16:55)      HPI:  65M PMH ACC/AHA stage D HF due to NICM HM2 LVAD , TV annuloplasty ring 17 as destination therapy due to severe peripheral artery disease with significant stenosis  SIADH, Depression, CKD-3 with hyperkalemia, past E. coli UTIs, driveline drainage (21) and COVID-19 (back in 2020)  He was recently seen in clinic where he complained of abdominal pain and dark stools w constipation back in May. He presents to [facility] ER today weakness and fatigue, moderate and + Black stools for three days, on coumadin secondary to warfarin use in the setting of an LVAD. Patient has required transfusions for GIB in the past. Mostly recently back in 2021 pt had anemia with dark stools. No interventions was done at that time. However Last Endoscopy was done in 2020 (negative). Today labs show patient is anemic with H/H of 4.5/16.3,. INR is 8.84 MAP in the 90s, Temp 35.1. He denies any chest pain, shortness of breath, dizziness, abd pain, nausea or vomiting.       (2021 16:57)      Surgery/Hospital Course:   admit for melena w/ anemia, INR 8.84   6/15 Capsul study (+) for small bowel bleed, balloon endoscopy (old blood in prox ileum); post EGD - septic w/ L opacity, re-intubated for concern for aspiration, TTE (Mod MR, decrease biV w/ interventricular septum boweing towards R)   bronch     Today:  No acute events     ICU Vital Signs Last 24 Hrs  T(C): 36.8 (2021 04:00), Max: 38.8 (2021 11:00)  T(F): 98.2 (2021 04:00), Max: 101.8 (2021 11:00)  HR: 63 (2021 05:00) (59 - 76)  BP: --  BP(mean): --  ABP: 110/68 (2021 05:00) (95/61 - 124/73)  ABP(mean): 82 (2021 05:00) (72 - 91)  RR: 20 (2021 05:00) (20 - 28)  SpO2: 100% (2021 05:00) (93% - 100%)      Physical Exam:  Gen: intubated  CNS: Sedated	  Neck: no JVD  RES : + ETT midline. clear , no wheezing              CVS: Regular  rhythm. Normal S1/S2  Abd: Soft, non-distended. Bowel sounds present.  Skin: No rash.  Ext:  no edema    ============================I/O===========================   I&O's Detail    2021 07:01  -  2021 07:00  --------------------------------------------------------  IN:    Dexmedetomidine: 427.3 mL    Enteral Tube Flush: 360 mL    Heparin: 274 mL    IV PiggyBack: 550 mL    IV PiggyBack: 350 mL    Miscellaneous Tube Feedin mL    Propofol: 460.1 mL    sodium chloride 0.9%: 240 mL  Total IN: 3591.4 mL    OUT:    Nasogastric/Oral tube (mL): 50 mL    Voided (mL): 3165 mL  Total OUT: 3215 mL    Total NET: 376.4 mL      2021 07:01  -  2021 06:29  --------------------------------------------------------  IN:    Dexmedetomidine: 597.8 mL    Enteral Tube Flush: 300 mL    Heparin: 272.5 mL    IV PiggyBack: 266.6 mL    IV PiggyBack: 200 mL    Miscellaneous Tube Feedin mL    Propofol: 497.6 mL    sodium chloride 0.9%: 220 mL  Total IN: 3564.5 mL    OUT:    Indwelling Catheter - Urethral (mL): 1475 mL    Voided (mL): 885 mL  Total OUT: 2360 mL    Total NET: 1204.5 mL        ============================ LABS =========================                        8.2    12.87 )-----------( 177      ( 2021 00:22 )             27.2     -    135  |  105  |  10  ----------------------------<  203<H>  4.0   |  16<L>  |  0.55    Ca    9.2      2021 00:21  Phos  1.6       Mg     1.9         TPro  7.1  /  Alb  2.9<L>  /  TBili  0.8  /  DBili  x   /  AST  71<H>  /  ALT  53<H>  /  AlkPhos  310<H>      LIVER FUNCTIONS - ( 2021 00:21 )  Alb: 2.9 g/dL / Pro: 7.1 g/dL / ALK PHOS: 310 U/L / ALT: 53 U/L / AST: 71 U/L / GGT: x           PT/INR - ( 2021 00:22 )   PT: 14.0 sec;   INR: 1.18 ratio         PTT - ( 2021 00:22 )  PTT:56.2 sec  ABG - ( 2021 00:16 )  pH, Arterial: 7.41  pH, Blood: x     /  pCO2: 33    /  pO2: 83    / HCO3: 20    / Base Excess: -3.4  /  SaO2: 97                  ======================Micro/Rad/Cardio=================  Culture: Reviewed   CXR: Reviewed  Echo:Reviewed  ======================================================  PAST MEDICAL & SURGICAL HISTORY:  CHF (congestive heart failure)    CAD (coronary artery disease)    Depression    Pleural effusion    History of 2019 novel coronavirus disease (COVID-19)  2020    Hemorrhoids    Bleeding hemorrhoids    Peripheral arterial disease    Claudication    BPH with urinary obstruction    ACC/AHA stage D systolic heart failure    Anticoagulation goal of INR 2.0 to 2.5    Falls    Clavicle fracture    CKD (chronic kidney disease), stage III    Iron deficiency anemia    H/O epistaxis    Vertigo    GI bleed    S/P TVR (tricuspid valve repair)    S/P ventricular assist device    S/P endoscopy      ====================ASSESSMENT ==============  Stage D Nonischemic Cardiomyopathy, Status Post HM2 on 2017   Recent driveline infection on 2021   Acute hypoxemic respiratory failure  Aspiration pneumonia  Septic shock  Cardiogenic shock  GI bleed , Status Post Enteroscopy   Anemia, in setting of melena   Chronic Kidney Disease  Leukocytosis  Coagulopathy   Stress hyperglycemia   Hemodynamic instability   Hypovolemic shock    Plan:  ====================== NEUROLOGY=====================  Sedated with IV Precedex and IV Propofol drips for ventilator synchrony.  Continue monitoring neuro status   Tylenol PRN fevers     acetaminophen    Suspension .. 650 milliGRAM(s) Oral every 6 hours PRN Temp greater or equal to 38C (100.4F)  dexMEDEtomidine Infusion 0.5 MICROgram(s)/kG/Hr (9.81 mL/Hr) IV Continuous <Continuous>  propofol Infusion 20 MICROgram(s)/kG/Min (9.42 mL/Hr) IV Continuous <Continuous>    ==================== RESPIRATORY======================  On full vent support, requiring close monitoring of respiratory rate, breathing pattern, pulse oximetry monitoring, and intermittent blood gas analysis.     Mechanical Ventilation:  Mode: AC/ CMV (Assist Control/ Continuous Mandatory Ventilation)  RR (machine): 20  TV (machine): 500  FiO2: 40  PEEP: 5  ITime: 1  MAP: 13  PIP: 34      ====================CARDIOVASCULAR==================  Stage D NICM, S/P HM2 on 2017; recent driveline infection on 2021; HM2 settings: 9000 rpm, flow 4.4.   TTE 6/15: severe global LV systolic dysfunction with HM2 in place, decreased RV systolic function, interventricular septums bows slightly to right, otherwise grossly similar to prior.   Continue invasive hemodynamic monitoring   Rate control with Amiodarone    aMIOdarone    Tablet 200 milliGRAM(s) Oral daily    ===================HEMATOLOGIC/ONC ===================  Acute blood loss anemia, monitor H&H/Plts    Continue AC therapy with IV Heparin for HM2 circuit and VTE prophylaxis      heparin  Infusion 400 Unit(s)/Hr (12 mL/Hr) IV Continuous <Continuous>    ===================== RENAL =========================  Continue monitoring urine output, I&OS, BUN/Cr   Target net negative fluid balance   Monitor and replete lytes prn     ==================== GASTROINTESTINAL===================  Tolerating tube feeds, Vital AF @ 55cc/hr     GI prophylaxis, pantoprazole  Injectable 40 milliGRAM(s) IV Push every 12 hours  sodium chloride 0.9% lock flush 3 milliLiter(s) IV Push every 8 hours  sodium chloride 0.9%. 1000 milliLiter(s) (10 mL/Hr) IV Continuous <Continuous>    =======================    ENDOCRINE  =====================  Continue glucose control with admelog sliding scale for stress hyperglycemia     dextrose 50% Injectable 50 milliLiter(s) IV Push every 15 minutes  insulin lispro (ADMELOG) corrective regimen sliding scale   SubCutaneous every 6 hours    ========================INFECTIOUS DISEASE================  Afebrile, WBC WBC rising 11.27->12.87   Continue trending WBC and monitoring fever curve   C.diff prophylaxis with Vancomycin     vancomycin    Solution 500 milliGRAM(s) Oral every 6 hours          I have spent 35 minutes providing acute care for this critically ill patient     Patient requires continuous monitoring with bedside rhythm monitoring, pulse ox monitoring, and intermittent blood gas analysis. Care plan discussed with ICU care team. Patient remained critical and at risk for life threatening decompensation.     By signing my name below, I, Agnieszka Cherry, attest that this documentation has been prepared under the direction and in the presence of Kota Thomas MD   Electronically signed: Cesia Shaffer, 21 @ 06:29    I, Kota Thomas, personally performed the services described in this documentation. all medical record entries made by the scribe were at my direction and in my presence. I have reviewed the chart and agree that the record reflects my personal performance and is accurate and complete  Electronically signed: Kota Thomas MD        RICKY HAMPTON  MRN-[id number]  Patient is a 66y old  Male who presents with a chief complaint of Anemia, Supratherapeutic INR, Dark Stools (2021 16:55)      HPI:  65M PMH ACC/AHA stage D HF due to NICM HM2 LVAD , TV annuloplasty ring 17 as destination therapy due to severe peripheral artery disease with significant stenosis  SIADH, Depression, CKD-3 with hyperkalemia, past E. coli UTIs, driveline drainage (21) and COVID-19 (back in 2020)  He was recently seen in clinic where he complained of abdominal pain and dark stools w constipation back in May. He presents to [facility] ER today weakness and fatigue, moderate and + Black stools for three days, on coumadin secondary to warfarin use in the setting of an LVAD. Patient has required transfusions for GIB in the past. Mostly recently back in 2021 pt had anemia with dark stools. No interventions was done at that time. However Last Endoscopy was done in 2020 (negative). Today labs show patient is anemic with H/H of 4.5/16.3,. INR is 8.84 MAP in the 90s, Temp 35.1. He denies any chest pain, shortness of breath, dizziness, abd pain, nausea or vomiting.       (2021 16:57)      Surgery/Hospital Course:   admit for melena w/ anemia, INR 8.84   6/15 Capsul study (+) for small bowel bleed, balloon endoscopy (old blood in prox ileum); post EGD - septic w/ L opacity, re-intubated for concern for aspiration, TTE (Mod MR, decrease biV w/ interventricular septum boweing towards R)   bronch     Today:  +C.diff on , continue vancomycin for antibiotics regimen, will add IV Flagyl today. Also with enteral feed intolerance, will order CT scan for further evaluation.    ICU Vital Signs Last 24 Hrs  T(C): 36.8 (2021 04:00), Max: 38.8 (2021 11:00)  T(F): 98.2 (2021 04:00), Max: 101.8 (2021 11:00)  HR: 63 (2021 05:00) (59 - 76)  BP: --  BP(mean): --  ABP: 110/68 (2021 05:00) (95/61 - 124/73)  ABP(mean): 82 (2021 05:00) (72 - 91)  RR: 20 (2021 05:00) (20 - 28)  SpO2: 100% (2021 05:00) (93% - 100%)      Physical Exam:  Gen: intubated  CNS: Sedated	  Neck: no JVD  RES : + ETT midline. clear , no wheezing              CVS: Regular  rhythm. Normal S1/S2  Abd: Soft, non-distended. +hypoactive bowel sounds   Skin: No rash.  Ext:  no edema    ============================I/O===========================   I&O's Detail    2021 07:01  -  2021 07:00  --------------------------------------------------------  IN:    Dexmedetomidine: 427.3 mL    Enteral Tube Flush: 360 mL    Heparin: 274 mL    IV PiggyBack: 550 mL    IV PiggyBack: 350 mL    Miscellaneous Tube Feedin mL    Propofol: 460.1 mL    sodium chloride 0.9%: 240 mL  Total IN: 3591.4 mL    OUT:    Nasogastric/Oral tube (mL): 50 mL    Voided (mL): 3165 mL  Total OUT: 3215 mL    Total NET: 376.4 mL      2021 07:01  -  2021 06:29  --------------------------------------------------------  IN:    Dexmedetomidine: 597.8 mL    Enteral Tube Flush: 300 mL    Heparin: 272.5 mL    IV PiggyBack: 266.6 mL    IV PiggyBack: 200 mL    Miscellaneous Tube Feedin mL    Propofol: 497.6 mL    sodium chloride 0.9%: 220 mL  Total IN: 3564.5 mL    OUT:    Indwelling Catheter - Urethral (mL): 1475 mL    Voided (mL): 885 mL  Total OUT: 2360 mL    Total NET: 1204.5 mL        ============================ LABS =========================                        8.2    12.87 )-----------( 177      ( 2021 00:22 )             27.2         135  |  105  |  10  ----------------------------<  203<H>  4.0   |  16<L>  |  0.55    Ca    9.2      2021 00:21  Phos  1.6       Mg     1.9         TPro  7.1  /  Alb  2.9<L>  /  TBili  0.8  /  DBili  x   /  AST  71<H>  /  ALT  53<H>  /  AlkPhos  310<H>      LIVER FUNCTIONS - ( 2021 00:21 )  Alb: 2.9 g/dL / Pro: 7.1 g/dL / ALK PHOS: 310 U/L / ALT: 53 U/L / AST: 71 U/L / GGT: x           PT/INR - ( 2021 00:22 )   PT: 14.0 sec;   INR: 1.18 ratio         PTT - ( 2021 00:22 )  PTT:56.2 sec  ABG - ( 2021 00:16 )  pH, Arterial: 7.41  pH, Blood: x     /  pCO2: 33    /  pO2: 83    / HCO3: 20    / Base Excess: -3.4  /  SaO2: 97                  ======================Micro/Rad/Cardio=================  Culture: Reviewed   CXR: Reviewed  Echo:Reviewed  ======================================================  PAST MEDICAL & SURGICAL HISTORY:  CHF (congestive heart failure)    CAD (coronary artery disease)    Depression    Pleural effusion    History of 2019 novel coronavirus disease (COVID-19)  2020    Hemorrhoids    Bleeding hemorrhoids    Peripheral arterial disease    Claudication    BPH with urinary obstruction    ACC/AHA stage D systolic heart failure    Anticoagulation goal of INR 2.0 to 2.5    Falls    Clavicle fracture    CKD (chronic kidney disease), stage III    Iron deficiency anemia    H/O epistaxis    Vertigo    GI bleed    S/P TVR (tricuspid valve repair)    S/P ventricular assist device    S/P endoscopy      ====================ASSESSMENT ==============  Stage D Nonischemic Cardiomyopathy, Status Post HM2 on 2017   Recent driveline infection on 2021   Acute hypoxemic respiratory failure  Aspiration pneumonia  Septic shock  Cardiogenic shock  GI bleed , Status Post Enteroscopy   Anemia, in setting of melena   Chronic Kidney Disease  Leukocytosis  Coagulopathy   Stress hyperglycemia   Hemodynamic instability   Hypovolemic shock  C.diff positive on      Plan:  ====================== NEUROLOGY=====================  Sedated with IV Precedex and IV Propofol drips for ventilator synchrony.  Continue monitoring neuro status   Tylenol PRN fevers     acetaminophen    Suspension .. 650 milliGRAM(s) Oral every 6 hours PRN Temp greater or equal to 38C (100.4F)  dexMEDEtomidine Infusion 0.5 MICROgram(s)/kG/Hr (9.81 mL/Hr) IV Continuous <Continuous>  propofol Infusion 20 MICROgram(s)/kG/Min (9.42 mL/Hr) IV Continuous <Continuous>    ==================== RESPIRATORY======================  On full vent support, requiring close monitoring of respiratory rate, breathing pattern, pulse oximetry monitoring, and intermittent blood gas analysis.     Mechanical Ventilation:  Mode: AC/ CMV (Assist Control/ Continuous Mandatory Ventilation)  RR (machine): 20  TV (machine): 500  FiO2: 40  PEEP: 5  ITime: 1  MAP: 13  PIP: 34      ====================CARDIOVASCULAR==================  Stage D NICM, S/P HM2 on 2017; recent driveline infection on 2021; HM2 settings: 9000 rpm, flow 4.4.   TTE 6/15: severe global LV systolic dysfunction with HM2 in place, decreased RV systolic function, interventricular septums bows slightly to right, otherwise grossly similar to prior.   Continue invasive hemodynamic monitoring   Rate control with Amiodarone    aMIOdarone    Tablet 200 milliGRAM(s) Oral daily    ===================HEMATOLOGIC/ONC ===================  Acute blood loss anemia, monitor H&H/Plts    Continue AC therapy with IV Heparin for HM2 circuit and VTE prophylaxis      heparin  Infusion 400 Unit(s)/Hr (12 mL/Hr) IV Continuous <Continuous>    ===================== RENAL =========================  Continue monitoring urine output, I&OS, BUN/Cr   Target net negative fluid balance   Monitor and replete lytes prn     ==================== GASTROINTESTINAL===================  Tolerating tube feeds, Vital AF @ 55cc/hr   +C.diff on , also with enteral feed intolerance, will order CT scan for further evaluation today     GI prophylaxis, pantoprazole  Injectable 40 milliGRAM(s) IV Push every 12 hours  sodium chloride 0.9% lock flush 3 milliLiter(s) IV Push every 8 hours  sodium chloride 0.9%. 1000 milliLiter(s) (10 mL/Hr) IV Continuous <Continuous>    =======================    ENDOCRINE  =====================  Continue glucose control with admelog sliding scale for stress hyperglycemia     dextrose 50% Injectable 50 milliLiter(s) IV Push every 15 minutes  insulin lispro (ADMELOG) corrective regimen sliding scale   SubCutaneous every 6 hours    ========================INFECTIOUS DISEASE================  Afebrile, WBC WBC rising 11.27->12.87   Continue trending WBC and monitoring fever curve   +C.diff on , c/w vancomycin; will add IV Flagyl     vancomycin    Solution 500 milliGRAM(s) Oral every 6 hours          I have spent 75 minutes providing acute care for this critically ill patient     Patient requires continuous monitoring with bedside rhythm monitoring, pulse ox monitoring, and intermittent blood gas analysis. Care plan discussed with ICU care team. Patient remained critical and at risk for life threatening decompensation.     By signing my name below, I, Agnieszka Cherry, attest that this documentation has been prepared under the direction and in the presence of Kota Thomas MD   Electronically signed: Romel Shaffer, 21 @ 06:29    I, Kota Thomas, personally performed the services described in this documentation. all medical record entries made by the romel were at my direction and in my presence. I have reviewed the chart and agree that the record reflects my personal performance and is accurate and complete  Electronically signed: Kota Thomas MD

## 2021-06-22 NOTE — PROGRESS NOTE ADULT - ATTENDING COMMENTS
Briefly, 64 y/o M w/ h/o stage D NICM s/p HM2 on 9/2017 as DT (due to severe PAD) with TV ring, prior COVID-19 infection 4/2020, recurrent syncope post LVAD s/p ILR, and chronic abdominal pain with prior negative workup who was admitted on 6/14 with symptomatic anemia with Hgb 4.5 in setting of INR 8.8 without hemodynamic instability. Video capsule showed active bleeding in the mid small bowel but subsequent enteroscopy 6/15 did not reveal any active bleeding. He acutely decompensated after procedure with fever/hypertension, low flow alarms, and pulmonary infiltrate with hypoxia requiring intubation from probable aspiration though TRALI/TACO also on the differential. Found to have C. diff on 9/20. LVAD speed was increased to 9000 to unload LV better. Currently remains intubated and deeply sedated (d/t agitation), JVP low, LVAD hum present, distended abdomen, no pedal edema. Labs reviewed - WBC 11, Hb 7.7 (downtrending), BUN/Cr stable, LDH stable. Abdom x-ray with distended transverse colon; awaiting CT.   - wean vent as tolerated  - no diuretics needed  - MAP at goal; of note very pulsatile on A-line tracing  - c/w vanc PO for C. diff  - f/u CT scan; possible surgical c/s if c/w toxic megacolon

## 2021-06-22 NOTE — PROGRESS NOTE ADULT - ASSESSMENT
66 YO M with a history of stage D NICM s/p HM2 on 9/2017 as DT (due to severe PAD) with TV ring, prior COVID-19 infection 4/2020, recurrent syncope post LVAD s/p ILR, and chronic abdominal pain with prior negative workup who was admitted with symptomatic anemia with Hgb 4.5 in setting of INR 8.8 without hemodynamic instability. He was transfused and underwent VCE which showed active bleeding in the mid small bowel but subsequent enteroscopy 6/15 did not reveal any active bleeding. He acutely decompensated after procedure with fever/hypertension, low flow alarms, and pulmonary infiltrate with hypoxia requiring intubation from probable aspiration though TRALI/TACO also on the differential. Reassuringly his cultures have been negative. His LDH is normal and there are no signs of overt LVAD dysfunction on echo though signs of insufficiently unloaded ventricle for which we increased speed on 6/19.    He remains on ventilator although CXR is improving. He has also tested positive for Cdiff and is currently receiving tx for this infxn. There is concern for ileus/toxic megacolon.

## 2021-06-22 NOTE — PROGRESS NOTE ADULT - PROBLEM SELECTOR PLAN 3
- Bcx x 1 w/ S.epi, likely contaminant  - respiratory and blood cultures NGTD and procalcitonin normal  - BAL w/ hyperemic airways, but no hemorrhage  - ID following, s/p course of pip/tazo  - wean pressors as tolerates, may improve as sedation comes off

## 2021-06-22 NOTE — PROGRESS NOTE ADULT - SUBJECTIVE AND OBJECTIVE BOX
RICKY JOINT  MRN#: [id number]  Subjective:  Pulmonary progress  : recurrent Acute hypoxic respiratory Failure ,aspiration pneumonia, NICM  , chart reviewed and H/O obtained radiological and Laboratory study reviewed patient Examined     65M PMH ACC/AHA stage D HF due to NICM HM2 LVAD , TV annuloplasty ring 17 as destination therapy due to severe peripheral artery disease with significant stenosis  SIADH, Depression, CKD-3 with hyperkalemia, past E. coli UTIs, driveline drainage (21) and COVID-19 (back in 2020)  He was recently seen in clinic where he complained of abdominal pain and dark stools w constipation back in May. He presents to [facility] ER today weakness and fatigue, moderate and + Black stools for three days, on coumadin secondary to warfarin use in the setting of an LVAD. Patient has required transfusions for GIB in the past. Mostly recently back in 2021 pt had anemia with dark stools. No interventions was done at that time. However Last Endoscopy was done in 2020 (negative). Today labs show patient is anemic with H/H of 4.5/16.3,. INR is 8.84 MAP in the 90s, Temp 35.1. He denies any chest pain, shortness of breath, dizziness, abd pain, nausea or vomiting. found to have  rectal bleeding underwent endoscopy ,old blood in the proximal ileum ,  develop sepsis with LL opacity given Antibiotics , Extubated , reintubated , Bronchoscopy on Zosyn for LL pneumonia  and Amiodarone S/P TV Annuloplasty , patient remain intubated on full ventilatory support .S/P multiple units of blood transfusion , remain on full ventilatory support on Precedex and propofol , new central IJ line , diarrhea C diff. +ve on po vancomycin and IV Flagyl, distended belly , and colon CT scan of Abdomen to R/O toxic megacolon       (2021 16:57)    PAST MEDICAL & SURGICAL HISTORY:  CHF (congestive heart failure)    CAD (coronary artery disease)  Depression    Pleural effusion    History of 2019 novel coronavirus disease (COVID-19)  2020    Hemorrhoids    Bleeding hemorrhoids    Peripheral arterial disease    Claudication    BPH with urinary obstruction    ACC/AHA stage D systolic heart failure  Anticoagulation goal of INR 2.0 to 2.5    Falls    Clavicle fracture    CKD (chronic kidney disease), stage III    Iron deficiency anemia    H/O epistaxis    Vertigo    GI bleed    S/P TVR (tricuspid valve repair)    S/P ventricular assist device    S/P endoscopy    OBJECTIVE:  ICU Vital Signs Last 24 Hrs  T(C): 38.2 (2021 10:00), Max: 38.5 (2021 12:00)  T(F): 100.8 (2021 10:00), Max: 101.3 (2021 12:00)  HR: 65 (2021 10:00) (61 - 69)  BP: --  BP(mean): --  ABP: 105/67 (2021 10:00) (90/54 - 113/64)  ABP(mean): 77 (2021 10:00) (63 - 77)  RR: 20 (2021 10:00) (19 - 35)  SpO2: 99% (2021 10:00) (96% - 100%)       @ 07:  -   @ 07:00  --------------------------------------------------------  IN: 2693.9 mL / OUT: 1415 mL / NET: 1278.9 mL     @ 07:  -   @ 10:49  --------------------------------------------------------  IN: 420.8 mL / OUT: 115 mL / NET: 305.8 mL  PHYSICAL EXAM:Daily   Elderly male intubated sedated  on full ventilatory support /40%20/5cm PEEP  0n vasopressin   Daily Weight in k.4 (2021 00:00)  HEENT:     + NCAT  + EOMI  - Conjuctival edema   - Icterus   - Thrush   - ETT  + NGT/OGT  Neck:         + FROM   LT  IJ  JVD     - Nodes     - Masses    + Mid-line trachea   - Tracheostomy  Chest: normal findings  Lungs:          + CTA   + Rhonchi    - Rales    - Wheezing     - Decreased BS   - Dullness R L  Cardiac:       + S1 + S2    + RRR   - Irregular   - S3  - S4    - Murmurs   - Rub   - Hamman’s sign   Abdomen:    + BS     + Soft    + Non-tender     - Distended    - Organomegaly  - PEG  Extremities:   - Cyanosis U/L   - Clubbing  U/L  + LE/UE Edema   + Capillary refill    + Pulses   Neuro:        - Awake   -  Alert   - Confused   - Lethargic   + Sedated  + Generalized Weakness  Skin:        - Rashes    - Erythema   + Normal incisions   + IV sites intact          HOSPITAL MEDICATIONS: All mediciations reviewed and analyzed  MEDICATIONS  (STANDING):  aMIOdarone    Tablet 200 milliGRAM(s) Oral daily  chlorhexidine 0.12% Liquid 15 milliLiter(s) Oral Mucosa every 12 hours  chlorhexidine 2% Cloths 1 Application(s) Topical <User Schedule>  dexMEDEtomidine Infusion 0.5 MICROgram(s)/kG/Hr (9.81 mL/Hr) IV Continuous <Continuous>  dextrose 50% Injectable 50 milliLiter(s) IV Push every 15 minutes  heparin  Infusion 400 Unit(s)/Hr (12.5 mL/Hr) IV Continuous <Continuous>  HYDROmorphone  Injectable 0.5 milliGRAM(s) IV Push once  insulin lispro (ADMELOG) corrective regimen sliding scale   SubCutaneous every 6 hours  pantoprazole  Injectable 40 milliGRAM(s) IV Push every 12 hours  piperacillin/tazobactam IVPB.. 3.375 Gram(s) IV Intermittent every 8 hours  propofol Infusion 20 MICROgram(s)/kG/Min (9.42 mL/Hr) IV Continuous <Continuous>  sodium chloride 0.9% lock flush 3 milliLiter(s) IV Push every 8 hours  sodium chloride 0.9%. 1000 milliLiter(s) (10 mL/Hr) IV Continuous <Continuous>    MEDICATIONS  (PRN):  acetaminophen    Suspension .. 650 milliGRAM(s) Oral every 6 hours PRN Temp greater or equal to 38C (100.4F)    LABS: All Lab data reviewed and analyzed                         )-----------( 177      ( 2021 00:22 )             27.2   06-    135  |  105  |  10  ----------------------------<  203<H>  4.0   |  16<L>  |  0.55    Ca    9.2      2021 00:21  Phos  1.6     06-22  Mg     1.9     -    TPro  7.1  /  Alb  2.9<L>  /  TBili  0.8  /  DBili  x   /  AST  71<H>  /  ALT  53<H>  /  AlkPhos  310<H>        Ca    9.0      2021 23:31  Phos  2.0     06-20  Mg     2.0     -    TPro  6.8  /  Alb  3.3  /  TBili  0.8  /  DBili  x   /  AST  73<H>  /  ALT  37  /  AlkPhos  310<H>        Ca    8.8      2021 00:42  Phos  1.6     06-20  Mg     2.0     -    TPro  6.7  /  Alb  3.4  /  TBili  0.8  /  DBili  x   /  AST  24  /  ALT  15  /  AlkPhos  213<H>      PT/INR - ( 2021 23:16 )   PT: 15.3 sec;   INR: 1.29 ratio         PTT - ( 2021 04:52 )  PTT:45.2 sec LIVER FUNCTIONS - ( 2021 00:42 )  Alb: 3.4 g/dL / Pro: 6.7 g/dL / ALK PHOS: 213 U/L / ALT: 15 U/L / AST: 24 U/L / GGT: x           RADIOLOGY: - Reviewed and analyzed LLL  pneumonia , LVAD HM2

## 2021-06-23 NOTE — PROGRESS NOTE ADULT - PROBLEM SELECTOR PLAN 2
-Appr ID recs  -Cdiff+  -Contact precautions  -C/w vanco PO and metronidazole for tx  -CT A/P w/o e/o toxic megacolon

## 2021-06-23 NOTE — PROGRESS NOTE ADULT - ASSESSMENT
imp/rx:  Possible aspiration pneumonitis at time of endoscopic procedure  Completed a course of therapy with Zosyn      Recent course complicated by C.diff diarrhea    Abdomen somewhat distended- CT scan 6/22 shows fluid filled colon but is not suggestive of toxic nita colon at this point  Agree with Vanco oral 500mg qid  Continue IV flagyl 500mg q 8hr  Would consider repeating abdominal imaging by X-ray in AM 6/24       Morris Prater MD  [phone]  After 5pm/weekends [phone]

## 2021-06-23 NOTE — CONSULT NOTE ADULT - SUBJECTIVE AND OBJECTIVE BOX
CC: Chi    HPI: 65M PMH ACC/AHA stage D HF due to NICM HM2 LVAD , TV annuloplasty ring 9/12/17 as destination therapy due to severe peripheral artery disease with significant stenosis  SIADH, Depression, CKD-3 with hyperkalemia, past E. coli UTIs, driveline drainage (1/7/21) and COVID-19 (back in April 2020), admitted for weakness and fatigue. Pt was found to have CAD S/P CABG on 6/18. Pt failed weaning trials. As per CTU, pt is not a candidate for extubation and needs a tracheostomy. Pt is currently on dobutamine/vasopressin. Pt is on zosyn for pneumonia.        PAST MEDICAL & SURGICAL HISTORY:  CHF (congestive heart failure)    CAD (coronary artery disease)    Depression    Pleural effusion    History of 2019 novel coronavirus disease (COVID-19)  april 2020    Hemorrhoids    Bleeding hemorrhoids    Peripheral arterial disease    Claudication    BPH with urinary obstruction    ACC/AHA stage D systolic heart failure    Anticoagulation goal of INR 2.0 to 2.5    Falls    Clavicle fracture    CKD (chronic kidney disease), stage III    Iron deficiency anemia    H/O epistaxis    Vertigo    GI bleed    S/P TVR (tricuspid valve repair)    S/P ventricular assist device    S/P endoscopy      Allergies    No Known Allergies    Intolerances      MEDICATIONS  (STANDING):  aMIOdarone    Tablet 200 milliGRAM(s) Oral daily  chlorhexidine 0.12% Liquid 15 milliLiter(s) Oral Mucosa every 12 hours  chlorhexidine 2% Cloths 1 Application(s) Topical <User Schedule>  clonazePAM  Tablet 1 milliGRAM(s) Oral every 8 hours  dexMEDEtomidine Infusion 0.5 MICROgram(s)/kG/Hr (9.81 mL/Hr) IV Continuous <Continuous>  heparin  Infusion 400 Unit(s)/Hr (11.5 mL/Hr) IV Continuous <Continuous>  insulin lispro (ADMELOG) corrective regimen sliding scale   SubCutaneous every 6 hours  ketamine Infusion. 0.501 mG/kG/Hr (3.93 mL/Hr) IV Continuous <Continuous>  metoclopramide Injectable 10 milliGRAM(s) IV Push every 8 hours  metroNIDAZOLE  IVPB      metroNIDAZOLE  IVPB 500 milliGRAM(s) IV Intermittent every 6 hours  pantoprazole  Injectable 40 milliGRAM(s) IV Push every 12 hours  sodium chloride 0.9%. 1000 milliLiter(s) (10 mL/Hr) IV Continuous <Continuous>  vancomycin    Solution 500 milliGRAM(s) Oral every 6 hours    MEDICATIONS  (PRN):  acetaminophen    Suspension .. 650 milliGRAM(s) Oral every 6 hours PRN Temp greater or equal to 38C (100.4F)      Social History: No tobacco use    Family history: no pertinent FHx    ROS:   ENT: all negative except as noted in HPI   CV: denies palpitations  Pulm: denies SOB, cough, hemoptysis  GI: denies change in apetite, indigestion, n/v  : denies pertinent urinary symptoms, urgency  Neuro: denies numbness/tingling, loss of sensation  Psych: denies anxiety  MS: denies muscle weakness, instability  Heme: denies easy bruising or bleeding  Endo: denies heat/cold intolerance, excessive sweating  Vascular: denies LE edema    Vital Signs Last 24 Hrs  T(C): 36.8 (23 Jun 2021 08:00), Max: 37.6 (22 Jun 2021 20:00)  T(F): 98.2 (23 Jun 2021 08:00), Max: 99.7 (22 Jun 2021 20:00)  HR: 65 (23 Jun 2021 11:00) (50 - 78)  BP: --  BP(mean): 70 (22 Jun 2021 20:00) (70 - 70)  RR: 20 (23 Jun 2021 11:00) (0 - 43)  SpO2: 100% (23 Jun 2021 11:00) (94% - 100%)                          8.2    15.52 )-----------( 200      ( 23 Jun 2021 08:44 )             25.8    06-23    139  |  108  |  9   ----------------------------<  113<H>  4.2   |  17<L>  |  0.57    Ca    9.2      23 Jun 2021 01:00  Phos  2.2     06-23  Mg     2.1     06-23    TPro  6.9  /  Alb  3.3  /  TBili  0.8  /  DBili  x   /  AST  142<H>  /  ALT  87<H>  /  AlkPhos  244<H>  06-23   PT/INR - ( 22 Jun 2021 00:22 )   PT: 14.0 sec;   INR: 1.18 ratio         PTT - ( 22 Jun 2021 20:38 )  PTT:42.6 sec    PHYSICAL EXAM:  Gen: NAD  Skin: No rashes, bruises, or lesions  Head: Normocephalic, Atraumatic  Face: no edema, erythema, or fluctuance. Parotid glands soft without mass  Eyes: no scleral injection  Nose: Nares bilaterally patent, no discharge  Mouth: ET tube in place, no tongue swelling, No Stridor / Drooling / Trismus.  Mucosa moist, tongue/uvula midline, oropharynx clear  Neck: Flat, supple, no lymphadenopathy, trachea midline, no masses  Lymphatic: No lymphadenopathy  Resp: breathing easily, no stridor  CV: no peripheral edema/cyanosis  GI: nondistended   Peripheral vascular: no JVD or edema  Neuro: facial nerve intact, no facial droop

## 2021-06-23 NOTE — PROGRESS NOTE ADULT - SUBJECTIVE AND OBJECTIVE BOX
INFECTIOUS DISEASES FOLLOW UP-- Anitra Prater  [phone]    This is a follow up note for this  66yMale with  Anemia, low grade fevers persist  remains intubated and mostly sedated        ROS:  CONSTITUTIONAL: intubated, sedated    Allergies    No Known Allergies    Intolerances        ANTIBIOTICS/RELEVANT:  antimicrobials  metroNIDAZOLE  IVPB      metroNIDAZOLE  IVPB 500 milliGRAM(s) IV Intermittent every 6 hours  vancomycin    Solution 500 milliGRAM(s) Oral every 6 hours    immunologic:    OTHER:  acetaminophen    Suspension .. 650 milliGRAM(s) Oral every 6 hours PRN  aMIOdarone    Tablet 200 milliGRAM(s) Oral daily  chlorhexidine 0.12% Liquid 15 milliLiter(s) Oral Mucosa every 12 hours  chlorhexidine 2% Cloths 1 Application(s) Topical <User Schedule>  clonazePAM  Tablet 1 milliGRAM(s) Oral every 8 hours  dexMEDEtomidine Infusion 0.5 MICROgram(s)/kG/Hr IV Continuous <Continuous>  heparin  Infusion 400 Unit(s)/Hr IV Continuous <Continuous>  insulin lispro (ADMELOG) corrective regimen sliding scale   SubCutaneous every 6 hours  ketamine Infusion. 0.501 mG/kG/Hr IV Continuous <Continuous>  metoclopramide Injectable 10 milliGRAM(s) IV Push every 8 hours  pantoprazole  Injectable 40 milliGRAM(s) IV Push every 12 hours  sodium chloride 0.9%. 1000 milliLiter(s) IV Continuous <Continuous>      Objective:  Vital Signs Last 24 Hrs  T(C): 37.5 (23 Jun 2021 16:00), Max: 37.6 (22 Jun 2021 20:00)  T(F): 99.5 (23 Jun 2021 16:00), Max: 99.7 (22 Jun 2021 20:00)  HR: 60 (23 Jun 2021 19:00) (51 - 78)  BP: --  BP(mean): 70 (22 Jun 2021 20:00) (70 - 70)  RR: 22 (23 Jun 2021 19:00) (0 - 43)  SpO2: 99% (23 Jun 2021 19:00) (95% - 100%)    PHYSICAL EXAM:  Constitutional:no acute distress  Eyes:ALONSO,  Ear/Nose/Throat: no oral lesions, thin secretions from Et tube	  Respiratory: audible bilat decreased at both bases  Cardiovascular: S1S2 VAD sounds  Gastrointestinal: remains distended but relatively soft  driveline exit site dressing intact  Extremities:no e/e/c  No Lymphadenopathy  IV sites not inflammed.    LABS:                        8.0    15.60 )-----------( 210      ( 23 Jun 2021 18:18 )             26.3     06-23    139  |  108  |  9   ----------------------------<  113<H>  4.2   |  17<L>  |  0.57    Ca    9.2      23 Jun 2021 01:00  Phos  2.2     06-23  Mg     2.1     06-23    TPro  6.9  /  Alb  3.3  /  TBili  0.8  /  DBili  x   /  AST  142<H>  /  ALT  87<H>  /  AlkPhos  244<H>  06-23    PT/INR - ( 22 Jun 2021 00:22 )   PT: 14.0 sec;   INR: 1.18 ratio         PTT - ( 23 Jun 2021 12:16 )  PTT:45.6 sec      MICROBIOLOGY:            RECENT CULTURES:  06-23 @ 12:38  .Sputum Sputum trap  --  --  --  --  --  06-20 @ 20:56  .Sputum Sputum  --  --  --    Normal Respiratory Bria present  --  06-20 @ 16:31  .Blood Blood-Peripheral  Blood Culture PCR  Blood Culture PCR  PCR    No growth to date.  --  06-19 @ 00:21  .Blood Blood-Peripheral  --  --  --    No growth to date.  --  06-18 @ 08:23  .Blood Blood  --  --  --    No Growth Final  --  06-17 @ 17:18  Bronch Wash Bronchoalveolar Lavage  --  --  --    No growth at 48 hours  --      RADIOLOGY & ADDITIONAL STUDIES:    < from: Xray Chest 1 View- PORTABLE-Routine (Xray Chest 1 View- PORTABLE-Routine in AM.) (06.23.21 @ 01:35) >  IMPRESSION:    The heart is slightly enlarged. Left lower lobe pneumonia. The right lung is clear. All life supporting devices are in good position and unchanged when compared to previous study done on June 22, 2021 at 2:03 AM.    < end of copied text >    < from: CT Abdomen and Pelvis w/ IV Cont (06.22.21 @ 10:44) >  IMPRESSION:  Fluid-filled colon which may be secondary to rapid transit.    < end of copied text >

## 2021-06-23 NOTE — CHART NOTE - NSCHARTNOTEFT_GEN_A_CORE
Chart notes reviewed from admission to current time, the following was noted.  1.  After admission patient underwent EGD procedure on 6/15/21 and post procedure developed aspiration pneumonia and subsequently treated with IV Zosyn.  No evidence of sepsis noted on admission or subsequent blood cultures.  Patient subsequently on 6/20/21 was diagnosed with clostridium difficile colitis.  2.  Anemia on admission secondary to lower GI bleed noted in the ileum on capsule study.  3.  Esophageal tear has been ruled out on CT Scan 6/20/21.

## 2021-06-23 NOTE — PROGRESS NOTE ADULT - ASSESSMENT
64 YO M with a history of stage D NICM s/p HM2 on 9/2017 as DT (due to severe PAD) with TV ring, prior COVID-19 infection 4/2020, recurrent syncope post LVAD s/p ILR, and chronic abdominal pain with prior negative workup who was admitted with symptomatic anemia with Hgb 4.5 in setting of INR 8.8 without hemodynamic instability. He was transfused and underwent VCE which showed active bleeding in the mid small bowel but subsequent enteroscopy 6/15 did not reveal any active bleeding. He acutely decompensated after procedure with fever/hypertension, low flow alarms, and pulmonary infiltrate with hypoxia requiring intubation from probable aspiration though TRALI/TACO also on the differential. Reassuringly his cultures have been negative. His LDH is normal and there are no signs of overt LVAD dysfunction on echo though signs of insufficiently unloaded ventricle for which we increased speed on 6/19.    He remains on ventilator although CXR is improving. He has also tested positive for Cdiff and is currently receiving tx for this infxn. There was concern for ileus/toxic megacolon on KUB, however CT A/P w/o e/o this; however, it did show possibly worsening PNA.

## 2021-06-23 NOTE — PROGRESS NOTE ADULT - PROBLEM SELECTOR PLAN 5
- Increased speed to 9000 RPM to better unload ventricle. Would repeat TTE when extubated  - Slightly VOL on exam today, would dose furosemide 20mg tab via NGT x 1  - c/w heparin but would target lower PTT goal at this time. Eventual resumption of warfarin   - Will plan to hold aspirin indefinitely  - Continue to monitor LDH daily.

## 2021-06-23 NOTE — PROGRESS NOTE ADULT - PROBLEM SELECTOR PLAN 1
- wean ventilator as tolerates  - sedation vacation and wean as per CTU  - c/w tx PNA, f/u ID input given CT chest findings  - suspect will need vent for prolonged period of time, would get ENT c/s for trach placement

## 2021-06-23 NOTE — CONSULT NOTE ADULT - ASSESSMENT
65yo Male with significant PMHx S/P CABG, with respiratory failure. Family agreed to tracheostomy. 67yo Male with significant PMHx S/P CABG, with respiratory failure.   Risks, benefits and alternatives of tracheostomy discussed with family including but not limited to bleeding possibly massive to injury to great vessels, infection, injury to lungs, esophagus, difficulty swallowing, inability to speak, scarring in the trachea that can obstruct the trachea, airway compromise, mucous plugging/ need for very close tracheal care as well as surgery in a critically ill patient. They elected to proceed

## 2021-06-23 NOTE — PROGRESS NOTE ADULT - ASSESSMENT
Assessment and Recommendation:   · Assessment	  Assessment and recommendation :  Recurrent Acute hypoxic respiratory Failure intubated on full ventilatory support   Acute left lower lobe pneumonia  possible Aspiration pneumonia   Diarrhea +ve for C-diff. colitis on PO vancomycin and IV Flagyl   CT scan of abdomen negative for toxic megacolon    Non ischemic cardiomyopathy continue ACE inhibitor and B-Blockers   Septic shock and cardiogenic shock   Stage D systolic heart failure S/P LVAD HM2   MH2 LVAD  with  TV Annuloplasty  Severe peripheral vascular disease   Anion gap metabolic acidosis  severe hyperglycemia on insulin coverage    On  Amiodarone and IV heparin   critical care polyneuropathy   Anemia of Acute blood Loss   S/P Small bowel bleeding   S/P blood and FFP transfusion   Chronic kidney disease stage III  Continue NGT feeding   GI prophylaxis  discuss with TCV surgeon   critical care time spend is 35 minutes

## 2021-06-23 NOTE — PROGRESS NOTE ADULT - SUBJECTIVE AND OBJECTIVE BOX
CRITICAL CARE ATTENDING - CTICU    MEDICATIONS  (STANDING):  aMIOdarone    Tablet 200 milliGRAM(s) Oral daily  chlorhexidine 0.12% Liquid 15 milliLiter(s) Oral Mucosa every 12 hours  chlorhexidine 2% Cloths 1 Application(s) Topical <User Schedule>  clonazePAM  Tablet 1 milliGRAM(s) Oral every 8 hours  dexMEDEtomidine Infusion 0.5 MICROgram(s)/kG/Hr (9.81 mL/Hr) IV Continuous <Continuous>  heparin  Infusion 400 Unit(s)/Hr (11.5 mL/Hr) IV Continuous <Continuous>  insulin lispro (ADMELOG) corrective regimen sliding scale   SubCutaneous every 6 hours  ketamine Infusion. 0.5 mG/kG/Hr (3.93 mL/Hr) IV Continuous <Continuous>  metoclopramide Injectable 10 milliGRAM(s) IV Push every 8 hours  metroNIDAZOLE  IVPB      metroNIDAZOLE  IVPB 500 milliGRAM(s) IV Intermittent every 6 hours  pantoprazole  Injectable 40 milliGRAM(s) IV Push every 12 hours  propofol Infusion 20 MICROgram(s)/kG/Min (9.42 mL/Hr) IV Continuous <Continuous>  sodium chloride 0.9%. 1000 milliLiter(s) (10 mL/Hr) IV Continuous <Continuous>  vancomycin    Solution 500 milliGRAM(s) Oral every 6 hours                                    7.0    15.72 )-----------( 200      ( 2021 01:00 )             23.0       06-    139  |  108  |  9   ----------------------------<  113<H>  4.2   |  17<L>  |  0.57    Ca    9.2      2021 01:00  Phos  2.2     -  Mg     2.1         TPro  6.9  /  Alb  3.3  /  TBili  0.8  /  DBili  x   /  AST  142<H>  /  ALT  87<H>  /  AlkPhos  244<H>        PT/INR - ( 2021 00:22 )   PT: 14.0 sec;   INR: 1.18 ratio         PTT - ( 2021 20:38 )  PTT:42.6 sec    Mode: AC/ CMV (Assist Control/ Continuous Mandatory Ventilation)  RR (machine): 20  TV (machine): 500  FiO2: 40  PEEP: 5  ITime: 0.9  MAP: 11  PIP: 27      Daily     Daily Weight in k.7 (2021 00:00)       @ 07:01  -   @ 07:00  --------------------------------------------------------  IN: 3717.7 mL / OUT: 2925 mL / NET: 792.7 mL        Critically Ill patient  : [ ] preoperative ,   [ ] post operative  [x] Non operative     Requires :  [x] Arterial Line   [x] Central Line  [ ] PA catheter  [ ] IABP  [ ] ECMO  [x] LVAD  [x] Ventilator  [ ] pacemaker [ ] Impella.                      [x] ABG's     [x] Pulse Oxymetry Monitoring  Bedside evaluation , monitoring , treatment of hemodynamics , fluids , IVP/ IVCD meds.        Diagnosis:     Admitted to CTU on  for melena with anemia     LVAD patient     Ventilator Management:  [x]AC-rest    []CPAP-PS Wean    [ ]Trach Collar     [ ]Extubate    [ ] T-Piece  [ ]peep>5     IV Precedex and Propofol for vent synchrony     Acute blood loss Anemia     Hypotension     Hypovolemia     Respiratory Failure - ARDS     Lung Protective Management     Melena     Metabolic Acidosis     Septic shock     Sepsis - L Pneumonia/ pneumonitis     Requires chest PT, pulmonary toilet, ambu bagging, suctioning to maintain SaO2,  patent airway and treat atelectasis.    Requires bedside physical therapy, mobilization and total group home care.    Tolerates NG / NJ feeds at [x] goal rate    [ ] trophic rate    [ ]       rate                 By signing my name below, I, Rojas Junior, attest that this documentation has been prepared under the direction and in the presence of Luis Fernando Thompson MD.   Electronically Signed: Cesia Cohen 21 @ 07:29      Discussed with CT surgeon, Physician Assistant - Nurse Practitioner- Critical care medicine team.   Dicussed at  AM / PM rounds.   Chart, labs , films reviewed.    Cumulative Critical Care Time Given Today:  CRITICAL CARE ATTENDING - CTICU    MEDICATIONS  (STANDING):  aMIOdarone    Tablet 200 milliGRAM(s) Oral daily  chlorhexidine 0.12% Liquid 15 milliLiter(s) Oral Mucosa every 12 hours  chlorhexidine 2% Cloths 1 Application(s) Topical <User Schedule>  clonazePAM  Tablet 1 milliGRAM(s) Oral every 8 hours  dexMEDEtomidine Infusion 0.5 MICROgram(s)/kG/Hr (9.81 mL/Hr) IV Continuous <Continuous>  heparin  Infusion 400 Unit(s)/Hr (11.5 mL/Hr) IV Continuous <Continuous>  insulin lispro (ADMELOG) corrective regimen sliding scale   SubCutaneous every 6 hours  ketamine Infusion. 0.5 mG/kG/Hr (3.93 mL/Hr) IV Continuous <Continuous>  metoclopramide Injectable 10 milliGRAM(s) IV Push every 8 hours  metroNIDAZOLE  IVPB      metroNIDAZOLE  IVPB 500 milliGRAM(s) IV Intermittent every 6 hours  pantoprazole  Injectable 40 milliGRAM(s) IV Push every 12 hours  propofol Infusion 20 MICROgram(s)/kG/Min (9.42 mL/Hr) IV Continuous <Continuous>  sodium chloride 0.9%. 1000 milliLiter(s) (10 mL/Hr) IV Continuous <Continuous>  vancomycin    Solution 500 milliGRAM(s) Oral every 6 hours                                    7.0    15.72 )-----------( 200      ( 2021 01:00 )             23.0       06-    139  |  108  |  9   ----------------------------<  113<H>  4.2   |  17<L>  |  0.57    Ca    9.2      2021 01:00  Phos  2.2     -  Mg     2.1         TPro  6.9  /  Alb  3.3  /  TBili  0.8  /  DBili  x   /  AST  142<H>  /  ALT  87<H>  /  AlkPhos  244<H>        PT/INR - ( 2021 00:22 )   PT: 14.0 sec;   INR: 1.18 ratio         PTT - ( 2021 20:38 )  PTT:42.6 sec    Mode: AC/ CMV (Assist Control/ Continuous Mandatory Ventilation)  RR (machine): 20  TV (machine): 500  FiO2: 40  PEEP: 5  ITime: 0.9  MAP: 11  PIP: 27      Daily     Daily Weight in k.7 (2021 00:00)       @ 07:01  -   @ 07:00  --------------------------------------------------------  IN: 3717.7 mL / OUT: 2925 mL / NET: 792.7 mL        Critically Ill patient  : [ ] preoperative ,   [ ] post operative  [x] Non operative     Requires :  [x] Arterial Line   [x] Central Line  [ ] PA catheter  [ ] IABP  [ ] ECMO  [x] LVAD  [x] Ventilator  [ ] pacemaker [ ] Impella.                      [x] ABG's     [x] Pulse Oxymetry Monitoring  Bedside evaluation , monitoring , treatment of hemodynamics , fluids , IVP/ IVCD meds.        Diagnosis:     Admitted to CTU on  for melena with anemia     LVAD patient     Ventilator Management:  [x]AC-rest    []CPAP-PS Wean    [ ]Trach Collar     [ ]Extubate    [ ] T-Piece  [ ]peep>5     IV Precedex and Propofol for vent synchrony / agitation / delirium     Acute blood loss Anemia     Hypotension     Hypovolemia     Respiratory Failure - ARDS - resolving    Melena     Septic shock  - resolved    Sepsis - L Pneumonia/ pneumonitis     Requires chest PT, pulmonary toilet, ambu bagging, suctioning to maintain SaO2,  patent airway and treat atelectasis.    Requires bedside physical therapy, mobilization and total jail care.    Tolerates NG / NJ feeds at [x] goal rate    [ ] trophic rate    [ ]       rate                 By signing my name below, I, Rojas Junior, attest that this documentation has been prepared under the direction and in the presence of Luis Fernando Thompson MD.   Electronically Signed: Cesia Cohen 21 @ 07:29    ILuis Fernando, personally performed the services described in this documentation. All medical record entries made by the scribe were at my direction and in my presence. I have reviewed the chart and agree that the record reflects my personal performance and is accurate and complete.   Luis Fernando Thompson MD.       Discussed with CT surgeon, Physician Assistant - Nurse Practitioner- Critical care medicine team.   Dicussed at  AM / PM rounds.   Chart, labs , films reviewed.    Cumulative Critical Care Time Given Today:  30 min

## 2021-06-23 NOTE — PROGRESS NOTE ADULT - SUBJECTIVE AND OBJECTIVE BOX
RICKY JOINT  MRN#: [id number]  Subjective:  Pulmonary progress  : recurrent Acute hypoxic respiratory Failure ,aspiration pneumonia, NICM  , chart reviewed and H/O obtained radiological and Laboratory study reviewed patient Examined     65M PMH ACC/AHA stage D HF due to NICM HM2 LVAD , TV annuloplasty ring 17 as destination therapy due to severe peripheral artery disease with significant stenosis  SIADH, Depression, CKD-3 with hyperkalemia, past E. coli UTIs, driveline drainage (21) and COVID-19 (back in 2020)  He was recently seen in clinic where he complained of abdominal pain and dark stools w constipation back in May. He [facility] ER today weakness and fatigue, moderate and + Black stools for three days, on coumadin secondary to warfarin use in the setting of an LVAD. Patient has required transfusions for GIB in the past. Mostly recently back in 2021 pt had anemia with dark stools. No interventions was done at that time. However Last Endoscopy was done in 2020 (negative). Today labs show patient is anemic with H/H of 4.5/16.3,. INR is 8.84 MAP in the 90s, Temp 35.1. He denies any chest pain, shortness of breath, dizziness, abd pain, nausea or vomiting. found to have  rectal bleeding underwent endoscopy ,old blood in the proximal ileum ,  develop sepsis with LL opacity given Antibiotics , Extubated , reintubated , Bronchoscopy on Zosyn for LL pneumonia  and Amiodarone S/P TV Annuloplasty , patient remain intubated on full ventilatory support .S/P multiple units of blood transfusion , remain on full ventilatory support on Precedex and propofol , new central IJ line , diarrhea C diff. +ve on po vancomycin and IV Flagyl,  mildly distended belly ,  CT Abdomen and Pelvis w/ IV Cont (21 @ 10:44) >  IMPRESSION:  Fluid-filled colon which may be secondary to rapid transit.    Small bilateral pleural effusions with associated compressive atelectasis..    New left upper and lower lobe parenchymal opacities, suspicious for pneumonia.    Moderate stenosis in the proximal superior mesenteric artery         (2021 16:57)    PAST MEDICAL & SURGICAL HISTORY:  CHF (congestive heart failure)    CAD (coronary artery disease)  Depression    Pleural effusion    History of 2019 novel coronavirus disease (COVID-19)  2020    Hemorrhoids    Bleeding hemorrhoids    Peripheral arterial disease    Claudication    BPH with urinary obstruction    ACC/AHA stage D systolic heart failure  Anticoagulation goal of INR 2.0 to 2.5    Falls    Clavicle fracture    CKD (chronic kidney disease), stage III    Iron deficiency anemia    H/O epistaxis    Vertigo    GI bleed    S/P TVR (tricuspid valve repair)    S/P ventricular assist device    S/P endoscopy    OBJECTIVE:  ICU Vital Signs Last 24 Hrs  T(C): 38.2 (2021 10:00), Max: 38.5 (2021 12:00)  T(F): 100.8 (2021 10:00), Max: 101.3 (2021 12:00)  HR: 65 (2021 10:00) (61 - 69)  BP: --  BP(mean): --  ABP: 105/67 (2021 10:00) (90/54 - 113/64)  ABP(mean): 77 (2021 10:00) (63 - 77)  RR: 20 (2021 10:00) (19 - 35)  SpO2: 99% (2021 10:00) (96% - 100%)       @ : @ 07:00  --------------------------------------------------------  IN: 2693.9 mL / OUT: 1415 mL / NET: 1278.9 mL     @ 07: @ 10:49  --------------------------------------------------------  IN: 420.8 mL / OUT: 115 mL / NET: 305.8 mL  PHYSICAL EXAM:Daily   Elderly male intubated sedated  on full ventilatory support /40%20/5cm PEEP  0n vasopressin   Daily Weight in k.4 (2021 00:00)  HEENT:     + NCAT  + EOMI  - Conjuctival edema   - Icterus   - Thrush   - ETT  + NGT/OGT  Neck:         + FROM   LT  IJ  JVD     - Nodes     - Masses    + Mid-line trachea   - Tracheostomy  Chest: normal findings  Lungs:          + CTA   + Rhonchi    - Rales    - Wheezing     - Decreased BS   - Dullness R L  Cardiac:       + S1 + S2    + RRR   - Irregular   - S3  - S4    - Murmurs   - Rub   - Hamman’s sign   Abdomen:    + BS     + Soft    + Non-tender     - Distended    - Organomegaly  - PEG  Extremities:   - Cyanosis U/L   - Clubbing  U/L  + LE/UE Edema   + Capillary refill    + Pulses   Neuro:        - Awake   -  Alert   - Confused   - Lethargic   + Sedated  + Generalized Weakness  Skin:        - Rashes    - Erythema   + Normal incisions   + IV sites intact          HOSPITAL MEDICATIONS: All mediciations reviewed and analyzed  MEDICATIONS  (STANDING):  aMIOdarone    Tablet 200 milliGRAM(s) Oral daily  chlorhexidine 0.12% Liquid 15 milliLiter(s) Oral Mucosa every 12 hours  chlorhexidine 2% Cloths 1 Application(s) Topical <User Schedule>  dexMEDEtomidine Infusion 0.5 MICROgram(s)/kG/Hr (9.81 mL/Hr) IV Continuous <Continuous>  dextrose 50% Injectable 50 milliLiter(s) IV Push every 15 minutes  heparin  Infusion 400 Unit(s)/Hr (12.5 mL/Hr) IV Continuous <Continuous>  HYDROmorphone  Injectable 0.5 milliGRAM(s) IV Push once  insulin lispro (ADMELOG) corrective regimen sliding scale   SubCutaneous every 6 hours  pantoprazole  Injectable 40 milliGRAM(s) IV Push every 12 hours  piperacillin/tazobactam IVPB.. 3.375 Gram(s) IV Intermittent every 8 hours  propofol Infusion 20 MICROgram(s)/kG/Min (9.42 mL/Hr) IV Continuous <Continuous>  sodium chloride 0.9% lock flush 3 milliLiter(s) IV Push every 8 hours  sodium chloride 0.9%. 1000 milliLiter(s) (10 mL/Hr) IV Continuous <Continuous>    MEDICATIONS  (PRN):  acetaminophen    Suspension .. 650 milliGRAM(s) Oral every 6 hours PRN Temp greater or equal to 38C (100.4F)    LABS: All Lab data reviewed and analyzed                        8.2    15.52 )-----------( 200      ( 2021 08:44 )             25.8   -    139  |  108  |  9   ----------------------------<  113<H>  4.2   |  17<L>  |  0.57    Ca    9.2      2021 01:00  Phos  2.2     -  Mg     2.1         TPro  6.9  /  Alb  3.3  /  TBili  0.8  /  DBili  x   /  AST  142<H>  /  ALT  87<H>  /  AlkPhos  244<H>      Ca    9.2      2021 00:21  Phos  1.6     06-  Mg     1.9         TPro  7.1  /  Alb  2.9<L>  /  TBili  0.8  /  DBili  x   /  AST  71<H>  /  ALT  53<H>  /  AlkPhos  310<H>        Ca    9.0      2021 23:31  Phos  2.0     -20  Mg     2.0         TPro  6.8  /  Alb  3.3  /  TBili  0.8  /  DBili  x   /  AST  73<H>  /  ALT  37  /  AlkPhos  310<H>             PTT - ( 2021 04:52 )  PTT:45.2 sec LIVER FUNCTIONS - ( 2021 00:42 )  Alb: 3.4 g/dL / Pro: 6.7 g/dL / ALK PHOS: 213 U/L / ALT: 15 U/L / AST: 24 U/L / GGT: x           RADIOLOGY: - Reviewed and analyzed LLL  pneumonia , LVAD HM2, CT scan of abdomen reviewed result noted

## 2021-06-23 NOTE — PROGRESS NOTE ADULT - PROBLEM SELECTOR PLAN 4
- no further active blood loss since correction of INR and no active bleeding seen on enteroscopy  - continue to trend CBC closely as anticoagulation re-initiated; some decline overnight, would c/w monitor for bleeding and trend CBC closely  - c/w pantop IV bid

## 2021-06-23 NOTE — PROGRESS NOTE ADULT - SUBJECTIVE AND OBJECTIVE BOX
Patient seen and examined at bedside.    Overnight Events: NAEO. This AM, pt remains intubated/sedated. CT A/P w/o e/o toxic megacolon.    Review Of Systems: Unable to obtain.    Current Meds:  acetaminophen    Suspension .. 650 milliGRAM(s) Oral every 6 hours PRN  aMIOdarone    Tablet 200 milliGRAM(s) Oral daily  chlorhexidine 0.12% Liquid 15 milliLiter(s) Oral Mucosa every 12 hours  chlorhexidine 2% Cloths 1 Application(s) Topical <User Schedule>  clonazePAM  Tablet 1 milliGRAM(s) Oral every 8 hours  dexMEDEtomidine Infusion 0.5 MICROgram(s)/kG/Hr IV Continuous <Continuous>  heparin  Infusion 400 Unit(s)/Hr IV Continuous <Continuous>  insulin lispro (ADMELOG) corrective regimen sliding scale   SubCutaneous every 6 hours  ketamine Infusion. 0.501 mG/kG/Hr IV Continuous <Continuous>  metoclopramide Injectable 10 milliGRAM(s) IV Push every 8 hours  metroNIDAZOLE  IVPB      metroNIDAZOLE  IVPB 500 milliGRAM(s) IV Intermittent every 6 hours  pantoprazole  Injectable 40 milliGRAM(s) IV Push every 12 hours  sodium chloride 0.9%. 1000 milliLiter(s) IV Continuous <Continuous>  vancomycin    Solution 500 milliGRAM(s) Oral every 6 hours      Vitals:  T(F): 99.3 (06-23), Max: 99.7 (06-22)  HR: 61 (06-23) (50 - 78)  BP: --  RR: 21 (06-23)  SpO2: 100% (06-23)  I&O's Summary    22 Jun 2021 07:01 - 23 Jun 2021 07:00  --------------------------------------------------------  IN: 3857.9 mL / OUT: 3000 mL / NET: 857.9 mL    23 Jun 2021 07:01  -  23 Jun 2021 12:15  --------------------------------------------------------  IN: 462.7 mL / OUT: 310 mL / NET: 152.7 mL        Physical Exam:  Gen: Critically ill pt.  HEENT: NCAT. ETT in place.  Neck: No JVP elev.  CV: Normal S1, S2. RRR. No MRG.  Chest: CTAB. No WRR.  Abd: +BSx4. Soft. Distended but soft compressible.  Ext: No LE edema.  Skin: No cyanosis.                          8.2    15.52 )-----------( 200      ( 23 Jun 2021 08:44 )             25.8     06-23    139  |  108  |  9   ----------------------------<  113<H>  4.2   |  17<L>  |  0.57    Ca    9.2      23 Jun 2021 01:00  Phos  2.2     06-23  Mg     2.1     06-23    TPro  6.9  /  Alb  3.3  /  TBili  0.8  /  DBili  x   /  AST  142<H>  /  ALT  87<H>  /  AlkPhos  244<H>  06-23    PT/INR - ( 22 Jun 2021 00:22 )   PT: 14.0 sec;   INR: 1.18 ratio         PTT - ( 22 Jun 2021 20:38 )  PTT:42.6 sec

## 2021-06-23 NOTE — PROGRESS NOTE ADULT - ATTENDING COMMENTS
Briefly, 66 y/o M w/ h/o stage D NICM s/p HM2 on 9/2017 as DT (due to severe PAD) with TV ring, prior COVID-19 infection 4/2020, recurrent syncope post LVAD s/p ILR, and chronic abdominal pain with prior negative workup who was admitted on 6/14 with symptomatic anemia with Hgb 4.5 in setting of INR 8.8 without hemodynamic instability. Video capsule showed active bleeding in the mid small bowel but subsequent enteroscopy 6/15 did not reveal any active bleeding. He acutely decompensated after procedure with fever/hypertension, low flow alarms, and pulmonary infiltrate with hypoxia requiring intubation from probable aspiration though TRALI/TACO also on the differential. Found to have C. diff on 9/20. LVAD speed was increased to 9000 to unload LV better. Has not been able to tolerate CPAP and has significant secretions. Underwent CT w/o evidence of toxic megacolon. Currently remains intubated and deeply sedated (d/t agitation), JVP 8-10 with HJR, LVAD hum present, softer abdomen, no pedal edema. Labs reviewed - WBC 15 (uptrending), Hb 7.0 (downtrending), BUN/Cr stable, LDH stable. Abdom x-ray with distended transverse colon and CT showed fluid-filled colon.   - wean vent as tolerated; adjust sedation; may require trach as he's been intubated for 7 days  - give lasix 20 mg PO x1; goal net negative  - MAP at goal; of note very pulsatile on A-line tracing  - c/w vanc PO and IV flagyl for C. diff  - monitor H/H; guaiac positive; may need repeat scope and may need to hold heparin

## 2021-06-24 NOTE — PROGRESS NOTE ADULT - ATTENDING COMMENTS
Briefly, 66 y/o M w/ h/o stage D NICM s/p HM2 on 9/2017 as DT (due to severe PAD) with TV ring, prior COVID-19 infection 4/2020, recurrent syncope post LVAD s/p ILR, and chronic abdominal pain with prior negative workup who was admitted on 6/14 with symptomatic anemia with Hgb 4.5 in setting of INR 8.8 without hemodynamic instability. Video capsule showed active bleeding in the mid small bowel but subsequent enteroscopy 6/15 did not reveal any active bleeding. He acutely decompensated after procedure with fever/hypertension, low flow alarms, and pulmonary infiltrate with hypoxia requiring intubation from probable aspiration though TRALI/TACO also on the differential. Found to have C. diff on 9/20. LVAD speed was increased to 9000 to unload LV better. Has not been able to tolerate CPAP and has significant secretions. Underwent CT w/o evidence of toxic megacolon. Currently remains intubated and deeply sedated (d/t agitation), JVP 8-10 with HJR, LVAD hum present, softer abdomen, no pedal edema. Labs reviewed - WBC 14 (uptrending), Hb 8.2 (s/p transfusion), BUN/Cr stable, . Abdom x-ray with distended transverse colon and CT showed fluid-filled colon.   - unable to be weaned from vent; plan for trach  - maintain CVP 6-8  - MAP at goal; of note very pulsatile on A-line tracing  - c/w vanc PO and IV flagyl for C. diff  - monitor H/H; guaiac positive; may need repeat scope but will monitor for now; c/w hep gtt (goal 60-70); ASA on hold given GIB

## 2021-06-24 NOTE — PROGRESS NOTE ADULT - PROBLEM SELECTOR PLAN 3
- Bcx x 1 w/ S.epi, likely contaminant  - respiratory and blood cultures NGTD and procalcitonin normal  - BAL w/ hyperemic airways, but no hemorrhage  - ID following, s/p course of pip/tazo

## 2021-06-24 NOTE — PROGRESS NOTE ADULT - SUBJECTIVE AND OBJECTIVE BOX
INFECTIOUS DISEASES FOLLOW UP-- Anitra Prater  [phone]    This is a follow up note for this  66yMale with  Anemia  admitted with C.diff diarrhea  remains intubated with increasing secretions from ET tube        ROS:  CONSTITUTIONAL: low grade fevers,  intubated, sedated    Allergies    No Known Allergies    Intolerances        ANTIBIOTICS/RELEVANT:  antimicrobials  metroNIDAZOLE  IVPB      metroNIDAZOLE  IVPB 500 milliGRAM(s) IV Intermittent every 6 hours  vancomycin    Solution 500 milliGRAM(s) Oral every 6 hours    immunologic:    OTHER:  acetaminophen    Suspension .. 650 milliGRAM(s) Oral every 6 hours PRN  aMIOdarone    Tablet 200 milliGRAM(s) Oral daily  chlorhexidine 0.12% Liquid 15 milliLiter(s) Oral Mucosa every 12 hours  chlorhexidine 2% Cloths 1 Application(s) Topical <User Schedule>  clonazePAM  Tablet 1 milliGRAM(s) Oral every 8 hours  dexMEDEtomidine Infusion 0.5 MICROgram(s)/kG/Hr IV Continuous <Continuous>  heparin  Infusion 400 Unit(s)/Hr IV Continuous <Continuous>  insulin lispro (ADMELOG) corrective regimen sliding scale   SubCutaneous every 6 hours  ketamine Infusion. 0.501 mG/kG/Hr IV Continuous <Continuous>  metoclopramide Injectable 10 milliGRAM(s) IV Push every 8 hours  midazolam Injectable 1 milliGRAM(s) IV Push once  pantoprazole  Injectable 40 milliGRAM(s) IV Push every 12 hours  sodium chloride 0.9%. 1000 milliLiter(s) IV Continuous <Continuous>      Objective:  Vital Signs Last 24 Hrs  T(C): 37.5 (24 Jun 2021 16:00), Max: 38 (24 Jun 2021 12:00)  T(F): 99.5 (24 Jun 2021 16:00), Max: 100.4 (24 Jun 2021 12:00)  HR: 72 (24 Jun 2021 17:00) (51 - 78)  BP: --  BP(mean): 82 (24 Jun 2021 08:00) (78 - 88)  RR: 22 (24 Jun 2021 17:00) (15 - 35)  SpO2: 98% (24 Jun 2021 17:00) (97% - 100%)    PHYSICAL EXAM:  Constitutional: sedated, low grade fevers  Eyes:ALONSO, EOMI  Ear/Nose/Throat: no oral lesions, Et tube with copious secretions	  Respiratory: clear BL  Cardiovascular: [id number] sounds  Gastrointestinal: distended but soft  Extremities:no e/e/c  No Lymphadenopathy  IV sites not inflammed.    LABS:                        8.2    14.02 )-----------( 228      ( 24 Jun 2021 00:11 )             26.7     06-24    137  |  108  |  9   ----------------------------<  131<H>  4.3   |  16<L>  |  0.67    Ca    9.0      24 Jun 2021 00:11  Phos  3.0     06-24  Mg     1.8     06-24    TPro  6.9  /  Alb  2.9<L>  /  TBili  0.5  /  DBili  x   /  AST  108<H>  /  ALT  97<H>  /  AlkPhos  294<H>  06-24    PTT - ( 24 Jun 2021 14:32 )  PTT:42.1 sec      MICROBIOLOGY:            RECENT CULTURES:  06-23 @ 12:38  .Sputum Sputum trap  --  --  --    Numerous Citrobacter freundii  Moderate Klebsiella pneumoniae  --  06-20 @ 20:56  .Sputum Sputum  --  --  --    Normal Respiratory Bria present  --  06-20 @ 16:31  .Blood Blood-Peripheral  Blood Culture PCR  Blood Culture PCR  PCR    No growth to date.  --  06-19 @ 00:21  .Blood Blood-Peripheral  --  --  --    No Growth Final  --  06-18 @ 08:23  .Blood Blood  --  --  --    No Growth Final  --      RADIOLOGY & ADDITIONAL STUDIES:    < from: Xray Chest 1 View- PORTABLE-Routine (Xray Chest 1 View- PORTABLE-Routine in AM.) (06.24.21 @ 02:22) >  IMPRESSION:  Smaller left mid to lower lung infiltrate. Mild right lower lung linear atelectasis.    < end of copied text >

## 2021-06-24 NOTE — PROGRESS NOTE ADULT - SUBJECTIVE AND OBJECTIVE BOX
Preop Dx: Respiratory Failure   Surgeon: Dr Rodriguez   Procedure: Tracheostomy     Vital Signs Last 24 Hrs  T(C): 36.2 (2021 19:15), Max: 38 (2021 12:00)  T(F): 97.2 (2021 19:15), Max: 100.4 (2021 12:00)  HR: 76 (2021 22:00) (51 - 78)  BP: --  BP(mean): 80 (2021 19:15) (79 - 88)  RR: 24 (2021 22:00) (20 - 47)  SpO2: 98% (2021 22:00) (97% - 100%)                        8.2    14.02 )-----------( 228      ( 2021 00:11 )             26.7     06-    137  |  108  |  9   ----------------------------<  131<H>  4.3   |  16<L>  |  0.67    Ca    9.0      2021 00:11  Phos  3.0       Mg     1.8     24    TPro  6.9  /  Alb  2.9<L>  /  TBili  0.5  /  DBili  x   /  AST  108<H>  /  ALT  97<H>  /  AlkPhos  294<H>  -    PTT - ( 2021 14:32 )  PTT:42.1 sec  Daily     Daily Weight in k.6 (2021 00:00)    EKG:  Ventricular Rate 97 BPM    Atrial Rate 97 BPM    P-R Interval 144 ms    QRS Duration 124 ms    Q-T Interval 398 ms    QTC Calculation(Bazett) 505 ms    P Axis 39 degrees    R Axis -74 degrees    T Axis 45 degrees          CXR:     EXAM:  XR CHEST PORTABLE ROUTINE 1V                            PROCEDURE DATE:  2021            INTERPRETATION:  CLINICAL INDICATION: Status post cardiothoracic surgery.    EXAM: Frontal radiograph of the chest.    COMPARISON: Chest radiograph from 2021.    FINDINGS:  Endotracheal tube tip terminates above the bud.  Right IJ central venous catheter tip terminates in the right atrium.  Status post median sternotomy.  Loop recorder.  Right lower lung linear atelectasis. Smaller left lower lung infiltrate.  There is no pleural effusion or pneumothorax.  The heart size is not well evaluated on this projection.    IMPRESSION:  Smaller left mid to lower lung infiltrate. Mild right lower lung linear atelectasis.            Type and Screen: O+ (neg antibodies)    Plan:  - OR 21 for tracheostomy with Dr. Rodriguez  - NPO after midnight except meds  - IVF while NPO  - Consent done  - Medical clearance for OR

## 2021-06-24 NOTE — PROGRESS NOTE ADULT - PROBLEM SELECTOR PLAN 1
- wean ventilator as tolerates  - sedation vacation and wean as per CTU  - c/w tx PNA, f/u ID input given CT chest findings  - Appr ENT recs, plan for trach tomorrow

## 2021-06-24 NOTE — PROGRESS NOTE ADULT - PROBLEM SELECTOR PLAN 5
- Increased speed to 9000 RPM to better unload ventricle. Would repeat TTE when extubated  - Euvolemic on exam today  - c/w heparin but would target lower PTT goal at this time. Eventual resumption of warfarin   - Will plan to hold aspirin indefinitely  - Continue to monitor LDH daily. - Increased speed to 9000 RPM to better unload ventricle. Would repeat TTE when extubated  - Euvolemic on exam today  - c/w heparin but would target lower PTT goal at this time. Eventual resumption of warfarin   - Will plan to hold aspirin indefinitely given GIB  - Continue to monitor LDH daily.

## 2021-06-24 NOTE — PROGRESS NOTE ADULT - SUBJECTIVE AND OBJECTIVE BOX
CRITICAL CARE ATTENDING - CTICU    MEDICATIONS  (STANDING):  aMIOdarone    Tablet 200 milliGRAM(s) Oral daily  chlorhexidine 0.12% Liquid 15 milliLiter(s) Oral Mucosa every 12 hours  chlorhexidine 2% Cloths 1 Application(s) Topical <User Schedule>  clonazePAM  Tablet 1 milliGRAM(s) Oral every 8 hours  dexMEDEtomidine Infusion 0.5 MICROgram(s)/kG/Hr (9.81 mL/Hr) IV Continuous <Continuous>  heparin  Infusion 400 Unit(s)/Hr (11.5 mL/Hr) IV Continuous <Continuous>  insulin lispro (ADMELOG) corrective regimen sliding scale   SubCutaneous every 6 hours  ketamine Infusion. 0.501 mG/kG/Hr (3.93 mL/Hr) IV Continuous <Continuous>  metoclopramide Injectable 10 milliGRAM(s) IV Push every 8 hours  metroNIDAZOLE  IVPB      metroNIDAZOLE  IVPB 500 milliGRAM(s) IV Intermittent every 6 hours  pantoprazole  Injectable 40 milliGRAM(s) IV Push every 12 hours  sodium chloride 0.9%. 1000 milliLiter(s) (10 mL/Hr) IV Continuous <Continuous>  vancomycin    Solution 500 milliGRAM(s) Oral every 6 hours                                    8.2    14.02 )-----------( 228      ( 2021 00:11 )             26.7       06-    137  |  108  |  9   ----------------------------<  131<H>  4.3   |  16<L>  |  0.67    Ca    9.0      2021 00:11  Phos  3.0       Mg     1.8         TPro  6.9  /  Alb  2.9<L>  /  TBili  0.5  /  DBili  x   /  AST  108<H>  /  ALT  97<H>  /  AlkPhos  294<H>        PTT - ( 2021 12:16 )  PTT:45.6 sec    Mode: AC/ CMV (Assist Control/ Continuous Mandatory Ventilation)  RR (machine): 20  TV (machine): 500  FiO2: 40  PEEP: 5  ITime: 1  MAP: 10  PIP: 24      Daily     Daily Weight in k.6 (2021 00:00)      - @ 07:01  -   @ 07:00  --------------------------------------------------------  IN: 2870.5 mL / OUT: 3755 mL / NET: -884.5 mL        Critically Ill patient  : [ ] preoperative ,   [ ] post operative  [x] Non operative     Requires :  [x] Arterial Line   [x] Central Line  [ ] PA catheter  [ ] IABP  [ ] ECMO  [x] LVAD  [x] Ventilator  [ ] pacemaker [ ] Impella.                      [x] ABG's     [x] Pulse Oxymetry Monitoring  Bedside evaluation , monitoring , treatment of hemodynamics , fluids , IVP/ IVCD meds.        Diagnosis:     Admitted to CTU on  for melena with anemia     LVAD patient     Ventilator Management:  [x]AC-rest    []CPAP-PS Wean    [ ]Trach Collar     [ ]Extubate    [ ] T-Piece  [ ]peep>5     IV Precedex, and IV ketamine for vent synchrony / agitation / delirium     Hypovolemia     Respiratory Failure - ARDS - resolving    Melena     Sepsis - L Pneumonia/ pneumonitis - resolved     Requires chest PT, pulmonary toilet, ambu bagging, suctioning to maintain SaO2,  patent airway and treat atelectasis.    Requires bedside physical therapy, mobilization and total half-way care.    Tolerates NG / NJ feeds at [x] goal rate    [ ] trophic rate    [ ]       rate     IVCD anticoagulation with [x ] Heparin  [ ] Argatroban for VAD circuit     Trach planning         I, Luis Fernando Thompson, personally performed the services described in this documentation. All medical record entries made by the scribe were at my direction and in my presence. I have reviewed the chart and agree that the record reflects my personal performance and is accurate and complete.   Luis Fernando Thompson MD.       By signing my name below, I, Emily Roa, attest that this documentation has been prepared under the direction and in the presence of Luis Fernando Thompson MD.   Electronically Signed: Emily Roa Scribe 21 @ 07:32        Discussed with CT surgeon, Physician Assistant - Nurse Practitioner- Critical care medicine team.   Dicussed at  AM / PM rounds.   Chart, labs , films reviewed.    Cumulative Critical Care Time Given Today:  CRITICAL CARE ATTENDING - CTICU    MEDICATIONS  (STANDING):  aMIOdarone    Tablet 200 milliGRAM(s) Oral daily  chlorhexidine 0.12% Liquid 15 milliLiter(s) Oral Mucosa every 12 hours  chlorhexidine 2% Cloths 1 Application(s) Topical <User Schedule>  clonazePAM  Tablet 1 milliGRAM(s) Oral every 8 hours  dexMEDEtomidine Infusion 0.5 MICROgram(s)/kG/Hr (9.81 mL/Hr) IV Continuous <Continuous>  heparin  Infusion 400 Unit(s)/Hr (11.5 mL/Hr) IV Continuous <Continuous>  insulin lispro (ADMELOG) corrective regimen sliding scale   SubCutaneous every 6 hours  ketamine Infusion. 0.501 mG/kG/Hr (3.93 mL/Hr) IV Continuous <Continuous>  metoclopramide Injectable 10 milliGRAM(s) IV Push every 8 hours  metroNIDAZOLE  IVPB      metroNIDAZOLE  IVPB 500 milliGRAM(s) IV Intermittent every 6 hours  pantoprazole  Injectable 40 milliGRAM(s) IV Push every 12 hours  sodium chloride 0.9%. 1000 milliLiter(s) (10 mL/Hr) IV Continuous <Continuous>  vancomycin    Solution 500 milliGRAM(s) Oral every 6 hours                                    8.2    14.02 )-----------( 228      ( 2021 00:11 )             26.7       06-    137  |  108  |  9   ----------------------------<  131<H>  4.3   |  16<L>  |  0.67    Ca    9.0      2021 00:11  Phos  3.0       Mg     1.8         TPro  6.9  /  Alb  2.9<L>  /  TBili  0.5  /  DBili  x   /  AST  108<H>  /  ALT  97<H>  /  AlkPhos  294<H>        PTT - ( 2021 12:16 )  PTT:45.6 sec    Mode: AC/ CMV (Assist Control/ Continuous Mandatory Ventilation)  RR (machine): 20  TV (machine): 500  FiO2: 40  PEEP: 5  ITime: 1  MAP: 10  PIP: 24      Daily     Daily Weight in k.6 (2021 00:00)      - @ 07:01  -   @ 07:00  --------------------------------------------------------  IN: 2870.5 mL / OUT: 3755 mL / NET: -884.5 mL        Critically Ill patient  : [ ] preoperative ,   [ ] post operative  [x] Non operative     Requires :  [x] Arterial Line   [x] Central Line  [ ] PA catheter  [ ] IABP  [ ] ECMO  [x] LVAD  [x] Ventilator  [ ] pacemaker [ ] Impella.                      [x] ABG's     [x] Pulse Oxymetry Monitoring  Bedside evaluation , monitoring , treatment of hemodynamics , fluids , IVP/ IVCD meds.        Diagnosis:     Admitted to CTU on  for melena with anemia     LVAD patient     Ventilator Management:  [x]AC-rest    [x]CPAP-PS Wean    [ ]Trach Collar     [ ]Extubate    [ ] T-Piece  [ ]peep>5     IV Precedex, and IV ketamine for vent synchrony / agitation / delirium     Hypovolemia     Respiratory Failure - ARDS - resolving    Difficult weaning process - multiple organ system involvement in critically ill patient     Melena     Sepsis - L Pneumonia/ pneumonitis - resolving      Requires chest PT, pulmonary toilet, ambu bagging, suctioning to maintain SaO2,  patent airway and treat atelectasis.    Requires bedside physical therapy, mobilization and total longterm care.    Tolerates NG / NJ feeds at [x] goal rate    [ ] trophic rate    [ ]       rate     IVCD anticoagulation with [x ] Heparin  [ ] Argatroban for VAD circuit     Trach planning         I, Luis Fernando Thompson, personally performed the services described in this documentation. All medical record entries made by the scribe were at my direction and in my presence. I have reviewed the chart and agree that the record reflects my personal performance and is accurate and complete.   Luis Fernando Thompson MD.       By signing my name below, I, Emily Roa, attest that this documentation has been prepared under the direction and in the presence of Luis Fernando Thompson MD.   Electronically Signed: Emily Roa Scribe 21 @ 07:32        Discussed with CT surgeon, Physician Assistant - Nurse Practitioner- Critical care medicine team.   Dicussed at  AM / PM rounds.   Chart, labs , films reviewed.    Cumulative Critical Care Time Given Today:  30 min

## 2021-06-24 NOTE — PROGRESS NOTE ADULT - ASSESSMENT
66 YO M with a history of stage D NICM s/p HM2 on 9/2017 as DT (due to severe PAD) with TV ring, prior COVID-19 infection 4/2020, recurrent syncope post LVAD s/p ILR, and chronic abdominal pain with prior negative workup who was admitted with symptomatic anemia with Hgb 4.5 in setting of INR 8.8 without hemodynamic instability. He was transfused and underwent VCE which showed active bleeding in the mid small bowel but subsequent enteroscopy 6/15 did not reveal any active bleeding. He acutely decompensated after procedure with fever/hypertension, low flow alarms, and pulmonary infiltrate with hypoxia requiring intubation from probable aspiration though TRALI/TACO also on the differential. Reassuringly his cultures have been negative. His LDH is normal and there are no signs of overt LVAD dysfunction on echo though signs of insufficiently unloaded ventricle for which we increased speed on 6/19.    He remains on ventilator although CXR is improving. He also has acute Cdiff colitis and is currently being tx w/ vanco PO and metronidazole.

## 2021-06-24 NOTE — PROGRESS NOTE ADULT - ASSESSMENT
Assessment and Recommendation:   · Assessment	  Assessment and recommendation :  Recurrent Acute hypoxic respiratory Failure intubated on full ventilatory support   Acute left lower lobe pneumonia  possible Aspiration pneumonia   Diarrhea +ve for C-diff. colitis on PO vancomycin and IV Flagyl   recurrent fever start on cefepime   CT scan of abdomen negative for toxic megacolon    Non ischemic cardiomyopathy continue ACE inhibitor and B-Blockers   Septic shock and cardiogenic shock   Stage D systolic heart failure S/P LVAD HM2   MH2 LVAD  with  TV Annuloplasty  Severe peripheral vascular disease   Anion gap metabolic acidosis  severe hyperglycemia on insulin coverage    On  Amiodarone and IV heparin   critical care polyneuropathy   Anemia of Acute blood Loss   S/P Small bowel bleeding   S/P blood and FFP transfusion   Chronic kidney disease stage III  Continue NGT feeding   GI prophylaxis  discuss with TCV surgeon   critical care time spend is 35 minutes

## 2021-06-24 NOTE — PROGRESS NOTE ADULT - SUBJECTIVE AND OBJECTIVE BOX
RICKY JOINT  MRN#: [id number]  Subjective:  Pulmonary progress  : recurrent Acute hypoxic respiratory Failure ,aspiration pneumonia, NICM  , chart reviewed and H/O obtained radiological and Laboratory study reviewed patient Examined     65M PMH ACC/AHA stage D HF due to NICM HM2 LVAD , TV annuloplasty ring 17 as destination therapy due to severe peripheral artery disease with significant stenosis  SIADH, Depression, CKD-3 with hyperkalemia, past E. coli UTIs, driveline drainage (21) and COVID-19 (back in 2020)  He was recently seen in clinic where he complained of abdominal pain and dark stools w constipation back in May. He presents to [facility] ER today weakness and fatigue, moderate and + Black stools for three days, on coumadin secondary to warfarin use in the setting of an LVAD. Patient has required transfusions for GIB in the past. Mostly recently back in 2021 pt had anemia with dark stools. No interventions was done at that time. However Last Endoscopy was done in 2020 (negative). Today labs show patient is anemic with H/H of 4.5/16.3,. INR is 8.84 MAP in the 90s, Temp 35.1. He denies any chest pain, shortness of breath, dizziness, abd pain, nausea or vomiting. found to have  rectal bleeding underwent endoscopy ,old blood in the proximal ileum ,  develop sepsis with LL opacity given Antibiotics , Extubated , reintubated , Bronchoscopy on Zosyn for LL pneumonia  and Amiodarone S/P TV Annuloplasty , patient remain intubated on full ventilatory support .S/P multiple units of blood transfusion , remain on full ventilatory support on Precedex and propofol , new central IJ line , diarrhea C diff. +ve on po vancomycin and IV Flagyl,  mildly distended belly , fever start on cefepime 2gm q 8 hrs   CT Abdomen and Pelvis w/ IV Cont (21 @ 10:44) >  IMPRESSION:  Fluid-filled colon which may be secondary to rapid transit.    Small bilateral pleural effusions with associated compressive atelectasis..    New left upper and lower lobe parenchymal opacities, suspicious for pneumonia.    Moderate stenosis in the proximal superior mesenteric artery         (2021 16:57)    PAST MEDICAL & SURGICAL HISTORY:  CHF (congestive heart failure)    CAD (coronary artery disease)  Depression    Pleural effusion    History of 2019 novel coronavirus disease (COVID-19)  2020    Hemorrhoids    Bleeding hemorrhoids    Peripheral arterial disease    Claudication    BPH with urinary obstruction    ACC/AHA stage D systolic heart failure  Anticoagulation goal of INR 2.0 to 2.5    Falls    Clavicle fracture    CKD (chronic kidney disease), stage III    Iron deficiency anemia    H/O epistaxis    Vertigo    GI bleed    S/P TVR (tricuspid valve repair)    S/P ventricular assist device    S/P endoscopy    OBJECTIVE:  ICU Vital Signs Last 24 Hrs  T(C): 38.2 (2021 10:00), Max: 38.5 (2021 12:00)  T(F): 100.8 (2021 10:00), Max: 101.3 (2021 12:00)  HR: 65 (2021 10:00) (61 - 69)  BP: --  BP(mean): --  ABP: 105/67 (2021 10:00) (90/54 - 113/64)  ABP(mean): 77 (2021 10:00) (63 - 77)  RR: 20 (2021 10:00) (19 - 35)  SpO2: 99% (2021 10:00) (96% - 100%)       @ 07: @ 07:00  --------------------------------------------------------  IN: 2693.9 mL / OUT: 1415 mL / NET: 1278.9 mL     @ 07: @ 10:49  --------------------------------------------------------  IN: 420.8 mL / OUT: 115 mL / NET: 305.8 mL  PHYSICAL EXAM:Daily   Elderly male intubated sedated  on full ventilatory support /40%20/5cm PEEP  0n vasopressin   Daily Weight in k.4 (2021 00:00)  HEENT:     + NCAT  + EOMI  - Conjuctival edema   - Icterus   - Thrush   - ETT  + NGT/OGT  Neck:         + FROM   LT  IJ  JVD     - Nodes     - Masses    + Mid-line trachea   - Tracheostomy  Chest: normal findings  Lungs:          + CTA   + Rhonchi    - Rales    - Wheezing     - Decreased BS   - Dullness R L  Cardiac:       + S1 + S2    + RRR   - Irregular   - S3  - S4    - Murmurs   - Rub   - Hamman’s sign   Abdomen:    + BS     + Soft    + Non-tender     - Distended    - Organomegaly  - PEG  Extremities:   - Cyanosis U/L   - Clubbing  U/L  + LE/UE Edema   + Capillary refill    + Pulses   Neuro:        - Awake   -  Alert   - Confused   - Lethargic   + Sedated  + Generalized Weakness  Skin:        - Rashes    - Erythema   + Normal incisions   + IV sites intact          HOSPITAL MEDICATIONS: All mediciations reviewed and analyzed  MEDICATIONS  (STANDING):  aMIOdarone    Tablet 200 milliGRAM(s) Oral daily  chlorhexidine 0.12% Liquid 15 milliLiter(s) Oral Mucosa every 12 hours  chlorhexidine 2% Cloths 1 Application(s) Topical <User Schedule>  dexMEDEtomidine Infusion 0.5 MICROgram(s)/kG/Hr (9.81 mL/Hr) IV Continuous <Continuous>  dextrose 50% Injectable 50 milliLiter(s) IV Push every 15 minutes  heparin  Infusion 400 Unit(s)/Hr (12.5 mL/Hr) IV Continuous <Continuous>  HYDROmorphone  Injectable 0.5 milliGRAM(s) IV Push once  insulin lispro (ADMELOG) corrective regimen sliding scale   SubCutaneous every 6 hours  pantoprazole  Injectable 40 milliGRAM(s) IV Push every 12 hours  piperacillin/tazobactam IVPB.. 3.375 Gram(s) IV Intermittent every 8 hours  propofol Infusion 20 MICROgram(s)/kG/Min (9.42 mL/Hr) IV Continuous <Continuous>  sodium chloride 0.9% lock flush 3 milliLiter(s) IV Push every 8 hours  sodium chloride 0.9%. 1000 milliLiter(s) (10 mL/Hr) IV Continuous <Continuous>    MEDICATIONS  (PRN):  acetaminophen    Suspension .. 650 milliGRAM(s) Oral every 6 hours PRN Temp greater or equal to 38C (100.4F)    LABS: All Lab data reviewed and analyzed                         8.2    14.02 )-----------( 228      ( 2021 00:11 )             26.7    06-    137  |  108  |  9   ----------------------------<  131<H>  4.3   |  16<L>  |  0.67    Ca    9.0      2021 00:11  Phos  3.0     06-24  Mg     1.8         TPro  6.9  /  Alb  2.9<L>  /  TBili  0.5  /  DBili  x   /  AST  108<H>  /  ALT  97<H>  /  AlkPhos  294<H>        Ca    9.2      2021 01:00  Phos  2.2     06-  Mg     2.1     -    TPro  6.9  /  Alb  3.3  /  TBili  0.8  /  DBili  x   /  AST  142<H>  /  ALT  87<H>  /  AlkPhos  244<H>               PTT - ( 2021 04:52 )  PTT:45.2 sec LIVER FUNCTIONS - ( 2021 00:42 )  Alb: 3.4 g/dL / Pro: 6.7 g/dL / ALK PHOS: 213 U/L / ALT: 15 U/L / AST: 24 U/L / GGT: x           RADIOLOGY: - Reviewed and analyzed LLL  pneumonia , LVAD HM2, CT scan of abdomen reviewed result noted

## 2021-06-24 NOTE — PROGRESS NOTE ADULT - ASSESSMENT
imp/rx:  Possible aspiration pneumonitis at time of endoscopic procedure  Completed a course of therapy with Zosyn      Recent course complicated by C.diff diarrhea    Abdomen somewhat distended- but soft  Agree with Vanco oral 500mg qid  Continue IV flagyl 500mg q 8hr    Leukocytosis remains in the moderate range  low grade fevers  would send blood cultures x 2 sets  low threshold to start IV Cefepime 2 gm IV q 8hr    Morris Prater MD  [phone]  After 5pm/weekends [phone]          Morris Prater MD  [phone]  After 5pm/weekends [phone]

## 2021-06-24 NOTE — CHART NOTE - NSCHARTNOTEFT_GEN_A_CORE
Nutrition Follow Up Note    Patient seen for: malnutirtion follow up     Source:  RN, medical record, CTU team, HF team     Chart reviewed, events noted. 65 year old male with PMHx of NICM, s.p  HM2 LVAD in , on coumadin, CKD 3 presents with anemia, dark stools and supra therapeutic  INR.  S/p capsule study, and s/p endoscopy. Course c/b possible aspiration event. GI team had been following, NGT to [facility].   Remains intubated, sedated. Now C-Diff +,     Diet: NPO with EN via NGT.   Vital Af 1.2 to 45ml/hr x24hrs + 1 no carb pro source in the setting of escalating propofol use. Will provide: 1336cals and 92gm protein. (17.0kcal/kg and 1.17gm pro/kg based on dosing bony). Of note, At this time, Pt had been previously receiving an additional 623cals from propofol.     EN provision:   : Feeds restarted at 10ml/hr  : increased as tolerated   : reached goal rate of 45ml/hr.     Pt had 2 BM's over night. Per RN abdominal remains slightly distended.   No further residuals noted since starting reglan.     Triglyceride levels checked on , were elevated to 518.   Propofol has since been discontinued, Pt being seated with ketamine.       Daily Weight in k.6 (-), Weight in k.7 (), Weight in k.7 (), Weight in k.5 (), Weight in k.4 (), Weight in k.4 (-), Weight in k.8 ()    Drug Dosing Weight  Weight (kg): 78.5 (15 Jamil 2021 12:14)  BMI (kg/m2): 24.8 (15 Jamil 2021 12:14)      Pertinent Medications: MEDICATIONS  (STANDING):  aMIOdarone    Tablet 200 milliGRAM(s) Oral daily  chlorhexidine 0.12% Liquid 15 milliLiter(s) Oral Mucosa every 12 hours  chlorhexidine 2% Cloths 1 Application(s) Topical <User Schedule>  clonazePAM  Tablet 1 milliGRAM(s) Oral every 8 hours  dexMEDEtomidine Infusion 0.5 MICROgram(s)/kG/Hr (9.81 mL/Hr) IV Continuous <Continuous>  heparin  Infusion 400 Unit(s)/Hr (11.5 mL/Hr) IV Continuous <Continuous>  insulin lispro (ADMELOG) corrective regimen sliding scale   SubCutaneous every 6 hours  ketamine Infusion. 0.501 mG/kG/Hr (3.93 mL/Hr) IV Continuous <Continuous>  metoclopramide Injectable 10 milliGRAM(s) IV Push every 8 hours  metroNIDAZOLE  IVPB      metroNIDAZOLE  IVPB 500 milliGRAM(s) IV Intermittent every 6 hours  pantoprazole  Injectable 40 milliGRAM(s) IV Push every 12 hours  sodium chloride 0.9%. 1000 milliLiter(s) (10 mL/Hr) IV Continuous <Continuous>  vancomycin    Solution 500 milliGRAM(s) Oral every 6 hours    MEDICATIONS  (PRN):  acetaminophen    Suspension .. 650 milliGRAM(s) Oral every 6 hours PRN Temp greater or equal to 38C (100.4F)      LABS:    @ 00:11: Sodium 137, Potassium 4.3, Calcium 9.0, Magnesium 1.8, Phosphorus 3.0, BUN 9, Creatinine 0.67, Glucose 131<H>  Hemoglobin : 8.2 g/dL  Hematocrit : 26.7 %        A1C with Estimated Average Glucose Result: 5.6 % (06-15-21 @ 02:42)          POCT Blood Glucose.: 129 mg/dL (21 @ 04:44)  POCT Blood Glucose.: 132 mg/dL (21 @ 23:44)  POCT Blood Glucose.: 141 mg/dL (21 @ 18:03)  POCT Blood Glucose.: 157 mg/dL (21 @ 11:54)     LIVER FUNCTIONS - ( 2021 00:11 )  Alb: 2.9 g/dL / Pro: 6.9 g/dL / ALK PHOS: 294 U/L / ALT: 97 U/L / AST: 108 U/L / GGT: x           Triglycerides, Serum: 518 mg/dL (21 @ 09:02)        Finger Sticks:  POCT Blood Glucose.: 129 mg/dL ( @ 04:44)  POCT Blood Glucose.: 132 mg/dL ( @ 23:44)  POCT Blood Glucose.: 141 mg/dL ( @ 18:03)  POCT Blood Glucose.: 157 mg/dL ( @ 11:54)      Skin per nursing documentation: intact  Edema: +1 generalized and +2 scrotal.     Estimated Needs:   [ X] no change since previous assessment  Estimated Energy Needs: Based on dosing weight: 78.5kg   20-25 shantelle/kg   · Calculated From (shantelle/kg)	1570  · Calculated To (shantelle/kg)	1962     Estimated Protein Needs:  1.2-1.4 gm pro/kg   · Calculated From (g/kg)	94.2  · Calculated To (g/kg)	109.9    Previous Nutrition Diagnosis: acute moderate malnutrition   Nutrition Diagnosis is: on going, being addressed with escalation of nutrition support.     New Nutrition Diagnosis: None      Interventions:     Recommend    Monitoring and Evaluation:   Continue to monitor nutritional intake, tolerance to diet prescription, weights, labs, skin integrity  RD remains available upon request and will follow up per protocol  Clarisa Greene RD, CDN, [facility] Pager #[phone]. Nutrition Follow Up Note    Patient seen for: malnutrition follow up     Source:  RN, medical record, CTU team, HF team     Chart reviewed, events noted. 65 year old male with PMHx of NICM, s/p  HM2 LVAD in , on coumadin, CKD 3 presents with anemia, dark stools and supra therapeutic  INR.  S/p capsule study, and s/p endoscopy. Course c/b possible aspiration event. GI team had been following.    Remains intubated, sedated. C-Diff +, being treated with vancomycin     Diet: NPO with EN via NGT.   Vital Af 1.2 to 45ml/hr x24hrs + 1 no carb pro source in the setting of escalating propofol use. Will provide: 1336cals and 92gm protein. (17.0kcal/kg and 1.17gm pro/kg based on dosing bony). Of note, At this time, Pt had been previously receiving an additional 623cals from propofol.     EN provision:   : Feeds restarted at 10ml/hr  : increased as tolerated   : reached goal rate of 45ml/hr.     Pt had 2 BM's over night. Per RN abdominal remains slightly distended.   No further residuals noted since starting reglan.     Triglyceride levels checked on , were elevated to 518.   Propofol has since been discontinued, Pt being seated with ketamine.       Daily Weight in k.6 (), Weight in k.7 (), Weight in k.7 (), Weight in k.5 (), Weight in k.4 (), Weight in k.4 (-), Weight in k.8 (18)    Drug Dosing Weight  Weight (kg): 78.5 (15 Jamil 2021 12:14)  BMI (kg/m2): 24.8 (15 Jamil 2021 12:14)      Pertinent Medications: MEDICATIONS  (STANDING):  aMIOdarone    Tablet 200 milliGRAM(s) Oral daily  chlorhexidine 0.12% Liquid 15 milliLiter(s) Oral Mucosa every 12 hours  chlorhexidine 2% Cloths 1 Application(s) Topical <User Schedule>  clonazePAM  Tablet 1 milliGRAM(s) Oral every 8 hours  dexMEDEtomidine Infusion 0.5 MICROgram(s)/kG/Hr (9.81 mL/Hr) IV Continuous <Continuous>  heparin  Infusion 400 Unit(s)/Hr (11.5 mL/Hr) IV Continuous <Continuous>  insulin lispro (ADMELOG) corrective regimen sliding scale   SubCutaneous every 6 hours  ketamine Infusion. 0.501 mG/kG/Hr (3.93 mL/Hr) IV Continuous <Continuous>  metoclopramide Injectable 10 milliGRAM(s) IV Push every 8 hours  metroNIDAZOLE  IVPB      metroNIDAZOLE  IVPB 500 milliGRAM(s) IV Intermittent every 6 hours  pantoprazole  Injectable 40 milliGRAM(s) IV Push every 12 hours  sodium chloride 0.9%. 1000 milliLiter(s) (10 mL/Hr) IV Continuous <Continuous>  vancomycin    Solution 500 milliGRAM(s) Oral every 6 hours    MEDICATIONS  (PRN):  acetaminophen    Suspension .. 650 milliGRAM(s) Oral every 6 hours PRN Temp greater or equal to 38C (100.4F)      LABS:    @ 00:11: Sodium 137, Potassium 4.3, Calcium 9.0, Magnesium 1.8, Phosphorus 3.0, BUN 9, Creatinine 0.67, Glucose 131<H>  Hemoglobin : 8.2 g/dL  Hematocrit : 26.7 %        A1C with Estimated Average Glucose Result: 5.6 % (06-15-21 @ 02:42)          POCT Blood Glucose.: 129 mg/dL (21 @ 04:44)  POCT Blood Glucose.: 132 mg/dL (21 @ 23:44)  POCT Blood Glucose.: 141 mg/dL (21 @ 18:03)  POCT Blood Glucose.: 157 mg/dL (21 @ 11:54)     LIVER FUNCTIONS - ( 2021 00:11 )  Alb: 2.9 g/dL / Pro: 6.9 g/dL / ALK PHOS: 294 U/L / ALT: 97 U/L / AST: 108 U/L / GGT: x           Triglycerides, Serum: 518 mg/dL (21 @ 09:02)        Finger Sticks:  POCT Blood Glucose.: 129 mg/dL ( @ 04:44)  POCT Blood Glucose.: 132 mg/dL ( @ 23:44)  POCT Blood Glucose.: 141 mg/dL ( @ 18:03)  POCT Blood Glucose.: 157 mg/dL ( @ 11:54)      Skin per nursing documentation: intact  Edema: +1 generalized and +2 scrotal.     Estimated Needs:   [ X] no change since previous assessment  Estimated Energy Needs: Based on dosing weight: 78.5kg   20-25 shantelle/kg   · Calculated From (shantelle/kg)	1570  · Calculated To (shantelle/kg)	1962     Estimated Protein Needs:  1.2-1.4 gm pro/kg   · Calculated From (g/kg)	94.2  · Calculated To (g/kg)	109.9    Previous Nutrition Diagnosis: acute moderate malnutrition   Nutrition Diagnosis is: on going, being addressed with escalation of nutrition support.     New Nutrition Diagnosis: None      Interventions:     Recommend  1. In the setting of discontinuation of propofol, as tolerated by Pt, would increase Vital AF 1.2 to goal rate of 60ml/cgb89eio. Juventino provide: 1728kcals and 108gm protein (22kcals/kg and 1.37gm pro/kg based on dosing weight: 78.5kg)  2. Discontinue No carb pro source  3. Trend GI tolerance  4. Trend BG levels, renal indices, LFT's, electrolytes    Monitoring and Evaluation:   Continue to monitor nutritional intake, tolerance to diet prescription, weights, labs, skin integrity  RD remains available upon request and will follow up per protocol  Clarisa Greene RD, CDN, [facility] Pager #[phone].

## 2021-06-24 NOTE — PROGRESS NOTE ADULT - SUBJECTIVE AND OBJECTIVE BOX
Patient seen and examined at bedside.    Overnight Events: NAEO. This AM, pt remains intubated. He is awake/comfortable and arousable.    Review Of Systems: Unable to obtain.    Current Meds:  acetaminophen    Suspension .. 650 milliGRAM(s) Oral every 6 hours PRN  aMIOdarone    Tablet 200 milliGRAM(s) Oral daily  chlorhexidine 0.12% Liquid 15 milliLiter(s) Oral Mucosa every 12 hours  chlorhexidine 2% Cloths 1 Application(s) Topical <User Schedule>  clonazePAM  Tablet 1 milliGRAM(s) Oral every 8 hours  dexMEDEtomidine Infusion 0.5 MICROgram(s)/kG/Hr IV Continuous <Continuous>  heparin  Infusion 400 Unit(s)/Hr IV Continuous <Continuous>  insulin lispro (ADMELOG) corrective regimen sliding scale   SubCutaneous every 6 hours  ketamine Infusion. 0.501 mG/kG/Hr IV Continuous <Continuous>  metoclopramide Injectable 10 milliGRAM(s) IV Push every 8 hours  metroNIDAZOLE  IVPB      metroNIDAZOLE  IVPB 500 milliGRAM(s) IV Intermittent every 6 hours  pantoprazole  Injectable 40 milliGRAM(s) IV Push every 12 hours  sodium chloride 0.9%. 1000 milliLiter(s) IV Continuous <Continuous>  vancomycin    Solution 500 milliGRAM(s) Oral every 6 hours      Vitals:  T(F): 99 (06-24), Max: 99.5 (06-23)  HR: 75 (06-24) (51 - 75)  BP: --  RR: 22 (06-24)  SpO2: 99% (06-24)  I&O's Summary    23 Jun 2021 07:01  -  24 Jun 2021 07:00  --------------------------------------------------------  IN: 2870.5 mL / OUT: 3755 mL / NET: -884.5 mL    24 Jun 2021 07:01  -  24 Jun 2021 10:04  --------------------------------------------------------  IN: 178.2 mL / OUT: 150 mL / NET: 28.2 mL        Physical Exam:  Gen: Critically ill pt.  HEENT: NCAT. ETT in place.  Neck: No JVP elev.  CV: LVAD audible.  Chest: CTAB. No WRR.  Abd: +BSx4. Soft. Distended and mildly compressible.  Ext: No LE edema.  Skin: No cyanosis.                          8.2    14.02 )-----------( 228      ( 24 Jun 2021 00:11 )             26.7     06-24    137  |  108  |  9   ----------------------------<  131<H>  4.3   |  16<L>  |  0.67    Ca    9.0      24 Jun 2021 00:11  Phos  3.0     06-24  Mg     1.8     06-24    TPro  6.9  /  Alb  2.9<L>  /  TBili  0.5  /  DBili  x   /  AST  108<H>  /  ALT  97<H>  /  AlkPhos  294<H>  06-24    PTT - ( 23 Jun 2021 12:16 )  PTT:45.6 sec Patient seen and examined at bedside.    Overnight Events: NAEO. This AM, pt remains intubated. He is awake/comfortable and arousable.    Review Of Systems: Unable to obtain.    Current Meds:  acetaminophen    Suspension .. 650 milliGRAM(s) Oral every 6 hours PRN  aMIOdarone    Tablet 200 milliGRAM(s) Oral daily  chlorhexidine 0.12% Liquid 15 milliLiter(s) Oral Mucosa every 12 hours  chlorhexidine 2% Cloths 1 Application(s) Topical <User Schedule>  clonazePAM  Tablet 1 milliGRAM(s) Oral every 8 hours  dexMEDEtomidine Infusion 0.5 MICROgram(s)/kG/Hr IV Continuous <Continuous>  heparin  Infusion 400 Unit(s)/Hr IV Continuous <Continuous>  insulin lispro (ADMELOG) corrective regimen sliding scale   SubCutaneous every 6 hours  ketamine Infusion. 0.501 mG/kG/Hr IV Continuous <Continuous>  metoclopramide Injectable 10 milliGRAM(s) IV Push every 8 hours  metroNIDAZOLE  IVPB      metroNIDAZOLE  IVPB 500 milliGRAM(s) IV Intermittent every 6 hours  pantoprazole  Injectable 40 milliGRAM(s) IV Push every 12 hours  sodium chloride 0.9%. 1000 milliLiter(s) IV Continuous <Continuous>  vancomycin    Solution 500 milliGRAM(s) Oral every 6 hours      Vitals:  T(F): 99 (06-24), Max: 99.5 (06-23)  HR: 75 (06-24) (51 - 75)  BP: --  RR: 22 (06-24)  SpO2: 99% (06-24)  I&O's Summary    23 Jun 2021 07:01  -  24 Jun 2021 07:00  --------------------------------------------------------  IN: 2870.5 mL / OUT: 3755 mL / NET: -884.5 mL    24 Jun 2021 07:01  -  24 Jun 2021 10:04  --------------------------------------------------------  IN: 178.2 mL / OUT: 150 mL / NET: 28.2 mL        Physical Exam:  Gen: Critically ill pt.  HEENT: NCAT. ETT in place.  Neck: No JVP elev.  CV: LVAD audible.  Chest: CTAB. No WRR.  Abd: +BSx4. Soft. Distended and mildly compressible.  Ext: No LE edema.  Skin: No cyanosis.    LVAD interrogation:  Pump Speed: 9000  Pump Flow: 4.2  Pulsatility Index: 4.7  Pump Power: 6  Events: low flow events noted 6/24 at 0300 which resolved  Driveline: clean, dry, intact  Programming changes: none                        8.2    14.02 )-----------( 228      ( 24 Jun 2021 00:11 )             26.7     06-24    137  |  108  |  9   ----------------------------<  131<H>  4.3   |  16<L>  |  0.67    Ca    9.0      24 Jun 2021 00:11  Phos  3.0     06-24  Mg     1.8     06-24    TPro  6.9  /  Alb  2.9<L>  /  TBili  0.5  /  DBili  x   /  AST  108<H>  /  ALT  97<H>  /  AlkPhos  294<H>  06-24    PTT - ( 23 Jun 2021 12:16 )  PTT:45.6 sec

## 2021-06-25 NOTE — PROGRESS NOTE ADULT - SUBJECTIVE AND OBJECTIVE BOX
Interval hx: This AM, pt remains intubated/sedated. He is arousable and appears comfy. His WBC is rising and his temps remain borderline.    Medications:  acetaminophen    Suspension .. 650 milliGRAM(s) Oral every 6 hours PRN  aMIOdarone    Tablet 200 milliGRAM(s) Oral daily  cefepime   IVPB 1000 milliGRAM(s) IV Intermittent every 8 hours  chlorhexidine 0.12% Liquid 15 milliLiter(s) Oral Mucosa every 12 hours  chlorhexidine 2% Cloths 1 Application(s) Topical <User Schedule>  clonazePAM  Tablet 1 milliGRAM(s) Oral every 8 hours  dexMEDEtomidine Infusion 0.5 MICROgram(s)/kG/Hr IV Continuous <Continuous>  heparin  Infusion 400 Unit(s)/Hr IV Continuous <Continuous>  insulin lispro (ADMELOG) corrective regimen sliding scale   SubCutaneous every 6 hours  ketamine Infusion. 0.501 mG/kG/Hr IV Continuous <Continuous>  metoclopramide Injectable 10 milliGRAM(s) IV Push every 8 hours  metroNIDAZOLE  IVPB 500 milliGRAM(s) IV Intermittent every 8 hours  pantoprazole  Injectable 40 milliGRAM(s) IV Push every 12 hours  sodium chloride 0.9%. 1000 milliLiter(s) IV Continuous <Continuous>  vancomycin    Solution 500 milliGRAM(s) Oral every 6 hours      Vitals:  T(C): 37 (21 @ 08:00), Max: 38 (21 @ 12:00)  HR: 64 (21 @ 10:00) (59 - 79)  BP: --  BP(mean): 80 (21 @ 19:15) (80 - 80)  ABP: 111/74 (21 @ 10:00) (84/60 - 115/74)  ABP(mean): 86 (21 @ 10:00) (66 - 87)  RR: 20 (21 @ 10:00) (20 - 47)  SpO2: 100% (21 @ 10:00) (97% - 100%)    Daily     Daily Weight in k.7 (2021 01:00)        I&O's Summary    2021 07:01  -  2021 07:00  --------------------------------------------------------  IN: 3053.2 mL / OUT: 2080 mL / NET: 973.2 mL    2021 07:01  -  2021 10:39  --------------------------------------------------------  IN: 236.5 mL / OUT: 275 mL / NET: -38.5 mL        Physical Exam:  Gen: Critically ill pt.  HEENT: NCAT. ETT in place.  Neck: No JVP elev.  CV: LVAD audible.  Chest: Mech vent.  Abd: +BSx4. Soft. Distended but mildly compressible.  Ext: No LE edema.  Skin: No cyanosis.    LVAD Interrogation:  Pump Speed: 9000  Pump Flow: 4.4  Pulse Index: 6.7  Pump Power: 4.9  VAD Events: Rare PI events  Driveline evaluation: CDI  Programming Changes: No changes made    Labs:                        8.1    18.07 )-----------( 316      ( 2021 00:43 )             26.2         140  |  109<H>  |  14  ----------------------------<  153<H>  3.9   |  17<L>  |  0.85    Ca    8.8      2021 00:43  Phos  2.6       Mg     1.8         TPro  7.2  /  Alb  3.4  /  TBili  0.4  /  DBili  x   /  AST  76<H>  /  ALT  85<H>  /  AlkPhos  319<H>      PTT - ( 2021 08:23 )  PTT:45.9 sec

## 2021-06-25 NOTE — PROGRESS NOTE ADULT - PROBLEM SELECTOR PLAN 3
- given worsening SIRS/sepsis, agree w/ abxs for VAP tx  - f/u sputum cx sensitivities  - c/w tx acute Cdiff, as above

## 2021-06-25 NOTE — PROGRESS NOTE ADULT - ASSESSMENT
imp/rx:  Possible aspiration pneumonitis at time of endoscopic procedure  Completed a course of therapy with Zosyn      Recent course complicated by C.diff diarrhea    Abdomen somewhat distended- but soft  Agree with Vanco oral 500mg qid  Continue IV flagyl 500mg q 8hr    Leukocytosis remains in the moderate range  low grade fevers  would send blood cultures x 2 sets   start IV Cefepime 2 gm IV q 8hr    Morris Prater MD  [phone]  After 5pm/weekends [phone]

## 2021-06-25 NOTE — PROGRESS NOTE ADULT - SUBJECTIVE AND OBJECTIVE BOX
RICKY JOINT  MRN#: [id number]  Subjective:  Pulmonary progress  : recurrent Acute hypoxic respiratory Failure ,aspiration pneumonia, NICM  , chart reviewed and H/O obtained radiological and Laboratory study reviewed patient Examined     65M PMH ACC/AHA stage D HF due to NICM HM2 LVAD , TV annuloplasty ring 17 as destination therapy due to severe peripheral artery disease with significant stenosis  SIADH, Depression, CKD-3 with hyperkalemia, past E. coli UTIs, driveline drainage (21) and COVID-19 (back in 2020)  He was recently seen in clinic where he complained of abdominal pain and dark stools w constipation back in May. He presents to [facility] ER today weakness and fatigue, moderate and + Black stools for three days, on coumadin secondary to warfarin use in the setting of an LVAD. Patient has required transfusions for GIB in the past. Mostly recently back in 2021 pt had anemia with dark stools. No interventions was done at that time. However Last Endoscopy was done in 2020 (negative). Today labs show patient is anemic with H/H of 4.5/16.3,. INR is 8.84 MAP in the 90s, Temp 35.1. He denies any chest pain, shortness of breath, dizziness, abd pain, nausea or vomiting. found to have  rectal bleeding underwent endoscopy ,old blood in the proximal ileum ,  develop sepsis with LL opacity given Antibiotics , Extubated , reintubated , Bronchoscopy on Zosyn for LL pneumonia  and Amiodarone S/P TV Annuloplasty , patient remain intubated on full ventilatory support .S/P multiple units of blood transfusion , remain on full ventilatory support on Precedex and propofol , new central IJ line , diarrhea C diff. +ve on po vancomycin and IV Flagyl,  mildly distended belly , fever start on cefepime 2gm q 8 hrs S/P tracheostomy .   Abdomen and Pelvis w/ IV Cont (21 @ 10:44) >  IMPRESSION:  Fluid-filled colon which may be secondary to rapid transit.    Small bilateral pleural effusions with associated compressive atelectasis..    New left upper and lower lobe parenchymal opacities, suspicious for pneumonia.    Moderate stenosis in the proximal superior mesenteric artery         (2021 16:57)    PAST MEDICAL & SURGICAL HISTORY:  CHF (congestive heart failure)    CAD (coronary artery disease)  Depression    Pleural effusion    History of 2019 novel coronavirus disease (COVID-19)  2020    Hemorrhoids    Bleeding hemorrhoids    Peripheral arterial disease    Claudication    BPH with urinary obstruction    ACC/AHA stage D systolic heart failure  Anticoagulation goal of INR 2.0 to 2.5    Falls    Clavicle fracture    CKD (chronic kidney disease), stage III    Iron deficiency anemia    H/O epistaxis    Vertigo    GI bleed    S/P TVR (tricuspid valve repair)    S/P ventricular assist device    S/P endoscopy    OBJECTIVE:  ICU Vital Signs Last 24 Hrs  T(C): 38.2 (2021 10:00), Max: 38.5 (2021 12:00)  T(F): 100.8 (2021 10:00), Max: 101.3 (2021 12:00)  HR: 65 (2021 10:00) (61 - 69)  BP: --  BP(mean): --  ABP: 105/67 (2021 10:00) (90/54 - 113/64)  ABP(mean): 77 (2021 10:00) (63 - 77)  RR: 20 (2021 10:00) (19 - 35)  SpO2: 99% (2021 10:00) (96% - 100%)       @ : @ 07:00  --------------------------------------------------------  IN: 2693.9 mL / OUT: 1415 mL / NET: 1278.9 mL     @ 07: @ 10:49  --------------------------------------------------------  IN: 420.8 mL / OUT: 115 mL / NET: 305.8 mL  PHYSICAL EXAM:Daily   Elderly male S/P tracheostomy  sedated  on full ventilatory support /40%20/5cm PEEP  0n vasopressin   Daily Weight in k.4 (2021 00:00)  HEENT:     + NCAT  + EOMI  - Conjuctival edema   - Icterus   - Thrush   - ETT  + NGT/OGT  Neck:         + FROM   LT  IJ  JVD     - Nodes     - Masses    + Mid-line trachea + Tracheostomy  Chest: normal findings  Lungs:          + CTA   + Rhonchi    - Rales    - Wheezing     - Decreased BS   - Dullness R L  Cardiac:       + S1 + S2    + RRR   - Irregular   - S3  - S4    - Murmurs   - Rub   - Hamman’s sign   Abdomen:    + BS     + Soft    + Non-tender     - Distended    - Organomegaly  - PEG  Extremities:   - Cyanosis U/L   - Clubbing  U/L  + LE/UE Edema   + Capillary refill    + Pulses   Neuro:        - Awake   -  Alert   - Confused   - Lethargic   + Sedated  + Generalized Weakness  Skin:        - Rashes    - Erythema   + Normal incisions   + IV sites intact          HOSPITAL MEDICATIONS: All mediciations reviewed and analyzed  MEDICATIONS  (STANDING):  aMIOdarone    Tablet 200 milliGRAM(s) Oral daily  chlorhexidine 0.12% Liquid 15 milliLiter(s) Oral Mucosa every 12 hours  chlorhexidine 2% Cloths 1 Application(s) Topical <User Schedule>  dexMEDEtomidine Infusion 0.5 MICROgram(s)/kG/Hr (9.81 mL/Hr) IV Continuous <Continuous>  dextrose 50% Injectable 50 milliLiter(s) IV Push every 15 minutes  heparin  Infusion 400 Unit(s)/Hr (12.5 mL/Hr) IV Continuous <Continuous>  HYDROmorphone  Injectable 0.5 milliGRAM(s) IV Push once  insulin lispro (ADMELOG) corrective regimen sliding scale   SubCutaneous every 6 hours  pantoprazole  Injectable 40 milliGRAM(s) IV Push every 12 hours  piperacillin/tazobactam IVPB.. 3.375 Gram(s) IV Intermittent every 8 hours  propofol Infusion 20 MICROgram(s)/kG/Min (9.42 mL/Hr) IV Continuous <Continuous>  sodium chloride 0.9% lock flush 3 milliLiter(s) IV Push every 8 hours  sodium chloride 0.9%. 1000 milliLiter(s) (10 mL/Hr) IV Continuous <Continuous>    MEDICATIONS  (PRN):  acetaminophen    Suspension .. 650 milliGRAM(s) Oral every 6 hours PRN Temp greater or equal to 38C (100.4F)    LABS: All Lab data reviewed and analyzed                        8. )-----------( 316      ( 2021 00:43 )             26.2     06-    140  |  109<H>  |  14  ----------------------------<  153<H>  3.9   |  17<L>  |  0.85    Ca    8.8      2021 00:43  Phos  2.6     06-  Mg     1.8         TPro  7.2  /  Alb  3.4  /  TBili  0.4  /  DBili  x   /  AST  76<H>  /  ALT  85<H>  /  AlkPhos  319<H>      Ca    9.0      2021 00:11  Phos  3.0     06-  Mg     1.8         TPro  6.9  /  Alb  2.9<L>  /  TBili  0.5  /  DBili  x   /  AST  108<H>  /  ALT  97<H>  /  AlkPhos  294<H>        Ca    9.2      2021 01:00  Phos  2.2     06-  Mg     2.1         TPro  6.9  /  Alb  3.3  /  TBili  0.8  /  DBili  x   /  AST  142<H>  /  ALT  87<H>  /  AlkPhos  244<H>               PTT - ( 2021 04:52 )  PTT:45.2 sec LIVER FUNCTIONS - ( 2021 00:42 )  Alb: 3.4 g/dL / Pro: 6.7 g/dL / ALK PHOS: 213 U/L / ALT: 15 U/L / AST: 24 U/L / GGT: x           RADIOLOGY: - Reviewed and analyzed LLL  pneumonia , LVAD HM2, CT scan of abdomen reviewed result noted

## 2021-06-25 NOTE — PROGRESS NOTE ADULT - ASSESSMENT
64 YO M with PMH of ACC/AHA stage D HF due to NICM HM2 LVAD , TV annuloplasty ring 9/12/17 as destination therapy due to severe peripheral artery disease with significant stenosis  SIADH, Depression, CKD-3 with hyperkalemia, past E. coli UTIs, driveline drainage (1/7/21) and COVID-19 (back in April 2020), admitted for weakness and fatigue, was found to have CAD S/P CABG on 6/18. ENT called to evaluate for tracheostomy. Pt failed weaning trials and is not a candidate for extubation as per primary team. Now S/P # 8 DCT Tracheostomy POD # 0. Surgicel x 1 placed around the stoma in OR.  Doing well on the ventilator. No bleeding noted.  64 YO M with PMH of ACC/AHA stage D HF due to NICM HM2 LVAD admitted for weakness and fatigue, was found to have CAD S/P CABG on 6/18. ENT called to evaluate for tracheostomy. Pt failed weaning trials and is not a candidate for extubation as per primary team. Now S/P # 8 DCT Tracheostomy POD # 0. Surgicel x 1 placed around the stoma in OR.  Doing well on the ventilator. No bleeding noted.

## 2021-06-25 NOTE — PROGRESS NOTE ADULT - SUBJECTIVE AND OBJECTIVE BOX
ENT ISSUE/POD:   S/P #8 DCT Tracheostomy POD # 0.    HPI: 64 YO M with PMH of ACC/AHA stage D HF due to NICM HM2 LVAD , TV annuloplasty ring 9/12/17 as destination therapy due to severe peripheral artery disease with significant stenosis  SIADH, Depression, CKD-3 with hyperkalemia, past E. coli UTIs, driveline drainage (1/7/21) and COVID-19 (back in April 2020), admitted for weakness and fatigue, was found to have CAD S/P CABG on 6/18. ENT called to evaluate for tracheostomy. Pt failed weaning trials and is not a candidate for extubation as per primary team. Now S/P # 8 DCT Tracheostomy POD # 0. Surgicel x1  placed around the stoma in OR.  Doing well on the ventilator. No bleeding noted.  PEEP 5, FiO2 40%.     PAST MEDICAL & SURGICAL HISTORY:  CHF (congestive heart failure)  CAD (coronary artery disease)  Depression  Pleural effusion  History of 2019 novel coronavirus disease (COVID-19) april 2020  Hemorrhoids  Bleeding hemorrhoids  Peripheral arterial disease  Claudication  BPH with urinary obstruction  ACC/AHA stage D systolic heart failure  Anticoagulation goal of INR 2.0 to 2.5  Falls Clavicle fracture  CKD (chronic kidney disease), stage III  Iron deficiency anemia  H/O epistaxis  Vertigo  GI bleed  S/P TVR (tricuspid valve repair)  S/P ventricular assist device  S/P endoscopy    Allergies  No Known Allergies    Intolerances  MEDICATIONS  (STANDING):  aMIOdarone    Tablet 200 milliGRAM(s) Oral daily  cefepime   IVPB 1000 milliGRAM(s) IV Intermittent every 8 hours  chlorhexidine 0.12% Liquid 15 milliLiter(s) Oral Mucosa every 12 hours  chlorhexidine 2% Cloths 1 Application(s) Topical <User Schedule>  clonazePAM  Tablet 1 milliGRAM(s) Oral every 8 hours  dexMEDEtomidine Infusion 0.5 MICROgram(s)/kG/Hr (9.81 mL/Hr) IV Continuous <Continuous>  heparin  Infusion 400 Unit(s)/Hr (12 mL/Hr) IV Continuous <Continuous>  HYDROmorphone  Injectable 0.5 milliGRAM(s) IV Push once  insulin lispro (ADMELOG) corrective regimen sliding scale   SubCutaneous every 6 hours  ketamine Infusion. 0.501 mG/kG/Hr (3.93 mL/Hr) IV Continuous <Continuous>  metoclopramide Injectable 10 milliGRAM(s) IV Push every 8 hours  metroNIDAZOLE  IVPB 500 milliGRAM(s) IV Intermittent every 8 hours  pantoprazole  Injectable 40 milliGRAM(s) IV Push every 12 hours  propofol Infusion 20 MICROgram(s)/kG/Min (9.42 mL/Hr) IV Continuous <Continuous>  sodium chloride 0.9%. 1000 milliLiter(s) (10 mL/Hr) IV Continuous <Continuous>  vancomycin    Solution 500 milliGRAM(s) Oral every 6 hours  veCURonium  Injectable 5 milliGRAM(s) IV Push once    MEDICATIONS  (PRN):  acetaminophen    Suspension .. 650 milliGRAM(s) Oral every 6 hours PRN Temp greater or equal to 38C (100.4F)    Social History: SEE CONSULT.   Family history: SEE CONSULT.  ROS:   ENT: all negative except as noted in HPI    Vital Signs Last 24 Hrs  T(C): 36.5 (25 Jun 2021 20:00), Max: 38 (25 Jun 2021 00:00)  T(F): 97.7 (25 Jun 2021 20:00), Max: 100.4 (25 Jun 2021 00:00)  HR: 61 (25 Jun 2021 23:00) (55 - 79)  BP: --  BP(mean): --  RR: 14 (25 Jun 2021 23:00) (13 - 35)  SpO2: 100% (25 Jun 2021 23:00) (98% - 100%)                          8.1    18.07 )-----------( 316      ( 25 Jun 2021 00:43 )             26.2    06-25    140  |  109<H>  |  14  ----------------------------<  153<H>  3.9   |  17<L>  |  0.85    Ca    8.8      25 Jun 2021 00:43  Phos  2.6     06-25  Mg     1.8     06-25  TPro  7.2  /  Alb  3.4  /  TBili  0.4  /  DBili  x   /  AST  76<H>  /  ALT  85<H>  /  AlkPhos  319<H>  06-25   PTT - ( 25 Jun 2021 08:23 )  PTT:45.9 sec    PHYSICAL EXAM:  Gen: NAD, On Vent.   Skin: No rashes, bruises, or lesions.  Head: Normocephalic, Atraumatic.  Face: no edema, erythema, or fluctuance. Parotid glands soft without mass.  Eyes: no scleral injection.  Nose: Nares bilaterally patent, no discharge  Mouth: No Stridor / Drooling / Trismus.  Mucosa moist, tongue/uvula midline, oropharynx clear  Neck: # 8 DCT Trach secured with sutures x 4 and Umbilical Tie. Surgicel x1 around the stoma.  Minimal oozing of serosanguinous secretions,.  No bleeding noted. No hematoma/crepitus. Flat, supple, no lymphadenopathy, trachea midline, no masses.  Lymphatic: No lymphadenopathy  Resp: On Vent.   Neuro: Unable to obtain.

## 2021-06-25 NOTE — PROGRESS NOTE ADULT - ASSESSMENT
Assessment and Recommendation:   · Assessment	  Assessment and recommendation :  Recurrent Acute hypoxic respiratory Failure S/P tracheostomy on full ventilatory support   Acute left lower lobe pneumonia  possible Aspiration pneumonia   Diarrhea +ve for C-diff. colitis on PO vancomycin and IV Flagyl   recurrent fever start on cefepime   CT scan of abdomen negative for toxic megacolon    Non ischemic cardiomyopathy continue ACE inhibitor and B-Blockers   Septic shock and cardiogenic shock   Stage D systolic heart failure S/P LVAD HM2   MH2 LVAD  with  TV Annuloplasty  Severe peripheral vascular disease   Anion gap metabolic acidosis  severe hyperglycemia on insulin coverage    On  Amiodarone and IV heparin   critical care polyneuropathy   Anemia of Acute blood Loss   S/P Small bowel bleeding   S/P blood and FFP transfusion   Chronic kidney disease stage III  Continue NGT feeding   GI prophylaxis  discuss with TCV surgeon   critical care time spend is 35 minutes

## 2021-06-25 NOTE — PROGRESS NOTE ADULT - ASSESSMENT
66 YO M with a history of stage D NICM s/p HM2 on 9/2017 as DT (due to severe PAD) with TV ring, prior COVID-19 infection 4/2020, recurrent syncope post LVAD s/p ILR, and chronic abdominal pain with prior negative workup who was admitted with symptomatic anemia with Hgb 4.5 in setting of INR 8.8 without hemodynamic instability. He was transfused and underwent VCE which showed active bleeding in the mid small bowel but subsequent enteroscopy 6/15 did not reveal any active bleeding. He acutely decompensated after procedure with fever/hypertension, low flow alarms, and pulmonary infiltrate with hypoxia requiring intubation from probable aspiration PNA. His LDH is normal and there are no signs of overt LVAD dysfunction on echo though signs of insufficiently unloaded ventricle for which we increased speed on 6/19.    He remains on ventilator although CXR is improving. He also has acute Cdiff colitis and is currently being tx w/ vanco PO and metronidazole. There is concern for worsening VAP.

## 2021-06-25 NOTE — PROGRESS NOTE ADULT - SUBJECTIVE AND OBJECTIVE BOX
RICKY HAMPTON  MRN-[id number]  Patient is a 66y old  Male who presents with a chief complaint of Anemia, Supratherapeutic INR, Dark Stools (2021 18:28)    HPI:  65M PMH ACC/AHA stage D HF due to NICM HM2 LVAD , TV annuloplasty ring 17 as destination therapy due to severe peripheral artery disease with significant stenosis  SIADH, Depression, CKD-3 with hyperkalemia, past E. coli UTIs, driveline drainage (21) and COVID-19 (back in 2020)  He was recently seen in clinic where he complained of abdominal pain and dark stools w constipation back in May. He presents to [facility] ER today weakness and fatigue, moderate and + Black stools for three days, on coumadin secondary to warfarin use in the setting of an LVAD. Patient has required transfusions for GIB in the past. Mostly recently back in 2021 pt had anemia with dark stools. No interventions was done at that time. However Last Endoscopy was done in 2020 (negative). Today labs show patient is anemic with H/H of 4.5/16.3,. INR is 8.84 MAP in the 90s, Temp 35.1. He denies any chest pain, shortness of breath, dizziness, abd pain, nausea or vomiting.       (2021 16:57)      Surgery/Hospital course:   admit for melena w/ anemia, INR 8.84   6/15 Capsul study (+) for small bowel bleed, balloon endoscopy (old blood in prox ileum); post EGD - septic w/ L opacity, re-intubated for concern for aspiration, TTE (Mod MR, decrease biV w/ interventricular septum boweing towards R)   bronch    TC     Today/Overnight:  Pt w/ PMH of ACC/AHA stage D HF started Heparin and Propofol drips again. He has a low grade fever of 100.4F.    Vital Signs Last 24 Hrs  T(C): 36.5 (2021 18:00), Max: 38 (2021 00:00)  T(F): 97.7 (2021 18:00), Max: 100.4 (2021 00:00)  HR: 56 (2021 19:00) (55 - 79)  BP: --  BP(mean): --  RR: 15 (2021 19:00) (14 - 36)  SpO2: 100% (2021 19:00) (97% - 100%)  ============================I/O===========================  I&O's Detail    2021 07:01  -  2021 07:00  --------------------------------------------------------  IN:    Dexmedetomidine: 258.8 mL    Dexmedetomidine: 233.4 mL    Enteral Tube Flush: 350 mL    Heparin: 276 mL    IV PiggyBack: 400 mL    Ketamine: 237.3 mL    Ketamine: 182.7 mL    Miscellaneous Tube Feedin mL    sodium chloride 0.9%: 160 mL  Total IN: 3053.2 mL    OUT:    Indwelling Catheter - Urethral (mL): 2080 mL  Total OUT: 2080 mL    Total NET: 973.2 mL      2021 07:01  -  2021 19:41  --------------------------------------------------------  IN:    Dexmedetomidine: 347 mL    Enteral Tube Flush: 80 mL    Heparin: 29 mL    IV PiggyBack: 350 mL    Ketamine: 125.4 mL    Miscellaneous Tube Feeding: 10 mL    Propofol: 94.2 mL    sodium chloride 0.9%: 120 mL  Total IN: 1155.6 mL    OUT:    Indwelling Catheter - Urethral (mL): 1845 mL    Nasogastric/Oral tube (mL): 250 mL  Total OUT: 2095 mL    Total NET: -939.4 mL        ============================ LABS =========================                        8.1    18.07 )-----------( 316      ( 2021 00:43 )             26.2         140  |  109<H>  |  14  ----------------------------<  153<H>  3.9   |  17<L>  |  0.85    Ca    8.8      2021 00:43  Phos  2.6       Mg     1.8         TPro  7.2  /  Alb  3.4  /  TBili  0.4  /  DBili  x   /  AST  76<H>  /  ALT  85<H>  /  AlkPhos  319<H>      LIVER FUNCTIONS - ( 2021 00:43 )  Alb: 3.4 g/dL / Pro: 7.2 g/dL / ALK PHOS: 319 U/L / ALT: 85 U/L / AST: 76 U/L / GGT: x           PTT - ( 2021 08:23 )  PTT:45.9 sec  ABG - ( 2021 18:35 )  pH, Arterial: 7.35  pH, Blood: x     /  pCO2: 38    /  pO2: 137   / HCO3: 20    / Base Excess: -4.2  /  SaO2: 99                  ======================Micro/Rad/Cardio=================  Culture: Reviewed   CXR: Reviewed  Echo: Reviewed  ======================================================  PAST MEDICAL & SURGICAL HISTORY:  CHF (congestive heart failure)    CAD (coronary artery disease)    Depression    Pleural effusion    History of 2019 novel coronavirus disease (COVID-19)  2020    Hemorrhoids    Bleeding hemorrhoids    Peripheral arterial disease    Claudication    BPH with urinary obstruction    ACC/AHA stage D systolic heart failure    Anticoagulation goal of INR 2.0 to 2.5    Falls    Clavicle fracture    CKD (chronic kidney disease), stage III    Iron deficiency anemia    H/O epistaxis    Vertigo    GI bleed    S/P TVR (tricuspid valve repair)    S/P ventricular assist device    S/P endoscopy      ========================ASSESSMENT ================  Stage D Nonischemic Cardiomyopathy, Status Post HM2 on 2017   Recent driveline infection on 2021   Acute hypoxemic respiratory failure  Aspiration pneumonia  Septic shock  Cardiogenic shock  GI bleed , Status Post Enteroscopy   Anemia, in setting of melena   Chronic Kidney Disease  Leukocytosis  Coagulopathy   Stress hyperglycemia   Hemodynamic instability   Hypovolemic shock  C.diff positive on      Plan:  ====================== NEUROLOGY=====================  Sedated with IV Precedex, IV Propofol, and Ketamine drips for ventilator synchrony.  Continue monitoring neuro status   Clonazepam for anxiety  Tylenol PRN fevers     acetaminophen    Suspension .. 650 milliGRAM(s) Oral every 6 hours PRN Temp greater or equal to 38C (100.4F)  clonazePAM  Tablet 1 milliGRAM(s) Oral every 8 hours  dexMEDEtomidine Infusion 0.5 MICROgram(s)/kG/Hr (9.81 mL/Hr) IV Continuous <Continuous>  ketamine Infusion. 0.501 mG/kG/Hr (3.93 mL/Hr) IV Continuous <Continuous>  propofol Infusion 20 MICROgram(s)/kG/Min (9.42 mL/Hr) IV Continuous <Continuous>    ==================== RESPIRATORY======================  On full vent support, requiring close monitoring of respiratory rate, breathing pattern, pulse oximetry monitoring, and intermittent blood gas analysis.     Mechanical Ventilation:  Mode: AC/ CMV (Assist Control/ Continuous Mandatory Ventilation)  RR (machine): 12  TV (machine): 450  FiO2: 40  PEEP: 8  ITime: 1  MAP: 14  PIP: 28      ====================CARDIOVASCULAR==================  Stage D NICM, S/P HM2 on 2017; recent driveline infection on 2021; HM2 settings: 9000 rpm, flow 4.  TTE 6/15: severe global LV systolic dysfunction with HM2 in place, decreased RV systolic function, interventricular septums bows slightly to right, otherwise grossly similar to prior.   Continue invasive hemodynamic monitoring   Rate control with Amiodarone    aMIOdarone    Tablet 200 milliGRAM(s) Oral daily    ===================HEMATOLOGIC/ONC ===================  Acute blood loss anemia, monitor H&H/Plts    Continue AC therapy with IV Heparin for HM2 circuit and VTE prophylaxis      heparin  Infusion 400 Unit(s)/Hr (12 mL/Hr) IV Continuous <Continuous>    ===================== RENAL =========================  Continue to monitor I/Os, BUN/Creatinine, and urine output.   Goal net negative fluid balance. Replete lytes PRN. Keep K> 4 and Mg >2.     ==================== GASTROINTESTINAL===================  Tolerating tube feeds, Vital AF @ 60cc/hr   +C.diff on , also with enteral feed intolerance, f/u w/ CT scan for further evaluation   Continue Protonix for stress ulcer prophylaxis.   Reglan for gut motility       metoclopramide Injectable 10 milliGRAM(s) IV Push every 8 hours  pantoprazole  Injectable 40 milliGRAM(s) IV Push every 12 hours  sodium chloride 0.9%. 1000 milliLiter(s) (10 mL/Hr) IV Continuous <Continuous>    =======================    ENDOCRINE  =====================  insulin lispro (ADMELOG) corrective regimen sliding scale   SubCutaneous every 6 hours    ========================INFECTIOUS DISEASE================  Low grade fever of 100.4F, WBC WBC rising 11.27->12.87-> 18.07  Continue trending WBC and monitoring fever curve   +C.diff on , c/w vancomycin, Metronidazole, and Cefepime    cefepime   IVPB 1000 milliGRAM(s) IV Intermittent every 8 hours  metroNIDAZOLE  IVPB 500 milliGRAM(s) IV Intermittent every 8 hours  vancomycin    Solution 500 milliGRAM(s) Oral every 6 hours      Patient requires continuous monitoring with bedside rhythm monitoring, pulse oximetry monitoring, and continuous central venous and arterial pressure monitoring; and intermittent blood gas analysis.  Care plan discussed with ICU care team.    Patient remained critical, at risk for life threatening decompensation.   I have spent 35 minutes providing acute care with multiple re-evaluations throughout the evening.     By signing my name below, I, Daniela Chowdary, attest that this documentation has been prepared under the direction and in the presence of Aroldo Wright NP.  Electronically signed: Cesia Suresh, 21 @ 19:41    I, Aroldo Wright NP, personally performed the services described in this documentation. All medical record entries made by the luisibmel were at my direction and in my presence. I have reviewed the chart and agree that the record reflects my personal performance and is accurate and complete  Electronically signed: Aroldo Wright NP, 21 @ 19:41       RICKY HAMPTON  MRN-[id number]  Patient is a 66y old  Male who presents with a chief complaint of Anemia, Supratherapeutic INR, Dark Stools (2021 18:28)    HPI:  65M PMH ACC/AHA stage D HF due to NICM HM2 LVAD , TV annuloplasty ring 17 as destination therapy due to severe peripheral artery disease with significant stenosis  SIADH, Depression, CKD-3 with hyperkalemia, past E. coli UTIs, driveline drainage (21) and COVID-19 (back in 2020)  He was recently seen in clinic where he complained of abdominal pain and dark stools w constipation back in May. He presents to [facility] ER today weakness and fatigue, moderate and + Black stools for three days, on coumadin secondary to warfarin use in the setting of an LVAD. Patient has required transfusions for GIB in the past. Mostly recently back in 2021 pt had anemia with dark stools. No interventions was done at that time. However Last Endoscopy was done in 2020 (negative). Today labs show patient is anemic with H/H of 4.5/16.3,. INR is 8.84 MAP in the 90s, Temp 35.1. He denies any chest pain, shortness of breath, dizziness, abd pain, nausea or vomiting.       (2021 16:57)      Surgery/Hospital course:   admit for melena w/ anemia, INR 8.84   6/15 Capsul study (+) for small bowel bleed, balloon endoscopy (old blood in prox ileum); post EGD - septic w/ L opacity, re-intubated for concern for aspiration, TTE (Mod MR, decrease biV w/ interventricular septum boweing towards R)   bronch    TC     Today/Overnight:  Pt w/ PMH of ACC/AHA stage D HF started Heparin and Propofol drips again. He has a low grade fever of 100.4F.    Vital Signs Last 24 Hrs  T(C): 36.5 (2021 18:00), Max: 38 (2021 00:00)  T(F): 97.7 (2021 18:00), Max: 100.4 (2021 00:00)  HR: 56 (2021 19:00) (55 - 79)  BP: --  BP(mean): --  RR: 15 (2021 19:00) (14 - 36)  SpO2: 100% (2021 19:00) (97% - 100%)  ============================I/O===========================  I&O's Detail    2021 07:01  -  2021 07:00  --------------------------------------------------------  IN:    Dexmedetomidine: 258.8 mL    Dexmedetomidine: 233.4 mL    Enteral Tube Flush: 350 mL    Heparin: 276 mL    IV PiggyBack: 400 mL    Ketamine: 237.3 mL    Ketamine: 182.7 mL    Miscellaneous Tube Feedin mL    sodium chloride 0.9%: 160 mL  Total IN: 3053.2 mL    OUT:    Indwelling Catheter - Urethral (mL): 2080 mL  Total OUT: 2080 mL    Total NET: 973.2 mL      2021 07:01  -  2021 19:41  --------------------------------------------------------  IN:    Dexmedetomidine: 347 mL    Enteral Tube Flush: 80 mL    Heparin: 29 mL    IV PiggyBack: 350 mL    Ketamine: 125.4 mL    Miscellaneous Tube Feeding: 10 mL    Propofol: 94.2 mL    sodium chloride 0.9%: 120 mL  Total IN: 1155.6 mL    OUT:    Indwelling Catheter - Urethral (mL): 1845 mL    Nasogastric/Oral tube (mL): 250 mL  Total OUT: 2095 mL    Total NET: -939.4 mL        ============================ LABS =========================                        8.1    18.07 )-----------( 316      ( 2021 00:43 )             26.2         140  |  109<H>  |  14  ----------------------------<  153<H>  3.9   |  17<L>  |  0.85    Ca    8.8      2021 00:43  Phos  2.6       Mg     1.8         TPro  7.2  /  Alb  3.4  /  TBili  0.4  /  DBili  x   /  AST  76<H>  /  ALT  85<H>  /  AlkPhos  319<H>      LIVER FUNCTIONS - ( 2021 00:43 )  Alb: 3.4 g/dL / Pro: 7.2 g/dL / ALK PHOS: 319 U/L / ALT: 85 U/L / AST: 76 U/L / GGT: x           PTT - ( 2021 08:23 )  PTT:45.9 sec  ABG - ( 2021 18:35 )  pH, Arterial: 7.35  pH, Blood: x     /  pCO2: 38    /  pO2: 137   / HCO3: 20    / Base Excess: -4.2  /  SaO2: 99                  ======================Micro/Rad/Cardio=================  Culture: Culture - Blood (21 @ 22:49)    Specimen Source: .Blood Blood    Culture Results:   No growth to date.      CXR: Reviewed  Echo: Reviewed  ======================================================  PAST MEDICAL & SURGICAL HISTORY:  CHF (congestive heart failure)    CAD (coronary artery disease)    Depression    Pleural effusion    History of 2019 novel coronavirus disease (COVID-19)  2020    Hemorrhoids    Bleeding hemorrhoids    Peripheral arterial disease    Claudication    BPH with urinary obstruction    ACC/AHA stage D systolic heart failure    Anticoagulation goal of INR 2.0 to 2.5    Falls    Clavicle fracture    CKD (chronic kidney disease), stage III    Iron deficiency anemia    H/O epistaxis    Vertigo    GI bleed    S/P TVR (tricuspid valve repair)    S/P ventricular assist device    S/P endoscopy      ========================ASSESSMENT ================  Stage D Nonischemic Cardiomyopathy, Status Post HM2 on 2017   Recent driveline infection on 2021   Acute hypoxemic respiratory failure  Aspiration pneumonia  Septic shock  Cardiogenic shock  GI bleed , Status Post Enteroscopy   Anemia, in setting of melena   Chronic Kidney Disease  Leukocytosis  Coagulopathy   Stress hyperglycemia   Hemodynamic instability   Hypovolemic shock  C.diff positive on      Plan:  ====================== NEUROLOGY=====================  Sedated with IV Precedex, IV Propofol, and Ketamine drips for ventilator synchrony.  Continue monitoring neuro status   Clonazepam for anxiety  Tylenol PRN fevers     acetaminophen    Suspension .. 650 milliGRAM(s) Oral every 6 hours PRN Temp greater or equal to 38C (100.4F)  clonazePAM  Tablet 1 milliGRAM(s) Oral every 8 hours  dexMEDEtomidine Infusion 0.5 MICROgram(s)/kG/Hr (9.81 mL/Hr) IV Continuous <Continuous>  ketamine Infusion. 0.501 mG/kG/Hr (3.93 mL/Hr) IV Continuous <Continuous>  propofol Infusion 20 MICROgram(s)/kG/Min (9.42 mL/Hr) IV Continuous <Continuous>    ==================== RESPIRATORY======================  On full vent support, requiring close monitoring of respiratory rate, breathing pattern, pulse oximetry monitoring, and intermittent blood gas analysis.     Mechanical Ventilation:  Mode: AC/ CMV (Assist Control/ Continuous Mandatory Ventilation)  RR (machine): 12  TV (machine): 450  FiO2: 40  PEEP: 8  ITime: 1  MAP: 14  PIP: 28      ====================CARDIOVASCULAR==================  Stage D NICM, S/P HM2 on 2017; recent driveline infection on 2021; HM2 settings: 9000 rpm, flow 4.  TTE 6/15: severe global LV systolic dysfunction with HM2 in place, decreased RV systolic function, interventricular septums bows slightly to right, otherwise grossly similar to prior.   Continue invasive hemodynamic monitoring   Rate control with Amiodarone    aMIOdarone    Tablet 200 milliGRAM(s) Oral daily    ===================HEMATOLOGIC/ONC ===================  Acute blood loss anemia, monitor H&H/Plts    Continue AC therapy with IV Heparin for HM2 circuit and VTE prophylaxis      heparin  Infusion 400 Unit(s)/Hr (12 mL/Hr) IV Continuous <Continuous>    ===================== RENAL =========================  Continue to monitor I/Os, BUN/Creatinine, and urine output.   Goal net negative fluid balance. Replete lytes PRN. Keep K> 4 and Mg >2.     ==================== GASTROINTESTINAL===================  Tolerating tube feeds, Vital AF @ 60cc/hr   +C.diff on , also with enteral feed intolerance, f/u w/ CT scan for further evaluation   Continue Protonix for stress ulcer prophylaxis.   Reglan for gut motility       metoclopramide Injectable 10 milliGRAM(s) IV Push every 8 hours  pantoprazole  Injectable 40 milliGRAM(s) IV Push every 12 hours  sodium chloride 0.9%. 1000 milliLiter(s) (10 mL/Hr) IV Continuous <Continuous>    =======================    ENDOCRINE  =====================  insulin lispro (ADMELOG) corrective regimen sliding scale   SubCutaneous every 6 hours    ========================INFECTIOUS DISEASE================  Low grade fever of 100.4F, WBC WBC rising 11.27->12.87-> 18.07  Continue trending WBC and monitoring fever curve   +C.diff on , c/w vancomycin, Metronidazole, and Cefepime    cefepime   IVPB 1000 milliGRAM(s) IV Intermittent every 8 hours  metroNIDAZOLE  IVPB 500 milliGRAM(s) IV Intermittent every 8 hours  vancomycin    Solution 500 milliGRAM(s) Oral every 6 hours      Patient requires continuous monitoring with bedside rhythm monitoring, pulse oximetry monitoring, and continuous central venous and arterial pressure monitoring; and intermittent blood gas analysis.  Care plan discussed with ICU care team.    Patient remained critical, at risk for life threatening decompensation.   I have spent 35 minutes providing acute care with multiple re-evaluations throughout the evening.     By signing my name below, I, Daniela Chowdary, attest that this documentation has been prepared under the direction and in the presence of Aroldo Wright NP.  Electronically signed: Cesia Suresh, 21 @ 19:41    I, Aroldo Wright NP, personally performed the services described in this documentation. All medical record entries made by the scribe were at my direction and in my presence. I have reviewed the chart and agree that the record reflects my personal performance and is accurate and complete  Electronically signed: Aroldo Wright NP, 21 @ 19:41       RICKY HAMPTON  MRN-[id number]  Patient is a 66y old  Male who presents with a chief complaint of Anemia, Supratherapeutic INR, Dark Stools (2021 18:28)    HPI:  65M PMH ACC/AHA stage D HF due to NICM HM2 LVAD , TV annuloplasty ring 17 as destination therapy due to severe peripheral artery disease with significant stenosis  SIADH, Depression, CKD-3 with hyperkalemia, past E. coli UTIs, driveline drainage (21) and COVID-19 (back in 2020)  He was recently seen in clinic where he complained of abdominal pain and dark stools w constipation back in May. He presents to [facility] ER today weakness and fatigue, moderate and + Black stools for three days, on coumadin secondary to warfarin use in the setting of an LVAD. Patient has required transfusions for GIB in the past. Mostly recently back in 2021 pt had anemia with dark stools. No interventions was done at that time. However Last Endoscopy was done in 2020 (negative). Today labs show patient is anemic with H/H of 4.5/16.3,. INR is 8.84 MAP in the 90s, Temp 35.1. He denies any chest pain, shortness of breath, dizziness, abd pain, nausea or vomiting.       (2021 16:57)      Surgery/Hospital course:   admit for melena w/ anemia, INR 8.84   6/15 Capsul study (+) for small bowel bleed, balloon endoscopy (old blood in prox ileum); post EGD - septic w/ L opacity, re-intubated for concern for aspiration, TTE (Mod MR, decrease biV w/ interventricular septum boweing towards R)   bronch    TC     Today/Overnight:  Pt w/ PMH of ACC/AHA stage D HF started Heparin and Propofol drips again. He has a low grade fever of 100.4F.    Vital Signs Last 24 Hrs  T(C): 36.5 (2021 18:00), Max: 38 (2021 00:00)  T(F): 97.7 (2021 18:00), Max: 100.4 (2021 00:00)  HR: 56 (2021 19:00) (55 - 79)  BP: --  BP(mean): --  RR: 15 (2021 19:00) (14 - 36)  SpO2: 100% (2021 19:00) (97% - 100%)  ============================I/O===========================  I&O's Detail    2021 07:01  -  2021 07:00  --------------------------------------------------------  IN:    Dexmedetomidine: 258.8 mL    Dexmedetomidine: 233.4 mL    Enteral Tube Flush: 350 mL    Heparin: 276 mL    IV PiggyBack: 400 mL    Ketamine: 237.3 mL    Ketamine: 182.7 mL    Miscellaneous Tube Feedin mL    sodium chloride 0.9%: 160 mL  Total IN: 3053.2 mL    OUT:    Indwelling Catheter - Urethral (mL): 2080 mL  Total OUT: 2080 mL    Total NET: 973.2 mL      2021 07:01  -  2021 19:41  --------------------------------------------------------  IN:    Dexmedetomidine: 347 mL    Enteral Tube Flush: 80 mL    Heparin: 29 mL    IV PiggyBack: 350 mL    Ketamine: 125.4 mL    Miscellaneous Tube Feeding: 10 mL    Propofol: 94.2 mL    sodium chloride 0.9%: 120 mL  Total IN: 1155.6 mL    OUT:    Indwelling Catheter - Urethral (mL): 1845 mL    Nasogastric/Oral tube (mL): 250 mL  Total OUT: 2095 mL    Total NET: -939.4 mL        ============================ LABS =========================                        8.1    18.07 )-----------( 316      ( 2021 00:43 )             26.2         140  |  109<H>  |  14  ----------------------------<  153<H>  3.9   |  17<L>  |  0.85    Ca    8.8      2021 00:43  Phos  2.6       Mg     1.8         TPro  7.2  /  Alb  3.4  /  TBili  0.4  /  DBili  x   /  AST  76<H>  /  ALT  85<H>  /  AlkPhos  319<H>      LIVER FUNCTIONS - ( 2021 00:43 )  Alb: 3.4 g/dL / Pro: 7.2 g/dL / ALK PHOS: 319 U/L / ALT: 85 U/L / AST: 76 U/L / GGT: x           PTT - ( 2021 08:23 )  PTT:45.9 sec  ABG - ( 2021 18:35 )  pH, Arterial: 7.35  pH, Blood: x     /  pCO2: 38    /  pO2: 137   / HCO3: 20    / Base Excess: -4.2  /  SaO2: 99                  ======================Micro/Rad/Cardio=================  Culture: Culture - Blood (21 @ 22:49)    Specimen Source: .Blood Blood    Culture Results:   No growth to date.      CXR: Reviewed  Echo: Reviewed  ======================================================  PAST MEDICAL & SURGICAL HISTORY:  CHF (congestive heart failure)    CAD (coronary artery disease)    Depression    Pleural effusion    History of 2019 novel coronavirus disease (COVID-19)  2020    Hemorrhoids    Bleeding hemorrhoids    Peripheral arterial disease    Claudication    BPH with urinary obstruction    ACC/AHA stage D systolic heart failure    Anticoagulation goal of INR 2.0 to 2.5    Falls    Clavicle fracture    CKD (chronic kidney disease), stage III    Iron deficiency anemia    H/O epistaxis    Vertigo    GI bleed    S/P TVR (tricuspid valve repair)    S/P ventricular assist device    S/P endoscopy      ========================ASSESSMENT ================  Stage D Nonischemic Cardiomyopathy, Status Post HM2 on 2017   Recent driveline infection on 2021   Acute hypoxemic respiratory failure  Aspiration pneumonia  Septic shock  Cardiogenic shock  GI bleed , Status Post Enteroscopy   Anemia, in setting of melena   Chronic Kidney Disease  Leukocytosis  Coagulopathy   Stress hyperglycemia   Hemodynamic instability   Hypovolemic shock  C.diff positive on      Plan:  ====================== NEUROLOGY=====================  Sedated with IV Precedex, IV Propofol, and titrating Ketamine drip to off   off Sedation moving all extremities not to command  Clonazepam for anxiety  Tylenol PRN fevers     acetaminophen    Suspension .. 650 milliGRAM(s) Oral every 6 hours PRN Temp greater or equal to 38C (100.4F)  clonazePAM  Tablet 1 milliGRAM(s) Oral every 8 hours  dexMEDEtomidine Infusion 0.5 MICROgram(s)/kG/Hr (9.81 mL/Hr) IV Continuous <Continuous>  ketamine Infusion. 0.501 mG/kG/Hr (3.93 mL/Hr) IV Continuous <Continuous>  propofol Infusion 20 MICROgram(s)/kG/Min (9.42 mL/Hr) IV Continuous <Continuous>    ==================== RESPIRATORY======================  On full vent support, requiring close monitoring of respiratory rate, breathing pattern, pulse oximetry monitoring, and intermittent blood gas analysis.     Mechanical Ventilation:  Mode: AC/ CMV (Assist Control/ Continuous Mandatory Ventilation)  RR (machine): 12  TV (machine): 450  FiO2: 40  PEEP: 8  ITime: 1  MAP: 14  PIP: 28      ====================CARDIOVASCULAR==================  Stage D NICM, S/P HM2 on 2017; recent driveline infection on 2021; HM2 settings: 9000 rpm, flow 4.  TTE 6/15: severe global LV systolic dysfunction with HM2 in place, decreased RV systolic function, interventricular septums bows slightly to right, otherwise grossly similar to prior.   Continue invasive hemodynamic monitoring   Rate control with Amiodarone    aMIOdarone    Tablet 200 milliGRAM(s) Oral daily    ===================HEMATOLOGIC/ONC ===================  Acute blood loss anemia, monitor H&H/Plts    Continue AC therapy with IV Heparin for HM2 circuit and VTE prophylaxis      heparin  Infusion 400 Unit(s)/Hr (12 mL/Hr) IV Continuous <Continuous>    ===================== RENAL =========================  Continue to monitor I/Os, BUN/Creatinine, and urine output.   Goal net negative fluid balance. Replete lytes PRN. Keep K> 4 and Mg >2.     ==================== GASTROINTESTINAL===================  Tolerating tube feeds, Vital AF @ 60cc/hr   +C.diff on , also with enteral feed intolerance, f/u w/ CT scan for further evaluation   Continue Protonix for stress ulcer prophylaxis.   Reglan for gut motility       metoclopramide Injectable 10 milliGRAM(s) IV Push every 8 hours  pantoprazole  Injectable 40 milliGRAM(s) IV Push every 12 hours  sodium chloride 0.9%. 1000 milliLiter(s) (10 mL/Hr) IV Continuous <Continuous>    =======================    ENDOCRINE  =====================  insulin lispro (ADMELOG) corrective regimen sliding scale   SubCutaneous every 6 hours    ========================INFECTIOUS DISEASE================  Low grade fever of 100.4F, WBC WBC rising 11.27->12.87-> 18.07  Continue trending WBC and monitoring fever curve   +C.diff on , c/w vancomycin, Metronidazole, and Cefepime    cefepime   IVPB 1000 milliGRAM(s) IV Intermittent every 8 hours  metroNIDAZOLE  IVPB 500 milliGRAM(s) IV Intermittent every 8 hours  vancomycin    Solution 500 milliGRAM(s) Oral every 6 hours  will  change  lines  & send blood cultures    Patient requires continuous monitoring with bedside rhythm monitoring, pulse oximetry monitoring, and continuous central venous and arterial pressure monitoring; and intermittent blood gas analysis.  Care plan discussed with ICU care team.    Patient remained critical, at risk for life threatening decompensation.   I have spent 35 minutes providing acute care with multiple re-evaluations throughout the evening.     By signing my name below, I, Daniela Chowdary, attest that this documentation has been prepared under the direction and in the presence of Aroldo Wright NP.  Electronically signed: Cesia Suresh, 21 @ 19:41    I, Aroldo Wright NP, personally performed the services described in this documentation. All medical record entries made by the luisibmel were at my direction and in my presence. I have reviewed the chart and agree that the record reflects my personal performance and is accurate and complete  Electronically signed: Aroldo Wright NP, 21 @ 19:41

## 2021-06-25 NOTE — PROGRESS NOTE ADULT - SUBJECTIVE AND OBJECTIVE BOX
INFECTIOUS DISEASES FOLLOW UP-- Anitra Prater  [phone]    This is a follow up note for this  66yMale with  Anemia  copious secretions        ROS:  CONSTITUTIONAL:  No fever, good appetite  CARDIOVASCULAR:  No chest pain or palpitations  RESPIRATORY:  No dyspnea  GASTROINTESTINAL:  No nausea, vomiting, diarrhea, or abdominal pain  GENITOURINARY:  No dysuria  NEUROLOGIC:  No headache,     Allergies    No Known Allergies    Intolerances        ANTIBIOTICS/RELEVANT:  antimicrobials  cefepime   IVPB 1000 milliGRAM(s) IV Intermittent every 8 hours  metroNIDAZOLE  IVPB 500 milliGRAM(s) IV Intermittent every 8 hours  vancomycin    Solution 500 milliGRAM(s) Oral every 6 hours    immunologic:    OTHER:  acetaminophen    Suspension .. 650 milliGRAM(s) Oral every 6 hours PRN  aMIOdarone    Tablet 200 milliGRAM(s) Oral daily  chlorhexidine 0.12% Liquid 15 milliLiter(s) Oral Mucosa every 12 hours  chlorhexidine 2% Cloths 1 Application(s) Topical <User Schedule>  clonazePAM  Tablet 1 milliGRAM(s) Oral every 8 hours  dexMEDEtomidine Infusion 0.5 MICROgram(s)/kG/Hr IV Continuous <Continuous>  heparin  Infusion 400 Unit(s)/Hr IV Continuous <Continuous>  insulin lispro (ADMELOG) corrective regimen sliding scale   SubCutaneous every 6 hours  ketamine Infusion. 0.501 mG/kG/Hr IV Continuous <Continuous>  metoclopramide Injectable 10 milliGRAM(s) IV Push every 8 hours  pantoprazole  Injectable 40 milliGRAM(s) IV Push every 12 hours  sodium chloride 0.9%. 1000 milliLiter(s) IV Continuous <Continuous>      Objective:  Vital Signs Last 24 Hrs  T(C): 36.5 (25 Jun 2021 18:00), Max: 38 (25 Jun 2021 00:00)  T(F): 97.7 (25 Jun 2021 18:00), Max: 100.4 (25 Jun 2021 00:00)  HR: 58 (25 Jun 2021 18:00) (55 - 79)  BP: --  BP(mean): 80 (24 Jun 2021 19:15) (80 - 80)  RR: 23 (25 Jun 2021 18:00) (14 - 47)  SpO2: 100% (25 Jun 2021 18:00) (97% - 100%)    PHYSICAL EXAM:  Constitutional:no acute distress, s/p trach with site  Eyes:ALONSO, EOMI  Ear/Nose/Throat: no oral lesions, 	  Respiratory: clear BL  Cardiovascular: S1S2  Gastrointestinal:soft, (+) BS, no tenderness  Extremities:no e/e/c  No Lymphadenopathy  IV sites not inflammed.    LABS:                        8.1    18.07 )-----------( 316      ( 25 Jun 2021 00:43 )             26.2     06-25    140  |  109<H>  |  14  ----------------------------<  153<H>  3.9   |  17<L>  |  0.85    Ca    8.8      25 Jun 2021 00:43  Phos  2.6     06-25  Mg     1.8     06-25    TPro  7.2  /  Alb  3.4  /  TBili  0.4  /  DBili  x   /  AST  76<H>  /  ALT  85<H>  /  AlkPhos  319<H>  06-25    PTT - ( 25 Jun 2021 08:23 )  PTT:45.9 sec      MICROBIOLOGY:            RECENT CULTURES:  06-23 @ 22:49  .Blood Blood  --  --  --    No growth to date.  --  06-23 @ 12:38  .Sputum Sputum trap  Citrobacter freundii  Klebsiella pneumoniae  Citrobacter freundii  CLAU    Numerous Citrobacter freundii  Moderate Klebsiella pneumoniae  Normal Respiratory Bria present  --  06-20 @ 20:56  .Sputum Sputum  --  --  --    Normal Respiratory Bria present  --  06-20 @ 16:31  .Blood Blood-Peripheral  Blood Culture PCR  Blood Culture PCR  PCR    No Growth Final  --  06-19 @ 00:21  .Blood Blood-Peripheral  --  --  --    No Growth Final  --      RADIOLOGY & ADDITIONAL STUDIES:  < from: Xray Chest 1 View- PORTABLE-Routine (Xray Chest 1 View- PORTABLE-Routine in AM.) (06.24.21 @ 02:22) >  IMPRESSION:  Smaller left mid to lower lung infiltrate. Mild right lower lung linear atelectasis.    < end of copied text >

## 2021-06-25 NOTE — PROGRESS NOTE ADULT - PROBLEM SELECTOR PLAN 5
- Increased speed to 9000 RPM to better unload ventricle. Would repeat TTE when extubated  - remains euvolemic on exam today  - c/w heparin but would target lower PTT goal at this time. Eventual resumption of warfarin   - Will plan to hold aspirin indefinitely given GIB  - Continue to monitor LDH daily.

## 2021-06-25 NOTE — PROGRESS NOTE ADULT - ATTENDING COMMENTS
Briefly, 66 y/o M w/ h/o stage D NICM s/p HM2 on 9/2017 as DT (due to severe PAD) with TV ring, prior COVID-19 infection 4/2020, recurrent syncope post LVAD s/p ILR, and chronic abdominal pain with prior negative workup who was admitted on 6/14 with symptomatic anemia with Hgb 4.5 in setting of INR 8.8 without hemodynamic instability. Video capsule showed active bleeding in the mid small bowel but subsequent enteroscopy 6/15 did not reveal any active bleeding. He acutely decompensated after procedure with fever/hypertension, low flow alarms, and pulmonary infiltrate with hypoxia requiring intubation from probable aspiration though TRALI/TACO also on the differential. Found to have C. diff on 9/20. LVAD speed was increased to 9000 to unload LV better. Has not been able to tolerate CPAP and has significant secretions with sputum culture with Citrobacter/Klebsiella. Underwent CT w/o evidence of toxic megacolon. Currently remains intubated and deeply sedated (d/t agitation), JVP 6-8 with HJR, LVAD hum present, distended nontender abdomen, no pedal edema. Labs reviewed - WBC 18 (uptrending), Hb 8.2 (s/p transfusion), BUN/Cr stable, . Abdom x-ray with distended transverse colon and CT showed fluid-filled colon.   - unable to be weaned from vent; plan for trach today; consider Seroquel for agitation   - maintain CVP 6-8  - MAP at goal; of note very pulsatile on A-line tracing  - c/w vanc PO and IV flagyl for C. diff  - monitor H/H; guaiac positive; may need repeat scope but will monitor for now; c/w hep gtt (goal 60-70); ASA on hold given GIB

## 2021-06-25 NOTE — PROGRESS NOTE ADULT - PROBLEM SELECTOR PLAN 1
- Keep the Trach site clean and dry.   - Remove Surgicel on POD #1.  - Remove Sutures on POD #7.   - Keep the Omniflex for a week to avoid the manipulation of the stoma.   - Elevate HOB.   - Gentle suction prn.   - Vent per RT.   - Transfusion support prn.   - c/w Cefepime.   - ENT will follow.   - Call with questions.     # [phone]  ENT PA.

## 2021-06-25 NOTE — PROGRESS NOTE ADULT - SUBJECTIVE AND OBJECTIVE BOX
CRITICAL CARE ATTENDING - CTICU    MEDICATIONS  (STANDING):  aMIOdarone    Tablet 200 milliGRAM(s) Oral daily  chlorhexidine 0.12% Liquid 15 milliLiter(s) Oral Mucosa every 12 hours  chlorhexidine 2% Cloths 1 Application(s) Topical <User Schedule>  clonazePAM  Tablet 1 milliGRAM(s) Oral every 8 hours  dexMEDEtomidine Infusion 0.5 MICROgram(s)/kG/Hr (9.81 mL/Hr) IV Continuous <Continuous>  heparin  Infusion 400 Unit(s)/Hr (11.5 mL/Hr) IV Continuous <Continuous>  insulin lispro (ADMELOG) corrective regimen sliding scale   SubCutaneous every 6 hours  ketamine Infusion. 0.501 mG/kG/Hr (3.93 mL/Hr) IV Continuous <Continuous>  metoclopramide Injectable 10 milliGRAM(s) IV Push every 8 hours  metroNIDAZOLE  IVPB      metroNIDAZOLE  IVPB 500 milliGRAM(s) IV Intermittent every 6 hours  pantoprazole  Injectable 40 milliGRAM(s) IV Push every 12 hours  sodium chloride 0.9%. 1000 milliLiter(s) (10 mL/Hr) IV Continuous <Continuous>  vancomycin    Solution 500 milliGRAM(s) Oral every 6 hours                                    8.1    18.07 )-----------( 316      ( 2021 00:43 )             26.2       06-25    140  |  109<H>  |  14  ----------------------------<  153<H>  3.9   |  17<L>  |  0.85    Ca    8.8      2021 00:43  Phos  2.6     06-  Mg     1.8     06    TPro  7.2  /  Alb  3.4  /  TBili  0.4  /  DBili  x   /  AST  76<H>  /  ALT  85<H>  /  AlkPhos  319<H>  06      PTT - ( 2021 14:32 )  PTT:42.1 sec    Mode: AC/ CMV (Assist Control/ Continuous Mandatory Ventilation)  RR (machine): 20  TV (machine): 500  FiO2: 50  PEEP: 5  ITime: 1  MAP: 10  PIP: 28      Daily     Daily Weight in k.7 (2021 01:00)      06-23 @ 07:01  -  - @ 07:00  --------------------------------------------------------  IN: 2870.5 mL / OUT: 3755 mL / NET: -884.5 mL     @ 07:01   @ 06:54  --------------------------------------------------------  IN: 3004.4 mL / OUT: 1930 mL / NET: 1074.4 mL          Critically Ill patient  : [ ] preoperative ,   [ ] post operative  [x] Non operative     Requires :  [x] Arterial Line   [x] Central Line  [ ] PA catheter  [ ] IABP  [ ] ECMO  [x] LVAD  [x] Ventilator  [ ] pacemaker [ ] Impella.                      [x] ABG's     [x] Pulse Oxymetry Monitoring  Bedside evaluation , monitoring , treatment of hemodynamics , fluids , IVP/ IVCD meds.        Diagnosis:     Admitted to CTU on  for melena with anemia     LVAD patient     Ventilator Management:  [x]AC-rest    [x]CPAP-PS Wean    [ ]Trach Collar     [ ]Extubate    [ ] T-Piece  [ ]peep>5     IVCD Precedex and IVCD ketamine for vent synchrony / agitation / delirium     Hypovolemia     Respiratory Failure - ARDS - resolving    Difficult weaning process - multiple organ system involvement in critically ill patient     Melena     Sepsis - L Pneumonia/ pneumonitis - resolving      IVCD anticoagulation with [x ] Heparin  [ ] Argatroban for VAD circuit     Requires chest PT, pulmonary toilet, ambu bagging, suctioning to maintain SaO2,  patent airway and treat atelectasis.    Requires bedside physical therapy, mobilization and total halfway care.    NPO for trach today           By signing my name below, I, Agnieszka Cherry, attest that this documentation has been prepared under the direction and in the presence of Luis Fernando Thompson MD.   Electronically Signed: Cesia Shaffer 21 @ 06:54      Discussed with CT surgeon, Physician Assistant - Nurse Practitioner- Critical care medicine team.   Discussed at  AM / PM rounds.   Chart, labs , films reviewed.    Cumulative Critical Care Time Given Today:  CRITICAL CARE ATTENDING - CTICU    MEDICATIONS  (STANDING):  aMIOdarone    Tablet 200 milliGRAM(s) Oral daily  chlorhexidine 0.12% Liquid 15 milliLiter(s) Oral Mucosa every 12 hours  chlorhexidine 2% Cloths 1 Application(s) Topical <User Schedule>  clonazePAM  Tablet 1 milliGRAM(s) Oral every 8 hours  dexMEDEtomidine Infusion 0.5 MICROgram(s)/kG/Hr (9.81 mL/Hr) IV Continuous <Continuous>  heparin  Infusion 400 Unit(s)/Hr (11.5 mL/Hr) IV Continuous <Continuous>  insulin lispro (ADMELOG) corrective regimen sliding scale   SubCutaneous every 6 hours  ketamine Infusion. 0.501 mG/kG/Hr (3.93 mL/Hr) IV Continuous <Continuous>  metoclopramide Injectable 10 milliGRAM(s) IV Push every 8 hours  metroNIDAZOLE  IVPB      metroNIDAZOLE  IVPB 500 milliGRAM(s) IV Intermittent every 6 hours  pantoprazole  Injectable 40 milliGRAM(s) IV Push every 12 hours  sodium chloride 0.9%. 1000 milliLiter(s) (10 mL/Hr) IV Continuous <Continuous>  vancomycin    Solution 500 milliGRAM(s) Oral every 6 hours                                    8.1    18.07 )-----------( 316      ( 2021 00:43 )             26.2       06-25    140  |  109<H>  |  14  ----------------------------<  153<H>  3.9   |  17<L>  |  0.85    Ca    8.8      2021 00:43  Phos  2.6     06-  Mg     1.8     06    TPro  7.2  /  Alb  3.4  /  TBili  0.4  /  DBili  x   /  AST  76<H>  /  ALT  85<H>  /  AlkPhos  319<H>  06      PTT - ( 2021 14:32 )  PTT:42.1 sec    Mode: AC/ CMV (Assist Control/ Continuous Mandatory Ventilation)  RR (machine): 20  TV (machine): 500  FiO2: 50  PEEP: 5  ITime: 1  MAP: 10  PIP: 28      Daily     Daily Weight in k.7 (2021 01:00)      06-23 @ 07:01  -  - @ 07:00  --------------------------------------------------------  IN: 2870.5 mL / OUT: 3755 mL / NET: -884.5 mL     @ 07:01   @ 06:54  --------------------------------------------------------  IN: 3004.4 mL / OUT: 1930 mL / NET: 1074.4 mL          Critically Ill patient  : [ ] preoperative ,   [ ] post operative  [x] Non operative     Requires :  [x] Arterial Line   [x] Central Line  [ ] PA catheter  [ ] IABP  [ ] ECMO  [x] LVAD  [x] Ventilator  [ ] pacemaker [ ] Impella.                      [x] ABG's     [x] Pulse Oxymetry Monitoring  Bedside evaluation , monitoring , treatment of hemodynamics , fluids , IVP/ IVCD meds.        Diagnosis:     Admitted to CTU on  for melena with anemia     LVAD patient     Ventilator Management:  [x]AC-rest    [x]CPAP-PS Wean    [ ]Trach Collar     [ ]Extubate    [ ] T-Piece  [ ]peep>5     IVCD Precedex and IVCD ketamine for vent synchrony / agitation / delirium     Weaning  Ketamine    Hypovolemia     Respiratory Failure - ARDS - resolving    Difficult weaning process - multiple organ system involvement in critically ill patient     Melena     Sepsis - L Pneumonia/ pneumonitis - resolving      IVCD anticoagulation with [x ] Heparin  [ ] Argatroban for VAD circuit     Requires chest PT, pulmonary toilet, ambu bagging, suctioning to maintain SaO2,  patent airway and treat atelectasis.    Requires bedside physical therapy, mobilization and total skilled nursing care.    NPO for Tracheostomy  today           By signing my name below, Agnieszka STANLEY, attest that this documentation has been prepared under the direction and in the presence of Luis Fernando Thompson MD.   Electronically Signed: Cesia Shaffer 21 @ 06:54    Luis Fernando STANLEY, personally performed the services described in this documentation. All medical record entries made by the scribe were at my direction and in my presence. I have reviewed the chart and agree that the record reflects my personal performance and is accurate and complete.   Luis Fernando Thompson MD.       Discussed with CT surgeon, Physician Assistant - Nurse Practitioner- Critical care medicine team.   Discussed at  AM / PM rounds.   Chart, labs , films reviewed.    Cumulative Critical Care Time Given Today:  30 min

## 2021-06-25 NOTE — PROGRESS NOTE ADULT - PROBLEM SELECTOR PLAN 1
- wean ventilator as tolerates  - sedation vacation and wean as per CTU  - agree w/ nighttime Seroquel to help w/ agitation  - would repeat ECG now to re-eval QTc  - c/w tx VAP; agree w/ ID input for abxs guidance  - appr ENT recs, plan for trach today

## 2021-06-26 NOTE — PROGRESS NOTE ADULT - ATTENDING COMMENTS
Briefly, 64 y/o M w/ h/o stage D NICM s/p HM2 on 9/2017 as DT (due to severe PAD) with TV ring, prior COVID-19 infection 4/2020, recurrent syncope post LVAD s/p ILR, and chronic abdominal pain with prior negative workup who was admitted on 6/14 with symptomatic anemia with Hgb 4.5 in setting of INR 8.8 without hemodynamic instability. Video capsule showed active bleeding in the mid small bowel but subsequent enteroscopy 6/15 did not reveal any active bleeding. He acutely decompensated after procedure with fever/hypertension, low flow alarms, and pulmonary infiltrate with hypoxia requiring intubation from probable aspiration though TRALI/TACO also on the differential. Found to have C. diff on 9/20. LVAD speed was increased to 9000 to unload LV better. Has not been able to tolerate CPAP and has significant secretions with sputum culture with Citrobacter/Klebsiella. Underwent CT w/o evidence of toxic megacolon. Currently remains intubated and deeply sedated (d/t agitation), underwent trach 6/25. On exam JVP 2-4 with HJR, LVAD hum present, distended nontender abdomen, no pedal edema. Labs reviewed - WBC 15 (downtrending), Hb 8.2 (stable), BUN/Cr stable, . Abdom x-ray with distended transverse colon and CT showed fluid-filled colon.   - unable to be weaned from vent; s/p trach; consider Seroquel for agitation if needed  - maintain CVP 6-8; give albumin   - MAP at goal; of note very pulsatile on A-line tracing  - c/w vanc PO and IV flagyl for C. diff  - started on cefepime for Citrobacter/Klebsiella  - monitor H/H; guaiac positive; may need repeat scope but will monitor for now; c/w hep gtt (goal 60-70); ASA on hold given GIB

## 2021-06-26 NOTE — PROGRESS NOTE ADULT - SUBJECTIVE AND OBJECTIVE BOX
ENT ISSUE/POD:  S/P #8 DCT Tracheostomy POD # 1.      HPI: 64 YO M with significant PMHx, admitted for weakness and fatigue, was found to have CAD S/P CABG on 6/18. ENT called to evaluate for tracheostomy. Pt failed weaning trials and is not a candidate for extubation as per primary team. Now S/P # 8 DCT Tracheostomy POD # 1. Surgicel x1  placed around the stoma in OR.  Doing well on the ventilator. No bleeding noted.  PEEP 5, FiO2 40%.           PAST MEDICAL & SURGICAL HISTORY:  CHF (congestive heart failure)    CAD (coronary artery disease)    Depression    Pleural effusion    History of 2019 novel coronavirus disease (COVID-19)  april 2020    Hemorrhoids    Bleeding hemorrhoids    Peripheral arterial disease    Claudication    BPH with urinary obstruction    ACC/AHA stage D systolic heart failure    Anticoagulation goal of INR 2.0 to 2.5    Falls    Clavicle fracture    CKD (chronic kidney disease), stage III    Iron deficiency anemia    H/O epistaxis    Vertigo    GI bleed    S/P TVR (tricuspid valve repair)    S/P ventricular assist device    S/P endoscopy      Allergies    No Known Allergies    Intolerances      MEDICATIONS  (STANDING):  aMIOdarone    Tablet 200 milliGRAM(s) Oral daily  cefepime   IVPB 1000 milliGRAM(s) IV Intermittent every 8 hours  chlorhexidine 0.12% Liquid 15 milliLiter(s) Oral Mucosa every 12 hours  chlorhexidine 2% Cloths 1 Application(s) Topical <User Schedule>  clonazePAM  Tablet 1 milliGRAM(s) Oral every 8 hours  dexMEDEtomidine Infusion 0.5 MICROgram(s)/kG/Hr (9.81 mL/Hr) IV Continuous <Continuous>  heparin  Infusion 400 Unit(s)/Hr (12 mL/Hr) IV Continuous <Continuous>  insulin lispro (ADMELOG) corrective regimen sliding scale   SubCutaneous every 6 hours  metoclopramide Injectable 10 milliGRAM(s) IV Push every 8 hours  metroNIDAZOLE  IVPB 500 milliGRAM(s) IV Intermittent every 8 hours  pantoprazole  Injectable 40 milliGRAM(s) IV Push every 12 hours  propofol Infusion 20 MICROgram(s)/kG/Min (9.42 mL/Hr) IV Continuous <Continuous>  sodium chloride 0.9%. 1000 milliLiter(s) (10 mL/Hr) IV Continuous <Continuous>  vancomycin    Solution 500 milliGRAM(s) Oral every 6 hours    MEDICATIONS  (PRN):  acetaminophen    Suspension .. 650 milliGRAM(s) Oral every 6 hours PRN Temp greater or equal to 38C (100.4F)      Social History: see consult    Family history: see consult    ROS:   ENT: all negative except as noted in HPI   Pulm: denies SOB, cough, hemoptysis  Neuro: denies numbness/tingling, loss of sensation  Endo: denies heat/cold intolerance, excessive sweating      Vital Signs Last 24 Hrs  T(C): 37.8 (26 Jun 2021 16:00), Max: 37.9 (26 Jun 2021 08:00)  T(F): 100 (26 Jun 2021 16:00), Max: 100.2 (26 Jun 2021 08:00)  HR: 64 (26 Jun 2021 16:05) (55 - 75)  BP: --  BP(mean): --  RR: 19 (26 Jun 2021 16:00) (13 - 43)  SpO2: 100% (26 Jun 2021 16:05) (95% - 100%)                          8.0    15.36 )-----------( 299      ( 26 Jun 2021 00:36 )             26.2    06-26    136  |  106  |  11  ----------------------------<  145<H>  4.2   |  19<L>  |  0.73    Ca    8.9      26 Jun 2021 00:36  Phos  2.7     06-26  Mg     2.0     06-26    TPro  7.1  /  Alb  3.2<L>  /  TBili  0.4  /  DBili  x   /  AST  51<H>  /  ALT  65<H>  /  AlkPhos  270<H>  06-26   PT/INR - ( 26 Jun 2021 09:45 )   PT: 15.6 sec;   INR: 1.32 ratio         PTT - ( 26 Jun 2021 09:45 )  PTT:47.0 sec    PHYSICAL EXAM:  Gen: NAD, On Vent.   Skin: No rashes, bruises, or lesions.  Head: Normocephalic, Atraumatic.  Face: no edema, erythema, or fluctuance. Parotid glands soft without mass.  Eyes: no scleral injection.  Nose: Nares bilaterally patent, no discharge  Mouth: No Stridor / Drooling / Trismus.  Mucosa moist, tongue/uvula midline, oropharynx clear  Neck: # 8 DCT Trach secured with sutures x 4 and Umbilical Tie. Surgicel x1 around the stoma removed.  Minimal oozing of serosanguinous secretions,.  No bleeding noted. No hematoma/crepitus. Flat, supple, no lymphadenopathy, trachea midline, no masses.  Lymphatic: No lymphadenopathy  Resp: On Vent.   Neuro: Unable to obtain.

## 2021-06-26 NOTE — PROGRESS NOTE ADULT - PROBLEM SELECTOR PLAN 1
- Keep the Trach site clean and dry.   - Remove Sutures on POD #7.   - Keep the Omniflex for a week to avoid the manipulation of the stoma.   - Elevate HOB.

## 2021-06-26 NOTE — PROGRESS NOTE ADULT - PROBLEM SELECTOR PLAN 2
- Increased speed to 9000 RPM to better unload ventricle. Would repeat TTE when extubated  - remains euvolemic/dry on exam today. Recommend giving alblumin  - c/w heparin with target of lower PTT goal at this time. Eventual resumption of warfarin   - Will plan to hold aspirin indefinitely given GIB  - Continue to monitor LDH daily.

## 2021-06-26 NOTE — PROGRESS NOTE ADULT - ASSESSMENT
6 YO M with a history of stage D NICM s/p HM2 on 9/2017 as DT (due to severe PAD) with TV ring, prior COVID-19 infection 4/2020, recurrent syncope post LVAD s/p ILR, and chronic abdominal pain with prior negative workup who was admitted with symptomatic anemia with Hgb 4.5 in setting of INR 8.8 without hemodynamic instability. He was transfused and underwent VCE which showed active bleeding in the mid small bowel but subsequent enteroscopy 6/15 did not reveal any active bleeding. He acutely decompensated after procedure with fever/hypertension, low flow alarms, and pulmonary infiltrate with hypoxia requiring intubation from probable aspiration PNA. His LDH is normal and there are no signs of overt LVAD dysfunction on echo though signs of insufficiently unloaded ventricle for which we increased speed on 6/19.    He remains on ventilator although CXR is improving. He also has acute Cdiff colitis and is currently being tx w/ vanco PO and metronidazole. There is concern for worsening VAP. He has been afebrile over the past 24 hours on broadened abx coverage with addition of cefepime. His leukocytosis is improving. He is hemodynamically stable although with low CVP of 2 today and low albumin. His Hgb is stable.  His LDH is stable and he is without s/s of LVAD pump malfunction.

## 2021-06-26 NOTE — PROGRESS NOTE ADULT - ASSESSMENT
imp/rx:  Possible aspiration pneumonitis at time of endoscopic procedure  Completed a course of therapy with Zosyn      Recent course complicated by C.diff diarrhea    Abdomen somewhat distended- but soft  Agree with Vanco oral 500mg qid  Continue IV flagyl 500mg q 8hr    Leukocytosis and CXR atelectasis vs pneumonia  sputum with Citrobacter and Klebsiella  started on Cefepime on 6/25  will plan to treat for VAP x 5 days    Morris Prater MD  [phone]  After 5pm/weekends [phone]

## 2021-06-26 NOTE — PROGRESS NOTE ADULT - ASSESSMENT
66 YO M with significant PMHx S/P CABG complicated by respiratory failure S/P Now S/P # 8 DCT Tracheostomy POD #1. Surgicel removed

## 2021-06-26 NOTE — PROGRESS NOTE ADULT - SUBJECTIVE AND OBJECTIVE BOX
RICKY JOINT  MRN#: [id number]  Subjective:  Pulmonary progress  : recurrent Acute hypoxic respiratory Failure ,aspiration pneumonia, NICM  , chart reviewed and H/O obtained radiological and Laboratory study reviewed patient Examined     65M PMH ACC/AHA stage D HF due to NICM HM2 LVAD , TV annuloplasty ring 17 as destination therapy due to severe peripheral artery disease with significant stenosis  SIADH, Depression, CKD-3 with hyperkalemia, past E. coli UTIs, driveline drainage (21) and COVID-19 (back in 2020)  He was recently seen in clinic where he complained of abdominal pain and dark stools w constipation back in May. He presents to [facility] ER today weakness and fatigue, moderate and + Black stools for three days, on coumadin secondary to warfarin use in the setting of an LVAD. Patient has required transfusions for GIB in the past. Mostly recently back in 2021 pt had anemia with dark stools. No interventions was done at that time. However Last Endoscopy was done in 2020 (negative). Today labs show patient is anemic with H/H of 4.5/16.3,. INR is 8.84 MAP in the 90s, Temp 35.1. He denies any chest pain, shortness of breath, dizziness, abd pain, nausea or vomiting. found to have  rectal bleeding underwent endoscopy ,old blood in the proximal ileum ,  develop sepsis with LL opacity given Antibiotics , Extubated , reintubated , Bronchoscopy on Zosyn for LL pneumonia  and Amiodarone S/P TV Annuloplasty , patient remain intubated on full ventilatory support .S/P multiple units of blood transfusion , remain on full ventilatory support on Precedex and propofol , new central IJ line , diarrhea C diff. +ve on po vancomycin and IV Flagyl,  mildly distended belly , fever start on cefepime 2gm q 8 hrs S/P tracheostomy .  new RT Subclavian central line          (2021 16:57)    PAST MEDICAL & SURGICAL HISTORY:  CHF (congestive heart failure)    CAD (coronary artery disease)  Depression    Pleural effusion    History of 2019 novel coronavirus disease (COVID-19)  2020    Hemorrhoids    Bleeding hemorrhoids    Peripheral arterial disease    Claudication    BPH with urinary obstruction    ACC/AHA stage D systolic heart failure  Anticoagulation goal of INR 2.0 to 2.5    Falls    Clavicle fracture    CKD (chronic kidney disease), stage III    Iron deficiency anemia    H/O epistaxis    Vertigo    GI bleed    S/P TVR (tricuspid valve repair)    S/P ventricular assist device    S/P endoscopy    OBJECTIVE:  ICU Vital Signs Last 24 Hrs  T(C): 38.2 (2021 10:00), Max: 38.5 (2021 12:00)  T(F): 100.8 (2021 10:00), Max: 101.3 (2021 12:00)  HR: 65 (2021 10:00) (61 - 69)  BP: --  BP(mean): --  ABP: 105/67 (2021 10:00) (90/54 - 113/64)  ABP(mean): 77 (2021 10:00) (63 - 77)  RR: 20 (2021 10:00) (19 - 35)  SpO2: 99% (2021 10:00) (96% - 100%)       @ 07: @ 07:00  --------------------------------------------------------  IN: 2693.9 mL / OUT: 1415 mL / NET: 1278.9 mL     @ 07: @ 10:49  --------------------------------------------------------  IN: 420.8 mL / OUT: 115 mL / NET: 305.8 mL  PHYSICAL EXAM:Daily   Elderly male S/P tracheostomy  sedated  on full ventilatory support /40%20/5cm PEEP  0n vasopressin , sedated on propofol   Daily Weight in k.4 (2021 00:00)  HEENT:     + NCAT  + EOMI  - Conjuctival edema   - Icterus   - Thrush   - ETT  + NGT/OGT  Neck:         + FROM   RT SC line  JVD     - Nodes     - Masses    + Mid-line trachea + Tracheostomy  Chest: normal findings  Lungs:          + CTA   + Rhonchi    - Rales    - Wheezing     - Decreased BS   - Dullness R L  Cardiac:       + S1 + S2    + RRR   - Irregular   - S3  - S4    - Murmurs   - Rub   - Hamman’s sign   Abdomen:    + BS     + Soft    + Non-tender     - Distended    - Organomegaly  - PEG  Extremities:   - Cyanosis U/L   - Clubbing  U/L  + LE/UE Edema   + Capillary refill    + Pulses   Neuro:        - Awake   -  Alert   - Confused   - Lethargic   + Sedated  + Generalized Weakness  Skin:        - Rashes    - Erythema   + Normal incisions   + IV sites intact          HOSPITAL MEDICATIONS: All mediciations reviewed and analyzed  MEDICATIONS  (STANDING):  aMIOdarone    Tablet 200 milliGRAM(s) Oral daily  chlorhexidine 0.12% Liquid 15 milliLiter(s) Oral Mucosa every 12 hours  chlorhexidine 2% Cloths 1 Application(s) Topical <User Schedule>  dexMEDEtomidine Infusion 0.5 MICROgram(s)/kG/Hr (9.81 mL/Hr) IV Continuous <Continuous>  dextrose 50% Injectable 50 milliLiter(s) IV Push every 15 minutes  heparin  Infusion 400 Unit(s)/Hr (12.5 mL/Hr) IV Continuous <Continuous>  HYDROmorphone  Injectable 0.5 milliGRAM(s) IV Push once  insulin lispro (ADMELOG) corrective regimen sliding scale   SubCutaneous every 6 hours  pantoprazole  Injectable 40 milliGRAM(s) IV Push every 12 hours  piperacillin/tazobactam IVPB.. 3.375 Gram(s) IV Intermittent every 8 hours  propofol Infusion 20 MICROgram(s)/kG/Min (9.42 mL/Hr) IV Continuous <Continuous>  sodium chloride 0.9% lock flush 3 milliLiter(s) IV Push every 8 hours  sodium chloride 0.9%. 1000 milliLiter(s) (10 mL/Hr) IV Continuous <Continuous>    MEDICATIONS  (PRN):  acetaminophen    Suspension .. 650 milliGRAM(s) Oral every 6 hours PRN Temp greater or equal to 38C (100.4F)    LABS: All Lab data reviewed and analyzed                        8.0    1536 )-----------( 299      ( 2021 00:36 )             26.2               06-    136  |  106  |  11  ----------------------------<  145<H>  4.2   |  19<L>  |  0.73    Ca    8.9      2021 00:36  Phos  2.7     -  Mg     2.0         TPro  7.1  /  Alb  3.2<L>  /  TBili  0.4  /  DBili  x   /  AST  51<H>  /  ALT  65<H>  /  AlkPhos  270<H>        Ca    8.8      2021 00:43  Phos  2.6     -  Mg     1.8         TPro  7.2  /  Alb  3.4  /  TBili  0.4  /  DBili  x   /  AST  76<H>  /  ALT  85<H>  /  AlkPhos  319<H>      Ca    9.0      2021 00:11  Phos  3.0     -24  Mg     1.8         TPro  6.9  /  Alb  2.9<L>  /  TBili  0.5  /  DBili  x   /  AST  108<H>  /  ALT  97<H>  /  AlkPhos  294<H>        Ca    9.2      2021 01:00  Phos  2.2     -  Mg     2.1         TPro  6.9  /  Alb  3.3  /  TBili  0.8  /  DBili  x   /  AST  142<H>  /  ALT  87<H>  /  AlkPhos  244<H>               PTT - ( 2021 04:52 )  PTT:45.2 sec LIVER FUNCTIONS - ( 2021 00:42 )  Alb: 3.4 g/dL / Pro: 6.7 g/dL / ALK PHOS: 213 U/L / ALT: 15 U/L / AST: 24 U/L / GGT: x           RADIOLOGY: - Reviewed and analyzed LLL  pneumonia , LVAD HM2, CT scan of abdomen reviewed result noted

## 2021-06-26 NOTE — PROGRESS NOTE ADULT - PROBLEM SELECTOR PLAN 5
- continue w/ abxs for VAP tx per ID  - f/u sputum cx sensitivities  - c/w tx acute Cdiff, as above.

## 2021-06-26 NOTE — PROGRESS NOTE ADULT - SUBJECTIVE AND OBJECTIVE BOX
Subjective:  - R SC CVC placed overnight. Bleeding at site.  - nabil placed as well  - Weaned off ketamine, dex decreased. CPAP this morning    Medications:  acetaminophen    Suspension .. 650 milliGRAM(s) Oral every 6 hours PRN  aMIOdarone    Tablet 200 milliGRAM(s) Oral daily  cefepime   IVPB 1000 milliGRAM(s) IV Intermittent every 8 hours  chlorhexidine 0.12% Liquid 15 milliLiter(s) Oral Mucosa every 12 hours  chlorhexidine 2% Cloths 1 Application(s) Topical <User Schedule>  clonazePAM  Tablet 1 milliGRAM(s) Oral every 8 hours  dexMEDEtomidine Infusion 0.5 MICROgram(s)/kG/Hr IV Continuous <Continuous>  heparin  Infusion 400 Unit(s)/Hr IV Continuous <Continuous>  insulin lispro (ADMELOG) corrective regimen sliding scale   SubCutaneous every 6 hours  metoclopramide Injectable 10 milliGRAM(s) IV Push every 8 hours  metroNIDAZOLE  IVPB 500 milliGRAM(s) IV Intermittent every 8 hours  pantoprazole  Injectable 40 milliGRAM(s) IV Push every 12 hours  propofol Infusion 20 MICROgram(s)/kG/Min IV Continuous <Continuous>  sodium chloride 0.9%. 1000 milliLiter(s) IV Continuous <Continuous>  vancomycin    Solution 500 milliGRAM(s) Oral every 6 hours      Physical Exam:    Vitals:  Vital Signs Last 24 Hours  T(C): 37.9 (21 @ 12:00), Max: 37.9 (21 @ 08:00)  HR: 59 (21 @ 11:00) (55 - 64)  BP: MAP 66-73  RR: 22 (21 @ 11:00) (13 - 35)  SpO2: 100% (21 @ 11:00) (100% - 100%)    LVAD Interrogation:   Heart Mate 2  Speed 9000  Flow 4.4  Power 6  PI 6  No events. No changes made    Weight in k ( @ 00:00)    I&O's Summary    2021 07:01  -  2021 07:00  --------------------------------------------------------  IN: 2172.6 mL / OUT: 2890 mL / NET: -717.4 mL    2021 07:01  -  2021 11:57  --------------------------------------------------------  IN: 288.2 mL / OUT: 255 mL / NET: 33.2 mL    Tele: SR    General: No distress. Comfortable.  HEENT: EOM intact. NGT in place with TF running  Neck: Neck supple. JVP < 6cm H2O. No masses  Chest: Clear to auscultation bilaterally  CV: LVAD hum. No LE edema  Abdomen: Soft, non-distended, non-tender  Skin: Warm peripherally. Saturated RCW CVC dressing  Neurology: Alert. Sensation intact  Psych: MARELY, on vent    Labs:                        8.0    15.36 )-----------( 299      ( 2021 00:36 )             26.2         136  |  106  |  11  ----------------------------<  145<H>  4.2   |  19<L>  |  0.73    Ca    8.9      2021 00:36  Phos  2.7       Mg     2.0         TPro  7.1  /  Alb  3.2<L>  /  TBili  0.4  /  DBili  x   /  AST  51<H>  /  ALT  65<H>  /  AlkPhos  270<H>      PT/INR - ( 2021 09:45 )   PT: 15.6 sec;   INR: 1.32 ratio      PTT - ( 2021 09:45 )  PTT:47.0 sec    Lactate Dehydrogenase, Serum: 347 U/L ( @ 00:36)  Lactate Dehydrogenase, Serum: 394 U/L ( @ 00:43)  Lactate Dehydrogenase, Serum: 402 U/L ( @ 08:37)    6

## 2021-06-26 NOTE — PROGRESS NOTE ADULT - PROBLEM SELECTOR PLAN 3
- s/p trach 6/25; wean ventilator as tolerates  - sedation vacation and wean as per CTU  - c/w tx VAP; agree w/ ID input for abxs guidance

## 2021-06-26 NOTE — PROGRESS NOTE ADULT - SUBJECTIVE AND OBJECTIVE BOX
CRITICAL CARE ATTENDING - CTICU    MEDICATIONS  (STANDING):  aMIOdarone    Tablet 200 milliGRAM(s) Oral daily  cefepime   IVPB 1000 milliGRAM(s) IV Intermittent every 8 hours  chlorhexidine 0.12% Liquid 15 milliLiter(s) Oral Mucosa every 12 hours  chlorhexidine 2% Cloths 1 Application(s) Topical <User Schedule>  clonazePAM  Tablet 1 milliGRAM(s) Oral every 8 hours  dexMEDEtomidine Infusion 0.5 MICROgram(s)/kG/Hr (9.81 mL/Hr) IV Continuous <Continuous>  heparin  Infusion 400 Unit(s)/Hr (12 mL/Hr) IV Continuous <Continuous>  insulin lispro (ADMELOG) corrective regimen sliding scale   SubCutaneous every 6 hours  metoclopramide Injectable 10 milliGRAM(s) IV Push every 8 hours  metroNIDAZOLE  IVPB 500 milliGRAM(s) IV Intermittent every 8 hours  pantoprazole  Injectable 40 milliGRAM(s) IV Push every 12 hours  propofol Infusion 20 MICROgram(s)/kG/Min (9.42 mL/Hr) IV Continuous <Continuous>  sodium chloride 0.9%. 1000 milliLiter(s) (10 mL/Hr) IV Continuous <Continuous>  vancomycin    Solution 500 milliGRAM(s) Oral every 6 hours                                    8.0    15.36 )-----------( 299      ( 2021 00:36 )             26.2           136  |  106  |  11  ----------------------------<  145<H>  4.2   |  19<L>  |  0.73    Ca    8.9      2021 00:36  Phos  2.7       Mg     2.0         TPro  7.1  /  Alb  3.2<L>  /  TBili  0.4  /  DBili  x   /  AST  51<H>  /  ALT  65<H>  /  AlkPhos  270<H>        PT/INR - ( 2021 00:36 )   PT: 15.1 sec;   INR: 1.27 ratio         PTT - ( 2021 00:36 )  PTT:47.3 sec    Mode: AC/ CMV (Assist Control/ Continuous Mandatory Ventilation)  RR (machine): 12  TV (machine): 450  FiO2: 40  PEEP: 5  ITime: 1  MAP: 10  PIP: 28      Daily     Daily Weight in k (2021 00:00)       @ 07:  -   @ 07:00  --------------------------------------------------------  IN: 3053.2 mL / OUT: 2080 mL / NET: 973.2 mL     @ 07: @ 06:57  --------------------------------------------------------  IN: 2172.6 mL / OUT: 2890 mL / NET: -717.4 mL        Critically Ill patient  : [ ] preoperative ,   [ ] post operative [x] Non Operative     Requires :  [x] Arterial Line   [x] Central Line  [ ] PA catheter  [ ] IABP  [ ] ECMO  [x] LVAD  [x] Ventilator  [ ] pacemaker [ ] Impella.                      [x] ABG's     [x] Pulse Oxymetry Monitoring  Bedside evaluation , monitoring , treatment of hemodynamics , fluids , IVP/ IVCD meds.        Diagnosis:     Admitted to CTU on  for melena with anemia     POD 1 - Tracheostomy     LVAD patient     Ventilator Management:  [x]AC-rest    [x]CPAP-PS Wean    [ ]Trach Collar     [ ]Extubate    [ ] T-Piece  [ ]peep>5     IVCD Precedex  for vent synchrony / agitation / delirium     Hypovolemia    Respiratory Failure - ARDS - resolving    Difficult weaning process - multiple organ system involvement in critically ill patient    Melena     Sepsis - L Pneumonia/ pneumonitis - resolving      IVCD anticoagulation with [x ] Heparin  [ ] Argatroban for LVAD circuit     Requires chest PT, pulmonary toilet, ambu bagging, suctioning to maintain SaO2,  patent airway and treat atelectasis.     Requires bedside physical therapy, mobilization and total residential care.                   By signing my name below, I, Rojas Junior, attest that this documentation has been prepared under the direction and in the presence of Luis Fernando Thompson MD.   Electronically Signed: Cesia Cohen 21 @ 06:57      Discussed with CT surgeon, Physician Assistant - Nurse Practitioner- Critical care medicine team.   Dicussed at  AM / PM rounds.   Chart, labs , films reviewed.    Cumulative Critical Care Time Given Today:  CRITICAL CARE ATTENDING - CTICU    MEDICATIONS  (STANDING):  aMIOdarone    Tablet 200 milliGRAM(s) Oral daily  cefepime   IVPB 1000 milliGRAM(s) IV Intermittent every 8 hours  chlorhexidine 0.12% Liquid 15 milliLiter(s) Oral Mucosa every 12 hours  chlorhexidine 2% Cloths 1 Application(s) Topical <User Schedule>  clonazePAM  Tablet 1 milliGRAM(s) Oral every 8 hours  dexMEDEtomidine Infusion 0.5 MICROgram(s)/kG/Hr (9.81 mL/Hr) IV Continuous <Continuous>  heparin  Infusion 400 Unit(s)/Hr (12 mL/Hr) IV Continuous <Continuous>  insulin lispro (ADMELOG) corrective regimen sliding scale   SubCutaneous every 6 hours  metoclopramide Injectable 10 milliGRAM(s) IV Push every 8 hours  metroNIDAZOLE  IVPB 500 milliGRAM(s) IV Intermittent every 8 hours  pantoprazole  Injectable 40 milliGRAM(s) IV Push every 12 hours  propofol Infusion 20 MICROgram(s)/kG/Min (9.42 mL/Hr) IV Continuous <Continuous>  sodium chloride 0.9%. 1000 milliLiter(s) (10 mL/Hr) IV Continuous <Continuous>  vancomycin    Solution 500 milliGRAM(s) Oral every 6 hours                                    8.0    15.36 )-----------( 299      ( 2021 00:36 )             26.2           136  |  106  |  11  ----------------------------<  145<H>  4.2   |  19<L>  |  0.73    Ca    8.9      2021 00:36  Phos  2.7       Mg     2.0         TPro  7.1  /  Alb  3.2<L>  /  TBili  0.4  /  DBili  x   /  AST  51<H>  /  ALT  65<H>  /  AlkPhos  270<H>        PT/INR - ( 2021 00:36 )   PT: 15.1 sec;   INR: 1.27 ratio         PTT - ( 2021 00:36 )  PTT:47.3 sec    Mode: AC/ CMV (Assist Control/ Continuous Mandatory Ventilation)  RR (machine): 12  TV (machine): 450  FiO2: 40  PEEP: 5  ITime: 1  MAP: 10  PIP: 28      Daily     Daily Weight in k (2021 00:00)       @ 07:  -   @ 07:00  --------------------------------------------------------  IN: 3053.2 mL / OUT: 2080 mL / NET: 973.2 mL     @ 07:  -   @ 06:57  --------------------------------------------------------  IN: 2172.6 mL / OUT: 2890 mL / NET: -717.4 mL        Critically Ill patient  : [ ] preoperative ,   [ ] post operative [x] Non Operative     Requires :  [x] Arterial Line   [x] Central Line  [ ] PA catheter  [ ] IABP  [ ] ECMO  [x] LVAD  [x] Ventilator  [ ] pacemaker [ ] Impella.                      [x] ABG's     [x] Pulse Oxymetry Monitoring  Bedside evaluation , monitoring , treatment of hemodynamics , fluids , IVP/ IVCD meds.        Diagnosis:     Admitted to CTU on  for melena with anemia     POD 1 - Tracheostomy     LVAD patient     Ventilator Management:  [x]AC-rest    [x]CPAP-PS Wean    [ ]Trach Collar     [ ]Extubate    [ ] T-Piece  [ ]peep>5     IVCD Precedex  for vent synchrony / agitation / delirium     Hypotension a/w Hypertension, requiring intravenous medication.     Hypovolemia    Respiratory Failure - ARDS - resolving    Difficult weaning process - multiple organ system involvement in critically ill patient    Melena     Sepsis - L Pneumonia/ pneumonitis - resolving      IVCD anticoagulation with [x ] Heparin  [ ] Argatroban for LVAD circuit     Requires chest PT, pulmonary toilet, ambu bagging, suctioning to maintain SaO2,  patent airway and treat atelectasis.     Requires bedside physical therapy, mobilization and total senior living care.     Agitation / Delirium                   By signing my name below, I, Rojas Junior, attest that this documentation has been prepared under the direction and in the presence of Luis Fernando Thompson MD.   Electronically Signed: Cesia Cohen - @ 06:57    I, Luis Fernando Thompson, personally performed the services described in this documentation. All medical record entries made by the scribe were at my direction and in my presence. I have reviewed the chart and agree that the record reflects my personal performance and is accurate and complete.   Luis Fernando Thompson MD.       Discussed with CT surgeon, Physician Assistant - Nurse Practitioner- Critical care medicine team.   Dicussed at  AM / PM rounds.   Chart, labs , films reviewed.    Cumulative Critical Care Time Given Today:  30 min

## 2021-06-26 NOTE — PROGRESS NOTE ADULT - SUBJECTIVE AND OBJECTIVE BOX
INFECTIOUS DISEASES FOLLOW UP-- Anitra Prater  [phone]    This is a follow up note for this  66yMale with  LVADHM3  C.diff colitis        ROS:  CONSTITUTIONAL: sedated, non interactive    Allergies    No Known Allergies    Intolerances        ANTIBIOTICS/RELEVANT:  antimicrobials  cefepime   IVPB 1000 milliGRAM(s) IV Intermittent every 8 hours  metroNIDAZOLE  IVPB 500 milliGRAM(s) IV Intermittent every 8 hours  vancomycin    Solution 500 milliGRAM(s) Oral every 6 hours    immunologic:    OTHER:  acetaminophen    Suspension .. 650 milliGRAM(s) Oral every 6 hours PRN  aMIOdarone    Tablet 200 milliGRAM(s) Oral daily  chlorhexidine 0.12% Liquid 15 milliLiter(s) Oral Mucosa every 12 hours  chlorhexidine 2% Cloths 1 Application(s) Topical <User Schedule>  clonazePAM  Tablet 1 milliGRAM(s) Oral every 8 hours  dexMEDEtomidine Infusion 0.5 MICROgram(s)/kG/Hr IV Continuous <Continuous>  heparin  Infusion 400 Unit(s)/Hr IV Continuous <Continuous>  insulin lispro (ADMELOG) corrective regimen sliding scale   SubCutaneous every 6 hours  metoclopramide Injectable 10 milliGRAM(s) IV Push every 8 hours  pantoprazole  Injectable 40 milliGRAM(s) IV Push every 12 hours  propofol Infusion 20 MICROgram(s)/kG/Min IV Continuous <Continuous>  sodium chloride 0.9%. 1000 milliLiter(s) IV Continuous <Continuous>      Objective:  Vital Signs Last 24 Hrs  T(C): 37.8 (26 Jun 2021 16:00), Max: 37.9 (26 Jun 2021 08:00)  T(F): 100 (26 Jun 2021 16:00), Max: 100.2 (26 Jun 2021 08:00)  HR: 63 (26 Jun 2021 17:00) (56 - 75)  BP: --  BP(mean): --  RR: 17 (26 Jun 2021 17:00) (13 - 43)  SpO2: 100% (26 Jun 2021 17:00) (95% - 100%)    PHYSICAL EXAM:  Constitutional:no acute distress  Eyes:ALONSO,   Ear/Nose/Throat: no oral lesions, trach site no erythema	  Respiratory: clear BL  Cardiovascular: S1S2 VAd sounds  Gastrointestinal:soft, (+) BS,   Extremities:no e/e/c  No Lymphadenopathy  IV sites not inflammed.    LABS:                        8.0    15.36 )-----------( 299      ( 26 Jun 2021 00:36 )             26.2     06-26    136  |  106  |  11  ----------------------------<  145<H>  4.2   |  19<L>  |  0.73    Ca    8.9      26 Jun 2021 00:36  Phos  2.7     06-26  Mg     2.0     06-26    TPro  7.1  /  Alb  3.2<L>  /  TBili  0.4  /  DBili  x   /  AST  51<H>  /  ALT  65<H>  /  AlkPhos  270<H>  06-26    PT/INR - ( 26 Jun 2021 09:45 )   PT: 15.6 sec;   INR: 1.32 ratio         PTT - ( 26 Jun 2021 09:45 )  PTT:47.0 sec      MICROBIOLOGY:            RECENT CULTURES:  06-23 @ 22:49  .Blood Blood  --  --  --    No growth to date.  --  06-23 @ 12:38  .Sputum Sputum trap  Citrobacter freundii  Klebsiella pneumoniae  Citrobacter freundii  CLAU    Numerous Citrobacter freundii  Moderate Klebsiella pneumoniae  Normal Respiratory Bria present  --  06-20 @ 20:56  .Sputum Sputum  --  --  --    Normal Respiratory Bria present  --  06-20 @ 16:31  .Blood Blood-Peripheral  Blood Culture PCR  Blood Culture PCR  PCR    No Growth Final  --      RADIOLOGY & ADDITIONAL STUDIES:    < from: Xray Chest 1 View- PORTABLE-Routine (Xray Chest 1 View- PORTABLE-Routine in AM.) (06.26.21 @ 03:29) >  IMPRESSION:  Lines and tubes as above.  Right basilar linear atelectasis airspace disease left base similar to prior    < end of copied text >

## 2021-06-27 NOTE — PROGRESS NOTE ADULT - SUBJECTIVE AND OBJECTIVE BOX
CRITICAL CARE ATTENDING - CTICU    MEDICATIONS  (STANDING):  aMIOdarone    Tablet 200 milliGRAM(s) Oral daily  cefepime   IVPB 1000 milliGRAM(s) IV Intermittent every 8 hours  chlorhexidine 0.12% Liquid 15 milliLiter(s) Oral Mucosa every 12 hours  chlorhexidine 2% Cloths 1 Application(s) Topical <User Schedule>  clonazePAM  Tablet 1 milliGRAM(s) Oral every 8 hours  dexMEDEtomidine Infusion 0.5 MICROgram(s)/kG/Hr (9.81 mL/Hr) IV Continuous <Continuous>  heparin  Infusion 400 Unit(s)/Hr (12.5 mL/Hr) IV Continuous <Continuous>  insulin lispro (ADMELOG) corrective regimen sliding scale   SubCutaneous every 6 hours  metoclopramide Injectable 10 milliGRAM(s) IV Push every 8 hours  metroNIDAZOLE  IVPB 500 milliGRAM(s) IV Intermittent every 8 hours  pantoprazole  Injectable 40 milliGRAM(s) IV Push every 12 hours  sodium chloride 0.9%. 1000 milliLiter(s) (10 mL/Hr) IV Continuous <Continuous>  vancomycin    Solution 500 milliGRAM(s) Oral every 6 hours                                    7.3    14.38 )-----------( 325      ( 2021 00:53 )             24.6           135  |  104  |  11  ----------------------------<  163<H>  4.0   |  20<L>  |  0.68    Ca    8.9      2021 00:53  Phos  2.6       Mg     1.8         TPro  7.0  /  Alb  3.1<L>  /  TBili  0.3  /  DBili  x   /  AST  36  /  ALT  47<H>  /  AlkPhos  227<H>        PT/INR - ( 2021 00:53 )   PT: 15.1 sec;   INR: 1.27 ratio         PTT - ( 2021 00:53 )  PTT:45.6 sec    Mode: AC/ CMV (Assist Control/ Continuous Mandatory Ventilation)  RR (machine): 12  TV (machine): 450  FiO2: 40  PEEP: 5  ITime: 1  MAP: 10  PIP: 22      Daily     Daily Weight in k.5 (2021 00:00)      06-25 @ 07: @ 07:00  --------------------------------------------------------  IN: 2172.6 mL / OUT: 2890 mL / NET: -717.4 mL     @ 07:01   @ 06:48  --------------------------------------------------------  IN: 2022.8 mL / OUT: 1810 mL / NET: 212.8 mL            Critically Ill patient  : [ ] preoperative ,   [ ] post operative   [x] Non Operative     Requires :  [x] Arterial Line   [x] Central Line  [ ] PA catheter  [ ] IABP  [ ] ECMO  [x] LVAD  [x] Ventilator  [ ] pacemaker [ ] Impella.                      [x] ABG's     [x] Pulse Oxymetry Monitoring  Bedside evaluation , monitoring , treatment of hemodynamics , fluids , IVP/ IVCD meds.        Diagnosis:     Admitted to CTU on  for melena with anemia     POD 2 - Tracheostomy     LVAD patient     Ventilator Management:  [x]AC-rest    [x]CPAP-PS Wean    [x]Trach Collar     [ ]Extubate    [ ] T-Piece  [ ]peep>5     IVCD Precedex  for vent synchrony / agitation / delirium     Hypotension a/w Hypertension, requiring intravenous medication.     Hypovolemia    Respiratory Failure - ARDS - resolving    Difficult weaning process - multiple organ system involvement in critically ill patient    Melena     Sepsis - L Pneumonia/ pneumonitis - resolving      IVCD anticoagulation with [x ] Heparin  [ ] Argatroban for LVAD circuit     Requires chest PT, pulmonary toilet, ambu bagging, suctioning to maintain SaO2,  patent airway and treat atelectasis.     Requires bedside physical therapy, mobilization and total skilled nursing care.     Agitation / Delirium             By signing my name below, I, Agnieszka Cherry, attest that this documentation has been prepared under the direction and in the presence of Luis Fernando Thompson MD.   Electronically Signed: Cesia Shaffer 21 @ 06:48      Discussed with CT surgeon, Physician Assistant - Nurse Practitioner- Critical care medicine team.   Discussed at  AM / PM rounds.   Chart, labs , films reviewed.    Cumulative Critical Care Time Given Today:  CRITICAL CARE ATTENDING - CTICU    MEDICATIONS  (STANDING):  aMIOdarone    Tablet 200 milliGRAM(s) Oral daily  cefepime   IVPB 1000 milliGRAM(s) IV Intermittent every 8 hours  chlorhexidine 0.12% Liquid 15 milliLiter(s) Oral Mucosa every 12 hours  chlorhexidine 2% Cloths 1 Application(s) Topical <User Schedule>  clonazePAM  Tablet 1 milliGRAM(s) Oral every 8 hours  dexMEDEtomidine Infusion 0.5 MICROgram(s)/kG/Hr (9.81 mL/Hr) IV Continuous <Continuous>  heparin  Infusion 400 Unit(s)/Hr (12.5 mL/Hr) IV Continuous <Continuous>  insulin lispro (ADMELOG) corrective regimen sliding scale   SubCutaneous every 6 hours  metoclopramide Injectable 10 milliGRAM(s) IV Push every 8 hours  metroNIDAZOLE  IVPB 500 milliGRAM(s) IV Intermittent every 8 hours  pantoprazole  Injectable 40 milliGRAM(s) IV Push every 12 hours  sodium chloride 0.9%. 1000 milliLiter(s) (10 mL/Hr) IV Continuous <Continuous>  vancomycin    Solution 500 milliGRAM(s) Oral every 6 hours                                    7.3    14.38 )-----------( 325      ( 2021 00:53 )             24.6           135  |  104  |  11  ----------------------------<  163<H>  4.0   |  20<L>  |  0.68    Ca    8.9      2021 00:53  Phos  2.6       Mg     1.8         TPro  7.0  /  Alb  3.1<L>  /  TBili  0.3  /  DBili  x   /  AST  36  /  ALT  47<H>  /  AlkPhos  227<H>        PT/INR - ( 2021 00:53 )   PT: 15.1 sec;   INR: 1.27 ratio         PTT - ( 2021 00:53 )  PTT:45.6 sec    Mode: AC/ CMV (Assist Control/ Continuous Mandatory Ventilation)  RR (machine): 12  TV (machine): 450  FiO2: 40  PEEP: 5  ITime: 1  MAP: 10  PIP: 22      Daily     Daily Weight in k.5 (2021 00:00)      06-25 @ 07: @ 07:00  --------------------------------------------------------  IN: 2172.6 mL / OUT: 2890 mL / NET: -717.4 mL     @ 07:01   @ 06:48  --------------------------------------------------------  IN: 2022.8 mL / OUT: 1810 mL / NET: 212.8 mL            Critically Ill patient  : [ ] preoperative ,   [ ] post operative   [x] Non Operative     Requires :  [x] Arterial Line   [x] Central Line  [ ] PA catheter  [ ] IABP  [ ] ECMO  [x] LVAD  [x] Ventilator  [ ] pacemaker [ ] Impella.                      [x] ABG's     [x] Pulse Oxymetry Monitoring  Bedside evaluation , monitoring , treatment of hemodynamics , fluids , IVP/ IVCD meds.        Diagnosis:     Admitted to CTU on  for melena with anemia     POD 2 - Tracheostomy     LVAD patient     Ventilator Management:  [x]AC-rest    [x]CPAP-PS Wean    [x]Trach Collar     [ ]Extubate    [ ] T-Piece  [ ]peep>5     IVCD Precedex  for vent synchrony / agitation / delirium     Hypotension a/w Hypertension, requiring intravenous medication.     Hypovolemia    Respiratory Failure - ARDS - resolving    Difficult weaning process - multiple organ system involvement in critically ill patient    Melena     Sepsis - L Pneumonia/ pneumonitis - resolving      IVCD anticoagulation with [x ] Heparin  [ ] Argatroban for LVAD circuit     Requires chest PT, pulmonary toilet, ambu bagging, suctioning to maintain SaO2,  patent airway and treat atelectasis.     Requires bedside physical therapy, mobilization and total longterm care.     Agitation / Delirium             By signing my name below, Agnieszka STANLEY, attest that this documentation has been prepared under the direction and in the presence of Luis Fernando Thompson MD.   Electronically Signed: Cesia Shaffer 21 @ 06:48    I, Luis Fernando Thompson, personally performed the services described in this documentation. All medical record entries made by the scribe were at my direction and in my presence. I have reviewed the chart and agree that the record reflects my personal performance and is accurate and complete.   Luis Fernando Thompson MD.       Discussed with CT surgeon, Physician Assistant - Nurse Practitioner- Critical care medicine team.   Discussed at  AM / PM rounds.   Chart, labs , films reviewed.    Cumulative Critical Care Time Given Today:  CRITICAL CARE ATTENDING - CTICU    MEDICATIONS  (STANDING):  aMIOdarone    Tablet 200 milliGRAM(s) Oral daily  cefepime   IVPB 1000 milliGRAM(s) IV Intermittent every 8 hours  chlorhexidine 0.12% Liquid 15 milliLiter(s) Oral Mucosa every 12 hours  chlorhexidine 2% Cloths 1 Application(s) Topical <User Schedule>  clonazePAM  Tablet 1 milliGRAM(s) Oral every 8 hours  dexMEDEtomidine Infusion 0.5 MICROgram(s)/kG/Hr (9.81 mL/Hr) IV Continuous <Continuous>  heparin  Infusion 400 Unit(s)/Hr (12.5 mL/Hr) IV Continuous <Continuous>  insulin lispro (ADMELOG) corrective regimen sliding scale   SubCutaneous every 6 hours  metoclopramide Injectable 10 milliGRAM(s) IV Push every 8 hours  metroNIDAZOLE  IVPB 500 milliGRAM(s) IV Intermittent every 8 hours  pantoprazole  Injectable 40 milliGRAM(s) IV Push every 12 hours  sodium chloride 0.9%. 1000 milliLiter(s) (10 mL/Hr) IV Continuous <Continuous>  vancomycin    Solution 500 milliGRAM(s) Oral every 6 hours                                    7.3    14.38 )-----------( 325      ( 2021 00:53 )             24.6           135  |  104  |  11  ----------------------------<  163<H>  4.0   |  20<L>  |  0.68    Ca    8.9      2021 00:53  Phos  2.6       Mg     1.8         TPro  7.0  /  Alb  3.1<L>  /  TBili  0.3  /  DBili  x   /  AST  36  /  ALT  47<H>  /  AlkPhos  227<H>        PT/INR - ( 2021 00:53 )   PT: 15.1 sec;   INR: 1.27 ratio         PTT - ( 2021 00:53 )  PTT:45.6 sec    Mode: AC/ CMV (Assist Control/ Continuous Mandatory Ventilation)  RR (machine): 12  TV (machine): 450  FiO2: 40  PEEP: 5  ITime: 1  MAP: 10  PIP: 22      Daily     Daily Weight in k.5 (2021 00:00)      06-25 @ 07: @ 07:00  --------------------------------------------------------  IN: 2172.6 mL / OUT: 2890 mL / NET: -717.4 mL     @ 07:01   @ 06:48  --------------------------------------------------------  IN: 2022.8 mL / OUT: 1810 mL / NET: 212.8 mL            Critically Ill patient  : [ ] preoperative ,   [ ] post operative   [x] Non Operative     Requires :  [x] Arterial Line   [x] Central Line  [ ] PA catheter  [ ] IABP  [ ] ECMO  [x] LVAD  [x] Ventilator  [ ] pacemaker [ ] Impella.                      [x] ABG's     [x] Pulse Oxymetry Monitoring  Bedside evaluation , monitoring , treatment of hemodynamics , fluids , IVP/ IVCD meds.        Diagnosis:     Admitted to CTU on  for melena with anemia     POD 2 - Tracheostomy     LVAD patient     Ventilator Management:  [x]AC-rest    [x]CPAP-PS Wean    [x]Trach Collar     [ ]Extubate    [ ] T-Piece  [ ]peep>5     IVCD Precedex  for vent synchrony / agitation / delirium     Hypotension a/w Hypertension, requiring intravenous medication.     Hypovolemia    Respiratory Failure - ARDS - resolving    Difficult weaning process - multiple organ system involvement in critically ill patient    Melena     Sepsis - L Pneumonia/ pneumonitis - resolving      IVCD anticoagulation with [x ] Heparin  [ ] Argatroban for LVAD circuit     Requires chest PT, pulmonary toilet, ambu bagging, suctioning to maintain SaO2,  patent airway and treat atelectasis.     Requires bedside physical therapy, mobilization and total nursing home care.     Agitation / Delirium             By signing my name below, Agnieszka STANLEY, attest that this documentation has been prepared under the direction and in the presence of Luis Fernando Thompson MD.   Electronically Signed: Cesia Shaffer 21 @ 06:48    I, Luis Fernando Thompson, personally performed the services described in this documentation. All medical record entries made by the scribe were at my direction and in my presence. I have reviewed the chart and agree that the record reflects my personal performance and is accurate and complete.   Luis Fernando Thompson MD.       Discussed with CT surgeon, Physician Assistant - Nurse Practitioner- Critical care medicine team.   Discussed at  AM / PM rounds.   Chart, labs , films reviewed.    Cumulative Critical Care Time Given Today: 30

## 2021-06-27 NOTE — PROGRESS NOTE ADULT - ASSESSMENT
66 YO M with significant PMHx S/P CABG complicated by respiratory failure. Now S/P # 8 DCT Tracheostomy POD #2.

## 2021-06-27 NOTE — PROGRESS NOTE ADULT - SUBJECTIVE AND OBJECTIVE BOX
RICKY JOINT  MRN#: [id number]  Subjective:  Pulmonary progress  : recurrent Acute hypoxic respiratory Failure ,aspiration pneumonia, NICM  , chart reviewed and H/O obtained radiological and Laboratory study reviewed patient Examined     65M PMH ACC/AHA stage D HF due to NICM HM2 LVAD , TV annuloplasty ring 17 as destination therapy due to severe peripheral artery disease with significant stenosis  SIADH, Depression, CKD-3 with hyperkalemia, past E. coli UTIs, driveline drainage (21) and COVID-19 (back in 2020)  He was recently seen in clinic where he complained of abdominal pain and dark stools w constipation back in May. He presents to [facility] ER today weakness and fatigue, moderate and + Black stools for three days, on coumadin secondary to warfarin use in the setting of an LVAD. Patient has required transfusions for GIB in the past. Mostly recently back in 2021 pt had anemia with dark stools. No interventions was done at that time. However Last Endoscopy was done in 2020 (negative). Today labs show patient is anemic with H/H of 4.5/16.3,. INR is 8.84 MAP in the 90s, Temp 35.1. He denies any chest pain, shortness of breath, dizziness, abd pain, nausea or vomiting. found to have  rectal bleeding underwent endoscopy ,old blood in the proximal ileum ,  develop sepsis with LL opacity given Antibiotics , Extubated , reintubated , Bronchoscopy on Zosyn for LL pneumonia  and Amiodarone S/P TV Annuloplasty , patient remain intubated on full ventilatory support .S/P multiple units of blood transfusion , remain on full ventilatory support on Precedex and propofol , new central IJ line , diarrhea C diff. +ve on po vancomycin and IV Flagyl,  mildly distended belly , fever start on cefepime 2gm q 8 hrs S/P tracheostomy .  new RT Subclavian central line continue on contact  isolation          (2021 16:57)    PAST MEDICAL & SURGICAL HISTORY:  CHF (congestive heart failure)    CAD (coronary artery disease)  Depression    Pleural effusion    History of 2019 novel coronavirus disease (COVID-19)  2020    Hemorrhoids    Bleeding hemorrhoids    Peripheral arterial disease    Claudication    BPH with urinary obstruction    ACC/AHA stage D systolic heart failure  Anticoagulation goal of INR 2.0 to 2.5    Falls    Clavicle fracture    CKD (chronic kidney disease), stage III    Iron deficiency anemia    H/O epistaxis    Vertigo    GI bleed    S/P TVR (tricuspid valve repair)    S/P ventricular assist device    S/P endoscopy    OBJECTIVE:  ICU Vital Signs Last 24 Hrs  T(C): 38.2 (2021 10:00), Max: 38.5 (2021 12:00)  T(F): 100.8 (2021 10:00), Max: 101.3 (2021 12:00)  HR: 65 (2021 10:00) (61 - 69)  BP: --  BP(mean): --  ABP: 105/67 (2021 10:00) (90/54 - 113/64)  ABP(mean): 77 (2021 10:00) (63 - 77)  RR: 20 (2021 10:00) (19 - 35)  SpO2: 99% (2021 10:00) (96% - 100%)       @ 07: @ 07:00  --------------------------------------------------------  IN: 2693.9 mL / OUT: 1415 mL / NET: 1278.9 mL     @ 07: @ 10:49  --------------------------------------------------------  IN: 420.8 mL / OUT: 115 mL / NET: 305.8 mL  PHYSICAL EXAM:Daily   Elderly male S/P tracheostomy  sedated  on full ventilatory support /40%20/5cm PEEP  0n vasopressin , sedated on propofol   Daily Weight in k.4 (2021 00:00)  HEENT:     + NCAT  + EOMI  - Conjuctival edema   - Icterus   - Thrush   - ETT  + NGT/OGT  Neck:         + FROM   RT SC line  JVD     - Nodes     - Masses    + Mid-line trachea + Tracheostomy  Chest: normal findings  Lungs:          + CTA   + Rhonchi    - Rales    - Wheezing     - Decreased BS   - Dullness R L  Cardiac:       + S1 + S2    + RRR   - Irregular   - S3  - S4    - Murmurs   - Rub   - Hamman’s sign   Abdomen:    + BS     + Soft    + Non-tender     - Distended    - Organomegaly  - PEG  Extremities:   - Cyanosis U/L   - Clubbing  U/L  + LE/UE Edema   + Capillary refill    + Pulses   Neuro:        - Awake   -  Alert   - Confused   - Lethargic   + Sedated  + Generalized Weakness  Skin:        - Rashes    - Erythema   + Normal incisions   + IV sites intact          HOSPITAL MEDICATIONS: All mediciations reviewed and analyzed  MEDICATIONS  (STANDING):  aMIOdarone    Tablet 200 milliGRAM(s) Oral daily  chlorhexidine 0.12% Liquid 15 milliLiter(s) Oral Mucosa every 12 hours  chlorhexidine 2% Cloths 1 Application(s) Topical <User Schedule>  dexMEDEtomidine Infusion 0.5 MICROgram(s)/kG/Hr (9.81 mL/Hr) IV Continuous <Continuous>  dextrose 50% Injectable 50 milliLiter(s) IV Push every 15 minutes  heparin  Infusion 400 Unit(s)/Hr (12.5 mL/Hr) IV Continuous <Continuous>  HYDROmorphone  Injectable 0.5 milliGRAM(s) IV Push once  insulin lispro (ADMELOG) corrective regimen sliding scale   SubCutaneous every 6 hours  pantoprazole  Injectable 40 milliGRAM(s) IV Push every 12 hours  piperacillin/tazobactam IVPB.. 3.375 Gram(s) IV Intermittent every 8 hours  propofol Infusion 20 MICROgram(s)/kG/Min (9.42 mL/Hr) IV Continuous <Continuous>  sodium chloride 0.9% lock flush 3 milliLiter(s) IV Push every 8 hours  sodium chloride 0.9%. 1000 milliLiter(s) (10 mL/Hr) IV Continuous <Continuous>    MEDICATIONS  (PRN):  acetaminophen    Suspension .. 650 milliGRAM(s) Oral every 6 hours PRN Temp greater or equal to 38C (100.4F)    LABS: All Lab data reviewed and analyzed                                   7.3    1438 )-----------( 325      ( 2021 00:53 )             24.6   06-    135  |  104  |  11  ----------------------------<  163<H>  4.0   |  20<L>  |  0.68    Ca    8.9      2021 00:53  Phos  2.6     06-27  Mg     1.8         TPro  7.0  /  Alb  3.1<L>  /  TBili  0.3  /  DBili  x   /  AST  36  /  ALT  47<H>  /  AlkPhos  227<H>      Ca    8.9      2021 00:36  Phos  2.7     -  Mg     2.0         TPro  7.1  /  Alb  3.2<L>  /  TBili  0.4  /  DBili  x   /  AST  51<H>  /  ALT  65<H>  /  AlkPhos  270<H>        Ca    8.8      2021 00:43  Phos  2.6     06-25  Mg     1.8         TPro  7.2  /  Alb  3.4  /  TBili  0.4  /  DBili  x   /  AST  76<H>  /  ALT  85<H>  /  AlkPhos  319<H>      Ca    9.0      2021 00:11  Phos  3.0     06-24  Mg     1.8         TPro  6.9  /  Alb  2.9<L>  /  TBili  0.5  /  DBili  x   /  AST  108<H>  /  ALT  97<H>  /  AlkPhos  294<H>        Ca    9.2      2021 01:00  Phos  2.2     -  Mg     2.1         TPro  6.9  /  Alb  3.3  /  TBili  0.8  /  DBili  x   /  AST  142<H>  /  ALT  87<H>  /  AlkPhos  244<H>               PTT - ( 2021 04:52 )  PTT:45.2 sec LIVER FUNCTIONS - ( 2021 00:42 )  Alb: 3.4 g/dL / Pro: 6.7 g/dL / ALK PHOS: 213 U/L / ALT: 15 U/L / AST: 24 U/L / GGT: x           RADIOLOGY: - Reviewed and analyzed LLL  pneumonia , LVAD HM2, CT scan of abdomen reviewed result noted

## 2021-06-27 NOTE — PROGRESS NOTE ADULT - SUBJECTIVE AND OBJECTIVE BOX
INFECTIOUS DISEASES FOLLOW UP-- Anitra Prater  [phone]    This is a follow up note for this  66yMale with  Anemia  CDI infection        ROS:  CONSTITUTIONAL:  No fever, sitting in a chair  CARDIOVASCULAR:  No chest pain or palpitations  RESPIRATORY:  No dyspnea  GASTROINTESTINAL:  No nausea, vomiting, diarrhea, or abdominal pain  GENITOURINARY:  No dysuria  NEUROLOGIC:  No headache,     Allergies    No Known Allergies    Intolerances        ANTIBIOTICS/RELEVANT:  antimicrobials  cefepime   IVPB 1000 milliGRAM(s) IV Intermittent every 8 hours  metroNIDAZOLE  IVPB 500 milliGRAM(s) IV Intermittent every 8 hours  vancomycin    Solution 500 milliGRAM(s) Oral every 6 hours    immunologic:    OTHER:  acetaminophen    Suspension .. 650 milliGRAM(s) Oral every 6 hours PRN  aMIOdarone    Tablet 200 milliGRAM(s) Oral daily  chlorhexidine 0.12% Liquid 15 milliLiter(s) Oral Mucosa every 12 hours  chlorhexidine 2% Cloths 1 Application(s) Topical <User Schedule>  clonazePAM  Tablet 1 milliGRAM(s) Oral every 8 hours  dexMEDEtomidine Infusion 0.5 MICROgram(s)/kG/Hr IV Continuous <Continuous>  heparin  Infusion 400 Unit(s)/Hr IV Continuous <Continuous>  insulin lispro (ADMELOG) corrective regimen sliding scale   SubCutaneous every 6 hours  metoclopramide Injectable 10 milliGRAM(s) IV Push every 8 hours  pantoprazole  Injectable 40 milliGRAM(s) IV Push every 12 hours  sodium chloride 0.9%. 1000 milliLiter(s) IV Continuous <Continuous>      Objective:  Vital Signs Last 24 Hrs  T(C): 37.2 (27 Jun 2021 12:00), Max: 37.8 (26 Jun 2021 16:00)  T(F): 99 (27 Jun 2021 12:00), Max: 100 (26 Jun 2021 16:00)  HR: 60 (27 Jun 2021 12:00) (58 - 75)  BP: --  BP(mean): --  RR: 16 (27 Jun 2021 12:00) (13 - 43)  SpO2: 100% (27 Jun 2021 12:00) (95% - 100%)    PHYSICAL EXAM:  Constitutional:no acute distress sitting in a chair, low grade fevers  Eyes:ALONSO, EOMI  Ear/Nose/Throat: no oral lesions, trach site CDI	  Respiratory: clear BL anteriorly  Cardiovascular: S1S2 VAD sounds  Gastrointestinal:soft,  dressing CDI VAD  Extremities:no e/e/c  No Lymphadenopathy  IV sites not inflammed.    LABS:                        7.3    14.38 )-----------( 325      ( 27 Jun 2021 00:53 )             24.6     06-27    135  |  104  |  11  ----------------------------<  163<H>  4.0   |  20<L>  |  0.68    Ca    8.9      27 Jun 2021 00:53  Phos  2.6     06-27  Mg     1.8     06-27    TPro  7.0  /  Alb  3.1<L>  /  TBili  0.3  /  DBili  x   /  AST  36  /  ALT  47<H>  /  AlkPhos  227<H>  06-27    PT/INR - ( 27 Jun 2021 00:53 )   PT: 15.1 sec;   INR: 1.27 ratio         PTT - ( 27 Jun 2021 00:53 )  PTT:45.6 sec      MICROBIOLOGY:            RECENT CULTURES:  06-26 @ 06:53  .Blood Blood-Peripheral  --  --  --    No growth to date.  --  06-23 @ 22:49  .Blood Blood  --  --  --    No growth to date.  --  06-23 @ 12:38  .Sputum Sputum trap  Citrobacter freundii  Klebsiella pneumoniae  Citrobacter freundii  CLAU    Numerous Citrobacter freundii  Moderate Klebsiella pneumoniae  Normal Respiratory Bria present  --  06-20 @ 20:56  .Sputum Sputum  --  --  --    Normal Respiratory Bria present  --  06-20 @ 16:31  .Blood Blood-Peripheral  Blood Culture PCR  Blood Culture PCR  PCR    No Growth Final  --      RADIOLOGY & ADDITIONAL STUDIES:    < from: Xray Chest 1 View- PORTABLE-Urgent (Xray Chest 1 View- PORTABLE-Urgent .) (06.27.21 @ 09:16) >  IMPRESSION:    Enteric tube tip in the pylorus versus proximal duodenum.    Left lower lobe patchy opacities and right basilar atelectasis, similar to prior.    < end of copied text >

## 2021-06-27 NOTE — PROGRESS NOTE ADULT - ASSESSMENT
imp/rx:  Possible aspiration pneumonitis at time of endoscopic procedure  Completed a course of therapy with Zosyn      Recent course complicated by C.diff diarrhea    Abdomen somewhat distended- but soft  Agree with Vanco oral 500mg qid  Continue IV flagyl 500mg q 8hr  No BMs overnight- stool output improving    Leukocytosis and CXR atelectasis vs pneumonia  sputum with Citrobacter and Klebsiella  started on Cefepime on 6/25  will plan to treat for VAP x 5 days    Morris Prater MD  [phone]  After 5pm/weekends [phone]

## 2021-06-28 NOTE — OCCUPATIONAL THERAPY INITIAL EVALUATION ADULT - LIVES WITH, PROFILE
Per chart review, pt lives in a house with his friend, +7 steps to enter, +first floor set-up. PTA IND with ADL using cane/walker at times.

## 2021-06-28 NOTE — PROGRESS NOTE ADULT - ATTENDING COMMENTS
Patient had increased work of breathing and more secretions despite resting on ventilator. Precedex increase did not calm his agitation so he was given dilaudid 2 mg IV by the CTU team and he is now sleeping soundly.     On appropriate antibiotics for pneumonia & C.diff.  Hgb currently stable, although he had a FOBT+. PTT 50. . Lactate 0.6.  VAD interrogation without alarms or events, no changes made.    Continue current care. Trach collar trials, as tolerated, per CTU team.

## 2021-06-28 NOTE — PHYSICAL THERAPY INITIAL EVALUATION ADULT - PRECAUTIONS/LIMITATIONS, REHAB EVAL
He was recently seen in clinic where he complained of abdominal pain and dark stools w constipation back in May.He presents to [facility] ER today weakness and fatigue, moderate and + Black stools for three days, on coumadin secondary to warfarin use in the setting of an LVAD.  Found to have  rectal bleeding underwent endoscopy, found old blood in the proximal ileum. Was found to have CAD S/P CABG on 6/18. Pt failed weaning trials and is not a candidate for extubation as per primary team. Now S/P trach/cardiac precautions/fall precautions/sternal precautions

## 2021-06-28 NOTE — PROGRESS NOTE ADULT - SUBJECTIVE AND OBJECTIVE BOX
RICKY HAMPTON  MRN-[id number]  Patient is a 66y old  Male who presents with a chief complaint of Anemia, Supratherapeutic INR, Dark Stools (2021 18:28)    HPI:  65M PMH ACC/AHA stage D HF due to NICM HM2 LVAD , TV annuloplasty ring 17 as destination therapy due to severe peripheral artery disease with significant stenosis  SIADH, Depression, CKD-3 with hyperkalemia, past E. coli UTIs, driveline drainage (21) and COVID-19 (back in 2020)  He was recently seen in clinic where he complained of abdominal pain and dark stools w constipation back in May. He presents to [facility] ER today weakness and fatigue, moderate and + Black stools for three days, on coumadin secondary to warfarin use in the setting of an LVAD. Patient has required transfusions for GIB in the past. Mostly recently back in 2021 pt had anemia with dark stools. No interventions was done at that time. However Last Endoscopy was done in 2020 (negative). Today labs show patient is anemic with H/H of 4.5/16.3,. INR is 8.84 MAP in the 90s, Temp 35.1. He denies any chest pain, shortness of breath, dizziness, abd pain, nausea or vomiting.       (2021 16:57)      Surgery/Hospital course:   admit for melena w/ anemia, INR 8.84   6/15 Capsul study (+) for small bowel bleed, balloon endoscopy (old blood in prox ileum); post EGD - septic w/ L opacity, re-intubated for concern for aspiration, TTE (Mod MR, decrease biV w/ interventricular septum boweing towards R)   bronch    TC    CT C/A/P: Fluid filled colon which may be 2/2 rapid transit. Small bilateral pleffs with associates. Compressive atelectasis New ISABELLE & LLL  parenchymal opacities, suspicious for pneumonia. 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    #8 Shiley trach at bedside    CPAP trials     REVIEW OF SYSTEMS:  Unable to obtain due to patient being intubated    Vital Signs Last 24 Hrs  T(C): 37.5 (2021 16:00), Max: 37.8 (2021 04:00)  T(F): 99.5 (2021 16:00), Max: 100 (2021 04:00)  HR: 83 (2021 19:00) (64 - 129)  BP: --  BP(mean): --  RR: 21 (2021 19:00) (15 - 59)  SpO2: 100% (2021 19:00) (95% - 100%)    ============================I/O===========================   I&O's Detail    2021 07:01  -  2021 07:00  --------------------------------------------------------  IN:    Dexmedetomidine: 517.2 mL    Enteral Tube Flush: 100 mL    Heparin: 300 mL    IV PiggyBack: 400 mL    Miscellaneous Tube Feedin mL    sodium chloride 0.9%: 240 mL  Total IN: 2697.2 mL    OUT:    Indwelling Catheter - Urethral (mL): 1990 mL  Total OUT: 1990 mL    Total NET: 707.2 mL      2021 07:01  -  2021 20:45  --------------------------------------------------------  IN:    Dexmedetomidine: 76.2 mL    Heparin: 150 mL    IV PiggyBack: 50 mL    Miscellaneous Tube Feedin mL    sodium chloride 0.9%: 120 mL  Total IN: 696.2 mL    OUT:    Emesis (mL): 150 mL    Indwelling Catheter - Urethral (mL): 655 mL  Total OUT: 805 mL    Total NET: -108.8 mL        ============================ LABS =========================                        7.6    14.05 )-----------( 370      ( 2021 00:52 )             25.9         137  |  105  |  12  ----------------------------<  162<H>  4.0   |  20<L>  |  0.66    Ca    8.9      2021 00:52  Phos  2.7       Mg     1.7         TPro  7.2  /  Alb  3.0<L>  /  TBili  0.3  /  DBili  x   /  AST  31  /  ALT  38  /  AlkPhos  214<H>      LIVER FUNCTIONS - ( 2021 00:52 )  Alb: 3.0 g/dL / Pro: 7.2 g/dL / ALK PHOS: 214 U/L / ALT: 38 U/L / AST: 31 U/L / GGT: x           PT/INR - ( 2021 00:53 )   PT: 15.1 sec;   INR: 1.27 ratio         PTT - ( 2021 00:52 )  PTT:50.2 sec  ABG - ( 2021 00:38 )  pH, Arterial: 7.46  pH, Blood: x     /  pCO2: 34    /  pO2: 145   / HCO3: 24    / Base Excess: 1.0   /  SaO2: 99                  ======================Microbiology/Radiology=================  Culture: Reviewed   CXR: Reviewed  ======================================================  PAST MEDICAL & SURGICAL HISTORY:  CHF (congestive heart failure)    CAD (coronary artery disease)    Depression    Pleural effusion    History of 2019 novel coronavirus disease (COVID-19)  2020    Hemorrhoids    Bleeding hemorrhoids    Peripheral arterial disease    Claudication    BPH with urinary obstruction    ACC/AHA stage D systolic heart failure    Anticoagulation goal of INR 2.0 to 2.5    Falls    Clavicle fracture    CKD (chronic kidney disease), stage III    Iron deficiency anemia    H/O epistaxis    Vertigo    GI bleed    S/P TVR (tricuspid valve repair)    S/P ventricular assist device    S/P endoscopy      ====================ASSESSMENT ==============  Stage D Nonischemic Cardiomyopathy, Status Post HM2 on 2017   Recent driveline infection on 2021   Cardiogenic shock  Hemodynamic instability   Hypovolemic shock  Acute hypoxemic respiratory failure  Aspiration pneumonia  Septic shock  GI bleed , Status Post Enteroscopy   Anemia, in setting of melena   Chronic Kidney Disease  Leukocytosis  Coagulopathy   Stress hyperglycemia   C.diff positive on    Fevers    Plan:  ====================== NEUROLOGY=====================  Sedated w/ IV Precedex, Continue to monitor neuro status.   Continue with Tylenol for pain management  Continue Clonazepam for anxiety, will start wean.       acetaminophen    Suspension .. 650 milliGRAM(s) Oral every 6 hours PRN Temp greater or equal to 38C (100.4F)  clonazePAM  Tablet 0.5 milliGRAM(s) Oral every 8 hours  dexMEDEtomidine Infusion 0.5 MICROgram(s)/kG/Hr (9.81 mL/Hr) IV Continuous <Continuous>      ==================== RESPIRATORY======================  Respiratory status required full ventilatory support with intermittent weaning to pressure support and trach collar, close monitoring of respiratory rate and breathing pattern, the following of ABG’s with A-line monitoring, continuous pulse oximetry monitoring. Patient has a moderate amount of thin secretions which require intermittent suctioning. Secretions were increased with associated coughing after several hours on trach collar. If this is a recurrent problem, we may consider a chest x ray to evaluated for flash pulmonary edema. Baseline chest xray is relatively clear.    Mechanical Ventilation:  Mode: AC/ CMV (Assist Control/ Continuous Mandatory Ventilation)  RR (machine): 12  TV (machine): 450  FiO2: 40  PEEP: 5  ITime: 1  MAP: 10  PIP: 26      ====================CARDIOVASCULAR==================  Stage D Nonischemic Cardiomyopathy, Status Post HM2 on 2017   Recent driveline infection on 2021   Cardiogenic shock  Hemodynamic instability   Hypovolemic shock  HM2 settings: was initially 8000 but increased to 9000 rpm due to MR and to help with volume overload after transfusion and volume resuscitation.   Echo 6/15: severe global Left ventricular systolic dysfunction with HM2 in place, decreased Right ventricular systolic function, interventricular septums bows slightly to right, otherwise grossly similar to prior.   Plan for follow up echo.   Rate and rhythm control with Amiodarone.   Invasive hemodynamic monitoring, assess perfusion indices.     aMIOdarone    Tablet 200 milliGRAM(s) Oral daily    ===================HEMATOLOGIC/ONC ===================  Anemia due to acute blood loss associated with small bowel bleed.   No current plan for transfusion.   Monitor H&H/Plts  IV heparin for LVAD will also cover for venous thromboembolism prophylaxis in addition to sequential compression devices    Monitor H&H/Plts   heparin  Infusion 400 Unit(s)/Hr (12.5 mL/Hr) IV Continuous <Continuous>    ===================== RENAL =========================  Continue monitoring urine output, I&Os, Bun/Cr    ==================== GASTROINTESTINAL===================  Tolerating Vital AF TF at goal rate of 60 cc per hour.   Protonix for stress ulcer prophylaxis.   Reglan for gut motility.   Patient has Cdiff colitis, but diarrhea improved today.    metoclopramide Injectable 10 milliGRAM(s) IV Push every 8 hours  pantoprazole  Injectable 40 milliGRAM(s) IV Push every 12 hours  sodium chloride 0.9%. 1000 milliLiter(s) (10 mL/Hr) IV Continuous <Continuous>    =======================    ENDOCRINE  =====================  Metabolic stability, stress hyperglycemia required a lispro Insulin sliding scale while following serial glucose levels to help achieve and maintain euglycemia.      insulin lispro (ADMELOG) corrective regimen sliding scale   SubCutaneous every 6 hours    ========================INFECTIOUS DISEASE================  Temperature 99.5F-> 100F with white blood count at 14.05.   Continue to monitor temperature and white blood count.   Continue Metronidazole and Vancomycin for C diff prophylaxis.   Continue Cefepime for tracheobronchitis.   Vancomycin for Cdiff colitis.    cefepime   IVPB 1000 milliGRAM(s) IV Intermittent every 8 hours  metroNIDAZOLE  IVPB 500 milliGRAM(s) IV Intermittent every 8 hours  vancomycin    Solution 500 milliGRAM(s) Oral every 6 hours        Patient requires continuous monitoring with bedside rhythm monitoring, pulse ox monitoring, and intermittent blood gas analysis. Care plan discussed with ICU care team. Patient remained critical and at risk for life threatening decompensation.    By signing my name below, I, Daniela Chowdary, attest that this documentation has been prepared under the direction and in the presence of Jus Jensen MD   Electronically signed: Marisel Sureshibe    Jus STANLEY, personally performed the services described in this documentation. all medical record entries made by the scribe were at my direction and in my presence. I have reviewed the chart and agree that the record reflects my personal performance and is accurate and complete  Electronically signed: Jus Jensen MD 21 @ 20:45

## 2021-06-28 NOTE — PROGRESS NOTE ADULT - SUBJECTIVE AND OBJECTIVE BOX
Subjective:        Medications:  acetaminophen    Suspension .. 650 milliGRAM(s) Oral every 6 hours PRN  aMIOdarone    Tablet 200 milliGRAM(s) Oral daily  cefepime   IVPB 1000 milliGRAM(s) IV Intermittent every 8 hours  chlorhexidine 0.12% Liquid 15 milliLiter(s) Oral Mucosa every 12 hours  chlorhexidine 2% Cloths 1 Application(s) Topical <User Schedule>  clonazePAM  Tablet 1 milliGRAM(s) Oral every 8 hours  dexMEDEtomidine Infusion 0.5 MICROgram(s)/kG/Hr IV Continuous <Continuous>  heparin  Infusion 400 Unit(s)/Hr IV Continuous <Continuous>  insulin lispro (ADMELOG) corrective regimen sliding scale   SubCutaneous every 6 hours  metoclopramide Injectable 10 milliGRAM(s) IV Push every 8 hours  metroNIDAZOLE  IVPB 500 milliGRAM(s) IV Intermittent every 8 hours  pantoprazole  Injectable 40 milliGRAM(s) IV Push every 12 hours  propofol Infusion 20 MICROgram(s)/kG/Min IV Continuous <Continuous>  sodium chloride 0.9%. 1000 milliLiter(s) IV Continuous <Continuous>  vancomycin    Solution 500 milliGRAM(s) Oral every 6 hours      Physical Exam:    Vitals:  ICU Vital Signs Last 24 Hrs  T(C): 37.5 (2021 08:00), Max: 37.8 (2021 04:00)  T(F): 99.5 (2021 08:00), Max: 100 (2021 04:00)  HR: 82 (2021 11:00) (57 - 87)  ABP: 126/73 (2021 11:00) (85/54 - 128/76)  ABP(mean): 91 (2021 11:00) (64 - 96)  RR: 32 (2021 11:00) (15 - 59)  SpO2: 100% (2021 11:00) (99% - 100%)        LVAD Interrogation:   Heart Mate 2  Speed 9000  Flow 4.2 -  4.9  Power 5.0- 5.2  PI: 5.5 - 6  No events. No changes made    Weight in k (- @ 00:00)      I&O's Summary    2021 07:01  -  2021 07:00  --------------------------------------------------------  IN: 2697.2 mL / OUT: 1990 mL / NET: 707.2 mL    2021 07:01  -  2021 11:41  --------------------------------------------------------  IN: 365.5 mL / OUT: 140 mL / NET: 225.5 mL      Tele: SR    General: No distress. Comfortable.  HEENT: EOM intact. NGT in place with TF running  Neck: Neck supple. JVP < 6cm H2O. No masses  Chest: Clear to auscultation bilaterally  CV: LVAD hum. No LE edema  Abdomen: Soft, non-distended, non-tender  Skin: Warm peripherally. Saturated RCW CVC dressing  Neurology: Alert. Sensation intact  Psych: MARELY, on vent    Labs:                        7.6    14.05 )-----------( 370      ( 2021 00:52 )             25.9          137  |  105  |  12  ----------------------------<  162<H>  4.0   |  20<L>  |  0.66    Ca    8.9      2021 00:52  Phos  2.7       Mg     1.7         TPro  7.2  /  Alb  3.0<L>  /  TBili  0.3  /  DBili  x   /  AST  31  /  ALT  38  /  AlkPhos  214<H>      PT/INR - ( 2021 00:53 )   PT: 15.1 sec;   INR: 1.27 ratio         PTT - ( 2021 00:52 )  PTT:50.2 sec            Lactate Dehydrogenase, Serum: 351 U/L ()  Lactate Dehydrogenase, Serum: 347 U/L ( @ 00:36)  Lactate Dehydrogenase, Serum: 394 U/L ( @ 00:43)  Lactate Dehydrogenase, Serum: 402 U/L ( @ 08:37)     Subjective: seen lying quietly in bed. He is A&O X 3        Medications:  acetaminophen    Suspension .. 650 milliGRAM(s) Oral every 6 hours PRN  aMIOdarone    Tablet 200 milliGRAM(s) Oral daily  cefepime   IVPB 1000 milliGRAM(s) IV Intermittent every 8 hours  chlorhexidine 0.12% Liquid 15 milliLiter(s) Oral Mucosa every 12 hours  chlorhexidine 2% Cloths 1 Application(s) Topical <User Schedule>  clonazePAM  Tablet 1 milliGRAM(s) Oral every 8 hours  dexMEDEtomidine Infusion 0.5 MICROgram(s)/kG/Hr IV Continuous <Continuous>  heparin  Infusion 400 Unit(s)/Hr IV Continuous <Continuous>  insulin lispro (ADMELOG) corrective regimen sliding scale   SubCutaneous every 6 hours  metoclopramide Injectable 10 milliGRAM(s) IV Push every 8 hours  metroNIDAZOLE  IVPB 500 milliGRAM(s) IV Intermittent every 8 hours  pantoprazole  Injectable 40 milliGRAM(s) IV Push every 12 hours  propofol Infusion 20 MICROgram(s)/kG/Min IV Continuous <Continuous>  sodium chloride 0.9%. 1000 milliLiter(s) IV Continuous <Continuous>  vancomycin    Solution 500 milliGRAM(s) Oral every 6 hours      Physical Exam:    Vitals:  ICU Vital Signs Last 24 Hrs  T(C): 37.5 (2021 08:00), Max: 37.8 (2021 04:00)  T(F): 99.5 (2021 08:00), Max: 100 (2021 04:00)  HR: 82 (2021 11:00) (57 - 87)  ABP: 126/73 (2021 11:00) (85/54 - 128/76)  ABP(mean): 91 (2021 11:00) (64 - 96)  RR: 32 (2021 11:00) (15 - 59)  SpO2: 100% (2021 11:00) (99% - 100%)        LVAD Interrogation:   Heart Mate 2  Speed 9000  Flow 4.2 -  4.9  Power 5.0- 5.2  PI: 5.5 - 6  No events. No changes made    Weight in k (06-26 @ 00:00)      I&O's Summary    2021 07:01  -  2021 07:00  --------------------------------------------------------  IN: 2697.2 mL / OUT: 1990 mL / NET: 707.2 mL    2021 07:01  -  2021 11:41  --------------------------------------------------------  IN: 365.5 mL / OUT: 140 mL / NET: 225.5 mL      Tele: SR    General: No distress. Comfortable.  HEENT: EOM intact. NGT in place with TF running  Neck: Neck supple.  No masses  Chest: Clear to auscultation bilaterally  CV: LVAD hum. No LE edema  Abdomen: Soft, non-distended, non-tender  Skin: Warm peripherally. CVC dressing, driveline dressing is C/D/I  Neurology: Alert. Sensation intact  Psych: MARELY, on vent    Labs:                        7.6    14.05 )-----------( 370      ( 2021 00:52 )             25.9          137  |  105  |  12  ----------------------------<  162<H>  4.0   |  20<L>  |  0.66    Ca    8.9      2021 00:52  Phos  2.7       Mg     1.7         TPro  7.2  /  Alb  3.0<L>  /  TBili  0.3  /  DBili  x   /  AST  31  /  ALT  38  /  AlkPhos  214<H>      PT/INR - ( 2021 00:53 )   PT: 15.1 sec;   INR: 1.27 ratio         PTT - ( 2021 00:52 )  PTT:50.2 sec            Lactate Dehydrogenase, Serum: 351 U/L ()  Lactate Dehydrogenase, Serum: 347 U/L ( @ 00:36)  Lactate Dehydrogenase, Serum: 394 U/L ( @ 00:43)  Lactate Dehydrogenase, Serum: 402 U/L ( @ 08:37)

## 2021-06-28 NOTE — OCCUPATIONAL THERAPY INITIAL EVALUATION ADULT - PRECAUTIONS/LIMITATIONS, REHAB EVAL
He presents to [facility] ER today weakness and fatigue, moderate and + Black stools for three days, on coumadin secondary to warfarin use in the setting of an LVAD.  Found to have  rectal bleeding underwent endoscopy, found old blood in the proximal ileum. Was found to have CAD S/P CABG on 6/18. Pt failed weaning trials and is not a candidate for extubation as per primary team. Now S/P trach./cardiac precautions/fall precautions/sternal precautions/surgical precautions

## 2021-06-28 NOTE — PROGRESS NOTE ADULT - SUBJECTIVE AND OBJECTIVE BOX
RICKY HAMPTON  MRN-[id number]  Patient is a 66y old  Male who presents with a chief complaint of Anemia, Supratherapeutic INR, Dark Stools (28 Jun 2021 11:39)    HPI:  65M PMH ACC/AHA stage D HF due to NICM HM2 LVAD , TV annuloplasty ring 9/12/17 as destination therapy due to severe peripheral artery disease with significant stenosis  SIADH, Depression, CKD-3 with hyperkalemia, past E. coli UTIs, driveline drainage (1/7/21) and COVID-19 (back in April 2020)  He was recently seen in clinic where he complained of abdominal pain and dark stools w constipation back in May. He presents to [facility] ER today weakness and fatigue, moderate and + Black stools for three days, on coumadin secondary to warfarin use in the setting of an LVAD. Patient has required transfusions for GIB in the past. Mostly recently back in jan 2021 pt had anemia with dark stools. No interventions was done at that time. However Last Endoscopy was done in July 2020 (negative). Today labs show patient is anemic with H/H of 4.5/16.3,. INR is 8.84 MAP in the 90s, Temp 35.1. He denies any chest pain, shortness of breath, dizziness, abd pain, nausea or vomiting.       (14 Jun 2021 16:57)      Surgery/Hospital course:  6/14 admit for melena w/ anemia, INR 8.84   6/15 Capsul study (+) for small bowel bleed, balloon endoscopy (old blood in prox ileum); post EGD - septic w/ L opacity, re-intubated for concern for aspiration, TTE (Mod MR, decrease biV w/ interventricular septum boweing towards R)  6/17 bronch   6/22 TC   6/22 CT C/A/P: Fluid filled colon which may be 2/2 rapid transit. Small bilateral pleffs with associates. Compressive atelectasis New ISABELLE & LLL  parenchymal opacities, suspicious for pneumonia. 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   6/25 #8 Ricky pritchard at bedside     Today/Overnight: Will start to wean down Klonopin. Precedex has been stopped and will try to wake patient up and get out of bed. Will transition CPAP to trach collar in the afternoon. Patient had no bowel movement today.     Vital Signs Last 24 Hrs  T(C): 37.5 (28 Jun 2021 12:00), Max: 37.8 (28 Jun 2021 04:00)  T(F): 99.5 (28 Jun 2021 12:00), Max: 100 (28 Jun 2021 04:00)  HR: 91 (28 Jun 2021 12:02) (57 - 91)  BP: --  BP(mean): --  RR: 29 (28 Jun 2021 12:00) (15 - 59)  SpO2: 100% (28 Jun 2021 12:02) (98% - 100%)  ============================I/O===========================  I&O's Summary    27 Jun 2021 07:01  -  28 Jun 2021 07:00  --------------------------------------------------------  IN: 2697.2 mL / OUT: 1990 mL / NET: 707.2 mL    28 Jun 2021 07:01  -  28 Jun 2021 12:30  --------------------------------------------------------  IN: 365.5 mL / OUT: 140 mL / NET: 225.5 mL      ============================ LABS =========================                      7.6    14.05 )-----------( 370      ( 28 Jun 2021 00:52 )             25.9     06-28    137  |  105  |  12  ----------------------------<  162<H>  4.0   |  20<L>  |  0.66    Ca    8.9      28 Jun 2021 00:52  Phos  2.7     06-28  Mg     1.7     06-28    TPro  7.2  /  Alb  3.0<L>  /  TBili  0.3  /  DBili  x   /  AST  31  /  ALT  38  /  AlkPhos  214<H>  06-28    LIVER FUNCTIONS - ( 28 Jun 2021 00:52 )  Alb: 3.0 g/dL / Pro: 7.2 g/dL / ALK PHOS: 214 U/L / ALT: 38 U/L / AST: 31 U/L / GGT: x           PT/INR - ( 27 Jun 2021 00:53 )   PT: 15.1 sec;   INR: 1.27 ratio         PTT - ( 28 Jun 2021 00:52 )  PTT:50.2 sec  ABG - ( 28 Jun 2021 00:38 )  pH, Arterial: 7.46  pH, Blood: x     /  pCO2: 34    /  pO2: 145   / HCO3: 24    / Base Excess: 1.0   /  SaO2: 99                  ======================Micro/Rad/Cardio=================  Culture: Reviewed   CXR: Reviewed  Echo: Reviewed  ======================================================  PAST MEDICAL & SURGICAL HISTORY:  CHF (congestive heart failure)    CAD (coronary artery disease)    Depression    Pleural effusion    History of 2019 novel coronavirus disease (COVID-19)  april 2020    Hemorrhoids    Bleeding hemorrhoids    Peripheral arterial disease    Claudication    BPH with urinary obstruction    ACC/AHA stage D systolic heart failure    Anticoagulation goal of INR 2.0 to 2.5    Falls    Clavicle fracture    CKD (chronic kidney disease), stage III    Iron deficiency anemia    H/O epistaxis    Vertigo    GI bleed    S/P TVR (tricuspid valve repair)    S/P ventricular assist device    S/P endoscopy      ========================ASSESSMENT ================  Stage D Nonischemic Cardiomyopathy, Status Post HM2 on 9/2017   Recent driveline infection on 1/2021   Acute hypoxemic respiratory failure  Aspiration pneumonia  Septic shock  Cardiogenic shock  GI bleed , Status Post Enteroscopy 6/14  Anemia, in setting of melena   Chronic Kidney Disease  Leukocytosis  Coagulopathy   Stress hyperglycemia   Hemodynamic instability   Hypovolemic shock  C.diff positive on 6/20     Plan:  ====================== NEUROLOGY=====================  Continue to monitor neuro status. Addressed analgesic regimen to optimize function and sedated for ventilatory synchrony. Continue Clonazepam for anxiety.     ==================== RESPIRATORY======================  Respiratory status required full ventilatory support along with BiPAP/CPAP, close monitoring of respiratory rate and breathing pattern, the following of ABG’s with A-line monitoring, continuous pulse oximetry monitoring    ====================CARDIOVASCULAR==================  Stage D Nonischemic cardiomyopathy, s/p hm2 on 9/2017; recent driveline infection on 1/2021; HM2 settings: was initially 8000 but increased to 9000 rpm due to MR and to help with volume overload after transfusion and volume resuscitation. Echo 6/15: severe global Left ventricular systolic dysfunction with HM2 in place, decreased Right ventricular systolic function, interventricular septums bows slightly to right, otherwise grossly similar to prior. Rate and rhythm control with Amiodarone. Continue invasive hemodynamic monitoring with a central venous catheter & an A-line were required for continuous central venous pressure and MAP/BP monitoring to ensure adequate cardiovascular support. Monitor continuous telemetry.     ===================HEMATOLOGIC/ONC ===================  Anemia due to acute blood loss. No current plan for transfusion. Monitor hemoglobin and hematocrit levels. IV heparin will also cover for venous thromboembolism prophylaxis in addition to sequential compression devices    ===================== RENAL =========================  Optimize renal perfusion with adequate volume resuscitation and continued monitoring of urine output, fluid balance, and BUN/Creatinine. Continue with diuretics to maintain net even fluid balance.     ==================== GASTROINTESTINAL===================  Tolerating Vital AF TF at goal rate of 60 cc per hour. Protonix for stress ulcer prophylaxis. Reglan for gut motility     =======================    ENDOCRINE  =====================  Metabolic stability, stress hyperglycemia required a lispro Insulin sliding scale while following serial glucose levels to help achieve and maintain euglycemia.      ========================INFECTIOUS DISEASE================  Temperature 99.5 with white blood count at 14.05. Continue to monitor temperature and white blood count. Continue Metronidazole and Vancomycin for C diff prophylaxis. Continue Cefepime for tracheobronchitis.       Patient requires continuous monitoring with bedside rhythm monitoring, pulse oximetry monitoring, and continuous central venous and arterial pressure monitoring; and intermittent blood gas analysis.  Care plan discussed with ICU care team.    Patient remained critical, at risk for life threatening decompensation.   I have spent ___ minutes providing acute care with multiple re-evaluations throughout the evening.     By signing my name below, I, Rojas Junior, attest that this documentation has been prepared under the direction and in the presence of Aleksandra Marte MD   Electronically signed: Cesia Cohen, 06-28-21 @ 12:30    I, Aleksandra Marte, personally performed the services described in this documentation. All medical record entries made by the scribe were at my direction and in my presence. I have reviewed the chart and agree that the record reflects my personal performance and is accurate and complete  Electronically signed: Aleksandra Marte MD 06-28-21 @ 12:30       RICKY HAMPTON  MRN-[id number]  Patient is a 66y old  Male who presents with a chief complaint of Anemia, Supratherapeutic INR, Dark Stools (28 Jun 2021 11:39)    HPI:  65M PMH ACC/AHA stage D HF due to NICM HM2 LVAD , TV annuloplasty ring 9/12/17 as destination therapy due to severe peripheral artery disease with significant stenosis  SIADH, Depression, CKD-3 with hyperkalemia, past E. coli UTIs, driveline drainage (1/7/21) and COVID-19 (back in April 2020)  He was recently seen in clinic where he complained of abdominal pain and dark stools w constipation back in May. He presents to [facility] ER today weakness and fatigue, moderate and + Black stools for three days, on coumadin secondary to warfarin use in the setting of an LVAD. Patient has required transfusions for GIB in the past. Mostly recently back in jan 2021 pt had anemia with dark stools. No interventions was done at that time. However Last Endoscopy was done in July 2020 (negative). Today labs show patient is anemic with H/H of 4.5/16.3,. INR is 8.84 MAP in the 90s, Temp 35.1. He denies any chest pain, shortness of breath, dizziness, abd pain, nausea or vomiting.       (14 Jun 2021 16:57)      Surgery/Hospital course:  6/14 admit for melena w/ anemia, INR 8.84   6/15 Capsul study (+) for small bowel bleed, balloon endoscopy (old blood in prox ileum); post EGD - septic w/ L opacity, re-intubated for concern for aspiration, TTE (Mod MR, decrease biV w/ interventricular septum boweing towards R)  6/17 bronch   6/22 TC   6/22 CT C/A/P: Fluid filled colon which may be 2/2 rapid transit. Small bilateral pleffs with associates. Compressive atelectasis New ISABELLE & LLL  parenchymal opacities, suspicious for pneumonia. 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   6/25 #8 Ricky pritchard at bedside     Today/Overnight: Will start to wean down Klonopin. Precedex has been stopped and will try to wake patient up and get out of bed and into chair. Will transition CPAP to trach collar in the afternoon. Patient had no bowel movement today.     Vital Signs Last 24 Hrs  T(C): 37.5 (28 Jun 2021 12:00), Max: 37.8 (28 Jun 2021 04:00)  T(F): 99.5 (28 Jun 2021 12:00), Max: 100 (28 Jun 2021 04:00)  HR: 91 (28 Jun 2021 12:02) (57 - 91)  BP: --  BP(mean): --  RR: 29 (28 Jun 2021 12:00) (15 - 59)  SpO2: 100% (28 Jun 2021 12:02) (98% - 100%)  ============================I/O===========================  I&O's Summary    27 Jun 2021 07:01  -  28 Jun 2021 07:00  --------------------------------------------------------  IN: 2697.2 mL / OUT: 1990 mL / NET: 707.2 mL    28 Jun 2021 07:01  -  28 Jun 2021 12:30  --------------------------------------------------------  IN: 365.5 mL / OUT: 140 mL / NET: 225.5 mL      ============================ LABS =========================                      7.6    14.05 )-----------( 370      ( 28 Jun 2021 00:52 )             25.9     06-28    137  |  105  |  12  ----------------------------<  162<H>  4.0   |  20<L>  |  0.66    Ca    8.9      28 Jun 2021 00:52  Phos  2.7     06-28  Mg     1.7     06-28    TPro  7.2  /  Alb  3.0<L>  /  TBili  0.3  /  DBili  x   /  AST  31  /  ALT  38  /  AlkPhos  214<H>  06-28    LIVER FUNCTIONS - ( 28 Jun 2021 00:52 )  Alb: 3.0 g/dL / Pro: 7.2 g/dL / ALK PHOS: 214 U/L / ALT: 38 U/L / AST: 31 U/L / GGT: x           PT/INR - ( 27 Jun 2021 00:53 )   PT: 15.1 sec;   INR: 1.27 ratio         PTT - ( 28 Jun 2021 00:52 )  PTT:50.2 sec  ABG - ( 28 Jun 2021 00:38 )  pH, Arterial: 7.46  pH, Blood: x     /  pCO2: 34    /  pO2: 145   / HCO3: 24    / Base Excess: 1.0   /  SaO2: 99                  ======================Micro/Rad/Cardio=================  Culture: Reviewed   CXR: Reviewed  Echo: Reviewed  ======================================================  PAST MEDICAL & SURGICAL HISTORY:  CHF (congestive heart failure)    CAD (coronary artery disease)    Depression    Pleural effusion    History of 2019 novel coronavirus disease (COVID-19)  april 2020    Hemorrhoids    Bleeding hemorrhoids    Peripheral arterial disease    Claudication    BPH with urinary obstruction    ACC/AHA stage D systolic heart failure    Anticoagulation goal of INR 2.0 to 2.5    Falls    Clavicle fracture    CKD (chronic kidney disease), stage III    Iron deficiency anemia    H/O epistaxis    Vertigo    GI bleed    S/P TVR (tricuspid valve repair)    S/P ventricular assist device    S/P endoscopy      ========================ASSESSMENT ================  Stage D Nonischemic Cardiomyopathy, Status Post HM2 on 9/2017   Recent driveline infection on 1/2021   Acute hypoxemic respiratory failure  Aspiration pneumonia  Septic shock  Cardiogenic shock  GI bleed , Status Post Enteroscopy 6/14  Anemia, in setting of melena   Chronic Kidney Disease  Leukocytosis  Coagulopathy   Stress hyperglycemia   Hemodynamic instability   Hypovolemic shock  C.diff positive on 6/20     Plan:  ====================== NEUROLOGY=====================  Continue to monitor neuro status. Addressed analgesic regimen to optimize function and sedated for ventilatory synchrony. Continue Clonazepam for anxiety, will start wean. Precedex has been stopped and will try to wake patient up and get out of bed to chair.      ==================== RESPIRATORY======================  Respiratory status required full ventilatory support along with BiPAP/CPAP, close monitoring of respiratory rate and breathing pattern, the following of ABG’s with A-line monitoring, continuous pulse oximetry monitoring. Will transition from CPAP to TC in the afternoon.     ====================CARDIOVASCULAR==================  Stage D Nonischemic cardiomyopathy, s/p hm2 on 9/2017; recent driveline infection on 1/2021; HM2 settings: was initially 8000 but increased to 9000 rpm due to MR and to help with volume overload after transfusion and volume resuscitation. Echo 6/15: severe global Left ventricular systolic dysfunction with HM2 in place, decreased Right ventricular systolic function, interventricular septums bows slightly to right, otherwise grossly similar to prior. Rate and rhythm control with Amiodarone. Continue invasive hemodynamic monitoring with a central venous catheter & an A-line were required for continuous central venous pressure and MAP/BP monitoring to ensure adequate cardiovascular support. Monitor continuous telemetry.     ===================HEMATOLOGIC/ONC ===================  Anemia due to acute blood loss. No current plan for transfusion. Monitor hemoglobin and hematocrit levels. IV heparin will also cover for venous thromboembolism prophylaxis in addition to sequential compression devices    ===================== RENAL =========================  Optimize renal perfusion with adequate volume resuscitation and continued monitoring of urine output, fluid balance, and BUN/Creatinine. Continue with diuretics to maintain net even fluid balance.     ==================== GASTROINTESTINAL===================  Tolerating Vital AF TF at goal rate of 60 cc per hour. Protonix for stress ulcer prophylaxis. Reglan for gut motility. Patient had no bowel movement today.    =======================    ENDOCRINE  =====================  Metabolic stability, stress hyperglycemia required a lispro Insulin sliding scale while following serial glucose levels to help achieve and maintain euglycemia.      ========================INFECTIOUS DISEASE================  Temperature 99.5 with white blood count at 14.05. Continue to monitor temperature and white blood count. Continue Metronidazole and Vancomycin for C diff prophylaxis. Continue Cefepime for tracheobronchitis.       Patient requires continuous monitoring with bedside rhythm monitoring, pulse oximetry monitoring, and continuous central venous and arterial pressure monitoring; and intermittent blood gas analysis.  Care plan discussed with ICU care team.    Patient remained critical, at risk for life threatening decompensation.   I have spent ___ minutes providing acute care with multiple re-evaluations throughout the evening.     By signing my name below, I, Rojas Junior, attest that this documentation has been prepared under the direction and in the presence of Aleksandra Marte MD   Electronically signed: Cesia Cohen, 06-28-21 @ 12:30    I, Aleksandra Marte, personally performed the services described in this documentation. All medical record entries made by the luisibmel were at my direction and in my presence. I have reviewed the chart and agree that the record reflects my personal performance and is accurate and complete  Electronically signed: Aleksandra Marte MD 06-28-21 @ 12:30       RICKY HAMPTON  MRN-[id number]  Patient is a 66y old  Male who presents with a chief complaint of Anemia, Supratherapeutic INR, Dark Stools (28 Jun 2021 11:39)    HPI:  65M PMH ACC/AHA stage D HF due to NICM HM2 LVAD , TV annuloplasty ring 9/12/17 as destination therapy due to severe peripheral artery disease with significant stenosis  SIADH, Depression, CKD-3 with hyperkalemia, past E. coli UTIs, driveline drainage (1/7/21) and COVID-19 (back in April 2020)  He was recently seen in clinic where he complained of abdominal pain and dark stools w constipation back in May. He presents to [facility] ER today weakness and fatigue, moderate and + Black stools for three days, on coumadin secondary to warfarin use in the setting of an LVAD. Patient has required transfusions for GIB in the past. Mostly recently back in jan 2021 pt had anemia with dark stools. No interventions was done at that time. However Last Endoscopy was done in July 2020 (negative). Today labs show patient is anemic with H/H of 4.5/16.3,. INR is 8.84 MAP in the 90s, Temp 35.1. He denies any chest pain, shortness of breath, dizziness, abd pain, nausea or vomiting.       (14 Jun 2021 16:57)      Surgery/Hospital course:  6/14 admit for melena w/ anemia, INR 8.84   6/15 Capsul study (+) for small bowel bleed, balloon endoscopy (old blood in prox ileum); post EGD - septic w/ L opacity, re-intubated for concern for aspiration, TTE (Mod MR, decrease biV w/ interventricular septum boweing towards R)  6/17 bronch   6/22 TC   6/22 CT C/A/P: Fluid filled colon which may be 2/2 rapid transit. Small bilateral pleffs with associates. Compressive atelectasis New ISABELLE & LLL  parenchymal opacities, suspicious for pneumonia. 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   6/25 #8 Ricky pritchard at bedside     Today/Overnight: Will start to wean down Klonopin. Requires Prececex for agitated delirium. CPAP weaning as tolerated.    Vital Signs Last 24 Hrs  T(C): 37.5 (28 Jun 2021 12:00), Max: 37.8 (28 Jun 2021 04:00)  T(F): 99.5 (28 Jun 2021 12:00), Max: 100 (28 Jun 2021 04:00)  HR: 91 (28 Jun 2021 12:02) (57 - 91)  BP: --  BP(mean): --  RR: 29 (28 Jun 2021 12:00) (15 - 59)  SpO2: 100% (28 Jun 2021 12:02) (98% - 100%)  ============================I/O===========================  I&O's Summary    27 Jun 2021 07:01  -  28 Jun 2021 07:00  --------------------------------------------------------  IN: 2697.2 mL / OUT: 1990 mL / NET: 707.2 mL    28 Jun 2021 07:01  -  28 Jun 2021 12:30  --------------------------------------------------------  IN: 365.5 mL / OUT: 140 mL / NET: 225.5 mL      ============================ LABS =========================                      7.6    14.05 )-----------( 370      ( 28 Jun 2021 00:52 )             25.9     06-28    137  |  105  |  12  ----------------------------<  162<H>  4.0   |  20<L>  |  0.66    Ca    8.9      28 Jun 2021 00:52  Phos  2.7     06-28  Mg     1.7     06-28    TPro  7.2  /  Alb  3.0<L>  /  TBili  0.3  /  DBili  x   /  AST  31  /  ALT  38  /  AlkPhos  214<H>  06-28    LIVER FUNCTIONS - ( 28 Jun 2021 00:52 )  Alb: 3.0 g/dL / Pro: 7.2 g/dL / ALK PHOS: 214 U/L / ALT: 38 U/L / AST: 31 U/L / GGT: x           PT/INR - ( 27 Jun 2021 00:53 )   PT: 15.1 sec;   INR: 1.27 ratio         PTT - ( 28 Jun 2021 00:52 )  PTT:50.2 sec  ABG - ( 28 Jun 2021 00:38 )  pH, Arterial: 7.46  pH, Blood: x     /  pCO2: 34    /  pO2: 145   / HCO3: 24    / Base Excess: 1.0   /  SaO2: 99        ======================Micro/Rad/Cardio=================  Culture: Reviewed   CXR: Reviewed  Echo: Reviewed  ======================================================  PAST MEDICAL & SURGICAL HISTORY:  CHF (congestive heart failure)    CAD (coronary artery disease)    Depression    Pleural effusion    History of 2019 novel coronavirus disease (COVID-19)  april 2020    Hemorrhoids    Bleeding hemorrhoids    Peripheral arterial disease    Claudication    BPH with urinary obstruction    ACC/AHA stage D systolic heart failure    Anticoagulation goal of INR 2.0 to 2.5    Falls    Clavicle fracture    CKD (chronic kidney disease), stage III    Iron deficiency anemia    H/O epistaxis    Vertigo    GI bleed    S/P TVR (tricuspid valve repair)    S/P ventricular assist device    S/P endoscopy      ========================ASSESSMENT ================  Stage D Nonischemic Cardiomyopathy, Status Post HM2 on 9/2017   Recent driveline infection on 1/2021   Acute hypoxemic respiratory failure  Aspiration pneumonia  Septic shock  Cardiogenic shock  GI bleed , Status Post Enteroscopy 6/14  Anemia, in setting of melena   Chronic Kidney Disease  Leukocytosis  Coagulopathy   Stress hyperglycemia   Hemodynamic instability   Hypovolemic shock  C.diff positive on 6/20     Plan:  ====================== NEUROLOGY=====================  Continue to monitor neuro status. Addressed analgesic regimen to optimize function and sedated for ventilatory synchrony. Continue Clonazepam for anxiety, will start wean. Precedex has been stopped and will try to wake patient up and get out of bed to chair.      ==================== RESPIRATORY======================  Respiratory status required full ventilatory support with intermittent weaning to pressure support and trach collar, close monitoring of respiratory rate and breathing pattern, the following of ABG’s with A-line monitoring, continuous pulse oximetry monitoring. Patient has a moderate amount of thin secretions which require intermittent suctioning. Secretions were increased with associated coughing after several hours on trach collar. If this is a recurrent problem, we may consider a chest x ray to evaluated for flash pulmonary edema. Baseline chest xray is relatively clear.    ====================CARDIOVASCULAR==================  Stage D Nonischemic cardiomyopathy, s/p hm2 on 9/2017; recent driveline infection on 1/2021; HM2 settings: was initially 8000 but increased to 9000 rpm due to MR and to help with volume overload after transfusion and volume resuscitation. Echo 6/15: severe global Left ventricular systolic dysfunction with HM2 in place, decreased Right ventricular systolic function, interventricular septums bows slightly to right, otherwise grossly similar to prior. Plan for follow up echo. Rate and rhythm control with Amiodarone. Continue invasive hemodynamic monitoring with a central venous catheter & an A-line were required for continuous central venous pressure and MAP/BP monitoring to ensure adequate cardiovascular support. Monitor continuous telemetry.     ===================HEMATOLOGIC/ONC ===================  Anemia due to acute blood loss associated with small bowel bleed. No current plan for transfusion. Monitor hemoglobin and hematocrit levels. IV heparin for LVAD will also cover for venous thromboembolism prophylaxis in addition to sequential compression devices    ===================== RENAL =========================  Optimize renal perfusion with adequate volume resuscitation and continued monitoring of urine output, fluid balance, and BUN/Creatinine. Continue with diuretics to maintain net even fluid balance.     ==================== GASTROINTESTINAL===================  Tolerating Vital AF TF at goal rate of 60 cc per hour. Protonix for stress ulcer prophylaxis. Reglan for gut motility. Patient has Cdiff colitis, but diarrhea improved today.    =======================    ENDOCRINE  =====================  Metabolic stability, stress hyperglycemia required a lispro Insulin sliding scale while following serial glucose levels to help achieve and maintain euglycemia.      ========================INFECTIOUS DISEASE================  Temperature 99.5 with white blood count at 14.05. Continue to monitor temperature and white blood count. Continue Metronidazole and Vancomycin for C diff prophylaxis. Continue Cefepime for tracheobronchitis. Vancomycin po and Flagyl for Cdiff colitis.      Patient requires continuous monitoring with bedside rhythm monitoring, pulse oximetry monitoring, and continuous central venous and arterial pressure monitoring; and intermittent blood gas analysis.  Care plan discussed with ICU care team.    Patient remained critical, at risk for life threatening decompensation.   I have spent 30 minutes providing acute care with multiple re-evaluations throughout the day.     By signing my name below, I, Rojas Junior, attest that this documentation has been prepared under the direction and in the presence of Aleksandra Marte MD   Electronically signed: Cesia Cohen, 06-28-21 @ 12:30    I, Aleksandra Marte, personally performed the services described in this documentation. All medical record entries made by the scribe were at my direction and in my presence. I have reviewed the chart and agree that the record reflects my personal performance and is accurate and complete  Electronically signed: Aleksandra Marte MD 06-28-21 @ 12:30

## 2021-06-28 NOTE — OCCUPATIONAL THERAPY INITIAL EVALUATION ADULT - PERTINENT HX OF CURRENT PROBLEM, REHAB EVAL
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 coli UTIs, driveline drainage (1/7/21) and COVID-19 (back in April 2020)  He was recently seen in clinic where he complained of abdominal pain and dark stools w constipation back in May.

## 2021-06-28 NOTE — OCCUPATIONAL THERAPY INITIAL EVALUATION ADULT - NS ASR FOLLOW COMMAND OT EVAL
command follow limited at times by pt's effort/75% of the time/able to follow single-step instructions

## 2021-06-28 NOTE — PHYSICAL THERAPY INITIAL EVALUATION ADULT - PERTINENT HX OF CURRENT PROBLEM, REHAB EVAL
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 coli UTIs, driveline drainage (1/7/21) and COVID-19 (back in April 2020)

## 2021-06-28 NOTE — PROGRESS NOTE ADULT - SUBJECTIVE AND OBJECTIVE BOX
ENT ISSUE/POD: S/P #8 DCT Tracheostomy POD # 3      HPI: 66 YO M with significant PMHx, admitted for weakness and fatigue, was found to have CAD S/P CABG on 6/18. ENT called to evaluate for tracheostomy.  Now S/P # 8 DCT Tracheostomy POD # 3. Doing well on the ventilator. No reported issues    PAST MEDICAL & SURGICAL HISTORY:  CHF (congestive heart failure)    CAD (coronary artery disease)    Depression    Pleural effusion    History of 2019 novel coronavirus disease (COVID-19)  april 2020    Hemorrhoids    Bleeding hemorrhoids    Peripheral arterial disease    Claudication    BPH with urinary obstruction    ACC/AHA stage D systolic heart failure    Anticoagulation goal of INR 2.0 to 2.5    Falls    Clavicle fracture    CKD (chronic kidney disease), stage III    Iron deficiency anemia    H/O epistaxis    Vertigo    GI bleed    S/P TVR (tricuspid valve repair)    S/P ventricular assist device    S/P endoscopy      Allergies    No Known Allergies    Intolerances      MEDICATIONS  (STANDING):  aMIOdarone    Tablet 200 milliGRAM(s) Oral daily  cefepime   IVPB 1000 milliGRAM(s) IV Intermittent every 8 hours  chlorhexidine 0.12% Liquid 15 milliLiter(s) Oral Mucosa every 12 hours  chlorhexidine 2% Cloths 1 Application(s) Topical <User Schedule>  clonazePAM  Tablet 1 milliGRAM(s) Oral every 8 hours  dexMEDEtomidine Infusion 0.5 MICROgram(s)/kG/Hr (9.81 mL/Hr) IV Continuous <Continuous>  heparin  Infusion 400 Unit(s)/Hr (12.5 mL/Hr) IV Continuous <Continuous>  insulin lispro (ADMELOG) corrective regimen sliding scale   SubCutaneous every 6 hours  metoclopramide Injectable 10 milliGRAM(s) IV Push every 8 hours  metroNIDAZOLE  IVPB 500 milliGRAM(s) IV Intermittent every 8 hours  pantoprazole  Injectable 40 milliGRAM(s) IV Push every 12 hours  sodium chloride 0.9%. 1000 milliLiter(s) (10 mL/Hr) IV Continuous <Continuous>  vancomycin    Solution 500 milliGRAM(s) Oral every 6 hours    MEDICATIONS  (PRN):  acetaminophen    Suspension .. 650 milliGRAM(s) Oral every 6 hours PRN Temp greater or equal to 38C (100.4F)      ROS:   Unable to obtain due to pts clinical condition       Vital Signs Last 24 Hrs  T(C): 37.8 (28 Jun 2021 04:00), Max: 37.8 (28 Jun 2021 04:00)  T(F): 100 (28 Jun 2021 04:00), Max: 100 (28 Jun 2021 04:00)  HR: 67 (28 Jun 2021 07:00) (57 - 87)  BP: --  BP(mean): --  RR: 17 (28 Jun 2021 07:00) (14 - 59)  SpO2: 100% (28 Jun 2021 07:00) (97% - 100%)                          7.6    14.05 )-----------( 370      ( 28 Jun 2021 00:52 )             25.9    06-28    137  |  105  |  12  ----------------------------<  162<H>  4.0   |  20<L>  |  0.66    Ca    8.9      28 Jun 2021 00:52  Phos  2.7     06-28  Mg     1.7     06-28    TPro  7.2  /  Alb  3.0<L>  /  TBili  0.3  /  DBili  x   /  AST  31  /  ALT  38  /  AlkPhos  214<H>  06-28   PT/INR - ( 27 Jun 2021 00:53 )   PT: 15.1 sec;   INR: 1.27 ratio         PTT - ( 28 Jun 2021 00:52 )  PTT:50.2 sec    PHYSICAL EXAM:  Gen: NAD, On Vent.   Skin: No rashes, bruises, or lesions.  Head: Normocephalic, Atraumatic.  Face: no edema, erythema, or fluctuance. Parotid glands soft without mass.  Eyes: no scleral injection.  Nose: Nares bilaterally patent, no discharge  Mouth: No Stridor / Drooling / Trismus.  Mucosa moist, tongue/uvula midline, oropharynx clear  Neck: # 8 DCT Trach secured with sutures x 4 and Umbilical Tie. Minimal oozing of serosanguinous secretions,.  No bleeding noted. No hematoma/crepitus. Flat, supple, no lymphadenopathy, trachea midline, no masses.  Lymphatic: No lymphadenopathy  Resp: On Vent.   Neuro: Unable to obtain.

## 2021-06-28 NOTE — CHART NOTE - NSCHARTNOTEFT_GEN_A_CORE
Nutrition Follow Up Note    Patient seen for: Nutritional follow up     Source: RN, Medical record, CTU team     Chart reviewed, events noted. 65 year old male with PMHx of NICM, s/p  HM2 LVAD in 2017, on coumadin, CKD 3 presents with anemia, dark stools and supra therapeutic  INR.  S/p capsule study, and s/p endoscopy. Course c/b possible aspiration event. GI team had been following.    C-Diff +, being treated with vancomycin   S/p tracheostomy on     Diet: NPO with EN via NGT  Vital AF 1.2 to goal rate of 60ml/[id number]. Will provide: 1728kcals and 108gm protein (22kcals/kg and 1.37gm pro/kg based on dosing weight: 78.5kg  EN provision:  Vital AF currently infusing at 50ml/bon96hen. Will review with team.     Patient reports:  Pt seen sitting up in bed.   Pt having multiple loose BM's at this time. Will attempt to bulk with Banatrol.     Daily Weight in k.6 (-), Weight in k.5 (-27), Weight in k (-), Weight in k.7 (-25), Weight in k.6 (-24), Weight in k.7 (-), Weight in k.7 ()    Drug Dosing Weight  Weight (kg): 78.5 (15 Jamil 2021 12:14)  BMI (kg/m2): 24.8 (15 Jamil 2021 12:14)      Pertinent Medications: MEDICATIONS  (STANDING):  aMIOdarone    Tablet 200 milliGRAM(s) Oral daily  cefepime   IVPB 1000 milliGRAM(s) IV Intermittent every 8 hours  chlorhexidine 0.12% Liquid 15 milliLiter(s) Oral Mucosa every 12 hours  chlorhexidine 2% Cloths 1 Application(s) Topical <User Schedule>  clonazePAM  Tablet 0.5 milliGRAM(s) Oral every 8 hours  dexMEDEtomidine Infusion 0.5 MICROgram(s)/kG/Hr (9.81 mL/Hr) IV Continuous <Continuous>  heparin  Infusion 400 Unit(s)/Hr (12.5 mL/Hr) IV Continuous <Continuous>  insulin lispro (ADMELOG) corrective regimen sliding scale   SubCutaneous every 6 hours  metoclopramide Injectable 10 milliGRAM(s) IV Push every 8 hours  metroNIDAZOLE  IVPB 500 milliGRAM(s) IV Intermittent every 8 hours  pantoprazole  Injectable 40 milliGRAM(s) IV Push every 12 hours  sodium chloride 0.9%. 1000 milliLiter(s) (10 mL/Hr) IV Continuous <Continuous>  vancomycin    Solution 500 milliGRAM(s) Oral every 6 hours    MEDICATIONS  (PRN):  acetaminophen    Suspension .. 650 milliGRAM(s) Oral every 6 hours PRN Temp greater or equal to 38C (100.4F)      LABS:    @ 00:52: Sodium 137, Potassium 4.0, Calcium 8.9, Magnesium 1.7, Phosphorus 2.7, BUN 12, Creatinine 0.66, Glucose 162<H>  Hemoglobin : 7.6 g/dL  Hematocrit : 25.9 %        A1C with Estimated Average Glucose Result: 5.6 % (06-15-21 @ 02:42)          POCT Blood Glucose.: 177 mg/dL (21 @ 12:44)  POCT Blood Glucose.: 201 mg/dL (21 @ 05:36)  POCT Blood Glucose.: 182 mg/dL (21 @ 23:17)  POCT Blood Glucose.: 161 mg/dL (21 @ 18:17)     LIVER FUNCTIONS - ( 2021 00:52 )  Alb: 3.0 g/dL / Pro: 7.2 g/dL / ALK PHOS: 214 U/L / ALT: 38 U/L / AST: 31 U/L / GGT: x           Triglycerides, Serum: 145 mg/dL (21 @ 00:53)  Triglycerides, Serum: 191 mg/dL (21 @ 00:36)  Triglycerides, Serum: 183 mg/dL (21 @ 00:43)  Triglycerides, Serum: 518 mg/dL (21 @ 09:02)        Finger Sticks:  POCT Blood Glucose.: 177 mg/dL ( @ 12:44)  POCT Blood Glucose.: 201 mg/dL ( @ 05:36)  POCT Blood Glucose.: 182 mg/dL ( @ 23:17)  POCT Blood Glucose.: 161 mg/dL (06-27 @ 18:17)      Skin per nursing documentation: intact  Edema: +1 generalized     Estimated Needs:   [ X] no change since previous assessment  Estimated Energy Needs: Based on dosing weight: 78.5kg   20-25 shantelle/kg   · Calculated From (shantelle/kg)	1570  · Calculated To (shantelle/kg)	1962     Estimated Protein Needs:  1.2-1.4 gm pro/kg   · Calculated From (g/kg)	94.2  · Calculated To (g/kg)	109.9    Previous Nutrition Diagnosis: acute moderate malnutrition   Nutrition Diagnosis is: on going, being addressed with escalation of nutrition support.     New Nutrition Diagnosis: None      Interventions:     Recommend  1.Continue increasing EN Vital AF 1.2 to goal rate of 60ml/[id number]. Juventino provide: 1728kcals and 108gm protein (22kcals/kg and 1.37gm pro/kg based on dosing weight: 78.5kg)  2. Add Banatrol x2 daily to help with stool bulking   3. Trend GI tolerance  4. Trend BG levels, renal indices, LFT's, electrolytes    Monitoring and Evaluation:   Continue to monitor nutritional intake, tolerance to diet prescription, weights, labs, skin integrity  RD remains available upon request and will follow up per protocol  Clarisa Greene RD, CDN, [facility] Pager #[phone].

## 2021-06-28 NOTE — PHYSICAL THERAPY INITIAL EVALUATION ADULT - IMPAIRED TRANSFERS: SIT/STAND, REHAB EVAL
impaired balance/impaired coordination/decreased flexibility/narrow base of support/impaired postural control/decreased strength

## 2021-06-28 NOTE — OCCUPATIONAL THERAPY INITIAL EVALUATION ADULT - DIAGNOSIS, OT EVAL
Pt presents with questionable cognition, decreased strength, endurance, balance, and coordination, all impacting ability to perform ADLs and functional mobility.

## 2021-06-28 NOTE — PROGRESS NOTE ADULT - SUBJECTIVE AND OBJECTIVE BOX
INFECTIOUS DISEASES FOLLOW UP-- Anitra Prater  [phone]    This is a follow up note for this  66yMale with  Anemia  C.diff diarrhea  Aspiration pneumonia        ROS:  CONSTITUTIONAL:  awake, but not interactive    Allergies    No Known Allergies    Intolerances        ANTIBIOTICS/RELEVANT:  antimicrobials  cefepime   IVPB 1000 milliGRAM(s) IV Intermittent every 8 hours  metroNIDAZOLE  IVPB 500 milliGRAM(s) IV Intermittent every 8 hours  vancomycin    Solution 500 milliGRAM(s) Oral every 6 hours    immunologic:    OTHER:  acetaminophen    Suspension .. 650 milliGRAM(s) Oral every 6 hours PRN  aMIOdarone    Tablet 200 milliGRAM(s) Oral daily  chlorhexidine 0.12% Liquid 15 milliLiter(s) Oral Mucosa every 12 hours  chlorhexidine 2% Cloths 1 Application(s) Topical <User Schedule>  clonazePAM  Tablet 0.5 milliGRAM(s) Oral every 8 hours  dexMEDEtomidine Infusion 0.5 MICROgram(s)/kG/Hr IV Continuous <Continuous>  furosemide   Injectable 20 milliGRAM(s) IV Push once  heparin  Infusion 400 Unit(s)/Hr IV Continuous <Continuous>  insulin lispro (ADMELOG) corrective regimen sliding scale   SubCutaneous every 6 hours  metoclopramide Injectable 10 milliGRAM(s) IV Push every 8 hours  pantoprazole  Injectable 40 milliGRAM(s) IV Push every 12 hours  sodium chloride 0.9%. 1000 milliLiter(s) IV Continuous <Continuous>      Objective:  Vital Signs Last 24 Hrs  T(C): 37.5 (28 Jun 2021 16:00), Max: 37.8 (28 Jun 2021 04:00)  T(F): 99.5 (28 Jun 2021 16:00), Max: 100 (28 Jun 2021 04:00)  HR: 84 (28 Jun 2021 17:45) (57 - 129)  BP: --  BP(mean): --  RR: 28 (28 Jun 2021 17:45) (15 - 59)  SpO2: 100% (28 Jun 2021 17:45) (96% - 100%)    PHYSICAL EXAM:  Constitutional:no acute distress  Eyes:ALONSO, EOMI  Ear/Nose/Throat: no oral lesions, trach site CDI	  Respiratory: audible bilat decreased left base  right chest subclavian CVC  Cardiovascular: VAD sounds  Gastrointestinal:soft, (+) BS, no tenderness  Extremities:no e/e/c  No Lymphadenopathy  IV sites not inflammed.    LABS:                        7.6    14.05 )-----------( 370      ( 28 Jun 2021 00:52 )             25.9     06-28    137  |  105  |  12  ----------------------------<  162<H>  4.0   |  20<L>  |  0.66    Ca    8.9      28 Jun 2021 00:52  Phos  2.7     06-28  Mg     1.7     06-28    TPro  7.2  /  Alb  3.0<L>  /  TBili  0.3  /  DBili  x   /  AST  31  /  ALT  38  /  AlkPhos  214<H>  06-28    PT/INR - ( 27 Jun 2021 00:53 )   PT: 15.1 sec;   INR: 1.27 ratio         PTT - ( 28 Jun 2021 00:52 )  PTT:50.2 sec      MICROBIOLOGY:            RECENT CULTURES:  06-26 @ 06:53  .Blood Blood-Peripheral  --  --  --    No growth to date.  --  06-23 @ 22:49  .Blood Blood  --  --  --    No growth to date.  --  06-23 @ 12:38  .Sputum Sputum trap  Citrobacter freundii  Klebsiella pneumoniae  Citrobacter freundii  CLAU    Numerous Citrobacter freundii  Moderate Klebsiella pneumoniae  Normal Respiratory Bria present  --      RADIOLOGY & ADDITIONAL STUDIES:    < from: Xray Chest 1 View- PORTABLE-Urgent (Xray Chest 1 View- PORTABLE-Urgent .) (06.28.21 @ 15:27) >  The heart is slightly enlarged. Platelike atelectasis right lower lobe. The left lung appears to be clear. A tracheostomy tube is in good position. A central line seen on the right and the tip is in the right atrium. [place] feeding tube was placed and the tipis in the antrum of the stomach. A loop recorder is overlying the left lower hemithorax. A left ventricular assisted device is present.    < end of copied text >

## 2021-06-28 NOTE — PROGRESS NOTE ADULT - SUBJECTIVE AND OBJECTIVE BOX
RICKY JOINT  MRN#: [id number]  Subjective:  Pulmonary progress  : recurrent Acute hypoxic respiratory Failure ,aspiration pneumonia, NICM  , chart reviewed and H/O obtained radiological and Laboratory study reviewed patient Examined     65M PMH ACC/AHA stage D HF due to NICM HM2 LVAD , TV annuloplasty ring 17 as destination therapy due to severe peripheral artery disease with significant stenosis  SIADH, Depression, CKD-3 with hyperkalemia, past E. coli UTIs, driveline drainage (21) and COVID-19 (back in 2020)  He was recently seen in clinic where he complained of abdominal pain and dark stools w constipation back in May. He presents to [facility] ER today weakness and fatigue, moderate and + Black stools for three days, on coumadin secondary to warfarin use in the setting of an LVAD. Patient has required transfusions for GIB in the past. Mostly recently back in 2021 pt had anemia with dark stools. No interventions was done at that time. However Last Endoscopy was done in 2020 (negative). Today labs show patient is anemic with H/H of 4.5/16.3,. INR is 8.84 MAP in the 90s, Temp 35.1. He denies any chest pain, shortness of breath, dizziness, abd pain, nausea or vomiting. found to have  rectal bleeding underwent endoscopy ,old blood in the proximal ileum ,  develop sepsis with LL opacity given Antibiotics , Extubated , reintubated , Bronchoscopy on Zosyn for LL pneumonia  and Amiodarone S/P TV Annuloplasty , patient remain intubated on full ventilatory support .S/P multiple units of blood transfusion , remain on full ventilatory support on Precedex and propofol , new central IJ line , diarrhea C diff. +ve on po vancomycin and IV Flagyl,  mildly distended belly , fever start on cefepime 2gm q 8 hrs S/P tracheostomy .  new RT Subclavian central line continue on contact  isolation ,still diarrhea on C-diff antibiotics ENT follow up appreciated , trial of C-PAP as tolerated          (2021 16:57)    PAST MEDICAL & SURGICAL HISTORY:  CHF (congestive heart failure)    CAD (coronary artery disease)  Depression    Pleural effusion    History of 2019 novel coronavirus disease (COVID-19)  2020    Hemorrhoids    Bleeding hemorrhoids    Peripheral arterial disease    Claudication    BPH with urinary obstruction    ACC/AHA stage D systolic heart failure  Anticoagulation goal of INR 2.0 to 2.5    Falls    Clavicle fracture    CKD (chronic kidney disease), stage III    Iron deficiency anemia    H/O epistaxis    Vertigo    GI bleed    S/P TVR (tricuspid valve repair)    S/P ventricular assist device    S/P endoscopy    OBJECTIVE:  ICU Vital Signs Last 24 Hrs  T(C): 38.2 (2021 10:00), Max: 38.5 (2021 12:00)  T(F): 100.8 (2021 10:00), Max: 101.3 (2021 12:00)  HR: 65 (2021 10:00) (61 - 69)  BP: --  BP(mean): --  ABP: 105/67 (2021 10:00) (90/54 - 113/64)  ABP(mean): 77 (2021 10:00) (63 - 77)  RR: 20 (2021 10:00) (19 - 35)  SpO2: 99% (2021 10:00) (96% - 100%)       @ 07:  -   @ 07:00  --------------------------------------------------------  IN: 2693.9 mL / OUT: 1415 mL / NET: 1278.9 mL     @ 07: @ 10:49  --------------------------------------------------------  IN: 420.8 mL / OUT: 115 mL / NET: 305.8 mL  PHYSICAL EXAM:Daily   Elderly male S/P tracheostomy  sedated  on full ventilatory support /40%20/5cm PEEP  0n vasopressin , sedated on propofol   Daily Weight in k.4 (2021 00:00)  HEENT:     + NCAT  + EOMI  - Conjuctival edema   - Icterus   - Thrush   - ETT  + NGT/OGT  Neck:         + FROM   RT SC line  JVD     - Nodes     - Masses    + Mid-line trachea + Tracheostomy  Chest: normal findings  Lungs:          + CTA   + Rhonchi    - Rales    - Wheezing     - Decreased BS   - Dullness R L  Cardiac:       + S1 + S2    + RRR   - Irregular   - S3  - S4    - Murmurs   - Rub   - Hamman’s sign   Abdomen:    + BS     + Soft    + Non-tender     - Distended    - Organomegaly  - PEG  Extremities:   - Cyanosis U/L   - Clubbing  U/L  + LE/UE Edema   + Capillary refill    + Pulses   Neuro:        - Awake   -  Alert   - Confused   - Lethargic   + Sedated  + Generalized Weakness  Skin:        - Rashes    - Erythema   + Normal incisions   + IV sites intact          HOSPITAL MEDICATIONS: All mediciations reviewed and analyzed  MEDICATIONS  (STANDING):  aMIOdarone    Tablet 200 milliGRAM(s) Oral daily  chlorhexidine 0.12% Liquid 15 milliLiter(s) Oral Mucosa every 12 hours  chlorhexidine 2% Cloths 1 Application(s) Topical <User Schedule>  dexMEDEtomidine Infusion 0.5 MICROgram(s)/kG/Hr (9.81 mL/Hr) IV Continuous <Continuous>  dextrose 50% Injectable 50 milliLiter(s) IV Push every 15 minutes  heparin  Infusion 400 Unit(s)/Hr (12.5 mL/Hr) IV Continuous <Continuous>  HYDROmorphone  Injectable 0.5 milliGRAM(s) IV Push once  insulin lispro (ADMELOG) corrective regimen sliding scale   SubCutaneous every 6 hours  pantoprazole  Injectable 40 milliGRAM(s) IV Push every 12 hours  piperacillin/tazobactam IVPB.. 3.375 Gram(s) IV Intermittent every 8 hours  propofol Infusion 20 MICROgram(s)/kG/Min (9.42 mL/Hr) IV Continuous <Continuous>  sodium chloride 0.9% lock flush 3 milliLiter(s) IV Push every 8 hours  sodium chloride 0.9%. 1000 milliLiter(s) (10 mL/Hr) IV Continuous <Continuous>    MEDICATIONS  (PRN):  acetaminophen    Suspension .. 650 milliGRAM(s) Oral every 6 hours PRN Temp greater or equal to 38C (100.4F)    LABS: All Lab data reviewed and analyzed                                    7.6    14.05 )-----------( 370      ( 2021 00:52 )             25.9    06-28    137  |  105  |  12  ----------------------------<  162<H>  4.0   |  20<L>  |  0.66    Ca    8.9      2021 00:52  Phos  2.7       Mg     1.7         TPro  7.2  /  Alb  3.0<L>  /  TBili  0.3  /  DBili  x   /  AST  31  /  ALT  38  /  AlkPhos  214<H>        Ca    8.9      2021 00:53  Phos  2.6     -  Mg     1.8         TPro  7.0  /  Alb  3.1<L>  /  TBili  0.3  /  DBili  x   /  AST  36  /  ALT  47<H>  /  AlkPhos  227<H>      Ca    8.9      2021 00:36  Phos  2.7     -  Mg     2.0         TPro  7.1  /  Alb  3.2<L>  /  TBili  0.4  /  DBili  x   /  AST  51<H>  /  ALT  65<H>  /  AlkPhos  270<H>        Ca    8.8      2021 00:43  Phos  2.6     -25  Mg     1.8         TPro  7.2  /  Alb  3.4  /  TBili  0.4  /  DBili  x   /  AST  76<H>  /  ALT  85<H>  /  AlkPhos  319<H>               PTT - ( 2021 04:52 )  PTT:45.2 sec LIVER FUNCTIONS - ( 2021 00:42 )  Alb: 3.4 g/dL / Pro: 6.7 g/dL / ALK PHOS: 213 U/L / ALT: 15 U/L / AST: 24 U/L / GGT: x           RADIOLOGY: - Reviewed and analyzed LLL  pneumonia , LVAD HM2, CT scan of abdomen reviewed result noted

## 2021-06-28 NOTE — OCCUPATIONAL THERAPY INITIAL EVALUATION ADULT - FINE MOTOR COORDINATION TRAINING, OT EVAL
Goal: Pt will independently manipulate buttons/zippers/fasteners on shirts/pants in 4 weeks  GOAL: Pt will be able to take LVAD off/on batteries

## 2021-06-28 NOTE — PHYSICAL THERAPY INITIAL EVALUATION ADULT - TRANSFER SAFETY CONCERNS NOTED: SIT/STAND, REHAB EVAL
decreased step length/inability to maintain weight-bearing restrictions w/o assist/decreased weight-shifting ability

## 2021-06-28 NOTE — PROGRESS NOTE ADULT - PROBLEM SELECTOR PLAN 2
- Increased speed to 9000 RPM to better unload ventricle. Would repeat TTE when extubated  - remains euvolemic/dry on exam today. Recommend giving alblumin  - c/w heparin with target of lower PTT goal at this time. Eventual resumption of warfarin   - Will plan to hold aspirin indefinitely given GIB  - Continue to monitor LDH daily. - Current speed  9000 RPM to better unload ventricle. Will repeat TTE tomorrow  - remains euvolemic/dry on exam today.  - c/w heparin with target of lower PTT goal at this time. Eventual resumption of warfarin   - Will plan to hold aspirin indefinitely given GIB  - Continue to monitor LDH daily.

## 2021-06-28 NOTE — PROGRESS NOTE ADULT - ASSESSMENT
64 YO M with significant PMHx S/P CABG complicated by respiratory failure. Now S/P # 8 DCT Tracheostomy POD #3

## 2021-06-28 NOTE — OCCUPATIONAL THERAPY INITIAL EVALUATION ADULT - PLANNED THERAPY INTERVENTIONS, OT EVAL
ADL retraining/balance training/bed mobility training/cognitive, visual perceptual/fine motor coordination training/transfer training

## 2021-06-28 NOTE — PROGRESS NOTE ADULT - ASSESSMENT
imp/rx:  Possible aspiration pneumonitis at time of endoscopic procedure  Completed a course of therapy with Zosyn      Recent course complicated by C.diff diarrhea    Abdomen somewhat distended- but soft  Agree with Vanco oral 500mg qid  Continue IV flagyl 500mg q 8hr  No BMs overnight- stool output improving    Leukocytosis and CXR atelectasis vs pneumonia  sputum with Citrobacter and Klebsiella  started on Cefepime on 6/25  will plan to treat for VAP x 5 - 7 days    Morris Prater MD  [phone]  After 5pm/weekends [phone]

## 2021-06-29 NOTE — PROGRESS NOTE ADULT - SUBJECTIVE AND OBJECTIVE BOX
INFECTIOUS DISEASES FOLLOW UP-- Anitra Prater  [phone]    This is a follow up note for this  66yMale with  Anemia, aspiration pneumonia, C.diff colitis  recurrent fevers and blood culture sent 6/26 growing gram + cocci        ROS:  CONSTITUTIONAL: sleepy trach/vent    Allergies    No Known Allergies    Intolerances        ANTIBIOTICS/RELEVANT:  antimicrobials  metroNIDAZOLE  IVPB 500 milliGRAM(s) IV Intermittent every 8 hours  vancomycin    Solution 500 milliGRAM(s) Oral every 6 hours  vancomycin  IVPB 1000 milliGRAM(s) IV Intermittent every 12 hours    immunologic:    OTHER:  acetaminophen    Suspension .. 650 milliGRAM(s) Oral every 6 hours PRN  aMIOdarone    Tablet 200 milliGRAM(s) Oral daily  chlorhexidine 0.12% Liquid 15 milliLiter(s) Oral Mucosa every 12 hours  chlorhexidine 2% Cloths 1 Application(s) Topical <User Schedule>  clonazePAM  Tablet 0.5 milliGRAM(s) Oral every 8 hours  dexMEDEtomidine Infusion 0.5 MICROgram(s)/kG/Hr IV Continuous <Continuous>  heparin  Infusion 400 Unit(s)/Hr IV Continuous <Continuous>  insulin lispro (ADMELOG) corrective regimen sliding scale   SubCutaneous every 6 hours  sodium chloride 0.9%. 1000 milliLiter(s) IV Continuous <Continuous>      Objective:  Vital Signs Last 24 Hrs  T(C): 36.3 (29 Jun 2021 16:00), Max: 38.6 (28 Jun 2021 20:00)  T(F): 97.3 (29 Jun 2021 16:00), Max: 101.5 (28 Jun 2021 20:00)  HR: 72 (29 Jun 2021 17:00) (64 - 92)  BP: --  BP(mean): --  RR: 36 (29 Jun 2021 17:00) (17 - 42)  SpO2: 100% (29 Jun 2021 17:00) (95% - 100%)    PHYSICAL EXAM:  Constitutional:no acute distress, febrile  Eyes:ALONSO, EOMI  Ear/Nose/Throat: no oral lesions, trach site no bleeding	  Respiratory: audible bilat but coarse and decreased at bases bilat  Cardiovascular: VAD sounds  Gastrointestinal:soft, (+) BS, no tenderness  driveline exit site dressing CDI  Extremities:no e/e/c  No Lymphadenopathy  IV sites not inflammed.    LABS:                        8.1    11.92 )-----------( 353      ( 29 Jun 2021 14:05 )             26.6     06-29    136  |  101  |  14  ----------------------------<  189<H>  3.3<L>   |  21<L>  |  0.70    Ca    8.7      29 Jun 2021 00:51  Phos  2.5     06-29  Mg     1.8     06-29    TPro  7.0  /  Alb  3.1<L>  /  TBili  0.3  /  DBili  x   /  AST  21  /  ALT  29  /  AlkPhos  181<H>  06-29    PTT - ( 29 Jun 2021 05:42 )  PTT:53.7 sec      MICROBIOLOGY:        Culture - Blood (06.26.21 @ 06:53)    Gram Stain:   Growth in anaerobic bottle: Gram Positive Cocci in Clusters    Specimen Source: .Blood Blood-Peripheral    Culture Results:   Growth in anaerobic bottle: Gram Positive Cocci in Clusters        RECENT CULTURES:  06-26 @ 06:53  .Blood Blood-Peripheral  --  --  --    No growth to date.  --  06-23 @ 22:49  .Blood Blood  --  --  --    No Growth Final  --  06-23 @ 12:38  .Sputum Sputum trap  Citrobacter freundii  Klebsiella pneumoniae  Citrobacter freundii  CLAU    Numerous Citrobacter freundii  Moderate Klebsiella pneumoniae  Normal Respiratory Bria present  --      RADIOLOGY & ADDITIONAL STUDIES:    < from: Xray Chest 1 View- PORTABLE-Urgent (Xray Chest 1 View- PORTABLE-Urgent .) (06.29.21 @ 10:35) >    The heart is slightly enlarged. Small left pleural effusion. Left lower lobe pneumonia. Platelike atelectasis right lower lobe. NG tube is in the stomach and in good position. All other life supporting devices are in good position and unchanged when compared to previous study done the same date at 2:55 AM.    < end of copied text >

## 2021-06-29 NOTE — PROGRESS NOTE ADULT - SUBJECTIVE AND OBJECTIVE BOX
RICKY HAMPTON  MRN-[id number]  Patient is a 66y old  Male who presents with a chief complaint of Anemia, Supratherapeutic INR, Dark Stools (2021 20:45)      HPI:  65M PMH ACC/AHA stage D HF due to NICM HM2 LVAD , TV annuloplasty ring 17 as destination therapy due to severe peripheral artery disease with significant stenosis  SIADH, Depression, CKD-3 with hyperkalemia, past E. coli UTIs, driveline drainage (21) and COVID-19 (back in 2020)  He was recently seen in clinic where he complained of abdominal pain and dark stools w constipation back in May. He presents to [facility] ER today weakness and fatigue, moderate and + Black stools for three days, on coumadin secondary to warfarin use in the setting of an LVAD. Patient has required transfusions for GIB in the past. Mostly recently back in 2021 pt had anemia with dark stools. No interventions was done at that time. However Last Endoscopy was done in 2020 (negative). Today labs show patient is anemic with H/H of 4.5/16.3,. INR is 8.84 MAP in the 90s, Temp 35.1. He denies any chest pain, shortness of breath, dizziness, abd pain, nausea or vomiting.       (2021 16:57)      Surgery/Hospital Course:   admit for melena w/ anemia, INR 8.84   6/15 Capsul study (+) for small bowel bleed, balloon endoscopy (old blood in prox ileum); post EGD - septic w/ L opacity, re-intubated for concern for aspiration, TTE (Mod MR, decrease biV w/ interventricular septum boweing towards R)   bronch    TC    CT C/A/P: Fluid filled colon which may be 2/2 rapid transit. Small bilateral pleffs with associates. Compressive atelectasis New ISABELLE & LLL  parenchymal opacities, suspicious for pneumonia. 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    #8 Ricky trach at bedside    CPAP trials     Today:  No acute events     ICU Vital Signs Last 24 Hrs  T(C): 37.1 (2021 04:00), Max: 38.6 (2021 20:00)  T(F): 98.8 (2021 04:00), Max: 101.5 (2021 20:00)  HR: 72 (2021 06:00) (64 - 129)  BP: --  BP(mean): --  ABP: 94/57 (2021 06:00) (74/52 - 153/85)  ABP(mean): 69 (2021 06:00) (59 - 113)  RR: 21 (2021 06:00) (15 - 38)  SpO2: 100% (2021 06:00) (95% - 100%)      Physical Exam:  Gen:  intubated   CNS: sedated 	  Neck: no JVD  RES : clear , no wheezing              CVS: Regular  rhythm. Normal S1/S2  Abd: Soft, non-distended. Bowel sounds present.  Skin: No rash.  Ext:  no edema    ============================I/O===========================   I&O's Detail    2021 07:01  -  2021 07:00  --------------------------------------------------------  IN:    Dexmedetomidine: 208 mL    Enteral Tube Flush: 300 mL    Heparin: 287.5 mL    IV PiggyBack: 600 mL    Miscellaneous Tube Feedin mL    sodium chloride 0.9%: 240 mL  Total IN: 2655.5 mL    OUT:    Emesis (mL): 150 mL    Indwelling Catheter - Urethral (mL): 1405 mL  Total OUT: 1555 mL    Total NET: 1100.5 mL        ============================ LABS =========================                        7.0    15.57 )-----------( 358      ( 2021 00:51 )             23.8         136  |  101  |  14  ----------------------------<  189<H>  3.3<L>   |  21<L>  |  0.70    Ca    8.7      2021 00:51  Phos  2.5       Mg     1.8         TPro  7.0  /  Alb  3.1<L>  /  TBili  0.3  /  DBili  x   /  AST  21  /  ALT    /  AlkPhos  181<H>      LIVER FUNCTIONS - ( 2021 00:51 )  Alb: 3.1 g/dL / Pro: 7.0 g/dL / ALK PHOS: 181 U/L / ALT: 29 U/L / AST: 21 U/L / GGT: x           PTT - ( 2021 05:42 )  PTT:53.7 sec  ABG - ( 2021 00:25 )  pH, Arterial: 7.40  pH, Blood: x     /  pCO2: 42    /  pO2: 141   / HCO3: 26    / Base Excess: 1.2   /  SaO2: 99                  ======================Micro/Rad/Cardio=================  Culture: Reviewed   CXR: Reviewed  Echo:Reviewed  ======================================================  PAST MEDICAL & SURGICAL HISTORY:  CHF (congestive heart failure)    CAD (coronary artery disease)    Depression    Pleural effusion    History of 2019 novel coronavirus disease (COVID-19)  2020    Hemorrhoids    Bleeding hemorrhoids    Peripheral arterial disease    Claudication    BPH with urinary obstruction    ACC/AHA stage D systolic heart failure    Anticoagulation goal of INR 2.0 to 2.5    Falls    Clavicle fracture    CKD (chronic kidney disease), stage III    Iron deficiency anemia    H/O epistaxis    Vertigo    GI bleed    S/P TVR (tricuspid valve repair)    S/P ventricular assist device    S/P endoscopy      ====================ASSESSMENT ==============  Stage D Nonischemic Cardiomyopathy, Status Post HM2 on 2017   Recent driveline infection on 2021   Cardiogenic shock  Hemodynamic instability   Hypovolemic shock  Acute hypoxemic respiratory failure  Aspiration pneumonia  Septic shock  GI bleed , Status Post Enteroscopy   Anemia, in setting of melena   Chronic Kidney Disease  Leukocytosis  Coagulopathy   Stress hyperglycemia   C.diff positive on    Fevers    Plan:  ====================== NEUROLOGY=====================  Sedated with IV Precedex to maintain vent synchrony   C/w Clonazepam for anxiety   Tylenol PRN for fevers     acetaminophen    Suspension .. 650 milliGRAM(s) Oral every 6 hours PRN Temp greater or equal to 38C (100.4F)  clonazePAM  Tablet 0.5 milliGRAM(s) Oral every 8 hours  dexMEDEtomidine Infusion 0.5 MICROgram(s)/kG/Hr (9.81 mL/Hr) IV Continuous <Continuous>    ==================== RESPIRATORY======================  Acute hypoxemic respiratory failure s/p trach #8 shiley on     Respiratory status required intermittent full ventilatory support, close monitoring of respiratory rate and breathing pattern, the following of ABG’s with A-line monitoring, continuous pulse oximetry monitoring     Mechanical Ventilation:  Mode: AC/ CMV (Assist Control/ Continuous Mandatory Ventilation)  RR (machine): 12  TV (machine): 450  FiO2: 40  PEEP: 5  ITime: 1  MAP: 12  PIP: 25      ====================CARDIOVASCULAR==================  Stage D NICM, S/P HM2 on 2017; recent driveline infection on 2021; HM2 settings: 9000 rpm, flow 4.0  TTE 6/15: severe global LV systolic dysfunction with HM2 in place, decreased RV systolic function, interventricular septums bows slightly to right, otherwise grossly similar to prior.   Continue invasive hemodynamic monitoring   Rate control with Amiodarone     aMIOdarone    Tablet 200 milliGRAM(s) Oral daily    ===================HEMATOLOGIC/ONC ===================  Acute blood loss anemia, monitor H&H/Plts    Continue AC therapy with IV Heparin for HM2 circuit and VTE prophylaxis      heparin  Infusion 400 Unit(s)/Hr (12.5 mL/Hr) IV Continuous <Continuous>    ===================== RENAL =========================  Continue to monitor I/Os, BUN/Creatinine, and urine output.   Goal net negative fluid balance. Replete lytes PRN. Keep K> 4 and Mg >2.     ==================== GASTROINTESTINAL===================  Tolerating tube feeds, Vital AF @ 60cc/hr   +C.diff on , with enteral feed intolerance  CT C/A/P: fluid filled colon which may be 2/2 rapid transit  Reglan for gut motility     GI prophylaxis, pantoprazole  Injectable 40 milliGRAM(s) IV Push every 12 hours  sodium chloride 0.9%. 1000 milliLiter(s) (10 mL/Hr) IV Continuous <Continuous>  metoclopramide Injectable 10 milliGRAM(s) IV Push every 8 hours    =======================    ENDOCRINE  =====================  Stress hyperglycemia, continue glucose control with admelog sliding scale     insulin lispro (ADMELOG) corrective regimen sliding scale   SubCutaneous every 6 hours    ========================INFECTIOUS DISEASE================  Low grade fever of 100.4F, WBC WBC rising 11.27->12.87-> 18.07  Continue trending WBC and monitoring fever curve   +C.diff on  c/w vancomycin solution and flagyl   SCx on  with +klebsiella pneumoaniae/citrobacter, c/w cefepime   IVPB Vancomycin for perioperative coverage     cefepime   IVPB 1000 milliGRAM(s) IV Intermittent every 8 hours  metroNIDAZOLE  IVPB 500 milliGRAM(s) IV Intermittent every 8 hours  vancomycin    Solution 500 milliGRAM(s) Oral every 6 hours  vancomycin  IVPB 1000 milliGRAM(s) IV Intermittent every 12 hours      I have spent 35 minutes providing acute care for this critically ill patient     Patient requires continuous monitoring with bedside rhythm monitoring, pulse ox monitoring, and intermittent blood gas analysis. Care plan discussed with ICU care team. Patient remained critical and at risk for life threatening decompensation.     By signing my name below, I, Agnieszka Cherry, attest that this documentation has been prepared under the direction and in the presence of Kota Thomas MD   Electronically signed: Cesia Shaffer, 21 @ 07:30    I, Kota Thomas, personally performed the services described in this documentation. all medical record entries made by the luisibmel were at my direction and in my presence. I have reviewed the chart and agree that the record reflects my personal performance and is accurate and complete  Electronically signed: Kota Thomas MD        RICKY HAMPTON  MRN-[id number]  Patient is a 66y old  Male who presents with a chief complaint of Anemia, Supratherapeutic INR, Dark Stools (2021 20:45)      HPI:  65M PMH ACC/AHA stage D HF due to NICM HM2 LVAD , TV annuloplasty ring 17 as destination therapy due to severe peripheral artery disease with significant stenosis  SIADH, Depression, CKD-3 with hyperkalemia, past E. coli UTIs, driveline drainage (21) and COVID-19 (back in 2020)  He was recently seen in clinic where he complained of abdominal pain and dark stools w constipation back in May. He presents to [facility] ER today weakness and fatigue, moderate and + Black stools for three days, on coumadin secondary to warfarin use in the setting of an LVAD. Patient has required transfusions for GIB in the past. Mostly recently back in 2021 pt had anemia with dark stools. No interventions was done at that time. However Last Endoscopy was done in 2020 (negative). Today labs show patient is anemic with H/H of 4.5/16.3,. INR is 8.84 MAP in the 90s, Temp 35.1. He denies any chest pain, shortness of breath, dizziness, abd pain, nausea or vomiting.       (2021 16:57)      Surgery/Hospital Course:   admit for melena w/ anemia, INR 8.84   6/15 Capsul study (+) for small bowel bleed, balloon endoscopy (old blood in prox ileum); post EGD - septic w/ L opacity, re-intubated for concern for aspiration, TTE (Mod MR, decrease biV w/ interventricular septum boweing towards R)   bronch    TC    CT C/A/P: Fluid filled colon which may be 2/2 rapid transit. Small bilateral pleffs with associates. Compressive atelectasis New ISABELLE & LLL  parenchymal opacities, suspicious for pneumonia. 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    #8 Ricky trach at bedside    CPAP trials     Today:  Febrile overnight, tmax 101.5F with rising WBC 14.05->15.57, while on broad spectrum coverage, BCx  +GPC, will send repeat cultures today. Continue TC trials as tolerated with suctioning prn. Will get repeat echo today, will f/u.     ICU Vital Signs Last 24 Hrs  T(C): 37.1 (2021 04:00), Max: 38.6 (2021 20:00)  T(F): 98.8 (2021 04:00), Max: 101.5 (2021 20:00)  HR: 72 (2021 06:00) (64 - 129)  BP: --  BP(mean): --  ABP: 94/57 (2021 06:00) (74/52 - 153/85)  ABP(mean): 69 (2021 06:00) (59 - 113)  RR: 21 (2021 06:00) (15 - 38)  SpO2: 100% (2021 06:00) (95% - 100%)      Physical Exam:  Gen:  intubated   CNS: sedated 	  Neck: no JVD  RES : clear , no wheezing              CVS: Regular  rhythm. Normal S1/S2  Abd: Soft, non-distended. Bowel sounds present.  Skin: No rash.  Ext:  no edema    ============================I/O===========================   I&O's Detail    2021 07:01  -  2021 07:00  --------------------------------------------------------  IN:    Dexmedetomidine: 208 mL    Enteral Tube Flush: 300 mL    Heparin: 287.5 mL    IV PiggyBack: 600 mL    Miscellaneous Tube Feedin mL    sodium chloride 0.9%: 240 mL  Total IN: 2655.5 mL    OUT:    Emesis (mL): 150 mL    Indwelling Catheter - Urethral (mL): 1405 mL  Total OUT: 1555 mL    Total NET: 1100.5 mL        ============================ LABS =========================                        7.0    15.57 )-----------( 358      ( 2021 00:51 )             23.8         136  |  101  |  14  ----------------------------<  189<H>  3.3<L>   |  21<L>  |  0.70    Ca    8.7      2021 00:51  Phos  2.5       Mg     1.8         TPro  7.0  /  Alb  3.1<L>  /  TBili  0.3  /  DBili  x   /  AST  21  /  ALT    /  AlkPhos  181<H>      LIVER FUNCTIONS - ( 2021 00:51 )  Alb: 3.1 g/dL / Pro: 7.0 g/dL / ALK PHOS: 181 U/L / ALT: 29 U/L / AST: 21 U/L / GGT: x           PTT - ( 2021 05:42 )  PTT:53.7 sec  ABG - ( 2021 00:25 )  pH, Arterial: 7.40  pH, Blood: x     /  pCO2: 42    /  pO2: 141   / HCO3: 26    / Base Excess: 1.2   /  SaO2: 99                  ======================Micro/Rad/Cardio=================  Culture: Reviewed   CXR: Reviewed  Echo:Reviewed  ======================================================  PAST MEDICAL & SURGICAL HISTORY:  CHF (congestive heart failure)    CAD (coronary artery disease)    Depression    Pleural effusion    History of 2019 novel coronavirus disease (COVID-19)  2020    Hemorrhoids    Bleeding hemorrhoids    Peripheral arterial disease    Claudication    BPH with urinary obstruction    ACC/AHA stage D systolic heart failure    Anticoagulation goal of INR 2.0 to 2.5    Falls    Clavicle fracture    CKD (chronic kidney disease), stage III    Iron deficiency anemia    H/O epistaxis    Vertigo    GI bleed    S/P TVR (tricuspid valve repair)    S/P ventricular assist device    S/P endoscopy      ====================ASSESSMENT ==============  Stage D Nonischemic Cardiomyopathy, Status Post HM2 on 2017   Recent driveline infection on 2021   Cardiogenic shock  Hemodynamic instability   Hypovolemic shock  Acute hypoxemic respiratory failure  Aspiration pneumonia  Septic shock  GI bleed , Status Post Enteroscopy   Anemia, in setting of melena   Chronic Kidney Disease  Leukocytosis  Coagulopathy   Stress hyperglycemia   C.diff positive on    Fevers    Plan:  ====================== NEUROLOGY=====================  Sedated with IV Precedex to maintain vent synchrony   C/w Clonazepam for anxiety   Tylenol PRN for fevers     acetaminophen    Suspension .. 650 milliGRAM(s) Oral every 6 hours PRN Temp greater or equal to 38C (100.4F)  clonazePAM  Tablet 0.5 milliGRAM(s) Oral every 8 hours  dexMEDEtomidine Infusion 0.5 MICROgram(s)/kG/Hr (9.81 mL/Hr) IV Continuous <Continuous>    ==================== RESPIRATORY======================  Acute hypoxemic respiratory failure s/p trach #8 shiley on     Continue TC trials as tolerated with suctioning prn  Respiratory status required intermittent full ventilatory support, close monitoring of respiratory rate and breathing pattern, the following of ABG’s with A-line monitoring, continuous pulse oximetry monitoring     Mechanical Ventilation:  Mode: AC/ CMV (Assist Control/ Continuous Mandatory Ventilation)  RR (machine): 12  TV (machine): 450  FiO2: 40  PEEP: 5  ITime: 1  MAP: 12  PIP: 25      ====================CARDIOVASCULAR==================  Stage D NICM, S/P HM2 on 2017; recent driveline infection on 2021; HM2 settings: 9000 rpm, flow 5.0  TTE 6/15: severe global LV systolic dysfunction with HM2 in place, decreased RV systolic function, interventricular septums bows slightly to right, otherwise grossly similar to prior.   Continue invasive hemodynamic monitoring   Rate control with Amiodarone   For repeat echo today, will f/u     aMIOdarone    Tablet 200 milliGRAM(s) Oral daily    ===================HEMATOLOGIC/ONC ===================  Acute blood loss anemia, monitor H&H/Plts    Continue AC therapy with IV Heparin for HM2 circuit and VTE prophylaxis      heparin  Infusion 400 Unit(s)/Hr (12.5 mL/Hr) IV Continuous <Continuous>    ===================== RENAL =========================  Continue to monitor I/Os, BUN/Creatinine, and urine output.   Goal net negative fluid balance. Replete lytes PRN. Keep K> 4 and Mg >2.     ==================== GASTROINTESTINAL===================  Tolerating tube feeds, Vital AF @ 60cc/hr   +C.diff on , with enteral feed intolerance  CT C/A/P: fluid filled colon which may be 2/2 rapid transit  Reglan for gut motility     GI prophylaxis, pantoprazole  Injectable 40 milliGRAM(s) IV Push every 12 hours  sodium chloride 0.9%. 1000 milliLiter(s) (10 mL/Hr) IV Continuous <Continuous>  metoclopramide Injectable 10 milliGRAM(s) IV Push every 8 hours    =======================    ENDOCRINE  =====================  Stress hyperglycemia, continue glucose control with admelog sliding scale     insulin lispro (ADMELOG) corrective regimen sliding scale   SubCutaneous every 6 hours    ========================INFECTIOUS DISEASE================  Febrile overnight, tmax 101.5F with rising WBC 14.05->15.57, while on broad spectrum coverage, BCx  +GPC, will send repeat cultures today.   Continue trending WBC and monitoring fever curve   +C.diff on  c/w vancomycin solution and flagyl   SCx on  with +klebsiella pneumoaniae/citrobacter, c/w cefepime   IVPB Vancomycin for perioperative coverage     cefepime   IVPB 1000 milliGRAM(s) IV Intermittent every 8 hours  metroNIDAZOLE  IVPB 500 milliGRAM(s) IV Intermittent every 8 hours  vancomycin    Solution 500 milliGRAM(s) Oral every 6 hours  vancomycin  IVPB 1000 milliGRAM(s) IV Intermittent every 12 hours      I have spent 35 minutes providing acute care for this critically ill patient     Patient requires continuous monitoring with bedside rhythm monitoring, pulse ox monitoring, and intermittent blood gas analysis. Care plan discussed with ICU care team. Patient remained critical and at risk for life threatening decompensation.     By signing my name below, I, Agnieszka Cherry, attest that this documentation has been prepared under the direction and in the presence of Kota Thomas MD   Electronically signed: Romel Shaffer, - @ 07:30    I, Kota Thomas, personally performed the services described in this documentation. all medical record entries made by the romel were at my direction and in my presence. I have reviewed the chart and agree that the record reflects my personal performance and is accurate and complete  Electronically signed: Kota Thomas MD

## 2021-06-29 NOTE — PROGRESS NOTE ADULT - ASSESSMENT
Assessment and Recommendation:   · Assessment	  Assessment and recommendation :  Recurrent Acute hypoxic respiratory Failure S/P tracheostomy on full ventilatory support   Acute left lower lobe pneumonia  possible Aspiration pneumonia   Diarrhea +ve for C-diff. colitis on PO vancomycin and IV Flagyl   recurrent fever start on cefepime , LLL pneumonia   CT scan of abdomen negative for toxic megacolon    Non ischemic cardiomyopathy continue ACE inhibitor and B-Blockers   Septic shock and cardiogenic shock   Stage D systolic heart failure S/P LVAD HM2   MH2 LVAD  with  TV Annuloplasty  Severe peripheral vascular disease   Anion gap metabolic acidosis  severe hyperglycemia on insulin coverage    On  Amiodarone and IV heparin   critical care polyneuropathy   Anemia of Acute blood Loss   S/P Small bowel bleeding   S/P blood and FFP transfusion   Chronic kidney disease stage III  Continue NGT feeding   GI prophylaxis  discuss with TCV surgeon   critical care time spend is 35 minutes

## 2021-06-29 NOTE — PROGRESS NOTE ADULT - SUBJECTIVE AND OBJECTIVE BOX
RICKY JOINT  MRN#: [id number]  Subjective:  Pulmonary progress  : recurrent Acute hypoxic respiratory Failure ,aspiration pneumonia, NICM  , chart reviewed and H/O obtained radiological and Laboratory study reviewed patient Examined     65M PMH ACC/AHA stage D HF due to NICM HM2 LVAD , TV annuloplasty ring 17 as destination therapy due to severe peripheral artery disease with significant stenosis  SIADH, Depression, CKD-3 with hyperkalemia, past E. coli UTIs, driveline drainage (21) and COVID-19 (back in 2020)  He was recently seen in clinic where he complained of abdominal pain and dark stools w constipation back in May. He presents to [facility] ER today weakness and fatigue, moderate and + Black stools for three days, on coumadin secondary to warfarin use in the setting of an LVAD. Patient has required transfusions for GIB in the past. Mostly recently back in 2021 pt had anemia with dark stools. No interventions was done at that time. However Last Endoscopy was done in 2020 (negative). Today labs show patient is anemic with H/H of 4.5/16.3,. INR is 8.84 MAP in the 90s, Temp 35.1. He denies any chest pain, shortness of breath, dizziness, abd pain, nausea or vomiting. found to have  rectal bleeding underwent endoscopy ,old blood in the proximal ileum ,  develop sepsis with LL opacity given Antibiotics , Extubated , reintubated , Bronchoscopy on Zosyn for LL pneumonia  and Amiodarone S/P TV Annuloplasty , patient remain intubated on full ventilatory support .S/P multiple units of blood transfusion , remain on full ventilatory support on Precedex and propofol , new central IJ line , diarrhea C diff. +ve on po vancomycin and IV Flagyl,  mildly distended belly , fever start on cefepime 2gm q 8 hrs S/P tracheostomy .  new RT Subclavian central line continue on contact  isolation ,still diarrhea on C-diff antibiotics ENT follow up appreciated , trial of C-PAP as tolerated , T max 101.5 over night , copious secretion from trach. site chest x ray left lower lobe pneumonia          (2021 16:57)    PAST MEDICAL & SURGICAL HISTORY:  CHF (congestive heart failure)    CAD (coronary artery disease)  Depression    Pleural effusion    History of 2019 novel coronavirus disease (COVID-19)  2020    Hemorrhoids    Bleeding hemorrhoids    Peripheral arterial disease    Claudication    BPH with urinary obstruction    ACC/AHA stage D systolic heart failure  Anticoagulation goal of INR 2.0 to 2.5    Falls    Clavicle fracture    CKD (chronic kidney disease), stage III    Iron deficiency anemia    H/O epistaxis    Vertigo    GI bleed    S/P TVR (tricuspid valve repair)    S/P ventricular assist device    S/P endoscopy    OBJECTIVE:  ICU Vital Signs Last 24 Hrs  T(C): 38.2 (2021 10:00), Max: 38.5 (2021 12:00)  T(F): 100.8 (2021 10:00), Max: 101.3 (2021 12:00)  HR: 65 (2021 10:00) (61 - 69)  BP: --  BP(mean): --  ABP: 105/67 (2021 10:00) (90/54 - 113/64)  ABP(mean): 77 (2021 10:00) (63 - 77)  RR: 20 (2021 10:00) (19 - 35)  SpO2: 99% (2021 10:00) (96% - 100%)       @ :  -   @ 07:00  --------------------------------------------------------  IN: 2693.9 mL / OUT: 1415 mL / NET: 1278.9 mL     @ 07: @ 10:49  --------------------------------------------------------  IN: 420.8 mL / OUT: 115 mL / NET: 305.8 mL  PHYSICAL EXAM:Daily   Elderly male S/P tracheostomy  sedated  on full ventilatory support /40%20/5cm PEEP  0n vasopressin , sedated on propofol   Daily Weight in k.4 (2021 00:00)  HEENT:     + NCAT  + EOMI  - Conjuctival edema   - Icterus   - Thrush   - ETT  + NGT/OGT  Neck:         + FROM   RT SC line  JVD     - Nodes     - Masses    + Mid-line trachea + Tracheostomy  Chest: normal findings  Lungs:          + CTA   + Rhonchi    - Rales    - Wheezing + Decreased  LT BS   - Dullness R L  Cardiac:       + S1 + S2    + RRR   - Irregular   - S3  - S4    - Murmurs   - Rub   - Hamman’s sign   Abdomen:    + BS     + Soft    + Non-tender     - Distended    - Organomegaly  - PEG  Extremities:   - Cyanosis U/L   - Clubbing  U/L  + LE/UE Edema   + Capillary refill    + Pulses   Neuro:        - Awake   -  Alert   - Confused   - Lethargic   + Sedated  + Generalized Weakness  Skin:        - Rashes    - Erythema   + Normal incisions   + IV sites intact          HOSPITAL MEDICATIONS: All mediciations reviewed and analyzed  MEDICATIONS  (STANDING):  aMIOdarone    Tablet 200 milliGRAM(s) Oral daily  chlorhexidine 0.12% Liquid 15 milliLiter(s) Oral Mucosa every 12 hours  chlorhexidine 2% Cloths 1 Application(s) Topical <User Schedule>  dexMEDEtomidine Infusion 0.5 MICROgram(s)/kG/Hr (9.81 mL/Hr) IV Continuous <Continuous>  dextrose 50% Injectable 50 milliLiter(s) IV Push every 15 minutes  heparin  Infusion 400 Unit(s)/Hr (12.5 mL/Hr) IV Continuous <Continuous>  HYDROmorphone  Injectable 0.5 milliGRAM(s) IV Push once  insulin lispro (ADMELOG) corrective regimen sliding scale   SubCutaneous every 6 hours  pantoprazole  Injectable 40 milliGRAM(s) IV Push every 12 hours  piperacillin/tazobactam IVPB.. 3.375 Gram(s) IV Intermittent every 8 hours  propofol Infusion 20 MICROgram(s)/kG/Min (9.42 mL/Hr) IV Continuous <Continuous>  sodium chloride 0.9% lock flush 3 milliLiter(s) IV Push every 8 hours  sodium chloride 0.9%. 1000 milliLiter(s) (10 mL/Hr) IV Continuous <Continuous>    MEDICATIONS  (PRN):  acetaminophen    Suspension .. 650 milliGRAM(s) Oral every 6 hours PRN Temp greater or equal to 38C (100.4F)    LABS: All Lab data reviewed and analyzed                                    7.0    15.57 )-----------( 358      ( 2021 00:51 )             23.8       136  |  101  |  14  ----------------------------<  189<H>  3.3<L>   |  21<L>  |  0.70    Ca    8.7      2021 00:51  Phos  2.5       Mg     1.8         TPro  7.0  /  Alb  3.1<L>  /  TBili  0.3  /  DBili  x   /  AST  21  /  ALT  29  /  AlkPhos  181<H>      Ca    8.9      2021 00:52  Phos  2.7       Mg     1.7         TPro  7.2  /  Alb  3.0<L>  /  TBili  0.3  /  DBili  x   /  AST  31  /  ALT  38  /  AlkPhos  214<H>                     PTT - ( 2021 04:52 )  PTT:45.2 sec LIVER FUNCTIONS - ( 2021 00:42 )  Alb: 3.4 g/dL / Pro: 6.7 g/dL / ALK PHOS: 213 U/L / ALT: 15 U/L / AST: 24 U/L / GGT: x           RADIOLOGY: - Reviewed and analyzed LLL  pneumonia , LVAD HM2, CT scan of abdomen reviewed result noted

## 2021-06-29 NOTE — PROGRESS NOTE ADULT - SUBJECTIVE AND OBJECTIVE BOX
RICKY HAMPTON  MRN-[id number]  Patient is a 66y old  Male who presents with a chief complaint of Anemia, Supratherapeutic INR, Dark Stools (2021 17:51)    HPI:  65M PMH ACC/AHA stage D HF due to NICM HM2 LVAD , TV annuloplasty ring 17 as destination therapy due to severe peripheral artery disease with significant stenosis  SIADH, Depression, CKD-3 with hyperkalemia, past E. coli UTIs, driveline drainage (21) and COVID-19 (back in 2020)  He was recently seen in clinic where he complained of abdominal pain and dark stools w constipation back in May. He presents to [facility] ER today weakness and fatigue, moderate and + Black stools for three days, on coumadin secondary to warfarin use in the setting of an LVAD. Patient has required transfusions for GIB in the past. Mostly recently back in 2021 pt had anemia with dark stools. No interventions was done at that time. However Last Endoscopy was done in 2020 (negative). Today labs show patient is anemic with H/H of 4.5/16.3,. INR is 8.84 MAP in the 90s, Temp 35.1. He denies any chest pain, shortness of breath, dizziness, abd pain, nausea or vomiting.       (2021 16:57)      Surgery/Hospital Course:    Today:    REVIEW OF SYSTEMS:  Gen: No fever  EYES/ENT: No visual changes;  No vertigo or throat pain   NECK: No pain   RES:  No shortness of breath or Cough  CARD: No chest pain   GI: No abdominal pain  : No dysuria  NEURO: No weakness  SKIN: No itching, rashes     ICU Vital Signs Last 24 Hrs  T(C): 37.1 (2021 20:00), Max: 37.3 (2021 00:00)  T(F): 98.8 (2021 20:00), Max: 99.1 (2021 00:00)  HR: 85 (2021 22:00) (64 - 100)  BP: --  BP(mean): --  ABP: 103/62 (2021 22:00) (89/53 - 139/73)  ABP(mean): 77 (2021 22:00) (65 - 99)  RR: 7 (2021 22:00) (7 - 42)  SpO2: 100% (2021 22:00) (95% - 100%)      Physical Exam:  Gen:  Awake, alert   CNS: non focal 	  Neck: no JVD  RES : clear , no wheezing              CVS: Regular  rhythm. Normal S1/S2  Abd: Soft, non-distended. Bowel sounds present.  Skin: No rash.  Ext:  no edema    ============================I/O===========================   I&O's Detail    2021 07:01  -  2021 07:00  --------------------------------------------------------  IN:    Dexmedetomidine: 208 mL    Enteral Tube Flush: 300 mL    Heparin: 300 mL    IV PiggyBack: 600 mL    Miscellaneous Tube Feedin mL    sodium chloride 0.9%: 240 mL  Total IN: 2668 mL    OUT:    Emesis (mL): 150 mL    Indwelling Catheter - Urethral (mL): 1435 mL  Total OUT: 1585 mL    Total NET: 1083 mL      2021 07:01  -  2021 22:43  --------------------------------------------------------  IN:    Dexmedetomidine: 131.3 mL    Enteral Tube Flush: 25 mL    Heparin: 187.5 mL    IV PiggyBack: 450 mL    Miscellaneous Tube Feedin mL    PRBCs (Packed Red Blood Cells): 310 mL    sodium chloride 0.9%: 150 mL  Total IN: 2153.8 mL    OUT:    Indwelling Catheter - Urethral (mL): 855 mL  Total OUT: 855 mL    Total NET: 1298.8 mL        ============================ LABS =========================                        8.1    11.92 )-----------( 353      ( 2021 14:05 )             26.6         136  |  101  |  14  ----------------------------<  189<H>  3.3<L>   |  21<L>  |  0.70    Ca    8.7      2021 00:51  Phos  2.5       Mg     1.8         TPro  7.0  /  Alb  3.1<L>  /  TBili  0.3  /  DBili  x   /  AST  21  /  ALT    /  AlkPhos  181<H>      LIVER FUNCTIONS - ( 2021 00:51 )  Alb: 3.1 g/dL / Pro: 7.0 g/dL / ALK PHOS: 181 U/L / ALT: 29 U/L / AST: 21 U/L / GGT: x           PTT - ( 2021 05:42 )  PTT:53.7 sec  ABG - ( 2021 13:57 )  pH, Arterial: 7.36  pH, Blood: x     /  pCO2: 48    /  pO2: 108   / HCO3: 26    / Base Excess: 1.5   /  SaO2: 98                  ======================Micro/Rad/Cardio=================  Culture: Reviewed   CXR: Reviewed  Echo:Reviewed  ======================================================  PAST MEDICAL & SURGICAL HISTORY:  CHF (congestive heart failure)    CAD (coronary artery disease)    Depression    Pleural effusion    History of 2019 novel coronavirus disease (COVID-19)  2020    Hemorrhoids    Bleeding hemorrhoids    Peripheral arterial disease    Claudication    BPH with urinary obstruction    ACC/AHA stage D systolic heart failure    Anticoagulation goal of INR 2.0 to 2.5    Falls    Clavicle fracture    CKD (chronic kidney disease), stage III    Iron deficiency anemia    H/O epistaxis    Vertigo    GI bleed    S/P TVR (tricuspid valve repair)    S/P ventricular assist device    S/P endoscopy      ====================ASSESSMENT ==============                Plan:  ====================== NEUROLOGY=====================  acetaminophen    Suspension .. 650 milliGRAM(s) Oral every 6 hours PRN Temp greater or equal to 38C (100.4F)  clonazePAM  Tablet 0.5 milliGRAM(s) Oral every 8 hours  dexMEDEtomidine Infusion 0.5 MICROgram(s)/kG/Hr (9.81 mL/Hr) IV Continuous <Continuous>    ==================== RESPIRATORY======================  Mechanical Ventilation:  Mode: Vent off      ====================CARDIOVASCULAR==================  aMIOdarone    Tablet 200 milliGRAM(s) Oral daily    ===================HEMATOLOGIC/ONC ===================  heparin  Infusion 400 Unit(s)/Hr (12.5 mL/Hr) IV Continuous <Continuous>    ===================== RENAL =========================  Continue monitoring urine output    ==================== GASTROINTESTINAL===================  sodium chloride 0.9%. 1000 milliLiter(s) (10 mL/Hr) IV Continuous <Continuous>    =======================    ENDOCRINE  =====================  insulin lispro (ADMELOG) corrective regimen sliding scale   SubCutaneous every 6 hours    ========================INFECTIOUS DISEASE================  metroNIDAZOLE  IVPB 500 milliGRAM(s) IV Intermittent every 8 hours  vancomycin    Solution 500 milliGRAM(s) Oral every 6 hours  vancomycin  IVPB 1000 milliGRAM(s) IV Intermittent every 12 hours      Patient requires continuous monitoring with bedside rhythm monitoring, pulse ox monitoring, and intermittent blood gas analysis. Care plan discussed with ICU care team. Patient remained critical and at risk for life threatening decompensation.  RICKY HAMPTON  MRN-[id number]  Patient is a 66y old  Male who presents with a chief complaint of Anemia, Supratherapeutic INR, Dark Stools (2021 17:51)    HPI:  65M PMH ACC/AHA stage D HF due to NICM HM2 LVAD , TV annuloplasty ring 17 as destination therapy due to severe peripheral artery disease with significant stenosis  SIADH, Depression, CKD-3 with hyperkalemia, past E. coli UTIs, driveline drainage (21) and COVID-19 (back in 2020)  He was recently seen in clinic where he complained of abdominal pain and dark stools w constipation back in May. He presents to [facility] ER today weakness and fatigue, moderate and + Black stools for three days, on coumadin secondary to warfarin use in the setting of an LVAD. Patient has required transfusions for GIB in the past. Mostly recently back in 2021 pt had anemia with dark stools. No interventions was done at that time. However Last Endoscopy was done in 2020 (negative). Today labs show patient is anemic with H/H of 4.5/16.3,. INR is 8.84 MAP in the 90s, Temp 35.1. He denies any chest pain, shortness of breath, dizziness, abd pain, nausea or vomiting.       (2021 16:57)      Surgery/Hospital Course:   admit for melena w/ anemia, INR 8.84   6/15 Capsul study (+) for small bowel bleed, balloon endoscopy (old blood in prox ileum); post EGD - septic w/ L opacity, re-intubated for concern for aspiration, TTE (Mod MR, decrease biV w/ interventricular septum boweing towards R)   bronch    C. Diff (+)   CT C/A/P: Fluid filled colon which may be 2/2 rapid transit. Small bilateral pleffs with associates. Compressive atelectasis New ISABELLE & LLL  parenchymal opacities, suspicious for pneumonia. 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    #8 Ricky trach at bedside    CPAP trials     Today:  - Trach collar since 11AM, put back on full vent support at midnight, will put back on trach collar at 6AM  - Thick secretions from trach, patient is able to cough them up  - On Heparin drip for anticoagulation of LVAD HM2, PTT goal 40-50  - On IV Vanco for GPC on  BC from  (now growing to Staphylococcus lugdenensis, sensitivities pending)  - Repeat BC x2 sent today, will f/u  - On PO Vanco and IV Flagyl for C. Diff infection  - Patient still having loose bowel movements    REVIEW OF SYSTEMS:  Unable to obtain as patient has tracheostomy    ICU Vital Signs Last 24 Hrs  T(C): 37.1 (2021 20:00), Max: 37.3 (2021 00:00)  T(F): 98.8 (2021 20:00), Max: 99.1 (2021 00:00)  HR: 85 (2021 22:00) (64 - 100)  BP: --  BP(mean): --  ABP: 103/62 (2021 22:00) (89/53 - 139/73)  ABP(mean): 77 (2021 22:00) (65 - 99)  RR: 7 (2021 22:00) (7 - 42)  SpO2: 100% (2021 22:00) (95% - 100%)      Physical Exam:  Gen:  Awake, alert, NAD on TC  CNS: Responds to all commands in b/l Upper and lower extremities  RES : Breath sounds coarse worse on left, no wheezing, tracheostomy intact with thick sputum  CVS: LVAD HM2 functioning well, hum auscultated  Abd: Soft, NT, non-distended. Bowel sounds hyperactive with loose stool.  : Landrum intact making good urine.  Skin: No rash.  Ext: Warm and well-perfused    ============================I/O===========================   I&O's Detail    2021 07:01  -  2021 07:00  --------------------------------------------------------  IN:    Dexmedetomidine: 208 mL    Enteral Tube Flush: 300 mL    Heparin: 300 mL    IV PiggyBack: 600 mL    Miscellaneous Tube Feedin mL    sodium chloride 0.9%: 240 mL  Total IN: 2668 mL    OUT:    Emesis (mL): 150 mL    Indwelling Catheter - Urethral (mL): 1435 mL  Total OUT: 1585 mL    Total NET: 1083 mL      2021 07:01  -  2021 22:43  --------------------------------------------------------  IN:    Dexmedetomidine: 131.3 mL    Enteral Tube Flush: 25 mL    Heparin: 187.5 mL    IV PiggyBack: 450 mL    Miscellaneous Tube Feedin mL    PRBCs (Packed Red Blood Cells): 310 mL    sodium chloride 0.9%: 150 mL  Total IN: 2153.8 mL    OUT:    Indwelling Catheter - Urethral (mL): 855 mL  Total OUT: 855 mL    Total NET: 1298.8 mL        ============================ LABS =========================                        8.1    11.92 )-----------( 353      ( 2021 14:05 )             26.6     -    136  |  101  |  14  ----------------------------<  189<H>  3.3<L>   |  21<L>  |  0.70    Ca    8.7      2021 00:51  Phos  2.5       Mg     1.8         TPro  7.0  /  Alb  3.1<L>  /  TBili  0.3  /  DBili  x   /  AST  21  /  ALT    /  AlkPhos  181<H>      LIVER FUNCTIONS - ( 2021 00:51 )  Alb: 3.1 g/dL / Pro: 7.0 g/dL / ALK PHOS: 181 U/L / ALT: 29 U/L / AST: 21 U/L / GGT: x           PTT - ( 2021 05:42 )  PTT:53.7 sec  ABG - ( 2021 13:57 )  pH, Arterial: 7.36  pH, Blood: x     /  pCO2: 48    /  pO2: 108   / HCO3: 26    / Base Excess: 1.5   /  SaO2: 98                  ======================Micro/Rad/Cardio=================  Culture: Reviewed   CXR: Reviewed  Echo:Reviewed  ======================================================  PAST MEDICAL & SURGICAL HISTORY:  CHF (congestive heart failure)    CAD (coronary artery disease)    Depression    Pleural effusion    History of 2019 novel coronavirus disease (COVID-19)  2020    Hemorrhoids    Bleeding hemorrhoids    Peripheral arterial disease    Claudication    BPH with urinary obstruction    ACC/AHA stage D systolic heart failure    Anticoagulation goal of INR 2.0 to 2.5    Falls    Clavicle fracture    CKD (chronic kidney disease), stage III    Iron deficiency anemia    H/O epistaxis    Vertigo    GI bleed    S/P TVR (tricuspid valve repair)    S/P ventricular assist device    S/P endoscopy      ====================ASSESSMENT ==============    65M PMH ACC/AHA stage D HF due to NICM HM2 LVAD , TV annuloplasty ring 17 as destination therapy due to severe peripheral artery disease with significant stenosis  SIADH, Depression, CKD-3 with hyperkalemia, past E. coli UTIs, driveline drainage (21) and COVID-19 (back in 2020). Patient was admitted  for melena with anemia, INR of 8, capsule study performed, and post EGD 6/15 patient was intubated for septic shock 2/2 aspiration pnemonia, and subsequently trached for continued respiratory failure.      Plan:  ====================== NEUROLOGY=====================  Continue to monitor neuro status    acetaminophen    Suspension .. 650 milliGRAM(s) Oral every 6 hours PRN Temp greater or equal to 38C (100.4F)  clonazePAM  Tablet 0.5 milliGRAM(s) Oral every 8 hours  dexMEDEtomidine Infusion 0.5 MICROgram(s)/kG/Hr (9.81 mL/Hr) IV Continuous <Continuous>    ==================== RESPIRATORY======================  Acute septic shock, persistent respiratory failure due to pneumonia, hypoxemia, requiring tracheostomy   s/p Trach #8 Shiley  Trach collar for 12 hours today, plan to rest on vent for 6 hours, then attempt trach collar the following morning  Monitor secretions, ABGs, SpO2  Serial chest X-rays, SC from  growing Klebsiella      ====================CARDIOVASCULAR==================  ACC/AHA stage D HF due to NICM, s/p HM2 LVAD , TV annuloplasty ring 17 as destination therapy  Monitor LVAD parameters, hemodynamics  TTE completed today, f/u results    aMIOdarone    Tablet 200 milliGRAM(s) Oral daily    ===================HEMATOLOGIC/ONC ===================  Continue Heparin drip for LVAD anticoagulation, PTT goal 40-50  Monitor CBC, H/H, coags  Monitor for melena    heparin  Infusion 1250 Unit(s)/Hr (12.5 mL/Hr) IV Continuous <Continuous>    ===================== RENAL =========================  Continue monitoring urine output, I/O's, BUN/Cr    ==================== GASTROINTESTINAL===================  Protonix for GI ppx    sodium chloride 0.9%. 1000 milliLiter(s) (10 mL/Hr) IV Continuous <Continuous>    =======================    ENDOCRINE  =====================  insulin lispro (ADMELOG) corrective regimen sliding scale   SubCutaneous every 6 hours    ========================INFECTIOUS DISEASE================  Klebsiella Pneumonia  Clostridium Difficile infection  Gram Positive bacteremia  Continue IV Vanco, f/u BC from   Continue PO Vanco and IV Flagyl for C. Diff  Monitor stools, fever curve, WBC    metroNIDAZOLE  IVPB 500 milliGRAM(s) IV Intermittent every 8 hours  vancomycin    Solution 500 milliGRAM(s) Oral every 6 hours  vancomycin  IVPB 1000 milliGRAM(s) IV Intermittent every 12 hours      Patient requires continuous monitoring with bedside rhythm monitoring, pulse ox monitoring, and intermittent blood gas analysis. Care plan discussed with ICU care team. Patient remained critical and at risk for life threatening decompensation.

## 2021-06-29 NOTE — PROGRESS NOTE ADULT - SUBJECTIVE AND OBJECTIVE BOX
ENT ISSUE/POD:  S/P #8 DCT Tracheostomy POD # 2    HPI: 64 YO M with significant PMHx, admitted for weakness and fatigue, was found to have CAD S/P CABG on 6/18. ENT called to evaluate for tracheostomy. Pt failed weaning trials and is not a candidate for extubation as per primary team. Now S/P # 8 DCT Tracheostomy POD # 3. Surgicel x1  placed around the stoma in OR.  Doing well on the ventilator. No bleeding noted.             PAST MEDICAL & SURGICAL HISTORY:  CHF (congestive heart failure)    CAD (coronary artery disease)    Depression    Pleural effusion    History of 2019 novel coronavirus disease (COVID-19)  april 2020    Hemorrhoids    Bleeding hemorrhoids    Peripheral arterial disease    Claudication    BPH with urinary obstruction    ACC/AHA stage D systolic heart failure    Anticoagulation goal of INR 2.0 to 2.5    Falls    Clavicle fracture    CKD (chronic kidney disease), stage III    Iron deficiency anemia    H/O epistaxis    Vertigo    GI bleed    S/P TVR (tricuspid valve repair)    S/P ventricular assist device    S/P endoscopy      Allergies    No Known Allergies    Intolerances      MEDICATIONS  (STANDING):  aMIOdarone    Tablet 200 milliGRAM(s) Oral daily  cefepime   IVPB 1000 milliGRAM(s) IV Intermittent every 8 hours  chlorhexidine 0.12% Liquid 15 milliLiter(s) Oral Mucosa every 12 hours  chlorhexidine 2% Cloths 1 Application(s) Topical <User Schedule>  clonazePAM  Tablet 0.5 milliGRAM(s) Oral every 8 hours  dexMEDEtomidine Infusion 0.5 MICROgram(s)/kG/Hr (9.81 mL/Hr) IV Continuous <Continuous>  heparin  Infusion 400 Unit(s)/Hr (12.5 mL/Hr) IV Continuous <Continuous>  insulin lispro (ADMELOG) corrective regimen sliding scale   SubCutaneous every 6 hours  metoclopramide Injectable 10 milliGRAM(s) IV Push every 8 hours  metroNIDAZOLE  IVPB 500 milliGRAM(s) IV Intermittent every 8 hours  pantoprazole  Injectable 40 milliGRAM(s) IV Push every 12 hours  sodium chloride 0.9%. 1000 milliLiter(s) (10 mL/Hr) IV Continuous <Continuous>  vancomycin    Solution 500 milliGRAM(s) Oral every 6 hours  vancomycin  IVPB 1000 milliGRAM(s) IV Intermittent every 12 hours    MEDICATIONS  (PRN):  acetaminophen    Suspension .. 650 milliGRAM(s) Oral every 6 hours PRN Temp greater or equal to 38C (100.4F)      Social History: see consult    Family history: see consult    ROS:   ENT: all negative except as noted in HPI   Pulm: denies SOB, cough, hemoptysis  Neuro: denies numbness/tingling, loss of sensation  Endo: denies heat/cold intolerance, excessive sweating      Vital Signs Last 24 Hrs  T(C): 37 (29 Jun 2021 08:00), Max: 38.6 (28 Jun 2021 20:00)  T(F): 98.6 (29 Jun 2021 08:00), Max: 101.5 (28 Jun 2021 20:00)  HR: 77 (29 Jun 2021 08:00) (64 - 129)  BP: --  BP(mean): --  RR: 26 (29 Jun 2021 08:00) (16 - 38)  SpO2: 100% (29 Jun 2021 08:00) (95% - 100%)                          7.0    15.57 )-----------( 358      ( 29 Jun 2021 00:51 )             23.8    06-29    136  |  101  |  14  ----------------------------<  189<H>  3.3<L>   |  21<L>  |  0.70    Ca    8.7      29 Jun 2021 00:51  Phos  2.5     06-29  Mg     1.8     06-29    TPro  7.0  /  Alb  3.1<L>  /  TBili  0.3  /  DBili  x   /  AST  21  /  ALT  29  /  AlkPhos  181<H>  06-29   PTT - ( 29 Jun 2021 05:42 )  PTT:53.7 sec    PHYSICAL EXAM:  Gen: NAD, On Vent.   Skin: No rashes, bruises, or lesions.  Head: Normocephalic, Atraumatic.  Face: no edema, erythema, or fluctuance. Parotid glands soft without mass.  Eyes: no scleral injection.  Nose: Nares bilaterally patent, no discharge  Mouth: No Stridor / Drooling / Trismus.  Mucosa moist, tongue/uvula midline, oropharynx clear  Neck: # 8 DCT Trach secured with sutures x 4 and Umbilical Tie. Minimal oozing of serosanguinous secretions,.  No bleeding noted. No hematoma/crepitus. Flat, supple, no lymphadenopathy, trachea midline, no masses.  Lymphatic: No lymphadenopathy  Resp: On Vent.   Neuro: Unable to obtain.

## 2021-06-29 NOTE — PROGRESS NOTE ADULT - ASSESSMENT
66 YO M with significant PMHx S/P CABG complicated by respiratory failure. Now S/P # 8 DCT Tracheostomy POD #3.

## 2021-06-29 NOTE — PROGRESS NOTE ADULT - PROBLEM SELECTOR PLAN 2
- Current speed  9000 RPM to better unload ventricle. Will repeat TTE tomorrow  - remains euvolemic/dry on exam today.  - c/w heparin with target of lower PTT goal at this time. Eventual resumption of warfarin   - Will plan to hold aspirin indefinitely given GIB  - Continue to monitor LDH daily. - Current speed  9000 RPM to better unload ventricle. Will repeat TTE tomorrow  - remains euvolemic/dry on exam today.  - c/w heparin with target of lower PTT goal at this time. Eventual resumption of warfarin   - Will plan to hold aspirin indefinitely given GIB  - Continue to monitor LDH daily.  - Potassium 3.6 potassium given rechecking K+ this am

## 2021-06-29 NOTE — PROGRESS NOTE ADULT - ATTENDING COMMENTS
OOB to chair and appears more comfortable, but seems depressed with his circumstance.  Asking to return to bed.   Overnight had fever to 101.5F despite broadening of IV antibiotics. Ashtyno IV added this AM for GPC in recent blood cx (?contaminant).    Repeat blood cultures today.  Would transfuse 1u pRBCs given slow downtrend of Hgb. Recent FOBT negative. LDH stable/improved without suggestion of hemolysis.  VAD interrogation without alarms or events, no changes made.  Continue current care. Trach collar trials, as tolerated, per CTU team.

## 2021-06-29 NOTE — PROGRESS NOTE ADULT - ATTENDING COMMENTS
S/p tracheostomy. Trach in place and patent. Ventilating well. Will continue to follow. Any trach issues please call ENT [phone].

## 2021-06-29 NOTE — PROGRESS NOTE ADULT - ASSESSMENT
imp/rx:  Suspected aspiration event at time of Endoscopic procedure with subsequent development of C.diff diarrhea  Now with increasing leukocytosis and gram + cocci in clusters from blood culture sent 6/26 with organism ID/sensitivity pending  Started IV Vancomycin 100mg q 12hr pending sensitivities  Would change CVC lines as feasible  Send repeat blood cultures  Check Vanco trough prior to the fourth dose    Clostridiodes difficile :  Abdomen somewhat distended- but soft  Agree with Vanco oral 500mg qid  Continue IV flagyl 500mg q 8hr  No BMs overnight- stool output today one small BM    Morris Prater MD  [phone]  After 5pm/weekends [phone]

## 2021-06-29 NOTE — PROGRESS NOTE ADULT - ASSESSMENT
65 YO M with a history of stage D NICM s/p HM2 on 9/2017 as DT (due to severe PAD) with TV ring, prior COVID-19 infection 4/2020, recurrent syncope post LVAD s/p ILR, and chronic abdominal pain with prior negative workup who was admitted with symptomatic anemia with Hgb 4.5 in setting of INR 8.8 without hemodynamic instability. He was transfused and underwent VCE which showed active bleeding in the mid small bowel but subsequent enteroscopy 6/15 did not reveal any active bleeding. He acutely decompensated after procedure with fever/hypertension, low flow alarms, and pulmonary infiltrate with hypoxia requiring intubation from probable aspiration PNA. His LDH is normal and there are no signs of overt LVAD dysfunction on echo though signs of insufficiently unloaded ventricle for which we increased speed on 6/19.    He remains on ventilator although CXR is improving. He also has acute Cdiff colitis and is currently being tx w/ vanco PO and metronidazole. There is concern for worsening VAP. He has been afebrile over the past 24 hours on broadened abx coverage with addition of cefepime. His leukocytosis is improving. He is hemodynamically stable although with low CVP of 2 today and low albumin. His Hgb is stable.  His LDH is stable and he is without s/s of LVAD pump malfunction.

## 2021-06-29 NOTE — PROGRESS NOTE ADULT - SUBJECTIVE AND OBJECTIVE BOX
Subjective: Seen sitting in chair. He is A&O X 3  O/N ? aspirating  TF as his secretions looked creamy        Medications:  acetaminophen    Suspension .. 650 milliGRAM(s) Oral every 6 hours PRN  aMIOdarone    Tablet 200 milliGRAM(s) Oral daily  cefepime   IVPB 1000 milliGRAM(s) IV Intermittent every 8 hours  chlorhexidine 0.12% Liquid 15 milliLiter(s) Oral Mucosa every 12 hours  chlorhexidine 2% Cloths 1 Application(s) Topical <User Schedule>  clonazePAM  Tablet 1 milliGRAM(s) Oral every 8 hours  dexMEDEtomidine Infusion 0.5 MICROgram(s)/kG/Hr IV Continuous <Continuous>  heparin  Infusion 400 Unit(s)/Hr IV Continuous <Continuous>  insulin lispro (ADMELOG) corrective regimen sliding scale   SubCutaneous every 6 hours  metoclopramide Injectable 10 milliGRAM(s) IV Push every 8 hours  metroNIDAZOLE  IVPB 500 milliGRAM(s) IV Intermittent every 8 hours  pantoprazole  Injectable 40 milliGRAM(s) IV Push every 12 hours  propofol Infusion 20 MICROgram(s)/kG/Min IV Continuous <Continuous>  sodium chloride 0.9%. 1000 milliLiter(s) IV Continuous <Continuous>  vancomycin    Solution 500 milliGRAM(s) Oral every 6 hours      ICU Vital Signs Last 24 Hrs  T(C): 37 (2021 08:00), Max: 38.6 (2021 20:00)  T(F): 98.6 (2021 08:00), Max: 101.5 (2021 20:00)  HR: 77 (2021 08:00) (64 - 129)  ABP: 111/68 (2021 08:00) (74/52 - 153/85)  ABP(mean): 84 (2021 08:00) (59 - 113)  RR: 26 (2021 08:00) (16 - 38)  SpO2: 100% (2021 08:00) (95% - 100%)                          7.0    15.57 )-----------( 358      ( 2021 00:51 )             23.8     06-    136  |  101  |  14  ----------------------------<  189<H>  3.3<L>   |  21<L>  |  0.70    Ca    8.7      2021 00:51  Phos  2.5       Mg     1.8         TPro  7.0  /  Alb  3.1<L>  /  TBili  0.3  /  DBili  x   /  AST  21  /  ALT  29  /  AlkPhos  181<H>      PTT - ( 2021 05:42 )  PTT:53.7 sec      LVAD Interrogation:   Heart Mate 2  Speed 9000  Flow 4.3  Power 4.7  PI: 5.1  Replace back up battery in 10 months  No events. No changes made    Daily     Daily Weight in k.4 (2021 00:00)  Weight in k ( @ 00:00)      I&O's Summary    2021 07:  -  2021 07:00  --------------------------------------------------------  IN: 2668 mL / OUT: 1585 mL / NET: 1083 mL    2021 07:  -  2021 08:35  --------------------------------------------------------  IN: 192.4 mL / OUT: 40 mL / NET: 152.4 mL        Tele: SR HR 87    General: No distress. Comfortable.  HEENT: EOM intact. NGT in place with TF running  Neck: Neck supple.  No masses  Chest: Clear to auscultation bilaterally  CV: LVAD hum. No LE edema  Abdomen: Soft, non-distended, non-tender  Skin: Warm peripherally. CVC dressing, driveline dressing is C/D/I  Neurology: Alert. Sensation intact  Psych: MARELY, on vent              Lactate Dehydrogenase, Serum: 307 U/L ()  Lactate Dehydrogenase, Serum: 351 U/L ()  Lactate Dehydrogenase, Serum: 347 U/L ( @ 00:36)  Lactate Dehydrogenase, Serum: 394 U/L ( @ 00:43)  Lactate Dehydrogenase, Serum: 402 U/L ( @ 08:37)

## 2021-06-30 NOTE — PROGRESS NOTE ADULT - SUBJECTIVE AND OBJECTIVE BOX

## 2021-06-30 NOTE — PROGRESS NOTE ADULT - ASSESSMENT
imp/rx:  Suspected aspiration event at time of Endoscopic procedure with subsequent development of C.diff diarrhea  Now with increasing leukocytosis and gram + cocci in clusters from blood culture sent 6/26 with organism ID/sensitivity pending  Started IV Vancomycin  Vanco CLAU=4 is borderline  Will change antibiotics to Cefazolin  Would change CVC lines as feasible   repeat blood cultures 6/29 x2 sets are NGTD      Clostridiodes difficile :  Abdomen somewhat distended- but soft- loose stool noted this afternoon  Continue Vanco oral 500mg qid  Continue IV flagyl 500mg q 8hr      Morris Prater MD  [phone]  After 5pm/weekends [phone]

## 2021-06-30 NOTE — PROGRESS NOTE ADULT - ASSESSMENT
66 YO M with significant PMHx S/P CABG complicated by respiratory failure. Now S/P # 8 DCT Tracheostomy POD #5

## 2021-06-30 NOTE — PROGRESS NOTE ADULT - ASSESSMENT
Assessment and Recommendation:   · Assessment	  Assessment and recommendation :  Recurrent Acute hypoxic respiratory Failure S/P tracheostomy on full ventilatory support alternating with trach. collar   Acute left lower lobe pneumonia  possible Aspiration pneumonia   Diarrhea +ve for C-diff. colitis on PO vancomycin and IV Flagyl   recurrent fever start on cefepime , LLL pneumonia    Non ischemic cardiomyopathy continue ACE inhibitor and B-Blockers   S/P Septic shock and cardiogenic shock   Stage D systolic heart failure S/P LVAD HM2   MH2 LVAD  with  TV Annuloplasty  Severe peripheral vascular disease   S/P Anion gap metabolic acidosis  severe hyperglycemia on insulin coverage    On  Amiodarone and IV heparin   critical care polyneuropathy   Anemia of Acute blood Loss   S/P Small bowel bleeding   S/P blood and FFP transfusion   Chronic kidney disease stage III  Continue NGT feeding   GI prophylaxis  discuss with TCV surgeon   critical care time spend is 35 minutes

## 2021-06-30 NOTE — PROGRESS NOTE ADULT - PROBLEM SELECTOR PLAN 3
- s/p trach 6/25; wean ventilator as tolerates  - sedation vacation and wean as per CTU  - c/w tx VAP w/ cefepime; agree w/ ID input for abxs guidance

## 2021-06-30 NOTE — PROGRESS NOTE ADULT - SUBJECTIVE AND OBJECTIVE BOX
ENT ISSUE/POD:  S/P #8 DCT Tracheostomy POD #5    HPI: 64 YO M with significant PMHx, admitted for weakness and fatigue, was found to have CAD S/P CABG on 6/18. ENT called to evaluate for tracheostomy. Pt failed weaning trials and is not a candidate for extubation as per primary team. Now S/P # 8 DCT Tracheostomy POD #5. Surgicel x1 placed around the stoma in OR. Doing well on the ventilator. No bleeding noted.         PAST MEDICAL & SURGICAL HISTORY:  CHF (congestive heart failure)    CAD (coronary artery disease)    Depression    Pleural effusion    History of 2019 novel coronavirus disease (COVID-19)  april 2020    Hemorrhoids    Bleeding hemorrhoids    Peripheral arterial disease    Claudication    BPH with urinary obstruction    ACC/AHA stage D systolic heart failure    Anticoagulation goal of INR 2.0 to 2.5    Falls    Clavicle fracture    CKD (chronic kidney disease), stage III    Iron deficiency anemia    H/O epistaxis    Vertigo    GI bleed    S/P TVR (tricuspid valve repair)    S/P ventricular assist device    S/P endoscopy      Allergies    No Known Allergies    Intolerances      MEDICATIONS  (STANDING):  aMIOdarone    Tablet 200 milliGRAM(s) Oral daily  chlorhexidine 0.12% Liquid 15 milliLiter(s) Oral Mucosa every 12 hours  chlorhexidine 2% Cloths 1 Application(s) Topical <User Schedule>  clonazePAM  Tablet 0.5 milliGRAM(s) Oral every 8 hours  dexMEDEtomidine Infusion 0.5 MICROgram(s)/kG/Hr (9.81 mL/Hr) IV Continuous <Continuous>  heparin  Infusion 400 Unit(s)/Hr (12.5 mL/Hr) IV Continuous <Continuous>  insulin lispro (ADMELOG) corrective regimen sliding scale   SubCutaneous every 6 hours  metroNIDAZOLE  IVPB 500 milliGRAM(s) IV Intermittent every 8 hours  pantoprazole  Injectable 40 milliGRAM(s) IV Push daily  sodium chloride 0.9%. 1000 milliLiter(s) (10 mL/Hr) IV Continuous <Continuous>  vancomycin    Solution 500 milliGRAM(s) Oral every 6 hours  vancomycin  IVPB 1000 milliGRAM(s) IV Intermittent every 12 hours    MEDICATIONS  (PRN):  acetaminophen    Suspension .. 650 milliGRAM(s) Oral every 6 hours PRN Temp greater or equal to 38C (100.4F)      Social History: see consult    Family history: see consult    ROS:   uto      Vital Signs Last 24 Hrs  T(C): 37.1 (30 Jun 2021 04:00), Max: 37.3 (30 Jun 2021 00:00)  T(F): 98.8 (30 Jun 2021 04:00), Max: 99.1 (30 Jun 2021 00:00)  HR: 80 (30 Jun 2021 07:00) (64 - 100)  BP: --  BP(mean): --  RR: 41 (30 Jun 2021 07:00) (19 - 43)  SpO2: 97% (30 Jun 2021 07:00) (95% - 100%)                          8.6    12.24 )-----------( 385      ( 30 Jun 2021 00:40 )             28.2    06-30    137  |  104  |  11  ----------------------------<  161<H>  4.0   |  22  |  0.57    Ca    8.6      30 Jun 2021 00:40  Phos  1.8     06-30  Mg     1.8     06-30    TPro  6.9  /  Alb  3.1<L>  /  TBili  0.3  /  DBili  x   /  AST  27  /  ALT  24  /  AlkPhos  167<H>  06-30   PTT - ( 30 Jun 2021 00:40 )  PTT:49.6 sec    PHYSICAL EXAM:  Gen: NAD, On Vent.   Skin: No rashes, bruises, or lesions.  Head: Normocephalic, Atraumatic.  Face: no edema, erythema, or fluctuance. Parotid glands soft without mass.  Eyes: no scleral injection.  Nose: Nares bilaterally patent, no discharge  Mouth: No Stridor / Drooling / Trismus.  Mucosa moist, tongue/uvula midline, oropharynx clear  Neck: # 8 DCT Trach secured with sutures x 4 and Umbilical Tie. Minimal oozing of serosanguinous secretions,.  No bleeding noted. No hematoma/crepitus. Flat, supple, no lymphadenopathy, trachea midline, no masses.  Lymphatic: No lymphadenopathy  Resp: On Vent.   Neuro: Unable to obtain.

## 2021-06-30 NOTE — PROGRESS NOTE ADULT - ATTENDING COMMENTS
Tolerated longer trials of trach collar yesterday. Goal is to continue to work on his pulmonary issues.  Stable from hemodynamic perspective, but still febrile. On broad spectrum antibiotics.    LDH stable/improved without suggestion of hemolysis. Lactate is normal.  VAD interrogation without alarms or events, no changes made.  TTE from yesterday reviewed and LVIDd 5.63 cm with at least mild MR (long, but thin jet), and AV opens with every beat. Could likely tolerate a speed increase if pulmonary edema/secretions continue, but will review again tomorrow on team rounds. MAPs at goal currently.  Continue current care.

## 2021-06-30 NOTE — PROGRESS NOTE ADULT - PROBLEM SELECTOR PLAN 5
- continue w/ abxs for VAP tx per ID  - c/w tx acute Cdiff, as above.  - one set of bcx w/ Staph flaviais. F/u ID for significance vs contamination. Await repeat cxs.

## 2021-06-30 NOTE — PROGRESS NOTE ADULT - PROBLEM SELECTOR PLAN 2
- Current speed 9000 RPM to better unload ventricle. Await repeat TTE.  - remains euvolemic/dry on exam today.  - c/w furosemide 40mg IV BID  - c/w heparin with target of lower PTT goal at this time. Eventual resumption of warfarin   - Will plan to hold aspirin indefinitely given GIB  - Continue to monitor LDH daily.  - Potassium 3.6 potassium given rechecking K+ this am - Current speed 9000 RPM to better unload ventricle. TTE reviewed w/ some MR and large LV w/ AV opening every beat; will consider incr speed again  - remains euvolemic/dry on exam today.  - c/w furosemide 40mg IV BID  - c/w heparin with target of lower PTT goal at this time. Eventual resumption of warfarin   - Will plan to hold aspirin indefinitely given GIB  - Continue to monitor LDH daily.  - Potassium 3.6 potassium given rechecking K+ this am

## 2021-06-30 NOTE — PROGRESS NOTE ADULT - SUBJECTIVE AND OBJECTIVE BOX
INFECTIOUS DISEASES FOLLOW UP-- Anitra Prater  [phone]    This is a follow up note for this  66yMale with  Anemia  LVAD in place  one blood culture with staph lugdenesis- low grade fevers        ROS:  CONSTITUTIONAL:  low grade fever,   CARDIOVASCULAR:  No chest pain or palpitations  RESPIRATORY:  trach site with purulent secretions  GASTROINTESTINAL: loose stool  GENITOURINARY:  No dysuria  NEUROLOGIC:  No headache,     Allergies    No Known Allergies    Intolerances        ANTIBIOTICS/RELEVANT:  antimicrobials  metroNIDAZOLE  IVPB 500 milliGRAM(s) IV Intermittent every 8 hours  vancomycin    Solution 500 milliGRAM(s) Oral every 6 hours  vancomycin  IVPB 1000 milliGRAM(s) IV Intermittent every 12 hours    immunologic:    OTHER:  acetaminophen    Suspension .. 650 milliGRAM(s) Oral every 6 hours PRN  aMIOdarone    Tablet 200 milliGRAM(s) Oral daily  chlorhexidine 0.12% Liquid 15 milliLiter(s) Oral Mucosa every 12 hours  chlorhexidine 2% Cloths 1 Application(s) Topical <User Schedule>  clonazePAM  Tablet 0.5 milliGRAM(s) Oral every 8 hours  dexMEDEtomidine Infusion 0.5 MICROgram(s)/kG/Hr IV Continuous <Continuous>  heparin  Infusion 400 Unit(s)/Hr IV Continuous <Continuous>  insulin lispro (ADMELOG) corrective regimen sliding scale   SubCutaneous every 6 hours  pantoprazole  Injectable 40 milliGRAM(s) IV Push daily  sodium chloride 0.9%. 1000 milliLiter(s) IV Continuous <Continuous>      Objective:  Vital Signs Last 24 Hrs  T(C): 37.5 (30 Jun 2021 14:00), Max: 37.5 (30 Jun 2021 14:00)  T(F): 99.5 (30 Jun 2021 14:00), Max: 99.5 (30 Jun 2021 14:00)  HR: 103 (30 Jun 2021 17:00) (74 - 103)  BP: --  BP(mean): --  RR: 28 (30 Jun 2021 17:00) (19 - 49)  SpO2: 94% (30 Jun 2021 17:00) (93% - 100%)    PHYSICAL EXAM:  Constitutional:no acute distress  Eyes:ALONSO, EOMI  Ear/Nose/Throat: no oral lesions, 	  Respiratory: audible BS bilateral  Cardiovascular: VAD sounds  Gastrointestinal:soft, (+) BS, no tenderness  exit site CDI  Extremities:no e/e/c  No Lymphadenopathy  IV sites not inflammed.    LABS:                        8.6    12.24 )-----------( 385      ( 30 Jun 2021 00:40 )             28.2     06-30    137  |  104  |  11  ----------------------------<  161<H>  4.0   |  22  |  0.57    Ca    8.6      30 Jun 2021 00:40  Phos  1.8     06-30  Mg     1.8     06-30    TPro  6.9  /  Alb  3.1<L>  /  TBili  0.3  /  DBili  x   /  AST  27  /  ALT  24  /  AlkPhos  167<H>  06-30    PTT - ( 30 Jun 2021 00:40 )  PTT:49.6 sec      MICROBIOLOGY:      Culture - Blood (06.29.21 @ 17:31)    Specimen Source: .Blood Blood-Peripheral    Culture Results:   No growth to date.          RECENT CULTURES:  06-29 @ 17:31  .Blood Blood-Peripheral  --  --  --    No growth to date.  --  06-26 @ 06:53  .Blood Blood-Peripheral  Staphylococcus lugdunensis  Staphylococcus lugdunensis  CLAU    No growth to date.  --  06-23 @ 22:49  .Blood Blood  --  --  --    No Growth Final  --      RADIOLOGY & ADDITIONAL STUDIES:    < from: Xray Chest 1 View- PORTABLE-Urgent (Xray Chest 1 View- PORTABLE-Urgent .) (06.30.21 @ 10:10) >  IMPRESSION:    Enteric  tube coiled in the stomach and should be reposition.. Jaclyn Llamas was informed by Dr. Cloud with read back confirmation on 6/30/2021 at 10:41 AM.    < end of copied text >

## 2021-06-30 NOTE — PROGRESS NOTE ADULT - SUBJECTIVE AND OBJECTIVE BOX
RICYK HAMPTON  MRN-[id number]  Patient is a 66y old  Male who presents with a chief complaint of Anemia, Supratherapeutic INR, Dark Stools (2021 22:42)    HPI:  65M PMH ACC/AHA stage D HF due to NICM HM2 LVAD , TV annuloplasty ring 17 as destination therapy due to severe peripheral artery disease with significant stenosis  SIADH, Depression, CKD-3 with hyperkalemia, past E. coli UTIs, driveline drainage (21) and COVID-19 (back in 2020)  He was recently seen in clinic where he complained of abdominal pain and dark stools w constipation back in May. He presents to [facility] ER today weakness and fatigue, moderate and + Black stools for three days, on coumadin secondary to warfarin use in the setting of an LVAD. Patient has required transfusions for GIB in the past. Mostly recently back in 2021 pt had anemia with dark stools. No interventions was done at that time. However Last Endoscopy was done in 2020 (negative). Today labs show patient is anemic with H/H of 4.5/16.3,. INR is 8.84 MAP in the 90s, Temp 35.1. He denies any chest pain, shortness of breath, dizziness, abd pain, nausea or vomiting.       (2021 16:57)      Surgery/Hospital Course:   admit for melena w/ anemia, INR 8.84   6/15 Capsul study (+) for small bowel bleed, balloon endoscopy (old blood in prox ileum); post EGD - septic w/ L opacity, re-intubated for concern for aspiration, TTE (Mod MR, decrease biV w/ interventricular septum boweing towards R)   bronch    TC    CT C/A/P: Fluid filled colon which may be 2/2 rapid transit. Small bilateral pleffs with associates. Compressive atelectasis New ISABELLE & LLL  parenchymal opacities, suspicious for pneumonia. 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    #8 Ricky trach at bedside    CPAP trials     Today:  No acute events     ICU Vital Signs Last 24 Hrs  T(C): 37.1 (2021 04:00), Max: 37.3 (2021 00:00)  T(F): 98.8 (2021 04:00), Max: 99.1 (2021 00:00)  HR: 80 (2021 07:00) (64 - 100)  BP: --  BP(mean): --  ABP: 112/64 (2021 07:00) (89/53 - 139/73)  ABP(mean): 81 (2021 07:00) (65 - 99)  RR: 41 (2021 07:00) (19 - 43)  SpO2: 97% (2021 07:00) (95% - 100%)      Physical Exam:  Gen:  intubated   CNS: sedated 	  Neck: no JVD  RES : clear , no wheezing              CVS: Regular  rhythm. Normal S1/S2  Abd: Soft, non-distended. Bowel sounds present.  Skin: No rash.  Ext:  no edema      ============================I/O===========================   I&O's Detail    2021 07:01  -  2021 07:00  --------------------------------------------------------  IN:    Dexmedetomidine: 184.4 mL    Enteral Tube Flush: 125 mL    Heparin: 300 mL    IV PiggyBack: 917 mL    IV PiggyBack: 100 mL    Miscellaneous Tube Feedin mL    PRBCs (Packed Red Blood Cells): 310 mL    sodium chloride 0.9%: 240 mL  Total IN: 3616.4 mL    OUT:    Indwelling Catheter - Urethral (mL): 1300 mL  Total OUT: 1300 mL    Total NET: 2316.4 mL        ============================ LABS =========================                        8.6    12.24 )-----------( 385      ( 2021 00:40 )             28.2     06-30    137  |  104  |  11  ----------------------------<  161<H>  4.0   |  22  |  0.57    Ca    8.6      2021 00:40  Phos  1.8     06-30  Mg     1.8     -30    TPro  6.9  /  Alb  3.1<L>  /  TBili  0.3  /  DBili  x   /  AST  27  /  ALT  24  /  AlkPhos  167<H>  06-30    LIVER FUNCTIONS - ( 2021 00:40 )  Alb: 3.1 g/dL / Pro: 6.9 g/dL / ALK PHOS: 167 U/L / ALT: 24 U/L / AST: 27 U/L / GGT: x           PTT - ( 2021 00:40 )  PTT:49.6 sec  ABG - ( 2021 00:33 )  pH, Arterial: 7.40  pH, Blood: x     /  pCO2: 43    /  pO2: 123   / HCO3: 26    / Base Excess: 1.9   /  SaO2: 99                  ======================Micro/Rad/Cardio=================  Culture: Reviewed   CXR: Reviewed  Echo:Reviewed  ======================================================  PAST MEDICAL & SURGICAL HISTORY:  CHF (congestive heart failure)    CAD (coronary artery disease)    Depression    Pleural effusion    History of 2019 novel coronavirus disease (COVID-19)  2020    Hemorrhoids    Bleeding hemorrhoids    Peripheral arterial disease    Claudication    BPH with urinary obstruction    ACC/AHA stage D systolic heart failure    Anticoagulation goal of INR 2.0 to 2.5    Falls    Clavicle fracture    CKD (chronic kidney disease), stage III    Iron deficiency anemia    H/O epistaxis    Vertigo    GI bleed    S/P TVR (tricuspid valve repair)    S/P ventricular assist device    S/P endoscopy      ====================ASSESSMENT ==============  Stage D Nonischemic Cardiomyopathy, Status Post HM2 on 2017   Recent driveline infection on 2021   Cardiogenic shock  Hemodynamic instability   Hypovolemic shock  Acute hypoxemic respiratory failure  Aspiration pneumonia  Septic shock  GI bleed , Status Post Enteroscopy   Anemia, in setting of melena   Chronic Kidney Disease  Leukocytosis  Coagulopathy   Stress hyperglycemia   C.diff positive on    Fevers      Plan:  ====================== NEUROLOGY=====================  Sedated with IV Precedex to maintain vent synchrony   C/w Clonazepam for anxiety   Tylenol PRN for fevers     acetaminophen    Suspension .. 650 milliGRAM(s) Oral every 6 hours PRN Temp greater or equal to 38C (100.4F)  clonazePAM  Tablet 0.5 milliGRAM(s) Oral every 8 hours  dexMEDEtomidine Infusion 0.5 MICROgram(s)/kG/Hr (9.81 mL/Hr) IV Continuous <Continuous>    ==================== RESPIRATORY======================  Acute hypoxemic respiratory failure s/p trach #8 shiley on     Continue TC trials as tolerated with suctioning prn  Respiratory status required intermittent full ventilatory support, close monitoring of respiratory rate and breathing pattern, the following of ABG’s with A-line monitoring, continuous pulse oximetry monitoring     Mechanical Ventilation:  Mode: AC/ CMV (Assist Control/ Continuous Mandatory Ventilation)  RR (machine): 12  TV (machine): 450  FiO2: 40  PEEP: 5  ITime: 1  MAP: 12  PIP: 24      ====================CARDIOVASCULAR==================  Stage D NICM, S/P HM2 on 2017; recent driveline infection on 2021; HM2 settings: 9000 rpm, flow 5.0  TTE 6/15: severe global LV systolic dysfunction with HM2 in place, decreased RV systolic function, interventricular septums bows slightly to right, otherwise grossly similar to prior.   Continue invasive hemodynamic monitoring   Rate control with Amiodarone      aMIOdarone    Tablet 200 milliGRAM(s) Oral daily    ===================HEMATOLOGIC/ONC ===================  Acute blood loss Anemia, Monitor H&H/Plts    Continue AC therapy with IV Heparin for HM2 circuit and VTE prophylaxis      heparin  Infusion 400 Unit(s)/Hr (12.5 mL/Hr) IV Continuous <Continuous>    ===================== RENAL =========================  Continue to monitor I/Os, BUN/Creatinine, and urine output.   Goal net negative fluid balance. Replete lytes PRN. Keep K> 4 and Mg >2.     ==================== GASTROINTESTINAL===================  Tolerating tube feeds, Vital AF @ 60cc/hr   +C.diff on , with enteral feed intolerance  CT C/A/P: fluid filled colon which may be 2/2 rapid transit  Continue Protonix for stress ulcer prophylaxis.     pantoprazole  Injectable 40 milliGRAM(s) IV Push daily  sodium chloride 0.9%. 1000 milliLiter(s) (10 mL/Hr) IV Continuous <Continuous>    =======================    ENDOCRINE  =====================  Stress hyperglycemia, continue glucose control with admelog sliding scale     insulin lispro (ADMELOG) corrective regimen sliding scale   SubCutaneous every 6 hours    ========================INFECTIOUS DISEASE================  Temperature 98.8F, WBC at12.24  Continue to monitor temperature and WBC   +C.diff on  c/w vancomycin solution and flagyl   IVPB Vancomycin for perioperative coverage     metroNIDAZOLE  IVPB 500 milliGRAM(s) IV Intermittent every 8 hours  vancomycin    Solution 500 milliGRAM(s) Oral every 6 hours  vancomycin  IVPB 1000 milliGRAM(s) IV Intermittent every 12 hours          I have spent 35 minutes providing acute care for this critically ill patient     Patient requires continuous monitoring with bedside rhythm monitoring, pulse ox monitoring, and intermittent blood gas analysis. Care plan discussed with ICU care team. Patient remained critical and at risk for life threatening decompensation.     By signing my name below, I, Rojas Junior, attest that this documentation has been prepared under the direction and in the presence of Kota Thomas MD   Electronically signed: Cesia Cohen, 21 @ 07:21    I, Kota Thomas, personally performed the services described in this documentation. all medical record entries made by the scribe were at my direction and in my presence. I have reviewed the chart and agree that the record reflects my personal performance and is accurate and complete  Electronically signed: Kota Thomas MD        RICKY HAMPTON  MRN-[id number]  Patient is a 66y old  Male who presents with a chief complaint of Anemia, Supratherapeutic INR, Dark Stools (2021 22:42)    HPI:  65M PMH ACC/AHA stage D HF due to NICM HM2 LVAD , TV annuloplasty ring 17 as destination therapy due to severe peripheral artery disease with significant stenosis  SIADH, Depression, CKD-3 with hyperkalemia, past E. coli UTIs, driveline drainage (21) and COVID-19 (back in 2020)  He was recently seen in clinic where he complained of abdominal pain and dark stools w constipation back in May. He presents to [facility] ER today weakness and fatigue, moderate and + Black stools for three days, on coumadin secondary to warfarin use in the setting of an LVAD. Patient has required transfusions for GIB in the past. Mostly recently back in 2021 pt had anemia with dark stools. No interventions was done at that time. However Last Endoscopy was done in 2020 (negative). Today labs show patient is anemic with H/H of 4.5/16.3,. INR is 8.84 MAP in the 90s, Temp 35.1. He denies any chest pain, shortness of breath, dizziness, abd pain, nausea or vomiting.       (2021 16:57)      Surgery/Hospital Course:   admit for melena w/ anemia, INR 8.84   6/15 Capsul study (+) for small bowel bleed, balloon endoscopy (old blood in prox ileum); post EGD - septic w/ L opacity, re-intubated for concern for aspiration, TTE (Mod MR, decrease biV w/ interventricular septum boweing towards R)   bronch    TC    CT C/A/P: Fluid filled colon which may be 2/2 rapid transit. Small bilateral pleffs with associates. Compressive atelectasis New ISABELLE & LLL  parenchymal opacities, suspicious for pneumonia. 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    #8 Ricky trach at bedside    CPAP trials     Today: Patient is neurologically intact. Patient is currently on TC and full vent rest overnight. Continue Lasix to maintain net negative fluid balance. Continue PT and mobilization as tolerated.     ICU Vital Signs Last 24 Hrs  T(C): 37.1 (2021 04:00), Max: 37.3 (2021 00:00)  T(F): 98.8 (2021 04:00), Max: 99.1 (2021 00:00)  HR: 80 (2021 07:00) (64 - 100)  BP: --  BP(mean): --  ABP: 112/64 (2021 07:00) (89/53 - 139/73)  ABP(mean): 81 (2021 07:00) (65 - 99)  RR: 41 (2021 07:00) (19 - 43)  SpO2: 97% (2021 07:00) (95% - 100%)      Physical Exam:  Gen:  intubated   CNS: sedated 	  Neck: no JVD  RES : clear , no wheezing              CVS: Regular  rhythm. Normal S1/S2  Abd: Soft, non-distended. Bowel sounds present.  Skin: No rash.  Ext:  no edema      ============================I/O===========================   I&O's Detail    2021 07:01  -  2021 07:00  --------------------------------------------------------  IN:    Dexmedetomidine: 184.4 mL    Enteral Tube Flush: 125 mL    Heparin: 300 mL    IV PiggyBack: 917 mL    IV PiggyBack: 100 mL    Miscellaneous Tube Feedin mL    PRBCs (Packed Red Blood Cells): 310 mL    sodium chloride 0.9%: 240 mL  Total IN: 3616.4 mL    OUT:    Indwelling Catheter - Urethral (mL): 1300 mL  Total OUT: 1300 mL    Total NET: 2316.4 mL        ============================ LABS =========================                        8.6    12.24 )-----------( 385      ( 2021 00:40 )             28.2         137  |  104  |  11  ----------------------------<  161<H>  4.0   |  22  |  0.57    Ca    8.6      2021 00:40  Phos  1.8       Mg     1.8     30    TPro  6.9  /  Alb  3.1<L>  /  TBili  0.3  /  DBili  x   /  AST  27  /  ALT  24  /  AlkPhos  167<H>  30    LIVER FUNCTIONS - ( 2021 00:40 )  Alb: 3.1 g/dL / Pro: 6.9 g/dL / ALK PHOS: 167 U/L / ALT: 24 U/L / AST: 27 U/L / GGT: x           PTT - ( 2021 00:40 )  PTT:49.6 sec  ABG - ( 2021 00:33 )  pH, Arterial: 7.40  pH, Blood: x     /  pCO2: 43    /  pO2: 123   / HCO3: 26    / Base Excess: 1.9   /  SaO2: 99                  ======================Micro/Rad/Cardio=================  Culture: Reviewed   CXR: Reviewed  Echo:Reviewed  ======================================================  PAST MEDICAL & SURGICAL HISTORY:  CHF (congestive heart failure)    CAD (coronary artery disease)    Depression    Pleural effusion    History of 2019 novel coronavirus disease (COVID-19)  2020    Hemorrhoids    Bleeding hemorrhoids    Peripheral arterial disease    Claudication    BPH with urinary obstruction    ACC/AHA stage D systolic heart failure    Anticoagulation goal of INR 2.0 to 2.5    Falls    Clavicle fracture    CKD (chronic kidney disease), stage III    Iron deficiency anemia    H/O epistaxis    Vertigo    GI bleed    S/P TVR (tricuspid valve repair)    S/P ventricular assist device    S/P endoscopy      ====================ASSESSMENT ==============  Stage D Nonischemic Cardiomyopathy, Status Post HM2 on 2017   Recent driveline infection on 2021   Cardiogenic shock  Hemodynamic instability   Hypovolemic shock  Acute hypoxemic respiratory failure  Aspiration pneumonia  Septic shock  GI bleed , Status Post Enteroscopy   Anemia, in setting of melena   Chronic Kidney Disease  Leukocytosis  Coagulopathy   Stress hyperglycemia   C.diff positive on    Fevers      Plan:  ====================== NEUROLOGY=====================  Sedated with IV Precedex to maintain vent synchrony   C/w Clonazepam for anxiety   Tylenol PRN for fevers   Continue PT and mobilization as tolerated    acetaminophen    Suspension .. 650 milliGRAM(s) Oral every 6 hours PRN Temp greater or equal to 38C (100.4F)  clonazePAM  Tablet 0.5 milliGRAM(s) Oral every 8 hours  dexMEDEtomidine Infusion 0.5 MICROgram(s)/kG/Hr (9.81 mL/Hr) IV Continuous <Continuous>    ==================== RESPIRATORY======================  Acute hypoxemic respiratory failure s/p trach #8 shiley on     Continue TC trials as tolerated with suctioning prn, TC in the day and full vent rest overnight.   Respiratory status required intermittent full ventilatory support, close monitoring of respiratory rate and breathing pattern, the following of ABG’s with A-line monitoring, continuous pulse oximetry monitoring     Mechanical Ventilation:  Mode: AC/ CMV (Assist Control/ Continuous Mandatory Ventilation)  RR (machine): 12  TV (machine): 450  FiO2: 40  PEEP: 5  ITime: 1  MAP: 12  PIP: 24      ====================CARDIOVASCULAR==================  Stage D NICM, S/P HM2 on 2017; recent driveline infection on 2021; HM2 settings: 9000 rpm, flow 5.0  TTE 6/15: severe global LV systolic dysfunction with HM2 in place, decreased RV systolic function, interventricular septums bows slightly to right, otherwise grossly similar to prior.   Continue invasive hemodynamic monitoring   Rate control with Amiodarone      aMIOdarone    Tablet 200 milliGRAM(s) Oral daily    ===================HEMATOLOGIC/ONC ===================  Acute blood loss Anemia, Monitor H&H/Plts    Continue AC therapy with IV Heparin for HM2 circuit and VTE prophylaxis      heparin  Infusion 400 Unit(s)/Hr (12.5 mL/Hr) IV Continuous <Continuous>    ===================== RENAL =========================  Continue to monitor I/Os, BUN/Creatinine, and urine output.   Goal net negative fluid balance. Replete lytes PRN. Keep K> 4 and Mg >2.   Continue Lasix to maintain net negative fluid balance.     ==================== GASTROINTESTINAL===================  Tolerating tube feeds, Vital AF @ 60cc/hr   +C.diff on , with enteral feed intolerance  CT C/A/P: fluid filled colon which may be 2/2 rapid transit  Continue Protonix for stress ulcer prophylaxis.     pantoprazole  Injectable 40 milliGRAM(s) IV Push daily  sodium chloride 0.9%. 1000 milliLiter(s) (10 mL/Hr) IV Continuous <Continuous>    =======================    ENDOCRINE  =====================  Stress hyperglycemia, continue glucose control with admelog sliding scale     insulin lispro (ADMELOG) corrective regimen sliding scale   SubCutaneous every 6 hours    ========================INFECTIOUS DISEASE================  Temperature 98.8F, WBC at12.24  Continue to monitor temperature and WBC   +C.diff on  c/w vancomycin solution and flagyl   IVPB Vancomycin for perioperative coverage     metroNIDAZOLE  IVPB 500 milliGRAM(s) IV Intermittent every 8 hours  vancomycin    Solution 500 milliGRAM(s) Oral every 6 hours  vancomycin  IVPB 1000 milliGRAM(s) IV Intermittent every 12 hours          I have spent 35 minutes providing acute care for this critically ill patient     Patient requires continuous monitoring with bedside rhythm monitoring, pulse ox monitoring, and intermittent blood gas analysis. Care plan discussed with ICU care team. Patient remained critical and at risk for life threatening decompensation.     By signing my name below, I, Rojas Junior, attest that this documentation has been prepared under the direction and in the presence of Kota Thomas MD   Electronically signed: Cesia Cohen, 21 @ 07:21    I, Kota Thomas, personally performed the services described in this documentation. all medical record entries made by the scribe were at my direction and in my presence. I have reviewed the chart and agree that the record reflects my personal performance and is accurate and complete  Electronically signed: Kota Thomas MD

## 2021-06-30 NOTE — PROGRESS NOTE ADULT - SUBJECTIVE AND OBJECTIVE BOX
Interval History: NAD. This AM, pt awake and in chair. Gestures he is having some chest/upper abd discomfort.    Medications:  acetaminophen    Suspension .. 650 milliGRAM(s) Oral every 6 hours PRN  aMIOdarone    Tablet 200 milliGRAM(s) Oral daily  chlorhexidine 0.12% Liquid 15 milliLiter(s) Oral Mucosa every 12 hours  chlorhexidine 2% Cloths 1 Application(s) Topical <User Schedule>  clonazePAM  Tablet 0.5 milliGRAM(s) Oral every 8 hours  dexMEDEtomidine Infusion 0.5 MICROgram(s)/kG/Hr IV Continuous <Continuous>  furosemide   Injectable 40 milliGRAM(s) IV Push every 12 hours  heparin  Infusion 400 Unit(s)/Hr IV Continuous <Continuous>  insulin lispro (ADMELOG) corrective regimen sliding scale   SubCutaneous every 6 hours  metroNIDAZOLE  IVPB 500 milliGRAM(s) IV Intermittent every 8 hours  pantoprazole  Injectable 40 milliGRAM(s) IV Push daily  sodium chloride 0.9%. 1000 milliLiter(s) IV Continuous <Continuous>  vancomycin    Solution 500 milliGRAM(s) Oral every 6 hours  vancomycin  IVPB 1000 milliGRAM(s) IV Intermittent every 12 hours      Vitals:  T(C): 37.1 (21 @ 08:00), Max: 37.3 (21 @ 00:00)  HR: 85 (21 @ 10:00) (64 - 100)  BP: --  BP(mean): --  ABP: 116/68 (21 @ 10:00) (89/65 - 139/73)  ABP(mean): 85 (21 @ 10:00) (68 - 99)  RR: 27 (21 @ 10:00) (19 - 43)  SpO2: 98% (21 @ 10:00) (95% - 100%)    Daily     Daily Weight in k.5 (2021 00:00)        I&O's Summary    2021 07:01  -  2021 07:00  --------------------------------------------------------  IN: 3616.4 mL / OUT: 1300 mL / NET: 2316.4 mL    2021 07:01  -  2021 10:43  --------------------------------------------------------  IN: 164.5 mL / OUT: 325 mL / NET: -160.5 mL        Physical Exam:  Gen: NAD.  HEENT: NCAT. NGT in place.  Neck: No JVP elev.  CV: Normal S1, S2. RRR. No MRG.  Chest: CTAB. No WRR.  Abd: +BSx4. Soft. NTND.  Ext: No LE edema. Mittens in place.  Skin: No cyanosis.    LVAD Interrogation:  Pump Speed: 9000  Pump Flow: 4.7  Pulse Index: 5.7  Pump Power: 5  VAD Events: None  Driveline evaluation: CDI  Programming Changes: No changes made    Labs:                        8.6    12.24 )-----------( 385      ( 2021 00:40 )             28.2     06-30    137  |  104  |  11  ----------------------------<  161<H>  4.0   |  22  |  0.57    Ca    8.6      2021 00:40  Phos  1.8     06-30  Mg     1.8     06-30    TPro  6.9  /  Alb  3.1<L>  /  TBili  0.3  /  DBili  x   /  AST  27  /  ALT  24  /  AlkPhos  167<H>  06-30    PTT - ( 2021 00:40 )  PTT:49.6 sec Interval History: NAD. This AM, pt awake and in chair. Gestures he is having some chest/upper abd discomfort.    Medications:  acetaminophen    Suspension .. 650 milliGRAM(s) Oral every 6 hours PRN  aMIOdarone    Tablet 200 milliGRAM(s) Oral daily  chlorhexidine 0.12% Liquid 15 milliLiter(s) Oral Mucosa every 12 hours  chlorhexidine 2% Cloths 1 Application(s) Topical <User Schedule>  clonazePAM  Tablet 0.5 milliGRAM(s) Oral every 8 hours  dexMEDEtomidine Infusion 0.5 MICROgram(s)/kG/Hr IV Continuous <Continuous>  furosemide   Injectable 40 milliGRAM(s) IV Push every 12 hours  heparin  Infusion 400 Unit(s)/Hr IV Continuous <Continuous>  insulin lispro (ADMELOG) corrective regimen sliding scale   SubCutaneous every 6 hours  metroNIDAZOLE  IVPB 500 milliGRAM(s) IV Intermittent every 8 hours  pantoprazole  Injectable 40 milliGRAM(s) IV Push daily  sodium chloride 0.9%. 1000 milliLiter(s) IV Continuous <Continuous>  vancomycin    Solution 500 milliGRAM(s) Oral every 6 hours  vancomycin  IVPB 1000 milliGRAM(s) IV Intermittent every 12 hours      Vitals:  T(C): 37.1 (21 @ 08:00), Max: 37.3 (21 @ 00:00)  HR: 85 (21 @ 10:00) (64 - 100)  BP: --  BP(mean): --  ABP: 116/68 (21 @ 10:00) (89/65 - 139/73)  ABP(mean): 85 (21 @ 10:00) (68 - 99)  RR: 27 (21 @ 10:00) (19 - 43)  SpO2: 98% (21 @ 10:00) (95% - 100%)    Daily     Daily Weight in k.5 (2021 00:00)        I&O's Summary    2021 07:01  -  2021 07:00  --------------------------------------------------------  IN: 3616.4 mL / OUT: 1300 mL / NET: 2316.4 mL    2021 07:01  -  2021 10:43  --------------------------------------------------------  IN: 164.5 mL / OUT: 325 mL / NET: -160.5 mL        Physical Exam:  Gen: NAD.  HEENT: NCAT. NGT in place. Trach in place.  Neck: No JVP elev.  CV: Normal S1, S2. RRR. No MRG.  Chest: CTAB. No WRR.  Abd: +BSx4. Soft. NTND.  Ext: No LE edema. Mittens in place.  Skin: No cyanosis.    LVAD Interrogation:  Pump Speed: 9000  Pump Flow: 4.7  Pulse Index: 5.7  Pump Power: 5  VAD Events: None  Driveline evaluation: CDI  Programming Changes: No changes made    Labs:                        8.6    12.24 )-----------( 385      ( 2021 00:40 )             28.2     06-30    137  |  104  |  11  ----------------------------<  161<H>  4.0   |  22  |  0.57    Ca    8.6      2021 00:40  Phos  1.8     06-30  Mg     1.8     06-30    TPro  6.9  /  Alb  3.1<L>  /  TBili  0.3  /  DBili  x   /  AST  27  /  ALT  24  /  AlkPhos  167<H>  06-30    PTT - ( 2021 00:40 )  PTT:49.6 sec

## 2021-07-01 NOTE — PROGRESS NOTE ADULT - SUBJECTIVE AND OBJECTIVE BOX
RICKY JOINT  MRN#: [id number]  Subjective:  Pulmonary progress  : recurrent Acute hypoxic respiratory Failure ,aspiration pneumonia, NICM  , chart reviewed and H/O obtained radiological and Laboratory study reviewed patient Examined     65M PMH ACC/AHA stage D HF due to NICM HM2 LVAD , TV annuloplasty ring 17 as destination therapy due to severe peripheral artery disease with significant stenosis  SIADH, Depression, CKD-3 with hyperkalemia, past E. coli UTIs, driveline drainage (21) and COVID-19 (back in 2020)  He was recently seen in clinic where he complained of abdominal pain and dark stools w constipation back in May. He presents to [facility] ER today weakness and fatigue, moderate and + Black stools for three days, on coumadin secondary to warfarin use in the setting of an LVAD. Patient has required transfusions for GIB in the past. Mostly recently back in 2021 pt had anemia with dark stools. No interventions was done at that time. However Last Endoscopy was done in 2020 (negative). Today labs show patient is anemic with H/H of 4.5/16.3,. INR is 8.84 MAP in the 90s, Temp 35.1. He denies any chest pain, shortness of breath, dizziness, abd pain, nausea or vomiting. found to have  rectal bleeding underwent endoscopy ,old blood in the proximal ileum ,  develop sepsis with LL opacity given Antibiotics , Extubated , reintubated , Bronchoscopy on Zosyn for LL pneumonia  and Amiodarone S/P TV Annuloplasty , patient remain intubated on full ventilatory support .S/P multiple units of blood transfusion , remain on full ventilatory support on Precedex and propofol , new central IJ line , diarrhea C diff. +ve on po vancomycin and IV Flagyl,  mildly distended belly , fever start on cefepime 2gm q 8 hrs S/P tracheostomy .  new RT Subclavian central line continue on contact  isolation ,still diarrhea on C-diff antibiotics ENT follow up appreciated , trial of C-PAP as tolerated , , copious secretion from trach. site chest x ray left lower lobe pneumonia , tolerating trch. collar 50% FI02 still excessive secretion need pulmonary toilet , on Ancef antibiotic          (2021 16:57)    PAST MEDICAL & SURGICAL HISTORY:  CHF (congestive heart failure)    CAD (coronary artery disease)  Depression    Pleural effusion    History of 2019 novel coronavirus disease (COVID-19)  2020    Hemorrhoids    Bleeding hemorrhoids    Peripheral arterial disease    Claudication    BPH with urinary obstruction    ACC/AHA stage D systolic heart failure  Anticoagulation goal of INR 2.0 to 2.5    Falls    Clavicle fracture    CKD (chronic kidney disease), stage III    Iron deficiency anemia    H/O epistaxis    Vertigo    GI bleed    S/P TVR (tricuspid valve repair)    S/P ventricular assist device    S/P endoscopy    OBJECTIVE:  ICU Vital Signs Last 24 Hrs  T(C): 38.2 (2021 10:00), Max: 38.5 (2021 12:00)  T(F): 100.8 (2021 10:00), Max: 101.3 (2021 12:00)  HR: 65 (2021 10:00) (61 - 69)  BP: --  BP(mean): --  ABP: 105/67 (2021 10:00) (90/54 - 113/64)  ABP(mean): 77 (2021 10:00) (63 - 77)  RR: 20 (2021 10:00) (19 - 35)  SpO2: 99% (2021 10:00) (96% - 100%)       @ : @ 07:00  --------------------------------------------------------  IN: 2693.9 mL / OUT: 1415 mL / NET: 1278.9 mL     @ : @ 10:49  --------------------------------------------------------  IN: 420.8 mL / OUT: 115 mL / NET: 305.8 mL  PHYSICAL EXAM:Daily   Elderly male S/P tracheostomy  sedated  on full ventilatory support /40%20/5cm PEEP  0n vasopressin , alternating with trach. collar   Daily Weight in k.4 (2021 00:00)  HEENT:     + NCAT  + EOMI  - Conjuctival edema   - Icterus   - Thrush   - ETT  + NGT/OGT  Neck:         + FROM   RT SC line  JVD     - Nodes     - Masses    + Mid-line trachea + Tracheostomy  Chest: normal findings  Lungs:          + CTA   + Rhonchi    - Rales    - Wheezing + Decreased  LT BS   - Dullness R L  Cardiac:       + S1 + S2    + RRR   - Irregular   - S3  - S4    - Murmurs   - Rub   - Hamman’s sign   Abdomen:    + BS     + Soft    + Non-tender     - Distended    - Organomegaly  - PEG  Extremities:   - Cyanosis U/L   - Clubbing  U/L  + LE/UE Edema   + Capillary refill    + Pulses   Neuro:        - Awake   -  Alert   - Confused   - Lethargic   + Sedated  + Generalized Weakness  Skin:        - Rashes    - Erythema   + Normal incisions   + IV sites intact          HOSPITAL MEDICATIONS: All mediciations reviewed and analyzed  MEDICATIONS  (STANDING):  aMIOdarone    Tablet 200 milliGRAM(s) Oral daily  chlorhexidine 0.12% Liquid 15 milliLiter(s) Oral Mucosa every 12 hours  chlorhexidine 2% Cloths 1 Application(s) Topical <User Schedule>  dexMEDEtomidine Infusion 0.5 MICROgram(s)/kG/Hr (9.81 mL/Hr) IV Continuous <Continuous>  dextrose 50% Injectable 50 milliLiter(s) IV Push every 15 minutes  heparin  Infusion 400 Unit(s)/Hr (12.5 mL/Hr) IV Continuous <Continuous>  HYDROmorphone  Injectable 0.5 milliGRAM(s) IV Push once  insulin lispro (ADMELOG) corrective regimen sliding scale   SubCutaneous every 6 hours  pantoprazole  Injectable 40 milliGRAM(s) IV Push every 12 hours  piperacillin/tazobactam IVPB.. 3.375 Gram(s) IV Intermittent every 8 hours  propofol Infusion 20 MICROgram(s)/kG/Min (9.42 mL/Hr) IV Continuous <Continuous>  sodium chloride 0.9% lock flush 3 milliLiter(s) IV Push every 8 hours  sodium chloride 0.9%. 1000 milliLiter(s) (10 mL/Hr) IV Continuous <Continuous>    MEDICATIONS  (PRN):  acetaminophen    Suspension .. 650 milliGRAM(s) Oral every 6 hours PRN Temp greater or equal to 38C (100.4F)    LABS: All Lab data reviewed and analyzed                                   8.4    11.27 )-----------( 377      ( 2021 01:08 )             27.4       139  |  101  |  8   ----------------------------<  131<H>  3.5   |  25  |  0.58    Ca    8.6      2021 01:10  Phos  2.3     -  Mg     1.9         TPro  7.1  /  Alb  3.6  /  TBili  0.4  /  DBili  x   /  AST  23  /  ALT  19  /  AlkPhos  139<H>      Ca    8.6      2021 00:40  Phos  1.8     -  Mg     1.8         TPro  6.9  /  Alb  3.1<L>  /  TBili  0.3  /  DBili  x   /  AST  27  /  ALT  24  /  AlkPhos  167<H>      Ca    8.7      2021 00:51  Phos  2.5     -  Mg     1.8         TPro  7.0  /  Alb  3.1<L>  /  TBili  0.3  /  DBili  x   /  AST  21  /  ALT  29  /  AlkPhos  181<H>      Ca    8.9      2021 00:52  Phos  2.7     -  Mg     1.7         TPro  7.2  /  Alb  3.0<L>  /  TBili  0.3  /  DBili  x   /  AST  31  /  ALT  38  /  AlkPhos  214<H>                     PTT - ( 2021 04:52 )  PTT:45.2 sec LIVER FUNCTIONS - ( 2021 00:42 )  Alb: 3.4 g/dL / Pro: 6.7 g/dL / ALK PHOS: 213 U/L / ALT: 15 U/L / AST: 24 U/L / GGT: x           RADIOLOGY: - Reviewed and analyzed LLL  pneumonia , LVAD HM2, CT scan of abdomen reviewed result noted

## 2021-07-01 NOTE — PROGRESS NOTE ADULT - ASSESSMENT
imp/rx:  Suspected aspiration event at time of Endoscopic procedure with subsequent development of C.diff diarrhea  Now with increasing leukocytosis and gram + cocci in clusters from blood culture sent 6/26 with organism ID/sensitivity pending  Started IV Vancomycin  Vanco CLAU=4 is borderline  Will change antibiotics to Cefazolin  Would change CVC lines as feasible   repeat blood cultures 6/29 x2 sets are NGTD  Patient's diarrhea improved with holding his feeds- he is also c/o nausea      Clostridiodes difficile :  Abdomen somewhat distended- but soft- loose stool noted this afternoon  Continue Vanco oral 500mg qid  Will discontinue the flagyl as it may be contributing to the nausea and diarrhea improving    Staph lugdenesis- c;eared quickly will treat for a short course with cefazolin x 7days      Morris Prater MD  [phone]  After 5pm/weekends [phone]

## 2021-07-01 NOTE — PROGRESS NOTE ADULT - ATTENDING COMMENTS
S/p tracheostomy. Trach in place and patent. On trach collar doing well. Will continue to follow. Any trach issues please call ENT [phone].

## 2021-07-01 NOTE — PROGRESS NOTE ADULT - ATTENDING COMMENTS
Generally stable and tolerating more trach collar time. He has copious oral secretions. Mild pulmonary edema on CXR.  Speed increased to 9200 during rounds. Will monitor MAP and add hydralazine 10 mg po TID for MAP > 85.  Stable from hemodynamic perspective, but still febrile. On broad spectrum antibiotics.    LDH stable/improved without suggestion of hemolysis. Lactate is normal.  VAD interrogation with multiple low flow alarms yesterday when he had a profound diuresis. No alarms since he received fluid back.  Continue current care.

## 2021-07-01 NOTE — PROGRESS NOTE ADULT - ASSESSMENT
64 YO M with significant PMHx S/P CABG complicated by respiratory failure. Now S/P # 8 DCT Tracheostomy POD #6

## 2021-07-01 NOTE — PROGRESS NOTE ADULT - SUBJECTIVE AND OBJECTIVE BOX
INFECTIOUS DISEASES FOLLOW UP-- Anitra Prater  [phone]    This is a follow up note for this  66yMale with  Anemia, LVAD  C.diff diarrhea  prolonged intubation/trach        ROS:  CONSTITUTIONAL:  No fever, good appetite  CARDIOVASCULAR:  No chest pain or palpitations  RESPIRATORY:  No dyspnea  GASTROINTESTINAL:  No nausea, vomiting, diarrhea, or abdominal pain  GENITOURINARY:  No dysuria  NEUROLOGIC:  No headache,     Allergies    No Known Allergies    Intolerances        ANTIBIOTICS/RELEVANT:  antimicrobials  ceFAZolin   IVPB 2000 milliGRAM(s) IV Intermittent every 8 hours  vancomycin    Solution 500 milliGRAM(s) Oral every 6 hours    immunologic:    OTHER:  acetaminophen    Suspension .. 650 milliGRAM(s) Oral every 6 hours PRN  aMIOdarone    Tablet 200 milliGRAM(s) Oral daily  chlorhexidine 0.12% Liquid 15 milliLiter(s) Oral Mucosa every 12 hours  chlorhexidine 2% Cloths 1 Application(s) Topical <User Schedule>  clonazePAM  Tablet 0.5 milliGRAM(s) Oral at bedtime  heparin  Infusion 400 Unit(s)/Hr IV Continuous <Continuous>  hydrALAZINE 10 milliGRAM(s) Oral every 8 hours PRN  insulin lispro (ADMELOG) corrective regimen sliding scale   SubCutaneous every 6 hours  metoclopramide Injectable 10 milliGRAM(s) IV Push every 8 hours  pantoprazole  Injectable 40 milliGRAM(s) IV Push daily  scopolamine 1 mG/72 Hr(s) Patch 1 Patch Transdermal every 72 hours  sodium chloride 0.9%. 1000 milliLiter(s) IV Continuous <Continuous>      Objective:  Vital Signs Last 24 Hrs  T(C): 37.3 (01 Jul 2021 20:00), Max: 37.7 (01 Jul 2021 04:00)  T(F): 99.1 (01 Jul 2021 20:00), Max: 99.9 (01 Jul 2021 04:00)  HR: 100 (01 Jul 2021 21:26) (85 - 108)  BP: --  BP(mean): --  RR: 39 (01 Jul 2021 21:26) (9 - 720)  SpO2: 98% (01 Jul 2021 21:26) (95% - 100%)    PHYSICAL EXAM:  Constitutional:no acute distress  Eyes:ALONSO, EOMI  Ear/Nose/Throat: no oral lesions, 	  Respiratory: clear BL  Cardiovascular: S1S2  Gastrointestinal:soft, (+) BS, no tenderness  Extremities:no e/e/c  No Lymphadenopathy  IV sites not inflammed.    LABS:                        8.4    11.27 )-----------( 377      ( 01 Jul 2021 01:08 )             27.4     07-01    139  |  101  |  8   ----------------------------<  131<H>  3.5   |  25  |  0.58    Ca    8.6      01 Jul 2021 01:10  Phos  2.3     07-01  Mg     1.9     07-01    TPro  7.1  /  Alb  3.6  /  TBili  0.4  /  DBili  x   /  AST  23  /  ALT  19  /  AlkPhos  139<H>  07-01    PTT - ( 01 Jul 2021 01:59 )  PTT:47.7 sec      MICROBIOLOGY:            RECENT CULTURES:  06-30 @ 20:57  .Sputum Sputum  --  --  --    Normal Respiratory Bria present  --  06-29 @ 17:31  .Blood Blood-Peripheral  --  --  --    No growth to date.  --  06-26 @ 06:53  .Blood Blood-Peripheral  Staphylococcus lugdunensis  Staphylococcus lugdunensis  CLAU    No Growth Final  --      RADIOLOGY & ADDITIONAL STUDIES:  < from: Xray Chest 1 View- PORTABLE-Routine (Xray Chest 1 View- PORTABLE-Routine in AM.) (07.01.21 @ 04:06) >    Bilateral lower lung hazy opacities, slightly smaller, possibly indicating atelectasis and/or pneumonia. Small left pleural effusion, unchanged.    < end of copied text >

## 2021-07-01 NOTE — PROGRESS NOTE ADULT - SUBJECTIVE AND OBJECTIVE BOX
ENT ISSUE/POD: s/p tracheostomy POD6    HPI:       PAST MEDICAL & SURGICAL HISTORY:  CHF (congestive heart failure)    CAD (coronary artery disease)    Depression    Pleural effusion    History of 2019 novel coronavirus disease (COVID-19)  april 2020    Hemorrhoids    Bleeding hemorrhoids    Peripheral arterial disease    Claudication    BPH with urinary obstruction    ACC/AHA stage D systolic heart failure    Anticoagulation goal of INR 2.0 to 2.5    Falls    Clavicle fracture    CKD (chronic kidney disease), stage III    Iron deficiency anemia    H/O epistaxis    Vertigo    GI bleed    S/P TVR (tricuspid valve repair)    S/P ventricular assist device    S/P endoscopy      Allergies    No Known Allergies    Intolerances      MEDICATIONS  (STANDING):  aMIOdarone    Tablet 200 milliGRAM(s) Oral daily  chlorhexidine 0.12% Liquid 15 milliLiter(s) Oral Mucosa every 12 hours  chlorhexidine 2% Cloths 1 Application(s) Topical <User Schedule>  clonazePAM  Tablet 0.5 milliGRAM(s) Oral every 8 hours  dexMEDEtomidine Infusion 0.5 MICROgram(s)/kG/Hr (9.81 mL/Hr) IV Continuous <Continuous>  heparin  Infusion 400 Unit(s)/Hr (12.5 mL/Hr) IV Continuous <Continuous>  insulin lispro (ADMELOG) corrective regimen sliding scale   SubCutaneous every 6 hours  metroNIDAZOLE  IVPB 500 milliGRAM(s) IV Intermittent every 8 hours  pantoprazole  Injectable 40 milliGRAM(s) IV Push daily  sodium chloride 0.9%. 1000 milliLiter(s) (10 mL/Hr) IV Continuous <Continuous>  vancomycin    Solution 500 milliGRAM(s) Oral every 6 hours  vancomycin  IVPB 1000 milliGRAM(s) IV Intermittent every 12 hours    MEDICATIONS  (PRN):  acetaminophen    Suspension .. 650 milliGRAM(s) Oral every 6 hours PRN Temp greater or equal to 38C (100.4F)      ROS:   ENT: all negative except as noted in HPI   Pulm: denies SOB, cough, hemoptysis  Neuro: denies numbness/tingling, loss of sensation  Endo: denies heat/cold intolerance, excessive sweating      Vital Signs Last 24 Hrs  T(C): 37.3 (01 Jul 2021 08:00), Max: 37.7 (01 Jul 2021 04:00)  T(F): 99.1 (01 Jul 2021 08:00), Max: 99.9 (01 Jul 2021 04:00)  HR: 105 (01 Jul 2021 08:00) (80 - 105)  BP: --  BP(mean): --  RR: 25 (01 Jul 2021 08:00) (9 - 720)  SpO2: 100% (01 Jul 2021 08:00) (93% - 100%)                          8.4    11.27 )-----------( 377      ( 01 Jul 2021 01:08 )             27.4    07-01    139  |  101  |  8   ----------------------------<  131<H>  3.5   |  25  |  0.58    Ca    8.6      01 Jul 2021 01:10  Phos  2.3     07-01  Mg     1.9     07-01    TPro  7.1  /  Alb  3.6  /  TBili  0.4  /  DBili  x   /  AST  23  /  ALT  19  /  AlkPhos  139<H>  07-01   PTT - ( 01 Jul 2021 01:59 )  PTT:47.7 sec    PHYSICAL EXAM:  Gen: NAD  Skin: No rashes, bruises, or lesions  Head: Normocephalic, Atraumatic  Face: no edema, erythema, or fluctuance. Parotid glands soft without mass  Eyes: no scleral injection  Ears: Right - ear canal clear, TM intact without effusion or erythema. No evidence of any fluid drainage. No mastoid tenderness, erythema, or ear bulging            Left - ear canal clear, TM intact without effusion or erythema. No evidence of any fluid drainage. No mastoid tenderness, erythema, or ear bulging  Nose: Nares bilaterally patent, no discharge  Mouth: No Stridor / Drooling / Trismus.  Mucosa moist, tongue/uvula midline, oropharynx clear  Neck: Flat, supple, no lymphadenopathy, trachea midline, no masses  Lymphatic: No lymphadenopathy  Resp: breathing easily, no stridor  Neuro: facial nerve intact, no facial droop         ENT ISSUE/POD: s/p tracheostomy POD6    HPI:  66 YO M with significant PMHx, admitted for weakness and fatigue, was found to have CAD S/P CABG on 6/18. ENT called to evaluate for tracheostomy. Pt failed weaning trials and is not a candidate for extubation as per primary team. Now S/P # 8 DCT Tracheostomy POD #6. Doing well on the ventilator.      PAST MEDICAL & SURGICAL HISTORY:  CHF (congestive heart failure)    CAD (coronary artery disease)    Depression    Pleural effusion    History of 2019 novel coronavirus disease (COVID-19)  april 2020    Hemorrhoids    Bleeding hemorrhoids    Peripheral arterial disease    Claudication    BPH with urinary obstruction    ACC/AHA stage D systolic heart failure    Anticoagulation goal of INR 2.0 to 2.5    Falls    Clavicle fracture    CKD (chronic kidney disease), stage III    Iron deficiency anemia    H/O epistaxis    Vertigo    GI bleed    S/P TVR (tricuspid valve repair)    S/P ventricular assist device    S/P endoscopy      Allergies    No Known Allergies    Intolerances      MEDICATIONS  (STANDING):  aMIOdarone    Tablet 200 milliGRAM(s) Oral daily  chlorhexidine 0.12% Liquid 15 milliLiter(s) Oral Mucosa every 12 hours  chlorhexidine 2% Cloths 1 Application(s) Topical <User Schedule>  clonazePAM  Tablet 0.5 milliGRAM(s) Oral every 8 hours  dexMEDEtomidine Infusion 0.5 MICROgram(s)/kG/Hr (9.81 mL/Hr) IV Continuous <Continuous>  heparin  Infusion 400 Unit(s)/Hr (12.5 mL/Hr) IV Continuous <Continuous>  insulin lispro (ADMELOG) corrective regimen sliding scale   SubCutaneous every 6 hours  metroNIDAZOLE  IVPB 500 milliGRAM(s) IV Intermittent every 8 hours  pantoprazole  Injectable 40 milliGRAM(s) IV Push daily  sodium chloride 0.9%. 1000 milliLiter(s) (10 mL/Hr) IV Continuous <Continuous>  vancomycin    Solution 500 milliGRAM(s) Oral every 6 hours  vancomycin  IVPB 1000 milliGRAM(s) IV Intermittent every 12 hours    MEDICATIONS  (PRN):  acetaminophen    Suspension .. 650 milliGRAM(s) Oral every 6 hours PRN Temp greater or equal to 38C (100.4F)      ROS:   ENT: all negative except as noted in HPI   Pulm: denies SOB, cough, hemoptysis  Neuro: denies numbness/tingling, loss of sensation  Endo: denies heat/cold intolerance, excessive sweating      Vital Signs Last 24 Hrs  T(C): 37.3 (01 Jul 2021 08:00), Max: 37.7 (01 Jul 2021 04:00)  T(F): 99.1 (01 Jul 2021 08:00), Max: 99.9 (01 Jul 2021 04:00)  HR: 105 (01 Jul 2021 08:00) (80 - 105)  BP: --  BP(mean): --  RR: 25 (01 Jul 2021 08:00) (9 - 720)  SpO2: 100% (01 Jul 2021 08:00) (93% - 100%)                          8.4    11.27 )-----------( 377      ( 01 Jul 2021 01:08 )             27.4    07-01    139  |  101  |  8   ----------------------------<  131<H>  3.5   |  25  |  0.58    Ca    8.6      01 Jul 2021 01:10  Phos  2.3     07-01  Mg     1.9     07-01    TPro  7.1  /  Alb  3.6  /  TBili  0.4  /  DBili  x   /  AST  23  /  ALT  19  /  AlkPhos  139<H>  07-01   PTT - ( 01 Jul 2021 01:59 )  PTT:47.7 sec    PHYSICAL EXAM:  Gen: NAD, On Vent.   Skin: No rashes, bruises, or lesions.  Head: Normocephalic, Atraumatic.  Face: no edema, erythema, or fluctuance. Parotid glands soft without mass.  Eyes: no scleral injection.  Nose: Nares bilaterally patent, no discharge  Mouth: No Stridor / Drooling / Trismus.  Mucosa moist, tongue/uvula midline, oropharynx clear  Neck: # 8 DCT Trach secured with sutures x 4 and Umbilical Tie. Minimal oozing of serosanguinous secretions,.  No bleeding noted. No hematoma/crepitus. Flat, supple, no lymphadenopathy, trachea midline, no masses.  Lymphatic: No lymphadenopathy  Resp: On Vent.   Neuro: Unable to obtain.

## 2021-07-01 NOTE — PROGRESS NOTE ADULT - PROBLEM SELECTOR PLAN 2
- Current speed 9000 RPM to better unload ventricle. TTE reviewed w/ some MR and large LV w/ AV opening every beat; will consider incr speed again  - remains euvolemic/dry on exam today.  - c/w furosemide 40mg IV BID  - c/w heparin with target of lower PTT goal at this time. Eventual resumption of warfarin   - Will plan to hold aspirin indefinitely given GIB  - Continue to monitor LDH daily.  - Potassium 3.6 potassium given rechecking K+ this am - Current speed increased 9200 RPM to better unload ventricle.   - TTE reviewed w/ some MR and large LV w/ AV opening every beat; will consider incr speed again  - remains euvolemic/dry on exam today. No further diuretics.   - c/w heparin with target of lower PTT goal at this time. Eventual resumption of warfarin   - Will plan to hold aspirin indefinitely given GIB  - Continue to monitor LDH daily.

## 2021-07-01 NOTE — PROGRESS NOTE ADULT - SUBJECTIVE AND OBJECTIVE BOX
For all Cardiology service contact information, go to [email or web address] and use "[email or web address]" to login.    SUBJECTIVE:   Tele:   Afebrile   -------------------------------------------------------------------------------------------  ROS:  CV: chest pain (-), palpitation (-), orthopnea (-), PND (-), edema (-)  PULM: SOB (-), cough (-), wheezing (-), hemoptysis (-).   CONST: fever (-), chills (-) or fatigue (-)  GI: abdominal distension (-), abdominal pain (-) , nausea/vomiting (-), hematemesis, (-), melena (-), hematochezia (-)  : dysuria (-), frequency (-), hematuria (-).   NEURO: numbness (-), weakness (-), dizziness (-)  SKIN: itching (-), rash (-)  HEENT:  visual changes (-); vertigo or throat pain (-);  neck stiffness (-)     All other review of systems is negative unless indicated above.   -------------------------------------------------------------------------------------------  VS:  T(F): 99.1 (07-01), Max: 99.9 (07-01)  HR: 105 (07-01) (80 - 105)  BP: --  RR: 25 (07-01)  SpO2: 100% (07-01)  I&O's Summary    30 Jun 2021 07:01  -  01 Jul 2021 07:00  --------------------------------------------------------  IN: 3366.2 mL / OUT: 3335 mL / NET: 31.2 mL    01 Jul 2021 07:01  -  01 Jul 2021 08:44  --------------------------------------------------------  IN: 52.5 mL / OUT: 60 mL / NET: -7.5 mL      ------------------------------------------------------------------------------------------  PHYSICAL EXAM:  GENERAL: NAD  HEAD:  Atraumatic, Normocephalic.  EYES: EOMI, PERRLA, conjunctiva and sclera clear.  ENT: Moist mucous membranes.  NECK: Supple, No JVD.  CHEST/LUNG: Clear to auscultation bilaterally; No rales, rhonchi, wheezing, or rubs. Unlabored respirations.  HEART: Regular rate and rhythm; No murmurs, rubs, or gallops.  ABDOMEN: Bowel sounds present; Soft, Nontender, Nondistended.   EXTREMITIES:  2+ Peripheral Pulses, brisk capillary refill. No clubbing, cyanosis, or edema.  PSYCH: Normal affect.  SKIN: No rashes or lesions.  -------------------------------------------------------------------------------------------  LABS:                          8.4    11.27 )-----------( 377      ( 01 Jul 2021 01:08 )             27.4     07-01    139  |  101  |  8   ----------------------------<  131<H>  3.5   |  25  |  0.58    Ca    8.6      01 Jul 2021 01:10  Phos  2.3     07-01  Mg     1.9     07-01    TPro  7.1  /  Alb  3.6  /  TBili  0.4  /  DBili  x   /  AST  23  /  ALT  19  /  AlkPhos  139<H>  07-01    PTT - ( 01 Jul 2021 01:59 )  PTT:47.7 sec            -------------------------------------------------------------------------------------------  Meds:  acetaminophen    Suspension .. 650 milliGRAM(s) Oral every 6 hours PRN  aMIOdarone    Tablet 200 milliGRAM(s) Oral daily  chlorhexidine 0.12% Liquid 15 milliLiter(s) Oral Mucosa every 12 hours  chlorhexidine 2% Cloths 1 Application(s) Topical <User Schedule>  clonazePAM  Tablet 0.5 milliGRAM(s) Oral every 8 hours  dexMEDEtomidine Infusion 0.5 MICROgram(s)/kG/Hr IV Continuous <Continuous>  heparin  Infusion 400 Unit(s)/Hr IV Continuous <Continuous>  insulin lispro (ADMELOG) corrective regimen sliding scale   SubCutaneous every 6 hours  metroNIDAZOLE  IVPB 500 milliGRAM(s) IV Intermittent every 8 hours  pantoprazole  Injectable 40 milliGRAM(s) IV Push daily  sodium chloride 0.9%. 1000 milliLiter(s) IV Continuous <Continuous>  vancomycin    Solution 500 milliGRAM(s) Oral every 6 hours  vancomycin  IVPB 1000 milliGRAM(s) IV Intermittent every 12 hours    -------------------------------------------------------------------------------------------     For all Cardiology service contact information, go to [email or web address] and use "[email or web address]" to login.    SUBJECTIVE:   Tele:   Afebrile   Received IV lasix 40, after which had several low flow alarms on LVAD, which resolved after volume expansion with albumin  increased trach/oral secretions noted by nurse  -------------------------------------------------------------------------------------------  ROS:  CV: chest pain (-), palpitation (-), orthopnea (-), PND (-), edema (-)  PULM: SOB (-), cough (-), wheezing (-), hemoptysis (-).   CONST: fever (-), chills (-) or fatigue (-)  GI: abdominal distension (-), abdominal pain (-) , nausea/vomiting (-), hematemesis, (-), melena (-), hematochezia (-)  : dysuria (-), frequency (-), hematuria (-).   NEURO: numbness (-), weakness (-), dizziness (-)  SKIN: itching (-), rash (-)  HEENT:  visual changes (-); vertigo or throat pain (-);  neck stiffness (-)     All other review of systems is negative unless indicated above.   -------------------------------------------------------------------------------------------  VS:  T(F): 99.1 (07-01), Max: 99.9 (07-01)  HR: 105 (07-01) (80 - 105)  BP: --  RR: 25 (07-01)  SpO2: 100% (07-01)  I&O's Summary    30 Jun 2021 07:01  -  01 Jul 2021 07:00  --------------------------------------------------------  IN: 3366.2 mL / OUT: 3335 mL / NET: 31.2 mL    01 Jul 2021 07:01  -  01 Jul 2021 08:44  --------------------------------------------------------  IN: 52.5 mL / OUT: 60 mL / NET: -7.5 mL      ------------------------------------------------------------------------------------------  PHYSICAL EXAM:  Gen: NAD.  HEENT: NCAT. NGT in place. Trach in place.  Neck: No JVP elev.  CV: Normal S1, S2. RRR. No MRG.  Chest: CTAB. No WRR.  Abd: +BSx4. Soft. NTND.  Ext: No LE edema. Mittens in place.  Skin: No cyanosis.    LVAD Interrogation (7/1/2021):  Pump Speed: 9200  Pump Flow: 4.7  Pulse Index: 5.2  Pump Power: 5.3  VAD Events: 2 low flow alarms noted 6/30  Driveline evaluation: CDI  Programming Changes: No changes made    -------------------------------------------------------------------------------------------  LABS:                          8.4    11.27 )-----------( 377      ( 01 Jul 2021 01:08 )             27.4     07-01    139  |  101  |  8   ----------------------------<  131<H>  3.5   |  25  |  0.58    Ca    8.6      01 Jul 2021 01:10  Phos  2.3     07-01  Mg     1.9     07-01    TPro  7.1  /  Alb  3.6  /  TBili  0.4  /  DBili  x   /  AST  23  /  ALT  19  /  AlkPhos  139<H>  07-01    PTT - ( 01 Jul 2021 01:59 )  PTT:47.7 sec            -------------------------------------------------------------------------------------------  Meds:  acetaminophen    Suspension .. 650 milliGRAM(s) Oral every 6 hours PRN  aMIOdarone    Tablet 200 milliGRAM(s) Oral daily  chlorhexidine 0.12% Liquid 15 milliLiter(s) Oral Mucosa every 12 hours  chlorhexidine 2% Cloths 1 Application(s) Topical <User Schedule>  clonazePAM  Tablet 0.5 milliGRAM(s) Oral every 8 hours  dexMEDEtomidine Infusion 0.5 MICROgram(s)/kG/Hr IV Continuous <Continuous>  heparin  Infusion 400 Unit(s)/Hr IV Continuous <Continuous>  insulin lispro (ADMELOG) corrective regimen sliding scale   SubCutaneous every 6 hours  metroNIDAZOLE  IVPB 500 milliGRAM(s) IV Intermittent every 8 hours  pantoprazole  Injectable 40 milliGRAM(s) IV Push daily  sodium chloride 0.9%. 1000 milliLiter(s) IV Continuous <Continuous>  vancomycin    Solution 500 milliGRAM(s) Oral every 6 hours  vancomycin  IVPB 1000 milliGRAM(s) IV Intermittent every 12 hours    -------------------------------------------------------------------------------------------

## 2021-07-01 NOTE — PROGRESS NOTE ADULT - SUBJECTIVE AND OBJECTIVE BOX
CRITICAL CARE ATTENDING - CTICU    MEDICATIONS  (STANDING):  aMIOdarone    Tablet 200 milliGRAM(s) Oral daily  chlorhexidine 0.12% Liquid 15 milliLiter(s) Oral Mucosa every 12 hours  chlorhexidine 2% Cloths 1 Application(s) Topical <User Schedule>  clonazePAM  Tablet 0.5 milliGRAM(s) Oral every 8 hours  dexMEDEtomidine Infusion 0.5 MICROgram(s)/kG/Hr (9.81 mL/Hr) IV Continuous <Continuous>  heparin  Infusion 400 Unit(s)/Hr (12.5 mL/Hr) IV Continuous <Continuous>  insulin lispro (ADMELOG) corrective regimen sliding scale   SubCutaneous every 6 hours  metroNIDAZOLE  IVPB 500 milliGRAM(s) IV Intermittent every 8 hours  pantoprazole  Injectable 40 milliGRAM(s) IV Push daily  sodium chloride 0.9%. 1000 milliLiter(s) (10 mL/Hr) IV Continuous <Continuous>  vancomycin    Solution 500 milliGRAM(s) Oral every 6 hours  vancomycin  IVPB 1000 milliGRAM(s) IV Intermittent every 12 hours                                    8.4    11.27 )-----------( 377      ( 2021 01:08 )             27.4           139  |  101  |  8   ----------------------------<  131<H>  3.5   |  25  |  0.58    Ca    8.6      2021 01:10  Phos  2.3       Mg     1.9         TPro  7.1  /  Alb  3.6  /  TBili  0.4  /  DBili  x   /  AST  23  /  ALT  19  /  AlkPhos  139<H>        PTT - ( 2021 01:59 )  PTT:47.7 sec    Mode: off      Daily     Daily Weight in k (2021 00:00)      06-30 @ : @ 07:00  --------------------------------------------------------  IN: 3366.2 mL / OUT: 3335 mL / NET: 31.2 mL     @ 07: @ 08:39  --------------------------------------------------------  IN: 52.5 mL / OUT: 60 mL / NET: -7.5 mL        Critically Ill patient  : [ ] preoperative ,   [ ] post operative   [x] Non Operative     Requires :  [x] Arterial Line   [x] Central Line  [ ] PA catheter  [ ] IABP  [ ] ECMO  [x] LVAD  [x] Ventilator  [ ] pacemaker [ ] Impella.                      [x] ABG's     [x] Pulse Oxymetry Monitoring  Bedside evaluation , monitoring , treatment of hemodynamics , fluids , IVP/ IVCD meds.        Diagnosis:     Admitted to CTU on  for melena with anemia     POD 6 - Tracheostomy     LVAD patient     Ventilator Management:  [x]AC-rest    [x]CPAP-PS Wean    [x]Trach Collar     [ ]Extubate    [ ] T-Piece  [ ]peep>5     IVCD Precedex  for vent synchrony / agitation / delirium     Hypotension a/w Hypertension, requiring intravenous medication.     Hypovolemia    CHF- acute [ ]   chronic [ ]    systolic [ ]   diatolic [ ]          - Echo- EF - BiV            [ ] RV dysfunction          - Cxr-cardiomegally, edema          - Clinical-  [ ]inotropes   [ ]pressors   [ ]diuresis   [ ]IABP   [ ]ECMO   [x]LVAD   [x]Respiratory Failure.     Respiratory Failure - ARDS - resolving    Chronic Kidney Disease    Coagulopathy     Difficult weaning process - multiple organ system involvement in critically ill patient    Sepsis - L Pneumonia/ pneumonitis - resolving      IVCD anticoagulation with [x ] Heparin  [ ] Argatroban for LVAD circuit     Requires chest PT, pulmonary toilet, ambu bagging, suctioning to maintain SaO2,  patent airway and treat atelectasis.     Requires bedside physical therapy, mobilization and total jail care.     Agitation / Delirium           I, Luis Fernando Thompson, personally performed the services described in this documentation. All medical record entries made by the scribe were at my direction and in my presence. I have reviewed the chart and agree that the record reflects my personal performance and is accurate and complete.   Luis Fernando Thompson MD.       By signing my name below, I, Brian Robin, attest that this documentation has been prepared under the direction and in the presence of Luis Fernando Thompson MD.   Electronically Signed: Cesia Gonzalez 21 @ 08:39        Discussed with CT surgeon, Physician Assistant - Nurse Practitioner- Critical care medicine team.   Dicussed at  AM / PM rounds.   Chart, labs , films reviewed.    Cumulative Critical Care Time Given Today:  CRITICAL CARE ATTENDING - CTICU    MEDICATIONS  (STANDING):  aMIOdarone    Tablet 200 milliGRAM(s) Oral daily  chlorhexidine 0.12% Liquid 15 milliLiter(s) Oral Mucosa every 12 hours  chlorhexidine 2% Cloths 1 Application(s) Topical <User Schedule>  clonazePAM  Tablet 0.5 milliGRAM(s) Oral every 8 hours  dexMEDEtomidine Infusion 0.5 MICROgram(s)/kG/Hr (9.81 mL/Hr) IV Continuous <Continuous>  heparin  Infusion 400 Unit(s)/Hr (12.5 mL/Hr) IV Continuous <Continuous>  insulin lispro (ADMELOG) corrective regimen sliding scale   SubCutaneous every 6 hours  metroNIDAZOLE  IVPB 500 milliGRAM(s) IV Intermittent every 8 hours  pantoprazole  Injectable 40 milliGRAM(s) IV Push daily  sodium chloride 0.9%. 1000 milliLiter(s) (10 mL/Hr) IV Continuous <Continuous>  vancomycin    Solution 500 milliGRAM(s) Oral every 6 hours  vancomycin  IVPB 1000 milliGRAM(s) IV Intermittent every 12 hours                                    8.4    11.27 )-----------( 377      ( 2021 01:08 )             27.4           139  |  101  |  8   ----------------------------<  131<H>  3.5   |  25  |  0.58    Ca    8.6      2021 01:10  Phos  2.3       Mg     1.9         TPro  7.1  /  Alb  3.6  /  TBili  0.4  /  DBili  x   /  AST  23  /  ALT  19  /  AlkPhos  139<H>        PTT - ( 2021 01:59 )  PTT:47.7 sec    Mode: off      Daily     Daily Weight in k (2021 00:00)      06-30 @ : @ 07:00  --------------------------------------------------------  IN: 3366.2 mL / OUT: 3335 mL / NET: 31.2 mL     @ 07: @ 08:39  --------------------------------------------------------  IN: 52.5 mL / OUT: 60 mL / NET: -7.5 mL        Critically Ill patient  : [ ] preoperative ,   [ ] post operative   [x] Non Operative     Requires :  [x] Arterial Line   [x] Central Line  [ ] PA catheter  [ ] IABP  [ ] ECMO  [x] LVAD  [x] Ventilator  [ ] pacemaker [ ] Impella.                      [x] ABG's     [x] Pulse Oxymetry Monitoring  Bedside evaluation , monitoring , treatment of hemodynamics , fluids , IVP/ IVCD meds.        Diagnosis:     Admitted to CTU on  for melena with anemia     POD 6 - Tracheostomy     LVAD patient     Ventilator Management:  [x]AC-rest    [x]CPAP-PS Wean    [x]Trach Collar     [ ]Extubate    [ ] T-Piece  [ ]peep>5     IVCD Precedex  for vent synchrony / agitation / delirium     Hypotension a/w Hypertension, requiring intravenous medication.     Hypovolemia    CHF- acute [ ]   chronic [x ]    systolic [x ]   diatolic [ ]          - Echo- EF - BiV            [ x] RV dysfunction          - Cxr-cardiomegally, edema          - Clinical-  [ ]inotropes   [ ]pressors   [ ]diuresis   [ ]IABP   [ ]ECMO   [x]LVAD   [x]Respiratory Failure.     Respiratory Failure - ARDS - resolving    Chronic Kidney Diseas    Difficult weaning process - multiple organ system involvement in critically ill patient    Sepsis - L Pneumonia/ pneumonitis - resolving      IVCD anticoagulation with [x ] Heparin  [ ] Argatroban for LVAD circuit     Requires chest PT, pulmonary toilet, ambu bagging, suctioning to maintain SaO2,  patent airway and treat atelectasis.     Requires bedside physical therapy, mobilization and total [facility] care.     Agitation / Delirium           I, Luis Fernando Thompson, personally performed the services described in this documentation. All medical record entries made by the scribe were at my direction and in my presence. I have reviewed the chart and agree that the record reflects my personal performance and is accurate and complete.   Luis Fernando Thompson MD.       By signing my name below, I, Brian Robin, attest that this documentation has been prepared under the direction and in the presence of Luis Fernando Thompson MD.   Electronically Signed: Cesia Gonzalez 21 @ 08:39        Discussed with CT surgeon, Physician Assistant - Nurse Practitioner- Critical care medicine team.   Dicussed at  AM / PM rounds.   Chart, labs , films reviewed.    Cumulative Critical Care Time Given Today:  30 min

## 2021-07-02 NOTE — PROGRESS NOTE ADULT - PROBLEM SELECTOR PLAN 3
-s/p trach 6/25; wean ventilator as tolerates.  -Sedation vacation and wean as per CTU.  -Continue tx VAP w/ cefepime; agree w/ ID input for abxs guidance.

## 2021-07-02 NOTE — PROGRESS NOTE ADULT - ASSESSMENT
67 YO M with a history of stage D NICM s/p HM2 on 9/2017 as DT (due to severe PAD) with TV ring, prior COVID-19 infection 4/2020, recurrent syncope post LVAD s/p ILR, and chronic abdominal pain with prior negative workup who was admitted with symptomatic anemia with Hgb 4.5 in setting of INR 8.8 without hemodynamic instability. He was transfused and underwent VCE which showed active bleeding in the mid small bowel but subsequent enteroscopy 6/15 did not reveal any active bleeding. He acutely decompensated after procedure with fever/hypertension, low flow alarms, and pulmonary infiltrate with hypoxia requiring intubation from probable aspiration PNA. His LDH is normal and there are no signs of overt LVAD dysfunction on echo though signs of insufficiently unloaded ventricle for which we increased speed on 6/19.    He remains on ventilator although CXR is improving. He also has acute Cdiff colitis and is currently being tx w/ vanco PO and metronidazole. There is concern for worsening VAP. He has been afebrile over the past 24 hours on broadened abx coverage with addition of cefepime. His leukocytosis is improving. He is hemodynamically stable although with low CVP of 2 today and low albumin. His Hgb is stable.  His LDH is stable and he is without s/s of LVAD pump malfunction.

## 2021-07-02 NOTE — PROGRESS NOTE ADULT - SUBJECTIVE AND OBJECTIVE BOX

## 2021-07-02 NOTE — PROGRESS NOTE ADULT - ASSESSMENT
64 YO M with significant PMHx S/P CABG complicated by respiratory failure. Now S/P # 8 DCT Tracheostomy POD #7 66 YO M with significant PMHx S/P CABG complicated by respiratory failure. Now S/P # 8 DCT Tracheostomy POD #7, sutures removed and umbilical tie changed to velcro tie

## 2021-07-02 NOTE — PROGRESS NOTE ADULT - PROBLEM SELECTOR PLAN 1
- Keep the Trach site clean and dry.   - When on vent, place Omniflex to avoid the manipulation of the stoma.   - Elevate HOB.   - Any trach issues please reconsult ENT [id number].

## 2021-07-02 NOTE — PROGRESS NOTE ADULT - PROBLEM SELECTOR PLAN 2
-Current speed 9200 RPM to better unload ventricle.   -TTE reviewed w/ some MR and large LV w/ AV opening every beat; will consider incr speed again  -Remains euvolemic/dry on exam today. No further diuretics.   - c/w heparin with target of lower PTT goal at this time. Eventual resumption of warfarin.   -Will plan to hold aspirin indefinitely given GIB.  -Continue to monitor LDH daily.

## 2021-07-02 NOTE — PROGRESS NOTE ADULT - PROBLEM SELECTOR PLAN 5
-Continue w/ abxs for VAP tx per ID. CXR with LLL pneumonia. Will go for U/S lung at bedside today with potential bronchoscopy.  -C/w tx acute Cdiff, as above.  -One set of bcx w/ Staph lugdunensis. F/u ID for significance vs contamination. Await repeat cxs.

## 2021-07-02 NOTE — PROGRESS NOTE ADULT - ASSESSMENT
imp/rx:  Suspected aspiration event at time of Endoscopic procedure with subsequent development of C.diff diarrhea  Now with increasing leukocytosis and gram + cocci in clusters from blood culture sent 6/26 with organism ID/sensitivity pending  Started IV Vancomycin  Vanco CLAU=4 is borderline  Will change antibiotics to Cefazolin  Would change CVC lines as feasible   repeat blood cultures 6/29 x2 sets are NGTD  Patient's diarrhea improved with holding his feeds- he is also c/o nausea      Clostridiodes difficile :  Abdomen somewhat distended- but soft- loose stool noted this afternoon  Continue Vanco oral 500mg qid  Will discontinue the flagyl as it may be contributing to the nausea and diarrhea improving    Staph lugdenesis- c;eared quickly will treat for a short course with cefazolin x 7days      Morris Prater MD  [phone]  After 5pm/weekends [phone]

## 2021-07-02 NOTE — CHART NOTE - NSCHARTNOTEFT_GEN_A_CORE
Seen for: nutrition follow up   Source: patient, RN, NP, EMR, HF rounds    Chart reviewed, events noted. 65 year old male with PMHx of NICM, s/p  HM2 LVAD in , on coumadin, CKD 3 presents with anemia, dark stools and supra therapeutic INR. S/p capsule study, endoscopy. Course c/b possible aspiration event. C-Diff +, being treated with vancomycin. S/p tracheostomy on . Intermittently on trach collar    Diet Order:   Diet, NPO with Tube Feed:   Tube Feeding Modality: Nasogastric  Vital AF (VITALAFRTH)  Total Volume for 24 Hours (mL): 1440  Continuous  Starting Tube Feed Rate {mL per Hour}: 10  Increase Tube Feed Rate by (mL): 10     Every 6 hours  Until Goal Tube Feed Rate (mL per Hour): 60  Tube Feed Duration (in Hours): 24  Tube Feed Start Time: 08:30  Banatrol TF     Qty per Day:  2  Supplement Feeding Modality:  Nasogastric  Probiotic Yogurt/Smoothie Cans or Servings Per Day:  2       Frequency:  Daily (21)    EN Order Provides: 1728kcals and 108gm protein  - 22kcals/kg and 1.37gm pro/kg based on dosing weight: 78.5kg    EN provision per chart:  () held, plan to resume  () 25% of goal  () 71% of goal   () 100% of goal  () 65% of goal   - On average meeting 65% of needs x4 days    GI:  Last BM .   Bowel Regimen: none at this time  - C. Diff +, 5 BMs yesterday, none overnight  - Episode of emesis on , yesterday concern for feeds in secretions  - Per NP/rounds, plan to resume tube feeds and slowly increase as tolerated towards goal rate    Weights:   Daily Weight in k (), Weight in k (), Weight in k.5 (), Weight in k.4 (), Weight in k.6 (), Weight in k.5 (), Weight in k ()    MEDICATIONS  (STANDING):  aMIOdarone    Tablet  ceFAZolin   IVPB  insulin lispro (ADMELOG) corrective regimen sliding scale  pantoprazole  Injectable  sodium chloride 0.9%.  vancomycin    Solution    Pertinent Labs:  @ 00:09: Na 134<L>, BUN 6<L>, Cr 0.62, <H>, K+ 4.2, Phos 2.8, Mg 2.0, Alk Phos 135<H>, ALT/SGPT 20, AST/SGOT 29, HbA1c --    A1C with Estimated Average Glucose Result: 5.6 % (06-15-21 @ 02:42)    Finger Sticks:  POCT Blood Glucose.: 119 mg/dL ( @ 17:14)  POCT Blood Glucose.: 146 mg/dL ( @ 11:04)    Triglycerides, Serum: 145 mg/dL (21 @ 00:53)  Triglycerides, Serum: 191 mg/dL (21 @ 00:36)  Triglycerides, Serum: 183 mg/dL (21 @ 00:43)  Triglycerides, Serum: 518 mg/dL (21 @ 09:02)    Skin per chart: IAD, no pressure injuries  Edema per chart: none noted    Estimated Nutrition Needs:   - Using dosing weight 78.5 kg, considering vent support  - Energy Needs (20-25 kcal/kg): 2499-3996 kcal/day  - Protein Needs (1.2-1.4 g/kg):     Previous Nutrition Diagnosis:  Acute moderate malnutrition  Nutrition diagnosis is ongoing, being addressed with tube feeds as tolerated    New Nutrition Diagnosis: n/a       Recommendations:      1) Consider post-pyloric tube feeding to promote GI tolerance  2) Resume tube feeds at 10ml/hr and increase slowly as tolerated towards goal rate of Vital AF @ 60 ml/hr x 24 hours. Meets estimated nutrient needs at goal rate.  3) Continue Probiotic smoothie and Banatrol to promote GI tolerance of feeds    Continue monitoring and evaluating:   - Labs: blood glucose, electrolytes, renal indices  - GI function and bowel movements  - Nutritional intake, weight trends, skin integrity    RD remains available PRN and will follow up per protocol.   Kandice Peañ RD, CDN, [facility]   Pager # [phone]

## 2021-07-02 NOTE — PROGRESS NOTE ADULT - SUBJECTIVE AND OBJECTIVE BOX
INFECTIOUS DISEASES FOLLOW UP-- Anitra Prater  [phone]    This is a follow up note for this  66yMale with  Anemia  C.diff diarrhea  weaning from vent        ROS:  CONSTITUTIONAL:  No fever,   CARDIOVASCULAR:  No chest pain or palpitations  RESPIRATORY:  No dyspnea  GASTROINTESTINAL:  No nausea, vomiting,  some diarrhea, or abdominal pain  GENITOURINARY:  No dysuria  NEUROLOGIC:  No headache,     Allergies    No Known Allergies    Intolerances        ANTIBIOTICS/RELEVANT:  antimicrobials  ceFAZolin   IVPB 2000 milliGRAM(s) IV Intermittent every 8 hours  vancomycin    Solution 500 milliGRAM(s) Oral every 6 hours    immunologic:    OTHER:  acetaminophen    Suspension .. 650 milliGRAM(s) Oral every 6 hours PRN  aMIOdarone    Tablet 200 milliGRAM(s) Oral daily  chlorhexidine 0.12% Liquid 15 milliLiter(s) Oral Mucosa every 12 hours  chlorhexidine 2% Cloths 1 Application(s) Topical <User Schedule>  clonazePAM  Tablet 0.5 milliGRAM(s) Oral at bedtime  heparin  Infusion 400 Unit(s)/Hr IV Continuous <Continuous>  hydrALAZINE 10 milliGRAM(s) Oral every 8 hours PRN  insulin lispro (ADMELOG) corrective regimen sliding scale   SubCutaneous every 6 hours  pantoprazole  Injectable 40 milliGRAM(s) IV Push daily  scopolamine 1 mG/72 Hr(s) Patch 1 Patch Transdermal every 72 hours  sodium chloride 0.9%. 1000 milliLiter(s) IV Continuous <Continuous>  warfarin 3 milliGRAM(s) Oral once      Objective:  Vital Signs Last 24 Hrs  T(C): 37.3 (02 Jul 2021 16:00), Max: 37.4 (02 Jul 2021 00:00)  T(F): 99.1 (02 Jul 2021 16:00), Max: 99.3 (02 Jul 2021 00:00)  HR: 91 (02 Jul 2021 17:00) (84 - 109)  BP: --  BP(mean): --  RR: 18 (02 Jul 2021 17:00) (11 - 41)  SpO2: 98% (02 Jul 2021 17:00) (95% - 100%)    PHYSICAL EXAM:  Constitutional:no acute distress  Eyes:ALONSO, EOMI  Ear/Nose/Throat: no oral lesions, 	  Respiratory: clear BL  trach collar tolerating weaning  Cardiovascular: S1S2  VAD sounds  Gastrointestinal:soft, (+) BS, no tenderness  VAD exit site dressing CDI  Extremities:no e/e/c  No Lymphadenopathy  IV sites not inflammed.    LABS:                        9.1    13.49 )-----------( 397      ( 02 Jul 2021 00:09 )             30.2     07-02    134<L>  |  98  |  6<L>  ----------------------------<  103<H>  4.2   |  25  |  0.62    Ca    9.3      02 Jul 2021 00:09  Phos  2.8     07-02  Mg     2.0     07-02    TPro  7.5  /  Alb  3.5  /  TBili  0.4  /  DBili  x   /  AST  29  /  ALT  20  /  AlkPhos  135<H>  07-02    PT/INR - ( 02 Jul 2021 13:02 )   PT: 15.8 sec;   INR: 1.33 ratio         PTT - ( 02 Jul 2021 13:02 )  PTT:43.7 sec      MICROBIOLOGY:            RECENT CULTURES:  06-30 @ 20:57  .Sputum Sputum  --  --  --    Normal Respiratory Bria present  --  06-29 @ 17:31  .Blood Blood-Peripheral  --  --  --    No growth to date.  --  06-26 @ 06:53  .Blood Blood-Peripheral  Staphylococcus lugdunensis  Staphylococcus lugdunensis  CLAU    No Growth Final  --      RADIOLOGY & ADDITIONAL STUDIES:    < from: Xray Chest 1 View- PORTABLE-Urgent (Xray Chest 1 View- PORTABLE-Urgent .) (07.02.21 @ 05:00) >    IMPRESSION:    Enteric tube terminates in the stomach. Basilar atelectasis unchanged.    < end of copied text >

## 2021-07-02 NOTE — PROGRESS NOTE ADULT - SUBJECTIVE AND OBJECTIVE BOX
Daily In-House Cardiology Progress Note  -------------------------------------------------------    24-Hour Events/Subjective:      -Secretions from trach collar is improving.  -OOB into chair. More awake and interactive today.  -Central line removed.    Current Meds:  acetaminophen    Suspension .. 650 milliGRAM(s) Oral every 6 hours PRN  aMIOdarone    Tablet 200 milliGRAM(s) Oral daily  ceFAZolin   IVPB 2000 milliGRAM(s) IV Intermittent every 8 hours  chlorhexidine 0.12% Liquid 15 milliLiter(s) Oral Mucosa every 12 hours  chlorhexidine 2% Cloths 1 Application(s) Topical <User Schedule>  clonazePAM  Tablet 0.5 milliGRAM(s) Oral at bedtime  heparin  Infusion 400 Unit(s)/Hr IV Continuous <Continuous>  hydrALAZINE 10 milliGRAM(s) Oral every 8 hours PRN  insulin lispro (ADMELOG) corrective regimen sliding scale   SubCutaneous every 6 hours  pantoprazole  Injectable 40 milliGRAM(s) IV Push daily  scopolamine 1 mG/72 Hr(s) Patch 1 Patch Transdermal every 72 hours  sodium chloride 0.9%. 1000 milliLiter(s) IV Continuous <Continuous>  vancomycin    Solution 500 milliGRAM(s) Oral every 6 hours    Vitals:  T(F): 99.3 (07-02), Max: 99.3 (07-01)  HR: 94 (07-02) (84 - 108)  BP: --  RR: 21 (07-02)  SpO2: 99% (07-02)  I&O's Summary    01 Jul 2021 07:01  -  02 Jul 2021 07:00  --------------------------------------------------------  IN: 1012.5 mL / OUT: 1725 mL / NET: -712.5 mL    02 Jul 2021 07:01  -  02 Jul 2021 13:02  --------------------------------------------------------  IN: 100 mL / OUT: 285 mL / NET: -185 mL    Physical Exam:  Gen: NAD.  HEENT: NCAT. NGT in place. Trach in place.  Neck: No JVP elevation.  CV: Normal S1, S2. RRR. No MRG.  Chest: CTAB. No WRR.  Abd: +BSx4. Soft. NTND.  Ext: No LE edema. Mittens in place.  Skin: No cyanosis.                        9.1    13.49 )-----------( 397      ( 02 Jul 2021 00:09 )             30.2     07-02    134<L>  |  98  |  6<L>  ----------------------------<  103<H>  4.2   |  25  |  0.62    Ca    9.3      02 Jul 2021 00:09  Phos  2.8     07-02  Mg     2.0     07-02    TPro  7.5  /  Alb  3.5  /  TBili  0.4  /  DBili  x   /  AST  29  /  ALT  20  /  AlkPhos  135<H>  07-02    PTT - ( 02 Jul 2021 00:10 )  PTT:58.5 sec           Daily In-House Cardiology Progress Note  -------------------------------------------------------    24-Hour Events/Subjective:      -Secretions from trach collar is improving.  -OOB into chair. More awake and interactive today.  -Central line removed.    Current Meds:  acetaminophen    Suspension .. 650 milliGRAM(s) Oral every 6 hours PRN  aMIOdarone    Tablet 200 milliGRAM(s) Oral daily  ceFAZolin   IVPB 2000 milliGRAM(s) IV Intermittent every 8 hours  chlorhexidine 0.12% Liquid 15 milliLiter(s) Oral Mucosa every 12 hours  chlorhexidine 2% Cloths 1 Application(s) Topical <User Schedule>  clonazePAM  Tablet 0.5 milliGRAM(s) Oral at bedtime  heparin  Infusion 400 Unit(s)/Hr IV Continuous <Continuous>  hydrALAZINE 10 milliGRAM(s) Oral every 8 hours PRN  insulin lispro (ADMELOG) corrective regimen sliding scale   SubCutaneous every 6 hours  pantoprazole  Injectable 40 milliGRAM(s) IV Push daily  scopolamine 1 mG/72 Hr(s) Patch 1 Patch Transdermal every 72 hours  sodium chloride 0.9%. 1000 milliLiter(s) IV Continuous <Continuous>  vancomycin    Solution 500 milliGRAM(s) Oral every 6 hours    Vitals:  T(F): 99.3 (07-02), Max: 99.3 (07-01)  HR: 94 (07-02) (84 - 108)  BP: --  RR: 21 (07-02)  SpO2: 99% (07-02)    I&O's Summary  01 Jul 2021 07:01  -  02 Jul 2021 07:00  --------------------------------------------------------  IN: 1012.5 mL / OUT: 1725 mL / NET: -712.5 mL    02 Jul 2021 07:01  -  02 Jul 2021 13:02  --------------------------------------------------------  IN: 100 mL / OUT: 285 mL / NET: -185 mL    LVAD interrogation 7/2  Flow: 4.6L/min  Speed: 9200 RPM  PI: 5.3   Power: 5.3 gallegos   Alarms: None  Changes to device programming: None.    Physical Exam:  Gen: NAD.  HEENT: NCAT. NGT in place. Trach in place.  Neck: No JVP elevation.  CV: Normal S1, S2. RRR. No MRG.  Chest: CTAB. No WRR.  Abd: +BSx4. Soft. NTND.  Ext: No LE edema. Mittens in place.  Skin: No cyanosis.                        9.1    13.49 )-----------( 397      ( 02 Jul 2021 00:09 )             30.2     07-02    134<L>  |  98  |  6<L>  ----------------------------<  103<H>  4.2   |  25  |  0.62    Ca    9.3      02 Jul 2021 00:09  Phos  2.8     07-02  Mg     2.0     07-02    TPro  7.5  /  Alb  3.5  /  TBili  0.4  /  DBili  x   /  AST  29  /  ALT  20  /  AlkPhos  135<H>  07-02    PTT - ( 02 Jul 2021 00:10 )  PTT:58.5 sec           Daily In-House Cardiology Progress Note  -------------------------------------------------------    24-Hour Events/Subjective:      -Secretions from trach collar is improving.  -OOB into chair. More awake and interactive today.  -Central line removed.    Current Meds:  acetaminophen    Suspension .. 650 milliGRAM(s) Oral every 6 hours PRN  aMIOdarone    Tablet 200 milliGRAM(s) Oral daily  ceFAZolin   IVPB 2000 milliGRAM(s) IV Intermittent every 8 hours  chlorhexidine 0.12% Liquid 15 milliLiter(s) Oral Mucosa every 12 hours  chlorhexidine 2% Cloths 1 Application(s) Topical <User Schedule>  clonazePAM  Tablet 0.5 milliGRAM(s) Oral at bedtime  heparin  Infusion 400 Unit(s)/Hr IV Continuous <Continuous>  hydrALAZINE 10 milliGRAM(s) Oral every 8 hours PRN  insulin lispro (ADMELOG) corrective regimen sliding scale   SubCutaneous every 6 hours  pantoprazole  Injectable 40 milliGRAM(s) IV Push daily  scopolamine 1 mG/72 Hr(s) Patch 1 Patch Transdermal every 72 hours  sodium chloride 0.9%. 1000 milliLiter(s) IV Continuous <Continuous>  vancomycin    Solution 500 milliGRAM(s) Oral every 6 hours    Vitals:  T(F): 99.3 (07-02), Max: 99.3 (07-01)  HR: 94 (07-02) (84 - 108)  BP: --  RR: 21 (07-02)  SpO2: 99% (07-02)    I&O's Summary  01 Jul 2021 07:01  -  02 Jul 2021 07:00  --------------------------------------------------------  IN: 1012.5 mL / OUT: 1725 mL / NET: -712.5 mL    02 Jul 2021 07:01  -  02 Jul 2021 13:02  --------------------------------------------------------  IN: 100 mL / OUT: 285 mL / NET: -185 mL    LVAD interrogation 7/2  Flow: 4.6L/min  Speed: 9200 RPM  PI: 5.3   Power: 5.3 gallegos   Alarms/Events: Several PI events, a few low flow alarms overnight, now resolved  Changes to device programming: None.    Physical Exam:  Gen: NAD.  HEENT: NCAT. NGT in place. Trach in place.  Neck: No JVP elevation.  CV: Normal S1, S2. RRR. No MRG.  Chest: CTAB. No WRR.  Abd: +BSx4. Soft. NTND.  Ext: No LE edema. Mittens in place.  Skin: No cyanosis.                        9.1    13.49 )-----------( 397      ( 02 Jul 2021 00:09 )             30.2     07-02    134<L>  |  98  |  6<L>  ----------------------------<  103<H>  4.2   |  25  |  0.62    Ca    9.3      02 Jul 2021 00:09  Phos  2.8     07-02  Mg     2.0     07-02    TPro  7.5  /  Alb  3.5  /  TBili  0.4  /  DBili  x   /  AST  29  /  ALT  20  /  AlkPhos  135<H>  07-02    PTT - ( 02 Jul 2021 00:10 )  PTT:58.5 sec

## 2021-07-02 NOTE — PROGRESS NOTE ADULT - PROBLEM SELECTOR PLAN 6
-No further active blood loss since correction of INR and no active bleeding seen on enteroscopy.  -Continue to trend CBC closely as anticoagulation re-initiated.  -Continue pantop IV bid.

## 2021-07-02 NOTE — PROGRESS NOTE ADULT - SUBJECTIVE AND OBJECTIVE BOX
ENT ISSUE/POD:    · Subjective and Objective:   ENT ISSUE/POD: s/p tracheostomy POD7    HPI:  66 YO M with significant PMHx, admitted for weakness and fatigue, was found to have CAD S/P CABG on 6/18. ENT called to evaluate for tracheostomy. Pt failed weaning trials and was not a candidate for extubation as per primary team. Now S/P # 8 DCT Tracheostomy POD #7. Doing well on trach collar. Thick secretions. Tube feeds on hold.        PAST MEDICAL & SURGICAL HISTORY:  CHF (congestive heart failure)    CAD (coronary artery disease)    Depression    Pleural effusion    History of 2019 novel coronavirus disease (COVID-19)  april 2020    Hemorrhoids    Bleeding hemorrhoids    Peripheral arterial disease    Claudication    BPH with urinary obstruction    ACC/AHA stage D systolic heart failure    Anticoagulation goal of INR 2.0 to 2.5    Falls    Clavicle fracture    CKD (chronic kidney disease), stage III    Iron deficiency anemia    H/O epistaxis    Vertigo    GI bleed    S/P TVR (tricuspid valve repair)    S/P ventricular assist device    S/P endoscopy      Allergies    No Known Allergies    Intolerances      MEDICATIONS  (STANDING):  aMIOdarone    Tablet 200 milliGRAM(s) Oral daily  ceFAZolin   IVPB 2000 milliGRAM(s) IV Intermittent every 8 hours  chlorhexidine 0.12% Liquid 15 milliLiter(s) Oral Mucosa every 12 hours  chlorhexidine 2% Cloths 1 Application(s) Topical <User Schedule>  clonazePAM  Tablet 0.5 milliGRAM(s) Oral at bedtime  heparin  Infusion 400 Unit(s)/Hr (12 mL/Hr) IV Continuous <Continuous>  insulin lispro (ADMELOG) corrective regimen sliding scale   SubCutaneous every 6 hours  pantoprazole  Injectable 40 milliGRAM(s) IV Push daily  scopolamine 1 mG/72 Hr(s) Patch 1 Patch Transdermal every 72 hours  sodium chloride 0.9%. 1000 milliLiter(s) (10 mL/Hr) IV Continuous <Continuous>  vancomycin    Solution 500 milliGRAM(s) Oral every 6 hours    MEDICATIONS  (PRN):  acetaminophen    Suspension .. 650 milliGRAM(s) Oral every 6 hours PRN Temp greater or equal to 38C (100.4F)  hydrALAZINE 10 milliGRAM(s) Oral every 8 hours PRN MAP greater than 85      ROS:   ENT: all negative except as noted in HPI   Pulm: denies SOB, hemoptysis  Neuro: denies numbness/tingling, loss of sensation  Endo: denies heat/cold intolerance, excessive sweating      Vital Signs Last 24 Hrs  T(C): 37.4 (02 Jul 2021 00:00), Max: 37.5 (01 Jul 2021 12:00)  T(F): 99.3 (02 Jul 2021 00:00), Max: 99.5 (01 Jul 2021 12:00)  HR: 99 (02 Jul 2021 06:00) (84 - 108)  BP: --  BP(mean): --  RR: 29 (02 Jul 2021 06:00) (11 - 41)  SpO2: 98% (02 Jul 2021 06:00) (95% - 100%)                          9.1    13.49 )-----------( 397      ( 02 Jul 2021 00:09 )             30.2    07-02    134<L>  |  98  |  6<L>  ----------------------------<  103<H>  4.2   |  25  |  0.62    Ca    9.3      02 Jul 2021 00:09  Phos  2.8     07-02  Mg     2.0     07-02    TPro  7.5  /  Alb  3.5  /  TBili  0.4  /  DBili  x   /  AST  29  /  ALT  20  /  AlkPhos  135<H>  07-02   PTT - ( 02 Jul 2021 00:10 )  PTT:58.5 sec    PHYSICAL EXAM:  Gen: NAD, On trach collar.   Skin: No rashes, bruises, or lesions.  Head: Normocephalic, Atraumatic.  Face: no edema, erythema, or fluctuance. Parotid glands soft without mass.  Eyes: no scleral injection.  Nose: Nares bilaterally patent, no discharge  Mouth: No Stridor / Drooling / Trismus.  Mucosa moist, tongue/uvula midline, oropharynx clear  Neck: # 8 DCT Trach, sutures x 4 removed, and Umbilical Tie changed to velcro tie. Dried secretions removed from around stoma. No bleeding noted. No hematoma/crepitus. Flat, supple, no lymphadenopathy, trachea midline, no masses.  Lymphatic: No lymphadenopathy  Resp: On Vent.   Neuro: Unable to obtain.          ENT ISSUE/POD: s/p tracheostomy POD7    HPI:  64 YO M with significant PMHx, admitted for weakness and fatigue, was found to have CAD S/P CABG on 6/18. ENT called to evaluate for tracheostomy. Pt failed weaning trials and was not a candidate for extubation as per primary team. Now S/P # 8 DCT Tracheostomy POD #7. Doing well on trach collar.        PAST MEDICAL & SURGICAL HISTORY:  CHF (congestive heart failure)    CAD (coronary artery disease)    Depression    Pleural effusion    History of 2019 novel coronavirus disease (COVID-19)  april 2020    Hemorrhoids    Bleeding hemorrhoids    Peripheral arterial disease    Claudication    BPH with urinary obstruction    ACC/AHA stage D systolic heart failure    Anticoagulation goal of INR 2.0 to 2.5    Falls    Clavicle fracture    CKD (chronic kidney disease), stage III    Iron deficiency anemia    H/O epistaxis    Vertigo    GI bleed    S/P TVR (tricuspid valve repair)    S/P ventricular assist device    S/P endoscopy      Allergies    No Known Allergies    Intolerances      MEDICATIONS  (STANDING):  aMIOdarone    Tablet 200 milliGRAM(s) Oral daily  ceFAZolin   IVPB 2000 milliGRAM(s) IV Intermittent every 8 hours  chlorhexidine 0.12% Liquid 15 milliLiter(s) Oral Mucosa every 12 hours  chlorhexidine 2% Cloths 1 Application(s) Topical <User Schedule>  clonazePAM  Tablet 0.5 milliGRAM(s) Oral at bedtime  heparin  Infusion 400 Unit(s)/Hr (12 mL/Hr) IV Continuous <Continuous>  insulin lispro (ADMELOG) corrective regimen sliding scale   SubCutaneous every 6 hours  pantoprazole  Injectable 40 milliGRAM(s) IV Push daily  scopolamine 1 mG/72 Hr(s) Patch 1 Patch Transdermal every 72 hours  sodium chloride 0.9%. 1000 milliLiter(s) (10 mL/Hr) IV Continuous <Continuous>  vancomycin    Solution 500 milliGRAM(s) Oral every 6 hours    MEDICATIONS  (PRN):  acetaminophen    Suspension .. 650 milliGRAM(s) Oral every 6 hours PRN Temp greater or equal to 38C (100.4F)  hydrALAZINE 10 milliGRAM(s) Oral every 8 hours PRN MAP greater than 85      ROS:   ENT: all negative except as noted in HPI   Pulm: denies SOB, hemoptysis  Neuro: denies numbness/tingling, loss of sensation  Endo: denies heat/cold intolerance, excessive sweating      Vital Signs Last 24 Hrs  T(C): 37.4 (02 Jul 2021 00:00), Max: 37.5 (01 Jul 2021 12:00)  T(F): 99.3 (02 Jul 2021 00:00), Max: 99.5 (01 Jul 2021 12:00)  HR: 99 (02 Jul 2021 06:00) (84 - 108)  BP: --  BP(mean): --  RR: 29 (02 Jul 2021 06:00) (11 - 41)  SpO2: 98% (02 Jul 2021 06:00) (95% - 100%)                          9.1    13.49 )-----------( 397      ( 02 Jul 2021 00:09 )             30.2    07-02    134<L>  |  98  |  6<L>  ----------------------------<  103<H>  4.2   |  25  |  0.62    Ca    9.3      02 Jul 2021 00:09  Phos  2.8     07-02  Mg     2.0     07-02    TPro  7.5  /  Alb  3.5  /  TBili  0.4  /  DBili  x   /  AST  29  /  ALT  20  /  AlkPhos  135<H>  07-02   PTT - ( 02 Jul 2021 00:10 )  PTT:58.5 sec    PHYSICAL EXAM:  Gen: NAD, On trach collar.   Skin: No rashes, bruises, or lesions.  Head: Normocephalic, Atraumatic.  Face: no edema, erythema, or fluctuance. Parotid glands soft without mass.  Eyes: no scleral injection.  Nose: Nares bilaterally patent, no discharge  Mouth: No Stridor / Drooling / Trismus.  Mucosa moist, tongue/uvula midline, oropharynx clear  Neck: # 8 DCT Trach, sutures x 4 removed, and Umbilical Tie changed to velcro tie. Dried secretions removed from around stoma. No bleeding noted. No hematoma/crepitus. Flat, supple, no lymphadenopathy, trachea midline, no masses.  Lymphatic: No lymphadenopathy  Resp: tolerating TC  Neuro: Unable to obtain.

## 2021-07-02 NOTE — PROGRESS NOTE ADULT - ASSESSMENT
Assessment and Recommendation:   · Assessment	  Assessment and recommendation :  Recurrent Acute hypoxic respiratory Failure S/P tracheostomy on full ventilatory support alternating with trach. collar   Acute left lower lobe pneumonia  possible Aspiration pneumonia   Diarrhea +ve for C-diff. colitis on PO vancomycin and IV Flagyl   recurrent fever start on cefepime , LLL pneumonia    Non ischemic cardiomyopathy continue ACE inhibitor and B-Blockers   S/P Septic shock and cardiogenic shock   Stage D systolic heart failure S/P LVAD HM2   MH2 LVAD  with  TV Annuloplasty  on IV Argatroban for LVAD   Severe peripheral vascular disease   S/P Anion gap metabolic acidosis  severe hyperglycemia on insulin coverage    On  Amiodarone and IV heparin   critical care polyneuropathy   Anemia of Acute blood Loss   S/P Small bowel bleeding   S/P blood and FFP transfusion   Chronic kidney disease stage III  Continue NGT feeding   GI prophylaxis  discuss with TCV surgeon   critical care time spend is 35 minutes

## 2021-07-02 NOTE — CHART NOTE - NSCHARTNOTEFT_GEN_A_CORE
LVAD interrogation 7/2  Flow: 4.6L/min  Speed: 9200 RPM  PI: 5.3   Power: 5.3 gallegos   Alarms: None  Changes to device programming: None

## 2021-07-02 NOTE — PROGRESS NOTE ADULT - SUBJECTIVE AND OBJECTIVE BOX
CRITICAL CARE ATTENDING - CTICU    MEDICATIONS  (STANDING):  aMIOdarone    Tablet 200 milliGRAM(s) Oral daily  ceFAZolin   IVPB 2000 milliGRAM(s) IV Intermittent every 8 hours  chlorhexidine 0.12% Liquid 15 milliLiter(s) Oral Mucosa every 12 hours  chlorhexidine 2% Cloths 1 Application(s) Topical <User Schedule>  clonazePAM  Tablet 0.5 milliGRAM(s) Oral at bedtime  heparin  Infusion 400 Unit(s)/Hr (12 mL/Hr) IV Continuous <Continuous>  insulin lispro (ADMELOG) corrective regimen sliding scale   SubCutaneous every 6 hours  pantoprazole  Injectable 40 milliGRAM(s) IV Push daily  scopolamine 1 mG/72 Hr(s) Patch 1 Patch Transdermal every 72 hours  sodium chloride 0.9%. 1000 milliLiter(s) (10 mL/Hr) IV Continuous <Continuous>  vancomycin    Solution 500 milliGRAM(s) Oral every 6 hours                                    9.1    13.49 )-----------( 397      ( 2021 00:09 )             30.2           134<L>  |  98  |  6<L>  ----------------------------<  103<H>  4.2   |  25  |  0.62    Ca    9.3      2021 00:09  Phos  2.8       Mg     2.0         TPro  7.5  /  Alb  3.5  /  TBili  0.4  /  DBili  x   /  AST  29  /  ALT  20  /  AlkPhos  135<H>        PTT - ( 2021 00:10 )  PTT:58.5 sec    Mode: off      Daily     Daily Weight in k (2021 04:00)      06-30 @ 07:  -   @ 07:00  --------------------------------------------------------  IN: 3378.7 mL / OUT: 3335 mL / NET: 43.7 mL     @ 07:01  -  02 @ 06:40  --------------------------------------------------------  IN: 990.5 mL / OUT: 1680 mL / NET: -689.5 mL        Critically Ill patient  : [ ] preoperative ,   [ ] post operative [x] Non Operative     Requires :  [x] Arterial Line   [x] Central Line  [ ] PA catheter  [ ] IABP  [ ] ECMO  [ ] LVAD  [ ] Ventilator  [ ] pacemaker [ ] Impella.                      [x] ABG's     [x] Pulse Oxymetry Monitoring  Bedside evaluation , monitoring , treatment of hemodynamics , fluids , IVP/ IVCD meds.        Diagnosis:     Admitted to CTU on  for melena with anemia     POD 7 - Tracheostomy     LVAD patient     Ventilator Management:  [ ]AC-rest    [ ]CPAP-PS Wean    [x]Trach Collar     [ ]Extubate    [ ] T-Piece  [ ]peep>5     Hypovolemia     CHF- acute [ ]   chronic [x ]    systolic [x ]   diatolic [ ]          - Echo- EF - BiV            [ x] RV dysfunction          - Cxr-cardiomegally, edema          - Clinical-  [ ]inotropes   [ ]pressors   [ ]diuresis   [ ]IABP   [ ]ECMO   [x]LVAD   [x]Respiratory Failure.   CKD    Sepsis - L Pneumonia/ pneumonitis - resolving    IVCD anticoagulation with [x ] Heparin  [ ] Argatroban for LVAD circuit     Requires chest PT, pulmonary toilet, ambu bagging, suctioning to maintain SaO2,  patent airway and treat atelectasis.     Requires bedside physical therapy, mobilization and total care home care.     Agitation / Delirium       ILuis Fernando, personally performed the services described in this documentation. All medical record entries made by the scribe were at my direction and in my presence. I have reviewed the chart and agree that the record reflects my personal performance and is accurate and complete.   Luis Fernando Thompson MD.       By signing my name below, I, Emily Roa, attest that this documentation has been prepared under the direction and in the presence of Luis Fernando Thompson MD.   Electronically Signed: Emily Roa Scribe 21 @ 06:40        Discussed with CT surgeon, Physician Assistant - Nurse Practitioner- Critical care medicine team.   Dicussed at  AM / PM rounds.   Chart, labs , films reviewed.    Cumulative Critical Care Time Given Today:  CRITICAL CARE ATTENDING - CTICU    MEDICATIONS  (STANDING):  aMIOdarone    Tablet 200 milliGRAM(s) Oral daily  ceFAZolin   IVPB 2000 milliGRAM(s) IV Intermittent every 8 hours  chlorhexidine 0.12% Liquid 15 milliLiter(s) Oral Mucosa every 12 hours  chlorhexidine 2% Cloths 1 Application(s) Topical <User Schedule>  clonazePAM  Tablet 0.5 milliGRAM(s) Oral at bedtime  heparin  Infusion 400 Unit(s)/Hr (12 mL/Hr) IV Continuous <Continuous>  insulin lispro (ADMELOG) corrective regimen sliding scale   SubCutaneous every 6 hours  pantoprazole  Injectable 40 milliGRAM(s) IV Push daily  scopolamine 1 mG/72 Hr(s) Patch 1 Patch Transdermal every 72 hours  sodium chloride 0.9%. 1000 milliLiter(s) (10 mL/Hr) IV Continuous <Continuous>  vancomycin    Solution 500 milliGRAM(s) Oral every 6 hours                                    9.1    13.49 )-----------( 397      ( 2021 00:09 )             30.2           134<L>  |  98  |  6<L>  ----------------------------<  103<H>  4.2   |  25  |  0.62    Ca    9.3      2021 00:09  Phos  2.8       Mg     2.0         TPro  7.5  /  Alb  3.5  /  TBili  0.4  /  DBili  x   /  AST  29  /  ALT  20  /  AlkPhos  135<H>        PTT - ( 2021 00:10 )  PTT:58.5 sec    Mode: off      Daily     Daily Weight in k (2021 04:00)      06-30 @ 07:  -   @ 07:00  --------------------------------------------------------  IN: 3378.7 mL / OUT: 3335 mL / NET: 43.7 mL     @ 07:01  -  02 @ 06:40  --------------------------------------------------------  IN: 990.5 mL / OUT: 1680 mL / NET: -689.5 mL        Critically Ill patient  : [ ] preoperative ,   [ ] post operative   [x] Non Operative     Requires :  [x] Arterial Line   [x] Central Line  [ ] PA catheter  [ ] IABP  [ ] ECMO  [x ] LVAD  [ x] Ventilator  [ ] pacemaker [ ] Impella.                      [x] ABG's     [x] Pulse Oxymetry Monitoring  Bedside evaluation , monitoring , treatment of hemodynamics , fluids , IVP/ IVCD meds.        Diagnosis:     Admitted to CTU on  for melena with anemia     POD 7 - Tracheostomy     respiratory failure     Resolving L Pneumonia / ARDS    LVAD patient     Ventilator Management:  [x ]AC-rest    [ x]CPAP-PS Wean    [x]Trach Collar     [ ]Extubate    [ ] T-Piece  [ ]peep>5     Hypovolemia     CHF- acute [ ]   chronic [x ]    systolic [x ]   diatolic [ ]          - Echo- EF - BiV            [ x] RV dysfunction          - Cxr-cardiomegally, edema          - Clinical-  [ ]inotropes   [ ]pressors   [ x]diuresis   [ ]IABP   [ ]ECMO   [x]LVAD   [x]Respiratory Failure.   CKD    Sepsis - L Pneumonia/ pneumonitis - resolving    IVCD anticoagulation with [x ] Heparin  [ ] Argatroban for LVAD circuit     Requires chest PT, pulmonary toilet, ambu bagging, suctioning to maintain SaO2,  patent airway and treat atelectasis.     Difficult weaning process - multiple organ system involvement in critically ill patient     Requires bedside physical therapy, mobilization and total nursing home care.     Agitation / Delirium       ILuis Fernando, personally performed the services described in this documentation. All medical record entries made by the scribe were at my direction and in my presence. I have reviewed the chart and agree that the record reflects my personal performance and is accurate and complete.   Luis Fernando Thompson MD.       By signing my name below, I, Emily Roa, attest that this documentation has been prepared under the direction and in the presence of Luis Fernando Thompson MD.   Electronically Signed: Emily Roa Scribe 21 @ 06:40        Discussed with CT surgeon, Physician Assistant - Nurse Practitioner- Critical care medicine team.   Dicussed at  AM / PM rounds.   Chart, labs , films reviewedCumulative Critical Care :  30 min

## 2021-07-02 NOTE — PROGRESS NOTE ADULT - ATTENDING COMMENTS
Increased thick secretions per RN, but Dr. Thompson did bronchoscopy that had minimal non-purulent secretions.  He seems more alert and comfortable today.   Remains deconditioned and weak. More aggressive PT is planned.  Continue current therapies.

## 2021-07-03 NOTE — PROGRESS NOTE ADULT - SUBJECTIVE AND OBJECTIVE BOX
RICKY HAMPTON  MRN-[id number]  Patient is a 66y old  Male who presents with a chief complaint of Anemia, Supratherapeutic INR, Dark Stools (2021 21:31)    HPI:  65M PMH ACC/AHA stage D HF due to NICM HM2 LVAD , TV annuloplasty ring 17 as destination therapy due to severe peripheral artery disease with significant stenosis  SIADH, Depression, CKD-3 with hyperkalemia, past E. coli UTIs, driveline drainage (21) and COVID-19 (back in 2020)  He was recently seen in clinic where he complained of abdominal pain and dark stools w constipation back in May. He presents to [facility] ER today weakness and fatigue, moderate and + Black stools for three days, on coumadin secondary to warfarin use in the setting of an LVAD. Patient has required transfusions for GIB in the past. Mostly recently back in 2021 pt had anemia with dark stools. No interventions was done at that time. However Last Endoscopy was done in 2020 (negative). Today labs show patient is anemic with H/H of 4.5/16.3,. INR is 8.84 MAP in the 90s, Temp 35.1. He denies any chest pain, shortness of breath, dizziness, abd pain, nausea or vomiting.       (2021 16:57)      Surgery/Hospital Course:   admit for melena w/ anemia, INR 8.84   6/15 Capsul study (+) for small bowel bleed, balloon endoscopy (old blood in prox ileum); post EGD - septic w/ L opacity, re-intubated for concern for aspiration, TTE (Mod MR, decrease biV w/ interventricular septum boweing towards R)   bronch    TC    CT C/A/P: Fluid filled colon which may be 2/2 rapid transit. Small bilateral pleffs with associates. Compressive atelectasis New ISABELLE & LLL  parenchymal opacities, suspicious for pneumonia. 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    #8 Shiley trach at bedside    CPAP trials    LVAD speed increased to 9200   Bronch    Today:    ICU Vital Signs Last 24 Hrs  T(C): 37 (2021 04:00), Max: 37.4 (2021 08:00)  T(F): 98.6 (2021 04:00), Max: 99.3 (2021 08:00)  HR: 102 (2021 05:31) (90 - 110)  BP: --  BP(mean): --  ABP: 112/73 (2021 05:31) (89/69 - 129/80)  ABP(mean): 89 (2021 05:31) (68 - 101)  RR: 40 (2021 05:31) (12 - 43)  SpO2: 99% (2021 05:31) (95% - 100%)      Physical Exam:  Gen:  Intubated.  CNS: non focal 	  Neck: no JVD, +ETT Midline  RES : clear , no wheezing,            CVS: Regular  rhythm. Normal S1/S2  Abd: Soft, non-distended. Bowel sounds present.  Skin: No rash.  Ext:  no edema    ============================I/O===========================   I&O's Detail    2021 07:01  -  2021 07:00  --------------------------------------------------------  IN:    Enteral Tube Flush: 50 mL    Heparin: 272.5 mL    IV PiggyBack: 100 mL    IV PiggyBack: 200 mL    Miscellaneous Tube Feedin mL    sodium chloride 0.9%: 240 mL  Total IN: 1012.5 mL    OUT:    Dexmedetomidine: 0 mL    Indwelling Catheter - Urethral (mL): 1725 mL  Total OUT: 1725 mL    Total NET: -712.5 mL      2021 07:01  -  2021 06:48  --------------------------------------------------------  IN:    Enteral Tube Flush: 330 mL    Heparin: 255 mL    IV PiggyBack: 100 mL    Miscellaneous Tube Feedin mL  Total IN: 1235 mL    OUT:    Indwelling Catheter - Urethral (mL): 1255 mL    sodium chloride 0.9%: 0 mL  Total OUT: 1255 mL    Total NET: -20 mL        ============================ LABS =========================                        9.6    13.10 )-----------( 367      ( 2021 00:18 )             32.1     07-03    134<L>  |  95<L>  |  8   ----------------------------<  121<H>  3.9   |  24  |  0.63    Ca    9.4      2021 00:24  Phos  2.7     07-  Mg     1.8     07-03    TPro  7.7  /  Alb  3.4  /  TBili  0.4  /  DBili  x   /  AST  25  /  ALT  17  /  AlkPhos  131<H>  07-03    LIVER FUNCTIONS - ( 2021 00:24 )  Alb: 3.4 g/dL / Pro: 7.7 g/dL / ALK PHOS: 131 U/L / ALT: 17 U/L / AST: 25 U/L / GGT: x           PT/INR - ( 2021 13:02 )   PT: 15.8 sec;   INR: 1.33 ratio         PTT - ( 2021 03:10 )  PTT:43.0 sec  ABG - ( 2021 03:08 )  pH, Arterial: 7.42  pH, Blood: x     /  pCO2: 47    /  pO2: 134   / HCO3: 30    / Base Excess: 5.4   /  SaO2: 99                  ======================Micro/Rad/Cardio=================  Culture: Reviewed   CXR: Reviewed  Echo: Reviewed  ======================================================  PAST MEDICAL & SURGICAL HISTORY:  CHF (congestive heart failure)    CAD (coronary artery disease)    Depression    Pleural effusion    History of 2019 novel coronavirus disease (COVID-19)  2020    Hemorrhoids    Bleeding hemorrhoids    Peripheral arterial disease    Claudication    BPH with urinary obstruction    ACC/AHA stage D systolic heart failure    Anticoagulation goal of INR 2.0 to 2.5    Falls    Clavicle fracture    CKD (chronic kidney disease), stage III    Iron deficiency anemia    H/O epistaxis    Vertigo    GI bleed    S/P TVR (tricuspid valve repair)    S/P ventricular assist device    S/P endoscopy      ====================ASSESSMENT ==============    Stage D Nonischemic Cardiomyopathy, Status Post HM2 on 2017   Recent driveline infection on 2021   Cardiogenic shock  Hemodynamic instability   Hypovolemic shock  Acute hypoxemic respiratory failure  Aspiration pneumonia  Septic shock  GI bleed , Status Post Enteroscopy   Anemia, in setting of melena   Chronic Kidney Disease  Leukocytosis  Coagulopathy   Stress hyperglycemia   C.diff positive on    Fevers      Plan:  ====================== NEUROLOGY=====================  Continue to monitor neuro status per ICU protocol.   Continue Tylenol PRN for analgesia  Clonazepam for anxiety     acetaminophen    Suspension .. 650 milliGRAM(s) Oral every 6 hours PRN Temp greater or equal to 38C (100.4F)  clonazePAM  Tablet 0.5 milliGRAM(s) Oral at bedtime  ==================== RESPIRATORY======================  7/ Patient had a moderate amount of thin secretions which required intermittent suctioning. Secretions were increased with associated coughing after several hours on trach collar.   Respiratory status required full ventilatory support with intermittent weaning to pressure support and trach collar, close monitoring of respiratory rate and breathing pattern, the following of ABG’s with A-line monitoring, continuous pulse oximetry monitoring.     Mechanical Ventilation:  Mode: AC/ CMV (Assist Control/ Continuous Mandatory Ventilation)  RR (machine): 12  TV (machine): 450  FiO2: 40  PEEP: 5  ITime: 1  MAP: 8  PIP: 16      ====================CARDIOVASCULAR==================  Stage D Nonischemic Cardiomyopathy, Status Post HM2 on 2017   Cardiogenic shock  Hemodynamic instability   Hypovolemic shock  HM2 settings: was initially 8000 but increased to 9000 rpm due to MR and to help with volume overload after transfusion and volume resuscitation.    LVAD speed increased to 9200   Echo 6/15: severe global Left ventricular systolic dysfunction with HM2 in place, decreased Right ventricular systolic function, interventricular septums bows slightly to right, otherwise grossly similar to prior.   Rate and rhythm control with Amiodarone.   Invasive hemodynamic monitoring, assess perfusion indices.   Pressor support with Hydralazine.     aMIOdarone    Tablet 200 milliGRAM(s) Oral daily  hydrALAZINE 10 milliGRAM(s) Oral every 8 hours PRN MAP greater than 85    ===================HEMATOLOGIC/ONC ===================  Anemia due to acute blood loss associated with small bowel bleed.   Monitor H&H/Plts  IV heparin for LVAD, and venous thromboembolism prophylaxis  in addition to sequential compression devices    heparin  Infusion 400 Unit(s)/Hr (11 mL/Hr) IV Continuous <Continuous>  ===================== RENAL =========================  Continue to monitor I/Os, BUN/Creatinine, and urine output.   Goal net negative fluid balance. Replete lytes PRN. Keep K> 4 and Mg >2.   ==================== GASTROINTESTINAL===================  NPO with Tube Feed  Continue Protonix for stress ulcer prophylaxis.     pantoprazole  Injectable 40 milliGRAM(s) IV Push daily  sodium chloride 0.9%. 1000 milliLiter(s) (10 mL/Hr) IV Continuous <Continuous>  scopolamine 1 mG/72 Hr(s) Patch 1 Patch Transdermal every 72 hours  =======================    ENDOCRINE  =====================  Metabolic stability, stress hyperglycemia requiring Admleog sliding scale.    insulin lispro (ADMELOG) corrective regimen sliding scale   SubCutaneous every 6 hours  ========================INFECTIOUS DISEASE================  Temperature 99.3F, Leukocytosis w/ WBC 13.10  Monitor temperature and trend WBC  : C.diff positive, will continue Vancomycin for C.diff   Continue cefazolin for bacteremia     ceFAZolin   IVPB 2000 milliGRAM(s) IV Intermittent every 8 hours  vancomycin    Solution 500 milliGRAM(s) Oral every 6 hours          I have spent 35 minutes providing acute care for this critically ill patient     Patient requires continuous monitoring with bedside rhythm monitoring, pulse ox monitoring, and intermittent blood gas analysis. Care plan discussed with ICU care team. Patient remained critical and at risk for life threatening decompensation.     By signing my name below, I, Emily Roa, attest that this documentation has been prepared under the direction and in the presence of Kota Thomas MD   Electronically signed: Cesia Cottrell, 21 @ 06:48    I, Kota Thomas, personally performed the services described in this documentation. all medical record entries made by the scribe were at my direction and in my presence. I have reviewed the chart and agree that the record reflects my personal performance and is accurate and complete  Electronically signed: Kota Thomas MD        RICKY HAMPTON  MRN-[id number]       Patient is a 66y old  Male who presents with a chief complaint of Anemia, Supratherapeutic INR, Dark Stools (2021 21:31)    HPI:  65M PMH ACC/AHA stage D HF due to NICM HM2 LVAD , TV annuloplasty ring 17 as destination therapy due to severe peripheral artery disease with significant stenosis  SIADH, Depression, CKD-3 with hyperkalemia, past E. coli UTIs, driveline drainage (21) and COVID-19 (back in 2020)  He was recently seen in clinic where he complained of abdominal pain and dark stools w constipation back in May. He presents to [facility] ER today weakness and fatigue, moderate and + Black stools for three days, on coumadin secondary to warfarin use in the setting of an LVAD. Patient has required transfusions for GIB in the past. Mostly recently back in 2021 pt had anemia with dark stools. No interventions was done at that time. However Last Endoscopy was done in 2020 (negative). Today labs show patient is anemic with H/H of 4.5/16.3,. INR is 8.84 MAP in the 90s, Temp 35.1. He denies any chest pain, shortness of breath, dizziness, abd pain, nausea or vomiting.       (2021 16:57)      Surgery/Hospital Course:   admit for melena w/ anemia, INR 8.84   6/15 Capsul study (+) for small bowel bleed, balloon endoscopy (old blood in prox ileum); post EGD - septic w/ L opacity, re-intubated for concern for aspiration, TTE (Mod MR, decrease biV w/ interventricular septum boweing towards R)   bronch    TC    CT C/A/P: Fluid filled colon which may be 2/2 rapid transit. Small bilateral pleffs with associates. Compressive atelectasis New ISABELLE & LLL  parenchymal opacities, suspicious for pneumonia. 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    #8 Shiley trach at bedside    CPAP trials    LVAD speed increased to 9200   Bronch; Central line dc'ed;     Today:  Received first dose of 3mg coumadin yesterday. Pt yesterday  desatted after being on trach collar from 6am-11:30pm. Plan to have to patient on trach collar for 12 hours, and 12 hours on vent.       ICU Vital Signs Last 24 Hrs  T(C): 37 (2021 04:00), Max: 37.4 (2021 08:00)  T(F): 98.6 (2021 04:00), Max: 99.3 (2021 08:00)  HR: 102 (2021 05:31) (90 - 110)  BP: --  BP(mean): --  ABP: 112/73 (2021 05:31) (89/69 - 129/80)  ABP(mean): 89 (2021 05:31) (68 - 101)  RR: 40 (2021 05:31) (12 - 43)  SpO2: 99% (2021 05:31) (95% - 100%)      Physical Exam:  Gen:  Intubated.  CNS: non focal 	  Neck: no JVD, +ETT Midline  RES : clear , no wheezing,            CVS: Regular  rhythm. Normal S1/S2  Abd: Soft, non-distended. Bowel sounds present.  Skin: No rash.  Ext:  no edema    ============================I/O===========================   I&O's Detail    2021 07:01  -  2021 07:00  --------------------------------------------------------  IN:    Enteral Tube Flush: 50 mL    Heparin: 272.5 mL    IV PiggyBack: 100 mL    IV PiggyBack: 200 mL    Miscellaneous Tube Feedin mL    sodium chloride 0.9%: 240 mL  Total IN: 1012.5 mL    OUT:    Dexmedetomidine: 0 mL    Indwelling Catheter - Urethral (mL): 1725 mL  Total OUT: 1725 mL    Total NET: -712.5 mL      2021 07:01  -  2021 06:48  --------------------------------------------------------  IN:    Enteral Tube Flush: 330 mL    Heparin: 255 mL    IV PiggyBack: 100 mL    Miscellaneous Tube Feedin mL  Total IN: 1235 mL    OUT:    Indwelling Catheter - Urethral (mL): 1255 mL    sodium chloride 0.9%: 0 mL  Total OUT: 1255 mL    Total NET: -20 mL        ============================ LABS =========================                        9.6    13.10 )-----------( 367      ( 2021 00:18 )             32.1     07-03    134<L>  |  95<L>  |  8   ----------------------------<  121<H>  3.9   |  24  |  0.63    Ca    9.4      2021 00:24  Phos  2.7     07-  Mg     1.8     07-    TPro  7.7  /  Alb  3.4  /  TBili  0.4  /  DBili  x   /  AST  25  /  ALT  17  /  AlkPhos  131<H>  07-03    LIVER FUNCTIONS - ( 2021 00:24 )  Alb: 3.4 g/dL / Pro: 7.7 g/dL / ALK PHOS: 131 U/L / ALT: 17 U/L / AST: 25 U/L / GGT: x           PT/INR - ( 2021 13:02 )   PT: 15.8 sec;   INR: 1.33 ratio         PTT - ( 2021 03:10 )  PTT:43.0 sec  ABG - ( 2021 03:08 )  pH, Arterial: 7.42  pH, Blood: x     /  pCO2: 47    /  pO2: 134   / HCO3: 30    / Base Excess: 5.4   /  SaO2: 99                  ======================Micro/Rad/Cardio=================  Culture: Reviewed   CXR: Reviewed  Echo: Reviewed  ======================================================  PAST MEDICAL & SURGICAL HISTORY:  CHF (congestive heart failure)    CAD (coronary artery disease)    Depression    Pleural effusion    History of  novel coronavirus disease (COVID-19)  2020    Hemorrhoids    Bleeding hemorrhoids    Peripheral arterial disease    Claudication    BPH with urinary obstruction    ACC/AHA stage D systolic heart failure    Anticoagulation goal of INR 2.0 to 2.5    Falls    Clavicle fracture    CKD (chronic kidney disease), stage III    Iron deficiency anemia    H/O epistaxis    Vertigo    GI bleed    S/P TVR (tricuspid valve repair)    S/P ventricular assist device    S/P endoscopy      ====================ASSESSMENT ==============    Stage D Nonischemic Cardiomyopathy, Status Post HM2 on 2017   Recent driveline infection on 2021   Cardiogenic shock  Hemodynamic instability   Hypovolemic shock  Acute hypoxemic respiratory failure  Aspiration pneumonia  Septic shock  GI bleed , Status Post Enteroscopy   Anemia, in setting of melena   Chronic Kidney Disease  Leukocytosis  Coagulopathy   Stress hyperglycemia   C.diff positive on    Fevers      Plan:  ====================== NEUROLOGY=====================  Continue to monitor neuro status per ICU protocol.   Continue Tylenol PRN for analgesia  Clonazepam for anxiety     acetaminophen    Suspension .. 650 milliGRAM(s) Oral every 6 hours PRN Temp greater or equal to 38C (100.4F)  clonazePAM  Tablet 0.5 milliGRAM(s) Oral at bedtime  ==================== RESPIRATORY======================  7/1 Patient had a moderate amount of thin secretions which required intermittent suctioning. Secretions were increased with associated coughing after several hours on trach collar.   Respiratory status required full ventilatory support with intermittent weaning to pressure support and trach collar, close monitoring of respiratory rate and breathing pattern, the following of ABG’s with A-line monitoring, continuous pulse oximetry monitoring.    Pt desatted after being on trach collar from 6am-11:30pm.   Plan to have to patient on trach collar for 12 hours, and 12 hours on vent.     Mechanical Ventilation:  Mode: AC/ CMV (Assist Control/ Continuous Mandatory Ventilation)  RR (machine): 12  TV (machine): 450  FiO2: 40  PEEP: 5  ITime: 1  MAP: 8  PIP: 16    ====================CARDIOVASCULAR==================  Stage D Nonischemic Cardiomyopathy, Status Post HM2 on 2017   Cardiogenic shock  Hemodynamic instability   Hypovolemic shock  HM2 settings: was initially 8000 but increased to 9000 rpm due to MR and to help with volume overload after transfusion and volume resuscitation.    LVAD speed increased to 9200   Echo 6/15: severe global Left ventricular systolic dysfunction with HM2 in place, decreased Right ventricular systolic function, interventricular septums bows slightly to right, otherwise grossly similar to prior.   Rate and rhythm control with Amiodarone.   Invasive hemodynamic monitoring, assess perfusion indices.   Pressor support with Hydralazine.     aMIOdarone    Tablet 200 milliGRAM(s) Oral daily  hydrALAZINE 10 milliGRAM(s) Oral every 8 hours PRN MAP greater than 85    ===================HEMATOLOGIC/ONC ===================  Anemia due to acute blood loss associated with small bowel bleed.   Monitor H&H/Plts  IV heparin for LVAD, and venous thromboembolism prophylaxis  in addition to sequential compression devices    heparin  Infusion 400 Unit(s)/Hr (11 mL/Hr) IV Continuous <Continuous>  ===================== RENAL =========================  Continue to monitor I/Os, BUN/Creatinine, and urine output.   Goal net negative fluid balance. Replete lytes PRN. Keep K> 4 and Mg >2.   ==================== GASTROINTESTINAL===================  NPO with Tube Feed  Continue Protonix for stress ulcer prophylaxis.     pantoprazole  Injectable 40 milliGRAM(s) IV Push daily  sodium chloride 0.9%. 1000 milliLiter(s) (10 mL/Hr) IV Continuous <Continuous>  scopolamine 1 mG/72 Hr(s) Patch 1 Patch Transdermal every 72 hours  =======================    ENDOCRINE  =====================  Metabolic stability, stress hyperglycemia requiring Admleog sliding scale.    insulin lispro (ADMELOG) corrective regimen sliding scale   SubCutaneous every 6 hours  ========================INFECTIOUS DISEASE================  Temperature 99.3F, Leukocytosis w/ WBC 13.10  Monitor temperature and trend WBC  : C.diff positive, will continue Vancomycin for C.diff   Continue cefazolin for bacteremia     ceFAZolin   IVPB 2000 milliGRAM(s) IV Intermittent every 8 hours  vancomycin    Solution 500 milliGRAM(s) Oral every 6 hours          I have spent 35 minutes providing acute care for this critically ill patient     Patient requires continuous monitoring with bedside rhythm monitoring, pulse ox monitoring, and intermittent blood gas analysis. Care plan discussed with ICU care team. Patient remained critical and at risk for life threatening decompensation.     By signing my name below, I, Emily Roa, attest that this documentation has been prepared under the direction and in the presence of Kota Thomas MD   Electronically signed: Cesia Cottrell, 21 @ 06:48    I, Kota Thomas, personally performed the services described in this documentation. all medical record entries made by the scribe were at my direction and in my presence. I have reviewed the chart and agree that the record reflects my personal performance and is accurate and complete  Electronically signed: Kota Thomas MD

## 2021-07-03 NOTE — PROGRESS NOTE ADULT - ASSESSMENT
Assessment and Recommendation:   · Assessment	  Assessment and recommendation :  Recurrent Acute hypoxic respiratory Failure S/P tracheostomy on full ventilatory support alternating with trach. collar at 50% Fi02  Acute left lower lobe pneumonia  possible Aspiration pneumonia   Diarrhea +ve for C-diff. colitis on PO vancomycin and IV Flagyl   recurrent fever start on cefepime , LLL pneumonia    Non ischemic cardiomyopathy continue ACE inhibitor and B-Blockers   S/P Septic shock and cardiogenic shock   Stage D systolic heart failure S/P LVAD HM2   MH2 LVAD  with  TV Annuloplasty  on IV Argatroban for LVAD   Severe peripheral vascular disease   S/P Anion gap metabolic acidosis  severe hyperglycemia on insulin coverage    On  Amiodarone and IV heparin   critical care polyneuropathy   Anemia of Acute blood Loss   S/P Small bowel bleeding   S/P blood and FFP transfusion   Chronic kidney disease stage III  Continue NGT feeding   GI prophylaxis  discuss with TCV surgeon   critical care time spend is 35 minutes

## 2021-07-03 NOTE — PROGRESS NOTE ADULT - SUBJECTIVE AND OBJECTIVE BOX

## 2021-07-04 NOTE — PROGRESS NOTE ADULT - ASSESSMENT
Assessment and Recommendation:   · Assessment	  Assessment and recommendation :  Recurrent Acute hypoxic respiratory Failure S/P tracheostomy on full ventilatory support alternating with trach. collar at 50% Fi02  Acute left lower lobe pneumonia  possible Aspiration pneumonia   Diarrhea +ve for C-diff. colitis on PO vancomycin and IV Flagyl   recurrent fever start on cefepime , LLL pneumonia  on Ancef improved   Non ischemic cardiomyopathy continue ACE inhibitor and B-Blockers   S/P Septic shock and cardiogenic shock   Stage D systolic heart failure S/P LVAD HM2   MH2 LVAD  with  TV Annuloplasty  on IV Argatroban for LVAD   Severe peripheral vascular disease   S/P Anion gap metabolic acidosis  severe hyperglycemia on insulin coverage    On  Amiodarone and IV heparin   critical care polyneuropathy   Anemia of Acute blood Loss   S/P Small bowel bleeding   S/P blood and FFP transfusion   Chronic kidney disease stage III  Continue NGT feeding   GI prophylaxis  discuss with TCV surgeon   critical care time spend is 35 minutes

## 2021-07-04 NOTE — PROGRESS NOTE ADULT - SUBJECTIVE AND OBJECTIVE BOX
RICKY HAMPTON  MRN-[id number]  Patient is a 66y old  Male who presents with a chief complaint of Anemia, Supratherapeutic INR, Dark Stools (2021 21:17)    HPI:  65M PMH ACC/AHA stage D HF due to NICM HM2 LVAD , TV annuloplasty ring 17 as destination therapy due to severe peripheral artery disease with significant stenosis  SIADH, Depression, CKD-3 with hyperkalemia, past E. coli UTIs, driveline drainage (21) and COVID-19 (back in 2020)  He was recently seen in clinic where he complained of abdominal pain and dark stools w constipation back in May. He presents to [facility] ER today weakness and fatigue, moderate and + Black stools for three days, on coumadin secondary to warfarin use in the setting of an LVAD. Patient has required transfusions for GIB in the past. Mostly recently back in 2021 pt had anemia with dark stools. No interventions was done at that time. However Last Endoscopy was done in 2020 (negative). Today labs show patient is anemic with H/H of 4.5/16.3,. INR is 8.84 MAP in the 90s, Temp 35.1. He denies any chest pain, shortness of breath, dizziness, abd pain, nausea or vomiting.       (2021 16:57)      Surgery/Hospital Course:   admit for melena w/ anemia, INR 8.84   6/15 Capsul study (+) for small bowel bleed, balloon endoscopy (old blood in prox ileum); post EGD - septic w/ L opacity, re-intubated for concern for aspiration, TTE (Mod MR, decrease biV w/ interventricular septum boweing towards R)   bronch    TC    CT C/A/P: Fluid filled colon which may be 2/2 rapid transit. Small bilateral pleffs with associates. Compressive atelectasis New ISABELLE & LLL  parenchymal opacities, suspicious for pneumonia. 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    #8 Shiley trach at bedside    CPAP trials    LVAD speed increased to 9200   Bronch; Central line dc'ed;     Today:    ICU Vital Signs Last 24 Hrs  T(C): 37.1 (2021 08:00), Max: 37.1 (2021 08:00)  T(F): 98.7 (2021 08:00), Max: 98.7 (2021 08:00)  HR: 114 (2021 08:49) (88 - 115)  BP: --  BP(mean): --  ABP: 95/61 (2021 08:00) (81/53 - 132/76)  ABP(mean): 75 (2021 08:00) (63 - 99)  RR: 20 (2021 08:49) (12 - 88)  SpO2: 100% (2021 08:49) (96% - 100%)      Physical Exam:  Gen:  Awake, alert   CNS: non focal 	  Neck: no JVD  RES : clear , no wheezing              CVS: Regular  rhythm. Normal S1/S2  Abd: Soft, non-distended. Bowel sounds present.  Skin: No rash.  Ext:  no edema    ============================I/O===========================   I&O's Detail    2021 07:01  -  2021 07:00  --------------------------------------------------------  IN:    Enteral Tube Flush: 30 mL    Heparin: 209 mL    IV PiggyBack: 100 mL    Miscellaneous Tube Feedin mL  Total IN: 1599 mL    OUT:    Indwelling Catheter - Urethral (mL): 150 mL    sodium chloride 0.9%: 0 mL    Voided (mL): 1250 mL  Total OUT: 1400 mL    Total NET: 199 mL      2021 07:01  -  2021 08:51  --------------------------------------------------------  IN:    Heparin: 11 mL    Miscellaneous Tube Feedin mL  Total IN: 71 mL    OUT:  Total OUT: 0 mL    Total NET: 71 mL        ============================ LABS =========================                        9.6    11.54 )-----------( 349      ( 2021 00:29 )             31.9     07-    135  |  96  |  8   ----------------------------<  132<H>  3.7   |  25  |  0.65    Ca    9.4      2021 00:28  Phos  3.1     -  Mg     1.8         TPro  8.1  /  Alb  3.8  /  TBili  0.3  /  DBili  x   /  AST  18  /  ALT  11  /  AlkPhos  127<H>      LIVER FUNCTIONS - ( 2021 00:28 )  Alb: 3.8 g/dL / Pro: 8.1 g/dL / ALK PHOS: 127 U/L / ALT: 11 U/L / AST: 18 U/L / GGT: x           PT/INR - ( 2021 14:12 )   PT: 15.4 sec;   INR: 1.30 ratio         PTT - ( 2021 00:29 )  PTT:51.0 sec  ABG - ( 2021 00:20 )  pH, Arterial: 7.41  pH, Blood: x     /  pCO2: 51    /  pO2: 137   / HCO3: 31    / Base Excess: 5.7   /  SaO2: 99          ======================Micro/Rad/Cardio=================  Culture: Reviewed   CXR: Reviewed  Echo: Reviewed  ======================================================  PAST MEDICAL & SURGICAL HISTORY:  CHF (congestive heart failure)    CAD (coronary artery disease)    Depression    Pleural effusion    History of 2019 novel coronavirus disease (COVID-19)  2020    Hemorrhoids    Bleeding hemorrhoids    Peripheral arterial disease    Claudication    BPH with urinary obstruction    ACC/AHA stage D systolic heart failure    Anticoagulation goal of INR 2.0 to 2.5    Falls    Clavicle fracture    CKD (chronic kidney disease), stage III    Iron deficiency anemia    H/O epistaxis    Vertigo    GI bleed    S/P TVR (tricuspid valve repair)    S/P ventricular assist device    S/P endoscopy      ====================ASSESSMENT ==============  Stage D Nonischemic Cardiomyopathy, Status Post HM2 on 2017   Recent driveline infection on 2021   Cardiogenic shock  Hemodynamic instability   Hypovolemic shock  Acute hypoxemic respiratory failure  Aspiration pneumonia  Septic shock  GI bleed , Status Post Enteroscopy   Anemia, in setting of melena   Chronic Kidney Disease  Leukocytosis  Coagulopathy   Stress hyperglycemia   C.diff positive on    Fevers    Plan:  ====================== NEUROLOGY=====================  Continue to monitor neuro status per ICU protocol.   Continue Tylenol PRN for analgesia  Clonazepam for anxiety     acetaminophen    Suspension .. 650 milliGRAM(s) Oral every 6 hours PRN Temp greater or equal to 38C (100.4F)  clonazePAM  Tablet 0.5 milliGRAM(s) Oral at bedtime  ==================== RESPIRATORY======================  7/ Patient had a moderate amount of thin secretions which required intermittent suctioning. Secretions were increased with associated coughing after several hours on trach collar.   Respiratory status required full ventilatory support with intermittent weaning to pressure support and trach collar, close monitoring of respiratory rate and breathing pattern, the following of ABG’s with A-line monitoring, continuous pulse oximetry monitoring.    Pt desatted after being on trach collar from 6am-11:30pm.   Plan to have to patient on trach collar for 12 hours, and 12 hours on vent.     Mechanical Ventilation:  Mode: v500    ipratropium    for Nebulization 500 MICROGram(s) Nebulizer every 6 hours    ====================CARDIOVASCULAR==================  Stage D Nonischemic Cardiomyopathy, Status Post HM2 on 2017   Cardiogenic shock  Hemodynamic instability   Hypovolemic shock  HM2 settings: was initially 8000 but increased to 9000 rpm due to MR and to help with volume overload after transfusion and volume resuscitation.    LVAD speed increased to 9200   Echo 6/15: severe global Left ventricular systolic dysfunction with HM2 in place, decreased Right ventricular systolic function, interventricular septums bows slightly to right, otherwise grossly similar to prior.   Rate and rhythm control with Amiodarone.   Invasive hemodynamic monitoring, assess perfusion indices.   Pressor support with Hydralazine.     aMIOdarone    Tablet 200 milliGRAM(s) Oral daily  hydrALAZINE 10 milliGRAM(s) Oral every 8 hours PRN MAP greater than 85    ===================HEMATOLOGIC/ONC ===================  Anemia due to acute blood loss associated with small bowel bleed.   Monitor H&H/Plts  IV heparin for LVAD, and venous thromboembolism prophylaxis  in addition to sequential compression devices      Monitor H&H/Plts    heparin  Infusion 400 Unit(s)/Hr (11 mL/Hr) IV Continuous <Continuous>    ===================== RENAL =========================  Continue monitoring urine output, I&OS, BUN/Cr     ==================== GASTROINTESTINAL===================  Tolerating Vital AF, @ goal 60cc/hr.   Continue Protonix for stress ulcer prophylaxis.   Continue with Scopolamine for nausea.    pantoprazole  Injectable 40 milliGRAM(s) IV Push daily  sodium chloride 0.9%. 1000 milliLiter(s) (10 mL/Hr) IV Continuous <Continuous>  scopolamine 1 mG/72 Hr(s) Patch 1 Patch Transdermal every 72 hours  =======================    ENDOCRINE  =====================  Hx of DMT2, glycemic control with Admelog sliding scale and Glucagon PRN.   Monitor blood glucose levels.     insulin lispro (ADMELOG) corrective regimen sliding scale   SubCutaneous every 6 hours  ========================INFECTIOUS DISEASE================  Temperature 99.3F, Leukocytosis w/ WBC 13.10  Monitor temperature and trend WBC  : C.diff positive, will continue Vancomycin for C.diff   Continue cefazolin for bacteremia     ceFAZolin   IVPB 2000 milliGRAM(s) IV Intermittent every 8 hours  vancomycin    Solution 500 milliGRAM(s) Oral every 6 hours          I have spent 35 minutes providing acute care for this critically ill patient     Patient requires continuous monitoring with bedside rhythm monitoring, pulse ox monitoring, and intermittent blood gas analysis. Care plan discussed with ICU care team. Patient remained critical and at risk for life threatening decompensation.     By signing my name below, I, Brian Robin, attest that this documentation has been prepared under the direction and in the presence of Kota Thomas MD   Electronically signed: Cesia Gonzalez, 21 @ 08:51    I, Kota Thomas, personally performed the services described in this documentation. all medical record entries made by the scribmel were at my direction and in my presence. I have reviewed the chart and agree that the record reflects my personal performance and is accurate and complete  Electronically signed: Kota Thomas MD        RICKY HAMPTON  MRN-[id number]  Patient is a 66y old  Male who presents with a chief complaint of Anemia, Supratherapeutic INR, Dark Stools (2021 21:17)    HPI:  65M PMH ACC/AHA stage D HF due to NICM HM2 LVAD , TV annuloplasty ring 17 as destination therapy due to severe peripheral artery disease with significant stenosis  SIADH, Depression, CKD-3 with hyperkalemia, past E. coli UTIs, driveline drainage (21) and COVID-19 (back in 2020)  He was recently seen in clinic where he complained of abdominal pain and dark stools w constipation back in May. He presents to [facility] ER today weakness and fatigue, moderate and + Black stools for three days, on coumadin secondary to warfarin use in the setting of an LVAD. Patient has required transfusions for GIB in the past. Mostly recently back in 2021 pt had anemia with dark stools. No interventions was done at that time. However Last Endoscopy was done in 2020 (negative). Today labs show patient is anemic with H/H of 4.5/16.3,. INR is 8.84 MAP in the 90s, Temp 35.1. He denies any chest pain, shortness of breath, dizziness, abd pain, nausea or vomiting.       (2021 16:57)      Surgery/Hospital Course:   admit for melena w/ anemia, INR 8.84   6/15 Capsul study (+) for small bowel bleed, balloon endoscopy (old blood in prox ileum); post EGD - septic w/ L opacity, re-intubated for concern for aspiration, TTE (Mod MR, decrease biV w/ interventricular septum boweing towards R)   bronch    TC    CT C/A/P: Fluid filled colon which may be 2/2 rapid transit. Small bilateral pleffs with associates. Compressive atelectasis New ISABELLE & LLL  parenchymal opacities, suspicious for pneumonia. 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    #8 Shiley trach at bedside    CPAP trials    LVAD speed increased to 9200   Bronch; Central line dc'ed;     Today:    ICU Vital Signs Last 24 Hrs  T(C): 37.1 (2021 08:00), Max: 37.1 (2021 08:00)  T(F): 98.7 (2021 08:00), Max: 98.7 (2021 08:00)  HR: 114 (2021 08:49) (88 - 115)  BP: --  BP(mean): --  ABP: 95/61 (2021 08:00) (81/53 - 132/76)  ABP(mean): 75 (2021 08:00) (63 - 99)  RR: 20 (2021 08:49) (12 - 88)  SpO2: 100% (2021 08:49) (96% - 100%)      Physical Exam:  Gen:  Awake, alert   CNS: non focal 	  Neck: no JVD  RES : clear , no wheezing              CVS: Regular  rhythm. Normal S1/S2  Abd: Soft, non-distended. Bowel sounds present.  Skin: No rash.  Ext:  no edema    ============================I/O===========================   I&O's Detail    2021 07:01  -  2021 07:00  --------------------------------------------------------  IN:    Enteral Tube Flush: 30 mL    Heparin: 209 mL    IV PiggyBack: 100 mL    Miscellaneous Tube Feedin mL  Total IN: 1599 mL    OUT:    Indwelling Catheter - Urethral (mL): 150 mL    sodium chloride 0.9%: 0 mL    Voided (mL): 1250 mL  Total OUT: 1400 mL    Total NET: 199 mL      2021 07:01  -  2021 08:51  --------------------------------------------------------  IN:    Heparin: 11 mL    Miscellaneous Tube Feedin mL  Total IN: 71 mL    OUT:  Total OUT: 0 mL    Total NET: 71 mL        ============================ LABS =========================                        9.6    11.54 )-----------( 349      ( 2021 00:29 )             31.9     07-    135  |  96  |  8   ----------------------------<  132<H>  3.7   |  25  |  0.65    Ca    9.4      2021 00:28  Phos  3.1     -  Mg     1.8         TPro  8.1  /  Alb  3.8  /  TBili  0.3  /  DBili  x   /  AST  18  /  ALT  11  /  AlkPhos  127<H>      LIVER FUNCTIONS - ( 2021 00:28 )  Alb: 3.8 g/dL / Pro: 8.1 g/dL / ALK PHOS: 127 U/L / ALT: 11 U/L / AST: 18 U/L / GGT: x           PT/INR - ( 2021 14:12 )   PT: 15.4 sec;   INR: 1.30 ratio         PTT - ( 2021 00:29 )  PTT:51.0 sec  ABG - ( 2021 00:20 )  pH, Arterial: 7.41  pH, Blood: x     /  pCO2: 51    /  pO2: 137   / HCO3: 31    / Base Excess: 5.7   /  SaO2: 99          ======================Micro/Rad/Cardio=================  Culture: Reviewed   CXR: Reviewed  Echo: Reviewed  ======================================================  PAST MEDICAL & SURGICAL HISTORY:  CHF (congestive heart failure)    CAD (coronary artery disease)    Depression    Pleural effusion    History of 2019 novel coronavirus disease (COVID-19)  2020    Hemorrhoids    Bleeding hemorrhoids    Peripheral arterial disease    Claudication    BPH with urinary obstruction    ACC/AHA stage D systolic heart failure    Anticoagulation goal of INR 2.0 to 2.5    Falls    Clavicle fracture    CKD (chronic kidney disease), stage III    Iron deficiency anemia    H/O epistaxis    Vertigo    GI bleed    S/P TVR (tricuspid valve repair)    S/P ventricular assist device    S/P endoscopy      ====================ASSESSMENT ==============  Stage D Nonischemic Cardiomyopathy, Status Post HM2 on 2017   Recent driveline infection on 2021   Cardiogenic shock  Hemodynamic instability   Hypovolemic shock  Acute hypoxemic respiratory failure  Aspiration pneumonia  Septic shock  GI bleed , Status Post Enteroscopy   Anemia, in setting of melena   Chronic Kidney Disease  Leukocytosis  Coagulopathy   Stress hyperglycemia   C.diff positive on    Fevers    Plan:  ====================== NEUROLOGY=====================  Continue to monitor neuro status per ICU protocol.   Continue Tylenol PRN for analgesia  Clonazepam for anxiety     acetaminophen    Suspension .. 650 milliGRAM(s) Oral every 6 hours PRN Temp greater or equal to 38C (100.4F)  clonazePAM  Tablet 0.5 milliGRAM(s) Oral at bedtime  ==================== RESPIRATORY======================  7/ Patient had a moderate amount of thin secretions which required intermittent suctioning. Secretions were increased with associated coughing after several hours on trach collar.   Respiratory status required full ventilatory support with intermittent weaning to pressure support and trach collar, close monitoring of respiratory rate and breathing pattern, the following of ABG’s with A-line monitoring, continuous pulse oximetry monitoring.    Pt desatted after being on trach collar from 6am-11:30pm.   Plan to have to patient on trach collar for 12 hours, and 12 hours on vent.     Mechanical Ventilation:  Mode: v500    ipratropium    for Nebulization 500 MICROGram(s) Nebulizer every 6 hours  ====================CARDIOVASCULAR==================  Stage D Nonischemic Cardiomyopathy, Status Post HM2 on 2017   Cardiogenic shock  Hemodynamic instability   Hypovolemic shock  HM2 settings: was initially 8000 but increased to 9000 rpm due to MR and to help with volume overload after transfusion and volume resuscitation.    LVAD speed increased to 9200   Echo 6/15: severe global Left ventricular systolic dysfunction with HM2 in place, decreased Right ventricular systolic function, interventricular septums bows slightly to right, otherwise grossly similar to prior.   Rate and rhythm control with Amiodarone.   Invasive hemodynamic monitoring, assess perfusion indices.   Pressor support with Hydralazine.     aMIOdarone    Tablet 200 milliGRAM(s) Oral daily  hydrALAZINE 10 milliGRAM(s) Oral every 8 hours PRN MAP greater than 85  ===================HEMATOLOGIC/ONC ===================  Anemia due to acute blood loss associated with small bowel bleed.   Monitor H&H/Plts  IV heparin for LVAD, and venous thromboembolism prophylaxis  in addition to sequential compression devices    heparin  Infusion 400 Unit(s)/Hr (11 mL/Hr) IV Continuous <Continuous>  ===================== RENAL =========================  Continue to monitor I/Os, BUN/Creatinine, and urine output.   Goal net negative fluid balance. Replete lytes PRN. Keep K> 4 and Mg >2.     ==================== GASTROINTESTINAL===================  Tolerating Vital AF, @ goal 60cc/hr.   Continue Protonix for stress ulcer prophylaxis.   Continue with Scopolamine for nausea.    pantoprazole  Injectable 40 milliGRAM(s) IV Push daily  sodium chloride 0.9%. 1000 milliLiter(s) (10 mL/Hr) IV Continuous <Continuous>  scopolamine 1 mG/72 Hr(s) Patch 1 Patch Transdermal every 72 hours  =======================    ENDOCRINE  =====================  Hx of DMT2, glycemic control with Admelog sliding scale and Glucagon PRN.   Monitor blood glucose levels.     insulin lispro (ADMELOG) corrective regimen sliding scale   SubCutaneous every 6 hours  ========================INFECTIOUS DISEASE================  Temperature 98.7F, Leukocytosis downtrending w/ WBC 13.10 -> 11.54  Monitor temperature and trend WBC  : C.diff positive, will continue Vancomycin for C.diff   Continue cefazolin for bacteremia     ceFAZolin   IVPB 2000 milliGRAM(s) IV Intermittent every 8 hours  vancomycin    Solution 500 milliGRAM(s) Oral every 6 hours      I have spent 35 minutes providing acute care for this critically ill patient     Patient requires continuous monitoring with bedside rhythm monitoring, pulse ox monitoring, and intermittent blood gas analysis. Care plan discussed with ICU care team. Patient remained critical and at risk for life threatening decompensation.     By signing my name below, I, Brian Rboin, attest that this documentation has been prepared under the direction and in the presence of Kota Thomas MD   Electronically signed: Cesia Gonzalez, 21 @ 08:51    I, Kota Thomas, personally performed the services described in this documentation. all medical record entries made by the scribe were at my direction and in my presence. I have reviewed the chart and agree that the record reflects my personal performance and is accurate and complete  Electronically signed: Kota Thomas MD        RICKY HAMPTON  MRN-[id number]  Patient is a 66y old  Male who presents with a chief complaint of Anemia, Supratherapeutic INR, Dark Stools (2021 21:17)    HPI:  65M PMH ACC/AHA stage D HF due to NICM HM2 LVAD , TV annuloplasty ring 17 as destination therapy due to severe peripheral artery disease with significant stenosis  SIADH, Depression, CKD-3 with hyperkalemia, past E. coli UTIs, driveline drainage (21) and COVID-19 (back in 2020)  He was recently seen in clinic where he complained of abdominal pain and dark stools w constipation back in May. He presents to [facility] ER today weakness and fatigue, moderate and + Black stools for three days, on coumadin secondary to warfarin use in the setting of an LVAD. Patient has required transfusions for GIB in the past. Mostly recently back in 2021 pt had anemia with dark stools. No interventions was done at that time. However Last Endoscopy was done in 2020 (negative). Today labs show patient is anemic with H/H of 4.5/16.3,. INR is 8.84 MAP in the 90s, Temp 35.1. He denies any chest pain, shortness of breath, dizziness, abd pain, nausea or vomiting.       (2021 16:57)      Surgery/Hospital Course:   admit for melena w/ anemia, INR 8.84   6/15 Capsul study (+) for small bowel bleed, balloon endoscopy (old blood in prox ileum); post EGD - septic w/ L opacity, re-intubated for concern for aspiration, TTE (Mod MR, decrease biV w/ interventricular septum boweing towards R)   bronch    TC    CT C/A/P: Fluid filled colon which may be 2/2 rapid transit. Small bilateral pleffs with associates. Compressive atelectasis New ISABELLE & LLL  parenchymal opacities, suspicious for pneumonia. 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    #8 Ricky pritchard at bedside    CPAP trials    LVAD speed increased to 9200   Bronch; Central line dc'ed;     Today:  Plan for 14hrs on trach collar and rest for 10hrs on the vent today/tonight, continue as tolerated. OOBTC today, continue ambulation as tolerated. : C.diff positive, will continue Vancomycin for C.diff     ICU Vital Signs Last 24 Hrs  T(C): 37.1 (2021 08:00), Max: 37.1 (2021 08:00)  T(F): 98.7 (2021 08:00), Max: 98.7 (2021 08:00)  HR: 114 (2021 08:49) (88 - 115)  BP: --  BP(mean): --  ABP: 95/61 (2021 08:00) (81/53 - 132/76)  ABP(mean): 75 (2021 08:00) (63 - 99)  RR: 20 (2021 08:49) (12 - 88)  SpO2: 100% (2021 08:49) (96% - 100%)      Physical Exam:  Gen:  Awake, alert   CNS: non focal 	  Neck: no JVD  RES : clear , no wheezing              CVS: Regular  rhythm. Normal S1/S2  Abd: Soft, non-distended. Bowel sounds present.  Skin: No rash.  Ext:  no edema    ============================I/O===========================   I&O's Detail    2021 07:01  -  2021 07:00  --------------------------------------------------------  IN:    Enteral Tube Flush: 30 mL    Heparin: 209 mL    IV PiggyBack: 100 mL    Miscellaneous Tube Feedin mL  Total IN: 1599 mL    OUT:    Indwelling Catheter - Urethral (mL): 150 mL    sodium chloride 0.9%: 0 mL    Voided (mL): 1250 mL  Total OUT: 1400 mL    Total NET: 199 mL      2021 07:01  -  2021 08:51  --------------------------------------------------------  IN:    Heparin: 11 mL    Miscellaneous Tube Feedin mL  Total IN: 71 mL    OUT:  Total OUT: 0 mL    Total NET: 71 mL        ============================ LABS =========================                        9.6    11.54 )-----------( 349      ( 2021 00:29 )             31.9     07-04    135  |  96  |  8   ----------------------------<  132<H>  3.7   |  25  |  0.65    Ca    9.4      2021 00:28  Phos  3.1     07-  Mg     1.8     -    TPro  8.1  /  Alb  3.8  /  TBili  0.3  /  DBili  x   /  AST  18  /  ALT  11  /  AlkPhos  127<H>  07-04    LIVER FUNCTIONS - ( 2021 00:28 )  Alb: 3.8 g/dL / Pro: 8.1 g/dL / ALK PHOS: 127 U/L / ALT: 11 U/L / AST: 18 U/L / GGT: x           PT/INR - ( 2021 14:12 )   PT: 15.4 sec;   INR: 1.30 ratio         PTT - ( 2021 00:29 )  PTT:51.0 sec  ABG - ( 2021 00:20 )  pH, Arterial: 7.41  pH, Blood: x     /  pCO2: 51    /  pO2: 137   / HCO3: 31    / Base Excess: 5.7   /  SaO2: 99          ======================Micro/Rad/Cardio=================  Culture: Reviewed   CXR: Reviewed  Echo: Reviewed  ======================================================  PAST MEDICAL & SURGICAL HISTORY:  CHF (congestive heart failure)    CAD (coronary artery disease)    Depression    Pleural effusion    History of 2019 novel coronavirus disease (COVID-19)  2020    Hemorrhoids    Bleeding hemorrhoids    Peripheral arterial disease    Claudication    BPH with urinary obstruction    ACC/AHA stage D systolic heart failure    Anticoagulation goal of INR 2.0 to 2.5    Falls    Clavicle fracture    CKD (chronic kidney disease), stage III    Iron deficiency anemia    H/O epistaxis    Vertigo    GI bleed    S/P TVR (tricuspid valve repair)    S/P ventricular assist device    S/P endoscopy      ====================ASSESSMENT ==============  Stage D Nonischemic Cardiomyopathy, Status Post HM2 on 2017   Recent driveline infection on 2021   Cardiogenic shock  Hemodynamic instability   Hypovolemic shock  Acute hypoxemic respiratory failure  Aspiration pneumonia  Septic shock  GI bleed , Status Post Enteroscopy   Anemia, in setting of melena   Chronic Kidney Disease  Leukocytosis  Coagulopathy   Stress hyperglycemia   C.diff positive on    Fevers    Plan:  ====================== NEUROLOGY=====================  Continue to monitor neuro status per ICU protocol.   Continue Tylenol PRN for analgesia  Clonazepam for agitation   OOBTC today, continue ambulation as tolerated     acetaminophen    Suspension .. 650 milliGRAM(s) Oral every 6 hours PRN Temp greater or equal to 38C (100.4F)  clonazePAM  Tablet 0.5 milliGRAM(s) Oral at bedtime  ==================== RESPIRATORY======================  7/1 Patient had a moderate amount of thin secretions which required intermittent suctioning. Secretions were increased with associated coughing after several hours on trach collar.   Respiratory status required full ventilatory support with intermittent weaning to pressure support and trach collar, close monitoring of respiratory rate and breathing pattern, the following of ABG’s with A-line monitoring, continuous pulse oximetry monitoring.   7/2 Pt desatted after being on trach collar from 6am-11:30pm.   Plan to have to patient on trach collar for 14 hours, and 10 hours on vent.     Mechanical Ventilation:  Mode: [id number]    ipratropium    for Nebulization 500 MICROGram(s) Nebulizer every 6 hours  ====================CARDIOVASCULAR==================  Stage D Nonischemic Cardiomyopathy, Status Post HM2 on 2017   Cardiogenic shock  Hemodynamic instability   Hypovolemic shock  HM2 settings: was initially 8000 but increased to 9000 rpm due to MR and to help with volume overload after transfusion and volume resuscitation.    LVAD speed increased to 9200   Echo 6/15: severe global Left ventricular systolic dysfunction with HM2 in place, decreased Right ventricular systolic function, interventricular septums bows slightly to right, otherwise grossly similar to prior.   Rate and rhythm control with Amiodarone.   Invasive hemodynamic monitoring, assess perfusion indices.   Pressor support with Hydralazine.     aMIOdarone    Tablet 200 milliGRAM(s) Oral daily  hydrALAZINE 10 milliGRAM(s) Oral every 8 hours PRN MAP greater than 85  ===================HEMATOLOGIC/ONC ===================  Anemia due to acute blood loss associated with small bowel bleed.   Monitor H&H/Plts  IV heparin for LVAD, and venous thromboembolism prophylaxis  in addition to sequential compression devices    heparin  Infusion 400 Unit(s)/Hr (11 mL/Hr) IV Continuous <Continuous>  ===================== RENAL =========================  Continue to monitor I/Os, BUN/Creatinine, and urine output.   Goal net negative fluid balance. Replete lytes PRN. Keep K> 4 and Mg >2.     ==================== GASTROINTESTINAL===================  Tolerating Vital AF, @ goal 60cc/hr.   Continue Protonix for stress ulcer prophylaxis.   Continue with Scopolamine for nausea.    pantoprazole  Injectable 40 milliGRAM(s) IV Push daily  sodium chloride 0.9%. 1000 milliLiter(s) (10 mL/Hr) IV Continuous <Continuous>  scopolamine 1 mG/72 Hr(s) Patch 1 Patch Transdermal every 72 hours  =======================    ENDOCRINE  =====================  Hx of DMT2, glycemic control with Admelog sliding scale and Glucagon PRN.   Monitor blood glucose levels.     insulin lispro (ADMELOG) corrective regimen sliding scale   SubCutaneous every 6 hours  ========================INFECTIOUS DISEASE================  Temperature 98.7F, Leukocytosis downtrending w/ WBC 13.10 -> 11.54  Monitor temperature and trend WBC  : C.diff positive, will continue Vancomycin for C.diff   Continue cefazolin for bacteremia     ceFAZolin   IVPB 2000 milliGRAM(s) IV Intermittent every 8 hours  vancomycin    Solution 500 milliGRAM(s) Oral every 6 hours      I have spent 35 minutes providing acute care for this critically ill patient     Patient requires continuous monitoring with bedside rhythm monitoring, pulse ox monitoring, and intermittent blood gas analysis. Care plan discussed with ICU care team. Patient remained critical and at risk for life threatening decompensation.     By signing my name below, I, Brian Robin, attest that this documentation has been prepared under the direction and in the presence of Kota Thomas MD   Electronically signed: Cesia Gonzalez, 21 @ 08:51    I, Kota Thomas, personally performed the services described in this documentation. all medical record entries made by the luisibmel were at my direction and in my presence. I have reviewed the chart and agree that the record reflects my personal performance and is accurate and complete  Electronically signed: Kota Thomas MD

## 2021-07-04 NOTE — PROGRESS NOTE ADULT - SUBJECTIVE AND OBJECTIVE BOX
RICKY JOINT  MRN#: [id number]  Subjective:  Pulmonary progress  : recurrent Acute hypoxic respiratory Failure ,aspiration pneumonia, NICM  , chart reviewed and H/O obtained radiological and Laboratory study reviewed patient Examined     65M PMH ACC/AHA stage D HF due to NICM HM2 LVAD , TV annuloplasty ring 17 as destination therapy due to severe peripheral artery disease with significant stenosis  SIADH, Depression, CKD-3 with hyperkalemia, past E. coli UTIs, driveline drainage (21) and COVID-19 (back in 2020)  He was recently seen in clinic where he complained of abdominal pain and dark stools w constipation back in May. He presents to [facility] ER today weakness and fatigue, moderate and + Black stools for three days, on coumadin secondary to warfarin use in the setting of an LVAD. Patient has required transfusions for GIB in the past. Mostly recently back in 2021 pt had anemia with dark stools. No interventions was done at that time. However Last Endoscopy was done in 2020 (negative). Today labs show patient is anemic with H/H of 4.5/16.3,. INR is 8.84 MAP in the 90s, Temp 35.1. He denies any chest pain, shortness of breath, dizziness, abd pain, nausea or vomiting. found to have  rectal bleeding underwent endoscopy ,old blood in the proximal ileum ,  develop sepsis with LL opacity given Antibiotics , Extubated , reintubated , Bronchoscopy on Zosyn for LL pneumonia  and Amiodarone S/P TV Annuloplasty , patient remain intubated on full ventilatory support .S/P multiple units of blood transfusion , remain on full ventilatory support on Precedex and propofol , new central IJ line , diarrhea C diff. +ve on po vancomycin and IV Flagyl,  mildly distended belly , fever start on cefepime 2gm q 8 hrs S/P tracheostomy .  new RT Subclavian central line continue on contact  isolation ,still diarrhea on C-diff antibiotics ENT follow up appreciated , trial of C-PAP as tolerated , , copious secretion from trach. site chest x ray left lower lobe pneumonia , tolerating trch. collar 50% FI02 still excessive secretion need pulmonary toilet , on Ancef antibiotic , no more diarrhea back on full support mechanical ventilator , chest x ray show improvement in LLL air space disease, more awake and responsive          (2021 16:57)    PAST MEDICAL & SURGICAL HISTORY:  CHF (congestive heart failure)    CAD (coronary artery disease)  Depression    Pleural effusion    History of 2019 novel coronavirus disease (COVID-19)  2020    Hemorrhoids    Bleeding hemorrhoids    Peripheral arterial disease    Claudication    BPH with urinary obstruction    ACC/AHA stage D systolic heart failure  Anticoagulation goal of INR 2.0 to 2.5    Falls    Clavicle fracture    CKD (chronic kidney disease), stage III    Iron deficiency anemia    H/O epistaxis    Vertigo    GI bleed    S/P TVR (tricuspid valve repair)    S/P ventricular assist device    S/P endoscopy    OBJECTIVE:  ICU Vital Signs Last 24 Hrs  T(C): 38.2 (2021 10:00), Max: 38.5 (2021 12:00)  T(F): 100.8 (2021 10:00), Max: 101.3 (2021 12:00)  HR: 65 (2021 10:00) (61 - 69)  BP: --  BP(mean): --  ABP: 105/67 (2021 10:00) (90/54 - 113/64)  ABP(mean): 77 (2021 10:00) (63 - 77)  RR: 20 (2021 10:00) (19 - 35)  SpO2: 99% (2021 10:00) (96% - 100%)       @ 07: @ 07:00  --------------------------------------------------------  IN: 2693.9 mL / OUT: 1415 mL / NET: 1278.9 mL     @ 07: @ 10:49  --------------------------------------------------------  IN: 420.8 mL / OUT: 115 mL / NET: 305.8 mL  PHYSICAL EXAM:Daily   Elderly male S/P tracheostomy  sedated  on full ventilatory support /40%20/5cm PEEP  0n vasopressin , alternating with trach. collar on IV heparin   Daily Weight in k.4 (2021 00:00)  HEENT:     + NCAT  + EOMI  - Conjuctival edema   - Icterus   - Thrush   - ETT  + NGT/OGT  Neck:         + FROM   RT SC line  JVD     - Nodes     - Masses    + Mid-line trachea + Tracheostomy  Chest: normal findings  Lungs:          + CTA   + Rhonchi    - Rales    - Wheezing + Decreased  LT BS   - Dullness R L  Cardiac:       + S1 + S2    + RRR   - Irregular   - S3  - S4    - Murmurs   - Rub   - Hamman’s sign   Abdomen:    + BS     + Soft    + Non-tender     - Distended    - Organomegaly  - PEG  Extremities:   - Cyanosis U/L   - Clubbing  U/L  + LE/UE Edema   + Capillary refill    + Pulses   Neuro:        - Awake   -  Alert   - Confused   - Lethargic   + Sedated  + Generalized Weakness  Skin:        - Rashes    - Erythema   + Normal incisions   + IV sites intact          HOSPITAL MEDICATIONS: All mediciations reviewed and analyzed  MEDICATIONS  (STANDING):  aMIOdarone    Tablet 200 milliGRAM(s) Oral daily  chlorhexidine 0.12% Liquid 15 milliLiter(s) Oral Mucosa every 12 hours  chlorhexidine 2% Cloths 1 Application(s) Topical <User Schedule>  dexMEDEtomidine Infusion 0.5 MICROgram(s)/kG/Hr (9.81 mL/Hr) IV Continuous <Continuous>  dextrose 50% Injectable 50 milliLiter(s) IV Push every 15 minutes  heparin  Infusion 400 Unit(s)/Hr (12.5 mL/Hr) IV Continuous <Continuous>  HYDROmorphone  Injectable 0.5 milliGRAM(s) IV Push once  insulin lispro (ADMELOG) corrective regimen sliding scale   SubCutaneous every 6 hours  pantoprazole  Injectable 40 milliGRAM(s) IV Push every 12 hours  piperacillin/tazobactam IVPB.. 3.375 Gram(s) IV Intermittent every 8 hours  propofol Infusion 20 MICROgram(s)/kG/Min (9.42 mL/Hr) IV Continuous <Continuous>  sodium chloride 0.9% lock flush 3 milliLiter(s) IV Push every 8 hours  sodium chloride 0.9%. 1000 milliLiter(s) (10 mL/Hr) IV Continuous <Continuous>    MEDICATIONS  (PRN):  acetaminophen    Suspension .. 650 milliGRAM(s) Oral every 6 hours PRN Temp greater or equal to 38C (100.4F)    LABS: All Lab data reviewed and analyzed                                   9.6    11.54 )-----------( 349      ( 2021 00:29 )             31.9   07-    135  |  96  |  8   ----------------------------<  132<H>  3.7   |  25  |  0.65    Ca    9.4      2021 00:28  Phos  3.1     07-04  Mg     1.8     -    TPro  8.1  /  Alb  3.8  /  TBili  0.3  /  DBili  x   /  AST  18  /  ALT  11  /  AlkPhos  127<H>      Ca    8.6      2021 01:10  Phos  2.3     07-  Mg     1.9         TPro  7.1  /  Alb  3.6  /  TBili  0.4  /  DBili  x   /  AST  23  /  ALT  19  /  AlkPhos  139<H>      Ca    8.6      2021 00:40  Phos  1.8     -30  Mg     1.8     -    TPro  6.9  /  Alb  3.1<L>  /  TBili  0.3  /  DBili  x   /  AST  27  /  ALT  24  /  AlkPhos  167<H>                       PTT - ( 2021 04:52 )  PTT:45.2 sec LIVER FUNCTIONS - ( 2021 00:42 )  Alb: 3.4 g/dL / Pro: 6.7 g/dL / ALK PHOS: 213 U/L / ALT: 15 U/L / AST: 24 U/L / GGT: x           RADIOLOGY: - Reviewed and analyzed LLL  pneumonia , LVAD HM2, CT scan of abdomen reviewed result noted

## 2021-07-05 NOTE — PROGRESS NOTE ADULT - SUBJECTIVE AND OBJECTIVE BOX
RICKY HAMPTON  MRN-[id number]  Patient is a 66y old  Male who presents with a chief complaint of Anemia, Supratherapeutic INR, Dark Stools (2021 11:14)    HPI:  65M PMH ACC/AHA stage D HF due to NICM HM2 LVAD , TV annuloplasty ring 17 as destination therapy due to severe peripheral artery disease with significant stenosis  SIADH, Depression, CKD-3 with hyperkalemia, past E. coli UTIs, driveline drainage (21) and COVID-19 (back in 2020)  He was recently seen in clinic where he complained of abdominal pain and dark stools w constipation back in May. He presents to [facility] ER today weakness and fatigue, moderate and + Black stools for three days, on coumadin secondary to warfarin use in the setting of an LVAD. Patient has required transfusions for GIB in the past. Mostly recently back in 2021 pt had anemia with dark stools. No interventions was done at that time. However Last Endoscopy was done in 2020 (negative). Today labs show patient is anemic with H/H of 4.5/16.3,. INR is 8.84 MAP in the 90s, Temp 35.1. He denies any chest pain, shortness of breath, dizziness, abd pain, nausea or vomiting.       (2021 16:57)      Hospital course:   admit for melena w/ anemia, INR 8.84   6/15 Capsul study (+) for small bowel bleed, balloon endoscopy (old blood in prox ileum); post EGD - septic w/ L opacity, re-intubated for concern for aspiration, TTE (Mod MR, decrease biV w/ interventricular septum boweing towards R)   bronch    TC    CT C/A/P: Fluid filled colon which may be 2/2 rapid transit. Small bilateral pleffs with associates. Compressive atelectasis New ISABELLE & LLL  parenchymal opacities, suspicious for pneumonia. 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    #8 Ricky pritchard at bedside    CPAP trials    LVAD speed increased to 9200   Bronch; Central line dc'ed;     Today:  Patient on trach collar for 12 hours, and 12 hours on vent. Patient is now on Aerosol Trach Mask FiO2 40% and the vent. C. Diff sent on  was positive, will receive Vancomycin for antibiotic coverage. Patient started scopolamine for nausea and or vomiting and started Warfarin for anticoagulation measures.     REVIEW OF SYSTEMS:  Unable to obtain, pt is intubated and sedated.       Vital Signs Last 24 Hrs  T(C): 36.8 (2021 16:00), Max: 37.1 (2021 00:00)  T(F): 98.3 (2021 16:00), Max: 98.8 (2021 08:00)  HR: 66 (2021 16:00) (56 - 105)  BP: --  BP(mean): --  RR: 25 (2021 16:00) (11 - 87)  SpO2: 100% (2021 16:00) (90% - 100%)    Physical Exam:  Gen:  Sedated and intubated  CNS: Sedated	  Neck: no JVD  RES : + ENT midline , no wheezing    Chest:   + chest tubes                     CVS: Regular  rhythm. Normal S1/S2  Abd: Soft, non-distended. Bowel sounds present.  Skin: No rash.  Ext:  no edema, A Line    ============================I/O===========================   I&O's Detail    2021 07:01  -  2021 07:00  --------------------------------------------------------  IN:    Albumin 5%  - 250 mL: 750 mL    Dexmedetomidine: 62.7 mL    Enteral Tube Flush: 120 mL    Heparin: 264 mL    IV PiggyBack: 100 mL    Miscellaneous Tube Feedin mL  Total IN: 2676.7 mL    OUT:    Voided (mL): 1240 mL  Total OUT: 1240 mL    Total NET: 1436.7 mL      2021 07:01  -  2021 17:13  --------------------------------------------------------  IN:    Dexmedetomidine: 41.3 mL    Heparin: 110 mL    IV PiggyBack: 50 mL    Miscellaneous Tube Feedin mL  Total IN: 801.3 mL    OUT:    Voided (mL): 400 mL  Total OUT: 400 mL    Total NET: 401.3 mL        ============================ LABS =========================                        9.5    10.90 )-----------( 326      ( 2021 00:40 )             32.6     07-05    134<L>  |  95<L>  |  10  ----------------------------<  124<H>  4.2   |  27  |  0.61    Ca    9.5      2021 00:39  Phos  3.0     07-05  Mg     2.1     07-05    TPro  8.1  /  Alb  3.5  /  TBili  0.3  /  DBili  x   /  AST  17  /  ALT  9<L>  /  AlkPhos  125<H>  07-05    LIVER FUNCTIONS - ( 2021 00:39 )  Alb: 3.5 g/dL / Pro: 8.1 g/dL / ALK PHOS: 125 U/L / ALT: 9 U/L / AST: 17 U/L / GGT: x           PT/INR - ( 2021 14:26 )   PT: 14.1 sec;   INR: 1.18 ratio         PTT - ( 2021 00:40 )  PTT:48.7 sec  ABG - ( 2021 12:44 )  pH, Arterial: 7.40  pH, Blood: x     /  pCO2: 49    /  pO2: 131   / HCO3: 30    / Base Excess: 5.2   /  SaO2: 99                  ======================Micro/Rad/Cardio=================  Culture: Reviewed   CXR: Reviewed  Echo: Reviewed  ======================================================  PAST MEDICAL & SURGICAL HISTORY:  CHF (congestive heart failure)    CAD (coronary artery disease)    Depression    Pleural effusion    History of  novel coronavirus disease (COVID-19)  2020    Hemorrhoids    Bleeding hemorrhoids    Peripheral arterial disease    Claudication    BPH with urinary obstruction    ACC/AHA stage D systolic heart failure    Anticoagulation goal of INR 2.0 to 2.5    Falls    Clavicle fracture    CKD (chronic kidney disease), stage III    Iron deficiency anemia    H/O epistaxis    Vertigo    GI bleed    S/P TVR (tricuspid valve repair)    S/P ventricular assist device    S/P endoscopy      ====================ASSESSMENT ==============  Stage D Nonischemic Cardiomyopathy, Status Post HM2 on 2017   Recent driveline infection on 2021   Cardiogenic shock  Hemodynamic instability   Hypovolemic shock  Acute hypoxemic respiratory failure  Aspiration pneumonia  Septic shock  GI bleed , Status Post Enteroscopy   Anemia, in setting of melena   Chronic Kidney Disease  Coagulopathy   Stress hyperglycemia   C.diff positive on      Plan:  ====================== NEUROLOGY=====================  Sedated with IV Precedex  drip for ventilator synchrony.   Assess neuro status per protocol when pt is off sedation.   Continue Tylenol PRN for analgesia  Clonazepam for agitation       acetaminophen    Suspension .. 650 milliGRAM(s) Oral every 6 hours PRN Temp greater or equal to 38C (100.4F)  clonazePAM  Tablet 0.5 milliGRAM(s) Oral every 8 hours  dexMEDEtomidine Infusion 0.4 MICROgram(s)/kG/Hr (7.85 mL/Hr) IV Continuous <Continuous>      ==================== RESPIRATORY======================  7/1 Patient had a moderate amount of thin secretions which required intermittent suctioning. Secretions were increased with associated coughing after several hours on trach collar.   Respiratory status required full ventilatory support with intermittent weaning to pressure support and trach collar, close monitoring of respiratory rate and breathing pattern, the following of ABG’s with A-line monitoring, continuous pulse oximetry monitoring.    Pt desatted after being on trach collar from 6am-11:30pm.   Patient on trach collar for 12 hours, and 12 hours on vent.   Continue use of bronchodilators.     Mechanical Ventilation:  Mode: ventilator off, pt on trach collar  TV (machine): 4  FiO2: 40    ipratropium    for Nebulization 500 MICROGram(s) Nebulizer every 6 hours    ====================CARDIOVASCULAR==================  Stage D Nonischemic Cardiomyopathy, Status Post HM2 on 2017   Cardiogenic shock  Hemodynamic instability   Hypovolemic shock  HM2 settings: was initially 8000 but increased to 9000 rpm due to MR and to help with volume overload after transfusion and volume resuscitation.    LVAD speed increased to 9200 and flow 4.6  Echo 6/15: severe global Left ventricular systolic dysfunction with HM2 in place, decreased Right ventricular systolic function, interventricular septums bows slightly to right, otherwise grossly similar to prior.   Rate and rhythm control with Amiodarone.   Invasive hemodynamic monitoring, assess perfusion indices.   Pressor support with Hydralazine.     aMIOdarone    Tablet 200 milliGRAM(s) Oral daily  hydrALAZINE 10 milliGRAM(s) Oral every 8 hours PRN MAP greater than 85    ===================HEMATOLOGIC/ONC ===================  Anemia due to acute blood loss associated with small bowel bleed.   Monitor H&H/Plts  IV heparin and Warfarin for LVAD, and venous thromboembolism prophylaxis  in addition to sequential compression devices    heparin  Infusion 400 Unit(s)/Hr (11 mL/Hr) IV Continuous <Continuous>  warfarin 3 milliGRAM(s) Oral once    ===================== RENAL =========================  Continue to monitor I/Os, BUN/Creatinine, and urine output.   Goal net negative fluid balance. Replete lytes PRN. Keep K> 4 and Mg >2.     ==================== GASTROINTESTINAL===================  Tolerating Vital AF, @ goal 60cc/hr.   Continue Protonix for stress ulcer prophylaxis.   Continue with Scopolamine for nausea.    scopolamine 1 mG/72 Hr(s) Patch 1 Patch Transdermal every 72 hours  pantoprazole   Suspension 40 milliGRAM(s) Enteral Tube daily  sodium chloride 0.9%. 1000 milliLiter(s) (10 mL/Hr) IV Continuous <Continuous>    =======================    ENDOCRINE  =====================  Hx of DMT2, glycemic control with Admelog sliding scale and Glucagon PRN.   Monitor blood glucose levels.     insulin lispro (ADMELOG) corrective regimen sliding scale   SubCutaneous every 6 hours    ========================INFECTIOUS DISEASE================  Temperature 98.8F, Leukocytosis downtrending w/ WBC 13.10 -> 11.54-> 10.90  Monitor temperature and trend WBC  : C.diff positive, will continue Vancomycin for C.diff   Continue cefazolin for bacteremia     ceFAZolin   IVPB 2000 milliGRAM(s) IV Intermittent every 8 hours  vancomycin    Solution 500 milliGRAM(s) Oral every 6 hours      Patient requires continuous monitoring with bedside rhythm monitoring, arterial line, pulse oximetry, ventilator monitoring; intermittent blood gas analysis. Care plan discussed with ICU care team. Patient remains critical; required more than usual ICU care.     By signing my name below, Daniela STANLEY, attest that this documentation has been prepared under the direction and in the presence of Simon Santo MD.  Electronically signed: Cesia Suresh, 21 @ 17:13    Simon STANLEY, personally performed the services described in this documentation. all medical record entries made by the scribe were at my direction and in my presence. I have reviewed the chart and agree that the record reflects my personal performance and is accurate and complete  Electronically signed: Daniela Chowdary, 21 @ 17:13       RICKY HAMPTON  MRN-[id number]  Patient is a 66y old  Male who presents with a chief complaint of Anemia, Supratherapeutic INR, Dark Stools (2021 11:14)    HPI:  65M PMH ACC/AHA stage D HF due to NICM HM2 LVAD , TV annuloplasty ring 17 as destination therapy due to severe peripheral artery disease with significant stenosis  SIADH, Depression, CKD-3 with hyperkalemia, past E. coli UTIs, driveline drainage (21) and COVID-19 (back in 2020)  He was recently seen in clinic where he complained of abdominal pain and dark stools w constipation back in May. He presents to [facility] ER today weakness and fatigue, moderate and + Black stools for three days, on coumadin secondary to warfarin use in the setting of an LVAD. Patient has required transfusions for GIB in the past. Mostly recently back in 2021 pt had anemia with dark stools. No interventions was done at that time. However Last Endoscopy was done in 2020 (negative). Today labs show patient is anemic with H/H of 4.5/16.3,. INR is 8.84 MAP in the 90s, Temp 35.1. He denies any chest pain, shortness of breath, dizziness, abd pain, nausea or vomiting.       (2021 16:57)      Hospital course:   admit for melena w/ anemia, INR 8.84   6/15 Capsul study (+) for small bowel bleed, balloon endoscopy (old blood in prox ileum); post EGD - septic w/ L opacity, re-intubated for concern for aspiration, TTE (Mod MR, decrease biV w/ interventricular septum boweing towards R)   bronch    TC    CT C/A/P: Fluid filled colon which may be 2/2 rapid transit. Small bilateral pleffs with associates. Compressive atelectasis New ISABELLE & LLL  parenchymal opacities, suspicious for pneumonia. 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    #8 Ricky pritchard at bedside    CPAP trials    LVAD speed increased to 9200   Bronch; Central line dc'ed;     Today:  Patient on trach collar for 12 hours, and 12 hours on vent. Patient is now on Aerosol Trach Mask FiO2 40% and the vent. C. Diff sent on  was positive, will receive Vancomycin for antibiotic coverage. Patient started scopolamine for nausea and or vomiting and started Warfarin for anticoagulation measures.     REVIEW OF SYSTEMS:  Unable to obtain, pt is intubated and sedated.       Vital Signs Last 24 Hrs  T(C): 36.8 (2021 16:00), Max: 37.1 (2021 00:00)  T(F): 98.3 (2021 16:00), Max: 98.8 (2021 08:00)  HR: 66 (2021 16:00) (56 - 105)  BP: --  BP(mean): --  RR: 25 (2021 16:00) (11 - 87)  SpO2: 100% (2021 16:00) (90% - 100%)    Physical Exam:  Gen:  Sedated and intubated  CNS: Sedated	  Neck: no JVD  RES : + ENT midline , no wheezing    Chest:   + chest tubes                     CVS: Regular  rhythm. Normal S1/S2  Abd: Soft, non-distended. Bowel sounds present.  Skin: No rash.  Ext:  no edema, A Line    ============================I/O===========================   I&O's Detail    2021 07:01  -  2021 07:00  --------------------------------------------------------  IN:    Albumin 5%  - 250 mL: 750 mL    Dexmedetomidine: 62.7 mL    Enteral Tube Flush: 120 mL    Heparin: 264 mL    IV PiggyBack: 100 mL    Miscellaneous Tube Feedin mL  Total IN: 2676.7 mL    OUT:    Voided (mL): 1240 mL  Total OUT: 1240 mL    Total NET: 1436.7 mL      2021 07:01  -  2021 17:13  --------------------------------------------------------  IN:    Dexmedetomidine: 41.3 mL    Heparin: 110 mL    IV PiggyBack: 50 mL    Miscellaneous Tube Feedin mL  Total IN: 801.3 mL    OUT:    Voided (mL): 400 mL  Total OUT: 400 mL    Total NET: 401.3 mL        ============================ LABS =========================                        9.5    10.90 )-----------( 326      ( 2021 00:40 )             32.6     07-05    134<L>  |  95<L>  |  10  ----------------------------<  124<H>  4.2   |  27  |  0.61    Ca    9.5      2021 00:39  Phos  3.0     07-05  Mg     2.1     07-05    TPro  8.1  /  Alb  3.5  /  TBili  0.3  /  DBili  x   /  AST  17  /  ALT  9<L>  /  AlkPhos  125<H>  07-05    LIVER FUNCTIONS - ( 2021 00:39 )  Alb: 3.5 g/dL / Pro: 8.1 g/dL / ALK PHOS: 125 U/L / ALT: 9 U/L / AST: 17 U/L / GGT: x           PT/INR - ( 2021 14:26 )   PT: 14.1 sec;   INR: 1.18 ratio         PTT - ( 2021 00:40 )  PTT:48.7 sec  ABG - ( 2021 12:44 )  pH, Arterial: 7.40  pH, Blood: x     /  pCO2: 49    /  pO2: 131   / HCO3: 30    / Base Excess: 5.2   /  SaO2: 99                  ======================Micro/Rad/Cardio=================  Culture: Reviewed   CXR: Reviewed  Echo: Reviewed  ======================================================  PAST MEDICAL & SURGICAL HISTORY:  CHF (congestive heart failure)    CAD (coronary artery disease)    Depression    Pleural effusion    History of  novel coronavirus disease (COVID-19)  2020    Hemorrhoids    Bleeding hemorrhoids    Peripheral arterial disease    Claudication    BPH with urinary obstruction    ACC/AHA stage D systolic heart failure    Anticoagulation goal of INR 2.0 to 2.5    Falls    Clavicle fracture    CKD (chronic kidney disease), stage III    Iron deficiency anemia    H/O epistaxis    Vertigo    GI bleed    S/P TVR (tricuspid valve repair)    S/P ventricular assist device    S/P endoscopy      ====================ASSESSMENT ==============  Stage D Nonischemic Cardiomyopathy, Status Post HM2 on 2017   Recent driveline infection on 2021   Cardiogenic shock  Hemodynamic instability   Acute hypoxemic respiratory failure  GI bleed , Status Post Enteroscopy   Anemia, in setting of melena   Chronic Kidney Disease  Stress hyperglycemia   C.diff positive on      Plan:  ====================== NEUROLOGY=====================  Sedated with IV Precedex  drip for ventilator synchrony.   Assess neuro status per protocol when pt is off sedation.   Continue Tylenol PRN for analgesia  Clonazepam for agitation       acetaminophen    Suspension .. 650 milliGRAM(s) Oral every 6 hours PRN Temp greater or equal to 38C (100.4F)  clonazePAM  Tablet 0.5 milliGRAM(s) Oral every 8 hours  dexMEDEtomidine Infusion 0.4 MICROgram(s)/kG/Hr (7.85 mL/Hr) IV Continuous <Continuous>      ==================== RESPIRATORY======================  7/1 Patient had a moderate amount of thin secretions which required intermittent suctioning. Secretions were increased with associated coughing after several hours on trach collar.   Respiratory status required full ventilatory support with intermittent weaning to pressure support and trach collar, close monitoring of respiratory rate and breathing pattern, the following of ABG’s with A-line monitoring, continuous pulse oximetry monitoring.    Pt desatted after being on trach collar from 6am-11:30pm.   Patient on trach collar for 12 hours, and 12 hours on vent.   Continue use of bronchodilators.     Mechanical Ventilation:  Mode: ventilator off, pt on trach collar  TV (machine): 4  FiO2: 40    ipratropium    for Nebulization 500 MICROGram(s) Nebulizer every 6 hours    ====================CARDIOVASCULAR==================  Stage D Nonischemic Cardiomyopathy, Status Post HM2 on 2017   Cardiogenic shock  Hemodynamic instability   Hypovolemic shock  HM2 settings: was initially 8000 but increased to 9000 rpm due to MR and to help with volume overload after transfusion and volume resuscitation.    LVAD speed increased to 9200 and flow 4.6  Echo 6/15: severe global Left ventricular systolic dysfunction with HM2 in place, decreased Right ventricular systolic function, interventricular septums bows slightly to right, otherwise grossly similar to prior.   Rate and rhythm control with Amiodarone.   Invasive hemodynamic monitoring, assess perfusion indices.   Pressor support with Hydralazine.     aMIOdarone    Tablet 200 milliGRAM(s) Oral daily  hydrALAZINE 10 milliGRAM(s) Oral every 8 hours PRN MAP greater than 85    ===================HEMATOLOGIC/ONC ===================  Anemia due to acute blood loss associated with small bowel bleed.   Monitor H&H/Plts  IV heparin and Warfarin for LVAD, and venous thromboembolism prophylaxis  in addition to sequential compression devices    heparin  Infusion 400 Unit(s)/Hr (11 mL/Hr) IV Continuous <Continuous>  warfarin 3 milliGRAM(s) Oral once    ===================== RENAL =========================  Continue to monitor I/Os, BUN/Creatinine, and urine output.   Goal net negative fluid balance. Replete lytes PRN. Keep K> 4 and Mg >2.     ==================== GASTROINTESTINAL===================  Tolerating Vital AF, @ goal 60cc/hr.   Continue Protonix for stress ulcer prophylaxis.   Continue with Scopolamine for nausea.    scopolamine 1 mG/72 Hr(s) Patch 1 Patch Transdermal every 72 hours  pantoprazole   Suspension 40 milliGRAM(s) Enteral Tube daily  sodium chloride 0.9%. 1000 milliLiter(s) (10 mL/Hr) IV Continuous <Continuous>    =======================    ENDOCRINE  =====================  Hx of DMT2, glycemic control with Admelog sliding scale and Glucagon PRN.   Monitor blood glucose levels.     insulin lispro (ADMELOG) corrective regimen sliding scale   SubCutaneous every 6 hours    ========================INFECTIOUS DISEASE================  Temperature 98.8F, Leukocytosis downtrending w/ WBC 13.10 -> 11.54-> 10.90  Monitor temperature and trend WBC  : C.diff positive, will continue Vancomycin for C.diff   Continue cefazolin for bacteremia     ceFAZolin   IVPB 2000 milliGRAM(s) IV Intermittent every 8 hours  vancomycin    Solution 500 milliGRAM(s) Oral every 6 hours      Patient requires continuous monitoring with bedside rhythm monitoring, arterial line, pulse oximetry, ventilator monitoring; intermittent blood gas analysis. Care plan discussed with ICU care team. Patient remains critical; required more than usual ICU care.  time spent 35 minutes    By signing my name below, IDaniela, attest that this documentation has been prepared under the direction and in the presence of Simon Santo MD.  Electronically signed: Cesai Suresh, 21 @ 17:13    Simon STANLEY, personally performed the services described in this documentation. all medical record entries made by the scribe were at my direction and in my presence. I have reviewed the chart and agree that the record reflects my personal performance and is accurate and complete  Electronically signed: Daniela Chowdary, 21 @ 17:13

## 2021-07-05 NOTE — PROGRESS NOTE ADULT - SUBJECTIVE AND OBJECTIVE BOX
RICKY JOINT  MRN#: [id number]  Subjective:  Pulmonary progress  : recurrent Acute hypoxic respiratory Failure ,aspiration pneumonia, NICM  , chart reviewed and H/O obtained radiological and Laboratory study reviewed patient Examined     65M PMH ACC/AHA stage D HF due to NICM HM2 LVAD , TV annuloplasty ring 17 as destination therapy due to severe peripheral artery disease with significant stenosis  SIADH, Depression, CKD-3 with hyperkalemia, past E. coli UTIs, driveline drainage (21) and COVID-19 (back in 2020)  He was recently seen in clinic where he complained of abdominal pain and dark stools w constipation back in May. He presents to [facility] ER today weakness and fatigue, moderate and + Black stools for three days, on coumadin secondary to warfarin use in the setting of an LVAD. Patient has required transfusions for GIB in the past. Mostly recently back in 2021 pt had anemia with dark stools. No interventions was done at that time. However Last Endoscopy was done in 2020 (negative). Today labs show patient is anemic with H/H of 4.5/16.3,. INR is 8.84 MAP in the 90s, Temp 35.1. He denies any chest pain, shortness of breath, dizziness, abd pain, nausea or vomiting. found to have  rectal bleeding underwent endoscopy ,old blood in the proximal ileum ,  develop sepsis with LL opacity given Antibiotics , Extubated , reintubated , Bronchoscopy on Zosyn for LL pneumonia  and Amiodarone S/P TV Annuloplasty , patient remain intubated on full ventilatory support .S/P multiple units of blood transfusion , remain on full ventilatory support on Precedex and propofol , new central IJ line , diarrhea C diff. +ve on po vancomycin and IV Flagyl,  mildly distended belly , fever start on cefepime 2gm q 8 hrs S/P tracheostomy .  new RT Subclavian central line continue on contact  isolation ,still diarrhea on C-diff antibiotics ENT follow up appreciated , trial of C-PAP as tolerated , , copious secretion from trach. site chest x ray left lower lobe pneumonia , tolerating trch. collar 50% FI02 still excessive secretion need pulmonary toilet , on Ancef antibiotic , no more diarrhea back on full support mechanical ventilator , chest x ray show improvement in LLL air space disease, more awake and responsive on tube feeding no more diarrhea , afebrile          (2021 16:57)    PAST MEDICAL & SURGICAL HISTORY:  CHF (congestive heart failure)    CAD (coronary artery disease)  Depression    Pleural effusion    History of 2019 novel coronavirus disease (COVID-19)  2020    Hemorrhoids    Bleeding hemorrhoids    Peripheral arterial disease    Claudication    BPH with urinary obstruction    ACC/AHA stage D systolic heart failure  Anticoagulation goal of INR 2.0 to 2.5    Falls    Clavicle fracture    CKD (chronic kidney disease), stage III    Iron deficiency anemia    H/O epistaxis    Vertigo    GI bleed    S/P TVR (tricuspid valve repair)    S/P ventricular assist device    S/P endoscopy    OBJECTIVE:  ICU Vital Signs Last 24 Hrs  T(C): 38.2 (2021 10:00), Max: 38.5 (2021 12:00)  T(F): 100.8 (2021 10:00), Max: 101.3 (2021 12:00)  HR: 65 (2021 10:00) (61 - 69)  BP: --  BP(mean): --  ABP: 105/67 (2021 10:00) (90/54 - 113/64)  ABP(mean): 77 (2021 10:00) (63 - 77)  RR: 20 (2021 10:00) (19 - 35)  SpO2: 99% (2021 10:00) (96% - 100%)       @ 07: @ 07:00  --------------------------------------------------------  IN: 2693.9 mL / OUT: 1415 mL / NET: 1278.9 mL     @ 07:  -   @ 10:49  --------------------------------------------------------  IN: 420.8 mL / OUT: 115 mL / NET: 305.8 mL  PHYSICAL EXAM:Daily   Elderly male S/P tracheostomy  sedated  on full ventilatory support /40%20/5cm PEEP  0n vasopressin , alternating with trach. collar on IV heparin   Daily Weight in k.4 (2021 00:00)  HEENT:     + NCAT  + EOMI  - Conjuctival edema   - Icterus   - Thrush   - ETT  + NGT/OGT  Neck:         + FROM   RT SC line  JVD     - Nodes     - Masses    + Mid-line trachea + Tracheostomy  Chest: normal findings  Lungs:          + CTA   + Rhonchi    - Rales    - Wheezing + Decreased  LT BS   - Dullness R L  Cardiac:       + S1 + S2    + RRR   - Irregular   - S3  - S4    - Murmurs   - Rub   - Hamman’s sign   Abdomen:    + BS     + Soft    + Non-tender     - Distended    - Organomegaly  - PEG  Extremities:   - Cyanosis U/L   - Clubbing  U/L  + LE/UE Edema   + Capillary refill    + Pulses   Neuro:        - Awake   -  Alert   - Confused   - Lethargic   + Sedated  + Generalized Weakness  Skin:        - Rashes    - Erythema   + Normal incisions   + IV sites intact          HOSPITAL MEDICATIONS: All mediciations reviewed and analyzed  MEDICATIONS  (STANDING):  aMIOdarone    Tablet 200 milliGRAM(s) Oral daily  chlorhexidine 0.12% Liquid 15 milliLiter(s) Oral Mucosa every 12 hours  chlorhexidine 2% Cloths 1 Application(s) Topical <User Schedule>  dexMEDEtomidine Infusion 0.5 MICROgram(s)/kG/Hr (9.81 mL/Hr) IV Continuous <Continuous>  dextrose 50% Injectable 50 milliLiter(s) IV Push every 15 minutes  heparin  Infusion 400 Unit(s)/Hr (12.5 mL/Hr) IV Continuous <Continuous>  HYDROmorphone  Injectable 0.5 milliGRAM(s) IV Push once  insulin lispro (ADMELOG) corrective regimen sliding scale   SubCutaneous every 6 hours  pantoprazole  Injectable 40 milliGRAM(s) IV Push every 12 hours  piperacillin/tazobactam IVPB.. 3.375 Gram(s) IV Intermittent every 8 hours  propofol Infusion 20 MICROgram(s)/kG/Min (9.42 mL/Hr) IV Continuous <Continuous>  sodium chloride 0.9% lock flush 3 milliLiter(s) IV Push every 8 hours  sodium chloride 0.9%. 1000 milliLiter(s) (10 mL/Hr) IV Continuous <Continuous>    MEDICATIONS  (PRN):  acetaminophen    Suspension .. 650 milliGRAM(s) Oral every 6 hours PRN Temp greater or equal to 38C (100.4F)    LABS: All Lab data reviewed and analyzed                                    9.5    10.90 )-----------( 326      ( 2021 00:40 )             32.6              07-05    134<L>  |  95<L>  |  10  ----------------------------<  124<H>  4.2   |  27  |  0.61    Ca    9.5      2021 00:39  Phos  3.0     07-05  Mg     2.1     07-    TPro  8.1  /  Alb  3.5  /  TBili  0.3  /  DBili  x   /  AST  17  /  ALT  9<L>  /  AlkPhos  125<H>      Ca    9.4      2021 00:28  Phos  3.1     07-04  Mg     1.8     -    TPro  8.1  /  Alb  3.8  /  TBili  0.3  /  DBili  x   /  AST  18  /  ALT  11  /  AlkPhos  127<H>      Ca    8.6      2021 01:10  Phos  2.3     07-  Mg     1.9     -    TPro  7.1  /  Alb  3.6  /  TBili  0.4  /  DBili  x   /  AST  23  /  ALT  19  /  AlkPhos  139<H>                         PTT - ( 2021 04:52 )  PTT:45.2 sec LIVER FUNCTIONS - ( 2021 00:42 )  Alb: 3.4 g/dL / Pro: 6.7 g/dL / ALK PHOS: 213 U/L / ALT: 15 U/L / AST: 24 U/L / GGT: x           RADIOLOGY: - Reviewed and analyzed LLL  pneumonia , LVAD HM2, CT scan of abdomen reviewed result noted

## 2021-07-06 NOTE — PROGRESS NOTE ADULT - SUBJECTIVE AND OBJECTIVE BOX
Subjective:  No overnight events. Walked with PT today.     Medications:  acetaminophen    Suspension .. 650 milliGRAM(s) Oral every 6 hours PRN  aMIOdarone    Tablet 200 milliGRAM(s) Oral daily  ceFAZolin   IVPB 2000 milliGRAM(s) IV Intermittent every 8 hours  chlorhexidine 0.12% Liquid 15 milliLiter(s) Oral Mucosa every 12 hours  chlorhexidine 2% Cloths 1 Application(s) Topical <User Schedule>  clonazePAM  Tablet 0.5 milliGRAM(s) Oral every 8 hours  dexMEDEtomidine Infusion 0.4 MICROgram(s)/kG/Hr IV Continuous <Continuous>  heparin  Infusion 400 Unit(s)/Hr IV Continuous <Continuous>  hydrALAZINE 10 milliGRAM(s) Oral every 8 hours PRN  insulin lispro (ADMELOG) corrective regimen sliding scale   SubCutaneous every 6 hours  ipratropium    for Nebulization 500 MICROGram(s) Nebulizer every 6 hours  pantoprazole   Suspension 40 milliGRAM(s) Enteral Tube daily  scopolamine 1 mG/72 Hr(s) Patch 1 Patch Transdermal every 72 hours  sodium chloride 0.9%. 1000 milliLiter(s) IV Continuous <Continuous>  vancomycin    Solution 500 milliGRAM(s) Oral every 6 hours  warfarin 5 milliGRAM(s) Oral once    Vitals:  T(C): 36.4 (21 @ 16:00), Max: 36.4 (21 @ 08:00)  HR: 108 (21 @ 17:00) (76 - 129)  BP: --  BP(mean): --  RR: 19 (21 @ 17:00) (12 - 48)  SpO2: 98% (21 @ 17:00) (97% - 100%)    Daily     Daily Weight in k.8 (2021 00:00)        I&O's Summary    2021 07:  -  2021 07:00  --------------------------------------------------------  IN: 1804.3 mL / OUT: 1650 mL / NET: 154.3 mL    2021 07: 17:32  --------------------------------------------------------  IN: 855.7 mL / OUT: 525 mL / NET: 330.7 mL        Physical Exam:  Appearance: No Acute Distress  HEENT: s/p trach, JVP mildly elevated   Cardiovascular: typical VAD hums   Respiratory: scattered rhonchi  Gastrointestinal: Soft, mildly tender, non-distended	  Skin: no skin lesions  Neurologic: Non-focal  Extremities: No LE edema, warm and well perfused  Psychiatry: blunted affect     LVAD interrogation   Flow: 5.4  Speed: 9200  PI: 6.3  Power: 5.6   Alarms: 2 LF alarms 7/5 AM  Changes to device programming: none       Labs:                        13.0   6.83  )-----------( 214      ( 2021 23:35 )             43.0     07-05    134<L>  |  97  |  12  ----------------------------<  135<H>  4.0   |  26  |  0.64    Ca    9.6      2021 23:35  Phos  3.3     07-05  Mg     2.0     07-05    TPro  8.3  /  Alb  4.0  /  TBili  0.3  /  DBili  x   /  AST  22  /  ALT  9<L>  /  AlkPhos  111  07-05      PT/INR - ( 2021 14:04 )   PT: 13.7 sec;   INR: 1.15 ratio         PTT - ( 2021 14:04 )  PTT:41.2 sec          Lactate Dehydrogenase, Serum: 292 U/L ( @ 23:35)  Lactate Dehydrogenase, Serum: 320 U/L ( @ 00:39)  Lactate Dehydrogenase, Serum: 311 U/L ( @ 00:28)

## 2021-07-06 NOTE — PROGRESS NOTE ADULT - ASSESSMENT
Assessment and Recommendation:   · Assessment	  Assessment and recommendation :  Recurrent Acute hypoxic respiratory Failure S/P tracheostomy on full ventilatory support alternating with trach. collar at 50% Fi02  Acute left lower lobe pneumonia  possible Aspiration pneumonia   Diarrhea +ve for C-diff. colitis on PO vancomycin and IV Flagyl     on Ancef improved for bacteremia   Non ischemic cardiomyopathy continue ACE inhibitor and B-Blockers   S/P Septic shock and cardiogenic shock   Stage D systolic heart failure S/P LVAD HM2   MH2 LVAD  with  TV Annuloplasty  on IV Argatroban for LVAD   Severe peripheral vascular disease   S/P Anion gap metabolic acidosis  severe hyperglycemia on insulin coverage    On  Amiodarone and IV heparin   critical care polyneuropathy   Anemia of Acute blood Loss   S/P Small bowel bleeding   S/P blood and FFP transfusion   Chronic kidney disease stage III  Continue NGT feeding   GI prophylaxis  discuss with TCV surgeon   critical care time spend is 35 minutes

## 2021-07-06 NOTE — PROGRESS NOTE ADULT - PROBLEM SELECTOR PLAN 2
-Current speed 9200 RPM to better unload ventricle.   -Defer diuretics at this time   -c/w heparin with coumadin bridge for goal INR 2-3   -Will plan to hold aspirin indefinitely given recurrent GIB.  -Continue to monitor LDH daily.

## 2021-07-06 NOTE — PROGRESS NOTE ADULT - SUBJECTIVE AND OBJECTIVE BOX
INFECTIOUS DISEASES FOLLOW UP-- Anitra Prater  [phone]    This is a follow up note for this  66yMale with  Anemia        ROS:  CONSTITUTIONAL:  No fever, good appetite  CARDIOVASCULAR:  No chest pain or palpitations  RESPIRATORY:  No dyspnea  GASTROINTESTINAL:  No nausea, vomiting, diarrhea, or abdominal pain  GENITOURINARY:  No dysuria  NEUROLOGIC:  No headache,     Allergies    No Known Allergies    Intolerances        ANTIBIOTICS/RELEVANT:  antimicrobials  ceFAZolin   IVPB 2000 milliGRAM(s) IV Intermittent every 8 hours  vancomycin    Solution 500 milliGRAM(s) Oral every 6 hours    immunologic:    OTHER:  acetaminophen    Suspension .. 650 milliGRAM(s) Oral every 6 hours PRN  aMIOdarone    Tablet 200 milliGRAM(s) Oral daily  chlorhexidine 0.12% Liquid 15 milliLiter(s) Oral Mucosa every 12 hours  chlorhexidine 2% Cloths 1 Application(s) Topical <User Schedule>  clonazePAM  Tablet 0.5 milliGRAM(s) Oral every 8 hours  dexMEDEtomidine Infusion 0.4 MICROgram(s)/kG/Hr IV Continuous <Continuous>  heparin  Infusion 400 Unit(s)/Hr IV Continuous <Continuous>  hydrALAZINE 10 milliGRAM(s) Oral every 8 hours PRN  insulin lispro (ADMELOG) corrective regimen sliding scale   SubCutaneous every 6 hours  ipratropium    for Nebulization 500 MICROGram(s) Nebulizer every 6 hours  pantoprazole   Suspension 40 milliGRAM(s) Enteral Tube daily  scopolamine 1 mG/72 Hr(s) Patch 1 Patch Transdermal every 72 hours  sodium chloride 0.9%. 1000 milliLiter(s) IV Continuous <Continuous>  warfarin 5 milliGRAM(s) Oral once      Objective:  Vital Signs Last 24 Hrs  T(C): 36.4 (06 Jul 2021 16:00), Max: 36.4 (06 Jul 2021 08:00)  T(F): 97.6 (06 Jul 2021 16:00), Max: 97.6 (06 Jul 2021 08:00)  HR: 86 (06 Jul 2021 18:16) (76 - 129)  BP: --  BP(mean): --  RR: 21 (06 Jul 2021 18:00) (12 - 43)  SpO2: 100% (06 Jul 2021 18:16) (97% - 100%)    PHYSICAL EXAM:  Constitutional:no acute distress  Eyes:ALONSO, EOMI  Ear/Nose/Throat: no oral lesions, 	  Respiratory: clear BL  Cardiovascular: S1S2  Gastrointestinal:soft, (+) BS, no tenderness  Extremities:no e/e/c  No Lymphadenopathy  IV sites not inflammed.    LABS:                        13.0   6.83  )-----------( 214      ( 05 Jul 2021 23:35 )             43.0     07-05    134<L>  |  97  |  12  ----------------------------<  135<H>  4.0   |  26  |  0.64    Ca    9.6      05 Jul 2021 23:35  Phos  3.3     07-05  Mg     2.0     07-05    TPro  8.3  /  Alb  4.0  /  TBili  0.3  /  DBili  x   /  AST  22  /  ALT  9<L>  /  AlkPhos  111  07-05    PT/INR - ( 06 Jul 2021 14:04 )   PT: 13.7 sec;   INR: 1.15 ratio         PTT - ( 06 Jul 2021 14:04 )  PTT:41.2 sec      MICROBIOLOGY:            RECENT CULTURES:  06-30 @ 20:57  .Sputum Sputum  --  --  --    Normal Respiratory Bria present  --      RADIOLOGY & ADDITIONAL STUDIES:  < from: Xray Abdomen 1 View PORTABLE -Urgent (Xray Abdomen 1 View PORTABLE -Urgent .) (07.05.21 @ 23:40) >  IMPRESSION:  Air-filled loops of bowel without evidence of obstruction given limitations of study.    < end of copied text >   INFECTIOUS DISEASES FOLLOW UP-- Anitra Prater  [phone]    This is a follow up note for this  66yMale with  Anemia        ROS:  CONSTITUTIONAL:  No fever, good appetite  CARDIOVASCULAR:  No chest pain or palpitations  RESPIRATORY:  No dyspnea  GASTROINTESTINAL:  No nausea, vomiting, diarrhea, or abdominal pain  GENITOURINARY:  No dysuria  NEUROLOGIC:  No headache,     Allergies    No Known Allergies    Intolerances        ANTIBIOTICS/RELEVANT:  antimicrobials  ceFAZolin   IVPB 2000 milliGRAM(s) IV Intermittent every 8 hours  vancomycin    Solution 500 milliGRAM(s) Oral every 6 hours    immunologic:    OTHER:  acetaminophen    Suspension .. 650 milliGRAM(s) Oral every 6 hours PRN  aMIOdarone    Tablet 200 milliGRAM(s) Oral daily  chlorhexidine 0.12% Liquid 15 milliLiter(s) Oral Mucosa every 12 hours  chlorhexidine 2% Cloths 1 Application(s) Topical <User Schedule>  clonazePAM  Tablet 0.5 milliGRAM(s) Oral every 8 hours  dexMEDEtomidine Infusion 0.4 MICROgram(s)/kG/Hr IV Continuous <Continuous>  heparin  Infusion 400 Unit(s)/Hr IV Continuous <Continuous>  hydrALAZINE 10 milliGRAM(s) Oral every 8 hours PRN  insulin lispro (ADMELOG) corrective regimen sliding scale   SubCutaneous every 6 hours  ipratropium    for Nebulization 500 MICROGram(s) Nebulizer every 6 hours  pantoprazole   Suspension 40 milliGRAM(s) Enteral Tube daily  scopolamine 1 mG/72 Hr(s) Patch 1 Patch Transdermal every 72 hours  sodium chloride 0.9%. 1000 milliLiter(s) IV Continuous <Continuous>  warfarin 5 milliGRAM(s) Oral once      Objective:  Vital Signs Last 24 Hrs  T(C): 36.4 (06 Jul 2021 16:00), Max: 36.4 (06 Jul 2021 08:00)  T(F): 97.6 (06 Jul 2021 16:00), Max: 97.6 (06 Jul 2021 08:00)  HR: 86 (06 Jul 2021 18:16) (76 - 129)  BP: --  BP(mean): --  RR: 21 (06 Jul 2021 18:00) (12 - 43)  SpO2: 100% (06 Jul 2021 18:16) (97% - 100%)    PHYSICAL EXAM:  Constitutional:no acute distress  Eyes:ALNOSO, EOMI  Ear/Nose/Throat: no oral lesions, trach	  Respiratory: audible bilat  Cardiovascular: S1S2 LVAD sounds  Gastrointestinal: distended  Extremities:no e/e/c  No Lymphadenopathy  IV sites not inflammed.    LABS:                        13.0   6.83  )-----------( 214      ( 05 Jul 2021 23:35 )             43.0     07-05    134<L>  |  97  |  12  ----------------------------<  135<H>  4.0   |  26  |  0.64    Ca    9.6      05 Jul 2021 23:35  Phos  3.3     07-05  Mg     2.0     07-05    TPro  8.3  /  Alb  4.0  /  TBili  0.3  /  DBili  x   /  AST  22  /  ALT  9<L>  /  AlkPhos  111  07-05    PT/INR - ( 06 Jul 2021 14:04 )   PT: 13.7 sec;   INR: 1.15 ratio         PTT - ( 06 Jul 2021 14:04 )  PTT:41.2 sec      MICROBIOLOGY:            RECENT CULTURES:  06-30 @ 20:57  .Sputum Sputum  --  --  --    Normal Respiratory Bria present  --      RADIOLOGY & ADDITIONAL STUDIES:  < from: Xray Abdomen 1 View PORTABLE -Urgent (Xray Abdomen 1 View PORTABLE -Urgent .) (07.05.21 @ 23:40) >  IMPRESSION:  Air-filled loops of bowel without evidence of obstruction given limitations of study.    < end of copied text >

## 2021-07-06 NOTE — PROGRESS NOTE ADULT - ASSESSMENT
67 YO M with a history of stage D NICM s/p HM2 on 9/2017 as DT (due to severe PAD) with TV ring, prior COVID-19 infection 4/2020, recurrent syncope post LVAD s/p ILR, and chronic abdominal pain with prior negative workup who was admitted with symptomatic anemia with Hgb 4.5 in setting of INR 8.8 without hemodynamic instability. He was transfused and underwent VCE which showed active bleeding in the mid small bowel but subsequent enteroscopy 6/15 did not reveal any active bleeding. He acutely decompensated after procedure with fever/hypertension, low flow alarms, and pulmonary infiltrate with hypoxia requiring intubation from probable aspiration PNA. His LDH is normal and there are no signs of overt LVAD dysfunction on echo though signs of insufficiently unloaded ventricle for which his speed has been increased. Course notable for inability to wean ventilator with persistent secretions for which he underwent tracheostomy as well as acute c diff colitis.     Primary goals at this time is to progress towards tracheostomy decannulation and continue physical therapy for severe deconditioning.

## 2021-07-06 NOTE — PROGRESS NOTE ADULT - PROBLEM SELECTOR PLAN 1
-Resume metoprolol succinate 12.5 mg daily (at 25 mg at home)  -Will eventually resume lisinopril   -Continue Amiodarone 200mg PO daily.

## 2021-07-06 NOTE — PROGRESS NOTE ADULT - SUBJECTIVE AND OBJECTIVE BOX
RICKY HAMPTON  MRN-[id number]  Patient is a 66y old  Male who presents with a chief complaint of Anemia, Supratherapeutic INR, Dark Stools (2021 17:12)    HPI:  65M PMH ACC/AHA stage D HF due to NICM HM2 LVAD , TV annuloplasty ring 17 as destination therapy due to severe peripheral artery disease with significant stenosis  SIADH, Depression, CKD-3 with hyperkalemia, past E. coli UTIs, driveline drainage (21) and COVID-19 (back in 2020)  He was recently seen in clinic where he complained of abdominal pain and dark stools w constipation back in May. He presents to [facility] ER today weakness and fatigue, moderate and + Black stools for three days, on coumadin secondary to warfarin use in the setting of an LVAD. Patient has required transfusions for GIB in the past. Mostly recently back in 2021 pt had anemia with dark stools. No interventions was done at that time. However Last Endoscopy was done in 2020 (negative). Today labs show patient is anemic with H/H of 4.5/16.3,. INR is 8.84 MAP in the 90s, Temp 35.1. He denies any chest pain, shortness of breath, dizziness, abd pain, nausea or vomiting.       (2021 16:57)      Surgery/Hospital Course:   admit for melena w/ anemia, INR 8.84   6/15 Capsul study (+) for small bowel bleed, balloon endoscopy (old blood in prox ileum); post EGD - septic w/ L opacity, re-intubated for concern for aspiration, TTE (Mod MR, decrease biV w/ interventricular septum boweing towards R)   bronch    TC    CT C/A/P: Fluid filled colon which may be 2/2 rapid transit. Small bilateral pleffs with associates. Compressive atelectasis New ISABELLE & LLL  parenchymal opacities, suspicious for pneumonia. 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    #8 Shiley trach at bedside    CPAP trials    LVAD speed increased to 9200   Bronch; Central line dc'ed;     Today:    ICU Vital Signs Last 24 Hrs  T(C): 36.4 (2021 08:00), Max: 36.8 (2021 16:00)  T(F): 97.6 (2021 08:00), Max: 98.3 (2021 16:00)  HR: 121 (2021 08:58) (56 - 121)  BP: --  BP(mean): --  ABP: 124/76 (2021 08:00) (78/60 - 128/121)  ABP(mean): 96 (2021 08:00) (66 - 128)  RR: 34 (2021 08:58) (12 - 87)  SpO2: 100% (2021 08:58) (97% - 100%)      Physical Exam:  Gen:  Awake, alert   CNS: non focal 	  Neck: no JVD  RES : clear , no wheezing              CVS: Regular  rhythm. Normal S1/S2  Abd: Soft, non-distended. Bowel sounds present.  Skin: No rash.  Ext:  no edema    ============================I/O===========================   I&O's Detail    2021 07:01  -  2021 07:00  --------------------------------------------------------  IN:    Dexmedetomidine: 41.3 mL    Heparin: 273 mL    IV PiggyBack: 50 mL    Miscellaneous Tube Feedin mL  Total IN: 1804.3 mL    OUT:    Voided (mL): 1650 mL  Total OUT: 1650 mL    Total NET: 154.3 mL      2021 07:01  -  2021 09:01  --------------------------------------------------------  IN:    Heparin: 10 mL    Miscellaneous Tube Feedin mL  Total IN: 70 mL    OUT:    Dexmedetomidine: 0 mL    Voided (mL): 200 mL  Total OUT: 200 mL    Total NET: -130 mL        ============================ LABS =========================                        13.0   6.83  )-----------( 214      ( 2021 23:35 )             43.0     07-05    134<L>  |  97  |  12  ----------------------------<  135<H>  4.0   |  26  |  0.64    Ca    9.6      2021 23:35  Phos  3.3     07-05  Mg     2.0     07-05    TPro  8.3  /  Alb  4.0  /  TBili  0.3  /  DBili  x   /  AST  22  /  ALT  9<L>  /  AlkPhos  111  07-05    LIVER FUNCTIONS - ( 2021 23:35 )  Alb: 4.0 g/dL / Pro: 8.3 g/dL / ALK PHOS: 111 U/L / ALT: 9 U/L / AST: 22 U/L / GGT: x           PT/INR - ( 2021 14:26 )   PT: 14.1 sec;   INR: 1.18 ratio         PTT - ( 2021 05:26 )  PTT:57.3 sec  ABG - ( 2021 23:30 )  pH, Arterial: 7.42  pH, Blood: x     /  pCO2: 50    /  pO2: 102   / HCO3: 32    / Base Excess: 6.5   /  SaO2: 98                  ======================Micro/Rad/Cardio=================  Culture: Reviewed   CXR: Reviewed  Echo: Reviewed  ======================================================  PAST MEDICAL & SURGICAL HISTORY:  CHF (congestive heart failure)    CAD (coronary artery disease)    Depression    Pleural effusion    History of 2019 novel coronavirus disease (COVID-19)  2020    Hemorrhoids    Bleeding hemorrhoids    Peripheral arterial disease    Claudication    BPH with urinary obstruction    ACC/AHA stage D systolic heart failure    Anticoagulation goal of INR 2.0 to 2.5    Falls    Clavicle fracture    CKD (chronic kidney disease), stage III    Iron deficiency anemia    H/O epistaxis    Vertigo    GI bleed    S/P TVR (tricuspid valve repair)    S/P ventricular assist device    S/P endoscopy      ====================ASSESSMENT ==============  Stage D Nonischemic Cardiomyopathy, Status Post HM2 on 2017   Recent driveline infection on 2021   Cardiogenic shock  Hemodynamic instability   Acute hypoxemic respiratory failure  GI bleed , Status Post Enteroscopy   Anemia, in setting of melena   Chronic Kidney Disease  Stress hyperglycemia   C.diff positive on       Plan:  ====================== NEUROLOGY=====================  Sedated with IV Precedex  drip for ventilator synchrony.   Assess neuro status per protocol when pt is off sedation.   Continue Tylenol PRN for analgesia  Klonopin for agitation     acetaminophen    Suspension .. 650 milliGRAM(s) Oral every 6 hours PRN Temp greater or equal to 38C (100.4F)  clonazePAM  Tablet 0.5 milliGRAM(s) Oral every 8 hours  dexMEDEtomidine Infusion 0.4 MICROgram(s)/kG/Hr (7.85 mL/Hr) IV Continuous <Continuous>  ==================== RESPIRATORY======================   Patient had a moderate amount of thin secretions which required intermittent suctioning. Secretions were increased with associated coughing after several hours on trach collar.   Respiratory status required full ventilatory support with intermittent weaning to pressure support and trach collar, close monitoring of respiratory rate and breathing pattern, the following of ABG’s with A-line monitoring, continuous pulse oximetry monitoring.   7/2 Pt desatted after being on trach collar from 6am-11:30pm.   Continue use of bronchodilators.     Mechanical Ventilation:  Mode: standby,40% trach     ipratropium    for Nebulization 500 MICROGram(s) Nebulizer every 6 hours  ====================CARDIOVASCULAR==================  Stage D Nonischemic Cardiomyopathy, Status Post HM2 on 2017   Cardiogenic shock  Hemodynamic instability   Hypovolemic shock  HM2 settings: was initially 8000 but increased to 9000 rpm due to MR and to help with volume overload after transfusion and volume resuscitation.    LVAD speed increased to 9200 and flow 4.6  Echo 6/15: severe global Left ventricular systolic dysfunction with HM2 in place, decreased Right ventricular systolic function, interventricular septums bows slightly to right, otherwise grossly similar to prior.   Rate and rhythm control with Amiodarone.   Invasive hemodynamic monitoring, assess perfusion indices.   Pressor support with Hydralazine.     aMIOdarone    Tablet 200 milliGRAM(s) Oral daily  hydrALAZINE 10 milliGRAM(s) Oral every 8 hours PRN MAP greater than 85  ===================HEMATOLOGIC/ONC ===================  Anemia due to acute blood loss associated with small bowel bleed.   Monitor H&H/Plts  IV heparin and Warfarin for LVAD, and venous thromboembolism prophylaxis  in addition to sequential compression devices    heparin  Infusion 400 Unit(s)/Hr (10 mL/Hr) IV Continuous <Continuous>  ===================== RENAL =========================  Continue to monitor I/Os, BUN/Creatinine, and urine output.   Goal net negative fluid balance. Replete lytes PRN. Keep K> 4 and Mg >2.     ==================== GASTROINTESTINAL===================  Tolerating Vital AF, @ goal 60cc/hr.   Continue Protonix for stress ulcer prophylaxis.   Continue with Scopolamine for nausea.    pantoprazole   Suspension 40 milliGRAM(s) Enteral Tube daily  sodium chloride 0.9%. 1000 milliLiter(s) (10 mL/Hr) IV Continuous <Continuous>  scopolamine 1 mG/72 Hr(s) Patch 1 Patch Transdermal every 72 hours  =======================    ENDOCRINE  =====================  Hx of DMT2, glycemic control with Admelog sliding scale and Glucagon PRN.   Monitor blood glucose levels.     insulin lispro (ADMELOG) corrective regimen sliding scale   SubCutaneous every 6 hours  ========================INFECTIOUS DISEASE================  Temperature 98.3F, Leukocytosis downtrending w/ WBC 11.54-> 10.90 ->6.83  Monitor temperature and trend WBC  : C.diff positive, will continue Vancomycin for C.diff   Continue cefazolin for bacteremia     ceFAZolin   IVPB 2000 milliGRAM(s) IV Intermittent every 8 hours  vancomycin    Solution 500 milliGRAM(s) Oral every 6 hours      I have spent 35 minutes providing acute care for this critically ill patient     Patient requires continuous monitoring with bedside rhythm monitoring, pulse ox monitoring, and intermittent blood gas analysis. Care plan discussed with ICU care team. Patient remained critical and at risk for life threatening decompensation.     By signing my name below, I, Brian Robin, attest that this documentation has been prepared under the direction and in the presence of Kota Thomas MD   Electronically signed: Cesia Gonzalez, 21 @ 09:02    I, Kota Thomas, personally performed the services described in this documentation. all medical record entries made by the luisibmel were at my direction and in my presence. I have reviewed the chart and agree that the record reflects my personal performance and is accurate and complete  Electronically signed: Kota Thomas MD        RICKY HAMPTON  MRN-[id number]  Patient is a 66y old  Male who presents with a chief complaint of Anemia, Supratherapeutic INR, Dark Stools (2021 17:12)    HPI:  65M PMH ACC/AHA stage D HF due to NICM HM2 LVAD , TV annuloplasty ring 17 as destination therapy due to severe peripheral artery disease with significant stenosis  SIADH, Depression, CKD-3 with hyperkalemia, past E. coli UTIs, driveline drainage (21) and COVID-19 (back in 2020)  He was recently seen in clinic where he complained of abdominal pain and dark stools w constipation back in May. He presents to [facility] ER today weakness and fatigue, moderate and + Black stools for three days, on coumadin secondary to warfarin use in the setting of an LVAD. Patient has required transfusions for GIB in the past. Mostly recently back in 2021 pt had anemia with dark stools. No interventions was done at that time. However Last Endoscopy was done in 2020 (negative). Today labs show patient is anemic with H/H of 4.5/16.3,. INR is 8.84 MAP in the 90s, Temp 35.1. He denies any chest pain, shortness of breath, dizziness, abd pain, nausea or vomiting.       (2021 16:57)      Surgery/Hospital Course:   admit for melena w/ anemia, INR 8.84   6/15 Capsul study (+) for small bowel bleed, balloon endoscopy (old blood in prox ileum); post EGD - septic w/ L opacity, re-intubated for concern for aspiration, TTE (Mod MR, decrease biV w/ interventricular septum boweing towards R)   bronch    TC    CT C/A/P: Fluid filled colon which may be 2/2 rapid transit. Small bilateral pleffs with associates. Compressive atelectasis New ISABELLE & LLL  parenchymal opacities, suspicious for pneumonia. 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    #8 Shiley trach at bedside    CPAP trials    LVAD speed increased to 9200   Bronch; Central line dc'ed;     Today:  Patient s/p tracheostomy will plan for 16hrs of TC today and rest 8hrs on the vent, continue TC as tolerated. Plan for afternoon INR and dose of coumadin tonight, INR now 1.18. Tube feeds remain at goal and heavy secretions being treated with Scopolamine.      ICU Vital Signs Last 24 Hrs  T(C): 36.4 (2021 08:00), Max: 36.8 (2021 16:00)  T(F): 97.6 (2021 08:00), Max: 98.3 (2021 16:00)  HR: 121 (2021 08:58) (56 - 121)  BP: --  BP(mean): --  ABP: 124/76 (2021 08:00) (78/60 - 128/121)  ABP(mean): 96 (2021 08:00) (66 - 128)  RR: 34 (2021 08:58) (12 - 87)  SpO2: 100% (2021 08:58) (97% - 100%)      Physical Exam:  Gen:  Awake, alert   CNS: non focal 	  Neck: no JVD  RES : clear , no wheezing              CVS: Regular  rhythm. Normal S1/S2  Abd: Soft, non-distended. Bowel sounds present.  Skin: No rash.  Ext:  no edema    ============================I/O===========================   I&O's Detail    2021 07:01  -  2021 07:00  --------------------------------------------------------  IN:    Dexmedetomidine: 41.3 mL    Heparin: 273 mL    IV PiggyBack: 50 mL    Miscellaneous Tube Feedin mL  Total IN: 1804.3 mL    OUT:    Voided (mL): 1650 mL  Total OUT: 1650 mL    Total NET: 154.3 mL      2021 07:01  -  2021 09:01  --------------------------------------------------------  IN:    Heparin: 10 mL    Miscellaneous Tube Feedin mL  Total IN: 70 mL    OUT:    Dexmedetomidine: 0 mL    Voided (mL): 200 mL  Total OUT: 200 mL    Total NET: -130 mL        ============================ LABS =========================                        13.0   6.83  )-----------( 214      ( 2021 23:35 )             43.0     07-05    134<L>  |  97  |  12  ----------------------------<  135<H>  4.0   |  26  |  0.64    Ca    9.6      2021 23:35  Phos  3.3     07-05  Mg     2.0     07-05    TPro  8.3  /  Alb  4.0  /  TBili  0.3  /  DBili  x   /  AST  22  /  ALT  9<L>  /  AlkPhos  111  07-05    LIVER FUNCTIONS - ( 2021 23:35 )  Alb: 4.0 g/dL / Pro: 8.3 g/dL / ALK PHOS: 111 U/L / ALT: 9 U/L / AST: 22 U/L / GGT: x           PT/INR - ( 2021 14:26 )   PT: 14.1 sec;   INR: 1.18 ratio         PTT - ( 2021 05:26 )  PTT:57.3 sec  ABG - ( 2021 23:30 )  pH, Arterial: 7.42  pH, Blood: x     /  pCO2: 50    /  pO2: 102   / HCO3: 32    / Base Excess: 6.5   /  SaO2: 98                  ======================Micro/Rad/Cardio=================  Culture: Reviewed   CXR: Reviewed  Echo: Reviewed  ======================================================  PAST MEDICAL & SURGICAL HISTORY:  CHF (congestive heart failure)    CAD (coronary artery disease)    Depression    Pleural effusion    History of 2019 novel coronavirus disease (COVID-19)  2020    Hemorrhoids    Bleeding hemorrhoids    Peripheral arterial disease    Claudication    BPH with urinary obstruction    ACC/AHA stage D systolic heart failure    Anticoagulation goal of INR 2.0 to 2.5    Falls    Clavicle fracture    CKD (chronic kidney disease), stage III    Iron deficiency anemia    H/O epistaxis    Vertigo    GI bleed    S/P TVR (tricuspid valve repair)    S/P ventricular assist device    S/P endoscopy      ====================ASSESSMENT ==============  Stage D Nonischemic Cardiomyopathy, Status Post HM2 on 2017   Recent driveline infection on 2021   Cardiogenic shock  Hemodynamic instability   Acute hypoxemic respiratory failure  GI bleed , Status Post Enteroscopy   Anemia, in setting of melena   Chronic Kidney Disease  Stress hyperglycemia   C.diff positive on       Plan:  ====================== NEUROLOGY=====================  Sedated with IV Precedex  drip for ventilator synchrony.   Assess neuro status per protocol when pt is off sedation.   Continue Tylenol PRN for analgesia  Klonopin for agitation     acetaminophen    Suspension .. 650 milliGRAM(s) Oral every 6 hours PRN Temp greater or equal to 38C (100.4F)  clonazePAM  Tablet 0.5 milliGRAM(s) Oral every 8 hours  dexMEDEtomidine Infusion 0.4 MICROgram(s)/kG/Hr (7.85 mL/Hr) IV Continuous <Continuous>  ==================== RESPIRATORY======================  7/ Patient had a moderate amount of thin secretions which required intermittent suctioning. Secretions were increased with associated coughing after several hours on trach collar.   Respiratory status required full ventilatory support with intermittent weaning to pressure support and trach collar, close monitoring of respiratory rate and breathing pattern, the following of ABG’s with A-line monitoring, continuous pulse oximetry monitoring.    Pt desatted after being on trach collar from 6am-11:30pm.   Continue use of bronchodilators.   TC for 16hrs today and rest on the camille for 8hrs, continue TC as tolerated.   Scopolamine for heavy secretions.     Mechanical Ventilation:  Mode: standby,40% trach     ipratropium    for Nebulization 500 MICROGram(s) Nebulizer every 6 hours  scopolamine 1 mG/72 Hr(s) Patch 1 Patch Transdermal every 72 hours  ====================CARDIOVASCULAR==================  Stage D Nonischemic Cardiomyopathy, Status Post HM2 on 2017   Cardiogenic shock  Hemodynamic instability   Hypovolemic shock  HM2 settings: was initially 8000 but increased to 9000 rpm due to MR and to help with volume overload after transfusion and volume resuscitation.    LVAD speed increased to 9200 and flow 4.6  Echo 6/15: severe global Left ventricular systolic dysfunction with HM2 in place, decreased Right ventricular systolic function, interventricular septums bows slightly to right, otherwise grossly similar to prior.   Rate and rhythm control with Amiodarone.   Invasive hemodynamic monitoring, assess perfusion indices.   Pressor support with Hydralazine.     aMIOdarone    Tablet 200 milliGRAM(s) Oral daily  hydrALAZINE 10 milliGRAM(s) Oral every 8 hours PRN MAP greater than 85  ===================HEMATOLOGIC/ONC ===================  Anemia due to acute blood loss associated with small bowel bleed.   Monitor H&H/Plts  IV heparin and Warfarin for LVAD, and venous thromboembolism prophylaxis  in addition to sequential compression devices   Plan for afternoon INR and dose of coumadin tonight, INR now 1.18    heparin  Infusion 400 Unit(s)/Hr (10 mL/Hr) IV Continuous <Continuous>  ===================== RENAL =========================  Continue to monitor I/Os, BUN/Creatinine, and urine output.   Goal net negative fluid balance. Replete lytes PRN. Keep K> 4 and Mg >2.     ==================== GASTROINTESTINAL===================  Tolerating Vital AF, @ goal 60cc/hr.   Continue Protonix for stress ulcer prophylaxis.     pantoprazole   Suspension 40 milliGRAM(s) Enteral Tube daily  sodium chloride 0.9%. 1000 milliLiter(s) (10 mL/Hr) IV Continuous <Continuous>  =======================    ENDOCRINE  =====================  Hx of DMT2, glycemic control with Admelog sliding scale and Glucagon PRN.   Monitor blood glucose levels.     insulin lispro (ADMELOG) corrective regimen sliding scale   SubCutaneous every 6 hours  ========================INFECTIOUS DISEASE================  Temperature 98.3F, Leukocytosis downtrending w/ WBC 11.54-> 10.90 ->6.83  Monitor temperature and trend WBC  : C.diff positive, will continue Vancomycin for C.diff   Continue cefazolin for bacteremia     ceFAZolin   IVPB 2000 milliGRAM(s) IV Intermittent every 8 hours  vancomycin    Solution 500 milliGRAM(s) Oral every 6 hours      I have spent 35 minutes providing acute care for this critically ill patient     Patient requires continuous monitoring with bedside rhythm monitoring, pulse ox monitoring, and intermittent blood gas analysis. Care plan discussed with ICU care team. Patient remained critical and at risk for life threatening decompensation.     By signing my name below, I, Brian Robin, attest that this documentation has been prepared under the direction and in the presence of Kota Thomas MD   Electronically signed: Romel Gonzalez, 21 @ 09:02    I, Kota Thomas, personally performed the services described in this documentation. all medical record entries made by the romel were at my direction and in my presence. I have reviewed the chart and agree that the record reflects my personal performance and is accurate and complete  Electronically signed: Kota Thomas MD

## 2021-07-06 NOTE — PROGRESS NOTE ADULT - PROBLEM SELECTOR PLAN 3
-s/p trach 6/25; wean ventilator as tolerates.  -Sedation vacation and wean as per CTU.  -Completed empiric treatment for VAP

## 2021-07-06 NOTE — PROGRESS NOTE ADULT - SUBJECTIVE AND OBJECTIVE BOX
RICKY JOINT  MRN#: [id number]  Subjective:  Pulmonary progress  : recurrent Acute hypoxic respiratory Failure ,aspiration pneumonia, NICM  , chart reviewed and H/O obtained radiological and Laboratory study reviewed patient Examined     65M PMH ACC/AHA stage D HF due to NICM HM2 LVAD , TV annuloplasty ring 17 as destination therapy due to severe peripheral artery disease with significant stenosis  SIADH, Depression, CKD-3 with hyperkalemia, past E. coli UTIs, driveline drainage (21) and COVID-19 (back in 2020)  He was recently seen in clinic where he complained of abdominal pain and dark stools w constipation back in May. He presents to [facility] ER today weakness and fatigue, moderate and + Black stools for three days, on coumadin secondary to warfarin use in the setting of an LVAD. Patient has required transfusions for GIB in the past. Mostly recently back in 2021 pt had anemia with dark stools. No interventions was done at that time. However Last Endoscopy was done in 2020 (negative). Today labs show patient is anemic with H/H of 4.5/16.3,. INR is 8.84 MAP in the 90s, Temp 35.1. He denies any chest pain, shortness of breath, dizziness, abd pain, nausea or vomiting. found to have  rectal bleeding underwent endoscopy ,old blood in the proximal ileum ,  develop sepsis with LL opacity given Antibiotics , Extubated , reintubated , Bronchoscopy on Zosyn for LL pneumonia  and Amiodarone S/P TV Annuloplasty , patient remain intubated on full ventilatory support .S/P multiple units of blood transfusion , remain on full ventilatory support on Precedex and propofol , new central IJ line , diarrhea C diff. +ve on po vancomycin and IV Flagyl,  mildly distended belly , fever start on cefepime 2gm q 8 hrs S/P tracheostomy .  new RT Subclavian central line continue on contact  isolation ,still diarrhea on C-diff antibiotics ENT follow up appreciated , trial of C-PAP as tolerated , , copious secretion from trach. site chest x ray left lower lobe pneumonia , tolerating trch. collar 50% FI02 still excessive secretion need pulmonary toilet , on Ancef antibiotic , no more diarrhea back on full support mechanical ventilator , chest x ray show improvement in LLL air space disease, more awake and responsive on tube feeding no more diarrhea , afebrile on Ancef for bacteremia          (2021 16:57)    PAST MEDICAL & SURGICAL HISTORY:  CHF (congestive heart failure)    CAD (coronary artery disease)  Depression    Pleural effusion    History of 2019 novel coronavirus disease (COVID-19)  2020    Hemorrhoids    Bleeding hemorrhoids    Peripheral arterial disease    Claudication    BPH with urinary obstruction    ACC/AHA stage D systolic heart failure  Anticoagulation goal of INR 2.0 to 2.5    Falls    Clavicle fracture    CKD (chronic kidney disease), stage III    Iron deficiency anemia    H/O epistaxis    Vertigo    GI bleed    S/P TVR (tricuspid valve repair)    S/P ventricular assist device    S/P endoscopy    OBJECTIVE:  ICU Vital Signs Last 24 Hrs  T(C): 38.2 (2021 10:00), Max: 38.5 (2021 12:00)  T(F): 100.8 (2021 10:00), Max: 101.3 (2021 12:00)  HR: 65 (2021 10:00) (61 - 69)  BP: --  BP(mean): --  ABP: 105/67 (2021 10:00) (90/54 - 113/64)  ABP(mean): 77 (2021 10:00) (63 - 77)  RR: 20 (2021 10:00) (19 - 35)  SpO2: 99% (2021 10:00) (96% - 100%)       @ : @ 07:00  --------------------------------------------------------  IN: 2693.9 mL / OUT: 1415 mL / NET: 1278.9 mL     @ : @ 10:49  --------------------------------------------------------  IN: 420.8 mL / OUT: 115 mL / NET: 305.8 mL  PHYSICAL EXAM:Daily   Elderly male S/P tracheostomy  sedated  on full ventilatory support /40%20/5cm PEEP  0n vasopressin , alternating with trach. collar on IV heparin   Daily Weight in k.4 (2021 00:00)  HEENT:     + NCAT  + EOMI  - Conjuctival edema   - Icterus   - Thrush   - ETT  + NGT/OGT  Neck:         + FROM   RT SC line  JVD     - Nodes     - Masses    + Mid-line trachea + Tracheostomy  Chest: normal findings  Lungs:          + CTA   + Rhonchi    - Rales    - Wheezing + Decreased  LT BS   - Dullness R L  Cardiac:       + S1 + S2    + RRR   - Irregular   - S3  - S4    - Murmurs   - Rub   - Hamman’s sign   Abdomen:    + BS     + Soft    + Non-tender     - Distended    - Organomegaly  - PEG  Extremities:   - Cyanosis U/L   - Clubbing  U/L  + LE/UE Edema   + Capillary refill    + Pulses   Neuro:        - Awake   -  Alert   - Confused   - Lethargic   + Sedated  + Generalized Weakness  Skin:        - Rashes    - Erythema   + Normal incisions   + IV sites intact          HOSPITAL MEDICATIONS: All mediciations reviewed and analyzed  MEDICATIONS  (STANDING):  aMIOdarone    Tablet 200 milliGRAM(s) Oral daily  chlorhexidine 0.12% Liquid 15 milliLiter(s) Oral Mucosa every 12 hours  chlorhexidine 2% Cloths 1 Application(s) Topical <User Schedule>  dexMEDEtomidine Infusion 0.5 MICROgram(s)/kG/Hr (9.81 mL/Hr) IV Continuous <Continuous>  dextrose 50% Injectable 50 milliLiter(s) IV Push every 15 minutes  heparin  Infusion 400 Unit(s)/Hr (12.5 mL/Hr) IV Continuous <Continuous>  HYDROmorphone  Injectable 0.5 milliGRAM(s) IV Push once  insulin lispro (ADMELOG) corrective regimen sliding scale   SubCutaneous every 6 hours  pantoprazole  Injectable 40 milliGRAM(s) IV Push every 12 hours  piperacillin/tazobactam IVPB.. 3.375 Gram(s) IV Intermittent every 8 hours  propofol Infusion 20 MICROgram(s)/kG/Min (9.42 mL/Hr) IV Continuous <Continuous>  sodium chloride 0.9% lock flush 3 milliLiter(s) IV Push every 8 hours  sodium chloride 0.9%. 1000 milliLiter(s) (10 mL/Hr) IV Continuous <Continuous>    MEDICATIONS  (PRN):  acetaminophen    Suspension .. 650 milliGRAM(s) Oral every 6 hours PRN Temp greater or equal to 38C (100.4F)    LABS: All Lab data reviewed and analyzed                          13.0   6.83  )-----------( 214      ( 2021 23:35 )             43.0    07-05    134<L>  |  97  |  12  ----------------------------<  135<H>  4.0   |  26  |  0.64    Ca    9.6      2021 23:35  Phos  3.3     07-05  Mg     2.0     07-05    TPro  8.3  /  Alb  4.0  /  TBili  0.3  /  DBili  x   /  AST  22  /  ALT  9<L>  /  AlkPhos  111  07-05    Ca    9.5      2021 00:39  Phos  3.0     07-05  Mg     2.1     07-05    TPro  8.1  /  Alb  3.5  /  TBili  0.3  /  DBili  x   /  AST  17  /  ALT  9<L>  /  AlkPhos  125<H>  07-05    Ca    9.4      2021 00:28  Phos  3.1     07-04  Mg     1.8     07-04    TPro  8.1  /  Alb  3.8  /  TBili  0.3  /  DBili  x   /  AST  18  /  ALT  11  /  AlkPhos  127<H>  07-04                         PTT - ( 2021 04:52 )  PTT:45.2 sec LIVER FUNCTIONS - ( 2021 00:42 )  Alb: 3.4 g/dL / Pro: 6.7 g/dL / ALK PHOS: 213 U/L / ALT: 15 U/L / AST: 24 U/L / GGT: x           RADIOLOGY: - Reviewed and analyzed LLL  pneumonia , LVAD HM2, CT scan of abdomen reviewed result noted

## 2021-07-06 NOTE — PROGRESS NOTE ADULT - ASSESSMENT
imp/rx:  Suspected aspiration event at time of Endoscopic procedure with subsequent development of C.diff diarrhea  Now with increasing leukocytosis and gram + cocci in clusters from blood culture sent 6/26 with organism ID/sensitivity pending  Started IV Vancomycin  Vanco CLAU=4 is borderline  Will change antibiotics to Cefazolin  Would change CVC lines as feasible   repeat blood cultures 6/29 x2 sets are NGTD  Patient's diarrhea improved with holding his feeds- he is also c/o nausea      Clostridiodes difficile :  Abdomen somewhat distended- but soft- loose stool noted this afternoon  Continue Vanco oral 500mg qid  Will discontinue the flagyl as it may be contributing to the nausea and diarrhea improving    Staph lugdenesis- c;eared quickly will treat for a short course with cefazolin x 7days      Morris Prater MD  [phone]  After 5pm/weekends [phone]         imp/rx:  Suspected aspiration event at time of Endoscopic procedure with subsequent development of C.diff diarrhea  Now with increasing leukocytosis and gram + cocci in clusters from blood culture sent 6/26 with organism ID/sensitivity pending  Started IV Vancomycin  Vanco CLAU=4 is borderline  Will change antibiotics to Cefazolin  Would change CVC lines as feasible   repeat blood cultures 6/29 x2 sets are NGTD  Patient's diarrhea improved with holding his feeds- he is also c/o nausea      Clostridiodes difficile :  Abdomen somewhat distended- but soft- loose stool noted this afternoon  Continue Vanco oral 500mg qid  But diarrhea improved when feeds were  held making it likely that the enteral feeds are contributing cornelio the ongoing diarrhea      Morris Prater MD  [phone]  After 5pm/weekends [phone]

## 2021-07-07 NOTE — PROGRESS NOTE ADULT - ASSESSMENT
imp/rx:  Suspected aspiration event at time of Endoscopic procedure with subsequent development of C.diff diarrhea  Now with increasing leukocytosis and gram + cocci in clusters from blood culture sent 6/26 with organism ID/sensitivity pending  Started IV Vancomycin  Vanco CLAU=4 is borderline  Will change antibiotics to Cefazolin  Would change CVC lines as feasible   repeat blood cultures 6/29 x2 sets are NGTD  Patient's diarrhea improved with holding his feeds- he is also c/o nausea      Clostridiodes difficile :  Abdomen somewhat distended- but soft- loose stool noted this afternoon  Continue Vanco oral 500mg qid  But diarrhea improved when feeds were  held making it likely that the enteral feeds are contributing cornelio the ongoing diarrhea      Morris Prater MD  [phone]  After 5pm/weekends [phone]

## 2021-07-07 NOTE — PROGRESS NOTE ADULT - SUBJECTIVE AND OBJECTIVE BOX
RICKY HAMPTON  MRN-[id number]  Patient is a 66y old  Male who presents with a chief complaint of Anemia, Supratherapeutic INR, Dark Stools (2021 18:26)    HPI:  65M PMH ACC/AHA stage D HF due to NICM HM2 LVAD , TV annuloplasty ring 17 as destination therapy due to severe peripheral artery disease with significant stenosis  SIADH, Depression, CKD-3 with hyperkalemia, past E. coli UTIs, driveline drainage (21) and COVID-19 (back in 2020)  He was recently seen in clinic where he complained of abdominal pain and dark stools w constipation back in May. He presents to [facility] ER today weakness and fatigue, moderate and + Black stools for three days, on coumadin secondary to warfarin use in the setting of an LVAD. Patient has required transfusions for GIB in the past. Mostly recently back in 2021 pt had anemia with dark stools. No interventions was done at that time. However Last Endoscopy was done in 2020 (negative). Today labs show patient is anemic with H/H of 4.5/16.3,. INR is 8.84 MAP in the 90s, Temp 35.1. He denies any chest pain, shortness of breath, dizziness, abd pain, nausea or vomiting.       (2021 16:57)      Surgery/Hospital Course:   admit for melena w/ anemia, INR 8.84   6/15 Capsul study (+) for small bowel bleed, balloon endoscopy (old blood in prox ileum); post EGD - septic w/ L opacity, re-intubated for concern for aspiration, TTE (Mod MR, decrease biV w/ interventricular septum boweing towards R)   bronch    TC    CT C/A/P: Fluid filled colon which may be 2/2 rapid transit. Small bilateral pleffs with associates. Compressive atelectasis New ISABELLE & LLL  parenchymal opacities, suspicious for pneumonia. 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    #8 Shiley trach at bedside    CPAP trials    LVAD speed increased to 9200   Bronch; Central line dc'ed;     Today:  No acute events     ICU Vital Signs Last 24 Hrs  T(C): 36.1 (2021 04:00), Max: 36.7 (2021 00:00)  T(F): 97 (2021 04:00), Max: 98 (2021 00:00)  HR: 86 (2021 06:43) (59 - 129)  BP: --  BP(mean): --  ABP: 96/65 (2021 06:00) (74/59 - 124/76)  ABP(mean): 76 (2021 06:00) (65 - 109)  RR: 40 (2021 06:00) (12 - 40)  SpO2: 100% (2021 06:43) (98% - 100%)      Physical Exam:  Gen:  Intubated   CNS: Sedated  Neck: no JVD  RES : clear , no wheezing              CVS: Regular  rhythm. Normal S1/S2  Abd: Soft, non-distended. Bowel sounds present.  Skin: No rash.  Ext:  no edema    ============================I/O===========================   I&O's Detail    2021 07:01  -  2021 07:00  --------------------------------------------------------  IN:    Dexmedetomidine: 78.6 mL    Enteral Tube Flush: 240 mL    Heparin: 230 mL    IV PiggyBack: 200 mL    Miscellaneous Tube Feedin mL    sodium chloride 0.9%: 55 mL  Total IN: 2183.6 mL    OUT:    Voided (mL): 1300 mL  Total OUT: 1300 mL    Total NET: 883.6 mL        ============================ LABS =========================                        9.1    8.73  )-----------( 288      ( 2021 00:45 )             30.9     07-07    132<L>  |  95<L>  |  12  ----------------------------<  154<H>  3.8   |  27  |  0.61    Ca    9.6      2021 00:46  Phos  3.2     07-07  Mg     1.9     -    TPro  8.3  /  Alb  3.9  /  TBili  0.3  /  DBili  x   /  AST  18  /  ALT  6<L>  /  AlkPhos  106  -07    LIVER FUNCTIONS - ( 2021 00:46 )  Alb: 3.9 g/dL / Pro: 8.3 g/dL / ALK PHOS: 106 U/L / ALT: 6 U/L / AST: 18 U/L / GGT: x           PT/INR - ( 2021 14:04 )   PT: 13.7 sec;   INR: 1.15 ratio         PTT - ( 2021 20:20 )  PTT:48.7 sec  ABG - ( 2021 00:32 )  pH, Arterial: 7.40  pH, Blood: x     /  pCO2: 50    /  pO2: 144   / HCO3: 30    / Base Excess: 5.0   /  SaO2: 99                  ======================Micro/Rad/Cardio=================  Culture: Reviewed   CXR: Reviewed  Echo:Reviewed  ======================================================  PAST MEDICAL & SURGICAL HISTORY:  CHF (congestive heart failure)    CAD (coronary artery disease)    Depression    Pleural effusion    History of 2019 novel coronavirus disease (COVID-19)  2020    Hemorrhoids    Bleeding hemorrhoids    Peripheral arterial disease    Claudication    BPH with urinary obstruction    ACC/AHA stage D systolic heart failure    Anticoagulation goal of INR 2.0 to 2.5    Falls    Clavicle fracture    CKD (chronic kidney disease), stage III    Iron deficiency anemia    H/O epistaxis    Vertigo    GI bleed    S/P TVR (tricuspid valve repair)    S/P ventricular assist device    S/P endoscopy      ====================ASSESSMENT ==============  Stage D Nonischemic Cardiomyopathy, Status Post HM2 on 2017   Recent driveline infection on 2021   Cardiogenic shock  Hemodynamic instability   Acute hypoxemic respiratory failure  GI bleed , Status Post Enteroscopy   Anemia, in setting of melena   Chronic Kidney Disease  Stress hyperglycemia   C.diff positive on        Plan:  ====================== NEUROLOGY=====================  Sedated with IV Precedex drip   Assess neuro status per protocol when pt is off sedation.   Continue Tylenol PRN for analgesia  Klonopin for agitation    acetaminophen    Suspension .. 650 milliGRAM(s) Oral every 6 hours PRN Temp greater or equal to 38C (100.4F)  clonazePAM  Tablet 0.5 milliGRAM(s) Oral every 8 hours  dexMEDEtomidine Infusion 0.4 MICROgram(s)/kG/Hr (7.85 mL/Hr) IV Continuous <Continuous>    ==================== RESPIRATORY======================  Respiratory status required full ventilatory support with intermittent weaning to pressure support and trach collar, close monitoring of respiratory rate and breathing pattern, the   Patient had a moderate amount of thin secretions which required intermittent suctioning. Secretions were increased with associated coughing after several hours on trach collar.  following of ABG’s with A-line monitoring, continuous pulse oximetry monitoring.    Pt desatted after being on trach collar from 6am-11:30pm.   Continue use of bronchodilators.   Scopolamine for heavy secretions.     ipratropium    for Nebulization 500 MICROGram(s) Nebulizer every 6 hours  scopolamine 1 mG/72 Hr(s) Patch 1 Patch Transdermal every 72 hours    ====================CARDIOVASCULAR==================  Stage D Nonischemic Cardiomyopathy, Status Post HM2 on 2017   HM2 settings: was initially 8000 but increased to 9000 rpm due to MR and to help with volume overload after transfusion and volume resuscitation.    LVAD speed increased to 9200 and flow 4.8  Echo 6/15: severe global Left ventricular systolic dysfunction with HM2 in place, decreased Right ventricular systolic function, interventricular septums bows slightly to right, otherwise grossly similar to prior.   Rate and rhythm control with Amiodarone.   Invasive hemodynamic monitoring, assess perfusion indices.   Pressor support with Hydralazine.     aMIOdarone    Tablet 200 milliGRAM(s) Oral daily  hydrALAZINE 10 milliGRAM(s) Oral every 8 hours PRN MAP greater than 85    ===================HEMATOLOGIC/ONC ===================  Anemia due to acute blood loss associated with small bowel bleed.   Monitor H&H/Plts  IV heparin and Warfarin for LVAD, and venous thromboembolism prophylaxis  in addition to sequential compression devices  Plan for afternoon INR and dose of coumadin tonight,     heparin  Infusion 400 Unit(s)/Hr (10 mL/Hr) IV Continuous <Continuous>    ===================== RENAL =========================  Continue to monitor I/Os, BUN/Creatinine, and urine output.   Goal net negative fluid balance. Replete lytes PRN. Keep K> 4 and Mg >2.     ==================== GASTROINTESTINAL===================  Tolerating Vital AF, @ goal 60cc/hr.   Continue Protonix for stress ulcer prophylaxis.     pantoprazole   Suspension 40 milliGRAM(s) Enteral Tube daily  sodium chloride 0.9%. 1000 milliLiter(s) (10 mL/Hr) IV Continuous <Continuous>    =======================    ENDOCRINE  =====================  Hx of DMT2, glycemic control with Admelog sliding scale  Monitor blood glucose levels.     insulin lispro (ADMELOG) corrective regimen sliding scale   SubCutaneous every 6 hours    ========================INFECTIOUS DISEASE================  Afebrile with WBC within normal range   Monitor temperature and trend WBC  : C.diff positive, will continue Vancomycin for C.diff   Continue cefazolin for bacteremia     ceFAZolin   IVPB 2000 milliGRAM(s) IV Intermittent every 8 hours  vancomycin    Solution 500 milliGRAM(s) Oral every 6 hours          I have spent 35 minutes providing acute care for this critically ill patient     Patient requires continuous monitoring with bedside rhythm monitoring, pulse ox monitoring, and intermittent blood gas analysis. Care plan discussed with ICU care team. Patient remained critical and at risk for life threatening decompensation.     By signing my name below, I, Rojas Juinor, attest that this documentation has been prepared under the direction and in the presence of Kota Thomas MD   Electronically signed: Cesia Cohen, 21 @ 07:21    I, Kota Thomas, personally performed the services described in this documentation. all medical record entries made by the luisibmel were at my direction and in my presence. I have reviewed the chart and agree that the record reflects my personal performance and is accurate and complete  Electronically signed: Kota Thomas MD        RICKY HAMPTON  MRN-[id number]  Patient is a 66y old  Male who presents with a chief complaint of Anemia, Supratherapeutic INR, Dark Stools (2021 18:26)    HPI:  65M PMH ACC/AHA stage D HF due to NICM HM2 LVAD , TV annuloplasty ring 17 as destination therapy due to severe peripheral artery disease with significant stenosis  SIADH, Depression, CKD-3 with hyperkalemia, past E. coli UTIs, driveline drainage (21) and COVID-19 (back in 2020)  He was recently seen in clinic where he complained of abdominal pain and dark stools w constipation back in May. He presents to [facility] ER today weakness and fatigue, moderate and + Black stools for three days, on coumadin secondary to warfarin use in the setting of an LVAD. Patient has required transfusions for GIB in the past. Mostly recently back in 2021 pt had anemia with dark stools. No interventions was done at that time. However Last Endoscopy was done in 2020 (negative). Today labs show patient is anemic with H/H of 4.5/16.3,. INR is 8.84 MAP in the 90s, Temp 35.1. He denies any chest pain, shortness of breath, dizziness, abd pain, nausea or vomiting.       (2021 16:57)      Surgery/Hospital Course:   admit for melena w/ anemia, INR 8.84   6/15 Capsul study (+) for small bowel bleed, balloon endoscopy (old blood in prox ileum); post EGD - septic w/ L opacity, re-intubated for concern for aspiration, TTE (Mod MR, decrease biV w/ interventricular septum boweing towards R)   bronch    TC    CT C/A/P: Fluid filled colon which may be 2/2 rapid transit. Small bilateral pleffs with associates. Compressive atelectasis New ISABELLE & LLL  parenchymal opacities, suspicious for pneumonia. 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    #8 Shiley trach at bedside    CPAP trials    LVAD speed increased to 9200   Bronch; Central line dc'ed;     Today: Patient was on TC for 16 hours yesterday. Currently back on TC and will continue as tolerated. Continue secretions every 4 hours. Will repeat sputum culture today for thick secretions. Ancef has been stopped today.     ICU Vital Signs Last 24 Hrs  T(C): 36.1 (2021 04:00), Max: 36.7 (2021 00:00)  T(F): 97 (2021 04:00), Max: 98 (2021 00:00)  HR: 86 (2021 06:43) (59 - 129)  BP: --  BP(mean): --  ABP: 96/65 (2021 06:00) (74/59 - 124/76)  ABP(mean): 76 (2021 06:00) (65 - 109)  RR: 40 (2021 06:00) (12 - 40)  SpO2: 100% (2021 06:43) (98% - 100%)      Physical Exam:  Gen:  Intubated   CNS: Sedated  Neck: no JVD  RES : clear , no wheezing              CVS: Regular  rhythm. Normal S1/S2  Abd: Soft, non-distended. Bowel sounds present.  Skin: No rash.  Ext:  no edema    ============================I/O===========================   I&O's Detail    2021 07:01  -  2021 07:00  --------------------------------------------------------  IN:    Dexmedetomidine: 78.6 mL    Enteral Tube Flush: 240 mL    Heparin: 230 mL    IV PiggyBack: 200 mL    Miscellaneous Tube Feedin mL    sodium chloride 0.9%: 55 mL  Total IN: 2183.6 mL    OUT:    Voided (mL): 1300 mL  Total OUT: 1300 mL    Total NET: 883.6 mL        ============================ LABS =========================                        9.1    8.73  )-----------( 288      ( 2021 00:45 )             30.9     -    132<L>  |  95<L>  |  12  ----------------------------<  154<H>  3.8   |  27  |  0.61    Ca    9.6      2021 00:46  Phos  3.2     -  Mg     1.9         TPro  8.3  /  Alb  3.9  /  TBili  0.3  /  DBili  x   /  AST  18  /  ALT  6<L>  /  AlkPhos  106  07    LIVER FUNCTIONS - ( 2021 00:46 )  Alb: 3.9 g/dL / Pro: 8.3 g/dL / ALK PHOS: 106 U/L / ALT: 6 U/L / AST: 18 U/L / GGT: x           PT/INR - ( 2021 14:04 )   PT: 13.7 sec;   INR: 1.15 ratio         PTT - ( 2021 20:20 )  PTT:48.7 sec  ABG - ( 2021 00:32 )  pH, Arterial: 7.40  pH, Blood: x     /  pCO2: 50    /  pO2: 144   / HCO3: 30    / Base Excess: 5.0   /  SaO2: 99                  ======================Micro/Rad/Cardio=================  Culture: Reviewed   CXR: Reviewed  Echo:Reviewed  ======================================================  PAST MEDICAL & SURGICAL HISTORY:  CHF (congestive heart failure)    CAD (coronary artery disease)    Depression    Pleural effusion    History of 2019 novel coronavirus disease (COVID-19)  2020    Hemorrhoids    Bleeding hemorrhoids    Peripheral arterial disease    Claudication    BPH with urinary obstruction    ACC/AHA stage D systolic heart failure    Anticoagulation goal of INR 2.0 to 2.5    Falls    Clavicle fracture    CKD (chronic kidney disease), stage III    Iron deficiency anemia    H/O epistaxis    Vertigo    GI bleed    S/P TVR (tricuspid valve repair)    S/P ventricular assist device    S/P endoscopy      ====================ASSESSMENT ==============  Stage D Nonischemic Cardiomyopathy, Status Post HM2 on 2017   Recent driveline infection on 2021   Cardiogenic shock  Hemodynamic instability   Acute hypoxemic respiratory failure  GI bleed , Status Post Enteroscopy   Anemia, in setting of melena   Chronic Kidney Disease  Stress hyperglycemia   C.diff positive on        Plan:  ====================== NEUROLOGY=====================  Sedated with IV Precedex drip   Assess neuro status per protocol when pt is off sedation.   Continue Tylenol PRN for analgesia  Klonopin for agitation    acetaminophen    Suspension .. 650 milliGRAM(s) Oral every 6 hours PRN Temp greater or equal to 38C (100.4F)  clonazePAM  Tablet 0.5 milliGRAM(s) Oral every 8 hours  dexMEDEtomidine Infusion 0.4 MICROgram(s)/kG/Hr (7.85 mL/Hr) IV Continuous <Continuous>    ==================== RESPIRATORY======================  Respiratory status required full ventilatory support with intermittent weaning to pressure support and trach collar, close monitoring of respiratory rate and breathing pattern, the   Patient had a moderate amount of thin secretions which required intermittent suctioning. Secretions were increased with associated coughing after several hours on trach collar.  following of ABG’s with A-line monitoring, continuous pulse oximetry monitoring.    Pt desatted after being on trach collar from 6am-11:30pm.   Continue use of bronchodilators.   Scopolamine for heavy secretions.   TC for 16 hours yesterday, back on TC today and will continue as tolerated.     ipratropium    for Nebulization 500 MICROGram(s) Nebulizer every 6 hours  scopolamine 1 mG/72 Hr(s) Patch 1 Patch Transdermal every 72 hours    ====================CARDIOVASCULAR==================  Stage D Nonischemic Cardiomyopathy, Status Post HM2 on 2017   HM2 settings: was initially 8000 but increased to 9000 rpm due to MR and to help with volume overload after transfusion and volume resuscitation.    LVAD speed increased to 9200 and flow 4.8  Echo 6/15: severe global Left ventricular systolic dysfunction with HM2 in place, decreased Right ventricular systolic function, interventricular septums bows slightly to right, otherwise grossly similar to prior.   Rate and rhythm control with Amiodarone.   Invasive hemodynamic monitoring, assess perfusion indices.   Pressor support with Hydralazine.     aMIOdarone    Tablet 200 milliGRAM(s) Oral daily  hydrALAZINE 10 milliGRAM(s) Oral every 8 hours PRN MAP greater than 85    ===================HEMATOLOGIC/ONC ===================  Anemia due to acute blood loss associated with small bowel bleed.   Monitor H&H/Plts  IV heparin and Warfarin for LVAD, and venous thromboembolism prophylaxis  in addition to sequential compression devices  Plan for afternoon INR and dose of coumadin tonight,     heparin  Infusion 400 Unit(s)/Hr (10 mL/Hr) IV Continuous <Continuous>    ===================== RENAL =========================  Continue to monitor I/Os, BUN/Creatinine, and urine output.   Goal net negative fluid balance. Replete lytes PRN. Keep K> 4 and Mg >2.     ==================== GASTROINTESTINAL===================  Tolerating Vital AF, @ goal 60cc/hr.   Continue Protonix for stress ulcer prophylaxis.     pantoprazole   Suspension 40 milliGRAM(s) Enteral Tube daily  sodium chloride 0.9%. 1000 milliLiter(s) (10 mL/Hr) IV Continuous <Continuous>    =======================    ENDOCRINE  =====================  Hx of DMT2, glycemic control with Admelog sliding scale  Monitor blood glucose levels.     insulin lispro (ADMELOG) corrective regimen sliding scale   SubCutaneous every 6 hours    ========================INFECTIOUS DISEASE================  Afebrile with WBC within normal range   Monitor temperature and trend WBC  : C.diff positive, will continue Vancomycin for C.diff   Continue cefazolin for bacteremia, d/jeff today  Repeat sputum culture has been sent for thick secretions     ceFAZolin   IVPB 2000 milliGRAM(s) IV Intermittent every 8 hours  vancomycin    Solution 500 milliGRAM(s) Oral every 6 hours          I have spent 35 minutes providing acute care for this critically ill patient     Patient requires continuous monitoring with bedside rhythm monitoring, pulse ox monitoring, and intermittent blood gas analysis. Care plan discussed with ICU care team. Patient remained critical and at risk for life threatening decompensation.     By signing my name below, I, Rojas Junior, attest that this documentation has been prepared under the direction and in the presence of Kota Thomas MD   Electronically signed: Cesia Cohen, 21 @ 07:21    I, Kota Thomas, personally performed the services described in this documentation. all medical record entries made by the scribe were at my direction and in my presence. I have reviewed the chart and agree that the record reflects my personal performance and is accurate and complete  Electronically signed: Kota Thomas MD

## 2021-07-07 NOTE — PROGRESS NOTE ADULT - ASSESSMENT
Assessment and Recommendation:   · Assessment	  Assessment and recommendation :  Recurrent Acute hypoxic respiratory Failure S/P tracheostomy on full ventilatory support alternating with trach. collar at 50% Fi02  Acute left lower lobe pneumonia  possible Aspiration pneumonia   Diarrhea +ve for C-diff. colitis on PO vancomycin and IV Flagyl   considered D/C contact isolation    on Ancef improved for bacteremia   Non ischemic cardiomyopathy continue ACE inhibitor and B-Blockers   S/P Septic shock and cardiogenic shock   Stage D systolic heart failure S/P LVAD HM2   MH2 LVAD  with  TV Annuloplasty  on IV Argatroban for LVAD   Severe peripheral vascular disease   S/P Anion gap metabolic acidosis  severe hyperglycemia on insulin coverage    On  Amiodarone and IV heparin   critical care polyneuropathy   Anemia of Acute blood Loss   S/P Small bowel bleeding   S/P blood and FFP transfusion   Chronic kidney disease stage III  Continue NGT feeding   GI prophylaxis  discuss with TCV surgeon   critical care time spend is 35 minutes

## 2021-07-07 NOTE — PROGRESS NOTE ADULT - SUBJECTIVE AND OBJECTIVE BOX
RICKY JOINT  MRN#: [id number]  Subjective:  Pulmonary progress  : recurrent Acute hypoxic respiratory Failure ,aspiration pneumonia, NICM  , chart reviewed and H/O obtained radiological and Laboratory study reviewed patient Examined     65M PMH ACC/AHA stage D HF due to NICM HM2 LVAD , TV annuloplasty ring 17 as destination therapy due to severe peripheral artery disease with significant stenosis  SIADH, Depression, CKD-3 with hyperkalemia, past E. coli UTIs, driveline drainage (21) and COVID-19 (back in 2020)  He was recently seen in clinic where he complained of abdominal pain and dark stools w constipation back in May. He presents to [facility] ER today weakness and fatigue, moderate and + Black stools for three days, on coumadin secondary to warfarin use in the setting of an LVAD. Patient has required transfusions for GIB in the past. Mostly recently back in 2021 pt had anemia with dark stools. No interventions was done at that time. However Last Endoscopy was done in 2020 (negative). Today labs show patient is anemic with H/H of 4.5/16.3,. INR is 8.84 MAP in the 90s, Temp 35.1. He denies any chest pain, shortness of breath, dizziness, abd pain, nausea or vomiting. found to have  rectal bleeding underwent endoscopy ,old blood in the proximal ileum ,  develop sepsis with LL opacity given Antibiotics , Extubated , reintubated , Bronchoscopy on Zosyn for LL pneumonia  and Amiodarone S/P TV Annuloplasty , patient remain intubated on full ventilatory support .S/P multiple units of blood transfusion , remain on full ventilatory support on Precedex and propofol , new central IJ line , diarrhea C diff. +ve on po vancomycin and IV Flagyl,  mildly distended belly , fever start on cefepime 2gm q 8 hrs S/P tracheostomy .  new RT Subclavian central line continue on contact  isolation ,still diarrhea on C-diff antibiotics ENT follow up appreciated , trial of C-PAP as tolerated , , copious secretion from trach. site chest x ray left lower lobe pneumonia , tolerating trch. collar 50% FI02 still excessive secretion need pulmonary toilet , on Ancef antibiotic , no more diarrhea back on full support mechanical ventilator , chest x ray show improvement in LLL air space disease, more awake and responsive on tube feeding no more diarrhea , afebrile on Ancef for bacteremia no fever          (2021 16:57)    PAST MEDICAL & SURGICAL HISTORY:  CHF (congestive heart failure)    CAD (coronary artery disease)  Depression    Pleural effusion    History of 2019 novel coronavirus disease (COVID-19)  2020    Hemorrhoids    Bleeding hemorrhoids    Peripheral arterial disease    Claudication    BPH with urinary obstruction    ACC/AHA stage D systolic heart failure  Anticoagulation goal of INR 2.0 to 2.5    Falls    Clavicle fracture    CKD (chronic kidney disease), stage III    Iron deficiency anemia    H/O epistaxis    Vertigo    GI bleed    S/P TVR (tricuspid valve repair)    S/P ventricular assist device    S/P endoscopy    OBJECTIVE:  ICU Vital Signs Last 24 Hrs  T(C): 38.2 (2021 10:00), Max: 38.5 (2021 12:00)  T(F): 100.8 (2021 10:00), Max: 101.3 (2021 12:00)  HR: 65 (2021 10:00) (61 - 69)  BP: --  BP(mean): --  ABP: 105/67 (2021 10:00) (90/54 - 113/64)  ABP(mean): 77 (2021 10:00) (63 - 77)  RR: 20 (2021 10:00) (19 - 35)  SpO2: 99% (2021 10:00) (96% - 100%)       @ : @ 07:00  --------------------------------------------------------  IN: 2693.9 mL / OUT: 1415 mL / NET: 1278.9 mL     @ 07: @ 10:49  --------------------------------------------------------  IN: 420.8 mL / OUT: 115 mL / NET: 305.8 mL  PHYSICAL EXAM:Daily   Elderly male S/P tracheostomy  sedated  on full ventilatory support /40%20/5cm PEEP  0n vasopressin , alternating with trach. collar on IV heparin   Daily Weight in k.4 (2021 00:00)  HEENT:     + NCAT  + EOMI  - Conjuctival edema   - Icterus   - Thrush   - ETT  + NGT/OGT  Neck:         + FROM   RT SC line  JVD     - Nodes     - Masses    + Mid-line trachea + Tracheostomy  Chest: normal findings  Lungs:          + CTA   + Rhonchi    - Rales    - Wheezing + Decreased  LT BS   - Dullness R L  Cardiac:       + S1 + S2    + RRR   - Irregular   - S3  - S4    - Murmurs   - Rub   - Hamman’s sign   Abdomen:    + BS     + Soft    + Non-tender     - Distended    - Organomegaly  - PEG  Extremities:   - Cyanosis U/L   - Clubbing  U/L  + LE/UE Edema   + Capillary refill    + Pulses   Neuro:        - Awake   -  Alert   - Confused   - Lethargic   + Sedated  + Generalized Weakness  Skin:        - Rashes    - Erythema   + Normal incisions   + IV sites intact          HOSPITAL MEDICATIONS: All mediciations reviewed and analyzed  MEDICATIONS  (STANDING):  aMIOdarone    Tablet 200 milliGRAM(s) Oral daily  chlorhexidine 0.12% Liquid 15 milliLiter(s) Oral Mucosa every 12 hours  chlorhexidine 2% Cloths 1 Application(s) Topical <User Schedule>  dexMEDEtomidine Infusion 0.5 MICROgram(s)/kG/Hr (9.81 mL/Hr) IV Continuous <Continuous>  dextrose 50% Injectable 50 milliLiter(s) IV Push every 15 minutes  heparin  Infusion 400 Unit(s)/Hr (12.5 mL/Hr) IV Continuous <Continuous>  HYDROmorphone  Injectable 0.5 milliGRAM(s) IV Push once  insulin lispro (ADMELOG) corrective regimen sliding scale   SubCutaneous every 6 hours  pantoprazole  Injectable 40 milliGRAM(s) IV Push every 12 hours  piperacillin/tazobactam IVPB.. 3.375 Gram(s) IV Intermittent every 8 hours  propofol Infusion 20 MICROgram(s)/kG/Min (9.42 mL/Hr) IV Continuous <Continuous>  sodium chloride 0.9% lock flush 3 milliLiter(s) IV Push every 8 hours  sodium chloride 0.9%. 1000 milliLiter(s) (10 mL/Hr) IV Continuous <Continuous>    MEDICATIONS  (PRN):  acetaminophen    Suspension .. 650 milliGRAM(s) Oral every 6 hours PRN Temp greater or equal to 38C (100.4F)    LABS: All Lab data reviewed and analyzed                                     9.1    8.73  )-----------( 288      ( 2021 00:45 )             30.9   07-07    132<L>  |  95<L>  |  12  ----------------------------<  154<H>  3.8   |  27  |  0.61    Ca    9.6      2021 00:46  Phos  3.2     07-07  Mg     1.9     07-07    TPro  8.3  /  Alb  3.9  /  TBili  0.3  /  DBili  x   /  AST  18  /  ALT  6<L>  /  AlkPhos  106  07-07    Ca    9.6      2021 23:35  Phos  3.3     07-05  Mg     2.0     07-05    TPro  8.3  /  Alb  4.0  /  TBili  0.3  /  DBili  x   /  AST  22  /  ALT  9<L>  /  AlkPhos  111  07-05    Ca    9.5      2021 00:39  Phos  3.0     07-05  Mg     2.1     07-05    TPro  8.1  /  Alb  3.5  /  TBili  0.3  /  DBili  x   /  AST  17  /  ALT  9<L>  /  AlkPhos  125<H>  07-    Ca    9.4      2021 00:28  Phos  3.1     07-04  Mg     1.8     07-04    TPro  8.1  /  Alb  3.8  /  TBili  0.3  /  DBili  x   /  AST  18  /  ALT  11  /  AlkPhos  127<H>  07-04                         PTT - ( 2021 04:52 )  PTT:45.2 sec LIVER FUNCTIONS - ( 2021 00:42 )  Alb: 3.4 g/dL / Pro: 6.7 g/dL / ALK PHOS: 213 U/L / ALT: 15 U/L / AST: 24 U/L / GGT: x           RADIOLOGY: - Reviewed and analyzed LLL  pneumonia , LVAD HM2, CT scan of abdomen reviewed result noted

## 2021-07-07 NOTE — PROGRESS NOTE ADULT - SUBJECTIVE AND OBJECTIVE BOX
INFECTIOUS DISEASES FOLLOW UP-- Anitra Prater  [phone]    This is a follow up note for this  66yMale with  Anemia        ROS:  CONSTITUTIONAL:  No fever, good appetite  CARDIOVASCULAR:  No chest pain or palpitations  RESPIRATORY:  No dyspnea  GASTROINTESTINAL:  No nausea, vomiting, diarrhea, or abdominal pain  GENITOURINARY:  No dysuria  NEUROLOGIC:  No headache,     Allergies    No Known Allergies    Intolerances        ANTIBIOTICS/RELEVANT:  antimicrobials  vancomycin    Solution 500 milliGRAM(s) Oral every 6 hours    immunologic:    OTHER:  acetaminophen    Suspension .. 650 milliGRAM(s) Oral every 6 hours PRN  aMIOdarone    Tablet 200 milliGRAM(s) Oral daily  chlorhexidine 0.12% Liquid 15 milliLiter(s) Oral Mucosa every 12 hours  chlorhexidine 2% Cloths 1 Application(s) Topical <User Schedule>  clonazePAM  Tablet 0.5 milliGRAM(s) Oral every 8 hours  dexMEDEtomidine Infusion 0.4 MICROgram(s)/kG/Hr IV Continuous <Continuous>  heparin  Infusion 400 Unit(s)/Hr IV Continuous <Continuous>  hydrALAZINE 10 milliGRAM(s) Oral every 8 hours PRN  insulin lispro (ADMELOG) corrective regimen sliding scale   SubCutaneous every 6 hours  pantoprazole   Suspension 40 milliGRAM(s) Enteral Tube daily  scopolamine 1 mG/72 Hr(s) Patch 1 Patch Transdermal every 72 hours  sodium chloride 0.9%. 1000 milliLiter(s) IV Continuous <Continuous>  warfarin 5 milliGRAM(s) Oral once      Objective:  Vital Signs Last 24 Hrs  T(C): 36.7 (07 Jul 2021 12:00), Max: 36.8 (07 Jul 2021 08:00)  T(F): 98 (07 Jul 2021 12:00), Max: 98.3 (07 Jul 2021 08:00)  HR: 79 (07 Jul 2021 17:00) (59 - 100)  BP: --  BP(mean): --  RR: 32 (07 Jul 2021 17:00) (12 - 40)  SpO2: 100% (07 Jul 2021 17:00) (98% - 100%)    PHYSICAL EXAM:  Constitutional:no acute distress, more alert today, sat in the chair  Eyes:ALONSO, EOMI  Ear/Nose/Throat: no oral lesions, trach site some bloody secretions underenath	  Respiratory: mechanical bilat  Cardiovascular: S1S2 LVAD sounds  Gastrointestinal:soft, but pt still notes tenderness  Extremities:no e/e/c  No Lymphadenopathy  IV sites not inflammed.    LABS:                        9.1    8.73  )-----------( 288      ( 07 Jul 2021 00:45 )             30.9     07-07    132<L>  |  95<L>  |  12  ----------------------------<  154<H>  3.8   |  27  |  0.61    Ca    9.6      07 Jul 2021 00:46  Phos  3.2     07-07  Mg     1.9     07-07    TPro  8.3  /  Alb  3.9  /  TBili  0.3  /  DBili  x   /  AST  18  /  ALT  6<L>  /  AlkPhos  106  07-07    PT/INR - ( 07 Jul 2021 14:26 )   PT: 13.7 sec;   INR: 1.15 ratio         PTT - ( 06 Jul 2021 20:20 )  PTT:48.7 sec      MICROBIOLOGY:            RECENT CULTURES:  07-07 @ 11:47  .Sputum Sputum  --  --  --  --  --  06-30 @ 20:57  .Sputum Sputum  --  --  --    Normal Respiratory Bria present  --      RADIOLOGY & ADDITIONAL STUDIES:    < from: Xray Chest 1 View- PORTABLE-Routine (Xray Chest 1 View- PORTABLE-Routine in AM.) (07.07.21 @ 02:55) >    The heart is enlarged. Elevated right hemidiaphragm. Otherwise the lungs appear to be clear. No pleural effusion. No pneumothorax. All life supporting devices are in good position and unchanged when compared to previous study done July 5, 2021 at 1:59 AM.    < end of copied text >

## 2021-07-07 NOTE — CHART NOTE - NSCHARTNOTEFT_GEN_A_CORE
Seen for: nutrition follow up   Source: patient, RN, NP, EMR, HF rounds    Chart reviewed, events noted. 65 year old male with PMHx of NICM, s/p  HM2 LVAD in 2017, on coumadin, CKD 3 presents with anemia, dark stools and supra therapeutic INR. S/p capsule study, endoscopy. Course c/b possible aspiration event. C-Diff +, being treated with vancomycin. S/p tracheostomy on . Intermittently on trach collar, tolerated 16 hours yesterday.     Diet, NPO with Tube Feed:   Tube Feeding Modality: Nasogastric  Vital AF (VITALAFRTH)  Total Volume for 24 Hours (mL): 1440  Continuous  Starting Tube Feed Rate {mL per Hour}: 10  Increase Tube Feed Rate by (mL): 10     Every 6 hours  Until Goal Tube Feed Rate (mL per Hour): 60  Tube Feed Duration (in Hours): 24  Tube Feed Start Time: 08:30  Banatrol TF     Qty per Day:  2  Supplement Feeding Modality:  Nasogastric  Probiotic Yogurt/Smoothie Cans or Servings Per Day:  2       Frequency:  Daily (21 @ 09:44)    EN Order Provides: 1728kcals and 108gm protein  - 22kcals/kg and 1.37gm pro/kg based on dosing weight: 78.5kg    EN provision per chart: >80% of needs x4 days    GI:  Last BM , loose.   Bowel Regimen: none at this time  - C. Diff +, ordered for Banatrol to aid in stool bulking  - Documented emesis  (2x/),  (2x) - per RN, not actual emesis, likely heavy secretions    Weight history:   Admission weight: 176.3 pounds / 80 kg (6/15)  Current weight: 162.7 pounds / 73.8 kg ()    Weights:   Daily Weight in k.8 (), Weight in k (), Weight in k.4 (), Weight in k.6 (), Weight in k (), Weight in k () - Weight fluctuations noted, must question accuracy given taken on bed scale.    MEDICATIONS  (STANDING):  aMIOdarone    Tablet  insulin lispro (ADMELOG) corrective regimen sliding scale  pantoprazole   Suspension  sodium chloride 0.9%.  vancomycin    Solution    Pertinent Labs:  @ 00:46: Na 132<L>, BUN 12, Cr 0.61, <H>, K+ 3.8, Phos 3.2, Mg 1.9, Alk Phos 106, ALT/SGPT 6<L>, AST/SGOT 18, HbA1c --    A1C with Estimated Average Glucose Result: 5.6 % (06-15-21 @ 02:42)    Finger Sticks:  POCT Blood Glucose.: 118 mg/dL ( @ 05:52)  POCT Blood Glucose.: 127 mg/dL ( @ 23:54)  POCT Blood Glucose.: 139 mg/dL ( @ 18:09)  POCT Blood Glucose.: 140 mg/dL ( @ 12:10)    Triglycerides, Serum: 145 mg/dL (21 @ 00:53)  Triglycerides, Serum: 191 mg/dL (21 @ 00:36)  Triglycerides, Serum: 183 mg/dL (21 @ 00:43)  Triglycerides, Serum: 518 mg/dL (21 @ 09:02)    Skin per chart: IAD, no pressure injuries  Edema per chart: 1+ generalized    Estimated Nutrition Needs:   - Using dosing weight 78.5 kg, considering vent support  - Energy Needs (20-25 kcal/kg): 0711-8576 kcal/day  - Protein Needs (1.2-1.4 g/kg):     Previous Nutrition Diagnosis:  Acute moderate malnutrition  Nutrition diagnosis is ongoing, being addressed with tube feeds as tolerated    New Nutrition Diagnosis: n/a       Recommendations:      1) Continue Vital AF @ 60 ml/hr x 24 hours, meets estimated nutrient needs at goal rate  2) Continue Probiotic smoothie and Banatrol to promote GI tolerance of feeds    Continue monitoring and evaluating:   - Labs: blood glucose, electrolytes, renal indices  - GI function and bowel movements  - Nutritional intake, weight trends, skin integrity    RD remains available PRN and will follow up per protocol.   Kandice Peña RD, CDN, [facility]   Pager # [phone]

## 2021-07-08 NOTE — PROGRESS NOTE ADULT - SUBJECTIVE AND OBJECTIVE BOX
Subjective:  No overnight events    Medications:  acetaminophen    Suspension .. 650 milliGRAM(s) Oral every 6 hours PRN  aMIOdarone    Tablet 200 milliGRAM(s) Oral daily  chlorhexidine 0.12% Liquid 15 milliLiter(s) Oral Mucosa every 12 hours  chlorhexidine 2% Cloths 1 Application(s) Topical <User Schedule>  clonazePAM  Tablet 0.5 milliGRAM(s) Oral every 8 hours  dexMEDEtomidine Infusion 0.4 MICROgram(s)/kG/Hr IV Continuous <Continuous>  furosemide    Tablet 40 milliGRAM(s) Oral <User Schedule>  heparin  Infusion 400 Unit(s)/Hr IV Continuous <Continuous>  hydrALAZINE 10 milliGRAM(s) Oral every 8 hours PRN  insulin lispro (ADMELOG) corrective regimen sliding scale   SubCutaneous every 6 hours  mirtazapine 7.5 milliGRAM(s) Oral at bedtime  pantoprazole   Suspension 40 milliGRAM(s) Enteral Tube daily  scopolamine 1 mG/72 Hr(s) Patch 1 Patch Transdermal every 72 hours  sodium chloride 0.9%. 1000 milliLiter(s) IV Continuous <Continuous>  vancomycin    Solution 500 milliGRAM(s) Oral every 6 hours  warfarin 6 milliGRAM(s) Oral <User Schedule>    Vitals:  T(C): 36.9 (21 @ 12:00), Max: 36.9 (21 @ 12:00)  HR: 68 (21 @ 17:00) (65 - 96)  BP: --  BP(mean): --  RR: 27 (21 @ 17:00) (17 - 32)  SpO2: 100% (21 @ 17:00) (97% - 100%)    Daily     Daily Weight in k.5 (2021 00:00)        I&O's Summary    2021 07:  -  2021 07:00  --------------------------------------------------------  IN: 2173.2 mL / OUT: 1500 mL / NET: 673.2 mL    2021 07:  -  2021 17:54  --------------------------------------------------------  IN: 936.6 mL / OUT: 1100 mL / NET: -163.4 mL        Physical Exam:  Appearance: No Acute Distress  HEENT: s/p trach, JVP mildly elevated   Cardiovascular: typical VAD hums   Respiratory: scattered rhonchi  Gastrointestinal: Soft, mildly tender, non-distended	  Skin: no skin lesions  Neurologic: Non-focal  Extremities: No LE edema, warm and well perfused  Psychiatry: blunted affect     LVAD interrogation   Flow: 4.7  Speed: 9200  PI: 6.0  Power: 5.1   Alarms: none  Changes to device programming: none       Labs:                        9.6    8.52  )-----------( 279      ( 2021 01:31 )             32.4     07-08    134<L>  |  97  |  13  ----------------------------<  126<H>  4.6   |  24  |  0.62    Ca    9.7      2021 01:31  Phos  3.3     07-08  Mg     2.0     07-08    TPro  8.6<H>  /  Alb  4.0  /  TBili  0.4  /  DBili  x   /  AST  18  /  ALT  5<L>  /  AlkPhos  105  07-08      PT/INR - ( 2021 14:34 )   PT: 14.2 sec;   INR: 1.19 ratio         PTT - ( 2021 17:15 )  PTT:72.0 sec          Lactate Dehydrogenase, Serum: 284 U/L ( @ 00:46)  Lactate Dehydrogenase, Serum: 292 U/L ( @ 23:35)

## 2021-07-08 NOTE — PROGRESS NOTE ADULT - SUBJECTIVE AND OBJECTIVE BOX
CRITICAL CARE ATTENDING - CTICU    MEDICATIONS  (STANDING):  aMIOdarone    Tablet 200 milliGRAM(s) Oral daily  chlorhexidine 0.12% Liquid 15 milliLiter(s) Oral Mucosa every 12 hours  chlorhexidine 2% Cloths 1 Application(s) Topical <User Schedule>  clonazePAM  Tablet 0.5 milliGRAM(s) Oral every 8 hours  dexMEDEtomidine Infusion 0.4 MICROgram(s)/kG/Hr (7.85 mL/Hr) IV Continuous <Continuous>  heparin  Infusion 400 Unit(s)/Hr (10 mL/Hr) IV Continuous <Continuous>  insulin lispro (ADMELOG) corrective regimen sliding scale   SubCutaneous every 6 hours  pantoprazole   Suspension 40 milliGRAM(s) Enteral Tube daily  scopolamine 1 mG/72 Hr(s) Patch 1 Patch Transdermal every 72 hours  sodium chloride 0.9%. 1000 milliLiter(s) (10 mL/Hr) IV Continuous <Continuous>  vancomycin    Solution 500 milliGRAM(s) Oral every 6 hours                                    9.6    8.52  )-----------( 279      ( 2021 01:31 )             32.4       07-08    134<L>  |  97  |  13  ----------------------------<  126<H>  4.6   |  24  |  0.62    Ca    9.7      2021 01:31  Phos  3.3     07-  Mg     2.0         TPro  8.6<H>  /  Alb  4.0  /  TBili  0.4  /  DBili  x   /  AST  18  /  ALT  5<L>  /  AlkPhos  105  -08      PT/INR - ( 2021 14:26 )   PT: 13.7 sec;   INR: 1.15 ratio         PTT - ( 2021 02:22 )  PTT:44.2 sec    Mode: OFF      Daily     Daily Weight in k.5 (2021 00:00)      - @ 07:01  -   @ 07:00  --------------------------------------------------------  IN: 2226.3 mL / OUT: 1500 mL / NET: 726.3 mL        Critically Ill patient  : [ ] preoperative ,   [ ] post operative   [x] Non Operative     Requires :  [x] Arterial Line   [x] Central Line  [ ] PA catheter  [ ] IABP  [ ] ECMO  [x ] LVAD  [ x] Ventilator  [ ] pacemaker [ ] Impella.                      [x] ABG's     [x] Pulse Oxymetry Monitoring  Bedside evaluation , monitoring , treatment of hemodynamics , fluids , IVP/ IVCD meds.        Diagnosis:     Admitted to CTU on  for melena with anemia     POD  - Tracheostomy     Respiratory failure     Resolving L Pneumonia / ARDS    LVAD patient     Ventilator Management:  [x ]AC-rest    [ x]CPAP-PS Wean    [x]Trach Collar     [ ]Extubate    [ ] T-Piece  [ ]peep>5     Hypovolemia     CHF- acute [ ]   chronic [x ]    systolic [x ]   diatolic [ ]          - Echo- EF - BiV            [ x] RV dysfunction          - Cxr-cardiomegally, edema          - Clinical-  [ ]inotropes   [ ]pressors   [ x]diuresis   [ ]IABP   [ ]ECMO   [x]LVAD   [x]Respiratory Failure.     Chronic Kidney Disease    Sepsis - L Pneumonia/ pneumonitis - resolving    IVCD anticoagulation with [x ] Heparin  [ ] Argatroban for LVAD circuit     Requires chest PT, pulmonary toilet, ambu bagging, suctioning to maintain SaO2,  patent airway and treat atelectasis.     Difficult weaning process - multiple organ system involvement in critically ill patient     Requires bedside physical therapy, mobilization and total detention care.     C.diff positive- IVCD Vancomycin    Agitation / Delirium           I, Luis Fernando Thompson, personally performed the services described in this documentation. All medical record entries made by the luisibe were at my direction and in my presence. I have reviewed the chart and agree that the record reflects my personal performance and is accurate and complete.   Luis Fernando Thompson MD.       By signing my name below, I, Brian Robin, attest that this documentation has been prepared under the direction and in the presence of Luis Fernando Thompson MD.   Electronically Signed: Cesia Gonzalez 21 @ 08:10        Discussed with CT surgeon, Physician Assistant - Nurse Practitioner- Critical care medicine team.   Dicussed at  AM / PM rounds.   Chart, labs , films reviewed.    Cumulative Critical Care Time Given Today:  CRITICAL CARE ATTENDING - CTICU    MEDICATIONS  (STANDING):  aMIOdarone    Tablet 200 milliGRAM(s) Oral daily  chlorhexidine 0.12% Liquid 15 milliLiter(s) Oral Mucosa every 12 hours  chlorhexidine 2% Cloths 1 Application(s) Topical <User Schedule>  clonazePAM  Tablet 0.5 milliGRAM(s) Oral every 8 hours  dexMEDEtomidine Infusion 0.4 MICROgram(s)/kG/Hr (7.85 mL/Hr) IV Continuous <Continuous>  heparin  Infusion 400 Unit(s)/Hr (10 mL/Hr) IV Continuous <Continuous>  insulin lispro (ADMELOG) corrective regimen sliding scale   SubCutaneous every 6 hours  pantoprazole   Suspension 40 milliGRAM(s) Enteral Tube daily  scopolamine 1 mG/72 Hr(s) Patch 1 Patch Transdermal every 72 hours  sodium chloride 0.9%. 1000 milliLiter(s) (10 mL/Hr) IV Continuous <Continuous>  vancomycin    Solution 500 milliGRAM(s) Oral every 6 hours                                    9.6    8.52  )-----------( 279      ( 2021 01:31 )             32.4       07-08    134<L>  |  97  |  13  ----------------------------<  126<H>  4.6   |  24  |  0.62    Ca    9.7      2021 01:31  Phos  3.3     07-  Mg     2.0         TPro  8.6<H>  /  Alb  4.0  /  TBili  0.4  /  DBili  x   /  AST  18  /  ALT  5<L>  /  AlkPhos  105  -08      PT/INR - ( 2021 14:26 )   PT: 13.7 sec;   INR: 1.15 ratio         PTT - ( 2021 02:22 )  PTT:44.2 sec    Mode: OFF      Daily     Daily Weight in k.5 (2021 00:00)      - @ 07:01  -   @ 07:00  --------------------------------------------------------  IN: 2226.3 mL / OUT: 1500 mL / NET: 726.3 mL        Critically Ill patient  : [ ] preoperative ,   [ ] post operative   [x] Non Operative     Requires :  [x] Arterial Line   [ ] Central Line  [ ] PA catheter  [ ] IABP  [ ] ECMO  [x ] LVAD  [ x] Ventilator  [ ] pacemaker [ ] Impella.                      [x] ABG's     [x] Pulse Oxymetry Monitoring  Bedside evaluation , monitoring , treatment of hemodynamics , fluids , IVP/ IVCD meds.        Diagnosis:     Admitted to CTU on  for melena with anemia     POD  - Tracheostomy     Respiratory failure     Resolving L Pneumonia / ARDS    LVAD patient     Ventilator Management:  [x ]AC-rest    [ x]CPAP-PS Wean    [x]Trach Collar     [ ]Extubate    [ ] T-Piece  [ ]peep>5     Hypovolemia     CHF- acute [ ]   chronic [x ]    systolic [x ]   diatolic [ ]          - Echo- EF - BiV            [ x] RV dysfunction          - Cxr-cardiomegally, edema          - Clinical-  [ ]inotropes   [ ]pressors   [ x]diuresis   [ ]IABP   [ ]ECMO   [x]LVAD   [x]Respiratory Failure.     Chronic Kidney Disease    Sepsis - L Pneumonia/ pneumonitis - resolved     IVCD anticoagulation with [x ] Heparin  [ ] Argatroban for LVAD circuit     Requires chest PT, pulmonary toilet, ambu bagging, suctioning to maintain SaO2,  patent airway and treat atelectasis.     Difficult weaning process - multiple organ system involvement in critically ill patient     Requires bedside physical therapy, mobilization and total shelter care.     C.diff positive    Agitation / Delirium / confusion          I, Luis Fernando Thompson, personally performed the services described in this documentation. All medical record entries made by the luisibe were at my direction and in my presence. I have reviewed the chart and agree that the record reflects my personal performance and is accurate and complete.   Luis Fernando Thompson MD.       By signing my name below, I, Brian Robin, attest that this documentation has been prepared under the direction and in the presence of Luis Fernando Thompson MD.   Electronically Signed: Cesia Gonzalez 21 @ 08:10        Discussed with CT surgeon, Physician Assistant - Nurse Practitioner- Critical care medicine team.   Dicussed at  AM / PM rounds.   Chart, labs , films reviewed.    Cumulative Critical Care Time Given Today:  20 min

## 2021-07-08 NOTE — PROGRESS NOTE ADULT - SUBJECTIVE AND OBJECTIVE BOX
INFECTIOUS DISEASES FOLLOW UP-- Anitra Prater  [phone]    This is a follow up note for this  66yMale with  Anemia  remains with difficulty weaning from vent        ROS:  CONSTITUTIONAL: awake, alert    Allergies    No Known Allergies    Intolerances        ANTIBIOTICS/RELEVANT:  antimicrobials  vancomycin    Solution 500 milliGRAM(s) Oral every 6 hours    immunologic:    OTHER:  acetaminophen    Suspension .. 650 milliGRAM(s) Oral every 6 hours PRN  aMIOdarone    Tablet 200 milliGRAM(s) Oral daily  chlorhexidine 0.12% Liquid 15 milliLiter(s) Oral Mucosa every 12 hours  chlorhexidine 2% Cloths 1 Application(s) Topical <User Schedule>  clonazePAM  Tablet 0.5 milliGRAM(s) Oral every 8 hours  dexMEDEtomidine Infusion 0.4 MICROgram(s)/kG/Hr IV Continuous <Continuous>  furosemide    Tablet 40 milliGRAM(s) Oral <User Schedule>  heparin  Infusion 400 Unit(s)/Hr IV Continuous <Continuous>  hydrALAZINE 10 milliGRAM(s) Oral every 8 hours PRN  insulin lispro (ADMELOG) corrective regimen sliding scale   SubCutaneous every 6 hours  mirtazapine 7.5 milliGRAM(s) Oral at bedtime  pantoprazole   Suspension 40 milliGRAM(s) Enteral Tube daily  scopolamine 1 mG/72 Hr(s) Patch 1 Patch Transdermal every 72 hours  sodium chloride 0.9%. 1000 milliLiter(s) IV Continuous <Continuous>  warfarin 6 milliGRAM(s) Oral <User Schedule>      Objective:  Vital Signs Last 24 Hrs  T(C): 36.9 (08 Jul 2021 12:00), Max: 36.9 (08 Jul 2021 12:00)  T(F): 98.5 (08 Jul 2021 12:00), Max: 98.5 (08 Jul 2021 12:00)  HR: 71 (08 Jul 2021 18:00) (65 - 96)  BP: --  BP(mean): --  RR: 17 (08 Jul 2021 18:00) (17 - 32)  SpO2: 100% (08 Jul 2021 18:00) (97% - 100%)    PHYSICAL EXAM:  Constitutional:no acute distress  Eyes:ALONSO, EOMI  Ear/Nose/Throat: no oral lesions,trach site secretions noted  	  Respiratory: clear BL  Cardiovascular: S1S2  Gastrointestinal:soft, but still a bit distended  Extremities:no e/e/c  No Lymphadenopathy  IV sites not inflammed.    LABS:                        9.6    8.52  )-----------( 279      ( 08 Jul 2021 01:31 )             32.4     07-08    134<L>  |  97  |  13  ----------------------------<  126<H>  4.6   |  24  |  0.62    Ca    9.7      08 Jul 2021 01:31  Phos  3.3     07-08  Mg     2.0     07-08    TPro  8.6<H>  /  Alb  4.0  /  TBili  0.4  /  DBili  x   /  AST  18  /  ALT  5<L>  /  AlkPhos  105  07-08    PT/INR - ( 08 Jul 2021 14:34 )   PT: 14.2 sec;   INR: 1.19 ratio         PTT - ( 08 Jul 2021 17:15 )  PTT:72.0 sec      MICROBIOLOGY:            RECENT CULTURES:  07-07 @ 11:47  .Sputum Sputum  --  --  --    Moderate Citrobacter freundii  Moderate Serratia marcescens  Normal Respiratory Bria present  --      RADIOLOGY & ADDITIONAL STUDIES:

## 2021-07-08 NOTE — PROGRESS NOTE ADULT - PROBLEM SELECTOR PLAN 5
-Continue w/ abxs for VAP tx per I  -C/w tx acute Cdiff, as above.  -One set of bcx w/ Staph lugdunensis which have since cleared, completed course of antibiotics

## 2021-07-08 NOTE — PROGRESS NOTE ADULT - PROBLEM SELECTOR PLAN 6
-No further active blood loss since correction of INR and no active bleeding seen on enteroscopy.  -Continue to trend CBC closely as anticoagulation re-initiated.  -Continue PPI

## 2021-07-08 NOTE — PROGRESS NOTE ADULT - ASSESSMENT
imp/rx:  Suspected aspiration event at time of Endoscopic procedure with subsequent development of C.diff diarrhea  Now with increasing leukocytosis and gram + cocci in clusters from blood culture sent 6/26 with organism ID/sensitivity pending  Started IV Vancomycin  Vanco CLAU=4 is borderline  Will change antibiotics to Cefazolin  Would change CVC lines as feasible   repeat blood cultures 6/29 x2 sets are negative  completed therapy with Cefazolin        Clostridiodes difficile :  Abdomen somewhat distended- but soft- loose stool noted this afternoon  Continue Vanco oral 500mg qid  But diarrhea improved when feeds were  held making it likely that the enteral feeds are also contributing to the ongoing diarrhea    Trach secretions  colonized with mixed gram negative cornelia  If he decompensates or becomes febrile will restart antibiotics with Meropenem        Morris Prater MD  [phone]  After 5pm/weekends [phone]

## 2021-07-08 NOTE — PROGRESS NOTE ADULT - PROBLEM SELECTOR PLAN 1
- Holding home metoprolol/lisinopril in setting of borderline MAPs  - Start lasix 40 mg PO daily, aim to keep O's > I's  - Continue Amiodarone 200mg PO daily.

## 2021-07-08 NOTE — PROGRESS NOTE ADULT - PROBLEM SELECTOR PLAN 2
-Current speed 9200 RPM  -c/w heparin with coumadin bridge for goal INR 2-3   -Will plan to hold aspirin indefinitely given recurrent GIB.  -Continue to monitor LDH daily.

## 2021-07-08 NOTE — PROGRESS NOTE ADULT - ASSESSMENT
65 YO M with a history of stage D NICM s/p HM2 on 9/2017 as DT (due to severe PAD) with TV ring, prior COVID-19 infection 4/2020, recurrent syncope post LVAD s/p ILR, and chronic abdominal pain with prior negative workup who was admitted with symptomatic anemia with Hgb 4.5 in setting of INR 8.8 without hemodynamic instability. He was transfused and underwent VCE which showed active bleeding in the mid small bowel but subsequent enteroscopy 6/15 did not reveal any active bleeding. He acutely decompensated after procedure with fever/hypertension, low flow alarms, and pulmonary infiltrate with hypoxia requiring intubation from probable aspiration PNA. His LDH is normal and there are no signs of overt LVAD dysfunction on echo though signs of insufficiently unloaded ventricle for which his speed has been increased. Course notable for inability to wean ventilator with persistent secretions for which he underwent tracheostomy as well as acute c diff colitis.     Primary goals at this time is to progress towards tracheostomy decannulation and continue physical therapy for severe deconditioning.

## 2021-07-08 NOTE — PROGRESS NOTE ADULT - SUBJECTIVE AND OBJECTIVE BOX
RICKY JOINT  MRN#: [id number]  Subjective:  Pulmonary progress  : recurrent Acute hypoxic respiratory Failure ,aspiration pneumonia, NICM  , chart reviewed and H/O obtained radiological and Laboratory study reviewed patient Examined     65M PMH ACC/AHA stage D HF due to NICM HM2 LVAD , TV annuloplasty ring 17 as destination therapy due to severe peripheral artery disease with significant stenosis  SIADH, Depression, CKD-3 with hyperkalemia, past E. coli UTIs, driveline drainage (21) and COVID-19 (back in 2020)  He was recently seen in clinic where he complained of abdominal pain and dark stools w constipation back in May. He presents to [facility] ER today weakness and fatigue, moderate and + Black stools for three days, on coumadin secondary to warfarin use in the setting of an LVAD. Patient has required transfusions for GIB in the past. Mostly recently back in 2021 pt had anemia with dark stools. No interventions was done at that time. However Last Endoscopy was done in 2020 (negative). Today labs show patient is anemic with H/H of 4.5/16.3,. INR is 8.84 MAP in the 90s, Temp 35.1. He denies any chest pain, shortness of breath, dizziness, abd pain, nausea or vomiting. found to have  rectal bleeding underwent endoscopy ,old blood in the proximal ileum ,  develop sepsis with LL opacity given Antibiotics , Extubated , reintubated , Bronchoscopy on Zosyn for LL pneumonia  and Amiodarone S/P TV Annuloplasty , patient remain intubated on full ventilatory support .S/P multiple units of blood transfusion , remain on full ventilatory support on Precedex and propofol , new central IJ line , diarrhea C diff. +ve on po vancomycin and IV Flagyl,  mildly distended belly , fever start on cefepime 2gm q 8 hrs S/P tracheostomy .  new RT Subclavian central line continue on contact  isolation ,still diarrhea on C-diff antibiotics ENT follow up appreciated , trial of C-PAP as tolerated , , copious secretion from trach. site chest x ray left lower lobe pneumonia , tolerating trch. collar 50% FI02 still excessive secretion need pulmonary toilet , on Ancef antibiotic , no more diarrhea back on full support mechanical ventilator , chest x ray show improvement in LLL air space disease, more awake and responsive on tube feeding no more diarrhea , afebrile on Ancef for bacteremia no fever ,ID follow up noted          (2021 16:57)    PAST MEDICAL & SURGICAL HISTORY:  CHF (congestive heart failure)    CAD (coronary artery disease)  Depression    Pleural effusion    History of 2019 novel coronavirus disease (COVID-19)  2020    Hemorrhoids    Bleeding hemorrhoids    Peripheral arterial disease    Claudication    BPH with urinary obstruction    ACC/AHA stage D systolic heart failure  Anticoagulation goal of INR 2.0 to 2.5    Falls    Clavicle fracture    CKD (chronic kidney disease), stage III    Iron deficiency anemia    H/O epistaxis    Vertigo    GI bleed    S/P TVR (tricuspid valve repair)    S/P ventricular assist device    S/P endoscopy    OBJECTIVE:  ICU Vital Signs Last 24 Hrs  T(C): 38.2 (2021 10:00), Max: 38.5 (2021 12:00)  T(F): 100.8 (2021 10:00), Max: 101.3 (2021 12:00)  HR: 65 (2021 10:00) (61 - 69)  BP: --  BP(mean): --  ABP: 105/67 (2021 10:00) (90/54 - 113/64)  ABP(mean): 77 (2021 10:00) (63 - 77)  RR: 20 (2021 10:00) (19 - 35)  SpO2: 99% (2021 10:00) (96% - 100%)       @ : @ 07:00  --------------------------------------------------------  IN: 2693.9 mL / OUT: 1415 mL / NET: 1278.9 mL     @ 07:  -   @ 10:49  --------------------------------------------------------  IN: 420.8 mL / OUT: 115 mL / NET: 305.8 mL  PHYSICAL EXAM:Daily   Elderly male S/P tracheostomy  sedated  on full ventilatory support /40%20/5cm PEEP  0n vasopressin , alternating with trach. collar on IV heparin   Daily Weight in k.4 (2021 00:00)  HEENT:     + NCAT  + EOMI  - Conjuctival edema   - Icterus   - Thrush   - ETT  + NGT/OGT  Neck:         + FROM   RT SC line  JVD     - Nodes     - Masses    + Mid-line trachea + Tracheostomy  Chest: normal findings  Lungs:          + CTA   + Rhonchi    - Rales    - Wheezing + Decreased  LT BS   - Dullness R L  Cardiac:       + S1 + S2    + RRR   - Irregular   - S3  - S4    - Murmurs   - Rub   - Hamman’s sign   Abdomen:    + BS     + Soft    + Non-tender     - Distended    - Organomegaly  - PEG  Extremities:   - Cyanosis U/L   - Clubbing  U/L  + LE/UE Edema   + Capillary refill    + Pulses   Neuro:        - Awake   -  Alert   - Confused   - Lethargic   + Sedated  + Generalized Weakness  Skin:        - Rashes    - Erythema   + Normal incisions   + IV sites intact          HOSPITAL MEDICATIONS: All mediciations reviewed and analyzed  MEDICATIONS  (STANDING):  aMIOdarone    Tablet 200 milliGRAM(s) Oral daily  chlorhexidine 0.12% Liquid 15 milliLiter(s) Oral Mucosa every 12 hours  chlorhexidine 2% Cloths 1 Application(s) Topical <User Schedule>  dexMEDEtomidine Infusion 0.5 MICROgram(s)/kG/Hr (9.81 mL/Hr) IV Continuous <Continuous>  dextrose 50% Injectable 50 milliLiter(s) IV Push every 15 minutes  heparin  Infusion 400 Unit(s)/Hr (12.5 mL/Hr) IV Continuous <Continuous>  HYDROmorphone  Injectable 0.5 milliGRAM(s) IV Push once  insulin lispro (ADMELOG) corrective regimen sliding scale   SubCutaneous every 6 hours  pantoprazole  Injectable 40 milliGRAM(s) IV Push every 12 hours  piperacillin/tazobactam IVPB.. 3.375 Gram(s) IV Intermittent every 8 hours  propofol Infusion 20 MICROgram(s)/kG/Min (9.42 mL/Hr) IV Continuous <Continuous>  sodium chloride 0.9% lock flush 3 milliLiter(s) IV Push every 8 hours  sodium chloride 0.9%. 1000 milliLiter(s) (10 mL/Hr) IV Continuous <Continuous>    MEDICATIONS  (PRN):  acetaminophen    Suspension .. 650 milliGRAM(s) Oral every 6 hours PRN Temp greater or equal to 38C (100.4F)    LABS: All Lab data reviewed and analyzed                         9.6    8.52  )-----------( 279      ( 2021 01:31 )             32.4    07-08    134<L>  |  97  |  13  ----------------------------<  126<H>  4.6   |  24  |  0.62    Ca    9.7      2021 01:31  Phos  3.3     07-08  Mg     2.0     07-08    TPro  8.6<H>  /  Alb  4.0  /  TBili  0.4  /  DBili  x   /  AST  18  /  ALT  5<L>  /  AlkPhos  105  07-08    Ca    9.6      2021 00:46  Phos  3.2     07-07  Mg     1.9     07-07    TPro  8.3  /  Alb  3.9  /  TBili  0.3  /  DBili  x   /  AST  18  /  ALT  6<L>  /  AlkPhos  106  07-07    Ca    9.6      2021 23:35  Phos  3.3     07-05  Mg     2.0     07-05    TPro  8.3  /  Alb  4.0  /  TBili  0.3  /  DBili  x   /  AST  22  /  ALT  9<L>  /  AlkPhos  111  07-05                         PTT - ( 2021 04:52 )  PTT:45.2 sec LIVER FUNCTIONS - ( 2021 00:42 )  Alb: 3.4 g/dL / Pro: 6.7 g/dL / ALK PHOS: 213 U/L / ALT: 15 U/L / AST: 24 U/L / GGT: x           RADIOLOGY: - Reviewed and analyzed LLL  pneumonia , LVAD HM2, CT scan of abdomen reviewed result noted

## 2021-07-09 NOTE — PROGRESS NOTE ADULT - SUBJECTIVE AND OBJECTIVE BOX
RICKY JOINT  MRN#: [id number]  Subjective:  Pulmonary progress  : recurrent Acute hypoxic respiratory Failure ,aspiration pneumonia, NICM  , chart reviewed and H/O obtained radiological and Laboratory study reviewed patient Examined     65M PMH ACC/AHA stage D HF due to NICM HM2 LVAD , TV annuloplasty ring 17 as destination therapy due to severe peripheral artery disease with significant stenosis  SIADH, Depression, CKD-3 with hyperkalemia, past E. coli UTIs, driveline drainage (21) and COVID-19 (back in 2020)  He was recently seen in clinic where he complained of abdominal pain and dark stools w constipation back in May. He presents to [facility] ER today weakness and fatigue, moderate and + Black stools for three days, on coumadin secondary to warfarin use in the setting of an LVAD. Patient has required transfusions for GIB in the past. Mostly recently back in 2021 pt had anemia with dark stools. No interventions was done at that time. However Last Endoscopy was done in 2020 (negative). Today labs show patient is anemic with H/H of 4.5/16.3,. INR is 8.84 MAP in the 90s, Temp 35.1. He denies any chest pain, shortness of breath, dizziness, abd pain, nausea or vomiting. found to have  rectal bleeding underwent endoscopy ,old blood in the proximal ileum ,  develop sepsis with LL opacity given Antibiotics , Extubated , reintubated , Bronchoscopy on Zosyn for LL pneumonia  and Amiodarone S/P TV Annuloplasty , patient remain intubated on full ventilatory support .S/P multiple units of blood transfusion , remain on full ventilatory support on Precedex and propofol , new central IJ line , diarrhea C diff. +ve on po vancomycin and IV Flagyl,  mildly distended belly , fever start on cefepime 2gm q 8 hrs S/P tracheostomy .  new RT Subclavian central line continue on contact  isolation ,still diarrhea on C-diff antibiotics ENT follow up appreciated , trial of C-PAP as tolerated , , copious secretion from trach. site chest x ray left lower lobe pneumonia , tolerating trch. collar 50% FI02 still excessive secretion need pulmonary toilet , on Ancef antibiotic , no more diarrhea back on full support mechanical ventilator , chest x ray show improvement in LLL air space disease, more awake and responsive on tube feeding no more diarrhea , afebrile on Ancef for bacteremia no fever ,ID follow up noted , recurrent diarrhea from laxative          (2021 16:57)    PAST MEDICAL & SURGICAL HISTORY:  CHF (congestive heart failure)    CAD (coronary artery disease)  Depression    Pleural effusion    History of 2019 novel coronavirus disease (COVID-19)  2020    Hemorrhoids    Bleeding hemorrhoids    Peripheral arterial disease    Claudication    BPH with urinary obstruction    ACC/AHA stage D systolic heart failure  Anticoagulation goal of INR 2.0 to 2.5    Falls    Clavicle fracture    CKD (chronic kidney disease), stage III    Iron deficiency anemia    H/O epistaxis    Vertigo    GI bleed    S/P TVR (tricuspid valve repair)    S/P ventricular assist device    S/P endoscopy    OBJECTIVE:  ICU Vital Signs Last 24 Hrs  T(C): 38.2 (2021 10:00), Max: 38.5 (2021 12:00)  T(F): 100.8 (2021 10:00), Max: 101.3 (2021 12:00)  HR: 65 (2021 10:00) (61 - 69)  BP: --  BP(mean): --  ABP: 105/67 (2021 10:00) (90/54 - 113/64)  ABP(mean): 77 (2021 10:00) (63 - 77)  RR: 20 (2021 10:00) (19 - 35)  SpO2: 99% (2021 10:00) (96% - 100%)       @ 07:  -   @ 07:00  --------------------------------------------------------  IN: 2693.9 mL / OUT: 1415 mL / NET: 1278.9 mL     @ 07:01  -   @ 10:49  --------------------------------------------------------  IN: 420.8 mL / OUT: 115 mL / NET: 305.8 mL  PHYSICAL EXAM:Daily   Elderly male S/P tracheostomy   on full ventilatory support /40%20/5cm PEEP  0n vasopressin , alternating with trach. collar on IV heparin   Daily Weight in k.4 (2021 00:00)  HEENT:     + NCAT  + EOMI  - Conjuctival edema   - Icterus   - Thrush   - ETT  + NGT/OGT  Neck:         + FROM   RT SC line  JVD     - Nodes     - Masses    + Mid-line trachea + Tracheostomy  Chest: normal findings  Lungs:          + CTA   + Rhonchi    - Rales    - Wheezing + Decreased  LT BS   - Dullness R L  Cardiac:       + S1 + S2    + RRR   - Irregular   - S3  - S4    - Murmurs   - Rub   - Hamman’s sign   Abdomen:    + BS     + Soft    + Non-tender     - Distended    - Organomegaly  - PEG  Extremities:   - Cyanosis U/L   - Clubbing  U/L  + LE/UE Edema   + Capillary refill    + Pulses   Neuro:        - Awake   -  Alert   - Confused   - Lethargic   + Sedated  + Generalized Weakness  Skin:        - Rashes    - Erythema   + Normal incisions   + IV sites intact          HOSPITAL MEDICATIONS: All mediciations reviewed and analyzed  MEDICATIONS  (STANDING):  aMIOdarone    Tablet 200 milliGRAM(s) Oral daily  chlorhexidine 0.12% Liquid 15 milliLiter(s) Oral Mucosa every 12 hours  chlorhexidine 2% Cloths 1 Application(s) Topical <User Schedule>  dexMEDEtomidine Infusion 0.5 MICROgram(s)/kG/Hr (9.81 mL/Hr) IV Continuous <Continuous>  dextrose 50% Injectable 50 milliLiter(s) IV Push every 15 minutes  heparin  Infusion 400 Unit(s)/Hr (12.5 mL/Hr) IV Continuous <Continuous>  HYDROmorphone  Injectable 0.5 milliGRAM(s) IV Push once  insulin lispro (ADMELOG) corrective regimen sliding scale   SubCutaneous every 6 hours  pantoprazole  Injectable 40 milliGRAM(s) IV Push every 12 hours  piperacillin/tazobactam IVPB.. 3.375 Gram(s) IV Intermittent every 8 hours  propofol Infusion 20 MICROgram(s)/kG/Min (9.42 mL/Hr) IV Continuous <Continuous>  sodium chloride 0.9% lock flush 3 milliLiter(s) IV Push every 8 hours  sodium chloride 0.9%. 1000 milliLiter(s) (10 mL/Hr) IV Continuous <Continuous>    MEDICATIONS  (PRN):  acetaminophen    Suspension .. 650 milliGRAM(s) Oral every 6 hours PRN Temp greater or equal to 38C (100.4F)    LABS: All Lab data reviewed and analyzed                        10.0   9.82  )-----------( 276      ( 2021 00:42 )             33.6    07-09    133<L>  |  94<L>  |  16  ----------------------------<  130<H>  4.1   |  27  |  0.69    Ca    10.4      2021 00:42  Phos  4.4     07-09  Mg     2.0     07-09    TPro  9.1<H>  /  Alb  4.4  /  TBili  0.4  /  DBili  x   /  AST  18  /  ALT  6<L>  /  AlkPhos  110  07-09    Ca    9.7      2021 01:31  Phos  3.3     07-08  Mg     2.0     07-08    TPro  8.6<H>  /  Alb  4.0  /  TBili  0.4  /  DBili  x   /  AST  18  /  ALT  5<L>  /  AlkPhos  105  07-08    Ca    9.6      2021 00:46  Phos  3.2     07-07  Mg     1.9     07-07    TPro  8.3  /  Alb  3.9  /  TBili  0.3  /  DBili  x   /  AST  18  /  ALT  6<L>  /  AlkPhos  106  07-07    Ca    9.6      2021 23:35  Phos  3.3     07-05  Mg     2.0     07-05    TPro  8.3  /  Alb  4.0  /  TBili  0.3  /  DBili  x   /  AST  22  /  ALT  9<L>  /  AlkPhos  111  07-05                         PTT - ( 2021 04:52 )  PTT:45.2 sec LIVER FUNCTIONS - ( 2021 00:42 )  Alb: 3.4 g/dL / Pro: 6.7 g/dL / ALK PHOS: 213 U/L / ALT: 15 U/L / AST: 24 U/L / GGT: x           RADIOLOGY: - Reviewed and analyzed LLL  pneumonia , LVAD HM2, CT scan of abdomen reviewed result noted

## 2021-07-09 NOTE — PROGRESS NOTE ADULT - ASSESSMENT
imp/rx:  Suspected aspiration event at time of Endoscopic procedure with subsequent development of C.diff diarrhea  Now with increasing leukocytosis and gram + cocci in clusters from blood culture sent 6/26 with organism ID/sensitivity pending  Started IV Vancomycin  Vanco CLAU=4 is borderline  Will change antibiotics to Cefazolin  Would change CVC lines as feasible   repeat blood cultures 6/29 x2 sets are negative  completed therapy with Cefazolin        Clostridiodes difficile :  Abdomen somewhat distended- but soft- loose stool again noted  WBC within the normal range  Continue Vanco oral 500mg qid  But diarrhea improved when feeds were  held making it likely that the enteral feeds are also contributing to the ongoing diarrhea    Trach secretions  colonized with mixed gram negative cornelia including citrobacter and serratia  If he decompensates or becomes febrile would restart antibiotics with Meropenem to cover the citrobacter and the serratia colonizing his sputum        Morris Prater MD  [phone]  After 5pm/weekends [phone]

## 2021-07-09 NOTE — PROGRESS NOTE ADULT - ASSESSMENT
67 YO M with a history of stage D NICM s/p HM2 on 9/2017 as DT (due to severe PAD) with TV ring, prior COVID-19 infection 4/2020, recurrent syncope post LVAD s/p ILR, and chronic abdominal pain with prior negative workup who was admitted with symptomatic anemia with Hgb 4.5 in setting of INR 8.8 without hemodynamic instability. He was transfused and underwent VCE which showed active bleeding in the mid small bowel but subsequent enteroscopy 6/15 did not reveal any active bleeding. He acutely decompensated after procedure with fever/hypertension, low flow alarms, and pulmonary infiltrate with hypoxia requiring intubation from probable aspiration PNA. His LDH is normal and there are no signs of overt LVAD dysfunction on echo though signs of insufficiently unloaded ventricle for which his speed has been increased. Course notable for inability to wean ventilator with persistent secretions for which he underwent tracheostomy as well as acute c diff colitis.     Primary goals at this time is to progress towards tracheostomy decannulation and continue physical therapy for severe deconditioning. He is tolerating increasing duration of trach collar.

## 2021-07-09 NOTE — PROGRESS NOTE ADULT - SUBJECTIVE AND OBJECTIVE BOX
CRITICAL CARE ATTENDING - CTICU    MEDICATIONS  (STANDING):  aMIOdarone    Tablet 200 milliGRAM(s) Oral daily  chlorhexidine 0.12% Liquid 15 milliLiter(s) Oral Mucosa every 12 hours  chlorhexidine 2% Cloths 1 Application(s) Topical <User Schedule>  clonazePAM  Tablet 0.5 milliGRAM(s) Oral every 8 hours  dexMEDEtomidine Infusion 0.4 MICROgram(s)/kG/Hr (7.85 mL/Hr) IV Continuous <Continuous>  furosemide    Tablet 40 milliGRAM(s) Oral <User Schedule>  heparin  Infusion 400 Unit(s)/Hr (11 mL/Hr) IV Continuous <Continuous>  insulin lispro (ADMELOG) corrective regimen sliding scale   SubCutaneous every 6 hours  mirtazapine 7.5 milliGRAM(s) Oral at bedtime  pantoprazole   Suspension 40 milliGRAM(s) Enteral Tube daily  scopolamine 1 mG/72 Hr(s) Patch 1 Patch Transdermal every 72 hours  sodium chloride 0.9%. 1000 milliLiter(s) (10 mL/Hr) IV Continuous <Continuous>  vancomycin    Solution 500 milliGRAM(s) Oral every 6 hours                                    10.0   9.82  )-----------( 276      ( 09 Jul 2021 00:42 )             33.6       07-09    133<L>  |  94<L>  |  16  ----------------------------<  130<H>  4.1   |  27  |  0.69    Ca    10.4      09 Jul 2021 00:42  Phos  4.4     07-09  Mg     2.0     07-09    TPro  9.1<H>  /  Alb  4.4  /  TBili  0.4  /  DBili  x   /  AST  18  /  ALT  6<L>  /  AlkPhos  110  07-09      PT/INR - ( 08 Jul 2021 14:34 )   PT: 14.2 sec;   INR: 1.19 ratio         PTT - ( 09 Jul 2021 06:36 )  PTT:61.1 sec    Mode: vent off      Daily     Daily       07-08 @ 07:01  -  07-09 @ 07:00  --------------------------------------------------------  IN: 2124.6 mL / OUT: 1935 mL / NET: 189.6 mL        Critically Ill patient  : [ ] preoperative ,   [ ] post operative   [x] Non Operative     Requires :  [x] Arterial Line   [ ] Central Line  [ ] PA catheter  [ ] IABP  [ ] ECMO  [x ] LVAD  [ x] Ventilator  [ ] pacemaker [ ] Impella.                      [x] ABG's     [x] Pulse Oxymetry Monitoring  Bedside evaluation , monitoring , treatment of hemodynamics , fluids , IVP/ IVCD meds.        Diagnosis:     Admitted to CTU on 6/14 for melena with anemia     POD 6/25 - Tracheostomy     Respiratory failure     Resolving L Pneumonia / ARDS    LVAD patient     Ventilator Management:  [x ]AC-rest    [ x]CPAP-PS Wean    [x]Trach Collar     [ ]Extubate    [ ] T-Piece  [ ]peep>5     Hypovolemia     CHF- acute [ ]   chronic [x ]    systolic [x ]   diatolic [ ]          - Echo- EF - BiV            [ x] RV dysfunction          - Cxr-cardiomegally, edema          - Clinical-  [ ]inotropes   [ ]pressors   [ x]diuresis   [ ]IABP   [ ]ECMO   [x]LVAD   [x]Respiratory Failure.     Chronic Kidney Disease    Sepsis - L Pneumonia/ pneumonitis - resolved     IVCD anticoagulation with [x ] Heparin  [ ] Argatroban for LVAD circuit     Requires chest PT, pulmonary toilet, ambu bagging, suctioning to maintain SaO2,  patent airway and treat atelectasis.     Difficult weaning process - multiple organ system involvement in critically ill patient     Requires bedside physical therapy, mobilization and total retirement care.     C.diff positive    Sedated with IVCD Precedex - Agitation / Delirium / confusion    Hyponatremia               By signing my name below, I, Agnieszka Cherry, attest that this documentation has been prepared under the direction and in the presence of Luis Fernando Thompson MD.   Electronically Signed: Cesia Shaffer 07-09-21 @ 07:34      Discussed with CT surgeon, Physician Assistant - Nurse Practitioner- Critical care medicine team.   Discussed at  AM / PM rounds.   Chart, labs , films reviewed.    Cumulative Critical Care Time Given Today:  CRITICAL CARE ATTENDING - CTICU    MEDICATIONS  (STANDING):  aMIOdarone    Tablet 200 milliGRAM(s) Oral daily  chlorhexidine 0.12% Liquid 15 milliLiter(s) Oral Mucosa every 12 hours  chlorhexidine 2% Cloths 1 Application(s) Topical <User Schedule>  clonazePAM  Tablet 0.5 milliGRAM(s) Oral every 8 hours  dexMEDEtomidine Infusion 0.4 MICROgram(s)/kG/Hr (7.85 mL/Hr) IV Continuous <Continuous>  furosemide    Tablet 40 milliGRAM(s) Oral <User Schedule>  heparin  Infusion 400 Unit(s)/Hr (11 mL/Hr) IV Continuous <Continuous>  insulin lispro (ADMELOG) corrective regimen sliding scale   SubCutaneous every 6 hours  mirtazapine 7.5 milliGRAM(s) Oral at bedtime  pantoprazole   Suspension 40 milliGRAM(s) Enteral Tube daily  scopolamine 1 mG/72 Hr(s) Patch 1 Patch Transdermal every 72 hours  sodium chloride 0.9%. 1000 milliLiter(s) (10 mL/Hr) IV Continuous <Continuous>  vancomycin    Solution 500 milliGRAM(s) Oral every 6 hours                                    10.0   9.82  )-----------( 276      ( 09 Jul 2021 00:42 )             33.6       07-09    133<L>  |  94<L>  |  16  ----------------------------<  130<H>  4.1   |  27  |  0.69    Ca    10.4      09 Jul 2021 00:42  Phos  4.4     07-09  Mg     2.0     07-09    TPro  9.1<H>  /  Alb  4.4  /  TBili  0.4  /  DBili  x   /  AST  18  /  ALT  6<L>  /  AlkPhos  110  07-09      PT/INR - ( 08 Jul 2021 14:34 )   PT: 14.2 sec;   INR: 1.19 ratio         PTT - ( 09 Jul 2021 06:36 )  PTT:61.1 sec    Mode: vent off      Daily     Daily       07-08 @ 07:01  -  07-09 @ 07:00  --------------------------------------------------------  IN: 2124.6 mL / OUT: 1935 mL / NET: 189.6 mL        Critically Ill patient  : [ ] preoperative ,   [ ] post operative   [x] Non Operative     Requires :  [x] Arterial Line   [ ] Central Line  [ ] PA catheter  [ ] IABP  [ ] ECMO  [x ] LVAD  [ x] Ventilator  [ ] pacemaker [ ] Impella.                      [x] ABG's     [x] Pulse Oxymetry Monitoring  Bedside evaluation , monitoring , treatment of hemodynamics , fluids , IVP/ IVCD meds.        Diagnosis:     Admitted to CTU on 6/14 for melena with anemia     POD 6/25 - Tracheostomy     Respiratory failure     Resolving L Pneumonia / ARDS    LVAD patient     Ventilator Management:  [x ]AC-rest    [ x]CPAP-PS Wean    [x]Trach Collar     [ ]Extubate    [ ] T-Piece  [ ]peep>5     Hypovolemia     CHF- acute [ ]   chronic [x ]    systolic [x ]   diatolic [ ]          - Echo- EF - BiV            [ x] RV dysfunction          - Cxr-cardiomegally, edema          - Clinical-  [ ]inotropes   [ ]pressors   [ x]diuresis   [ ]IABP   [ ]ECMO   [x]LVAD   [x]Respiratory Failure.     Chronic Kidney Disease    Sepsis - L Pneumonia/ pneumonitis - resolved     IVCD anticoagulation with [x ] Heparin  [ ] Argatroban for LVAD circuit     Requires chest PT, pulmonary toilet, ambu bagging, suctioning to maintain SaO2,  patent airway and treat atelectasis.     Difficult weaning process - multiple organ system involvement in critically ill patient     Requires bedside physical therapy, mobilization and total retirement care.     C.diff positive    Sedated with IVCD Precedex - Agitation / Delirium / confusion    Hyponatremia     Tolerates NG / NJ feeds at [x ] goal rate    [ ] trophic rate    [ ]       rate               By signing my name below, IAgnieszka, attest that this documentation has been prepared under the direction and in the presence of Luis Fernando Thompson MD.   Electronically Signed: Cesia Shaffer 07-09-21 @ 07:34    ILuis Fernando, personally performed the services described in this documentation. All medical record entries made by the scribe were at my direction and in my presence. I have reviewed the chart and agree that the record reflects my personal performance and is accurate and complete.   Luis Fernando Thompson MD.       Discussed with CT surgeon, Physician Assistant - Nurse Practitioner- Critical care medicine team.   Discussed at  AM / PM rounds.   Chart, labs , films reviewed.    Cumulative Critical Care Time Given Today:  30 min

## 2021-07-09 NOTE — PROGRESS NOTE ADULT - ASSESSMENT
Assessment and Recommendation:   · Assessment	  Assessment and recommendation :  Recurrent Acute hypoxic respiratory Failure S/P tracheostomy on full ventilatory support alternating with trach. collar at 50% Fi02  Acute left lower lobe pneumonia  possible Aspiration pneumonia   Diarrhea +ve for C-diff. colitis on PO vancomycin and IV Flagyl   considered D/C contact isolation    on Ancef improved for bacteremia   Non ischemic cardiomyopathy continue ACE inhibitor and B-Blockers   S/P Septic shock and cardiogenic shock   Stage D systolic heart failure S/P LVAD HM2   MH2 LVAD  with  TV Annuloplasty  on IV Argatroban for LVAD   Severe peripheral vascular disease   S/P Anion gap metabolic acidosis  severe hyperglycemia on insulin coverage    On  Amiodarone and IV heparin   critical care polyneuropathy   Anemia of Acute blood Loss   S/P Small bowel bleeding   S/P blood and FFP transfusion   Chronic kidney disease stage III  Continue NGT feeding   GI prophylaxis  discuss with TCV surgeon

## 2021-07-09 NOTE — PROGRESS NOTE ADULT - PROBLEM SELECTOR PLAN 1
- Restart metoprolol succinate 25 mg daily. Will reintroduce home lisinopril as tolerates  - Continue lasix but decrease to 40 mg PO daily  - Continue home amiodarone 200mg PO daily.

## 2021-07-09 NOTE — PROGRESS NOTE ADULT - SUBJECTIVE AND OBJECTIVE BOX
INFECTIOUS DISEASES FOLLOW UP-- Anitra Prater  [phone]    This is a follow up note for this  66yMale with  Anemia  no significant clinical change in the past 24 hours        ROS:  CONSTITUTIONAL: awake, alert, grumpy    Allergies    No Known Allergies    Intolerances        ANTIBIOTICS/RELEVANT:  antimicrobials  vancomycin    Solution 500 milliGRAM(s) Oral every 6 hours    immunologic:    OTHER:  acetaminophen    Suspension .. 650 milliGRAM(s) Oral every 6 hours PRN  aMIOdarone    Tablet 200 milliGRAM(s) Oral daily  chlorhexidine 0.12% Liquid 15 milliLiter(s) Oral Mucosa every 12 hours  chlorhexidine 2% Cloths 1 Application(s) Topical <User Schedule>  clonazePAM  Tablet 0.5 milliGRAM(s) Oral every 8 hours  dexMEDEtomidine Infusion 0.4 MICROgram(s)/kG/Hr IV Continuous <Continuous>  furosemide    Tablet 40 milliGRAM(s) Oral daily  heparin  Infusion 400 Unit(s)/Hr IV Continuous <Continuous>  hydrALAZINE 10 milliGRAM(s) Oral every 8 hours PRN  insulin lispro (ADMELOG) corrective regimen sliding scale   SubCutaneous every 6 hours  metoclopramide Injectable 10 milliGRAM(s) IV Push every 8 hours  metoprolol tartrate 25 milliGRAM(s) Oral every 12 hours  mirtazapine 7.5 milliGRAM(s) Oral at bedtime  pantoprazole   Suspension 40 milliGRAM(s) Enteral Tube daily  sodium chloride 0.9%. 1000 milliLiter(s) IV Continuous <Continuous>  warfarin 7.5 milliGRAM(s) Oral once      Objective:  Vital Signs Last 24 Hrs  T(C): 36.6 (09 Jul 2021 16:00), Max: 36.7 (08 Jul 2021 20:00)  T(F): 97.8 (09 Jul 2021 16:00), Max: 98.1 (09 Jul 2021 12:00)  HR: 104 (09 Jul 2021 18:00) (68 - 135)  BP: --  BP(mean): --  RR: 30 (09 Jul 2021 18:00) (20 - 38)  SpO2: 96% (09 Jul 2021 18:00) (94% - 100%)    PHYSICAL EXAM:  Constitutional:no acute distress  Eyes:ALONSO, EOMI  Ear/Nose/Throat: trach  trach collar for weaning trials	  Respiratory: coarse audible bilateral  Cardiovascular: LVAD sounds  Gastrointestinal:soft,   LVAD exit site dressing CDI  Extremities:no e/e/c  No Lymphadenopathy  IV sites not inflammed.    LABS:                        10.0   9.82  )-----------( 276      ( 09 Jul 2021 00:42 )             33.6     07-09    133<L>  |  94<L>  |  16  ----------------------------<  130<H>  4.1   |  27  |  0.69    Ca    10.4      09 Jul 2021 00:42  Phos  4.4     07-09  Mg     2.0     07-09    TPro  9.1<H>  /  Alb  4.4  /  TBili  0.4  /  DBili  x   /  AST  18  /  ALT  6<L>  /  AlkPhos  110  07-09    PT/INR - ( 09 Jul 2021 12:16 )   PT: 15.1 sec;   INR: 1.27 ratio         PTT - ( 09 Jul 2021 12:16 )  PTT:58.2 sec      MICROBIOLOGY:            RECENT CULTURES:  07-07 @ 11:47  .Sputum Sputum  Citrobacter freundii  Serratia marcescens  Citrobacter freundii  CLAU    Moderate Citrobacter freundii  Moderate Serratia marcescens  Normal Respiratory Bria present  --      RADIOLOGY & ADDITIONAL STUDIES:    < from: Xray Abdomen 1 View PORTABLE -Routine (Xray Abdomen 1 View PORTABLE -Routine .) (07.09.21 @ 11:08) >  IMPRESSION:  Nondilated air-filled loops of bowel.    < end of copied text >  < from: Xray Chest 1 View- PORTABLE-Routine (Xray Chest 1 View- PORTABLE-Routine in AM.) (07.09.21 @ 02:10) >  The heart is enlarged. The lungs appear to be clear. No pleural effusion. No pneumothorax. All life supporting devices are in good position and unchanged when compared to previous study done on July 8, 2021 at  3:18 AM. This poststernotomy.    < end of copied text >

## 2021-07-09 NOTE — PROGRESS NOTE ADULT - SUBJECTIVE AND OBJECTIVE BOX
Subjective:  No overnight events    Medications:  acetaminophen    Suspension .. 650 milliGRAM(s) Oral every 6 hours PRN  aMIOdarone    Tablet 200 milliGRAM(s) Oral daily  chlorhexidine 0.12% Liquid 15 milliLiter(s) Oral Mucosa every 12 hours  chlorhexidine 2% Cloths 1 Application(s) Topical <User Schedule>  clonazePAM  Tablet 0.5 milliGRAM(s) Oral every 8 hours  dexMEDEtomidine Infusion 0.4 MICROgram(s)/kG/Hr IV Continuous <Continuous>  furosemide    Tablet 40 milliGRAM(s) Oral daily  heparin  Infusion 400 Unit(s)/Hr IV Continuous <Continuous>  hydrALAZINE 10 milliGRAM(s) Oral every 8 hours PRN  insulin lispro (ADMELOG) corrective regimen sliding scale   SubCutaneous every 6 hours  metoclopramide Injectable 10 milliGRAM(s) IV Push every 8 hours  metoprolol tartrate 25 milliGRAM(s) Oral every 12 hours  mirtazapine 7.5 milliGRAM(s) Oral at bedtime  pantoprazole   Suspension 40 milliGRAM(s) Enteral Tube daily  polyethylene glycol 3350 17 Gram(s) Oral daily  sodium chloride 0.9%. 1000 milliLiter(s) IV Continuous <Continuous>  vancomycin    Solution 500 milliGRAM(s) Oral every 6 hours  warfarin 7.5 milliGRAM(s) Oral once    Vitals:  T(C): 36.7 (07-09-21 @ 12:00), Max: 36.7 (07-08-21 @ 20:00)  HR: 99 (07-09-21 @ 14:00) (65 - 135)  BP: --  BP(mean): --  RR: 30 (07-09-21 @ 14:00) (17 - 38)  SpO2: 100% (07-09-21 @ 14:00) (94% - 100%)    Daily     Daily         I&O's Summary    08 Jul 2021 07:01  -  09 Jul 2021 07:00  --------------------------------------------------------  IN: 2124.6 mL / OUT: 1935 mL / NET: 189.6 mL    09 Jul 2021 07:01 - 09 Jul 2021 14:19  --------------------------------------------------------  IN: 73.5 mL / OUT: 300 mL / NET: -226.5 mL        Physical Exam:  Appearance: No Acute Distress  HEENT: s/p trach, JVP 8 cm H20  Cardiovascular: typical VAD hums   Respiratory: scattered rhonchi  Gastrointestinal: Soft, mildly tender, non-distended	  Skin: no skin lesions  Neurologic: Non-focal  Extremities: No LE edema, warm and well perfused  Psychiatry: blunted affect     LVAD interrogation   Flow: 5.0  Speed: 9200  PI: 5.5  Power: 6.8   Alarms: none  Changes to device programming: none         Labs:                        10.0   9.82  )-----------( 276      ( 09 Jul 2021 00:42 )             33.6     07-09    133<L>  |  94<L>  |  16  ----------------------------<  130<H>  4.1   |  27  |  0.69    Ca    10.4      09 Jul 2021 00:42  Phos  4.4     07-09  Mg     2.0     07-09    TPro  9.1<H>  /  Alb  4.4  /  TBili  0.4  /  DBili  x   /  AST  18  /  ALT  6<L>  /  AlkPhos  110  07-09      PT/INR - ( 09 Jul 2021 12:16 )   PT: 15.1 sec;   INR: 1.27 ratio         PTT - ( 09 Jul 2021 12:16 )  PTT:58.2 sec          Lactate Dehydrogenase, Serum: 296 U/L (07-09 @ 00:42)  Lactate Dehydrogenase, Serum: 284 U/L (07-07 @ 00:46)

## 2021-07-10 NOTE — PROGRESS NOTE ADULT - SUBJECTIVE AND OBJECTIVE BOX
RICKY JOINT  MRN#: [id number]  Subjective:  Pulmonary progress  : recurrent Acute hypoxic respiratory Failure ,aspiration pneumonia, NICM  , chart reviewed and H/O obtained radiological and Laboratory study reviewed patient Examined     65M PMH ACC/AHA stage D HF due to NICM HM2 LVAD , TV annuloplasty ring 17 as destination therapy due to severe peripheral artery disease with significant stenosis  SIADH, Depression, CKD-3 with hyperkalemia, past E. coli UTIs, driveline drainage (21) and COVID-19 (back in 2020)  He was recently seen in clinic where he complained of abdominal pain and dark stools w constipation back in May. He presents to [facility] ER today weakness and fatigue, moderate and + Black stools for three days, on coumadin secondary to warfarin use in the setting of an LVAD. Patient has required transfusions for GIB in the past. Mostly recently back in 2021 pt had anemia with dark stools. No interventions was done at that time. However Last Endoscopy was done in 2020 (negative). Today labs show patient is anemic with H/H of 4.5/16.3,. INR is 8.84 MAP in the 90s, Temp 35.1. He denies any chest pain, shortness of breath, dizziness, abd pain, nausea or vomiting. found to have  rectal bleeding underwent endoscopy ,old blood in the proximal ileum ,  develop sepsis with LL opacity given Antibiotics , Extubated , reintubated , Bronchoscopy on Zosyn for LL pneumonia  and Amiodarone S/P TV Annuloplasty , patient remain intubated on full ventilatory support .S/P multiple units of blood transfusion , remain on full ventilatory support on Precedex and propofol , new central IJ line , diarrhea C diff. +ve on po vancomycin and IV Flagyl,  mildly distended belly , fever start on cefepime 2gm q 8 hrs S/P tracheostomy .  new RT Subclavian central line continue on contact  isolation ,still diarrhea on C-diff antibiotics ENT follow up appreciated , trial of C-PAP as tolerated , , copious secretion from trach. site chest x ray left lower lobe pneumonia , tolerating trch. collar 50% FI02 still excessive secretion need pulmonary toilet , on Ancef antibiotic , no more diarrhea back on full support mechanical ventilator , chest x ray show improvement in LLL air space disease, more awake and responsive on tube feeding no more diarrhea , afebrile on Ancef for bacteremia no fever ,ID follow up noted , recurrent diarrhea from laxative subside no nausea or vomiting          (2021 16:57)    PAST MEDICAL & SURGICAL HISTORY:  CHF (congestive heart failure)    CAD (coronary artery disease)  Depression    Pleural effusion    History of 2019 novel coronavirus disease (COVID-19)  2020    Hemorrhoids    Bleeding hemorrhoids    Peripheral arterial disease    Claudication    BPH with urinary obstruction    ACC/AHA stage D systolic heart failure  Anticoagulation goal of INR 2.0 to 2.5    Falls    Clavicle fracture    CKD (chronic kidney disease), stage III    Iron deficiency anemia    H/O epistaxis    Vertigo    GI bleed    S/P TVR (tricuspid valve repair)    S/P ventricular assist device    S/P endoscopy    OBJECTIVE:  ICU Vital Signs Last 24 Hrs  T(C): 38.2 (2021 10:00), Max: 38.5 (2021 12:00)  T(F): 100.8 (2021 10:00), Max: 101.3 (2021 12:00)  HR: 65 (2021 10:00) (61 - 69)  BP: --  BP(mean): --  ABP: 105/67 (2021 10:00) (90/54 - 113/64)  ABP(mean): 77 (2021 10:00) (63 - 77)  RR: 20 (2021 10:00) (19 - 35)  SpO2: 99% (2021 10:00) (96% - 100%)       @ :  -   @ 07:00  --------------------------------------------------------  IN: 2693.9 mL / OUT: 1415 mL / NET: 1278.9 mL     @ 07:  -   @ 10:49  --------------------------------------------------------  IN: 420.8 mL / OUT: 115 mL / NET: 305.8 mL  PHYSICAL EXAM:Daily   Elderly male S/P tracheostomy   on full ventilatory support /40%20/5cm PEEP  0n vasopressin , alternating with trach. collar on IV heparin   Daily Weight in k.4 (2021 00:00)  HEENT:     + NCAT  + EOMI  - Conjuctival edema   - Icterus   - Thrush   - ETT  + NGT/OGT  Neck:         + FROM   RT SC line  JVD     - Nodes     - Masses    + Mid-line trachea + Tracheostomy  Chest: normal findings  Lungs:          + CTA   + Rhonchi    - Rales    - Wheezing + Decreased  LT BS   - Dullness R L  Cardiac:       + S1 + S2    + RRR   - Irregular   - S3  - S4    - Murmurs   - Rub   - Hamman’s sign   Abdomen:    + BS     + Soft    + Non-tender     - Distended    - Organomegaly  - PEG  Extremities:   - Cyanosis U/L   - Clubbing  U/L  + LE/UE Edema   + Capillary refill    + Pulses   Neuro:        - Awake   -  Alert   - Confused   - Lethargic   + Sedated  + Generalized Weakness  Skin:        - Rashes    - Erythema   + Normal incisions   + IV sites intact          HOSPITAL MEDICATIONS: All mediciations reviewed and analyzed  MEDICATIONS  (STANDING):  aMIOdarone    Tablet 200 milliGRAM(s) Oral daily  chlorhexidine 0.12% Liquid 15 milliLiter(s) Oral Mucosa every 12 hours  chlorhexidine 2% Cloths 1 Application(s) Topical <User Schedule>  dexMEDEtomidine Infusion 0.5 MICROgram(s)/kG/Hr (9.81 mL/Hr) IV Continuous <Continuous>  dextrose 50% Injectable 50 milliLiter(s) IV Push every 15 minutes  heparin  Infusion 400 Unit(s)/Hr (12.5 mL/Hr) IV Continuous <Continuous>  HYDROmorphone  Injectable 0.5 milliGRAM(s) IV Push once  insulin lispro (ADMELOG) corrective regimen sliding scale   SubCutaneous every 6 hours  pantoprazole  Injectable 40 milliGRAM(s) IV Push every 12 hours  piperacillin/tazobactam IVPB.. 3.375 Gram(s) IV Intermittent every 8 hours  propofol Infusion 20 MICROgram(s)/kG/Min (9.42 mL/Hr) IV Continuous <Continuous>  sodium chloride 0.9% lock flush 3 milliLiter(s) IV Push every 8 hours  sodium chloride 0.9%. 1000 milliLiter(s) (10 mL/Hr) IV Continuous <Continuous>    MEDICATIONS  (PRN):  acetaminophen    Suspension .. 650 milliGRAM(s) Oral every 6 hours PRN Temp greater or equal to 38C (100.4F)    LABS: All Lab data reviewed and analyzed                        11.2   14.12 )-----------( 325      ( 10 Jul 2021 00:20 )             37.5    07-10    129<L>  |  91<L>  |  18  ----------------------------<  130<H>  3.8   |  25  |  0.68    Ca    10.5      10 Jul 2021 00:20  Phos  3.5     07-10  Mg     2.0     07-10    TPro  9.8<H>  /  Alb  4.6  /  TBili  0.4  /  DBili  x   /  AST  19  /  ALT  8<L>  /  AlkPhos  119  07-10    Ca    10.4      2021 00:42  Phos  4.4     07-09  Mg     2.0     07-09    TPro  9.1<H>  /  Alb  4.4  /  TBili  0.4  /  DBili  x   /  AST  18  /  ALT  6<L>  /  AlkPhos  110  07-09    Ca    9.7      2021 01:31  Phos  3.3     07-08  Mg     2.0     07-08    TPro  8.6<H>  /  Alb  4.0  /  TBili  0.4  /  DBili  x   /  AST  18  /  ALT  5<L>  /  AlkPhos  105  07-08                           PTT - ( 2021 04:52 )  PTT:45.2 sec LIVER FUNCTIONS - ( 2021 00:42 )  Alb: 3.4 g/dL / Pro: 6.7 g/dL / ALK PHOS: 213 U/L / ALT: 15 U/L / AST: 24 U/L / GGT: x           RADIOLOGY: - Reviewed and analyzed LLL  pneumonia , LVAD HM2, CT scan of abdomen reviewed result noted

## 2021-07-10 NOTE — PROGRESS NOTE ADULT - ASSESSMENT
Assessment and Recommendation:   · Assessment	  Assessment and recommendation :  Recurrent Acute hypoxic respiratory Failure S/P tracheostomy on full ventilatory support alternating with trach. collar at 50% Fi02  Acute left lower lobe pneumonia  possible Aspiration pneumonia   Stool is  +ve for C-diff. colitis on PO vancomycin  improved   considered D/C contact isolation    on Ancef improved for bacteremia   Non ischemic cardiomyopathy continue ACE inhibitor and B-Blockers   S/P Septic shock and cardiogenic shock   Stage D systolic heart failure S/P LVAD HM2   MH2 LVAD  with  TV Annuloplasty  on IV Argatroban for LVAD   Severe peripheral vascular disease   S/P Anion gap metabolic acidosis  severe hyperglycemia on insulin coverage    On  Amiodarone and IV heparin   critical care polyneuropathy   Anemia of Acute blood Loss   S/P Small bowel bleeding   S/P blood and FFP transfusion   Chronic kidney disease stage III  Continue NGT feeding   GI prophylaxis  discuss with TCV surgeon

## 2021-07-11 NOTE — PROGRESS NOTE ADULT - ASSESSMENT
Assessment and Recommendation:   · Assessment	  Assessment and recommendation :  Recurrent Acute hypoxic respiratory Failure S/P tracheostomy on full ventilatory support alternating with trach. collar at 50% Fi02  Acute left lower lobe pneumonia  possible Aspiration pneumonia   Stool is  +ve for C-diff. colitis on PO vancomycin  improved   considered D/C contact isolation   S/P  Ancef improved for bacteremia   Non ischemic cardiomyopathy continue ACE inhibitor and B-Blockers   S/P Septic shock and cardiogenic shock   Stage D systolic heart failure S/P LVAD HM2   MH2 LVAD  with  TV Annuloplasty  Severe peripheral vascular disease   S/P Anion gap metabolic acidosis  severe hyperglycemia on insulin coverage    On  Amiodarone and IV heparin   critical care polyneuropathy   Anemia of Acute blood Loss   S/P Small bowel bleeding   S/P blood and FFP transfusion   Chronic kidney disease stage III  Continue NGT feeding   GI prophylaxis  discuss with TCV surgeon

## 2021-07-11 NOTE — PROGRESS NOTE ADULT - SUBJECTIVE AND OBJECTIVE BOX
CRITICAL CARE ATTENDING - CTICU    MEDICATIONS  (STANDING):  aMIOdarone    Tablet 200 milliGRAM(s) Oral daily  chlorhexidine 0.12% Liquid 15 milliLiter(s) Oral Mucosa every 12 hours  chlorhexidine 2% Cloths 1 Application(s) Topical <User Schedule>  clonazePAM  Tablet 0.5 milliGRAM(s) Oral every 8 hours  furosemide    Tablet 40 milliGRAM(s) Oral daily  heparin  Infusion 1000 Unit(s)/Hr (10 mL/Hr) IV Continuous <Continuous>  insulin lispro (ADMELOG) corrective regimen sliding scale   SubCutaneous every 6 hours  metoprolol tartrate 25 milliGRAM(s) Oral every 12 hours  mirtazapine 7.5 milliGRAM(s) Oral at bedtime  pantoprazole   Suspension 40 milliGRAM(s) Enteral Tube daily  vancomycin    Solution 500 milliGRAM(s) Oral every 6 hours                                    10.9   12.08 )-----------( 251      ( 2021 00:29 )             35.9       07-11    131<L>  |  93<L>  |  22  ----------------------------<  136<H>  4.3   |  25  |  0.74    Ca    10.5      2021 00:29  Phos  3.8     07-11  Mg     2.0     -11    TPro  9.4<H>  /  Alb  4.2  /  TBili  0.4  /  DBili  x   /  AST  19  /  ALT  10  /  AlkPhos  110  07-11      PT/INR - ( 10 Jul 2021 14:38 )   PT: 17.4 sec;   INR: 1.48 ratio         PTT - ( 2021 02:07 )  PTT:57.7 sec    Mode: off      Daily     Daily Weight in k.2 (2021 00:00)      07-10 @ 07:01  -  -11 @ 07:00  --------------------------------------------------------  IN: 2030 mL / OUT: 1290 mL / NET: 740 mL          Critically Ill patient  : [ ] preoperative ,   [] post operative   [x] Non Operative     Requires :  [x] Arterial Line   [ ] Central Line  [ ] PA catheter  [ ] IABP  [ ] ECMO  [x] LVAD  [x] Ventilator  [ ] pacemaker [ ] Impella.                      [x] ABG's     [x] Pulse Oxymetry Monitoring  Bedside evaluation , monitoring , treatment of hemodynamics , fluids , IVP/ IVCD meds.        Diagnosis:     Admitted to CTU on  for Melena with anemia     POD  - Tracheostomy     Resolving L Pneumonia / ARDS     Sepsis - L pneumonia/ pneumonitis - Resolved     CHF- acute [x]   chronic [x]    systolic [x]   diatolic [ ]          - Echo- EF -  BiV           [x] RV dysfunction          - Cxr-cardiomegally, edema          - Clinical-  [ ]inotropes   [ ]pressors   [x]diuresis   [ ]IABP   [ ]ECMO   [x]LVAD   [x]Respiratory Failure.     Respiratory Failure     LVAD Circuit    Ventilator Management:  [ x]AC-rest    [ x]CPAP-PS Wean    [x ]Trach Collar     [ ]Extubate    [ ] T-Piece  [ ]peep>5     Hypovolemia     Chronic Kidney Disease     IVCD anticoagulation with [x ] Heparin  [ ] Argatroban for LVAD Circuit     Difficult weaning process - multiple organ system involvement in critically ill patient    C Diff positive     DMT2 - Sliding Scale     Hyponatremia     Requires chest PT, pulmonary toilet, ambu bagging, suctioning to maintain SaO2,  patent airway and treat atelectasis.    Requires bedside physical therapy, mobilization and total FPC care.    Tolerates NG / NJ feeds at [x] goal rate    [ ] trophic rate    [ ]       rate                     By signing my name below, I, Agnieszka Cherry, attest that this documentation has been prepared under the direction and in the presence of Luis Fernando Thompson MD.   Electronically Signed: Cesia Shaffer 21 @ 07:32      Discussed with CT surgeon, Physician Assistant - Nurse Practitioner- Critical care medicine team.   Discussed at  AM / PM rounds.   Chart, labs , films reviewed.    Cumulative Critical Care Time Given Today:  CRITICAL CARE ATTENDING - CTICU    MEDICATIONS  (STANDING):  aMIOdarone    Tablet 200 milliGRAM(s) Oral daily  chlorhexidine 0.12% Liquid 15 milliLiter(s) Oral Mucosa every 12 hours  chlorhexidine 2% Cloths 1 Application(s) Topical <User Schedule>  clonazePAM  Tablet 0.5 milliGRAM(s) Oral every 8 hours  furosemide    Tablet 40 milliGRAM(s) Oral daily  heparin  Infusion 1000 Unit(s)/Hr (10 mL/Hr) IV Continuous <Continuous>  insulin lispro (ADMELOG) corrective regimen sliding scale   SubCutaneous every 6 hours  metoprolol tartrate 25 milliGRAM(s) Oral every 12 hours  mirtazapine 7.5 milliGRAM(s) Oral at bedtime  pantoprazole   Suspension 40 milliGRAM(s) Enteral Tube daily  vancomycin    Solution 500 milliGRAM(s) Oral every 6 hours                                    10.9   12.08 )-----------( 251      ( 2021 00:29 )             35.9       07-11    131<L>  |  93<L>  |  22  ----------------------------<  136<H>  4.3   |  25  |  0.74    Ca    10.5      2021 00:29  Phos  3.8     07-11  Mg     2.0     -11    TPro  9.4<H>  /  Alb  4.2  /  TBili  0.4  /  DBili  x   /  AST  19  /  ALT  10  /  AlkPhos  110  07-11      PT/INR - ( 10 Jul 2021 14:38 )   PT: 17.4 sec;   INR: 1.48 ratio         PTT - ( 2021 02:07 )  PTT:57.7 sec    Mode: off      Daily     Daily Weight in k.2 (2021 00:00)      07-10 @ 07:01  -  -11 @ 07:00  --------------------------------------------------------  IN: 2030 mL / OUT: 1290 mL / NET: 740 mL          Critically Ill patient  : [ ] preoperative ,   [] post operative   [x] Non Operative     Requires :  [x] Arterial Line   [ ] Central Line  [ ] PA catheter  [ ] IABP  [ ] ECMO  [x] LVAD  [x] Ventilator  [ ] pacemaker [ ] Impella.                      [x] ABG's     [x] Pulse Oxymetry Monitoring  Bedside evaluation , monitoring , treatment of hemodynamics , fluids , IVP/ IVCD meds.        Diagnosis:     Admitted to CTU on  for Melena with anemia     POD  - Tracheostomy     Resolving L Pneumonia / ARDS     Sepsis - L pneumonia/ pneumonitis - Resolved     CHF- acute [x]   chronic [x]    systolic [x]   diatolic [ ]          - Echo- EF -  BiV           [x] RV dysfunction          - Cxr-cardiomegally, edema          - Clinical-  [ ]inotropes   [ ]pressors   [x]diuresis   [ ]IABP   [ ]ECMO   [x]LVAD   [x]Respiratory Failure.     Respiratory Failure     LVAD Circuit    Ventilator Management:  [ x]AC-rest    [ x]CPAP-PS Wean    [x ]Trach Collar     [ ]Extubate    [ ] T-Piece  [ ]peep>5     Hypovolemia     Chronic Kidney Disease     IVCD anticoagulation with [x ] Heparin  [ ] Argatroban for LVAD Circuit     Difficult weaning process - multiple organ system involvement in critically ill patient    C Diff positive     DMT2 - Sliding Scale     Hyponatremia     Requires chest PT, pulmonary toilet, ambu bagging, suctioning to maintain SaO2,  patent airway and treat atelectasis.    Requires bedside physical therapy, mobilization and total snf care.    Tolerates NG / NJ feeds at [x] goal rate    [ ] trophic rate    [ ]       rate                     By signing my name below, I, Agnieszka Cherry, attest that this documentation has been prepared under the direction and in the presence of Luis Fernando Thompson MD.   Electronically Signed: Cesia Shaffer 21 @ 07:32    ILuis Fernando, personally performed the services described in this documentation. All medical record entries made by the scribe were at my direction and in my presence. I have reviewed the chart and agree that the record reflects my personal performance and is accurate and complete.   Luis Fernando Thompson MD.       Discussed with CT surgeon, Physician Assistant - Nurse Practitioner- Critical care medicine team.   Discussed at  AM / PM rounds.   Chart, labs , films reviewed.    Cumulative Critical Care Time Given Today:  30 min

## 2021-07-11 NOTE — PROGRESS NOTE ADULT - SUBJECTIVE AND OBJECTIVE BOX
RICKY JOINT  MRN#: [id number]  Subjective:  Pulmonary progress  : recurrent Acute hypoxic respiratory Failure ,aspiration pneumonia, NICM  , chart reviewed and H/O obtained radiological and Laboratory study reviewed patient Examined     65M PMH ACC/AHA stage D HF due to NICM HM2 LVAD , TV annuloplasty ring 17 as destination therapy due to severe peripheral artery disease with significant stenosis  SIADH, Depression, CKD-3 with hyperkalemia, past E. coli UTIs, driveline drainage (21) and COVID-19 (back in 2020)  He was recently seen in clinic where he complained of abdominal pain and dark stools w constipation back in May. He presents to [facility] ER today weakness and fatigue, moderate and + Black stools for three days, on coumadin secondary to warfarin use in the setting of an LVAD. Patient has required transfusions for GIB in the past. Mostly recently back in 2021 pt had anemia with dark stools. No interventions was done at that time. However Last Endoscopy was done in 2020 (negative). Today labs show patient is anemic with H/H of 4.5/16.3,. INR is 8.84 MAP in the 90s, Temp 35.1. He denies any chest pain, shortness of breath, dizziness, abd pain, nausea or vomiting. found to have  rectal bleeding underwent endoscopy ,old blood in the proximal ileum ,  develop sepsis with LL opacity given Antibiotics , Extubated , reintubated , Bronchoscopy on Zosyn for LL pneumonia  and Amiodarone S/P TV Annuloplasty , patient remain intubated on full ventilatory support .S/P multiple units of blood transfusion , remain on full ventilatory support on Precedex and propofol , new central IJ line , diarrhea C diff. +ve on po vancomycin and IV Flagyl,  mildly distended belly , fever start on cefepime 2gm q 8 hrs S/P tracheostomy .  new RT Subclavian central line continue on contact  isolation ,still diarrhea on C-diff antibiotics ENT follow up appreciated , trial of C-PAP as tolerated , , copious secretion from trach. site chest x ray left lower lobe pneumonia , tolerating trch. collar 50% FI02 still excessive secretion need pulmonary toilet , off Ancef antibiotic , no more diarrhea back on full support mechanical ventilator , chest x ray show improvement in LLL air space disease, more awake and responsive on tube feeding no more diarrhea , afebrile on Ancef for bacteremia no fever ,ID follow up noted , recurrent diarrhea from laxative subside no nausea or vomiting          (2021 16:57)    PAST MEDICAL & SURGICAL HISTORY:  CHF (congestive heart failure)    CAD (coronary artery disease)  Depression    Pleural effusion    History of 2019 novel coronavirus disease (COVID-19)  2020    Hemorrhoids    Bleeding hemorrhoids    Peripheral arterial disease    Claudication    BPH with urinary obstruction    ACC/AHA stage D systolic heart failure  Anticoagulation goal of INR 2.0 to 2.5    Falls    Clavicle fracture    CKD (chronic kidney disease), stage III    Iron deficiency anemia    H/O epistaxis    Vertigo    GI bleed    S/P TVR (tricuspid valve repair)    S/P ventricular assist device    S/P endoscopy    OBJECTIVE:  ICU Vital Signs Last 24 Hrs  T(C): 38.2 (2021 10:00), Max: 38.5 (2021 12:00)  T(F): 100.8 (2021 10:00), Max: 101.3 (2021 12:00)  HR: 65 (2021 10:00) (61 - 69)  BP: --  BP(mean): --  ABP: 105/67 (2021 10:00) (90/54 - 113/64)  ABP(mean): 77 (2021 10:00) (63 - 77)  RR: 20 (2021 10:00) (19 - 35)  SpO2: 99% (2021 10:00) (96% - 100%)       @ :  -   @ 07:00  --------------------------------------------------------  IN: 2693.9 mL / OUT: 1415 mL / NET: 1278.9 mL     @ 07:  -   @ 10:49  --------------------------------------------------------  IN: 420.8 mL / OUT: 115 mL / NET: 305.8 mL  PHYSICAL EXAM:Daily   Elderly male S/P tracheostomy   on full ventilatory support /40%20/5cm PEEP  0n vasopressin , alternating with trach. collar on IV heparin   Daily Weight in k.4 (2021 00:00)  HEENT:     + NCAT  + EOMI  - Conjuctival edema   - Icterus   - Thrush   - ETT  + NGT/OGT  Neck:         + FROM   RT SC line  JVD     - Nodes     - Masses    + Mid-line trachea + Tracheostomy  Chest: normal findings  Lungs:          + CTA   + Rhonchi    - Rales    - Wheezing + Decreased  LT BS   - Dullness R L  Cardiac:       + S1 + S2    + RRR   - Irregular   - S3  - S4    - Murmurs   - Rub   - Hamman’s sign   Abdomen:    + BS     + Soft    + Non-tender     - Distended    - Organomegaly  - PEG  Extremities:   - Cyanosis U/L   - Clubbing  U/L  + LE/UE Edema   + Capillary refill    + Pulses   Neuro:        - Awake   -  Alert   - Confused   - Lethargic   + Sedated  + Generalized Weakness  Skin:        - Rashes    - Erythema   + Normal incisions   + IV sites intact          HOSPITAL MEDICATIONS: All mediciations reviewed and analyzed  MEDICATIONS  (STANDING):  aMIOdarone    Tablet 200 milliGRAM(s) Oral daily  chlorhexidine 0.12% Liquid 15 milliLiter(s) Oral Mucosa every 12 hours  chlorhexidine 2% Cloths 1 Application(s) Topical <User Schedule>  dexMEDEtomidine Infusion 0.5 MICROgram(s)/kG/Hr (9.81 mL/Hr) IV Continuous <Continuous>  dextrose 50% Injectable 50 milliLiter(s) IV Push every 15 minutes  heparin  Infusion 400 Unit(s)/Hr (12.5 mL/Hr) IV Continuous <Continuous>  HYDROmorphone  Injectable 0.5 milliGRAM(s) IV Push once  insulin lispro (ADMELOG) corrective regimen sliding scale   SubCutaneous every 6 hours  pantoprazole  Injectable 40 milliGRAM(s) IV Push every 12 hours  piperacillin/tazobactam IVPB.. 3.375 Gram(s) IV Intermittent every 8 hours  propofol Infusion 20 MICROgram(s)/kG/Min (9.42 mL/Hr) IV Continuous <Continuous>  sodium chloride 0.9% lock flush 3 milliLiter(s) IV Push every 8 hours  sodium chloride 0.9%. 1000 milliLiter(s) (10 mL/Hr) IV Continuous <Continuous>    MEDICATIONS  (PRN):  acetaminophen    Suspension .. 650 milliGRAM(s) Oral every 6 hours PRN Temp greater or equal to 38C (100.4F)    LABS: All Lab data reviewed and analyzed                                   10.9   08 )-----------( 251      ( 2021 00:29 )             35.9   07-11    131<L>  |  93<L>  |  22  ----------------------------<  136<H>  4.3   |  25  |  0.74    Ca    10.5      2021 00:29  Phos  3.8     07-11  Mg     2.0     07-11    TPro  9.4<H>  /  Alb  4.2  /  TBili  0.4  /  DBili  x   /  AST  19  /  ALT  10  /  AlkPhos  110  07-11    Ca    10.5      10 Jul 2021 00:20  Phos  3.5     07-10  Mg     2.0     07-10    TPro  9.8<H>  /  Alb  4.6  /  TBili  0.4  /  DBili  x   /  AST  19  /  ALT  8<L>  /  AlkPhos  119  07-10    Ca    10.4      2021 00:42  Phos  4.4     07-09  Mg     2.0     07-09    TPro  9.1<H>  /  Alb  4.4  /  TBili  0.4  /  DBili  x   /  AST  18  /  ALT  6<L>  /  AlkPhos  110  07-09    Ca    9.7      2021 01:31  Phos  3.3     07-08  Mg     2.0     07-08    TPro  8.6<H>  /  Alb  4.0  /  TBili  0.4  /  DBili  x   /  AST  18  /  ALT  5<L>  /  AlkPhos  105  07-08                           PTT - ( 2021 04:52 )  PTT:45.2 sec LIVER FUNCTIONS - ( 2021 00:42 )  Alb: 3.4 g/dL / Pro: 6.7 g/dL / ALK PHOS: 213 U/L / ALT: 15 U/L / AST: 24 U/L / GGT: x           RADIOLOGY: - Reviewed and analyzed LLL  pneumonia , LVAD HM2, CT scan of abdomen reviewed result noted

## 2021-07-12 NOTE — PROGRESS NOTE ADULT - SUBJECTIVE AND OBJECTIVE BOX
CRITICAL CARE ATTENDING - CTICU    MEDICATIONS  (STANDING):  aMIOdarone    Tablet 200 milliGRAM(s) Oral daily  chlorhexidine 2% Cloths 1 Application(s) Topical <User Schedule>  clonazePAM  Tablet 0.5 milliGRAM(s) Oral every 8 hours  furosemide    Tablet 40 milliGRAM(s) Oral daily  heparin  Infusion 1000 Unit(s)/Hr (10 mL/Hr) IV Continuous <Continuous>  insulin lispro (ADMELOG) corrective regimen sliding scale   SubCutaneous every 6 hours  metoprolol tartrate 25 milliGRAM(s) Oral every 12 hours  pantoprazole   Suspension 40 milliGRAM(s) Enteral Tube daily  vancomycin    Solution 500 milliGRAM(s) Oral every 6 hours                              11.0   10.90 )-----------( 272      ( 12 Jul 2021 00:50 )             37.1       07-12    132<L>  |  93<L>  |  26<H>  ----------------------------<  142<H>  4.2   |  26  |  0.81    Ca    10.2      12 Jul 2021 00:50  Phos  4.3     07-12  Mg     2.2     07-12    TPro  9.5<H>  /  Alb  4.3  /  TBili  0.3  /  DBili  x   /  AST  23  /  ALT  10  /  AlkPhos  115  07-12      PT/INR - ( 11 Jul 2021 16:22 )   PT: 18.7 sec;   INR: 1.59 ratio         PTT - ( 12 Jul 2021 00:50 )  PTT:52.8 sec    Mode: vent off      Daily     Daily       07-10 @ 07:01 - 07-11 @ 07:00  --------------------------------------------------------  IN: 2030 mL / OUT: 1290 mL / NET: 740 mL    07-11 @ 07:01 - 07-12 @ 06:47  --------------------------------------------------------  IN: 1820 mL / OUT: 900 mL / NET: 920 mL        Critically Ill patient  : [ ] preoperative ,   [ ] post operative [x] non operative    Requires :  [x] Arterial Line   [ ] Central Line  [ ] PA catheter  [ ] IABP  [ ] ECMO  [x] LVAD  [ ] Ventilator  [ ] pacemaker [ ] Impella.                      [x] ABG's     [x] Pulse Oxymetry Monitoring  Bedside evaluation , monitoring , treatment of hemodynamics , fluids , IVP/ IVCD meds.        Diagnosis:     Admitted to CTU on 6/14 for Melena with anemia     POD 6/25 - Tracheostomy     Resolving L Pneumonia / ARDS     Sepsis - L pneumonia/ pneumonitis - Resolved     CHF- acute [x]   chronic [x]    systolic [x]   diatolic [ ]          - Echo- EF -  BiV           [x] RV dysfunction          - Cxr-cardiomegally, edema          - Clinical-  [ ]inotropes   [ ]pressors   [x]diuresis   [ ]IABP   [ ]ECMO   [x]LVAD   [x]Respiratory Failure.     Respiratory Failure     LVAD Circuit    Ventilator Management:  [ ]AC-rest    [ ]CPAP-PS Wean    [x ]Trach Collar     [ ]Extubate    [ ] T-Piece  [ ]peep>5     Hypovolemia     Chronic Kidney Disease     IVCD anticoagulation with [x ] Heparin  [ ] Argatroban for LVAD Circuit     Difficult weaning process - multiple organ system involvement in critically ill patient    C Diff positive     DMT2 - Sliding Scale     Hyponatremia     Requires chest PT, pulmonary toilet, ambu bagging, suctioning to maintain SaO2,  patent airway and treat atelectasis.    Requires bedside physical therapy, mobilization and total care home care.    Tolerates NG / NJ feeds at [x] goal rate    [ ] trophic rate    [ ]       rate           ILuis Fernando, personally performed the services described in this documentation. All medical record entries made by the scribe were at my direction and in my presence. I have reviewed the chart and agree that the record reflects my personal performance and is accurate and complete.   Luis Fernando Thompson MD.       By signing my name below, I, Emily Roa, attest that this documentation has been prepared under the direction and in the presence of Luis Fernando Thompson MD.   Electronically Signed: Emily Roa Scribe 07-12-21 @ 06:47        Discussed with CT surgeon, Physician Assistant - Nurse Practitioner- Critical care medicine team.   Dicussed at  AM / PM rounds.   Chart, labs , films reviewed.    Cumulative Critical Care Time Given Today:  CRITICAL CARE ATTENDING - CTICU    MEDICATIONS  (STANDING):  aMIOdarone    Tablet 200 milliGRAM(s) Oral daily  chlorhexidine 2% Cloths 1 Application(s) Topical <User Schedule>  clonazePAM  Tablet 0.5 milliGRAM(s) Oral every 8 hours  furosemide    Tablet 40 milliGRAM(s) Oral daily  heparin  Infusion 1000 Unit(s)/Hr (10 mL/Hr) IV Continuous <Continuous>  insulin lispro (ADMELOG) corrective regimen sliding scale   SubCutaneous every 6 hours  metoprolol tartrate 25 milliGRAM(s) Oral every 12 hours  pantoprazole   Suspension 40 milliGRAM(s) Enteral Tube daily  vancomycin    Solution 500 milliGRAM(s) Oral every 6 hours                              11.0   10.90 )-----------( 272      ( 12 Jul 2021 00:50 )             37.1       07-12    132<L>  |  93<L>  |  26<H>  ----------------------------<  142<H>  4.2   |  26  |  0.81    Ca    10.2      12 Jul 2021 00:50  Phos  4.3     07-12  Mg     2.2     07-12    TPro  9.5<H>  /  Alb  4.3  /  TBili  0.3  /  DBili  x   /  AST  23  /  ALT  10  /  AlkPhos  115  07-12      PT/INR - ( 11 Jul 2021 16:22 )   PT: 18.7 sec;   INR: 1.59 ratio         PTT - ( 12 Jul 2021 00:50 )  PTT:52.8 sec    Mode: vent off      Daily     Daily       07-10 @ 07:01 - 07-11 @ 07:00  --------------------------------------------------------  IN: 2030 mL / OUT: 1290 mL / NET: 740 mL    07-11 @ 07:01 - 07-12 @ 06:47  --------------------------------------------------------  IN: 1820 mL / OUT: 900 mL / NET: 920 mL        Critically Ill patient  : [ ] preoperative ,   [ ] post operative    [x] non operative    Requires :  [x] Arterial Line   [ ] Central Line  [ ] PA catheter  [ ] IABP  [ ] ECMO  [x] LVAD  [ ] Ventilator  [ ] pacemaker [ ] Impella.                      [x] ABG's     [x] Pulse Oxymetry Monitoring  Bedside evaluation , monitoring , treatment of hemodynamics , fluids , IVP/ IVCD meds.        Diagnosis:     Admitted to CTU on 6/14 for Melena with anemia  /  LVAD Patient    POD 6/25 - Tracheostomy     Resolving L Pneumonia / ARDS     Sepsis - L pneumonia/ pneumonitis - Resolved     CHF- acute [x]   chronic [x]    systolic [x]   diatolic [ ]          - Echo- EF -  BiV           [x] RV dysfunction          - Cxr-cardiomegally, edema          - Clinical-  [ ]inotropes   [ ]pressors   [x]diuresis   [ ]IABP   [ ]ECMO   [x]LVAD   [x]Respiratory Failure.     Respiratory Failure     LVAD Circuit    Ventilator Management:  [x ]AC-rest    [x ]CPAP-PS Wean    [x ]Trach Collar     [ ]Extubate    [ ] T-Piece  [ ]peep>5     Hypovolemia     Chronic Kidney Disease     IVCD anticoagulation with [x ] Heparin  [ ] Argatroban for LVAD Circuit     Difficult weaning process - multiple organ system involvement in critically ill patient    C Diff positive     DMT2 - Sliding Scale     Hyponatremia     Requires chest PT, pulmonary toilet, ambu bagging, suctioning to maintain SaO2,  patent airway and treat atelectasis.    Requires bedside physical therapy, mobilization and total assisted care.    Tolerates NG / NJ feeds at [x] goal rate    [ ] trophic rate    [ ]       rate           I, Luis Fernando Thompson, personally performed the services described in this documentation. All medical record entries made by the luisibe were at my direction and in my presence. I have reviewed the chart and agree that the record reflects my personal performance and is accurate and complete.   Luis Fernando Thompson MD.       By signing my name below, I, Emily Roa, attest that this documentation has been prepared under the direction and in the presence of Luis Fernando Thompson MD.   Electronically Signed: Emily Roa Scribe 07-12-21 @ 06:47        Discussed with CT surgeon, Physician Assistant - Nurse Practitioner- Critical care medicine team.   Dicussed at  AM / PM rounds.   Chart, labs , films reviewed.    Cumulative Critical Care Time Given Today:  30 min

## 2021-07-12 NOTE — PROGRESS NOTE ADULT - PROBLEM SELECTOR PLAN 1
- tolerating metoprolol succinate 25 mg bid. Will reintroduce home lisinopril as tolerates  - Continue lasix 40 mg PO daily  - Continue home amiodarone 200mg PO daily. - tolerating metoprolol tartrate 25 bid, transition to toprol when tolerating PO  - start losartan 12.5mg today   - Continue lasix 40 mg PO daily  - Continue home amiodarone 200mg PO daily.

## 2021-07-12 NOTE — PROGRESS NOTE ADULT - ASSESSMENT
imp/rx:  Suspected aspiration event at time of Endoscopic procedure with subsequent development of C.diff diarrhea  Now with increasing leukocytosis and gram + cocci in clusters from blood culture sent 6/26 with organism ID/sensitivity pending  Started IV Vancomycin  Vanco CLAU=4 is borderline  Will change antibiotics to Cefazolin  Would change CVC lines as feasible   repeat blood cultures 6/29 x2 sets are negative  completed therapy with Cefazolin        Clostridiodes difficile :  Abdomen somewhat distended- but soft- loose stool again noted  WBC within the normal range  Continue Vanco oral but reduce to tapering dose of 125mg bid  But diarrhea improved when feeds were  held making it likely that the enteral feeds are also contributing to the ongoing diarrhea    Trach secretions  colonized with mixed gram negative cornelia including citrobacter and serratia  If he decompensates or becomes febrile would restart antibiotics with Meropenem to cover the citrobacter and the serratia colonizing his sputum        Morris Prater MD  [phone]  After 5pm/weekends [phone]

## 2021-07-12 NOTE — PROGRESS NOTE ADULT - SUBJECTIVE AND OBJECTIVE BOX
Patient seen and examined at bedside.    Overnight Events: No acute events overnight, tolerating trach collar.     Review Of Systems: No chest pain, shortness of breath, or palpitations            Current Meds:  acetaminophen    Suspension .. 650 milliGRAM(s) Oral every 6 hours PRN  aMIOdarone    Tablet 200 milliGRAM(s) Oral daily  chlorhexidine 2% Cloths 1 Application(s) Topical <User Schedule>  clonazePAM  Tablet 0.5 milliGRAM(s) Oral every 8 hours  furosemide    Tablet 40 milliGRAM(s) Oral daily  heparin  Infusion 1000 Unit(s)/Hr IV Continuous <Continuous>  hydrALAZINE 10 milliGRAM(s) Oral every 8 hours PRN  insulin lispro (ADMELOG) corrective regimen sliding scale   SubCutaneous every 6 hours  metoprolol tartrate 25 milliGRAM(s) Oral every 12 hours  pantoprazole   Suspension 40 milliGRAM(s) Enteral Tube daily  vancomycin    Solution 500 milliGRAM(s) Oral every 6 hours      Vitals:  T(F): 98.4 (07-12), Max: 98.8 (07-11)  HR: 108 (07-12) (97 - 125)  BP: --  RR: 13 (07-12)  SpO2: 98% (07-12)  I&O's Summary    11 Jul 2021 07:01  -  12 Jul 2021 07:00  --------------------------------------------------------  IN: 1820 mL / OUT: 900 mL / NET: 920 mL        Physical Exam:  Appearance: No acute distress; well appearing  Cardiovascular: RRR, S1, S2, no murmurs, rubs, or gallops; no edema; no JVD  Respiratory: Clear to auscultation bilaterally  Gastrointestinal: soft, non-tender, non-distended with normal bowel sounds  Musculoskeletal: No clubbing; no joint deformity   Neurologic: Non-focal  Lymphatic: No lymphadenopathy  Psychiatry: AAOx3, mood & affect appropriate  Skin: No rashes, ecchymoses, or cyanosis                          11.0   10.90 )-----------( 272      ( 12 Jul 2021 00:50 )             37.1     07-12    132<L>  |  93<L>  |  26<H>  ----------------------------<  142<H>  4.2   |  26  |  0.81    Ca    10.2      12 Jul 2021 00:50  Phos  4.3     07-12  Mg     2.2     07-12    TPro  9.5<H>  /  Alb  4.3  /  TBili  0.3  /  DBili  x   /  AST  23  /  ALT  10  /  AlkPhos  115  07-12    PT/INR - ( 11 Jul 2021 16:22 )   PT: 18.7 sec;   INR: 1.59 ratio         PTT - ( 12 Jul 2021 00:50 )  PTT:52.8 sec

## 2021-07-12 NOTE — PROGRESS NOTE ADULT - SUBJECTIVE AND OBJECTIVE BOX
RICKY JOINT  MRN#: [id number]  Subjective:  Pulmonary progress  : recurrent Acute hypoxic respiratory Failure ,aspiration pneumonia, NICM  , chart reviewed and H/O obtained radiological and Laboratory study reviewed patient Examined     65M PMH ACC/AHA stage D HF due to NICM HM2 LVAD , TV annuloplasty ring 17 as destination therapy due to severe peripheral artery disease with significant stenosis  SIADH, Depression, CKD-3 with hyperkalemia, past E. coli UTIs, driveline drainage (21) and COVID-19 (back in 2020)  He was recently seen in clinic where he complained of abdominal pain and dark stools w constipation back in May. He presents to [facility] ER today weakness and fatigue, moderate and + Black stools for three days, on coumadin secondary to warfarin use in the setting of an LVAD. Patient has required transfusions for GIB in the past. Mostly recently back in 2021 pt had anemia with dark stools. No interventions was done at that time. However Last Endoscopy was done in 2020 (negative). Today labs show patient is anemic with H/H of 4.5/16.3,. INR is 8.84 MAP in the 90s, Temp 35.1. He denies any chest pain, shortness of breath, dizziness, abd pain, nausea or vomiting. found to have  rectal bleeding underwent endoscopy ,old blood in the proximal ileum ,  develop sepsis with LL opacity given Antibiotics , Extubated , reintubated , Bronchoscopy on Zosyn for LL pneumonia  and Amiodarone S/P TV Annuloplasty , patient remain intubated on full ventilatory support .S/P multiple units of blood transfusion , remain on full ventilatory support on Precedex and propofol , new central IJ line , diarrhea C diff. +ve on po vancomycin and IV Flagyl,  mildly distended belly , fever start on cefepime 2gm q 8 hrs S/P tracheostomy .  new RT Subclavian central line continue on contact  isolation ,still diarrhea on C-diff antibiotics ENT follow up appreciated , trial of C-PAP as tolerated , , copious secretion from trach. site chest x ray left lower lobe pneumonia , tolerating trch. collar 50% FI02 still excessive secretion need pulmonary toilet , off Ancef antibiotic , no more diarrhea back on full support mechanical ventilator , chest x ray show improvement in LLL air space disease, more awake and responsive on tube feeding no more diarrhea , afebrile on Ancef for bacteremia no fever ,ID follow up noted ,  no nausea or vomiting or diarrhea          (2021 16:57)    PAST MEDICAL & SURGICAL HISTORY:  CHF (congestive heart failure)    CAD (coronary artery disease)  Depression    Pleural effusion    History of 2019 novel coronavirus disease (COVID-19)  2020    Hemorrhoids    Bleeding hemorrhoids    Peripheral arterial disease    Claudication    BPH with urinary obstruction    ACC/AHA stage D systolic heart failure  Anticoagulation goal of INR 2.0 to 2.5    Falls    Clavicle fracture    CKD (chronic kidney disease), stage III    Iron deficiency anemia    H/O epistaxis    Vertigo    GI bleed    S/P TVR (tricuspid valve repair)    S/P ventricular assist device    S/P endoscopy    OBJECTIVE:  ICU Vital Signs Last 24 Hrs  T(C): 38.2 (2021 10:00), Max: 38.5 (2021 12:00)  T(F): 100.8 (2021 10:00), Max: 101.3 (2021 12:00)  HR: 65 (2021 10:00) (61 - 69)  BP: --  BP(mean): --  ABP: 105/67 (2021 10:00) (90/54 - 113/64)  ABP(mean): 77 (2021 10:00) (63 - 77)  RR: 20 (2021 10:00) (19 - 35)  SpO2: 99% (2021 10:00) (96% - 100%)       @ :  -   @ 07:00  --------------------------------------------------------  IN: 2693.9 mL / OUT: 1415 mL / NET: 1278.9 mL     @ 07:01  -   @ 10:49  --------------------------------------------------------  IN: 420.8 mL / OUT: 115 mL / NET: 305.8 mL  PHYSICAL EXAM:Daily   Elderly male S/P tracheostomy   on full ventilatory support /40%20/5cm PEEP  0n vasopressin , alternating with trach. collar on IV heparin   Daily Weight in k.4 (2021 00:00)  HEENT:     + NCAT  + EOMI  - Conjuctival edema   - Icterus   - Thrush   - ETT  + NGT/OGT  Neck:         + FROM   RT SC line  JVD     - Nodes     - Masses    + Mid-line trachea + Tracheostomy  Chest: normal findings  Lungs:          + CTA   + Rhonchi    - Rales    - Wheezing + Decreased  LT BS   - Dullness R L  Cardiac:       + S1 + S2    + RRR   - Irregular   - S3  - S4    - Murmurs   - Rub   - Hamman’s sign   Abdomen:    + BS     + Soft    + Non-tender     - Distended    - Organomegaly  - PEG  Extremities:   - Cyanosis U/L   - Clubbing  U/L  + LE/UE Edema   + Capillary refill    + Pulses   Neuro:        - Awake   -  Alert   - Confused   - Lethargic   + Sedated  + Generalized Weakness  Skin:        - Rashes    - Erythema   + Normal incisions   + IV sites intact          HOSPITAL MEDICATIONS: All mediciations reviewed and analyzed  MEDICATIONS  (STANDING):  aMIOdarone    Tablet 200 milliGRAM(s) Oral daily  chlorhexidine 0.12% Liquid 15 milliLiter(s) Oral Mucosa every 12 hours  chlorhexidine 2% Cloths 1 Application(s) Topical <User Schedule>  dexMEDEtomidine Infusion 0.5 MICROgram(s)/kG/Hr (9.81 mL/Hr) IV Continuous <Continuous>  dextrose 50% Injectable 50 milliLiter(s) IV Push every 15 minutes  heparin  Infusion 400 Unit(s)/Hr (12.5 mL/Hr) IV Continuous <Continuous>  HYDROmorphone  Injectable 0.5 milliGRAM(s) IV Push once  insulin lispro (ADMELOG) corrective regimen sliding scale   SubCutaneous every 6 hours  pantoprazole  Injectable 40 milliGRAM(s) IV Push every 12 hours  piperacillin/tazobactam IVPB.. 3.375 Gram(s) IV Intermittent every 8 hours  propofol Infusion 20 MICROgram(s)/kG/Min (9.42 mL/Hr) IV Continuous <Continuous>  sodium chloride 0.9% lock flush 3 milliLiter(s) IV Push every 8 hours  sodium chloride 0.9%. 1000 milliLiter(s) (10 mL/Hr) IV Continuous <Continuous>    MEDICATIONS  (PRN):  acetaminophen    Suspension .. 650 milliGRAM(s) Oral every 6 hours PRN Temp greater or equal to 38C (100.4F)    LABS: All Lab data reviewed and analyzed                                    11.0   10.90 )-----------( 272      ( 2021 00:50 )             37.1   07-12    132<L>  |  93<L>  |  26<H>  ----------------------------<  142<H>  4.2   |  26  |  0.81    Ca    10.2      2021 00:50  Phos  4.3     07-12  Mg     2.2     07-12    TPro  9.5<H>  /  Alb  4.3  /  TBili  0.3  /  DBili  x   /  AST  23  /  ALT  10  /  AlkPhos  115  07-12    Ca    10.5      2021 00:29  Phos  3.8     07-11  Mg     2.0     07-11    TPro  9.4<H>  /  Alb  4.2  /  TBili  0.4  /  DBili  x   /  AST  19  /  ALT  10  /  AlkPhos  110  07-11    Ca    10.5      10 Jul 2021 00:20  Phos  3.5     07-10  Mg     2.0     07-10    TPro  9.8<H>  /  Alb  4.6  /  TBili  0.4  /  DBili  x   /  AST  19  /  ALT  8<L>  /  AlkPhos  119  07-10    Ca    10.4      2021 00:42  Phos  4.4     07-09  Mg     2.0     07-09    TPro  9.1<H>  /  Alb  4.4  /  TBili  0.4  /  DBili  x   /  AST  18  /  ALT  6<L>  /  AlkPhos  110  07-09    Ca    9.7      2021 01:31  Phos  3.3     07-08  Mg     2.0     07-08    TPro  8.6<H>  /  Alb  4.0  /  TBili  0.4  /  DBili  x   /  AST  18  /  ALT  5<L>  /  AlkPhos  105  07-08                           PTT - ( 2021 04:52 )  PTT:45.2 sec LIVER FUNCTIONS - ( 2021 00:42 )  Alb: 3.4 g/dL / Pro: 6.7 g/dL / ALK PHOS: 213 U/L / ALT: 15 U/L / AST: 24 U/L / GGT: x           RADIOLOGY: - Reviewed and analyzed LLL  pneumonia , LVAD HM2, CT scan of abdomen reviewed result noted

## 2021-07-12 NOTE — PROGRESS NOTE ADULT - ATTENDING COMMENTS
66yrs, DCM stage D. s/p HM II sept 2017 as long term therapy (not candidate due to PVD).   s/p long COVID hosp April 2020.   admitted for synmptomatic anemia with INR 8.8.   capsule endoscopy showed small bowel bleeding.   push enterocsopy 6.15 with decompensation and suspected aspiration pneumonia.   difficult weaning from vent. s/p trach 6.25.   current issues:   cdiff now on PO vanco.   1 pos culture staph completed antibiotcs.   slow vent wean on trach collar over weekend  VAD bridge to coumadin.   meds: heparin/coumadin, PPI, vanco PO, amnio 200, clonipine, lasix 40 PO QD. HDZN PRN, lopressor 25 Q12.   LVAD flows 9200, 4.9, PI 6.8, Power 5.5   HR , MAPs 79-88, Tlow 96.5.   i/O: +525, 455  07-12  132<L>  |  93<L>  |  26<H>  ----------------------------<  142<H>  4.2   |  26  |  0.81  Ca    10.2      12 Jul 2021 00:50  Phos  4.3     07-12Mg     2.2     07-12    TPro  9.5<H>  /  Alb  4.3  /  TBili  0.3  /  DBili  x   /  AST  23  /  ALT  10  /  AlkPhos  115  07-12                        11.0   10.90 )-----------( 272      ( 12 Jul 2021 00:50 )             37.1   INR 1.6.   continue current medical management.   conitinue vent wean and trach wean.   dose coumadin.   could start losartan 12.5 to 25 QD for BP.  make BB toprol XL 50.   Zi Werner

## 2021-07-12 NOTE — PROGRESS NOTE ADULT - SUBJECTIVE AND OBJECTIVE BOX
INFECTIOUS DISEASES FOLLOW UP-- Anitra Prater  [phone]    This is a follow up note for this  66yMale with  Anemia  depressed, withdrawn        ROS:  CONSTITUTIONAL:  No fever,   CARDIOVASCULAR:  No chest pain or palpitations  RESPIRATORY:  No dyspnea  GASTROINTESTINAL:  c/o abdminal distension  GENITOURINARY:  No dysuria  NEUROLOGIC:  No headache,     Allergies    No Known Allergies    Intolerances        ANTIBIOTICS/RELEVANT:  antimicrobials  vancomycin    Solution 125 milliGRAM(s) Oral every 12 hours    immunologic:    OTHER:  acetaminophen    Suspension .. 650 milliGRAM(s) Oral every 6 hours PRN  aMIOdarone    Tablet 200 milliGRAM(s) Oral daily  chlorhexidine 2% Cloths 1 Application(s) Topical <User Schedule>  clonazePAM  Tablet 0.5 milliGRAM(s) Oral at bedtime  furosemide    Tablet 40 milliGRAM(s) Oral daily  heparin  Infusion 1000 Unit(s)/Hr IV Continuous <Continuous>  hydrALAZINE 25 milliGRAM(s) Oral every 8 hours PRN  insulin lispro (ADMELOG) corrective regimen sliding scale   SubCutaneous every 6 hours  losartan 25 milliGRAM(s) Oral daily  metoprolol tartrate 25 milliGRAM(s) Oral every 12 hours  pantoprazole   Suspension 40 milliGRAM(s) Enteral Tube daily  warfarin 5 milliGRAM(s) Oral once      Objective:  Vital Signs Last 24 Hrs  T(C): 36.6 (12 Jul 2021 12:00), Max: 36.9 (12 Jul 2021 00:00)  T(F): 97.9 (12 Jul 2021 12:00), Max: 98.4 (12 Jul 2021 00:00)  HR: 114 (12 Jul 2021 18:00) (91 - 116)  BP: --  BP(mean): --  RR: 34 (12 Jul 2021 18:00) (12 - 34)  SpO2: 100% (12 Jul 2021 18:00) (98% - 100%)    PHYSICAL EXAM:  Constitutional:no acute distress  Eyes:ALONSO, EOMI  Ear/Nose/Throat: no oral lesions, trach site no erythema	  Respiratory: clear BL  Cardiovascular: S1S2 VADsounds  Gastrointestinal:soft, but still distended  VAD exit site CDI  Extremities:no e/e/c  No Lymphadenopathy  IV sites not inflammed.    LABS:                        11.0   10.90 )-----------( 272      ( 12 Jul 2021 00:50 )             37.1     07-12    132<L>  |  93<L>  |  26<H>  ----------------------------<  142<H>  4.2   |  26  |  0.81    Ca    10.2      12 Jul 2021 00:50  Phos  4.3     07-12  Mg     2.2     07-12    TPro  9.5<H>  /  Alb  4.3  /  TBili  0.3  /  DBili  x   /  AST  23  /  ALT  10  /  AlkPhos  115  07-12    PT/INR - ( 12 Jul 2021 14:54 )   PT: 18.8 sec;   INR: 1.60 ratio         PTT - ( 12 Jul 2021 00:50 )  PTT:52.8 sec      MICROBIOLOGY:    Culture - Blood (07.09.21 @ 17:39)    Specimen Source: .Blood Blood-Venous    Culture Results:   No growth to date.    Culture - Sputum . (07.07.21 @ 11:47)    Gram Stain:   Few polymorphonuclear leukocytes per low power field  No Squamous epithelial cells per low power field  No organisms seen per oil power field    -  Amikacin: S <=16    -  Amikacin: S <=16    -  Amoxicillin/Clavulanic Acid: R >16/8    -  Amoxicillin/Clavulanic Acid: R >16/8    -  Ampicillin: R >16 These ampicillin results predict results for amoxicillin    -  Ampicillin: R 16 These ampicillin results predict results for amoxicillin    -  Ampicillin/Sulbactam: R 16/8 Enterobacter, Citrobacter, and Serratia may develop resistance during prolonged therapy (3-4 days)    -  Ampicillin/Sulbactam: R >16/8 Enterobacter, Citrobacter, and Serratia may develop resistance during prolonged therapy (3-4 days)    -  Aztreonam: I 16    -  Aztreonam: S <=4    -  Cefazolin: R >16 Enterobacter, Citrobacter, and Serratia may develop resistance during prolonged therapy (3-4 days)    -  Cefazolin: R >16 Enterobacter, Citrobacter, and Serratia may develop resistance during prolonged therapy (3-4 days)    -  Cefepime: S <=2    -  Cefepime: S <=2    -  Cefoxitin: R <=8    -  Cefoxitin: R >16    -  Ceftriaxone: R 32 Enterobacter, Citrobacter, and Serratia may develop resistance during prolonged therapy    -  Ceftriaxone: S <=1 Enterobacter, Citrobacter, and Serratia may develop resistance during prolonged therapy    -  Ciprofloxacin: S <=0.25    -  Ciprofloxacin: S <=0.25    -  Ertapenem: S <=0.5    -  Ertapenem: S <=0.5    -  Gentamicin: S <=2    -  Gentamicin: S <=2    -  Imipenem: S <=1    -  Levofloxacin: S <=0.5    -  Levofloxacin: S <=0.5    -  Meropenem: S <=1    -  Meropenem: S <=1    -  Piperacillin/Tazobactam: S <=8    -  Piperacillin/Tazobactam: S <=8    -  Tobramycin: S 4    -  Tobramycin: S <=2    -  Trimethoprim/Sulfamethoxazole: S <=0.5/9.5    -  Trimethoprim/Sulfamethoxazole: S <=0.5/9.5    Specimen Source: .Sputum Sputum    Culture Results:   Moderate Citrobacter freundii  Moderate Serratia marcescens  Normal Respiratory Bria present    Organism Identification: Citrobacter freundii  Serratia marcescens    Organism: Citrobacter freundii    Organism: Serratia marcescens    Method Type: CLAU    Method Type: CLAU                RECENT CULTURES:  07-09 @ 17:39  .Blood Blood-Venous  --  --  --    No growth to date.  --  07-07 @ 11:47  .Sputum Sputum  Citrobacter freundii  Serratia marcescens  Citrobacter freundii  CLAU    Moderate Citrobacter freundii  Moderate Serratia marcescens  Normal Respiratory Bria present  --      RADIOLOGY & ADDITIONAL STUDIES:

## 2021-07-13 NOTE — PROGRESS NOTE ADULT - SUBJECTIVE AND OBJECTIVE BOX
RICKY HAMPTON  MRN-[id number]  Patient is a 66y old  Male who presents with a chief complaint of Anemia, Supratherapeutic INR, Dark Stools (2021 07:30)      HPI:  65M PMH ACC/AHA stage D HF due to NICM HM2 LVAD , TV annuloplasty ring 17 as destination therapy due to severe peripheral artery disease with significant stenosis  SIADH, Depression, CKD-3 with hyperkalemia, past E. coli UTIs, driveline drainage (21) and COVID-19 (back in 2020)  He was recently seen in clinic where he complained of abdominal pain and dark stools w constipation back in May. He presents to [facility] ER today weakness and fatigue, moderate and + Black stools for three days, on coumadin secondary to warfarin use in the setting of an LVAD. Patient has required transfusions for GIB in the past. Mostly recently back in 2021 pt had anemia with dark stools. No interventions was done at that time. However Last Endoscopy was done in 2020 (negative). Today labs show patient is anemic with H/H of 4.5/16.3,. INR is 8.84 MAP in the 90s, Temp 35.1. He denies any chest pain, shortness of breath, dizziness, abd pain, nausea or vomiting.       (2021 16:57)      Surgery/Hospital Course:   admit for melena w/ anemia, INR 8.84   6/15 Capsul study (+) for small bowel bleed, balloon endoscopy (old blood in prox ileum); post EGD - septic w/ L opacity, re-intubated for concern for aspiration, TTE (Mod MR, decrease biV w/ interventricular septum boweing towards R)   bronch    TC    CT C/A/P: Fluid filled colon which may be 2/2 rapid transit. Small bilateral pleffs with associates. Compressive atelectasis New ISABELLE & LLL  parenchymal opacities, suspicious for pneumonia. 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    #8 Shiley trach at bedside    CPAP trials    LVAD speed increased to 9200   Bronch; Central line dc'ed    Today:  No acute events     ICU Vital Signs Last 24 Hrs  T(C): 36.9 (2021 08:00), Max: 36.9 (2021 04:00)  T(F): 98.4 (2021 08:00), Max: 98.4 (2021 04:00)  HR: 127 (2021 07:00) (91 - 127)  BP: --  BP(mean): --  ABP: 92/85 (2021 07:00) (79/63 - 135/125)  ABP(mean): 89 (2021 07:00) (71 - 130)  RR: 30 (2021 07:00) (12 - 36)  SpO2: 100% (:00) (99% - 100%)      Physical Exam:  Gen:  Awake, alert   CNS: non focal 	  Neck: no JVD, +trach   RES : clear , no wheezing              CVS: Regular  rhythm. Normal S1/S2  Abd: Soft, non-distended. Bowel sounds present.  Skin: No rash.  Ext:  no edema    ============================I/O===========================   I&O's Detail    2021 07:01  -  2021 07:00  --------------------------------------------------------  IN:    Enteral Tube Flush: 120 mL    Heparin: 240 mL    IV PiggyBack: 100 mL    Miscellaneous Tube Feedin mL  Total IN: 1600 mL    OUT:    Emesis (mL): 250 mL    Voided (mL): 1325 mL  Total OUT: 1575 mL    Total NET: 25 mL      2021 07:01  -  2021 07:35  --------------------------------------------------------  IN:    Heparin: 10 mL  Total IN: 10 mL    OUT:    Miscellaneous Tube Feedin mL  Total OUT: 0 mL    Total NET: 10 mL        ============================ LABS =========================                        11.6   12.34 )-----------( 260      ( 2021 00:39 )             37.3     07-13    130<L>  |  91<L>  |  33<H>  ----------------------------<  141<H>  4.1   |  25  |  0.87    Ca    10.5      2021 00:39  Phos  4.6     13  Mg     2.4     13    TPro  9.9<H>  /  Alb  4.5  /  TBili  0.4  /  DBili  x   /  AST  22  /  ALT  10  /  AlkPhos  121<H>      LIVER FUNCTIONS - ( 2021 00:39 )  Alb: 4.5 g/dL / Pro: 9.9 g/dL / ALK PHOS: 121 U/L / ALT: 10 U/L / AST: 22 U/L / GGT: x           PT/INR - ( 2021 00:39 )   PT: 18.6 sec;   INR: 1.58 ratio         PTT - ( 2021 00:39 )  PTT:57.6 sec  ABG - ( 2021 14:47 )  pH, Arterial: 7.41  pH, Blood: x     /  pCO2: 49    /  pO2: 115   / HCO3: 30    / Base Excess: 5.4   /  SaO2: 98                  ======================Micro/Rad/Cardio=================  Culture: Reviewed   CXR: Reviewed  Echo:Reviewed  ======================================================  PAST MEDICAL & SURGICAL HISTORY:  CHF (congestive heart failure)    CAD (coronary artery disease)    Depression    Pleural effusion    History of 2019 novel coronavirus disease (COVID-19)  2020    Hemorrhoids    Bleeding hemorrhoids    Peripheral arterial disease    Claudication    BPH with urinary obstruction    ACC/AHA stage D systolic heart failure    Anticoagulation goal of INR 2.0 to 2.5    Falls    Clavicle fracture    CKD (chronic kidney disease), stage III    Iron deficiency anemia    H/O epistaxis    Vertigo    GI bleed    S/P TVR (tricuspid valve repair)    S/P ventricular assist device    S/P endoscopy      ====================ASSESSMENT ==============  Stage D Nonischemic Cardiomyopathy, Status Post HM2 on 2017   Recent driveline infection on 2021   Cardiogenic shock  Hemodynamic instability   Acute hypoxemic respiratory failure  GI bleed , Status Post Enteroscopy   Anemia, in setting of melena   Chronic Kidney Disease  Stress hyperglycemia   C.diff positive on    Hypovolemic shock    Plan:  ====================== NEUROLOGY=====================  Nonfocal, continue to monitor neuro status   Tylenol PRN for fevers   Klonopin for agitation     acetaminophen    Suspension .. 650 milliGRAM(s) Oral every 6 hours PRN Temp greater or equal to 38C (100.4F)  clonazePAM  Tablet 0.5 milliGRAM(s) Oral at bedtime  clonazePAM  Tablet 0.25 milliGRAM(s) Oral every 24 hours    ==================== RESPIRATORY======================  Acute hypoxemic resp failure s/p #8 shiley trach on on    Respiratory status requires intermittent ventilatory support with intermittent weaning to pressure support and trach collar, close monitoring of respiratory rate and breathing pattern, following of ABG’s with A-line monitoring, continuous pulse oximetry monitoring.     ====================CARDIOVASCULAR==================  Stage D Nonischemic Cardiomyopathy, Status Post HM2 on 2017; LVAD settings 9200 and flow 5.4   Echo 6/15: severe global Left ventricular systolic dysfunction with HM2 in place, decreased Right ventricular systolic function, interventricular septums bows slightly to right, otherwise grossly similar to prior.   Invasive hemodynamic monitoring, assess perfusion indices.   Continue rate control with Lopressor and Amiodarone   Pressor support with Hydralazine and Losartan     aMIOdarone    Tablet 200 milliGRAM(s) Oral daily  hydrALAZINE 25 milliGRAM(s) Oral every 8 hours PRN MAP greater than 85  losartan 25 milliGRAM(s) Oral daily  metoprolol tartrate 25 milliGRAM(s) Oral every 12 hours    ===================HEMATOLOGIC/ONC ===================  Anemia due to acute blood loss associated with small bowel bleed; monitor H&H/Plts  Continue AC therapy with IV Heparin for LVAD circuit and VTE prophylaxis   Monitor INR for coumadin dosing     heparin  Infusion 1000 Unit(s)/Hr (10 mL/Hr) IV Continuous <Continuous>    ===================== RENAL =========================  Continue monitoring urine output, I&OS, BUN/Cr   Replete lytes PRN. Keep K> 4 and Mg >2   Diuresis with Lasix     furosemide    Tablet 40 milliGRAM(s) Oral daily    ==================== GASTROINTESTINAL===================  Tolerating tube feeds, Vital AF @ 60cc/hr    GI prophylaxis, pantoprazole   Suspension 40 milliGRAM(s) Enteral Tube daily    =======================    ENDOCRINE  =====================  Hx of DMT2, glycemic control with Admelog sliding scale  Monitor blood glucose levels     insulin lispro (ADMELOG) corrective regimen sliding scale   SubCutaneous every 6 hours    ========================INFECTIOUS DISEASE================  Afebrile with WBC rising 1090->12.34   Monitor temperature and trend WBC  : C.diff positive, will continue Vancomycin for C.diff     vancomycin    Solution 125 milliGRAM(s) Oral every 12 hours      I have spent 35 minutes providing acute care for this critically ill patient     Patient requires continuous monitoring with bedside rhythm monitoring, pulse ox monitoring, and intermittent blood gas analysis. Care plan discussed with ICU care team. Patient remained critical and at risk for life threatening decompensation.     By signing my name below, I, Agnieszka Cherry, attest that this documentation has been prepared under the direction and in the presence of Kota Thomas MD   Electronically signed: Cesia Shaffer, 21 @ 07:35    I, Kota Thomas, personally performed the services described in this documentation. all medical record entries made by the luisibmel were at my direction and in my presence. I have reviewed the chart and agree that the record reflects my personal performance and is accurate and complete  Electronically signed: Kota Thomas MD        RICKY HAMPTON  MRN-[id number]  Patient is a 66y old  Male who presents with a chief complaint of Anemia, Supratherapeutic INR, Dark Stools (2021 07:30)      HPI:  65M PMH ACC/AHA stage D HF due to NICM HM2 LVAD , TV annuloplasty ring 17 as destination therapy due to severe peripheral artery disease with significant stenosis  SIADH, Depression, CKD-3 with hyperkalemia, past E. coli UTIs, driveline drainage (21) and COVID-19 (back in 2020)  He was recently seen in clinic where he complained of abdominal pain and dark stools w constipation back in May. He presents to [facility] ER today weakness and fatigue, moderate and + Black stools for three days, on coumadin secondary to warfarin use in the setting of an LVAD. Patient has required transfusions for GIB in the past. Mostly recently back in 2021 pt had anemia with dark stools. No interventions was done at that time. However Last Endoscopy was done in 2020 (negative). Today labs show patient is anemic with H/H of 4.5/16.3,. INR is 8.84 MAP in the 90s, Temp 35.1. He denies any chest pain, shortness of breath, dizziness, abd pain, nausea or vomiting.       (2021 16:57)      Surgery/Hospital Course:   admit for melena w/ anemia, INR 8.84   6/15 Capsul study (+) for small bowel bleed, balloon endoscopy (old blood in prox ileum); post EGD - septic w/ L opacity, re-intubated for concern for aspiration, TTE (Mod MR, decrease biV w/ interventricular septum boweing towards R)   bronch    TC    CT C/A/P: Fluid filled colon which may be 2/2 rapid transit. Small bilateral pleffs with associates. Compressive atelectasis New ISABELLE & LLL  parenchymal opacities, suspicious for pneumonia. 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    #8 Shiley trach at bedside    CPAP trials    LVAD speed increased to 9200   Bronch; Central line dc'ed    Today:  TC since , continue as tolerated. F/u afternoon INR for coumadin dosing tonight. Continue PT/ambulation as tolerated.      ICU Vital Signs Last 24 Hrs  T(C): 36.9 (2021 08:00), Max: 36.9 (2021 04:00)  T(F): 98.4 (2021 08:00), Max: 98.4 (2021 04:00)  HR: 127 (2021 07:00) (91 - 127)  BP: --  BP(mean): --  ABP: 92/85 (2021 07:00) (79/63 - 135/125)  ABP(mean): 89 (2021 07:00) (71 - 130)  RR: 30 (2021 07:00) (12 - 36)  SpO2: 100% (2021 07:00) (99% - 100%)      Physical Exam:  Gen:  NAD   CNS: non focal 	  Neck: no JVD, +trach   RES : clear , no wheezing              CVS: Regular  rhythm. Normal S1/S2  Abd: Soft, non-distended. Bowel sounds present.  Skin: No rash.  Ext:  no edema    ============================I/O===========================   I&O's Detail    2021 07:01  -  2021 07:00  --------------------------------------------------------  IN:    Enteral Tube Flush: 120 mL    Heparin: 240 mL    IV PiggyBack: 100 mL    Miscellaneous Tube Feedin mL  Total IN: 1600 mL    OUT:    Emesis (mL): 250 mL    Voided (mL): 1325 mL  Total OUT: 1575 mL    Total NET: 25 mL      2021 07:01  -  2021 07:35  --------------------------------------------------------  IN:    Heparin: 10 mL  Total IN: 10 mL    OUT:    Miscellaneous Tube Feedin mL  Total OUT: 0 mL    Total NET: 10 mL        ============================ LABS =========================                        11.6   12.34 )-----------( 260      ( 2021 00:39 )             37.3     07-13    130<L>  |  91<L>  |  33<H>  ----------------------------<  141<H>  4.1   |  25  |  0.87    Ca    10.5      2021 00:39  Phos  4.6     07-13  Mg     2.4     07-13    TPro  9.9<H>  /  Alb  4.5  /  TBili  0.4  /  DBili  x   /  AST  22  /  ALT  10  /  AlkPhos  121<H>  07-13    LIVER FUNCTIONS - ( 2021 00:39 )  Alb: 4.5 g/dL / Pro: 9.9 g/dL / ALK PHOS: 121 U/L / ALT: 10 U/L / AST: 22 U/L / GGT: x           PT/INR - ( 2021 00:39 )   PT: 18.6 sec;   INR: 1.58 ratio         PTT - ( 2021 00:39 )  PTT:57.6 sec  ABG - ( 2021 14:47 )  pH, Arterial: 7.41  pH, Blood: x     /  pCO2: 49    /  pO2: 115   / HCO3: 30    / Base Excess: 5.4   /  SaO2: 98                  ======================Micro/Rad/Cardio=================  Culture: Reviewed   CXR: Reviewed  Echo:Reviewed  ======================================================  PAST MEDICAL & SURGICAL HISTORY:  CHF (congestive heart failure)    CAD (coronary artery disease)    Depression    Pleural effusion    History of 2019 novel coronavirus disease (COVID-19)  2020    Hemorrhoids    Bleeding hemorrhoids    Peripheral arterial disease    Claudication    BPH with urinary obstruction    ACC/AHA stage D systolic heart failure    Anticoagulation goal of INR 2.0 to 2.5    Falls    Clavicle fracture    CKD (chronic kidney disease), stage III    Iron deficiency anemia    H/O epistaxis    Vertigo    GI bleed    S/P TVR (tricuspid valve repair)    S/P ventricular assist device    S/P endoscopy      ====================ASSESSMENT ==============  Stage D Nonischemic Cardiomyopathy, Status Post HM2 on 2017   Recent driveline infection on 2021   Cardiogenic shock  Hemodynamic instability   Acute hypoxemic respiratory failure  GI bleed , Status Post Enteroscopy   Anemia, in setting of melena   Chronic Kidney Disease  Stress hyperglycemia   C.diff positive on    Hypovolemic shock    Plan:  ====================== NEUROLOGY=====================  Nonfocal, continue to monitor neuro status   Tylenol PRN for fevers   Klonopin for agitation   PT/ambulation as tolerated     acetaminophen    Suspension .. 650 milliGRAM(s) Oral every 6 hours PRN Temp greater or equal to 38C (100.4F)  clonazePAM  Tablet 0.5 milliGRAM(s) Oral at bedtime  clonazePAM  Tablet 0.25 milliGRAM(s) Oral every 24 hours    ==================== RESPIRATORY======================  Acute hypoxemic resp failure s/p #8 shiley trach on on ; TC since , continue as tolerated.   Continue close monitoring of respiratory rate and breathing pattern, following of ABG’s with A-line monitoring, continuous pulse oximetry monitoring.     ====================CARDIOVASCULAR==================  Stage D Nonischemic Cardiomyopathy, Status Post HM2 on 2017; LVAD settings 9200 and flow 5.4   Echo 6/15: severe global Left ventricular systolic dysfunction with HM2 in place, decreased Right ventricular systolic function, interventricular septums bows slightly to right, otherwise grossly similar to prior.   Invasive hemodynamic monitoring, assess perfusion indices.   Continue rate control with Lopressor and Amiodarone   Pressor support with Hydralazine and Losartan     aMIOdarone    Tablet 200 milliGRAM(s) Oral daily  hydrALAZINE 25 milliGRAM(s) Oral every 8 hours PRN MAP greater than 85  losartan 25 milliGRAM(s) Oral daily  metoprolol tartrate 25 milliGRAM(s) Oral every 12 hours    ===================HEMATOLOGIC/ONC ===================  Anemia due to acute blood loss associated with small bowel bleed; monitor H&H/Plts  Continue AC therapy with IV Heparin for LVAD circuit and VTE prophylaxis   F/u afternoon INR for coumadin dosing tonight.    heparin  Infusion 1000 Unit(s)/Hr (10 mL/Hr) IV Continuous <Continuous>    ===================== RENAL =========================  Continue monitoring urine output, I&OS, BUN/Cr   Replete lytes PRN. Keep K> 4 and Mg >2   Diuresis with Lasix     furosemide    Tablet 40 milliGRAM(s) Oral daily    ==================== GASTROINTESTINAL===================  Tolerating tube feeds, Vital AF @ 60cc/hr    GI prophylaxis, pantoprazole   Suspension 40 milliGRAM(s) Enteral Tube daily    =======================    ENDOCRINE  =====================  Hx of DMT2, glycemic control with Admelog sliding scale  Monitor blood glucose levels     insulin lispro (ADMELOG) corrective regimen sliding scale   SubCutaneous every 6 hours    ========================INFECTIOUS DISEASE================  Afebrile with WBC rising 1090->12.34   Monitor temperature and trend WBC  : C.diff positive, will continue Vancomycin for C.diff     vancomycin    Solution 125 milliGRAM(s) Oral every 12 hours      I have spent 35 minutes providing acute care for this critically ill patient     Patient requires continuous monitoring with bedside rhythm monitoring, pulse ox monitoring, and intermittent blood gas analysis. Care plan discussed with ICU care team. Patient remained critical and at risk for life threatening decompensation.     By signing my name below, I, Agnieszka Cherry, attest that this documentation has been prepared under the direction and in the presence of Kota Thomas MD   Electronically signed: Cesia Shaffer, 21 @ 07:35    I, Kota Thomas, personally performed the services described in this documentation. all medical record entries made by the luisibe were at my direction and in my presence. I have reviewed the chart and agree that the record reflects my personal performance and is accurate and complete  Electronically signed: Kota Thomas MD

## 2021-07-13 NOTE — PROGRESS NOTE ADULT - SUBJECTIVE AND OBJECTIVE BOX
RICKY JOINT  MRN#: [id number]  Subjective:  Pulmonary progress  : recurrent Acute hypoxic respiratory Failure ,aspiration pneumonia, NICM  , chart reviewed and H/O obtained radiological and Laboratory study reviewed patient Examined     65M PMH ACC/AHA stage D HF due to NICM HM2 LVAD , TV annuloplasty ring 17 as destination therapy due to severe peripheral artery disease with significant stenosis  SIADH, Depression, CKD-3 with hyperkalemia, past E. coli UTIs, driveline drainage (21) and COVID-19 (back in 2020)  He was recently seen in clinic where he complained of abdominal pain and dark stools w constipation back in May. He presents to [facility] ER today weakness and fatigue, moderate and + Black stools for three days, on coumadin secondary to warfarin use in the setting of an LVAD. Patient has required transfusions for GIB in the past. Mostly recently back in 2021 pt had anemia with dark stools. No interventions was done at that time. However Last Endoscopy was done in 2020 (negative). Today labs show patient is anemic with H/H of 4.5/16.3,. INR is 8.84 MAP in the 90s, Temp 35.1. He denies any chest pain, shortness of breath, dizziness, abd pain, nausea or vomiting. found to have  rectal bleeding underwent endoscopy ,old blood in the proximal ileum ,  develop sepsis with LL opacity given Antibiotics , Extubated , reintubated , Bronchoscopy on Zosyn for LL pneumonia  and Amiodarone S/P TV Annuloplasty , patient remain intubated on full ventilatory support .S/P multiple units of blood transfusion , remain on full ventilatory support on Precedex and propofol , new central IJ line , diarrhea C diff. +ve on po vancomycin and IV Flagyl,  mildly distended belly , fever start on cefepime 2gm q 8 hrs S/P tracheostomy .  new RT Subclavian central line continue on contact  isolation ,still diarrhea on C-diff antibiotics ENT follow up appreciated , trial of C-PAP as tolerated , , copious secretion from trach. site chest x ray left lower lobe pneumonia , tolerating trch. collar 50% FI02 still excessive secretion need pulmonary toilet , off Ancef antibiotic , no more diarrhea back on full support mechanical ventilator , chest x ray show improvement in LLL air space disease, more awake and responsive on tube feeding no more diarrhea , S/P  Ancef for bacteremia no fever ,ID follow up noted ,  no nausea or vomiting or diarrhea still very weak and tired , Emesis over night .         (2021 16:57)    PAST MEDICAL & SURGICAL HISTORY:  CHF (congestive heart failure)    CAD (coronary artery disease)  Depression    Pleural effusion    History of 2019 novel coronavirus disease (COVID-19)  2020    Hemorrhoids    Bleeding hemorrhoids    Peripheral arterial disease    Claudication    BPH with urinary obstruction    ACC/AHA stage D systolic heart failure  Anticoagulation goal of INR 2.0 to 2.5    Falls    Clavicle fracture    CKD (chronic kidney disease), stage III    Iron deficiency anemia    H/O epistaxis    Vertigo    GI bleed    S/P TVR (tricuspid valve repair)    S/P ventricular assist device    S/P endoscopy    OBJECTIVE:  ICU Vital Signs Last 24 Hrs  T(C): 38.2 (2021 10:00), Max: 38.5 (2021 12:00)  T(F): 100.8 (2021 10:00), Max: 101.3 (2021 12:00)  HR: 65 (2021 10:00) (61 - 69)  BP: --  BP(mean): --  ABP: 105/67 (2021 10:00) (90/54 - 113/64)  ABP(mean): 77 (2021 10:00) (63 - 77)  RR: 20 (2021 10:00) (19 - 35)  SpO2: 99% (2021 10:00) (96% - 100%)       @ : @ 07:00  --------------------------------------------------------  IN: 2693.9 mL / OUT: 1415 mL / NET: 1278.9 mL     @ 07:  -   @ 10:49  --------------------------------------------------------  IN: 420.8 mL / OUT: 115 mL / NET: 305.8 mL  PHYSICAL EXAM:Daily   Elderly male S/P tracheostomy   on full ventilatory support /40%20/5cm PEEP  0n vasopressin , alternating with trach. collar on IV heparin   Daily Weight in k.4 (2021 00:00)  HEENT:     + NCAT  + EOMI  - Conjuctival edema   - Icterus   - Thrush   - ETT  + NGT/OGT  Neck:         + FROM   RT SC line  JVD     - Nodes     - Masses    + Mid-line trachea + Tracheostomy  Chest: normal findings  Lungs:          + CTA   + Rhonchi    - Rales    - Wheezing + Decreased  LT BS   - Dullness R L  Cardiac:       + S1 + S2    + RRR   - Irregular   - S3  - S4    - Murmurs   - Rub   - Hamman’s sign   Abdomen:    + BS     + Soft    + Non-tender     - Distended    - Organomegaly  - PEG  Extremities:   - Cyanosis U/L   - Clubbing  U/L  + LE/UE Edema   + Capillary refill    + Pulses   Neuro:        - Awake   -  Alert   - Confused   - Lethargic   + Sedated  + Generalized Weakness  Skin:        - Rashes    - Erythema   + Normal incisions   + IV sites intact          HOSPITAL MEDICATIONS: All mediciations reviewed and analyzed  MEDICATIONS  (STANDING):  aMIOdarone    Tablet 200 milliGRAM(s) Oral daily  chlorhexidine 0.12% Liquid 15 milliLiter(s) Oral Mucosa every 12 hours  chlorhexidine 2% Cloths 1 Application(s) Topical <User Schedule>  dexMEDEtomidine Infusion 0.5 MICROgram(s)/kG/Hr (9.81 mL/Hr) IV Continuous <Continuous>  dextrose 50% Injectable 50 milliLiter(s) IV Push every 15 minutes  heparin  Infusion 400 Unit(s)/Hr (12.5 mL/Hr) IV Continuous <Continuous>  HYDROmorphone  Injectable 0.5 milliGRAM(s) IV Push once  insulin lispro (ADMELOG) corrective regimen sliding scale   SubCutaneous every 6 hours  pantoprazole  Injectable 40 milliGRAM(s) IV Push every 12 hours  piperacillin/tazobactam IVPB.. 3.375 Gram(s) IV Intermittent every 8 hours  propofol Infusion 20 MICROgram(s)/kG/Min (9.42 mL/Hr) IV Continuous <Continuous>  sodium chloride 0.9% lock flush 3 milliLiter(s) IV Push every 8 hours  sodium chloride 0.9%. 1000 milliLiter(s) (10 mL/Hr) IV Continuous <Continuous>    MEDICATIONS  (PRN):  acetaminophen    Suspension .. 650 milliGRAM(s) Oral every 6 hours PRN Temp greater or equal to 38C (100.4F)    LABS: All Lab data reviewed and analyzed                                   .   12.34 )-----------( 260      ( 2021 00:39 )             37.3   07-13    130<L>  |  91<L>  |  33<H>  ----------------------------<  141<H>  4.1   |  25  |  0.87    Ca    10.5      2021 00:39  Phos  4.6     07-13  Mg     2.4     07-13    TPro  9.9<H>  /  Alb  4.5  /  TBili  0.4  /  DBili  x   /  AST  22  /  ALT  10  /  AlkPhos  121<H>  07-13    Ca    10.2      2021 00:50  Phos  4.3     07-12  Mg     2.2     07-12    TPro  9.5<H>  /  Alb  4.3  /  TBili  0.3  /  DBili  x   /  AST  23  /  ALT  10  /  AlkPhos  115  07-12    Ca    10.5      2021 00:29  Phos  3.8     07-11  Mg     2.0     07-11    TPro  9.4<H>  /  Alb  4.2  /  TBili  0.4  /  DBili  x   /  AST  19  /  ALT  10  /  AlkPhos  110  07-11    Ca    10.5      10 Jul 2021 00:20  Phos  3.5     07-10  Mg     2.0     07-10    TPro  9.8<H>  /  Alb  4.6  /  TBili  0.4  /  DBili  x   /  AST  19  /  ALT  8<L>  /  AlkPhos  119  07-10                                   PTT - ( 2021 04:52 )  PTT:45.2 sec LIVER FUNCTIONS - ( 2021 00:42 )  Alb: 3.4 g/dL / Pro: 6.7 g/dL / ALK PHOS: 213 U/L / ALT: 15 U/L / AST: 24 U/L / GGT: x           RADIOLOGY: - Reviewed and analyzed LLL  pneumonia , LVAD HM2, CT scan of abdomen reviewed result noted

## 2021-07-13 NOTE — PROGRESS NOTE ADULT - PROBLEM SELECTOR PLAN 1
- tolerating metoprolol tartrate 25 bid, transition to toprol when tolerating PO  - tolerating losartan 25mg today   - Continue lasix 40 mg PO daily  - Continue home amiodarone 200mg PO daily. - tolerating metoprolol tartrate 25 bid, transition to toprol when tolerating PO  - tolerating losartan 25mg today   - hold diuretics in the setting of fluid losses   - Continue home amiodarone 200mg PO daily. - tolerating metoprolol tartrate 25 bid, transition to toprol when tolerating PO  - hold losartan, hydral prn    - hold diuretics in the setting of fluid losses   - Continue home amiodarone 200mg PO daily.

## 2021-07-13 NOTE — PROGRESS NOTE ADULT - ATTENDING COMMENTS
slightly worse today.   vomitting X 2. contiues to have diahrrea.   appears somulent. while he has been managed on trach collar, he has much secretions and appears tachypneic.   meds: heparin (PTT  50s), PPI, vanco 125 Q12, amnio 200, cloinpine .5/.25. PRN diuretics. lopressor 25 Q12. PPI  LVAD 9200, 3.2, PI 2.7  -120SR, MAPs variable 60-90s. Tmax 37.  I/O: +25cc.  07-13  130<L>  |  91<L>  |  33<H>  ----------------------------<  141<H>  4.1   |  25  |  0.87    Ca    10.5      13 Jul 2021 00:39  Phos  4.6     07-13  Mg     2.4     07-13  TPro  9.9<H>  /  Alb  4.5  /  TBili  0.4  /  DBili  x   /  AST  22  /  ALT  10  /  AlkPhos  121<H>  07-13                        11.6   12.34 )-----------( 260      ( 13 Jul 2021 00:39 )             37.3   WBC 12.3, 10.9  Worse today. copious secretions, possibly causing aspiration.   continues to have diahrea.   Plan:  Will ask Dr Prater to review cdif therapy.   hold diuretics. close f/u of I/O including diahrrea. Patient may require fluid resucc  replace NG tube and feeds.   prn hydralazine IV .   Zi Werner

## 2021-07-13 NOTE — PROGRESS NOTE ADULT - SUBJECTIVE AND OBJECTIVE BOX
INFECTIOUS DISEASES FOLLOW UP-- Anitra Prater  [phone]    This is a follow up note for this  66yMale with  Anemia  awake, alert, interactive but frustrated and angry  increasing mucusy stool production        ROS:  CONSTITUTIONAL: awake, alert, trach collar    Allergies    No Known Allergies    Intolerances        ANTIBIOTICS/RELEVANT:  antimicrobials  vancomycin    Solution 125 milliGRAM(s) Oral every 12 hours    immunologic:    OTHER:  acetaminophen    Suspension .. 650 milliGRAM(s) Oral every 6 hours PRN  aMIOdarone    Tablet 200 milliGRAM(s) Oral daily  chlorhexidine 2% Cloths 1 Application(s) Topical <User Schedule>  clonazePAM  Tablet 0.5 milliGRAM(s) Oral at bedtime  clonazePAM  Tablet 0.25 milliGRAM(s) Oral every 24 hours  heparin  Infusion 1000 Unit(s)/Hr IV Continuous <Continuous>  hydrALAZINE 25 milliGRAM(s) Oral every 8 hours PRN  insulin lispro (ADMELOG) corrective regimen sliding scale   SubCutaneous every 6 hours  metoprolol tartrate 25 milliGRAM(s) Oral every 12 hours  pantoprazole   Suspension 40 milliGRAM(s) Enteral Tube daily  warfarin 5 milliGRAM(s) Oral once      Objective:  Vital Signs Last 24 Hrs  T(C): 36.1 (13 Jul 2021 16:00), Max: 36.9 (13 Jul 2021 04:00)  T(F): 97 (13 Jul 2021 16:00), Max: 98.4 (13 Jul 2021 04:00)  HR: 115 (13 Jul 2021 20:00) (102 - 130)  BP: --  BP(mean): 80 (13 Jul 2021 20:00) (60 - 80)  RR: 40 (13 Jul 2021 20:00) (19 - 40)  SpO2: 100% (13 Jul 2021 20:00) (97% - 100%)    PHYSICAL EXAM:  Constitutional:no acute distress  Eyes:ALONSO, EOMI  Ear/Nose/Throat: no oral lesions, trach with copious secretions	  Respiratory: audiible bilat  Cardiovascular: S1S2 vad sounds  Gastrointestinal: +BS, distended but soft  Extremities:no e/e/c  No Lymphadenopathy  IV sites not inflammed.    LABS:                        11.6   12.34 )-----------( 260      ( 13 Jul 2021 00:39 )             37.3     07-13    130<L>  |  91<L>  |  33<H>  ----------------------------<  141<H>  4.1   |  25  |  0.87    Ca    10.5      13 Jul 2021 00:39  Phos  4.6     07-13  Mg     2.4     07-13    TPro  9.9<H>  /  Alb  4.5  /  TBili  0.4  /  DBili  x   /  AST  22  /  ALT  10  /  AlkPhos  121<H>  07-13    PT/INR - ( 13 Jul 2021 16:08 )   PT: 20.2 sec;   INR: 1.73 ratio         PTT - ( 13 Jul 2021 16:08 )  PTT:49.6 sec      MICROBIOLOGY:            RECENT CULTURES:  07-09 @ 17:39  .Blood Blood-Venous  --  --  --    No growth to date.  --  07-07 @ 11:47  .Sputum Sputum  Citrobacter freundii  Serratia marcescens  Citrobacter freundii  CLAU    Moderate Citrobacter freundii  Moderate Serratia marcescens  Normal Respiratory Bria present  --      RADIOLOGY & ADDITIONAL STUDIES:    < from: Xray Chest 1 View- PORTABLE-Routine (Xray Chest 1 View- PORTABLE-Routine in AM.) (07.13.21 @ 03:39) >  The heart appears to be normal on the current study. The lungs appear to be clear. All life supporting devices are in good position and unchanged when compared to previous study done July 12, 2021 at 2:02 AM. No pleural effusion. No pneumothorax.    < end of copied text >

## 2021-07-13 NOTE — PROGRESS NOTE ADULT - ASSESSMENT
imp/rx:  Suspected aspiration event at time of Endoscopic procedure with subsequent development of C.diff diarrhea  Now with increasing leukocytosis and gram + cocci in clusters from blood culture sent 6/26 with organism ID/sensitivity pending  Started IV Vancomycin  Vanco CLAU=4 is borderline  Will change antibiotics to Cefazolin  Would change CVC lines as feasible   repeat blood cultures 6/29 x2 sets are negative  completed therapy with Cefazolin        Clostridiodes difficile :  Abdomen somewhat distended- but soft- loose stool again noted  WBC within the normal range  Continue Vanco oral but reduce to tapering dose of 125mg bid  But diarrhea improved when feeds were  held making it likely that the enteral feeds are also contributing to the ongoing diarrhea  as stool is again mucusy and increasing will try to obtain FMT product from [employer]    Trach secretions  colonized with mixed gram negative cornelia including citrobacter and serratia  If he decompensates or becomes febrile would restart antibiotics with Meropenem to cover the citrobacter and the serratia colonizing his sputum        Morris Prater MD  [phone]  After 5pm/weekends [phone]

## 2021-07-13 NOTE — PROGRESS NOTE ADULT - PROBLEM SELECTOR PLAN 5
-Continue w/ abxs for VAP tx per I  -C/w tx acute Cdiff, as above.  -One set of bcx w/ Staph lugdunensis which have since cleared, completed course of antibiotics -C/w tx acute Cdiff, appreciate ID input   -One set of bcx w/ Staph lugdunensis which have since cleared, completed course of antibiotics

## 2021-07-13 NOTE — PROGRESS NOTE ADULT - SUBJECTIVE AND OBJECTIVE BOX
Patient seen and examined at bedside.    Overnight Events: No acute events overnight, remains on trach collar.     Review Of Systems: No chest pain, shortness of breath, or palpitations            Current Meds:  acetaminophen    Suspension .. 650 milliGRAM(s) Oral every 6 hours PRN  aMIOdarone    Tablet 200 milliGRAM(s) Oral daily  chlorhexidine 2% Cloths 1 Application(s) Topical <User Schedule>  clonazePAM  Tablet 0.5 milliGRAM(s) Oral at bedtime  clonazePAM  Tablet 0.25 milliGRAM(s) Oral every 24 hours  furosemide    Tablet 40 milliGRAM(s) Oral daily  heparin  Infusion 1000 Unit(s)/Hr IV Continuous <Continuous>  hydrALAZINE 25 milliGRAM(s) Oral every 8 hours PRN  insulin lispro (ADMELOG) corrective regimen sliding scale   SubCutaneous every 6 hours  losartan 25 milliGRAM(s) Oral daily  metoprolol tartrate 25 milliGRAM(s) Oral every 12 hours  pantoprazole   Suspension 40 milliGRAM(s) Enteral Tube daily  vancomycin    Solution 125 milliGRAM(s) Oral every 12 hours      Vitals:  T(F): 98.4 (07-13), Max: 98.4 (07-13)  HR: 127 (07-13) (91 - 127)  BP: --  RR: 30 (07-13)  SpO2: 100% (07-13)  I&O's Summary    12 Jul 2021 07:01  -  13 Jul 2021 07:00  --------------------------------------------------------  IN: 1600 mL / OUT: 1575 mL / NET: 25 mL    Physical Exam:  Appearance: No acute distress; well appearing  Cardiovascular: RRR, S1, S2, no murmurs, rubs, or gallops; no edema; no JVD  Respiratory: Clear to auscultation bilaterally  Gastrointestinal: soft, non-tender, non-distended with normal bowel sounds  Musculoskeletal: No clubbing; no joint deformity   Neurologic: Non-focal  Psychiatry: AAOx3, mood & affect appropriate  Skin: No rashes, ecchymoses, or cyanosis                          11.6   12.34 )-----------( 260      ( 13 Jul 2021 00:39 )             37.3     07-13    130<L>  |  91<L>  |  33<H>  ----------------------------<  141<H>  4.1   |  25  |  0.87    Ca    10.5      13 Jul 2021 00:39  Phos  4.6     07-13  Mg     2.4     07-13    TPro  9.9<H>  /  Alb  4.5  /  TBili  0.4  /  DBili  x   /  AST  22  /  ALT  10  /  AlkPhos  121<H>  07-13    PT/INR - ( 13 Jul 2021 00:39 )   PT: 18.6 sec;   INR: 1.58 ratio      PTT - ( 13 Jul 2021 00:39 )  PTT:57.6 sec

## 2021-07-13 NOTE — PROGRESS NOTE ADULT - ASSESSMENT
Assessment and Recommendation:   · Assessment	  Assessment and recommendation :  Recurrent Acute hypoxic respiratory Failure S/P tracheostomy on full ventilatory support alternating with trach. collar at 50% Fi02  Acute left lower lobe pneumonia  possible Aspiration pneumonia   Stool is  +ve for C-diff. colitis on PO vancomycin  improved   nutrition not appreciated S/P emesis last night   S/P  Ancef improved for bacteremia   Non ischemic cardiomyopathy continue ACE inhibitor and B-Blockers   S/P Septic shock and cardiogenic shock   Stage D systolic heart failure S/P LVAD HM2   MH2 LVAD  with  TV Annuloplasty  Severe peripheral vascular disease   S/P Anion gap metabolic acidosis  severe hyperglycemia on insulin coverage    On  Amiodarone and IV heparin   critical care polyneuropathy   Anemia of Acute blood Loss   S/P Small bowel bleeding   S/P blood and FFP transfusion   Chronic kidney disease stage III  Continue NGT feeding   GI prophylaxis  discuss with TCV surgeon

## 2021-07-13 NOTE — CHART NOTE - NSCHARTNOTEFT_GEN_A_CORE
Seen for: nutrition follow up   Source: patient, RN, NP, EMR, HF rounds    Chart reviewed, events noted. 65 year old male with PMHx of NICM, s/p  HM2 LVAD in , on coumadin, CKD 3 presents with anemia, dark stools and supra therapeutic INR. S/p capsule study, endoscopy. Course c/b possible aspiration event. C-Diff +, being treated with vancomycin. S/p tracheostomy on . Intermittently on trach collar, tolerated 16 hours yesterday.     Diet, NPO with Tube Feed: Vital AF @ 60 ml/hr x 24 hours + Probiotic smoothie 2x/day    EN Order Provides: 1728kcals and 108gm protein  - 22kcals/kg and 1.37gm pro/kg based on dosing weight: 78.5kg    EN provision per chart:   () 20% of goal, now held as pt pulled NGT  () 92% of goal  () 100% of goal  (7/10) 100% of goal   () 50% of goal    GI:  Pt with diarrhea, however not consistently (stool count below). Per ID: "diarrhea improved when feeds were  held making it likely that the enteral feeds are also contributing to the ongoing diarrhea"   - Bowel Regimen: none at this time  - C. Diff + ()  - "Emesis" overnight, per discussion with PA/RN, unclear whether emesis is GI contents/tube feeds versus tracheal secretions    Stool count:   () 2x thus far  () none noted  () none noted  (7/10) 1x  () 8x  () none noted  () none noted  () 2x    Weight history:   Admission weight: 176.3 pounds / 80 kg (6/15)    Weights:   Daily Weight in k.4 ()  question accuracy of weight, significantly lower than weight 2 days prior - will continue to trend , Weight in k.2 (), Weight in k.1 (07-10), Weight in k.5 ()    MEDICATIONS  (STANDING):  albumin human  5% IVPB  albumin human  5% IVPB  aMIOdarone    Tablet  furosemide   Injectable  insulin lispro (ADMELOG) corrective regimen sliding scale  losartan  metoprolol tartrate  pantoprazole   Suspension  vancomycin    Solution    Pertinent Labs:  @ 00:39: Na 130<L>, BUN 33<H>, Cr 0.87, <H>, K+ 4.1, Phos 4.6<H>, Mg 2.4, Alk Phos 121<H>, ALT/SGPT 10, AST/SGOT 22, HbA1c --    A1C with Estimated Average Glucose Result: 5.6 % (06-15-21 @ 02:42)    Finger Sticks:  POCT Blood Glucose.: 123 mg/dL ( @ 17:13)  POCT Blood Glucose.: 143 mg/dL ( @ 12:17)    Triglycerides, Serum: 145 mg/dL (21 @ 00:53)  Triglycerides, Serum: 191 mg/dL (21 @ 00:36)  Triglycerides, Serum: 183 mg/dL (21 @ 00:43)  Triglycerides, Serum: 518 mg/dL (21 @ 09:02)    Skin per chart: no pressure injuries  Edema per chart: none noted    Estimated Nutrition Needs:   - Using dosing weight 78.5 kg, considering vent support  - Energy Needs (20-25 kcal/kg): 3735-2773 kcal/day  - Protein Needs (1.2-1.4 g/kg):     Previous Nutrition Diagnosis:  Acute moderate malnutrition  Nutrition diagnosis is ongoing, being addressed with tube feeds as tolerated    New Nutrition Diagnosis: n/a       Recommendations:      1) Change formula to Jevity 1.2 to provide more fiber to aid in diarrhea. Recommend Jevity 1.2 @ 60 ml/hr x 24 hours to meet 1728 kcal, 80 g protein (meeting 22 kcal/kg, 1.02 g/kg protein using dosing weight 78.5 kg). To provide 18g more dietary fiber than Vital AF at same volume.  2) Pending tolerance of formula change, when meets goal rate, add Prosource TF supplement 2x/day to provide 102 g protein to meet 1.3 g/kg/day  3) Continue Probiotic smoothie  4) Consider Banatrol as needed to aid in stool bulking    Continue monitoring and evaluating:   - Labs: blood glucose, electrolytes, renal indices  - GI function and bowel movements  - Nutritional intake, weight trends, skin integrity    RD remains available PRN and will follow up per protocol.   Kandice Peña RD, CDN, [facility]   Pager # [phone]

## 2021-07-14 NOTE — PROGRESS NOTE ADULT - PROBLEM SELECTOR PLAN 6
-No further active blood loss since correction of INR and no active bleeding seen on enteroscopy.  -Continue to trend CBC closely as anticoagulation re-initiated.  -Continue PPI -C/w tx acute Cdiff, appreciate ID input   -One set of bcx w/ Staph lugdunensis which have since cleared, completed course of antibiotics

## 2021-07-14 NOTE — PROGRESS NOTE ADULT - PROBLEM SELECTOR PROBLEM 6
Anemia, unspecified type Sepsis, due to unspecified organism, unspecified whether acute organ dysfunction present

## 2021-07-14 NOTE — PROGRESS NOTE ADULT - PROBLEM SELECTOR PLAN 5
-C/w tx acute Cdiff, appreciate ID input   -One set of bcx w/ Staph lugdunensis which have since cleared, completed course of antibiotics - in s/o diarrhea, NPO, diuresis   - ongoing IVF  - trend Cr, avoid nephrotoxins   - CVL for CVP monitoring

## 2021-07-14 NOTE — PROGRESS NOTE ADULT - PROBLEM SELECTOR PROBLEM 5
Sepsis, due to unspecified organism, unspecified whether acute organ dysfunction present RASHAWN (acute kidney injury)

## 2021-07-14 NOTE — PROGRESS NOTE ADULT - SUBJECTIVE AND OBJECTIVE BOX
RICKY JOINT  MRN#: [id number]  Subjective:  Pulmonary progress  : recurrent Acute hypoxic respiratory Failure ,aspiration pneumonia, NICM  , chart reviewed and H/O obtained radiological and Laboratory study reviewed patient Examined     65M PMH ACC/AHA stage D HF due to NICM HM2 LVAD , TV annuloplasty ring 17 as destination therapy due to severe peripheral artery disease with significant stenosis  SIADH, Depression, CKD-3 with hyperkalemia, past E. coli UTIs, driveline drainage (21) and COVID-19 (back in 2020)  He was recently seen in clinic where he complained of abdominal pain and dark stools w constipation back in May. He presents to [facility] ER today weakness and fatigue, moderate and + Black stools for three days, on coumadin secondary to warfarin use in the setting of an LVAD. Patient has required transfusions for GIB in the past. Mostly recently back in 2021 pt had anemia with dark stools. No interventions was done at that time. However Last Endoscopy was done in 2020 (negative). Today labs show patient is anemic with H/H of 4.5/16.3,. INR is 8.84 MAP in the 90s, Temp 35.1. He denies any chest pain, shortness of breath, dizziness, abd pain, nausea or vomiting. found to have  rectal bleeding underwent endoscopy ,old blood in the proximal ileum ,  develop sepsis with LL opacity given Antibiotics , Extubated , reintubated , Bronchoscopy on Zosyn for LL pneumonia  and Amiodarone S/P TV Annuloplasty , patient remain intubated on full ventilatory support .S/P multiple units of blood transfusion , remain on full ventilatory support on Precedex and propofol , new central IJ line , diarrhea C diff. +ve on po vancomycin and IV Flagyl,  mildly distended belly , fever start on cefepime 2gm q 8 hrs S/P tracheostomy .  new RT Subclavian central line continue on contact  isolation ,still diarrhea on C-diff antibiotics ENT follow up appreciated , trial of C-PAP as tolerated , , copious secretion from trach. site chest x ray left lower lobe pneumonia , tolerating trch. collar 50% FI02 still excessive secretion need pulmonary toilet , off Ancef antibiotic , no more diarrhea back on full support mechanical ventilator , chest x ray show improvement in LLL air space disease, more awake and responsive on tube feeding no more diarrhea , S/P  Ancef for bacteremia no fever ,ID follow up noted ,  no nausea or vomiting or diarrhea still very weak and tired , event note pulled NG tube now replaced          (2021 16:57)    PAST MEDICAL & SURGICAL HISTORY:  CHF (congestive heart failure)    CAD (coronary artery disease)  Depression    Pleural effusion    History of 2019 novel coronavirus disease (COVID-19)  2020    Hemorrhoids    Bleeding hemorrhoids    Peripheral arterial disease    Claudication    BPH with urinary obstruction    ACC/AHA stage D systolic heart failure  Anticoagulation goal of INR 2.0 to 2.5    Falls    Clavicle fracture    CKD (chronic kidney disease), stage III    Iron deficiency anemia    H/O epistaxis    Vertigo    GI bleed    S/P TVR (tricuspid valve repair)    S/P ventricular assist device    S/P endoscopy    OBJECTIVE:  ICU Vital Signs Last 24 Hrs  T(C): 38.2 (2021 10:00), Max: 38.5 (2021 12:00)  T(F): 100.8 (2021 10:00), Max: 101.3 (2021 12:00)  HR: 65 (2021 10:00) (61 - 69)  BP: --  BP(mean): --  ABP: 105/67 (2021 10:00) (90/54 - 113/64)  ABP(mean): 77 (2021 10:00) (63 - 77)  RR: 20 (2021 10:00) (19 - 35)  SpO2: 99% (2021 10:00) (96% - 100%)       @ 07: @ 07:00  --------------------------------------------------------  IN: 2693.9 mL / OUT: 1415 mL / NET: 1278.9 mL     @ 07:01  -   @ 10:49  --------------------------------------------------------  IN: 420.8 mL / OUT: 115 mL / NET: 305.8 mL  PHYSICAL EXAM:Daily   Elderly male S/P tracheostomy   on full ventilatory support /40%20/5cm PEEP  0n vasopressin , alternating with trach. collar on IV heparin   Daily Weight in k.4 (2021 00:00)  HEENT:     + NCAT  + EOMI  - Conjuctival edema   - Icterus   - Thrush   - ETT  + NGT/OGT  Neck:         + FROM   RT SC line  JVD     - Nodes     - Masses    + Mid-line trachea + Tracheostomy  Chest: normal findings  Lungs:          + CTA   + Rhonchi    - Rales    - Wheezing + Decreased  LT BS   - Dullness R L  Cardiac:       + S1 + S2    + RRR   - Irregular   - S3  - S4    - Murmurs   - Rub   - Hamman’s sign   Abdomen:    + BS     + Soft    + Non-tender     - Distended    - Organomegaly  - PEG  Extremities:   - Cyanosis U/L   - Clubbing  U/L  + LE/UE Edema   + Capillary refill    + Pulses   Neuro:        - Awake   -  Alert   - Confused   - Lethargic   + Sedated  + Generalized Weakness  Skin:        - Rashes    - Erythema   + Normal incisions   + IV sites intact          HOSPITAL MEDICATIONS: All mediciations reviewed and analyzed  MEDICATIONS  (STANDING):  aMIOdarone    Tablet 200 milliGRAM(s) Oral daily  chlorhexidine 0.12% Liquid 15 milliLiter(s) Oral Mucosa every 12 hours  chlorhexidine 2% Cloths 1 Application(s) Topical <User Schedule>  dexMEDEtomidine Infusion 0.5 MICROgram(s)/kG/Hr (9.81 mL/Hr) IV Continuous <Continuous>  dextrose 50% Injectable 50 milliLiter(s) IV Push every 15 minutes  heparin  Infusion 400 Unit(s)/Hr (12.5 mL/Hr) IV Continuous <Continuous>  HYDROmorphone  Injectable 0.5 milliGRAM(s) IV Push once  insulin lispro (ADMELOG) corrective regimen sliding scale   SubCutaneous every 6 hours  pantoprazole  Injectable 40 milliGRAM(s) IV Push every 12 hours  piperacillin/tazobactam IVPB.. 3.375 Gram(s) IV Intermittent every 8 hours  propofol Infusion 20 MICROgram(s)/kG/Min (9.42 mL/Hr) IV Continuous <Continuous>  sodium chloride 0.9% lock flush 3 milliLiter(s) IV Push every 8 hours  sodium chloride 0.9%. 1000 milliLiter(s) (10 mL/Hr) IV Continuous <Continuous>    MEDICATIONS  (PRN):  acetaminophen    Suspension .. 650 milliGRAM(s) Oral every 6 hours PRN Temp greater or equal to 38C (100.4F)    LABS: All Lab data reviewed and analyzed                                    8.9    19.79 )-----------( 190      ( 2021 00:43 )             28.6     07-14    134<L>  |  91<L>  |  50<H>  ----------------------------<  101<H>  4.1   |  28  |  2.00<H>    Ca    10.5      2021 00:43  Phos  4.1     07-14  Mg     2.4     07-14    TPro  9.1<H>  /  Alb  5.5<H>  /  TBili  0.8  /  DBili  x   /  AST  16  /  ALT  10  /  AlkPhos  81  07-14      Ca    10.5      2021 00:39  Phos  4.6     07-13  Mg     2.4     07-13    TPro  9.9<H>  /  Alb  4.5  /  TBili  0.4  /  DBili  x   /  AST  22  /  ALT  10  /  AlkPhos  121<H>  07-13    Ca    10.2      2021 00:50  Phos  4.3     07-12  Mg     2.2     07-12    TPro  9.5<H>  /  Alb  4.3  /  TBili  0.3  /  DBili  x   /  AST  23  /  ALT  10  /  AlkPhos  115  07-12    Ca    10.5      2021 00:29  Phos  3.8     07-11  Mg     2.0     07-11    TPro  9.4<H>  /  Alb  4.2  /  TBili  0.4  /  DBili  x   /  AST  19  /  ALT  10  /  AlkPhos  110  07-11                                     PTT - ( 2021 04:52 )  PTT:45.2 sec LIVER FUNCTIONS - ( 2021 00:42 )  Alb: 3.4 g/dL / Pro: 6.7 g/dL / ALK PHOS: 213 U/L / ALT: 15 U/L / AST: 24 U/L / GGT: x           RADIOLOGY: - Reviewed and analyzed LLL  pneumonia , LVAD HM2, CT scan of abdomen reviewed result noted

## 2021-07-14 NOTE — PROGRESS NOTE ADULT - PROBLEM SELECTOR PLAN 1
- tolerating metoprolol tartrate 25 bid, transition to toprol when tolerating PO  - hold losartan, hydral prn    - hold diuretics in the setting of fluid losses   - Continue home amiodarone 200mg PO daily  - agree with gentle hydration, monitor Cr closely - tolerating metoprolol tartrate 25 bid, transition to toprol when tolerating PO  - hold losartan, hydral prn    - hold diuretics in the setting of fluid losses   - Continue home amiodarone 200mg PO daily  - agree with gentle hydration, monitor Cr closely  - CVL for CVP monitoring

## 2021-07-14 NOTE — PROGRESS NOTE ADULT - ATTENDING COMMENTS
overall improved.   smiling.   NG tube replaced. feeds reinitiated.   on trach collar, copeous secretions- clear.   decreased diahrea.   subsequent to volume losses yesterday, initiated on LR resuccitation 75/hr.   meds: vanco po, heparin (50), amnio 200, clonipine, lopressor 25 bid.   LVAD: 9200, 5.7, PI 4.7,   HR 90, MAPs 70-80. afebrile.   I/O: +1.6.   CXR: stable   07-14    134<L>  |  91<L>  |  50<H>  ----------------------------<  101<H>  4.1   |  28  |  2.00<H>  Cr 2.0, .87  Ca    10.5      14 Jul 2021 00:43  Phos  4.1     07-14  Mg     2.4     07-14  TPro  9.1<H>  /  Alb  5.5<H>  /  TBili  0.8  /  DBili  x   /  AST  16  /  ALT  10  /  AlkPhos  81  07-14                        8.9    19.79 )-----------( 190      ( 14 Jul 2021 00:43 )             28.6   WBC 19.7, 12.3, 10.9, 12.1  clinically better today.   however, RASHAWN and elevated WBC.   feeds related diahrea vs ongoing cdiff.   Will ask Dr Prater to give input on need for further imaging and consideration of fecal transplant.   Zi Werner

## 2021-07-14 NOTE — PROGRESS NOTE ADULT - SUBJECTIVE AND OBJECTIVE BOX
RICKY HAMPTON  MRN-[id number]  Patient is a 66y old  Male who presents with a chief complaint of Anemia, Supratherapeutic INR, Dark Stools (2021 20:23)    HPI:  65M PMH ACC/AHA stage D HF due to NICM HM2 LVAD , TV annuloplasty ring 17 as destination therapy due to severe peripheral artery disease with significant stenosis  SIADH, Depression, CKD-3 with hyperkalemia, past E. coli UTIs, driveline drainage (21) and COVID-19 (back in 2020)  He was recently seen in clinic where he complained of abdominal pain and dark stools w constipation back in May. He presents to [facility] ER today weakness and fatigue, moderate and + Black stools for three days, on coumadin secondary to warfarin use in the setting of an LVAD. Patient has required transfusions for GIB in the past. Mostly recently back in 2021 pt had anemia with dark stools. No interventions was done at that time. However Last Endoscopy was done in 2020 (negative). Today labs show patient is anemic with H/H of 4.5/16.3,. INR is 8.84 MAP in the 90s, Temp 35.1. He denies any chest pain, shortness of breath, dizziness, abd pain, nausea or vomiting.       (2021 16:57)      Surgery/Hospital Course:   admit for melena w/ anemia, INR 8.84   6/15 Capsul study (+) for small bowel bleed, balloon endoscopy (old blood in prox ileum); post EGD - septic w/ L opacity, re-intubated for concern for aspiration, TTE (Mod MR, decrease biV w/ interventricular septum boweing towards R)   bronch    TC    CT C/A/P: Fluid filled colon which may be 2/2 rapid transit. Small bilateral pleffs with associates. Compressive atelectasis New ISABELLE & LLL  parenchymal opacities, suspicious for pneumonia. 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    #8 Shiley trach at bedside    CPAP trials    LVAD speed increased to 9200   Bronch; Central line dc'ed    Today:    ICU Vital Signs Last 24 Hrs  T(C): 36.2 (2021 03:00), Max: 36.9 (2021 08:00)  T(F): 97.2 (2021 03:00), Max: 98.4 (2021 08:00)  HR: 97 (2021 06:00) (96 - 130)  BP: --  BP(mean): 84 (2021 06:00) (60 - 84)  ABP: 84/64 (2021 06:00) (67/50 - 98/74)  ABP(mean): 72 (2021 06:00) (56 - 89)  RR: 25 (2021 06:00) (16 - 41)  SpO2: 100% (2021 06:00) (97% - 100%)      Physical Exam:  Gen:  Awake, alert   CNS: non focal 	  Neck: no JVD, +Trach   RES : clear , no wheezing              CVS: Regular  rhythm. Normal S1/S2  Abd: Soft, non-distended. Bowel sounds present.  Skin: No rash.  Ext:  no edema    ============================I/O===========================   I&O's Detail    2021 07:01  -  2021 07:00  --------------------------------------------------------  IN:    Enteral Tube Flush: 120 mL    Heparin: 240 mL    IV PiggyBack: 100 mL    Miscellaneous Tube Feedin mL  Total IN: 1600 mL    OUT:    Emesis (mL): 250 mL    Voided (mL): 1325 mL  Total OUT: 1575 mL    Total NET: 25 mL      2021 07:01  -  2021 06:43  --------------------------------------------------------  IN:    Albumin 5%  - 250 mL: 1250 mL    Albumin 5% - 50 mL: 100 mL    Enteral Tube Flush: 70 mL    Heparin: 220 mL    IV PiggyBack: 100 mL    Miscellaneous Tube Feedin mL  Total IN: 1950 mL    OUT:    Voided (mL): 300 mL  Total OUT: 300 mL    Total NET: 1650 mL        ============================ LABS =========================                        8.9    19.79 )-----------( 190      ( 2021 00:43 )             28.6     07-14    134<L>  |  91<L>  |  50<H>  ----------------------------<  101<H>  4.1   |  28  |  2.00<H>    Ca    10.5      2021 00:43  Phos  4.1     -  Mg     2.4     -14    TPro  9.1<H>  /  Alb  5.5<H>  /  TBili  0.8  /  DBili  x   /  AST  16  /  ALT  10  /  AlkPhos  81  -14    LIVER FUNCTIONS - ( 2021 00:43 )  Alb: 5.5 g/dL / Pro: 9.1 g/dL / ALK PHOS: 81 U/L / ALT: 10 U/L / AST: 16 U/L / GGT: x           PT/INR - ( 2021 16:08 )   PT: 20.2 sec;   INR: 1.73 ratio         PTT - ( 2021 00:43 )  PTT:50.8 sec  ABG - ( 2021 00:21 )  pH, Arterial: 7.39  pH, Blood: x     /  pCO2: 52    /  pO2: 141   / HCO3: 31    / Base Excess: 5.9   /  SaO2: 99                  ======================Micro/Rad/Cardio=================  Culture: Reviewed   CXR: Reviewed  Echo: Reviewed  ======================================================  PAST MEDICAL & SURGICAL HISTORY:  CHF (congestive heart failure)    CAD (coronary artery disease)    Depression    Pleural effusion    History of 2019 novel coronavirus disease (COVID-19)  2020    Hemorrhoids    Bleeding hemorrhoids    Peripheral arterial disease    Claudication    BPH with urinary obstruction    ACC/AHA stage D systolic heart failure    Anticoagulation goal of INR 2.0 to 2.5    Falls    Clavicle fracture    CKD (chronic kidney disease), stage III    Iron deficiency anemia    H/O epistaxis    Vertigo    GI bleed    S/P TVR (tricuspid valve repair)    S/P ventricular assist device    S/P endoscopy      ====================ASSESSMENT ==============    Stage D Nonischemic Cardiomyopathy, Status Post HM2 on 2017   Recent driveline infection on 2021   Cardiogenic shock  Hemodynamic instability   Acute hypoxemic respiratory failure  GI bleed , Status Post Enteroscopy   Anemia, in setting of melena   Chronic Kidney Disease  Stress hyperglycemia   C.diff positive on    Hypovolemic shock  Hyponatremia   Leukocytosis      Plan:  ====================== NEUROLOGY=====================  Nonfocal, continue to monitor neuro status   Tylenol PRN for fevers   Klonopin for agitation   PT/ambulation as tolerated     acetaminophen    Suspension .. 650 milliGRAM(s) Oral every 6 hours PRN Temp greater or equal to 38C (100.4F)  clonazePAM  Tablet 0.5 milliGRAM(s) Oral at bedtime  clonazePAM  Tablet 0.25 milliGRAM(s) Oral every 24 hours    ==================== RESPIRATORY======================  Acute hypoxemic resp failure s/p #8 shiley trach on on ; TC since , continue as tolerated.   Continue close monitoring of respiratory rate and breathing pattern, following of ABG’s with A-line monitoring, continuous pulse oximetry monitoring.     ====================CARDIOVASCULAR==================  Stage D Nonischemic Cardiomyopathy, Status Post HM2 on 2017; LVAD settings 9200 and flow 5.4   Echo 6/15: severe global Left ventricular systolic dysfunction with HM2 in place, decreased Right ventricular systolic function, interventricular septums bows slightly to right, otherwise grossly similar to prior.   Invasive hemodynamic monitoring, assess perfusion indices.   Continue rate control with Lopressor and Amiodarone   Pressor support with Hydralazine       aMIOdarone    Tablet 200 milliGRAM(s) Oral daily  hydrALAZINE 25 milliGRAM(s) Oral every 8 hours PRN MAP greater than 85  metoprolol tartrate 25 milliGRAM(s) Oral every 12 hours  ===================HEMATOLOGIC/ONC ===================  Anemia due to acute blood loss associated with small bowel bleed; monitor H&H/Plts  Continue AC therapy with IV Heparin for LVAD circuit and VTE prophylaxis       heparin  Infusion 1000 Unit(s)/Hr (10 mL/Hr) IV Continuous <Continuous>  ===================== RENAL =========================  Continue monitoring urine output, I&OS, BUN/Cr   Replete lytes PRN. Keep K> 4 and Mg >2      ==================== GASTROINTESTINAL===================  Tolerating TF, Jevity @ goal 60 cc/hr.   Continue Protonix for stress ulcer prophylaxis.     lactated ringers. 1000 milliLiter(s) (75 mL/Hr) IV Continuous <Continuous>  pantoprazole   Suspension 40 milliGRAM(s) Enteral Tube daily  =======================    ENDOCRINE  =====================  A1c 5.6   Hx of DMT2, glycemic control with Admelog sliding scale   Monitor blood glucose levels.     insulin lispro (ADMELOG) corrective regimen sliding scale   SubCutaneous every 6 hours  ========================INFECTIOUS DISEASE================  Afebrile, Leukocytosis w/ WBC rising from 12.34 --> 19.79  Monitor temperature and trend WBC.   : C.diff positive, will continue Vancomycin for C.diff     vancomycin    Solution 125 milliGRAM(s) Oral every 12 hours          I have spent 35 minutes providing acute care for this critically ill patient     Patient requires continuous monitoring with bedside rhythm monitoring, pulse ox monitoring, and intermittent blood gas analysis. Care plan discussed with ICU care team. Patient remained critical and at risk for life threatening decompensation.     By signing my name below, I, Emily Roa, attest that this documentation has been prepared under the direction and in the presence of Kota Thomas MD   Electronically signed: Cesia Cottrell, 21 @ 06:43    I, Kota Thomas, personally performed the services described in this documentation. all medical record entries made by the scribmel were at my direction and in my presence. I have reviewed the chart and agree that the record reflects my personal performance and is accurate and complete  Electronically signed: Kota Thomas MD        RICKY HAMPTON  MRN-[id number]  Patient is a 66y old  Male who presents with a chief complaint of Anemia, Supratherapeutic INR, Dark Stools (2021 20:23)    HPI:  65M PMH ACC/AHA stage D HF due to NICM HM2 LVAD , TV annuloplasty ring 17 as destination therapy due to severe peripheral artery disease with significant stenosis  SIADH, Depression, CKD-3 with hyperkalemia, past E. coli UTIs, driveline drainage (21) and COVID-19 (back in 2020)  He was recently seen in clinic where he complained of abdominal pain and dark stools w constipation back in May. He presents to [facility] ER today weakness and fatigue, moderate and + Black stools for three days, on coumadin secondary to warfarin use in the setting of an LVAD. Patient has required transfusions for GIB in the past. Mostly recently back in 2021 pt had anemia with dark stools. No interventions was done at that time. However Last Endoscopy was done in 2020 (negative). Today labs show patient is anemic with H/H of 4.5/16.3,. INR is 8.84 MAP in the 90s, Temp 35.1. He denies any chest pain, shortness of breath, dizziness, abd pain, nausea or vomiting.       (2021 16:57)      Surgery/Hospital Course:   admit for melena w/ anemia, INR 8.84   6/15 Capsul study (+) for small bowel bleed, balloon endoscopy (old blood in prox ileum); post EGD - septic w/ L opacity, re-intubated for concern for aspiration, TTE (Mod MR, decrease biV w/ interventricular septum boweing towards R)   bronch    TC    CT C/A/P: Fluid filled colon which may be 2/2 rapid transit. Small bilateral pleffs with associates. Compressive atelectasis New ISABELLE & LLL  parenchymal opacities, suspicious for pneumonia. 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    #8 Ricky trach at bedside    CPAP trials    LVAD speed increased to 9200   Bronch; Central line dc'ed    Today:  Had one BM overnight. Continue to wean trach as pt tolerates.     ICU Vital Signs Last 24 Hrs  T(C): 36.2 (2021 03:00), Max: 36.9 (2021 08:00)  T(F): 97.2 (2021 03:00), Max: 98.4 (2021 08:00)  HR: 97 (2021 06:00) (96 - 130)  BP: --  BP(mean): 84 (2021 06:00) (60 - 84)  ABP: 84/64 (2021 06:00) (67/50 - 98/74)  ABP(mean): 72 (2021 06:00) (56 - 89)  RR: 25 (2021 06:00) (16 - 41)  SpO2: 100% (2021 06:00) (97% - 100%)      Physical Exam:  Gen:  Awake, alert   CNS: non focal 	  Neck: no JVD, +Trach   RES : clear , no wheezing              CVS: Regular  rhythm. Normal S1/S2  Abd: Soft, non-distended. Bowel sounds present.  Skin: No rash.  Ext:  no edema    ============================I/O===========================   I&O's Detail    2021 07:01  -  2021 07:00  --------------------------------------------------------  IN:    Enteral Tube Flush: 120 mL    Heparin: 240 mL    IV PiggyBack: 100 mL    Miscellaneous Tube Feedin mL  Total IN: 1600 mL    OUT:    Emesis (mL): 250 mL    Voided (mL): 1325 mL  Total OUT: 1575 mL    Total NET: 25 mL      2021 07:01  -  2021 06:43  --------------------------------------------------------  IN:    Albumin 5%  - 250 mL: 1250 mL    Albumin 5% - 50 mL: 100 mL    Enteral Tube Flush: 70 mL    Heparin: 220 mL    IV PiggyBack: 100 mL    Miscellaneous Tube Feedin mL  Total IN: 1950 mL    OUT:    Voided (mL): 300 mL  Total OUT: 300 mL    Total NET: 1650 mL        ============================ LABS =========================                        8.9    19.79 )-----------( 190      ( 2021 00:43 )             28.6     07-14    134<L>  |  91<L>  |  50<H>  ----------------------------<  101<H>  4.1   |  28  |  2.00<H>    Ca    10.5      2021 00:43  Phos  4.1     07-14  Mg     2.4     07-14    TPro  9.1<H>  /  Alb  5.5<H>  /  TBili  0.8  /  DBili  x   /  AST  16  /  ALT  10  /  AlkPhos  81  07-14    LIVER FUNCTIONS - ( 2021 00:43 )  Alb: 5.5 g/dL / Pro: 9.1 g/dL / ALK PHOS: 81 U/L / ALT: 10 U/L / AST: 16 U/L / GGT: x           PT/INR - ( 2021 16:08 )   PT: 20.2 sec;   INR: 1.73 ratio         PTT - ( 2021 00:43 )  PTT:50.8 sec  ABG - ( 2021 00:21 )  pH, Arterial: 7.39  pH, Blood: x     /  pCO2: 52    /  pO2: 141   / HCO3: 31    / Base Excess: 5.9   /  SaO2: 99                  ======================Micro/Rad/Cardio=================  Culture: Reviewed   CXR: Reviewed  Echo: Reviewed  ======================================================  PAST MEDICAL & SURGICAL HISTORY:  CHF (congestive heart failure)    CAD (coronary artery disease)    Depression    Pleural effusion    History of 2019 novel coronavirus disease (COVID-19)  2020    Hemorrhoids    Bleeding hemorrhoids    Peripheral arterial disease    Claudication    BPH with urinary obstruction    ACC/AHA stage D systolic heart failure    Anticoagulation goal of INR 2.0 to 2.5    Falls    Clavicle fracture    CKD (chronic kidney disease), stage III    Iron deficiency anemia    H/O epistaxis    Vertigo    GI bleed    S/P TVR (tricuspid valve repair)    S/P ventricular assist device    S/P endoscopy      ====================ASSESSMENT ==============    Stage D Nonischemic Cardiomyopathy, Status Post HM2 on 2017   Recent driveline infection on 2021   Cardiogenic shock  Hemodynamic instability   Acute hypoxemic respiratory failure  GI bleed , Status Post Enteroscopy   Anemia, in setting of melena   Chronic Kidney Disease  Stress hyperglycemia   C.diff positive on    Hypovolemic shock  Hyponatremia   Leukocytosis      Plan:  ====================== NEUROLOGY=====================  Nonfocal, continue to monitor neuro status   Tylenol PRN for fevers   Klonopin for agitation   PT/ambulation as tolerated     acetaminophen    Suspension .. 650 milliGRAM(s) Oral every 6 hours PRN Temp greater or equal to 38C (100.4F)  clonazePAM  Tablet 0.5 milliGRAM(s) Oral at bedtime  clonazePAM  Tablet 0.25 milliGRAM(s) Oral every 24 hours    ==================== RESPIRATORY======================  Acute hypoxemic resp failure s/p #8 shiley trach on on ; TC since , continue as tolerated.   Continue close monitoring of respiratory rate and breathing pattern, following of ABG’s with A-line monitoring, continuous pulse oximetry monitoring.     ====================CARDIOVASCULAR==================  Stage D Nonischemic Cardiomyopathy, Status Post HM2 on 2017; LVAD settings 9200 and flow 5.4   Echo 6/15: severe global Left ventricular systolic dysfunction with HM2 in place, decreased Right ventricular systolic function, interventricular septums bows slightly to right, otherwise grossly similar to prior.   Invasive hemodynamic monitoring, assess perfusion indices.   Continue rate control with Lopressor and Amiodarone   Pressor support with Hydralazine       aMIOdarone    Tablet 200 milliGRAM(s) Oral daily  hydrALAZINE 25 milliGRAM(s) Oral every 8 hours PRN MAP greater than 85  metoprolol tartrate 25 milliGRAM(s) Oral every 12 hours  ===================HEMATOLOGIC/ONC ===================  Anemia due to acute blood loss associated with small bowel bleed; monitor H&H/Plts  Continue AC therapy with IV Heparin for LVAD circuit and VTE prophylaxis       heparin  Infusion 1000 Unit(s)/Hr (10 mL/Hr) IV Continuous <Continuous>  ===================== RENAL =========================  Continue monitoring urine output, I&OS, BUN/Cr   Replete lytes PRN. Keep K> 4 and Mg >2      ==================== GASTROINTESTINAL===================  Tolerating TF, Jevity @ goal 60 cc/hr.   Continue Protonix for stress ulcer prophylaxis.     lactated ringers. 1000 milliLiter(s) (75 mL/Hr) IV Continuous <Continuous>  pantoprazole   Suspension 40 milliGRAM(s) Enteral Tube daily  =======================    ENDOCRINE  =====================  A1c 5.6   Hx of DMT2, glycemic control with Admelog sliding scale   Monitor blood glucose levels.     insulin lispro (ADMELOG) corrective regimen sliding scale   SubCutaneous every 6 hours  ========================INFECTIOUS DISEASE================  Afebrile, Leukocytosis w/ WBC rising from 12.34 --> 19.79  Monitor temperature and trend WBC.   : C.diff positive, will continue Vancomycin for C.diff     vancomycin    Solution 125 milliGRAM(s) Oral every 12 hours          I have spent 35 minutes providing acute care for this critically ill patient     Patient requires continuous monitoring with bedside rhythm monitoring, pulse ox monitoring, and intermittent blood gas analysis. Care plan discussed with ICU care team. Patient remained critical and at risk for life threatening decompensation.     By signing my name below, I, Emily Roa, attest that this documentation has been prepared under the direction and in the presence of Kota Thomas MD   Electronically signed: Cesia Cottrell, 21 @ 06:43    I, Kota Thomas, personally performed the services described in this documentation. all medical record entries made by the luisibmel were at my direction and in my presence. I have reviewed the chart and agree that the record reflects my personal performance and is accurate and complete  Electronically signed: Kota Thomas MD

## 2021-07-14 NOTE — PROGRESS NOTE ADULT - SUBJECTIVE AND OBJECTIVE BOX
Patient seen and examined at bedside.    Overnight Events: Soft BPs overnight, removed NGT so not receiving feeds ( now replaced ), held diuretic and losartan yesterday in s/o presumed fluid down, now w/ new RASHAWN likely in this setting, receiving IV hydration.     Review Of Systems: No chest pain, shortness of breath, or palpitations            Current Meds:  acetaminophen    Suspension .. 650 milliGRAM(s) Oral every 6 hours PRN  aMIOdarone    Tablet 200 milliGRAM(s) Oral daily  chlorhexidine 2% Cloths 1 Application(s) Topical <User Schedule>  clonazePAM  Tablet 0.5 milliGRAM(s) Oral at bedtime  clonazePAM  Tablet 0.25 milliGRAM(s) Oral every 24 hours  heparin  Infusion 1000 Unit(s)/Hr IV Continuous <Continuous>  hydrALAZINE 25 milliGRAM(s) Oral every 8 hours PRN  insulin lispro (ADMELOG) corrective regimen sliding scale   SubCutaneous every 6 hours  lactated ringers. 1000 milliLiter(s) IV Continuous <Continuous>  metoprolol tartrate 25 milliGRAM(s) Oral every 12 hours  pantoprazole   Suspension 40 milliGRAM(s) Enteral Tube daily  vancomycin    Solution 125 milliGRAM(s) Oral every 12 hours      Vitals:  T(F): 97.2 (07-14), Max: 98.4 (07-13)  HR: 96 (07-14) (96 - 130)  BP: --  RR: 22 (07-14)  SpO2: 100% (07-14)  I&O's Summary    13 Jul 2021 07:01  -  14 Jul 2021 07:00  --------------------------------------------------------  IN: 2090 mL / OUT: 500 mL / NET: 1590 mL        Physical Exam:  Appearance: No acute distress; well appearing  Cardiovascular: RRR, S1, S2, no murmurs, rubs, or gallops; no edema; no JVD  Respiratory: Clear to auscultation bilaterally  Gastrointestinal: soft, non-tender, non-distended with normal bowel sounds  Musculoskeletal: No clubbing; no joint deformity   Psychiatry: AAOx3, mood & affect appropriate  Skin: No rashes, ecchymoses, or cyanosis                          8.9    19.79 )-----------( 190      ( 14 Jul 2021 00:43 )             28.6     07-14    134<L>  |  91<L>  |  50<H>  ----------------------------<  101<H>  4.1   |  28  |  2.00<H>    Ca    10.5      14 Jul 2021 00:43  Phos  4.1     07-14  Mg     2.4     07-14    TPro  9.1<H>  /  Alb  5.5<H>  /  TBili  0.8  /  DBili  x   /  AST  16  /  ALT  10  /  AlkPhos  81  07-14    PT/INR - ( 13 Jul 2021 16:08 )   PT: 20.2 sec;   INR: 1.73 ratio         PTT - ( 14 Jul 2021 00:43 )  PTT:50.8 sec     Patient seen and examined at bedside.    Overnight Events: Soft BPs overnight, removed NGT so not receiving feeds ( now replaced ), held diuretic and losartan yesterday in s/o presumed fluid down, now w/ new RASHAWN likely in this setting, receiving IV hydration.     Review Of Systems: No chest pain, shortness of breath, or palpitations            Current Meds:  acetaminophen    Suspension .. 650 milliGRAM(s) Oral every 6 hours PRN  aMIOdarone    Tablet 200 milliGRAM(s) Oral daily  chlorhexidine 2% Cloths 1 Application(s) Topical <User Schedule>  clonazePAM  Tablet 0.5 milliGRAM(s) Oral at bedtime  clonazePAM  Tablet 0.25 milliGRAM(s) Oral every 24 hours  heparin  Infusion 1000 Unit(s)/Hr IV Continuous <Continuous>  hydrALAZINE 25 milliGRAM(s) Oral every 8 hours PRN  insulin lispro (ADMELOG) corrective regimen sliding scale   SubCutaneous every 6 hours  lactated ringers. 1000 milliLiter(s) IV Continuous <Continuous>  metoprolol tartrate 25 milliGRAM(s) Oral every 12 hours  pantoprazole   Suspension 40 milliGRAM(s) Enteral Tube daily  vancomycin    Solution 125 milliGRAM(s) Oral every 12 hours      Vitals:  T(F): 97.2 (07-14), Max: 98.4 (07-13)  HR: 96 (07-14) (96 - 130)  BP: --  RR: 22 (07-14)  SpO2: 100% (07-14)  I&O's Summary    13 Jul 2021 07:01  -  14 Jul 2021 07:00  --------------------------------------------------------  IN: 2090 mL / OUT: 500 mL / NET: 1590 mL        Physical Exam:  Appearance: No acute distress; well appearing  Cardiovascular: RRR, S1, S2, no murmurs, rubs, or gallops; no edema; no JVD  Respiratory: Clear to auscultation bilaterally  Gastrointestinal: soft, non-tender, non-distended with normal bowel sounds  Musculoskeletal: No clubbing; no joint deformity   Psychiatry: AAOx3, mood & affect appropriate  Skin: No rashes, ecchymoses, or cyanosis                          8.9    19.79 )-----------( 190      ( 14 Jul 2021 00:43 )             28.6     07-14    134<L>  |  91<L>  |  50<H>  ----------------------------<  101<H>  4.1   |  28  |  2.00<H>    Ca    10.5      14 Jul 2021 00:43  Phos  4.1     07-14  Mg     2.4     07-14    TPro  9.1<H>  /  Alb  5.5<H>  /  TBili  0.8  /  DBili  x   /  AST  16  /  ALT  10  /  AlkPhos  81  07-14    PT/INR - ( 13 Jul 2021 16:08 )   PT: 20.2 sec;   INR: 1.73 ratio      PTT - ( 14 Jul 2021 00:43 )  PTT:50.8 sec

## 2021-07-14 NOTE — PROGRESS NOTE ADULT - SUBJECTIVE AND OBJECTIVE BOX
INFECTIOUS DISEASES FOLLOW UP-- Anitra Prater  [phone]    This is a follow up note for this  66yMale with  Anemia  increasing leukocytosis        ROS:  CONSTITUTIONAL: awake and alert clearly told me he 'didnt' want to bothered and poked     Allergies    No Known Allergies    Intolerances        ANTIBIOTICS/RELEVANT:  antimicrobials  vancomycin    Solution 125 milliGRAM(s) Oral every 12 hours    immunologic:    OTHER:  acetaminophen    Suspension .. 650 milliGRAM(s) Oral every 6 hours PRN  aMIOdarone    Tablet 200 milliGRAM(s) Oral daily  chlorhexidine 2% Cloths 1 Application(s) Topical <User Schedule>  clonazePAM  Tablet 0.5 milliGRAM(s) Oral at bedtime  clonazePAM  Tablet 0.25 milliGRAM(s) Oral every 24 hours  heparin  Infusion 1000 Unit(s)/Hr IV Continuous <Continuous>  hydrALAZINE 25 milliGRAM(s) Oral every 8 hours PRN  insulin lispro (ADMELOG) corrective regimen sliding scale   SubCutaneous every 6 hours  lactated ringers. 1000 milliLiter(s) IV Continuous <Continuous>  metoprolol tartrate 25 milliGRAM(s) Oral every 12 hours  pantoprazole   Suspension 40 milliGRAM(s) Enteral Tube daily  warfarin 5 milliGRAM(s) Oral once      Objective:  Vital Signs Last 24 Hrs  T(C): 36.4 (14 Jul 2021 12:00), Max: 36.9 (13 Jul 2021 20:00)  T(F): 97.6 (14 Jul 2021 12:00), Max: 98.4 (13 Jul 2021 20:00)  HR: 72 (14 Jul 2021 17:00) (72 - 118)  BP: --  BP(mean): 86 (14 Jul 2021 15:00) (70 - 86)  RR: 26 (14 Jul 2021 17:00) (16 - 41)  SpO2: 100% (14 Jul 2021 17:00) (98% - 100%)    PHYSICAL EXAM:  Constitutional:no acute distress  Eyes:ALONSO, EOMI  Ear/Nose/Throat: no oral lesions, trach site less secretions no bleeding  new CVC on left side	  Respiratory: audible BS bilat  Cardiovascular: S1S2 VAD sounds  Gastrointestinal:soft, (+) BS, but still protuberant  VAD site no drainage on dressing  Extremities:no e/e/c  No Lymphadenopathy  IV sites not inflammed.    LABS:                        8.9    19.79 )-----------( 190      ( 14 Jul 2021 00:43 )             28.6     07-14    135  |  94<L>  |  45<H>  ----------------------------<  130<H>  4.4   |  30  |  1.28    Ca    10.4      14 Jul 2021 14:19  Phos  4.1     07-14  Mg     2.4     07-14    TPro  9.1<H>  /  Alb  5.5<H>  /  TBili  0.8  /  DBili  x   /  AST  16  /  ALT  10  /  AlkPhos  81  07-14    PT/INR - ( 14 Jul 2021 14:19 )   PT: 22.6 sec;   INR: 1.94 ratio         PTT - ( 14 Jul 2021 00:43 )  PTT:50.8 sec      MICROBIOLOGY:            RECENT CULTURES:  07-09 @ 17:39  .Blood Blood-Venous  --  --  --    No Growth Final  --      RADIOLOGY & ADDITIONAL STUDIES:    < from: Xray Chest 1 View- PORTABLE-Routine (Xray Chest 1 View- PORTABLE-Routine in AM.) (07.14.21 @ 01:48) >    The heart is slightly enlarged. The lungs appear to be clear. No pleural effusion. No pneumothorax. All life supporting devices are in good position and unchanged when compared to previous study done on July 13, 2021 at 8:43 PM. No pneumothorax.    < end of copied text >

## 2021-07-14 NOTE — PROGRESS NOTE ADULT - ASSESSMENT
imp/rx:  Suspected aspiration event at time of Endoscopic procedure with subsequent development of C.diff diarrhea  Now with increasing leukocytosis and gram + cocci in clusters from blood culture sent 6/26 with organism ID/sensitivity pending  Started IV Vancomycin  Vanco CLAU=4 is borderline  Will change antibiotics to Cefazolin  Would change CVC lines as feasible   repeat blood cultures 6/29 x2 sets are negative  completed therapy with Cefazolin    Increasing Leukocytosis:  12k--->19k  CVC lines changed  blood cultures sent  line tip sent for culture  will hold off on restarting antibiotics unless his condition deteriorates or cultures sent turn positive because of his ongoing C.diff diarrhea        Clostridiodes difficile :  Abdomen somewhat distended- but soft- loose stool again noted  WBC within the normal range  Continue Vanco oral but reduced to tapering dose of 125mg bid  - diarrhea improved when feeds were  held making it likely that the enteral feeds are also contributing to the ongoing diarrhea  as stool is again mucus and increasing will try to obtain FMT product from [facility]            Morris Prater MD  [phone]  After 5pm/weekends [phone]

## 2021-07-14 NOTE — PROGRESS NOTE ADULT - ASSESSMENT
65 YO M with a history of stage D NICM s/p HM2 on 9/2017 as DT (due to severe PAD) with TV ring, prior COVID-19 infection 4/2020, recurrent syncope post LVAD s/p ILR, and chronic abdominal pain with prior negative workup who was admitted with symptomatic anemia with Hgb 4.5 in setting of INR 8.8 without hemodynamic instability. He was transfused and underwent VCE which showed active bleeding in the mid small bowel but subsequent enteroscopy 6/15 did not reveal any active bleeding. He acutely decompensated after procedure with fever/hypertension, low flow alarms, and pulmonary infiltrate with hypoxia requiring intubation from probable aspiration PNA. His LDH is normal and there are no signs of overt LVAD dysfunction on echo though signs of insufficiently unloaded ventricle for which his speed has been increased. Course notable for inability to wean ventilator with persistent secretions for which he underwent tracheostomy as well as acute c diff colitis.     Primary goals at this time is to progress towards tracheostomy decannulation and continue physical therapy for severe deconditioning. He is tolerating increasing duration of trach collar.

## 2021-07-15 NOTE — PROGRESS NOTE ADULT - SUBJECTIVE AND OBJECTIVE BOX
INFECTIOUS DISEASES FOLLOW UP-- Anitra Prater  [phone]    This is a follow up note for this  66yMale with  Anemia  LVAD  ventilatory failure s/p trach  CDI diarrhea  one episode of loose stool by noon today        ROS:  CONSTITUTIONAL: awake and alert  better spirits today    Allergies    No Known Allergies    Intolerances        ANTIBIOTICS/RELEVANT:  antimicrobials  vancomycin    Solution 125 milliGRAM(s) Oral every 12 hours    immunologic:    OTHER:  acetaminophen    Suspension .. 650 milliGRAM(s) Oral every 6 hours PRN  acetaminophen    Suspension .. 650 milliGRAM(s) Oral every 6 hours PRN  aMIOdarone    Tablet 200 milliGRAM(s) Oral daily  chlorhexidine 2% Cloths 1 Application(s) Topical <User Schedule>  clonazePAM  Tablet 0.5 milliGRAM(s) Oral at bedtime  clonazePAM  Tablet 0.25 milliGRAM(s) Oral every 24 hours  heparin  Infusion 1000 Unit(s)/Hr IV Continuous <Continuous>  hydrALAZINE 25 milliGRAM(s) Oral every 8 hours PRN  insulin lispro (ADMELOG) corrective regimen sliding scale   SubCutaneous every 6 hours  lactated ringers. 1000 milliLiter(s) IV Continuous <Continuous>  metoprolol tartrate 25 milliGRAM(s) Oral every 12 hours  pantoprazole   Suspension 40 milliGRAM(s) Enteral Tube daily  psyllium Powder 1 Packet(s) Oral daily  warfarin 5 milliGRAM(s) Oral once      Objective:  Vital Signs Last 24 Hrs  T(C): 36.7 (15 Jul 2021 12:00), Max: 36.8 (15 Jul 2021 08:00)  T(F): 98.1 (15 Jul 2021 12:00), Max: 98.2 (15 Jul 2021 08:00)  HR: 103 (15 Jul 2021 18:00) (83 - 106)  BP: --  BP(mean): 82 (2021 20:00) (82 - 82)  RR: 16 (15 Jul 2021 18:00) (14 - 61)  SpO2: 100% (15 Jul 2021 18:00) (97% - 100%)    PHYSICAL EXAM:  Constitutional:no acute distress  Eyes:ALONSO, EOMI  Ear/Nose/Throat: no oral lesions, trach site with pale yellow secretions	  Respiratory: coarse aduible bilat  Cardiovascular: [id number] sounds  Gastrointestinal:soft, (+) BS, no tenderness  exit site dressing intact  Extremities:no e/e/c  No Lymphadenopathy  IV sites not inflammed.    LABS:                        8.8    9.83  )-----------( 186      ( 15 Jul 2021 00:37 )             29.5     07-15    133<L>  |  96  |  37<H>  ----------------------------<  122<H>  4.7   |  26  |  0.94    Ca    10.1      15 Jul 2021 00:37  Phos  2.8     07-15  Mg     2.4     07-15    TPro  8.6<H>  /  Alb  4.4  /  TBili  0.3  /  DBili  x   /  AST  22  /  ALT  10  /  AlkPhos  85  07-15    PT/INR - ( 15 Jul 2021 14:23 )   PT: 22.7 sec;   INR: 1.95 ratio         PTT - ( 15 Jul 2021 14:23 )  PTT:62.6 sec  Urinalysis Basic - ( 2021 20:32 )    Color: Yellow / Appearance: Clear / S.023 / pH: x  Gluc: x / Ketone: Negative  / Bili: Negative / Urobili: Negative   Blood: x / Protein: 30 mg/dL / Nitrite: Negative   Leuk Esterase: Negative / RBC: 1 /hpf / WBC 2 /HPF   Sq Epi: x / Non Sq Epi: 0 /hpf / Bacteria: Negative        MICROBIOLOGY:            RECENT CULTURES:   @ 17:39  .Blood Blood-Venous  --  --  --    No Growth Final  --      RADIOLOGY & ADDITIONAL STUDIES:    < from: Xray Chest 1 View- PORTABLE-Urgent (Xray Chest 1 View- PORTABLE-Urgent .) (21 @ 17:11) >  IMPRESSION:  No pneumothorax.    < end of copied text >

## 2021-07-15 NOTE — CONSULT NOTE ADULT - SUBJECTIVE AND OBJECTIVE BOX
Behavioral Cardiology Psychological Assessment     History of present illness:     Social history: , lives in his sister’s house in [place]. Has 3 sons (2 live in , 1 in [facility]). One of 3 siblings. Lives in his sister’s house in [place] (Cristina Rudolph [phone]), also in the home are niece (Celestina Rudolph-[place] [phone]) and nephew (Miller Rudolph).  Another sister lives close by in [place] and all other siblings live in [facility] which the family visit often.  Worked full time at [employer] in [place], stopped working due to heart disease. Education: high school graduate.     Substance use:   Tobacco: former smoker, 8-10 cigarettes/day for 30 years.   Alcohol: Past use of 3-4 drinks of Henessey 2x/week. None for past 4 years.   Drug: Denies any drug use.     Past psychiatric history: Denies     Psychological assessment: Seen sitting in chair in CTU with Trach collar. Speaking limited due to trach. Communicating mainly via mouthing words and using hand gestures, nodding yes/no. Maintained good eye contact and was engaged in the assessment. When asked where he thinks he is on the road to going home -in relation to when he came in- believes he’s more than [facility] there. Mood "better" but continues to experience periods of low mood and sadness in setting of extended hospitalization, abdominal discomfort, and need for tube feedings. Denies anhedonia. Denies SI/HI. Was receptive to discussing medication to treat periods of depression. Denies current symptoms of anxiety. Denies panic attacks. Taking Klonapin in morning and at bedtime. Sleep poor in hospital. Reports at home was sleeping well. Unsure why he’s having difficulty sleeping reports he just "wakes up." Reviewed with Mr. Quispe the team’s plan to continue working towards tracheostomy decannulation and ongoing physical therapy for severe deconditioning.  Was pleased with his ability to ambulate up and down hallway today 3x with PT. Provided encouragement to continue building on his physical activity. Reports he sits in chair for 5 hours during daytime. Receptive to support and feedback.    Mental status exam: Seen sitting in chair on CTU with Trach collar. Awake, oriented x3. Earlier in day ambulated up and down hallway 3x with PT. Thought process goal oriented. No evidence of any psychosis, kenan, delusions. No SI/HI. Mood "better" but does report periods of low mood, sadness in setting of hospitalization, abdominal discomfort, need for tube feedings. Denies current symptoms of anxiety. Insight and judgment adequate.     Impression: Mr. Quispe is a 66-year-old man s/p LVAD admitted on 6/14 with       Dx: Adjustment disorder with anxiety and depressed mood. F43.20 Systolic heart failure I50.2  LVAD Z95.811    Recommendations:   To promote sleep, keep lights and noise level to minimum during night.   Engage with patient as often as possible throughout day to encourage communication  Behavioral Cardiology will follow         Behavioral Cardiology Psychological Assessment     History of present illness: Mr. Quispe is a 66 year-old man with history of NICM s/p HM2 on 9/2017 as DT (due to severe PAD) with TV ring, prior COVID-19 infection 4/2020, recurrent syncope post LVAD s/p ILR, and chronic abdominal pain with prior negative workup who was admitted with symptomatic anemia with Hgb 4.5 in setting of INR 8.8 without hemodynamic instability. Testing showed active bleeding in the small bowel but subsequent enteroscopy 6/15 did not reveal any active bleeding. He acutely decompensated after procedure with fever/hypertension, low flow alarms, and pulmonary infiltrate with hypoxia requiring intubation from probable aspiration PNA.  Course notable for inability to wean ventilator with persistent secretions for which he underwent tracheostomy as well as acute c diff colitis.     Social history: , lives in his sister’s house in [place]. Has 3 sons (2 live in , 1 in [facility]). One of 3 siblings. Lives in his sister’s house in [place] (Cristina Rudolph [phone]), also in the home are niece [facility] [phone]) and nephew (Miller Rudolph).  Another sister lives close by in [place] and all other siblings live in [facility] which the family visit often.  Worked full time at [employer] in [place], stopped working due to heart disease. Education: high school graduate.     Substance use:   Tobacco: former smoker, 8-10 cigarettes/day for 30 years.   Alcohol: Past use of 3-4 drinks of Henessey 2x/week. None for past 4 years.   Drug: Denies any drug use.     Past psychiatric history: Denies     Psychological assessment: Seen sitting in chair in CTU with Trach collar. Speaking limited due to trach. Communicating mainly via mouthing words and using hand gestures, nodding yes/no. Maintained good eye contact and was engaged in the assessment. When asked where he thinks he is on the road to going home -in relation to when he came in- believes he’s more than [facility] there. Mood "better" but continues to experience periods of low mood and sadness in setting of extended hospitalization, abdominal discomfort, and need for tube feedings. Denies anhedonia. Denies SI/HI. Denies current symptoms of anxiety. Denies panic attacks. Taking Klonapin in morning and at bedtime. Sleep poor in hospital. Reports at home was sleeping well. Unsure why he’s having difficulty sleeping reports he just "wakes up."  Reviewed with Mr. Quispe the team’s goal of eventual tracheostomy decannulation and ongoing physical therapy for severe deconditioning. Mr. Quispe was pleased with his ability to ambulate up and down hallway today 3x with PT. Provided encouragement to continue building on his physical activity. Reports he sits in chair for 5 hours during daytime. Receptive to support and feedback.    Mental status exam: Seen sitting in chair on CTU with Trach collar. Awake, oriented x3. Earlier in day ambulated up and down hallway 3x with PT. Thought process goal oriented. No evidence of any psychosis, kenan, delusions. No SI/HI. Mood "better" but does report periods of low mood, sadness in setting of hospitalization, abdominal discomfort, need for tube feedings. Denies current symptoms of anxiety. Insight and judgment adequate.     Impression: Mr. Quispe is a 66-year-old man s/p LVAD admitted on 6/14 with symptomatic anemia with Hgb 4.5 in setting of INR 8.8 without hemodynamic instability. Complicated hospital course, now on trach collar and with severe deconditioning. Speech limited due to trach. Communicating mainly via mouthing words and using hand gestures, nodding yes/no. Engaged during assessment. A&)x3. Optimistic about his recovery and believes he is making progress. Mood improved but continues to endorse periods of low mood and sadness in setting of extended hospitalization, abdominal discomfort, and need for tube feedings. Denies anhedonia. Denies SI/HI. Denies current symptoms of anxiety. Denies panic attacks. Taking Klonapin in morning and at bedtime. Sleep poor in hospital. Reviewed teams goal of eventual tracheostomy decannulation and ongoing physical therapy for severe deconditioning. Mr. Quispe was pleased with his ability to ambulate up and down hallway today 3x with PT.     Dx: Adjustment disorder with anxiety and depressed mood. F43.20 Systolic heart failure I50.2  LVAD Z95.811    Recommendations:   To promote sleep, keep lights and noise level to minimum during night.   Engage with patient as often as possible throughout day to encourage communication  Behavioral Cardiology will follow      30 minutes spent on patient encounter   Jessica Hennessy, PhD  [phone]     Behavioral Cardiology Psychological Assessment     History of present illness: Mr. Quispe is a 66 year-old man with history of NICM s/p HM2 on 9/2017 as DT (due to severe PAD) with TV ring, prior COVID-19 infection 4/2020, recurrent syncope post LVAD s/p ILR, and chronic abdominal pain with prior negative workup who was admitted with symptomatic anemia with Hgb 4.5 in setting of INR 8.8 without hemodynamic instability. Testing showed active bleeding in the small bowel but subsequent enteroscopy 6/15 did not reveal any active bleeding. He acutely decompensated after procedure with fever/hypertension, low flow alarms, and pulmonary infiltrate with hypoxia requiring intubation from probable aspiration PNA.  Course notable for inability to wean ventilator with persistent secretions for which he underwent tracheostomy as well as acute c diff colitis.     Social history: , lives in his sister’s house in [place]. Has 3 sons (2 live in , 1 in [facility]). One of 3 siblings. Lives in his sister’s house in [place] (Cristina Rudolph [phone]), also in the home are niece [facility] [phone]) and nephew (Miller Rudolph).  Another sister lives close by in [place] and all other siblings live in [facility] which the family visit often.  Worked full time at [employer] in [place], stopped working due to heart disease. Education: high school graduate.     Substance use:   Tobacco: former smoker, 8-10 cigarettes/day for 30 years.   Alcohol: Past use of 3-4 drinks of Henessey 2x/week. None for past 4 years.   Drug: Denies any drug use.     Past psychiatric history: Denies     Psychological assessment: Seen sitting in chair in CTU with Trach collar. Speaking limited due to trach. Communicating mainly via mouthing words and using hand gestures, nodding yes/no. Maintained good eye contact and was engaged in the assessment. When asked where he thinks he is on the road to going home -in relation to when he came in- believes he’s more than [facility] there. Mood "better" but continues to experience periods of low mood and sadness in setting of extended hospitalization, abdominal discomfort, and need for tube feedings. Denies anhedonia. Denies SI/HI. Denies current symptoms of anxiety. Denies panic attacks. Taking Klonapin in morning and at bedtime. Sleep poor in hospital. Reports at home was sleeping well. Unsure why he’s having difficulty sleeping reports he just "wakes up."  Reviewed with Mr. Quispe the team’s goal of eventual tracheostomy decannulation and ongoing physical therapy for severe deconditioning. Mr. Quispe was pleased with his ability to ambulate up and down hallway today 3x with PT. Provided encouragement to continue building on his physical activity. Reports he sits in chair for 5 hours during daytime. Receptive to support and feedback.    Mental status exam: Seen sitting in chair on CTU with Trach collar. Awake, oriented x3. Earlier in day ambulated up and down hallway 3x with PT. Thought process goal oriented. No evidence of any psychosis, kenan, delusions. No SI/HI. Mood "better" but does report periods of low mood, sadness in setting of hospitalization, abdominal discomfort, need for tube feedings. Denies current symptoms of anxiety. Insight and judgment adequate.     Impression: Mr. Quispe is a 66-year-old man s/p LVAD admitted on 6/14 with symptomatic anemia with Hgb 4.5 in setting of INR 8.8 without hemodynamic instability. Complicated hospital course, now on trach collar and with severe deconditioning. Speech limited due to trach. Communicating mainly via mouthing words and using hand gestures, nodding yes/no. Engaged during assessment. A&)x3. Optimistic about his recovery and believes he is making progress. Mood improved but continues to endorse periods of low mood and sadness in setting of extended hospitalization, abdominal discomfort, and need for tube feedings. Denies anhedonia. Denies SI/HI. Open to speaking with team about medication for depression. Denies current symptoms of anxiety. Denies panic attacks. Taking Klonapin in morning and at bedtime. Sleep poor in hospital. Reviewed teams goal of eventual tracheostomy decannulation and ongoing physical therapy for severe deconditioning. Mr. Quispe was pleased with his ability to ambulate up and down hallway today 3x with PT.     Dx: Adjustment disorder with anxiety and depressed mood. F43.20 Systolic heart failure I50.2  LVAD Z95.811    Recommendations:   To promote sleep, keep lights and noise level to minimum during night  Engage with patient as often as possible throughout day to encourage communication  Behavioral Cardiology will follow      30 minutes spent on patient encounter   Jessica Hennessy, PhD  [phone]

## 2021-07-15 NOTE — CHART NOTE - NSCHARTNOTEFT_GEN_A_CORE
Seen for: nutrition follow up   Source: patient, RN, PA, EMR, HF rounds    Chart reviewed, events noted. 65 year old male with PMHx of NICM, s/p  HM2 LVAD in , on coumadin, CKD 3 presents with anemia, dark stools and supra therapeutic INR. S/p capsule study, endoscopy. Course c/b possible aspiration event. C-Diff +, being treated with vancomycin. S/p tracheostomy on . Intermittently on trach collar.    Diet, NPO with Tube Feed: Jevity 1.2 @ 60 ml/hr x 24 hours + Probiotic smoothie 2x/day     EN Order Provides: 1728 kcal, 80 g protein   (meeting 22 kcal/kg, 1.02 g/kg protein using dosing weight 78.5 kg)    EN provision per chart:   (7/15) at goal rate thus far  () 89% of goal   () 20% of goal; pt pulled NGT & refused   () 92% of goal  () 100% of goal  (7/10) 100% of goal   () 50% of goal    GI:  Diarrhea continues  - Bowel Regimen: none at this time  - C. Diff + (); remains on vancomycin   - Per rounds, unclear whether persistent diarrhea is related to C. Diff versus tube feeds  - Formula changed from Vital AF to Jevity 1.2 on  to provide more fiber to aid in stool bulking   - Per rounds, plan to add metamucil to aid in thickening stools    Stool count:   (7/15) 3x thus far  () 2x  () 3x  () none noted  () none noted  (7/10) 1x  () 8x  () none noted  () none noted  () 2x    Weight history:   Admission weight: 176.3 pounds / 80 kg (6/15)    Weights:   Daily Weight in k.5 (), Weight in k.4 (), Weight in k.2 (), Weight in k.1 (07-10)    MEDICATIONS  (STANDING):  aMIOdarone    Tablet  insulin lispro (ADMELOG) corrective regimen sliding scale  lactated ringers.  metoprolol tartrate  pantoprazole   Suspension  vancomycin    Solution    Pertinent Labs: 07-15 @ 00:37: Na 133<L>, BUN 37<H>, Cr 0.94, <H>, K+ 4.7, Phos 2.8, Mg 2.4, Alk Phos 85, ALT/SGPT 10, AST/SGOT 22, HbA1c --   @ 14:19: Na 135, BUN 45<H>, Cr 1.28, <H>, K+ 4.4, Phos --, Mg --, Alk Phos --, ALT/SGPT --, AST/SGOT --, HbA1c --    A1C with Estimated Average Glucose Result: 5.6 % (06-15-21 @ 02:42)    Finger Sticks:  POCT Blood Glucose.: 117 mg/dL (07-15 @ 05:02)  POCT Blood Glucose.: 148 mg/dL ( @ 18:03)    Triglycerides, Serum: 145 mg/dL (21 @ 00:53)  Triglycerides, Serum: 191 mg/dL (21 @ 00:36)  Triglycerides, Serum: 183 mg/dL (21 @ 00:43)  Triglycerides, Serum: 518 mg/dL (21 @ 09:02)    Skin per chart: no pressure injuries  Edema per chart: none noted    Estimated Nutrition Needs:   - Using dosing weight 78.5 kg, considering vent support  - Energy Needs (20-25 kcal/kg): 2486-2576 kcal/day  - Protein Needs (1.2-1.4 g/kg):     Previous Nutrition Diagnosis:  Acute moderate malnutrition  Nutrition diagnosis is ongoing, being addressed with tube feeds as tolerated    New Nutrition Diagnosis: n/a       Recommendations:      1) Continue Jevity 1.2 @ 60 ml/hr x 24 hours to meet 1728 kcal, 80 g protein (meeting 22 kcal/kg, 1.02 g/kg protein using dosing weight 78.5 kg).   2) Pending tolerance of formula change, when meets goal rate, add Prosource TF supplement 2x/day to provide 102 g protein to meet 1.3 g/kg/day  3) Continue Probiotic smoothie  4) Consider addition of Metamucil to aid in stool bulking. Ensure adequate administration/flushing to avoid NGT clogging    Continue monitoring and evaluating:   - Labs: blood glucose, electrolytes, renal indices  - GI function and bowel movements  - Nutritional intake, weight trends, skin integrity    RD remains available PRN and will follow up per protocol.   Kandice Peña RD, CDN, [facility]   Pager # [phone] Seen for: nutrition follow up   Source: patient, RN, PA, EMR, HF rounds    Chart reviewed, events noted. 65 year old male with PMHx of NICM, s/p  HM2 LVAD in , on coumadin, CKD 3 presents with anemia, dark stools and supra therapeutic INR. S/p capsule study, endoscopy. Course c/b possible aspiration event. C-Diff +, being treated with vancomycin. S/p tracheostomy on . Intermittently on trach collar.    Diet, NPO with Tube Feed: Jevity 1.2 @ 60 ml/hr x 24 hours + Probiotic smoothie 2x/day     EN Order Provides: 1728 kcal, 80 g protein   (meeting 22 kcal/kg, 1.02 g/kg protein using dosing weight 78.5 kg)    EN provision per chart:   (7/15) at goal rate thus far  () 89% of goal   () 20% of goal; pt pulled NGT & refused   () 92% of goal  () 100% of goal  (7/10) 100% of goal   () 50% of goal    GI:  Diarrhea continues  - Bowel Regimen: none at this time  - C. Diff + (); remains on vancomycin   - Per rounds, unclear whether persistent diarrhea is related to C. Diff versus tube feeds  - Formula changed from Vital AF to Jevity 1.2 on  to provide more fiber to aid in stool bulking   - Per rounds, plan to add metamucil to aid in thickening stools    Stool count:   (7/15) 3x thus far  () 2x  () 3x  () none noted  () none noted  (7/10) 1x  () 8x  () none noted  () none noted  () 2x    Weight history:   Admission weight: 176.3 pounds / 80 kg (6/15)    Weights:   Daily Weight in k.5 (), Weight in k.4 (), Weight in k.2 (), Weight in k.1 (07-10)    MEDICATIONS  (STANDING):  aMIOdarone    Tablet  insulin lispro (ADMELOG) corrective regimen sliding scale  lactated ringers.  metoprolol tartrate  pantoprazole   Suspension  vancomycin    Solution    Pertinent Labs: 07-15 @ 00:37: Na 133<L>, BUN 37<H>, Cr 0.94, <H>, K+ 4.7, Phos 2.8, Mg 2.4, Alk Phos 85, ALT/SGPT 10, AST/SGOT 22, HbA1c --   @ 14:19: Na 135, BUN 45<H>, Cr 1.28, <H>, K+ 4.4, Phos --, Mg --, Alk Phos --, ALT/SGPT --, AST/SGOT --, HbA1c --    A1C with Estimated Average Glucose Result: 5.6 % (06-15-21 @ 02:42)    Finger Sticks:  POCT Blood Glucose.: 117 mg/dL (07-15 @ 05:02)  POCT Blood Glucose.: 148 mg/dL ( @ 18:03)    Triglycerides, Serum: 145 mg/dL (21 @ 00:53)  Triglycerides, Serum: 191 mg/dL (21 @ 00:36)  Triglycerides, Serum: 183 mg/dL (21 @ 00:43)  Triglycerides, Serum: 518 mg/dL (21 @ 09:02)    Skin per chart: no pressure injuries  Edema per chart: none noted    Estimated Nutrition Needs:   - Using dosing weight 78.5 kg, considering vent support  - Energy Needs (20-25 kcal/kg): 9381-5460 kcal/day  - Protein Needs (1.2-1.4 g/kg):     Previous Nutrition Diagnosis:  Acute moderate malnutrition  Nutrition diagnosis is ongoing, being addressed with tube feeds as tolerated    New Nutrition Diagnosis: n/a       Recommendations:      1) Continue Jevity 1.2 @ 60 ml/hr x 24 hours to meet 1728 kcal, 80 g protein (meeting 22 kcal/kg, 1.02 g/kg protein using dosing weight 78.5 kg).   2) Pending tolerance of formula change, when meets goal rate, add Prosource TF supplement 2x/day to provide 102 g protein to meet 1.3 g/kg/day  3) Continue Probiotic smoothie  4) Consider addition of Metamucil to aid in stool bulking. Ensure adequate administration/flushing to avoid NGT clogging. Discussed with CLAUDIA Ambrocio     Continue monitoring and evaluating:   - Labs: blood glucose, electrolytes, renal indices  - GI function and bowel movements  - Nutritional intake, weight trends, skin integrity    RD remains available PRN and will follow up per protocol.   Kandice Peña RD, CDN, [facility]   Pager # [phone]

## 2021-07-15 NOTE — PROGRESS NOTE ADULT - ATTENDING COMMENTS
some progress. has resonded to fluid resucitation. central line placed yesterday.   diahrea continues. NG tube in situ and tolerating feeds.   meds: heparin (PTT 62), coumadin (INR 1.9), vanco PO, lactateR, insulin, lopressor 25 bid, PPI.   CVP 5, HR 80-90,, MAPs 70-80s, afebrile.   I/O +1700  LVAD 9200, 4.6, PI 6.3.                         8.8    9.83  )-----------( 186      ( 15 Jul 2021 00:37 )             29.5   07-15    133<L>  |  96  |  37<H>  ----------------------------<  122<H>  4.7   |  26  |  0.94    Ca    10.1      15 Jul 2021 00:37  Phos  2.8     07-15  Mg     2.4     07-15  TPro  8.6<H>  /  Alb  4.4  /  TBili  0.3  /  DBili  x   /  AST  22  /  ALT  10  /  AlkPhos  85  07-15  improving. decreasing WBC. improving renal function.   discussed on MDR: likely feed assoc diahrea in view of clinical improvement.   Plan:   adjust feeds.   d/c IV fluids.   d/c heparin when INR > 2.   PT.  Zi Werner

## 2021-07-15 NOTE — PROGRESS NOTE ADULT - ASSESSMENT
imp/rx:  Suspected aspiration event at time of Endoscopic procedure with subsequent development of C.diff diarrhea  Now with increasing leukocytosis and gram + cocci in clusters from blood culture sent 6/26 with organism ID/sensitivity pending  Started IV Vancomycin  Vanco CLAU=4 is borderline  Will change antibiotics to Cefazolin  Would change CVC lines as feasible   repeat blood cultures 6/29 x2 sets are negative  completed therapy with Cefazolin    Increasing Leukocytosis over the past few days but with normal WBC count today  His CVC lines were changed on 7/14  likely contributed to improving WBC  Blood cultures sent and pending        Clostridiodes difficile :  Abdomen somewhat distended- but soft- loose stool again noted  WBC within the normal range  Continue Vanco oral but reduced to tapering dose of 125mg bid  - diarrhea improved when feeds were  held making it likely that the enteral feeds are also contributing to the ongoing diarrhea  as stool is again mucus and increasing will try to obtain FMT product from [employer]- arrival of product expected on 7/20            Morris Prater MD  [phone]  After 5pm/weekends [phone]

## 2021-07-15 NOTE — PROGRESS NOTE ADULT - SUBJECTIVE AND OBJECTIVE BOX
RICKY HAMPTON  MRN-[id number]  Patient is a 66y old  Male who presents with a chief complaint of Anemia, Supratherapeutic INR, Dark Stools (15 Jul 2021 06:58)    HPI:  65M PMH ACC/AHA stage D HF due to NICM HM2 LVAD , TV annuloplasty ring 17 as destination therapy due to severe peripheral artery disease with significant stenosis  SIADH, Depression, CKD-3 with hyperkalemia, past E. coli UTIs, driveline drainage (21) and COVID-19 (back in 2020)  He was recently seen in clinic where he complained of abdominal pain and dark stools w constipation back in May. He presents to [facility] ER today weakness and fatigue, moderate and + Black stools for three days, on coumadin secondary to warfarin use in the setting of an LVAD. Patient has required transfusions for GIB in the past. Mostly recently back in 2021 pt had anemia with dark stools. No interventions was done at that time. However Last Endoscopy was done in 2020 (negative). Today labs show patient is anemic with H/H of 4.5/16.3,. INR is 8.84 MAP in the 90s, Temp 35.1. He denies any chest pain, shortness of breath, dizziness, abd pain, nausea or vomiting.       (2021 16:57)      Surgery/Hospital Course:   admit for melena w/ anemia, INR 8.84   6/15 Capsul study (+) for small bowel bleed, balloon endoscopy (old blood in prox ileum); post EGD - septic w/ L opacity, re-intubated for concern for aspiration, TTE (Mod MR, decrease biV w/ interventricular septum boweing towards R)   bronch    TC    CT C/A/P: Fluid filled colon which may be 2/2 rapid transit. Small bilateral pleffs with associates. Compressive atelectasis New ISABELLE & LLL  parenchymal opacities, suspicious for pneumonia. 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    #8 Shiley trach at bedside    CPAP trials    LVAD speed increased to 9200   Bronch; Central line dc'ed    Today:    ICU Vital Signs Last 24 Hrs  T(C): 36.8 (15 Jul 2021 08:00), Max: 36.8 (15 Jul 2021 08:00)  T(F): 98.2 (15 Jul 2021 08:00), Max: 98.2 (15 Jul 2021 08:00)  HR: 95 (15 Jul 2021 08:00) (72 - 102)  BP: --  BP(mean): 82 (2021 20:00) (82 - 86)  ABP: 123/72 (15 Jul 2021 08:00) (74/64 - 149/70)  ABP(mean): 88 (15 Jul 2021 08:00) (68 - 102)  RR: 20 (15 Jul 2021 08:00) (17 - 61)  SpO2: 100% (15 Jul 2021 08:00) (97% - 100%)      Physical Exam:  Gen:  Awake, alert   CNS: non focal 	  Neck: no JVD  RES : clear , no wheezing              CVS: Regular  rhythm. Normal S1/S2  Abd: Soft, non-distended. Bowel sounds present.  Skin: No rash.  Ext:  no edema    ============================I/O===========================   I&O's Detail    2021 07:01  -  15 Jul 2021 07:00  --------------------------------------------------------  IN:    Enteral Tube Flush: 30 mL    Heparin: 202 mL    Lactated Ringers: 1800 mL    Miscellaneous Tube Feedin mL  Total IN: 3412 mL    OUT:    Voided (mL): 1625 mL  Total OUT: 1625 mL    Total NET: 1787 mL      15 Jul 2021 07:01  -  15 Jul 2021 09:02  --------------------------------------------------------  IN:    Heparin: 12 mL    Lactated Ringers: 75 mL    Miscellaneous Tube Feedin mL  Total IN: 147 mL    OUT:    Voided (mL): 0 mL  Total OUT: 0 mL    Total NET: 147 mL        ============================ LABS =========================                        8.8    9.83  )-----------( 186      ( 15 Jul 2021 00:37 )             29.5     07-15    133<L>  |  96  |  37<H>  ----------------------------<  122<H>  4.7   |  26  |  0.94    Ca    10.1      15 Jul 2021 00:37  Phos  2.8     07-15  Mg     2.4     07-15    TPro  8.6<H>  /  Alb  4.4  /  TBili  0.3  /  DBili  x   /  AST  22  /  ALT  10  /  AlkPhos  85  07-15    LIVER FUNCTIONS - ( 15 Jul 2021 00:37 )  Alb: 4.4 g/dL / Pro: 8.6 g/dL / ALK PHOS: 85 U/L / ALT: 10 U/L / AST: 22 U/L / GGT: x           PT/INR - ( 2021 14:19 )   PT: 22.6 sec;   INR: 1.94 ratio         PTT - ( 15 Jul 2021 08:06 )  PTT:62.8 sec  ABG - ( 15 Jul 2021 00:31 )  pH, Arterial: 7.39  pH, Blood: x     /  pCO2: 52    /  pO2: 142   / HCO3: 31    / Base Excess: 5.8   /  SaO2: 100         Urinalysis Basic - ( 2021 20:32 )    Color: Yellow / Appearance: Clear / S.023 / pH: x  Gluc: x / Ketone: Negative  / Bili: Negative / Urobili: Negative   Blood: x / Protein: 30 mg/dL / Nitrite: Negative   Leuk Esterase: Negative / RBC: 1 /hpf / WBC 2 /HPF   Sq Epi: x / Non Sq Epi: 0 /hpf / Bacteria: Negative      ======================Micro/Rad/Cardio=================  Culture: Reviewed   CXR: Reviewed  Echo: Reviewed  ======================================================  PAST MEDICAL & SURGICAL HISTORY:  CHF (congestive heart failure)    CAD (coronary artery disease)    Depression    Pleural effusion    History of  novel coronavirus disease (COVID-19)  2020    Hemorrhoids    Bleeding hemorrhoids    Peripheral arterial disease    Claudication    BPH with urinary obstruction    ACC/AHA stage D systolic heart failure    Anticoagulation goal of INR 2.0 to 2.5    Falls    Clavicle fracture    CKD (chronic kidney disease), stage III    Iron deficiency anemia    H/O epistaxis    Vertigo    GI bleed    S/P TVR (tricuspid valve repair)    S/P ventricular assist device    S/P endoscopy      ====================ASSESSMENT ==============  Stage D Nonischemic Cardiomyopathy, Status Post HM2 on 2017   Recent driveline infection on 2021   Cardiogenic shock  Hemodynamic instability   Acute hypoxemic respiratory failure  GI bleed , Status Post Enteroscopy   Anemia, in setting of melena   Chronic Kidney Disease  Stress hyperglycemia   C.diff positive on    Hypovolemic shock  Hyponatremia   Leukocytosis    Plan:  ====================== NEUROLOGY=====================  Nonfocal, continue to monitor neuro status   Tylenol PRN for fevers/ analgesia   Klonopin for agitation   PT/ambulation as tolerated     acetaminophen    Suspension .. 650 milliGRAM(s) Oral every 6 hours PRN Temp greater or equal to 38C (100.4F)  acetaminophen    Suspension .. 650 milliGRAM(s) Oral every 6 hours PRN Mild Pain (1 - 3)  clonazePAM  Tablet 0.5 milliGRAM(s) Oral at bedtime  clonazePAM  Tablet 0.25 milliGRAM(s) Oral every 24 hours  ==================== RESPIRATORY======================  Acute hypoxemic resp failure s/p #8 shiley trach on on ; TC since , continue as tolerated.   Continue close monitoring of respiratory rate and breathing pattern, following of ABG’s with A-line monitoring, continuous pulse oximetry monitoring.     Mechanical Ventilation:      ====================CARDIOVASCULAR==================  Stage D Nonischemic Cardiomyopathy, Status Post HM2 on 2017; LVAD settings 9200 and flow 5.4   Echo 6/15: severe global Left ventricular systolic dysfunction with HM2 in place, decreased Right ventricular systolic function, interventricular septums bows slightly to right, otherwise grossly similar to prior.   Invasive hemodynamic monitoring, assess perfusion indices.   Continue rate control with Lopressor and Amiodarone   Pressor support with Hydralazine     aMIOdarone    Tablet 200 milliGRAM(s) Oral daily  hydrALAZINE 25 milliGRAM(s) Oral every 8 hours PRN MAP greater than 85  metoprolol tartrate 25 milliGRAM(s) Oral every 12 hours  ===================HEMATOLOGIC/ONC ===================  Anemia due to acute blood loss associated with small bowel bleed; monitor H&H/Plts  Continue AC therapy with IV Heparin for LVAD circuit and VTE prophylaxis      heparin  Infusion 1000 Unit(s)/Hr (11.5 mL/Hr) IV Continuous <Continuous>  ===================== RENAL =========================  Continue to monitor I/Os, BUN/Creatinine, and urine output.   Goal net negative fluid balance. Replete lytes PRN. Keep K> 4 and Mg >2.     ==================== GASTROINTESTINAL===================  Tolerating TF, Jevity 1.2 Tank @ goal 60 cc/hr.   Continue Protonix for stress ulcer prophylaxis.     lactated ringers. 1000 milliLiter(s) (75 mL/Hr) IV Continuous <Continuous>  pantoprazole   Suspension 40 milliGRAM(s) Enteral Tube daily  =======================    ENDOCRINE  =====================  A1c 5.6   Hx of DMT2, glycemic control with Admelog sliding scale   Monitor blood glucose levels.     insulin lispro (ADMELOG) corrective regimen sliding scale   SubCutaneous every 6 hours  ========================INFECTIOUS DISEASE================  Afebrile, Leukocytosis w/ WBC downtrending 19.79 --> 9.83  Monitor temperature and trend WBC.   : C.diff positive, will continue Vancomycin for C.diff     vancomycin    Solution 125 milliGRAM(s) Oral every 12 hours      I have spent 35 minutes providing acute care for this critically ill patient     Patient requires continuous monitoring with bedside rhythm monitoring, pulse ox monitoring, and intermittent blood gas analysis. Care plan discussed with ICU care team. Patient remained critical and at risk for life threatening decompensation.     By signing my name below, I, Brian Robin, attest that this documentation has been prepared under the direction and in the presence of Kota Thomas MD   Electronically signed: Cesia Gonzalez, 07-15-21 @ 09:02    I, Kota Thomas, personally performed the services described in this documentation. all medical record entries made by the luisibmel were at my direction and in my presence. I have reviewed the chart and agree that the record reflects my personal performance and is accurate and complete  Electronically signed: Kota Thomas MD        RICKY HAMPTON  MRN-[id number]  Patient is a 66y old  Male who presents with a chief complaint of Anemia, Supratherapeutic INR, Dark Stools (15 Jul 2021 06:58)    HPI:  65M PMH ACC/AHA stage D HF due to NICM HM2 LVAD , TV annuloplasty ring 17 as destination therapy due to severe peripheral artery disease with significant stenosis  SIADH, Depression, CKD-3 with hyperkalemia, past E. coli UTIs, driveline drainage (21) and COVID-19 (back in 2020)  He was recently seen in clinic where he complained of abdominal pain and dark stools w constipation back in May. He presents to [facility] ER today weakness and fatigue, moderate and + Black stools for three days, on coumadin secondary to warfarin use in the setting of an LVAD. Patient has required transfusions for GIB in the past. Mostly recently back in 2021 pt had anemia with dark stools. No interventions was done at that time. However Last Endoscopy was done in 2020 (negative). Today labs show patient is anemic with H/H of 4.5/16.3,. INR is 8.84 MAP in the 90s, Temp 35.1. He denies any chest pain, shortness of breath, dizziness, abd pain, nausea or vomiting.       (2021 16:57)      Surgery/Hospital Course:   admit for melena w/ anemia, INR 8.84   6/15 Capsul study (+) for small bowel bleed, balloon endoscopy (old blood in prox ileum); post EGD - septic w/ L opacity, re-intubated for concern for aspiration, TTE (Mod MR, decrease biV w/ interventricular septum boweing towards R)   bronch    TC    CT C/A/P: Fluid filled colon which may be 2/2 rapid transit. Small bilateral pleffs with associates. Compressive atelectasis New ISABELLE & LLL  parenchymal opacities, suspicious for pneumonia. 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    #8 Shiley trach at bedside    CPAP trials    LVAD speed increased to 9200   Bronch; Central line dc'ed    Today:  Continue Heparin with dose of coumadin today for a goal INR of 3. Acute respiratory failure w/ hypoxia continue to wean TC as tolerated. C.diff positive, continue with Vancomycin.   ICU Vital Signs Last 24 Hrs  T(C): 36.8 (15 Jul 2021 08:00), Max: 36.8 (15 Jul 2021 08:00)  T(F): 98.2 (15 Jul 2021 08:00), Max: 98.2 (15 Jul 2021 08:00)  HR: 95 (15 Jul 2021 08:00) (72 - 102)  BP: --  BP(mean): 82 (2021 20:00) (82 - 86)  ABP: 123/72 (15 Jul 2021 08:00) (74/64 - 149/70)  ABP(mean): 88 (15 Jul 2021 08:00) (68 - 102)  RR: 20 (15 Jul 2021 08:00) (17 - 61)  SpO2: 100% (15 Jul 2021 08:00) (97% - 100%)      Physical Exam:  Gen:  Awake, alert   CNS: non focal 	  Neck: no JVD  RES : clear , no wheezing              CVS: Regular  rhythm. Normal S1/S2  Abd: Soft, non-distended. Bowel sounds present.  Skin: No rash.  Ext:  no edema    ============================I/O===========================   I&O's Detail    2021 07:01  -  15 Jul 2021 07:00  --------------------------------------------------------  IN:    Enteral Tube Flush: 30 mL    Heparin: 202 mL    Lactated Ringers: 1800 mL    Miscellaneous Tube Feedin mL  Total IN: 3412 mL    OUT:    Voided (mL): 1625 mL  Total OUT: 1625 mL    Total NET: 1787 mL      15 Jul 2021 07:01  -  15 Jul 2021 09:02  --------------------------------------------------------  IN:    Heparin: 12 mL    Lactated Ringers: 75 mL    Miscellaneous Tube Feedin mL  Total IN: 147 mL    OUT:    Voided (mL): 0 mL  Total OUT: 0 mL    Total NET: 147 mL        ============================ LABS =========================                        8.8    9.83  )-----------( 186      ( 15 Jul 2021 00:37 )             29.5     07-15    133<L>  |  96  |  37<H>  ----------------------------<  122<H>  4.7   |  26  |  0.94    Ca    10.1      15 Jul 2021 00:37  Phos  2.8     07-15  Mg     2.4     07-15    TPro  8.6<H>  /  Alb  4.4  /  TBili  0.3  /  DBili  x   /  AST  22  /  ALT  10  /  AlkPhos  85  07-15    LIVER FUNCTIONS - ( 15 Jul 2021 00:37 )  Alb: 4.4 g/dL / Pro: 8.6 g/dL / ALK PHOS: 85 U/L / ALT: 10 U/L / AST: 22 U/L / GGT: x           PT/INR - ( 2021 14:19 )   PT: 22.6 sec;   INR: 1.94 ratio         PTT - ( 15 Jul 2021 08:06 )  PTT:62.8 sec  ABG - ( 15 Jul 2021 00:31 )  pH, Arterial: 7.39  pH, Blood: x     /  pCO2: 52    /  pO2: 142   / HCO3: 31    / Base Excess: 5.8   /  SaO2: 100         Urinalysis Basic - ( 2021 20:32 )    Color: Yellow / Appearance: Clear / S.023 / pH: x  Gluc: x / Ketone: Negative  / Bili: Negative / Urobili: Negative   Blood: x / Protein: 30 mg/dL / Nitrite: Negative   Leuk Esterase: Negative / RBC: 1 /hpf / WBC 2 /HPF   Sq Epi: x / Non Sq Epi: 0 /hpf / Bacteria: Negative      ======================Micro/Rad/Cardio=================  Culture: Reviewed   CXR: Reviewed  Echo: Reviewed  ======================================================  PAST MEDICAL & SURGICAL HISTORY:  CHF (congestive heart failure)    CAD (coronary artery disease)    Depression    Pleural effusion    History of 2019 novel coronavirus disease (COVID-19)  2020    Hemorrhoids    Bleeding hemorrhoids    Peripheral arterial disease    Claudication    BPH with urinary obstruction    ACC/AHA stage D systolic heart failure    Anticoagulation goal of INR 2.0 to 2.5    Falls    Clavicle fracture    CKD (chronic kidney disease), stage III    Iron deficiency anemia    H/O epistaxis    Vertigo    GI bleed    S/P TVR (tricuspid valve repair)    S/P ventricular assist device    S/P endoscopy      ====================ASSESSMENT ==============  Stage D Nonischemic Cardiomyopathy, Status Post HM2 on 2017   Recent driveline infection on 2021   Cardiogenic shock  Hemodynamic instability   Acute hypoxemic respiratory failure  GI bleed , Status Post Enteroscopy   Anemia, in setting of melena   Chronic Kidney Disease  Stress hyperglycemia   C.diff positive on    Hypovolemic shock  Hyponatremia   Leukocytosis    Plan:  ====================== NEUROLOGY=====================  Nonfocal, continue to monitor neuro status   Tylenol PRN for fevers/ analgesia   Klonopin for agitation   PT/ambulation as tolerated     acetaminophen    Suspension .. 650 milliGRAM(s) Oral every 6 hours PRN Temp greater or equal to 38C (100.4F)  acetaminophen    Suspension .. 650 milliGRAM(s) Oral every 6 hours PRN Mild Pain (1 - 3)  clonazePAM  Tablet 0.5 milliGRAM(s) Oral at bedtime  clonazePAM  Tablet 0.25 milliGRAM(s) Oral every 24 hours  ==================== RESPIRATORY======================  Acute hypoxemic resp failure s/p #8 shiley trach on on ; TC since , continue as tolerated.   Continue close monitoring of respiratory rate and breathing pattern, following of ABG’s with A-line monitoring, continuous pulse oximetry monitoring.     Mechanical Ventilation:      ====================CARDIOVASCULAR==================  Stage D Nonischemic Cardiomyopathy, Status Post HM2 on 2017; LVAD settings 9200 and flow 5.4   Echo 6/15: severe global Left ventricular systolic dysfunction with HM2 in place, decreased Right ventricular systolic function, interventricular septums bows slightly to right, otherwise grossly similar to prior.   Invasive hemodynamic monitoring, assess perfusion indices.   Continue rate control with Lopressor and Amiodarone   Pressor support with Hydralazine     aMIOdarone    Tablet 200 milliGRAM(s) Oral daily  hydrALAZINE 25 milliGRAM(s) Oral every 8 hours PRN MAP greater than 85  metoprolol tartrate 25 milliGRAM(s) Oral every 12 hours  ===================HEMATOLOGIC/ONC ===================  Anemia due to acute blood loss associated with small bowel bleed; monitor H&H/Plts  Continue AC therapy with IV Heparin for LVAD circuit and VTE prophylaxis   Continue Heparin with dose of coumadin today for a goal INR of 3.      heparin  Infusion 1000 Unit(s)/Hr (11.5 mL/Hr) IV Continuous <Continuous>  ===================== RENAL =========================  Continue to monitor I/Os, BUN/Creatinine, and urine output.   Goal net negative fluid balance. Replete lytes PRN. Keep K> 4 and Mg >2.     ==================== GASTROINTESTINAL===================  Tolerating TF, Jevity 1.2 Tank @ goal 60 cc/hr.   Continue Protonix for stress ulcer prophylaxis.     lactated ringers. 1000 milliLiter(s) (75 mL/Hr) IV Continuous <Continuous>  pantoprazole   Suspension 40 milliGRAM(s) Enteral Tube daily  =======================    ENDOCRINE  =====================  A1c 5.6   Hx of DMT2, glycemic control with Admelog sliding scale   Monitor blood glucose levels.     insulin lispro (ADMELOG) corrective regimen sliding scale   SubCutaneous every 6 hours  ========================INFECTIOUS DISEASE================  Afebrile, Leukocytosis w/ WBC downtrending 19.79 --> 9.83  Monitor temperature and trend WBC.   : C.diff positive, will continue Vancomycin for C.diff     vancomycin    Solution 125 milliGRAM(s) Oral every 12 hours      I have spent 35 minutes providing acute care for this critically ill patient     Patient requires continuous monitoring with bedside rhythm monitoring, pulse ox monitoring, and intermittent blood gas analysis. Care plan discussed with ICU care team. Patient remained critical and at risk for life threatening decompensation.     By signing my name below, I, Brian Robin, attest that this documentation has been prepared under the direction and in the presence of Kota Thomas MD   Electronically signed: Romel Gonzalez, 07-15-21 @ 09:02    I, Kota Thomas, personally performed the services described in this documentation. all medical record entries made by the romel were at my direction and in my presence. I have reviewed the chart and agree that the record reflects my personal performance and is accurate and complete  Electronically signed: Kota Thomas MD

## 2021-07-15 NOTE — PROGRESS NOTE ADULT - SUBJECTIVE AND OBJECTIVE BOX
RICKY JOINT  MRN#: [id number]  Subjective:  Pulmonary progress  : recurrent Acute hypoxic respiratory Failure ,aspiration pneumonia, NICM  , chart reviewed and H/O obtained radiological and Laboratory study reviewed patient Examined     65M PMH ACC/AHA stage D HF due to NICM HM2 LVAD , TV annuloplasty ring 17 as destination therapy due to severe peripheral artery disease with significant stenosis  SIADH, Depression, CKD-3 with hyperkalemia, past E. coli UTIs, driveline drainage (21) and COVID-19 (back in 2020)  He was recently seen in clinic where he complained of abdominal pain and dark stools w constipation back in May. He presents to [facility] ER today weakness and fatigue, moderate and + Black stools for three days, on coumadin secondary to warfarin use in the setting of an LVAD. Patient has required transfusions for GIB in the past. Mostly recently back in 2021 pt had anemia with dark stools. No interventions was done at that time. However Last Endoscopy was done in 2020 (negative). Today labs show patient is anemic with H/H of 4.5/16.3,. INR is 8.84 MAP in the 90s, Temp 35.1. He denies any chest pain, shortness of breath, dizziness, abd pain, nausea or vomiting. found to have  rectal bleeding underwent endoscopy ,old blood in the proximal ileum ,  develop sepsis with LL opacity given Antibiotics , Extubated , reintubated , Bronchoscopy on Zosyn for LL pneumonia  and Amiodarone S/P TV Annuloplasty , patient remain intubated on full ventilatory support .S/P multiple units of blood transfusion , remain on full ventilatory support on Precedex and propofol , new central IJ line , diarrhea C diff. +ve on po vancomycin and IV Flagyl,  mildly distended belly , fever start on cefepime 2gm q 8 hrs S/P tracheostomy .  new RT Subclavian central line continue on contact  isolation ,still diarrhea on C-diff antibiotics ENT follow up appreciated , trial of C-PAP as tolerated , , copious secretion from trach. site chest x ray left lower lobe pneumonia , tolerating trch. collar 50% FI02 still excessive secretion need pulmonary toilet , off Ancef antibiotic , no more diarrhea back on full support mechanical ventilator , chest x ray show improvement in LLL air space disease, more awake and responsive on tube feeding no more diarrhea , S/P  Ancef for bacteremia no fever ,ID follow up noted ,  no nausea or vomiting or diarrhea still very weak and tired , event note pulled NG tube now replaced , back on tube feeding          (2021 16:57)    PAST MEDICAL & SURGICAL HISTORY:  CHF (congestive heart failure)    CAD (coronary artery disease)  Depression    Pleural effusion    History of 2019 novel coronavirus disease (COVID-19)  2020    Hemorrhoids    Bleeding hemorrhoids    Peripheral arterial disease    Claudication    BPH with urinary obstruction    ACC/AHA stage D systolic heart failure  Anticoagulation goal of INR 2.0 to 2.5    Falls    Clavicle fracture    CKD (chronic kidney disease), stage III    Iron deficiency anemia    H/O epistaxis    Vertigo    GI bleed    S/P TVR (tricuspid valve repair)    S/P ventricular assist device    S/P endoscopy    OBJECTIVE:  ICU Vital Signs Last 24 Hrs  T(C): 38.2 (2021 10:00), Max: 38.5 (2021 12:00)  T(F): 100.8 (2021 10:00), Max: 101.3 (2021 12:00)  HR: 65 (2021 10:00) (61 - 69)  BP: --  BP(mean): --  ABP: 105/67 (2021 10:00) (90/54 - 113/64)  ABP(mean): 77 (2021 10:00) (63 - 77)  RR: 20 (2021 10:00) (19 - 35)  SpO2: 99% (2021 10:00) (96% - 100%)       @ :  -   @ 07:00  --------------------------------------------------------  IN: 2693.9 mL / OUT: 1415 mL / NET: 1278.9 mL     @ 07:01  -   @ 10:49  --------------------------------------------------------  IN: 420.8 mL / OUT: 115 mL / NET: 305.8 mL  PHYSICAL EXAM:Daily   Elderly male S/P tracheostomy   on full ventilatory support /40%20/5cm PEEP  0n vasopressin , alternating with trach. collar on IV heparin   Daily Weight in k.4 (2021 00:00)  HEENT:     + NCAT  + EOMI  - Conjuctival edema   - Icterus   - Thrush   - ETT  + NGT/OGT  Neck:         + FROM   RT SC line  JVD     - Nodes     - Masses    + Mid-line trachea + Tracheostomy  Chest: normal findings  Lungs:          + CTA   + Rhonchi    - Rales    - Wheezing + Decreased  LT BS   - Dullness R L  Cardiac:       + S1 + S2    + RRR   - Irregular   - S3  - S4    - Murmurs   - Rub   - Hamman’s sign   Abdomen:    + BS     + Soft    + Non-tender     - Distended    - Organomegaly  - PEG  Extremities:   - Cyanosis U/L   - Clubbing  U/L  + LE/UE Edema   + Capillary refill    + Pulses   Neuro:        - Awake   -  Alert   - Confused   - Lethargic   + Sedated  + Generalized Weakness  Skin:        - Rashes    - Erythema   + Normal incisions   + IV sites intact          HOSPITAL MEDICATIONS: All mediciations reviewed and analyzed  MEDICATIONS  (STANDING):  aMIOdarone    Tablet 200 milliGRAM(s) Oral daily  chlorhexidine 0.12% Liquid 15 milliLiter(s) Oral Mucosa every 12 hours  chlorhexidine 2% Cloths 1 Application(s) Topical <User Schedule>  dexMEDEtomidine Infusion 0.5 MICROgram(s)/kG/Hr (9.81 mL/Hr) IV Continuous <Continuous>  dextrose 50% Injectable 50 milliLiter(s) IV Push every 15 minutes  heparin  Infusion 400 Unit(s)/Hr (12.5 mL/Hr) IV Continuous <Continuous>  HYDROmorphone  Injectable 0.5 milliGRAM(s) IV Push once  insulin lispro (ADMELOG) corrective regimen sliding scale   SubCutaneous every 6 hours  pantoprazole  Injectable 40 milliGRAM(s) IV Push every 12 hours  piperacillin/tazobactam IVPB.. 3.375 Gram(s) IV Intermittent every 8 hours  propofol Infusion 20 MICROgram(s)/kG/Min (9.42 mL/Hr) IV Continuous <Continuous>  sodium chloride 0.9% lock flush 3 milliLiter(s) IV Push every 8 hours  sodium chloride 0.9%. 1000 milliLiter(s) (10 mL/Hr) IV Continuous <Continuous>    MEDICATIONS  (PRN):  acetaminophen    Suspension .. 650 milliGRAM(s) Oral every 6 hours PRN Temp greater or equal to 38C (100.4F)    LABS: All Lab data reviewed and analyzed                                   8.8    9.83  )-----------( 186      ( 15 Jul 2021 00:37 )             29.5   07-15    133<L>  |  96  |  37<H>  ----------------------------<  122<H>  4.7   |  26  |  0.94    Ca    10.1      15 Jul 2021 00:37  Phos  2.8     07-15  Mg     2.4     07-15    TPro  8.6<H>  /  Alb  4.4  /  TBili  0.3  /  DBili  x   /  AST  22  /  ALT  10  /  AlkPhos  85  07-15      Ca    10.5      2021 00:43  Phos  4.1     07-14  Mg     2.4     07-14    TPro  9.1<H>  /  Alb  5.5<H>  /  TBili  0.8  /  DBili  x   /  AST  16  /  ALT  10  /  AlkPhos  81  07-14      Ca    10.5      2021 00:39  Phos  4.6     07-13  Mg     2.4     07-13    TPro  9.9<H>  /  Alb  4.5  /  TBili  0.4  /  DBili  x   /  AST  22  /  ALT  10  /  AlkPhos  121<H>  07-13    Ca    10.2      2021 00:50  Phos  4.3     07-12  Mg     2.2     07-12    TPro  9.5<H>  /  Alb  4.3  /  TBili  0.3  /  DBili  x   /  AST  23  /  ALT  10  /  AlkPhos  115  07-12    Ca    10.5      2021 00:29  Phos  3.8     07-11  Mg     2.0     07-11    TPro  9.4<H>  /  Alb  4.2  /  TBili  0.4  /  DBili  x   /  AST  19  /  ALT  10  /  AlkPhos  110  07-11                                     PTT - ( 2021 04:52 )  PTT:45.2 sec LIVER FUNCTIONS - ( 2021 00:42 )  Alb: 3.4 g/dL / Pro: 6.7 g/dL / ALK PHOS: 213 U/L / ALT: 15 U/L / AST: 24 U/L / GGT: x           RADIOLOGY: - Reviewed and analyzed LLL  pneumonia , LVAD HM2, CT scan of abdomen reviewed result noted

## 2021-07-15 NOTE — PROGRESS NOTE ADULT - PROBLEM SELECTOR PLAN 5
- in s/o diarrhea, NPO, diuresis   - improved w/ addition of fluids   - trend Cr, avoid nephrotoxins   - CVL for CVP monitoring

## 2021-07-15 NOTE — PROGRESS NOTE ADULT - SUBJECTIVE AND OBJECTIVE BOX
Patient seen and examined at bedside.    Overnight Events: No acute events overnight, receiving IV hydration in s/o RASHAWN.     Review Of Systems: No chest pain, shortness of breath, or palpitations            Current Meds:  acetaminophen    Suspension .. 650 milliGRAM(s) Oral every 6 hours PRN  acetaminophen    Suspension .. 650 milliGRAM(s) Oral every 6 hours PRN  aMIOdarone    Tablet 200 milliGRAM(s) Oral daily  chlorhexidine 2% Cloths 1 Application(s) Topical <User Schedule>  clonazePAM  Tablet 0.5 milliGRAM(s) Oral at bedtime  clonazePAM  Tablet 0.25 milliGRAM(s) Oral every 24 hours  heparin  Infusion 1000 Unit(s)/Hr IV Continuous <Continuous>  hydrALAZINE 25 milliGRAM(s) Oral every 8 hours PRN  insulin lispro (ADMELOG) corrective regimen sliding scale   SubCutaneous every 6 hours  lactated ringers. 1000 milliLiter(s) IV Continuous <Continuous>  metoprolol tartrate 25 milliGRAM(s) Oral every 12 hours  pantoprazole   Suspension 40 milliGRAM(s) Enteral Tube daily  vancomycin    Solution 125 milliGRAM(s) Oral every 12 hours      Vitals:  T(F): 97.8 (07-15), Max: 97.9 (07-14)  HR: 85 (07-15) (72 - 102)  BP: --  RR: 34 (07-15)  SpO2: 100% (07-15)  I&O's Summary    13 Jul 2021 07:01  -  14 Jul 2021 07:00  --------------------------------------------------------  IN: 2165 mL / OUT: 500 mL / NET: 1665 mL    14 Jul 2021 07:01  -  15 Jul 2021 06:58  --------------------------------------------------------  IN: 3412 mL / OUT: 1625 mL / NET: 1787 mL    Physical Exam:  Appearance: No acute distress; well appearing  Cardiovascular: RRR, VAD hum; no edema; no JVD  Respiratory: Clear to auscultation bilaterally  Gastrointestinal: soft, non-tender, non-distended with normal bowel sounds  Musculoskeletal: No clubbing; no joint deformity   Neurologic: Non-focal  Psychiatry: AAOx3, mood & affect appropriate  Skin: No rashes, ecchymoses, or cyanosis                          8.8    9.83  )-----------( 186      ( 15 Jul 2021 00:37 )             29.5     07-15    133<L>  |  96  |  37<H>  ----------------------------<  122<H>  4.7   |  26  |  0.94    Ca    10.1      15 Jul 2021 00:37  Phos  2.8     07-15  Mg     2.4     07-15    TPro  8.6<H>  /  Alb  4.4  /  TBili  0.3  /  DBili  x   /  AST  22  /  ALT  10  /  AlkPhos  85  07-15    PT/INR - ( 14 Jul 2021 14:19 )   PT: 22.6 sec;   INR: 1.94 ratio      PTT - ( 15 Jul 2021 00:37 )  PTT:44.2 sec

## 2021-07-15 NOTE — PROGRESS NOTE ADULT - PROBLEM SELECTOR PLAN 6
-C/w tx acute Cdiff, appreciate ID input   -One set of bcx w/ Staph lugdunensis which have since cleared, completed course of antibiotics

## 2021-07-15 NOTE — PROGRESS NOTE ADULT - PROBLEM SELECTOR PLAN 1
- tolerating metoprolol tartrate 25 bid, transition to toprol when tolerating PO  - off losartan, hydral prn    - hold diuretics in the setting of fluid losses   - Continue home amiodarone 200mg PO daily  - agree with gentle hydration, monitor Cr closely  - CVL for CVP monitoring, 3-7 - tolerating metoprolol tartrate 25 bid, transition to toprol when tolerating PO  - off losartan, hydral prn    - hold diuretics in the setting of fluid losses   - Continue home amiodarone 200mg PO daily  - hold IV fluids   - CVL for CVP monitoring, 3-7

## 2021-07-16 NOTE — PROGRESS NOTE ADULT - SUBJECTIVE AND OBJECTIVE BOX
INFECTIOUS DISEASES FOLLOW UP-- Anitra Prater  [phone]    This is a follow up note for this  66yMale with  Anemia s/p GI bleed  aspiration pneumonia gilbert-endoscopy  course complicated by protracted C.diff diarrhea, inability to wean from vent requiring tranch/feeding tube        ROS:  CONSTITUTIONAL:  No fever, able to ambulate with assistance, more interactive today  CARDIOVASCULAR:  No chest pain or palpitations  RESPIRATORY:  No dyspnea  GASTROINTESTINAL:  No nausea, vomiting, diarrhea, or abdominal pain  GENITOURINARY:  No dysuria  NEUROLOGIC:  No headache,     Allergies    No Known Allergies    Intolerances        ANTIBIOTICS/RELEVANT:  antimicrobials  vancomycin    Solution 125 milliGRAM(s) Oral every 12 hours    immunologic:    OTHER:  acetaminophen    Suspension .. 650 milliGRAM(s) Oral every 6 hours PRN  aMIOdarone    Tablet 200 milliGRAM(s) Oral daily  chlorhexidine 2% Cloths 1 Application(s) Topical <User Schedule>  clonazePAM  Tablet 0.5 milliGRAM(s) Oral at bedtime  heparin  Infusion 1000 Unit(s)/Hr IV Continuous <Continuous>  hydrALAZINE 25 milliGRAM(s) Oral every 8 hours PRN  insulin lispro (ADMELOG) corrective regimen sliding scale   SubCutaneous every 6 hours  metoprolol tartrate 25 milliGRAM(s) Oral every 12 hours  pantoprazole   Suspension 40 milliGRAM(s) Enteral Tube daily  psyllium Powder 1 Packet(s) Oral daily  warfarin 6 milliGRAM(s) Oral once      Objective:  Vital Signs Last 24 Hrs  T(C): 37 (2021 12:00), Max: 37.1 (2021 08:00)  T(F): 98.6 (2021 12:00), Max: 98.7 (2021 08:00)  HR: 97 (2021 17:00) (82 - 112)  BP: --  BP(mean): --  RR: 26 (2021 17:00) (8 - 52)  SpO2: 100% (2021 17:00) (95% - 100%)    PHYSICAL EXAM:  Constitutional:no acute distress  Eyes:ALONSO, EOMI  Ear/Nose/Throat: no oral lesions, NG feeding tube in place  trach site with whitish secretions	  Respiratory: clear BL audible bilat  Cardiovascular: [id number] sounds  Gastrointestinal:soft, (+) BS, no tenderness  VAD dressing CDI  Extremities:no e/e/c  No Lymphadenopathy  IV sites not inflammed.    LABS:                        9.2    8.03  )-----------( 194      ( 2021 00:40 )             30.1     07-16    133<L>  |  97  |  18  ----------------------------<  126<H>  4.5   |  25  |  0.72    Ca    10.2      2021 00:40  Phos  3.0     07-16  Mg     2.0     -16    TPro  8.8<H>  /  Alb  4.5  /  TBili  0.3  /  DBili  x   /  AST  25  /  ALT  16  /  AlkPhos  94  -16    PT/INR - ( 2021 09:58 )   PT: 20.9 sec;   INR: 1.79 ratio         PTT - ( 2021 03:32 )  PTT:58.5 sec  Urinalysis Basic - ( 2021 20:32 )    Color: Yellow / Appearance: Clear / S.023 / pH: x  Gluc: x / Ketone: Negative  / Bili: Negative / Urobili: Negative   Blood: x / Protein: 30 mg/dL / Nitrite: Negative   Leuk Esterase: Negative / RBC: 1 /hpf / WBC 2 /HPF   Sq Epi: x / Non Sq Epi: 0 /hpf / Bacteria: Negative        MICROBIOLOGY:            RECENT CULTURES:   @ 22:13  .Blood Blood  --  --  --    No growth to date.  --      RADIOLOGY & ADDITIONAL STUDIES:    < from: Xray Chest 1 View- PORTABLE-Routine (Xray Chest 1 View- PORTABLE-Routine in AM.) (21 @ 02:58) >  IMPRESSION:  No active pulmonary disease.    < end of copied text >

## 2021-07-16 NOTE — PROGRESS NOTE ADULT - ASSESSMENT
imp/rx:  Suspected aspiration event at time of Endoscopic procedure received broad spectrum antibiotics for pneumonia, developed vent failure requiring trach/feeding tube placement course further complicated by  subsequent development of C.diff diarrhea      slowly resolving          Clostridiodes difficile :  Abdomen somewhat distended- but soft- loose stool again noted but x1 by late this afternoon today  WBC within the normal range  Continue Vanco oral but reduced to tapering dose of 125mg bid  - diarrhea improved when feeds were  held making it likely that the enteral feeds are also contributing to the ongoing diarrhea  as stool is again mucus and increasing will try to obtain FMT product from [employer]- arrival of product expected on 7/20 and can decide upon need to administer based upon his symptoms at that time.            Morris Prater MD  [phone]  After 5pm/weekends [phone]

## 2021-07-16 NOTE — PROGRESS NOTE ADULT - SUBJECTIVE AND OBJECTIVE BOX
RICKY JOINT  MRN#: [id number]  Subjective:  Pulmonary progress  : recurrent Acute hypoxic respiratory Failure ,aspiration pneumonia, NICM  , chart reviewed and H/O obtained radiological and Laboratory study reviewed patient Examined     65M PMH ACC/AHA stage D HF due to NICM HM2 LVAD , TV annuloplasty ring 17 as destination therapy due to severe peripheral artery disease with significant stenosis  SIADH, Depression, CKD-3 with hyperkalemia, past E. coli UTIs, driveline drainage (21) and COVID-19 (back in 2020)  He was recently seen in clinic where he complained of abdominal pain and dark stools w constipation back in May. He presents to [facility] ER today weakness and fatigue, moderate and + Black stools for three days, on coumadin secondary to warfarin use in the setting of an LVAD. Patient has required transfusions for GIB in the past. Mostly recently back in 2021 pt had anemia with dark stools. No interventions was done at that time. However Last Endoscopy was done in 2020 (negative). Today labs show patient is anemic with H/H of 4.5/16.3,. INR is 8.84 MAP in the 90s, Temp 35.1. He denies any chest pain, shortness of breath, dizziness, abd pain, nausea or vomiting. found to have  rectal bleeding underwent endoscopy ,old blood in the proximal ileum ,  develop sepsis with LL opacity given Antibiotics , Extubated , reintubated , Bronchoscopy on Zosyn for LL pneumonia  and Amiodarone S/P TV Annuloplasty , patient remain intubated on full ventilatory support .S/P multiple units of blood transfusion , remain on full ventilatory support on Precedex and propofol , new central IJ line , diarrhea C diff. +ve on po vancomycin and IV Flagyl,  mildly distended belly , fever start on cefepime 2gm q 8 hrs S/P tracheostomy .  new RT Subclavian central line continue on contact  isolation ,still diarrhea on C-diff antibiotics ENT follow up appreciated , trial of C-PAP as tolerated , , copious secretion from trach. site chest x ray left lower lobe pneumonia , tolerating trch. collar 50% FI02 still excessive secretion need pulmonary toilet , off Ancef antibiotic , no more diarrhea back on full support mechanical ventilator , chest x ray show improvement in LLL air space disease, more awake and responsive on tube feeding no more diarrhea , S/P  Ancef for bacteremia no fever ,ID follow up noted ,  no nausea or vomiting or diarrhea still very weak and tired , event note pulled NG tube now replaced , back on tube feeding ,still on po vancomycin          (2021 16:57)    PAST MEDICAL & SURGICAL HISTORY:  CHF (congestive heart failure)    CAD (coronary artery disease)  Depression    Pleural effusion    History of 2019 novel coronavirus disease (COVID-19)  2020    Hemorrhoids    Bleeding hemorrhoids    Peripheral arterial disease    Claudication    BPH with urinary obstruction    ACC/AHA stage D systolic heart failure  Anticoagulation goal of INR 2.0 to 2.5    Falls    Clavicle fracture    CKD (chronic kidney disease), stage III    Iron deficiency anemia    H/O epistaxis    Vertigo    GI bleed    S/P TVR (tricuspid valve repair)    S/P ventricular assist device    S/P endoscopy    OBJECTIVE:  ICU Vital Signs Last 24 Hrs  T(C): 38.2 (2021 10:00), Max: 38.5 (2021 12:00)  T(F): 100.8 (2021 10:00), Max: 101.3 (2021 12:00)  HR: 65 (2021 10:00) (61 - 69)  BP: --  BP(mean): --  ABP: 105/67 (2021 10:00) (90/54 - 113/64)  ABP(mean): 77 (2021 10:00) (63 - 77)  RR: 20 (2021 10:00) (19 - 35)  SpO2: 99% (2021 10:00) (96% - 100%)       @ 07:  -   @ 07:00  --------------------------------------------------------  IN: 2693.9 mL / OUT: 1415 mL / NET: 1278.9 mL     @ 07:01  -   @ 10:49  --------------------------------------------------------  IN: 420.8 mL / OUT: 115 mL / NET: 305.8 mL  PHYSICAL EXAM:Daily   Elderly male S/P tracheostomy   on full ventilatory support /40%20/5cm PEEP  0n vasopressin , alternating with trach. collar on IV heparin   Daily Weight in k.4 (2021 00:00)  HEENT:     + NCAT  + EOMI  - Conjuctival edema   - Icterus   - Thrush   - ETT  + NGT/OGT  Neck:         + FROM   RT SC line  JVD     - Nodes     - Masses    + Mid-line trachea + Tracheostomy  Chest: normal findings  Lungs:          + CTA   + Rhonchi    - Rales    - Wheezing + Decreased  LT BS   - Dullness R L  Cardiac:       + S1 + S2    + RRR   - Irregular   - S3  - S4    - Murmurs   - Rub   - Hamman’s sign   Abdomen:    + BS     + Soft    + Non-tender     - Distended    - Organomegaly  - PEG  Extremities:   - Cyanosis U/L   - Clubbing  U/L  + LE/UE Edema   + Capillary refill    + Pulses   Neuro:        - Awake   -  Alert   - Confused   - Lethargic   + Sedated  + Generalized Weakness  Skin:        - Rashes    - Erythema   + Normal incisions   + IV sites intact          HOSPITAL MEDICATIONS: All mediciations reviewed and analyzed  MEDICATIONS  (STANDING):  aMIOdarone    Tablet 200 milliGRAM(s) Oral daily  chlorhexidine 0.12% Liquid 15 milliLiter(s) Oral Mucosa every 12 hours  chlorhexidine 2% Cloths 1 Application(s) Topical <User Schedule>  dexMEDEtomidine Infusion 0.5 MICROgram(s)/kG/Hr (9.81 mL/Hr) IV Continuous <Continuous>  dextrose 50% Injectable 50 milliLiter(s) IV Push every 15 minutes  heparin  Infusion 400 Unit(s)/Hr (12.5 mL/Hr) IV Continuous <Continuous>  HYDROmorphone  Injectable 0.5 milliGRAM(s) IV Push once  insulin lispro (ADMELOG) corrective regimen sliding scale   SubCutaneous every 6 hours  pantoprazole  Injectable 40 milliGRAM(s) IV Push every 12 hours  piperacillin/tazobactam IVPB.. 3.375 Gram(s) IV Intermittent every 8 hours  propofol Infusion 20 MICROgram(s)/kG/Min (9.42 mL/Hr) IV Continuous <Continuous>  sodium chloride 0.9% lock flush 3 milliLiter(s) IV Push every 8 hours  sodium chloride 0.9%. 1000 milliLiter(s) (10 mL/Hr) IV Continuous <Continuous>    MEDICATIONS  (PRN):  acetaminophen    Suspension .. 650 milliGRAM(s) Oral every 6 hours PRN Temp greater or equal to 38C (100.4F)    LABS: All Lab data reviewed and analyzed                                    9.2    8.03  )-----------( 194      ( 2021 00:40 )             30.1    07-16    133<L>  |  97  |  18  ----------------------------<  126<H>  4.5   |  25  |  0.72    Ca    10.2      2021 00:40  Phos  3.0     07-16  Mg     2.0     07-16    TPro  8.8<H>  /  Alb  4.5  /  TBili  0.3  /  DBili  x   /  AST  25  /  ALT  16  /  AlkPhos  94  07-16      Ca    10.1      15 Jul 2021 00:37  Phos  2.8     07-15  Mg     2.4     07-15    TPro  8.6<H>  /  Alb  4.4  /  TBili  0.3  /  DBili  x   /  AST  22  /  ALT  10  /  AlkPhos  85  07-15      Ca    10.5      2021 00:43  Phos  4.1     07-14  Mg     2.4     07-14    TPro  9.1<H>  /  Alb  5.5<H>  /  TBili  0.8  /  DBili  x   /  AST  16  /  ALT  10  /  AlkPhos  81  07-14      Ca    10.5      2021 00:39  Phos  4.6     07-13  Mg     2.4     07-13    TPro  9.9<H>  /  Alb  4.5  /  TBili  0.4  /  DBili  x   /  AST  22  /  ALT  10  /  AlkPhos  121<H>  07-13                                           PTT - ( 2021 04:52 )  PTT:45.2 sec LIVER FUNCTIONS - ( 2021 00:42 )  Alb: 3.4 g/dL / Pro: 6.7 g/dL / ALK PHOS: 213 U/L / ALT: 15 U/L / AST: 24 U/L / GGT: x           RADIOLOGY: - Reviewed and analyzed LLL  pneumonia , LVAD HM2, CT scan of abdomen reviewed result noted

## 2021-07-16 NOTE — PROGRESS NOTE ADULT - ATTENDING COMMENTS
overall making progress.   episode of N and V today. 1-2 BM.   sitting in a chair.   meds: heparin, coumadin, vanco PO, amnio 200, lopressor 25 bid, PPI.   CVP 5, MAPs 80-90s. Afebrile.   LVAD 9200, 4.8, PI 6.8   I/O: even.   07-16  133<L>  |  97  |  18  ----------------------------<  126<H>  4.5   |  25  |  0.72  Ca    10.2      16 Jul 2021 00:40  Phos  3.0     07-16  Mg     2.0     07-16  TPro  8.8<H>  /  Alb  4.5  /  TBili  0.3  /  DBili  x   /  AST  25  /  ALT  16  /  AlkPhos  94  07-16                        9.2    8.03  )-----------( 194      ( 16 Jul 2021 00:40 )             30.1   INR 1.9  discussed on MDR.  remove neck line.   titrate feeds as per nutrition.   follow off diuretics. please do daily standing weight when move to floor.   Zi Werner

## 2021-07-16 NOTE — PROGRESS NOTE ADULT - SUBJECTIVE AND OBJECTIVE BOX
RICKY HAMPTON  MRN-[id number]  Patient is a 66y old  Male who presents with a chief complaint of Anemia, Supratherapeutic INR, Dark Stools (15 Jul 2021 19:10)    HPI:  65M PMH ACC/AHA stage D HF due to NICM HM2 LVAD , TV annuloplasty ring 17 as destination therapy due to severe peripheral artery disease with significant stenosis  SIADH, Depression, CKD-3 with hyperkalemia, past E. coli UTIs, driveline drainage (21) and COVID-19 (back in 2020)  He was recently seen in clinic where he complained of abdominal pain and dark stools w constipation back in May. He presents to [facility] ER today weakness and fatigue, moderate and + Black stools for three days, on coumadin secondary to warfarin use in the setting of an LVAD. Patient has required transfusions for GIB in the past. Mostly recently back in 2021 pt had anemia with dark stools. No interventions was done at that time. However Last Endoscopy was done in 2020 (negative). Today labs show patient is anemic with H/H of 4.5/16.3,. INR is 8.84 MAP in the 90s, Temp 35.1. He denies any chest pain, shortness of breath, dizziness, abd pain, nausea or vomiting.       (2021 16:57)      Surgery/Hospital Course:   admit for melena w/ anemia, INR 8.84   6/15 Capsul study (+) for small bowel bleed, balloon endoscopy (old blood in prox ileum); post EGD - septic w/ L opacity, re-intubated for concern for aspiration, TTE (Mod MR, decrease biV w/ interventricular septum boweing towards R)   bronch    TC    CT C/A/P: Fluid filled colon which may be 2/2 rapid transit. Small bilateral pleffs with associates. Compressive atelectasis New ISABELLE & LLL  parenchymal opacities, suspicious for pneumonia. 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    #8 Shiley trach at bedside    CPAP trials    LVAD speed increased to 9200   Bronch; Central line dc'ed    Today:    ICU Vital Signs Last 24 Hrs  T(C): 36.5 (2021 04:00), Max: 36.8 (15 Jul 2021 08:00)  T(F): 97.7 (2021 04:00), Max: 98.2 (15 Jul 2021 08:00)  HR: 92 (2021 06:00) (82 - 106)  BP: --  BP(mean): --  ABP: 120/86 (2021 06:00) (88/69 - 149/70)  ABP(mean): 97 (2021 06:00) (72 - 102)  RR: 22 (2021 06:00) (14 - 52)  SpO2: 95% (2021 06:00) (95% - 100%)      Physical Exam:  Gen:  Awake, alert   CNS: non focal 	  Neck: no JVD, +TC  RES : clear , no wheezing              CVS: Regular  rhythm. Normal S1/S2  Abd: Soft, non-distended. Bowel sounds present.  Skin: No rash.  Ext:  no edema    ============================I/O===========================   I&O's Detail    2021 07:01  -  15 Jul 2021 07:00  --------------------------------------------------------  IN:    Enteral Tube Flush: 30 mL    Heparin: 202 mL    Lactated Ringers: 1800 mL    Miscellaneous Tube Feedin mL  Total IN: 3412 mL    OUT:    Voided (mL): 1625 mL  Total OUT: 1625 mL    Total NET: 1787 mL      15 Jul 2021 07:01  -  2021 06:20  --------------------------------------------------------  IN:    Enteral Tube Flush: 10 mL    Heparin: 258 mL    Lactated Ringers: 225 mL    Miscellaneous Tube Feedin mL  Total IN: 1933 mL    OUT:    Voided (mL): 1725 mL  Total OUT: 1725 mL    Total NET: 208 mL        ============================ LABS =========================                        9.2    8.03  )-----------( 194      ( 2021 00:40 )             30.1     07-16    133<L>  |  97  |  18  ----------------------------<  126<H>  4.5   |  25  |  0.72    Ca    10.2      2021 00:40  Phos  3.0     07-16  Mg     2.0     07-16    TPro  8.8<H>  /  Alb  4.5  /  TBili  0.3  /  DBili  x   /  AST  25  /  ALT  16  /  AlkPhos  94  07-16    LIVER FUNCTIONS - ( 2021 00:40 )  Alb: 4.5 g/dL / Pro: 8.8 g/dL / ALK PHOS: 94 U/L / ALT: 16 U/L / AST: 25 U/L / GGT: x           PT/INR - ( 15 Jul 2021 14:23 )   PT: 22.7 sec;   INR: 1.95 ratio         PTT - ( 2021 03:32 )  PTT:58.5 sec  ABG - ( 2021 00:26 )  pH, Arterial: 7.41  pH, Blood: x     /  pCO2: 46    /  pO2: 80    / HCO3: 29    / Base Excess: 4.1   /  SaO2: 96                Urinalysis Basic - ( 2021 20:32 )    Color: Yellow / Appearance: Clear / S.023 / pH: x  Gluc: x / Ketone: Negative  / Bili: Negative / Urobili: Negative   Blood: x / Protein: 30 mg/dL / Nitrite: Negative   Leuk Esterase: Negative / RBC: 1 /hpf / WBC 2 /HPF   Sq Epi: x / Non Sq Epi: 0 /hpf / Bacteria: Negative      ======================Micro/Rad/Cardio=================  Culture: Reviewed   CXR: Reviewed  Echo: Reviewed  ======================================================  PAST MEDICAL & SURGICAL HISTORY:  CHF (congestive heart failure)    CAD (coronary artery disease)    Depression    Pleural effusion    History of  novel coronavirus disease (COVID-19)  2020    Hemorrhoids    Bleeding hemorrhoids    Peripheral arterial disease    Claudication    BPH with urinary obstruction    ACC/AHA stage D systolic heart failure    Anticoagulation goal of INR 2.0 to 2.5    Falls    Clavicle fracture    CKD (chronic kidney disease), stage III    Iron deficiency anemia    H/O epistaxis    Vertigo    GI bleed    S/P TVR (tricuspid valve repair)    S/P ventricular assist device    S/P endoscopy      ====================ASSESSMENT ==============    Stage D Nonischemic Cardiomyopathy, Status Post HM2 on 2017   Recent driveline infection on 2021   Cardiogenic shock  Hemodynamic instability   Acute hypoxemic respiratory failure  GI bleed , Status Post Enteroscopy   Anemia, in setting of melena   Chronic Kidney Disease  Stress hyperglycemia   C.diff positive on    Hypovolemic shock  Hyponatremia   Leukocytosis    Plan:  ====================== NEUROLOGY=====================  Nonfocal, continue to monitor neuro status   Tylenol PRN for fevers/ analgesia   Klonopin for agitation   PT/ambulation as tolerated     acetaminophen    Suspension .. 650 milliGRAM(s) Oral every 6 hours PRN Mild Pain (1 - 3)  clonazePAM  Tablet 0.5 milliGRAM(s) Oral at bedtime  clonazePAM  Tablet 0.25 milliGRAM(s) Oral every 24 hours  ==================== RESPIRATORY======================  Acute hypoxemic resp failure s/p #8 shiley trach on on ; TC since , continue as tolerated.   Continue close monitoring of respiratory rate and breathing pattern, following of ABG’s with A-line monitoring, continuous pulse oximetry monitoring.     ====================CARDIOVASCULAR==================  Stage D Nonischemic Cardiomyopathy, Status Post HM2 on 2017; LVAD settings 9200 with flow of 5.  Echo 6/15: severe global Left ventricular systolic dysfunction with HM2 in place, decreased Right ventricular systolic function, interventricular septums bows slightly to right, otherwise grossly similar to prior.   Invasive hemodynamic monitoring, assess perfusion indices.   Continue rate control with Lopressor and Amiodarone   Pressor support with Hydralazine     aMIOdarone    Tablet 200 milliGRAM(s) Oral daily  hydrALAZINE 25 milliGRAM(s) Oral every 8 hours PRN MAP greater than 85  metoprolol tartrate 25 milliGRAM(s) Oral every 12 hours  ===================HEMATOLOGIC/ONC ===================  Anemia due to acute blood loss associated with small bowel bleed;   monitor H&H/Plts  Continue AC therapy with IV Heparin for LVAD circuit and VTE prophylaxis     heparin  Infusion 1000 Unit(s)/Hr (10 mL/Hr) IV Continuous <Continuous>  ===================== RENAL =========================  Continue to monitor I/Os, BUN/Creatinine, and urine output.   Goal net negative fluid balance. Replete lytes PRN. Keep K> 4 and Mg >2.   ==================== GASTROINTESTINAL===================  Tolerating TF, Jevity 1.2 Tank @ goal 60 cc/hr.   Continue Protonix for stress ulcer prophylaxis.     pantoprazole   Suspension 40 milliGRAM(s) Enteral Tube daily  psyllium Powder 1 Packet(s) Oral daily  =======================    ENDOCRINE  =====================  A1c 5.6   Hx of DMT2, glycemic control with Admelog sliding scale   Monitor blood glucose levels.     insulin lispro (ADMELOG) corrective regimen sliding scale   SubCutaneous every 6 hours  ========================INFECTIOUS DISEASE================  Afebrile, WBC within normal limits.   Monitor temperature and trend WBC.   : C.Diff positive requiring vancomycin     vancomycin    Solution 125 milliGRAM(s) Oral every 12 hours        I have spent 35 minutes providing acute care for this critically ill patient     Patient requires continuous monitoring with bedside rhythm monitoring, pulse ox monitoring, and intermittent blood gas analysis. Care plan discussed with ICU care team. Patient remained critical and at risk for life threatening decompensation.     By signing my name below, I, Emily Roa, attest that this documentation has been prepared under the direction and in the presence of Kota Thomas MD   Electronically signed: Cesia Cottrell, - @ 06:20    I, Kota Thomas, personally performed the services described in this documentation. all medical record entries made by the scribe were at my direction and in my presence. I have reviewed the chart and agree that the record reflects my personal performance and is accurate and complete  Electronically signed: Kota Thomas MD        RICKY HAMPTON  MRN-[id number]  Patient is a 66y old  Male who presents with a chief complaint of Anemia, Supratherapeutic INR, Dark Stools (15 Jul 2021 19:10)    HPI:  65M PMH ACC/AHA stage D HF due to NICM HM2 LVAD , TV annuloplasty ring 17 as destination therapy due to severe peripheral artery disease with significant stenosis  SIADH, Depression, CKD-3 with hyperkalemia, past E. coli UTIs, driveline drainage (21) and COVID-19 (back in 2020)  He was recently seen in clinic where he complained of abdominal pain and dark stools w constipation back in May. He presents to [facility] ER today weakness and fatigue, moderate and + Black stools for three days, on coumadin secondary to warfarin use in the setting of an LVAD. Patient has required transfusions for GIB in the past. Mostly recently back in 2021 pt had anemia with dark stools. No interventions was done at that time. However Last Endoscopy was done in 2020 (negative). Today labs show patient is anemic with H/H of 4.5/16.3,. INR is 8.84 MAP in the 90s, Temp 35.1. He denies any chest pain, shortness of breath, dizziness, abd pain, nausea or vomiting.       (2021 16:57)      Surgery/Hospital Course:   admit for melena w/ anemia, INR 8.84   6/15 Capsul study (+) for small bowel bleed, balloon endoscopy (old blood in prox ileum); post EGD - septic w/ L opacity, re-intubated for concern for aspiration, TTE (Mod MR, decrease biV w/ interventricular septum boweing towards R)   bronch    TC    CT C/A/P: Fluid filled colon which may be 2/2 rapid transit. Small bilateral pleffs with associates. Compressive atelectasis New ISABELLE & LLL  parenchymal opacities, suspicious for pneumonia. 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    #8 Shiley trach at bedside    CPAP trials    LVAD speed increased to 9200   Bronch; Central line dc'ed    Today:  Plan to place CVL for CVP monitoring. Wean trach as tolerated.    ICU Vital Signs Last 24 Hrs  T(C): 36.5 (2021 04:00), Max: 36.8 (15 Jul 2021 08:00)  T(F): 97.7 (2021 04:00), Max: 98.2 (15 Jul 2021 08:00)  HR: 92 (2021 06:00) (82 - 106)  BP: --  BP(mean): --  ABP: 120/86 (2021 06:00) (88/69 - 149/70)  ABP(mean): 97 (2021 06:00) (72 - 102)  RR: 22 (2021 06:00) (14 - 52)  SpO2: 95% (2021 06:00) (95% - 100%)      Physical Exam:  Gen:  Awake, alert   CNS: non focal 	  Neck: no JVD, +TC  RES : clear , no wheezing              CVS: Regular  rhythm. Normal S1/S2  Abd: Soft, non-distended. Bowel sounds present.  Skin: No rash.  Ext:  no edema    ============================I/O===========================   I&O's Detail    2021 07:01  -  15 Jul 2021 07:00  --------------------------------------------------------  IN:    Enteral Tube Flush: 30 mL    Heparin: 202 mL    Lactated Ringers: 1800 mL    Miscellaneous Tube Feedin mL  Total IN: 3412 mL    OUT:    Voided (mL): 1625 mL  Total OUT: 1625 mL    Total NET: 1787 mL      15 Jul 2021 07:01  -  2021 06:20  --------------------------------------------------------  IN:    Enteral Tube Flush: 10 mL    Heparin: 258 mL    Lactated Ringers: 225 mL    Miscellaneous Tube Feedin mL  Total IN: 1933 mL    OUT:    Voided (mL): 1725 mL  Total OUT: 1725 mL    Total NET: 208 mL        ============================ LABS =========================                        9.2    8.03  )-----------( 194      ( 2021 00:40 )             30.1     07-16    133<L>  |  97  |  18  ----------------------------<  126<H>  4.5   |  25  |  0.72    Ca    10.2      2021 00:40  Phos  3.0     07-16  Mg     2.0     07-16    TPro  8.8<H>  /  Alb  4.5  /  TBili  0.3  /  DBili  x   /  AST  25  /  ALT  16  /  AlkPhos  94  07-16    LIVER FUNCTIONS - ( 2021 00:40 )  Alb: 4.5 g/dL / Pro: 8.8 g/dL / ALK PHOS: 94 U/L / ALT: 16 U/L / AST: 25 U/L / GGT: x           PT/INR - ( 15 Jul 2021 14:23 )   PT: 22.7 sec;   INR: 1.95 ratio         PTT - ( 2021 03:32 )  PTT:58.5 sec  ABG - ( 2021 00:26 )  pH, Arterial: 7.41  pH, Blood: x     /  pCO2: 46    /  pO2: 80    / HCO3: 29    / Base Excess: 4.1   /  SaO2: 96                Urinalysis Basic - ( 2021 20:32 )    Color: Yellow / Appearance: Clear / S.023 / pH: x  Gluc: x / Ketone: Negative  / Bili: Negative / Urobili: Negative   Blood: x / Protein: 30 mg/dL / Nitrite: Negative   Leuk Esterase: Negative / RBC: 1 /hpf / WBC 2 /HPF   Sq Epi: x / Non Sq Epi: 0 /hpf / Bacteria: Negative      ======================Micro/Rad/Cardio=================  Culture: Reviewed   CXR: Reviewed  Echo: Reviewed  ======================================================  PAST MEDICAL & SURGICAL HISTORY:  CHF (congestive heart failure)    CAD (coronary artery disease)    Depression    Pleural effusion    History of 2019 novel coronavirus disease (COVID-19)  2020    Hemorrhoids    Bleeding hemorrhoids    Peripheral arterial disease    Claudication    BPH with urinary obstruction    ACC/AHA stage D systolic heart failure    Anticoagulation goal of INR 2.0 to 2.5    Falls    Clavicle fracture    CKD (chronic kidney disease), stage III    Iron deficiency anemia    H/O epistaxis    Vertigo    GI bleed    S/P TVR (tricuspid valve repair)    S/P ventricular assist device    S/P endoscopy      ====================ASSESSMENT ==============    Stage D Nonischemic Cardiomyopathy, Status Post HM2 on 2017   Recent driveline infection on 2021   Cardiogenic shock  Hemodynamic instability   Acute hypoxemic respiratory failure  GI bleed , Status Post Enteroscopy   Anemia, in setting of melena   Chronic Kidney Disease  Stress hyperglycemia   C.diff positive on    Hypovolemic shock  Hyponatremia   Leukocytosis    Plan:  ====================== NEUROLOGY=====================  Nonfocal, continue to monitor neuro status   Tylenol PRN for fevers/ analgesia   Klonopin for agitation   PT/ambulation as tolerated     acetaminophen    Suspension .. 650 milliGRAM(s) Oral every 6 hours PRN Mild Pain (1 - 3)  clonazePAM  Tablet 0.5 milliGRAM(s) Oral at bedtime  clonazePAM  Tablet 0.25 milliGRAM(s) Oral every 24 hours  ==================== RESPIRATORY======================  Acute hypoxemic resp failure s/p #8 shijett trach on on ; TC since , continue as tolerated.   Continue close monitoring of respiratory rate and breathing pattern, following of ABG’s with A-line monitoring, continuous pulse oximetry monitoring.     ====================CARDIOVASCULAR==================  Stage D Nonischemic Cardiomyopathy, Status Post HM2 on 2017; LVAD settings 9200 with flow of 5.  Echo 6/15: severe global Left ventricular systolic dysfunction with HM2 in place, decreased Right ventricular systolic function, interventricular septums bows slightly to right, otherwise grossly similar to prior.   Invasive hemodynamic monitoring, assess perfusion indices.   Continue rate control with Lopressor and Amiodarone   Pressor support with Hydralazine   Plan to place CVL for CVP monitoring    aMIOdarone    Tablet 200 milliGRAM(s) Oral daily  hydrALAZINE 25 milliGRAM(s) Oral every 8 hours PRN MAP greater than 85  metoprolol tartrate 25 milliGRAM(s) Oral every 12 hours  ===================HEMATOLOGIC/ONC ===================  Anemia due to acute blood loss associated with small bowel bleed;   monitor H&H/Plts  Continue AC therapy with IV Heparin for LVAD circuit and VTE prophylaxis for goal INR 2-3    heparin  Infusion 1000 Unit(s)/Hr (10 mL/Hr) IV Continuous <Continuous>  ===================== RENAL =========================  Continue to monitor I/Os, BUN/Creatinine, and urine output.   Goal net negative fluid balance. Replete lytes PRN. Keep K> 4 and Mg >2.   ==================== GASTROINTESTINAL===================  Tolerating TF, Jevity 1.2 Tank @ goal 60 cc/hr.   Continue Protonix for stress ulcer prophylaxis.     pantoprazole   Suspension 40 milliGRAM(s) Enteral Tube daily  psyllium Powder 1 Packet(s) Oral daily  =======================    ENDOCRINE  =====================  A1c 5.6   Hx of DMT2, glycemic control with Admelog sliding scale   Monitor blood glucose levels.     insulin lispro (ADMELOG) corrective regimen sliding scale   SubCutaneous every 6 hours  ========================INFECTIOUS DISEASE================  Afebrile, WBC within normal limits.   Monitor temperature and trend WBC.   : C.Diff positive requiring vancomycin     vancomycin    Solution 125 milliGRAM(s) Oral every 12 hours        I have spent 35 minutes providing acute care for this critically ill patient     Patient requires continuous monitoring with bedside rhythm monitoring, pulse ox monitoring, and intermittent blood gas analysis. Care plan discussed with ICU care team. Patient remained critical and at risk for life threatening decompensation.     By signing my name below, I, Emily Roa, attest that this documentation has been prepared under the direction and in the presence of Kota Thomas MD   Electronically signed: Cesia Cottrell, 21 @ 06:20    I, Kota Thomas, personally performed the services described in this documentation. all medical record entries made by the scribe were at my direction and in my presence. I have reviewed the chart and agree that the record reflects my personal performance and is accurate and complete  Electronically signed: Kota Thomas MD

## 2021-07-16 NOTE — PROGRESS NOTE ADULT - SUBJECTIVE AND OBJECTIVE BOX
Patient seen and examined at bedside.    Overnight Events: No acute events overnight, clinically stable.     Review Of Systems: No chest pain, shortness of breath, or palpitations            Current Meds:  acetaminophen    Suspension .. 650 milliGRAM(s) Oral every 6 hours PRN  aMIOdarone    Tablet 200 milliGRAM(s) Oral daily  chlorhexidine 2% Cloths 1 Application(s) Topical <User Schedule>  clonazePAM  Tablet 0.5 milliGRAM(s) Oral at bedtime  clonazePAM  Tablet 0.25 milliGRAM(s) Oral every 24 hours  heparin  Infusion 1000 Unit(s)/Hr IV Continuous <Continuous>  hydrALAZINE 25 milliGRAM(s) Oral every 8 hours PRN  insulin lispro (ADMELOG) corrective regimen sliding scale   SubCutaneous every 6 hours  metoprolol tartrate 25 milliGRAM(s) Oral every 12 hours  pantoprazole   Suspension 40 milliGRAM(s) Enteral Tube daily  psyllium Powder 1 Packet(s) Oral daily  vancomycin    Solution 125 milliGRAM(s) Oral every 12 hours    Vitals:  T(F): 97.7 (07-16), Max: 98.2 (07-15)  HR: 88 (07-16) (82 - 106)  BP: --  RR: 29 (07-16)  SpO2: 98% (07-16)  I&O's Summary    15 Jul 2021 07:01  -  16 Jul 2021 07:00  --------------------------------------------------------  IN: 1933 mL / OUT: 1725 mL / NET: 208 mL    Physical Exam:  Appearance: No acute distress; well appearing  Cardiovascular: RRR, S1, S2, no murmurs, rubs, or gallops; no edema; no JVD  Respiratory: Clear to auscultation bilaterally  Gastrointestinal: soft, non-tender, non-distended with normal bowel sounds  Musculoskeletal: No clubbing; no joint deformity   Neurologic: Non-focal  Psychiatry: AAOx3, mood & affect appropriate  Skin: No rashes, ecchymoses, or cyanosis                          9.2    8.03  )-----------( 194      ( 16 Jul 2021 00:40 )             30.1     07-16    133<L>  |  97  |  18  ----------------------------<  126<H>  4.5   |  25  |  0.72    Ca    10.2      16 Jul 2021 00:40  Phos  3.0     07-16  Mg     2.0     07-16    TPro  8.8<H>  /  Alb  4.5  /  TBili  0.3  /  DBili  x   /  AST  25  /  ALT  16  /  AlkPhos  94  07-16    PT/INR - ( 15 Jul 2021 14:23 )   PT: 22.7 sec;   INR: 1.95 ratio      PTT - ( 16 Jul 2021 03:32 )  PTT:58.5 sec

## 2021-07-16 NOTE — PROGRESS NOTE ADULT - PROBLEM SELECTOR PLAN 1
- tolerating metoprolol tartrate 25 bid, transition to toprol when tolerating PO  - off losartan, hydral prn    - hold diuretics in the setting of fluid losses   - Continue home amiodarone 200mg PO daily  - hold IV fluids   - CVL for CVP monitoring, 3-7

## 2021-07-17 NOTE — PROGRESS NOTE ADULT - SUBJECTIVE AND OBJECTIVE BOX
RICKY HAMPTON  MRN-[id number]  Patient is a 66y old  Male who presents with a chief complaint of Anemia, Supratherapeutic INR, Dark Stools (15 Jul 2021 19:10)    HPI:  65M PMH ACC/AHA stage D HF due to NICM HM2 LVAD , TV annuloplasty ring 17 as destination therapy due to severe peripheral artery disease with significant stenosis  SIADH, Depression, CKD-3 with hyperkalemia, past E. coli UTIs, driveline drainage (21) and COVID-19 (back in 2020)  He was recently seen in clinic where he complained of abdominal pain and dark stools w constipation back in May. He presents to [facility] ER today weakness and fatigue, moderate and + Black stools for three days, on coumadin secondary to warfarin use in the setting of an LVAD. Patient has required transfusions for GIB in the past. Mostly recently back in 2021 pt had anemia with dark stools. No interventions was done at that time. However Last Endoscopy was done in 2020 (negative). Today labs show patient is anemic with H/H of 4.5/16.3,. INR is 8.84 MAP in the 90s, Temp 35.1. He denies any chest pain, shortness of breath, dizziness, abd pain, nausea or vomiting.       (2021 16:57)      Surgery/Hospital Course:   admit for melena w/ anemia, INR 8.84   6/15 Capsul study (+) for small bowel bleed, balloon endoscopy (old blood in prox ileum); post EGD - septic w/ L opacity, re-intubated for concern for aspiration, TTE (Mod MR, decrease biV w/ interventricular septum boweing towards R)   bronch    TC    CT C/A/P: Fluid filled colon which may be 2/2 rapid transit. Small bilateral pleffs with associates. Compressive atelectasis New ISABELLE & LLL  parenchymal opacities, suspicious for pneumonia. 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    #8 Shiley trach at bedside    CPAP trials    LVAD speed increased to 9200   Bronch; Central line dc'ed    ICU Vital Signs Last 24 Hrs  T(C): 36.8 (2021 08:00), Max: 37 (2021 12:00)  T(F): 98.3 (2021 08:00), Max: 98.6 (2021 12:00)  HR: 103 (2021 09:21) (85 - 112)  BP: --  BP(mean): 84 (2021 20:00) (84 - 90)  ABP: 109/73 (2021 09:00) (85/61 - 135/90)  ABP(mean): 85 (2021 09:00) (70 - 111)  RR: 17 (2021 09:21) (6 - 38)  SpO2: 100% (2021 09:21) (96% - 100%)        Physical Exam:  Gen:  Awake, alert   CNS: non focal 	  Neck: no JVD, +TC  RES : clear , no wheezing              CVS: Regular  rhythm. Normal S1/S2  Abd: Soft, non-distended. Bowel sounds present.  Skin: No rash.  Ext:  no edema    ============================I/O===========================  I&O's Detail    2021 07:01  -  2021 07:00  --------------------------------------------------------  IN:    Enteral Tube Flush: 343 mL    Heparin: 170 mL    IV PiggyBack: 100 mL    Miscellaneous Tube Feedin mL  Total IN: 1263 mL    OUT:    Emesis (mL): 200 mL    Voided (mL): 1575 mL  Total OUT: 1775 mL    Total NET: -512 mL      2021 07:01  -  2021 09:47  --------------------------------------------------------  IN:    Heparin: 30 mL    Miscellaneous Tube Feedin mL  Total IN: 210 mL    OUT:    Voided (mL): 175 mL  Total OUT: 175 mL    Total NET: 35 mL    ============================ LABS =========================                        9.7    6.79  )-----------(  01:25 )             32.0   -17    133<L>  |  96  |  14  ----------------------------<  99  4.3   |  24  |  0.79    Ca    10.2      2021 01:25  Phos  3.4       Mg     1.9         TPro  9.0<H>  /  Alb  4.5  /  TBili  0.3  /  DBili  x   /  AST  28  /  ALT  21  /  AlkPhos  95  17      ======================Micro/Rad/Cardio=================  Culture: Reviewed   CXR: Reviewed  Echo: Reviewed  ======================================================  PAST MEDICAL & SURGICAL HISTORY:  CHF (congestive heart failure)    CAD (coronary artery disease)    Depression    Pleural effusion    History of 2019 novel coronavirus disease (COVID-19)  2020    Hemorrhoids    Bleeding hemorrhoids    Peripheral arterial disease    Claudication    BPH with urinary obstruction    ACC/AHA stage D systolic heart failure    Anticoagulation goal of INR 2.0 to 2.5    Falls    Clavicle fracture    CKD (chronic kidney disease), stage III    Iron deficiency anemia    H/O epistaxis    Vertigo    GI bleed    S/P TVR (tricuspid valve repair)    S/P ventricular assist device    S/P endoscopy      ====================ASSESSMENT ==============    Stage D Nonischemic Cardiomyopathy, Status Post HM2 on 2017   Recent driveline infection on 2021   Cardiogenic shock  Hemodynamic instability   Acute hypoxemic respiratory failure  GI bleed , Status Post Enteroscopy   Anemia, in setting of melena   Chronic Kidney Disease  Stress hyperglycemia   C.diff positive on    Hypovolemic shock  Hyponatremia   Leukocytosis    Plan:  ====================== NEUROLOGY=====================  Nonfocal, continue to monitor neuro status   Tylenol PRN for fevers/ analgesia   Klonopin for agitation   PT/ambulation as tolerated     acetaminophen    Suspension .. 650 milliGRAM(s) Oral every 6 hours PRN Mild Pain (1 - 3)  clonazePAM  Tablet 0.5 milliGRAM(s) Oral at bedtime  clonazePAM  Tablet 0.25 milliGRAM(s) Oral every 24 hours  ==================== RESPIRATORY======================  Acute hypoxemic resp failure s/p #8 shiley trach on on ; TC since , continue as tolerated.   Continue close monitoring of respiratory rate and breathing pattern, following of ABG’s with A-line monitoring, continuous pulse oximetry monitoring.     ====================CARDIOVASCULAR==================  Stage D Nonischemic Cardiomyopathy, Status Post HM2 on 2017; LVAD settings 9200 with flow of 5.  Echo 6/15: severe global Left ventricular systolic dysfunction with HM2 in place, decreased Right ventricular systolic function, interventricular septums bows slightly to right, otherwise grossly similar to prior.   Invasive hemodynamic monitoring, assess perfusion indices.   Continue rate control with Lopressor and Amiodarone   Pressor support with Hydralazine   Plan to place CVL for CVP monitoring    aMIOdarone    Tablet 200 milliGRAM(s) Oral daily  hydrALAZINE 25 milliGRAM(s) Oral every 8 hours PRN MAP greater than 85  metoprolol tartrate 25 milliGRAM(s) Oral every 12 hours  ===================HEMATOLOGIC/ONC ===================  Anemia due to acute blood loss associated with small bowel bleed;   monitor H&H/Plts  Continue AC therapy with IV Heparin for LVAD circuit and VTE prophylaxis for goal INR 2-3    heparin  Infusion 1000 Unit(s)/Hr (10 mL/Hr) IV Continuous <Continuous>  ===================== RENAL =========================  Continue to monitor I/Os, BUN/Creatinine, and urine output.   Goal net negative fluid balance. Replete lytes PRN. Keep K> 4 and Mg >2.   ==================== GASTROINTESTINAL===================  Tolerating TF, Jevity 1.2 Tank @ goal 60 cc/hr.   Continue Protonix for stress ulcer prophylaxis.     pantoprazole   Suspension 40 milliGRAM(s) Enteral Tube daily  psyllium Powder 1 Packet(s) Oral daily  =======================    ENDOCRINE  =====================  A1c 5.6   Hx of DMT2, glycemic control with Admelog sliding scale   Monitor blood glucose levels.     insulin lispro (ADMELOG) corrective regimen sliding scale   SubCutaneous every 6 hours  ========================INFECTIOUS DISEASE================  Afebrile, WBC within normal limits.   Monitor temperature and trend WBC.   : C.Diff positive requiring vancomycin     vancomycin    Solution 125 milliGRAM(s) Oral every 12 hours    I have spent 35 minutes providing acute care for this critically ill patient     Patient requires continuous monitoring with bedside rhythm monitoring, pulse ox monitoring, and intermittent blood gas analysis. Care plan discussed with ICU care team. Patient remained critical and at risk for life threatening decompensation.

## 2021-07-17 NOTE — PROGRESS NOTE ADULT - ASSESSMENT
Assessment and Recommendation:   · Assessment	  Assessment and recommendation :  Recurrent Acute hypoxic respiratory Failure S/P tracheostomy on full ventilatory support alternating with trach. collar at 50% Fi02  Acute left lower lobe pneumonia  possible Aspiration pneumonia   Stool is  +ve for C-diff. colitis on PO vancomycin  improved   nutrition not appreciated S/P emesis last night   S/P  Ancef improved for bacteremia   Non ischemic cardiomyopathy continue ACE inhibitor and B-Blockers   S/P Septic shock and cardiogenic shock   Stage D systolic heart failure S/P LVAD HM2   MH2 LVAD  with  TV Annuloplasty  Severe peripheral vascular disease   S/P Anion gap metabolic acidosis  severe hyperglycemia on insulin coverage    On  Amiodarone and IV heparin   critical care polyneuropathy   Anemia of Acute blood Loss   S/P Small bowel bleeding   S/P blood and FFP transfusion   Chronic kidney disease stage III  Continue NGT feeding   PT and OT   GI prophylaxis  discuss with TCV surgeon

## 2021-07-17 NOTE — PROGRESS NOTE ADULT - SUBJECTIVE AND OBJECTIVE BOX
RICKY JOINT  MRN#: [id number]  Subjective:  Pulmonary progress  : recurrent Acute hypoxic respiratory Failure ,aspiration pneumonia, NICM  , chart reviewed and H/O obtained radiological and Laboratory study reviewed patient Examined     65M PMH ACC/AHA stage D HF due to NICM HM2 LVAD , TV annuloplasty ring 17 as destination therapy due to severe peripheral artery disease with significant stenosis  SIADH, Depression, CKD-3 with hyperkalemia, past E. coli UTIs, driveline drainage (21) and COVID-19 (back in 2020)  He was recently seen in clinic where he complained of abdominal pain and dark stools w constipation back in May. He presents to [facility] ER today weakness and fatigue, moderate and + Black stools for three days, on coumadin secondary to warfarin use in the setting of an LVAD. Patient has required transfusions for GIB in the past. Mostly recently back in 2021 pt had anemia with dark stools. No interventions was done at that time. However Last Endoscopy was done in 2020 (negative). Today labs show patient is anemic with H/H of 4.5/16.3,. INR is 8.84 MAP in the 90s, Temp 35.1. He denies any chest pain, shortness of breath, dizziness, abd pain, nausea or vomiting. found to have  rectal bleeding underwent endoscopy ,old blood in the proximal ileum ,  develop sepsis with LL opacity given Antibiotics , Extubated , reintubated , Bronchoscopy on Zosyn for LL pneumonia  and Amiodarone S/P TV Annuloplasty , patient remain intubated on full ventilatory support .S/P multiple units of blood transfusion , remain on full ventilatory support on Precedex and propofol , new central IJ line , diarrhea C diff. +ve on po vancomycin and IV Flagyl,  mildly distended belly , fever start on cefepime 2gm q 8 hrs S/P tracheostomy .  new RT Subclavian central line continue on contact  isolation ,still diarrhea on C-diff antibiotics ENT follow up appreciated , trial of C-PAP as tolerated , , copious secretion from trach. site chest x ray left lower lobe pneumonia , tolerating trch. collar 50% FI02 still excessive secretion need pulmonary toilet , off Ancef antibiotic , no more diarrhea back on full support mechanical ventilator , chest x ray show improvement in LLL air space disease, more awake and responsive on tube feeding no more diarrhea , S/P  Ancef for bacteremia no fever ,ID follow up noted ,  no nausea or vomiting or diarrhea still very weak and tired , event note pulled NG tube now replaced , back on tube feeding ,still on po vancomycin , getting PT and OT at bed side          (2021 16:57)    PAST MEDICAL & SURGICAL HISTORY:  CHF (congestive heart failure)    CAD (coronary artery disease)  Depression    Pleural effusion    History of 2019 novel coronavirus disease (COVID-19)  2020    Hemorrhoids    Bleeding hemorrhoids    Peripheral arterial disease    Claudication    BPH with urinary obstruction    ACC/AHA stage D systolic heart failure  Anticoagulation goal of INR 2.0 to 2.5    Falls    Clavicle fracture    CKD (chronic kidney disease), stage III    Iron deficiency anemia    H/O epistaxis    Vertigo    GI bleed    S/P TVR (tricuspid valve repair)    S/P ventricular assist device    S/P endoscopy    OBJECTIVE:  ICU Vital Signs Last 24 Hrs  T(C): 38.2 (2021 10:00), Max: 38.5 (2021 12:00)  T(F): 100.8 (2021 10:00), Max: 101.3 (2021 12:00)  HR: 65 (2021 10:00) (61 - 69)  BP: --  BP(mean): --  ABP: 105/67 (2021 10:00) (90/54 - 113/64)  ABP(mean): 77 (2021 10:00) (63 - 77)  RR: 20 (2021 10:00) (19 - 35)  SpO2: 99% (2021 10:00) (96% - 100%)       @ 07:  -   @ 07:00  --------------------------------------------------------  IN: 2693.9 mL / OUT: 1415 mL / NET: 1278.9 mL     @ 07: @ 10:49  --------------------------------------------------------  IN: 420.8 mL / OUT: 115 mL / NET: 305.8 mL  PHYSICAL EXAM:Daily   Elderly male S/P tracheostomy   on full ventilatory support /40%20/5cm PEEP  0n vasopressin , alternating with trach. collar on IV heparin   Daily Weight in k.4 (2021 00:00)  HEENT:     + NCAT  + EOMI  - Conjuctival edema   - Icterus   - Thrush   - ETT  + NGT/OGT  Neck:         + FROM   RT SC line  JVD     - Nodes     - Masses    + Mid-line trachea + Tracheostomy  Chest: normal findings  Lungs:          + CTA   + Rhonchi    - Rales    - Wheezing + Decreased  LT BS   - Dullness R L  Cardiac:       + S1 + S2    + RRR   - Irregular   - S3  - S4    - Murmurs   - Rub   - Hamman’s sign   Abdomen:    + BS     + Soft    + Non-tender     - Distended    - Organomegaly  - PEG  Extremities:   - Cyanosis U/L   - Clubbing  U/L  + LE/UE Edema   + Capillary refill    + Pulses   Neuro:        - Awake   -  Alert   - Confused   - Lethargic   + Sedated  + Generalized Weakness  Skin:        - Rashes    - Erythema   + Normal incisions   + IV sites intact          HOSPITAL MEDICATIONS: All mediciations reviewed and analyzed  MEDICATIONS  (STANDING):  aMIOdarone    Tablet 200 milliGRAM(s) Oral daily  chlorhexidine 0.12% Liquid 15 milliLiter(s) Oral Mucosa every 12 hours  chlorhexidine 2% Cloths 1 Application(s) Topical <User Schedule>  dexMEDEtomidine Infusion 0.5 MICROgram(s)/kG/Hr (9.81 mL/Hr) IV Continuous <Continuous>  dextrose 50% Injectable 50 milliLiter(s) IV Push every 15 minutes  heparin  Infusion 400 Unit(s)/Hr (12.5 mL/Hr) IV Continuous <Continuous>  HYDROmorphone  Injectable 0.5 milliGRAM(s) IV Push once  insulin lispro (ADMELOG) corrective regimen sliding scale   SubCutaneous every 6 hours  pantoprazole  Injectable 40 milliGRAM(s) IV Push every 12 hours  piperacillin/tazobactam IVPB.. 3.375 Gram(s) IV Intermittent every 8 hours  propofol Infusion 20 MICROgram(s)/kG/Min (9.42 mL/Hr) IV Continuous <Continuous>  sodium chloride 0.9% lock flush 3 milliLiter(s) IV Push every 8 hours  sodium chloride 0.9%. 1000 milliLiter(s) (10 mL/Hr) IV Continuous <Continuous>    MEDICATIONS  (PRN):  acetaminophen    Suspension .. 650 milliGRAM(s) Oral every 6 hours PRN Temp greater or equal to 38C (100.4F)    LABS: All Lab data reviewed and analyzed                                    9.7    6.79  )-----------(       ( 2021 01:25 )             32.0     07-17    133<L>  |  96  |  14  ----------------------------<  99  4.3   |  24  |  0.79    Ca    10.2      2021 01:25  Phos  3.4     07-17  Mg     1.9     07-17    TPro  9.0<H>  /  Alb  4.5  /  TBili  0.3  /  DBili  x   /  AST  28  /  ALT  21  /  AlkPhos  95  07-17    Ca    10.2      2021 00:40  Phos  3.0     07-16  Mg     2.0     07-16    TPro  8.8<H>  /  Alb  4.5  /  TBili  0.3  /  DBili  x   /  AST  25  /  ALT  16  /  AlkPhos  94  07-16      Ca    10.1      15 Jul 2021 00:37  Phos  2.8     07-15  Mg     2.4     07-15    TPro  8.6<H>  /  Alb  4.4  /  TBili  0.3  /  DBili  x   /  AST  22  /  ALT  10  /  AlkPhos  85  07-15      Ca    10.5      2021 00:43  Phos  4.1     07-14  Mg     2.4     07-14    TPro  9.1<H>  /  Alb  5.5<H>  /  TBili  0.8  /  DBili  x   /  AST  16  /  ALT  10  /  AlkPhos  81  07-14      Ca    10.5      2021 00:39  Phos  4.6     07-13  Mg     2.4     07-13    TPro  9.9<H>  /  Alb  4.5  /  TBili  0.4  /  DBili  x   /  AST  22  /  ALT  10  /  AlkPhos  121<H>  07-13                                           PTT - ( 2021 04:52 )  PTT:45.2 sec LIVER FUNCTIONS - ( 2021 00:42 )  Alb: 3.4 g/dL / Pro: 6.7 g/dL / ALK PHOS: 213 U/L / ALT: 15 U/L / AST: 24 U/L / GGT: x           RADIOLOGY: - Reviewed and analyzed LLL  pneumonia , LVAD HM2, CT scan of abdomen reviewed result noted

## 2021-07-18 NOTE — PROGRESS NOTE ADULT - PROBLEM SELECTOR PLAN 5
- in s/o diarrhea, NPO, diuresis   - improved w/ addition of fluids   - trend Cr, avoid nephrotoxins

## 2021-07-18 NOTE — PROGRESS NOTE ADULT - ATTENDING COMMENTS
making good progress.  1X bm. tolerating feeds.   sitting in chair interactive.   meds; heparin (PTT 68), PO vanco, amnio 200, lopressor 25 bid, coumadin (INR 1.8),   LVAD 9200, Flow 4.8, PI 6.3  HR , MAPs 82-84, afebrile  I/o +424  07-18  134<L>  |  96  |  14  ---------------------------<  134<H>  4.3   |  26  |  0.74  Ca    10.7<H>      18 Jul 2021 01:04  Phos  3.5     07-18  Mg     2.0     07-18  TPro  9.5<H>  /  Alb  4.4  /  TBili  0.3  /  DBili  x   /  AST  26  /  ALT  23  /  AlkPhos  108  07-18                        10.5   6.51  )-----------( 246      ( 18 Jul 2021 01:04 )             34.3   .   continue to ambulate.   hopefully this week work on trach down sizing.   to discuss need for isolation and ongoing PO Vanco on MDR tomorrow.  Could d/c heparin IV when INR > 1.8  Zi Werner

## 2021-07-18 NOTE — PROGRESS NOTE ADULT - SUBJECTIVE AND OBJECTIVE BOX
CRITICAL CARE ATTENDING - CTICU    MEDICATIONS  (STANDING):  aMIOdarone    Tablet 200 milliGRAM(s) Oral daily  chlorhexidine 2% Cloths 1 Application(s) Topical <User Schedule>  clonazePAM  Tablet 0.5 milliGRAM(s) Oral at bedtime  heparin  Infusion 1000 Unit(s)/Hr (9 mL/Hr) IV Continuous <Continuous>  insulin lispro (ADMELOG) corrective regimen sliding scale   SubCutaneous every 6 hours  metoprolol tartrate 25 milliGRAM(s) Oral every 12 hours  pantoprazole   Suspension 40 milliGRAM(s) Enteral Tube daily  psyllium Powder 1 Packet(s) Oral daily  vancomycin    Solution 125 milliGRAM(s) Oral every 12 hours                                    10.5   6.51  )-----------( 246      ( 2021 01:04 )             34.3       07-18    134<L>  |  96  |  14  ----------------------------<  134<H>  4.3   |  26  |  0.74    Ca    10.7<H>      2021 01:04  Phos  3.5     0718  Mg     2.0     18    TPro  9.5<H>  /  Alb  4.4  /  TBili  0.3  /  DBili  x   /  AST  26  /  ALT  23  /  AlkPhos  108  0718      PT/INR - ( 2021 15:08 )   PT: 20.9 sec;   INR: 1.79 ratio         PTT - ( 2021 01:04 )  PTT:67.3 sec        Daily     Daily Weight in k.8 (2021 00:00)       @ : @ 07:00  --------------------------------------------------------  IN: 1974 mL / OUT: 1550 mL / NET: 424 mL    18 @ 07:01  -   @ 08:04  --------------------------------------------------------  IN: 69 mL / OUT: 0 mL / NET: 69 mL        Critically Ill patient  : [ ] preoperative ,   [ ] post operative    [x] non operative    Requires :  [] Arterial Line   [ ] Central Line  [ ] PA catheter  [ ] IABP  [ ] ECMO  [x] LVAD  [ ] Ventilator  [ ] pacemaker [ ] Impella.                      [x] ABG's     [x] Pulse Oxymetry Monitoring  Bedside evaluation , monitoring , treatment of hemodynamics , fluids , IVP/ IVCD meds.        Diagnosis:     Admitted to CTU on  for Melena with anemia  /  LVAD Patient    POD  - Tracheostomy       CHF- acute [x]   chronic [x]    systolic [x]   diatolic [ ]          - Echo- EF -  BiV           [x] RV dysfunction          - Cxr-cardiomegally, edema          - Clinical-  [ ]inotropes   [ ]pressors   [x]diuresis   [ ]IABP   [ ]ECMO   [x]LVAD   [x]Respiratory Failure.     Respiratory Failure     LVAD Circuit    GI bleed s/p Enteroscopy on     Ventilator Management:  [x ]AC-rest    [x ]CPAP-PS Wean    [x ]Trach Collar     [ ]Extubate    [ ] T-Piece  [ ]peep>5     Hypovolemia     Anemia in setting of Melena     Chronic Kidney Disease     IVCD anticoagulation with [x ] Heparin  [ ] Argatroban for LVAD Circuit     Difficult weaning process - multiple organ system involvement in critically ill patient    C Diff positive on     DMT2 - Sliding Scale     Hyponatremia     Requires chest PT, pulmonary toilet, ambu bagging, suctioning to maintain SaO2,  patent airway and treat atelectasis.    Requires bedside physical therapy, mobilization and total residential care.    Tolerates NG / NJ feeds at [x] goal rate    [ ] trophic rate    [ ]       rate           I, Luis Fernando Thompson, personally performed the services described in this documentation. All medical record entries made by the luisibmel were at my direction and in my presence. I have reviewed the chart and agree that the record reflects my personal performance and is accurate and complete.   Luis Fernando Thompson MD.       By signing my name below, I, Brian Robin, attest that this documentation has been prepared under the direction and in the presence of Luis Fernando Thompson MD.   Electronically Signed: Cesia Gonzalez 21 @ 08:04        Discussed with CT surgeon, Physician Assistant - Nurse Practitioner- Critical care medicine team.   Dicussed at  AM / PM rounds.   Chart, labs , films reviewed.    Cumulative Critical Care Time Given Today:  CRITICAL CARE ATTENDING - CTICU    MEDICATIONS  (STANDING):  aMIOdarone    Tablet 200 milliGRAM(s) Oral daily  chlorhexidine 2% Cloths 1 Application(s) Topical <User Schedule>  clonazePAM  Tablet 0.5 milliGRAM(s) Oral at bedtime  heparin  Infusion 1000 Unit(s)/Hr (9 mL/Hr) IV Continuous <Continuous>  insulin lispro (ADMELOG) corrective regimen sliding scale   SubCutaneous every 6 hours  metoprolol tartrate 25 milliGRAM(s) Oral every 12 hours  pantoprazole   Suspension 40 milliGRAM(s) Enteral Tube daily  psyllium Powder 1 Packet(s) Oral daily  vancomycin    Solution 125 milliGRAM(s) Oral every 12 hours                                    10.5   6.51  )-----------( 246      ( 2021 01:04 )             34.3       07-18    134<L>  |  96  |  14  ----------------------------<  134<H>  4.3   |  26  |  0.74    Ca    10.7<H>      2021 01:04  Phos  3.5     0718  Mg     2.0     18    TPro  9.5<H>  /  Alb  4.4  /  TBili  0.3  /  DBili  x   /  AST  26  /  ALT  23  /  AlkPhos  108  0718      PT/INR - ( 2021 15:08 )   PT: 20.9 sec;   INR: 1.79 ratio         PTT - ( 2021 01:04 )  PTT:67.3 sec        Daily     Daily Weight in k.8 (2021 00:00)       @ : @ 07:00  --------------------------------------------------------  IN: 1974 mL / OUT: 1550 mL / NET: 424 mL    18 @ 07:01  -   @ 08:04  --------------------------------------------------------  IN: 69 mL / OUT: 0 mL / NET: 69 mL        Critically Ill patient  : [ ] preoperative ,   [ ] post operative    [x] non operative    Requires :  [] Arterial Line   [ ] Central Line  [ ] PA catheter  [ ] IABP  [ ] ECMO  [x] LVAD  [ x] Ventilator  [ ] pacemaker [ ] Impella.                      [x] ABG's     [x] Pulse Oxymetry Monitoring  Bedside evaluation , monitoring , treatment of hemodynamics , fluids , IVP/ IVCD meds.        Diagnosis:     Admitted to CTU on  for Melena with anemia  /  LVAD Patient    POD  - Tracheostomy       CHF- acute [x]   chronic [x]    systolic [x]   diatolic [ ]          - Echo- EF -  BiV           [x] RV dysfunction          - Cxr-cardiomegally, edema          - Clinical-  [ ]inotropes   [ ]pressors   [x]diuresis   [ ]IABP   [ ]ECMO   [x]LVAD   [x]Respiratory Failure.     Respiratory Failure     LVAD Circuit    GI bleed s/p Enteroscopy on     Ventilator Management:  [x ]AC-rest    [x ]CPAP-PS Wean    [x ]Trach Collar     [ ]Extubate    [ ] T-Piece  [ ]peep>5     Hypovolemia     Anemia in setting of Melena     Chronic Kidney Disease     IVCD anticoagulation with [x ] Heparin  [ ] Argatroban for LVAD Circuit     Difficult weaning process - multiple organ system involvement in critically ill patient    C Diff positive on     DMT2 - Sliding Scale     Hyponatremia     Requires chest PT, pulmonary toilet, ambu bagging, suctioning to maintain SaO2,  patent airway and treat atelectasis.    Requires bedside physical therapy, mobilization and total longterm care.    Tolerates NG / NJ feeds at [x] goal rate    [ ] trophic rate    [ ]       rate           I, Luis Fernando Thompson, personally performed the services described in this documentation. All medical record entries made by the luisibe were at my direction and in my presence. I have reviewed the chart and agree that the record reflects my personal performance and is accurate and complete.   Luis Fernando Thompson MD.       By signing my name below, I, Brian Robin, attest that this documentation has been prepared under the direction and in the presence of Luis Fernando Thompson MD.   Electronically Signed: Cesia Gonzalez 21 @ 08:04        Discussed with CT surgeon, Physician Assistant - Nurse Practitioner- Critical care medicine team.   Dicussed at  AM / PM rounds.   Chart, labs , films reviewed.    Cumulative Critical Care Time Given Today:  30 min

## 2021-07-18 NOTE — PROGRESS NOTE ADULT - PROBLEM SELECTOR PLAN 2
-Current speed 9200 RPM  -c/w heparin with coumadin bridge for goal INR 2-3 (can d/c IV heparin when INR >1.8)  -Will plan to hold aspirin indefinitely given recurrent GIB.  -Continue to monitor LDH daily.

## 2021-07-18 NOTE — PROGRESS NOTE ADULT - PROBLEM SELECTOR PLAN 1
- tolerating metoprolol tartrate 25 bid, transition to toprol when tolerating PO  - hold diuretics in the setting of fluid losses   - Continue home amiodarone 200 mg PO daily

## 2021-07-18 NOTE — PROGRESS NOTE ADULT - SUBJECTIVE AND OBJECTIVE BOX
RICKY JOINT  MRN#: [id number]  Subjective:  Pulmonary progress  : recurrent Acute hypoxic respiratory Failure ,aspiration pneumonia, NICM  , chart reviewed and H/O obtained radiological and Laboratory study reviewed patient Examined     65M PMH ACC/AHA stage D HF due to NICM HM2 LVAD , TV annuloplasty ring 17 as destination therapy due to severe peripheral artery disease with significant stenosis  SIADH, Depression, CKD-3 with hyperkalemia, past E. coli UTIs, driveline drainage (21) and COVID-19 (back in 2020)  He was recently seen in clinic where he complained of abdominal pain and dark stools w constipation back in May. He presents to [facility] ER today weakness and fatigue, moderate and + Black stools for three days, on coumadin secondary to warfarin use in the setting of an LVAD. Patient has required transfusions for GIB in the past. Mostly recently back in 2021 pt had anemia with dark stools. No interventions was done at that time. However Last Endoscopy was done in 2020 (negative). Today labs show patient is anemic with H/H of 4.5/16.3,. INR is 8.84 MAP in the 90s, Temp 35.1. He denies any chest pain, shortness of breath, dizziness, abd pain, nausea or vomiting. found to have  rectal bleeding underwent endoscopy ,old blood in the proximal ileum ,  develop sepsis with LL opacity given Antibiotics , Extubated , reintubated , Bronchoscopy on Zosyn for LL pneumonia  and Amiodarone S/P TV Annuloplasty , patient remain intubated on full ventilatory support .S/P multiple units of blood transfusion , remain on full ventilatory support on Precedex and propofol , new central IJ line , diarrhea C diff. +ve on po vancomycin and IV Flagyl,  mildly distended belly , fever start on cefepime 2gm q 8 hrs S/P tracheostomy .  new RT Subclavian central line continue on contact  isolation ,still diarrhea on C-diff antibiotics ENT follow up appreciated , trial of C-PAP as tolerated , , copious secretion from trach. site chest x ray left lower lobe pneumonia , tolerating trch. collar 50% FI02 still excessive secretion need pulmonary toilet , off Ancef antibiotic , no more diarrhea back on full support mechanical ventilator , chest x ray show improvement in LLL air space disease, more awake and responsive on tube feeding no more diarrhea , S/P  Ancef for bacteremia no fever ,ID follow up noted ,  no nausea or vomiting or diarrhea still very weak and tired , event note pulled NG tube now replaced , back on tube feeding ,still on po vancomycin , getting PT and OT at bed side , no plan for decannulation for now.          (2021 16:57)    PAST MEDICAL & SURGICAL HISTORY:  CHF (congestive heart failure)    CAD (coronary artery disease)  Depression    Pleural effusion    History of 2019 novel coronavirus disease (COVID-19)  2020    Hemorrhoids    Bleeding hemorrhoids    Peripheral arterial disease    Claudication    BPH with urinary obstruction    ACC/AHA stage D systolic heart failure  Anticoagulation goal of INR 2.0 to 2.5    Falls    Clavicle fracture    CKD (chronic kidney disease), stage III    Iron deficiency anemia    H/O epistaxis    Vertigo    GI bleed    S/P TVR (tricuspid valve repair)    S/P ventricular assist device    S/P endoscopy    OBJECTIVE:  ICU Vital Signs Last 24 Hrs  T(C): 38.2 (2021 10:00), Max: 38.5 (2021 12:00)  T(F): 100.8 (2021 10:00), Max: 101.3 (2021 12:00)  HR: 65 (2021 10:00) (61 - 69)  BP: --  BP(mean): --  ABP: 105/67 (2021 10:00) (90/54 - 113/64)  ABP(mean): 77 (2021 10:00) (63 - 77)  RR: 20 (2021 10:00) (19 - 35)  SpO2: 99% (2021 10:00) (96% - 100%)       @ 07:  -   @ 07:00  --------------------------------------------------------  IN: 2693.9 mL / OUT: 1415 mL / NET: 1278.9 mL     @ 07:01  -   @ 10:49  --------------------------------------------------------  IN: 420.8 mL / OUT: 115 mL / NET: 305.8 mL  PHYSICAL EXAM:Daily   Elderly male S/P tracheostomy   on full ventilatory support /40%20/5cm PEEP  0n vasopressin , alternating with trach. collar on IV heparin   Daily Weight in k.4 (2021 00:00)  HEENT:     + NCAT  + EOMI  - Conjuctival edema   - Icterus   - Thrush   - ETT  + NGT/OGT  Neck:         + FROM   RT SC line  JVD     - Nodes     - Masses    + Mid-line trachea + Tracheostomy  Chest: normal findings  Lungs:          + CTA   + Rhonchi    - Rales    - Wheezing + Decreased  LT BS   - Dullness R L  Cardiac:       + S1 + S2    + RRR   - Irregular   - S3  - S4    - Murmurs   - Rub   - Hamman’s sign   Abdomen:    + BS     + Soft    + Non-tender     - Distended    - Organomegaly  - PEG  Extremities:   - Cyanosis U/L   - Clubbing  U/L  + LE/UE Edema   + Capillary refill    + Pulses   Neuro:        - Awake   -  Alert   - Confused   - Lethargic   + Sedated  + Generalized Weakness  Skin:        - Rashes    - Erythema   + Normal incisions   + IV sites intact          HOSPITAL MEDICATIONS: All mediciations reviewed and analyzed  MEDICATIONS  (STANDING):  aMIOdarone    Tablet 200 milliGRAM(s) Oral daily  chlorhexidine 0.12% Liquid 15 milliLiter(s) Oral Mucosa every 12 hours  chlorhexidine 2% Cloths 1 Application(s) Topical <User Schedule>  dexMEDEtomidine Infusion 0.5 MICROgram(s)/kG/Hr (9.81 mL/Hr) IV Continuous <Continuous>  dextrose 50% Injectable 50 milliLiter(s) IV Push every 15 minutes  heparin  Infusion 400 Unit(s)/Hr (12.5 mL/Hr) IV Continuous <Continuous>  HYDROmorphone  Injectable 0.5 milliGRAM(s) IV Push once  insulin lispro (ADMELOG) corrective regimen sliding scale   SubCutaneous every 6 hours  pantoprazole  Injectable 40 milliGRAM(s) IV Push every 12 hours  piperacillin/tazobactam IVPB.. 3.375 Gram(s) IV Intermittent every 8 hours  propofol Infusion 20 MICROgram(s)/kG/Min (9.42 mL/Hr) IV Continuous <Continuous>  sodium chloride 0.9% lock flush 3 milliLiter(s) IV Push every 8 hours  sodium chloride 0.9%. 1000 milliLiter(s) (10 mL/Hr) IV Continuous <Continuous>    MEDICATIONS  (PRN):  acetaminophen    Suspension .. 650 milliGRAM(s) Oral every 6 hours PRN Temp greater or equal to 38C (100.4F)    LABS: All Lab data reviewed and analyzed                                     10.5   6.51  )-----------( 246      ( 2021 01:04 )             34.3    07-18    134<L>  |  96  |  14  ----------------------------<  134<H>  4.3   |  26  |  0.74    Ca    10.7<H>      2021 01:04  Phos  3.5     07-18  Mg     2.0     07-18    TPro  9.5<H>  /  Alb  4.4  /  TBili  0.3  /  DBili  x   /  AST  26  /  ALT  23  /  AlkPhos  108  07-18    Ca    10.2      2021 01:25  Phos  3.4     07-17  Mg     1.9     07-17    TPro  9.0<H>  /  Alb  4.5  /  TBili  0.3  /  DBili  x   /  AST  28  /  ALT  21  /  AlkPhos  95  07-17    Ca    10.2      2021 00:40  Phos  3.0     07-16  Mg     2.0     07-16    TPro  8.8<H>  /  Alb  4.5  /  TBili  0.3  /  DBili  x   /  AST  25  /  ALT  16  /  AlkPhos  94  07-16      Ca    10.1      15 Jul 2021 00:37  Phos  2.8     07-15  Mg     2.4     07-15    TPro  8.6<H>  /  Alb  4.4  /  TBili  0.3  /  DBili  x   /  AST  22  /  ALT  10  /  AlkPhos  85  07-15                                               PTT - ( 2021 04:52 )  PTT:45.2 sec LIVER FUNCTIONS - ( 2021 00:42 )  Alb: 3.4 g/dL / Pro: 6.7 g/dL / ALK PHOS: 213 U/L / ALT: 15 U/L / AST: 24 U/L / GGT: x           RADIOLOGY: - Reviewed and analyzed LLL  pneumonia , LVAD HM2, CT scan of abdomen reviewed result noted

## 2021-07-18 NOTE — PROGRESS NOTE ADULT - ASSESSMENT
Assessment and Recommendation:   · Assessment	  Assessment and recommendation :  Recurrent Acute hypoxic respiratory Failure S/P tracheostomy on full ventilatory support alternating with trach. collar at 50% Fi02  S/P Acute left lower lobe pneumonia   Aspiration pneumonia   Stool is  +ve for C-diff. colitis on PO vancomycin  improved   nutrition not appreciated S/P emesis last night   S/P  Ancef improved for bacteremia   Non ischemic cardiomyopathy continue ACE inhibitor and B-Blockers   S/P Septic shock and cardiogenic shock   Stage D systolic heart failure S/P LVAD HM2   MH2 LVAD  with  TV Annuloplasty  Severe peripheral vascular disease   S/P Anion gap metabolic acidosis  severe hyperglycemia on insulin coverage    On  Amiodarone and IV heparin   critical care polyneuropathy   Anemia of Acute blood Loss   S/P Small bowel bleeding   S/P blood and FFP transfusion   Chronic kidney disease stage III  Continue NGT feeding   PT and OT at bed side   GI prophylaxis  discuss with TCV surgeon

## 2021-07-18 NOTE — PROGRESS NOTE ADULT - SUBJECTIVE AND OBJECTIVE BOX
Subjective:      Medications:  acetaminophen    Suspension .. 650 milliGRAM(s) Oral every 6 hours PRN  aMIOdarone    Tablet 200 milliGRAM(s) Oral daily  clonazePAM  Tablet 0.5 milliGRAM(s) Oral at bedtime  heparin  Infusion 1000 Unit(s)/Hr IV Continuous <Continuous>  hydrALAZINE 25 milliGRAM(s) Oral every 8 hours PRN  insulin lispro (ADMELOG) corrective regimen sliding scale   SubCutaneous every 6 hours  metoprolol tartrate 25 milliGRAM(s) Oral every 12 hours  pantoprazole   Suspension 40 milliGRAM(s) Enteral Tube daily  psyllium Powder 1 Packet(s) Oral daily  vancomycin    Solution 125 milliGRAM(s) Oral every 12 hours      ICU Vital Signs Last 24 Hrs  T(C): 37.1, Max: 37.1 ( @ 08:00)  HR: 85 (85 - 105)  BP: --  BP(mean): 82 (82 - 84)  ABP: 102/71 (81/68 - 121/77)  ABP(mean): 83 (70 - 94)  RR: 20 (0 - 40)  SpO2: 100% (92% - 100%)    Weight in k.8 (21)  Weight in k.3 (21)      I&O's Summary Last 24 Hrs    IN: 1974 mL / OUT: 1550 mL / NET: 424 mL      Tele:    Physical Exam:    General: No distress. Comfortable.  HEENT: PERRL.  Neck: JVP not elevated.  Respiratory: Clear to auscultation bilaterally  CV: RRR. Normal S1 and S2. No murmurs, rub, or gallops. Radial pulses normal.  Abdomen: Soft, non-distended, non-tender  Skin: No skin lesions  Extremities: Warm, no edema  Neurology: Non-focal, alert and oriented times three.   Psych: Affect normal    Labs:    ( 21 @ 01:04 )               10.5   6.51  )--------( 246                  34.3     ( 21 @ 01:04 )     134  |  96  |  14  ---------------------<  134  4.3  |  26  |  0.74    Ca 10.7  Phos 3.5  Mg 2.0    ( 21 @ 01:04 )  TPro  9.5  /  Alb  4.4  /  TBili  0.3  /  DBili  x   /  AST  26  /  ALT  23  /  AlkPhos  108  ( 21 @ 01:25 )  TPro  9.0  /  Alb  4.5  /  TBili  0.3  /  DBili  x   /  AST  28  /  ALT  21  /  AlkPhos  95    PTT/PT/INR - ( 21 @ 08:53 )  PTT: 68.0 sec / PT: x     / INR: x                ABG - ( 21 @ 15:05 )  pH: 7.39  / pCO2: 48    / pO2: 164   / HCO3: 28    / Base Excess: 3.4   / SaO2: 100        Lactate Dehydrogenase, Serum: 279 (21 @ 01:04)  Lactate Dehydrogenase, Serum: 250 (21 @ 00:40)   Subjective:  - NAEO, sitting up in chair no complaints  - 1 loose BM today    Medications:  acetaminophen    Suspension .. 650 milliGRAM(s) Oral every 6 hours PRN  aMIOdarone    Tablet 200 milliGRAM(s) Oral daily  clonazePAM  Tablet 0.5 milliGRAM(s) Oral at bedtime  heparin  Infusion 1000 Unit(s)/Hr IV Continuous <Continuous>  hydrALAZINE 25 milliGRAM(s) Oral every 8 hours PRN  insulin lispro (ADMELOG) corrective regimen sliding scale   SubCutaneous every 6 hours  metoprolol tartrate 25 milliGRAM(s) Oral every 12 hours  pantoprazole   Suspension 40 milliGRAM(s) Enteral Tube daily  psyllium Powder 1 Packet(s) Oral daily  vancomycin    Solution 125 milliGRAM(s) Oral every 12 hours      ICU Vital Signs Last 24 Hrs  T(C): 37.1, Max: 37.1 ( @ 08:00)  HR: 85 (85 - 105)  BP: --  BP(mean): 82 (82 - 84)  ABP: 102/71 (81/68 - 121/77)  ABP(mean): 83 (70 - 94)  RR: 20 (0 - 40)  SpO2: 100% (92% - 100%)    Weight in k.8 (21)  Weight in k.3 (21)      I&O's Summary Last 24 Hrs    IN: 1974 mL / OUT: 1550 mL / NET: 424 mL      Tele: SR/ST 80-110s, briefly up to 130-140s    Physical Exam:    General: No distress. Comfortable.  Neck: JVP not elevated. Trach midline  Respiratory: Clear to auscultation bilaterally  CV: RRR. + LVAD hum.  Abdomen: Soft, non-distended, non-tender  Skin: No skin lesions  Extremities: Warm, no edema  Neurology: Non-focal, alert and oriented times three.   Psych: Affect normal    LVAD interrogation:  Device: HM2  Speed: 9200  Flow: 4.8  Power: 5.4  PI: 6.3  No PI events    Labs:    ( 21 @ 01:04 )               10.5   6.51  )--------( 246                  34.3     ( 21 @ 01:04 )     134  |  96  |  14  ---------------------<  134  4.3  |  26  |  0.74    Ca 10.7  Phos 3.5  Mg 2.0    ( 21 @ 01:04 )  TPro  9.5  /  Alb  4.4  /  TBili  0.3  /  DBili  x   /  AST  26  /  ALT  23  /  AlkPhos  108  ( 21 @ 01:25 )  TPro  9.0  /  Alb  4.5  /  TBili  0.3  /  DBili  x   /  AST  28  /  ALT  21  /  AlkPhos  95    PTT/PT/INR - ( 21 @ 08:53 )  PTT: 68.0 sec / PT: x     / INR: x        ABG - ( 21 @ 15:05 )  pH: 7.39  / pCO2: 48    / pO2: 164   / HCO3: 28    / Base Excess: 3.4   / SaO2: 100        Lactate Dehydrogenase, Serum: 279 (21 @ 01:04)  Lactate Dehydrogenase, Serum: 250 (21 @ 00:40)

## 2021-07-19 NOTE — PROGRESS NOTE ADULT - ATTENDING COMMENTS
No acute cardiovascular issues.  Hemodynamically stable. No significant VAD alarms.   Hopefully can move forward with trach care and downsizing. CTS following.   Continue supportive care.

## 2021-07-19 NOTE — PROGRESS NOTE ADULT - ASSESSMENT
66M with a history of stage D NICM s/p HM2 on 2017 as DT (due to severe PAD) with TV ring, prior COVID-19 infection 2020, recurrent syncope post LVAD s/p ILR, and chronic abdominal pain with prior negative workup  Admitted 21with symptomatic anemia with Hgb 4.5 in setting of INR 8.8 without hemodynamic instability. He was transfused and underwent VCE which showed active bleeding in the mid small bowel but subsequent enteroscopy 6/15 did not reveal any active bleeding.   He acutely decompensated after procedure with fever/hypertension, low flow alarms, and pulmonary infiltrate with hypoxia requiring intubation from probable aspiration PNA.   Course notable for inability to wean ventilator with persistent secretions for which he underwent tracheostomy    acute c diff colitis.     Antibiotics  IV Vanco 6/15,  -->  Zosyn 6/15 -->   PO Vanco 500 q 6  -->   PO Vanco 125 q 6h   IV Flagyl -->   Cefepime -->29  Cefazolin --7    Positive Cdiff testin21    Clostridiodes difficile :  Abdomen somewhat distended- but soft- loose stool again noted but x1 by late this afternoon today  WBC within the normal range  Continue Vanco oral but reduced to tapering dose of 125mg bid  appears improved    Continue po Vanco taper:  BID  -->   Once daily:  -->   Once every other day:  -->   Once every third day: 8/3--<

## 2021-07-19 NOTE — PROGRESS NOTE ADULT - SUBJECTIVE AND OBJECTIVE BOX
Follow Up:  Cdiff    Interval History/ROS:  ob to chair, notes decreased diarrhea    Allergies  No Known Allergies    ANTIMICROBIALS:  vancomycin    Solution 125 every 12 hours      OTHER MEDS:  MEDICATIONS  (STANDING):  acetaminophen    Suspension .. 650 every 6 hours PRN  aMIOdarone    Tablet 200 daily  clonazePAM  Tablet 0.5 at bedtime  hydrALAZINE 25 every 8 hours PRN  insulin lispro (ADMELOG) corrective regimen sliding scale  every 6 hours  metoprolol tartrate 25 every 12 hours  pantoprazole   Suspension 40 daily  psyllium Powder 1 daily  warfarin 5 once      Vital Signs Last 24 Hrs  T(C): 36.7 (19 Jul 2021 16:00), Max: 36.8 (19 Jul 2021 08:00)  T(F): 98.1 (19 Jul 2021 16:00), Max: 98.3 (19 Jul 2021 08:00)  HR: 99 (19 Jul 2021 18:00) (79 - 106)  BP: 101/75 (19 Jul 2021 18:00) (100/72 - 120/75)  BP(mean): 0 (19 Jul 2021 18:00) (0 - 94)  RR: 18 (19 Jul 2021 18:00) (16 - 60)  SpO2: 100% (19 Jul 2021 18:00) (95% - 100%)    PHYSICAL EXAM:  General: thin NAD, Non-toxic  Neurology: Alert  Respiratory: trach collar, Clear to auscultation bilaterally  CV: RRR, S1S2, no murmurs, rubs or gallops  Abdominal: Soft, Non-tender, non-distended  Extremities: No edema,  Line Sites: Clear  Skin: No rash                        10.9   7.93  )-----------( 206      ( 19 Jul 2021 00:28 )             36.4   WBC Count: 7.93 (07-19 @ 00:28)  WBC Count: 6.51 (07-18 @ 01:04)  WBC Count: 6.79 (07-17 @ 01:25)  WBC Count: 8.03 (07-16 @ 00:40)  WBC Count: 9.83 (07-15 @ 00:37)    07-19    133<L>  |  93<L>  |  19  ----------------------------<  127<H>  4.6   |  27  |  0.88    Ca    10.6<H>      19 Jul 2021 00:28  Phos  4.0     07-19  Mg     2.2     07-19    TPro  9.8<H>  /  Alb  4.9  /  TBili  0.3  /  DBili  x   /  AST  28  /  ALT  22  /  AlkPhos  114  07-19      MICROBIOLOGY:  .Blood Blood  07-14-21   No growth to date.  --  --      .Blood Blood-Venous  07-09-21   No Growth Final  --  --      .Sputum Sputum  07-07-21   Moderate Citrobacter freundii  Moderate Serratia marcescens  Normal Respiratory Bria present  --  Citrobacter freundii  Serratia marcescens      .Sputum Sputum  06-30-21   Normal Respiratory Bria present  --    Few Squamous epithelial cells per low power field  Moderate polymorphonuclear leukocytes per low power field  No organisms seen      .Blood Blood-Peripheral  06-29-21   No Growth Final  --  --      .Blood Blood-Peripheral  06-26-21   No Growth Final  --  Staphylococcus lugdunensis      .Blood Blood  06-23-21   No Growth Final  --  --      .Sputum Sputum trap  06-23-21   Numerous Citrobacter freundii  Moderate Klebsiella pneumoniae  Normal Respiratory Bria present  --  Citrobacter freundii  Klebsiella pneumoniae      .Sputum Sputum  06-20-21   Normal Respiratory Bria present  --    Few polymorphonuclear leukocytes per low power field  No Squamous epithelial cells per low power field  No organisms seen per oil power field      .Blood Blood-Peripheral  06-20-21   No Growth Final  --  Blood Culture PCR    RADIOLOGY:  < from: Xray Chest 1 View- PORTABLE-Urgent (Xray Chest 1 View- PORTABLE-Urgent .) (07.19.21 @ 17:06) >  The heart is enlarged. Thelungs appear to be clear. No pleural effusion. No pneumothorax. All life supporting devices in good position and unchanged when compared to previous study done the same date at 3:25 AM. Status post sternotomy.    < end of copied text >      Vasyl Montana MD; Division of Infectious Disease; Pager: [phone]; nights and weekends: [phone]

## 2021-07-19 NOTE — PROGRESS NOTE ADULT - SUBJECTIVE AND OBJECTIVE BOX
RICKY JOINT  MRN#: [id number]  Subjective:  Pulmonary progress  : recurrent Acute hypoxic respiratory Failure ,aspiration pneumonia, NICM  , chart reviewed and H/O obtained radiological and Laboratory study reviewed patient Examined     65M PMH ACC/AHA stage D HF due to NICM HM2 LVAD , TV annuloplasty ring 17 as destination therapy due to severe peripheral artery disease with significant stenosis  SIADH, Depression, CKD-3 with hyperkalemia, past E. coli UTIs, driveline drainage (21) and COVID-19 (back in 2020)  He was recently seen in clinic where he complained of abdominal pain and dark stools w constipation back in May. He presents to [facility] ER today weakness and fatigue, moderate and + Black stools for three days, on coumadin secondary to warfarin use in the setting of an LVAD. Patient has required transfusions for GIB in the past. Mostly recently back in 2021 pt had anemia with dark stools. No interventions was done at that time. However Last Endoscopy was done in 2020 (negative). Today labs show patient is anemic with H/H of 4.5/16.3,. INR is 8.84 MAP in the 90s, Temp 35.1. He denies any chest pain, shortness of breath, dizziness, abd pain, nausea or vomiting. found to have  rectal bleeding underwent endoscopy ,old blood in the proximal ileum ,  develop sepsis with LL opacity given Antibiotics , Extubated , reintubated , Bronchoscopy on Zosyn for LL pneumonia  and Amiodarone S/P TV Annuloplasty , patient remain intubated on full ventilatory support .S/P multiple units of blood transfusion , remain on full ventilatory support on Precedex and propofol , new central IJ line , diarrhea C diff. +ve on po vancomycin and IV Flagyl,  mildly distended belly , fever start on cefepime 2gm q 8 hrs S/P tracheostomy .  new RT Subclavian central line continue on contact  isolation ,still diarrhea on C-diff antibiotics ENT follow up appreciated , trial of C-PAP as tolerated , , copious secretion from trach. site chest x ray left lower lobe pneumonia , tolerating trch. collar 50% FI02 still excessive secretion need pulmonary toilet , off Ancef antibiotic , no more diarrhea back on full support mechanical ventilator , chest x ray show improvement in LLL air space disease, more awake and responsive on tube feeding no more diarrhea , S/P  Ancef for bacteremia no fever ,ID follow up noted ,  no nausea or vomiting or diarrhea still very weak and tired , event note pulled NG tube now replaced , back on tube feeding ,still on po vancomycin , getting PT and OT at bed side , no plan for decannulation for now. , no more diarrhea receiving PT and OPT at bed side , minimal secretion from tracheostomy site ,          (2021 16:57)    PAST MEDICAL & SURGICAL HISTORY:  CHF (congestive heart failure)    CAD (coronary artery disease)  Depression    Pleural effusion    History of 2019 novel coronavirus disease (COVID-19)  2020    Hemorrhoids    Bleeding hemorrhoids    Peripheral arterial disease    Claudication    BPH with urinary obstruction    ACC/AHA stage D systolic heart failure  Anticoagulation goal of INR 2.0 to 2.5    Falls    Clavicle fracture    CKD (chronic kidney disease), stage III    Iron deficiency anemia    H/O epistaxis    Vertigo    GI bleed    S/P TVR (tricuspid valve repair)    S/P ventricular assist device    S/P endoscopy    OBJECTIVE:  ICU Vital Signs Last 24 Hrs  T(C): 38.2 (2021 10:00), Max: 38.5 (2021 12:00)  T(F): 100.8 (2021 10:00), Max: 101.3 (2021 12:00)  HR: 65 (2021 10:00) (61 - 69)  BP: --  BP(mean): --  ABP: 105/67 (2021 10:00) (90/54 - 113/64)  ABP(mean): 77 (2021 10:00) (63 - 77)  RR: 20 (2021 10:00) (19 - 35)  SpO2: 99% (2021 10:00) (96% - 100%)       @ 07:  -   @ 07:00  --------------------------------------------------------  IN: 2693.9 mL / OUT: 1415 mL / NET: 1278.9 mL     @ 07:01  -   @ 10:49  --------------------------------------------------------  IN: 420.8 mL / OUT: 115 mL / NET: 305.8 mL  PHYSICAL EXAM:Daily   Elderly male S/P tracheostomy   on full ventilatory support /40%20/5cm PEEP  , alternating with trach. collar on IV heparin   Daily Weight in k.4 (2021 00:00)  HEENT:     + NCAT  + EOMI  - Conjuctival edema   - Icterus   - Thrush   - ETT  + NGT/OGT  Neck:         + FROM   RT SC line  JVD     - Nodes     - Masses    + Mid-line trachea + Tracheostomy  Chest: normal findings  Lungs:          + CTA   + Rhonchi    - Rales    - Wheezing + Decreased  LT BS   - Dullness R L  Cardiac:       + S1 + S2    + RRR   - Irregular   - S3  - S4    - Murmurs   - Rub   - Hamman’s sign   Abdomen:    + BS     + Soft    + Non-tender     - Distended    - Organomegaly  - PEG  Extremities:   - Cyanosis U/L   - Clubbing  U/L  + LE/UE Edema   + Capillary refill    + Pulses   Neuro:        - Awake   -  Alert   - Confused   - Lethargic   + Sedated  + Generalized Weakness  Skin:        - Rashes    - Erythema   + Normal incisions   + IV sites intact          HOSPITAL MEDICATIONS: All mediciations reviewed and analyzed  MEDICATIONS  (STANDING):  aMIOdarone    Tablet 200 milliGRAM(s) Oral daily  chlorhexidine 0.12% Liquid 15 milliLiter(s) Oral Mucosa every 12 hours  chlorhexidine 2% Cloths 1 Application(s) Topical <User Schedule>  dexMEDEtomidine Infusion 0.5 MICROgram(s)/kG/Hr (9.81 mL/Hr) IV Continuous <Continuous>  dextrose 50% Injectable 50 milliLiter(s) IV Push every 15 minutes  heparin  Infusion 400 Unit(s)/Hr (12.5 mL/Hr) IV Continuous <Continuous>  HYDROmorphone  Injectable 0.5 milliGRAM(s) IV Push once  insulin lispro (ADMELOG) corrective regimen sliding scale   SubCutaneous every 6 hours  pantoprazole  Injectable 40 milliGRAM(s) IV Push every 12 hours  piperacillin/tazobactam IVPB.. 3.375 Gram(s) IV Intermittent every 8 hours  propofol Infusion 20 MICROgram(s)/kG/Min (9.42 mL/Hr) IV Continuous <Continuous>  sodium chloride 0.9% lock flush 3 milliLiter(s) IV Push every 8 hours  sodium chloride 0.9%. 1000 milliLiter(s) (10 mL/Hr) IV Continuous <Continuous>    MEDICATIONS  (PRN):  acetaminophen    Suspension .. 650 milliGRAM(s) Oral every 6 hours PRN Temp greater or equal to 38C (100.4F)    LABS: All Lab data reviewed and analyzed                                   10.9   7  )-----------( 206      ( 2021 00:28 )             36.4   07-19    133<L>  |  93<L>  |  19  ----------------------------<  127<H>  4.6   |  27  |  0.88    Ca    10.6<H>      2021 00:28  Phos  4.0     07-19  Mg     2.2     07-19    TPro  9.8<H>  /  Alb  4.9  /  TBili  0.3  /  DBili  x   /  AST  28  /  ALT  22  /  AlkPhos  114  07-19    Ca    10.7<H>      2021 01:04  Phos  3.5     07-18  Mg     2.0     07-18    TPro  9.5<H>  /  Alb  4.4  /  TBili  0.3  /  DBili  x   /  AST  26  /  ALT  23  /  AlkPhos  108  07-18    Ca    10.2      2021 01:25  Phos  3.4     07-17  Mg     1.9     07-17    TPro  9.0<H>  /  Alb  4.5  /  TBili  0.3  /  DBili  x   /  AST  28  /  ALT  21  /  AlkPhos  95  07-17                                                     PTT - ( 2021 04:52 )  PTT:45.2 sec LIVER FUNCTIONS - ( 2021 00:42 )  Alb: 3.4 g/dL / Pro: 6.7 g/dL / ALK PHOS: 213 U/L / ALT: 15 U/L / AST: 24 U/L / GGT: x           RADIOLOGY: - Reviewed and analyzed LLL  pneumonia , LVAD HM2, CT scan of abdomen reviewed result noted

## 2021-07-19 NOTE — PROGRESS NOTE ADULT - SUBJECTIVE AND OBJECTIVE BOX
Patient seen and examined at bedside.    Overnight Events: No acute events overnight, doing well on trach.     Review Of Systems: No chest pain, shortness of breath, or palpitations            Current Meds:  acetaminophen    Suspension .. 650 milliGRAM(s) Oral every 6 hours PRN  aMIOdarone    Tablet 200 milliGRAM(s) Oral daily  clonazePAM  Tablet 0.5 milliGRAM(s) Oral at bedtime  hydrALAZINE 25 milliGRAM(s) Oral every 8 hours PRN  insulin lispro (ADMELOG) corrective regimen sliding scale   SubCutaneous every 6 hours  metoprolol tartrate 25 milliGRAM(s) Oral every 12 hours  pantoprazole   Suspension 40 milliGRAM(s) Enteral Tube daily  psyllium Powder 1 Packet(s) Oral daily  vancomycin    Solution 125 milliGRAM(s) Oral every 12 hours      Vitals:  T(F): 98.3 (07-19), Max: 98.6 (07-18)  HR: 85 (07-19) (79 - 120)  BP: --  RR: 16 (07-19)  SpO2: 100% (07-19)  I&O's Summary    18 Jul 2021 07:01  -  19 Jul 2021 07:00  --------------------------------------------------------  IN: 1704 mL / OUT: 450 mL / NET: 1254 mL    19 Jul 2021 07:01  -  19 Jul 2021 09:32  --------------------------------------------------------  IN: 60 mL / OUT: 0 mL / NET: 60 mL    Physical Exam:  Appearance: No acute distress; well appearing  Cardiovascular: RRR, S1, S2, no murmurs, rubs, or gallops; no edema; no JVD  Respiratory: Clear to auscultation bilaterally  Gastrointestinal: soft, non-tender, non-distended with normal bowel sounds  Musculoskeletal: No clubbing; no joint deformity   Neurologic: Non-focal  Psychiatry: AAOx3, mood & affect appropriate  Skin: No rashes, ecchymoses, or cyanosis                          10.9   7.93  )-----------( 206      ( 19 Jul 2021 00:28 )             36.4     07-19    133<L>  |  93<L>  |  19  ----------------------------<  127<H>  4.6   |  27  |  0.88    Ca    10.6<H>      19 Jul 2021 00:28  Phos  4.0     07-19  Mg     2.2     07-19    TPro  9.8<H>  /  Alb  4.9  /  TBili  0.3  /  DBili  x   /  AST  28  /  ALT  22  /  AlkPhos  114  07-19    PT/INR - ( 18 Jul 2021 15:21 )   PT: 22.8 sec;   INR: 1.96 ratio         PTT - ( 18 Jul 2021 15:21 )  PTT:58.4 sec   Patient seen and examined at bedside.    Overnight Events: No acute events overnight, doing well on trach.     Review Of Systems: No chest pain, shortness of breath, or palpitations            Current Meds:  acetaminophen    Suspension .. 650 milliGRAM(s) Oral every 6 hours PRN  aMIOdarone    Tablet 200 milliGRAM(s) Oral daily  clonazePAM  Tablet 0.5 milliGRAM(s) Oral at bedtime  hydrALAZINE 25 milliGRAM(s) Oral every 8 hours PRN  insulin lispro (ADMELOG) corrective regimen sliding scale   SubCutaneous every 6 hours  metoprolol tartrate 25 milliGRAM(s) Oral every 12 hours  pantoprazole   Suspension 40 milliGRAM(s) Enteral Tube daily  psyllium Powder 1 Packet(s) Oral daily  vancomycin    Solution 125 milliGRAM(s) Oral every 12 hours      Vitals:  T(F): 98.3 (07-19), Max: 98.6 (07-18)  HR: 85 (07-19) (79 - 120)  BP: --  RR: 16 (07-19)  SpO2: 100% (07-19)  I&O's Summary    18 Jul 2021 07:01  -  19 Jul 2021 07:00  --------------------------------------------------------  IN: 1704 mL / OUT: 450 mL / NET: 1254 mL    19 Jul 2021 07:01  -  19 Jul 2021 09:32  --------------------------------------------------------  IN: 60 mL / OUT: 0 mL / NET: 60 mL    Physical Exam:  Appearance: No acute distress; well appearing  Cardiovascular: RRR, S1, S2, no murmurs, rubs, or gallops; no edema; no JVD  Respiratory: Clear to auscultation bilaterally  Gastrointestinal: soft, non-tender, non-distended with normal bowel sounds  Musculoskeletal: No clubbing; no joint deformity   Neurologic: Non-focal  Psychiatry: AAOx3, mood & affect appropriate  Skin: No rashes, ecchymoses, or cyanosis                          10.9   7.93  )-----------( 206      ( 19 Jul 2021 00:28 )             36.4     07-19    133<L>  |  93<L>  |  19  ----------------------------<  127<H>  4.6   |  27  |  0.88    Ca    10.6<H>      19 Jul 2021 00:28  Phos  4.0     07-19  Mg     2.2     07-19    TPro  9.8<H>  /  Alb  4.9  /  TBili  0.3  /  DBili  x   /  AST  28  /  ALT  22  /  AlkPhos  114  07-19    PT/INR - ( 18 Jul 2021 15:21 )   PT: 22.8 sec;   INR: 1.96 ratio      PTT - ( 18 Jul 2021 15:21 )  PTT:58.4 sec

## 2021-07-19 NOTE — CHART NOTE - NSCHARTNOTEFT_GEN_A_CORE
Seen for: nutrition follow up   Source: EMR, RN, HF rounds    Chart reviewed, events noted. Per chart: 65 year old male with PMHx of NICM, s/p  HM2 LVAD in , on coumadin, CKD 3 presents with anemia, dark stools and supra therapeutic INR. S/p capsule study, endoscopy. Course c/b possible aspiration event. C-Diff +, being treated with vancomycin. S/p tracheostomy on . Intermittently on trach collar.    Diet, NPO with Tube Feed: Jevity 1.2 @ 60 ml/hr x 24 hours + Probiotic smoothie 2x/day     EN Order Provides: 1728 kcal, 80 g protein   (meeting 22 kcal/kg, 1.02 g/kg protein using dosing weight 78.5 kg)    EN provision per chart:   () infusing at goal rate  () 100% of goal  () 80% of goal, feeds resumed after NGT pulled  () 67% of goal  (7/15) 100% of goal  (7/15) at goal rate thus far  () 89% of goal   () 20% of goal; pt pulled NGT & refused   () 92% of goal  () 100% of goal  (7/10) 100% of goal   () 50% of goal    GI:  Intermittent diarrhea continues  - C. Diff + (); remains on vancomycin   - Unclear etiology of persistent diarrhea  - Formula changed from Vital AF to Jevity 1.2 on  to provide more fiber to aid in stool bulking   - Diarrhea seemingly improving, less frequent at this time, range from liquid to soft, stool count below  - Noted emesis on , 200 ml    Stool count:   () 2x  () 1x  () 4x  (7/15) 4x  () 2x  () 3x  () none noted  () none noted  (7/10) 1x  () 8x  () none noted  () none noted  () 2x    Weight history:   Admission weight: 176.3 pounds / 80 kg (6/15)    Weights:   Daily Weight in k.6 (-), Weight in k.8 (), Weight in k.3 (-17), Weight in k.3 (-16), Weight in k.5 (-14), Weight in k.4 ()    MEDICATIONS  (STANDING):  aMIOdarone    Tablet  insulin lispro (ADMELOG) corrective regimen sliding scale  metoprolol tartrate  pantoprazole   Suspension  psyllium Powder  vancomycin    Solution    Pertinent Labs:  @ 00:28: Na 133<L>, BUN 19, Cr 0.88, <H>, K+ 4.6, Phos 4.0, Mg 2.2, Alk Phos 114, ALT/SGPT 22, AST/SGOT 28, HbA1c --    A1C with Estimated Average Glucose Result: 5.6 % (06-15-21 @ 02:42)    Finger Sticks:  POCT Blood Glucose.: 114 mg/dL ( @ 05:22)  POCT Blood Glucose.: 124 mg/dL ( @ 00:16)  POCT Blood Glucose.: 151 mg/dL ( @ 17:06)  POCT Blood Glucose.: 110 mg/dL ( @ 12:06)    Triglycerides, Serum: 145 mg/dL (21 @ 00:53)  Triglycerides, Serum: 191 mg/dL (21 @ 00:36)    Skin per chart: no pressure injuries  Edema per chart: none noted    Estimated Nutrition Needs:   - Using dosing weight 78.5 kg, considering vent support  - Energy Needs (25-30 kcal/kg): 7481-1905 kcal/day  - Protein Needs (1.2-1.4 g/kg):     Previous Nutrition Diagnosis: Moderate malnutrition  Nutrition diagnosis is ongoing, being addressed with tube feeds as tolerated    New Nutrition Diagnosis: n/a       Recommendations:      1) Increase tube feeds to Jevity 1.2 @ 70 ml/hr x 24 hours to meet 2016 kcal, 93 g protein (meeting 25.6 kcal/kg, 1.2 g/kg protein using dosing weight 78.5 kg).   2) Continue Probiotic smoothie  3) Continue Metamucil to aid in stool bulking as clinically indicated, defer to team. Ensure adequate administration/flushing to avoid NGT clogging.    Continue monitoring and evaluating:   - Labs: blood glucose, electrolytes, renal indices  - GI function and bowel movements  - Nutritional intake, weight trends, skin integrity    RD remains available PRN and will follow up per protocol.   Kandice Peña RD, CDN, [facility]   Pager # [phone]

## 2021-07-19 NOTE — PROGRESS NOTE ADULT - ASSESSMENT
Assessment and Recommendation:   · Assessment	  Assessment and recommendation :  Recurrent Acute hypoxic respiratory Failure S/P tracheostomy on full ventilatory support alternating with trach. collar at 50% Fi02  S/P Acute left lower lobe pneumonia   Aspiration pneumonia   Stool is  +ve for C-diff. colitis on PO vancomycin  improved   S/P  Ancef improved for bacteremia   Non ischemic cardiomyopathy continue ACE inhibitor and B-Blockers   S/P Septic shock and cardiogenic shock   Stage D systolic heart failure S/P LVAD HM2   MH2 LVAD  with  TV Annuloplasty  Severe peripheral vascular disease   S/P Anion gap metabolic acidosis  severe hyperglycemia on insulin coverage    On  Amiodarone and IV heparin   critical care polyneuropathy   Anemia of Acute blood Loss   S/P Small bowel bleeding   S/P blood and FFP transfusion   Chronic kidney disease stage III  Continue NGT feeding   PT and OT at bed side   GI prophylaxis  discuss with TCV surgeon

## 2021-07-19 NOTE — PROGRESS NOTE ADULT - SUBJECTIVE AND OBJECTIVE BOX
CRITICAL CARE ATTENDING - CTICU    MEDICATIONS  (STANDING):  aMIOdarone    Tablet 200 milliGRAM(s) Oral daily  clonazePAM  Tablet 0.5 milliGRAM(s) Oral at bedtime  insulin lispro (ADMELOG) corrective regimen sliding scale   SubCutaneous every 6 hours  metoprolol tartrate 25 milliGRAM(s) Oral every 12 hours  pantoprazole   Suspension 40 milliGRAM(s) Enteral Tube daily  psyllium Powder 1 Packet(s) Oral daily  vancomycin    Solution 125 milliGRAM(s) Oral every 12 hours                                    10.9   7.93  )-----------( 206      ( 2021 00:28 )             36.4       07-19    133<L>  |  93<L>  |  19  ----------------------------<  127<H>  4.6   |  27  |  0.88    Ca    10.6<H>      2021 00:28  Phos  4.0       Mg     2.2         TPro  9.8<H>  /  Alb  4.9  /  TBili  0.3  /  DBili  x   /  AST  28  /  ALT  22  /  AlkPhos  114  19      PT/INR - ( 2021 15:21 )   PT: 22.8 sec;   INR: 1.96 ratio         PTT - ( 2021 15:21 )  PTT:58.4 sec        Daily     Daily Weight in k.6 (2021 00:00)      -17 @ : @ 07:00  --------------------------------------------------------  IN: 1974 mL / OUT: 1550 mL / NET: 424 mL     @ 07: @ 06:57  --------------------------------------------------------  IN: 1704 mL / OUT: 450 mL / NET: 1254 mL          Critically Ill patient  : [ ] preoperative ,   [ ] post operative    [x] non operative    Requires :  [ ] Arterial Line   [ ] Central Line  [ ] PA catheter  [ ] IABP  [ ] ECMO  [x] LVAD  [ x] Ventilator  [ ] pacemaker [ ] Impella.                      [x] ABG's     [x] Pulse Oxymetry Monitoring  Bedside evaluation , monitoring , treatment of hemodynamics , fluids , IVP/ IVCD meds.        Diagnosis:     Admitted to CTU on  for Melena with anemia  /  LVAD Patient    POD  - Tracheostomy     CHF- acute [x]   chronic [x]    systolic [x]   diatolic [ ]          - Echo- EF -  BiV           [x] RV dysfunction          - Cxr-cardiomegally, edema          - Clinical-  [ ]inotropes   [ ]pressors   []diuresis   [ ]IABP   [ ]ECMO   [x]LVAD   [x]Respiratory Failure.     Respiratory Failure     LVAD Circuit    GI bleed s/p Enteroscopy on     Ventilator Management:  [ ]AC-rest    [ ]CPAP-PS Wean    [x ]Trach Collar     [ ]Extubate    [ ] T-Piece  [ ]peep>5     Hypovolemia     Anemia in setting of Melena     Chronic Kidney Disease     Difficult weaning process - multiple organ system involvement in critically ill patient    C Diff positive on     DMT2 - admelog sliding Scale     Hyponatremia     Requires chest PT, pulmonary toilet, ambu bagging, suctioning to maintain SaO2,  patent airway and treat atelectasis.    Requires bedside physical therapy, mobilization and total nursing home care.    Tolerates NG / NJ feeds at [x] goal rate    [ ] trophic rate    [ ]       rate               By signing my name below, I, Agnieszka Cherry, attest that this documentation has been prepared under the direction and in the presence of Luis Fernando Thompson MD.   Electronically Signed: Cesia Shaffer 21 @ 06:57      Discussed with CT surgeon, Physician Assistant - Nurse Practitioner- Critical care medicine team.   Discussed at  AM / PM rounds.   Chart, labs , films reviewed.    Cumulative Critical Care Time Given Today:  CRITICAL CARE ATTENDING - CTICU    MEDICATIONS  (STANDING):  aMIOdarone    Tablet 200 milliGRAM(s) Oral daily  clonazePAM  Tablet 0.5 milliGRAM(s) Oral at bedtime  insulin lispro (ADMELOG) corrective regimen sliding scale   SubCutaneous every 6 hours  metoprolol tartrate 25 milliGRAM(s) Oral every 12 hours  pantoprazole   Suspension 40 milliGRAM(s) Enteral Tube daily  psyllium Powder 1 Packet(s) Oral daily  vancomycin    Solution 125 milliGRAM(s) Oral every 12 hours                                    10.9   7.93  )-----------( 206      ( 2021 00:28 )             36.4       07-19    133<L>  |  93<L>  |  19  ----------------------------<  127<H>  4.6   |  27  |  0.88    Ca    10.6<H>      2021 00:28  Phos  4.0       Mg     2.2         TPro  9.8<H>  /  Alb  4.9  /  TBili  0.3  /  DBili  x   /  AST  28  /  ALT  22  /  AlkPhos  114  19      PT/INR - ( 2021 15:21 )   PT: 22.8 sec;   INR: 1.96 ratio         PTT - ( 2021 15:21 )  PTT:58.4 sec        Daily     Daily Weight in k.6 (2021 00:00)      -17 @ : @ 07:00  --------------------------------------------------------  IN: 1974 mL / OUT: 1550 mL / NET: 424 mL     @ 07: @ 06:57  --------------------------------------------------------  IN: 1704 mL / OUT: 450 mL / NET: 1254 mL          Critically Ill patient  : [ ] preoperative ,   [ ] post operative    [x] non operative    Requires :  [ ] Arterial Line   [ ] Central Line  [ ] PA catheter  [ ] IABP  [ ] ECMO  [x] LVAD  [ x] Ventilator  [ ] pacemaker [ ] Impella.                      [x] ABG's     [x] Pulse Oxymetry Monitoring  Bedside evaluation , monitoring , treatment of hemodynamics , fluids , IVP/ IVCD meds.        Diagnosis:     Admitted to CTU on  for Melena with anemia  /  LVAD Patient    POD  - Tracheostomy     CHF- acute [x]   chronic [x]    systolic [x]   diatolic [ ]          - Echo- EF -  BiV           [x] RV dysfunction          - Cxr-cardiomegally, edema          - Clinical-  [ ]inotropes   [ ]pressors   []diuresis   [ ]IABP   [ ]ECMO   [x]LVAD   [x]Respiratory Failure.     Respiratory Failure     LVAD Circuit    GI bleed s/p Enteroscopy on     Ventilator Management:  [ ]AC-rest    [ ]CPAP-PS Wean    [x ]Trach Collar     [ ]Extubate    [ ] T-Piece  [ ]peep>5     Hypovolemia     Anemia in setting of Melena     Chronic Kidney Disease     Difficult weaning process - multiple organ system involvement in critically ill patient    C Diff positive on     DMT2 - admelog sliding Scale     Hyponatremia     Requires chest PT, pulmonary toilet, ambu bagging, suctioning to maintain SaO2,  patent airway and treat atelectasis.    Requires bedside physical therapy, mobilization and total snf care.    Tolerates NG / NJ feeds at [x] goal rate    [ ] trophic rate    [ ]       rate             By signing my name below, I, Agnieszka Cherry, attest that this documentation has been prepared under the direction and in the presence of Luis Fernando Thompson MD.   Electronically Signed: Cesia Shaffer 21 @ 06:57      Discussed with CT surgeon, Physician Assistant - Nurse Practitioner- Critical care medicine team.   Discussed at  AM / PM rounds.   Chart, labs , films reviewed.    Cumulative Critical Care Time Given Today:  CRITICAL CARE ATTENDING - CTICU    MEDICATIONS  (STANDING):  aMIOdarone    Tablet 200 milliGRAM(s) Oral daily  clonazePAM  Tablet 0.5 milliGRAM(s) Oral at bedtime  insulin lispro (ADMELOG) corrective regimen sliding scale   SubCutaneous every 6 hours  metoprolol tartrate 25 milliGRAM(s) Oral every 12 hours  pantoprazole   Suspension 40 milliGRAM(s) Enteral Tube daily  psyllium Powder 1 Packet(s) Oral daily  vancomycin    Solution 125 milliGRAM(s) Oral every 12 hours                                    10.9   7.93  )-----------( 206      ( 2021 00:28 )             36.4       07-19    133<L>  |  93<L>  |  19  ----------------------------<  127<H>  4.6   |  27  |  0.88    Ca    10.6<H>      2021 00:28  Phos  4.0       Mg     2.2         TPro  9.8<H>  /  Alb  4.9  /  TBili  0.3  /  DBili  x   /  AST  28  /  ALT  22  /  AlkPhos  114  19      PT/INR - ( 2021 15:21 )   PT: 22.8 sec;   INR: 1.96 ratio         PTT - ( 2021 15:21 )  PTT:58.4 sec        Daily     Daily Weight in k.6 (2021 00:00)      -17 @ : @ 07:00  --------------------------------------------------------  IN: 1974 mL / OUT: 1550 mL / NET: 424 mL     @ 07: @ 06:57  --------------------------------------------------------  IN: 1704 mL / OUT: 450 mL / NET: 1254 mL          Critically Ill patient  : [ ] preoperative ,   [ ] post operative    [x] non operative    Requires :  [ ] Arterial Line   [ ] Central Line  [ ] PA catheter  [ ] IABP  [ ] ECMO  [x] LVAD  [ x] Ventilator  [ ] pacemaker [ ] Impella.                      [x] ABG's     [x] Pulse Oxymetry Monitoring  Bedside evaluation , monitoring , treatment of hemodynamics , fluids , IVP/ IVCD meds.        Diagnosis:     Admitted to CTU on  for Melena with anemia  /  LVAD Patient    POD  - Tracheostomy     CHF- acute [x]   chronic [x]    systolic [x]   diatolic [ ]          - Echo- EF -  BiV           [x] RV dysfunction          - Cxr-cardiomegally, edema          - Clinical-  [ ]inotropes   [ ]pressors   []diuresis   [ ]IABP   [ ]ECMO   [x]LVAD   [x]Respiratory Failure.     Respiratory Failure - aspiration pneumonia    LVAD Circuit    GI bleed s/p Enteroscopy on     Ventilator Management:  [ ]AC-rest    [ ]CPAP-PS Wean    [x ]Trach Collar     [ ]Extubate    [ ] T-Piece  [ ]peep>5     Hypovolemia     Anemia in setting of Melena     Chronic Kidney Disease     Difficult weaning process - multiple organ system involvement in critically ill patient    C Diff positive on     DMT2 - admelog sliding Scale     Hyponatremia     Requires chest PT, pulmonary toilet, ambu bagging, suctioning to maintain SaO2,  patent airway and treat atelectasis.    Requires bedside physical therapy, mobilization and total group home care.    Tolerates NG / NJ feeds at [x] goal rate    [ ] trophic rate    [ ]       rate             By signing my name below, I, Agnieszka Cherry, attest that this documentation has been prepared under the direction and in the presence of Luis Fernando Thompson MD.   Electronically Signed: Cesia Shaffer - @ 06:57    I, Luis Fernando Thompson, personally performed the services described in this documentation. All medical record entries made by the scribe were at my direction and in my presence. I have reviewed the chart and agree that the record reflects my personal performance and is accurate and complete.   Luis Fernando Thompson MD.       Discussed with CT surgeon, Physician Assistant - Nurse Practitioner- Critical care medicine team.   Discussed at  AM / PM rounds.   Chart, labs , films reviewed.    Cumulative Critical Care Time Given Today:  30 min

## 2021-07-20 NOTE — PROGRESS NOTE ADULT - PROBLEM SELECTOR PLAN 1
-Continue metoprolol tartrate 25 bid  - diuretics held in the setting of fluid losses   - Continue home amiodarone 200 mg PO daily

## 2021-07-20 NOTE — PROGRESS NOTE ADULT - SUBJECTIVE AND OBJECTIVE BOX
Patient seen and examined at bedside.    Overnight Events: No acute events overnight, noted to be tachy this am and complaining of abdominal discomfort.     Review Of Systems: No chest pain, shortness of breath, or palpitations            Current Meds:  acetaminophen    Suspension .. 650 milliGRAM(s) Oral every 6 hours PRN  aMIOdarone    Tablet 200 milliGRAM(s) Oral daily  clonazePAM  Tablet 0.5 milliGRAM(s) Oral at bedtime  hydrALAZINE 25 milliGRAM(s) Oral every 8 hours PRN  insulin lispro (ADMELOG) corrective regimen sliding scale   SubCutaneous every 6 hours  metoprolol tartrate 25 milliGRAM(s) Oral every 12 hours  pantoprazole   Suspension 40 milliGRAM(s) Enteral Tube daily  psyllium Powder 1 Packet(s) Oral daily  vancomycin    Solution 125 milliGRAM(s) Oral every 12 hours      Vitals:  T(F): 98.6 (07-20), Max: 98.6 (07-20)  HR: 96 (07-20) (90 - 142)  BP: 95/72 (07-20) (86/59 - 135/92)  RR: 22 (07-20)  SpO2: 100% (07-20)  I&O's Summary    19 Jul 2021 07:01  -  20 Jul 2021 07:00  --------------------------------------------------------  IN: 1200 mL / OUT: 850 mL / NET: 350 mL    20 Jul 2021 07:01  -  20 Jul 2021 12:12  --------------------------------------------------------  IN: 300 mL / OUT: 0 mL / NET: 300 mL        Physical Exam:  Appearance: No acute distress; well appearing  Cardiovascular: RRR, S1, S2, no murmurs, rubs, or gallops; no edema; no JVD  Respiratory: Clear to auscultation bilaterally  Gastrointestinal: soft, mild ttp in lower abdomen  Musculoskeletal: No clubbing; no joint deformity   Neurologic: Non-focal  Psychiatry: AAOx3, mood & affect appropriate  Skin: No rashes, ecchymoses, or cyanosis                          10.5   9.33  )-----------( 256      ( 20 Jul 2021 01:00 )             34.0     07-20    132<L>  |  95<L>  |  20  ----------------------------<  136<H>  4.6   |  27  |  0.84    Ca    10.6<H>      20 Jul 2021 01:00  Phos  3.8     07-20  Mg     2.0     07-20    TPro  9.5<H>  /  Alb  4.7  /  TBili  0.3  /  DBili  x   /  AST  26  /  ALT  22  /  AlkPhos  108  07-20    PT/INR - ( 19 Jul 2021 14:56 )   PT: 24.1 sec;   INR: 2.08 ratio      PTT - ( 18 Jul 2021 15:21 )  PTT:58.4 sec

## 2021-07-20 NOTE — PROGRESS NOTE ADULT - ATTENDING COMMENTS
Complains of diffuse mild abdominal pain. As per nursing staff, no further diarrhea.  Abdomen is soft but mildly tender with deep palpation.   HR is mildly elevated, likely due to pain.  No VAD alarms, looks euvolemic.   Will obtain KUB and monitor closely.   Continue trach care as per CTS.   No acute cardiac issues.

## 2021-07-20 NOTE — PROGRESS NOTE ADULT - PROBLEM SELECTOR PLAN 4
-Appr ID recs; acute Cdiff colitis  -Contact precautions  -C/w vanco PO  - abdominal discomfort, please obtain KUB

## 2021-07-20 NOTE — PROGRESS NOTE ADULT - ASSESSMENT
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 coli UTIs, driveline drainage (1/7/21) and COVID-19 (back in April 2020)  He was recently seen in clinic where he complained of abdominal pain and dark stools w constipation back in May. He presents to [facility] ER today weakness and fatigue, moderate and + Black stools for three days, on coumadin secondary to warfarin use in the setting of an LVAD. Patient has required transfusions for GIB in the past. Mostly recently back in jan 2021 pt had anemia with dark stools. No interventions was done at that time. However Last Endoscopy was done in July 2020 (negative). Today labs show patient is anemic with H/H of 4.5/16.3,. INR is 8.84 MAP in the 90s, Temp 35.1. He denies any chest pain, shortness of breath, dizziness, abd pain, nausea or vomiting.      Surgery/Hospital Course:  6/14 admit for melena w/ anemia, INR 8.84   6/15 Capsul study (+) for small bowel bleed, balloon endoscopy (old blood in prox ileum); post EGD - septic w/ L opacity, re-intubated for concern for aspiration, TTE (Mod MR, decrease biV w/ interventricular septum boweing towards R)  6/17 bronch   6/20 +C Diff   6/22 TC   6/22 CT C/A/P: Fluid filled colon which may be 2/2 rapid transit. Small bilateral pleffs with associates. Compressive atelectasis New ISABELLE & LLL  parenchymal opacities, suspicious for pneumonia. 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   6/25 #8 Shiley trach at bedside   6/28 CPAP trials   7/1 LVAD speed increased to 9200  7/2 Bronch; Central line dc'ed  7/20 TC since 7/7, continue as tolerated. Patient transferred to SDU.

## 2021-07-20 NOTE — PROGRESS NOTE ADULT - SUBJECTIVE AND OBJECTIVE BOX
VITAL SIGNS    Telemetry:  SR 88  Vital Signs Last 24 Hrs  T(C): 36.8 (21 @ 19:10), Max: 37 (21 @ 08:00)  T(F): 98.3 (21 @ 19:10), Max: 98.6 (21 @ 08:00)  HR: 112 (21 @ 19:10) (90 - 142)  BP: 109/76 (21 @ 19:10) (86/59 - 135/92)  RR: 18 (21 @ 19:10) (12 - 26)  SpO2: 100% (21 @ 19:10) (98% - 100%)             @ 07:01  -   @ 07:00  --------------------------------------------------------  IN: 1200 mL / OUT: 850 mL / NET: 350 mL     @ 07:01  -   @ 21:07  --------------------------------------------------------  IN: 720 mL / OUT: 180 mL / NET: 540 mL       Daily     Daily Weight in k.7 (2021 00:00)  Admit Wt: Drug Dosing Weight  Height (cm): 177.8 (15 Jamil 2021 12:14)  Weight (kg): 78.5 (15 Jamil 2021 12:14)  BMI (kg/m2): 24.8 (15 Jamil 2021 12:14)  BSA (m2): 1.96 (15 Jamil 2021 12:14)    Bilirubin Total, Serum: 0.3 mg/dL ( @ 01:00)    CAPILLARY BLOOD GLUCOSE      POCT Blood Glucose.: 138 mg/dL (2021 18:07)  POCT Blood Glucose.: 131 mg/dL (2021 12:15)  POCT Blood Glucose.: 139 mg/dL (2021 05:24)  POCT Blood Glucose.: 135 mg/dL (2021 23:45)          MEDICATIONS  acetaminophen    Suspension .. 650 milliGRAM(s) Oral every 6 hours PRN  aMIOdarone    Tablet 200 milliGRAM(s) Oral daily  clonazePAM  Tablet 0.5 milliGRAM(s) Oral at bedtime  hydrALAZINE 25 milliGRAM(s) Oral every 8 hours PRN  insulin lispro (ADMELOG) corrective regimen sliding scale   SubCutaneous every 6 hours  metoprolol tartrate 25 milliGRAM(s) Oral every 12 hours  pantoprazole   Suspension 40 milliGRAM(s) Enteral Tube daily  psyllium Powder 1 Packet(s) Oral daily      >>> <<<  PHYSICAL EXAM  Subjective: "Trach"  Neurology: alert and oriented x 3, nonfocal, no gross deficits  CV : RRR S1S2, no murmurs, rubs or gallops   Lungs: CTA B/L, No wheezing, rales, rhonchi. Non labored respirations   Abdomen: soft, NT,ND, (- )BM  :  voiding  Extremities: No LE edema bilaterally, +DP, No calf tenderness     LABS      132<L>  |  95<L>  |  20  ----------------------------<  136<H>  4.6   |  27  |  0.84    Ca    10.6<H>      2021 01:00  Phos  3.8     07-20  Mg     2.0     07-20    TPro  9.5<H>  /  Alb  4.7  /  TBili  0.3  /  DBili  x   /  AST  26  /  ALT  22  /  AlkPhos  108  07-20                                 10.5   9.33  )-----------( 256      ( 2021 01:00 )             34.0          PT/INR - ( 2021 13:44 )   PT: 26.2 sec;   INR: 2.27 ratio                PAST MEDICAL & SURGICAL HISTORY:  CHF (congestive heart failure)    CAD (coronary artery disease)    Depression    Pleural effusion    History of 2019 novel coronavirus disease (COVID-19)  2020    Hemorrhoids    Bleeding hemorrhoids    Peripheral arterial disease    Claudication    BPH with urinary obstruction    ACC/AHA stage D systolic heart failure    Anticoagulation goal of INR 2.0 to 2.5    Falls    Clavicle fracture    CKD (chronic kidney disease), stage III    Iron deficiency anemia    H/O epistaxis    Vertigo    GI bleed    S/P TVR (tricuspid valve repair)    S/P ventricular assist device    S/P endoscopy

## 2021-07-20 NOTE — PROGRESS NOTE ADULT - SUBJECTIVE AND OBJECTIVE BOX
RICKY JOINT  MRN#: [id number]  Subjective:  Pulmonary progress  : recurrent Acute hypoxic respiratory Failure ,aspiration pneumonia, NICM  , chart reviewed and H/O obtained radiological and Laboratory study reviewed patient Examined     65M PMH ACC/AHA stage D HF due to NICM HM2 LVAD , TV annuloplasty ring 17 as destination therapy due to severe peripheral artery disease with significant stenosis  SIADH, Depression, CKD-3 with hyperkalemia, past E. coli UTIs, driveline drainage (21) and COVID-19 (back in 2020)  He was recently seen in clinic where he complained of abdominal pain and dark stools w constipation back in May. He presents to [facility] ER today weakness and fatigue, moderate and + Black stools for three days, on coumadin secondary to warfarin use in the setting of an LVAD. Patient has required transfusions for GIB in the past. Mostly recently back in 2021 pt had anemia with dark stools. No interventions was done at that time. However Last Endoscopy was done in 2020 (negative). Today labs show patient is anemic with H/H of 4.5/16.3,. INR is 8.84 MAP in the 90s, Temp 35.1. He denies any chest pain, shortness of breath, dizziness, abd pain, nausea or vomiting. found to have  rectal bleeding underwent endoscopy ,old blood in the proximal ileum ,  develop sepsis with LL opacity given Antibiotics , Extubated , reintubated , Bronchoscopy on Zosyn for LL pneumonia  and Amiodarone S/P TV Annuloplasty , patient remain intubated on full ventilatory support .S/P multiple units of blood transfusion , remain on full ventilatory support on Precedex and propofol , new central IJ line , diarrhea C diff. +ve on po vancomycin and IV Flagyl,  mildly distended belly , fever start on cefepime 2gm q 8 hrs S/P tracheostomy .  new RT Subclavian central line continue on contact  isolation ,still diarrhea on C-diff antibiotics ENT follow up appreciated , trial of C-PAP as tolerated , , copious secretion from trach. site chest x ray left lower lobe pneumonia , tolerating trch. collar 50% FI02 still excessive secretion need pulmonary toilet , off Ancef antibiotic , no more diarrhea back on full support mechanical ventilator , chest x ray show improvement in LLL air space disease, more awake and responsive on tube feeding no more diarrhea , S/P  Ancef for bacteremia no fever ,ID follow up noted ,  no nausea or vomiting or diarrhea still very weak and tired , event note pulled NG tube now replaced , back on tube feeding ,still on po vancomycin , getting PT and OT at bed side , no plan for decannulation for now. , no more diarrhea receiving PT and OPT at bed side , minimal secretion from tracheostomy site , no SOB getting stronger , improve muscle tone          (2021 16:57)    PAST MEDICAL & SURGICAL HISTORY:  CHF (congestive heart failure)    CAD (coronary artery disease)  Depression    Pleural effusion    History of 2019 novel coronavirus disease (COVID-19)  2020    Hemorrhoids    Bleeding hemorrhoids    Peripheral arterial disease    Claudication    BPH with urinary obstruction    ACC/AHA stage D systolic heart failure  Anticoagulation goal of INR 2.0 to 2.5    Falls    Clavicle fracture    CKD (chronic kidney disease), stage III    Iron deficiency anemia    H/O epistaxis    Vertigo    GI bleed    S/P TVR (tricuspid valve repair)    S/P ventricular assist device    S/P endoscopy    OBJECTIVE:  ICU Vital Signs Last 24 Hrs  T(C): 38.2 (2021 10:00), Max: 38.5 (2021 12:00)  T(F): 100.8 (2021 10:00), Max: 101.3 (2021 12:00)  HR: 65 (2021 10:00) (61 - 69)  BP: --  BP(mean): --  ABP: 105/67 (2021 10:00) (90/54 - 113/64)  ABP(mean): 77 (2021 10:00) (63 - 77)  RR: 20 (2021 10:00) (19 - 35)  SpO2: 99% (2021 10:00) (96% - 100%)       @ 07:  -  - @ 07:00  --------------------------------------------------------  IN: 2693.9 mL / OUT: 1415 mL / NET: 1278.9 mL     @ 07:01  -  06-20 @ 10:49  --------------------------------------------------------  IN: 420.8 mL / OUT: 115 mL / NET: 305.8 mL  PHYSICAL EXAM:Daily   Elderly male S/P tracheostomy   on full ventilatory support /40%20/5cm PEEP  , alternating with trach. cholar on IV heparin   Daily Weight in k.4 (2021 00:00)  HEENT:     + NCAT  + EOMI  - Conjuctival edema   - Icterus   - Thrush   - ETT  + NGT/OGT  Neck:         + FROM   RT SC line  JVD     - Nodes     - Masses    + Mid-line trachea + Tracheostomy  Chest: normal findings  Lungs:          + CTA   + Rhonchi    - Rales    - Wheezing + Decreased  LT BS   - Dullness R L  Cardiac:       + S1 + S2    + RRR   - Irregular   - S3  - S4    - Murmurs   - Rub   - Hamman’s sign   Abdomen:    + BS     + Soft    + Non-tender     - Distended    - Organomegaly  - PEG  Extremities:   - Cyanosis U/L   - Clubbing  U/L  + LE/UE Edema   + Capillary refill    + Pulses   Neuro:        - Awake   -  Alert   - Confused   - Lethargic   + Sedated  + Generalized Weakness  Skin:        - Rashes    - Erythema   + Normal incisions   + IV sites intact          HOSPITAL MEDICATIONS: All mediciations reviewed and analyzed  MEDICATIONS  (STANDING):  aMIOdarone    Tablet 200 milliGRAM(s) Oral daily  chlorhexidine 0.12% Liquid 15 milliLiter(s) Oral Mucosa every 12 hours  chlorhexidine 2% Cloths 1 Application(s) Topical <User Schedule>  dexMEDEtomidine Infusion 0.5 MICROgram(s)/kG/Hr (9.81 mL/Hr) IV Continuous <Continuous>  dextrose 50% Injectable 50 milliLiter(s) IV Push every 15 minutes  heparin  Infusion 400 Unit(s)/Hr (12.5 mL/Hr) IV Continuous <Continuous>  HYDROmorphone  Injectable 0.5 milliGRAM(s) IV Push once  insulin lispro (ADMELOG) corrective regimen sliding scale   SubCutaneous every 6 hours  pantoprazole  Injectable 40 milliGRAM(s) IV Push every 12 hours  piperacillin/tazobactam IVPB.. 3.375 Gram(s) IV Intermittent every 8 hours  propofol Infusion 20 MICROgram(s)/kG/Min (9.42 mL/Hr) IV Continuous <Continuous>  sodium chloride 0.9% lock flush 3 milliLiter(s) IV Push every 8 hours  sodium chloride 0.9%. 1000 milliLiter(s) (10 mL/Hr) IV Continuous <Continuous>    MEDICATIONS  (PRN):  acetaminophen    Suspension .. 650 milliGRAM(s) Oral every 6 hours PRN Temp greater or equal to 38C (100.4F)    LABS: All Lab data reviewed and analyzed                                   10.5   9.33  )-----------( 256      ( 2021 01:00 )             34.0    07-20    132<L>  |  95<L>  |  20  ----------------------------<  136<H>  4.6   |  27  |  0.84    Ca    10.6<H>      2021 01:00  Phos  3.8     07-20  Mg     2.0     07-20    TPro  9.5<H>  /  Alb  4.7  /  TBili  0.3  /  DBili  x   /  AST  26  /  ALT  22  /  AlkPhos  108  07-20      Ca    10.6<H>      2021 00:28  Phos  4.0     07-19  Mg     2.2     07-19    TPro  9.8<H>  /  Alb  4.9  /  TBili  0.3  /  DBili  x   /  AST  28  /  ALT  22  /  AlkPhos  114  07-19    Ca    10.7<H>      2021 01:04  Phos  3.5     07-18  Mg     2.0     07-18    TPro  9.5<H>  /  Alb  4.4  /  TBili  0.3  /  DBili  x   /  AST  26  /  ALT  23  /  AlkPhos  108  07-18    Ca    10.2      2021 01:25  Phos  3.4     07-17  Mg     1.9     07-17    TPro  9.0<H>  /  Alb  4.5  /  TBili  0.3  /  DBili  x   /  AST  28  /  ALT  21  /  AlkPhos  95  07-17                                                     PTT - ( 2021 04:52 )  PTT:45.2 sec LIVER FUNCTIONS - ( 2021 00:42 )  Alb: 3.4 g/dL / Pro: 6.7 g/dL / ALK PHOS: 213 U/L / ALT: 15 U/L / AST: 24 U/L / GGT: x           RADIOLOGY: - Reviewed and analyzed LLL  pneumonia , LVAD HM2, CT scan of abdomen reviewed result noted

## 2021-07-20 NOTE — PROGRESS NOTE ADULT - PROBLEM SELECTOR PLAN 4
-Appr ID recs; acute Cdiff colitis  -Contact precautions  -C/w vanco PO Taper   - 7/20 tapered to Vanco 125 QD

## 2021-07-20 NOTE — PROGRESS NOTE ADULT - SUBJECTIVE AND OBJECTIVE BOX
RICKY HAMPTON  MRN-[id number]  Patient is a 66y old  Male who presents with a chief complaint of Anemia, Supratherapeutic INR, Dark Stools (2021 19:12)    HPI:  65M PMH ACC/AHA stage D HF due to NICM HM2 LVAD , TV annuloplasty ring 17 as destination therapy due to severe peripheral artery disease with significant stenosis  SIADH, Depression, CKD-3 with hyperkalemia, past E. coli UTIs, driveline drainage (21) and COVID-19 (back in 2020)  He was recently seen in clinic where he complained of abdominal pain and dark stools w constipation back in May. He presents to [facility] ER today weakness and fatigue, moderate and + Black stools for three days, on coumadin secondary to warfarin use in the setting of an LVAD. Patient has required transfusions for GIB in the past. Mostly recently back in 2021 pt had anemia with dark stools. No interventions was done at that time. However Last Endoscopy was done in 2020 (negative). Today labs show patient is anemic with H/H of 4.5/16.3,. INR is 8.84 MAP in the 90s, Temp 35.1. He denies any chest pain, shortness of breath, dizziness, abd pain, nausea or vomiting.       (2021 16:57)      Surgery/Hospital Course:   admit for melena w/ anemia, INR 8.84   6/15 Capsul study (+) for small bowel bleed, balloon endoscopy (old blood in prox ileum); post EGD - septic w/ L opacity, re-intubated for concern for aspiration, TTE (Mod MR, decrease biV w/ interventricular septum boweing towards R)   bronch    TC    CT C/A/P: Fluid filled colon which may be 2/2 rapid transit. Small bilateral pleffs with associates. Compressive atelectasis New ISABELLE & LLL  parenchymal opacities, suspicious for pneumonia. 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    #8 Shiley trach at bedside    CPAP trials    LVAD speed increased to 9200   Bronch; Central line dc'ed    Today:    ICU Vital Signs Last 24 Hrs  T(C): 37 (2021 08:00), Max: 37 (2021 08:00)  T(F): 98.6 (2021 08:00), Max: 98.6 (2021 08:00)  HR: 112 (2021 08:00) (85 - 119)  BP: 111/67 (2021 08:00) (86/59 - 120/75)  BP(mean): 83 (2021 08:00) (0 - 91)  ABP: --  ABP(mean): --  RR: 12 (2021 08:00) (12 - 60)  SpO2: 100% (2021 08:00) (99% - 100%)      Physical Exam:  Gen:  Awake, alert   CNS: non focal 	  Neck: no JVD,+TC  RES : clear , no wheezing              CVS: Regular  rhythm. Normal S1/S2  Abd: Soft, non-distended. Bowel sounds present.  Skin: No rash.  Ext:  no edema    ============================I/O===========================   I&O's Detail    2021 07:01  -  2021 07:00  --------------------------------------------------------  IN:    Enteral Tube Flush: 120 mL    Miscellaneous Tube Feedin mL  Total IN: 1200 mL    OUT:    Voided (mL): 850 mL  Total OUT: 850 mL    Total NET: 350 mL      2021 07:01  -  2021 08:19  --------------------------------------------------------  IN:    Miscellaneous Tube Feedin mL  Total IN: 60 mL    OUT:  Total OUT: 0 mL    Total NET: 60 mL        ============================ LABS =========================                        10.5   9.33  )-----------( 256      ( 2021 01:00 )             34.0     07-20    132<L>  |  95<L>  |  20  ----------------------------<  136<H>  4.6   |  27  |  0.84    Ca    10.6<H>      2021 01:00  Phos  3.8     07-20  Mg     2.0     07-20    TPro  9.5<H>  /  Alb  4.7  /  TBili  0.3  /  DBili  x   /  AST  26  /  ALT  22  /  AlkPhos  108  07-20    LIVER FUNCTIONS - ( 2021 01:00 )  Alb: 4.7 g/dL / Pro: 9.5 g/dL / ALK PHOS: 108 U/L / ALT: 22 U/L / AST: 26 U/L / GGT: x           PT/INR - ( 2021 14:56 )   PT: 24.1 sec;   INR: 2.08 ratio         PTT - ( 2021 15:21 )  PTT:58.4 sec      ======================Micro/Rad/Cardio=================  Culture: Reviewed   CXR: Reviewed  Echo: Reviewed  ======================================================  PAST MEDICAL & SURGICAL HISTORY:  CHF (congestive heart failure)    CAD (coronary artery disease)    Depression    Pleural effusion    History of 2019 novel coronavirus disease (COVID-19)  2020    Hemorrhoids    Bleeding hemorrhoids    Peripheral arterial disease    Claudication    BPH with urinary obstruction    ACC/AHA stage D systolic heart failure    Anticoagulation goal of INR 2.0 to 2.5    Falls    Clavicle fracture    CKD (chronic kidney disease), stage III    Iron deficiency anemia    H/O epistaxis    Vertigo    GI bleed    S/P TVR (tricuspid valve repair)    S/P ventricular assist device    S/P endoscopy      ====================ASSESSMENT ==============  Stage D Nonischemic Cardiomyopathy, Status Post HM2 on 2017   Recent driveline infection on 2021   Cardiogenic shock  Hemodynamic instability   Acute hypoxemic respiratory failure  GI bleed , Status Post Enteroscopy   Anemia, in setting of melena   Chronic Kidney Disease  Stress hyperglycemia   C.diff positive on    Hypovolemic shock  Hyponatremia     Plan:  ====================== NEUROLOGY=====================  Nonfocal, continue to monitor neuro status   Tylenol PRN for fevers/ analgesia   Klonopin for agitation   PT/ambulation as tolerated     acetaminophen    Suspension .. 650 milliGRAM(s) Oral every 6 hours PRN Mild Pain (1 - 3)  clonazePAM  Tablet 0.5 milliGRAM(s) Oral at bedtime  ==================== RESPIRATORY======================  Acute hypoxemic resp failure s/p #8 shiley trach on on ; TC since , continue as tolerated.   Continue close monitoring of respiratory rate and breathing pattern, following of ABG’s with A-line monitoring, continuous pulse oximetry monitoring.   Mechanical Ventilation:      ====================CARDIOVASCULAR==================  Stage D Nonischemic Cardiomyopathy, Status Post HM2 on 2017; LVAD settings 9200 with flow of 5.  Echo 6/15: severe global Left ventricular systolic dysfunction with HM2 in place, decreased Right ventricular systolic function, interventricular septums bows slightly to right, otherwise grossly similar to prior.   Invasive hemodynamic monitoring, assess perfusion indices.   Continue rate control with Amiodarone   Pressor support with Hydralazine   Plan to place CVL for CVP monitoring    aMIOdarone    Tablet 200 milliGRAM(s) Oral daily  hydrALAZINE 25 milliGRAM(s) Oral every 8 hours PRN MAP greater than 85  ===================HEMATOLOGIC/ONC ===================  Anemia due to acute blood loss associated with small bowel bleed;   Monitor H&H/Plts      ===================== RENAL =========================  Continue to monitor I/Os, BUN/Creatinine, and urine output.   Goal net negative fluid balance. Replete lytes PRN. Keep K> 4 and Mg >2.     ==================== GASTROINTESTINAL===================  Tolerating TF, Jevity 1.2 Tank @ goal 60 cc/hr.   Continue Protonix for stress ulcer prophylaxis.     pantoprazole   Suspension 40 milliGRAM(s) Enteral Tube daily  psyllium Powder 1 Packet(s) Oral daily  =======================    ENDOCRINE  =====================  A1c 5.6   Hx of DMT2, glycemic control with Admelog sliding scale   Monitor blood glucose levels.     insulin lispro (ADMELOG) corrective regimen sliding scale   SubCutaneous every 6 hours  ========================INFECTIOUS DISEASE================  TMAX 98.6F, WBC within normal limits.   Monitor temperature and trend WBC.   : C.Diff positive requiring vancomycin     vancomycin    Solution 125 milliGRAM(s) Oral every 12 hours      I have spent 35 minutes providing acute care for this critically ill patient     Patient requires continuous monitoring with bedside rhythm monitoring, pulse ox monitoring, and intermittent blood gas analysis. Care plan discussed with ICU care team. Patient remained critical and at risk for life threatening decompensation.     By signing my name below, I, Brian Robin, attest that this documentation has been prepared under the direction and in the presence of Kota Thomas MD   Electronically signed: Romel Gonzalez, 21 @ 08:19    I, Kota Thomas, personally performed the services described in this documentation. all medical record entries made by the romel were at my direction and in my presence. I have reviewed the chart and agree that the record reflects my personal performance and is accurate and complete  Electronically signed: Kota Thomas MD      RICKY HAMPTON  MRN-[id number]  Patient is a 66y old  Male who presents with a chief complaint of Anemia, Supratherapeutic INR, Dark Stools (2021 19:12)    HPI:  65M PMH ACC/AHA stage D HF due to NICM HM2 LVAD , TV annuloplasty ring 17 as destination therapy due to severe peripheral artery disease with significant stenosis  SIADH, Depression, CKD-3 with hyperkalemia, past E. coli UTIs, driveline drainage (21) and COVID-19 (back in 2020)  He was recently seen in clinic where he complained of abdominal pain and dark stools w constipation back in May. He presents to [facility] ER today weakness and fatigue, moderate and + Black stools for three days, on coumadin secondary to warfarin use in the setting of an LVAD. Patient has required transfusions for GIB in the past. Mostly recently back in 2021 pt had anemia with dark stools. No interventions was done at that time. However Last Endoscopy was done in 2020 (negative). Today labs show patient is anemic with H/H of 4.5/16.3,. INR is 8.84 MAP in the 90s, Temp 35.1. He denies any chest pain, shortness of breath, dizziness, abd pain, nausea or vomiting.       (2021 16:57)      Surgery/Hospital Course:   admit for melena w/ anemia, INR 8.84   6/15 Capsul study (+) for small bowel bleed, balloon endoscopy (old blood in prox ileum); post EGD - septic w/ L opacity, re-intubated for concern for aspiration, TTE (Mod MR, decrease biV w/ interventricular septum boweing towards R)   bronch    TC    CT C/A/P: Fluid filled colon which may be 2/2 rapid transit. Small bilateral pleffs with associates. Compressive atelectasis New ISABELLE & LLL  parenchymal opacities, suspicious for pneumonia. 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    #8 Shiley trach at bedside    CPAP trials    LVAD speed increased to 9200   Bronch; Central line dc'ed    Today:  Patient has remained on TC since , continue as tolerated. Patient is a candidate for the floors.     ICU Vital Signs Last 24 Hrs  T(C): 37 (2021 08:00), Max: 37 (2021 08:00)  T(F): 98.6 (2021 08:00), Max: 98.6 (2021 08:00)  HR: 112 (2021 08:00) (85 - 119)  BP: 111/67 (2021 08:00) (86/59 - 120/75)  BP(mean): 83 (2021 08:00) (0 - 91)  ABP: --  ABP(mean): --  RR: 12 (2021 08:00) (12 - 60)  SpO2: 100% (:00) (99% - 100%)      Physical Exam:  Gen:  Awake, alert   CNS: non focal 	  Neck: no JVD,+TC  RES : clear , no wheezing              CVS: Regular  rhythm. Normal S1/S2  Abd: Soft, non-distended. Bowel sounds present.  Skin: No rash.  Ext:  no edema    ============================I/O===========================   I&O's Detail    2021 07:01  -  2021 07:00  --------------------------------------------------------  IN:    Enteral Tube Flush: 120 mL    Miscellaneous Tube Feedin mL  Total IN: 1200 mL    OUT:    Voided (mL): 850 mL  Total OUT: 850 mL    Total NET: 350 mL      2021 07:01  -  2021 08:19  --------------------------------------------------------  IN:    Miscellaneous Tube Feedin mL  Total IN: 60 mL    OUT:  Total OUT: 0 mL    Total NET: 60 mL        ============================ LABS =========================                        10.5   9.33  )-----------( 256      ( 2021 01:00 )             34.0     07-20    132<L>  |  95<L>  |  20  ----------------------------<  136<H>  4.6   |  27  |  0.84    Ca    10.6<H>      2021 01:00  Phos  3.8     07-20  Mg     2.0     07-20    TPro  9.5<H>  /  Alb  4.7  /  TBili  0.3  /  DBili  x   /  AST  26  /  ALT  22  /  AlkPhos  108  07-20    LIVER FUNCTIONS - ( 2021 01:00 )  Alb: 4.7 g/dL / Pro: 9.5 g/dL / ALK PHOS: 108 U/L / ALT: 22 U/L / AST: 26 U/L / GGT: x           PT/INR - ( 2021 14:56 )   PT: 24.1 sec;   INR: 2.08 ratio         PTT - ( 2021 15:21 )  PTT:58.4 sec      ======================Micro/Rad/Cardio=================  Culture: Reviewed   CXR: Reviewed  Echo: Reviewed  ======================================================  PAST MEDICAL & SURGICAL HISTORY:  CHF (congestive heart failure)    CAD (coronary artery disease)    Depression    Pleural effusion    History of 2019 novel coronavirus disease (COVID-19)  2020    Hemorrhoids    Bleeding hemorrhoids    Peripheral arterial disease    Claudication    BPH with urinary obstruction    ACC/AHA stage D systolic heart failure    Anticoagulation goal of INR 2.0 to 2.5    Falls    Clavicle fracture    CKD (chronic kidney disease), stage III    Iron deficiency anemia    H/O epistaxis    Vertigo    GI bleed    S/P TVR (tricuspid valve repair)    S/P ventricular assist device    S/P endoscopy      ====================ASSESSMENT ==============  Stage D Nonischemic Cardiomyopathy, Status Post HM2 on 2017   Recent driveline infection on 2021   Cardiogenic shock  Hemodynamic instability   Acute hypoxemic respiratory failure  GI bleed , Status Post Enteroscopy   Anemia, in setting of melena   Chronic Kidney Disease  Stress hyperglycemia   C.diff positive on    Hypovolemic shock  Hyponatremia     Plan:  ====================== NEUROLOGY=====================  Nonfocal, continue to monitor neuro status   Tylenol PRN for fevers/ analgesia   Klonopin for agitation   PT/ambulation as tolerated     acetaminophen    Suspension .. 650 milliGRAM(s) Oral every 6 hours PRN Mild Pain (1 - 3)  clonazePAM  Tablet 0.5 milliGRAM(s) Oral at bedtime  ==================== RESPIRATORY======================  Acute hypoxemic resp failure s/p #8 shiley trach on on ; TC since , continue as tolerated.   Continue close monitoring of respiratory rate and breathing pattern, following of ABG’s with A-line monitoring, continuous pulse oximetry monitoring.   Mechanical Ventilation:      ====================CARDIOVASCULAR==================  Stage D Nonischemic Cardiomyopathy, Status Post HM2 on 2017; LVAD settings 9200 with flow of 5.  Echo 6/15: severe global Left ventricular systolic dysfunction with HM2 in place, decreased Right ventricular systolic function, interventricular septums bows slightly to right, otherwise grossly similar to prior.   Invasive hemodynamic monitoring, assess perfusion indices.   Continue rate control with Amiodarone   Pressor support with Hydralazine   Plan to place CVL for CVP monitoring    aMIOdarone    Tablet 200 milliGRAM(s) Oral daily  hydrALAZINE 25 milliGRAM(s) Oral every 8 hours PRN MAP greater than 85  ===================HEMATOLOGIC/ONC ===================  Anemia due to acute blood loss associated with small bowel bleed;   Monitor H&H/Plts      ===================== RENAL =========================  Continue to monitor I/Os, BUN/Creatinine, and urine output.   Goal net negative fluid balance. Replete lytes PRN. Keep K> 4 and Mg >2.     ==================== GASTROINTESTINAL===================  Tolerating TF, Jevity 1.2 Tank @ goal 60 cc/hr.   Continue Protonix for stress ulcer prophylaxis.     pantoprazole   Suspension 40 milliGRAM(s) Enteral Tube daily  psyllium Powder 1 Packet(s) Oral daily  =======================    ENDOCRINE  =====================  A1c 5.6   Hx of DMT2, glycemic control with Admelog sliding scale   Monitor blood glucose levels.     insulin lispro (ADMELOG) corrective regimen sliding scale   SubCutaneous every 6 hours  ========================INFECTIOUS DISEASE================  TMAX 98.6F, WBC within normal limits.   Monitor temperature and trend WBC.   : C.Diff positive requiring vancomycin     vancomycin    Solution 125 milliGRAM(s) Oral every 12 hours      I have spent 35 minutes providing acute care for this critically ill patient     Patient requires continuous monitoring with bedside rhythm monitoring, pulse ox monitoring, and intermittent blood gas analysis. Care plan discussed with ICU care team. Patient remained critical and at risk for life threatening decompensation.     By signing my name below, I, Brian Robin, attest that this documentation has been prepared under the direction and in the presence of Kota Thomas MD   Electronically signed: Cesia Gonzalez, 21 @ 08:19    I, Kota Thomas, personally performed the services described in this documentation. all medical record entries made by the luisibe were at my direction and in my presence. I have reviewed the chart and agree that the record reflects my personal performance and is accurate and complete  Electronically signed: Kota Thomas MD

## 2021-07-20 NOTE — PROGRESS NOTE ADULT - PROBLEM SELECTOR PLAN 2
-Current speed 9200 RPM  -c/w coumadin for goal INR 2-3   - Plan to hold aspirin indefinitely given recurrent GIB.  -Continue to monitor LDH daily

## 2021-07-21 NOTE — PROGRESS NOTE ADULT - SUBJECTIVE AND OBJECTIVE BOX
RICKY JOINT  MRN#: [id number]  Subjective:  Pulmonary progress  : recurrent Acute hypoxic respiratory Failure ,aspiration pneumonia, NICM  , chart reviewed and H/O obtained radiological and Laboratory study reviewed patient Examined     65M PMH ACC/AHA stage D HF due to NICM HM2 LVAD , TV annuloplasty ring 17 as destination therapy due to severe peripheral artery disease with significant stenosis  SIADH, Depression, CKD-3 with hyperkalemia, past E. coli UTIs, driveline drainage (21) and COVID-19 (back in 2020)  He was recently seen in clinic where he complained of abdominal pain and dark stools w constipation back in May. He presents to [facility] ER today weakness and fatigue, moderate and + Black stools for three days, on coumadin secondary to warfarin use in the setting of an LVAD. Patient has required transfusions for GIB in the past. Mostly recently back in 2021 pt had anemia with dark stools. No interventions was done at that time. However Last Endoscopy was done in 2020 (negative). Today labs show patient is anemic with H/H of 4.5/16.3,. INR is 8.84 MAP in the 90s, Temp 35.1. He denies any chest pain, shortness of breath, dizziness, abd pain, nausea or vomiting. found to have  rectal bleeding underwent endoscopy ,old blood in the proximal ileum ,  develop sepsis with LL opacity given Antibiotics , Extubated , reintubated , Bronchoscopy on Zosyn for LL pneumonia  and Amiodarone S/P TV Annuloplasty , patient remain intubated on full ventilatory support .S/P multiple units of blood transfusion , remain on full ventilatory support on Precedex and propofol , new central IJ line , diarrhea C diff. +ve on po vancomycin and IV Flagyl,  mildly distended belly , fever start on cefepime 2gm q 8 hrs S/P tracheostomy .  new RT Subclavian central line continue on contact  isolation ,still diarrhea on C-diff antibiotics ENT follow up appreciated , trial of C-PAP as tolerated , , copious secretion from trach. site chest x ray left lower lobe pneumonia , tolerating trch. collar 50% FI02 still excessive secretion need pulmonary toilet , off Ancef antibiotic , no more diarrhea back on full support mechanical ventilator , chest x ray show improvement in LLL air space disease, more awake and responsive on tube feeding no more diarrhea , S/P  Ancef for bacteremia no fever ,ID follow up noted ,  no nausea or vomiting or diarrhea still very weak and tired , event note pulled NG tube now replaced , back on tube feeding ,still on po vancomycin , getting PT and OT at bed side , no plan for decannulation for now. , no more diarrhea receiving PT and OPT at bed side , minimal secretion from tracheostomy site , no SOB getting stronger , improve muscle tone patient transfer to monitor bed still on contact isolation for C-Difficel colitis on 50% FI02         (2021 16:57)    PAST MEDICAL & SURGICAL HISTORY:  CHF (congestive heart failure)    CAD (coronary artery disease)  Depression    Pleural effusion    History of 2019 novel coronavirus disease (COVID-19)  2020    Hemorrhoids    Bleeding hemorrhoids    Peripheral arterial disease    Claudication    BPH with urinary obstruction    ACC/AHA stage D systolic heart failure  Anticoagulation goal of INR 2.0 to 2.5    Falls    Clavicle fracture    CKD (chronic kidney disease), stage III    Iron deficiency anemia    H/O epistaxis    Vertigo    GI bleed    S/P TVR (tricuspid valve repair)    S/P ventricular assist device    S/P endoscopy    OBJECTIVE:  ICU Vital Signs Last 24 Hrs  T(C): 38.2 (2021 10:00), Max: 38.5 (2021 12:00)  T(F): 100.8 (2021 10:00), Max: 101.3 (2021 12:00)  HR: 65 (2021 10:00) (61 - 69)  BP: --  BP(mean): --  ABP: 105/67 (2021 10:00) (90/54 - 113/64)  ABP(mean): 77 (2021 10:00) (63 - 77)  RR: 20 (2021 10:00) (19 - 35)  SpO2: 99% (2021 10:00) (96% - 100%)       @ 07:01  -  - @ 07:00  --------------------------------------------------------  IN: 2693.9 mL / OUT: 1415 mL / NET: 1278.9 mL     @ 07: @ 10:49  --------------------------------------------------------  IN: 420.8 mL / OUT: 115 mL / NET: 305.8 mL  PHYSICAL EXAM:Daily   Elderly male S/P tracheostomy   on full ventilatory support /40%20/5cm PEEP  , alternating with trach. cholar on IV heparin   Daily Weight in k.4 (2021 00:00)  HEENT:     + NCAT  + EOMI  - Conjuctival edema   - Icterus   - Thrush   - ETT  + NGT/OGT  Neck:         + FROM   RT SC line  JVD     - Nodes     - Masses    + Mid-line trachea + Tracheostomy  Chest: normal findings  Lungs:          + CTA   + Rhonchi    - Rales    - Wheezing + Decreased  LT BS   - Dullness R L  Cardiac:       + S1 + S2    + RRR   - Irregular   - S3  - S4    - Murmurs   - Rub   - Hamman’s sign   Abdomen:    + BS     + Soft    + Non-tender     - Distended    - Organomegaly  - PEG  Extremities:   - Cyanosis U/L   - Clubbing  U/L  + LE/UE Edema   + Capillary refill    + Pulses   Neuro:        - Awake   -  Alert   - Confused   - Lethargic   + Sedated  + Generalized Weakness  Skin:        - Rashes    - Erythema   + Normal incisions   + IV sites intact          HOSPITAL MEDICATIONS: All mediciations reviewed and analyzed  MEDICATIONS  (STANDING):  aMIOdarone    Tablet 200 milliGRAM(s) Oral daily  chlorhexidine 0.12% Liquid 15 milliLiter(s) Oral Mucosa every 12 hours  chlorhexidine 2% Cloths 1 Application(s) Topical <User Schedule>  dexMEDEtomidine Infusion 0.5 MICROgram(s)/kG/Hr (9.81 mL/Hr) IV Continuous <Continuous>  dextrose 50% Injectable 50 milliLiter(s) IV Push every 15 minutes  heparin  Infusion 400 Unit(s)/Hr (12.5 mL/Hr) IV Continuous <Continuous>  HYDROmorphone  Injectable 0.5 milliGRAM(s) IV Push once  insulin lispro (ADMELOG) corrective regimen sliding scale   SubCutaneous every 6 hours  pantoprazole  Injectable 40 milliGRAM(s) IV Push every 12 hours  piperacillin/tazobactam IVPB.. 3.375 Gram(s) IV Intermittent every 8 hours  propofol Infusion 20 MICROgram(s)/kG/Min (9.42 mL/Hr) IV Continuous <Continuous>  sodium chloride 0.9% lock flush 3 milliLiter(s) IV Push every 8 hours  sodium chloride 0.9%. 1000 milliLiter(s) (10 mL/Hr) IV Continuous <Continuous>    MEDICATIONS  (PRN):  acetaminophen    Suspension .. 650 milliGRAM(s) Oral every 6 hours PRN Temp greater or equal to 38C (100.4F)    LABS: All Lab data reviewed and analyzed                                   10.5   9.33  )-----------( 256      ( 2021 01:00 )             34.0    07-20    132<L>  |  95<L>  |  20  ----------------------------<  136<H>  4.6   |  27  |  0.84    Ca    10.6<H>      2021 01:00  Phos  3.8     07-20  Mg     2.0     07-20    TPro  9.5<H>  /  Alb  4.7  /  TBili  0.3  /  DBili  x   /  AST  26  /  ALT  22  /  AlkPhos  108  07-20      Ca    10.6<H>      2021 00:28  Phos  4.0     07-19  Mg     2.2     07-19    TPro  9.8<H>  /  Alb  4.9  /  TBili  0.3  /  DBili  x   /  AST  28  /  ALT  22  /  AlkPhos  114  07-19    Ca    10.7<H>      2021 01:04  Phos  3.5     07-18  Mg     2.0     07-18    TPro  9.5<H>  /  Alb  4.4  /  TBili  0.3  /  DBili  x   /  AST  26  /  ALT  23  /  AlkPhos  108  07-18    Ca    10.2      2021 01:25  Phos  3.4     07-17  Mg     1.9     07-17    TPro  9.0<H>  /  Alb  4.5  /  TBili  0.3  /  DBili  x   /  AST  28  /  ALT  21  /  AlkPhos  95  07-17                                                     PTT - ( 2021 04:52 )  PTT:45.2 sec LIVER FUNCTIONS - ( 2021 00:42 )  Alb: 3.4 g/dL / Pro: 6.7 g/dL / ALK PHOS: 213 U/L / ALT: 15 U/L / AST: 24 U/L / GGT: x           RADIOLOGY: - Reviewed and analyzed LLL  pneumonia , LVAD HM2, CT scan of abdomen reviewed result noted

## 2021-07-21 NOTE — PROGRESS NOTE ADULT - SUBJECTIVE AND OBJECTIVE BOX
Patient seen and examined at bedside.    Overnight Events: No acute events overnight, doing well, now transferred to the floor.     Review Of Systems: No chest pain, shortness of breath, or palpitations            Current Meds:  acetaminophen    Suspension .. 650 milliGRAM(s) Oral every 6 hours PRN  aMIOdarone    Tablet 200 milliGRAM(s) Oral daily  clonazePAM  Tablet 0.5 milliGRAM(s) Oral at bedtime  hydrALAZINE 25 milliGRAM(s) Oral every 8 hours PRN  insulin lispro (ADMELOG) corrective regimen sliding scale   SubCutaneous every 6 hours  metoprolol tartrate 25 milliGRAM(s) Oral every 12 hours  pantoprazole   Suspension 40 milliGRAM(s) Enteral Tube daily  psyllium Powder 1 Packet(s) Oral daily  vancomycin    Solution 125 milliGRAM(s) Oral daily      Vitals:  T(F): 98.1 (07-21), Max: 98.4 (07-20)  HR: 105 (07-21) (82 - 116)  BP: 100/74 (07-21) (96/64 - 109/77)  RR: 18 (07-21)  SpO2: 100% (07-21)  I&O's Summary    20 Jul 2021 07:01  -  21 Jul 2021 07:00  --------------------------------------------------------  IN: 1440 mL / OUT: 530 mL / NET: 910 mL        Physical Exam:  Appearance: No acute distress; well appearing  Cardiovascular: RRR, S1, S2, no murmurs, rubs, or gallops; no edema; no JVD  Respiratory: Clear to auscultation bilaterally  Gastrointestinal: soft, non-tender, non-distended with normal bowel sounds  Musculoskeletal: No clubbing; no joint deformity   Neurologic: Non-focal  Psychiatry: AAOx3, mood & affect appropriate  Skin: No rashes, ecchymoses, or cyanosis                          9.9    10.80 )-----------( 265      ( 21 Jul 2021 06:43 )             32.0     07-21    133<L>  |  95<L>  |  27<H>  ----------------------------<  114<H>  4.6   |  26  |  0.79    Ca    10.5      21 Jul 2021 06:44  Phos  3.8     07-21  Mg     2.1     07-21    TPro  9.3<H>  /  Alb  4.6  /  TBili  0.2  /  DBili  x   /  AST  20  /  ALT  19  /  AlkPhos  100  07-21    PT/INR - ( 20 Jul 2021 13:44 )   PT: 26.2 sec;   INR: 2.27 ratio

## 2021-07-21 NOTE — PROGRESS NOTE ADULT - ASSESSMENT
66M with a history of stage D NICM s/p HM2 on 2017 as DT (due to severe PAD) with TV ring, prior COVID-19 infection 2020, recurrent syncope post LVAD s/p ILR, and chronic abdominal pain with prior negative workup  Admitted 21with symptomatic anemia with Hgb 4.5 in setting of INR 8.8 without hemodynamic instability. He was transfused and underwent VCE which showed active bleeding in the mid small bowel but subsequent enteroscopy 6/15 did not reveal any active bleeding.   He acutely decompensated after procedure with fever/hypertension, low flow alarms, and pulmonary infiltrate with hypoxia requiring intubation from probable aspiration PNA.   Course notable for inability to wean ventilator with persistent secretions for which he underwent tracheostomy    acute c diff colitis.     Antibiotics  IV Vanco 6/15,  -->  Zosyn 6/15 -->   PO Vanco 500 q 6  -->   PO Vanco 125 q 6h   IV Flagyl -->   Cefepime -->29  Cefazolin --7    Positive Cdiff testin21    Clostridiodes difficile :  Abdomen not  distended- but soft-  diarrhea resolved  WBC within the normal range  Continue po Vanco taper  appears improved    Continue po Vanco taper:  BID  -->   Once daily:  -->   Once every other day:  -->   Once every third day: 8/3-->     discussed with NP

## 2021-07-21 NOTE — PROGRESS NOTE ADULT - SUBJECTIVE AND OBJECTIVE BOX
VITAL SIGNS    Telemetry:  Nsr 80    Vital Signs Last 24 Hrs  T(C): 36.7 (07-21-21 @ 07:00), Max: 37 (07-20-21 @ 12:00)  T(F): 98.1 (07-21-21 @ 07:00), Max: 98.6 (07-20-21 @ 12:00)  HR: 90 (07-21-21 @ 08:00) (82 - 115)  BP: 99/79 (07-21-21 @ 07:00) (95/72 - 109/77)  RR: 17 (07-21-21 @ 07:00) (16 - 26)  SpO2: 100% (07-21-21 @ 08:00) (98% - 100%)                   07-20 @ 07:01  -  07-21 @ 07:00  --------------------------------------------------------  IN: 1440 mL / OUT: 530 mL / NET: 910 mL          Daily     Daily             CAPILLARY BLOOD GLUCOSE      POCT Blood Glucose.: 138 mg/dL (20 Jul 2021 18:07)  POCT Blood Glucose.: 131 mg/dL (20 Jul 2021 12:15)                    Coumadin    [x ] YES          [  ]      NO                                   PHYSICAL EXAM        Neurology: alert and oriented x 3, nonfocal, no gross deficits  CV : s1 s2 RRR, +humm  Trach, + secretions  Sternal Wound :  CDI , Stable  Lungs: cta  Abdomen: soft, nontender, nondistended, positive bowel sounds, last bowel movement                         :    voiding       Extremities:    trace   edema   /  -   calve tenderness          acetaminophen    Suspension .. 650 milliGRAM(s) Oral every 6 hours PRN  aMIOdarone    Tablet 200 milliGRAM(s) Oral daily  clonazePAM  Tablet 0.5 milliGRAM(s) Oral at bedtime  hydrALAZINE 25 milliGRAM(s) Oral every 8 hours PRN  insulin lispro (ADMELOG) corrective regimen sliding scale   SubCutaneous every 6 hours  metoprolol tartrate 25 milliGRAM(s) Oral every 12 hours  pantoprazole   Suspension 40 milliGRAM(s) Enteral Tube daily  psyllium Powder 1 Packet(s) Oral daily  vancomycin    Solution 125 milliGRAM(s) Oral daily                    Physical Therapy Rec:   Home  [  ]   Home w/ PT  [  ]  Rehab  [  ]  Discussed with Cardiothoracic Team at AM rounds.

## 2021-07-21 NOTE — PROGRESS NOTE ADULT - SUBJECTIVE AND OBJECTIVE BOX
Follow Up:  cidff    Interval History/ROS:  ambulated today, no abd pain,   RN notes only 2 soft stools, no diarrhea    Allergies  No Known Allergies    ANTIMICROBIALS:  vancomycin    Solution 125 daily      OTHER MEDS:  MEDICATIONS  (STANDING):  acetaminophen    Suspension .. 650 every 6 hours PRN  aMIOdarone    Tablet 200 daily  clonazePAM  Tablet 0.5 at bedtime  hydrALAZINE 25 every 8 hours PRN  insulin lispro (ADMELOG) corrective regimen sliding scale  every 6 hours  metoprolol tartrate 25 every 12 hours  pantoprazole   Suspension 40 daily  psyllium Powder 1 daily  simethicone 80 every 8 hours PRN  warfarin 5 once      Vital Signs Last 24 Hrs  T(C): 36.7 (21 Jul 2021 11:32), Max: 36.9 (20 Jul 2021 23:17)  T(F): 98.1 (21 Jul 2021 11:32), Max: 98.4 (20 Jul 2021 23:17)  HR: 103 (21 Jul 2021 14:29) (82 - 116)  BP: 101/73 (21 Jul 2021 14:29) (98/78 - 109/76)  BP(mean): 84 (21 Jul 2021 14:29) (76 - 88)  RR: 18 (21 Jul 2021 11:32) (16 - 22)  SpO2: 100% (21 Jul 2021 14:29) (100% - 100%)    PHYSICAL EXAM:  General: thin  NAD, Non-toxic  Neurology: A&Ox3, nonfocal  Respiratory: trach Clear to auscultation bilaterally  CV: RRR, S1S2, no murmurs, rubs or gallops  Abdominal: Soft, Non-tender, non-distended,  Extremities: No edema,   Line Sites: Clear  Skin: No rash                          9.9    10.80 )-----------( 265      ( 21 Jul 2021 06:43 )             32.0       07-21    133<L>  |  95<L>  |  27<H>  ----------------------------<  114<H>  4.6   |  26  |  0.79    Ca    10.5      21 Jul 2021 06:44  Phos  3.8     07-21  Mg     2.1     07-21    TPro  9.3<H>  /  Alb  4.6  /  TBili  0.2  /  DBili  x   /  AST  20  /  ALT  19  /  AlkPhos  100  07-21          MICROBIOLOGY:  .Blood Blood  07-14-21   No Growth Final  --  --      .Blood Blood-Venous  07-09-21   No Growth Final  --  --      .Sputum Sputum  07-07-21   Moderate Citrobacter freundii  Moderate Serratia marcescens  Normal Respiratory Bria present  --  Citrobacter freundii  Serratia marcescens      .Sputum Sputum  06-30-21   Normal Respiratory Bria present  --    Few Squamous epithelial cells per low power field  Moderate polymorphonuclear leukocytes per low power field  No organisms seen      .Blood Blood-Peripheral  06-29-21   No Growth Final  --  --      .Blood Blood-Peripheral  06-26-21   No Growth Final  --  Staphylococcus lugdunensis      .Blood Blood  06-23-21   No Growth Final  --  --      .Sputum Sputum trap  06-23-21   Numerous Citrobacter freundii  Moderate Klebsiella pneumoniae  Normal Respiratory Bria present  --  Citrobacter freundii  Klebsiella pneumoniae          v          RADIOLOGY:    Vasyl Montana MD; Division of Infectious Disease; Pager: [phone]; nights and weekends: [phone]

## 2021-07-21 NOTE — PROGRESS NOTE ADULT - ASSESSMENT
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 coli UTIs, driveline drainage (1/7/21) and COVID-19 (back in April 2020)  He was recently seen in clinic where he complained of abdominal pain and dark stools w constipation back in May. He presents to [facility] ER today weakness and fatigue, moderate and + Black stools for three days, on coumadin secondary to warfarin use in the setting of an LVAD. Patient has required transfusions for GIB in the past. Mostly recently back in jan 2021 pt had anemia with dark stools. No interventions was done at that time. However Last Endoscopy was done in July 2020 (negative). Today labs show patient is anemic with H/H of 4.5/16.3,. INR is 8.84 MAP in the 90s, Temp 35.1. He denies any chest pain, shortness of breath, dizziness, abd pain, nausea or vomiting.      Surgery/Hospital Course:  6/14 admit for melena w/ anemia, INR 8.84   6/15 Capsul study (+) for small bowel bleed, balloon endoscopy (old blood in prox ileum); post EGD - septic w/ L opacity, re-intubated for concern for aspiration, TTE (Mod MR, decrease biV w/ interventricular septum boweing towards R)  6/17 bronch   6/20 +C Diff   6/22 TC   6/22 CT C/A/P: Fluid filled colon which may be 2/2 rapid transit. Small bilateral pleffs with associates. Compressive atelectasis New ISABELLE & LLL  parenchymal opacities, suspicious for pneumonia. 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   6/25 #8 Shiley trach at bedside   6/28 CPAP trials   7/1 LVAD speed increased to 9200  7/2 Bronch; Central line dc'ed  7/20 TC since 7/7, continue as tolerated. Patient transferred to SDU.   7/21 Cont PT, no trach downsize today(#8cuffed)  Anticoagulation  Continue TF, speech f/u  Vanco taper

## 2021-07-21 NOTE — PROGRESS NOTE ADULT - ATTENDING COMMENTS
No acute cardiac issues.  Abd is soft, non tender. Mildly distended.   Continue tracheostomy care.  Will discuss long term plan w CTS.

## 2021-07-22 NOTE — PROGRESS NOTE ADULT - ASSESSMENT
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 coli UTIs, driveline drainage (1/7/21) and COVID-19 (back in April 2020)  He was recently seen in clinic where he complained of abdominal pain and dark stools w constipation back in May. He presents to [facility] ER today weakness and fatigue, moderate and + Black stools for three days, on coumadin secondary to warfarin use in the setting of an LVAD. Patient has required transfusions for GIB in the past. Mostly recently back in jan 2021 pt had anemia with dark stools. No interventions was done at that time. However Last Endoscopy was done in July 2020 (negative). Today labs show patient is anemic with H/H of 4.5/16.3,. INR is 8.84 MAP in the 90s, Temp 35.1. He denies any chest pain, shortness of breath, dizziness, abd pain, nausea or vomiting.      Surgery/Hospital Course:  6/14 admit for melena w/ anemia, INR 8.84   6/15 Capsul study (+) for small bowel bleed, balloon endoscopy (old blood in prox ileum); post EGD - septic w/ L opacity, re-intubated for concern for aspiration, TTE (Mod MR, decrease biV w/ interventricular septum boweing towards R)  6/17 bronch   6/20 +C Diff   6/22 TC   6/22 CT C/A/P: Fluid filled colon which may be 2/2 rapid transit. Small bilateral pleffs with associates. Compressive atelectasis New ISABELLE & LLL  parenchymal opacities, suspicious for pneumonia. 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   6/25 #8 Shiley trach at bedside   6/28 CPAP trials   7/1 LVAD speed increased to 9200  7/2 Bronch; Central line dc'ed  7/20 TC since 7/7, continue as tolerated. Patient transferred to SDU.   7/21 Cont PT, no trach downsize today(#8cuffed)  Anticoagulation  Continue TF, speech f/u  Vanco taper  7/22  oob to chair  INR  2.47  5 mg coumadin      65M PMH ACC/AHA stage D HF due to NICM HM2 LVAD , TV annuloplasty ring 9/12/17 as destination therapy due to severe peripheral artery disease with significant stenosis  SIADH, Depression, CKD-3 with hyperkalemia, past E. coli UTIs, driveline drainage (1/7/21) and COVID-19 (back in April 2020)  He was recently seen in clinic where he complained of abdominal pain and dark stools w constipation back in May. He presents to [facility] ER today weakness and fatigue, moderate and + Black stools for three days, on coumadin secondary to warfarin use in the setting of an LVAD. Patient has required transfusions for GIB in the past. Mostly recently back in jan 2021 pt had anemia with dark stools. No interventions was done at that time. However Last Endoscopy was done in July 2020 (negative). Today labs show patient is anemic with H/H of 4.5/16.3,. INR is 8.84 MAP in the 90s, Temp 35.1. He denies any chest pain, shortness of breath, dizziness, abd pain, nausea or vomiting.      Surgery/Hospital Course:  6/14 admit for melena w/ anemia, INR 8.84   6/15 Capsul study (+) for small bowel bleed, balloon endoscopy (old blood in prox ileum); post EGD - septic w/ L opacity, re-intubated for concern for aspiration, TTE (Mod MR, decrease biV w/ interventricular septum boweing towards R)  6/17 bronch   6/20 +C Diff   6/22 TC   6/22 CT C/A/P: Fluid filled colon which may be 2/2 rapid transit. Small bilateral pleffs with associates. Compressive atelectasis New ISABELLE & LLL  parenchymal opacities, suspicious for pneumonia. 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   6/25 #8 Shiley trach at bedside   6/28 CPAP trials   7/1 LVAD speed increased to 9200  7/2 Bronch; Central line dc'ed  7/20 TC since 7/7, continue as tolerated. Patient transferred to SDU.   7/21 Cont PT, no trach downsize today(#8cuffed)  Anticoagulation  Continue TF, speech f/u  Vanco taper  7/22  oob to chair  INR  2.47  5 mg coumadin     increased lop to 25 q8  per HF

## 2021-07-22 NOTE — PROGRESS NOTE ADULT - ASSESSMENT
66M with a history of stage D NICM s/p HM2 on 2017 as DT (due to severe PAD) with TV ring, prior COVID-19 infection 2020, recurrent syncope post LVAD s/p ILR, and chronic abdominal pain with prior negative workup  Admitted 21with symptomatic anemia with Hgb 4.5 in setting of INR 8.8 without hemodynamic instability. He was transfused and underwent VCE which showed active bleeding in the mid small bowel but subsequent enteroscopy 6/15 did not reveal any active bleeding.   He acutely decompensated after procedure with fever/hypertension, low flow alarms, and pulmonary infiltrate with hypoxia requiring intubation from probable aspiration PNA.   Course notable for inability to wean ventilator with persistent secretions for which he underwent tracheostomy    acute c diff colitis.     Antibiotics  IV Vanco 6/15,  -->  Zosyn 6/15 -->   PO Vanco 500 q 6  -->   PO Vanco 125 q 6h   IV Flagyl -->   Cefepime -->29  Cefazolin --7    Positive Cdiff testin21    Clostridiodes difficile :  Abdomen not  distended- but soft-  diarrhea resolved  WBC now modestly increased  Continue po Vanco taper  appears improved    Trend WBC  Continue po Vanco taper:  BID  -->   Once daily:  -->   Once every other day:  -->   Once every third day: 8/3-->

## 2021-07-22 NOTE — PROGRESS NOTE ADULT - SUBJECTIVE AND OBJECTIVE BOX
Kiana Wheat MD  Cardiology Fellow  [phone]  All Cardiology service information can be found 24/7 on [email or web address], password: [email or web address]    Patient seen and examined at bedside.    Overnight Events: Patient doing well, tachycardic.    Review Of Systems: No chest pain, shortness of breath, or palpitations            Current Meds:  acetaminophen    Suspension .. 650 milliGRAM(s) Oral every 6 hours PRN  aMIOdarone    Tablet 200 milliGRAM(s) Oral daily  clonazePAM  Tablet 0.5 milliGRAM(s) Oral at bedtime  hydrALAZINE 25 milliGRAM(s) Oral every 8 hours PRN  insulin lispro (ADMELOG) corrective regimen sliding scale   SubCutaneous every 6 hours  metoprolol tartrate 25 milliGRAM(s) Oral three times a day  pantoprazole   Suspension 40 milliGRAM(s) Enteral Tube daily  psyllium Powder 1 Packet(s) Oral daily  simethicone 80 milliGRAM(s) Chew every 8 hours PRN  vancomycin    Solution 125 milliGRAM(s) Oral daily  warfarin 5 milliGRAM(s) Oral once      Vitals:  T(F): 98.9 (07-22), Max: 98.9 (07-22)  HR: 107 (07-22) (93 - 107)  BP: 100/66 (07-22) (80/57 - 101/73)  RR: 20 (07-22)  SpO2: 99% (07-22)  I&O's Summary    21 Jul 2021 07:01  -  22 Jul 2021 07:00  --------------------------------------------------------  IN: 1200 mL / OUT: 585 mL / NET: 615 mL    22 Jul 2021 07:01  -  22 Jul 2021 14:13  --------------------------------------------------------  IN: 240 mL / OUT: 400 mL / NET: -160 mL        Physical Exam:  Appearance: No acute distress; well appearing  Eyes: PERRL, EOMI, pink conjunctiva  HEENT: Normal oral mucosa  Cardiovascular: RRR, S1, S2, no murmurs, rubs, or gallops; no edema; no JVD  Respiratory: Clear to auscultation bilaterally  Gastrointestinal: soft, non-tender, non-distended with normal bowel sounds  Musculoskeletal: No clubbing; no joint deformity   Neurologic: Non-focal  Lymphatic: No lymphadenopathy  Psychiatry: AAOx3, mood & affect appropriate  Skin: No rashes, ecchymoses, or cyanosis                          8.3    11.15 )-----------( 281      ( 22 Jul 2021 06:47 )             26.6     07-22    132<L>  |  95<L>  |  37<H>  ----------------------------<  129<H>  4.5   |  25  |  0.79    Ca    9.9      22 Jul 2021 06:47  Phos  3.8     07-22  Mg     2.1     07-22    TPro  8.5<H>  /  Alb  4.0  /  TBili  0.2  /  DBili  x   /  AST  20  /  ALT  18  /  AlkPhos  96  07-22    PT/INR - ( 22 Jul 2021 06:47 )   PT: 28.4 sec;   INR: 2.47 ratio             Interpretation of Telemetry: Sinus tach 100-120

## 2021-07-22 NOTE — PROGRESS NOTE ADULT - PROBLEM SELECTOR PLAN 3
-s/p trach 6/25 Ricky #8    plan for downsize trach today  -continue to wean trach as tolerates  - goal to decannulate

## 2021-07-22 NOTE — PROGRESS NOTE ADULT - PROBLEM SELECTOR PLAN 1
- Increase metoprolol tartrate to 25mg TID given sinus tach  - hold diuretics in the setting of fluid losses   - Continue home amiodarone 200 mg PO daily

## 2021-07-22 NOTE — PROGRESS NOTE ADULT - PROBLEM SELECTOR PLAN 1
-Continue metoprolol tartrate 25 bid  - diuretics held in the setting of fluid losses   - Continue home amiodarone 200 mg PO daily -Continue metoprolol tartrate 25 q8  - diuretics held in the setting of fluid losses   - Continue home amiodarone 200 mg PO daily

## 2021-07-22 NOTE — PROGRESS NOTE ADULT - SUBJECTIVE AND OBJECTIVE BOX
RICKY JOINT  MRN#: [id number]  Subjective:  Pulmonary progress  : recurrent Acute hypoxic respiratory Failure ,aspiration pneumonia, NICM  , chart reviewed and H/O obtained radiological and Laboratory study reviewed patient Examined     65M PMH ACC/AHA stage D HF due to NICM HM2 LVAD , TV annuloplasty ring 17 as destination therapy due to severe peripheral artery disease with significant stenosis  SIADH, Depression, CKD-3 with hyperkalemia, past E. coli UTIs, driveline drainage (21) and COVID-19 (back in 2020)  He was recently seen in clinic where he complained of abdominal pain and dark stools w constipation back in May. He presents to [facility] ER today weakness and fatigue, moderate and + Black stools for three days, on coumadin secondary to warfarin use in the setting of an LVAD. Patient has required transfusions for GIB in the past. Mostly recently back in 2021 pt had anemia with dark stools. No interventions was done at that time. However Last Endoscopy was done in 2020 (negative). Today labs show patient is anemic with H/H of 4.5/16.3,. INR is 8.84 MAP in the 90s, Temp 35.1. He denies any chest pain, shortness of breath, dizziness, abd pain, nausea or vomiting. found to have  rectal bleeding underwent endoscopy ,old blood in the proximal ileum ,  develop sepsis with LL opacity given Antibiotics , Extubated , reintubated , Bronchoscopy on Zosyn for LL pneumonia  and Amiodarone S/P TV Annuloplasty , patient remain intubated on full ventilatory support .S/P multiple units of blood transfusion , remain on full ventilatory support on Precedex and propofol , new central IJ line , diarrhea C diff. +ve on po vancomycin and IV Flagyl,  mildly distended belly , fever start on cefepime 2gm q 8 hrs S/P tracheostomy .  new RT Subclavian central line continue on contact  isolation ,still diarrhea on C-diff antibiotics ENT follow up appreciated , trial of C-PAP as tolerated , , copious secretion from trach. site chest x ray left lower lobe pneumonia , tolerating trch. collar 50% FI02 still excessive secretion need pulmonary toilet , off Ancef antibiotic , no more diarrhea back on full support mechanical ventilator , chest x ray show improvement in LLL air space disease, more awake and responsive on tube feeding no more diarrhea , S/P  Ancef for bacteremia no fever ,ID follow up noted ,  no nausea or vomiting or diarrhea still very weak and tired , event note pulled NG tube now replaced , back on tube feeding ,still on po vancomycin , getting PT and OT at bed side , no plan for decannulation for now. , no more diarrhea receiving PT and OPT at bed side , minimal secretion from tracheostomy site , no SOB getting stronger , improve muscle tone patient transfer to monitor bed still on contact isolation for C-Difficel colitis on 50% FI02, NG tube clogged , and change to resume tube feeding still loose stool .          (2021 16:57)    PAST MEDICAL & SURGICAL HISTORY:  CHF (congestive heart failure)    CAD (coronary artery disease)  Depression    Pleural effusion    History of 2019 novel coronavirus disease (COVID-19)  2020    Hemorrhoids    Bleeding hemorrhoids    Peripheral arterial disease    Claudication    BPH with urinary obstruction    ACC/AHA stage D systolic heart failure  Anticoagulation goal of INR 2.0 to 2.5    Falls    Clavicle fracture    CKD (chronic kidney disease), stage III    Iron deficiency anemia    H/O epistaxis    Vertigo    GI bleed    S/P TVR (tricuspid valve repair)    S/P ventricular assist device    S/P endoscopy    OBJECTIVE:  ICU Vital Signs Last 24 Hrs  T(C): 38.2 (2021 10:00), Max: 38.5 (2021 12:00)  T(F): 100.8 (2021 10:00), Max: 101.3 (2021 12:00)  HR: 65 (2021 10:00) (61 - 69)  BP: --  BP(mean): --  ABP: 105/67 (2021 10:00) (90/54 - 113/64)  ABP(mean): 77 (2021 10:00) (63 - 77)  RR: 20 (2021 10:00) (19 - 35)  SpO2: 99% (2021 10:00) (96% - 100%)       @ 07:01  -   @ 07:00  --------------------------------------------------------  IN: 2693.9 mL / OUT: 1415 mL / NET: 1278.9 mL     @ 07: @ 10:49  --------------------------------------------------------  IN: 420.8 mL / OUT: 115 mL / NET: 305.8 mL  PHYSICAL EXAM:Daily   Elderly male S/P tracheostomy   on full ventilatory support /40%20/5cm PEEP  , alternating with trach. collar on IV heparin   Daily Weight in k.4 (2021 00:00)  HEENT:     + NCAT  + EOMI  - Conjuctival edema   - Icterus   - Thrush   - ETT  + NGT/OGT  Neck:         + FROM   RT SC line  JVD     - Nodes     - Masses    + Mid-line trachea + Tracheostomy  Chest: normal findings  Lungs:          + CTA   + Rhonchi    - Rales    - Wheezing + Decreased  LT BS   - Dullness R L  Cardiac:       + S1 + S2    + RRR   - Irregular   - S3  - S4    - Murmurs   - Rub   - Hamman’s sign   Abdomen:    + BS     + Soft    + Non-tender     - Distended    - Organomegaly  - PEG  Extremities:   - Cyanosis U/L   - Clubbing  U/L  + LE/UE Edema   + Capillary refill    + Pulses   Neuro:        - Awake   -  Alert   - Confused   - Lethargic   + Sedated  + Generalized Weakness  Skin:        - Rashes    - Erythema   + Normal incisions   + IV sites intact          HOSPITAL MEDICATIONS: All mediciations reviewed and analyzed  MEDICATIONS  (STANDING):  aMIOdarone    Tablet 200 milliGRAM(s) Oral daily  chlorhexidine 0.12% Liquid 15 milliLiter(s) Oral Mucosa every 12 hours  chlorhexidine 2% Cloths 1 Application(s) Topical <User Schedule>  dexMEDEtomidine Infusion 0.5 MICROgram(s)/kG/Hr (9.81 mL/Hr) IV Continuous <Continuous>  dextrose 50% Injectable 50 milliLiter(s) IV Push every 15 minutes  heparin  Infusion 400 Unit(s)/Hr (12.5 mL/Hr) IV Continuous <Continuous>  HYDROmorphone  Injectable 0.5 milliGRAM(s) IV Push once  insulin lispro (ADMELOG) corrective regimen sliding scale   SubCutaneous every 6 hours  pantoprazole  Injectable 40 milliGRAM(s) IV Push every 12 hours  piperacillin/tazobactam IVPB.. 3.375 Gram(s) IV Intermittent every 8 hours  propofol Infusion 20 MICROgram(s)/kG/Min (9.42 mL/Hr) IV Continuous <Continuous>  sodium chloride 0.9% lock flush 3 milliLiter(s) IV Push every 8 hours  sodium chloride 0.9%. 1000 milliLiter(s) (10 mL/Hr) IV Continuous <Continuous>    MEDICATIONS  (PRN):  acetaminophen    Suspension .. 650 milliGRAM(s) Oral every 6 hours PRN Temp greater or equal to 38C (100.4F)    LABS: All Lab data reviewed and analyzed                         8.3    11.15 )-----------( 281      ( 2021 06:47 )             26.6    07-22    132<L>  |  95<L>  |  37<H>  ----------------------------<  129<H>  4.5   |  25  |  0.79    Ca    9.9      2021 06:47  Phos  3.8     07-22  Mg     2.1     07-22    TPro  8.5<H>  /  Alb  4.0  /  TBili  0.2  /  DBili  x   /  AST  20  /  ALT  18  /  AlkPhos  96  07-22    Ca    10.6<H>      2021 01:00  Phos  3.8     07-20  Mg     2.0     07-20    TPro  9.5<H>  /  Alb  4.7  /  TBili  0.3  /  DBili  x   /  AST  26  /  ALT  22  /  AlkPhos  108  07-20      Ca    10.6<H>      2021 00:28  Phos  4.0     07-19  Mg     2.2     07-19    TPro  9.8<H>  /  Alb  4.9  /  TBili  0.3  /  DBili  x   /  AST  28  /  ALT  22  /  AlkPhos  114  07-19    Ca    10.7<H>      2021 01:04  Phos  3.5     07-18  Mg     2.0     07-18    TPro  9.5<H>  /  Alb  4.4  /  TBili  0.3  /  DBili  x   /  AST  26  /  ALT  23  /  AlkPhos  108  07-18    Ca    10.2      2021 01:25  Phos  3.4     07-17  Mg     1.9     07-17    TPro  9.0<H>  /  Alb  4.5  /  TBili  0.3  /  DBili  x   /  AST  28  /  ALT  21  /  AlkPhos  95  07-17                                                     PTT - ( 2021 04:52 )  PTT:45.2 sec LIVER FUNCTIONS - ( 2021 00:42 )  Alb: 3.4 g/dL / Pro: 6.7 g/dL / ALK PHOS: 213 U/L / ALT: 15 U/L / AST: 24 U/L / GGT: x           RADIOLOGY: - Reviewed and analyzed LLL  pneumonia , LVAD HM2, CT scan of abdomen reviewed result noted

## 2021-07-22 NOTE — PROGRESS NOTE ADULT - SUBJECTIVE AND OBJECTIVE BOX
Follow Up:  Cdiff    Interval History/ROS: no bm whcn seen earlier today -   taking po,  fatigued    Allergies  No Known Allergies    ANTIMICROBIALS:  vancomycin    Solution 125 daily    OTHER MEDS:  MEDICATIONS  (STANDING):  acetaminophen    Suspension .. 650 every 6 hours PRN  aMIOdarone    Tablet 200 daily  clonazePAM  Tablet 0.5 at bedtime  hydrALAZINE 25 every 8 hours PRN  insulin lispro (ADMELOG) corrective regimen sliding scale  every 6 hours  metoprolol tartrate 25 three times a day  pantoprazole   Suspension 40 daily  psyllium Powder 1 daily  simethicone 80 every 8 hours PRN  warfarin 5 once      Vital Signs Last 24 Hrs  T(C): 37.2 (22 Jul 2021 11:00), Max: 37.2 (22 Jul 2021 11:00)  T(F): 98.9 (22 Jul 2021 11:00), Max: 98.9 (22 Jul 2021 11:00)  HR: 107 (22 Jul 2021 11:00) (93 - 107)  BP: 100/66 (22 Jul 2021 11:00) (80/57 - 101/73)  BP(mean): 79 (22 Jul 2021 11:00) (0 - 84)  RR: 20 (22 Jul 2021 11:00) (16 - 20)  SpO2: 99% (22 Jul 2021 11:00) (93% - 100%)    PHYSICAL EXAM:  General: WN/WD NAD, Non-toxic  Neurology: A&Ox3, nonfocal  Respiratory: Clear to auscultation bilaterally  CV: RRR, S1S2, no murmurs, rubs or gallops  Abdominal: Soft, Non-tender, non-distended  Extremities: No edema  Line Sites: Clear  Skin: No rash                        8.3    11.15 )-----------( 281      ( 22 Jul 2021 06:47 )             26.6   WBC Count: 11.15 (07-22 @ 06:47)  WBC Count: 10.80 (07-21 @ 06:43)  WBC Count: 9.33 (07-20 @ 01:00)  WBC Count: 7.93 (07-19 @ 00:28)  WBC Count: 6.51 (07-18 @ 01:04)    07-22    132<L>  |  95<L>  |  37<H>  ----------------------------<  129<H>  4.5   |  25  |  0.79    Ca    9.9      22 Jul 2021 06:47  Phos  3.8     07-22  Mg     2.1     07-22    TPro  8.5<H>  /  Alb  4.0  /  TBili  0.2  /  DBili  x   /  AST  20  /  ALT  18  /  AlkPhos  96  07-22        MICROBIOLOGY:  .Blood Blood  07-14-21   No Growth Final  --  --      .Blood Blood-Venous  07-09-21   No Growth Final  --  --      .Sputum Sputum  07-07-21   Moderate Citrobacter freundii  Moderate Serratia marcescens  Normal Respiratory Bria present  --  Citrobacter freundii  Serratia marcescens      .Sputum Sputum  06-30-21   Normal Respiratory Bria present  --    Few Squamous epithelial cells per low power field  Moderate polymorphonuclear leukocytes per low power field  No organisms seen      .Blood Blood-Peripheral  06-29-21   No Growth Final  --  --      .Blood Blood-Peripheral  06-26-21   No Growth Final  --  Staphylococcus lugdunensis      .Blood Blood  06-23-21   No Growth Final  --  --      .Sputum Sputum trap  06-23-21   Numerous Citrobacter freundii  Moderate Klebsiella pneumoniae  Normal Respiratory Bria present  --  Citrobacter freundii  Klebsiella pneumoniae      RADIOLOGY:    Vasyl Montana MD; Division of Infectious Disease; Pager: [phone]; nights and weekends: [phone]

## 2021-07-22 NOTE — PROGRESS NOTE ADULT - ATTENDING COMMENTS
Mildly tachycardic. Will increase betablockers.  No acute abdomnial issues. C. diff conitnues to resolve,  Downsizing trach.

## 2021-07-22 NOTE — PROGRESS NOTE ADULT - SUBJECTIVE AND OBJECTIVE BOX
VITAL SIGNS    Telemetry:     sr 100    Vital Signs Last 24 Hrs  T(C): 36.8 (07-21-21 @ 23:28), Max: 36.8 (07-21-21 @ 23:28)  T(F): 98.2 (07-21-21 @ 23:28), Max: 98.2 (07-21-21 @ 23:28)  HR: 101 (07-22-21 @ 05:48) (93 - 116)  BP: 94/67 (07-22-21 @ 03:00) (80/57 - 101/73)  RR: 18 (07-22-21 @ 05:48) (16 - 18)  SpO2: 99% (07-22-21 @ 05:48) (93% - 100%)                   Daily     Daily       Bilirubin Total, Serum: 0.2 mg/dL (07-22 @ 06:47)    CAPILLARY BLOOD GLUCOSE      POCT Blood Glucose.: 149 mg/dL (22 Jul 2021 08:02)  POCT Blood Glucose.: 145 mg/dL (22 Jul 2021 05:44)  POCT Blood Glucose.: 146 mg/dL (22 Jul 2021 00:53)  POCT Blood Glucose.: 158 mg/dL (21 Jul 2021 21:52)  POCT Blood Glucose.: 158 mg/dL (21 Jul 2021 16:56)  POCT Blood Glucose.: 159 mg/dL (21 Jul 2021 12:03)       Coumadin                        PHYSICAL EXAM  s   No chest pain  No SOB  Neurology: alert and oriented x 3, moves all extremities with no defecits  CV :  RRR  lvad sounds    Lungs:   CTA B/L  Abdomen: soft, nontender, nondistended, positive bowel sounds,  Extremities:     no edema   no calf tenderness

## 2021-07-23 NOTE — PROGRESS NOTE ADULT - PROBLEM SELECTOR PLAN 3
-No active bleeding seen on enteroscopy  -Repeat CBC this afternoon given acute worsening of anemia, check iron studies  -Would reconsult GI to evaluate for possible GI bleed, INR has not been supratherapeutic  -Continue PPI -No active bleeding seen on enteroscopy  -Repeat CBC this afternoon given acute worsening of anemia, check iron studies  -Check stool guaiac, would reconsult GI to evaluate for possible GI bleed, INR has not been supratherapeutic  -Continue PPI -No active bleeding seen on enteroscopy  -Repeat CBC this afternoon given acute worsening of anemia, check iron studies  -Check stool guaiac, would reconsult GI to evaluate for possible GI bleed, INR has not been supratherapeutic  -Continue PPI  - transfuse PRN

## 2021-07-23 NOTE — PROGRESS NOTE ADULT - ASSESSMENT
65 YO M with a history of stage D NICM s/p HM2 on 9/2017 as DT (due to severe PAD) with TV ring, prior COVID-19 infection 4/2020, recurrent syncope post LVAD s/p ILR, and chronic abdominal pain with prior negative workup who was admitted with symptomatic anemia with Hgb 4.5 in setting of INR 8.8 without hemodynamic instability. He was transfused and underwent VCE which showed active bleeding in the mid small bowel but subsequent enteroscopy 6/15 did not reveal any active bleeding. He acutely decompensated after procedure with fever/hypertension, low flow alarms, and pulmonary infiltrate with hypoxia requiring intubation from probable aspiration PNA. His LDH is normal and there are no signs of overt LVAD dysfunction on echo though signs of insufficiently unloaded ventricle for which his speed has been increased. Course notable for inability to wean ventilator with persistent secretions for which he underwent tracheostomy as well as acute c diff colitis. Worsening anemia over last few days with low flow alarm, concerning for recurrent GI bleed.    Primary goals at this time is to progress towards tracheostomy decannulation and continue physical therapy for severe deconditioning.

## 2021-07-23 NOTE — PROGRESS NOTE ADULT - ATTENDING COMMENTS
H/H dropped today.  HAd vagal episode while having a BM. PResented vomiting, diaphoresis and drowsiness associated to low flow alarms. Received NS bolus and 1 PRBC. Needs close monitoring H/H Q6-12h.   Will hold off AC.  GI reconsulted.

## 2021-07-23 NOTE — PROGRESS NOTE ADULT - SUBJECTIVE AND OBJECTIVE BOX
Follow Up:  cdiff    Interval History/ROS: fatigued - has GI bleed - was hypotensive, blood transfusion in progress. stool heme+ but he does not have watery diarrhea - 1 bowel movement yesterday, 2 day - soft but formed    Allergies  No Known Allergies    ANTIMICROBIALS:  vancomycin    Solution 125 daily      OTHER MEDS:  MEDICATIONS  (STANDING):  acetaminophen    Suspension .. 650 every 6 hours PRN  aMIOdarone    Tablet 200 daily  clonazePAM  Tablet 0.5 at bedtime  hydrALAZINE 25 every 8 hours PRN  insulin lispro (ADMELOG) corrective regimen sliding scale  every 6 hours  metoprolol tartrate 25 three times a day  pantoprazole  Injectable 40 two times a day  simethicone 80 every 8 hours PRN      Vital Signs Last 24 Hrs  T(C): 36.4 (23 Jul 2021 15:12), Max: 36.9 (22 Jul 2021 19:00)  T(F): 97.6 (23 Jul 2021 15:12), Max: 98.5 (22 Jul 2021 19:00)  HR: 101 (23 Jul 2021 15:52) (94 - 116)  BP: 96/68 (23 Jul 2021 15:12) (93/74 - 109/71)  BP(mean): 79 (23 Jul 2021 15:12) (0 - 87)  RR: 18 (23 Jul 2021 15:52) (16 - 22)  SpO2: 100% (23 Jul 2021 15:52) (97% - 100%)    PHYSICAL EXAM:  General: fatigued, ill appearing  NAD,  Neurology: lethargic  Respiratory: no tacypnea, no resp distress  CV: RRR, S1S2, no murmurs, rubs or gallops  Abdominal: Soft,  sl tender,  Extremities: No edema  Line Sites: Clear  Skin: No rash                        6.7    13.83 )-----------( 263      ( 23 Jul 2021 12:23 )             22.0   WBC Count: 13.83 (07-23 @ 12:23)  WBC Count: 13.60 (07-23 @ 06:04)  WBC Count: 11.15 (07-22 @ 06:47)  WBC Count: 10.80 (07-21 @ 06:43)  WBC Count: 9.33 (07-20 @ 01:00)  WBC Count: 7.93 (07-19 @ 00:28)      07-23    136  |  99  |  40<H>  ----------------------------<  142<H>  4.5   |  24  |  0.84    Ca    9.5      23 Jul 2021 06:04  Phos  3.8     07-22  Mg     2.1     07-22    TPro  8.5<H>  /  Alb  4.0  /  TBili  0.2  /  DBili  x   /  AST  20  /  ALT  18  /  AlkPhos  96  07-22      MICROBIOLOGY:  .Blood Blood  07-14-21   No Growth Final  --  --      .Blood Blood-Venous  07-09-21   No Growth Final  --  --      .Sputum Sputum  07-07-21   Moderate Citrobacter freundii  Moderate Serratia marcescens  Normal Respiratory Bria present  --  Citrobacter freundii  Serratia marcescens      .Sputum Sputum  06-30-21   Normal Respiratory Bria present  --    Few Squamous epithelial cells per low power field  Moderate polymorphonuclear leukocytes per low power field  No organisms seen      .Blood Blood-Peripheral  06-29-21   No Growth Final  --  --      .Blood Blood-Peripheral  06-26-21   No Growth Final  --  Staphylococcus lugdunensis      .Blood Blood  06-23-21   No Growth Final  --  --    RADIOLOGY:  < from: Xray Abdomen 1 View PORTABLE -Urgent (Xray Abdomen 1 View PORTABLE -Urgent .) (07.23.21 @ 11:52) >  IMPRESSION: Moderate retention of colonic stool. No localizing abdominal acuity is otherwise seen.    < end of copied text >      Vasyl Montana MD; Division of Infectious Disease; Pager: [phone]; nights and weekends: [phone]

## 2021-07-23 NOTE — CHART NOTE - NSCHARTNOTEFT_GEN_A_CORE
Patient is a 66y old  Male who presents with a chief complaint of Anemia, Supratherapeutic INR, Dark Stools     Notified by bedside RN patient had episode of vomiting/low flow alarm in bathroom while having a bowel movement.  NG tube was removed. Patient seen and examined in room, patient awake, lethargic, pale, diaphoretic with low flows readings on LVAD.      Vital Signs Last 24 Hrs    BP: 107/72  BP(mean): 85)  SpO2: 100%     Physical Exam:  General: Diaphoretic   Neurology: Lethargic,  no focal decifits, nonfocal, BLANDON x 4  Respiratory: +Trach 7 portex   CV: +LVAD Hum  Abdominal: +abdominal tenderness, Soft, non-distended   MSK: No edema, + peripheral pulses, FROM all 4 extremity      Labs:                          6.7    13.83 )-----------( 263      ( 23 Jul 2021 12:23 )             22.0     07-23    136  |  99  |  40<H>  ----------------------------<  142<H>  4.5   |  24  |  0.84    Ca    9.5      23 Jul 2021 06:04  Phos  3.8     07-22  Mg     2.1     07-22    TPro  8.5<H>  /  Alb  4.0  /  TBili  0.2  /  DBili  x   /  AST  20  /  ALT  18  /  AlkPhos  96  07-22      Assessment & Plan:  65M PMH ACC/AHA stage D HF due to NICM HM2 LVAD , TV annuloplasty ring 9/12/17 as destination therapy due to severe peripheral artery disease with significant stenosis  SIADH, Depression, CKD-3 with hyperkalemia, past E. coli UTIs, driveline drainage (1/7/21) and COVID-19 (back in April 2020)  He was recently seen in clinic where he complained of abdominal pain and dark stools w constipation back in May. He presents to [facility] ER today weakness and fatigue, moderate and + Black stools for three days, on coumadin secondary to warfarin use in the setting of an LVAD. Patient has required transfusions for GIB in the past. Mostly recently back in jan 2021 pt had anemia with dark stools. No interventions was done at that time. However Last Endoscopy was done in July 2020 (negative).            Patient now presents with episode of vomiting/low lvad flow alarms while on toilet having a bowel movement. NG tube was removed. Patient seen and examined in room, patient awake, lethargic, pale, diaphoretic with low flows readings on LVAD.  Placed into recliner then transported back to bed.  NS bolus given, patient to receive 1unit PRBC for H&H of 6/22.        PLAN:  1 units PRBC Stat  Repeat CBC after transfusion  STAT Chest Xray  GI Consult placed

## 2021-07-23 NOTE — PROGRESS NOTE ADULT - PROBLEM SELECTOR PLAN 1
-Continue metoprolol tartrate 25 q8  - diuretics held in the setting of fluid losses   - Continue home amiodarone 200 mg PO daily

## 2021-07-23 NOTE — PROGRESS NOTE ADULT - ASSESSMENT
66M with a history of stage D NICM s/p HM2 on 2017 as DT (due to severe PAD) with TV ring, prior COVID-19 infection 2020, recurrent syncope post LVAD s/p ILR, and chronic abdominal pain with prior negative workup  Admitted 21with symptomatic anemia with Hgb 4.5 in setting of INR 8.8 without hemodynamic instability. He was transfused and underwent VCE which showed active bleeding in the mid small bowel but subsequent enteroscopy 6/15 did not reveal any active bleeding.   He acutely decompensated after procedure with fever/hypertension, low flow alarms, and pulmonary infiltrate with hypoxia requiring intubation from probable aspiration PNA.   Course notable for inability to wean ventilator with persistent secretions for which he underwent tracheostomy    acute c diff colitis.     Antibiotics  IV Vanco 6/15,  -->  Zosyn 6/15 -->   PO Vanco 500 q 6  -->   PO Vanco 125 q 6h   IV Flagyl -->   Cefepime -->29  Cefazolin --7    Positive Cdiff testin21    Clostridiodes difficile :  Abdomen not  distended- but soft-  diarrhea resolved  WBC now modestly increased  Continue po Vanco taper    GI bleed   blood transfusion in progress    Continue po Vanco taper:  BID  -->   Once daily:  -->   Once every other day:  -->   Once every third day: 8/3-->     discussed with NP  please call ID if needed this weekend

## 2021-07-23 NOTE — CONSULT NOTE ADULT - SUBJECTIVE AND OBJECTIVE BOX
Patient is a 66y old  Male who presents with a chief complaint of Anemia, Supratherapeutic INR, Dark Stools (23 Jul 2021 12:18)      Interval Events:   GI called back for continued drop in hemoglobin.  Patient is trach/PEG and nonverbal during my interview.  Unable to provide ROS.    Hospital Medications:  acetaminophen    Suspension .. 650 milliGRAM(s) Oral every 6 hours PRN  aMIOdarone    Tablet 200 milliGRAM(s) Oral daily  clonazePAM  Tablet 0.5 milliGRAM(s) Oral at bedtime  hydrALAZINE 25 milliGRAM(s) Oral every 8 hours PRN  insulin lispro (ADMELOG) corrective regimen sliding scale   SubCutaneous every 6 hours  metoprolol tartrate 25 milliGRAM(s) Oral three times a day  pantoprazole  Injectable 40 milliGRAM(s) IV Push two times a day  simethicone 80 milliGRAM(s) Chew every 8 hours PRN  vancomycin    Solution 125 milliGRAM(s) Oral daily    ROS:   Unable to provide.    PHYSICAL EXAM:   Vital Signs:  Vital Signs Last 24 Hrs  T(C): 36.4 (23 Jul 2021 15:12), Max: 36.9 (22 Jul 2021 19:00)  T(F): 97.6 (23 Jul 2021 15:12), Max: 98.5 (22 Jul 2021 19:00)  HR: 101 (23 Jul 2021 15:52) (94 - 116)  BP: 96/68 (23 Jul 2021 15:12) (93/74 - 109/71)  BP(mean): 79 (23 Jul 2021 15:12) (0 - 87)  RR: 18 (23 Jul 2021 15:52) (16 - 22)  SpO2: 100% (23 Jul 2021 15:52) (97% - 100%)  Daily     Daily     GENERAL: no acute distress  NEURO: nonverbal  HEENT: anicteric sclera, no conjunctival pallor appreciated  CHEST: no respiratory distress, no accessory muscle use  CARDIAC: regular rate, +S1/S2  ABDOMEN: soft, nondistended, nontender, no rebound or guarding  RECTAL: green-brown stool noted, no melena or chalino blood  EXTREMITIES: warm, well perfused, no edema  SKIN: no lesions noted    LABS: reviewed                        6.7    13.83 )-----------( 263      ( 23 Jul 2021 12:23 )             22.0     07-23    136  |  99  |  40<H>  ----------------------------<  142<H>  4.5   |  24  |  0.84    Ca    9.5      23 Jul 2021 06:04  Phos  3.8     07-22  Mg     2.1     07-22    TPro  8.5<H>  /  Alb  4.0  /  TBili  0.2  /  DBili  x   /  AST  20  /  ALT  18  /  AlkPhos  96  07-22    LIVER FUNCTIONS - ( 22 Jul 2021 06:47 )  Alb: 4.0 g/dL / Pro: 8.5 g/dL / ALK PHOS: 96 U/L / ALT: 18 U/L / AST: 20 U/L / GGT: x             Interval Diagnostic Studies: see sunrise for full report   Patient is a 66y old  Male who presents with a chief complaint of Anemia, Supratherapeutic INR, Dark Stools (23 Jul 2021 12:18)      Interval Events:   GI called back for continued drop in hemoglobin.  Patient is trach and nonverbal during my interview.  Unable to provide ROS.    Hospital Medications:  acetaminophen    Suspension .. 650 milliGRAM(s) Oral every 6 hours PRN  aMIOdarone    Tablet 200 milliGRAM(s) Oral daily  clonazePAM  Tablet 0.5 milliGRAM(s) Oral at bedtime  hydrALAZINE 25 milliGRAM(s) Oral every 8 hours PRN  insulin lispro (ADMELOG) corrective regimen sliding scale   SubCutaneous every 6 hours  metoprolol tartrate 25 milliGRAM(s) Oral three times a day  pantoprazole  Injectable 40 milliGRAM(s) IV Push two times a day  simethicone 80 milliGRAM(s) Chew every 8 hours PRN  vancomycin    Solution 125 milliGRAM(s) Oral daily    ROS:   Unable to provide.    PHYSICAL EXAM:   Vital Signs:  Vital Signs Last 24 Hrs  T(C): 36.4 (23 Jul 2021 15:12), Max: 36.9 (22 Jul 2021 19:00)  T(F): 97.6 (23 Jul 2021 15:12), Max: 98.5 (22 Jul 2021 19:00)  HR: 101 (23 Jul 2021 15:52) (94 - 116)  BP: 96/68 (23 Jul 2021 15:12) (93/74 - 109/71)  BP(mean): 79 (23 Jul 2021 15:12) (0 - 87)  RR: 18 (23 Jul 2021 15:52) (16 - 22)  SpO2: 100% (23 Jul 2021 15:52) (97% - 100%)  Daily     Daily     GENERAL: no acute distress  NEURO: nonverbal  HEENT: anicteric sclera, no conjunctival pallor appreciated  CHEST: no respiratory distress, no accessory muscle use  CARDIAC: regular rate, +S1/S2  ABDOMEN: soft, nondistended, nontender, no rebound or guarding  RECTAL: green-brown stool noted, no melena or chalino blood  EXTREMITIES: warm, well perfused, no edema  SKIN: no lesions noted    LABS: reviewed                        6.7    13.83 )-----------( 263      ( 23 Jul 2021 12:23 )             22.0     07-23    136  |  99  |  40<H>  ----------------------------<  142<H>  4.5   |  24  |  0.84    Ca    9.5      23 Jul 2021 06:04  Phos  3.8     07-22  Mg     2.1     07-22    TPro  8.5<H>  /  Alb  4.0  /  TBili  0.2  /  DBili  x   /  AST  20  /  ALT  18  /  AlkPhos  96  07-22    LIVER FUNCTIONS - ( 22 Jul 2021 06:47 )  Alb: 4.0 g/dL / Pro: 8.5 g/dL / ALK PHOS: 96 U/L / ALT: 18 U/L / AST: 20 U/L / GGT: x             Interval Diagnostic Studies: see sunrise for full report

## 2021-07-23 NOTE — CONSULT NOTE ADULT - ATTENDING COMMENTS
As above.    GI reconsulted for recurrent melena, blood loss anemia in setting of previous small bowel bleed on video capsule endoscopy but only old blood on single balloon enteroscopy to proximal ileum.    Heart failure on LVAD with anticoagulation.    s/p tracheostomy, failed speech/swallow evaluation, NPO with nasoenteral tube feeds.    Recommendations:    #1.  Anticoagulation per heart failure team, follow CBC, transfuse as necessary.    #2.  Plan endoscopically placed video capsule endoscopy on 7/26/21 to reassess small bowel for bleeding    #3.  Hold tube feeds Sun at midnight.    Discussed with CT surgery NP Thony As above.    GI reconsulted for recurrent melena, blood loss anemia in setting of previous small bowel bleed on video capsule endoscopy but only old blood on single balloon enteroscopy to proximal ileum.    Heart failure (nonischemic) on LVAD with anticoagulation.    s/p tracheostomy in setting of prolonged respiratory failure with contribution from aspiration pneumonia, failed speech/swallow evaluation, NPO with nasoenteral tube feeds.    Recommendations:    #1.  Anticoagulation per heart failure team, follow CBC, transfuse as necessary.    #2.  Plan endoscopically placed video capsule endoscopy on 7/26/21 to reassess small bowel for bleeding    #3.  Hold tube feeds Sun at midnight.    Discussed with CT surgery NP Thony

## 2021-07-23 NOTE — PROGRESS NOTE ADULT - SUBJECTIVE AND OBJECTIVE BOX
Subjective: " "    TELEMETRY:    VITAL SIGNS    Vital Signs Last 24 Hrs  T(C): 36.7 (07-23-21 @ 12:00), Max: 36.9 (07-22-21 @ 15:36)  T(F): 98 (07-23-21 @ 12:00), Max: 98.5 (07-22-21 @ 15:36)  HR: 110 (07-23-21 @ 12:00) (94 - 116)  BP: 101/65 (07-23-21 @ 12:00) (90/68 - 109/71)  RR: 22 (07-23-21 @ 12:00) (16 - 22)  SpO2: 100% (07-23-21 @ 12:00) (96% - 100%)            07-22 @ 07:01  -  07-23 @ 07:00  --------------------------------------------------------  IN: 1110 mL / OUT: 950 mL / NET: 160 mL       Daily     Daily   Admit Wt: Drug Dosing Weight  Height (cm): 177.8 (15 Jamil 2021 12:14)  Weight (kg): 78.5 (15 Jamil 2021 12:14)  BMI (kg/m2): 24.8 (15 Jamil 2021 12:14)  BSA (m2): 1.96 (15 Jamil 2021 12:14)      CAPILLARY BLOOD GLUCOSE      POCT Blood Glucose.: 202 mg/dL (23 Jul 2021 12:14)  POCT Blood Glucose.: 163 mg/dL (23 Jul 2021 08:06)  POCT Blood Glucose.: 142 mg/dL (23 Jul 2021 06:24)  POCT Blood Glucose.: 166 mg/dL (22 Jul 2021 23:57)  POCT Blood Glucose.: 169 mg/dL (22 Jul 2021 16:59)              PHYSICAL EXAM    Neurology: alert and oriented x 3, nonfocal, no gross deficits  CV : tele:  RSR  Sternal Wound :  CDI with dressing , Stable  Lungs: clear. RR easy, unlabored   Abdomen: soft, nontender, nondistended, positive bowel sounds, bowel movement   Neg N/V/D   :  pt voiding without difficulty   Extremities:   BLANDON; edema, neg calf tenderness.   PPP bilaterally        acetaminophen    Suspension .. 650 milliGRAM(s) Oral every 6 hours PRN  aMIOdarone    Tablet 200 milliGRAM(s) Oral daily  clonazePAM  Tablet 0.5 milliGRAM(s) Oral at bedtime  hydrALAZINE 25 milliGRAM(s) Oral every 8 hours PRN  insulin lispro (ADMELOG) corrective regimen sliding scale   SubCutaneous every 6 hours  metoprolol tartrate 25 milliGRAM(s) Oral three times a day  pantoprazole   Suspension 40 milliGRAM(s) Enteral Tube daily  simethicone 80 milliGRAM(s) Chew every 8 hours PRN  vancomycin    Solution 125 milliGRAM(s) Oral daily                         Subjective: "Im Ok "    TELEMETRY: Sinus tachy 110's    VITAL SIGNS    Vital Signs Last 24 Hrs  T(C): 36.7 (07-23-21 @ 12:00), Max: 36.9 (07-22-21 @ 15:36)  T(F): 98 (07-23-21 @ 12:00), Max: 98.5 (07-22-21 @ 15:36)  HR: 110 (07-23-21 @ 12:00) (94 - 116)  BP: 101/65 (07-23-21 @ 12:00) (90/68 - 109/71)  RR: 22 (07-23-21 @ 12:00) (16 - 22)  SpO2: 100% (07-23-21 @ 12:00) (96% - 100%)            07-22 @ 07:01  -  07-23 @ 07:00  --------------------------------------------------------  IN: 1110 mL / OUT: 950 mL / NET: 160 mL       Daily     Daily   Admit Wt: Drug Dosing Weight  Height (cm): 177.8 (15 Jamil 2021 12:14)  Weight (kg): 78.5 (15 Jamil 2021 12:14)  BMI (kg/m2): 24.8 (15 Jamil 2021 12:14)  BSA (m2): 1.96 (15 Jamil 2021 12:14)      CAPILLARY BLOOD GLUCOSE      POCT Blood Glucose.: 202 mg/dL (23 Jul 2021 12:14)  POCT Blood Glucose.: 163 mg/dL (23 Jul 2021 08:06)  POCT Blood Glucose.: 142 mg/dL (23 Jul 2021 06:24)  POCT Blood Glucose.: 166 mg/dL (22 Jul 2021 23:57)  POCT Blood Glucose.: 169 mg/dL (22 Jul 2021 16:59)              PHYSICAL EXAM    Neurology: alert and oriented x 3, nonfocal, no gross deficits  CV :+ LVAD hum  Lungs: clear b/l. RR easy, unlabored   Abdomen: +tenderness to palpitation nondistended, positive bowel sounds, +bowel movement     Extremities:   BLANDON; No LE edema, neg calf tenderness.   PPP bilaterally        acetaminophen    Suspension .. 650 milliGRAM(s) Oral every 6 hours PRN  aMIOdarone    Tablet 200 milliGRAM(s) Oral daily  clonazePAM  Tablet 0.5 milliGRAM(s) Oral at bedtime  hydrALAZINE 25 milliGRAM(s) Oral every 8 hours PRN  insulin lispro (ADMELOG) corrective regimen sliding scale   SubCutaneous every 6 hours  metoprolol tartrate 25 milliGRAM(s) Oral three times a day  pantoprazole   Suspension 40 milliGRAM(s) Enteral Tube daily  simethicone 80 milliGRAM(s) Chew every 8 hours PRN  vancomycin    Solution 125 milliGRAM(s) Oral daily                         Subjective: "Im Ok "    TELEMETRY: Sinus tachy 110's    VITAL SIGNS    Vital Signs Last 24 Hrs  T(C): 36.7 (07-23-21 @ 12:00), Max: 36.9 (07-22-21 @ 15:36)  T(F): 98 (07-23-21 @ 12:00), Max: 98.5 (07-22-21 @ 15:36)  HR: 110 (07-23-21 @ 12:00) (94 - 116)  BP: 101/65 (07-23-21 @ 12:00) (90/68 - 109/71)  RR: 22 (07-23-21 @ 12:00) (16 - 22)  SpO2: 100% (07-23-21 @ 12:00) (96% - 100%)            07-22 @ 07:01  -  07-23 @ 07:00  --------------------------------------------------------  IN: 1110 mL / OUT: 950 mL / NET: 160 mL       Daily     Daily   Admit Wt: Drug Dosing Weight  Height (cm): 177.8 (15 Jamil 2021 12:14)  Weight (kg): 78.5 (15 Jamil 2021 12:14)  BMI (kg/m2): 24.8 (15 Jamil 2021 12:14)  BSA (m2): 1.96 (15 Jamil 2021 12:14)      CAPILLARY BLOOD GLUCOSE      POCT Blood Glucose.: 202 mg/dL (23 Jul 2021 12:14)  POCT Blood Glucose.: 163 mg/dL (23 Jul 2021 08:06)  POCT Blood Glucose.: 142 mg/dL (23 Jul 2021 06:24)  POCT Blood Glucose.: 166 mg/dL (22 Jul 2021 23:57)  POCT Blood Glucose.: 169 mg/dL (22 Jul 2021 16:59)              PHYSICAL EXAM    Neurology: alert and oriented x 3, nonfocal, no gross deficits  CV :+ LVAD hum  Lungs: +Trach size 7 portex clear b/l. RR easy, unlabored   Abdomen: +tenderness to palpitation nondistended, positive bowel sounds, +bowel movement     Extremities:   BLANDON; No LE edema, neg calf tenderness.   PPP bilaterally        acetaminophen    Suspension .. 650 milliGRAM(s) Oral every 6 hours PRN  aMIOdarone    Tablet 200 milliGRAM(s) Oral daily  clonazePAM  Tablet 0.5 milliGRAM(s) Oral at bedtime  hydrALAZINE 25 milliGRAM(s) Oral every 8 hours PRN  insulin lispro (ADMELOG) corrective regimen sliding scale   SubCutaneous every 6 hours  metoprolol tartrate 25 milliGRAM(s) Oral three times a day  pantoprazole   Suspension 40 milliGRAM(s) Enteral Tube daily  simethicone 80 milliGRAM(s) Chew every 8 hours PRN  vancomycin    Solution 125 milliGRAM(s) Oral daily

## 2021-07-23 NOTE — PROGRESS NOTE ADULT - ASSESSMENT
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 coli UTIs, driveline drainage (1/7/21) and COVID-19 (back in April 2020)  He was recently seen in clinic where he complained of abdominal pain and dark stools w constipation back in May. He presents to [facility] ER today weakness and fatigue, moderate and + Black stools for three days, on coumadin secondary to warfarin use in the setting of an LVAD. Patient has required transfusions for GIB in the past. Mostly recently back in jan 2021 pt had anemia with dark stools. No interventions was done at that time. However Last Endoscopy was done in July 2020 (negative). Today labs show patient is anemic with H/H of 4.5/16.3,. INR is 8.84 MAP in the 90s, Temp 35.1. He denies any chest pain, shortness of breath, dizziness, abd pain, nausea or vomiting.      Surgery/Hospital Course:  6/14 admit for melena w/ anemia, INR 8.84   6/15 Capsul study (+) for small bowel bleed, balloon endoscopy (old blood in prox ileum); post EGD - septic w/ L opacity, re-intubated for concern for aspiration, TTE (Mod MR, decrease biV w/ interventricular septum boweing towards R)  6/17 bronch   6/20 +C Diff   6/22 TC   6/22 CT C/A/P: Fluid filled colon which may be 2/2 rapid transit. Small bilateral pleffs with associates. Compressive atelectasis New ISABELLE & LLL  parenchymal opacities, suspicious for pneumonia. 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   6/25 #8 Shiley trach at bedside   6/28 CPAP trials   7/1 LVAD speed increased to 9200  7/2 Bronch; Central line dc'ed  7/20 TC since 7/7, continue as tolerated. Patient transferred to SDU.   7/21 Cont PT, no trach downsize today(#8cuffed)  Anticoagulation  Continue TF, speech f/u  Vanco taper  7/22  oob to chair  INR  2.47  5 mg coumadin     increased lop to 25 q8  per HF  7/23 INR today 2.64.  H&H 7.3/24 this AM.  Will repeat CBC at noon, and will send  stool guaiac Patient with persistent abdominal tenderness, rate of tube feeds decreased.  No nausea/vomiting.     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 coli UTIs, driveline drainage (1/7/21) and COVID-19 (back in April 2020)  He was recently seen in clinic where he complained of abdominal pain and dark stools w constipation back in May. He presents to [facility] ER today weakness and fatigue, moderate and + Black stools for three days, on coumadin secondary to warfarin use in the setting of an LVAD. Patient has required transfusions for GIB in the past. Mostly recently back in jan 2021 pt had anemia with dark stools. No interventions was done at that time. However Last Endoscopy was done in July 2020 (negative). Today labs show patient is anemic with H/H of 4.5/16.3,. INR is 8.84 MAP in the 90s, Temp 35.1. He denies any chest pain, shortness of breath, dizziness, abd pain, nausea or vomiting.      Surgery/Hospital Course:  6/14 admit for melena w/ anemia, INR 8.84   6/15 Capsul study (+) for small bowel bleed, balloon endoscopy (old blood in prox ileum); post EGD - septic w/ L opacity, re-intubated for concern for aspiration, TTE (Mod MR, decrease biV w/ interventricular septum boweing towards R)  6/17 bronch   6/20 +C Diff   6/22 TC   6/22 CT C/A/P: Fluid filled colon which may be 2/2 rapid transit. Small bilateral pleffs with associates. Compressive atelectasis New ISABELLE & LLL  parenchymal opacities, suspicious for pneumonia. 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   6/25 #8 Shiley trach at bedside   6/28 CPAP trials   7/1 LVAD speed increased to 9200  7/2 Bronch; Central line dc'ed  7/20 TC since 7/7, continue as tolerated. Patient transferred to SDU.   7/21 Cont PT, no trach downsize today(#8cuffed)  Anticoagulation  Continue TF, speech f/u  Vanco taper  7/22  oob to chair  INR  2.47  5 mg coumadin     increased lop to 25 q8  per HF  7/23 INR today 2.64.  H&H 7.3/24 this AM.  Will repeat CBC at noon, and will send stool guaiac Patient with persistent abdominal tenderness, rate of tube feeds decreased.  No nausea/vomiting.

## 2021-07-23 NOTE — PROGRESS NOTE ADULT - SUBJECTIVE AND OBJECTIVE BOX
Interval History: Hgb continues to trend down, noting abdominal pain. Possible dark stools    Medications:  acetaminophen    Suspension .. 650 milliGRAM(s) Oral every 6 hours PRN  aMIOdarone    Tablet 200 milliGRAM(s) Oral daily  clonazePAM  Tablet 0.5 milliGRAM(s) Oral at bedtime  hydrALAZINE 25 milliGRAM(s) Oral every 8 hours PRN  insulin lispro (ADMELOG) corrective regimen sliding scale   SubCutaneous every 6 hours  metoprolol tartrate 25 milliGRAM(s) Oral three times a day  pantoprazole   Suspension 40 milliGRAM(s) Enteral Tube daily  simethicone 80 milliGRAM(s) Chew every 8 hours PRN  vancomycin    Solution 125 milliGRAM(s) Oral daily    Vitals:  T(C): 36.4 (21 @ 08:00), Max: 36.9 (21 @ 15:36)  HR: 112 (21 @ 09:47) (94 - 116)  BP: 109/71 (21 @ 08:00) (90/68 - 109/71)  BP(mean): 84 (21 @ 08:00) (0 - 87)  ABP: --  ABP(mean): --  RR: 18 (21 @ 09:47) (16 - 20)  SpO2: 100% (21 @ 09:47) (96% - 100%)    Daily     Daily Weight in k.8 (2021 12:00)        I&O's Summary    2021 07:01  -  2021 07:00  --------------------------------------------------------  IN: 1110 mL / OUT: 950 mL / NET: 160 mL    Physical Exam:  Appearance: Laying flat in bed  HEENT: EOMI  Neck: No JVD appreciated  Cardiovascular: +LVAD hum  Respiratory: Clear to auscultation bilaterally  Gastrointestinal: Soft, mildly tender to palpation	  Skin: No cyanosis	  Neurologic: Non-focal  Extremities: No LE edema  Psychiatry: A & O x 3, Mood & affect appropriate    LVAD Interrogation:  Pump Speed: 9200  Pump Flow: 4.5  Pulse Index: 6.3  Pump Power: 5.1  VAD Events: Low flow alarms, PI changes  Driveline evaluation: C/D/I  Programming Changes: No changes made    Labs:                        7.3    13.60 )-----------( 278      ( 2021 06:04 )             24.4     07-23    136  |  99  |  40<H>  ----------------------------<  142<H>  4.5   |  24  |  0.84    Ca    9.5      2021 06:04  Phos  3.8     07-22  Mg     2.1     07-22    TPro  8.5<H>  /  Alb  4.0  /  TBili  0.2  /  DBili  x   /  AST  20  /  ALT  18  /  AlkPhos  96  07-22    PT/INR - ( 2021 06:04 )   PT: 30.2 sec;   INR: 2.64 ratio         PTT - ( 2021 06:04 )  PTT:31.5 sec      TELEMETRY: sinus rhythm    Echocardiogram:  e< from: Transthoracic Echocardiogram (21 @ 19:30) >  Conclusions:  1. Tethered mitral valve leaflets with normal opening.  Moderate mitral regurgitation.VC-0.37 cm.  2. Normal trileaflet aortic valve.Leaflets open with every  cardiac cycle. Mild-moderate aortic regurgitation. Vena  contracta-0.21 cm.  3. Mild left ventricular enlargement.  4. HeartMate III at  9,000 RPM. Severe global left  ventricular systolic dysfunction. Septum appears midline.  LVAD cannula at the LV apex. Septal motion consistent with  cardiac surgery.  5. Normal right ventricular size with decreased right  ventricular systolic function.TAPSE 1.2 cm.    < end of copied text >

## 2021-07-23 NOTE — PROGRESS NOTE ADULT - SUBJECTIVE AND OBJECTIVE BOX
RICKY JOINT  MRN#: [id number]  Subjective:  Pulmonary progress  : recurrent Acute hypoxic respiratory Failure ,aspiration pneumonia, NICM  , chart reviewed and H/O obtained radiological and Laboratory study reviewed patient Examined     65M PMH ACC/AHA stage D HF due to NICM HM2 LVAD , TV annuloplasty ring 17 as destination therapy due to severe peripheral artery disease with significant stenosis  SIADH, Depression, CKD-3 with hyperkalemia, past E. coli UTIs, driveline drainage (21) and COVID-19 (back in 2020)  He was recently seen in clinic where he complained of abdominal pain and dark stools w constipation back in May. He presents to [facility] ER today weakness and fatigue, moderate and + Black stools for three days, on coumadin secondary to warfarin use in the setting of an LVAD. Patient has required transfusions for GIB in the past. Mostly recently back in 2021 pt had anemia with dark stools. No interventions was done at that time. However Last Endoscopy was done in 2020 (negative). Today labs show patient is anemic with H/H of 4.5/16.3,. INR is 8.84 MAP in the 90s, Temp 35.1. He denies any chest pain, shortness of breath, dizziness, abd pain, nausea or vomiting. found to have  rectal bleeding underwent endoscopy ,old blood in the proximal ileum ,  develop sepsis with LL opacity given Antibiotics , Extubated , reintubated , Bronchoscopy on Zosyn for LL pneumonia  and Amiodarone S/P TV Annuloplasty , patient remain intubated on full ventilatory support .S/P multiple units of blood transfusion , remain on full ventilatory support on Precedex and propofol , new central IJ line , diarrhea C diff. +ve on po vancomycin and IV Flagyl,  mildly distended belly , fever start on cefepime 2gm q 8 hrs S/P tracheostomy .  new RT Subclavian central line continue on contact  isolation ,still diarrhea on C-diff antibiotics ENT follow up appreciated , trial of C-PAP as tolerated , , copious secretion from trach. site chest x ray left lower lobe pneumonia , tolerating trch. collar 50% FI02 still excessive secretion need pulmonary toilet , off Ancef antibiotic , no more diarrhea back on full support mechanical ventilator , chest x ray show improvement in LLL air space disease, more awake and responsive on tube feeding no more diarrhea , S/P  Ancef for bacteremia no fever ,ID follow up noted ,  no nausea or vomiting or diarrhea still very weak and tired , event note pulled NG tube now replaced , back on tube feeding ,still on po vancomycin , getting PT and OT at bed side , no plan for decannulation for now. , no more diarrhea receiving PT and OPT at bed side , minimal secretion from tracheostomy site , no SOB getting stronger , improve muscle tone patient transfer to monitor bed still on contact isolation for C-Difficel colitis on 50% FI02, NG tube clogged , and change to resume tube feeding still loose stool . H/H drop significantly require blood transfusion , most likely GI bleeding , IV heparin D/C          (2021 16:57)    PAST MEDICAL & SURGICAL HISTORY:  CHF (congestive heart failure)    CAD (coronary artery disease)  Depression    Pleural effusion    History of 2019 novel coronavirus disease (COVID-19)  2020    Hemorrhoids    Bleeding hemorrhoids    Peripheral arterial disease    Claudication    BPH with urinary obstruction    ACC/AHA stage D systolic heart failure  Anticoagulation goal of INR 2.0 to 2.5    Falls    Clavicle fracture    CKD (chronic kidney disease), stage III    Iron deficiency anemia    H/O epistaxis    Vertigo    GI bleed    S/P TVR (tricuspid valve repair)    S/P ventricular assist device    S/P endoscopy    OBJECTIVE:  ICU Vital Signs Last 24 Hrs  T(C): 38.2 (2021 10:00), Max: 38.5 (2021 12:00)  T(F): 100.8 (2021 10:00), Max: 101.3 (2021 12:00)  HR: 65 (2021 10:00) (61 - 69)  BP: --  BP(mean): --  ABP: 105/67 (2021 10:00) (90/54 - 113/64)  ABP(mean): 77 (2021 10:00) (63 - 77)  RR: 20 (2021 10:00) (19 - 35)  SpO2: 99% (2021 10:00) (96% - 100%)      -19 @ 07:01  -  06-20 @ 07:00  --------------------------------------------------------  IN: 2693.9 mL / OUT: 1415 mL / NET: 1278.9 mL     @ 07:01   @ 10:49  --------------------------------------------------------  IN: 420.8 mL / OUT: 115 mL / NET: 305.8 mL  PHYSICAL EXAM:Daily   Elderly male S/P tracheostomy   on  trach. collar 50% FI02   Daily Weight in k.4 (2021 00:00)  HEENT:     + NCAT  + EOMI  - Conjuctival edema   - Icterus   - Thrush   - ETT  + NGT/OGT  Neck:         + FROM   RT SC line  JVD     - Nodes     - Masses    + Mid-line trachea + Tracheostomy  Chest: normal findings  Lungs:          + CTA   + Rhonchi    - Rales    - Wheezing + Decreased  LT BS   - Dullness R L  Cardiac:       + S1 + S2    + RRR   - Irregular   - S3  - S4    - Murmurs   - Rub   - Hamman’s sign   Abdomen:    + BS     + Soft    + Non-tender     - Distended    - Organomegaly  - PEG  Extremities:   - Cyanosis U/L   - Clubbing  U/L  + LE/UE Edema   + Capillary refill    + Pulses   Neuro:        - Awake   -  Alert   - Confused   - Lethargic   + Sedated  + Generalized Weakness  Skin:        - Rashes    - Erythema   + Normal incisions   + IV sites intact          HOSPITAL MEDICATIONS: All mediciations reviewed and analyzed  MEDICATIONS  (STANDING):  aMIOdarone    Tablet 200 milliGRAM(s) Oral daily  chlorhexidine 0.12% Liquid 15 milliLiter(s) Oral Mucosa every 12 hours  chlorhexidine 2% Cloths 1 Application(s) Topical <User Schedule>  dexMEDEtomidine Infusion 0.5 MICROgram(s)/kG/Hr (9.81 mL/Hr) IV Continuous <Continuous>  dextrose 50% Injectable 50 milliLiter(s) IV Push every 15 minutes  heparin  Infusion 400 Unit(s)/Hr (12.5 mL/Hr) IV Continuous <Continuous>  HYDROmorphone  Injectable 0.5 milliGRAM(s) IV Push once  insulin lispro (ADMELOG) corrective regimen sliding scale   SubCutaneous every 6 hours  pantoprazole  Injectable 40 milliGRAM(s) IV Push every 12 hours  piperacillin/tazobactam IVPB.. 3.375 Gram(s) IV Intermittent every 8 hours  propofol Infusion 20 MICROgram(s)/kG/Min (9.42 mL/Hr) IV Continuous <Continuous>  sodium chloride 0.9% lock flush 3 milliLiter(s) IV Push every 8 hours  sodium chloride 0.9%. 1000 milliLiter(s) (10 mL/Hr) IV Continuous <Continuous>    MEDICATIONS  (PRN):  acetaminophen    Suspension .. 650 milliGRAM(s) Oral every 6 hours PRN Temp greater or equal to 38C (100.4F)    LABS: All Lab data reviewed and analyzed                        6.7    13.83 )-----------( 263      ( 2021 12:23 )             22.0   07-23    136  |  99  |  40<H>  ----------------------------<  142<H>  4.5   |  24  |  0.84    Ca    9.5      2021 06:04  Phos  3.8     07-22  Mg     2.1     07-22    TPro  8.5<H>  /  Alb  4.0  /  TBili  0.2  /  DBili  x   /  AST  20  /  ALT  18  /  AlkPhos  96  07-22    Ca    9.9      2021 06:47  Phos  3.8     07-22  Mg     2.1     07-22    TPro  8.5<H>  /  Alb  4.0  /  TBili  0.2  /  DBili  x   /  AST  20  /  ALT  18  /  AlkPhos  96  07-22    Ca    10.6<H>      2021 01:00  Phos  3.8     07-20  Mg     2.0     07-20    TPro  9.5<H>  /  Alb  4.7  /  TBili  0.3  /  DBili  x   /  AST  26  /  ALT  22  /  AlkPhos  108  07-20      Ca    10.6<H>      2021 00:28  Phos  4.0     07-19  Mg     2.2     07-19    TPro  9.8<H>  /  Alb  4.9  /  TBili  0.3  /  DBili  x   /  AST  28  /  ALT  22  /  AlkPhos  114  07-19    Ca    10.7<H>      2021 01:04  Phos  3.5     07-18  Mg     2.0     07-18    TPro  9.5<H>  /  Alb  4.4  /  TBili  0.3  /  DBili  x   /  AST  26  /  ALT  23  /  AlkPhos  108  07-18    Ca    10.2      2021 01:25  Phos  3.4     07-17  Mg     1.9     07-17    TPro  9.0<H>  /  Alb  4.5  /  TBili  0.3  /  DBili  x   /  AST  28  /  ALT  21  /  AlkPhos  95  07-17                                                     PTT - ( 2021 04:52 )  PTT:45.2 sec LIVER FUNCTIONS - ( 2021 00:42 )  Alb: 3.4 g/dL / Pro: 6.7 g/dL / ALK PHOS: 213 U/L / ALT: 15 U/L / AST: 24 U/L / GGT: x           RADIOLOGY: - Reviewed and analyzed LLL  pneumonia , LVAD HM2, CT scan of abdomen reviewed result noted

## 2021-07-23 NOTE — PROGRESS NOTE ADULT - PROBLEM SELECTOR PLAN 1
- Continue metoprolol tartrate 25mg TID  - hold diuretics in the setting of fluid losses and concern for possible bleed   - Continue home amiodarone 200 mg PO daily - HOLD metoprolol tartrate 25mg TID - low flow alarms in setting of hypovolemia  - hold diuretics in the setting of fluid losses and concern for possible bleed   - Continue home amiodarone 200 mg PO daily

## 2021-07-23 NOTE — PROGRESS NOTE ADULT - PROBLEM SELECTOR PLAN 2
-Current speed 9200 RPM  -Continue coumadin for goal INR 2-3  -Will plan to hold aspirin indefinitely given recurrent GIB.  -Continue to monitor LDH daily. -Current speed 9200 RPM  - HOLD coumadin for goal INR 2-3  -Will plan to hold aspirin indefinitely given recurrent GIB.  -Continue to monitor LDH daily.

## 2021-07-23 NOTE — PROGRESS NOTE ADULT - ASSESSMENT
Assessment and Recommendation:   · Assessment	  Assessment and recommendation :  Recurrent Acute hypoxic respiratory Failure S/P tracheostomy on full ventilatory support alternating with trach. collar at 50% Fi02  Acute blood loss anemia S/P blood transfusion   S/P Acute left lower lobe pneumonia   Aspiration pneumonia   Stool is  +ve for C-diff. colitis on PO vancomycin  improved   S/P  Ancef improved for bacteremia   Non ischemic cardiomyopathy continue ACE inhibitor and B-Blockers   S/P Septic shock and cardiogenic shock   Stage D systolic heart failure S/P LVAD HM2   MH2 LVAD  with  TV Annuloplasty  Severe peripheral vascular disease   S/P Anion gap metabolic acidosis  severe hyperglycemia on insulin coverage    On  Amiodarone   critical care polyneuropathy   Anemia of Acute blood Loss   S/P Small bowel bleeding   S/P blood and FFP transfusion   Chronic kidney disease stage III  Continue NGT feeding   PT and OT at bed side   GI prophylaxis  discuss with TCV surgeon

## 2021-07-23 NOTE — CONSULT NOTE ADULT - ASSESSMENT
Mr. Vic Quispe initially admitted for melena and anemia in the setting of supratherapeutic INR.  GI reconsulted for worsening anemia.    # Acute blood loss anemia  # Recent melena with presumed small bowel source  # LVAD on anticoagulation with coumadin  Patient has LVAD with prolonged hospitalization, now s/p trach/PEG.  Initial melena workup lead to capsule endoscopy on 6/14/2021 which revealed active bleeding in small bowel.  Single balloon 6/15/2021 revealed old blood in proximal ileum.  Now, hemoglobin has continued to drift down to 6.7 on 7/23/2021 prompting pRBC transfusion.    -trend vitals, CBC, and monitor for clinical signs of bleeding  -transfuse for goal hemoglobin >/= 7.0  -keep NPO for now with plan to drop capsule tomorrow  -Cardiology managing anticoagulation, however will consider further endoscopy/enteroscopy pending clinical course and results of repeat capsule endoscopy      Tito Guerrier, PGY-4  GI/Hepatology Fellow    MONDAY-FRIDAY 8AM-5PM:  Available via Microsoft Teams  Pager# [phone] ([facility]) or [phone] ([facility])  GI Phone# [phone] ([facility])    NON-URGENT CONSULTS:  Please email [email or web address] OR [email or web address]  AT NIGHT AND ON WEEKENDS:  Contact on-call GI fellow via answering service ([phone]) from 5pm-8am and on weekends/holidays       Mr. Vic Quispe initially admitted for melena and anemia in the setting of supratherapeutic INR.  GI reconsulted for worsening anemia.    # Acute blood loss anemia  # Recent melena with presumed small bowel source  # LVAD on anticoagulation with coumadin  Patient has LVAD with prolonged hospitalization, now s/p trach.  Initial melena workup lead to capsule endoscopy on 6/14/2021 which revealed active bleeding in small bowel.  Single balloon 6/15/2021 revealed old blood in proximal ileum.  Now, hemoglobin has continued to drift down to 6.7 on 7/23/2021 prompting pRBC transfusion.    -trend vitals, CBC, and monitor for clinical signs of bleeding  -transfuse for goal hemoglobin >/= 7.0  -keep NPO for now with plan to drop capsule tomorrow  -Cardiology managing anticoagulation, however will consider further endoscopy/enteroscopy pending clinical course and results of repeat capsule endoscopy      Tito Guerrier, PGY-4  GI/Hepatology Fellow    MONDAY-FRIDAY 8AM-5PM:  Available via Microsoft Teams  Pager# [phone] ([facility]) or [phone] ([facility])  GI Phone# [phone] ([facility])    NON-URGENT CONSULTS:  Please email [email or web address] OR [email or web address]  AT NIGHT AND ON WEEKENDS:  Contact on-call GI fellow via answering service ([phone]) from 5pm-8am and on weekends/holidays       Mr. Vic Quispe initially admitted for melena and anemia in the setting of supratherapeutic INR.  GI reconsulted for worsening anemia.    # Acute blood loss anemia  # Recent melena with presumed small bowel source  # LVAD on anticoagulation with coumadin  Patient has LVAD with prolonged hospitalization, now s/p trach.  Initial melena workup lead to capsule endoscopy on 6/14/2021 which revealed active bleeding in small bowel.  Single balloon 6/15/2021 revealed old blood in proximal ileum.  Now, hemoglobin has continued to drift down to 6.7 on 7/23/2021 prompting pRBC transfusion.    -trend vitals, CBC, and monitor for clinical signs of bleeding  -transfuse for goal hemoglobin >/= 7.0  -keep NPO Sunday night with plans to drop capsule on Monday  -Cardiology managing anticoagulation, however will consider further endoscopy/enteroscopy pending clinical course and results of repeat capsule endoscopy      Tito Guerrier, PGY-4  GI/Hepatology Fellow    MONDAY-FRIDAY 8AM-5PM:  Available via Microsoft Teams  Pager# [phone] ([facility]) or [phone] ([facility])  GI Phone# [phone] ([facility])    NON-URGENT CONSULTS:  Please email [email or web address]  AT NIGHT AND ON WEEKENDS:  Contact on-call GI fellow via answering service ([phone]) from 5pm-8am and on weekends/holidays

## 2021-07-23 NOTE — PROGRESS NOTE ADULT - PROBLEM SELECTOR PLAN 2
-Current speed 9200 RPM  -c/w coumadin for goal INR 2-3   - Plan to hold aspirin indefinitely given recurrent GIB.  -Continue to monitor LDH daily -Current speed 9200 RPM  INR 2.62 on 7/23   - Plan to hold aspirin indefinitely given recurrent GIB.  -Continue to monitor LDH daily

## 2021-07-24 NOTE — PROGRESS NOTE ADULT - PROBLEM SELECTOR PLAN 3
- No active bleeding seen on past enteroscopy  - more recently, downtrending Hgb despite therapeutic INR   - s/p 2U PRBC 7/23 for Hgb 6.7 with appropriate response  - positive stool guaiac 7/23  - plan for repeat VCE 7/26  -Continue PPI  - transfuse PRN

## 2021-07-24 NOTE — PROGRESS NOTE ADULT - SUBJECTIVE AND OBJECTIVE BOX
Subjective:  - no reports of melena or hematochezia per staff; planned for capsule study Monday  - ongoing LLQ tenderness to palpation    Medications:  acetaminophen    Suspension .. 650 milliGRAM(s) Oral every 6 hours PRN  aMIOdarone    Tablet 200 milliGRAM(s) Oral daily  clonazePAM  Tablet 0.5 milliGRAM(s) Oral at bedtime  hydrALAZINE 25 milliGRAM(s) Oral every 8 hours PRN  insulin lispro (ADMELOG) corrective regimen sliding scale   SubCutaneous every 6 hours  metoprolol tartrate 25 milliGRAM(s) Oral three times a day  pantoprazole  Injectable 40 milliGRAM(s) IV Push two times a day  simethicone 80 milliGRAM(s) Chew every 8 hours PRN  vancomycin    Solution 125 milliGRAM(s) Oral daily      Physical Exam:    Vitals:  Vital Signs Last 24 Hours  T(C): 36.3 (21 @ 10:00), Max: 37 (21 @ 02:00)  HR: 100 (21 @ 10:00) (100 - 149)  BP: 105/61 (21 @ 10:00) (94/70 - 121/75)  RR: 20 (21 @ 10:00) (18 - 22)  SpO2: 100% (21 @ 10:00) (95% - 100%)    Weight in k.2 ( @ 09:00)    I&O's Summary    2021 07:  -  2021 07:00  --------------------------------------------------------  IN: 1160 mL / OUT: 1250 mL / NET: -90 mL    2021 07:  -  2021 14:12  --------------------------------------------------------  IN: 0 mL / OUT: 0 mL / NET: 0 mL    Appearance: No distress laying flat in bed  HEENT: EOMI  Neck: JVP does not appear elevated   Cardiovascular: +LVAD hum  Respiratory: Clear to auscultation bilaterally  Gastrointestinal: Soft, LLQ mildly tender to palpation	  Skin: No cyanosis	  Neurologic: Non-focal  Extremities: No LE edema  Psychiatry: A & O x 3, Mood & affect appropriate    LVAD Interrogation:  Pump Speed: 9200  Pump Flow: 4.5  Pulse Index: 6.3  Pump Power: 5.1  VAD Events: multiple Low flow alarms with lowest flow of 2.3 LPM   Driveline evaluation: C/D/I  Programming Changes: No changes made    Labs:                        9.1    25.01 )-----------( 254      ( 2021 10:28 )             28.6         135  |  100  |  36<H>  ----------------------------<  137<H>  4.8   |  25  |  0.88    Ca    9.5      2021 07:23  Phos  2.9       Mg     2.0         TPro  7.7  /  Alb  3.8  /  TBili  0.6  /  DBili  x   /  AST  22  /  ALT  17  /  AlkPhos  76      PT/INR - ( 2021 07:23 )   PT: 27.7 sec;   INR: 2.41 ratio         PTT - ( 2021 06:04 )  PTT:31.5 sec          Lactate Dehydrogenase, Serum: 260 U/L ( @ 07:23)  Lactate Dehydrogenase, Serum: 234 U/L ( @ 06:04)  Lactate Dehydrogenase, Serum: 263 U/L ( @ 06:47)

## 2021-07-24 NOTE — PROGRESS NOTE ADULT - SUBJECTIVE AND OBJECTIVE BOX
Subjective " hello on ATC"    VITAL SIGNS    Telemetry:      Vital Signs Last 24 Hrs  T(C): 36.3 (21 @ 10:00), Max: 37 (21 @ 02:00)  T(F): 97.4 (21 @ 10:00), Max: 98.6 (21 @ 02:00)  HR: 100 (21 @ 10:00) (100 - 149)  BP: 105/61 (21 @ 10:00) (94/70 - 121/75)  RR: 20 (21 @ 10:00) (18 - 22)  SpO2: 100% (21 @ 10:00) (95% - 100%)            @ 07:01  -   @ 07:00  --------------------------------------------------------  IN: 1160 mL / OUT: 1250 mL / NET: -90 mL     @ 07:01  -   @ 12:15  --------------------------------------------------------  IN: 0 mL / OUT: 0 mL / NET: 0 mL  Daily Weight in k.2 (2021 09:00)    9200  4.8  5.3  5.9                          9.1    25.01 )-----------( 254      ( 2021 10:28 )             28.6     07-24    135  |  100  |  36<H>  ----------------------------<  137<H>  4.8   |  25  |  0.88    Ca    9.5      2021 07:23  Phos  2.9     07-24  Mg     2.0     0724    TPro  7.7  /  Alb  3.8  /  TBili  0.6  /  DBili  x   /  AST  22  /  ALT  17  /  AlkPhos  76  07-24    MEDICATIONS  (STANDING):  aMIOdarone    Tablet 200 milliGRAM(s) Oral daily  clonazePAM  Tablet 0.5 milliGRAM(s) Oral at bedtime  insulin lispro (ADMELOG) corrective regimen sliding scale   SubCutaneous every 6 hours  metoprolol tartrate 25 milliGRAM(s) Oral three times a day  pantoprazole  Injectable 40 milliGRAM(s) IV Push two times a day  vancomycin    Solution 125 milliGRAM(s) Oral daily    MEDICATIONS  (PRN):  acetaminophen    Suspension .. 650 milliGRAM(s) Oral every 6 hours PRN Mild Pain (1 - 3)  hydrALAZINE 25 milliGRAM(s) Oral every 8 hours PRN MAP greater than 85  simethicone 80 milliGRAM(s) Chew every 8 hours PRN Dyspepsia      Bilirubin Total, Serum: 0.6 mg/dL ( @ 07:23)    CAPILLARY BLOOD GLUCOSE      POCT Blood Glucose.: 143 mg/dL (2021 11:52)  POCT Blood Glucose.: 191 mg/dL (2021 08:02)  POCT Blood Glucose.: 139 mg/dL (2021 06:18)  POCT Blood Glucose.: 128 mg/dL (2021 00:18)  POCT Blood Glucose.: 143 mg/dL (2021 17:57)         ]    Coumadin    [x ] YES          [  ]      NO         Reason:  ON hOld                             PHYSICAL EXAM        Neurology: alert and oriented x 3, nonfocal, no gross deficits    CV :LVAD sounds    Sternal Wound :  Healed TAI    drive line anchored stable  Lungs: B/l breath sounds ATC  shiley#[id number]    Abdomen: soft, nontender, nondistended, positive bowel sounds, last bowel movement  soft black stool    : voids              Extremities: warm well perfused   B/lle warm well perfused                                           Physical Therapy Rec:   Home  [  ]   Home w/ PT  [  ]  Rehab  [x  ]    Discussed with Cardiothoracic Team at AM rounds.

## 2021-07-24 NOTE — PROGRESS NOTE ADULT - ASSESSMENT
67 YO M with a history of stage D NICM s/p HM2 on 9/2017 as DT (due to severe PAD) with TV ring, prior COVID-19 infection 4/2020, recurrent syncope post LVAD s/p ILR, and chronic abdominal pain with prior negative workup who was admitted with symptomatic anemia with Hgb 4.5 in setting of INR 8.8 without hemodynamic instability. He was transfused and underwent VCE which showed active bleeding in the mid small bowel but subsequent enteroscopy 6/15 did not reveal any active bleeding. He acutely decompensated after procedure with fever/hypertension, low flow alarms, and pulmonary infiltrate with hypoxia requiring intubation from probable aspiration PNA. His LDH is normal and there are no signs of overt LVAD dysfunction on echo though signs of insufficiently unloaded ventricle for which his speed has been increased. Course notable for inability to wean ventilator with persistent secretions for which he underwent tracheostomy as well as acute c diff colitis. Worsening anemia over last few days requiring transfusion with low flow alarms, concerning for recurrent GI bleed for which he is planned for repeat VCE on Monday 7/26 and AC is being held.    Primary goals at this time is to progress towards tracheostomy decannulation and continue physical therapy for severe deconditioning.

## 2021-07-24 NOTE — PROGRESS NOTE ADULT - PROBLEM SELECTOR PLAN 1
- continue metoprolol tartrate 25mg TID   - hold diuretics in the setting of fluid losses and concern for possible bleed   - Continue home amiodarone 200 mg PO daily

## 2021-07-24 NOTE — PROGRESS NOTE ADULT - ASSESSMENT
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 coli UTIs, driveline drainage (1/7/21) and COVID-19 (back in April 2020)  He was recently seen in clinic where he complained of abdominal pain and dark stools w constipation back in May. He presents to [facility] ER today weakness and fatigue, moderate and + Black stools for three days, on coumadin secondary to warfarin use in the setting of an LVAD. Patient has required transfusions for GIB in the past. Mostly recently back in jan 2021 pt had anemia with dark stools. No interventions was done at that time. However Last Endoscopy was done in July 2020 (negative). Today labs show patient is anemic with H/H of 4.5/16.3,. INR is 8.84 MAP in the 90s, Temp 35.1. He denies any chest pain, shortness of breath, dizziness, abd pain, nausea or vomiting.      Surgery/Hospital Course:  6/14 admit for melena w/ anemia, INR 8.84   6/15 Capsul study (+) for small bowel bleed, balloon endoscopy (old blood in prox ileum); post EGD - septic w/ L opacity, re-intubated for concern for aspiration, TTE (Mod MR, decrease biV w/ interventricular septum boweing towards R)  6/17 bronch   6/20 +C Diff   6/22 TC   6/22 CT C/A/P: Fluid filled colon which may be 2/2 rapid transit. Small bilateral pleffs with associates. Compressive atelectasis New ISABELLE & LLL  parenchymal opacities, suspicious for pneumonia. 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   6/25 #8 Shiley trach at bedside   6/28 CPAP trials   7/1 LVAD speed increased to 9200  7/2 Bronch; Central line dc'ed  7/20 TC since 7/7, continue as tolerated. Patient transferred to SDU.   7/21 Cont PT, no trach downsize today(#8cuffed)  Anticoagulation  Continue TF, speech f/u  Vanco taper  7/22  oob to chair  INR  2.47  5 mg coumadin     increased lop to 25 q8  per HF  7/23 INR today 2.64.  H&H 7.3/24 this AM.  Will repeat CBC at noon, and will send stool guaiac Patient with persistent abdominal tenderness, rate of tube feeds decreased.  No nausea/vomiting.    7/24 INR today 2.4H7H 9.1/28.6 low flow overnight /N&V  NPO resolved for capsule study mondayn Coumadin on hold

## 2021-07-24 NOTE — PROGRESS NOTE ADULT - PROBLEM SELECTOR PLAN 2
-Current speed 9200 RPM  INR 2.62 on 7/23   INR2.4 7/24t rending down coumadin on hold  - Plan to hold aspirin indefinitely given recurrent GIB.  -Continue to monitor LDH daily

## 2021-07-24 NOTE — PROGRESS NOTE ADULT - SUBJECTIVE AND OBJECTIVE BOX
RICKY JOINT  MRN#: [id number]  Subjective:  Pulmonary progress  : recurrent Acute hypoxic respiratory Failure ,aspiration pneumonia, NICM  , chart reviewed and H/O obtained radiological and Laboratory study reviewed patient Examined     65M PMH ACC/AHA stage D HF due to NICM HM2 LVAD , TV annuloplasty ring 17 as destination therapy due to severe peripheral artery disease with significant stenosis  SIADH, Depression, CKD-3 with hyperkalemia, past E. coli UTIs, driveline drainage (21) and COVID-19 (back in 2020)  He was recently seen in clinic where he complained of abdominal pain and dark stools w constipation back in May. He presents to [facility] ER today weakness and fatigue, moderate and + Black stools for three days, on coumadin secondary to warfarin use in the setting of an LVAD. Patient has required transfusions for GIB in the past. Mostly recently back in 2021 pt had anemia with dark stools. No interventions was done at that time. However Last Endoscopy was done in 2020 (negative). Today labs show patient is anemic with H/H of 4.5/16.3,. INR is 8.84 MAP in the 90s, Temp 35.1. He denies any chest pain, shortness of breath, dizziness, abd pain, nausea or vomiting. found to have  rectal bleeding underwent endoscopy ,old blood in the proximal ileum ,  develop sepsis with LL opacity given Antibiotics , Extubated , reintubated , Bronchoscopy on Zosyn for LL pneumonia  and Amiodarone S/P TV Annuloplasty , patient remain intubated on full ventilatory support .S/P multiple units of blood transfusion , remain on full ventilatory support on Precedex and propofol , new central IJ line , diarrhea C diff. +ve on po vancomycin and IV Flagyl,  mildly distended belly , fever start on cefepime 2gm q 8 hrs S/P tracheostomy .  new RT Subclavian central line continue on contact  isolation ,still diarrhea on C-diff antibiotics ENT follow up appreciated , trial of C-PAP as tolerated , , copious secretion from trach. site chest x ray left lower lobe pneumonia , tolerating trch. collar 50% FI02 still excessive secretion need pulmonary toilet , off Ancef antibiotic , no more diarrhea back on full support mechanical ventilator , chest x ray show improvement in LLL air space disease, more awake and responsive on tube feeding no more diarrhea , S/P  Ancef for bacteremia no fever ,ID follow up noted ,  no nausea or vomiting or diarrhea still very weak and tired , event note pulled NG tube now replaced , back on tube feeding ,still on po vancomycin , getting PT and OT at bed side , no plan for decannulation for now. , no more diarrhea receiving PT and OPT at bed side , minimal secretion from tracheostomy site , no SOB getting stronger , improve muscle tone patient transfer to monitor bed still on contact isolation for C-Difficel colitis on 50% FI02, NG tube clogged , and change to resume tube feeding still loose stool . H/H drop significantly require blood transfusion , most likely GI bleeding , IV heparin D/C , vomitting 200 cc of creamy color tube feeding on hold no sob         (2021 16:57)    PAST MEDICAL & SURGICAL HISTORY:  CHF (congestive heart failure)    CAD (coronary artery disease)  Depression    Pleural effusion    History of 2019 novel coronavirus disease (COVID-19)  2020    Hemorrhoids    Bleeding hemorrhoids    Peripheral arterial disease    Claudication    BPH with urinary obstruction    ACC/AHA stage D systolic heart failure  Anticoagulation goal of INR 2.0 to 2.5    Falls    Clavicle fracture    CKD (chronic kidney disease), stage III    Iron deficiency anemia    H/O epistaxis    Vertigo    GI bleed    S/P TVR (tricuspid valve repair)    S/P ventricular assist device    S/P endoscopy    OBJECTIVE:  ICU Vital Signs Last 24 Hrs  T(C): 38.2 (2021 10:00), Max: 38.5 (2021 12:00)  T(F): 100.8 (2021 10:00), Max: 101.3 (2021 12:00)  HR: 65 (2021 10:00) (61 - 69)  BP: --  BP(mean): --  ABP: 105/67 (2021 10:00) (90/54 - 113/64)  ABP(mean): 77 (2021 10:00) (63 - 77)  RR: 20 (2021 10:00) (19 - 35)  SpO2: 99% (2021 10:00) (96% - 100%)      -19 @ 07:01  -  06-20 @ 07:00  --------------------------------------------------------  IN: 2693.9 mL / OUT: 1415 mL / NET: 1278.9 mL     @ 07: @ 10:49  --------------------------------------------------------  IN: 420.8 mL / OUT: 115 mL / NET: 305.8 mL  PHYSICAL EXAM:Daily   Elderly male S/P tracheostomy   on  trach. collar 50% FI02   Daily Weight in k.4 (2021 00:00)  HEENT:     + NCAT  + EOMI  - Conjuctival edema   - Icterus   - Thrush   - ETT  + NGT/OGT  Neck:         + FROM   RT SC line  JVD     - Nodes     - Masses    + Mid-line trachea + Tracheostomy  Chest: normal findings  Lungs:          + CTA   + Rhonchi    - Rales    - Wheezing + Decreased  LT BS   - Dullness R L  Cardiac:       + S1 + S2    + RRR   - Irregular   - S3  - S4    - Murmurs   - Rub   - Hamman’s sign   Abdomen:    + BS     + Soft    + Non-tender     - Distended    - Organomegaly  - PEG  Extremities:   - Cyanosis U/L   - Clubbing  U/L  + LE/UE Edema   + Capillary refill    + Pulses   Neuro:        - Awake   -  Alert   - Confused   - Lethargic   + Sedated  + Generalized Weakness  Skin:        - Rashes    - Erythema   + Normal incisions   + IV sites intact          HOSPITAL MEDICATIONS: All mediciations reviewed and analyzed  MEDICATIONS  (STANDING):  aMIOdarone    Tablet 200 milliGRAM(s) Oral daily  chlorhexidine 0.12% Liquid 15 milliLiter(s) Oral Mucosa every 12 hours  chlorhexidine 2% Cloths 1 Application(s) Topical <User Schedule>  dexMEDEtomidine Infusion 0.5 MICROgram(s)/kG/Hr (9.81 mL/Hr) IV Continuous <Continuous>  dextrose 50% Injectable 50 milliLiter(s) IV Push every 15 minutes  heparin  Infusion 400 Unit(s)/Hr (12.5 mL/Hr) IV Continuous <Continuous>  HYDROmorphone  Injectable 0.5 milliGRAM(s) IV Push once  insulin lispro (ADMELOG) corrective regimen sliding scale   SubCutaneous every 6 hours  pantoprazole  Injectable 40 milliGRAM(s) IV Push every 12 hours  piperacillin/tazobactam IVPB.. 3.375 Gram(s) IV Intermittent every 8 hours  propofol Infusion 20 MICROgram(s)/kG/Min (9.42 mL/Hr) IV Continuous <Continuous>  sodium chloride 0.9% lock flush 3 milliLiter(s) IV Push every 8 hours  sodium chloride 0.9%. 1000 milliLiter(s) (10 mL/Hr) IV Continuous <Continuous>    MEDICATIONS  (PRN):  acetaminophen    Suspension .. 650 milliGRAM(s) Oral every 6 hours PRN Temp greater or equal to 38C (100.4F)    LABS: All Lab data reviewed and analyzed                         9.1     )-----------( 254      ( 2021 10:28 )             28.6    07-24    135  |  100  |  36<H>  ----------------------------<  137<H>  4.8   |  25  |  0.88    Ca    9.5      2021 07:23  Phos  2.9     07-24  Mg     2.0     07-24    TPro  7.7  /  Alb  3.8  /  TBili  0.6  /  DBili  x   /  AST  22  /  ALT  17  /  AlkPhos  76  07-24    Ca    9.5      2021 06:04  Phos  3.8     07-22  Mg     2.1     07-22    TPro  8.5<H>  /  Alb  4.0  /  TBili  0.2  /  DBili  x   /  AST  20  /  ALT  18  /  AlkPhos  96  07-    Ca    9.9      2021 06:47  Phos  3.8     07-22  Mg     2.1     07-    TPro  8.5<H>  /  Alb  4.0  /  TBili  0.2  /  DBili  x   /  AST  20  /  ALT  18  /  AlkPhos  96  -    Ca    10.6<H>      2021 01:00  Phos  3.8     07-20  Mg     2.0     07-20    TPro  9.5<H>  /  Alb  4.7  /  TBili  0.3  /  DBili  x   /  AST  26  /  ALT  22  /  AlkPhos  108  07-20      Ca    10.6<H>      2021 00:28  Phos  4.0     07-19  Mg     2.2     07-19    TPro  9.8<H>  /  Alb  4.9  /  TBili  0.3  /  DBili  x   /  AST  28  /  ALT  22  /  AlkPhos  114  07-19    Ca    10.7<H>      2021 01:04  Phos  3.5     07-18  Mg     2.0     07-18    TPro  9.5<H>  /  Alb  4.4  /  TBili  0.3  /  DBili  x   /  AST  26  /  ALT  23  /  AlkPhos  108  07-18    Ca    10.2      2021 01:25  Phos  3.4     07-17  Mg     1.9     07-17    TPro  9.0<H>  /  Alb  4.5  /  TBili  0.3  /  DBili  x   /  AST  28  /  ALT  21  /  AlkPhos  95  07-17                                                     PTT - ( 2021 04:52 )  PTT:45.2 sec LIVER FUNCTIONS - ( 2021 00:42 )  Alb: 3.4 g/dL / Pro: 6.7 g/dL / ALK PHOS: 213 U/L / ALT: 15 U/L / AST: 24 U/L / GGT: x           RADIOLOGY: - Reviewed and analyzed LLL  pneumonia , LVAD HM2, CT scan of abdomen reviewed result noted

## 2021-07-24 NOTE — PROVIDER CONTACT NOTE (CHANGE IN STATUS NOTIFICATION) - SITUATION
Pt vomited 200cc Tan color liquid Tube feeding Pt vomited 200cc  cream color liquid Tube feeding, Low flow alarm 2.6-2..

## 2021-07-24 NOTE — PROGRESS NOTE ADULT - ASSESSMENT
Assessment and Recommendation:   · Assessment	  Assessment and recommendation :  Recurrent Acute hypoxic respiratory Failure S/P tracheostomy on full ventilatory support alternating with trach. collar at 50% Fi02  Acute blood loss anemia S/P blood transfusion   vomiting tube feeding on hold   S/P Acute left lower lobe pneumonia   Aspiration pneumonia   Stool is  +ve for C-diff. colitis on PO vancomycin  improved   S/P  Ancef improved for bacteremia   Non ischemic cardiomyopathy continue ACE inhibitor and B-Blockers   S/P Septic shock and cardiogenic shock   Stage D systolic heart failure S/P LVAD HM2   MH2 LVAD  with  TV Annuloplasty  Severe peripheral vascular disease   S/P Anion gap metabolic acidosis  severe hyperglycemia on insulin coverage    On  Amiodarone   critical care polyneuropathy   Anemia of Acute blood Loss   S/P Small bowel bleeding   S/P blood and FFP transfusion   Chronic kidney disease stage III   NGT feeding on Hold   PT and OT at bed side   GI prophylaxis  discuss with TCV surgeon

## 2021-07-24 NOTE — PROGRESS NOTE ADULT - PROBLEM SELECTOR PLAN 3
-s/p trach 6/25 Ricky #8    plan for downsize trach today  # 7 Portex  -continue to wean trach as tolerates  - goal to decannulate

## 2021-07-24 NOTE — PROGRESS NOTE ADULT - PROBLEM SELECTOR PLAN 2
- Current speed 9200 RPM  - HOLD coumadin given concern of bleed; INR today 2.4  - Will plan to hold aspirin indefinitely given recurrent GIB.  - Continue to monitor LDH daily.

## 2021-07-24 NOTE — PROGRESS NOTE ADULT - ATTENDING COMMENTS
H/H remains stable. No further signs of active bleeding.  Hemodynamically stable.  S/p 2 u PRBC transfused yesterday.   GI following, plan for video capsule Monday.

## 2021-07-25 NOTE — PROGRESS NOTE ADULT - SUBJECTIVE AND OBJECTIVE BOX
RICKY JOINT  MRN#: [id number]  Subjective:  Pulmonary progress  : recurrent Acute hypoxic respiratory Failure ,aspiration pneumonia, NICM  , chart reviewed and H/O obtained radiological and Laboratory study reviewed patient Examined     65M PMH ACC/AHA stage D HF due to NICM HM2 LVAD , TV annuloplasty ring 17 as destination therapy due to severe peripheral artery disease with significant stenosis  SIADH, Depression, CKD-3 with hyperkalemia, past E. coli UTIs, driveline drainage (21) and COVID-19 (back in 2020)  He was recently seen in clinic where he complained of abdominal pain and dark stools w constipation back in May. He presents to [facility] ER today weakness and fatigue, moderate and + Black stools for three days, on coumadin secondary to warfarin use in the setting of an LVAD. Patient has required transfusions for GIB in the past. Mostly recently back in 2021 pt had anemia with dark stools. No interventions was done at that time. However Last Endoscopy was done in 2020 (negative). Today labs show patient is anemic with H/H of 4.5/16.3,. INR is 8.84 MAP in the 90s, Temp 35.1. He denies any chest pain, shortness of breath, dizziness, abd pain, nausea or vomiting. found to have  rectal bleeding underwent endoscopy ,old blood in the proximal ileum ,  develop sepsis with LL opacity given Antibiotics , Extubated , reintubated , Bronchoscopy on Zosyn for LL pneumonia  and Amiodarone S/P TV Annuloplasty , patient remain intubated on full ventilatory support .S/P multiple units of blood transfusion , remain on full ventilatory support on Precedex and propofol , new central IJ line , diarrhea C diff. +ve on po vancomycin and IV Flagyl,  mildly distended belly , fever start on cefepime 2gm q 8 hrs S/P tracheostomy .  new RT Subclavian central line continue on contact  isolation ,still diarrhea on C-diff antibiotics ENT follow up appreciated , trial of C-PAP as tolerated , , copious secretion from trach. site chest x ray left lower lobe pneumonia , tolerating trch. collar 50% FI02 still excessive secretion need pulmonary toilet , off Ancef antibiotic , no more diarrhea back on full support mechanical ventilator , chest x ray show improvement in LLL air space disease, more awake and responsive on tube feeding no more diarrhea , S/P  Ancef for bacteremia no fever ,ID follow up noted ,  no nausea or vomiting or diarrhea still very weak and tired , event note pulled NG tube now replaced , back on tube feeding ,still on po vancomycin , getting PT and OT at bed side , no plan for decannulation for now. , no more diarrhea receiving PT and OPT at bed side , minimal secretion from tracheostomy site , no SOB getting stronger , improve muscle tone patient transfer to monitor bed still on contact isolation for C-Difficel colitis on 50% FI02, NG tube clogged , and change to resume tube feeding still loose stool . H/H drop significantly require blood transfusion , most likely GI bleeding , IV heparin D/C , vomiting 200 cc of creamy color tube feeding on hold no sob, still melena monitor in the CTU possible capsule endoscopy , H/H is stable .         (2021 16:57)    PAST MEDICAL & SURGICAL HISTORY:  CHF (congestive heart failure)    CAD (coronary artery disease)  Depression    Pleural effusion    History of 2019 novel coronavirus disease (COVID-19)  2020    Hemorrhoids    Bleeding hemorrhoids    Peripheral arterial disease    Claudication    BPH with urinary obstruction    ACC/AHA stage D systolic heart failure  Anticoagulation goal of INR 2.0 to 2.5    Falls    Clavicle fracture    CKD (chronic kidney disease), stage III    Iron deficiency anemia    H/O epistaxis    Vertigo    GI bleed    S/P TVR (tricuspid valve repair)    S/P ventricular assist device    S/P endoscopy    OBJECTIVE:  ICU Vital Signs Last 24 Hrs  T(C): 38.2 (2021 10:00), Max: 38.5 (2021 12:00)  T(F): 100.8 (2021 10:00), Max: 101.3 (2021 12:00)  HR: 65 (2021 10:00) (61 - 69)  BP: --  BP(mean): --  ABP: 105/67 (2021 10:00) (90/54 - 113/64)  ABP(mean): 77 (2021 10:00) (63 - 77)  RR: 20 (2021 10:00) (19 - 35)  SpO2: 99% (2021 10:00) (96% - 100%)      -19 @ 07: @ 07:00  --------------------------------------------------------  IN: 2693.9 mL / OUT: 1415 mL / NET: 1278.9 mL     @ 07: @ 10:49  --------------------------------------------------------  IN: 420.8 mL / OUT: 115 mL / NET: 305.8 mL  PHYSICAL EXAM:Daily   Elderly male S/P tracheostomy   on  trach. collar 50% FI02   Daily Weight in k.4 (2021 00:00)  HEENT:     + NCAT  + EOMI  - Conjuctival edema   - Icterus   - Thrush   - ETT  + NGT/OGT  Neck:         + FROM   RT SC line  JVD     - Nodes     - Masses    + Mid-line trachea + Tracheostomy  Chest: normal findings  Lungs:          + CTA   + Rhonchi    - Rales    - Wheezing + Decreased  LT BS   - Dullness R L  Cardiac:       + S1 + S2    + RRR   - Irregular   - S3  - S4    - Murmurs   - Rub   - Hamman’s sign   Abdomen:    + BS     + Soft    + Non-tender     - Distended    - Organomegaly  - PEG  Extremities:   - Cyanosis U/L   - Clubbing  U/L  + LE/UE Edema   + Capillary refill    + Pulses   Neuro:        - Awake   -  Alert   - Confused   - Lethargic   + Sedated  + Generalized Weakness  Skin:        - Rashes    - Erythema   + Normal incisions   + IV sites intact          HOSPITAL MEDICATIONS: All mediciations reviewed and analyzed  MEDICATIONS  (STANDING):  aMIOdarone    Tablet 200 milliGRAM(s) Oral daily  chlorhexidine 0.12% Liquid 15 milliLiter(s) Oral Mucosa every 12 hours  chlorhexidine 2% Cloths 1 Application(s) Topical <User Schedule>  dexMEDEtomidine Infusion 0.5 MICROgram(s)/kG/Hr (9.81 mL/Hr) IV Continuous <Continuous>  dextrose 50% Injectable 50 milliLiter(s) IV Push every 15 minutes  heparin  Infusion 400 Unit(s)/Hr (12.5 mL/Hr) IV Continuous <Continuous>  HYDROmorphone  Injectable 0.5 milliGRAM(s) IV Push once  insulin lispro (ADMELOG) corrective regimen sliding scale   SubCutaneous every 6 hours  pantoprazole  Injectable 40 milliGRAM(s) IV Push every 12 hours  piperacillin/tazobactam IVPB.. 3.375 Gram(s) IV Intermittent every 8 hours  propofol Infusion 20 MICROgram(s)/kG/Min (9.42 mL/Hr) IV Continuous <Continuous>  sodium chloride 0.9% lock flush 3 milliLiter(s) IV Push every 8 hours  sodium chloride 0.9%. 1000 milliLiter(s) (10 mL/Hr) IV Continuous <Continuous>    MEDICATIONS  (PRN):  acetaminophen    Suspension .. 650 milliGRAM(s) Oral every 6 hours PRN Temp greater or equal to 38C (100.4F)    LABS: All Lab data reviewed and analyzed                        7.7    13.52 )-----------( 239      ( 2021 06:11 )             25.1   07-    138  |  102  |  27<H>  ----------------------------<  103<H>  4.0   |  26  |  0.89    Ca    9.5      2021 06:11  Phos  2.9     07-24  Mg     2.0     07-24    TPro  7.5  /  Alb  3.8  /  TBili  0.4  /  DBili  x   /  AST  22  /  ALT  17  /  AlkPhos  72  -      Ca    9.5      2021 07:23  Phos  2.9     07-24  Mg     2.0     07-24    TPro  7.7  /  Alb  3.8  /  TBili  0.6  /  DBili  x   /  AST  22  /  ALT  17  /  AlkPhos  76  07-24    Ca    9.5      2021 06:04  Phos  3.8     07-22  Mg     2.1     07-    TPro  8.5<H>  /  Alb  4.0  /  TBili  0.2  /  DBili  x   /  AST  20  /  ALT  18  /  AlkPhos  96  07-    Ca    9.9      2021 06:47  Phos  3.8     07-22  Mg     2.1     07-    TPro  8.5<H>  /  Alb  4.0  /  TBili  0.2  /  DBili  x   /  AST  20  /  ALT  18  /  AlkPhos  96  07-    Ca    10.6<H>      2021 01:00  Phos  3.8     07-20  Mg     2.0     07-    TPro  9.5<H>  /  Alb  4.7  /  TBili  0.3  /  DBili  x   /  AST  26  /  ALT  22  /  AlkPhos  108  07-      Ca    10.6<H>      2021 00:28  Phos  4.0     07-19  Mg     2.2     07-    TPro  9.8<H>  /  Alb  4.9  /  TBili  0.3  /  DBili  x   /  AST  28  /  ALT  22  /  AlkPhos  114  07-                                                     PTT - ( 2021 04:52 )  PTT:45.2 sec LIVER FUNCTIONS - ( 2021 00:42 )  Alb: 3.4 g/dL / Pro: 6.7 g/dL / ALK PHOS: 213 U/L / ALT: 15 U/L / AST: 24 U/L / GGT: x           RADIOLOGY: - Reviewed and analyzed LLL  pneumonia , LVAD HM2, CT scan of abdomen reviewed result noted    No

## 2021-07-25 NOTE — PROGRESS NOTE ADULT - SUBJECTIVE AND OBJECTIVE BOX
Subjective " Hello " ATC    VITAL SIGNS    Telemetry:  NSR 89    Vital Signs Last 24 Hrs  T(C): 36.7 (21 @ 07:07), Max: 36.8 (21 @ 02:00)  T(F): 98 (21 @ 07:07), Max: 98.2 (21 @ 02:00)  HR: 82 (21 @ 07:07) (82 - 99)  BP: 82/64 (21 @ 07:07) (82/64 - 95/69)  RR: 20 (21 @ 11:39) (18 - 20)  SpO2: 100% (21 @ 11:39) (94% - 100%)            @ 07:01  -   @ 07:00  --------------------------------------------------------  IN: 625 mL / OUT: 575 mL / NET: 50 mL     @ 07:01  -   @ 12:05  --------------------------------------------------------  IN: 200 mL / OUT: 0 mL / NET: 200 mL  Daily Weight in k (2021 08:00)  9200  3.8  4.9  4.6                          7.7    13.52 )-----------( 239      ( 2021 06:11 )             25.1       07-    138  |  102  |  27<H>  ----------------------------<  103<H>  4.0   |  26  |  0.89    Ca    9.5      2021 06:11  Phos  2.9     07-24  Mg     2.0     07-24    TPro  7.5  /  Alb  3.8  /  TBili  0.4  /  DBili  x   /  AST  22  /  ALT  17  /  AlkPhos  72  07-25      MEDICATIONS  (STANDING):  aMIOdarone    Tablet 200 milliGRAM(s) Oral daily  clonazePAM  Tablet 0.5 milliGRAM(s) Oral at bedtime  dextrose 5% + sodium chloride 0.45%. 1000 milliLiter(s) (50 mL/Hr) IV Continuous <Continuous>  insulin lispro (ADMELOG) corrective regimen sliding scale   SubCutaneous every 6 hours  metoprolol tartrate 25 milliGRAM(s) Oral three times a day  pantoprazole  Injectable 40 milliGRAM(s) IV Push two times a day  pantoprazole Infusion 8 mG/Hr (10 mL/Hr) IV Continuous <Continuous>  vancomycin    Solution 125 milliGRAM(s) Oral daily    MEDICATIONS  (PRN):  acetaminophen    Suspension .. 650 milliGRAM(s) Oral every 6 hours PRN Mild Pain (1 - 3)  hydrALAZINE 25 milliGRAM(s) Oral every 8 hours PRN MAP greater than 85  simethicone 80 milliGRAM(s) Chew every 8 hours PRN Dyspepsia      CAPILLARY BLOOD GLUCOSE      POCT Blood Glucose.: 110 mg/dL (2021 12:04)  POCT Blood Glucose.: 109 mg/dL (2021 05:36)  POCT Blood Glucose.: 125 mg/dL (2021 22:00)  POCT Blood Glucose.: 136 mg/dL (2021 17:40)                           PHYSICAL EXAM  Neurology: alert and oriented x 3, nonfocal, no gross deficits    CV :Lvad sounds   Drive line CDI stable anchored  Lungs: B/l breath sounds ATC#7     Abdomen:  tender, nondistended, positive bowel sounds, + BM Melena    :  Voids             Extremities:    equal strength throughout   b/l le warm well perfused                                        Physical Therapy Rec:   Home  [  ]   Home w/ PT  [  ]  Rehab  [ x ]    Discussed with Cardiothoracic Team at AM rounds.

## 2021-07-25 NOTE — PROGRESS NOTE ADULT - PROBLEM SELECTOR PLAN 3
- No active bleeding seen on past enteroscopy  - more recently, downtrending Hgb despite therapeutic INR   - s/p 2U PRBC 7/23 for Hgb 6.7 with appropriate response  - drop in Hgb this AM to 7.7 from 9.1; to repeat CBC at noon and if accurate, would consider imaging ABD given ongoing LLQ tenderness to evaluate for possible RP bleed.   - positive stool guaiac 7/23  - plan for repeat VCE 7/26  - Continue PPI  - transfuse PRN - No active bleeding seen on past enteroscopy  - more recently, downtrending Hgb despite therapeutic INR   - s/p 2U PRBC 7/23 for Hgb 6.7 with appropriate response  - drop in Hgb this AM to 7.7 from 9.1; to repeat H/H at noon  - positive stool guaiac 7/23  - plan for repeat VCE 7/26  - Continue PPI  - transfuse PRN

## 2021-07-25 NOTE — PROGRESS NOTE ADULT - ATTENDING COMMENTS
Agree with above. Monitor H/H for now, will determine whether repeat endoscopic evaluation is needed based on H/H trend, as his bleeding is likely from intermittent small bowel bleeding which has stopped on its own previously.

## 2021-07-25 NOTE — PROGRESS NOTE ADULT - SUBJECTIVE AND OBJECTIVE BOX
Interval Events:   - Patient had one episode of melena this AM associated with Hb drop 9.1-->7.7  - Patient endorses some generalized abdominal discomfort  - No nausea or vomiting  - LVAD with low flow state, not significantly changed in recent days    Allergies:  No Known Allergies      Hospital Medications:  acetaminophen    Suspension .. 650 milliGRAM(s) Oral every 6 hours PRN  aMIOdarone    Tablet 200 milliGRAM(s) Oral daily  clonazePAM  Tablet 0.5 milliGRAM(s) Oral at bedtime  dextrose 5% + sodium chloride 0.45%. 1000 milliLiter(s) IV Continuous <Continuous>  hydrALAZINE 25 milliGRAM(s) Oral every 8 hours PRN  insulin lispro (ADMELOG) corrective regimen sliding scale   SubCutaneous every 6 hours  metoprolol tartrate 25 milliGRAM(s) Oral three times a day  pantoprazole Infusion 8 mG/Hr IV Continuous <Continuous>  simethicone 80 milliGRAM(s) Chew every 8 hours PRN  vancomycin    Solution 125 milliGRAM(s) Oral daily      PMHX/PSHX:  No pertinent past medical history    CHF (congestive heart failure)    CAD (coronary artery disease)    Depression    Pleural effusion    History of 2019 novel coronavirus disease (COVID-19)    Hemorrhoids    Bleeding hemorrhoids    Peripheral arterial disease    Claudication    BPH with urinary obstruction    ACC/AHA stage D systolic heart failure    Anticoagulation goal of INR 2.0 to 2.5    Falls    Clavicle fracture    CKD (chronic kidney disease), stage III    Iron deficiency anemia    H/O epistaxis    Vertigo    GI bleed    No significant past surgical history    S/P TVR (tricuspid valve repair)    S/P ventricular assist device    S/P endoscopy        Family history:  No pertinent family history in first degree relatives        ROS:   As per HPI, otherwise grossly normal      PHYSICAL EXAM:   Vital Signs:  Vital Signs Last 24 Hrs  T(C): 36.7 (2021 12:40), Max: 36.8 (2021 02:00)  T(F): 98 (2021 12:40), Max: 98.2 (2021 02:00)  HR: 94 (2021 15:00) (82 - 98)  BP: 92/70 (2021 15:00) (82/64 - 96/64)  BP(mean): 77 (2021 15:00) (68 - 77)  RR: 31 (2021 15:00) (17 - 31)  SpO2: 99% (2021 15:00) (94% - 100%)  Daily     Daily Weight in k.2 (2021 11:00)      21 @ 07:  -  21 @ 07:00  --------------------------------------------------------  IN: 625 mL / OUT: 575 mL / NET: 50 mL    21 @ 07:  -  21 @ 15:48  --------------------------------------------------------  IN: 330 mL / OUT: 300 mL / NET: 30 mL        GENERAL: no acute distress  NEURO: nonverbal  HEENT: anicteric sclera, no conjunctival pallor appreciated  CHEST: no respiratory distress, no accessory muscle use  CARDIAC: regular rate, +S1/S2  ABDOMEN: soft, nondistended, nontender, no rebound or guarding  EXTREMITIES: warm, well perfused, no edema  SKIN: no lesions noted    LABS:                        7.7    13.52 )-----------( 239      ( 2021 06:11 )             25.1     Mean Cell Volume: 94.0 fl (21 @ 06:11)        138  |  102  |  27<H>  ----------------------------<  103<H>  4.0   |  26  |  0.89    Ca    9.5      2021 06:11  Phos  2.9       Mg     2.0         TPro  7.5  /  Alb  3.8  /  TBili  0.4  /  DBili  x   /  AST  22  /  ALT  17  /  AlkPhos  72      LIVER FUNCTIONS - ( 2021 06:11 )  Alb: 3.8 g/dL / Pro: 7.5 g/dL / ALK PHOS: 72 U/L / ALT: 17 U/L / AST: 22 U/L / GGT: x           PT/INR - ( 2021 06:11 )   PT: 30.6 sec;   INR: 2.67 ratio                                     7.7    13.52 )-----------( 239      ( 2021 06:11 )             25.1                         9.1    25.01 )-----------( 254      ( 2021 10:28 )             28.6                         9.6    16.23 )-----------( 246      ( 2021 23:25 )             29.7                         6.7    13.83 )-----------( 263      ( 2021 12:23 )             22.0                         7.3    13.60 )-----------( 278      ( 2021 06:04 )             24.4       Imaging:           Interval Events:   - Patient had one episode of melena this AM associated with Hb drop 9.1-->7.7  - Patient endorses some generalized abdominal discomfort  - No nausea or vomiting  - LVAD with low flow state, not significantly changed in recent days    Allergies:  No Known Allergies      Hospital Medications:  acetaminophen    Suspension .. 650 milliGRAM(s) Oral every 6 hours PRN  aMIOdarone    Tablet 200 milliGRAM(s) Oral daily  clonazePAM  Tablet 0.5 milliGRAM(s) Oral at bedtime  dextrose 5% + sodium chloride 0.45%. 1000 milliLiter(s) IV Continuous <Continuous>  hydrALAZINE 25 milliGRAM(s) Oral every 8 hours PRN  insulin lispro (ADMELOG) corrective regimen sliding scale   SubCutaneous every 6 hours  metoprolol tartrate 25 milliGRAM(s) Oral three times a day  pantoprazole Infusion 8 mG/Hr IV Continuous <Continuous>  simethicone 80 milliGRAM(s) Chew every 8 hours PRN  vancomycin    Solution 125 milliGRAM(s) Oral daily      PMHX/PSHX:  No pertinent past medical history    CHF (congestive heart failure)    CAD (coronary artery disease)    Depression    Pleural effusion    History of 2019 novel coronavirus disease (COVID-19)    Hemorrhoids    Bleeding hemorrhoids    Peripheral arterial disease    Claudication    BPH with urinary obstruction    ACC/AHA stage D systolic heart failure    Anticoagulation goal of INR 2.0 to 2.5    Falls    Clavicle fracture    CKD (chronic kidney disease), stage III    Iron deficiency anemia    H/O epistaxis    Vertigo    GI bleed    No significant past surgical history    S/P TVR (tricuspid valve repair)    S/P ventricular assist device    S/P endoscopy        Family history:  No pertinent family history in first degree relatives        ROS:   As per HPI, otherwise grossly normal      PHYSICAL EXAM:   Vital Signs:  Vital Signs Last 24 Hrs  T(C): 36.7 (2021 12:40), Max: 36.8 (2021 02:00)  T(F): 98 (2021 12:40), Max: 98.2 (2021 02:00)  HR: 94 (2021 15:00) (82 - 98)  BP: 92/70 (2021 15:00) (82/64 - 96/64)  BP(mean): 77 (2021 15:00) (68 - 77)  RR: 31 (2021 15:00) (17 - 31)  SpO2: 99% (2021 15:00) (94% - 100%)  Daily     Daily Weight in k.2 (2021 11:00)      21 @ 07:  -  21 @ 07:00  --------------------------------------------------------  IN: 625 mL / OUT: 575 mL / NET: 50 mL    21 @ 07:  -  21 @ 15:48  --------------------------------------------------------  IN: 330 mL / OUT: 300 mL / NET: 30 mL        GENERAL: no acute distress  NEURO: Alert and oriented  HEENT: anicteric sclera, no conjunctival pallor appreciated; s/p trach  CHEST: no respiratory distress, no accessory muscle use  CARDIAC: regular rate, +S1/S2  ABDOMEN: soft, nondistended, nontender, no rebound or guarding  EXTREMITIES: warm, well perfused, no edema  SKIN: no lesions noted    LABS:                        7.7    13.52 )-----------( 239      ( 2021 06:11 )             25.1     Mean Cell Volume: 94.0 fl (21 @ 06:11)        138  |  102  |  27<H>  ----------------------------<  103<H>  4.0   |  26  |  0.89    Ca    9.5      2021 06:11  Phos  2.9     -  Mg     2.0         TPro  7.5  /  Alb  3.8  /  TBili  0.4  /  DBili  x   /  AST  22  /  ALT  17  /  AlkPhos  72  0725    LIVER FUNCTIONS - ( 2021 06:11 )  Alb: 3.8 g/dL / Pro: 7.5 g/dL / ALK PHOS: 72 U/L / ALT: 17 U/L / AST: 22 U/L / GGT: x           PT/INR - ( 2021 06:11 )   PT: 30.6 sec;   INR: 2.67 ratio                                     7.7    13.52 )-----------( 239      ( 2021 06:11 )             25.1                         9.1    25.01 )-----------( 254      ( 2021 10:28 )             28.6                         9.6    16.23 )-----------( 246      ( 2021 23:25 )             29.7                         6.7    13.83 )-----------( 263      ( 2021 12:23 )             22.0                         7.3    13.60 )-----------( 278      ( 2021 06:04 )             24.4

## 2021-07-25 NOTE — PROGRESS NOTE ADULT - ASSESSMENT
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 coli UTIs, driveline drainage (1/7/21) and COVID-19 (back in April 2020)  He was recently seen in clinic where he complained of abdominal pain and dark stools w constipation back in May. He presents to [facility] ER today weakness and fatigue, moderate and + Black stools for three days, on coumadin secondary to warfarin use in the setting of an LVAD. Patient has required transfusions for GIB in the past. Mostly recently back in jan 2021 pt had anemia with dark stools. No interventions was done at that time. However Last Endoscopy was done in July 2020 (negative). Today labs show patient is anemic with H/H of 4.5/16.3,. INR is 8.84 MAP in the 90s, Temp 35.1. He denies any chest pain, shortness of breath, dizziness, abd pain, nausea or vomiting.      Surgery/Hospital Course:  6/14 admit for melena w/ anemia, INR 8.84   6/15 Capsul study (+) for small bowel bleed, balloon endoscopy (old blood in prox ileum); post EGD - septic w/ L opacity, re-intubated for concern for aspiration, TTE (Mod MR, decrease biV w/ interventricular septum boweing towards R)  6/17 bronch   6/20 +C Diff   6/22 TC   6/22 CT C/A/P: Fluid filled colon which may be 2/2 rapid transit. Small bilateral pleffs with associates. Compressive atelectasis New ISABELLE & LLL  parenchymal opacities, suspicious for pneumonia. 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   6/25 #8 Shiley trach at bedside   6/28 CPAP trials   7/1 LVAD speed increased to 9200  7/2 Bronch; Central line dc'ed  7/20 TC since 7/7, continue as tolerated. Patient transferred to SDU.   7/21 Cont PT, no trach downsize today(#8cuffed)  Anticoagulation  Continue TF, speech f/u  Vanco taper  7/22  oob to chair  INR  2.47  5 mg coumadin     increased lop to 25 q8  per HF  7/23 INR today 2.64.  H&H 7.3/24 this AM.  Will repeat CBC at noon, and will send stool guaiac Patient with persistent abdominal tenderness, rate of tube feeds decreased.  No nausea/vomiting.    7/24 INR today 2.4H&H 9.1/28.6 low flow overnight /N&V  NPO resolved for capsule study mondayn Coumadin on hold refusing Tube feeds on D5 1/2 normal  @50 cc/hr  7/25 INR 2.69  H&H 7.7/25.1 refusing Tube feeds on D5 1/2 normal  @50 cc/hr. This am + BM Anusha Oneill   aware-  PRBC x1  GI team consulted -  NPO  plan on study in am-  D/w Dr Cadet Patient  to return to CTU for further management.   CM attempted to call pts mother, to obtain information on how pt was functioning prior to admission and to inquire if pt has any POA/Advanced Directive  No answer at mother's phone number and no voicemail set up, CM will attempt to contact mother at another time

## 2021-07-25 NOTE — PROGRESS NOTE ADULT - SUBJECTIVE AND OBJECTIVE BOX
Subjective:  - ongoing LLQ discomfort  - refusing NGT feeds due to ABD discomfort; placed on D5 1/2 NS infusion  - drop in Hgb to 7.7 from 9.1 without reports of melena/hematochezia per staff    Medications:  acetaminophen    Suspension .. 650 milliGRAM(s) Oral every 6 hours PRN  aMIOdarone    Tablet 200 milliGRAM(s) Oral daily  clonazePAM  Tablet 0.5 milliGRAM(s) Oral at bedtime  dextrose 5% + sodium chloride 0.45%. 1000 milliLiter(s) IV Continuous <Continuous>  hydrALAZINE 25 milliGRAM(s) Oral every 8 hours PRN  insulin lispro (ADMELOG) corrective regimen sliding scale   SubCutaneous every 6 hours  metoprolol tartrate 25 milliGRAM(s) Oral three times a day  pantoprazole  Injectable 40 milliGRAM(s) IV Push two times a day  simethicone 80 milliGRAM(s) Chew every 8 hours PRN  vancomycin    Solution 125 milliGRAM(s) Oral daily      Physical Exam:    Vitals:  Vital Signs Last 24 Hours  T(C): 36.7 (21 @ 07:07), Max: 36.8 (21 @ 02:00)  HR: 82 (21 @ 07:07) (82 - 99)  BP: 82/64 (21 @ 07:07) (82/64 - 95/69)  RR: 18 (21 @ 07:07) (18 - 20)  SpO2: 100% (21 @ 07:07) (94% - 100%)    Weight in k ( @ 08:00)    I&O's Summary    2021 07:  -  2021 07:00  --------------------------------------------------------  IN: 625 mL / OUT: 575 mL / NET: 50 mL    2021 07:01  -  2021 10:39  --------------------------------------------------------  IN: 200 mL / OUT: 0 mL / NET: 200 mL    Appearance: No distress laying flat in bed  HEENT: EOMI  Neck: JVP does not appear elevated   Cardiovascular: +LVAD hum  Respiratory: Clear to auscultation bilaterally  Gastrointestinal: Soft, LLQ tender to palpation	  Skin: No cyanosis	  Neurologic: Non-focal  Extremities: No LE edema  Psychiatry: A & O x 3, Mood & affect appropriate    LVAD Interrogation:  Pump Speed: 9200  Pump Flow: 4.5  Pulse Index: 6.3  Pump Power: 5.1  VAD Events: few low flow alarms this morning with lowest flow of 2.4 LPM   Driveline evaluation: C/D/I  Programming Changes: No changes made        Labs:                        7.7    13.52 )-----------( 239      ( 2021 06:11 )             25.1     07    138  |  102  |  27<H>  ----------------------------<  103<H>  4.0   |  26  |  0.89    Ca    9.5      2021 06:11  Phos  2.9     07-  Mg     2.0         TPro  7.5  /  Alb  3.8  /  TBili  0.4  /  DBili  x   /  AST  22  /  ALT  17  /  AlkPhos  72      PT/INR - ( 2021 06:11 )   PT: 30.6 sec;   INR: 2.67 ratio                   Lactate Dehydrogenase, Serum: 237 U/L ( @ 06:11)  Lactate Dehydrogenase, Serum: 260 U/L ( @ 07:23)  Lactate Dehydrogenase, Serum: 234 U/L ( @ 06:04)

## 2021-07-25 NOTE — PROGRESS NOTE ADULT - ASSESSMENT
Assessment and Recommendation:   · Assessment	  Assessment and recommendation :  Recurrent Acute hypoxic respiratory Failure S/P tracheostomy on full ventilatory support alternating with trach. collar at 50% Fi02  Acute blood loss anemia S/P blood transfusion   vomiting tube feeding on hold   recurrent rectal bleeding capsule endoscopy   S/P Acute left lower lobe pneumonia   Aspiration pneumonia   Stool is  +ve for C-diff. colitis on PO vancomycin  improved   S/P  Ancef improved for bacteremia   Non ischemic cardiomyopathy continue ACE inhibitor and B-Blockers   S/P Septic shock and cardiogenic shock   Stage D systolic heart failure S/P LVAD HM2   MH2 LVAD  with  TV Annuloplasty  Severe peripheral vascular disease   S/P Anion gap metabolic acidosis  severe hyperglycemia on insulin coverage    On  Amiodarone   critical care polyneuropathy   Anemia of Acute blood Loss   S/P Small bowel bleeding   S/P blood and FFP transfusion   Chronic kidney disease stage III   NGT feeding on Hold   PT and OT at bed side   GI prophylaxis with PPI   discuss with TCV surgeon

## 2021-07-25 NOTE — PROGRESS NOTE ADULT - SUBJECTIVE AND OBJECTIVE BOX
RICKY HAMPTON  MRN-[id number]  Patient is a 66y old  Male who presents with a chief complaint of Anemia, Supratherapeutic INR, Dark Stools (10 Aug 2021 18:27)    HPI:  65M PMH ACC/AHA stage D HF due to NICM HM2 LVAD , TV annuloplasty ring 17 as destination therapy due to severe peripheral artery disease with significant stenosis  SIADH, Depression, CKD-3 with hyperkalemia, past E. coli UTIs, driveline drainage (21) and COVID-19 (back in 2020)  He was recently seen in clinic where he complained of abdominal pain and dark stools w constipation back in May. He presents to [facility] ER today weakness and fatigue, moderate and + Black stools for three days, on coumadin secondary to warfarin use in the setting of an LVAD. Patient has required transfusions for GIB in the past. Mostly recently back in 2021 pt had anemia with dark stools. No interventions was done at that time. However Last Endoscopy was done in 2020 (negative). Today labs show patient is anemic with H/H of 4.5/16.3,. INR is 8.84 MAP in the 90s, Temp 35.1. He denies any chest pain, shortness of breath, dizziness, abd pain, nausea or vomiting.     (2021 16:57)      Surgery/Hospital course:   admit for melena w/ anemia, INR 8.84   6/15 Capsul study (+) for small bowel bleed, balloon endoscopy (old blood in prox ileum); post EGD - septic w/ L opacity, re-intubated for concern for aspiration, TTE (Mod MR, decrease biV w/ interventricular septum boweing towards R)   bronch    +C Diff    TC    CT C/A/P: Fluid filled colon which may be 2/2 rapid transit. Small bilateral pleffs with associates. Compressive atelectasis New ISABELLE & LLL  parenchymal opacities, suspicious for pneumonia. 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    #8 Shiley trach at bedside    CPAP trials    LVAD speed increased to 9200   Bronch; Central line dc'ed   TC since , continue as tolerated. Patient transferred to SDU.    Cont PT, no trach downsize today(#8cuffed)  Anticoagulation  Continue TF, speech f/u  Vanco taper    oob to chair  INR  2.47  5 mg coumadin     increased lop to 25 q8  per HF   INR today 2.64.  H&H 7.3/24 this AM.  Will repeat CBC at noon, and will send stool guaiac Patient with persistent abdominal tenderness, rate of tube feeds decreased.  No nausea/vomiting.     INR today 2.4H&H 9.1/28.6 low flow overnight /N&V  NPO resolved for capsule study  Coumadin on hold refusing Tube feeds on D5  normal  @50 cc/hr   INR 2.69  H&H 7..1 refusing Tube feeds on D5 /2 normal  @50 cc/hr. This am + BM Anusha Oneill   aware- PRBC x1  GI team consulted -  NPO  plan on study in am-  D/w Dr Cadet Patient  to return to CTU for further management.    REVIEW OF SYSTEMS:  not obtrainable    Physical Exam:  Gen:  Awake, alert   CNS: non focal 	  Neck: + tracheostomy  RES : clear , no wheezing               CVS: HMII LVAD  Abd: Soft, non-distended. Bowel sounds present.  Skin: No rash.  Ext:  no edema, A Line  PSY:  ============================I/O===========================     @ 07:  -   @ 12:05  --------------------------------------------------------  IN: 200 mL / OUT: 0 mL / NET: 200 mL  Daily Weight in k (2021 08:00)  9200  3.8  4.9  4.6    ============================ LABS =========================                          7.7    13.52 )-----------( 239      ( 2021 06:11 )             25.1           138  |  102  |  27<H>  ----------------------------<  103<H>  4.0   |  26  |  0.89    Ca    9.5      2021 06:11  Phos  2.9       Mg     2.0         TPro  7.5  /  Alb  3.8  /  TBili  0.4  /  DBili  x   /  AST  22  /  ALT  17  /  AlkPhos  72        ======================Micro/Rad/Cardio=================  Culture: Reviewed   CXR: Reviewed  Echo:Reviewed  ======================================================  PAST MEDICAL & SURGICAL HISTORY:  CHF (congestive heart failure)    CAD (coronary artery disease)    Depression    Pleural effusion    History of 2019 novel coronavirus disease (COVID-19)  2020    Hemorrhoids    Bleeding hemorrhoids    Peripheral arterial disease    Claudication    BPH with urinary obstruction    ACC/AHA stage D systolic heart failure    Anticoagulation goal of INR 2.0 to 2.5    Falls    Clavicle fracture    CKD (chronic kidney disease), stage III    Iron deficiency anemia    H/O epistaxis    Vertigo    GI bleed    S/P TVR (tricuspid valve repair)    S/P ventricular assist device    S/P endoscopy      ====================ASSESMENT ==============  Stage D Nonischemic Cardiomyopathy, Status Post HM2 on 2017    Cardiogenic shock  Hemodynamic instability   Acute hypoxemic respiratory failure  GI bleed , Status Post Enteroscopy   Anemia, in setting of melena   Chronic Kidney Disease  Stress hyperglycemia   C.diff positive on    Hypovolemic shock  Hyponatremia   Leukocytosis    Plan:  MEDICATIONS  (STANDING):  aMIOdarone    Tablet 200 milliGRAM(s) Oral daily  clonazePAM  Tablet 0.5 milliGRAM(s) Oral at bedtime  dextrose 5% + sodium chloride 0.45%. 1000 milliLiter(s) (50 mL/Hr) IV Continuous <Continuous>  insulin lispro (ADMELOG) corrective regimen sliding scale   SubCutaneous every 6 hours  metoprolol tartrate 25 milliGRAM(s) Oral three times a day  pantoprazole  Injectable 40 milliGRAM(s) IV Push two times a day  pantoprazole Infusion 8 mG/Hr (10 mL/Hr) IV Continuous <Continuous>  vancomycin    Solution 125 milliGRAM(s) Oral daily    MEDICATIONS  (PRN):  acetaminophen    Suspension .. 650 milliGRAM(s) Oral every 6 hours PRN Mild Pain (1 - 3)  hydrALAZINE 25 milliGRAM(s) Oral every 8 hours PRN MAP greater than 85  simethicone 80 milliGRAM(s) Chew every 8 hours PRN Dyspepsia    ====================== NEUROLOGY=====================  Nonfocal, continue to monitor neuro status per ICU protocol.   Tylenol PRN for analgesia   C/w clonazepam for agitation     acetaminophen    Suspension .. 650 milliGRAM(s) Oral every 6 hours PRN Mild Pain (1 - 3)  clonazePAM  Tablet 0.5 milliGRAM(s) Oral at bedtime  ==================== RESPIRATORY======================  full vent support via tracheostomy    Continue close monitoring of respiratory rate and breathing pattern, following of ABG’s with A-line monitoring, continuous pulse oximetry monitoring.     ====================CARDIOVASCULAR==================  Stage D Nonischemic Cardiomyopathy, Status Post HM2 on 2017; LVAD settings 9200 with flow of 4.4  Echo 6/15: severe global Left ventricular systolic dysfunction with HM2 in place, decreased Right ventricular systolic function, interventricular septums bows slightly to right, otherwise grossly similar to prior.   Invasive hemodynamic monitoring, assess perfusion indices.   Continue rate control with Amiodarone   BP control  with Hydralazine, and lopressor (now on hold in setting of pressor requirements- on av, switched over to vaso)         aMIOdarone    Tablet 200 milliGRAM(s) Oral daily  hydrALAZINE 25 milliGRAM(s) Oral every 8 hours PRN MAP greater than 85  metoprolol tartrate 25 milliGRAM(s) Oral three times a day    ===================HEMATOLOGIC/ONC ===================  Anemia due to acute blood loss associated with upper GI bleed. s/p 1prbc and 1 ffp today (inr 3.3 now 2.8)   Thrombocytopenia   H/H 7.8/24.6  Continue to monitor hemoglobin and hematocrit levels.   ===================== RENAL =========================  Continue to monitor I/Os, BUN/Creatinine, and urine output.   Goal net negative fluid balance. Replete lytes PRN. Keep K> 4 and Mg >2.     ==================== GASTROINTESTINAL===================  Continue Protonix for stress ulcer prophylaxis.     dextrose 5% + sodium chloride 0.45%. 1000 milliLiter(s) (50 mL/Hr) IV Continuous <Continuous>  pantoprazole Infusion 8 mG/Hr (10 mL/Hr) IV Continuous <Continuous>  simethicone 80 milliGRAM(s) Chew every 8 hours PRN Dyspepsia  =======================    ENDOCRINE  =====================  A1c 5.6   Hyperglycemia, glycemic control with Admelog sliding scale   Monitor blood glucose levels.     insulin lispro (ADMELOG) corrective regimen sliding scale   SubCutaneous every 6 hours  ========================INFECTIOUS DISEASE================  Febrile, Leukocytosis w/ WBC rising from 17.85 --> 20.33  Monitor temperature and trend WBC.   : C.Diff positive requiring vancomycin   vancomycin    Solution 125 milliGRAM(s) Oral daily    discussed with ctu team

## 2021-07-25 NOTE — PROGRESS NOTE ADULT - PROBLEM SELECTOR PLAN 2
- Current speed 9200 RPM  - HOLD coumadin given concern of bleed; INR today 2.6  - Will plan to hold aspirin indefinitely given recurrent GIB.  - Continue to monitor LDH daily

## 2021-07-25 NOTE — PROGRESS NOTE ADULT - ASSESSMENT
Mr. Vic Quispe initially admitted for melena and anemia in the setting of supratherapeutic INR.  GI reconsulted for worsening anemia.    # Acute blood loss anemia and melena - hemodynamically unchanged. Likely represents recurrent GI bleed. Initial melena workup lead to capsule endoscopy on 6/14/2021 which revealed active bleeding in small bowel.  Single balloon 6/15/2021 revealed old blood in proximal ileum. 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  Plan for repeat capsule endoscopy, with further studies pending results and clinical course.  # Recent melena with presumed small bowel source  # LVAD on anticoagulation with coumadin  # Trach    Recommendations:  - Possible endoscopically placed capsule endoscopy tomorrow  - NPO midnight  - PPI 40mg IVBID  - trend vitals, CBC, and monitor for clinical signs of bleeding  - transfuse for goal hemoglobin >/= 7.0  - Cardiology managing anticoagulation, however will consider further endoscopy/enteroscopy pending clinical course and results of repeat capsule endoscopy    Thank you for involving us in the care of this patient. Please reach out if any further questions.    Pj Trevino, PGY-5  GI Fellow    Available on [employer] Teams  Pager [phone] ([facility]) or [phone] ([facility])  After 5PM/Weekends, please contact the on-call GI fellow: [phone]  Available through Microsoft Teams   Mr. Vic Quispe initially admitted for melena and anemia in the setting of supratherapeutic INR.  GI reconsulted for worsening anemia.    # Acute blood loss anemia and melena - hemodynamically unchanged. Likely represents recurrent GI bleed. Initial melena workup lead to capsule endoscopy on 6/14/2021 which revealed active bleeding in small bowel.  Single balloon 6/15/2021 revealed old blood in proximal ileum. 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  Plan for repeat capsule endoscopy, with further studies pending results and clinical course.  # Recent melena with presumed small bowel source  # LVAD on anticoagulation with coumadin  # Trach    Recommendations:  - Possible endoscopically placed capsule endoscopy tomorrow  - NPO midnight  - PPI 40mg IVBID  - trend vitals, CBC, and monitor for clinical signs of bleeding  - transfuse for goal hemoglobin >/= 7.0  - Cardiology managing anticoagulation, however will consider further endoscopy/enteroscopy pending clinical course and results of repeat capsule endoscopy    Discussed plan with CTU attending Dr. Oneill and GI attending Dr. Lau.     Thank you for involving us in the care of this patient. Please reach out if any further questions.    Pj Trevino, PGY-5  GI Fellow    Available on [employer] Teams  Pager [phone] ([facility]) or [phone] ([facility])  After 5PM/Weekends, please contact the on-call GI fellow: [phone]  Available through Microsoft Teams   Mr. Vic Quispe initially admitted for melena and anemia in the setting of supratherapeutic INR.  GI reconsulted for worsening anemia.    # Acute blood loss anemia and melena - hemodynamically unchanged. Likely represents recurrent GI bleed. Initial melena workup lead to capsule endoscopy on 6/14/2021 which revealed active bleeding in small bowel.  Single balloon 6/15/2021 revealed old blood in proximal ileum. 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  Plan for repeat capsule endoscopy, with further studies pending results and clinical course.  # Recent melena with presumed small bowel source  # LVAD on anticoagulation with coumadin  # Trach    Recommendations:  - Monitor H/H for now  - NPO midnight; if bleeding continues, will consider possible enteroscopy again as patient cannot swallow capsule now s/p trach  - PPI 40mg IVBID  - trend vitals, CBC, and monitor for clinical signs of bleeding  - transfuse for goal hemoglobin >/= 7.0  - Cardiology managing anticoagulation, however will consider further endoscopy/enteroscopy pending clinical course and results of repeat capsule endoscopy    Discussed plan with CTU attending Dr. Oneill and GI attending Dr. Lau.     Thank you for involving us in the care of this patient. Please reach out if any further questions.    Pj Trevino, PGY-5  GI Fellow    Available on Microsoft Teams  Pager [phone] ([facility]) or [phone] ([facility])  After 5PM/Weekends, please contact the on-call GI fellow: [phone]  Available through [employer] Teams

## 2021-07-25 NOTE — PROGRESS NOTE ADULT - PROBLEM SELECTOR PLAN 2
-Current speed 9200 RPM  INR 2.62 on 7/23   INR2.4 7/24t rending down coumadin on hold  - Plan to hold aspirin indefinitely given recurrent GIB.  -Continue to monitor LDH daily  7/25 Melena  PRBC x1  return  to CTU GIB

## 2021-07-25 NOTE — PROGRESS NOTE ADULT - ASSESSMENT
65 YO M with a history of stage D NICM s/p HM2 on 9/2017 as DT (due to severe PAD) with TV ring, prior COVID-19 infection 4/2020, recurrent syncope post LVAD s/p ILR, and chronic abdominal pain with prior negative workup who was admitted with symptomatic anemia with Hgb 4.5 in setting of INR 8.8 without hemodynamic instability. He was transfused and underwent VCE which showed active bleeding in the mid small bowel but subsequent enteroscopy 6/15 did not reveal any active bleeding. He acutely decompensated after procedure with fever/hypertension, low flow alarms, and pulmonary infiltrate with hypoxia requiring intubation from probable aspiration PNA. His LDH is normal and there are no signs of overt LVAD dysfunction on echo though signs of insufficiently unloaded ventricle for which his speed has been increased. Course notable for inability to wean ventilator with persistent secretions for which he underwent tracheostomy as well as acute c diff colitis. Worsening anemia over last few days requiring transfusion with low flow alarms, concerning for recurrent GI bleed for which he is planned for repeat VCE on Monday 7/26 and AC is being held.     Primary goals at this time is to progress towards tracheostomy decannulation and continue physical therapy for severe deconditioning.  67 YO M with a history of stage D NICM s/p HM2 on 9/2017 as DT (due to severe PAD) with TV ring, prior COVID-19 infection 4/2020, recurrent syncope post LVAD s/p ILR, and chronic abdominal pain with prior negative workup who was admitted with symptomatic anemia with Hgb 4.5 in setting of INR 8.8 without hemodynamic instability. He was transfused and underwent VCE which showed active bleeding in the mid small bowel but subsequent enteroscopy 6/15 did not reveal any active bleeding. He acutely decompensated after procedure with fever/hypertension, low flow alarms, and pulmonary infiltrate with hypoxia requiring intubation from probable aspiration PNA. His LDH is normal and there are no signs of overt LVAD dysfunction on echo though signs of insufficiently unloaded ventricle for which his speed has been increased. Course notable for inability to wean ventilator with persistent secretions for which he underwent tracheostomy as well as acute c diff colitis. Worsening anemia over last few days requiring transfusion with low flow alarms, concerning for recurrent GI bleed for which he is planned for repeat VCE on Monday 7/26 and AC is being held. Will discuss with GI as would prefer to go directly to enteroscopy.     Primary goals at this time is to progress towards tracheostomy decannulation, control GI bleed and continue physical therapy for severe deconditioning.

## 2021-07-25 NOTE — PROGRESS NOTE ADULT - PROBLEM SELECTOR PLAN 1
- continue metoprolol tartrate 25mg TID   - hold diuretics in the setting of fluid losses and concern for possible bleed   - Continue home amiodarone 200 mg PO daily - continue metoprolol tartrate 25mg TID (hold for MAP < 65)  - hold diuretics in the setting of fluid losses and concern for possible bleed   - Continue home amiodarone 200 mg PO daily

## 2021-07-26 NOTE — CHART NOTE - NSCHARTNOTEFT_GEN_A_CORE
Seen for: nutrition follow up   Source: EMR, RN, HF rounds    Chart reviewed, events noted. Per chart: 65 year old male with PMHx of NICM, s/p  HM2 LVAD in , on coumadin, CKD 3 presents with anemia, dark stools and supra therapeutic INR. S/p capsule study, endoscopy. Course c/b possible aspiration event. C-Diff +, being treated with vancomycin. S/p tracheostomy on . On , pt with episode of vomiting/low flow alarm while having bowel movement. Had another episode of vomiting on . On  had episode of melena associated with Hb drop, GI consulted. Plan for possible endoscopically placed capsule study today however, was given transfusion overnight and had reaction following, given benadryl, solumedrol, LR, albumin; av started. Receiving IVF while not getting tube feeds    Currently NPO after midnight  Diet, NPO with Tube Feed: Jevity 1.2 @ 60 ml/hr x 24 hours + Probiotic smoothie 2x/day     EN Order Provides: 1728 kcal, 80 g protein   (meeting 22 kcal/kg, 1.02 g/kg protein using dosing weight 78.5 kg)    EN provision per chart:   () held, pt refusing in setting of abdominal pain  () held  () 12% of goal, held after episode of vomiting  () 54% of goal   () 46% of goal   () 100% of goal  () 100% of goal    GI:    - C. Diff + (); remains on vancomycin   - Formula changed from Vital AF to Jevity 1.2 on  to provide more fiber to aid in stool bulking, Metamucil was also added at this time  - Diarrhea had seemed to improve, now with ?GI bleed  - Emesis: last on   - NGT to suction: () 250 ml    Stool count  () RN reports one overnight  () 1x  () 1x  () 2x  () none  () 2x    Weight history:   - Admission weight: 176.3 pounds / 80 kg (6/15)  - Current weight: 148.1 pounds / 67.2 kg () - standing weight     ** 28.2 pound/16% weight loss in 1 month    Weights:   Daily Weight in k.7 (), Weight in k.2 (), Weight in k (), Weight in k.2 (), Weight in k.8 (), Weight in k.7 ()    MEDICATIONS  (STANDING):  aMIOdarone    Tablet 200 milliGRAM(s) Oral daily  clonazePAM  Tablet 0.5 milliGRAM(s) Oral at bedtime  dextrose 5% + sodium chloride 0.45%. 1000 milliLiter(s) (50 mL/Hr) IV Continuous <Continuous>  insulin lispro (ADMELOG) corrective regimen sliding scale   SubCutaneous every 6 hours  metoprolol tartrate 25 milliGRAM(s) Oral three times a day  pantoprazole Infusion 8 mG/Hr (10 mL/Hr) IV Continuous <Continuous>  phenylephrine    Infusion 0.3 MICROgram(s)/kG/Min (8.83 mL/Hr) IV Continuous <Continuous>  vancomycin    Solution 125 milliGRAM(s) Oral daily    MEDICATIONS  (PRN):  acetaminophen    Suspension .. 650 milliGRAM(s) Oral every 6 hours PRN Mild Pain (1 - 3)  hydrALAZINE 25 milliGRAM(s) Oral every 8 hours PRN MAP greater than 85  simethicone 80 milliGRAM(s) Chew every 8 hours PRN Dyspepsia    Pertinent Labs:  @ 05:58: Na 133<L>, BUN 20, Cr 0.76, BG 95, K+ 3.9, Phos 2.4<L>, Mg 1.7, Alk Phos 73, ALT/SGPT 15, AST/SGOT 18, HbA1c --   @ 00:50: Na 130<L>, BUN 20, Cr 0.70, BG 89, K+ 5.1, Phos 3.3, Mg 1.8, Alk Phos 75, ALT/SGPT 16, AST/SGOT 21, HbA1c --    A1C with Estimated Average Glucose Result: 5.6 % (06-15-21 @ 02:42)    Finger Sticks:  POCT Blood Glucose.: 108 mg/dL ( @ 06:20)  POCT Blood Glucose.: 101 mg/dL ( @ 18:53)  POCT Blood Glucose.: 110 mg/dL ( @ 12:04)    Skin per chart: no pressure injuries  Edema per chart: none noted    Estimated Nutrition Needs:   - Using dosing weight 78.5 kg, considering vent support  - Energy Needs (25-30 kcal/kg): 4713-0812 kcal/day  - Protein Needs (1.2-1.4 g/kg):     Previous Nutrition Diagnosis: Moderate malnutrition  Nutrition diagnosis is advanced, being addressed with tube feeds as tolerated    New Nutrition Diagnosis: Severe chronic malnutrition related to inadequate protein-energy intake as evidenced by nutritional intake on average <75% of needs for 1+ month, 16% weight loss in 1 month       Recommendations:      1) Continue IVF while NPO, per medical team  2) As medically feasible, recommend trickle feeds of Jevity 1.2 @ 10-20ml/hr *via post-pyloric tube*. As tolerated, increase to eventual goal rate 70ml/hr x 24hr. Provides 2016 kcal, 93 g protein (meeting 25.6 kcal/kg, 1.2 g/kg protein using dosing weight 78.5 kg).  3) Consider prokinetic agents and bowel regimen if/when tube feeds resumed  4) If pt unable to tolerate tube feeds by mid week please consider alternate means of nutrition support    Continue monitoring and evaluating:   - Labs: blood glucose, electrolytes, renal indices  - GI function and bowel movements  - Nutritional intake, weight trends, skin integrity    RD remains available PRN and will follow up per protocol.   Kandice Peña RD, CDN, [facility]   Pager # [phone]

## 2021-07-26 NOTE — DIETITIAN NUTRITION RISK NOTIFICATION - TREATMENT: THE FOLLOWING DIET HAS BEEN RECOMMENDED
Diet, NPO after Midnight:      NPO Start Date: 25-Jul-2021,   NPO Start Time: 23:59 (07-25-21 @ 10:58) [Active]  Diet, NPO with Tube Feed:   Tube Feeding Modality: Nasogastric  Jevity 1.2 Tank (JEVITY1.2RTH)  Total Volume for 24 Hours (mL): 1440  Continuous  Starting Tube Feed Rate {mL per Hour}: 10  Increase Tube Feed Rate by (mL): 10     Every 6 hours  Until Goal Tube Feed Rate (mL per Hour): 60  Tube Feed Duration (in Hours): 24  Tube Feed Start Time: 09:30  Supplement Feeding Modality:  Nasogastric  Probiotic Yogurt/Smoothie Cans or Servings Per Day:  2       Frequency:  Daily (07-13-21 @ 09:18) [Active]

## 2021-07-26 NOTE — PROGRESS NOTE ADULT - ASSESSMENT
Mr. Vic Quispe initially admitted for melena and anemia in the setting of supratherapeutic INR.  GI reconsulted for worsening anemia.    # Acute blood loss anemia and melena - hemodynamically unchanged. Likely represents recurrent GI bleed. Initial melena workup lead to capsule endoscopy on 6/14/2021 which revealed active bleeding in small bowel.  Single balloon 6/15/2021 revealed old blood in proximal ileum. 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  Plan for repeat capsule endoscopy, with further studies pending results and clinical course.  # Recent melena with presumed small bowel source  # LVAD on anticoagulation with coumadin  # Trach    Recommendations:  - Possible endoscopy tomorrow given intermittent melena and anemia, however holding for now given transfusion reaction to FFP and low dose pressors  - NPO at midnight  - PPI 40mg IV BID  - trend vitals, CBC, and monitor for clinical signs of bleeding  - transfuse for goal hemoglobin >/= 7.0  - Cardiology managing anticoagulation        Tito Guerrier, PGY-4  GI/Hepatology Fellow    MONDAY-FRIDAY 8AM-5PM:  Available via [employer] Teams  Pager# [phone] ([facility]) or [phone] ([facility])  GI Phone# [phone] ([facility])    NON-URGENT CONSULTS:  Please email [email or web address]  AT NIGHT AND ON WEEKENDS:  Contact on-call GI fellow via answering service ([phone]) from 5pm-8am and on weekends/holidays

## 2021-07-26 NOTE — PROGRESS NOTE ADULT - ATTENDING COMMENTS
Patient seen and examined, agree with above. Suspect recurrent bleeding is from intermittent small bowel bleeding, which was seen on previous capsule but was not found on previous balloon enteroscopy as it had stopped. As patient had FFP just now and a possible transfusion reaction and brown stool per CTICU nursing team, would monitor today. If H/H continues to drop or melena persists, will then pursue repeat enteroscopy, discussed with patient that it's possible nothing will be there to treat if his bleeding is intermittent again and not active at the time of enteroscopy.

## 2021-07-26 NOTE — PROGRESS NOTE ADULT - ASSESSMENT
Assessment and Recommendation:   · Assessment	  Assessment and recommendation :  Recurrent Acute hypoxic respiratory Failure S/P tracheostomy on full ventilatory support alternating with trach. collar at 50% Fi02  Acute blood loss anemia S/P blood transfusion   recurrent rectal bleeding capsule endoscopy   reactive leucocytosis from GI Bleeding   S/P Acute left lower lobe pneumonia   Aspiration pneumonia   Stool is  +ve for C-diff. colitis on PO vancomycin  improved   S/P  Ancef improved for bacteremia   Non ischemic cardiomyopathy continue ACE inhibitor and B-Blockers   S/P Septic shock and cardiogenic shock   Stage D systolic heart failure S/P LVAD HM2   MH2 LVAD  with  TV Annuloplasty  Severe peripheral vascular disease   S/P Anion gap metabolic acidosis   hyperglycemia on insulin coverage    On  Amiodarone   critical care polyneuropathy    S/P Small bowel bleeding   Chronic kidney disease stage III   NGT feeding on Hold   PT and OT at bed side   GI prophylaxis with PPI   discuss with TCV surgeon

## 2021-07-26 NOTE — PROGRESS NOTE ADULT - SUBJECTIVE AND OBJECTIVE BOX
INFECTIOUS DISEASES FOLLOW UP-- Anitra Prater  [phone]    This is a follow up note for this  66yMale with  Anemia  returned to CTU with melanotic stools- developed fevers and chills while receiving blood products        ROS:  CONSTITUTIONAL: awake and alert  denies pain    Allergies    No Known Allergies    Intolerances        ANTIBIOTICS/RELEVANT:  antimicrobials  ceFAZolin   IVPB 1000 milliGRAM(s) IV Intermittent every 8 hours  vancomycin    Solution 125 milliGRAM(s) Oral daily  vancomycin  IVPB 1000 milliGRAM(s) IV Intermittent every 12 hours    immunologic:    OTHER:  acetaminophen    Suspension .. 650 milliGRAM(s) Oral every 6 hours PRN  aMIOdarone    Tablet 200 milliGRAM(s) Oral daily  chlorhexidine 0.12% Liquid 15 milliLiter(s) Oral Mucosa every 12 hours  clonazePAM  Tablet 0.5 milliGRAM(s) Oral at bedtime  dextrose 5% + sodium chloride 0.45%. 1000 milliLiter(s) IV Continuous <Continuous>  hydrALAZINE 25 milliGRAM(s) Oral every 8 hours PRN  insulin lispro (ADMELOG) corrective regimen sliding scale   SubCutaneous every 6 hours  metoprolol tartrate 25 milliGRAM(s) Oral three times a day  pantoprazole Infusion 8 mG/Hr IV Continuous <Continuous>  phenylephrine    Infusion 0.3 MICROgram(s)/kG/Min IV Continuous <Continuous>  simethicone 80 milliGRAM(s) Chew every 8 hours PRN  vasopressin Infusion 0.017 Unit(s)/Min IV Continuous <Continuous>      Objective:  Vital Signs Last 24 Hrs  T(C): 38.6 (2021 18:00), Max: 39.2 (2021 13:15)  T(F): 101.5 (2021 18:00), Max: 102.6 (2021 13:45)  HR: 114 (2021 18:30) (81 - 125)  BP: 94/59 (2021 08:00) (83/53 - 94/66)  BP(mean): 67 (2021 08:00) (63 - 75)  RR: 29 (2021 18:30) (14 - 83)  SpO2: 100% (2021 18:30) (91% - 100%)    PHYSICAL EXAM:  Constitutional:no acute distress seen just prior to new line placement  Eyes:ALONSO, EOMI  Ear/Nose/Throat: no oral lesions,trach site few secretions 	  Respiratory: clear BL  Cardiovascular: S1S2 LVAD sounds  Gastrointestinal:soft, (+) BS, no tenderness  LVAD dressing CDI  Extremities:no e/e/c  No Lymphadenopathy  IV sites not inflammed.    LABS:                        8.4    21.63 )-----------( 223      ( 2021 15:42 )             26.2         133<L>  |  100  |  20  ----------------------------<  95  3.9   |  22  |  0.76    Ca    9.1      2021 05:58  Phos  2.4       Mg     1.7         TPro  7.1  /  Alb  3.4  /  TBili  0.6  /  DBili  x   /  AST  18  /  ALT  15  /  AlkPhos  73      PT/INR - ( 2021 11:11 )   PT: 32.4 sec;   INR: 2.84 ratio         PTT - ( 2021 11:11 )  PTT:35.5 sec  Urinalysis Basic - ( 2021 08:44 )    Color: Yellow / Appearance: Clear / S.034 / pH: x  Gluc: x / Ketone: Moderate  / Bili: Small / Urobili: Negative   Blood: x / Protein: 30 mg/dL / Nitrite: Negative   Leuk Esterase: Negative / RBC: 1 /hpf / WBC 2 /HPF   Sq Epi: x / Non Sq Epi: 0 /hpf / Bacteria: Negative        MICROBIOLOGY:  Urine Microscopic-Add On (NC) (21 @ 08:44)    Red Blood Cell - Urine: 1 /hpf    White Blood Cell - Urine: 2 /HPF    Hyaline Casts: 1 /lpf    Bacteria: Negative    Epithelial Cells: 0 /hpf              RECENT CULTURES:      RADIOLOGY & ADDITIONAL STUDIES:    < from: Xray Chest 1 View- PORTABLE-Routine (Xray Chest 1 View- PORTABLE-Routine in AM.) (21 @ 02:28) >  The heart is slightly enlarged. Mild elevation right hemidiaphragm. Otherwise the lungs appear to be clear. Minimal fluid in the right fissure cannot be ruled out. All life supporting devices are in good position and unchanged when compared to previous study done 2021 at 4:59 AM. The overall appearance of the chest remain unchanged as well.    < end of copied text >

## 2021-07-26 NOTE — PROGRESS NOTE ADULT - ATTENDING COMMENTS
Became more hypotensive with low flows in setting of melena and drop in H/H requiring transfer to CTU. Of note, received FFP to reverse INR c/b fevers, hypotension concerning for possible transfusion reaction and given diphenhydramine, steroids, and tylenol. Persistently febrile with low BP requiring pressors. Cultures grew GNR. On exam, JVP approx 8 cm, LVAD hum, CTAB, nontender abdomen, no pedal edema. Labs reviewed - WBC 20, Hb 8.5, BUN/Cr 20/0.7,  (stable).   - c/w vanc/cefepime for GNR bacteremia  - central line for further management  - goal CVP 8-10   - repeat TTE given persistent low flows; maintain MAP >65  - plan for possible endoscopy tomorrow +/- VCE  - prognoiss guarded

## 2021-07-26 NOTE — PROGRESS NOTE ADULT - SUBJECTIVE AND OBJECTIVE BOX
RICKY HAMPTON  MRN-[id number]  Patient is a 66y old  Male who presents with a chief complaint of Anemia, Supratherapeutic INR, Dark Stools (2021 15:44)    HPI:  65M PMH ACC/AHA stage D HF due to NICM HM2 LVAD , TV annuloplasty ring 17 as destination therapy due to severe peripheral artery disease with significant stenosis  SIADH, Depression, CKD-3 with hyperkalemia, past E. coli UTIs, driveline drainage (21) and COVID-19 (back in 2020)  He was recently seen in clinic where he complained of abdominal pain and dark stools w constipation back in May. He presents to [facility] ER today weakness and fatigue, moderate and + Black stools for three days, on coumadin secondary to warfarin use in the setting of an LVAD. Patient has required transfusions for GIB in the past. Mostly recently back in 2021 pt had anemia with dark stools. No interventions was done at that time. However Last Endoscopy was done in 2020 (negative). Today labs show patient is anemic with H/H of 4.5/16.3,. INR is 8.84 MAP in the 90s, Temp 35.1. He denies any chest pain, shortness of breath, dizziness, abd pain, nausea or vomiting.       (2021 16:57)      Surgery/Hospital Course:   admit for melena w/ anemia, INR 8.84   6/15 Capsul study (+) for small bowel bleed, balloon endoscopy (old blood in prox ileum); post EGD - septic w/ L opacity, re-intubated for concern for aspiration, TTE (Mod MR, decrease biV w/ interventricular septum boweing towards R)   bronch    +C Diff    TC    CT C/A/P: Fluid filled colon which may be 2/2 rapid transit. Small bilateral pleffs with associates. Compressive atelectasis New ISABELLE & LLL  parenchymal opacities, suspicious for pneumonia. 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    #8 Shiley trach at bedside    CPAP trials    LVAD speed increased to 9200   Bronch; Central line dc'ed   TC since , continue as tolerated. Patient transferred to SDU.    Cont PT, no trach downsize today(#8cuffed)  Anticoagulation  Continue TF, speech f/u  Vanco taper    oob to chair  INR  2.47  5 mg coumadin     increased lop to 25 q8  per HF   INR today 2.64.  H&H 7.3 this AM.  Will repeat CBC at noon, and will send stool guaiac Patient with persistent abdominal tenderness, rate of tube feeds decreased.  No nausea/vomiting.     INR today 2.4H&H 9.1/28.6 low flow overnight /N&V  NPO resolved for capsule study  Coumadin on hold refusing Tube feeds on D5 / normal  @50 cc/hr   INR 2.69  H&H 7..1 refusing Tube feeds on D5 1/2 normal  @50 cc/hr. This am + BM Anusha Oneill   aware-  PRBC x1  GI team consulted -  NPO  plan on study in am-  D/w Dr Cadet Patient  to return to CTU for further management.    Today:    REVIEW OF SYSTEMS:  Unable to obtain secondary to pt being trached.     ICU Vital Signs Last 24 Hrs  T(C): 38.3 (2021 08:00), Max: 39.1 (2021 05:15)  T(F): 101 (2021 08:00), Max: 102.4 (2021 05:15)  HR: 103 (2021 08:00) (81 - 117)  BP: 94/59 (2021 08:00) (83/53 - 96/66)  BP(mean): 67 (2021 08:00) (63 - 77)  ABP: 85/50 (2021 08:00) (83/47 - 97/58)  ABP(mean): 63 (2021 08:00) (60 - 70)  RR: 23 (2021 08:00) (14 - 72)  SpO2: 97% (2021 08:00) (92% - 100%)      Physical Exam:  Appearance: No distress laying flat in bed  HEENT: EOMI  Neck: JVP does not appear elevated; +TC  Cardiovascular: +LVAD hum  Respiratory: Clear to auscultation bilaterally  Gastrointestinal: Soft, LLQ tender to palpation	  Skin: No cyanosis	  Neurologic: Non-focal  Extremities: No LE edema  Psychiatry: A & O x 3, Mood & affect appropriate      ============================I/O===========================   I&O's Detail    2021 07:  -  2021 07:00  --------------------------------------------------------  IN:    dextrose 5% + sodium chloride 0.45%: 1150 mL    Enteral Tube Flush: 120 mL    Pantoprazole: 180 mL    Phenylephrine: 73 mL    Plasma: 300 mL  Total IN: 1823 mL    OUT:    Miscellaneous Tube Feedin mL    Voided (mL): 950 mL  Total OUT: 950 mL    Total NET: 873 mL      2021 07:01  -  2021 08:53  --------------------------------------------------------  IN:    dextrose 5% + sodium chloride 0.45%: 50 mL    Pantoprazole: 10 mL    Phenylephrine: 29.3 mL  Total IN: 89.3 mL    OUT:  Total OUT: 0 mL    Total NET: 89.3 mL        ============================ LABS =========================                        7.8    20.33 )-----------( 227      ( 2021 07:29 )             24.6     -    133<L>  |  100  |  20  ----------------------------<  95  3.9   |  22  |  0.76    Ca    9.1      2021 05:58  Phos  2.4       Mg     1.7         TPro  7.1  /  Alb  3.4  /  TBili  0.6  /  DBili  x   /  AST  18  /  ALT  15  /  AlkPhos  73      LIVER FUNCTIONS - ( 2021 05:58 )  Alb: 3.4 g/dL / Pro: 7.1 g/dL / ALK PHOS: 73 U/L / ALT: 15 U/L / AST: 18 U/L / GGT: x           PT/INR - ( 2021 05:58 )   PT: 32.3 sec;   INR: 2.83 ratio           ABG - ( 2021 05:56 )  pH, Arterial: 7.43  pH, Blood: x     /  pCO2: 38    /  pO2: 131   / HCO3: 25    / Base Excess: 1.0   /  SaO2: 99                  ======================Micro/Rad/Cardio=================  Culture: Reviewed   CXR: Reviewed  Echo:Reviewed  ======================================================  PAST MEDICAL & SURGICAL HISTORY:  CHF (congestive heart failure)    CAD (coronary artery disease)    Depression    Pleural effusion    History of 2019 novel coronavirus disease (COVID-19)  2020    Hemorrhoids    Bleeding hemorrhoids    Peripheral arterial disease    Claudication    BPH with urinary obstruction    ACC/AHA stage D systolic heart failure    Anticoagulation goal of INR 2.0 to 2.5    Falls    Clavicle fracture    CKD (chronic kidney disease), stage III    Iron deficiency anemia    H/O epistaxis    Vertigo    GI bleed    S/P TVR (tricuspid valve repair)    S/P ventricular assist device    S/P endoscopy      ====================ASSESSMENT ==============  Stage D Nonischemic Cardiomyopathy, Status Post HM2 on 2017    Cardiogenic shock  Hemodynamic instability   Acute hypoxemic respiratory failure  GI bleed , Status Post Enteroscopy   Anemia, in setting of melena   Chronic Kidney Disease  Stress hyperglycemia   C.diff positive on    Hypovolemic shock  Hyponatremia   Leukocytosis      Plan:  ====================== NEUROLOGY=====================  Nonfocal, continue to monitor neuro status per ICU protocol.   Tylenol PRN for analgesia   C/w clonazepam for agitation     acetaminophen    Suspension .. 650 milliGRAM(s) Oral every 6 hours PRN Mild Pain (1 - 3)  clonazePAM  Tablet 0.5 milliGRAM(s) Oral at bedtime  ==================== RESPIRATORY======================  Acute hypoxemic resp failure s/p #8 shiley trach on on ; TC since , continue as tolerated.   Continue close monitoring of respiratory rate and breathing pattern, following of ABG’s with A-line monitoring, continuous pulse oximetry monitoring.     ====================CARDIOVASCULAR==================  Stage D Nonischemic Cardiomyopathy, Status Post HM2 on 2017; LVAD settings 9200 with flow of 4.4  Echo 6/15: severe global Left ventricular systolic dysfunction with HM2 in place, decreased Right ventricular systolic function, interventricular septums bows slightly to right, otherwise grossly similar to prior.   Invasive hemodynamic monitoring, assess perfusion indices.   Continue rate control with Amiodarone   Pressor support with Hydralazine, and lopressor       aMIOdarone    Tablet 200 milliGRAM(s) Oral daily  hydrALAZINE 25 milliGRAM(s) Oral every 8 hours PRN MAP greater than 85  metoprolol tartrate 25 milliGRAM(s) Oral three times a day  phenylephrine    Infusion 0.3 MICROgram(s)/kG/Min (8.83 mL/Hr) IV Continuous <Continuous>  ===================HEMATOLOGIC/ONC ===================  Anemia due to acute blood loss associated with small bowel bleed  Thrombocytopenia   H/H 7.8/24.6  Continue to monitor hemoglobin and hematocrit levels.   ===================== RENAL =========================  Continue to monitor I/Os, BUN/Creatinine, and urine output.   Goal net negative fluid balance. Replete lytes PRN. Keep K> 4 and Mg >2.     ==================== GASTROINTESTINAL===================  NPO  Continue Protonix for stress ulcer prophylaxis.     dextrose 5% + sodium chloride 0.45%. 1000 milliLiter(s) (50 mL/Hr) IV Continuous <Continuous>  pantoprazole Infusion 8 mG/Hr (10 mL/Hr) IV Continuous <Continuous>  simethicone 80 milliGRAM(s) Chew every 8 hours PRN Dyspepsia  =======================    ENDOCRINE  =====================  A1c 5.6   Hyperglycemia, glycemic control with Admelog sliding scale   Monitor blood glucose levels.     insulin lispro (ADMELOG) corrective regimen sliding scale   SubCutaneous every 6 hours  ========================INFECTIOUS DISEASE================  Febrile, Leukocytosis w/ WBC rising from 17.85 --> 20.33  Monitor temperature and trend WBC.   : C.Diff positive requiring vancomycin     vancomycin    Solution 125 milliGRAM(s) Oral daily      Patient requires continuous monitoring with bedside rhythm monitoring, pulse ox monitoring, and intermittent blood gas analysis. Care plan discussed with ICU care team. Patient remained critical and at risk for life threatening decompensation.     By signing my name below, Janina STANLEY Tiffany, attest that this documentation has been prepared under the direction and in the presence of CLAUDIA Lee  Electronically signed: Emily Roa Scribe, 21 @ 08:53    Georgina STANLEY PA , personally performed the services described in this documentation. all medical record entries made by the scribe were at my direction and in my presence. I have reviewed the chart and agree that the record reflects my personal performance and is accurate and complete  Electronically signed: CLAUDIA Lee        RICKY HAMPTON  MRN-[id number]  Patient is a 66y old  Male who presents with a chief complaint of Anemia, Supratherapeutic INR, Dark Stools (2021 15:44)    HPI:  65M PMH ACC/AHA stage D HF due to NICM HM2 LVAD , TV annuloplasty ring 17 as destination therapy due to severe peripheral artery disease with significant stenosis  SIADH, Depression, CKD-3 with hyperkalemia, past E. coli UTIs, driveline drainage (21) and COVID-19 (back in 2020)  He was recently seen in clinic where he complained of abdominal pain and dark stools w constipation back in May. He presents to [facility] ER today weakness and fatigue, moderate and + Black stools for three days, on coumadin secondary to warfarin use in the setting of an LVAD. Patient has required transfusions for GIB in the past. Mostly recently back in 2021 pt had anemia with dark stools. No interventions was done at that time. However Last Endoscopy was done in 2020 (negative). Today labs show patient is anemic with H/H of 4.5/16.3,. INR is 8.84 MAP in the 90s, Temp 35.1. He denies any chest pain, shortness of breath, dizziness, abd pain, nausea or vomiting.       (2021 16:57)      Surgery/Hospital Course:   admit for melena w/ anemia, INR 8.84   6/15 Capsul study (+) for small bowel bleed, balloon endoscopy (old blood in prox ileum); post EGD - septic w/ L opacity, re-intubated for concern for aspiration, TTE (Mod MR, decrease biV w/ interventricular septum boweing towards R)   bronch    +C Diff    TC    CT C/A/P: Fluid filled colon which may be 2/2 rapid transit. Small bilateral pleffs with associates. Compressive atelectasis New ISABELLE & LLL  parenchymal opacities, suspicious for pneumonia. 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    #8 Shiley trach at bedside    CPAP trials    LVAD speed increased to 9200   Bronch; Central line dc'ed   TC since , continue as tolerated. Patient transferred to SDU.    Cont PT, no trach downsize today(#8cuffed)  Anticoagulation  Continue TF, speech f/u  Vanco taper    oob to chair  INR  2.47  5 mg coumadin     increased lop to 25 q8  per HF   INR today 2.64.  H&H 7.3 this AM.  Will repeat CBC at noon, and will send stool guaiac Patient with persistent abdominal tenderness, rate of tube feeds decreased.  No nausea/vomiting.     INR today 2.4H&H 9.1/28.6 low flow overnight /N&V  NPO resolved for capsule study  Coumadin on hold refusing Tube feeds on D5 / normal  @50 cc/hr   INR 2.69  H&H 7..1 refusing Tube feeds on D5 1/2 normal  @50 cc/hr. This am + BM Anusha Oneill   aware-  PRBC x1  GI team consulted -  NPO  plan on study in am-  D/w Dr Cadet Patient  to return to CTU for further management.    Today:    pt refusing tube feeds;     REVIEW OF SYSTEMS:  Unable to obtain secondary to pt being trached.     ICU Vital Signs Last 24 Hrs  T(C): 38.3 (2021 08:00), Max: 39.1 (2021 05:15)  T(F): 101 (2021 08:00), Max: 102.4 (2021 05:15)  HR: 103 (2021 08:00) (81 - 117)  BP: 94/59 (2021 08:00) (83/53 - 96/66)  BP(mean): 67 (2021 08:00) (63 - 77)  ABP: 85/50 (2021 08:00) (83/47 - 97/58)  ABP(mean): 63 (2021 08:00) (60 - 70)  RR: 23 (2021 08:00) (14 - 72)  SpO2: 97% (2021 08:00) (92% - 100%)      Physical Exam:  Appearance: No distress laying flat in bed  HEENT: EOMI  Neck: JVP does not appear elevated; +TC  Cardiovascular: +LVAD hum  Respiratory: Clear to auscultation bilaterally  Gastrointestinal: Soft, LLQ tender to palpation	  Skin: No cyanosis	  Neurologic: Non-focal  Extremities: No LE edema  Psychiatry: A & O x 3, Mood & affect appropriate      ============================I/O===========================   I&O's Detail    2021 07:01  -  2021 07:00  --------------------------------------------------------  IN:    dextrose 5% + sodium chloride 0.45%: 1150 mL    Enteral Tube Flush: 120 mL    Pantoprazole: 180 mL    Phenylephrine: 73 mL    Plasma: 300 mL  Total IN: 1823 mL    OUT:    Miscellaneous Tube Feedin mL    Voided (mL): 950 mL  Total OUT: 950 mL    Total NET: 873 mL      2021 07:01  -  2021 08:53  --------------------------------------------------------  IN:    dextrose 5% + sodium chloride 0.45%: 50 mL    Pantoprazole: 10 mL    Phenylephrine: 29.3 mL  Total IN: 89.3 mL    OUT:  Total OUT: 0 mL    Total NET: 89.3 mL        ============================ LABS =========================                        7.8    20.33 )-----------( 227      ( 2021 07:29 )             24.6     07-    133<L>  |  100  |  20  ----------------------------<  95  3.9   |  22  |  0.76    Ca    9.1      2021 05:58  Phos  2.4     -  Mg     1.7         TPro  7.1  /  Alb  3.4  /  TBili  0.6  /  DBili  x   /  AST  18  /  ALT  15  /  AlkPhos  73      LIVER FUNCTIONS - ( 2021 05:58 )  Alb: 3.4 g/dL / Pro: 7.1 g/dL / ALK PHOS: 73 U/L / ALT: 15 U/L / AST: 18 U/L / GGT: x           PT/INR - ( 2021 05:58 )   PT: 32.3 sec;   INR: 2.83 ratio           ABG - ( 2021 05:56 )  pH, Arterial: 7.43  pH, Blood: x     /  pCO2: 38    /  pO2: 131   / HCO3: 25    / Base Excess: 1.0   /  SaO2: 99                  ======================Micro/Rad/Cardio=================  Culture: Reviewed   CXR: Reviewed  Echo:Reviewed  ======================================================  PAST MEDICAL & SURGICAL HISTORY:  CHF (congestive heart failure)    CAD (coronary artery disease)    Depression    Pleural effusion    History of 2019 novel coronavirus disease (COVID-19)  2020    Hemorrhoids    Bleeding hemorrhoids    Peripheral arterial disease    Claudication    BPH with urinary obstruction    ACC/AHA stage D systolic heart failure    Anticoagulation goal of INR 2.0 to 2.5    Falls    Clavicle fracture    CKD (chronic kidney disease), stage III    Iron deficiency anemia    H/O epistaxis    Vertigo    GI bleed    S/P TVR (tricuspid valve repair)    S/P ventricular assist device    S/P endoscopy      ====================ASSESSMENT ==============  Stage D Nonischemic Cardiomyopathy, Status Post HM2 on 2017    Cardiogenic shock  Hemodynamic instability   Acute hypoxemic respiratory failure  GI bleed , Status Post Enteroscopy   Anemia, in setting of melena   Chronic Kidney Disease  Stress hyperglycemia   C.diff positive on    Hypovolemic shock  Hyponatremia   Leukocytosis      Plan:  ====================== NEUROLOGY=====================  Nonfocal, continue to monitor neuro status per ICU protocol.   Tylenol PRN for analgesia   C/w clonazepam for agitation     acetaminophen    Suspension .. 650 milliGRAM(s) Oral every 6 hours PRN Mild Pain (1 - 3)  clonazePAM  Tablet 0.5 milliGRAM(s) Oral at bedtime  ==================== RESPIRATORY======================  Acute hypoxemic resp failure s/p #8 shiley trach on on ; TC since , continue as tolerated.   Continue close monitoring of respiratory rate and breathing pattern, following of ABG’s with A-line monitoring, continuous pulse oximetry monitoring.     ====================CARDIOVASCULAR==================  Stage D Nonischemic Cardiomyopathy, Status Post HM2 on 2017; LVAD settings 9200 with flow of 4.4  Echo 6/15: severe global Left ventricular systolic dysfunction with HM2 in place, decreased Right ventricular systolic function, interventricular septums bows slightly to right, otherwise grossly similar to prior.   Invasive hemodynamic monitoring, assess perfusion indices.   Continue rate control with Amiodarone   Pressor support with Hydralazine, and lopressor       aMIOdarone    Tablet 200 milliGRAM(s) Oral daily  hydrALAZINE 25 milliGRAM(s) Oral every 8 hours PRN MAP greater than 85  metoprolol tartrate 25 milliGRAM(s) Oral three times a day  phenylephrine    Infusion 0.3 MICROgram(s)/kG/Min (8.83 mL/Hr) IV Continuous <Continuous>  ===================HEMATOLOGIC/ONC ===================  Anemia due to acute blood loss associated with small bowel bleed  Thrombocytopenia   H/H 7.8/24.6  Continue to monitor hemoglobin and hematocrit levels.   ===================== RENAL =========================  Continue to monitor I/Os, BUN/Creatinine, and urine output.   Goal net negative fluid balance. Replete lytes PRN. Keep K> 4 and Mg >2.     ==================== GASTROINTESTINAL===================  pt refusing tube feeds  Continue Protonix for stress ulcer prophylaxis.     dextrose 5% + sodium chloride 0.45%. 1000 milliLiter(s) (50 mL/Hr) IV Continuous <Continuous>  pantoprazole Infusion 8 mG/Hr (10 mL/Hr) IV Continuous <Continuous>  simethicone 80 milliGRAM(s) Chew every 8 hours PRN Dyspepsia  =======================    ENDOCRINE  =====================  A1c 5.6   Hyperglycemia, glycemic control with Admelog sliding scale   Monitor blood glucose levels.     insulin lispro (ADMELOG) corrective regimen sliding scale   SubCutaneous every 6 hours  ========================INFECTIOUS DISEASE================  Febrile, Leukocytosis w/ WBC rising from 17.85 --> 20.33  Monitor temperature and trend WBC.   : C.Diff positive requiring vancomycin     vancomycin    Solution 125 milliGRAM(s) Oral daily      Patient requires continuous monitoring with bedside rhythm monitoring, pulse ox monitoring, and intermittent blood gas analysis. Care plan discussed with ICU care team. Patient remained critical and at risk for life threatening decompensation.     By signing my name below, Janina STANLEY Tiffany, attest that this documentation has been prepared under the direction and in the presence of CLAUDIA Lee  Electronically signed: Emily Roa Scribe, 21 @ 08:53    Georgina STANLEY PA , personally performed the services described in this documentation. all medical record entries made by the scribe were at my direction and in my presence. I have reviewed the chart and agree that the record reflects my personal performance and is accurate and complete  Electronically signed: CLAUDIA Lee        RICKY HAMPTON  MRN-[id number]  Patient is a 66y old  Male who presents with a chief complaint of Anemia, Supratherapeutic INR, Dark Stools (2021 15:44)    HPI:  65M PMH ACC/AHA stage D HF due to NICM HM2 LVAD , TV annuloplasty ring 17 as destination therapy due to severe peripheral artery disease with significant stenosis  SIADH, Depression, CKD-3 with hyperkalemia, past E. coli UTIs, driveline drainage (21) and COVID-19 (back in 2020)  He was recently seen in clinic where he complained of abdominal pain and dark stools w constipation back in May. He presents to [facility] ER today weakness and fatigue, moderate and + Black stools for three days, on coumadin secondary to warfarin use in the setting of an LVAD. Patient has required transfusions for GIB in the past. Mostly recently back in 2021 pt had anemia with dark stools. No interventions was done at that time. However Last Endoscopy was done in 2020 (negative). Today labs show patient is anemic with H/H of 4.5/16.3,. INR is 8.84 MAP in the 90s, Temp 35.1. He denies any chest pain, shortness of breath, dizziness, abd pain, nausea or vomiting.       (2021 16:57)      Surgery/Hospital Course:   admit for melena w/ anemia, INR 8.84   6/15 Capsul study (+) for small bowel bleed, balloon endoscopy (old blood in prox ileum); post EGD - septic w/ L opacity, re-intubated for concern for aspiration, TTE (Mod MR, decrease biV w/ interventricular septum boweing towards R)   bronch    +C Diff    TC    CT C/A/P: Fluid filled colon which may be 2/2 rapid transit. Small bilateral pleffs with associates. Compressive atelectasis New ISABELLE & LLL  parenchymal opacities, suspicious for pneumonia. 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    #8 Shiley trach at bedside    CPAP trials    LVAD speed increased to 9200   Bronch; Central line dc'ed   TC since , continue as tolerated. Patient transferred to SDU.    Cont PT, no trach downsize today(#8cuffed)  Anticoagulation  Continue TF, speech f/u  Vanco taper    oob to chair  INR  2.47  5 mg coumadin     increased lop to 25 q8  per HF   INR today 2.64.  H&H 7.3 this AM.  Will repeat CBC at noon, and will send stool guaiac Patient with persistent abdominal tenderness, rate of tube feeds decreased.  No nausea/vomiting.     INR today 2.4H&H 9.1/28.6 low flow overnight /N&V  NPO resolved for capsule study  Coumadin on hold refusing Tube feeds on D5 / normal  @50 cc/hr   INR 2.69  H&H 7..1 refusing Tube feeds on D5 1/2 normal  @50 cc/hr. This am + BM Anusha Oneill   aware- PRBC x1  GI team consulted -  NPO  plan on study in am-  D/w Dr Cadet Patient  to return to CTU for further management.    Today: Earlier in am ffp given (allergic rx?, pt became tachy, hypotensive rising temp) s/p hydrocortisone and benadryl. RIJ placed for pressor requirements in setting of GI bleed.  GI will monitor for now.       REVIEW OF SYSTEMS:  Unable to obtain secondary to pt being trached.     ICU Vital Signs Last 24 Hrs  T(C): 38.3 (2021 08:00), Max: 39.1 (2021 05:15)  T(F): 101 (2021 08:00), Max: 102.4 (2021 05:15)  HR: 103 (2021 08:00) (81 - 117)  BP: 94/59 (2021 08:00) (83/53 - 96/66)  BP(mean): 67 (2021 08:00) (63 - 77)  ABP: 85/50 (2021 08:00) (83/47 - 97/58)  ABP(mean): 63 (2021 08:00) (60 - 70)  RR: 23 (2021 08:00) (14 - 72)  SpO2: 97% (2021 08:00) (92% - 100%)      Physical Exam:  Appearance: No distress laying flat in bed  HEENT: EOMI  Neck: JVP does not appear elevated; +TC  Cardiovascular: +LVAD hum  Respiratory: Clear to auscultation bilaterally  Gastrointestinal: Soft, LLQ tender to palpation	  Skin: No cyanosis	  Neurologic: Non-focal  Extremities: No LE edema  Psychiatry: A & O x 3, Mood & affect appropriate      ============================I/O===========================   I&O's Detail    2021 07:01  -  2021 07:00  --------------------------------------------------------  IN:    dextrose 5% + sodium chloride 0.45%: 1150 mL    Enteral Tube Flush: 120 mL    Pantoprazole: 180 mL    Phenylephrine: 73 mL    Plasma: 300 mL  Total IN: 1823 mL    OUT:    Miscellaneous Tube Feedin mL    Voided (mL): 950 mL  Total OUT: 950 mL    Total NET: 873 mL      2021 07:01  -  2021 08:53  --------------------------------------------------------  IN:    dextrose 5% + sodium chloride 0.45%: 50 mL    Pantoprazole: 10 mL    Phenylephrine: 29.3 mL  Total IN: 89.3 mL    OUT:  Total OUT: 0 mL    Total NET: 89.3 mL        ============================ LABS =========================                        7.8    20.33 )-----------( 227      ( 2021 07:29 )             24.6     07-26    133<L>  |  100  |  20  ----------------------------<  95  3.9   |  22  |  0.76    Ca    9.1      2021 05:58  Phos  2.4       Mg     1.7         TPro  7.1  /  Alb  3.4  /  TBili  0.6  /  DBili  x   /  AST  18  /  ALT  15  /  AlkPhos  73      LIVER FUNCTIONS - ( 2021 05:58 )  Alb: 3.4 g/dL / Pro: 7.1 g/dL / ALK PHOS: 73 U/L / ALT: 15 U/L / AST: 18 U/L / GGT: x           PT/INR - ( 2021 05:58 )   PT: 32.3 sec;   INR: 2.83 ratio           ABG - ( 2021 05:56 )  pH, Arterial: 7.43  pH, Blood: x     /  pCO2: 38    /  pO2: 131   / HCO3: 25    / Base Excess: 1.0   /  SaO2: 99                  ======================Micro/Rad/Cardio=================  Culture: Reviewed   CXR: Reviewed  Echo:Reviewed  ======================================================  PAST MEDICAL & SURGICAL HISTORY:  CHF (congestive heart failure)    CAD (coronary artery disease)    Depression    Pleural effusion    History of 2019 novel coronavirus disease (COVID-19)  2020    Hemorrhoids    Bleeding hemorrhoids    Peripheral arterial disease    Claudication    BPH with urinary obstruction    ACC/AHA stage D systolic heart failure    Anticoagulation goal of INR 2.0 to 2.5    Falls    Clavicle fracture    CKD (chronic kidney disease), stage III    Iron deficiency anemia    H/O epistaxis    Vertigo    GI bleed    S/P TVR (tricuspid valve repair)    S/P ventricular assist device    S/P endoscopy      ====================ASSESSMENT ==============  Stage D Nonischemic Cardiomyopathy, Status Post HM2 on 2017    Cardiogenic shock  Hemodynamic instability   Acute hypoxemic respiratory failure  GI bleed , Status Post Enteroscopy   Anemia, in setting of melena   Chronic Kidney Disease  Stress hyperglycemia   C.diff positive on    Hypovolemic shock  Hyponatremia   Leukocytosis      Plan:  ====================== NEUROLOGY=====================  Nonfocal, continue to monitor neuro status per ICU protocol.   Tylenol PRN for analgesia   C/w clonazepam for agitation     acetaminophen    Suspension .. 650 milliGRAM(s) Oral every 6 hours PRN Mild Pain (1 - 3)  clonazePAM  Tablet 0.5 milliGRAM(s) Oral at bedtime  ==================== RESPIRATORY======================  Acute hypoxemic resp failure s/p #8 shiley trach on on ; TC since , HOWEVER, no back on SIMV for hypoxia on .  Continue close monitoring of respiratory rate and breathing pattern, following of ABG’s with A-line monitoring, continuous pulse oximetry monitoring.     ====================CARDIOVASCULAR==================  Stage D Nonischemic Cardiomyopathy, Status Post HM2 on 2017; LVAD settings 9200 with flow of 4.4  Echo 6/15: severe global Left ventricular systolic dysfunction with HM2 in place, decreased Right ventricular systolic function, interventricular septums bows slightly to right, otherwise grossly similar to prior.   Invasive hemodynamic monitoring, assess perfusion indices.   Continue rate control with Amiodarone   Pressor support with Hydralazine, and lopressor (now on hold in setting of pressor requirements- on av, switched over to vaso)         aMIOdarone    Tablet 200 milliGRAM(s) Oral daily  hydrALAZINE 25 milliGRAM(s) Oral every 8 hours PRN MAP greater than 85  metoprolol tartrate 25 milliGRAM(s) Oral three times a day  phenylephrine    Infusion 0.3 MICROgram(s)/kG/Min (8.83 mL/Hr) IV Continuous <Continuous>  ===================HEMATOLOGIC/ONC ===================  Anemia due to acute blood loss associated with upper GI bleed. s/p 1prbc and 1 ffp today (inr 3.3 now 2.8)   Thrombocytopenia   H/H 7.8/24.6  Continue to monitor hemoglobin and hematocrit levels.   ===================== RENAL =========================  Continue to monitor I/Os, BUN/Creatinine, and urine output.   Goal net negative fluid balance. Replete lytes PRN. Keep K> 4 and Mg >2.     ==================== GASTROINTESTINAL===================  restart tube feeds.   Continue Protonix for stress ulcer prophylaxis.     dextrose 5% + sodium chloride 0.45%. 1000 milliLiter(s) (50 mL/Hr) IV Continuous <Continuous>  pantoprazole Infusion 8 mG/Hr (10 mL/Hr) IV Continuous <Continuous>  simethicone 80 milliGRAM(s) Chew every 8 hours PRN Dyspepsia  =======================    ENDOCRINE  =====================  A1c 5.6   Hyperglycemia, glycemic control with Admelog sliding scale   Monitor blood glucose levels.     insulin lispro (ADMELOG) corrective regimen sliding scale   SubCutaneous every 6 hours  ========================INFECTIOUS DISEASE================  Febrile, Leukocytosis w/ WBC rising from 17.85 --> 20.33  Monitor temperature and trend WBC.   : C.Diff positive requiring vancomycin     -Allergic rx?, s/p ffp given on ... pt became tachy, hypotensive rising temp) s/p hydrocortisone and benadryl  -rising wbc/ temp of 102 with pressor requirements started on vanco and cefepime on .     vancomycin    Solution 125 milliGRAM(s) Oral daily      Patient requires continuous monitoring with bedside rhythm monitoring, pulse ox monitoring, and intermittent blood gas analysis. Care plan discussed with ICU care team. Patient remained critical and at risk for life threatening decompensation.     By signing my name below, IJanina Tiffany, attest that this documentation has been prepared under the direction and in the presence of CLAUDIA Lee  Electronically signed: Emily Roa Scribe, 21 @ 08:53    Georgina STANLEY PA , personally performed the services described in this documentation. all medical record entries made by the luisibe were at my direction and in my presence. I have reviewed the chart and agree that the record reflects my personal performance and is accurate and complete  Electronically signed: CLAUDIA Lee

## 2021-07-26 NOTE — PROCEDURE NOTE - NSICDXPROCEDURE_GEN_ALL_CORE_FT
PROCEDURES:  Insertion, arterial line, percutaneous 26-Jul-2021 05:58:34  Go Sellers  
PROCEDURES:  Ultrasound guidance for placement of central venous catheter (CVC) 26-Jul-2021 17:55:16  Tiffani Bailon

## 2021-07-26 NOTE — PROGRESS NOTE ADULT - SUBJECTIVE AND OBJECTIVE BOX
RICKY JOINT  MRN#: [id number]  Subjective:  Pulmonary progress  : recurrent Acute hypoxic respiratory Failure ,aspiration pneumonia, NICM  , chart reviewed and H/O obtained radiological and Laboratory study reviewed patient Examined     65M PMH ACC/AHA stage D HF due to NICM HM2 LVAD , TV annuloplasty ring 17 as destination therapy due to severe peripheral artery disease with significant stenosis  SIADH, Depression, CKD-3 with hyperkalemia, past E. coli UTIs, driveline drainage (21) and COVID-19 (back in 2020)  He was recently seen in clinic where he complained of abdominal pain and dark stools w constipation back in May. He presents to [facility] ER today weakness and fatigue, moderate and + Black stools for three days, on coumadin secondary to warfarin use in the setting of an LVAD. Patient has required transfusions for GIB in the past. Mostly recently back in 2021 pt had anemia with dark stools. No interventions was done at that time. However Last Endoscopy was done in 2020 (negative). Today labs show patient is anemic with H/H of 4.5/16.3,. INR is 8.84 MAP in the 90s, Temp 35.1. He denies any chest pain, shortness of breath, dizziness, abd pain, nausea or vomiting. found to have  rectal bleeding underwent endoscopy ,old blood in the proximal ileum ,  develop sepsis with LL opacity given Antibiotics , Extubated , reintubated , Bronchoscopy on Zosyn for LL pneumonia  and Amiodarone S/P TV Annuloplasty , patient remain intubated on full ventilatory support .S/P multiple units of blood transfusion , remain on full ventilatory support on Precedex and propofol , new central IJ line , diarrhea C diff. +ve on po vancomycin and IV Flagyl,  mildly distended belly , fever start on cefepime 2gm q 8 hrs S/P tracheostomy .  new RT Subclavian central line continue on contact  isolation ,still diarrhea on C-diff antibiotics ENT follow up appreciated , trial of C-PAP as tolerated , , copious secretion from trach. site chest x ray left lower lobe pneumonia , tolerating trch. collar 50% FI02 still excessive secretion need pulmonary toilet , off Ancef antibiotic , no more diarrhea back on full support mechanical ventilator , chest x ray show improvement in LLL air space disease, more awake and responsive on tube feeding no more diarrhea , S/P  Ancef for bacteremia no fever ,ID follow up noted ,  no nausea or vomiting or diarrhea still very weak and tired , event note pulled NG tube now replaced , back on tube feeding ,still on po vancomycin , getting PT and OT at bed side , no plan for decannulation for now. , no more diarrhea receiving PT and OPT at bed side , minimal secretion from tracheostomy site , no SOB getting stronger , improve muscle tone patient transfer to monitor bed still on contact isolation for C-Difficel colitis on 50% FI02, NG tube clogged , and change to resume tube feeding still loose stool . H/H drop significantly require blood transfusion , most likely GI bleeding , IV heparin D/C , vomiting 200 cc of creamy color tube feeding on hold no sob, still melena monitor in the CTU possible capsule endoscopy , H/H is stable .the patient  refuse tube feeding. reactive leucocytosis from GI bleeding .  on phenylephrine hor BP support          (2021 16:57)    PAST MEDICAL & SURGICAL HISTORY:  CHF (congestive heart failure)    CAD (coronary artery disease)  Depression    Pleural effusion    History of 2019 novel coronavirus disease (COVID-19)  2020    Hemorrhoids    Bleeding hemorrhoids    Peripheral arterial disease    Claudication    BPH with urinary obstruction    ACC/AHA stage D systolic heart failure  Anticoagulation goal of INR 2.0 to 2.5    Falls    Clavicle fracture    CKD (chronic kidney disease), stage III    Iron deficiency anemia    H/O epistaxis    Vertigo    GI bleed    S/P TVR (tricuspid valve repair)    S/P ventricular assist device    S/P endoscopy    OBJECTIVE:  ICU Vital Signs Last 24 Hrs  T(C): 38.2 (2021 10:00), Max: 38.5 (2021 12:00)  T(F): 100.8 (2021 10:00), Max: 101.3 (2021 12:00)  HR: 65 (2021 10:00) (61 - 69)  BP: --  BP(mean): --  ABP: 105/67 (2021 10:00) (90/54 - 113/64)  ABP(mean): 77 (2021 10:00) (63 - 77)  RR: 20 (2021 10:00) (19 - 35)  SpO2: 99% (2021 10:00) (96% - 100%)       @ 07: @ 07:00  --------------------------------------------------------  IN: 2693.9 mL / OUT: 1415 mL / NET: 1278.9 mL     @ 07: @ 10:49  --------------------------------------------------------  IN: 420.8 mL / OUT: 115 mL / NET: 305.8 mL  PHYSICAL EXAM:Daily   Elderly male S/P tracheostomy   on  trach. collar 50% FI02   Daily Weight in k.4 (2021 00:00)  HEENT:     + NCAT  + EOMI  - Conjuctival edema   - Icterus   - Thrush   - ETT  + NGT/OGT  Neck:         + FROM   RT SC line  JVD     - Nodes     - Masses    + Mid-line trachea + Tracheostomy  Chest: normal findings  Lungs:          + CTA   + Rhonchi    - Rales    - Wheezing + Decreased  LT BS   - Dullness R L  Cardiac:       + S1 + S2    + RRR   - Irregular   - S3  - S4    - Murmurs   - Rub   - Hamman’s sign   Abdomen:    + BS     + Soft    + Non-tender     - Distended    - Organomegaly  - PEG  Extremities:   - Cyanosis U/L   - Clubbing  U/L  + LE/UE Edema   + Capillary refill    + Pulses   Neuro:        - Awake   -  Alert   - Confused   - Lethargic   + Sedated  + Generalized Weakness  Skin:        - Rashes    - Erythema   + Normal incisions   + IV sites intact          HOSPITAL MEDICATIONS: All mediciations reviewed and analyzed  MEDICATIONS  (STANDING):  aMIOdarone    Tablet 200 milliGRAM(s) Oral daily  chlorhexidine 0.12% Liquid 15 milliLiter(s) Oral Mucosa every 12 hours  chlorhexidine 2% Cloths 1 Application(s) Topical <User Schedule>  dexMEDEtomidine Infusion 0.5 MICROgram(s)/kG/Hr (9.81 mL/Hr) IV Continuous <Continuous>  dextrose 50% Injectable 50 milliLiter(s) IV Push every 15 minutes  heparin  Infusion 400 Unit(s)/Hr (12.5 mL/Hr) IV Continuous <Continuous>  HYDROmorphone  Injectable 0.5 milliGRAM(s) IV Push once  insulin lispro (ADMELOG) corrective regimen sliding scale   SubCutaneous every 6 hours  pantoprazole  Injectable 40 milliGRAM(s) IV Push every 12 hours  piperacillin/tazobactam IVPB.. 3.375 Gram(s) IV Intermittent every 8 hours  propofol Infusion 20 MICROgram(s)/kG/Min (9.42 mL/Hr) IV Continuous <Continuous>  sodium chloride 0.9% lock flush 3 milliLiter(s) IV Push every 8 hours  sodium chloride 0.9%. 1000 milliLiter(s) (10 mL/Hr) IV Continuous <Continuous>    MEDICATIONS  (PRN):  acetaminophen    Suspension .. 650 milliGRAM(s) Oral every 6 hours PRN Temp greater or equal to 38C (100.4F)    LABS: All Lab data reviewed and analyzed                        7.8    33 )-----------( 227      ( 2021 07:29 )             24.6   07-    133<L>  |  100  |  20  ----------------------------<  95  3.9   |  22  |  0.76    Ca    9.1      2021 05:58  Phos  2.4     07-26  Mg     1.7     -    TPro  7.1  /  Alb  3.4  /  TBili  0.6  /  DBili  x   /  AST  18  /  ALT  15  /  AlkPhos  73  -      Ca    9.5      2021 06:11  Phos  2.9     07-24  Mg     2.0     07-24    TPro  7.5  /  Alb  3.8  /  TBili  0.4  /  DBili  x   /  AST  22  /  ALT  17  /  AlkPhos  72  07-      Ca    9.5      2021 07:23  Phos  2.9     07-24  Mg     2.0     07-24    TPro  7.7  /  Alb  3.8  /  TBili  0.6  /  DBili  x   /  AST  22  /  ALT  17  /  AlkPhos  76  07-24    Ca    9.5      2021 06:04  Phos  3.8     07-22  Mg     2.1     -    TPro  8.5<H>  /  Alb  4.0  /  TBili  0.2  /  DBili  x   /  AST  20  /  ALT  18  /  AlkPhos  96  -    Ca    9.9      2021 06:47  Phos  3.8     07-  Mg     2.1     -    TPro  8.5<H>  /  Alb  4.0  /  TBili  0.2  /  DBili  x   /  AST  20  /  ALT  18  /  AlkPhos  96  -    Ca    10.6<H>      2021 01:00  Phos  3.8     07-20  Mg     2.0     -    TPro  9.5<H>  /  Alb  4.7  /  TBili  0.3  /  DBili  x   /  AST  26  /  ALT  22  /  AlkPhos  108  -      Ca    10.6<H>      2021 00:28  Phos  4.0     07-  Mg     2.2     -    TPro  9.8<H>  /  Alb  4.9  /  TBili  0.3  /  DBili  x   /  AST  28  /  ALT  22  /  AlkPhos  114  -                                                     PTT - ( 2021 04:52 )  PTT:45.2 sec LIVER FUNCTIONS - ( 2021 00:42 )  Alb: 3.4 g/dL / Pro: 6.7 g/dL / ALK PHOS: 213 U/L / ALT: 15 U/L / AST: 24 U/L / GGT: x           RADIOLOGY: - Reviewed and analyzed LLL  pneumonia , LVAD HM2, CT scan of abdomen reviewed result noted

## 2021-07-26 NOTE — PROGRESS NOTE ADULT - PROBLEM SELECTOR PLAN 2
- Current speed 9200 RPM  - HOLD coumadin given concern of bleed  - Will plan to hold aspirin indefinitely given recurrent GIB.  - Continue to monitor LDH daily  - hypotension requiring levophed, low flows, please repeat TTE   - consider CVL for monitoring of CVP

## 2021-07-26 NOTE — PROVIDER CONTACT NOTE (OTHER) - ACTION/TREATMENT ORDERED:
CLAUDIA Perez came to bedside. TF were stopped and pt is being kept NPO except for vancomycin at MN and 0600 and amio at 0600.
MD Perry assessed patient at the bedside, as per MD Perry precedex increased to 1.5mcg, ketamine increased to 3mcg & cont. titrating for vent synchrony. Fio2 increased to 50% & ABG to be repeated
As per Dr. Cadet- pt transferred to CTU for further management. RN & NP transferred with pt on tele with emergency equipment.
continue monitoring
Albumin x2 ordered by CLAUDIA Burnett and given, flows increased to 4.8-5.0. Amio bolus and 2 another 2 albumin given later in shift
Vicky ordered 100 mg solumedrol, 25 mg benadryl IVP, 250 LR bolus, 1 albumin 250 cc, and Jeremy-Synephrine was started. Janet was placed and BC and labs were sent.

## 2021-07-26 NOTE — PROGRESS NOTE ADULT - SUBJECTIVE AND OBJECTIVE BOX
Patient seen and examined at bedside.    Overnight Events: ongoing melena overnight, remains in CTU, discussions w/ GI re further eval (tentative capsule). Febrile transfusion rxn overnight, intermittently requiring vaso.     Review Of Systems: No chest pain, shortness of breath, or palpitations            Current Meds:  acetaminophen    Suspension .. 650 milliGRAM(s) Oral every 6 hours PRN  aMIOdarone    Tablet 200 milliGRAM(s) Oral daily  clonazePAM  Tablet 0.5 milliGRAM(s) Oral at bedtime  dextrose 5% + sodium chloride 0.45%. 1000 milliLiter(s) IV Continuous <Continuous>  hydrALAZINE 25 milliGRAM(s) Oral every 8 hours PRN  insulin lispro (ADMELOG) corrective regimen sliding scale   SubCutaneous every 6 hours  metoprolol tartrate 25 milliGRAM(s) Oral three times a day  pantoprazole Infusion 8 mG/Hr IV Continuous <Continuous>  phenylephrine    Infusion 0.3 MICROgram(s)/kG/Min IV Continuous <Continuous>  simethicone 80 milliGRAM(s) Chew every 8 hours PRN  vancomycin    Solution 125 milliGRAM(s) Oral daily      Vitals:  T(F): 101 (07-26), Max: 102.4 (07-26)  HR: 96 (07-26) (81 - 117)  BP: 94/59 (07-26) (83/53 - 96/66)  RR: 25 (07-26)  SpO2: 100% (07-26)  I&O's Summary    25 Jul 2021 07:01  -  26 Jul 2021 07:00  --------------------------------------------------------  IN: 2323 mL / OUT: 950 mL / NET: 1373 mL    26 Jul 2021 07:01  -  26 Jul 2021 09:39  --------------------------------------------------------  IN: 172.7 mL / OUT: 0 mL / NET: 172.7 mL        Physical Exam:  Appearance: No acute distress; well appearing  Eyes: PERRL, EOMI, pink conjunctiva  HEENT: Normal oral mucosa  Cardiovascular: RRR, S1, S2, no murmurs, rubs, or gallops; no edema; no JVD  Respiratory: Clear to auscultation bilaterally  Gastrointestinal: soft, non-tender, non-distended with normal bowel sounds  Musculoskeletal: No clubbing; no joint deformity   Neurologic: Non-focal  Lymphatic: No lymphadenopathy  Psychiatry: AAOx3, mood & affect appropriate  Skin: No rashes, ecchymoses, or cyanosis                          7.8    20.33 )-----------( 227      ( 26 Jul 2021 07:29 )             24.6     07-26    133<L>  |  100  |  20  ----------------------------<  95  3.9   |  22  |  0.76    Ca    9.1      26 Jul 2021 05:58  Phos  2.4     07-26  Mg     1.7     07-26    TPro  7.1  /  Alb  3.4  /  TBili  0.6  /  DBili  x   /  AST  18  /  ALT  15  /  AlkPhos  73  07-26    PT/INR - ( 26 Jul 2021 05:58 )   PT: 32.3 sec;   INR: 2.83 ratio   Patient seen and examined at bedside.    Overnight Events: ongoing melena overnight, remains in CTU, discussions w/ GI re further eval (tentative capsule). Febrile transfusion rxn overnight, intermittently requiring vaso.     Review Of Systems: No chest pain, shortness of breath, or palpitations            Current Meds:  acetaminophen    Suspension .. 650 milliGRAM(s) Oral every 6 hours PRN  aMIOdarone    Tablet 200 milliGRAM(s) Oral daily  clonazePAM  Tablet 0.5 milliGRAM(s) Oral at bedtime  dextrose 5% + sodium chloride 0.45%. 1000 milliLiter(s) IV Continuous <Continuous>  hydrALAZINE 25 milliGRAM(s) Oral every 8 hours PRN  insulin lispro (ADMELOG) corrective regimen sliding scale   SubCutaneous every 6 hours  metoprolol tartrate 25 milliGRAM(s) Oral three times a day  pantoprazole Infusion 8 mG/Hr IV Continuous <Continuous>  phenylephrine    Infusion 0.3 MICROgram(s)/kG/Min IV Continuous <Continuous>  simethicone 80 milliGRAM(s) Chew every 8 hours PRN  vancomycin    Solution 125 milliGRAM(s) Oral daily      Vitals:  T(F): 101 (07-26), Max: 102.4 (07-26)  HR: 96 (07-26) (81 - 117)  BP: 94/59 (07-26) (83/53 - 96/66)  RR: 25 (07-26)  SpO2: 100% (07-26)  I&O's Summary    25 Jul 2021 07:01  -  26 Jul 2021 07:00  --------------------------------------------------------  IN: 2323 mL / OUT: 950 mL / NET: 1373 mL    26 Jul 2021 07:01  -  26 Jul 2021 09:39  --------------------------------------------------------  IN: 172.7 mL / OUT: 0 mL / NET: 172.7 mL        Physical Exam:  Appearance: No acute distress; well appearing  Cardiovascular: RRR, S1, S2, no murmurs, rubs, or gallops; no edema; no JVD  Respiratory: trach in place, coarse breath sounds   Gastrointestinal: soft, non-tender, non-distended with normal bowel sounds  Musculoskeletal: No clubbing; no joint deformity   Neurologic: Non-focal  Skin: No rashes, ecchymoses, or cyanosis                          7.8    20.33 )-----------( 227      ( 26 Jul 2021 07:29 )             24.6     07-26    133<L>  |  100  |  20  ----------------------------<  95  3.9   |  22  |  0.76    Ca    9.1      26 Jul 2021 05:58  Phos  2.4     07-26  Mg     1.7     07-26    TPro  7.1  /  Alb  3.4  /  TBili  0.6  /  DBili  x   /  AST  18  /  ALT  15  /  AlkPhos  73  07-26    PT/INR - ( 26 Jul 2021 05:58 )   PT: 32.3 sec;   INR: 2.83 ratio

## 2021-07-26 NOTE — PROGRESS NOTE ADULT - ASSESSMENT
66M with a history of stage D NICM s/p HM2 on 2017 as DT (due to severe PAD) with TV ring, prior COVID-19 infection 2020, recurrent syncope post LVAD s/p ILR, and chronic abdominal pain with prior negative workup  Admitted 21with symptomatic anemia with Hgb 4.5 in setting of INR 8.8 without hemodynamic instability. He was transfused and underwent VCE which showed active bleeding in the mid small bowel but subsequent enteroscopy 6/15 did not reveal any active bleeding.   He acutely decompensated after procedure with fever/hypertension, low flow alarms, and pulmonary infiltrate with hypoxia requiring intubation from probable aspiration PNA.   Course notable for inability to wean ventilator with persistent secretions for which he underwent tracheostomy    acute c diff colitis.     Antibiotics  IV Vanco 6/15,  -->  Zosyn 6/15 -->   PO Vanco 500 q 6  -->   PO Vanco 125 q 6h   IV Flagyl -->   Cefepime -->  Cefazolin --7  Vancomycin -    Positive Cdiff testin21    Clostridiodes difficile :  Abdomen not  distended- but soft-  diarrhea resolved  WBC now modestly increased  Continue po Vanco taper    GI bleed   blood transfusion in progress    Continue po Vanco taper:  BID  -->   Once daily:  -->   Once every other day:  -->   Once every third day: 8/3-->     Developed fevers/chills while receiving blood products in the setting of recurrent melena-  unclear if new infection vs transfusion reaction  blood cultures sent  CVC line placed/changed  Vancomycin IV started pending cultures    Morris Prater MD  [phone]  After 5pm/weekends [phone]

## 2021-07-26 NOTE — PROGRESS NOTE ADULT - PROBLEM SELECTOR PLAN 1
- continue metoprolol tartrate 25mg TID (hold for MAP < 65)  - hold diuretics in the setting of fluid losses and concern for possible bleed   - Continue home amiodarone 200 mg PO daily

## 2021-07-26 NOTE — PROCEDURE NOTE - ADDITIONAL PROCEDURE DETAILS
Right IJ 7F triple lumen CVC placed under ultrasound guidance. Guidewire recovered, confirmed by myself and nursing staff. Post-procedure CXR with.... Right IJ 7F triple lumen CVC placed under ultrasound guidance. Guidewire recovered, confirmed by Adamaris (resident) and nursing staff. Post-procedure CXR with.... Right IJ 7F triple lumen CVC placed under ultrasound guidance. Guidewire recovered, confirmed by Adamaris (resident) and nursing staff. Post-procedure CXR with central line in SVC, no pneumothorax. Right IJ 7F triple lumen CVC placed under ultrasound guidance. Guidewire recovered, confirmed by Adamaris (resident) and nursing staff. Right IJ 7F triple lumen CVC placed under ultrasound guidance. Guidewire recovered, confirmed by Adamaris (resident) and nursing staff. Post-procedure CXR with right sided pneumothorax, CVC tip in SVC. Right sided pigtail catheter to be placed. Right IJ 7F triple lumen CVC placed under ultrasound guidance. Guidewire recovered, confirmed by Adamaris (resident) and nursing staff. Post-procedure CXR with right sided pneumothorax, CVC tip in SVC. Right sided pigtail catheter to be placed for moderate size PTX.

## 2021-07-26 NOTE — PROGRESS NOTE ADULT - SUBJECTIVE AND OBJECTIVE BOX
RICKY HAMPTON  MRN-[id number]  Patient is a 66y old  Male who presents with a chief complaint of Anemia, Supratherapeutic INR, Dark Stools (2021 18:47)    HPI:  65M PMH ACC/AHA stage D HF due to NICM HM2 LVAD , TV annuloplasty ring 17 as destination therapy due to severe peripheral artery disease with significant stenosis  SIADH, Depression, CKD-3 with hyperkalemia, past E. coli UTIs, driveline drainage (21) and COVID-19 (back in 2020)  He was recently seen in clinic where he complained of abdominal pain and dark stools w constipation back in May. He presents to [facility] ER today weakness and fatigue, moderate and + Black stools for three days, on coumadin secondary to warfarin use in the setting of an LVAD. Patient has required transfusions for GIB in the past. Mostly recently back in 2021 pt had anemia with dark stools. No interventions was done at that time. However Last Endoscopy was done in 2020 (negative). Today labs show patient is anemic with H/H of 4.5/16.3,. INR is 8.84 MAP in the 90s, Temp 35.1. He denies any chest pain, shortness of breath, dizziness, abd pain, nausea or vomiting.       (2021 16:57)      Surgery/Hospital course:   admit for melena w/ anemia, INR 8.84   6/15 Capsul study (+) for small bowel bleed, balloon endoscopy (old blood in prox ileum); post EGD - septic w/ L opacity, re-intubated for concern for aspiration, TTE (Mod MR, decrease biV w/ interventricular septum boweing towards R)   bronch    +C Diff    TC    CT C/A/P: Fluid filled colon which may be 2/2 rapid transit. Small bilateral pleffs with associates. Compressive atelectasis New ISABELLE & LLL  parenchymal opacities, suspicious for pneumonia. 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    #8 Shiley trach at bedside    CPAP trials    LVAD speed increased to 9200   Bronch; Central line dc'ed   TC since , continue as tolerated. Patient transferred to SDU.    Cont PT, no trach downsize today(#8cuffed)/ Anticoagulation/ Continue TF, speech f/u/ Vanco taper    oob to chair  INR  2.47  5 mg coumadin     increased lop to 25 q8  per HF   INR today 2.64.  H&H 7.3 this AM.  Will repeat CBC at noon, and will send stool guaiac Patient with persistent abdominal tenderness, rate of tube feeds decreased.  No nausea/vomiting.     INR today 2.4H&H 9.1/28.6 low flow overnight /N&V  NPO resolved for capsule study  Coumadin on hold refusing Tube feeds on D5 / normal  @50 cc/hr   INR 2.69  H&H 7..1 refusing Tube feeds on D5 / normal  @50 cc/hr. This am + BM Anusha Oneill HF  aware- PRBC x1  GI team consulted -  NPO  plan on study in am-  D/w Dr Cadet Patient  to return to CTU for further management.    Today/Overnight:  Hypoxic this AM, TC->SIMV for respiratory support, continue as tolerated. Lopressor and Hydralazine dc'd this AM, required pressor support with IV Levophed and IV Vasopressin. Anemia due to acute blood loss associated with upper GI bleed s/p 1prbc and 1 FFP this AM, continue to monitor H&H/Plts closely. For endoscopy tomorrow with GI, NPO after MN.    Vital Signs Last 24 Hrs  T(C): 38.4 (2021 20:00), Max: 39.2 (2021 13:15)  T(F): 101.2 (2021 20:00), Max: 102.6 (2021 13:45)  HR: 95 (2021 20:45) (81 - 125)  BP: 94/59 (2021 08:00) (83/53 - 94/66)  BP(mean): 67 (2021 08:00) (63 - 75)  RR: 32 (2021 20:45) (14 - 83)  SpO2: 100% (2021 20:45) (68% - 100%)  ============================I/O===========================  I&O's Detail    2021 07:01  -  2021 07:00  --------------------------------------------------------  IN:    Albumin 5%  - 250 mL: 250 mL    dextrose 5% + sodium chloride 0.45%: 1150 mL    Enteral Tube Flush: 120 mL    Lactated Ringers Bolus: 250 mL    Pantoprazole: 180 mL    Phenylephrine: 73 mL    Plasma: 300 mL  Total IN: 2323 mL    OUT:    Miscellaneous Tube Feedin mL    Voided (mL): 950 mL  Total OUT: 950 mL    Total NET: 1373 mL      2021 07:01  -  2021 20:49  --------------------------------------------------------  IN:    Albumin 5%  - 250 mL: 800 mL    dextrose 5% + sodium chloride 0.45%: 650 mL    IV PiggyBack: 50 mL    multiple electrolytes Injection Type 1 Bolus: 500 mL    Norepinephrine: 21.9 mL    Pantoprazole: 130 mL    Phenylephrine: 260.1 mL    PRBCs (Packed Red Blood Cells): 300 mL    Vasopressin: 10 mL  Total IN: 2722 mL    OUT:    Chest Tube (mL): 25 mL    Miscellaneous Tube Feedin mL    Voided (mL): 500 mL  Total OUT: 525 mL    Total NET: 2197 mL        ============================ LABS =========================                        8.5    20.12 )-----------( 179      ( 2021 20:04 )             26.         133<L>  |  100  |  20  ----------------------------<  95  3.9   |  22  |  0.76    Ca    9.1      2021 05:58  Phos  2.4       Mg     1.7         TPro  7.1  /  Alb  3.4  /  TBili  0.6  /  DBili  x   /  AST  18  /  ALT  15  /  AlkPhos  73      LIVER FUNCTIONS - ( 2021 05:58 )  Alb: 3.4 g/dL / Pro: 7.1 g/dL / ALK PHOS: 73 U/L / ALT: 15 U/L / AST: 18 U/L / GGT: x           PT/INR - ( 2021 11:11 )   PT: 32.4 sec;   INR: 2.84 ratio         PTT - ( 2021 11:11 )  PTT:35.5 sec  ABG - ( 2021 19:38 )  pH, Arterial: 7.38  pH, Blood: x     /  pCO2: 31    /  pO2: 352   / HCO3: 18    / Base Excess: -5.8  /  SaO2: 100               Urinalysis Basic - ( 2021 08:44 )    Color: Yellow / Appearance: Clear / S.034 / pH: x  Gluc: x / Ketone: Moderate  / Bili: Small / Urobili: Negative   Blood: x / Protein: 30 mg/dL / Nitrite: Negative   Leuk Esterase: Negative / RBC: 1 /hpf / WBC 2 /HPF   Sq Epi: x / Non Sq Epi: 0 /hpf / Bacteria: Negative      ======================Micro/Rad/Cardio=================  Culture: Reviewed   CXR: Reviewed  Echo: Reviewed  ======================================================  PAST MEDICAL & SURGICAL HISTORY:  CHF (congestive heart failure)    CAD (coronary artery disease)    Depression    Pleural effusion    History of 2019 novel coronavirus disease (COVID-19)  2020    Hemorrhoids    Bleeding hemorrhoids    Peripheral arterial disease    Claudication    BPH with urinary obstruction    ACC/AHA stage D systolic heart failure    Anticoagulation goal of INR 2.0 to 2.5    Falls    Clavicle fracture    CKD (chronic kidney disease), stage III    Iron deficiency anemia    H/O epistaxis    Vertigo    GI bleed    S/P TVR (tricuspid valve repair)    S/P ventricular assist device    S/P endoscopy      ========================ASSESSMENT ================  Stage D Nonischemic Cardiomyopathy, Status Post HM2 on 2017    Cardiogenic shock  Hemodynamic instability   Acute hypoxemic respiratory failure  GI bleed , Status Post Enteroscopy   Anemia, in setting of melena   Chronic Kidney Disease  Stress hyperglycemia   C.diff positive on    Hypovolemic shock  Hyponatremia   Leukocytosis    Plan:  ====================== NEUROLOGY=====================  Nonfocal, continue to monitor neuro status    Tylenol PRN for analgesia   C/w clonazepam for agitation    acetaminophen    Suspension .. 650 milliGRAM(s) Oral every 6 hours PRN Mild Pain (1 - 3)  clonazePAM  Tablet 0.5 milliGRAM(s) Oral at bedtime    ==================== RESPIRATORY======================  Acute hypoxemic resp failure s/p #8 shiley trach on on ; Hypoxic this AM, TC->SIMV for respiratory support, continue as tolerated.   Continue close monitoring of respiratory rate and breathing pattern, following of ABG’s with A-line monitoring, continuous pulse oximetry monitoring.     Mechanical Ventilation:  Mode: AC/ CMV (Assist Control/ Continuous Mandatory Ventilation)  FiO2: 50      ====================CARDIOVASCULAR==================  Stage D Nonischemic Cardiomyopathy, Status Post HM2 on 2017; LVAD settings 9200 with flow of 3.4  TTE : EF 25-30%, mild LV enlargement, severe global LV dysfunction, normal RV size with decreased RV function  Continue invasive hemodynamic monitoring   Lopressor and Hydralazine dc'd this AM   Required pressor support with IV Levophed and IV Vasopressin   Rate control with amiodarone     aMIOdarone    Tablet 200 milliGRAM(s) Oral daily  norepinephrine Infusion 0.05 MICROgram(s)/kG/Min (7.36 mL/Hr) IV Continuous <Continuous>  vasopressin Infusion 0.017 Unit(s)/Min (1 mL/Hr) IV Continuous <Continuous>    ===================HEMATOLOGIC/ONC ===================  anemia due to acute blood loss associated with upper GI bleed s/p 1prbc and 1 FFP this AM  Continue to monitor hemoglobin and hematocrit levels.     ===================== RENAL =========================  Continue monitoring I&Os, BUN/Cr, and urine output   Replete lytes PRN. Keep K> 4 and Mg >2     ==================== GASTROINTESTINAL===================  GI bleed in setting of recent melena - for endoscopy tomorrow with GI  Tube feeds restarted this AM at trickle, NPO after MN   Continue IV Protonix drip for GI bleed     pantoprazole Infusion 8 mG/Hr (10 mL/Hr) IV Continuous <Continuous>  simethicone 80 milliGRAM(s) Chew every 8 hours PRN Dyspepsia    =======================    ENDOCRINE  =====================  Stress hyperglycemia, continue glucose control with admelog sliding scale     insulin lispro (ADMELOG) corrective regimen sliding scale   SubCutaneous every 6 hours    ========================INFECTIOUS DISEASE================  Temp 101.2F, WBC downtrending 21.63->20.12   Monitor temperature and trend WBC.   : C.Diff positive, c/w vancomycin solution tapes   Empiric coverage with cefazolin and vancomycin     ceFAZolin   IVPB 1000 milliGRAM(s) IV Intermittent every 8 hours  vancomycin    Solution 125 milliGRAM(s) Oral daily  vancomycin  IVPB 1000 milliGRAM(s) IV Intermittent every 12 hours      Patient requires continuous monitoring with bedside rhythm monitoring, pulse oximetry monitoring, and continuous central venous and arterial pressure monitoring; and intermittent blood gas analysis.  Care plan discussed with ICU care team.    Patient remained critical, at risk for life threatening decompensation.   I have spent 35 minutes providing acute care with multiple re-evaluations throughout the evening.     By signing my name below, I, Agnieszka Cherry, attest that this documentation has been prepared under the direction and in the presence of CLAUDIA Mejia   Electronically signed: Cesia Shaffer Brendan Walter, personally performed the services described in this documentation. All medical record entries made by the scribe were at my direction and in my presence. I have reviewed the chart and agree that the record reflects my personal performance and is accurate and complete  Electronically signed: CLAUDIA Mejia - @ 20:49       RICKY HAMPTON  MRN-[id number]  Patient is a 66y old  Male who presents with a chief complaint of Anemia, Supratherapeutic INR, Dark Stools (2021 18:47)    HPI:  65M PMH ACC/AHA stage D HF due to NICM HM2 LVAD , TV annuloplasty ring 17 as destination therapy due to severe peripheral artery disease with significant stenosis  SIADH, Depression, CKD-3 with hyperkalemia, past E. coli UTIs, driveline drainage (21) and COVID-19 (back in 2020)  He was recently seen in clinic where he complained of abdominal pain and dark stools w constipation back in May. He presents to [facility] ER today weakness and fatigue, moderate and + Black stools for three days, on coumadin secondary to warfarin use in the setting of an LVAD. Patient has required transfusions for GIB in the past. Mostly recently back in 2021 pt had anemia with dark stools. No interventions was done at that time. However Last Endoscopy was done in 2020 (negative). Today labs show patient is anemic with H/H of 4.5/16.3,. INR is 8.84 MAP in the 90s, Temp 35.1. He denies any chest pain, shortness of breath, dizziness, abd pain, nausea or vomiting.     (2021 16:57)    Surgery/Hospital course:   admit for melena w/ anemia, INR 8.84   6/15 Capsul study (+) for small bowel bleed, balloon endoscopy (old blood in prox ileum); post EGD - septic w/ L opacity, re-intubated for concern for aspiration, TTE (Mod MR, decrease biV w/ interventricular septum boweing towards R)   bronch    +C Diff    TC    CT C/A/P: Fluid filled colon which may be 2/2 rapid transit. Small bilateral pleffs with associates. Compressive atelectasis New ISABELLE & LLL  parenchymal opacities, suspicious for pneumonia. 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    #8 Shiley trach at bedside    CPAP trials    LVAD speed increased to 9200   Bronch; Central line dc'ed   TC since , continue as tolerated. Patient transferred to SDU.    Cont PT, no trach downsize today(#8cuffed)/ Anticoagulation/ Continue TF, speech f/u/ Vanco taper    oob to chair  INR  2.47  5 mg coumadin     increased lop to 25 q8  per HF   INR today 2.64.  H&H 7.324 this AM.  Will repeat CBC at noon, and will send stool guaiac Patient with persistent abdominal tenderness, rate of tube feeds decreased.  No nausea/vomiting.     INR today 2.4H&H 9.1/28.6 low flow overnight /N&V  NPO resolved for capsule study  Coumadin on hold refusing Tube feeds on D5 / normal  @50 cc/hr   INR 2.69  H&H 7..1 refusing Tube feeds on D5 /2 normal  @50 cc/hr. This am + BM Anusha Oneill HF  aware- PRBC x1  GI team consulted -  NPO  plan on study in am-  D/w Dr Cadet Patient  to return to CTU for further management.    Today/Overnight:  Hypoxic this AM, TC->SIMV for respiratory support, continue as tolerated. Lopressor and Hydralazine dc'd this AM, required pressor support with IV Levophed and IV Vasopressin. Anemia due to acute blood loss associated with upper GI bleed s/p 1prbc and 1 FFP this AM, continue to monitor H&H/Plts closely. Mod R PTX --> R PTC placed. Empiric Vanco + Cefepime started for fevers. Blood cultures + GNR, TTE performed this evening reveals at least moderate MR, mild AI, severe global LV syst dysfxn, dec RV fxn.    Vital Signs Last 24 Hrs  T(C): 38.4 (2021 20:00), Max: 39.2 (2021 13:15)  T(F): 101.2 (2021 20:00), Max: 102.6 (2021 13:45)  HR: 95 (2021 20:45) (81 - 125)  BP: 94/59 (2021 08:00) (83/53 - 94/66)  BP(mean): 67 (2021 08:00) (63 - 75)  RR: 32 (2021 20:45) (14 - 83)  SpO2: 100% (2021 20:45) (68% - 100%)    ============================I/O===========================  I&O's Detail    2021 07:01  -  2021 07:00  --------------------------------------------------------  IN:    Albumin 5%  - 250 mL: 250 mL    dextrose 5% + sodium chloride 0.45%: 1150 mL    Enteral Tube Flush: 120 mL    Lactated Ringers Bolus: 250 mL    Pantoprazole: 180 mL    Phenylephrine: 73 mL    Plasma: 300 mL  Total IN: 2323 mL    OUT:    Miscellaneous Tube Feedin mL    Voided (mL): 950 mL  Total OUT: 950 mL    Total NET: 1373 mL    2021 07:01  -  2021 20:49  --------------------------------------------------------  IN:    Albumin 5%  - 250 mL: 800 mL    dextrose 5% + sodium chloride 0.45%: 650 mL    IV PiggyBack: 50 mL    multiple electrolytes Injection Type 1 Bolus: 500 mL    Norepinephrine: 21.9 mL    Pantoprazole: 130 mL    Phenylephrine: 260.1 mL    PRBCs (Packed Red Blood Cells): 300 mL    Vasopressin: 10 mL  Total IN: 2722 mL    OUT:    Chest Tube (mL): 25 mL    Miscellaneous Tube Feedin mL    Voided (mL): 500 mL  Total OUT: 525 mL    Total NET: 2197 mL    ============================ LABS =========================                        8.5    20.12 )-----------( 179      ( 2021 20:04 )             26.5     133<L>  |  100  |  20  ----------------------------<  95  3.9   |  22  |  0.76    Ca    9.1      2021 05:58  Phos  2.4       Mg     1.7         TPro  7.1  /  Alb  3.4  /  TBili  0.6  /  DBili  x   /  AST  18  /  ALT  15  /  AlkPhos  73      LIVER FUNCTIONS - ( 2021 05:58 )  Alb: 3.4 g/dL / Pro: 7.1 g/dL / ALK PHOS: 73 U/L / ALT: 15 U/L / AST: 18 U/L / GGT: x           PT/INR - ( 2021 11:11 )   PT: 32.4 sec;   INR: 2.84 ratio      PTT - ( 2021 11:11 )  PTT:35.5 sec  ABG - ( 2021 19:38 )  pH, Arterial: 7.38  pH, Blood: x     /  pCO2: 31    /  pO2: 352   / HCO3: 18    / Base Excess: -5.8  /  SaO2: 100       Urinalysis Basic - ( 2021 08:44 )    Color: Yellow / Appearance: Clear / S.034 / pH: x  Gluc: x / Ketone: Moderate  / Bili: Small / Urobili: Negative   Blood: x / Protein: 30 mg/dL / Nitrite: Negative   Leuk Esterase: Negative / RBC: 1 /hpf / WBC 2 /HPF   Sq Epi: x / Non Sq Epi: 0 /hpf / Bacteria: Negative    ======================Micro/Rad/Cardio=================  Culture: Reviewed   CXR: Reviewed  Echo: Reviewed    ======================================================  PAST MEDICAL & SURGICAL HISTORY:  CHF (congestive heart failure)    CAD (coronary artery disease)    Depression    Pleural effusion    History of 2019 novel coronavirus disease (COVID-19)  2020    Hemorrhoids    Bleeding hemorrhoids    Peripheral arterial disease    Claudication    BPH with urinary obstruction    ACC/AHA stage D systolic heart failure    Anticoagulation goal of INR 2.0 to 2.5    Falls    Clavicle fracture    CKD (chronic kidney disease), stage III    Iron deficiency anemia    H/O epistaxis    Vertigo    GI bleed    S/P TVR (tricuspid valve repair)    S/P ventricular assist device    S/P endoscopy    ========================ASSESSMENT ================  Stage D Nonischemic Cardiomyopathy, Status Post HM2 on 2017    Cardiogenic shock  Hemodynamic instability   Acute hypoxemic respiratory failure  GI bleed , Status Post Enteroscopy   Anemia, in setting of melena   Chronic Kidney Disease  Stress hyperglycemia   C.diff positive on    Hypovolemic shock  Hyponatremia   Leukocytosis    Plan:  ====================== NEUROLOGY=====================  Nonfocal, continue to monitor neuro status    Tylenol PRN for analgesia   C/w clonazepam for agitation    acetaminophen    Suspension .. 650 milliGRAM(s) Oral every 6 hours PRN Mild Pain (1 - 3)  clonazePAM  Tablet 0.5 milliGRAM(s) Oral at bedtime    ==================== RESPIRATORY======================  Acute hypoxemic resp failure s/p #8 shiley trach on on ; Hypoxic this AM, TC->SIMV for respiratory support, continue as tolerated.   Continue close monitoring of respiratory rate and breathing pattern, following of ABG’s with A-line monitoring, continuous pulse oximetry monitoring.   R PTC for mod R PTX    Mechanical Ventilation:  Mode: AC/ CMV (Assist Control/ Continuous Mandatory Ventilation)  FiO2: 50    ====================CARDIOVASCULAR==================  Stage D Nonischemic Cardiomyopathy, Status Post HM2 on 2017; LVAD settings 9200 with flow of 3.4  TTE : EF 25-30%, mild LV enlargement, severe global LV dysfunction, normal RV size with decreased RV function  Continue invasive hemodynamic monitoring   Lopressor and Hydralazine dc'd this AM   Required pressor support with IV Levophed and IV Vasopressin   Rate control with amiodarone   TTE performed this evening reveals at least moderate MR, mild AI, severe global LV syst dysfxn, dec RV fxn.    aMIOdarone    Tablet 200 milliGRAM(s) Oral daily  norepinephrine Infusion 0.05 MICROgram(s)/kG/Min (7.36 mL/Hr) IV Continuous <Continuous>  vasopressin Infusion 0.017 Unit(s)/Min (1 mL/Hr) IV Continuous <Continuous>    ===================HEMATOLOGIC/ONC ===================  anemia due to acute blood loss associated with upper GI bleed s/p 1prbc and 1 FFP this AM  Continue to monitor hemoglobin and hematocrit levels.     ===================== RENAL =========================  Continue monitoring I&Os, BUN/Cr, and urine output   Replete lytes PRN. Keep K> 4 and Mg >2     ==================== GASTROINTESTINAL===================  GI bleed in setting of recent melena, GI following  Tube feeds restarted this AM at trice, NPO after MN   Continue IV Protonix drip for GI bleed     pantoprazole Infusion 8 mG/Hr (10 mL/Hr) IV Continuous <Continuous>  simethicone 80 milliGRAM(s) Chew every 8 hours PRN Dyspepsia    =======================    ENDOCRINE  =====================  Stress hyperglycemia, continue glucose control with admelog sliding scale     insulin lispro (ADMELOG) corrective regimen sliding scale   SubCutaneous every 6 hours    ========================INFECTIOUS DISEASE================  Temp 101.2F, WBC downtrending 21.63->20.12   Monitor temperature and trend WBC.   : C.Diff positive, c/w PO vancomycin   Empiric coverage with cefepime and vancomycin, will f/u with ID    cefepime IVPB 1000 milliGRAM(s) IV Intermittent every 8 hours  vancomycin    Solution 125 milliGRAM(s) Oral daily  vancomycin  IVPB 1000 milliGRAM(s) IV Intermittent every 12 hours    Patient requires continuous monitoring with bedside rhythm monitoring, pulse oximetry monitoring, and continuous central venous and arterial pressure monitoring; and intermittent blood gas analysis.  Care plan discussed with ICU care team.    Patient remained critical, at risk for life threatening decompensation.   I have spent 40 minutes providing acute care with multiple re-evaluations throughout the evening.     By signing my name below, I, Agnieszka Cherry, attest that this documentation has been prepared under the direction and in the presence of CLAUDIA Mejia   Electronically signed: Cesia Shaffer, Go Sellers, personally performed the services described in this documentation. All medical record entries made by the scribe were at my direction and in my presence. I have reviewed the chart and agree that the record reflects my personal performance and is accurate and complete  Electronically signed: CLAUDIA Mejia 21 @ 20:49

## 2021-07-26 NOTE — PROCEDURE NOTE - NSINFORMCONSENT_GEN_A_CORE
Benefits, risks, and possible complications of procedure explained to patient/caregiver who verbalized understanding and gave verbal consent. This was an emergent procedure.

## 2021-07-26 NOTE — PROCEDURE NOTE - NSPOSTPRCRAD_GEN_A_CORE
CXR pending w/ Right PTX/central line located in the superior vena cava/post-procedure radiography performed

## 2021-07-26 NOTE — PROGRESS NOTE ADULT - SUBJECTIVE AND OBJECTIVE BOX
Patient is a 66y old  Male who presents with a chief complaint of Anemia, Supratherapeutic INR, Dark Stools (2021 10:44)      Interval Events:   Patient on warfarin and INR 3.32 overnight.  1 unit FFP given with reported transfusion reaction afterwards with fever, hypotension, tachycardia.  Patient currently on low dose phenylephrine.    Hospital Medications:  acetaminophen    Suspension .. 650 milliGRAM(s) Oral every 6 hours PRN  aMIOdarone    Tablet 200 milliGRAM(s) Oral daily  clonazePAM  Tablet 0.5 milliGRAM(s) Oral at bedtime  dextrose 5% + sodium chloride 0.45%. 1000 milliLiter(s) IV Continuous <Continuous>  hydrALAZINE 25 milliGRAM(s) Oral every 8 hours PRN  insulin lispro (ADMELOG) corrective regimen sliding scale   SubCutaneous every 6 hours  metoprolol tartrate 25 milliGRAM(s) Oral three times a day  pantoprazole Infusion 8 mG/Hr IV Continuous <Continuous>  phenylephrine    Infusion 0.3 MICROgram(s)/kG/Min IV Continuous <Continuous>  simethicone 80 milliGRAM(s) Chew every 8 hours PRN  vancomycin    Solution 125 milliGRAM(s) Oral daily    ROS:   A 12-point ROS was performed and negative except as noted in HPI.    PHYSICAL EXAM:   Vital Signs:  Vital Signs Last 24 Hrs  T(C): 38.3 (2021 08:00), Max: 39.1 (2021 05:15)  T(F): 101 (2021 08:00), Max: 102.4 (2021 05:15)  HR: 96 (2021 09:00) (81 - 117)  BP: 94/59 (2021 08:00) (83/53 - 96/66)  BP(mean): 67 (2021 08:00) (63 - 77)  RR: 25 (2021 09:00) (14 - 72)  SpO2: 100% (2021 09:00) (92% - 100%)  Daily     Daily Weight in k.7 (2021 00:00)    GENERAL: no acute distress  NEURO: alert  HEENT: anicteric sclera, no conjunctival pallor appreciated  CHEST: no respiratory distress, no accessory muscle use  CARDIAC: regular rate, +S1/S2  ABDOMEN: soft, nondistended, nontender, no rebound or guarding  EXTREMITIES: warm, well perfused, no edema  SKIN: no lesions noted    LABS: reviewed                        7.8    20.33 )-----------( 227      ( 2021 07:29 )             24.6         133<L>  |  100  |  20  ----------------------------<  95  3.9   |  22  |  0.76    Ca    9.1      2021 05:58  Phos  2.4       Mg     1.7         TPro  7.1  /  Alb  3.4  /  TBili  0.6  /  DBili  x   /  AST  18  /  ALT  15  /  AlkPhos  73      LIVER FUNCTIONS - ( 2021 05:58 )  Alb: 3.4 g/dL / Pro: 7.1 g/dL / ALK PHOS: 73 U/L / ALT: 15 U/L / AST: 18 U/L / GGT: x             Interval Diagnostic Studies: see sunrise for full report   Patient is a 66y old  Male who presents with a chief complaint of Anemia, Supratherapeutic INR, Dark Stools (2021 10:44)      Interval Events:   Patient on warfarin and INR 3.32 overnight.  1 unit FFP given with reported transfusion reaction afterwards with fever, hypotension, tachycardia.  Patient currently on low dose phenylephrine.    Hospital Medications:  acetaminophen    Suspension .. 650 milliGRAM(s) Oral every 6 hours PRN  aMIOdarone    Tablet 200 milliGRAM(s) Oral daily  clonazePAM  Tablet 0.5 milliGRAM(s) Oral at bedtime  dextrose 5% + sodium chloride 0.45%. 1000 milliLiter(s) IV Continuous <Continuous>  hydrALAZINE 25 milliGRAM(s) Oral every 8 hours PRN  insulin lispro (ADMELOG) corrective regimen sliding scale   SubCutaneous every 6 hours  metoprolol tartrate 25 milliGRAM(s) Oral three times a day  pantoprazole Infusion 8 mG/Hr IV Continuous <Continuous>  phenylephrine    Infusion 0.3 MICROgram(s)/kG/Min IV Continuous <Continuous>  simethicone 80 milliGRAM(s) Chew every 8 hours PRN  vancomycin    Solution 125 milliGRAM(s) Oral daily    ROS:   A 12-point ROS was performed and negative except as noted in HPI.    PHYSICAL EXAM:   Vital Signs:  Vital Signs Last 24 Hrs  T(C): 38.3 (2021 08:00), Max: 39.1 (2021 05:15)  T(F): 101 (2021 08:00), Max: 102.4 (2021 05:15)  HR: 96 (2021 09:00) (81 - 117)  BP: 94/59 (2021 08:00) (83/53 - 96/66)  BP(mean): 67 (2021 08:00) (63 - 77)  RR: 25 (2021 09:00) (14 - 72)  SpO2: 100% (2021 09:00) (92% - 100%)  Daily     Daily Weight in k.7 (2021 00:00)    GENERAL: no acute distress  NEURO: alert  HEENT: anicteric sclera, no conjunctival pallor appreciated, s/p trach  CHEST: no respiratory distress, no accessory muscle use  CARDIAC: regular rate, +S1/S2  ABDOMEN: soft, nondistended, nontender, no rebound or guarding  EXTREMITIES: warm, well perfused, no edema  SKIN: no lesions noted    LABS: reviewed                        7.8    20.33 )-----------( 227      ( 2021 07:29 )             24.6         133<L>  |  100  |  20  ----------------------------<  95  3.9   |  22  |  0.76    Ca    9.1      2021 05:58  Phos  2.4       Mg     1.7         TPro  7.1  /  Alb  3.4  /  TBili  0.6  /  DBili  x   /  AST  18  /  ALT  15  /  AlkPhos  73      LIVER FUNCTIONS - ( 2021 05:58 )  Alb: 3.4 g/dL / Pro: 7.1 g/dL / ALK PHOS: 73 U/L / ALT: 15 U/L / AST: 18 U/L / GGT: x             Interval Diagnostic Studies: see sunrise for full report

## 2021-07-26 NOTE — PROGRESS NOTE ADULT - ASSESSMENT
65 YO M with a history of stage D NICM s/p HM2 on 9/2017 as DT (due to severe PAD) with TV ring, prior COVID-19 infection 4/2020, recurrent syncope post LVAD s/p ILR, and chronic abdominal pain with prior negative workup who was admitted with symptomatic anemia with Hgb 4.5 in setting of INR 8.8 without hemodynamic instability. He was transfused and underwent VCE which showed active bleeding in the mid small bowel but subsequent enteroscopy 6/15 did not reveal any active bleeding. He acutely decompensated after procedure with fever/hypertension, low flow alarms, and pulmonary infiltrate with hypoxia requiring intubation from probable aspiration PNA. His LDH is normal and there are no signs of overt LVAD dysfunction on echo though signs of insufficiently unloaded ventricle for which his speed has been increased. Course notable for inability to wean ventilator with persistent secretions for which he underwent tracheostomy as well as acute c diff colitis. Worsening anemia over last few days requiring transfusion with low flow alarms, concerning for recurrent GI bleed.     Primary goals at this time is to progress towards tracheostomy decannulation, control GI bleed and continue physical therapy for severe deconditioning.

## 2021-07-26 NOTE — PROGRESS NOTE ADULT - PROBLEM SELECTOR PLAN 3
- No active bleeding seen on past enteroscopy  - more recently, downtrending Hgb despite therapeutic INR   - s/p 2U PRBC 7/23 for Hgb 6.7 with appropriate response  - positive stool guaiac 7/23  - plan for repeat VCE 7/26  - Continue PPI  - transfuse PRN  - appreciate GI recs

## 2021-07-27 NOTE — PROGRESS NOTE ADULT - SUBJECTIVE AND OBJECTIVE BOX
RICKY HAMPTON  MRN-[id number]  Patient is a 66y old  Male who presents with a chief complaint of Anemia, Supratherapeutic INR, Dark Stools (2021 18:47)    HPI:  65M PMH ACC/AHA stage D HF due to NICM HM2 LVAD , TV annuloplasty ring 17 as destination therapy due to severe peripheral artery disease with significant stenosis  SIADH, Depression, CKD-3 with hyperkalemia, past E. coli UTIs, driveline drainage (21) and COVID-19 (back in 2020)  He was recently seen in clinic where he complained of abdominal pain and dark stools w constipation back in May. He presents to [facility] ER today weakness and fatigue, moderate and + Black stools for three days, on coumadin secondary to warfarin use in the setting of an LVAD. Patient has required transfusions for GIB in the past. Mostly recently back in 2021 pt had anemia with dark stools. No interventions was done at that time. However Last Endoscopy was done in 2020 (negative). Today labs show patient is anemic with H/H of 4.5/16.3,. INR is 8.84 MAP in the 90s, Temp 35.1. He denies any chest pain, shortness of breath, dizziness, abd pain, nausea or vomiting.     (2021 16:57)    Surgery/Hospital course:   admit for melena w/ anemia, INR 8.84   6/15 Capsul study (+) for small bowel bleed, balloon endoscopy (old blood in prox ileum); post EGD - septic w/ L opacity, re-intubated for concern for aspiration, TTE (Mod MR, decrease biV w/ interventricular septum boweing towards R)   bronch    +C Diff    TC    CT C/A/P: Fluid filled colon which may be 2/2 rapid transit. Small bilateral pleffs with associates. Compressive atelectasis New ISABELLE & LLL  parenchymal opacities, suspicious for pneumonia. 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    #8 Shiley trach at bedside    CPAP trials    LVAD speed increased to 9200   Bronch; Central line dc'ed   TC since , continue as tolerated. Patient transferred to SDU.    Cont PT, no trach downsize today(#8cuffed)/ Anticoagulation/ Continue TF, speech f/u/ Vanco taper    oob to chair  INR  2.47  5 mg coumadin     increased lop to 25 q8  per HF   INR today 2.64.  H&H 7.3 this AM.  Will repeat CBC at noon, and will send stool guaiac Patient with persistent abdominal tenderness, rate of tube feeds decreased.  No nausea/vomiting.     INR today 2.4H&H 9.1/28.6 low flow overnight /N&V  NPO resolved for capsule study  Coumadin on hold refusing Tube feeds on D5 / normal  @50 cc/hr   INR 2.69  H&H 7..1 refusing Tube feeds on D5 /2 normal  @50 cc/hr. This am + BM Anusha Oneill HF  aware- PRBC x1  GI team consulted -  NPO  plan on study in am-  D/w Dr Cadet Patient  to return to CTU for further management.   increasing vasopressor requirement, blood cultures positive for gram negative rods    Today/Overnight:  Hypoxic this AM, TC->SIMV for respiratory support, continue as tolerated. Lopressor and Hydralazine dc'd this AM, required pressor support with IV Levophed and IV Vasopressin. Anemia due to acute blood loss associated with upper GI bleed s/p 1prbc and 1 FFP this AM, continue to monitor H&H/Plts closely. Mod R PTX --> R PTC placed. Empiric Vanco + Cefepime started for fevers. Blood cultures + GNR, TTE performed this evening reveals at least moderate MR, mild AI, severe global LV syst dysfxn, dec RV fxn.    Vital Signs Last 24 Hrs  T(C): 38.4 (2021 20:00), Max: 39.2 (2021 13:15)  T(F): 101.2 (2021 20:00), Max: 102.6 (2021 13:45)  HR: 95 (2021 20:45) (81 - 125)  BP: 94/59 (2021 08:00) (83/53 - 94/66)  BP(mean): 67 (2021 08:00) (63 - 75)  RR: 32 (2021 20:45) (14 - 83)  SpO2: 100% (2021 20:45) (68% - 100%)    ============================I/O===========================  I&O's Detail    2021 07:01  -  2021 07:00  --------------------------------------------------------  IN:    Albumin 5%  - 250 mL: 250 mL    dextrose 5% + sodium chloride 0.45%: 1150 mL    Enteral Tube Flush: 120 mL    Lactated Ringers Bolus: 250 mL    Pantoprazole: 180 mL    Phenylephrine: 73 mL    Plasma: 300 mL  Total IN: 2323 mL    OUT:    Miscellaneous Tube Feedin mL    Voided (mL): 950 mL  Total OUT: 950 mL    Total NET: 1373 mL    2021 07:01  -  2021 20:49  --------------------------------------------------------  IN:    Albumin 5%  - 250 mL: 800 mL    dextrose 5% + sodium chloride 0.45%: 650 mL    IV PiggyBack: 50 mL    multiple electrolytes Injection Type 1 Bolus: 500 mL    Norepinephrine: 21.9 mL    Pantoprazole: 130 mL    Phenylephrine: 260.1 mL    PRBCs (Packed Red Blood Cells): 300 mL    Vasopressin: 10 mL  Total IN: 2722 mL    OUT:    Chest Tube (mL): 25 mL    Miscellaneous Tube Feedin mL    Voided (mL): 500 mL  Total OUT: 525 mL    Total NET: 2197 mL    ============================ LABS =========================                        8.5    20.12 )-----------( 179      ( 2021 20:04 )             26.5     133<L>  |  100  |  20  ----------------------------<  95  3.9   |  22  |  0.76    Ca    9.1      2021 05:58  Phos  2.4       Mg     1.7         TPro  7.1  /  Alb  3.4  /  TBili  0.6  /  DBili  x   /  AST  18  /  ALT  15  /  AlkPhos  73      LIVER FUNCTIONS - ( 2021 05:58 )  Alb: 3.4 g/dL / Pro: 7.1 g/dL / ALK PHOS: 73 U/L / ALT: 15 U/L / AST: 18 U/L / GGT: x           PT/INR - ( 2021 11:11 )   PT: 32.4 sec;   INR: 2.84 ratio      PTT - ( 2021 11:11 )  PTT:35.5 sec  ABG - ( 2021 19:38 )  pH, Arterial: 7.38  pH, Blood: x     /  pCO2: 31    /  pO2: 352   / HCO3: 18    / Base Excess: -5.8  /  SaO2: 100       Urinalysis Basic - ( 2021 08:44 )    Color: Yellow / Appearance: Clear / S.034 / pH: x  Gluc: x / Ketone: Moderate  / Bili: Small / Urobili: Negative   Blood: x / Protein: 30 mg/dL / Nitrite: Negative   Leuk Esterase: Negative / RBC: 1 /hpf / WBC 2 /HPF   Sq Epi: x / Non Sq Epi: 0 /hpf / Bacteria: Negative    ======================Micro/Rad/Cardio=================  Culture: Reviewed   CXR: Reviewed  Echo: Reviewed    ======================================================  PAST MEDICAL & SURGICAL HISTORY:  CHF (congestive heart failure)    CAD (coronary artery disease)    Depression    Pleural effusion    History of 2019 novel coronavirus disease (COVID-19)  2020    Hemorrhoids    Bleeding hemorrhoids    Peripheral arterial disease    Claudication    BPH with urinary obstruction    ACC/AHA stage D systolic heart failure    Anticoagulation goal of INR 2.0 to 2.5    Falls    Clavicle fracture    CKD (chronic kidney disease), stage III    Iron deficiency anemia    H/O epistaxis    Vertigo    GI bleed    S/P TVR (tricuspid valve repair)    S/P ventricular assist device    S/P endoscopy    ========================ASSESSMENT ================  Stage D Nonischemic Cardiomyopathy, Status Post HM2 on 2017    Cardiogenic shock  Hemodynamic instability   Acute hypoxemic respiratory failure  GI bleed , Status Post Enteroscopy   Anemia, in setting of melena   Chronic Kidney Disease  Stress hyperglycemia   C.diff positive on    Hypovolemic shock  Hyponatremia   Leukocytosis    Plan:  ====================== NEUROLOGY=====================  Nonfocal, continue to monitor neuro status    Tylenol PRN for analgesia   C/w clonazepam for agitation    acetaminophen    Suspension .. 650 milliGRAM(s) Oral every 6 hours PRN Mild Pain (1 - 3)  clonazePAM  Tablet 0.5 milliGRAM(s) Oral at bedtime    ==================== RESPIRATORY======================  Acute hypoxemic resp failure s/p #8 shiley trach on on ; Hypoxic this AM, TC->SIMV for respiratory support, continue as tolerated.   Continue close monitoring of respiratory rate and breathing pattern, following of ABG’s with A-line monitoring, continuous pulse oximetry monitoring.   R PTC for mod R PTX    Mode: AC/ CMV (Assist Control/ Continuous Mandatory Ventilation)  RR (machine): 16  TV (machine): 500  FiO2: 50  PEEP: 5  ITime: 1  MAP: 14  PIP: 24    ====================CARDIOVASCULAR==================  Stage D Nonischemic Cardiomyopathy, Status Post HM2 on 2017; LVAD settings 9200 with flow of 3.4  TTE : EF 25-30%, mild LV enlargement, severe global LV dysfunction, normal RV size with decreased RV function, repeat echo last evening shows ongoing decreased biventricular function.  In addition, patient has had two blood cultures positive for Serratia, follow up cultures pending. Acute deterioration in part due to gram negative sepsis.  Continue invasive hemodynamic monitoring   Lopressor and Hydralazine dc'd     Required pressor support with IV Levophed and IV Vasopressin   Rate control with amiodarone   TTE performed this evening reveals at least moderate MR, mild AI, severe global LV syst dysfxn, dec RV fxn.    aMIOdarone    Tablet 200 milliGRAM(s) Oral daily  norepinephrine Infusion 0.05 MICROgram(s)/kG/Min (7.36 mL/Hr) IV Continuous <Continuous>  vasopressin Infusion 0.017 Unit(s)/Min (1 mL/Hr) IV Continuous <Continuous>    ===================HEMATOLOGIC/ONC ===================  anemia due to acute blood loss associated with upper GI bleed s/p 1prbc and 1 FFP this AM  Continue to monitor hemoglobin and hematocrit levels.     ===================== RENAL =========================  Continue monitoring I&Os, BUN/Cr, and urine output   Replete lytes PRN. Keep K> 4 and Mg >2     ==================== GASTROINTESTINAL===================  GI bleed in setting of recent melena, GI following  Enteral feeding on hold  Continue IV Protonix drip for GI bleed     pantoprazole Infusion 8 mG/Hr (10 mL/Hr) IV Continuous <Continuous>  simethicone 80 milliGRAM(s) Chew every 8 hours PRN Dyspepsia    =======================    ENDOCRINE  =====================  Stress hyperglycemia required initiation of an IV insulin infusion with hourly glucose checks and titration as per insulin drip protocol.    ========================INFECTIOUS DISEASE================  Temp 101.2F, WBC downtrending 21.63->20.12   Monitor temperature and trend WBC.   : C.Diff positive, c/w PO vancomycin   Empiric coverage with cefepime and vancomycin, will f/u with ID    cefepime IVPB 1000 milliGRAM(s) IV Intermittent every 8 hours  vancomycin    Solution 125 milliGRAM(s) Oral daily  vancomycin  IVPB 1000 milliGRAM(s) IV Intermittent every 12 hours    Patient requires continuous monitoring with bedside rhythm monitoring, pulse oximetry monitoring, and continuous central venous and arterial pressure monitoring; and intermittent blood gas analysis.  Care plan discussed with ICU care team.    Patient remained critical, at risk for life threatening decompensation.   I have spent 55 minutes providing acute care with multiple re-evaluations overnight and during the early morning hours.    RICKY HAMPTON  MRN-[id number]  Patient is a 66y old  Male who presents with a chief complaint of Anemia, Supratherapeutic INR, Dark Stools (26 Jul 2021 18:47)    HPI:  65M PMH ACC/AHA stage D HF due to NICM HM2 LVAD , TV annuloplasty ring 9/12/17 as destination therapy due to severe peripheral artery disease with significant stenosis  SIADH, Depression, CKD-3 with hyperkalemia, past E. coli UTIs, driveline drainage (1/7/21) and COVID-19 (back in April 2020)  He was recently seen in clinic where he complained of abdominal pain and dark stools w constipation back in May. He presents to [facility] ER today weakness and fatigue, moderate and + Black stools for three days, on coumadin secondary to warfarin use in the setting of an LVAD. Patient has required transfusions for GIB in the past. Mostly recently back in jan 2021 pt had anemia with dark stools. No interventions was done at that time. However Last Endoscopy was done in July 2020 (negative). Today labs show patient is anemic with H/H of 4.5/16.3,. INR is 8.84 MAP in the 90s, Temp 35.1. He denies any chest pain, shortness of breath, dizziness, abd pain, nausea or vomiting.     (14 Jun 2021 16:57)    Surgery/Hospital course:  6/14 admit for melena w/ anemia, INR 8.84   6/15 Capsul study (+) for small bowel bleed, balloon endoscopy (old blood in prox ileum); post EGD - septic w/ L opacity, re-intubated for concern for aspiration, TTE (Mod MR, decrease biV w/ interventricular septum boweing towards R)  6/17 bronch   6/20 +C Diff   6/22 TC   6/22 CT C/A/P: Fluid filled colon which may be 2/2 rapid transit. Small bilateral pleffs with associates. Compressive atelectasis New ISABELLE & LLL  parenchymal opacities, suspicious for pneumonia. 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   6/25 #8 Shiley trach at bedside   6/28 CPAP trials   7/1 LVAD speed increased to 9200  7/2 Bronch; Central line dc'ed  7/20 TC since 7/7, continue as tolerated. Patient transferred to SDU.   7/21 Cont PT, no trach downsize today(#8cuffed)/ Anticoagulation/ Continue TF, speech f/u/ Vanco taper  7/22  oob to chair  INR  2.47  5 mg coumadin     increased lop to 25 q8  per HF  7/23 INR today 2.64.  H&H 7.3/24 this AM.  Will repeat CBC at noon, and will send stool guaiac Patient with persistent abdominal tenderness, rate of tube feeds decreased.  No nausea/vomiting.    7/24 INR today 2.4H&H 9.1/28.6 low flow overnight /N&V  NPO resolved for capsule study mondayn Coumadin on hold refusing Tube feeds on D5 1/2 normal  @50 cc/hr  7/25 INR 2.69  H&H 7.7/25.1 refusing Tube feeds on D5 1/2 normal  @50 cc/hr. This am + BM Anusha Oneill HF  aware- PRBC x1  GI team consulted -  NPO  plan on study in am-  D/w Dr Cadet Patient  to return to CTU for further management.  7/26 returned to full ventilator support, increasing vasopressor requirement, Lopressor and Hydralazine dc'd , blood cultures positive for gram negative rods. Mod R PTX --> R PTC placed. Empiric Vanco + Cefepime started for fevers and positive blood cultures. TTE reveals at least moderate MR, mild AI, severe global LV syst dysfxn, dec RV fxn.    ICU Vital Signs Last 24 Hrs  T(C): 39.6 (27 Jul 2021 04:00), Max: 41 (27 Jul 2021 00:00)  T(F): 103.3 (27 Jul 2021 04:00), Max: 105.8 (27 Jul 2021 00:00)  HR: 110 (27 Jul 2021 05:45) (87 - 125)  BP: 94/59 (26 Jul 2021 08:00) (94/59 - 94/59)  BP(mean): 67 (26 Jul 2021 08:00) (67 - 67)  ABP: 101/76 (27 Jul 2021 05:45) (64/59 - 121/68)  ABP(mean): 85 (27 Jul 2021 05:45) (56 - 94)  RR: 32 (27 Jul 2021 05:45) (18 - 83)  SpO2: 96% (27 Jul 2021 04:15) (59% - 100%)    ============================I/O===========================  I&O's Summary    25 Jul 2021 07:01  -  26 Jul 2021 07:00  --------------------------------------------------------  IN: 2323 mL / OUT: 950 mL / NET: 1373 mL    26 Jul 2021 07:01  -  27 Jul 2021 06:17  --------------------------------------------------------  IN: 4450.4 mL / OUT: 840 mL / NET: 3610.4 mL    ============================ LABS =========================                        9.0    15.62 )-----------( 176      ( 27 Jul 2021 04:13 )             28.0   07-27    129<L>  |  100  |  28<H>  ----------------------------<  251<H>  4.6   |  20<L>  |  1.06    Ca    8.5      27 Jul 2021 00:34  Phos  2.1     07-27  Mg     2.2     07-27    TPro  6.7  /  Alb  3.4  /  TBili  1.1  /  DBili  x   /  AST  68<H>  /  ALT  48<H>  /  AlkPhos  171<H>  07-27    PT/INR - ( 26 Jul 2021 11:11 )   PT: 32.4 sec;   INR: 2.84 ratio    PTT - ( 26 Jul 2021 11:11 )  PTT:35.5 sec    ABG - ( 27 Jul 2021 06:13 )  pH, Arterial: 7.39  pH, Blood: x     /  pCO2: 37    /  pO2: 158   / HCO3: 22    / Base Excess: -2.4  /  SaO2: 99        ======================================================  PAST MEDICAL & SURGICAL HISTORY:  CHF (congestive heart failure)    CAD (coronary artery disease)    Depression    Pleural effusion    History of 2019 novel coronavirus disease (COVID-19)  april 2020    Hemorrhoids    Bleeding hemorrhoids    Peripheral arterial disease    Claudication    BPH with urinary obstruction    ACC/AHA stage D systolic heart failure    Anticoagulation goal of INR 2.0 to 2.5    Falls    Clavicle fracture    CKD (chronic kidney disease), stage III    Iron deficiency anemia    H/O epistaxis    Vertigo    GI bleed    S/P TVR (tricuspid valve repair)    S/P ventricular assist device    S/P endoscopy    ========================ASSESSMENT ================  Stage D Nonischemic Cardiomyopathy, Status Post HM2 on 9/2017    Cardiogenic shock  Hemodynamic instability   Acute hypoxemic respiratory failure  GI bleed , Status Post Enteroscopy 6/14  Anemia, in setting of melena   Chronic Kidney Disease  Stress hyperglycemia   C.diff positive on 6/20   Hypovolemic shock  Hyponatremia   Leukocytosis    Plan:  ====================== NEUROLOGY=====================  Sedated now with Diprivan and weaned off of Precedex due to increasing respiratory distress and patient ventilator asynchrony.    ==================== RESPIRATORY======================  Acute hypoxemic resp failure s/p #8 shiley trach on on 6/25; Hypoxic this 7/26 and patient placed on SIMV for respiratory support, now place on PRVC for improved synchrony and support.  Continue close monitoring of respiratory rate and breathing pattern, following of ABG’s with A-line monitoring, and continuous pulse oximetry monitoring.   R PTC for mod R PTX    Mode: AC/ CMV (Assist Control/ Continuous Mandatory Ventilation)  RR (machine): 16  TV (machine): 500  FiO2: 50  PEEP: 5  ITime: 1  MAP: 14  PIP: 24    ====================CARDIOVASCULAR==================  Stage D Nonischemic Cardiomyopathy, Status Post HM2 on 9/2017; LVAD settings 9200 with flow of 3.4  TTE 6/29: EF 25-30%, mild LV enlargement, severe global LV dysfunction, normal RV size with decreased RV function, repeat echo last evening shows ongoing decreased biventricular function.  In addition, patient has had two blood cultures positive for Serratia, follow up cultures pending. Acute deterioration in part due to gram negative sepsis.  Continue invasive hemodynamic monitoring   Lopressor and Hydralazine dc'd 7/26    Required pressor support with IV Levophed and IV Vasopressin  IV Dobutamine started due to increased vasopressor requirement and Severe RV dysfunction on echo.   Rate control with amiodarone   TTE performed 6/26 reveals at least moderate MR, mild AI, severe global LV syst dysfxn, dec RV fxn.    aMIOdarone    Tablet 200 milliGRAM(s) Oral daily  norepinephrine Infusion 0.05 MICROgram(s)/kG/Min (7.36 mL/Hr) IV Continuous <Continuous>  vasopressin Infusion 0.017 Unit(s)/Min (1 mL/Hr) IV Continuous <Continuous>    ===================HEMATOLOGIC/ONC ===================  anemia due to acute blood loss associated with upper GI bleed s/p 1prbc and 1 FFP 6/26  Continue to monitor hemoglobin and hematocrit levels.     ===================== RENAL =========================  Continue monitoring I&Os, BUN/Cr, and urine output. Volume resuscitation ordered for oliguria due to sepsis/hypovlemic shock.    Replete lytes PRN. Keep K> 4 and Mg >2     ==================== GASTROINTESTINAL===================  GI bleed in setting of recent melena, GI following  Enteral feeding on hold  Continue IV Protonix drip for GI bleed     pantoprazole Infusion 8 mG/Hr (10 mL/Hr) IV Continuous <Continuous>  simethicone 80 milliGRAM(s) Chew every 8 hours PRN Dyspepsia    =======================    ENDOCRINE  =====================  Stress hyperglycemia required initiation of an IV insulin infusion with hourly glucose checks and titration as per insulin drip protocol.    ========================INFECTIOUS DISEASE================  Tmax 105.8F, WBC up trending 20.12->22.14  Monitor temperature and trend WBC.   6/20: C.Diff positive, c/w PO vancomycin,   Empiric coverage with cefepime and vancomycin, will f/u with ID    cefepime IVPB 1000 milliGRAM(s) IV Intermittent every 8 hours  vancomycin    Solution 125 milliGRAM(s) Oral daily  vancomycin  IVPB 1000 milliGRAM(s) IV Intermittent every 12 hours    Patient requires continuous monitoring with bedside rhythm monitoring, pulse oximetry monitoring, and continuous central venous and arterial pressure monitoring; and intermittent blood gas analysis.  Care plan discussed with ICU care team.    Patient remained critical, at risk for life threatening decompensation.   I have spent 55 minutes providing acute care with multiple re-evaluations overnight and during the early morning hours.    RICKY HAMPTON  MRN-[id number]  Patient is a 66y old  Male who presents with a chief complaint of Anemia, Supratherapeutic INR, Dark Stools (26 Jul 2021 18:47)    HPI:  65M PMH ACC/AHA stage D HF due to NICM HM2 LVAD , TV annuloplasty ring 9/12/17 as destination therapy due to severe peripheral artery disease with significant stenosis  SIADH, Depression, CKD-3 with hyperkalemia, past E. coli UTIs, driveline drainage (1/7/21) and COVID-19 (back in April 2020)  He was recently seen in clinic where he complained of abdominal pain and dark stools w constipation back in May. He presents to [facility] ER today weakness and fatigue, moderate and + Black stools for three days, on coumadin secondary to warfarin use in the setting of an LVAD. Patient has required transfusions for GIB in the past. Mostly recently back in jan 2021 pt had anemia with dark stools. No interventions was done at that time. However Last Endoscopy was done in July 2020 (negative). Today labs show patient is anemic with H/H of 4.5/16.3,. INR is 8.84 MAP in the 90s, Temp 35.1. He denies any chest pain, shortness of breath, dizziness, abd pain, nausea or vomiting.     (14 Jun 2021 16:57)    Surgery/Hospital course:  6/14 admit for melena w/ anemia, INR 8.84   6/15 Capsul study (+) for small bowel bleed, balloon endoscopy (old blood in prox ileum); post EGD - septic w/ L opacity, re-intubated for concern for aspiration, TTE (Mod MR, decrease biV w/ interventricular septum boweing towards R)  6/17 bronch   6/20 +C Diff   6/22 TC   6/22 CT C/A/P: Fluid filled colon which may be 2/2 rapid transit. Small bilateral pleffs with associates. Compressive atelectasis New ISABELLE & LLL  parenchymal opacities, suspicious for pneumonia. 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   6/25 #8 Shiley trach at bedside   6/28 CPAP trials   7/1 LVAD speed increased to 9200  7/2 Bronch; Central line dc'ed  7/20 TC since 7/7, continue as tolerated. Patient transferred to SDU.   7/21 Cont PT, no trach downsize today(#8cuffed)/ Anticoagulation/ Continue TF, speech f/u/ Vanco taper  7/22  oob to chair  INR  2.47  5 mg coumadin     increased lop to 25 q8  per HF  7/23 INR today 2.64.  H&H 7.3/24 this AM.  Will repeat CBC at noon, and will send stool guaiac Patient with persistent abdominal tenderness, rate of tube feeds decreased.  No nausea/vomiting.    7/24 INR today 2.4H&H 9.1/28.6 low flow overnight /N&V  NPO resolved for capsule study mondayn Coumadin on hold refusing Tube feeds on D5 1/2 normal  @50 cc/hr  7/25 INR 2.69  H&H 7.7/25.1 refusing Tube feeds on D5 1/2 normal  @50 cc/hr. This am + BM Anusha Oneill HF  aware- PRBC x1  GI team consulted -  NPO  plan on study in am-  D/w Dr Cadet Patient  to return to CTU for further management.  7/26 returned to full ventilator support, increasing vasopressor requirement, Lopressor and Hydralazine dc'd , blood cultures positive for gram negative rods. Mod R PTX --> R PTC placed. Empiric Vanco + Cefepime started for fevers and positive blood cultures. TTE reveals at least moderate MR, mild AI, severe global LV syst dysfxn, dec RV fxn.    ICU Vital Signs Last 24 Hrs  T(C): 39.6 (27 Jul 2021 04:00), Max: 41 (27 Jul 2021 00:00)  T(F): 103.3 (27 Jul 2021 04:00), Max: 105.8 (27 Jul 2021 00:00)  HR: 110 (27 Jul 2021 05:45) (87 - 125)  BP: 94/59 (26 Jul 2021 08:00) (94/59 - 94/59)  BP(mean): 67 (26 Jul 2021 08:00) (67 - 67)  ABP: 101/76 (27 Jul 2021 05:45) (64/59 - 121/68)  ABP(mean): 85 (27 Jul 2021 05:45) (56 - 94)  RR: 32 (27 Jul 2021 05:45) (18 - 83)  SpO2: 96% (27 Jul 2021 04:15) (59% - 100%)    ============================I/O===========================  I&O's Summary    25 Jul 2021 07:01  -  26 Jul 2021 07:00  --------------------------------------------------------  IN: 2323 mL / OUT: 950 mL / NET: 1373 mL    26 Jul 2021 07:01  -  27 Jul 2021 06:17  --------------------------------------------------------  IN: 4450.4 mL / OUT: 840 mL / NET: 3610.4 mL    ============================ LABS =========================                        9.0    15.62 )-----------( 176      ( 27 Jul 2021 04:13 )             28.0   07-27    129<L>  |  100  |  28<H>  ----------------------------<  251<H>  4.6   |  20<L>  |  1.06    Ca    8.5      27 Jul 2021 00:34  Phos  2.1     07-27  Mg     2.2     07-27    TPro  6.7  /  Alb  3.4  /  TBili  1.1  /  DBili  x   /  AST  68<H>  /  ALT  48<H>  /  AlkPhos  171<H>  07-27    PT/INR - ( 26 Jul 2021 11:11 )   PT: 32.4 sec;   INR: 2.84 ratio    PTT - ( 26 Jul 2021 11:11 )  PTT:35.5 sec    ABG - ( 27 Jul 2021 06:13 )  pH, Arterial: 7.39  pH, Blood: x     /  pCO2: 37    /  pO2: 158   / HCO3: 22    / Base Excess: -2.4  /  SaO2: 99        ======================================================  PAST MEDICAL & SURGICAL HISTORY:  CHF (congestive heart failure)    CAD (coronary artery disease)    Depression    Pleural effusion    History of 2019 novel coronavirus disease (COVID-19)  april 2020    Hemorrhoids    Bleeding hemorrhoids    Peripheral arterial disease    Claudication    BPH with urinary obstruction    ACC/AHA stage D systolic heart failure    Anticoagulation goal of INR 2.0 to 2.5    Falls    Clavicle fracture    CKD (chronic kidney disease), stage III    Iron deficiency anemia    H/O epistaxis    Vertigo    GI bleed    S/P TVR (tricuspid valve repair)    S/P ventricular assist device    S/P endoscopy    ========================ASSESSMENT ================  Stage D Nonischemic Cardiomyopathy, Status Post HM2 on 9/2017    Cardiogenic shock  Hemodynamic instability   Acute hypoxemic respiratory failure  GI bleed , Status Post Enteroscopy 6/14  Anemia, in setting of melena   Chronic Kidney Disease  Stress hyperglycemia   C.diff positive on 6/20   Hypovolemic shock  Hyponatremia   Leukocytosis    Plan:  ====================== NEUROLOGY=====================  Sedated now with Diprivan and weaned off of Precedex due to increasing respiratory distress and patient ventilator asynchrony.    ==================== RESPIRATORY======================  Acute hypoxemic resp failure s/p #8 shiley trach on on 6/25; Hypoxic this 7/26 and patient placed on SIMV for respiratory support, now place on PRVC for improved synchrony and support.  Continue close monitoring of respiratory rate and breathing pattern, following of ABG’s with A-line monitoring, and continuous pulse oximetry monitoring.   R PTC for mod R PTX    Mode: AC/ CMV (Assist Control/ Continuous Mandatory Ventilation)  RR (machine): 16  TV (machine): 500  FiO2: 50  PEEP: 5  ITime: 1  MAP: 14  PIP: 24    ====================CARDIOVASCULAR==================  Stage D Nonischemic Cardiomyopathy, Status Post HM2 on 9/2017; LVAD settings 9200 with flow of 3.4  TTE 6/29: EF 25-30%, mild LV enlargement, severe global LV dysfunction, normal RV size with decreased RV function, repeat echo last evening shows ongoing decreased biventricular function.  In addition, patient has had two blood cultures positive for Serratia, follow up cultures pending. Acute deterioration in part due to gram negative sepsis.  Continue invasive hemodynamic monitoring   Lopressor and Hydralazine dc'd 7/26    Required pressor support with IV Levophed and IV Vasopressin  IV Dobutamine started due to increased vasopressor requirement and Severe RV dysfunction on echo.   Rate control with amiodarone   TTE performed 6/26 reveals at least moderate MR, mild AI, severe global LV syst dysfxn, dec RV fxn.    aMIOdarone    Tablet 200 milliGRAM(s) Oral daily  norepinephrine Infusion 0.05 MICROgram(s)/kG/Min (7.36 mL/Hr) IV Continuous <Continuous>  vasopressin Infusion 0.017 Unit(s)/Min (1 mL/Hr) IV Continuous <Continuous>    ===================HEMATOLOGIC/ONC ===================  anemia due to acute blood loss associated with upper GI bleed s/p 1prbc and 1 FFP 6/26  Continue to monitor hemoglobin and hematocrit levels.     ===================== RENAL =========================  Continue monitoring I&Os, BUN/Cr, and urine output. Volume resuscitation ordered for oliguria due to sepsis/hypovlemic shock.    Replete lytes PRN. Keep K> 4 and Mg >2     ==================== GASTROINTESTINAL===================  GI bleed in setting of recent melena, GI following  Enteral feeding on hold  Continue IV Protonix drip for GI bleed     pantoprazole Infusion 8 mG/Hr (10 mL/Hr) IV Continuous <Continuous>  simethicone 80 milliGRAM(s) Chew every 8 hours PRN Dyspepsia    =======================    ENDOCRINE  =====================  Stress hyperglycemia required initiation of an IV insulin infusion with hourly glucose checks and titration as per insulin drip protocol.    ========================INFECTIOUS DISEASE================  Tmax 105.8F, WBC up trending 20.12->22.14  Monitor temperature and trend WBC.   6/20: C.Diff positive, c/w PO vancomycin, blood cultures positive for Serratia marcescens  Empiric coverage with cefepime and vancomycin started yesterday, will f/u with ID, may consider discontinuing IV Vancomycin    cefepime IVPB 1000 milliGRAM(s) IV Intermittent every 8 hours  vancomycin    Solution 125 milliGRAM(s) Oral daily  vancomycin  IVPB 1000 milliGRAM(s) IV Intermittent every 12 hours    Patient requires continuous monitoring with bedside rhythm monitoring, pulse oximetry monitoring, and continuous central venous and arterial pressure monitoring; and intermittent blood gas analysis.  Care plan discussed with ICU care team.    Patient remained critical, at risk for life threatening decompensation.   I have spent 55 minutes providing acute care with multiple re-evaluations overnight and during the early morning hours.

## 2021-07-27 NOTE — PROGRESS NOTE ADULT - SUBJECTIVE AND OBJECTIVE BOX
RICKY HAMPTON  MRN-[id number]  Patient is a 66y old  Male who presents with a chief complaint of Anemia, Supratherapeutic INR, Dark Stools (2021 18:26)    HPI:  65M PMH ACC/AHA stage D HF due to NICM HM2 LVAD , TV annuloplasty ring 17 as destination therapy due to severe peripheral artery disease with significant stenosis  SIADH, Depression, CKD-3 with hyperkalemia, past E. coli UTIs, driveline drainage (21) and COVID-19 (back in 2020)  He was recently seen in clinic where he complained of abdominal pain and dark stools w constipation back in May. He presents to [facility] ER today weakness and fatigue, moderate and + Black stools for three days, on coumadin secondary to warfarin use in the setting of an LVAD. Patient has required transfusions for GIB in the past. Mostly recently back in 2021 pt had anemia with dark stools. No interventions was done at that time. However Last Endoscopy was done in 2020 (negative). Today labs show patient is anemic with H/H of 4.5/16.3,. INR is 8.84 MAP in the 90s, Temp 35.1. He denies any chest pain, shortness of breath, dizziness, abd pain, nausea or vomiting.       (2021 16:57)      Surgery/Hospital course:   admit for melena w/ anemia, INR 8.84   6/15 Capsul study (+) for small bowel bleed, balloon endoscopy (old blood in prox ileum); post EGD - septic w/ L opacity, re-intubated for concern for aspiration, TTE (Mod MR, decrease biV w/ interventricular septum boweing towards R)   bronch    +C Diff    TC    CT C/A/P: Fluid filled colon which may be 2/2 rapid transit. Small bilateral pleffs with associates. Compressive atelectasis New ISABELLE & LLL  parenchymal opacities, suspicious for pneumonia. 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    #8 Shiley trach at bedside    CPAP trials    LVAD speed increased to 9200   Bronch; Central line dc'ed   TC since , continue as tolerated. Patient transferred to SDU.    Cont PT, no trach downsize today(#8cuffed)/ Anticoagulation/ Continue TF, speech f/u/ Vanco taper    oob to chair  INR  2.47  5 mg coumadin     increased lop to 25 q8  per HF   INR today 2.64.  H&H 7.3 this AM.  Will repeat CBC at noon, and will send stool guaiac Patient with persistent abdominal tenderness, rate of tube feeds decreased.  No nausea/vomiting.     INR today 2.4H&H 9.1/28.6 low flow overnight /N&V  NPO resolved for capsule study  Coumadin on hold refusing Tube feeds on D5 / normal  @50 cc/hr   INR 2.69  H&H 7..1 refusing Tube feeds on D5 1/2 normal  @50 cc/hr. This am + BM Anusha Oneill HF  aware- PRBC x1  GI team consulted -  NPO  plan on study in am-  D/w Dr Cadet Patient  to return to CTU for further management; 1PRBCS    Post op INR 2.2 today.  No bleeding. BC + for SM.  Pt is hypotensive requiring pressor and inotropic support.  ID follow up today on Cefepime will follow.   R PTC for PTX     Today/Overnight:  BCx+  +Serratia marcescens, repeat cultures sent this AM, will f/u, cefepime switched to meropenem for coverage. ?RUQ US, CTAP w/o contrast for further eval to source of sepsis?    Vital Signs Last 24 Hrs  T(C): 36.2 (2021 20:00), Max: 41 (2021 00:00)  T(F): 97.2 (2021 20:00), Max: 105.8 (2021 00:00)  HR: 102 (2021 22:00) (0 - 122)  BP: --  BP(mean): --  RR: 54 (2021 22:00) (17 - 68)  SpO2: 100% (2021 21:30) (59% - 100%)  ============================I/O===========================  I&O's Detail    2021 07:  -  2021 07:00  --------------------------------------------------------  IN:    Albumin 5%  - 250 mL: 1050 mL    dextrose 5% + sodium chloride 0.45%: 750 mL    DOBUTamine: 35.4 mL    Enteral Tube Flush: 60 mL    Insulin: 9 mL    IV PiggyBack: 500 mL    IV PiggyBack: 250 mL    multiple electrolytes Injection Type 1 Bolus: 500 mL    Norepinephrine: 245.4 mL    Pantoprazole: 240 mL    Phenylephrine: 260.1 mL    PRBCs (Packed Red Blood Cells): 300 mL    Propofol: 183.9 mL    sodium chloride 0.45%: 100 mL    sodium chloride 0.9%: 300 mL    Vasopressin: 75 mL  Total IN: 4858.8 mL    OUT:    Chest Tube (mL): 105 mL    Dexmedetomidine: 0 mL    Indwelling Catheter - Urethral (mL): 325 mL    Miscellaneous Tube Feedin mL    Voided (mL): 500 mL  Total OUT: 930 mL    Total NET: 3928.8 mL      2021 07:01  -  2021 22:26  --------------------------------------------------------  IN:    Albumin 5%  - 250 mL: 500 mL    DOBUTamine: 23.6 mL    DOBUTamine: 70.8 mL    Insulin: 28 mL    Miscellaneous Tube Feedin mL    Norepinephrine: 106.2 mL    Pantoprazole: 150 mL    Propofol: 289 mL    sodium chloride 0.9%: 750 mL    Vasopressin: 87 mL  Total IN: 2334.6 mL    OUT:    Chest Tube (mL): 90 mL    Dexmedetomidine: 0 mL    Indwelling Catheter - Urethral (mL): 450 mL  Total OUT: 540 mL    Total NET: 1794.6 mL        ============================ LABS =========================                        9.0    15.62 )-----------( 176      ( 2021 04:13 )             28.0     -    129<L>  |  100  |  28<H>  ----------------------------<  251<H>  4.6   |  20<L>  |  1.06    Ca    8.5      2021 00:34  Phos  2.1       Mg     2.2         TPro  6.7  /  Alb  3.4  /  TBili  1.1  /  DBili  x   /  AST  68<H>  /  ALT  48<H>  /  AlkPhos  171<H>      LIVER FUNCTIONS - ( 2021 00:34 )  Alb: 3.4 g/dL / Pro: 6.7 g/dL / ALK PHOS: 171 U/L / ALT: 48 U/L / AST: 68 U/L / GGT: x           PT/INR - ( 2021 14:23 )   PT: 25.8 sec;   INR: 2.23 ratio         PTT - ( 2021 11:11 )  PTT:35.5 sec  ABG - ( 2021 19:51 )  pH, Arterial: 7.33  pH, Blood: x     /  pCO2: 43    /  pO2: 190   / HCO3: 22    / Base Excess: -3.2  /  SaO2: 100               Urinalysis Basic - ( 2021 08:44 )    Color: Yellow / Appearance: Clear / S.034 / pH: x  Gluc: x / Ketone: Moderate  / Bili: Small / Urobili: Negative   Blood: x / Protein: 30 mg/dL / Nitrite: Negative   Leuk Esterase: Negative / RBC: 1 /hpf / WBC 2 /HPF   Sq Epi: x / Non Sq Epi: 0 /hpf / Bacteria: Negative      ======================Micro/Rad/Cardio=================  Culture: Reviewed   CXR: Reviewed  Echo: Reviewed  ======================================================  PAST MEDICAL & SURGICAL HISTORY:  CHF (congestive heart failure)    CAD (coronary artery disease)    Depression    Pleural effusion    History of  novel coronavirus disease (COVID-19)  2020    Hemorrhoids    Bleeding hemorrhoids    Peripheral arterial disease    Claudication    BPH with urinary obstruction    ACC/AHA stage D systolic heart failure    Anticoagulation goal of INR 2.0 to 2.5    Falls    Clavicle fracture    CKD (chronic kidney disease), stage III    Iron deficiency anemia    H/O epistaxis    Vertigo    GI bleed    S/P TVR (tricuspid valve repair)    S/P ventricular assist device    S/P endoscopy      ========================ASSESSMENT ================  Stage D Nonischemic Cardiomyopathy, Status Post HM2 on 2017    Cardiogenic shock  Hemodynamic instability   Acute hypoxemic respiratory failure  GI bleed , Status Post Enteroscopy   Anemia, in setting of melena   Chronic Kidney Disease  Stress hyperglycemia   C.diff positive on    Hypovolemic shock  Hyponatremia   Leukocytosis    Plan:  ====================== NEUROLOGY=====================  Sedated with IV Precedex and IV Propofol for intermittent agitation   Tylenol PRN and Dilaudid for analgesia   C/w clonazepam for agitation    acetaminophen    Suspension .. 650 milliGRAM(s) Oral every 6 hours PRN Mild Pain (1 - 3)  clonazePAM  Tablet 0.5 milliGRAM(s) Oral at bedtime  dexMEDEtomidine Infusion 1.5 MICROgram(s)/kG/Hr (29.4 mL/Hr) IV Continuous <Continuous>  HYDROmorphone  Injectable 0.5 milliGRAM(s) IV Push once  propofol Infusion 25 MICROgram(s)/kG/Min (11.8 mL/Hr) IV Continuous <Continuous>    ==================== RESPIRATORY======================  Acute hypoxemic resp failure s/p #8 shiley trach on on    Requiring full vent support, continue close monitoring of respiratory rate and breathing pattern, following of ABG’s with A-line monitoring, continuous pulse oximetry monitoring.   R PTC placed on  for mod R PTX, continue to monitor output.     Mechanical Ventilation:  Mode: AC/ CMV (Assist Control/ Continuous Mandatory Ventilation)  RR (machine): 16  TV (machine): 500  FiO2: 50  PEEP: 5  ITime: 1  MAP: 11  PIP: 24      ====================CARDIOVASCULAR==================  Stage D Nonischemic Cardiomyopathy, Status Post HM2 on 2017; LVAD settings 9200 with flow of 4.6   TTE : reveals at least moderate MR, mild AI, severe global LV syst dysfxn, dec RV fxn.  Continue invasive hemodynamic monitoring   Inotropic support with IV Dobutamine, maintain MAPs 70s-80s.   Maintain pressor support with IV Vasopressin, on and off IV Levophed   Rate control with amiodarone     aMIOdarone    Tablet 200 milliGRAM(s) Oral daily  DOBUTamine Infusion 2.5 MICROgram(s)/kG/Min (5.89 mL/Hr) IV Continuous <Continuous>  norepinephrine Infusion 0.05 MICROgram(s)/kG/Min (7.36 mL/Hr) IV Continuous <Continuous>  vasopressin Infusion 0.017 Unit(s)/Min (1 mL/Hr) IV Continuous <Continuous>    ===================HEMATOLOGIC/ONC ===================  Anemia due to acute blood loss associated with upper GI bleed; holding Coumadin   Continue to monitor H&H/Plts     ===================== RENAL =========================  Continue monitoring I&Os, BUN/Cr, and urine output   Replete lytes PRN. Keep K> 4 and Mg >2     ==================== GASTROINTESTINAL===================  GI bleed in setting of recent melena, GI following - continue Protonix drip   C.diff + on , continue vanco   Enteral feeding held   Simethicone PRN dyspepsia     pantoprazole Infusion 8 mG/Hr (10 mL/Hr) IV Continuous <Continuous>  simethicone 80 milliGRAM(s) Chew every 8 hours PRN Dyspepsia  sodium chloride 0.9%. 1000 milliLiter(s) (50 mL/Hr) IV Continuous <Continuous>    =======================    ENDOCRINE  =====================  Stress hyperglycemia, continue glucose control with IV insulin drip     dextrose 50% Injectable 50 milliLiter(s) IV Push every 15 minutes  insulin regular Infusion 3 Unit(s)/Hr (3 mL/Hr) IV Continuous <Continuous>    ========================INFECTIOUS DISEASE================  Febrile overnight, Temp now 100.9F, WBC downtrending 22.14->15.62   Monitor temperature and trend WBC   BCx+  +Serratia marcescens. repeat cultures sent this AM, will f/u   Cefepime switched to meropenem this AM   ?RUQ US, CTAP w/o contrast for further eval to source of sepsis?  C.diff + on , continue vanco     meropenem  IVPB 1000 milliGRAM(s) IV Intermittent every 8 hours  vancomycin    Solution 125 milliGRAM(s) Oral daily      Patient requires continuous monitoring with bedside rhythm monitoring, pulse oximetry monitoring, and continuous central venous and arterial pressure monitoring; and intermittent blood gas analysis.  Care plan discussed with ICU care team.    Patient remained critical, at risk for life threatening decompensation.   I have spent 35 minutes providing acute care with multiple re-evaluations throughout the evening.     By signing my name below, I, Agnieszka Cherry, attest that this documentation has been prepared under the direction and in the presence of CLAUDIA Lee .  Electronically signed: Cesia Shaffer, Georgina Perez, personally performed the services described in this documentation. All medical record entries made by the scribe were at my direction and in my presence. I have reviewed the chart and agree that the record reflects my personal performance and is accurate and complete  Electronically signed: CLAUDIA Lee 21 @ 22:26       RICKY HAMPTON  MRN-[id number]  Patient is a 66y old  Male who presents with a chief complaint of Anemia, Supratherapeutic INR, Dark Stools (2021 18:26)    HPI:  65M PMH ACC/AHA stage D HF due to NICM HM2 LVAD , TV annuloplasty ring 17 as destination therapy due to severe peripheral artery disease with significant stenosis  SIADH, Depression, CKD-3 with hyperkalemia, past E. coli UTIs, driveline drainage (21) and COVID-19 (back in 2020)  He was recently seen in clinic where he complained of abdominal pain and dark stools w constipation back in May. He presents to [facility] ER today weakness and fatigue, moderate and + Black stools for three days, on coumadin secondary to warfarin use in the setting of an LVAD. Patient has required transfusions for GIB in the past. Mostly recently back in 2021 pt had anemia with dark stools. No interventions was done at that time. However Last Endoscopy was done in 2020 (negative). Today labs show patient is anemic with H/H of 4.5/16.3,. INR is 8.84 MAP in the 90s, Temp 35.1. He denies any chest pain, shortness of breath, dizziness, abd pain, nausea or vomiting.       (2021 16:57)      Surgery/Hospital course:   admit for melena w/ anemia, INR 8.84   6/15 Capsul study (+) for small bowel bleed, balloon endoscopy (old blood in prox ileum); post EGD - septic w/ L opacity, re-intubated for concern for aspiration, TTE (Mod MR, decrease biV w/ interventricular septum boweing towards R)   bronch    +C Diff    TC    CT C/A/P: Fluid filled colon which may be 2/2 rapid transit. Small bilateral pleffs with associates. Compressive atelectasis New ISABELLE & LLL  parenchymal opacities, suspicious for pneumonia. 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    #8 Shiley trach at bedside    CPAP trials    LVAD speed increased to 9200   Bronch; Central line dc'ed   TC since , continue as tolerated. Patient transferred to SDU.    Cont PT, no trach downsize today(#8cuffed)/ Anticoagulation/ Continue TF, speech f/u/ Vanco taper    oob to chair  INR  2.47  5 mg coumadin     increased lop to 25 q8  per HF   INR today 2.64.  H&H 7.3 this AM.  Will repeat CBC at noon, and will send stool guaiac Patient with persistent abdominal tenderness, rate of tube feeds decreased.  No nausea/vomiting.     INR today 2.4H&H 9.1/28.6 low flow overnight /N&V  NPO resolved for capsule study  Coumadin on hold refusing Tube feeds on D5 / normal  @50 cc/hr   INR 2.69  H&H 7..1 refusing Tube feeds on D5 1/2 normal  @50 cc/hr. This am + BM Anusha Oneill HF  aware- PRBC x1  GI team consulted -  NPO  plan on study in am-  D/w Dr Cadet Patient  to return to CTU for further management; 1PRBCS    Post op INR 2.2 today.  No bleeding. BC + for SM.  Pt is hypotensive requiring pressor and inotropic support.  ID follow up today on Cefepime will follow.   R PTC for PTX     Today/Overnight:  BCx+  +Serratia marcescens, repeat cultures sent this AM, will f/u, cefepime switched to meropenem for coverage. ?RUQ US, CTAP w/o contrast for further eval to source of sepsis?    Vital Signs Last 24 Hrs  T(C): 36.2 (2021 20:00), Max: 41 (2021 00:00)  T(F): 97.2 (2021 20:00), Max: 105.8 (2021 00:00)  HR: 102 (2021 22:00) (0 - 122)  BP: --  BP(mean): --  RR: 54 (2021 22:00) (17 - 68)  SpO2: 100% (2021 21:30) (59% - 100%)  ============================I/O===========================  I&O's Detail    2021 07:  -  2021 07:00  --------------------------------------------------------  IN:    Albumin 5%  - 250 mL: 1050 mL    dextrose 5% + sodium chloride 0.45%: 750 mL    DOBUTamine: 35.4 mL    Enteral Tube Flush: 60 mL    Insulin: 9 mL    IV PiggyBack: 500 mL    IV PiggyBack: 250 mL    multiple electrolytes Injection Type 1 Bolus: 500 mL    Norepinephrine: 245.4 mL    Pantoprazole: 240 mL    Phenylephrine: 260.1 mL    PRBCs (Packed Red Blood Cells): 300 mL    Propofol: 183.9 mL    sodium chloride 0.45%: 100 mL    sodium chloride 0.9%: 300 mL    Vasopressin: 75 mL  Total IN: 4858.8 mL    OUT:    Chest Tube (mL): 105 mL    Dexmedetomidine: 0 mL    Indwelling Catheter - Urethral (mL): 325 mL    Miscellaneous Tube Feedin mL    Voided (mL): 500 mL  Total OUT: 930 mL    Total NET: 3928.8 mL      2021 07:01  -  2021 22:26  --------------------------------------------------------  IN:    Albumin 5%  - 250 mL: 500 mL    DOBUTamine: 23.6 mL    DOBUTamine: 70.8 mL    Insulin: 28 mL    Miscellaneous Tube Feedin mL    Norepinephrine: 106.2 mL    Pantoprazole: 150 mL    Propofol: 289 mL    sodium chloride 0.9%: 750 mL    Vasopressin: 87 mL  Total IN: 2334.6 mL    OUT:    Chest Tube (mL): 90 mL    Dexmedetomidine: 0 mL    Indwelling Catheter - Urethral (mL): 450 mL  Total OUT: 540 mL    Total NET: 1794.6 mL        ============================ LABS =========================                        9.0    15.62 )-----------( 176      ( 2021 04:13 )             28.0     -    129<L>  |  100  |  28<H>  ----------------------------<  251<H>  4.6   |  20<L>  |  1.06    Ca    8.5      2021 00:34  Phos  2.1       Mg     2.2         TPro  6.7  /  Alb  3.4  /  TBili  1.1  /  DBili  x   /  AST  68<H>  /  ALT  48<H>  /  AlkPhos  171<H>      LIVER FUNCTIONS - ( 2021 00:34 )  Alb: 3.4 g/dL / Pro: 6.7 g/dL / ALK PHOS: 171 U/L / ALT: 48 U/L / AST: 68 U/L / GGT: x           PT/INR - ( 2021 14:23 )   PT: 25.8 sec;   INR: 2.23 ratio         PTT - ( 2021 11:11 )  PTT:35.5 sec  ABG - ( 2021 19:51 )  pH, Arterial: 7.33  pH, Blood: x     /  pCO2: 43    /  pO2: 190   / HCO3: 22    / Base Excess: -3.2  /  SaO2: 100               Urinalysis Basic - ( 2021 08:44 )    Color: Yellow / Appearance: Clear / S.034 / pH: x  Gluc: x / Ketone: Moderate  / Bili: Small / Urobili: Negative   Blood: x / Protein: 30 mg/dL / Nitrite: Negative   Leuk Esterase: Negative / RBC: 1 /hpf / WBC 2 /HPF   Sq Epi: x / Non Sq Epi: 0 /hpf / Bacteria: Negative      ======================Micro/Rad/Cardio=================  Culture: Reviewed   CXR: Reviewed  Echo: Reviewed  ======================================================  PAST MEDICAL & SURGICAL HISTORY:  CHF (congestive heart failure)    CAD (coronary artery disease)    Depression    Pleural effusion    History of  novel coronavirus disease (COVID-19)  2020    Hemorrhoids    Bleeding hemorrhoids    Peripheral arterial disease    Claudication    BPH with urinary obstruction    ACC/AHA stage D systolic heart failure    Anticoagulation goal of INR 2.0 to 2.5    Falls    Clavicle fracture    CKD (chronic kidney disease), stage III    Iron deficiency anemia    H/O epistaxis    Vertigo    GI bleed    S/P TVR (tricuspid valve repair)    S/P ventricular assist device    S/P endoscopy      ========================ASSESSMENT ================  Stage D Nonischemic Cardiomyopathy, Status Post HM2 on 2017    Cardiogenic shock  Hemodynamic instability   Acute hypoxemic respiratory failure  GI bleed , Status Post Enteroscopy   Anemia, in setting of melena   Chronic Kidney Disease  Stress hyperglycemia   C.diff positive on    Hypovolemic shock  Hyponatremia   Leukocytosis  Bacteremia     Plan:  ====================== NEUROLOGY=====================  Sedated with IV Precedex and IV Propofol for intermittent agitation   Tylenol PRN and Dilaudid for analgesia   C/w clonazepam for agitation    acetaminophen    Suspension .. 650 milliGRAM(s) Oral every 6 hours PRN Mild Pain (1 - 3)  clonazePAM  Tablet 0.5 milliGRAM(s) Oral at bedtime  dexMEDEtomidine Infusion 1.5 MICROgram(s)/kG/Hr (29.4 mL/Hr) IV Continuous <Continuous>  HYDROmorphone  Injectable 0.5 milliGRAM(s) IV Push once  propofol Infusion 25 MICROgram(s)/kG/Min (11.8 mL/Hr) IV Continuous <Continuous>    ==================== RESPIRATORY======================  Acute hypoxemic resp failure s/p #8 shiley trach on on    Requiring full vent support, continue close monitoring of respiratory rate and breathing pattern, following of ABG’s with A-line monitoring, continuous pulse oximetry monitoring.   R PTC placed on  for mod R PTX, continue to monitor output.     Mechanical Ventilation:  Mode: AC/ CMV (Assist Control/ Continuous Mandatory Ventilation)  RR (machine): 16  TV (machine): 500  FiO2: 50  PEEP: 5  ITime: 1  MAP: 11  PIP: 24      ====================CARDIOVASCULAR==================  Stage D Nonischemic Cardiomyopathy, Status Post HM2 on 2017; LVAD settings 9200 with flow of 4.6   TTE : reveals at least moderate MR, mild AI, severe global LV syst dysfxn, dec RV fxn.  Continue invasive hemodynamic monitoring   Inotropic support with IV Dobutamine, maintain MAPs 70s-80s.   Maintain pressor support with IV Vasopressin, on and off IV Levophed   Rate control with amiodarone     aMIOdarone    Tablet 200 milliGRAM(s) Oral daily  DOBUTamine Infusion 2.5 MICROgram(s)/kG/Min (5.89 mL/Hr) IV Continuous <Continuous>  norepinephrine Infusion 0.05 MICROgram(s)/kG/Min (7.36 mL/Hr) IV Continuous <Continuous>  vasopressin Infusion 0.017 Unit(s)/Min (1 mL/Hr) IV Continuous <Continuous>    ===================HEMATOLOGIC/ONC ===================  Anemia due to acute blood loss associated with upper GI bleed; holding Coumadin   Continue to monitor H&H/Plts     ===================== RENAL =========================  Continue monitoring I&Os, BUN/Cr, and urine output   Replete lytes PRN. Keep K> 4 and Mg >2     ==================== GASTROINTESTINAL===================  GI bleed in setting of recent melena, GI following - continue Protonix drip   C.diff + on , continue vanco   Enteral feeding held   Simethicone PRN dyspepsia     pantoprazole Infusion 8 mG/Hr (10 mL/Hr) IV Continuous <Continuous>  simethicone 80 milliGRAM(s) Chew every 8 hours PRN Dyspepsia  sodium chloride 0.9%. 1000 milliLiter(s) (50 mL/Hr) IV Continuous <Continuous>    =======================    ENDOCRINE  =====================  Stress hyperglycemia, continue glucose control with IV insulin drip     dextrose 50% Injectable 50 milliLiter(s) IV Push every 15 minutes  insulin regular Infusion 3 Unit(s)/Hr (3 mL/Hr) IV Continuous <Continuous>    ========================INFECTIOUS DISEASE================  Febrile overnight, Temp now 100.9F, WBC downtrending 22.14->15.62   Monitor temperature and trend WBC   BCx+  +Serratia marcescens. repeat cultures sent this AM, will f/u   Cefepime switched to meropenem this AM   ?RUQ US, CTAP w/o contrast for further eval to source of sepsis?  C.diff + on , continue vanco     meropenem  IVPB 1000 milliGRAM(s) IV Intermittent every 8 hours  vancomycin    Solution 125 milliGRAM(s) Oral daily      Patient requires continuous monitoring with bedside rhythm monitoring, pulse oximetry monitoring, and continuous central venous and arterial pressure monitoring; and intermittent blood gas analysis.  Care plan discussed with ICU care team.    Patient remained critical, at risk for life threatening decompensation.   I have spent 35 minutes providing acute care with multiple re-evaluations throughout the evening.     By signing my name below, I, Agnieszka Cherry, attest that this documentation has been prepared under the direction and in the presence of CLAUDIA Lee .  Electronically signed: Cesia Shaffer, Georgina Perez, personally performed the services described in this documentation. All medical record entries made by the scribe were at my direction and in my presence. I have reviewed the chart and agree that the record reflects my personal performance and is accurate and complete  Electronically signed: CLAUDIA Lee 21 @ 22:26

## 2021-07-27 NOTE — PROGRESS NOTE ADULT - SUBJECTIVE AND OBJECTIVE BOX
Patient is a 66y old  Male who presents with a chief complaint of Anemia, Supratherapeutic INR, Dark Stools (2021 13:25)      Interval Events:   Febrile overnight.  On ventilator and multiple pressors.  Unable to provide ROS.    Hospital Medications:  acetaminophen    Suspension .. 650 milliGRAM(s) Oral every 6 hours PRN  aMIOdarone    Tablet 200 milliGRAM(s) Oral daily  chlorhexidine 0.12% Liquid 15 milliLiter(s) Oral Mucosa every 12 hours  chlorhexidine 2% Cloths 1 Application(s) Topical <User Schedule>  clonazePAM  Tablet 0.5 milliGRAM(s) Oral at bedtime  dexMEDEtomidine Infusion 1.5 MICROgram(s)/kG/Hr IV Continuous <Continuous>  dextrose 50% Injectable 50 milliLiter(s) IV Push every 15 minutes  DOBUTamine Infusion 2.5 MICROgram(s)/kG/Min IV Continuous <Continuous>  insulin regular Infusion 3 Unit(s)/Hr IV Continuous <Continuous>  meropenem  IVPB 1000 milliGRAM(s) IV Intermittent once  meropenem  IVPB 1000 milliGRAM(s) IV Intermittent every 8 hours  meropenem  IVPB      norepinephrine Infusion 0.05 MICROgram(s)/kG/Min IV Continuous <Continuous>  pantoprazole Infusion 8 mG/Hr IV Continuous <Continuous>  propofol Infusion 25 MICROgram(s)/kG/Min IV Continuous <Continuous>  simethicone 80 milliGRAM(s) Chew every 8 hours PRN  sodium chloride 0.9%. 1000 milliLiter(s) IV Continuous <Continuous>  vancomycin    Solution 125 milliGRAM(s) Oral daily  vasopressin Infusion 0.017 Unit(s)/Min IV Continuous <Continuous>    PHYSICAL EXAM:   Vital Signs:  Vital Signs Last 24 Hrs  T(C): 37.4 (2021 16:00), Max: 41 (2021 00:00)  T(F): 99.3 (2021 16:00), Max: 105.8 (2021 00:00)  HR: 95 (2021 18:00) (65 - 122)  BP: --  BP(mean): --  RR: 18 (2021 18:00) (17 - 69)  SpO2: 100% (2021 18:00) (59% - 100%)  Daily     Daily Weight in k.1 (2021 00:00)    GENERAL: confused  NEURO: not alert/oriented  HEENT: anicteric sclera, no conjunctival pallor appreciated  CHEST: on ventilator via trach, no accessory muscle use  CARDIAC: regular rate, +S1/S2  ABDOMEN: soft, nondistended, nontender, no rebound or guarding  EXTREMITIES: warm, well perfused, no edema  SKIN: no lesions noted    LABS: reviewed                        9.0    15.62 )-----------( 176      ( 2021 04:13 )             28.0         129<L>  |  100  |  28<H>  ----------------------------<  251<H>  4.6   |  20<L>  |  1.06    Ca    8.5      2021 00:34  Phos  2.1       Mg     2.2         TPro  6.7  /  Alb  3.4  /  TBili  1.1  /  DBili  x   /  AST  68<H>  /  ALT  48<H>  /  AlkPhos  171<H>      LIVER FUNCTIONS - ( 2021 00:34 )  Alb: 3.4 g/dL / Pro: 6.7 g/dL / ALK PHOS: 171 U/L / ALT: 48 U/L / AST: 68 U/L / GGT: x             Interval Diagnostic Studies: see sunrise for full report

## 2021-07-27 NOTE — PROGRESS NOTE ADULT - ATTENDING COMMENTS
Patient seen and examined. Case discussed with Dr Hall    I agree with his interval history exam and plans as noted above    Patient with Serrati bacteremia- high grade  sensitivity pending  Please send repeat blood cultures to document clearing of bacteremia  Antibiotics changed to Meropenem pending sensitivity testing  Would scan with US of abdomen  Once more stable could obtain CT of chest/abd/pelvis  send sputum from trach for culture    Morris Prater MD  [phone]  After 5pm/weekends [phone]

## 2021-07-27 NOTE — PROGRESS NOTE ADULT - ASSESSMENT
67 YO M with a history of stage D NICM s/p HM2 on 9/2017 as DT (due to severe PAD) with TV ring, prior COVID-19 infection 4/2020, recurrent syncope post LVAD s/p ILR, and chronic abdominal pain with prior negative workup who was admitted with symptomatic anemia with Hgb 4.5 in setting of INR 8.8 without hemodynamic instability. He was transfused and underwent VCE which showed active bleeding in the mid small bowel but subsequent enteroscopy 6/15 did not reveal any active bleeding. He acutely decompensated after procedure with fever/hypertension, low flow alarms, and pulmonary infiltrate with hypoxia requiring intubation from probable aspiration PNA. His LDH is normal and there are no signs of overt LVAD dysfunction on echo though signs of insufficiently unloaded ventricle for which his speed has been increased. Course notable for inability to wean ventilator with persistent secretions for which he underwent tracheostomy as well as acute c diff colitis. Worsening anemia over last few days requiring transfusion with low flow alarms, concerning for recurrent GI bleed. Now w/ hypotension and fevers c/f sepsis, Cxs pos for serratia.   67 YO M with a history of stage D NICM s/p HM2 on 9/2017 as DT (due to severe PAD) with TV ring, prior COVID-19 infection 4/2020, recurrent syncope post LVAD s/p ILR, and chronic abdominal pain with prior negative workup who was admitted with symptomatic anemia with Hgb 4.5 in setting of INR 8.8 without hemodynamic instability. He was transfused and underwent VCE which showed active bleeding in the mid small bowel but subsequent enteroscopy 6/15 did not reveal any active bleeding. He acutely decompensated after procedure with fever/hypertension, low flow alarms, and pulmonary infiltrate with hypoxia requiring intubation from probable aspiration PNA. His LDH is normal and there are no signs of overt LVAD dysfunction on echo though signs of insufficiently unloaded ventricle for which his speed has been increased. Course notable for inability to wean ventilator with persistent secretions for which he underwent tracheostomy as well as acute c diff colitis. Worsening anemia over last few days requiring transfusion with low flow alarms, concerning for recurrent GI bleed. Now w/ hypotension and fevers c/f sepsis, Cxs pos for serratia.

## 2021-07-27 NOTE — PROGRESS NOTE ADULT - PROBLEM SELECTOR PLAN 1
- holding metoprolol in setting of sepsis   - hold diuretics in the setting of fluid losses and concern for possible bleed   - Continue home amiodarone 200 mg PO daily  - decrease dobutamine to 2.5  - CVL in place, trend central sats

## 2021-07-27 NOTE — PROGRESS NOTE ADULT - ASSESSMENT
Mr. Vic Quispe initially admitted for melena and anemia in the setting of supratherapeutic INR.  GI reconsulted for worsening anemia.    # Acute blood loss anemia and melena - hemodynamically unchanged. Likely represents recurrent GI bleed. Initial melena workup lead to capsule endoscopy on 6/14/2021 which revealed active bleeding in small bowel.  Single balloon 6/15/2021 revealed old blood in proximal ileum. 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  Plan for repeat capsule endoscopy, with further studies pending results and clinical course.  # Recent melena with presumed small bowel source  # LVAD on anticoagulation with coumadin  # Trach    Recommendations:  - PPI 40mg IV BID  - trend vitals, CBC, and monitor for clinical signs of bleeding  - transfuse for goal hemoglobin >/= 7.0  - Cardiology managing anticoagulation  - no endoscopy planned at this time given fevers, pressor requirement, and patient on ventilator        Tito Guerrier, PGY-4  GI/Hepatology Fellow    MONDAY-FRIDAY 8AM-5PM:  Available via [employer] Teams  Pager# [phone] ([facility]) or [phone] ([facility])  GI Phone# [phone] ([facility])    NON-URGENT CONSULTS:  Please email [email or web address]  AT NIGHT AND ON WEEKENDS:  Contact on-call GI fellow via answering service ([phone]) from 5pm-8am and on weekends/holidays

## 2021-07-27 NOTE — PROGRESS NOTE ADULT - ASSESSMENT
IMPRESSION:  Serratia bacteremia, likely secondary to pneumonia  ICM s/p LVAD  BCx serratia 7/26  Sputum culture serratia 7/7      RECOMMENDATIONS:  - Continue IV cefepime 1gm q8hrs for now, if patient remains hemodynamically unstable, can escalate to IV meropenem 1gm q8hrs  - Continue PO vancomycin 125mg q24hrs while on board-spectrum antibiotics  - Please send deep tracheal aspirate  - Follow up on sensitivities of serratia  - Will continue to follow    * THIS IS AN INCOMPLETE NOTE. FINAL RECOMMENDATION WILL BE UPDATED AFTER DISCUSSING WITH ATTENDING *      Marisa Hall DO  PGY4 ID fellow  Pager: [phone]  [facility] pager ID: [phone]  Please contact me via page or text through [employer] Teams  If after 5PM or on weekends, please call [phone]     66 years old male with a history of stage D NICM s/p HM2 on 9/2017 as DT (due to severe PAD) with TV ring, prior COVID-19 infection 4/2020, recurrent syncope post LVAD s/p ILR, admitted on 6/14/21with symptomatic anemia with Hgb 4.5 in setting of INR 8.8 without hemodynamic instability. He was transfused and underwent VCE which showed active bleeding in the mid small bowel but subsequent enteroscopy 6/15 did not reveal any active bleeding. He acutely decompensated after procedure with fever/hypertension, low flow alarms, and pulmonary infiltrate with hypoxia requiring intubation from probable aspiration PNA. Course notable for inability to wean ventilator with persistent secretions for which he underwent tracheostomy. He developed C. diff colitis and is on vancomycin taper. He is currently vent dependent now and requiring increased pressor support. BCx showed serratia, which correlates to his sputum culture back in 7/7. The source could be pneumonia or intra-abdominal. In the setting transaminitis, will need to look into possible intra-abdominal infection. Serratia is unlikely to cause device infection (gram negative organism) but if his blood culture is persistently positive, patient might need further evaluation on possible device infection.      IMPRESSION:  Serratia bacteremia, secondary to pneumonia vs intra-abdominal infection  C. diff colitis on vancomycin taper  ICM s/p LVAD  Transaminitis, unclear etiology  BCx serratia 7/26  Sputum culture serratia R amp 7/7      RECOMMENDATIONS:  - Agree with escalating to IV meropenem 1gm q8hrs, can give one dose of IV amikacin 10mg/kg if he requires more pressor support  - Continue PO vancomycin 125mg q24hrs while on board-spectrum antibiotics  - Please send deep tracheal aspirate  - CT chest and CT abdomen/pelvis with IV contrast are preferred to look for source of bacteremia when patient is stable; agree with US RUQ for now  - Please repeat blood culture daily  - Follow up on sensitivities of serratia  - Will continue to follow  - Guarded prognosis      Case discussed with attending and primary team      Marisa Hall DO  PGY4 ID fellow  Pager: [phone]  [facility] pager ID: [phone]  Please contact me via page or text through [employer] Teams  If after 5PM or on weekends, please call [phone]

## 2021-07-27 NOTE — PROGRESS NOTE ADULT - ATTENDING COMMENTS
Agree with above. H/H seems to have stabilized after drifting down of INR to therapeutic range - suspect he has intermittent small bowel bleeding especially when he has a supertherapeutic INR - if H/H stable now, especially given new fevers and hypotension, would observe for now and not pursue repeat endoscopic evaluation.

## 2021-07-27 NOTE — PROGRESS NOTE ADULT - ASSESSMENT
Assessment and Recommendation:   · Assessment	  Assessment and recommendation :  Recurrent Acute hypoxic respiratory Failure S/P tracheostomy on full ventilatory  at 50% Fi02  Acute blood loss anemia S/P multiple  blood transfusion   septic shock on vasopressin , levophed and Dobutamine   sepsis with serratia marcescens in the blood on cefepime    RT pneumothorax Pig tail cathter in place   recurrent rectal bleeding capsule endoscopy   S/P Acute left lower lobe pneumonia   Aspiration pneumonia   Stool is  +ve for C-diff. colitis on PO vancomycin  improved   S/P  Ancef improved for bacteremia   Non ischemic cardiomyopathy continue ACE inhibitor and B-Blockers   S/P Septic shock and cardiogenic shock   Stage D systolic heart failure S/P LVAD HM2   MH2 LVAD  with  TV Annuloplasty  Severe peripheral vascular disease   S/P Anion gap metabolic acidosis  severe hyperglycemia on insulin coverage    On  Amiodarone   critical care polyneuropathy   Anemia of Acute blood Loss   S/P Small bowel bleeding   S/P blood and FFP transfusion   Chronic kidney disease stage III   NGT feeding on Jevity   PT and OT at bed side   GI prophylaxis with PPI   critical care time is 40 minutes

## 2021-07-27 NOTE — PROGRESS NOTE ADULT - ATTENDING COMMENTS
Noted to have high fevers (Tm 105) and cultures postivie for Serratia bacteremia. Notably low flowing since yesterday but improved. On exam, JVP approx 8 cm, LVAD hum, CTAB, nontender abdomen, no pedal edema. Labs reviewed - WBC 15 (downtrending), Hb 9 (stable), BUN/Cr 28/1.06,  (stable).   - c/w cefepime for Serratia bacteremia; would plan on CT C/A/P non-contrast   - goal CVP 8-10   - maintain MAP >65  - defer on endoscopy +/- VCE given sepsis   - prognoiss guarded

## 2021-07-27 NOTE — PROGRESS NOTE ADULT - PROBLEM SELECTOR PLAN 4
- No active bleeding seen on past enteroscopy  - more recently, downtrending Hgb despite therapeutic INR   - s/p 2U PRBC 7/23 for Hgb 6.7 with appropriate response  - positive stool guaiac 7/23  - plan for endoscopy when stable   - Continue PPI  - transfuse PRN  - appreciate GI recs

## 2021-07-27 NOTE — PROGRESS NOTE ADULT - SUBJECTIVE AND OBJECTIVE BOX
RICKY HAMPTON  MRN-[id number]  Patient is a 66y old  Male who presents with a chief complaint of Anemia, Supratherapeutic INR, Dark Stools (2021 05:58)    HPI:  65M PMH ACC/AHA stage D HF due to NICM HM2 LVAD , TV annuloplasty ring 17 as destination therapy due to severe peripheral artery disease with significant stenosis  SIADH, Depression, CKD-3 with hyperkalemia, past E. coli UTIs, driveline drainage (21) and COVID-19 (back in 2020)  He was recently seen in clinic where he complained of abdominal pain and dark stools w constipation back in May. He presents to [facility] ER today weakness and fatigue, moderate and + Black stools for three days, on coumadin secondary to warfarin use in the setting of an LVAD. Patient has required transfusions for GIB in the past. Mostly recently back in 2021 pt had anemia with dark stools. No interventions was done at that time. However Last Endoscopy was done in 2020 (negative). Today labs show patient is anemic with H/H of 4.5/16.3,. INR is 8.84 MAP in the 90s, Temp 35.1. He denies any chest pain, shortness of breath, dizziness, abd pain, nausea or vomiting.       (2021 16:57)      Surgery/Hospital Course:   admit for melena w/ anemia, INR 8.84   6/15 Capsul study (+) for small bowel bleed, balloon endoscopy (old blood in prox ileum); post EGD - septic w/ L opacity, re-intubated for concern for aspiration, TTE (Mod MR, decrease biV w/ interventricular septum boweing towards R)   bronch    +C Diff    TC    CT C/A/P: Fluid filled colon which may be 2/2 rapid transit. Small bilateral pleffs with associates. Compressive atelectasis New ISABELLE & LLL  parenchymal opacities, suspicious for pneumonia. 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    #8 Shiley trach at bedside    CPAP trials    LVAD speed increased to 9200   Bronch; Central line dc'ed   TC since , continue as tolerated. Patient transferred to SDU.    Cont PT, no trach downsize today(#8cuffed)/ Anticoagulation/ Continue TF, speech f/u/ Vanco taper    oob to chair  INR  2.47  5 mg coumadin     increased lop to 25 q8  per HF   INR today 2.64.  H&H 7.3 this AM.  Will repeat CBC at noon, and will send stool guaiac Patient with persistent abdominal tenderness, rate of tube feeds decreased.  No nausea/vomiting.     INR today 2.4H&H 9.1/28.6 low flow overnight /N&V  NPO resolved for capsule study  Coumadin on hold refusing Tube feeds on D5 / normal  @50 cc/hr   INR 2.69  H&H 7..1 refusing Tube feeds on D5 2 normal  @50 cc/hr. This am + BM Anusha Oneill HF  aware- PRBC x1  GI team consulted -  NPO  plan on study in am-  D/w Dr Cadet Patient  to return to CTU for further management; 1PRBCS    R PTC for PTX       Today:    REVIEW OF SYSTEMS:  Unable to obtain secondary to pt being trached.     ICU Vital Signs Last 24 Hrs  T(C): 38.3 (2021 08:00), Max: 41 (2021 00:00)  T(F): 100.9 (2021 08:00), Max: 105.8 (2021 00:00)  HR: 115 (2021 08:45) (65 - 125)  BP: --  BP(mean): --  ABP: 98/70 (2021 08:45) (64/59 - 122/74)  ABP(mean): 78 (2021 08:45) (56 - 94)  RR: 41 (2021 08:45) (18 - 83)  SpO2: 100% (2021 08:45) (59% - 100%)      Physical Exam:  Gen:  intubated   CNS: sedated 	  Neck: no JVD, +TC   RES : clear , no wheezing              CVS: +LVAD hum   Abd: Soft, +LLQ tender to palpation   Skin: No rash.  Ext:  no edema    ============================I/O===========================   I&O's Detail    2021 07:  -  2021 07:00  --------------------------------------------------------  IN:    Albumin 5%  - 250 mL: 1050 mL    dextrose 5% + sodium chloride 0.45%: 750 mL    DOBUTamine: 35.4 mL    Enteral Tube Flush: 60 mL    Insulin: 9 mL    IV PiggyBack: 500 mL    IV PiggyBack: 250 mL    multiple electrolytes Injection Type 1 Bolus: 500 mL    Norepinephrine: 245.4 mL    Pantoprazole: 240 mL    Phenylephrine: 260.1 mL    PRBCs (Packed Red Blood Cells): 300 mL    Propofol: 183.9 mL    sodium chloride 0.45%: 100 mL    sodium chloride 0.9%: 300 mL    Vasopressin: 75 mL  Total IN: 4858.8 mL    OUT:    Chest Tube (mL): 105 mL    Dexmedetomidine: 0 mL    Indwelling Catheter - Urethral (mL): 325 mL    Miscellaneous Tube Feedin mL    Voided (mL): 500 mL  Total OUT: 930 mL    Total NET: 3928.8 mL      2021 07:01  -  2021 09:13  --------------------------------------------------------  IN:    DOBUTamine: 17.7 mL    Insulin: 4 mL    Pantoprazole: 20 mL    Propofol: 47.2 mL    sodium chloride 0.9%: 100 mL    Vasopressin: 10 mL  Total IN: 198.9 mL    OUT:    Chest Tube (mL): 10 mL    Dexmedetomidine: 0 mL    Indwelling Catheter - Urethral (mL): 60 mL    Miscellaneous Tube Feedin mL    Norepinephrine: 0 mL  Total OUT: 70 mL    Total NET: 128.9 mL        ============================ LABS =========================                        9.0    15.62 )-----------( 176      ( 2021 04:13 )             28.0         129<L>  |  100  |  28<H>  ----------------------------<  251<H>  4.6   |  20<L>  |  1.06    Ca    8.5      2021 00:34  Phos  2.1       Mg     2.2         TPro  6.7  /  Alb  3.4  /  TBili  1.1  /  DBili  x   /  AST  68<H>  /  ALT  48<H>  /  AlkPhos  171<H>      LIVER FUNCTIONS - ( 2021 00:34 )  Alb: 3.4 g/dL / Pro: 6.7 g/dL / ALK PHOS: 171 U/L / ALT: 48 U/L / AST: 68 U/L / GGT: x           PT/INR - ( 2021 11:11 )   PT: 32.4 sec;   INR: 2.84 ratio         PTT - ( 2021 11:11 )  PTT:35.5 sec  ABG - ( 2021 08:12 )  pH, Arterial: 7.38  pH, Blood: x     /  pCO2: 36    /  pO2: 133   / HCO3: 21    / Base Excess: -3.3  /  SaO2: 99                Urinalysis Basic - ( 2021 08:44 )    Color: Yellow / Appearance: Clear / S.034 / pH: x  Gluc: x / Ketone: Moderate  / Bili: Small / Urobili: Negative   Blood: x / Protein: 30 mg/dL / Nitrite: Negative   Leuk Esterase: Negative / RBC: 1 /hpf / WBC 2 /HPF   Sq Epi: x / Non Sq Epi: 0 /hpf / Bacteria: Negative      ======================Micro/Rad/Cardio=================  Culture: Reviewed   CXR: Reviewed  Echo:Reviewed  ======================================================  PAST MEDICAL & SURGICAL HISTORY:  CHF (congestive heart failure)    CAD (coronary artery disease)    Depression    Pleural effusion    History of 2019 novel coronavirus disease (COVID-19)  2020    Hemorrhoids    Bleeding hemorrhoids    Peripheral arterial disease    Claudication    BPH with urinary obstruction    ACC/AHA stage D systolic heart failure    Anticoagulation goal of INR 2.0 to 2.5    Falls    Clavicle fracture    CKD (chronic kidney disease), stage III    Iron deficiency anemia    H/O epistaxis    Vertigo    GI bleed    S/P TVR (tricuspid valve repair)    S/P ventricular assist device    S/P endoscopy      ====================ASSESSMENT ==============  Stage D Nonischemic Cardiomyopathy, Status Post HM2 on 2017    Cardiogenic shock  Hemodynamic instability   Acute hypoxemic respiratory failure  GI bleed , Status Post Enteroscopy   Anemia, in setting of melena   Chronic Kidney Disease  Stress hyperglycemia   C.diff positive on    Hypovolemic shock  Hyponatremia   Leukocytosis    Plan:  ====================== NEUROLOGY=====================  Sedated with IV Precedex and IV Propofol   Tylenol PRN for analgesia   C/w clonazepam for agitation    acetaminophen    Suspension .. 650 milliGRAM(s) Oral every 6 hours PRN Mild Pain (1 - 3)  clonazePAM  Tablet 0.5 milliGRAM(s) Oral at bedtime  dexMEDEtomidine Infusion 1.5 MICROgram(s)/kG/Hr (29.4 mL/Hr) IV Continuous <Continuous>  propofol Infusion 25 MICROgram(s)/kG/Min (11.8 mL/Hr) IV Continuous <Continuous>    ==================== RESPIRATORY======================  Acute hypoxemic resp failure s/p #8 shiley trach on on    Requiring full vent support, continue close monitoring of respiratory rate and breathing pattern, following of ABG’s with A-line monitoring, continuous pulse oximetry monitoring.   R PTC placed on  for mod R PTX    Mechanical Ventilation:  Mode: AC/ CMV (Assist Control/ Continuous Mandatory Ventilation)  RR (machine): 16  TV (machine): 500  FiO2: 50  PEEP: 5  ITime: 1  MAP: 14  PIP: 24      ====================CARDIOVASCULAR==================  Stage D Nonischemic Cardiomyopathy, Status Post HM2 on 2017; LVAD settings 9200 with flow of 4.1   TTE : reveals at least moderate MR, mild AI, severe global LV syst dysfxn, dec RV fxn.  Continue invasive hemodynamic monitoring   Required pressor support with IV Levophed and IV Vasopressin   Inotropic support with IV Dobutamine   Rate control with amiodarone     aMIOdarone    Tablet 200 milliGRAM(s) Oral daily  DOBUTamine Infusion 5 MICROgram(s)/kG/Min (11.8 mL/Hr) IV Continuous <Continuous>  norepinephrine Infusion 0.05 MICROgram(s)/kG/Min (7.36 mL/Hr) IV Continuous <Continuous>  vasopressin Infusion 0.017 Unit(s)/Min (1 mL/Hr) IV Continuous <Continuous>    ===================HEMATOLOGIC/ONC ===================  Anemia due to acute blood loss associated with upper GI bleed   Continue to monitor H&H/Plts     ===================== RENAL =========================  Continue monitoring urine output, I&OS, BUN/Cr   Replete lytes PRN. Keep K> 4 and Mg >2     ==================== GASTROINTESTINAL===================  GI bleed in setting of recent melena, GI following  Enteral feeding held   Continue IV Protonix drip for GI bleed     pantoprazole Infusion 8 mG/Hr (10 mL/Hr) IV Continuous <Continuous>  simethicone 80 milliGRAM(s) Chew every 8 hours PRN Dyspepsia  sodium chloride 0.9%. 1000 milliLiter(s) (50 mL/Hr) IV Continuous <Continuous>    =======================    ENDOCRINE  =====================  Stress hyperglycemia, continue glucose control with IV insulin drip     dextrose 50% Injectable 50 milliLiter(s) IV Push every 15 minutes  insulin regular Infusion 3 Unit(s)/Hr (3 mL/Hr) IV Continuous <Continuous>    ========================INFECTIOUS DISEASE================  Temp 100.9F, WBC downtrending 22.14->15.62   Monitor temperature and trend WBC.   : C.Diff positive, c/w PO vancomycin   BCx+  +Serratia marcescens  Empiric coverage with cefepime     cefepime   IVPB 1000 milliGRAM(s) IV Intermittent every 8 hours  vancomycin    Solution 125 milliGRAM(s) Oral daily      Patient requires continuous monitoring with bedside rhythm monitoring, pulse ox monitoring, and intermittent blood gas analysis. Care plan discussed with ICU care team. Patient remained critical and at risk for life threatening decompensation.     By signing my name below, I, Agnieszka Cherry, attest that this documentation has been prepared under the direction and in the presence of CLAUDIA Pak   Electronically signed: Romel Shaffer, 21 @ 09:13    I, Lianne Azar,, personally performed the services described in this documentation. all medical record entries made by the romel were at my direction and in my presence. I have reviewed the chart and agree that the record reflects my personal performance and is accurate and complete  Electronically signed: CLAUDIA Pak        RICKY HAMPTON  MRN-[id number]  Patient is a 66y old  Male who presents with a chief complaint of Anemia, Supratherapeutic INR, Dark Stools (2021 05:58)    HPI:  65M PMH ACC/AHA stage D HF due to NICM HM2 LVAD , TV annuloplasty ring 17 as destination therapy due to severe peripheral artery disease with significant stenosis  SIADH, Depression, CKD-3 with hyperkalemia, past E. coli UTIs, driveline drainage (21) and COVID-19 (back in 2020)  He was recently seen in clinic where he complained of abdominal pain and dark stools w constipation back in May. He presents to [facility] ER today weakness and fatigue, moderate and + Black stools for three days, on coumadin secondary to warfarin use in the setting of an LVAD. Patient has required transfusions for GIB in the past. Mostly recently back in 2021 pt had anemia with dark stools. No interventions was done at that time. However Last Endoscopy was done in 2020 (negative). Today labs show patient is anemic with H/H of 4.5/16.3,. INR is 8.84 MAP in the 90s, Temp 35.1. He denies any chest pain, shortness of breath, dizziness, abd pain, nausea or vomiting.       (2021 16:57)      Surgery/Hospital Course:   admit for melena w/ anemia, INR 8.84   6/15 Capsul study (+) for small bowel bleed, balloon endoscopy (old blood in prox ileum); post EGD - septic w/ L opacity, re-intubated for concern for aspiration, TTE (Mod MR, decrease biV w/ interventricular septum boweing towards R)   bronch    +C Diff    TC    CT C/A/P: Fluid filled colon which may be 2/2 rapid transit. Small bilateral pleffs with associates. Compressive atelectasis New ISABELLE & LLL  parenchymal opacities, suspicious for pneumonia. 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    #8 Shiley trach at bedside    CPAP trials    LVAD speed increased to 9200   Bronch; Central line dc'ed   TC since , continue as tolerated. Patient transferred to SDU.    Cont PT, no trach downsize today(#8cuffed)/ Anticoagulation/ Continue TF, speech f/u/ Vanco taper    oob to chair  INR  2.47  5 mg coumadin     increased lop to 25 q8  per HF   INR today 2.64.  H&H 7.324 this AM.  Will repeat CBC at noon, and will send stool guaiac Patient with persistent abdominal tenderness, rate of tube feeds decreased.  No nausea/vomiting.     INR today 2.4H&H 9.1/28.6 low flow overnight /N&V  NPO resolved for capsule study  Coumadin on hold refusing Tube feeds on D5 / normal  @50 cc/hr   INR 2.69  H&H 7..1 refusing Tube feeds on D5 1/2 normal  @50 cc/hr. This am + BM Anusha Oneill HF  aware- PRBC x1  GI team consulted -  NPO  plan on study in am-  D/w Dr Cadet Patient  to return to CTU for further management; 1PRBCS    R PTC for PTX       Today:  Febrile overnight, BCx+  +Serratia marcescens. Plan to d/c Vancomycin, continue cefepime for coverage. Will send repeat cultures today. Plan for KATERINE canelao to eval for possible source of infection. Inotropic support with IV Dobutamine decreased from 5->2.5mcg, maintain MAPs 70s-80s. Maintain pressor support with IV Vasopressin, on and off IV Levophed. R PTC placed on  for mod R PTX, continue to monitor output.     REVIEW OF SYSTEMS:  Unable to obtain secondary to pt being trached.     ICU Vital Signs Last 24 Hrs  T(C): 38.3 (2021 08:00), Max: 41 (2021 00:00)  T(F): 100.9 (2021 08:00), Max: 105.8 (2021 00:00)  HR: 115 (2021 08:45) (65 - 125)  BP: --  BP(mean): --  ABP: 98/70 (2021 08:45) (64/59 - 122/74)  ABP(mean): 78 (2021 08:45) (56 - 94)  RR: 41 (2021 08:45) (18 - 83)  SpO2: 100% (2021 08:45) (59% - 100%)      Physical Exam:  Gen:  intubated   CNS: sedated 	  Neck: no JVD, +TC   RES : clear , no wheezing              CVS: +LVAD hum   Abd: Soft, +LLQ tender to palpation   Skin: No rash.  Ext:  no edema    ============================I/O===========================   I&O's Detail    2021 07:01  -  2021 07:00  --------------------------------------------------------  IN:    Albumin 5%  - 250 mL: 1050 mL    dextrose 5% + sodium chloride 0.45%: 750 mL    DOBUTamine: 35.4 mL    Enteral Tube Flush: 60 mL    Insulin: 9 mL    IV PiggyBack: 500 mL    IV PiggyBack: 250 mL    multiple electrolytes Injection Type 1 Bolus: 500 mL    Norepinephrine: 245.4 mL    Pantoprazole: 240 mL    Phenylephrine: 260.1 mL    PRBCs (Packed Red Blood Cells): 300 mL    Propofol: 183.9 mL    sodium chloride 0.45%: 100 mL    sodium chloride 0.9%: 300 mL    Vasopressin: 75 mL  Total IN: 4858.8 mL    OUT:    Chest Tube (mL): 105 mL    Dexmedetomidine: 0 mL    Indwelling Catheter - Urethral (mL): 325 mL    Miscellaneous Tube Feedin mL    Voided (mL): 500 mL  Total OUT: 930 mL    Total NET: 3928.8 mL      2021 07:01  -  2021 09:13  --------------------------------------------------------  IN:    DOBUTamine: 17.7 mL    Insulin: 4 mL    Pantoprazole: 20 mL    Propofol: 47.2 mL    sodium chloride 0.9%: 100 mL    Vasopressin: 10 mL  Total IN: 198.9 mL    OUT:    Chest Tube (mL): 10 mL    Dexmedetomidine: 0 mL    Indwelling Catheter - Urethral (mL): 60 mL    Miscellaneous Tube Feedin mL    Norepinephrine: 0 mL  Total OUT: 70 mL    Total NET: 128.9 mL        ============================ LABS =========================                        9.0    15.62 )-----------( 176      ( 2021 04:13 )             28.0     07    129<L>  |  100  |  28<H>  ----------------------------<  251<H>  4.6   |  20<L>  |  1.06    Ca    8.5      2021 00:34  Phos  2.1       Mg     2.2         TPro  6.7  /  Alb  3.4  /  TBili  1.1  /  DBili  x   /  AST  68<H>  /  ALT  48<H>  /  AlkPhos  171<H>      LIVER FUNCTIONS - ( 2021 00:34 )  Alb: 3.4 g/dL / Pro: 6.7 g/dL / ALK PHOS: 171 U/L / ALT: 48 U/L / AST: 68 U/L / GGT: x           PT/INR - ( 2021 11:11 )   PT: 32.4 sec;   INR: 2.84 ratio         PTT - ( 2021 11:11 )  PTT:35.5 sec  ABG - ( 2021 08:12 )  pH, Arterial: 7.38  pH, Blood: x     /  pCO2: 36    /  pO2: 133   / HCO3: 21    / Base Excess: -3.3  /  SaO2: 99                Urinalysis Basic - ( 2021 08:44 )    Color: Yellow / Appearance: Clear / S.034 / pH: x  Gluc: x / Ketone: Moderate  / Bili: Small / Urobili: Negative   Blood: x / Protein: 30 mg/dL / Nitrite: Negative   Leuk Esterase: Negative / RBC: 1 /hpf / WBC 2 /HPF   Sq Epi: x / Non Sq Epi: 0 /hpf / Bacteria: Negative      ======================Micro/Rad/Cardio=================  Culture: Reviewed   CXR: Reviewed  Echo:Reviewed  ======================================================  PAST MEDICAL & SURGICAL HISTORY:  CHF (congestive heart failure)    CAD (coronary artery disease)    Depression    Pleural effusion    History of 2019 novel coronavirus disease (COVID-19)  2020    Hemorrhoids    Bleeding hemorrhoids    Peripheral arterial disease    Claudication    BPH with urinary obstruction    ACC/AHA stage D systolic heart failure    Anticoagulation goal of INR 2.0 to 2.5    Falls    Clavicle fracture    CKD (chronic kidney disease), stage III    Iron deficiency anemia    H/O epistaxis    Vertigo    GI bleed    S/P TVR (tricuspid valve repair)    S/P ventricular assist device    S/P endoscopy      ====================ASSESSMENT ==============  Stage D Nonischemic Cardiomyopathy, Status Post HM2 on 2017    Cardiogenic shock  Hemodynamic instability   Acute hypoxemic respiratory failure  GI bleed , Status Post Enteroscopy   Anemia, in setting of melena   Chronic Kidney Disease  Stress hyperglycemia   C.diff positive on    Hypovolemic shock  Hyponatremia   Leukocytosis    Plan:  ====================== NEUROLOGY=====================  Sedated with IV Precedex and IV Propofol   Tylenol PRN for analgesia   C/w clonazepam for agitation    acetaminophen    Suspension .. 650 milliGRAM(s) Oral every 6 hours PRN Mild Pain (1 - 3)  clonazePAM  Tablet 0.5 milliGRAM(s) Oral at bedtime  dexMEDEtomidine Infusion 1.5 MICROgram(s)/kG/Hr (29.4 mL/Hr) IV Continuous <Continuous>  propofol Infusion 25 MICROgram(s)/kG/Min (11.8 mL/Hr) IV Continuous <Continuous>    ==================== RESPIRATORY======================  Acute hypoxemic resp failure s/p #8 shiley trach on on    Requiring full vent support, continue close monitoring of respiratory rate and breathing pattern, following of ABG’s with A-line monitoring, continuous pulse oximetry monitoring.   R PTC placed on  for mod R PTX, continue to monitor output.     Mechanical Ventilation:  Mode: AC/ CMV (Assist Control/ Continuous Mandatory Ventilation)  RR (machine): 16  TV (machine): 500  FiO2: 50  PEEP: 5  ITime: 1  MAP: 14  PIP: 24      ====================CARDIOVASCULAR==================  Stage D Nonischemic Cardiomyopathy, Status Post HM2 on 2017; LVAD settings 9200 with flow of 4.1   TTE : reveals at least moderate MR, mild AI, severe global LV syst dysfxn, dec RV fxn.  Continue invasive hemodynamic monitoring   Inotropic support with IV Dobutamine decreased from 5->2.5mcg, maintain MAPs 70s-80s.   Maintain pressor support with IV Vasopressin, on and off IV Levophed   Rate control with amiodarone     aMIOdarone    Tablet 200 milliGRAM(s) Oral daily  DOBUTamine Infusion 5 MICROgram(s)/kG/Min (11.8 mL/Hr) IV Continuous <Continuous>  norepinephrine Infusion 0.05 MICROgram(s)/kG/Min (7.36 mL/Hr) IV Continuous <Continuous>  vasopressin Infusion 0.017 Unit(s)/Min (1 mL/Hr) IV Continuous <Continuous>    ===================HEMATOLOGIC/ONC ===================  Anemia due to acute blood loss associated with upper GI bleed   Continue to monitor H&H/Plts     ===================== RENAL =========================  Continue monitoring urine output, I&OS, BUN/Cr   Replete lytes PRN. Keep K> 4 and Mg >2     ==================== GASTROINTESTINAL===================  GI bleed in setting of recent melena, GI following  Enteral feeding held   Continue IV Protonix drip for GI bleed     pantoprazole Infusion 8 mG/Hr (10 mL/Hr) IV Continuous <Continuous>  simethicone 80 milliGRAM(s) Chew every 8 hours PRN Dyspepsia  sodium chloride 0.9%. 1000 milliLiter(s) (50 mL/Hr) IV Continuous <Continuous>    =======================    ENDOCRINE  =====================  Stress hyperglycemia, continue glucose control with IV insulin drip     dextrose 50% Injectable 50 milliLiter(s) IV Push every 15 minutes  insulin regular Infusion 3 Unit(s)/Hr (3 mL/Hr) IV Continuous <Continuous>    ========================INFECTIOUS DISEASE================  Febrile overnight, Temp now 100.9F, WBC downtrending 22.14->15.62   Monitor temperature and trend WBC   BCx+ 7/26 +Serratia marcescens. Will send repeat cultures today.   Plan to d/c Vancomycin, continue cefepime for coverage  Plan for RUQ sono to eval for possible source of infection    cefepime   IVPB 1000 milliGRAM(s) IV Intermittent every 8 hours  vancomycin    Solution 125 milliGRAM(s) Oral daily      Patient requires continuous monitoring with bedside rhythm monitoring, pulse ox monitoring, and intermittent blood gas analysis. Care plan discussed with ICU care team. Patient remained critical and at risk for life threatening decompensation.     By signing my name below, I, Agnieszka Cherry, attest that this documentation has been prepared under the direction and in the presence of CLAUDIA Pak   Electronically signed: Romel Shaffer, 21 @ 09:13    I, Lianne Azar,, personally performed the services described in this documentation. all medical record entries made by the romel were at my direction and in my presence. I have reviewed the chart and agree that the record reflects my personal performance and is accurate and complete  Electronically signed: Lianne Azar, PA        RICKY HAMPTON  MRN-[id number]  Patient is a 66y old  Male who presents with a chief complaint of Anemia, Supratherapeutic INR, Dark Stools (2021 05:58)    HPI:  65M PMH ACC/AHA stage D HF due to NICM HM2 LVAD , TV annuloplasty ring 17 as destination therapy due to severe peripheral artery disease with significant stenosis  SIADH, Depression, CKD-3 with hyperkalemia, past E. coli UTIs, driveline drainage (21) and COVID-19 (back in 2020)  He was recently seen in clinic where he complained of abdominal pain and dark stools w constipation back in May. He presents to [facility] ER today weakness and fatigue, moderate and + Black stools for three days, on coumadin secondary to warfarin use in the setting of an LVAD. Patient has required transfusions for GIB in the past. Mostly recently back in 2021 pt had anemia with dark stools. No interventions was done at that time. However Last Endoscopy was done in 2020 (negative). Today labs show patient is anemic with H/H of 4.5/16.3,. INR is 8.84 MAP in the 90s, Temp 35.1. He denies any chest pain, shortness of breath, dizziness, abd pain, nausea or vomiting.       (2021 16:57)      Surgery/Hospital Course:   admit for melena w/ anemia, INR 8.84   6/15 Capsul study (+) for small bowel bleed, balloon endoscopy (old blood in prox ileum); post EGD - septic w/ L opacity, re-intubated for concern for aspiration, TTE (Mod MR, decrease biV w/ interventricular septum boweing towards R)   bronch    +C Diff    TC    CT C/A/P: Fluid filled colon which may be 2/2 rapid transit. Small bilateral pleffs with associates. Compressive atelectasis New ISABELLE & LLL  parenchymal opacities, suspicious for pneumonia. 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    #8 Shiley trach at bedside    CPAP trials    LVAD speed increased to 9200   Bronch; Central line dc'ed   TC since , continue as tolerated. Patient transferred to SDU.    Cont PT, no trach downsize today(#8cuffed)/ Anticoagulation/ Continue TF, speech f/u/ Vanco taper    oob to chair  INR  2.47  5 mg coumadin     increased lop to 25 q8  per HF   INR today 2.64.  H&H 7.3 this AM.  Will repeat CBC at noon, and will send stool guaiac Patient with persistent abdominal tenderness, rate of tube feeds decreased.  No nausea/vomiting.     INR today 2.4H&H 9.1/28.6 low flow overnight /N&V  NPO resolved for capsule study  Coumadin on hold refusing Tube feeds on D5 1/ normal  @50 cc/hr   INR 2.69  H&H 7..1 refusing Tube feeds on D5 1/2 normal  @50 cc/hr. This am + BM Anusha Oneill HF  aware- PRBC x1  GI team consulted -  NPO  plan on study in am-  D/w Dr Cadet Patient  to return to CTU for further management; 1PRBCS    Post op INR 2.2 today.  No bleeding. BC + for SM.  Pt is hypotensive requiring pressor and inotropic support.  ID follow up today on Cefepime will follow.   R PTC for PTX       Today:  Febrile overnight, BCx+  +Serratia marcescens. Plan to d/c Vancomycin, continue cefepime for coverage. Will send repeat cultures today. Plan for RUQ sono to eval for possible source of infection. Inotropic support with IV Dobutamine decreased from 5->2.5mcg, maintain MAPs 70s-80s. Maintain pressor support with IV Vasopressin, on and off IV Levophed. R PTC placed on  for mod R PTX, continue to monitor output.     REVIEW OF SYSTEMS:  Unable to obtain secondary to pt being trached.     ICU Vital Signs Last 24 Hrs  T(C): 38.3 (2021 08:00), Max: 41 (2021 00:00)  T(F): 100.9 (2021 08:00), Max: 105.8 (2021 00:00)  HR: 115 (2021 08:45) (65 - 125)  BP: --  BP(mean): --  ABP: 98/70 (2021 08:45) (64/59 - 122/74)  ABP(mean): 78 (2021 08:45) (56 - 94)  RR: 41 (2021 08:45) (18 - 83)  SpO2: 100% (2021 08:45) (59% - 100%)      Physical Exam:  Gen:  intubated   CNS: sedated 	  Neck: no JVD, +TC   RES : clear , no wheezing              CVS: +LVAD hum   Abd: Soft, +LLQ tender to palpation   Skin: No rash.  Ext:  no edema    ============================I/O===========================   I&O's Detail    2021 07:01  -  2021 07:00  --------------------------------------------------------  IN:    Albumin 5%  - 250 mL: 1050 mL    dextrose 5% + sodium chloride 0.45%: 750 mL    DOBUTamine: 35.4 mL    Enteral Tube Flush: 60 mL    Insulin: 9 mL    IV PiggyBack: 500 mL    IV PiggyBack: 250 mL    multiple electrolytes Injection Type 1 Bolus: 500 mL    Norepinephrine: 245.4 mL    Pantoprazole: 240 mL    Phenylephrine: 260.1 mL    PRBCs (Packed Red Blood Cells): 300 mL    Propofol: 183.9 mL    sodium chloride 0.45%: 100 mL    sodium chloride 0.9%: 300 mL    Vasopressin: 75 mL  Total IN: 4858.8 mL    OUT:    Chest Tube (mL): 105 mL    Dexmedetomidine: 0 mL    Indwelling Catheter - Urethral (mL): 325 mL    Miscellaneous Tube Feedin mL    Voided (mL): 500 mL  Total OUT: 930 mL    Total NET: 3928.8 mL      2021 07:01  -  2021 09:13  --------------------------------------------------------  IN:    DOBUTamine: 17.7 mL    Insulin: 4 mL    Pantoprazole: 20 mL    Propofol: 47.2 mL    sodium chloride 0.9%: 100 mL    Vasopressin: 10 mL  Total IN: 198.9 mL    OUT:    Chest Tube (mL): 10 mL    Dexmedetomidine: 0 mL    Indwelling Catheter - Urethral (mL): 60 mL    Miscellaneous Tube Feedin mL    Norepinephrine: 0 mL  Total OUT: 70 mL    Total NET: 128.9 mL        ============================ LABS =========================                        9.0    15.62 )-----------( 176      ( 2021 04:13 )             28.0         129<L>  |  100  |  28<H>  ----------------------------<  251<H>  4.6   |  20<L>  |  1.06    Ca    8.5      2021 00:34  Phos  2.1       Mg     2.2         TPro  6.7  /  Alb  3.4  /  TBili  1.1  /  DBili  x   /  AST  68<H>  /  ALT  48<H>  /  AlkPhos  171<H>      LIVER FUNCTIONS - ( 2021 00:34 )  Alb: 3.4 g/dL / Pro: 6.7 g/dL / ALK PHOS: 171 U/L / ALT: 48 U/L / AST: 68 U/L / GGT: x           PT/INR - ( 2021 11:11 )   PT: 32.4 sec;   INR: 2.84 ratio         PTT - ( 2021 11:11 )  PTT:35.5 sec  ABG - ( 2021 08:12 )  pH, Arterial: 7.38  pH, Blood: x     /  pCO2: 36    /  pO2: 133   / HCO3: 21    / Base Excess: -3.3  /  SaO2: 99                Urinalysis Basic - ( 2021 08:44 )    Color: Yellow / Appearance: Clear / S.034 / pH: x  Gluc: x / Ketone: Moderate  / Bili: Small / Urobili: Negative   Blood: x / Protein: 30 mg/dL / Nitrite: Negative   Leuk Esterase: Negative / RBC: 1 /hpf / WBC 2 /HPF   Sq Epi: x / Non Sq Epi: 0 /hpf / Bacteria: Negative      ======================Micro/Rad/Cardio=================  Culture: Reviewed   CXR: Reviewed  Echo:Reviewed  ======================================================  PAST MEDICAL & SURGICAL HISTORY:  CHF (congestive heart failure)    CAD (coronary artery disease)    Depression    Pleural effusion    History of  novel coronavirus disease (COVID-19)  2020    Hemorrhoids    Bleeding hemorrhoids    Peripheral arterial disease    Claudication    BPH with urinary obstruction    ACC/AHA stage D systolic heart failure    Anticoagulation goal of INR 2.0 to 2.5    Falls    Clavicle fracture    CKD (chronic kidney disease), stage III    Iron deficiency anemia    H/O epistaxis    Vertigo    GI bleed    S/P TVR (tricuspid valve repair)    S/P ventricular assist device    S/P endoscopy      ====================ASSESSMENT ==============  Stage D Nonischemic Cardiomyopathy, Status Post HM2 on 2017    Cardiogenic shock  Hemodynamic instability   Acute hypoxemic respiratory failure  GI bleed , Status Post Enteroscopy   Anemia, in setting of melena   Chronic Kidney Disease  Stress hyperglycemia   C.diff positive on    Hypovolemic shock  Hyponatremia   Leukocytosis    Plan:  ====================== NEUROLOGY=====================  Sedated with IV Precedex and IV Propofol   Tylenol PRN for analgesia   C/w clonazepam for agitation    acetaminophen    Suspension .. 650 milliGRAM(s) Oral every 6 hours PRN Mild Pain (1 - 3)  clonazePAM  Tablet 0.5 milliGRAM(s) Oral at bedtime  dexMEDEtomidine Infusion 1.5 MICROgram(s)/kG/Hr (29.4 mL/Hr) IV Continuous <Continuous>  propofol Infusion 25 MICROgram(s)/kG/Min (11.8 mL/Hr) IV Continuous <Continuous>    ==================== RESPIRATORY======================  Acute hypoxemic resp failure s/p #8 shiley trach on on    Requiring full vent support, continue close monitoring of respiratory rate and breathing pattern, following of ABG’s with A-line monitoring, continuous pulse oximetry monitoring.   R PTC placed on  for mod R PTX, continue to monitor output.     Mechanical Ventilation:  Mode: AC/ CMV (Assist Control/ Continuous Mandatory Ventilation)  RR (machine): 16  TV (machine): 500  FiO2: 50  PEEP: 5  ITime: 1  MAP: 14  PIP: 24      ====================CARDIOVASCULAR==================  Stage D Nonischemic Cardiomyopathy, Status Post HM2 on 2017; LVAD settings 9200 with flow of 4.1   TTE : reveals at least moderate MR, mild AI, severe global LV syst dysfxn, dec RV fxn.  Continue invasive hemodynamic monitoring   Inotropic support with IV Dobutamine decreased from 5->2.5mcg, maintain MAPs 70s-80s.   Maintain pressor support with IV Vasopressin, on and off IV Levophed   Rate control with amiodarone     aMIOdarone    Tablet 200 milliGRAM(s) Oral daily  DOBUTamine Infusion 5 MICROgram(s)/kG/Min (11.8 mL/Hr) IV Continuous <Continuous>  norepinephrine Infusion 0.05 MICROgram(s)/kG/Min (7.36 mL/Hr) IV Continuous <Continuous>  vasopressin Infusion 0.017 Unit(s)/Min (1 mL/Hr) IV Continuous <Continuous>    ===================HEMATOLOGIC/ONC ===================  Anemia due to acute blood loss associated with upper GI bleed   Continue to monitor H&H/Plts     ===================== RENAL =========================  Continue monitoring urine output, I&OS, BUN/Cr   Replete lytes PRN. Keep K> 4 and Mg >2     ==================== GASTROINTESTINAL===================  GI bleed in setting of recent melena, GI following  Enteral feeding held   Continue IV Protonix drip for GI bleed     pantoprazole Infusion 8 mG/Hr (10 mL/Hr) IV Continuous <Continuous>  simethicone 80 milliGRAM(s) Chew every 8 hours PRN Dyspepsia  sodium chloride 0.9%. 1000 milliLiter(s) (50 mL/Hr) IV Continuous <Continuous>    =======================    ENDOCRINE  =====================  Stress hyperglycemia, continue glucose control with IV insulin drip     dextrose 50% Injectable 50 milliLiter(s) IV Push every 15 minutes  insulin regular Infusion 3 Unit(s)/Hr (3 mL/Hr) IV Continuous <Continuous>    ========================INFECTIOUS DISEASE================  Febrile overnight, Temp now 100.9F, WBC downtrending 22.14->15.62   Monitor temperature and trend WBC   BCx+ 7/26 +Serratia marcescens. Will send repeat cultures today.   Plan to d/c Vancomycin, continue cefepime for coverage  Plan for RUQ sono to eval for possible source of infection    cefepime   IVPB 1000 milliGRAM(s) IV Intermittent every 8 hours  vancomycin    Solution 125 milliGRAM(s) Oral daily      Patient requires continuous monitoring with bedside rhythm monitoring, pulse ox monitoring, and intermittent blood gas analysis. Care plan discussed with ICU care team. Patient remained critical and at risk for life threatening decompensation.     By signing my name below, I, Agnieszka Cherry, attest that this documentation has been prepared under the direction and in the presence of CLAUDIA Pak   Electronically signed: Cesia Shaffer, 21 @ 09:13    I, Lianne Azar,, personally performed the services described in this documentation. all medical record entries made by the luisibe were at my direction and in my presence. I have reviewed the chart and agree that the record reflects my personal performance and is accurate and complete  Electronically signed: CLAUDIA Pak

## 2021-07-27 NOTE — PROGRESS NOTE ADULT - PROBLEM SELECTOR PLAN 2
- Current speed 9200 RPM  - HOLD coumadin given concern of bleed  - Will plan to hold aspirin indefinitely given recurrent GIB.  - Continue to monitor LDH daily

## 2021-07-27 NOTE — PROGRESS NOTE ADULT - SUBJECTIVE AND OBJECTIVE BOX
Patient seen and examined at bedside.    Overnight Events: CVL placed overnight c/b pneumothorax. Febrile overnight, hypotension req vaso and levo, broad spectrum Abx, back on vent,  initiated at 5.     Review Of Systems: No chest pain, shortness of breath, or palpitations            Current Meds:  acetaminophen    Suspension .. 650 milliGRAM(s) Oral every 6 hours PRN  acetaminophen  IVPB .. 1000 milliGRAM(s) IV Intermittent once  aMIOdarone    Tablet 200 milliGRAM(s) Oral daily  cefepime   IVPB      cefepime   IVPB 1000 milliGRAM(s) IV Intermittent every 8 hours  chlorhexidine 0.12% Liquid 15 milliLiter(s) Oral Mucosa every 12 hours  chlorhexidine 2% Cloths 1 Application(s) Topical <User Schedule>  clonazePAM  Tablet 0.5 milliGRAM(s) Oral at bedtime  dexMEDEtomidine Infusion 1.5 MICROgram(s)/kG/Hr IV Continuous <Continuous>  dextrose 50% Injectable 50 milliLiter(s) IV Push every 15 minutes  DOBUTamine Infusion 2.5 MICROgram(s)/kG/Min IV Continuous <Continuous>  insulin regular Infusion 3 Unit(s)/Hr IV Continuous <Continuous>  norepinephrine Infusion 0.05 MICROgram(s)/kG/Min IV Continuous <Continuous>  pantoprazole Infusion 8 mG/Hr IV Continuous <Continuous>  propofol Infusion 25 MICROgram(s)/kG/Min IV Continuous <Continuous>  simethicone 80 milliGRAM(s) Chew every 8 hours PRN  sodium chloride 0.9%. 1000 milliLiter(s) IV Continuous <Continuous>  vancomycin    Solution 125 milliGRAM(s) Oral daily  vasopressin Infusion 0.017 Unit(s)/Min IV Continuous <Continuous>      Vitals:  T(F): 100.9 (), Max: 105.8 ()  HR: 119 () (65 - 125)  BP: --  RR: 39 ()  SpO2: 100% ()  I&O's Summary    2021 07:01  -  2021 07:00  --------------------------------------------------------  IN: 4858.8 mL / OUT: 930 mL / NET: 3928.8 mL    2021 07:01  -  2021 11:45  --------------------------------------------------------  IN: 628.9 mL / OUT: 140 mL / NET: 488.9 mL        Physical Exam:  Appearance: No acute distress  Cardiovascular: RRR, S1, S2, no murmurs, rubs, or gallops; mild edema   Respiratory: coarse breath sounds   Gastrointestinal: soft, non-tender, non-distended with normal bowel sounds  Musculoskeletal: No clubbing; no joint deformity   Neurologic: Non-focal                          9.0    15.62 )-----------( 176      ( 2021 04:13 )             28.0         129<L>  |  100  |  28<H>  ----------------------------<  251<H>  4.6   |  20<L>  |  1.06    Ca    8.5      2021 00:34  Phos  2.1       Mg     2.2         TPro  6.7  /  Alb  3.4  /  TBili  1.1  /  DBili  x   /  AST  68<H>  /  ALT  48<H>  /  AlkPhos  171<H>      PT/INR - ( 2021 11:11 )   PT: 32.4 sec;   INR: 2.84 ratio         PTT - ( 2021 11:11 )  PTT:35.5 sec

## 2021-07-27 NOTE — PROGRESS NOTE ADULT - PROBLEM SELECTOR PLAN 5
-s/p trach 6/25, now back on vent in setting of sepsis and pneumothorax  -chest tube in place, management per CTU team

## 2021-07-27 NOTE — PROGRESS NOTE ADULT - SUBJECTIVE AND OBJECTIVE BOX
INFECTIOUS DISEASE FOLLOW UP NOTE:    Interval History/ROS: Patient is a 66y old  Male who presents with a chief complaint of Anemia, Supratherapeutic INR, Dark Stools       Overnight events:      REVIEW OF SYSTEMS:  CONSTITUTIONAL: No unintentional weight loss; no weakness; no fevers or chills  HEENT: No decrease hearing, tinnitus, ear pain, rhinorrhea, congestion, or sore throat  RESPIRATORY: No cough, wheezing, hemoptysis; no shortness of breath/shortness of breath on exertion; no orthopnea  CARDIOVASCULAR: No chest pain or palpitations  GASTROINTESTINAL: No abdominal or epigastric pain; no change in appetite; no early satiety; no nausea, vomiting, or hematemesis; no diarrhea or constipation; no melena or hematochezia; no change of stool caliber  GENITOURINARY: No dysuria, increased in urinary frequency or hematuria; no urethral discharge  NEUROLOGICAL: No headache/dizziness; no focal numbness or motor weakness  MSK: No joint pain, erythema, or swelling; no back pain  SKIN: No itching, rashes; no recent insect bites  All other ROS negative except noted above    Prior hospital charts reviewed [Yes]  Primary team notes reviewed [Yes]  Other consultant notes reviewed [Yes]    Allergies:  No Known Allergies      ANTIMICROBIALS:   cefepime   IVPB    cefepime   IVPB 1000 every 8 hours  vancomycin    Solution 125 daily      OTHER MEDS: MEDICATIONS  (STANDING):  acetaminophen    Suspension .. 650 every 6 hours PRN  acetaminophen  IVPB .. 1000 once  aMIOdarone    Tablet 200 daily  clonazePAM  Tablet 0.5 at bedtime  dexMEDEtomidine Infusion 1.5 <Continuous>  dextrose 50% Injectable 50 every 15 minutes  DOBUTamine Infusion 2.5 <Continuous>  insulin regular Infusion 3 <Continuous>  norepinephrine Infusion 0.05 <Continuous>  pantoprazole Infusion 8 <Continuous>  propofol Infusion 25 <Continuous>  simethicone 80 every 8 hours PRN  vasopressin Infusion 0.017 <Continuous>      Vital Signs Last 24 Hrs  T(F): 100.9 (21 @ 08:00), Max: 105.8 (21 @ 00:00)    Vital Signs Last 24 Hrs  HR: 108 (21 @ 13:00) (65 - 125)  BP: --  RR: 23 (21 @ 13:00)  SpO2: 100% (21 @ 13:00) (59% - 100%)  Wt(kg): --    EXAM:  GENERAL: NAD, lying in bed comfortably  HEAD: Atraumatic, Normocephalic  EYES: EOMI, PERRLA, conjunctiva pink and cornea white  ENT: Normal external ears and nose, no discharges; moist mucous membranes, no erythema on posterior oropharynx  NECK: Supple, nontender to palpation; no JVD  CHEST/LUNG: Clear to auscultation bilaterally; No rales, rhonchi, wheezing, or rubs. Unlabored respirations  HEART: Regular rate and rhythm; No murmurs, rubs, or gallops  ABDOMEN: Soft, nontender, nondistended; no rebound tenderness, no guarding; no hepatomegaly; normoactive/hyperactive/hypoactive bowel sounds  EXTREMITIES:  2+ Peripheral Pulses, brisk capillary refill. No clubbing, cyanosis, or petal edema  NERVOUS SYSTEM: Alert and oriented to person, time, place and situation, speech clear. No focal deficits   MSK: FROM all 4 extremities, full and equal strength; no joint erythema, swelling or pain  SKIN: No rashes or lesions, no superficial thrombophlebitis    Labs:                        9.0    15.62 )-----------( 176      ( 2021 04:13 )             28.0         129<L>  |  100  |  28<H>  ----------------------------<  251<H>  4.6   |  20<L>  |  1.06    Ca    8.5      2021 00:34  Phos  2.1       Mg     2.2         TPro  6.7  /  Alb  3.4  /  TBili  1.1  /  DBili  x   /  AST  68<H>  /  ALT  48<H>  /  AlkPhos  171<H>        WBC Trend:  WBC Count: 15.62 (21 @ 04:13)  WBC Count: 22.14 (21 @ 00:34)  WBC Count: 20.12 (21 @ 20:04)  WBC Count: 21.63 (21 @ 15:42)      Creatine Trend:  Creatinine, Serum: 1.06 ()  Creatinine, Serum: 0.76 ()  Creatinine, Serum: 0.70 ()  Creatinine, Serum: 0.89 ()      Liver Biochemical Testing Trend:  Alanine Aminotransferase (ALT/SGPT): 48 *H* ()  Alanine Aminotransferase (ALT/SGPT): 15 ()  Alanine Aminotransferase (ALT/SGPT): 16 ()  Alanine Aminotransferase (ALT/SGPT): 17 ()  Alanine Aminotransferase (ALT/SGPT): 17 ()  Aspartate Aminotransferase (AST/SGOT): 68 (21 @ 00:34)  Aspartate Aminotransferase (AST/SGOT): 18 (21 @ 05:58)  Aspartate Aminotransferase (AST/SGOT): 21 (21 @ 00:50)  Aspartate Aminotransferase (AST/SGOT): 22 (21 @ 06:11)  Aspartate Aminotransferase (AST/SGOT): 22 (21 @ 07:23)  Bilirubin Total, Serum: 1.1 ()  Bilirubin Total, Serum: 0.6 ()  Bilirubin Total, Serum: 0.5 ()  Bilirubin Total, Serum: 0.4 ()  Bilirubin Total, Serum: 0.6 ()      Trend LDH  21 @ 00:34  300<H>  21 @ 07:29  240  21 @ 05:58  242  21 @ 00:50  298<H>  21 @ 06:11  237      Urinalysis Basic - ( 2021 08:44 )    Color: Yellow / Appearance: Clear / S.034 / pH: x  Gluc: x / Ketone: Moderate  / Bili: Small / Urobili: Negative   Blood: x / Protein: 30 mg/dL / Nitrite: Negative   Leuk Esterase: Negative / RBC: 1 /hpf / WBC 2 /HPF   Sq Epi: x / Non Sq Epi: 0 /hpf / Bacteria: Negative        MICROBIOLOGY:    MRSA PCR Result.: Sruthi (21 @ 23:02)      Culture - Blood (collected 2021 22:59)  Source: .Blood Blood-Peripheral  Preliminary Report:    Growth in aerobic and anaerobic bottles: Gram Negative Rods    Culture - Blood (collected 2021 09:12)  Source: .Blood Blood-Peripheral  Preliminary Report:    Growth in aerobic and anaerobic bottles: Gram Negative Rods    ***Blood Panel PCR results on this specimen are available    approximately 3 hours after the Gram stain result.***    Gram stain, PCR, and/or culture results may not always    correspond due todifference in methodologies.    ************************************************************    This PCR assay was performed by multiplex PCR. This    Assay tests for 66 bacterial and resistance gene targets.    Please refer to the [facility] testdirectory    at [email or web address] for details.  Organism: Blood Culture PCR  Organism: Blood Culture PCR    Sensitivities:      -  Serratia marcescens: Detec      Method Type: PCR    Culture - Blood (collected 2021 22:13)  Source: .Blood Blood  Final Report:    No Growth Final    Culture - Blood (collected 2021 22:13)  Source: .Blood Blood  Final Report:    No Growth Final    Culture - Blood (collected 2021 17:39)  Source: .Blood Blood-Venous  Final Report:    No Growth Final    Culture - Sputum (collected 2021 11:47)  Source: .Sputum Sputum  Final Report:    Moderate Citrobacter freundii    Moderate Serratia marcescens    Normal Respiratory Bria present  Organism: Citrobacter freundii  Serratia marcescens  Organism: Serratia marcescens    Sensitivities:      -  Amikacin: S <=16      -  Amoxicillin/Clavulanic Acid: R >16/8      -  Ampicillin: R 16 These ampicillin results predict results for amoxicillin      -  Ampicillin/Sulbactam: R 16/8 Enterobacter, Citrobacter, and Serratia may develop resistance during prolonged therapy (3-4 days)      -  Aztreonam: S <=4      -  Cefazolin: R >16 Enterobacter, Citrobacter, and Serratia may develop resistance during prolonged therapy (3-4 days)      -  Cefepime: S <=2      -  Cefoxitin: R <=8      -  Ceftriaxone: S <=1 Enterobacter, Citrobacter, and Serratia may develop resistance during prolonged therapy      -  Ciprofloxacin: S <=0.25      -  Ertapenem: S <=0.5      -  Gentamicin: S <=2      -  Levofloxacin: S <=0.5      -  Meropenem: S <=1      -  Piperacillin/Tazobactam: S <=8      -  Tobramycin: S 4      -  Trimethoprim/Sulfamethoxazole: S <=0.5/9.5      Method Type: CLAU  Organism: Citrobacter freundii    Sensitivities:      -  Amikacin: S <=16      -  Amoxicillin/Clavulanic Acid: R >16/8      -  Ampicillin: R >16 These ampicillin results predict results for amoxicillin      -  Ampicillin/Sulbactam: R >16/8 Enterobacter, Citrobacter, and Serratia may develop resistance during prolonged therapy (3-4 days)      -  Aztreonam: I 16      -  Cefazolin: R >16 Enterobacter, Citrobacter, and Serratia may develop resistance during prolonged therapy (3-4 days)      -  Cefepime: S <=2      -  Cefoxitin: R >16      -  Ceftriaxone: R 32 Enterobacter, Citrobacter, and Serratia may develop resistance during prolonged therapy      -  Ciprofloxacin: S <=0.25      -  Ertapenem: S <=0.5      -  Gentamicin: S <=2      -  Imipenem: S <=1      -  Levofloxacin: S <=0.5      -  Meropenem: S <=1      -  Piperacillin/Tazobactam: S <=8      -  Tobramycin: S <=2      -  Trimethoprim/Sulfamethoxazole: S <=0.5/9.5      Method Type: CLAU    Culture - Sputum (collected 2021 20:57)  Source: .Sputum Sputum  Final Report:    Normal Respiratory Bria present    Culture - Blood (collected 2021 17:31)  Source: .Blood Blood-Peripheral  Final Report:    No Growth Final    Culture - Blood (collected 2021 17:31)  Source: .Blood Blood-Peripheral  Final Report:    No Growth Final    Culture - Blood (collected 2021 06:53)  Source: .Blood Blood-Peripheral  Final Report:    Growth in aerobic bottle: Staphylococcus lugdunensis  Organism: Staphylococcus lugdunensis  Organism: Staphylococcus lugdunensis    Sensitivities:      -  Ampicillin/Sulbactam: S <=8/4      -  Cefazolin: S <=4      -  Clindamycin: S <=0.25      -  Erythromycin: S <=0.25      -  Gentamicin: S <=1 Should not be used as monotherapy      -  Oxacillin: S <=0.25      -  Penicillin: R 4      -  RIF- Rifampin: S <=1 Should not be used as monotherapy      -  Tetra/Doxy: S <=1      -  Trimethoprim/Sulfamethoxazole: S <=0.5/9.5      -  Vancomycin: S 4      Method Type: CLAU      HIV-1/2 Combo Result: Nonreact (21 @ 08:18)    ABS CD4: 129 /uL (20 @ 08:38)    COVID-19 PCR: NotDetec (21 @ 12:24)    COVID-19 [place] Domain AB Interp: Positive (06-15-21 @ 10:15)  COVID-19 IgG Antibody Interpretation: Positive (21 @ 08:55)  COVID-19 IgG Antibody Interpretation: Positive (20 @ 08:50)            Lactate Dehydrogenase, Serum: 300 (-)  Lactate Dehydrogenase, Serum: 240 (-)  Lactate Dehydrogenase, Serum: 242 (-26)  Lactate Dehydrogenase, Serum: 298 (-26)  Lactate Dehydrogenase, Serum: 237 (-25)  Lactate Dehydrogenase, Serum: 260 (-24)  Lactate Dehydrogenase, Serum: 234 (-23)  Lactate Dehydrogenase, Serum: 263 (-22)  Lactate Dehydrogenase, Serum: 294 (07-21)        Blood Gas Arterial, Lactate: 1.1 ( @ 09:45)  Blood Gas Venous - Lactate: 1.4 ( @ 09:45)  Blood Gas Arterial, Lactate: 1.3 ( @ 08:12)  Blood Gas Arterial, Lactate: 0.8 ( @ 06:13)      RADIOLOGY:  imaging below personally reviewed    < from: Xray Chest 1 View- PORTABLE-Routine (Xray Chest 1 View- PORTABLE-Routine in AM.) (21 @ 02:22) >  FINDINGS:  2021 3:12 PM: Patient is status post sternotomy and valve replacement. A loop recorder is again seen overlying the chest.  A feeding tube is coiled at the GE junction and folded back into the distal esophagus. An LVAD is identified.  The heart is not well assessed on an AP film.  There is minimal pulmonary vascular congestion.  There are no pleural effusions.  There is no pneumothorax.    2021 5:42 PM: The feeding tube was repositioned into the body ofthe stomach. A right IJ catheter seen with its tip in the SVC. There is a large right pneumothorax with partial collapse of the right lung. There is mild pulmonary vascular congestion.    2021 7:44 PM: There is been placement of a right chest tube with interval reexpansion of the right lung. A trace right pneumothorax may remain. Pulmonary vascular congestion has improved.    2021 2:20 AM: There is a small amount subcutaneous emphysema. A trace right pneumothorax remains. Minimal pulmonary vascular congestion is again identified.    IMPRESSION:    Feeding tube repositioned in the stomach.    Placement of a right IJ line with right pneumothorax. A right chest tube was placed.    Mild pulmonary vascular congestion.    < end of copied text >     INFECTIOUS DISEASE FOLLOW UP NOTE:    Interval History/ROS: Patient is a 66y old  Male who presents with a chief complaint of Anemia, Supratherapeutic INR, Dark Stools       Overnight events: Patient remains febrile overnight, with increased pressors requirement. He is sedated and vented.       REVIEW OF SYSTEMS:  Unable to obtain as patient is currently sedated      Prior hospital charts reviewed [Yes]  Primary team notes reviewed [Yes]  Other consultant notes reviewed [Yes]    Allergies:  No Known Allergies      ANTIMICROBIALS:   cefepime   IVPB    cefepime   IVPB 1000 every 8 hours  vancomycin    Solution 125 daily    Antibiotics  IV Vanco 6/15,  -->  Zosyn 6/15 -->   PO Vanco 500 q 6  -->   PO Vanco 125 q 6h   IV Flagyl -->   Cefepime -->  Cefazolin --7  Vancomycin x1   Cefepime  -   Meropenem  -       OTHER MEDS: MEDICATIONS  (STANDING):  acetaminophen    Suspension .. 650 every 6 hours PRN  acetaminophen  IVPB .. 1000 once  aMIOdarone    Tablet 200 daily  clonazePAM  Tablet 0.5 at bedtime  dexMEDEtomidine Infusion 1.5 <Continuous>  dextrose 50% Injectable 50 every 15 minutes  DOBUTamine Infusion 2.5 <Continuous>  insulin regular Infusion 3 <Continuous>  norepinephrine Infusion 0.05 <Continuous>  pantoprazole Infusion 8 <Continuous>  propofol Infusion 25 <Continuous>  simethicone 80 every 8 hours PRN  vasopressin Infusion 0.017 <Continuous>      Vital Signs Last 24 Hrs  T(F): 100.9 (21 @ 08:00), Max: 105.8 (21 @ 00:00)    Vital Signs Last 24 Hrs  HR: 108 (21 @ 13:00) (65 - 125)  RR: 23 (21 @ 13:00)  SpO2: 100% (21 @ 13:00) (59% - 100%)      EXAM:  GENERAL: Sedated  HEAD: Atraumatic, Normocephalic  EYES: EOMI, PERRLA, conjunctiva pink and cornea white  ENT: Normal external ears and nose, no discharges; moist mucous membranes, no erythema on posterior oropharynx; no oral thrush  NECK: Supple, nontender to palpation; no JVD; trach in place, vented  CHEST/LUNG: Good air entry bilaterally; right chest tube in place  HEART: Regular rate and rhythm; LVAD mechanical sound heard on auscultation  ABDOMEN: Soft, nondistended; no rebound tenderness, no guarding; no hepatomegaly; normoactive bowel sounds; LVAD exit site looks clean and without erythema  EXTREMITIES: extremities cold to touch, 2+ Peripheral Pulses, brisk capillary refill. No clubbing, cyanosis, or petal edema  NERVOUS SYSTEM: Sedated  MSK: Sedated; no joint erythema, swelling or pain  SKIN: No superficial thrombophlebitis; RIJ central line site clean    Labs:                        9.0    15.62 )-----------( 176      ( 2021 04:13 )             28.0         129<L>  |  100  |  28<H>  ----------------------------<  251<H>  4.6   |  20<L>  |  1.06    Ca    8.5      2021 00:34  Phos  2.1       Mg     2.2         TPro  6.7  /  Alb  3.4  /  TBili  1.1  /  DBili  x   /  AST  68<H>  /  ALT  48<H>  /  AlkPhos  171<H>        WBC Trend:  WBC Count: 15.62 (21 @ 04:13)  WBC Count: 22.14 (21 @ 00:34)  WBC Count: 20.12 (21 @ 20:04)  WBC Count: 21.63 (21 @ 15:42)      Creatine Trend:  Creatinine, Serum: 1.06 ()  Creatinine, Serum: 0.76 ()  Creatinine, Serum: 0.70 ()  Creatinine, Serum: 0.89 ()      Liver Biochemical Testing Trend:  Alanine Aminotransferase (ALT/SGPT): 48 *H* ()  Alanine Aminotransferase (ALT/SGPT): 15 ()  Alanine Aminotransferase (ALT/SGPT): 16 ()  Alanine Aminotransferase (ALT/SGPT): 17 ()  Alanine Aminotransferase (ALT/SGPT): 17 ()  Aspartate Aminotransferase (AST/SGOT): 68 (21 @ 00:34)  Aspartate Aminotransferase (AST/SGOT): 18 (21 @ 05:58)  Aspartate Aminotransferase (AST/SGOT): 21 (21 @ 00:50)  Aspartate Aminotransferase (AST/SGOT): 22 (21 @ 06:11)  Aspartate Aminotransferase (AST/SGOT): 22 (21 @ 07:23)  Bilirubin Total, Serum: 1.1 ()  Bilirubin Total, Serum: 0.6 ()  Bilirubin Total, Serum: 0.5 ()  Bilirubin Total, Serum: 0.4 ()  Bilirubin Total, Serum: 0.6 ()      Trend LDH  21 @ 00:34  300<H>  21 @ 07:29  240  21 @ 05:58  242  21 @ 00:50  298<H>  21 @ 06:11  237      Urinalysis Basic - ( 2021 08:44 )    Color: Yellow / Appearance: Clear / S.034 / pH: x  Gluc: x / Ketone: Moderate  / Bili: Small / Urobili: Negative   Blood: x / Protein: 30 mg/dL / Nitrite: Negative   Leuk Esterase: Negative / RBC: 1 /hpf / WBC 2 /HPF   Sq Epi: x / Non Sq Epi: 0 /hpf / Bacteria: Negative        MICROBIOLOGY:    MRSA PCR Result.: Madhu (21 @ 23:02)      Culture - Blood (collected 2021 22:59)  Source: .Blood Blood-Peripheral  Preliminary Report:    Growth in aerobic and anaerobic bottles: Gram Negative Rods    Culture - Blood (collected 2021 09:12)  Source: .Blood Blood-Peripheral  Preliminary Report:    Growth in aerobic and anaerobic bottles: Gram Negative Rods    ***Blood Panel PCR results on this specimen are available    approximately 3 hours after the Gram stain result.***    Gram stain, PCR, and/or culture results may not always    correspond due todifference in methodologies.    ************************************************************    This PCR assay was performed by multiplex PCR. This    Assay tests for 66 bacterial and resistance gene targets.    Please refer to the [facility] testdirectory    at [email or web address] for details.  Organism: Blood Culture PCR  Organism: Blood Culture PCR    Sensitivities:      -  Serratia marcescens: Detec      Method Type: PCR    Culture - Blood (collected 2021 22:13)  Source: .Blood Blood  Final Report:    No Growth Final    Culture - Blood (collected 2021 22:13)  Source: .Blood Blood  Final Report:    No Growth Final    Culture - Blood (collected 2021 17:39)  Source: .Blood Blood-Venous  Final Report:    No Growth Final    Culture - Sputum (collected 2021 11:47)  Source: .Sputum Sputum  Final Report:    Moderate Citrobacter freundii    Moderate Serratia marcescens    Normal Respiratory Bria present  Organism: Citrobacter freundii  Serratia marcescens  Organism: Serratia marcescens    Sensitivities:      -  Amikacin: S <=16      -  Amoxicillin/Clavulanic Acid: R >16/8      -  Ampicillin: R 16 These ampicillin results predict results for amoxicillin      -  Ampicillin/Sulbactam: R 16/8 Enterobacter, Citrobacter, and Serratia may develop resistance during prolonged therapy (3-4 days)      -  Aztreonam: S <=4      -  Cefazolin: R >16 Enterobacter, Citrobacter, and Serratia may develop resistance during prolonged therapy (3-4 days)      -  Cefepime: S <=2      -  Cefoxitin: R <=8      -  Ceftriaxone: S <=1 Enterobacter, Citrobacter, and Serratia may develop resistance during prolonged therapy      -  Ciprofloxacin: S <=0.25      -  Ertapenem: S <=0.5      -  Gentamicin: S <=2      -  Levofloxacin: S <=0.5      -  Meropenem: S <=1      -  Piperacillin/Tazobactam: S <=8      -  Tobramycin: S 4      -  Trimethoprim/Sulfamethoxazole: S <=0.5/9.5      Method Type: CLAU  Organism: Citrobacter freundii    Sensitivities:      -  Amikacin: S <=16      -  Amoxicillin/Clavulanic Acid: R >16/8      -  Ampicillin: R >16 These ampicillin results predict results for amoxicillin      -  Ampicillin/Sulbactam: R >16/8 Enterobacter, Citrobacter, and Serratia may develop resistance during prolonged therapy (3-4 days)      -  Aztreonam: I 16      -  Cefazolin: R >16 Enterobacter, Citrobacter, and Serratia may develop resistance during prolonged therapy (3-4 days)      -  Cefepime: S <=2      -  Cefoxitin: R >16      -  Ceftriaxone: R 32 Enterobacter, Citrobacter, and Serratia may develop resistance during prolonged therapy      -  Ciprofloxacin: S <=0.25      -  Ertapenem: S <=0.5      -  Gentamicin: S <=2      -  Imipenem: S <=1      -  Levofloxacin: S <=0.5      -  Meropenem: S <=1      -  Piperacillin/Tazobactam: S <=8      -  Tobramycin: S <=2      -  Trimethoprim/Sulfamethoxazole: S <=0.5/9.5      Method Type: CLAU    Culture - Sputum (collected 2021 20:57)  Source: .Sputum Sputum  Final Report:    Normal Respiratory Bria present    Culture - Blood (collected 2021 17:31)  Source: .Blood Blood-Peripheral  Final Report:    No Growth Final    Culture - Blood (collected 2021 17:31)  Source: .Blood Blood-Peripheral  Final Report:    No Growth Final    Culture - Blood (collected 2021 06:53)  Source: .Blood Blood-Peripheral  Final Report:    Growth in aerobic bottle: Staphylococcus lugdunensis  Organism: Staphylococcus lugdunensis  Organism: Staphylococcus lugdunensis    Sensitivities:      -  Ampicillin/Sulbactam: S <=8/4      -  Cefazolin: S <=4      -  Clindamycin: S <=0.25      -  Erythromycin: S <=0.25      -  Gentamicin: S <=1 Should not be used as monotherapy      -  Oxacillin: S <=0.25      -  Penicillin: R 4      -  RIF- Rifampin: S <=1 Should not be used as monotherapy      -  Tetra/Doxy: S <=1      -  Trimethoprim/Sulfamethoxazole: S <=0.5/9.5      -  Vancomycin: S 4      Method Type: CLAU      HIV-1/2 Combo Result: Nonreact (21 @ 08:18)    ABS CD4: 129 /uL (04-07-20 @ 08:38)    COVID-19 PCR: NotDetec (21 @ 12:24)    COVID-19 eLón Domain AB Interp: Positive (06-15-21 @ 10:15)  COVID-19 IgG Antibody Interpretation: Positive (21 @ 08:55)  COVID-19 IgG Antibody Interpretation: Positive (20 @ 08:50)            Lactate Dehydrogenase, Serum: 300 (-)  Lactate Dehydrogenase, Serum: 240 ()  Lactate Dehydrogenase, Serum: 242 (-)  Lactate Dehydrogenase, Serum: 298 (-)  Lactate Dehydrogenase, Serum: 237 (-25)  Lactate Dehydrogenase, Serum: 260 (-24)  Lactate Dehydrogenase, Serum: 234 (-23)  Lactate Dehydrogenase, Serum: 263 (-22)  Lactate Dehydrogenase, Serum: 294 (-)        Blood Gas Arterial, Lactate: 1.1 ( @ 09:45)  Blood Gas Venous - Lactate: 1.4 ( @ 09:45)  Blood Gas Arterial, Lactate: 1.3 ( @ 08:12)  Blood Gas Arterial, Lactate: 0.8 ( @ 06:13)      RADIOLOGY:  imaging below personally reviewed    < from: Xray Chest 1 View- PORTABLE-Routine (Xray Chest 1 View- PORTABLE-Routine in AM.) (21 @ 02:22) >  FINDINGS:  2021 3:12 PM: Patient is status post sternotomy and valve replacement. A loop recorder is again seen overlying the chest.  A feeding tube is coiled at the GE junction and folded back into the distal esophagus. An LVAD is identified.  The heart is not well assessed on an AP film.  There is minimal pulmonary vascular congestion.  There are no pleural effusions.  There is no pneumothorax.    2021 5:42 PM: The feeding tube was repositioned into the body ofthe stomach. A right IJ catheter seen with its tip in the SVC. There is a large right pneumothorax with partial collapse of the right lung. There is mild pulmonary vascular congestion.    2021 7:44 PM: There is been placement of a right chest tube with interval reexpansion of the right lung. A trace right pneumothorax may remain. Pulmonary vascular congestion has improved.    2021 2:20 AM: There is a small amount subcutaneous emphysema. A trace right pneumothorax remains. Minimal pulmonary vascular congestion is again identified.    IMPRESSION:    Feeding tube repositioned in the stomach.    Placement of a right IJ line with right pneumothorax. A right chest tube was placed.    Mild pulmonary vascular congestion.    < end of copied text >

## 2021-07-27 NOTE — PROGRESS NOTE ADULT - PROBLEM SELECTOR PLAN 3
- Cx pos for serratia, now on cefepime  - remains on levo and vaso, wean as tolerated  - significant net pos in s/o fluid repletion, avoid standing fluids, small bolus prn, high risk of subsequent volume overload  - repeat blood Cxs   - consider additional imaging, RUQ US, CTAP without constrast

## 2021-07-27 NOTE — PROGRESS NOTE ADULT - SUBJECTIVE AND OBJECTIVE BOX
RICKY JOINT  MRN#: [id number]  Subjective:  Pulmonary progress  : recurrent Acute hypoxic respiratory Failure ,aspiration pneumonia, NICM  , chart reviewed and H/O obtained radiological and Laboratory study reviewed patient Examined     65M PMH ACC/AHA stage D HF due to NICM HM2 LVAD , TV annuloplasty ring 17 as destination therapy due to severe peripheral artery disease with significant stenosis  SIADH, Depression, CKD-3 with hyperkalemia, past E. coli UTIs, driveline drainage (21) and COVID-19 (back in 2020)  He was recently seen in clinic where he complained of abdominal pain and dark stools w constipation back in May. He presents to [facility] ER today weakness and fatigue, moderate and + Black stools for three days, on coumadin secondary to warfarin use in the setting of an LVAD. Patient has required transfusions for GIB in the past. Mostly recently back in 2021 pt had anemia with dark stools. No interventions was done at that time. However Last Endoscopy was done in 2020 (negative). Today labs show patient is anemic with H/H of 4.5/16.3,. INR is 8.84 MAP in the 90s, Temp 35.1. He denies any chest pain, shortness of breath, dizziness, abd pain, nausea or vomiting. found to have  rectal bleeding underwent endoscopy ,old blood in the proximal ileum ,  develop sepsis with LL opacity given Antibiotics , Extubated , reintubated , Bronchoscopy on Zosyn for LL pneumonia  and Amiodarone S/P TV Annuloplasty , patient remain intubated on full ventilatory support .S/P multiple units of blood transfusion , remain on full ventilatory support on Precedex and propofol , new central IJ line , diarrhea C diff. +ve on po vancomycin and IV Flagyl,  mildly distended belly , fever start on cefepime 2gm q 8 hrs S/P tracheostomy .  new RT Subclavian central line continue on contact  isolation ,still diarrhea on C-diff antibiotics ENT follow up appreciated , trial of C-PAP as tolerated , , copious secretion from trach. site chest x ray left lower lobe pneumonia , tolerating trch. collar 50% FI02 still excessive secretion need pulmonary toilet , off Ancef antibiotic , no more diarrhea back on full support mechanical ventilator , chest x ray show improvement in LLL air space disease, more awake and responsive on tube feeding no more diarrhea , S/P  Ancef for bacteremia no fever ,ID follow up noted ,  no nausea or vomiting or diarrhea still very weak and tired , event note pulled NG tube now replaced , back on tube feeding ,still on po vancomycin , getting PT and OT at bed side , no plan for decannulation for now. , no more diarrhea receiving PT and OPT at bed side , minimal secretion from tracheostomy site , no SOB getting stronger , improve muscle tone patient transfer to monitor bed still on contact isolation for C-Difficel colitis on 50% FI02, NG tube clogged , and change to resume tube feeding still loose stool . H/H drop significantly require blood transfusion , most likely GI bleeding , IV heparin D/C , vomiting 200 cc of creamy color tube feeding on hold no sob, still melena monitor in the CTU possible capsule endoscopy , H/H is stable ., patient develop TR sided  pneumothorax require chest tube placement , RT IJ central line  placed , develop fever shaking chills , blood culture positive for serratia marcescens , start on cefepime .the patient  become hypoxic and hypotensive placed on full ventilatory support and Vasopressin , levophed and Dobutamine ,S/P blood transfusion          (2021 16:57)    PAST MEDICAL & SURGICAL HISTORY:  CHF (congestive heart failure)    CAD (coronary artery disease)  Depression    Pleural effusion    History of 2019 novel coronavirus disease (COVID-19)  2020    Hemorrhoids    Bleeding hemorrhoids    Peripheral arterial disease    Claudication    BPH with urinary obstruction    ACC/AHA stage D systolic heart failure  Anticoagulation goal of INR 2.0 to 2.5    Falls    Clavicle fracture    CKD (chronic kidney disease), stage III    Iron deficiency anemia    H/O epistaxis    Vertigo    GI bleed    S/P TVR (tricuspid valve repair)    S/P ventricular assist device    S/P endoscopy    OBJECTIVE:  ICU Vital Signs Last 24 Hrs  T(C): 38.2 (2021 10:00), Max: 38.5 (2021 12:00)  T(F): 100.8 (2021 10:00), Max: 101.3 (2021 12:00)  HR: 65 (2021 10:00) (61 - 69)  BP: --  BP(mean): --  ABP: 105/67 (2021 10:00) (90/54 - 113/64)  ABP(mean): 77 (2021 10:00) (63 - 77)  RR: 20 (2021 10:00) (19 - 35)  SpO2: 99% (2021 10:00) (96% - 100%)       @ 07:  -   @ 07:00  --------------------------------------------------------  IN: 2693.9 mL / OUT: 1415 mL / NET: 1278.9 mL     @ 07: @ 10:49  --------------------------------------------------------  IN: 420.8 mL / OUT: 115 mL / NET: 305.8 mL  PHYSICAL EXAM:Daily   Elderly male S/P tracheostomy   on  full ventilatory support on  50% FI02 on vasopressin , Dobutamine , levophed   Daily Weight in k.4 (2021 00:00)  HEENT:     + NCAT  + EOMI  - Conjuctival edema   - Icterus   - Thrush   - ETT  + NGT/OGT  Neck:         + FROM   RT IJ line  JVD     - Nodes     - Masses    + Mid-line trachea + Tracheostomy  Chest:            RT pig tail cathter in place   Lungs:          + CTA   + Rhonchi    - Rales    - Wheezing + Decreased  LT BS   - Dullness R L  Cardiac:       + S1 + S2    + RRR   - Irregular   - S3  - S4    - Murmurs   - Rub   - Hamman’s sign   Abdomen:    + BS     + Soft    + Non-tender     - Distended    - Organomegaly  - PEG  Extremities:   - Cyanosis U/L   - Clubbing  U/L  + LE/UE Edema   + Capillary refill    + Pulses   Neuro:        - Awake   -  Alert   - Confused   - Lethargic   + Sedated  + Generalized Weakness  Skin:        - Rashes    - Erythema   + Normal incisions   + IV sites intact          HOSPITAL MEDICATIONS: All mediciations reviewed and analyzed  MEDICATIONS  (STANDING):  aMIOdarone    Tablet 200 milliGRAM(s) Oral daily  chlorhexidine 0.12% Liquid 15 milliLiter(s) Oral Mucosa every 12 hours  chlorhexidine 2% Cloths 1 Application(s) Topical <User Schedule>  dexMEDEtomidine Infusion 0.5 MICROgram(s)/kG/Hr (9.81 mL/Hr) IV Continuous <Continuous>  dextrose 50% Injectable 50 milliLiter(s) IV Push every 15 minutes  heparin  Infusion 400 Unit(s)/Hr (12.5 mL/Hr) IV Continuous <Continuous>  HYDROmorphone  Injectable 0.5 milliGRAM(s) IV Push once  insulin lispro (ADMELOG) corrective regimen sliding scale   SubCutaneous every 6 hours  pantoprazole  Injectable 40 milliGRAM(s) IV Push every 12 hours  piperacillin/tazobactam IVPB.. 3.375 Gram(s) IV Intermittent every 8 hours  propofol Infusion 20 MICROgram(s)/kG/Min (9.42 mL/Hr) IV Continuous <Continuous>  sodium chloride 0.9% lock flush 3 milliLiter(s) IV Push every 8 hours  sodium chloride 0.9%. 1000 milliLiter(s) (10 mL/Hr) IV Continuous <Continuous>    MEDICATIONS  (PRN):  acetaminophen    Suspension .. 650 milliGRAM(s) Oral every 6 hours PRN Temp greater or equal to 38C (100.4F)    LABS: All Lab data reviewed and analyzed                        9.0    15.62 )-----------( 176      ( 2021 04:13 )             28.0    07-27    129<L>  |  100  |  28<H>  ----------------------------<  251<H>  4.6   |  20<L>  |  1.06    Ca    8.5      2021 00:34  Phos  2.1     07-27  Mg     2.2     07-27    TPro  6.7  /  Alb  3.4  /  TBili  1.1  /  DBili  x   /  AST  68<H>  /  ALT  48<H>  /  AlkPhos  171<H>  07-    Ca    9.5      2021 06:11  Phos  2.9     07-24  Mg     2.0     07-24    TPro  7.5  /  Alb  3.8  /  TBili  0.4  /  DBili  x   /  AST  22  /  ALT  17  /  AlkPhos  72  07-25      Ca    9.5      2021 07:23  Phos  2.9     07-24  Mg     2.0     07-24    TPro  7.7  /  Alb  3.8  /  TBili  0.6  /  DBili  x   /  AST  22  /  ALT  17  /  AlkPhos  76  07-24                                                      PTT - ( 2021 04:52 )  PTT:45.2 sec LIVER FUNCTIONS - ( 2021 00:42 )  Alb: 3.4 g/dL / Pro: 6.7 g/dL / ALK PHOS: 213 U/L / ALT: 15 U/L / AST: 24 U/L / GGT: x           RADIOLOGY: - Reviewed and analyzed RT Pig tail cathter  , LVAD HM2, CT scan of abdomen reviewed result noted

## 2021-07-28 NOTE — PROGRESS NOTE ADULT - SUBJECTIVE AND OBJECTIVE BOX
All Cardiology service information can be found  on [email or web address], password: [email or web address]    Interval History: No acute events overnight, remains clinically unchanged    Medications:  acetaminophen    Suspension .. 650 milliGRAM(s) Oral every 6 hours PRN  aMIOdarone    Tablet 200 milliGRAM(s) Oral daily  chlorhexidine 0.12% Liquid 15 milliLiter(s) Oral Mucosa every 12 hours  chlorhexidine 2% Cloths 1 Application(s) Topical <User Schedule>  clonazePAM  Tablet 0.5 milliGRAM(s) Oral at bedtime  dexMEDEtomidine Infusion 1.5 MICROgram(s)/kG/Hr IV Continuous <Continuous>  DOBUTamine Infusion 2.5 MICROgram(s)/kG/Min IV Continuous <Continuous>  insulin regular Infusion 3 Unit(s)/Hr IV Continuous <Continuous>  meropenem  IVPB 1000 milliGRAM(s) IV Intermittent every 8 hours  meropenem  IVPB      norepinephrine Infusion 0.05 MICROgram(s)/kG/Min IV Continuous <Continuous>  pantoprazole Infusion 8 mG/Hr IV Continuous <Continuous>  propofol Infusion 25 MICROgram(s)/kG/Min IV Continuous <Continuous>  simethicone 80 milliGRAM(s) Chew every 8 hours PRN  sodium chloride 0.9%. 1000 milliLiter(s) IV Continuous <Continuous>  vancomycin    Solution 125 milliGRAM(s) Oral daily  vasopressin Infusion 0.017 Unit(s)/Min IV Continuous <Continuous>      Vitals:  T(C): 36.4 (21 @ 08:00), Max: 38.7 (21 @ 00:30)  HR: 97 (21 @ 10:15) (0 - 114)  BP: --  BP(mean): --  ABP: 87/73 (21 @ 10:15) (74/59 - 119/83)  ABP(mean): 79 (21 @ 10:15) (64 - 94)  RR: 26 (21 @ 10:15) (17 - 71)  SpO2: 100% (21 @ 10:15) (73% - 100%)    Daily     Daily Weight in k (2021 00:00)    I&O's Summary    2021 07:01  -  2021 07:00  --------------------------------------------------------  IN: 4333.9 mL / OUT: 905 mL / NET: 3428.9 mL    2021 07:01  -  2021 12:02  --------------------------------------------------------  IN: 444.4 mL / OUT: 110 mL / NET: 334.4 mL      Physical Exam:  Appearance: No Acute Distress, intubated sedated   Neck: minimal JVP   Cardiovascular: Normal S1 S2, No murmurs/rubs/gallops  Respiratory: Clear to auscultation bilaterally  Gastrointestinal: Soft, Non-tender	  Skin: No cyanosis	  Neurologic: Non-focal  Extremities: No LE edema  Psychiatry: A & O x 3, Mood & affect appropriate    LVAD Interrogation:  Pump Speed: 9200  Pump Flow: 5.3  Pulse Index: 5.6  Pump Power: 5.6  VAD Events: No significant events   Driveline evaluation: c/d/i  Programming Changes: No changes made    Labs:                        8.6    17.87 )-----------( 130      ( 2021 00:32 )             26.3     -28    129<L>  |  99  |  22  ----------------------------<  149<H>  4.0   |  19<L>  |  0.55    Ca    8.6      2021 00:32  Phos  1.9       Mg     1.8         TPro  6.2  /  Alb  3.1<L>  /  TBili  1.7<H>  /  DBili  x   /  AST  195<H>  /  ALT  157<H>  /  AlkPhos  131<H>      PT/INR - ( 2021 14:23 )   PT: 25.8 sec;   INR: 2.23 ratio

## 2021-07-28 NOTE — PROGRESS NOTE ADULT - SUBJECTIVE AND OBJECTIVE BOX
Patient is a 66y old  Male who presents with a chief complaint of Anemia, Supratherapeutic INR, Dark Stools (2021 22:25)      Interval Events:   No melena overnight.  Patient mechanically ventilated, sedated, on multiple pressors.  Unable to provide ROS.      Hospital Medications:  acetaminophen    Suspension .. 650 milliGRAM(s) Oral every 6 hours PRN  aMIOdarone    Tablet 200 milliGRAM(s) Oral daily  chlorhexidine 0.12% Liquid 15 milliLiter(s) Oral Mucosa every 12 hours  chlorhexidine 2% Cloths 1 Application(s) Topical <User Schedule>  clonazePAM  Tablet 0.5 milliGRAM(s) Oral at bedtime  dexMEDEtomidine Infusion 1.5 MICROgram(s)/kG/Hr IV Continuous <Continuous>  DOBUTamine Infusion 2.5 MICROgram(s)/kG/Min IV Continuous <Continuous>  furosemide   Injectable 10 milliGRAM(s) IV Push once  insulin regular Infusion 3 Unit(s)/Hr IV Continuous <Continuous>  meropenem  IVPB 1000 milliGRAM(s) IV Intermittent every 8 hours  meropenem  IVPB      norepinephrine Infusion 0.05 MICROgram(s)/kG/Min IV Continuous <Continuous>  pantoprazole Infusion 8 mG/Hr IV Continuous <Continuous>  propofol Infusion 25 MICROgram(s)/kG/Min IV Continuous <Continuous>  simethicone 80 milliGRAM(s) Chew every 8 hours PRN  sodium chloride 0.9%. 1000 milliLiter(s) IV Continuous <Continuous>  vancomycin    Solution 125 milliGRAM(s) Oral daily  vasopressin Infusion 0.017 Unit(s)/Min IV Continuous <Continuous>      PHYSICAL EXAM:   Vital Signs:  Vital Signs Last 24 Hrs  T(C): 36.4 (2021 08:00), Max: 38.7 (2021 00:30)  T(F): 97.5 (2021 08:00), Max: 101.7 (2021 00:30)  HR: 93 (2021 09:09) (0 - 122)  BP: --  BP(mean): --  RR: 22 (2021 09:00) (17 - 71)  SpO2: 100% (2021 09:09) (73% - 100%)  Daily     Daily Weight in k (2021 00:00)    GENERAL: sedated on ventilator  NEURO: not alert/oriented  HEENT: anicteric sclera, no conjunctival pallor appreciated  CHEST: mechanically ventilated, no accessory muscle use  CARDIAC: regular rate, +S1/S2  ABDOMEN: soft, nondistended, nontender, no rebound or guarding  EXTREMITIES: warm, well perfused, no edema  SKIN: no lesions noted    LABS: reviewed                        8.6    17.87 )-----------( 130      ( 2021 00:32 )             26.3         129<L>  |  99  |  22  ----------------------------<  149<H>  4.0   |  19<L>  |  0.55    Ca    8.6      2021 00:32  Phos  1.9       Mg     1.8         TPro  6.2  /  Alb  3.1<L>  /  TBili  1.7<H>  /  DBili  x   /  AST  195<H>  /  ALT  157<H>  /  AlkPhos  131<H>      LIVER FUNCTIONS - ( 2021 00:32 )  Alb: 3.1 g/dL / Pro: 6.2 g/dL / ALK PHOS: 131 U/L / ALT: 157 U/L / AST: 195 U/L / GGT: x             Interval Diagnostic Studies: see sunrise for full report

## 2021-07-28 NOTE — PROGRESS NOTE ADULT - ATTENDING COMMENTS
Patient seen and examined and discussed with Dr Hall    I agree with his interval history exam and plans as noted above    Patient clinically improving more alert today, decreasing pressor requirements  Plan to undergo CT of chest/abdomen/pelvis to isolate source of serratia bacteremia high grade  Repeat blood cultures sent and pending to look for clearing  Can continue Meropenem for now  Would increase oral Vancomycin dosing to bid  IF CT is unrevealing for a source will pursue TTE    Morris Prater MD  [phone]  After 5pm/weekends [phone]

## 2021-07-28 NOTE — PROGRESS NOTE ADULT - ASSESSMENT
Assessment and Recommendation:   · Assessment	  Assessment and recommendation :  Recurrent Acute hypoxic respiratory Failure S/P tracheostomy on full ventilatory  at 50% Fi02, /16/5 PEEP   Acute blood loss anemia S/P multiple  blood transfusion   septic shock on vasopressin , levophed and Dobutamine   sepsis with serratia marcescens in the blood on meropenem and vancomycin   RT pneumothorax Pig tail cathter in place   recurrent rectal bleeding capsule endoscopy   S/P Acute left lower lobe pneumonia   Aspiration pneumonia   Stool is  +ve for C-diff. colitis on PO vancomycin  improved   S/P  Ancef improved for bacteremia   Non ischemic cardiomyopathy continue ACE inhibitor and B-Blockers   S/P Septic shock and cardiogenic shock   Stage D systolic heart failure S/P LVAD HM2   MH2 LVAD  with  TV Annuloplasty  Severe peripheral vascular disease   S/P Anion gap metabolic acidosis  severe hyperglycemia on insulin coverage    On  Amiodarone   critical care polyneuropathy   Anemia of Acute blood Loss   S/P Small bowel bleeding   S/P blood and FFP transfusion   Chronic kidney disease stage III   NGT feeding on Jevity   PT and OT at bed side   GI prophylaxis with PPI   critical care time is 40 minutes

## 2021-07-28 NOTE — PROGRESS NOTE ADULT - SUBJECTIVE AND OBJECTIVE BOX
INFECTIOUS DISEASE FOLLOW UP NOTE:    Interval History/ROS: Patient is a 66y old  Male who presents with a chief complaint of Anemia, Supratherapeutic INR, Dark Stools (27 Jul 2021 22:25)      Overnight events: Patient was febrile overnight with Tmax 101.7. Defervesce in AM. Still sedated and vented.        REVIEW OF SYSTEMS:  Unable to obtain as patient is sedated      Prior hospital charts reviewed [Yes]  Primary team notes reviewed [Yes]  Other consultant notes reviewed [Yes]    Allergies:  No Known Allergies      ANTIMICROBIALS:   meropenem  IVPB 1000 every 8 hours  meropenem  IVPB    vancomycin    Solution 125 daily      OTHER MEDS: MEDICATIONS  (STANDING):  acetaminophen    Suspension .. 650 every 6 hours PRN  aMIOdarone    Tablet 200 daily  clonazePAM  Tablet 0.5 at bedtime  dexMEDEtomidine Infusion 1.5 <Continuous>  DOBUTamine Infusion 2.5 <Continuous>  furosemide   Injectable 10 once  insulin regular Infusion 3 <Continuous>  norepinephrine Infusion 0.05 <Continuous>  pantoprazole Infusion 8 <Continuous>  propofol Infusion 25 <Continuous>  simethicone 80 every 8 hours PRN  vasopressin Infusion 0.017 <Continuous>      Vital Signs Last 24 Hrs  T(F): 97.5 (07-28-21 @ 08:00), Max: 105.8 (07-27-21 @ 00:00)    Vital Signs Last 24 Hrs  HR: 93 (07-28-21 @ 09:09) (0 - 122)  RR: 22 (07-28-21 @ 09:00)  SpO2: 100% (07-28-21 @ 09:09) (73% - 100%)      EXAM:  GENERAL: Sedated  HEAD: Atraumatic, Normocephalic  EYES: EOMI, PERRLA, conjunctiva pink and cornea white  ENT: Normal external ears and nose, no discharges; moist mucous membranes, no erythema on posterior oropharynx; no oral thrush  NECK: Supple, nontender to palpation; no JVD; trach in place, vented  CHEST/LUNG: Good air entry bilaterally; right chest tube in place  HEART: Regular rate and rhythm; LVAD mechanical sound heard on auscultation  ABDOMEN: Soft, nondistended; no rebound tenderness, no guarding; no hepatomegaly; normoactive bowel sounds; LVAD exit site looks clean and without erythema  EXTREMITIES: extremities cold to touch, 2+ Peripheral Pulses, brisk capillary refill. No clubbing, cyanosis, or petal edema  NERVOUS SYSTEM: Sedated  MSK: Sedated; no joint erythema, swelling or pain  SKIN: No superficial thrombophlebitis; RIJ central line site clean    Labs:                        8.6    17.87 )-----------( 130      ( 28 Jul 2021 00:32 )             26.3     07-28    129<L>  |  99  |  22  ----------------------------<  149<H>  4.0   |  19<L>  |  0.55    Ca    8.6      28 Jul 2021 00:32  Phos  1.9     07-28  Mg     1.8     07-28    TPro  6.2  /  Alb  3.1<L>  /  TBili  1.7<H>  /  DBili  x   /  AST  195<H>  /  ALT  157<H>  /  AlkPhos  131<H>  07-28      WBC Trend:  WBC Count: 17.87 (07-28-21 @ 00:32)  WBC Count: 15.62 (07-27-21 @ 04:13)  WBC Count: 22.14 (07-27-21 @ 00:34)  WBC Count: 20.12 (07-26-21 @ 20:04)      Creatine Trend:  Creatinine, Serum: 0.55 (07-28)  Creatinine, Serum: 1.06 (07-27)  Creatinine, Serum: 0.76 (07-26)  Creatinine, Serum: 0.70 (07-26)      Liver Biochemical Testing Trend:  Alanine Aminotransferase (ALT/SGPT): 157 *H* (07-28)  Alanine Aminotransferase (ALT/SGPT): 48 *H* (07-27)  Alanine Aminotransferase (ALT/SGPT): 15 (07-26)  Alanine Aminotransferase (ALT/SGPT): 16 (07-26)  Alanine Aminotransferase (ALT/SGPT): 17 (07-25)  Aspartate Aminotransferase (AST/SGOT): 195 (07-28-21 @ 00:32)  Aspartate Aminotransferase (AST/SGOT): 68 (07-27-21 @ 00:34)  Aspartate Aminotransferase (AST/SGOT): 18 (07-26-21 @ 05:58)  Aspartate Aminotransferase (AST/SGOT): 21 (07-26-21 @ 00:50)  Aspartate Aminotransferase (AST/SGOT): 22 (07-25-21 @ 06:11)  Bilirubin Total, Serum: 1.7 (07-28)  Bilirubin Total, Serum: 1.1 (07-27)  Bilirubin Total, Serum: 0.6 (07-26)  Bilirubin Total, Serum: 0.5 (07-26)  Bilirubin Total, Serum: 0.4 (07-25)      Trend LDH  07-28-21 @ 00:32  430<H>  07-27-21 @ 00:34  300<H>  07-26-21 @ 07:29  240  07-26-21 @ 05:58  242  07-26-21 @ 00:50  298<H>          MICROBIOLOGY:    MRSA PCR Result.: NotDetec (06-14-21 @ 23:02)      Culture - Sputum (collected 27 Jul 2021 19:08)  Source: .Sputum Sputum    Culture - Blood (collected 26 Jul 2021 22:59)  Source: .Blood Blood-Peripheral  Preliminary Report:    Growth in aerobic and anaerobic bottles: Gram Negative Rods    Culture - Blood (collected 26 Jul 2021 09:12)  Source: .Blood Blood-Peripheral  Preliminary Report:    Growth in aerobic and anaerobic bottles: Serratia marcescens    ***Blood Panel PCR results on this specimen are available    approximately 3 hours after the Gram stain result.***    Gram stain, PCR, and/or culture results may not always    correspond due to difference in methodologies.    ************************************************************    This PCR assay was performed by multiplex PCR. This    Assay tests for 66 bacterial and resistance gene targets.    Please refer to the [facility] test directory    at [email or web address] for details.  Organism: Blood Culture PCR  Organism: Blood Culture PCR    Sensitivities:      -  Serratia marcescens: Detec      Method Type: PCR    Culture - Blood (collected 14 Jul 2021 22:13)  Source: .Blood Blood  Final Report:    No Growth Final    Culture - Blood (collected 14 Jul 2021 22:13)  Source: .Blood Blood  Final Report:    No Growth Final    Culture - Blood (collected 09 Jul 2021 17:39)  Source: .Blood Blood-Venous  Final Report:    No Growth Final    Culture - Sputum (collected 07 Jul 2021 11:47)  Source: .Sputum Sputum  Final Report:    Moderate Citrobacter freundii    Moderate Serratia marcescens    Normal Respiratory Bria present  Organism: Citrobacter freundii  Serratia marcescens  Organism: Serratia marcescens    Sensitivities:      -  Amikacin: S <=16      -  Amoxicillin/Clavulanic Acid: R >16/8      -  Ampicillin: R 16 These ampicillin results predict results for amoxicillin      -  Ampicillin/Sulbactam: R 16/8 Enterobacter, Citrobacter, and Serratia may develop resistance during prolonged therapy (3-4 days)      -  Aztreonam: S <=4      -  Cefazolin: R >16 Enterobacter, Citrobacter, and Serratia may develop resistance during prolonged therapy (3-4 days)      -  Cefepime: S <=2      -  Cefoxitin: R <=8      -  Ceftriaxone: S <=1 Enterobacter, Citrobacter, and Serratia may develop resistance during prolonged therapy      -  Ciprofloxacin: S <=0.25      -  Ertapenem: S <=0.5      -  Gentamicin: S <=2      -  Levofloxacin: S <=0.5      -  Meropenem: S <=1      -  Piperacillin/Tazobactam: S <=8      -  Tobramycin: S 4      -  Trimethoprim/Sulfamethoxazole: S <=0.5/9.5      Method Type: CLAU  Organism: Citrobacter freundii    Sensitivities:      -  Amikacin: S <=16      -  Amoxicillin/Clavulanic Acid: R >16/8      -  Ampicillin: R >16 These ampicillin results predict results for amoxicillin      -  Ampicillin/Sulbactam: R >16/8 Enterobacter, Citrobacter, and Serratia may develop resistance during prolonged therapy (3-4 days)      -  Aztreonam: I 16      -  Cefazolin: R >16 Enterobacter, Citrobacter, and Serratia may develop resistance during prolonged therapy (3-4 days)      -  Cefepime: S <=2      -  Cefoxitin: R >16      -  Ceftriaxone: R 32 Enterobacter, Citrobacter, and Serratia may develop resistance during prolonged therapy      -  Ciprofloxacin: S <=0.25      -  Ertapenem: S <=0.5      -  Gentamicin: S <=2      -  Imipenem: S <=1      -  Levofloxacin: S <=0.5      -  Meropenem: S <=1      -  Piperacillin/Tazobactam: S <=8      -  Tobramycin: S <=2      -  Trimethoprim/Sulfamethoxazole: S <=0.5/9.5      Method Type: CLAU    Culture - Sputum (collected 30 Jun 2021 20:57)  Source: .Sputum Sputum  Final Report:    Normal Respiratory Bria present    Culture - Blood (collected 29 Jun 2021 17:31)  Source: .Blood Blood-Peripheral  Final Report:    No Growth Final    Culture - Blood (collected 29 Jun 2021 17:31)  Source: .Blood Blood-Peripheral  Final Report:    No Growth Final      HIV-1/2 Combo Result: Nonreact (01-14-21 @ 08:18)    ABS CD4: 129 /uL (04-07-20 @ 08:38)                    COVID-19 PCR: NotDetec (06-14-21 @ 12:24)    COVID-19 Le[place] Domain AB Interp: Positive (06-15-21 @ 10:15)  COVID-19 IgG Antibody Interpretation: Positive (01-14-21 @ 08:55)  COVID-19 IgG Antibody Interpretation: Positive (09-08-20 @ 08:50)            Lactate Dehydrogenase, Serum: 430 (07-28)  Lactate Dehydrogenase, Serum: 300 (07-27)  Lactate Dehydrogenase, Serum: 240 (07-26)  Lactate Dehydrogenase, Serum: 242 (07-26)  Lactate Dehydrogenase, Serum: 298 (07-26)  Lactate Dehydrogenase, Serum: 237 (07-25)  Lactate Dehydrogenase, Serum: 260 (07-24)  Lactate Dehydrogenase, Serum: 234 (07-23)  Lactate Dehydrogenase, Serum: 263 (07-22)        Blood Gas Arterial, Lactate: 1.3 (07-28 @ 00:23)  Blood Gas Arterial, Lactate: 1.3 (07-27 @ 19:51)  Blood Gas Arterial, Lactate: 1.2 (07-27 @ 14:46)  Blood Gas Arterial, Lactate: 1.1 (07-27 @ 09:45)      RADIOLOGY:  imaging below personally reviewed    < from: Xray Chest 1 View- PORTABLE-Routine (Xray Chest 1 View- PORTABLE-Routine in AM.) (07.28.21 @ 02:09) >  IMPRESSION:    The heart isslightly enlarged. The lungs are clear. No pleural effusion.. All life supporting devices are in good position and unchanged when compared to previous study done on July 27, 2021 at 2:20 AM. The overall appearance of the chest remain unchanged.     INFECTIOUS DISEASE FOLLOW UP NOTE:    Interval History/ROS: Patient is a 66y old  Male who presents with a chief complaint of Anemia, Supratherapeutic INR, Dark Stools (27 Jul 2021 22:25)      Overnight events: Patient was febrile overnight with Tmax 101.7. Defervesce in AM. Still sedated and vented. Have feeds? regurgitating back up. Saturating well.       REVIEW OF SYSTEMS:  Unable to obtain as patient is sedated      Prior hospital charts reviewed [Yes]  Primary team notes reviewed [Yes]  Other consultant notes reviewed [Yes]    Allergies:  No Known Allergies      ANTIMICROBIALS:   meropenem  IVPB 1000 every 8 hours  meropenem  IVPB    vancomycin    Solution 125 daily      OTHER MEDS: MEDICATIONS  (STANDING):  acetaminophen    Suspension .. 650 every 6 hours PRN  aMIOdarone    Tablet 200 daily  clonazePAM  Tablet 0.5 at bedtime  dexMEDEtomidine Infusion 1.5 <Continuous>  DOBUTamine Infusion 2.5 <Continuous>  furosemide   Injectable 10 once  insulin regular Infusion 3 <Continuous>  norepinephrine Infusion 0.05 <Continuous>  pantoprazole Infusion 8 <Continuous>  propofol Infusion 25 <Continuous>  simethicone 80 every 8 hours PRN  vasopressin Infusion 0.017 <Continuous>      Vital Signs Last 24 Hrs  T(F): 97.5 (07-28-21 @ 08:00), Max: 105.8 (07-27-21 @ 00:00)    Vital Signs Last 24 Hrs  HR: 93 (07-28-21 @ 09:09) (0 - 122)  RR: 22 (07-28-21 @ 09:00)  SpO2: 100% (07-28-21 @ 09:09) (73% - 100%)      EXAM:  GENERAL: Sedated  HEAD: Atraumatic, Normocephalic  EYES: EOMI, PERRLA, conjunctiva pink and cornea white  ENT: Normal external ears and nose, no discharges; moist mucous membranes, no erythema on posterior oropharynx; no oral thrush  NECK: Supple, nontender to palpation; no JVD; trach in place, vented  CHEST/LUNG: Good air entry bilaterally; right chest tube in place  HEART: Regular rate and rhythm; LVAD mechanical sound heard on auscultation  ABDOMEN: Soft, nondistended; no rebound tenderness, no guarding; no hepatomegaly; normoactive bowel sounds; LVAD exit site looks clean and without erythema  EXTREMITIES: extremities cold to touch, 2+ Peripheral Pulses, brisk capillary refill. No clubbing, cyanosis, or petal edema  NERVOUS SYSTEM: Sedated  MSK: Sedated; no joint erythema, swelling or pain  SKIN: No superficial thrombophlebitis; RIJ central line site clean    Labs:                        8.6    17.87 )-----------( 130      ( 28 Jul 2021 00:32 )             26.3     07-28    129<L>  |  99  |  22  ----------------------------<  149<H>  4.0   |  19<L>  |  0.55    Ca    8.6      28 Jul 2021 00:32  Phos  1.9     07-28  Mg     1.8     07-28    TPro  6.2  /  Alb  3.1<L>  /  TBili  1.7<H>  /  DBili  x   /  AST  195<H>  /  ALT  157<H>  /  AlkPhos  131<H>  07-28      WBC Trend:  WBC Count: 17.87 (07-28-21 @ 00:32)  WBC Count: 15.62 (07-27-21 @ 04:13)  WBC Count: 22.14 (07-27-21 @ 00:34)  WBC Count: 20.12 (07-26-21 @ 20:04)      Creatine Trend:  Creatinine, Serum: 0.55 (07-28)  Creatinine, Serum: 1.06 (07-27)  Creatinine, Serum: 0.76 (07-26)  Creatinine, Serum: 0.70 (07-26)      Liver Biochemical Testing Trend:  Alanine Aminotransferase (ALT/SGPT): 157 *H* (07-28)  Alanine Aminotransferase (ALT/SGPT): 48 *H* (07-27)  Alanine Aminotransferase (ALT/SGPT): 15 (07-26)  Alanine Aminotransferase (ALT/SGPT): 16 (07-26)  Alanine Aminotransferase (ALT/SGPT): 17 (07-25)  Aspartate Aminotransferase (AST/SGOT): 195 (07-28-21 @ 00:32)  Aspartate Aminotransferase (AST/SGOT): 68 (07-27-21 @ 00:34)  Aspartate Aminotransferase (AST/SGOT): 18 (07-26-21 @ 05:58)  Aspartate Aminotransferase (AST/SGOT): 21 (07-26-21 @ 00:50)  Aspartate Aminotransferase (AST/SGOT): 22 (07-25-21 @ 06:11)  Bilirubin Total, Serum: 1.7 (07-28)  Bilirubin Total, Serum: 1.1 (07-27)  Bilirubin Total, Serum: 0.6 (07-26)  Bilirubin Total, Serum: 0.5 (07-26)  Bilirubin Total, Serum: 0.4 (07-25)      Trend LDH  07-28-21 @ 00:32  430<H>  07-27-21 @ 00:34  300<H>  07-26-21 @ 07:29  240  07-26-21 @ 05:58  242  07-26-21 @ 00:50  298<H>          MICROBIOLOGY:    MRSA PCR Result.: NotDetec (06-14-21 @ 23:02)      Culture - Sputum (collected 27 Jul 2021 19:08)  Source: .Sputum Sputum    Culture - Blood (collected 26 Jul 2021 22:59)  Source: .Blood Blood-Peripheral  Preliminary Report:    Growth in aerobic and anaerobic bottles: Gram Negative Rods    Culture - Blood (collected 26 Jul 2021 09:12)  Source: .Blood Blood-Peripheral  Preliminary Report:    Growth in aerobic and anaerobic bottles: Serratia marcescens    ***Blood Panel PCR results on this specimen are available    approximately 3 hours after the Gram stain result.***    Gram stain, PCR, and/or culture results may not always    correspond due to difference in methodologies.    ************************************************************    This PCR assay was performed by multiplex PCR. This    Assay tests for 66 bacterial and resistance gene targets.    Please refer to the [facility] test directory    at [email or web address] for details.  Organism: Blood Culture PCR  Organism: Blood Culture PCR    Sensitivities:      -  Serratia marcescens: Detec      Method Type: PCR    Culture - Blood (collected 14 Jul 2021 22:13)  Source: .Blood Blood  Final Report:    No Growth Final    Culture - Blood (collected 14 Jul 2021 22:13)  Source: .Blood Blood  Final Report:    No Growth Final    Culture - Blood (collected 09 Jul 2021 17:39)  Source: .Blood Blood-Venous  Final Report:    No Growth Final    Culture - Sputum (collected 07 Jul 2021 11:47)  Source: .Sputum Sputum  Final Report:    Moderate Citrobacter freundii    Moderate Serratia marcescens    Normal Respiratory Bria present  Organism: Citrobacter freundii  Serratia marcescens  Organism: Serratia marcescens    Sensitivities:      -  Amikacin: S <=16      -  Amoxicillin/Clavulanic Acid: R >16/8      -  Ampicillin: R 16 These ampicillin results predict results for amoxicillin      -  Ampicillin/Sulbactam: R 16/8 Enterobacter, Citrobacter, and Serratia may develop resistance during prolonged therapy (3-4 days)      -  Aztreonam: S <=4      -  Cefazolin: R >16 Enterobacter, Citrobacter, and Serratia may develop resistance during prolonged therapy (3-4 days)      -  Cefepime: S <=2      -  Cefoxitin: R <=8      -  Ceftriaxone: S <=1 Enterobacter, Citrobacter, and Serratia may develop resistance during prolonged therapy      -  Ciprofloxacin: S <=0.25      -  Ertapenem: S <=0.5      -  Gentamicin: S <=2      -  Levofloxacin: S <=0.5      -  Meropenem: S <=1      -  Piperacillin/Tazobactam: S <=8      -  Tobramycin: S 4      -  Trimethoprim/Sulfamethoxazole: S <=0.5/9.5      Method Type: CLAU  Organism: Citrobacter freundii    Sensitivities:      -  Amikacin: S <=16      -  Amoxicillin/Clavulanic Acid: R >16/8      -  Ampicillin: R >16 These ampicillin results predict results for amoxicillin      -  Ampicillin/Sulbactam: R >16/8 Enterobacter, Citrobacter, and Serratia may develop resistance during prolonged therapy (3-4 days)      -  Aztreonam: I 16      -  Cefazolin: R >16 Enterobacter, Citrobacter, and Serratia may develop resistance during prolonged therapy (3-4 days)      -  Cefepime: S <=2      -  Cefoxitin: R >16      -  Ceftriaxone: R 32 Enterobacter, Citrobacter, and Serratia may develop resistance during prolonged therapy      -  Ciprofloxacin: S <=0.25      -  Ertapenem: S <=0.5      -  Gentamicin: S <=2      -  Imipenem: S <=1      -  Levofloxacin: S <=0.5      -  Meropenem: S <=1      -  Piperacillin/Tazobactam: S <=8      -  Tobramycin: S <=2      -  Trimethoprim/Sulfamethoxazole: S <=0.5/9.5      Method Type: CLAU    Culture - Sputum (collected 30 Jun 2021 20:57)  Source: .Sputum Sputum  Final Report:    Normal Respiratory Bria present    Culture - Blood (collected 29 Jun 2021 17:31)  Source: .Blood Blood-Peripheral  Final Report:    No Growth Final    Culture - Blood (collected 29 Jun 2021 17:31)  Source: .Blood Blood-Peripheral  Final Report:    No Growth Final      HIV-1/2 Combo Result: Nonreact (01-14-21 @ 08:18)    ABS CD4: 129 /uL (04-07-20 @ 08:38)                    COVID-19 PCR: NotDetec (06-14-21 @ 12:24)    COVID-19 Le[place] Domain AB Interp: Positive (06-15-21 @ 10:15)  COVID-19 IgG Antibody Interpretation: Positive (01-14-21 @ 08:55)  COVID-19 IgG Antibody Interpretation: Positive (09-08-20 @ 08:50)            Lactate Dehydrogenase, Serum: 430 (07-28)  Lactate Dehydrogenase, Serum: 300 (07-27)  Lactate Dehydrogenase, Serum: 240 (07-26)  Lactate Dehydrogenase, Serum: 242 (07-26)  Lactate Dehydrogenase, Serum: 298 (07-26)  Lactate Dehydrogenase, Serum: 237 (07-25)  Lactate Dehydrogenase, Serum: 260 (07-24)  Lactate Dehydrogenase, Serum: 234 (07-23)  Lactate Dehydrogenase, Serum: 263 (07-22)        Blood Gas Arterial, Lactate: 1.3 (07-28 @ 00:23)  Blood Gas Arterial, Lactate: 1.3 (07-27 @ 19:51)  Blood Gas Arterial, Lactate: 1.2 (07-27 @ 14:46)  Blood Gas Arterial, Lactate: 1.1 (07-27 @ 09:45)      RADIOLOGY:  imaging below personally reviewed    < from: Xray Chest 1 View- PORTABLE-Routine (Xray Chest 1 View- PORTABLE-Routine in AM.) (07.28.21 @ 02:09) >  IMPRESSION:    The heart isslightly enlarged. The lungs are clear. No pleural effusion.. All life supporting devices are in good position and unchanged when compared to previous study done on July 27, 2021 at 2:20 AM. The overall appearance of the chest remain unchanged.

## 2021-07-28 NOTE — PROGRESS NOTE ADULT - ATTENDING COMMENTS
Defervescing but still febrile. Notably low flowing 2 days prior but improved. On exam, sedated, JVP approx 12 cm, LVAD hum, CTAB, nontender abdomen, no pedal edema. Labs reviewed - WBC 17 (downtrending), Hb 8 (downtrending), BUN/Cr 28/1.06,  (stable), LFTs uptrending.   - c/w cefepime for Serratia bacteremia; would plan on CT C/A/P contrast with RUQ US  - goal CVP 8-10; give lasix 20 IV prn   - maintain MAP >65  - defer on endoscopy +/- VCE given sepsis   - prognosis guarded

## 2021-07-28 NOTE — PROGRESS NOTE ADULT - ASSESSMENT
67 YO M with a history of stage D NICM s/p HM2 on 9/2017 as DT (due to severe PAD) with TV ring, prior COVID-19 infection 4/2020, recurrent syncope post LVAD s/p ILR, and chronic abdominal pain with prior negative workup who was admitted with symptomatic anemia with Hgb 4.5 in setting of INR 8.8 without hemodynamic instability. He was transfused and underwent VCE which showed active bleeding in the mid small bowel but subsequent enteroscopy 6/15 did not reveal any active bleeding. He acutely decompensated after procedure with fever/hypertension, low flow alarms, and pulmonary infiltrate with hypoxia requiring intubation from probable aspiration PNA. His LDH is normal and there are no signs of overt LVAD dysfunction on echo though signs of insufficiently unloaded ventricle for which his speed has been increased. Course notable for inability to wean ventilator with persistent secretions for which he underwent tracheostomy as well as acute c diff colitis. Worsening anemia over last few days requiring transfusion with low flow alarms, concerning for recurrent GI bleed. Now w/ hypotension and fevers c/f sepsis, Cxs pos for serratia.

## 2021-07-28 NOTE — PROGRESS NOTE ADULT - ASSESSMENT
Mr. Vic Quispe initially admitted for melena and anemia in the setting of supratherapeutic INR.  GI reconsulted for worsening anemia.    # Acute blood loss anemia and melena - hemodynamically unchanged. Likely represents recurrent GI bleed. Initial melena workup lead to capsule endoscopy on 6/14/2021 which revealed active bleeding in small bowel.  Single balloon 6/15/2021 revealed old blood in proximal ileum. 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  Plan for repeat capsule endoscopy, with further studies pending results and clinical course.  # Recent melena with presumed small bowel source  # LVAD on anticoagulation with coumadin  # Trach    Recommendations:  - PPI 40mg IV BID  - trend vitals, CBC, and monitor for clinical signs of bleeding  - transfuse for goal hemoglobin >/= 7.0  - Cardiology managing anticoagulation  - no endoscopy planned at this time given recent fevers, pressor requirement, and patient on ventilator      Tito Guerrier, PGY-4  GI/Hepatology Fellow    MONDAY-FRIDAY 8AM-5PM:  Available via [employer] Teams  Pager# [phone] ([facility]) or [phone] ([facility])  GI Phone# [phone] ([facility])    NON-URGENT CONSULTS:  Please email [email or web address]  AT NIGHT AND ON WEEKENDS:  Contact on-call GI fellow via answering service ([phone]) from 5pm-8am and on weekends/holidays     Mr. Vic Quispe initially admitted for melena and anemia in the setting of supratherapeutic INR.  GI reconsulted for worsening anemia.    # Acute blood loss anemia and melena - hemodynamically unchanged. Likely represents recurrent GI bleed. Initial melena workup lead to capsule endoscopy on 6/14/2021 which revealed active bleeding in small bowel.  Single balloon 6/15/2021 revealed old blood in proximal ileum. 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  Plan for repeat capsule endoscopy, with further studies pending results and clinical course.  # Recent melena with presumed small bowel source  # LVAD on anticoagulation with coumadin  # Trach    Recommendations:  - PPI 40mg IV BID  - trend vitals, CBC, and monitor for clinical signs of bleeding  - transfuse for goal hemoglobin >/= 7.0  - Cardiology managing anticoagulation  - no endoscopy planned at this time given recent fevers and no active bleeding      Tito Guerrier, PGY-4  GI/Hepatology Fellow    MONDAY-FRIDAY 8AM-5PM:  Available via [employer] Teams  Pager# [phone] ([facility]) or [phone] ([facility])  GI Phone# [phone] ([facility])    NON-URGENT CONSULTS:  Please email [email or web address] OR [email or web address]  AT NIGHT AND ON WEEKENDS:  Contact on-call GI fellow via answering service ([phone]) from 5pm-8am and on weekends/holidays

## 2021-07-28 NOTE — PROGRESS NOTE ADULT - PROBLEM SELECTOR PLAN 3
- covering broadly on misty/vanc  - remains on levo and vaso, wean as tolerated  - keep slight negative in s/o elevated CVP  - repeat blood Cxs   - consider additional imaging, RUQ US, CTAP without constrast

## 2021-07-28 NOTE — PROGRESS NOTE ADULT - ASSESSMENT
66 years old male with a history of stage D NICM s/p HM2 on 9/2017 as DT (due to severe PAD) with TV ring, prior COVID-19 infection 4/2020, recurrent syncope post LVAD s/p ILR, admitted on 6/14/21with symptomatic anemia with Hgb 4.5 in setting of INR 8.8 without hemodynamic instability. He was transfused and underwent VCE which showed active bleeding in the mid small bowel but subsequent enteroscopy 6/15 did not reveal any active bleeding. He acutely decompensated after procedure with fever/hypertension, low flow alarms, and pulmonary infiltrate with hypoxia requiring intubation from probable aspiration PNA. Course notable for inability to wean ventilator with persistent secretions for which he underwent tracheostomy. He developed C. diff colitis and is on vancomycin taper. He is currently vent dependent now and requiring increased pressor support. BCx showed serratia, which correlates to his sputum culture back in 7/7. The source could be likely intra-abdominal as CXR showed no signs of consolidations. In the setting transaminitis, we will need to look into possible intra-abdominal infection. Serratia is unlikely to cause device infection (gram negative organism) but if his blood culture is persistently positive, patient might need further evaluation on possible device infection.      IMPRESSION:  Septic shock secondary to high grade Serratia bacteremia, possibly secondary to intra-abdominal infection vs pneumonia (low suspicion)  Tension pneumothorax s/p right chest tube 7/26  Transaminitis, cholestatic pattern, acalculous cholecystitis vs cholangitis?  C. diff colitis on PO vancomycin taper  ICM s/p LVAD  BCx serratia 7/26  Sputum culture serratia R amp 7/7; rare GNR 7/27  CXR no PNA  No signs of drive line infection      RECOMMENDATIONS:  - Continue IV meropenem 1gm q8hrs to cover serratia and other intra-abdominal organisms, can give one dose of IV amikacin 10mg/kg if he continues to deteriorates   - Continue PO vancomycin 125mg q24hrs while on board-spectrum antibiotics  - CT chest and CT abdomen/pelvis with IV contrast are preferred to look for source of bacteremia when patient is stable; agree with US RUQ for now  - Please repeat blood cultures until clearance (7/27, 7/28 pending)  - Follow up on sensitivities of serratia and sputum culture  - Will continue to follow  - Guarded prognosis      * THIS IS AN INCOMPLETE NOTE. FINAL RECOMMENDATION WILL BE UPDATED AFTER DISCUSSING WITH ATTENDING *        Marisa Hall DO  PGY4 ID fellow  Pager: [phone]  [facility] pager ID: [phone]  Please contact me via page or text through Microsoft Teams  If after 5PM or on weekends, please call [phone]     66 years old male with a history of stage D NICM s/p HM2 on 9/2017 as DT (due to severe PAD) with TV ring, prior COVID-19 infection 4/2020, recurrent syncope post LVAD s/p ILR, admitted on 6/14/21with symptomatic anemia with Hgb 4.5 in setting of INR 8.8 without hemodynamic instability. He was transfused and underwent VCE which showed active bleeding in the mid small bowel but subsequent enteroscopy 6/15 did not reveal any active bleeding. He acutely decompensated after procedure with fever/hypertension, low flow alarms, and pulmonary infiltrate with hypoxia requiring intubation from probable aspiration PNA. Course notable for inability to wean ventilator with persistent secretions for which he underwent tracheostomy. He developed C. diff colitis and is on vancomycin taper. He is currently vent dependent now and requiring increased pressor support. BCx showed serratia, which correlates to his sputum culture back in 7/7. The source could be likely intra-abdominal as CXR showed no signs of consolidations. In the setting transaminitis, we will need to look into possible intra-abdominal infection. Serratia is unlikely to cause device infection (gram negative organism) but if his blood culture is persistently positive, patient might need further evaluation on possible device infection.      IMPRESSION:  Septic shock secondary to high grade Serratia bacteremia, possibly secondary to intra-abdominal infection vs pneumonia (low suspicion)  Tension pneumothorax s/p right chest tube 7/26  Transaminitis, cholestatic pattern, acalculous cholecystitis vs cholangitis?  C. diff colitis on PO vancomycin taper  ICM s/p LVAD  BCx serratia 7/26  Sputum culture serratia R amp 7/7; rare GNR 7/27  CXR no PNA  No signs of drive line infection      RECOMMENDATIONS:  - Continue IV meropenem 1gm q8hrs to cover serratia and other intra-abdominal organisms, can give one dose of IV amikacin 10mg/kg if he continues to deteriorates   - Increase PO vancomycin to 125mg q12hrs while on board-spectrum antibiotics  - CT chest and CT abdomen/pelvis with IV contrast are preferred to look for source of bacteremia when patient is stable; follow up on US RUQ  - Please repeat blood cultures until clearance (7/27, 7/28 pending)  - Follow up on sensitivities of serratia and sputum culture  - Will continue to follow  - Guarded prognosis      Case discussed with attending      Marisa Hall, DO  PGY4 ID fellow  Pager: [phone]  [facility] pager ID: [phone]  Please contact me via page or text through Microsoft Teams  If after 5PM or on weekends, please call [phone]

## 2021-07-28 NOTE — PROGRESS NOTE ADULT - ATTENDING COMMENTS
Agree with above. Patient has not required transfusions x 48 hours, no clinical bleeding. would continue to monitor, no endoscopic evaluation recommended at this time especially given new sepsis.

## 2021-07-28 NOTE — PROGRESS NOTE ADULT - SUBJECTIVE AND OBJECTIVE BOX
RICKY HAMPTON  MRN-[id number]  Patient is a 66y old  Male who presents with a chief complaint of Anemia, Supratherapeutic INR, Dark Stools (2021 10:03)    HPI:  65M PMH ACC/AHA stage D HF due to NICM HM2 LVAD , TV annuloplasty ring 17 as destination therapy due to severe peripheral artery disease with significant stenosis  SIADH, Depression, CKD-3 with hyperkalemia, past E. coli UTIs, driveline drainage (21) and COVID-19 (back in 2020)  He was recently seen in clinic where he complained of abdominal pain and dark stools w constipation back in May. He presents to [facility] ER today weakness and fatigue, moderate and + Black stools for three days, on coumadin secondary to warfarin use in the setting of an LVAD. Patient has required transfusions for GIB in the past. Mostly recently back in 2021 pt had anemia with dark stools. No interventions was done at that time. However Last Endoscopy was done in 2020 (negative). Today labs show patient is anemic with H/H of 4.5/16.3,. INR is 8.84 MAP in the 90s, Temp 35.1. He denies any chest pain, shortness of breath, dizziness, abd pain, nausea or vomiting.       (2021 16:57)      Surgery/Hospital Course:   admit for melena w/ anemia, INR 8.84   6/15 Capsul study (+) for small bowel bleed, balloon endoscopy (old blood in prox ileum); post EGD - septic w/ L opacity, re-intubated for concern for aspiration, TTE (Mod MR, decrease biV w/ interventricular septum boweing towards R)   bronch    +C Diff    TC    CT C/A/P: Fluid filled colon which may be 2/2 rapid transit. Small bilateral pleffs with associates. Compressive atelectasis New ISABELLE & LLL  parenchymal opacities, suspicious for pneumonia. 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    #8 Shiley trach at bedside    CPAP trials    LVAD speed increased to 9200   Bronch; Central line dc'ed   TC since , continue as tolerated. Patient transferred to SDU.    Cont PT, no trach downsize today(#8cuffed)/ Anticoagulation/ Continue TF, speech f/u/ Vanco taper    oob to chair  INR  2.47  5 mg coumadin     increased lop to 25 q8  per HF   INR today 2.64.  H&H 7.3 this AM.  Will repeat CBC at noon, and will send stool guaiac Patient with persistent abdominal tenderness, rate of tube feeds decreased.  No nausea/vomiting.     INR today 2.4H&H 9.1/28.6 low flow overnight /N&V  NPO resolved for capsule study  Coumadin on hold refusing Tube feeds on D5 1/ normal  @50 cc/hr   INR 2.69  H&H 7..1 refusing Tube feeds on D5 1/2 normal  @50 cc/hr. This am + BM Anusha Oneill HF  aware- PRBC x1  GI team consulted -  NPO  plan on study in am-  D/w Dr Cadet Patient  to return to CTU for further management; 1PRBCS    Post op INR 2.2 today.  No bleeding. BC + for SM.  Pt is hypotensive requiring pressor and inotropic support.  ID follow up today on Cefepime will follow.   R PTC for PTX       Today:    REVIEW OF SYSTEMS:  Unable to obtain secondary to pt being trached.     ICU Vital Signs Last 24 Hrs  T(C): 36.4 (2021 08:00), Max: 38.7 (2021 00:30)  T(F): 97.5 (2021 08:00), Max: 101.7 (2021 00:30)  HR: 93 (2021 09:09) (0 - 122)  BP: --  BP(mean): --  ABP: 93/76 (2021 09:00) (74/59 - 119/83)  ABP(mean): 84 (2021 09:00) (64 - 94)  RR: 22 (2021 09:00) (17 - 71)  SpO2: 100% (2021 09:09) (73% - 100%)      Physical Exam:  Gen:  intubated   CNS: sedated 	  Neck: no JVD, +TC   RES : clear , no wheezing              CVS: +LVAD hum   Abd: Soft, +LLQ tender to palpation   Skin: No rash.  Ext:  no edema    ============================I/O===========================   I&O's Detail    2021 07:  -  2021 07:00  --------------------------------------------------------  IN:    Albumin 5%  - 250 mL: 500 mL    DOBUTamine: 23.6 mL    DOBUTamine: 123.9 mL    Enteral Tube Flush: 30 mL    Insulin: 64 mL    IV PiggyBack: 650 mL    Miscellaneous Tube Feedin mL    Norepinephrine: 215 mL    Pantoprazole: 240 mL    Propofol: 496.4 mL    sodium chloride 0.9%: 1200 mL    Vasopressin: 141 mL  Total IN: 4333.9 mL    OUT:    Chest Tube (mL): 130 mL    Dexmedetomidine: 0 mL    Indwelling Catheter - Urethral (mL): 775 mL  Total OUT: 905 mL    Total NET: 3428.9 mL      2021 07:  -  2021 10:21  --------------------------------------------------------  IN:    DOBUTamine: 11.8 mL    Insulin: 10 mL    Miscellaneous Tube Feedin mL    Norepinephrine: 8.8 mL    Pantoprazole: 20 mL    Propofol: 37.6 mL    sodium chloride 0.9%: 100 mL    Vasopressin: 12 mL  Total IN: 300.2 mL    OUT:    Chest Tube (mL): 0 mL    Dexmedetomidine: 0 mL    Indwelling Catheter - Urethral (mL): 110 mL  Total OUT: 110 mL    Total NET: 190.2 mL        ============================ LABS =========================                        8.6    17.87 )-----------( 130      ( 2021 00:32 )             26.3         129<L>  |  99  |  22  ----------------------------<  149<H>  4.0   |  19<L>  |  0.55    Ca    8.6      2021 00:32  Phos  1.9       Mg     1.8         TPro  6.2  /  Alb  3.1<L>  /  TBili  1.7<H>  /  DBili  x   /  AST  195<H>  /  ALT  157<H>  /  AlkPhos  131<H>      LIVER FUNCTIONS - ( 2021 00:32 )  Alb: 3.1 g/dL / Pro: 6.2 g/dL / ALK PHOS: 131 U/L / ALT: 157 U/L / AST: 195 U/L / GGT: x           PT/INR - ( 2021 14:23 )   PT: 25.8 sec;   INR: 2.23 ratio         PTT - ( 2021 11:11 )  PTT:35.5 sec  ABG - ( 2021 10:04 )  pH, Arterial: 7.39  pH, Blood: x     /  pCO2: 40    /  pO2: 182   / HCO3: 23    / Base Excess: -1.1  /  SaO2: 100                 ======================Micro/Rad/Cardio=================  Culture: Reviewed   CXR: Reviewed  Echo:Reviewed  ======================================================  PAST MEDICAL & SURGICAL HISTORY:  CHF (congestive heart failure)    CAD (coronary artery disease)    Depression    Pleural effusion    History of 2019 novel coronavirus disease (COVID-19)  2020    Hemorrhoids    Bleeding hemorrhoids    Peripheral arterial disease    Claudication    BPH with urinary obstruction    ACC/AHA stage D systolic heart failure    Anticoagulation goal of INR 2.0 to 2.5    Falls    Clavicle fracture    CKD (chronic kidney disease), stage III    Iron deficiency anemia    H/O epistaxis    Vertigo    GI bleed    S/P TVR (tricuspid valve repair)    S/P ventricular assist device    S/P endoscopy      ====================ASSESSMENT ==============  Stage D Nonischemic Cardiomyopathy, Status Post HM2 on 2017    Cardiogenic shock  Hemodynamic instability   Acute hypoxemic respiratory failure  GI bleed , Status Post Enteroscopy   Anemia, in setting of melena   Chronic Kidney Disease  Stress hyperglycemia   C.diff positive on    Hypovolemic shock  Hyponatremia   Leukocytosis    Plan:  ====================== NEUROLOGY=====================  Sedated with IV Precedex and IV Propofol   Tylenol PRN for analgesia   C/w clonazepam for agitation    acetaminophen    Suspension .. 650 milliGRAM(s) Oral every 6 hours PRN Mild Pain (1 - 3)  clonazePAM  Tablet 0.5 milliGRAM(s) Oral at bedtime  dexMEDEtomidine Infusion 1.5 MICROgram(s)/kG/Hr (29.4 mL/Hr) IV Continuous <Continuous>  propofol Infusion 25 MICROgram(s)/kG/Min (11.8 mL/Hr) IV Continuous <Continuous>    ==================== RESPIRATORY======================  Acute hypoxemic respiratory failure s/p #8 shiley trach on    Requiring full vent support, continue close monitoring of respiratory rate and breathing pattern, following of ABG’s with A-line monitoring, continuous pulse oximetry monitoring.   R PTC placed on  for mod R PTX, continue to monitor output.     Mechanical Ventilation:  Mode: AC/ CMV (Assist Control/ Continuous Mandatory Ventilation)  RR (machine): 16  TV (machine): 500  FiO2: 50  PEEP: 5  ITime: 1  MAP: 11  PIP: 23      ====================CARDIOVASCULAR==================  Stage D Nonischemic Cardiomyopathy, Status Post HM2 on 2017; LVAD settings 9200 with flow of 5.1   TTE : reveals at least moderate MR, mild AI, severe global LV syst dysfxn, dec RV fxn.  Continue invasive hemodynamic monitoring   Inotropic support with IV Dobutamine, maintain MAPs 70s-80s.   Maintain pressor support with IV Vasopressin, on and off IV Levophed   Rate control with amiodarone     aMIOdarone    Tablet 200 milliGRAM(s) Oral daily  DOBUTamine Infusion 2.5 MICROgram(s)/kG/Min (5.89 mL/Hr) IV Continuous <Continuous>  norepinephrine Infusion 0.05 MICROgram(s)/kG/Min (7.36 mL/Hr) IV Continuous <Continuous>  vasopressin Infusion 0.017 Unit(s)/Min (1 mL/Hr) IV Continuous <Continuous>    ===================HEMATOLOGIC/ONC ===================  Anemia due to acute blood loss associated with upper GI bleed   Continue to monitor H&H/Plts     ===================== RENAL =========================  Continue monitoring urine output, I&OS, BUN/Cr   Replete lytes PRN. Keep K> 4 and Mg >2   Diuresis with Lasix     furosemide   Injectable 10 milliGRAM(s) IV Push once    ==================== GASTROINTESTINAL===================  GI bleed in setting of recent melena, GI following - continue Protonix drip   C.diff + on , continue vanco   Enteral feeding held   Simethicone PRN dyspepsia     magnesium sulfate  IVPB 1 Gram(s) IV Intermittent once  pantoprazole Infusion 8 mG/Hr (10 mL/Hr) IV Continuous <Continuous>  simethicone 80 milliGRAM(s) Chew every 8 hours PRN Dyspepsia    =======================    ENDOCRINE  =====================  Stress hyperglycemia, continue glucose control with IV insulin drip     insulin regular Infusion 3 Unit(s)/Hr (3 mL/Hr) IV Continuous <Continuous>    ========================INFECTIOUS DISEASE================  Afebrile, WBC rising 15.62->17.87   Monitor temperature and trend WBC   BCx+  +Serratia marcescens c/w meropenem   Plan for RUQ sono to eval for possible source of infection  C.diff + on , continue vanco   F/u repeat cultures     meropenem  IVPB 1000 milliGRAM(s) IV Intermittent every 8 hours  vancomycin    Solution 125 milliGRAM(s) Oral daily      Patient requires continuous monitoring with bedside rhythm monitoring, pulse ox monitoring, and intermittent blood gas analysis. Care plan discussed with ICU care team. Patient remained critical and at risk for life threatening decompensation.     By signing my name below, I, Agnieszka Cherry, attest that this documentation has been prepared under the direction and in the presence of CLAUDIA Alfaro   Electronically signed: Cesia Shaffer, 21 @ 10:21    I, Bertrand Turk, personally performed the services described in this documentation. all medical record entries made by the luisibe were at my direction and in my presence. I have reviewed the chart and agree that the record reflects my personal performance and is accurate and complete  Electronically signed: CLAUDIA Alfaro        RICKY HAMPTON  MRN-[id number]  Patient is a 66y old  Male who presents with a chief complaint of Anemia, Supratherapeutic INR, Dark Stools (2021 10:03)    HPI:  65M PMH ACC/AHA stage D HF due to NICM HM2 LVAD , TV annuloplasty ring 17 as destination therapy due to severe peripheral artery disease with significant stenosis  SIADH, Depression, CKD-3 with hyperkalemia, past E. coli UTIs, driveline drainage (21) and COVID-19 (back in 2020)  He was recently seen in clinic where he complained of abdominal pain and dark stools w constipation back in May. He presents to [facility] ER today weakness and fatigue, moderate and + Black stools for three days, on coumadin secondary to warfarin use in the setting of an LVAD. Patient has required transfusions for GIB in the past. Mostly recently back in 2021 pt had anemia with dark stools. No interventions was done at that time. However Last Endoscopy was done in 2020 (negative). Today labs show patient is anemic with H/H of 4.5/16.3,. INR is 8.84 MAP in the 90s, Temp 35.1. He denies any chest pain, shortness of breath, dizziness, abd pain, nausea or vomiting.       (2021 16:57)      Surgery/Hospital Course:   admit for melena w/ anemia, INR 8.84   6/15 Capsul study (+) for small bowel bleed, balloon endoscopy (old blood in prox ileum); post EGD - septic w/ L opacity, re-intubated for concern for aspiration, TTE (Mod MR, decrease biV w/ interventricular septum boweing towards R)   bronch    +C Diff    TC    CT C/A/P: Fluid filled colon which may be 2/2 rapid transit. Small bilateral pleffs with associates. Compressive atelectasis New ISABELLE & LLL  parenchymal opacities, suspicious for pneumonia. 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    #8 Shiley trach at bedside    CPAP trials    LVAD speed increased to 9200   Bronch; Central line dc'ed   TC since , continue as tolerated. Patient transferred to SDU.    Cont PT, no trach downsize today(#8cuffed)/ Anticoagulation/ Continue TF, speech f/u/ Vanco taper    oob to chair  INR  2.47  5 mg coumadin     increased lop to 25 q8  per HF   INR today 2.64.  H&H 7.3 this AM.  Will repeat CBC at noon, and will send stool guaiac Patient with persistent abdominal tenderness, rate of tube feeds decreased.  No nausea/vomiting.     INR today 2.4H&H 9.1/28.6 low flow overnight /N&V  NPO resolved for capsule study  Coumadin on hold refusing Tube feeds on D5 1/ normal  @50 cc/hr   INR 2.69  H&H 7..1 refusing Tube feeds on D5 1/2 normal  @50 cc/hr. This am + BM Anusha Oneill HF  aware- PRBC x1  GI team consulted -  NPO  plan on study in am-  D/w Dr Cadet Patient  to return to CTU for further management; 1PRBCS    Post op INR 2.2 today.  No bleeding. BC + for SM.  Pt is hypotensive requiring pressor and inotropic support.  ID follow up today on Cefepime will follow.   R PTC for PTX       Today:  1 melanotic BM overnight, continue IV Protonix drip. Plan for CT C/A/P today for further evaluation. Wean sedation with Propofol as tolerated. Continue CPAP/TC vent weaning as tolerated.     REVIEW OF SYSTEMS:  Unable to obtain secondary to pt being trached     ICU Vital Signs Last 24 Hrs  T(C): 36.4 (2021 08:00), Max: 38.7 (2021 00:30)  T(F): 97.5 (2021 08:00), Max: 101.7 (2021 00:30)  HR: 93 (2021 09:09) (0 - 122)  BP: --  BP(mean): --  ABP: 93/76 (2021 09:00) (74/59 - 119/83)  ABP(mean): 84 (2021 09:00) (64 - 94)  RR: 22 (2021 09:00) (17 - 71)  SpO2: 100% (2021 09:09) (73% - 100%)      Physical Exam:  Gen:  intubated   CNS: sedated 	  Neck: no JVD, +TC   RES : clear , no wheezing              CVS: +LVAD hum   Abd: Soft, +LLQ tender to palpation   Skin: No rash.  Ext:  no edema    ============================I/O===========================   I&O's Detail    2021 07:01  -  2021 07:00  --------------------------------------------------------  IN:    Albumin 5%  - 250 mL: 500 mL    DOBUTamine: 23.6 mL    DOBUTamine: 123.9 mL    Enteral Tube Flush: 30 mL    Insulin: 64 mL    IV PiggyBack: 650 mL    Miscellaneous Tube Feedin mL    Norepinephrine: 215 mL    Pantoprazole: 240 mL    Propofol: 496.4 mL    sodium chloride 0.9%: 1200 mL    Vasopressin: 141 mL  Total IN: 4333.9 mL    OUT:    Chest Tube (mL): 130 mL    Dexmedetomidine: 0 mL    Indwelling Catheter - Urethral (mL): 775 mL  Total OUT: 905 mL    Total NET: 3428.9 mL      2021 07:01  -  2021 10:21  --------------------------------------------------------  IN:    DOBUTamine: 11.8 mL    Insulin: 10 mL    Miscellaneous Tube Feedin mL    Norepinephrine: 8.8 mL    Pantoprazole: 20 mL    Propofol: 37.6 mL    sodium chloride 0.9%: 100 mL    Vasopressin: 12 mL  Total IN: 300.2 mL    OUT:    Chest Tube (mL): 0 mL    Dexmedetomidine: 0 mL    Indwelling Catheter - Urethral (mL): 110 mL  Total OUT: 110 mL    Total NET: 190.2 mL        ============================ LABS =========================                        8.6    17.87 )-----------( 130      ( 2021 00:32 )             26.3         129<L>  |  99  |  22  ----------------------------<  149<H>  4.0   |  19<L>  |  0.55    Ca    8.6      2021 00:32  Phos  1.9       Mg     1.8         TPro  6.2  /  Alb  3.1<L>  /  TBili  1.7<H>  /  DBili  x   /  AST  195<H>  /  ALT  157<H>  /  AlkPhos  131<H>      LIVER FUNCTIONS - ( 2021 00:32 )  Alb: 3.1 g/dL / Pro: 6.2 g/dL / ALK PHOS: 131 U/L / ALT: 157 U/L / AST: 195 U/L / GGT: x           PT/INR - ( 2021 14:23 )   PT: 25.8 sec;   INR: 2.23 ratio         PTT - ( 2021 11:11 )  PTT:35.5 sec  ABG - ( 2021 10:04 )  pH, Arterial: 7.39  pH, Blood: x     /  pCO2: 40    /  pO2: 182   / HCO3: 23    / Base Excess: -1.1  /  SaO2: 100                 ======================Micro/Rad/Cardio=================  Culture: Reviewed   CXR: Reviewed  Echo:Reviewed  ======================================================  PAST MEDICAL & SURGICAL HISTORY:  CHF (congestive heart failure)    CAD (coronary artery disease)    Depression    Pleural effusion    History of 2019 novel coronavirus disease (COVID-19)  2020    Hemorrhoids    Bleeding hemorrhoids    Peripheral arterial disease    Claudication    BPH with urinary obstruction    ACC/AHA stage D systolic heart failure    Anticoagulation goal of INR 2.0 to 2.5    Falls    Clavicle fracture    CKD (chronic kidney disease), stage III    Iron deficiency anemia    H/O epistaxis    Vertigo    GI bleed    S/P TVR (tricuspid valve repair)    S/P ventricular assist device    S/P endoscopy      ====================ASSESSMENT ==============  Stage D Nonischemic Cardiomyopathy, Status Post HM2 on 2017    Cardiogenic shock  Hemodynamic instability   Acute hypoxemic respiratory failure  GI bleed , Status Post Enteroscopy   Anemia, in setting of melena   Chronic Kidney Disease  Stress hyperglycemia   C.diff positive on    Hypovolemic shock  Hyponatremia   Leukocytosis    Plan:  ====================== NEUROLOGY=====================  Sedated with IV Precedex and IV Propofol, wean Propofol as tolerated   Tylenol PRN for analgesia   C/w clonazepam for agitation    acetaminophen    Suspension .. 650 milliGRAM(s) Oral every 6 hours PRN Mild Pain (1 - 3)  clonazePAM  Tablet 0.5 milliGRAM(s) Oral at bedtime  dexMEDEtomidine Infusion 1.5 MICROgram(s)/kG/Hr (29.4 mL/Hr) IV Continuous <Continuous>  propofol Infusion 25 MICROgram(s)/kG/Min (11.8 mL/Hr) IV Continuous <Continuous>    ==================== RESPIRATORY======================  Acute hypoxemic respiratory failure s/p #8 shiley trach on    Requiring full vent support, continue close monitoring of respiratory rate and breathing pattern, following of ABG’s with A-line monitoring, continuous pulse oximetry monitoring.   CPAP/TC vent weaning as tolerated   R PTC placed on  for mod R PTX, continue to monitor output.     Mechanical Ventilation:  Mode: AC/ CMV (Assist Control/ Continuous Mandatory Ventilation)  RR (machine): 16  TV (machine): 500  FiO2: 50  PEEP: 5  ITime: 1  MAP: 11  PIP: 23      ====================CARDIOVASCULAR==================  Stage D Nonischemic Cardiomyopathy, Status Post HM2 on 2017; LVAD settings 9200 with flow of 5.1   TTE : reveals at least moderate MR, mild AI, severe global LV syst dysfxn, dec RV fxn.  Continue invasive hemodynamic monitoring   Inotropic support with IV Dobutamine, maintain MAPs 70s-80s.   Maintain pressor support with IV Vasopressin, on and off IV Levophed   Rate control with amiodarone     aMIOdarone    Tablet 200 milliGRAM(s) Oral daily  DOBUTamine Infusion 2.5 MICROgram(s)/kG/Min (5.89 mL/Hr) IV Continuous <Continuous>  norepinephrine Infusion 0.05 MICROgram(s)/kG/Min (7.36 mL/Hr) IV Continuous <Continuous>  vasopressin Infusion 0.017 Unit(s)/Min (1 mL/Hr) IV Continuous <Continuous>    ===================HEMATOLOGIC/ONC ===================  Anemia due to acute blood loss associated with upper GI bleed     Continue to monitor H&H/Plts     ===================== RENAL =========================  Continue monitoring urine output, I&OS, BUN/Cr   Replete lytes PRN. Keep K> 4 and Mg >2   Diuresis with Lasix     furosemide   Injectable 10 milliGRAM(s) IV Push once    ==================== GASTROINTESTINAL===================  GI bleed in setting of recent melena, GI following - continue Protonix drip   1 melanotic BM overnight, continue IV Protonix drip. Plan for CT C/A/P today for further evaluation   Holding enteral feeds   C.diff + on , continue vanco   Simethicone PRN dyspepsia     magnesium sulfate  IVPB 1 Gram(s) IV Intermittent once  pantoprazole Infusion 8 mG/Hr (10 mL/Hr) IV Continuous <Continuous>  simethicone 80 milliGRAM(s) Chew every 8 hours PRN Dyspepsia    =======================    ENDOCRINE  =====================  Stress hyperglycemia, continue glucose control with IV insulin drip     insulin regular Infusion 3 Unit(s)/Hr (3 mL/Hr) IV Continuous <Continuous>    ========================INFECTIOUS DISEASE================  Afebrile, WBC rising 15.62->17.87   Monitor temperature and trend WBC   BCx+  +Serratia marcescens c/w meropenem   Plan for RUQ sono to eval for possible source of infection  C.diff + on , continue vanco   F/u repeat cultures     meropenem  IVPB 1000 milliGRAM(s) IV Intermittent every 8 hours  vancomycin    Solution 125 milliGRAM(s) Oral daily      Patient requires continuous monitoring with bedside rhythm monitoring, pulse ox monitoring, and intermittent blood gas analysis. Care plan discussed with ICU care team. Patient remained critical and at risk for life threatening decompensation.     By signing my name below, I, Agnieszka Cherry, attest that this documentation has been prepared under the direction and in the presence of CLAUDIA Alfaro   Electronically signed: Romel Shaffer, 21 @ 10:21    I, Bertrand Turk, personally performed the services described in this documentation. all medical record entries made by the romel were at my direction and in my presence. I have reviewed the chart and agree that the record reflects my personal performance and is accurate and complete  Electronically signed: CLAUDIA Alfaro

## 2021-07-29 NOTE — PROGRESS NOTE ADULT - ATTENDING COMMENTS
Agree with above. Patient septic and hypotensive on pressors - would follow-up IR for possible perc drain. Give unit of PRBC and follow-up repeat - if able to stabilize, will consider enteroscopy.

## 2021-07-29 NOTE — PROGRESS NOTE ADULT - SUBJECTIVE AND OBJECTIVE BOX
INFECTIOUS DISEASE FOLLOW UP NOTE:    Interval History/ROS: Patient is a 66y old  Male who presents with a chief complaint of Anemia, Supratherapeutic INR, Dark Stools (28 Jul 2021 19:53)    Overnight events: Patient still had episodes of fever overnight, but hemodynamically stable. Not on pressors anymore. No vent changes from yesterday.      REVIEW OF SYSTEMS:  Unable to obtain as patient is sedated    Prior hospital charts reviewed [Yes]  Primary team notes reviewed [Yes]  Other consultant notes reviewed [Yes]    Allergies:  No Known Allergies      ANTIMICROBIALS:   meropenem  IVPB 1000 every 8 hours  meropenem  IVPB    vancomycin    Solution 125 daily      OTHER MEDS: MEDICATIONS  (STANDING):  acetaminophen    Suspension .. 650 every 6 hours PRN  aMIOdarone    Tablet 200 daily  clonazePAM  Tablet 0.5 at bedtime  dexMEDEtomidine Infusion 0.5 <Continuous>  DOBUTamine Infusion 2.5 <Continuous>  insulin regular Infusion 3 <Continuous>  pantoprazole Infusion 8 <Continuous>  simethicone 80 every 8 hours PRN  vasopressin Infusion 0.017 <Continuous>      Vital Signs Last 24 Hrs  T(F): 99.3 (07-29-21 @ 04:00), Max: 105.8 (07-27-21 @ 00:00)    Vital Signs Last 24 Hrs  HR: 122 (07-29-21 @ 07:30) (0 - 122)  BP: --  RR: 47 (07-29-21 @ 07:30)  SpO2: 100% (07-29-21 @ 07:30) (80% - 100%)  Wt(kg): --    EXAM:  GENERAL: Sedated  HEAD: Atraumatic, Normocephalic  EYES: EOMI, PERRLA, conjunctiva pink and cornea white  ENT: Normal external ears and nose, no discharges; moist mucous membranes, no erythema on posterior oropharynx; no oral thrush  NECK: Supple, nontender to palpation; no JVD; trach in place, vented  CHEST/LUNG: Good air entry bilaterally; right chest tube in place  HEART: Regular rate and rhythm; LVAD mechanical sound heard on auscultation  ABDOMEN: Soft, nondistended; no rebound tenderness, no guarding; no hepatomegaly; normoactive bowel sounds; LVAD exit site looks clean and without erythema  EXTREMITIES: extremities cold to touch, 2+ Peripheral Pulses, brisk capillary refill. No clubbing, cyanosis, or petal edema  NERVOUS SYSTEM: Sedated  MSK: Sedated; no joint erythema, swelling or pain  SKIN: No superficial thrombophlebitis; RIJ central line site clean      Labs:                        8.0    12.47 )-----------( 100      ( 29 Jul 2021 06:10 )             24.6     07-29    130<L>  |  99  |  17  ----------------------------<  136<H>  4.0   |  23  |  0.57    Ca    8.4      29 Jul 2021 00:28  Phos  0.9     07-29  Mg     2.2     07-29    TPro  6.0  /  Alb  2.9<L>  /  TBili  0.9  /  DBili  x   /  AST  209<H>  /  ALT  237<H>  /  AlkPhos  171<H>  07-29      WBC Trend:  WBC Count: 12.47 (07-29-21 @ 06:10)  WBC Count: 11.78 (07-29-21 @ 00:28)  WBC Count: 12.84 (07-28-21 @ 20:55)  WBC Count: 14.01 (07-28-21 @ 17:13)      Creatine Trend:  Creatinine, Serum: 0.57 (07-29)  Creatinine, Serum: 0.55 (07-28)  Creatinine, Serum: 1.06 (07-27)  Creatinine, Serum: 0.76 (07-26)      Liver Biochemical Testing Trend:  Alanine Aminotransferase (ALT/SGPT): 237 *H* (07-29)  Alanine Aminotransferase (ALT/SGPT): 157 *H* (07-28)  Alanine Aminotransferase (ALT/SGPT): 48 *H* (07-27)  Alanine Aminotransferase (ALT/SGPT): 15 (07-26)  Alanine Aminotransferase (ALT/SGPT): 16 (07-26)  Aspartate Aminotransferase (AST/SGOT): 209 (07-29-21 @ 00:28)  Aspartate Aminotransferase (AST/SGOT): 195 (07-28-21 @ 00:32)  Aspartate Aminotransferase (AST/SGOT): 68 (07-27-21 @ 00:34)  Aspartate Aminotransferase (AST/SGOT): 18 (07-26-21 @ 05:58)  Aspartate Aminotransferase (AST/SGOT): 21 (07-26-21 @ 00:50)  Bilirubin Total, Serum: 0.9 (07-29)  Bilirubin Total, Serum: 1.7 (07-28)  Bilirubin Total, Serum: 1.1 (07-27)  Bilirubin Total, Serum: 0.6 (07-26)  Bilirubin Total, Serum: 0.5 (07-26)      Trend LDH  07-28-21 @ 00:32  430<H>  07-27-21 @ 00:34  300<H>  07-26-21 @ 07:29  240  07-26-21 @ 05:58  242  07-26-21 @ 00:50  298<H>          MICROBIOLOGY:    MRSA PCR Result.: NotDetec (06-14-21 @ 23:02)      Culture - Blood (collected 27 Jul 2021 19:10)  Source: .Blood Blood  Preliminary Report:    No growth to date.    Culture - Blood (collected 27 Jul 2021 19:10)  Source: .Blood Blood  Preliminary Report:    No growth to date.    Culture - Sputum (collected 27 Jul 2021 19:08)  Source: .Sputum Sputum  Preliminary Report:    Moderate Serratia marcescens    Normal Respiratory Bria present    Culture - Blood (collected 26 Jul 2021 22:59)  Source: .Blood Blood-Peripheral  Final Report:    Growth in aerobic and anaerobic bottles: Serratia marcescens    See previous culture  [id number]    Culture - Blood (collected 26 Jul 2021 09:12)  Source: .Blood Blood-Peripheral  Final Report:    Growth in aerobic and anaerobic bottles: Serratia marcescens    ***Blood Panel PCR results on this specimen are available    approximately 3 hours after the Gram stain result.***    Gram stain, PCR, and/or culture results may not always    correspond due to difference in methodologies.    ************************************************************    This PCR assay was performed by multiplex PCR. This    Assay tests for 66 bacterial and resistance gene targets.    Please refer to the [facility] test directory    at [email or web address] for details.  Organism: Blood Culture PCR  Serratia marcescens  Organism: Serratia marcescens    Sensitivities:      -  Amikacin: S <=16      -  Ampicillin: R >16 These ampicillin results predict results for amoxicillin      -  Ampicillin/Sulbactam: R >16/8 Enterobacter, Citrobacter, and Serratia may develop resistance during prolonged therapy (3-4 days)      -  Aztreonam: S <=4      -  Cefazolin: R >16 Enterobacter, Citrobacter, and Serratia may develop resistance during prolonged therapy (3-4 days)      -  Cefepime: S <=2      -  Cefoxitin: R <=8      -  Ceftriaxone: S <=1 Enterobacter, Citrobacter, and Serratia may develop resistance during prolonged therapy      -  Ciprofloxacin: S <=0.25      -  Ertapenem: S <=0.5      -  Gentamicin: S <=2      -  Levofloxacin: S <=0.5      -  Meropenem: S <=1      -  Piperacillin/Tazobactam: S <=8      -  Tobramycin: S 4      -  Trimethoprim/Sulfamethoxazole: S <=0.5/9.5      Method Type: CLAU  Organism: Blood Culture PCR    Sensitivities:      -  Serratia marcescens: Detec      Method Type: PCR    Culture - Blood (collected 14 Jul 2021 22:13)  Source: .Blood Blood  Final Report:    No Growth Final    Culture - Blood (collected 14 Jul 2021 22:13)  Source: .Blood Blood  Final Report:    No Growth Final    Culture - Blood (collected 09 Jul 2021 17:39)  Source: .Blood Blood-Venous  Final Report:    No Growth Final    Culture - Sputum (collected 07 Jul 2021 11:47)  Source: .Sputum Sputum  Final Report:    Moderate Citrobacter freundii    Moderate Serratia marcescens    Normal Respiratory Bria present  Organism: Citrobacter freundii  Serratia marcescens  Organism: Serratia marcescens    Sensitivities:      -  Amikacin: S <=16      -  Amoxicillin/Clavulanic Acid: R >16/8      -  Ampicillin: R 16 These ampicillin results predict results for amoxicillin      -  Ampicillin/Sulbactam: R 16/8 Enterobacter, Citrobacter, and Serratia may develop resistance during prolonged therapy (3-4 days)      -  Aztreonam: S <=4      -  Cefazolin: R >16 Enterobacter, Citrobacter, and Serratia may develop resistance during prolonged therapy (3-4 days)      -  Cefepime: S <=2      -  Cefoxitin: R <=8      -  Ceftriaxone: S <=1 Enterobacter, Citrobacter, and Serratia may develop resistance during prolonged therapy      -  Ciprofloxacin: S <=0.25      -  Ertapenem: S <=0.5      -  Gentamicin: S <=2      -  Levofloxacin: S <=0.5      -  Meropenem: S <=1      -  Piperacillin/Tazobactam: S <=8      -  Tobramycin: S 4      -  Trimethoprim/Sulfamethoxazole: S <=0.5/9.5      Method Type: CLAU  Organism: Citrobacter freundii    Sensitivities:      -  Amikacin: S <=16      -  Amoxicillin/Clavulanic Acid: R >16/8      -  Ampicillin: R >16 These ampicillin results predict results for amoxicillin      -  Ampicillin/Sulbactam: R >16/8 Enterobacter, Citrobacter, and Serratia may develop resistance during prolonged therapy (3-4 days)      -  Aztreonam: I 16      -  Cefazolin: R >16 Enterobacter, Citrobacter, and Serratia may develop resistance during prolonged therapy (3-4 days)      -  Cefepime: S <=2      -  Cefoxitin: R >16      -  Ceftriaxone: R 32 Enterobacter, Citrobacter, and Serratia may develop resistance during prolonged therapy      -  Ciprofloxacin: S <=0.25      -  Ertapenem: S <=0.5      -  Gentamicin: S <=2      -  Imipenem: S <=1      -  Levofloxacin: S <=0.5      -  Meropenem: S <=1      -  Piperacillin/Tazobactam: S <=8      -  Tobramycin: S <=2      -  Trimethoprim/Sulfamethoxazole: S <=0.5/9.5      Method Type: CLAU    Culture - Sputum (collected 30 Jun 2021 20:57)  Source: .Sputum Sputum  Final Report:    Normal Respiratory Bria present      HIV-1/2 Combo Result: Nonreact (01-14-21 @ 08:18)    ABS CD4: 129 /uL (04-07-20 @ 08:38)      COVID-19 PCR: NotDetec (06-14-21 @ 12:24)    COVID-19 Le[place] Domain AB Interp: Positive (06-15-21 @ 10:15)  COVID-19 IgG Antibody Interpretation: Positive (01-14-21 @ 08:55)  COVID-19 IgG Antibody Interpretation: Positive (09-08-20 @ 08:50)    Lactate Dehydrogenase, Serum: 430 (07-28)  Lactate Dehydrogenase, Serum: 300 (07-27)  Lactate Dehydrogenase, Serum: 240 (07-26)  Lactate Dehydrogenase, Serum: 242 (07-26)  Lactate Dehydrogenase, Serum: 298 (07-26)  Lactate Dehydrogenase, Serum: 237 (07-25)  Lactate Dehydrogenase, Serum: 260 (07-24)  Lactate Dehydrogenase, Serum: 234 (07-23)    Blood Gas Arterial, Lactate: 1.0 (07-29 @ 00:28)  Blood Gas Arterial, Lactate: 1.5 (07-28 @ 15:02)  Blood Gas Arterial, Lactate: 1.0 (07-28 @ 10:04)  Blood Gas Arterial, Lactate: 1.3 (07-28 @ 00:23)      RADIOLOGY:  imaging below personally reviewed    < from: CT Abdomen and Pelvis w/ IV Cont (07.28.21 @ 17:44) >  INTERPRETATION:  Trace bilateral plerual effusions.  Subcutaneous emphysema in the right chest wall and right sided pigtail catheter.  Consolidation in the left lower lobe. Ground glass opacities in the right upper lobe, new.  Partial thrombosis of the LVAD Outflow tract, unchanged.  Small ascites, increased since 6/22/2021.      < from: CT Head No Cont (07.28.21 @ 17:34) >  FINDINGS:    The ventricular and sulcal size and configuration is age appropriate.   There is no acute loss of gray-white differentiation. There are moderate patchy areas of hypodensity in the periventricular and subcortical white matter which are likely related to chronic microangiopathic changes.    There is no evidence of mass effect, midline shift, acute intracranial hemorrhage, or extra-axial collections.     The calvarium is intact. The paranasal sinuses are clear.The mastoid air cells are predominantly clear. Prior left cataract surgery.      IMPRESSION:  No acute intracranial hemorrhage or acute territorial infarction.        < from: US Abdomen Upper Quadrant Right (07.28.21 @ 16:00) >  FINDINGS:    Liver: Within normal limits.  Bile ducts: Normal caliber. Common bile duct measures 3 mm.  Gallbladder: Biliary sludge and mild pericholecystic fluid. Edematous wall measuring up to 5 mm. Unable to assess sonographic Aguila's sign due to mental status.  Pancreas: Poorly visualized.  Right kidney: 11.4 cm. No hydronephrosis. A questionable cyst in the lower pole measures up to 1.0 cm.  Ascites: Mild ascites, with the largest visualized pocket in the right upper quadrant. Small amount is seen in both lower quadrants.  IVC: Poorly visualized.    IMPRESSION:    Gallbladder wall thickening and pericholecystic fluid, favored to be secondary to ascites in the setting of heart failure. However, if there is continued concern for acute cholecystitis further evaluation with nuclear medicine hepatobiliary scan can be considered.    --- End of Report ---     INFECTIOUS DISEASE FOLLOW UP NOTE:    Interval History/ROS: Patient is a 66y old  Male who presents with a chief complaint of Anemia, Supratherapeutic INR, Dark Stools (28 Jul 2021 19:53)    Overnight events: Patient still had episodes of fever overnight, but hemodynamically stable. Patient is awake and able answer simple questions. Not on pressors anymore. FiO2 down to 40%, satting at 100%.       REVIEW OF SYSTEMS:  Unable to obtain as patient is sedated    Prior hospital charts reviewed [Yes]  Primary team notes reviewed [Yes]  Other consultant notes reviewed [Yes]    Allergies:  No Known Allergies      ANTIMICROBIALS:   meropenem  IVPB 1000 every 8 hours  meropenem  IVPB    vancomycin    Solution 125 daily      OTHER MEDS: MEDICATIONS  (STANDING):  acetaminophen    Suspension .. 650 every 6 hours PRN  aMIOdarone    Tablet 200 daily  clonazePAM  Tablet 0.5 at bedtime  dexMEDEtomidine Infusion 0.5 <Continuous>  DOBUTamine Infusion 2.5 <Continuous>  insulin regular Infusion 3 <Continuous>  pantoprazole Infusion 8 <Continuous>  simethicone 80 every 8 hours PRN  vasopressin Infusion 0.017 <Continuous>      Vital Signs Last 24 Hrs  T(F): 99.3 (07-29-21 @ 04:00), Max: 105.8 (07-27-21 @ 00:00)    Vital Signs Last 24 Hrs  HR: 122 (07-29-21 @ 07:30) (0 - 122)  RR: 47 (07-29-21 @ 07:30)  SpO2: 100% (07-29-21 @ 07:30) (80% - 100%)    EXAM:  GENERAL: Unable to speak as he is trached; ill-looking  HEAD: Atraumatic, Normocephalic  EYES: EOMI, PERRLA, conjunctiva pink and cornea white  ENT: Normal external ears and nose, no discharges; moist mucous membranes, no erythema on posterior oropharynx; NGT in place, no oral thrush  NECK: Supple, nontender to palpation; no JVD; trach in place, vented  CHEST/LUNG: Good air entry bilaterally; right chest tube in place  HEART: Regular rate and rhythm; LVAD mechanical sound heard on auscultation  ABDOMEN: Soft, nondistended; no rebound tenderness, no guarding; no hepatomegaly; normoactive bowel sounds; LVAD exit site looks clean and without erythema  EXTREMITIES: extremities cold to touch, 2+ Peripheral Pulses, brisk capillary refill. No clubbing, cyanosis, or petal edema  NERVOUS SYSTEM: Sedated  MSK: Sedated; no joint erythema, swelling or pain  SKIN: Superficial thrombophlebitis on right UE; RIJ central line site clean    Labs:                        8.0    12.47 )-----------( 100      ( 29 Jul 2021 06:10 )             24.6     07-29    130<L>  |  99  |  17  ----------------------------<  136<H>  4.0   |  23  |  0.57    Ca    8.4      29 Jul 2021 00:28  Phos  0.9     07-29  Mg     2.2     07-29    TPro  6.0  /  Alb  2.9<L>  /  TBili  0.9  /  DBili  x   /  AST  209<H>  /  ALT  237<H>  /  AlkPhos  171<H>  07-29      WBC Trend:  WBC Count: 12.47 (07-29-21 @ 06:10)  WBC Count: 11.78 (07-29-21 @ 00:28)  WBC Count: 12.84 (07-28-21 @ 20:55)  WBC Count: 14.01 (07-28-21 @ 17:13)      Creatine Trend:  Creatinine, Serum: 0.57 (07-29)  Creatinine, Serum: 0.55 (07-28)  Creatinine, Serum: 1.06 (07-27)  Creatinine, Serum: 0.76 (07-26)      Liver Biochemical Testing Trend:  Alanine Aminotransferase (ALT/SGPT): 237 *H* (07-29)  Alanine Aminotransferase (ALT/SGPT): 157 *H* (07-28)  Alanine Aminotransferase (ALT/SGPT): 48 *H* (07-27)  Alanine Aminotransferase (ALT/SGPT): 15 (07-26)  Alanine Aminotransferase (ALT/SGPT): 16 (07-26)  Aspartate Aminotransferase (AST/SGOT): 209 (07-29-21 @ 00:28)  Aspartate Aminotransferase (AST/SGOT): 195 (07-28-21 @ 00:32)  Aspartate Aminotransferase (AST/SGOT): 68 (07-27-21 @ 00:34)  Aspartate Aminotransferase (AST/SGOT): 18 (07-26-21 @ 05:58)  Aspartate Aminotransferase (AST/SGOT): 21 (07-26-21 @ 00:50)  Bilirubin Total, Serum: 0.9 (07-29)  Bilirubin Total, Serum: 1.7 (07-28)  Bilirubin Total, Serum: 1.1 (07-27)  Bilirubin Total, Serum: 0.6 (07-26)  Bilirubin Total, Serum: 0.5 (07-26)      Trend LDH  07-28-21 @ 00:32  430<H>  07-27-21 @ 00:34  300<H>  07-26-21 @ 07:29  240  07-26-21 @ 05:58  242  07-26-21 @ 00:50  298<H>      MICROBIOLOGY:    MRSA PCR Result.: Lyndseytec (06-14-21 @ 23:02)      Culture - Blood (collected 27 Jul 2021 19:10)  Source: .Blood Blood  Preliminary Report:    No growth to date.    Culture - Blood (collected 27 Jul 2021 19:10)  Source: .Blood Blood  Preliminary Report:    No growth to date.    Culture - Sputum (collected 27 Jul 2021 19:08)  Source: .Sputum Sputum  Preliminary Report:    Moderate Serratia marcescens    Normal Respiratory Bria present    Culture - Blood (collected 26 Jul 2021 22:59)  Source: .Blood Blood-Peripheral  Final Report:    Growth in aerobic and anaerobic bottles: Serratia marcescens    See previous culture  [id number]    Culture - Blood (collected 26 Jul 2021 09:12)  Source: .Blood Blood-Peripheral  Final Report:    Growth in aerobic and anaerobic bottles: Serratia marcescens    ***Blood Panel PCR results on this specimen are available    approximately 3 hours after the Gram stain result.***    Gram stain, PCR, and/or culture results may not always    correspond due to difference in methodologies.    ************************************************************    This PCR assay was performed by multiplex PCR. This    Assay tests for 66 bacterial and resistance gene targets.    Please refer to the [facility] test directory    at [email or web address] for details.  Organism: Blood Culture PCR  Serratia marcescens  Organism: Serratia marcescens    Sensitivities:      -  Amikacin: S <=16      -  Ampicillin: R >16 These ampicillin results predict results for amoxicillin      -  Ampicillin/Sulbactam: R >16/8 Enterobacter, Citrobacter, and Serratia may develop resistance during prolonged therapy (3-4 days)      -  Aztreonam: S <=4      -  Cefazolin: R >16 Enterobacter, Citrobacter, and Serratia may develop resistance during prolonged therapy (3-4 days)      -  Cefepime: S <=2      -  Cefoxitin: R <=8      -  Ceftriaxone: S <=1 Enterobacter, Citrobacter, and Serratia may develop resistance during prolonged therapy      -  Ciprofloxacin: S <=0.25      -  Ertapenem: S <=0.5      -  Gentamicin: S <=2      -  Levofloxacin: S <=0.5      -  Meropenem: S <=1      -  Piperacillin/Tazobactam: S <=8      -  Tobramycin: S 4      -  Trimethoprim/Sulfamethoxazole: S <=0.5/9.5      Method Type: CLAU  Organism: Blood Culture PCR    Sensitivities:      -  Serratia marcescens: Detec      Method Type: PCR    Culture - Blood (collected 14 Jul 2021 22:13)  Source: .Blood Blood  Final Report:    No Growth Final    Culture - Blood (collected 14 Jul 2021 22:13)  Source: .Blood Blood  Final Report:    No Growth Final    Culture - Blood (collected 09 Jul 2021 17:39)  Source: .Blood Blood-Venous  Final Report:    No Growth Final    Culture - Sputum (collected 07 Jul 2021 11:47)  Source: .Sputum Sputum  Final Report:    Moderate Citrobacter freundii    Moderate Serratia marcescens    Normal Respiratory Bria present  Organism: Citrobacter freundii  Serratia marcescens  Organism: Serratia marcescens    Sensitivities:      -  Amikacin: S <=16      -  Amoxicillin/Clavulanic Acid: R >16/8      -  Ampicillin: R 16 These ampicillin results predict results for amoxicillin      -  Ampicillin/Sulbactam: R 16/8 Enterobacter, Citrobacter, and Serratia may develop resistance during prolonged therapy (3-4 days)      -  Aztreonam: S <=4      -  Cefazolin: R >16 Enterobacter, Citrobacter, and Serratia may develop resistance during prolonged therapy (3-4 days)      -  Cefepime: S <=2      -  Cefoxitin: R <=8      -  Ceftriaxone: S <=1 Enterobacter, Citrobacter, and Serratia may develop resistance during prolonged therapy      -  Ciprofloxacin: S <=0.25      -  Ertapenem: S <=0.5      -  Gentamicin: S <=2      -  Levofloxacin: S <=0.5      -  Meropenem: S <=1      -  Piperacillin/Tazobactam: S <=8      -  Tobramycin: S 4      -  Trimethoprim/Sulfamethoxazole: S <=0.5/9.5      Method Type: CLAU  Organism: Citrobacter freundii    Sensitivities:      -  Amikacin: S <=16      -  Amoxicillin/Clavulanic Acid: R >16/8      -  Ampicillin: R >16 These ampicillin results predict results for amoxicillin      -  Ampicillin/Sulbactam: R >16/8 Enterobacter, Citrobacter, and Serratia may develop resistance during prolonged therapy (3-4 days)      -  Aztreonam: I 16      -  Cefazolin: R >16 Enterobacter, Citrobacter, and Serratia may develop resistance during prolonged therapy (3-4 days)      -  Cefepime: S <=2      -  Cefoxitin: R >16      -  Ceftriaxone: R 32 Enterobacter, Citrobacter, and Serratia may develop resistance during prolonged therapy      -  Ciprofloxacin: S <=0.25      -  Ertapenem: S <=0.5      -  Gentamicin: S <=2      -  Imipenem: S <=1      -  Levofloxacin: S <=0.5      -  Meropenem: S <=1      -  Piperacillin/Tazobactam: S <=8      -  Tobramycin: S <=2      -  Trimethoprim/Sulfamethoxazole: S <=0.5/9.5      Method Type: CLAU    Culture - Sputum (collected 30 Jun 2021 20:57)  Source: .Sputum Sputum  Final Report:    Normal Respiratory Bria present      HIV-1/2 Combo Result: Nonreact (01-14-21 @ 08:18)    ABS CD4: 129 /uL (04-07-20 @ 08:38)      COVID-19 PCR: NotDetec (06-14-21 @ 12:24)    COVID-19 Le[place] Domain AB Interp: Positive (06-15-21 @ 10:15)  COVID-19 IgG Antibody Interpretation: Positive (01-14-21 @ 08:55)  COVID-19 IgG Antibody Interpretation: Positive (09-08-20 @ 08:50)    Lactate Dehydrogenase, Serum: 430 (07-28)  Lactate Dehydrogenase, Serum: 300 (07-27)  Lactate Dehydrogenase, Serum: 240 (07-26)  Lactate Dehydrogenase, Serum: 242 (07-26)  Lactate Dehydrogenase, Serum: 298 (07-26)  Lactate Dehydrogenase, Serum: 237 (07-25)  Lactate Dehydrogenase, Serum: 260 (07-24)  Lactate Dehydrogenase, Serum: 234 (07-23)    Blood Gas Arterial, Lactate: 1.0 (07-29 @ 00:28)  Blood Gas Arterial, Lactate: 1.5 (07-28 @ 15:02)  Blood Gas Arterial, Lactate: 1.0 (07-28 @ 10:04)  Blood Gas Arterial, Lactate: 1.3 (07-28 @ 00:23)      RADIOLOGY:  imaging below personally reviewed    < from: CT Abdomen and Pelvis w/ IV Cont (07.28.21 @ 17:44) >  INTERPRETATION:  Trace bilateral plerual effusions.  Subcutaneous emphysema in the right chest wall and right sided pigtail catheter.  Consolidation in the left lower lobe. Ground glass opacities in the right upper lobe, new.  Partial thrombosis of the LVAD Outflow tract, unchanged.  Small ascites, increased since 6/22/2021.      < from: CT Head No Cont (07.28.21 @ 17:34) >  FINDINGS:    The ventricular and sulcal size and configuration is age appropriate.   There is no acute loss of gray-white differentiation. There are moderate patchy areas of hypodensity in the periventricular and subcortical white matter which are likely related to chronic microangiopathic changes.    There is no evidence of mass effect, midline shift, acute intracranial hemorrhage, or extra-axial collections.     The calvarium is intact. The paranasal sinuses are clear.The mastoid air cells are predominantly clear. Prior left cataract surgery.      IMPRESSION:  No acute intracranial hemorrhage or acute territorial infarction.        < from: US Abdomen Upper Quadrant Right (07.28.21 @ 16:00) >  FINDINGS:    Liver: Within normal limits.  Bile ducts: Normal caliber. Common bile duct measures 3 mm.  Gallbladder: Biliary sludge and mild pericholecystic fluid. Edematous wall measuring up to 5 mm. Unable to assess sonographic Aguila's sign due to mental status.  Pancreas: Poorly visualized.  Right kidney: 11.4 cm. No hydronephrosis. A questionable cyst in the lower pole measures up to 1.0 cm.  Ascites: Mild ascites, with the largest visualized pocket in the right upper quadrant. Small amount is seen in both lower quadrants.  IVC: Poorly visualized.    IMPRESSION:    Gallbladder wall thickening and pericholecystic fluid, favored to be secondary to ascites in the setting of heart failure. However, if there is continued concern for acute cholecystitis further evaluation with nuclear medicine hepatobiliary scan can be considered.    --- End of Report ---

## 2021-07-29 NOTE — CHART NOTE - NSCHARTNOTEFT_GEN_A_CORE
Seen for: nutrition follow up   Source: EMR, RN, HF rounds    Chart reviewed, events noted. Per chart: 65 year old male with PMHx of NICM, s/p  HM2 LVAD in , on coumadin, CKD 3 presents with anemia, dark stools and supra therapeutic INR. S/p capsule study, endoscopy. Course c/b possible aspiration event. C-Diff +, being treated with vancomycin. S/p tracheostomy on . On  had episode of melena associated with Hb drop, GI consulted. Plan for possible endoscopically placed capsule study as condition allows. Blood cultures positive for SM, required pressor support on . On , US showed GB wall thickening with surrounding pericholecystic fluid, plan for percuatneous cholecystostomy with IR. Back on full vent support    Diet, NPO with Tube Feed: Jevity 1.2 @ 60 ml/hr x 24 hours + Probiotic smoothie 2x/day     EN Order Provides: 1728 kcal, 80 g protein   (meeting 22 kcal/kg, 1.02 g/kg protein using dosing weight 78.5 kg)    EN provision per chart:   () infusing at goal rate, tolerating per RN  () 76% of goal, met goal rate  () 25% of goal, infusing at lower rate 2/2 abdominal pain  () held, pt refused in setting of abdominal pain  () held  () 12% of goal, held after episode of vomiting  () 54% of goal   () 46% of goal   () 100% of goal  () 100% of goal    GI:    - C. Diff + (); remains on vancomycin   - Formula changed from Vital AF to Jevity 1.2 on  to provide more fiber to aid in stool bulking, Metamucil was also added at this time but now discontinued  - Last emesis: last on   - Last bowel movement: , x4, "creamy", Melena per RN  - Per flowsheets, abdomen firm, distended  - Transaminitis,  ultrasound showing GB wall thickening w/ surrounding pericholecystic fluid  - Triglycerides, Serum: 679 mg/dL (21 @ 13:17)  - Started on octreotide     Weight history:   - Admission weight: 176.3 pounds / 80 kg (6/15)  - Recent standing weight: 148.1 pounds / 67.2 kg ()     ** 28.2 pound/16% weight loss in 1 month    Weights:   Daily Weight in k.8 (), Weight in k (), Weight in k.1 (), Weight in k.7 (), Weight in k.2 (), Weight in k (), Weight in k.2 ()    MEDICATIONS  (STANDING):  aMIOdarone    Tablet 200 milliGRAM(s) Oral daily  cefepime   IVPB 1000 milliGRAM(s) IV Intermittent every 8 hours  chlorhexidine 0.12% Liquid 15 milliLiter(s) Oral Mucosa every 12 hours  chlorhexidine 2% Cloths 1 Application(s) Topical <User Schedule>  clonazePAM  Tablet 0.5 milliGRAM(s) Oral at bedtime  dexMEDEtomidine Infusion 0.5 MICROgram(s)/kG/Hr (9.81 mL/Hr) IV Continuous <Continuous>  DOBUTamine Infusion 2.5 MICROgram(s)/kG/Min (5.89 mL/Hr) IV Continuous <Continuous>  insulin regular Infusion 3 Unit(s)/Hr (3 mL/Hr) IV Continuous <Continuous>  metroNIDAZOLE  IVPB 500 milliGRAM(s) IV Intermittent every 8 hours  octreotide  Injectable 50 MICROGram(s) IV Push every 8 hours  pantoprazole Infusion 8 mG/Hr (10 mL/Hr) IV Continuous <Continuous>  sodium chloride 0.9%. 1000 milliLiter(s) (5 mL/Hr) IV Continuous <Continuous>  vancomycin    Solution 125 milliGRAM(s) Oral every 12 hours  vasopressin Infusion 0.017 Unit(s)/Min (1 mL/Hr) IV Continuous <Continuous>    MEDICATIONS  (PRN):  acetaminophen    Suspension .. 650 milliGRAM(s) Oral every 6 hours PRN Mild Pain (1 - 3)  simethicone 80 milliGRAM(s) Chew every 8 hours PRN Dyspepsia      Pertinent Labs:  @ 00:28: Na 130<L>, BUN 17, Cr 0.57, <H>, K+ 4.0, Phos 0.9<LL>, Mg 2.2, Alk Phos 171<H>, ALT/SGPT 237<H>, AST/SGOT 209<H>, HbA1c --    A1C with Estimated Average Glucose Result: 5.6 % (06-15-21 @ 02:42)    Finger Sticks:  POCT Blood Glucose.: 114 mg/dL ( @ 13:12)  POCT Blood Glucose.: 98 mg/dL ( @ 12:14)  POCT Blood Glucose.: 77 mg/dL ( @ 11:35)  POCT Blood Glucose.: 83 mg/dL ( @ 11:09)  POCT Blood Glucose.: 80 mg/dL ( @ 11:07)  POCT Blood Glucose.: 140 mg/dL ( @ 10:04)  POCT Blood Glucose.: 138 mg/dL ( @ 08:56)  POCT Blood Glucose.: 121 mg/dL ( @ 08:00)  POCT Blood Glucose.: 102 mg/dL ( @ 07:15)  POCT Blood Glucose.: 131 mg/dL ( @ 05:55)  POCT Blood Glucose.: 124 mg/dL ( @ 03:52)  POCT Blood Glucose.: 154 mg/dL ( @ 03:20)  POCT Blood Glucose.: 119 mg/dL ( @ 01:54)  POCT Blood Glucose.: 111 mg/dL ( @ 01:10)  POCT Blood Glucose.: 148 mg/dL ( @ 23:08)  POCT Blood Glucose.: 159 mg/dL ( @ 21:54)  POCT Blood Glucose.: 155 mg/dL ( @ 21:23)  POCT Blood Glucose.: 157 mg/dL ( @ 20:15)  POCT Blood Glucose.: 138 mg/dL ( @ 18:50)  POCT Blood Glucose.: 107 mg/dL ( @ 16:31)  POCT Blood Glucose.: 141 mg/dL ( @ 14:06)    Skin per chart: no pressure injuries  Edema per chart: none noted    Estimated Nutrition Needs:   - Using dosing weight 78.5 kg, considering vent support  - Energy Needs (25-30 kcal/kg): 9833-7550 kcal/day  - Protein Needs (1.2-1.4 g/kg):     Previous Nutrition Diagnosis: Severe chronic malnutrition  Nutrition diagnosis is ongoing & addressed with tube feeds as tolerated    No new nutrition diagnosis at this time       Recommendations:      1) As tolerated, add No Carb Prosource 2x/day to meet 1.3 g/kg protein/day - continue Jevity 1.2 @ 60ml/hr x24hr    Continue monitoring and evaluating:   - Labs: blood glucose, electrolytes, renal indices  - GI function and bowel movements  - Nutritional intake, weight trends, skin integrity    RD remains available PRN and will follow up per protocol.   Kandice Peña RD, CDN, [facility]   Pager # [phone]

## 2021-07-29 NOTE — PROGRESS NOTE ADULT - ASSESSMENT
66 years old male with a history of stage D NICM s/p HM2 on 9/2017 as DT (due to severe PAD) with TV ring, prior COVID-19 infection 4/2020, recurrent syncope post LVAD s/p ILR, admitted on 6/14/21with symptomatic anemia with Hgb 4.5 in setting of INR 8.8 without hemodynamic instability. He was transfused and underwent VCE which showed active bleeding in the mid small bowel but subsequent enteroscopy 6/15 did not reveal any active bleeding. He acutely decompensated after procedure with fever/hypertension, low flow alarms, and pulmonary infiltrate with hypoxia requiring intubation from probable aspiration PNA. Course notable for inability to wean ventilator with persistent secretions for which he underwent tracheostomy. He developed C. diff colitis and is on vancomycin taper. He is currently vent dependent now and requiring pressor support. BCx showed serratia on 7/26, which correlates to his sputum culture on 7/7 and 7/27. The source is most likely from pneumonia. Transaminitis could be secondary to acalculous cholecystitis. Serratia is unlikely to cause device infection (gram negative organism) but if his blood culture is persistently positive, patient might need further evaluation on possible device infection.      IMPRESSION:  Septic shock secondary to high grade Serratia bacteremia, secondary to pneumonia  Tension pneumothorax s/p right chest tube 7/26  Transaminitis, cholestatic pattern, likely acalculous cholecystitis  C. diff colitis on PO vancomycin taper  ICM s/p LVAD  BCx serratia 7/26, NGTD 7/27  Sputum culture serratia 7/7, 7/27  CT consolidation on left lower lobe  No cholangitis  No signs of drive line infection      RECOMMENDATIONS:  - Continue IV meropenem 1gm q8hrs to cover serratia and other intra-abdominal organisms, can give one dose of IV amikacin 10mg/kg if he continues to deteriorates   - Increase PO vancomycin to 125mg q12hrs while on board-spectrum antibiotics  - Follow up on blood cultures (7/28 pending)  - Will continue to follow  - Guarded prognosis      * THIS IS AN INCOMPLETE NOTE. FINAL RECOMMENDATION WILL BE UPDATED AFTER DISCUSSING WITH ATTENDING *      Marisa Hall DO  PGY4 ID fellow  Pager: [phone]  [facility] pager ID: [phone]  Please contact me via page or text through Microsoft Teams  If after 5PM or on weekends, please call [phone]     66 years old male with a history of stage D NICM s/p HM2 on 9/2017 as DT (due to severe PAD) with TV ring, prior COVID-19 infection 4/2020, recurrent syncope post LVAD s/p ILR, admitted on 6/14/21with symptomatic anemia with Hgb 4.5 in setting of INR 8.8 without hemodynamic instability. He was transfused and underwent VCE which showed active bleeding in the mid small bowel but subsequent enteroscopy 6/15 did not reveal any active bleeding. He acutely decompensated after procedure with fever/hypertension, low flow alarms, and pulmonary infiltrate with hypoxia requiring intubation from probable aspiration PNA. Course notable for inability to wean ventilator with persistent secretions for which he underwent tracheostomy. He developed C. diff colitis and is on vancomycin taper. He is currently vent dependent now and requiring pressor support. BCx showed serratia on 7/26, which correlates to his sputum culture on 7/7 and 7/27. The source is most likely from pneumonia. Transaminitis could be secondary to acalculous cholecystitis. Serratia is unlikely to cause device infection (gram negative organism) but if his blood culture is persistently positive, patient might need further evaluation on possible device infection.      IMPRESSION:  Septic shock secondary to high grade Serratia bacteremia, secondary to pneumonia  Tension pneumothorax s/p right chest tube 7/26  Transaminitis, cholestatic pattern, possibly acalculous cholecystitis  C. diff colitis on PO vancomycin taper  ICM s/p LVAD  BCx serratia 7/26, NGTD 7/27  Sputum culture serratia 7/7, 7/27  CT consolidation on left lower lobe  No cholangitis  No signs of drive line infection      RECOMMENDATIONS:  - Continue IV meropenem 1gm q8hrs to cover serratia and other intra-abdominal organisms, can give one dose of IV amikacin 10mg/kg if he continues to deteriorates   - Increase PO vancomycin to 125mg q12hrs while on board-spectrum antibiotics  - Follow up on blood cultures (7/28 pending)  - Will continue to follow  - Guarded prognosis      * THIS IS AN INCOMPLETE NOTE. FINAL RECOMMENDATION WILL BE UPDATED AFTER DISCUSSING WITH ATTENDING *      Marisa Hall DO  PGY4 ID fellow  Pager: [phone]  [facility] pager ID: [phone]  Please contact me via page or text through Microsoft Teams  If after 5PM or on weekends, please call [phone]     66 years old male with a history of stage D NICM s/p HM2 on 9/2017 as DT (due to severe PAD) with TV ring, prior COVID-19 infection 4/2020, recurrent syncope post LVAD s/p ILR, admitted on 6/14/21with symptomatic anemia with Hgb 4.5 in setting of INR 8.8 without hemodynamic instability. He was transfused and underwent VCE which showed active bleeding in the mid small bowel but subsequent enteroscopy 6/15 did not reveal any active bleeding. He acutely decompensated after procedure with fever/hypertension, low flow alarms, and pulmonary infiltrate with hypoxia requiring intubation from probable aspiration PNA. Course notable for inability to wean ventilator with persistent secretions for which he underwent tracheostomy. He developed C. diff colitis and is on vancomycin taper. He is currently vent dependent now and requiring pressor support. BCx showed serratia on 7/26, which correlates to his sputum culture on 7/7 and 7/27. The source is most likely from pneumonia or cholecystitis. Transaminitis could be secondary to acalculous cholecystitis. Serratia is unlikely to cause device infection (gram negative organism) but if his blood culture is persistently positive, patient might need further evaluation on possible device infection.      IMPRESSION:  Septic shock secondary to high grade Serratia bacteremia, secondary to pneumonia vs cholecystitis  Tension pneumothorax s/p right chest tube 7/26  Transaminitis, cholestatic pattern, possibly acalculous cholecystitis  C. diff colitis on PO vancomycin taper  ICM s/p LVAD  BCx serratia 7/26, NGTD 7/27  Sputum culture serratia 7/7, 7/27  CT consolidation on left lower lobe, improved from CT in June 2021  No cholangitis  No signs of drive line infection      RECOMMENDATIONS:  - Agree with IV cefepime 1gm q8hrs and IV Flagyl 500mg q8hrs  - Increase PO vancomycin to 125mg q12hrs while on board-spectrum antibiotics  - Follow up on blood cultures (7/28 pending)  - Follow up with IR for possible percutaneous cholecystostomy tube placement  - Will continue to follow  - Guarded prognosis    Case discussed with attending and primary team      Marisa Hall DO  PGY4 ID fellow  Pager: [phone]  [facility] pager ID: [phone]  Please contact me via page or text through Microsoft Teams  If after 5PM or on weekends, please call [phone]

## 2021-07-29 NOTE — PROGRESS NOTE ADULT - SUBJECTIVE AND OBJECTIVE BOX
RICKY JOINT  MRN#: [id number]  Subjective:  Pulmonary progress  : recurrent Acute hypoxic respiratory Failure ,aspiration pneumonia, NICM  , chart reviewed and H/O obtained radiological and Laboratory study reviewed patient Examined     65M PMH ACC/AHA stage D HF due to NICM HM2 LVAD , TV annuloplasty ring 17 as destination therapy due to severe peripheral artery disease with significant stenosis  SIADH, Depression, CKD-3 with hyperkalemia, past E. coli UTIs, driveline drainage (21) and COVID-19 (back in 2020)  He was recently seen in clinic where he complained of abdominal pain and dark stools w constipation back in May. He presents to [facility] ER today weakness and fatigue, moderate and + Black stools for three days, on coumadin secondary to warfarin use in the setting of an LVAD. Patient has required transfusions for GIB in the past. Mostly recently back in 2021 pt had anemia with dark stools. No interventions was done at that time. However Last Endoscopy was done in 2020 (negative). Today labs show patient is anemic with H/H of 4.5/16.3,. INR is 8.84 MAP in the 90s, Temp 35.1. He denies any chest pain, shortness of breath, dizziness, abd pain, nausea or vomiting. found to have  rectal bleeding underwent endoscopy ,old blood in the proximal ileum ,  develop sepsis with LL opacity given Antibiotics , Extubated , reintubated , Bronchoscopy on Zosyn for LL pneumonia  and Amiodarone S/P TV Annuloplasty , patient remain intubated on full ventilatory support .S/P multiple units of blood transfusion , remain on full ventilatory support on Precedex and propofol , new central IJ line , diarrhea C diff. +ve on po vancomycin and IV Flagyl,  mildly distended belly , fever start on cefepime 2gm q 8 hrs S/P tracheostomy .  new RT Subclavian central line continue on contact  isolation ,still diarrhea on C-diff antibiotics ENT follow up appreciated , trial of C-PAP as tolerated , , copious secretion from trach. site chest x ray left lower lobe pneumonia , tolerating trch. collar 50% FI02 still excessive secretion need pulmonary toilet , off Ancef antibiotic , no more diarrhea back on full support mechanical ventilator , chest x ray show improvement in LLL air space disease, more awake and responsive on tube feeding no more diarrhea , S/P  Ancef for bacteremia no fever ,ID follow up noted ,  no nausea or vomiting or diarrhea still very weak and tired , event note pulled NG tube now replaced , back on tube feeding ,still on po vancomycin , getting PT and OT at bed side , no plan for decannulation for now. , no more diarrhea receiving PT and OPT at bed side , minimal secretion from tracheostomy site , no SOB getting stronger , improve muscle tone patient transfer to monitor bed still on contact isolation for C-Difficel colitis on 50% FI02, NG tube clogged , and change to resume tube feeding still loose stool . H/H drop significantly require blood transfusion , most likely GI bleeding , IV heparin D/C , vomiting 200 cc of creamy color tube feeding on hold no sob, still melena monitor in the CTU possible capsule endoscopy , H/H is stable ., patient develop TR sided  pneumothorax require chest tube placement , RT IJ central line  placed , develop fever shaking chills , blood culture positive for serratia marcescens , start on cefepime .the patient  become hypoxic and hypotensive placed on full ventilatory support and Vasopressin , levophed and Dobutamine ,S/P blood transfusion on meropenem and vancomycin , IR note appreciated   no IR drainage for Gall bladder , weaned off pressors , occasional agitation on Precedex .         (2021 16:57)    PAST MEDICAL & SURGICAL HISTORY:  CHF (congestive heart failure)    CAD (coronary artery disease)  Depression    Pleural effusion    History of 2019 novel coronavirus disease (COVID-19)  2020    Hemorrhoids    Bleeding hemorrhoids    Peripheral arterial disease    Claudication    BPH with urinary obstruction    ACC/AHA stage D systolic heart failure  Anticoagulation goal of INR 2.0 to 2.5    Falls    Clavicle fracture    CKD (chronic kidney disease), stage III    Iron deficiency anemia    H/O epistaxis    Vertigo    GI bleed    S/P TVR (tricuspid valve repair)    S/P ventricular assist device    S/P endoscopy    OBJECTIVE:  ICU Vital Signs Last 24 Hrs  T(C): 38.2 (2021 10:00), Max: 38.5 (2021 12:00)  T(F): 100.8 (2021 10:00), Max: 101.3 (2021 12:00)  HR: 65 (2021 10:00) (61 - 69)  BP: --  BP(mean): --  ABP: 105/67 (2021 10:00) (90/54 - 113/64)  ABP(mean): 77 (2021 10:00) (63 - 77)  RR: 20 (2021 10:00) (19 - 35)  SpO2: 99% (2021 10:00) (96% - 100%)       @ 07:  -   @ 07:00  --------------------------------------------------------  IN: 2693.9 mL / OUT: 1415 mL / NET: 1278.9 mL     @ 07:01  -   @ 10:49  --------------------------------------------------------  IN: 420.8 mL / OUT: 115 mL / NET: 305.8 mL  PHYSICAL EXAM:Daily   Elderly male S/P tracheostomy   on  full ventilatory support on  50% FI02 off vasopressin , Dobutamine , levophed   Daily Weight in k.4 (2021 00:00)  HEENT:     + NCAT  + EOMI  - Conjuctival edema   - Icterus   - Thrush   - ETT  + NGT/OGT  Neck:         + FROM   RT IJ line  JVD     - Nodes     - Masses    + Mid-line trachea + Tracheostomy  Chest:            RT pig tail cathter in place   Lungs:          + CTA   + Rhonchi    - Rales    - Wheezing + Decreased  LT BS   - Dullness R L  Cardiac:       + S1 + S2    + RRR   - Irregular   - S3  - S4    - Murmurs   - Rub   - Hamman’s sign   Abdomen:    + BS     + Soft    + Non-tender     - Distended    - Organomegaly  - PEG  Extremities:   - Cyanosis U/L   - Clubbing  U/L  + LE/UE Edema   + Capillary refill    + Pulses   Neuro:        - Awake   -  Alert   - Confused   - Lethargic   + Sedated  + Generalized Weakness  Skin:        - Rashes    - Erythema   + Normal incisions   + IV sites intact          HOSPITAL MEDICATIONS: All mediciations reviewed and analyzed  MEDICATIONS  (STANDING):  aMIOdarone    Tablet 200 milliGRAM(s) Oral daily  chlorhexidine 0.12% Liquid 15 milliLiter(s) Oral Mucosa every 12 hours  chlorhexidine 2% Cloths 1 Application(s) Topical <User Schedule>  dexMEDEtomidine Infusion 0.5 MICROgram(s)/kG/Hr (9.81 mL/Hr) IV Continuous <Continuous>  dextrose 50% Injectable 50 milliLiter(s) IV Push every 15 minutes  heparin  Infusion 400 Unit(s)/Hr (12.5 mL/Hr) IV Continuous <Continuous>  HYDROmorphone  Injectable 0.5 milliGRAM(s) IV Push once  insulin lispro (ADMELOG) corrective regimen sliding scale   SubCutaneous every 6 hours  pantoprazole  Injectable 40 milliGRAM(s) IV Push every 12 hours  piperacillin/tazobactam IVPB.. 3.375 Gram(s) IV Intermittent every 8 hours  propofol Infusion 20 MICROgram(s)/kG/Min (9.42 mL/Hr) IV Continuous <Continuous>  sodium chloride 0.9% lock flush 3 milliLiter(s) IV Push every 8 hours  sodium chloride 0.9%. 1000 milliLiter(s) (10 mL/Hr) IV Continuous <Continuous>    MEDICATIONS  (PRN):  acetaminophen    Suspension .. 650 milliGRAM(s) Oral every 6 hours PRN Temp greater or equal to 38C (100.4F)    LABS: All Lab data reviewed and analyzed                        8.0    12.47 )-----------( 100      ( 2021 06:10 )        130<L>  |  99  |  17  ----------------------------<  136<H>  4.0   |  23  |  0.57    Ca    8.4      2021 00:28  Phos  0.9     -  Mg     2.2         TPro  6.0  /  Alb  2.9<L>  /  TBili  0.9  /  DBili  x   /  AST  209<H>  /  ALT  237<H>  /  AlkPhos  171<H>        Ca    8.6      2021 00:32  Phos  1.9     -  Mg     1.8         TPro  6.2  /  Alb  3.1<L>  /  TBili  1.7<H>  /  DBili  x   /  AST  195<H>  /  ALT  157<H>  /  AlkPhos  131<H>      Ca    8.5      2021 00:34  Phos  2.1       Mg     2.2         TPro  6.7  /  Alb  3.4  /  TBili  1.1  /  DBili  x   /  AST  68<H>  /  ALT  48<H>  /  AlkPhos  171<H>      Ca    9.5      2021 06:11  Phos  2.9       Mg     2.0         TPro  7.5  /  Alb  3.8  /  TBili  0.4  /  DBili  x   /  AST  22  /  ALT  17  /  AlkPhos  72                                                            PTT - ( 2021 04:52 )  PTT:45.2 sec LIVER FUNCTIONS - ( 2021 00:42 )  Alb: 3.4 g/dL / Pro: 6.7 g/dL / ALK PHOS: 213 U/L / ALT: 15 U/L / AST: 24 U/L / GGT: x           RADIOLOGY: - Reviewed and analyzed RT Pig tail cathter  , LVAD HM2, CT scan of abdomen reviewed result noted

## 2021-07-29 NOTE — PROGRESS NOTE ADULT - ASSESSMENT
Mr. Vic Quispe initially admitted for melena and anemia in the setting of supratherapeutic INR.  GI reconsulted for worsening anemia.    # Acute blood loss anemia and melena - hemodynamically unchanged. Likely represents recurrent GI bleed. Initial melena workup lead to capsule endoscopy on 6/14/2021 which revealed active bleeding in small bowel.  Single balloon 6/15/2021 revealed old blood in proximal ileum. 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  Plan for repeat capsule endoscopy, with further studies pending results and clinical course.  # Recent melena with presumed small bowel source  # LVAD on anticoagulation with coumadin  # Trach    Recommendations:  - PPI 40mg IV BID  - trend vitals, CBC, and monitor for clinical signs of bleeding  - transfuse for goal hemoglobin >/= 7.0  - Cardiology managing anticoagulation  - will re-evaluate for capsule endoscopy given trend in hemoglobin and melena      Tito Guerrier, PGY-4  GI/Hepatology Fellow    MONDAY-FRIDAY 8AM-5PM:  Available via Microsoft Teams  Pager# [phone] ([facility]) or [phone] ([facility])  GI Phone# [phone] ([facility])    NON-URGENT CONSULTS:  Please email [email or web address] OR [email or web address]  AT NIGHT AND ON WEEKENDS:  Contact on-call GI fellow via answering service ([phone]) from 5pm-8am and on weekends/holidays     Mr. Vic Quispe initially admitted for melena and anemia in the setting of supratherapeutic INR.  GI reconsulted for worsening anemia.    # Acute blood loss anemia and melena - hemodynamically unchanged. Likely represents recurrent GI bleed. Initial melena workup lead to capsule endoscopy on 6/14/2021 which revealed active bleeding in small bowel.  Single balloon 6/15/2021 revealed old blood in proximal ileum. 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  Plan for repeat capsule endoscopy, with further studies pending results and clinical course.  # Recent melena with presumed small bowel source  # LVAD on anticoagulation with coumadin  # Trach  # Septic shock    Recommendations:  - PPI 40mg IV BID  - trend vitals, CBC, and monitor for clinical signs of bleeding  - transfuse for goal hemoglobin >/= 7.0  - Cardiology managing anticoagulation  - Septic and hypotensive - not stable for endoscopic evaluation, doubt that blood loss is cause of hypotension, more likely underlying sepsis; would transfuse and follow-up repeat CBC - if stabilized, can consider enteroscopy +/- capsule      Tito Guerrier, PGY-4  GI/Hepatology Fellow    MONDAY-FRIDAY 8AM-5PM:  Available via [employer] Teams  Pager# [phone] ([facility]) or [phone] ([facility])  GI Phone# [phone] ([facility])    NON-URGENT CONSULTS:  Please email [email or web address] OR [email or web address]  AT NIGHT AND ON WEEKENDS:  Contact on-call GI fellow via answering service ([phone]) from 5pm-8am and on weekends/holidays

## 2021-07-29 NOTE — PROGRESS NOTE ADULT - ATTENDING COMMENTS
Patient seen and examined  Case discussed with Dr Hall  I agree with his interval history exam and plans as noted above    Blood culture clearing on IV antibiotics for Serratia bacteremia  sensitivity suggest can change ti Cefepime/Flagyl  continue oral Vancomycin 125 bid  agree with plans for IR percutaneous cholecystotomy    Morris Prater MD  [phone]  After 5pm/weekends [phone]

## 2021-07-29 NOTE — PROGRESS NOTE ADULT - ASSESSMENT
Assessment and Recommendation:   · Assessment	  Assessment and recommendation :  Recurrent Acute hypoxic respiratory Failure S/P tracheostomy on full ventilatory  at 50% Fi02, /16/5 PEEP   Acute blood loss anemia S/P multiple  blood transfusion   septic shock on vasopressin , levophed and Dobutamine   sepsis with serratia marcescens in the blood on meropenem and vancomycin   RT pneumothorax Pig tail cathter in place   elevated liver enzyme ? passive liver congestion v/s Amiodarone effect   S/P Acute left lower lobe pneumonia   Aspiration pneumonia   Stool is  +ve for C-diff. colitis on PO vancomycin  improved   S/P  Ancef improved for bacteremia   Non ischemic cardiomyopathy continue ACE inhibitor and B-Blockers   S/P Septic shock and cardiogenic shock   Stage D systolic heart failure S/P LVAD HM2   MH2 LVAD  with  TV Annuloplasty  Severe peripheral vascular disease   S/P Anion gap metabolic acidosis  severe hyperglycemia on insulin coverage    cardiac arrythmia  on  Amiodarone   critical care polyneuropathy   Anemia of Acute blood Loss   S/P Small bowel bleeding   S/P blood and FFP transfusion   Chronic kidney disease stage III   NGT feeding on Jevity   PT and OT at bed side   GI prophylaxis with PPI   critical care time is 35 minutes

## 2021-07-29 NOTE — PROGRESS NOTE ADULT - SUBJECTIVE AND OBJECTIVE BOX
RICKY HAMPTON  MRN-[id number]  Patient is a 66y old  Male who presents with a chief complaint of Anemia, Supratherapeutic INR, Dark Stools (2021 19:53)    HPI:  65M PMH ACC/AHA stage D HF due to NICM HM2 LVAD , TV annuloplasty ring 17 as destination therapy due to severe peripheral artery disease with significant stenosis  SIADH, Depression, CKD-3 with hyperkalemia, past E. coli UTIs, driveline drainage (21) and COVID-19 (back in 2020)  He was recently seen in clinic where he complained of abdominal pain and dark stools w constipation back in May. He presents to [facility] ER today weakness and fatigue, moderate and + Black stools for three days, on coumadin secondary to warfarin use in the setting of an LVAD. Patient has required transfusions for GIB in the past. Mostly recently back in 2021 pt had anemia with dark stools. No interventions was done at that time. However Last Endoscopy was done in 2020 (negative). Today labs show patient is anemic with H/H of 4.5/16.3,. INR is 8.84 MAP in the 90s, Temp 35.1. He denies any chest pain, shortness of breath, dizziness, abd pain, nausea or vomiting.       (2021 16:57)      Surgery/Hospital Course:   admit for melena w/ anemia, INR 8.84   6/15 Capsul study (+) for small bowel bleed, balloon endoscopy (old blood in prox ileum); post EGD - septic w/ L opacity, re-intubated for concern for aspiration, TTE (Mod MR, decrease biV w/ interventricular septum boweing towards R)   bronch    +C Diff    TC    CT C/A/P: Fluid filled colon which may be 2/2 rapid transit. Small bilateral pleffs with associates. Compressive atelectasis New ISABELLE & LLL  parenchymal opacities, suspicious for pneumonia. 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    #8 Shiley trach at bedside    CPAP trials    LVAD speed increased to 9200   Bronch; Central line dc'ed   TC since , continue as tolerated. Patient transferred to SDU.    Cont PT, no trach downsize today(#8cuffed)/ Anticoagulation/ Continue TF, speech f/u/ Vanco taper    oob to chair  INR  2.47  5 mg coumadin     increased lop to 25 q8  per HF   INR today 2.64.  H&H 7.3 this AM.  Will repeat CBC at noon, and will send stool guaiac Patient with persistent abdominal tenderness, rate of tube feeds decreased.  No nausea/vomiting.     INR today 2.4H&H 9.1/.6 low flow overnight /N&V  NPO resolved for capsule study  Coumadin on hold refusing Tube feeds on D5 1/ normal  @50 cc/hr   INR 2.69  H&H 7..1 refusing Tube feeds on D5 1/2 normal  @50 cc/hr. This am + BM Anusha Oneill HF  aware- PRBC x1  GI team consulted -  NPO  plan on study in am-  D/w Dr Cadet Patient  to return to CTU for further management; 1PRBCS    Post op INR 2.2 today.  No bleeding. BC + for SM.  Pt is hypotensive requiring pressor and inotropic support.  ID follow up today on Cefepime will follow.   R PTC for PTX    CT C/A/P: sub q emphysema in R chest wall, GGO RUL, small ascites CTH negative; Abd US: GB thickening, pericholecystic fluid      Today:    REVIEW OF SYSTEMS:  Unable to obtain secondary to pt being trached     ICU Vital Signs Last 24 Hrs  T(C): 37.4 (2021 04:00), Max: 38.3 (2021 16:00)  T(F): 99.3 (2021 04:00), Max: 100.9 (2021 16:00)  HR: 122 (2021 07:30) (0 - 122)  BP: --  BP(mean): --  ABP: 126/78 (2021 07:30) (77/67 - 126/78)  ABP(mean): 92 (2021 07:30) (64 - 92)  RR: 47 (2021 07:30) (18 - 88)  SpO2: 100% (2021 07:30) (80% - 100%)      Physical Exam:  Gen:  intubated   CNS: sedated 	  Neck: no JVD, +TC   RES : clear , no wheezing              CVS: +LVAD hum   Abd: Soft, +LLQ tender to palpation   Skin: No rash.  Ext:  no edema    ============================I/O===========================   I&O's Detail    2021 07:01  -  2021 07:00  --------------------------------------------------------  IN:    DOBUTamine: 135.7 mL    Enteral Tube Flush: 360 mL    Insulin: 111 mL    IV PiggyBack: 800 mL    Miscellaneous Tube Feedin mL    Norepinephrine: 14.6 mL    Pantoprazole: 230 mL    PRBCs (Packed Red Blood Cells): 300 mL    Propofol: 69.1 mL    sodium chloride 0.9%: 700 mL    Vasopressin: 70 mL  Total IN: 4120.4 mL    OUT:    Chest Tube (mL): 80 mL    Dexmedetomidine: 0 mL    Indwelling Catheter - Urethral (mL): 1845 mL  Total OUT: 1925 mL    Total NET: 2195.4 mL        ============================ LABS =========================                        8.0    12.47 )-----------( 100      ( 2021 06:10 )             24.6     07-29    130<L>  |  99  |  17  ----------------------------<  136<H>  4.0   |  23  |  0.57    Ca    8.4      2021 00:28  Phos  0.9     -29  Mg     2.2         TPro  6.0  /  Alb  2.9<L>  /  TBili  0.9  /  DBili  x   /  AST  209<H>  /  ALT  237<H>  /  AlkPhos  171<H>      LIVER FUNCTIONS - ( 2021 00:28 )  Alb: 2.9 g/dL / Pro: 6.0 g/dL / ALK PHOS: 171 U/L / ALT: 237 U/L / AST: 209 U/L / GGT: x           PT/INR - ( 2021 00:28 )   PT: 14.4 sec;   INR: 1.21 ratio           ABG - ( 2021 00:28 )  pH, Arterial: 7.41  pH, Blood: x     /  pCO2: 43    /  pO2: 174   / HCO3: 27    / Base Excess: 2.5   /  SaO2: 100                 ======================Micro/Rad/Cardio=================  Culture: Reviewed   CXR: Reviewed  Echo:Reviewed  ======================================================  PAST MEDICAL & SURGICAL HISTORY:  CHF (congestive heart failure)    CAD (coronary artery disease)    Depression    Pleural effusion    History of 2019 novel coronavirus disease (COVID-19)  2020    Hemorrhoids    Bleeding hemorrhoids    Peripheral arterial disease    Claudication    BPH with urinary obstruction    ACC/AHA stage D systolic heart failure    Anticoagulation goal of INR 2.0 to 2.5    Falls    Clavicle fracture    CKD (chronic kidney disease), stage III    Iron deficiency anemia    H/O epistaxis    Vertigo    GI bleed    S/P TVR (tricuspid valve repair)    S/P ventricular assist device    S/P endoscopy      ====================ASSESSMENT ==============  Stage D Nonischemic Cardiomyopathy, Status Post HM2 on 2017    Cardiogenic shock  Hemodynamic instability   Acute hypoxemic respiratory failure  GI bleed , Status Post Enteroscopy   Anemia, in setting of melena   Chronic Kidney Disease  Stress hyperglycemia   C.diff positive on    Hypovolemic shock  Hyponatremia   Leukocytosis    Plan:  ====================== NEUROLOGY=====================  CTH on  with no acute intracranial hemorrhage or acute territorial infarction   Sedated with IV Precedex to maintain vent synchrony   Tylenol PRN for analgesia   C/w clonazepam for agitation    acetaminophen    Suspension .. 650 milliGRAM(s) Oral every 6 hours PRN Mild Pain (1 - 3)  clonazePAM  Tablet 0.5 milliGRAM(s) Oral at bedtime  dexMEDEtomidine Infusion 0.5 MICROgram(s)/kG/Hr (9.81 mL/Hr) IV Continuous <Continuous>    ==================== RESPIRATORY======================  Acute hypoxemic respiratory failure s/p #8 shiley trach on ; CPAP/TC vent weaning as tolerated   Requiring full vent support, continue close monitoring of respiratory rate and breathing pattern, following of ABG’s with A-line monitoring, continuous pulse oximetry monitoring.   R PTC placed on  for mod R PTX, continue to monitor output.   CT chest : Sub q emphysema in R chest wall, GGO RUL      Mechanical Ventilation:  Mode: AC/ CMV (Assist Control/ Continuous Mandatory Ventilation)  RR (machine): 16  TV (machine): 500  FiO2: 40  PEEP: 5  ITime: 1  MAP: 16  PIP: 23      ====================CARDIOVASCULAR==================  Stage D Nonischemic Cardiomyopathy, Status Post HM2 on 2017; LVAD settings 9200 with flow of 6.0  TTE : reveals at least moderate MR, mild AI, severe global LV syst dysfxn, dec RV fxn.  Continue invasive hemodynamic monitoring   Inotropic support with IV Dobutamine, maintain MAPs 70s-80s.   Maintain pressor support with IV Vasopressin   Rate control with amiodarone     aMIOdarone    Tablet 200 milliGRAM(s) Oral daily  DOBUTamine Infusion 2.5 MICROgram(s)/kG/Min (5.89 mL/Hr) IV Continuous <Continuous>  vasopressin Infusion 0.017 Unit(s)/Min (1 mL/Hr) IV Continuous <Continuous>    ===================HEMATOLOGIC/ONC ===================  Anemia due to acute blood loss associated with upper GI bleed   Monitor H&H/Plts     ===================== RENAL =========================  Continue monitoring urine output, I&OS, BUN/Cr   Replete lytes PRN. Keep K> 4 and Mg >2     ==================== GASTROINTESTINAL===================  GI bleed in setting of recent melena, GI following - continue Protonix drip   CT A/P on : small ascites; Abdominal US w/ GB wall thickening, pericholecystic fluid   Holding enteral feeds   C.diff + on , continue vanco   Simethicone PRN dyspepsia     pantoprazole Infusion 8 mG/Hr (10 mL/Hr) IV Continuous <Continuous>  simethicone 80 milliGRAM(s) Chew every 8 hours PRN Dyspepsia  sodium chloride 0.9%. 1000 milliLiter(s) (5 mL/Hr) IV Continuous <Continuous>    =======================    ENDOCRINE  =====================  Stress hyperglycemia, continue glucose control with IV insulin drip     insulin regular Infusion 3 Unit(s)/Hr (3 mL/Hr) IV Continuous <Continuous>    ========================INFECTIOUS DISEASE================  Temp 99.3F, WBC rising 11.78->12.47  Monitor temperature and trend WBC   BCx+  +Serratia marcescens c/w meropenem, BC  NG    C.diff + on , continue vanco       meropenem  IVPB 1000 milliGRAM(s) IV Intermittent every 8 hours  vancomycin    Solution 125 milliGRAM(s) Oral daily      Patient requires continuous monitoring with bedside rhythm monitoring, pulse ox monitoring, and intermittent blood gas analysis. Care plan discussed with ICU care team. Patient remained critical and at risk for life threatening decompensation.     By signing my name below, IAgnieszka, attest that this documentation has been prepared under the direction and in the presence of CLAUDIA Johnson   Electronically signed: Romel Shaffer, 21 @ 08:35    I, Carloz Strickland, personally performed the services described in this documentation. all medical record entries made by the romel were at my direction and in my presence. I have reviewed the chart and agree that the record reflects my personal performance and is accurate and complete  Electronically signed: CLAUDIA Johnson        RICKY HAMPTON  MRN-[id number]  Patient is a 66y old  Male who presents with a chief complaint of Anemia, Supratherapeutic INR, Dark Stools (2021 19:53)    HPI:  65M PMH ACC/AHA stage D HF due to NICM HM2 LVAD , TV annuloplasty ring 17 as destination therapy due to severe peripheral artery disease with significant stenosis  SIADH, Depression, CKD-3 with hyperkalemia, past E. coli UTIs, driveline drainage (21) and COVID-19 (back in 2020)  He was recently seen in clinic where he complained of abdominal pain and dark stools w constipation back in May. He presents to [facility] ER today weakness and fatigue, moderate and + Black stools for three days, on coumadin secondary to warfarin use in the setting of an LVAD. Patient has required transfusions for GIB in the past. Mostly recently back in 2021 pt had anemia with dark stools. No interventions was done at that time. However Last Endoscopy was done in 2020 (negative). Today labs show patient is anemic with H/H of 4.5/16.3,. INR is 8.84 MAP in the 90s, Temp 35.1. He denies any chest pain, shortness of breath, dizziness, abd pain, nausea or vomiting.       (2021 16:57)      Surgery/Hospital Course:   admit for melena w/ anemia, INR 8.84   6/15 Capsul study (+) for small bowel bleed, balloon endoscopy (old blood in prox ileum); post EGD - septic w/ L opacity, re-intubated for concern for aspiration, TTE (Mod MR, decrease biV w/ interventricular septum boweing towards R)   bronch    +C Diff    TC    CT C/A/P: Fluid filled colon which may be 2/2 rapid transit. Small bilateral pleffs with associates. Compressive atelectasis New ISABELLE & LLL  parenchymal opacities, suspicious for pneumonia. 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    #8 Shiley trach at bedside    CPAP trials    LVAD speed increased to 9200   Bronch; Central line dc'ed   TC since , continue as tolerated. Patient transferred to SDU.    Cont PT, no trach downsize today(#8cuffed)/ Anticoagulation/ Continue TF, speech f/u/ Vanco taper    oob to chair  INR  2.47  5 mg coumadin     increased lop to 25 q8  per HF   INR today 2.64.  H&H 7.3 this AM.  Will repeat CBC at noon, and will send stool guaiac Patient with persistent abdominal tenderness, rate of tube feeds decreased.  No nausea/vomiting.     INR today 2.4H&H 9.1/.6 low flow overnight /N&V  NPO resolved for capsule study  Coumadin on hold refusing Tube feeds on D5 1/ normal  @50 cc/hr   INR 2.69  H&H 7..1 refusing Tube feeds on D5 1/2 normal  @50 cc/hr. This am + BM Anusha Oneill HF  aware- PRBC x1  GI team consulted -  NPO  plan on study in am-  D/w Dr Cadet Patient  to return to CTU for further management; 1PRBCS    Post op INR 2.2 today.  No bleeding. BC + for SM.  Pt is hypotensive requiring pressor and inotropic support.  ID follow up today on Cefepime will follow.   R PTC for PTX    CT C/A/P: sub q emphysema in R chest wall, GGO RUL, small ascites CTH negative; Abd US: GB thickening, pericholecystic fluid      Today:  US yesterday showing GB wall thickening w/ surrounding pericholecystic fluid, for percutaneous cholecystostomy today with IR.  Inotropic support with IV Dobutamine weaned to off this AM. Continue diuresis prn for goal CVP 8-10.     REVIEW OF SYSTEMS:  Unable to obtain secondary to pt being trached     ICU Vital Signs Last 24 Hrs  T(C): 37.4 (2021 04:00), Max: 38.3 (2021 16:00)  T(F): 99.3 (2021 04:00), Max: 100.9 (2021 16:00)  HR: 122 (2021 07:30) (0 - 122)  BP: --  BP(mean): --  ABP: 126/78 (2021 07:30) (77/67 - 126/78)  ABP(mean): 92 (2021 07:30) (64 - 92)  RR: 47 (2021 07:30) (18 - 88)  SpO2: 100% (2021 07:30) (80% - 100%)      Physical Exam:  Gen:  intubated   CNS: sedated 	  Neck: no JVD, +TC   RES : clear , no wheezing              CVS: +LVAD hum   Abd: Soft, +LLQ tender to palpation   Skin: No rash.  Ext:  no edema    ============================I/O===========================   I&O's Detail    2021 07:01  -  2021 07:00  --------------------------------------------------------  IN:    DOBUTamine: 135.7 mL    Enteral Tube Flush: 360 mL    Insulin: 111 mL    IV PiggyBack: 800 mL    Miscellaneous Tube Feedin mL    Norepinephrine: 14.6 mL    Pantoprazole: 230 mL    PRBCs (Packed Red Blood Cells): 300 mL    Propofol: 69.1 mL    sodium chloride 0.9%: 700 mL    Vasopressin: 70 mL  Total IN: 4120.4 mL    OUT:    Chest Tube (mL): 80 mL    Dexmedetomidine: 0 mL    Indwelling Catheter - Urethral (mL): 1845 mL  Total OUT: 1925 mL    Total NET: 2195.4 mL        ============================ LABS =========================                        8.0    12.47 )-----------( 100      ( 2021 06:10 )             24.6     07-29    130<L>  |  99  |  17  ----------------------------<  136<H>  4.0   |  23  |  0.57    Ca    8.4      2021 00:28  Phos  0.9       Mg     2.2         TPro  6.0  /  Alb  2.9<L>  /  TBili  0.9  /  DBili  x   /  AST  209<H>  /  ALT  237<H>  /  AlkPhos  171<H>      LIVER FUNCTIONS - ( 2021 00:28 )  Alb: 2.9 g/dL / Pro: 6.0 g/dL / ALK PHOS: 171 U/L / ALT: 237 U/L / AST: 209 U/L / GGT: x           PT/INR - ( 2021 00:28 )   PT: 14.4 sec;   INR: 1.21 ratio           ABG - ( 2021 00:28 )  pH, Arterial: 7.41  pH, Blood: x     /  pCO2: 43    /  pO2: 174   / HCO3: 27    / Base Excess: 2.5   /  SaO2: 100                 ======================Micro/Rad/Cardio=================  Culture: Reviewed   CXR: Reviewed  Echo:Reviewed  ======================================================  PAST MEDICAL & SURGICAL HISTORY:  CHF (congestive heart failure)    CAD (coronary artery disease)    Depression    Pleural effusion    History of 2019 novel coronavirus disease (COVID-19)  2020    Hemorrhoids    Bleeding hemorrhoids    Peripheral arterial disease    Claudication    BPH with urinary obstruction    ACC/AHA stage D systolic heart failure    Anticoagulation goal of INR 2.0 to 2.5    Falls    Clavicle fracture    CKD (chronic kidney disease), stage III    Iron deficiency anemia    H/O epistaxis    Vertigo    GI bleed    S/P TVR (tricuspid valve repair)    S/P ventricular assist device    S/P endoscopy      ====================ASSESSMENT ==============  Stage D Nonischemic Cardiomyopathy, Status Post HM2 on 2017    Cardiogenic shock  Hemodynamic instability   Acute hypoxemic respiratory failure  GI bleed , Status Post Enteroscopy   Anemia, in setting of melena   Chronic Kidney Disease  Stress hyperglycemia   C.diff positive on    Hypovolemic shock  Hyponatremia   Leukocytosis    Plan:  ====================== NEUROLOGY=====================  CTH on  with no acute intracranial hemorrhage or acute territorial infarction   Sedated with IV Precedex to maintain vent synchrony   Tylenol PRN for analgesia   C/w clonazepam for agitation    acetaminophen    Suspension .. 650 milliGRAM(s) Oral every 6 hours PRN Mild Pain (1 - 3)  clonazePAM  Tablet 0.5 milliGRAM(s) Oral at bedtime  dexMEDEtomidine Infusion 0.5 MICROgram(s)/kG/Hr (9.81 mL/Hr) IV Continuous <Continuous>    ==================== RESPIRATORY======================  Acute hypoxemic respiratory failure s/p #8 shiley trach on ; CPAP/TC vent weaning as tolerated   Requiring full vent support, continue close monitoring of respiratory rate and breathing pattern, following of ABG’s with A-line monitoring, continuous pulse oximetry monitoring.   R PTC placed on  for mod R PTX, continue to monitor output.   CT chest : Sub q emphysema in R chest wall, GGO RUL      Mechanical Ventilation:  Mode: AC/ CMV (Assist Control/ Continuous Mandatory Ventilation)  RR (machine): 16  TV (machine): 500  FiO2: 40  PEEP: 5  ITime: 1  MAP: 16  PIP: 23      ====================CARDIOVASCULAR==================  Stage D Nonischemic Cardiomyopathy, Status Post HM2 on 2017; LVAD settings 9200 with flow of 6.0  TTE : reveals at least moderate MR, mild AI, severe global LV syst dysfxn, dec RV fxn.  Continue invasive hemodynamic monitoring   Inotropic support with IV Dobutamine weaned to off this AM   Maintain pressor support with IV Vasopressin   Rate control with amiodarone     aMIOdarone    Tablet 200 milliGRAM(s) Oral daily  DOBUTamine Infusion 2.5 MICROgram(s)/kG/Min (5.89 mL/Hr) IV Continuous <Continuous>  vasopressin Infusion 0.017 Unit(s)/Min (1 mL/Hr) IV Continuous <Continuous>    ===================HEMATOLOGIC/ONC ===================  Anemia due to acute blood loss associated with upper GI bleed   Monitor H&H/Plts     ===================== RENAL =========================  Continue monitoring urine output, I&OS, BUN/Cr   Replete lytes PRN. Keep K> 4 and Mg >2   Continue diuresis prn for goal CVP 8-10.     ==================== GASTROINTESTINAL===================  GI bleed in setting of recent melena, GI following - continue Protonix drip   CT A/P on : small ascites; US yesterday showing GB wall thickening w/ surrounding pericholecystic fluid, for percutaneous cholecystostomy today with IR.   Holding enteral feeds   C.diff + on , continue vanco   Simethicone PRN dyspepsia     pantoprazole Infusion 8 mG/Hr (10 mL/Hr) IV Continuous <Continuous>  simethicone 80 milliGRAM(s) Chew every 8 hours PRN Dyspepsia  sodium chloride 0.9%. 1000 milliLiter(s) (5 mL/Hr) IV Continuous <Continuous>    =======================    ENDOCRINE  =====================  Stress hyperglycemia, continue glucose control with IV insulin drip     insulin regular Infusion 3 Unit(s)/Hr (3 mL/Hr) IV Continuous <Continuous>    ========================INFECTIOUS DISEASE================  Temp 99.3F, WBC rising 11.78->12.47  Monitor temperature and trend WBC   BCx+  +Serratia marcescens c/w meropenem, BC  NG    C.diff + on , continue vanco       meropenem  IVPB 1000 milliGRAM(s) IV Intermittent every 8 hours  vancomycin    Solution 125 milliGRAM(s) Oral daily      Patient requires continuous monitoring with bedside rhythm monitoring, pulse ox monitoring, and intermittent blood gas analysis. Care plan discussed with ICU care team. Patient remained critical and at risk for life threatening decompensation.     By signing my name below, I, Agnieszka Cherry, attest that this documentation has been prepared under the direction and in the presence of CLAUDIA Johnson   Electronically signed: Cesia Shaffer, 21 @ 08:35    I, Carloz Strickland, personally performed the services described in this documentation. all medical record entries made by the scribe were at my direction and in my presence. I have reviewed the chart and agree that the record reflects my personal performance and is accurate and complete  Electronically signed: CLAUDIA Johnson

## 2021-07-29 NOTE — CONSULT NOTE ADULT - SUBJECTIVE AND OBJECTIVE BOX
Interventional Radiology    Evaluate for Procedure:  IR consulted on 7/29 for possible percutaneous cholecystectomy, in the setting of abdominal pain, increased LFTS, and abdominal US findings of Gallbladder wall thickening and pericholecystic fluid      HPI:  66y old male w/ PMH ACC/AHA stage D HF due to NICM HM2 LVAD , TV annuloplasty ring 9/12/17 as destination therapy due to severe peripheral artery disease with significant stenosis  SIADH, Depression, CKD-3 with hyperkalemia, past E. coli UTIs, driveline drainage (1/7/21) and COVID-19 (back in April 2020).   This admission presents to [facility] weakness, fatigue, with findings of anemia + melena, with supratherapeutic INR (on  warfarin in the setting of an LVAD)  Allergies:   Medications (Abx/Cardiac/Anticoagulation/Blood Products)    aMIOdarone    Tablet: 200 milliGRAM(s) Oral (07-29 @ 05:30)  cefepime   IVPB: 100 mL/Hr IV Intermittent (07-27 @ 14:12)  DOBUTamine Infusion: 5.89 mL/Hr IV Continuous (07-27 @ 10:47)  furosemide   Injectable: 10 milliGRAM(s) IV Push (07-28 @ 11:37)  meropenem  IVPB: 100 mL/Hr IV Intermittent (07-27 @ 18:30)  meropenem  IVPB: 100 mL/Hr IV Intermittent (07-29 @ 05:31)  vancomycin    Solution: 125 milliGRAM(s) Oral (07-28 @ 14:15)    Data:    T(C): 37.8  HR: 110  BP: --  RR: 65  SpO2: 100%    -WBC 12.47 / HgB 8.0 / Hct 24.6 / Plt 100  -Na 130 / Cl 99 / BUN 17 / Glucose 136  -K 4.0 / CO2 23 / Cr 0.57  - / Alk Phos 171 / T.Bili 0.9  -INR 1.21 / PTT --    LABS:  Aspartate Aminotransferase (AST/SGOT): 209 U/L (07-29-21 @ 00:28)  Alanine Aminotransferase (ALT/SGPT): 237 U/L (07-29-21 @ 00:28)  Aspartate Aminotransferase (AST/SGOT): 195 U/L (07-28-21 @ 00:32)  Alanine Aminotransferase (ALT/SGPT): 157 U/L (07-28-21 @ 00:32)      Radiology:     US Abdomen Upper Quadrant Right (07.28.21 @ 16:00)  IMPRESSION:  Gallbladder wall thickening and pericholecystic fluid, favored to be secondary to ascites in the setting of heart failure. However, if there is continued concern for acute cholecystitis further evaluation with nuclear medicine hepatobiliary scan can be considered.        Assessment:    PMH ACC/AHA stage D HF due to NICM HM2 LVAD , TV annuloplasty ring 9/12/17 as destination therapy due to severe peripheral artery disease with significant stenosis  SIADH, Depression, CKD-3 with hyperkalemia, past E. coli UTIs, driveline drainage (1/7/21) and COVID-19 (back in April 2020).   This admission presents to [facility] weakness, fatigue, with findings of anemia + melena, with supratherapeutic INR (on  warfarin in the setting of an LVAD).     IR consulted on 7/29 for possible percutaneous cholecystectomy, in the setting of abdominal pain, increased LFTS, and abdominal US findings of Gallbladder wall thickening and pericholecystic fluid           Plan:  - Please place order for IR Procedure, approving attending Dr.   - NPO past midnight  - hold therapeutic and prophylactic anticoagulants  - maintain active type and screen x 2  - Please draw AM labs - CBC/INR/BMP    - NEED COVID TEST WITHIN 72HRS OF PROCEDURE.  - Pt needs to be NPO/ Donot need to be NPO. Interventional Radiology    Evaluate for Procedure:  IR consulted on 7/29 for possible percutaneous cholecystostomy, in the setting of abdominal pain, increased LFTS, and abdominal US findings of Gallbladder wall thickening and pericholecystic fluid      HPI:  66y old male w/ PMH ACC/AHA stage D HF due to NICM HM2 LVAD , TV annuloplasty ring 9/12/17 as destination therapy due to severe peripheral artery disease with significant stenosis  SIADH, Depression, CKD-3 with hyperkalemia, past E. coli UTIs, driveline drainage (1/7/21) and COVID-19 (back in April 2020).   This admission presents to [facility] weakness, fatigue, with findings of anemia + melena, with supratherapeutic INR (on  warfarin in the setting of an LVAD)  Allergies:   Medications (Abx/Cardiac/Anticoagulation/Blood Products)    aMIOdarone    Tablet: 200 milliGRAM(s) Oral (07-29 @ 05:30)  cefepime   IVPB: 100 mL/Hr IV Intermittent (07-27 @ 14:12)  DOBUTamine Infusion: 5.89 mL/Hr IV Continuous (07-27 @ 10:47)  furosemide   Injectable: 10 milliGRAM(s) IV Push (07-28 @ 11:37)  meropenem  IVPB: 100 mL/Hr IV Intermittent (07-27 @ 18:30)  meropenem  IVPB: 100 mL/Hr IV Intermittent (07-29 @ 05:31)  vancomycin    Solution: 125 milliGRAM(s) Oral (07-28 @ 14:15)    Data:    T(C): 37.8  HR: 110  BP: --  RR: 65  SpO2: 100%    -WBC 12.47 / HgB 8.0 / Hct 24.6 / Plt 100  -Na 130 / Cl 99 / BUN 17 / Glucose 136  -K 4.0 / CO2 23 / Cr 0.57  - / Alk Phos 171 / T.Bili 0.9  -INR 1.21 / PTT --    LABS:  Aspartate Aminotransferase (AST/SGOT): 209 U/L (07-29-21 @ 00:28)  Alanine Aminotransferase (ALT/SGPT): 237 U/L (07-29-21 @ 00:28)  Aspartate Aminotransferase (AST/SGOT): 195 U/L (07-28-21 @ 00:32)  Alanine Aminotransferase (ALT/SGPT): 157 U/L (07-28-21 @ 00:32)      Radiology:     US Abdomen Upper Quadrant Right (07.28.21 @ 16:00)  IMPRESSION:  Gallbladder wall thickening and pericholecystic fluid, favored to be secondary to ascites in the setting of heart failure. However, if there is continued concern for acute cholecystitis further evaluation with nuclear medicine hepatobiliary scan can be considered.        Assessment:    PMH ACC/AHA stage D HF due to NICM HM2 LVAD , TV annuloplasty ring 9/12/17 as destination therapy due to severe peripheral artery disease with significant stenosis  SIADH, Depression, CKD-3 with hyperkalemia, past E. coli UTIs, driveline drainage (1/7/21) and COVID-19 (back in April 2020).   This admission presents to [facility] weakness, fatigue, with findings of anemia + melena, with supratherapeutic INR (on  warfarin in the setting of an LVAD).     IR consulted on 7/29 for possible percutaneous cholecystostomy, in the setting of abdominal pain, increased LFTS, and abdominal US findings of Gallbladder wall thickening and pericholecystic fluid           Plan:        ____________________________________ pending plan     - Please place order for IR Procedure, approving attending Dr.   - NPO past midnight  - hold therapeutic and prophylactic anticoagulants  - maintain active type and screen x 2  - Please draw AM labs - CBC/INR/BMP    - NEED COVID TEST WITHIN 72HRS OF PROCEDURE.  - Pt needs to be NPO/ Donot need to be NPO. Interventional Radiology    Evaluate for Procedure:  IR consulted on 7/29 for possible percutaneous cholecystostomy, in the setting of abdominal pain, increased LFTS, and abdominal US findings of Gallbladder wall thickening and pericholecystic fluid      HPI:  66y old male w/ PMH ACC/AHA stage D HF due to NICM HM2 LVAD , TV annuloplasty ring 9/12/17 as destination therapy due to severe peripheral artery disease with significant stenosis  SIADH, Depression, CKD-3 with hyperkalemia, past E. coli UTIs, driveline drainage (1/7/21) and COVID-19 (back in April 2020).   This admission presents to [facility] weakness, fatigue, with findings of anemia + melena, with supratherapeutic INR (on  warfarin in the setting of an LVAD)  Allergies:   Medications (Abx/Cardiac/Anticoagulation/Blood Products)    aMIOdarone    Tablet: 200 milliGRAM(s) Oral (07-29 @ 05:30)  cefepime   IVPB: 100 mL/Hr IV Intermittent (07-27 @ 14:12)  DOBUTamine Infusion: 5.89 mL/Hr IV Continuous (07-27 @ 10:47)  furosemide   Injectable: 10 milliGRAM(s) IV Push (07-28 @ 11:37)  meropenem  IVPB: 100 mL/Hr IV Intermittent (07-27 @ 18:30)  meropenem  IVPB: 100 mL/Hr IV Intermittent (07-29 @ 05:31)  vancomycin    Solution: 125 milliGRAM(s) Oral (07-28 @ 14:15)        ICU Vital Signs Last 24 Hrs  T(C): 37.8 (29 Jul 2021 08:00), Max: 38.3 (28 Jul 2021 16:00)  T(F): 100 (29 Jul 2021 08:00), Max: 100.9 (28 Jul 2021 16:00)  HR: 110 (29 Jul 2021 09:39) (0 - 138)  BP: --  BP(mean): --  ABP: 85/74 (29 Jul 2021 09:45) (77/67 - 131/78)  ABP(mean): 79 (29 Jul 2021 09:45) (64 - 93)  RR: 65 (29 Jul 2021 09:30) (18 - 88)  SpO2: 100% (29 Jul 2021 09:45) (80% - 100%)        LABS:  Na: 130 (07-29 @ 00:28), 129 (07-28 @ 00:32), 129 (07-27 @ 00:34)  K: 4.0 (07-29 @ 00:28), 4.0 (07-28 @ 00:32), 4.6 (07-27 @ 00:34)  Cl: 99 (07-29 @ 00:28), 99 (07-28 @ 00:32), 100 (07-27 @ 00:34)  CO2: 23 (07-29 @ 00:28), 19 (07-28 @ 00:32), 20 (07-27 @ 00:34)  BUN: 17 (07-29 @ 00:28), 22 (07-28 @ 00:32), 28 (07-27 @ 00:34)  Cr: 0.57 (07-29 @ 00:28), 0.55 (07-28 @ 00:32), 1.06 (07-27 @ 00:34)  Glu: 136(07-29 @ 00:28), 149(07-28 @ 00:32), 251(07-27 @ 00:34)    Hgb: 8.0 (07-29 @ 06:10), 6.8 (07-29 @ 00:28), 7.3 (07-28 @ 20:55), 7.6 (07-28 @ 17:13), 8.6 (07-28 @ 00:32), 9.0 (07-27 @ 04:13), 8.7 (07-27 @ 00:34), 8.5 (07-26 @ 20:04), 8.4 (07-26 @ 15:42), 8.2 (07-26 @ 11:11)  Hct: 24.6 (07-29 @ 06:10), 21.2 (07-29 @ 00:28), 22.2 (07-28 @ 20:55), 23.4 (07-28 @ 17:13), 26.3 (07-28 @ 00:32), 28.0 (07-27 @ 04:13), 27.9 (07-27 @ 00:34), 26.5 (07-26 @ 20:04), 26.2 (07-26 @ 15:42), 25.6 (07-26 @ 11:11)  WBC: 12.47 (07-29 @ 06:10), 11.78 (07-29 @ 00:28), 12.84 (07-28 @ 20:55), 14.01 (07-28 @ 17:13), 17.87 (07-28 @ 00:32), 15.62 (07-27 @ 04:13), 22.14 (07-27 @ 00:34), 20.12 (07-26 @ 20:04), 21.63 (07-26 @ 15:42), 22.73 (07-26 @ 11:11)  Plt: 100 (07-29 @ 06:10), 103 (07-29 @ 00:28), 103 (07-28 @ 20:55), 106 (07-28 @ 17:13), 130 (07-28 @ 00:32), 176 (07-27 @ 04:13), 185 (07-27 @ 00:34), 179 (07-26 @ 20:04), 223 (07-26 @ 15:42), 231 (07-26 @ 11:11)    INR: 1.21 07-29-21 @ 00:28, 1.32 07-28-21 @ 13:17, 2.23 07-27-21 @ 14:23, 2.84 07-26-21 @ 11:11  PTT: 35.5 07-26-21 @ 11:11         ABG - ( 29 Jul 2021 09:41 )  pH, Arterial: 7.39  pH, Blood: x     /  pCO2: 44    /  pO2: 181   / HCO3: 26    / Base Excess: 1.3   /  SaO2: 100         Aspartate Aminotransferase (AST/SGOT): 209 U/L (07-29-21 @ 00:28)  Alanine Aminotransferase (ALT/SGPT): 237 U/L (07-29-21 @ 00:28)  Aspartate Aminotransferase (AST/SGOT): 195 U/L (07-28-21 @ 00:32)  Alanine Aminotransferase (ALT/SGPT): 157 U/L (07-28-21 @ 00:32)      Radiology:   US Abdomen Upper Quadrant Right (07.28.21 @ 16:00)  IMPRESSION:  Gallbladder wall thickening and pericholecystic fluid, favored to be secondary to ascites in the setting of heart failure. However, if there is continued concern for acute cholecystitis further evaluation with nuclear medicine hepatobiliary scan can be considered.        Assessment:   66y old male  PMH ACC/AHA stage D HF due to NICM HM2 LVAD , TV annuloplasty ring 9/12/17 as destination therapy due to severe peripheral artery disease with significant stenosis  SIADH, Depression, CKD-3 with hyperkalemia, past E. coli UTIs, driveline drainage (1/7/21) and COVID-19 (back in April 2020).   This admission presents to [facility] weakness, fatigue, with findings of anemia + melena, with supratherapeutic INR (on  warfarin in the setting of an LVAD).     IR consulted on 7/29 for possible percutaneous cholecystostomy, in the setting of abdominal pain, increased LFTS, and abdominal US findings of Gallbladder wall thickening and pericholecystic fluid         Plan:  - Transaminitis, 7/28 ultrasound showing GB wall thickening w/ surrounding pericholecystic fluid  - IR will keep patient on procedure schedule on 7/29  for possible percutaneous cholecystostomy, will re-evaluate plan in am and follow up with team with patients overall clinical status overnight.  - Above plan d/w both primary team and IR Attending  - Please place order for IR Procedure, approving attending Dr. Rosa Maria Cole  - NPO past midnight  - hold AM therapeutic and prophylactic anticoagulants  - maintain active type and screen x 2  - Please draw AM labs - CBC/INR/BMP/LFTS  - NEED COVID TEST WITHIN 72HRS OF PROCEDURE.   Interventional Radiology    Evaluate for Procedure:  IR consulted on 7/29 for possible percutaneous cholecystostomy, in the setting of abdominal pain, increased LFTS, and abdominal US findings of Gallbladder wall thickening and pericholecystic fluid      HPI:  66y old male w/ PMH ACC/AHA stage D HF due to NICM HM2 LVAD , TV annuloplasty ring 9/12/17 as destination therapy due to severe peripheral artery disease with significant stenosis  SIADH, Depression, CKD-3 with hyperkalemia, past E. coli UTIs, driveline drainage (1/7/21) and COVID-19 (back in April 2020).   This admission presents to [facility] weakness, fatigue, with findings of anemia + melena, with supratherapeutic INR (on  warfarin in the setting of an LVAD)      Allergies:   Medications (Abx/Cardiac/Anticoagulation/Blood Products)    aMIOdarone    Tablet: 200 milliGRAM(s) Oral (07-29 @ 05:30)  cefepime   IVPB: 100 mL/Hr IV Intermittent (07-27 @ 14:12)  DOBUTamine Infusion: 5.89 mL/Hr IV Continuous (07-27 @ 10:47)  furosemide   Injectable: 10 milliGRAM(s) IV Push (07-28 @ 11:37)  meropenem  IVPB: 100 mL/Hr IV Intermittent (07-27 @ 18:30)  meropenem  IVPB: 100 mL/Hr IV Intermittent (07-29 @ 05:31)  vancomycin    Solution: 125 milliGRAM(s) Oral (07-28 @ 14:15)        ICU Vital Signs Last 24 Hrs  T(C): 37.8 (29 Jul 2021 08:00), Max: 38.3 (28 Jul 2021 16:00)  T(F): 100 (29 Jul 2021 08:00), Max: 100.9 (28 Jul 2021 16:00)  HR: 110 (29 Jul 2021 09:39) (0 - 138)  BP: --  BP(mean): --  ABP: 85/74 (29 Jul 2021 09:45) (77/67 - 131/78)  ABP(mean): 79 (29 Jul 2021 09:45) (64 - 93)  RR: 65 (29 Jul 2021 09:30) (18 - 88)  SpO2: 100% (29 Jul 2021 09:45) (80% - 100%)        LABS:  Na: 130 (07-29 @ 00:28), 129 (07-28 @ 00:32), 129 (07-27 @ 00:34)  K: 4.0 (07-29 @ 00:28), 4.0 (07-28 @ 00:32), 4.6 (07-27 @ 00:34)  Cl: 99 (07-29 @ 00:28), 99 (07-28 @ 00:32), 100 (07-27 @ 00:34)  CO2: 23 (07-29 @ 00:28), 19 (07-28 @ 00:32), 20 (07-27 @ 00:34)  BUN: 17 (07-29 @ 00:28), 22 (07-28 @ 00:32), 28 (07-27 @ 00:34)  Cr: 0.57 (07-29 @ 00:28), 0.55 (07-28 @ 00:32), 1.06 (07-27 @ 00:34)  Glu: 136(07-29 @ 00:28), 149(07-28 @ 00:32), 251(07-27 @ 00:34)    Hgb: 8.0 (07-29 @ 06:10), 6.8 (07-29 @ 00:28), 7.3 (07-28 @ 20:55), 7.6 (07-28 @ 17:13), 8.6 (07-28 @ 00:32), 9.0 (07-27 @ 04:13), 8.7 (07-27 @ 00:34), 8.5 (07-26 @ 20:04), 8.4 (07-26 @ 15:42), 8.2 (07-26 @ 11:11)  Hct: 24.6 (07-29 @ 06:10), 21.2 (07-29 @ 00:28), 22.2 (07-28 @ 20:55), 23.4 (07-28 @ 17:13), 26.3 (07-28 @ 00:32), 28.0 (07-27 @ 04:13), 27.9 (07-27 @ 00:34), 26.5 (07-26 @ 20:04), 26.2 (07-26 @ 15:42), 25.6 (07-26 @ 11:11)  WBC: 12.47 (07-29 @ 06:10), 11.78 (07-29 @ 00:28), 12.84 (07-28 @ 20:55), 14.01 (07-28 @ 17:13), 17.87 (07-28 @ 00:32), 15.62 (07-27 @ 04:13), 22.14 (07-27 @ 00:34), 20.12 (07-26 @ 20:04), 21.63 (07-26 @ 15:42), 22.73 (07-26 @ 11:11)  Plt: 100 (07-29 @ 06:10), 103 (07-29 @ 00:28), 103 (07-28 @ 20:55), 106 (07-28 @ 17:13), 130 (07-28 @ 00:32), 176 (07-27 @ 04:13), 185 (07-27 @ 00:34), 179 (07-26 @ 20:04), 223 (07-26 @ 15:42), 231 (07-26 @ 11:11)    INR: 1.21 07-29-21 @ 00:28, 1.32 07-28-21 @ 13:17, 2.23 07-27-21 @ 14:23, 2.84 07-26-21 @ 11:11  PTT: 35.5 07-26-21 @ 11:11         ABG - ( 29 Jul 2021 09:41 )  pH, Arterial: 7.39  pH, Blood: x     /  pCO2: 44    /  pO2: 181   / HCO3: 26    / Base Excess: 1.3   /  SaO2: 100         Aspartate Aminotransferase (AST/SGOT): 209 U/L (07-29-21 @ 00:28)  Alanine Aminotransferase (ALT/SGPT): 237 U/L (07-29-21 @ 00:28)  Aspartate Aminotransferase (AST/SGOT): 195 U/L (07-28-21 @ 00:32)  Alanine Aminotransferase (ALT/SGPT): 157 U/L (07-28-21 @ 00:32)      Radiology:   US Abdomen Upper Quadrant Right (07.28.21 @ 16:00)  IMPRESSION:  Gallbladder wall thickening and pericholecystic fluid, favored to be secondary to ascites in the setting of heart failure. However, if there is continued concern for acute cholecystitis further evaluation with nuclear medicine hepatobiliary scan can be considered.        Assessment:   66y old male  PMH ACC/AHA stage D HF due to NICM HM2 LVAD , TV annuloplasty ring 9/12/17 as destination therapy due to severe peripheral artery disease with significant stenosis  SIADH, Depression, CKD-3 with hyperkalemia, past E. coli UTIs, driveline drainage (1/7/21) and COVID-19 (back in April 2020).   This admission presents to [facility] weakness, fatigue, with findings of anemia + melena, with supratherapeutic INR (on  warfarin in the setting of an LVAD).     IR consulted on 7/29 for possible percutaneous cholecystostomy, in the setting of abdominal pain, increased LFTS, and abdominal US findings of Gallbladder wall thickening and pericholecystic fluid         Plan:  - Transaminitis, 7/28 ultrasound showing GB wall thickening w/ surrounding pericholecystic fluid  - IR will keep patient on procedure schedule on 7/30  for possible percutaneous cholecystostomy, will re-evaluate plan in am and follow up with team with patients overall clinical status overnight.  - Above plan d/w both primary team and IR Attending  - Please place order for IR Procedure, approving attending Dr. Rosa Maria Cole  - 7/29 Of note, pt noted to be having continued BM w +melena overnight, GI- following   - NPO past midnight  - hold AM therapeutic and prophylactic anticoagulants  - maintain active type and screen x 2  - Please draw AM labs - CBC/INR/BMP/LFTS  - NEED COVID TEST WITHIN 72HRS OF PROCEDURE.

## 2021-07-30 NOTE — PROGRESS NOTE ADULT - ATTENDING COMMENTS
Agree with above. Given sepsis/hypotension and his comorbidities and the intermittent nature of his bleeding, holding off on repeat endoscopic evaluation at this time.

## 2021-07-30 NOTE — CHART NOTE - NSCHARTNOTEFT_GEN_A_CORE
Case discussed with Dr. Thompson and with Jhoana CTBRIGITTE EUCEDA attempting to coordinate a meeting between palliative, CTU team and patient family.  Awaiting meeting set up, palliative remains available.  [phone]

## 2021-07-30 NOTE — PROGRESS NOTE ADULT - ASSESSMENT
Assessment and Recommendation:   · Assessment	  Assessment and recommendation :  Recurrent Acute hypoxic respiratory Failure S/P tracheostomy on full ventilatory  at 50% Fi02, /16/5 PEEP   Acute blood loss anemia S/P multiple  blood transfusion   septic shock off vasopressin , levophed and off  Dobutamine   sepsis with serratia marcescens in the blood on cefepime  S/P cholecystostomy tube placement by IR    RT pneumothorax Pig tail cathter in place   S/P Acute left lower lobe pneumonia   Aspiration pneumonia   Stool is  +ve for C-diff. colitis on PO vancomycin  improved  Non ischemic cardiomyopathy continue ACE inhibitor and B-Blockers   S/P Septic shock and cardiogenic shock   Stage D systolic heart failure S/P LVAD HM2   MH2 LVAD  with  TV Annuloplasty  Severe peripheral vascular disease   severe hyperglycemia on insulin coverage    cardiac arrythmia  on  Amiodarone   critical care polyneuropathy   Anemia of Acute blood Loss   S/P Small bowel bleeding   S/P blood and FFP transfusion   Chronic kidney disease stage III   NGT feeding on Jevity   PT and OT at bed side   GI prophylaxis with PPI   critical care time is 35 minutes

## 2021-07-30 NOTE — PROGRESS NOTE ADULT - SUBJECTIVE AND OBJECTIVE BOX
Patient seen and examined at bedside.    Overnight Events: Patient off pressors / inotropes, now s/p C tube w/ IR.           Current Meds:  acetaminophen    Suspension .. 650 milliGRAM(s) Oral every 6 hours PRN  aMIOdarone    Tablet 200 milliGRAM(s) Oral daily  cefepime   IVPB 1000 milliGRAM(s) IV Intermittent every 8 hours  chlorhexidine 0.12% Liquid 15 milliLiter(s) Oral Mucosa every 12 hours  chlorhexidine 2% Cloths 1 Application(s) Topical <User Schedule>  clonazePAM  Tablet 0.5 milliGRAM(s) Oral at bedtime  dexMEDEtomidine Infusion 0.5 MICROgram(s)/kG/Hr IV Continuous <Continuous>  insulin regular Infusion 3 Unit(s)/Hr IV Continuous <Continuous>  metroNIDAZOLE  IVPB 500 milliGRAM(s) IV Intermittent every 8 hours  octreotide  Injectable 50 MICROGram(s) IV Push every 8 hours  pantoprazole Infusion 8 mG/Hr IV Continuous <Continuous>  simethicone 80 milliGRAM(s) Chew every 8 hours PRN  sodium chloride 0.9%. 1000 milliLiter(s) IV Continuous <Continuous>  vancomycin    Solution 125 milliGRAM(s) Oral every 12 hours      Vitals:  T(F): 99.3 (07-30), Max: 100 (07-29)  HR: 70 (07-30) (56 - 108)  BP: --  RR: 20 (07-30)  SpO2: 100% (07-30)  I&O's Summary    29 Jul 2021 07:01  -  30 Jul 2021 07:00  --------------------------------------------------------  IN: 1730.8 mL / OUT: 2938 mL / NET: -1207.2 mL    30 Jul 2021 07:01  -  30 Jul 2021 12:33  --------------------------------------------------------  IN: 266.9 mL / OUT: 195 mL / NET: 71.9 mL    LVAD interrogation:  Pump Speed: 9200  Pump Flow: 5  Pulsatility Index: 5  Pump Power: 5  Events: no events   Driveline: clean, dry, intact  Programming changes: none    Physical Exam:  Appearance: No acute distress  Cardiovascular: RRR, S1, S2, no murmurs, rubs, or gallops; no edema; mod JVP  Respiratory: intubated breath sounds   Gastrointestinal: soft, non-tender, non-distended with normal bowel sounds  Musculoskeletal: No clubbing; no joint deformity   Neurologic: Non-focal  Lymphatic: No lymphadenopathy  Skin: No rashes, ecchymoses, or cyanosis                          7.6    11.13 )-----------( 104      ( 30 Jul 2021 01:18 )             24.1     07-30    135  |  101  |  13  ----------------------------<  141<H>  4.3   |  25  |  0.57    Ca    9.1      30 Jul 2021 00:58  Phos  1.5     07-30  Mg     1.9     07-30    TPro  6.4  /  Alb  3.0<L>  /  TBili  1.3<H>  /  DBili  x   /  AST  117<H>  /  ALT  210<H>  /  AlkPhos  224<H>  07-30    PT/INR - ( 29 Jul 2021 00:28 )   PT: 14.4 sec;   INR: 1.21 ratio

## 2021-07-30 NOTE — PROGRESS NOTE ADULT - SUBJECTIVE AND OBJECTIVE BOX
RICKY HAMPTON  MRN-[id number]  Patient is a 66y old  Male who presents with a chief complaint of Anemia, Supratherapeutic INR, Dark Stools (2021 13:09)    HPI:  65M PMH ACC/AHA stage D HF due to NICM HM2 LVAD , TV annuloplasty ring 17 as destination therapy due to severe peripheral artery disease with significant stenosis  SIADH, Depression, CKD-3 with hyperkalemia, past E. coli UTIs, driveline drainage (21) and COVID-19 (back in 2020)  He was recently seen in clinic where he complained of abdominal pain and dark stools w constipation back in May. He presents to [facility] ER today weakness and fatigue, moderate and + Black stools for three days, on coumadin secondary to warfarin use in the setting of an LVAD. Patient has required transfusions for GIB in the past. Mostly recently back in 2021 pt had anemia with dark stools. No interventions was done at that time. However Last Endoscopy was done in 2020 (negative). Today labs show patient is anemic with H/H of 4.5/16.3,. INR is 8.84 MAP in the 90s, Temp 35.1. He denies any chest pain, shortness of breath, dizziness, abd pain, nausea or vomiting.       (2021 16:57)      Surgery/Hospital course:   admit for melena w/ anemia, INR 8.84   6/15 Capsul study (+) for small bowel bleed, balloon endoscopy (old blood in prox ileum); post EGD - septic w/ L opacity, re-intubated for concern for aspiration, TTE (Mod MR, decrease biV w/ interventricular septum boweing towards R)   bronch    +C Diff    TC    CT C/A/P: Fluid filled colon which may be 2/2 rapid transit. Small bilateral pleffs with associates. Compressive atelectasis New ISABELLE & LLL  parenchymal opacities, suspicious for pneumonia. 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    #8 Shiley trach at bedside    CPAP trials    LVAD speed increased to 9200   Bronch; Central line dc'ed   TC since , continue as tolerated. Patient transferred to SDU.    Cont PT, no trach downsize today(#8cuffed)/ Anticoagulation/ Continue TF, speech f/u/ Vanco taper    oob to chair  INR  2.47  5 mg coumadin     increased lop to 25 q8  per HF   INR today 2.64.  H&H 7.3 this AM.  Will repeat CBC at noon, and will send stool guaiac Patient with persistent abdominal tenderness, rate of tube feeds decreased.  No nausea/vomiting.     INR today 2.4H&H 9.1/.6 low flow overnight /N&V  NPO resolved for capsule study  Coumadin on hold refusing Tube feeds on D5 / normal  @50 cc/hr   INR 2.69  H&H 7..1 refusing Tube feeds on D5 12 normal  @50 cc/hr. This am + BM Anusha Oneill HF  aware- PRBC x1  GI team consulted -  NPO  plan on study in am-  D/w Dr Cadet Patient  to return to CTU for further management; 1PRBCS    Post op INR 2.2 today.  No bleeding. BC + for SM.  Pt is hypotensive requiring pressor and inotropic support.  ID follow up today on Cefepime will follow.   R PTC for PTX    CT C/A/P: sub q emphysema in R chest wall, GGO RUL, small ascites CTH negative; Abd US: GB thickening, pericholecystic fluid      Today/Overnight:  Cholecystostomy tube this morning with IR and some black bile, will monitor site closely. As per GI, not stable for endoscopic evaluation currently. May consider enterscopy in the future once patient is stable. Fever of 100F.     Vital Signs Last 24 Hrs  T(C): 36.3 (2021 16:00), Max: 37.8 (2021 20:00)  T(F): 97.3 (2021 16:00), Max: 100 (2021 20:00)  HR: 73 (2021 19:00) (56 - 87)  BP: --  BP(mean): --  RR: 20 (2021 19:00) (16 - 66)  SpO2: 100% (2021 19:00) (98% - 100%)  ============================I/O===========================  I&O's Detail    2021 07:01  -  2021 07:00  --------------------------------------------------------  IN:    Dexmedetomidine: 176.4 mL    DOBUTamine: 5.9 mL    Enteral Tube Flush: 30 mL    Insulin: 15 mL    IV PiggyBack: 412.5 mL    IV PiggyBack: 300 mL    Miscellaneous Tube Feedin mL    Pantoprazole: 240 mL    sodium chloride 0.9%: 120 mL    Vasopressin: 1 mL  Total IN: 1730.8 mL    OUT:    Chest Tube (mL): 168 mL    Indwelling Catheter - Urethral (mL): 2770 mL  Total OUT: 2938 mL    Total NET: -1207.2 mL      2021 07:01  -  2021 19:35  --------------------------------------------------------  IN:    Dexmedetomidine: 117.6 mL    Insulin: 3 mL    IV PiggyBack: 50 mL    IV PiggyBack: 525 mL    Miscellaneous Tube Feedin mL    Pantoprazole: 120 mL    sodium chloride 0.9%: 120 mL  Total IN: 1035.6 mL    OUT:    Chest Tube (mL): 50 mL    Drain (mL): 30 mL    Indwelling Catheter - Urethral (mL): 1070 mL  Total OUT: 1150 mL    Total NET: -114.4 mL        ============================ LABS =========================                        7.6    11.13 )-----------( 104      ( 2021 01:18 )             24.1     30    135  |  101  |  13  ----------------------------<  141<H>  4.3   |  25  |  0.57    Ca    9.1      2021 00:58  Phos  1.5       Mg     1.9         TPro  6.4  /  Alb  3.0<L>  /  TBili  1.3<H>  /  DBili  x   /  AST  117<H>  /  ALT  210<H>  /  AlkPhos  224<H>      LIVER FUNCTIONS - ( 2021 00:58 )  Alb: 3.0 g/dL / Pro: 6.4 g/dL / ALK PHOS: 224 U/L / ALT: 210 U/L / AST: 117 U/L / GGT: x           PT/INR - ( 2021 00:28 )   PT: 14.4 sec;   INR: 1.21 ratio           ABG - ( 2021 00:54 )  pH, Arterial: 7.49  pH, Blood: x     /  pCO2: 38    /  pO2: 168   / HCO3: 28    / Base Excess: 4.9   /  SaO2: 100                 ======================Micro/Rad/Cardio=================  Culture: Reviewed   CXR: Reviewed  Echo: Reviewed  ======================================================  PAST MEDICAL & SURGICAL HISTORY:  CHF (congestive heart failure)    CAD (coronary artery disease)    Depression    Pleural effusion    History of 2019 novel coronavirus disease (COVID-19)  2020    Hemorrhoids    Bleeding hemorrhoids    Peripheral arterial disease    Claudication    BPH with urinary obstruction    ACC/AHA stage D systolic heart failure    Anticoagulation goal of INR 2.0 to 2.5    Falls    Clavicle fracture    CKD (chronic kidney disease), stage III    Iron deficiency anemia    H/O epistaxis    Vertigo    GI bleed    S/P TVR (tricuspid valve repair)    S/P ventricular assist device    S/P endoscopy      ========================ASSESSMENT ================  Stage D Nonischemic Cardiomyopathy, Status Post HM2 on 2017    Cardiogenic shock  Hemodynamic instability   Acute hypoxemic respiratory failure  GI bleed , Status Post Enteroscopy   Anemia, in setting of melena   Thrombocytopenia   Leukocytosis   Chronic Kidney Disease  Stress hyperglycemia   C.diff positive on    Hypovolemic shock    Plan:  ====================== NEUROLOGY=====================  CTH on  with no acute intracranial hemorrhage or acute territorial infarction   Sedated with IV Precedex to maintain vent synchrony   Tylenol PRN for analgesia   C/w clonazepam for agitation    acetaminophen    Suspension .. 650 milliGRAM(s) Oral every 6 hours PRN Mild Pain (1 - 3)  clonazePAM  Tablet 0.5 milliGRAM(s) Oral at bedtime  dexMEDEtomidine Infusion 0.5 MICROgram(s)/kG/Hr (9.81 mL/Hr) IV Continuous <Continuous>    ==================== RESPIRATORY======================  Acute hypoxemic respiratory failure s/p #8 shiley trach on ; CPAP/TC vent weaning as tolerated   Requiring full vent support, continue close monitoring of respiratory rate and breathing pattern, following of ABG’s with A-line monitoring, continuous pulse oximetry monitoring.   R PTC placed on  for mod R PTX, continue to monitor output.   CT chest : Sub q emphysema in R chest wall, GGO RUL     Mechanical Ventilation:  Mode: AC/ CMV (Assist Control/ Continuous Mandatory Ventilation)  RR (machine): 20  TV (machine): 500  FiO2: 40  PEEP: 5  ITime: 1  MAP: 10  PIP: 30      ====================CARDIOVASCULAR==================  Stage D Nonischemic Cardiomyopathy, Status Post HM2 on 2017; LVAD settings 9200 with flow of 4.9  TTE : reveals at least moderate MR, mild AI, severe global LV syst dysfxn, dec RV fxn.  Continue invasive hemodynamic monitoring   Rate control with amiodarone     aMIOdarone    Tablet 200 milliGRAM(s) Oral daily    ===================HEMATOLOGIC/ONC ===================  Anemia due to acute blood loss associated with upper GI bleed   Thrombocytopenia, 104k  Monitor H&H/Plts     ===================== RENAL =========================  Continue monitoring urine output, I&OS, BUN/Cr   Replete lytes PRN. Keep K> 4 and Mg >2   Continue diuresis prn for goal CVP 8-10.     ==================== GASTROINTESTINAL===================  Tolerating Jevity 1.2 shantelle TF, @ goal 60 cc/hr.   GI bleed in setting of recent melena, GI following - continue Protonix drip   As per GI, not stable for endoscopic evaluation, transfuse prn and f/u repeat CBC. May consider enteroscopy when stabilized.   CT A/P on : small ascites; US yesterday showing GB wall thickening w/ surrounding pericholecystic fluid  S/p cholecystostomy tube this AM with IR with -60cc of black bile, will monitor site closely.   C.diff + on , continue vanco   Simethicone PRN dyspepsia     pantoprazole Infusion 8 mG/Hr (10 mL/Hr) IV Continuous <Continuous>  simethicone 80 milliGRAM(s) Chew every 8 hours PRN Dyspepsia  sodium chloride 0.9%. 1000 milliLiter(s) (5 mL/Hr) IV Continuous <Continuous>    =======================    ENDOCRINE  =====================  Stress hyperglycemia, continue glucose control with IV insulin drip     insulin regular Infusion 3 Unit(s)/Hr (3 mL/Hr) IV Continuous <Continuous>  octreotide  Injectable 50 MICROGram(s) IV Push every 8 hours    ========================INFECTIOUS DISEASE================  Temp 99.3F-> 100F, WBC downtrending 12.47->11.13  Monitor temperature and trend WBC   BCx+  +Serratia marcescens c/w cefepime and metronidazole   C.diff + on , continue vanco     cefepime   IVPB 1000 milliGRAM(s) IV Intermittent every 8 hours  metroNIDAZOLE  IVPB 500 milliGRAM(s) IV Intermittent every 8 hours  vancomycin    Solution 125 milliGRAM(s) Oral every 12 hours      Patient requires continuous monitoring with bedside rhythm monitoring, pulse oximetry monitoring, and continuous central venous and arterial pressure monitoring; and intermittent blood gas analysis.  Care plan discussed with ICU care team.    Patient remained critical, at risk for life threatening decompensation.   I have spent 35 minutes providing acute care with multiple re-evaluations throughout the evening.     By signing my name below, I, Daniela Chowdary, attest that this documentation has been prepared under the direction and in the presence of CLAUDIA Mejia.  Electronically signed: Cesia Suresh, 21 @ 19:35    I, CLAUDIA Mejia, personally performed the services described in this documentation. All medical record entries made by the luisibe were at my direction and in my presence. I have reviewed the chart and agree that the record reflects my personal performance and is accurate and complete  Electronically signed: CLAUDIA Mejia, 21 @ 19:35       RICKY HAMPTON  MRN-[id number]  Patient is a 66y old  Male who presents with a chief complaint of Anemia, Supratherapeutic INR, Dark Stools (2021 13:09)    HPI:  65M PMH ACC/AHA stage D HF due to NICM HM2 LVAD , TV annuloplasty ring 17 as destination therapy due to severe peripheral artery disease with significant stenosis  SIADH, Depression, CKD-3 with hyperkalemia, past E. coli UTIs, driveline drainage (21) and COVID-19 (back in 2020)  He was recently seen in clinic where he complained of abdominal pain and dark stools w constipation back in May. He presents to [facility] ER today weakness and fatigue, moderate and + Black stools for three days, on coumadin secondary to warfarin use in the setting of an LVAD. Patient has required transfusions for GIB in the past. Mostly recently back in 2021 pt had anemia with dark stools. No interventions was done at that time. However Last Endoscopy was done in 2020 (negative). Today labs show patient is anemic with H/H of 4.5/16.3,. INR is 8.84 MAP in the 90s, Temp 35.1. He denies any chest pain, shortness of breath, dizziness, abd pain, nausea or vomiting.     (2021 16:57)    Surgery/Hospital course:   admit for melena w/ anemia, INR 8.84   6/15 Capsul study (+) for small bowel bleed, balloon endoscopy (old blood in prox ileum); post EGD - septic w/ L opacity, re-intubated for concern for aspiration, TTE (Mod MR, decrease biV w/ interventricular septum boweing towards R)   bronch    +C Diff    TC    CT C/A/P: Fluid filled colon which may be 2/2 rapid transit. Small bilateral pleffs with associates. Compressive atelectasis New ISABELLE & LLL  parenchymal opacities, suspicious for pneumonia. 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    #8 Shiley trach at bedside    CPAP trials    LVAD speed increased to 9200   Bronch; Central line dc'ed   TC since , continue as tolerated. Patient transferred to SDU.    Cont PT, no trach downsize today(#8cuffed)/ Anticoagulation/ Continue TF, speech f/u/ Vanco taper    oob to chair  INR  2.47  5 mg coumadin     increased lop to 25 q8  per HF   INR today 2.64.  H&H 7.3/24 this AM.  Will repeat CBC at noon, and will send stool guaiac Patient with persistent abdominal tenderness, rate of tube feeds decreased.  No nausea/vomiting.     INR today 2.4H&H 9.1/.6 low flow overnight /N&V  NPO resolved for capsule study  Coumadin on hold refusing Tube feeds on D5 / normal  @50 cc/hr   INR 2.69  H&H 7..1 refusing Tube feeds on D5 12 normal  @50 cc/hr. This am + BM Anusha Oneill HF  aware- PRBC x1  GI team consulted -  NPO  plan on study in am-  D/w Dr Cadet Patient  to return to CTU for further management; 1PRBCS    Post op INR 2.2 today.  No bleeding. BC + for SM.  Pt is hypotensive requiring pressor and inotropic support.  ID follow up today on Cefepime will follow.   R PTC for PTX    CT C/A/P: sub q emphysema in R chest wall, GGO RUL, small ascites CTH negative; Abd US: GB thickening, pericholecystic fluid      Today/Overnight:  Cholecystostomy tube this morning with IR and some black bile, will monitor site closely. As per GI, not stable for endoscopic evaluation currently. May consider enterscopy in the future once patient is stable. Fever of 100F.     Vital Signs Last 24 Hrs  T(C): 36.3 (2021 16:00), Max: 37.8 (2021 20:00)  T(F): 97.3 (2021 16:00), Max: 100 (2021 20:00)  HR: 73 (2021 19:00) (56 - 87)  BP(mean): 69  RR: 20 (2021 19:00) (16 - 66)  SpO2: 100% (2021 19:00) (98% - 100%)  ============================I/O===========================  I&O's Detail    2021 07:01  -  2021 07:00  --------------------------------------------------------  IN:    Dexmedetomidine: 176.4 mL    DOBUTamine: 5.9 mL    Enteral Tube Flush: 30 mL    Insulin: 15 mL    IV PiggyBack: 412.5 mL    IV PiggyBack: 300 mL    Miscellaneous Tube Feedin mL    Pantoprazole: 240 mL    sodium chloride 0.9%: 120 mL    Vasopressin: 1 mL  Total IN: 1730.8 mL    OUT:    Chest Tube (mL): 168 mL    Indwelling Catheter - Urethral (mL): 2770 mL  Total OUT: 2938 mL    Total NET: -1207.2 mL    2021 07:01  -  2021 19:35  --------------------------------------------------------  IN:    Dexmedetomidine: 117.6 mL    Insulin: 3 mL    IV PiggyBack: 50 mL    IV PiggyBack: 525 mL    Miscellaneous Tube Feedin mL    Pantoprazole: 120 mL    sodium chloride 0.9%: 120 mL  Total IN: 1035.6 mL    OUT:    Chest Tube (mL): 50 mL    Drain (mL): 30 mL    Indwelling Catheter - Urethral (mL): 1070 mL  Total OUT: 1150 mL    Total NET: -114.4 mL    ============================ LABS =========================                        7.6    11.13 )-----------( 104      ( 2021 01:18 )             24.1     135  |  101  |  13  ----------------------------<  141<H>  4.3   |  25  |  0.57    Ca    9.1      2021 00:58  Phos  1.5     07-  Mg     1.9     -30    TPro  6.4  /  Alb  3.0<L>  /  TBili  1.3<H>  /  DBili  x   /  AST  117<H>  /  ALT  210<H>  /  AlkPhos  224<H>  30    LIVER FUNCTIONS - ( 2021 00:58 )  Alb: 3.0 g/dL / Pro: 6.4 g/dL / ALK PHOS: 224 U/L / ALT: 210 U/L / AST: 117 U/L / GGT: x           PT/INR - ( 2021 00:28 )   PT: 14.4 sec;   INR: 1.21 ratio      ABG - ( 2021 00:54 )  pH, Arterial: 7.49  pH, Blood: x     /  pCO2: 38    /  pO2: 168   / HCO3: 28    / Base Excess: 4.9   /  SaO2: 100       ======================Micro/Rad/Cardio=================  Culture: Reviewed   CXR: Reviewed  Echo: Reviewed    ======================================================  PAST MEDICAL & SURGICAL HISTORY:  CHF (congestive heart failure)    CAD (coronary artery disease)    Depression    Pleural effusion    History of 2019 novel coronavirus disease (COVID-19)  2020    Hemorrhoids    Bleeding hemorrhoids    Peripheral arterial disease    Claudication    BPH with urinary obstruction    ACC/AHA stage D systolic heart failure    Anticoagulation goal of INR 2.0 to 2.5    Falls    Clavicle fracture    CKD (chronic kidney disease), stage III    Iron deficiency anemia    H/O epistaxis    Vertigo    GI bleed    S/P TVR (tricuspid valve repair)    S/P ventricular assist device    S/P endoscopy    ========================ASSESSMENT ================  Stage D Nonischemic Cardiomyopathy, Status Post HM2 on 2017    Cardiogenic shock  Hemodynamic instability   Acute hypoxemic respiratory failure  GI bleed , Status Post Enteroscopy   Anemia, in setting of melena   Thrombocytopenia   Leukocytosis   Chronic Kidney Disease  Stress hyperglycemia   C.diff positive on    Hypovolemic shock    Plan:  ====================== NEUROLOGY=====================  CTH on  with no acute intracranial hemorrhage or acute territorial infarction   Sedated with IV Precedex to maintain vent synchrony   Tylenol PRN for analgesia   C/w clonazepam for agitation    acetaminophen    Suspension .. 650 milliGRAM(s) Oral every 6 hours PRN Mild Pain (1 - 3)  clonazePAM  Tablet 0.5 milliGRAM(s) Oral at bedtime  dexMEDEtomidine Infusion 0.5 MICROgram(s)/kG/Hr (9.81 mL/Hr) IV Continuous <Continuous>    ==================== RESPIRATORY======================  Acute hypoxemic respiratory failure s/p #8 shiley trach on ; CPAP/TC vent weaning as tolerated. Patient was unable to do any vent weaning today 2/2 perc dawn.  Requiring full vent support, continue close monitoring of respiratory rate and breathing pattern, following of ABG’s with A-line monitoring, continuous pulse oximetry monitoring.   R PTC placed on  for mod R PTX, continue to monitor output.   CT chest : Sub q emphysema in R chest wall, GGO RUL     Mechanical Ventilation:  Mode: AC/ CMV (Assist Control/ Continuous Mandatory Ventilation)  RR (machine): 20  TV (machine): 500  FiO2: 40  PEEP: 5  ITime: 1  MAP: 10  PIP: 30    ====================CARDIOVASCULAR==================  Stage D Nonischemic Cardiomyopathy, Status Post HM2 on 2017; LVAD settings 9200 with flow of 4.9  TTE : reveals at least moderate MR, mild AI, severe global LV syst dysfxn, dec RV fxn.  Continue invasive hemodynamic monitoring   Rate control with amiodarone     aMIOdarone    Tablet 200 milliGRAM(s) Oral daily    ===================HEMATOLOGIC/ONC ===================  Anemia due to acute blood loss associated with upper GI bleed   Thrombocytopenia, 104k  Monitor H&H/Plts     ===================== RENAL =========================  Continue monitoring urine output, I&OS, BUN/Cr   Replete lytes PRN. Keep K> 4 and Mg >2   Continue diuresis prn for goal CVP 8-10.     ==================== GASTROINTESTINAL===================  Tolerating Jevity 1.2 shantelle TF, @ goal 60 cc/hr.   GI bleed in setting of recent melena, GI following - continue Protonix drip   As per GI, not stable for endoscopic evaluation, transfuse prn and f/u repeat CBC. May consider enteroscopy when stabilized.   CT A/P on : small ascites; US yesterday showing GB wall thickening w/ surrounding pericholecystic fluid  S/p cholecystostomy tube this AM with IR with -60cc of black bile, will monitor site closely.   C.diff + on , continue vanco   Simethicone PRN dyspepsia     pantoprazole Infusion 8 mG/Hr (10 mL/Hr) IV Continuous <Continuous>  simethicone 80 milliGRAM(s) Chew every 8 hours PRN Dyspepsia  sodium chloride 0.9%. 1000 milliLiter(s) (5 mL/Hr) IV Continuous <Continuous>    =======================    ENDOCRINE  =====================  Stress hyperglycemia, continue glucose control with IV insulin drip     insulin regular Infusion 3 Unit(s)/Hr (3 mL/Hr) IV Continuous <Continuous>  octreotide  Injectable 50 MICROGram(s) IV Push every 8 hours    ========================INFECTIOUS DISEASE================  Temp 99.3F-> 100F, WBC downtrending 12.47->11.13  Monitor temperature and trend WBC   BCx+  +Serratia marcescens c/w cefepime and metronidazole. Per ID, will consider adding bactrim if clinical condition worsens.   C.diff + on , continue vanco     cefepime   IVPB 1000 milliGRAM(s) IV Intermittent every 8 hours  metroNIDAZOLE  IVPB 500 milliGRAM(s) IV Intermittent every 8 hours  vancomycin    Solution 125 milliGRAM(s) Oral every 12 hours    Patient requires continuous monitoring with bedside rhythm monitoring, pulse oximetry monitoring, and continuous central venous and arterial pressure monitoring; and intermittent blood gas analysis.  Care plan discussed with ICU care team.    Patient remained critical, at risk for life threatening decompensation.   I have spent 35 minutes providing acute care with multiple re-evaluations throughout the evening.     By signing my name below, I, Daniela Chowdary, attest that this documentation has been prepared under the direction and in the presence of CLAUDIA Mejia.  Electronically signed: Cesia Suresh, 21 @ 19:35    I, CLAUDIA Mejia, personally performed the services described in this documentation. All medical record entries made by the luisibmel were at my direction and in my presence. I have reviewed the chart and agree that the record reflects my personal performance and is accurate and complete  Electronically signed: CLAUDIA Mejia, 21 @ 19:35

## 2021-07-30 NOTE — PROGRESS NOTE ADULT - ATTENDING COMMENTS
Patient seen and examined with Dr Hall    I agree with his interval history and exam as noted above    s/p perc cholecystotomy tube with dark biliary sanguinous drainage  cultures pending of the fluid    blood cultures with clearance of serratia bacteremia on Cefepime/flagyl  continue current antibiotics  will plan 7-14 day course depending upon biliary findings    Morris Prater MD  [phone]  After 5pm/weekends [phone]

## 2021-07-30 NOTE — PROGRESS NOTE ADULT - PROBLEM SELECTOR PLAN 1
- holding metoprolol in setting of sepsis   - CVP slightly elevated today, 20mg IV lasix for goal slight negative   - Continue home amiodarone 200 mg PO daily  - currently doing well off dobutamine   - CVL in place, trend central sats

## 2021-07-30 NOTE — PROGRESS NOTE ADULT - SUBJECTIVE AND OBJECTIVE BOX
RICKY JOINT  MRN#: [id number]  Subjective:  Pulmonary progress  : recurrent Acute hypoxic respiratory Failure ,aspiration pneumonia, NICM  , chart reviewed and H/O obtained radiological and Laboratory study reviewed patient Examined     65M PMH ACC/AHA stage D HF due to NICM HM2 LVAD , TV annuloplasty ring 17 as destination therapy due to severe peripheral artery disease with significant stenosis  SIADH, Depression, CKD-3 with hyperkalemia, past E. coli UTIs, driveline drainage (21) and COVID-19 (back in 2020)  He was recently seen in clinic where he complained of abdominal pain and dark stools w constipation back in May. He presents to [facility] ER today weakness and fatigue, moderate and + Black stools for three days, on coumadin secondary to warfarin use in the setting of an LVAD. Patient has required transfusions for GIB in the past. Mostly recently back in 2021 pt had anemia with dark stools. No interventions was done at that time. However Last Endoscopy was done in 2020 (negative). Today labs show patient is anemic with H/H of 4.5/16.3,. INR is 8.84 MAP in the 90s, Temp 35.1. He denies any chest pain, shortness of breath, dizziness, abd pain, nausea or vomiting. found to have  rectal bleeding underwent endoscopy ,old blood in the proximal ileum ,  develop sepsis with LL opacity given Antibiotics , Extubated , reintubated , Bronchoscopy on Zosyn for LL pneumonia  and Amiodarone S/P TV Annuloplasty , patient remain intubated on full ventilatory support .S/P multiple units of blood transfusion , remain on full ventilatory support on Precedex and propofol , new central IJ line , diarrhea C diff. +ve on po vancomycin and IV Flagyl,  mildly distended belly , fever start on cefepime 2gm q 8 hrs S/P tracheostomy .  new RT Subclavian central line continue on contact  isolation ,still diarrhea on C-diff antibiotics ENT follow up appreciated , trial of C-PAP as tolerated , , copious secretion from trach. site chest x ray left lower lobe pneumonia , tolerating trch. collar 50% FI02 still excessive secretion need pulmonary toilet , off Ancef antibiotic , no more diarrhea back on full support mechanical ventilator , chest x ray show improvement in LLL air space disease, more awake and responsive on tube feeding no more diarrhea , S/P  Ancef for bacteremia no fever ,ID follow up noted ,  no nausea or vomiting or diarrhea still very weak and tired , event note pulled NG tube now replaced , back on tube feeding ,still on po vancomycin , getting PT and OT at bed side , no plan for decannulation for now. , no more diarrhea receiving PT and OPT at bed side , minimal secretion from tracheostomy site , no SOB getting stronger , improve muscle tone patient transfer to monitor bed still on contact isolation for C-Difficel colitis on 50% FI02, NG tube clogged , and change to resume tube feeding still loose stool . H/H drop significantly require blood transfusion , most likely GI bleeding , IV heparin D/C , vomiting 200 cc of creamy color tube feeding on hold no sob, still melena monitor in the CTU possible capsule endoscopy , H/H is stable ., patient develop TR sided  pneumothorax require chest tube placement , RT IJ central line  placed , develop fever shaking chills , blood culture positive for serratia marcescens , start on cefepime .the patient  become hypoxic and hypotensive placed on full ventilatory support and Vasopressin , levophed and Dobutamine ,S/P blood transfusion on meropenem and vancomycin , IR note appreciated   no IR drainage for Gall bladder , on and  off pressors , occasional agitation on Precedex .S/P IR cholecystostomy tube drainage placement in the RT upper Quadrant          (2021 16:57)    PAST MEDICAL & SURGICAL HISTORY:  CHF (congestive heart failure)    CAD (coronary artery disease)  Depression    Pleural effusion    History of 2019 novel coronavirus disease (COVID-19)  2020    Hemorrhoids    Bleeding hemorrhoids    Peripheral arterial disease    Claudication    BPH with urinary obstruction    ACC/AHA stage D systolic heart failure  Anticoagulation goal of INR 2.0 to 2.5    Falls    Clavicle fracture    CKD (chronic kidney disease), stage III    Iron deficiency anemia    H/O epistaxis    Vertigo    GI bleed    S/P TVR (tricuspid valve repair)    S/P ventricular assist device    S/P endoscopy    OBJECTIVE:  ICU Vital Signs Last 24 Hrs  T(C): 38.2 (2021 10:00), Max: 38.5 (2021 12:00)  T(F): 100.8 (2021 10:00), Max: 101.3 (2021 12:00)  HR: 65 (2021 10:00) (61 - 69)  BP: --  BP(mean): --  ABP: 105/67 (2021 10:00) (90/54 - 113/64)  ABP(mean): 77 (2021 10:00) (63 - 77)  RR: 20 (2021 10:00) (19 - 35)  SpO2: 99% (2021 10:00) (96% - 100%)       @ 07:  -   @ 07:00  --------------------------------------------------------  IN: 2693.9 mL / OUT: 1415 mL / NET: 1278.9 mL     @ 07:01  -   @ 10:49  --------------------------------------------------------  IN: 420.8 mL / OUT: 115 mL / NET: 305.8 mL  PHYSICAL EXAM:Daily   Elderly male S/P tracheostomy   on  full ventilatory support on  50% FI02 lethargic on Precedex , off pressor    Daily Weight in k.4 (2021 00:00)  HEENT:     + NCAT  + EOMI  - Conjuctival edema   - Icterus   - Thrush   - ETT  + NGT/OGT  Neck:         + FROM   RT IJ line  JVD     - Nodes     - Masses    + Mid-line trachea + Tracheostomy  Chest:            RT pig tail cathter in place   Lungs:          + CTA   + Rhonchi    - Rales    - Wheezing + Decreased  LT BS   - Dullness R L  Cardiac:       + S1 + S2    + RRR   - Irregular   - S3  - S4    - Murmurs   - Rub   - Hamman’s sign   Abdomen:    + BS  + Soft + Non-tender - Distended - Organomegaly - PEG .cholecystostomy tube in place  Extremities:   - Cyanosis U/L   - Clubbing  U/L  + LE/UE Edema   + Capillary refill    + Pulses   Neuro:        - Awake   -  Alert   - Confused   - Lethargic   + Sedated  + Generalized Weakness  Skin:        - Rashes    - Erythema   + Normal incisions   + IV sites intact          HOSPITAL MEDICATIONS: All medications reviewed and analyzed  MEDICATIONS  (STANDING):  amiodarone    Tablet 200 milliGRAM(s) Oral daily  chlorhexidine 0.12% Liquid 15 milliLiter(s) Oral Mucosa every 12 hours  chlorhexidine 2% Cloths 1 Application(s) Topical <User Schedule>  dexmedetomidine Infusion 0.5 MICROgram(s)/kG/Hr (9.81 mL/Hr) IV Continuous <Continuous>  dextrose 50% Injectable 50 milliLiter(s) IV Push every 15 minutes  heparin  Infusion 400 Unit(s)/Hr (12.5 mL/Hr) IV Continuous <Continuous>  Hydromorphone  Injectable 0.5 milliGRAM(s) IV Push once  insulin lispro (ADMELOG) corrective regimen sliding scale   SubCutaneous every 6 hours  pantoprazole  Injectable 40 milliGRAM(s) IV Push every 12 hours  piperacillin/tazobactam IVPB.. 3.375 Gram(s) IV Intermittent every 8 hours  propofol Infusion 20 MICROgram(s)/kG/Min (9.42 mL/Hr) IV Continuous <Continuous>  sodium chloride 0.9% lock flush 3 milliLiter(s) IV Push every 8 hours  sodium chloride 0.9%. 1000 milliLiter(s) (10 mL/Hr) IV Continuous <Continuous>    MEDICATIONS  (PRN):  acetaminophen    Suspension .. 650 milliGRAM(s) Oral every 6 hours PRN Temp greater or equal to 38C (100.4F)    LABS: All Lab data reviewed and analyzed                         7.6    11.13 )-----------( 104      ( 2021 01:18 )      135  |  101  |  13  ----------------------------<  141<H>  4.3   |  25  |  0.57    Ca    9.1      2021 00:58  Phos  1.5       Mg     1.9         TPro  6.4  /  Alb  3.0<L>  /  TBili  1.3<H>  /  DBili  x   /  AST  117<H>  /  ALT  210<H>  /  AlkPhos  224<H>                 24.1   Ca    8.4      2021 00:28  Phos  0.9       Mg     2.2         TPro  6.0  /  Alb  2.9<L>  /  TBili  0.9  /  DBili  x   /  AST  209<H>  /  ALT  237<H>  /  AlkPhos  171<H>        Ca    8.6      2021 00:32  Phos  1.9       Mg     1.8         TPro  6.2  /  Alb  3.1<L>  /  TBili  1.7<H>  /  DBili  x   /  AST  195<H>  /  ALT  157<H>  /  AlkPhos  131<H>                                                              PTT - ( 2021 04:52 )  PTT:45.2 sec LIVER FUNCTIONS - ( 2021 00:42 )  Alb: 3.4 g/dL / Pro: 6.7 g/dL / ALK PHOS: 213 U/L / ALT: 15 U/L / AST: 24 U/L / GGT: x           RADIOLOGY: - Reviewed and analyzed RT Pig tail cathter  , LVAD HM2, CT scan of abdomen reviewed result noted

## 2021-07-30 NOTE — PROGRESS NOTE ADULT - ASSESSMENT
65 YO M with a history of stage D NICM s/p HM2 on 9/2017 as DT (due to severe PAD) with TV ring, prior COVID-19 infection 4/2020, recurrent syncope post LVAD s/p ILR, and chronic abdominal pain with prior negative workup who was admitted with symptomatic anemia with Hgb 4.5 in setting of INR 8.8 without hemodynamic instability. He was transfused and underwent VCE which showed active bleeding in the mid small bowel but subsequent enteroscopy 6/15 did not reveal any active bleeding. He acutely decompensated after procedure with fever/hypertension, low flow alarms, and pulmonary infiltrate with hypoxia requiring intubation from probable aspiration PNA. His LDH is normal and there are no signs of overt LVAD dysfunction on echo though signs of insufficiently unloaded ventricle for which his speed has been increased. Course notable for inability to wean ventilator with persistent secretions for which he underwent tracheostomy as well as acute c diff colitis. Worsening anemia over last few days requiring transfusion with low flow alarms, concerning for recurrent GI bleed. Now w/ hypotension and fevers c/f sepsis, Cxs pos for serratia s/p C tube w/ IR.

## 2021-07-30 NOTE — PROGRESS NOTE ADULT - SUBJECTIVE AND OBJECTIVE BOX
Patient is a 66y old  Male who presents with a chief complaint of Anemia, Supratherapeutic INR, Dark Stools (30 Jul 2021 12:32)      Interval Events:   Successful perc dawn tube placed this morning.  Nephew at bedside this afternoon.  No further episodes of overt GI bleeding.      Hospital Medications:  acetaminophen    Suspension .. 650 milliGRAM(s) Oral every 6 hours PRN  aMIOdarone    Tablet 200 milliGRAM(s) Oral daily  cefepime   IVPB 1000 milliGRAM(s) IV Intermittent every 8 hours  chlorhexidine 0.12% Liquid 15 milliLiter(s) Oral Mucosa every 12 hours  chlorhexidine 2% Cloths 1 Application(s) Topical <User Schedule>  clonazePAM  Tablet 0.5 milliGRAM(s) Oral at bedtime  dexMEDEtomidine Infusion 0.5 MICROgram(s)/kG/Hr IV Continuous <Continuous>  insulin regular Infusion 3 Unit(s)/Hr IV Continuous <Continuous>  metroNIDAZOLE  IVPB 500 milliGRAM(s) IV Intermittent every 8 hours  octreotide  Injectable 50 MICROGram(s) IV Push every 8 hours  pantoprazole Infusion 8 mG/Hr IV Continuous <Continuous>  simethicone 80 milliGRAM(s) Chew every 8 hours PRN  sodium chloride 0.9%. 1000 milliLiter(s) IV Continuous <Continuous>  vancomycin    Solution 125 milliGRAM(s) Oral every 12 hours      PHYSICAL EXAM:   Vital Signs:  Vital Signs Last 24 Hrs  T(C): 37.4 (30 Jul 2021 12:00), Max: 37.8 (29 Jul 2021 20:00)  T(F): 99.3 (30 Jul 2021 12:00), Max: 100 (29 Jul 2021 20:00)  HR: 74 (30 Jul 2021 13:00) (56 - 108)  BP: --  BP(mean): --  RR: 20 (30 Jul 2021 13:00) (16 - 84)  SpO2: 100% (30 Jul 2021 13:00) (96% - 100%)  Daily Height in cm: 177.8 (30 Jul 2021 07:47)    Daily     GENERAL: no acute distress, resting comfortably  NEURO: not alert/oriented  HEENT: anicteric sclera, no conjunctival pallor appreciated  CHEST: on ventilatory, no accessory muscle use  CARDIAC: regular rate, +S1/S2  ABDOMEN: soft, nondistended, nontender, no rebound or guarding  EXTREMITIES: warm, well perfused, no edema  SKIN: no lesions noted    LABS: reviewed                        7.6    11.13 )-----------( 104      ( 30 Jul 2021 01:18 )             24.1     07-30    135  |  101  |  13  ----------------------------<  141<H>  4.3   |  25  |  0.57    Ca    9.1      30 Jul 2021 00:58  Phos  1.5     07-30  Mg     1.9     07-30    TPro  6.4  /  Alb  3.0<L>  /  TBili  1.3<H>  /  DBili  x   /  AST  117<H>  /  ALT  210<H>  /  AlkPhos  224<H>  07-30    LIVER FUNCTIONS - ( 30 Jul 2021 00:58 )  Alb: 3.0 g/dL / Pro: 6.4 g/dL / ALK PHOS: 224 U/L / ALT: 210 U/L / AST: 117 U/L / GGT: x             Interval Diagnostic Studies: see sunrise for full report

## 2021-07-30 NOTE — PROGRESS NOTE ADULT - SUBJECTIVE AND OBJECTIVE BOX
RICKY HAMPTON  MRN-[id number]  Patient is a 66y old  Male who presents with a chief complaint of Anemia, Supratherapeutic INR, Dark Stools (2021 18:37)    HPI:  65M PMH ACC/AHA stage D HF due to NICM HM2 LVAD , TV annuloplasty ring 17 as destination therapy due to severe peripheral artery disease with significant stenosis  SIADH, Depression, CKD-3 with hyperkalemia, past E. coli UTIs, driveline drainage (21) and COVID-19 (back in 2020)  He was recently seen in clinic where he complained of abdominal pain and dark stools w constipation back in May. He presents to [facility] ER today weakness and fatigue, moderate and + Black stools for three days, on coumadin secondary to warfarin use in the setting of an LVAD. Patient has required transfusions for GIB in the past. Mostly recently back in 2021 pt had anemia with dark stools. No interventions was done at that time. However Last Endoscopy was done in 2020 (negative). Today labs show patient is anemic with H/H of 4.5/16.3,. INR is 8.84 MAP in the 90s, Temp 35.1. He denies any chest pain, shortness of breath, dizziness, abd pain, nausea or vomiting.       (2021 16:57)      Surgery/Hospital Course:   admit for melena w/ anemia, INR 8.84   6/15 Capsul study (+) for small bowel bleed, balloon endoscopy (old blood in prox ileum); post EGD - septic w/ L opacity, re-intubated for concern for aspiration, TTE (Mod MR, decrease biV w/ interventricular septum boweing towards R)   bronch    +C Diff    TC    CT C/A/P: Fluid filled colon which may be 2/2 rapid transit. Small bilateral pleffs with associates. Compressive atelectasis New ISABELLE & LLL  parenchymal opacities, suspicious for pneumonia. 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    #8 Shiley trach at bedside    CPAP trials    LVAD speed increased to 9200   Bronch; Central line dc'ed   TC since , continue as tolerated. Patient transferred to SDU.    Cont PT, no trach downsize today(#8cuffed)/ Anticoagulation/ Continue TF, speech f/u/ Vanco taper    oob to chair  INR  2.47  5 mg coumadin     increased lop to 25 q8  per HF   INR today 2.64.  H&H 7.3 this AM.  Will repeat CBC at noon, and will send stool guaiac Patient with persistent abdominal tenderness, rate of tube feeds decreased.  No nausea/vomiting.     INR today 2.4H&H 9.1/.6 low flow overnight /N&V  NPO resolved for capsule study  Coumadin on hold refusing Tube feeds on D5 1/ normal  @50 cc/hr   INR 2.69  H&H 7..1 refusing Tube feeds on D5 1/2 normal  @50 cc/hr. This am + BM Anusha Oneill HF  aware- PRBC x1  GI team consulted -  NPO  plan on study in am-  D/w Dr Cadet Patient  to return to CTU for further management; 1PRBCS    Post op INR 2.2 today.  No bleeding. BC + for SM.  Pt is hypotensive requiring pressor and inotropic support.  ID follow up today on Cefepime will follow.   R PTC for PTX    CT C/A/P: sub q emphysema in R chest wall, GGO RUL, small ascites CTH negative; Abd US: GB thickening, pericholecystic fluid      Today:    REVIEW OF SYSTEMS:  Unable to obtain secondary to pt being trached     ICU Vital Signs Last 24 Hrs  T(C): 37.4 (2021 07:47), Max: 37.8 (2021 20:00)  T(F): 99.3 (2021 04:00), Max: 100 (2021 20:00)  HR: 74 (2021 07:47) (56 - 138)  BP: --  BP(mean): --  ABP: 86/72 (2021 06:30) (67/61 - 133/66)  ABP(mean): 77 (2021 06:30) (64 - 101)  RR: 42 (2021 07:47) (20 - 84)  SpO2: 100% (2021 07:47) (92% - 100%)      Physical Exam:  Gen:  intubated   CNS: sedated 	  Neck: no JVD, +TC   RES : clear , no wheezing              CVS: +LVAD hum   Abd: Soft, +LLQ tender to palpation   Skin: No rash.  Ext:  no edema    ============================I/O===========================   I&O's Detail    2021 07:01  -  2021 07:00  --------------------------------------------------------  IN:    Dexmedetomidine: 176.4 mL    DOBUTamine: 5.9 mL    Enteral Tube Flush: 30 mL    Insulin: 15 mL    IV PiggyBack: 300 mL    IV PiggyBack: 412.5 mL    Miscellaneous Tube Feedin mL    Pantoprazole: 240 mL    sodium chloride 0.9%: 120 mL    Vasopressin: 1 mL  Total IN: 1730.8 mL    OUT:    Chest Tube (mL): 168 mL    Indwelling Catheter - Urethral (mL): 2770 mL  Total OUT: 2938 mL    Total NET: -1207.2 mL        ============================ LABS =========================                        7.6    11.13 )-----------( 104      ( 2021 01:18 )             24.1     07-30    135  |  101  |  13  ----------------------------<  141<H>  4.3   |  25  |  0.57    Ca    9.1      2021 00:58  Phos  1.5     07-30  Mg     1.9     07-30    TPro  6.4  /  Alb  3.0<L>  /  TBili  1.3<H>  /  DBili  x   /  AST  117<H>  /  ALT  210<H>  /  AlkPhos  224<H>  07-30    LIVER FUNCTIONS - ( 2021 00:58 )  Alb: 3.0 g/dL / Pro: 6.4 g/dL / ALK PHOS: 224 U/L / ALT: 210 U/L / AST: 117 U/L / GGT: x           PT/INR - ( 2021 00:28 )   PT: 14.4 sec;   INR: 1.21 ratio           ABG - ( 2021 00:54 )  pH, Arterial: 7.49  pH, Blood: x     /  pCO2: 38    /  pO2: 168   / HCO3: 28    / Base Excess: 4.9   /  SaO2: 100                 ======================Micro/Rad/Cardio=================  Culture: Reviewed   CXR: Reviewed  Echo:Reviewed  ======================================================  PAST MEDICAL & SURGICAL HISTORY:  CHF (congestive heart failure)    CAD (coronary artery disease)    Depression    Pleural effusion    History of 2019 novel coronavirus disease (COVID-19)  2020    Hemorrhoids    Bleeding hemorrhoids    Peripheral arterial disease    Claudication    BPH with urinary obstruction    ACC/AHA stage D systolic heart failure    Anticoagulation goal of INR 2.0 to 2.5    Falls    Clavicle fracture    CKD (chronic kidney disease), stage III    Iron deficiency anemia    H/O epistaxis    Vertigo    GI bleed    S/P TVR (tricuspid valve repair)    S/P ventricular assist device    S/P endoscopy      ====================ASSESSMENT ==============  Stage D Nonischemic Cardiomyopathy, Status Post HM2 on 2017    Cardiogenic shock  Hemodynamic instability   Acute hypoxemic respiratory failure  GI bleed , Status Post Enteroscopy   Anemia, in setting of melena   Chronic Kidney Disease  Stress hyperglycemia   C.diff positive on    Hypovolemic shock    Plan:  ====================== NEUROLOGY=====================  CTH on  with no acute intracranial hemorrhage or acute territorial infarction   Sedated with IV Precedex to maintain vent synchrony   Tylenol PRN for analgesia   C/w clonazepam for agitation    acetaminophen    Suspension .. 650 milliGRAM(s) Oral every 6 hours PRN Mild Pain (1 - 3)  clonazePAM  Tablet 0.5 milliGRAM(s) Oral at bedtime  dexMEDEtomidine Infusion 0.5 MICROgram(s)/kG/Hr (9.81 mL/Hr) IV Continuous <Continuous>    ==================== RESPIRATORY======================  Acute hypoxemic respiratory failure s/p #8 shiley trach on ; CPAP/TC vent weaning as tolerated   Requiring full vent support, continue close monitoring of respiratory rate and breathing pattern, following of ABG’s with A-line monitoring, continuous pulse oximetry monitoring.   R PTC placed on  for mod R PTX, continue to monitor output.   CT chest : Sub q emphysema in R chest wall, GGO RUL      Mechanical Ventilation:  Mode: AC/ CMV (Assist Control/ Continuous Mandatory Ventilation)  RR (machine): 20  TV (machine): 500  FiO2: 40  PEEP: 5  ITime: 1  MAP: 10  PC: 20  PIP: 25      ====================CARDIOVASCULAR==================  Stage D Nonischemic Cardiomyopathy, Status Post HM2 on 2017; LVAD settings 9200 with flow of 5.0  TTE : reveals at least moderate MR, mild AI, severe global LV syst dysfxn, dec RV fxn.  Continue invasive hemodynamic monitoring   Rate control with amiodarone     aMIOdarone    Tablet 200 milliGRAM(s) Oral daily    ===================HEMATOLOGIC/ONC ===================  Anemia due to acute blood loss associated with upper GI bleed   Monitor H&H/Plts     ===================== RENAL =========================  Continue monitoring urine output, I&OS, BUN/Cr   Replete lytes PRN. Keep K> 4 and Mg >2   Continue diuresis prn for goal CVP 8-10.     ==================== GASTROINTESTINAL===================  GI bleed in setting of recent melena, GI following - continue Protonix drip   CT A/P on : small ascites; US yesterday showing GB wall thickening w/ surrounding pericholecystic fluid, for percutaneous cholecystostomy with IR today   NPO for perc dawn   C.diff + on , continue vanco   Simethicone PRN dyspepsia     pantoprazole Infusion 8 mG/Hr (10 mL/Hr) IV Continuous <Continuous>  simethicone 80 milliGRAM(s) Chew every 8 hours PRN Dyspepsia  sodium chloride 0.9%. 1000 milliLiter(s) (5 mL/Hr) IV Continuous <Continuous>  octreotide  Injectable 50 MICROGram(s) IV Push every 8 hours    =======================    ENDOCRINE  =====================  Stress hyperglycemia, continue glucose control with IV insulin drip     insulin regular Infusion 3 Unit(s)/Hr (3 mL/Hr) IV Continuous <Continuous>    ========================INFECTIOUS DISEASE================  Temp 99.3F, WBC downtrending 12.47->11.13   Monitor temperature and trend WBC   BCx+  +Serratia marcescens c/w cefepime   C.diff + on , continue vanco     cefepime   IVPB 1000 milliGRAM(s) IV Intermittent every 8 hours  metroNIDAZOLE  IVPB 500 milliGRAM(s) IV Intermittent every 8 hours  vancomycin    Solution 125 milliGRAM(s) Oral every 12 hours      Patient requires continuous monitoring with bedside rhythm monitoring, pulse ox monitoring, and intermittent blood gas analysis. Care plan discussed with ICU care team. Patient remained critical and at risk for life threatening decompensation.     By signing my name below, I, Agnieszka Cherry, attest that this documentation has been prepared under the direction and in the presence of CLAUDIA Johnson   Electronically signed: Cesia Shaffer, 21 @ 08:10    I, Carloz Strickland, personally performed the services described in this documentation. all medical record entries made by the luisibmel were at my direction and in my presence. I have reviewed the chart and agree that the record reflects my personal performance and is accurate and complete  Electronically signed: CLAUDIA Johnson        RICKY HAMPTON  MRN-[id number]  Patient is a 66y old  Male who presents with a chief complaint of Anemia, Supratherapeutic INR, Dark Stools (2021 18:37)    HPI:  65M PMH ACC/AHA stage D HF due to NICM HM2 LVAD , TV annuloplasty ring 17 as destination therapy due to severe peripheral artery disease with significant stenosis  SIADH, Depression, CKD-3 with hyperkalemia, past E. coli UTIs, driveline drainage (21) and COVID-19 (back in 2020)  He was recently seen in clinic where he complained of abdominal pain and dark stools w constipation back in May. He presents to [facility] ER today weakness and fatigue, moderate and + Black stools for three days, on coumadin secondary to warfarin use in the setting of an LVAD. Patient has required transfusions for GIB in the past. Mostly recently back in 2021 pt had anemia with dark stools. No interventions was done at that time. However Last Endoscopy was done in 2020 (negative). Today labs show patient is anemic with H/H of 4.5/16.3,. INR is 8.84 MAP in the 90s, Temp 35.1. He denies any chest pain, shortness of breath, dizziness, abd pain, nausea or vomiting.       (2021 16:57)      Surgery/Hospital Course:   admit for melena w/ anemia, INR 8.84   6/15 Capsul study (+) for small bowel bleed, balloon endoscopy (old blood in prox ileum); post EGD - septic w/ L opacity, re-intubated for concern for aspiration, TTE (Mod MR, decrease biV w/ interventricular septum boweing towards R)   bronch    +C Diff    TC    CT C/A/P: Fluid filled colon which may be 2/2 rapid transit. Small bilateral pleffs with associates. Compressive atelectasis New ISABELLE & LLL  parenchymal opacities, suspicious for pneumonia. 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    #8 Shiley trach at bedside    CPAP trials    LVAD speed increased to 9200   Bronch; Central line dc'ed   TC since , continue as tolerated. Patient transferred to SDU.    Cont PT, no trach downsize today(#8cuffed)/ Anticoagulation/ Continue TF, speech f/u/ Vanco taper    oob to chair  INR  2.47  5 mg coumadin     increased lop to 25 q8  per HF   INR today 2.64.  H&H 7.3/24 this AM.  Will repeat CBC at noon, and will send stool guaiac Patient with persistent abdominal tenderness, rate of tube feeds decreased.  No nausea/vomiting.     INR today 2.4H&H 9.1/.6 low flow overnight /N&V  NPO resolved for capsule study  Coumadin on hold refusing Tube feeds on D5 / normal  @50 cc/hr   INR 2.69  H&H 7..1 refusing Tube feeds on D5 1/2 normal  @50 cc/hr. This am + BM Anusha Oneill HF  aware- PRBC x1  GI team consulted -  NPO  plan on study in am-  D/w Dr Cadet Patient  to return to CTU for further management; 1PRBCS    Post op INR 2.2 today.  No bleeding. BC + for SM.  Pt is hypotensive requiring pressor and inotropic support.  ID follow up today on Cefepime will follow.   R PTC for PTX    CT C/A/P: sub q emphysema in R chest wall, GGO RUL, small ascites CTH negative; Abd US: GB thickening, pericholecystic fluid      Today:  S/p cholecystostomy tube this AM with IR with -60cc of black bile, will monitor site closely. As per GI, not stable for endoscopic evaluation, doubt that blood loss is cause of hypotension, more likely underlying sepsis; would transfuse prn and f/u repeat CBC. May consider can consider enteroscopy when stabilized.     REVIEW OF SYSTEMS:  Unable to obtain secondary to pt being trached     ICU Vital Signs Last 24 Hrs  T(C): 37.4 (2021 07:47), Max: 37.8 (2021 20:00)  T(F): 99.3 (2021 04:00), Max: 100 (2021 20:00)  HR: 74 (2021 07:47) (56 - 138)  BP: --  BP(mean): --  ABP: 86/72 (2021 06:30) (67/61 - 133/66)  ABP(mean): 77 (2021 06:30) (64 - 101)  RR: 42 (2021 07:47) (20 - 84)  SpO2: 100% (2021 07:47) (92% - 100%)      Physical Exam:  Gen:  intubated   CNS: sedated 	  Neck: no JVD, +TC   RES : clear , no wheezing              CVS: +LVAD hum   Abd: Soft, +LLQ tender to palpation   Skin: No rash.  Ext:  no edema    ============================I/O===========================   I&O's Detail    2021 07:01  -  2021 07:00  --------------------------------------------------------  IN:    Dexmedetomidine: 176.4 mL    DOBUTamine: 5.9 mL    Enteral Tube Flush: 30 mL    Insulin: 15 mL    IV PiggyBack: 300 mL    IV PiggyBack: 412.5 mL    Miscellaneous Tube Feedin mL    Pantoprazole: 240 mL    sodium chloride 0.9%: 120 mL    Vasopressin: 1 mL  Total IN: 1730.8 mL    OUT:    Chest Tube (mL): 168 mL    Indwelling Catheter - Urethral (mL): 2770 mL  Total OUT: 2938 mL    Total NET: -1207.2 mL        ============================ LABS =========================                        7.6    11.13 )-----------( 104      ( 2021 01:18 )             24.1     07-30    135  |  101  |  13  ----------------------------<  141<H>  4.3   |  25  |  0.57    Ca    9.1      2021 00:58  Phos  1.5       Mg     1.9         TPro  6.4  /  Alb  3.0<L>  /  TBili  1.3<H>  /  DBili  x   /  AST  117<H>  /  ALT  210<H>  /  AlkPhos  224<H>      LIVER FUNCTIONS - ( 2021 00:58 )  Alb: 3.0 g/dL / Pro: 6.4 g/dL / ALK PHOS: 224 U/L / ALT: 210 U/L / AST: 117 U/L / GGT: x           PT/INR - ( 2021 00:28 )   PT: 14.4 sec;   INR: 1.21 ratio           ABG - ( 2021 00:54 )  pH, Arterial: 7.49  pH, Blood: x     /  pCO2: 38    /  pO2: 168   / HCO3: 28    / Base Excess: 4.9   /  SaO2: 100                 ======================Micro/Rad/Cardio=================  Culture: Reviewed   CXR: Reviewed  Echo:Reviewed  ======================================================  PAST MEDICAL & SURGICAL HISTORY:  CHF (congestive heart failure)    CAD (coronary artery disease)    Depression    Pleural effusion    History of 2019 novel coronavirus disease (COVID-19)  2020    Hemorrhoids    Bleeding hemorrhoids    Peripheral arterial disease    Claudication    BPH with urinary obstruction    ACC/AHA stage D systolic heart failure    Anticoagulation goal of INR 2.0 to 2.5    Falls    Clavicle fracture    CKD (chronic kidney disease), stage III    Iron deficiency anemia    H/O epistaxis    Vertigo    GI bleed    S/P TVR (tricuspid valve repair)    S/P ventricular assist device    S/P endoscopy      ====================ASSESSMENT ==============  Stage D Nonischemic Cardiomyopathy, Status Post HM2 on 2017    Cardiogenic shock  Hemodynamic instability   Acute hypoxemic respiratory failure  GI bleed , Status Post Enteroscopy   Anemia, in setting of melena   Chronic Kidney Disease  Stress hyperglycemia   C.diff positive on    Hypovolemic shock    Plan:  ====================== NEUROLOGY=====================  CTH on  with no acute intracranial hemorrhage or acute territorial infarction   Sedated with IV Precedex to maintain vent synchrony   Tylenol PRN for analgesia   C/w clonazepam for agitation    acetaminophen    Suspension .. 650 milliGRAM(s) Oral every 6 hours PRN Mild Pain (1 - 3)  clonazePAM  Tablet 0.5 milliGRAM(s) Oral at bedtime  dexMEDEtomidine Infusion 0.5 MICROgram(s)/kG/Hr (9.81 mL/Hr) IV Continuous <Continuous>    ==================== RESPIRATORY======================  Acute hypoxemic respiratory failure s/p #8 shiley trach on ; CPAP/TC vent weaning as tolerated   Requiring full vent support, continue close monitoring of respiratory rate and breathing pattern, following of ABG’s with A-line monitoring, continuous pulse oximetry monitoring.   R PTC placed on  for mod R PTX, continue to monitor output.   CT chest : Sub q emphysema in R chest wall, GGO RUL      Mechanical Ventilation:  Mode: AC/ CMV (Assist Control/ Continuous Mandatory Ventilation)  RR (machine): 20  TV (machine): 500  FiO2: 40  PEEP: 5  ITime: 1  MAP: 10  PC: 20  PIP: 25      ====================CARDIOVASCULAR==================  Stage D Nonischemic Cardiomyopathy, Status Post HM2 on 2017; LVAD settings 9200 with flow of 5.0  TTE : reveals at least moderate MR, mild AI, severe global LV syst dysfxn, dec RV fxn.  Continue invasive hemodynamic monitoring   Rate control with amiodarone     aMIOdarone    Tablet 200 milliGRAM(s) Oral daily    ===================HEMATOLOGIC/ONC ===================  Anemia due to acute blood loss associated with upper GI bleed   Monitor H&H/Plts     ===================== RENAL =========================  Continue monitoring urine output, I&OS, BUN/Cr   Replete lytes PRN. Keep K> 4 and Mg >2   Continue diuresis prn for goal CVP 8-10.     ==================== GASTROINTESTINAL===================  GI bleed in setting of recent melena, GI following - continue Protonix drip   As per GI, not stable for endoscopic evaluation, doubt that blood loss is cause of hypotension, more likely underlying sepsis; transfuse prn and f/u repeat CBC.   May consider can consider enteroscopy when stabilized.   CT A/P on : small ascites; US yesterday showing GB wall thickening w/ surrounding pericholecystic fluid  S/p cholecystostomy tube this AM with IR with -60cc of black bile, will monitor site closely.   C.diff + on , continue vanco   Simethicone PRN dyspepsia     pantoprazole Infusion 8 mG/Hr (10 mL/Hr) IV Continuous <Continuous>  simethicone 80 milliGRAM(s) Chew every 8 hours PRN Dyspepsia  sodium chloride 0.9%. 1000 milliLiter(s) (5 mL/Hr) IV Continuous <Continuous>  octreotide  Injectable 50 MICROGram(s) IV Push every 8 hours    =======================    ENDOCRINE  =====================  Stress hyperglycemia, continue glucose control with IV insulin drip     insulin regular Infusion 3 Unit(s)/Hr (3 mL/Hr) IV Continuous <Continuous>    ========================INFECTIOUS DISEASE================  Temp 99.3F, WBC downtrending 12.47->11.13   Monitor temperature and trend WBC   BCx+  +Serratia marcescens c/w cefepime   C.diff + on , continue vanco     cefepime   IVPB 1000 milliGRAM(s) IV Intermittent every 8 hours  metroNIDAZOLE  IVPB 500 milliGRAM(s) IV Intermittent every 8 hours  vancomycin    Solution 125 milliGRAM(s) Oral every 12 hours      Patient requires continuous monitoring with bedside rhythm monitoring, pulse ox monitoring, and intermittent blood gas analysis. Care plan discussed with ICU care team. Patient remained critical and at risk for life threatening decompensation.     By signing my name below, I, Agnieszka Cherry, attest that this documentation has been prepared under the direction and in the presence of CLAUDIA Johnson   Electronically signed: Cesia Shaffer, 21 @ 08:10    I, Carloz Strickland, personally performed the services described in this documentation. all medical record entries made by the luisibe were at my direction and in my presence. I have reviewed the chart and agree that the record reflects my personal performance and is accurate and complete  Electronically signed: CLAUDIA Johnson        RICKY HAMPTON  MRN-[id number]  Patient is a 66y old  Male who presents with a chief complaint of Anemia, Supratherapeutic INR, Dark Stools (2021 18:37)    HPI:  65M PMH ACC/AHA stage D HF due to NICM HM2 LVAD , TV annuloplasty ring 17 as destination therapy due to severe peripheral artery disease with significant stenosis  SIADH, Depression, CKD-3 with hyperkalemia, past E. coli UTIs, driveline drainage (21) and COVID-19 (back in 2020)  He was recently seen in clinic where he complained of abdominal pain and dark stools w constipation back in May. He presents to [facility] ER today weakness and fatigue, moderate and + Black stools for three days, on coumadin secondary to warfarin use in the setting of an LVAD. Patient has required transfusions for GIB in the past. Mostly recently back in 2021 pt had anemia with dark stools. No interventions was done at that time. However Last Endoscopy was done in 2020 (negative). Today labs show patient is anemic with H/H of 4.5/16.3,. INR is 8.84 MAP in the 90s, Temp 35.1. He denies any chest pain, shortness of breath, dizziness, abd pain, nausea or vomiting.       (2021 16:57)      Surgery/Hospital Course:   admit for melena w/ anemia, INR 8.84   6/15 Capsul study (+) for small bowel bleed, balloon endoscopy (old blood in prox ileum); post EGD - septic w/ L opacity, re-intubated for concern for aspiration, TTE (Mod MR, decrease biV w/ interventricular septum boweing towards R)   bronch    +C Diff    TC    CT C/A/P: Fluid filled colon which may be 2/2 rapid transit. Small bilateral pleffs with associates. Compressive atelectasis New ISABELLE & LLL  parenchymal opacities, suspicious for pneumonia. 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    #8 Shiley trach at bedside    CPAP trials    LVAD speed increased to 9200   Bronch; Central line dc'ed   TC since , continue as tolerated. Patient transferred to SDU.    Cont PT, no trach downsize today(#8cuffed)/ Anticoagulation/ Continue TF, speech f/u/ Vanco taper    oob to chair  INR  2.47  5 mg coumadin     increased lop to 25 q8  per HF   INR today 2.64.  H&H 7.3 this AM.  Will repeat CBC at noon, and will send stool guaiac Patient with persistent abdominal tenderness, rate of tube feeds decreased.  No nausea/vomiting.     INR today 2.4H&H 9.1/.6 low flow overnight /N&V  NPO resolved for capsule study  Coumadin on hold refusing Tube feeds on D5 / normal  @50 cc/hr   INR 2.69  H&H 7..1 refusing Tube feeds on D5 2 normal  @50 cc/hr. This am + BM Anusha Oneill HF  aware- PRBC x1  GI team consulted -  NPO  plan on study in am-  D/w Dr Cadet Patient  to return to CTU for further management; 1PRBCS    Post op INR 2.2 today.  No bleeding. BC + for SM.  Pt is hypotensive requiring pressor and inotropic support.  ID follow up today on Cefepime will follow.   R PTC for PTX    CT C/A/P: sub q emphysema in R chest wall, GGO RUL, small ascites CTH negative; Abd US: GB thickening, pericholecystic fluid      Today:  S/p cholecystostomy tube this AM with IR with -60cc of black bile, will monitor site closely. As per GI, not stable for endoscopic evaluation, transfuse prn and f/u repeat CBC. May consider enteroscopy when stabilized.     REVIEW OF SYSTEMS:  Unable to obtain secondary to pt being trached     ICU Vital Signs Last 24 Hrs  T(C): 37.4 (2021 07:47), Max: 37.8 (2021 20:00)  T(F): 99.3 (2021 04:00), Max: 100 (2021 20:00)  HR: 74 (2021 07:47) (56 - 138)  BP: --  BP(mean): --  ABP: 86/72 (2021 06:30) (67/61 - 133/66)  ABP(mean): 77 (2021 06:30) (64 - 101)  RR: 42 (2021 07:47) (20 - 84)  SpO2: 100% (2021 07:47) (92% - 100%)      Physical Exam:  Gen:  intubated   CNS: sedated 	  Neck: no JVD, +TC   RES : clear , no wheezing              CVS: +LVAD hum   Abd: Soft, +LLQ tender to palpation   Skin: No rash.  Ext:  no edema    ============================I/O===========================   I&O's Detail    2021 07:01  -  2021 07:00  --------------------------------------------------------  IN:    Dexmedetomidine: 176.4 mL    DOBUTamine: 5.9 mL    Enteral Tube Flush: 30 mL    Insulin: 15 mL    IV PiggyBack: 300 mL    IV PiggyBack: 412.5 mL    Miscellaneous Tube Feedin mL    Pantoprazole: 240 mL    sodium chloride 0.9%: 120 mL    Vasopressin: 1 mL  Total IN: 1730.8 mL    OUT:    Chest Tube (mL): 168 mL    Indwelling Catheter - Urethral (mL): 2770 mL  Total OUT: 2938 mL    Total NET: -1207.2 mL        ============================ LABS =========================                        7.6    11.13 )-----------( 104      ( 2021 01:18 )             24.1     07-30    135  |  101  |  13  ----------------------------<  141<H>  4.3   |  25  |  0.57    Ca    9.1      2021 00:58  Phos  1.5     -  Mg     1.9     -30    TPro  6.4  /  Alb  3.0<L>  /  TBili  1.3<H>  /  DBili  x   /  AST  117<H>  /  ALT  210<H>  /  AlkPhos  224<H>      LIVER FUNCTIONS - ( 2021 00:58 )  Alb: 3.0 g/dL / Pro: 6.4 g/dL / ALK PHOS: 224 U/L / ALT: 210 U/L / AST: 117 U/L / GGT: x           PT/INR - ( 2021 00:28 )   PT: 14.4 sec;   INR: 1.21 ratio           ABG - ( 2021 00:54 )  pH, Arterial: 7.49  pH, Blood: x     /  pCO2: 38    /  pO2: 168   / HCO3: 28    / Base Excess: 4.9   /  SaO2: 100                 ======================Micro/Rad/Cardio=================  Culture: Reviewed   CXR: Reviewed  Echo:Reviewed  ======================================================  PAST MEDICAL & SURGICAL HISTORY:  CHF (congestive heart failure)    CAD (coronary artery disease)    Depression    Pleural effusion    History of 2019 novel coronavirus disease (COVID-19)  2020    Hemorrhoids    Bleeding hemorrhoids    Peripheral arterial disease    Claudication    BPH with urinary obstruction    ACC/AHA stage D systolic heart failure    Anticoagulation goal of INR 2.0 to 2.5    Falls    Clavicle fracture    CKD (chronic kidney disease), stage III    Iron deficiency anemia    H/O epistaxis    Vertigo    GI bleed    S/P TVR (tricuspid valve repair)    S/P ventricular assist device    S/P endoscopy      ====================ASSESSMENT ==============  Stage D Nonischemic Cardiomyopathy, Status Post HM2 on 2017    Cardiogenic shock  Hemodynamic instability   Acute hypoxemic respiratory failure  GI bleed , Status Post Enteroscopy 6/14  Anemia, in setting of melena   Chronic Kidney Disease  Stress hyperglycemia   C.diff positive on    Hypovolemic shock    Plan:  ====================== NEUROLOGY=====================  CTH on  with no acute intracranial hemorrhage or acute territorial infarction   Sedated with IV Precedex to maintain vent synchrony   Tylenol PRN for analgesia   C/w clonazepam for agitation    acetaminophen    Suspension .. 650 milliGRAM(s) Oral every 6 hours PRN Mild Pain (1 - 3)  clonazePAM  Tablet 0.5 milliGRAM(s) Oral at bedtime  dexMEDEtomidine Infusion 0.5 MICROgram(s)/kG/Hr (9.81 mL/Hr) IV Continuous <Continuous>    ==================== RESPIRATORY======================  Acute hypoxemic respiratory failure s/p #8 shiley trach on ; CPAP/TC vent weaning as tolerated   Requiring full vent support, continue close monitoring of respiratory rate and breathing pattern, following of ABG’s with A-line monitoring, continuous pulse oximetry monitoring.   R PTC placed on  for mod R PTX, continue to monitor output.   CT chest : Sub q emphysema in R chest wall, GGO RUL      Mechanical Ventilation:  Mode: AC/ CMV (Assist Control/ Continuous Mandatory Ventilation)  RR (machine): 20  TV (machine): 500  FiO2: 40  PEEP: 5  ITime: 1  MAP: 10  PC: 20  PIP: 25      ====================CARDIOVASCULAR==================  Stage D Nonischemic Cardiomyopathy, Status Post HM2 on 2017; LVAD settings 9200 with flow of 5.0  TTE : reveals at least moderate MR, mild AI, severe global LV syst dysfxn, dec RV fxn.  Continue invasive hemodynamic monitoring   Rate control with amiodarone     aMIOdarone    Tablet 200 milliGRAM(s) Oral daily    ===================HEMATOLOGIC/ONC ===================  Anemia due to acute blood loss associated with upper GI bleed   Monitor H&H/Plts     ===================== RENAL =========================  Continue monitoring urine output, I&OS, BUN/Cr   Replete lytes PRN. Keep K> 4 and Mg >2   Continue diuresis prn for goal CVP 8-10.     ==================== GASTROINTESTINAL===================  GI bleed in setting of recent melena, GI following - continue Protonix drip   As per GI, not stable for endoscopic evaluation, transfuse prn and f/u repeat CBC. May consider enteroscopy when stabilized.   CT A/P on : small ascites; US yesterday showing GB wall thickening w/ surrounding pericholecystic fluid  S/p cholecystostomy tube this AM with IR with -60cc of black bile, will monitor site closely.   C.diff + on , continue vanco   Simethicone PRN dyspepsia     pantoprazole Infusion 8 mG/Hr (10 mL/Hr) IV Continuous <Continuous>  simethicone 80 milliGRAM(s) Chew every 8 hours PRN Dyspepsia  sodium chloride 0.9%. 1000 milliLiter(s) (5 mL/Hr) IV Continuous <Continuous>  octreotide  Injectable 50 MICROGram(s) IV Push every 8 hours    =======================    ENDOCRINE  =====================  Stress hyperglycemia, continue glucose control with IV insulin drip     insulin regular Infusion 3 Unit(s)/Hr (3 mL/Hr) IV Continuous <Continuous>    ========================INFECTIOUS DISEASE================  Temp 99.3F, WBC downtrending 12.47->11.13   Monitor temperature and trend WBC   BCx+  +Serratia marcescens c/w cefepime   C.diff + on , continue vanco     cefepime   IVPB 1000 milliGRAM(s) IV Intermittent every 8 hours  metroNIDAZOLE  IVPB 500 milliGRAM(s) IV Intermittent every 8 hours  vancomycin    Solution 125 milliGRAM(s) Oral every 12 hours      Patient requires continuous monitoring with bedside rhythm monitoring, pulse ox monitoring, and intermittent blood gas analysis. Care plan discussed with ICU care team. Patient remained critical and at risk for life threatening decompensation.     By signing my name below, I, Agnieszka Cherry, attest that this documentation has been prepared under the direction and in the presence of CLAUDIA Johnson   Electronically signed: Cesia Shaffer, 21 @ 08:10    I, Carloz Strickland, personally performed the services described in this documentation. all medical record entries made by the luisibe were at my direction and in my presence. I have reviewed the chart and agree that the record reflects my personal performance and is accurate and complete  Electronically signed: CLAUDIA Johnson        RICKY HAMPTON  MRN-[id number]  Patient is a 66y old  Male who presents with a chief complaint of Anemia, Supratherapeutic INR, Dark Stools (2021 18:37)    HPI:  65M PMH ACC/AHA stage D HF due to NICM HM2 LVAD , TV annuloplasty ring 17 as destination therapy due to severe peripheral artery disease with significant stenosis  SIADH, Depression, CKD-3 with hyperkalemia, past E. coli UTIs, driveline drainage (21) and COVID-19 (back in 2020)  He was recently seen in clinic where he complained of abdominal pain and dark stools w constipation back in May. He presents to [facility] ER today weakness and fatigue, moderate and + Black stools for three days, on coumadin secondary to warfarin use in the setting of an LVAD. Patient has required transfusions for GIB in the past. Mostly recently back in 2021 pt had anemia with dark stools. No interventions was done at that time. However Last Endoscopy was done in 2020 (negative). Today labs show patient is anemic with H/H of 4.5/16.3,. INR is 8.84 MAP in the 90s, Temp 35.1. He denies any chest pain, shortness of breath, dizziness, abd pain, nausea or vomiting.       (2021 16:57)      Surgery/Hospital Course:   admit for melena w/ anemia, INR 8.84   6/15 Capsul study (+) for small bowel bleed, balloon endoscopy (old blood in prox ileum); post EGD - septic w/ L opacity, re-intubated for concern for aspiration, TTE (Mod MR, decrease biV w/ interventricular septum boweing towards R)   bronch    +C Diff    TC    CT C/A/P: Fluid filled colon which may be 2/2 rapid transit. Small bilateral pleffs with associates. Compressive atelectasis New ISABELLE & LLL  parenchymal opacities, suspicious for pneumonia. 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    #8 Shiley trach at bedside    CPAP trials    LVAD speed increased to 9200   Bronch; Central line dc'ed   TC since , continue as tolerated. Patient transferred to SDU.    Cont PT, no trach downsize today(#8cuffed)/ Anticoagulation/ Continue TF, speech f/u/ Vanco taper    oob to chair  INR  2.47  5 mg coumadin     increased lop to 25 q8  per HF   INR today 2.64.  H&H 7.3 this AM.  Will repeat CBC at noon, and will send stool guaiac Patient with persistent abdominal tenderness, rate of tube feeds decreased.  No nausea/vomiting.     INR today 2.4H&H 9.1/.6 low flow overnight /N&V  NPO resolved for capsule study  Coumadin on hold refusing Tube feeds on D5 / normal  @50 cc/hr   INR 2.69  H&H 7..1 refusing Tube feeds on D5 2 normal  @50 cc/hr. This am + BM nAusha Oneill HF  aware- PRBC x1  GI team consulted -  NPO  plan on study in am-  D/w Dr Cadet Patient  to return to CTU for further management; 1PRBCS    Post op INR 2.2 today.  No bleeding. BC + for SM.  Pt is hypotensive requiring pressor and inotropic support.  ID follow up today on Cefepime will follow.   R PTC for PTX    CT C/A/P: sub q emphysema in R chest wall, GGO RUL, small ascites CTH negative; Abd US: GB thickening, pericholecystic fluid      Today:  S/p cholecystostomy tube this AM with IR with -60cc of black bile, will monitor site closely. As per GI, not stable for endoscopic evaluation, transfuse prn and f/u repeat CBC. May consider enteroscopy when stabilized.     REVIEW OF SYSTEMS:  Unable to obtain secondary to pt being trached     ICU Vital Signs Last 24 Hrs  T(C): 37.4 (2021 07:47), Max: 37.8 (2021 20:00)  T(F): 99.3 (2021 04:00), Max: 100 (2021 20:00)  HR: 74 (2021 07:47) (56 - 138)  BP: --  BP(mean): --  ABP: 86/72 (2021 06:30) (67/61 - 133/66)  ABP(mean): 77 (2021 06:30) (64 - 101)  RR: 42 (2021 07:47) (20 - 84)  SpO2: 100% (2021 07:47) (92% - 100%)      Physical Exam:  Gen:  intubated   CNS: sedated 	  Neck: no JVD, +TC   RES : clear , no wheezing              CVS: +LVAD hum   Abd: Soft, +LLQ tender to palpation   Skin: No rash.  Ext:  no edema    ============================I/O===========================   I&O's Detail    2021 07:01  -  2021 07:00  --------------------------------------------------------  IN:    Dexmedetomidine: 176.4 mL    DOBUTamine: 5.9 mL    Enteral Tube Flush: 30 mL    Insulin: 15 mL    IV PiggyBack: 300 mL    IV PiggyBack: 412.5 mL    Miscellaneous Tube Feedin mL    Pantoprazole: 240 mL    sodium chloride 0.9%: 120 mL    Vasopressin: 1 mL  Total IN: 1730.8 mL    OUT:    Chest Tube (mL): 168 mL    Indwelling Catheter - Urethral (mL): 2770 mL  Total OUT: 2938 mL    Total NET: -1207.2 mL        ============================ LABS =========================                        7.6    11.13 )-----------( 104      ( 2021 01:18 )             24.1     07-30    135  |  101  |  13  ----------------------------<  141<H>  4.3   |  25  |  0.57    Ca    9.1      2021 00:58  Phos  1.5     -  Mg     1.9     -30    TPro  6.4  /  Alb  3.0<L>  /  TBili  1.3<H>  /  DBili  x   /  AST  117<H>  /  ALT  210<H>  /  AlkPhos  224<H>      LIVER FUNCTIONS - ( 2021 00:58 )  Alb: 3.0 g/dL / Pro: 6.4 g/dL / ALK PHOS: 224 U/L / ALT: 210 U/L / AST: 117 U/L / GGT: x           PT/INR - ( 2021 00:28 )   PT: 14.4 sec;   INR: 1.21 ratio           ABG - ( 2021 00:54 )  pH, Arterial: 7.49  pH, Blood: x     /  pCO2: 38    /  pO2: 168   / HCO3: 28    / Base Excess: 4.9   /  SaO2: 100                 ======================Micro/Rad/Cardio=================  Culture: Reviewed   CXR: Reviewed  Echo:Reviewed  ======================================================  PAST MEDICAL & SURGICAL HISTORY:  CHF (congestive heart failure)    CAD (coronary artery disease)    Depression    Pleural effusion    History of 2019 novel coronavirus disease (COVID-19)  2020    Hemorrhoids    Bleeding hemorrhoids    Peripheral arterial disease    Claudication    BPH with urinary obstruction    ACC/AHA stage D systolic heart failure    Anticoagulation goal of INR 2.0 to 2.5    Falls    Clavicle fracture    CKD (chronic kidney disease), stage III    Iron deficiency anemia    H/O epistaxis    Vertigo    GI bleed    S/P TVR (tricuspid valve repair)    S/P ventricular assist device    S/P endoscopy      ====================ASSESSMENT ==============  Stage D Nonischemic Cardiomyopathy, Status Post HM2 on 2017    Cardiogenic shock  Hemodynamic instability   Acute hypoxemic respiratory failure  GI bleed , Status Post Enteroscopy 6/14  Anemia, in setting of melena   Chronic Kidney Disease  Stress hyperglycemia   C.diff positive on    Hypovolemic shock    Plan:  ====================== NEUROLOGY=====================  CTH on  with no acute intracranial hemorrhage or acute territorial infarction   Sedated with IV Precedex to maintain vent synchrony   Tylenol PRN for analgesia   C/w clonazepam for agitation    acetaminophen    Suspension .. 650 milliGRAM(s) Oral every 6 hours PRN Mild Pain (1 - 3)  clonazePAM  Tablet 0.5 milliGRAM(s) Oral at bedtime  dexMEDEtomidine Infusion 0.5 MICROgram(s)/kG/Hr (9.81 mL/Hr) IV Continuous <Continuous>    ==================== RESPIRATORY======================  Acute hypoxemic respiratory failure s/p #8 shiley trach on ; CPAP/TC vent weaning as tolerated   Requiring full vent support, continue close monitoring of respiratory rate and breathing pattern, following of ABG’s with A-line monitoring, continuous pulse oximetry monitoring.   R PTC placed on  for mod R PTX, continue to monitor output.   CT chest : Sub q emphysema in R chest wall, GGO RUL      Mechanical Ventilation:  Mode: AC/ CMV (Assist Control/ Continuous Mandatory Ventilation)  RR (machine): 20  TV (machine): 500  FiO2: 40  PEEP: 5  ITime: 1  MAP: 10  PC: 20  PIP: 25      ====================CARDIOVASCULAR==================  Stage D Nonischemic Cardiomyopathy, Status Post HM2 on 2017; LVAD settings 9200 with flow of 5.0  TTE : reveals at least moderate MR, mild AI, severe global LV syst dysfxn, dec RV fxn.  Continue invasive hemodynamic monitoring   Rate control with amiodarone     aMIOdarone    Tablet 200 milliGRAM(s) Oral daily    ===================HEMATOLOGIC/ONC ===================  Anemia due to acute blood loss associated with upper GI bleed   Monitor H&H/Plts     ===================== RENAL =========================  Continue monitoring urine output, I&OS, BUN/Cr   Replete lytes PRN. Keep K> 4 and Mg >2   Continue diuresis prn for goal CVP 8-10.     ==================== GASTROINTESTINAL===================  GI bleed in setting of recent melena, GI following - continue Protonix drip   As per GI, not stable for endoscopic evaluation, transfuse prn and f/u repeat CBC. May consider enteroscopy when stabilized.   CT A/P on : small ascites; US yesterday showing GB wall thickening w/ surrounding pericholecystic fluid  S/p cholecystostomy tube this AM with IR with -60cc of black bile, will monitor site closely.   C.diff + on , continue vanco   Simethicone PRN dyspepsia     pantoprazole Infusion 8 mG/Hr (10 mL/Hr) IV Continuous <Continuous>  simethicone 80 milliGRAM(s) Chew every 8 hours PRN Dyspepsia  sodium chloride 0.9%. 1000 milliLiter(s) (5 mL/Hr) IV Continuous <Continuous>  octreotide  Injectable 50 MICROGram(s) IV Push every 8 hours    =======================    ENDOCRINE  =====================  Stress hyperglycemia, continue glucose control with IV insulin drip     insulin regular Infusion 3 Unit(s)/Hr (3 mL/Hr) IV Continuous <Continuous>    ========================INFECTIOUS DISEASE================  Temp 99.3F, WBC downtrending 12.47->11.13   Monitor temperature and trend WBC   BCx+  +Serratia marcescens c/w cefepime and metronidazole   C.diff + on , continue vanco     cefepime   IVPB 1000 milliGRAM(s) IV Intermittent every 8 hours  metroNIDAZOLE  IVPB 500 milliGRAM(s) IV Intermittent every 8 hours  vancomycin    Solution 125 milliGRAM(s) Oral every 12 hours      Patient requires continuous monitoring with bedside rhythm monitoring, pulse ox monitoring, and intermittent blood gas analysis. Care plan discussed with ICU care team. Patient remained critical and at risk for life threatening decompensation.     By signing my name below, I, Agnieszka Cherry, attest that this documentation has been prepared under the direction and in the presence of CLAUDIA Johnson   Electronically signed: Cesia Shaffer, 21 @ 08:10    I, Carloz Strickland, personally performed the services described in this documentation. all medical record entries made by the scribe were at my direction and in my presence. I have reviewed the chart and agree that the record reflects my personal performance and is accurate and complete  Electronically signed: CLAUDIA Johnson

## 2021-07-30 NOTE — PROGRESS NOTE ADULT - ATTENDING COMMENTS
Underwent perc dawn. Weaned off  w/ stable VAD flows. On exam, sedated, JVP approx 12 cm, LVAD hum, CTAB, nontender abdomen, no pedal edema. Labs reviewed - WBC 11 (downtrending), Hb 8 (downtrending), BUN/Cr stable,  (stable), LFTs uptrending.   - c/w cefepime/flagyl for Serratia bacteremia  - goal CVP 8-10; give lasix 20 IV today  - maintain MAP >65  - defer on endoscopy +/- VCE given sepsis   - prognosis guarded; plan for pall care meeting

## 2021-07-30 NOTE — PROGRESS NOTE ADULT - ASSESSMENT
66 years old male with a history of stage D NICM s/p HM2 on 9/2017 as DT (due to severe PAD) with TV ring, prior COVID-19 infection 4/2020, recurrent syncope post LVAD s/p ILR, admitted on 6/14/21with symptomatic anemia with Hgb 4.5 in setting of INR 8.8 without hemodynamic instability. He was transfused and underwent VCE which showed active bleeding in the mid small bowel but subsequent enteroscopy 6/15 did not reveal any active bleeding. He acutely decompensated after procedure with fever/hypertension, low flow alarms, and pulmonary infiltrate with hypoxia requiring intubation from probable aspiration PNA. Course notable for inability to wean ventilator with persistent secretions for which he underwent tracheostomy. He developed C. diff colitis and is on vancomycin taper. He is currently vent dependent now and requiring pressor support. BCx showed serratia on 7/26, which correlates to his sputum culture on 7/7 and 7/27. The source is most likely from pneumonia or cholecystitis. Transaminitis could be secondary to acalculous cholecystitis. Low suspicion for device infection (gram negative organism) due to quick blood culture clearance.      IMPRESSION:  Septic shock secondary to high grade Serratia bacteremia, secondary to pneumonia vs cholecystitis  Tension pneumothorax s/p right chest tube 7/26  Transaminitis, cholestatic pattern, possibly acalculous cholecystitis s/p perc dawn tube 7/30  C. diff colitis on PO vancomycin taper  ICM s/p LVAD  BCx serratia 7/26, NGTD 7/27, 7/28, 7/29  Sputum culture serratia 7/7, 7/27  CT consolidation on left lower lobe, improved from CT in June 2021  No cholangitis  No signs of drive line infection      RECOMMENDATIONS:  - Continue with IV cefepime 1gm q8hrs and IV Flagyl 500mg q8hrs  - Continue PO vancomycin 125mg q12hrs while on board-spectrum antibiotics  - Follow up with on bile fluid GS and culture  - Will continue to follow  - Guarded prognosis      * THIS IS AN INCOMPLETE NOTE. FINAL RECOMMENDATION WILL BE UPDATED AFTER DISCUSSING WITH ATTENDING *        Marisa Hall DO  PGY4 ID fellow  Pager: [phone]  [facility] pager ID: [phone]  Please contact me via page or text through Microsoft Teams  If after 5PM or on weekends, please call [phone]     66 years old male with a history of stage D NICM s/p HM2 on 9/2017 as DT (due to severe PAD) with TV ring, prior COVID-19 infection 4/2020, recurrent syncope post LVAD s/p ILR, admitted on 6/14/21with symptomatic anemia with Hgb 4.5 in setting of INR 8.8 without hemodynamic instability. He was transfused and underwent VCE which showed active bleeding in the mid small bowel but subsequent enteroscopy 6/15 did not reveal any active bleeding. He acutely decompensated after procedure with fever/hypertension, low flow alarms, and pulmonary infiltrate with hypoxia requiring intubation from probable aspiration PNA. Course notable for inability to wean ventilator with persistent secretions for which he underwent tracheostomy. He developed C. diff colitis and is on vancomycin taper. He is currently vent dependent now and requiring pressor support. BCx showed serratia on 7/26, which correlates to his sputum culture on 7/7 and 7/27. The source is most likely from pneumonia or cholecystitis. Transaminitis could be secondary to acalculous cholecystitis. Low suspicion for device infection (gram negative organism) due to quick blood culture clearance.      IMPRESSION:  Septic shock secondary to high grade Serratia bacteremia, secondary to pneumonia vs cholecystitis  Tension pneumothorax s/p right chest tube 7/26  Transaminitis, cholestatic pattern, possibly acalculous cholecystitis s/p perc dawn tube 7/30  C. diff colitis on PO vancomycin taper  ICM s/p LVAD  BCx serratia 7/26, NGTD 7/27, 7/28, 7/29  Sputum culture serratia 7/7, serratia and stenotrophomonas 7/27  CT consolidation on left lower lobe, improved from CT in June 2021  No cholangitis  No signs of drive line infection      RECOMMENDATIONS:  - Continue with IV cefepime 1gm q8hrs and IV Flagyl 500mg q8hrs  - Patient is improving on current abx regimen and CT chest also improved; will hold off on treating Stenotrophomonas for now unless he deteriorates on current treatment or with signs of worsening PNA  - Continue PO vancomycin 125mg q12hrs while on board-spectrum antibiotics  - Follow up with on bile fluid GS and culture  - Will continue to follow  - Guarded prognosis      Case discussed with attending and primary team      Marisa Hall DO  PGY4 ID fellow  Pager: [phone]  [facility] pager ID: [phone]  Please contact me via page or text through Microsoft Teams  If after 5PM or on weekends, please call [phone]

## 2021-07-30 NOTE — PROGRESS NOTE ADULT - SUBJECTIVE AND OBJECTIVE BOX
INFECTIOUS DISEASE FOLLOW UP NOTE:    Interval History/ROS: Patient is a 66y old  Male who presents with a chief complaint of Anemia, Supratherapeutic INR, Dark Stools (30 Jul 2021 08:09)    Overnight events: Patient was afebrile overnight, Tmax 100F. BP is stable, not on pressor. He underwent percutaneous cholecystostomy tube today with IR.       REVIEW OF SYSTEMS:      Prior hospital charts reviewed [Yes]  Primary team notes reviewed [Yes]  Other consultant notes reviewed [Yes]    Allergies:  No Known Allergies      ANTIMICROBIALS:   cefepime   IVPB 1000 every 8 hours  metroNIDAZOLE  IVPB 500 every 8 hours  vancomycin    Solution 125 every 12 hours      OTHER MEDS: MEDICATIONS  (STANDING):  acetaminophen    Suspension .. 650 every 6 hours PRN  aMIOdarone    Tablet 200 daily  clonazePAM  Tablet 0.5 at bedtime  dexMEDEtomidine Infusion 0.5 <Continuous>  insulin regular Infusion 3 <Continuous>  octreotide  Injectable 50 every 8 hours  pantoprazole Infusion 8 <Continuous>  simethicone 80 every 8 hours PRN      Vital Signs Last 24 Hrs  T(F): 100 (07-30-21 @ 08:00), Max: 105.8 (07-27-21 @ 00:00)    Vital Signs Last 24 Hrs  HR: 71 (07-30-21 @ 09:45) (56 - 108)  BP: --  RR: 20 (07-30-21 @ 09:45)  SpO2: 98% (07-30-21 @ 09:45) (92% - 100%)  Wt(kg): --    EXAM:  GENERAL: Unable to speak as he is trached; ill-looking  HEAD: Atraumatic, Normocephalic  EYES: EOMI, PERRLA, conjunctiva pink and cornea white  ENT: Normal external ears and nose, no discharges; moist mucous membranes, no erythema on posterior oropharynx; NGT in place, no oral thrush  NECK: Supple, nontender to palpation; no JVD; trach in place, vented  CHEST/LUNG: Good air entry bilaterally; right chest tube in place  HEART: Regular rate and rhythm; LVAD mechanical sound heard on auscultation  ABDOMEN: Soft, nondistended; no rebound tenderness, no guarding; no hepatomegaly; normoactive bowel sounds; LVAD exit site looks clean and without erythema  EXTREMITIES: extremities cold to touch, 2+ Peripheral Pulses, brisk capillary refill. No clubbing, cyanosis, or petal edema  NERVOUS SYSTEM: Sedated  MSK: Sedated; no joint erythema, swelling or pain  SKIN: Superficial thrombophlebitis on right UE; RIJ central line site clean    Labs:                        7.6    11.13 )-----------( 104      ( 30 Jul 2021 01:18 )             24.1     07-30    135  |  101  |  13  ----------------------------<  141<H>  4.3   |  25  |  0.57    Ca    9.1      30 Jul 2021 00:58  Phos  1.5     07-30  Mg     1.9     07-30    TPro  6.4  /  Alb  3.0<L>  /  TBili  1.3<H>  /  DBili  x   /  AST  117<H>  /  ALT  210<H>  /  AlkPhos  224<H>  07-30      WBC Trend:  WBC Count: 11.13 (07-30-21 @ 01:18)  WBC Count: 12.47 (07-29-21 @ 06:10)  WBC Count: 11.78 (07-29-21 @ 00:28)  WBC Count: 12.84 (07-28-21 @ 20:55)      Creatine Trend:  Creatinine, Serum: 0.57 (07-30)  Creatinine, Serum: 0.57 (07-29)  Creatinine, Serum: 0.55 (07-28)  Creatinine, Serum: 1.06 (07-27)      Liver Biochemical Testing Trend:  Alanine Aminotransferase (ALT/SGPT): 210 *H* (07-30)  Alanine Aminotransferase (ALT/SGPT): 237 *H* (07-29)  Alanine Aminotransferase (ALT/SGPT): 157 *H* (07-28)  Alanine Aminotransferase (ALT/SGPT): 48 *H* (07-27)  Alanine Aminotransferase (ALT/SGPT): 15 (07-26)  Aspartate Aminotransferase (AST/SGOT): 117 (07-30-21 @ 00:58)  Aspartate Aminotransferase (AST/SGOT): 209 (07-29-21 @ 00:28)  Aspartate Aminotransferase (AST/SGOT): 195 (07-28-21 @ 00:32)  Aspartate Aminotransferase (AST/SGOT): 68 (07-27-21 @ 00:34)  Aspartate Aminotransferase (AST/SGOT): 18 (07-26-21 @ 05:58)  Bilirubin Total, Serum: 1.3 (07-30)  Bilirubin Total, Serum: 0.9 (07-29)  Bilirubin Total, Serum: 1.7 (07-28)  Bilirubin Total, Serum: 1.1 (07-27)  Bilirubin Total, Serum: 0.6 (07-26)      Trend LDH  07-30-21 @ 00:58  281<H>  07-28-21 @ 00:32  430<H>  07-27-21 @ 00:34  300<H>  07-26-21 @ 07:29  240  07-26-21 @ 05:58  242          MICROBIOLOGY:    MR[facility].: [facility] (06-14-21 @ 23:02)      Culture - Blood (collected 29 Jul 2021 03:37)  Source: .Blood Blood-Peripheral  Preliminary Report:    No growth to date.    Culture - Blood (collected 28 Jul 2021 10:19)  Source: .Blood Blood-Venous  Preliminary Report:    No growth to date.    Culture - Blood (collected 28 Jul 2021 10:19)  Source: .Blood Blood-Peripheral  Preliminary Report:    No growth to date.    Culture - Blood (collected 27 Jul 2021 19:10)  Source: .Blood Blood  Preliminary Report:    No growth to date.    Culture - Blood (collected 27 Jul 2021 19:10)  Source: .Blood Blood  Preliminary Report:    No growth to date.    Culture - Sputum (collected 27 Jul 2021 19:08)  Source: .Sputum Sputum  Final Report:    Moderate Serratia marcescens    Moderate Stenotrophomonas maltophilia    Normal Respiratory Bria present  Organism: Serratia marcescens  Stenotrophomonas maltophilia  Organism: Stenotrophomonas maltophilia    Sensitivities:      -  Ceftazidime: S 4      -  Levofloxacin: S <=0.5      -  Trimethoprim/Sulfamethoxazole: S <=0.5/9.5      Method Type: CLAU  Organism: Serratia marcescens    Sensitivities:      -  Amikacin: S <=16      -  Amoxicillin/Clavulanic Acid: R >16/8      -  Ampicillin: R 16 These ampicillin results predict results for amoxicillin      -  Ampicillin/Sulbactam: R 16/8 Enterobacter, Citrobacter, and Serratia may develop resistance during prolonged therapy (3-4 days)      -  Aztreonam: S <=4      -  Cefazolin: R >16 Enterobacter, Citrobacter, and Serratia may develop resistance during prolonged therapy (3-4 days)      -  Cefepime: S <=2      -  Cefoxitin: R 16      -  Ceftriaxone: S <=1 Enterobacter, Citrobacter, and Serratia may develop resistance during prolonged therapy      -  Ciprofloxacin: S <=0.25      -  Ertapenem: S <=0.5      -  Gentamicin: S <=2      -  Levofloxacin: S <=0.5      -  Meropenem: S <=1      -  Piperacillin/Tazobactam: S <=8      -  Tobramycin: S <=2      -  Trimethoprim/Sulfamethoxazole: S <=0.5/9.5      Method Type: CLAU    Culture - Blood (collected 26 Jul 2021 22:59)  Source: .Blood Blood-Peripheral  Final Report:    Growth in aerobic and anaerobic bottles: Serratia marcescens    See previous culture  [id number]    Culture - Blood (collected 26 Jul 2021 09:12)  Source: .Blood Blood-Peripheral  Final Report:    Growth in aerobic and anaerobic bottles: Serratia marcescens    ***Blood Panel PCR results on this specimen are available    approximately 3 hours after the Gram stain result.***    Gram stain, PCR, and/or culture results may not always    correspond due to difference in methodologies.    ************************************************************    This PCR assay was performed by multiplex PCR. This    Assay tests for 66 bacterial and resistance gene targets.    Please refer to the [facility] test directory    at [email or web address] for details.  Organism: Blood Culture PCR  Serratia marcescens  Organism: Serratia marcescens    Sensitivities:      -  Amikacin: S <=16      -  Ampicillin: R >16 These ampicillin results predict results for amoxicillin      -  Ampicillin/Sulbactam: R >16/8 Enterobacter, Citrobacter, and Serratia may develop resistance during prolonged therapy (3-4 days)      -  Aztreonam: S <=4      -  Cefazolin: R >16 Enterobacter, Citrobacter, and Serratia may develop resistance during prolonged therapy (3-4 days)      -  Cefepime: S <=2      -  Cefoxitin: R <=8      -  Ceftriaxone: S <=1 Enterobacter, Citrobacter, and Serratia may develop resistance during prolonged therapy      -  Ciprofloxacin: S <=0.25      -  Ertapenem: S <=0.5      -  Gentamicin: S <=2      -  Levofloxacin: S <=0.5      -  Meropenem: S <=1      -  Piperacillin/Tazobactam: S <=8      -  Tobramycin: S 4      -  Trimethoprim/Sulfamethoxazole: S <=0.5/9.5      Method Type: CLAU  Organism: Blood Culture PCR    Sensitivities:      -  Serratia marcescens: Detec      Method Type: PCR    Culture - Blood (collected 14 Jul 2021 22:13)  Source: .Blood Blood  Final Report:    No Growth Final    Culture - Blood (collected 14 Jul 2021 22:13)  Source: .Blood Blood  Final Report:    No Growth Final    HIV-1/2 Combo Result: Nonreact (01-14-21 @ 08:18)    ABS CD4: 129 /uL (04-07-20 @ 08:38)      COVID-19 PCR: NotDetec (06-14-21 @ 12:24)    COVID-19 [place] Domain AB Interp: Positive (06-15-21 @ 10:15)  COVID-19 IgG Antibody Interpretation: Positive (01-14-21 @ 08:55)  COVID-19 IgG Antibody Interpretation: Positive (09-08-20 @ 08:50)      Lactate Dehydrogenase, Serum: 281 (07-30)  Lactate Dehydrogenase, Serum: 430 (07-28)  Lactate Dehydrogenase, Serum: 300 (07-27)  Lactate Dehydrogenase, Serum: 240 (07-26)  Lactate Dehydrogenase, Serum: 242 (07-26)  Lactate Dehydrogenase, Serum: 298 (07-26)  Lactate Dehydrogenase, Serum: 237 (07-25)  Lactate Dehydrogenase, Serum: 260 (07-24)    Blood Gas Arterial, Lactate: 1.0 (07-30 @ 00:54)  Blood Gas Arterial, Lactate: 0.8 (07-29 @ 09:41)  Blood Gas Arterial, Lactate: 1.0 (07-29 @ 00:28)  Blood Gas Arterial, Lactate: 1.5 (07-28 @ 15:02)      RADIOLOGY:  imaging below personally reviewed       INFECTIOUS DISEASE FOLLOW UP NOTE:    Interval History/ROS: Patient is a 66y old  Male who presents with a chief complaint of Anemia, Supratherapeutic INR, Dark Stools (30 Jul 2021 08:09)    Overnight events: Patient was afebrile overnight, Tmax 100F. BP is stable, not on pressor. He underwent percutaneous cholecystostomy tube today with IR.      REVIEW OF SYSTEMS:  Constitutional: Low grade fever  Eyes: No eye pain or discharge  ENMT: No hearing changes, pain, discharge or infections. No neck pain or stiffness.   Cardiac: No SOB, chest pain, palpitations.  Respiratory: No cough or respiratory distress  GI: No nausea, vomiting, diarrhea or abdominal pain.  : No dysuria, frequency or burning. No Discharge  MS: No myalgia, muscle weakness, joint pain or back pain.  Neuro: No headache or weakness. No LOC.  Skin: erythema on right upper arm  Except as documented in the HPI, all other systems are negative.      Primary team notes reviewed [Yes]  Other consultant notes reviewed [Yes]    Allergies:  No Known Allergies      ANTIMICROBIALS:   cefepime   IVPB 1000 every 8 hours  metroNIDAZOLE  IVPB 500 every 8 hours  vancomycin    Solution 125 every 12 hours      OTHER MEDS: MEDICATIONS  (STANDING):  acetaminophen    Suspension .. 650 every 6 hours PRN  aMIOdarone    Tablet 200 daily  clonazePAM  Tablet 0.5 at bedtime  dexMEDEtomidine Infusion 0.5 <Continuous>  insulin regular Infusion 3 <Continuous>  octreotide  Injectable 50 every 8 hours  pantoprazole Infusion 8 <Continuous>  simethicone 80 every 8 hours PRN      Vital Signs Last 24 Hrs  T(F): 100 (07-30-21 @ 08:00), Max: 105.8 (07-27-21 @ 00:00)    Vital Signs Last 24 Hrs  HR: 71 (07-30-21 @ 09:45) (56 - 108)  RR: 20 (07-30-21 @ 09:45)  SpO2: 98% (07-30-21 @ 09:45) (92% - 100%)      EXAM:  GENERAL: Unable to speak as he is trached; ill-looking  HEAD: Atraumatic, Normocephalic  EYES: EOMI, PERRLA, conjunctiva pink and cornea white  ENT: Normal external ears and nose, no discharges; moist mucous membranes, no erythema on posterior oropharynx; NGT in place, no oral thrush  NECK: Supple, nontender to palpation; no JVD; trach in place, vented  CHEST/LUNG: Good air entry bilaterally; right chest tube in place  HEART: Regular rate and rhythm; LVAD mechanical sound heard on auscultation  ABDOMEN: Soft, nondistended; no rebound tenderness, no guarding; no hepatomegaly; normoactive bowel sounds; LVAD exit site looks clean and without erythema; cholecystostomy tube in place, draining brown fluid  EXTREMITIES: 2+ Peripheral Pulses, brisk capillary refill. No clubbing, cyanosis, or petal edema  NERVOUS SYSTEM: Opens eye and answer questions with nods  MSK: Sedated; no joint erythema, swelling or pain  SKIN: Superficial thrombophlebitis on right UE; RIJ central line site clean    Labs:                        7.6    11.13 )-----------( 104      ( 30 Jul 2021 01:18 )             24.1     07-30    135  |  101  |  13  ----------------------------<  141<H>  4.3   |  25  |  0.57    Ca    9.1      30 Jul 2021 00:58  Phos  1.5     07-30  Mg     1.9     07-30    TPro  6.4  /  Alb  3.0<L>  /  TBili  1.3<H>  /  DBili  x   /  AST  117<H>  /  ALT  210<H>  /  AlkPhos  224<H>  07-30      WBC Trend:  WBC Count: 11.13 (07-30-21 @ 01:18)  WBC Count: 12.47 (07-29-21 @ 06:10)  WBC Count: 11.78 (07-29-21 @ 00:28)  WBC Count: 12.84 (07-28-21 @ 20:55)      Creatine Trend:  Creatinine, Serum: 0.57 (07-30)  Creatinine, Serum: 0.57 (07-29)  Creatinine, Serum: 0.55 (07-28)  Creatinine, Serum: 1.06 (07-27)      Liver Biochemical Testing Trend:  Alanine Aminotransferase (ALT/SGPT): 210 *H* (07-30)  Alanine Aminotransferase (ALT/SGPT): 237 *H* (07-29)  Alanine Aminotransferase (ALT/SGPT): 157 *H* (07-28)  Alanine Aminotransferase (ALT/SGPT): 48 *H* (07-27)  Alanine Aminotransferase (ALT/SGPT): 15 (07-26)  Aspartate Aminotransferase (AST/SGOT): 117 (07-30-21 @ 00:58)  Aspartate Aminotransferase (AST/SGOT): 209 (07-29-21 @ 00:28)  Aspartate Aminotransferase (AST/SGOT): 195 (07-28-21 @ 00:32)  Aspartate Aminotransferase (AST/SGOT): 68 (07-27-21 @ 00:34)  Aspartate Aminotransferase (AST/SGOT): 18 (07-26-21 @ 05:58)  Bilirubin Total, Serum: 1.3 (07-30)  Bilirubin Total, Serum: 0.9 (07-29)  Bilirubin Total, Serum: 1.7 (07-28)  Bilirubin Total, Serum: 1.1 (07-27)  Bilirubin Total, Serum: 0.6 (07-26)      Trend LDH  07-30-21 @ 00:58  281<H>  07-28-21 @ 00:32  430<H>  07-27-21 @ 00:34  300<H>  07-26-21 @ 07:29  240  07-26-21 @ 05:58  242    MICROBIOLOGY:    [facility].: [facility] (06-14-21 @ 23:02)      Culture - Blood (collected 29 Jul 2021 03:37)  Source: .Blood Blood-Peripheral  Preliminary Report:    No growth to date.    Culture - Blood (collected 28 Jul 2021 10:19)  Source: .Blood Blood-Venous  Preliminary Report:    No growth to date.    Culture - Blood (collected 28 Jul 2021 10:19)  Source: .Blood Blood-Peripheral  Preliminary Report:    No growth to date.    Culture - Blood (collected 27 Jul 2021 19:10)  Source: .Blood Blood  Preliminary Report:    No growth to date.    Culture - Blood (collected 27 Jul 2021 19:10)  Source: .Blood Blood  Preliminary Report:    No growth to date.    Culture - Sputum (collected 27 Jul 2021 19:08)  Source: .Sputum Sputum  Final Report:    Moderate Serratia marcescens    Moderate Stenotrophomonas maltophilia    Normal Respiratory Bria present  Organism: Serratia marcescens  Stenotrophomonas maltophilia  Organism: Stenotrophomonas maltophilia    Sensitivities:      -  Ceftazidime: S 4      -  Levofloxacin: S <=0.5      -  Trimethoprim/Sulfamethoxazole: S <=0.5/9.5      Method Type: CLAU  Organism: Serratia marcescens    Sensitivities:      -  Amikacin: S <=16      -  Amoxicillin/Clavulanic Acid: R >16/8      -  Ampicillin: R 16 These ampicillin results predict results for amoxicillin      -  Ampicillin/Sulbactam: R 16/8 Enterobacter, Citrobacter, and Serratia may develop resistance during prolonged therapy (3-4 days)      -  Aztreonam: S <=4      -  Cefazolin: R >16 Enterobacter, Citrobacter, and Serratia may develop resistance during prolonged therapy (3-4 days)      -  Cefepime: S <=2      -  Cefoxitin: R 16      -  Ceftriaxone: S <=1 Enterobacter, Citrobacter, and Serratia may develop resistance during prolonged therapy      -  Ciprofloxacin: S <=0.25      -  Ertapenem: S <=0.5      -  Gentamicin: S <=2      -  Levofloxacin: S <=0.5      -  Meropenem: S <=1      -  Piperacillin/Tazobactam: S <=8      -  Tobramycin: S <=2      -  Trimethoprim/Sulfamethoxazole: S <=0.5/9.5      Method Type: CLAU    Culture - Blood (collected 26 Jul 2021 22:59)  Source: .Blood Blood-Peripheral  Final Report:    Growth in aerobic and anaerobic bottles: Serratia marcescens    See previous culture  [id number]    Culture - Blood (collected 26 Jul 2021 09:12)  Source: .Blood Blood-Peripheral  Final Report:    Growth in aerobic and anaerobic bottles: Serratia marcescens    ***Blood Panel PCR results on this specimen are available    approximately 3 hours after the Gram stain result.***    Gram stain, PCR, and/or culture results may not always    correspond due to difference in methodologies.    ************************************************************    This PCR assay was performed by multiplex PCR. This    Assay tests for 66 bacterial and resistance gene targets.    Please refer to the [facility] test directory    at [email or web address] for details.  Organism: Blood Culture PCR  Serratia marcescens  Organism: Serratia marcescens    Sensitivities:      -  Amikacin: S <=16      -  Ampicillin: R >16 These ampicillin results predict results for amoxicillin      -  Ampicillin/Sulbactam: R >16/8 Enterobacter, Citrobacter, and Serratia may develop resistance during prolonged therapy (3-4 days)      -  Aztreonam: S <=4      -  Cefazolin: R >16 Enterobacter, Citrobacter, and Serratia may develop resistance during prolonged therapy (3-4 days)      -  Cefepime: S <=2      -  Cefoxitin: R <=8      -  Ceftriaxone: S <=1 Enterobacter, Citrobacter, and Serratia may develop resistance during prolonged therapy      -  Ciprofloxacin: S <=0.25      -  Ertapenem: S <=0.5      -  Gentamicin: S <=2      -  Levofloxacin: S <=0.5      -  Meropenem: S <=1      -  Piperacillin/Tazobactam: S <=8      -  Tobramycin: S 4      -  Trimethoprim/Sulfamethoxazole: S <=0.5/9.5      Method Type: CLAU  Organism: Blood Culture PCR    Sensitivities:      -  Serratia marcescens: Detec      Method Type: PCR    Culture - Blood (collected 14 Jul 2021 22:13)  Source: .Blood Blood  Final Report:    No Growth Final    Culture - Blood (collected 14 Jul 2021 22:13)  Source: .Blood Blood  Final Report:    No Growth Final    HIV-1/2 Combo Result: Nonreact (01-14-21 @ 08:18)    ABS CD4: 129 /uL (04-07-20 @ 08:38)      COVID-19 PCR: NotDetec (06-14-21 @ 12:24)    COVID-19 [place] Domain AB Interp: Positive (06-15-21 @ 10:15)  COVID-19 IgG Antibody Interpretation: Positive (01-14-21 @ 08:55)  COVID-19 IgG Antibody Interpretation: Positive (09-08-20 @ 08:50)      Lactate Dehydrogenase, Serum: 281 (07-30)  Lactate Dehydrogenase, Serum: 430 (07-28)  Lactate Dehydrogenase, Serum: 300 (07-27)  Lactate Dehydrogenase, Serum: 240 (07-26)  Lactate Dehydrogenase, Serum: 242 (07-26)  Lactate Dehydrogenase, Serum: 298 (07-26)  Lactate Dehydrogenase, Serum: 237 (07-25)  Lactate Dehydrogenase, Serum: 260 (07-24)    Blood Gas Arterial, Lactate: 1.0 (07-30 @ 00:54)  Blood Gas Arterial, Lactate: 0.8 (07-29 @ 09:41)  Blood Gas Arterial, Lactate: 1.0 (07-29 @ 00:28)  Blood Gas Arterial, Lactate: 1.5 (07-28 @ 15:02)      RADIOLOGY:  imaging below personally reviewed

## 2021-07-30 NOTE — PROGRESS NOTE ADULT - ASSESSMENT
Mr. Vic Quispe initially admitted for melena and anemia in the setting of supratherapeutic INR.  GI reconsulted for worsening anemia.    # Acute blood loss anemia and melena - hemodynamically unchanged. Likely represents recurrent GI bleed. Initial melena workup lead to capsule endoscopy on 6/14/2021 which revealed active bleeding in small bowel.  Single balloon 6/15/2021 revealed old blood in proximal ileum. 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  Plan for repeat capsule endoscopy, with further studies pending results and clinical course.  # Recent melena with presumed small bowel source  # LVAD on anticoagulation with coumadin  # Trach  # Septic shock    Recommendations:  - PPI 40mg IV BID  - trend vitals, CBC, and monitor for clinical signs of bleeding  - transfuse for goal hemoglobin >/= 7.0  - Cardiology managing anticoagulation  - successfully placed percutaneous dawn tube, pressors weaned off over passed 24 hours, however patient still being treated with antibiotics and on ventilator  - no further evidence of overt GI bleeding.  Continue to trend CBC and if clinically improved, will consider enteroscopy +/- capsule      Tito Guerrier, PGY-4  GI/Hepatology Fellow    MONDAY-FRIDAY 8AM-5PM:  Available via [employer] Teams  Pager# [phone] ([facility]) or [phone] ([facility])  GI Phone# [phone] ([facility])    NON-URGENT CONSULTS:  Please email [email or web address] OR [email or web address]  AT NIGHT AND ON WEEKENDS:  Contact on-call GI fellow via answering service ([phone]) from 5pm-8am and on weekends/holidays     Mr. Vic Quispe initially admitted for melena and anemia in the setting of supratherapeutic INR.  GI reconsulted for worsening anemia.    # Acute blood loss anemia and melena - hemodynamically unchanged. Likely represents recurrent GI bleed. Initial melena workup lead to capsule endoscopy on 6/14/2021 which revealed active bleeding in small bowel.  Single balloon 6/15/2021 revealed old blood in proximal ileum. 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  Plan for repeat capsule endoscopy, with further studies pending results and clinical course.  # Recent melena with presumed small bowel source  # LVAD on anticoagulation with coumadin  # Trach  # Septic shock    Recommendations:  - PPI 40mg IV BID  - trend vitals, CBC, and monitor for clinical signs of bleeding  - transfuse for goal hemoglobin >/= 7.0  - Cardiology managing anticoagulation  - successfully placed percutaneous dawn tube, pressors weaned off over passed 24 hours, however patient still being treated with antibiotics and on ventilator  - no further evidence of overt GI bleeding.  Continue to trend CBC and if clinically improved, will consider enteroscopy +/- capsule only if bleeding persists and vitals are optimized      Tito Guerrier, PGY-4  GI/Hepatology Fellow    MONDAY-FRIDAY 8AM-5PM:  Available via [employer] Teams  Pager# [phone] ([facility]) or [phone] ([facility])  GI Phone# [phone] ([facility])    NON-URGENT CONSULTS:  Please email [email or web address]  AT NIGHT AND ON WEEKENDS:  Contact on-call GI fellow via answering service ([phone]) from 5pm-8am and on weekends/holidays

## 2021-07-30 NOTE — PROGRESS NOTE ADULT - PROBLEM SELECTOR PLAN 3
- covering broadly on vanc/cef/flagyl  - off pressors   - keep slight negative in s/o elevated CVP  - s/p C tube w/ IR

## 2021-07-31 NOTE — PROGRESS NOTE ADULT - SUBJECTIVE AND OBJECTIVE BOX
RICKY HAMPTON  MRN-[id number]  Patient is a 66y old  Male who presents with a chief complaint of Anemia, Supratherapeutic INR, Dark Stools (2021 13:09)    HPI:  65M PMH ACC/AHA stage D HF due to NICM HM2 LVAD , TV annuloplasty ring 17 as destination therapy due to severe peripheral artery disease with significant stenosis  SIADH, Depression, CKD-3 with hyperkalemia, past E. coli UTIs, driveline drainage (21) and COVID-19 (back in 2020)  He was recently seen in clinic where he complained of abdominal pain and dark stools w constipation back in May. He presents to [facility] ER today weakness and fatigue, moderate and + Black stools for three days, on coumadin secondary to warfarin use in the setting of an LVAD. Patient has required transfusions for GIB in the past. Mostly recently back in 2021 pt had anemia with dark stools. No interventions was done at that time. However Last Endoscopy was done in 2020 (negative). Today labs show patient is anemic with H/H of 4.5/16.3,. INR is 8.84 MAP in the 90s, Temp 35.1. He denies any chest pain, shortness of breath, dizziness, abd pain, nausea or vomiting.     (2021 16:57)    Surgery/Hospital course:   admit for melena w/ anemia, INR 8.84   6/15 Capsul study (+) for small bowel bleed, balloon endoscopy (old blood in prox ileum); post EGD - septic w/ L opacity, re-intubated for concern for aspiration, TTE (Mod MR, decrease biV w/ interventricular septum boweing towards R)   bronch    +C Diff    TC    CT C/A/P: Fluid filled colon which may be 2/2 rapid transit. Small bilateral pleffs with associates. Compressive atelectasis New ISABELLE & LLL  parenchymal opacities, suspicious for pneumonia. 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    #8 Shiley trach at bedside    CPAP trials    LVAD speed increased to 9200   Bronch; Central line dc'ed   TC since , continue as tolerated. Patient transferred to SDU.    Cont PT, no trach downsize today(#8cuffed)/ Anticoagulation/ Continue TF, speech f/u/ Vanco taper    oob to chair  INR  2.47  5 mg coumadin     increased lop to 25 q8  per HF   INR today 2.64.  H&H 7.3 this AM.  Will repeat CBC at noon, and will send stool guaiac Patient with persistent abdominal tenderness, rate of tube feeds decreased.  No nausea/vomiting.     INR today 2.4H&H 9.1/.6 low flow overnight /N&V  NPO resolved for capsule study  Coumadin on hold refusing Tube feeds on D5 1/ normal  @50 cc/hr   INR 2.69  H&H 7..1 refusing Tube feeds on D5 1/2 normal  @50 cc/hr. This am + BM Anusha Oneill HF  aware- PRBC x1  GI team consulted -  NPO  plan on study in am-  D/w Dr Cadet Patient  to return to CTU for further management; 1PRBCS    Post op INR 2.2 today.  No bleeding. BC + for SM.  Pt is hypotensive requiring pressor and inotropic support.  ID follow up today on Cefepime will follow.   R PTC for PTX    CT C/A/P: sub q emphysema in R chest wall, GGO RUL, small ascites CTH negative; Abd US: GB thickening, pericholecystic fluid    : Cholecystostomy tube this morning with IR and some black bile, will monitor site closely. As per GI, not stable for endoscopic evaluation currently. May consider enterscopy in the future once patient is stable. Fever of 100F.     Today/Overnight: no vent weaning    Vital Signs Last 24 Hrs  ICU Vital Signs Last 24 Hrs  T(C): 37.7 (2021 16:00), Max: 38.2 (2021 20:00)  T(F): 99.9 (2021 16:00), Max: 100.8 (2021 20:00)  HR: 81 (2021 18:00) (60 - 92)  BP(mean): 71  ABP: 77/68 (2021 18:00) (70/62 - 93/74)  ABP(mean): 71 (2021 18:00) (65 - 82)  RR: 20 (2021 18:00) (20 - 30)  SpO2: 100% (2021 18:00) (80% - 100%)    ============================I/O===========================  I&O's Detail    2021 07:01  -  2021 07:00  --------------------------------------------------------  IN:    Dexmedetomidine: 148.9 mL    Enteral Tube Flush: 120 mL    Insulin: 8 mL    IV PiggyBack: 625 mL    IV PiggyBack: 250 mL    Miscellaneous Tube Feedin mL    Pantoprazole: 240 mL    sodium chloride 0.9%: 205 mL  Total IN: 1956.9 mL    OUT:    Chest Tube (mL): 50 mL    Drain (mL): 130 mL    Indwelling Catheter - Urethral (mL): 1705 mL  Total OUT: 1885 mL    Total NET: 71.9 mL      2021 07:01  -  2021 19:58  --------------------------------------------------------  IN:    Dexmedetomidine: 42.9 mL    Insulin: 6 mL    Miscellaneous Tube Feedin mL    Pantoprazole: 120 mL    sodium chloride 0.9%: 50 mL  Total IN: 718.9 mL    OUT:    Chest Tube (mL): 30 mL    Drain (mL): 210 mL    Indwelling Catheter - Urethral (mL): 450 mL  Total OUT: 690 mL    Total NET: 28.9 mL    ============================ LABS =========================                        7.6    11.13 )-----------( 104      ( 2021 01:18 )             24.1     135  |  101  |  13  ----------------------------<  141<H>  4.3   |  25  |  0.57    Ca    9.1      2021 00:58  Phos  1.5       Mg     1.9         TPro  6.4  /  Alb  3.0<L>  /  TBili  1.3<H>  /  DBili  x   /  AST  117<H>  /  ALT  210<H>  /  AlkPhos  224<H>      LIVER FUNCTIONS - ( 2021 00:58 )  Alb: 3.0 g/dL / Pro: 6.4 g/dL / ALK PHOS: 224 U/L / ALT: 210 U/L / AST: 117 U/L / GGT: x           PT/INR - ( 2021 00:28 )   PT: 14.4 sec;   INR: 1.21 ratio      ABG - ( 2021 00:54 )  pH, Arterial: 7.49  pH, Blood: x     /  pCO2: 38    /  pO2: 168   / HCO3: 28    / Base Excess: 4.9   /  SaO2: 100       ======================Micro/Rad/Cardio=================  Culture: Reviewed   CXR: Reviewed  Echo: Reviewed    ======================================================  PAST MEDICAL & SURGICAL HISTORY:  CHF (congestive heart failure)    CAD (coronary artery disease)    Depression    Pleural effusion    History of 2019 novel coronavirus disease (COVID-19)  2020    Hemorrhoids    Bleeding hemorrhoids    Peripheral arterial disease    Claudication    BPH with urinary obstruction    ACC/AHA stage D systolic heart failure    Anticoagulation goal of INR 2.0 to 2.5    Falls    Clavicle fracture    CKD (chronic kidney disease), stage III    Iron deficiency anemia    H/O epistaxis    Vertigo    GI bleed    S/P TVR (tricuspid valve repair)    S/P ventricular assist device    S/P endoscopy    ========================ASSESSMENT ================  Stage D Nonischemic Cardiomyopathy, Status Post HM2 on 2017    Cardiogenic shock  Hemodynamic instability   Acute hypoxemic respiratory failure  GI bleed , Status Post Enteroscopy   Anemia, in setting of melena   Thrombocytopenia   Leukocytosis   Chronic Kidney Disease  Stress hyperglycemia   C.diff positive on    Hypovolemic shock    Plan:  ====================== NEUROLOGY=====================  CTH on  with no acute intracranial hemorrhage or acute territorial infarction   Sedated with IV Precedex to maintain vent synchrony   Tylenol PRN for analgesia   C/w clonazepam for agitation    acetaminophen    Suspension .. 650 milliGRAM(s) Oral every 6 hours PRN Mild Pain (1 - 3)  clonazePAM  Tablet 0.5 milliGRAM(s) Oral at bedtime  dexMEDEtomidine Infusion 0.5 MICROgram(s)/kG/Hr (9.81 mL/Hr) IV Continuous <Continuous>    ==================== RESPIRATORY======================  Acute hypoxemic respiratory failure s/p #8 shiley trach on ; CPAP/TC vent weaning as tolerated. Patient was unable to do any vent weaning today 2/2 perc dawn.  Requiring full vent support, continue close monitoring of respiratory rate and breathing pattern, following of ABG’s with A-line monitoring, continuous pulse oximetry monitoring.   R PTC placed on  for mod R PTX, continue to monitor output.   CT chest : Sub q emphysema in R chest wall, GGO RUL     Mechanical Ventilation:  Mode: AC/ CMV (Assist Control/ Continuous Mandatory Ventilation)  RR (machine): 20  TV (machine): 500  FiO2: 40  PEEP: 5  ITime: 1  MAP: 10  PIP: 30    ====================CARDIOVASCULAR==================  Stage D Nonischemic Cardiomyopathy, Status Post HM2 on 2017; LVAD settings 9200 with flow of 4.9  TTE : reveals at least moderate MR, mild AI, severe global LV syst dysfxn, dec RV fxn.  Continue invasive hemodynamic monitoring   Rate control with amiodarone     aMIOdarone    Tablet 200 milliGRAM(s) Oral daily    ===================HEMATOLOGIC/ONC ===================  Anemia due to acute blood loss associated with upper GI bleed   Thrombocytopenia, 104k  Monitor H&H/Plts     ===================== RENAL =========================  Continue monitoring urine output, I&OS, BUN/Cr   Replete lytes PRN. Keep K> 4 and Mg >2   Continue diuresis prn for goal CVP 8-10.     ==================== GASTROINTESTINAL===================  Tolerating Jevity 1.2 shantelle TF, @ goal 60 cc/hr.   GI bleed in setting of recent melena, GI following - continue Protonix drip   As per GI, not stable for endoscopic evaluation, transfuse prn and f/u repeat CBC. May consider enteroscopy when stabilized.   CT A/P on : small ascites; US yesterday showing GB wall thickening w/ surrounding pericholecystic fluid  S/p cholecystostomy tube this AM with IR with -60cc of black bile, will monitor site closely.   C.diff + on , continue vanco   Simethicone PRN dyspepsia     pantoprazole Infusion 8 mG/Hr (10 mL/Hr) IV Continuous <Continuous>  simethicone 80 milliGRAM(s) Chew every 8 hours PRN Dyspepsia  sodium chloride 0.9%. 1000 milliLiter(s) (5 mL/Hr) IV Continuous <Continuous>    =======================    ENDOCRINE  =====================  Stress hyperglycemia, continue glucose control with IV insulin drip     insulin regular Infusion 3 Unit(s)/Hr (3 mL/Hr) IV Continuous <Continuous>  octreotide  Injectable 50 MICROGram(s) IV Push every 8 hours    ========================INFECTIOUS DISEASE================  Temp 99.3F-> 100F, WBC downtrending 12.47->11.13  Monitor temperature and trend WBC   BCx+  +Serratia marcescens c/w cefepime and metronidazole. Per ID, will consider adding bactrim if clinical condition worsens.   C.diff + on , continue vanco     cefepime   IVPB 1000 milliGRAM(s) IV Intermittent every 8 hours  metroNIDAZOLE  IVPB 500 milliGRAM(s) IV Intermittent every 8 hours  vancomycin    Solution 125 milliGRAM(s) Oral every 12 hours    Patient requires continuous monitoring with bedside rhythm monitoring, pulse oximetry monitoring, and continuous central venous and arterial pressure monitoring; and intermittent blood gas analysis.  Care plan discussed with ICU care team.    Patient remained critical, at risk for life threatening decompensation.   I have spent 35 minutes providing acute care with multiple re-evaluations throughout the evening.     By signing my name below, I, Daniela Chowdary, attest that this documentation has been prepared under the direction and in the presence of CLAUDIA Mejia.  Electronically signed: Cesia Suresh, 21 @ 19:35    I, CLAUDIA Mejia, personally performed the services described in this documentation. All medical record entries made by the luisibmel were at my direction and in my presence. I have reviewed the chart and agree that the record reflects my personal performance and is accurate and complete  Electronically signed: CLAUDIA Mejia, 21 @ 19:35 RICKY HAMPTON  MRN-[id number]  Patient is a 66y old  Male who presents with a chief complaint of Anemia, Supratherapeutic INR, Dark Stools (2021 13:09)    HPI:  65M PMH ACC/AHA stage D HF due to NICM HM2 LVAD , TV annuloplasty ring 17 as destination therapy due to severe peripheral artery disease with significant stenosis  SIADH, Depression, CKD-3 with hyperkalemia, past E. coli UTIs, driveline drainage (21) and COVID-19 (back in 2020)  He was recently seen in clinic where he complained of abdominal pain and dark stools w constipation back in May. He presents to [facility] ER today weakness and fatigue, moderate and + Black stools for three days, on coumadin secondary to warfarin use in the setting of an LVAD. Patient has required transfusions for GIB in the past. Mostly recently back in 2021 pt had anemia with dark stools. No interventions was done at that time. However Last Endoscopy was done in 2020 (negative). Today labs show patient is anemic with H/H of 4.5/16.3,. INR is 8.84 MAP in the 90s, Temp 35.1. He denies any chest pain, shortness of breath, dizziness, abd pain, nausea or vomiting.     (2021 16:57)    Surgery/Hospital course:   admit for melena w/ anemia, INR 8.84   6/15 Capsul study (+) for small bowel bleed, balloon endoscopy (old blood in prox ileum); post EGD - septic w/ L opacity, re-intubated for concern for aspiration, TTE (Mod MR, decrease biV w/ interventricular septum boweing towards R)   bronch    +C Diff    TC    CT C/A/P: Fluid filled colon which may be 2/2 rapid transit. Small bilateral pleffs with associates. Compressive atelectasis New ISABELLE & LLL  parenchymal opacities, suspicious for pneumonia. 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    #8 Shiley trach at bedside    CPAP trials    LVAD speed increased to 9200   Bronch; Central line dc'ed   TC since , continue as tolerated. Patient transferred to SDU.    Cont PT, no trach downsize today(#8cuffed)/ Anticoagulation/ Continue TF, speech f/u/ Vanco taper    oob to chair  INR  2.47  5 mg coumadin     increased lop to 25 q8  per HF   INR today 2.64.  H&H 7.3 this AM.  Will repeat CBC at noon, and will send stool guaiac Patient with persistent abdominal tenderness, rate of tube feeds decreased.  No nausea/vomiting.     INR today 2.4H&H 9.1/.6 low flow overnight /N&V  NPO resolved for capsule study  Coumadin on hold refusing Tube feeds on D5 1/ normal  @50 cc/hr   INR 2.69  H&H 7..1 refusing Tube feeds on D5 1/2 normal  @50 cc/hr. This am + BM Anusha Oneill HF  aware- PRBC x1  GI team consulted -  NPO  plan on study in am-  D/w Dr Cadet Patient  to return to CTU for further management; 1PRBCS    Post op INR 2.2 today.  No bleeding. BC + for SM.  Pt is hypotensive requiring pressor and inotropic support.  ID follow up today on Cefepime will follow.   R PTC for PTX    CT C/A/P: sub q emphysema in R chest wall, GGO RUL, small ascites CTH negative; Abd US: GB thickening, pericholecystic fluid    : Cholecystostomy tube this morning with IR and some black bile, will monitor site closely. As per GI, not stable for endoscopic evaluation currently. May consider enterscopy in the future once patient is stable. Fever of 100F.     Today/Overnight: no vent weaning    Vital Signs Last 24 Hrs  ICU Vital Signs Last 24 Hrs  T(C): 37.7 (2021 16:00), Max: 38.2 (2021 20:00)  T(F): 99.9 (2021 16:00), Max: 100.8 (2021 20:00)  HR: 81 (2021 18:00) (60 - 92)  BP(mean): 71  ABP: 77/68 (2021 18:00) (70/62 - 93/74)  ABP(mean): 71 (2021 18:00) (65 - 82)  RR: 20 (2021 18:00) (20 - 30)  SpO2: 100% (2021 18:00) (80% - 100%)    ============================I/O===========================  I&O's Detail    2021 07:01  -  2021 07:00  --------------------------------------------------------  IN:    Dexmedetomidine: 148.9 mL    Enteral Tube Flush: 120 mL    Insulin: 8 mL    IV PiggyBack: 625 mL    IV PiggyBack: 250 mL    Miscellaneous Tube Feedin mL    Pantoprazole: 240 mL    sodium chloride 0.9%: 205 mL  Total IN: 1956.9 mL    OUT:    Chest Tube (mL): 50 mL    Drain (mL): 130 mL    Indwelling Catheter - Urethral (mL): 1705 mL  Total OUT: 1885 mL    Total NET: 71.9 mL      2021 07:01  -  2021 19:58  --------------------------------------------------------  IN:    Dexmedetomidine: 42.9 mL    Insulin: 6 mL    Miscellaneous Tube Feedin mL    Pantoprazole: 120 mL    sodium chloride 0.9%: 50 mL  Total IN: 718.9 mL    OUT:    Chest Tube (mL): 30 mL    Drain (mL): 210 mL    Indwelling Catheter - Urethral (mL): 450 mL  Total OUT: 690 mL    Total NET: 28.9 mL    ============================ LABS =========================               8.0    12.46 )-----------( 123      ( 2021 00:25 )             25.5     135  |  102  |  12  ----------------------------<  100<H>  3.6   |  23  |  0.58    Ca    8.6      2021 00:25  Phos  3.2       Mg     2.0         TPro  6.2  /  Alb  2.7<L>  /  TBili  1.2  /  DBili  x   /  AST  43<H>  /  ALT  131<H>  /  AlkPhos  194<H>      LIVER FUNCTIONS - ( 2021 00:25 )  Alb: 2.7 g/dL / Pro: 6.2 g/dL / ALK PHOS: 194 U/L / ALT: 131 U/L / AST: 43 U/L / GGT: x           ABG - ( 2021 00:06 )  pH, Arterial: 7.45  pH, Blood: x     /  pCO2: 38    /  pO2: 186   / HCO3: 26    / Base Excess: 2.2   /  SaO2: 100       ======================Micro/Rad/Cardio=================  Culture: Reviewed   CXR: Reviewed  Echo: Reviewed    ======================================================  PAST MEDICAL & SURGICAL HISTORY:  CHF (congestive heart failure)    CAD (coronary artery disease)    Depression    Pleural effusion    History of 2019 novel coronavirus disease (COVID-19)  2020    Hemorrhoids    Bleeding hemorrhoids    Peripheral arterial disease    Claudication    BPH with urinary obstruction    ACC/AHA stage D systolic heart failure    Anticoagulation goal of INR 2.0 to 2.5    Falls    Clavicle fracture    CKD (chronic kidney disease), stage III    Iron deficiency anemia    H/O epistaxis    Vertigo    GI bleed    S/P TVR (tricuspid valve repair)    S/P ventricular assist device    S/P endoscopy    ========================ASSESSMENT ================  Stage D Nonischemic Cardiomyopathy, Status Post HM2 on 2017    Cardiogenic shock  Hemodynamic instability   Acute hypoxemic respiratory failure  GI bleed , Status Post Enteroscopy   Anemia, in setting of melena   Thrombocytopenia   Leukocytosis   Chronic Kidney Disease  Stress hyperglycemia   C.diff positive on    Hypovolemic shock    Plan:  ====================== NEUROLOGY=====================  CTH on  with no acute intracranial hemorrhage or acute territorial infarction   Sedated with IV Precedex to maintain vent synchrony   Tylenol PRN for analgesia   C/w clonazepam for agitation    acetaminophen    Suspension .. 650 milliGRAM(s) Oral every 6 hours PRN Mild Pain (1 - 3)  clonazePAM  Tablet 0.5 milliGRAM(s) Oral at bedtime  dexMEDEtomidine Infusion 0.5 MICROgram(s)/kG/Hr (9.81 mL/Hr) IV Continuous <Continuous>    ==================== RESPIRATORY======================  Acute hypoxemic respiratory failure s/p #8 shiley trach on ; CPAP/TC vent weaning as tolerated. Patient was unable to do any vent weaning today 2/2 perc dawn.  Requiring full vent support, continue close monitoring of respiratory rate and breathing pattern, following of ABG’s with A-line monitoring, continuous pulse oximetry monitoring.   R PTC placed on  for mod R PTX, continue to monitor output.   CT chest : Sub q emphysema in R chest wall, GGO RUL     Mechanical Ventilation:  Mode: AC/ CMV (Assist Control/ Continuous Mandatory Ventilation)  RR (machine): 20  TV (machine): 500  FiO2: 40  PEEP: 5  ITime: 1  MAP: 10  PIP: 30    ====================CARDIOVASCULAR==================  Stage D Nonischemic Cardiomyopathy, Status Post HM2 on 2017; LVAD settings 9200 with flow of 4.9  TTE : reveals at least moderate MR, mild AI, severe global LV syst dysfxn, dec RV fxn.  Continue invasive hemodynamic monitoring   Rate control with amiodarone     aMIOdarone    Tablet 200 milliGRAM(s) Oral daily    ===================HEMATOLOGIC/ONC ===================  Anemia due to acute blood loss associated with upper GI bleed   Thrombocytopenia, 104k  Monitor H&H/Plts     ===================== RENAL =========================  Continue monitoring urine output, I&OS, BUN/Cr   Replete lytes PRN. Keep K> 4 and Mg >2   Continue diuresis prn for goal CVP 8-10.     ==================== GASTROINTESTINAL===================  Tolerating Jevity 1.2 shantelle TF, @ goal 60 cc/hr.   GI bleed in setting of recent melena, GI following - continue Protonix drip   As per GI, not stable for endoscopic evaluation, transfuse prn and f/u repeat CBC. May consider enteroscopy when stabilized.   CT A/P on : small ascites; US yesterday showing GB wall thickening w/ surrounding pericholecystic fluid  S/p cholecystostomy tube this AM with IR with -60cc of black bile, will monitor site closely.   C.diff + on , continue vanco   Simethicone PRN dyspepsia     pantoprazole Infusion 8 mG/Hr (10 mL/Hr) IV Continuous <Continuous>  simethicone 80 milliGRAM(s) Chew every 8 hours PRN Dyspepsia  sodium chloride 0.9%. 1000 milliLiter(s) (5 mL/Hr) IV Continuous <Continuous>    =======================    ENDOCRINE  =====================  Stress hyperglycemia, continue glucose control with IV insulin drip     insulin regular Infusion 3 Unit(s)/Hr (3 mL/Hr) IV Continuous <Continuous>  octreotide  Injectable 50 MICROGram(s) IV Push every 8 hours    ========================INFECTIOUS DISEASE================  Temp 99.3F-> 100F, WBC downtrending 12.47->11.13  Monitor temperature and trend WBC   BCx+  +Serratia marcescens c/w cefepime and metronidazole. Per ID, will consider adding bactrim if clinical condition worsens.   C.diff + on , continue vanco     cefepime   IVPB 1000 milliGRAM(s) IV Intermittent every 8 hours  metroNIDAZOLE  IVPB 500 milliGRAM(s) IV Intermittent every 8 hours  vancomycin    Solution 125 milliGRAM(s) Oral every 12 hours    Patient requires continuous monitoring with bedside rhythm monitoring, pulse oximetry monitoring, and continuous central venous and arterial pressure monitoring; and intermittent blood gas analysis.  Care plan discussed with ICU care team.    Patient remained critical, at risk for life threatening decompensation.   I have spent 35 minutes providing acute care with multiple re-evaluations throughout the evening.     By signing my name below, I, Daniela Chowdary, attest that this documentation has been prepared under the direction and in the presence of CLAUDIA Mejia.  Electronically signed: Cesia Suresh, 21 @ 19:35    I, CLAUDIA Mejia, personally performed the services described in this documentation. All medical record entries made by the luisibmel were at my direction and in my presence. I have reviewed the chart and agree that the record reflects my personal performance and is accurate and complete  Electronically signed: CLAUDIA Mejia, 21 @ 19:35

## 2021-07-31 NOTE — PROGRESS NOTE ADULT - PROBLEM SELECTOR PLAN 4
- No active bleeding seen on past enteroscopy  - more recently, downtrending Hgb despite therapeutic INR   - s/p 2U PRBC 7/23 for Hgb 6.7 with appropriate response  - positive stool guaiac 7/23  - plan for endoscopy when stable   - Continue PPI  - transfuse PRN  - appreciate GI recs  - on octreotide 50 IV q8h

## 2021-07-31 NOTE — PROGRESS NOTE ADULT - ASSESSMENT
67 YO M with a history of stage D NICM s/p HM2 on 9/2017 as DT (due to severe PAD) with TV ring, prior COVID-19 infection 4/2020, recurrent syncope post LVAD s/p ILR, and chronic abdominal pain with prior negative workup who was admitted with symptomatic anemia with Hgb 4.5 in setting of INR 8.8 without hemodynamic instability. He was transfused and underwent VCE which showed active bleeding in the mid small bowel but subsequent enteroscopy 6/15 did not reveal any active bleeding. He acutely decompensated after procedure with fever/hypertension, low flow alarms, and pulmonary infiltrate with hypoxia requiring intubation from probable aspiration PNA. His LDH is normal and there are no signs of overt LVAD dysfunction on echo though signs of insufficiently unloaded ventricle for which his speed has been increased. Course notable for inability to wean ventilator with persistent secretions for which he underwent tracheostomy as well as acute c diff colitis. Worsening anemia over last few days requiring transfusion with low flow alarms, concerning for recurrent GI bleed. Now w/ hypotension and fevers c/f sepsis improved. Cxs pos for serratia s/p C tube w/ IR.   65 YO M with a history of stage D NICM s/p HM2 on 9/2017 as DT (due to severe PAD) with TV ring, prior COVID-19 infection 4/2020, recurrent syncope post LVAD s/p ILR, and chronic abdominal pain with prior negative workup who was admitted with symptomatic anemia with Hgb 4.5 in setting of INR 8.8 without hemodynamic instability. He was transfused and underwent VCE which showed active bleeding in the mid small bowel but subsequent enteroscopy 6/15 did not reveal any active bleeding. He acutely decompensated after procedure with fever/hypertension, low flow alarms, and pulmonary infiltrate with hypoxia requiring intubation from probable aspiration PNA. His LDH is normal and there are no signs of overt LVAD dysfunction on echo though signs of insufficiently unloaded ventricle for which his speed has been increased. Course notable for inability to wean ventilator with persistent secretions for which he underwent tracheostomy as well as acute c diff colitis. Worsening anemia over last few days requiring transfusion with low flow alarms, concerning for recurrent GI bleed. Now w/ hypotension and fevers c/f sepsis improved. Cxs pos for serratia s/p C tube w/ IR. Overall improving however given protracted hospitalization, will engage palliative care for GOC discussion with family.

## 2021-07-31 NOTE — PROGRESS NOTE ADULT - SUBJECTIVE AND OBJECTIVE BOX
RICKY HAMPTON  MRN-[id number]  Patient is a 66y old  Male who presents with a chief complaint of Anemia, Supratherapeutic INR, Dark Stools (2021 09:59)    HPI:  65M PMH ACC/AHA stage D HF due to NICM HM2 LVAD , TV annuloplasty ring 17 as destination therapy due to severe peripheral artery disease with significant stenosis  SIADH, Depression, CKD-3 with hyperkalemia, past E. coli UTIs, driveline drainage (21) and COVID-19 (back in 2020)  He was recently seen in clinic where he complained of abdominal pain and dark stools w constipation back in May. He presents to [facility] ER today weakness and fatigue, moderate and + Black stools for three days, on coumadin secondary to warfarin use in the setting of an LVAD. Patient has required transfusions for GIB in the past. Mostly recently back in 2021 pt had anemia with dark stools. No interventions was done at that time. However Last Endoscopy was done in 2020 (negative). Today labs show patient is anemic with H/H of 4.5/16.3,. INR is 8.84 MAP in the 90s, Temp 35.1. He denies any chest pain, shortness of breath, dizziness, abd pain, nausea or vomiting.       (2021 16:57)      Surgery/Hospital Course:   admit for melena w/ anemia, INR 8.84   6/15 Capsul study (+) for small bowel bleed, balloon endoscopy (old blood in prox ileum); post EGD - septic w/ L opacity, re-intubated for concern for aspiration, TTE (Mod MR, decrease biV w/ interventricular septum boweing towards R)   bronch    +C Diff    TC    CT C/A/P: Fluid filled colon which may be 2/2 rapid transit. Small bilateral pleffs with associates. Compressive atelectasis New ISABELLE & LLL  parenchymal opacities, suspicious for pneumonia. 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    #8 Shiley trach at bedside    CPAP trials    LVAD speed increased to 9200   Bronch; Central line dc'ed   TC since , continue as tolerated. Patient transferred to SDU.    Cont PT, no trach downsize today(#8cuffed)/ Anticoagulation/ Continue TF, speech f/u/ Vanco taper    oob to chair  INR  2.47  5 mg coumadin     increased lop to 25 q8  per HF   INR today 2.64.  H&H 7.3 this AM.  Will repeat CBC at noon, and will send stool guaiac Patient with persistent abdominal tenderness, rate of tube feeds decreased.  No nausea/vomiting.     INR today 2.4H&H 9.1.6 low flow overnight /N&V  NPO resolved for capsule study  Coumadin on hold refusing Tube feeds on D5 / normal  @50 cc/hr   INR 2.69  H&H 7..1 refusing Tube feeds on D5 1/2 normal  @50 cc/hr. This am + BM Anusha Oneill HF  aware- PRBC x1  GI team consulted -  NPO  plan on study in am-  D/w Dr Cadet Patient  to return to CTU for further management; 1PRBCS    Post op INR 2.2 today.  No bleeding. BC + for SM.  Pt is hypotensive requiring pressor and inotropic support.  ID follow up today on Cefepime will follow.   R PTC for PTX    CT C/A/P: sub q emphysema in R chest wall, GGO RUL, small ascites CTH negative; Abd US: GB thickening, pericholecystic fluid      Today:  OOBTC. Continue to ambulate as pt tolerates.    REVIEW OF SYSTEMS:  Gen: No fever  EYES/ENT: No visual changes;  No vertigo or throat pain   NECK: No pain   RES:  No shortness of breath or Cough  CARD: No chest pain   GI: No abdominal pain  : No dysuria  NEURO: No weakness  SKIN: No itching, rashes     ICU Vital Signs Last 24 Hrs  T(C): 37.8 (2021 11:00), Max: 38.2 (2021 20:00)  T(F): 100 (2021 11:00), Max: 100.8 (2021 20:00)  HR: 74 (2021 11:13) (60 - 86)  BP: --  BP(mean): --  ABP: 70/62 (2021 11:00) (70/62 - 111/86)  ABP(mean): 65 (2021 11:00) (65 - 95)  RR: 20 (2021 11:00) (20 - 23)  SpO2: 100% (2021 11:13) (80% - 100%)      Physical Exam:  Gen:  Awake, alert   CNS: non focal 	  Neck: no JVD, +TC  RES : clear , no wheezing              Chest: +chest tubes  CVS: Regular  rhythm. Normal S1/S2  Abd: Soft, non-distended. Bowel sounds present.  Skin: No rash.  Ext:  no edema    ============================I/O===========================   I&O's Detail    2021 07:01  -  2021 07:00  --------------------------------------------------------  IN:    Dexmedetomidine: 148.9 mL    Enteral Tube Flush: 120 mL    Insulin: 8 mL    IV PiggyBack: 625 mL    IV PiggyBack: 250 mL    Miscellaneous Tube Feedin mL    Pantoprazole: 240 mL    sodium chloride 0.9%: 205 mL  Total IN: 1956.9 mL    OUT:    Chest Tube (mL): 50 mL    Drain (mL): 130 mL    Indwelling Catheter - Urethral (mL): 1705 mL  Total OUT: 1885 mL    Total NET: 71.9 mL      2021 07:01  -  2021 12:21  --------------------------------------------------------  IN:    Dexmedetomidine: 11.7 mL    Insulin: 6 mL    Miscellaneous Tube Feedin mL    Pantoprazole: 40 mL    sodium chloride 0.9%: 20 mL  Total IN: 217.7 mL    OUT:    Chest Tube (mL): 20 mL    Drain (mL): 30 mL    Indwelling Catheter - Urethral (mL): 150 mL  Total OUT: 200 mL    Total NET: 17.7 mL        ============================ LABS =========================                        8.0    12.46 )-----------( 123      ( 2021 00:25 )             25.5         135  |  102  |  12  ----------------------------<  100<H>  3.6   |  23  |  0.58    Ca    8.6      2021 00:25  Phos  3.2       Mg     2.0         TPro  6.2  /  Alb  2.7<L>  /  TBili  1.2  /  DBili  x   /  AST  43<H>  /  ALT  131<H>  /  AlkPhos  194<H>      LIVER FUNCTIONS - ( 2021 00:25 )  Alb: 2.7 g/dL / Pro: 6.2 g/dL / ALK PHOS: 194 U/L / ALT: 131 U/L / AST: 43 U/L / GGT: x             ABG - ( 2021 00:06 )  pH, Arterial: 7.45  pH, Blood: x     /  pCO2: 38    /  pO2: 186   / HCO3: 26    / Base Excess: 2.2   /  SaO2: 100                 ======================Micro/Rad/Cardio=================  Culture: Reviewed   CXR: Reviewed  Echo:Reviewed  ======================================================  PAST MEDICAL & SURGICAL HISTORY:  CHF (congestive heart failure)    CAD (coronary artery disease)    Depression    Pleural effusion    History of 2019 novel coronavirus disease (COVID-19)  2020    Hemorrhoids    Bleeding hemorrhoids    Peripheral arterial disease    Claudication    BPH with urinary obstruction    ACC/AHA stage D systolic heart failure    Anticoagulation goal of INR 2.0 to 2.5    Falls    Clavicle fracture    CKD (chronic kidney disease), stage III    Iron deficiency anemia    H/O epistaxis    Vertigo    GI bleed    S/P TVR (tricuspid valve repair)    S/P ventricular assist device    S/P endoscopy      ====================ASSESSMENT ==============    Stage D Nonischemic Cardiomyopathy, Status Post HM2 on 2017    Cardiogenic shock  Hemodynamic instability   Acute hypoxemic respiratory failure  GI bleed , Status Post Enteroscopy   Anemia, in setting of melena   Thrombocytopenia   Leukocytosis   Chronic Kidney Disease  Stress hyperglycemia   C.diff positive on    Hypovolemic shock              Plan:  ====================== NEUROLOGY=====================  CTH on  with no acute intracranial hemorrhage or acute territorial infarction    Tylenol PRN for analgesia   C/w clonazepam and precedex for agitation  OOBTC. Continue to ambulate as pt tolerates.    acetaminophen    Suspension .. 650 milliGRAM(s) Oral every 6 hours PRN Mild Pain (1 - 3)  clonazePAM  Tablet 0.5 milliGRAM(s) Oral at bedtime  dexMEDEtomidine Infusion 0.5 MICROgram(s)/kG/Hr (9.81 mL/Hr) IV Continuous <Continuous>  ==================== RESPIRATORY======================  Acute hypoxemic respiratory failure s/p #8 shiley trach on ; CPAP/TC vent weaning as tolerated.   Requiring full vent support, continue close monitoring of respiratory rate and breathing pattern, following of ABG’s with A-line monitoring, continuous pulse oximetry monitoring.   R PTC placed on  for mod R PTX, continue to monitor output.   CT chest : Sub q emphysema in R chest wall, GGO RUL   ====================CARDIOVASCULAR==================  Stage D Nonischemic Cardiomyopathy, Status Post HM2 on 2017; LVAD settings 9200 with flow of 6.2  TTE : reveals at least moderate MR, mild AI, severe global LV syst dysfxn, dec RV fxn.  Continue invasive hemodynamic monitoring   Rate control with amiodarone     aMIOdarone    Tablet 200 milliGRAM(s) Oral daily  ===================HEMATOLOGIC/ONC ===================  Anemia due to acute blood loss associated with upper GI bleed   Thrombocytopenia, 123  Monitor H&H/Plts   ===================== RENAL =========================  Continue monitoring urine output, I&OS, BUN/Cr   Replete lytes PRN. Keep K> 4 and Mg >2   Continue diuresis prn for goal CVP 8-10.     ==================== GASTROINTESTINAL===================  Tolerating Jevity 1.2 shantelle TF, @ goal 60 cc/hr.   GI bleed in setting of recent melena, GI following - continue Protonix drip   As per GI, not stable for endoscopic evaluation, transfuse prn and f/u repeat CBC. May consider enteroscopy when stabilized.   CT A/P on : small ascites; US yesterday showing GB wall thickening w/ surrounding pericholecystic fluid  S/p cholecystostomy tube this AM with IR with -60cc of black bile, will monitor site closely.   C.diff + on , continue vanco   Simethicone PRN dyspepsia     pantoprazole Infusion 8 mG/Hr (10 mL/Hr) IV Continuous <Continuous>  simethicone 80 milliGRAM(s) Chew every 8 hours PRN Dyspepsia  sodium chloride 0.9%. 1000 milliLiter(s) (5 mL/Hr) IV Continuous <Continuous>  =======================    ENDOCRINE  =====================  Stress hyperglycemia, continue glucose control with IV insulin drip     insulin regular Infusion 3 Unit(s)/Hr (3 mL/Hr) IV Continuous <Continuous>  octreotide  Injectable 50 MICROGram(s) IV Push every 8 hours  ========================INFECTIOUS DISEASE================  Temp 100.8F , Luekocytosis with WBC currently 12.46  Monitor temperature and trend WBC   BCx+  +Serratia marcescens c/w cefepime and metronidazole. Per ID, will consider adding bactrim if clinical condition worsens.   C.diff + on , continue vanco     cefepime   IVPB 1000 milliGRAM(s) IV Intermittent every 8 hours  metroNIDAZOLE  IVPB 500 milliGRAM(s) IV Intermittent every 8 hours  vancomycin    Solution 125 milliGRAM(s) Oral every 12 hours      Patient requires continuous monitoring with bedside rhythm monitoring, pulse ox monitoring, and intermittent blood gas analysis. Care plan discussed with ICU care team. Patient remained critical and at risk for life threatening decompensation.     By signing my name below, I, Emily Roa, attest that this documentation has been prepared under the direction and in the presence of Lianne Knox (PA)  Electronically signed: Emily Roa Scribe, 21 @ 12:21    IAbilio Deborah (PA) , personally performed the services described in this documentation. all medical record entries made by the scribe were at my direction and in my presence. I have reviewed the chart and agree that the record reflects my personal performance and is accurate and complete  Electronically signed: Lianne Knox (PA)       RICKY HAMPTON  MRN-[id number]  Patient is a 66y old  Male who presents with a chief complaint of Anemia, Supratherapeutic INR, Dark Stools (2021 09:59)    HPI:  65M PMH ACC/AHA stage D HF due to NICM HM2 LVAD , TV annuloplasty ring 17 as destination therapy due to severe peripheral artery disease with significant stenosis  SIADH, Depression, CKD-3 with hyperkalemia, past E. coli UTIs, driveline drainage (21) and COVID-19 (back in 2020)  He was recently seen in clinic where he complained of abdominal pain and dark stools w constipation back in May. He presents to [facility] ER today weakness and fatigue, moderate and + Black stools for three days, on coumadin secondary to warfarin use in the setting of an LVAD. Patient has required transfusions for GIB in the past. Mostly recently back in 2021 pt had anemia with dark stools. No interventions was done at that time. However Last Endoscopy was done in 2020 (negative). Today labs show patient is anemic with H/H of 4.5/16.3,. INR is 8.84 MAP in the 90s, Temp 35.1. He denies any chest pain, shortness of breath, dizziness, abd pain, nausea or vomiting.       (2021 16:57)      Surgery/Hospital Course:   admit for melena w/ anemia, INR 8.84   6/15 Capsul study (+) for small bowel bleed, balloon endoscopy (old blood in prox ileum); post EGD - septic w/ L opacity, re-intubated for concern for aspiration, TTE (Mod MR, decrease biV w/ interventricular septum boweing towards R)   bronch    +C Diff    TC    CT C/A/P: Fluid filled colon which may be 2/2 rapid transit. Small bilateral pleffs with associates. Compressive atelectasis New ISABELLE & LLL  parenchymal opacities, suspicious for pneumonia. 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    #8 Shiley trach at bedside    CPAP trials    LVAD speed increased to 9200   Bronch; Central line dc'ed   TC since , continue as tolerated. Patient transferred to SDU.    Cont PT, no trach downsize today(#8cuffed)/ Anticoagulation/ Continue TF, speech f/u/ Vanco taper    oob to chair  INR  2.47  5 mg coumadin     increased lop to 25 q8  per HF   INR today 2.64.  H&H 7.3/24 this AM.  Will repeat CBC at noon, and will send stool guaiac Patient with persistent abdominal tenderness, rate of tube feeds decreased.  No nausea/vomiting.     INR today 2.4H&H 9.1/.6 low flow overnight /N&V  NPO resolved for capsule study  Coumadin on hold refusing Tube feeds on D5 1/ normal  @50 cc/hr   INR 2.69  H&H 7..1 refusing Tube feeds on D5 1/2 normal  @50 cc/hr. This am + BM Anusha Oneill HF  aware- PRBC x1  GI team consulted -  NPO  plan on study in am-  D/w Dr Cadet Patient  to return to CTU for further management; 1PRBCS    Post op INR 2.2 today.  No bleeding. BC + for SM.  Pt is hypotensive requiring pressor and inotropic support.  ID follow up today on Cefepime will follow.   R PTC for PTX    CT C/A/P: sub q emphysema in R chest wall, GGO RUL, small ascites CTH negative; Abd US: GB thickening, pericholecystic fluid     Perchole drain in place continues to drain total output overnight 133.  Fever today 38.8 given tylenol.  Will repeat BC now.      Today:  OOBTC. Continue to ambulate as pt tolerates.    REVIEW OF SYSTEMS:  Gen: No fever  EYES/ENT: No visual changes;  No vertigo or throat pain   NECK: No pain   RES:  No shortness of breath or Cough  CARD: No chest pain   GI: No abdominal pain  : No dysuria  NEURO: No weakness  SKIN: No itching, rashes     ICU Vital Signs Last 24 Hrs  T(C): 37.8 (2021 11:00), Max: 38.2 (2021 20:00)  T(F): 100 (2021 11:00), Max: 100.8 (2021 20:00)  HR: 74 (2021 11:13) (60 - 86)  BP: --  BP(mean): --  ABP: 70/62 (2021 11:00) (70/62 - 111/86)  ABP(mean): 65 (2021 11:00) (65 - 95)  RR: 20 (2021 11:00) (20 - 23)  SpO2: 100% (2021 11:13) (80% - 100%)      Physical Exam:  Gen:  Awake, alert   CNS: non focal 	  Neck: no JVD, +TC  RES : clear , no wheezing              Chest: +chest tubes  CVS: Regular  rhythm. Normal S1/S2  Abd: Soft, non-distended. Bowel sounds present.  Skin: No rash.  Ext:  no edema    ============================I/O===========================   I&O's Detail    2021 07:  -  2021 07:00  --------------------------------------------------------  IN:    Dexmedetomidine: 148.9 mL    Enteral Tube Flush: 120 mL    Insulin: 8 mL    IV PiggyBack: 625 mL    IV PiggyBack: 250 mL    Miscellaneous Tube Feedin mL    Pantoprazole: 240 mL    sodium chloride 0.9%: 205 mL  Total IN: 1956.9 mL    OUT:    Chest Tube (mL): 50 mL    Drain (mL): 130 mL    Indwelling Catheter - Urethral (mL): 1705 mL  Total OUT: 1885 mL    Total NET: 71.9 mL      2021 07:01  -  2021 12:21  --------------------------------------------------------  IN:    Dexmedetomidine: 11.7 mL    Insulin: 6 mL    Miscellaneous Tube Feedin mL    Pantoprazole: 40 mL    sodium chloride 0.9%: 20 mL  Total IN: 217.7 mL    OUT:    Chest Tube (mL): 20 mL    Drain (mL): 30 mL    Indwelling Catheter - Urethral (mL): 150 mL  Total OUT: 200 mL    Total NET: 17.7 mL        ============================ LABS =========================                        8.0    12.46 )-----------( 123      ( 2021 00:25 )             25.5         135  |  102  |  12  ----------------------------<  100<H>  3.6   |  23  |  0.58    Ca    8.6      2021 00:25  Phos  3.2       Mg     2.0         TPro  6.2  /  Alb  2.7<L>  /  TBili  1.2  /  DBili  x   /  AST  43<H>  /  ALT  131<H>  /  AlkPhos  194<H>      LIVER FUNCTIONS - ( 2021 00:25 )  Alb: 2.7 g/dL / Pro: 6.2 g/dL / ALK PHOS: 194 U/L / ALT: 131 U/L / AST: 43 U/L / GGT: x             ABG - ( 2021 00:06 )  pH, Arterial: 7.45  pH, Blood: x     /  pCO2: 38    /  pO2: 186   / HCO3: 26    / Base Excess: 2.2   /  SaO2: 100                 ======================Micro/Rad/Cardio=================  Culture: Reviewed   CXR: Reviewed  Echo:Reviewed  ======================================================  PAST MEDICAL & SURGICAL HISTORY:  CHF (congestive heart failure)    CAD (coronary artery disease)    Depression    Pleural effusion    History of 2019 novel coronavirus disease (COVID-19)  2020    Hemorrhoids    Bleeding hemorrhoids    Peripheral arterial disease    Claudication    BPH with urinary obstruction    ACC/AHA stage D systolic heart failure    Anticoagulation goal of INR 2.0 to 2.5    Falls    Clavicle fracture    CKD (chronic kidney disease), stage III    Iron deficiency anemia    H/O epistaxis    Vertigo    GI bleed    S/P TVR (tricuspid valve repair)    S/P ventricular assist device    S/P endoscopy      ====================ASSESSMENT ==============    Stage D Nonischemic Cardiomyopathy, Status Post HM2 on 2017    Cardiogenic shock  Hemodynamic instability   Acute hypoxemic respiratory failure  GI bleed , Status Post Enteroscopy   Anemia, in setting of melena   Thrombocytopenia   Leukocytosis   Chronic Kidney Disease  Stress hyperglycemia   C.diff positive on    Hypovolemic shock              Plan:  ====================== NEUROLOGY=====================  CTH on  with no acute intracranial hemorrhage or acute territorial infarction    Tylenol PRN for analgesia   C/w clonazepam and precedex for agitation  OOBTC. Continue to ambulate as pt tolerates.    acetaminophen    Suspension .. 650 milliGRAM(s) Oral every 6 hours PRN Mild Pain (1 - 3)  clonazePAM  Tablet 0.5 milliGRAM(s) Oral at bedtime  dexMEDEtomidine Infusion 0.5 MICROgram(s)/kG/Hr (9.81 mL/Hr) IV Continuous <Continuous>  ==================== RESPIRATORY======================  Acute hypoxemic respiratory failure s/p #8 shiley trach on ; CPAP/TC vent weaning as tolerated.   Requiring full vent support, continue close monitoring of respiratory rate and breathing pattern, following of ABG’s with A-line monitoring, continuous pulse oximetry monitoring.   R PTC placed on  for mod R PTX, continue to monitor output.   CT chest : Sub q emphysema in R chest wall, GGO RUL   ====================CARDIOVASCULAR==================  Stage D Nonischemic Cardiomyopathy, Status Post HM2 on 2017; LVAD settings 9200 with flow of 6.2  TTE : reveals at least moderate MR, mild AI, severe global LV syst dysfxn, dec RV fxn.  Continue invasive hemodynamic monitoring   Rate control with amiodarone     aMIOdarone    Tablet 200 milliGRAM(s) Oral daily  ===================HEMATOLOGIC/ONC ===================  Anemia due to acute blood loss associated with upper GI bleed   Thrombocytopenia, 123  Monitor H&H/Plts   ===================== RENAL =========================  Continue monitoring urine output, I&OS, BUN/Cr   Replete lytes PRN. Keep K> 4 and Mg >2   Continue diuresis prn for goal CVP 8-10.     ==================== GASTROINTESTINAL===================  Tolerating Jevity 1.2 shantelle TF, @ goal 60 cc/hr.   GI bleed in setting of recent melena, GI following - continue Protonix drip   As per GI, not stable for endoscopic evaluation, transfuse prn and f/u repeat CBC. May consider enteroscopy when stabilized.   CT A/P on : small ascites; US yesterday showing GB wall thickening w/ surrounding pericholecystic fluid  S/p cholecystostomy tube this AM with IR with -60cc of black bile, will monitor site closely.   C.diff + on , continue vanco   Simethicone PRN dyspepsia     pantoprazole Infusion 8 mG/Hr (10 mL/Hr) IV Continuous <Continuous>  simethicone 80 milliGRAM(s) Chew every 8 hours PRN Dyspepsia  sodium chloride 0.9%. 1000 milliLiter(s) (5 mL/Hr) IV Continuous <Continuous>  =======================    ENDOCRINE  =====================  Stress hyperglycemia, continue glucose control with IV insulin drip     insulin regular Infusion 3 Unit(s)/Hr (3 mL/Hr) IV Continuous <Continuous>  octreotide  Injectable 50 MICROGram(s) IV Push every 8 hours  ========================INFECTIOUS DISEASE================  Temp 100.8F , Luekocytosis with WBC currently 12.46  Monitor temperature and trend WBC   BCx+  +Serratia marcescens c/w cefepime and metronidazole. Per ID, will consider adding bactrim if clinical condition worsens.   C.diff + on , continue vanco     cefepime   IVPB 1000 milliGRAM(s) IV Intermittent every 8 hours  metroNIDAZOLE  IVPB 500 milliGRAM(s) IV Intermittent every 8 hours  vancomycin    Solution 125 milliGRAM(s) Oral every 12 hours      Patient requires continuous monitoring with bedside rhythm monitoring, pulse ox monitoring, and intermittent blood gas analysis. Care plan discussed with ICU care team. Patient remained critical and at risk for life threatening decompensation.     By signing my name below, IJanina Tiffany, attest that this documentation has been prepared under the direction and in the presence of Lianne Knox)  Electronically signed: Emily Roa Scribe, 21 @ 12:21    I, Lianne Knox (PA) , personally performed the services described in this documentation. all medical record entries made by the romel were at my direction and in my presence. I have reviewed the chart and agree that the record reflects my personal performance and is accurate and complete  Electronically signed: Lianne Knox)

## 2021-07-31 NOTE — PROGRESS NOTE ADULT - ASSESSMENT
Assessment and Recommendation:   · Assessment	  Assessment and recommendation :  Recurrent Acute hypoxic respiratory Failure S/P tracheostomy on full ventilatory  at 40% Fi02, /20/5 PEEP   Acute blood loss anemia S/P multiple  blood transfusion   septic shock off vasopressin , levophed and off  Dobutamine   sepsis with serratia marcescens in the blood on cefepime  S/P cholecystostomy tube placement by IR    RT pneumothorax Pig tail cathter in place   S/P Acute left lower lobe pneumonia   Aspiration pneumonia   Stool is  +ve for C-diff. colitis on PO vancomycin  improved  Non ischemic cardiomyopathy continue ACE inhibitor and B-Blockers   S/P Septic shock and cardiogenic shock   Stage D systolic heart failure S/P LVAD HM2   MH2 LVAD  with  TV Annuloplasty  Severe peripheral vascular disease   severe hyperglycemia on insulin coverage    cardiac arrythmia  on  Amiodarone   critical care polyneuropathy   Anemia of Acute blood Loss   S/P Small bowel bleeding   S/P blood and FFP transfusion   Chronic kidney disease stage III   NGT feeding on Jevity   PT and OT at bed side   GI prophylaxis with PPI   critical care time is 35 minutes

## 2021-07-31 NOTE — PROGRESS NOTE ADULT - PROBLEM SELECTOR PLAN 3
- on cefepmie/flagyl  - off pressors   - keep slight negative in s/o elevated CVP  - s/p C tube w/ IR

## 2021-07-31 NOTE — PROGRESS NOTE ADULT - SUBJECTIVE AND OBJECTIVE BOX
RICKY JOINT  MRN#: [id number]  Subjective:  Pulmonary progress  : recurrent Acute hypoxic respiratory Failure ,aspiration pneumonia, NICM  , chart reviewed and H/O obtained radiological and Laboratory study reviewed patient Examined     65M PMH ACC/AHA stage D HF due to NICM HM2 LVAD , TV annuloplasty ring 17 as destination therapy due to severe peripheral artery disease with significant stenosis  SIADH, Depression, CKD-3 with hyperkalemia, past E. coli UTIs, driveline drainage (21) and COVID-19 (back in 2020)  He was recently seen in clinic where he complained of abdominal pain and dark stools w constipation back in May. He presents to [facility] ER today weakness and fatigue, moderate and + Black stools for three days, on coumadin secondary to warfarin use in the setting of an LVAD. Patient has required transfusions for GIB in the past. Mostly recently back in 2021 pt had anemia with dark stools. No interventions was done at that time. However Last Endoscopy was done in 2020 (negative). Today labs show patient is anemic with H/H of 4.5/16.3,. INR is 8.84 MAP in the 90s, Temp 35.1. He denies any chest pain, shortness of breath, dizziness, abd pain, nausea or vomiting. found to have  rectal bleeding underwent endoscopy ,old blood in the proximal ileum ,  develop sepsis with LL opacity given Antibiotics , Extubated , reintubated , Bronchoscopy on Zosyn for LL pneumonia  and Amiodarone S/P TV Annuloplasty , patient remain intubated on full ventilatory support .S/P multiple units of blood transfusion , remain on full ventilatory support on Precedex and propofol , new central IJ line , diarrhea C diff. +ve on po vancomycin and IV Flagyl,  mildly distended belly , fever start on cefepime 2gm q 8 hrs S/P tracheostomy .  new RT Subclavian central line continue on contact  isolation ,still diarrhea on C-diff antibiotics ENT follow up appreciated , trial of C-PAP as tolerated , , copious secretion from trach. site chest x ray left lower lobe pneumonia , tolerating trch. collar 50% FI02 still excessive secretion need pulmonary toilet , off Ancef antibiotic , no more diarrhea back on full support mechanical ventilator , chest x ray show improvement in LLL air space disease, more awake and responsive on tube feeding no more diarrhea , S/P  Ancef for bacteremia no fever ,ID follow up noted ,  no nausea or vomiting or diarrhea still very weak and tired , event note pulled NG tube now replaced , back on tube feeding ,still on po vancomycin , getting PT and OT at bed side , no plan for decannulation for now. , no more diarrhea receiving PT and OPT at bed side , minimal secretion from tracheostomy site , no SOB getting stronger , improve muscle tone patient transfer to monitor bed still on contact isolation for C-Difficel colitis on 50% FI02, NG tube clogged , and change to resume tube feeding still loose stool . H/H drop significantly require blood transfusion , most likely GI bleeding , IV heparin D/C , vomiting 200 cc of creamy color tube feeding on hold no sob, still melena monitor in the CTU possible capsule endoscopy , H/H is stable ., patient develop TR sided  pneumothorax require chest tube placement , RT IJ central line  placed , develop fever shaking chills , blood culture positive for serratia marcescens , start on cefepime .the patient  become hypoxic and hypotensive placed on full ventilatory support and Vasopressin , levophed and Dobutamine ,S/P blood transfusion on meropenem and vancomycin , IR note appreciated   , on and  off pressors , occasional agitation on Precedex .S/P IR cholecystostomy tube drainage placement in the RT upper Quadrant , resume anticoagulation          (2021 16:57)    PAST MEDICAL & SURGICAL HISTORY:  CHF (congestive heart failure)    CAD (coronary artery disease)  Depression    Pleural effusion    History of 2019 novel coronavirus disease (COVID-19)  2020    Hemorrhoids    Bleeding hemorrhoids    Peripheral arterial disease    Claudication    BPH with urinary obstruction    ACC/AHA stage D systolic heart failure  Anticoagulation goal of INR 2.0 to 2.5    Falls    Clavicle fracture    CKD (chronic kidney disease), stage III    Iron deficiency anemia    H/O epistaxis    Vertigo    GI bleed    S/P TVR (tricuspid valve repair)    S/P ventricular assist device    S/P endoscopy    OBJECTIVE:  ICU Vital Signs Last 24 Hrs  T(C): 38.2 (2021 10:00), Max: 38.5 (2021 12:00)  T(F): 100.8 (2021 10:00), Max: 101.3 (2021 12:00)  HR: 65 (2021 10:00) (61 - 69)  BP: --  BP(mean): --  ABP: 105/67 (2021 10:00) (90/54 - 113/64)  ABP(mean): 77 (2021 10:00) (63 - 77)  RR: 20 (2021 10:00) (19 - 35)  SpO2: 99% (2021 10:00) (96% - 100%)       @ 07:  -   @ 07:00  --------------------------------------------------------  IN: 2693.9 mL / OUT: 1415 mL / NET: 1278.9 mL     @ 07:01  -   @ 10:49  --------------------------------------------------------  IN: 420.8 mL / OUT: 115 mL / NET: 305.8 mL  PHYSICAL EXAM:Daily   Elderly male S/P tracheostomy   on  full ventilatory support on  40% FI02  RR/20/550 VT lethargic on Precedex , off pressor    Daily Weight in k.4 (2021 00:00)  HEENT:     + NCAT  + EOMI  - Conjuctival edema   - Icterus   - Thrush   - ETT  + NGT/OGT  Neck:         + FROM   RT IJ line  JVD     - Nodes     - Masses    + Mid-line trachea + Tracheostomy  Chest:            RT pig tail cathter in place   Lungs:          + CTA   + Rhonchi    - Rales    - Wheezing + Decreased  LT BS   - Dullness R L  Cardiac:       + S1 + S2    + RRR   - Irregular   - S3  - S4    - Murmurs   - Rub   - Hamman’s sign   Abdomen:    + BS  + Soft + Non-tender - Distended - Organomegaly - PEG .cholecystostomy tube in place  Extremities:   - Cyanosis U/L   - Clubbing  U/L  + LE/UE Edema   + Capillary refill    + Pulses   Neuro:        - Awake   -  Alert   - Confused   - Lethargic   + Sedated  + Generalized Weakness  Skin:        - Rashes    - Erythema   + Normal incisions   + IV sites intact          HOSPITAL MEDICATIONS: All medications reviewed and analyzed  MEDICATIONS  (STANDING):  amiodarone    Tablet 200 milliGRAM(s) Oral daily  chlorhexidine 0.12% Liquid 15 milliLiter(s) Oral Mucosa every 12 hours  chlorhexidine 2% Cloths 1 Application(s) Topical <User Schedule>  dexmedetomidine Infusion 0.5 MICROgram(s)/kG/Hr (9.81 mL/Hr) IV Continuous <Continuous>  dextrose 50% Injectable 50 milliLiter(s) IV Push every 15 minutes  heparin  Infusion 400 Unit(s)/Hr (12.5 mL/Hr) IV Continuous <Continuous>  Hydromorphone  Injectable 0.5 milliGRAM(s) IV Push once  insulin lispro (ADMELOG) corrective regimen sliding scale   SubCutaneous every 6 hours  pantoprazole  Injectable 40 milliGRAM(s) IV Push every 12 hours  piperacillin/tazobactam IVPB.. 3.375 Gram(s) IV Intermittent every 8 hours  propofol Infusion 20 MICROgram(s)/kG/Min (9.42 mL/Hr) IV Continuous <Continuous>  sodium chloride 0.9% lock flush 3 milliLiter(s) IV Push every 8 hours  sodium chloride 0.9%. 1000 milliLiter(s) (10 mL/Hr) IV Continuous <Continuous>    MEDICATIONS  (PRN):  acetaminophen    Suspension .. 650 milliGRAM(s) Oral every 6 hours PRN Temp greater or equal to 38C (100.4F)    LABS: All Lab data reviewed and analyzed                         8.0    12.46 )-----------( 123      ( 2021 00:25 )             25.5       135  |  102  |  12  ----------------------------<  100<H>  3.6   |  23  |  0.58    Ca    8.6      2021 00:25  Phos  3.2       Mg     2.0         TPro  6.2  /  Alb  2.7<L>  /  TBili  1.2  /  DBili  x   /  AST  43<H>  /  ALT  131<H>  /  AlkPhos  194<H>      Ca    9.1      2021 00:58  Phos  1.5     -  Mg     1.9         TPro  6.4  /  Alb  3.0<L>  /  TBili  1.3<H>  /  DBili  x   /  AST  117<H>  /  ALT  210<H>  /  AlkPhos  224<H>                 24.1   Ca    8.4      2021 00:28  Phos  0.9     -  Mg     2.2         TPro  6.0  /  Alb  2.9<L>  /  TBili  0.9  /  DBili  x   /  AST  209<H>  /  ALT  237<H>  /  AlkPhos  171<H>        Ca    8.6      2021 00:32  Phos  1.9     -  Mg     1.8         TPro  6.2  /  Alb  3.1<L>  /  TBili  1.7<H>  /  DBili  x   /  AST  195<H>  /  ALT  157<H>  /  AlkPhos  131<H>                                                              PTT - ( 2021 04:52 )  PTT:45.2 sec LIVER FUNCTIONS - ( 2021 00:42 )  Alb: 3.4 g/dL / Pro: 6.7 g/dL / ALK PHOS: 213 U/L / ALT: 15 U/L / AST: 24 U/L / GGT: x           RADIOLOGY: - Reviewed and analyzed RT Pig tail cathter  , LVAD HM2, CT scan of abdomen reviewed result noted

## 2021-07-31 NOTE — PROGRESS NOTE ADULT - PROBLEM SELECTOR PLAN 1
- holding metoprolol in setting of sepsis   - CVP slightly elevated; goal slight negative   - Continue home amiodarone 200 mg PO daily  - currently doing well off dobutamine   - CVL in place, trend central sats

## 2021-07-31 NOTE — PROGRESS NOTE ADULT - PROBLEM SELECTOR PLAN 2
- Current speed 9200 RPM  - HOLD coumadin given recurrent bleed  - Will plan to hold aspirin indefinitely given recurrent GIB.  - Continue to monitor LDH daily

## 2021-07-31 NOTE — PROGRESS NOTE ADULT - SUBJECTIVE AND OBJECTIVE BOX
Interval History:  no overnight events  defervesced    Medications:  acetaminophen    Suspension .. 650 milliGRAM(s) Oral every 6 hours PRN  aMIOdarone    Tablet 200 milliGRAM(s) Oral daily  cefepime   IVPB 1000 milliGRAM(s) IV Intermittent every 8 hours  chlorhexidine 0.12% Liquid 15 milliLiter(s) Oral Mucosa every 12 hours  chlorhexidine 2% Cloths 1 Application(s) Topical <User Schedule>  clonazePAM  Tablet 0.5 milliGRAM(s) Oral at bedtime  dexMEDEtomidine Infusion 0.5 MICROgram(s)/kG/Hr IV Continuous <Continuous>  insulin regular Infusion 3 Unit(s)/Hr IV Continuous <Continuous>  metroNIDAZOLE  IVPB 500 milliGRAM(s) IV Intermittent every 8 hours  octreotide  Injectable 50 MICROGram(s) IV Push every 8 hours  pantoprazole Infusion 8 mG/Hr IV Continuous <Continuous>  simethicone 80 milliGRAM(s) Chew every 8 hours PRN  sodium chloride 0.9%. 1000 milliLiter(s) IV Continuous <Continuous>  vancomycin    Solution 125 milliGRAM(s) Oral every 12 hours      Vitals:  T(C): 37.7 (21 @ 16:00), Max: 38.2 (21 @ 20:00)  HR: 74 (21 @ 16:00) (60 - 92)  BP: --  BP(mean): --  ABP: 90/76 (21 @ 16:00) (70/62 - 93/74)  ABP(mean): 82 (21 @ 16:00) (65 - 82)  RR: 20 (21 @ 16:00) (20 - 30)  SpO2: 100% (21 @ 16:00) (80% - 100%)    Daily     Daily Weight in k.1 (2021 00:00)    Weight (kg): 78.5 ( @ 07:47)    I&O's Summary    2021 07:01  -  2021 07:00  --------------------------------------------------------  IN: 1956.9 mL / OUT: 1885 mL / NET: 71.9 mL    2021 07:01  -  2021 16:11  --------------------------------------------------------  IN: 522.2 mL / OUT: 520 mL / NET: 2.2 mL    Physical Exam:  Appearance: No Acute Distress  HEENT: PERRL  Neck: JVD approx 10 cm  Cardiovascular: Normal S1 S2, No murmurs/rubs/gallops; LVAD hum  Respiratory: Clear to auscultation bilaterally  Gastrointestinal: Soft, Non-tender	  Skin: No cyanosis	  Neurologic: Non-focal  Extremities: No LE edema  Psychiatry: unable to assess    LVAD Interrogation:  Pump Speed: 9200  Pump Flow: 5.6  Pulse Index: 4.9  Pump Power: 4.8  VAD Events: no events  Driveline evaluation:  c/d/i   Programming Changes: No changes made    Labs:                        8.0    12.46 )-----------( 123      ( 2021 00:25 )             25.5         135  |  102  |  12  ----------------------------<  100<H>  3.6   |  23  |  0.58    Ca    8.6      2021 00:25  Phos  3.2       Mg     2.0         TPro  6.2  /  Alb  2.7<L>  /  TBili  1.2  /  DBili  x   /  AST  43<H>  /  ALT  131<H>  /  AlkPhos  194<H>

## 2021-08-01 NOTE — PROGRESS NOTE ADULT - SUBJECTIVE AND OBJECTIVE BOX
RICKY JOINT  MRN#: [id number]  Subjective:  Pulmonary progress  : recurrent Acute hypoxic respiratory Failure ,aspiration pneumonia, NICM  , chart reviewed and H/O obtained radiological and Laboratory study reviewed patient Examined     65M PMH ACC/AHA stage D HF due to NICM HM2 LVAD , TV annuloplasty ring 17 as destination therapy due to severe peripheral artery disease with significant stenosis  SIADH, Depression, CKD-3 with hyperkalemia, past E. coli UTIs, driveline drainage (21) and COVID-19 (back in 2020)  He was recently seen in clinic where he complained of abdominal pain and dark stools w constipation back in May. He presents to [facility] ER today weakness and fatigue, moderate and + Black stools for three days, on coumadin secondary to warfarin use in the setting of an LVAD. Patient has required transfusions for GIB in the past. Mostly recently back in 2021 pt had anemia with dark stools. No interventions was done at that time. However Last Endoscopy was done in 2020 (negative). Today labs show patient is anemic with H/H of 4.5/16.3,. INR is 8.84 MAP in the 90s, Temp 35.1. He denies any chest pain, shortness of breath, dizziness, abd pain, nausea or vomiting. found to have  rectal bleeding underwent endoscopy ,old blood in the proximal ileum ,  develop sepsis with LL opacity given Antibiotics , Extubated , reintubated , Bronchoscopy on Zosyn for LL pneumonia  and Amiodarone S/P TV Annuloplasty , patient remain intubated on full ventilatory support .S/P multiple units of blood transfusion , remain on full ventilatory support on Precedex and propofol , new central IJ line , diarrhea C diff. +ve on po vancomycin and IV Flagyl,  mildly distended belly , fever start on cefepime 2gm q 8 hrs S/P tracheostomy .  new RT Subclavian central line continue on contact  isolation ,still diarrhea on C-diff antibiotics ENT follow up appreciated , trial of C-PAP as tolerated , , copious secretion from trach. site chest x ray left lower lobe pneumonia , tolerating trch. collar 50% FI02 still excessive secretion need pulmonary toilet , off Ancef antibiotic , no more diarrhea back on full support mechanical ventilator , chest x ray show improvement in LLL air space disease, more awake and responsive on tube feeding no more diarrhea , S/P  Ancef for bacteremia no fever ,ID follow up noted ,  no nausea or vomiting or diarrhea still very weak and tired , event note pulled NG tube now replaced , back on tube feeding ,still on po vancomycin , getting PT and OT at bed side , no plan for decannulation for now. , no more diarrhea receiving PT and OPT at bed side , minimal secretion from tracheostomy site , no SOB getting stronger , improve muscle tone patient transfer to monitor bed still on contact isolation for C-Difficel colitis on 50% FI02, NG tube clogged , and change to resume tube feeding still loose stool . H/H drop significantly require blood transfusion , most likely GI bleeding , IV heparin D/C , vomiting 200 cc of creamy color tube feeding on hold no sob, still melena monitor in the CTU possible capsule endoscopy , H/H is stable ., patient develop TR sided  pneumothorax require chest tube placement , RT IJ central line  placed , develop fever shaking chills , blood culture positive for serratia marcescens , start on cefepime .the patient  become hypoxic and hypotensive placed on full ventilatory support and Vasopressin , levophed and Dobutamine ,S/P blood transfusion on meropenem and vancomycin , IR note appreciated   , on and  off pressors , occasional agitation on Precedex .S/P IR cholecystostomy tube drainage placement in the RT upper Quadrant , resume anticoagulation chest x ray noted C-PAP trail          (2021 16:57)    PAST MEDICAL & SURGICAL HISTORY:  CHF (congestive heart failure)    CAD (coronary artery disease)  Depression    Pleural effusion    History of 2019 novel coronavirus disease (COVID-19)  2020    Hemorrhoids    Bleeding hemorrhoids    Peripheral arterial disease    Claudication    BPH with urinary obstruction    ACC/AHA stage D systolic heart failure  Anticoagulation goal of INR 2.0 to 2.5    Falls    Clavicle fracture    CKD (chronic kidney disease), stage III    Iron deficiency anemia    H/O epistaxis    Vertigo    GI bleed    S/P TVR (tricuspid valve repair)    S/P ventricular assist device    S/P endoscopy    OBJECTIVE:  ICU Vital Signs Last 24 Hrs  T(C): 38.2 (2021 10:00), Max: 38.5 (2021 12:00)  T(F): 100.8 (2021 10:00), Max: 101.3 (2021 12:00)  HR: 65 (2021 10:00) (61 - 69)  BP: --  BP(mean): --  ABP: 105/67 (2021 10:00) (90/54 - 113/64)  ABP(mean): 77 (2021 10:00) (63 - 77)  RR: 20 (2021 10:00) (19 - 35)  SpO2: 99% (2021 10:00) (96% - 100%)       @ 07:  -   @ 07:00  --------------------------------------------------------  IN: 2693.9 mL / OUT: 1415 mL / NET: 1278.9 mL     @ 07:01  -   @ 10:49  --------------------------------------------------------  IN: 420.8 mL / OUT: 115 mL / NET: 305.8 mL  PHYSICAL EXAM:Daily   Elderly male S/P tracheostomy   on  full ventilatory support on  40% FI02  RR/20/550 VT lethargic on Precedex , off pressor    Daily Weight in k.4 (2021 00:00)  HEENT:     + NCAT  + EOMI  - Conjuctival edema   - Icterus   - Thrush   - ETT  + NGT/OGT  Neck:         + FROM   RT IJ line RT SCl line JVD  - Nodes - Masses + Mid-line trachea + Tracheostomy  Chest:            RT pig tail cathter in place   Lungs:          + CTA   + Rhonchi    - Rales    - Wheezing + Decreased  LT BS   - Dullness R L  Cardiac:       + S1 + S2    + RRR   - Irregular   - S3  - S4    - Murmurs   - Rub   - Hamman’s sign   Abdomen:    + BS  + Soft + Non-tender - Distended - Organomegaly - PEG .cholecystostomy tube in place  Extremities:   - Cyanosis U/L   - Clubbing  U/L  + LE/UE Edema   + Capillary refill    + Pulses   Neuro:        - Awake   -  Alert   - Confused   - Lethargic   + Sedated  + Generalized Weakness  Skin:        - Rashes    - Erythema   + Normal incisions   + IV sites intact          HOSPITAL MEDICATIONS: All medications reviewed and analyzed  MEDICATIONS  (STANDING):  amiodarone    Tablet 200 milliGRAM(s) Oral daily  chlorhexidine 0.12% Liquid 15 milliLiter(s) Oral Mucosa every 12 hours  chlorhexidine 2% Cloths 1 Application(s) Topical <User Schedule>  dexmedetomidine Infusion 0.5 MICROgram(s)/kG/Hr (9.81 mL/Hr) IV Continuous <Continuous>  dextrose 50% Injectable 50 milliLiter(s) IV Push every 15 minutes  heparin  Infusion 400 Unit(s)/Hr (12.5 mL/Hr) IV Continuous <Continuous>  Hydromorphone  Injectable 0.5 milliGRAM(s) IV Push once  insulin lispro (ADMELOG) corrective regimen sliding scale   SubCutaneous every 6 hours  pantoprazole  Injectable 40 milliGRAM(s) IV Push every 12 hours  piperacillin/tazobactam IVPB.. 3.375 Gram(s) IV Intermittent every 8 hours  propofol Infusion 20 MICROgram(s)/kG/Min (9.42 mL/Hr) IV Continuous <Continuous>  sodium chloride 0.9% lock flush 3 milliLiter(s) IV Push every 8 hours  sodium chloride 0.9%. 1000 milliLiter(s) (10 mL/Hr) IV Continuous <Continuous>    MEDICATIONS  (PRN):  acetaminophen    Suspension .. 650 milliGRAM(s) Oral every 6 hours PRN Temp greater or equal to 38C (100.4F)    LABS: All Lab data reviewed and analyzed                        8.1    13.03 )-----------( 153      ( 01 Aug 2021 00:32 )             26.5       138  |  106  |  12  ----------------------------<  138<H>  4.0   |  24  |  0.51    Ca    8.8      01 Aug 2021 00:32  Phos  2.9       Mg     1.8         TPro  6.6  /  Alb  3.0<L>  /  TBili  0.7  /  DBili  x   /  AST  21  /  ALT  93<H>  /  AlkPhos  181<H>      Ca    8.6      2021 00:25  Phos  3.2       Mg     2.0         TPro  6.2  /  Alb  2.7<L>  /  TBili  1.2  /  DBili  x   /  AST  43<H>  /  ALT  131<H>  /  AlkPhos  194<H>      Ca    9.1      2021 00:58  Phos  1.5       Mg     1.9         TPro  6.4  /  Alb  3.0<L>  /  TBili  1.3<H>  /  DBili  x   /  AST  117<H>  /  ALT  210<H>  /  AlkPhos  224<H>                 24.1   Ca    8.4      2021 00:28  Phos  0.9       Mg     2.2         TPro  6.0  /  Alb  2.9<L>  /  TBili  0.9  /  DBili  x   /  AST  209<H>  /  ALT  237<H>  /  AlkPhos  171<H>        Ca    8.6      2021 00:32  Phos  1.9       Mg     1.8         TPro  6.2  /  Alb  3.1<L>  /  TBili  1.7<H>  /  DBili  x   /  AST  195<H>  /  ALT  157<H>  /  AlkPhos  131<H>                                                              PTT - ( 2021 04:52 )  PTT:45.2 sec LIVER FUNCTIONS - ( 2021 00:42 )  Alb: 3.4 g/dL / Pro: 6.7 g/dL / ALK PHOS: 213 U/L / ALT: 15 U/L / AST: 24 U/L / GGT: x           RADIOLOGY: - Reviewed and analyzed RT Pig tail cathter  , LVAD HM2, CT scan of abdomen reviewed result noted

## 2021-08-01 NOTE — PROGRESS NOTE ADULT - SUBJECTIVE AND OBJECTIVE BOX
Patient discussed on morning rounds with         · Subjective and Objective:   RICKY HAMPTON  MRN-[id number]  Patient is a 66y old  Male who presents with a chief complaint of Anemia, Supratherapeutic INR, Dark Stools (2021 09:59)    HPI:  65M PMH ACC/AHA stage D HF due to NICM HM2 LVAD , TV annuloplasty ring 17 as destination therapy due to severe peripheral artery disease with significant stenosis  SIADH, Depression, CKD-3 with hyperkalemia, past E. coli UTIs, driveline drainage (21) and COVID-19 (back in 2020)  He was recently seen in clinic where he complained of abdominal pain and dark stools w constipation back in May. He presents to [facility] ER today weakness and fatigue, moderate and + Black stools for three days, on coumadin secondary to warfarin use in the setting of an LVAD. Patient has required transfusions for GIB in the past. Mostly recently back in 2021 pt had anemia with dark stools. No interventions was done at that time. However Last Endoscopy was done in 2020 (negative). Today labs show patient is anemic with H/H of 4.5/16.3,. INR is 8.84 MAP in the 90s, Temp 35.1. He denies any chest pain, shortness of breath, dizziness, abd pain, nausea or vomiting.       (2021 16:57)      Surgery/Hospital Course:   admit for melena w/ anemia, INR 8.84   6/15 Capsul study (+) for small bowel bleed, balloon endoscopy (old blood in prox ileum); post EGD - septic w/ L opacity, re-intubated for concern for aspiration, TTE (Mod MR, decrease biV w/ interventricular septum boweing towards R)   bronch    +C Diff    TC    CT C/A/P: Fluid filled colon which may be 2/2 rapid transit. Small bilateral pleffs with associates. Compressive atelectasis New ISABELLE & LLL  parenchymal opacities, suspicious for pneumonia. 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    #8 Shiley trach at bedside    CPAP trials    LVAD speed increased to 9200   Bronch; Central line dc'ed   TC since , continue as tolerated. Patient transferred to SDU.    Cont PT, no trach downsize today(#8cuffed)/ Anticoagulation/ Continue TF, speech f/u/ Vanco taper    oob to chair  INR  2.47  5 mg coumadin     increased lop to 25 q8  per HF   INR today 2.64.  H&H 7.3/24 this AM.  Will repeat CBC at noon, and will send stool guaiac Patient with persistent abdominal tenderness, rate of tube feeds decreased.  No nausea/vomiting.     INR today 2.4H&H 9.1.6 low flow overnight /N&V  NPO resolved for capsule study  Coumadin on hold refusing Tube feeds on D5  normal  @50 cc/hr   INR 2.69  H&H 7..1 refusing Tube feeds on D5 /2 normal  @50 cc/hr. This am + BM Anusha Oneill HF  aware- PRBC x1  GI team consulted -  NPO  plan on study in am-  D/w Dr Cadet Patient  to return to CTU for further management; 1PRBCS    Post op INR 2.2 today.  No bleeding. BC + for SM.  Pt is hypotensive requiring pressor and inotropic support.  ID follow up today on Cefepime will follow.   R PTC for PTX    CT C/A/P: sub q emphysema in R chest wall, GGO RUL, small ascites CTH negative; Abd US: GB thickening, pericholecystic fluid     Perchole drain in place continues to drain total output overnight 133.  Fever today 38.8 given tylenol.  Will repeat BC now.      Today:  OOBTC. Continue to ambulate as pt tolerates.    REVIEW OF SYSTEMS:  Gen: No fever  EYES/ENT: No visual changes;  No vertigo or throat pain   NECK: No pain   RES:  No shortness of breath or Cough  CARD: No chest pain   GI: No abdominal pain  : No dysuria  NEURO: No weakness  SKIN: No itching, rashes           Vital Signs Last 24 Hrs  T(C): 37.2 (01 Aug 2021 16:00), Max: 37.9 (2021 20:00)  T(F): 99 (01 Aug 2021 16:00), Max: 100.2 (2021 20:00)  HR: 80 (01 Aug 2021 16:00) (62 - 81)  BP: --  BP(mean): --  RR: 22 (01 Aug 2021 16:00) (18 - 40)  SpO2: 100% (01 Aug 2021 16:00) (98% - 100%)  I&O's Detail    2021 07:01  -  01 Aug 2021 07:00  --------------------------------------------------------  IN:    Dexmedetomidine: 89.7 mL    Insulin: 11 mL    IV PiggyBack: 200 mL    IV PiggyBack: 200 mL    Miscellaneous Tube Feedin mL    Pantoprazole: 240 mL    sodium chloride 0.9%: 50 mL  Total IN: 2000.7 mL    OUT:    Chest Tube (mL): 60 mL    Drain (mL): 385 mL    Indwelling Catheter - Urethral (mL): 1075 mL  Total OUT: 1520 mL    Total NET: 480.7 mL      01 Aug 2021 07:01  -  01 Aug 2021 16:35  --------------------------------------------------------  IN:    Dexmedetomidine: 19.5 mL    IV PiggyBack: 100 mL    Miscellaneous Tube Feedin mL    Pantoprazole: 90 mL    sodium chloride 0.9%: 20 mL  Total IN: 769.5 mL    OUT:    Chest Tube (mL): 40 mL    Drain (mL): 200 mL    Indwelling Catheter - Urethral (mL): 1115 mL  Total OUT: 1355 mL    Total NET: -585.5 mL    Physical Exam:  Gen:  Awake, alert   CNS: non focal 	  Neck: no JVD, +TC  RES : clear , no wheezing              Chest: +chest tubes  CVS: Regular  rhythm. Normal S1/S2  Abd: Soft, non-distended. Bowel sounds present.  Skin: No rash.  Ext:  no edema                            8.1    13.03 )-----------( 153      ( 01 Aug 2021 00:32 )             26.5       COUMADIN:  Yes/No. REASON: .            138  |  106  |  12  ----------------------------<  138<H>  4.0   |  24  |  0.51    Ca    8.8      01 Aug 2021 00:32  Phos  2.9       Mg     1.8         TPro  6.6  /  Alb  3.0<L>  /  TBili  0.7  /  DBili  x   /  AST  21  /  ALT  93<H>  /  AlkPhos  181<H>        Urinalysis Basic - ( 01 Aug 2021 12:29 )    Color: Colorless / Appearance: Clear / S.010 / pH: x  Gluc: x / Ketone: Negative  / Bili: Negative / Urobili: Negative   Blood: x / Protein: Negative / Nitrite: Negative   Leuk Esterase: Negative / RBC: 4 /hpf / WBC 1 /HPF   Sq Epi: x / Non Sq Epi: 1 /hpf / Bacteria: Negative        MEDICATIONS  (STANDING):  aMIOdarone    Tablet 200 milliGRAM(s) Oral daily  cefepime   IVPB 1000 milliGRAM(s) IV Intermittent every 8 hours  chlorhexidine 0.12% Liquid 15 milliLiter(s) Oral Mucosa every 12 hours  chlorhexidine 2% Cloths 1 Application(s) Topical <User Schedule>  clonazePAM  Tablet 0.5 milliGRAM(s) Oral at bedtime  dexMEDEtomidine Infusion 0.5 MICROgram(s)/kG/Hr (9.81 mL/Hr) IV Continuous <Continuous>  dextrose 40% Gel 15 Gram(s) Oral once  dextrose 5%. 1000 milliLiter(s) (50 mL/Hr) IV Continuous <Continuous>  dextrose 5%. 1000 milliLiter(s) (100 mL/Hr) IV Continuous <Continuous>  dextrose 50% Injectable 25 Gram(s) IV Push once  dextrose 50% Injectable 12.5 Gram(s) IV Push once  dextrose 50% Injectable 25 Gram(s) IV Push once  glucagon  Injectable 1 milliGRAM(s) IntraMuscular once  heparin   Injectable 5000 Unit(s) SubCutaneous every 8 hours  insulin lispro (ADMELOG) corrective regimen sliding scale   SubCutaneous every 4 hours  lactulose Syrup 30 Gram(s) Oral once  metroNIDAZOLE  IVPB 500 milliGRAM(s) IV Intermittent every 8 hours  octreotide  Injectable 50 MICROGram(s) IV Push every 8 hours  pantoprazole Infusion 8 mG/Hr (10 mL/Hr) IV Continuous <Continuous>  polyethylene glycol 3350 17 Gram(s) Oral daily  sodium chloride 0.9%. 1000 milliLiter(s) (5 mL/Hr) IV Continuous <Continuous>  vancomycin    Solution 125 milliGRAM(s) Oral every 12 hours    MEDICATIONS  (PRN):  acetaminophen    Suspension .. 650 milliGRAM(s) Oral every 6 hours PRN Mild Pain (1 - 3)  simethicone 80 milliGRAM(s) Chew every 8 hours PRN Dyspepsia        RADIOLOGY & ADDITIONAL TESTS:  ======================Micro/Rad/Cardio=================  Culture: Reviewed   CXR: Reviewed  Echo:Reviewed  ======================================================  PAST MEDICAL & SURGICAL HISTORY:  CHF (congestive heart failure)    CAD (coronary artery disease)    Depression    Pleural effusion    History of 2019 novel coronavirus disease (COVID-19)  2020    Hemorrhoids    Bleeding hemorrhoids    Peripheral arterial disease    Claudication    BPH with urinary obstruction    ACC/AHA stage D systolic heart failure    Anticoagulation goal of INR 2.0 to 2.5    Falls    Clavicle fracture    CKD (chronic kidney disease), stage III    Iron deficiency anemia    H/O epistaxis    Vertigo    GI bleed    S/P TVR (tricuspid valve repair)    S/P ventricular assist device    S/P endoscopy      ====================ASSESSMENT ==============    Stage D Nonischemic Cardiomyopathy, Status Post HM2 on 2017    Cardiogenic shock  Hemodynamic instability   Acute hypoxemic respiratory failure  GI bleed , Status Post Enteroscopy   Anemia, in setting of melena   Thrombocytopenia   Leukocytosis   Chronic Kidney Disease  Stress hyperglycemia   C.diff positive on    Hypovolemic shock              Plan:  ====================== NEUROLOGY=====================  CTH on  with no acute intracranial hemorrhage or acute territorial infarction    Tylenol PRN for analgesia   C/w clonazepam and precedex for agitation  OOBTC. Continue to ambulate as pt tolerates.    acetaminophen    Suspension .. 650 milliGRAM(s) Oral every 6 hours PRN Mild Pain (1 - 3)  clonazePAM  Tablet 0.5 milliGRAM(s) Oral at bedtime  dexMEDEtomidine Infusion 0.5 MICROgram(s)/kG/Hr (9.81 mL/Hr) IV Continuous <Continuous>  ==================== RESPIRATORY======================  Acute hypoxemic respiratory failure s/p #8 shiley trach on ; CPAP/TC vent weaning as tolerated.   Requiring full vent support, continue close monitoring of respiratory rate and breathing pattern, following of ABG’s with A-line monitoring, continuous pulse oximetry monitoring.   R PTC placed on  for mod R PTX, continue to monitor output.   CT chest : Sub q emphysema in R chest wall, GGO RUL   ====================CARDIOVASCULAR==================  Stage D Nonischemic Cardiomyopathy, Status Post HM2 on 2017; LVAD settings 9200 with flow of 6.2  TTE : reveals at least moderate MR, mild AI, severe global LV syst dysfxn, dec RV fxn.  Continue invasive hemodynamic monitoring   Rate control with amiodarone   MIOdarone    Tablet 200 milliGRAM(s) Oral daily  ===================HEMATOLOGIC/ONC ===================  Anemia due to acute blood loss associated with upper GI bleed   Thrombocytopenia, 123  Monitor H&H/Plts   ===================== RENAL =========================  Continue monitoring urine output, I&OS, BUN/Cr   Replete lytes PRN. Keep K> 4 and Mg >2   Continue diuresis prn for goal CVP 8-10.     ==================== GASTROINTESTINAL===================  Tolerating Jevity 1.2 shantelle TF, @ goal 60 cc/hr.   GI bleed in setting of recent melena, GI following - continue Protonix drip   As per GI, not stable for endoscopic evaluation, transfuse prn and f/u repeat CBC. May consider enteroscopy when stabilized.   CT A/P on : small ascites; US yesterday showing GB wall thickening w/ surrounding pericholecystic fluid  S/p cholecystostomy tube this AM with IR with -60cc of black bile, will monitor site closely.   C.diff + on , continue vanco   Simethicone PRN dyspepsia     pantoprazole Infusion 8 mG/Hr (10 mL/Hr) IV Continuous <Continuous>  simethicone 80 milliGRAM(s) Chew every 8 hours PRN Dyspepsia  sodium chloride 0.9%. 1000 milliLiter(s) (5 mL/Hr) IV Continuous <Continuous>  =======================    ENDOCRINE  =====================  Stress hyperglycemia, continue glucose control with IV insulin drip     insulin regular Infusion 3 Unit(s)/Hr (3 mL/Hr) IV Continuous <Continuous>  octreotide  Injectable 50 MICROGram(s) IV Push every 8 hours  ========================INFECTIOUS DISEASE================  Temp 100.8F , Luekocytosis with WBC currently 12.46  Monitor temperature and trend WBC   BCx+  +Serratia marcescens c/w cefepime and metronidazole. Per ID, will consider adding bactrim if clinical condition worsens.   C.diff + on , continue vanco     cefepime   IVPB 1000 milliGRAM(s) IV Intermittent every 8 hours  metroNIDAZOLE  IVPB 500 milliGRAM(s) IV Intermittent every 8 hours  vancomycin    Solution 125 milliGRAM(s) Oral every 12 hours      Patient requires continuous monitoring with bedside rhythm monitoring, pulse ox monitoring, and intermittent blood gas analysis. Care plan discussed with ICU care team. Patient remained critical and at risk for life threatening decompensation.

## 2021-08-01 NOTE — PROGRESS NOTE ADULT - ASSESSMENT
Assessment and Recommendation:   · Assessment	  Assessment and recommendation :  Recurrent Acute hypoxic respiratory Failure S/P tracheostomy on full ventilatory  at 40% Fi02, /20/5 PEEP  C-PAP trial with PS of 10 CM H20   Acute blood loss anemia S/P multiple  blood transfusion   septic shock off vasopressin , levophed and off  Dobutamine   sepsis with serratia marcescens in the blood on cefepime  S/P cholecystostomy tube placement by IR    RT pneumothorax Pig tail cathter in place   S/P Acute left lower lobe pneumonia   Aspiration pneumonia   Stool is  +ve for C-diff. colitis on PO vancomycin  improved  Non ischemic cardiomyopathy continue ACE inhibitor and B-Blockers   S/P Septic shock and cardiogenic shock   Stage D systolic heart failure S/P LVAD HM2   MH2 LVAD  with  TV Annuloplasty  Severe peripheral vascular disease   severe hyperglycemia on insulin coverage    cardiac arrythmia  on  Amiodarone   critical care polyneuropathy   Anemia of Acute blood Loss   S/P Small bowel bleeding   S/P blood and FFP transfusion   Chronic kidney disease stage III   NGT feeding on Jevity   PT and OT at bed side   GI prophylaxis with PPI   critical care time is 35 minutes

## 2021-08-02 NOTE — PROGRESS NOTE ADULT - PROBLEM SELECTOR PLAN 4
- No active bleeding seen on past enteroscopy  - more recently, downtrending Hgb despite therapeutic INR   - s/p 2U PRBC 7/23 for Hgb 6.7 with appropriate response  - positive stool guaiac 7/23  - plan for endoscopy when stable   - Continue PPI  - transfuse PRN  - appreciate GI recs  - on octreotide 50 IV q8h - No active bleeding seen on past enteroscopy  - Hgb stable for now, will hold AC indefinitely  - Continue PPI  - transfuse PRN  - appreciate GI recs  - on octreotide 50 IV q8h - No active bleeding seen on past enteroscopy  - Hgb stable for now, will hold AC/antiplatelets indefinitely  - Continue PPI  - transfuse PRN  - appreciate GI recs  - on octreotide 50 IV q8h

## 2021-08-02 NOTE — PROGRESS NOTE ADULT - SUBJECTIVE AND OBJECTIVE BOX
RICKY HAMPTON  MRN-[id number]  Patient is a 66y old  Male who presents with a chief complaint of Anemia, Supratherapeutic INR, Dark Stools (02 Aug 2021 10:11)    HPI:  65M PMH ACC/AHA stage D HF due to NICM HM2 LVAD , TV annuloplasty ring 17 as destination therapy due to severe peripheral artery disease with significant stenosis  SIADH, Depression, CKD-3 with hyperkalemia, past E. coli UTIs, driveline drainage (21) and COVID-19 (back in 2020)  He was recently seen in clinic where he complained of abdominal pain and dark stools w constipation back in May. He presents to [facility] ER today weakness and fatigue, moderate and + Black stools for three days, on coumadin secondary to warfarin use in the setting of an LVAD. Patient has required transfusions for GIB in the past. Mostly recently back in 2021 pt had anemia with dark stools. No interventions was done at that time. However Last Endoscopy was done in 2020 (negative). Today labs show patient is anemic with H/H of 4.5/16.3,. INR is 8.84 MAP in the 90s, Temp 35.1. He denies any chest pain, shortness of breath, dizziness, abd pain, nausea or vomiting.       (2021 16:57)      Surgery/Hospital Course:   admit for melena w/ anemia, INR 8.84   6/15 Capsul study (+) for small bowel bleed, balloon endoscopy (old blood in prox ileum); post EGD - septic w/ L opacity, re-intubated for concern for aspiration, TTE (Mod MR, decrease biV w/ interventricular septum boweing towards R)   bronch    +C Diff    TC    CT C/A/P: Fluid filled colon which may be 2/2 rapid transit. Small bilateral pleffs with associates. Compressive atelectasis New ISABELLE & LLL  parenchymal opacities, suspicious for pneumonia. 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    #8 Shiley trach at bedside    CPAP trials    LVAD speed increased to 9200   Bronch; Central line dc'ed   TC since , continue as tolerated. Patient transferred to SDU.    Cont PT, no trach downsize today(#8cuffed)/ Anticoagulation/ Continue TF, speech f/u/ Vanco taper    oob to chair  INR  2.47  5 mg coumadin     increased lop to 25 q8  per HF   INR today 2.64.  H&H 7.3 this AM.  Will repeat CBC at noon, and will send stool guaiac Patient with persistent abdominal tenderness, rate of tube feeds decreased.  No nausea/vomiting.     INR today 2.4H&H 9.1/.6 low flow overnight /N&V  NPO resolved for capsule study  Coumadin on hold refusing Tube feeds on D5 / normal  @50 cc/hr   INR 2.69  H&H 7..1 refusing Tube feeds on D5 1/2 normal  @50 cc/hr. This am + BM Anusha Oneill HF  aware- PRBC x1  GI team consulted -  NPO  plan on study in am-  D/w Dr Cadet Patient  to return to CTU for further management; 1PRBCS    Post op INR 2.2 today.  No bleeding. BC + for SM.  Pt is hypotensive requiring pressor and inotropic support.  ID follow up today on Cefepime will follow.   R PTC for PTX    CT C/A/P: sub q emphysema in R chest wall, GGO RUL, small ascites CTH negative; Abd US: GB thickening, pericholecystic fluid     Perchole drain in place continues to drain total output overnight 133.  Fever today 38.8 given tylenol.  Will repeat BC now.      Today:    REVIEW OF SYSTEMS:  Unable to obtain secondary to pt being trached    ICU Vital Signs Last 24 Hrs  T(C): 37.5 (02 Aug 2021 08:00), Max: 37.6 (02 Aug 2021 00:00)  T(F): 99.5 (02 Aug 2021 08:00), Max: 99.7 (02 Aug 2021 00:00)  HR: 81 (02 Aug 2021 11:00) (63 - 112)  BP: --  BP(mean): --  ABP: 91/75 (02 Aug 2021 11:00) (81/67 - 104/65)  ABP(mean): 81 (02 Aug 2021 11:00) (73 - 89)  RR: 20 (02 Aug 2021 11:00) (20 - 62)  SpO2: 100% (02 Aug 2021 11:00) (100% - 100%)      Physical Exam:  Gen:  Sedated  CNS: Sedated  Neck: no JVD, +TC  RES : clear , no wheezing            Chest: +Chest tubes     CVS: Regular  rhythm. Normal S1/S2  Abd: Soft, non-distended. Bowel sounds present.  Skin: No rash.  Ext:  no edema    ============================I/O===========================   I&O's Detail    01 Aug 2021 07:01  -  02 Aug 2021 07:00  --------------------------------------------------------  IN:    Dexmedetomidine: 86.1 mL    IV PiggyBack: 300 mL    IV PiggyBack: 100 mL    Miscellaneous Tube Feedin mL    Pantoprazole: 100 mL    sodium chloride 0.9%: 95 mL  Total IN: 2001.1 mL    OUT:    Chest Tube (mL): 50 mL    Drain (mL): 400 mL    Indwelling Catheter - Urethral (mL): 1805 mL  Total OUT: 2255 mL    Total NET: -253.9 mL      02 Aug 2021 07:01  -  02 Aug 2021 11:22  --------------------------------------------------------  IN:    IV PiggyBack: 50 mL    Miscellaneous Tube Feedin mL    sodium chloride 0.9%: 20 mL  Total IN: 310 mL    OUT:    Chest Tube (mL): 10 mL    Dexmedetomidine: 0 mL    Drain (mL): 50 mL    Indwelling Catheter - Urethral (mL): 35 mL  Total OUT: 95 mL    Total NET: 215 mL        ============================ LABS =========================                        7.9    10.43 )-----------( 168      ( 02 Aug 2021 00:41 )             25.7     08-02    133<L>  |  101  |  12  ----------------------------<  184<H>  4.1   |  23  |  0.52    Ca    8.7      02 Aug 2021 00:41  Phos  2.8     08-  Mg     1.7     08-02    TPro  6.2  /  Alb  2.6<L>  /  TBili  0.5  /  DBili  x   /  AST  15  /  ALT  60<H>  /  AlkPhos  169<H>  08    LIVER FUNCTIONS - ( 02 Aug 2021 00:41 )  Alb: 2.6 g/dL / Pro: 6.2 g/dL / ALK PHOS: 169 U/L / ALT: 60 U/L / AST: 15 U/L / GGT: x           PT/INR - ( 02 Aug 2021 00:41 )   PT: 16.8 sec;   INR: 1.42 ratio         PTT - ( 02 Aug 2021 00:41 )  PTT:27.7 sec  ABG - ( 02 Aug 2021 00:34 )  pH, Arterial: 7.43  pH, Blood: x     /  pCO2: 41    /  pO2: 176   / HCO3: 27    / Base Excess: 3.0   /  SaO2: 100               Urinalysis Basic - ( 01 Aug 2021 12:29 )    Color: Colorless / Appearance: Clear / S.010 / pH: x  Gluc: x / Ketone: Negative  / Bili: Negative / Urobili: Negative   Blood: x / Protein: Negative / Nitrite: Negative   Leuk Esterase: Negative / RBC: 4 /hpf / WBC 1 /HPF   Sq Epi: x / Non Sq Epi: 1 /hpf / Bacteria: Negative      ======================Micro/Rad/Cardio=================  Culture: Reviewed   CXR: Reviewed  Echo:Reviewed  ======================================================  PAST MEDICAL & SURGICAL HISTORY:  CHF (congestive heart failure)    CAD (coronary artery disease)    Depression    Pleural effusion    History of 2019 novel coronavirus disease (COVID-19)  2020    Hemorrhoids    Bleeding hemorrhoids    Peripheral arterial disease    Claudication    BPH with urinary obstruction    ACC/AHA stage D systolic heart failure    Anticoagulation goal of INR 2.0 to 2.5    Falls    Clavicle fracture    CKD (chronic kidney disease), stage III    Iron deficiency anemia    H/O epistaxis    Vertigo    GI bleed    S/P TVR (tricuspid valve repair)    S/P ventricular assist device    S/P endoscopy      ====================ASSESSMENT ==============  Stage D Nonischemic Cardiomyopathy, Status Post HM2 on 2017    Cardiogenic shock  Hemodynamic instability   Acute hypoxemic respiratory failure  GI bleed , Status Post Enteroscopy   Anemia, in setting of melena   Thrombocytopenia   Leukocytosis   Chronic Kidney Disease  Stress hyperglycemia   C.diff positive on    Hypovolemic shock      Plan:  ====================== NEUROLOGY=====================  CTH on  with no acute intracranial hemorrhage or acute territorial infarction   Sedated with IV Precedex to maintain vent synchrony   Tylenol PRN for analgesia   C/w clonazepam for agitation    acetaminophen    Suspension .. 650 milliGRAM(s) Oral every 6 hours PRN Mild Pain (1 - 3)  clonazePAM  Tablet 0.5 milliGRAM(s) Oral at bedtime  dexMEDEtomidine Infusion 0.5 MICROgram(s)/kG/Hr (9.81 mL/Hr) IV Continuous <Continuous>  ==================== RESPIRATORY======================  Acute hypoxemic respiratory failure s/p #8 shiley trach on ; CPAP/TC vent weaning as tolerated.  Requiring full vent support, continue close monitoring of respiratory rate and breathing pattern, following of ABG’s with A-line monitoring, continuous pulse oximetry monitoring.   R PTC placed on  for mod R PTX, continue to monitor output.   CT chest : Sub q emphysema in R chest wall, GGO RUL       Mechanical Ventilation:  Mode: AC/ CMV (Assist Control/ Continuous Mandatory Ventilation)  RR (machine): 20  TV (machine): 500  FiO2: 40  PEEP: 5  ITime: 1.1  MAP: 12  PIP: 27      ====================CARDIOVASCULAR==================  Stage D Nonischemic Cardiomyopathy, Status Post HM2 on 2017; LVAD settings 9200 with flow of 6.2  TTE : reveals at least moderate MR, mild AI, severe global LV syst dysfxn, dec RV fxn.  Continue invasive hemodynamic monitoring   Rate control with amiodarone     aMIOdarone    Tablet 200 milliGRAM(s) Oral daily  ===================HEMATOLOGIC/ONC ==============  Anemia due to acute blood loss associated with upper GI bleed   Monitor H&H/Plts   `  heparin   Injectable 5000 Unit(s) SubCutaneous every 8 hours  ===================== RENAL =========================  Continue monitoring urine output, I&OS, BUN/Cr   Replete lytes PRN. Keep K> 4 and Mg >2   Continue diuresis prn for goal CVP 8-10.     ==================== GASTROINTESTINAL===================  Tolerating Jevity 1.2 shantelle TF, @ goal 60 cc/hr.   GI bleed in setting of recent melena, GI following - continue Protonix drip   As per GI, not stable for endoscopic evaluation, transfuse prn and f/u repeat CBC. May consider enteroscopy when stabilized.   CT A/P on : small ascites; US yesterday showing GB wall thickening w/ surrounding pericholecystic fluid  S/p cholecystostomy tube yesterday with IR with -60cc of black bile, will monitor site closely.   C.diff + on , continue vanco   Simethicone PRN dyspepsia     pantoprazole  Injectable 40 milliGRAM(s) IV Push every 12 hours  polyethylene glycol 3350 17 Gram(s) Oral daily  simethicone 80 milliGRAM(s) Chew every 8 hours PRN Dyspepsia  sodium chloride 0.9%. 1000 milliLiter(s) (5 mL/Hr) IV Continuous <Continuous>  =======================    ENDOCRINE  =====================  Stress hyperglycemia, continue glucose control with admelog sliding scale.   Monitor glucose levels  C/w octreotide    insulin lispro (ADMELOG) corrective regimen sliding scale   SubCutaneous every 4 hours  octreotide  Injectable 50 MICROGram(s) IV Push every 8 hours  ========================INFECTIOUS DISEASE================  Temp 99.8F , WBC within normal limits.   Monitor temperature and trend WBC   BCx+  +Serratia marcescens c/w cefepime and metronidazole. Per ID, will consider adding bactrim if clinical condition worsens.   C.diff + on , continue vanco     cefepime   IVPB 1000 milliGRAM(s) IV Intermittent every 8 hours  metroNIDAZOLE  IVPB 500 milliGRAM(s) IV Intermittent every 8 hours  vancomycin    Solution 125 milliGRAM(s) Oral every 12 hours      Patient requires continuous monitoring with bedside rhythm monitoring, pulse ox monitoring, and intermittent blood gas analysis. Care plan discussed with ICU care team. Patient remained critical and at risk for life threatening decompensation.     By signing my name below, I, Emily Roa, attest that this documentation has been prepared under the direction and in the presence of _____  Electronically signed: Emily Roa Scribe, 21 @ 11:22    I, ____ , personally performed the services described in this documentation. all medical record entries made by the romel were at my direction and in my presence. I have reviewed the chart and agree that the record reflects my personal performance and is accurate and complete  Electronically signed: ______       RICKY HAMPTON  MRN-[id number]  Patient is a 66y old  Male who presents with a chief complaint of Anemia, Supratherapeutic INR, Dark Stools (02 Aug 2021 10:11)    HPI:  65M PMH ACC/AHA stage D HF due to NICM HM2 LVAD , TV annuloplasty ring 17 as destination therapy due to severe peripheral artery disease with significant stenosis  SIADH, Depression, CKD-3 with hyperkalemia, past E. coli UTIs, driveline drainage (21) and COVID-19 (back in 2020)  He was recently seen in clinic where he complained of abdominal pain and dark stools w constipation back in May. He presents to [facility] ER today weakness and fatigue, moderate and + Black stools for three days, on coumadin secondary to warfarin use in the setting of an LVAD. Patient has required transfusions for GIB in the past. Mostly recently back in 2021 pt had anemia with dark stools. No interventions was done at that time. However Last Endoscopy was done in 2020 (negative). Today labs show patient is anemic with H/H of 4.5/16.3,. INR is 8.84 MAP in the 90s, Temp 35.1. He denies any chest pain, shortness of breath, dizziness, abd pain, nausea or vomiting.       (2021 16:57)      Surgery/Hospital Course:   admit for melena w/ anemia, INR 8.84   6/15 Capsul study (+) for small bowel bleed, balloon endoscopy (old blood in prox ileum); post EGD - septic w/ L opacity, re-intubated for concern for aspiration, TTE (Mod MR, decrease biV w/ interventricular septum boweing towards R)   bronch    +C Diff    TC    CT C/A/P: Fluid filled colon which may be 2/2 rapid transit. Small bilateral pleffs with associates. Compressive atelectasis New ISABELLE & LLL  parenchymal opacities, suspicious for pneumonia. 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    #8 Shiley trach at bedside    CPAP trials    LVAD speed increased to 9200   Bronch; Central line dc'ed   TC since , continue as tolerated. Patient transferred to SDU.    Cont PT, no trach downsize today(#8cuffed)/ Anticoagulation/ Continue TF, speech f/u/ Vanco taper    oob to chair  INR  2.47  5 mg coumadin     increased lop to 25 q8  per HF   INR today 2.64.  H&H 7.3 this AM.  Will repeat CBC at noon, and will send stool guaiac Patient with persistent abdominal tenderness, rate of tube feeds decreased.  No nausea/vomiting.     INR today 2.4H&H 9.1/.6 low flow overnight /N&V  NPO resolved for capsule study  Coumadin on hold refusing Tube feeds on D5 / normal  @50 cc/hr   INR 2.69  H&H 7..1 refusing Tube feeds on D5 12 normal  @50 cc/hr. This am + BM Anusha Oneill HF  aware- PRBC x1  GI team consulted -  NPO  plan on study in am-  D/w Dr Cadet Patient  to return to CTU for further management; 1PRBCS    Post op INR 2.2 today.  No bleeding. BC + for SM.  Pt is hypotensive requiring pressor and inotropic support.  ID follow up today on Cefepime will follow.   R PTC for PTX    CT C/A/P: sub q emphysema in R chest wall, GGO RUL, small ascites CTH negative; Abd US: GB thickening, pericholecystic fluid     Perchole drain in place continues to drain total output overnight 133.  Fever today 38.8 given tylenol.  Will repeat BC now.      Today:  One episode of BM was given miralax. Precedex has been dc'ed     REVIEW OF SYSTEMS:  Unable to obtain secondary to pt being trached    ICU Vital Signs Last 24 Hrs  T(C): 37.5 (02 Aug 2021 08:00), Max: 37.6 (02 Aug 2021 00:00)  T(F): 99.5 (02 Aug 2021 08:00), Max: 99.7 (02 Aug 2021 00:00)  HR: 81 (02 Aug 2021 11:00) (63 - 112)  BP: --  BP(mean): --  ABP: 91/75 (02 Aug 2021 11:00) (81/67 - 104/65)  ABP(mean): 81 (02 Aug 2021 11:00) (73 - 89)  RR: 20 (02 Aug 2021 11:00) (20 - 62)  SpO2: 100% (02 Aug 2021 11:00) (100% - 100%)      Physical Exam:  Gen:  Sedated  CNS: Sedated  Neck: no JVD, +TC  RES : clear , no wheezing            Chest: +Chest tubes     CVS: Regular  rhythm. Normal S1/S2  Abd: Soft, non-distended. Bowel sounds present.  Skin: No rash.  Ext:  no edema    ============================I/O===========================   I&O's Detail    01 Aug 2021 07:01  -  02 Aug 2021 07:00  --------------------------------------------------------  IN:    Dexmedetomidine: 86.1 mL    IV PiggyBack: 300 mL    IV PiggyBack: 100 mL    Miscellaneous Tube Feedin mL    Pantoprazole: 100 mL    sodium chloride 0.9%: 95 mL  Total IN: 2001.1 mL    OUT:    Chest Tube (mL): 50 mL    Drain (mL): 400 mL    Indwelling Catheter - Urethral (mL): 1805 mL  Total OUT: 2255 mL    Total NET: -253.9 mL      02 Aug 2021 07:01  -  02 Aug 2021 11:22  --------------------------------------------------------  IN:    IV PiggyBack: 50 mL    Miscellaneous Tube Feedin mL    sodium chloride 0.9%: 20 mL  Total IN: 310 mL    OUT:    Chest Tube (mL): 10 mL    Dexmedetomidine: 0 mL    Drain (mL): 50 mL    Indwelling Catheter - Urethral (mL): 35 mL  Total OUT: 95 mL    Total NET: 215 mL        ============================ LABS =========================                        7.9    10.43 )-----------( 168      ( 02 Aug 2021 00:41 )             25.7     08-02    133<L>  |  101  |  12  ----------------------------<  184<H>  4.1   |  23  |  0.52    Ca    8.7      02 Aug 2021 00:41  Phos  2.8     08-  Mg     1.7     08-    TPro  6.2  /  Alb  2.6<L>  /  TBili  0.5  /  DBili  x   /  AST  15  /  ALT  60<H>  /  AlkPhos  169<H>  08    LIVER FUNCTIONS - ( 02 Aug 2021 00:41 )  Alb: 2.6 g/dL / Pro: 6.2 g/dL / ALK PHOS: 169 U/L / ALT: 60 U/L / AST: 15 U/L / GGT: x           PT/INR - ( 02 Aug 2021 00:41 )   PT: 16.8 sec;   INR: 1.42 ratio         PTT - ( 02 Aug 2021 00:41 )  PTT:27.7 sec  ABG - ( 02 Aug 2021 00:34 )  pH, Arterial: 7.43  pH, Blood: x     /  pCO2: 41    /  pO2: 176   / HCO3: 27    / Base Excess: 3.0   /  SaO2: 100               Urinalysis Basic - ( 01 Aug 2021 12:29 )    Color: Colorless / Appearance: Clear / S.010 / pH: x  Gluc: x / Ketone: Negative  / Bili: Negative / Urobili: Negative   Blood: x / Protein: Negative / Nitrite: Negative   Leuk Esterase: Negative / RBC: 4 /hpf / WBC 1 /HPF   Sq Epi: x / Non Sq Epi: 1 /hpf / Bacteria: Negative      ======================Micro/Rad/Cardio=================  Culture: Reviewed   CXR: Reviewed  Echo:Reviewed  ======================================================  PAST MEDICAL & SURGICAL HISTORY:  CHF (congestive heart failure)    CAD (coronary artery disease)    Depression    Pleural effusion    History of  novel coronavirus disease (COVID-19)  2020    Hemorrhoids    Bleeding hemorrhoids    Peripheral arterial disease    Claudication    BPH with urinary obstruction    ACC/AHA stage D systolic heart failure    Anticoagulation goal of INR 2.0 to 2.5    Falls    Clavicle fracture    CKD (chronic kidney disease), stage III    Iron deficiency anemia    H/O epistaxis    Vertigo    GI bleed    S/P TVR (tricuspid valve repair)    S/P ventricular assist device    S/P endoscopy      ====================ASSESSMENT ==============  Stage D Nonischemic Cardiomyopathy, Status Post HM2 on 2017    Cardiogenic shock  Hemodynamic instability   Acute hypoxemic respiratory failure  GI bleed , Status Post Enteroscopy   Anemia, in setting of melena   Thrombocytopenia   Leukocytosis   Chronic Kidney Disease  Stress hyperglycemia   C.diff positive on    Hypovolemic shock      Plan:  ====================== NEUROLOGY=====================  CTH on  with no acute intracranial hemorrhage or acute territorial infarction   Precedex has been dc'ed, Will assess neuro status  Tylenol PRN for analgesia   C/w clonazepam for agitation    acetaminophen    Suspension .. 650 milliGRAM(s) Oral every 6 hours PRN Mild Pain (1 - 3)  clonazePAM  Tablet 0.5 milliGRAM(s) Oral at bedtime  dexMEDEtomidine Infusion 0.5 MICROgram(s)/kG/Hr (9.81 mL/Hr) IV Continuous <Continuous>  ==================== RESPIRATORY======================  Acute hypoxemic respiratory failure s/p #8 shiley trach on ; CPAP/TC vent weaning as tolerated.  Requiring full vent support, continue close monitoring of respiratory rate and breathing pattern, following of ABG’s with A-line monitoring, continuous pulse oximetry monitoring.   R PTC placed on  for mod R PTX, continue to monitor output.   CT chest : Sub q emphysema in R chest wall, GGO RUL       Mechanical Ventilation:  Mode: AC/ CMV (Assist Control/ Continuous Mandatory Ventilation)  RR (machine): 20  TV (machine): 500  FiO2: 40  PEEP: 5  ITime: 1.1  MAP: 12  PIP: 27      ====================CARDIOVASCULAR==================  Stage D Nonischemic Cardiomyopathy, Status Post HM2 on 2017; LVAD settings 9200 with flow of 6.2  TTE : reveals at least moderate MR, mild AI, severe global LV syst dysfxn, dec RV fxn.  Continue invasive hemodynamic monitoring   Rate control with amiodarone     aMIOdarone    Tablet 200 milliGRAM(s) Oral daily  ===================HEMATOLOGIC/ONC ==============  Anemia due to acute blood loss associated with upper GI bleed   Monitor H&H/Plts   `  heparin   Injectable 5000 Unit(s) SubCutaneous every 8 hours  ===================== RENAL =========================  Continue monitoring urine output, I&OS, BUN/Cr   Replete lytes PRN. Keep K> 4 and Mg >2   Continue diuresis prn for goal CVP 8-10.     ==================== GASTROINTESTINAL===================  Tolerating Jevity 1.2 shantelle TF, @ goal 60 cc/hr.   GI bleed in setting of recent melena, GI following - continue Protonix drip   As per GI, not stable for endoscopic evaluation, transfuse prn and f/u repeat CBC. May consider enteroscopy when stabilized.   CT A/P on : small ascites; US yesterday showing GB wall thickening w/ surrounding pericholecystic fluid  S/p cholecystostomy tube yesterday with IR with -60cc of black bile, will monitor site closely.   C.diff + on , continue vanco   Simethicone PRN dyspepsia     pantoprazole  Injectable 40 milliGRAM(s) IV Push every 12 hours  polyethylene glycol 3350 17 Gram(s) Oral daily  simethicone 80 milliGRAM(s) Chew every 8 hours PRN Dyspepsia  sodium chloride 0.9%. 1000 milliLiter(s) (5 mL/Hr) IV Continuous <Continuous>  =======================    ENDOCRINE  =====================  Stress hyperglycemia, continue glucose control with admelog sliding scale.   Monitor glucose levels  C/w octreotide    insulin lispro (ADMELOG) corrective regimen sliding scale   SubCutaneous every 4 hours  octreotide  Injectable 50 MICROGram(s) IV Push every 8 hours  ========================INFECTIOUS DISEASE================  Temp 99.8F , WBC within normal limits.   Monitor temperature and trend WBC   BCx+  +Serratia marcescens c/w cefepime and metronidazole. Per ID, will consider adding bactrim if clinical condition worsens.   C.diff + on , continue vanco     cefepime   IVPB 1000 milliGRAM(s) IV Intermittent every 8 hours  metroNIDAZOLE  IVPB 500 milliGRAM(s) IV Intermittent every 8 hours  vancomycin    Solution 125 milliGRAM(s) Oral every 12 hours      Patient requires continuous monitoring with bedside rhythm monitoring, pulse ox monitoring, and intermittent blood gas analysis. Care plan discussed with ICU care team. Patient remained critical and at risk for life threatening decompensation.     By signing my name below, Janina STANLEY Tiffany, attest that this documentation has been prepared under the direction and in the presence of CLAUDIA Lee  Electronically signed: Emily Roa Scribe, 21 @ 11:22    Georgina STANLEY PA , personally performed the services described in this documentation. all medical record entries made by the scribe were at my direction and in my presence. I have reviewed the chart and agree that the record reflects my personal performance and is accurate and complete  Electronically signed: CLAUDIA Lee        RICKY HAMPTON  MRN-[id number]  Patient is a 66y old  Male who presents with a chief complaint of Anemia, Supratherapeutic INR, Dark Stools (02 Aug 2021 10:11)    HPI:  65M PMH ACC/AHA stage D HF due to NICM HM2 LVAD , TV annuloplasty ring 17 as destination therapy due to severe peripheral artery disease with significant stenosis  SIADH, Depression, CKD-3 with hyperkalemia, past E. coli UTIs, driveline drainage (21) and COVID-19 (back in 2020)  He was recently seen in clinic where he complained of abdominal pain and dark stools w constipation back in May. He presents to [facility] ER today weakness and fatigue, moderate and + Black stools for three days, on coumadin secondary to warfarin use in the setting of an LVAD. Patient has required transfusions for GIB in the past. Mostly recently back in 2021 pt had anemia with dark stools. No interventions was done at that time. However Last Endoscopy was done in 2020 (negative). Today labs show patient is anemic with H/H of 4.5/16.3,. INR is 8.84 MAP in the 90s, Temp 35.1. He denies any chest pain, shortness of breath, dizziness, abd pain, nausea or vomiting.       (2021 16:57)      Surgery/Hospital Course:   admit for melena w/ anemia, INR 8.84   6/15 Capsul study (+) for small bowel bleed, balloon endoscopy (old blood in prox ileum); post EGD - septic w/ L opacity, re-intubated for concern for aspiration, TTE (Mod MR, decrease biV w/ interventricular septum boweing towards R)   bronch    +C Diff    TC    CT C/A/P: Fluid filled colon which may be 2/2 rapid transit. Small bilateral pleffs with associates. Compressive atelectasis New ISABELLE & LLL  parenchymal opacities, suspicious for pneumonia. 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    #8 Shiley trach at bedside    CPAP trials    LVAD speed increased to 9200   Bronch; Central line dc'ed   TC since , continue as tolerated. Patient transferred to SDU.    Cont PT, no trach downsize today(#8cuffed)/ Anticoagulation/ Continue TF, speech f/u/ Vanco taper    oob to chair  INR  2.47  5 mg coumadin     increased lop to 25 q8  per HF   INR today 2.64.  H&H 7.3 this AM.  Will repeat CBC at noon, and will send stool guaiac Patient with persistent abdominal tenderness, rate of tube feeds decreased.  No nausea/vomiting.     INR today 2.4H&H 9.1/.6 low flow overnight /N&V  NPO resolved for capsule study  Coumadin on hold refusing Tube feeds on D5 / normal  @50 cc/hr   INR 2.69  H&H 7..1 refusing Tube feeds on D5 1/2 normal  @50 cc/hr. This am + BM Anusha Oneill HF  aware- PRBC x1  GI team consulted -  NPO  plan on study in am-  D/w Dr Cadet Patient  to return to CTU for further management; 1PRBCS    Post op INR 2.2 today.  No bleeding. BC + for SM.  Pt is hypotensive requiring pressor and inotropic support.  ID follow up today on Cefepime will follow.   R PTC for PTX    CT C/A/P: sub q emphysema in R chest wall, GGO RUL, small ascites CTH negative; Abd US: GB thickening, pericholecystic fluid     Perchole drain in place continues to drain total output overnight 133.  Fever today 38.8 given tylenol.  Will repeat BC now.          Today:  Pigtail d/cd today. cpap 2hrs 10/5. nausea NGT replaced.           REVIEW OF SYSTEMS:  Unable to obtain secondary to pt being trached    ICU Vital Signs Last 24 Hrs  T(C): 37.5 (02 Aug 2021 08:00), Max: 37.6 (02 Aug 2021 00:00)  T(F): 99.5 (02 Aug 2021 08:00), Max: 99.7 (02 Aug 2021 00:00)  HR: 81 (02 Aug 2021 11:00) (63 - 112)  BP: --  BP(mean): --  ABP: 91/75 (02 Aug 2021 11:00) (81/67 - 104/65)  ABP(mean): 81 (02 Aug 2021 11:00) (73 - 89)  RR: 20 (02 Aug 2021 11:00) (20 - 62)  SpO2: 100% (02 Aug 2021 11:00) (100% - 100%)      Physical Exam:  Gen:  awake, alart  CNS: in tact   Neck: no JVD, +TC  RES : clear , no wheezing            Chest: +Chest tubes     CVS: Regular  rhythm. Normal S1/S2  Abd: Soft, non-distended. Bowel sounds present.  Skin: No rash.  Ext:  no edema    ============================I/O===========================   I&O's Detail    01 Aug 2021 07:01  -  02 Aug 2021 07:00  --------------------------------------------------------  IN:    Dexmedetomidine: 86.1 mL    IV PiggyBack: 300 mL    IV PiggyBack: 100 mL    Miscellaneous Tube Feedin mL    Pantoprazole: 100 mL    sodium chloride 0.9%: 95 mL  Total IN: 2001.1 mL    OUT:    Chest Tube (mL): 50 mL    Drain (mL): 400 mL    Indwelling Catheter - Urethral (mL): 1805 mL  Total OUT: 2255 mL    Total NET: -253.9 mL      02 Aug 2021 07:01  -  02 Aug 2021 11:22  --------------------------------------------------------  IN:    IV PiggyBack: 50 mL    Miscellaneous Tube Feedin mL    sodium chloride 0.9%: 20 mL  Total IN: 310 mL    OUT:    Chest Tube (mL): 10 mL    Dexmedetomidine: 0 mL    Drain (mL): 50 mL    Indwelling Catheter - Urethral (mL): 35 mL  Total OUT: 95 mL    Total NET: 215 mL        ============================ LABS =========================                        7.9    10.43 )-----------( 168      ( 02 Aug 2021 00:41 )             25.7     08-02    133<L>  |  101  |  12  ----------------------------<  184<H>  4.1   |  23  |  0.52    Ca    8.7      02 Aug 2021 00:41  Phos  2.8     08-  Mg     1.7     08-02    TPro  6.2  /  Alb  2.6<L>  /  TBili  0.5  /  DBili  x   /  AST  15  /  ALT  60<H>  /  AlkPhos  169<H>  08    LIVER FUNCTIONS - ( 02 Aug 2021 00:41 )  Alb: 2.6 g/dL / Pro: 6.2 g/dL / ALK PHOS: 169 U/L / ALT: 60 U/L / AST: 15 U/L / GGT: x           PT/INR - ( 02 Aug 2021 00:41 )   PT: 16.8 sec;   INR: 1.42 ratio         PTT - ( 02 Aug 2021 00:41 )  PTT:27.7 sec  ABG - ( 02 Aug 2021 00:34 )  pH, Arterial: 7.43  pH, Blood: x     /  pCO2: 41    /  pO2: 176   / HCO3: 27    / Base Excess: 3.0   /  SaO2: 100               Urinalysis Basic - ( 01 Aug 2021 12:29 )    Color: Colorless / Appearance: Clear / S.010 / pH: x  Gluc: x / Ketone: Negative  / Bili: Negative / Urobili: Negative   Blood: x / Protein: Negative / Nitrite: Negative   Leuk Esterase: Negative / RBC: 4 /hpf / WBC 1 /HPF   Sq Epi: x / Non Sq Epi: 1 /hpf / Bacteria: Negative      ======================Micro/Rad/Cardio=================  Culture: Reviewed   CXR: Reviewed  Echo:Reviewed  ======================================================  PAST MEDICAL & SURGICAL HISTORY:  CHF (congestive heart failure)    CAD (coronary artery disease)    Depression    Pleural effusion    History of  novel coronavirus disease (COVID-19)  2020    Hemorrhoids    Bleeding hemorrhoids    Peripheral arterial disease    Claudication    BPH with urinary obstruction    ACC/AHA stage D systolic heart failure    Anticoagulation goal of INR 2.0 to 2.5    Falls    Clavicle fracture    CKD (chronic kidney disease), stage III    Iron deficiency anemia    H/O epistaxis    Vertigo    GI bleed    S/P TVR (tricuspid valve repair)    S/P ventricular assist device    S/P endoscopy      ====================ASSESSMENT ==============  Stage D Nonischemic Cardiomyopathy, Status Post HM2 on 2017    Cardiogenic shock  Hemodynamic instability   Acute hypoxemic respiratory failure  GI bleed , Status Post Enteroscopy   Anemia, in setting of melena   Thrombocytopenia   Leukocytosis   Chronic Kidney Disease  Stress hyperglycemia   C.diff positive on    Hypovolemic shock  Septic shock        Plan:  ====================== NEUROLOGY=====================  CTH on  with no acute intracranial hemorrhage or acute territorial infarction   Tylenol PRN for analgesia   C/w clonazepam for agitation    acetaminophen    Suspension .. 650 milliGRAM(s) Oral every 6 hours PRN Mild Pain (1 - 3)  clonazePAM  Tablet 0.5 milliGRAM(s) Oral at bedtime  dexMEDEtomidine Infusion 0.5 MICROgram(s)/kG/Hr (9.81 mL/Hr) IV Continuous <Continuous>  ==================== RESPIRATORY======================  Acute hypoxemic respiratory failure s/p #8 shiley trach on ; CPAP/TC vent weaning as tolerated.  Requiring full vent support, continue close monitoring of respiratory rate and breathing pattern, following of ABG’s with A-line monitoring, continuous pulse oximetry monitoring.   R PTC placed on  for mod R PTX- Now /dcd on . No PTX    CT chest : Sub q emphysema in R chest wall, GGO RUL       Mechanical Ventilation:  Mode: AC/ CMV (Assist Control/ Continuous Mandatory Ventilation)  RR (machine): 20  TV (machine): 500  FiO2: 40  PEEP: 5  ITime: 1.1  MAP: 12  PIP: 27      ====================CARDIOVASCULAR==================  Stage D Nonischemic Cardiomyopathy, Status Post HM2 on 2017; LVAD settings 9200 with flow of 6.2  TTE : reveals at least moderate MR, mild AI, severe global LV syst dysfxn, dec RV fxn.  Continue invasive hemodynamic monitoring   Rate control with amiodarone     aMIOdarone    Tablet 200 milliGRAM(s) Oral daily  ===================HEMATOLOGIC/ONC ==============  Anemia due to acute blood loss associated with upper GI bleed   Monitor H&H/Plts   Hold AC for now as per Dr. Cadet   `  heparin   Injectable 5000 Unit(s) SubCutaneous every 8 hours  ===================== RENAL =========================  Continue monitoring urine output, I&OS, BUN/Cr   Replete lytes PRN. Keep K> 4 and Mg >2   Continue diuresis prn for goal CVP 8-10.     ==================== GASTROINTESTINAL===================  Tolerating Jevity 1.2 shantelle TF, @ goal 60 cc/hr.   GI bleed in setting of recent melena, GI following -  Protonix IVP BID, C/w octreotide??  As per GI, not stable for endoscopic evaluation, transfuse prn and f/u repeat CBC. May consider enteroscopy when stabilized.   CT A/P on : small ascites; ABD US showing GB wall thickening w/ surrounding pericholecystic fluid  S/p cholecystostomy tube placed on : with IR with -60cc of black bile, will monitor site closely.   C.diff + on , continue vanco   Simethicone PRN dyspepsia     pantoprazole  Injectable 40 milliGRAM(s) IV Push every 12 hours  polyethylene glycol 3350 17 Gram(s) Oral daily  simethicone 80 milliGRAM(s) Chew every 8 hours PRN Dyspepsia  sodium chloride 0.9%. 1000 milliLiter(s) (5 mL/Hr) IV Continuous <Continuous>  =======================    ENDOCRINE  =====================  Stress hyperglycemia, continue glucose control with admelog sliding scale.   Monitor glucose levels      insulin lispro (ADMELOG) corrective regimen sliding scale   SubCutaneous every 4 hours  octreotide  Injectable 50 MICROGram(s) IV Push every 8 hours  ========================INFECTIOUS DISEASE================  Temp 99.8F , WBC within normal limits.   Monitor temperature and trend WBC   BCx+  +Serratia marcescens c/w cefepime and metronidazole. Per ID, will consider adding bactrim if clinical condition worsens.   C.diff + on , continue vanco     cefepime   IVPB 1000 milliGRAM(s) IV Intermittent every 8 hours  metroNIDAZOLE  IVPB 500 milliGRAM(s) IV Intermittent every 8 hours  vancomycin    Solution 125 milliGRAM(s) Oral every 12 hours      Patient requires continuous monitoring with bedside rhythm monitoring, pulse ox monitoring, and intermittent blood gas analysis. Care plan discussed with ICU care team. Patient remained critical and at risk for life threatening decompensation.     By signing my name below, Janina STANLEY Tiffany, attest that this documentation has been prepared under the direction and in the presence of CLAUDIA Lee  Electronically signed: Emily Roa Scribe, 21 @ 11:22    Georgina STANLEY PA , personally performed the services described in this documentation. all medical record entries made by the scribe were at my direction and in my presence. I have reviewed the chart and agree that the record reflects my personal performance and is accurate and complete  Electronically signed: CLAUDIA Lee

## 2021-08-02 NOTE — CONSULT NOTE ADULT - SUBJECTIVE AND OBJECTIVE BOX
HPI:  65M PMH ACC/AHA stage D HF due to NICM HM2 LVAD , TV annuloplasty ring 17 as destination therapy due to severe peripheral artery disease with significant stenosis  SIADH, Depression, CKD-3 with hyperkalemia, past E. coli UTIs, driveline drainage (21) and COVID-19 (back in 2020)  He was recently seen in clinic where he complained of abdominal pain and dark stools w constipation back in May. He presents to [facility] ER today weakness and fatigue, moderate and + Black stools for three days, on coumadin secondary to warfarin use in the setting of an LVAD. Patient has required transfusions for GIB in the past. Mostly recently back in 2021 pt had anemia with dark stools. No interventions was done at that time. However Last Endoscopy was done in 2020 (negative). Today labs show patient is anemic with H/H of 4.5/16.3,. INR is 8.84 MAP in the 90s, Temp 35.1. He denies any chest pain, shortness of breath, dizziness, abd pain, nausea or vomiting.       (2021 16:57)    Palliative consulted for GOC    PERTINENT PM/SXH:   No pertinent past medical history    CHF (congestive heart failure)    CAD (coronary artery disease)    Depression    Pleural effusion    History of 2019 novel coronavirus disease (COVID-19)    Hemorrhoids    Bleeding hemorrhoids    Peripheral arterial disease    Claudication    BPH with urinary obstruction    ACC/AHA stage D systolic heart failure    Anticoagulation goal of INR 2.0 to 2.5    Falls    Clavicle fracture    CKD (chronic kidney disease), stage III    Iron deficiency anemia    H/O epistaxis    Vertigo    GI bleed      No significant past surgical history    S/P TVR (tricuspid valve repair)    S/P ventricular assist device    S/P endoscopy      FAMILY HISTORY:    ITEMS NOT CHECKED ARE NOT PRESENT    SOCIAL HISTORY:   Significant other/partner[ ]  Children[x ]  Scientology/Spirituality:  Substance hx:  [ ]   Tobacco hx:  [ ]   Alcohol hx: [ ]   Home Opioid hx:  [ ] I-Stop Reference No:  Living Situation: [x ]Home  [ ]Long term care  [ ]Rehab [ ]Other    ADVANCE DIRECTIVES:    DNR  MOLST  [ ]  Living Will  [ ]   DECISION MAKER(s):  [x ] Health Care Proxy(s)  [ ] Surrogate(s)  [ ] Guardian           Name(s): Phone Number(s): Alyssa Rudolph [phone]    BASELINE (I)ADL(s) (prior to admission):  [place]: [ ]Total  [x ] Moderate [ ]Dependent    Allergies    No Known Allergies    Intolerances    MEDICATIONS  (STANDING):  aMIOdarone    Tablet 200 milliGRAM(s) Oral daily  cefepime   IVPB 1000 milliGRAM(s) IV Intermittent every 8 hours  chlorhexidine 0.12% Liquid 15 milliLiter(s) Oral Mucosa every 12 hours  chlorhexidine 2% Cloths 1 Application(s) Topical <User Schedule>  clonazePAM  Tablet 0.5 milliGRAM(s) Oral at bedtime  dexMEDEtomidine Infusion 0.5 MICROgram(s)/kG/Hr (9.81 mL/Hr) IV Continuous <Continuous>  heparin   Injectable 5000 Unit(s) SubCutaneous every 8 hours  insulin lispro (ADMELOG) corrective regimen sliding scale   SubCutaneous every 4 hours  metroNIDAZOLE  IVPB 500 milliGRAM(s) IV Intermittent every 8 hours  octreotide  Injectable 50 MICROGram(s) IV Push every 8 hours  pantoprazole  Injectable 40 milliGRAM(s) IV Push every 12 hours  polyethylene glycol 3350 17 Gram(s) Oral daily  sodium chloride 0.9%. 1000 milliLiter(s) (5 mL/Hr) IV Continuous <Continuous>  vancomycin    Solution 125 milliGRAM(s) Oral every 12 hours    MEDICATIONS  (PRN):  acetaminophen    Suspension .. 650 milliGRAM(s) Oral every 6 hours PRN Mild Pain (1 - 3)  simethicone 80 milliGRAM(s) Chew every 8 hours PRN Dyspepsia    PRESENT SYMPTOMS: [ ]  Due to Covid 19 infection data obtained from nursing assessment.  Source if other than patient:  [ ]Family   [ ]Team     Pain: [ ]yes [x ]no  QOL impact -   Location -                    Aggravating factors -  Quality -  Radiation -  Timing-  Severity (0-10 scale):  Minimal acceptable level (0-10 scale):     CPOT:    https://www.sccm.org/getattachment/gne97x55-1s1r-3l9k-3z7o-2546f4799o3a/Critical-Care-Pain-Observation-Tool-(CPOT)      PAIN AD Score:     [email or web address]/cog/painad.pdf (press ctrl +  left click to view)    Dyspnea:                           [ x]Mild [ ]Moderate [ ]Severe  Anxiety:                             [ ]Mild [ ]Moderate [ ]Severe  Fatigue:                             [ ]Mild [ ]Moderate [ ]Severe  Nausea:                             [ ]Mild [ ]Moderate [ ]Severe  Loss of appetite:              [ ]Mild [ ]Moderate [ ]Severe  Constipation:                    [ ]Mild [ ]Moderate [ ]Severe    Other Symptoms:  [x ]All other review of systems negative     Palliative Performance Status Version 2:    20     %    http://[email or web address]/files/news/palliative_performance_scale_[email or web address].pdf  PHYSICAL EXAM: DUE TO COVID-19 INFECTION  physical exam findings from the clinician caring for this patient directly are used.   Vital Signs Last 24 Hrs  T(C): 37.6 (02 Aug 2021 16:00), Max: 37.6 (02 Aug 2021 00:00)  T(F): 99.6 (02 Aug 2021 16:00), Max: 99.7 (02 Aug 2021 00:00)  HR: 104 (02 Aug 2021 16:00) (63 - 112)  BP: --  BP(mean): --  RR: 34 (02 Aug 2021 16:00) (20 - 62)  SpO2: 100% (02 Aug 2021 16:00) (99% - 100%) I&O's Summary    01 Aug 2021 07:01  -  02 Aug 2021 07:00  --------------------------------------------------------  IN: 2001.1 mL / OUT: 2255 mL / NET: -253.9 mL    02 Aug 2021 07:01  -  02 Aug 2021 16:33  --------------------------------------------------------  IN: 515 mL / OUT: 620 mL / NET: -105 mL      GENERAL:  [x ]Alert  [ ]Oriented x   [ ]Lethargic  [ ]Cachexia  [ ]Unarousable  [ ]Verbal  [ ]Non-Verbal  Behavioral:   [ ] Anxiety  [ ] Delirium [ ] Agitation [ ] Other  HEENT:  [ ]Normal   [ ]Dry mouth   [x ]ET Tube/Trach  [ ]Oral lesions  PULMONARY:   [ ]Clear [ ]Tachypnea  [ ]Audible excessive secretions   [ ]Rhonchi        [ ]Right [ ]Left [ ]Bilateral  [x ]Crackles        [ ]Right [ ]Left [ ]Bilateral  [ ]Wheezing     [ ]Right [ ]Left [ ]Bilateral  [ ]Diminished breath sounds [ ]right [ ]left [ ]bilateral  CARDIOVASCULAR:    [ ]Regular [ ]Irregular [ ]Tachy  [ ]Jose [ ]Murmur [x ]Other-LVAD  GASTROINTESTINAL:  [ x]Soft  [ ]Distended   [ ]+BS  [ x]Non tender [ ]Tender  [ ]PEG [x ]OGT/ NGT  Last BM:   GENITOURINARY:  [ ]Normal [x ] Incontinent   [ ]Oliguria/Anuria   [x ]Landrum  MUSCULOSKELETAL:   [ ]Normal   [ ]Weakness  [x ]Bed/Wheelchair bound [ ]Edema  NEUROLOGIC:   [ ]No focal deficits  [ ]Cognitive impairment  [x ]Dysphagia [x ]Dysarthria [ ]Paresis [ ]Other   SKIN:   [ ]Normal    [ ]Rash  [ ]Pressure ulcer(s)       Present on admission [ ]y [ ]n    CRITICAL CARE:  [ ] Shock Present  [ ]Septic [ ]Cardiogenic [ ]Neurologic [ ]Hypovolemic  [ ]  Vasopressors [ ]  Inotropes   [x ]Respiratory failure present [x ]Mechanical ventilation [ ]Non-invasive ventilatory support [ ]High flow  Mode: AC/ CMV (Assist Control/ Continuous Mandatory Ventilation), RR (machine): 20, TV (machine): 500, FiO2: 40, PEEP: 5, ITime: 1, MAP: 10, PIP: 25   [x ]Acute  [ ]Chronic [x ]Hypoxic  [ ]Hypercarbic [ ]Other  [ ]Other organ failure     LABS:                        7.9    10.43 )-----------( 168      ( 02 Aug 2021 00:41 )             25.7   08-02    133<L>  |  101  |  12  ----------------------------<  184<H>  4.1   |  23  |  0.52    Ca    8.7      02 Aug 2021 00:41  Phos  2.8     08-02  Mg     1.7     08-02    TPro  6.2  /  Alb  2.6<L>  /  TBili  0.5  /  DBili  x   /  AST  15  /  ALT  60<H>  /  AlkPhos  169<H>    PT/INR - ( 02 Aug 2021 00:41 )   PT: 16.8 sec;   INR: 1.42 ratio         PTT - ( 02 Aug 2021 00:41 )  PTT:27.7 sec    Urinalysis Basic - ( 01 Aug 2021 12:29 )    Color: Colorless / Appearance: Clear / S.010 / pH: x  Gluc: x / Ketone: Negative  / Bili: Negative / Urobili: Negative   Blood: x / Protein: Negative / Nitrite: Negative   Leuk Esterase: Negative / RBC: 4 /hpf / WBC 1 /HPF   Sq Epi: x / Non Sq Epi: 1 /hpf / Bacteria: Negative    RADIOLOGY & ADDITIONAL STUDIES:  < from: CT Abdomen and Pelvis w/ IV Cont (21 @ 17:44) >    EXAM:  CT ABDOMEN AND PELVIS IC                          EXAM:  CT CHEST IC                            PROCEDURE DATE:  2021            INTERPRETATION:  CLINICAL INFORMATION: Sepsis.    COMPARISON: CT chest, abdomen and pelvis 2021    CONTRAST/COMPLICATIONS:  IV Contrast: Omnipaque 350  90 cc administered   10 cc discarded  Oral Contrast: None  Complications: None reported    PROCEDURE:  CT of the Chest, Abdomen and Pelvis was performed.  Sagittal and coronal reformats were performed.    FINDINGS:  CHEST:  LUNGS, PLEURA AND LARGE AIRWAYS: Tracheostomy tube noted. Secretions within the trachea. Emphysema. Trace bilateral pleural effusions with associated partial atelectasis of the lower lobes. Interval resolution of groundglass opacities in the left upper and lower lobes. A left lower lobe consolidation is noted. Interval placement of right-sided pigtail catheter with trace pneumothorax.  VESSELS: Within normal limits.  HEART: Heart size is enlarged. LVAD with partially thrombosed outflow tract as at prior CT. No evidence of fluid collection.  No pericardial effusion. Tricuspid valve replacement.  MEDIASTINUM AND DON: No lymphadenopathy.  CHEST WALL AND LOWER NECK: Sternotomy. Subcutaneous emphysema along right chest wall, postprocedural. Interval placement of right central venous catheter with tip in proximal right atrium.    ABDOMEN AND PELVIS:  LIVER: Within normal limits.  BILE DUCTS: Normal caliber.  GALLBLADDER: Within normal limits.  SPLEEN: Within normal limits.  PANCREAS: Within normal limits.  ADRENALS: Within normal limits.  KIDNEYS/URETERS: Within normal limits.    BLADDER: Landrum catheter.  REPRODUCTIVE ORGANS: Within normal limits.    BOWEL: Enteric tube with tip in stomach. No bowel obstruction.  PERITONEUM: Small volume abdominopelvic ascites, increased since 2021.  VESSELS: Vascular calcification of the aorta and its branches.  RETROPERITONEUM/LYMPH NODES: No lymphadenopathy.  ABDOMINAL WALL: Soft tissue swelling.  BONES: Degenerative changes.    IMPRESSION:    Left lower lobe consolidation representing a mix of atelectasis and infection.    No infectious etiology within the abdomen and pelvis.    --- End of Report ---      CÉSAR ANDERSEN MD; Resident Radiologist  This document has been electronically signed.  MARY SUAREZ MD; Attending Radiologist  This document has been electronically signed. 2021 11:51AM    < end of copied text >    PROTEIN CALORIE MALNUTRITION PRESENT: [ ]mild [ x]moderate [ ]severe [ ]underweight [ ]morbid obesity  https://www.andeal.org/vault/2440/web/files/ONC/Table_Clinical%20Characteristics%20to%20Document%20Malnutrition-White%20JV%20et%20al%735286.pdf    Height (cm): 177.8 (21 @ 07:47), 177.8 (21 @ 09:28), 177.8 (20 @ 22:07)  Weight (kg): 78.5 (21 @ 07:47), 74 (21 @ 10:48), 82 (20 @ 22:07)  BMI (kg/m2): 24.8 (21 @ 07:47), 23.4 (21 @ 10:48), 25.9 (21 @ 09:28)    [x ]PPSV2 < or = to 30% [ ]significant weight loss  [ ]poor nutritional intake  [ ]anasarca      [ x]Artificial Nutrition      REFERRALS:   [ ]Chaplaincy  [ ]Hospice  [ ]Child Life  [x ]Social Work  [ ]Case management [ ]Holistic Therapy     Goals of Care Document:

## 2021-08-02 NOTE — PROGRESS NOTE ADULT - ASSESSMENT
67 YO M with a history of stage D NICM s/p HM2 on 9/2017 as DT (due to severe PAD) with TV ring, prior COVID-19 infection 4/2020, recurrent syncope post LVAD s/p ILR, and chronic abdominal pain with prior negative workup who was admitted with symptomatic anemia with Hgb 4.5 in setting of INR 8.8 without hemodynamic instability. He was transfused and underwent VCE which showed active bleeding in the mid small bowel but subsequent enteroscopy 6/15 did not reveal any active bleeding. He acutely decompensated after procedure with fever/hypertension, low flow alarms, and pulmonary infiltrate with hypoxia requiring intubation from probable aspiration PNA. His LDH is normal and there are no signs of overt LVAD dysfunction on echo though signs of insufficiently unloaded ventricle for which his speed has been increased. Course notable for inability to wean ventilator with persistent secretions for which he underwent tracheostomy as well as acute c diff colitis. Worsening anemia over last few days requiring transfusion with low flow alarms, concerning for recurrent GI bleed. Now w/ hypotension and fevers c/f sepsis improved. Cxs pos for serratia s/p C tube w/ IR. Overall improving however given protracted hospitalization, will engage palliative care for GOC discussion with family.

## 2021-08-02 NOTE — PROGRESS NOTE ADULT - ASSESSMENT
Assessment and Recommendation:   · Assessment	  Assessment and recommendation :  Recurrent Acute hypoxic respiratory Failure S/P tracheostomy on full ventilatory  at 40% Fi02, /20/5 PEEP  C-PAP trial with PS of 10 CM H20 as tolerated   Acute blood loss anemia S/P multiple  blood transfusion   septic shock off vasopressin , levophed and off  Dobutamine   sepsis with serratia marcescens in the blood on cefepime  S/P cholecystostomy tube placement by IR    RT pneumothorax Pig tail cathter in place   S/P Acute left lower lobe pneumonia   Aspiration pneumonia   Stool is  +ve for C-diff. colitis on PO vancomycin  improved  Non ischemic cardiomyopathy continue ACE inhibitor and B-Blockers   S/P Septic shock and cardiogenic shock   Stage D systolic heart failure S/P LVAD HM2   MH2 LVAD  with  TV Annuloplasty  Severe peripheral vascular disease   severe hyperglycemia on insulin coverage    cardiac arrythmia  on  Amiodarone   critical care polyneuropathy   Anemia of Acute blood Loss   severe protein caloric malnutrition   S/P Small bowel bleeding   S/P blood and FFP transfusion   Chronic kidney disease stage III   NGT feeding on Jevity   PT and OT at bed side   GI prophylaxis with PPI   critical care time is 35 minutes

## 2021-08-02 NOTE — PROGRESS NOTE ADULT - ASSESSMENT
Interventional Radiology Follow-Up Note    This is a 66y Male s/p perc dawn on 7/30 in Interventional Radiology with Dr. Cole.     S: Patient seen and examined @ bedside. No complaints offered.     Medication:  aMIOdarone    Tablet: (08-02)  cefepime   IVPB: (08-02)  furosemide   Injectable: (08-01)  heparin   Injectable: (08-02)  metroNIDAZOLE  IVPB: (08-02)  vancomycin    Solution: (08-02)    Vitals:   T(F): 99.5, Max: 99.7 (00:00)  HR: 81  BP: --  RR: 20  SpO2: 100%    Physical Exam:  General: Nontoxic, in NAD.  Abdomen: soft, NTND, no peritoneal signs.  Drain Device: Drain intact attached to gravity drainage bag. Dressing clean, dry, intact.    LABS:  WBC 10.43 / Hgb 7.9 / Hct 25.7 / Plt 168  Na 133 / K 4.1 / CO2 23 / Cl 101 / BUN 12 / Cr 0.52 / Glucose 184  ALT 60 / AST 15 / Alk Phos 169 / Tbili 0.5  Ptt 27.7 / Pt 16.8 / INR 1.42      24hr Drain output: 400cc    Assessment/Plan: 65 YO M with a history of stage D NICM s/p HM2 on 9/2017 as DT (due to severe PAD) with TV ring, prior COVID-19 infection 4/2020, recurrent syncope post LVAD s/p ILR, and chronic abdominal pain with prior negative workup who was admitted with symptomatic anemia with Hgb 4.5 in setting of INR 8.8 without hemodynamic instability. He was transfused and underwent VCE which showed active bleeding in the mid small bowel but subsequent enteroscopy 6/15 did not reveal any active bleeding. He acutely decompensated after procedure with fever/hypertension, low flow alarms, and pulmonary infiltrate with hypoxia requiring intubation from probable aspiration PNA. His LDH is normal and there are no signs of overt LVAD dysfunction on echo though signs of insufficiently unloaded ventricle for which his speed has been increased. Course notable for inability to wean ventilator with persistent secretions for which he underwent tracheostomy as well as acute c diff colitis. Worsening anemia over last few days requiring transfusion with low flow alarms, concerning for recurrent GI bleed. Now w/ hypotension and fevers c/f sepsis improved. Cxs pos for serratia s/p C tube w/ IR.     -continue global management per primary team  -monitor h/h; transfuse as needed  -trend vs/labs  -flush drain with 5cc NS daily forward only; DO NOT aspirate  -change dressing q3 days or when dressing is saturated  -regarding outpatient follow up with IR, if the patient is d/c home with drainage catheter they can make an appointment with IR by calling the IR booking office at [phone]; recommend IR follow in 6 weeks for tube evaluation.  -they will benefit from VNS service to help with drainage catheter care; they should continue same drainage catheter care as an outpatient.  -d/w IR attending Dr. Cole    Please call IR at extension [phone] with any questions, concerns, or issues regarding above.      Andra Castellanos [facility]  Spectra #[phone]

## 2021-08-02 NOTE — PROGRESS NOTE ADULT - PROBLEM SELECTOR PLAN 1
- holding metoprolol in setting of sepsis   - CVP slightly elevated; goal slight negative   - Continue home amiodarone 200 mg PO daily  - currently doing well off dobutamine   - CVL in place, trend central sats - holding metoprolol in setting of sepsis   - Continue home amiodarone 200 mg PO daily  - currently doing well off dobutamine

## 2021-08-02 NOTE — PROGRESS NOTE ADULT - PROBLEM SELECTOR PLAN 7
- in s/o diarrhea, NPO, diuresis   - improved w/ addition of fluids   - trend Cr, avoid nephrotoxins -resolved

## 2021-08-02 NOTE — PROGRESS NOTE ADULT - PROBLEM SELECTOR PLAN 3
- on cefepmie/flagyl  - off pressors   - keep slight negative in s/o elevated CVP  - s/p C tube w/ IR -serratia bacteremia and poss acalculous cholecystitis   on cefepmie/flagyl  -transplant ID for duration  - PO vanc due to broad spectrum Abx -serratia bacteremia and poss acalculous cholecystitis   on cefepmie/flagyl  - d/w transplant ID for duration  - PO vanc due to broad spectrum Abx

## 2021-08-02 NOTE — PROGRESS NOTE ADULT - ATTENDING COMMENTS
Improving from an infectious standpoint now that he is s/p dawn drain and antibiotics. Still with a guarded prognosis. Will engage Palliative Care.

## 2021-08-02 NOTE — PROGRESS NOTE ADULT - SUBJECTIVE AND OBJECTIVE BOX
RICKY JOINT  MRN#: [id number]  Subjective:  Pulmonary progress  : recurrent Acute hypoxic respiratory Failure ,aspiration pneumonia, NICM  , chart reviewed and H/O obtained radiological and Laboratory study reviewed patient Examined     65M PMH ACC/AHA stage D HF due to NICM HM2 LVAD , TV annuloplasty ring 17 as destination therapy due to severe peripheral artery disease with significant stenosis  SIADH, Depression, CKD-3 with hyperkalemia, past E. coli UTIs, driveline drainage (21) and COVID-19 (back in 2020)  He was recently seen in clinic where he complained of abdominal pain and dark stools w constipation back in May. He presents to [facility] ER today weakness and fatigue, moderate and + Black stools for three days, on coumadin secondary to warfarin use in the setting of an LVAD. Patient has required transfusions for GIB in the past. Mostly recently back in 2021 pt had anemia with dark stools. No interventions was done at that time. However Last Endoscopy was done in 2020 (negative). Today labs show patient is anemic with H/H of 4.5/16.3,. INR is 8.84 MAP in the 90s, Temp 35.1. He denies any chest pain, shortness of breath, dizziness, abd pain, nausea or vomiting. found to have  rectal bleeding underwent endoscopy ,old blood in the proximal ileum ,  develop sepsis with LL opacity given Antibiotics , Extubated , reintubated , Bronchoscopy on Zosyn for LL pneumonia  and Amiodarone S/P TV Annuloplasty , patient remain intubated on full ventilatory support .S/P multiple units of blood transfusion , remain on full ventilatory support on Precedex and propofol , new central IJ line , diarrhea C diff. +ve on po vancomycin and IV Flagyl,  mildly distended belly , fever start on cefepime 2gm q 8 hrs S/P tracheostomy .  new RT Subclavian central line continue on contact  isolation ,still diarrhea on C-diff antibiotics ENT follow up appreciated , trial of C-PAP as tolerated , , copious secretion from trach. site chest x ray left lower lobe pneumonia , tolerating trch. collar 50% FI02 still excessive secretion need pulmonary toilet , off Ancef antibiotic , no more diarrhea back on full support mechanical ventilator , chest x ray show improvement in LLL air space disease, more awake and responsive on tube feeding no more diarrhea , S/P  Ancef for bacteremia no fever ,ID follow up noted ,  no nausea or vomiting or diarrhea still very weak and tired , event note pulled NG tube now replaced , back on tube feeding ,still on po vancomycin , getting PT and OT at bed side , no plan for decannulation for now. , no more diarrhea receiving PT and OPT at bed side , minimal secretion from tracheostomy site , no SOB getting stronger , improve muscle tone patient transfer to monitor bed still on contact isolation for C-Difficel colitis on 50% FI02, NG tube clogged , and change to resume tube feeding still loose stool . H/H drop significantly require blood transfusion , most likely GI bleeding , IV heparin D/C , vomiting 200 cc of creamy color tube feeding on hold no sob, still melena monitor in the CTU possible capsule endoscopy , H/H is stable ., patient develop TR sided  pneumothorax require chest tube placement , RT IJ central line  placed , develop fever shaking chills , blood culture positive for serratia marcescens , start on cefepime .the patient  become hypoxic and hypotensive placed on full ventilatory support and Vasopressin , levophed and Dobutamine ,S/P blood transfusion on meropenem and vancomycin , IR note appreciated   , on and  off pressors , occasional agitation on Precedex .S/P IR cholecystostomy tube drainage placement in the RT upper Quadrant , resume anticoagulation chest x ray noted C-PAP trail lasted only for 2 hrs , new RT SC line and D/C RT IJ line         (2021 16:57)    PAST MEDICAL & SURGICAL HISTORY:  CHF (congestive heart failure)    CAD (coronary artery disease)  Depression    Pleural effusion    History of 2019 novel coronavirus disease (COVID-19)  2020    Hemorrhoids    Bleeding hemorrhoids    Peripheral arterial disease    Claudication    BPH with urinary obstruction    ACC/AHA stage D systolic heart failure  Anticoagulation goal of INR 2.0 to 2.5    Falls    Clavicle fracture    CKD (chronic kidney disease), stage III    Iron deficiency anemia    H/O epistaxis    Vertigo    GI bleed    S/P TVR (tricuspid valve repair)    S/P ventricular assist device    S/P endoscopy    OBJECTIVE:  ICU Vital Signs Last 24 Hrs  T(C): 38.2 (2021 10:00), Max: 38.5 (2021 12:00)  T(F): 100.8 (2021 10:00), Max: 101.3 (2021 12:00)  HR: 65 (2021 10:00) (61 - 69)  BP: --  BP(mean): --  ABP: 105/67 (2021 10:00) (90/54 - 113/64)  ABP(mean): 77 (2021 10:00) (63 - 77)  RR: 20 (2021 10:00) (19 - 35)  SpO2: 99% (2021 10:00) (96% - 100%)       @ 07:  -   @ 07:00  --------------------------------------------------------  IN: 2693.9 mL / OUT: 1415 mL / NET: 1278.9 mL     @ 07: @ 10:49  --------------------------------------------------------  IN: 420.8 mL / OUT: 115 mL / NET: 305.8 mL  PHYSICAL EXAM:Daily   Elderly male S/P tracheostomy   on  full ventilatory support on  40% FI02  RR/20/550 VT lethargic on Precedex , off pressor    Daily Weight in k.4 (2021 00:00)  HEENT:     + NCAT  + EOMI  - Conjuctival edema   - Icterus   - Thrush   - ETT  + NGT/OGT  Neck:         + FROM  RT SCl line JVD  - Nodes - Masses + Mid-line trachea + Tracheostomy  Chest:            RT pig tail cathter in place   Lungs:          + CTA   + Rhonchi    - Rales    - Wheezing + Decreased  LT BS   - Dullness R L  Cardiac:       + S1 + S2    + RRR   - Irregular   - S3  - S4    - Murmurs   - Rub   - Hamman’s sign   Abdomen:    + BS  + Soft + Non-tender - Distended - Organomegaly - PEG .cholecystostomy tube in place  Extremities:   - Cyanosis U/L   - Clubbing  U/L  + LE/UE Edema   + Capillary refill    + Pulses   Neuro:        - Awake   -  Alert   - Confused   - Lethargic   + Sedated  + Generalized Weakness  Skin:        - Rashes    - Erythema   + Normal incisions   + IV sites intact          HOSPITAL MEDICATIONS: All medications reviewed and analyzed  MEDICATIONS  (STANDING):  amiodarone    Tablet 200 milliGRAM(s) Oral daily  chlorhexidine 0.12% Liquid 15 milliLiter(s) Oral Mucosa every 12 hours  chlorhexidine 2% Cloths 1 Application(s) Topical <User Schedule>  dexmedetomidine Infusion 0.5 MICROgram(s)/kG/Hr (9.81 mL/Hr) IV Continuous <Continuous>  dextrose 50% Injectable 50 milliLiter(s) IV Push every 15 minutes  heparin  Infusion 400 Unit(s)/Hr (12.5 mL/Hr) IV Continuous <Continuous>  Hydromorphone  Injectable 0.5 milliGRAM(s) IV Push once  insulin lispro (ADMELOG) corrective regimen sliding scale   SubCutaneous every 6 hours  pantoprazole  Injectable 40 milliGRAM(s) IV Push every 12 hours  piperacillin/tazobactam IVPB.. 3.375 Gram(s) IV Intermittent every 8 hours  propofol Infusion 20 MICROgram(s)/kG/Min (9.42 mL/Hr) IV Continuous <Continuous>  sodium chloride 0.9% lock flush 3 milliLiter(s) IV Push every 8 hours  sodium chloride 0.9%. 1000 milliLiter(s) (10 mL/Hr) IV Continuous <Continuous>    MEDICATIONS  (PRN):  acetaminophen    Suspension .. 650 milliGRAM(s) Oral every 6 hours PRN Temp greater or equal to 38C (100.4F)    LABS: All Lab data reviewed and analyzed                        7.9    10.43 )-----------( 168      ( 02 Aug 2021 00:41 )             25.7   08-02    133<L>  |  101  |  12  ----------------------------<  184<H>  4.1   |  23  |  0.52    Ca    8.7      02 Aug 2021 00:41  Phos  2.8     08-02  Mg     1.7     08-02    TPro  6.2  /  Alb  2.6<L>  /  TBili  0.5  /  DBili  x   /  AST  15  /  ALT  60<H>  /  AlkPhos  169<H>      Ca    8.8      01 Aug 2021 00:32  Phos  2.9       Mg     1.8         TPro  6.6  /  Alb  3.0<L>  /  TBili  0.7  /  DBili  x   /  AST  21  /  ALT  93<H>  /  AlkPhos  181<H>      Ca    8.6      2021 00:25  Phos  3.2       Mg     2.0         TPro  6.2  /  Alb  2.7<L>  /  TBili  1.2  /  DBili  x   /  AST  43<H>  /  ALT  131<H>  /  AlkPhos  194<H>      Ca    9.1      2021 00:58  Phos  1.5       Mg     1.9         TPro  6.4  /  Alb  3.0<L>  /  TBili  1.3<H>  /  DBili  x   /  AST  117<H>  /  ALT  210<H>  /  AlkPhos  224<H>                 24.1   Ca    8.4      2021 00:28  Phos  0.9       Mg     2.2         TPro  6.0  /  Alb  2.9<L>  /  TBili  0.9  /  DBili  x   /  AST  209<H>  /  ALT  237<H>  /  AlkPhos  171<H>                                                                  PTT - ( 2021 04:52 )  PTT:45.2 sec LIVER FUNCTIONS - ( 2021 00:42 )  Alb: 3.4 g/dL / Pro: 6.7 g/dL / ALK PHOS: 213 U/L / ALT: 15 U/L / AST: 24 U/L / GGT: x           RADIOLOGY: - Reviewed and analyzed RT Pig tail cathter  , LVAD HM2, CT scan of abdomen reviewed result noted

## 2021-08-02 NOTE — CONSULT NOTE ADULT - ASSESSMENT
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 coli UTIs, driveline drainage (1/7/21) and COVID-19 (back in April 2020). Patient admitted to CTU due to GIB and shock, palliative consulted for GOC

## 2021-08-02 NOTE — PROGRESS NOTE ADULT - PROBLEM SELECTOR PLAN 2
- Current speed 9200 RPM  - HOLD coumadin given recurrent bleed  - Will plan to hold aspirin indefinitely given recurrent GIB.  - Continue to monitor LDH daily - Current speed 9200 RPM  - HOLD coumadin given recurrent bleed. Will hold indefinitely.   - Will plan to hold aspirin indefinitely given recurrent GIB.  - Continue to monitor LDH daily

## 2021-08-02 NOTE — PROGRESS NOTE ADULT - SUBJECTIVE AND OBJECTIVE BOX
Kiana Wheat MD  Cardiology Fellow  [phone]  All Cardiology service information can be found 24/7 on [email or web address], password: [email or web address]    Patient seen and examined at bedside.    Overnight Events:     Review Of Systems: No chest pain, shortness of breath, or palpitations            Current Meds:  acetaminophen    Suspension .. 650 milliGRAM(s) Oral every 6 hours PRN  aMIOdarone    Tablet 200 milliGRAM(s) Oral daily  cefepime   IVPB 1000 milliGRAM(s) IV Intermittent every 8 hours  chlorhexidine 0.12% Liquid 15 milliLiter(s) Oral Mucosa every 12 hours  chlorhexidine 2% Cloths 1 Application(s) Topical <User Schedule>  clonazePAM  Tablet 0.5 milliGRAM(s) Oral at bedtime  dexMEDEtomidine Infusion 0.5 MICROgram(s)/kG/Hr IV Continuous <Continuous>  heparin   Injectable 5000 Unit(s) SubCutaneous every 8 hours  insulin lispro (ADMELOG) corrective regimen sliding scale   SubCutaneous every 4 hours  metroNIDAZOLE  IVPB 500 milliGRAM(s) IV Intermittent every 8 hours  octreotide  Injectable 50 MICROGram(s) IV Push every 8 hours  pantoprazole  Injectable 40 milliGRAM(s) IV Push every 12 hours  polyethylene glycol 3350 17 Gram(s) Oral daily  simethicone 80 milliGRAM(s) Chew every 8 hours PRN  sodium chloride 0.9%. 1000 milliLiter(s) IV Continuous <Continuous>  vancomycin    Solution 125 milliGRAM(s) Oral every 12 hours      Vitals:  T(F): 99.5 (08-02), Max: 99.7 (08-02)  HR: 80 (08-02) (63 - 112)  BP: --  RR: 20 (08-02)  SpO2: 100% (08-02)  I&O's Summary    01 Aug 2021 07:01  -  02 Aug 2021 07:00  --------------------------------------------------------  IN: 2001.1 mL / OUT: 2255 mL / NET: -253.9 mL    02 Aug 2021 07:01  -  02 Aug 2021 09:44  --------------------------------------------------------  IN: 180 mL / OUT: 85 mL / NET: 95 mL        Physical Exam:  Appearance: No acute distress; well appearing  Eyes: PERRL, EOMI, pink conjunctiva  HEENT: Normal oral mucosa  Cardiovascular: RRR, S1, S2, no murmurs, rubs, or gallops; no edema; no JVD  Respiratory: Clear to auscultation bilaterally  Gastrointestinal: soft, non-tender, non-distended with normal bowel sounds  Musculoskeletal: No clubbing; no joint deformity   Neurologic: Non-focal  Lymphatic: No lymphadenopathy  Psychiatry: AAOx3, mood & affect appropriate  Skin: No rashes, ecchymoses, or cyanosis                          7.9    10.43 )-----------( 168      ( 02 Aug 2021 00:41 )             25.7     08-02    133<L>  |  101  |  12  ----------------------------<  184<H>  4.1   |  23  |  0.52    Ca    8.7      02 Aug 2021 00:41  Phos  2.8     08-02  Mg     1.7     08-02    TPro  6.2  /  Alb  2.6<L>  /  TBili  0.5  /  DBili  x   /  AST  15  /  ALT  60<H>  /  AlkPhos  169<H>  08-02    PT/INR - ( 02 Aug 2021 00:41 )   PT: 16.8 sec;   INR: 1.42 ratio         PTT - ( 02 Aug 2021 00:41 )  PTT:27.7 sec              New ECG(s): Personally reviewed    Echo:    Stress Testing:     Cath:    Imaging:    Interpretation of Telemetry:    Kiana Wheat MD  Cardiology Fellow  [phone]  All Cardiology service information can be found 24/7 on [email or web address], password: [email or web address]    Patient seen and examined at bedside.    Overnight Events: remains HDS, hgb stable, no events.     Review Of Systems: No chest pain, shortness of breath, or palpitations            Current Meds:  acetaminophen    Suspension .. 650 milliGRAM(s) Oral every 6 hours PRN  aMIOdarone    Tablet 200 milliGRAM(s) Oral daily  cefepime   IVPB 1000 milliGRAM(s) IV Intermittent every 8 hours  chlorhexidine 0.12% Liquid 15 milliLiter(s) Oral Mucosa every 12 hours  chlorhexidine 2% Cloths 1 Application(s) Topical <User Schedule>  clonazePAM  Tablet 0.5 milliGRAM(s) Oral at bedtime  dexMEDEtomidine Infusion 0.5 MICROgram(s)/kG/Hr IV Continuous <Continuous>  heparin   Injectable 5000 Unit(s) SubCutaneous every 8 hours  insulin lispro (ADMELOG) corrective regimen sliding scale   SubCutaneous every 4 hours  metroNIDAZOLE  IVPB 500 milliGRAM(s) IV Intermittent every 8 hours  octreotide  Injectable 50 MICROGram(s) IV Push every 8 hours  pantoprazole  Injectable 40 milliGRAM(s) IV Push every 12 hours  polyethylene glycol 3350 17 Gram(s) Oral daily  simethicone 80 milliGRAM(s) Chew every 8 hours PRN  sodium chloride 0.9%. 1000 milliLiter(s) IV Continuous <Continuous>  vancomycin    Solution 125 milliGRAM(s) Oral every 12 hours      Vitals:  T(F): 99.5 (08-02), Max: 99.7 (08-02)  HR: 80 (08-02) (63 - 112)  BP: --  RR: 20 (08-02)  SpO2: 100% (08-02)  I&O's Summary    01 Aug 2021 07:01  -  02 Aug 2021 07:00  --------------------------------------------------------  IN: 2001.1 mL / OUT: 2255 mL / NET: -253.9 mL    02 Aug 2021 07:01  -  02 Aug 2021 09:44  --------------------------------------------------------  IN: 180 mL / OUT: 85 mL / NET: 95 mL        Physical Exam:  Gen: laying in bed, responding to questions, denies any pain  HEENT: EOMI, MMM  Cardio: S1 S2 no murmurs, rubs or gallops  Chest: CTAB  Abd: soft nontender to palpation, bS+  Extremities: no edema                        7.9    10.43 )-----------( 168      ( 02 Aug 2021 00:41 )             25.7     08-02    133<L>  |  101  |  12  ----------------------------<  184<H>  4.1   |  23  |  0.52    Ca    8.7      02 Aug 2021 00:41  Phos  2.8     08-02  Mg     1.7     08-02    TPro  6.2  /  Alb  2.6<L>  /  TBili  0.5  /  DBili  x   /  AST  15  /  ALT  60<H>  /  AlkPhos  169<H>  08-02    PT/INR - ( 02 Aug 2021 00:41 )   PT: 16.8 sec;   INR: 1.42 ratio         PTT - ( 02 Aug 2021 00:41 )  PTT:27.7 sec

## 2021-08-03 NOTE — PROGRESS NOTE ADULT - ASSESSMENT
Assessment and Recommendation:   · Assessment	  Assessment and recommendation :  Recurrent Acute hypoxic respiratory Failure S/P tracheostomy on full ventilatory  at 40% Fi02, /20/5 PEEP  C-PAP trial with PS of 10 CM H20 as tolerated   Acute blood loss anemia S/P multiple  blood transfusion   septic shock off vasopressin , levophed and off  Dobutamine   sepsis with serratia marcescens in the blood on cefepime  S/P cholecystostomy tube placement by IR    RT pneumothorax S/P  Pig tail cathter placement and D/C   S/P Acute left lower lobe pneumonia   Aspiration pneumonia   Stool is  +ve for C-diff. colitis on PO vancomycin  improved  Non ischemic cardiomyopathy continue ACE inhibitor and B-Blockers   S/P Septic shock and cardiogenic shock   Stage D systolic heart failure S/P LVAD HM2   MH2 LVAD  with  TV Annuloplasty  Severe peripheral vascular disease   severe hyperglycemia on insulin coverage    cardiac arrythmia  on  Amiodarone   critical care polyneuropathy   Anemia of Acute blood Loss   severe protein caloric malnutrition   S/P Small bowel bleeding   S/P blood and FFP transfusion   Chronic kidney disease stage III   NGT feeding on Jevity   PT and OT at bed side   GI prophylaxis with PPI

## 2021-08-03 NOTE — PROGRESS NOTE ADULT - PROBLEM SELECTOR PLAN 2
- Current speed 9200 RPM  - HOLD coumadin given recurrent bleed. Will hold indefinitely.   - Will plan to hold aspirin indefinitely given recurrent GIB.  - Continue to monitor LDH daily  -appreciate palliative care consult - Current speed 9200 RPM  - HOLD coumadin given recurrent bleed. Will hold indefinitely.   - Will plan to hold aspirin indefinitely given recurrent GIB.  - Continue to monitor LDH daily as he is off anticoagulation  - appreciate palliative care consult

## 2021-08-03 NOTE — PROGRESS NOTE ADULT - ATTENDING COMMENTS
Slowly progressing. Resuming tube feeds today. Consider ENT eval given persistent cough. No antiplatelet/anticoagulation. Continue Octreotide. LDH daily.

## 2021-08-03 NOTE — PROGRESS NOTE ADULT - SUBJECTIVE AND OBJECTIVE BOX
ENT ISSUE/POD: Persistent coughing fits    HPI: 65M PMH ACC/AHA stage D HF due to NICM HM2 LVAD , TV annuloplasty ring 9/12/17 as destination therapy due to severe peripheral artery disease with significant stenosis  SIADH, Depression, CKD-3 with hyperkalemia, past E. coli UTIs, driveline drainage (1/7/21) and COVID-19 (back in April 2020). Pt. was admitted  with melena and anemia.  Pt. required intubation for aspiration pneumonia and was unable to be weaned from ventillator requiring a tracheostomy on 6/25. ENT was called to re-evaluate pt. for" persistent coughing fits". Pt. was examined in bed resting comfortably with no evidence of coughing, sating 100%, trach stoma site appeared clean and dry with no erythema.        PAST MEDICAL & SURGICAL HISTORY:  CHF (congestive heart failure)    CAD (coronary artery disease)    Depression    Pleural effusion    History of 2019 novel coronavirus disease (COVID-19)  april 2020    Hemorrhoids    Bleeding hemorrhoids    Peripheral arterial disease    Claudication    BPH with urinary obstruction    ACC/AHA stage D systolic heart failure    Anticoagulation goal of INR 2.0 to 2.5    Falls    Clavicle fracture    CKD (chronic kidney disease), stage III    Iron deficiency anemia    H/O epistaxis    Vertigo    GI bleed    S/P TVR (tricuspid valve repair)    S/P ventricular assist device    S/P endoscopy      Allergies    No Known Allergies    Intolerances      MEDICATIONS  (STANDING):  aMIOdarone    Tablet 200 milliGRAM(s) Oral daily  cefepime   IVPB 1000 milliGRAM(s) IV Intermittent every 8 hours  chlorhexidine 0.12% Liquid 15 milliLiter(s) Oral Mucosa every 12 hours  chlorhexidine 2% Cloths 1 Application(s) Topical <User Schedule>  clonazePAM  Tablet 0.5 milliGRAM(s) Oral at bedtime  dexMEDEtomidine Infusion 0.5 MICROgram(s)/kG/Hr (9.81 mL/Hr) IV Continuous <Continuous>  heparin   Injectable 5000 Unit(s) SubCutaneous every 8 hours  insulin lispro (ADMELOG) corrective regimen sliding scale   SubCutaneous every 4 hours  metroNIDAZOLE  IVPB 500 milliGRAM(s) IV Intermittent every 8 hours  octreotide  Injectable 50 MICROGram(s) IV Push every 8 hours  ondansetron Injectable 4 milliGRAM(s) IV Push once  pantoprazole  Injectable 40 milliGRAM(s) IV Push every 12 hours  polyethylene glycol 3350 17 Gram(s) Oral daily  sodium chloride 0.9%. 1000 milliLiter(s) (5 mL/Hr) IV Continuous <Continuous>  vancomycin    Solution 125 milliGRAM(s) Oral every 12 hours    MEDICATIONS  (PRN):  acetaminophen    Suspension .. 650 milliGRAM(s) Oral every 6 hours PRN Mild Pain (1 - 3)  simethicone 80 milliGRAM(s) Chew every 8 hours PRN Dyspepsia      Social History: see consult note    Family history: see consult    ROS:   ENT: all negative except as noted in HPI   Pulm: denies SOB, cough, hemoptysis  Neuro: denies numbness/tingling, loss of sensation  Endo: denies heat/cold intolerance, excessive sweating      Vital Signs Last 24 Hrs  T(C): 36.6 (03 Aug 2021 17:00), Max: 37.6 (02 Aug 2021 20:00)  T(F): 97.9 (03 Aug 2021 17:00), Max: 99.6 (02 Aug 2021 20:00)  HR: 92 (03 Aug 2021 17:00) (61 - 105)  BP: --  BP(mean): --  RR: 23 (03 Aug 2021 17:00) (20 - 100)  SpO2: 100% (03 Aug 2021 17:00) (97% - 100%)                          7.9    10.92 )-----------( 191      ( 03 Aug 2021 01:03 )             26.1    08-03    131<L>  |  99  |  11  ----------------------------<  133<H>  4.2   |  24  |  0.56    Ca    8.8      03 Aug 2021 01:01  Phos  2.6     08-03  Mg     1.8     08-03    TPro  6.5  /  Alb  3.0<L>  /  TBili  0.5  /  DBili  x   /  AST  17  /  ALT  47<H>  /  AlkPhos  171<H>  08-03   PT/INR - ( 02 Aug 2021 00:41 )   PT: 16.8 sec;   INR: 1.42 ratio         PTT - ( 02 Aug 2021 00:41 )  PTT:27.7 sec    PHYSICAL EXAM:  Gen: on vent  Skin: No rashes, bruises, or lesions  Head: Normocephalic, Atraumatic  Face: no edema, erythema, or fluctuance. Parotid glands soft without mass  Eyes: no scleral injection  Nose: Nares bilaterally patent, no discharge  Mouth: No Stridor / Drooling / Trismus.  Mucosa moist, tongue/uvula midline, oropharynx clear  Neck: [facility] DCT #6 cuffed trach, stoma clean and dry with no erythema, supple, no lymphadenopathy, trachea midline, no masses  Lymphatic: No lymphadenopathy  Resp: on vent   Neuro: facial nerve intact, no facial droop      Tracheoscopy (scope #2 used):  Scope advanced through trach tube. No blood noted along trachea.. Trach tube in proper position, centered in trachea. Carinal bifurcation visualized.  Copious secretions noted.

## 2021-08-03 NOTE — PROGRESS NOTE ADULT - PROBLEM SELECTOR PLAN 5
-s/p trach 6/25, now back on vent in setting of sepsis and pneumothorax  -chest tube in place, management per CTU team -s/p trach 6/25, now back on vent in setting of sepsis and pneumothorax. Wean as tolerated.   -chest tube in place, management per CTU team

## 2021-08-03 NOTE — PROGRESS NOTE ADULT - PROBLEM SELECTOR PLAN 3
-serratia bacteremia and poss acalculous cholecystitis   on cefepmie/flagyl  - d/w transplant ID for duration  - PO vanc due to broad spectrum Abx

## 2021-08-03 NOTE — PROGRESS NOTE ADULT - SUBJECTIVE AND OBJECTIVE BOX
INFECTIOUS DISEASES FOLLOW UP-- Anitra Prater  [phone]    This is a follow up note for this  66yMale with  Anemia    Acute cholecystitis        ROS:  CONSTITUTIONAL:  No fever, good appetite  CARDIOVASCULAR:  No chest pain or palpitations  RESPIRATORY:  No dyspnea  GASTROINTESTINAL:  No nausea, vomiting, diarrhea, or abdominal pain  GENITOURINARY:  No dysuria  NEUROLOGIC:  No headache,     Allergies    No Known Allergies    Intolerances        ANTIBIOTICS/RELEVANT:  antimicrobials  cefepime   IVPB 1000 milliGRAM(s) IV Intermittent every 8 hours  metroNIDAZOLE  IVPB 500 milliGRAM(s) IV Intermittent every 8 hours  vancomycin    Solution 125 milliGRAM(s) Oral every 12 hours    immunologic:    OTHER:  acetaminophen    Suspension .. 650 milliGRAM(s) Oral every 6 hours PRN  aMIOdarone    Tablet 200 milliGRAM(s) Oral daily  chlorhexidine 0.12% Liquid 15 milliLiter(s) Oral Mucosa every 12 hours  chlorhexidine 2% Cloths 1 Application(s) Topical <User Schedule>  clonazePAM  Tablet 0.5 milliGRAM(s) Oral at bedtime  dexMEDEtomidine Infusion 0.5 MICROgram(s)/kG/Hr IV Continuous <Continuous>  heparin   Injectable 5000 Unit(s) SubCutaneous every 8 hours  insulin lispro (ADMELOG) corrective regimen sliding scale   SubCutaneous every 4 hours  octreotide  Injectable 50 MICROGram(s) IV Push every 8 hours  pantoprazole  Injectable 40 milliGRAM(s) IV Push every 12 hours  polyethylene glycol 3350 17 Gram(s) Oral daily  simethicone 80 milliGRAM(s) Chew every 8 hours PRN  sodium chloride 0.9%. 1000 milliLiter(s) IV Continuous <Continuous>      Objective:  Vital Signs Last 24 Hrs  T(C): 36.6 (03 Aug 2021 17:00), Max: 37.6 (02 Aug 2021 20:00)  T(F): 97.9 (03 Aug 2021 17:00), Max: 99.6 (02 Aug 2021 20:00)  HR: 106 (03 Aug 2021 19:00) (61 - 127)  BP: --  BP(mean): --  RR: 28 (03 Aug 2021 19:00) (20 - 100)  SpO2: 100% (03 Aug 2021 19:00) (97% - 100%)    PHYSICAL EXAM:  Constitutional:no acute distress  Eyes:ALONSO, EOMI  Ear/Nose/Throat: no oral lesions, 	  Respiratory: clear BL  Cardiovascular: S1S2  Gastrointestinal:soft, (+) BS, no tenderness  Extremities:no e/e/c  No Lymphadenopathy  IV sites not inflammed.    LABS:                        7.9    10.92 )-----------( 191      ( 03 Aug 2021 01:03 )             26.1     08-03    131<L>  |  99  |  11  ----------------------------<  133<H>  4.2   |  24  |  0.56    Ca    8.8      03 Aug 2021 01:01  Phos  2.6     08-03  Mg     1.8     08-03    TPro  6.5  /  Alb  3.0<L>  /  TBili  0.5  /  DBili  x   /  AST  17  /  ALT  47<H>  /  AlkPhos  171<H>  08-03    PT/INR - ( 02 Aug 2021 00:41 )   PT: 16.8 sec;   INR: 1.42 ratio         PTT - ( 02 Aug 2021 00:41 )  PTT:27.7 sec      MICROBIOLOGY:            RECENT CULTURES:  08-01 @ 17:37  .Sputum Sputum  Stenotrophomonas maltophilia  Stenotrophomonas maltophilia  CLAU    Moderate Stenotrophomonas maltophilia  Normal Respiratory Bria absent  --  08-01 @ 17:14  .Blood Blood-Peripheral  --  --  --    No growth to date.  --  07-31 @ 20:18  .Blood Blood  --  --  --    No growth to date.  --  07-30 @ 12:00  .Body Fluid Bile Fluid  --  --  --    No growth  --  07-29 @ 03:37  .Blood Blood-Peripheral  --  --  --    No Growth Final  --  07-28 @ 10:19  .Blood Blood-Peripheral  --  --  --    No Growth Final  --      RADIOLOGY & ADDITIONAL STUDIES:

## 2021-08-03 NOTE — PROGRESS NOTE ADULT - ASSESSMENT
66 years old male with a history of stage D NICM s/p HM2 on 9/2017 as DT (due to severe PAD) with TV ring, prior COVID-19 infection 4/2020, recurrent syncope post LVAD s/p ILR, admitted on 6/14/21with symptomatic anemia with Hgb 4.5 in setting of INR 8.8 without hemodynamic instability. He was transfused and underwent VCE which showed active bleeding in the mid small bowel but subsequent enteroscopy 6/15 did not reveal any active bleeding. He acutely decompensated after procedure with fever/hypertension, low flow alarms, and pulmonary infiltrate with hypoxia requiring intubation from probable aspiration PNA. Course notable for inability to wean ventilator with persistent secretions for which he underwent tracheostomy. He developed C. diff colitis and is on vancomycin taper. He is currently vent dependent now and requiring pressor support. BCx showed serratia on 7/26, which correlates to his sputum culture on 7/7 and 7/27. The source is most likely from pneumonia or cholecystitis. Transaminitis could be secondary to acalculous cholecystitis. Low suspicion for device infection (gram negative organism) due to quick blood culture clearance.      IMPRESSION:  Septic shock secondary to high grade Serratia bacteremia, secondary to pneumonia vs cholecystitis  Tension pneumothorax s/p right chest tube 7/26  Transaminitis, cholestatic pattern, possibly acalculous cholecystitis s/p perc dawn tube 7/30  C. diff colitis on PO vancomycin taper  ICM s/p LVAD  BCx serratia 7/26, NGTD 7/27, 7/28, 7/29  Sputum culture serratia 7/7, serratia and stenotrophomonas 7/27  CT consolidation on left lower lobe, improved from CT in June 2021  No cholangitis  No signs of drive line infection  Clinically improving      RECOMMENDATIONS:  - Continue with IV cefepime 1gm q8hrs and IV Flagyl 500mg q8hrs- will plan to complete 10-14 days  - Patient is improving on current abx regimen and CT chest also improved; will hold off on treating Stenotrophomonas for now unless he deteriorates on current treatment or with signs of worsening PNA  - Continue PO vancomycin 125mg q12hrs while on board-spectrum antibiotics  - Bile fluid no growth    Morris Prater MD  [phone]  After 5pm/weekends [phone]

## 2021-08-03 NOTE — CHART NOTE - NSCHARTNOTEFT_GEN_A_CORE
Seen for: nutrition follow up   Source: EMR, RN, HF rounds    Chart reviewed, events noted. Per chart: 65 year old male with PMHx of NICM, s/p  HM2 LVAD in , on coumadin, CKD 3 presents with anemia, dark stools and supra therapeutic INR. S/p capsule study, endoscopy. Course c/b possible aspiration event. C-Diff +, being treated with vancomycin. S/p tracheostomy on . On  had episode of melena associated with Hb drop, GI consulted. Plan for possible endoscopically placed capsule study as condition allows. Blood cultures positive for SM, required pressor support on . On , US showed GB wall thickening with surrounding pericholecystic fluid, plan for percutaneous cholecystostomy with IR. Back on full vent support    Diet, NPO with Tube Feed:   Tube Feeding Modality: Nasogastric  Jevity 1.2 Tank (JEVITY1.2RTH)  Total Volume for 24 Hours (mL): 1440  Continuous  Starting Tube Feed Rate {mL per Hour}: 10  Increase Tube Feed Rate by (mL): 10     Every 6 hours  Until Goal Tube Feed Rate (mL per Hour): 60  Tube Feed Duration (in Hours): 24  Tube Feed Start Time: 11:00  No Carb Prosource TF     Qty per Day:  2  Supplement Feeding Modality:  Nasogastric  Probiotic Yogurt/Smoothie Cans or Servings Per Day:  2       Frequency:  Daily (21 @ 15:25)    EN Order Provides: 1728 kcal, 80 g protein   (meeting 22 kcal/kg, 1.02 g/kg protein using dosing weight 78.5 kg)    EN provision per chart:   (8/3) 13% of goal, resumed at 30ml/hr  () 83% of goal  () 92% of goal  () 65% of goal, met goal rate this day  () 11% of goal, trickle feeds reinitiated    GI:    - Had episode "emesis" overnight for which tube feeds were stopped. During rounds, emesis was reported to be more of a coughing incident with secretions. Plan to resume tube feeds at 50% and increase slowly back to goal rate 60ml/hr  - C. Diff + (); remains on vancomycin   - Transaminitis improving, s/p perc dawn , 265ml output last 24hr  - Triglycerides, Serum: 679 mg/dL (21 @ 13:17)  - On octreotide   - Last BM , x4    Weight history:   - Admission weight: 176.3 pounds / 80 kg (6/15)  - Recent standing weight: 148.1 pounds / 67.2 kg ()     ** 28.2 pound/16% weight loss in 1 month    Weights:   Daily Weight in k.4 (), Weight in k.9 (), Weight in k (), Weight in k.1 (), Weight in k.8 (), Weight in k ()    MEDICATIONS  (STANDING):  aMIOdarone    Tablet  cefepime   IVPB  insulin lispro (ADMELOG) corrective regimen sliding scale  metroNIDAZOLE  IVPB  octreotide  Injectable  pantoprazole  Injectable  polyethylene glycol 3350  sodium chloride 0.9%.  vancomycin    Solution    Pertinent Labs:  @ 01:01: Na 131<L>, BUN 11, Cr 0.56, <H>, K+ 4.2, Phos 2.6, Mg 1.8, Alk Phos 171<H>, ALT/SGPT 47<H>, AST/SGOT 17, HbA1c --    A1C with Estimated Average Glucose Result: 5.6 % (06-15-21 @ 02:42)    Finger Sticks:  POCT Blood Glucose.: 101 mg/dL ( @ 10:05)  POCT Blood Glucose.: 131 mg/dL ( @ 05:52)  POCT Blood Glucose.: 137 mg/dL ( @ 03:25)  POCT Blood Glucose.: 162 mg/dL ( @ 22:52)  POCT Blood Glucose.: 113 mg/dL ( @ 17:30)  POCT Blood Glucose.: 132 mg/dL ( @ 14:20)    Triglycerides, Serum: 679 mg/dL (21 @ 13:17)    Skin per chart: no pressure injuries  Edema per chart: 2+ generalized    Estimated Nutrition Needs:   - Using dosing weight 78.5 kg, considering vent support  - Energy Needs (25-30 kcal/kg): 3814-9925 kcal/day  - Protein Needs (1.2-1.4 g/kg):     Previous Nutrition Diagnosis: Severe chronic malnutrition  Nutrition diagnosis is ongoing & addressed with tube feeds as tolerated    No new nutrition diagnosis at this time       Recommendations:      1) As tolerated, add No Carb Prosource 2x/day to meet 1.3 g/kg protein/day - continue Jevity 1.2 @ 60ml/hr x24hr to meet estimated nutrient needs    Continue monitoring and evaluating:   - Labs: blood glucose, electrolytes, renal indices  - GI function and bowel movements  - Nutritional intake, weight trends, skin integrity    RD remains available PRN and will follow up per protocol.   Kandice Peña RD, CDN, [facility]   Pager # [phone]

## 2021-08-03 NOTE — PROGRESS NOTE ADULT - PROBLEM SELECTOR PLAN 4
- No active bleeding seen on past enteroscopy  - Hgb stable for now, will hold AC/antiplatelets indefinitely  - Continue PPI  - transfuse PRN  - appreciate GI recs  - on octreotide 50 IV q8h

## 2021-08-03 NOTE — PROGRESS NOTE ADULT - PROBLEM SELECTOR PLAN 1
- holding metoprolol in setting of sepsis   - Continue home amiodarone 200 mg PO daily  - currently doing well off dobutamine

## 2021-08-03 NOTE — PROGRESS NOTE ADULT - SUBJECTIVE AND OBJECTIVE BOX
RICKY JOINT  MRN#: [id number]  Subjective:  Pulmonary progress  : recurrent Acute hypoxic respiratory Failure ,aspiration pneumonia, NICM  , chart reviewed and H/O obtained radiological and Laboratory study reviewed patient Examined     65M PMH ACC/AHA stage D HF due to NICM HM2 LVAD , TV annuloplasty ring 17 as destination therapy due to severe peripheral artery disease with significant stenosis  SIADH, Depression, CKD-3 with hyperkalemia, past E. coli UTIs, driveline drainage (21) and COVID-19 (back in 2020)  He was recently seen in clinic where he complained of abdominal pain and dark stools w constipation back in May. He presents to [facility] ER today weakness and fatigue, moderate and + Black stools for three days, on coumadin secondary to warfarin use in the setting of an LVAD. Patient has required transfusions for GIB in the past. Mostly recently back in 2021 pt had anemia with dark stools. No interventions was done at that time. However Last Endoscopy was done in 2020 (negative). Today labs show patient is anemic with H/H of 4.5/16.3,. INR is 8.84 MAP in the 90s, Temp 35.1. He denies any chest pain, shortness of breath, dizziness, abd pain, nausea or vomiting. found to have  rectal bleeding underwent endoscopy ,old blood in the proximal ileum ,  develop sepsis with LL opacity given Antibiotics , Extubated , reintubated , Bronchoscopy on Zosyn for LL pneumonia  and Amiodarone S/P TV Annuloplasty , patient remain intubated on full ventilatory support .S/P multiple units of blood transfusion , remain on full ventilatory support on Precedex and propofol , new central IJ line , diarrhea C diff. +ve on po vancomycin and IV Flagyl,  mildly distended belly , fever start on cefepime 2gm q 8 hrs S/P tracheostomy .  new RT Subclavian central line continue on contact  isolation ,still diarrhea on C-diff antibiotics ENT follow up appreciated , trial of C-PAP as tolerated , , copious secretion from trach. site chest x ray left lower lobe pneumonia , tolerating trch. collar 50% FI02 still excessive secretion need pulmonary toilet , off Ancef antibiotic , no more diarrhea back on full support mechanical ventilator , chest x ray show improvement in LLL air space disease, more awake and responsive on tube feeding no more diarrhea , S/P  Ancef for bacteremia no fever ,ID follow up noted ,  no nausea or vomiting or diarrhea still very weak and tired , event note pulled NG tube now replaced , back on tube feeding ,still on po vancomycin , getting PT and OT at bed side , no plan for decannulation for now. , no more diarrhea receiving PT and OPT at bed side , minimal secretion from tracheostomy site , no SOB getting stronger , improve muscle tone patient transfer to monitor bed still on contact isolation for C-Difficel colitis on 50% FI02, NG tube clogged , and change to resume tube feeding still loose stool . H/H drop significantly require blood transfusion , most likely GI bleeding , IV heparin D/C , vomiting 200 cc of creamy color tube feeding on hold no sob, still melena monitor in the CTU possible capsule endoscopy , H/H is stable ., patient develop TR sided  pneumothorax require chest tube placement , RT IJ central line  placed , develop fever shaking chills , blood culture positive for serratia marcescens , start on cefepime .the patient  become hypoxic and hypotensive placed on full ventilatory support and Vasopressin , levophed and Dobutamine ,S/P blood transfusion on meropenem and vancomycin , IR note appreciated   , on and  off pressors , occasional agitation on Precedex .S/P IR cholecystostomy tube drainage placement in the RT upper Quadrant , resume anticoagulation chest x ray noted C-PAP trail lasted only for 2 hrs , new RT SC line and D/C RT IJ line , RT pig tail cathter has been removed          (2021 16:57)    PAST MEDICAL & SURGICAL HISTORY:  CHF (congestive heart failure)    CAD (coronary artery disease)  Depression    Pleural effusion    History of 2019 novel coronavirus disease (COVID-19)  2020    Hemorrhoids    Bleeding hemorrhoids    Peripheral arterial disease    Claudication    BPH with urinary obstruction    ACC/AHA stage D systolic heart failure  Anticoagulation goal of INR 2.0 to 2.5    Falls    Clavicle fracture    CKD (chronic kidney disease), stage III    Iron deficiency anemia    H/O epistaxis    Vertigo    GI bleed    S/P TVR (tricuspid valve repair)    S/P ventricular assist device    S/P endoscopy    OBJECTIVE:  ICU Vital Signs Last 24 Hrs  T(C): 38.2 (2021 10:00), Max: 38.5 (2021 12:00)  T(F): 100.8 (2021 10:00), Max: 101.3 (2021 12:00)  HR: 65 (2021 10:00) (61 - 69)  BP: --  BP(mean): --  ABP: 105/67 (2021 10:00) (90/54 - 113/64)  ABP(mean): 77 (2021 10:00) (63 - 77)  RR: 20 (2021 10:00) (19 - 35)  SpO2: 99% (2021 10:00) (96% - 100%)       @ 07:  -   @ 07:00  --------------------------------------------------------  IN: 2693.9 mL / OUT: 1415 mL / NET: 1278.9 mL     @ 07: @ 10:49  --------------------------------------------------------  IN: 420.8 mL / OUT: 115 mL / NET: 305.8 mL  PHYSICAL EXAM:Daily   Elderly male S/P tracheostomy   on  full ventilatory support on  40% FI02  RR/20/550 VT lethargic on Precedex , off pressor    Daily Weight in k.4 (2021 00:00)  HEENT:     + NCAT  + EOMI  - Conjuctival edema   - Icterus   - Thrush   - ETT  + NGT/OGT  Neck:         + FROM  RT SCl line JVD  - Nodes - Masses + Mid-line trachea + Tracheostomy  Chest:            normal A-P diameter    Lungs:          + CTA   + Rhonchi    - Rales    - Wheezing + Decreased  LT BS   - Dullness R L  Cardiac:       + S1 + S2    + RRR   - Irregular   - S3  - S4    - Murmurs   - Rub   - Hamman’s sign   Abdomen:    + BS  + Soft + Non-tender - Distended - Organomegaly - PEG .cholecystostomy tube in place  Extremities:   - Cyanosis U/L   - Clubbing  U/L  + LE/UE Edema   + Capillary refill    + Pulses   Neuro:        - Awake   -  Alert   - Confused   - Lethargic   + Sedated  + Generalized Weakness  Skin:        - Rashes    - Erythema   + Normal incisions   + IV sites intact          HOSPITAL MEDICATIONS: All medications reviewed and analyzed  MEDICATIONS  (STANDING):  amiodarone    Tablet 200 milliGRAM(s) Oral daily  chlorhexidine 0.12% Liquid 15 milliLiter(s) Oral Mucosa every 12 hours  chlorhexidine 2% Cloths 1 Application(s) Topical <User Schedule>  dexmedetomidine Infusion 0.5 MICROgram(s)/kG/Hr (9.81 mL/Hr) IV Continuous <Continuous>  dextrose 50% Injectable 50 milliLiter(s) IV Push every 15 minutes  heparin  Infusion 400 Unit(s)/Hr (12.5 mL/Hr) IV Continuous <Continuous>  Hydromorphone  Injectable 0.5 milliGRAM(s) IV Push once  insulin lispro (ADMELOG) corrective regimen sliding scale   SubCutaneous every 6 hours  pantoprazole  Injectable 40 milliGRAM(s) IV Push every 12 hours  piperacillin/tazobactam IVPB.. 3.375 Gram(s) IV Intermittent every 8 hours  propofol Infusion 20 MICROgram(s)/kG/Min (9.42 mL/Hr) IV Continuous <Continuous>  sodium chloride 0.9% lock flush 3 milliLiter(s) IV Push every 8 hours  sodium chloride 0.9%. 1000 milliLiter(s) (10 mL/Hr) IV Continuous <Continuous>    MEDICATIONS  (PRN):  acetaminophen    Suspension .. 650 milliGRAM(s) Oral every 6 hours PRN Temp greater or equal to 38C (100.4F)    LABS: All Lab data reviewed and analyzed                                    7.9    10.92 )-----------( 191      ( 03 Aug 2021 01:03 )             26.1   08-03    131<L>  |  99  |  11  ----------------------------<  133<H>  4.2   |  24  |  0.56    Ca    8.8      03 Aug 2021 01:01  Phos  2.6     08-03  Mg     1.8     08-03    TPro  6.5  /  Alb  3.0<L>  /  TBili  0.5  /  DBili  x   /  AST  17  /  ALT  47<H>  /  AlkPhos  171<H>      Ca    8.7      02 Aug 2021 00:41  Phos  2.8     -  Mg     1.7         TPro  6.2  /  Alb  2.6<L>  /  TBili  0.5  /  DBili  x   /  AST  15  /  ALT  60<H>  /  AlkPhos  169<H>      Ca    8.8      01 Aug 2021 00:32  Phos  2.9       Mg     1.8         TPro  6.6  /  Alb  3.0<L>  /  TBili  0.7  /  DBili  x   /  AST  21  /  ALT  93<H>  /  AlkPhos  181<H>      Ca    8.6      2021 00:25  Phos  3.2       Mg     2.0         TPro  6.2  /  Alb  2.7<L>  /  TBili  1.2  /  DBili  x   /  AST  43<H>  /  ALT  131<H>  /  AlkPhos  194<H>      Ca    9.1      2021 00:58  Phos  1.5       Mg     1.9         TPro  6.4  /  Alb  3.0<L>  /  TBili  1.3<H>  /  DBili  x   /  AST  117<H>  /  ALT  210<H>  /  AlkPhos  224<H>                                                                               PTT - ( 2021 04:52 )  PTT:45.2 sec LIVER FUNCTIONS - ( 2021 00:42 )  Alb: 3.4 g/dL / Pro: 6.7 g/dL / ALK PHOS: 213 U/L / ALT: 15 U/L / AST: 24 U/L / GGT: x           RADIOLOGY: - Reviewed and analyzed RT Pig tail cathter  , LVAD HM2, CT scan of abdomen reviewed result noted

## 2021-08-03 NOTE — PROGRESS NOTE ADULT - SUBJECTIVE AND OBJECTIVE BOX
Interventional Radiology Follow-Up Note.     Patient seen and examined @ bedside between 7:30 and 8 Am.     This is a 66y Male s/p perc dawn on 7/30 in Interventional Radiology with Dr. Cole.         Medication:     aMIOdarone    Tablet: (08-03)  cefepime   IVPB: (08-03)  furosemide   Injectable: (08-01)  heparin   Injectable: (08-03)  metroNIDAZOLE  IVPB: (08-03)  vancomycin    Solution: (08-03)    Vitals:   T(F): 99.1, Max: 99.6 (16:00)  HR: 68  BP: --  RR: 20  SpO2: 97%    Physical Exam:  General: Nontoxic, in NAD.  Abdomen: soft, NTND.   Cholecystostomy Drain Device: Drain intact attached to gravity drain bag.  Drain with bilious out put. Dressing clean, dry, intact.     24hr Drain output: 365cc    LABS:  Aspartate Aminotransferase (AST/SGOT): 17 U/L (08-03-21 @ 01:01)  Alanine Aminotransferase (ALT/SGPT): 47 U/L (08-03-21 @ 01:01)  Aspartate Aminotransferase (AST/SGOT): 15 U/L (08-02-21 @ 00:41)  Alanine Aminotransferase (ALT/SGPT): 60 U/L (08-02-21 @ 00:41)        LABS:  Na: 131 (08-03 @ 01:01), 133 (08-02 @ 00:41), 138 (08-01 @ 00:32)  K: 4.2 (08-03 @ 01:01), 4.1 (08-02 @ 00:41), 4.0 (08-01 @ 00:32)  Cl: 99 (08-03 @ 01:01), 101 (08-02 @ 00:41), 106 (08-01 @ 00:32)  CO2: 24 (08-03 @ 01:01), 23 (08-02 @ 00:41), 24 (08-01 @ 00:32)  BUN: 11 (08-03 @ 01:01), 12 (08-02 @ 00:41), 12 (08-01 @ 00:32)  Cr: 0.56 (08-03 @ 01:01), 0.52 (08-02 @ 00:41), 0.51 (08-01 @ 00:32)  Glu: 133(08-03 @ 01:01), 184(08-02 @ 00:41), 138(08-01 @ 00:32)    Hgb: 7.9 (08-03 @ 01:03), 7.9 (08-02 @ 00:41), 8.1 (08-01 @ 00:32)  Hct: 26.1 (08-03 @ 01:03), 25.7 (08-02 @ 00:41), 26.5 (08-01 @ 00:32)  WBC: 10.92 (08-03 @ 01:03), 10.43 (08-02 @ 00:41), 13.03 (08-01 @ 00:32)  Plt: 191 (08-03 @ 01:03), 168 (08-02 @ 00:41), 153 (08-01 @ 00:32)    INR: 1.42 08-02-21 @ 00:41  PTT: 27.7 08-02-21 @ 00:41          LIVER FUNCTIONS - ( 03 Aug 2021 01:01 )  Alb: 3.0 g/dL / Pro: 6.5 g/dL / ALK PHOS: 171 U/L / ALT: 47 U/L / AST: 17 U/L / GGT: x           ABG - ( 03 Aug 2021 00:25 )  pH, Arterial: 7.40  pH, Blood: x     /  pCO2: 46    /  pO2: 136   / HCO3: 28    / Base Excess: 3.2   /  SaO2: 99         Culture - Body Fluid with Gram Stain (07.30.21 @ 12:00)    Gram Stain:   No polymorphonuclear leukocytes per low power field  No organisms seen    Specimen Source: .Body Fluid Bile Fluid    Culture Results:   No growth      Assessment/Plan:   : 67 YO M with a history of stage D NICM s/p HM2 on 9/2017 as DT (due to severe PAD) with TV ring, prior COVID-19 infection 4/2020, recurrent syncope post LVAD s/p ILR, and chronic abdominal pain with prior negative workup who was admitted with symptomatic anemia with Hgb 4.5 in setting of INR 8.8 without hemodynamic instability. He was transfused and underwent VCE which showed active bleeding in the mid small bowel but subsequent enteroscopy 6/15 did not reveal any active bleeding. He acutely decompensated after procedure with fever/hypertension, low flow alarms, and pulmonary infiltrate with hypoxia requiring intubation from probable aspiration PNA. His LDH is normal and there are no signs of overt LVAD dysfunction on echo though signs of insufficiently unloaded ventricle for which his speed has been increased. Course notable for inability to wean ventilator with persistent secretions for which he underwent tracheostomy as well as acute c diff colitis. Worsening anemia over last few days requiring transfusion with low flow alarms, concerning for recurrent GI bleed. Now w/ hypotension and fevers. Pt found to have acute cholecystitis s/p cholecystostomy drain on  7/30. Afebrile.    - no growth on culture.  - on IV abx.  - Flush drain as ordered; DO NOT aspirate  - Change dressing q3 days or when dressing is saturated.  -  Monitor h/h; transfuse as needed  - Trend vs/labs  - Continue global management per primary team  - The patient will need surgical follow up to decide if he is a surgical candidate after he is stable.  If he has cholecystectomy surgery the drain will be removed at the time of surgery. He should follow up with IR for initial cholecystostomy tube check 4-6 weeks after placement.  After initial follow up with IR if the drainage catheter remains in then he should have routine exchange of drainage every 3 months.   - Pt will benefit from VNS service to help with drainage catheter care; Pt should continue same drainage catheter care as an outpatient.   - D/w Dr. carolyn marino.     Please call IR at  [phone] with any questions, concerns, or issues regarding above.    Also available on Teams.

## 2021-08-03 NOTE — PROGRESS NOTE ADULT - ASSESSMENT
64 yo male admitted with melena and anemia, heart failure , intubated for aspiration pneumonia and was unable to be weaned from ventillator requiring tracheostomy. Pt. is ventillator dependent in the CTICU and having episodes of  "coughing fits" with no etiology  found . Laryngoscope was normal with copious secretions seen.

## 2021-08-03 NOTE — PROGRESS NOTE ADULT - ASSESSMENT
66 years old male with a history of stage D NICM s/p HM2 on 9/2017 as DT (due to severe PAD) with TV ring, prior COVID-19 infection 4/2020, recurrent syncope post LVAD s/p ILR, admitted on 6/14/21with symptomatic anemia with Hgb 4.5 in setting of INR 8.8 without hemodynamic instability. He was transfused and underwent VCE which showed active bleeding in the mid small bowel but subsequent enteroscopy 6/15 did not reveal any active bleeding. He acutely decompensated after procedure with fever/hypertension, low flow alarms, and pulmonary infiltrate with hypoxia requiring intubation from probable aspiration PNA. Course notable for inability to wean ventilator with persistent secretions for which he underwent tracheostomy. He developed C. diff colitis and is on vancomycin taper. He is currently vent dependent now and requiring pressor support. BCx showed serratia on 7/26, which correlates to his sputum culture on 7/7 and 7/27. The source is most likely from pneumonia or cholecystitis. Transaminitis could be secondary to acalculous cholecystitis. Stenotrophomonas is likely colonization and does not need to be treated unless patient decompensated on current treatment or having increased ventilation requirement.      IMPRESSION:  Septic shock secondary to high grade Serratia bacteremia, secondary to cholecystitis  Tension pneumothorax s/p right chest tube 7/26  Transaminitis, cholestatic pattern, possibly acalculous cholecystitis s/p perc dawn tube 7/30  C. diff colitis on PO vancomycin  ICM s/p LVAD  BCx serratia 7/26, NGTD since 7/27  Sputum culture serratia 7/7, serratia and stenotrophomonas 7/27, stenotrophomonas 8/1  Biliary Cx negative (collected after abx started)  CT consolidation on left lower lobe, improved from CT in June 2021  No cholangitis  No signs of drive line infection      RECOMMENDATIONS:  - Continue with IV cefepime 1gm q8hrs and IV Flagyl 500mg q8hrs for a total of 14 days (ends on 8/9) for GNR bacteremia  - Hold off on treating Stenotrophomonas for now unless he deteriorates on current treatment or with signs of worsening PNA  - Continue PO vancomycin 125mg q12hrs while on board-spectrum antibiotics  - Will continue to follow  - Guarded prognosis      Case discussed with attending and primary team      Marisa Hall DO  PGY4 ID fellow  Pager: [phone]  [facility] pager ID: [phone]  Please contact me via page or text through [employer] Teams  If after 5PM or on weekends, please call [phone]

## 2021-08-03 NOTE — PROGRESS NOTE ADULT - SUBJECTIVE AND OBJECTIVE BOX
INFECTIOUS DISEASE FOLLOW UP NOTE:    Interval History/ROS: Patient is a 66y old  Male who presents with a chief complaint of Anemia, Supratherapeutic INR, Dark Stools (03 Aug 2021 18:15)    Overnight events: Patient remains afebrile overnight and hemodynamically stable. No need for pressor. Looks comfortably on CPAP trial. Still reports diffuse abdominal pain. Drive line dressing changed today.     REVIEW OF SYSTEMS:  CONSTITUTIONAL: No weakness; no fevers or chills  RESPIRATORY: ON CPAP trial no cough, wheezing, hemoptysis  CARDIOVASCULAR: No chest pain or palpitations  GASTROINTESTINAL: Reports abdominal pain;no nausea, vomiting, or hematemesis; no diarrhea or constipation; no melena or hematochezia; no change of stool caliber  GENITOURINARY: No dysuria, increased in urinary frequency or hematuria; no urethral discharge  NEUROLOGICAL: No headache/dizziness  MSK: No joint pain, erythema, or swelling; no back pain  SKIN: No itching, rashes; no recent insect bites  All other ROS negative except noted above    Prior hospital charts reviewed [Yes]  Primary team notes reviewed [Yes]  Other consultant notes reviewed [Yes]    Allergies:  No Known Allergies      ANTIMICROBIALS:   cefepime   IVPB 1000 every 8 hours  metroNIDAZOLE  IVPB 500 every 8 hours  vancomycin    Solution 125 every 12 hours      OTHER MEDS: MEDICATIONS  (STANDING):  acetaminophen    Suspension .. 650 every 6 hours PRN  aMIOdarone    Tablet 200 daily  clonazePAM  Tablet 0.5 at bedtime  dexMEDEtomidine Infusion 0.5 <Continuous>  heparin   Injectable 5000 every 8 hours  insulin lispro (ADMELOG) corrective regimen sliding scale  every 4 hours  octreotide  Injectable 50 every 8 hours  pantoprazole  Injectable 40 every 12 hours  polyethylene glycol 3350 17 daily  simethicone 80 every 8 hours PRN      Vital Signs Last 24 Hrs  T(F): 97.9 (08-03-21 @ 17:00), Max: 101.7 (07-28-21 @ 00:30)    Vital Signs Last 24 Hrs  HR: 106 (08-03-21 @ 19:00) (61 - 127)  RR: 28 (08-03-21 @ 19:00)  SpO2: 100% (08-03-21 @ 19:00) (97% - 100%)      EXAM:  GENERAL: NAD, lying in bed comfortably  HEAD: Atraumatic, Normocephalic  EYES: EOMI, PERRLA, conjunctiva pink and cornea white  ENT: Normal external ears and nose, no discharges; moist mucous membranes, no oral thrush  NECK: Supple, nontender to palpation; no JVD: RIJ TLC removed  CHEST/LUNG: Rhonchi diffusely, good air entry  HEART: Regular rate and rhythm; No murmurs, rubs, or gallops  ABDOMEN: Soft, mildly distended, diffusely tender to palpation; no rebound tenderness, no guarding; normoactive bowel sounds; bilary drain in place; drive line in place, with zeny dressing, no surrounding erythema  EXTREMITIES:  2+ Peripheral Pulses, brisk capillary refill. No clubbing, cyanosis, or petal edema  NERVOUS SYSTEM: Awake and alert but unable to speak. Very weak but moves his extremities   MSK: No joint erythema, swelling or pain  SKIN: no superficial thrombophlebitis    Labs:                        7.9    10.92 )-----------( 191      ( 03 Aug 2021 01:03 )             26.1     08-03    131<L>  |  99  |  11  ----------------------------<  133<H>  4.2   |  24  |  0.56    Ca    8.8      03 Aug 2021 01:01  Phos  2.6     08-03  Mg     1.8     08-03    TPro  6.5  /  Alb  3.0<L>  /  TBili  0.5  /  DBili  x   /  AST  17  /  ALT  47<H>  /  AlkPhos  171<H>  08-03      WBC Trend:  WBC Count: 10.92 (08-03-21 @ 01:03)  WBC Count: 10.43 (08-02-21 @ 00:41)  WBC Count: 13.03 (08-01-21 @ 00:32)  WBC Count: 12.46 (07-31-21 @ 00:25)      Creatine Trend:  Creatinine, Serum: 0.56 (08-03)  Creatinine, Serum: 0.52 (08-02)  Creatinine, Serum: 0.51 (08-01)  Creatinine, Serum: 0.58 (07-31)      Liver Biochemical Testing Trend:  Alanine Aminotransferase (ALT/SGPT): 47 *H* (08-03)  Alanine Aminotransferase (ALT/SGPT): 60 *H* (08-02)  Alanine Aminotransferase (ALT/SGPT): 93 *H* (08-01)  Alanine Aminotransferase (ALT/SGPT): 131 *H* (07-31)  Alanine Aminotransferase (ALT/SGPT): 210 *H* (07-30)  Aspartate Aminotransferase (AST/SGOT): 17 (08-03-21 @ 01:01)  Aspartate Aminotransferase (AST/SGOT): 15 (08-02-21 @ 00:41)  Aspartate Aminotransferase (AST/SGOT): 21 (08-01-21 @ 00:32)  Aspartate Aminotransferase (AST/SGOT): 43 (07-31-21 @ 00:25)  Aspartate Aminotransferase (AST/SGOT): 117 (07-30-21 @ 00:58)  Bilirubin Total, Serum: 0.5 (08-03)  Bilirubin Total, Serum: 0.5 (08-02)  Bilirubin Total, Serum: 0.7 (08-01)  Bilirubin Total, Serum: 1.2 (07-31)  Bilirubin Total, Serum: 1.3 (07-30)      Trend LDH  08-03-21 @ 01:01  217  08-02-21 @ 00:41  241  08-01-21 @ 00:32  256<H>  07-31-21 @ 00:25  295<H>  07-30-21 @ 00:58  281<H>          MICROBIOLOGY:    MRSA PCR Result.: Madhu (06-14-21 @ 23:02)      Culture - Sputum (collected 01 Aug 2021 17:37)  Source: .Sputum Sputum  Final Report:    Moderate Stenotrophomonas maltophilia    Normal Respiratory Bria absent  Organism: Stenotrophomonas maltophilia  Organism: Stenotrophomonas maltophilia    Sensitivities:      -  Ceftazidime: S 4      -  Levofloxacin: S <=0.5      -  Trimethoprim/Sulfamethoxazole: S <=0.5/9.5      Method Type: CLAU    Culture - Blood (collected 01 Aug 2021 17:14)  Source: .Blood Blood-Peripheral  Preliminary Report:    No growth to date.    Culture - Blood (collected 01 Aug 2021 17:14)  Source: .Blood Blood-Peripheral  Preliminary Report:    No growth to date.    Culture - Blood (collected 31 Jul 2021 20:18)  Source: .Blood Blood  Preliminary Report:    No growth to date.    Culture - Blood (collected 31 Jul 2021 20:18)  Source: .Blood Blood  Preliminary Report:    No growth to date.    Culture - Body Fluid with Gram Stain (collected 30 Jul 2021 12:00)  Source: .Body Fluid Bile Fluid  Preliminary Report:    No growth    Culture - Blood (collected 29 Jul 2021 03:37)  Source: .Blood Blood-Peripheral  Final Report:    No Growth Final    Culture - Blood (collected 28 Jul 2021 10:19)  Source: .Blood Blood-Venous  Final Report:    No Growth Final    Culture - Blood (collected 28 Jul 2021 10:19)  Source: .Blood Blood-Peripheral  Final Report:    No Growth Final    Culture - Blood (collected 27 Jul 2021 19:10)  Source: .Blood Blood  Final Report:    No Growth Final      HIV-1/2 Combo Result: Nonreact (01-14-21 @ 08:18)    ABS CD4: 129 /uL (04-07-20 @ 08:38)      COVID-19 PCR: NotDetec (06-14-21 @ 12:24)    COVID-19 [place] AB Interp: Positive (06-15-21 @ 10:15)  COVID-19 IgG Antibody Interpretation: Positive (01-14-21 @ 08:55)  COVID-19 IgG Antibody Interpretation: Positive (09-08-20 @ 08:50)      Lactate Dehydrogenase, Serum: 217 (08-03)  Lactate Dehydrogenase, Serum: 241 (08-02)  Lactate Dehydrogenase, Serum: 256 (08-01)  Lactate Dehydrogenase, Serum: 295 (07-31)  Lactate Dehydrogenase, Serum: 281 (07-30)  Lactate Dehydrogenase, Serum: 430 (07-28)    Blood Gas Arterial, Lactate: 0.4 (08-03 @ 13:20)  Blood Gas Arterial, Lactate: 0.7 (08-03 @ 00:25)  Blood Gas Arterial, Lactate: 0.6 (08-02 @ 14:00)  Blood Gas Arterial, Lactate: 0.8 (08-02 @ 00:34)      RADIOLOGY:  imaging below personally reviewed    < from: Xray Chest 1 View- PORTABLE-Routine (Xray Chest 1 View- PORTABLE-Routine in AM.) (08.03.21 @ 02:15) >  The heart is enlarged. The lungs appear to be clear. No pleural effusion. No pneumothorax. All life supporting devices are in good position and unchanged when compared to previous study done October 2, 2021 at 6:10 PM. Status post sternotomy.

## 2021-08-03 NOTE — PROGRESS NOTE ADULT - SUBJECTIVE AND OBJECTIVE BOX
Kiana Wheat MD  Cardiology Fellow  [phone]  All Cardiology service information can be found 24/7 on [email or web address], password: [email or web address]    Patient seen and examined at bedside.    Overnight Events: No events, patient feels better, no abdominal pain.    Review Of Systems: No chest pain, shortness of breath, or palpitations            Current Meds:  acetaminophen    Suspension .. 650 milliGRAM(s) Oral every 6 hours PRN  aMIOdarone    Tablet 200 milliGRAM(s) Oral daily  cefepime   IVPB 1000 milliGRAM(s) IV Intermittent every 8 hours  chlorhexidine 0.12% Liquid 15 milliLiter(s) Oral Mucosa every 12 hours  chlorhexidine 2% Cloths 1 Application(s) Topical <User Schedule>  clonazePAM  Tablet 0.5 milliGRAM(s) Oral at bedtime  dexMEDEtomidine Infusion 0.5 MICROgram(s)/kG/Hr IV Continuous <Continuous>  heparin   Injectable 5000 Unit(s) SubCutaneous every 8 hours  insulin lispro (ADMELOG) corrective regimen sliding scale   SubCutaneous every 4 hours  metroNIDAZOLE  IVPB 500 milliGRAM(s) IV Intermittent every 8 hours  octreotide  Injectable 50 MICROGram(s) IV Push every 8 hours  pantoprazole  Injectable 40 milliGRAM(s) IV Push every 12 hours  polyethylene glycol 3350 17 Gram(s) Oral daily  simethicone 80 milliGRAM(s) Chew every 8 hours PRN  sodium chloride 0.9%. 1000 milliLiter(s) IV Continuous <Continuous>  vancomycin    Solution 125 milliGRAM(s) Oral every 12 hours      Vitals:  T(F): 99.1 (08-03), Max: 99.6 (08-02)  HR: 84 (08-03) (61 - 104)  BP: --  RR: 20 (08-03)  SpO2: 100% (08-03)  I&O's Summary    02 Aug 2021 07:01  -  03 Aug 2021 07:00  --------------------------------------------------------  IN: 1524.8 mL / OUT: 2131 mL / NET: -606.2 mL    03 Aug 2021 07:01  -  03 Aug 2021 12:53  --------------------------------------------------------  IN: 320 mL / OUT: 500 mL / NET: -180 mL        Physical Exam:  Appearance: No acute distress  Eyes: PERRL, EOMI, pink conjunctiva  HEENT: Normal oral mucosa  Cardiovascular: RRR, S1, S2, no murmurs, rubs, or gallops; no edema; no JVD  Respiratory: Clear to auscultation bilaterally  Gastrointestinal: soft, non-tender, non-distended with normal bowel sounds  Musculoskeletal: No clubbing; no joint deformity   Neurologic: Non-focal  Lymphatic: No lymphadenopathy                 7.9    10.92 )-----------( 191      ( 03 Aug 2021 01:03 )             26.1     08-03    131<L>  |  99  |  11  ----------------------------<  133<H>  4.2   |  24  |  0.56    Ca    8.8      03 Aug 2021 01:01  Phos  2.6     08-03  Mg     1.8     08-03    TPro  6.5  /  Alb  3.0<L>  /  TBili  0.5  /  DBili  x   /  AST  17  /  ALT  47<H>  /  AlkPhos  171<H>  08-03    PT/INR - ( 02 Aug 2021 00:41 )   PT: 16.8 sec;   INR: 1.42 ratio         PTT - ( 02 Aug 2021 00:41 )  PTT:27.7 sec              New ECG(s): Personally reviewed    Echo:    Stress Testing:     Cath:    Imaging:    Interpretation of Telemetry:   Kiana Wheat MD  Cardiology Fellow  [phone]  All Cardiology service information can be found 24/7 on [email or web address], password: [email or web address]    Patient seen and examined at bedside.    Overnight Events: No events, patient feels better, no abdominal pain.    Review Of Systems: No chest pain, shortness of breath, or palpitations            Current Meds:  acetaminophen    Suspension .. 650 milliGRAM(s) Oral every 6 hours PRN  aMIOdarone    Tablet 200 milliGRAM(s) Oral daily  cefepime   IVPB 1000 milliGRAM(s) IV Intermittent every 8 hours  chlorhexidine 0.12% Liquid 15 milliLiter(s) Oral Mucosa every 12 hours  chlorhexidine 2% Cloths 1 Application(s) Topical <User Schedule>  clonazePAM  Tablet 0.5 milliGRAM(s) Oral at bedtime  dexMEDEtomidine Infusion 0.5 MICROgram(s)/kG/Hr IV Continuous <Continuous>  heparin   Injectable 5000 Unit(s) SubCutaneous every 8 hours  insulin lispro (ADMELOG) corrective regimen sliding scale   SubCutaneous every 4 hours  metroNIDAZOLE  IVPB 500 milliGRAM(s) IV Intermittent every 8 hours  octreotide  Injectable 50 MICROGram(s) IV Push every 8 hours  pantoprazole  Injectable 40 milliGRAM(s) IV Push every 12 hours  polyethylene glycol 3350 17 Gram(s) Oral daily  simethicone 80 milliGRAM(s) Chew every 8 hours PRN  sodium chloride 0.9%. 1000 milliLiter(s) IV Continuous <Continuous>  vancomycin    Solution 125 milliGRAM(s) Oral every 12 hours      Vitals:  T(F): 99.1 (08-03), Max: 99.6 (08-02)  HR: 84 (08-03) (61 - 104)  BP: --  RR: 20 (08-03)  SpO2: 100% (08-03)  I&O's Summary    02 Aug 2021 07:01  -  03 Aug 2021 07:00  --------------------------------------------------------  IN: 1524.8 mL / OUT: 2131 mL / NET: -606.2 mL    03 Aug 2021 07:01  -  03 Aug 2021 12:53  --------------------------------------------------------  IN: 320 mL / OUT: 500 mL / NET: -180 mL        Physical Exam:  Appearance: No acute distress  Eyes: PERRL, EOMI, pink conjunctiva  HEENT: Normal oral mucosa  Cardiovascular: RRR, S1, S2, no murmurs, rubs, or gallops; no edema; no JVD  Respiratory: Clear to auscultation bilaterally  Gastrointestinal: soft, non-tender, non-distended with normal bowel sounds  Musculoskeletal: No clubbing; no joint deformity   Neurologic: Non-focal  Lymphatic: No lymphadenopathy                 7.9    10.92 )-----------( 191      ( 03 Aug 2021 01:03 )             26.1     08-03    131<L>  |  99  |  11  ----------------------------<  133<H>  4.2   |  24  |  0.56    Ca    8.8      03 Aug 2021 01:01  Phos  2.6     08-03  Mg     1.8     08-03    TPro  6.5  /  Alb  3.0<L>  /  TBili  0.5  /  DBili  x   /  AST  17  /  ALT  47<H>  /  AlkPhos  171<H>  08-03    PT/INR - ( 02 Aug 2021 00:41 )   PT: 16.8 sec;   INR: 1.42 ratio         PTT - ( 02 Aug 2021 00:41 )  PTT:27.7 sec

## 2021-08-03 NOTE — PROGRESS NOTE ADULT - SUBJECTIVE AND OBJECTIVE BOX
RICKY HAMPTON  MRN-[id number]  Patient is a 66y old  Male who presents with a chief complaint of Anemia, Supratherapeutic INR, Dark Stools (03 Aug 2021 10:05)    HPI:  65M PMH ACC/AHA stage D HF due to NICM HM2 LVAD , TV annuloplasty ring 17 as destination therapy due to severe peripheral artery disease with significant stenosis  SIADH, Depression, CKD-3 with hyperkalemia, past E. coli UTIs, driveline drainage (21) and COVID-19 (back in 2020)  He was recently seen in clinic where he complained of abdominal pain and dark stools w constipation back in May. He presents to [facility] ER today weakness and fatigue, moderate and + Black stools for three days, on coumadin secondary to warfarin use in the setting of an LVAD. Patient has required transfusions for GIB in the past. Mostly recently back in 2021 pt had anemia with dark stools. No interventions was done at that time. However Last Endoscopy was done in 2020 (negative). Today labs show patient is anemic with H/H of 4.5/16.3,. INR is 8.84 MAP in the 90s, Temp 35.1. He denies any chest pain, shortness of breath, dizziness, abd pain, nausea or vomiting.       (2021 16:57)      Surgery/Hospital Course:   admit for melena w/ anemia, INR 8.84   6/15 Capsul study (+) for small bowel bleed, balloon endoscopy (old blood in prox ileum); post EGD - septic w/ L opacity, re-intubated for concern for aspiration, TTE (Mod MR, decrease biV w/ interventricular septum boweing towards R)   bronch    +C Diff    TC    CT C/A/P: Fluid filled colon which may be 2/2 rapid transit. Small bilateral pleffs with associates. Compressive atelectasis New ISABELLE & LLL  parenchymal opacities, suspicious for pneumonia. 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    #8 Shiley trach at bedside    CPAP trials    LVAD speed increased to 9200   Bronch; Central line dc'ed   TC since , continue as tolerated. Patient transferred to SDU.    Cont PT, no trach downsize today(#8cuffed)/ Anticoagulation/ Continue TF, speech f/u/ Vanco taper    oob to chair  INR  2.47  5 mg coumadin     increased lop to 25 q8  per HF   INR today 2.64.  H&H 7.3/24 this AM.  Will repeat CBC at noon, and will send stool guaiac Patient with persistent abdominal tenderness, rate of tube feeds decreased.  No nausea/vomiting.     INR today 2.4H&H 9.1/.6 low flow overnight /N&V  NPO resolved for capsule study  Coumadin on hold refusing Tube feeds on D5  normal  @50 cc/hr   INR 2.69  H&H 7..1 refusing Tube feeds on D5  normal  @50 cc/hr. This am + BM Anusha Oneill HF  aware- PRBC x1  GI team consulted -  NPO  plan on study in am-  D/w Dr Cadet Patient  to return to CTU for further management; 1PRBCS    Post op INR 2.2 today.  No bleeding. BC + for SM.  Pt is hypotensive requiring pressor and inotropic support.  ID follow up today on Cefepime will follow.   R PTC for PTX    CT C/A/P: sub q emphysema in R chest wall, GGO RUL, small ascites CTH negative; Abd US: GB thickening, pericholecystic fluid     Perchole drain in place continues to drain total output overnight 133.  Fever today 38.8 given tylenol.  Will repeat BC now.     duplex LE negative     Today:  Continue CPAP/TC vent weaning as tolerated. OOBTC. NO AC or AP as heart failure.     REVIEW OF SYSTEMS:  Unable to obtain secondary to pt being trached    ICU Vital Signs Last 24 Hrs  T(C): 37.3 (03 Aug 2021 04:00), Max: 37.6 (02 Aug 2021 16:00)  T(F): 99.1 (03 Aug 2021 04:00), Max: 99.6 (02 Aug 2021 16:00)  HR: 68 (03 Aug 2021 09:38) (61 - 104)  BP: --  BP(mean): --  ABP: 89/72 (03 Aug 2021 09:00) (67/59 - 105/74)  ABP(mean): 79 (03 Aug 2021 09:00) (63 - 88)  RR: 20 (03 Aug 2021 09:00) (20 - 100)  SpO2: 97% (03 Aug 2021 09:38) (97% - 100%)      Physical Exam:  Gen:  intubated   CNS: sedated 	  Neck: no JVD, +TC   RES : clear , no wheezing              CVS: +LVAD hum   Abd: Soft, +LLQ tender to palpation   Skin: No rash.  Ext:  no edema    ============================I/O===========================   I&O's Detail    02 Aug 2021 07:01  -  03 Aug 2021 07:00  --------------------------------------------------------  IN:    Dexmedetomidine: 54.8 mL    IV PiggyBack: 100 mL    IV PiggyBack: 350 mL    Miscellaneous Tube Feedin mL    sodium chloride 0.9%: 120 mL  Total IN: 1524.8 mL    OUT:    Chest Tube (mL): 10 mL    Drain (mL): 365 mL    Emesis (mL): 1 mL    Indwelling Catheter - Urethral (mL): 35 mL    Voided (mL): 1720 mL  Total OUT: 2131 mL    Total NET: -606.2 mL      03 Aug 2021 07:01  -  03 Aug 2021 10:58  --------------------------------------------------------  IN:    sodium chloride 0.9%: 15 mL  Total IN: 15 mL    OUT:    Dexmedetomidine: 0 mL    Miscellaneous Tube Feedin mL  Total OUT: 0 mL    Total NET: 15 mL        ============================ LABS =========================                        7.9    10.92 )-----------( 191      ( 03 Aug 2021 01:03 )             26.1     08-03    131<L>  |  99  |  11  ----------------------------<  133<H>  4.2   |  24  |  0.56    Ca    8.8      03 Aug 2021 01:01  Phos  2.6     08-  Mg     1.8     08-03    TPro  6.5  /  Alb  3.0<L>  /  TBili  0.5  /  DBili  x   /  AST  17  /  ALT  47<H>  /  AlkPhos  171<H>  08-03    LIVER FUNCTIONS - ( 03 Aug 2021 01:01 )  Alb: 3.0 g/dL / Pro: 6.5 g/dL / ALK PHOS: 171 U/L / ALT: 47 U/L / AST: 17 U/L / GGT: x           PT/INR - ( 02 Aug 2021 00:41 )   PT: 16.8 sec;   INR: 1.42 ratio         PTT - ( 02 Aug 2021 00:41 )  PTT:27.7 sec  ABG - ( 03 Aug 2021 00:25 )  pH, Arterial: 7.40  pH, Blood: x     /  pCO2: 46    /  pO2: 136   / HCO3: 28    / Base Excess: 3.2   /  SaO2: 99                Urinalysis Basic - ( 01 Aug 2021 12:29 )    Color: Colorless / Appearance: Clear / S.010 / pH: x  Gluc: x / Ketone: Negative  / Bili: Negative / Urobili: Negative   Blood: x / Protein: Negative / Nitrite: Negative   Leuk Esterase: Negative / RBC: 4 /hpf / WBC 1 /HPF   Sq Epi: x / Non Sq Epi: 1 /hpf / Bacteria: Negative      ======================Micro/Rad/Cardio=================  Culture: Reviewed   CXR: Reviewed  Echo:Reviewed  ======================================================  PAST MEDICAL & SURGICAL HISTORY:  CHF (congestive heart failure)    CAD (coronary artery disease)    Depression    Pleural effusion    History of 2019 novel coronavirus disease (COVID-19)  2020    Hemorrhoids    Bleeding hemorrhoids    Peripheral arterial disease    Claudication    BPH with urinary obstruction    ACC/AHA stage D systolic heart failure    Anticoagulation goal of INR 2.0 to 2.5    Falls    Clavicle fracture    CKD (chronic kidney disease), stage III    Iron deficiency anemia    H/O epistaxis    Vertigo    GI bleed    S/P TVR (tricuspid valve repair)    S/P ventricular assist device    S/P endoscopy      ====================ASSESSMENT ==============  Stage D Nonischemic Cardiomyopathy, Status Post HM2 on 2017    Cardiogenic shock  Hemodynamic instability   Acute hypoxemic respiratory failure  GI bleed , Status Post Enteroscopy   Anemia, in setting of melena   Thrombocytopenia   Leukocytosis   Chronic Kidney Disease  Stress hyperglycemia   C.diff positive on    Hypovolemic shock  Septic shock    Plan:  ====================== NEUROLOGY=====================  CTH on  with no acute intracranial hemorrhage or acute territorial infarction   Sedated with IV Precedex to maintain vent synchrony   Tylenol PRN for analgesia   C/w clonazepam for agitation    acetaminophen    Suspension .. 650 milliGRAM(s) Oral every 6 hours PRN Mild Pain (1 - 3)  clonazePAM  Tablet 0.5 milliGRAM(s) Oral at bedtime  dexMEDEtomidine Infusion 0.5 MICROgram(s)/kG/Hr (9.81 mL/Hr) IV Continuous <Continuous>    ==================== RESPIRATORY======================  Acute hypoxemic respiratory failure s/p #8 shiley trach on ; CPAP/TC vent weaning as tolerated.  Requiring intermittent full vent support, continue close monitoring of respiratory rate and breathing pattern, following of ABG’s with A-line monitoring, continuous pulse oximetry monitoring.   CT chest : Sub q emphysema in R chest wall, GGO RUL     Mechanical Ventilation:  Mode: CPAP with PS  FiO2: 40  PEEP: 5  PS: 10  MAP: 10  PIP: 17      ====================CARDIOVASCULAR==================  Stage D Nonischemic Cardiomyopathy, Status Post HM2 on 2017; LVAD settings 9200 with flow of 4.8   TTE : reveals at least moderate MR, mild AI, severe global LV syst dysfxn, dec RV fxn.  Continue invasive hemodynamic monitoring   Rate control with amiodarone     aMIOdarone    Tablet 200 milliGRAM(s) Oral daily    ===================HEMATOLOGIC/ONC ===================  Anemia due to acute blood loss associated with upper GI bleed   Monitor H&H/Plts closely - no AC or AP as heart failure     VTE prophylaxis, heparin   Injectable 5000 Unit(s) SubCutaneous every 8 hours    ===================== RENAL =========================  Continue monitoring urine output, I&OS, BUN/Cr   Replete lytes PRN. Keep K> 4 and Mg >2   Continue diuresis prn for goal CVP 8-10.     ==================== GASTROINTESTINAL===================  Tolerating Jevity 1.2 shantelle TF, @ goal 60 cc/hr   GI bleed in setting of recent melena, GI following -  Protonix IVP BID, c/w octreotide   As per GI, not stable for endoscopic evaluation, transfuse prn and f/u repeat CBC. May consider enteroscopy when stabilized.   CT A/P on : small ascites; ABD US showing GB wall thickening w/ surrounding pericholecystic fluid  S/p cholecystostomy tube placed on : with IR with -60cc of black bile, will monitor site closely.   C.diff + on , continue vanco   Bowel regimen with Miralax   Simethicone PRN dyspepsia     pantoprazole  Injectable 40 milliGRAM(s) IV Push every 12 hours  polyethylene glycol 3350 17 Gram(s) Oral daily  simethicone 80 milliGRAM(s) Chew every 8 hours PRN Dyspepsia  sodium chloride 0.9%. 1000 milliLiter(s) (5 mL/Hr) IV Continuous <Continuous>  octreotide  Injectable 50 MICROGram(s) IV Push every 8 hours    =======================    ENDOCRINE  =====================  Stress hyperglycemia, continue glucose control with admelog sliding scale     insulin lispro (ADMELOG) corrective regimen sliding scale   SubCutaneous every 4 hours    ========================INFECTIOUS DISEASE================  Temp 99.1F, WBC 10.92   Continue trending WBC and monitoring fever curve   BCx+  +Serratia marcescens c/w cefepime and metronidazole   Per ID, will consider adding bactrim if clinical condition worsens.   C.diff + on , continue vanco     cefepime   IVPB 1000 milliGRAM(s) IV Intermittent every 8 hours  metroNIDAZOLE  IVPB 500 milliGRAM(s) IV Intermittent every 8 hours  vancomycin    Solution 125 milliGRAM(s) Oral every 12 hours      Patient requires continuous monitoring with bedside rhythm monitoring, pulse ox monitoring, and intermittent blood gas analysis. Care plan discussed with ICU care team. Patient remained critical and at risk for life threatening decompensation.     By signing my name below, IAgnieszka, attest that this documentation has been prepared under the direction and in the presence of CLAUDIA Lee   Electronically signed: Cesia Shaffer, 21 @ 10:58    I, Georgina Perez, personally performed the services described in this documentation. all medical record entries made by the luisibmel were at my direction and in my presence. I have reviewed the chart and agree that the record reflects my personal performance and is accurate and complete  Electronically signed: CLAUDIA Lee        RICKY HAMPTON  MRN-[id number]  Patient is a 66y old  Male who presents with a chief complaint of Anemia, Supratherapeutic INR, Dark Stools (03 Aug 2021 10:05)    HPI:  65M PMH ACC/AHA stage D HF due to NICM HM2 LVAD , TV annuloplasty ring 17 as destination therapy due to severe peripheral artery disease with significant stenosis  SIADH, Depression, CKD-3 with hyperkalemia, past E. coli UTIs, driveline drainage (21) and COVID-19 (back in 2020)  He was recently seen in clinic where he complained of abdominal pain and dark stools w constipation back in May. He presents to [facility] ER today weakness and fatigue, moderate and + Black stools for three days, on coumadin secondary to warfarin use in the setting of an LVAD. Patient has required transfusions for GIB in the past. Mostly recently back in 2021 pt had anemia with dark stools. No interventions was done at that time. However Last Endoscopy was done in 2020 (negative). Today labs show patient is anemic with H/H of 4.5/16.3,. INR is 8.84 MAP in the 90s, Temp 35.1. He denies any chest pain, shortness of breath, dizziness, abd pain, nausea or vomiting.       (2021 16:57)      Surgery/Hospital Course:   admit for melena w/ anemia, INR 8.84   6/15 Capsul study (+) for small bowel bleed, balloon endoscopy (old blood in prox ileum); post EGD - septic w/ L opacity, re-intubated for concern for aspiration, TTE (Mod MR, decrease biV w/ interventricular septum boweing towards R)   bronch    +C Diff    TC    CT C/A/P: Fluid filled colon which may be 2/2 rapid transit. Small bilateral pleffs with associates. Compressive atelectasis New ISABELLE & LLL  parenchymal opacities, suspicious for pneumonia. 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    #8 Shiley trach at bedside    CPAP trials    LVAD speed increased to 9200   Bronch; Central line dc'ed   TC since , continue as tolerated. Patient transferred to SDU.    Cont PT, no trach downsize today(#8cuffed)/ Anticoagulation/ Continue TF, speech f/u/ Vanco taper    oob to chair  INR  2.47  5 mg coumadin     increased lop to 25 q8  per HF   INR today 2.64.  H&H 7.3/24 this AM.  Will repeat CBC at noon, and will send stool guaiac Patient with persistent abdominal tenderness, rate of tube feeds decreased.  No nausea/vomiting.     INR today 2.4H&H 9.1/.6 low flow overnight /N&V  NPO resolved for capsule study  Coumadin on hold refusing Tube feeds on D5  normal  @50 cc/hr   INR 2.69  H&H 7..1 refusing Tube feeds on D5  normal  @50 cc/hr. This am + BM Anusha Oneill HF  aware- PRBC x1  GI team consulted -  NPO  plan on study in am-  D/w Dr Cadet Patient  to return to CTU for further management; 1PRBCS    Post op INR 2.2 today.  No bleeding. BC + for SM.  Pt is hypotensive requiring pressor and inotropic support.  ID follow up today on Cefepime will follow.   R PTC for PTX    CT C/A/P: sub q emphysema in R chest wall, GGO RUL, small ascites CTH negative; Abd US: GB thickening, pericholecystic fluid     Perchole drain in place continues to drain total output overnight 133.  Fever today 38.8 given tylenol.  Will repeat BC now.     duplex LE negative       Today:  Continue CPAP/TC vent weaning as tolerated. OOBTC. NO AC or AP as heart failure.         REVIEW OF SYSTEMS:  Unable to obtain secondary to pt being trached    ICU Vital Signs Last 24 Hrs  T(C): 37.3 (03 Aug 2021 04:00), Max: 37.6 (02 Aug 2021 16:00)  T(F): 99.1 (03 Aug 2021 04:00), Max: 99.6 (02 Aug 2021 16:00)  HR: 68 (03 Aug 2021 09:38) (61 - 104)  BP: --  BP(mean): --  ABP: 89/72 (03 Aug 2021 09:00) (67/59 - 105/74)  ABP(mean): 79 (03 Aug 2021 09:00) (63 - 88)  RR: 20 (03 Aug 2021 09:00) (20 - 100)  SpO2: 97% (03 Aug 2021 09:38) (97% - 100%)      Physical Exam:  Gen:  awake and alert    CNS:  intact	  Neck: no JVD, +TC   RES : clear , no wheezing              CVS: +LVAD hum   Abd: Soft, +LLQ tender to palpation   Skin: No rash.  Ext:  no edema    ============================I/O===========================   I&O's Detail    02 Aug 2021 07:01  -  03 Aug 2021 07:00  --------------------------------------------------------  IN:    Dexmedetomidine: 54.8 mL    IV PiggyBack: 100 mL    IV PiggyBack: 350 mL    Miscellaneous Tube Feedin mL    sodium chloride 0.9%: 120 mL  Total IN: 1524.8 mL    OUT:    Chest Tube (mL): 10 mL    Drain (mL): 365 mL    Emesis (mL): 1 mL    Indwelling Catheter - Urethral (mL): 35 mL    Voided (mL): 1720 mL  Total OUT: 2131 mL    Total NET: -606.2 mL      03 Aug 2021 07:01  -  03 Aug 2021 10:58  --------------------------------------------------------  IN:    sodium chloride 0.9%: 15 mL  Total IN: 15 mL    OUT:    Dexmedetomidine: 0 mL    Miscellaneous Tube Feedin mL  Total OUT: 0 mL    Total NET: 15 mL        ============================ LABS =========================                        7.9    10.92 )-----------( 191      ( 03 Aug 2021 01:03 )             26.1     08-03    131<L>  |  99  |  11  ----------------------------<  133<H>  4.2   |  24  |  0.56    Ca    8.8      03 Aug 2021 01:01  Phos  2.6     08-  Mg     1.8     08-03    TPro  6.5  /  Alb  3.0<L>  /  TBili  0.5  /  DBili  x   /  AST  17  /  ALT  47<H>  /  AlkPhos  171<H>  08-03    LIVER FUNCTIONS - ( 03 Aug 2021 01:01 )  Alb: 3.0 g/dL / Pro: 6.5 g/dL / ALK PHOS: 171 U/L / ALT: 47 U/L / AST: 17 U/L / GGT: x           PT/INR - ( 02 Aug 2021 00:41 )   PT: 16.8 sec;   INR: 1.42 ratio         PTT - ( 02 Aug 2021 00:41 )  PTT:27.7 sec  ABG - ( 03 Aug 2021 00:25 )  pH, Arterial: 7.40  pH, Blood: x     /  pCO2: 46    /  pO2: 136   / HCO3: 28    / Base Excess: 3.2   /  SaO2: 99                Urinalysis Basic - ( 01 Aug 2021 12:29 )    Color: Colorless / Appearance: Clear / S.010 / pH: x  Gluc: x / Ketone: Negative  / Bili: Negative / Urobili: Negative   Blood: x / Protein: Negative / Nitrite: Negative   Leuk Esterase: Negative / RBC: 4 /hpf / WBC 1 /HPF   Sq Epi: x / Non Sq Epi: 1 /hpf / Bacteria: Negative      ======================Micro/Rad/Cardio=================  Culture: Reviewed   CXR: Reviewed  Echo:Reviewed  ======================================================  PAST MEDICAL & SURGICAL HISTORY:  CHF (congestive heart failure)    CAD (coronary artery disease)    Depression    Pleural effusion    History of 2019 novel coronavirus disease (COVID-19)  2020    Hemorrhoids    Bleeding hemorrhoids    Peripheral arterial disease    Claudication    BPH with urinary obstruction    ACC/AHA stage D systolic heart failure    Anticoagulation goal of INR 2.0 to 2.5    Falls    Clavicle fracture    CKD (chronic kidney disease), stage III    Iron deficiency anemia    H/O epistaxis    Vertigo    GI bleed    S/P TVR (tricuspid valve repair)    S/P ventricular assist device    S/P endoscopy      ====================ASSESSMENT ==============  Stage D Nonischemic Cardiomyopathy, Status Post HM2 on 2017    Cardiogenic shock  Hemodynamic instability   Acute hypoxemic respiratory failure  GI bleed , Status Post Enteroscopy   Anemia, in setting of melena   Thrombocytopenia   Leukocytosis   Chronic Kidney Disease  Stress hyperglycemia   C.diff positive on    Hypovolemic shock  Septic shock    Plan:  ====================== NEUROLOGY=====================  CTH on  with no acute intracranial hemorrhage or acute territorial infarction   Sedated with IV Precedex to maintain vent synchrony - now off   Tylenol PRN for analgesia   C/w clonazepam for agitation    acetaminophen    Suspension .. 650 milliGRAM(s) Oral every 6 hours PRN Mild Pain (1 - 3)  clonazePAM  Tablet 0.5 milliGRAM(s) Oral at bedtime  dexMEDEtomidine Infusion 0.5 MICROgram(s)/kG/Hr (9.81 mL/Hr) IV Continuous <Continuous>    ==================== RESPIRATORY======================  Acute hypoxemic respiratory failure s/p #8 shiley trach on ; CPAP/TC vent weaning as tolerated.  Requiring intermittent full vent support, continue close monitoring of respiratory rate and breathing pattern, following of ABG’s with A-line monitoring, continuous pulse oximetry monitoring.   CT chest : Sub q emphysema in R chest wall, GGO RUL     Mechanical Ventilation:  Mode: CPAP with PS  FiO2: 40  PEEP: 5  PS: 10  MAP: 10  PIP: 17      ====================CARDIOVASCULAR==================  Stage D Nonischemic Cardiomyopathy, Status Post HM2 on 2017; LVAD settings 9200 with flow of 4.8   TTE : reveals at least moderate MR, mild AI, severe global LV syst dysfxn, dec RV fxn.  Continue invasive hemodynamic monitoring   Rate control with amiodarone     aMIOdarone    Tablet 200 milliGRAM(s) Oral daily    ===================HEMATOLOGIC/ONC ===================  Anemia due to acute blood loss associated with upper GI bleed   Monitor H&H/Plts closely - no AC or AP as heart failure     VTE prophylaxis, heparin   Injectable 5000 Unit(s) SubCutaneous every 8 hours    ===================== RENAL =========================  Continue monitoring urine output, I&OS, BUN/Cr   Replete lytes PRN. Keep K> 4 and Mg >2   Continue diuresis prn for goal CVP 8-10.     ==================== GASTROINTESTINAL===================  Tolerating Jevity 1.2 shantelle TF, @ goal 60 cc/hr   GI bleed in setting of recent melena, GI following -  Protonix IVP BID, c/w octreotide   As per GI, not stable for endoscopic evaluation, transfuse prn and f/u repeat CBC. May consider enteroscopy when stabilized.   CT A/P on : small ascites; ABD US showing GB wall thickening w/ surrounding pericholecystic fluid  S/p cholecystostomy tube placed on : with IR with -60cc of black bile, will monitor site closely.   C.diff + on , continue vanco   Bowel regimen with Miralax   Simethicone PRN dyspepsia     pantoprazole  Injectable 40 milliGRAM(s) IV Push every 12 hours  polyethylene glycol 3350 17 Gram(s) Oral daily  simethicone 80 milliGRAM(s) Chew every 8 hours PRN Dyspepsia  sodium chloride 0.9%. 1000 milliLiter(s) (5 mL/Hr) IV Continuous <Continuous>  octreotide  Injectable 50 MICROGram(s) IV Push every 8 hours    =======================    ENDOCRINE  =====================  Stress hyperglycemia, continue glucose control with admelog sliding scale     insulin lispro (ADMELOG) corrective regimen sliding scale   SubCutaneous every 4 hours    ========================INFECTIOUS DISEASE================  Temp 99.1F, WBC 10.92   Continue trending WBC and monitoring fever curve   BCx+  +Serratia marcescens c/w cefepime and metronidazole   SCX on  serratia and stenotro   SCx on  stenotro   Per ID, will consider adding bactrim if clinical condition worsens.   C.diff + on , continue vanco     cefepime   IVPB 1000 milliGRAM(s) IV Intermittent every 8 hours  metroNIDAZOLE  IVPB 500 milliGRAM(s) IV Intermittent every 8 hours  vancomycin    Solution 125 milliGRAM(s) Oral every 12 hours      Patient requires continuous monitoring with bedside rhythm monitoring, pulse ox monitoring, and intermittent blood gas analysis. Care plan discussed with ICU care team. Patient remained critical and at risk for life threatening decompensation.     By signing my name below, I, Agnieszka Cherry, attest that this documentation has been prepared under the direction and in the presence of CLAUDIA Lee   Electronically signed: Cesia Shaffer, 21 @ 10:58    I, Georgina Perez, personally performed the services described in this documentation. all medical record entries made by the luisibmel were at my direction and in my presence. I have reviewed the chart and agree that the record reflects my personal performance and is accurate and complete  Electronically signed: CLAUDIA Lee

## 2021-08-04 NOTE — PROGRESS NOTE ADULT - ATTENDING COMMENTS
Patient seen and examined with Dr Hall  I agree with his interval history exam and plans as noted above    clinically improving  ambulated with assistance today  on trach collar  decreasing abdominal pain except in area of biliary drain    completing 10-14days of antibiotics for serratia bacteremia  oral Vancomycin followed by taper    Morris Prater MD  [phone]  After 5pm/weekends [phone]

## 2021-08-04 NOTE — PROGRESS NOTE ADULT - SUBJECTIVE AND OBJECTIVE BOX
INFECTIOUS DISEASE FOLLOW UP NOTE:    Interval History/ROS: Patient is a 66y old  Male who presents with a chief complaint of Anemia, Supratherapeutic INR, Dark Stools (04 Aug 2021 10:20)      Overnight events:    REVIEW OF SYSTEMS:  CONSTITUTIONAL: No weakness; no fevers or chills  RESPIRATORY: ON CPAP trial no cough, wheezing, hemoptysis  CARDIOVASCULAR: No chest pain or palpitations  GASTROINTESTINAL: Reports abdominal pain;no nausea, vomiting, or hematemesis; no diarrhea or constipation; no melena or hematochezia; no change of stool caliber  GENITOURINARY: No dysuria, increased in urinary frequency or hematuria; no urethral discharge  NEUROLOGICAL: No headache/dizziness  MSK: No joint pain, erythema, or swelling; no back pain  SKIN: No itching, rashes; no recent insect bites  All other ROS negative except noted above    Prior hospital charts reviewed [Yes]  Primary team notes reviewed [Yes]  Other consultant notes reviewed [Yes]    Allergies:  No Known Allergies      ANTIMICROBIALS:   cefepime   IVPB 1000 every 8 hours  metroNIDAZOLE  IVPB 500 every 8 hours  vancomycin    Solution 125 every 12 hours      OTHER MEDS: MEDICATIONS  (STANDING):  acetaminophen    Suspension .. 650 every 6 hours PRN  aMIOdarone    Tablet 200 daily  clonazePAM  Tablet 0.5 at bedtime  dexMEDEtomidine Infusion 0.5 <Continuous>  heparin   Injectable 5000 every 8 hours  insulin lispro (ADMELOG) corrective regimen sliding scale  every 4 hours  metoclopramide Injectable 10 every 8 hours  octreotide  Injectable 50 every 8 hours  ondansetron Injectable 4 every 6 hours PRN  pantoprazole  Injectable 40 every 12 hours  polyethylene glycol 3350 17 daily  senna 2 at bedtime  simethicone 80 every 8 hours PRN      Vital Signs Last 24 Hrs  T(F): 97.9 (08-04-21 @ 08:00), Max: 100.9 (07-28-21 @ 16:00)    Vital Signs Last 24 Hrs  HR: 98 (08-04-21 @ 10:00) (76 - 127)  BP: --  RR: 46 (08-04-21 @ 10:00)  SpO2: 100% (08-04-21 @ 10:00) (99% - 100%)  Wt(kg): --    EXAM:  GENERAL: NAD, lying in bed comfortably  HEAD: Atraumatic, Normocephalic  EYES: EOMI, PERRLA, conjunctiva pink and cornea white  ENT: Normal external ears and nose, no discharges; moist mucous membranes, no oral thrush  NECK: Supple, nontender to palpation; no JVD: RIJ TLC removed  CHEST/LUNG: Rhonchi diffusely, good air entry  HEART: Regular rate and rhythm; No murmurs, rubs, or gallops  ABDOMEN: Soft, mildly distended, diffusely tender to palpation; no rebound tenderness, no guarding; normoactive bowel sounds; bilary drain in place; drive line in place, with zeny dressing, no surrounding erythema  EXTREMITIES:  2+ Peripheral Pulses, brisk capillary refill. No clubbing, cyanosis, or petal edema  NERVOUS SYSTEM: Awake and alert but unable to speak. Very weak but moves his extremities   MSK: No joint erythema, swelling or pain  SKIN: no superficial thrombophlebitis  Labs:                        8.1    9.81  )-----------( 210      ( 04 Aug 2021 00:10 )             26.3     08-04    132<L>  |  98  |  9   ----------------------------<  123<H>  4.0   |  24  |  0.56    Ca    9.0      04 Aug 2021 00:10  Phos  2.8     08-04  Mg     1.7     08-04    TPro  6.7  /  Alb  2.9<L>  /  TBili  0.6  /  DBili  x   /  AST  25  /  ALT  40  /  AlkPhos  165<H>  08-04      WBC Trend:  WBC Count: 9.81 (08-04-21 @ 00:10)  WBC Count: 10.92 (08-03-21 @ 01:03)  WBC Count: 10.43 (08-02-21 @ 00:41)  WBC Count: 13.03 (08-01-21 @ 00:32)      Creatine Trend:  Creatinine, Serum: 0.56 (08-04)  Creatinine, Serum: 0.56 (08-03)  Creatinine, Serum: 0.52 (08-02)  Creatinine, Serum: 0.51 (08-01)      Liver Biochemical Testing Trend:  Alanine Aminotransferase (ALT/SGPT): 40 (08-04)  Alanine Aminotransferase (ALT/SGPT): 47 *H* (08-03)  Alanine Aminotransferase (ALT/SGPT): 60 *H* (08-02)  Alanine Aminotransferase (ALT/SGPT): 93 *H* (08-01)  Alanine Aminotransferase (ALT/SGPT): 131 *H* (07-31)  Aspartate Aminotransferase (AST/SGOT): 25 (08-04-21 @ 00:10)  Aspartate Aminotransferase (AST/SGOT): 17 (08-03-21 @ 01:01)  Aspartate Aminotransferase (AST/SGOT): 15 (08-02-21 @ 00:41)  Aspartate Aminotransferase (AST/SGOT): 21 (08-01-21 @ 00:32)  Aspartate Aminotransferase (AST/SGOT): 43 (07-31-21 @ 00:25)  Bilirubin Total, Serum: 0.6 (08-04)  Bilirubin Total, Serum: 0.5 (08-03)  Bilirubin Total, Serum: 0.5 (08-02)  Bilirubin Total, Serum: 0.7 (08-01)  Bilirubin Total, Serum: 1.2 (07-31)      Trend LDH  08-04-21 @ 00:10  221  08-03-21 @ 01:01  217  08-02-21 @ 00:41  241  08-01-21 @ 00:32  256<H>  07-31-21 @ 00:25  295<H>          MICROBIOLOGY:    MRSA PCR Result.: NotDete (06-14-21 @ 23:02)      Culture - Sputum (collected 01 Aug 2021 17:37)  Source: .Sputum Sputum  Final Report:    Moderate Stenotrophomonas maltophilia    Normal Respiratory Bria absent  Organism: Stenotrophomonas maltophilia  Organism: Stenotrophomonas maltophilia    Sensitivities:      -  Ceftazidime: S 4      -  Levofloxacin: S <=0.5      -  Trimethoprim/Sulfamethoxazole: S <=0.5/9.5      Method Type: CLAU    Culture - Blood (collected 01 Aug 2021 17:14)  Source: .Blood Blood-Peripheral  Preliminary Report:    No growth to date.    Culture - Blood (collected 01 Aug 2021 17:14)  Source: .Blood Blood-Peripheral  Preliminary Report:    No growth to date.    Culture - Blood (collected 31 Jul 2021 20:18)  Source: .Blood Blood  Preliminary Report:    No growth to date.    Culture - Blood (collected 31 Jul 2021 20:18)  Source: .Blood Blood  Preliminary Report:    No growth to date.    Culture - Body Fluid with Gram Stain (collected 30 Jul 2021 12:00)  Source: .Body Fluid Bile Fluid  Final Report:    No growth at 5 days    Culture - Blood (collected 29 Jul 2021 03:37)  Source: .Blood Blood-Peripheral  Final Report:    No Growth Final    Culture - Blood (collected 28 Jul 2021 10:19)  Source: .Blood Blood-Venous  Final Report:    No Growth Final    Culture - Blood (collected 28 Jul 2021 10:19)  Source: .Blood Blood-Peripheral  Final Report:    No Growth Final    Culture - Blood (collected 27 Jul 2021 19:10)  Source: .Blood Blood  Final Report:    No Growth Final    HIV-1/2 Combo Result: Nonreact (01-14-21 @ 08:18)    ABS CD4: 129 /uL (04-07-20 @ 08:38)      COVID-19 PCR: NotDetec (06-14-21 @ 12:24)    COVID-19 [place] AB Interp: Positive (06-15-21 @ 10:15)  COVID-19 IgG Antibody Interpretation: Positive (01-14-21 @ 08:55)  COVID-19 IgG Antibody Interpretation: Positive (09-08-20 @ 08:50)    Lactate Dehydrogenase, Serum: 221 (08-04)  Lactate Dehydrogenase, Serum: 217 (08-03)  Lactate Dehydrogenase, Serum: 241 (08-02)  Lactate Dehydrogenase, Serum: 256 (08-01)  Lactate Dehydrogenase, Serum: 295 (07-31)  Lactate Dehydrogenase, Serum: 281 (07-30)    Blood Gas Arterial, Lactate: 0.5 (08-04 @ 11:16)  Blood Gas Arterial, Lactate: 0.5 (08-04 @ 00:00)  Blood Gas Arterial, Lactate: 0.4 (08-03 @ 13:20)  Blood Gas Arterial, Lactate: 0.7 (08-03 @ 00:25)      RADIOLOGY:  imaging below personally reviewed    < from: Xray Chest 1 View- PORTABLE-Routine (Xray Chest 1 View- PORTABLE-Routine in AM.) (08.03.21 @ 02:15) >  The heart is enlarged. The lungs appear to be clear. No pleural effusion. No pneumothorax. All life supporting devices are in good position and unchanged when compared to previous study done October 2, 2021 at 6:10 PM. Status post sternotomy.     INFECTIOUS DISEASE FOLLOW UP NOTE:    Interval History/ROS: Patient is a 66y old  Male who presents with a chief complaint of Anemia, Supratherapeutic INR, Dark Stools (04 Aug 2021 10:20)    Overnight events: Patient is afebrile and hemodynamically stable overnight. He is in a good mood today. No acute complaints.       REVIEW OF SYSTEMS:  CONSTITUTIONAL: No weakness; no fevers or chills  RESPIRATORY: ON trach collar, no cough, wheezing, hemoptysis  CARDIOVASCULAR: No chest pain or palpitations  GASTROINTESTINAL: Reports no abdominal pain; no nausea, vomiting, or hematemesis; no diarrhea or constipation; no melena or hematochezia; no change of stool caliber  GENITOURINARY: No dysuria, increased in urinary frequency or hematuria; no urethral discharge  NEUROLOGICAL: No headache/dizziness  MSK: No joint pain, erythema, or swelling; no back pain  SKIN: No itching, rashes; no recent insect bites  All other ROS negative except noted above    Prior hospital charts reviewed [Yes]  Primary team notes reviewed [Yes]  Other consultant notes reviewed [Yes]    Allergies:  No Known Allergies      ANTIMICROBIALS:   cefepime   IVPB 1000 every 8 hours  metroNIDAZOLE  IVPB 500 every 8 hours  vancomycin    Solution 125 every 12 hours      OTHER MEDS: MEDICATIONS  (STANDING):  acetaminophen    Suspension .. 650 every 6 hours PRN  aMIOdarone    Tablet 200 daily  clonazePAM  Tablet 0.5 at bedtime  dexMEDEtomidine Infusion 0.5 <Continuous>  heparin   Injectable 5000 every 8 hours  insulin lispro (ADMELOG) corrective regimen sliding scale  every 4 hours  metoclopramide Injectable 10 every 8 hours  octreotide  Injectable 50 every 8 hours  ondansetron Injectable 4 every 6 hours PRN  pantoprazole  Injectable 40 every 12 hours  polyethylene glycol 3350 17 daily  senna 2 at bedtime  simethicone 80 every 8 hours PRN      Vital Signs Last 24 Hrs  T(F): 97.9 (08-04-21 @ 08:00), Max: 100.9 (07-28-21 @ 16:00)    Vital Signs Last 24 Hrs  HR: 98 (08-04-21 @ 10:00) (76 - 127)  RR: 46 (08-04-21 @ 10:00)  SpO2: 100% (08-04-21 @ 10:00) (99% - 100%)    EXAM:  GENERAL: NAD, lying in bed comfortably  HEAD: Atraumatic, Normocephalic  EYES: EOMI, PERRLA, conjunctiva pink and cornea white  ENT: Normal external ears and nose, no discharges; moist mucous membranes, no oral thrush  NECK: Supple, nontender to palpation; no JVD: RIJ TLC removed  CHEST/LUNG: Rhonchi diffusely, good air entry  HEART: Regular rate and rhythm; No murmurs, rubs, or gallops  ABDOMEN: Soft, mildly distended, diffusely tender to palpation; no rebound tenderness, no guarding; normoactive bowel sounds; bilary drain in place; drive line in place, with zeny dressing, no surrounding erythema  EXTREMITIES:  2+ Peripheral Pulses, brisk capillary refill. No clubbing, cyanosis, or petal edema  NERVOUS SYSTEM: Awake and alert but unable to speak. Very weak but moves his extremities   MSK: No joint erythema, swelling or pain  SKIN: no superficial thrombophlebitis    Labs:                        8.1    9.81  )-----------( 210      ( 04 Aug 2021 00:10 )             26.3     08-04    132<L>  |  98  |  9   ----------------------------<  123<H>  4.0   |  24  |  0.56    Ca    9.0      04 Aug 2021 00:10  Phos  2.8     08-04  Mg     1.7     08-04    TPro  6.7  /  Alb  2.9<L>  /  TBili  0.6  /  DBili  x   /  AST  25  /  ALT  40  /  AlkPhos  165<H>  08-04      WBC Trend:  WBC Count: 9.81 (08-04-21 @ 00:10)  WBC Count: 10.92 (08-03-21 @ 01:03)  WBC Count: 10.43 (08-02-21 @ 00:41)  WBC Count: 13.03 (08-01-21 @ 00:32)      Creatine Trend:  Creatinine, Serum: 0.56 (08-04)  Creatinine, Serum: 0.56 (08-03)  Creatinine, Serum: 0.52 (08-02)  Creatinine, Serum: 0.51 (08-01)      Liver Biochemical Testing Trend:  Alanine Aminotransferase (ALT/SGPT): 40 (08-04)  Alanine Aminotransferase (ALT/SGPT): 47 *H* (08-03)  Alanine Aminotransferase (ALT/SGPT): 60 *H* (08-02)  Alanine Aminotransferase (ALT/SGPT): 93 *H* (08-01)  Alanine Aminotransferase (ALT/SGPT): 131 *H* (07-31)  Aspartate Aminotransferase (AST/SGOT): 25 (08-04-21 @ 00:10)  Aspartate Aminotransferase (AST/SGOT): 17 (08-03-21 @ 01:01)  Aspartate Aminotransferase (AST/SGOT): 15 (08-02-21 @ 00:41)  Aspartate Aminotransferase (AST/SGOT): 21 (08-01-21 @ 00:32)  Aspartate Aminotransferase (AST/SGOT): 43 (07-31-21 @ 00:25)  Bilirubin Total, Serum: 0.6 (08-04)  Bilirubin Total, Serum: 0.5 (08-03)  Bilirubin Total, Serum: 0.5 (08-02)  Bilirubin Total, Serum: 0.7 (08-01)  Bilirubin Total, Serum: 1.2 (07-31)      Trend LDH  08-04-21 @ 00:10  221  08-03-21 @ 01:01  217  08-02-21 @ 00:41  241  08-01-21 @ 00:32  256<H>  07-31-21 @ 00:25  295<H>          MICROBIOLOGY:    [facility]SA [facility] (06-14-21 @ 23:02)      Culture - Sputum (collected 01 Aug 2021 17:37)  Source: .Sputum Sputum  Final Report:    Moderate Stenotrophomonas maltophilia    Normal Respiratory Bria absent  Organism: Stenotrophomonas maltophilia  Organism: Stenotrophomonas maltophilia    Sensitivities:      -  Ceftazidime: S 4      -  Levofloxacin: S <=0.5      -  Trimethoprim/Sulfamethoxazole: S <=0.5/9.5      Method Type: CLAU    Culture - Blood (collected 01 Aug 2021 17:14)  Source: .Blood Blood-Peripheral  Preliminary Report:    No growth to date.    Culture - Blood (collected 01 Aug 2021 17:14)  Source: .Blood Blood-Peripheral  Preliminary Report:    No growth to date.    Culture - Blood (collected 31 Jul 2021 20:18)  Source: .Blood Blood  Preliminary Report:    No growth to date.    Culture - Blood (collected 31 Jul 2021 20:18)  Source: .Blood Blood  Preliminary Report:    No growth to date.    Culture - Body Fluid with Gram Stain (collected 30 Jul 2021 12:00)  Source: .Body Fluid Bile Fluid  Final Report:    No growth at 5 days    Culture - Blood (collected 29 Jul 2021 03:37)  Source: .Blood Blood-Peripheral  Final Report:    No Growth Final    Culture - Blood (collected 28 Jul 2021 10:19)  Source: .Blood Blood-Venous  Final Report:    No Growth Final    Culture - Blood (collected 28 Jul 2021 10:19)  Source: .Blood Blood-Peripheral  Final Report:    No Growth Final    Culture - Blood (collected 27 Jul 2021 19:10)  Source: .Blood Blood  Final Report:    No Growth Final    HIV-1/2 Combo Result: Nonreact (01-14-21 @ 08:18)    ABS CD4: 129 /uL (04-07-20 @ 08:38)      COVID-19 PCR: NotDetec (06-14-21 @ 12:24)    COVID-19 [place] AB Interp: Positive (06-15-21 @ 10:15)  COVID-19 IgG Antibody Interpretation: Positive (01-14-21 @ 08:55)  COVID-19 IgG Antibody Interpretation: Positive (09-08-20 @ 08:50)    Lactate Dehydrogenase, Serum: 221 (08-04)  Lactate Dehydrogenase, Serum: 217 (08-03)  Lactate Dehydrogenase, Serum: 241 (08-02)  Lactate Dehydrogenase, Serum: 256 (08-01)  Lactate Dehydrogenase, Serum: 295 (07-31)  Lactate Dehydrogenase, Serum: 281 (07-30)    Blood Gas Arterial, Lactate: 0.5 (08-04 @ 11:16)  Blood Gas Arterial, Lactate: 0.5 (08-04 @ 00:00)  Blood Gas Arterial, Lactate: 0.4 (08-03 @ 13:20)  Blood Gas Arterial, Lactate: 0.7 (08-03 @ 00:25)      RADIOLOGY:  imaging below personally reviewed    < from: Xray Chest 1 View- PORTABLE-Routine (Xray Chest 1 View- PORTABLE-Routine in AM.) (08.03.21 @ 02:15) >  The heart is enlarged. The lungs appear to be clear. No pleural effusion. No pneumothorax. All life supporting devices are in good position and unchanged when compared to previous study done October 2, 2021 at 6:10 PM. Status post sternotomy.

## 2021-08-04 NOTE — PROGRESS NOTE ADULT - SUBJECTIVE AND OBJECTIVE BOX
Interventional Radiology Follow-Up Note.     Patient seen and examined @ bedside between 7Am and 7:30 AM     This is a 66y Male s/p perc dawn on 7/30 in Interventional Radiology with Dr. Cloe.      Medication:     aMIOdarone    Tablet: (08-04)  cefepime   IVPB: (08-04)  heparin   Injectable: (08-04)  metroNIDAZOLE  IVPB: (08-04)  vancomycin    Solution: (08-04)    Vitals:   T(F): 97.9, Max: 98.8 (04:00)  HR: 98  BP: --  RR: 46  SpO2: 100%    Physical Exam:  General: Nontoxic, in NAD.  Abdomen: soft, NTND.    Drain Device: Drain intact attached to gravity drain bag. Drain with bilious out put. Dressing clean, dry, intact.     24hr Drain output: 275cc    LABS:  Aspartate Aminotransferase (AST/SGOT): 25 U/L (08-04-21 @ 00:10)  Alanine Aminotransferase (ALT/SGPT): 40 U/L (08-04-21 @ 00:10)  Aspartate Aminotransferase (AST/SGOT): 17 U/L (08-03-21 @ 01:01)  Alanine Aminotransferase (ALT/SGPT): 47 U/L (08-03-21 @ 01:01)        LABS:  Na: 132 (08-04 @ 00:10), 131 (08-03 @ 01:01), 133 (08-02 @ 00:41)  K: 4.0 (08-04 @ 00:10), 4.2 (08-03 @ 01:01), 4.1 (08-02 @ 00:41)  Cl: 98 (08-04 @ 00:10), 99 (08-03 @ 01:01), 101 (08-02 @ 00:41)  CO2: 24 (08-04 @ 00:10), 24 (08-03 @ 01:01), 23 (08-02 @ 00:41)  BUN: 9 (08-04 @ 00:10), 11 (08-03 @ 01:01), 12 (08-02 @ 00:41)  Cr: 0.56 (08-04 @ 00:10), 0.56 (08-03 @ 01:01), 0.52 (08-02 @ 00:41)  Glu: 123(08-04 @ 00:10), 133(08-03 @ 01:01), 184(08-02 @ 00:41)    Hgb: 8.1 (08-04 @ 00:10), 7.9 (08-03 @ 01:03), 7.9 (08-02 @ 00:41)  Hct: 26.3 (08-04 @ 00:10), 26.1 (08-03 @ 01:03), 25.7 (08-02 @ 00:41)  WBC: 9.81 (08-04 @ 00:10), 10.92 (08-03 @ 01:03), 10.43 (08-02 @ 00:41)  Plt: 210 (08-04 @ 00:10), 191 (08-03 @ 01:03), 168 (08-02 @ 00:41)    INR: 1.42 08-02-21 @ 00:41  PTT: 27.7 08-02-21 @ 00:41          LIVER FUNCTIONS - ( 04 Aug 2021 00:10 )  Alb: 2.9 g/dL / Pro: 6.7 g/dL / ALK PHOS: 165 U/L / ALT: 40 U/L / AST: 25 U/L / GGT: x           ABG - ( 04 Aug 2021 11:16 )  pH, Arterial: 7.39  pH, Blood: x     /  pCO2: 47    /  pO2: 167   / HCO3: 28    / Base Excess: 3.0   /  SaO2: 100         Culture - Body Fluid with Gram Stain (07.30.21 @ 12:00)    Gram Stain:   No polymorphonuclear leukocytes per low power field  No organisms seen    Specimen Source: .Body Fluid Bile Fluid    Culture Results:   No growth at 5 days         Assessment/Plan:   65 YO M with a history of stage D NICM s/p HM2 on 9/2017 as DT (due to severe PAD) with TV ring, prior COVID-19 infection 4/2020, recurrent syncope post LVAD s/p ILR, and chronic abdominal pain with prior negative workup who was admitted with symptomatic anemia with Hgb 4.5 in setting of INR 8.8 without hemodynamic instability. He was transfused and underwent VCE which showed active bleeding in the mid small bowel but subsequent enteroscopy 6/15 did not reveal any active bleeding. He acutely decompensated after procedure with fever/hypertension, low flow alarms, and pulmonary infiltrate with hypoxia requiring intubation from probable aspiration PNA. His LDH is normal and there are no signs of overt LVAD dysfunction on echo though signs of insufficiently unloaded ventricle for which his speed has been increased. Course notable for inability to wean ventilator with persistent secretions for which he underwent tracheostomy as well as acute c diff colitis. Worsening anemia over last few days requiring transfusion with low flow alarms, concerning for recurrent GI bleed. Now w/ hypotension and fevers. Pt found to have acute cholecystitis s/p cholecystostomy drain on  7/30. Afebrile.    - no growth on culture.  - on IV abx.  - Flush drain as ordered; DO NOT aspirate  - Change dressing q3 days or when dressing is saturated.  -  Monitor h/h; transfuse as needed  - Trend vs/labs  - Continue global management per primary team  - The patient will need surgical follow up to decide if he is a surgical candidate after he is stable.  If he has cholecystectomy surgery the drain will be removed at the time of surgery. He should follow up with IR for initial cholecystostomy tube check 4-6 weeks after placement.  After initial follow up with IR if the drainage catheter remains in then he should have routine exchange of drainage every 3 months.   - Pt will benefit from VNS service to help with drainage catheter care; Pt should continue same drainage catheter care as an outpatient.   - D/w Dr. carolyn marino.            Please call IR at  [phone] with any questions, concerns, or issues regarding above.    Also available on Teams.

## 2021-08-04 NOTE — PROGRESS NOTE ADULT - SUBJECTIVE AND OBJECTIVE BOX
RICKY HAMPTON  MRN-[id number]  Patient is a 66y old  Male who presents with a chief complaint of Anemia, Supratherapeutic INR, Dark Stools (04 Aug 2021 10:20)    HPI:  65M PMH ACC/AHA stage D HF due to NICM HM2 LVAD , TV annuloplasty ring 17 as destination therapy due to severe peripheral artery disease with significant stenosis  SIADH, Depression, CKD-3 with hyperkalemia, past E. coli UTIs, driveline drainage (21) and COVID-19 (back in 2020)  He was recently seen in clinic where he complained of abdominal pain and dark stools w constipation back in May. He presents to [facility] ER today weakness and fatigue, moderate and + Black stools for three days, on coumadin secondary to warfarin use in the setting of an LVAD. Patient has required transfusions for GIB in the past. Mostly recently back in 2021 pt had anemia with dark stools. No interventions was done at that time. However Last Endoscopy was done in 2020 (negative). Today labs show patient is anemic with H/H of 4.5/16.3,. INR is 8.84 MAP in the 90s, Temp 35.1. He denies any chest pain, shortness of breath, dizziness, abd pain, nausea or vomiting.       (2021 16:57)      Surgery/Hospital Course:   admit for melena w/ anemia, INR 8.84   6/15 Capsul study (+) for small bowel bleed, balloon endoscopy (old blood in prox ileum); post EGD - septic w/ L opacity, re-intubated for concern for aspiration, TTE (Mod MR, decrease biV w/ interventricular septum boweing towards R)   bronch    +C Diff    TC    CT C/A/P: Fluid filled colon which may be 2/2 rapid transit. Small bilateral pleffs with associates. Compressive atelectasis New ISABELLE & LLL  parenchymal opacities, suspicious for pneumonia. 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    #8 Shiley trach at bedside    CPAP trials    LVAD speed increased to 9200   Bronch; Central line dc'ed   TC since , continue as tolerated. Patient transferred to SDU.    Cont PT, no trach downsize today(#8cuffed)/ Anticoagulation/ Continue TF, speech f/u/ Vanco taper    oob to chair  INR  2.47  5 mg coumadin     increased lop to 25 q8  per HF   INR today 2.64.  H&H 7.3/24 this AM.  Will repeat CBC at noon, and will send stool guaiac Patient with persistent abdominal tenderness, rate of tube feeds decreased.  No nausea/vomiting.     INR today 2.4H&H 9.1/.6 low flow overnight /N&V  NPO resolved for capsule study  Coumadin on hold refusing Tube feeds on D5  normal  @50 cc/hr   INR 2.69  H&H 7..1 refusing Tube feeds on D5 2 normal  @50 cc/hr. This am + BM Anusha Oneill HF  aware- PRBC x1  GI team consulted -  NPO  plan on study in am-  D/w Dr Cadet Patient  to return to CTU for further management; 1PRBCS    Post op INR 2.2 today.  No bleeding. BC + for SM.  Pt is hypotensive requiring pressor and inotropic support.  ID follow up today on Cefepime will follow.   R PTC for PTX    CT C/A/P: sub q emphysema in R chest wall, GGO RUL, small ascites CTH negative; Abd US: GB thickening, pericholecystic fluid     Perchole drain in place continues to drain total output overnight 133.  Fever today 38.8 given tylenol.  Will repeat BC now.     duplex LE negative     Today:  Continue TC vent weaning as tolerated. 2 episodes of vomiting this AM, plan to hold tube feeds and consult GI to follow up.     REVIEW OF SYSTEMS:  Unable to obtain secondary to pt being trached    ICU Vital Signs Last 24 Hrs  T(C): 36.6 (04 Aug 2021 08:00), Max: 37.1 (03 Aug 2021 16:00)  T(F): 97.9 (04 Aug 2021 08:00), Max: 98.8 (04 Aug 2021 04:00)  HR: 98 (04 Aug 2021 10:00) (76 - 127)  BP: --  BP(mean): --  ABP: 94/67 (04 Aug 2021 10:00) (83/66 - 134/88)  ABP(mean): 77 (04 Aug 2021 10:00) (73 - 105)  RR: 46 (04 Aug 2021 10:00) (18 - 54)  SpO2: 100% (04 Aug 2021 10:00) (99% - 100%)      Physical Exam:  Gen:  awake and alert    CNS:  intact	  Neck: no JVD, +TC   RES : clear , no wheezing              CVS: +LVAD hum   Abd: Soft, +LLQ tender to palpation   Skin: No rash.  Ext:  no edema    ============================I/O===========================   I&O's Detail    03 Aug 2021 07:01  -  04 Aug 2021 07:00  --------------------------------------------------------  IN:    dextrose 5% + sodium chloride 0.45%: 825 mL    Enteral Tube Flush: 200 mL    IV PiggyBack: 200 mL    IV PiggyBack: 300 mL    Miscellaneous Tube Feedin mL    sodium chloride 0.9%: 120 mL  Total IN: 1885 mL    OUT:    Dexmedetomidine: 0 mL    Drain (mL): 275 mL    Voided (mL): 2050 mL  Total OUT: 2325 mL    Total NET: -440 mL      04 Aug 2021 07:01  -  04 Aug 2021 11:04  --------------------------------------------------------  IN:    dextrose 5% + sodium chloride 0.45%: 300 mL    sodium chloride 0.9%: 20 mL  Total IN: 320 mL    OUT:    Dexmedetomidine: 0 mL    Miscellaneous Tube Feedin mL    Voided (mL): 200 mL  Total OUT: 200 mL    Total NET: 120 mL        ============================ LABS =========================                        8.1    9.81  )-----------( 210      ( 04 Aug 2021 00:10 )             26.3     08-04    132<L>  |  98  |  9   ----------------------------<  123<H>  4.0   |  24  |  0.56    Ca    9.0      04 Aug 2021 00:10  Phos  2.8     08-04  Mg     1.7     08-04    TPro  6.7  /  Alb  2.9<L>  /  TBili  0.6  /  DBili  x   /  AST  25  /  ALT  40  /  AlkPhos  165<H>  08-04    LIVER FUNCTIONS - ( 04 Aug 2021 00:10 )  Alb: 2.9 g/dL / Pro: 6.7 g/dL / ALK PHOS: 165 U/L / ALT: 40 U/L / AST: 25 U/L / GGT: x             ABG - ( 04 Aug 2021 00:00 )  pH, Arterial: 7.47  pH, Blood: x     /  pCO2: 38    /  pO2: 164   / HCO3: 27    / Base Excess: 3.4   /  SaO2: 100                 ======================Micro/Rad/Cardio=================  Culture: Reviewed   CXR: Reviewed  Echo:Reviewed  ======================================================  PAST MEDICAL & SURGICAL HISTORY:  CHF (congestive heart failure)    CAD (coronary artery disease)    Depression    Pleural effusion    History of 2019 novel coronavirus disease (COVID-19)  2020    Hemorrhoids    Bleeding hemorrhoids    Peripheral arterial disease    Claudication    BPH with urinary obstruction    ACC/AHA stage D systolic heart failure    Anticoagulation goal of INR 2.0 to 2.5    Falls    Clavicle fracture    CKD (chronic kidney disease), stage III    Iron deficiency anemia    H/O epistaxis    Vertigo    GI bleed    S/P TVR (tricuspid valve repair)    S/P ventricular assist device    S/P endoscopy      ====================ASSESSMENT ==============  Stage D Nonischemic Cardiomyopathy, Status Post HM2 on 2017    Cardiogenic shock  Hemodynamic instability   Acute hypoxemic respiratory failure  GI bleed , Status Post Enteroscopy   Anemia, in setting of melena   Leukocytosis   Chronic Kidney Disease  Stress hyperglycemia   C.diff positive on    Hypovolemic shock  Septic shock    Plan:  ====================== NEUROLOGY=====================  CTH on  with no acute intracranial hemorrhage or acute territorial infarction   Tylenol PRN for analgesia   Sleep regimen with clonazepam     acetaminophen    Suspension .. 650 milliGRAM(s) Oral every 6 hours PRN Mild Pain (1 - 3)  clonazePAM  Tablet 0.5 milliGRAM(s) Oral at bedtime    ==================== RESPIRATORY======================  Acute hypoxemic respiratory failure s/p #8 shiley trach on ; CPAP/TC vent weaning as tolerated.  Requiring intermittent full vent support, continue close monitoring of respiratory rate and breathing pattern, following of ABG’s with A-line monitoring, continuous pulse oximetry monitoring.   CT chest : Sub q emphysema in R chest wall, GGO RUL     Mechanical Ventilation:  Mode: trach collar    ====================CARDIOVASCULAR==================  Stage D Nonischemic Cardiomyopathy, Status Post HM2 on 2017; LVAD settings 9200 with flow of 5.9   TTE : reveals at least moderate MR, mild AI, severe global LV syst dysfxn, dec RV fxn.  Continue invasive hemodynamic monitoring   Rate control with amiodarone     aMIOdarone    Tablet 200 milliGRAM(s) Oral daily    ===================HEMATOLOGIC/ONC ===================  Anemia due to acute blood loss associated with upper GI bleed   Monitor H&H/Plts closely - no AC or AP as heart failure     VTE prophylaxis, heparin   Injectable 5000 Unit(s) SubCutaneous every 8 hours    ===================== RENAL =========================  Continue monitoring urine output, I&OS, BUN/Cr   Replete lytes PRN. Keep K> 4 and Mg >2     ==================== GASTROINTESTINAL===================  2 episodes of vomiting this AM, plan to hold tube feeds and consult GI to follow up   GI bleed in setting of recent melena, GI following -  Protonix IVP BID, c/w octreotide   As per GI, not stable for endoscopic evaluation, transfuse prn and f/u repeat CBC. May consider enteroscopy when stabilized.   CT A/P on : small ascites; ABD US showing GB wall thickening w/ surrounding pericholecystic fluid  S/p cholecystostomy tube placed on : with IR with -60cc of black bile, will monitor site closely.   C.diff + on , continue Vanco   Bowel regimen with Miralax and Senna   Simethicone PRN dyspepsia   Reglan for gut motility   Zofran PRN for nausea     dextrose 5% + sodium chloride 0.45%. 1000 milliLiter(s) (75 mL/Hr) IV Continuous <Continuous>  pantoprazole  Injectable 40 milliGRAM(s) IV Push every 12 hours  polyethylene glycol 3350 17 Gram(s) Oral daily  senna 2 Tablet(s) Oral at bedtime  simethicone 80 milliGRAM(s) Chew every 8 hours PRN Dyspepsia  sodium chloride 0.9%. 1000 milliLiter(s) (5 mL/Hr) IV Continuous <Continuous>  octreotide  Injectable 50 MICROGram(s) IV Push every 8 hours  metoclopramide Injectable 10 milliGRAM(s) IV Push every 8 hours  ondansetron Injectable 4 milliGRAM(s) IV Push every 6 hours PRN Nausea and/or Vomiting    =======================    ENDOCRINE  =====================  Stress hyperglycemia, continue glucose control with admelog sliding scale     insulin lispro (ADMELOG) corrective regimen sliding scale   SubCutaneous every 4 hours    ========================INFECTIOUS DISEASE================  Afebrile, WBC downtrending 10.92->9.81   Continue trending WBC and monitoring fever curve   SCX on  serratia and stenotro, SCx on  stenotro   BCx+  +Serratia marcescens c/w cefepime and metronidazole   Per ID, will consider adding bactrim if clinical condition worsens.   C.diff + on , continue vanco     cefepime   IVPB 1000 milliGRAM(s) IV Intermittent every 8 hours  metroNIDAZOLE  IVPB 500 milliGRAM(s) IV Intermittent every 8 hours  vancomycin    Solution 125 milliGRAM(s) Oral every 12 hours      Patient requires continuous monitoring with bedside rhythm monitoring, pulse ox monitoring, and intermittent blood gas analysis. Care plan discussed with ICU care team. Patient remained critical and at risk for life threatening decompensation.     By signing my name below, I, Agnieszka Cherry, attest that this documentation has been prepared under the direction and in the presence of CLAUDIA Mejia   Electronically signed: Romel Shaffer, 21 @ 11:04    I, Go Sellers, personally performed the services described in this documentation. all medical record entries made by the romel were at my direction and in my presence. I have reviewed the chart and agree that the record reflects my personal performance and is accurate and complete  Electronically signed: CLAUDIA Mejia        RICKY HAMPTON  MRN-[id number]  Patient is a 66y old  Male who presents with a chief complaint of Anemia, Supratherapeutic INR, Dark Stools (04 Aug 2021 10:20)    HPI:  65M PMH ACC/AHA stage D HF due to NICM HM2 LVAD , TV annuloplasty ring 17 as destination therapy due to severe peripheral artery disease with significant stenosis  SIADH, Depression, CKD-3 with hyperkalemia, past E. coli UTIs, driveline drainage (21) and COVID-19 (back in 2020)  He was recently seen in clinic where he complained of abdominal pain and dark stools w constipation back in May. He presents to [facility] ER today weakness and fatigue, moderate and + Black stools for three days, on coumadin secondary to warfarin use in the setting of an LVAD. Patient has required transfusions for GIB in the past. Mostly recently back in 2021 pt had anemia with dark stools. No interventions was done at that time. However Last Endoscopy was done in 2020 (negative). Today labs show patient is anemic with H/H of 4.5/16.3,. INR is 8.84 MAP in the 90s, Temp 35.1. He denies any chest pain, shortness of breath, dizziness, abd pain, nausea or vomiting.     (2021 16:57)    Surgery/Hospital Course:   admit for melena w/ anemia, INR 8.84   6/15 Capsul study (+) for small bowel bleed, balloon endoscopy (old blood in prox ileum); post EGD - septic w/ L opacity, re-intubated for concern for aspiration, TTE (Mod MR, decrease biV w/ interventricular septum boweing towards R)   bronch    +C Diff    TC    CT C/A/P: Fluid filled colon which may be 2/2 rapid transit. Small bilateral pleffs with associates. Compressive atelectasis New ISABELLE & LLL  parenchymal opacities, suspicious for pneumonia. 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    #8 Shiley trach at bedside    CPAP trials    LVAD speed increased to 9200   Bronch; Central line dc'ed   TC since , continue as tolerated. Patient transferred to SDU.    Cont PT, no trach downsize today(#8cuffed)/ Anticoagulation/ Continue TF, speech f/u/ Vanco taper    oob to chair  INR  2.47  5 mg coumadin     increased lop to 25 q8  per HF   INR today 2.64.  H&H 7.3/24 this AM.  Will repeat CBC at noon, and will send stool guaiac Patient with persistent abdominal tenderness, rate of tube feeds decreased.  No nausea/vomiting.     INR today 2.4H&H 9.1/.6 low flow overnight /N&V  NPO resolved for capsule study  Coumadin on hold refusing Tube feeds on D5 / normal  @50 cc/hr   INR 2.69  H&H 7..1 refusing Tube feeds on D5 2 normal  @50 cc/hr. This am + BM Anusha Oneill HF  aware- PRBC x1  GI team consulted -  NPO  plan on study in am-  D/w Dr Cadet Patient  to return to CTU for further management; 1PRBCS    Post op INR 2.2 today.  No bleeding. BC + for SM.  Pt is hypotensive requiring pressor and inotropic support.  ID follow up today on Cefepime will follow.   R PTC for PTX    CT C/A/P: sub q emphysema in R chest wall, GGO RUL, small ascites CTH negative; Abd US: GB thickening, pericholecystic fluid     Perchole drain in place continues to drain total output overnight 133.  Fever today 38.8 given tylenol.  Will repeat BC now.     duplex LE negative     Today:  Continue TC vent weaning as tolerated. 2 episodes of vomiting this AM, tube feeds held and GI notified. Patient had been coughing     REVIEW OF SYSTEMS:  Unable to obtain secondary to pt being trached    ICU Vital Signs Last 24 Hrs  T(C): 36.6 (04 Aug 2021 08:00), Max: 37.1 (03 Aug 2021 16:00)  T(F): 97.9 (04 Aug 2021 08:00), Max: 98.8 (04 Aug 2021 04:00)  HR: 98 (04 Aug 2021 10:00) (76 - 127)  BP: --  BP(mean): --  ABP: 94/67 (04 Aug 2021 10:00) (83/66 - 134/88)  ABP(mean): 77 (04 Aug 2021 10:00) (73 - 105)  RR: 46 (04 Aug 2021 10:00) (18 - 54)  SpO2: 100% (04 Aug 2021 10:00) (99% - 100%)      Physical Exam:  Gen:  awake and alert    CNS:  intact	  Neck: no JVD, +TC   RES : clear , no wheezing              CVS: +LVAD hum   Abd: Soft, +LLQ tender to palpation   Skin: No rash.  Ext:  no edema    ============================I/O===========================   I&O's Detail    03 Aug 2021 07:01  -  04 Aug 2021 07:00  --------------------------------------------------------  IN:    dextrose 5% + sodium chloride 0.45%: 825 mL    Enteral Tube Flush: 200 mL    IV PiggyBack: 200 mL    IV PiggyBack: 300 mL    Miscellaneous Tube Feedin mL    sodium chloride 0.9%: 120 mL  Total IN: 1885 mL    OUT:    Dexmedetomidine: 0 mL    Drain (mL): 275 mL    Voided (mL): 2050 mL  Total OUT: 2325 mL    Total NET: -440 mL      04 Aug 2021 07:01  -  04 Aug 2021 11:04  --------------------------------------------------------  IN:    dextrose 5% + sodium chloride 0.45%: 300 mL    sodium chloride 0.9%: 20 mL  Total IN: 320 mL    OUT:    Dexmedetomidine: 0 mL    Miscellaneous Tube Feedin mL    Voided (mL): 200 mL  Total OUT: 200 mL    Total NET: 120 mL        ============================ LABS =========================                        8.1    9.81  )-----------( 210      ( 04 Aug 2021 00:10 )             26.3     08-04    132<L>  |  98  |  9   ----------------------------<  123<H>  4.0   |  24  |  0.56    Ca    9.0      04 Aug 2021 00:10  Phos  2.8     08-04  Mg     1.7     08-04    TPro  6.7  /  Alb  2.9<L>  /  TBili  0.6  /  DBili  x   /  AST  25  /  ALT  40  /  AlkPhos  165<H>  08-04    LIVER FUNCTIONS - ( 04 Aug 2021 00:10 )  Alb: 2.9 g/dL / Pro: 6.7 g/dL / ALK PHOS: 165 U/L / ALT: 40 U/L / AST: 25 U/L / GGT: x             ABG - ( 04 Aug 2021 00:00 )  pH, Arterial: 7.47  pH, Blood: x     /  pCO2: 38    /  pO2: 164   / HCO3: 27    / Base Excess: 3.4   /  SaO2: 100                 ======================Micro/Rad/Cardio=================  Culture: Reviewed   CXR: Reviewed  Echo:Reviewed  ======================================================  PAST MEDICAL & SURGICAL HISTORY:  CHF (congestive heart failure)    CAD (coronary artery disease)    Depression    Pleural effusion    History of 2019 novel coronavirus disease (COVID-19)  2020    Hemorrhoids    Bleeding hemorrhoids    Peripheral arterial disease    Claudication    BPH with urinary obstruction    ACC/AHA stage D systolic heart failure    Anticoagulation goal of INR 2.0 to 2.5    Falls    Clavicle fracture    CKD (chronic kidney disease), stage III    Iron deficiency anemia    H/O epistaxis    Vertigo    GI bleed    S/P TVR (tricuspid valve repair)    S/P ventricular assist device    S/P endoscopy      ====================ASSESSMENT ==============  Stage D Nonischemic Cardiomyopathy, Status Post HM2 on 2017    Cardiogenic shock  Hemodynamic instability   Acute hypoxemic respiratory failure  GI bleed , Status Post Enteroscopy   Anemia, in setting of melena   Leukocytosis   Chronic Kidney Disease  Stress hyperglycemia   C.diff positive on    Hypovolemic shock  Septic shock    Plan:  ====================== NEUROLOGY=====================  CTH on  with no acute intracranial hemorrhage or acute territorial infarction   Tylenol PRN for analgesia   Sleep regimen with clonazepam     acetaminophen    Suspension .. 650 milliGRAM(s) Oral every 6 hours PRN Mild Pain (1 - 3)  clonazePAM  Tablet 0.5 milliGRAM(s) Oral at bedtime    ==================== RESPIRATORY======================  Acute hypoxemic respiratory failure s/p #8 shiley trach on ; CPAP/TC vent weaning as tolerated.  Requiring intermittent full vent support, continue close monitoring of respiratory rate and breathing pattern, following of ABG’s with A-line monitoring, continuous pulse oximetry monitoring.   CT chest : Sub q emphysema in R chest wall, GGO RUL     Mechanical Ventilation:  Mode: trach collar    ====================CARDIOVASCULAR==================  Stage D Nonischemic Cardiomyopathy, Status Post HM2 on 2017; LVAD settings 9200 with flow of 5.9   TTE : reveals at least moderate MR, mild AI, severe global LV syst dysfxn, dec RV fxn.  Continue invasive hemodynamic monitoring   Rate control with amiodarone     aMIOdarone    Tablet 200 milliGRAM(s) Oral daily    ===================HEMATOLOGIC/ONC ===================  Anemia due to acute blood loss associated with upper GI bleed   Monitor H&H/Plts closely - no AC or AP as heart failure     VTE prophylaxis, heparin   Injectable 5000 Unit(s) SubCutaneous every 8 hours    ===================== RENAL =========================  Continue monitoring urine output, I&OS, BUN/Cr   Replete lytes PRN. Keep K> 4 and Mg >2     ==================== GASTROINTESTINAL===================  2 episodes of vomiting this AM, plan to hold tube feeds and consult GI to follow up   GI bleed in setting of recent melena, GI following -  Protonix IVP BID, c/w octreotide   As per GI, not stable for endoscopic evaluation, transfuse prn and f/u repeat CBC. May consider enteroscopy when stabilized.   CT A/P on : small ascites; ABD US showing GB wall thickening w/ surrounding pericholecystic fluid  S/p cholecystostomy tube placed on : with IR with -60cc of black bile, will monitor site closely.   C.diff + on , continue Vanco   Bowel regimen with Miralax and Senna   Simethicone PRN dyspepsia   Reglan for gut motility   Zofran PRN for nausea     dextrose 5% + sodium chloride 0.45%. 1000 milliLiter(s) (75 mL/Hr) IV Continuous <Continuous>  pantoprazole  Injectable 40 milliGRAM(s) IV Push every 12 hours  polyethylene glycol 3350 17 Gram(s) Oral daily  senna 2 Tablet(s) Oral at bedtime  simethicone 80 milliGRAM(s) Chew every 8 hours PRN Dyspepsia  sodium chloride 0.9%. 1000 milliLiter(s) (5 mL/Hr) IV Continuous <Continuous>  octreotide  Injectable 50 MICROGram(s) IV Push every 8 hours  metoclopramide Injectable 10 milliGRAM(s) IV Push every 8 hours  ondansetron Injectable 4 milliGRAM(s) IV Push every 6 hours PRN Nausea and/or Vomiting    =======================    ENDOCRINE  =====================  Stress hyperglycemia, continue glucose control with admelog sliding scale     insulin lispro (ADMELOG) corrective regimen sliding scale   SubCutaneous every 4 hours    ========================INFECTIOUS DISEASE================  Afebrile, WBC downtrending 10.92->9.81   Continue trending WBC and monitoring fever curve   SCX on  serratia and stenotro, SCx on  stenotro   BCx+  +Serratia marcescens c/w cefepime and metronidazole   Per ID, will consider adding bactrim if clinical condition worsens.   C.diff + on , continue vanco     cefepime   IVPB 1000 milliGRAM(s) IV Intermittent every 8 hours  metroNIDAZOLE  IVPB 500 milliGRAM(s) IV Intermittent every 8 hours  vancomycin    Solution 125 milliGRAM(s) Oral every 12 hours      Patient requires continuous monitoring with bedside rhythm monitoring, pulse ox monitoring, and intermittent blood gas analysis. Care plan discussed with ICU care team. Patient remained critical and at risk for life threatening decompensation.     By signing my name below, IAgnieszka, attest that this documentation has been prepared under the direction and in the presence of CLAUDIA Mejia   Electronically signed: Romel Shaffer, 21 @ 11:04    I, Go Sellers, personally performed the services described in this documentation. all medical record entries made by the romel were at my direction and in my presence. I have reviewed the chart and agree that the record reflects my personal performance and is accurate and complete  Electronically signed: CLAUDIA Mejia        RICKY HAMPTON  MRN-[id number]  Patient is a 66y old  Male who presents with a chief complaint of Anemia, Supratherapeutic INR, Dark Stools (04 Aug 2021 10:20)    HPI:  65M PMH ACC/AHA stage D HF due to NICM HM2 LVAD , TV annuloplasty ring 17 as destination therapy due to severe peripheral artery disease with significant stenosis  SIADH, Depression, CKD-3 with hyperkalemia, past E. coli UTIs, driveline drainage (21) and COVID-19 (back in 2020)  He was recently seen in clinic where he complained of abdominal pain and dark stools w constipation back in May. He presents to [facility] ER today weakness and fatigue, moderate and + Black stools for three days, on coumadin secondary to warfarin use in the setting of an LVAD. Patient has required transfusions for GIB in the past. Mostly recently back in 2021 pt had anemia with dark stools. No interventions was done at that time. However Last Endoscopy was done in 2020 (negative). Today labs show patient is anemic with H/H of 4.5/16.3,. INR is 8.84 MAP in the 90s, Temp 35.1. He denies any chest pain, shortness of breath, dizziness, abd pain, nausea or vomiting.     (2021 16:57)    Surgery/Hospital Course:   admit for melena w/ anemia, INR 8.84   6/15 Capsul study (+) for small bowel bleed, balloon endoscopy (old blood in prox ileum); post EGD - septic w/ L opacity, re-intubated for concern for aspiration, TTE (Mod MR, decrease biV w/ interventricular septum boweing towards R)   bronch    +C Diff    TC    CT C/A/P: Fluid filled colon which may be 2/2 rapid transit. Small bilateral pleffs with associates. Compressive atelectasis New ISABELLE & LLL  parenchymal opacities, suspicious for pneumonia. 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    #8 Shiley trach at bedside    CPAP trials    LVAD speed increased to 9200   Bronch; Central line dc'ed   TC since , continue as tolerated. Patient transferred to SDU.    Cont PT, no trach downsize today(#8cuffed)/ Anticoagulation/ Continue TF, speech f/u/ Vanco taper    oob to chair  INR  2.47  5 mg coumadin     increased lop to 25 q8  per HF   INR today 2.64.  H&H 7.3/24 this AM.  Will repeat CBC at noon, and will send stool guaiac Patient with persistent abdominal tenderness, rate of tube feeds decreased.  No nausea/vomiting.     INR today 2.4H&H 9.1/.6 low flow overnight /N&V  NPO resolved for capsule study  Coumadin on hold refusing Tube feeds on D5 / normal  @50 cc/hr   INR 2.69  H&H 7..1 refusing Tube feeds on D5 2 normal  @50 cc/hr. This am + BM Anusha Oneill HF  aware- PRBC x1  GI team consulted -  NPO  plan on study in am-  D/w Dr Cadet Patient  to return to CTU for further management; 1PRBCS    Post op INR 2.2 today.  No bleeding. BC + for SM.  Pt is hypotensive requiring pressor and inotropic support.  ID follow up today on Cefepime will follow.   R PTC for PTX    CT C/A/P: sub q emphysema in R chest wall, GGO RUL, small ascites CTH negative; Abd US: GB thickening, pericholecystic fluid     Perchole drain in place continues to drain total output overnight 133.  Fever today 38.8 given tylenol.  Will repeat BC now.     duplex LE negative     Today:  Continue TC vent weaning as tolerated. 2 episodes of vomiting this AM, tube feeds held and GI notified. Patient had been coughing while on full vent support as well as CPAP mode. Patient has had much less nausea and vomiting on T/C.    REVIEW OF SYSTEMS:  Unable to obtain secondary to pt being trached    ICU Vital Signs Last 24 Hrs  T(C): 36.6 (04 Aug 2021 08:00), Max: 37.1 (03 Aug 2021 16:00)  T(F): 97.9 (04 Aug 2021 08:00), Max: 98.8 (04 Aug 2021 04:00)  HR: 98 (04 Aug 2021 10:00) (76 - 127)  ABP: 94/67 (04 Aug 2021 10:00) (83/66 - 134/88)  ABP(mean): 77 (04 Aug 2021 10:00) (73 - 105)  RR: 46 (04 Aug 2021 10:00) (18 - 54)  SpO2: 100% (04 Aug 2021 10:00) (99% - 100%)    Physical Exam:  Gen:  awake and alert    CNS:  intact	  Neck: no JVD, +TC   RES : clear , no wheezing              CVS: +LVAD hum   Abd: Soft, +LLQ tender to palpation   Skin: No rash.  Ext:  no edema    ============================I/O===========================   I&O's Detail    03 Aug 2021 07:01  -  04 Aug 2021 07:00  --------------------------------------------------------  IN:    dextrose 5% + sodium chloride 0.45%: 825 mL    Enteral Tube Flush: 200 mL    IV PiggyBack: 200 mL    IV PiggyBack: 300 mL    Miscellaneous Tube Feedin mL    sodium chloride 0.9%: 120 mL  Total IN: 1885 mL    OUT:    Dexmedetomidine: 0 mL    Drain (mL): 275 mL    Voided (mL): 2050 mL  Total OUT: 2325 mL    Total NET: -440 mL    04 Aug 2021 07:01  -  04 Aug 2021 11:04  --------------------------------------------------------  IN:    dextrose 5% + sodium chloride 0.45%: 300 mL    sodium chloride 0.9%: 20 mL  Total IN: 320 mL    OUT:    Dexmedetomidine: 0 mL    Miscellaneous Tube Feedin mL    Voided (mL): 200 mL  Total OUT: 200 mL    Total NET: 120 mL    =========================== LABS =========================                        8.1    9.81  )-----------( 210      ( 04 Aug 2021 00:10 )             26.3     132<L>  |  98  |  9   ----------------------------<  123<H>  4.0   |  24  |  0.56    Ca    9.0      04 Aug 2021 00:10  Phos  2.8     08-  Mg     1.7     08-    TPro  6.7  /  Alb  2.9<L>  /  TBili  0.6  /  DBili  x   /  AST  25  /  ALT  40  /  AlkPhos  165<H>  08-04    LIVER FUNCTIONS - ( 04 Aug 2021 00:10 )    Alb: 2.9 g/dL / Pro: 6.7 g/dL / ALK PHOS: 165 U/L / ALT: 40 U/L / AST: 25 U/L / GGT: x           ABG - ( 04 Aug 2021 00:00 )  pH, Arterial: 7.47  pH, Blood: x     /  pCO2: 38    /  pO2: 164   / HCO3: 27    / Base Excess: 3.4   /  SaO2: 100       ======================Micro/Rad/Cardio=================  Culture: Reviewed   CXR: Reviewed  Echo: Reviewed    ======================================================  PAST MEDICAL & SURGICAL HISTORY:  CHF (congestive heart failure)    CAD (coronary artery disease)    Depression    Pleural effusion    History of 2019 novel coronavirus disease (COVID-19)  2020    Hemorrhoids    Bleeding hemorrhoids    Peripheral arterial disease    Claudication    BPH with urinary obstruction    ACC/AHA stage D systolic heart failure    Anticoagulation goal of INR 2.0 to 2.5    Falls    Clavicle fracture    CKD (chronic kidney disease), stage III    Iron deficiency anemia    H/O epistaxis    Vertigo    GI bleed    S/P TVR (tricuspid valve repair)    S/P ventricular assist device    S/P endoscopy    ====================ASSESSMENT ==============  Stage D Nonischemic Cardiomyopathy, Status Post HM2 on 2017    Cardiogenic shock  Hemodynamic instability   Acute hypoxemic respiratory failure  GI bleed , Status Post Enteroscopy   Anemia, in setting of melena   Leukocytosis   Chronic Kidney Disease  Stress hyperglycemia   C.diff positive on    Hypovolemic shock  Septic shock    Plan:  ====================== NEUROLOGY=====================  CTH on  with no acute intracranial hemorrhage or acute territorial infarction   Tylenol PRN for analgesia   Sleep regimen with clonazepam     acetaminophen    Suspension .. 650 milliGRAM(s) Oral every 6 hours PRN Mild Pain (1 - 3)  clonazePAM  Tablet 0.5 milliGRAM(s) Oral at bedtime    ==================== RESPIRATORY======================  Acute hypoxemic respiratory failure s/p #8 shiley trach on ; CPAP/TC vent weaning as tolerated.  Requiring intermittent full vent support, continue close monitoring of respiratory rate and breathing pattern, following of ABG’s with A-line monitoring, continuous pulse oximetry monitoring.   CT chest : Sub q emphysema in R chest wall, GGO RUL     Mechanical Ventilation:  Mode: trach collar    ====================CARDIOVASCULAR==================  Stage D Nonischemic Cardiomyopathy, Status Post HM2 on 2017; LVAD settings 9200 with flow of 5.9   TTE : reveals at least moderate MR, mild AI, severe global LV syst dysfxn, dec RV fxn.  Continue invasive hemodynamic monitoring   Rate control with amiodarone     aMIOdarone    Tablet 200 milliGRAM(s) Oral daily    ===================HEMATOLOGIC/ONC ===================  Anemia due to acute blood loss associated with upper GI bleed   Monitor H&H/Plts closely - no AC or AP as heart failure     VTE prophylaxis, heparin   Injectable 5000 Unit(s) SubCutaneous every 8 hours    ===================== RENAL =========================  Continue monitoring urine output, I&OS, BUN/Cr   Replete lytes PRN. Keep K> 4 and Mg >2     ==================== GASTROINTESTINAL===================  2 episodes of vomiting this AM, plan to hold tube feeds and consult GI to follow up   GI bleed in setting of recent melena, GI following -  Protonix IVP BID, c/w octreotide   As per GI, not stable for endoscopic evaluation, transfuse prn and f/u repeat CBC. May consider enteroscopy when stabilized.   CT A/P on : small ascites; ABD US showing GB wall thickening w/ surrounding pericholecystic fluid  S/p cholecystostomy tube placed on : with IR with -60cc of black bile, will monitor site closely.   C.diff + on , continue Vanco   Bowel regimen with Miralax and Senna   Simethicone PRN dyspepsia   Reglan for gut motility   Zofran PRN for nausea     dextrose 5% + sodium chloride 0.45%. 1000 milliLiter(s) (75 mL/Hr) IV Continuous <Continuous>  pantoprazole  Injectable 40 milliGRAM(s) IV Push every 12 hours  polyethylene glycol 3350 17 Gram(s) Oral daily  senna 2 Tablet(s) Oral at bedtime  simethicone 80 milliGRAM(s) Chew every 8 hours PRN Dyspepsia  sodium chloride 0.9%. 1000 milliLiter(s) (5 mL/Hr) IV Continuous <Continuous>  octreotide  Injectable 50 MICROGram(s) IV Push every 8 hours  metoclopramide Injectable 10 milliGRAM(s) IV Push every 8 hours  ondansetron Injectable 4 milliGRAM(s) IV Push every 6 hours PRN Nausea and/or Vomiting    =======================    ENDOCRINE  =====================  Stress hyperglycemia, continue glucose control with admelog sliding scale     insulin lispro (ADMELOG) corrective regimen sliding scale   SubCutaneous every 4 hours    ========================INFECTIOUS DISEASE================  Afebrile, WBC downtrending 10.92->9.81   Continue trending WBC and monitoring fever curve   SCX on  serratia and stenotro, SCx on  stenotro   BCx+  +Serratia marcescens c/w cefepime and metronidazole   Per ID, will consider adding bactrim if clinical condition worsens.   C.diff + on , continue vanco     cefepime   IVPB 1000 milliGRAM(s) IV Intermittent every 8 hours  metroNIDAZOLE  IVPB 500 milliGRAM(s) IV Intermittent every 8 hours  vancomycin    Solution 125 milliGRAM(s) Oral every 12 hours    I have spent 40 minutes of noncontinuous critical care time with this patient.    Patient requires continuous monitoring with bedside rhythm monitoring, pulse ox monitoring, and intermittent blood gas analysis. Care plan discussed with ICU care team. Patient remained critical and at risk for life threatening decompensation.     By signing my name below, I, Agnieszka Cherry, attest that this documentation has been prepared under the direction and in the presence of CLAUDIA Mejia   Electronically signed: Romel Shaffer, 21 @ 11:04    I, Go Sellers, personally performed the services described in this documentation. all medical record entries made by the romel were at my direction and in my presence. I have reviewed the chart and agree that the record reflects my personal performance and is accurate and complete  Electronically signed: CLAUDIA Mejia

## 2021-08-04 NOTE — PROGRESS NOTE ADULT - SUBJECTIVE AND OBJECTIVE BOX
Kiana Wheat MD  Cardiology Fellow  [phone]  All Cardiology service information can be found 24/7 on [email or web address], password: [email or web address]    Patient seen and examined at bedside.    Overnight Events: No events, patient is still having some abdominal pain, no BM yet    Review Of Systems: No chest pain, shortness of breath, or palpitations            Current Meds:  acetaminophen    Suspension .. 650 milliGRAM(s) Oral every 6 hours PRN  aMIOdarone    Tablet 200 milliGRAM(s) Oral daily  cefepime   IVPB 1000 milliGRAM(s) IV Intermittent every 8 hours  chlorhexidine 0.12% Liquid 15 milliLiter(s) Oral Mucosa every 12 hours  chlorhexidine 2% Cloths 1 Application(s) Topical <User Schedule>  clonazePAM  Tablet 0.5 milliGRAM(s) Oral at bedtime  dexMEDEtomidine Infusion 0.5 MICROgram(s)/kG/Hr IV Continuous <Continuous>  dextrose 5% + sodium chloride 0.45%. 1000 milliLiter(s) IV Continuous <Continuous>  heparin   Injectable 5000 Unit(s) SubCutaneous every 8 hours  hydrALAZINE 10 milliGRAM(s) Oral every 8 hours  insulin lispro (ADMELOG) corrective regimen sliding scale   SubCutaneous every 4 hours  metoclopramide Injectable 10 milliGRAM(s) IV Push every 8 hours  metroNIDAZOLE  IVPB 500 milliGRAM(s) IV Intermittent every 8 hours  octreotide  Injectable 50 MICROGram(s) IV Push every 8 hours  ondansetron Injectable 4 milliGRAM(s) IV Push every 6 hours PRN  pantoprazole  Injectable 40 milliGRAM(s) IV Push every 12 hours  polyethylene glycol 3350 17 Gram(s) Oral daily  senna 2 Tablet(s) Oral at bedtime  simethicone 80 milliGRAM(s) Chew every 8 hours PRN  sodium chloride 0.9%. 1000 milliLiter(s) IV Continuous <Continuous>  vancomycin    Solution 125 milliGRAM(s) Oral every 12 hours      Vitals:  T(F): 98 (08-04), Max: 98.8 (08-04)  HR: 92 (08-04) (76 - 127)  BP: --  RR: 23 (08-04)  SpO2: 100% (08-04)  I&O's Summary    03 Aug 2021 07:01  -  04 Aug 2021 07:00  --------------------------------------------------------  IN: 1885 mL / OUT: 2325 mL / NET: -440 mL    04 Aug 2021 07:01  -  04 Aug 2021 12:43  --------------------------------------------------------  IN: 480 mL / OUT: 400 mL / NET: 80 mL        Physical Exam:  Appearance: No acute distress; well appearing  Eyes: PERRL, EOMI, pink conjunctiva  HEENT: Normal oral mucosa  Cardiovascular: RRR, S1, S2, no murmurs, rubs, or gallops; no edema; no JVD  Respiratory: Clear to auscultation bilaterally  Gastrointestinal: soft, non-tender, non-distended with normal bowel sounds  Musculoskeletal: No clubbing; no joint deformity   Neurologic: Non-focal  Lymphatic: No lymphadenopathy                        8.1    9.81  )-----------( 210      ( 04 Aug 2021 00:10 )             26.3     08-04    132<L>  |  98  |  9   ----------------------------<  123<H>  4.0   |  24  |  0.56    Ca    9.0      04 Aug 2021 00:10  Phos  2.8     08-04  Mg     1.7     08-04    TPro  6.7  /  Alb  2.9<L>  /  TBili  0.6  /  DBili  x   /  AST  25  /  ALT  40  /  AlkPhos  165<H>  08-04

## 2021-08-04 NOTE — PROGRESS NOTE ADULT - PROBLEM SELECTOR PLAN 3
-serratia bacteremia and poss acalculous cholecystitis   on cefepmie/flagyl  - d/w transplant ID for duration  - PO vanc due to broad spectrum Abx - serratia bacteremia and poss acalculous cholecystitis  - on cefepmie/flagyl  - d/w transplant ID for duration  - PO vanc for C Diff ppx due to broad spectrum Abx

## 2021-08-04 NOTE — PROGRESS NOTE ADULT - ASSESSMENT
Mr. Ho Joint initially admitted for melena and anemia in the setting of supratherapeutic INR.  GI reconsulted for worsening anemia.    #Vomiting, abd pain - xray with slightly distended stomach, nonobstructive gas pattern. Likely component of ileus given critical illness. No s/s of toxic megacolon.  #Cdiff - on PO vanc  # Acute blood loss anemia and melena - hemodynamically unchanged. Likely represents recurrent GI bleed. Initial melena workup lead to capsule endoscopy on 6/14/2021 which revealed active bleeding in small bowel.  Single balloon 6/15/2021 revealed old blood in proximal ileum. 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  # LVAD on anticoagulation with coumadin  # Trach  # Tension pneumo s/p chest tube placement 7/26  # Septic shock - 2/2 serratia bacteremia 2/2 cholecystitis  # Acalculous cholecystitis - s/p perc dawn    Recommendations:  - ensure correction of all electrolytes  - frequent reposition in bed  - can trial reglan TID  - restart trickle feeds and titrate as needed  - if any abd distension, worsening abd pain, worsening vomiting or other decompensation would do CT a/p to r/o toxic megacolon      Susan Jacobo PGY-5  Gastroenterology Fellow  Pager #[phone] ([facility]) or [phone] ([place])  Available on [employer] Teams.  Please contact on-call GI fellow via answering service ([phone]) after 5pm and before 8am, and on weekends.

## 2021-08-04 NOTE — PROGRESS NOTE ADULT - ATTENDING COMMENTS
Clinically improving. Continue antibiotics for now. No antiplatelet or anticoagulation. Vent wean per CTU team. Needs physical therapy.

## 2021-08-04 NOTE — PROGRESS NOTE ADULT - ATTENDING COMMENTS
Agree with above  Complicated history including sepsis 2/2 cdiff and serratia bacteremia, chf with lvad, currently in hospital for 2 months stay  Had n/v starting over past 24 hours with diffuse abd  Improved with BM, reglan    Agree with reintroducing TF  c/w standing reglan  agree with treatment/prevention of any underlying ileus

## 2021-08-04 NOTE — PROGRESS NOTE ADULT - PROBLEM SELECTOR PLAN 5
-s/p trach 6/25, now back on vent in setting of sepsis and pneumothorax. Wean as tolerated.   -chest tube in place, management per CTU team -s/p trach 6/25, now back on vent in setting of sepsis and pneumothorax. Wean as tolerated.

## 2021-08-04 NOTE — PROGRESS NOTE ADULT - SUBJECTIVE AND OBJECTIVE BOX
RICKY JOINT  MRN#: [id number]  Subjective:  Pulmonary progress  : recurrent Acute hypoxic respiratory Failure ,aspiration pneumonia, NICM  , chart reviewed and H/O obtained radiological and Laboratory study reviewed patient Examined     65M PMH ACC/AHA stage D HF due to NICM HM2 LVAD , TV annuloplasty ring 17 as destination therapy due to severe peripheral artery disease with significant stenosis  SIADH, Depression, CKD-3 with hyperkalemia, past E. coli UTIs, driveline drainage (21) and COVID-19 (back in 2020)  He was recently seen in clinic where he complained of abdominal pain and dark stools w constipation back in May. He presents to [facility] ER today weakness and fatigue, moderate and + Black stools for three days, on coumadin secondary to warfarin use in the setting of an LVAD. Patient has required transfusions for GIB in the past. Mostly recently back in 2021 pt had anemia with dark stools. No interventions was done at that time. However Last Endoscopy was done in 2020 (negative). Today labs show patient is anemic with H/H of 4.5/16.3,. INR is 8.84 MAP in the 90s, Temp 35.1. He denies any chest pain, shortness of breath, dizziness, abd pain, nausea or vomiting. found to have  rectal bleeding underwent endoscopy ,old blood in the proximal ileum ,  develop sepsis with LL opacity given Antibiotics , Extubated , reintubated , Bronchoscopy on Zosyn for LL pneumonia  and Amiodarone S/P TV Annuloplasty , patient remain intubated on full ventilatory support .S/P multiple units of blood transfusion , remain on full ventilatory support on Precedex and propofol , new central IJ line , diarrhea C diff. +ve on po vancomycin and IV Flagyl,  mildly distended belly , fever start on cefepime 2gm q 8 hrs S/P tracheostomy .  new RT Subclavian central line continue on contact  isolation ,still diarrhea on C-diff antibiotics ENT follow up appreciated , trial of C-PAP as tolerated , , copious secretion from trach. site chest x ray left lower lobe pneumonia , tolerating trch. collar 50% FI02 still excessive secretion need pulmonary toilet , off Ancef antibiotic , no more diarrhea back on full support mechanical ventilator , chest x ray show improvement in LLL air space disease, more awake and responsive on tube feeding no more diarrhea , S/P  Ancef for bacteremia no fever ,ID follow up noted ,  no nausea or vomiting or diarrhea still very weak and tired , event note pulled NG tube now replaced , back on tube feeding ,still on po vancomycin , getting PT and OT at bed side , no plan for decannulation for now. , no more diarrhea receiving PT and OPT at bed side , minimal secretion from tracheostomy site , no SOB getting stronger , improve muscle tone patient transfer to monitor bed still on contact isolation for C-Difficel colitis on 50% FI02, NG tube clogged , and change to resume tube feeding still loose stool . H/H drop significantly require blood transfusion , most likely GI bleeding , IV heparin D/C , vomiting 200 cc of creamy color tube feeding on hold no sob, still melena monitor in the CTU possible capsule endoscopy , H/H is stable ., patient develop TR sided  pneumothorax require chest tube placement , RT IJ central line  placed , develop fever shaking chills , blood culture positive for serratia marcescens , start on cefepime .the patient  become hypoxic and hypotensive placed on full ventilatory support and Vasopressin , levophed and Dobutamine ,S/P blood transfusion on meropenem and vancomycin , IR note appreciated   , on and  off pressors , occasional agitation on Precedex .S/P IR cholecystostomy tube drainage placement in the RT upper Quadrant , resume anticoagulation chest x ray noted C-PAP trail lasted only for 2 hrs , new RT SC line and D/C RT IJ line , RT pig tail cathter has been removed , tolerating C-PAP trial placed on trach. collar 50% FI02         (2021 16:57)    PAST MEDICAL & SURGICAL HISTORY:  CHF (congestive heart failure)    CAD (coronary artery disease)  Depression    Pleural effusion    History of 2019 novel coronavirus disease (COVID-19)  2020    Hemorrhoids    Bleeding hemorrhoids    Peripheral arterial disease    Claudication    BPH with urinary obstruction    ACC/AHA stage D systolic heart failure  Anticoagulation goal of INR 2.0 to 2.5    Falls    Clavicle fracture    CKD (chronic kidney disease), stage III    Iron deficiency anemia    H/O epistaxis    Vertigo    GI bleed    S/P TVR (tricuspid valve repair)    S/P ventricular assist device    S/P endoscopy    OBJECTIVE:  ICU Vital Signs Last 24 Hrs  T(C): 38.2 (2021 10:00), Max: 38.5 (2021 12:00)  T(F): 100.8 (2021 10:00), Max: 101.3 (2021 12:00)  HR: 65 (2021 10:00) (61 - 69)  BP: --  BP(mean): --  ABP: 105/67 (2021 10:00) (90/54 - 113/64)  ABP(mean): 77 (2021 10:00) (63 - 77)  RR: 20 (2021 10:00) (19 - 35)  SpO2: 99% (2021 10:00) (96% - 100%)       @ 07:  -   @ 07:00  --------------------------------------------------------  IN: 2693.9 mL / OUT: 1415 mL / NET: 1278.9 mL     @ 07:  -   @ 10:49  --------------------------------------------------------  IN: 420.8 mL / OUT: 115 mL / NET: 305.8 mL  PHYSICAL EXAM:Daily   Elderly male S/P tracheostomy   on trach-collar  50% FI02      Daily Weight in k.4 (2021 00:00)  HEENT:     + NCAT  + EOMI  - Conjuctival edema   - Icterus   - Thrush   - ETT  + NGT/OGT  Neck:         + FROM  RT SCl line JVD  - Nodes - Masses + Mid-line trachea + Tracheostomy  Chest:            normal A-P diameter    Lungs:          + CTA   + Rhonchi    - Rales    - Wheezing + Decreased  LT BS   - Dullness R L  Cardiac:       + S1 + S2    + RRR   - Irregular   - S3  - S4    - Murmurs   - Rub   - Hamman’s sign   Abdomen:    + BS  + Soft + Non-tender - Distended - Organomegaly - PEG .cholecystostomy tube in place  Extremities:   - Cyanosis U/L   - Clubbing  U/L  + LE/UE Edema   + Capillary refill    + Pulses   Neuro:        - Awake   -  Alert   - Confused   - Lethargic   + Sedated  + Generalized Weakness  Skin:        - Rashes    - Erythema   + Normal incisions   + IV sites intact          HOSPITAL MEDICATIONS: All medications reviewed and analyzed  MEDICATIONS  (STANDING):  amiodarone    Tablet 200 milliGRAM(s) Oral daily  chlorhexidine 0.12% Liquid 15 milliLiter(s) Oral Mucosa every 12 hours  chlorhexidine 2% Cloths 1 Application(s) Topical <User Schedule>  dexmedetomidine Infusion 0.5 MICROgram(s)/kG/Hr (9.81 mL/Hr) IV Continuous <Continuous>  dextrose 50% Injectable 50 milliLiter(s) IV Push every 15 minutes  heparin  Infusion 400 Unit(s)/Hr (12.5 mL/Hr) IV Continuous <Continuous>  Hydromorphone  Injectable 0.5 milliGRAM(s) IV Push once  insulin lispro (ADMELOG) corrective regimen sliding scale   SubCutaneous every 6 hours  pantoprazole  Injectable 40 milliGRAM(s) IV Push every 12 hours  piperacillin/tazobactam IVPB.. 3.375 Gram(s) IV Intermittent every 8 hours  propofol Infusion 20 MICROgram(s)/kG/Min (9.42 mL/Hr) IV Continuous <Continuous>  sodium chloride 0.9% lock flush 3 milliLiter(s) IV Push every 8 hours  sodium chloride 0.9%. 1000 milliLiter(s) (10 mL/Hr) IV Continuous <Continuous>    MEDICATIONS  (PRN):  acetaminophen    Suspension .. 650 milliGRAM(s) Oral every 6 hours PRN Temp greater or equal to 38C (100.4F)    LABS: All Lab data reviewed and analyzed                        8.1    9.81  )-----------( 210      ( 04 Aug 2021 00:10 )             26.3   08-04    132<L>  |  98  |  9   ----------------------------<  123<H>  4.0   |  24  |  0.56    Ca    9.0      04 Aug 2021 00:10  Phos  2.8     08-04  Mg     1.7     08-04    TPro  6.7  /  Alb  2.9<L>  /  TBili  0.6  /  DBili  x   /  AST  25  /  ALT  40  /  AlkPhos  165<H>      Ca    8.8      03 Aug 2021 01:01  Phos  2.6     08-  Mg     1.8         TPro  6.5  /  Alb  3.0<L>  /  TBili  0.5  /  DBili  x   /  AST  17  /  ALT  47<H>  /  AlkPhos  171<H>      Ca    8.7      02 Aug 2021 00:41  Phos  2.8       Mg     1.7         TPro  6.2  /  Alb  2.6<L>  /  TBili  0.5  /  DBili  x   /  AST  15  /  ALT  60<H>  /  AlkPhos  169<H>      Ca    8.8      01 Aug 2021 00:32  Phos  2.9       Mg     1.8         TPro  6.6  /  Alb  3.0<L>  /  TBili  0.7  /  DBili  x   /  AST  21  /  ALT  93<H>  /  AlkPhos  181<H>      Ca    8.6      2021 00:25  Phos  3.2     -  Mg     2.0         TPro  6.2  /  Alb  2.7<L>  /  TBili  1.2  /  DBili  x   /  AST  43<H>  /  ALT  131<H>  /  AlkPhos  194<H>      Ca    9.1      2021 00:58  Phos  1.5       Mg     1.9         TPro  6.4  /  Alb  3.0<L>  /  TBili  1.3<H>  /  DBili  x   /  AST  117<H>  /  ALT  210<H>  /  AlkPhos  224<H>                                                                               PTT - ( 2021 04:52 )  PTT:45.2 sec LIVER FUNCTIONS - ( 2021 00:42 )  Alb: 3.4 g/dL / Pro: 6.7 g/dL / ALK PHOS: 213 U/L / ALT: 15 U/L / AST: 24 U/L / GGT: x           RADIOLOGY: - Reviewed and analyzed RT Pig tail cathter  , LVAD HM2, CT scan of abdomen reviewed result noted

## 2021-08-04 NOTE — PROGRESS NOTE ADULT - PROBLEM SELECTOR PLAN 2
- Current speed 9200 RPM  -no alarms  - HOLD coumadin given recurrent bleed. Will hold indefinitely.   - Will plan to hold aspirin indefinitely given recurrent GIB.  - Continue to monitor LDH (221 today) daily as he is off anticoagulation  - appreciate palliative care consult - Current speed 9200 RPM  - no alarms  - HOLD coumadin given recurrent bleed. Will hold indefinitely.   - Will plan to hold aspirin indefinitely given recurrent GIB.  - Continue to monitor LDH (221 today) daily as he is off anticoagulation  - appreciate palliative care consult

## 2021-08-04 NOTE — PROGRESS NOTE ADULT - ASSESSMENT
66 years old male with a history of stage D NICM s/p HM2 on 9/2017 as DT (due to severe PAD) with TV ring, prior COVID-19 infection 4/2020, recurrent syncope post LVAD s/p ILR, admitted on 6/14/21with symptomatic anemia with Hgb 4.5 in setting of INR 8.8 without hemodynamic instability. He was transfused and underwent VCE which showed active bleeding in the mid small bowel but subsequent enteroscopy 6/15 did not reveal any active bleeding. He acutely decompensated after procedure with fever/hypertension, low flow alarms, and pulmonary infiltrate with hypoxia requiring intubation from probable aspiration PNA. Course notable for inability to wean ventilator with persistent secretions for which he underwent tracheostomy. He developed C. diff colitis and is on vancomycin taper. He is currently vent dependent now and requiring pressor support. BCx showed serratia on 7/26, which correlates to his sputum culture on 7/7 and 7/27. The source is most likely from pneumonia or cholecystitis. Transaminitis could be secondary to acalculous cholecystitis. Stenotrophomonas is likely colonization and does not need to be treated unless patient decompensated on current treatment or having increased ventilation requirement.      IMPRESSION:  Septic shock secondary to high grade Serratia bacteremia, secondary to cholecystitis, resolved  Tension pneumothorax s/p right chest tube 7/26  Transaminitis, cholestatic pattern, possibly acalculous cholecystitis s/p perc dawn tube 7/30  C. diff colitis on PO vancomycin  ICM s/p LVAD  BCx serratia 7/26, NGTD since 7/27  Sputum culture serratia 7/7, serratia and stenotrophomonas 7/27, stenotrophomonas 8/1  Biliary Cx negative (collected after abx started)  CT consolidation on left lower lobe, improved from CT in June 2021  No cholangitis  No signs of drive line infection      RECOMMENDATIONS:  - Continue with IV cefepime 1gm q8hrs and IV Flagyl 500mg q8hrs for a total of 14 days (ends on 8/9) for GNR bacteremia  - Hold off on treating Stenotrophomonas for now unless he deteriorates on current treatment or with signs of worsening PNA  - Continue PO vancomycin 125mg q12hrs while on board-spectrum antibiotics  - Will continue to follow  - Guarded prognosis      * THIS IS AN INCOMPLETE NOTE. FINAL RECOMMENDATION WILL BE UPDATED AFTER DISCUSSING WITH ATTENDING *        Marisa Hall DO  PGY4 ID fellow  Pager: [phone]  [facility] pager ID: [phone]  Please contact me via page or text through [employer] Teams  If after 5PM or on weekends, please call [phone]     66 years old male with a history of stage D NICM s/p HM2 on 9/2017 as DT (due to severe PAD) with TV ring, prior COVID-19 infection 4/2020, recurrent syncope post LVAD s/p ILR, admitted on 6/14/21with symptomatic anemia with Hgb 4.5 in setting of INR 8.8 without hemodynamic instability. He was transfused and underwent VCE which showed active bleeding in the mid small bowel but subsequent enteroscopy 6/15 did not reveal any active bleeding. He acutely decompensated after procedure with fever/hypertension, low flow alarms, and pulmonary infiltrate with hypoxia requiring intubation from probable aspiration PNA. Course notable for inability to wean ventilator with persistent secretions for which he underwent tracheostomy. He developed C. diff colitis and is on vancomycin taper. He is currently vent dependent now and requiring pressor support. BCx showed serratia on 7/26, which correlates to his sputum culture on 7/7 and 7/27. The source is most likely from pneumonia or cholecystitis. Transaminitis could be secondary to acalculous cholecystitis. Stenotrophomonas is likely colonization and does not need to be treated unless patient decompensated on current treatment or having increased ventilation requirement.      IMPRESSION:  Septic shock secondary to high grade Serratia bacteremia, secondary to cholecystitis, resolved  Tension pneumothorax s/p right chest tube 7/26  Transaminitis, cholestatic pattern, possibly acalculous cholecystitis s/p perc dawn tube 7/30  C. diff colitis on PO vancomycin  ICM s/p LVAD  BCx serratia 7/26, NGTD since 7/27  Sputum culture serratia 7/7, serratia and stenotrophomonas 7/27, stenotrophomonas 8/1  Biliary Cx negative (collected after abx started)  CT consolidation on left lower lobe, improved from CT in June 2021  No cholangitis  No signs of drive line infection      RECOMMENDATIONS:  - Continue with IV cefepime 1gm q8hrs and IV Flagyl 500mg q8hrs for a total of 10 days (ends on 8/5) for GNR bacteremia  - Hold off on treating Stenotrophomonas for now unless he deteriorates on current treatment or with signs of worsening PNA  - Continue PO vancomycin 125mg q12hrs while on board-spectrum antibiotics and stop 48 hours after abx course  - Will continue to follow  - Guarded prognosis      Case discussed with attending        Marisa Hall DO  PGY4 ID fellow  Pager: [phone]  [facility] pager ID: [phone]  Please contact me via page or text through [employer] Teams  If after 5PM or on weekends, please call [phone]     66 years old male with a history of stage D NICM s/p HM2 on 9/2017 as DT (due to severe PAD) with TV ring, prior COVID-19 infection 4/2020, recurrent syncope post LVAD s/p ILR, admitted on 6/14/21with symptomatic anemia with Hgb 4.5 in setting of INR 8.8 without hemodynamic instability. He was transfused and underwent VCE which showed active bleeding in the mid small bowel but subsequent enteroscopy 6/15 did not reveal any active bleeding. He acutely decompensated after procedure with fever/hypertension, low flow alarms, and pulmonary infiltrate with hypoxia requiring intubation from probable aspiration PNA. Course notable for inability to wean ventilator with persistent secretions for which he underwent tracheostomy. He developed C. diff colitis and is on vancomycin taper. He is currently vent dependent now and requiring pressor support. BCx showed serratia on 7/26, which correlates to his sputum culture on 7/7 and 7/27. The source is most likely from pneumonia or cholecystitis. Transaminitis could be secondary to acalculous cholecystitis. Stenotrophomonas is likely colonization and does not need to be treated unless patient decompensated on current treatment or having increased ventilation requirement.      IMPRESSION:  Resolved septic shock secondary to high grade Serratia bacteremia, secondary to cholecystitis, resolved  Tension pneumothorax s/p right chest tube 7/26  Transaminitis, cholestatic pattern, possibly acalculous cholecystitis s/p perc dawn tube 7/30  C. diff colitis on PO vancomycin  ICM s/p LVAD  BCx serratia 7/26, NGTD since 7/27  Sputum culture serratia 7/7, serratia and stenotrophomonas 7/27, stenotrophomonas 8/1  Biliary Cx negative (collected after abx started)  CT consolidation on left lower lobe, improved from CT in June 2021  No cholangitis  No signs of drive line infection      RECOMMENDATIONS:  - Continue with IV cefepime 1gm q8hrs and IV Flagyl 500mg q8hrs for a total of 10 days (ends on 8/5) for GNR bacteremia  - Hold off on treating Stenotrophomonas for now unless he deteriorates on current treatment or with signs of worsening PNA  - Continue PO vancomycin 125mg q12hrs while on board-spectrum antibiotics and stop 48 hours after abx course  - Will continue to follow  - Guarded prognosis      Case discussed with attending        Marisa Hall DO  PGY4 ID fellow  Pager: [phone]  [facility] pager ID: [phone]  Please contact me via page or text through [employer] Teams  If after 5PM or on weekends, please call [phone]

## 2021-08-04 NOTE — PROGRESS NOTE ADULT - SUBJECTIVE AND OBJECTIVE BOX
Chief Complaint:  Patient is a 66y old  Male who presents with a chief complaint of Anemia, Supratherapeutic INR, Dark Stools (04 Aug 2021 12:43)      Interval Events: intermittent episodes of vomiting  - patient also reports diffuse abd pain, unable to characterize and unable to state if worse in certain location - points to left side and [facility] Medications:  acetaminophen    Suspension .. 650 milliGRAM(s) Oral every 6 hours PRN  aMIOdarone    Tablet 200 milliGRAM(s) Oral daily  cefepime   IVPB 1000 milliGRAM(s) IV Intermittent every 8 hours  chlorhexidine 0.12% Liquid 15 milliLiter(s) Oral Mucosa every 12 hours  chlorhexidine 2% Cloths 1 Application(s) Topical <User Schedule>  clonazePAM  Tablet 0.5 milliGRAM(s) Oral at bedtime  heparin   Injectable 5000 Unit(s) SubCutaneous every 8 hours  insulin lispro (ADMELOG) corrective regimen sliding scale   SubCutaneous every 4 hours  metoclopramide Injectable 10 milliGRAM(s) IV Push every 8 hours  metroNIDAZOLE  IVPB 500 milliGRAM(s) IV Intermittent every 8 hours  octreotide  Injectable 50 MICROGram(s) IV Push every 8 hours  ondansetron Injectable 4 milliGRAM(s) IV Push every 6 hours PRN  pantoprazole  Injectable 40 milliGRAM(s) IV Push every 12 hours  polyethylene glycol 3350 17 Gram(s) Oral daily  senna 2 Tablet(s) Oral at bedtime  simethicone 80 milliGRAM(s) Chew every 8 hours PRN  sodium chloride 0.9%. 1000 milliLiter(s) IV Continuous <Continuous>  vancomycin    Solution 125 milliGRAM(s) Oral every 12 hours      PMHX/PSHX:  No pertinent past medical history    CHF (congestive heart failure)    CAD (coronary artery disease)    Depression    Pleural effusion    History of 2019 novel coronavirus disease (COVID-19)    Hemorrhoids    Bleeding hemorrhoids    Peripheral arterial disease    Claudication    BPH with urinary obstruction    ACC/AHA stage D systolic heart failure    Anticoagulation goal of INR 2.0 to 2.5    Falls    Clavicle fracture    CKD (chronic kidney disease), stage III    Iron deficiency anemia    H/O epistaxis    Vertigo    GI bleed    No significant past surgical history    S/P TVR (tricuspid valve repair)    S/P ventricular assist device    S/P endoscopy            ROS:     General:  No weight loss, fevers, chills, night sweats, fatigue   Eyes:  No vision changes  ENT:  No sore throat, pain, runny nose  CV:  No chest pain, palpitations, dizziness   Resp:  No SOB, cough, wheezing  GI:  See HPI  :  No burning with urination, hematuria  Muscle:  No pain, weakness  Neuro:  No weakness/tingling, memory problems  Psych:  No fatigue, insomnia, mood problems, depression  Heme:  No easy bruisability  Skin:  No rash, edema      PHYSICAL EXAM:     GENERAL:  chronically ill appearing, awake and alert  HEENT:  trach in place, NGT in place  CHEST:  Full & symmetric excursion, no increased effort w/ respirations  HEART:  Regular rhythm & rate  ABDOMEN:  Soft, non-distended, slight tenderness to palpation throughout, dawn tube in place  EXTREMITIES:  no LE  edema  SKIN:  No rash/erythema/ecchymoses/petechiae/wounds/jaundice  NEURO:  Alert, awake, nods answers to questions    Vital Signs:  Vital Signs Last 24 Hrs  T(C): 36.7 (04 Aug 2021 12:00), Max: 37.1 (03 Aug 2021 16:00)  T(F): 98 (04 Aug 2021 12:00), Max: 98.8 (04 Aug 2021 04:00)  HR: 86 (04 Aug 2021 13:00) (76 - 127)  BP: --  BP(mean): --  RR: 16 (04 Aug 2021 13:00) (16 - 50)  SpO2: 100% (04 Aug 2021 13:00) (99% - 100%)  Daily     Daily Weight in k.7 (04 Aug 2021 00:00)    LABS:                        8.1    9.81  )-----------( 210      ( 04 Aug 2021 00:10 )             26.3     08-04    132<L>  |  98  |  9   ----------------------------<  123<H>  4.0   |  24  |  0.56    Ca    9.0      04 Aug 2021 00:10  Phos  2.8     08-04  Mg     1.7     08-04    TPro  6.7  /  Alb  2.9<L>  /  TBili  0.6  /  DBili  x   /  AST  25  /  ALT  40  /  AlkPhos  165<H>  08-04    LIVER FUNCTIONS - ( 04 Aug 2021 00:10 )  Alb: 2.9 g/dL / Pro: 6.7 g/dL / ALK PHOS: 165 U/L / ALT: 40 U/L / AST: 25 U/L / GGT: x                   Imaging:             Chief Complaint:  Patient is a 66y old  Male who presents with a chief complaint of Anemia, Supratherapeutic INR, Dark Stools (04 Aug 2021 12:43)      Interval Events: intermittent episodes of vomiting  - patient also reports diffuse abd pain, unable to characterize and unable to state if worse in certain location - points to left side and RUQ  - passing gas, 1BM recorded today      Hospital Medications:  acetaminophen    Suspension .. 650 milliGRAM(s) Oral every 6 hours PRN  aMIOdarone    Tablet 200 milliGRAM(s) Oral daily  cefepime   IVPB 1000 milliGRAM(s) IV Intermittent every 8 hours  chlorhexidine 0.12% Liquid 15 milliLiter(s) Oral Mucosa every 12 hours  chlorhexidine 2% Cloths 1 Application(s) Topical <User Schedule>  clonazePAM  Tablet 0.5 milliGRAM(s) Oral at bedtime  heparin   Injectable 5000 Unit(s) SubCutaneous every 8 hours  insulin lispro (ADMELOG) corrective regimen sliding scale   SubCutaneous every 4 hours  metoclopramide Injectable 10 milliGRAM(s) IV Push every 8 hours  metroNIDAZOLE  IVPB 500 milliGRAM(s) IV Intermittent every 8 hours  octreotide  Injectable 50 MICROGram(s) IV Push every 8 hours  ondansetron Injectable 4 milliGRAM(s) IV Push every 6 hours PRN  pantoprazole  Injectable 40 milliGRAM(s) IV Push every 12 hours  polyethylene glycol 3350 17 Gram(s) Oral daily  senna 2 Tablet(s) Oral at bedtime  simethicone 80 milliGRAM(s) Chew every 8 hours PRN  sodium chloride 0.9%. 1000 milliLiter(s) IV Continuous <Continuous>  vancomycin    Solution 125 milliGRAM(s) Oral every 12 hours      PMHX/PSHX:  No pertinent past medical history    CHF (congestive heart failure)    CAD (coronary artery disease)    Depression    Pleural effusion    History of 2019 novel coronavirus disease (COVID-19)    Hemorrhoids    Bleeding hemorrhoids    Peripheral arterial disease    Claudication    BPH with urinary obstruction    ACC/AHA stage D systolic heart failure    Anticoagulation goal of INR 2.0 to 2.5    Falls    Clavicle fracture    CKD (chronic kidney disease), stage III    Iron deficiency anemia    H/O epistaxis    Vertigo    GI bleed    No significant past surgical history    S/P TVR (tricuspid valve repair)    S/P ventricular assist device    S/P endoscopy            ROS:     General:  No weight loss, fevers, chills, night sweats, fatigue   Eyes:  No vision changes  ENT:  No sore throat, pain, runny nose  CV:  No chest pain, palpitations, dizziness   Resp:  No SOB, cough, wheezing  GI:  See HPI  :  No burning with urination, hematuria  Muscle:  No pain, weakness  Neuro:  No weakness/tingling, memory problems  Psych:  No fatigue, insomnia, mood problems, depression  Heme:  No easy bruisability  Skin:  No rash, edema      PHYSICAL EXAM:     GENERAL:  chronically ill appearing, awake and alert  HEENT:  trach in place, NGT in place  CHEST:  Full & symmetric excursion, no increased effort w/ respirations  HEART:  Regular rhythm & rate  ABDOMEN:  Soft, non-distended, slight tenderness to palpation throughout, dawn tube in place  EXTREMITIES:  no LE  edema  SKIN:  No rash/erythema/ecchymoses/petechiae/wounds/jaundice  NEURO:  Alert, awake, nods answers to questions    Vital Signs:  Vital Signs Last 24 Hrs  T(C): 36.7 (04 Aug 2021 12:00), Max: 37.1 (03 Aug 2021 16:00)  T(F): 98 (04 Aug 2021 12:00), Max: 98.8 (04 Aug 2021 04:00)  HR: 86 (04 Aug 2021 13:00) (76 - 127)  BP: --  BP(mean): --  RR: 16 (04 Aug 2021 13:00) (16 - 50)  SpO2: 100% (04 Aug 2021 13:00) (99% - 100%)  Daily     Daily Weight in k.7 (04 Aug 2021 00:00)    LABS:                        8.1    9.81  )-----------( 210      ( 04 Aug 2021 00:10 )             26.3     08-04    132<L>  |  98  |  9   ----------------------------<  123<H>  4.0   |  24  |  0.56    Ca    9.0      04 Aug 2021 00:10  Phos  2.8     08-04  Mg     1.7     08-04    TPro  6.7  /  Alb  2.9<L>  /  TBili  0.6  /  DBili  x   /  AST  25  /  ALT  40  /  AlkPhos  165<H>  08-04    LIVER FUNCTIONS - ( 04 Aug 2021 00:10 )  Alb: 2.9 g/dL / Pro: 6.7 g/dL / ALK PHOS: 165 U/L / ALT: 40 U/L / AST: 25 U/L / GGT: x                   Imaging:    < from: Xray Abdomen 1 View PORTABLE -Urgent (Xray Abdomen 1 View PORTABLE -Urgent .) (21 @ 09:20) >    IMPRESSION:    Nonspecific gas pattern. No radiographic evidence for obstruction. No masses or abnormal calcification. NG tube is in the stomach. The stomach is slightly distended. A left ventricular assisted device is evident.    < end of copied text >

## 2021-08-04 NOTE — PROGRESS NOTE ADULT - ASSESSMENT
Assessment and Recommendation:   · Assessment	  Assessment and recommendation :  Recurrent Acute hypoxic respiratory Failure S/P tracheostomy on trach-collar   at 50% FI02  Acute blood loss anemia S/P multiple  blood transfusion   septic shock off vasopressin , levophed and off  Dobutamine   sepsis with serratia marcescens in the blood on cefepime  S/P cholecystostomy tube placement by IR    RT pneumothorax S/P  Pig tail cathter placement and D/C   S/P Acute left lower lobe pneumonia   Aspiration pneumonia   Stool is  +ve for C-diff. colitis on PO vancomycin  improved  Non ischemic cardiomyopathy continue ACE inhibitor and B-Blockers   S/P Septic shock and cardiogenic shock   Stage D systolic heart failure S/P LVAD HM2   MH2 LVAD  with  TV Annuloplasty  Severe peripheral vascular disease   severe hyperglycemia on insulin coverage    cardiac arrythmia  on  Amiodarone   critical care polyneuropathy   Anemia of Acute blood Loss   severe protein caloric malnutrition   S/P Small bowel bleeding   S/P blood and FFP transfusion   Chronic kidney disease stage III   NGT feeding on Jevity   PT and OT at bed side   GI prophylaxis with PPI

## 2021-08-05 NOTE — PROGRESS NOTE ADULT - SUBJECTIVE AND OBJECTIVE BOX
Kiana Wheat MD  Cardiology Fellow  [phone]  All Cardiology service information can be found 24/7 on [email or web address], password: [email or web address]    Patient seen and examined at bedside.    Overnight Events:     Review Of Systems: No chest pain, shortness of breath, or palpitations            Current Meds:  acetaminophen    Suspension .. 650 milliGRAM(s) Oral every 6 hours PRN  aMIOdarone    Tablet 200 milliGRAM(s) Oral daily  chlorhexidine 0.12% Liquid 15 milliLiter(s) Oral Mucosa every 12 hours  chlorhexidine 2% Cloths 1 Application(s) Topical <User Schedule>  clonazePAM  Tablet 0.5 milliGRAM(s) Oral at bedtime  heparin   Injectable 5000 Unit(s) SubCutaneous every 8 hours  insulin lispro (ADMELOG) corrective regimen sliding scale   SubCutaneous every 4 hours  metoclopramide Injectable 10 milliGRAM(s) IV Push every 8 hours  metroNIDAZOLE  IVPB 500 milliGRAM(s) IV Intermittent every 8 hours  octreotide  Injectable 50 MICROGram(s) IV Push every 8 hours  ondansetron Injectable 4 milliGRAM(s) IV Push every 6 hours PRN  pantoprazole  Injectable 40 milliGRAM(s) IV Push every 12 hours  polyethylene glycol 3350 17 Gram(s) Oral daily  senna 2 Tablet(s) Oral at bedtime  sodium chloride 0.9%. 1000 milliLiter(s) IV Continuous <Continuous>  vancomycin    Solution 125 milliGRAM(s) Oral every 12 hours      Vitals:  T(F): 99.1 (08-05), Max: 99.1 (08-05)  HR: 101 (08-05) (84 - 108)  BP: --  RR: 23 (08-05)  SpO2: 100% (08-05)  I&O's Summary    04 Aug 2021 07:01  -  05 Aug 2021 07:00  --------------------------------------------------------  IN: 1760 mL / OUT: 1710 mL / NET: 50 mL        Physical Exam:  Appearance: No acute distress; well appearing  Eyes: PERRL, EOMI, pink conjunctiva  HEENT: Normal oral mucosa  Cardiovascular: RRR, S1, S2, no murmurs, rubs, or gallops; no edema; no JVD  Respiratory: Clear to auscultation bilaterally  Gastrointestinal: soft, non-tender, non-distended with normal bowel sounds  Musculoskeletal: No clubbing; no joint deformity   Neurologic: Non-focal  Lymphatic: No lymphadenopathy  Psychiatry: AAOx3, mood & affect appropriate  Skin: No rashes, ecchymoses, or cyanosis                          8.1    10.66 )-----------( 228      ( 05 Aug 2021 00:53 )             27.0     08-05    134<L>  |  99  |  5<L>  ----------------------------<  116<H>  3.8   |  24  |  0.55    Ca    8.8      05 Aug 2021 00:53  Phos  2.9     08-05  Mg     1.8     08-05    TPro  6.6  /  Alb  2.7<L>  /  TBili  0.5  /  DBili  x   /  AST  25  /  ALT  30  /  AlkPhos  151<H>  08-05                  New ECG(s): Personally reviewed    Echo:    Stress Testing:     Cath:    Imaging:    Interpretation of Telemetry:   Kiana Wheat MD  Cardiology Fellow  [phone]  All Cardiology service information can be found 24/7 on [email or web address], password: [email or web address]    Patient seen and examined at bedside.    Overnight Events: Patient still with abdominal pain.     Review Of Systems: No chest pain, shortness of breath, or palpitations            Current Meds:  acetaminophen    Suspension .. 650 milliGRAM(s) Oral every 6 hours PRN  aMIOdarone    Tablet 200 milliGRAM(s) Oral daily  chlorhexidine 0.12% Liquid 15 milliLiter(s) Oral Mucosa every 12 hours  chlorhexidine 2% Cloths 1 Application(s) Topical <User Schedule>  clonazePAM  Tablet 0.5 milliGRAM(s) Oral at bedtime  heparin   Injectable 5000 Unit(s) SubCutaneous every 8 hours  insulin lispro (ADMELOG) corrective regimen sliding scale   SubCutaneous every 4 hours  metoclopramide Injectable 10 milliGRAM(s) IV Push every 8 hours  metroNIDAZOLE  IVPB 500 milliGRAM(s) IV Intermittent every 8 hours  octreotide  Injectable 50 MICROGram(s) IV Push every 8 hours  ondansetron Injectable 4 milliGRAM(s) IV Push every 6 hours PRN  pantoprazole  Injectable 40 milliGRAM(s) IV Push every 12 hours  polyethylene glycol 3350 17 Gram(s) Oral daily  senna 2 Tablet(s) Oral at bedtime  sodium chloride 0.9%. 1000 milliLiter(s) IV Continuous <Continuous>  vancomycin    Solution 125 milliGRAM(s) Oral every 12 hours      Vitals:  T(F): 99.1 (08-05), Max: 99.1 (08-05)  HR: 101 (08-05) (84 - 108)  BP: --  RR: 23 (08-05)  SpO2: 100% (08-05)  I&O's Summary    04 Aug 2021 07:01  -  05 Aug 2021 07:00  --------------------------------------------------------  IN: 1760 mL / OUT: 1710 mL / NET: 50 mL        Physical Exam:  Gen: well appearing in NAD  HEENT: EOMI, MMM  Cardio: S1 S2 no murmurs, rubs or gallops  Chest: CTAB  Abd: soft nontender to palpation, bS+  Extremities: no edema                          8.1    10.66 )-----------( 228      ( 05 Aug 2021 00:53 )             27.0     08-05    134<L>  |  99  |  5<L>  ----------------------------<  116<H>  3.8   |  24  |  0.55    Ca    8.8      05 Aug 2021 00:53  Phos  2.9     08-05  Mg     1.8     08-05    TPro  6.6  /  Alb  2.7<L>  /  TBili  0.5  /  DBili  x   /  AST  25  /  ALT  30  /  AlkPhos  151<H>  08-05

## 2021-08-05 NOTE — PROGRESS NOTE ADULT - SUBJECTIVE AND OBJECTIVE BOX
RICKY HAMPTON  MRN-[id number]  Patient is a 66y old  Male who presents with a chief complaint of Anemia, Supratherapeutic INR, Dark Stools (04 Aug 2021 13:55)    HPI:  65M PMH ACC/AHA stage D HF due to NICM HM2 LVAD , TV annuloplasty ring 17 as destination therapy due to severe peripheral artery disease with significant stenosis  SIADH, Depression, CKD-3 with hyperkalemia, past E. coli UTIs, driveline drainage (21) and COVID-19 (back in 2020)  He was recently seen in clinic where he complained of abdominal pain and dark stools w constipation back in May. He presents to [facility] ER today weakness and fatigue, moderate and + Black stools for three days, on coumadin secondary to warfarin use in the setting of an LVAD. Patient has required transfusions for GIB in the past. Mostly recently back in 2021 pt had anemia with dark stools. No interventions was done at that time. However Last Endoscopy was done in 2020 (negative). Today labs show patient is anemic with H/H of 4.5/16.3,. INR is 8.84 MAP in the 90s, Temp 35.1. He denies any chest pain, shortness of breath, dizziness, abd pain, nausea or vomiting.       (2021 16:57)      Surgery/Hospital Course:   admit for melena w/ anemia, INR 8.84   6/15 Capsul study (+) for small bowel bleed, balloon endoscopy (old blood in prox ileum); post EGD - septic w/ L opacity, re-intubated for concern for aspiration, TTE (Mod MR, decrease biV w/ interventricular septum boweing towards R)   bronch    +C Diff    TC    CT C/A/P: Fluid filled colon which may be 2/2 rapid transit. Small bilateral pleffs with associates. Compressive atelectasis New ISABELLE & LLL  parenchymal opacities, suspicious for pneumonia. 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    #8 Shiley trach at bedside    CPAP trials    LVAD speed increased to 9200   Bronch; Central line dc'ed   TC since , continue as tolerated. Patient transferred to SDU.    Cont PT, no trach downsize today(#8cuffed)/ Anticoagulation/ Continue TF, speech f/u/ Vanco taper    oob to chair  INR  2.47  5 mg coumadin     increased lop to 25 q8  per HF   INR today 2.64.  H&H 7.3/24 this AM.  Will repeat CBC at noon, and will send stool guaiac Patient with persistent abdominal tenderness, rate of tube feeds decreased.  No nausea/vomiting.     INR today 2.4H&H 9.1/.6 low flow overnight /N&V  NPO resolved for capsule study  Coumadin on hold refusing Tube feeds on D5  normal  @50 cc/hr   INR 2.69  H&H 7..1 refusing Tube feeds on D5  normal  @50 cc/hr. This am + BM Anusha Oneill HF  aware- PRBC x1  GI team consulted -  NPO  plan on study in am-  D/w Dr Cadet Patient  to return to CTU for further management; 1PRBCS    Post op INR 2.2 today.  No bleeding. BC + for SM.  Pt is hypotensive requiring pressor and inotropic support.  ID follow up today on Cefepime will follow.   R PTC for PTX    CT C/A/P: sub q emphysema in R chest wall, GGO RUL, small ascites CTH negative; Abd US: GB thickening, pericholecystic fluid     Perchole drain in place continues to drain total output overnight 133.  Fever today 38.8 given tylenol.  Will repeat BC now.     duplex LE negative     Today:    REVIEW OF SYSTEMS:  Unable to obtain secondary to pt being trached    ICU Vital Signs Last 24 Hrs  T(C): 37.3 (05 Aug 2021 04:00), Max: 37.3 (05 Aug 2021 04:00)  T(F): 99.1 (05 Aug 2021 04:00), Max: 99.1 (05 Aug 2021 04:00)  HR: 101 (05 Aug 2021 07:00) (84 - 108)  BP: --  BP(mean): --  ABP: 94/67 (05 Aug 2021 07:00) (69/62 - 118/107)  ABP(mean): 77 (05 Aug 2021 07:00) (65 - 110)  RR: 23 (05 Aug 2021 07:00) (5 - 46)  SpO2: 100% (05 Aug 2021 07:00) (91% - 100%)      Physical Exam:  Gen:  awake and alert    CNS:  intact, nonfocal   Neck: no JVD, +TC   RES : clear , no wheezing              CVS: +LVAD hum   Abd: Soft, +LLQ tender to palpation   Skin: No rash  Ext:  no edema    ============================I/O===========================   I&O's Detail    04 Aug 2021 07:01  -  05 Aug 2021 07:00  --------------------------------------------------------  IN:    dextrose 5% + sodium chloride 0.45%: 450 mL    Enteral Tube Flush: 290 mL    IV PiggyBack: 250 mL    IV PiggyBack: 200 mL    Miscellaneous Tube Feedin mL    sodium chloride 0.9%: 120 mL  Total IN: 1760 mL    OUT:    Dexmedetomidine: 0 mL    Drain (mL): 275 mL    Voided (mL): 1435 mL  Total OUT: 1710 mL    Total NET: 50 mL        ============================ LABS =========================                        8.1    10.66 )-----------( 228      ( 05 Aug 2021 00:53 )             27.0     08-05    134<L>  |  99  |  5<L>  ----------------------------<  116<H>  3.8   |  24  |  0.55    Ca    8.8      05 Aug 2021 00:53  Phos  2.9     08-05  Mg     1.8     08-05    TPro  6.6  /  Alb  2.7<L>  /  TBili  0.5  /  DBili  x   /  AST  25  /  ALT  30  /  AlkPhos  151<H>      LIVER FUNCTIONS - ( 05 Aug 2021 00:53 )  Alb: 2.7 g/dL / Pro: 6.6 g/dL / ALK PHOS: 151 U/L / ALT: 30 U/L / AST: 25 U/L / GGT: x             ABG - ( 05 Aug 2021 00:50 )  pH, Arterial: 7.44  pH, Blood: x     /  pCO2: 42    /  pO2: 157   / HCO3: 28    / Base Excess: 3.5   /  SaO2: 100                 ======================Micro/Rad/Cardio=================  Culture: Reviewed   CXR: Reviewed  Echo:Reviewed  ======================================================  PAST MEDICAL & SURGICAL HISTORY:  CHF (congestive heart failure)    CAD (coronary artery disease)    Depression    Pleural effusion    History of 2019 novel coronavirus disease (COVID-19)  2020    Hemorrhoids    Bleeding hemorrhoids    Peripheral arterial disease    Claudication    BPH with urinary obstruction    ACC/AHA stage D systolic heart failure    Anticoagulation goal of INR 2.0 to 2.5    Falls    Clavicle fracture    CKD (chronic kidney disease), stage III    Iron deficiency anemia    H/O epistaxis    Vertigo    GI bleed    S/P TVR (tricuspid valve repair)    S/P ventricular assist device    S/P endoscopy      ====================ASSESSMENT ==============  67 YO M with a history of stage D NICM s/p HM2 on 2017 as DT (due to severe PAD) with TV ring, prior COVID-19 infection 2020, recurrent syncope post LVAD s/p ILR, and chronic abdominal pain with prior negative workup who was admitted with symptomatic anemia with Hgb 4.5 in setting of INR 8.8. Hospital course c/b cardiogenic shock, hemodynamic instability, acute hypoxemic respiratory failure s/p tracheostomy, GI bleed S/P Enteroscopy , Anemia, in setting of melena, chronic kidney disease, stress hyperglycemia, C.diff positive on , hypovolemic shock, septic shock.     Plan:  ====================== NEUROLOGY=====================  CTH on  with no acute intracranial hemorrhage or acute territorial infarction     Tylenol PRN for analgesia   acetaminophen    Suspension .. 650 milliGRAM(s) Oral every 6 hours PRN Mild Pain (1 - 3)    Sleep regimen with   clonazePAM  Tablet 0.5 milliGRAM(s) Oral at bedtime    ==================== RESPIRATORY======================  Acute hypoxemic respiratory failure s/p #8 shiley trach on ; CPAP/TC vent weaning as tolerated  Requiring intermittent full vent support, continue close monitoring of respiratory rate and breathing pattern, following of ABG’s with A-line monitoring, continuous pulse oximetry monitoring.   CT chest : Sub q emphysema in R chest wall, GGO RUL     Mechanical Ventilation:  Mode: off    ====================CARDIOVASCULAR==================  Stage D Nonischemic Cardiomyopathy, Status Post HM2 on 2017; LVAD settings 9200 with flow of 6.0  TTE : reveals at least moderate MR, mild AI, severe global LV syst dysfxn, dec RV fxn   Continue invasive hemodynamic monitoring     Rate control with   aMIOdarone    Tablet 200 milliGRAM(s) Oral daily    ===================HEMATOLOGIC/ONC ===================  Anemia due to acute blood loss associated with upper GI bleed   Monitor H&H/Plts closely - no AC/AP as heart failure     VTE prophylaxis   heparin   Injectable 5000 Unit(s) SubCutaneous every 8 hours    ===================== RENAL =========================  Continue monitoring urine output, I&OS, BUN/Cr   Replete lytes PRN. Keep K> 4 and Mg >2     ==================== GASTROINTESTINAL===================  CT A/P on : small ascites; ABD US showing GB wall thickening w/ surrounding pericholecystic fluid  S/p cholecystostomy tube placed on : with IR with -60cc of black bile, will monitor site closely.   As per GI can restart trickle feed and titrate as needed   C.diff + on , continue Vanco     2 episodes of emesis yesterday in setting of abdominal pain, as per GI likely component of illeus given critical illness   If worsening abdominal pain or vomiting, recommended CT A/P to r/o toxic megacolon - for now trail  metoclopramide Injectable 10 milliGRAM(s) IV Push every 8 hours    GI prophylaxis   pantoprazole  Injectable 40 milliGRAM(s) IV Push every 12 hours  octreotide  Injectable 50 MICROGram(s) IV Push every 8 hours    Bowel regimen   polyethylene glycol 3350 17 Gram(s) Oral daily  senna 2 Tablet(s) Oral at bedtime    PRN for nausea/vomiting   ondansetron Injectable 4 milliGRAM(s) IV Push every 6 hours PRN Nausea and/or Vomiting    sodium chloride 0.9%. 1000 milliLiter(s) (5 mL/Hr) IV Continuous <Continuous>    =======================    ENDOCRINE  =====================  Stress hyperglycemia, continue glucose control with   insulin lispro (ADMELOG) corrective regimen sliding scale   SubCutaneous every 4 hours    ========================INFECTIOUS DISEASE================  Afebrile, WBC rising 9.81->10.66   Continue trending WBC and monitoring fever curve   SCX on  serratia and stenotro, SCx on  stenotro   Per ID, will consider adding bactrim if clinical condition worsens.     BCx+  +Serratia marcescens, c/w   metroNIDAZOLE  IVPB 500 milliGRAM(s) IV Intermittent every 8 hours    C.diff + on , continue   vancomycin    Solution 125 milliGRAM(s) Oral every 12 hours        Patient requires continuous monitoring with bedside rhythm monitoring, pulse ox monitoring, and intermittent blood gas analysis. Care plan discussed with ICU care team. Patient remained critical and at risk for life threatening decompensation.     By signing my name below, I, Agnieszka Cherry, attest that this documentation has been prepared under the direction and in the presence of CLAUDIA Gale   Electronically signed: Cesia Shaffer, 21 @ 08:17    I, Aleksandra Olivas, personally performed the services described in this documentation. all medical record entries made by the scribe were at my direction and in my presence. I have reviewed the chart and agree that the record reflects my personal performance and is accurate and complete  Electronically signed: CLAUDIA Gale        RICKY HAMPTON  MRN-[id number]  Patient is a 66y old  Male who presents with a chief complaint of Anemia, Supratherapeutic INR, Dark Stools (04 Aug 2021 13:55)    HPI:  65M PMH ACC/AHA stage D HF due to NICM HM2 LVAD , TV annuloplasty ring 17 as destination therapy due to severe peripheral artery disease with significant stenosis  SIADH, Depression, CKD-3 with hyperkalemia, past E. coli UTIs, driveline drainage (21) and COVID-19 (back in 2020)  He was recently seen in clinic where he complained of abdominal pain and dark stools w constipation back in May. He presents to [facility] ER today weakness and fatigue, moderate and + Black stools for three days, on coumadin secondary to warfarin use in the setting of an LVAD. Patient has required transfusions for GIB in the past. Mostly recently back in 2021 pt had anemia with dark stools. No interventions was done at that time. However Last Endoscopy was done in 2020 (negative). Today labs show patient is anemic with H/H of 4.5/16.3,. INR is 8.84 MAP in the 90s, Temp 35.1. He denies any chest pain, shortness of breath, dizziness, abd pain, nausea or vomiting.       (2021 16:57)      Surgery/Hospital Course:   admit for melena w/ anemia, INR 8.84   6/15 Capsul study (+) for small bowel bleed, balloon endoscopy (old blood in prox ileum); post EGD - septic w/ L opacity, re-intubated for concern for aspiration, TTE (Mod MR, decrease biV w/ interventricular septum boweing towards R)   bronch    +C Diff    TC    CT C/A/P: Fluid filled colon which may be 2/2 rapid transit. Small bilateral pleffs with associates. Compressive atelectasis New ISABELLE & LLL  parenchymal opacities, suspicious for pneumonia. 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    #8 Shiley trach at bedside    CPAP trials    LVAD speed increased to 9200   Bronch; Central line dc'ed   TC since , continue as tolerated. Patient transferred to SDU.    Cont PT, no trach downsize today(#8cuffed)/ Anticoagulation/ Continue TF, speech f/u/ Vanco taper    oob to chair  INR  2.47  5 mg coumadin     increased lop to 25 q8  per HF   INR today 2.64.  H&H 7.3/24 this AM.  Will repeat CBC at noon, and will send stool guaiac Patient with persistent abdominal tenderness, rate of tube feeds decreased.  No nausea/vomiting.     INR today 2.4H&H 9.1.6 low flow overnight /N&V  NPO resolved for capsule study  Coumadin on hold refusing Tube feeds on D5  normal  @50 cc/hr   INR 2.69  H&H 7..1 refusing Tube feeds on D5  normal  @50 cc/hr. This am + BM Anusha Oneill HF  aware- PRBC x1  GI team consulted -  NPO  plan on study in am-  D/w Dr Cadet Patient  to return to CTU for further management; 1PRBCS    Post op INR 2.2 today.  No bleeding. BC + for SM.  Pt is hypotensive requiring pressor and inotropic support.  ID follow up today on Cefepime will follow.   R PTC for PTX    CT C/A/P: sub q emphysema in R chest wall, GGO RUL, small ascites CTH negative; Abd US: GB thickening, pericholecystic fluid     Perchole drain in place continues to drain total output overnight 133.  Fever today 38.8 given tylenol.  Will repeat BC now.     duplex LE negative     Today:  Continue TC as tolerated. OOBTC and ambulate. Continue monitoring daily LDH as pt is on no anticoagulation.     REVIEW OF SYSTEMS:  Unable to obtain secondary to pt being trached    ICU Vital Signs Last 24 Hrs  T(C): 37.3 (05 Aug 2021 04:00), Max: 37.3 (05 Aug 2021 04:00)  T(F): 99.1 (05 Aug 2021 04:00), Max: 99.1 (05 Aug 2021 04:00)  HR: 101 (05 Aug 2021 07:00) (84 - 108)  BP: --  BP(mean): --  ABP: 94/67 (05 Aug 2021 07:00) (69/62 - 118/107)  ABP(mean): 77 (05 Aug 2021 07:00) (65 - 110)  RR: 23 (05 Aug 2021 07:00) (5 - 46)  SpO2: 100% (05 Aug 2021 07:00) (91% - 100%)      Physical Exam:  Gen:  awake and alert    CNS:  intact, nonfocal   Neck: no JVD, +TC   RES : clear , no wheezing              CVS: +LVAD hum   Abd: Soft, +LLQ tender to palpation   Skin: No rash  Ext:  no edema    ============================I/O===========================   I&O's Detail    04 Aug 2021 07:01  -  05 Aug 2021 07:00  --------------------------------------------------------  IN:    dextrose 5% + sodium chloride 0.45%: 450 mL    Enteral Tube Flush: 290 mL    IV PiggyBack: 250 mL    IV PiggyBack: 200 mL    Miscellaneous Tube Feedin mL    sodium chloride 0.9%: 120 mL  Total IN: 1760 mL    OUT:    Dexmedetomidine: 0 mL    Drain (mL): 275 mL    Voided (mL): 1435 mL  Total OUT: 1710 mL    Total NET: 50 mL        ============================ LABS =========================                        8.1    10.66 )-----------( 228      ( 05 Aug 2021 00:53 )             27.0     08-05    134<L>  |  99  |  5<L>  ----------------------------<  116<H>  3.8   |  24  |  0.55    Ca    8.8      05 Aug 2021 00:53  Phos  2.9     08-  Mg     1.8     -    TPro  6.6  /  Alb  2.7<L>  /  TBili  0.5  /  DBili  x   /  AST  25  /  ALT  30  /  AlkPhos  151<H>      LIVER FUNCTIONS - ( 05 Aug 2021 00:53 )  Alb: 2.7 g/dL / Pro: 6.6 g/dL / ALK PHOS: 151 U/L / ALT: 30 U/L / AST: 25 U/L / GGT: x             ABG - ( 05 Aug 2021 00:50 )  pH, Arterial: 7.44  pH, Blood: x     /  pCO2: 42    /  pO2: 157   / HCO3: 28    / Base Excess: 3.5   /  SaO2: 100                 ======================Micro/Rad/Cardio=================  Culture: Reviewed   CXR: Reviewed  Echo:Reviewed  ======================================================  PAST MEDICAL & SURGICAL HISTORY:  CHF (congestive heart failure)    CAD (coronary artery disease)    Depression    Pleural effusion    History of 2019 novel coronavirus disease (COVID-19)  2020    Hemorrhoids    Bleeding hemorrhoids    Peripheral arterial disease    Claudication    BPH with urinary obstruction    ACC/AHA stage D systolic heart failure    Anticoagulation goal of INR 2.0 to 2.5    Falls    Clavicle fracture    CKD (chronic kidney disease), stage III    Iron deficiency anemia    H/O epistaxis    Vertigo    GI bleed    S/P TVR (tricuspid valve repair)    S/P ventricular assist device    S/P endoscopy      ====================ASSESSMENT ==============  67 YO M with a history of stage D NICM s/p HM2 on 2017 as DT (due to severe PAD) with TV ring, prior COVID-19 infection 2020, recurrent syncope post LVAD s/p ILR, and chronic abdominal pain with prior negative workup who was admitted with symptomatic anemia with Hgb 4.5 in setting of INR 8.8. Hospital course c/b cardiogenic shock, hemodynamic instability, acute hypoxemic respiratory failure s/p tracheostomy, GI bleed S/P Enteroscopy , Anemia, in setting of melena, chronic kidney disease, stress hyperglycemia, C.diff positive on , hypovolemic shock, septic shock.     Plan:  ====================== NEUROLOGY=====================  CTH on  with no acute intracranial hemorrhage or acute territorial infarction  OOBTC and ambulate as tolerated      Tylenol PRN for analgesia   acetaminophen    Suspension .. 650 milliGRAM(s) Oral every 6 hours PRN Mild Pain (1 - 3)    Sleep regimen with   clonazePAM  Tablet 0.5 milliGRAM(s) Oral at bedtime    ==================== RESPIRATORY======================  Acute hypoxemic respiratory failure s/p #8 shiley trach on ; TC vent weaning as tolerated  Requiring intermittent full vent support, continue close monitoring of respiratory rate and breathing pattern, following of ABG’s with A-line monitoring, continuous pulse oximetry monitoring.   CT chest : Sub q emphysema in R chest wall, GGO RUL     Mechanical Ventilation:  Mode: off    ====================CARDIOVASCULAR==================  Stage D Nonischemic Cardiomyopathy, Status Post HM2 on 2017; LVAD settings 9200 with flow of 6.0  TTE : reveals at least moderate MR, mild AI, severe global LV syst dysfxn, dec RV fxn   Continue invasive hemodynamic monitoring     Rate control with   aMIOdarone    Tablet 200 milliGRAM(s) Oral daily    ===================HEMATOLOGIC/ONC ===================  Anemia due to acute blood in setting of recent upper GI bleed   Continue monitoring daily LDH as pt is on no anticoagulation    Monitor H&H/Plts closely     VTE prophylaxis   heparin   Injectable 5000 Unit(s) SubCutaneous every 8 hours    ===================== RENAL =========================  Continue monitoring urine output, I&OS, BUN/Cr   Replete lytes PRN. Keep K> 4 and Mg >2     ==================== GASTROINTESTINAL===================  CT A/P on : small ascites; ABD US showing GB wall thickening w/ surrounding pericholecystic fluid  S/p cholecystostomy tube placed on : with IR with -60cc of black bile, will monitor site closely.   As per GI can restart trickle feed and titrate as needed   C.diff + on , continue Vanco     2 episodes of emesis yesterday in setting of abdominal pain, as per GI likely component of illeus given critical illness   If worsening abdominal pain or vomiting, recommended CT A/P to r/o toxic megacolon - for now trail  metoclopramide Injectable 10 milliGRAM(s) IV Push every 8 hours    GI prophylaxis   pantoprazole  Injectable 40 milliGRAM(s) IV Push every 12 hours  octreotide  Injectable 50 MICROGram(s) IV Push every 8 hours    Bowel regimen   polyethylene glycol 3350 17 Gram(s) Oral daily  senna 2 Tablet(s) Oral at bedtime    PRN for nausea/vomiting   ondansetron Injectable 4 milliGRAM(s) IV Push every 6 hours PRN Nausea and/or Vomiting    sodium chloride 0.9%. 1000 milliLiter(s) (5 mL/Hr) IV Continuous <Continuous>    =======================    ENDOCRINE  =====================  Stress hyperglycemia, continue glucose control with   insulin lispro (ADMELOG) corrective regimen sliding scale   SubCutaneous every 4 hours    ========================INFECTIOUS DISEASE================  Afebrile, WBC rising 9.81->10.66   Continue trending WBC and monitoring fever curve   SCX on  serratia and stenotro, SCx on  stenotro   Per ID, will consider adding bactrim if clinical condition worsens.     BCx+  +Serratia marcescens, c/w   metroNIDAZOLE  IVPB 500 milliGRAM(s) IV Intermittent every 8 hours    C.diff + on , continue   vancomycin    Solution 125 milliGRAM(s) Oral every 12 hours        Patient requires continuous monitoring with bedside rhythm monitoring, pulse ox monitoring, and intermittent blood gas analysis. Care plan discussed with ICU care team. Patient remained critical and at risk for life threatening decompensation.     By signing my name below, I, Agnieszka Cherry, attest that this documentation has been prepared under the direction and in the presence of CLAUDIA Gale   Electronically signed: Cesia Shaffer, 21 @ 08:17    I, Aleksandra Olivas, personally performed the services described in this documentation. all medical record entries made by the luisibe were at my direction and in my presence. I have reviewed the chart and agree that the record reflects my personal performance and is accurate and complete  Electronically signed: CLAUDIA Gale        RICKY HAMPTON  MRN-[id number]  Patient is a 66y old  Male who presents with a chief complaint of Anemia, Supratherapeutic INR, Dark Stools (04 Aug 2021 13:55)    HPI:  65M PMH ACC/AHA stage D HF due to NICM HM2 LVAD , TV annuloplasty ring 17 as destination therapy due to severe peripheral artery disease with significant stenosis  SIADH, Depression, CKD-3 with hyperkalemia, past E. coli UTIs, driveline drainage (21) and COVID-19 (back in 2020)  He was recently seen in clinic where he complained of abdominal pain and dark stools w constipation back in May. He presents to [facility] ER today weakness and fatigue, moderate and + Black stools for three days, on coumadin secondary to warfarin use in the setting of an LVAD. Patient has required transfusions for GIB in the past. Mostly recently back in 2021 pt had anemia with dark stools. No interventions was done at that time. However Last Endoscopy was done in 2020 (negative). Today labs show patient is anemic with H/H of 4.5/16.3,. INR is 8.84 MAP in the 90s, Temp 35.1. He denies any chest pain, shortness of breath, dizziness, abd pain, nausea or vomiting.       (2021 16:57)      Surgery/Hospital Course:   admit for melena w/ anemia, INR 8.84   6/15 Capsul study (+) for small bowel bleed, balloon endoscopy (old blood in prox ileum); post EGD - septic w/ L opacity, re-intubated for concern for aspiration, TTE (Mod MR, decrease biV w/ interventricular septum boweing towards R)   bronch    +C Diff    TC    CT C/A/P: Fluid filled colon which may be 2/2 rapid transit. Small bilateral pleffs with associates. Compressive atelectasis New ISABELLE & LLL  parenchymal opacities, suspicious for pneumonia. 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    #8 Shiley trach at bedside    CPAP trials    LVAD speed increased to 9200   Bronch; Central line dc'ed   TC since , continue as tolerated. Patient transferred to SDU.    Cont PT, no trach downsize today(#8cuffed)/ Anticoagulation/ Continue TF, speech f/u/ Vanco taper    oob to chair  INR  2.47  5 mg coumadin     increased lop to 25 q8  per HF   INR today 2.64.  H&H 7.3/24 this AM.  Will repeat CBC at noon, and will send stool guaiac Patient with persistent abdominal tenderness, rate of tube feeds decreased.  No nausea/vomiting.     INR today 2.4H&H 9.1.6 low flow overnight /N&V  NPO resolved for capsule study  Coumadin on hold refusing Tube feeds on D5  normal  @50 cc/hr   INR 2.69  H&H 7..1 refusing Tube feeds on D5  normal  @50 cc/hr. This am + BM Anusha Oneill HF  aware- PRBC x1  GI team consulted -  NPO  plan on study in am-  D/w Dr Cadet Patient  to return to CTU for further management; 1PRBCS    Post op INR 2.2 today.  No bleeding. BC + for SM.  Pt is hypotensive requiring pressor and inotropic support.  ID follow up today on Cefepime will follow.   R PTC for PTX    CT C/A/P: sub q emphysema in R chest wall, GGO RUL, small ascites CTH negative; Abd US: GB thickening, pericholecystic fluid     Perchole drain in place continues to drain total output overnight 133.  Fever today 38.8 given tylenol.  Will repeat BC now.     duplex LE negative     Today:  Continue TC as tolerated. OOBTC and ambulate. Continue monitoring daily LDH as pt is on no anticoagulation.     REVIEW OF SYSTEMS:  Unable to obtain secondary to pt being trached    ICU Vital Signs Last 24 Hrs  T(C): 37.3 (05 Aug 2021 04:00), Max: 37.3 (05 Aug 2021 04:00)  T(F): 99.1 (05 Aug 2021 04:00), Max: 99.1 (05 Aug 2021 04:00)  HR: 101 (05 Aug 2021 07:00) (84 - 108)  BP: --  BP(mean): --  ABP: 94/67 (05 Aug 2021 07:00) (69/62 - 118/107)  ABP(mean): 77 (05 Aug 2021 07:00) (65 - 110)  RR: 23 (05 Aug 2021 07:00) (5 - 46)  SpO2: 100% (05 Aug 2021 07:00) (91% - 100%)      Physical Exam:  Gen:  awake and alert    CNS:  intact, nonfocal   Neck: no JVD, +TC   RES : clear , no wheezing              CVS: +LVAD hum   Abd: Soft, +LLQ tender to palpation   Skin: No rash  Ext:  no edema    ============================I/O===========================   I&O's Detail    04 Aug 2021 07:01  -  05 Aug 2021 07:00  --------------------------------------------------------  IN:    dextrose 5% + sodium chloride 0.45%: 450 mL    Enteral Tube Flush: 290 mL    IV PiggyBack: 250 mL    IV PiggyBack: 200 mL    Miscellaneous Tube Feedin mL    sodium chloride 0.9%: 120 mL  Total IN: 1760 mL    OUT:    Dexmedetomidine: 0 mL    Drain (mL): 275 mL    Voided (mL): 1435 mL  Total OUT: 1710 mL    Total NET: 50 mL        ============================ LABS =========================                        8.1    10.66 )-----------( 228      ( 05 Aug 2021 00:53 )             27.0     08-05    134<L>  |  99  |  5<L>  ----------------------------<  116<H>  3.8   |  24  |  0.55    Ca    8.8      05 Aug 2021 00:53  Phos  2.9     08-  Mg     1.8     -    TPro  6.6  /  Alb  2.7<L>  /  TBili  0.5  /  DBili  x   /  AST  25  /  ALT  30  /  AlkPhos  151<H>      LIVER FUNCTIONS - ( 05 Aug 2021 00:53 )  Alb: 2.7 g/dL / Pro: 6.6 g/dL / ALK PHOS: 151 U/L / ALT: 30 U/L / AST: 25 U/L / GGT: x             ABG - ( 05 Aug 2021 00:50 )  pH, Arterial: 7.44  pH, Blood: x     /  pCO2: 42    /  pO2: 157   / HCO3: 28    / Base Excess: 3.5   /  SaO2: 100                 ======================Micro/Rad/Cardio=================  Culture: Reviewed   CXR: Reviewed  Echo:Reviewed  ======================================================  PAST MEDICAL & SURGICAL HISTORY:  CHF (congestive heart failure)    CAD (coronary artery disease)    Depression    Pleural effusion    History of 2019 novel coronavirus disease (COVID-19)  2020    Hemorrhoids    Bleeding hemorrhoids    Peripheral arterial disease    Claudication    BPH with urinary obstruction    ACC/AHA stage D systolic heart failure    Anticoagulation goal of INR 2.0 to 2.5    Falls    Clavicle fracture    CKD (chronic kidney disease), stage III    Iron deficiency anemia    H/O epistaxis    Vertigo    GI bleed    S/P TVR (tricuspid valve repair)    S/P ventricular assist device    S/P endoscopy      ====================ASSESSMENT ==============  65 YO M with a history of stage D NICM s/p HM2 on 2017 as DT (due to severe PAD) with TV ring, prior COVID-19 infection 2020, recurrent syncope post LVAD s/p ILR, and chronic abdominal pain with prior negative workup who was admitted with symptomatic anemia with Hgb 4.5 in setting of INR 8.8. Hospital course c/b cardiogenic shock, hemodynamic instability, acute hypoxemic respiratory failure s/p tracheostomy, GI bleed S/P Enteroscopy , Anemia, in setting of melena, chronic kidney disease, stress hyperglycemia, C.diff positive on , hypovolemic shock, septic shock.     Plan:  ====================== NEUROLOGY=====================  CTH on  with no acute intracranial hemorrhage or acute territorial infarction  OOBTC and ambulate as tolerated      Tylenol PRN for analgesia   acetaminophen    Suspension .. 650 milliGRAM(s) Oral every 6 hours PRN Mild Pain (1 - 3)    Sleep regimen with   clonazePAM  Tablet 0.5 milliGRAM(s) Oral at bedtime    ==================== RESPIRATORY======================  Acute hypoxemic respiratory failure s/p #8 shiley trach on ; TC vent weaning as tolerated  Requiring intermittent full vent support, continue close monitoring of respiratory rate and breathing pattern, following of ABG’s with A-line monitoring, continuous pulse oximetry monitoring.   CT chest : Sub q emphysema in R chest wall, GGO RUL     Mechanical Ventilation:  Mode: off  Rest on mechanical support over night A/C or CPAP as tolerated    ====================CARDIOVASCULAR==================  Stage D Nonischemic Cardiomyopathy, Status Post HM2 on 2017; LVAD settings 9200 with flow of 6.0  TTE : reveals at least moderate MR, mild AI, severe global LV syst dysfxn, dec RV fxn   Continue invasive hemodynamic monitoring     Rate control with   aMIOdarone    Tablet 200 milliGRAM(s) Oral daily    ===================HEMATOLOGIC/ONC ===================  Anemia due to acute blood in setting of recent upper GI bleed   Continue monitoring daily LDH as pt is on no anticoagulation    Monitor H&H/Plts closely     VTE prophylaxis   heparin   Injectable 5000 Unit(s) SubCutaneous every 8 hours    ===================== RENAL =========================  Continue monitoring urine output, I&OS, BUN/Cr   Replete lytes PRN. Keep K> 4 and Mg >2     ==================== GASTROINTESTINAL===================  CT A/P on : small ascites; ABD US showing GB wall thickening w/ surrounding pericholecystic fluid  S/p cholecystostomy tube placed on : with IR with -60cc of black bile, will monitor site closely.   As per GI can restart trickle feed and titrate as needed   C.diff + on , continue Vanco     2 episodes of emesis yesterday in setting of abdominal pain, as per GI likely component of illeus given critical illness   If worsening abdominal pain or vomiting, recommended CT A/P to r/o toxic megacolon - for now trail  metoclopramide Injectable 10 milliGRAM(s) IV Push every 8 hours    GI prophylaxis   pantoprazole  Injectable 40 milliGRAM(s) IV Push every 12 hours  octreotide  Injectable 50 MICROGram(s) IV Push every 8 hours    Bowel regimen   polyethylene glycol 3350 17 Gram(s) Oral daily  senna 2 Tablet(s) Oral at bedtime    PRN for nausea/vomiting   ondansetron Injectable 4 milliGRAM(s) IV Push every 6 hours PRN Nausea and/or Vomiting    sodium chloride 0.9%. 1000 milliLiter(s) (5 mL/Hr) IV Continuous <Continuous>    =======================    ENDOCRINE  =====================  Stress hyperglycemia, continue glucose control with   insulin lispro (ADMELOG) corrective regimen sliding scale   SubCutaneous every 4 hours    ========================INFECTIOUS DISEASE================  Afebrile, WBC rising 9.81->10.66   Continue trending WBC and monitoring fever curve   SCX on  serratia and stenotro, SCx on  stenotro   Per ID, will consider adding bactrim if clinical condition worsens.     BCx+  +Serratia marcescens, c/w   metroNIDAZOLE  IVPB 500 milliGRAM(s) IV Intermittent every 8 hours    C.diff + on , continue   vancomycin    Solution 125 milliGRAM(s) Oral every 12 hours        Patient requires continuous monitoring with bedside rhythm monitoring, pulse ox monitoring, and intermittent blood gas analysis. Care plan discussed with ICU care team. Patient remained critical and at risk for life threatening decompensation.     By signing my name below, I, Agnieszka Cherry, attest that this documentation has been prepared under the direction and in the presence of CLAUDIA Gale   Electronically signed: Romel Shaffer, 21 @ 08:17    I, Aleksandra Olivas, personally performed the services described in this documentation. all medical record entries made by the romel were at my direction and in my presence. I have reviewed the chart and agree that the record reflects my personal performance and is accurate and complete  Electronically signed: CLAUDIA Gale

## 2021-08-05 NOTE — PROGRESS NOTE ADULT - ASSESSMENT
Mr. Ho Joint initially admitted for melena and anemia in the setting of supratherapeutic INR.  GI reconsulted for worsening anemia.    #Vomiting, abd pain - xray with slightly distended stomach, nonobstructive gas pattern. Likely component of ileus given critical illness. No s/s of toxic megacolon.  #Cdiff - on PO vanc  # Acute blood loss anemia and melena - hemodynamically unchanged. Likely represents recurrent GI bleed. Initial melena workup lead to capsule endoscopy on 6/14/2021 which revealed active bleeding in small bowel.  Single balloon 6/15/2021 revealed old blood in proximal ileum. 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  # LVAD on anticoagulation with coumadin  # Trach  # Tension pneumo s/p chest tube placement 7/26  # Septic shock - 2/2 serratia bacteremia 2/2 cholecystitis  # Acalculous cholecystitis - s/p perc dawn    Recommendations:  - ensure correction of all electrolytes  - ambulate as able  - can c/w reglan TID for now  - if any abd distension, worsening abd pain, worsening vomiting or other decompensation would do CT a/p to r/o toxic megacolon  - rest of care per primary team      Susan Jacobo PGY-5  Gastroenterology Fellow  Pager #[phone] ([facility]) or [phone] (NS)  Available on [employer] Teams.  Please contact on-call GI fellow via answering service ([phone]) after 5pm and before 8am, and on weekends.

## 2021-08-05 NOTE — PROGRESS NOTE ADULT - ATTENDING COMMENTS
Patient seen and examined  Case discussed with Dr Hall    I agree with his interval history exam and plans as noted above    Discussion with CTS/CHF teams would extend IV antibiotics to complete a full 14 day course of IV Cefepime    Morris Prater MD  [phone]  After 5pm/weekends [phone]

## 2021-08-05 NOTE — PROGRESS NOTE ADULT - ASSESSMENT
65 YO M with a history of stage D NICM s/p HM2 on 9/2017 as DT (due to severe PAD) with TV ring, prior COVID-19 infection 4/2020, recurrent syncope post LVAD s/p ILR, and chronic abdominal pain with prior negative workup who was admitted with symptomatic anemia with Hgb 4.5 in setting of INR 8.8 without hemodynamic instability. He was transfused and underwent VCE which showed active bleeding in the mid small bowel but subsequent enteroscopy 6/15 did not reveal any active bleeding. He acutely decompensated after procedure with fever/hypertension, low flow alarms, and pulmonary infiltrate with hypoxia requiring intubation from probable aspiration PNA. His LDH is normal and there are no signs of overt LVAD dysfunction on echo though signs of insufficiently unloaded ventricle for which his speed has been increased. Course notable for inability to wean ventilator with persistent secretions for which he underwent tracheostomy as well as acute c diff colitis. Worsening anemia over last few days requiring transfusion with low flow alarms, concerning for recurrent GI bleed. Now w/ hypotension and fevers c/f sepsis improved. Cxs pos for serratia s/p C tube w/ IR. Overall improving however given protracted hospitalization, will engage palliative care for GOC discussion with family.

## 2021-08-05 NOTE — PROGRESS NOTE ADULT - ASSESSMENT
Assessment and Recommendation:   · Assessment	  Assessment and recommendation :  Recurrent Acute hypoxic respiratory Failure S/P tracheostomy on trach-collar  at 50% FI02  Acute blood loss anemia S/P multiple  blood transfusion   S/P septic shock off vasopressin , levophed and off  Dobutamine   S/P sepsis with serratia marcescens in the blood S/P  cefepime  S/P cholecystostomy tube placement by IR    RT pneumothorax S/P  Pig tail cathter placement and D/C   S/P Acute left lower lobe pneumonia   Aspiration pneumonia   Stool is  +ve for C-diff. colitis on PO vancomycin  improved  Non ischemic cardiomyopathy continue ACE inhibitor and B-Blockers   S/P Septic shock and cardiogenic shock   Stage D systolic heart failure S/P LVAD HM2   MH2 LVAD  with  TV Annuloplasty  Severe peripheral vascular disease   severe hyperglycemia on insulin coverage    cardiac arrythmia  on  Amiodarone   critical care polyneuropathy   Anemia of Acute blood Loss   severe protein caloric malnutrition   S/P Small bowel bleeding   S/P blood and FFP transfusion   Chronic kidney disease stage III   NGT feeding on Jevity   PT and OT at bed side   GI prophylaxis with PPI

## 2021-08-05 NOTE — PROGRESS NOTE ADULT - SUBJECTIVE AND OBJECTIVE BOX
RICKY JOINT  MRN#: [id number]  Subjective:  Pulmonary progress  : recurrent Acute hypoxic respiratory Failure ,aspiration pneumonia, NICM  , chart reviewed and H/O obtained radiological and Laboratory study reviewed patient Examined     65M PMH ACC/AHA stage D HF due to NICM HM2 LVAD , TV annuloplasty ring 17 as destination therapy due to severe peripheral artery disease with significant stenosis  SIADH, Depression, CKD-3 with hyperkalemia, past E. coli UTIs, driveline drainage (21) and COVID-19 (back in 2020)  He was recently seen in clinic where he complained of abdominal pain and dark stools w constipation back in May. He presents to [facility] ER today weakness and fatigue, moderate and + Black stools for three days, on coumadin secondary to warfarin use in the setting of an LVAD. Patient has required transfusions for GIB in the past. Mostly recently back in 2021 pt had anemia with dark stools. No interventions was done at that time. However Last Endoscopy was done in 2020 (negative). Today labs show patient is anemic with H/H of 4.5/16.3,. INR is 8.84 MAP in the 90s, Temp 35.1. He denies any chest pain, shortness of breath, dizziness, abd pain, nausea or vomiting. found to have  rectal bleeding underwent endoscopy ,old blood in the proximal ileum ,  develop sepsis with LL opacity given Antibiotics , Extubated , reintubated , Bronchoscopy on Zosyn for LL pneumonia  and Amiodarone S/P TV Annuloplasty , patient remain intubated on full ventilatory support .S/P multiple units of blood transfusion , remain on full ventilatory support on Precedex and propofol , new central IJ line , diarrhea C diff. +ve on po vancomycin and IV Flagyl,  mildly distended belly , fever start on cefepime 2gm q 8 hrs S/P tracheostomy .  new RT Subclavian central line continue on contact  isolation ,still diarrhea on C-diff antibiotics ENT follow up appreciated , trial of C-PAP as tolerated , , copious secretion from trach. site chest x ray left lower lobe pneumonia , tolerating trch. collar 50% FI02 still excessive secretion need pulmonary toilet , off Ancef antibiotic , no more diarrhea back on full support mechanical ventilator , chest x ray show improvement in LLL air space disease, more awake and responsive on tube feeding no more diarrhea , S/P  Ancef for bacteremia no fever ,ID follow up noted ,  no nausea or vomiting or diarrhea still very weak and tired , event note pulled NG tube now replaced , back on tube feeding ,still on po vancomycin , getting PT and OT at bed side , no plan for decannulation for now. , no more diarrhea receiving PT and OPT at bed side , minimal secretion from tracheostomy site , no SOB getting stronger , improve muscle tone patient transfer to monitor bed still on contact isolation for C-Difficel colitis on 50% FI02, NG tube clogged , and change to resume tube feeding still loose stool . H/H drop significantly require blood transfusion , most likely GI bleeding , IV heparin D/C , vomiting 200 cc of creamy color tube feeding on hold no sob, still melena monitor in the CTU possible capsule endoscopy , H/H is stable ., patient develop TR sided  pneumothorax require chest tube placement , RT IJ central line  placed , develop fever shaking chills , blood culture positive for serratia marcescens , start on cefepime .the patient  become hypoxic and hypotensive placed on full ventilatory support and Vasopressin , levophed and Dobutamine ,S/P blood transfusion on meropenem and vancomycin , IR note appreciated   , on and  off pressors , occasional agitation on Precedex .S/P IR cholecystostomy tube drainage placement in the RT upper Quadrant , resume anticoagulation chest x ray noted C-PAP trail lasted only for 2 hrs , new RT SC line and D/C RT IJ line , RT pig tail cathter has been removed , tolerating C-PAP trial placed on trach. collar 50% FI02 GI consultant noted on NG tube feeding as tolerated          (2021 16:57)    PAST MEDICAL & SURGICAL HISTORY:  CHF (congestive heart failure)    CAD (coronary artery disease)  Depression    Pleural effusion    History of 2019 novel coronavirus disease (COVID-19)  2020    Hemorrhoids    Bleeding hemorrhoids    Peripheral arterial disease    Claudication    BPH with urinary obstruction    ACC/AHA stage D systolic heart failure  Anticoagulation goal of INR 2.0 to 2.5    Falls    Clavicle fracture    CKD (chronic kidney disease), stage III    Iron deficiency anemia    H/O epistaxis    Vertigo    GI bleed    S/P TVR (tricuspid valve repair)    S/P ventricular assist device    S/P endoscopy    OBJECTIVE:  ICU Vital Signs Last 24 Hrs  T(C): 38.2 (2021 10:00), Max: 38.5 (2021 12:00)  T(F): 100.8 (2021 10:00), Max: 101.3 (2021 12:00)  HR: 65 (2021 10:00) (61 - 69)  BP: --  BP(mean): --  ABP: 105/67 (2021 10:00) (90/54 - 113/64)  ABP(mean): 77 (2021 10:00) (63 - 77)  RR: 20 (2021 10:00) (19 - 35)  SpO2: 99% (2021 10:00) (96% - 100%)       @ 07:  -   @ 07:00  --------------------------------------------------------  IN: 2693.9 mL / OUT: 1415 mL / NET: 1278.9 mL     @ 07:  -   @ 10:49  --------------------------------------------------------  IN: 420.8 mL / OUT: 115 mL / NET: 305.8 mL  PHYSICAL EXAM:Daily   Elderly male S/P tracheostomy   on trach-collar  50% FI02      Daily Weight in k.4 (2021 00:00)  HEENT:     + NCAT  + EOMI  - Conjuctival edema   - Icterus   - Thrush   - ETT  + NGT/OGT  Neck:         + FROM  RT SCl line JVD  - Nodes - Masses + Mid-line trachea + Tracheostomy  Chest:            normal A-P diameter    Lungs:          + CTA   + Rhonchi    - Rales    - Wheezing + Decreased  LT BS   - Dullness R L  Cardiac:       + S1 + S2    + RRR   - Irregular   - S3  - S4    - Murmurs   - Rub   - Hamman’s sign   Abdomen:    + BS  + Soft + Non-tender - Distended - Organomegaly - PEG .cholecystostomy tube in place  Extremities:   - Cyanosis U/L   - Clubbing  U/L  + LE/UE Edema   + Capillary refill    + Pulses   Neuro:        - Awake   -  Alert   - Confused   - Lethargic   + Sedated  + Generalized Weakness  Skin:        - Rashes    - Erythema   + Normal incisions   + IV sites intact          HOSPITAL MEDICATIONS: All medications reviewed and analyzed  MEDICATIONS  (STANDING):  amiodarone    Tablet 200 milliGRAM(s) Oral daily  chlorhexidine 0.12% Liquid 15 milliLiter(s) Oral Mucosa every 12 hours  chlorhexidine 2% Cloths 1 Application(s) Topical <User Schedule>  dexmedetomidine Infusion 0.5 MICROgram(s)/kG/Hr (9.81 mL/Hr) IV Continuous <Continuous>  dextrose 50% Injectable 50 milliLiter(s) IV Push every 15 minutes  heparin  Infusion 400 Unit(s)/Hr (12.5 mL/Hr) IV Continuous <Continuous>  Hydromorphone  Injectable 0.5 milliGRAM(s) IV Push once  insulin lispro (ADMELOG) corrective regimen sliding scale   SubCutaneous every 6 hours  pantoprazole  Injectable 40 milliGRAM(s) IV Push every 12 hours  piperacillin/tazobactam IVPB.. 3.375 Gram(s) IV Intermittent every 8 hours  propofol Infusion 20 MICROgram(s)/kG/Min (9.42 mL/Hr) IV Continuous <Continuous>  sodium chloride 0.9% lock flush 3 milliLiter(s) IV Push every 8 hours  sodium chloride 0.9%. 1000 milliLiter(s) (10 mL/Hr) IV Continuous <Continuous>    MEDICATIONS  (PRN):  acetaminophen    Suspension .. 650 milliGRAM(s) Oral every 6 hours PRN Temp greater or equal to 38C (100.4F)    LABS: All Lab data reviewed and analyzed                         8.1    10.66 )-----------( 228      ( 05 Aug 2021 00:53 )             27.0   08-05    134<L>  |  99  |  5<L>  ----------------------------<  116<H>  3.8   |  24  |  0.55    Ca    8.8      05 Aug 2021 00:53  Phos  2.9     08-05  Mg     1.8     08-05    TPro  6.6  /  Alb  2.7<L>  /  TBili  0.5  /  DBili  x   /  AST  25  /  ALT  30  /  AlkPhos  151<H>      Ca    9.0      04 Aug 2021 00:10  Phos  2.8     08-04  Mg     1.7         TPro  6.7  /  Alb  2.9<L>  /  TBili  0.6  /  DBili  x   /  AST  25  /  ALT  40  /  AlkPhos  165<H>      Ca    8.8      03 Aug 2021 01:01  Phos  2.6     08-03  Mg     1.8         TPro  6.5  /  Alb  3.0<L>  /  TBili  0.5  /  DBili  x   /  AST  17  /  ALT  47<H>  /  AlkPhos  171<H>      Ca    8.7      02 Aug 2021 00:41  Phos  2.8     08-  Mg     1.7         TPro  6.2  /  Alb  2.6<L>  /  TBili  0.5  /  DBili  x   /  AST  15  /  ALT  60<H>  /  AlkPhos  169<H>      Ca    8.8      01 Aug 2021 00:32  Phos  2.9     08-  Mg     1.8         TPro  6.6  /  Alb  3.0<L>  /  TBili  0.7  /  DBili  x   /  AST  21  /  ALT  93<H>  /  AlkPhos  181<H>      Ca    8.6      2021 00:25  Phos  3.2     -  Mg     2.0         TPro  6.2  /  Alb  2.7<L>  /  TBili  1.2  /  DBili  x   /  AST  43<H>  /  ALT  131<H>  /  AlkPhos  194<H>      Ca    9.1      2021 00:58  Phos  1.5     -30  Mg     1.9         TPro  6.4  /  Alb  3.0<L>  /  TBili  1.3<H>  /  DBili  x   /  AST  117<H>  /  ALT  210<H>  /  AlkPhos  224<H>                                                                               PTT - ( 2021 04:52 )  PTT:45.2 sec LIVER FUNCTIONS - ( 2021 00:42 )  Alb: 3.4 g/dL / Pro: 6.7 g/dL / ALK PHOS: 213 U/L / ALT: 15 U/L / AST: 24 U/L / GGT: x           RADIOLOGY: - Reviewed and analyzed RT Pig tail cathter  , LVAD HM2, CT scan of abdomen reviewed result noted

## 2021-08-05 NOTE — PROGRESS NOTE ADULT - PROBLEM SELECTOR PLAN 5
-s/p trach 6/25, now back on vent in setting of sepsis and pneumothorax. Wean as tolerated. -s/p trach 6/25

## 2021-08-05 NOTE — PROGRESS NOTE ADULT - PROBLEM SELECTOR PLAN 1
- holding metoprolol in setting of sepsis- may restart tomorrow given stability  - Continue home amiodarone 200 mg PO daily  - currently doing well off dobutamine - Continue home amiodarone 200 mg PO daily  - currently doing well off dobutamine

## 2021-08-05 NOTE — PROGRESS NOTE ADULT - PROBLEM SELECTOR PLAN 3
- serratia bacteremia and poss acalculous cholecystitis  - on cefepmie/flagyl  - d/w transplant ID for duration  - PO vanc for C Diff ppx due to broad spectrum Abx

## 2021-08-05 NOTE — PROGRESS NOTE ADULT - ASSESSMENT
66 years old male with a history of stage D NICM s/p HM2 on 9/2017 as DT (due to severe PAD) with TV ring, prior COVID-19 infection 4/2020, recurrent syncope post LVAD s/p ILR, admitted on 6/14/21with symptomatic anemia with Hgb 4.5 in setting of INR 8.8 without hemodynamic instability. He was transfused and underwent VCE which showed active bleeding in the mid small bowel but subsequent enteroscopy 6/15 did not reveal any active bleeding. He acutely decompensated after procedure with fever/hypertension, low flow alarms, and pulmonary infiltrate with hypoxia requiring intubation from probable aspiration PNA. Course notable for inability to wean ventilator with persistent secretions for which he underwent tracheostomy. He developed C. diff colitis and is on vancomycin taper. He is currently vent dependent now and requiring pressor support. BCx showed serratia on 7/26, which correlates to his sputum culture on 7/7 and 7/27. The source is most likely from pneumonia or cholecystitis. Transaminitis could be secondary to acalculous cholecystitis. Stenotrophomonas is likely colonization and does not need to be treated unless patient decompensated on current treatment or having increased ventilation requirement.      IMPRESSION:  Resolved septic shock secondary to high grade Serratia bacteremia, secondary to cholecystitis, resolved  Tension pneumothorax s/p right chest tube 7/26  Resolved transaminitis, likely acalculous cholecystitis s/p perc dawn tube 7/30  C. diff colitis on PO vancomycin  ICM s/p LVAD  BCx serratia 7/26, NGTD since 7/27  Sputum culture serratia 7/7, serratia and stenotrophomonas 7/27, stenotrophomonas 8/1  Biliary Cx negative (collected after abx started)  CT consolidation on left lower lobe, improved from CT in June 2021  No cholangitis  No signs of drive line infection      RECOMMENDATIONS:  - Continue with IV cefepime 1gm q8hrs and IV Flagyl 500mg q8hrs for a total of 10 days (ends on 8/5) for GNR bacteremia  - Hold off on treating Stenotrophomonas for now unless he deteriorates on current treatment or with signs of worsening PNA  - Continue PO vancomycin 125mg q12hrs while on board-spectrum antibiotics and stop 48 hours after abx course  - Will continue to follow  - Guarded prognosis      * THIS IS AN INCOMPLETE NOTE. FINAL RECOMMENDATION WILL BE UPDATED AFTER DISCUSSING WITH ATTENDING *        Marisa Hall DO  PGY4 ID fellow  Pager: [phone]  [facility] pager ID: [phone]  Please contact me via page or text through Microsoft Teams  If after 5PM or on weekends, please call [phone]     66 years old male with a history of stage D NICM s/p HM2 on 9/2017 as DT (due to severe PAD) with TV ring, prior COVID-19 infection 4/2020, recurrent syncope post LVAD s/p ILR, admitted on 6/14/21with symptomatic anemia with Hgb 4.5 in setting of INR 8.8 without hemodynamic instability. He was transfused and underwent VCE which showed active bleeding in the mid small bowel but subsequent enteroscopy 6/15 did not reveal any active bleeding. He acutely decompensated after procedure with fever/hypertension, low flow alarms, and pulmonary infiltrate with hypoxia requiring intubation from probable aspiration PNA. Course notable for inability to wean ventilator with persistent secretions for which he underwent tracheostomy. He developed C. diff colitis and is on vancomycin taper. He is currently vent dependent now and requiring pressor support. BCx showed serratia on 7/26, which correlates to his sputum culture on 7/7 and 7/27. The source is most likely from pneumonia or cholecystitis. Transaminitis could be secondary to acalculous cholecystitis. Stenotrophomonas is likely colonization and does not need to be treated unless patient decompensated on current treatment or having increased ventilation requirement.      IMPRESSION:  Resolved septic shock secondary to high grade Serratia bacteremia, secondary to cholecystitis  Tension pneumothorax s/p right chest tube 7/26  Resolved transaminitis, likely acalculous cholecystitis s/p perc dawn tube 7/30  C. diff colitis on PO vancomycin  ICM s/p LVAD  BCx serratia 7/26, NGTD since 7/27  Sputum culture serratia 7/7, serratia and stenotrophomonas 7/27, stenotrophomonas 8/1  Biliary Cx negative (collected after abx started)  CT consolidation on left lower lobe, improved from CT in June 2021  No cholangitis  No signs of drive line infection    R subclavian TLC 8/1      RECOMMENDATIONS:  - Continue with IV cefepime 1gm q8hrs and IV Flagyl 500mg q8hrs for a total of 10 days (ends today) for GNR bacteremia  - Hold off on treating Stenotrophomonas for now unless he deteriorates on current treatment or with signs of worsening PNA  - Continue PO vancomycin 125mg q12hrs while on board-spectrum antibiotics and stop 48 hours after abx course  - Follow up with surgery for the duration of percutaneous cholecystostomy tube  - D/C TLC if patient is hemodynamically stable  - Aggressive pulmonary toileting  - Will continue to follow  - Guarded prognosis      Case discussed with attending and primary team      Marisa Hall DO  PGY4 ID fellow  Pager: [phone]  [facility] pager ID: [phone]  Please contact me via page or text through Microsoft Teams  If after 5PM or on weekends, please call [phone]     66 years old male with a history of stage D NICM s/p HM2 on 9/2017 as DT (due to severe PAD) with TV ring, prior COVID-19 infection 4/2020, recurrent syncope post LVAD s/p ILR, admitted on 6/14/21with symptomatic anemia with Hgb 4.5 in setting of INR 8.8 without hemodynamic instability. He was transfused and underwent VCE which showed active bleeding in the mid small bowel but subsequent enteroscopy 6/15 did not reveal any active bleeding. He acutely decompensated after procedure with fever/hypertension, low flow alarms, and pulmonary infiltrate with hypoxia requiring intubation from probable aspiration PNA. Course notable for inability to wean ventilator with persistent secretions for which he underwent tracheostomy. He developed C. diff colitis and is on vancomycin taper. He is currently vent dependent now and requiring pressor support. BCx showed serratia on 7/26, which correlates to his sputum culture on 7/7 and 7/27. The source is most likely from pneumonia or cholecystitis. Transaminitis could be secondary to acalculous cholecystitis. Stenotrophomonas is likely colonization and does not need to be treated unless patient decompensated on current treatment or having increased ventilation requirement.      IMPRESSION:  Resolved septic shock secondary to high grade Serratia bacteremia, secondary to cholecystitis  Tension pneumothorax s/p right chest tube 7/26  Resolved transaminitis, likely acalculous cholecystitis s/p perc dawn tube 7/30  C. diff colitis on PO vancomycin  ICM s/p LVAD  BCx serratia 7/26, NGTD since 7/27  Sputum culture serratia 7/7, serratia and stenotrophomonas 7/27, stenotrophomonas 8/1  Biliary Cx negative (collected after abx started)  CT consolidation on left lower lobe, improved from CT in June 2021  No cholangitis  No signs of drive line infection    R subclavian TLC 8/1      RECOMMENDATIONS:  - Continue with IV cefepime 1gm q8hrs and IV Flagyl 500mg q8hrs for a total of 14 days for GNR bacteremia  - Hold off on treating Stenotrophomonas for now unless he deteriorates on current treatment or with signs of worsening PNA  - Continue PO vancomycin 125mg q12hrs while on board-spectrum antibiotics and stop 48 hours after abx course  - Follow up with surgery for the duration of percutaneous cholecystostomy tube  - D/C TLC if patient is hemodynamically stable  - Aggressive pulmonary toileting  - Will continue to follow  - Guarded prognosis      Case discussed with attending and primary team      Marisa Hall DO  PGY4 ID fellow  Pager: [phone]  [facility] pager ID: [phone]  Please contact me via page or text through Microsoft Teams  If after 5PM or on weekends, please call [phone]

## 2021-08-05 NOTE — PROGRESS NOTE ADULT - SUBJECTIVE AND OBJECTIVE BOX
Chief Complaint:  Patient is a 66y old  Male who presents with a chief complaint of Anemia, Supratherapeutic INR, Dark Stools (05 Aug 2021 11:04)      Interval Events: no acute events overnight  - denies abd pain this AM  - no vomiting overnight, at 50cc/hr [facility] [place]      Hospital Medications:  acetaminophen    Suspension .. 650 milliGRAM(s) Oral every 6 hours PRN  aMIOdarone    Tablet 200 milliGRAM(s) Oral daily  chlorhexidine 0.12% Liquid 15 milliLiter(s) Oral Mucosa every 12 hours  chlorhexidine 2% Cloths 1 Application(s) Topical <User Schedule>  clonazePAM  Tablet 0.5 milliGRAM(s) Oral at bedtime  heparin   Injectable 5000 Unit(s) SubCutaneous every 8 hours  insulin lispro (ADMELOG) corrective regimen sliding scale   SubCutaneous every 4 hours  metoclopramide Injectable 10 milliGRAM(s) IV Push every 8 hours  metroNIDAZOLE  IVPB 500 milliGRAM(s) IV Intermittent every 8 hours  octreotide  Injectable 50 MICROGram(s) IV Push every 8 hours  ondansetron Injectable 4 milliGRAM(s) IV Push every 6 hours PRN  pantoprazole  Injectable 40 milliGRAM(s) IV Push every 12 hours  polyethylene glycol 3350 17 Gram(s) Oral daily  senna 2 Tablet(s) Oral at bedtime  sodium chloride 0.9%. 1000 milliLiter(s) IV Continuous <Continuous>  vancomycin    Solution 125 milliGRAM(s) Oral every 12 hours      PMHX/PSHX:  No pertinent past medical history    CHF (congestive heart failure)    CAD (coronary artery disease)    Depression    Pleural effusion    History of 2019 novel coronavirus disease (COVID-19)    Hemorrhoids    Bleeding hemorrhoids    Peripheral arterial disease    Claudication    BPH with urinary obstruction    ACC/AHA stage D systolic heart failure    Anticoagulation goal of INR 2.0 to 2.5    Falls    Clavicle fracture    CKD (chronic kidney disease), stage III    Iron deficiency anemia    H/O epistaxis    Vertigo    GI bleed    No significant past surgical history    S/P TVR (tricuspid valve repair)    S/P ventricular assist device    S/P endoscopy            ROS:     General:  No weight loss, fevers, chills, night sweats, fatigue   Eyes:  No vision changes  ENT:  No sore throat, pain, runny nose  CV:  No chest pain, palpitations, dizziness   Resp:  No SOB, cough, wheezing  GI:  See HPI  :  No burning with urination, hematuria  Muscle:  No pain, weakness  Neuro:  No weakness/tingling, memory problems  Psych:  No fatigue, insomnia, mood problems, depression  Heme:  No easy bruisability  Skin:  No rash, edema      PHYSICAL EXAM:     GENERAL:  chronically ill appearing, awake and alert  HEENT:  trach in place, NGT in place  CHEST:  Full & symmetric excursion, no increased effort w/ respirations  HEART:  Regular rhythm & rate  ABDOMEN:  Soft, non-distended, slight tenderness to palpation throughout, dawn tube in place  EXTREMITIES:  no LE  edema  SKIN:  No rash/erythema/ecchymoses/petechiae/wounds/jaundice  NEURO:  Alert, awake, nods answers to questions    Vital Signs:  Vital Signs Last 24 Hrs  T(C): 36.7 (05 Aug 2021 12:00), Max: 37.3 (05 Aug 2021 04:00)  T(F): 98.1 (05 Aug 2021 12:00), Max: 99.1 (05 Aug 2021 04:00)  HR: 106 (05 Aug 2021 13:00) (58 - 108)  BP: --  BP(mean): --  RR: 55 (05 Aug 2021 13:00) (5 - 55)  SpO2: 100% (05 Aug 2021 13:00) (91% - 100%)  Daily     Daily Weight in k.1 (05 Aug 2021 00:00)    LABS:                        8.1    10.66 )-----------( 228      ( 05 Aug 2021 00:53 )             27.0     08-05    134<L>  |  99  |  5<L>  ----------------------------<  116<H>  3.8   |  24  |  0.55    Ca    8.8      05 Aug 2021 00:53  Phos  2.9     08-05  Mg     1.8     08-05    TPro  6.6  /  Alb  2.7<L>  /  TBili  0.5  /  DBili  x   /  AST  25  /  ALT  30  /  AlkPhos  151<H>  08-05    LIVER FUNCTIONS - ( 05 Aug 2021 00:53 )  Alb: 2.7 g/dL / Pro: 6.6 g/dL / ALK PHOS: 151 U/L / ALT: 30 U/L / AST: 25 U/L / GGT: x                   Imaging:

## 2021-08-05 NOTE — PROGRESS NOTE ADULT - PROBLEM SELECTOR PLAN 2
- Current speed 9200 RPM  - no alarms  - HOLD coumadin given recurrent bleed. Will hold indefinitely.   - Will plan to hold aspirin indefinitely given recurrent GIB.  - Continue to monitor LDH (221 today) daily as he is off anticoagulation  - appreciate palliative care consult

## 2021-08-05 NOTE — PROGRESS NOTE ADULT - ATTENDING COMMENTS
No more n/v  Tolerating TF  No abdominal pain  Walking around unit with assistance daily    No further GI workup indicated at this time

## 2021-08-06 NOTE — DISCHARGE NOTE PROVIDER - NSDCFUSCHEDAPPT_GEN_ALL_CORE_FT
JOINT, RICKY ; 08/20/2021 ; NPP [facility] RICKY Dow ; 10/01/2021 ; NP [facility]  BERTA RICKY ; 12/10/2021 ; NPP [facility]

## 2021-08-06 NOTE — PROGRESS NOTE ADULT - SUBJECTIVE AND OBJECTIVE BOX
Kiana Wheat MD  Cardiology Fellow  [phone]  All Cardiology service information can be found 24/7 on [email or web address], password: [email or web address]    Patient seen and examined at bedside.    Overnight Events:     Review Of Systems: No chest pain, shortness of breath, or palpitations            Current Meds:  acetaminophen    Suspension .. 650 milliGRAM(s) Oral every 6 hours PRN  aMIOdarone    Tablet 200 milliGRAM(s) Oral daily  chlorhexidine 0.12% Liquid 15 milliLiter(s) Oral Mucosa every 12 hours  chlorhexidine 2% Cloths 1 Application(s) Topical <User Schedule>  clonazePAM  Tablet 0.5 milliGRAM(s) Oral at bedtime  heparin   Injectable 5000 Unit(s) SubCutaneous every 8 hours  insulin lispro (ADMELOG) corrective regimen sliding scale   SubCutaneous every 6 hours  metoclopramide Injectable 10 milliGRAM(s) IV Push every 8 hours  octreotide  Injectable 50 MICROGram(s) IV Push every 8 hours  ondansetron Injectable 4 milliGRAM(s) IV Push every 6 hours PRN  pantoprazole  Injectable 40 milliGRAM(s) IV Push every 12 hours  polyethylene glycol 3350 17 Gram(s) Oral daily  senna 2 Tablet(s) Oral at bedtime  sodium chloride 0.9%. 1000 milliLiter(s) IV Continuous <Continuous>  vancomycin    Solution 125 milliGRAM(s) Oral every 12 hours      Vitals:  T(F): 96.8 (08-06), Max: 98.1 (08-05)  HR: 63 (08-06) (58 - 110)  BP: --  RR: 19 (08-06)  SpO2: 100% (08-06)  I&O's Summary    05 Aug 2021 07:01  -  06 Aug 2021 07:00  --------------------------------------------------------  IN: 1084.8 mL / OUT: 2500 mL / NET: -1415.2 mL    06 Aug 2021 07:01  -  06 Aug 2021 08:58  --------------------------------------------------------  IN: 35 mL / OUT: 450 mL / NET: -415 mL        Physical Exam:  Appearance: No acute distress; well appearing  Eyes: PERRL, EOMI, pink conjunctiva  HEENT: Normal oral mucosa  Cardiovascular: RRR, S1, S2, no murmurs, rubs, or gallops; no edema; no JVD  Respiratory: Clear to auscultation bilaterally  Gastrointestinal: soft, non-tender, non-distended with normal bowel sounds  Musculoskeletal: No clubbing; no joint deformity   Neurologic: Non-focal  Lymphatic: No lymphadenopathy  Psychiatry: AAOx3, mood & affect appropriate  Skin: No rashes, ecchymoses, or cyanosis                          8.3    8.77  )-----------( 242      ( 06 Aug 2021 01:30 )             27.0     08-06    137  |  102  |  5<L>  ----------------------------<  106<H>  3.8   |  26  |  0.56    Ca    9.5      06 Aug 2021 01:30  Phos  3.0     08-06  Mg     1.8     08-06    TPro  7.2  /  Alb  3.1<L>  /  TBili  0.5  /  DBili  x   /  AST  27  /  ALT  29  /  AlkPhos  150<H>  08-06                  New ECG(s): Personally reviewed    Echo:    Stress Testing:     Cath:    Imaging:    Interpretation of Telemetry:   Kiana Wheat MD  Cardiology Fellow  [phone]  All Cardiology service information can be found 24/7 on [email or web address], password: [email or web address]    Patient seen and examined at bedside.    Overnight Events: Patient with persistent abdominal pain.     Review Of Systems: No chest pain, shortness of breath, or palpitations            Current Meds:  acetaminophen    Suspension .. 650 milliGRAM(s) Oral every 6 hours PRN  aMIOdarone    Tablet 200 milliGRAM(s) Oral daily  chlorhexidine 0.12% Liquid 15 milliLiter(s) Oral Mucosa every 12 hours  chlorhexidine 2% Cloths 1 Application(s) Topical <User Schedule>  clonazePAM  Tablet 0.5 milliGRAM(s) Oral at bedtime  heparin   Injectable 5000 Unit(s) SubCutaneous every 8 hours  insulin lispro (ADMELOG) corrective regimen sliding scale   SubCutaneous every 6 hours  metoclopramide Injectable 10 milliGRAM(s) IV Push every 8 hours  octreotide  Injectable 50 MICROGram(s) IV Push every 8 hours  ondansetron Injectable 4 milliGRAM(s) IV Push every 6 hours PRN  pantoprazole  Injectable 40 milliGRAM(s) IV Push every 12 hours  polyethylene glycol 3350 17 Gram(s) Oral daily  senna 2 Tablet(s) Oral at bedtime  sodium chloride 0.9%. 1000 milliLiter(s) IV Continuous <Continuous>  vancomycin    Solution 125 milliGRAM(s) Oral every 12 hours      Vitals:  T(F): 96.8 (08-06), Max: 98.1 (08-05)  HR: 63 (08-06) (58 - 110)  BP: --  RR: 19 (08-06)  SpO2: 100% (08-06)  I&O's Summary    05 Aug 2021 07:01  -  06 Aug 2021 07:00  --------------------------------------------------------  IN: 1084.8 mL / OUT: 2500 mL / NET: -1415.2 mL    06 Aug 2021 07:01  -  06 Aug 2021 08:58  --------------------------------------------------------  IN: 35 mL / OUT: 450 mL / NET: -415 mL        Physical Exam:  Appearance: No acute distress; well appearing  Eyes: PERRL, EOMI, pink conjunctiva  HEENT: Normal oral mucosa  Cardiovascular: RRR, S1, S2, no murmurs, rubs, or gallops; no edema; no JVD  Respiratory: Clear to auscultation bilaterally  Gastrointestinal: soft, non-tender, non-distended with normal bowel sounds  Musculoskeletal: No clubbing; no joint deformity   Neurologic: Non-focal               8.3    8.77  )-----------( 242      ( 06 Aug 2021 01:30 )             27.0     08-06    137  |  102  |  5<L>  ----------------------------<  106<H>  3.8   |  26  |  0.56    Ca    9.5      06 Aug 2021 01:30  Phos  3.0     08-06  Mg     1.8     08-06    TPro  7.2  /  Alb  3.1<L>  /  TBili  0.5  /  DBili  x   /  AST  27  /  ALT  29  /  AlkPhos  150<H>  08-06

## 2021-08-06 NOTE — DISCHARGE NOTE PROVIDER - PROVIDER TOKENS
PROVIDER:[TOKEN:[[id number]],FOLLOWUP:[Routine]],PROVIDER:[TOKEN:[[id number]],FOLLOWUP:[Routine]] PROVIDER:[TOKEN:[[id number]],FOLLOWUP:[Routine]],PROVIDER:[TOKEN:[[id number]],FOLLOWUP:[Routine]],PROVIDER:[TOKEN:[[id number]],FOLLOWUP:[Routine]]

## 2021-08-06 NOTE — PROGRESS NOTE ADULT - ATTENDING COMMENTS
Patient seen and examined with Dr Hall    I agree with his interval history exam and plans as noted above    Afebrile on trach collar  cholecystotomy tube draining bilious fluid  LVAD dressing dry and intact    Completing 14 days of total antibiotics for serratia sepsis  Oral Vanco for secondary prophylaxis    Morris Prater MD  [phone]  After 5pm/weekends [phone]

## 2021-08-06 NOTE — PROGRESS NOTE ADULT - SUBJECTIVE AND OBJECTIVE BOX
Interventional Radiology Follow-Up Note.     Patient seen and examined @ bedside between 7:30and 8:00am.     This is a 66y Male s/p perc dawn on 7/30 in Interventional Radiology with Dr. Cole.       Medication:     aMIOdarone    Tablet: (08-06)  cefepime   IVPB: (08-05)  heparin   Injectable: (08-06)  metroNIDAZOLE  IVPB: (08-05)  vancomycin    Solution: (08-06)    Vitals:   T(F): 96.8, Max: 98.1 (12:00)  HR: 64  BP: --  RR: 20  SpO2: 100%    Physical Exam:  General: Nontoxic, in NAD.  Abdomen: soft, NTND.   cholecystostomy Drain Device: Drain intact attached to gravity drain bag. Drain with bilious out put.  Dressing clean, dry, intact.     24hr Drain output: 325cc.    LABS:  Aspartate Aminotransferase (AST/SGOT): 27 U/L (08-06-21 @ 01:30)  Alanine Aminotransferase (ALT/SGPT): 29 U/L (08-06-21 @ 01:30)  Aspartate Aminotransferase (AST/SGOT): 25 U/L (08-05-21 @ 00:53)  Alanine Aminotransferase (ALT/SGPT): 30 U/L (08-05-21 @ 00:53)        LABS:  Na: 137 (08-06 @ 01:30), 134 (08-05 @ 00:53), 132 (08-04 @ 00:10)  K: 3.8 (08-06 @ 01:30), 3.8 (08-05 @ 00:53), 4.0 (08-04 @ 00:10)  Cl: 102 (08-06 @ 01:30), 99 (08-05 @ 00:53), 98 (08-04 @ 00:10)  CO2: 26 (08-06 @ 01:30), 24 (08-05 @ 00:53), 24 (08-04 @ 00:10)  BUN: 5 (08-06 @ 01:30), 5 (08-05 @ 00:53), 9 (08-04 @ 00:10)  Cr: 0.56 (08-06 @ 01:30), 0.55 (08-05 @ 00:53), 0.56 (08-04 @ 00:10)  Glu: 106(08-06 @ 01:30), 116(08-05 @ 00:53), 123(08-04 @ 00:10)    Hgb: 8.3 (08-06 @ 01:30), 8.1 (08-05 @ 00:53), 8.1 (08-04 @ 00:10)  Hct: 27.0 (08-06 @ 01:30), 27.0 (08-05 @ 00:53), 26.3 (08-04 @ 00:10)  WBC: 8.77 (08-06 @ 01:30), 10.66 (08-05 @ 00:53), 9.81 (08-04 @ 00:10)  Plt: 242 (08-06 @ 01:30), 228 (08-05 @ 00:53), 210 (08-04 @ 00:10)    INR:   PTT:           LIVER FUNCTIONS - ( 06 Aug 2021 01:30 )  Alb: 3.1 g/dL / Pro: 7.2 g/dL / ALK PHOS: 150 U/L / ALT: 29 U/L / AST: 27 U/L / GGT: x           ABG - ( 06 Aug 2021 01:24 )  pH, Arterial: 7.49  pH, Blood: x     /  pCO2: 38    /  pO2: 172   / HCO3: 29    / Base Excess: 5.7   /  SaO2: 100       Assessment/Plan: 65 YO M with a history of stage D NICM s/p HM2 on 9/2017 as DT (due to severe PAD) with TV ring, prior COVID-19 infection 4/2020, recurrent syncope post LVAD s/p ILR, and chronic abdominal pain with prior negative workup who was admitted with symptomatic anemia with Hgb 4.5 in setting of INR 8.8 without hemodynamic instability. He was transfused and underwent VCE which showed active bleeding in the mid small bowel but subsequent enteroscopy 6/15 did not reveal any active bleeding. He acutely decompensated after procedure with fever/hypertension, low flow alarms, and pulmonary infiltrate with hypoxia requiring intubation from probable aspiration PNA. His LDH is normal and there are no signs of overt LVAD dysfunction on echo though signs of insufficiently unloaded ventricle for which his speed has been increased. Course notable for inability to wean ventilator with persistent secretions for which he underwent tracheostomy as well as acute c diff colitis. Worsening anemia over last few days requiring transfusion with low flow alarms, concerning for recurrent GI bleed. Now w/ hypotension and fevers. Pt found to have acute cholecystitis s/p cholecystostomy drain on  7/30. Afebrile.    - no growth on culture.  - on IV abx.  - Flush drain as ordered; DO NOT aspirate  - Change dressing q3 days or when dressing is saturated.  -  Monitor h/h; transfuse as needed  - Trend vs/labs  - Continue global management per primary team  - The patient will need surgical follow up to decide if he is a surgical candidate after he is stable.  If he has cholecystectomy surgery the drain will be removed at the time of surgery. He should follow up with IR for initial cholecystostomy tube check 4-6 weeks after placement.  After initial follow up with IR if the drainage catheter remains in then he should have routine exchange of drainage every 3 months.   - Pt will benefit from VNS service to help with drainage catheter care; Pt should continue same drainage catheter care as an outpatient.  - IR will sign off.    - D/w Dr. carolyn marino.          - Flush drain as ordered; DO NOT aspirate  - Change dressing q3 days or when dressing is saturated.  -  Monitor h/h; transfuse as needed  - Trend vs/labs  - Continue global management per primary team  - If the patient is d/c home with drainage catheter, pt can make an appointment with IR by calling the IR booking office at [phone]; recommend IR follow in _____wks/months or when the out put is less than 10cc/24hrs period for tube evaluation.  - Pt will benefit from VNS service to help with drainage catheter care; Pt should continue same drainage catheter care as an outpatient.   - D/w     Please call IR at  [phone] with any questions, concerns, or issues regarding above.    Also available on Teams.

## 2021-08-06 NOTE — PROGRESS NOTE ADULT - SUBJECTIVE AND OBJECTIVE BOX
RICKY JOINT  MRN#: [id number]  Subjective:  Pulmonary progress  : recurrent Acute hypoxic respiratory Failure ,aspiration pneumonia, NICM  , chart reviewed and H/O obtained radiological and Laboratory study reviewed patient Examined     65M PMH ACC/AHA stage D HF due to NICM HM2 LVAD , TV annuloplasty ring 17 as destination therapy due to severe peripheral artery disease with significant stenosis  SIADH, Depression, CKD-3 with hyperkalemia, past E. coli UTIs, driveline drainage (21) and COVID-19 (back in 2020)  He was recently seen in clinic where he complained of abdominal pain and dark stools w constipation back in May. He presents to [facility] ER today weakness and fatigue, moderate and + Black stools for three days, on coumadin secondary to warfarin use in the setting of an LVAD. Patient has required transfusions for GIB in the past. Mostly recently back in 2021 pt had anemia with dark stools. No interventions was done at that time. However Last Endoscopy was done in 2020 (negative). Today labs show patient is anemic with H/H of 4.5/16.3,. INR is 8.84 MAP in the 90s, Temp 35.1. He denies any chest pain, shortness of breath, dizziness, abd pain, nausea or vomiting. found to have  rectal bleeding underwent endoscopy ,old blood in the proximal ileum ,  develop sepsis with LL opacity given Antibiotics , Extubated , reintubated , Bronchoscopy on Zosyn for LL pneumonia  and Amiodarone S/P TV Annuloplasty , patient remain intubated on full ventilatory support .S/P multiple units of blood transfusion , remain on full ventilatory support on Precedex and propofol , new central IJ line , diarrhea C diff. +ve on po vancomycin and IV Flagyl,  mildly distended belly , fever start on cefepime 2gm q 8 hrs S/P tracheostomy .  new RT Subclavian central line continue on contact  isolation ,still diarrhea on C-diff antibiotics ENT follow up appreciated , trial of C-PAP as tolerated , , copious secretion from trach. site chest x ray left lower lobe pneumonia , tolerating trch. collar 50% FI02 still excessive secretion need pulmonary toilet , off Ancef antibiotic , no more diarrhea back on full support mechanical ventilator , chest x ray show improvement in LLL air space disease, more awake and responsive on tube feeding no more diarrhea , S/P  Ancef for bacteremia no fever ,ID follow up noted ,  no nausea or vomiting or diarrhea still very weak and tired , event note pulled NG tube now replaced , back on tube feeding ,still on po vancomycin , getting PT and OT at bed side , no plan for decannulation for now. , no more diarrhea receiving PT and OPT at bed side , minimal secretion from tracheostomy site , no SOB getting stronger , improve muscle tone patient transfer to monitor bed still on contact isolation for C-Difficel colitis on 50% FI02, NG tube clogged , and change to resume tube feeding still loose stool . H/H drop significantly require blood transfusion , most likely GI bleeding , IV heparin D/C , vomiting 200 cc of creamy color tube feeding on hold no sob, still melena monitor in the CTU possible capsule endoscopy , H/H is stable ., patient develop TR sided  pneumothorax require chest tube placement , RT IJ central line  placed , develop fever shaking chills , blood culture positive for serratia marcescens , start on cefepime .the patient  become hypoxic and hypotensive placed on full ventilatory support and Vasopressin , levophed and Dobutamine ,S/P blood transfusion on meropenem and vancomycin , IR note appreciated   , on and  off pressors , occasional agitation on Precedex .S/P IR cholecystostomy tube drainage placement in the RT upper Quadrant , resume anticoagulation chest x ray noted C-PAP trail lasted only for 2 hrs , new RT SC line and D/C RT IJ line , RT pig tail cathter has been removed , tolerating C-PAP trial placed on trach. collar 50% FI02 GI consultant noted on NG tube feeding as tolerated , develop AF RVR start on bolus Amiodarone          (2021 16:57)    PAST MEDICAL & SURGICAL HISTORY:  CHF (congestive heart failure)    CAD (coronary artery disease)  Depression    Pleural effusion    History of 2019 novel coronavirus disease (COVID-19)  2020    Hemorrhoids    Bleeding hemorrhoids    Peripheral arterial disease    Claudication    BPH with urinary obstruction    ACC/AHA stage D systolic heart failure  Anticoagulation goal of INR 2.0 to 2.5    Falls    Clavicle fracture    CKD (chronic kidney disease), stage III    Iron deficiency anemia    H/O epistaxis    Vertigo    GI bleed    S/P TVR (tricuspid valve repair)    S/P ventricular assist device    S/P endoscopy    OBJECTIVE:  ICU Vital Signs Last 24 Hrs  T(C): 38.2 (2021 10:00), Max: 38.5 (2021 12:00)  T(F): 100.8 (2021 10:00), Max: 101.3 (2021 12:00)  HR: 65 (2021 10:00) (61 - 69)  BP: --  BP(mean): --  ABP: 105/67 (2021 10:00) (90/54 - 113/64)  ABP(mean): 77 (2021 10:00) (63 - 77)  RR: 20 (2021 10:00) (19 - 35)  SpO2: 99% (2021 10:00) (96% - 100%)       @ 07:01  -   @ 07:00  --------------------------------------------------------  IN: 2693.9 mL / OUT: 1415 mL / NET: 1278.9 mL     @ 07:01  -   @ 10:49  --------------------------------------------------------  IN: 420.8 mL / OUT: 115 mL / NET: 305.8 mL  PHYSICAL EXAM:Daily   Elderly male S/P tracheostomy   on trach-collar  50% FI02      Daily Weight in k.4 (2021 00:00)  HEENT:     + NCAT  + EOMI  - Conjuctival edema   - Icterus   - Thrush   - ETT  + NGT/OGT  Neck:         + FROM  RT SCl line JVD  - Nodes - Masses + Mid-line trachea + Tracheostomy  Chest:            normal A-P diameter    Lungs:          + CTA   + Rhonchi    - Rales    - Wheezing + Decreased  LT BS   - Dullness R L  Cardiac:       + S1 + S2    + RRR   - Irregular   - S3  - S4    - Murmurs   - Rub   - Hamman’s sign   Abdomen:    + BS  + Soft + Non-tender - Distended - Organomegaly - PEG .cholecystostomy tube in place  Extremities:   - Cyanosis U/L   - Clubbing  U/L  + LE/UE Edema   + Capillary refill    + Pulses   Neuro:        - Awake   -  Alert   - Confused   - Lethargic   + Sedated  + Generalized Weakness  Skin:        - Rashes    - Erythema   + Normal incisions   + IV sites intact          HOSPITAL MEDICATIONS: All medications reviewed and analyzed  MEDICATIONS  (STANDING):  amiodarone    Tablet 200 milliGRAM(s) Oral daily  chlorhexidine 0.12% Liquid 15 milliLiter(s) Oral Mucosa every 12 hours  chlorhexidine 2% Cloths 1 Application(s) Topical <User Schedule>  dexmedetomidine Infusion 0.5 MICROgram(s)/kG/Hr (9.81 mL/Hr) IV Continuous <Continuous>  dextrose 50% Injectable 50 milliLiter(s) IV Push every 15 minutes  heparin  Infusion 400 Unit(s)/Hr (12.5 mL/Hr) IV Continuous <Continuous>  Hydromorphone  Injectable 0.5 milliGRAM(s) IV Push once  insulin lispro (ADMELOG) corrective regimen sliding scale   SubCutaneous every 6 hours  pantoprazole  Injectable 40 milliGRAM(s) IV Push every 12 hours  piperacillin/tazobactam IVPB.. 3.375 Gram(s) IV Intermittent every 8 hours  propofol Infusion 20 MICROgram(s)/kG/Min (9.42 mL/Hr) IV Continuous <Continuous>  sodium chloride 0.9% lock flush 3 milliLiter(s) IV Push every 8 hours  sodium chloride 0.9%. 1000 milliLiter(s) (10 mL/Hr) IV Continuous <Continuous>    MEDICATIONS  (PRN):  acetaminophen    Suspension .. 650 milliGRAM(s) Oral every 6 hours PRN Temp greater or equal to 38C (100.4F)    LABS: All Lab data reviewed and analyzed                         8.1    10.66 )-----------( 228      ( 05 Aug 2021 00:53 )             27.0   08-05    134<L>  |  99  |  5<L>  ----------------------------<  116<H>  3.8   |  24  |  0.55    Ca    8.8      05 Aug 2021 00:53  Phos  2.9     08-05  Mg     1.8     08-05    TPro  6.6  /  Alb  2.7<L>  /  TBili  0.5  /  DBili  x   /  AST  25  /  ALT  30  /  AlkPhos  151<H>      Ca    9.0      04 Aug 2021 00:10  Phos  2.8     08-04  Mg     1.7         TPro  6.7  /  Alb  2.9<L>  /  TBili  0.6  /  DBili  x   /  AST  25  /  ALT  40  /  AlkPhos  165<H>      Ca    8.8      03 Aug 2021 01:01  Phos  2.6     08-03  Mg     1.8         TPro  6.5  /  Alb  3.0<L>  /  TBili  0.5  /  DBili  x   /  AST  17  /  ALT  47<H>  /  AlkPhos  171<H>      Ca    8.7      02 Aug 2021 00:41  Phos  2.8     08-02  Mg     1.7         TPro  6.2  /  Alb  2.6<L>  /  TBili  0.5  /  DBili  x   /  AST  15  /  ALT  60<H>  /  AlkPhos  169<H>      Ca    8.8      01 Aug 2021 00:32  Phos  2.9     08-01  Mg     1.8         TPro  6.6  /  Alb  3.0<L>  /  TBili  0.7  /  DBili  x   /  AST  21  /  ALT  93<H>  /  AlkPhos  181<H>      Ca    8.6      2021 00:25  Phos  3.2     -31  Mg     2.0         TPro  6.2  /  Alb  2.7<L>  /  TBili  1.2  /  DBili  x   /  AST  43<H>  /  ALT  131<H>  /  AlkPhos  194<H>      Ca    9.1      2021 00:58  Phos  1.5     07-30  Mg     1.9         TPro  6.4  /  Alb  3.0<L>  /  TBili  1.3<H>  /  DBili  x   /  AST  117<H>  /  ALT  210<H>  /  AlkPhos  224<H>                                                                               PTT - ( 2021 04:52 )  PTT:45.2 sec LIVER FUNCTIONS - ( 2021 00:42 )  Alb: 3.4 g/dL / Pro: 6.7 g/dL / ALK PHOS: 213 U/L / ALT: 15 U/L / AST: 24 U/L / GGT: x           RADIOLOGY: - Reviewed and analyzed RT Pig tail cathter  , LVAD HM2, CT scan of abdomen reviewed result noted

## 2021-08-06 NOTE — DISCHARGE NOTE PROVIDER - CARE PROVIDER_API CALL
Christopher Pierce (MD; PhD)  Adv Heart Fail Trnsplnt Cardio; Cardiovascular Disease; Internal Medicine  [address] [place] [address]  Phone: [phone]  Fax: [phone]  Follow Up Time: Routine    Tamanna Mcclendon (NP; RN)  NP in Adult Health  [address] [place] [address]  Phone: [phone]  Fax: [phone]  Follow Up Time: Routine   Christopher Pierce (MD; PhD)  Adv Heart Fail Trnsplnt Cardio; Cardiovascular Disease; Internal Medicine  [address] [place] [address]  Phone: [phone]  Fax: [phone]  Follow Up Time: Routine    Tmaanna Mcclendon (NP; RN)  NP in Adult Health  [address] [place] [address]  Phone: [phone]  Fax: [phone]  Follow Up Time: Routine    Rosa Maria Templeton)  [facility]  [address] [place] [address]  Phone: [phone]  Fax: [phone]  Follow Up Time: Routine

## 2021-08-06 NOTE — PROGRESS NOTE ADULT - PROBLEM SELECTOR PLAN 3
- serratia bacteremia and poss acalculous cholecystitis  - on cefepmie/flagyl  - d/w transplant ID for duration  - PO vanc for C Diff ppx due to broad spectrum Abx -unlikely related to low cardiac output  -GI input, RUQ sono, consider CT with contrast, stop octreotide

## 2021-08-06 NOTE — DISCHARGE NOTE PROVIDER - NSDCCPCAREPLAN_GEN_ALL_CORE_FT
PRINCIPAL DISCHARGE DIAGNOSIS  Diagnosis: Cholecystostomy care  Assessment and Plan of Treatment: You had a  cholecystostomy drain  placed by Dr. LILI marino on 7/30 in Interventional Radiology (IR).   Monitor site for any sign or symptoms of infection (painful red skin, green/ yellow foul smelling discharge from the insertion site, fever, chills, leakage around drain site).   Flush drain with 5 mL daily. If you meet resistance upon flushing, STOP and contact IR.   Empty drainage bag or bulb daily and record output. Once output is <10mL in a 24hr period or if there is a sudden decrease in output please contact IR to schedule  an appointment.  Drain care:  -Disconnect tubing (tube attached to bag/ bulb)  from the catheter (catheter going into skin)  -Clean catheter with alcohol swab  -Twist on the flush syringe to the catheter (be sure to push the air out of syringe)  -Flush catheter with 5 mL (DO NOT pull back on syringe). If you meet resistance upon flushing, STOP and contact IR.   -Disconnect syringe from catheter and reconnect drainage bag or bulb.  Dressing care:  -Wash hands thoroughly with water and soap for at least 20 seconds.   -take off old dressing and clean around drain site with gauze soaked with warm water  -After cleaning the site, dry and replace dressing with a new gauze and tegaderm.   -Place one piece of gauze underneath the drain and another piece of gauze on top of drain.   -Apply tegaderm (clear dressing) on top.  -Change dressing every 3 days or when soiled  Follow up with surgeon to determine surgical candidacy for gallbladder surgery. If instructed by surgeon follow up in 4-6 weeks for cholecystostomy drain evaluation. Otherwise, follow up every 3 months for routine exchange. Call to make appt at [phone]

## 2021-08-06 NOTE — PROGRESS NOTE ADULT - ASSESSMENT
Assessment and Recommendation:   · Assessment	  Assessment and recommendation :  Recurrent Acute hypoxic respiratory Failure S/P tracheostomy on trach-collar  at 50% FI02  Acute blood loss anemia S/P multiple  blood transfusion   S/P septic shock off vasopressin , levophed and off  Dobutamine   S/P sepsis with serratia marcescens in the blood S/P  cefepime  S/P cholecystostomy tube placement by IR    AF RVR start on bolus Amiodarone   S/P Acute left lower lobe pneumonia   Aspiration pneumonia   Stool is  +ve for C-diff. colitis on PO vancomycin  improved  Non ischemic cardiomyopathy continue ACE inhibitor and B-Blockers   S/P Septic shock and cardiogenic shock   Stage D systolic heart failure S/P LVAD HM2   MH2 LVAD  with  TV Annuloplasty  Severe peripheral vascular disease   severe hyperglycemia on insulin coverage    cardiac arrythmia  on  Amiodarone   critical care polyneuropathy   Anemia of Acute blood Loss   severe protein caloric malnutrition   S/P Small bowel bleeding   S/P blood and FFP transfusion   Chronic kidney disease stage III   NGT feeding on Jevity   GI prophylaxis with PPI

## 2021-08-06 NOTE — DISCHARGE NOTE PROVIDER - NSDCFUADDAPPT_GEN_ALL_CORE_FT
Follow up in LVAD clinic after discharge from Rehab at [facility], call [phone] to confirm appointment.   Follow up with interventional radiology after discharge from Rehab for cholecystomy drain exchange. Call [phone] to schedule an appointment.

## 2021-08-06 NOTE — DISCHARGE NOTE PROVIDER - NSDCMRMEDTOKEN_GEN_ALL_CORE_FT
acetaminophen 325 mg oral tablet: 2 tab(s) orally every 6 hours, As Needed   amiodarone 200 mg oral tablet: 1 tab(s) orally once a day   Colace 100 mg oral capsule: 1 cap(s) orally 2 times a day   cyanocobalamin 1000 mcg oral tablet: 1 tab(s) orally once a day  finasteride 5 mg oral tablet: 1 tab(s) orally once a day  folic acid 1 mg oral tablet: 1 tab(s) orally once a day  gabapentin 100 mg oral capsule: 1 cap(s) orally 2 times a day  Lidoderm 5% topical film: Apply topically to affected area on left side once a day   metoprolol succinate 25 mg oral tablet, extended release: 1 tab(s) orally once a day  MiraLax oral powder for reconstitution: 17 gram(s) orally 2 times a day   mirtazapine 7.5 mg oral tablet: 1 tab(s) orally once a day (at bedtime)  pantoprazole 40 mg oral delayed release tablet: 1 tab(s) orally once a day (before a meal)  Indication: Gi ppx   senna oral tablet: 2 tab(s) orally once a day (at bedtime)  simethicone 80 mg oral tablet, chewable: 1 tab(s) orally 3 times a day  spironolactone 25 mg oral tablet: 1 tab(s) orally once a day  Indication: heart rate  warfarin 3 mg oral tablet: 1 tab(s) orally once a day (at bedtime)  INR goal 2-2.5    aspirin 81 mg oral tablet, chewable: 1 tab(s) orally once a day  chlorhexidine 2% topical pad: 1 application topically   ciprofloxacin 500 mg oral tablet: 1 tab(s) orally every 12 hours  hydrALAZINE 25 mg oral tablet: 1 tab(s) orally every 8 hours  magnesium oxide 400 mg oral tablet: 1 tab(s) orally every 12 hours  methIMAzole 10 mg oral tablet: 1 tab(s) orally once a day  metoclopramide 5 mg/mL injectable solution: 10 milligram(s) injectable every 8 hours  metoprolol succinate 50 mg oral tablet, extended release: 1 tab(s) orally once a day  mirtazapine 7.5 mg oral tablet: 1 tab(s) orally once a day (at bedtime)  pantoprazole 40 mg oral delayed release tablet: 1 tab(s) orally 2 times a day  sertraline 100 mg oral tablet: 1 tab(s) orally once a day  spironolactone 25 mg oral tablet: 1 tab(s) orally once a day  vancomycin 25 mg/mL oral liquid: 125 milligram(s) orally every 12 hours   aspirin 81 mg oral tablet, chewable: 1 tab(s) orally once a day  chlorhexidine 2% topical pad: 1 application topically   ciprofloxacin 500 mg oral tablet: 1 tab(s) orally every 12 hours  hydrALAZINE 25 mg oral tablet: 1 tab(s) orally every 8 hours  magnesium oxide 400 mg oral tablet: 1 tab(s) orally every 12 hours  methIMAzole 10 mg oral tablet: 1 tab(s) orally once a day  metoclopramide 5 mg/5 mL oral syrup: 5 milligram(s) orally 3 times a day (with meals)  metoprolol succinate 50 mg oral tablet, extended release: 1 tab(s) orally once a day  mirtazapine 7.5 mg oral tablet: 1 tab(s) orally once a day (at bedtime)  pantoprazole 40 mg oral delayed release tablet: 1 tab(s) orally 2 times a day  sertraline 100 mg oral tablet: 1 tab(s) orally once a day  spironolactone 25 mg oral tablet: 1 tab(s) orally once a day  vancomycin 25 mg/mL oral liquid: 125 milligram(s) orally every 12 hours

## 2021-08-06 NOTE — PROGRESS NOTE ADULT - SUBJECTIVE AND OBJECTIVE BOX
RICKY HAMPTON  MRN-[id number]  Patient is a 66y old  Male who presents with a chief complaint of Anemia, Supratherapeutic INR, Dark Stools (05 Aug 2021 20:17)    HPI:  65M PMH ACC/AHA stage D HF due to NICM HM2 LVAD , TV annuloplasty ring 17 as destination therapy due to severe peripheral artery disease with significant stenosis  SIADH, Depression, CKD-3 with hyperkalemia, past E. coli UTIs, driveline drainage (21) and COVID-19 (back in 2020)  He was recently seen in clinic where he complained of abdominal pain and dark stools w constipation back in May. He presents to [facility] ER today weakness and fatigue, moderate and + Black stools for three days, on coumadin secondary to warfarin use in the setting of an LVAD. Patient has required transfusions for GIB in the past. Mostly recently back in 2021 pt had anemia with dark stools. No interventions was done at that time. However Last Endoscopy was done in 2020 (negative). Today labs show patient is anemic with H/H of 4.5/16.3,. INR is 8.84 MAP in the 90s, Temp 35.1. He denies any chest pain, shortness of breath, dizziness, abd pain, nausea or vomiting.       (2021 16:57)      Surgery/Hospital Course:   admit for melena w/ anemia, INR 8.84   6/15 Capsul study (+) for small bowel bleed, balloon endoscopy (old blood in prox ileum); post EGD - septic w/ L opacity, re-intubated for concern for aspiration, TTE (Mod MR, decrease biV w/ interventricular septum boweing towards R)   bronch    +C Diff    TC    CT C/A/P: Fluid filled colon which may be 2/2 rapid transit. Small bilateral pleffs with associates. Compressive atelectasis New ISABELLE & LLL  parenchymal opacities, suspicious for pneumonia. 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    #8 Shiley trach at bedside    CPAP trials    LVAD speed increased to 9200   Bronch; Central line dc'ed   TC since , continue as tolerated. Patient transferred to SDU.    Cont PT, no trach downsize today(#8cuffed)/ Anticoagulation/ Continue TF, speech f/u/ Vanco taper    oob to chair  INR  2.47  5 mg coumadin     increased lop to 25 q8  per HF   INR today 2.64.  H&H 7.3/24 this AM.  Will repeat CBC at noon, and will send stool guaiac Patient with persistent abdominal tenderness, rate of tube feeds decreased.  No nausea/vomiting.     INR today 2.4H&H 9.1/.6 low flow overnight /N&V  NPO resolved for capsule study  Coumadin on hold refusing Tube feeds on D5  normal  @50 cc/hr   INR 2.69  H&H 7..1 refusing Tube feeds on D5  normal  @50 cc/hr. This am + BM Anusha Oneill HF  aware- PRBC x1  GI team consulted -  NPO  plan on study in am-  D/w Dr Cadet Patient  to return to CTU for further management; 1PRBCS    Post op INR 2.2 today.  No bleeding. BC + for SM.  Pt is hypotensive requiring pressor and inotropic support.  ID follow up today on Cefepime will follow.   R PTC for PTX    CT C/A/P: sub q emphysema in R chest wall, GGO RUL, small ascites CTH negative; Abd US: GB thickening, pericholecystic fluid     Perchole drain in place continues to drain total output overnight 133.  Fever today 38.8 given tylenol.  Will repeat BC now.     duplex LE negative       Today:    REVIEW OF SYSTEMS:  Unable to obtain secondary to pt being trached    ICU Vital Signs Last 24 Hrs  T(C): 36.4 (06 Aug 2021 04:00), Max: 36.7 (05 Aug 2021 12:00)  T(F): 97.5 (06 Aug 2021 04:00), Max: 98.1 (05 Aug 2021 12:00)  HR: 67 (06 Aug 2021 06:00) (58 - 110)  BP: --  BP(mean): --  ABP: 87/60 (06 Aug 2021 06:00) (65/54 - 112/79)  ABP(mean): 70 (06 Aug 2021 06:00) (59 - 91)  RR: 23 (06 Aug 2021 06:00) (20 - 55)  SpO2: 100% (06 Aug 2021 06:00) (99% - 100%)      Physical Exam:  Gen:  awake and alert    CNS:  intact, nonfocal   Neck: no JVD, +TC   RES : clear , no wheezing              CVS: +LVAD hum   Abd: Soft, +LLQ tender to palpation   Skin: No rash  Ext:  no edema      ============================I/O===========================   I&O's Detail    05 Aug 2021 07:01  -  06 Aug 2021 07:00  --------------------------------------------------------  IN:    Dexmedetomidine: 209.8 mL    Enteral Tube Flush: 35 mL    IV PiggyBack: 50 mL    Miscellaneous Tube Feedin mL    sodium chloride 0.9%: 120 mL  Total IN: 1084.8 mL    OUT:    Drain (mL): 325 mL    Voided (mL): 2175 mL  Total OUT: 2500 mL    Total NET: -1415.2 mL        ============================ LABS =========================                        8.3    8.77  )-----------( 242      ( 06 Aug 2021 01:30 )             27.0     08-06    137  |  102  |  5<L>  ----------------------------<  106<H>  3.8   |  26  |  0.56    Ca    9.5      06 Aug 2021 01:30  Phos  3.0     08-06  Mg     1.8     08-06    TPro  7.2  /  Alb  3.1<L>  /  TBili  0.5  /  DBili  x   /  AST  27  /  ALT  29  /  AlkPhos  150<H>  08-06    LIVER FUNCTIONS - ( 06 Aug 2021 01:30 )  Alb: 3.1 g/dL / Pro: 7.2 g/dL / ALK PHOS: 150 U/L / ALT: 29 U/L / AST: 27 U/L / GGT: x             ABG - ( 06 Aug 2021 01:24 )  pH, Arterial: 7.49  pH, Blood: x     /  pCO2: 38    /  pO2: 172   / HCO3: 29    / Base Excess: 5.7   /  SaO2: 100                 ======================Micro/Rad/Cardio=================  Culture: Reviewed   CXR: Reviewed  Echo:Reviewed  ======================================================  PAST MEDICAL & SURGICAL HISTORY:  CHF (congestive heart failure)    CAD (coronary artery disease)    Depression    Pleural effusion    History of 2019 novel coronavirus disease (COVID-19)  2020    Hemorrhoids    Bleeding hemorrhoids    Peripheral arterial disease    Claudication    BPH with urinary obstruction    ACC/AHA stage D systolic heart failure    Anticoagulation goal of INR 2.0 to 2.5    Falls    Clavicle fracture    CKD (chronic kidney disease), stage III    Iron deficiency anemia    H/O epistaxis    Vertigo    GI bleed    S/P TVR (tricuspid valve repair)    S/P ventricular assist device    S/P endoscopy      ====================ASSESSMENT ==============  67 YO M with a history of stage D NICM s/p HM2 on 2017 as DT (due to severe PAD) with TV ring, prior COVID-19 infection 2020, recurrent syncope post LVAD s/p ILR, and chronic abdominal pain with prior negative workup who was admitted with symptomatic anemia with Hgb 4.5 in setting of INR 8.8. Hospital course c/b cardiogenic shock, hemodynamic instability, acute hypoxemic respiratory failure s/p tracheostomy, GI bleed S/P Enteroscopy , Anemia, in setting of melena, chronic kidney disease, stress hyperglycemia, C.diff positive on , hypovolemic shock, septic shock.     Plan:  ====================== NEUROLOGY=====================  CTH on  with no acute intracranial hemorrhage or acute territorial infarction  OOBTC and ambulate as tolerated      PRN for analgesia   acetaminophen    Suspension .. 650 milliGRAM(s) Oral every 6 hours PRN Mild Pain (1 - 3)    Sleep regimen with   clonazePAM  Tablet 0.5 milliGRAM(s) Oral at bedtime    ==================== RESPIRATORY======================  Acute hypoxemic respiratory failure s/p #8 shiley trach on ; TC vent weaning as tolerated  Requiring intermittent full vent support, continue close monitoring of respiratory rate and breathing pattern, following of ABG’s with A-line monitoring, continuous pulse oximetry monitoring.   CT chest : Sub q emphysema in R chest wall, GGO RUL     Mechanical Ventilation:  Mode: Vent off  Rest on mechanical support over night A/C or CPAP as tolerated    ====================CARDIOVASCULAR==================  Stage D Nonischemic Cardiomyopathy, Status Post HM2 on 2017; LVAD settings 9200 with flow of 4.9  TTE : reveals at least moderate MR, mild AI, severe global LV syst dysfxn, dec RV fxn   Continue invasive hemodynamic monitoring     Rate control with   aMIOdarone    Tablet 200 milliGRAM(s) Oral daily    ===================HEMATOLOGIC/ONC ===================  Anemia due to acute blood in setting of recent upper GI bleed   Continue monitoring daily LDH as pt is on no anticoagulation    Monitor H&H/Plts closely     VTE prophylaxis   heparin   Injectable 5000 Unit(s) SubCutaneous every 8 hours    ===================== RENAL =========================  Continue monitoring urine output, I&OS, BUN/Cr   Replete lytes PRN. Keep K> 4 and Mg >2     ==================== GASTROINTESTINAL===================  CT A/P on : small ascites; ABD US showing GB wall thickening w/ surrounding pericholecystic fluid  S/p cholecystostomy tube placed on : with IR with -60cc of black bile, will monitor site closely.   Tolerating trickle feeds, Jevity 1.2 shantelle @ 10cc/hr   C.diff + on , continue Vanco     Recent emesis on  in setting of abdominal pain, as per GI likely component of illeus given critical illness   If worsening abdominal pain or vomiting, recommended CT A/P to r/o toxic megacolon - for now trail  metoclopramide Injectable 10 milliGRAM(s) IV Push every 8 hours    GI prophylaxis   pantoprazole  Injectable 40 milliGRAM(s) IV Push every 12 hours  octreotide  Injectable 50 MICROGram(s) IV Push every 8 hours    Bowel regimen   polyethylene glycol 3350 17 Gram(s) Oral daily  senna 2 Tablet(s) Oral at bedtime    PRN for nausea/vomiting   ondansetron Injectable 4 milliGRAM(s) IV Push every 6 hours PRN Nausea and/or Vomiting    sodium chloride 0.9%. 1000 milliLiter(s) (5 mL/Hr) IV Continuous <Continuous>    =======================    ENDOCRINE  =====================  Stress hyperglycemia, continue glucose control with   insulin lispro (ADMELOG) corrective regimen sliding scale   SubCutaneous every 6 hours    ========================INFECTIOUS DISEASE================  Afebrile, WBC downtrending 10.66->8.77  Continue trending WBC and monitoring fever curve   SCX on  serratia and stenotro, SCx on  stenotro   Per ID, will consider adding bactrim if clinical condition worsens.     C.diff + on , continue   vancomycin    Solution 125 milliGRAM(s) Oral every 12 hours        Patient requires continuous monitoring with bedside rhythm monitoring, pulse ox monitoring, and intermittent blood gas analysis. Care plan discussed with ICU care team. Patient remained critical and at risk for life threatening decompensation.     By signing my name below, IAgnieszka, attest that this documentation has been prepared under the direction and in the presence of CLAUDIA Gale   Electronically signed: Cesia Shaffer, 21 @ 07:55    I, Aleksandra Olivas, personally performed the services described in this documentation. all medical record entries made by the luisibe were at my direction and in my presence. I have reviewed the chart and agree that the record reflects my personal performance and is accurate and complete  Electronically signed: CLAUDIA Gale        RICKY HAMPTON  MRN-[id number]  Patient is a 66y old  Male who presents with a chief complaint of Anemia, Supratherapeutic INR, Dark Stools (05 Aug 2021 20:17)    HPI:  65M PMH ACC/AHA stage D HF due to NICM HM2 LVAD , TV annuloplasty ring 17 as destination therapy due to severe peripheral artery disease with significant stenosis  SIADH, Depression, CKD-3 with hyperkalemia, past E. coli UTIs, driveline drainage (21) and COVID-19 (back in 2020)  He was recently seen in clinic where he complained of abdominal pain and dark stools w constipation back in May. He presents to [facility] ER today weakness and fatigue, moderate and + Black stools for three days, on coumadin secondary to warfarin use in the setting of an LVAD. Patient has required transfusions for GIB in the past. Mostly recently back in 2021 pt had anemia with dark stools. No interventions was done at that time. However Last Endoscopy was done in 2020 (negative). Today labs show patient is anemic with H/H of 4.5/16.3,. INR is 8.84 MAP in the 90s, Temp 35.1. He denies any chest pain, shortness of breath, dizziness, abd pain, nausea or vomiting.       (2021 16:57)      Surgery/Hospital Course:   admit for melena w/ anemia, INR 8.84   6/15 Capsul study (+) for small bowel bleed, balloon endoscopy (old blood in prox ileum); post EGD - septic w/ L opacity, re-intubated for concern for aspiration, TTE (Mod MR, decrease biV w/ interventricular septum boweing towards R)   bronch    +C Diff    TC    CT C/A/P: Fluid filled colon which may be 2/2 rapid transit. Small bilateral pleffs with associates. Compressive atelectasis New ISABELLE & LLL  parenchymal opacities, suspicious for pneumonia. 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    #8 Shiley trach at bedside    CPAP trials    LVAD speed increased to 9200   Bronch; Central line dc'ed   TC since , continue as tolerated. Patient transferred to SDU.    Cont PT, no trach downsize today(#8cuffed)/ Anticoagulation/ Continue TF, speech f/u/ Vanco taper    oob to chair  INR  2.47  5 mg coumadin     increased lop to 25 q8  per HF   INR today 2.64.  H&H 7.3/24 this AM.  Will repeat CBC at noon, and will send stool guaiac Patient with persistent abdominal tenderness, rate of tube feeds decreased.  No nausea/vomiting.     INR today 2.4H&H 9.1/.6 low flow overnight /N&V  NPO resolved for capsule study  Coumadin on hold refusing Tube feeds on D5  normal  @50 cc/hr   INR 2.69  H&H 7..1 refusing Tube feeds on D5  normal  @50 cc/hr. This am + BM Anusha Oneill HF  aware- PRBC x1  GI team consulted -  NPO  plan on study in am-  D/w Dr Cadet Patient  to return to CTU for further management; 1PRBCS    Post op INR 2.2 today.  No bleeding. BC + for SM.  Pt is hypotensive requiring pressor and inotropic support.  ID follow up today on Cefepime will follow.   R PTC for PTX    CT C/A/P: sub q emphysema in R chest wall, GGO RUL, small ascites CTH negative; Abd US: GB thickening, pericholecystic fluid     Perchole drain in place continues to drain total output overnight 133.  Fever today 38.8 given tylenol.  Will repeat BC now.     duplex LE negative       Today: Discontinuing octreotide secondary to abdominal pain.  Follow up amylase and lipase.  Plan to undergo CT with IV contrast if pain doesn't resolve with octreotide off.    REVIEW OF SYSTEMS:  Unable to obtain secondary to pt being trached    ICU Vital Signs Last 24 Hrs  T(C): 36.4 (06 Aug 2021 04:00), Max: 36.7 (05 Aug 2021 12:00)  T(F): 97.5 (06 Aug 2021 04:00), Max: 98.1 (05 Aug 2021 12:00)  HR: 67 (06 Aug 2021 06:00) (58 - 110)  BP: --  BP(mean): --  ABP: 87/60 (06 Aug 2021 06:00) (65/54 - 112/79)  ABP(mean): 70 (06 Aug 2021 06:00) (59 - 91)  RR: 23 (06 Aug 2021 06:00) (20 - 55)  SpO2: 100% (06 Aug 2021 06:00) (99% - 100%)      Physical Exam:  Gen:  awake and alert    CNS:  intact, nonfocal   Neck: no JVD, +TC   RES : clear , no wheezing              CVS: +LVAD hum   Abd: Soft, +BS, nontender  Skin: No rash  Ext:  no edema      ============================I/O===========================   I&O's Detail    05 Aug 2021 07:01  -  06 Aug 2021 07:00  --------------------------------------------------------  IN:    Dexmedetomidine: 209.8 mL    Enteral Tube Flush: 35 mL    IV PiggyBack: 50 mL    Miscellaneous Tube Feedin mL    sodium chloride 0.9%: 120 mL  Total IN: 1084.8 mL    OUT:    Drain (mL): 325 mL    Voided (mL): 2175 mL  Total OUT: 2500 mL    Total NET: -1415.2 mL        ============================ LABS =========================                        8.3    8.77  )-----------( 242      ( 06 Aug 2021 01:30 )             27.0     08-    137  |  102  |  5<L>  ----------------------------<  106<H>  3.8   |  26  |  0.56    Ca    9.5      06 Aug 2021 01:30  Phos  3.0     08-  Mg     1.8     08-    TPro  7.2  /  Alb  3.1<L>  /  TBili  0.5  /  DBili  x   /  AST  27  /  ALT  29  /  AlkPhos  150<H>  08-    LIVER FUNCTIONS - ( 06 Aug 2021 01:30 )  Alb: 3.1 g/dL / Pro: 7.2 g/dL / ALK PHOS: 150 U/L / ALT: 29 U/L / AST: 27 U/L / GGT: x             ABG - ( 06 Aug 2021 01:24 )  pH, Arterial: 7.49  pH, Blood: x     /  pCO2: 38    /  pO2: 172   / HCO3: 29    / Base Excess: 5.7   /  SaO2: 100                 ======================Micro/Rad/Cardio=================  Culture: Reviewed   CXR: Reviewed  Echo:Reviewed  ======================================================  PAST MEDICAL & SURGICAL HISTORY:  CHF (congestive heart failure)    CAD (coronary artery disease)    Depression    Pleural effusion    History of 2019 novel coronavirus disease (COVID-19)  2020    Hemorrhoids    Bleeding hemorrhoids    Peripheral arterial disease    Claudication    BPH with urinary obstruction    ACC/AHA stage D systolic heart failure    Anticoagulation goal of INR 2.0 to 2.5    Falls    Clavicle fracture    CKD (chronic kidney disease), stage III    Iron deficiency anemia    H/O epistaxis    Vertigo    GI bleed    S/P TVR (tricuspid valve repair)    S/P ventricular assist device    S/P endoscopy      ====================ASSESSMENT ==============  67 YO M with a history of stage D NICM s/p HM2 on 2017 as DT (due to severe PAD) with TV ring, prior COVID-19 infection 2020, recurrent syncope post LVAD s/p ILR, and chronic abdominal pain with prior negative workup who was admitted with symptomatic anemia with Hgb 4.5 in setting of INR 8.8. Hospital course c/b cardiogenic shock, hemodynamic instability, acute hypoxemic respiratory failure s/p tracheostomy, GI bleed S/P Enteroscopy , Anemia, in setting of melena, chronic kidney disease, stress hyperglycemia, C.diff positive on , hypovolemic shock, septic shock.     Plan:  ====================== NEUROLOGY=====================  CTH on  with no acute intracranial hemorrhage or acute territorial infarction  OOBTC and ambulate as tolerated      PRN for analgesia   acetaminophen    Suspension .. 650 milliGRAM(s) Oral every 6 hours PRN Mild Pain (1 - 3)    Sleep regimen with   clonazePAM  Tablet 0.5 milliGRAM(s) Oral at bedtime    ==================== RESPIRATORY======================  Acute hypoxemic respiratory failure s/p #8 shiley trach on ; TC vent weaning as tolerated  Requiring intermittent full vent support, continue close monitoring of respiratory rate and breathing pattern, following of ABG’s with A-line monitoring, continuous pulse oximetry monitoring.   CT chest : Sub q emphysema in R chest wall, GGO RUL     Mechanical Ventilation:  Mode: Vent off  Rest on mechanical support over night A/C or CPAP as tolerated    ====================CARDIOVASCULAR==================  Stage D Nonischemic Cardiomyopathy, Status Post HM2 on 2017; LVAD settings 9200 with flow of 4.9  TTE : reveals at least moderate MR, mild AI, severe global LV syst dysfxn, dec RV fxn   Continue invasive hemodynamic monitoring     Rate control with   aMIOdarone    Tablet 200 milliGRAM(s) Oral daily    ===================HEMATOLOGIC/ONC ===================  Anemia due to acute blood in setting of recent upper GI bleed   Continue monitoring daily LDH as pt is on no anticoagulation    Monitor H&H/Plts closely     VTE prophylaxis   heparin   Injectable 5000 Unit(s) SubCutaneous every 8 hours    ===================== RENAL =========================  Continue monitoring urine output, I&OS, BUN/Cr   Replete lytes PRN. Keep K> 4 and Mg >2     ==================== GASTROINTESTINAL===================  CT A/P on : small ascites; ABD US showing GB wall thickening w/ surrounding pericholecystic fluid  S/p cholecystostomy tube placed on : with IR with -60cc of black bile, will monitor site closely.   Tolerating trickle feeds, Jevity 1.2 shantelle @ 10cc/hr   C.diff + on , continue Vanco     Recent emesis on  in setting of abdominal pain, as per GI likely component of illeus given critical illness   If worsening abdominal pain or vomiting, recommended CT A/P to r/o toxic megacolon - for now trail  metoclopramide Injectable 10 milliGRAM(s) IV Push every 8 hours    GI prophylaxis   pantoprazole  Injectable 40 milliGRAM(s) IV Push every 12 hours  octreotide  Injectable 50 MICROGram(s) IV Push every 8 hours    Bowel regimen   polyethylene glycol 3350 17 Gram(s) Oral daily  senna 2 Tablet(s) Oral at bedtime    PRN for nausea/vomiting   ondansetron Injectable 4 milliGRAM(s) IV Push every 6 hours PRN Nausea and/or Vomiting    sodium chloride 0.9%. 1000 milliLiter(s) (5 mL/Hr) IV Continuous <Continuous>    =======================    ENDOCRINE  =====================  Stress hyperglycemia, continue glucose control with   insulin lispro (ADMELOG) corrective regimen sliding scale   SubCutaneous every 6 hours    ========================INFECTIOUS DISEASE================  Afebrile, WBC downtrending 10.66->8.77  Continue trending WBC and monitoring fever curve   SCX on  serratia and stenotro, SCx on  stenotro   Per ID, will consider adding bactrim if clinical condition worsens.     C.diff + on , continue   vancomycin    Solution 125 milliGRAM(s) Oral every 12 hours        Patient requires continuous monitoring with bedside rhythm monitoring, pulse ox monitoring, and intermittent blood gas analysis. Care plan discussed with ICU care team. Patient remained critical and at risk for life threatening decompensation.     By signing my name below, I, Agnieszka Cherry, attest that this documentation has been prepared under the direction and in the presence of CLAUDIA Gale   Electronically signed: Cesia Shaffer, 21 @ 07:55    I, Aleksandra Olivas, personally performed the services described in this documentation. all medical record entries made by the luisibe were at my direction and in my presence. I have reviewed the chart and agree that the record reflects my personal performance and is accurate and complete  Electronically signed: CLAUDIA Gale

## 2021-08-06 NOTE — PROGRESS NOTE ADULT - PROBLEM SELECTOR PROBLEM 3
Sepsis, due to unspecified organism, unspecified whether acute organ dysfunction present Abdominal pain, unspecified abdominal location

## 2021-08-06 NOTE — PROGRESS NOTE ADULT - PROBLEM SELECTOR PLAN 5
-s/p trach 6/25 - No active bleeding seen on past enteroscopy  - Hgb stable for now, will hold AC/antiplatelets indefinitely  - Continue PPI  - transfuse PRN  - appreciate GI recs

## 2021-08-06 NOTE — DISCHARGE NOTE PROVIDER - DETAILS OF MALNUTRITION DIAGNOSIS/DIAGNOSES
This patient has been assessed with a concern for Malnutrition and was treated during this hospitalization for the following Nutrition diagnosis/diagnoses:     -  07/26/2021: Severe protein-calorie malnutrition   -  06/22/2021: Moderate protein-calorie malnutrition

## 2021-08-06 NOTE — PROGRESS NOTE ADULT - SUBJECTIVE AND OBJECTIVE BOX
INFECTIOUS DISEASE FOLLOW UP NOTE:    Interval History/ROS: Patient is a 66y old  Male who presents with a chief complaint of Anemia, Supratherapeutic INR, Dark Stools (06 Aug 2021 09:58)    Overnight events: Patient have       REVIEW OF SYSTEMS:  CONSTITUTIONAL: No fevers or chills  HEENT: No sore throat  RESPIRATORY: No cough, wheezing, hemoptysis; no shortness of breath  CARDIOVASCULAR: No chest pain or palpitations  GASTROINTESTINAL: Reports abdominal pain; no change in appetite; no early satiety; no nausea, vomiting, or hematemesis; no diarrhea or constipation; no melena or hematochezia; no change of stool caliber  GENITOURINARY: No dysuria, increased in urinary frequency or hematuria  NEUROLOGICAL: Generalized weakness,  No headache/dizziness  MSK: No joint pain, erythema, or swelling; no back pain  SKIN: No itching, rashes; no recent insect bites  All other ROS negative except noted above    Prior hospital charts reviewed [Yes]  Primary team notes reviewed [Yes]  Other consultant notes reviewed [Yes]    Allergies:  No Known Allergies      ANTIMICROBIALS:   vancomycin    Solution 125 every 12 hours      OTHER MEDS: MEDICATIONS  (STANDING):  acetaminophen    Suspension .. 650 every 6 hours PRN  aMIOdarone    Tablet 200 daily  clonazePAM  Tablet 0.5 at bedtime  heparin   Injectable 5000 every 8 hours  insulin lispro (ADMELOG) corrective regimen sliding scale  every 6 hours  metoclopramide Injectable 10 every 8 hours  ondansetron Injectable 4 every 6 hours PRN  pantoprazole  Injectable 40 every 12 hours  polyethylene glycol 3350 17 daily  senna 2 at bedtime      Vital Signs Last 24 Hrs  T(F): 96.8 (08-06-21 @ 08:00), Max: 100.8 (07-30-21 @ 20:00)    Vital Signs Last 24 Hrs  HR: 59 (08-06-21 @ 10:00) (59 - 110)  BP: --  RR: 19 (08-06-21 @ 10:00)  SpO2: 100% (08-06-21 @ 10:00) (99% - 100%)  Wt(kg): --    EXAM:  GENERAL: NAD, lying in bed comfortably, Trach collar  HEAD: Atraumatic, Normocephalic  EYES: EOMI, PERRLA, conjunctiva pink and cornea white  ENT: Normal external ears and nose, no discharges; moist mucous membranes, no erythema on posterior oropharynx; no oral thrush  NECK: Supple, nontender to palpation; no JVD; on trach collar, has copious amount of secretion  CHEST/LUNG: Bilateral rhonchi  HEART: Regular rate and rhythm; LVAD motor is audible  ABDOMEN: Soft, nondistended but diffusely tender to palpation; no rebound tenderness, no guarding; no hepatomegaly; normoactive bowel sounds; drive line dressing clean and intact  EXTREMITIES:  2+ Peripheral Pulses, brisk capillary refill. No clubbing, cyanosis, or petal edema  NERVOUS SYSTEM: Alert and oriented to person, time, place and situation. unable to speak but can mouth words, No focal deficits   MSK: move all four extremities spontaneously, no joint erythema, swelling or pain  SKIN: No rashes or lesions, no superficial thrombophlebitis    Labs:                        8.3    8.77  )-----------( 242      ( 06 Aug 2021 01:30 )             27.0     08-06    137  |  102  |  5<L>  ----------------------------<  106<H>  3.8   |  26  |  0.56    Ca    9.5      06 Aug 2021 01:30  Phos  3.0     08-06  Mg     1.8     08-06    TPro  7.2  /  Alb  3.1<L>  /  TBili  0.5  /  DBili  x   /  AST  27  /  ALT  29  /  AlkPhos  150<H>  08-06      WBC Trend:  WBC Count: 8.77 (08-06-21 @ 01:30)  WBC Count: 10.66 (08-05-21 @ 00:53)  WBC Count: 9.81 (08-04-21 @ 00:10)  WBC Count: 10.92 (08-03-21 @ 01:03)      Creatine Trend:  Creatinine, Serum: 0.56 (08-06)  Creatinine, Serum: 0.55 (08-05)  Creatinine, Serum: 0.56 (08-04)  Creatinine, Serum: 0.56 (08-03)      Liver Biochemical Testing Trend:  Alanine Aminotransferase (ALT/SGPT): 29 (08-06)  Alanine Aminotransferase (ALT/SGPT): 30 (08-05)  Alanine Aminotransferase (ALT/SGPT): 40 (08-04)  Alanine Aminotransferase (ALT/SGPT): 47 *H* (08-03)  Alanine Aminotransferase (ALT/SGPT): 60 *H* (08-02)  Aspartate Aminotransferase (AST/SGOT): 27 (08-06-21 @ 01:30)  Aspartate Aminotransferase (AST/SGOT): 25 (08-05-21 @ 00:53)  Aspartate Aminotransferase (AST/SGOT): 25 (08-04-21 @ 00:10)  Aspartate Aminotransferase (AST/SGOT): 17 (08-03-21 @ 01:01)  Aspartate Aminotransferase (AST/SGOT): 15 (08-02-21 @ 00:41)  Bilirubin Total, Serum: 0.5 (08-06)  Bilirubin Total, Serum: 0.5 (08-05)  Bilirubin Total, Serum: 0.6 (08-04)  Bilirubin Total, Serum: 0.5 (08-03)  Bilirubin Total, Serum: 0.5 (08-02)      Trend LDH  08-06-21 @ 01:30  236  08-05-21 @ 00:53  233  08-04-21 @ 00:10  221  08-03-21 @ 01:01  217  08-02-21 @ 00:41  241          MICROBIOLOGY:    MRSA PCR Result.: NotDetec (06-14-21 @ 23:02)      Culture - Sputum (collected 01 Aug 2021 17:37)  Source: .Sputum Sputum  Final Report:    Moderate Stenotrophomonas maltophilia    Normal Respiratory Bria absent  Organism: Stenotrophomonas maltophilia  Organism: Stenotrophomonas maltophilia    Sensitivities:      -  Ceftazidime: S 4      -  Levofloxacin: S <=0.5      -  Trimethoprim/Sulfamethoxazole: S <=0.5/9.5      Method Type: CLAU    Culture - Blood (collected 01 Aug 2021 17:14)  Source: .Blood Blood-Peripheral  Preliminary Report:    No growth to date.    Culture - Blood (collected 01 Aug 2021 17:14)  Source: .Blood Blood-Peripheral  Preliminary Report:    No growth to date.    Culture - Blood (collected 31 Jul 2021 20:18)  Source: .Blood Blood  Final Report:    No Growth Final    Culture - Blood (collected 31 Jul 2021 20:18)  Source: .Blood Blood  Final Report:    No Growth Final    Culture - Body Fluid with Gram Stain (collected 30 Jul 2021 12:00)  Source: .Body Fluid Bile Fluid  Final Report:    No growth at 5 days    Culture - Blood (collected 29 Jul 2021 03:37)  Source: .Blood Blood-Peripheral  Final Report:    No Growth Final    Culture - Blood (collected 28 Jul 2021 10:19)  Source: .Blood Blood-Venous  Final Report:    No Growth Final    Culture - Blood (collected 28 Jul 2021 10:19)  Source: .Blood Blood-Peripheral  Final Report:    No Growth Final    Culture - Blood (collected 27 Jul 2021 19:10)  Source: .Blood Blood  Final Report:    No Growth Final    HIV-1/2 Combo Result: Nonreact (01-14-21 @ 08:18)    ABS CD4: 129 /uL (04-07-20 @ 08:38)      COVID-19 PCR: NotDetec (06-14-21 @ 12:24)    COVID-19 Le[place] Domain AB Interp: Positive (06-15-21 @ 10:15)  COVID-19 IgG Antibody Interpretation: Positive (01-14-21 @ 08:55)  COVID-19 IgG Antibody Interpretation: Positive (09-08-20 @ 08:50)    Lactate Dehydrogenase, Serum: 236 (08-06)  Lactate Dehydrogenase, Serum: 233 (08-05)  Lactate Dehydrogenase, Serum: 221 (08-04)  Lactate Dehydrogenase, Serum: 217 (08-03)  Lactate Dehydrogenase, Serum: 241 (08-02)  Lactate Dehydrogenase, Serum: 256 (08-01)  Lactate Dehydrogenase, Serum: 295 (07-31)        Blood Gas Arterial, Lactate: 0.5 (08-06 @ 01:24)  Blood Gas Arterial, Lactate: 0.5 (08-05 @ 00:50)  Blood Gas Arterial, Lactate: 0.5 (08-04 @ 11:16)  Blood Gas Arterial, Lactate: 0.5 (08-04 @ 00:00)      RADIOLOGY:  imaging below personally reviewed    < from: Xray Chest 1 View- PORTABLE-Routine (Xray Chest 1 View- PORTABLE-Routine in AM.) (08.06.21 @ 01:41) >  FINDINGS:  The tracheostomy tube overlies the mid trachea.  The enteric tube courses below the diaphragm with the distal tip outside the field-of-view.  Right subclavian central venous catheter terminates at the SVC.  Loop recorder overlies the left chest wall.  Partially imaged LVAD.  Status post median sternotomy and tricuspid valve repair.  The lungs are clear. No pneumothorax.  Stable cardiomediastinal silhouette.    IMPRESSION:    Clear lungs.  Lines and tubes as above.   INFECTIOUS DISEASE FOLLOW UP NOTE:    Interval History/ROS: Patient is a 66y old  Male who presents with a chief complaint of Anemia, Supratherapeutic INR, Dark Stools (06 Aug 2021 09:58)    Overnight events: Patient remains afebrile and hemodynamically stable overnight. Worked with PT today and ambulate out of his room with assistance. Still reports diffused abdominal pain. No shortness of breath, chest pain. On trach collar.      REVIEW OF SYSTEMS:  CONSTITUTIONAL: No fevers or chills  HEENT: No sore throat  RESPIRATORY: No cough, wheezing, hemoptysis; no shortness of breath  CARDIOVASCULAR: No chest pain or palpitations  GASTROINTESTINAL: Reports abdominal pain; no change in appetite; no early satiety; no nausea, vomiting, or hematemesis; no diarrhea or constipation; no melena or hematochezia; no change of stool caliber  GENITOURINARY: No dysuria, increased in urinary frequency or hematuria  NEUROLOGICAL: Generalized weakness,  No headache/dizziness  MSK: No joint pain, erythema, or swelling; no back pain  SKIN: No itching, rashes; no recent insect bites  All other ROS negative except noted above    Prior hospital charts reviewed [Yes]  Primary team notes reviewed [Yes]  Other consultant notes reviewed [Yes]    Allergies:  No Known Allergies      ANTIMICROBIALS:   vancomycin    Solution 125 every 12 hours      OTHER MEDS: MEDICATIONS  (STANDING):  acetaminophen    Suspension .. 650 every 6 hours PRN  aMIOdarone    Tablet 200 daily  clonazePAM  Tablet 0.5 at bedtime  heparin   Injectable 5000 every 8 hours  insulin lispro (ADMELOG) corrective regimen sliding scale  every 6 hours  metoclopramide Injectable 10 every 8 hours  ondansetron Injectable 4 every 6 hours PRN  pantoprazole  Injectable 40 every 12 hours  polyethylene glycol 3350 17 daily  senna 2 at bedtime      Vital Signs Last 24 Hrs  T(F): 96.8 (08-06-21 @ 08:00), Max: 100.8 (07-30-21 @ 20:00)    Vital Signs Last 24 Hrs  HR: 59 (08-06-21 @ 10:00) (59 - 110)  BP: --  RR: 19 (08-06-21 @ 10:00)  SpO2: 100% (08-06-21 @ 10:00) (99% - 100%)  Wt(kg): --    EXAM:  GENERAL: NAD, lying in bed comfortably, Trach collar  HEAD: Atraumatic, Normocephalic  EYES: EOMI, PERRLA, conjunctiva pink and cornea white  ENT: Normal external ears and nose, no discharges; moist mucous membranes, no erythema on posterior oropharynx; no oral thrush  NECK: Supple, nontender to palpation; no JVD; on trach collar, has copious amount of secretion  CHEST/LUNG: Bilateral rhonchi  HEART: Regular rate and rhythm; LVAD motor is audible  ABDOMEN: Soft, nondistended but diffusely tender to palpation; no rebound tenderness, no guarding; no hepatomegaly; normoactive bowel sounds; drive line dressing clean and intact  EXTREMITIES:  2+ Peripheral Pulses, brisk capillary refill. No clubbing, cyanosis, or petal edema  NERVOUS SYSTEM: Alert and oriented to person, time, place and situation. unable to speak but can mouth words and make his concerns known, No focal deficits   MSK: move all four extremities spontaneously, no joint erythema, swelling or pain  SKIN: No rashes or lesions, no superficial thrombophlebitis    Labs:                        8.3    8.77  )-----------( 242      ( 06 Aug 2021 01:30 )             27.0     08-06    137  |  102  |  5<L>  ----------------------------<  106<H>  3.8   |  26  |  0.56    Ca    9.5      06 Aug 2021 01:30  Phos  3.0     08-06  Mg     1.8     08-06    TPro  7.2  /  Alb  3.1<L>  /  TBili  0.5  /  DBili  x   /  AST  27  /  ALT  29  /  AlkPhos  150<H>  08-06      WBC Trend:  WBC Count: 8.77 (08-06-21 @ 01:30)  WBC Count: 10.66 (08-05-21 @ 00:53)  WBC Count: 9.81 (08-04-21 @ 00:10)  WBC Count: 10.92 (08-03-21 @ 01:03)      Creatine Trend:  Creatinine, Serum: 0.56 (08-06)  Creatinine, Serum: 0.55 (08-05)  Creatinine, Serum: 0.56 (08-04)  Creatinine, Serum: 0.56 (08-03)      Liver Biochemical Testing Trend:  Alanine Aminotransferase (ALT/SGPT): 29 (08-06)  Alanine Aminotransferase (ALT/SGPT): 30 (08-05)  Alanine Aminotransferase (ALT/SGPT): 40 (08-04)  Alanine Aminotransferase (ALT/SGPT): 47 *H* (08-03)  Alanine Aminotransferase (ALT/SGPT): 60 *H* (08-02)  Aspartate Aminotransferase (AST/SGOT): 27 (08-06-21 @ 01:30)  Aspartate Aminotransferase (AST/SGOT): 25 (08-05-21 @ 00:53)  Aspartate Aminotransferase (AST/SGOT): 25 (08-04-21 @ 00:10)  Aspartate Aminotransferase (AST/SGOT): 17 (08-03-21 @ 01:01)  Aspartate Aminotransferase (AST/SGOT): 15 (08-02-21 @ 00:41)  Bilirubin Total, Serum: 0.5 (08-06)  Bilirubin Total, Serum: 0.5 (08-05)  Bilirubin Total, Serum: 0.6 (08-04)  Bilirubin Total, Serum: 0.5 (08-03)  Bilirubin Total, Serum: 0.5 (08-02)      Trend LDH  08-06-21 @ 01:30  236  08-05-21 @ 00:53  233  08-04-21 @ 00:10  221  08-03-21 @ 01:01  217  08-02-21 @ 00:41  241          MICROBIOLOGY:    [facility]SA [facility].: [facility] (06-14-21 @ 23:02)      Culture - Sputum (collected 01 Aug 2021 17:37)  Source: .Sputum Sputum  Final Report:    Moderate Stenotrophomonas maltophilia    Normal Respiratory Bria absent  Organism: Stenotrophomonas maltophilia  Organism: Stenotrophomonas maltophilia    Sensitivities:      -  Ceftazidime: S 4      -  Levofloxacin: S <=0.5      -  Trimethoprim/Sulfamethoxazole: S <=0.5/9.5      Method Type: CLAU    Culture - Blood (collected 01 Aug 2021 17:14)  Source: .Blood Blood-Peripheral  Preliminary Report:    No growth to date.    Culture - Blood (collected 01 Aug 2021 17:14)  Source: .Blood Blood-Peripheral  Preliminary Report:    No growth to date.    Culture - Blood (collected 31 Jul 2021 20:18)  Source: .Blood Blood  Final Report:    No Growth Final    Culture - Blood (collected 31 Jul 2021 20:18)  Source: .Blood Blood  Final Report:    No Growth Final    Culture - Body Fluid with Gram Stain (collected 30 Jul 2021 12:00)  Source: .Body Fluid Bile Fluid  Final Report:    No growth at 5 days    Culture - Blood (collected 29 Jul 2021 03:37)  Source: .Blood Blood-Peripheral  Final Report:    No Growth Final    Culture - Blood (collected 28 Jul 2021 10:19)  Source: .Blood Blood-Venous  Final Report:    No Growth Final    Culture - Blood (collected 28 Jul 2021 10:19)  Source: .Blood Blood-Peripheral  Final Report:    No Growth Final    Culture - Blood (collected 27 Jul 2021 19:10)  Source: .Blood Blood  Final Report:    No Growth Final    HIV-1/2 Combo Result: Nonreact (01-14-21 @ 08:18)    ABS CD4: 129 /uL (04-07-20 @ 08:38)      COVID-19 PCR: NotDetec (06-14-21 @ 12:24)    COVID-19 Le[place] Domain AB Interp: Positive (06-15-21 @ 10:15)  COVID-19 IgG Antibody Interpretation: Positive (01-14-21 @ 08:55)  COVID-19 IgG Antibody Interpretation: Positive (09-08-20 @ 08:50)    Lactate Dehydrogenase, Serum: 236 (08-06)  Lactate Dehydrogenase, Serum: 233 (08-05)  Lactate Dehydrogenase, Serum: 221 (08-04)  Lactate Dehydrogenase, Serum: 217 (08-03)  Lactate Dehydrogenase, Serum: 241 (08-02)  Lactate Dehydrogenase, Serum: 256 (08-01)  Lactate Dehydrogenase, Serum: 295 (07-31)        Blood Gas Arterial, Lactate: 0.5 (08-06 @ 01:24)  Blood Gas Arterial, Lactate: 0.5 (08-05 @ 00:50)  Blood Gas Arterial, Lactate: 0.5 (08-04 @ 11:16)  Blood Gas Arterial, Lactate: 0.5 (08-04 @ 00:00)      RADIOLOGY:  imaging below personally reviewed    < from: Xray Chest 1 View- PORTABLE-Routine (Xray Chest 1 View- PORTABLE-Routine in AM.) (08.06.21 @ 01:41) >  FINDINGS:  The tracheostomy tube overlies the mid trachea.  The enteric tube courses below the diaphragm with the distal tip outside the field-of-view.  Right subclavian central venous catheter terminates at the SVC.  Loop recorder overlies the left chest wall.  Partially imaged LVAD.  Status post median sternotomy and tricuspid valve repair.  The lungs are clear. No pneumothorax.  Stable cardiomediastinal silhouette.    IMPRESSION:    Clear lungs.  Lines and tubes as above.

## 2021-08-06 NOTE — DISCHARGE NOTE PROVIDER - HOSPITAL COURSE
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 coli UTIs, driveline drainage (1/7/21) and COVID-19 (back in April 2020)  He was recently seen in clinic where he complained of abdominal pain and dark stools w constipation back in May. He presents to [facility] ER today weakness and fatigue, moderate and + Black stools for three days, on coumadin secondary to warfarin use in the setting of an LVAD.     Surgery/Hospital Course:  6/14 admit for melena w/ anemia, INR 8.84   6/15 Capsul study (+) for small bowel bleed, balloon endoscopy (old blood in prox ileum); post EGD - septic w/ L opacity, re-intubated for concern for aspiration, TTE (Mod MR, decrease biV w/ interventricular septum boweing towards R)  6/17 bronch   6/20 +C Diff   6/22 CT C/A/P: Fluid filled colon which may be 2/2 rapid transit. Small bilateral pleffs with associates. Compressive atelectasis New ISAEBLLE & LLL  parenchymal opacities, suspicious for pneumonia. 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   6/25 #8 Shiley trach at bedside   7/1 LVAD speed increased to 9200  7/2 Bronch  7/20 TC since 7/7. Patient transferred to SDU.   7/23 INR today 2.64.  H&H 7.3/24 this AM.  Will repeat CBC at noon, and will send stool guaiac Patient with persistent abdominal tenderness, rate of tube feeds decreased.  No nausea/vomiting.    7/24 INR today 2.4. H&H 9.1/28.6 low flow overnight /N&V, refusing Tube feeds on D5 1/2 normal  @50 cc/hr  7/25 INR 2.69  H&H 7.7/25.1 refusing Tube feeds on D5 1/2 normal  @50 cc/hr. This am + BM Anusha Oneill HF  aware- PRBC x1  GI team consulted -  NPO  plan on study in am-  D/w Dr Cadet Patient  to return to CTU for further management; 1PRBCS   7/27 Post op INR 2.2 today.  No bleeding. BC + for SM.  Pt is hypotensive requiring pressor and inotropic support.  ID follow up today on Cefepime will follow.  7/26 R PTC for PTX   7/28 CT C/A/P: sub q emphysema in R chest wall, GGO RUL, small ascites CTH negative; Abd US: GB thickening, pericholecystic fluid    7/30 perc choley  7/31 Perchole drain in place continues to drain total output overnight 133.  Fever today 38.8   8/1 duplex LE negative   8/7 Patient with persistent abdominal pain, refusing tube feeds and medications, Psych consulted  8/13 CTA A/P ordered to r/o mesenteric ischemia 2/2 persistent anorexia, nausea, vomiting. Revealed:  Evaluation of the mesenteric vessels is limited by streak artifact from LVAD. There appears to be severe stenosis of the proximal SMA; abdominal mesenteric doppler is recommended for further evaluation. 2.  No small bowel findings to suggest acute mesenteric ischemia. 3.  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  8/15: Cultures resulted BC 1/2 +GPC in clusters, SC enterobacter; mesenteric duplex: borderline stenosis of proximal SMA  8/27: CT C/A/P noncon: Nondular opacities in R lung apex w/cavitation, abd nl  8/31:  Continue current care, treatment of thyrotoxicosis with medications as per endocrine, d/c ABX as per team.   9/8 RUQ sono: Contracted gallbladder with cholecystostomy tube in place.  9/10 failed SMA stent, c/b RP bleed s/p 3U PRCB  9/12 CTA Abd/Pelvis L RP hematoma   9/24 Trach decannulated   9/26 intubated fro resp distress for increased WOB, LL pneumonia; CT C/A/P: progressive ISABELLE opacities and L consolidations, multifocal pneumonia   9/30 TTE (EF 25%, decreased RV, mod MR)   10/3 VT -> Lidocaine gtt   10/4 Trach size 6 DCT cuff  10/5 CT abd (neg for intra-abd abcess, severe stenosis of prox SMA and b/l iliac arteries)  10/18 SMA Stent   10/23 Emend for nausea   10/24 +Occult  10/29 FEES, RUQ ascites and pericholecystic fluid   Thickened liquids puree diet- calorie ct, Today:  Ambulation, TC, and PO diet as tolerated. Day 2 of calorie count Discussions yesterday as to discharge planning, agreeable to go to [facility] rehab however will need to be decannulated and NGT removed.  Patient candidate for SDU.   11/4 Transferred to sdu. Must be oob - chair for all meals. Eventual change to uncuffed trach.  11/5 VSS; rsr/ st ; changed trach to #6 shiley cuffless this am by ENT- tolerated well; continue nutritional support with kaofeed; h/h stable 8/26 today DNR rescinded today as per CHF/ LVAD team   11/6 VSS: pt tolerating supervised po diet; shantelle count in progress; trach care/suctioning q 3 hours; increase hydral 20 q8 as per CHF/LVAD team  11/7   OOB to chair,   vss   keofeed w/ shantelle count    dawn drain    ald 25 qd  added by HF,  no LVAD alarms  11/8 sluggish today, refusing meds.  Plan to hold TF and increase PO intake. If adequate intake plan to d/c ngt  11/9   Cont off TF, calorie count .  11/10 The patient is non compliant today, not stating reasons why Secretions , suction q2-3 Calorie count, remains off TF  11/11 Pending placement at [facility]. Pt was assessed for diet upgrade without PMV in place.   Easy to chew texture diet with Thin liquids via SMALL SINGLE SIPS. Maintain upright posture during/after eating for 30 mins; oral hygiene; position upright (90 degrees); small sips/bites. 100% supervision  11/12 Difficulty placing at [facility] , may  now work towards decannulation, PMV trials at bedside.  11/13 VSS pt ate 100% dinner last evening per nsg staff. will cap during the day as goal is to eventually decannulate pt.  11/14 VSS - SW requesting COVID swab for possible d/c to rehab this week  11/16 vss - accepted to [facility] rehab - await available bed & insurance authorization from [employer].  [facility].

## 2021-08-06 NOTE — PROGRESS NOTE ADULT - ATTENDING COMMENTS
Still with persistent abdominal pain, unable to tolerate increase rate of tube feeds. Unclear etiology. Possibly octreotide. Will stop octreotide, if pain persists, will get CT abdomen with PO/IV contrast. No antiplatelet or anticoagulation. Vent wean per CTU team. Needs physical therapy.

## 2021-08-06 NOTE — PROGRESS NOTE ADULT - ASSESSMENT
66 years old male with a history of stage D NICM s/p HM2 on 9/2017 as DT (due to severe PAD) with TV ring, prior COVID-19 infection 4/2020, recurrent syncope post LVAD s/p ILR, admitted on 6/14/21with symptomatic anemia with Hgb 4.5 in setting of INR 8.8 without hemodynamic instability. He was transfused and underwent VCE which showed active bleeding in the mid small bowel but subsequent enteroscopy 6/15 did not reveal any active bleeding. He acutely decompensated after procedure with fever/hypertension, low flow alarms, and pulmonary infiltrate with hypoxia requiring intubation from probable aspiration PNA. Course notable for inability to wean ventilator with persistent secretions for which he underwent tracheostomy. He developed C. diff colitis and is on vancomycin taper. He became vent dependent and required pressor support. BCx showed serratia on 7/26, which correlates to his sputum culture on 7/7 and 7/27. The source is most likely from pneumonia or cholecystitis. Transaminitis could be secondary to acalculous cholecystitis. Stenotrophomonas is likely colonization and does not need to be treated unless patient decompensated on current treatment or having increased ventilation requirement. Patient is receiving Cefepime and Flagyl for serratia bacteremia for a total of 14 days. He is now on 40% FiO2 with significant clinical improvement.      IMPRESSION:  Resolved septic shock secondary to high grade Serratia bacteremia, secondary to cholecystitis  Tension pneumothorax s/p right chest tube 7/26  Resolved transaminitis, likely acalculous cholecystitis s/p perc dawn tube 7/30  C. diff colitis on PO vancomycin  ICM s/p LVAD  BCx serratia 7/26, NGTD since 7/27  Sputum culture serratia 7/7, serratia and stenotrophomonas 7/27, stenotrophomonas 8/1  Biliary Cx negative (collected after abx started)  CT consolidation on left lower lobe, improved from CT in June 2021  No cholangitis  No signs of drive line infection    R subclavian TLC 8/1      RECOMMENDATIONS:  - Continue with IV cefepime 1gm q8hrs and IV Flagyl 500mg q8hrs for a total of 14 days for GNR bacteremia (ends on 8/9)  - Hold off on treating Stenotrophomonas for now unless he deteriorates on current treatment or with signs of worsening PNA  - Continue PO vancomycin 125mg q12hrs while on board-spectrum antibiotics and stop 48 hours after abx course  - Follow up with surgery for the duration of percutaneous cholecystostomy tube  - D/C TLC if patient is hemodynamically stable  - Aggressive pulmonary toileting  - Will continue to follow  - Guarded prognosis    * THIS IS AN INCOMPLETE NOTE. FINAL RECOMMENDATION WILL BE UPDATED AFTER DISCUSSING WITH ATTENDING *        Marisa Hall DO  PGY4 ID fellow  Pager: [phone]  [facility] pager ID: [phone]  Please contact me via page or text through Microsoft Teams  If after 5PM or on weekends, please call [phone]     66 years old male with a history of stage D NICM s/p HM2 on 9/2017 as DT (due to severe PAD) with TV ring, prior COVID-19 infection 4/2020, recurrent syncope post LVAD s/p ILR, admitted on 6/14/21with symptomatic anemia with Hgb 4.5 in setting of INR 8.8 without hemodynamic instability. He was transfused and underwent VCE which showed active bleeding in the mid small bowel but subsequent enteroscopy 6/15 did not reveal any active bleeding. He acutely decompensated after procedure with fever/hypertension, low flow alarms, and pulmonary infiltrate with hypoxia requiring intubation from probable aspiration PNA. Course notable for inability to wean ventilator with persistent secretions for which he underwent tracheostomy. He developed C. diff colitis and is on vancomycin taper. He became vent dependent and required pressor support. BCx showed serratia on 7/26, which correlates to his sputum culture on 7/7 and 7/27. The source is most likely from pneumonia or cholecystitis. Transaminitis could be secondary to acalculous cholecystitis. Stenotrophomonas is likely colonization and does not need to be treated unless patient decompensated on current treatment or having increased ventilation requirement. Patient is receiving Cefepime and Flagyl for serratia bacteremia for a total of 14 days. He is now on 40% FiO2 trach collar with significant clinical improvement.      IMPRESSION:  Resolved septic shock secondary to high grade Serratia bacteremia, secondary to cholecystitis  Tension pneumothorax s/p right chest tube  Resolved transaminitis, likely acalculous cholecystitis s/p perc dawn tube 7/30  C. diff colitis on PO vancomycin  ICM s/p LVAD  BCx serratia 7/26, NGTD since 7/27  Sputum culture serratia 7/7, serratia and stenotrophomonas 7/27, stenotrophomonas 8/1  Biliary Cx negative (collected after abx started)  CT consolidation on left lower lobe, improved from CT in June 2021  No cholangitis  No signs of drive line infection    R subclavian TLC 8/1      RECOMMENDATIONS:  - Continue with IV cefepime 1gm q8hrs and IV Flagyl 500mg q8hrs for a total of 14 days for GNR bacteremia (ends on 8/9)  - Hold off on treating Stenotrophomonas for now unless he deteriorates on current treatment or with signs of worsening PNA  - Continue PO vancomycin 125mg q12hrs while on board-spectrum antibiotics and stop 48 hours after abx course  - Follow up with surgery for the duration of percutaneous cholecystostomy tube  - D/C TLC if patient is hemodynamically stable  - Aggressive pulmonary toileting  - Will continue to follow  - Guarded prognosis      Case discussed with attending and primary team        Marisa Hall DO  PGY4 ID fellow  Pager: [phone]  [facility] pager ID: [phone]  Please contact me via page or text through Microsoft Teams  If after 5PM or on weekends, please call [phone]

## 2021-08-06 NOTE — DISCHARGE NOTE PROVIDER - NSDCPNSUBOBJ_GEN_ALL_CORE
Vital Signs Last 24 Hrs  T(C): 36.8 (17 Nov 2021 06:00), Max: 36.9 (17 Nov 2021 04:38)  T(F): 98.3 (17 Nov 2021 06:00), Max: 98.4 (17 Nov 2021 04:38)  HR: 104 (17 Nov 2021 10:00) (101 - 110)  BP(mean): 76 (17 Nov 2021 10:00) (74 - 78)  RR: 20 (17 Nov 2021 09:48) (16 - 20)  SpO2: 100% (17 Nov 2021 09:48) (100% - 100%)    PHYSICAL EXAM  Neurology: A&Ox3, NAD  CV : VAD Hum  Lungs: +Trach collar @35% FIO2, Respirations non-labored, B/L BS CTA  Abdomen: Soft, NT/ND, +BSx4Q, +Driveline with DSD  +RLQ dawn drain with bilious drainage  Extremities: B/L LE no edema, negative calf tenderness      45 minutes face to face spent on total encounter, patient counseling and discharge instructions.

## 2021-08-06 NOTE — DISCHARGE NOTE PROVIDER - CARE PROVIDERS DIRECT ADDRESSES
[email or web address]_Unknown,[email or web address]

## 2021-08-06 NOTE — PROGRESS NOTE ADULT - PROBLEM SELECTOR PLAN 4
- No active bleeding seen on past enteroscopy  - Hgb stable for now, will hold AC/antiplatelets indefinitely  - Continue PPI  - transfuse PRN  - appreciate GI recs  - on octreotide 50 IV q8h - serratia bacteremia and poss acalculous cholecystitis  - on cefepmie/flagyl  - d/w transplant ID for duration  - PO vanc for C Diff ppx due to broad spectrum Abx

## 2021-08-06 NOTE — DISCHARGE NOTE PROVIDER - NSDCCPTREATMENT_GEN_ALL_CORE_FT
PRINCIPAL PROCEDURE  Procedure: Cholecystostomy  Findings and Treatment: You had a  cholecystostomy drain  placed by Dr. LILI marino on 7/30 in Interventional Radiology (IR).   Monitor site for any sign or symptoms of infection (painful red skin, green/ yellow foul smelling discharge from the insertion site, fever, chills, leakage around drain site).   Flush drain with 5 mL daily. If you meet resistance upon flushing, STOP and contact IR.   Empty drainage bag or bulb daily and record output. Once output is <10mL in a 24hr period or if there is a sudden decrease in output please contact IR to schedule  an appointment.  Drain care:  -Disconnect tubing (tube attached to bag/ bulb)  from the catheter (catheter going into skin)  -Clean catheter with alcohol swab  -Twist on the flush syringe to the catheter (be sure to push the air out of syringe)  -Flush catheter with 5 mL (DO NOT pull back on syringe). If you meet resistance upon flushing, STOP and contact IR.   -Disconnect syringe from catheter and reconnect drainage bag or bulb.  Dressing care:  -Wash hands thoroughly with water and soap for at least 20 seconds.   -take off old dressing and clean around drain site with gauze soaked with warm water  -After cleaning the site, dry and replace dressing with a new gauze and tegaderm.   -Place one piece of gauze underneath the drain and another piece of gauze on top of drain.   -Apply tegaderm (clear dressing) on top.  -Change dressing every 3 days or when soiled  Follow up with surgeon to determine surgical candidacy for gallbladder surgery. If instructed by surgeon follow up in 4-6 weeks for cholecystostomy drain evaluation. Otherwise, follow up every 3 months for routine exchange. Call to make appt at [phone]

## 2021-08-07 NOTE — PROGRESS NOTE ADULT - SUBJECTIVE AND OBJECTIVE BOX
RICKY JOINT  MRN#: [id number]  Subjective:  Pulmonary progress  : recurrent Acute hypoxic respiratory Failure ,aspiration pneumonia, NICM  , chart reviewed and H/O obtained radiological and Laboratory study reviewed patient Examined     65M PMH ACC/AHA stage D HF due to NICM HM2 LVAD , TV annuloplasty ring 17 as destination therapy due to severe peripheral artery disease with significant stenosis  SIADH, Depression, CKD-3 with hyperkalemia, past E. coli UTIs, driveline drainage (21) and COVID-19 (back in 2020)  He was recently seen in clinic where he complained of abdominal pain and dark stools w constipation back in May. He presents to [facility] ER today weakness and fatigue, moderate and + Black stools for three days, on coumadin secondary to warfarin use in the setting of an LVAD. Patient has required transfusions for GIB in the past. Mostly recently back in 2021 pt had anemia with dark stools. No interventions was done at that time. However Last Endoscopy was done in 2020 (negative). Today labs show patient is anemic with H/H of 4.5/16.3,. INR is 8.84 MAP in the 90s, Temp 35.1. He denies any chest pain, shortness of breath, dizziness, abd pain, nausea or vomiting. found to have  rectal bleeding underwent endoscopy ,old blood in the proximal ileum ,  develop sepsis with LL opacity given Antibiotics , Extubated , reintubated , Bronchoscopy on Zosyn for LL pneumonia  and Amiodarone S/P TV Annuloplasty , patient remain intubated on full ventilatory support .S/P multiple units of blood transfusion , remain on full ventilatory support on Precedex and propofol , new central IJ line , diarrhea C diff. +ve on po vancomycin and IV Flagyl,  mildly distended belly , fever start on cefepime 2gm q 8 hrs S/P tracheostomy .  new RT Subclavian central line continue on contact  isolation ,still diarrhea on C-diff antibiotics ENT follow up appreciated , trial of C-PAP as tolerated , , copious secretion from trach. site chest x ray left lower lobe pneumonia , tolerating trch. collar 50% FI02 still excessive secretion need pulmonary toilet , off Ancef antibiotic , no more diarrhea back on full support mechanical ventilator , chest x ray show improvement in LLL air space disease, more awake and responsive on tube feeding no more diarrhea , S/P  Ancef for bacteremia no fever ,ID follow up noted ,  no nausea or vomiting or diarrhea still very weak and tired , event note pulled NG tube now replaced , back on tube feeding ,still on po vancomycin , getting PT and OT at bed side , no plan for decannulation for now. , no more diarrhea receiving PT and OPT at bed side , minimal secretion from tracheostomy site , no SOB getting stronger , improve muscle tone patient transfer to monitor bed still on contact isolation for C-Difficel colitis on 50% FI02, NG tube clogged , and change to resume tube feeding still loose stool . H/H drop significantly require blood transfusion , most likely GI bleeding , IV heparin D/C , vomiting 200 cc of creamy color tube feeding on hold no sob, still melena monitor in the CTU possible capsule endoscopy , H/H is stable ., patient develop TR sided  pneumothorax require chest tube placement , RT IJ central line  placed , develop fever shaking chills , blood culture positive for serratia marcescens , start on cefepime .the patient  become hypoxic and hypotensive placed on full ventilatory support and Vasopressin , levophed and Dobutamine ,S/P blood transfusion on meropenem and vancomycin , IR note appreciated   , on and  off pressors , occasional agitation on Precedex .S/P IR cholecystostomy tube drainage placement in the RT upper Quadrant , resume anticoagulation chest x ray noted C-PAP trail lasted only for 2 hrs , new RT SC line and D/C RT IJ line , RT pig tail cathter has been removed , tolerating C-PAP trial placed on trach. collar 50% FI02 GI consultant noted on NG tube feeding as tolerated , develop AF RVR S/P  bolus Amiodarone   on D5W IV patient refuse tube feeding          (2021 16:57)    PAST MEDICAL & SURGICAL HISTORY:  CHF (congestive heart failure)    CAD (coronary artery disease)  Depression    Pleural effusion    History of 2019 novel coronavirus disease (COVID-19)  2020    Hemorrhoids    Bleeding hemorrhoids    Peripheral arterial disease    Claudication    BPH with urinary obstruction    ACC/AHA stage D systolic heart failure  Anticoagulation goal of INR 2.0 to 2.5    Falls    Clavicle fracture    CKD (chronic kidney disease), stage III    Iron deficiency anemia    H/O epistaxis    Vertigo    GI bleed    S/P TVR (tricuspid valve repair)    S/P ventricular assist device    S/P endoscopy    OBJECTIVE:  ICU Vital Signs Last 24 Hrs  T(C): 38.2 (2021 10:00), Max: 38.5 (2021 12:00)  T(F): 100.8 (2021 10:00), Max: 101.3 (2021 12:00)  HR: 65 (2021 10:00) (61 - 69)  BP: --  BP(mean): --  ABP: 105/67 (2021 10:00) (90/54 - 113/64)  ABP(mean): 77 (2021 10:00) (63 - 77)  RR: 20 (2021 10:00) (19 - 35)  SpO2: 99% (2021 10:00) (96% - 100%)       @ 07:  -   @ 07:00  --------------------------------------------------------  IN: 2693.9 mL / OUT: 1415 mL / NET: 1278.9 mL     @ 07:  -   @ 10:49  --------------------------------------------------------  IN: 420.8 mL / OUT: 115 mL / NET: 305.8 mL  PHYSICAL EXAM:Daily   Elderly male S/P tracheostomy   on trach-collar  50% FI02      Daily Weight in k.4 (2021 00:00)  HEENT:     + NCAT  + EOMI  - Conjuctival edema   - Icterus   - Thrush   - ETT  + NGT/OGT  Neck:         + FROM  RT SCl line JVD  - Nodes - Masses + Mid-line trachea + Tracheostomy  Chest:            normal A-P diameter    Lungs:          + CTA   + Rhonchi    - Rales    - Wheezing + Decreased  LT BS   - Dullness R L  Cardiac:       + S1 + S2    + RRR   - Irregular   - S3  - S4    - Murmurs   - Rub   - Hamman’s sign   Abdomen:    + BS  + Soft + Non-tender - Distended - Organomegaly - PEG .cholecystostomy tube in place  Extremities:   - Cyanosis U/L   - Clubbing  U/L  + LE/UE Edema   + Capillary refill    + Pulses   Neuro:        - Awake   -  Alert   - Confused   - Lethargic   + Sedated  + Generalized Weakness  Skin:        - Rashes    - Erythema   + Normal incisions   + IV sites intact          HOSPITAL MEDICATIONS: All medications reviewed and analyzed  MEDICATIONS  (STANDING):  amiodarone    Tablet 200 milliGRAM(s) Oral daily  chlorhexidine 0.12% Liquid 15 milliLiter(s) Oral Mucosa every 12 hours  chlorhexidine 2% Cloths 1 Application(s) Topical <User Schedule>  dexmedetomidine Infusion 0.5 MICROgram(s)/kG/Hr (9.81 mL/Hr) IV Continuous <Continuous>  dextrose 50% Injectable 50 milliLiter(s) IV Push every 15 minutes  heparin  Infusion 400 Unit(s)/Hr (12.5 mL/Hr) IV Continuous <Continuous>  Hydromorphone  Injectable 0.5 milliGRAM(s) IV Push once  insulin lispro (ADMELOG) corrective regimen sliding scale   SubCutaneous every 6 hours  pantoprazole  Injectable 40 milliGRAM(s) IV Push every 12 hours  piperacillin/tazobactam IVPB.. 3.375 Gram(s) IV Intermittent every 8 hours  propofol Infusion 20 MICROgram(s)/kG/Min (9.42 mL/Hr) IV Continuous <Continuous>  sodium chloride 0.9% lock flush 3 milliLiter(s) IV Push every 8 hours  sodium chloride 0.9%. 1000 milliLiter(s) (10 mL/Hr) IV Continuous <Continuous>    MEDICATIONS  (PRN):  acetaminophen    Suspension .. 650 milliGRAM(s) Oral every 6 hours PRN Temp greater or equal to 38C (100.4F)    LABS: All Lab data reviewed and analyzed                        8.5    7.57  )-----------( 245      ( 07 Aug 2021 00:23 )             27.4   08-07    138  |  101  |  8   ----------------------------<  88  3.8   |  23  |  0.57    Ca    9.5      07 Aug 2021 00:23  Phos  2.3     08-07  Mg     1.8     08-07    TPro  7.1  /  Alb  3.3  /  TBili  0.5  /  DBili  x   /  AST  22  /  ALT  24  /  AlkPhos  147<H>      Ca    8.8      05 Aug 2021 00:53  Phos  2.9       Mg     1.8         TPro  6.6  /  Alb  2.7<L>  /  TBili  0.5  /  DBili  x   /  AST  25  /  ALT  30  /  AlkPhos  151<H>                                                                                   PTT - ( 2021 04:52 )  PTT:45.2 sec LIVER FUNCTIONS - ( 2021 00:42 )  Alb: 3.4 g/dL / Pro: 6.7 g/dL / ALK PHOS: 213 U/L / ALT: 15 U/L / AST: 24 U/L / GGT: x           RADIOLOGY: - Reviewed and analyzed RT Pig tail cathter  , LVAD HM2, CT scan of abdomen reviewed result noted

## 2021-08-07 NOTE — PROGRESS NOTE ADULT - PROBLEM SELECTOR PLAN 2
- Current speed 9200 RPM  - no alarms  - HOLD coumadin given recurrent bleed. Will hold indefinitely.   - Will plan to hold aspirin indefinitely given recurrent GIB.  - Continue to monitor LDH (201 today) daily as he is off anticoagulation  - appreciate palliative care consult

## 2021-08-07 NOTE — PROVIDER CONTACT NOTE (CHANGE IN STATUS NOTIFICATION) - SITUATION
pt refusing enteral nutrition and intermittently refusing medication therapy, both via NGT and IV. for second day, pt refusing nutrition and medications resulting in high likelihood for decompensation. MD Perry called to bedside to assess patient for second day in a row. Pt unwilling to explain etiology for refusal, unwilling to allow certain physical assessment(s), unwilling to listen to etiology for concern by providers including side effects, potential complications and outcomes of refusal including but not limited to further decompensation and death should refusal continue. pt also states he "wants to live" however when the care team explains measures to support life, he repeatedly denies them and refuses therapies.

## 2021-08-07 NOTE — PROGRESS NOTE ADULT - PROBLEM SELECTOR PLAN 4
- serratia bacteremia and poss acalculous cholecystitis  - currently off IV abx  - PO vanc for C Diff ppx due to broad spectrum Abx

## 2021-08-07 NOTE — PROGRESS NOTE ADULT - ATTENDING COMMENTS
Still with persistent abdominal pain, ultrasound unremarkable. Octreotide stopped. Will treat symptomatically. If pain persists 48-72 hours after stopping Octreotide, will consider CT abdomen with PO/IV contrast. No antiplatelet or anticoagulation. Vent wean per CTU team. Needs physical therapy.

## 2021-08-07 NOTE — PROGRESS NOTE ADULT - ASSESSMENT
Assessment and Recommendation:   · Assessment	  Assessment and recommendation :  Recurrent Acute hypoxic respiratory Failure S/P tracheostomy on trach-collar  at 50% FI02  Acute blood loss anemia S/P multiple  blood transfusion   S/P septic shock off vasopressin , levophed and off  Dobutamine   S/P sepsis with serratia marcescens in the blood S/P  cefepime  S/P cholecystostomy tube placement by IR    AF RVR start on bolus Amiodarone   S/P Acute left lower lobe pneumonia   Aspiration pneumonia   Stool is  +ve for C-diff. colitis on PO vancomycin  improved  Non ischemic cardiomyopathy continue ACE inhibitor and B-Blockers   S/P Septic shock and cardiogenic shock   Stage D systolic heart failure S/P LVAD HM2   MH2 LVAD  with  TV Annuloplasty  Severe peripheral vascular disease   severe hyperglycemia on insulin coverage    cardiac arrythmia  on  Amiodarone   critical care polyneuropathy   Anemia of Acute blood Loss   severe protein caloric malnutrition   S/P Small bowel bleeding   S/P blood and FFP transfusion   Chronic kidney disease stage III   NGT feeding hold patient refuse   GI prophylaxis with PPI

## 2021-08-07 NOTE — PROGRESS NOTE ADULT - PROBLEM SELECTOR PLAN 5
- No active bleeding seen on past enteroscopy  - Hgb stable for now, will hold AC/antiplatelets indefinitely  - Continue PPI  - transfuse PRN  - appreciate GI recs

## 2021-08-07 NOTE — PROGRESS NOTE ADULT - PROBLEM SELECTOR PLAN 3
-unlikely related to low cardiac output  -GI input, RUQ sono 8/6 showed decompressed gallbladder and no focal fluid collection  -octreotide stopped with ongoing pain today  -can consider CT abdomen with PO/IV contrast if persists

## 2021-08-07 NOTE — PROGRESS NOTE ADULT - SUBJECTIVE AND OBJECTIVE BOX
Subjective:  - ongoing abdominal discomfort, primarily near perc dawn tube; otherwise resting comfortably in bed with no complaints.    Medications:  acetaminophen    Suspension .. 650 milliGRAM(s) Oral every 6 hours PRN  aMIOdarone    Tablet 200 milliGRAM(s) Oral daily  chlorhexidine 0.12% Liquid 15 milliLiter(s) Oral Mucosa every 12 hours  chlorhexidine 2% Cloths 1 Application(s) Topical <User Schedule>  clonazePAM  Tablet 0.5 milliGRAM(s) Oral at bedtime  dextrose 5%. 1000 milliLiter(s) IV Continuous <Continuous>  heparin   Injectable 5000 Unit(s) SubCutaneous every 8 hours  insulin lispro (ADMELOG) corrective regimen sliding scale   SubCutaneous every 6 hours  metoclopramide Injectable 10 milliGRAM(s) IV Push every 8 hours  ondansetron Injectable 4 milliGRAM(s) IV Push every 6 hours PRN  pantoprazole  Injectable 40 milliGRAM(s) IV Push every 12 hours  polyethylene glycol 3350 17 Gram(s) Oral daily  senna 2 Tablet(s) Oral at bedtime  sodium chloride 0.9%. 1000 milliLiter(s) IV Continuous <Continuous>  vancomycin    Solution 125 milliGRAM(s) Oral every 12 hours  vasopressin Infusion 0.033 Unit(s)/Min IV Continuous <Continuous>      ICU Vital Signs Last 24 Hrs  T(C): 35.7, Max: 36.6 ( @ 00:00)  HR: 66 (66 - 126)  BP: --  BP(mean): --  ABP: 94/67 (70/53 - 116/78)  ABP(mean): 76 (65 - 92)  RR: 24 (17 - 63)  SpO2: 100% (100% - 100%)    Weight in k.5 (21)  Weight in k.2 (21)      I&O's Summary Last 24 Hrs    IN: 1488.5 mL / OUT: 1510 mL / NET: -21.5 mL      Tele: SR 70s    Physical Exam:    General: No distress. Comfortable.  HEENT: EOM intact.  Neck: JVP not elevated.  Respiratory: Clear to auscultation bilaterally  CV: + LVAD hum.  Abdomen: Soft, non-distended, non-tender  Skin: No skin lesions  Extremities: Warm, no edema  Neurology: Non-focal, alert and oriented times three.   Psych: Affect normal    LVAD interrogation:  Device: HM2  Speed 9200  Flow 5.4  Power 5.6  PI 5.8  Alarms: no PI events      Labs:    ( 21 @ 00:23 )               8.5    7.57  )--------( 245                  27.4     ( 21 @ 00:23 )     138  |  101  |  8   ---------------------<  88  3.8  |  23  |  0.57    Ca 9.5  Phos 2.3  Mg 1.8    ( 21 @ 00:23 )  TPro  7.1  /  Alb  3.3  /  TBili  0.5  /  DBili  x   /  AST  22  /  ALT  24  /  AlkPhos  147  ( 21 @ 01:30 )  TPro  7.2  /  Alb  3.1  /  TBili  0.5  /  DBili  x   /  AST  27  /  ALT  29  /  AlkPhos  150    ABG - ( 21 @ 00:01 )  pH: 7.49  / pCO2: 35    / pO2: 203   / HCO3: 26    / Base Excess: 3.2   / SaO2: 100      Lactate Dehydrogenase, Serum: 201 (21 @ 00:23)  Lactate Dehydrogenase, Serum: 236 (21 @ 01:30)

## 2021-08-07 NOTE — PROGRESS NOTE ADULT - PROBLEM SELECTOR PLAN 1
- Continue home amiodarone 200 mg PO daily  - currently doing well off dobutamine  - would avoid vasopressin and target a MAP of 60-65

## 2021-08-07 NOTE — PROGRESS NOTE ADULT - SUBJECTIVE AND OBJECTIVE BOX
RICKY HAMPTON  MRN-[id number]  Patient is a 66y old  Male who presents with a chief complaint of Anemia, Supratherapeutic INR, Dark Stools (06 Aug 2021 20:33)    HPI:  65M PMH ACC/AHA stage D HF due to NICM HM2 LVAD , TV annuloplasty ring 17 as destination therapy due to severe peripheral artery disease with significant stenosis  SIADH, Depression, CKD-3 with hyperkalemia, past E. coli UTIs, driveline drainage (21) and COVID-19 (back in 2020)  He was recently seen in clinic where he complained of abdominal pain and dark stools w constipation back in May. He presents to [facility] ER today weakness and fatigue, moderate and + Black stools for three days, on coumadin secondary to warfarin use in the setting of an LVAD. Patient has required transfusions for GIB in the past. Mostly recently back in 2021 pt had anemia with dark stools. No interventions was done at that time. However Last Endoscopy was done in 2020 (negative). Today labs show patient is anemic with H/H of 4.5/16.3,. INR is 8.84 MAP in the 90s, Temp 35.1. He denies any chest pain, shortness of breath, dizziness, abd pain, nausea or vomiting.       (2021 16:57)      Surgery/Hospital Course:   admit for melena w/ anemia, INR 8.84   6/15 Capsul study (+) for small bowel bleed, balloon endoscopy (old blood in prox ileum); post EGD - septic w/ L opacity, re-intubated for concern for aspiration, TTE (Mod MR, decrease biV w/ interventricular septum boweing towards R)   bronch    +C Diff    TC    CT C/A/P: Fluid filled colon which may be 2/2 rapid transit. Small bilateral pleffs with associates. Compressive atelectasis New ISABELLE & LLL  parenchymal opacities, suspicious for pneumonia. 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    #8 Shiley trach at bedside    CPAP trials    LVAD speed increased to 9200   Bronch; Central line dc'ed   TC since , continue as tolerated. Patient transferred to SDU.    Cont PT, no trach downsize today(#8cuffed)/ Anticoagulation/ Continue TF, speech f/u/ Vanco taper    oob to chair  INR  2.47  5 mg coumadin     increased lop to 25 q8  per HF   INR today 2.64.  H&H 7.3/24 this AM.  Will repeat CBC at noon, and will send stool guaiac Patient with persistent abdominal tenderness, rate of tube feeds decreased.  No nausea/vomiting.     INR today 2.4H&H 9.1/.6 low flow overnight /N&V  NPO resolved for capsule study  Coumadin on hold refusing Tube feeds on D5  normal  @50 cc/hr   INR 2.69  H&H 7..1 refusing Tube feeds on D5  normal  @50 cc/hr. This am + BM Anusha Oneill HF  aware- PRBC x1  GI team consulted -  NPO  plan on study in am-  D/w Dr Cadet Patient  to return to CTU for further management; 1PRBCS    Post op INR 2.2 today.  No bleeding. BC + for SM.  Pt is hypotensive requiring pressor and inotropic support.  ID follow up today on Cefepime will follow.   R PTC for PTX    CT C/A/P: sub q emphysema in R chest wall, GGO RUL, small ascites CTH negative; Abd US: GB thickening, pericholecystic fluid     Perchole drain in place continues to drain total output overnight 133.  Fever today 38.8 given tylenol.  Will repeat BC now.     duplex LE negative     Today:    REVIEW OF SYSTEMS:  Unable to obtain secondary to pt being trached    ICU Vital Signs Last 24 Hrs  T(C): 35.7 (07 Aug 2021 08:00), Max: 36.6 (07 Aug 2021 00:00)  T(F): 96.3 (07 Aug 2021 08:00), Max: 97.9 (07 Aug 2021 00:00)  HR: 71 (07 Aug 2021 08:00) (59 - 126)  BP: --  BP(mean): --  ABP: 93/64 (07 Aug 2021 08:00) (69/58 - 116/78)  ABP(mean): 74 (07 Aug 2021 08:00) (63 - 92)  RR: 28 (07 Aug 2021 08:00) (17 - 63)  SpO2: 100% (07 Aug 2021 08:00) (100% - 100%)      Physical Exam:  Gen:  awake and alert    CNS:  intact, nonfocal   Neck: no JVD, +TC   RES : clear , no wheezing              CVS: +LVAD hum   Abd: Soft, +BS, nontender  Skin: No rash  Ext:  no edema      ============================I/O===========================   I&O's Detail    06 Aug 2021 07:01  -  07 Aug 2021 07:00  --------------------------------------------------------  IN:    Albumin 5%  - 250 mL: 500 mL    dextrose 5%: 90 mL    IV PiggyBack: 662.5 mL    Miscellaneous Tube Feedin mL    sodium chloride 0.9%: 120 mL    Vasopressin: 6 mL  Total IN: 1488.5 mL    OUT:    Drain (mL): 210 mL    Voided (mL): 1300 mL  Total OUT: 1510 mL    Total NET: -21.5 mL      07 Aug 2021 07:01  -  07 Aug 2021 08:34  --------------------------------------------------------  IN:    dextrose 5%: 15 mL  Total IN: 15 mL    OUT:    Miscellaneous Tube Feedin mL    sodium chloride 0.9%: 0 mL    Vasopressin: 0 mL  Total OUT: 0 mL    Total NET: 15 mL        ============================ LABS =========================                        8.5    7.57  )-----------( 245      ( 07 Aug 2021 00:23 )             27.4     08-07    138  |  101  |  8   ----------------------------<  88  3.8   |  23  |  0.57    Ca    9.5      07 Aug 2021 00:23  Phos  2.3     08-  Mg     1.8     08-07    TPro  7.1  /  Alb  3.3  /  TBili  0.5  /  DBili  x   /  AST  22  /  ALT  24  /  AlkPhos  147<H>  08-07    LIVER FUNCTIONS - ( 07 Aug 2021 00:23 )  Alb: 3.3 g/dL / Pro: 7.1 g/dL / ALK PHOS: 147 U/L / ALT: 24 U/L / AST: 22 U/L / GGT: x             ABG - ( 07 Aug 2021 00:01 )  pH, Arterial: 7.49  pH, Blood: x     /  pCO2: 35    /  pO2: 203   / HCO3: 26    / Base Excess: 3.2   /  SaO2: 100                 ======================Micro/Rad/Cardio=================  Culture: Reviewed   CXR: Reviewed  Echo:Reviewed  ======================================================  PAST MEDICAL & SURGICAL HISTORY:  CHF (congestive heart failure)    CAD (coronary artery disease)    Depression    Pleural effusion    History of 2019 novel coronavirus disease (COVID-19)  2020    Hemorrhoids    Bleeding hemorrhoids    Peripheral arterial disease    Claudication    BPH with urinary obstruction    ACC/AHA stage D systolic heart failure    Anticoagulation goal of INR 2.0 to 2.5    Falls    Clavicle fracture    CKD (chronic kidney disease), stage III    Iron deficiency anemia    H/O epistaxis    Vertigo    GI bleed    S/P TVR (tricuspid valve repair)    S/P ventricular assist device    S/P endoscopy      ====================ASSESSMENT ==============  Stage D Nonischemic Cardiomyopathy, Status Post HM2 on 2017    Cardiogenic shock  Hemodynamic instability   Acute hypoxemic respiratory failure  GI bleed , Status Post Enteroscopy   Anemia, in setting of melena   Leukocytosis   Chronic Kidney Disease  Stress hyperglycemia   C.diff positive on    Hypovolemic shock  Septic shock      Plan:  ====================== NEUROLOGY=====================  CTH on  with no acute intracranial hemorrhage or acute territorial infarction  Tylenol PRN for analgesia   Clonazepam for anxiety     acetaminophen    Suspension .. 650 milliGRAM(s) Oral every 6 hours PRN Mild Pain (1 - 3)  clonazePAM  Tablet 0.5 milliGRAM(s) Oral at bedtime  ==================== RESPIRATORY======================  Acute hypoxemic respiratory failure s/p #8 shiley trach on ; TC vent weaning as tolerated  Requiring intermittent full vent support, continue close monitoring of respiratory rate and breathing pattern, following of ABG’s with A-line monitoring, continuous pulse oximetry monitoring.   CT chest : Sub q emphysema in R chest wall, GGO RUL       ====================CARDIOVASCULAR==================  Stage D Nonischemic Cardiomyopathy, Status Post HM2 on 2017; LVAD settings 9200 with flow of 5.5  TTE : reveals at least moderate MR, mild AI, severe global LV syst dysfxn, dec RV fxn   Continue invasive hemodynamic monitoring   Rate control w/ amiodarone  Pressor support with vasopressin     aMIOdarone    Tablet 200 milliGRAM(s) Oral daily  vasopressin Infusion 0.033 Unit(s)/Min (2 mL/Hr) IV Continuous <Continuous>  ===================HEMATOLOGIC/ONC ===================  Anemia due to acute blood in setting of recent upper GI bleed   Continue monitoring daily LDH   Monitor H&H/Plts closely   Continue Heparin for venous thromboembolism prophylaxis.     heparin   Injectable 5000 Unit(s) SubCutaneous every 8 hours  ===================== RENAL =========================  Continue to monitor I/Os, BUN/Creatinine, and urine output.   Goal net negative fluid balance. Replete lytes PRN. Keep K> 4 and Mg >2.   ==================== GASTROINTESTINAL===================  CT A/P on : small ascites; ABD US showing GB wall thickening w/ surrounding pericholecystic fluid  S/p cholecystostomy tube placed on : with IR with -60cc of black bile, will monitor site closely.   Tolerating trickle feeds, Jevity 1.2 shantelle @ 10cc/hr   C.diff + on , continue Vanco   Bowel regimen with Miralax.   Continue Protonix for stress ulcer prophylaxis.   Reglan for gut motility   Zofran for nausea    dextrose 5%. 1000 milliLiter(s) (15 mL/Hr) IV Continuous <Continuous>  pantoprazole  Injectable 40 milliGRAM(s) IV Push every 12 hours  polyethylene glycol 3350 17 Gram(s) Oral daily  senna 2 Tablet(s) Oral at bedtime  sodium chloride 0.9%. 1000 milliLiter(s) (5 mL/Hr) IV Continuous <Continuous>  metoclopramide Injectable 10 milliGRAM(s) IV Push every 8 hours  ondansetron Injectable 4 milliGRAM(s) IV Push every 6 hours PRN Nausea and/or Vomiting  =======================    ENDOCRINE  =====================  Stress Hyperglycemia, glycemic control with Admelog sliding scale  Monitor blood glucose levels.     insulin lispro (ADMELOG) corrective regimen sliding scale   SubCutaneous every 6 hours  ========================INFECTIOUS DISEASE================  Afebrile, WBC within normal limits  Continue trending WBC and monitoring fever curve   SCX on  serratia and stenotro, SCx on  stenotro   Per ID, will consider adding bactrim if clinical condition worsens.     vancomycin    Solution 125 milliGRAM(s) Oral every 12 hours      Patient requires continuous monitoring with bedside rhythm monitoring, pulse ox monitoring, and intermittent blood gas analysis. Care plan discussed with ICU care team. Patient remained critical and at risk for life threatening decompensation.     By signing my name below, Janina STANLEY Tiffany, attest that this documentation has been prepared under the direction and in the presence of CLAUDIA Goldstein   Electronically signed: Emily Roa Scribe, 21 @ 08:34    Cristóbal STANLEY PA , personally performed the services described in this documentation. all medical record entries made by the luisibmel were at my direction and in my presence. I have reviewed the chart and agree that the record reflects my personal performance and is accurate and complete  Electronically signed: CLAUDIA Goldstein        RICKY HAMPTNO  MRN-[id number]  Patient is a 66y old  Male who presents with a chief complaint of Anemia, Supratherapeutic INR, Dark Stools (06 Aug 2021 20:33)    HPI:  65M PMH ACC/AHA stage D HF due to NICM HM2 LVAD , TV annuloplasty ring 17 as destination therapy due to severe peripheral artery disease with significant stenosis  SIADH, Depression, CKD-3 with hyperkalemia, past E. coli UTIs, driveline drainage (21) and COVID-19 (back in 2020)  He was recently seen in clinic where he complained of abdominal pain and dark stools w constipation back in May. He presents to [facility] ER today weakness and fatigue, moderate and + Black stools for three days, on coumadin secondary to warfarin use in the setting of an LVAD. Patient has required transfusions for GIB in the past. Mostly recently back in 2021 pt had anemia with dark stools. No interventions was done at that time. However Last Endoscopy was done in 2020 (negative). Today labs show patient is anemic with H/H of 4.5/16.3,. INR is 8.84 MAP in the 90s, Temp 35.1. He denies any chest pain, shortness of breath, dizziness, abd pain, nausea or vomiting.       (2021 16:57)      Surgery/Hospital Course:   admit for melena w/ anemia, INR 8.84   6/15 Capsul study (+) for small bowel bleed, balloon endoscopy (old blood in prox ileum); post EGD - septic w/ L opacity, re-intubated for concern for aspiration, TTE (Mod MR, decrease biV w/ interventricular septum boweing towards R)   bronch    +C Diff    TC    CT C/A/P: Fluid filled colon which may be 2/2 rapid transit. Small bilateral pleffs with associates. Compressive atelectasis New ISABELLE & LLL  parenchymal opacities, suspicious for pneumonia. 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    #8 Shiley trach at bedside    CPAP trials    LVAD speed increased to 9200   Bronch; Central line dc'ed   TC since , continue as tolerated. Patient transferred to SDU.    Cont PT, no trach downsize today(#8cuffed)/ Anticoagulation/ Continue TF, speech f/u/ Vanco taper    oob to chair  INR  2.47  5 mg coumadin     increased lop to 25 q8  per HF   INR today 2.64.  H&H 7.3/24 this AM.  Will repeat CBC at noon, and will send stool guaiac Patient with persistent abdominal tenderness, rate of tube feeds decreased.  No nausea/vomiting.     INR today 2.4H&H 9.1.6 low flow overnight /N&V  NPO resolved for capsule study  Coumadin on hold refusing Tube feeds on D5 / normal  @50 cc/hr   INR 2.69  H&H 7..1 refusing Tube feeds on D5 2 normal  @50 cc/hr. This am + BM Anusha Oneill HF  aware- PRBC x1  GI team consulted -  NPO  plan on study in am-  D/w Dr Cadet Patient  to return to CTU for further management; 1PRBCS    Post op INR 2.2 today.  No bleeding. BC + for SM.  Pt is hypotensive requiring pressor and inotropic support.  ID follow up today on Cefepime will follow.   R PTC for PTX    CT C/A/P: sub q emphysema in R chest wall, GGO RUL, small ascites CTH negative; Abd US: GB thickening, pericholecystic fluid     Perchole drain in place continues to drain total output overnight 133.  Fever today 38.8 given tylenol.  Will repeat BC now.     duplex LE negative     Today:  - On/off Precedex for sedation  - Vasopressin drip weaned off  - Patient intermittently refusing tube feeds, D5 drip running  - Only endorses abdominal pain by perc dawn site, no peritoneal signs  - TC since 5A, breathing comfortably, coughing up his own secretions    REVIEW OF SYSTEMS:  Unable to obtain secondary to pt being trached    ICU Vital Signs Last 24 Hrs  T(C): 35.7 (07 Aug 2021 08:00), Max: 36.6 (07 Aug 2021 00:00)  T(F): 96.3 (07 Aug 2021 08:00), Max: 97.9 (07 Aug 2021 00:00)  HR: 71 (07 Aug 2021 08:00) (59 - 126)  BP: --  BP(mean): --  ABP: 93/64 (07 Aug 2021 08:00) (69/58 - 116/78)  ABP(mean): 74 (07 Aug 2021 08:00) (63 - 92)  RR: 28 (07 Aug 2021 08:00) (17 - 63)  SpO2: 100% (07 Aug 2021 08:00) (100% - 100%)      Physical Exam:  Gen:  awake and alert, NAD  CNS:  intact, nonfocal, responds to all commands  Neck: no JVD, +TC   RES : course breath sounds, no wheezing              CVS: +LVAD hum   Abd: Soft, minimally-moderately tender in RUQ by perc dawn site, NT everywhere else, positive BS throughout. Perc dawn drain site c/d/i draining bilious fluid.  Skin: No rash  Ext:  no edema      ============================I/O===========================   I&O's Detail    06 Aug 2021 07:01  -  07 Aug 2021 07:00  --------------------------------------------------------  IN:    Albumin 5%  - 250 mL: 500 mL    dextrose 5%: 90 mL    IV PiggyBack: 662.5 mL    Miscellaneous Tube Feedin mL    sodium chloride 0.9%: 120 mL    Vasopressin: 6 mL  Total IN: 1488.5 mL    OUT:    Drain (mL): 210 mL    Voided (mL): 1300 mL  Total OUT: 1510 mL    Total NET: -21.5 mL      07 Aug 2021 07:01  -  07 Aug 2021 08:34  --------------------------------------------------------  IN:    dextrose 5%: 15 mL  Total IN: 15 mL    OUT:    Miscellaneous Tube Feedin mL    sodium chloride 0.9%: 0 mL    Vasopressin: 0 mL  Total OUT: 0 mL    Total NET: 15 mL        ============================ LABS =========================                        8.5    7.57  )-----------( 245      ( 07 Aug 2021 00:23 )             27.4     08-07    138  |  101  |  8   ----------------------------<  88  3.8   |  23  |  0.57    Ca    9.5      07 Aug 2021 00:23  Phos  2.3     08-  Mg     1.8     08-    TPro  7.1  /  Alb  3.3  /  TBili  0.5  /  DBili  x   /  AST  22  /  ALT  24  /  AlkPhos  147<H>  08-07    LIVER FUNCTIONS - ( 07 Aug 2021 00:23 )  Alb: 3.3 g/dL / Pro: 7.1 g/dL / ALK PHOS: 147 U/L / ALT: 24 U/L / AST: 22 U/L / GGT: x             ABG - ( 07 Aug 2021 00:01 )  pH, Arterial: 7.49  pH, Blood: x     /  pCO2: 35    /  pO2: 203   / HCO3: 26    / Base Excess: 3.2   /  SaO2: 100                 ======================Micro/Rad/Cardio=================  Culture: Reviewed   CXR: Reviewed  Echo:Reviewed  ======================================================  PAST MEDICAL & SURGICAL HISTORY:  CHF (congestive heart failure)    CAD (coronary artery disease)    Depression    Pleural effusion    History of 2019 novel coronavirus disease (COVID-19)  2020    Hemorrhoids    Bleeding hemorrhoids    Peripheral arterial disease    Claudication    BPH with urinary obstruction    ACC/AHA stage D systolic heart failure    Anticoagulation goal of INR 2.0 to 2.5    Falls    Clavicle fracture    CKD (chronic kidney disease), stage III    Iron deficiency anemia    H/O epistaxis    Vertigo    GI bleed    S/P TVR (tricuspid valve repair)    S/P ventricular assist device    S/P endoscopy      ====================ASSESSMENT ==============  Stage D Nonischemic Cardiomyopathy, Status Post HM2 on 2017    Cardiogenic shock  Hemodynamic instability   Acute hypoxemic respiratory failure  GI bleed , Status Post Enteroscopy   Anemia, in setting of melena   Leukocytosis   Chronic Kidney Disease  Stress hyperglycemia   C.diff positive on    Hypovolemic shock  Septic shock      Plan:  ====================== NEUROLOGY=====================  CTH on  with no acute intracranial hemorrhage or acute territorial infarction  Tylenol PRN for analgesia   Clonazepam for anxiety     acetaminophen    Suspension .. 650 milliGRAM(s) Oral every 6 hours PRN Mild Pain (1 - 3)  clonazePAM  Tablet 0.5 milliGRAM(s) Oral at bedtime  ==================== RESPIRATORY======================  Acute hypoxemic respiratory failure s/p #8 shiley trach on ; TC vent weaning as tolerated  Requiring intermittent full vent support, continue close monitoring of respiratory rate and breathing pattern, following of ABG’s with A-line monitoring, continuous pulse oximetry monitoring.   CT chest : Sub q emphysema in R chest wall, GGO RUL       ====================CARDIOVASCULAR==================  Stage D Nonischemic Cardiomyopathy, Status Post HM2 on 2017; LVAD settings 9200 with flow of 5.5  TTE : reveals at least moderate MR, mild AI, severe global LV syst dysfxn, dec RV fxn   Continue invasive hemodynamic monitoring   Rate control w/ amiodarone  Pressor support with vasopressin, weaned off this AM    aMIOdarone    Tablet 200 milliGRAM(s) Oral daily  vasopressin Infusion 0.033 Unit(s)/Min (2 mL/Hr) IV Continuous <Continuous>  ===================HEMATOLOGIC/ONC ===================  Anemia due to acute blood in setting of recent upper GI bleed   Continue monitoring daily LDH   Monitor H&H/Plts closely   Continue Heparin for venous thromboembolism prophylaxis.   No anticoagulation as he has had multiple GI bleeds    heparin   Injectable 5000 Unit(s) SubCutaneous every 8 hours  ===================== RENAL =========================  Continue to monitor I/Os, BUN/Creatinine, and urine output.   Goal net negative fluid balance. Replete lytes PRN. Keep K> 4 and Mg >2.   ==================== GASTROINTESTINAL===================  CT A/P on : small ascites; ABD US showing GB wall thickening w/ surrounding pericholecystic fluid  S/p cholecystostomy tube placed on : with IR with -60cc of black bile, will monitor site closely.   Encourage tube feeds for nutrition  C.diff + on , continue Vanco, plan to d/c   Bowel regimen with Miralax.   Continue Protonix for stress ulcer prophylaxis.   Reglan for gut motility   Zofran for nausea  Serial abdominal exams, if he exam worsens consider CT abdomen/pelvis w/PO and IV contrast    dextrose 5%. 1000 milliLiter(s) (15 mL/Hr) IV Continuous <Continuous>  pantoprazole  Injectable 40 milliGRAM(s) IV Push every 12 hours  polyethylene glycol 3350 17 Gram(s) Oral daily  senna 2 Tablet(s) Oral at bedtime  sodium chloride 0.9%. 1000 milliLiter(s) (5 mL/Hr) IV Continuous <Continuous>  metoclopramide Injectable 10 milliGRAM(s) IV Push every 8 hours  ondansetron Injectable 4 milliGRAM(s) IV Push every 6 hours PRN Nausea and/or Vomiting  =======================    ENDOCRINE  =====================  Stress Hyperglycemia, glycemic control with Admelog sliding scale  Monitor blood glucose levels.     insulin lispro (ADMELOG) corrective regimen sliding scale   SubCutaneous every 6 hours  ========================INFECTIOUS DISEASE================  Afebrile, WBC within normal limits  Continue trending WBC and monitoring fever curve   SCX on  serratia and stenotro, SCx on  stenotro   Per ID, will consider adding bactrim if clinical condition worsens.     vancomycin    Solution 125 milliGRAM(s) Oral every 12 hours      Patient requires continuous monitoring with bedside rhythm monitoring, pulse ox monitoring, and intermittent blood gas analysis. Care plan discussed with ICU care team. Patient remained critical and at risk for life threatening decompensation.     By signing my name below, I, Emily Roa, attest that this documentation has been prepared under the direction and in the presence of CLAUDIA Goldstein   Electronically signed: Eimly Roa Scribe, 21 @ 08:34    ICristóbal PA , personally performed the services described in this documentation. all medical record entries made by the luisibmel were at my direction and in my presence. I have reviewed the chart and agree that the record reflects my personal performance and is accurate and complete  Electronically signed: CLAUDIA Goldstein

## 2021-08-08 NOTE — BH CONSULTATION LIAISON ASSESSMENT NOTE - RISK ASSESSMENT
Static risk factors: male sex, age  Modifiable risk factors: chronic pain, current medical condition  Protective factors: Anabaptist, no known SA hx, not currently depressed.

## 2021-08-08 NOTE — BH CONSULTATION LIAISON ASSESSMENT NOTE - NSBHCHARTREVIEWLAB_PSY_A_CORE FT
8.1    7.04  )-----------( 259      ( 08 Aug 2021 00:48 )             26.0   08-08    136  |  100  |  7   ----------------------------<  87  4.0   |  22  |  0.57    Ca    9.5      08 Aug 2021 00:48  Phos  2.7     08-08  Mg     1.5     08-08    TPro  7.1  /  Alb  3.2<L>  /  TBili  0.5  /  DBili  x   /  AST  19  /  ALT  22  /  AlkPhos  136<H>  08-08

## 2021-08-08 NOTE — BH CONSULTATION LIAISON ASSESSMENT NOTE - CASE SUMMARY
Patient is a 66 year-old male, with no known psychiatry hx, no past inpatient psychiatric hospitalizations, complicated PMH of stage D NICM s/p HM2, recurrent syncope post LVAD, chronic abdominal pain admitted to [facility] on 6/14/21 for symptomatic anemia, with an extensive hospital course including tracheostomy for probable aspiration PNA, recurrent GI bleed, sepsis, and BH CL was consulted for assessment of depression in the context of patient refusing tube feeds. pt is poor historian, eyes closed most of the time, denies si/hi. pt c/o pain, nausea has been refusing feeds. pt couldn't elaborate on his psych meds, details. rec cont current meds for now.

## 2021-08-08 NOTE — BH CONSULTATION LIAISON ASSESSMENT NOTE - NSBHMSESPEECH_PSY_A_CORE
Patient's NG tube limits the patient's volume, but speech is otherwise within normal limits./Normal volume, rate, productivity, spontaneity and articulation

## 2021-08-08 NOTE — CONSULT NOTE ADULT - ASSESSMENT
Assessment  Hyperthyroidism: 66y Male with no history thyroid disease, was not on any thyroid supplements, previously euthyroid (June 2021), now in hyperthyroid state, tachycardic, currently on amiodarone, trached, NPO on tube feeds.  CHF: on medications, stable, monitored.  Anemia: Monitored      Pavan Barone MD  Cell: [phone]  Office: [phone]       Assessment  Hyperthyroidism: 66y Male with no history thyroid disease, was not on any thyroid supplements, previously euthyroid (June 2021), now in hyperthyroid state, tachycardic, currently  on amiodarone, trached, NPO on tube feeds.  CHF: on medications, stable, monitored.  Anemia: Monitored      Pavan Barone MD  Cell: [phone]  Office: [phone]

## 2021-08-08 NOTE — PROGRESS NOTE ADULT - SUBJECTIVE AND OBJECTIVE BOX
Subjective: ongoing abdominal discomfort, refusing tube feeds    Medications:  acetaminophen    Suspension .. 650 milliGRAM(s) Oral every 6 hours PRN  aMIOdarone    Tablet 200 milliGRAM(s) Oral daily  chlorhexidine 0.12% Liquid 15 milliLiter(s) Oral Mucosa every 12 hours  chlorhexidine 2% Cloths 1 Application(s) Topical <User Schedule>  clonazePAM  Tablet 0.5 milliGRAM(s) Oral at bedtime  dextrose 5%. 1000 milliLiter(s) IV Continuous <Continuous>  heparin   Injectable 5000 Unit(s) SubCutaneous every 8 hours  insulin lispro (ADMELOG) corrective regimen sliding scale   SubCutaneous every 6 hours  metoclopramide Injectable 10 milliGRAM(s) IV Push every 8 hours  mirtazapine 7.5 milliGRAM(s) Oral daily  ondansetron Injectable 4 milliGRAM(s) IV Push every 6 hours PRN  pantoprazole  Injectable 40 milliGRAM(s) IV Push every 12 hours  polyethylene glycol 3350 17 Gram(s) Oral daily  senna 2 Tablet(s) Oral at bedtime  vancomycin    Solution 125 milliGRAM(s) Oral every 12 hours      Physical Exam:    Vitals:  Vital Signs Last 24 Hours  T(C): 36.8 (08-08-21 @ 08:00), Max: 36.8 (08-08-21 @ 08:00)  HR: 109 (08-08-21 @ 11:00) (70 - 120)  BP: --  RR: 40 (08-08-21 @ 11:00) (11 - 40)  SpO2: 99% (08-08-21 @ 11:00) (95% - 100%)        I&O's Summary    07 Aug 2021 07:01  -  08 Aug 2021 07:00  --------------------------------------------------------  IN: 535 mL / OUT: 1410 mL / NET: -875 mL    08 Aug 2021 07:01  -  08 Aug 2021 12:13  --------------------------------------------------------  IN: 90 mL / OUT: 200 mL / NET: -110 mL      General: No distress. Comfortable.  HEENT: EOM intact.  Neck: JVP not elevated.  Respiratory: Clear to auscultation bilaterally  CV: + LVAD hum.  Abdomen: Soft, non-distended, non-tender  Skin: No skin lesions  Extremities: Warm, no edema  Neurology: Non-focal, alert and oriented.   Psych: Affect normal    LVAD interrogation:  Device: HM2  Speed 9200  Flow 5.4  Power 5.6  PI 5.8  Alarms: no PI events      Labs:                        8.1    7.04  )-----------( 259      ( 08 Aug 2021 00:48 )             26.0     08-08    136  |  100  |  7   ----------------------------<  87  4.0   |  22  |  0.57    Ca    9.5      08 Aug 2021 00:48  Phos  2.7     08-08  Mg     1.5     08-08    TPro  7.1  /  Alb  3.2<L>  /  TBili  0.5  /  DBili  x   /  AST  19  /  ALT  22  /  AlkPhos  136<H>  08-08    PT/INR - ( 07 Aug 2021 16:03 )   PT: 21.0 sec;   INR: 1.80 ratio                   Lactate Dehydrogenase, Serum: 200 U/L (08-08 @ 00:48)  Lactate Dehydrogenase, Serum: 201 U/L (08-07 @ 00:23)  Lactate Dehydrogenase, Serum: 236 U/L (08-06 @ 01:30)        ( 08-07-21 @ 00:23 )  TPro  7.1  /  Alb  3.3  /  TBili  0.5  /  DBili  x   /  AST  22  /  ALT  24  /  AlkPhos  147  ( 08-06-21 @ 01:30 )  TPro  7.2  /  Alb  3.1  /  TBili  0.5  /  DBili  x   /  AST  27  /  ALT  29  /  AlkPhos  150    ABG - ( 08-07-21 @ 00:01 )  pH: 7.49  / pCO2: 35    / pO2: 203   / HCO3: 26    / Base Excess: 3.2   / SaO2: 100      Lactate Dehydrogenase, Serum: 201 (08-07-21 @ 00:23)  Lactate Dehydrogenase, Serum: 236 (08-06-21 @ 01:30)

## 2021-08-08 NOTE — PROGRESS NOTE ADULT - PROBLEM SELECTOR PLAN 3
-unlikely related to low cardiac output  -GI input, RUQ sono 8/6 showed decompressed gallbladder and no focal fluid collection  -octreotide stopped with ongoing pain today  -can consider CT abdomen with PO/IV contrast if persists  -engage palliative care and psychiatry given patient refusal to start tube feeds

## 2021-08-08 NOTE — BH CONSULTATION LIAISON ASSESSMENT NOTE - NSBHCHARTREVIEWVS_PSY_A_CORE FT
Vital Signs Last 24 Hrs  T(C): 36.8 (08 Aug 2021 08:00), Max: 36.8 (08 Aug 2021 08:00)  T(F): 98.3 (08 Aug 2021 08:00), Max: 98.3 (08 Aug 2021 08:00)  HR: 109 (08 Aug 2021 11:00) (70 - 120)  BP: --  BP(mean): --  RR: 40 (08 Aug 2021 11:00) (11 - 40)  SpO2: 99% (08 Aug 2021 11:00) (95% - 100%)

## 2021-08-08 NOTE — PROGRESS NOTE ADULT - SUBJECTIVE AND OBJECTIVE BOX
RICKY HAMPTON  MRN-[id number]  Patient is a 66y old  Male who presents with a chief complaint of Anemia, Supratherapeutic INR, Dark Stools (06 Aug 2021 20:33)    HPI:  65M PMH ACC/AHA stage D HF due to NICM HM2 LVAD , TV annuloplasty ring 17 as destination therapy due to severe peripheral artery disease with significant stenosis  SIADH, Depression, CKD-3 with hyperkalemia, past E. coli UTIs, driveline drainage (21) and COVID-19 (back in 2020)  He was recently seen in clinic where he complained of abdominal pain and dark stools w constipation back in May. He presents to [facility] ER today weakness and fatigue, moderate and + Black stools for three days, on coumadin secondary to warfarin use in the setting of an LVAD. Patient has required transfusions for GIB in the past. Mostly recently back in 2021 pt had anemia with dark stools. No interventions was done at that time. However Last Endoscopy was done in 2020 (negative). Today labs show patient is anemic with H/H of 4.5/16.3,. INR is 8.84 MAP in the 90s, Temp 35.1. He denies any chest pain, shortness of breath, dizziness, abd pain, nausea or vomiting.       (2021 16:57)      Surgery/Hospital Course:   admit for melena w/ anemia, INR 8.84   6/15 Capsul study (+) for small bowel bleed, balloon endoscopy (old blood in prox ileum); post EGD - septic w/ L opacity, re-intubated for concern for aspiration, TTE (Mod MR, decrease biV w/ interventricular septum boweing towards R)   bronch    +C Diff    TC    CT C/A/P: Fluid filled colon which may be 2/2 rapid transit. Small bilateral pleffs with associates. Compressive atelectasis New ISABELLE & LLL  parenchymal opacities, suspicious for pneumonia. 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    #8 Shiley trach at bedside    CPAP trials    LVAD speed increased to 9200   Bronch; Central line dc'ed   TC since , continue as tolerated. Patient transferred to SDU.    Cont PT, no trach downsize today(#8cuffed)/ Anticoagulation/ Continue TF, speech f/u/ Vanco taper    oob to chair  INR  2.47  5 mg coumadin     increased lop to 25 q8  per HF   INR today 2.64.  H&H 7.3 this AM.  Will repeat CBC at noon, and will send stool guaiac Patient with persistent abdominal tenderness, rate of tube feeds decreased.  No nausea/vomiting.     INR today 2.4H&H 9.1.6 low flow overnight /N&V  NPO resolved for capsule study  Coumadin on hold refusing Tube feeds on D5 / normal  @50 cc/hr   INR 2.69  H&H 7..1 refusing Tube feeds on D5  normal  @50 cc/hr. This am + BM Anusha Oneill HF  aware- PRBC x1  GI team consulted -  NPO  plan on study in am-  D/w Dr Cadet Patient  to return to CTU for further management; 1PRBCS    Post op INR 2.2 today.  No bleeding. BC + for SM.  Pt is hypotensive requiring pressor and inotropic support.  ID follow up today on Cefepime will follow.   R PTC for PTX    CT C/A/P: sub q emphysema in R chest wall, GGO RUL, small ascites CTH negative; Abd US: GB thickening, pericholecystic fluid     Perchole drain in place continues to drain total output overnight 133.  Fever today 38.8 given tylenol.  Will repeat BC now.     duplex LE negative    Patient with persistent abdominal pain, refusing tube feeds and medications, Psych consulted    Today:  - Patient intermittently refusing tube feeds, D5 drip running  - Only endorses abdominal pain by perc dawn site, no peritoneal signs  - TC since 5A, breathing comfortably, coughing up his own secretions    REVIEW OF SYSTEMS:  Unable to obtain secondary to pt being trached    ICU Vital Signs Last 24 Hrs  T(C): 36.5 (08 Aug 2021 12:00), Max: 36.8 (08 Aug 2021 08:00)  T(F): 97.7 (08 Aug 2021 12:00), Max: 98.3 (08 Aug 2021 08:00)  HR: 109 (08 Aug 2021 13:00) (70 - 120)  BP: --  BP(mean): --  ABP: 103/65 (08 Aug 2021 13:00) (82/56 - 107/70)  ABP(mean): 78 (08 Aug 2021 13:00) (65 - 85)  RR: 35 (08 Aug 2021 13:00) (11 - 40)  SpO2: 100% (08 Aug 2021 13:00) (92% - 100%)      Physical Exam:  Gen:  awake and alert, NAD  CNS:  intact, nonfocal, responds to all commands  Neck: no JVD, +TC   RES : course breath sounds, no wheezing              CVS: +LVAD hum   Abd: Soft, minimally-moderately tender in RUQ by perc dawn site, NT everywhere else, positive BS throughout. Perc dawn drain site c/d/i draining bilious fluid.  Skin: No rash  Ext:  no edema    I&O's Detail    07 Aug 2021 07:01  -  08 Aug 2021 07:00  --------------------------------------------------------  IN:    dextrose 5%: 495 mL    Miscellaneous Tube Feedin mL  Total IN: 535 mL    OUT:    Drain (mL): 380 mL    sodium chloride 0.9%: 0 mL    Vasopressin: 0 mL    Voided (mL): 1030 mL  Total OUT: 1410 mL    Total NET: -875 mL      08 Aug 2021 07:01  -  08 Aug 2021 13:47  --------------------------------------------------------  IN:    dextrose 5%: 210 mL  Total IN: 210 mL    OUT:    Drain (mL): 100 mL    Miscellaneous Tube Feedin mL    Voided (mL): 350 mL  Total OUT: 450 mL    Total NET: -240 mL        LABS:                        8.1    7.04  )-----------( 259      ( 08 Aug 2021 00:48 )             26.0     08-08    136  |  100  |  7   ----------------------------<  87  4.0   |  22  |  0.57    Ca    9.5      08 Aug 2021 00:48  Phos  2.7     08-08  Mg     1.5     08-08    TPro  7.1  /  Alb  3.2<L>  /  TBili  0.5  /  DBili  x   /  AST  19  /  ALT  22  /  AlkPhos  136<H>  08-08    PT/INR - ( 07 Aug 2021 16:03 )   PT: 21.0 sec;   INR: 1.80 ratio             CAPILLARY BLOOD GLUCOSE      POCT Blood Glucose.: 83 mg/dL (08 Aug 2021 11:46)        RADIOLOGY & ADDITIONAL TESTS: Reviewed.        ======================Micro/Rad/Cardio=================  Culture: Reviewed   CXR: Reviewed  Echo:Reviewed  ======================================================  PAST MEDICAL & SURGICAL HISTORY:  CHF (congestive heart failure)    CAD (coronary artery disease)    Depression    Pleural effusion    History of 2019 novel coronavirus disease (COVID-19)  2020    Hemorrhoids    Bleeding hemorrhoids    Peripheral arterial disease    Claudication    BPH with urinary obstruction    ACC/AHA stage D systolic heart failure    Anticoagulation goal of INR 2.0 to 2.5    Falls    Clavicle fracture    CKD (chronic kidney disease), stage III    Iron deficiency anemia    H/O epistaxis    Vertigo    GI bleed    S/P TVR (tricuspid valve repair)    S/P ventricular assist device    S/P endoscopy      ====================ASSESSMENT ==============  Stage D Nonischemic Cardiomyopathy, Status Post HM2 on 2017    Cardiogenic shock  Hemodynamic instability   Acute hypoxemic respiratory failure  GI bleed , Status Post Enteroscopy   Anemia, in setting of melena   Leukocytosis   Chronic Kidney Disease  Stress hyperglycemia   C.diff positive on    Hypovolemic shock  Septic shock      Plan:  ====================== NEUROLOGY=====================  CTH on  with no acute intracranial hemorrhage or acute territorial infarction  Tylenol PRN for analgesia   Clonazepam for anxiety     acetaminophen    Suspension .. 650 milliGRAM(s) Oral every 6 hours PRN Mild Pain (1 - 3)  clonazePAM  Tablet 0.5 milliGRAM(s) Oral at bedtime    ==================== RESPIRATORY======================  Acute hypoxemic respiratory failure s/p #8 shiley trach on ; TC vent weaning as tolerated  Requiring intermittent full vent support, continue close monitoring of respiratory rate and breathing pattern, following of ABG’s with A-line monitoring, continuous pulse oximetry monitoring.   CT chest : Sub q emphysema in R chest wall, GGO RUL       ====================CARDIOVASCULAR==================  Stage D Nonischemic Cardiomyopathy, Status Post HM2 on 2017; LVAD settings 9200 with flow of 5.5  TTE : reveals at least moderate MR, mild AI, severe global LV syst dysfxn, dec RV fxn   Continue invasive hemodynamic monitoring   Rate control w/ amiodarone  Pressor support with vasopressin, weaned off this AM    aMIOdarone    Tablet 200 milliGRAM(s) Oral daily  vasopressin Infusion 0.033 Unit(s)/Min (2 mL/Hr) IV Continuous <Continuous>  ===================HEMATOLOGIC/ONC ===================  Anemia due to acute blood in setting of recent upper GI bleed   Continue monitoring daily LDH   Monitor H&H/Plts closely   Continue Heparin for venous thromboembolism prophylaxis.   No anticoagulation as he has had multiple GI bleeds    heparin   Injectable 5000 Unit(s) SubCutaneous every 8 hours  ===================== RENAL =========================  Continue to monitor I/Os, BUN/Creatinine, and urine output.   Goal net negative fluid balance. Replete lytes PRN. Keep K> 4 and Mg >2.     ==================== GASTROINTESTINAL===================  CT A/P on : small ascites; ABD US showing GB wall thickening w/ surrounding pericholecystic fluid  S/p cholecystostomy tube placed on : with IR with -60cc of black bile, will monitor site closely.   Encourage tube feeds for nutrition  C.diff + on , continue Vanco, plan to d/c   Bowel regimen with Miralax.   Continue Protonix for stress ulcer prophylaxis.   Reglan for gut motility   Zofran for nausea  Serial abdominal exams, if he exam worsens consider CT abdomen/pelvis w/PO and IV contrast    dextrose 5%. 1000 milliLiter(s) (15 mL/Hr) IV Continuous <Continuous>  pantoprazole  Injectable 40 milliGRAM(s) IV Push every 12 hours  polyethylene glycol 3350 17 Gram(s) Oral daily  senna 2 Tablet(s) Oral at bedtime  sodium chloride 0.9%. 1000 milliLiter(s) (5 mL/Hr) IV Continuous <Continuous>  metoclopramide Injectable 10 milliGRAM(s) IV Push every 8 hours  ondansetron Injectable 4 milliGRAM(s) IV Push every 6 hours PRN Nausea and/or Vomiting    =======================    ENDOCRINE  =====================  Stress Hyperglycemia, glycemic control with Admelog sliding scale  Monitor blood glucose levels.     insulin lispro (ADMELOG) corrective regimen sliding scale   SubCutaneous every 6 hours  ========================INFECTIOUS DISEASE================  Afebrile, WBC within normal limits  Continue trending WBC and monitoring fever curve   SCX on  serratia and stenotro, SCx on  stenotro   Per ID, will consider adding bactrim if clinical condition worsens.     vancomycin    Solution 125 milliGRAM(s) Oral every 12 hours      Patient requires continuous monitoring with bedside rhythm monitoring, pulse ox monitoring, and intermittent blood gas analysis. Care plan discussed with ICU care team. Patient remained critical and at risk for life threatening decompensation.

## 2021-08-08 NOTE — BH CONSULTATION LIAISON ASSESSMENT NOTE - SUMMARY
Patient is a 66 year-old male, with no known psychiatry hx, no past inpatient psychiatric hospitalizations, complicated PMH of stage D NICM s/p HM2, recurrent syncope post LVAD, chronic abdominal pain admitted to [facility] on 6/14/21 for symptomatic anemia, with an extensive hospital course including tracheostomy for probable aspiration PNA, recurrent GI bleed, sepsis, and BH CL was consulted for assessment of depression in the context of patient refusing tube feeds. The patient does not currently meet criteria for MDD. The patient's behavior could be explained by adjustment d/o in the context of his complex medical picture.    Can continue with the patient's Remeron. The patient would benefit from a GOC discussion with Palliative Care.

## 2021-08-08 NOTE — BH CONSULTATION LIAISON ASSESSMENT NOTE - CURRENT MEDICATION
MEDICATIONS  (STANDING):  aMIOdarone    Tablet 200 milliGRAM(s) Oral daily  chlorhexidine 0.12% Liquid 15 milliLiter(s) Oral Mucosa every 12 hours  chlorhexidine 2% Cloths 1 Application(s) Topical <User Schedule>  clonazePAM  Tablet 0.5 milliGRAM(s) Oral at bedtime  dextrose 5%. 1000 milliLiter(s) (25 mL/Hr) IV Continuous <Continuous>  heparin   Injectable 5000 Unit(s) SubCutaneous every 8 hours  insulin lispro (ADMELOG) corrective regimen sliding scale   SubCutaneous every 6 hours  metoclopramide Injectable 10 milliGRAM(s) IV Push every 8 hours  mirtazapine 7.5 milliGRAM(s) Oral daily  pantoprazole  Injectable 40 milliGRAM(s) IV Push every 12 hours  polyethylene glycol 3350 17 Gram(s) Oral daily  senna 2 Tablet(s) Oral at bedtime  vancomycin    Solution 125 milliGRAM(s) Oral every 12 hours    MEDICATIONS  (PRN):  acetaminophen    Suspension .. 650 milliGRAM(s) Oral every 6 hours PRN Mild Pain (1 - 3)  ondansetron Injectable 4 milliGRAM(s) IV Push every 6 hours PRN Nausea and/or Vomiting

## 2021-08-08 NOTE — BH CONSULTATION LIAISON ASSESSMENT NOTE - HPI (INCLUDE ILLNESS QUALITY, SEVERITY, DURATION, TIMING, CONTEXT, MODIFYING FACTORS, ASSOCIATED SIGNS AND SYMPTOMS)
Patient is a 66 year-old male, with no known psychiatry hx, no past inpatient psychiatric hospitalizations, complicated PMH of stage D NICM s/p HM2, recurrent syncope post LVAD, chronic abdominal pain admitted to [facility] on 6/14/21 for symptomatic anemia, with an extensive hospital course including tracheostomy for probable aspiration PNA, recurrent GI bleed, sepsis, and BH CL was consulted for assessment of depression in the context of patient refusing tube feeds. Patient was cooperative during the interview. Regarding his refusal of tube feeds, the patient endorsed pain, nausea and emesis with every feed, which is his reasons for refusal. He expressed frustration regarding lack of clarity about his current situation. Patient denied depressive symptoms. The patient remarked "when God determines it's my time, I will go." Patient denied passive SI/active SI with intent/plan as well as HI, A/V hallucinations.

## 2021-08-08 NOTE — BH CONSULTATION LIAISON ASSESSMENT NOTE - NSBHCONSULTRECOMMENDOTHER_PSY_A_CORE FT
cont remeron, klonopin for now. if mental status worsens, may consider d/c klonopin  change remeron to qhs, cont klonopin for now. if mental status worsens, may consider d/c klonopin

## 2021-08-09 NOTE — PROGRESS NOTE ADULT - SUBJECTIVE AND OBJECTIVE BOX
RICKY HAMPTON  MRN-[id number]  Patient is a 66y old  Male who presents with a chief complaint of Anemia, Supratherapeutic INR, Dark Stools (08 Aug 2021 16:09)    HPI:  65M PMH ACC/AHA stage D HF due to NICM HM2 LVAD , TV annuloplasty ring 17 as destination therapy due to severe peripheral artery disease with significant stenosis  SIADH, Depression, CKD-3 with hyperkalemia, past E. coli UTIs, driveline drainage (21) and COVID-19 (back in 2020)  He was recently seen in clinic where he complained of abdominal pain and dark stools w constipation back in May. He presents to [facility] ER today weakness and fatigue, moderate and + Black stools for three days, on coumadin secondary to warfarin use in the setting of an LVAD. Patient has required transfusions for GIB in the past. Mostly recently back in 2021 pt had anemia with dark stools. No interventions was done at that time. However Last Endoscopy was done in 2020 (negative). Today labs show patient is anemic with H/H of 4.5/16.3,. INR is 8.84 MAP in the 90s, Temp 35.1. He denies any chest pain, shortness of breath, dizziness, abd pain, nausea or vomiting.       (2021 16:57)      Surgery/Hospital Course:   admit for melena w/ anemia, INR 8.84   6/15 Capsul study (+) for small bowel bleed, balloon endoscopy (old blood in prox ileum); post EGD - septic w/ L opacity, re-intubated for concern for aspiration, TTE (Mod MR, decrease biV w/ interventricular septum boweing towards R)   bronch    +C Diff    TC    CT C/A/P: Fluid filled colon which may be 2/2 rapid transit. Small bilateral pleffs with associates. Compressive atelectasis New ISABELLE & LLL  parenchymal opacities, suspicious for pneumonia. 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    #8 Shiley trach at bedside    CPAP trials    LVAD speed increased to 9200   Bronch; Central line dc'ed   TC since , continue as tolerated. Patient transferred to SDU.    Cont PT, no trach downsize today(#8cuffed)/ Anticoagulation/ Continue TF, speech f/u/ Vanco taper    oob to chair  INR  2.47  5 mg coumadin     increased lop to 25 q8  per HF   INR today 2.64.  H&H 7.3 this AM.  Will repeat CBC at noon, and will send stool guaiac Patient with persistent abdominal tenderness, rate of tube feeds decreased.  No nausea/vomiting.     INR today 2.4H&H 9.1/.6 low flow overnight /N&V  NPO resolved for capsule study  Coumadin on hold refusing Tube feeds on D5  normal  @50 cc/hr   INR 2.69  H&H 7..1 refusing Tube feeds on D5  normal  @50 cc/hr. This am + BM Anusha Oneill HF  aware- PRBC x1  GI team consulted -  NPO  plan on study in am-  D/w Dr Cadet Patient  to return to CTU for further management; 1PRBCS    Post op INR 2.2 today.  No bleeding. BC + for SM.  Pt is hypotensive requiring pressor and inotropic support.  ID follow up today on Cefepime will follow.   R PTC for PTX    CT C/A/P: sub q emphysema in R chest wall, GGO RUL, small ascites CTH negative; Abd US: GB thickening, pericholecystic fluid     Perchole drain in place continues to drain total output overnight 133.  Fever today 38.8 given tylenol.  Will repeat BC now.     duplex LE negative    Patient with persistent abdominal pain, refusing tube feeds and medications, Psych consulted    Today:    REVIEW OF SYSTEMS:  Unable to obtain secondary to pt being trached      ICU Vital Signs Last 24 Hrs  T(C): 36.7 (09 Aug 2021 04:00), Max: 36.8 (08 Aug 2021 08:00)  T(F): 98.1 (09 Aug 2021 04:00), Max: 98.3 (08 Aug 2021 08:00)  HR: 111 (09 Aug 2021 07:00) (105 - 119)  BP: --  BP(mean): --  ABP: 96/71 (09 Aug 2021 07:00) (79/51 - 104/67)  ABP(mean): 81 (09 Aug 2021 07:00) (61 - 82)  RR: 33 (09 Aug 2021 07:00) (0 - 40)  SpO2: 99% (09 Aug 2021 07:00) (92% - 100%)      Physical Exam:  Gen:  awake and alert, NAD  CNS:  intact, nonfocal, responds to all commands  Neck: no JVD, +TC   RES : course breath sounds, no wheezing              CVS: +LVAD hum   Abd: Soft, minimally-moderately tender in RUQ by perc dawn site, NT everywhere else, positive BS throughout. Perc dawn drain site c/d/i draining bilious fluid.  Skin: No rash  Ext:  no edema      ============================I/O===========================   I&O's Detail    08 Aug 2021 07:01  -  09 Aug 2021 07:00  --------------------------------------------------------  IN:    dextrose 5%: 720 mL    Enteral Tube Flush: 20 mL  Total IN: 740 mL    OUT:    Drain (mL): 360 mL    Miscellaneous Tube Feedin mL    Voided (mL): 850 mL  Total OUT: 1210 mL    Total NET: -470 mL        ============================ LABS =========================                        8.2    11.31 )-----------( 289      ( 09 Aug 2021 00:33 )             26.8     -    134<L>  |  97  |  6<L>  ----------------------------<  83  3.8   |  24  |  0.56    Ca    9.1      09 Aug 2021 00:33  Phos  3.3       Mg     1.5         TPro  7.7  /  Alb  3.6  /  TBili  0.6  /  DBili  x   /  AST  25  /  ALT  24  /  AlkPhos  137<H>      LIVER FUNCTIONS - ( 09 Aug 2021 00:33 )  Alb: 3.6 g/dL / Pro: 7.7 g/dL / ALK PHOS: 137 U/L / ALT: 24 U/L / AST: 25 U/L / GGT: x           PT/INR - ( 07 Aug 2021 16:03 )   PT: 21.0 sec;   INR: 1.80 ratio           ABG - ( 08 Aug 2021 23:55 )  pH, Arterial: 7.41  pH, Blood: x     /  pCO2: 44    /  pO2: 111   / HCO3: 28    / Base Excess: 3.0   /  SaO2: 99                  ======================Micro/Rad/Cardio=================  Culture: Reviewed   CXR: Reviewed  Echo:Reviewed  ======================================================  PAST MEDICAL & SURGICAL HISTORY:  CHF (congestive heart failure)    CAD (coronary artery disease)    Depression    Pleural effusion    History of 2019 novel coronavirus disease (COVID-19)  2020    Hemorrhoids    Bleeding hemorrhoids    Peripheral arterial disease    Claudication    BPH with urinary obstruction    ACC/AHA stage D systolic heart failure    Anticoagulation goal of INR 2.0 to 2.5    Falls    Clavicle fracture    CKD (chronic kidney disease), stage III    Iron deficiency anemia    H/O epistaxis    Vertigo    GI bleed    S/P TVR (tricuspid valve repair)    S/P ventricular assist device    S/P endoscopy      ====================ASSESSMENT ==============  Stage D Nonischemic Cardiomyopathy, Status Post HM2 on 2017    Cardiogenic shock  Hemodynamic instability   Acute hypoxemic respiratory failure  GI bleed , Status Post Enteroscopy   Anemia, in setting of melena   Leukocytosis   Chronic Kidney Disease  Stress hyperglycemia   C.diff positive on    Hypovolemic shock  Septic shock      Plan:  ====================== NEUROLOGY=====================  CTH on  with no acute intracranial hemorrhage or acute territorial infarction  Tylenol PRN for analgesia   Clonazepam for anxiety     acetaminophen    Suspension .. 650 milliGRAM(s) Oral every 6 hours PRN Mild Pain (1 - 3)  clonazePAM  Tablet 0.5 milliGRAM(s) Oral at bedtime  mirtazapine 7.5 milliGRAM(s) Oral daily    ==================== RESPIRATORY======================  Acute hypoxemic respiratory failure s/p #8 shiley trach on ; TC vent weaning as tolerated  Requiring intermittent full vent support, continue close monitoring of respiratory rate and breathing pattern, following of ABG’s with A-line monitoring, continuous pulse oximetry monitoring.   CT chest : Sub q emphysema in R chest wall, GGO RUL     ====================CARDIOVASCULAR==================  Stage D Nonischemic Cardiomyopathy, Status Post HM2 on 2017; LVAD settings 9200 with flow of 5.5  TTE : reveals at least moderate MR, mild AI, severe global LV syst dysfxn, dec RV fxn   Continue invasive hemodynamic monitoring     ===================HEMATOLOGIC/ONC ===================  Anemia due to acute blood in setting of recent upper GI bleed   Continue monitoring daily LDH   Monitor H&H/Plts closely   Continue Heparin for venous thromboembolism prophylaxis.   No anticoagulation as he has had multiple GI bleeds    heparin   Injectable 5000 Unit(s) SubCutaneous every 8 hours    ===================== RENAL =========================  Continue to monitor I/Os, BUN/Creatinine, and urine output.   Goal net negative fluid balance. Replete lytes PRN. Keep K> 4 and Mg >2.     ==================== GASTROINTESTINAL===================  CT A/P on : small ascites; ABD US showing GB wall thickening w/ surrounding pericholecystic fluid  S/p cholecystostomy tube placed on : with IR with -60cc of black bile, will monitor site closely.   Encourage tube feeds for nutrition  C.diff + on , vanco dc'ed yesterday  Bowel regimen with Miralax.   Continue Protonix for stress ulcer prophylaxis.   Reglan for gut motility   Zofran for nausea  Serial abdominal exams, if he exam worsens consider CT abdomen/pelvis w/PO and IV contrast    dextrose 5%. 1000 milliLiter(s) (25 mL/Hr) IV Continuous <Continuous>  pantoprazole  Injectable 40 milliGRAM(s) IV Push every 12 hours  polyethylene glycol 3350 17 Gram(s) Oral daily  senna 2 Tablet(s) Oral at bedtime  metoclopramide Injectable 10 milliGRAM(s) IV Push every 8 hours  ondansetron Injectable 4 milliGRAM(s) IV Push every 6 hours PRN Nausea and/or Vomiting  =======================    ENDOCRINE  =====================  Stress Hyperglycemia, glycemic control with Admelog sliding scale  Monitor blood glucose levels.  insulin lispro (ADMELOG) corrective regimen sliding scale   SubCutaneous every 6 hours    ========================INFECTIOUS DISEASE================  Afebrile, WBC within normal limits  Continue trending WBC and monitoring fever curve   SCX on  serratia and stenotro, SCx on  stenotro   Per ID, will consider adding bactrim if clinical condition worsens.       Patient requires continuous monitoring with bedside rhythm monitoring, pulse ox monitoring, and intermittent blood gas analysis. Care plan discussed with ICU care team. Patient remained critical and at risk for life threatening decompensation.     By signing my name below, I, Emily Roa, attest that this documentation has been prepared under the direction and in the presence of CLAUDIA Bey   Electronically signed: Emily Roa Scribe, 21 @ 07:22    I, CLAUDIA Bey  , personally performed the services described in this documentation. all medical record entries made by the luisibmel were at my direction and in my presence. I have reviewed the chart and agree that the record reflects my personal performance and is accurate and complete  Electronically signed: CLAUDIA Bey        RICKY HAMPTON  MRN-[id number]  Patient is a 66y old  Male who presents with a chief complaint of Anemia, Supratherapeutic INR, Dark Stools (08 Aug 2021 16:09)    HPI:  65M PMH ACC/AHA stage D HF due to NICM HM2 LVAD , TV annuloplasty ring 17 as destination therapy due to severe peripheral artery disease with significant stenosis  SIADH, Depression, CKD-3 with hyperkalemia, past E. coli UTIs, driveline drainage (21) and COVID-19 (back in 2020)  He was recently seen in clinic where he complained of abdominal pain and dark stools w constipation back in May. He presents to [facility] ER today weakness and fatigue, moderate and + Black stools for three days, on coumadin secondary to warfarin use in the setting of an LVAD. Patient has required transfusions for GIB in the past. Mostly recently back in 2021 pt had anemia with dark stools. No interventions was done at that time. However Last Endoscopy was done in 2020 (negative). Today labs show patient is anemic with H/H of 4.5/16.3,. INR is 8.84 MAP in the 90s, Temp 35.1. He denies any chest pain, shortness of breath, dizziness, abd pain, nausea or vomiting.       (2021 16:57)      Surgery/Hospital Course:   admit for melena w/ anemia, INR 8.84   6/15 Capsul study (+) for small bowel bleed, balloon endoscopy (old blood in prox ileum); post EGD - septic w/ L opacity, re-intubated for concern for aspiration, TTE (Mod MR, decrease biV w/ interventricular septum boweing towards R)   bronch    +C Diff    TC    CT C/A/P: Fluid filled colon which may be 2/2 rapid transit. Small bilateral pleffs with associates. Compressive atelectasis New ISABELLE & LLL  parenchymal opacities, suspicious for pneumonia. 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    #8 Shiley trach at bedside    CPAP trials    LVAD speed increased to 9200   Bronch; Central line dc'ed   TC since , continue as tolerated. Patient transferred to SDU.    Cont PT, no trach downsize today(#8cuffed)/ Anticoagulation/ Continue TF, speech f/u/ Vanco taper    oob to chair  INR  2.47  5 mg coumadin     increased lop to 25 q8  per HF   INR today 2.64.  H&H 7.3 this AM.  Will repeat CBC at noon, and will send stool guaiac Patient with persistent abdominal tenderness, rate of tube feeds decreased.  No nausea/vomiting.     INR today 2.4H&H 9.1/.6 low flow overnight /N&V  NPO resolved for capsule study  Coumadin on hold refusing Tube feeds on D5 / normal  @50 cc/hr   INR 2.69  H&H 7..1 refusing Tube feeds on D5 2 normal  @50 cc/hr. This am + BM Anusha Oneill HF  aware- PRBC x1  GI team consulted -  NPO  plan on study in am-  D/w Dr Cadet Patient  to return to CTU for further management; 1PRBCS    Post op INR 2.2 today.  No bleeding. BC + for SM.  Pt is hypotensive requiring pressor and inotropic support.  ID follow up today on Cefepime will follow.   R PTC for PTX    CT C/A/P: sub q emphysema in R chest wall, GGO RUL, small ascites CTH negative; Abd US: GB thickening, pericholecystic fluid     Perchole drain in place continues to drain total output overnight 133.  Fever today 38.8 given tylenol.  Will repeat BC now.     duplex LE negative    Patient with persistent abdominal pain, refusing tube feeds and medications, Psych consulted    Today: Labs indicate hyperthryoidism--US of thyroid ordered, started on methimazole. Pt allowed us to restart TF, Vital at 10. Will obtain CTA if abdominal pain worsens.     REVIEW OF SYSTEMS:  Unable to obtain secondary to pt being trached      ICU Vital Signs Last 24 Hrs  T(C): 36.7 (09 Aug 2021 04:00), Max: 36.8 (08 Aug 2021 08:00)  T(F): 98.1 (09 Aug 2021 04:00), Max: 98.3 (08 Aug 2021 08:00)  HR: 111 (09 Aug 2021 07:00) (105 - 119)  BP: --  BP(mean): --  ABP: 96/71 (09 Aug 2021 07:00) (79/51 - 104/67)  ABP(mean): 81 (09 Aug 2021 07:00) (61 - 82)  RR: 33 (09 Aug 2021 07:00) (0 - 40)  SpO2: 99% (09 Aug 2021 07:00) (92% - 100%)      Physical Exam:  Gen:  awake and alert, NAD  CNS:  intact, nonfocal, responds to all commands  Neck: no JVD, +TC   RES : course breath sounds, no wheezing              CVS: +LVAD hum   Abd: Soft, minimally-moderately tender in RUQ by perc dawn site, NT everywhere else, positive BS throughout. Perc dawn drain site c/d/i draining bilious fluid.  Skin: No rash  Ext:  no edema    ============================I/O===========================   I&O's Detail    08 Aug 2021 07:01  -  09 Aug 2021 07:00  --------------------------------------------------------  IN:    dextrose 5%: 720 mL    Enteral Tube Flush: 20 mL  Total IN: 740 mL    OUT:    Drain (mL): 360 mL    Miscellaneous Tube Feedin mL    Voided (mL): 850 mL  Total OUT: 1210 mL    Total NET: -470 mL    ============================ LABS =========================                        8.2    11.31 )-----------( 289      ( 09 Aug 2021 00:33 )             26.8     -    134<L>  |  97  |  6<L>  ----------------------------<  83  3.8   |  24  |  0.56    Ca    9.1      09 Aug 2021 00:33  Phos  3.3       Mg     1.5         TPro  7.7  /  Alb  3.6  /  TBili  0.6  /  DBili  x   /  AST  25  /  ALT  24  /  AlkPhos  137<H>      LIVER FUNCTIONS - ( 09 Aug 2021 00:33 )  Alb: 3.6 g/dL / Pro: 7.7 g/dL / ALK PHOS: 137 U/L / ALT: 24 U/L / AST: 25 U/L / GGT: x           PT/INR - ( 07 Aug 2021 16:03 )   PT: 21.0 sec;   INR: 1.80 ratio           ABG - ( 08 Aug 2021 23:55 )  pH, Arterial: 7.41  pH, Blood: x     /  pCO2: 44    /  pO2: 111   / HCO3: 28    / Base Excess: 3.0   /  SaO2: 99                  ======================Micro/Rad/Cardio=================  Culture: Reviewed   CXR: Reviewed  Echo:Reviewed  ======================================================  PAST MEDICAL & SURGICAL HISTORY:  CHF (congestive heart failure)    CAD (coronary artery disease)    Depression    Pleural effusion    History of 2019 novel coronavirus disease (COVID-19)  2020    Hemorrhoids    Bleeding hemorrhoids    Peripheral arterial disease    Claudication    BPH with urinary obstruction    ACC/AHA stage D systolic heart failure    Anticoagulation goal of INR 2.0 to 2.5    Falls    Clavicle fracture    CKD (chronic kidney disease), stage III    Iron deficiency anemia    H/O epistaxis    Vertigo    GI bleed    S/P TVR (tricuspid valve repair)    S/P ventricular assist device    S/P endoscopy      ====================ASSESSMENT ==============  Stage D Nonischemic Cardiomyopathy, Status Post HM2 on 2017    Cardiogenic shock  Hemodynamic instability   Acute hypoxemic respiratory failure  GI bleed , Status Post Enteroscopy   Anemia, in setting of melena   Leukocytosis   Chronic Kidney Disease  Stress hyperglycemia   C.diff positive on    Hypovolemic shock  Septic shock      Plan:  ====================== NEUROLOGY=====================  CTH on  with no acute intracranial hemorrhage or acute territorial infarction  Tylenol PRN for analgesia   Clonazepam for anxiety     acetaminophen    Suspension .. 650 milliGRAM(s) Oral every 6 hours PRN Mild Pain (1 - 3)  clonazePAM  Tablet 0.5 milliGRAM(s) Oral at bedtime  mirtazapine 7.5 milliGRAM(s) Oral daily    ==================== RESPIRATORY======================  Acute hypoxemic respiratory failure s/p #8 shiley trach on ; TC vent weaning as tolerated  Requiring intermittent full vent support, continue close monitoring of respiratory rate and breathing pattern, following of ABG’s with A-line monitoring, continuous pulse oximetry monitoring.   CT chest : Sub q emphysema in R chest wall, GGO RUL     ====================CARDIOVASCULAR==================  Stage D Nonischemic Cardiomyopathy, Status Post HM2 on 2017; LVAD settings 9200 with flow of 5.5  TTE : reveals at least moderate MR, mild AI, severe global LV syst dysfxn, dec RV fxn   Continue invasive hemodynamic monitoring     ===================HEMATOLOGIC/ONC ===================  Anemia due to acute blood in setting of recent upper GI bleed   Continue monitoring daily LDH   Monitor H&H/Plts closely   Continue Heparin for venous thromboembolism prophylaxis.   No anticoagulation as he has had multiple GI bleeds    heparin   Injectable 5000 Unit(s) SubCutaneous every 8 hours    ===================== RENAL =========================  Continue to monitor I/Os, BUN/Creatinine, and urine output.   Goal net negative fluid balance. Replete lytes PRN. Keep K> 4 and Mg >2.     ==================== GASTROINTESTINAL===================  CT A/P on : small ascites; ABD US showing GB wall thickening w/ surrounding pericholecystic fluid  S/p cholecystostomy tube placed on : with IR with -60cc of black bile, will monitor site closely.   Encourage tube feeds for nutrition  C.diff + on , vanco ME'ed yesterday  Bowel regimen with Miralax.   Continue Protonix for stress ulcer prophylaxis.   Reglan for gut motility   Zofran for nausea  Serial abdominal exams, if he exam worsens consider CT abdomen/pelvis w/PO and IV contrast    dextrose 5%. 1000 milliLiter(s) (25 mL/Hr) IV Continuous <Continuous>  pantoprazole  Injectable 40 milliGRAM(s) IV Push every 12 hours  polyethylene glycol 3350 17 Gram(s) Oral daily  senna 2 Tablet(s) Oral at bedtime  metoclopramide Injectable 10 milliGRAM(s) IV Push every 8 hours  ondansetron Injectable 4 milliGRAM(s) IV Push every 6 hours PRN Nausea and/or Vomiting  =======================    ENDOCRINE  =====================  Stress Hyperglycemia, glycemic control with Admelog sliding scale  Monitor blood glucose levels.  insulin lispro (ADMELOG) corrective regimen sliding scale   SubCutaneous every 6 hours    ========================INFECTIOUS DISEASE================  Afebrile, WBC within normal limits  Continue trending WBC and monitoring fever curve   SCX on  serratia and stenotro, SCx on  stenotro   Per ID, will consider adding bactrim if clinical condition worsens.       Patient requires continuous monitoring with bedside rhythm monitoring, pulse ox monitoring, and intermittent blood gas analysis. Care plan discussed with ICU care team. Patient remained critical and at risk for life threatening decompensation.     By signing my name below, IJanina Tiffany, attest that this documentation has been prepared under the direction and in the presence of CLAUDIA Bey   Electronically signed: Emily Roa Scribe, 21 @ 07:22    Luciano STANLEY PA  , personally performed the services described in this documentation. all medical record entries made by the luisibe were at my direction and in my presence. I have reviewed the chart and agree that the record reflects my personal performance and is accurate and complete  Electronically signed: CLAUDIA Bey        RICKY HAMPTON  MRN-[id number]  Patient is a 66y old  Male who presents with a chief complaint of Anemia, Supratherapeutic INR, Dark Stools (08 Aug 2021 16:09)    HPI:  65M PMH ACC/AHA stage D HF due to NICM HM2 LVAD , TV annuloplasty ring 17 as destination therapy due to severe peripheral artery disease with significant stenosis  SIADH, Depression, CKD-3 with hyperkalemia, past E. coli UTIs, driveline drainage (21) and COVID-19 (back in 2020)  He was recently seen in clinic where he complained of abdominal pain and dark stools w constipation back in May. He presents to [facility] ER today weakness and fatigue, moderate and + Black stools for three days, on coumadin secondary to warfarin use in the setting of an LVAD. Patient has required transfusions for GIB in the past. Mostly recently back in 2021 pt had anemia with dark stools. No interventions was done at that time. However Last Endoscopy was done in 2020 (negative). Today labs show patient is anemic with H/H of 4.5/16.3,. INR is 8.84 MAP in the 90s, Temp 35.1. He denies any chest pain, shortness of breath, dizziness, abd pain, nausea or vomiting.       (2021 16:57)      Surgery/Hospital Course:   admit for melena w/ anemia, INR 8.84   6/15 Capsul study (+) for small bowel bleed, balloon endoscopy (old blood in prox ileum); post EGD - septic w/ L opacity, re-intubated for concern for aspiration, TTE (Mod MR, decrease biV w/ interventricular septum boweing towards R)   bronch    +C Diff    TC    CT C/A/P: Fluid filled colon which may be 2/2 rapid transit. Small bilateral pleffs with associates. Compressive atelectasis New ISABELLE & LLL  parenchymal opacities, suspicious for pneumonia. 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    #8 Shiley trach at bedside    CPAP trials    LVAD speed increased to 9200   Bronch; Central line dc'ed   TC since , continue as tolerated. Patient transferred to SDU.    Cont PT, no trach downsize today(#8cuffed)/ Anticoagulation/ Continue TF, speech f/u/ Vanco taper    oob to chair  INR  2.47  5 mg coumadin     increased lop to 25 q8  per HF   INR today 2.64.  H&H 7.3 this AM.  Will repeat CBC at noon, and will send stool guaiac Patient with persistent abdominal tenderness, rate of tube feeds decreased.  No nausea/vomiting.     INR today 2.4H&H 9.1/.6 low flow overnight /N&V  NPO resolved for capsule study  Coumadin on hold refusing Tube feeds on D5 / normal  @50 cc/hr   INR 2.69  H&H 7..1 refusing Tube feeds on D5 2 normal  @50 cc/hr. This am + BM Anusha Oneill HF  aware- PRBC x1  GI team consulted -  NPO  plan on study in am-  D/w Dr Cadet Patient  to return to CTU for further management; 1PRBCS    Post op INR 2.2 today.  No bleeding. BC + for SM.  Pt is hypotensive requiring pressor and inotropic support.  ID follow up today on Cefepime will follow.   R PTC for PTX    CT C/A/P: sub q emphysema in R chest wall, GGO RUL, small ascites CTH negative; Abd US: GB thickening, pericholecystic fluid     Perchole drain in place continues to drain total output overnight 133.  Fever today 38.8 given tylenol.  Will repeat BC now.     duplex LE negative    Patient with persistent abdominal pain, refusing tube feeds and medications, Psych consulted    Today: Labs indicate hyperthryoidism--US of thyroid ordered, started on methimazole. Pt allowed us to restart TF, Vital at 10. Will obtain CTA if abdominal pain worsens.     REVIEW OF SYSTEMS:  Unable to obtain secondary to pt being trached      ICU Vital Signs Last 24 Hrs  T(C): 36.7 (09 Aug 2021 04:00), Max: 36.8 (08 Aug 2021 08:00)  T(F): 98.1 (09 Aug 2021 04:00), Max: 98.3 (08 Aug 2021 08:00)  HR: 111 (09 Aug 2021 07:00) (105 - 119)  BP: --  BP(mean): --  ABP: 96/71 (09 Aug 2021 07:00) (79/51 - 104/67)  ABP(mean): 81 (09 Aug 2021 07:00) (61 - 82)  RR: 33 (09 Aug 2021 07:00) (0 - 40)  SpO2: 99% (09 Aug 2021 07:00) (92% - 100%)      Physical Exam:  Gen:  awake and alert, NAD  CNS:  intact, nonfocal, responds to all commands  Neck: no JVD, +TC   RES : course breath sounds, no wheezing              CVS: +LVAD hum   Abd: Soft, minimally-moderately tender in RUQ by perc dawn site, NT everywhere else, positive BS throughout. Perc dawn drain site c/d/i draining bilious fluid.  Skin: No rash  Ext:  no edema    ============================I/O===========================   I&O's Detail    08 Aug 2021 07:01  -  09 Aug 2021 07:00  --------------------------------------------------------  IN:    dextrose 5%: 720 mL    Enteral Tube Flush: 20 mL  Total IN: 740 mL    OUT:    Drain (mL): 360 mL    Miscellaneous Tube Feedin mL    Voided (mL): 850 mL  Total OUT: 1210 mL    Total NET: -470 mL    ============================ LABS =========================                        8.2    11.31 )-----------( 289      ( 09 Aug 2021 00:33 )             26.8     -    134<L>  |  97  |  6<L>  ----------------------------<  83  3.8   |  24  |  0.56    Ca    9.1      09 Aug 2021 00:33  Phos  3.3       Mg     1.5         TPro  7.7  /  Alb  3.6  /  TBili  0.6  /  DBili  x   /  AST  25  /  ALT  24  /  AlkPhos  137<H>      LIVER FUNCTIONS - ( 09 Aug 2021 00:33 )  Alb: 3.6 g/dL / Pro: 7.7 g/dL / ALK PHOS: 137 U/L / ALT: 24 U/L / AST: 25 U/L / GGT: x           PT/INR - ( 07 Aug 2021 16:03 )   PT: 21.0 sec;   INR: 1.80 ratio           ABG - ( 08 Aug 2021 23:55 )  pH, Arterial: 7.41  pH, Blood: x     /  pCO2: 44    /  pO2: 111   / HCO3: 28    / Base Excess: 3.0   /  SaO2: 99                  ======================Micro/Rad/Cardio=================  Culture: Reviewed   CXR: Reviewed  Echo:Reviewed  ======================================================  PAST MEDICAL & SURGICAL HISTORY:  CHF (congestive heart failure)    CAD (coronary artery disease)    Depression    Pleural effusion    History of 2019 novel coronavirus disease (COVID-19)  2020    Hemorrhoids    Bleeding hemorrhoids    Peripheral arterial disease    Claudication    BPH with urinary obstruction    ACC/AHA stage D systolic heart failure    Anticoagulation goal of INR 2.0 to 2.5    Falls    Clavicle fracture    CKD (chronic kidney disease), stage III    Iron deficiency anemia    H/O epistaxis    Vertigo    GI bleed    S/P TVR (tricuspid valve repair)    S/P ventricular assist device    S/P endoscopy      ====================ASSESSMENT ==============  Stage D Nonischemic Cardiomyopathy, Status Post HM2 on 2017    Cardiogenic shock  Hemodynamic instability   Acute hypoxemic respiratory failure  GI bleed , Status Post Enteroscopy   Anemia, in setting of melena   Leukocytosis   Chronic Kidney Disease  Stress hyperglycemia   C.diff positive on    Hypovolemic shock  Septic shock      Plan:  ====================== NEUROLOGY=====================  CTH on  with no acute intracranial hemorrhage or acute territorial infarction  Tylenol PRN for analgesia   Clonazepam for anxiety     acetaminophen    Suspension .. 650 milliGRAM(s) Oral every 6 hours PRN Mild Pain (1 - 3)  clonazePAM  Tablet 0.5 milliGRAM(s) Oral at bedtime  mirtazapine 7.5 milliGRAM(s) Oral daily    ==================== RESPIRATORY======================  Acute hypoxemic respiratory failure s/p #8 shiley trach on ; TC vent weaning as tolerated  Requiring intermittent full vent support, continue close monitoring of respiratory rate and breathing pattern, following of ABG’s with A-line monitoring, continuous pulse oximetry monitoring.   CT chest : Sub q emphysema in R chest wall, GGO RUL     ====================CARDIOVASCULAR==================  Stage D Nonischemic Cardiomyopathy, Status Post HM2 on 2017; LVAD settings 9200 with flow of 5.5  TTE : reveals at least moderate MR, mild AI, severe global LV syst dysfxn, dec RV fxn   Continue invasive hemodynamic monitoring     ===================HEMATOLOGIC/ONC ===================  Anemia due to acute blood in setting of recent upper GI bleed   Continue monitoring daily LDH   Monitor H&H/Plts closely   Continue SQ Heparin for venous thromboembolism prophylaxis.   No anticoagulation as he has had multiple GI bleeds    heparin   Injectable 5000 Unit(s) SubCutaneous every 8 hours    ===================== RENAL =========================  Continue to monitor I/Os, BUN/Creatinine, and urine output.   Goal net negative fluid balance. Replete lytes PRN. Keep K> 4 and Mg >2.     ==================== GASTROINTESTINAL===================  CT A/P on : small ascites; ABD US showing GB wall thickening w/ surrounding pericholecystic fluid  S/p cholecystostomy tube placed on : with IR with -60cc of black bile, will monitor site closely.   Encourage tube feeds for nutrition  C.diff + on , vanco dc'ed   Bowel regimen with Miralax.   Continue Protonix for stress ulcer prophylaxis.   Reglan for gut motility   Zofran for nausea  Serial abdominal exams, if he exam worsens consider CTA    dextrose 5%. 1000 milliLiter(s) (25 mL/Hr) IV Continuous <Continuous>  pantoprazole  Injectable 40 milliGRAM(s) IV Push every 12 hours  polyethylene glycol 3350 17 Gram(s) Oral daily  senna 2 Tablet(s) Oral at bedtime  metoclopramide Injectable 10 milliGRAM(s) IV Push every 8 hours  ondansetron Injectable 4 milliGRAM(s) IV Push every 6 hours PRN Nausea and/or Vomiting  =======================    ENDOCRINE  =====================  Stress Hyperglycemia, glycemic control with Admelog sliding scale  Monitor blood glucose levels.  insulin lispro (ADMELOG) corrective regimen sliding scale   SubCutaneous every 6 hours    ========================INFECTIOUS DISEASE================  Afebrile, WBC within normal limits  Continue trending WBC and monitoring fever curve   SCX on  serratia and stenotro, SCx on  stenotro   Per ID, will consider adding bactrim if clinical condition worsens.       Patient requires continuous monitoring with bedside rhythm monitoring, pulse ox monitoring, and intermittent blood gas analysis. Care plan discussed with ICU care team. Patient remained critical and at risk for life threatening decompensation.     By signing my name below, I, Emily Roa, attest that this documentation has been prepared under the direction and in the presence of CLAUDIA Bey   Electronically signed: Emily Roa Scribe, 21 @ 07:22    ILuicano PA  , personally performed the services described in this documentation. all medical record entries made by the luisibmel were at my direction and in my presence. I have reviewed the chart and agree that the record reflects my personal performance and is accurate and complete  Electronically signed: CLAUDIA Bey

## 2021-08-09 NOTE — PROGRESS NOTE ADULT - SUBJECTIVE AND OBJECTIVE BOX
INFECTIOUS DISEASES FOLLOW UP-- Anitra Prater  [phone]    This is a follow up note for this  66yMale with  Anemia    Acute cholecystitis        ROS:  CONSTITUTIONAL:  No fever, good appetite  CARDIOVASCULAR:  No chest pain or palpitations  RESPIRATORY:  No dyspnea  GASTROINTESTINAL:  No nausea, vomiting, diarrhea, or abdominal pain  GENITOURINARY:  No dysuria  NEUROLOGIC:  No headache,     Allergies    No Known Allergies    Intolerances        ANTIBIOTICS/RELEVANT:  antimicrobials    immunologic:    OTHER:  acetaminophen    Suspension .. 650 milliGRAM(s) Oral every 6 hours PRN  chlorhexidine 0.12% Liquid 15 milliLiter(s) Oral Mucosa every 12 hours  chlorhexidine 2% Cloths 1 Application(s) Topical <User Schedule>  clonazePAM  Tablet 0.5 milliGRAM(s) Oral at bedtime  dextrose 5%. 1000 milliLiter(s) IV Continuous <Continuous>  heparin   Injectable 5000 Unit(s) SubCutaneous every 8 hours  insulin lispro (ADMELOG) corrective regimen sliding scale   SubCutaneous every 6 hours  methimazole 10 milliGRAM(s) Oral every 8 hours  metoclopramide Injectable 10 milliGRAM(s) IV Push every 8 hours  mirtazapine 7.5 milliGRAM(s) Oral daily  ondansetron Injectable 4 milliGRAM(s) IV Push every 6 hours PRN  pantoprazole  Injectable 40 milliGRAM(s) IV Push every 12 hours  polyethylene glycol 3350 17 Gram(s) Oral daily  senna 2 Tablet(s) Oral at bedtime      Objective:  Vital Signs Last 24 Hrs  T(C): 37 (09 Aug 2021 16:00), Max: 37 (09 Aug 2021 16:00)  T(F): 98.6 (09 Aug 2021 16:00), Max: 98.6 (09 Aug 2021 16:00)  HR: 115 (09 Aug 2021 18:00) (105 - 119)  BP: --  BP(mean): --  RR: 19 (09 Aug 2021 18:00) (10 - 38)  SpO2: 99% (09 Aug 2021 18:00) (95% - 100%)    PHYSICAL EXAM:  Constitutional:no acute distress  Eyes:ALONSO, EOMI  Ear/Nose/Throat: no oral lesions, 	  Respiratory: clear BL  Cardiovascular: S1S2  Gastrointestinal:soft, (+) BS, no tenderness  Extremities:no e/e/c  No Lymphadenopathy  IV sites not inflammed.    LABS:                        8.2    11.31 )-----------( 289      ( 09 Aug 2021 00:33 )             26.8     08-09    134<L>  |  97  |  6<L>  ----------------------------<  83  3.8   |  24  |  0.56    Ca    9.1      09 Aug 2021 00:33  Phos  3.3     08-09  Mg     1.5     08-09    TPro  7.7  /  Alb  3.6  /  TBili  0.6  /  DBili  x   /  AST  25  /  ALT  24  /  AlkPhos  137<H>  08-09          MICROBIOLOGY:    Culture - Sputum . (08.01.21 @ 17:37)    -  Ceftazidime: S 4    Gram Stain:   Moderate polymorphonuclear leukocytes seen per low power field  No Squamous epithelial cells seen per low power field  Moderate Gram Negative Rods seen per oil power field    -  Levofloxacin: S <=0.5    -  Trimethoprim/Sulfamethoxazole: S <=0.5/9.5    Specimen Source: .Sputum Sputum    Culture Results:   Moderate Stenotrophomonas maltophilia  Normal Respiratory Bria absent    Organism Identification: Stenotrophomonas maltophilia    Organism: Stenotrophomonas maltophilia    Method Type: CLAU            RECENT CULTURES:      RADIOLOGY & ADDITIONAL STUDIES:  < from: Xray Chest 1 View- PORTABLE-Routine (Xray Chest 1 View- PORTABLE-Routine in AM.) (08.09.21 @ 02:03) >  IMPRESSION:    Clear lungs.    < end of copied text >   INFECTIOUS DISEASES FOLLOW UP-- Anitra Prater  [phone]    This is a follow up note for this  66yMale with  Anemia    Acute cholecystitis        ROS:  CONSTITUTIONAL:  No fever,  CARDIOVASCULAR:  No chest pain or palpitations  RESPIRATORY:  No dyspnea  GASTROINTESTINAL:  No nausea, vomiting, diarrhea, less abdominal pain  GENITOURINARY:  No dysuria  NEUROLOGIC:  No headache,     Allergies    No Known Allergies    Intolerances        ANTIBIOTICS/RELEVANT:  antimicrobials    immunologic:    OTHER:  acetaminophen    Suspension .. 650 milliGRAM(s) Oral every 6 hours PRN  chlorhexidine 0.12% Liquid 15 milliLiter(s) Oral Mucosa every 12 hours  chlorhexidine 2% Cloths 1 Application(s) Topical <User Schedule>  clonazePAM  Tablet 0.5 milliGRAM(s) Oral at bedtime  dextrose 5%. 1000 milliLiter(s) IV Continuous <Continuous>  heparin   Injectable 5000 Unit(s) SubCutaneous every 8 hours  insulin lispro (ADMELOG) corrective regimen sliding scale   SubCutaneous every 6 hours  methimazole 10 milliGRAM(s) Oral every 8 hours  metoclopramide Injectable 10 milliGRAM(s) IV Push every 8 hours  mirtazapine 7.5 milliGRAM(s) Oral daily  ondansetron Injectable 4 milliGRAM(s) IV Push every 6 hours PRN  pantoprazole  Injectable 40 milliGRAM(s) IV Push every 12 hours  polyethylene glycol 3350 17 Gram(s) Oral daily  senna 2 Tablet(s) Oral at bedtime      Objective:  Vital Signs Last 24 Hrs  T(C): 37 (09 Aug 2021 16:00), Max: 37 (09 Aug 2021 16:00)  T(F): 98.6 (09 Aug 2021 16:00), Max: 98.6 (09 Aug 2021 16:00)  HR: 115 (09 Aug 2021 18:00) (105 - 119)  BP: --  BP(mean): --  RR: 19 (09 Aug 2021 18:00) (10 - 38)  SpO2: 99% (09 Aug 2021 18:00) (95% - 100%)    PHYSICAL EXAM:  Constitutional:no acute distress  Eyes:ALONSO, EOMI  Ear/Nose/Throat: no oral lesions, 	  Respiratory: clear BL  Cardiovascular: S1S2  Gastrointestinal:soft, (+) BS, no tenderness  Extremities:no e/e/c  No Lymphadenopathy  IV sites not inflammed.    LABS:                        8.2    11.31 )-----------( 289      ( 09 Aug 2021 00:33 )             26.8     08-09    134<L>  |  97  |  6<L>  ----------------------------<  83  3.8   |  24  |  0.56    Ca    9.1      09 Aug 2021 00:33  Phos  3.3     08-09  Mg     1.5     08-09    TPro  7.7  /  Alb  3.6  /  TBili  0.6  /  DBili  x   /  AST  25  /  ALT  24  /  AlkPhos  137<H>  08-09          MICROBIOLOGY:    Culture - Sputum . (08.01.21 @ 17:37)    -  Ceftazidime: S 4    Gram Stain:   Moderate polymorphonuclear leukocytes seen per low power field  No Squamous epithelial cells seen per low power field  Moderate Gram Negative Rods seen per oil power field    -  Levofloxacin: S <=0.5    -  Trimethoprim/Sulfamethoxazole: S <=0.5/9.5    Specimen Source: .Sputum Sputum    Culture Results:   Moderate Stenotrophomonas maltophilia  Normal Respiratory Bria absent    Organism Identification: Stenotrophomonas maltophilia    Organism: Stenotrophomonas maltophilia    Method Type: CLAU            RECENT CULTURES:      RADIOLOGY & ADDITIONAL STUDIES:  < from: Xray Chest 1 View- PORTABLE-Routine (Xray Chest 1 View- PORTABLE-Routine in AM.) (08.09.21 @ 02:03) >  IMPRESSION:    Clear lungs.    < end of copied text >

## 2021-08-09 NOTE — PROGRESS NOTE ADULT - ASSESSMENT
Assessment and Recommendation:   · Assessment	  Assessment and recommendation :  Recurrent Acute hypoxic respiratory Failure S/P tracheostomy on trach-collar  at 50% FI02  Acute blood loss anemia S/P multiple  blood transfusion   S/P septic shock off vasopressin , levophed and off  Dobutamine   S/P sepsis with serratia marcescens in the blood S/P  cefepime  S/P cholecystostomy tube placement by IR    AF RVR start on bolus Amiodarone   S/P Acute left lower lobe pneumonia   Aspiration pneumonia   Stool is  +ve for C-diff. colitis on PO vancomycin  improved  Non ischemic cardiomyopathy continue ACE inhibitor and B-Blockers   S/P Septic shock and cardiogenic shock   Stage D systolic heart failure S/P LVAD HM2   MH2 LVAD  with  TV Annuloplasty  Severe peripheral vascular disease   severe hyperglycemia on insulin coverage    cardiac arrythmia  on  Amiodarone   critical care polyneuropathy   Anemia of Acute blood Loss   severe protein caloric malnutrition   S/P Small bowel bleeding   S/P blood and FFP transfusion   Chronic kidney disease stage III   NGT feeding hold patient refuse psychiatry evaluation   GI prophylaxis with PPI

## 2021-08-09 NOTE — PROGRESS NOTE ADULT - SUBJECTIVE AND OBJECTIVE BOX
RICKY JOINT  MRN#: [id number]  Subjective:  Pulmonary progress  : recurrent Acute hypoxic respiratory Failure ,aspiration pneumonia, NICM  , chart reviewed and H/O obtained radiological and Laboratory study reviewed patient Examined     65M PMH ACC/AHA stage D HF due to NICM HM2 LVAD , TV annuloplasty ring 17 as destination therapy due to severe peripheral artery disease with significant stenosis  SIADH, Depression, CKD-3 with hyperkalemia, past E. coli UTIs, driveline drainage (21) and COVID-19 (back in 2020)  He was recently seen in clinic where he complained of abdominal pain and dark stools w constipation back in May. He presents to [facility] ER today weakness and fatigue, moderate and + Black stools for three days, on coumadin secondary to warfarin use in the setting of an LVAD. Patient has required transfusions for GIB in the past. Mostly recently back in 2021 pt had anemia with dark stools. No interventions was done at that time. However Last Endoscopy was done in 2020 (negative). Today labs show patient is anemic with H/H of 4.5/16.3,. INR is 8.84 MAP in the 90s, Temp 35.1. He denies any chest pain, shortness of breath, dizziness, abd pain, nausea or vomiting. found to have  rectal bleeding underwent endoscopy ,old blood in the proximal ileum ,  develop sepsis with LL opacity given Antibiotics , Extubated , reintubated , Bronchoscopy on Zosyn for LL pneumonia  and Amiodarone S/P TV Annuloplasty , patient remain intubated on full ventilatory support .S/P multiple units of blood transfusion , remain on full ventilatory support on Precedex and propofol , new central IJ line , diarrhea C diff. +ve on po vancomycin and IV Flagyl,  mildly distended belly , fever start on cefepime 2gm q 8 hrs S/P tracheostomy .  new RT Subclavian central line continue on contact  isolation ,still diarrhea on C-diff antibiotics ENT follow up appreciated , trial of C-PAP as tolerated , , copious secretion from trach. site chest x ray left lower lobe pneumonia , tolerating trch. collar 50% FI02 still excessive secretion need pulmonary toilet , off Ancef antibiotic , no more diarrhea back on full support mechanical ventilator , chest x ray show improvement in LLL air space disease, more awake and responsive on tube feeding no more diarrhea , S/P  Ancef for bacteremia no fever ,ID follow up noted ,  no nausea or vomiting or diarrhea still very weak and tired , event note pulled NG tube now replaced , back on tube feeding ,still on po vancomycin , getting PT and OT at bed side , no plan for decannulation for now. , no more diarrhea receiving PT and OPT at bed side , minimal secretion from tracheostomy site , no SOB getting stronger , improve muscle tone patient transfer to monitor bed still on contact isolation for C-Difficel colitis on 50% FI02, NG tube clogged , and change to resume tube feeding still loose stool . H/H drop significantly require blood transfusion , most likely GI bleeding , IV heparin D/C , vomiting 200 cc of creamy color tube feeding on hold no sob, still melena monitor in the CTU possible capsule endoscopy , H/H is stable ., patient develop TR sided  pneumothorax require chest tube placement , RT IJ central line  placed , develop fever shaking chills , blood culture positive for serratia marcescens , start on cefepime .the patient  become hypoxic and hypotensive placed on full ventilatory support and Vasopressin , levophed and Dobutamine ,S/P blood transfusion on meropenem and vancomycin , IR note appreciated   , on and  off pressors , occasional agitation on Precedex .S/P IR cholecystostomy tube drainage placement in the RT upper Quadrant , resume anticoagulation chest x ray noted C-PAP trail lasted only for 2 hrs , new RT SC line and D/C RT IJ line , RT pig tail cathter has been removed , tolerating C-PAP trial placed on trach. collar 50% FI02 GI consultant noted on NG tube feeding as tolerated , develop AF RVR S/P  bolus Amiodarone   on D5W IV patient refuse tube feeding back to regular sinus Rhythm , Flat Affect depressed           (2021 16:57)    PAST MEDICAL & SURGICAL HISTORY:  CHF (congestive heart failure)    CAD (coronary artery disease)  Depression    Pleural effusion    History of 2019 novel coronavirus disease (COVID-19)  2020    Hemorrhoids    Bleeding hemorrhoids    Peripheral arterial disease    Claudication    BPH with urinary obstruction    ACC/AHA stage D systolic heart failure  Anticoagulation goal of INR 2.0 to 2.5    Falls    Clavicle fracture    CKD (chronic kidney disease), stage III    Iron deficiency anemia    H/O epistaxis    Vertigo    GI bleed    S/P TVR (tricuspid valve repair)    S/P ventricular assist device    S/P endoscopy    OBJECTIVE:  ICU Vital Signs Last 24 Hrs  T(C): 38.2 (2021 10:00), Max: 38.5 (2021 12:00)  T(F): 100.8 (2021 10:00), Max: 101.3 (2021 12:00)  HR: 65 (2021 10:00) (61 - 69)  BP: --  BP(mean): --  ABP: 105/67 (2021 10:00) (90/54 - 113/64)  ABP(mean): 77 (2021 10:00) (63 - 77)  RR: 20 (2021 10:00) (19 - 35)  SpO2: 99% (2021 10:00) (96% - 100%)       @ 07:01  -   @ 07:00  --------------------------------------------------------  IN: 2693.9 mL / OUT: 1415 mL / NET: 1278.9 mL     @ 07:01  -   @ 10:49  --------------------------------------------------------  IN: 420.8 mL / OUT: 115 mL / NET: 305.8 mL  PHYSICAL EXAM:Daily   Elderly male S/P tracheostomy   on trach-collar  50% FI02      Daily Weight in k.4 (2021 00:00)  HEENT:     + NCAT  + EOMI  - Conjuctival edema   - Icterus   - Thrush   - ETT  + NGT/OGT  Neck:         + FROM  RT SCl line JVD  - Nodes - Masses + Mid-line trachea + Tracheostomy  Chest:            normal A-P diameter    Lungs:          + CTA   + Rhonchi    - Rales    - Wheezing + Decreased  LT BS   - Dullness R L  Cardiac:       + S1 + S2    + RRR   - Irregular   - S3  - S4    - Murmurs   - Rub   - Hamman’s sign   Abdomen:    + BS  + Soft + Non-tender - Distended - Organomegaly - PEG .cholecystostomy tube in place  Extremities:   - Cyanosis U/L   - Clubbing  U/L  + LE/UE Edema   + Capillary refill    + Pulses   Neuro:        - Awake   -  Alert   - Confused   - Lethargic   + Sedated  + Generalized Weakness  Skin:        - Rashes    - Erythema   + Normal incisions   + IV sites intact          HOSPITAL MEDICATIONS: All medications reviewed and analyzed  MEDICATIONS  (STANDING):  amiodarone    Tablet 200 milliGRAM(s) Oral daily  chlorhexidine 0.12% Liquid 15 milliLiter(s) Oral Mucosa every 12 hours  chlorhexidine 2% Cloths 1 Application(s) Topical <User Schedule>  dexmedetomidine Infusion 0.5 MICROgram(s)/kG/Hr (9.81 mL/Hr) IV Continuous <Continuous>  dextrose 50% Injectable 50 milliLiter(s) IV Push every 15 minutes  heparin  Infusion 400 Unit(s)/Hr (12.5 mL/Hr) IV Continuous <Continuous>  Hydromorphone  Injectable 0.5 milliGRAM(s) IV Push once  insulin lispro (ADMELOG) corrective regimen sliding scale   SubCutaneous every 6 hours  pantoprazole  Injectable 40 milliGRAM(s) IV Push every 12 hours  piperacillin/tazobactam IVPB.. 3.375 Gram(s) IV Intermittent every 8 hours  propofol Infusion 20 MICROgram(s)/kG/Min (9.42 mL/Hr) IV Continuous <Continuous>  sodium chloride 0.9% lock flush 3 milliLiter(s) IV Push every 8 hours  sodium chloride 0.9%. 1000 milliLiter(s) (10 mL/Hr) IV Continuous <Continuous>    MEDICATIONS  (PRN):  acetaminophen    Suspension .. 650 milliGRAM(s) Oral every 6 hours PRN Temp greater or equal to 38C (100.4F)    LABS: All Lab data reviewed and analyzed                        8.2    11 )-----------( 289      ( 09 Aug 2021 00:33 )             26.8    -    134<L>  |  97  |  6<L>  ----------------------------<  83  3.8   |  24  |  0.56    Ca    9.1      09 Aug 2021 00:33  Phos  3.3     08-  Mg     1.5     -    TPro  7.7  /  Alb  3.6  /  TBili  0.6  /  DBili  x   /  AST  25  /  ALT  24  /  AlkPhos  137<H>      Ca    9.5      07 Aug 2021 00:23  Phos  2.3     08-  Mg     1.8     -    TPro  7.1  /  Alb  3.3  /  TBili  0.5  /  DBili  x   /  AST  22  /  ALT  24  /  AlkPhos  147<H>      Ca    8.8      05 Aug 2021 00:53  Phos  2.9     08-  Mg     1.8     -    TPro  6.6  /  Alb  2.7<L>  /  TBili  0.5  /  DBili  x   /  AST  25  /  ALT  30  /  AlkPhos  151<H>                                                                                   PTT - ( 2021 04:52 )  PTT:45.2 sec LIVER FUNCTIONS - ( 2021 00:42 )  Alb: 3.4 g/dL / Pro: 6.7 g/dL / ALK PHOS: 213 U/L / ALT: 15 U/L / AST: 24 U/L / GGT: x           RADIOLOGY: - Reviewed and analyzed RT Pig tail cathter  , LVAD HM2, CT scan of abdomen reviewed result noted

## 2021-08-09 NOTE — PROGRESS NOTE ADULT - ASSESSMENT
Assessment  Hyperthyroidism: 66y Male with no history thyroid disease, was not on any thyroid supplements, previously euthyroid (June 2021), was on amiodarone (last dose 8/7), now in hyperthyroid state, tachycardic, trached, NPO on tube feeds. Thyroid antibody labs pending, thyroid US pending.  Hyperglycemia: Patient in nondiabetic range, A1C 5.6%, now on insulin coverage, blood sugars in acceptable range, no hypoglycemias, on TFs.  CHF: on medications, stable, monitored.  Anemia: Monitored      Pavan Barone MD  Cell: [phone]  Office: [phone]       Assessment  Hyperthyroidism: 66y Male with no history thyroid disease, was not on any thyroid supplements, previously euthyroid (June 2021), was on amiodarone (last dose 8/7), now in hyperthyroid state, tachycardic, trached, NPO on tube feeds. Thyroid  antibody labs pending, thyroid US pending.  Hyperglycemia: Patient in nondiabetic range, A1C 5.6%, now on insulin coverage, blood sugars in acceptable range, no hypoglycemias, on TFs.  CHF: on medications, stable, monitored.  Anemia: Monitored      Pavan Barone MD  Cell: [phone]  Office: [phone]

## 2021-08-09 NOTE — PROGRESS NOTE ADULT - SUBJECTIVE AND OBJECTIVE BOX
Interval Events:  - GI called back to evaluate for abd pain   - Pt seen and examined at bedside with 1. RN 2. [place]  3. NP from HF/LVAD service. Gave a few different responses to same questions regarding abdominal pain -- when asked about the chronicity of pain reported "since the surgery" several times -- unclear if referring to perc dawn or LVAD placement as he did say its been "going on for years" at times. Regarding location       Hospital Medications:  acetaminophen    Suspension .. 650 milliGRAM(s) Oral every 6 hours PRN  aMIOdarone    Tablet 200 milliGRAM(s) Oral daily  cefepime   IVPB 1000 milliGRAM(s) IV Intermittent every 8 hours  chlorhexidine 0.12% Liquid 15 milliLiter(s) Oral Mucosa every 12 hours  chlorhexidine 2% Cloths 1 Application(s) Topical <User Schedule>  clonazePAM  Tablet 0.5 milliGRAM(s) Oral at bedtime  heparin   Injectable 5000 Unit(s) SubCutaneous every 8 hours  insulin lispro (ADMELOG) corrective regimen sliding scale   SubCutaneous every 4 hours  metoclopramide Injectable 10 milliGRAM(s) IV Push every 8 hours  metroNIDAZOLE  IVPB 500 milliGRAM(s) IV Intermittent every 8 hours  octreotide  Injectable 50 MICROGram(s) IV Push every 8 hours  ondansetron Injectable 4 milliGRAM(s) IV Push every 6 hours PRN  pantoprazole  Injectable 40 milliGRAM(s) IV Push every 12 hours  polyethylene glycol 3350 17 Gram(s) Oral daily  senna 2 Tablet(s) Oral at bedtime  simethicone 80 milliGRAM(s) Chew every 8 hours PRN  sodium chloride 0.9%. 1000 milliLiter(s) IV Continuous <Continuous>  vancomycin    Solution 125 milliGRAM(s) Oral every 12 hours      PMHX/PSHX:  No pertinent past medical history    CHF (congestive heart failure)    CAD (coronary artery disease)    Depression    Pleural effusion    History of 2019 novel coronavirus disease (COVID-19)    Hemorrhoids    Bleeding hemorrhoids    Peripheral arterial disease    Claudication    BPH with urinary obstruction    ACC/AHA stage D systolic heart failure    Anticoagulation goal of INR 2.0 to 2.5    Falls    Clavicle fracture    CKD (chronic kidney disease), stage III    Iron deficiency anemia    H/O epistaxis    Vertigo    GI bleed    No significant past surgical history    S/P TVR (tricuspid valve repair)    S/P ventricular assist device    S/P endoscopy            ROS:     General:  No weight loss, fevers, chills, night sweats, fatigue   Eyes:  No vision changes  ENT:  No sore throat, pain, runny nose  CV:  No chest pain, palpitations, dizziness   Resp:  No SOB, cough, wheezing  GI:  See HPI  :  No burning with urination, hematuria  Muscle:  No pain, weakness  Neuro:  No weakness/tingling, memory problems  Psych:  No fatigue, insomnia, mood problems, depression  Heme:  No easy bruisability  Skin:  No rash, edema      PHYSICAL EXAM:    Vital Signs Last 24 Hrs  T(C): 36.9 (09 Aug 2021 12:00), Max: 36.9 (09 Aug 2021 12:00)  T(F): 98.5 (09 Aug 2021 12:00), Max: 98.5 (09 Aug 2021 12:00)  HR: 113 (09 Aug 2021 14:00) (105 - 119)  BP: --  BP(mean): --  RR: 25 (09 Aug 2021 14:00) (0 - 38)  SpO2: 100% (09 Aug 2021 14:00) (95% - 100%)     GENERAL:  chronically ill appearing, awake and alert  HEENT:  trach in place, NGT in place  CHEST:  Full & symmetric excursion, no increased effort w/ respirations  HEART:  Regular rhythm & rate  ABDOMEN:  Soft, non-distended, slight tenderness to palpation throughout, dawn tube in place  EXTREMITIES:  no LE  edema  SKIN:  No rash/erythema/ecchymoses/petechiae/wounds/jaundice  NEURO:  Alert, awake, nods answers to questions    LABS:                        8.2    11.31 )-----------( 289      ( 09 Aug 2021 00:33 )             26.8     08-09    134<L>  |  97  |  6<L>  ----------------------------<  83  3.8   |  24  |  0.56    Ca    9.1      09 Aug 2021 00:33  Phos  3.3     08-09  Mg     1.5     08-09    TPro  7.7  /  Alb  3.6  /  TBili  0.6  /  DBili  x   /  AST  25  /  ALT  24  /  AlkPhos  137<H>  08-09    LIVER FUNCTIONS - ( 09 Aug 2021 00:33 )  Alb: 3.6 g/dL / Pro: 7.7 g/dL / ALK PHOS: 137 U/L / ALT: 24 U/L / AST: 25 U/L / GGT: x           PT/INR - ( 07 Aug 2021 16:03 )   PT: 21.0 sec;   INR: 1.80 ratio               Imaging:    < from: Xray Abdomen 1 View PORTABLE -Urgent (Xray Abdomen 1 View PORTABLE -Urgent .) (08.04.21 @ 09:20) >    IMPRESSION:    Nonspecific gas pattern. No radiographic evidence for obstruction. No masses or abnormal calcification. NG tube is in the stomach. The stomach is slightly distended. A left ventricular assisted device is evident.    < end of copied text >           Interval Events:  - GI called back to evaluate for abd pain   - Pt seen and examined at bedside with 1. RN 2. [place]  3. NP from HF/LVAD service. Gave a few different responses to same questions regarding abdominal pain      --> Regarding chronicity of pain: reported "since the surgery" several times -- unclear if referring to perc dawn or LVAD placement as he did say its been "going on for years" at times.      --> Regarding location -- pointed to right perc dawn drain site + LVAD site several times when asked, other times when asked pointed to epigastric region       --> Regarding associated factors: vacillated a few times regarding the relationship with TF (said the TF brought about pains at times, and other times reported no change with TF)      Hospital Medications:  acetaminophen    Suspension .. 650 milliGRAM(s) Oral every 6 hours PRN  aMIOdarone    Tablet 200 milliGRAM(s) Oral daily  cefepime   IVPB 1000 milliGRAM(s) IV Intermittent every 8 hours  chlorhexidine 0.12% Liquid 15 milliLiter(s) Oral Mucosa every 12 hours  chlorhexidine 2% Cloths 1 Application(s) Topical <User Schedule>  clonazePAM  Tablet 0.5 milliGRAM(s) Oral at bedtime  heparin   Injectable 5000 Unit(s) SubCutaneous every 8 hours  insulin lispro (ADMELOG) corrective regimen sliding scale   SubCutaneous every 4 hours  metoclopramide Injectable 10 milliGRAM(s) IV Push every 8 hours  metroNIDAZOLE  IVPB 500 milliGRAM(s) IV Intermittent every 8 hours  octreotide  Injectable 50 MICROGram(s) IV Push every 8 hours  ondansetron Injectable 4 milliGRAM(s) IV Push every 6 hours PRN  pantoprazole  Injectable 40 milliGRAM(s) IV Push every 12 hours  polyethylene glycol 3350 17 Gram(s) Oral daily  senna 2 Tablet(s) Oral at bedtime  simethicone 80 milliGRAM(s) Chew every 8 hours PRN  sodium chloride 0.9%. 1000 milliLiter(s) IV Continuous <Continuous>  vancomycin    Solution 125 milliGRAM(s) Oral every 12 hours      PMHX/PSHX:  No pertinent past medical history    CHF (congestive heart failure)    CAD (coronary artery disease)    Depression    Pleural effusion    History of 2019 novel coronavirus disease (COVID-19)    Hemorrhoids    Bleeding hemorrhoids    Peripheral arterial disease    Claudication    BPH with urinary obstruction    ACC/AHA stage D systolic heart failure    Anticoagulation goal of INR 2.0 to 2.5    Falls    Clavicle fracture    CKD (chronic kidney disease), stage III    Iron deficiency anemia    H/O epistaxis    Vertigo    GI bleed    No significant past surgical history    S/P TVR (tricuspid valve repair)    S/P ventricular assist device    S/P endoscopy            ROS:     General:  No weight loss, fevers, chills, night sweats, fatigue   Eyes:  No vision changes  ENT:  No sore throat, pain, runny nose  CV:  No chest pain, palpitations, dizziness   Resp:  No SOB, cough, wheezing  GI:  See HPI  :  No burning with urination, hematuria  Muscle:  No pain, weakness  Neuro:  No weakness/tingling, memory problems  Psych:  No fatigue, insomnia, mood problems, depression  Heme:  No easy bruisability  Skin:  No rash, edema      PHYSICAL EXAM:    Vital Signs Last 24 Hrs  T(C): 36.9 (09 Aug 2021 12:00), Max: 36.9 (09 Aug 2021 12:00)  T(F): 98.5 (09 Aug 2021 12:00), Max: 98.5 (09 Aug 2021 12:00)  HR: 113 (09 Aug 2021 14:00) (105 - 119)  BP: --  BP(mean): --  RR: 25 (09 Aug 2021 14:00) (0 - 38)  SpO2: 100% (09 Aug 2021 14:00) (95% - 100%)     GENERAL:  chronically ill appearing, awake and alert  HEENT:  trach in place, NGT in place  CHEST:  Full & symmetric excursion, no increased effort w/ respirations  HEART:  Regular rhythm & rate  ABDOMEN:  Soft, non-distended, slight tenderness to palpation throughout, dawn tube in place  EXTREMITIES:  no LE  edema  SKIN:  No rash/erythema/ecchymoses/petechiae/wounds/jaundice  NEURO:  Alert, awake, nods answers to questions    LABS:                        8.2    11.31 )-----------( 289      ( 09 Aug 2021 00:33 )             26.8     08-09    134<L>  |  97  |  6<L>  ----------------------------<  83  3.8   |  24  |  0.56    Ca    9.1      09 Aug 2021 00:33  Phos  3.3     08-09  Mg     1.5     08-09    TPro  7.7  /  Alb  3.6  /  TBili  0.6  /  DBili  x   /  AST  25  /  ALT  24  /  AlkPhos  137<H>  08-09    LIVER FUNCTIONS - ( 09 Aug 2021 00:33 )  Alb: 3.6 g/dL / Pro: 7.7 g/dL / ALK PHOS: 137 U/L / ALT: 24 U/L / AST: 25 U/L / GGT: x           PT/INR - ( 07 Aug 2021 16:03 )   PT: 21.0 sec;   INR: 1.80 ratio               Imaging:    < from: Xray Abdomen 1 View PORTABLE -Urgent (Xray Abdomen 1 View PORTABLE -Urgent .) (08.04.21 @ 09:20) >    IMPRESSION:    Nonspecific gas pattern. No radiographic evidence for obstruction. No masses or abnormal calcification. NG tube is in the stomach. The stomach is slightly distended. A left ventricular assisted device is evident.    < end of copied text >           Interval Events:  - GI called back to evaluate for abd pain   - Pt seen and examined at bedside with 1. RN 2. [place]  3. NP from HF/LVAD service. Gave a few different responses to same questions regarding abdominal pain      --> Regarding chronicity of pain: reported "since the surgery" several times -- unclear if referring to perc dawn or LVAD placement as he did say its been "going on for years" at times.      --> Regarding location -- pointed to right perc dawn drain site + LVAD site several times when asked, other times when asked pointed to epigastric region       --> Regarding associated factors: vacillated a few times regarding the relationship with TF (said the TF brought about pains at times, and other times reported no change with TF), reports no relationship with food prior to hospitalization, denies changes with movement, no further hematochezia, melena, hematemesis, n/v/d       MEDICATIONS  (STANDING):  chlorhexidine 0.12% Liquid 15 milliLiter(s) Oral Mucosa every 12 hours  chlorhexidine 2% Cloths 1 Application(s) Topical <User Schedule>  clonazePAM  Tablet 0.5 milliGRAM(s) Oral at bedtime  dextrose 5%. 1000 milliLiter(s) (25 mL/Hr) IV Continuous <Continuous>  heparin   Injectable 5000 Unit(s) SubCutaneous every 8 hours  insulin lispro (ADMELOG) corrective regimen sliding scale   SubCutaneous every 6 hours  methimazole 10 milliGRAM(s) Oral every 8 hours  metoclopramide Injectable 10 milliGRAM(s) IV Push every 8 hours  mirtazapine 7.5 milliGRAM(s) Oral daily  pantoprazole  Injectable 40 milliGRAM(s) IV Push every 12 hours  polyethylene glycol 3350 17 Gram(s) Oral daily  senna 2 Tablet(s) Oral at bedtime    MEDICATIONS  (PRN):  acetaminophen    Suspension .. 650 milliGRAM(s) Oral every 6 hours PRN Mild Pain (1 - 3)  ondansetron Injectable 4 milliGRAM(s) IV Push every 6 hours PRN Nausea and/or Vomiting      PMHX/PSHX:  No pertinent past medical history    CHF (congestive heart failure)    CAD (coronary artery disease)    Depression    Pleural effusion    History of 2019 novel coronavirus disease (COVID-19)    Hemorrhoids    Bleeding hemorrhoids    Peripheral arterial disease    Claudication    BPH with urinary obstruction    ACC/AHA stage D systolic heart failure    Anticoagulation goal of INR 2.0 to 2.5    Falls    Clavicle fracture    CKD (chronic kidney disease), stage III    Iron deficiency anemia    H/O epistaxis    Vertigo    GI bleed    No significant past surgical history    S/P TVR (tricuspid valve repair)    S/P ventricular assist device    S/P endoscopy            ROS:     General:  No weight loss, fevers, chills, night sweats, fatigue   Eyes:  No vision changes  ENT:  No sore throat, pain, runny nose  CV:  No chest pain, palpitations, dizziness   Resp:  No SOB, cough, wheezing  GI:  See HPI  :  No burning with urination, hematuria  Muscle:  No pain, weakness  Neuro:  No weakness/tingling, memory problems  Psych:  No fatigue, insomnia, mood problems, depression  Heme:  No easy bruisability  Skin:  No rash, edema      PHYSICAL EXAM:    Vital Signs Last 24 Hrs  T(C): 36.9 (09 Aug 2021 12:00), Max: 36.9 (09 Aug 2021 12:00)  T(F): 98.5 (09 Aug 2021 12:00), Max: 98.5 (09 Aug 2021 12:00)  HR: 113 (09 Aug 2021 14:00) (105 - 119)  BP: --  BP(mean): --  RR: 25 (09 Aug 2021 14:00) (0 - 38)  SpO2: 100% (09 Aug 2021 14:00) (95% - 100%)     GENERAL:  chronically ill appearing, awake and alert  HEENT:  trach in place, NGT in place (TF held)   CHEST:  Full & symmetric excursion, no increased effort w/ respirations  HEART:  Regular rhythm & rate  ABDOMEN:  Soft, non-distended, +TTP at perc dawn tube, +TTP at LVAD insertion site (no tenderness otherwise throughout abd, although did mention its "firm" around chest wall surgical site)  EXTREMITIES:  no LE edema  SKIN:  No rash/erythema/ecchymoses/petechiae/wounds/jaundice  NEURO:  Alert, awake, nods answers to questions         LABS:                        8.2    11.31 )-----------( 289      ( 09 Aug 2021 00:33 )             26.8     08-09    134<L>  |  97  |  6<L>  ----------------------------<  83  3.8   |  24  |  0.56    Ca    9.1      09 Aug 2021 00:33  Phos  3.3     08-09  Mg     1.5     08-09    TPro  7.7  /  Alb  3.6  /  TBili  0.6  /  DBili  x   /  AST  25  /  ALT  24  /  AlkPhos  137<H>  08-09    LIVER FUNCTIONS - ( 09 Aug 2021 00:33 )  Alb: 3.6 g/dL / Pro: 7.7 g/dL / ALK PHOS: 137 U/L / ALT: 24 U/L / AST: 25 U/L / GGT: x               Imaging:    < from: CT Abdomen and Pelvis w/ IV Cont (07.28.21 @ 17:44) >  EXAM:  CT ABDOMEN AND PELVIS IC                          EXAM:  CT CHEST IC                            PROCEDURE DATE:  07/28/2021            INTERPRETATION:  CLINICAL INFORMATION: Sepsis.    COMPARISON: CT chest, abdomen and pelvis 6/22/2021    CONTRAST/COMPLICATIONS:  IV Contrast: Omnipaque 350  90 cc administered   10 cc discarded  Oral Contrast: None  Complications: None reported    PROCEDURE:  CT of the Chest, Abdomen and Pelvis was performed.  Sagittal and coronal reformats were performed.    FINDINGS:  CHEST:  LUNGS, PLEURA AND LARGE AIRWAYS: Tracheostomy tube noted. Secretions within the trachea. Emphysema. Trace bilateral pleural effusions with associated partial atelectasis of the lower lobes. Interval resolution of groundglass opacities in the left upper and lower lobes. A left lower lobe consolidation is noted. Interval placement of right-sided pigtail catheter with trace pneumothorax.  VESSELS: Within normal limits.  HEART: Heart size is enlarged. LVAD with partially thrombosed outflow tract as at prior CT. No evidence of fluid collection.  No pericardial effusion. Tricuspid valve replacement.  MEDIASTINUM AND DON: No lymphadenopathy.  CHEST WALL AND LOWER NECK: Sternotomy. Subcutaneous emphysema along right chest wall, postprocedural. Interval placement of right central venous catheter with tip in proximal right atrium.    ABDOMEN AND PELVIS:  LIVER: Within normal limits.  BILE DUCTS: Normal caliber.  GALLBLADDER: Within normal limits.  SPLEEN: Within normal limits.  PANCREAS: Within normal limits.  ADRENALS: Within normal limits.  KIDNEYS/URETERS: Within normal limits.    BLADDER: Landrum catheter.  REPRODUCTIVE ORGANS: Within normal limits.    BOWEL: Enteric tube with tip in stomach. No bowel obstruction.  PERITONEUM: Small volume abdominopelvic ascites, increased since 6/22/2021.  VESSELS: Vascular calcification of the aorta and its branches.  RETROPERITONEUM/LYMPH NODES: No lymphadenopathy.  ABDOMINAL WALL: Soft tissue swelling.  BONES: Degenerative changes.    IMPRESSION:    Left lower lobe consolidation representing a mix of atelectasis and infection.    No infectious etiology within the abdomen and pelvis.    --- End of Report ---    < end of copied text >

## 2021-08-09 NOTE — PROGRESS NOTE ADULT - SUBJECTIVE AND OBJECTIVE BOX
Chief complaint  Patient is a 66y old  Male who presents with a chief complaint of Anemia, supratherapeutic INR, Dark Stools (09 Aug 2021 14:03)   Review of systems  Patient appears comfortable, no hypoglycemias.    Labs and Fingersticks  CAPILLARY BLOOD GLUCOSE      POCT Blood Glucose.: 84 mg/dL (09 Aug 2021 11:50)  POCT Blood Glucose.: 86 mg/dL (09 Aug 2021 05:24)  POCT Blood Glucose.: 87 mg/dL (08 Aug 2021 23:52)  POCT Blood Glucose.: 87 mg/dL (08 Aug 2021 17:02)      Anion Gap, Serum: 13 (08-09 @ 00:33)  Anion Gap, Serum: 14 (08-08 @ 00:48)      Calcium, Total Serum: 9.1 (08-09 @ 00:33)  Calcium, Total Serum: 9.5 (08-08 @ 00:48)  Albumin, Serum: 3.6 (08-09 @ 00:33)  Albumin, Serum: 3.2 *L* (08-08 @ 00:48)    Alanine Aminotransferase (ALT/SGPT): 24 (08-09 @ 00:33)  Alanine Aminotransferase (ALT/SGPT): 22 (08-08 @ 00:48)  Alkaline Phosphatase, Serum: 137 *H* (08-09 @ 00:33)  Alkaline Phosphatase, Serum: 136 *H* (08-08 @ 00:48)  Aspartate Aminotransferase (AST/SGOT): 25 (08-09 @ 00:33)  Aspartate Aminotransferase (AST/SGOT): 19 (08-08 @ 00:48)        08-09    134<L>  |  97  |  6<L>  ----------------------------<  83  3.8   |  24  |  0.56    Ca    9.1      09 Aug 2021 00:33  Phos  3.3     08-09  Mg     1.5     08-09    TPro  7.7  /  Alb  3.6  /  TBili  0.6  /  DBili  x   /  AST  25  /  ALT  24  /  AlkPhos  137<H>  08-09                        8.2    11.31 )-----------( 289      ( 09 Aug 2021 00:33 )             26.8     Medications  MEDICATIONS  (STANDING):  chlorhexidine 0.12% Liquid 15 milliLiter(s) Oral Mucosa every 12 hours  chlorhexidine 2% Cloths 1 Application(s) Topical <User Schedule>  clonazePAM  Tablet 0.5 milliGRAM(s) Oral at bedtime  dextrose 5%. 1000 milliLiter(s) (25 mL/Hr) IV Continuous <Continuous>  heparin   Injectable 5000 Unit(s) SubCutaneous every 8 hours  insulin lispro (ADMELOG) corrective regimen sliding scale   SubCutaneous every 6 hours  methimazole 10 milliGRAM(s) Oral every 8 hours  metoclopramide Injectable 10 milliGRAM(s) IV Push every 8 hours  mirtazapine 7.5 milliGRAM(s) Oral daily  pantoprazole  Injectable 40 milliGRAM(s) IV Push every 12 hours  polyethylene glycol 3350 17 Gram(s) Oral daily  senna 2 Tablet(s) Oral at bedtime      Physical Exam  General: Patient comfortable in bed  Vital Signs Last 12 Hrs  T(F): 98.5 (08-09-21 @ 12:00), Max: 98.5 (08-09-21 @ 12:00)  HR: 113 (08-09-21 @ 14:00) (107 - 118)  BP: --  BP(mean): --  RR: 25 (08-09-21 @ 14:00) (13 - 36)  SpO2: 100% (08-09-21 @ 14:00) (95% - 100%)  Neck: No palpable thyroid nodules.  CVS: S1S2, No murmurs  Respiratory: No wheezing, no crepitations  GI: Abdomen soft, bowel sounds positive  Musculoskeletal:  edema lower extremities.   Skin: No skin rashes, no ecchymosis    Diagnostics    US Thyroid: Routine   Indication: hyperthyroidism  Transport: Stret[facility]  w/ Monitor  Provider's Contact #: [phone] (08-08 @ 16:13)  TSH Receptor Antibody: AM Sched. Collection: 09-Aug-2021 06:00 (08-08 @ 13:31)  Thyroperoxidase Antibody: AM Sched. Collection: 09-Aug-2021 06:00 (08-08 @ 13:31)           Chief complaint  Patient is a 66y old  Male who presents with a chief complaint of Anemia, supratherapeutic INR, Dark Stools (09 Aug 2021 14:03)   Review of systems  Patient appears comfortable, no hypoglycemias.    Labs and Fingersticks  CAPILLARY BLOOD GLUCOSE    POCT Blood Glucose.: 84 mg/dL (09 Aug 2021 11:50)  POCT Blood Glucose.: 86 mg/dL (09 Aug 2021 05:24)  POCT Blood Glucose.: 87 mg/dL (08 Aug 2021 23:52)  POCT Blood Glucose.: 87 mg/dL (08 Aug 2021 17:02)      Anion Gap, Serum: 13 (08-09 @ 00:33)  Anion Gap, Serum: 14 (08-08 @ 00:48)      Calcium, Total Serum: 9.1 (08-09 @ 00:33)  Calcium, Total Serum: 9.5 (08-08 @ 00:48)  Albumin, Serum: 3.6 (08-09 @ 00:33)  Albumin, Serum: 3.2 *L* (08-08 @ 00:48)    Alanine Aminotransferase (ALT/SGPT): 24 (08-09 @ 00:33)  Alanine Aminotransferase (ALT/SGPT): 22 (08-08 @ 00:48)  Alkaline Phosphatase, Serum: 137 *H* (08-09 @ 00:33)  Alkaline Phosphatase, Serum: 136 *H* (08-08 @ 00:48)  Aspartate Aminotransferase (AST/SGOT): 25 (08-09 @ 00:33)  Aspartate Aminotransferase (AST/SGOT): 19 (08-08 @ 00:48)        08-09    134<L>  |  97  |  6<L>  ----------------------------<  83  3.8   |  24  |  0.56    Ca    9.1      09 Aug 2021 00:33  Phos  3.3     08-09  Mg     1.5     08-09    TPro  7.7  /  Alb  3.6  /  TBili  0.6  /  DBili  x   /  AST  25  /  ALT  24  /  AlkPhos  137<H>  08-09                        8.2    11.31 )-----------( 289      ( 09 Aug 2021 00:33 )             26.8     Medications  MEDICATIONS  (STANDING):  chlorhexidine 0.12% Liquid 15 milliLiter(s) Oral Mucosa every 12 hours  chlorhexidine 2% Cloths 1 Application(s) Topical <User Schedule>  clonazePAM  Tablet 0.5 milliGRAM(s) Oral at bedtime  dextrose 5%. 1000 milliLiter(s) (25 mL/Hr) IV Continuous <Continuous>  heparin   Injectable 5000 Unit(s) SubCutaneous every 8 hours  insulin lispro (ADMELOG) corrective regimen sliding scale   SubCutaneous every 6 hours  methimazole 10 milliGRAM(s) Oral every 8 hours  metoclopramide Injectable 10 milliGRAM(s) IV Push every 8 hours  mirtazapine 7.5 milliGRAM(s) Oral daily  pantoprazole  Injectable 40 milliGRAM(s) IV Push every 12 hours  polyethylene glycol 3350 17 Gram(s) Oral daily  senna 2 Tablet(s) Oral at bedtime      Physical Exam  General: Patient comfortable in bed  Vital Signs Last 12 Hrs  T(F): 98.5 (08-09-21 @ 12:00), Max: 98.5 (08-09-21 @ 12:00)  HR: 113 (08-09-21 @ 14:00) (107 - 118)  BP: --  BP(mean): --  RR: 25 (08-09-21 @ 14:00) (13 - 36)  SpO2: 100% (08-09-21 @ 14:00) (95% - 100%)  Neck: No palpable thyroid nodules.  CVS: S1S2, No murmurs  Respiratory: No wheezing, no crepitations  GI: Abdomen soft, bowel sounds positive  Musculoskeletal:  edema lower extremities.   Skin: No skin rashes, no ecchymosis    Diagnostics    US Thyroid: Routine   Indication: hyperthyroidism  Transport: Stret[facility]  w/ Monitor  Provider's Contact #: [phone] (08-08 @ 16:13)  TSH Receptor Antibody: AM Sched. Collection: 09-Aug-2021 06:00 (08-08 @ 13:31)  Thyroperoxidase Antibody: AM Sched. Collection: 09-Aug-2021 06:00 (08-08 @ 13:31)

## 2021-08-09 NOTE — PROGRESS NOTE ADULT - ASSESSMENT
66 years old male with a history of stage D NICM s/p HM2 on 9/2017 as DT (due to severe PAD) with TV ring, prior COVID-19 infection 4/2020, recurrent syncope post LVAD s/p ILR, admitted on 6/14/21with symptomatic anemia with Hgb 4.5 in setting of INR 8.8 without hemodynamic instability. He was transfused and underwent VCE which showed active bleeding in the mid small bowel but subsequent enteroscopy 6/15 did not reveal any active bleeding. He acutely decompensated after procedure with fever/hypertension, low flow alarms, and pulmonary infiltrate with hypoxia requiring intubation from probable aspiration PNA. Course notable for inability to wean ventilator with persistent secretions for which he underwent tracheostomy. He developed C. diff colitis and is on vancomycin taper. He became vent dependent and required pressor support. BCx showed serratia on 7/26, which correlates to his sputum culture on 7/7 and 7/27. The source is most likely from pneumonia or cholecystitis. Transaminitis could be secondary to acalculous cholecystitis. Stenotrophomonas is likely colonization and does not need to be treated unless patient decompensated on current treatment or having increased ventilation requirement. Patient is receiving Cefepime and Flagyl for serratia bacteremia for a total of 14 days. He is now on 40% FiO2 trach collar with significant clinical improvement.      IMPRESSION:  Resolved septic shock secondary to high grade Serratia bacteremia, secondary to cholecystitis  Tension pneumothorax s/p right chest tube  Resolved transaminitis, likely acalculous cholecystitis s/p perc dawn tube 7/30  C. diff colitis on PO vancomycin  ICM s/p LVAD  BCx serratia 7/26, NGTD since 7/27  Sputum culture serratia 7/7, serratia and stenotrophomonas 7/27, stenotrophomonas 8/1  Biliary Cx negative (collected after abx started)  CT consolidation on left lower lobe, improved from CT in June 2021  No cholangitis  No signs of drive line infection    R subclavian TLC 8/1      RECOMMENDATIONS:  - Continue with IV cefepime 1gm q8hrs and IV Flagyl 500mg q8hrs for a total of 14 days for GNR bacteremia (ends on 8/9)  - Hold off on treating Stenotrophomonas for now unless he deteriorates on current treatment or with signs of worsening PNA  - Continue PO vancomycin 125mg q12hrs while on board-spectrum antibiotics and stop 48 hours after abx course  - Follow up with surgery for the duration of percutaneous cholecystostomy tube  - D/C TLC if patient is hemodynamically stable  - Aggressive pulmonary toileting  - Will continue to follow  - Guarded prognosis      Case discussed with attending and primary team        Marisa Hall DO  PGY4 ID fellow  Pager: [phone]  [facility] pager ID: [phone]  Please contact me via page or text through Microsoft Teams  If after 5PM or on weekends, please call [phone]     66 years old male with a history of stage D NICM s/p HM2 on 9/2017 as DT (due to severe PAD) with TV ring, prior COVID-19 infection 4/2020, recurrent syncope post LVAD s/p ILR, admitted on 6/14/21with symptomatic anemia with Hgb 4.5 in setting of INR 8.8 without hemodynamic instability. He was transfused and underwent VCE which showed active bleeding in the mid small bowel but subsequent enteroscopy 6/15 did not reveal any active bleeding. He acutely decompensated after procedure with fever/hypertension, low flow alarms, and pulmonary infiltrate with hypoxia requiring intubation from probable aspiration PNA. Course notable for inability to wean ventilator with persistent secretions for which he underwent tracheostomy. He developed C. diff colitis and is on vancomycin taper. He became vent dependent and required pressor support. BCx showed serratia on 7/26, which correlates to his sputum culture on 7/7 and 7/27. The source is most likely from pneumonia or cholecystitis. Transaminitis could be secondary to acalculous cholecystitis. Stenotrophomonas is likely colonization and does not need to be treated unless patient decompensated on current treatment or having increased ventilation requirement. Patient is receiving Cefepime and Flagyl for serratia bacteremia for a total of 14 days. He is now on 40% FiO2 trach collar with significant clinical improvement.      IMPRESSION:  Resolved septic shock secondary to high grade Serratia bacteremia, secondary to cholecystitis  Tension pneumothorax s/p right chest tube  Resolved transaminitis, likely acalculous cholecystitis s/p perc dawn tube 7/30  C. diff colitis on PO vancomycin  ICM s/p LVAD  BCx serratia 7/26, NGTD since 7/27  Sputum culture serratia 7/7, serratia and stenotrophomonas 7/27, stenotrophomonas 8/1  Biliary Cx negative (collected after abx started)  CT consolidation on left lower lobe, improved from CT in June 2021  No cholangitis  No signs of drive line infection    R subclavian TLC 8/1      RECOMMENDATIONS:  - Complete IV antibiotics for serratia bacteremia  - Hold off on treating Stenotrophomonas for now unless he deteriorates on current treatment or with signs of worsening PNA  - Continue PO vancomycin 125mg q12hrs while on board-spectrum antibiotics and stop 48 hours after abx course  - Follow up with surgery for the duration of percutaneous cholecystostomy tube  - tolerating NG feeds    Morris Prater MD  [phone]  After 5pm/weekends [phone]

## 2021-08-09 NOTE — PROGRESS NOTE ADULT - ATTENDING COMMENTS
Patient seen and examined, agree with above. Patient reports abdominal pain but also points to multiple locations including the sites of LVAD and percutaneous cholecystostomy. Pain present for years per patient, unclear if it's really related to food. Has had negative CT angio in 2020 showing normal mesenteric vessels, and pan-endoscopy done recently shows no acute pathology. Would place on PPI, resume feeds as patient is not agreeable, and monitor for now.

## 2021-08-09 NOTE — PROGRESS NOTE ADULT - NSICDXPILOT_GEN_ALL_CORE
[place]
Alexis
[place]end[place]
Capron
Chester[place]
Ch[place]as[place]
Clyman
[place]
Drif[place]ola
[place]
Henderson
Hoped[place]
Inver[place]
Ki[place]
L[place]as[place]an[place]
L[place]
Malone
[place]
O[place]
Par[place]
S[place]
Shel[place]
S[place]
Tapp[place]
[facility]
Troutdale
Vivian
Weir
[place]
West[place]
Angora
Bear
Birdseye
Brooklyn
Burnham
Cyril
Dawson
[place]
Grand [place]
Helena
Hines
Jasper
Joseph
Justice
Kempton
Kirkville
Latexo
Leggett
Lisbon
Madison
Nabb
Nassau
[place]
Ocala
Pflug[place]
Rehrers[place]
Saint [place]
Schenect[place]
Tiger
Vacaville
Whittier
Yeso
Ardmore
Auburn
Bolton
Brookhaven
Brundidge
Butler
Cathlamet
Colona
Fairwater
Fenton
Fogelsville
Forest[place]
Grindstone
Hamilton
Hampstead
Honeydew
Howardsville
Jackson[place]
Keedysville
Kiln
Kirk[place]
K[place]
Lookout [place]
Mendocino
Milton
Monroe
Muskog[place]
Northfield
Oakville
Ormond [place]
Owasso
Owens[place]
Red River
Rixford
Ruby
Saint [place]
State [place]
Sturgeon
Sullivan
Sullivan
Sun
Theodosia
Tunnelton
Twin [place]
Vandiver
Waterford
West [place]
Woodbine
Woolwine
Arcadia
Church [place]
Dalton
Darrouzett
Dunstable
McGuffey
[place]
Port [place]
Preston
Sayner
Shadyside
South [place]
Stockton
Symsonia
Tappan
Virginia Beach
Walkerton
Whitewood
Wynnewood
Abilene
Altamonte Springs
Amherst
Bittinger
Bland
Bland
Bonita Springs
Buckeye
Carter
Ely
High Ridge
Ingalls
Jarbidge
Kanab
Lake Worth
Mecosta
Mesquite
Milwaukee
Nemo
Niangua
San Clemente
Scotland
Serafina
Sodus
Toledo
Topeka
Tyrone
Wooster
Goreville
Hallsboro
Hazlet
Mentor
Dearborn
Jacksonville
Coalton
Albany
Bremerton
Guatay
Huntsville
Franklin
Islip
Des Lacs
Ayden
Columbia
Slatyfork
Berwick
Canyon City
Vincent
Accoville
Hooper Bay
Hazel
Latimer
Roaring River
Rochester
Uvalde
Success
Broadview Heights
Carpinteria
Lenox
Manton
Roscoe
Glendive
Obernburg
Adrian
New Martins[place]
Wasola
Congerville
Butler
[place]
Chelan
Owenton

## 2021-08-09 NOTE — PROGRESS NOTE ADULT - SUBJECTIVE AND OBJECTIVE BOX
Kiana Wheat MD  Cardiology Fellow  [phone]  All Cardiology service information can be found 24/7 on [email or web address], password: [email or web address]    Patient seen and examined at bedside.    Overnight Events:     Review Of Systems: No chest pain, shortness of breath, or palpitations            Current Meds:  acetaminophen    Suspension .. 650 milliGRAM(s) Oral every 6 hours PRN  chlorhexidine 0.12% Liquid 15 milliLiter(s) Oral Mucosa every 12 hours  chlorhexidine 2% Cloths 1 Application(s) Topical <User Schedule>  clonazePAM  Tablet 0.5 milliGRAM(s) Oral at bedtime  dextrose 5%. 1000 milliLiter(s) IV Continuous <Continuous>  heparin   Injectable 5000 Unit(s) SubCutaneous every 8 hours  insulin lispro (ADMELOG) corrective regimen sliding scale   SubCutaneous every 6 hours  magnesium sulfate  IVPB 2 Gram(s) IV Intermittent once  metoclopramide Injectable 10 milliGRAM(s) IV Push every 8 hours  mirtazapine 7.5 milliGRAM(s) Oral daily  ondansetron Injectable 4 milliGRAM(s) IV Push every 6 hours PRN  pantoprazole  Injectable 40 milliGRAM(s) IV Push every 12 hours  polyethylene glycol 3350 17 Gram(s) Oral daily  potassium chloride  10 mEq/50 mL IVPB 10 milliEquivalent(s) IV Intermittent every 1 hour  senna 2 Tablet(s) Oral at bedtime      Vitals:  T(F): 97.9 (08-09), Max: 98.1 (08-09)  HR: 113 (08-09) (105 - 119)  BP: --  RR: 24 (08-09)  SpO2: 100% (08-09)  I&O's Summary    08 Aug 2021 07:01  -  09 Aug 2021 07:00  --------------------------------------------------------  IN: 740 mL / OUT: 1210 mL / NET: -470 mL    09 Aug 2021 07:01  -  09 Aug 2021 08:43  --------------------------------------------------------  IN: 150 mL / OUT: 0 mL / NET: 150 mL        Physical Exam:  Appearance: No acute distress; well appearing  Eyes: PERRL, EOMI, pink conjunctiva  HEENT: Normal oral mucosa  Cardiovascular: RRR, S1, S2, no murmurs, rubs, or gallops; no edema; no JVD  Respiratory: Clear to auscultation bilaterally  Gastrointestinal: soft, non-tender, non-distended with normal bowel sounds  Musculoskeletal: No clubbing; no joint deformity   Neurologic: Non-focal  Lymphatic: No lymphadenopathy  Psychiatry: AAOx3, mood & affect appropriate  Skin: No rashes, ecchymoses, or cyanosis                          8.2    11.31 )-----------( 289      ( 09 Aug 2021 00:33 )             26.8     08-09    134<L>  |  97  |  6<L>  ----------------------------<  83  3.8   |  24  |  0.56    Ca    9.1      09 Aug 2021 00:33  Phos  3.3     08-09  Mg     1.5     08-09    TPro  7.7  /  Alb  3.6  /  TBili  0.6  /  DBili  x   /  AST  25  /  ALT  24  /  AlkPhos  137<H>  08-09    PT/INR - ( 07 Aug 2021 16:03 )   PT: 21.0 sec;   INR: 1.80 ratio                       New ECG(s): Personally reviewed    Echo:    Stress Testing:     Cath:    Imaging:    Interpretation of Telemetry:

## 2021-08-09 NOTE — PROGRESS NOTE ADULT - PROBLEM SELECTOR PLAN 1
Thyroid antibody labs and thyroid US pending.. Will start Methimazole 10mg TID for now.   Suggest beta blocker for tachycardia.  Will continue monitoring and FU.  Discussed plan with primary team.

## 2021-08-10 NOTE — PROGRESS NOTE ADULT - SUBJECTIVE AND OBJECTIVE BOX
Subjective: Abdominal pain improved today and he is tolerating feeds.     Medications:  acetaminophen    Suspension .. 650 milliGRAM(s) Oral every 6 hours PRN  chlorhexidine 0.12% Liquid 15 milliLiter(s) Oral Mucosa every 12 hours  clonazePAM  Tablet 0.5 milliGRAM(s) Oral at bedtime  heparin   Injectable 5000 Unit(s) SubCutaneous every 8 hours  insulin lispro (ADMELOG) corrective regimen sliding scale   SubCutaneous every 6 hours  methimazole 10 milliGRAM(s) Oral every 8 hours  metoclopramide Injectable 10 milliGRAM(s) IV Push every 8 hours  mirtazapine 7.5 milliGRAM(s) Oral daily  ondansetron Injectable 4 milliGRAM(s) IV Push every 6 hours PRN  pantoprazole  Injectable 40 milliGRAM(s) IV Push every 12 hours  polyethylene glycol 3350 17 Gram(s) Oral daily  propranolol 20 milliGRAM(s) Oral every 8 hours  senna 2 Tablet(s) Oral at bedtime      Vitals:  Vital Signs Last 24 Hrs  T(C): 37.1 (10 Aug 2021 08:00), Max: 37.6 (09 Aug 2021 20:00)  T(F): 98.8 (10 Aug 2021 08:00), Max: 99.6 (09 Aug 2021 20:00)  HR: 113 (10 Aug 2021 11:00) (110 - 133)  BP Maps 76-96  RR: 37 (10 Aug 2021 11:00) (3 - 42)  SpO2: 98% (10 Aug 2021 11:00) (88% - 100%)    Daily     Daily Weight in k.4 (10 Aug 2021 00:00)      I&O's Summary    09 Aug 2021 07:01  -  10 Aug 2021 07:00  --------------------------------------------------------  IN: 1370 mL / OUT: 5 mL / NET: -655 mL    10 Aug 2021 07:01  -  10 Aug 2021 12:45  --------------------------------------------------------  IN: 310 mL / OUT: 200 mL / NET: 110 mL        Physical Exam:     General: No distress. Comfortable.  Neck: Neck supple. JVP not elevated. No masses  Chest: Clear to auscultation bilaterally  CV: Typical LVAD sounds. No palpable pulses  Abdomen: Soft, non-distended, non-tender  Extremities: no LE edema, warm and well perfused  Skin: No rashes or skin breakdown  Neurology: Alert and oriented times three. Sensation intact  Psych: Affect normal    LVAD Interrogation: Heart Mate 3  Speed: 9200  Flow: 5.9  Power: 5.9  PI: 6.1  Event: No events  No programming changes were made    Labs:                        8.8    8.36  )-----------( 297      ( 10 Aug 2021 00:51 )             28.6     08-10    131<L>  |  93<L>  |  4<L>  ----------------------------<  121<H>  3.9   |  25  |  0.54    Ca    9.7      10 Aug 2021 00:51  Phos  3.5     08-10  Mg     1.7     08-10    TPro  7.8  /  Alb  3.5  /  TBili  0.6  /  DBili  x   /  AST  33  /  ALT  30  /  AlkPhos  140<H>  08-10              Lactate Dehydrogenase, Serum: 232 U/L (08-10 @ 00:51)  Lactate Dehydrogenase, Serum: 246 U/L ( @ 00:33)  Lactate Dehydrogenase, Serum: 200 U/L ( @ 00:48)       Subjective: Abdominal pain improved today and he is tolerating feeds. Found to have hyperthyroidism, start on methimazole.    Medications:  acetaminophen    Suspension .. 650 milliGRAM(s) Oral every 6 hours PRN  chlorhexidine 0.12% Liquid 15 milliLiter(s) Oral Mucosa every 12 hours  clonazePAM  Tablet 0.5 milliGRAM(s) Oral at bedtime  heparin   Injectable 5000 Unit(s) SubCutaneous every 8 hours  insulin lispro (ADMELOG) corrective regimen sliding scale   SubCutaneous every 6 hours  methimazole 10 milliGRAM(s) Oral every 8 hours  metoclopramide Injectable 10 milliGRAM(s) IV Push every 8 hours  mirtazapine 7.5 milliGRAM(s) Oral daily  ondansetron Injectable 4 milliGRAM(s) IV Push every 6 hours PRN  pantoprazole  Injectable 40 milliGRAM(s) IV Push every 12 hours  polyethylene glycol 3350 17 Gram(s) Oral daily  propranolol 20 milliGRAM(s) Oral every 8 hours  senna 2 Tablet(s) Oral at bedtime      Vitals:  Vital Signs Last 24 Hrs  T(C): 37.1 (10 Aug 2021 08:00), Max: 37.6 (09 Aug 2021 20:00)  T(F): 98.8 (10 Aug 2021 08:00), Max: 99.6 (09 Aug 2021 20:00)  HR: 113 (10 Aug 2021 11:00) (110 - 133)  BP Maps 76-96  RR: 37 (10 Aug 2021 11:00) (3 - 42)  SpO2: 98% (10 Aug 2021 11:00) (88% - 100%)    Daily     Daily Weight in k.4 (10 Aug 2021 00:00)      I&O's Summary    09 Aug 2021 07:01  -  10 Aug 2021 07:00  --------------------------------------------------------  IN: 1370 mL / OUT: 2025 mL / NET: -655 mL    10 Aug 2021 07:01  -  10 Aug 2021 12:45  --------------------------------------------------------  IN: 310 mL / OUT: 200 mL / NET: 110 mL        Physical Exam:     General: No distress. Comfortable.  Neck: Neck supple. JVP not elevated. No masses  Chest: Clear to auscultation bilaterally  CV: Typical LVAD sounds. No palpable pulses  Abdomen: Soft, non-distended, non-tender  Extremities: no LE edema, warm and well perfused  Skin: No rashes or skin breakdown  Neurology: Alert and oriented times three. Sensation intact  Psych: Affect normal    LVAD Interrogation: Heart Mate 3  Speed: 9200  Flow: 5.9  Power: 5.9  PI: 6.1  Event: No events  No programming changes were made    Labs:                        8.8    8.36  )-----------( 297      ( 10 Aug 2021 00:51 )             28.6     08-10    131<L>  |  93<L>  |  4<L>  ----------------------------<  121<H>  3.9   |  25  |  0.54    Ca    9.7      10 Aug 2021 00:51  Phos  3.5     08-10  Mg     1.7     08-10    TPro  7.8  /  Alb  3.5  /  TBili  0.6  /  DBili  x   /  AST  33  /  ALT  30  /  AlkPhos  140<H>  08-10              Lactate Dehydrogenase, Serum: 232 U/L (08-10 @ 00:51)  Lactate Dehydrogenase, Serum: 246 U/L ( @ 00:33)  Lactate Dehydrogenase, Serum: 200 U/L ( @ 00:48)       Subjective: Abdominal pain improved today and he is tolerating feeds. Found to have hyperthyroidism, start on methimazole.    Medications:  acetaminophen    Suspension .. 650 milliGRAM(s) Oral every 6 hours PRN  chlorhexidine 0.12% Liquid 15 milliLiter(s) Oral Mucosa every 12 hours  clonazePAM  Tablet 0.5 milliGRAM(s) Oral at bedtime  heparin   Injectable 5000 Unit(s) SubCutaneous every 8 hours  insulin lispro (ADMELOG) corrective regimen sliding scale   SubCutaneous every 6 hours  methimazole 10 milliGRAM(s) Oral every 8 hours  metoclopramide Injectable 10 milliGRAM(s) IV Push every 8 hours  mirtazapine 7.5 milliGRAM(s) Oral daily  ondansetron Injectable 4 milliGRAM(s) IV Push every 6 hours PRN  pantoprazole  Injectable 40 milliGRAM(s) IV Push every 12 hours  polyethylene glycol 3350 17 Gram(s) Oral daily  propranolol 20 milliGRAM(s) Oral every 8 hours  senna 2 Tablet(s) Oral at bedtime      Vitals:  Vital Signs Last 24 Hrs  T(C): 37.1 (10 Aug 2021 08:00), Max: 37.6 (09 Aug 2021 20:00)  T(F): 98.8 (10 Aug 2021 08:00), Max: 99.6 (09 Aug 2021 20:00)  HR: 113 (10 Aug 2021 11:00) (110 - 133)  BP Maps 76-96  RR: 37 (10 Aug 2021 11:00) (3 - 42)  SpO2: 98% (10 Aug 2021 11:00) (88% - 100%)    Daily     Daily Weight in k.4 (10 Aug 2021 00:00)      I&O's Summary    09 Aug 2021 07:01  -  10 Aug 2021 07:00  --------------------------------------------------------  IN: 1370 mL / OUT: 2025 mL / NET: -655 mL    10 Aug 2021 07:01  -  10 Aug 2021 12:45  --------------------------------------------------------  IN: 310 mL / OUT: 200 mL / NET: 110 mL        Physical Exam:     General: No distress. Comfortable.  Neck: Neck supple. JVP not elevated. No masses  Chest: Clear to auscultation bilaterally  CV: Typical LVAD sounds. No palpable pulses  Abdomen: Soft, non-distended, non-tender  Extremities: no LE edema, warm and well perfused  Skin: No rashes or skin breakdown  Neurology: Alert and oriented times three. Sensation intact  Psych: Affect normal    LVAD Interrogation: Heart Mate 3  Speed: 9200  Flow: 5.9  Power: 5.9  PI: 6.1  Event: No events  No programming changes were made    Labs:                        8.8    8.36  )-----------( 297      ( 10 Aug 2021 00:51 )             28.6     08-10    131<L>  |  93<L>  |  4<L>  ----------------------------<  121<H>  3.9   |  25  |  0.54    Ca    9.7      10 Aug 2021 00:51  Phos  3.5     08-10  Mg     1.7     08-10    TPro  7.8  /  Alb  3.5  /  TBili  0.6  /  DBili  x   /  AST  33  /  ALT  30  /  AlkPhos  140<H>  08-10      Lactate Dehydrogenase, Serum: 232 U/L (08-10 @ 00:51)  Lactate Dehydrogenase, Serum: 246 U/L ( @ 00:33)  Lactate Dehydrogenase, Serum: 200 U/L ( @ 00:48)

## 2021-08-10 NOTE — PROGRESS NOTE ADULT - PROBLEM SELECTOR PLAN 7
-Appr ID recs; prior Cdiff colitis  -Contact precautions  -off vanco PO taper -methimazole 10mg q8  -will start BB, preferable coreg but amenable to propranolol  -endo following

## 2021-08-10 NOTE — PROGRESS NOTE ADULT - ASSESSMENT
66 years old male with a history of stage D NICM s/p HM2 on 9/2017 as DT (due to severe PAD) with TV ring, prior COVID-19 infection 4/2020, recurrent syncope post LVAD s/p ILR, admitted on 6/14/21with symptomatic anemia with Hgb 4.5 in setting of INR 8.8 without hemodynamic instability. He was transfused and underwent VCE which showed active bleeding in the mid small bowel but subsequent enteroscopy 6/15 did not reveal any active bleeding. He acutely decompensated after procedure with fever/hypertension, low flow alarms, and pulmonary infiltrate with hypoxia requiring intubation from probable aspiration PNA. Course notable for inability to wean ventilator with persistent secretions for which he underwent tracheostomy. He developed C. diff colitis and is on vancomycin taper. He became vent dependent and required pressor support. BCx showed serratia on 7/26, which correlates to his sputum culture on 7/7 and 7/27. The source is most likely from pneumonia or cholecystitis. Transaminitis could be secondary to acalculous cholecystitis. Stenotrophomonas is likely colonization and does not need to be treated unless patient decompensated on current treatment or having increased ventilation requirement. Patient is receiving Cefepime and Flagyl for serratia bacteremia for a total of 14 days. He is now on 40% FiO2 trach collar with significant clinical improvement.      IMPRESSION:  Resolved septic shock secondary to high grade Serratia bacteremia, secondary to cholecystitis  Tension pneumothorax s/p right chest tube  Resolved transaminitis, likely acalculous cholecystitis s/p perc dawn tube 7/30  C. diff colitis on PO vancomycin  ICM s/p LVAD  BCx serratia 7/26, NGTD since 7/27  Sputum culture serratia 7/7, serratia and stenotrophomonas 7/27, stenotrophomonas 8/1  Biliary Cx negative (collected after abx started)  CT consolidation on left lower lobe, improved from CT in June 2021  No cholangitis  No signs of drive line infection    R subclavian TLC 8/1      RECOMMENDATIONS:  - completed antibiotic course for serratia bacteremia  - completed therapy for C.diff colitis    tolerating NG feeds    call if questions arise    Morris Prater MD  [phone]  After 5pm/weekends [phone]

## 2021-08-10 NOTE — PROGRESS NOTE ADULT - ATTENDING COMMENTS
Making progress after a prolonged and difficult hospitalization.  Hgb stable, RASHAWN and infection resolved.  Now on treatment for hyperthyroidism with elevated TPO Ab level. Appreciate input from Endo.  OK to start propranolol from cardiac perspective.    LDH stable, off AC given prior GIB.  MAPs at goal.  Tachycardia may be due to hyperthyroidism. Will monitor.

## 2021-08-10 NOTE — PROGRESS NOTE ADULT - SUBJECTIVE AND OBJECTIVE BOX
Chief Complaint:  Patient is a 66y old  Male who presents with a chief complaint of Anemia, Supratherapeutic INR, Dark Stools (10 Aug 2021 12:44)      Interval Events:       Hospital Medications:  acetaminophen    Suspension .. 650 milliGRAM(s) Oral every 6 hours PRN  chlorhexidine 0.12% Liquid 15 milliLiter(s) Oral Mucosa every 12 hours  clonazePAM  Tablet 0.5 milliGRAM(s) Oral at bedtime  heparin   Injectable 5000 Unit(s) SubCutaneous every 8 hours  insulin lispro (ADMELOG) corrective regimen sliding scale   SubCutaneous every 6 hours  methimazole 10 milliGRAM(s) Oral every 8 hours  metoclopramide Injectable 10 milliGRAM(s) IV Push every 8 hours  mirtazapine 7.5 milliGRAM(s) Oral daily  ondansetron Injectable 4 milliGRAM(s) IV Push every 6 hours PRN  pantoprazole  Injectable 40 milliGRAM(s) IV Push every 12 hours  polyethylene glycol 3350 17 Gram(s) Oral daily  propranolol 20 milliGRAM(s) Oral every 8 hours  senna 2 Tablet(s) Oral at bedtime      PMHX/PSHX:  No pertinent past medical history    CHF (congestive heart failure)    CAD (coronary artery disease)    Depression    Pleural effusion    History of 2019 novel coronavirus disease (COVID-19)    Hemorrhoids    Bleeding hemorrhoids    Peripheral arterial disease    Claudication    BPH with urinary obstruction    ACC/AHA stage D systolic heart failure    Anticoagulation goal of INR 2.0 to 2.5    Falls    Clavicle fracture    CKD (chronic kidney disease), stage III    Iron deficiency anemia    H/O epistaxis    Vertigo    GI bleed    No significant past surgical history    S/P TVR (tricuspid valve repair)    S/P ventricular assist device    S/P endoscopy            ROS:     General:  No weight loss, fevers, chills, night sweats, fatigue   Eyes:  No vision changes  ENT:  No sore throat, pain, runny nose  CV:  No chest pain, palpitations, dizziness   Resp:  No SOB, cough, wheezing  GI:  See HPI  :  No burning with urination, hematuria  Muscle:  No pain, weakness  Neuro:  No weakness/tingling, memory problems  Psych:  No fatigue, insomnia, mood problems, depression  Heme:  No easy bruisability  Skin:  No rash, edema      PHYSICAL EXAM:     GENERAL:  Well developed, no distress  HEENT:  NC/AT,  conjunctivae clear, sclera anicteric  CHEST:  Full & symmetric excursion, no increased effort w/ respirations  HEART:  Regular rhythm & rate  ABDOMEN:  Soft, non-tender, non-distended  EXTREMITIES:  no LE  edema  SKIN:  No rash/erythema/ecchymoses/petechiae/wounds/jaundice  NEURO:  Alert, oriented    Vital Signs:  Vital Signs Last 24 Hrs  T(C): 36.7 (10 Aug 2021 12:00), Max: 37.6 (09 Aug 2021 20:00)  T(F): 98 (10 Aug 2021 12:00), Max: 99.6 (09 Aug 2021 20:00)  HR: 126 (10 Aug 2021 13:00) (111 - 133)  BP: --  BP(mean): --  RR: 31 (10 Aug 2021 13:) (3 - 42)  SpO2: 100% (10 Aug 2021 13:) (88% - 100%)  Daily     Daily Weight in k.4 (10 Aug 2021 00:00)    LABS:                        8.8    8.36  )-----------( 297      ( 10 Aug 2021 00:51 )             28.6     08-10    131<L>  |  93<L>  |  4<L>  ----------------------------<  121<H>  3.9   |  25  |  0.54    Ca    9.7      10 Aug 2021 00:51  Phos  3.5     08-10  Mg     1.7     08-10    TPro  7.8  /  Alb  3.5  /  TBili  0.6  /  DBili  x   /  AST  33  /  ALT  30  /  AlkPhos  140<H>  08-10    LIVER FUNCTIONS - ( 10 Aug 2021 00:51 )  Alb: 3.5 g/dL / Pro: 7.8 g/dL / ALK PHOS: 140 U/L / ALT: 30 U/L / AST: 33 U/L / GGT: x                   Imaging:             Chief Complaint:  Patient is a 66y old  Male who presents with a chief complaint of Anemia, Supratherapeutic INR, Dark Stools (10 Aug 2021 12:44)      Interval Events: Reports abdominal pain but unable to say if it's chronic. It's over site of patient's LVAD and tubes mostly.      Hospital Medications:  acetaminophen    Suspension .. 650 milliGRAM(s) Oral every 6 hours PRN  chlorhexidine 0.12% Liquid 15 milliLiter(s) Oral Mucosa every 12 hours  clonazePAM  Tablet 0.5 milliGRAM(s) Oral at bedtime  heparin   Injectable 5000 Unit(s) SubCutaneous every 8 hours  insulin lispro (ADMELOG) corrective regimen sliding scale   SubCutaneous every 6 hours  methimazole 10 milliGRAM(s) Oral every 8 hours  metoclopramide Injectable 10 milliGRAM(s) IV Push every 8 hours  mirtazapine 7.5 milliGRAM(s) Oral daily  ondansetron Injectable 4 milliGRAM(s) IV Push every 6 hours PRN  pantoprazole  Injectable 40 milliGRAM(s) IV Push every 12 hours  polyethylene glycol 3350 17 Gram(s) Oral daily  propranolol 20 milliGRAM(s) Oral every 8 hours  senna 2 Tablet(s) Oral at bedtime      PMHX/PSHX:  No pertinent past medical history    CHF (congestive heart failure)    CAD (coronary artery disease)    Depression    Pleural effusion    History of 2019 novel coronavirus disease (COVID-19)    Hemorrhoids    Bleeding hemorrhoids    Peripheral arterial disease    Claudication    BPH with urinary obstruction    ACC/AHA stage D systolic heart failure    Anticoagulation goal of INR 2.0 to 2.5    Falls    Clavicle fracture    CKD (chronic kidney disease), stage III    Iron deficiency anemia    H/O epistaxis    Vertigo    GI bleed    No significant past surgical history    S/P TVR (tricuspid valve repair)    S/P ventricular assist device    S/P endoscopy            ROS:   Unable to obtain full ROS given trach      PHYSICAL EXAM:     GENERAL:  Well developed, no distress  HEENT:  NC/AT,  conjunctivae clear, sclera anicteric  CHEST:  Full & symmetric excursion, no increased effort w/ respirations  HEART:  Regular rhythm & rate  ABDOMEN:  Soft, mild tenderness along LVAD leads/dressings, non-distended  EXTREMITIES:  no LE  edema  SKIN:  No rash/erythema/ecchymoses/petechiae/wounds/jaundice  NEURO:  Alert, oriented    Vital Signs:  Vital Signs Last 24 Hrs  T(C): 36.7 (10 Aug 2021 12:00), Max: 37.6 (09 Aug 2021 20:00)  T(F): 98 (10 Aug 2021 12:00), Max: 99.6 (09 Aug 2021 20:00)  HR: 126 (10 Aug 2021 13:00) (111 - 133)  BP: --  BP(mean): --  RR: 31 (10 Aug 2021 13:00) (3 - 42)  SpO2: 100% (10 Aug 2021 13:) (88% - 100%)  Daily     Daily Weight in k.4 (10 Aug 2021 00:00)    LABS:                        8.8    8.36  )-----------( 297      ( 10 Aug 2021 00:51 )             28.6     08-10    131<L>  |  93<L>  |  4<L>  ----------------------------<  121<H>  3.9   |  25  |  0.54    Ca    9.7      10 Aug 2021 00:51  Phos  3.5     08-10  Mg     1.7     08-10    TPro  7.8  /  Alb  3.5  /  TBili  0.6  /  DBili  x   /  AST  33  /  ALT  30  /  AlkPhos  140<H>  08-10    LIVER FUNCTIONS - ( 10 Aug 2021 00:51 )  Alb: 3.5 g/dL / Pro: 7.8 g/dL / ALK PHOS: 140 U/L / ALT: 30 U/L / AST: 33 U/L / GGT: x                   Imaging:    < from: CT Abdomen and Pelvis w/ IV Cont (21 @ 17:44) >  FINDINGS:  CHEST:  LUNGS, PLEURA AND LARGE AIRWAYS: Tracheostomy tube noted. Secretions within the trachea. Emphysema. Trace bilateral pleural effusions with associated partial atelectasis of the lower lobes. Interval resolution of groundglass opacities in the left upper and lower lobes. A left lower lobe consolidation is noted. Interval placement of right-sided pigtail catheter with trace pneumothorax.  VESSELS: Within normal limits.  HEART: Heart size is enlarged. LVAD with partially thrombosed outflow tract as at prior CT. No evidence of fluid collection.  No pericardial effusion. Tricuspid valve replacement.  MEDIASTINUM AND DON: No lymphadenopathy.  CHEST WALL AND LOWER NECK: Sternotomy. Subcutaneous emphysema along right chest wall, postprocedural. Interval placement of right central venous catheter with tip in proximal right atrium.    ABDOMEN AND PELVIS:  LIVER: Within normal limits.  BILE DUCTS: Normal caliber.  GALLBLADDER: Within normal limits.  SPLEEN: Within normal limits.  PANCREAS: Within normal limits.  ADRENALS: Within normal limits.  KIDNEYS/URETERS: Within normal limits.    BLADDER: Landrum catheter.  REPRODUCTIVE ORGANS: Within normal limits.    BOWEL: Enteric tube with tip in stomach. No bowel obstruction.  PERITONEUM: Small volume abdominopelvic ascites, increased since 2021.  VESSELS: Vascular calcification of the aorta and its branches.  RETROPERITONEUM/LYMPH NODES: No lymphadenopathy.  ABDOMINAL WALL: Soft tissue swelling.  BONES: Degenerative changes.    IMPRESSION:    Left lower lobe consolidation representing a mix of atelectasis and infection.    No infectious etiology within the abdomen and pelvis.    < end of copied text >

## 2021-08-10 NOTE — PROGRESS NOTE ADULT - ASSESSMENT
67 YO M with a history of stage D NICM s/p HM2 on 9/2017 as DT (due to severe PAD) with TV ring, prior COVID-19 infection 4/2020, recurrent syncope post LVAD s/p ILR, and chronic abdominal pain with prior negative workup who was admitted with symptomatic anemia with Hgb 4.5 in setting of INR 8.8 without hemodynamic instability. He was transfused and underwent VCE which showed active bleeding in the mid small bowel but subsequent enteroscopy 6/15 did not reveal any active bleeding. He acutely decompensated after procedure with fever/hypertension, low flow alarms, and pulmonary infiltrate with hypoxia requiring intubation from probable aspiration PNA. His LDH is normal and there are no signs of overt LVAD dysfunction on echo though signs of insufficiently unloaded ventricle for which his speed has been increased. Course notable for inability to wean ventilator with persistent secretions for which he underwent tracheostomy as well as acute c diff colitis. Worsening anemia over last few days requiring transfusion with low flow alarms, concerning for recurrent GI bleed. Now w/ hypotension and fevers c/f sepsis improved. Cxs pos for serratia s/p C tube w/ IR. Overall improving however given protracted hospitalization, will engage palliative care for GOC discussion with family.   65 YO M with a history of stage D NICM s/p HM2 on 9/2017 as DT (due to severe PAD) with TV ring, prior COVID-19 infection 4/2020, recurrent syncope post LVAD s/p ILR, and chronic abdominal pain with prior negative workup who was admitted with symptomatic anemia with Hgb 4.5 in setting of INR 8.8 without hemodynamic instability. He was transfused and underwent VCE which showed active bleeding in the mid small bowel but subsequent enteroscopy 6/15 did not reveal any active bleeding. He acutely decompensated after procedure with fever/hypertension, low flow alarms, and pulmonary infiltrate with hypoxia requiring intubation from probable aspiration PNA. His LDH is normal and there are no signs of overt LVAD dysfunction on echo though signs of insufficiently unloaded ventricle for which his speed has been increased. Course notable for inability to wean ventilator with persistent secretions for which he underwent tracheostomy as well as acute c diff colitis, now resolved. Worsening anemia requiring transfusion with low flow alarms, concerning for recurrent GI bleed, now with stable Hgb. Had hypotension and septic picture. Cxs pos for serratia s/p Cholecystostomy tube by IR, cultures negative. Overall improving however given protracted hospitalization, will engage palliative care for GOC discussion with family.

## 2021-08-10 NOTE — PROGRESS NOTE ADULT - SUBJECTIVE AND OBJECTIVE BOX
INFECTIOUS DISEASES FOLLOW UP-- Anitra Prater  [phone]    This is a follow up note for this  66yMale with  Anemia    Acute cholecystitis    reports improvement in abdominal pain- tolerating NG feeds        ROS:  CONSTITUTIONAL:  No fever, good appetite  CARDIOVASCULAR:  No chest pain or palpitations  RESPIRATORY:  No dyspnea  GASTROINTESTINAL:  No nausea, vomiting, diarrhea, or abdominal pain  GENITOURINARY:  No dysuria  NEUROLOGIC:  No headache,     Allergies    No Known Allergies    Intolerances        ANTIBIOTICS/RELEVANT:  antimicrobials    immunologic:    OTHER:  acetaminophen    Suspension .. 650 milliGRAM(s) Oral every 6 hours PRN  chlorhexidine 0.12% Liquid 15 milliLiter(s) Oral Mucosa every 12 hours  clonazePAM  Tablet 0.5 milliGRAM(s) Oral at bedtime  heparin   Injectable 5000 Unit(s) SubCutaneous every 8 hours  insulin lispro (ADMELOG) corrective regimen sliding scale   SubCutaneous every 6 hours  methimazole 10 milliGRAM(s) Oral every 8 hours  metoclopramide Injectable 10 milliGRAM(s) IV Push every 8 hours  mirtazapine 7.5 milliGRAM(s) Oral daily  ondansetron Injectable 4 milliGRAM(s) IV Push every 6 hours PRN  pantoprazole  Injectable 40 milliGRAM(s) IV Push every 12 hours  polyethylene glycol 3350 17 Gram(s) Oral daily  propranolol 20 milliGRAM(s) Oral every 8 hours  senna 2 Tablet(s) Oral at bedtime      Objective:  Vital Signs Last 24 Hrs  T(C): 36.9 (10 Aug 2021 16:00), Max: 37.6 (09 Aug 2021 20:00)  T(F): 98.5 (10 Aug 2021 16:00), Max: 99.6 (09 Aug 2021 20:00)  HR: 103 (10 Aug 2021 17:00) (103 - 133)  BP: --  BP(mean): --  RR: 39 (10 Aug 2021 17:00) (3 - 42)  SpO2: 99% (10 Aug 2021 17:00) (88% - 100%)    PHYSICAL EXAM:  Constitutional:no acute distress  Eyes:ALONSO, EOMI  Ear/Nose/Throat: no oral lesions, right IJ CVC	  Respiratory: clear BL  Cardiovascular: [id number] sounds  Gastrointestinal:soft, (+) BS, no tenderness  perc dawn  VAd dressing CDI  Extremities:no e/e/c  No Lymphadenopathy  IV sites not inflammed.    LABS:                        8.8    8.36  )-----------( 297      ( 10 Aug 2021 00:51 )             28.6     08-10    131<L>  |  93<L>  |  4<L>  ----------------------------<  121<H>  3.9   |  25  |  0.54    Ca    9.7      10 Aug 2021 00:51  Phos  3.5     08-10  Mg     1.7     08-10    TPro  7.8  /  Alb  3.5  /  TBili  0.6  /  DBili  x   /  AST  33  /  ALT  30  /  AlkPhos  140<H>  08-10          MICROBIOLOGY:      Culture - Sputum . (08.01.21 @ 17:37)    -  Trimethoprim/Sulfamethoxazole: S <=0.5/9.5    Gram Stain:   Moderate polymorphonuclear leukocytes seen per low power field  No Squamous epithelial cells seen per low power field  Moderate Gram Negative Rods seen per oil power field    -  Ceftazidime: S 4    -  Levofloxacin: S <=0.5    Specimen Source: .Sputum Sputum    Culture Results:   Moderate Stenotrophomonas maltophilia  Normal Respiratory Bria absent    Organism Identification: Stenotrophomonas maltophilia    Organism: Stenotrophomonas maltophilia    Method Type: CLAU          RECENT CULTURES:      RADIOLOGY & ADDITIONAL STUDIES:    < from: Xray Chest 1 View- PORTABLE-Urgent (Xray Chest 1 View- PORTABLE-Urgent .) (08.10.21 @ 02:11) >  IMPRESSION:    Clear lungs.    < end of copied text >

## 2021-08-10 NOTE — PROGRESS NOTE ADULT - ASSESSMENT
Assessment and Recommendation:   · Assessment	  Assessment and recommendation :  Recurrent Acute hypoxic respiratory Failure S/P tracheostomy on trach-collar  at 50% FI02  Acute blood loss anemia S/P multiple  blood transfusion   S/P septic shock off vasopressin , levophed and off  Dobutamine   S/P sepsis with serratia marcescens in the blood S/P  cefepime  S/P cholecystostomy tube placement by IR    AF RVR start on bolus Amiodarone   S/P Acute left lower lobe pneumonia   Aspiration pneumonia   Stool is  +ve for C-diff. colitis on PO vancomycin  improved  Non ischemic cardiomyopathy continue ACE inhibitor and B-Blockers   S/P Septic shock and cardiogenic shock   Stage D systolic heart failure S/P LVAD HM2   MH2 LVAD  with  TV Annuloplasty  Severe peripheral vascular disease   severe hyperglycemia on insulin coverage    cardiac arrythmia  on  Amiodarone   critical care polyneuropathy   Anemia of Acute blood Loss   severe protein caloric malnutrition   S/P Small bowel bleeding   S/P blood and FFP transfusion   Chronic kidney disease stage III   NGT feeding Vital   GI prophylaxis with PPI

## 2021-08-10 NOTE — PROGRESS NOTE ADULT - SUBJECTIVE AND OBJECTIVE BOX
Chief complaint  Patient is a 66y old  Male who presents with a chief complaint of Anemia, Supratherapeutic INR, Dark Stools (10 Aug 2021 13:29)   Review of systems  Patient in bed, looks comfortable, no hypoglycemic episodes.    Labs and Fingersticks  CAPILLARY BLOOD GLUCOSE      POCT Blood Glucose.: 120 mg/dL (10 Aug 2021 12:08)  POCT Blood Glucose.: 142 mg/dL (10 Aug 2021 05:21)  POCT Blood Glucose.: 116 mg/dL (09 Aug 2021 23:43)  POCT Blood Glucose.: 90 mg/dL (09 Aug 2021 17:18)      Medications  MEDICATIONS  (STANDING):  chlorhexidine 0.12% Liquid 15 milliLiter(s) Oral Mucosa every 12 hours  clonazePAM  Tablet 0.5 milliGRAM(s) Oral at bedtime  heparin   Injectable 5000 Unit(s) SubCutaneous every 8 hours  insulin lispro (ADMELOG) corrective regimen sliding scale   SubCutaneous every 6 hours  methimazole 10 milliGRAM(s) Oral every 8 hours  metoclopramide Injectable 10 milliGRAM(s) IV Push every 8 hours  mirtazapine 7.5 milliGRAM(s) Oral daily  pantoprazole  Injectable 40 milliGRAM(s) IV Push every 12 hours  polyethylene glycol 3350 17 Gram(s) Oral daily  propranolol 20 milliGRAM(s) Oral every 8 hours  senna 2 Tablet(s) Oral at bedtime      Physical Exam  General: Patient comfortable in bed  Vital Signs Last 12 Hrs  T(F): 98 (08-10-21 @ 12:00), Max: 98.8 (08-10-21 @ 08:00)  HR: 126 (08-10-21 @ 13:00) (113 - 133)  BP: --  BP(mean): --  RR: 31 (08-10-21 @ 13:00) (8 - 42)  SpO2: 100% (08-10-21 @ 13:00) (88% - 100%)    Diagnostics    US Thyroid: Routine   Indication: hyperthyroidism  Transport: Stretcher-eyal Ron/ Monitor  Provider's Contact #: [phone] (08-08 @ 16:13)  TSH Receptor Antibody: AM Sched. Collection: 09-Aug-2021 06:00 (08-08 @ 13:31)  Thyroperoxidase Antibody: AM Sched. Collection: 09-Aug-2021 06:00 (08-08 @ 13:31)           Chief complaint  Patient is a 66y old  Male who presents with a chief complaint of Anemia, Supratherapeutic INR, Dark Stools (10 Aug 2021 13:29)   Review of systems  Patient in bed, looks comfortable, no hypoglycemic episodes.    Labs and Fingersticks  CAPILLARY BLOOD GLUCOSE      POCT Blood Glucose.: 120 mg/dL (10 Aug 2021 12:08)  POCT Blood Glucose.: 142 mg/dL (10 Aug 2021 05:21)  POCT Blood Glucose.: 116 mg/dL (09 Aug 2021 23:43)  POCT Blood Glucose.: 90 mg/dL (09 Aug 2021 17:18)      Medications  MEDICATIONS  (STANDING):  chlorhexidine 0.12% Liquid 15 milliLiter(s) Oral Mucosa every 12 hours  clonazePAM  Tablet 0.5 milliGRAM(s) Oral at bedtime  heparin   Injectable 5000 Unit(s) SubCutaneous every 8 hours  insulin lispro (ADMELOG) corrective regimen sliding scale   SubCutaneous every 6 hours  methimazole 10 milliGRAM(s) Oral every 8 hours  metoclopramide Injectable 10 milliGRAM(s) IV Push every 8 hours  mirtazapine 7.5 milliGRAM(s) Oral daily  pantoprazole  Injectable 40 milliGRAM(s) IV Push every 12 hours  polyethylene glycol 3350 17 Gram(s) Oral daily  propranolol 20 milliGRAM(s) Oral every 8 hours  senna 2 Tablet(s) Oral at bedtime      Physical Exam  General: Patient comfortable in bed  Vital Signs Last 12 Hrs  T(F): 98 (08-10-21 @ 12:00), Max: 98.8 (08-10-21 @ 08:00)  HR: 126 (08-10-21 @ 13:00) (113 - 133)  BP: --  BP(mean): --  RR: 31 (08-10-21 @ 13:00) (8 - 42)  SpO2: 100% (08-10-21 @ 13:00) (88% - 100%)    Diagnostics    US Thyroid: Routine   Indication: hyperthyroidism  Transport: Stretcher-eyal Ron/ Monitor  Provider's Contact #: [phone] (08-08 @ 16:13)  TSH Receptor Antibody: AM Sched. Collection: 09-Aug-2021 06:00 (08-08 @ 13:31)  Thyroperoxidase Antibody: AM Sched. Collection: 09-Aug-2021 06:00 (08-08 @ 13:31)

## 2021-08-10 NOTE — PROGRESS NOTE ADULT - ASSESSMENT
66M Hx stage D NICM s/p HM2 on 9/2017 as DT (due to severe PAD) with TV ring, prior COVID-19 infection 4/2020, recurrent syncope post LVAD s/p ILR, and chronic abdominal pain with prior negative workup who was admitted with symptomatic anemia and supratherapeutic INR.     #Abd pain: pt with vacillating history regarding abdominal pain (reports location at times at per dawn/LVAD site, other times epigastric region, notes left sided pain many years prior // endorses chronicity possibly many years prior vs noted to be possibly x 1 week according to team // unclear if associated with TF). Now having brown BMs, no further recent bleeding. Ddx unclear source of pain -- possibly MSK at site of perc dawn drain/LVAD pump vs can consider esophagitis in setting of long standing NGT vs IBS vs unlikely biliary in nature (perc dawn draining well, normal LFTs) vs unlikely chronic mesenteric ischemia (vessels patent on imaging) vs unlikely SMA syndrome.   # Acute blood loss anemia and melena - hemodynamically unchanged. Likely had recurrent GI bleed this admission. Initial melena workup lead to capsule endoscopy on 6/14/2021 which revealed active bleeding in small bowel.  Single balloon 6/15/2021 revealed old blood in proximal ileum. 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  # LVAD on anticoagulation with coumadin  # Trach  # Tension pneumo s/p chest tube placement 7/26  # Septic shock - 2/2 serratia bacteremia 2/2 cholecystitis  # Acalculous cholecystitis - s/p perc dawn  #S/p Cdiff infection    Recommendations:  - c/w PPI BID in case esophagitis 2/2 NGT contributing to abdominal pain   - c/w TF as tolerated  - Consider IV tylenol for MSK pain control and monitor response   - Monitor further symptoms and associations        Susan Jacobo PGY-5  Gastroenterology Fellow  Pager #[phone] ([facility]) or [phone] ([facility])  Available on [employer] Teams.  Please contact on-call GI fellow via answering service ([phone]) after 5pm and before 8am, and on weekends.

## 2021-08-10 NOTE — PROGRESS NOTE ADULT - ATTENDING COMMENTS
Patient seen and examined, agree with above. He has ?chronic abdominal pain, possibly at sites of percutaneous drains and LVAD insertion, has had negative imaging and pan-endoscopy. No obvious GI pathology to account for pain, would continue PPI daily, no additional GI intervention planned at this time.

## 2021-08-10 NOTE — PROGRESS NOTE ADULT - PROBLEM SELECTOR PLAN 9
-methimazole 10mg q8  -will start BB, preferable coreg but amentable to propranolol  -endo following -methimazole 10mg q8  -will start BB, preferable coreg but amenable to propranolol  -endo following

## 2021-08-10 NOTE — PROGRESS NOTE ADULT - ASSESSMENT
Assessment  Hyperthyroidism: 66y Male with no history thyroid disease, was not on any thyroid supplements, previously euthyroid (June 2021), was on amiodarone (last dose 8/7), now in hyperthyroid state. Patient started on Methimazole 10mg TID, LFTs WNL, starting propanolol for tachycardia, TPO ab slightly elevated, TSH-R ab pending, thyroid US pending.  Hyperglycemia: Patient in nondiabetic range, A1C 5.6%, now on insulin coverage, blood sugars in acceptable range, no hypoglycemias, on TFs.  CHF: on medications, stable, monitored.  Anemia: Monitored      Pavan Barone MD  Cell: [phone]  Office: [phone]       Assessment  Hyperthyroidism: 66y Male with no history thyroid disease, was not on any thyroid supplements, previously euthyroid (June 2021), was on amiodarone (last dose 8/7), now in hyperthyroid state. Patient started on Methimazole 10mg TID, LFTs WNL,  starting propanolol for tachycardia, TPO ab slightly elevated, TSH-R ab pending, thyroid US pending.  Hyperglycemia: Patient in nondiabetic range, A1C 5.6%, now on insulin coverage, blood sugars in acceptable range, no hypoglycemias, on TFs.  CHF: on medications, stable, monitored.  Anemia: Monitored      Pavan Barone MD  Cell: [phone]  Office: [phone]

## 2021-08-10 NOTE — PROGRESS NOTE ADULT - SUBJECTIVE AND OBJECTIVE BOX
RICKY HAMPTON  MRN-[id number]  Patient is a 66y old  Male who presents with a chief complaint of Anemia, Supratherapeutic INR, Dark Stools (09 Aug 2021 19:20)    HPI:  65M PMH ACC/AHA stage D HF due to NICM HM2 LVAD , TV annuloplasty ring 17 as destination therapy due to severe peripheral artery disease with significant stenosis  SIADH, Depression, CKD-3 with hyperkalemia, past E. coli UTIs, driveline drainage (21) and COVID-19 (back in 2020)  He was recently seen in clinic where he complained of abdominal pain and dark stools w constipation back in May. He presents to [facility] ER today weakness and fatigue, moderate and + Black stools for three days, on coumadin secondary to warfarin use in the setting of an LVAD. Patient has required transfusions for GIB in the past. Mostly recently back in 2021 pt had anemia with dark stools. No interventions was done at that time. However Last Endoscopy was done in 2020 (negative). Today labs show patient is anemic with H/H of 4.5/16.3,. INR is 8.84 MAP in the 90s, Temp 35.1. He denies any chest pain, shortness of breath, dizziness, abd pain, nausea or vomiting.       (2021 16:57)      Surgery/Hospital Course:   admit for melena w/ anemia, INR 8.84   6/15 Capsul study (+) for small bowel bleed, balloon endoscopy (old blood in prox ileum); post EGD - septic w/ L opacity, re-intubated for concern for aspiration, TTE (Mod MR, decrease biV w/ interventricular septum boweing towards R)   bronch    +C Diff    TC    CT C/A/P: Fluid filled colon which may be 2/2 rapid transit. Small bilateral pleffs with associates. Compressive atelectasis New ISABELLE & LLL  parenchymal opacities, suspicious for pneumonia. 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    #8 Shiley trach at bedside    CPAP trials    LVAD speed increased to 9200   Bronch; Central line dc'ed   TC since , continue as tolerated. Patient transferred to SDU.    Cont PT, no trach downsize today(#8cuffed)/ Anticoagulation/ Continue TF, speech f/u/ Vanco taper    oob to chair  INR  2.47  5 mg coumadin     increased lop to 25 q8  per HF   INR today 2.64.  H&H 7.3/24 this AM.  Will repeat CBC at noon, and will send stool guaiac Patient with persistent abdominal tenderness, rate of tube feeds decreased.  No nausea/vomiting.     INR today 2.4H&H 9.1/.6 low flow overnight /N&V  NPO resolved for capsule study  Coumadin on hold refusing Tube feeds on D5  normal  @50 cc/hr   INR 2.69  H&H 7..1 refusing Tube feeds on D5 2 normal  @50 cc/hr. This am + BM Anusha Oneill HF  aware- PRBC x1  GI team consulted -  NPO  plan on study in am-  D/w Dr Cadet Patient  to return to CTU for further management; 1PRBCS    Post op INR 2.2 today.  No bleeding. BC + for SM.  Pt is hypotensive requiring pressor and inotropic support.  ID follow up today on Cefepime will follow.   R PTC for PTX    CT C/A/P: sub q emphysema in R chest wall, GGO RUL, small ascites CTH negative; Abd US: GB thickening, pericholecystic fluid     Perchole drain in place continues to drain total output overnight 133.  Fever today 38.8 given tylenol.  Will repeat BC now.     duplex LE negative    Patient with persistent abdominal pain, refusing tube feeds and medications, Psych consulted    Today:    REVIEW OF SYSTEMS:  Unable to obtain secondary to pt being trached    ICU Vital Signs Last 24 Hrs  T(C): 37.1 (10 Aug 2021 08:00), Max: 37.6 (09 Aug 2021 20:00)  T(F): 98.8 (10 Aug 2021 08:00), Max: 99.6 (09 Aug 2021 20:00)  HR: 126 (10 Aug 2021 07:00) (109 - 126)  BP: --  BP(mean): --  ABP: 107/77 (10 Aug 2021 07:00) (92/63 - 117/81)  ABP(mean): 89 (10 Aug 2021 07:00) (74 - 96)  RR: 27 (10 Aug 2021 07:00) (3 - 42)  SpO2: 97% (10 Aug 2021 07:00) (88% - 100%)      Physical Exam:  Gen:  awake and alert, NAD  CNS:  intact, nonfocal, responds to all commands  Neck: no JVD, +TC   RES : course breath sounds, no wheezing              CVS: +LVAD hum   Abd: Soft, minimally-moderately tender in RUQ by perc dawn site, NT everywhere else, positive BS throughout. Perc dawn drain site c/d/i draining bilious fluid.  Skin: No rash  Ext:  no edema    ============================I/O===========================   I&O's Detail    09 Aug 2021 07:01  -  10 Aug 2021 07:00  --------------------------------------------------------  IN:    dextrose 5%: 660 mL    Enteral Tube Flush: 140 mL    IV PiggyBack: 300 mL    Miscellaneous Tube Feedin mL  Total IN: 1370 mL    OUT:    Drain (mL): 400 mL    Voided (mL): 1625 mL  Total OUT: 2025 mL    Total NET: -655 mL      10 Aug 2021 07:01  -  10 Aug 2021 07:52  --------------------------------------------------------  IN:    dextrose 5%: 30 mL    Miscellaneous Tube Feedin mL  Total IN: 60 mL    OUT:    Voided (mL): 200 mL  Total OUT: 200 mL    Total NET: -140 mL        ============================ LABS =========================                        8.8    8.36  )-----------( 297      ( 10 Aug 2021 00:51 )             28.6     08-10    131<L>  |  93<L>  |  4<L>  ----------------------------<  121<H>  3.9   |  25  |  0.54    Ca    9.7      10 Aug 2021 00:51  Phos  3.5     08-10  Mg     1.7     08-10    TPro  7.8  /  Alb  3.5  /  TBili  0.6  /  DBili  x   /  AST  33  /  ALT  30  /  AlkPhos  140<H>  08-10    LIVER FUNCTIONS - ( 10 Aug 2021 00:51 )  Alb: 3.5 g/dL / Pro: 7.8 g/dL / ALK PHOS: 140 U/L / ALT: 30 U/L / AST: 33 U/L / GGT: x             ABG - ( 10 Aug 2021 00:35 )  pH, Arterial: 7.39  pH, Blood: x     /  pCO2: 50    /  pO2: 127   / HCO3: 29    / Base Excess: 4.0   /  SaO2: 99                  ======================Micro/Rad/Cardio=================  Culture: Reviewed   CXR: Reviewed  Echo:Reviewed  ======================================================  PAST MEDICAL & SURGICAL HISTORY:  CHF (congestive heart failure)    CAD (coronary artery disease)    Depression    Pleural effusion    History of 2019 novel coronavirus disease (COVID-19)  2020    Hemorrhoids    Bleeding hemorrhoids    Peripheral arterial disease    Claudication    BPH with urinary obstruction    ACC/AHA stage D systolic heart failure    Anticoagulation goal of INR 2.0 to 2.5    Falls    Clavicle fracture    CKD (chronic kidney disease), stage III    Iron deficiency anemia    H/O epistaxis    Vertigo    GI bleed    S/P TVR (tricuspid valve repair)    S/P ventricular assist device    S/P endoscopy      ====================ASSESSMENT ==============  Stage D Nonischemic Cardiomyopathy, Status Post HM2 on 2017    Cardiogenic shock  Hemodynamic instability   Acute hypoxemic respiratory failure  GI bleed , Status Post Enteroscopy   Anemia, in setting of melena   Chronic Kidney Disease  Stress hyperglycemia   C.diff positive on    Hypovolemic shock  Septic shock    Plan:  ====================== NEUROLOGY=====================  CTH on  with no acute intracranial hemorrhage or acute territorial infarction  Tylenol PRN for analgesia   Sleep regimen with Clonazepam   C/w Mirtazapine      acetaminophen    Suspension .. 650 milliGRAM(s) Oral every 6 hours PRN Mild Pain (1 - 3)  clonazePAM  Tablet 0.5 milliGRAM(s) Oral at bedtime  mirtazapine 7.5 milliGRAM(s) Oral daily    ==================== RESPIRATORY======================  Acute hypoxemic respiratory failure s/p #8 shiley trach on ; TC vent weaning as tolerated  Requiring intermittent full vent support, continue close monitoring of respiratory rate and breathing pattern, following of ABG’s with A-line monitoring, continuous pulse oximetry monitoring.   CT chest : Sub q emphysema in R chest wall, GGO RUL     Mechanical Ventilation:  Mode: OFF  FiO2: 40    ====================CARDIOVASCULAR==================  Stage D Nonischemic Cardiomyopathy, Status Post HM2 on 2017; LVAD settings 9200 with flow of 5.7  TTE : reveals at least moderate MR, mild AI, severe global LV syst dysfxn, dec RV fxn   Continue invasive hemodynamic monitoring     ===================HEMATOLOGIC/ONC ===================  Anemia due to acute blood loss, monitor H&H/Plts    Continue monitoring daily LDH - No anticoagulation as he has had multiple GI bleeds    VTE prophylaxis, heparin   Injectable 5000 Unit(s) SubCutaneous every 8 hours    ===================== RENAL =========================  Continue monitoring urine output, I&OS, BUN/Cr   Replete lytes PRN. Keep K> 4 and Mg >2     ==================== GASTROINTESTINAL===================  CT A/P on : small ascites; ABD US showing GB wall thickening w/ surrounding pericholecystic fluid  S/p cholecystostomy tube placed on : with IR with -60cc of black bile, will monitor site closely.   Tolerating trickle feeds, Vital AF @ 60cc/hr   Recent emesis on  in setting of abdominal pain, as per GI likely component of illeus given critical illness   If worsening abdominal pain or vomiting, recommended CT A/P to r/o toxic megacolon - continue Reglan TID  Bowel regimen with Miralax and Senna   Zofran PRN for nausea     dextrose 5%. 1000 milliLiter(s) (25 mL/Hr) IV Continuous <Continuous>  GI prophylaxis, pantoprazole  Injectable 40 milliGRAM(s) IV Push every 12 hours  polyethylene glycol 3350 17 Gram(s) Oral daily  senna 2 Tablet(s) Oral at bedtime  ondansetron Injectable 4 milliGRAM(s) IV Push every 6 hours PRN Nausea and/or Vomiting  metoclopramide Injectable 10 milliGRAM(s) IV Push every 8 hours    =======================    ENDOCRINE  =====================  Stress hyperglycemia, continue glucose control with admelog sliding scale   Methimazole for hyperthyroidism     insulin lispro (ADMELOG) corrective regimen sliding scale   SubCutaneous every 6 hours  methimazole 10 milliGRAM(s) Oral every 8 hours    ========================INFECTIOUS DISEASE================  Afebrile, WBC within normal limits  Continue trending WBC and monitoring fever curve       Patient requires continuous monitoring with bedside rhythm monitoring, pulse ox monitoring, and intermittent blood gas analysis. Care plan discussed with ICU care team. Patient remained critical and at risk for life threatening decompensation.     By signing my name below, I, Agnieszka Cherry, attest that this documentation has been prepared under the direction and in the presence of CLAUDIA Goldstein   Electronically signed: Cesia Shaffer, 08-10-21 @ 07:52    I, Cristóbal Cisneros, personally performed the services described in this documentation. all medical record entries made by the luisibmel were at my direction and in my presence. I have reviewed the chart and agree that the record reflects my personal performance and is accurate and complete  Electronically signed: CLAUDIA Goldstein        RICKY HAMPTON  MRN-[id number]  Patient is a 66y old  Male who presents with a chief complaint of Anemia, Supratherapeutic INR, Dark Stools (09 Aug 2021 19:20)    HPI:  65M PMH ACC/AHA stage D HF due to NICM HM2 LVAD , TV annuloplasty ring 17 as destination therapy due to severe peripheral artery disease with significant stenosis  SIADH, Depression, CKD-3 with hyperkalemia, past E. coli UTIs, driveline drainage (21) and COVID-19 (back in 2020)  He was recently seen in clinic where he complained of abdominal pain and dark stools w constipation back in May. He presents to [facility] ER today weakness and fatigue, moderate and + Black stools for three days, on coumadin secondary to warfarin use in the setting of an LVAD. Patient has required transfusions for GIB in the past. Mostly recently back in 2021 pt had anemia with dark stools. No interventions was done at that time. However Last Endoscopy was done in 2020 (negative). Today labs show patient is anemic with H/H of 4.5/16.3,. INR is 8.84 MAP in the 90s, Temp 35.1. He denies any chest pain, shortness of breath, dizziness, abd pain, nausea or vomiting.       (2021 16:57)      Surgery/Hospital Course:   admit for melena w/ anemia, INR 8.84   6/15 Capsul study (+) for small bowel bleed, balloon endoscopy (old blood in prox ileum); post EGD - septic w/ L opacity, re-intubated for concern for aspiration, TTE (Mod MR, decrease biV w/ interventricular septum boweing towards R)   bronch    +C Diff    TC    CT C/A/P: Fluid filled colon which may be 2/2 rapid transit. Small bilateral pleffs with associates. Compressive atelectasis New ISABELLE & LLL  parenchymal opacities, suspicious for pneumonia. 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    #8 Shiley trach at bedside    CPAP trials    LVAD speed increased to 9200   Bronch; Central line dc'ed   TC since , continue as tolerated. Patient transferred to SDU.    Cont PT, no trach downsize today(#8cuffed)/ Anticoagulation/ Continue TF, speech f/u/ Vanco taper    oob to chair  INR  2.47  5 mg coumadin     increased lop to 25 q8  per HF   INR today 2.64.  H&H 7.3/24 this AM.  Will repeat CBC at noon, and will send stool guaiac Patient with persistent abdominal tenderness, rate of tube feeds decreased.  No nausea/vomiting.     INR today 2.4H&H 9.1/.6 low flow overnight /N&V  NPO resolved for capsule study  Coumadin on hold refusing Tube feeds on D5  normal  @50 cc/hr   INR 2.69  H&H 7..1 refusing Tube feeds on D5  normal  @50 cc/hr. This am + BM Anusha Oneill HF  aware- PRBC x1  GI team consulted -  NPO  plan on study in am-  D/w Dr Cadet Patient  to return to CTU for further management; 1PRBCS    Post op INR 2.2 today.  No bleeding. BC + for SM.  Pt is hypotensive requiring pressor and inotropic support.  ID follow up today on Cefepime will follow.   R PTC for PTX    CT C/A/P: sub q emphysema in R chest wall, GGO RUL, small ascites CTH negative; Abd US: GB thickening, pericholecystic fluid     Perchole drain in place continues to drain total output overnight 133.  Fever today 38.8 given tylenol.  Will repeat BC now.     duplex LE negative    Patient with persistent abdominal pain, refusing tube feeds and medications, Psych consulted    Today:  Tolerating TC since , continue as tolerated. Thyroid US to evaluate for thyroid nodule/goiter in setting of hyperthyroid  - contraindicated at this time 2/2 trach, will continue methimazole and monitor TSH closely. PT as tolerated.     REVIEW OF SYSTEMS:  Unable to obtain secondary to pt being trached    ICU Vital Signs Last 24 Hrs  T(C): 37.1 (10 Aug 2021 08:00), Max: 37.6 (09 Aug 2021 20:00)  T(F): 98.8 (10 Aug 2021 08:00), Max: 99.6 (09 Aug 2021 20:00)  HR: 126 (10 Aug 2021 07:) (109 - 126)  BP: --  BP(mean): --  ABP: 107/77 (10 Aug 2021 07:00) (92/63 - 117/81)  ABP(mean): 89 (10 Aug 2021 07:) (74 - 96)  RR: 27 (10 Aug 2021 07:) (3 - 42)  SpO2: 97% (10 Aug 2021 07:) (88% - 100%)      Physical Exam:  Gen:  awake and alert, NAD  CNS:  intact, nonfocal, responds to all commands  Neck: no JVD, +TC   RES : course breath sounds, no wheezing              CVS: +LVAD hum   Abd: Soft, minimally-moderately tender in RUQ by perc dawn site, NT everywhere else, positive BS throughout. Perc dawn drain site c/d/i draining bilious fluid.  Skin: No rash  Ext:  no edema    ============================I/O===========================   I&O's Detail    09 Aug 2021 07:01  -  10 Aug 2021 07:00  --------------------------------------------------------  IN:    dextrose 5%: 660 mL    Enteral Tube Flush: 140 mL    IV PiggyBack: 300 mL    Miscellaneous Tube Feedin mL  Total IN: 1370 mL    OUT:    Drain (mL): 400 mL    Voided (mL): 1625 mL  Total OUT: 2025 mL    Total NET: -655 mL      10 Aug 2021 07:01  -  10 Aug 2021 07:52  --------------------------------------------------------  IN:    dextrose 5%: 30 mL    Miscellaneous Tube Feedin mL  Total IN: 60 mL    OUT:    Voided (mL): 200 mL  Total OUT: 200 mL    Total NET: -140 mL        ============================ LABS =========================                        8.8    8.36  )-----------( 297      ( 10 Aug 2021 00:51 )             28.6     08-10    131<L>  |  93<L>  |  4<L>  ----------------------------<  121<H>  3.9   |  25  |  0.54    Ca    9.7      10 Aug 2021 00:51  Phos  3.5     08-10  Mg     1.7     08-10    TPro  7.8  /  Alb  3.5  /  TBili  0.6  /  DBili  x   /  AST  33  /  ALT  30  /  AlkPhos  140<H>  08-10    LIVER FUNCTIONS - ( 10 Aug 2021 00:51 )  Alb: 3.5 g/dL / Pro: 7.8 g/dL / ALK PHOS: 140 U/L / ALT: 30 U/L / AST: 33 U/L / GGT: x             ABG - ( 10 Aug 2021 00:35 )  pH, Arterial: 7.39  pH, Blood: x     /  pCO2: 50    /  pO2: 127   / HCO3: 29    / Base Excess: 4.0   /  SaO2: 99                  ======================Micro/Rad/Cardio=================  Culture: Reviewed   CXR: Reviewed  Echo:Reviewed  ======================================================  PAST MEDICAL & SURGICAL HISTORY:  CHF (congestive heart failure)    CAD (coronary artery disease)    Depression    Pleural effusion    History of 2019 novel coronavirus disease (COVID-19)  2020    Hemorrhoids    Bleeding hemorrhoids    Peripheral arterial disease    Claudication    BPH with urinary obstruction    ACC/AHA stage D systolic heart failure    Anticoagulation goal of INR 2.0 to 2.5    Falls    Clavicle fracture    CKD (chronic kidney disease), stage III    Iron deficiency anemia    H/O epistaxis    Vertigo    GI bleed    S/P TVR (tricuspid valve repair)    S/P ventricular assist device    S/P endoscopy      ====================ASSESSMENT ==============  Stage D Nonischemic Cardiomyopathy, Status Post HM2 on 2017    Cardiogenic shock  Hemodynamic instability   Acute hypoxemic respiratory failure  GI bleed , Status Post Enteroscopy   Anemia, in setting of melena   Chronic Kidney Disease  Stress hyperglycemia   C.diff positive on    Hypovolemic shock  Septic shock    Plan:  ====================== NEUROLOGY=====================  CTH on  with no acute intracranial hemorrhage or acute territorial infarction  Tylenol PRN for analgesia   Sleep regimen with Clonazepam   C/w Mirtazapine   PT as tolerated      acetaminophen    Suspension .. 650 milliGRAM(s) Oral every 6 hours PRN Mild Pain (1 - 3)  clonazePAM  Tablet 0.5 milliGRAM(s) Oral at bedtime  mirtazapine 7.5 milliGRAM(s) Oral daily    ==================== RESPIRATORY======================  Acute hypoxemic respiratory failure s/p #8 shiley trach on ; tolerating TC since , continue as tolerated  Requiring intermittent full vent support, continue close monitoring of respiratory rate and breathing pattern, following of ABG’s with A-line monitoring, continuous pulse oximetry monitoring.   CT chest : Sub q emphysema in R chest wall, GGO RUL     Mechanical Ventilation:  Mode: OFF  FiO2: 40    ====================CARDIOVASCULAR==================  Stage D Nonischemic Cardiomyopathy, Status Post HM2 on 2017; LVAD settings 9200 with flow of 5.7  TTE : reveals at least moderate MR, mild AI, severe global LV syst dysfxn, dec RV fxn   Continue invasive hemodynamic monitoring     ===================HEMATOLOGIC/ONC ===================  Anemia due to acute blood loss, monitor H&H/Plts    Continue monitoring daily LDH - No anticoagulation as he has had multiple GI bleeds    VTE prophylaxis, heparin   Injectable 5000 Unit(s) SubCutaneous every 8 hours    ===================== RENAL =========================  Continue monitoring urine output, I&OS, BUN/Cr   Replete lytes PRN. Keep K> 4 and Mg >2     ==================== GASTROINTESTINAL===================  CT A/P on : small ascites; ABD US showing GB wall thickening w/ surrounding pericholecystic fluid  S/p cholecystostomy tube placed on : with IR with -60cc of black bile, will monitor site closely.   Tolerating trickle feeds, Vital AF @ 60cc/hr   Recent emesis on  in setting of abdominal pain, as per GI likely component of illeus given critical illness   If worsening abdominal pain or vomiting, recommended CT A/P to r/o toxic megacolon - continue Reglan TID  Bowel regimen with Miralax and Senna   Zofran PRN for nausea     dextrose 5%. 1000 milliLiter(s) (25 mL/Hr) IV Continuous <Continuous>  GI prophylaxis, pantoprazole  Injectable 40 milliGRAM(s) IV Push every 12 hours  polyethylene glycol 3350 17 Gram(s) Oral daily  senna 2 Tablet(s) Oral at bedtime  ondansetron Injectable 4 milliGRAM(s) IV Push every 6 hours PRN Nausea and/or Vomiting  metoclopramide Injectable 10 milliGRAM(s) IV Push every 8 hours    =======================    ENDOCRINE  =====================  Stress hyperglycemia, continue glucose control with admelog sliding scale   Thyroid US to evaluate for thyroid nodule/goiter in setting of hyperthyroid  - contraindicated at this time 2/2 trach  Will continue methimazole and monitor TSH closely      insulin lispro (ADMELOG) corrective regimen sliding scale   SubCutaneous every 6 hours  methimazole 10 milliGRAM(s) Oral every 8 hours    ========================INFECTIOUS DISEASE================  Afebrile, WBC within normal limits  Continue trending WBC and monitoring fever curve       Patient requires continuous monitoring with bedside rhythm monitoring, pulse ox monitoring, and intermittent blood gas analysis. Care plan discussed with ICU care team. Patient remained critical and at risk for life threatening decompensation.     By signing my name below, I, Agnieszka Cherry, attest that this documentation has been prepared under the direction and in the presence of CLAUDIA Goldstein   Electronically signed: Cesia Shaffer, 08-10-21 @ 07:52    I, Cristóbal Cisneros, personally performed the services described in this documentation. all medical record entries made by the luisibmel were at my direction and in my presence. I have reviewed the chart and agree that the record reflects my personal performance and is accurate and complete  Electronically signed: CLAUDIA Goldstein        RICKY HAMPTON  MRN-[id number]  Patient is a 66y old  Male who presents with a chief complaint of Anemia, Supratherapeutic INR, Dark Stools (09 Aug 2021 19:20)    HPI:  65M PMH ACC/AHA stage D HF due to NICM HM2 LVAD , TV annuloplasty ring 17 as destination therapy due to severe peripheral artery disease with significant stenosis  SIADH, Depression, CKD-3 with hyperkalemia, past E. coli UTIs, driveline drainage (21) and COVID-19 (back in 2020)  He was recently seen in clinic where he complained of abdominal pain and dark stools w constipation back in May. He presents to [facility] ER today weakness and fatigue, moderate and + Black stools for three days, on coumadin secondary to warfarin use in the setting of an LVAD. Patient has required transfusions for GIB in the past. Mostly recently back in 2021 pt had anemia with dark stools. No interventions was done at that time. However Last Endoscopy was done in 2020 (negative). Today labs show patient is anemic with H/H of 4.5/16.3,. INR is 8.84 MAP in the 90s, Temp 35.1. He denies any chest pain, shortness of breath, dizziness, abd pain, nausea or vomiting.       (2021 16:57)      Surgery/Hospital Course:   admit for melena w/ anemia, INR 8.84   6/15 Capsul study (+) for small bowel bleed, balloon endoscopy (old blood in prox ileum); post EGD - septic w/ L opacity, re-intubated for concern for aspiration, TTE (Mod MR, decrease biV w/ interventricular septum boweing towards R)   bronch    +C Diff    TC    CT C/A/P: Fluid filled colon which may be 2/2 rapid transit. Small bilateral pleffs with associates. Compressive atelectasis New ISABELLE & LLL  parenchymal opacities, suspicious for pneumonia. 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    #8 Shiley trach at bedside    CPAP trials    LVAD speed increased to 9200   Bronch; Central line dc'ed   TC since , continue as tolerated. Patient transferred to SDU.    Cont PT, no trach downsize today(#8cuffed)/ Anticoagulation/ Continue TF, speech f/u/ Vanco taper    oob to chair  INR  2.47  5 mg coumadin     increased lop to 25 q8  per HF   INR today 2.64.  H&H 7.3/24 this AM.  Will repeat CBC at noon, and will send stool guaiac Patient with persistent abdominal tenderness, rate of tube feeds decreased.  No nausea/vomiting.     INR today 2.4H&H 9.1/.6 low flow overnight /N&V  NPO resolved for capsule study  Coumadin on hold refusing Tube feeds on D5  normal  @50 cc/hr   INR 2.69  H&H 7..1 refusing Tube feeds on D5  normal  @50 cc/hr. This am + BM Anusha Oneill HF  aware- PRBC x1  GI team consulted -  NPO  plan on study in am-  D/w Dr Cadet Patient  to return to CTU for further management; 1PRBCS    Post op INR 2.2 today.  No bleeding. BC + for SM.  Pt is hypotensive requiring pressor and inotropic support.  ID follow up today on Cefepime will follow.   R PTC for PTX    CT C/A/P: sub q emphysema in R chest wall, GGO RUL, small ascites CTH negative; Abd US: GB thickening, pericholecystic fluid     Perchole drain in place continues to drain total output overnight 133.  Fever today 38.8 given tylenol.  Will repeat BC now.     duplex LE negative    Patient with persistent abdominal pain, refusing tube feeds and medications, Psych consulted    Today:  Tolerating TC since , continue as tolerated. Thyroid US to evaluate for thyroid nodule/goiter in setting of hyperthyroid  - contraindicated at this time 2/2 trach, will continue methimazole and monitor TSH closely. PT as tolerated. Ambulated today no issues. Started on Propranolol for HR rate control from Hyperthyroidism. Given Ketamine 30 IV x2 for mood. Tolerating tube feeds now, increasing to goal and d/c'd maintenance fluids.    REVIEW OF SYSTEMS:  Unable to obtain secondary to pt being trached    ICU Vital Signs Last 24 Hrs  T(C): 37.1 (10 Aug 2021 08:00), Max: 37.6 (09 Aug 2021 20:00)  T(F): 98.8 (10 Aug 2021 08:00), Max: 99.6 (09 Aug 2021 20:00)  HR: 126 (10 Aug 2021 07:00) (109 - 126)  BP: --  BP(mean): --  ABP: 107/77 (10 Aug 2021 07:00) (92/63 - 117/81)  ABP(mean): 89 (10 Aug 2021 07:) (74 - 96)  RR: 27 (10 Aug 2021 07:) (3 - 42)  SpO2: 97% (10 Aug 2021 07:) (88% - 100%)      Physical Exam:  Gen:  awake and alert, NAD  CNS:  intact, nonfocal, responds to all commands  Neck: no JVD, +TC   RES : course breath sounds, no wheezing              CVS: +LVAD hum   Abd: Soft, minimally-moderately tender in RUQ by perc dawn site, NT everywhere else, positive BS throughout. Perc dawn drain site c/d/i draining bilious fluid.  Skin: No rash  Ext:  no edema    ============================I/O===========================   I&O's Detail    09 Aug 2021 07:01  -  10 Aug 2021 07:00  --------------------------------------------------------  IN:    dextrose 5%: 660 mL    Enteral Tube Flush: 140 mL    IV PiggyBack: 300 mL    Miscellaneous Tube Feedin mL  Total IN: 1370 mL    OUT:    Drain (mL): 400 mL    Voided (mL): 1625 mL  Total OUT: 2025 mL    Total NET: -655 mL      10 Aug 2021 07:01  -  10 Aug 2021 07:52  --------------------------------------------------------  IN:    dextrose 5%: 30 mL    Miscellaneous Tube Feedin mL  Total IN: 60 mL    OUT:    Voided (mL): 200 mL  Total OUT: 200 mL    Total NET: -140 mL        ============================ LABS =========================                        8.8    8.36  )-----------( 297      ( 10 Aug 2021 00:51 )             28.6     08-10    131<L>  |  93<L>  |  4<L>  ----------------------------<  121<H>  3.9   |  25  |  0.54    Ca    9.7      10 Aug 2021 00:51  Phos  3.5     08-10  Mg     1.7     08-10    TPro  7.8  /  Alb  3.5  /  TBili  0.6  /  DBili  x   /  AST  33  /  ALT  30  /  AlkPhos  140<H>  0810    LIVER FUNCTIONS - ( 10 Aug 2021 00:51 )  Alb: 3.5 g/dL / Pro: 7.8 g/dL / ALK PHOS: 140 U/L / ALT: 30 U/L / AST: 33 U/L / GGT: x             ABG - ( 10 Aug 2021 00:35 )  pH, Arterial: 7.39  pH, Blood: x     /  pCO2: 50    /  pO2: 127   / HCO3: 29    / Base Excess: 4.0   /  SaO2: 99                  ======================Micro/Rad/Cardio=================  Culture: Reviewed   CXR: Reviewed  Echo:Reviewed  ======================================================  PAST MEDICAL & SURGICAL HISTORY:  CHF (congestive heart failure)    CAD (coronary artery disease)    Depression    Pleural effusion    History of 2019 novel coronavirus disease (COVID-19)  2020    Hemorrhoids    Bleeding hemorrhoids    Peripheral arterial disease    Claudication    BPH with urinary obstruction    ACC/AHA stage D systolic heart failure    Anticoagulation goal of INR 2.0 to 2.5    Falls    Clavicle fracture    CKD (chronic kidney disease), stage III    Iron deficiency anemia    H/O epistaxis    Vertigo    GI bleed    S/P TVR (tricuspid valve repair)    S/P ventricular assist device    S/P endoscopy      ====================ASSESSMENT ==============  Stage D Nonischemic Cardiomyopathy, Status Post HM2 on 2017    Cardiogenic shock  Hemodynamic instability   Acute hypoxemic respiratory failure  GI bleed , Status Post Enteroscopy   Anemia, in setting of melena   Chronic Kidney Disease  Stress hyperglycemia   C.diff positive on    Hypovolemic shock  Septic shock    Plan:  ====================== NEUROLOGY=====================  CTH on  with no acute intracranial hemorrhage or acute territorial infarction  Tylenol PRN for analgesia   Sleep regimen with Clonazepam   C/w Mirtazapine for depression  Psych consulted, recommended to continue current meds  Ketamine 30 IV x 1  PT as tolerated and ambulate     acetaminophen    Suspension .. 650 milliGRAM(s) Oral every 6 hours PRN Mild Pain (1 - 3)  clonazePAM  Tablet 0.5 milliGRAM(s) Oral at bedtime  mirtazapine 7.5 milliGRAM(s) Oral daily    ==================== RESPIRATORY======================  Acute hypoxemic respiratory failure s/p #8 shiley trach on ; tolerating TC since , continue as tolerated  Requiring intermittent full vent support, continue close monitoring of respiratory rate and breathing pattern, following of ABG’s with A-line monitoring, continuous pulse oximetry monitoring.   CT chest : Sub q emphysema in R chest wall, GGO RUL     Mechanical Ventilation:  Mode: OFF  FiO2: 40    ====================CARDIOVASCULAR==================  Stage D Nonischemic Cardiomyopathy, Status Post HM2 on 2017; LVAD settings 9200 with flow of 5.7  TTE : reveals at least moderate MR, mild AI, severe global LV syst dysfxn, dec RV fxn   Continue invasive hemodynamic monitoring   Propranolol 20 Q8 for rate control of HR 2/2 Hyperthyroidism, uptitrate as tolerated    ===================HEMATOLOGIC/ONC ===================  Anemia due to acute blood loss, monitor H&H/Plts    Continue monitoring daily LDH - No anticoagulation as he has had multiple GI bleeds    VTE prophylaxis, heparin   Injectable 5000 Unit(s) SubCutaneous every 8 hours    ===================== RENAL =========================  Continue monitoring urine output, I&OS, BUN/Cr   Diurese as needed  Replete lytes PRN. Keep K> 4 and Mg >2     ==================== GASTROINTESTINAL===================  CT A/P on : small ascites; ABD US showing GB wall thickening w/ surrounding pericholecystic fluid  S/p cholecystostomy tube placed on : with IR with -60cc of black bile, will monitor site closely.   Tolerating tube feeds @ 40cc/hr, goal 60, increase as tolerated  Recent emesis on  in setting of abdominal pain, as per GI likely component of illeus given critical illness   If worsening abdominal pain or vomiting, recommended CT A/P to r/o toxic megacolon - continue Reglan TID  Bowel regimen with Miralax and Senna   Zofran PRN for nausea     dextrose 5%. 1000 milliLiter(s) (25 mL/Hr) IV Continuous <Continuous>  GI prophylaxis, pantoprazole  Injectable 40 milliGRAM(s) IV Push every 12 hours  polyethylene glycol 3350 17 Gram(s) Oral daily  senna 2 Tablet(s) Oral at bedtime  ondansetron Injectable 4 milliGRAM(s) IV Push every 6 hours PRN Nausea and/or Vomiting  metoclopramide Injectable 10 milliGRAM(s) IV Push every 8 hours    =======================    ENDOCRINE  =====================  Stress hyperglycemia, continue glucose control with admelog sliding scale   Thyroid US to evaluate for thyroid nodule/goiter in setting of hyperthyroid  - contraindicated at this time 2/2 trach  Will continue methimazole and monitor TSH closely      insulin lispro (ADMELOG) corrective regimen sliding scale   SubCutaneous every 6 hours  methimazole 10 milliGRAM(s) Oral every 8 hours    ========================INFECTIOUS DISEASE================  Afebrile, WBC within normal limits  Continue trending WBC and monitoring fever curve   Off antibiotics      Patient requires continuous monitoring with bedside rhythm monitoring, pulse ox monitoring, and intermittent blood gas analysis. Care plan discussed with ICU care team. Patient remained critical and at risk for life threatening decompensation.     By signing my name below, I, Agnieszka Cherry, attest that this documentation has been prepared under the direction and in the presence of CLAUDIA Goldstein   Electronically signed: Cesia Shaffer, 08-10-21 @ 07:52    I, Cristóbal Cisneros, personally performed the services described in this documentation. all medical record entries made by the luisibmel were at my direction and in my presence. I have reviewed the chart and agree that the record reflects my personal performance and is accurate and complete  Electronically signed: CLAUDIA Goldstein

## 2021-08-10 NOTE — PROGRESS NOTE ADULT - PROBLEM SELECTOR PLAN 3
-unlikely related to low cardiac output  -GI input, RUQ sono 8/6 showed decompressed gallbladder and no focal fluid collection  -now improved, no need for CTA at this time

## 2021-08-10 NOTE — PROGRESS NOTE ADULT - PROBLEM SELECTOR PLAN 1
TSH-R ab and thyroid US pending.. Will continue Methimazole 10mg TID for now.   Suggest to continue beta blocker for tachycardia.  Will continue monitoring and FU.  Discussed plan with primary team.

## 2021-08-10 NOTE — PROGRESS NOTE ADULT - SUBJECTIVE AND OBJECTIVE BOX
RICKY JOINT  MRN#: [id number]  Subjective:  Pulmonary progress  : recurrent Acute hypoxic respiratory Failure ,aspiration pneumonia, NICM  , chart reviewed and H/O obtained radiological and Laboratory study reviewed patient Examined     65M PMH ACC/AHA stage D HF due to NICM HM2 LVAD , TV annuloplasty ring 17 as destination therapy due to severe peripheral artery disease with significant stenosis  SIADH, Depression, CKD-3 with hyperkalemia, past E. coli UTIs, driveline drainage (21) and COVID-19 (back in 2020)  He was recently seen in clinic where he complained of abdominal pain and dark stools w constipation back in May. He presents to [facility] ER today weakness and fatigue, moderate and + Black stools for three days, on coumadin secondary to warfarin use in the setting of an LVAD. Patient has required transfusions for GIB in the past. Mostly recently back in 2021 pt had anemia with dark stools. No interventions was done at that time. However Last Endoscopy was done in 2020 (negative). Today labs show patient is anemic with H/H of 4.5/16.3,. INR is 8.84 MAP in the 90s, Temp 35.1. He denies any chest pain, shortness of breath, dizziness, abd pain, nausea or vomiting. found to have  rectal bleeding underwent endoscopy ,old blood in the proximal ileum ,  develop sepsis with LL opacity given Antibiotics , Extubated , reintubated , Bronchoscopy on Zosyn for LL pneumonia  and Amiodarone S/P TV Annuloplasty , patient remain intubated on full ventilatory support .S/P multiple units of blood transfusion , remain on full ventilatory support on Precedex and propofol , new central IJ line , diarrhea C diff. +ve on po vancomycin and IV Flagyl,  mildly distended belly , fever start on cefepime 2gm q 8 hrs S/P tracheostomy .  new RT Subclavian central line continue on contact  isolation ,still diarrhea on C-diff antibiotics ENT follow up appreciated , trial of C-PAP as tolerated , , copious secretion from trach. site chest x ray left lower lobe pneumonia , tolerating trch. collar 50% FI02 still excessive secretion need pulmonary toilet , off Ancef antibiotic , no more diarrhea back on full support mechanical ventilator , chest x ray show improvement in LLL air space disease, more awake and responsive on tube feeding no more diarrhea , S/P  Ancef for bacteremia no fever ,ID follow up noted ,  no nausea or vomiting or diarrhea still very weak and tired , event note pulled NG tube now replaced , back on tube feeding ,still on po vancomycin , getting PT and OT at bed side , no plan for decannulation for now. , no more diarrhea receiving PT and OPT at bed side , minimal secretion from tracheostomy site , no SOB getting stronger , improve muscle tone patient transfer to monitor bed still on contact isolation for C-Difficel colitis on 50% FI02, NG tube clogged , and change to resume tube feeding still loose stool . H/H drop significantly require blood transfusion , most likely GI bleeding , IV heparin D/C , vomiting 200 cc of creamy color tube feeding on hold no sob, still melena monitor in the CTU possible capsule endoscopy , H/H is stable ., patient develop TR sided  pneumothorax require chest tube placement , RT IJ central line  placed , develop fever shaking chills , blood culture positive for serratia marcescens , start on cefepime .the patient  become hypoxic and hypotensive placed on full ventilatory support and Vasopressin , levophed and Dobutamine ,S/P blood transfusion on meropenem and vancomycin , IR note appreciated   , on and  off pressors , occasional agitation on Precedex .S/P IR cholecystostomy tube drainage placement in the RT upper Quadrant , resume anticoagulation chest x ray noted C-PAP trail lasted only for 2 hrs , new RT SC line and D/C RT IJ line , RT pig tail cathter has been removed , tolerating C-PAP trial placed on trach. collar 50% FI02 GI consultant noted on NG tube feeding as tolerated , develop AF RVR S/P  bolus Amiodarone  back to regular sinus Rhythm , Flat Affect depressed , back on tube feeding Vital AF at 40 cc/ hr .         (2021 16:57)    PAST MEDICAL & SURGICAL HISTORY:  CHF (congestive heart failure)    CAD (coronary artery disease)  Depression    Pleural effusion    History of 2019 novel coronavirus disease (COVID-19)  2020    Hemorrhoids    Bleeding hemorrhoids    Peripheral arterial disease    Claudication    BPH with urinary obstruction    ACC/AHA stage D systolic heart failure  Anticoagulation goal of INR 2.0 to 2.5    Falls    Clavicle fracture    CKD (chronic kidney disease), stage III    Iron deficiency anemia    H/O epistaxis    Vertigo    GI bleed    S/P TVR (tricuspid valve repair)    S/P ventricular assist device    S/P endoscopy    OBJECTIVE:  ICU Vital Signs Last 24 Hrs  T(C): 38.2 (2021 10:00), Max: 38.5 (2021 12:00)  T(F): 100.8 (2021 10:00), Max: 101.3 (2021 12:00)  HR: 65 (2021 10:00) (61 - 69)  BP: --  BP(mean): --  ABP: 105/67 (2021 10:00) (90/54 - 113/64)  ABP(mean): 77 (2021 10:00) (63 - 77)  RR: 20 (2021 10:00) (19 - 35)  SpO2: 99% (2021 10:00) (96% - 100%)       @ 07:01  -   @ 07:00  --------------------------------------------------------  IN: 2693.9 mL / OUT: 1415 mL / NET: 1278.9 mL     @ 07:01  -   @ 10:49  --------------------------------------------------------  IN: 420.8 mL / OUT: 115 mL / NET: 305.8 mL  PHYSICAL EXAM:Daily   Elderly male S/P tracheostomy   on trach-collar  50% FI02      Daily Weight in k.4 (2021 00:00)  HEENT:     + NCAT  + EOMI  - Conjuctival edema   - Icterus   - Thrush   - ETT  + NGT/OGT  Neck:         + FROM  RT SCl line JVD  - Nodes - Masses + Mid-line trachea + Tracheostomy  Chest:            normal A-P diameter    Lungs:          + CTA   + Rhonchi    - Rales    - Wheezing + Decreased  LT BS   - Dullness R L  Cardiac:       + S1 + S2    + RRR   - Irregular   - S3  - S4    - Murmurs   - Rub   - Hamman’s sign   Abdomen:    + BS  + Soft + Non-tender - Distended - Organomegaly - PEG .cholecystostomy tube in place  Extremities:   - Cyanosis U/L   - Clubbing  U/L  + LE/UE Edema   + Capillary refill    + Pulses   Neuro:        - Awake   -  Alert   - Confused   - Lethargic   + Sedated  + Generalized Weakness  Skin:        - Rashes    - Erythema   + Normal incisions   + IV sites intact          HOSPITAL MEDICATIONS: All medications reviewed and analyzed  MEDICATIONS  (STANDING):  amiodarone    Tablet 200 milliGRAM(s) Oral daily  chlorhexidine 0.12% Liquid 15 milliLiter(s) Oral Mucosa every 12 hours  chlorhexidine 2% Cloths 1 Application(s) Topical <User Schedule>  dexmedetomidine Infusion 0.5 MICROgram(s)/kG/Hr (9.81 mL/Hr) IV Continuous <Continuous>  dextrose 50% Injectable 50 milliLiter(s) IV Push every 15 minutes  heparin  Infusion 400 Unit(s)/Hr (12.5 mL/Hr) IV Continuous <Continuous>  Hydromorphone  Injectable 0.5 milliGRAM(s) IV Push once  insulin lispro (ADMELOG) corrective regimen sliding scale   SubCutaneous every 6 hours  pantoprazole  Injectable 40 milliGRAM(s) IV Push every 12 hours  piperacillin/tazobactam IVPB.. 3.375 Gram(s) IV Intermittent every 8 hours  propofol Infusion 20 MICROgram(s)/kG/Min (9.42 mL/Hr) IV Continuous <Continuous>  sodium chloride 0.9% lock flush 3 milliLiter(s) IV Push every 8 hours  sodium chloride 0.9%. 1000 milliLiter(s) (10 mL/Hr) IV Continuous <Continuous>    MEDICATIONS  (PRN):  acetaminophen    Suspension .. 650 milliGRAM(s) Oral every 6 hours PRN Temp greater or equal to 38C (100.4F)    LABS: All Lab data reviewed and analyzed                        8.8    8.36  )-----------( 297      ( 10 Aug 2021 00:51 )             28.6   08-10    131<L>  |  93<L>  |  4<L>  ----------------------------<  121<H>  3.9   |  25  |  0.54    Ca    9.7      10 Aug 2021 00:51  Phos  3.5     08-10  Mg     1.7     -10    TPro  7.8  /  Alb  3.5  /  TBili  0.6  /  DBili  x   /  AST  33  /  ALT  30  /  AlkPhos  140<H>  08-10      Ca    9.1      09 Aug 2021 00:33  Phos  3.3     08-09  Mg     1.5     -    TPro  7.7  /  Alb  3.6  /  TBili  0.6  /  DBili  x   /  AST  25  /  ALT  24  /  AlkPhos  137<H>                                                                                     PTT - ( 2021 04:52 )  PTT:45.2 sec LIVER FUNCTIONS - ( 2021 00:42 )  Alb: 3.4 g/dL / Pro: 6.7 g/dL / ALK PHOS: 213 U/L / ALT: 15 U/L / AST: 24 U/L / GGT: x           RADIOLOGY: - Reviewed and analyzed RT Pig tail cathter  , LVAD HM2, CT scan of abdomen reviewed result noted

## 2021-08-11 NOTE — PROGRESS NOTE ADULT - ASSESSMENT
Assessment  Hyperthyroidism: 66y Male with no history thyroid disease, was not on any thyroid supplements, previously euthyroid (June 2021), was on amiodarone (last dose 8/7), now in hyperthyroid state, started on Methimazole 10mg TID and propanolol for tachycardia, LFTs WNL, TPO ab slightly elevated, TSH-R ab WNL, thyroid US pending.  Hyperglycemia: Patient in nondiabetic range, A1C 5.6%, now on insulin coverage, blood sugars in acceptable range, no hypoglycemias, on TFs.  CHF: on medications, stable, monitored.  Anemia: Monitored      Pavan Barone MD  Cell: [phone]  Office: [phone]       Assessment  Hyperthyroidism: 66y Male with no history thyroid disease, was not on any thyroid supplements, previously euthyroid (June 2021), was on amiodarone (last dose 8/7), now in hyperthyroid state, started on Methimazole 10mg TID and  propanolol for tachycardia, LFTs WNL, TPO ab slightly elevated, TSH-R ab WNL, thyroid US pending.  Hyperglycemia: Patient in nondiabetic range, A1C 5.6%, now on insulin coverage, blood sugars in acceptable range, no hypoglycemias, on TFs.  CHF: on medications, stable, monitored.  Anemia: Monitored      Pavan Barone MD  Cell: [phone]  Office: [phone]

## 2021-08-11 NOTE — PROGRESS NOTE ADULT - ASSESSMENT
Assessment and Recommendation:   · Assessment	  Assessment and recommendation :  Recurrent Acute hypoxic respiratory Failure S/P tracheostomy on trach-collar  at 50% FI02  Acute blood loss anemia S/P multiple  blood transfusion   S/P septic shock off vasopressin , levophed and off  Dobutamine   S/P sepsis with serratia marcescens in the blood S/P  cefepime  S/P cholecystostomy tube placement by IR    AF RVR start on bolus Amiodarone   S/P Acute left lower lobe pneumonia   Aspiration pneumonia   Stool is  +ve for C-diff. colitis on PO vancomycin  improved  Non ischemic cardiomyopathy continue ACE inhibitor and B-Blockers   S/P Septic shock and cardiogenic shock   Stage D systolic heart failure S/P LVAD HM2   MH2 LVAD  with  TV Annuloplasty  Severe peripheral vascular disease   severe hyperglycemia on insulin coverage    cardiac arrythmia  on  Amiodarone   critical care polyneuropathy   Anemia of Acute blood Loss   severe protein caloric malnutrition   S/P Small bowel bleeding   S/P blood and FFP transfusion   Chronic kidney disease stage III   NGT feeding Vital at 60 cc/ hr  GI prophylaxis with PPI

## 2021-08-11 NOTE — CONSULT NOTE ADULT - SUBJECTIVE AND OBJECTIVE BOX
General Surgery Consult Note    History of Present Illness:    65M PMH ACC/AHA stage D HF due to NICM HM2 LVAD , TV annuloplasty ring 9/12/17 as destination therapy due to severe peripheral artery disease with significant stenosis  SIADH, Depression, CKD-3 with hyperkalemia, past E. coli UTIs, driveline drainage (1/7/21) and COVID-19 (back in April 2020)  He was recently seen in clinic where he complained of abdominal pain and dark stools w constipation back in May. He presents to [facility] ER today weakness and fatigue, moderate and + Black stools for three days, on coumadin secondary to warfarin use in the setting of an LVAD. Patient has required transfusions for GIB in the past. Mostly recently back in jan 2021 pt had anemia with dark stools. No interventions was done at that time. However Last Endoscopy was done in July 2020 (negative). Today labs show patient is anemic with H/H of 4.5/16.3,. INR is 8.84 MAP in the 90s, Temp 35.1. He denies any chest pain, shortness of breath, dizziness, abd pain, nausea or vomiting.      Patient was admitted 6/14 for GIB with elevated INR. He underwent multiple studies demonstrating old bleed in the small bowel but no source of active bleeding was identified. Hospital course has been complicated by intermittent GIB (AC has been discontinued for reccurent bleeding), aspiration with prolonged intubation s/p tracheostomy, c. diff + (s/p PO vancomycin course), serratia bacteremia and sepsis secondary to acalculous cholecystitis s/p percutaneous cholecystostomy tube placement. Patient has recovered from his sepsis and is currently doing well, melena has resolved, and he is ambulating and on trach collar. He is on continuous tube feeds and intermittently complains of abdominal pain, requiring feeds to be held. Most recently, his tube feeds were restarted 8/9 and slowly increasing to goal of 60cc/hr today. Today he reports lower and epigastric abdominal pain with associated nausea and no vomiting. Pain has improved since his tube feeds were held. He has daily bowel movements, (last this morning, loose and nonbloody) and he reports that he is passing flatus. He has been hemodynamically stable and labs are wnl.      Patient currently denies headache, dizziness, weakness, fevers, chills, shortness of breath, chest pain.    PAST MEDICAL HISTORY: No pertinent past medical history    CHF (congestive heart failure)    CAD (coronary artery disease)    Depression    Pleural effusion    History of 2019 novel coronavirus disease (COVID-19)    Hemorrhoids    Bleeding hemorrhoids    Peripheral arterial disease    Claudication    BPH with urinary obstruction    ACC/AHA stage D systolic heart failure    Anticoagulation goal of INR 2.0 to 2.5    Falls    Clavicle fracture    CKD (chronic kidney disease), stage III    Iron deficiency anemia    H/O epistaxis    Vertigo    GI bleed        PAST SURGICAL HISTORY: No significant past surgical history    S/P TVR (tricuspid valve repair)    S/P ventricular assist device    S/P endoscopy        HOME MEDICATIONS:    ALLERGIES: No Known Allergies      FAMILY HISTORY: No pertinent family history in first degree relatives        SOCIAL HISTORY:    REVIEW OF SYSTEMS:    VITAL SIGNS:  ICU Vital Signs Last 24 Hrs  T(C): 36.1 (11 Aug 2021 12:00), Max: 37.1 (11 Aug 2021 00:00)  T(F): 97 (11 Aug 2021 12:00), Max: 98.7 (11 Aug 2021 00:00)  HR: 117 (11 Aug 2021 15:14) (90 - 117)  BP: --  BP(mean): --  ABP: 92/67 (11 Aug 2021 14:00) (79/56 - 143/129)  ABP(mean): 77 (11 Aug 2021 14:00) (65 - 137)  RR: 19 (11 Aug 2021 15:00) (12 - 41)  SpO2: 98% (11 Aug 2021 15:14) (98% - 100%)      PHYSICAL EXAMINATION:  General - lying in bed, resting, in no acute distress  Neuro - awake, alert, oriented x4, no acute focal deficits  HEENT - normocephalic, PERRL, tracheostomy in place  Lungs - breathing comfortably on trach collar  Heart - regular rate and rhythm on monitor  Abdomen - softly distended, tender to palpation in LLQ; perc dawn in place draining bile  Extremities - all four extremities are warm & pink with 2+ pulses, strength 5/5, sensation intact    LABS:                          9.3    8.80  )-----------( 301      ( 11 Aug 2021 00:30 )             30.3       08-11    136  |  98  |  8   ----------------------------<  132<H>  4.4   |  26  |  0.55    Ca    9.6      11 Aug 2021 00:29  Phos  3.6     08-11  Mg     1.7     08-11    TPro  8.4<H>  /  Alb  3.7  /  TBili  0.5  /  DBili  x   /  AST  29  /  ALT  32  /  AlkPhos  145<H>  08-11          ABG - ( 11 Aug 2021 14:18 )  pH: 7.39  /  pCO2: 50    /  pO2: 125   / HCO3: 30    / Base Excess: 4.5   /  SaO2: 99      Lactate: x                      RECENT CULTURES:      CAPILLARY BLOOD GLUCOSE      POCT Blood Glucose.: 125 mg/dL (11 Aug 2021 12:24)  POCT Blood Glucose.: 120 mg/dL (11 Aug 2021 05:07)  POCT Blood Glucose.: 112 mg/dL (10 Aug 2021 17:29)      IMAGING STUDIES:  < from: CT Abdomen and Pelvis w/ IV Cont (07.28.21 @ 17:44) >  EXAM:  CT ABDOMEN AND PELVIS IC                          EXAM:  CT CHEST IC                            PROCEDURE DATE:  07/28/2021            INTERPRETATION:  CLINICAL INFORMATION: Sepsis.    COMPARISON: CT chest, abdomen and pelvis 6/22/2021    CONTRAST/COMPLICATIONS:  IV Contrast: Omnipaque 350  90 cc administered   10 cc discarded  Oral Contrast: None  Complications: None reported    PROCEDURE:  CT of the Chest, Abdomen and Pelvis was performed.  Sagittal and coronal reformats were performed.    FINDINGS:  CHEST:  LUNGS, PLEURA AND LARGE AIRWAYS: Tracheostomy tube noted. Secretions within the trachea. Emphysema. Trace bilateral pleural effusions with associated partial atelectasis of the lower lobes. Interval resolution of groundglass opacities in the left upper and lower lobes. A left lower lobe consolidation is noted. Interval placement of right-sided pigtail catheter with trace pneumothorax.  VESSELS: Within normal limits.  HEART: Heart size is enlarged. LVAD with partially thrombosed outflow tract as at prior CT. No evidence of fluid collection.  No pericardial effusion. Tricuspid valve replacement.  MEDIASTINUM AND DON: No lymphadenopathy.  CHEST WALL AND LOWER NECK: Sternotomy. Subcutaneous emphysema along right chest wall, postprocedural. Interval placement of right central venous catheter with tip in proximal right atrium.    ABDOMEN AND PELVIS:  LIVER: Within normal limits.  BILE DUCTS: Normal caliber.  GALLBLADDER: Within normal limits.  SPLEEN: Within normal limits.  PANCREAS: Within normal limits.  ADRENALS: Within normal limits.  KIDNEYS/URETERS: Within normal limits.    BLADDER: Landrum catheter.  REPRODUCTIVE ORGANS: Within normal limits.    BOWEL: Enteric tube with tip in stomach. No bowel obstruction.  PERITONEUM: Small volume abdominopelvic ascites, increased since 6/22/2021.  VESSELS: Vascular calcification of the aorta and its branches.  RETROPERITONEUM/LYMPH NODES: No lymphadenopathy.  ABDOMINAL WALL: Soft tissue swelling.  BONES: Degenerative changes.    IMPRESSION:    Left lower lobe consolidation representing a mix of atelectasis and infection.    No infectious etiology within the abdomen and pelvis.    --- End of Report ---              CÉSAR ANDERSEN MD; Resident Radiologist  This document has been electronically signed.  MARY SUAREZ MD; Attending Radiologist  This document has been electronically signed. Jul 29 2021 11:51AM    < end of copied text >      < from: Xray Abdomen 1 View PORTABLE -Urgent (Xray Abdomen 1 View PORTABLE -Urgent .) (08.08.21 @ 01:55) >  EXAM:  XR ABDOMEN PORTABLE URGENT 1V                            PROCEDURE DATE:  08/08/2021            INTERPRETATION:  Clinical Information: Status post heart surgery.    Technique: AP abdomen image.    Comparison: 08/04/2021    Findings/  Impression: Status post LVAD and cholecystostomy tube. NG tip in the stomach. Nonobstructive bowel gas pattern. No free air.    --- End of Report ---                KYLE SIEGEL MD; Attending Interventional Radiologist  This document has been electronically signed. Aug  8 2021  2:29PM    < end of copied text >

## 2021-08-11 NOTE — CONSULT NOTE ADULT - ATTENDING COMMENTS
does not appear to have an acute abdominal process  would consider abdominal CT scan to further evaluate

## 2021-08-11 NOTE — PROGRESS NOTE ADULT - ASSESSMENT
66M Hx stage D NICM s/p HM2 on 9/2017 as DT (due to severe PAD) with TV ring, prior COVID-19 infection 4/2020, recurrent syncope post LVAD s/p ILR, and chronic abdominal pain with prior negative workup who was admitted with symptomatic anemia and supratherapeutic INR.     #Abd pain: pt with vacillating history regarding abdominal pain (reports location at times at per dawn/LVAD site, other times epigastric region, notes left sided pain many years prior // endorses chronicity possibly many years prior vs noted to be possibly x 1 week according to team // unclear if associated with TF). Now having brown BMs, no further recent bleeding. Ddx unclear source of pain -- possibly MSK at site of perc dawn drain/LVAD pump vs can consider esophagitis in setting of long standing NGT vs IBS vs unlikely biliary in nature (perc dawn draining well, normal LFTs) vs unlikely chronic mesenteric ischemia (vessels patent on imaging) vs unlikely SMA syndrome - now only at perc dawn site  # Acute blood loss anemia and melena - hemodynamically unchanged. Likely had recurrent GI bleed this admission. Initial melena workup lead to capsule endoscopy on 6/14/2021 which revealed active bleeding in small bowel.  Single balloon 6/15/2021 revealed old blood in proximal ileum. 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  # LVAD on anticoagulation with coumadin  # Trach  # Tension pneumo s/p chest tube placement 7/26  # Septic shock - 2/2 serratia bacteremia 2/2 cholecystitis  # Acalculous cholecystitis - s/p perc dawn  #S/p Cdiff infection    Recommendations:  - c/w PPI BID in case esophagitis 2/2 NGT contributing to abdominal pain   - c/w TF as tolerated  - perc chol management per IR  - no further GI w/u needed at this time - pain seems to have improved and only at perc dawn site, tolerating TF without issues  - No further GI w/u needed        Susan Jacobo PGY-5  Gastroenterology Fellow  Pager #[phone] ([place]) or [phone] (NS)  Available on [employer] Teams.  Please contact on-call GI fellow via answering service ([phone]) after 5pm and before 8am, and on weekends.

## 2021-08-11 NOTE — CHART NOTE - NSCHARTNOTEFT_GEN_A_CORE
Nutrition Follow Up Note  Patient seen for: Malnutrition Follow Up     Chart reviewed, events noted. Noted pt now on trach collar since . Endocrine continues to follow and adjust insulin regimen as needed. Noted GI following, pt has complained of abdominal pain, previous emesis.     Source: [] Patient       [x] EMR        [x] RN        [] Family at bedside       [] Other:    -If unable to interview patient: [] Trach/Vent/BiPAP  [] Disoriented/confused/inappropriate to interview    Diet Order:   Diet, NPO with Tube Feed:   Tube Feeding Modality: Nasogastric  Vital AF (VITALAFRTH)  Total Volume for 24 Hours (mL): 1440  Continuous  Starting Tube Feed Rate {mL per Hour}: 10  Increase Tube Feed Rate by (mL): 10     Every 6 hours  Until Goal Tube Feed Rate (mL per Hour): 60  Tube Feed Duration (in Hours): 24  Tube Feed Start Time: 14:00  No Carb Prosource TF     Qty per Day:  2  Supplement Feeding Modality:  Nasogastric  Probiotic Yogurt/Smoothie Cans or Servings Per Day:  2       Frequency:  Daily (08-10-21)      EN Order Provides:1808cal, 130 Gm Prot; 23cal/kg and 1.7 Gm Prot/kg based on dosing wt of 78.5kg    Nutrition-related concerns:  -Noted formula was switched from Jevity 1.2 at 60ml/hr + "No Carb Prosource TF (1 pkg=11gm protein)" x2 packets to current regimen on   -Since last follow up, pt had reached rate of 50ml/hr of Jevity 1.2 by   -Pt has been refusing tube feeds at times, noted per flow sheets, pt refused feeds on -8  -Feeds were restarted on  at 10AM at 10ml/hr and has been increased gradually since then and pt did reach goal rate of 60ml/hr this morning at 8AM  -Noted per GI note, pt was unsure of abdominal pain was chronic, pain was indicated to be over LVAD area and near tubes  -Noted pt is on Reglan     GI:  Last BM .   Bowel Regimen? [x] Yes   [] No    Weight history:   - Admission weight: 176.3 pounds / 80 kg (6/15)  - Recent standing weight: 148.1 pounds / 67.2 kg ()     -significant wt loss noted     Weights:   Daily Weight in k.4 (), Weight in k.4 (08-10), Weight in k.4 (), Weight in k.5 (), Weight in k.2 (), Weight in k.1 ()  Would continue to monitor wt       MEDICATIONS  (STANDING):  insulin lispro (ADMELOG) corrective regimen sliding scale  methimazole  pantoprazole  Injectable  polyethylene glycol 3350  propranolol  senna    Pertinent Labs:  @ 00:29: Na 136, BUN 8, Cr 0.55, <H>, K+ 4.4, Phos 3.6, Mg 1.7, Alk Phos 145<H>, ALT/SGPT 32, AST/SGOT 29, HbA1c --    A1C with Estimated Average Glucose Result: 5.6 % (06-15-21 @ 02:42)    Finger Sticks:  POCT Blood Glucose.: 120 mg/dL ( @ 05:07)  POCT Blood Glucose.: 112 mg/dL (08-10 @ 17:29)  POCT Blood Glucose.: 120 mg/dL (08-10 @ 12:08)    Triglycerides, Serum: 679 mg/dL (21 @ 13:17)      Skin per nursing documentation: no pressure injuries   Edema: none at this time     Estimated Nutrition Needs:   - Using dosing weight 78.5 kg, considering vent support  - Energy Needs (25-30 kcal/kg): 3522-9045 kcal/day  - Protein Needs (1.2-1.4 g/kg):     Previous Nutrition Diagnosis: Severe chronic malnutrition  Nutrition diagnosis is ongoing & addressed with tube feeds as tolerated    No new nutrition diagnosis at this time         Recommendations:      1. Given pt complained of tolerance issues, consider continuing with Vital AF at 60ml/hr and can consider DC "No Carb Prosource TF (1 pkg=11gm protein)" at this time.  2. RD remains available for further nutritional interventions as needed.     Monitoring and Evaluation:   Continue to monitor nutritional intake, tolerance to diet prescription, weights, labs, skin integrity      RD remains available upon request and will follow up per protocol  Kellie Ellis, MS RD CDN [facility], Pager #[phone] Nutrition Follow Up Note  Patient seen for: Malnutrition Follow Up     Chart reviewed, events noted. Noted pt now on trach collar since . Endocrine continues to follow and adjust insulin regimen as needed. Noted GI following, pt has complained of abdominal pain, previous emesis.     Source: [] Patient       [x] EMR        [x] RN        [] Family at bedside       [] Other:    -If unable to interview patient: [] Trach/Vent/BiPAP  [] Disoriented/confused/inappropriate to interview    Diet Order:   Diet, NPO with Tube Feed:   Tube Feeding Modality: Nasogastric  Vital AF (VITALAFRTH)  Total Volume for 24 Hours (mL): 1440  Continuous  Starting Tube Feed Rate {mL per Hour}: 10  Increase Tube Feed Rate by (mL): 10     Every 6 hours  Until Goal Tube Feed Rate (mL per Hour): 60  Tube Feed Duration (in Hours): 24  Tube Feed Start Time: 14:00  No Carb Prosource TF     Qty per Day:  2  Supplement Feeding Modality:  Nasogastric  Probiotic Yogurt/Smoothie Cans or Servings Per Day:  2       Frequency:  Daily (08-10-21)      EN Order Provides:1808cal, 130 Gm Prot; 23cal/kg and 1.7 Gm Prot/kg based on dosing wt of 78.5kg    Nutrition-related concerns:  -Noted formula was switched from Jevity 1.2 at 60ml/hr + "No Carb Prosource TF (1 pkg=11gm protein)" x2 packets to current regimen on   -Since last follow up, pt had reached rate of 50ml/hr of Jevity 1.2 by   -Pt has been refusing tube feeds at times, noted per flow sheets, pt refused feeds on -8  -Feeds were restarted on  at 10AM at 10ml/hr and has been increased gradually since then and pt did reach goal rate of 60ml/hr this morning at 8AM; noted pt has not been refusing tube feeds since change in formula from Jevity 1.2 to Vital AF   -Noted per GI note, pt was unsure of abdominal pain was chronic, pain was indicated to be over LVAD area and near tubes  -Noted pt is on Reglan   -As per PA, pt has also been refusing Probiotic supplement, may consider DC at this time     GI:  Last BM .   Bowel Regimen? [x] Yes   [] No    Weight history:   - Admission weight: 176.3 pounds / 80 kg (6/15)  - Recent standing weight: 148.1 pounds / 67.2 kg ()     -significant wt loss noted     Weights:   Daily Weight in k.4 (), Weight in k.4 (08-10), Weight in k.4 (), Weight in k.5 (), Weight in k.2 (), Weight in k.1 ()  Would continue to monitor wt       MEDICATIONS  (STANDING):  insulin lispro (ADMELOG) corrective regimen sliding scale  methimazole  pantoprazole  Injectable  polyethylene glycol 3350  propranolol  senna    Pertinent Labs:  @ 00:29: Na 136, BUN 8, Cr 0.55, <H>, K+ 4.4, Phos 3.6, Mg 1.7, Alk Phos 145<H>, ALT/SGPT 32, AST/SGOT 29, HbA1c --    A1C with Estimated Average Glucose Result: 5.6 % (06-15-21 @ 02:42)    Finger Sticks:  POCT Blood Glucose.: 120 mg/dL ( @ 05:07)  POCT Blood Glucose.: 112 mg/dL (08-10 @ 17:29)  POCT Blood Glucose.: 120 mg/dL (08-10 @ 12:08)    Triglycerides, Serum: 679 mg/dL (21 @ 13:17)      Skin per nursing documentation: no pressure injuries   Edema: none at this time     Estimated Nutrition Needs:   - Using dosing weight 78.5 kg, considering vent support  - Energy Needs (25-30 kcal/kg): 7131-0724 kcal/day  - Protein Needs (1.2-1.4 g/kg):     Previous Nutrition Diagnosis: Severe chronic malnutrition  Nutrition diagnosis is ongoing & addressed with tube feeds as tolerated    No new nutrition diagnosis at this time         Recommendations:      1. Given pt complained of tolerance issues, consider continuing with Vital AF at 60ml/hr and can consider DC "No Carb Prosource TF (1 pkg=11gm protein)" at this time. Given pt's refusal, would consider DC Probiotic supplement at this time in order to promote acceptance of tube feeds.   2. RD remains available for further nutritional interventions as needed.     Monitoring and Evaluation:   Continue to monitor nutritional intake, tolerance to diet prescription, weights, labs, skin integrity      RD remains available upon request and will follow up per protocol  Kellie Ellis MS RD CDN [facility], Pager #[phone]

## 2021-08-11 NOTE — PROGRESS NOTE ADULT - SUBJECTIVE AND OBJECTIVE BOX
Chief complaint  Patient is a 66y old  Male who presents with a chief complaint of Anemia, Supratherapeutic INR, Dark Stools (11 Aug 2021 11:45)   Review of systems  Patient in bed, looks comfortable, no hypoglycemic episodes.    Labs and Fingersticks  CAPILLARY BLOOD GLUCOSE      POCT Blood Glucose.: 125 mg/dL (11 Aug 2021 12:24)  POCT Blood Glucose.: 120 mg/dL (11 Aug 2021 05:07)  POCT Blood Glucose.: 112 mg/dL (10 Aug 2021 17:29)      Anion Gap, Serum: 12 (08-11 @ 00:29)  Anion Gap, Serum: 13 (08-10 @ 00:51)      Calcium, Total Serum: 9.6 (08-11 @ 00:29)  Calcium, Total Serum: 9.7 (08-10 @ 00:51)  Albumin, Serum: 3.7 (08-11 @ 00:29)  Albumin, Serum: 3.5 (08-10 @ 00:51)    Alanine Aminotransferase (ALT/SGPT): 32 (08-11 @ 00:29)  Alanine Aminotransferase (ALT/SGPT): 30 (08-10 @ 00:51)  Alkaline Phosphatase, Serum: 145 *H* (08-11 @ 00:29)  Alkaline Phosphatase, Serum: 140 *H* (08-10 @ 00:51)  Aspartate Aminotransferase (AST/SGOT): 29 (08-11 @ 00:29)  Aspartate Aminotransferase (AST/SGOT): 33 (08-10 @ 00:51)        08-11    136  |  98  |  8   ----------------------------<  132<H>  4.4   |  26  |  0.55    Ca    9.6      11 Aug 2021 00:29  Phos  3.6     08-11  Mg     1.7     08-11    TPro  8.4<H>  /  Alb  3.7  /  TBili  0.5  /  DBili  x   /  AST  29  /  ALT  32  /  AlkPhos  145<H>  08-11                        9.3    8.80  )-----------( 301      ( 11 Aug 2021 00:30 )             30.3     Medications  MEDICATIONS  (STANDING):  chlorhexidine 0.12% Liquid 15 milliLiter(s) Oral Mucosa every 12 hours  clonazePAM  Tablet 0.5 milliGRAM(s) Oral at bedtime  heparin   Injectable 5000 Unit(s) SubCutaneous every 8 hours  insulin lispro (ADMELOG) corrective regimen sliding scale   SubCutaneous every 6 hours  methimazole 10 milliGRAM(s) Oral every 8 hours  metoclopramide Injectable 10 milliGRAM(s) IV Push every 8 hours  mirtazapine 7.5 milliGRAM(s) Oral daily  pantoprazole  Injectable 40 milliGRAM(s) IV Push every 12 hours  polyethylene glycol 3350 17 Gram(s) Oral daily  propranolol 20 milliGRAM(s) Oral every 8 hours  senna 2 Tablet(s) Oral at bedtime      Physical Exam  General: Patient comfortable in bed  Vital Signs Last 12 Hrs  T(F): 97 (08-11-21 @ 12:00), Max: 98.7 (08-11-21 @ 08:00)  HR: 113 (08-11-21 @ 14:00) (90 - 113)  BP: --  BP(mean): --  RR: 20 (08-11-21 @ 14:00) (12 - 30)  SpO2: 98% (08-11-21 @ 14:00) (98% - 100%)  Neck: No palpable thyroid nodules.  CVS: S1S2, No murmurs  Respiratory: No wheezing, no crepitations  GI: Abdomen soft, bowel sounds positive  Musculoskeletal:  edema lower extremities.   Skin: No skin rashes, no ecchymosis    Diagnostics    US Thyroid: Routine   Indication: hyperthyroidism  Transport: Stretcher-Crib,  w/ Monitor  Provider's Contact #: [phone] (08-08 @ 16:13)  TSH Receptor Antibody: AM Sched. Collection: 09-Aug-2021 06:00 (08-08 @ 13:31)  Thyroperoxidase Antibody: AM Sched. Collection: 09-Aug-2021 06:00 (08-08 @ 13:31)           Chief complaint  Patient is a 66y old  Male who presents with a chief complaint of Anemia, Supratherapeutic INR, Dark Stools (11 Aug 2021 11:45)   Review of systems  Patient in bed, looks comfortable, no hypoglycemic episodes.    Labs and Fingersticks  CAPILLARY BLOOD GLUCOSE      POCT Blood Glucose.: 125 mg/dL (11 Aug 2021 12:24)  POCT Blood Glucose.: 120 mg/dL (11 Aug 2021 05:07)  POCT Blood Glucose.: 112 mg/dL (10 Aug 2021 17:29)      Anion Gap, Serum: 12 (08-11 @ 00:29)  Anion Gap, Serum: 13 (08-10 @ 00:51)      Calcium, Total Serum: 9.6 (08-11 @ 00:29)  Calcium, Total Serum: 9.7 (08-10 @ 00:51)  Albumin, Serum: 3.7 (08-11 @ 00:29)  Albumin, Serum: 3.5 (08-10 @ 00:51)    Alanine Aminotransferase (ALT/SGPT): 32 (08-11 @ 00:29)  Alanine Aminotransferase (ALT/SGPT): 30 (08-10 @ 00:51)  Alkaline Phosphatase, Serum: 145 *H* (08-11 @ 00:29)  Alkaline Phosphatase, Serum: 140 *H* (08-10 @ 00:51)  Aspartate Aminotransferase (AST/SGOT): 29 (08-11 @ 00:29)  Aspartate Aminotransferase (AST/SGOT): 33 (08-10 @ 00:51)        08-11    136  |  98  |  8   ----------------------------<  132<H>  4.4   |  26  |  0.55    Ca    9.6      11 Aug 2021 00:29  Phos  3.6     08-11  Mg     1.7     08-11    TPro  8.4<H>  /  Alb  3.7  /  TBili  0.5  /  DBili  x   /  AST  29  /  ALT  32  /  AlkPhos  145<H>  08-11                        9.3    8.80  )-----------( 301      ( 11 Aug 2021 00:30 )             30.3     Medications  MEDICATIONS  (STANDING):  chlorhexidine 0.12% Liquid 15 milliLiter(s) Oral Mucosa every 12 hours  clonazePAM  Tablet 0.5 milliGRAM(s) Oral at bedtime  heparin   Injectable 5000 Unit(s) SubCutaneous every 8 hours  insulin lispro (ADMELOG) corrective regimen sliding scale   SubCutaneous every 6 hours  methimazole 10 milliGRAM(s) Oral every 8 hours  metoclopramide Injectable 10 milliGRAM(s) IV Push every 8 hours  mirtazapine 7.5 milliGRAM(s) Oral daily  pantoprazole  Injectable 40 milliGRAM(s) IV Push every 12 hours  polyethylene glycol 3350 17 Gram(s) Oral daily  propranolol 20 milliGRAM(s) Oral every 8 hours  senna 2 Tablet(s) Oral at bedtime      Physical Exam  General: Patient comfortable in bed  Vital Signs Last 12 Hrs  T(F): 97 (08-11-21 @ 12:00), Max: 98.7 (08-11-21 @ 08:00)  HR: 113 (08-11-21 @ 14:00) (90 - 113)  BP: --  BP(mean): --  RR: 20 (08-11-21 @ 14:00) (12 - 30)  SpO2: 98% (08-11-21 @ 14:00) (98% - 100%)  Neck: No palpable thyroid nodules.  CVS: S1S2, No murmurs  Respiratory: No wheezing, no crepitations  GI: Abdomen soft, bowel sounds positive  Musculoskeletal:  edema lower extremities.   Skin: No skin rashes, no ecchymosis    Diagnostics    US Thyroid: Routine   Indication: hyperthyroidism  Transport: Stretcher-Crib,  w/ Monitor  Provider's Contact #: [phone] (08-08 @ 16:13)  TSH Receptor Antibody: AM Sched. Collection: 09-Aug-2021 06:00 (08-08 @ 13:31)  Thyroperoxidase Antibody: AM Sched. Collection: 09-Aug-2021 06:00 (08-08 @ 13:31)

## 2021-08-11 NOTE — PROGRESS NOTE ADULT - SUBJECTIVE AND OBJECTIVE BOX
RICKY HAMPTON  MRN-[id number]  Patient is a 66y old  Male who presents with a chief complaint of Anemia, Supratherapeutic INR, Dark Stools (10 Aug 2021 18:27)    HPI:  65M PMH ACC/AHA stage D HF due to NICM HM2 LVAD , TV annuloplasty ring 17 as destination therapy due to severe peripheral artery disease with significant stenosis  SIADH, Depression, CKD-3 with hyperkalemia, past E. coli UTIs, driveline drainage (21) and COVID-19 (back in 2020)  He was recently seen in clinic where he complained of abdominal pain and dark stools w constipation back in May. He presents to [facility] ER today weakness and fatigue, moderate and + Black stools for three days, on coumadin secondary to warfarin use in the setting of an LVAD. Patient has required transfusions for GIB in the past. Mostly recently back in 2021 pt had anemia with dark stools. No interventions was done at that time. However Last Endoscopy was done in 2020 (negative). Today labs show patient is anemic with H/H of 4.5/16.3,. INR is 8.84 MAP in the 90s, Temp 35.1. He denies any chest pain, shortness of breath, dizziness, abd pain, nausea or vomiting.       (2021 16:57)      Surgery/Hospital Course:   admit for melena w/ anemia, INR 8.84   6/15 Capsul study (+) for small bowel bleed, balloon endoscopy (old blood in prox ileum); post EGD - septic w/ L opacity, re-intubated for concern for aspiration, TTE (Mod MR, decrease biV w/ interventricular septum boweing towards R)   bronch    +C Diff    TC    CT C/A/P: Fluid filled colon which may be 2/2 rapid transit. Small bilateral pleffs with associates. Compressive atelectasis New ISABELLE & LLL  parenchymal opacities, suspicious for pneumonia. 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    #8 Shiley trach at bedside    CPAP trials    LVAD speed increased to 9200   Bronch; Central line dc'ed   TC since , continue as tolerated. Patient transferred to SDU.    Cont PT, no trach downsize today(#8cuffed)/ Anticoagulation/ Continue TF, speech f/u/ Vanco taper    oob to chair  INR  2.47  5 mg coumadin     increased lop to 25 q8  per HF   INR today 2.64.  H&H 7.3 this AM.  Will repeat CBC at noon, and will send stool guaiac Patient with persistent abdominal tenderness, rate of tube feeds decreased.  No nausea/vomiting.     INR today 2.4H&H 9.1/.6 low flow overnight /N&V  NPO resolved for capsule study  Coumadin on hold refusing Tube feeds on D5  normal  @50 cc/hr   INR 2.69  H&H 7..1 refusing Tube feeds on D5  normal  @50 cc/hr. This am + BM Anusha Oneill HF  aware- PRBC x1  GI team consulted -  NPO  plan on study in am-  D/w Dr Cadet Patient  to return to CTU for further management; 1PRBCS    Post op INR 2.2 today.  No bleeding. BC + for SM.  Pt is hypotensive requiring pressor and inotropic support.  ID follow up today on Cefepime will follow.   R PTC for PTX    CT C/A/P: sub q emphysema in R chest wall, GGO RUL, small ascites CTH negative; Abd US: GB thickening, pericholecystic fluid     Perchole drain in place continues to drain total output overnight 133.  Fever today 38.8 given tylenol.  Will repeat BC now.     duplex LE negative    Patient with persistent abdominal pain, refusing tube feeds and medications, Psych consulted    Today:    REVIEW OF SYSTEMS:  Unable to obtain secondary to pt being trached    ICU Vital Signs Last 24 Hrs  T(C): 37 (11 Aug 2021 04:00), Max: 37.1 (10 Aug 2021 08:00)  T(F): 98.6 (11 Aug 2021 04:00), Max: 98.8 (10 Aug 2021 08:00)  HR: 100 (11 Aug 2021 07:00) (90 - 133)  BP: --  BP(mean): --  ABP: 91/68 (11 Aug 2021 07:00) (79/56 - 143/129)  ABP(mean): 78 (11 Aug 2021 07:00) (65 - 137)  RR: 28 (11 Aug 2021 07:00) (8 - 41)  SpO2: 100% (11 Aug 2021 07:00) (93% - 100%)      Physical Exam:  Gen:  awake and alert, NAD  CNS:  intact, nonfocal, responds to all commands  Neck: no JVD, +TC   RES : course breath sounds, no wheezing              CVS: +LVAD hum   Abd: Soft, minimally-moderately tender in RUQ by perc dawn site, NT everywhere else, positive BS throughout. Perc dawn drain site c/d/i draining bilious fluid.  Skin: No rash  Ext:  no edema    ============================I/O===========================   I&O's Detail    10 Aug 2021 07:01  -  11 Aug 2021 07:00  --------------------------------------------------------  IN:    dextrose 5%: 90 mL    Enteral Tube Flush: 160 mL    IV PiggyBack: 100 mL    Miscellaneous Tube Feedin mL  Total IN: 1340 mL    OUT:    Drain (mL): 450 mL    Voided (mL): 650 mL  Total OUT: 1100 mL    Total NET: 240 mL        ============================ LABS =========================                        9.3    8.80  )-----------( 301      ( 11 Aug 2021 00:30 )             30.3     08-11    136  |  98  |  8   ----------------------------<  132<H>  4.4   |  26  |  0.55    Ca    9.6      11 Aug 2021 00:29  Phos  3.6       Mg     1.7         TPro  8.4<H>  /  Alb  3.7  /  TBili  0.5  /  DBili  x   /  AST  29  /  ALT  32  /  AlkPhos  145<H>      LIVER FUNCTIONS - ( 11 Aug 2021 00:29 )  Alb: 3.7 g/dL / Pro: 8.4 g/dL / ALK PHOS: 145 U/L / ALT: 32 U/L / AST: 29 U/L / GGT: x             ABG - ( 11 Aug 2021 00:07 )  pH, Arterial: 7.39  pH, Blood: x     /  pCO2: 50    /  pO2: 161   / HCO3: 29    / Base Excess: 4.2   /  SaO2: 100                 ======================Micro/Rad/Cardio=================  Culture: Reviewed   CXR: Reviewed  Echo:Reviewed  ======================================================  PAST MEDICAL & SURGICAL HISTORY:  CHF (congestive heart failure)    CAD (coronary artery disease)    Depression    Pleural effusion    History of 2019 novel coronavirus disease (COVID-19)  2020    Hemorrhoids    Bleeding hemorrhoids    Peripheral arterial disease    Claudication    BPH with urinary obstruction    ACC/AHA stage D systolic heart failure    Anticoagulation goal of INR 2.0 to 2.5    Falls    Clavicle fracture    CKD (chronic kidney disease), stage III    Iron deficiency anemia    H/O epistaxis    Vertigo    GI bleed    S/P TVR (tricuspid valve repair)    S/P ventricular assist device    S/P endoscopy      ====================ASSESSMENT ==============  Stage D Nonischemic Cardiomyopathy, Status Post HM2 on 2017    Cardiogenic shock  Hemodynamic instability   Acute hypoxemic respiratory failure  GI bleed , Status Post Enteroscopy   Anemia, in setting of melena   Chronic Kidney Disease  Stress hyperglycemia   C.diff positive on    Hypovolemic shock  Septic shock    Plan:  ====================== NEUROLOGY=====================  CTH on  with no acute intracranial hemorrhage or acute territorial infarction  Tylenol PRN for analgesia   Sleep regimen with Clonazepam   C/w Mirtazapine for depression  Psych recommended to continue current meds  PT as tolerated and ambulate    acetaminophen    Suspension .. 650 milliGRAM(s) Oral every 6 hours PRN Mild Pain (1 - 3)  clonazePAM  Tablet 0.5 milliGRAM(s) Oral at bedtime  mirtazapine 7.5 milliGRAM(s) Oral daily    ==================== RESPIRATORY======================  Acute hypoxemic respiratory failure s/p #8 shiley trach on ; tolerating TC since , continue as tolerated  Requiring intermittent full vent support, continue close monitoring of respiratory rate and breathing pattern, following of ABG’s with A-line monitoring, continuous pulse oximetry monitoring.   CT chest : Sub q emphysema in R chest wall, GGO RUL     Mechanical Ventilation:  Mode: standby    ====================CARDIOVASCULAR==================  Stage D Nonischemic Cardiomyopathy, Status Post HM2 on 2017; LVAD settings 9200 with flow of 5.1  TTE : reveals at least moderate MR, mild AI, severe global LV syst dysfxn, dec RV fxn   Continue invasive hemodynamic monitoring   Propranolol for rate control of HR 2/2 Hyperthyroidism, uptitrate as tolerated    propranolol 20 milliGRAM(s) Oral every 8 hours    ===================HEMATOLOGIC/ONC ===================  Anemia due to acute blood loss, monitor H&H/Plts    Continue monitoring daily LDH - No anticoagulation as he has had multiple GI bleeds    VTE prophylaxis, heparin   Injectable 5000 Unit(s) SubCutaneous every 8 hours    ===================== RENAL =========================  Continue monitoring urine output, I&OS, BUN/Cr   Replete lytes PRN. Keep K> 4 and Mg >2   Diurese as needed     ==================== GASTROINTESTINAL===================  CT A/P on : small ascites; ABD US showing GB wall thickening w/ surrounding pericholecystic fluid  S/p cholecystostomy tube placed on : with IR with -60cc of black bile, will monitor site closely.   Tolerating tube feeds @ 40cc/hr, goal 60, increase as tolerated  Recent emesis on  in setting of abdominal pain, as per GI likely component of illeus given critical illness   If worsening abdominal pain or vomiting, recommended CT A/P to r/o toxic megacolon - continue Reglan TID  Bowel regimen with Miralax and Senna   Zofran PRN for nausea     GI prophylaxis, pantoprazole  Injectable 40 milliGRAM(s) IV Push every 12 hours  polyethylene glycol 3350 17 Gram(s) Oral daily  senna 2 Tablet(s) Oral at bedtime  ondansetron Injectable 4 milliGRAM(s) IV Push every 6 hours PRN Nausea and/or Vomiting  metoclopramide Injectable 10 milliGRAM(s) IV Push every 8 hours    =======================    ENDOCRINE  =====================  Stress hyperglycemia, continue glucose control with admelog sliding scale   Thyroid US to evaluate for thyroid nodule/goiter in setting of hyperthyroid  - contraindicated at this time 2/2 trach  Will continue methimazole and monitor TSH closely      insulin lispro (ADMELOG) corrective regimen sliding scale   SubCutaneous every 6 hours  methimazole 10 milliGRAM(s) Oral every 8 hours    ========================INFECTIOUS DISEASE================  Afebrile, WBC within normal limits  Continue trending WBC and monitoring fever curve       Patient requires continuous monitoring with bedside rhythm monitoring, pulse ox monitoring, and intermittent blood gas analysis. Care plan discussed with ICU care team. Patient remained critical and at risk for life threatening decompensation.     By signing my name below, I, Agnieszka Cherry, attest that this documentation has been prepared under the direction and in the presence of CLAUDIA Goldstein   Electronically signed: Cesia Shaffer, 21 @ 07:26    I, Cristóbal Cisneros, personally performed the services described in this documentation. all medical record entries made by the luisibe were at my direction and in my presence. I have reviewed the chart and agree that the record reflects my personal performance and is accurate and complete  Electronically signed: CLAUDIA Goldstein        RICKY HAMPTON  MRN-[id number]  Patient is a 66y old  Male who presents with a chief complaint of Anemia, Supratherapeutic INR, Dark Stools (10 Aug 2021 18:27)    HPI:  65M PMH ACC/AHA stage D HF due to NICM HM2 LVAD , TV annuloplasty ring 17 as destination therapy due to severe peripheral artery disease with significant stenosis  SIADH, Depression, CKD-3 with hyperkalemia, past E. coli UTIs, driveline drainage (21) and COVID-19 (back in 2020)  He was recently seen in clinic where he complained of abdominal pain and dark stools w constipation back in May. He presents to [facility] ER today weakness and fatigue, moderate and + Black stools for three days, on coumadin secondary to warfarin use in the setting of an LVAD. Patient has required transfusions for GIB in the past. Mostly recently back in 2021 pt had anemia with dark stools. No interventions was done at that time. However Last Endoscopy was done in 2020 (negative). Today labs show patient is anemic with H/H of 4.5/16.3,. INR is 8.84 MAP in the 90s, Temp 35.1. He denies any chest pain, shortness of breath, dizziness, abd pain, nausea or vomiting.       (2021 16:57)      Surgery/Hospital Course:   admit for melena w/ anemia, INR 8.84   6/15 Capsul study (+) for small bowel bleed, balloon endoscopy (old blood in prox ileum); post EGD - septic w/ L opacity, re-intubated for concern for aspiration, TTE (Mod MR, decrease biV w/ interventricular septum boweing towards R)   bronch    +C Diff    TC    CT C/A/P: Fluid filled colon which may be 2/2 rapid transit. Small bilateral pleffs with associates. Compressive atelectasis New ISABELLE & LLL  parenchymal opacities, suspicious for pneumonia. 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    #8 Shiley trach at bedside    CPAP trials    LVAD speed increased to 9200   Bronch; Central line dc'ed   TC since , continue as tolerated. Patient transferred to SDU.    Cont PT, no trach downsize today(#8cuffed)/ Anticoagulation/ Continue TF, speech f/u/ Vanco taper    oob to chair  INR  2.47  5 mg coumadin     increased lop to 25 q8  per HF   INR today 2.64.  H&H 7.3/24 this AM.  Will repeat CBC at noon, and will send stool guaiac Patient with persistent abdominal tenderness, rate of tube feeds decreased.  No nausea/vomiting.     INR today 2.4H&H 9.1/.6 low flow overnight /N&V  NPO resolved for capsule study  Coumadin on hold refusing Tube feeds on D5  normal  @50 cc/hr   INR 2.69  H&H 7..1 refusing Tube feeds on D5 2 normal  @50 cc/hr. This am + BM Anusha Oneill   aware- PRBC x1  GI team consulted -  NPO  plan on study in am-  D/w Dr Cadet Patient  to return to CTU for further management; 1PRBCS    Post op INR 2.2 today.  No bleeding. BC + for SM.  Pt is hypotensive requiring pressor and inotropic support.  ID follow up today on Cefepime will follow.   R PTC for PTX    CT C/A/P: sub q emphysema in R chest wall, GGO RUL, small ascites CTH negative; Abd US: GB thickening, pericholecystic fluid     Perchole drain in place continues to drain total output overnight 133.  Fever today 38.8 given tylenol.  Will repeat BC now.     duplex LE negative    Patient with persistent abdominal pain, refusing tube feeds and medications, Psych consulted    Today:  Tolerating TC since , continue as tolerated. PO Lasix 20mg x1 for wet CXR this AM, will monitor K closely. Patient is a candidate for transfer to stepdown.     REVIEW OF SYSTEMS:  Unable to obtain secondary to pt being trached    ICU Vital Signs Last 24 Hrs  T(C): 37 (11 Aug 2021 04:00), Max: 37.1 (10 Aug 2021 08:00)  T(F): 98.6 (11 Aug 2021 04:00), Max: 98.8 (10 Aug 2021 08:00)  HR: 100 (11 Aug 2021 07:00) (90 - 133)  BP: --  BP(mean): --  ABP: 91/68 (11 Aug 2021 07:00) (79/56 - 143/129)  ABP(mean): 78 (11 Aug 2021 07:00) (65 - 137)  RR: 28 (11 Aug 2021 07:00) (8 - 41)  SpO2: 100% (11 Aug 2021 07:00) (93% - 100%)      Physical Exam:  Gen:  awake and alert, NAD  CNS:  intact, nonfocal, responds to all commands  Neck: no JVD, +TC   RES : course breath sounds, no wheezing              CVS: +LVAD hum   Abd: Soft, minimally-moderately tender in RUQ by perc dawn site, NT everywhere else, positive BS throughout. Perc dawn drain site c/d/i draining bilious fluid.  Skin: No rash  Ext:  no edema    ============================I/O===========================   I&O's Detail    10 Aug 2021 07:01  -  11 Aug 2021 07:00  --------------------------------------------------------  IN:    dextrose 5%: 90 mL    Enteral Tube Flush: 160 mL    IV PiggyBack: 100 mL    Miscellaneous Tube Feedin mL  Total IN: 1340 mL    OUT:    Drain (mL): 450 mL    Voided (mL): 650 mL  Total OUT: 1100 mL    Total NET: 240 mL        ============================ LABS =========================                        9.3    8.80  )-----------( 301      ( 11 Aug 2021 00:30 )             30.3     08-11    136  |  98  |  8   ----------------------------<  132<H>  4.4   |  26  |  0.55    Ca    9.6      11 Aug 2021 00:29  Phos  3.6       Mg     1.7         TPro  8.4<H>  /  Alb  3.7  /  TBili  0.5  /  DBili  x   /  AST  29  /  ALT  32  /  AlkPhos  145<H>      LIVER FUNCTIONS - ( 11 Aug 2021 00:29 )  Alb: 3.7 g/dL / Pro: 8.4 g/dL / ALK PHOS: 145 U/L / ALT: 32 U/L / AST: 29 U/L / GGT: x             ABG - ( 11 Aug 2021 00:07 )  pH, Arterial: 7.39  pH, Blood: x     /  pCO2: 50    /  pO2: 161   / HCO3: 29    / Base Excess: 4.2   /  SaO2: 100                 ======================Micro/Rad/Cardio=================  Culture: Reviewed   CXR: Reviewed  Echo:Reviewed  ======================================================  PAST MEDICAL & SURGICAL HISTORY:  CHF (congestive heart failure)    CAD (coronary artery disease)    Depression    Pleural effusion    History of 2019 novel coronavirus disease (COVID-19)  2020    Hemorrhoids    Bleeding hemorrhoids    Peripheral arterial disease    Claudication    BPH with urinary obstruction    ACC/AHA stage D systolic heart failure    Anticoagulation goal of INR 2.0 to 2.5    Falls    Clavicle fracture    CKD (chronic kidney disease), stage III    Iron deficiency anemia    H/O epistaxis    Vertigo    GI bleed    S/P TVR (tricuspid valve repair)    S/P ventricular assist device    S/P endoscopy      ====================ASSESSMENT ==============  Stage D Nonischemic Cardiomyopathy, Status Post HM2 on 2017    Cardiogenic shock  Hemodynamic instability   Acute hypoxemic respiratory failure  GI bleed , Status Post Enteroscopy   Anemia, in setting of melena   Chronic Kidney Disease  Stress hyperglycemia   C.diff positive on    Hypovolemic shock  Septic shock    Plan:  ====================== NEUROLOGY=====================  CTH on  with no acute intracranial hemorrhage or acute territorial infarction  Tylenol PRN for analgesia   Sleep regimen with Clonazepam   C/w Mirtazapine for depression  Psych recommended to continue current meds  PT as tolerated and ambulate    acetaminophen    Suspension .. 650 milliGRAM(s) Oral every 6 hours PRN Mild Pain (1 - 3)  clonazePAM  Tablet 0.5 milliGRAM(s) Oral at bedtime  mirtazapine 7.5 milliGRAM(s) Oral daily    ==================== RESPIRATORY======================  Acute hypoxemic respiratory failure s/p #8 shiley trach on ; tolerating TC since , continue as tolerated  Requiring intermittent full vent support, continue close monitoring of respiratory rate and breathing pattern, following of ABG’s with A-line monitoring, continuous pulse oximetry monitoring.   CT chest : Sub q emphysema in R chest wall, GGO RUL     Mechanical Ventilation:  Mode: standby    ====================CARDIOVASCULAR==================  Stage D Nonischemic Cardiomyopathy, Status Post HM2 on 2017; LVAD settings 9200 with flow of 5.1  TTE : reveals at least moderate MR, mild AI, severe global LV syst dysfxn, dec RV fxn   Continue invasive hemodynamic monitoring   Propranolol for rate control of HR 2/2 Hyperthyroidism      propranolol 20 milliGRAM(s) Oral every 8 hours    ===================HEMATOLOGIC/ONC ===================  Anemia due to acute blood loss, monitor H&H/Plts    Continue monitoring daily LDH - No anticoagulation as he has had multiple GI bleeds    VTE prophylaxis, heparin   Injectable 5000 Unit(s) SubCutaneous every 8 hours    ===================== RENAL =========================  Continue monitoring urine output, I&OS, BUN/Cr   Replete lytes PRN. Keep K> 4 and Mg >2   PO Lasix 20mg x1 for wet CXR this AM, will monitor K closely     ==================== GASTROINTESTINAL===================  CT A/P on : small ascites; ABD US showing GB wall thickening w/ surrounding pericholecystic fluid  S/p cholecystostomy tube placed on : with IR with -60cc of black bile, will monitor site closely.   Tolerating tube feeds @ 40cc/hr, goal 60, increase as tolerated  Recent emesis on  in setting of abdominal pain, as per GI likely component of illeus given critical illness   If worsening abdominal pain or vomiting, recommended CT A/P to r/o toxic megacolon - continue Reglan TID  Bowel regimen with Miralax and Senna   Zofran PRN for nausea     GI prophylaxis, pantoprazole  Injectable 40 milliGRAM(s) IV Push every 12 hours  polyethylene glycol 3350 17 Gram(s) Oral daily  senna 2 Tablet(s) Oral at bedtime  ondansetron Injectable 4 milliGRAM(s) IV Push every 6 hours PRN Nausea and/or Vomiting  metoclopramide Injectable 10 milliGRAM(s) IV Push every 8 hours    =======================    ENDOCRINE  =====================  Stress hyperglycemia, continue glucose control with admelog sliding scale   Thyroid US to evaluate for thyroid nodule/goiter in setting of hyperthyroid  - contraindicated at this time 2/2 trach  Continue methimazole and monitor TSH closely      insulin lispro (ADMELOG) corrective regimen sliding scale   SubCutaneous every 6 hours  methimazole 10 milliGRAM(s) Oral every 8 hours    ========================INFECTIOUS DISEASE================  Afebrile, WBC within normal limits  Continue trending WBC and monitoring fever curve       Patient requires continuous monitoring with bedside rhythm monitoring, pulse ox monitoring, and intermittent blood gas analysis. Care plan discussed with ICU care team. Patient remained critical and at risk for life threatening decompensation.     By signing my name below, I, Agnieszka Cherry, attest that this documentation has been prepared under the direction and in the presence of CLAUDIA Goldstein   Electronically signed: Cesia Shaffer, 21 @ 07:26    I, Cristóbal Cisneros, personally performed the services described in this documentation. all medical record entries made by the luisibmel were at my direction and in my presence. I have reviewed the chart and agree that the record reflects my personal performance and is accurate and complete  Electronically signed: CLAUDIA Goldstein        RICKY HAMPTON  MRN-[id number]  Patient is a 66y old  Male who presents with a chief complaint of Anemia, Supratherapeutic INR, Dark Stools (10 Aug 2021 18:27)    HPI:  65M PMH ACC/AHA stage D HF due to NICM HM2 LVAD , TV annuloplasty ring 17 as destination therapy due to severe peripheral artery disease with significant stenosis  SIADH, Depression, CKD-3 with hyperkalemia, past E. coli UTIs, driveline drainage (21) and COVID-19 (back in 2020)  He was recently seen in clinic where he complained of abdominal pain and dark stools w constipation back in May. He presents to [facility] ER today weakness and fatigue, moderate and + Black stools for three days, on coumadin secondary to warfarin use in the setting of an LVAD. Patient has required transfusions for GIB in the past. Mostly recently back in 2021 pt had anemia with dark stools. No interventions was done at that time. However Last Endoscopy was done in 2020 (negative). Today labs show patient is anemic with H/H of 4.5/16.3,. INR is 8.84 MAP in the 90s, Temp 35.1. He denies any chest pain, shortness of breath, dizziness, abd pain, nausea or vomiting.       (2021 16:57)      Surgery/Hospital Course:   admit for melena w/ anemia, INR 8.84   6/15 Capsul study (+) for small bowel bleed, balloon endoscopy (old blood in prox ileum); post EGD - septic w/ L opacity, re-intubated for concern for aspiration, TTE (Mod MR, decrease biV w/ interventricular septum boweing towards R)   bronch    +C Diff    TC    CT C/A/P: Fluid filled colon which may be 2/2 rapid transit. Small bilateral pleffs with associates. Compressive atelectasis New ISABELLE & LLL  parenchymal opacities, suspicious for pneumonia. 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    #8 Shiley trach at bedside    CPAP trials    LVAD speed increased to 9200   Bronch; Central line dc'ed   TC since , continue as tolerated. Patient transferred to SDU.    Cont PT, no trach downsize today(#8cuffed)/ Anticoagulation/ Continue TF, speech f/u/ Vanco taper    oob to chair  INR  2.47  5 mg coumadin     increased lop to 25 q8  per HF   INR today 2.64.  H&H 7.3/24 this AM.  Will repeat CBC at noon, and will send stool guaiac Patient with persistent abdominal tenderness, rate of tube feeds decreased.  No nausea/vomiting.     INR today 2.4H&H 9.1/.6 low flow overnight /N&V  NPO resolved for capsule study  Coumadin on hold refusing Tube feeds on D5 / normal  @50 cc/hr   INR 2.69  H&H 7..1 refusing Tube feeds on D5 2 normal  @50 cc/hr. This am + BM Anusha Oneill HF  aware- PRBC x1  GI team consulted -  NPO  plan on study in am-  D/w Dr Cadet Patient  to return to CTU for further management; 1PRBCS    Post op INR 2.2 today.  No bleeding. BC + for SM.  Pt is hypotensive requiring pressor and inotropic support.  ID follow up today on Cefepime will follow.   R PTC for PTX    CT C/A/P: sub q emphysema in R chest wall, GGO RUL, small ascites CTH negative; Abd US: GB thickening, pericholecystic fluid     Perchole drain in place continues to drain total output overnight 133.  Fever today 38.8 given tylenol.  Will repeat BC now.     duplex LE negative    Patient with persistent abdominal pain, refusing tube feeds and medications, Psych consulted    Today:  Tolerating TC since , continue as tolerated. PO Lasix 20mg x1 for wet CXR this AM, weight gain, will monitor K closely. Patient is a candidate for transfer to stepdown. Tolerating tube feeds at goal now, will defer a PEG at this time. Ambulate as tolerated. Continue Propranolol for rate control for Hyperthyroidism, HR improved from 120s to 100s. Will titrate as tolerated.    REVIEW OF SYSTEMS:  Unable to obtain secondary to pt being trached    ICU Vital Signs Last 24 Hrs  T(C): 37 (11 Aug 2021 04:00), Max: 37.1 (10 Aug 2021 08:00)  T(F): 98.6 (11 Aug 2021 04:00), Max: 98.8 (10 Aug 2021 08:00)  HR: 100 (11 Aug 2021 07:00) (90 - 133)  BP: --  BP(mean): --  ABP: 91/68 (11 Aug 2021 07:00) (79/56 - 143/129)  ABP(mean): 78 (11 Aug 2021 07:00) (65 - 137)  RR: 28 (11 Aug 2021 07:00) (8 - 41)  SpO2: 100% (11 Aug 2021 07:) (93% - 100%)      Physical Exam:  Gen:  awake and alert, NAD  CNS:  intact, nonfocal, responds to all commands  Neck: no JVD, +TC   RES : course breath sounds, no wheezing              CVS: +LVAD hum   Abd: Soft, minimally-moderately tender in RUQ by perc dawn site, NT everywhere else, positive BS throughout. Perc dawn drain site c/d/i draining bilious fluid.  Skin: No rash  Ext:  no edema    ============================I/O===========================   I&O's Detail    10 Aug 2021 07:01  -  11 Aug 2021 07:00  --------------------------------------------------------  IN:    dextrose 5%: 90 mL    Enteral Tube Flush: 160 mL    IV PiggyBack: 100 mL    Miscellaneous Tube Feedin mL  Total IN: 1340 mL    OUT:    Drain (mL): 450 mL    Voided (mL): 650 mL  Total OUT: 1100 mL    Total NET: 240 mL        ============================ LABS =========================                        9.3    8.80  )-----------( 301      ( 11 Aug 2021 00:30 )             30.3         136  |  98  |  8   ----------------------------<  132<H>  4.4   |  26  |  0.55    Ca    9.6      11 Aug 2021 00:29  Phos  3.6       Mg     1.7         TPro  8.4<H>  /  Alb  3.7  /  TBili  0.5  /  DBili  x   /  AST  29  /  ALT  32  /  AlkPhos  145<H>      LIVER FUNCTIONS - ( 11 Aug 2021 00:29 )  Alb: 3.7 g/dL / Pro: 8.4 g/dL / ALK PHOS: 145 U/L / ALT: 32 U/L / AST: 29 U/L / GGT: x             ABG - ( 11 Aug 2021 00:07 )  pH, Arterial: 7.39  pH, Blood: x     /  pCO2: 50    /  pO2: 161   / HCO3: 29    / Base Excess: 4.2   /  SaO2: 100                 ======================Micro/Rad/Cardio=================  Culture: Reviewed   CXR: Reviewed  Echo:Reviewed  ======================================================  PAST MEDICAL & SURGICAL HISTORY:  CHF (congestive heart failure)    CAD (coronary artery disease)    Depression    Pleural effusion    History of 2019 novel coronavirus disease (COVID-19)  2020    Hemorrhoids    Bleeding hemorrhoids    Peripheral arterial disease    Claudication    BPH with urinary obstruction    ACC/AHA stage D systolic heart failure    Anticoagulation goal of INR 2.0 to 2.5    Falls    Clavicle fracture    CKD (chronic kidney disease), stage III    Iron deficiency anemia    H/O epistaxis    Vertigo    GI bleed    S/P TVR (tricuspid valve repair)    S/P ventricular assist device    S/P endoscopy      ====================ASSESSMENT ==============  Stage D Nonischemic Cardiomyopathy, Status Post HM2 on 2017    Cardiogenic shock  Hemodynamic instability   Acute hypoxemic respiratory failure  GI bleed , Status Post Enteroscopy   Anemia, in setting of melena   Chronic Kidney Disease  Stress hyperglycemia   C.diff positive on    Hypovolemic shock  Septic shock    Plan:  ====================== NEUROLOGY=====================  CTH on  with no acute intracranial hemorrhage or acute territorial infarction  Tylenol PRN for analgesia   Sleep regimen with Clonazepam   C/w Mirtazapine for depression  Psych recommended to continue current meds  PT as tolerated and ambulate    acetaminophen    Suspension .. 650 milliGRAM(s) Oral every 6 hours PRN Mild Pain (1 - 3)  clonazePAM  Tablet 0.5 milliGRAM(s) Oral at bedtime  mirtazapine 7.5 milliGRAM(s) Oral daily    ==================== RESPIRATORY======================  Acute hypoxemic respiratory failure s/p #8 shiley trach on ; tolerating TC since , continue as tolerated  Requiring intermittent full vent support, continue close monitoring of respiratory rate and breathing pattern, following of ABG’s with A-line monitoring, continuous pulse oximetry monitoring.   CT chest : Sub q emphysema in R chest wall, GGO RUL     Mechanical Ventilation:  Mode: standby    ====================CARDIOVASCULAR==================  Stage D Nonischemic Cardiomyopathy, Status Post HM2 on 2017; LVAD settings 9200 with flow of 5.1  TTE : reveals at least moderate MR, mild AI, severe global LV syst dysfxn, dec RV fxn   Continue invasive hemodynamic monitoring   Propranolol for rate control of HR 2/2 Hyperthyroidism, titrate as tolerated    propranolol 20 milliGRAM(s) Oral every 8 hours    ===================HEMATOLOGIC/ONC ===================  Anemia due to acute blood loss, monitor H&H/Plts    Continue monitoring daily LDH - No anticoagulation as he has had multiple GI bleeds    VTE prophylaxis, heparin   Injectable 5000 Unit(s) SubCutaneous every 8 hours    ===================== RENAL =========================  Continue monitoring urine output, I&OS, BUN/Cr   Replete lytes PRN. Keep K> 4 and Mg >2   PO Lasix 20mg x1 for wet CXR this AM and gain in weight, will monitor K closely     ==================== GASTROINTESTINAL===================  CT A/P on : small ascites; ABD US showing GB wall thickening w/ surrounding pericholecystic fluid  S/p cholecystostomy tube placed on : with IR with -60cc of black bile, will monitor site closely.   Tolerating tube feeds @ 60cc/hr which is goal, having BMs, will monitor  Recent emesis on  in setting of abdominal pain, as per GI likely component of illeus given critical illness   If worsening abdominal pain or vomiting, recommended CT A/P to r/o toxic megacolon - continue Reglan TID  Bowel regimen with Miralax and Senna   Zofran PRN for nausea     GI prophylaxis, pantoprazole  Injectable 40 milliGRAM(s) IV Push every 12 hours  polyethylene glycol 3350 17 Gram(s) Oral daily  senna 2 Tablet(s) Oral at bedtime  ondansetron Injectable 4 milliGRAM(s) IV Push every 6 hours PRN Nausea and/or Vomiting  metoclopramide Injectable 10 milliGRAM(s) IV Push every 8 hours    =======================    ENDOCRINE  =====================  Stress hyperglycemia, continue glucose control with admelog sliding scale   Thyroid US to evaluate for thyroid nodule/goiter in setting of hyperthyroid  - contraindicated at this time 2/2 trach  Continue methimazole and monitor TFTs closely      insulin lispro (ADMELOG) corrective regimen sliding scale   SubCutaneous every 6 hours  methimazole 10 milliGRAM(s) Oral every 8 hours    ========================INFECTIOUS DISEASE================  Afebrile, WBC within normal limits  Continue trending WBC and monitoring fever curve       Patient requires continuous monitoring with bedside rhythm monitoring, pulse ox monitoring, and intermittent blood gas analysis. Care plan discussed with ICU care team. Patient remained critical and at risk for life threatening decompensation.     By signing my name below, I, Agnieszka Cherry, attest that this documentation has been prepared under the direction and in the presence of CLAUDIA Goldstein   Electronically signed: Cesia Shaffer, 21 @ 07:26    I, Cristóbal Cisneros, personally performed the services described in this documentation. all medical record entries made by the scribe were at my direction and in my presence. I have reviewed the chart and agree that the record reflects my personal performance and is accurate and complete  Electronically signed: CLAUDIA Goldstein

## 2021-08-11 NOTE — PROGRESS NOTE ADULT - SUBJECTIVE AND OBJECTIVE BOX
Chief Complaint:  Patient is a 66y old  Male who presents with a chief complaint of Anemia, Supratherapeutic INR, Dark Stools (11 Aug 2021 10:26)      Interval Events: no acute events overnight  - now reports pain only at perc dawn site  - tolerating TF at 60cc/hr      Hospital Medications:  acetaminophen    Suspension .. 650 milliGRAM(s) Oral every 6 hours PRN  chlorhexidine 0.12% Liquid 15 milliLiter(s) Oral Mucosa every 12 hours  clonazePAM  Tablet 0.5 milliGRAM(s) Oral at bedtime  heparin   Injectable 5000 Unit(s) SubCutaneous every 8 hours  insulin lispro (ADMELOG) corrective regimen sliding scale   SubCutaneous every 6 hours  magnesium sulfate  IVPB 2 Gram(s) IV Intermittent once  methimazole 10 milliGRAM(s) Oral every 8 hours  metoclopramide Injectable 10 milliGRAM(s) IV Push every 8 hours  mirtazapine 7.5 milliGRAM(s) Oral daily  ondansetron Injectable 4 milliGRAM(s) IV Push every 6 hours PRN  pantoprazole  Injectable 40 milliGRAM(s) IV Push every 12 hours  polyethylene glycol 3350 17 Gram(s) Oral daily  propranolol 20 milliGRAM(s) Oral every 8 hours  senna 2 Tablet(s) Oral at bedtime      PMHX/PSHX:  No pertinent past medical history    CHF (congestive heart failure)    CAD (coronary artery disease)    Depression    Pleural effusion    History of 2019 novel coronavirus disease (COVID-19)    Hemorrhoids    Bleeding hemorrhoids    Peripheral arterial disease    Claudication    BPH with urinary obstruction    ACC/AHA stage D systolic heart failure    Anticoagulation goal of INR 2.0 to 2.5    Falls    Clavicle fracture    CKD (chronic kidney disease), stage III    Iron deficiency anemia    H/O epistaxis    Vertigo    GI bleed    No significant past surgical history    S/P TVR (tricuspid valve repair)    S/P ventricular assist device    S/P endoscopy            ROS:   Unable to obtain full ROS given trach      PHYSICAL EXAM:     GENERAL:  Well developed, no distress  HEENT:  NC/AT,  conjunctivae clear, sclera anicteric  CHEST:  Full & symmetric excursion, no increased effort w/ respirations  HEART:  Regular rhythm & rate  ABDOMEN:  Soft, mild tenderness at perc dawn site, non-distended  EXTREMITIES:  no LE  edema  SKIN:  No rash/erythema/ecchymoses/petechiae/wounds/jaundice  NEURO:  Awake, alert, follows commands    Vital Signs:  Vital Signs Last 24 Hrs  T(C): 37.1 (11 Aug 2021 08:00), Max: 37.1 (11 Aug 2021 00:00)  T(F): 98.7 (11 Aug 2021 08:00), Max: 98.7 (11 Aug 2021 00:00)  HR: 102 (11 Aug 2021 11:) (90 - 126)  BP: --  BP(mean): --  RR: 20 (11 Aug 2021 11:) (8 - 41)  SpO2: 100% (11 Aug 2021 11:00) (98% - 100%)  Daily     Daily Weight in k.4 (11 Aug 2021 00:00)    LABS:                        9.3    8.80  )-----------( 301      ( 11 Aug 2021 00:30 )             30.3         136  |  98  |  8   ----------------------------<  132<H>  4.4   |  26  |  0.55    Ca    9.6      11 Aug 2021 00:29  Phos  3.6       Mg     1.7         TPro  8.4<H>  /  Alb  3.7  /  TBili  0.5  /  DBili  x   /  AST  29  /  ALT  32  /  AlkPhos  145<H>      LIVER FUNCTIONS - ( 11 Aug 2021 00:29 )  Alb: 3.7 g/dL / Pro: 8.4 g/dL / ALK PHOS: 145 U/L / ALT: 32 U/L / AST: 29 U/L / GGT: x                   Imaging:             Chief Complaint:  Patient is a 66y old  Male who presents with a chief complaint of Anemia, Supratherapeutic INR, Dark Stools (11 Aug 2021 10:26)      Interval Events: no acute events overnight  - now reports pain only at perc dawn site  - tolerating TF at 60cc/hr      Hospital Medications:  acetaminophen    Suspension .. 650 milliGRAM(s) Oral every 6 hours PRN  chlorhexidine 0.12% Liquid 15 milliLiter(s) Oral Mucosa every 12 hours  clonazePAM  Tablet 0.5 milliGRAM(s) Oral at bedtime  heparin   Injectable 5000 Unit(s) SubCutaneous every 8 hours  insulin lispro (ADMELOG) corrective regimen sliding scale   SubCutaneous every 6 hours  magnesium sulfate  IVPB 2 Gram(s) IV Intermittent once  methimazole 10 milliGRAM(s) Oral every 8 hours  metoclopramide Injectable 10 milliGRAM(s) IV Push every 8 hours  mirtazapine 7.5 milliGRAM(s) Oral daily  ondansetron Injectable 4 milliGRAM(s) IV Push every 6 hours PRN  pantoprazole  Injectable 40 milliGRAM(s) IV Push every 12 hours  polyethylene glycol 3350 17 Gram(s) Oral daily  propranolol 20 milliGRAM(s) Oral every 8 hours  senna 2 Tablet(s) Oral at bedtime      PMHX/PSHX:  No pertinent past medical history    CHF (congestive heart failure)    CAD (coronary artery disease)    Depression    Pleural effusion    History of 2019 novel coronavirus disease (COVID-19)    Hemorrhoids    Bleeding hemorrhoids    Peripheral arterial disease    Claudication    BPH with urinary obstruction    ACC/AHA stage D systolic heart failure    Anticoagulation goal of INR 2.0 to 2.5    Falls    Clavicle fracture    CKD (chronic kidney disease), stage III    Iron deficiency anemia    H/O epistaxis    Vertigo    GI bleed    No significant past surgical history    S/P TVR (tricuspid valve repair)    S/P ventricular assist device    S/P endoscopy            ROS:   Unable to obtain full ROS given trach      PHYSICAL EXAM:     GENERAL:  Well developed, no distress  HEENT:  NC/AT,  conjunctivae clear, sclera anicteric  CHEST:  Full & symmetric excursion, no increased effort w/ respirations  HEART:  Regular rhythm & rate  ABDOMEN:  Soft, mild tenderness at perc dawn site, non-distended  EXTREMITIES:  no LE  edema  SKIN:  No rash/erythema/ecchymoses/petechiae/wounds/jaundice  NEURO:  Awake, alert, follows commands    Vital Signs:  Vital Signs Last 24 Hrs  T(C): 37.1 (11 Aug 2021 08:00), Max: 37.1 (11 Aug 2021 00:00)  T(F): 98.7 (11 Aug 2021 08:00), Max: 98.7 (11 Aug 2021 00:00)  HR: 102 (11 Aug 2021 11:) (90 - 126)  BP: --  BP(mean): --  RR: 20 (11 Aug 2021 11:) (8 - 41)  SpO2: 100% (11 Aug 2021 11:00) (98% - 100%)  Daily     Daily Weight in k.4 (11 Aug 2021 00:00)    LABS:                        9.3    8.80  )-----------( 301      ( 11 Aug 2021 00:30 )             30.3         136  |  98  |  8   ----------------------------<  132<H>  4.4   |  26  |  0.55    Ca    9.6      11 Aug 2021 00:29  Phos  3.6       Mg     1.7         TPro  8.4<H>  /  Alb  3.7  /  TBili  0.5  /  DBili  x   /  AST  29  /  ALT  32  /  AlkPhos  145<H>      LIVER FUNCTIONS - ( 11 Aug 2021 00:29 )  Alb: 3.7 g/dL / Pro: 8.4 g/dL / ALK PHOS: 145 U/L / ALT: 32 U/L / AST: 29 U/L / GGT: x

## 2021-08-11 NOTE — PROGRESS NOTE ADULT - PROBLEM SELECTOR PLAN 1
TSH-R ab and thyroid US pending.. Will continue Methimazole 10mg TID for now.   Suggest to continue beta blocker for tachycardia.  Will repeat FT4 tomorrow AM and FU.  Discussed plan with primary team.

## 2021-08-11 NOTE — CONSULT NOTE ADULT - ASSESSMENT
Assessment  Mr. Quispe is a 66 year old man with PMHx of ACC/AHA stage D HF due to NICM HM2 LVAD , CKD-3 with hyperkalemia, admitted in early June for GIB with complicated hospital course now s/p trach and perc dawn, now with intermittent intolerance with tube feeds.    Recommendations    INCOMPLETE***  - no acute surgical intervention indicated    - Assessment  Mr. Quispe is a 66 year old man with PMHx of ACC/AHA stage D HF due to NICM HM2 LVAD , CKD-3 with hyperkalemia, admitted in early June for GIB with complicated hospital course now s/p trach and perc dawn, now with intermittent intolerance with tube feeds.    Recommendations  - no acute surgical intervention indicated  - patient previously tolerating tube feeds at lower rate  - would recommend holding tube feeds tonight and restarting trickle feeds tomorrow slowly advancing to goal 30cc/hr  - if abdominal exam changes or pain worsens or persists, would recommend CTAP   - seen and examined with senior resident Dr. Cloud and attending Dr. Swan  - please page with any questions or concerns    Tiffani Bailon  Acute Care Surgery  [phone]

## 2021-08-11 NOTE — PROGRESS NOTE ADULT - SUBJECTIVE AND OBJECTIVE BOX
RICKY JOINT  MRN#: [id number]  Subjective:  Pulmonary progress  : recurrent Acute hypoxic respiratory Failure ,aspiration pneumonia, NICM  , chart reviewed and H/O obtained radiological and Laboratory study reviewed patient Examined     65M PMH ACC/AHA stage D HF due to NICM HM2 LVAD , TV annuloplasty ring 17 as destination therapy due to severe peripheral artery disease with significant stenosis  SIADH, Depression, CKD-3 with hyperkalemia, past E. coli UTIs, driveline drainage (21) and COVID-19 (back in 2020)  He was recently seen in clinic where he complained of abdominal pain and dark stools w constipation back in May. He presents to [facility] ER today weakness and fatigue, moderate and + Black stools for three days, on coumadin secondary to warfarin use in the setting of an LVAD. Patient has required transfusions for GIB in the past. Mostly recently back in 2021 pt had anemia with dark stools. No interventions was done at that time. However Last Endoscopy was done in 2020 (negative). Today labs show patient is anemic with H/H of 4.5/16.3,. INR is 8.84 MAP in the 90s, Temp 35.1. He denies any chest pain, shortness of breath, dizziness, abd pain, nausea or vomiting. found to have  rectal bleeding underwent endoscopy ,old blood in the proximal ileum ,  develop sepsis with LL opacity given Antibiotics , Extubated , reintubated , Bronchoscopy on Zosyn for LL pneumonia  and Amiodarone S/P TV Annuloplasty , patient remain intubated on full ventilatory support .S/P multiple units of blood transfusion , remain on full ventilatory support on Precedex and propofol , new central IJ line , diarrhea C diff. +ve on po vancomycin and IV Flagyl,  mildly distended belly , fever start on cefepime 2gm q 8 hrs S/P tracheostomy .  new RT Subclavian central line continue on contact  isolation ,still diarrhea on C-diff antibiotics ENT follow up appreciated , trial of C-PAP as tolerated , , copious secretion from trach. site chest x ray left lower lobe pneumonia , tolerating trch. collar 50% FI02 still excessive secretion need pulmonary toilet , off Ancef antibiotic , no more diarrhea back on full support mechanical ventilator , chest x ray show improvement in LLL air space disease, more awake and responsive on tube feeding no more diarrhea , S/P  Ancef for bacteremia no fever ,ID follow up noted ,  no nausea or vomiting or diarrhea still very weak and tired , event note pulled NG tube now replaced , back on tube feeding ,still on po vancomycin , getting PT and OT at bed side , no plan for decannulation for now. , no more diarrhea receiving PT and OPT at bed side , minimal secretion from tracheostomy site , no SOB getting stronger , improve muscle tone patient transfer to monitor bed still on contact isolation for C-Difficel colitis on 50% FI02, NG tube clogged , and change to resume tube feeding still loose stool . H/H drop significantly require blood transfusion , most likely GI bleeding , IV heparin D/C , vomiting 200 cc of creamy color tube feeding on hold no sob, still melena monitor in the CTU possible capsule endoscopy , H/H is stable ., patient develop TR sided  pneumothorax require chest tube placement , RT IJ central line  placed , develop fever shaking chills , blood culture positive for serratia marcescens , start on cefepime .the patient  become hypoxic and hypotensive placed on full ventilatory support and Vasopressin , levophed and Dobutamine ,S/P blood transfusion on meropenem and vancomycin , IR note appreciated   , on and  off pressors , occasional agitation on Precedex .S/P IR cholecystostomy tube drainage placement in the RT upper Quadrant , resume anticoagulation chest x ray noted C-PAP trail lasted only for 2 hrs , new RT SC line and D/C RT IJ line , RT pig tail cathter has been removed , tolerating C-PAP trial placed on trach. collar 50% FI02 GI consultant noted on NG tube feeding as tolerated , develop AF RVR S/P  bolus Amiodarone  back to regular sinus Rhythm , Flat Affect depressed , back on tube feeding Vital AF at 60 cc/ hr .still intermittent abdominal pain          (2021 16:57)    PAST MEDICAL & SURGICAL HISTORY:  CHF (congestive heart failure)    CAD (coronary artery disease)  Depression    Pleural effusion    History of 2019 novel coronavirus disease (COVID-19)  2020    Hemorrhoids    Bleeding hemorrhoids    Peripheral arterial disease    Claudication    BPH with urinary obstruction    ACC/AHA stage D systolic heart failure  Anticoagulation goal of INR 2.0 to 2.5    Falls    Clavicle fracture    CKD (chronic kidney disease), stage III    Iron deficiency anemia    H/O epistaxis    Vertigo    GI bleed    S/P TVR (tricuspid valve repair)    S/P ventricular assist device    S/P endoscopy    OBJECTIVE:  ICU Vital Signs Last 24 Hrs  T(C): 38.2 (2021 10:00), Max: 38.5 (2021 12:00)  T(F): 100.8 (2021 10:00), Max: 101.3 (2021 12:00)  HR: 65 (2021 10:00) (61 - 69)  BP: --  BP(mean): --  ABP: 105/67 (2021 10:00) (90/54 - 113/64)  ABP(mean): 77 (2021 10:00) (63 - 77)  RR: 20 (2021 10:00) (19 - 35)  SpO2: 99% (2021 10:00) (96% - 100%)       @ 07:  -   @ 07:00  --------------------------------------------------------  IN: 2693.9 mL / OUT: 1415 mL / NET: 1278.9 mL     @ 07:01  -   @ 10:49  --------------------------------------------------------  IN: 420.8 mL / OUT: 115 mL / NET: 305.8 mL  PHYSICAL EXAM:Daily   Elderly male S/P tracheostomy   on trach-collar  50% FI02      Daily Weight in k.4 (2021 00:00)  HEENT:     + NCAT  + EOMI  - Conjuctival edema   - Icterus   - Thrush   - ETT  + NGT/OGT  Neck:         + FROM  RT SCl line JVD  - Nodes - Masses + Mid-line trachea + Tracheostomy  Chest:            normal A-P diameter    Lungs:          + CTA   + Rhonchi    - Rales    - Wheezing + Decreased  LT BS   - Dullness R L  Cardiac:       + S1 + S2    + RRR   - Irregular   - S3  - S4    - Murmurs   - Rub   - Hamman’s sign   Abdomen:    + BS  + Soft + Non-tender - Distended - Organomegaly - PEG .cholecystostomy tube in place  Extremities:   - Cyanosis U/L   - Clubbing  U/L  + LE/UE Edema   + Capillary refill    + Pulses   Neuro:        - Awake   -  Alert   - Confused   - Lethargic   + Sedated  + Generalized Weakness  Skin:        - Rashes    - Erythema   + Normal incisions   + IV sites intact          HOSPITAL MEDICATIONS: All medications reviewed and analyzed  MEDICATIONS  (STANDING):  amiodarone    Tablet 200 milliGRAM(s) Oral daily  chlorhexidine 0.12% Liquid 15 milliLiter(s) Oral Mucosa every 12 hours  chlorhexidine 2% Cloths 1 Application(s) Topical <User Schedule>  dexmedetomidine Infusion 0.5 MICROgram(s)/kG/Hr (9.81 mL/Hr) IV Continuous <Continuous>  dextrose 50% Injectable 50 milliLiter(s) IV Push every 15 minutes  heparin  Infusion 400 Unit(s)/Hr (12.5 mL/Hr) IV Continuous <Continuous>  Hydromorphone  Injectable 0.5 milliGRAM(s) IV Push once  insulin lispro (ADMELOG) corrective regimen sliding scale   SubCutaneous every 6 hours  pantoprazole  Injectable 40 milliGRAM(s) IV Push every 12 hours  piperacillin/tazobactam IVPB.. 3.375 Gram(s) IV Intermittent every 8 hours  propofol Infusion 20 MICROgram(s)/kG/Min (9.42 mL/Hr) IV Continuous <Continuous>  sodium chloride 0.9% lock flush 3 milliLiter(s) IV Push every 8 hours  sodium chloride 0.9%. 1000 milliLiter(s) (10 mL/Hr) IV Continuous <Continuous>    MEDICATIONS  (PRN):  acetaminophen    Suspension .. 650 milliGRAM(s) Oral every 6 hours PRN Temp greater or equal to 38C (100.4F)    LABS: All Lab data reviewed and analyzed                          9.3    8.80  )-----------( 301      ( 11 Aug 2021 00:30 )  08    136  |  98  |  8   ----------------------------<  132<H>  4.4   |  26  |  0.55    Ca    9.6      11 Aug 2021 00:29  Phos  3.6     08-11  Mg     1.7     -    TPro  8.4<H>  /  Alb  3.7  /  TBili  0.5  /  DBili  x   /  AST  29  /  ALT  32  /  AlkPhos  145<H>  08    Ca    9.7      10 Aug 2021 00:51  Phos  3.5     08-10  Mg     1.7     -10    TPro  7.8  /  Alb  3.5  /  TBili  0.6  /  DBili  x   /  AST  33  /  ALT  30  /  AlkPhos  140<H>  08-10      Ca    9.1      09 Aug 2021 00:33  Phos  3.3     08-09  Mg     1.5     -    TPro  7.7  /  Alb  3.6  /  TBili  0.6  /  DBili  x   /  AST  25  /  ALT  24  /  AlkPhos  137<H>                                                                                     PTT - ( 2021 04:52 )  PTT:45.2 sec LIVER FUNCTIONS - ( 2021 00:42 )  Alb: 3.4 g/dL / Pro: 6.7 g/dL / ALK PHOS: 213 U/L / ALT: 15 U/L / AST: 24 U/L / GGT: x           RADIOLOGY: - Reviewed and analyzed RT Pig tail cathter  , LVAD HM2, CT scan of abdomen reviewed result noted

## 2021-08-12 NOTE — PROGRESS NOTE ADULT - SUBJECTIVE AND OBJECTIVE BOX
INFECTIOUS DISEASES FOLLOW UP-- Anitra Prater  [phone]    This is a follow up note for this  66yMale with  Anemia    Acute cholecystitis    no new complaints        ROS:  CONSTITUTIONAL:  No fever, good appetite  CARDIOVASCULAR:  No chest pain or palpitations  RESPIRATORY:  No dyspnea  GASTROINTESTINAL:  No nausea, vomiting, diarrhea, or abdominal pain except during NG feeds  GENITOURINARY:  No dysuria  NEUROLOGIC:  No headache,     Allergies    No Known Allergies    Intolerances        ANTIBIOTICS/RELEVANT:  antimicrobials    immunologic:    OTHER:  acetaminophen    Suspension .. 650 milliGRAM(s) Oral every 6 hours PRN  chlorhexidine 0.12% Liquid 15 milliLiter(s) Oral Mucosa every 12 hours  clonazePAM  Tablet 0.25 milliGRAM(s) Oral at bedtime  dronabinol 2.5 milliGRAM(s) Oral <User Schedule>  heparin   Injectable 5000 Unit(s) SubCutaneous every 8 hours  insulin lispro (ADMELOG) corrective regimen sliding scale   SubCutaneous every 6 hours  methimazole 10 milliGRAM(s) Oral every 8 hours  metoclopramide Injectable 10 milliGRAM(s) IV Push every 8 hours  mirtazapine 7.5 milliGRAM(s) Oral daily  ondansetron Injectable 4 milliGRAM(s) IV Push every 6 hours PRN  pantoprazole  Injectable 40 milliGRAM(s) IV Push every 12 hours  polyethylene glycol 3350 17 Gram(s) Oral daily  predniSONE   Tablet 40 milliGRAM(s) Oral daily  propranolol 20 milliGRAM(s) Oral every 8 hours  senna 2 Tablet(s) Oral at bedtime      Objective:  Vital Signs Last 24 Hrs  T(C): 36.1 (12 Aug 2021 20:00), Max: 37.1 (12 Aug 2021 00:00)  T(F): 96.9 (12 Aug 2021 20:00), Max: 98.7 (12 Aug 2021 00:00)  HR: 110 (12 Aug 2021 21:00) (95 - 133)  BP: --  BP(mean): --  RR: 21 (12 Aug 2021 21:00) (8 - 40)  SpO2: 100% (12 Aug 2021 21:00) (98% - 100%)    PHYSICAL EXAM:  Constitutional:no acute distress  Eyes:ALONSO, EOMI  Ear/Nose/Throat: no oral lesions, 	  Respiratory: clear BL  Cardiovascular: S1S2  Gastrointestinal:soft, (+) BS, no tenderness in RUQ  percutaneous cholecystotomy tube  Extremities:no e/e/c  No Lymphadenopathy  IV sites not inflammed.    LABS:                        9.9    8.58  )-----------( 332      ( 12 Aug 2021 15:55 )             31.7     08-12    134<L>  |  95<L>  |  13  ----------------------------<  94  4.6   |  25  |  0.56    Ca    10.2      12 Aug 2021 15:55  Phos  4.4     08-12  Mg     1.8     08-12    TPro  8.7<H>  /  Alb  3.7  /  TBili  0.7  /  DBili  x   /  AST  71<H>  /  ALT  63<H>  /  AlkPhos  148<H>  08-12          MICROBIOLOGY:            RECENT CULTURES:      RADIOLOGY & ADDITIONAL STUDIES:    < from: CT Abdomen and Pelvis No Cont (08.12.21 @ 12:03) >  IMPRESSION:  Gallbladder decompressed around percutaneous cholecystostomy tube.    No acute intra-abdominal pathology is noted.    < end of copied text >

## 2021-08-12 NOTE — PROGRESS NOTE ADULT - SUBJECTIVE AND OBJECTIVE BOX
General Surgery Progress Note     S: Pt seen and examined at bedside during AM rounds  - tube feeds has been held overnight  - Pt states that pain has improved  - denies f/c, n/v, CP, abd pain, SOB, lightheadedness.     O:    ***PHYSICAL EXAM***    Gen: NAD, laying in bed  HEENT: NC/AT  RESP: nonlabored breathing  Abdominal: Soft, minimally distended, non-tender, no rebound,     MEDICATIONS  (STANDING):  chlorhexidine 0.12% Liquid 15 milliLiter(s) Oral Mucosa every 12 hours  clonazePAM  Tablet 0.25 milliGRAM(s) Oral at bedtime  dextrose 5% + sodium chloride 0.45%. 1000 milliLiter(s) (50 mL/Hr) IV Continuous <Continuous>  dronabinol 2.5 milliGRAM(s) Oral <User Schedule>  heparin   Injectable 5000 Unit(s) SubCutaneous every 8 hours  insulin lispro (ADMELOG) corrective regimen sliding scale   SubCutaneous every 6 hours  methimazole 10 milliGRAM(s) Oral every 8 hours  metoclopramide Injectable 10 milliGRAM(s) IV Push every 8 hours  mirtazapine 7.5 milliGRAM(s) Oral daily  pantoprazole  Injectable 40 milliGRAM(s) IV Push every 12 hours  polyethylene glycol 3350 17 Gram(s) Oral daily  propranolol 20 milliGRAM(s) Oral every 8 hours  senna 2 Tablet(s) Oral at bedtime    MEDICATIONS  (PRN):  acetaminophen    Suspension .. 650 milliGRAM(s) Oral every 6 hours PRN Mild Pain (1 - 3)  ondansetron Injectable 4 milliGRAM(s) IV Push every 6 hours PRN Nausea and/or Vomiting          Vital Signs Last 24 Hrs  T(C): 36.8 (12 Aug 2021 08:00), Max: 37.4 (11 Aug 2021 20:00)  T(F): 98.2 (12 Aug 2021 08:00), Max: 99.3 (11 Aug 2021 20:00)  HR: 115 (12 Aug 2021 10:20) (95 - 120)  BP: --  BP(mean): --  RR: 34 (12 Aug 2021 10:20) (8 - 36)  SpO2: 100% (12 Aug 2021 10:20) (98% - 100%)    08-11-21 @ 07:01  -  08-12-21 @ 07:00  --------------------------------------------------------  IN: 1200 mL / OUT: 1190 mL / NET: 10 mL    08-12-21 @ 07:01  -  08-12-21 @ 11:37  --------------------------------------------------------  IN: 200 mL / OUT: 270 mL / NET: -70 mL            LABS:                        9.6    7.31  )-----------( 315      ( 12 Aug 2021 00:23 )             30.9     08-12    133<L>  |  94<L>  |  11  ----------------------------<  100<H>  4.1   |  25  |  0.57    Ca    10.0      12 Aug 2021 00:22  Phos  4.4     08-12  Mg     1.8     08-12    TPro  8.4<H>  /  Alb  3.8  /  TBili  0.5  /  DBili  x   /  AST  43<H>  /  ALT  43  /  AlkPhos  137<H>  08-12

## 2021-08-12 NOTE — PROGRESS NOTE ADULT - ASSESSMENT
Assessment  Hyperthyroidism: 66y Male with no history thyroid disease, was not on any thyroid supplements, previously euthyroid (June 2021), was on amiodarone (last dose 8/7), now in hyperthyroid state, Hashimotos, started on Methimazole 10mg TID and  propanolol for tachycardia, LFTs WNL,  thyroid US pending. Repeat FT4 remains elevated without improvement..  Hyperglycemia: Patient in nondiabetic range, A1C 5.6%, now on insulin coverage, blood sugars in acceptable range, no hypoglycemias, on TFs.  CHF: on medications, stable, monitored.  Anemia: Monitored      Pavan Barone MD  Cell: [phone]  Office: [phone]       Assessment  Hyperthyroidism: 66y Male with no history thyroid disease, was not on any thyroid supplements, previously euthyroid (June 2021), was on amiodarone (last dose 8/7), now in hyperthyroid state, Hashimotos, started on Methimazole 10mg TID and propanolol for tachycardia, LFTs WNL,  thyroid US pending. Repeat FT4 remains elevated without improvement..  Hyperglycemia: Patient in nondiabetic range, A1C 5.6%, now on insulin coverage, blood sugars in acceptable range, no hypoglycemias, on TFs.  CHF: on medications, stable, monitored.  Anemia: Monitored      Pavan Barone MD  Cell: [phone]  Office: [phone]

## 2021-08-12 NOTE — PROGRESS NOTE ADULT - SUBJECTIVE AND OBJECTIVE BOX
RICKY JOINT  MRN#: [id number]  Subjective:  Pulmonary progress  : recurrent Acute hypoxic respiratory Failure ,aspiration pneumonia, NICM  , chart reviewed and H/O obtained radiological and Laboratory study reviewed patient Examined     65M PMH ACC/AHA stage D HF due to NICM HM2 LVAD , TV annuloplasty ring 17 as destination therapy due to severe peripheral artery disease with significant stenosis  SIADH, Depression, CKD-3 with hyperkalemia, past E. coli UTIs, driveline drainage (21) and COVID-19 (back in 2020)  He was recently seen in clinic where he complained of abdominal pain and dark stools w constipation back in May. He presents to [facility] ER today weakness and fatigue, moderate and + Black stools for three days, on coumadin secondary to warfarin use in the setting of an LVAD. Patient has required transfusions for GIB in the past. Mostly recently back in 2021 pt had anemia with dark stools. No interventions was done at that time. However Last Endoscopy was done in 2020 (negative). Today labs show patient is anemic with H/H of 4.5/16.3,. INR is 8.84 MAP in the 90s, Temp 35.1. He denies any chest pain, shortness of breath, dizziness, abd pain, nausea or vomiting. found to have  rectal bleeding underwent endoscopy ,old blood in the proximal ileum ,  develop sepsis with LL opacity given Antibiotics , Extubated , reintubated , Bronchoscopy on Zosyn for LL pneumonia  and Amiodarone S/P TV Annuloplasty , patient remain intubated on full ventilatory support .S/P multiple units of blood transfusion , remain on full ventilatory support on Precedex and propofol , new central IJ line , diarrhea C diff. +ve on po vancomycin and IV Flagyl,  mildly distended belly , fever start on cefepime 2gm q 8 hrs S/P tracheostomy .  new RT Subclavian central line continue on contact  isolation ,still diarrhea on C-diff antibiotics ENT follow up appreciated , trial of C-PAP as tolerated , , copious secretion from trach. site chest x ray left lower lobe pneumonia , tolerating trch. collar 50% FI02 still excessive secretion need pulmonary toilet , off Ancef antibiotic , no more diarrhea back on full support mechanical ventilator , chest x ray show improvement in LLL air space disease, more awake and responsive on tube feeding no more diarrhea , S/P  Ancef for bacteremia no fever ,ID follow up noted ,  no nausea or vomiting or diarrhea still very weak and tired , event note pulled NG tube now replaced , back on tube feeding ,still on po vancomycin , getting PT and OT at bed side , no plan for decannulation for now. , no more diarrhea receiving PT and OPT at bed side , minimal secretion from tracheostomy site , no SOB getting stronger , improve muscle tone patient transfer to monitor bed still on contact isolation for C-Difficel colitis on 50% FI02, NG tube clogged , and change to resume tube feeding still loose stool . H/H drop significantly require blood transfusion , most likely GI bleeding , IV heparin D/C , vomiting 200 cc of creamy color tube feeding on hold no sob, still melena monitor in the CTU possible capsule endoscopy , H/H is stable ., patient develop TR sided  pneumothorax require chest tube placement , RT IJ central line  placed , develop fever shaking chills , blood culture positive for serratia marcescens , start on cefepime .the patient  become hypoxic and hypotensive placed on full ventilatory support and Vasopressin , levophed and Dobutamine ,S/P blood transfusion on meropenem and vancomycin , IR note appreciated   , on and  off pressors , occasional agitation on Precedex .S/P IR cholecystostomy tube drainage placement in the RT upper Quadrant , resume anticoagulation chest x ray noted C-PAP trail lasted only for 2 hrs , new RT SC line and D/C RT IJ line , RT pig tail cathter has been removed , tolerating C-PAP trial placed on trach. collar 50% FI02 GI consultant noted on NG tube feeding as tolerated , develop AF RVR S/P  bolus Amiodarone  back to regular sinus Rhythm , Flat Affect depressed , back on tube feeding Vital AF at 60 cc/ hr .still intermittent abdominal pain , no fever saturation is accepted          (2021 16:57)    PAST MEDICAL & SURGICAL HISTORY:  CHF (congestive heart failure)    CAD (coronary artery disease)  Depression    Pleural effusion    History of 2019 novel coronavirus disease (COVID-19)  2020    Hemorrhoids    Bleeding hemorrhoids    Peripheral arterial disease    Claudication    BPH with urinary obstruction    ACC/AHA stage D systolic heart failure  Anticoagulation goal of INR 2.0 to 2.5    Falls    Clavicle fracture    CKD (chronic kidney disease), stage III    Iron deficiency anemia    H/O epistaxis    Vertigo    GI bleed    S/P TVR (tricuspid valve repair)    S/P ventricular assist device    S/P endoscopy    OBJECTIVE:  ICU Vital Signs Last 24 Hrs  T(C): 38.2 (2021 10:00), Max: 38.5 (2021 12:00)  T(F): 100.8 (2021 10:00), Max: 101.3 (2021 12:00)  HR: 65 (2021 10:00) (61 - 69)  BP: --  BP(mean): --  ABP: 105/67 (2021 10:00) (90/54 - 113/64)  ABP(mean): 77 (2021 10:00) (63 - 77)  RR: 20 (2021 10:00) (19 - 35)  SpO2: 99% (2021 10:00) (96% - 100%)       @ 07: @ 07:00  --------------------------------------------------------  IN: 2693.9 mL / OUT: 1415 mL / NET: 1278.9 mL     @ 07:01  -   @ 10:49  --------------------------------------------------------  IN: 420.8 mL / OUT: 115 mL / NET: 305.8 mL  PHYSICAL EXAM:Daily   Elderly male S/P tracheostomy   on trach-collar  50% FI02      Daily Weight in k.4 (2021 00:00)  HEENT:     + NCAT  + EOMI  - Conjuctival edema   - Icterus   - Thrush   - ETT  + NGT/OGT  Neck:         + FROM  RT SCl line JVD  - Nodes - Masses + Mid-line trachea + Tracheostomy  Chest:            normal A-P diameter    Lungs:          + CTA   + Rhonchi    - Rales    - Wheezing + Decreased  LT BS   - Dullness R L  Cardiac:       + S1 + S2    + RRR   - Irregular   - S3  - S4    - Murmurs   - Rub   - Hamman’s sign   Abdomen:    + BS  + Soft + Non-tender - Distended - Organomegaly - PEG .cholecystostomy tube in place  Extremities:   - Cyanosis U/L   - Clubbing  U/L  + LE/UE Edema   + Capillary refill    + Pulses   Neuro:        - Awake   -  Alert   - Confused   - Lethargic   + Sedated  + Generalized Weakness  Skin:        - Rashes    - Erythema   + Normal incisions   + IV sites intact          HOSPITAL MEDICATIONS: All medications reviewed and analyzed  MEDICATIONS  (STANDING):  amiodarone    Tablet 200 milliGRAM(s) Oral daily  chlorhexidine 0.12% Liquid 15 milliLiter(s) Oral Mucosa every 12 hours  chlorhexidine 2% Cloths 1 Application(s) Topical <User Schedule>  dexmedetomidine Infusion 0.5 MICROgram(s)/kG/Hr (9.81 mL/Hr) IV Continuous <Continuous>  dextrose 50% Injectable 50 milliLiter(s) IV Push every 15 minutes  heparin  Infusion 400 Unit(s)/Hr (12.5 mL/Hr) IV Continuous <Continuous>  Hydromorphone  Injectable 0.5 milliGRAM(s) IV Push once  insulin lispro (ADMELOG) corrective regimen sliding scale   SubCutaneous every 6 hours  pantoprazole  Injectable 40 milliGRAM(s) IV Push every 12 hours  piperacillin/tazobactam IVPB.. 3.375 Gram(s) IV Intermittent every 8 hours  propofol Infusion 20 MICROgram(s)/kG/Min (9.42 mL/Hr) IV Continuous <Continuous>  sodium chloride 0.9% lock flush 3 milliLiter(s) IV Push every 8 hours  sodium chloride 0.9%. 1000 milliLiter(s) (10 mL/Hr) IV Continuous <Continuous>    MEDICATIONS  (PRN):  acetaminophen    Suspension .. 650 milliGRAM(s) Oral every 6 hours PRN Temp greater or equal to 38C (100.4F)    LABS: All Lab data reviewed and analyzed                                     9.9    8.58  )-----------( 332      ( 12 Aug 2021 15:55 )             31.7    08-12    133<L>  |  94<L>  |  11  ----------------------------<  100<H>  4.1   |  25  |  0.57    Ca    10.0      12 Aug 2021 00:22  Phos  4.4     08-12  Mg     1.8     08-    TPro  8.4<H>  /  Alb  3.8  /  TBili  0.5  /  DBili  x   /  AST  43<H>  /  ALT  43  /  AlkPhos  137<H>  08    Ca    9.6      11 Aug 2021 00:29  Phos  3.6     08-11  Mg     1.7     -    TPro  8.4<H>  /  Alb  3.7  /  TBili  0.5  /  DBili  x   /  AST  29  /  ALT  32  /  AlkPhos  145<H>  08-    Ca    9.7      10 Aug 2021 00:51  Phos  3.5     08-10  Mg     1.7     -10    TPro  7.8  /  Alb  3.5  /  TBili  0.6  /  DBili  x   /  AST  33  /  ALT  30  /  AlkPhos  140<H>  08-10      Ca    9.1      09 Aug 2021 00:33  Phos  3.3     08-09  Mg     1.5     -    TPro  7.7  /  Alb  3.6  /  TBili  0.6  /  DBili  x   /  AST  25  /  ALT  24  /  AlkPhos  137<H>                                                                                     PTT - ( 2021 04:52 )  PTT:45.2 sec LIVER FUNCTIONS - ( 2021 00:42 )  Alb: 3.4 g/dL / Pro: 6.7 g/dL / ALK PHOS: 213 U/L / ALT: 15 U/L / AST: 24 U/L / GGT: x           RADIOLOGY: - Reviewed and analyzed RT Pig tail cathter  , LVAD HM2, CT scan of abdomen reviewed result noted

## 2021-08-12 NOTE — PROGRESS NOTE ADULT - SUBJECTIVE AND OBJECTIVE BOX
RICKY GARCIA  MRN-[id number]  Patient is a 66y old  Male who presents with a chief complaint of Anemia, Supratherapeutic INR, Dark Stools (11 Aug 2021 16:22)    HPI:  65M PMH ACC/AHA stage D HF due to NICM HM2 LVAD , TV annuloplasty ring 17 as destination therapy due to severe peripheral artery disease with significant stenosis  SIADH, Depression, CKD-3 with hyperkalemia, past E. coli UTIs, driveline drainage (21) and COVID-19 (back in 2020)  He was recently seen in clinic where he complained of abdominal pain and dark stools w constipation back in May. He presents to [facility] ER today weakness and fatigue, moderate and + Black stools for three days, on coumadin secondary to warfarin use in the setting of an LVAD. Patient has required transfusions for GIB in the past. Mostly recently back in 2021 pt had anemia with dark stools. No interventions was done at that time. However Last Endoscopy was done in 2020 (negative). Today labs show patient is anemic with H/H of 4.5/16.3,. INR is 8.84 MAP in the 90s, Temp 35.1. He denies any chest pain, shortness of breath, dizziness, abd pain, nausea or vomiting.       (2021 16:57)      Surgery/Hospital Course:   admit for melena w/ anemia, INR 8.84   6/15 Capsul study (+) for small bowel bleed, balloon endoscopy (old blood in prox ileum); post EGD - septic w/ L opacity, re-intubated for concern for aspiration, TTE (Mod MR, decrease biV w/ interventricular septum boweing towards R)   bronch    +C Diff    TC    CT C/A/P: Fluid filled colon which may be 2/2 rapid transit. Small bilateral pleffs with associates. Compressive atelectasis New ISABELLE & LLL  parenchymal opacities, suspicious for pneumonia. 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    #8 Shiley trach at bedside    CPAP trials    LVAD speed increased to 9200   Bronch; Central line dc'ed   TC since 7/7, continue as tolerated. Patient transferred to SDU.    Cont PT, no trach downsize today(#8cuffed)/ Anticoagulation/ Continue TF, speech f/u/ Vanco taper    oob to chair  INR  2.47  5 mg coumadin     increased lop to 25 q8  per HF   INR today 2.64.  H&H 7.3/24 this AM.  Will repeat CBC at noon, and will send stool guaiac Patient with persistent abdominal tenderness, rate of tube feeds decreased.  No nausea/vomiting.     INR today 2.4H&H 9.1.6 low flow overnight /N&V  NPO resolved for capsule study  Coumadin on hold refusing Tube feeds on D5  normal  @50 cc/hr   INR 2.69  H&H 7..1 refusing Tube feeds on D5  normal  @50 cc/hr. This am + BM Anusha Oneill HF  aware- PRBC x1  GI team consulted -  NPO  plan on study in am-  D/w Dr Cadet Patient  to return to CTU for further management; 1PRBCS    Post op INR 2.2 today.  No bleeding. BC + for SM.  Pt is hypotensive requiring pressor and inotropic support.  ID follow up today on Cefepime will follow.   R PTC for PTX    CT C/A/P: sub q emphysema in R chest wall, GGO RUL, small ascites CTH negative; Abd US: GB thickening, pericholecystic fluid     Perchole drain in place continues to drain total output overnight 133.  Fever today 38.8 given tylenol.  Will repeat BC now.     duplex LE negative    Patient with persistent abdominal pain, refusing tube feeds and medications, Psych consulted      Today:  will give intermittent bolus feeds, and obtain CT abd/pelvis non-con for further evaluation of abdomen.    REVIEW OF SYSTEMS:  Unable to obtain secondary to pt being trached.     ICU Vital Signs Last 24 Hrs  T(C): 36.8 (12 Aug 2021 08:00), Max: 37.4 (11 Aug 2021 20:00)  T(F): 98.2 (12 Aug 2021 08:00), Max: 99.3 (11 Aug 2021 20:00)  HR: 110 (12 Aug 2021 09:28) (95 - 120)  BP: --  BP(mean): --  ABP: 106/77 (12 Aug 2021 09:00) (86/68 - 110/77)  ABP(mean): 91 (12 Aug 2021 09:00) (74 - 91)  RR: 36 (12 Aug 2021 09:00) (11 - 36)  SpO2: 100% (12 Aug 2021 09:28) (98% - 100%)      Physical Exam:  Gen:  awake and alert, NAD  CNS:  intact, nonfocal, responds to all commands  Neck: no JVD, +TC   RES : course breath sounds, no wheezing              CVS: +LVAD hum   Abd: Soft, minimally-moderately tender in RUQ by perc dawn site, NT everywhere else, positive BS throughout. Perc dawn drain site c/d/i draining bilious fluid.  Skin: No rash  Ext:  no edema    ============================I/O===========================   I&O's Detail    11 Aug 2021 07:01  -  12 Aug 2021 07:00  --------------------------------------------------------  IN:    dextrose 5% + sodium chloride 0.45%: 650 mL    Enteral Tube Flush: 70 mL    Miscellaneous Tube Feedin mL  Total IN: 1200 mL    OUT:    Drain (mL): 440 mL    Voided (mL): 750 mL  Total OUT: 1190 mL    Total NET: 10 mL      12 Aug 2021 07:01  -  12 Aug 2021 09:37  --------------------------------------------------------  IN:    dextrose 5% + sodium chloride 0.45%: 150 mL  Total IN: 150 mL    OUT:  Total OUT: 0 mL    Total NET: 150 mL        ============================ LABS =========================                        9.6    7.31  )-----------( 315      ( 12 Aug 2021 00:23 )             30.9     08-12    133<L>  |  94<L>  |  11  ----------------------------<  100<H>  4.1   |  25  |  0.57    Ca    10.0      12 Aug 2021 00:22  Phos  4.4     08-12  Mg     1.8     08-    TPro  8.4<H>  /  Alb  3.8  /  TBili  0.5  /  DBili  x   /  AST  43<H>  /  ALT  43  /  AlkPhos  137<H>  12    LIVER FUNCTIONS - ( 12 Aug 2021 00:22 )  Alb: 3.8 g/dL / Pro: 8.4 g/dL / ALK PHOS: 137 U/L / ALT: 43 U/L / AST: 43 U/L / GGT: x             ABG - ( 12 Aug 2021 00:07 )  pH, Arterial: 7.40  pH, Blood: x     /  pCO2: 50    /  pO2: 165   / HCO3: 30    / Base Excess: 4.8   /  SaO2: 100                 ======================Micro/Rad/Cardio=================  Culture: Reviewed   CXR: Reviewed  Echo:Reviewed  ======================================================  PAST MEDICAL & SURGICAL HISTORY:  CHF (congestive heart failure)    CAD (coronary artery disease)    Depression    Pleural effusion    History of 2019 novel coronavirus disease (COVID-19)  2020    Hemorrhoids    Bleeding hemorrhoids    Peripheral arterial disease    Claudication    BPH with urinary obstruction    ACC/AHA stage D systolic heart failure    Anticoagulation goal of INR 2.0 to 2.5    Falls    Clavicle fracture    CKD (chronic kidney disease), stage III    Iron deficiency anemia    H/O epistaxis    Vertigo    GI bleed    S/P TVR (tricuspid valve repair)    S/P ventricular assist device    S/P endoscopy      ====================ASSESSMENT ==============  Stage D Nonischemic Cardiomyopathy, Status Post HM2 on 2017    Cardiogenic shock  Hemodynamic instability   Acute hypoxemic respiratory failure  GI bleed , Status Post Enteroscopy   Anemia, in setting of melena   Chronic Kidney Disease  Stress hyperglycemia   C.diff positive on    Hypovolemic shock  Septic shock    Plan:  ====================== NEUROLOGY=====================  Nonfocal, continue to monitor neuro status per ICU protocol.   Tylenol PRN for analgesia   Clonazepam for anxiety/sleep regimen   C/w mirtazapine for depression.     acetaminophen    Suspension .. 650 milliGRAM(s) Oral every 6 hours PRN Mild Pain (1 - 3)  clonazePAM  Tablet 0.5 milliGRAM(s) Oral at bedtime  mirtazapine 7.5 milliGRAM(s) Oral daily  ==================== RESPIRATORY======================  Acute hypoxemic respiratory failure s/p #8 shiley trach on ; tolerating TC since , continue as tolerated  Requiring intermittent full vent support, continue close monitoring of respiratory rate and breathing pattern, following of ABG’s with A-line monitoring, continuous pulse oximetry monitoring.   CT chest : Sub q emphysema in R chest wall, GGO RUL   ====================CARDIOVASCULAR==================  Stage D Nonischemic Cardiomyopathy, Status Post HM2 on 2017; LVAD settings 9200 with flow of 5.1  TTE : reveals at least moderate MR, mild AI, severe global LV syst dysfxn, dec RV fxn   Continue invasive hemodynamic monitoring   Propranolol for rate control of HR 2/2 Hyperthyroidism, titrate as tolerated    propranolol 20 milliGRAM(s) Oral every 8 hours  ===================HEMATOLOGIC/ONC ===================  Anemia due to acute blood loss, monitor H&H/Plts    Continue monitoring daily LDH   Continue Heparin for venous thromboembolism prophylaxis.     heparin   Injectable 5000 Unit(s) SubCutaneous every 8 hours  ===================== RENAL =========================  Continue monitoring urine output, I&OS, BUN/Cr   Replete lytes PRN. Keep K> 4 and Mg >2   ==================== GASTROINTESTINAL===================  CT A/P on : small ascites; ABD US showing GB wall thickening w/ surrounding pericholecystic fluid  S/p cholecystostomy tube placed on : with IR with -60cc of black bile, will monitor site closely.   Tolerating tube feeds @ 60cc/hr which is goal, having BMs, will monitor  Recent emesis on  in setting of abdominal pain, as per GI likely component of illeus given critical illness   If worsening abdominal pain or vomiting, recommended CT A/P to r/o toxic megacolon - continue Reglan TID  Bowel regimen with Miralax and Senna   Zofran PRN for nausea   Continue Protonix for stress ulcer prophylaxis.   will give intermittent bolus feeds, and obtain CT abd/pelvis non-con for further evaluation of abdomen.    dextrose 5% + sodium chloride 0.45%. 1000 milliLiter(s) (50 mL/Hr) IV Continuous <Continuous>  pantoprazole  Injectable 40 milliGRAM(s) IV Push every 12 hours  polyethylene glycol 3350 17 Gram(s) Oral daily  senna 2 Tablet(s) Oral at bedtime  ondansetron Injectable 4 milliGRAM(s) IV Push every 6 hours PRN Nausea and/or Vomiting  metoclopramide Injectable 10 milliGRAM(s) IV Push every 8 hours  =======================    ENDOCRINE  =====================  Stress hyperglycemia, continue glucose control with admelog sliding scale   Thyroid US to evaluate for thyroid nodule/goiter in setting of hyperthyroid  - contraindicated at this time 2/2 trach  Continue methimazole and monitor TFTs closely      insulin lispro (ADMELOG) corrective regimen sliding scale   SubCutaneous every 6 hours  methimazole 10 milliGRAM(s) Oral every 8 hours  ========================INFECTIOUS DISEASE================  Afebrile, WBC within normal limits  Continue trending WBC and monitoring fever curve       Patient requires continuous monitoring with bedside rhythm monitoring, pulse ox monitoring, and intermittent blood gas analysis. Care plan discussed with ICU care team. Patient remained critical and at risk for life threatening decompensation.     By signing my name below, I, Emily Roa, attest that this documentation has been prepared under the direction and in the presence of CLAUDIA Mejia   Electronically signed: Emily Roa Scribe, 21 @ 09:37    I, CLAUDIA Mejia  , personally performed the services described in this documentation. all medical record entries made by the romel were at my direction and in my presence. I have reviewed the chart and agree that the record reflects my personal performance and is accurate and complete  Electronically signed: CLAUDIA Mejia        RICKY HAMPTON  MRN-[id number]  Patient is a 66y old  Male who presents with a chief complaint of Anemia, Supratherapeutic INR, Dark Stools (11 Aug 2021 16:22)    HPI:  65M PMH ACC/AHA stage D HF due to NICM HM2 LVAD , TV annuloplasty ring 17 as destination therapy due to severe peripheral artery disease with significant stenosis  SIADH, Depression, CKD-3 with hyperkalemia, past E. coli UTIs, driveline drainage (21) and COVID-19 (back in 2020)  He was recently seen in clinic where he complained of abdominal pain and dark stools w constipation back in May. He presents to [facility] ER today weakness and fatigue, moderate and + Black stools for three days, on coumadin secondary to warfarin use in the setting of an LVAD. Patient has required transfusions for GIB in the past. Mostly recently back in 2021 pt had anemia with dark stools. No interventions was done at that time. However Last Endoscopy was done in 2020 (negative). Today labs show patient is anemic with H/H of 4.5/16.3,. INR is 8.84 MAP in the 90s, Temp 35.1. He denies any chest pain, shortness of breath, dizziness, abd pain, nausea or vomiting.     (2021 16:57)    Surgery/Hospital Course:   admit for melena w/ anemia, INR 8.84   6/15 Capsul study (+) for small bowel bleed, balloon endoscopy (old blood in prox ileum); post EGD - septic w/ L opacity, re-intubated for concern for aspiration, TTE (Mod MR, decrease biV w/ interventricular septum boweing towards R)   bronch    +C Diff    TC    CT C/A/P: Fluid filled colon which may be 2/2 rapid transit. Small bilateral pleffs with associates. Compressive atelectasis New ISABELLE & LLL  parenchymal opacities, suspicious for pneumonia. 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    #8 Shiley trach at bedside    CPAP trials    LVAD speed increased to 9200   Bronch; Central line dc'ed   TC since , continue as tolerated. Patient transferred to SDU.    Cont PT, no trach downsize today(#8cuffed)/ Anticoagulation/ Continue TF, speech f/u/ Vanco taper    oob to chair  INR  2.47  5 mg coumadin     increased lop to 25 q8  per HF   INR today 2.64.  H&H 7.3 this AM.  Will repeat CBC at noon, and will send stool guaiac Patient with persistent abdominal tenderness, rate of tube feeds decreased.  No nausea/vomiting.     INR today 2.4H&H 9.1/.6 low flow overnight /N&V  NPO resolved for capsule study  Coumadin on hold refusing Tube feeds on D5 / normal  @50 cc/hr   INR 2.69  H&H 7..1 refusing Tube feeds on D5 2 normal  @50 cc/hr. This am + BM Anusha Oneill HF  aware- PRBC x1  GI team consulted -  NPO  plan on study in am-  D/w Dr Cadet Patient  to return to CTU for further management; 1PRBCS    Post op INR 2.2 today.  No bleeding. BC + for SM.  Pt is hypotensive requiring pressor and inotropic support.  ID follow up today on Cefepime will follow.   R PTC for PTX    CT C/A/P: sub q emphysema in R chest wall, GGO RUL, small ascites CTH negative; Abd US: GB thickening, pericholecystic fluid     Perchole drain in place continues to drain total output overnight 133.  Fever today 38.8 given tylenol.  Will repeat BC now.     duplex LE negative    Patient with persistent abdominal pain, refusing tube feeds and medications, Psych consulted    Today:  will give intermittent bolus feeds, and obtain CT abd/pelvis non-con for further evaluation of abdomen.    REVIEW OF SYSTEMS:  Unable to obtain secondary to pt being trached.     ICU Vital Signs Last 24 Hrs  T(C): 36.8 (12 Aug 2021 08:00), Max: 37.4 (11 Aug 2021 20:00)  T(F): 98.2 (12 Aug 2021 08:00), Max: 99.3 (11 Aug 2021 20:00)  HR: 110 (12 Aug 2021 09:28) (95 - 120)  ABP: 106/77 (12 Aug 2021 09:00) (86/68 - 110/77)  ABP(mean): 91 (12 Aug 2021 09:00) (74 - 91)  RR: 36 (12 Aug 2021 09:00) (11 - 36)  SpO2: 100% (12 Aug 2021 09:28) (98% - 100%)    Physical Exam:  Gen:  awake and alert, NAD  CNS:  intact, nonfocal, responds to all commands  Neck: no JVD, +TC   RES : course breath sounds, no wheezing              CVS: +LVAD hum   Abd: Soft, minimally-moderately tender in RUQ by perc dawn site, NT everywhere else, positive BS throughout. Perc adwn drain site c/d/i draining bilious fluid.  Skin: No rash  Ext:  no edema    ============================I/O===========================   I&O's Detail    11 Aug 2021 07:01  -  12 Aug 2021 07:00  --------------------------------------------------------  IN:    dextrose 5% + sodium chloride 0.45%: 650 mL    Enteral Tube Flush: 70 mL    Miscellaneous Tube Feedin mL  Total IN: 1200 mL    OUT:    Drain (mL): 440 mL    Voided (mL): 750 mL  Total OUT: 1190 mL    Total NET: 10 mL    12 Aug 2021 07:01  -  12 Aug 2021 09:37  --------------------------------------------------------  IN:    dextrose 5% + sodium chloride 0.45%: 150 mL  Total IN: 150 mL    OUT:  Total OUT: 0 mL    Total NET: 150 mL    ============================ LABS =========================                        9.6    7.31  )-----------( 315      ( 12 Aug 2021 00:23 )             30.9     133<L>  |  94<L>  |  11  ----------------------------<  100<H>  4.1   |  25  |  0.57    Ca    10.0      12 Aug 2021 00:22  Phos  4.4     08  Mg     1.8         TPro  8.4<H>  /  Alb  3.8  /  TBili  0.5  /  DBili  x   /  AST  43<H>  /  ALT  43  /  AlkPhos  137<H>      LIVER FUNCTIONS - ( 12 Aug 2021 00:22 )  Alb: 3.8 g/dL / Pro: 8.4 g/dL / ALK PHOS: 137 U/L / ALT: 43 U/L / AST: 43 U/L / GGT: x           ABG - ( 12 Aug 2021 00:07 )  pH, Arterial: 7.40  pH, Blood: x     /  pCO2: 50    /  pO2: 165   / HCO3: 30    / Base Excess: 4.8   /  SaO2: 100       ======================Micro/Rad/Cardio=================  Culture: Reviewed   CXR: Reviewed  Echo:Reviewed    ======================================================  PAST MEDICAL & SURGICAL HISTORY:  CHF (congestive heart failure)    CAD (coronary artery disease)    Depression    Pleural effusion    History of 2019 novel coronavirus disease (COVID-19)  2020    Hemorrhoids    Bleeding hemorrhoids    Peripheral arterial disease    Claudication    BPH with urinary obstruction    ACC/AHA stage D systolic heart failure    Anticoagulation goal of INR 2.0 to 2.5    Falls    Clavicle fracture    CKD (chronic kidney disease), stage III    Iron deficiency anemia    H/O epistaxis    Vertigo    GI bleed    S/P TVR (tricuspid valve repair)    S/P ventricular assist device    S/P endoscopy    ====================ASSESSMENT ==============  Stage D Nonischemic Cardiomyopathy, Status Post HM2 on 2017    Cardiogenic shock  Hemodynamic instability   Acute hypoxemic respiratory failure  GI bleed , Status Post Enteroscopy   Anemia, in setting of melena   Chronic Kidney Disease  Stress hyperglycemia   C.diff positive on    Hypovolemic shock  Septic shock    Plan:  ====================== NEUROLOGY=====================  Nonfocal, continue to monitor neuro status per ICU protocol.   Tylenol PRN for analgesia   Clonazepam for anxiety/sleep regimen   C/w mirtazapine for depression.     acetaminophen    Suspension .. 650 milliGRAM(s) Oral every 6 hours PRN Mild Pain (1 - 3)  clonazePAM  Tablet 0.5 milliGRAM(s) Oral at bedtime  mirtazapine 7.5 milliGRAM(s) Oral daily    ==================== RESPIRATORY======================  Acute hypoxemic respiratory failure s/p #8 shiley trach on ; tolerating TC since , continue as tolerated  Requiring intermittent full vent support, continue close monitoring of respiratory rate and breathing pattern, following of ABG’s with A-line monitoring, continuous pulse oximetry monitoring.   CT chest : Sub q emphysema in R chest wall, GGO RUL     ====================CARDIOVASCULAR==================  Stage D Nonischemic Cardiomyopathy, Status Post HM2 on 2017; LVAD settings 9200 with flow of 5.1  TTE : reveals at least moderate MR, mild AI, severe global LV syst dysfxn, dec RV fxn   Continue invasive hemodynamic monitoring   Propranolol for rate control of HR 2/2 Hyperthyroidism, titrate as tolerated    propranolol 20 milliGRAM(s) Oral every 8 hours  ===================HEMATOLOGIC/ONC ===================  Anemia due to acute blood loss, monitor H&H/Plts    Continue monitoring daily LDH   Continue Heparin for venous thromboembolism prophylaxis.     heparin   Injectable 5000 Unit(s) SubCutaneous every 8 hours    ===================== RENAL =========================  Continue monitoring urine output, I&OS, BUN/Cr   Replete lytes PRN. Keep K> 4 and Mg >2     ==================== GASTROINTESTINAL===================  CT A/P on : small ascites; ABD US showing GB wall thickening w/ surrounding pericholecystic fluid  S/p cholecystostomy tube placed on : with IR with -60cc of black bile, will monitor site closely.   Tolerating tube feeds @ 60cc/hr which is goal, having BMs, will monitor  Recent emesis on  in setting of abdominal pain, as per GI likely component of illeus given critical illness   If worsening abdominal pain or vomiting, recommended CT A/P to r/o toxic megacolon - continue Reglan TID  Bowel regimen with Miralax and Senna   Zofran PRN for nausea   Continue Protonix for stress ulcer prophylaxis.   will give intermittent bolus feeds, and obtain CT abd/pelvis non-con for further evaluation of abdomen.    dextrose 5% + sodium chloride 0.45%. 1000 milliLiter(s) (50 mL/Hr) IV Continuous <Continuous>  pantoprazole  Injectable 40 milliGRAM(s) IV Push every 12 hours  polyethylene glycol 3350 17 Gram(s) Oral daily  senna 2 Tablet(s) Oral at bedtime  ondansetron Injectable 4 milliGRAM(s) IV Push every 6 hours PRN Nausea and/or Vomiting  metoclopramide Injectable 10 milliGRAM(s) IV Push every 8 hours    =======================    ENDOCRINE  =====================  Stress hyperglycemia, continue glucose control with admelog sliding scale   Thyroid US to evaluate for thyroid nodule/goiter in setting of hyperthyroid  - contraindicated at this time 2/2 trach  Continue methimazole and monitor TFTs closely      insulin lispro (ADMELOG) corrective regimen sliding scale   SubCutaneous every 6 hours  methimazole 10 milliGRAM(s) Oral every 8 hours  ========================INFECTIOUS DISEASE================  Afebrile, WBC within normal limits  Continue trending WBC and monitoring fever curve     I have spent 35 minutes of noncontinuous critical care time with this patient.    Patient requires continuous monitoring with bedside rhythm monitoring, pulse ox monitoring, and intermittent blood gas analysis. Care plan discussed with ICU care team. Patient remained critical and at risk for life threatening decompensation.     By signing my name below, I, Emily Roa, attest that this documentation has been prepared under the direction and in the presence of CLAUDIA Mejia   Electronically signed: Emily Roa Scribe, 21 @ 09:37    I, CLAUDIA Mejia  , personally performed the services described in this documentation. all medical record entries made by the luisibmel were at my direction and in my presence. I have reviewed the chart and agree that the record reflects my personal performance and is accurate and complete  Electronically signed: CLAUDIA Mejia

## 2021-08-12 NOTE — PROGRESS NOTE ADULT - ASSESSMENT
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 coli UTIs, driveline drainage (1/7/21) and COVID-19 (back in April 2020)  He was recently seen in clinic where he complained of abdominal pain and dark stools w constipation back in May. He presents to [facility] ER today weakness and fatigue, moderate and + Black stools for three days, on coumadin secondary to warfarin use in the setting of an LVAD. Patient has required transfusions for GIB in the past. Mostly recently back in jan 2021 pt had anemia with dark stools. No interventions was done at that time. However Last Endoscopy was done in July 2020 (negative). Today labs show patient is anemic with H/H of 4.5/16.3,. INR is 8.84 MAP in the 90s,   Returned from endoscopy appearing ill tachypneic with chills with new white out of his left lung    Leukocytosis resolved  Abdominal exam soft non tender  Unclear etiology of vomiting in the setting NG feeds      Morris Prater MD  [phone]  After 5pm/weekends [phone]

## 2021-08-12 NOTE — PROGRESS NOTE ADULT - PROBLEM SELECTOR PLAN 1
Thyroid US pending.. Will continue Methimazole 10mg TID as well as propanolol for now.   Suggest to start on prednisone for ?thyroiditis, discussed with primary team.  Will continue monitoring TFTs, labs, and FU.  Discussed plan with primary team.

## 2021-08-12 NOTE — PROGRESS NOTE ADULT - ASSESSMENT
Mr. Quispe is a 66 year old man with PMHx of ACC/AHA stage D HF due to NICM HM2 LVAD , CKD-3 with hyperkalemia, admitted in early June for GIB with complicated hospital course now s/p trach and perc dawn, now with intermittent intolerance with tube feeds.    Recommendations  - no acute surgical intervention indicated  - patient previously tolerating tube feeds at lower rate  - would recommendrestarting trickle feeds tomorrow slowly advancing to goal 30cc/hr  - if abdominal exam changes or pain worsens or persists, would recommend CTA of abd/pelv to evaluate for chronic mesenteric ischemia      LUL Cloud PGY-4  ACS

## 2021-08-12 NOTE — PROGRESS NOTE ADULT - ASSESSMENT
Assessment and Recommendation:   · Assessment	  Assessment and recommendation :  Recurrent Acute hypoxic respiratory Failure S/P tracheostomy on trach-collar  at 50% FI02  Acute blood loss anemia S/P multiple  blood transfusion   S/P septic shock off vasopressin , levophed and off  Dobutamine   S/P sepsis with serratia marcescens in the blood S/P  cefepime  S/P cholecystostomy tube placement by IR    AF RVR back to regular sinus Rhythm   Non ischemic cardiomyopathy continue ACE inhibitor and B-Blockers   S/P Septic shock and cardiogenic shock   Stage D systolic heart failure S/P LVAD HM2   MH2 LVAD  with  TV Annuloplasty  Severe peripheral vascular disease   severe hyperglycemia on insulin coverage    critical care polyneuropathy   Anemia of Acute blood Loss   severe protein caloric malnutrition   S/P Small bowel bleeding   S/P blood and FFP transfusion   Chronic kidney disease stage III   NGT feeding Vital at 60 cc/ hr  GI prophylaxis with PPI

## 2021-08-12 NOTE — PROGRESS NOTE ADULT - SUBJECTIVE AND OBJECTIVE BOX
Chief complaint  Patient is a 66y old  Male who presents with a chief complaint of Anemia, Supratherapeutic INR, Dark Stools (12 Aug 2021 11:37)   Review of systems  Patient in bed, looks comfortable, no hypoglycemic episodes.    Labs and Fingersticks  CAPILLARY BLOOD GLUCOSE      POCT Blood Glucose.: 96 mg/dL (12 Aug 2021 11:34)  POCT Blood Glucose.: 103 mg/dL (12 Aug 2021 05:47)  POCT Blood Glucose.: 96 mg/dL (11 Aug 2021 18:29)      Anion Gap, Serum: 14 (08-12 @ 00:22)  Anion Gap, Serum: 12 (08-11 @ 00:29)      Calcium, Total Serum: 10.0 (08-12 @ 00:22)  Calcium, Total Serum: 9.6 (08-11 @ 00:29)  Albumin, Serum: 3.8 (08-12 @ 00:22)  Albumin, Serum: 3.7 (08-11 @ 00:29)    Alanine Aminotransferase (ALT/SGPT): 43 (08-12 @ 00:22)  Alanine Aminotransferase (ALT/SGPT): 32 (08-11 @ 00:29)  Alkaline Phosphatase, Serum: 137 *H* (08-12 @ 00:22)  Alkaline Phosphatase, Serum: 145 *H* (08-11 @ 00:29)  Aspartate Aminotransferase (AST/SGOT): 43 *H* (08-12 @ 00:22)  Aspartate Aminotransferase (AST/SGOT): 29 (08-11 @ 00:29)        08-12    133<L>  |  94<L>  |  11  ----------------------------<  100<H>  4.1   |  25  |  0.57    Ca    10.0      12 Aug 2021 00:22  Phos  4.4     08-12  Mg     1.8     08-12    TPro  8.4<H>  /  Alb  3.8  /  TBili  0.5  /  DBili  x   /  AST  43<H>  /  ALT  43  /  AlkPhos  137<H>  08-12                        9.6    7.31  )-----------( 315      ( 12 Aug 2021 00:23 )             30.9     Medications  MEDICATIONS  (STANDING):  chlorhexidine 0.12% Liquid 15 milliLiter(s) Oral Mucosa every 12 hours  clonazePAM  Tablet 0.25 milliGRAM(s) Oral at bedtime  dextrose 5% + sodium chloride 0.45%. 1000 milliLiter(s) (50 mL/Hr) IV Continuous <Continuous>  dronabinol 2.5 milliGRAM(s) Oral <User Schedule>  heparin   Injectable 5000 Unit(s) SubCutaneous every 8 hours  insulin lispro (ADMELOG) corrective regimen sliding scale   SubCutaneous every 6 hours  methimazole 10 milliGRAM(s) Oral every 8 hours  metoclopramide Injectable 10 milliGRAM(s) IV Push every 8 hours  mirtazapine 7.5 milliGRAM(s) Oral daily  pantoprazole  Injectable 40 milliGRAM(s) IV Push every 12 hours  polyethylene glycol 3350 17 Gram(s) Oral daily  predniSONE   Tablet 40 milliGRAM(s) Oral daily  propranolol 20 milliGRAM(s) Oral every 8 hours  senna 2 Tablet(s) Oral at bedtime      Physical Exam  General: Patient comfortable in bed  Vital Signs Last 12 Hrs  T(F): 98.2 (08-12-21 @ 08:00), Max: 98.3 (08-12-21 @ 04:00)  HR: 125 (08-12-21 @ 14:00) (95 - 125)  BP: --  BP(mean): --  RR: 34 (08-12-21 @ 14:00) (8 - 36)  SpO2: 100% (08-12-21 @ 14:00) (100% - 100%)  Neck: No palpable thyroid nodules.    Diagnostics    US Thyroid: Routine   Indication: hyperthyroidism  Transport: eyal Bautista/ Monitor  Provider's Contact #: [phone] (08-08 @ 16:13)  TSH Receptor Antibody: AM Sched. Collection: 09-Aug-2021 06:00 (08-08 @ 13:31)  Thyroperoxidase Antibody: AM Sched. Collection: 09-Aug-2021 06:00 (08-08 @ 13:31)           Chief complaint  Patient is a 66y old  Male who presents with a chief complaint of Anemia, Supratherapeutic INR, Dark Stools (12 Aug 2021 11:37)   Review of systems  Patient in bed, looks comfortable, no hypoglycemic episodes.    Labs and Fingersticks  CAPILLARY BLOOD GLUCOSE      POCT Blood Glucose.: 96 mg/dL (12 Aug 2021 11:34)  POCT Blood Glucose.: 103 mg/dL (12 Aug 2021 05:47)  POCT Blood Glucose.: 96 mg/dL (11 Aug 2021 18:29)      Anion Gap, Serum: 14 (08-12 @ 00:22)  Anion Gap, Serum: 12 (08-11 @ 00:29)      Calcium, Total Serum: 10.0 (08-12 @ 00:22)  Calcium, Total Serum: 9.6 (08-11 @ 00:29)  Albumin, Serum: 3.8 (08-12 @ 00:22)  Albumin, Serum: 3.7 (08-11 @ 00:29)    Alanine Aminotransferase (ALT/SGPT): 43 (08-12 @ 00:22)  Alanine Aminotransferase (ALT/SGPT): 32 (08-11 @ 00:29)  Alkaline Phosphatase, Serum: 137 *H* (08-12 @ 00:22)  Alkaline Phosphatase, Serum: 145 *H* (08-11 @ 00:29)  Aspartate Aminotransferase (AST/SGOT): 43 *H* (08-12 @ 00:22)  Aspartate Aminotransferase (AST/SGOT): 29 (08-11 @ 00:29)        08-12    133<L>  |  94<L>  |  11  ----------------------------<  100<H>  4.1   |  25  |  0.57    Ca    10.0      12 Aug 2021 00:22  Phos  4.4     08-12  Mg     1.8     08-12    TPro  8.4<H>  /  Alb  3.8  /  TBili  0.5  /  DBili  x   /  AST  43<H>  /  ALT  43  /  AlkPhos  137<H>  08-12                        9.6    7.31  )-----------( 315      ( 12 Aug 2021 00:23 )             30.9     Medications  MEDICATIONS  (STANDING):  chlorhexidine 0.12% Liquid 15 milliLiter(s) Oral Mucosa every 12 hours  clonazePAM  Tablet 0.25 milliGRAM(s) Oral at bedtime  dextrose 5% + sodium chloride 0.45%. 1000 milliLiter(s) (50 mL/Hr) IV Continuous <Continuous>  dronabinol 2.5 milliGRAM(s) Oral <User Schedule>  heparin   Injectable 5000 Unit(s) SubCutaneous every 8 hours  insulin lispro (ADMELOG) corrective regimen sliding scale   SubCutaneous every 6 hours  methimazole 10 milliGRAM(s) Oral every 8 hours  metoclopramide Injectable 10 milliGRAM(s) IV Push every 8 hours  mirtazapine 7.5 milliGRAM(s) Oral daily  pantoprazole  Injectable 40 milliGRAM(s) IV Push every 12 hours  polyethylene glycol 3350 17 Gram(s) Oral daily  predniSONE   Tablet 40 milliGRAM(s) Oral daily  propranolol 20 milliGRAM(s) Oral every 8 hours  senna 2 Tablet(s) Oral at bedtime      Physical Exam  General: Patient comfortable in bed  Vital Signs Last 12 Hrs  T(F): 98.2 (08-12-21 @ 08:00), Max: 98.3 (08-12-21 @ 04:00)  HR: 125 (08-12-21 @ 14:00) (95 - 125)  BP: --  BP(mean): --  RR: 34 (08-12-21 @ 14:00) (8 - 36)  SpO2: 100% (08-12-21 @ 14:00) (100% - 100%)  Neck: No palpable thyroid nodules.    Diagnostics    US Thyroid: Routine   Indication: hyperthyroidism  Transport: eyal Bautista/ Monitor  Provider's Contact #: [phone] (08-08 @ 16:13)  TSH Receptor Antibody: AM Sched. Collection: 09-Aug-2021 06:00 (08-08 @ 13:31)  Thyroperoxidase Antibody: AM Sched. Collection: 09-Aug-2021 06:00 (08-08 @ 13:31)

## 2021-08-12 NOTE — CHART NOTE - NSCHARTNOTEFT_GEN_A_CORE
Spoke to bedside RNHalle. Patient is tolerated bolus tube feeds that he received at 1pm. Scheduled to receive another bolus tube feed this evening.    Surgery will sign off. Please reconsult if patient becomes intolerant of feeds or with any questions/concerns.      ACS/Trauma Surgery  [id number]

## 2021-08-13 NOTE — CHART NOTE - NSCHARTNOTEFT_GEN_A_CORE
Verbal consult received for bolus feeds of calorically dense formula.     Pt has refused tube continuous tube feeds of Vital AF, Jevity 1.2 and bolus feeds of Vital AF. Yesterday pt tolerated a single bolus of 120 ml of Vital AF, but then refused any further feedings as they "make his stomach feel worse". Surgery following, no acute intervention required, surgery signed off. GI note indicates no further GI work up needed. Was also seen by psych on 8/8 for refusal of tube feeds. Of note, pt has been on trach collar for    Estimated Nutrition Needs:   - Using dosing weight 78.5 kg  - Energy Needs (25-30 kcal/kg): 4211-5392 kcal/day  - Protein Needs (1.2-1.4 g/kg):     Previous Nutrition Diagnosis: Severe chronic malnutrition  Nutrition diagnosis is ongoing & addressed with tube feeds as tolerated    Recommendations:   1) As tolerated, try TwoCal at 250ml 4x/day. To provide 1L formula, 2000 kcal, 83.5 gm protein (25 kcal/kg, 1.06 g/kg protein using dosing weight 72.5kg)  2) As tolerated, add No Carb Prosource 1x/day to meet 94.5 gm protein (1.4 g/kg protein)  3) Defer free water to team; 1L TwoCal provides 700ml free water.     Kandice Peña RD, CDN, [facility]  #[phone] Verbal consult received for bolus feeds of calorically dense formula.     Pt has refused tube continuous tube feeds of Vital AF, Jevity 1.2 and bolus feeds of Vital AF. Yesterday pt tolerated a single bolus of 120 ml of Vital AF, but then refused any further feedings as they "make his stomach feel worse". Surgery following, no acute intervention required, surgery signed off. GI note indicates no further GI work up needed. Was also seen by psych on 8/8 for refusal of tube feeds.     Estimated Nutrition Needs:   - Using dosing weight 78.5 kg  - Energy Needs (25-30 kcal/kg): 9506-2830 kcal/day  - Protein Needs (1.2-1.4 g/kg):     Previous Nutrition Diagnosis: Severe chronic malnutrition  Nutrition diagnosis is ongoing & addressed with tube feeds as tolerated    Recommendations:   1) Via NGT, as tolerated, try TwoCal at 250ml 4x/day. To provide 1L formula, 2000 kcal, 83.5 gm protein (25 kcal/kg, 1.06 g/kg protein using dosing weight 72.5kg)  2) As tolerated, add No Carb Prosource 1x/day to meet 94.5 gm protein (1.4 g/kg protein)  3) Defer free water to team; 1L TwoCal provides 700ml free water.     Kandice Peña RD, CDN, [facility]  #[phone] Verbal consult received for bolus feeds of calorically dense formula.     Pt has refused tube continuous tube feeds of Vital AF, Jevity 1.2 and bolus feeds of Vital AF. Yesterday pt tolerated a single bolus of 120 ml of Vital AF, but then refused any further feedings as they "make his stomach feel worse". Surgery following, no acute intervention required, surgery signed off. GI note indicates no further GI work up needed. Was also seen by psych on 8/8 for refusal of tube feeds.     Estimated Nutrition Needs:   - Using dosing weight 78.5 kg  - Energy Needs (25-30 kcal/kg): 7318-5083 kcal/day  - Protein Needs (1.2-1.4 g/kg):     Previous Nutrition Diagnosis: Severe chronic malnutrition  Nutrition diagnosis is ongoing & addressed with tube feeds as tolerated    Recommendations:   1) Via NGT, as tolerated, try TwoCal at 250ml 4x/day. To provide 1L formula, 2000 kcal, 83.5 gm protein (25 kcal/kg, 1.06 g/kg protein using dosing weight 72.5kg). If pt cannot tolerate bolus, can consider TwoCal @ 40 ml/hr x24hr to provide 24kcal/kg, 1.02 g/kg protein  2) As tolerated, add No Carb Prosource 1x/day to meet 94.5 gm protein (1.4 g/kg protein) with bolus feeds  3) Defer free water to team; 1L TwoCal provides 700ml free water.     Kandice Peña RD, CDN, [facility]  #[phone]

## 2021-08-13 NOTE — PROGRESS NOTE ADULT - SUBJECTIVE AND OBJECTIVE BOX
Chief complaint  Patient is a 66y old  Male who presents with a chief complaint of Anemia, Supratherapeutic INR, Dark Stools (13 Aug 2021 09:28)   Review of systems  Patient in bed, looks comfortable, no hypoglycemic episodes.    Labs and Fingersticks  CAPILLARY BLOOD GLUCOSE      POCT Blood Glucose.: 131 mg/dL (13 Aug 2021 11:20)  POCT Blood Glucose.: 121 mg/dL (13 Aug 2021 05:46)  POCT Blood Glucose.: 128 mg/dL (13 Aug 2021 00:35)  POCT Blood Glucose.: 93 mg/dL (12 Aug 2021 15:59)      Anion Gap, Serum: 16 (08-13 @ 00:49)  Anion Gap, Serum: 14 (08-12 @ 15:55)  Anion Gap, Serum: 14 (08-12 @ 00:22)      Calcium, Total Serum: 10.4 (08-13 @ 00:49)  Calcium, Total Serum: 10.2 (08-12 @ 15:55)  Calcium, Total Serum: 10.0 (08-12 @ 00:22)  Albumin, Serum: 3.8 (08-13 @ 00:49)  Albumin, Serum: 3.7 (08-12 @ 15:55)  Albumin, Serum: 3.8 (08-12 @ 00:22)    Alanine Aminotransferase (ALT/SGPT): 66 *H* (08-13 @ 00:49)  Alanine Aminotransferase (ALT/SGPT): 63 *H* (08-12 @ 15:55)  Alanine Aminotransferase (ALT/SGPT): 43 (08-12 @ 00:22)  Alkaline Phosphatase, Serum: 150 *H* (08-13 @ 00:49)  Alkaline Phosphatase, Serum: 148 *H* (08-12 @ 15:55)  Alkaline Phosphatase, Serum: 137 *H* (08-12 @ 00:22)  Aspartate Aminotransferase (AST/SGOT): 60 *H* (08-13 @ 00:49)  Aspartate Aminotransferase (AST/SGOT): 71 *H* (08-12 @ 15:55)  Aspartate Aminotransferase (AST/SGOT): 43 *H* (08-12 @ 00:22)        08-13    132<L>  |  93<L>  |  15  ----------------------------<  133<H>  5.0   |  23  |  0.51    Ca    10.4      13 Aug 2021 00:49  Phos  4.4     08-13  Mg     1.7     08-13    TPro  9.1<H>  /  Alb  3.8  /  TBili  0.7  /  DBili  x   /  AST  60<H>  /  ALT  66<H>  /  AlkPhos  150<H>  08-13                        10.1   8.78  )-----------( 318      ( 13 Aug 2021 00:50 )             32.0     Medications  MEDICATIONS  (STANDING):  clonazePAM  Tablet 0.25 milliGRAM(s) Oral at bedtime  heparin   Injectable 5000 Unit(s) SubCutaneous every 8 hours  insulin lispro (ADMELOG) corrective regimen sliding scale   SubCutaneous every 6 hours  methimazole 10 milliGRAM(s) Oral every 8 hours  metoclopramide Injectable 10 milliGRAM(s) IV Push every 8 hours  mirtazapine 7.5 milliGRAM(s) Oral daily  pantoprazole  Injectable 40 milliGRAM(s) IV Push every 12 hours  polyethylene glycol 3350 17 Gram(s) Oral daily  predniSONE   Tablet 40 milliGRAM(s) Oral daily  propranolol 20 milliGRAM(s) Oral every 8 hours  senna 2 Tablet(s) Oral at bedtime      Physical Exam  General: Patient comfortable in bed  Vital Signs Last 12 Hrs  T(F): 98.9 (08-13-21 @ 12:00), Max: 98.9 (08-13-21 @ 12:00)  HR: 125 (08-13-21 @ 13:00) (107 - 128)  BP: --  BP(mean): --  RR: 38 (08-13-21 @ 13:00) (8 - 73)  SpO2: 94% (08-13-21 @ 13:00) (90% - 100%)  Neck: No palpable thyroid nodules.  CVS: S1S2, No murmurs  Respiratory: No wheezing, no crepitations  GI: Abdomen soft, bowel sounds positive  Musculoskeletal:  edema lower extremities.   Skin: No skin rashes, no ecchymosis    Diagnostics    US Thyroid: Routine   Indication: hyperthyroidism  Transport: Stretcher-Crib,  w/ Monitor  Provider's Contact #: [phone] (08-08 @ 16:13)  TSH Receptor Antibody: AM Sched. Collection: 09-Aug-2021 06:00 (08-08 @ 13:31)  Thyroperoxidase Antibody: EVELINE Horton. Collection: 09-Aug-2021 06:00 (08-08 @ 13:31)           Chief complaint  Patient is a 66y old  Male who presents with a chief complaint of Anemia, Supratherapeutic INR, Dark Stools (13 Aug 2021 09:28)   Review of systems  Patient in bed, looks comfortable, no hypoglycemic episodes.    Labs and Fingersticks  CAPILLARY BLOOD GLUCOSE      POCT Blood Glucose.: 131 mg/dL (13 Aug 2021 11:20)  POCT Blood Glucose.: 121 mg/dL (13 Aug 2021 05:46)  POCT Blood Glucose.: 128 mg/dL (13 Aug 2021 00:35)  POCT Blood Glucose.: 93 mg/dL (12 Aug 2021 15:59)      Anion Gap, Serum: 16 (08-13 @ 00:49)  Anion Gap, Serum: 14 (08-12 @ 15:55)  Anion Gap, Serum: 14 (08-12 @ 00:22)      Calcium, Total Serum: 10.4 (08-13 @ 00:49)  Calcium, Total Serum: 10.2 (08-12 @ 15:55)  Calcium, Total Serum: 10.0 (08-12 @ 00:22)  Albumin, Serum: 3.8 (08-13 @ 00:49)  Albumin, Serum: 3.7 (08-12 @ 15:55)  Albumin, Serum: 3.8 (08-12 @ 00:22)    Alanine Aminotransferase (ALT/SGPT): 66 *H* (08-13 @ 00:49)  Alanine Aminotransferase (ALT/SGPT): 63 *H* (08-12 @ 15:55)  Alanine Aminotransferase (ALT/SGPT): 43 (08-12 @ 00:22)  Alkaline Phosphatase, Serum: 150 *H* (08-13 @ 00:49)  Alkaline Phosphatase, Serum: 148 *H* (08-12 @ 15:55)  Alkaline Phosphatase, Serum: 137 *H* (08-12 @ 00:22)  Aspartate Aminotransferase (AST/SGOT): 60 *H* (08-13 @ 00:49)  Aspartate Aminotransferase (AST/SGOT): 71 *H* (08-12 @ 15:55)  Aspartate Aminotransferase (AST/SGOT): 43 *H* (08-12 @ 00:22)        08-13    132<L>  |  93<L>  |  15  ----------------------------<  133<H>  5.0   |  23  |  0.51    Ca    10.4      13 Aug 2021 00:49  Phos  4.4     08-13  Mg     1.7     08-13    TPro  9.1<H>  /  Alb  3.8  /  TBili  0.7  /  DBili  x   /  AST  60<H>  /  ALT  66<H>  /  AlkPhos  150<H>  08-13                        10.1   8.78  )-----------( 318      ( 13 Aug 2021 00:50 )             32.0     Medications  MEDICATIONS  (STANDING):  clonazePAM  Tablet 0.25 milliGRAM(s) Oral at bedtime  heparin   Injectable 5000 Unit(s) SubCutaneous every 8 hours  insulin lispro (ADMELOG) corrective regimen sliding scale   SubCutaneous every 6 hours  methimazole 10 milliGRAM(s) Oral every 8 hours  metoclopramide Injectable 10 milliGRAM(s) IV Push every 8 hours  mirtazapine 7.5 milliGRAM(s) Oral daily  pantoprazole  Injectable 40 milliGRAM(s) IV Push every 12 hours  polyethylene glycol 3350 17 Gram(s) Oral daily  predniSONE   Tablet 40 milliGRAM(s) Oral daily  propranolol 20 milliGRAM(s) Oral every 8 hours  senna 2 Tablet(s) Oral at bedtime      Physical Exam  General: Patient comfortable in bed  Vital Signs Last 12 Hrs  T(F): 98.9 (08-13-21 @ 12:00), Max: 98.9 (08-13-21 @ 12:00)  HR: 125 (08-13-21 @ 13:00) (107 - 128)  BP: --  BP(mean): --  RR: 38 (08-13-21 @ 13:00) (8 - 73)  SpO2: 94% (08-13-21 @ 13:00) (90% - 100%)  Neck: No palpable thyroid nodules.  CVS: S1S2, No murmurs  Respiratory: No wheezing, no crepitations  GI: Abdomen soft, bowel sounds positive  Musculoskeletal:  edema lower extremities.   Skin: No skin rashes, no ecchymosis    Diagnostics    US Thyroid: Routine   Indication: hyperthyroidism  Transport: Stretcher[employer]Crib,  w/ Monitor  Provider's Contact #: [phone] (08-08 @ 16:13)  TSH Receptor Antibody: AM Sched. Collection: 09-Aug-2021 06:00 (08-08 @ 13:31)  Thyroperoxidase Antibody: EVELINE Horton. Collection: 09-Aug-2021 06:00 (08-08 @ 13:31)

## 2021-08-13 NOTE — CONSULT NOTE ADULT - SUBJECTIVE AND OBJECTIVE BOX
RICKY HAMPTON [id number]  66y Male  60d    HPI:  65M PMH ACC/AHA stage D HF due to NICM HM2 LVAD , TV annuloplasty ring 9/12/17 as destination therapy due to severe peripheral artery disease with significant stenosis  SIADH, Depression, CKD-3 with hyperkalemia, past E. coli UTIs, driveline drainage (1/7/21) and COVID-19 (back in April 2020). Patient was admitted 6/14 for GIB with elevated INR. He underwent multiple studies demonstrating old bleed in the small bowel but no source of active bleeding was identified. Hospital course has been complicated by intermittent GIB (AC has been discontinued for reccurent bleeding), aspiration with prolonged intubation s/p tracheostomy, c. diff + (s/p PO vancomycin course), serratia bacteremia and sepsis secondary to acalculous cholecystitis s/p percutaneous cholecystostomy tube placement. Patient has recovered from his sepsis and is currently doing well, melena has resolved, and he is ambulating and on trach collar. He is on continuous tube feeds and intermittently complains of abdominal pain, requiring feeds to be held on and off. General surgery was following patient. CTA done showing possible proximal SMA stenosis however evaluation of vessels limited by streak artifact.     PAST MEDICAL & SURGICAL HISTORY:  CHF (congestive heart failure)  CAD (coronary artery disease)  Depression  Pleural effusion  History of 2019 novel coronavirus disease (COVID-19)  april 2020  Hemorrhoids  Bleeding hemorrhoids  Peripheral arterial disease  Claudication  BPH with urinary obstruction  ACC/AHA stage D systolic heart failure  Anticoagulation goal of INR 2.0 to 2.5  Falls  Clavicle fracture  CKD (chronic kidney disease), stage III  Iron deficiency anemia  H/O epistaxis  Vertigo  GI bleed  S/P TVR (tricuspid valve repair)  S/P ventricular assist device  S/P endoscopy    MEDICATIONS  (STANDING):  chlorhexidine 0.12% Liquid 15 milliLiter(s) Oral Mucosa every 12 hours  clonazePAM  Tablet 0.25 milliGRAM(s) Oral at bedtime  dextrose 5% + sodium chloride 0.45%. 1000 milliLiter(s) (50 mL/Hr) IV Continuous <Continuous>  heparin   Injectable 5000 Unit(s) SubCutaneous every 8 hours  methimazole 10 milliGRAM(s) Oral every 8 hours  metoclopramide Injectable 10 milliGRAM(s) IV Push every 8 hours  mirtazapine 7.5 milliGRAM(s) Oral daily  pantoprazole  Injectable 40 milliGRAM(s) IV Push every 12 hours  polyethylene glycol 3350 17 Gram(s) Oral daily  predniSONE   Tablet 40 milliGRAM(s) Oral daily  propranolol 20 milliGRAM(s) Oral every 8 hours  senna 2 Tablet(s) Oral at bedtime    MEDICATIONS  (PRN):  acetaminophen    Suspension .. 650 milliGRAM(s) Oral every 6 hours PRN Mild Pain (1 - 3)  ondansetron Injectable 4 milliGRAM(s) IV Push every 6 hours PRN Nausea and/or Vomiting    Allergies  No Known Allergies  Intolerances    REVIEW OF SYSTEMS  [ ] A ten-point review of systems was otherwise negative except as noted.  [ x ] Due to altered mental status/intubation, subjective information were not able to be obtained from the patient. History was obtained, to the extent possible, from review of the chart and collateral sources of information.    Vital Signs Last 24 Hrs  T(C): 37.8 (13 Aug 2021 18:00), Max: 37.8 (13 Aug 2021 18:00)  T(F): 100 (13 Aug 2021 18:00), Max: 100 (13 Aug 2021 18:00)  HR: 141 (13 Aug 2021 18:00) (93 - 141)  RR: 22 (13 Aug 2021 18:00) (8 - 73)  SpO2: 100% (13 Aug 2021 18:00) (90% - 100%)    PHYSICAL EXAM:  GENERAL: NAD, NGT in place, trach collar  ABDOMEN: Soft, mild to moderate epigastric tenderness, no   EXTREMITIES:  No clubbing, cyanosis, or edema    LABS:  CAPILLARY BLOOD GLUCOSE  POCT Blood Glucose.: 131 mg/dL (13 Aug 2021 11:20)  POCT Blood Glucose.: 121 mg/dL (13 Aug 2021 05:46)  POCT Blood Glucose.: 128 mg/dL (13 Aug 2021 00:35)                          10.1   8.78  )-----------( 318      ( 13 Aug 2021 00:50 )             32.0         08-13    132<L>  |  93<L>  |  15  ----------------------------<  133<H>  5.0   |  23  |  0.51      Magnesium, Serum: 1.7 mg/dL (08-13-21 @ 00:49)      LFTs:             9.1  | 0.7  | 60       ------------------[150     ( 13 Aug 2021 00:49 )  3.8  | x    | 66          Lipase:x      Amylase:x         Blood Gas Arterial, Lactate: 1.0 mmol/L (08-13-21 @ 15:11)  Blood Gas Arterial, Lactate: 0.8 mmol/L (08-13-21 @ 12:27)  Blood Gas Arterial, Lactate: 0.7 mmol/L (08-13-21 @ 00:37)  Blood Gas Arterial, Lactate: 0.5 mmol/L (08-12-21 @ 15:49)  Blood Gas Arterial, Lactate: 0.6 mmol/L (08-12-21 @ 00:07)  Blood Gas Arterial, Lactate: 0.5 mmol/L (08-11-21 @ 14:18)  Blood Gas Arterial, Lactate: 0.5 mmol/L (08-11-21 @ 00:07)    ABG - ( 13 Aug 2021 15:11 )  pH: 7.40  /  pCO2: 39    /  pO2: 185   / HCO3: 24    / Base Excess: -.3   /  SaO2: 100       ABG - ( 13 Aug 2021 12:27 )  pH: 7.38  /  pCO2: 46    /  pO2: 63    / HCO3: 26    / Base Excess: 1.4   /  SaO2: 91        ABG - ( 13 Aug 2021 00:37 )  pH: 7.35  /  pCO2: 51    /  pO2: 172   / HCO3: 28    / Base Excess: 2.1   /  SaO2: 100       RADIOLOGY & ADDITIONAL STUDIES:  < from: CT Angio Abdomen and Pelvis w/ IV Cont (08.13.21 @ 14:48) >  IMPRESSION:    1.  Evaluation of the mesenteric vessels is limited by streak artifact from LVAD. There appears to be severe stenosis of the proximal SMA; abdominal mesenteric doppler is recommended for further evaluation.  2.  No small bowel findings to suggest acute mesenteric ischemia.  3.  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    < end of copied text >

## 2021-08-13 NOTE — SWALLOW BEDSIDE ASSESSMENT ADULT - SLP PERTINENT HISTORY OF CURRENT PROBLEM
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 coli UTIs, driveline drainage (1/7/21) and COVID-19 (April 2020). Seen in clinic for c/o abdominal pain and dark stools w constipation in May. He presents to [facility] ER 6/14 w/ weakness, fatigue, and + Black stools x3 days, warfarin use in setting of  LVAD. Pt has required transfusions for GIB in past. Most recently in jan 2021 pt had anemia with dark stools. No interventions at that time. Last Endoscopy done in July 2020 (negative). 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 coli UTIs, driveline drainage (1/7/21) and COVID-19 (April 2020). Seen in clinic for c/o abdominal pain and dark stools w constipation in May. He presents to [facility] ER 6/14 w/ weakness, fatigue, and + Black stools x3 days, warfarin use in setting of  LVAD. Pt has required transfusions for GIB in past. Most recently in jan 2021 pt had anemia with dark stools. No interventions at that time. Last Endoscopy done in July 2020 (negative).

## 2021-08-13 NOTE — SPEAKING VALVE EVALUATION - DIAGNOSTIC IMPRESSIONS
Pt seen for passy-darren speaking valve evaluation. Pt maintained on 40% FIO2 via trach collar. Low pressure valve placed on hub of trach. Pt was dysphonic with hoarse/breathy/hyphonic vocal quality. RR noted to be somewhat elevated prior to PMV placement (33) increase with valve use to 40s, noted with increased work of breathing with accessory muscle use as trial progressed. Pt with overall complaint of fatigue and abdominal pain (RN aware), was not clear in report of respiratory symptoms. No signs of air trapping observed. Valve removed after 5 minutes.

## 2021-08-13 NOTE — PROGRESS NOTE ADULT - PROBLEM SELECTOR PLAN 1
Thyroid US pending.. Will continue medications for now, Will continue monitoring TFTs, labs, and FU.  Discussed plan with primary team.

## 2021-08-13 NOTE — PROGRESS NOTE ADULT - SUBJECTIVE AND OBJECTIVE BOX
RICKY HAMPTON  MRN-[id number]  Patient is a 66y old  Male who presents with a chief complaint of Anemia, Supratherapeutic INR, Dark Stools (12 Aug 2021 21:50)    HPI:  65M PMH ACC/AHA stage D HF due to NICM HM2 LVAD , TV annuloplasty ring 17 as destination therapy due to severe peripheral artery disease with significant stenosis  SIADH, Depression, CKD-3 with hyperkalemia, past E. coli UTIs, driveline drainage (21) and COVID-19 (back in 2020)  He was recently seen in clinic where he complained of abdominal pain and dark stools w constipation back in May. He presents to [facility] ER today weakness and fatigue, moderate and + Black stools for three days, on coumadin secondary to warfarin use in the setting of an LVAD. Patient has required transfusions for GIB in the past. Mostly recently back in 2021 pt had anemia with dark stools. No interventions was done at that time. However Last Endoscopy was done in 2020 (negative). Today labs show patient is anemic with H/H of 4.5/16.3,. INR is 8.84 MAP in the 90s, Temp 35.1. He denies any chest pain, shortness of breath, dizziness, abd pain, nausea or vomiting.       (2021 16:57)      Surgery/Hospital Course:   admit for melena w/ anemia, INR 8.84   6/15 Capsul study (+) for small bowel bleed, balloon endoscopy (old blood in prox ileum); post EGD - septic w/ L opacity, re-intubated for concern for aspiration, TTE (Mod MR, decrease biV w/ interventricular septum boweing towards R)   bronch    +C Diff    TC    CT C/A/P: Fluid filled colon which may be 2/2 rapid transit. Small bilateral pleffs with associates. Compressive atelectasis New ISABELLE & LLL  parenchymal opacities, suspicious for pneumonia. 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    #8 Shiley trach at bedside    CPAP trials    LVAD speed increased to 9200   Bronch; Central line dc'ed   TC since , continue as tolerated. Patient transferred to SDU.    Cont PT, no trach downsize today(#8cuffed)/ Anticoagulation/ Continue TF, speech f/u/ Vanco taper    oob to chair  INR  2.47  5 mg coumadin     increased lop to 25 q8  per HF   INR today 2.64.  H&H 7.3 this AM.  Will repeat CBC at noon, and will send stool guaiac Patient with persistent abdominal tenderness, rate of tube feeds decreased.  No nausea/vomiting.     INR today 2.4H&H 9.1/.6 low flow overnight /N&V  NPO resolved for capsule study  Coumadin on hold refusing Tube feeds on D5 / normal  @50 cc/hr   INR 2.69  H&H 7..1 refusing Tube feeds on D5 2 normal  @50 cc/hr. This am + BM Anusha Oneill HF  aware- PRBC x1  GI team consulted -  NPO  plan on study in am-  D/w Dr Cadet Patient  to return to CTU for further management; 1PRBCS    Post op INR 2.2 today.  No bleeding. BC + for SM.  Pt is hypotensive requiring pressor and inotropic support.  ID follow up today on Cefepime will follow.   R PTC for PTX    CT C/A/P: sub q emphysema in R chest wall, GGO RUL, small ascites CTH negative; Abd US: GB thickening, pericholecystic fluid     Perchole drain in place continues to drain total output overnight 133.  Fever today 38.8 given tylenol.  Will repeat BC now.     duplex LE negative    Patient with persistent abdominal pain, refusing tube feeds and medications, Psych consulted    Today:    REVIEW OF SYSTEMS:  Unable to obtain secondary to pt being trached.     ICU Vital Signs Last 24 Hrs  T(C): 36.3 (13 Aug 2021 08:00), Max: 37.2 (13 Aug 2021 00:00)  T(F): 97.3 (13 Aug 2021 08:00), Max: 98.9 (13 Aug 2021 00:00)  HR: 115 (13 Aug 2021 09:00) (93 - 133)  BP: --  BP(mean): --  ABP: 114/78 (13 Aug 2021 09:00) (84/59 - 114/78)  ABP(mean): 94 (13 Aug 2021 09:00) (66 - 94)  RR: 41 (13 Aug 2021 09:00) (8 - 73)  SpO2: 100% (13 Aug 2021 09:00) (98% - 100%)      Physical Exam:  Gen:  awake and alert, NAD  CNS:  intact, nonfocal, responds to all commands  Neck: no JVD, +TC   RES : course breath sounds, no wheezing              CVS: +LVAD hum   Abd: Soft, minimally-moderately tender in RUQ by perc dawn site, NT everywhere else, positive BS throughout. Perc dawn drain site c/d/i draining bilious fluid.  Skin: No rash  Ext:  no edema    ============================I/O===========================   I&O's Detail    12 Aug 2021 07:01  -  13 Aug 2021 07:00  --------------------------------------------------------  IN:    dextrose 5% + sodium chloride 0.45%: 300 mL    Enteral Tube Flush: 520 mL    Miscellaneous Tube Feedin mL  Total IN: 1220 mL    OUT:    Drain (mL): 230 mL    Voided (mL): 1000 mL  Total OUT: 1230 mL    Total NET: -10 mL        ============================ LABS =========================                        10.1   8.78  )-----------( 318      ( 13 Aug 2021 00:50 )             32.0     08-13    132<L>  |  93<L>  |  15  ----------------------------<  133<H>  5.0   |  23  |  0.51    Ca    10.4      13 Aug 2021 00:49  Phos  4.4       Mg     1.7         TPro  9.1<H>  /  Alb  3.8  /  TBili  0.7  /  DBili  x   /  AST  60<H>  /  ALT  66<H>  /  AlkPhos  150<H>      LIVER FUNCTIONS - ( 13 Aug 2021 00:49 )  Alb: 3.8 g/dL / Pro: 9.1 g/dL / ALK PHOS: 150 U/L / ALT: 66 U/L / AST: 60 U/L / GGT: x             ABG - ( 13 Aug 2021 00:37 )  pH, Arterial: 7.35  pH, Blood: x     /  pCO2: 51    /  pO2: 172   / HCO3: 28    / Base Excess: 2.1   /  SaO2: 100                 ======================Micro/Rad/Cardio=================  Culture: Reviewed   CXR: Reviewed  Echo:Reviewed  ======================================================  PAST MEDICAL & SURGICAL HISTORY:  CHF (congestive heart failure)    CAD (coronary artery disease)    Depression    Pleural effusion    History of 2019 novel coronavirus disease (COVID-19)  2020    Hemorrhoids    Bleeding hemorrhoids    Peripheral arterial disease    Claudication    BPH with urinary obstruction    ACC/AHA stage D systolic heart failure    Anticoagulation goal of INR 2.0 to 2.5    Falls    Clavicle fracture    CKD (chronic kidney disease), stage III    Iron deficiency anemia    H/O epistaxis    Vertigo    GI bleed    S/P TVR (tricuspid valve repair)    S/P ventricular assist device    S/P endoscopy      ====================ASSESSMENT ==============  Stage D Nonischemic Cardiomyopathy, Status Post HM2 on 2017    Cardiogenic shock  Hemodynamic instability   Acute hypoxemic respiratory failure  GI bleed , Status Post Enteroscopy   Anemia, in setting of melena   Chronic Kidney Disease  Stress hyperglycemia   C.diff positive on    Hypovolemic shock  Septic shock    Plan:  ====================== NEUROLOGY=====================  Nonfocal, continue to monitor neuro status   Tylenol PRN for analgesia   Clonazepam for anxiety/sleep regimen   C/w mirtazapine for depression    acetaminophen    Suspension .. 650 milliGRAM(s) Oral every 6 hours PRN Mild Pain (1 - 3)  clonazePAM  Tablet 0.25 milliGRAM(s) Oral at bedtime  mirtazapine 7.5 milliGRAM(s) Oral daily    ==================== RESPIRATORY======================  Acute hypoxemic respiratory failure s/p #8 shiley trach on ; tolerating TC since , continue as tolerated  Requiring intermittent full vent support, continue close monitoring of respiratory rate and breathing pattern, following of ABG’s with A-line monitoring, continuous pulse oximetry monitoring.   CT chest : Sub q emphysema in R chest wall, GGO RUL     Mechanical Ventilation:  Mode: T/C 40%    ====================CARDIOVASCULAR==================  Stage D Nonischemic Cardiomyopathy, Status Post HM2 on 2017; LVAD settings 9200 with flow of 5.9  TTE : reveals at least moderate MR, mild AI, severe global LV syst dysfxn, dec RV fxn   Continue invasive hemodynamic monitoring   Propranolol for rate control of HR 2/2 Hyperthyroidism, titrate as tolerated    propranolol 20 milliGRAM(s) Oral every 8 hours    ===================HEMATOLOGIC/ONC ===================  Anemia due to acute blood loss, monitor H&H/Plts    Continue monitoring daily LDH     VTE prophylaxis, heparin   Injectable 5000 Unit(s) SubCutaneous every 8 hours    ===================== RENAL =========================  Continue monitoring urine output, I&OS, BUN/Cr   Replete lytes PRN. Keep K> 4 and Mg >2     ==================== GASTROINTESTINAL===================  CT A/P on : small ascites; ABD US showing GB wall thickening w/ surrounding pericholecystic fluid  S/p cholecystostomy tube placed on : with IR with -60cc of black bile, will monitor site closely.   Recent emesis on  in setting of abdominal pain, as per GI likely component of illeus given critical illness   If worsening abdominal pain or vomiting, recommended CT A/P to r/o toxic megacolon - continue Reglan TID  CTAP on : no acute intra-abdominal pathology notes   Intermittent bolus tube feeds TwoCal HN   Bowel regimen with Miralax and Senna   Zofran PRN for nausea     GI prophylaxis, pantoprazole  Injectable 40 milliGRAM(s) IV Push every 12 hours  polyethylene glycol 3350 17 Gram(s) Oral daily  senna 2 Tablet(s) Oral at bedtime  ondansetron Injectable 4 milliGRAM(s) IV Push every 6 hours PRN Nausea and/or Vomiting  metoclopramide Injectable 10 milliGRAM(s) IV Push every 8 hours    =======================    ENDOCRINE  =====================  Stress hyperglycemia, continue glucose control with admelog sliding scale   Thyroid US to evaluate for thyroid nodule/goiter in setting of hyperthyroid  - contraindicated at this time 2/2 trach  Continue methimazole and monitor TFTs closely    Prednisone for ?thyroiditis as per endocrinology     insulin lispro (ADMELOG) corrective regimen sliding scale   SubCutaneous every 6 hours  methimazole 10 milliGRAM(s) Oral every 8 hours  predniSONE   Tablet 40 milliGRAM(s) Oral daily    ========================INFECTIOUS DISEASE================  Afebrile, WBC within normal limits  Continue trending WBC and monitoring fever curve         Patient requires continuous monitoring with bedside rhythm monitoring, pulse ox monitoring, and intermittent blood gas analysis. Care plan discussed with ICU care team. Patient remained critical and at risk for life threatening decompensation.     By signing my name below, I, Agnieszka Cherry, attest that this documentation has been prepared under the direction and in the presence of CLAUDIA Mejia   Electronically signed: Romel Shaffer, 21 @ 09:29    I, Go Sellers, personally performed the services described in this documentation. all medical record entries made by the romel were at my direction and in my presence. I have reviewed the chart and agree that the record reflects my personal performance and is accurate and complete  Electronically signed: CLAUDIA Meija        RICKY HAMPTON  MRN-[id number]  Patient is a 66y old  Male who presents with a chief complaint of Anemia, Supratherapeutic INR, Dark Stools (12 Aug 2021 21:50)    HPI:  65M PMH ACC/AHA stage D HF due to NICM HM2 LVAD , TV annuloplasty ring 17 as destination therapy due to severe peripheral artery disease with significant stenosis  SIADH, Depression, CKD-3 with hyperkalemia, past E. coli UTIs, driveline drainage (21) and COVID-19 (back in 2020)  He was recently seen in clinic where he complained of abdominal pain and dark stools w constipation back in May. He presents to [facility] ER today weakness and fatigue, moderate and + Black stools for three days, on coumadin secondary to warfarin use in the setting of an LVAD. Patient has required transfusions for GIB in the past. Mostly recently back in 2021 pt had anemia with dark stools. No interventions was done at that time. However Last Endoscopy was done in 2020 (negative). Today labs show patient is anemic with H/H of 4.5/16.3,. INR is 8.84 MAP in the 90s, Temp 35.1. He denies any chest pain, shortness of breath, dizziness, abd pain, nausea or vomiting.     (2021 16:57)    Surgery/Hospital Course:   admit for melena w/ anemia, INR 8.84   6/15 Capsul study (+) for small bowel bleed, balloon endoscopy (old blood in prox ileum); post EGD - septic w/ L opacity, re-intubated for concern for aspiration, TTE (Mod MR, decrease biV w/ interventricular septum boweing towards R)   bronch    +C Diff    TC    CT C/A/P: Fluid filled colon which may be 2/2 rapid transit. Small bilateral pleffs with associates. Compressive atelectasis New ISABELLE & LLL  parenchymal opacities, suspicious for pneumonia. 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    #8 Shiley trach at bedside    CPAP trials    LVAD speed increased to 9200   Bronch; Central line dc'ed   TC since , continue as tolerated. Patient transferred to SDU.    Cont PT, no trach downsize today(#8cuffed)/ Anticoagulation/ Continue TF, speech f/u/ Vanco taper    oob to chair  INR  2.47  5 mg coumadin     increased lop to 25 q8  per HF   INR today 2.64.  H&H 7.3/24 this AM.  Will repeat CBC at noon, and will send stool guaiac Patient with persistent abdominal tenderness, rate of tube feeds decreased.  No nausea/vomiting.     INR today 2.4H&H 9.1/.6 low flow overnight /N&V  NPO resolved for capsule study  Coumadin on hold refusing Tube feeds on D5 / normal  @50 cc/hr   INR 2.69  H&H 7..1 refusing Tube feeds on D5 2 normal  @50 cc/hr. This am + BM Anusha Oneill HF  aware- PRBC x1  GI team consulted -  NPO  plan on study in am-  D/w Dr Cadet Patient  to return to CTU for further management; 1PRBCS    Post op INR 2.2 today.  No bleeding. BC + for SM.  Pt is hypotensive requiring pressor and inotropic support.  ID follow up today on Cefepime will follow.   R PTC for PTX    CT C/A/P: sub q emphysema in R chest wall, GGO RUL, small ascites CTH negative; Abd US: GB thickening, pericholecystic fluid     Perchole drain in place continues to drain total output overnight 133.  Fever today 38.8 given tylenol.  Will repeat BC now.     duplex LE negative    Patient with persistent abdominal pain, refusing tube feeds and medications, Psych consulted    Today: CTA A/P ordered to r/o mesenteric ischemia 2/2 persistent anorexia, nausea, vomiting. Patient tachycardic, hypertensive.    REVIEW OF SYSTEMS:  Unable to obtain secondary to pt being trached.     ICU Vital Signs Last 24 Hrs  T(C): 36.3 (13 Aug 2021 08:00), Max: 37.2 (13 Aug 2021 00:00)  T(F): 97.3 (13 Aug 2021 08:00), Max: 98.9 (13 Aug 2021 00:00)  HR: 115 (13 Aug 2021 09:00) (93 - 133)  ABP: 114/78 (13 Aug 2021 09:00) (84/59 - 114/78)  ABP(mean): 94 (13 Aug 2021 09:00) (66 - 94)  RR: 41 (13 Aug 2021 09:00) (8 - 73)  SpO2: 100% (13 Aug 2021 09:00) (98% - 100%)    Physical Exam:  Gen:  awake and alert, NAD  CNS:  intact, nonfocal, responds to all commands  Neck: no JVD, +TC   RES : course breath sounds, no wheezing              CVS: +LVAD hum   Abd: Soft, minimally-moderately tender in RUQ by perc dawn site, NT everywhere else, positive BS throughout. Perc dawn drain site c/d/i draining bilious fluid.  Skin: No rash  Ext:  no edema    ============================I/O===========================   I&O's Detail    12 Aug 2021 07:01  -  13 Aug 2021 07:00  --------------------------------------------------------  IN:    dextrose 5% + sodium chloride 0.45%: 300 mL    Enteral Tube Flush: 520 mL    Miscellaneous Tube Feedin mL  Total IN: 1220 mL    OUT:    Drain (mL): 230 mL    Voided (mL): 1000 mL  Total OUT: 1230 mL    Total NET: -10 mL    ============================ LABS =========================                        10.1   8.78  )-----------( 318      ( 13 Aug 2021 00:50 )             32.0     132<L>  |  93<L>  |  15  ----------------------------<  133<H>  5.0   |  23  |  0.51    Ca    10.4      13 Aug 2021 00:49  Phos  4.4       Mg     1.7         TPro  9.1<H>  /  Alb  3.8  /  TBili  0.7  /  DBili  x   /  AST  60<H>  /  ALT  66<H>  /  AlkPhos  150<H>      LIVER FUNCTIONS - ( 13 Aug 2021 00:49 )  Alb: 3.8 g/dL / Pro: 9.1 g/dL / ALK PHOS: 150 U/L / ALT: 66 U/L / AST: 60 U/L / GGT: x           ABG - ( 13 Aug 2021 00:37 )  pH, Arterial: 7.35  pH, Blood: x     /  pCO2: 51    /  pO2: 172   / HCO3: 28    / Base Excess: 2.1   /  SaO2: 100       ======================Micro/Rad/Cardio=================  Culture: Reviewed   CXR: Reviewed  Echo: Reviewed    ======================================================  PAST MEDICAL & SURGICAL HISTORY:  CHF (congestive heart failure)    CAD (coronary artery disease)    Depression    Pleural effusion    History of 2019 novel coronavirus disease (COVID-19)  2020    Hemorrhoids    Bleeding hemorrhoids    Peripheral arterial disease    Claudication    BPH with urinary obstruction    ACC/AHA stage D systolic heart failure    Anticoagulation goal of INR 2.0 to 2.5    Falls    Clavicle fracture    CKD (chronic kidney disease), stage III    Iron deficiency anemia    H/O epistaxis    Vertigo    GI bleed    S/P TVR (tricuspid valve repair)    S/P ventricular assist device    S/P endoscopy    ====================ASSESSMENT ==============  Stage D Nonischemic Cardiomyopathy, Status Post HM2 on 2017    Cardiogenic shock  Hemodynamic instability   Acute hypoxemic respiratory failure  GI bleed , Status Post Enteroscopy 6/14  Anemia, in setting of melena   Chronic Kidney Disease  Stress hyperglycemia   C.diff positive on    Hypovolemic shock  Septic shock    Plan:  ====================== NEUROLOGY=====================  Nonfocal, continue to monitor neuro status   Tylenol PRN for analgesia   Clonazepam for anxiety/sleep regimen   C/w mirtazapine for depression    acetaminophen    Suspension .. 650 milliGRAM(s) Oral every 6 hours PRN Mild Pain (1 - 3)  clonazePAM  Tablet 0.25 milliGRAM(s) Oral at bedtime  mirtazapine 7.5 milliGRAM(s) Oral daily    ==================== RESPIRATORY======================  Acute hypoxemic respiratory failure s/p #8 shiley trach on ; tolerating TC since , continue as tolerated  Requiring intermittent full vent support, continue close monitoring of respiratory rate and breathing pattern, following of ABG’s with A-line monitoring, continuous pulse oximetry monitoring.   CT chest : Sub q emphysema in R chest wall, GGO RUL     Mechanical Ventilation:  Mode: T/C 40%    ====================CARDIOVASCULAR==================  Stage D Nonischemic Cardiomyopathy, Status Post HM2 on 2017; LVAD settings 9200 with flow of 5.9  TTE : reveals at least moderate MR, mild AI, severe global LV syst dysfxn, dec RV fxn   Continue invasive hemodynamic monitoring   Propranolol for rate control of HR 2/2 Hyperthyroidism, titrate as tolerated    propranolol 20 milliGRAM(s) Oral every 8 hours    ===================HEMATOLOGIC/ONC ===================  Anemia due to acute blood loss, monitor H&H/Plts    Continue monitoring daily LDH     VTE prophylaxis, heparin   Injectable 5000 Unit(s) SubCutaneous every 8 hours    ===================== RENAL =========================  Continue monitoring urine output, I&OS, BUN/Cr   Replete lytes PRN. Keep K> 4 and Mg >2     ==================== GASTROINTESTINAL===================  CT A/P on : small ascites; ABD US showing GB wall thickening w/ surrounding pericholecystic fluid  S/p cholecystostomy tube placed on : with IR with -60cc of black bile, will monitor site closely.   Recent emesis on  in setting of abdominal pain, as per GI likely component of illeus given critical illness   If worsening abdominal pain or vomiting, recommended CT A/P to r/o toxic megacolon - continue Reglan TID  CTAP on : no acute intra-abdominal pathology notes   Intermittent bolus tube feeds TwoCal HN   Bowel regimen with Miralax and Senna   Zofran PRN for nausea     GI prophylaxis, pantoprazole  Injectable 40 milliGRAM(s) IV Push every 12 hours  polyethylene glycol 3350 17 Gram(s) Oral daily  senna 2 Tablet(s) Oral at bedtime  ondansetron Injectable 4 milliGRAM(s) IV Push every 6 hours PRN Nausea and/or Vomiting  metoclopramide Injectable 10 milliGRAM(s) IV Push every 8 hours    =======================    ENDOCRINE  =====================  Stress hyperglycemia, continue glucose control with admelog sliding scale   Thyroid US to evaluate for thyroid nodule/goiter in setting of hyperthyroid  - contraindicated at this time 2/2 trach  Continue methimazole and monitor TFTs closely    Prednisone for ?thyroiditis as per endocrinology     insulin lispro (ADMELOG) corrective regimen sliding scale   SubCutaneous every 6 hours  methimazole 10 milliGRAM(s) Oral every 8 hours  predniSONE   Tablet 40 milliGRAM(s) Oral daily    ========================INFECTIOUS DISEASE================  Afebrile, WBC within normal limits  Continue trending WBC and monitoring fever curve     I have spent 40 minutes of noncontinuous critical care time with this patient.    Patient requires continuous monitoring with bedside rhythm monitoring, pulse ox monitoring, and intermittent blood gas analysis. Care plan discussed with ICU care team. Patient remained critical and at risk for life threatening decompensation.     By signing my name below, I, Agnieszka Cherry, attest that this documentation has been prepared under the direction and in the presence of CLAUDIA Mejia   Electronically signed: Cesia Shaffer, 21 @ 09:29    I, Go Sellers, personally performed the services described in this documentation. all medical record entries made by the luisibmel were at my direction and in my presence. I have reviewed the chart and agree that the record reflects my personal performance and is accurate and complete  Electronically signed: CLAUDIA Mejia

## 2021-08-13 NOTE — CONSULT NOTE ADULT - ASSESSMENT
Assessment:  65M PMH ACC/AHA stage D HF due to NICM HM2 LVAD , TV annuloplasty ring 9/12/17 as destination therapy due to severe peripheral artery disease with significant stenosis  SIADH, Depression, CKD-3 with hyperkalemia, past E. coli UTIs, driveline drainage (1/7/21) and COVID-19, here initially for GIB prolonged hospital course, s/p tracheostomy, acalculous cholecystitis s/p perc dawn. Patient has persistent intermittent abdominal pain, CTA showing possible proximal SMA stenosis raising concerns for potential chronic mesenteric ischemia. Some epigastric tenderness on exam. No leukocytosis, no elevated lactate. CTA poor quality limited by significant streak artifact.     Plan:  - if not otherwise contraindicated, would recommend starting aspirin and statin  - recommend obtaining mesenteric duplex, keep patient NPO  - no acute vascular surgery intervention planned at this time  - will follow  - rosalia fellow and rosalia Meza    [id number]

## 2021-08-13 NOTE — PROGRESS NOTE ADULT - ASSESSMENT
Assessment  Hyperthyroidism: 66y Male with no history thyroid disease, was not on any thyroid supplements, previously euthyroid (June 2021), was on amiodarone (last dose 8/7), now in hyperthyroid state, Hashimotos, started on Methimazole 10mg TID and propanolol for tachycardia, LFTs are trending up, thyroid US pending. Repeat FT4 remains elevated without improvement, started on Prednisone.  Hyperglycemia: Patient in nondiabetic range, A1C 5.6%, now on insulin coverage, blood sugars in acceptable range, no hypoglycemias, on TFs.  CHF: on medications, stable, monitored.  Anemia: Monitored      Pavan Barone MD  Cell: [phone]  Office: [phone]       Assessment  Hyperthyroidism: 66y Male with no history thyroid disease, was not on any thyroid supplements, previously euthyroid (June 2021), was on amiodarone (last dose 8/7), now in hyperthyroid state, Hashimotos, started  on Methimazole 10mg TID and propanolol for tachycardia, LFTs are trending up, thyroid US pending. Repeat FT4 remains elevated without improvement, started on Prednisone.  Hyperglycemia: Patient in nondiabetic range, A1C 5.6%, now on insulin coverage, blood sugars in acceptable range, no hypoglycemias, on TFs.  CHF: on medications, stable, monitored.  Anemia: Monitored      Pavan Barone MD  Cell: [phone]  Office: [phone]

## 2021-08-13 NOTE — CONSULT NOTE ADULT - ATTENDING COMMENTS
I have discussed the case with the surgical house staff. I have personally seen, examined, and participated in the care of this patient. I have reviewed all pertinent clinical information.    65yoM with multiple comorbidities, advanced heart failure with LVAD admitted w/ GIB. Prolonged hospital course also with acalculous cholecystitis s/p perc dawn. Patient has been having intermittent abdominal pain with poor tolerance to tube feeds. Recent imaging suggestive of SMA stenosis. Vascular surgery consulted for evaluation of SMA stenosis.  Patient reports generalized abdominal pain although has been NPO. Unclear history of chronicity of abdominal pain. Unclear if associated with tube feeds. Has undergone endoscopy with GI without any specific findings. Has GI function.  CTA reviewed. Difficult study due to streak artifact secondary to LVAD. Reviewed with radiology. Appears CA and SONU patent. SMA with possible moderate stenosis. Prior CTA also reviewed dating back to 2017. Imaging in 2017 with patent mesenteric vessels. More recent CTA 2020 with streak artifact but all mesenteric vessels appear patent.   Patient also with normal lactate. Although today has leukocytosis and elevated LFTs.     Unclear etiology of abdominal pain. Symptoms do not appear to be consistent with intestinal angina.   Has previously been evaluated by GI. DDx include MSK vs. esophagitis vs. IBS vs. agree w/ less likely chronic mesenteric ischemia but could also consider SMA syndrome.   Recommend mesenteric duplex as CTA is poor quality given streak artifact in assessing mesenteric vessels. I have discussed the case with the surgical house staff. I have personally seen, examined, and participated in the care of this patient. I have reviewed all pertinent clinical information.     65yoM with multiple comorbidities, advanced heart failure with LVAD admitted w/ GIB. Prolonged hospital course also with acalculous cholecystitis s/p perc dawn. Patient has been having intermittent abdominal pain with poor tolerance to tube feeds. Recent imaging suggestive of SMA stenosis. Vascular surgery consulted for evaluation of SMA stenosis.  Patient reports generalized abdominal pain although has been NPO. Unclear history of chronicity of abdominal pain. Unclear if associated with tube feeds. Has undergone endoscopy with GI without any specific findings. Has GI function.  on exam, abd soft nondistended, mild diffuse tenderness but no rebound or guarding.  CTA reviewed. Difficult study due to streak artifact secondary to LVAD. Reviewed with radiology. Appears CA and SONU patent. SMA with possible moderate stenosis. Prior CTA also reviewed dating back to 2017. Imaging in 2017 with patent mesenteric vessels. More recent CTA 2020 with streak artifact but all mesenteric vessels appear patent.   Patient also with normal lactate. Although today has leukocytosis and elevated LFTs.     Unclear etiology of abdominal pain. Symptoms do not appear to be consistent with intestinal angina.   Has previously been evaluated by GI. DDx include MSK vs. esophagitis vs. IBS vs. agree w/ less likely chronic mesenteric ischemia but could also consider SMA syndrome.   Recommend mesenteric duplex as CTA is poor quality given streak artifact in assessing mesenteric vessels.

## 2021-08-14 NOTE — CONSULT NOTE ADULT - SUBJECTIVE AND OBJECTIVE BOX
INCOMPLETE NOTE    HISTORY OF PRESENT ILLNESS:    PAST MEDICAL & SURGICAL HISTORY:  CHF (congestive heart failure)    CAD (coronary artery disease)    Depression    Pleural effusion    History of 2019 novel coronavirus disease (COVID-19)  april 2020    Hemorrhoids    Bleeding hemorrhoids    Peripheral arterial disease    Claudication    BPH with urinary obstruction    ACC/AHA stage D systolic heart failure    Anticoagulation goal of INR 2.0 to 2.5    Falls    Clavicle fracture    CKD (chronic kidney disease), stage III    Iron deficiency anemia    H/O epistaxis    Vertigo    GI bleed    S/P TVR (tricuspid valve repair)    S/P ventricular assist device    S/P endoscopy        REVIEW OF SYSTEMS      General:	    Skin/Breast:  	  Ophthalmologic:  	  ENMT:	    Respiratory and Thorax:  	  Cardiovascular:	    Gastrointestinal:	    Genitourinary:	    Musculoskeletal:	    Neurological:	    Psychiatric:	    Hematology/Lymphatics:	    Endocrine:	    Allergic/Immunologic:	    MEDICATIONS  (STANDING):  chlorhexidine 0.12% Liquid 15 milliLiter(s) Oral Mucosa every 12 hours  clonazePAM  Tablet 0.25 milliGRAM(s) Oral at bedtime  dextrose 5% + sodium chloride 0.45%. 1000 milliLiter(s) (50 mL/Hr) IV Continuous <Continuous>  heparin   Injectable 5000 Unit(s) SubCutaneous every 8 hours  meropenem  IVPB 1000 milliGRAM(s) IV Intermittent every 8 hours  metoclopramide Injectable 10 milliGRAM(s) IV Push every 8 hours  mirtazapine 7.5 milliGRAM(s) Oral daily  pantoprazole  Injectable 40 milliGRAM(s) IV Push every 12 hours  predniSONE   Tablet 40 milliGRAM(s) Oral daily  propranolol 20 milliGRAM(s) Oral every 8 hours  vancomycin    Solution 125 milliGRAM(s) Oral every 6 hours  vancomycin  IVPB 1000 milliGRAM(s) IV Intermittent every 12 hours    MEDICATIONS  (PRN):  acetaminophen    Suspension .. 650 milliGRAM(s) Oral every 6 hours PRN Mild Pain (1 - 3)  ondansetron Injectable 4 milliGRAM(s) IV Push every 6 hours PRN Nausea and/or Vomiting      Allergies    No Known Allergies    Intolerances        PERTINENT MEDICATION HISTORY:    SOCIAL HISTORY:    FAMILY HISTORY:      Vital Signs Last 24 Hrs  T(C): 37.6 (14 Aug 2021 08:00), Max: 38.9 (13 Aug 2021 20:00)  T(F): 99.7 (14 Aug 2021 08:00), Max: 102 (13 Aug 2021 20:00)  HR: 108 (14 Aug 2021 10:00) (108 - 160)  BP: --  BP(mean): --  RR: 20 (14 Aug 2021 10:00) (14 - 44)  SpO2: 100% (14 Aug 2021 10:00) (90% - 100%)    Daily     Daily     PHYSICAL EXAM:      Constitutional:    Eyes:    ENMT:    Neck:    Breasts:    Back:    Respiratory:    Cardiovascular:    Gastrointestinal:    Genitourinary:    Rectal:    Extremities:    Vascular:    Neurological:    Skin:    Lymph Nodes:    Musculoskeletal:    Psychiatric:        LABS:                        10.1   22.90 )-----------( 298      ( 14 Aug 2021 00:53 )             31.8     08-14    132<L>  |  93<L>  |  27<H>  ----------------------------<  149<H>  4.7   |  25  |  0.74    Ca    10.7<H>      14 Aug 2021 00:53  Phos  2.8     08-14  Mg     1.7     08-14    TPro  9.1<H>  /  Alb  3.9  /  TBili  1.3<H>  /  DBili  x   /  AST  118<H>  /  ALT  126<H>  /  AlkPhos  148<H>  08-14          RADIOLOGY & ADDITIONAL STUDIES: INCOMPLETE NOTE    HISTORY OF PRESENT ILLNESS: 65M PMH ACC/AHA stage D HF due to NICM HM2 LVAD , TV annuloplasty ring 9/12/17 as destination therapy due to severe peripheral artery disease with significant stenosis  SIADH, Depression, CKD-3 with hyperkalemia, past E. coli UTIs, driveline drainage (1/7/21) and COVID-19 (back in April 2020). Patient was admitted 6/14 for GIB with elevated INR. He underwent multiple studies demonstrating old bleed in the small bowel but no source of active bleeding was identified. Hospital course has been complicated by intermittent GIB (AC has been discontinued for reccurent bleeding), aspiration with prolonged intubation s/p tracheostomy, c. diff + (s/p PO vancomycin course), serratia bacteremia and sepsis secondary to acalculous cholecystitis s/p percutaneous cholecystostomy tube placement. Patient has recovered from his sepsis and is currently doing well, melena has resolved, and he is ambulating and on trach collar. He is on continuous tube feeds and intermittently complains of abdominal pain, requiring feeds to be held on and off. General surgery was following patient. CTA done showing possible proximal SMA stenosis, subtle SMA vessel wall thickening and aorta. Rheuamtology consulted for evaluation, r/o vasculitis    PAST MEDICAL & SURGICAL HISTORY:  CHF (congestive heart failure)    CAD (coronary artery disease)    Depression    Pleural effusion    History of 2019 novel coronavirus disease (COVID-19)  april 2020    Hemorrhoids    Bleeding hemorrhoids    Peripheral arterial disease    Claudication    BPH with urinary obstruction    ACC/AHA stage D systolic heart failure    Anticoagulation goal of INR 2.0 to 2.5    Falls    Clavicle fracture    CKD (chronic kidney disease), stage III    Iron deficiency anemia    H/O epistaxis    Vertigo    GI bleed    S/P TVR (tricuspid valve repair)    S/P ventricular assist device    S/P endoscopy        REVIEW OF SYSTEMS      General:	    Skin/Breast:  	  Ophthalmologic:  	  ENMT:	    Respiratory and Thorax:  	  Cardiovascular:	    Gastrointestinal:	    Genitourinary:	    Musculoskeletal:	    Neurological:	    Psychiatric:	    Hematology/Lymphatics:	    Endocrine:	    Allergic/Immunologic:	    MEDICATIONS  (STANDING):  chlorhexidine 0.12% Liquid 15 milliLiter(s) Oral Mucosa every 12 hours  clonazePAM  Tablet 0.25 milliGRAM(s) Oral at bedtime  dextrose 5% + sodium chloride 0.45%. 1000 milliLiter(s) (50 mL/Hr) IV Continuous <Continuous>  heparin   Injectable 5000 Unit(s) SubCutaneous every 8 hours  meropenem  IVPB 1000 milliGRAM(s) IV Intermittent every 8 hours  metoclopramide Injectable 10 milliGRAM(s) IV Push every 8 hours  mirtazapine 7.5 milliGRAM(s) Oral daily  pantoprazole  Injectable 40 milliGRAM(s) IV Push every 12 hours  predniSONE   Tablet 40 milliGRAM(s) Oral daily  propranolol 20 milliGRAM(s) Oral every 8 hours  vancomycin    Solution 125 milliGRAM(s) Oral every 6 hours  vancomycin  IVPB 1000 milliGRAM(s) IV Intermittent every 12 hours    MEDICATIONS  (PRN):  acetaminophen    Suspension .. 650 milliGRAM(s) Oral every 6 hours PRN Mild Pain (1 - 3)  ondansetron Injectable 4 milliGRAM(s) IV Push every 6 hours PRN Nausea and/or Vomiting      Allergies    No Known Allergies    Intolerances        PERTINENT MEDICATION HISTORY:    SOCIAL HISTORY:    FAMILY HISTORY:      Vital Signs Last 24 Hrs  T(C): 37.6 (14 Aug 2021 08:00), Max: 38.9 (13 Aug 2021 20:00)  T(F): 99.7 (14 Aug 2021 08:00), Max: 102 (13 Aug 2021 20:00)  HR: 108 (14 Aug 2021 10:00) (108 - 160)  BP: --  BP(mean): --  RR: 20 (14 Aug 2021 10:00) (14 - 44)  SpO2: 100% (14 Aug 2021 10:00) (90% - 100%)    Daily     Daily     PHYSICAL EXAM:      Constitutional:    Eyes:    ENMT:    Neck:    Breasts:    Back:    Respiratory:    Cardiovascular:    Gastrointestinal:    Genitourinary:    Rectal:    Extremities:    Vascular:    Neurological:    Skin:    Lymph Nodes:    Musculoskeletal:    Psychiatric:        LABS:                        10.1   22.90 )-----------( 298      ( 14 Aug 2021 00:53 )             31.8     08-14    132<L>  |  93<L>  |  27<H>  ----------------------------<  149<H>  4.7   |  25  |  0.74    Ca    10.7<H>      14 Aug 2021 00:53  Phos  2.8     08-14  Mg     1.7     08-14    TPro  9.1<H>  /  Alb  3.9  /  TBili  1.3<H>  /  DBili  x   /  AST  118<H>  /  ALT  126<H>  /  AlkPhos  148<H>  08-14          RADIOLOGY & ADDITIONAL STUDIES: HISTORY OF PRESENT ILLNESS: 65M PMH ACC/AHA stage D HF due to NICM HM2 LVAD , TV annuloplasty ring 9/12/17 as destination therapy due to severe peripheral artery disease with significant stenosis  SIADH, Depression, CKD-3 with hyperkalemia, past E. coli UTIs, driveline drainage (1/7/21) and COVID-19 (back in April 2020). Patient was admitted 6/14 for GIB with elevated INR. He underwent multiple studies demonstrating old bleed in the small bowel but no source of active bleeding was identified. Hospital course has been complicated by intermittent GIB (AC has been discontinued for reccurent bleeding), aspiration with prolonged intubation s/p tracheostomy, c. diff + (s/p PO vancomycin course), serratia bacteremia and sepsis secondary to acalculous cholecystitis s/p percutaneous cholecystostomy tube placement. Patient has recovered from his sepsis and is currently doing well, melena has resolved, and he is ambulating and on trach collar. He is on continuous tube feeds and intermittently complains of abdominal pain, requiring feeds to be held on and off. General surgery was following patient. CTA done showing possible proximal SMA stenosis, subtle SMA vessel wall thickening and aorta. Rheuamtology consulted for evaluation, r/o vasculitis    Patient is poor historian given trach status, but able to yes/no. Patient admitted to abdominal pain, generalized, for last 3 weeks. Denied recent fevers, chills, night sweats, rash, changes in vision, eye pain/redness, oral ulcers, dry eye/mouth, weakness, numbness, paresthesias, hx of asthma/eczema/recurrent sinopulmonary infections, HA, weight loss, joint pain/stiffness/swelling, chest pain, SOB, n/v/d/c, back pain, changes in urinary freq, hematuria, foamy urine, alopecia, hx autoimmune disease in family, Raynaud's.     PAST MEDICAL & SURGICAL HISTORY:  CHF (congestive heart failure)    CAD (coronary artery disease)    Depression    Pleural effusion    History of 2019 novel coronavirus disease (COVID-19)  april 2020    Hemorrhoids    Bleeding hemorrhoids    Peripheral arterial disease    Claudication    BPH with urinary obstruction    ACC/AHA stage D systolic heart failure    Anticoagulation goal of INR 2.0 to 2.5    Falls    Clavicle fracture    CKD (chronic kidney disease), stage III    Iron deficiency anemia    H/O epistaxis    Vertigo    GI bleed    S/P TVR (tricuspid valve repair)    S/P ventricular assist device    S/P endoscopy        REVIEW OF SYSTEMS  As per HPI    MEDICATIONS  (STANDING):  chlorhexidine 0.12% Liquid 15 milliLiter(s) Oral Mucosa every 12 hours  clonazePAM  Tablet 0.25 milliGRAM(s) Oral at bedtime  dextrose 5% + sodium chloride 0.45%. 1000 milliLiter(s) (50 mL/Hr) IV Continuous <Continuous>  heparin   Injectable 5000 Unit(s) SubCutaneous every 8 hours  meropenem  IVPB 1000 milliGRAM(s) IV Intermittent every 8 hours  metoclopramide Injectable 10 milliGRAM(s) IV Push every 8 hours  mirtazapine 7.5 milliGRAM(s) Oral daily  pantoprazole  Injectable 40 milliGRAM(s) IV Push every 12 hours  predniSONE   Tablet 40 milliGRAM(s) Oral daily  propranolol 20 milliGRAM(s) Oral every 8 hours  vancomycin    Solution 125 milliGRAM(s) Oral every 6 hours  vancomycin  IVPB 1000 milliGRAM(s) IV Intermittent every 12 hours    MEDICATIONS  (PRN):  acetaminophen    Suspension .. 650 milliGRAM(s) Oral every 6 hours PRN Mild Pain (1 - 3)  ondansetron Injectable 4 milliGRAM(s) IV Push every 6 hours PRN Nausea and/or Vomiting      Allergies    No Known Allergies    Intolerances        PERTINENT MEDICATION HISTORY:    SOCIAL HISTORY: Denies tobacco, alcohol, illicit drugs    FAMILY HISTORY: Denies family history of autoimmune dx, cancer, heart disease, vasculitis      Vital Signs Last 24 Hrs  T(C): 37.6 (14 Aug 2021 08:00), Max: 38.9 (13 Aug 2021 20:00)  T(F): 99.7 (14 Aug 2021 08:00), Max: 102 (13 Aug 2021 20:00)  HR: 108 (14 Aug 2021 10:00) (108 - 160)  BP: --  BP(mean): --  RR: 20 (14 Aug 2021 10:00) (14 - 44)  SpO2: 100% (14 Aug 2021 10:00) (90% - 100%)    Daily     Daily     PHYSICAL EXAM:    Constitutional: NAD; cachectic  Head: NC/AT. No temporal artery tenderness b/l. Temporal artery pulse 2+ b/l  Eyes: PERRL, clear conjunctiva  ENT: MMM  Neck: supple  Respiratory: b/l rhonchi throughout  Cardiac: +S1/S2; tachycardic, regular rhythm  Gastrointestinal: soft, ND but tenderness to palpation over the RUQ, LLQ, RLQ; +BSx4  Extremities: WWP, no clubbing or cyanosis; no peripheral edema  Musculoskeletal: NROM x4; no joint swelling, tenderness or erythema. No signs of synovitis  Vascular: 2+ radial, DP/PT pulses B/L  Dermatologic: skin warm, dry; b/l hands and R foot with nontender, brown macules on palmar and plantar surfaces.   Neurologic: Muscle strength 5/5 throughout. Left leg decreased sensation (chronic)    LABS:                        10.1   22.90 )-----------( 298      ( 14 Aug 2021 00:53 )             31.8     08-14    132<L>  |  93<L>  |  27<H>  ----------------------------<  149<H>  4.7   |  25  |  0.74    Ca    10.7<H>      14 Aug 2021 00:53  Phos  2.8     08-14  Mg     1.7     08-14    TPro  9.1<H>  /  Alb  3.9  /  TBili  1.3<H>  /  DBili  x   /  AST  118<H>  /  ALT  126<H>  /  AlkPhos  148<H>  08-14          RADIOLOGY & ADDITIONAL STUDIES:  < from: CT Angio Abdomen and Pelvis w/ IV Cont (08.13.21 @ 14:48) >  IMPRESSION:    1.  Evaluation of the mesenteric vessels is limited by streak artifact from LVAD. There appears to be severe stenosis of the proximal SMA; abdominal mesenteric doppler is recommended for further evaluation.  2.  No small bowel findings to suggest acute mesenteric ischemia.  3.  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

## 2021-08-14 NOTE — PROGRESS NOTE ADULT - ASSESSMENT
Assessment  Hyperthyroidism: 66y Male with no history thyroid disease, was not on any thyroid supplements, previously euthyroid (June 2021), was on amiodarone (last dose 8/7), now in hyperthyroid state, Hashimotos, started  on Methimazole 10mg TID and propanolol for tachycardia, LFTs are trending further up, thyroid US pending, started on Prednisone.  Hyperglycemia: Patient in nondiabetic range, A1C 5.6%, now on insulin coverage, blood sugars in acceptable range, no hypoglycemias, on TFs.  CHF: on medications, stable, monitored.  Anemia: Monitored      Pavan Barone MD  Cell: [phone]  Office: [phone]

## 2021-08-14 NOTE — PROGRESS NOTE ADULT - SUBJECTIVE AND OBJECTIVE BOX
Vascular Surgery    Complains of pain, WBC elevated o/n    Vital Signs Last 24 Hrs  T(C): 37.6 (21 @ 08:00), Max: 38.9 (21 @ 20:00)  T(F): 99.7 (21 @ 08:00), Max: 102 (21 @ 20:00)  HR: 108 (21 @ 10:00) (108 - 160)  BP: --  BP(mean): --  RR: 20 (21 @ 10:00) (14 - 44)  SpO2: 100% (21 @ 10:00) (90% - 100%)  I&O's Detail    13 Aug 2021 07:01  -  14 Aug 2021 07:00  --------------------------------------------------------  IN:    dextrose 5% + sodium chloride 0.45%: 850 mL    Enteral Tube Flush: 120 mL    IV PiggyBack: 300 mL    Miscellaneous Tube Feedin mL  Total IN: 1370 mL    OUT:    Drain (mL): 120 mL    Nasogastric/Oral tube (mL): 150 mL    Voided (mL): 450 mL  Total OUT: 720 mL    Total NET: 650 mL      14 Aug 2021 07:01  -  14 Aug 2021 10:46  --------------------------------------------------------  IN:  Total IN: 0 mL    OUT:    Voided (mL): 180 mL  Total OUT: 180 mL    Total NET: -180 mL    PE  GENERAL: NAD, NGT in place, trach collar  ABDOMEN: Soft, mild to moderate epigastric tenderness                          10.1   22.90 )-----------( 298      ( 14 Aug 2021 00:53 )             31.8     0814    132<L>  |  93<L>  |  27<H>  ----------------------------<  149<H>  4.7   |  25  |  0.74    Ca    10.7<H>      14 Aug 2021 00:53  Phos  2.8       Mg     1.7         TPro  9.1<H>  /  Alb  3.9  /  TBili  1.3<H>  /  DBili  x   /  AST  118<H>  /  ALT  126<H>  /  AlkPhos  148<H>        CAPILLARY BLOOD GLUCOSE      POCT Blood Glucose.: 131 mg/dL (13 Aug 2021 11:20)      MEDICATIONS  (STANDING):  chlorhexidine 0.12% Liquid 15 milliLiter(s) Oral Mucosa every 12 hours  clonazePAM  Tablet 0.25 milliGRAM(s) Oral at bedtime  dextrose 5% + sodium chloride 0.45%. 1000 milliLiter(s) (50 mL/Hr) IV Continuous <Continuous>  heparin   Injectable 5000 Unit(s) SubCutaneous every 8 hours  meropenem  IVPB 1000 milliGRAM(s) IV Intermittent every 8 hours  metoclopramide Injectable 10 milliGRAM(s) IV Push every 8 hours  mirtazapine 7.5 milliGRAM(s) Oral daily  pantoprazole  Injectable 40 milliGRAM(s) IV Push every 12 hours  predniSONE   Tablet 40 milliGRAM(s) Oral daily  propranolol 20 milliGRAM(s) Oral every 8 hours  vancomycin    Solution 125 milliGRAM(s) Oral every 6 hours  vancomycin  IVPB 1000 milliGRAM(s) IV Intermittent every 12 hours    MEDICATIONS  (PRN):  acetaminophen    Suspension .. 650 milliGRAM(s) Oral every 6 hours PRN Mild Pain (1 - 3)  ondansetron Injectable 4 milliGRAM(s) IV Push every 6 hours PRN Nausea and/or Vomiting

## 2021-08-14 NOTE — PROGRESS NOTE ADULT - ASSESSMENT
Assessment and Recommendation:   · Assessment	  Assessment and recommendation :  Recurrent Acute hypoxic respiratory Failure S/P tracheostomy on full ventilatory support  50% FI02  Acute blood loss anemia S/P multiple  blood transfusion   S/P septic shock off vasopressin , levophed and off  Dobutamine   S/P sepsis with serratia marcescens in the blood S/P  cefepime  S/P cholecystostomy tube placement by IR    AF RVR back to regular sinus Rhythm   Non ischemic cardiomyopathy continue ACE inhibitor and B-Blockers   S/P Septic shock and cardiogenic shock   Stage D systolic heart failure S/P LVAD HM2   MH2 LVAD  with  TV Annuloplasty  Severe peripheral vascular disease   severe hyperglycemia on insulin coverage    critical care polyneuropathy   Anemia of Acute blood Loss   severe protein caloric malnutrition   S/P Small bowel bleeding   S/P blood and FFP transfusion   Chronic kidney disease stage III   NGT feeding Vital at 60 cc/ hr, trial of bolus feeding   GI prophylaxis with PPI

## 2021-08-14 NOTE — PROGRESS NOTE ADULT - SUBJECTIVE AND OBJECTIVE BOX
Chief complaint    Patient is a 66y old  Male who presents with a chief complaint of Anemia, Supratherapeutic INR, Dark Stools (14 Aug 2021 11:30)   Review of systems  Patient in bed, appears comfortable.    Labs and Fingersticks  CAPILLARY BLOOD GLUCOSE    Anion Gap, Serum: 14 (08-14 @ 00:53)  Anion Gap, Serum: 16 (08-13 @ 00:49)  Anion Gap, Serum: 14 (08-12 @ 15:55)      Calcium, Total Serum: 10.7 *H* (08-14 @ 00:53)  Calcium, Total Serum: 10.4 (08-13 @ 00:49)  Calcium, Total Serum: 10.2 (08-12 @ 15:55)  Albumin, Serum: 3.9 (08-14 @ 00:53)  Albumin, Serum: 3.8 (08-13 @ 00:49)  Albumin, Serum: 3.7 (08-12 @ 15:55)    Alanine Aminotransferase (ALT/SGPT): 126 *H* (08-14 @ 00:53)  Alanine Aminotransferase (ALT/SGPT): 66 *H* (08-13 @ 00:49)  Alanine Aminotransferase (ALT/SGPT): 63 *H* (08-12 @ 15:55)  Alkaline Phosphatase, Serum: 148 *H* (08-14 @ 00:53)  Alkaline Phosphatase, Serum: 150 *H* (08-13 @ 00:49)  Alkaline Phosphatase, Serum: 148 *H* (08-12 @ 15:55)  Aspartate Aminotransferase (AST/SGOT): 118 *H* (08-14 @ 00:53)  Aspartate Aminotransferase (AST/SGOT): 60 *H* (08-13 @ 00:49)  Aspartate Aminotransferase (AST/SGOT): 71 *H* (08-12 @ 15:55)        08-14    132<L>  |  93<L>  |  27<H>  ----------------------------<  149<H>  4.7   |  25  |  0.74    Ca    10.7<H>      14 Aug 2021 00:53  Phos  2.8     08-14  Mg     1.7     08-14    TPro  9.1<H>  /  Alb  3.9  /  TBili  1.3<H>  /  DBili  x   /  AST  118<H>  /  ALT  126<H>  /  AlkPhos  148<H>  08-14                        10.1   22.90 )-----------( 298      ( 14 Aug 2021 00:53 )             31.8     Medications  MEDICATIONS  (STANDING):  chlorhexidine 0.12% Liquid 15 milliLiter(s) Oral Mucosa every 12 hours  clonazePAM  Tablet 0.25 milliGRAM(s) Oral at bedtime  dextrose 5% + sodium chloride 0.45%. 1000 milliLiter(s) (50 mL/Hr) IV Continuous <Continuous>  heparin   Injectable 5000 Unit(s) SubCutaneous every 8 hours  meropenem  IVPB 1000 milliGRAM(s) IV Intermittent every 8 hours  metoclopramide Injectable 10 milliGRAM(s) IV Push every 8 hours  mirtazapine 7.5 milliGRAM(s) Oral daily  pantoprazole  Injectable 40 milliGRAM(s) IV Push every 12 hours  predniSONE   Tablet 40 milliGRAM(s) Oral daily  propranolol 20 milliGRAM(s) Oral every 8 hours  vancomycin    Solution 125 milliGRAM(s) Oral every 6 hours  vancomycin  IVPB 750 milliGRAM(s) IV Intermittent every 12 hours      Physical Exam  Culture - Sputum (collected 08-14-21 @ 01:33)  Source: .Sputum Sputum  Gram Stain (08-14-21 @ 06:43):    Moderate polymorphonuclear leukocytes per low power field    Rare Squamous epithelial cells per low power field    Numerous Gram Negative Rods per oil power field      General: Patient comfortable in bed  Vital Signs Last 12 Hrs  T(F): 99.7 (08-14-21 @ 08:00), Max: 99.7 (08-14-21 @ 08:00)  HR: 111 (08-14-21 @ 12:00) (107 - 141)  BP: --  BP(mean): --  RR: 20 (08-14-21 @ 12:00) (14 - 44)  SpO2: 100% (08-14-21 @ 12:00) (96% - 100%)  Neck: No palpable thyroid nodules.  CVS: S1S2, No murmurs  Respiratory: No wheezing, no crepitations  GI: Abdomen soft, bowel sounds positive  Musculoskeletal:  edema lower extremities.     Diagnostics    Free Thyroxine, Serum: AM Sched. Collection: 12-Aug-2021 06:00 (08-11 @ 10:29)

## 2021-08-14 NOTE — CONSULT NOTE ADULT - ATTENDING COMMENTS
Assessment: 66y Male with intolerance to feeds for 1 month with concern for SMA stenosis. CTA from current admission and 7/2020 reviewed which is limited due to LVAD but demonstrates soft tissue infiltration around the celiac and SMA, and proximal great vessels. These finding are concerning for vasculitis.    Plan: IR to defer stent placement at this time  - recommend obtaining rheumatology consult, as CTA findings can be seen in the setting of vasculitis  - Consider mesenteric duplex to assess SMA and duplex of the great vessels to assess those as well for potential vasculitis  - no current plan for SMA stenting

## 2021-08-14 NOTE — PROGRESS NOTE ADULT - ATTENDING COMMENTS
?SMA syndrome  Abdominal pain generalized  -Await mesenteric Dopplers    Cristina Agarwal MD   of Medicine, Gastroenterology  [address] [place] [address], [address]  [place] [address]  [phone]

## 2021-08-14 NOTE — PROGRESS NOTE ADULT - SUBJECTIVE AND OBJECTIVE BOX
RICKY JOINT  MRN#: [id number]  Subjective:  Pulmonary progress  : recurrent Acute hypoxic respiratory Failure ,aspiration pneumonia, NICM  , chart reviewed and H/O obtained radiological and Laboratory study reviewed patient Examined     65M PMH ACC/AHA stage D HF due to NICM HM2 LVAD , TV annuloplasty ring 17 as destination therapy due to severe peripheral artery disease with significant stenosis  SIADH, Depression, CKD-3 with hyperkalemia, past E. coli UTIs, driveline drainage (21) and COVID-19 (back in 2020)  He was recently seen in clinic where he complained of abdominal pain and dark stools w constipation back in May. He presents to [facility] ER today weakness and fatigue, moderate and + Black stools for three days, on coumadin secondary to warfarin use in the setting of an LVAD. Patient has required transfusions for GIB in the past. Mostly recently back in 2021 pt had anemia with dark stools. No interventions was done at that time. However Last Endoscopy was done in 2020 (negative). Today labs show patient is anemic with H/H of 4.5/16.3,. INR is 8.84 MAP in the 90s, Temp 35.1. He denies any chest pain, shortness of breath, dizziness, abd pain, nausea or vomiting. found to have  rectal bleeding underwent endoscopy ,old blood in the proximal ileum ,  develop sepsis with LL opacity given Antibiotics , Extubated , reintubated , Bronchoscopy on Zosyn for LL pneumonia  and Amiodarone S/P TV Annuloplasty , patient remain intubated on full ventilatory support .S/P multiple units of blood transfusion , remain on full ventilatory support on Precedex and propofol , new central IJ line , diarrhea C diff. +ve on po vancomycin and IV Flagyl,  mildly distended belly , fever start on cefepime 2gm q 8 hrs S/P tracheostomy .  new RT Subclavian central line continue on contact  isolation ,still diarrhea on C-diff antibiotics ENT follow up appreciated , trial of C-PAP as tolerated , , copious secretion from trach. site chest x ray left lower lobe pneumonia , tolerating trch. collar 50% FI02 still excessive secretion need pulmonary toilet , off Ancef antibiotic , no more diarrhea back on full support mechanical ventilator , chest x ray show improvement in LLL air space disease, more awake and responsive on tube feeding no more diarrhea , S/P  Ancef for bacteremia no fever ,ID follow up noted ,  no nausea or vomiting or diarrhea still very weak and tired , event note pulled NG tube now replaced , back on tube feeding ,still on po vancomycin , getting PT and OT at bed side , no plan for decannulation for now. , no more diarrhea receiving PT and OPT at bed side , minimal secretion from tracheostomy site , no SOB getting stronger , improve muscle tone patient transfer to monitor bed still on contact isolation for C-Difficel colitis on 50% FI02, NG tube clogged , and change to resume tube feeding still loose stool . H/H drop significantly require blood transfusion , most likely GI bleeding , IV heparin D/C , vomiting 200 cc of creamy color tube feeding on hold no sob, still melena monitor in the CTU possible capsule endoscopy , H/H is stable ., patient develop TR sided  pneumothorax require chest tube placement , RT IJ central line  placed , develop fever shaking chills , blood culture positive for serratia marcescens , start on cefepime .the patient  become hypoxic and hypotensive placed on full ventilatory support and Vasopressin , levophed and Dobutamine ,S/P blood transfusion on meropenem and vancomycin , IR note appreciated   , on and  off pressors , occasional agitation on Precedex .S/P IR cholecystostomy tube drainage placement in the RT upper Quadrant , resume anticoagulation chest x ray noted C-PAP trail lasted only for 2 hrs , new RT SC line and D/C RT IJ line , RT pig tail cathter has been removed , tolerating C-PAP trial placed on trach. collar 50% FI02 GI consultant noted on NG tube feeding as tolerated , develop AF RVR S/P  bolus Amiodarone  back to regular sinus Rhythm , Flat Affect depressed , back on tube feeding Vital AF at 60 cc/ hr .still intermittent abdominal pain , no fever saturation is accepted  back on full ventilatory support , tired and sleepy on round   Dark stool evaluated by Vascular service recommend mesenteric Duplex.        (2021 16:57)    PAST MEDICAL & SURGICAL HISTORY:  CHF (congestive heart failure)    CAD (coronary artery disease)  Depression    Pleural effusion    History of 2019 novel coronavirus disease (COVID-19)  2020    Hemorrhoids    Bleeding hemorrhoids    Peripheral arterial disease    Claudication    BPH with urinary obstruction    ACC/AHA stage D systolic heart failure  Anticoagulation goal of INR 2.0 to 2.5    Falls    Clavicle fracture    CKD (chronic kidney disease), stage III    Iron deficiency anemia    H/O epistaxis    Vertigo    GI bleed    S/P TVR (tricuspid valve repair)    S/P ventricular assist device    S/P endoscopy    OBJECTIVE:  ICU Vital Signs Last 24 Hrs  T(C): 38.2 (2021 10:00), Max: 38.5 (2021 12:00)  T(F): 100.8 (2021 10:00), Max: 101.3 (2021 12:00)  HR: 65 (2021 10:00) (61 - 69)  BP: --  BP(mean): --  ABP: 105/67 (2021 10:00) (90/54 - 113/64)  ABP(mean): 77 (2021 10:00) (63 - 77)  RR: 20 (2021 10:00) (19 - 35)  SpO2: 99% (2021 10:00) (96% - 100%)       @ : @ 07:00  --------------------------------------------------------  IN: 2693.9 mL / OUT: 1415 mL / NET: 1278.9 mL     @ 07: @ 10:49  --------------------------------------------------------  IN: 420.8 mL / OUT: 115 mL / NET: 305.8 mL  PHYSICAL EXAM:Daily   Elderly male S/P tracheostomy on full ventilatory support  50% FI02      Daily Weight in k.4 (2021 00:00)  HEENT:     + NCAT  + EOMI  - Conjuctival edema   - Icterus   - Thrush   - ETT  + NGT/OGT  Neck:         + FROM  RT SCl line JVD  - Nodes - Masses + Mid-line trachea + Tracheostomy  Chest:            normal A-P diameter    Lungs:          + CTA   + Rhonchi    - Rales    - Wheezing + Decreased  LT BS   - Dullness R L  Cardiac:       + S1 + S2    + RRR   - Irregular   - S3  - S4    - Murmurs   - Rub   - Hamman’s sign   Abdomen:    + BS  + Soft + Non-tender - Distended - Organomegaly - PEG .cholecystostomy tube in place  Extremities:   - Cyanosis U/L   - Clubbing  U/L  + LE/UE Edema   + Capillary refill    + Pulses   Neuro:        - Awake   -  Alert   - Confused   - Lethargic   + Sedated  + Generalized Weakness  Skin:        - Rashes    - Erythema   + Normal incisions   + IV sites intact          HOSPITAL MEDICATIONS: All medications reviewed and analyzed  MEDICATIONS  (STANDING):  amiodarone    Tablet 200 milliGRAM(s) Oral daily  chlorhexidine 0.12% Liquid 15 milliLiter(s) Oral Mucosa every 12 hours  chlorhexidine 2% Cloths 1 Application(s) Topical <User Schedule>  dexmedetomidine Infusion 0.5 MICROgram(s)/kG/Hr (9.81 mL/Hr) IV Continuous <Continuous>  dextrose 50% Injectable 50 milliLiter(s) IV Push every 15 minutes  heparin  Infusion 400 Unit(s)/Hr (12.5 mL/Hr) IV Continuous <Continuous>  Hydromorphone  Injectable 0.5 milliGRAM(s) IV Push once  insulin lispro (ADMELOG) corrective regimen sliding scale   SubCutaneous every 6 hours  pantoprazole  Injectable 40 milliGRAM(s) IV Push every 12 hours  piperacillin/tazobactam IVPB.. 3.375 Gram(s) IV Intermittent every 8 hours  propofol Infusion 20 MICROgram(s)/kG/Min (9.42 mL/Hr) IV Continuous <Continuous>  sodium chloride 0.9% lock flush 3 milliLiter(s) IV Push every 8 hours  sodium chloride 0.9%. 1000 milliLiter(s) (10 mL/Hr) IV Continuous <Continuous>    MEDICATIONS  (PRN):  acetaminophen    Suspension .. 650 milliGRAM(s) Oral every 6 hours PRN Temp greater or equal to 38C (100.4F)    LABS: All Lab data reviewed and analyzed                        10.1   22.90 )-----------( 298      ( 14 Aug 2021 00:53 )             31.8   08-14    132<L>  |  93<L>  |  27<H>  ----------------------------<  149<H>  4.7   |  25  |  0.74    Ca    10.7<H>      14 Aug 2021 00:53  Phos  2.8     08-14  Mg     1.7     14    TPro  9.1<H>  /  Alb  3.9  /  TBili  1.3<H>  /  DBili  x   /  AST  118<H>  /  ALT  126<H>  /  AlkPhos  148<H>      Ca    10.0      12 Aug 2021 00:22  Phos  4.4     08-12  Mg     1.8     -    TPro  8.4<H>  /  Alb  3.8  /  TBili  0.5  /  DBili  x   /  AST  43<H>  /  ALT  43  /  AlkPhos  137<H>      Ca    9.6      11 Aug 2021 00:29  Phos  3.6     08-11  Mg     1.7     -    TPro  8.4<H>  /  Alb  3.7  /  TBili  0.5  /  DBili  x   /  AST  29  /  ALT  32  /  AlkPhos  145<H>      Ca    9.7      10 Aug 2021 00:51  Phos  3.5     08-10  Mg     1.7     -10    TPro  7.8  /  Alb  3.5  /  TBili  0.6  /  DBili  x   /  AST  33  /  ALT  30  /  AlkPhos  140<H>  08-10      Ca    9.1      09 Aug 2021 00:33  Phos  3.3     08-09  Mg     1.5     -    TPro  7.7  /  Alb  3.6  /  TBili  0.6  /  DBili  x   /  AST  25  /  ALT  24  /  AlkPhos  137<H>                                                                                     PTT - ( 2021 04:52 )  PTT:45.2 sec LIVER FUNCTIONS - ( 2021 00:42 )  Alb: 3.4 g/dL / Pro: 6.7 g/dL / ALK PHOS: 213 U/L / ALT: 15 U/L / AST: 24 U/L / GGT: x           RADIOLOGY: - Reviewed and analyzed RT Pig tail cathter  , LVAD HM2, CT scan of abdomen reviewed result noted

## 2021-08-14 NOTE — PROGRESS NOTE ADULT - PROBLEM SELECTOR PLAN 1
Will DC Methimazole for now.  Thyroid US pending.. Will continue medications for now, Will continue monitoring TFTs, labs, and FU.  Discussed plan with primary team.

## 2021-08-14 NOTE — CONSULT NOTE ADULT - ATTENDING COMMENTS
pt seen and examined by me personally  agree with above   our team will follow as data is resulted \

## 2021-08-14 NOTE — PROGRESS NOTE ADULT - SUBJECTIVE AND OBJECTIVE BOX
RICKY HAMPTON  MRN-[id number]  Patient is a 66y old  Male who presents with a chief complaint of Anemia, Supratherapeutic INR, Dark Stools (12 Aug 2021 21:50)    HPI:  65M PMH ACC/AHA stage D HF due to NICM HM2 LVAD , TV annuloplasty ring 17 as destination therapy due to severe peripheral artery disease with significant stenosis  SIADH, Depression, CKD-3 with hyperkalemia, past E. coli UTIs, driveline drainage (21) and COVID-19 (back in 2020)  He was recently seen in clinic where he complained of abdominal pain and dark stools w constipation back in May. He presents to [facility] ER today weakness and fatigue, moderate and + Black stools for three days, on coumadin secondary to warfarin use in the setting of an LVAD. Patient has required transfusions for GIB in the past. Mostly recently back in 2021 pt had anemia with dark stools. No interventions was done at that time. However Last Endoscopy was done in 2020 (negative). Today labs show patient is anemic with H/H of 4.5/16.3,. INR is 8.84 MAP in the 90s, Temp 35.1. He denies any chest pain, shortness of breath, dizziness, abd pain, nausea or vomiting.     (2021 16:57)    Surgery/Hospital Course:   admit for melena w/ anemia, INR 8.84   6/15 Capsul study (+) for small bowel bleed, balloon endoscopy (old blood in prox ileum); post EGD - septic w/ L opacity, re-intubated for concern for aspiration, TTE (Mod MR, decrease biV w/ interventricular septum boweing towards R)   bronch    +C Diff    TC    CT C/A/P: Fluid filled colon which may be 2/2 rapid transit. Small bilateral pleffs with associates. Compressive atelectasis New ISABELLE & LLL  parenchymal opacities, suspicious for pneumonia. 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    #8 Shiley trach at bedside    CPAP trials    LVAD speed increased to 9200   Bronch; Central line dc'ed   TC since , continue as tolerated. Patient transferred to SDU.    Cont PT, no trach downsize today(#8cuffed)/ Anticoagulation/ Continue TF, speech f/u/ Vanco taper    oob to chair  INR  2.47  5 mg coumadin     increased lop to 25 q8  per HF   INR today 2.64.  H&H 7.3 this AM.  Will repeat CBC at noon, and will send stool guaiac Patient with persistent abdominal tenderness, rate of tube feeds decreased.  No nausea/vomiting.     INR today 2.4H&H 9.1.6 low flow overnight /N&V  NPO resolved for capsule study  Coumadin on hold refusing Tube feeds on D5 1/2 normal  @50 cc/hr   INR 2.69  H&H 7..1 refusing Tube feeds on D5 /2 normal  @50 cc/hr. This am + BM Anusha Oneill HF  aware- PRBC x1  GI team consulted -  NPO  plan on study in am-  D/w Dr Cadet Patient  to return to CTU for further management; 1PRBCS    Post op INR 2.2 today.  No bleeding. BC + for SM.  Pt is hypotensive requiring pressor and inotropic support.  ID follow up today on Cefepime will follow.   R PTC for PTX    CT C/A/P: sub q emphysema in R chest wall, GGO RUL, small ascites CTH negative; Abd US: GB thickening, pericholecystic fluid     Perchole drain in place continues to drain total output overnight 133.  Fever today 38.8 given tylenol.  Will repeat BC now.     duplex LE negative    Patient with persistent abdominal pain, refusing tube feeds and medications, Psych consulted   CTA A/P ordered to r/o mesenteric ischemia 2/2 persistent anorexia, nausea, vomiting. Revealed:  Evaluation of the mesenteric vessels is limited by streak artifact from LVAD. There appears to be severe stenosis of the proximal SMA; abdominal mesenteric doppler is recommended for further evaluation. 2.  No small bowel findings to suggest acute mesenteric ischemia. 3.  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    Today: F/u with vascular surgery, IR re: potential SMA stent. Awaiting mesenteric duplex. Consider restarting trophic feeds. New fever, leukocytosis overnight. Pancultured, broad spectrum abx started. Methimazole d/c'ed by endocrine 2/2 transaminitis.     REVIEW OF SYSTEMS:  Unable to obtain secondary to pt being trached.     ICU Vital Signs Last 24 Hrs  T(C): 36.3 (13 Aug 2021 08:00), Max: 37.2 (13 Aug 2021 00:00)  T(F): 97.3 (13 Aug 2021 08:00), Max: 98.9 (13 Aug 2021 00:00)  HR: 115 (13 Aug 2021 09:00) (93 - 133)  ABP: 114/78 (13 Aug 2021 09:00) (84/59 - 114/78)  ABP(mean): 94 (13 Aug 2021 09:) (66 - 94)  RR: 41 (13 Aug 2021 09:00) (8 - 73)  SpO2: 100% (13 Aug 2021 09:) (98% - 100%)    Physical Exam:  Gen:  awake and alert, NAD  CNS:  intact, nonfocal, responds to all commands  Neck: no JVD, +TC   RES : course breath sounds, no wheezing              CVS: +LVAD hum   Abd: Soft, minimally-moderately tender in RUQ by perc dawn site, NT everywhere else, positive BS throughout. Perc dawn drain site c/d/i draining bilious fluid.  Skin: No rash  Ext:  no edema    ============================I/O===========================  I&O's Detail    13 Aug 2021 07:01  -  14 Aug 2021 07:00  --------------------------------------------------------  IN:    dextrose 5% + sodium chloride 0.45%: 850 mL    Enteral Tube Flush: 120 mL    IV PiggyBack: 300 mL    Miscellaneous Tube Feedin mL  Total IN: 1370 mL    OUT:    Drain (mL): 120 mL    Nasogastric/Oral tube (mL): 150 mL    Voided (mL): 450 mL  Total OUT: 720 mL    Total NET: 650 mL    14 Aug 2021 07:01  -  14 Aug 2021 13:32  --------------------------------------------------------  IN:    dextrose 5% + sodium chloride 0.45%: 250 mL  Total IN: 250 mL    OUT:    Voided (mL): 360 mL  Total OUT: 360 mL    Total NET: -110 mL    ============================ LABS =========================                     10.1   22.90 )-----------( 298      ( 14 Aug 2021 00:53 )             31.8     132<L>  |  93<L>  |  27<H>  ----------------------------<  149<H>  4.7   |  25  |  0.74    Ca    10.7<H>      14 Aug 2021 00:53  Phos  2.8       Mg     1.7         TPro  9.1<H>  /  Alb  3.9  /  TBili  1.3<H>  /  DBili  x   /  AST  118<H>  /  ALT  126<H>  /  AlkPhos  148<H>      ABG - ( 14 Aug 2021 02:38 )  pH, Arterial: 7.44  pH, Blood: x     /  pCO2: 42    /  pO2: 139   / HCO3: 28    / Base Excess: 3.6   /  SaO2: 100       ======================Micro/Rad/Cardio=================  Culture: Reviewed   CXR: Reviewed  Echo: Reviewed    ======================================================  PAST MEDICAL & SURGICAL HISTORY:  CHF (congestive heart failure)    CAD (coronary artery disease)    Depression    Pleural effusion    History of 2019 novel coronavirus disease (COVID-19)  2020    Hemorrhoids    Bleeding hemorrhoids    Peripheral arterial disease    Claudication    BPH with urinary obstruction    ACC/AHA stage D systolic heart failure    Anticoagulation goal of INR 2.0 to 2.5    Falls    Clavicle fracture    CKD (chronic kidney disease), stage III    Iron deficiency anemia    H/O epistaxis    Vertigo    GI bleed    S/P TVR (tricuspid valve repair)    S/P ventricular assist device    S/P endoscopy    ====================ASSESSMENT ==============  Stage D Nonischemic Cardiomyopathy, Status Post HM2 on 2017    Cardiogenic shock  Hemodynamic instability   Acute hypoxemic respiratory failure  GI bleed , Status Post Enteroscopy   Anemia, in setting of melena   Chronic Kidney Disease  Stress hyperglycemia   C.diff positive on    Hypovolemic shock  Septic shock    Plan:  ====================== NEUROLOGY=====================  Nonfocal, continue to monitor neuro status   Tylenol PRN for analgesia   Clonazepam for anxiety/sleep regimen   C/w mirtazapine for depression    acetaminophen    Suspension .. 650 milliGRAM(s) Oral every 6 hours PRN Mild Pain (1 - 3)  clonazePAM  Tablet 0.25 milliGRAM(s) Oral at bedtime  mirtazapine 7.5 milliGRAM(s) Oral daily    ==================== RESPIRATORY======================  Acute hypoxemic respiratory failure s/p #8 shiley trach on ; patient put back on assist control yesterday 2/2 worsening of clinical condition. Will attempt trach collar weaning today.  Requiring intermittent full vent support, continue close monitoring of respiratory rate and breathing pattern, following of ABG’s with A-line monitoring, continuous pulse oximetry monitoring.     ====================CARDIOVASCULAR==================  Stage D Nonischemic Cardiomyopathy, Status Post HM2 on 2017; LVAD settings 9200 with flow of 5.9  TTE : reveals at least moderate MR, mild AI, severe global LV syst dysfxn, dec RV fxn   Continue invasive hemodynamic monitoring   Propranolol for rate control of HR 2/2 Hyperthyroidism, titrate as tolerated    propranolol 20 milliGRAM(s) Oral every 8 hours    ===================HEMATOLOGIC/ONC ===================  Anemia due to acute blood loss, monitor H&H/Plts    Continue monitoring daily LDH   Rheumatology consulted for ?vasculitis which may be contributing to TF intolerance    VTE prophylaxis, heparin   Injectable 5000 Unit(s) SubCutaneous every 8 hours    ===================== RENAL =========================  Continue monitoring urine output, I&OS, BUN/Cr   Replete lytes PRN. Keep K> 4 and Mg >2     ==================== GASTROINTESTINAL===================  CT A/P on : small ascites; ABD US showing GB wall thickening w/ surrounding pericholecystic fluid  S/p cholecystostomy tube placed on : with IR with -60cc of black bile, will monitor site closely.   Recent emesis on  in setting of abdominal pain, as per GI likely component of illeus given critical illness   If worsening abdominal pain or vomiting, recommended CT A/P to r/o toxic megacolon - continue Reglan TID  CTAP on : no acute intra-abdominal pathology notes   Bowel regimen with Miralax and Senna   Zofran PRN for nausea     CTA A/P : Evaluation of the mesenteric vessels is limited by streak artifact from LVAD. There appears to be severe stenosis of the proximal SMA; abdominal mesenteric doppler is recommended for further evaluation. 2.  No small bowel findings to suggest acute mesenteric ischemia. 3.  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  Vascular surgery and IR consulted for potential SMA stent. Awaiting mesenteric duplex.  ?Palliative care f/u this week    GI prophylaxis, pantoprazole  Injectable 40 milliGRAM(s) IV Push every 12 hours  polyethylene glycol 3350 17 Gram(s) Oral daily  senna 2 Tablet(s) Oral at bedtime  ondansetron Injectable 4 milliGRAM(s) IV Push every 6 hours PRN Nausea and/or Vomiting  metoclopramide Injectable 10 milliGRAM(s) IV Push every 8 hours    =======================    ENDOCRINE  =====================  Stress hyperglycemia, continue glucose control with admelog sliding scale   Thyroid US to evaluate for thyroid nodule/goiter in setting of hyperthyroid  - contraindicated at this time 2/2 trach  Continue methimazole and monitor TFTs closely    Prednisone for ?thyroiditis as per endocrinology     insulin lispro (ADMELOG) corrective regimen sliding scale   SubCutaneous every 6 hours  methimazole 10 milliGRAM(s) Oral every 8 hours  predniSONE   Tablet 40 milliGRAM(s) Oral daily    ========================INFECTIOUS DISEASE================  Afebrile, WBC within normal limits  Continue trending WBC and monitoring fever curve     I have spent 45 minutes of noncontinuous critical care time with this patient.    Patient requires continuous monitoring with bedside rhythm monitoring, pulse ox monitoring, and intermittent blood gas analysis. Care plan discussed with ICU care team. Patient remained critical and at risk for life threatening decompensation.

## 2021-08-14 NOTE — PROGRESS NOTE ADULT - ASSESSMENT
66M Hx stage D NICM s/p HM2 on 9/2017 as DT (due to severe PAD) with TV ring, prior COVID-19 infection 4/2020, recurrent syncope post LVAD s/p ILR, and chronic abdominal pain with prior negative workup who was admitted with symptomatic anemia and supratherapeutic INR.     #Abd pain: pt with vacillating history regarding abdominal pain (reports location at times at per dawn/LVAD site, other times epigastric region, notes left sided pain many years prior // endorses chronicity possibly many years prior vs noted to be possibly x 1 week according to team // unclear if associated with TF). Now having brown BMs, no further recent bleeding. Ddx unclear source of pain -- possibly MSK at site of perc dawn drain/LVAD pump vs can consider esophagitis in setting of long standing NGT vs IBS vs unlikely biliary in nature (perc dawn draining well, normal LFTs) vs unlikely chronic mesenteric ischemia (vessels patent on imaging) vs unlikely SMA syndrome - now only at perc dawn site  # Acute blood loss anemia and melena - hemodynamically unchanged. Likely had recurrent GI bleed this admission. Initial melena workup lead to capsule endoscopy on 6/14/2021 which revealed active bleeding in small bowel.  Single balloon 6/15/2021 revealed old blood in proximal ileum. 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  # LVAD on anticoagulation with coumadin  # Trach  # Tension pneumo s/p chest tube placement 7/26  # Septic shock - 2/2 serratia bacteremia 2/2 cholecystitis  # Acalculous cholecystitis - s/p perc dawn  #S/p Cdiff infection    Recommendations:  - c/w PPI BID in case esophagitis 2/2 NGT contributing to abdominal pain   - f/u mesenteric dopplers and vascular surgery evaluation for possible SMA syndrome  - f/u rheum workup for possible vasculitis (soft tissue inifiltration around celiac and SMA)  - hold off on all motility agents, IBS medications  - no plans for repeat endoscopic evaluation at this time  - rest of care per ICU    GI will continue to follow.     Pal Ott, PGY5  Gastroenterology/Hepatology Fellow  Available on Microsoft Teams  [phone] ([employer] Short Range Pager)  [phone] (Long Range Pager)    After 5pm, please contact the on-call GI fellow. [phone]

## 2021-08-14 NOTE — CONSULT NOTE ADULT - ASSESSMENT
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 coli UTIs, driveline drainage (1/7/21) and COVID-19 (back in April 2020) admitted initially for GIB with complicated and prolonged hospitalization including intermittent GIB (AC has been discontinued for reccurent bleeding), aspiration with prolonged intubation s/p tracheostomy, c. diff + (s/p PO vancomycin course), serratia bacteremia and sepsis secondary to acalculous cholecystitis s/p percutaneous cholecystostomy tube placement, s/p trach. 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 This CT demonstrated celiac, SMA, and proximal great vessels with soft tissue infiltration with proximal SMA stenosis and no obvious calcification,concerning for vasculitis, identified by IR team. Per IR, these findings are same as a CTA C/A/P that was performed in 7/2020. Currently, it is not clear what the etiology of these radiographic findings are, however it seems less likely that a large-to-medium vessel vasculitis is the etiology given stability for >1 year without treatment. No prior work up identified, but hep B core antibody positive on prior admission. No other signs of symptoms of autoimmune etiology/vasculitis at this time, with exception of leukocytosis (likely 2/2 steroids) and normocytic anemia which seems likely 2/2 anemia chronic disease from prolonged hospitalization/CKD. Also within the differential is segmental arterial mediolysis,  IgG-4 related disease, infectious vs. malignant etiology.   -IR recs appreciated  -recommend Vascular cardiology evaluation  -patient started on steroids for thyroiditis, managed by endocrinology. Continue steroids per their recommendation. Would not treat for vasculitis at this time  -recommend CTA chest to compare with prior CTA Chest from 7/2020  -f/u ANCA w/reflex MPO and PR3, ODRYS, serum IL-6  65M [facility] ACC/AHA stage D HF due to NICM HM2 LVAD , TV annuloplasty ring 9/12/17 as destination therapy due to severe peripheral artery disease with significant stenosis  SIADH, Depression, CKD-3 with hyperkalemia, past E. coli UTIs, driveline drainage (1/7/21) and COVID-19 (back in April 2020) admitted initially for GIB with complicated and prolonged hospitalization including intermittent GIB (AC has been discontinued for reccurent bleeding), aspiration with prolonged intubation s/p tracheostomy, c. diff + (s/p PO vancomycin course), serratia bacteremia and sepsis secondary to acalculous cholecystitis s/p percutaneous cholecystostomy tube placement, s/p trach. 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 This CT demonstrated celiac, SMA, and proximal great vessels with soft tissue infiltration with proximal SMA stenosis and no obvious calcification,concerning for vasculitis, identified by IR team. Per IR, these findings are same as a CTA C/A/P that was performed in 7/2020. Currently, it is not clear what the etiology of these radiographic findings are, however it seems less likely that a large-to-medium vessel vasculitis is the etiology given stability for >1 year without treatment. No prior work up identified, but hep B core antibody positive on prior admission. No other signs of symptoms of autoimmune etiology/vasculitis at this time, with exception of leukocytosis (likely 2/2 steroids) and normocytic anemia which seems likely 2/2 anemia chronic disease from prolonged hospitalization/CKD. Also within the differential is segmental arterial mediolysis,  IgG-4 related disease, infectious vs. malignant etiology.   -IR recs appreciated  -recommend Vascular cardiology evaluation  -patient started on steroids for thyroiditis, managed by endocrinology. Continue steroids per their recommendation. Would not treat for vasculitis at this time  -recommend CTA chest to compare with prior CTA Chest from 7/2020  -f/u ANCA w/reflex MPO and PR3, DORYS, serum IL-6, ESR, CRP, IgG subsets, IgG4, SPEP, UPEP, immunofixation, RF, CCP (ordered)  -f/u Hep B c PCR, Quant TB, Syphilis screen (ordered), please consent and send patient for HIV screen  -f/u anemia w/u: LDH, hapto, reticulocyte count, iron studies (ordered)  -if etiology still remains unclear and clinically indicated, best test for diagnosis would be angiogram with vessel biopsy, however risks/benefits must be determined in this situation. Currently would defer at this time    Case discussed with Dr. Dumont-Mode Carreon D.O.  Rheumatology Fellow, PGY-4  Pager [phone]

## 2021-08-14 NOTE — CONSULT NOTE ADULT - ASSESSMENT
Assessment: 66y Male with intolerance to feeds for 1 month with concern for SMA stenosis. CTA from current admission and 7/2020 reviewed which is limited due to LVAD but demonstrates soft tissue infiltration around the celiac and SMA, and proximal great vessels. These finding are concerning for vasculitis.    Plan: IR to defer stent placement at this time  - recommend obtaining rheumatology consult, as CTA findings can be seen in the setting of vasculitis  - discussed with primary team    Kane Bruce MD  PGY-IV, Interventional Radiology  [facility]-p.[phone], [phone]  [facility]-p.[phone] ([phone]), [phone]   Assessment: 66y Male with intolerance to feeds for 1 month with concern for SMA stenosis. CTA from current admission and 7/2020 reviewed which is limited due to LVAD but demonstrates soft tissue infiltration around the celiac and SMA, and proximal great vessels. These finding are concerning for vasculitis.    Plan: IR to defer stent placement at this time  - recommend obtaining rheumatology consult, as CTA findings can be seen in the setting of vasculitis  - no current plan for SMA stenting  - re-consult IR as needed  - discussed with primary team    Kane Bruce MD  PGY-IV, Interventional Radiology  [facility]-p.[phone], [phone]  [facility]-p.[phone] ([phone]), [phone]

## 2021-08-14 NOTE — PROGRESS NOTE ADULT - ASSESSMENT
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 coli UTIs, driveline drainage (1/7/21) and COVID-19, here initially for GIB prolonged hospital course, s/p tracheostomy, acalculous cholecystitis s/p perc dawn. Patient has persistent intermittent abdominal pain, CTA showing possible proximal SMA stenosis raising concerns for potential chronic mesenteric ischemia. Some epigastric tenderness on exam.  CTA poor quality limited by significant streak artifact.     Plan:  - if not otherwise contraindicated, would recommend starting aspirin and statin  - recommend obtaining mesenteric duplex, keep patient NPO  - no acute vascular surgery intervention planned at this time  - will follow  - seen and examined with Dr. Meza    2116 65M PMH ACC/AHA stage D HF due to NICM HM2 LVAD , TV annuloplasty ring 9/12/17 as destination therapy due to severe peripheral artery disease with significant stenosis  SIADH, Depression, CKD-3 with hyperkalemia, past E. coli UTIs, driveline drainage (1/7/21) and COVID-19, here initially for GIB prolonged hospital course, s/p tracheostomy, acalculous cholecystitis s/p perc dawn. Patient has persistent intermittent abdominal pain, CTA showing possible proximal SMA stenosis. Some epigastric tenderness on exam.  CTA poor quality limited by significant streak artifact.     Plan:  - if not otherwise contraindicated, would recommend starting aspirin and statin  - recommend obtaining mesenteric duplex, keep patient NPO  - no acute vascular surgery intervention planned at this time  - will follow  - seen and examined with Dr. Meza    [phone]

## 2021-08-14 NOTE — PROGRESS NOTE ADULT - SUBJECTIVE AND OBJECTIVE BOX
Gastroenterology/Hepatology Progress Note      Interval Events:   GI called back due to ongoing abd pain.   Briefly, 66M Hx stage D NICM s/p HM2 on 2017 as DT (due to severe PAD) with TV ring, prior COVID-19 infection 2020, recurrent syncope post LVAD s/p ILR, and chronic abdominal pain with prior negative workup, seen by GI w/ ongoing abd pain of unclear etiology, though 2/2 MSK vs LVAD vs IBS vs SMA syndrome. Being evaluated by vascular surgery and rheumatology given c/f vasculitis seen on CT. Awaiting mesenteric doppler  Patient sometimes refusing tube feeds over past few days due to worsening abd pain. Per patient, pain is diffuse     Allergies:  No Known Allergies      Hospital Medications:  acetaminophen    Suspension .. 650 milliGRAM(s) Oral every 6 hours PRN  chlorhexidine 0.12% Liquid 15 milliLiter(s) Oral Mucosa every 12 hours  clonazePAM  Tablet 0.25 milliGRAM(s) Oral at bedtime  dextrose 5% + sodium chloride 0.45%. 1000 milliLiter(s) IV Continuous <Continuous>  heparin   Injectable 5000 Unit(s) SubCutaneous every 8 hours  meropenem  IVPB 1000 milliGRAM(s) IV Intermittent every 8 hours  metoclopramide Injectable 10 milliGRAM(s) IV Push every 8 hours  mirtazapine 7.5 milliGRAM(s) Oral daily  ondansetron Injectable 4 milliGRAM(s) IV Push every 6 hours PRN  pantoprazole  Injectable 40 milliGRAM(s) IV Push every 12 hours  predniSONE   Tablet 40 milliGRAM(s) Oral daily  propranolol 20 milliGRAM(s) Oral every 8 hours  vancomycin    Solution 125 milliGRAM(s) Oral every 6 hours  vancomycin  IVPB 750 milliGRAM(s) IV Intermittent every 12 hours        PHYSICAL EXAM:   Vital Signs:  Vital Signs Last 24 Hrs  T(C): 36.9 (14 Aug 2021 16:00), Max: 38.9 (13 Aug 2021 20:00)  T(F): 98.4 (14 Aug 2021 16:00), Max: 102 (13 Aug 2021 20:00)  HR: 110 (14 Aug 2021 16:00) (107 - 160)  BP: --  BP(mean): --  RR: 20 (14 Aug 2021 16:00) (14 - 46)  SpO2: 100% (14 Aug 2021 16:00) (96% - 100%)  Daily     Daily     GENERAL:  No acute distress  HEENT: NGT in place  CHEST: trach  HEART:  RRR  ABDOMEN:  Soft, non-tender, non-distended, normoactive bowel sounds, no masses, no hepato-splenomegaly, no signs of chronic liver disease  EXTREMITIES:  No cyanosis, clubbing, or edema  SKIN:  No rash/erythema/ecchymoses/petechiae/wounds/abscess/warm/dry  NEURO:  Alert     LABS:                        10.1   22.90 )-----------( 298      ( 14 Aug 2021 00:53 )             31.8     Mean Cell Volume: 88.8 fl (- @ 00:53)        132<L>  |  93<L>  |  27<H>  ----------------------------<  149<H>  4.7   |  25  |  0.74    Ca    10.7<H>      14 Aug 2021 00:53  Phos  2.8       Mg     1.7         TPro  9.1<H>  /  Alb  3.9  /  TBili  1.3<H>  /  DBili  x   /  AST  118<H>  /  ALT  126<H>  /  AlkPhos  148<H>      LIVER FUNCTIONS - ( 14 Aug 2021 00:53 )  Alb: 3.9 g/dL / Pro: 9.1 g/dL / ALK PHOS: 148 U/L / ALT: 126 U/L / AST: 118 U/L / GGT: x             Urinalysis Basic - ( 14 Aug 2021 12:47 )    Color: Yellow / Appearance: Clear / S.041 / pH: x  Gluc: x / Ketone: Small  / Bili: Small / Urobili: Negative   Blood: x / Protein: 100 / Nitrite: Negative   Leuk Esterase: Negative / RBC: x / WBC x   Sq Epi: x / Non Sq Epi: x / Bacteria: x      Amylase [id number]      Lipase [id number]       Ammonia--        Imaging:

## 2021-08-15 NOTE — CONSULT NOTE ADULT - SUBJECTIVE AND OBJECTIVE BOX
NUTRITION SUPPORT / TPN CONSULT NOTE    HPI:  65M PMH ACC/AHA stage D HF due to NICM HM2 LVAD , TV annuloplasty ring 9/12/17 as destination therapy due to severe peripheral artery disease with significant stenosis  SIADH, Depression, CKD-3 with hyperkalemia, past E. coli UTIs, driveline drainage (1/7/21) and COVID-19, here initially for GIB prolonged hospital course, s/p tracheostomy, acalculous cholecystitis s/p perc dawn. Patient has persistent intermittent abdominal pain.  Per CTU team, pt has been refusing tube feeds and is being evaluated for chronic mesenteric ischemia vs SMA syndrome.  Consulted for TPN.      MEDICATIONS  (STANDING):  chlorhexidine 0.12% Liquid 15 milliLiter(s) Oral Mucosa every 12 hours  clonazePAM  Tablet 0.25 milliGRAM(s) Oral at bedtime  dextrose 5% + sodium chloride 0.45%. 1000 milliLiter(s) (50 mL/Hr) IV Continuous <Continuous>  heparin   Injectable 5000 Unit(s) SubCutaneous every 8 hours  meropenem  IVPB 1000 milliGRAM(s) IV Intermittent every 8 hours  metoclopramide Injectable 10 milliGRAM(s) IV Push every 8 hours  mirtazapine 7.5 milliGRAM(s) Oral daily  niCARdipine Infusion 3 mG/Hr (15 mL/Hr) IV Continuous <Continuous>  pantoprazole  Injectable 40 milliGRAM(s) IV Push every 12 hours  predniSONE   Tablet 40 milliGRAM(s) Oral daily  propofol Infusion 21.231 MICROgram(s)/kG/Min (10 mL/Hr) IV Continuous <Continuous>  propranolol 20 milliGRAM(s) Oral every 8 hours  vancomycin    Solution 125 milliGRAM(s) Oral every 6 hours  vancomycin  IVPB 1000 milliGRAM(s) IV Intermittent every 12 hours    MEDICATIONS  (PRN):  acetaminophen    Suspension .. 650 milliGRAM(s) Oral every 6 hours PRN Mild Pain (1 - 3)  ondansetron Injectable 4 milliGRAM(s) IV Push every 6 hours PRN Nausea and/or Vomiting      PAST MEDICAL & SURGICAL HISTORY:  CHF (congestive heart failure)    CAD (coronary artery disease)    Depression    Pleural effusion    History of 2019 novel coronavirus disease (COVID-19)  april 2020    Hemorrhoids    Bleeding hemorrhoids    Peripheral arterial disease    Claudication    BPH with urinary obstruction    ACC/AHA stage D systolic heart failure    Anticoagulation goal of INR 2.0 to 2.5    Falls    Clavicle fracture    CKD (chronic kidney disease), stage III    Iron deficiency anemia    H/O epistaxis    Vertigo    GI bleed    S/P TVR (tricuspid valve repair)    S/P ventricular assist device    S/P endoscopy        FAMILY HISTORY:      ALLERGIES  No Known Allergies      REVIEW OF SYSTEMS  --------------------------------------------------------------------------------    Skin: No rashes  Head/Eyes/Ears/Mouth: No headache; Normal hearing; Normal vision w/o blurriness; No sinus pain/discomfort, sore throat  Respiratory: No dyspnea, cough, wheezing, hemoptysis  CV: No chest pain, PND, orthopnea  GI: No abdominal pain, diarrhea, constipation, nausea, vomiting, melena, hematochezia  : No increased frequency, dysuria, hematuria, nocturia  MSK: No joint pain/swelling; no back pain; no edema  Neuro: No dizziness/lightheadedness, weakness, seizures, numbness, tingling  Heme: No easy bruising or bleeding  Endo: No heat/cold intolerance  Psych: No significant nervousness, anxiety, stress, depression      VITALS  T(C): 36.9 (08-15-21 @ 08:00), Max: 37.2 (08-14-21 @ 12:00)  HR: 108 (08-15-21 @ 11:00) (98 - 121)  BP: --  RR: 22 (08-15-21 @ 11:00) (16 - 46)  SpO2: 100% (08-15-21 @ 11:00) (100% - 100%)  I&O's Detail    14 Aug 2021 07:01  -  15 Aug 2021 07:00  --------------------------------------------------------  IN:    dextrose 5% + sodium chloride 0.45%: 1200 mL    Enteral Tube Flush: 100 mL    IV PiggyBack: 812.5 mL    Propofol: 80 mL  Total IN: 2192.5 mL    OUT:    Drain (mL): 115 mL    Voided (mL): 710 mL  Total OUT: 825 mL    Total NET: 1367.5 mL      15 Aug 2021 07:01  -  15 Aug 2021 11:58  --------------------------------------------------------  IN:    dextrose 5% + sodium chloride 0.45%: 200 mL    IV PiggyBack: 250 mL  Total IN: 450 mL    OUT:    NiCARdipine: 0 mL    Propofol: 0 mL    Voided (mL): 220 mL  Total OUT: 220 mL    Total NET: 230 mL        Mode: CPAP with PS  FiO2: 40  PEEP: 5  PS: 15  MAP: 10  PIP: 21      LABS:                        8.9    17.06 )-----------( 233      ( 15 Aug 2021 00:29 )             28.2     08-15    134<L>  |  95<L>  |  24<H>  ----------------------------<  160<H>  3.6   |  24  |  0.45<L>    Ca    10.3      15 Aug 2021 00:29  Phos  2.0     08-15  Mg     1.8     08-15    TPro  8.6<H>  /  Alb  3.5  /  TBili  1.2  /  DBili  x   /  AST  39  /  ALT  83<H>  /  AlkPhos  131<H>  08-15    Ionized Calcium Levels:     CAPILLARY BLOOD GLUCOSE      CAPILLARY BLOOD GLUCOSE          ABG - ( 15 Aug 2021 00:16 )  pH, Arterial: 7.46  pH, Blood: x     /  pCO2: 41    /  pO2: 200   / HCO3: 29    / Base Excess: 4.9   /  SaO2: 100               Triglycerides, Serum: 679 mg/dL (07-28-21 @ 13:17)      PHYSICAL EXAM  --------------------------------------------------------------------------------    Physical Exam:  	Gen: NAD, thin, NGT in place  	HEENT: PERRL, trach in place  	Pulm: CTA B/L  	CV: RRR, S1S2; no rub, LVAD  	Back: No spinal or CVA tenderness; no sacral edema  	Abd: +BS, soft, nontender/nondistended, perc dawn w bilious drainage             UE: Warm, FROM, no clubbing, intact strength; no edema; no asterixis  	LE: Warm, FROM, no clubbing, intact strength; no edema  	Neuro: No focal deficits, intact gait  	Psych: Normal affect and mood  	Skin: Warm, without rashes

## 2021-08-15 NOTE — PROGRESS NOTE ADULT - ASSESSMENT
Assessment  Hyperthyroidism: 66y Male with no history thyroid disease, was not on any thyroid supplements, previously euthyroid (June 2021), was on amiodarone (last dose 8/7), now in hyperthyroid state, Hashimotos, started  on Methimazole 10mg TID and propanolol for tachycardia, LFTs are trending further up, Methimazole DCed, thyroid US pending, started on Prednisone.  Hyperglycemia: Patient in nondiabetic range, A1C 5.6%, now on insulin coverage, blood sugars in acceptable range, no hypoglycemias, on TFs.  CHF: on medications, stable, monitored.  Anemia: Monitored      Pavan Barone MD  Cell: [phone]  Office: [phone]

## 2021-08-15 NOTE — PROGRESS NOTE ADULT - SUBJECTIVE AND OBJECTIVE BOX
Chief complaint    Patient is a 66y old  Male who presents with a chief complaint of Anemia, Supratherapeutic INR, Dark Stools (15 Aug 2021 14:13)   Review of systems  Patient in bed, appears comfortable.    Labs and Fingersticks  CAPILLARY BLOOD GLUCOSE      POCT Blood Glucose.: 187 mg/dL (15 Aug 2021 13:06)      Anion Gap, Serum: 15 (08-15 @ 00:29)  Anion Gap, Serum: 14 (08-14 @ 00:53)      Calcium, Total Serum: 10.3 (08-15 @ 00:29)  Calcium, Total Serum: 10.7 *H* (08-14 @ 00:53)  Albumin, Serum: 3.5 (08-15 @ 00:29)  Albumin, Serum: 3.9 (08-14 @ 00:53)    Alanine Aminotransferase (ALT/SGPT): 83 *H* (08-15 @ 00:29)  Alanine Aminotransferase (ALT/SGPT): 126 *H* (08-14 @ 00:53)  Alkaline Phosphatase, Serum: 131 *H* (08-15 @ 00:29)  Alkaline Phosphatase, Serum: 148 *H* (08-14 @ 00:53)  Aspartate Aminotransferase (AST/SGOT): 39 (08-15 @ 00:29)  Aspartate Aminotransferase (AST/SGOT): 118 *H* (08-14 @ 00:53)        08-15    134<L>  |  95<L>  |  24<H>  ----------------------------<  160<H>  3.6   |  24  |  0.45<L>    Ca    10.3      15 Aug 2021 00:29  Phos  2.0     08-15  Mg     1.8     08-15    TPro  8.6<H>  /  Alb  3.5  /  TBili  1.2  /  DBili  x   /  AST  39  /  ALT  83<H>  /  AlkPhos  131<H>  08-15                        8.9    17.06 )-----------( 233      ( 15 Aug 2021 00:29 )             28.2     Medications  MEDICATIONS  (STANDING):  chlorhexidine 0.12% Liquid 15 milliLiter(s) Oral Mucosa every 12 hours  clonazePAM  Tablet 0.25 milliGRAM(s) Oral at bedtime  dextrose 5% + sodium chloride 0.45%. 1000 milliLiter(s) (50 mL/Hr) IV Continuous <Continuous>  heparin   Injectable 5000 Unit(s) SubCutaneous every 8 hours  meropenem  IVPB 1000 milliGRAM(s) IV Intermittent every 8 hours  metoclopramide Injectable 10 milliGRAM(s) IV Push every 8 hours  mirtazapine 7.5 milliGRAM(s) Oral daily  niCARdipine Infusion 3 mG/Hr (15 mL/Hr) IV Continuous <Continuous>  pantoprazole  Injectable 40 milliGRAM(s) IV Push every 12 hours  predniSONE   Tablet 40 milliGRAM(s) Oral daily  propofol Infusion 21.231 MICROgram(s)/kG/Min (10 mL/Hr) IV Continuous <Continuous>  propranolol 20 milliGRAM(s) Oral every 8 hours  vancomycin    Solution 125 milliGRAM(s) Oral every 6 hours  vancomycin  IVPB 1000 milliGRAM(s) IV Intermittent every 12 hours      Physical Exam  General: Patient comfortable in bed  Vital Signs Last 12 Hrs  T(F): 98.4 (08-15-21 @ 08:00), Max: 98.4 (08-15-21 @ 08:00)  HR: 105 (08-15-21 @ 16:20) (91 - 118)  BP: --  BP(mean): --  RR: 30 (08-15-21 @ 16:20) (16 - 33)  SpO2: 100% (08-15-21 @ 16:20) (100% - 100%)  Neck: No palpable thyroid nodules.  CVS: S1S2, No murmurs  Respiratory: No wheezing, no crepitations  GI: Abdomen soft, bowel sounds positive  Musculoskeletal:  edema lower extremities.     Diagnostics    Free Thyroxine, Serum: AM Sched. Collection: 12-Aug-2021 06:00 (08-11 @ 10:29)

## 2021-08-15 NOTE — PROGRESS NOTE ADULT - ASSESSMENT
67 YO M with a history of stage D NICM s/p HM2 on 9/2017 as DT (due to severe PAD) with TV ring, prior COVID-19 infection 4/2020, recurrent syncope post LVAD s/p ILR, and chronic abdominal pain with prior negative workup who was admitted with symptomatic anemia with Hgb 4.5 in setting of INR 8.8 without hemodynamic instability. He was transfused and underwent VCE which showed active bleeding in the mid small bowel but subsequent enteroscopy 6/15 did not reveal any active bleeding. He acutely decompensated after procedure with fever/hypertension, low flow alarms, and pulmonary infiltrate with hypoxia requiring intubation from probable aspiration PNA. His LDH is normal and there are no signs of overt LVAD dysfunction on echo though signs of insufficiently unloaded ventricle for which his speed has been increased. Course notable for inability to wean ventilator with persistent secretions for which he underwent tracheostomy as well as acute c diff colitis, now resolved. Worsening anemia requiring transfusion with low flow alarms, concerning for recurrent GI bleed, now with stable Hgb. Had hypotension and septic picture. Cxs pos for serratia s/p Cholecystostomy tube by IR. Sputum culture from 8/13 positive for Enterobacter for which he is being treated with IV Meropenem and IV Vancomycin with downtrending leukocytosis. He has ongoing diffuse ABD pain with inability to tolerate tube feeds. CT ABD obtained to r/o mesenteric ischemia but limited evaluation due to artifact though with some concern of stenosis of proximal SMA. Plan to pursue mesenteric artery doppler today and vascular following for possible stent. Significant concern for malnutrition and further deconditioning in the setting of not able to tolerate feeds. Should nutrition via PEG be only viable option and he is not amendable, to consult palliative and ethics for further guidance.

## 2021-08-15 NOTE — PROGRESS NOTE ADULT - ASSESSMENT
Assessment and Recommendation:   · Assessment	  Assessment and recommendation :  Recurrent Acute hypoxic respiratory Failure S/P tracheostomy on full ventilatory support 50% FI02, C-PAP trial  Acute blood loss anemia S/P multiple  blood transfusion   S/P septic shock off vasopressin , levophed and off  Dobutamine   S/P sepsis with serratia marcescens in the blood S/P  cefepime  S/P cholecystostomy tube placement by IR    AF RVR back to regular sinus Rhythm   Non ischemic cardiomyopathy continue ACE inhibitor and B-Blockers   S/P Septic shock and cardiogenic shock   Stage D systolic heart failure S/P LVAD HM2   MH2 LVAD  with  TV Annuloplasty  Severe peripheral vascular disease   severe hyperglycemia on insulin coverage    critical care polyneuropathy   Anemia of Acute blood Loss   severe protein caloric malnutrition   S/P Small bowel bleeding   S/P blood and FFP transfusion   Chronic kidney disease stage III   NGT feeding Vital at 60 cc/ hr, trial of bolus feeding   GI prophylaxis with PPI

## 2021-08-15 NOTE — PROGRESS NOTE ADULT - PROBLEM SELECTOR PLAN 1
- Continue home amiodarone 200 mg PO daily  - hemodynamically stable off   - start Nicardipine infusion given elevated MAPs 90-100s; target MAP 70-80s  - deferring on loop diuretics as CVP today 7

## 2021-08-15 NOTE — PROGRESS NOTE ADULT - ATTENDING COMMENTS
This has been a very difficult journey for Mr. Quispe. He is again febrile with positive sputum for E. Cloacae and Blood cx with GPC. Back on antibiotics. His only focal complaint is abdominal pain that is diffuse and persistent +/- tube feeds. When fed, he has had nausea/vomiting. The percutaneous cholecystostomy tube is still draining and he had repeat CT with IV contrast this week that did not reveal a focal source of pain other than a moderate-severe SMA stenosis that Vascular Surgery does not think is contributing to his discomfort (patent other mesenteric vessels).    He is refusing tube feeds despite my discussion with him that he will starve to death. He would not accept a PEG either. While we try to sort through the cause of his abdominal pain, we will need to have Palliative Care return and address GOC. The use of TPN was considered, but without a mechanism for enteral feeds, it is not a good long term option for him.    Ongoing treatment for Hashimoto's thyroiditis with methimazole, steroids, and propranolol.  Transaminases improving.  LDH stable, off AC & ASA given prior GIB.  MAPs above goal. Start nicardipine for now.  Tachycardia may be due to hyperthyroidism. Will monitor.

## 2021-08-15 NOTE — PROGRESS NOTE ADULT - SUBJECTIVE AND OBJECTIVE BOX
Subjective:  - ongoing diffuse ABD discomfort that travels also to upper chest, worse with tube feeds    Medications:  acetaminophen    Suspension .. 650 milliGRAM(s) Oral every 6 hours PRN  chlorhexidine 0.12% Liquid 15 milliLiter(s) Oral Mucosa every 12 hours  clonazePAM  Tablet 0.25 milliGRAM(s) Oral at bedtime  dextrose 5% + sodium chloride 0.45%. 1000 milliLiter(s) IV Continuous <Continuous>  heparin   Injectable 5000 Unit(s) SubCutaneous every 8 hours  meropenem  IVPB 1000 milliGRAM(s) IV Intermittent every 8 hours  metoclopramide Injectable 10 milliGRAM(s) IV Push every 8 hours  mirtazapine 7.5 milliGRAM(s) Oral daily  niCARdipine Infusion 3 mG/Hr IV Continuous <Continuous>  ondansetron Injectable 4 milliGRAM(s) IV Push every 6 hours PRN  pantoprazole  Injectable 40 milliGRAM(s) IV Push every 12 hours  predniSONE   Tablet 40 milliGRAM(s) Oral daily  propofol Infusion 21.231 MICROgram(s)/kG/Min IV Continuous <Continuous>  propranolol 20 milliGRAM(s) Oral every 8 hours  vancomycin    Solution 125 milliGRAM(s) Oral every 6 hours  vancomycin  IVPB 1000 milliGRAM(s) IV Intermittent every 12 hours      Physical Exam:    ICU Vital Signs Last 24 Hrs  T(C): 36.9 (15 Aug 2021 08:00), Max: 36.9 (14 Aug 2021 16:00)  T(F): 98.4 (15 Aug 2021 08:00), Max: 98.4 (14 Aug 2021 16:00)  HR: 118 (15 Aug 2021 12:00) (98 - 121)  BP: --  BP(mean): --  ABP: 117/83 (15 Aug 2021 12:00) (100/75 - 130/98)  ABP(mean): 97 (15 Aug 2021 12:00) (86 - 114)  RR: 23 (15 Aug 2021 12:00) (16 - 39)  SpO2: 100% (15 Aug 2021 12:00) (100% - 100%)      Weight in k.1 (15 @ 00:00)    I&O's Summary    14 Aug 2021 07:01  -  15 Aug 2021 07:00  --------------------------------------------------------  IN: 2192.5 mL / OUT: 825 mL / NET: 1367.5 mL    15 Aug 2021 07:01  -  15 Aug 2021 13:10  --------------------------------------------------------  IN: 525 mL / OUT: 220 mL / NET: 305 mL    General: No distress. Comfortable. Frail appearing  Neck: Neck supple. JVP not elevated. No masses  Chest: Clear to auscultation bilaterally  CV: + LVAD hum. No palpable pulses  Abdomen: Soft, non-distended. Tender to light palpation  Extremities: no LE edema, warm peripherally   Skin: No rashes or skin breakdown  Neurology: Alert and oriented times three. Sensation intact  Psych: Affect normal    LVAD Interrogation: Heart Mate 3  Speed: 9200  Flow: 5.9  Power: 5.9  PI: 6.1  Event: No events  No programming changes were made  Labs:                        8.9    17.06 )-----------( 233      ( 15 Aug 2021 00:29 )             28.2     08-15    134<L>  |  95<L>  |  24<H>  ----------------------------<  160<H>  3.6   |  24  |  0.45<L>    Ca    10.3      15 Aug 2021 00:29  Phos  2.0     08-15  Mg     1.8     08-15    TPro  8.6<H>  /  Alb  3.5  /  TBili  1.2  /  DBili  x   /  AST  39  /  ALT  83<H>  /  AlkPhos  131<H>  08-15              Lactate Dehydrogenase, Serum: 184 U/L (08-15 @ 02:47)  Lactate Dehydrogenase, Serum: 192 U/L (08-15 @ 00:29)  Lactate Dehydrogenase, Serum: 268 U/L ( @ 00:53)  Lactate Dehydrogenase, Serum: 219 U/L ( @ 00:49)       Subjective:  - ongoing diffuse ABD discomfort that travels also to upper chest, worse with tube feeds    Medications:  acetaminophen    Suspension .. 650 milliGRAM(s) Oral every 6 hours PRN  chlorhexidine 0.12% Liquid 15 milliLiter(s) Oral Mucosa every 12 hours  clonazePAM  Tablet 0.25 milliGRAM(s) Oral at bedtime  dextrose 5% + sodium chloride 0.45%. 1000 milliLiter(s) IV Continuous <Continuous>  heparin   Injectable 5000 Unit(s) SubCutaneous every 8 hours  meropenem  IVPB 1000 milliGRAM(s) IV Intermittent every 8 hours  metoclopramide Injectable 10 milliGRAM(s) IV Push every 8 hours  mirtazapine 7.5 milliGRAM(s) Oral daily  niCARdipine Infusion 3 mG/Hr IV Continuous <Continuous>  ondansetron Injectable 4 milliGRAM(s) IV Push every 6 hours PRN  pantoprazole  Injectable 40 milliGRAM(s) IV Push every 12 hours  predniSONE   Tablet 40 milliGRAM(s) Oral daily  propofol Infusion 21.231 MICROgram(s)/kG/Min IV Continuous <Continuous>  propranolol 20 milliGRAM(s) Oral every 8 hours  vancomycin    Solution 125 milliGRAM(s) Oral every 6 hours  vancomycin  IVPB 1000 milliGRAM(s) IV Intermittent every 12 hours      Physical Exam:    ICU Vital Signs Last 24 Hrs  T(C): 36.9 (15 Aug 2021 08:00), Max: 36.9 (14 Aug 2021 16:00)  T(F): 98.4 (15 Aug 2021 08:00), Max: 98.4 (14 Aug 2021 16:00)  HR: 118 (15 Aug 2021 12:00) (98 - 121)  BP: --  BP(mean): --  ABP: 117/83 (15 Aug 2021 12:00) (100/75 - 130/98)  ABP(mean): 97 (15 Aug 2021 12:00) (86 - 114)  RR: 23 (15 Aug 2021 12:00) (16 - 39)  SpO2: 100% (15 Aug 2021 12:00) (100% - 100%)      Weight in k.1 (15 @ 00:00)    I&O's Summary    14 Aug 2021 07:01  -  15 Aug 2021 07:00  --------------------------------------------------------  IN: 2192.5 mL / OUT: 825 mL / NET: 1367.5 mL    15 Aug 2021 07:01  -  15 Aug 2021 13:10  --------------------------------------------------------  IN: 525 mL / OUT: 220 mL / NET: 305 mL    General: No distress. Comfortable. Frail appearing  Neck: Neck supple. JVP not elevated. No masses  Chest: Clear to auscultation bilaterally  CV: + LVAD hum. No palpable pulses  Abdomen: Soft, non-distended. Tender to light palpation  Extremities: no LE edema, warm peripherally   Skin: No rashes or skin breakdown  Neurology: Alert and oriented times three. Sensation intact  Psych: Affect normal    LVAD Interrogation: Heart Mate 2  Speed: 9200  Flow: 5  Power: 5.9  PI: 6.1  Event: No events  No programming changes were made    Labs:                        8.9    17.06 )-----------( 233      ( 15 Aug 2021 00:29 )             28.2     08-15    134<L>  |  95<L>  |  24<H>  ----------------------------<  160<H>  3.6   |  24  |  0.45<L>    Ca    10.3      15 Aug 2021 00:29  Phos  2.0     08-15  Mg     1.8     08-15    TPro  8.6<H>  /  Alb  3.5  /  TBili  1.2  /  DBili  x   /  AST  39  /  ALT  83<H>  /  AlkPhos  131<H>  08-15              Lactate Dehydrogenase, Serum: 184 U/L (08-15 @ 02:47)  Lactate Dehydrogenase, Serum: 192 U/L (08-15 @ 00:29)  Lactate Dehydrogenase, Serum: 268 U/L ( @ 00:53)  Lactate Dehydrogenase, Serum: 219 U/L ( @ 00:49)

## 2021-08-15 NOTE — PROGRESS NOTE ADULT - SUBJECTIVE AND OBJECTIVE BOX
RICKY HAMPTON  MRN-[id number]  Patient is a 66y old  Male who presents with a chief complaint of Anemia, Supratherapeutic INR, Dark Stools (12 Aug 2021 21:50)    HPI:  65M PMH ACC/AHA stage D HF due to NICM HM2 LVAD , TV annuloplasty ring 9/12/17 as destination therapy due to severe peripheral artery disease with significant stenosis  SIADH, Depression, CKD-3 with hyperkalemia, past E. coli UTIs, driveline drainage (1/7/21) and COVID-19 (back in April 2020)  He was recently seen in clinic where he complained of abdominal pain and dark stools w constipation back in May. He presents to [facility] ER today weakness and fatigue, moderate and + Black stools for three days, on coumadin secondary to warfarin use in the setting of an LVAD. Patient has required transfusions for GIB in the past. Mostly recently back in jan 2021 pt had anemia with dark stools. No interventions was done at that time. However Last Endoscopy was done in July 2020 (negative). Today labs show patient is anemic with H/H of 4.5/16.3,. INR is 8.84 MAP in the 90s, Temp 35.1. He denies any chest pain, shortness of breath, dizziness, abd pain, nausea or vomiting.     (14 Jun 2021 16:57)    Surgery/Hospital Course:  6/14 admit for melena w/ anemia, INR 8.84   6/15 Capsul study (+) for small bowel bleed, balloon endoscopy (old blood in prox ileum); post EGD - septic w/ L opacity, re-intubated for concern for aspiration, TTE (Mod MR, decrease biV w/ interventricular septum boweing towards R)  6/17 bronch   6/20 +C Diff   6/22 TC   6/22 CT C/A/P: Fluid filled colon which may be 2/2 rapid transit. Small bilateral pleffs with associates. Compressive atelectasis New ISABELLE & LLL  parenchymal opacities, suspicious for pneumonia. 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   6/25 #8 Shiley trach at bedside   6/28 CPAP trials   7/1 LVAD speed increased to 9200  7/2 Bronch; Central line dc'ed  7/20 TC since 7/7, continue as tolerated. Patient transferred to SDU.   7/21 Cont PT, no trach downsize today(#8cuffed)/ Anticoagulation/ Continue TF, speech f/u/ Vanco taper  7/22  oob to chair  INR  2.47  5 mg coumadin     increased lop to 25 q8  per HF  7/23 INR today 2.64.  H&H 7.3/24 this AM.  Will repeat CBC at noon, and will send stool guaiac Patient with persistent abdominal tenderness, rate of tube feeds decreased.  No nausea/vomiting.    7/24 INR today 2.4H&H 9.1/28.6 low flow overnight /N&V  NPO resolved for capsule study mondayn Coumadin on hold refusing Tube feeds on D5 1/2 normal  @50 cc/hr  7/25 INR 2.69  H&H 7.7/25.1 refusing Tube feeds on D5 1/2 normal  @50 cc/hr. This am + BM Anusha Oneill HF  aware- PRBC x1  GI team consulted -  NPO  plan on study in am-  D/w Dr Cadet Patient  to return to CTU for further management; 1PRBCS   7/27 Post op INR 2.2 today.  No bleeding. BC + for SM.  Pt is hypotensive requiring pressor and inotropic support.  ID follow up today on Cefepime will follow.  7/26 R PTC for PTX   7/28 CT C/A/P: sub q emphysema in R chest wall, GGO RUL, small ascites CTH negative; Abd US: GB thickening, pericholecystic fluid    7/31 Perchole drain in place continues to drain total output overnight 133.  Fever today 38.8 given tylenol.  Will repeat BC now.    8/1 duplex LE negative   8/7 Patient with persistent abdominal pain, refusing tube feeds and medications, Psych consulted  8/13 CTA A/P ordered to r/o mesenteric ischemia 2/2 persistent anorexia, nausea, vomiting. Revealed:  Evaluation of the mesenteric vessels is limited by streak artifact from LVAD. There appears to be severe stenosis of the proximal SMA; abdominal mesenteric doppler is recommended for further evaluation. 2.  No small bowel findings to suggest acute mesenteric ischemia. 3.  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    Today: Cultures resulted BC 1/2 +GPC in clusters, SC enterobacter. Remains on vanco, misty. Awaiting mesenteric duplex. Will attempt trophic feeds after duplex.    REVIEW OF SYSTEMS:  Unable to obtain secondary to pt being trached.     ICU Vital Signs Last 24 Hrs  T(C): 36.9 (15 Aug 2021 08:00), Max: 36.9 (14 Aug 2021 16:00)  T(F): 98.4 (15 Aug 2021 08:00), Max: 98.4 (14 Aug 2021 16:00)  HR: 109 (15 Aug 2021 13:27) (98 - 121)  ABP: 117/83 (15 Aug 2021 12:00) (100/75 - 130/98)  ABP(mean): 97 (15 Aug 2021 12:00) (86 - 114)  RR: 23 (15 Aug 2021 12:00) (16 - 39)  SpO2: 100% (15 Aug 2021 13:27) (100% - 100%)    Physical Exam:  Gen:  awake and alert, NAD  CNS:  intact, nonfocal, responds to all commands  Neck: no JVD, +TC   RES : course breath sounds, no wheezing              CVS: +LVAD hum   Abd: Soft, minimally-moderately tender in RUQ by perc dawn site, NT everywhere else, positive BS throughout. Perc dawn drain site c/d/i draining bilious fluid.  Skin: No rash  Ext:  no edema    ============================I/O===========================  I&O's Detail    14 Aug 2021 07:01  -  15 Aug 2021 07:00  --------------------------------------------------------  IN:    dextrose 5% + sodium chloride 0.45%: 1200 mL    Enteral Tube Flush: 100 mL    IV PiggyBack: 812.5 mL    Propofol: 80 mL  Total IN: 2192.5 mL    OUT:    Drain (mL): 115 mL    Voided (mL): 710 mL  Total OUT: 825 mL    Total NET: 1367.5 mL      15 Aug 2021 07:01  -  15 Aug 2021 13:35  --------------------------------------------------------  IN:    dextrose 5% + sodium chloride 0.45%: 250 mL    IV PiggyBack: 250 mL    NiCARdipine: 25 mL  Total IN: 525 mL    OUT:    Propofol: 0 mL    Voided (mL): 220 mL  Total OUT: 220 mL    Total NET: 305 mL      ============================ LABS =========================                     8.9    17.06 )-----------( 233      ( 15 Aug 2021 00:29 )             28.2     134<L>  |  95<L>  |  24<H>  ----------------------------<  160<H>  3.6   |  24  |  0.45<L>    Ca    10.3      15 Aug 2021 00:29  Phos  2.0     08-15  Mg     1.8     08-15    TPro  8.6<H>  /  Alb  3.5  /  TBili  1.2  /  DBili  x   /  AST  39  /  ALT  83<H>  /  AlkPhos  131<H>  08-15    ABG - ( 15 Aug 2021 00:16 )  pH, Arterial: 7.46  pH, Blood: x     /  pCO2: 41    /  pO2: 200   / HCO3: 29    / Base Excess: 4.9   /  SaO2: 100       ======================Micro/Rad/Cardio=================  Culture: Reviewed   CXR: Reviewed  Echo: Reviewed    ======================================================  PAST MEDICAL & SURGICAL HISTORY:  CHF (congestive heart failure)    CAD (coronary artery disease)    Depression    Pleural effusion    History of 2019 novel coronavirus disease (COVID-19)  april 2020    Hemorrhoids    Bleeding hemorrhoids    Peripheral arterial disease    Claudication    BPH with urinary obstruction    ACC/AHA stage D systolic heart failure    Anticoagulation goal of INR 2.0 to 2.5    Falls    Clavicle fracture    CKD (chronic kidney disease), stage III    Iron deficiency anemia    H/O epistaxis    Vertigo    GI bleed    S/P TVR (tricuspid valve repair)    S/P ventricular assist device    S/P endoscopy    ====================ASSESSMENT ==============  Stage D Nonischemic Cardiomyopathy, Status Post HM2 on 9/2017    Cardiogenic shock  Hemodynamic instability   Acute hypoxemic respiratory failure  GI bleed , Status Post Enteroscopy 6/14  Anemia, in setting of melena   Chronic Kidney Disease  Stress hyperglycemia   C.diff positive on 6/20   Hypovolemic shock  Septic shock    Plan:  ====================== NEUROLOGY=====================  Nonfocal, continue to monitor neuro status   Tylenol PRN for analgesia   Clonazepam for anxiety/sleep regimen   C/w mirtazapine for depression    acetaminophen    Suspension .. 650 milliGRAM(s) Oral every 6 hours PRN Mild Pain (1 - 3)  clonazePAM  Tablet 0.25 milliGRAM(s) Oral at bedtime  mirtazapine 7.5 milliGRAM(s) Oral daily    ==================== RESPIRATORY======================  Acute hypoxemic respiratory failure s/p #8 shiley trach on 6/25; patient put back on assist control on 8/13 2/2 worsening of clinical condition. Will attempt CPAP trach collar weaning today.  Requiring intermittent full vent support, continue close monitoring of respiratory rate and breathing pattern, following of ABG’s with A-line monitoring, continuous pulse oximetry monitoring.     ====================CARDIOVASCULAR==================  Stage D Nonischemic Cardiomyopathy, Status Post HM2 on 9/2017; LVAD settings 9200 with flow of 5.9  TTE 7/26: reveals at least moderate MR, mild AI, severe global LV syst dysfxn, dec RV fxn   Continue invasive hemodynamic monitoring   Propranolol for rate control of HR 2/2 Hyperthyroidism, titrate as tolerated    propranolol 20 milliGRAM(s) Oral every 8 hours    ===================HEMATOLOGIC/ONC ===================  Anemia due to acute blood loss, monitor H&H/Plts    Continue monitoring daily LDH   Rheumatology consulted for ?vasculitis which may be contributing to TF intolerance    VTE prophylaxis, heparin   Injectable 5000 Unit(s) SubCutaneous every 8 hours    ===================== RENAL =========================  Continue monitoring urine output, I&OS, BUN/Cr   Replete lytes PRN. Keep K> 4 and Mg >2     ==================== GASTROINTESTINAL===================  CT A/P on 7/28: small ascites; ABD US showing GB wall thickening w/ surrounding pericholecystic fluid  S/p cholecystostomy tube placed on 7/30: with IR with -60cc of black bile, will monitor site closely.   Recent emesis on 8/4 in setting of abdominal pain, as per GI likely component of illeus given critical illness   If worsening abdominal pain or vomiting, recommended CT A/P to r/o toxic megacolon - continue Reglan TID  CTAP on 8/12: no acute intra-abdominal pathology notes   Bowel regimen with Miralax and Senna   Zofran PRN for nausea     CTA A/P 8/13: Evaluation of the mesenteric vessels is limited by streak artifact from LVAD. There appears to be severe stenosis of the proximal SMA; abdominal mesenteric doppler is recommended for further evaluation. 2.  No small bowel findings to suggest acute mesenteric ischemia. 3.  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  Vascular surgery and IR consulted for potential SMA stent. Both teams state that SMA stent is not warranted as they don't believe SMA stenosis is as severe on CTA as read by radiology and do not believe that this stenosis is causing patient's intolerance to TFs.  Awaiting mesenteric duplex.  ?Palliative care f/u this week    GI prophylaxis, pantoprazole  Injectable 40 milliGRAM(s) IV Push every 12 hours  polyethylene glycol 3350 17 Gram(s) Oral daily  senna 2 Tablet(s) Oral at bedtime  ondansetron Injectable 4 milliGRAM(s) IV Push every 6 hours PRN Nausea and/or Vomiting  metoclopramide Injectable 10 milliGRAM(s) IV Push every 8 hours    =======================    ENDOCRINE  =====================  Stress hyperglycemia, continue glucose control with admelog sliding scale   Thyroid US to evaluate for thyroid nodule/goiter in setting of hyperthyroid  - contraindicated at this time 2/2 trach  Methimazole discontinued yesterday for transaminitis    Prednisone for ?thyroiditis as per endocrinology     insulin lispro (ADMELOG) corrective regimen sliding scale   SubCutaneous every 6 hours  methimazole 10 milliGRAM(s) Oral every 8 hours  predniSONE   Tablet 40 milliGRAM(s) Oral daily    ========================INFECTIOUS DISEASE================  Afebrile, WBC within normal limits  Continue trending WBC and monitoring fever curve     I have spent 40 minutes of noncontinuous critical care time with this patient.    Patient requires continuous monitoring with bedside rhythm monitoring, pulse ox monitoring, and intermittent blood gas analysis. Care plan discussed with ICU care team. Patient remained critical and at risk for life threatening decompensation.

## 2021-08-15 NOTE — PROGRESS NOTE ADULT - PROBLEM SELECTOR PLAN 6
- s/p trach 6/25  - had initially been tolerating trach collar but lately vent dependent likely due to deconditioned status

## 2021-08-15 NOTE — PROGRESS NOTE ADULT - PROBLEM SELECTOR PLAN 4
- serratia bacteremia and poss acalculous cholecystitis  - sputum Cx 8/13 with enterobacter   - on IV meropenem and IV Vancomycin  - also on ppx PO Vancomycin   - further guidance by ID

## 2021-08-15 NOTE — PROGRESS NOTE ADULT - ASSESSMENT
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 coli UTIs, driveline drainage (1/7/21) and COVID-19 (back in April 2020)  He was recently seen in clinic where he complained of abdominal pain and dark stools w constipation back in May. He presents to [facility] ER today weakness and fatigue, moderate and + Black stools for three days, on coumadin secondary to warfarin use in the setting of an LVAD. Patient has required transfusions for GIB in the past. Mostly recently back in jan 2021 pt had anemia with dark stools. No interventions was done at that time. However Last Endoscopy was done in July 2020 (negative). Today labs show patient is anemic with H/H of 4.5/16.3,. INR is 8.84 MAP in the 90s,   Returned from endoscopy appearing ill tachypneic with chills with new white out of his left lung    Remains with complaints of abdominal pain  leukocytosis  blood cultures growing gram + cocci- with ID/sensitivity pending  sputum from trach site with many enterobacter and sensitivity pending    restarted on antibiotics with Vancomycin and Meropenem  check Vanco trough prior to fourth dose  follow up on ID and sensitivity of blood culture  follow up on sensitivity of enterobacter      Morris Prater MD  [phone]  After 5pm/weekends [phone]

## 2021-08-15 NOTE — PROGRESS NOTE ADULT - SUBJECTIVE AND OBJECTIVE BOX
RICKY JOINT  MRN#: [id number]  Subjective:  Pulmonary progress  : recurrent Acute hypoxic respiratory Failure ,aspiration pneumonia, NICM  , chart reviewed and H/O obtained radiological and Laboratory study reviewed patient Examined     65M PMH ACC/AHA stage D HF due to NICM HM2 LVAD , TV annuloplasty ring 17 as destination therapy due to severe peripheral artery disease with significant stenosis  SIADH, Depression, CKD-3 with hyperkalemia, past E. coli UTIs, driveline drainage (21) and COVID-19 (back in 2020)  He was recently seen in clinic where he complained of abdominal pain and dark stools w constipation back in May. He presents to [facility] ER today weakness and fatigue, moderate and + Black stools for three days, on coumadin secondary to warfarin use in the setting of an LVAD. Patient has required transfusions for GIB in the past. Mostly recently back in 2021 pt had anemia with dark stools. No interventions was done at that time. However Last Endoscopy was done in 2020 (negative). Today labs show patient is anemic with H/H of 4.5/16.3,. INR is 8.84 MAP in the 90s, Temp 35.1. He denies any chest pain, shortness of breath, dizziness, abd pain, nausea or vomiting. found to have  rectal bleeding underwent endoscopy ,old blood in the proximal ileum ,  develop sepsis with LL opacity given Antibiotics , Extubated , reintubated , Bronchoscopy on Zosyn for LL pneumonia  and Amiodarone S/P TV Annuloplasty , patient remain intubated on full ventilatory support .S/P multiple units of blood transfusion , remain on full ventilatory support on Precedex and propofol , new central IJ line , diarrhea C diff. +ve on po vancomycin and IV Flagyl,  mildly distended belly , fever start on cefepime 2gm q 8 hrs S/P tracheostomy .  new RT Subclavian central line continue on contact  isolation ,still diarrhea on C-diff antibiotics ENT follow up appreciated , trial of C-PAP as tolerated , , copious secretion from trach. site chest x ray left lower lobe pneumonia , tolerating trch. collar 50% FI02 still excessive secretion need pulmonary toilet , off Ancef antibiotic , no more diarrhea back on full support mechanical ventilator , chest x ray show improvement in LLL air space disease, more awake and responsive on tube feeding no more diarrhea , S/P  Ancef for bacteremia no fever ,ID follow up noted ,  no nausea or vomiting or diarrhea still very weak and tired , event note pulled NG tube now replaced , back on tube feeding ,still on po vancomycin , getting PT and OT at bed side , no plan for decannulation for now. , no more diarrhea receiving PT and OPT at bed side , minimal secretion from tracheostomy site , no SOB getting stronger , improve muscle tone patient transfer to monitor bed still on contact isolation for C-Difficel colitis on 50% FI02, NG tube clogged , and change to resume tube feeding still loose stool . H/H drop significantly require blood transfusion , most likely GI bleeding , IV heparin D/C , vomiting 200 cc of creamy color tube feeding on hold no sob, still melena monitor in the CTU possible capsule endoscopy , H/H is stable ., patient develop TR sided  pneumothorax require chest tube placement , RT IJ central line  placed , develop fever shaking chills , blood culture positive for serratia marcescens , start on cefepime .the patient  become hypoxic and hypotensive placed on full ventilatory support and Vasopressin , levophed and Dobutamine ,S/P blood transfusion on meropenem and vancomycin , IR note appreciated   , on and  off pressors , occasional agitation on Precedex .S/P IR cholecystostomy tube drainage placement in the RT upper Quadrant , resume anticoagulation chest x ray noted C-PAP trail lasted only for 2 hrs , new RT SC line and D/C RT IJ line , RT pig tail cathter has been removed , tolerating C-PAP trial placed on trach. collar 50% FI02 GI consultant noted on NG tube feeding as tolerated , develop AF RVR S/P  bolus Amiodarone  back to regular sinus Rhythm , Flat Affect depressed , back on tube feeding Vital AF at 60 cc/ hr .still intermittent abdominal pain , no fever saturation is accepted  back on full ventilatory support , tired and sleepy on round   Dark stool evaluated by Vascular service recommend mesenteric Duplex. new Rt IJ line , C-PAP trial to wean off ventilator        (2021 16:57)    PAST MEDICAL & SURGICAL HISTORY:  CHF (congestive heart failure)    CAD (coronary artery disease)  Depression    Pleural effusion    History of 2019 novel coronavirus disease (COVID-19)  2020    Hemorrhoids    Bleeding hemorrhoids    Peripheral arterial disease    Claudication    BPH with urinary obstruction    ACC/AHA stage D systolic heart failure  Anticoagulation goal of INR 2.0 to 2.5    Falls    Clavicle fracture    CKD (chronic kidney disease), stage III    Iron deficiency anemia    H/O epistaxis    Vertigo    GI bleed    S/P TVR (tricuspid valve repair)    S/P ventricular assist device    S/P endoscopy    OBJECTIVE:  ICU Vital Signs Last 24 Hrs  T(C): 38.2 (2021 10:00), Max: 38.5 (2021 12:00)  T(F): 100.8 (2021 10:00), Max: 101.3 (2021 12:00)  HR: 65 (2021 10:00) (61 - 69)  BP: --  BP(mean): --  ABP: 105/67 (2021 10:00) (90/54 - 113/64)  ABP(mean): 77 (2021 10:00) (63 - 77)  RR: 20 (2021 10:00) (19 - 35)  SpO2: 99% (2021 10:00) (96% - 100%)       @ 07: @ 07:00  --------------------------------------------------------  IN: 2693.9 mL / OUT: 1415 mL / NET: 1278.9 mL     @ 07: @ 10:49  --------------------------------------------------------  IN: 420.8 mL / OUT: 115 mL / NET: 305.8 mL  PHYSICAL EXAM:Daily   Elderly male S/P tracheostomy on full ventilatory support 50% FI02 , C-PAP trial      Daily Weight in k.4 (2021 00:00)  HEENT:     + NCAT  + EOMI  - Conjuctival edema   - Icterus   - Thrush   - ETT  + NGT/OGT  Neck:         + FROM  RT IJ  line JVD  - Nodes - Masses + Mid-line trachea + Tracheostomy  Chest:            normal A-P diameter    Lungs:          + CTA   + Rhonchi    - Rales    - Wheezing + Decreased  LT BS   - Dullness R L  Cardiac:       + S1 + S2    + RRR   - Irregular   - S3  - S4    - Murmurs   - Rub   - Hamman’s sign   Abdomen:    + BS  + Soft + Non-tender - Distended - Organomegaly - PEG .cholecystostomy tube in place  Extremities:   - Cyanosis U/L   - Clubbing  U/L  + LE/UE Edema   + Capillary refill    + Pulses   Neuro:        - Awake   -  Alert   - Confused   - Lethargic   + Sedated  + Generalized Weakness  Skin:        - Rashes    - Erythema   + Normal incisions   + IV sites intact          HOSPITAL MEDICATIONS: All medications reviewed and analyzed  MEDICATIONS  (STANDING):  amiodarone    Tablet 200 milliGRAM(s) Oral daily  chlorhexidine 0.12% Liquid 15 milliLiter(s) Oral Mucosa every 12 hours  chlorhexidine 2% Cloths 1 Application(s) Topical <User Schedule>  dexmedetomidine Infusion 0.5 MICROgram(s)/kG/Hr (9.81 mL/Hr) IV Continuous <Continuous>  dextrose 50% Injectable 50 milliLiter(s) IV Push every 15 minutes  heparin  Infusion 400 Unit(s)/Hr (12.5 mL/Hr) IV Continuous <Continuous>  Hydromorphone  Injectable 0.5 milliGRAM(s) IV Push once  insulin lispro (ADMELOG) corrective regimen sliding scale   SubCutaneous every 6 hours  pantoprazole  Injectable 40 milliGRAM(s) IV Push every 12 hours  piperacillin/tazobactam IVPB.. 3.375 Gram(s) IV Intermittent every 8 hours  propofol Infusion 20 MICROgram(s)/kG/Min (9.42 mL/Hr) IV Continuous <Continuous>  sodium chloride 0.9% lock flush 3 milliLiter(s) IV Push every 8 hours  sodium chloride 0.9%. 1000 milliLiter(s) (10 mL/Hr) IV Continuous <Continuous>    MEDICATIONS  (PRN):  acetaminophen    Suspension .. 650 milliGRAM(s) Oral every 6 hours PRN Temp greater or equal to 38C (100.4F)    LABS: All Lab data reviewed and analyzed                                  8.9    17.06 )-----------( 233      ( 15 Aug 2021 00:29 )             28.2   08-15    134<L>  |  95<L>  |  24<H>  ----------------------------<  160<H>  3.6   |  24  |  0.45<L>    Ca    10.3      15 Aug 2021 00:29  Phos  2.0     08-15  Mg     1.8     08-15    TPro  8.6<H>  /  Alb  3.5  /  TBili  1.2  /  DBili  x   /  AST  39  /  ALT  83<H>  /  AlkPhos  131<H>  08-15    Ca    10.7<H>      14 Aug 2021 00:53  Phos  2.8     08-14  Mg     1.7     -    TPro  9.1<H>  /  Alb  3.9  /  TBili  1.3<H>  /  DBili  x   /  AST  118<H>  /  ALT  126<H>  /  AlkPhos  148<H>      Ca    10.0      12 Aug 2021 00:22  Phos  4.4     08-12  Mg     1.8     -    TPro  8.4<H>  /  Alb  3.8  /  TBili  0.5  /  DBili  x   /  AST  43<H>  /  ALT  43  /  AlkPhos  137<H>      Ca    9.6      11 Aug 2021 00:29  Phos  3.6     08-11  Mg     1.7     -    TPro  8.4<H>  /  Alb  3.7  /  TBili  0.5  /  DBili  x   /  AST  29  /  ALT  32  /  AlkPhos  145<H>      Ca    9.7      10 Aug 2021 00:51  Phos  3.5     08-10  Mg     1.7     -10    TPro  7.8  /  Alb  3.5  /  TBili  0.6  /  DBili  x   /  AST  33  /  ALT  30  /  AlkPhos  140<H>  08-10      Ca    9.1      09 Aug 2021 00:33  Phos  3.3     08-09  Mg     1.5     -    TPro  7.7  /  Alb  3.6  /  TBili  0.6  /  DBili  x   /  AST  25  /  ALT  24  /  AlkPhos  137<H>                                                                                     PTT - ( 2021 04:52 )  PTT:45.2 sec LIVER FUNCTIONS - ( 2021 00:42 )  Alb: 3.4 g/dL / Pro: 6.7 g/dL / ALK PHOS: 213 U/L / ALT: 15 U/L / AST: 24 U/L / GGT: x           RADIOLOGY: - Reviewed and analyzed RT Pig tail cathter  , LVAD HM2, CT scan of abdomen reviewed result noted

## 2021-08-15 NOTE — PROCEDURE NOTE - NSANTIMICROB_VASC_A_CORE
Discharge- Edel [facility] 1953, 73 y o  male MRN: [id number]    Unit/Bed#: [facility] 728-01 Encounter: [id number]    Primary Care Provider: Lianet Benjamin MD   Date and time admitted to hospital: 6/29/2020  8:44 AM        * Chest pain  Assessment & Plan  · Patient came to the hospital with intermittent chest pain  History is suggestive of atypical chest pain  · No relationship with activity  · Patient appears to be very anxious and seems to be worked up regarding the pain  · Had stress test in 2007  · WILFREDO score 3  · Troponin x3  · Followed by Duane Peter as outpatient    Tick bite  Assessment & Plan  · Patient reported that he had tick bite 4 weeks ago and he is worried that he has Lyme disease and some of the symptoms may be a manifestation of his Lyme disease  · No fever or any joint pain  · Obtain a Lyme titer-patient aware that the results is not going to be back by the time he is discharged and needs outpatient follow-up with the PCP    Chronic kidney disease  Assessment & Plan  · Creatinine 1 41  · Appears to be at baseline    History of pacemaker  Assessment & Plan  · Patient had pacemaker placement in 2017 for heart block  · Pacer interrogated in 05/2020-    Hypertension  Assessment & Plan  · Patient is on Norvasc ,metoprolol and losartan as an outpatient  · Continue with the outpatient blood pressure medication  · Monitor blood pressure  · Appears to be at acceptable range                Resolved Problems  Date Reviewed: 6/29/2020    None          Admission Date:   Admission Orders (From admission, onward)     Ordered        06/29/20 1125  Place in Observation  Once                     Admitting Diagnosis: Chest pain [R07 9]  SOB (shortness of breath) [R06 02]    HPI:  This is a very pleasant 43-year-old male presents hospital intermittent chest pain  The patient presents hospital further workup evaluation  Patient reports a does not exercise on a regular basis and is 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  He presents to the hospital for further evaluation of his chest discomfort  His D-dimer was negative on initial presentation  Of note, he also reports possible tick bite exposure with concern for possible Lyme as well  He denies any recent rash  · Chest pain-patient with no further symptoms  Cardiac enzymes negative  Will follow-up PCP as outpatient for follow-up  · Rule out Lyme-testing ordered  Will have patient follow-up with PCP regarding final results  · History pacemaker  · Hypertension-continue with Norvasc, metoprolol and losartan  Procedures Performed:   Orders Placed This Encounter   Procedures    ED ECG Documentation Only           Condition at Discharge: fair         Discharge instructions/Information to patient and family:   See after visit summary for information provided to patient and family  Provisions for Follow-Up Care:  See after visit summary for information related to follow-up care and any pertinent home health orders  PCP: Kelton Coats MD    Disposition: Home    Planned Readmission: No    Discharge Statement   I spent 35 minutes discharging the patient  This time was spent on the day of discharge  I had direct contact with the patient on the day of discharge  Additional documentation is required if more than 30 minutes were spent on discharge  Discharge Medications:  See after visit summary for reconciled discharge medications provided to patient and family  Yes

## 2021-08-15 NOTE — CONSULT NOTE ADULT - ASSESSMENT
A/P  65M PMH ACC/AHA stage D HF due to NICM HM2 LVAD , TV annuloplasty ring 9/12/17 as destination therapy due to severe peripheral artery disease with significant stenosis  SIADH, Depression, CKD-3 with hyperkalemia, past E. coli UTIs, driveline drainage (1/7/21) and COVID-19, here initially for GIB prolonged hospital course, s/p tracheostomy, acalculous cholecystitis s/p perc dawn. Patient has persistent intermittent abdominal pain.  Per CTU team, pt has been refusing tube feeds and is being evaluated for chronic mesenteric ischemia vs SMA syndrome.  Consulted for TPN.   A/P  65M PMH ACC/AHA stage D HF due to NICM HM2 LVAD , TV annuloplasty ring 9/12/17 as destination therapy due to severe peripheral artery disease with significant stenosis  SIADH, Depression, CKD-3 with hyperkalemia, past E. coli UTIs, driveline drainage (1/7/21) and COVID-19, here initially for GIB prolonged hospital course, s/p tracheostomy, acalculous cholecystitis s/p perc dawn. Patient has persistent intermittent abdominal pain.  Per CTU team, pt has recently been refusing tube feeds and is being evaluated for chronic mesenteric ischemia vs SMA syndrome.  Consulted for TPN.    - f/u mesenteric duplex, await results to help determine if TF's can be restarted  - anemia - Fe supplementation as indicated  - Please obtain nutrition parameters:  pre-albumin, HbA1c, lipid panel  - blood cx's + from 8/13, continue Abx per ID  - hypophosphatemia - replete prn, pt at risk for refeeding syndrome  - hypomagnesemia - replete prn  - care per CTU, will follow    TPN pager [phone], spectra [phone]  M-F 6A-4P, Weekends and holidays 8A-12P  discussed with Dr. Soliz and Dr. IVAN Tang    A/P  65M PMH ACC/AHA stage D HF due to NICM HM2 LVAD , TV annuloplasty ring 9/12/17 as destination therapy due to severe peripheral artery disease with significant stenosis  SIADH, Depression, CKD-3 with hyperkalemia, past E. coli UTIs, driveline drainage (1/7/21) and COVID-19, here initially for GIB prolonged hospital course, s/p tracheostomy, acalculous cholecystitis s/p perc dawn. Patient has persistent intermittent abdominal pain.  Per CTU team, pt has recently been refusing tube feeds and is being evaluated for chronic mesenteric ischemia vs SMA syndrome.  Consulted for TPN.    - f/u mesenteric duplex, await results to help determine if TF's can be restarted  - anemia - Fe supplementation when feasible (hold in the setting of infection)  - Please obtain nutrition parameters:  pre-albumin, HbA1c, lipid panel  - blood cx's + from 8/13, continue Abx per ID  - hypophosphatemia - replete prn, pt at risk for refeeding syndrome  - hypomagnesemia - replete prn  - care per CTU, will follow    TPN pager [phone], spectra [phone]  M-F 6A-4P, Weekends and holidays 8A-12P  discussed with Dr. Soliz and Dr. IVAN Tang

## 2021-08-15 NOTE — PROGRESS NOTE ADULT - PROBLEM SELECTOR PLAN 3
- unlikely related to low cardiac output  - GI input, RUQ sono 8/6 showed decompressed gallbladder and no focal fluid collection  - CT ABD 8/13 limited evaluation but concern severe stenosis of proximal SMA for which vascular Sx is following  - would inquire with ID if his presentation may be related to severe thrush leading to esophagitis

## 2021-08-16 NOTE — PROGRESS NOTE ADULT - ASSESSMENT
Seen sitting in chair in CTU with Trach collar. Speaking limited due to trach but able to communicate sufficiently by speaking over trach and mouthing words, using hand gestures, and nodding yes/no. Maintained good eye contact and was engaged in the assessment. Endorsed periods of low mood, frustration, and irritability in setting of extended hospitalization and ongoing abdominal discomfort. Sleep poor in hospital. Takes Klonapin at bedtime. Denies anhedonia. Denies SI/HI. Discussed strategies for improving mood and motivation. Identified several strategies including trip outside for change of scenery, meeting with the Holistic RN to work on relaxation exercises, and continue working with PT for building strength and stamina. Agreeable to this plan. Reports his niece, Celestina visited this morning for a few minutes. Receptive to support, validation, and feedback. Later in day met with patient along with other members of the team (RN, SW, Holistic RN) to further discuss plan for going outside tomorrow. HF team in agreement with plan.     Dx: Adjustment disorder with anxiety and depressed mood. F43.20 Systolic heart failure I50.2  LVAD Z95.811    Recommendations:   To promote sleep, keep lights and noise level to minimum during night  Engage with patient as often as possible throughout day to encourage communication  Behavioral Cardiology will follow              53 minutes spent on total patient encounter    Jessica Hennessy, PhD  [phone]

## 2021-08-16 NOTE — PROGRESS NOTE ADULT - ASSESSMENT
Assessment  Hyperthyroidism: 66y Male with no history thyroid disease, was not on any thyroid supplements, previously euthyroid (June 2021), was on amiodarone (last dose 8/7), now in hyperthyroid state, Hashimotos, started  on Methimazole 10mg TID and propanolol for tachycardia, LFTs are trending further up, Methimazole DCed, thyroid US pending, started on Prednisone.  Hyperglycemia: Patient in nondiabetic range, A1C 5.6%, now on insulin coverage, blood sugars in acceptable range, no hypoglycemias, on TFs.  CHF: on medications, stable, monitored.  Anemia: Monitored      Pavan Barone MD  Cell: [phone]  Office: [phone]

## 2021-08-16 NOTE — PROGRESS NOTE ADULT - ASSESSMENT
Assessment and Recommendation:   · Assessment	  Assessment and recommendation :  Recurrent Acute hypoxic respiratory Failure S/P tracheostomy on trach. collar  50% FI02,   Acute blood loss anemia S/P multiple  blood transfusion   S/P septic shock off vasopressin , levophed and off  Dobutamine   S/P sepsis with serratia marcescens in the blood S/P  cefepime  S/P cholecystostomy tube placement by IR    AF RVR back to regular sinus Rhythm   Non ischemic cardiomyopathy continue ACE inhibitor and B-Blockers   S/P Septic shock and cardiogenic shock   Stage D systolic heart failure S/P LVAD HM2   MH2 LVAD  with  TV Annuloplasty  Severe peripheral vascular disease   severe hyperglycemia on insulin coverage    critical care polyneuropathy   Anemia of Acute blood Loss   severe protein caloric malnutrition   S/P Small bowel bleeding   S/P blood and FFP transfusion   Chronic kidney disease stage III   NGT feeding Vital at 60 cc/ hr, trial of bolus feeding   GI prophylaxis with PPI

## 2021-08-16 NOTE — PROGRESS NOTE ADULT - SUBJECTIVE AND OBJECTIVE BOX
Subjective:    Medications:  acetaminophen    Suspension .. 650 milliGRAM(s) Oral every 6 hours PRN  cefepime   IVPB      cefepime   IVPB 1000 milliGRAM(s) IV Intermittent every 8 hours  chlorhexidine 0.12% Liquid 15 milliLiter(s) Oral Mucosa every 12 hours  clonazePAM  Tablet 0.25 milliGRAM(s) Oral at bedtime  dextrose 5% + sodium chloride 0.45%. 1000 milliLiter(s) IV Continuous <Continuous>  heparin   Injectable 5000 Unit(s) SubCutaneous every 8 hours  metoclopramide Injectable 10 milliGRAM(s) IV Push every 8 hours  mirtazapine 7.5 milliGRAM(s) Oral daily  niCARdipine Infusion 3 mG/Hr IV Continuous <Continuous>  ondansetron Injectable 4 milliGRAM(s) IV Push every 6 hours PRN  pantoprazole  Injectable 40 milliGRAM(s) IV Push every 12 hours  propranolol 20 milliGRAM(s) Oral every 8 hours  vancomycin    Solution 125 milliGRAM(s) Oral every 6 hours  vancomycin  IVPB 1000 milliGRAM(s) IV Intermittent every 12 hours      Vitals:  Vital Signs Last 24 Hrs  T(C): 36.1 (16 Aug 2021 04:00), Max: 36.7 (15 Aug 2021 16:00)  T(F): 96.9 (16 Aug 2021 04:00), Max: 98 (15 Aug 2021 16:00)  HR: 104 (16 Aug 2021 07:00) (91 - 121)  BP(mean):   RR: 30 (16 Aug 2021 07:00) (16 - 39)  SpO2: 100% (16 Aug 2021 07:00) (100% - 100%)    Daily     Daily Weight in k.9 (16 Aug 2021 00:00)      I&O's Summary    15 Aug 2021 07:01  -  16 Aug 2021 07:00  --------------------------------------------------------  IN: 2290 mL / OUT: 1145 mL / NET: 1145 mL        Physical Exam:     General: No distress. Comfortable.  Neck: Neck supple. JVP not elevated. No masses  Chest: Clear to auscultation bilaterally  CV: Typical LVAD sounds. No palpable pulses  Abdomen: Soft, non-distended, non-tender  Extremities: no LE edema, warm and well perfused  Skin: No rashes or skin breakdown  Neurology: Alert and oriented times three. Sensation intact  Psych: Affect normal    LVAD Interrogation: Heart Mate 3  Speed: 9200  Flow: 5.3  Power: 5.6  PI: 7.0  Event:  No programming changes were made    Labs:                        8.7    16. )-----------( 233      ( 16 Aug 2021 01:09 )             27.9     -    132<L>  |  97  |  17  ----------------------------<  151<H>  3.8   |  24  |  0.47<L>    Ca    9.7      16 Aug 2021 01:09  Phos  1.7       Mg     1.7         TPro  8.3  /  Alb  3.4  /  TBili  0.7  /  DBili  x   /  AST  19  /  ALT  55<H>  /  AlkPhos  167<H>                Lactate Dehydrogenase, Serum: 190 U/L ( @ 01:09)  Lactate Dehydrogenase, Serum: 184 U/L (08-15 @ 02:47)  Lactate Dehydrogenase, Serum: 192 U/L (08-15 @ 00:29)  Lactate Dehydrogenase, Serum: 268 U/L ( @ 00:53)      Total Cholesterol: 153  LDL: --  HDL: 34  T

## 2021-08-16 NOTE — CHART NOTE - NSCHARTNOTEFT_GEN_A_CORE
CTA and mesenteric duplex reviewed - minimal SMA stenosis seen on mesenteric duplex; pt's symptoms are not consistent with chronic mesenteric ischemia and would not be a candidate for any intervention at this time.    Recommend:  - ASA and Statin daily  - Further workup/management per primary team  - Reconsult as needed    Discussed with vascular fellow.    Vascular Surgery  [id number]

## 2021-08-16 NOTE — PROGRESS NOTE ADULT - SUBJECTIVE AND OBJECTIVE BOX
Behavioral Cardiology Progress Note     History of present illness: Mr. Quispe is a 66 year-old man with history of NICM s/p HM2 on 9/2017 as DT (due to severe PAD) with TV ring, prior COVID-19 infection 4/2020, recurrent syncope post LVAD s/p ILR, and chronic abdominal pain with prior negative workup who was admitted with symptomatic anemia with Hgb 4.5 in setting of INR 8.8 without hemodynamic instability. Testing showed active bleeding in the small bowel but subsequent enteroscopy 6/15 did not reveal any active bleeding. He acutely decompensated after procedure with fever/hypertension, low flow alarms, and pulmonary infiltrate with hypoxia requiring intubation from probable aspiration PNA.  Course notable for inability to wean ventilator with persistent secretions for which he underwent tracheostomy as well as acute c diff colitis.     Social history: , lives in his sister’s house in [place]. Has 3 sons (2 live in , 1 in [facility]). One of 3 siblings. Lives in his sister’s house in [place] (Cristina Rudolph [phone]), also in the home are niece (Celest[facility] [phone]) and nephew (Miller Rudolph).  Another sister lives close by in [place] and all other siblings live in [place] [place] which the family visit often.  Worked full time at [employer] in [place], stopped working due to heart disease. Education: high school graduate.     Substance use:   Tobacco: Former smoker, 8-10 cigarettes/day for 30 years.   Alcohol: Past use of 3-4 drinks of Henessey 2x/week. None for past 4 years.   Drug: Denies any drug use.     Past psychiatric history: Denies     Mental status exam: Seen resting in bed on CTU with Trach collar. Awake, oriented x2-3 (thought it was later in month). Thought process goal oriented. No evidence of any psychosis, kenan, delusions. No SI/HI. Mood dysphoric. Affect frustrated. Denies SI/HI. Insight and judgment adequate.

## 2021-08-16 NOTE — PROGRESS NOTE ADULT - ASSESSMENT
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 coli UTIs, driveline drainage (1/7/21) and COVID-19 (back in April 2020)  He was recently seen in clinic where he complained of abdominal pain and dark stools w constipation back in May. He presents to [facility] ER today weakness and fatigue, moderate and + Black stools for three days, on coumadin secondary to warfarin use in the setting of an LVAD. Patient has required transfusions for GIB in the past. Mostly recently back in jan 2021 pt had anemia with dark stools. No interventions was done at that time. However Last Endoscopy was done in July 2020 (negative). Today labs show patient is anemic with H/H of 4.5/16.3,. INR is 8.84 MAP in the 90s,   Returned from endoscopy appearing ill tachypneic with chills with new white out of his left lung    Remains with complaints of abdominal pain  leukocytosis  blood cultures growing gram + cocci- with Staph epidermidis in a single set  sputum from [facility] site with many enterobacter Carbapenem resistant strain  Will plan to treat with Cefepime x 7days  Staph epi in a single blood culture may be a contaminant-   would check Vanco trough level  Will consider discontinuing the gram + coverage if remaining blood cultures are negative      Morris Prater MD  [phone]  After 5pm/weekends [phone]

## 2021-08-16 NOTE — PROGRESS NOTE ADULT - SUBJECTIVE AND OBJECTIVE BOX
Chief Complaint:  Patient is a 66y old  Male who presents with a chief complaint of Anemia, Supratherapeutic INR, Dark Stools (16 Aug 2021 08:51)      Interval Events:       Hospital Medications:  acetaminophen    Suspension .. 650 milliGRAM(s) Oral every 6 hours PRN  cefepime   IVPB      cefepime   IVPB 1000 milliGRAM(s) IV Intermittent every 8 hours  chlorhexidine 0.12% Liquid 15 milliLiter(s) Oral Mucosa every 12 hours  chlorhexidine 2% Cloths 1 Application(s) Topical <User Schedule>  clonazePAM  Tablet 0.25 milliGRAM(s) Oral at bedtime  dextrose 5% + sodium chloride 0.45%. 1000 milliLiter(s) IV Continuous <Continuous>  heparin   Injectable 5000 Unit(s) SubCutaneous every 8 hours  metoclopramide Injectable 10 milliGRAM(s) IV Push every 8 hours  mirtazapine 7.5 milliGRAM(s) Oral daily  niCARdipine Infusion 3 mG/Hr IV Continuous <Continuous>  ondansetron Injectable 4 milliGRAM(s) IV Push every 6 hours PRN  pantoprazole  Injectable 40 milliGRAM(s) IV Push every 12 hours  propranolol 20 milliGRAM(s) Oral every 8 hours  vancomycin    Solution 125 milliGRAM(s) Oral every 6 hours  vancomycin  IVPB 1000 milliGRAM(s) IV Intermittent every 12 hours      PMHX/PSHX:  No pertinent past medical history    CHF (congestive heart failure)    CAD (coronary artery disease)    Depression    Pleural effusion    History of 2019 novel coronavirus disease (COVID-19)    Hemorrhoids    Bleeding hemorrhoids    Peripheral arterial disease    Claudication    BPH with urinary obstruction    ACC/AHA stage D systolic heart failure    Anticoagulation goal of INR 2.0 to 2.5    Falls    Clavicle fracture    CKD (chronic kidney disease), stage III    Iron deficiency anemia    H/O epistaxis    Vertigo    GI bleed    No significant past surgical history    S/P TVR (tricuspid valve repair)    S/P ventricular assist device    S/P endoscopy            ROS:     General:  No weight loss, fevers, chills, night sweats, fatigue   Eyes:  No vision changes  ENT:  No sore throat, pain, runny nose  CV:  No chest pain, palpitations, dizziness   Resp:  No SOB, cough, wheezing  GI:  See HPI  :  No burning with urination, hematuria  Muscle:  No pain, weakness  Neuro:  No weakness/tingling, memory problems  Psych:  No fatigue, insomnia, mood problems, depression  Heme:  No easy bruisability  Skin:  No rash, edema      PHYSICAL EXAM:     GENERAL:  Well developed, no distress  HEENT:  NC/AT,  conjunctivae clear, sclera anicteric  CHEST:  Full & symmetric excursion, no increased effort w/ respirations  HEART:  Regular rhythm & rate  ABDOMEN:  Soft, non-tender, non-distended  EXTREMITIES:  no LE  edema  SKIN:  No rash/erythema/ecchymoses/petechiae/wounds/jaundice  NEURO:  Alert, oriented    Vital Signs:  Vital Signs Last 24 Hrs  T(C): 36.1 (16 Aug 2021 04:00), Max: 36.7 (15 Aug 2021 16:00)  T(F): 96.9 (16 Aug 2021 04:00), Max: 98 (15 Aug 2021 16:00)  HR: 102 (16 Aug 2021 10:00) (91 - 121)  BP: --  BP(mean): --  RR: 24 (16 Aug 2021 10:00) (16 - 39)  SpO2: 100% (16 Aug 2021 10:00) (100% - 100%)  Daily     Daily Weight in k.9 (16 Aug 2021 00:00)    LABS:                        8.7    16.97 )-----------( 233      ( 16 Aug 2021 01:09 )             27.9     08-16    132<L>  |  97  |  17  ----------------------------<  151<H>  3.8   |  24  |  0.47<L>    Ca    9.7      16 Aug 2021 01:09  Phos  1.7     08-16  Mg     1.7     08-16    TPro  8.3  /  Alb  3.4  /  TBili  0.7  /  DBili  x   /  AST  19  /  ALT  55<H>  /  AlkPhos  167<H>  08-16    LIVER FUNCTIONS - ( 16 Aug 2021 01:09 )  Alb: 3.4 g/dL / Pro: 8.3 g/dL / ALK PHOS: 167 U/L / ALT: 55 U/L / AST: 19 U/L / GGT: x             Urinalysis Basic - ( 14 Aug 2021 12:47 )    Color: Yellow / Appearance: Clear / S.041 / pH: x  Gluc: x / Ketone: Small  / Bili: Small / Urobili: Negative   Blood: x / Protein: 100 / Nitrite: Negative   Leuk Esterase: Negative / RBC: x / WBC x   Sq Epi: x / Non Sq Epi: x / Bacteria: x          Imaging:  < from: US Abdomen Upper Quadrant Right (08.15.21 @ 13:17) >  FINDINGS:    Liver: Within normal limits.  Bile ducts: Normal caliber. Common bile duct measures 1.7 mm.  Gallbladder: Status post percutaneous cholecystostomy. Nocollection seen.  Pancreas: Limited visualization.  Right kidney: 10.5 cm. No hydronephrosis.  Ascites: None.  IVC: Visualized portions are within normal limits.    The abdominal aorta is normal in size without evidence of significant plaque or aneurysm. The peak systolic velocity in the abdominal aorta is 76 cm/s.    The celiac and superior mesenteric arteries are well visualized.  The peak systolic velocities in the celiac artery are as follows: Proximal 134, mid 134 and distal 140 cm/s.  The peak systolic velocities in the superior mesenteric artery are as follows: Proximal 266, mid 144 and distal 93 cm/s.    The inferior mesenteric artery was not visualized.    IMPRESSION:    Status post percutaneous cholecystostomy. No evidence of bile duct dilatation, collection or liver mass.    Doppler evaluation of the mesenteric arteries demonstrates borderline stenosis of the proximal superior mesenteric artery. No significant stenosis is seen in the celiac artery. The inferior mesenteric artery was not visualized.    < end of copied text >             Chief Complaint:  Patient is a 66y old  Male who presents with a chief complaint of Anemia, Supratherapeutic INR, Dark Stools (16 Aug 2021 08:51)      Interval Events: patient agitated today - discussed with bedside RN  - patient refusing to ambulate and refusing TF  - 10min into start TF at 10cc/hr patient starting spitting up/wretching clear secretions; not tube feeds      Hospital Medications:  acetaminophen    Suspension .. 650 milliGRAM(s) Oral every 6 hours PRN  cefepime   IVPB      cefepime   IVPB 1000 milliGRAM(s) IV Intermittent every 8 hours  chlorhexidine 0.12% Liquid 15 milliLiter(s) Oral Mucosa every 12 hours  chlorhexidine 2% Cloths 1 Application(s) Topical <User Schedule>  clonazePAM  Tablet 0.25 milliGRAM(s) Oral at bedtime  dextrose 5% + sodium chloride 0.45%. 1000 milliLiter(s) IV Continuous <Continuous>  heparin   Injectable 5000 Unit(s) SubCutaneous every 8 hours  metoclopramide Injectable 10 milliGRAM(s) IV Push every 8 hours  mirtazapine 7.5 milliGRAM(s) Oral daily  niCARdipine Infusion 3 mG/Hr IV Continuous <Continuous>  ondansetron Injectable 4 milliGRAM(s) IV Push every 6 hours PRN  pantoprazole  Injectable 40 milliGRAM(s) IV Push every 12 hours  propranolol 20 milliGRAM(s) Oral every 8 hours  vancomycin    Solution 125 milliGRAM(s) Oral every 6 hours  vancomycin  IVPB 1000 milliGRAM(s) IV Intermittent every 12 hours      PMHX/PSHX:  No pertinent past medical history    CHF (congestive heart failure)    CAD (coronary artery disease)    Depression    Pleural effusion    History of 2019 novel coronavirus disease (COVID-19)    Hemorrhoids    Bleeding hemorrhoids    Peripheral arterial disease    Claudication    BPH with urinary obstruction    ACC/AHA stage D systolic heart failure    Anticoagulation goal of INR 2.0 to 2.5    Falls    Clavicle fracture    CKD (chronic kidney disease), stage III    Iron deficiency anemia    H/O epistaxis    Vertigo    GI bleed    No significant past surgical history    S/P TVR (tricuspid valve repair)    S/P ventricular assist device    S/P endoscopy            ROS:     General:  No weight loss, fevers, chills, night sweats, fatigue   Eyes:  No vision changes  ENT:  No sore throat, pain, runny nose  CV:  No chest pain, palpitations, dizziness   Resp:  No SOB, cough, wheezing  GI:  See HPI  :  No burning with urination, hematuria  Muscle:  No pain, weakness  Neuro:  No weakness/tingling, memory problems  Psych:  No fatigue, insomnia, mood problems, depression  Heme:  No easy bruisability  Skin:  No rash, edema      PHYSICAL EXAM:     GENERAL:  chronically ill appearing, no distress  HEENT:  NC/AT,  conjunctivae clear, NGT In place  CHEST:  Full & symmetric excursion, no increased effort w/ respirations  HEART:  Regular rhythm & rate  ABDOMEN:  Soft, non-distended, nontender to diffuse palpation, perc tube inp lace  EXTREMITIES:  no LE  edema  SKIN:  No rash/erythema  NEURO:  Alert, oriented    Vital Signs:  Vital Signs Last 24 Hrs  T(C): 36.1 (16 Aug 2021 04:00), Max: 36.7 (15 Aug 2021 16:00)  T(F): 96.9 (16 Aug 2021 04:00), Max: 98 (15 Aug 2021 16:00)  HR: 102 (16 Aug 2021 10:00) (91 - 121)  BP: --  BP(mean): --  RR: 24 (16 Aug 2021 10:00) (16 - 39)  SpO2: 100% (16 Aug 2021 10:00) (100% - 100%)  Daily     Daily Weight in k.9 (16 Aug 2021 00:00)    LABS:                        8.7    16.97 )-----------( 233      ( 16 Aug 2021 01:09 )             27.9     08-16    132<L>  |  97  |  17  ----------------------------<  151<H>  3.8   |  24  |  0.47<L>    Ca    9.7      16 Aug 2021 01:09  Phos  1.7     08-  Mg     1.7     -    TPro  8.3  /  Alb  3.4  /  TBili  0.7  /  DBili  x   /  AST  19  /  ALT  55<H>  /  AlkPhos  167<H>  08-    LIVER FUNCTIONS - ( 16 Aug 2021 01:09 )  Alb: 3.4 g/dL / Pro: 8.3 g/dL / ALK PHOS: 167 U/L / ALT: 55 U/L / AST: 19 U/L / GGT: x             Urinalysis Basic - ( 14 Aug 2021 12:47 )    Color: Yellow / Appearance: Clear / S.041 / pH: x  Gluc: x / Ketone: Small  / Bili: Small / Urobili: Negative   Blood: x / Protein: 100 / Nitrite: Negative   Leuk Esterase: Negative / RBC: x / WBC x   Sq Epi: x / Non Sq Epi: x / Bacteria: x          Imaging:  < from: US Abdomen Upper Quadrant Right (08.15.21 @ 13:17) >  FINDINGS:    Liver: Within normal limits.  Bile ducts: Normal caliber. Common bile duct measures 1.7 mm.  Gallbladder: Status post percutaneous cholecystostomy. Nocollection seen.  Pancreas: Limited visualization.  Right kidney: 10.5 cm. No hydronephrosis.  Ascites: None.  IVC: Visualized portions are within normal limits.    The abdominal aorta is normal in size without evidence of significant plaque or aneurysm. The peak systolic velocity in the abdominal aorta is 76 cm/s.    The celiac and superior mesenteric arteries are well visualized.  The peak systolic velocities in the celiac artery are as follows: Proximal 134, mid 134 and distal 140 cm/s.  The peak systolic velocities in the superior mesenteric artery are as follows: Proximal 266, mid 144 and distal 93 cm/s.    The inferior mesenteric artery was not visualized.    IMPRESSION:    Status post percutaneous cholecystostomy. No evidence of bile duct dilatation, collection or liver mass.    Doppler evaluation of the mesenteric arteries demonstrates borderline stenosis of the proximal superior mesenteric artery. No significant stenosis is seen in the celiac artery. The inferior mesenteric artery was not visualized.    < end of copied text >

## 2021-08-16 NOTE — PROGRESS NOTE ADULT - SUBJECTIVE AND OBJECTIVE BOX
Chief complaint    Patient is a 66y old  Male who presents with a chief complaint of Anemia, Supratherapeutic INR, Dark Stools (16 Aug 2021 10:07)   Review of systems  Patient in bed, appears comfortable.    Labs and Fingersticks  CAPILLARY BLOOD GLUCOSE      POCT Blood Glucose.: 187 mg/dL (15 Aug 2021 13:06)      Anion Gap, Serum: 11 (08-16 @ 01:09)  Anion Gap, Serum: 15 (08-15 @ 00:29)      Calcium, Total Serum: 9.7 (08-16 @ 01:09)  Calcium, Total Serum: 10.3 (08-15 @ 00:29)  Albumin, Serum: 3.4 (08-16 @ 01:09)  Albumin, Serum: 3.5 (08-15 @ 00:29)    Alanine Aminotransferase (ALT/SGPT): 55 *H* (08-16 @ 01:09)  Alanine Aminotransferase (ALT/SGPT): 83 *H* (08-15 @ 00:29)  Alkaline Phosphatase, Serum: 167 *H* (08-16 @ 01:09)  Alkaline Phosphatase, Serum: 131 *H* (08-15 @ 00:29)  Aspartate Aminotransferase (AST/SGOT): 19 (08-16 @ 01:09)  Aspartate Aminotransferase (AST/SGOT): 39 (08-15 @ 00:29)        08-16    132<L>  |  97  |  17  ----------------------------<  151<H>  3.8   |  24  |  0.47<L>    Ca    9.7      16 Aug 2021 01:09  Phos  1.7     08-16  Mg     1.7     08-16    TPro  8.3  /  Alb  3.4  /  TBili  0.7  /  DBili  x   /  AST  19  /  ALT  55<H>  /  AlkPhos  167<H>  08-16                        8.7    16.97 )-----------( 233      ( 16 Aug 2021 01:09 )             27.9     Medications  MEDICATIONS  (STANDING):  cefepime   IVPB      cefepime   IVPB 1000 milliGRAM(s) IV Intermittent every 8 hours  chlorhexidine 0.12% Liquid 15 milliLiter(s) Oral Mucosa every 12 hours  chlorhexidine 2% Cloths 1 Application(s) Topical <User Schedule>  clonazePAM  Tablet 0.25 milliGRAM(s) Oral at bedtime  dextrose 5% + sodium chloride 0.45%. 1000 milliLiter(s) (50 mL/Hr) IV Continuous <Continuous>  heparin   Injectable 5000 Unit(s) SubCutaneous every 8 hours  metoclopramide Injectable 10 milliGRAM(s) IV Push every 8 hours  mirtazapine 7.5 milliGRAM(s) Oral daily  niCARdipine Infusion 3 mG/Hr (15 mL/Hr) IV Continuous <Continuous>  pantoprazole  Injectable 40 milliGRAM(s) IV Push every 12 hours  propranolol 20 milliGRAM(s) Oral every 8 hours  vancomycin    Solution 125 milliGRAM(s) Oral every 6 hours  vancomycin  IVPB 1000 milliGRAM(s) IV Intermittent every 12 hours      Physical Exam  General: Patient comfortable in bed  Vital Signs Last 12 Hrs  T(F): 97.8 (08-16-21 @ 08:00), Max: 98 (08-16-21 @ 00:00)  HR: 100 (08-16-21 @ 11:00) (100 - 121)  BP: --  BP(mean): --  RR: 20 (08-16-21 @ 11:00) (13 - 30)  SpO2: 100% (08-16-21 @ 11:00) (99% - 100%)  Neck: No palpable thyroid nodules.  CVS: S1S2, No murmurs  Respiratory: No wheezing, no crepitations  GI: Abdomen soft, bowel sounds positive  Musculoskeletal:  edema lower extremities.     Diagnostics    Free Thyroxine, Serum: AM Sched. Collection: 12-Aug-2021 06:00 (08-11 @ 10:29)

## 2021-08-16 NOTE — PROGRESS NOTE ADULT - ATTENDING COMMENTS
Patient seen and examined. Discussed events with RN. Agree with above. PPI tx. Can give another trial of TF w erythromycin if patient agrees.

## 2021-08-16 NOTE — PROGRESS NOTE ADULT - ASSESSMENT
65 YO M with a history of stage D NICM s/p HM2 on 9/2017 as DT (due to severe PAD) with TV ring, prior COVID-19 infection 4/2020, recurrent syncope post LVAD s/p ILR, and chronic abdominal pain with prior negative workup who was admitted with symptomatic anemia with Hgb 4.5 in setting of INR 8.8 without hemodynamic instability. He was transfused and underwent VCE which showed active bleeding in the mid small bowel but subsequent enteroscopy 6/15 did not reveal any active bleeding. He acutely decompensated after procedure with fever/hypertension, low flow alarms, and pulmonary infiltrate with hypoxia requiring intubation from probable aspiration PNA. His LDH is normal and there are no signs of overt LVAD dysfunction on echo though signs of insufficiently unloaded ventricle for which his speed has been increased. Course notable for inability to wean ventilator with persistent secretions for which he underwent tracheostomy as well as acute c diff colitis, now resolved. Worsening anemia requiring transfusion with low flow alarms, concerning for recurrent GI bleed, now with stable Hgb. Had hypotension and septic picture. Cxs pos for serratia s/p Cholecystostomy tube by IR. Sputum culture from 8/13 positive for Enterobacter for which he is being treated with IV Meropenem and IV Vancomycin with downtrending leukocytosis. He has ongoing diffuse ABD pain with inability to tolerate tube feeds. CT ABD obtained to r/o mesenteric ischemia but limited evaluation due to artifact though with some concern of stenosis of proximal SMA. Plan to pursue mesenteric artery doppler today and vascular following for possible stent. Significant concern for malnutrition and further deconditioning in the setting of not able to tolerate feeds. Should nutrition via PEG be only viable option and he is not amendable, to consult palliative and ethics for further guidance.

## 2021-08-16 NOTE — PROGRESS NOTE ADULT - ASSESSMENT
66M Hx stage D NICM s/p HM2 on 9/2017 as DT (due to severe PAD) with TV ring, prior COVID-19 infection 4/2020, recurrent syncope post LVAD s/p ILR, and chronic abdominal pain with prior negative workup who was admitted with symptomatic anemia and supratherapeutic INR.     #Abd pain: pt with vacillating history regarding abdominal pain (reports location at times at per dawn/LVAD site, other times epigastric region, notes left sided pain many years prior // endorses chronicity possibly many years prior vs noted to be possibly x 1 week according to team // unclear if associated with TF). Now having brown BMs, no further recent bleeding. Ddx unclear source of pain -- possibly MSK at site of perc dawn drain/LVAD pump vs can consider esophagitis in setting of long standing NGT vs IBS vs unlikely biliary in nature (perc dawn draining well, normal LFTs, no collection on imaging) vs gastroparesis/ileus vs unlikely chronic mesenteric ischemia (vessels patent on imaging) vs unlikely SMA syndrome (borderline stenosis on doppler)  # Acute blood loss anemia and melena - hemodynamically unchanged. Likely had recurrent GI bleed this admission. Initial melena workup lead to capsule endoscopy on 6/14/2021 which revealed active bleeding in small bowel.  Single balloon 6/15/2021 revealed old blood in proximal ileum. 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  # LVAD on anticoagulation with coumadin  # Trach  # Tension pneumo s/p chest tube placement 7/26  # Septic shock - 2/2 serratia bacteremia 2/2 cholecystitis  # Acalculous cholecystitis - s/p perc dawn  #S/p Cdiff infection    Recommendations:  - c/w PPI BID in case esophagitis 2/2 NGT contributing to abdominal pain   - if patient willing to trial TF, can try erythromycin 250mg via NGT TID  - no plans for repeat endoscopic evaluation at this time  - rest of care per ICU      Susan Jacobo PGY-5  Gastroenterology Fellow  Pager #[phone] ([facility]) or [phone] (NS)  Available on [employer] Teams.  Please contact on-call GI fellow via answering service ([phone]) after 5pm and before 8am, and on weekends.

## 2021-08-16 NOTE — PROGRESS NOTE ADULT - PROBLEM SELECTOR PLAN 2
- Current speed 9200 RPM; no alarms in past 24 hrs  - HOLD coumadin given recurrent bleed. Will hold indefinitely.   - Will plan to hold aspirin indefinitely given recurrent GIB.  - Continue to monitor LDH, stable at 184 from 192 yesterday

## 2021-08-16 NOTE — PROGRESS NOTE ADULT - SUBJECTIVE AND OBJECTIVE BOX
RICKY HAMPTON  MRN-[id number]  Patient is a 66y old  Male who presents with a chief complaint of Anemia, Supratherapeutic INR, Dark Stools (16 Aug 2021 08:49)    HPI:  65M PMH ACC/AHA stage D HF due to NICM HM2 LVAD , TV annuloplasty ring 17 as destination therapy due to severe peripheral artery disease with significant stenosis  SIADH, Depression, CKD-3 with hyperkalemia, past E. coli UTIs, driveline drainage (21) and COVID-19 (back in 2020)  He was recently seen in clinic where he complained of abdominal pain and dark stools w constipation back in May. He presents to [facility] ER today weakness and fatigue, moderate and + Black stools for three days, on coumadin secondary to warfarin use in the setting of an LVAD. Patient has required transfusions for GIB in the past. Mostly recently back in 2021 pt had anemia with dark stools. No interventions was done at that time. However Last Endoscopy was done in 2020 (negative). Today labs show patient is anemic with H/H of 4.5/16.3,. INR is 8.84 MAP in the 90s, Temp 35.1. He denies any chest pain, shortness of breath, dizziness, abd pain, nausea or vomiting.       (2021 16:57)      Surgery/Hospital Course:   admit for melena w/ anemia, INR 8.84   6/15 Capsul study (+) for small bowel bleed, balloon endoscopy (old blood in prox ileum); post EGD - septic w/ L opacity, re-intubated for concern for aspiration, TTE (Mod MR, decrease biV w/ interventricular septum boweing towards R)   bronch    +C Diff    TC    CT C/A/P: Fluid filled colon which may be 2/2 rapid transit. Small bilateral pleffs with associates. Compressive atelectasis New ISABELLE & LLL  parenchymal opacities, suspicious for pneumonia. 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    #8 Shiley trach at bedside    CPAP trials    LVAD speed increased to 9200   Bronch; Central line dc'ed   TC since , continue as tolerated. Patient transferred to SDU.    Cont PT, no trach downsize today(#8cuffed)/ Anticoagulation/ Continue TF, speech f/u/ Vanco taper    oob to chair  INR  2.47  5 mg coumadin     increased lop to 25 q8  per HF   INR today 2.64.  H&H 7.3 this AM.  Will repeat CBC at noon, and will send stool guaiac Patient with persistent abdominal tenderness, rate of tube feeds decreased.  No nausea/vomiting.     INR today 2.4H&H 9.1.6 low flow overnight /N&V  NPO resolved for capsule study  Coumadin on hold refusing Tube feeds on D5 1/2 normal  @50 cc/hr   INR 2.69  H&H 7..1 refusing Tube feeds on D5 1/2 normal  @50 cc/hr. This am + BM Anusha Oneill HF  aware- PRBC x1  GI team consulted -  NPO  plan on study in am-  D/w Dr Cadet Patient  to return to CTU for further management; 1PRBCS    Post op INR 2.2 today.  No bleeding. BC + for SM.  Pt is hypotensive requiring pressor and inotropic support.  ID follow up today on Cefepime will follow.   R PTC for PTX    CT C/A/P: sub q emphysema in R chest wall, GGO RUL, small ascites CTH negative; Abd US: GB thickening, pericholecystic fluid     Perchole drain in place continues to drain total output overnight 133.  Fever today 38.8 given tylenol.  Will repeat BC now.     duplex LE negative    Patient with persistent abdominal pain, refusing tube feeds and medications, Psych consulted   CTA A/P ordered to r/o mesenteric ischemia 2/2 persistent anorexia, nausea, vomiting. Revealed:  Evaluation of the mesenteric vessels is limited by streak artifact from LVAD. There appears to be severe stenosis of the proximal SMA; abdominal mesenteric doppler is recommended for further evaluation. 2.  No small bowel findings to suggest acute mesenteric ischemia. 3.  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  8/15: Cultures resulted BC 1/2 +GPC in clusters, SC enterobacter.     Today:  Pt continue to not tolerate tube feeds. Following recommendations from vascular for questionable vasculitis for cause of intolerance of Tube feeds.     REVIEW OF SYSTEMS:  Unable to obtain, pt is trached.     ICU Vital Signs Last 24 Hrs  T(C): 36.1 (16 Aug 2021 04:00), Max: 36.7 (15 Aug 2021 16:00)  T(F): 96.9 (16 Aug 2021 04:00), Max: 98 (15 Aug 2021 16:00)  HR: 104 (16 Aug 2021 07:00) (91 - 121)  BP: --  BP(mean): --  ABP: 105/84 (16 Aug 2021 07:00) (87/62 - 148/119)  ABP(mean): 94 (16 Aug 2021 07:00) (71 - 135)  RR: 30 (16 Aug 2021 07:00) (16 - 39)  SpO2: 100% (16 Aug 2021 07:00) (100% - 100%)      Physical Exam:  Gen:  awake and alert, NAD, Malnourished  CNS:  intact, nonfocal, responds to all commands  Neck: no JVD, +TC   RES : course breath sounds, no wheezing              CVS: +LVAD hum   Abd: Soft, minimally-moderately tender in RUQ by perc dawn site, NT everywhere else, positive BS throughout. Perc dawn drain site c/d/i draining bilious fluid.  Skin: No rash  Ext:  no edema    ============================I/O===========================   I&O's Detail    15 Aug 2021 07:01  -  16 Aug 2021 07:00  --------------------------------------------------------  IN:    dextrose 5% + sodium chloride 0.45%: 1200 mL    Enteral Tube Flush: 175 mL    IV PiggyBack: 700 mL    Miscellaneous Tube Feedin mL    NiCARdipine: 65 mL  Total IN: 2290 mL    OUT:    Drain (mL): 100 mL    Propofol: 0 mL    Voided (mL): 1045 mL  Total OUT: 1145 mL    Total NET: 1145 mL        ============================ LABS =========================                        8.7    16.97 )-----------( 233      ( 16 Aug 2021 01:09 )             27.9     08-16    132<L>  |  97  |  17  ----------------------------<  151<H>  3.8   |  24  |  0.47<L>    Ca    9.7      16 Aug 2021 01:09  Phos  1.7     08-  Mg     1.7     08-    TPro  8.3  /  Alb  3.4  /  TBili  0.7  /  DBili  x   /  AST  19  /  ALT  55<H>  /  AlkPhos  167<H>  08-    LIVER FUNCTIONS - ( 16 Aug 2021 01:09 )  Alb: 3.4 g/dL / Pro: 8.3 g/dL / ALK PHOS: 167 U/L / ALT: 55 U/L / AST: 19 U/L / GGT: x             ABG - ( 16 Aug 2021 01:01 )  pH, Arterial: 7.47  pH, Blood: x     /  pCO2: 39    /  pO2: 195   / HCO3: 28    / Base Excess: 4.0   /  SaO2: 100               Urinalysis Basic - ( 14 Aug 2021 12:47 )    Color: Yellow / Appearance: Clear / S.041 / pH: x  Gluc: x / Ketone: Small  / Bili: Small / Urobili: Negative   Blood: x / Protein: 100 / Nitrite: Negative   Leuk Esterase: Negative / RBC: x / WBC x   Sq Epi: x / Non Sq Epi: x / Bacteria: x      ======================Micro/Rad/Cardio=================  Culture: Reviewed   CXR: Reviewed  Echo:Reviewed  ======================================================  PAST MEDICAL & SURGICAL HISTORY:  CHF (congestive heart failure)    CAD (coronary artery disease)    Depression    Pleural effusion    History of 2019 novel coronavirus disease (COVID-19)  2020    Hemorrhoids    Bleeding hemorrhoids    Peripheral arterial disease    Claudication    BPH with urinary obstruction    ACC/AHA stage D systolic heart failure    Anticoagulation goal of INR 2.0 to 2.5    Falls    Clavicle fracture    CKD (chronic kidney disease), stage III    Iron deficiency anemia    H/O epistaxis    Vertigo    GI bleed    S/P TVR (tricuspid valve repair)    S/P ventricular assist device    S/P endoscopy      ====================ASSESSMENT ==============  Stage D Nonischemic Cardiomyopathy, Status Post HM2 on 2017    Cardiogenic shock  Hemodynamic instability   Acute hypoxemic respiratory failure  GI bleed , Status Post Enteroscopy   Anemia, in setting of melena   Chronic Kidney Disease  Stress hyperglycemia   C.diff positive on    Hypovolemic shock  Septic shock    Plan:  ====================== NEUROLOGY=====================  Nonfocal, continue to monitor neuro status   Tylenol PRN for analgesia   Clonazepam for anxiety/sleep regimen   C/w mirtazapine for depression    acetaminophen    Suspension .. 650 milliGRAM(s) Oral every 6 hours PRN Mild Pain (1 - 3)  clonazePAM  Tablet 0.25 milliGRAM(s) Oral at bedtime  mirtazapine 7.5 milliGRAM(s) Oral daily  ==================== RESPIRATORY======================  Acute hypoxemic respiratory failure s/p #8 shiley trach on ; patient put back on assist control on  2/2 worsening of clinical condition. Will attempt CPAP trach collar weaning today.  Requiring intermittent full vent support, continue close monitoring of respiratory rate and breathing pattern, following of ABG’s with A-line monitoring, continuous pulse oximetry monitoring.     ====================CARDIOVASCULAR==================  Stage D Nonischemic Cardiomyopathy, Status Post HM2 on 2017; LVAD settings 9200   TTE : reveals at least moderate MR, mild AI, severe global LV syst dysfxn, dec RV fxn   Continue invasive hemodynamic monitoring   Propranolol for rate control of HR 2/2 Hyperthyroidism, titrate as tolerated  Nicardipine for pressor support given MAPS of 90s-100. Goal of 70s    niCARdipine Infusion 3 mG/Hr (15 mL/Hr) IV Continuous <Continuous>  propranolol 20 milliGRAM(s) Oral every 8 hours  ===================HEMATOLOGIC/ONC ===================  Acute blood loss anemia  Continue to monitor hemoglobin and hematocrit levels.   Continue Heparin for venous thromboembolism prophylaxis.   Continue to monitor LDH daily     heparin   Injectable 5000 Unit(s) SubCutaneous every 8 hours  ===================== RENAL =========================  Continue to monitor I/Os, BUN/Creatinine, and urine output.   Goal net negative fluid balance. Replete lytes PRN. Keep K> 4 and Mg >2.   ==================== GASTROINTESTINAL===================  CT A/P on : small ascites; ABD US showing GB wall thickening w/ surrounding pericholecystic fluid  S/p cholecystostomy tube placed on : with IR with -60cc of black bile, will monitor site closely.   Recent emesis on  in setting of abdominal pain, as per GI likely component of illeus given critical illness   If worsening abdominal pain or vomiting, recommended CT A/P to r/o toxic megacolon - continue Reglan TID  CTAP on : no acute intra-abdominal pathology notes   Bowel regimen with Miralax and Senna   Zofran PRN for nausea     CTA A/P : Evaluation of the mesenteric vessels is limited by streak artifact from LVAD. There appears to be severe stenosis of the proximal SMA; abdominal mesenteric doppler is recommended for further evaluation. 2.  No small bowel findings to suggest acute mesenteric ischemia. 3.  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  Vascular surgery and IR consulted for potential SMA stent. Both teams state that SMA stent is not warranted as they don't believe SMA stenosis is as severe on CTA as read by radiology and do not believe that this stenosis is causing patient's intolerance to TFs.  Awaiting mesenteric duplex.  ?Palliative care f/u this week  Continue Protonix for stress ulcer prophylaxis.     dextrose 5% + sodium chloride 0.45%. 1000 milliLiter(s) (50 mL/Hr) IV Continuous <Continuous>  pantoprazole  Injectable 40 milliGRAM(s) IV Push every 12 hours  ondansetron Injectable 4 milliGRAM(s) IV Push every 6 hours PRN Nausea and/or Vomiting  metoclopramide Injectable 10 milliGRAM(s) IV Push every 8 hours  =======================    ENDOCRINE  =====================  Bgl controlled, monitor glucose for need to initiate sliding scale.    Thyroid US to evaluate for thyroid nodule/goiter in setting of hyperthyroid  - contraindicated at this time 2/2 trach  Methimazole discontinued yesterday for transaminitis    ========================INFECTIOUS DISEASE================  Afebrile, Leukocytosis w/ WBC currently at 16.97  Cultures resulted BC 1/2 +GPC in clusters, SC enterobacter. C/w cefepime, and vancomycin   cefepime   IVPB      cefepime   IVPB 1000 milliGRAM(s) IV Intermittent every 8 hours  vancomycin    Solution 125 milliGRAM(s) Oral every 6 hours  vancomycin  IVPB 1000 milliGRAM(s) IV Intermittent every 12 hours      Patient requires continuous monitoring with bedside rhythm monitoring, pulse ox monitoring, and intermittent blood gas analysis. Care plan discussed with ICU care team. Patient remained critical and at risk for life threatening decompensation.     By signing my name below, Janina STANLEY Tiffany, attest that this documentation has been prepared under the direction and in the presence of CLAUDIA Johnsonally signed: Emily Roa Scribe, 21 @ 08:51    I, CLAUDIA Johnson , personally performed the services described in this documentation. all medical record entries made by the luisibe were at my direction and in my presence. I have reviewed the chart and agree that the record reflects my personal performance and is accurate and complete  Electronically signed: CLAUDIA Johnson        RICKY HAMPTON  MRN-[id number]  Patient is a 66y old  Male who presents with a chief complaint of Anemia, Supratherapeutic INR, Dark Stools (16 Aug 2021 08:49)    HPI:  65M PMH ACC/AHA stage D HF due to NICM HM2 LVAD , TV annuloplasty ring 17 as destination therapy due to severe peripheral artery disease with significant stenosis  SIADH, Depression, CKD-3 with hyperkalemia, past E. coli UTIs, driveline drainage (21) and COVID-19 (back in 2020)  He was recently seen in clinic where he complained of abdominal pain and dark stools w constipation back in May. He presents to [facility] ER today weakness and fatigue, moderate and + Black stools for three days, on coumadin secondary to warfarin use in the setting of an LVAD. Patient has required transfusions for GIB in the past. Mostly recently back in 2021 pt had anemia with dark stools. No interventions was done at that time. However Last Endoscopy was done in 2020 (negative). Today labs show patient is anemic with H/H of 4.5/16.3,. INR is 8.84 MAP in the 90s, Temp 35.1. He denies any chest pain, shortness of breath, dizziness, abd pain, nausea or vomiting.       (2021 16:57)      Surgery/Hospital Course:   admit for melena w/ anemia, INR 8.84   6/15 Capsul study (+) for small bowel bleed, balloon endoscopy (old blood in prox ileum); post EGD - septic w/ L opacity, re-intubated for concern for aspiration, TTE (Mod MR, decrease biV w/ interventricular septum boweing towards R)   bronch    +C Diff    TC    CT C/A/P: Fluid filled colon which may be 2/2 rapid transit. Small bilateral pleffs with associates. Compressive atelectasis New ISABELLE & LLL  parenchymal opacities, suspicious for pneumonia. 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    #8 Shiley trach at bedside    CPAP trials    LVAD speed increased to 9200   Bronch; Central line dc'ed   TC since , continue as tolerated. Patient transferred to SDU.    Cont PT, no trach downsize today(#8cuffed)/ Anticoagulation/ Continue TF, speech f/u/ Vanco taper    oob to chair  INR  2.47  5 mg coumadin     increased lop to 25 q8  per HF   INR today 2.64.  H&H 7.3 this AM.  Will repeat CBC at noon, and will send stool guaiac Patient with persistent abdominal tenderness, rate of tube feeds decreased.  No nausea/vomiting.     INR today 2.4H&H 9.1.6 low flow overnight /N&V  NPO resolved for capsule study  Coumadin on hold refusing Tube feeds on D5 1/2 normal  @50 cc/hr   INR 2.69  H&H 7..1 refusing Tube feeds on D5 1/2 normal  @50 cc/hr. This am + BM Anusha Oneill HF  aware- PRBC x1  GI team consulted -  NPO  plan on study in am-  D/w Dr Cadet Patient  to return to CTU for further management; 1PRBCS    Post op INR 2.2 today.  No bleeding. BC + for SM.  Pt is hypotensive requiring pressor and inotropic support.  ID follow up today on Cefepime will follow.   R PTC for PTX    CT C/A/P: sub q emphysema in R chest wall, GGO RUL, small ascites CTH negative; Abd US: GB thickening, pericholecystic fluid     Perchole drain in place continues to drain total output overnight 133.  Fever today 38.8 given tylenol.  Will repeat BC now.     duplex LE negative    Patient with persistent abdominal pain, refusing tube feeds and medications, Psych consulted   CTA A/P ordered to r/o mesenteric ischemia 2/2 persistent anorexia, nausea, vomiting. Revealed:  Evaluation of the mesenteric vessels is limited by streak artifact from LVAD. There appears to be severe stenosis of the proximal SMA; abdominal mesenteric doppler is recommended for further evaluation. 2.  No small bowel findings to suggest acute mesenteric ischemia. 3.  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  8/15: Cultures resulted BC 1/2 +GPC in clusters, SC enterobacter.     Today:  Pt continue to not tolerate tube feeds. Following recommendations from vascular for concern for SMA stenosis. Plan to mobilize as tolerated, restart TF as tolerated    REVIEW OF SYSTEMS:  Unable to obtain, pt is trached.     ICU Vital Signs Last 24 Hrs  T(C): 36.1 (16 Aug 2021 04:00), Max: 36.7 (15 Aug 2021 16:00)  T(F): 96.9 (16 Aug 2021 04:00), Max: 98 (15 Aug 2021 16:00)  HR: 104 (16 Aug 2021 07:00) (91 - 121)  BP: --  BP(mean): --  ABP: 105/84 (16 Aug 2021 07:00) (87/62 - 148/119)  ABP(mean): 94 (16 Aug 2021 07:00) (71 - 135)  RR: 30 (16 Aug 2021 07:00) (16 - 39)  SpO2: 100% (16 Aug 2021 07:00) (100% - 100%)      Physical Exam:  Gen:  awake and alert, NAD, Malnourished  CNS:  intact, nonfocal, responds to all commands  Neck: no JVD, +TC   RES : course breath sounds, no wheezing              CVS: +LVAD hum   Abd: Soft, minimally-moderately tender in RUQ by perc dawn site, NT everywhere else, positive BS throughout. Perc dawn drain site c/d/i draining bilious fluid.  Skin: No rash  Ext:  no edema    ============================I/O===========================   I&O's Detail    15 Aug 2021 07:01  -  16 Aug 2021 07:00  --------------------------------------------------------  IN:    dextrose 5% + sodium chloride 0.45%: 1200 mL    Enteral Tube Flush: 175 mL    IV PiggyBack: 700 mL    Miscellaneous Tube Feedin mL    NiCARdipine: 65 mL  Total IN: 2290 mL    OUT:    Drain (mL): 100 mL    Propofol: 0 mL    Voided (mL): 1045 mL  Total OUT: 1145 mL    Total NET: 1145 mL        ============================ LABS =========================                        8.7    16.97 )-----------( 233      ( 16 Aug 2021 01:09 )             27.9     08-16    132<L>  |  97  |  17  ----------------------------<  151<H>  3.8   |  24  |  0.47<L>    Ca    9.7      16 Aug 2021 01:09  Phos  1.7     08-16  Mg     1.7     08-16    TPro  8.3  /  Alb  3.4  /  TBili  0.7  /  DBili  x   /  AST  19  /  ALT  55<H>  /  AlkPhos  167<H>  08-    LIVER FUNCTIONS - ( 16 Aug 2021 01:09 )  Alb: 3.4 g/dL / Pro: 8.3 g/dL / ALK PHOS: 167 U/L / ALT: 55 U/L / AST: 19 U/L / GGT: x             ABG - ( 16 Aug 2021 01:01 )  pH, Arterial: 7.47  pH, Blood: x     /  pCO2: 39    /  pO2: 195   / HCO3: 28    / Base Excess: 4.0   /  SaO2: 100               Urinalysis Basic - ( 14 Aug 2021 12:47 )    Color: Yellow / Appearance: Clear / S.041 / pH: x  Gluc: x / Ketone: Small  / Bili: Small / Urobili: Negative   Blood: x / Protein: 100 / Nitrite: Negative   Leuk Esterase: Negative / RBC: x / WBC x   Sq Epi: x / Non Sq Epi: x / Bacteria: x      ======================Micro/Rad/Cardio=================  Culture: Reviewed   CXR: Reviewed  Echo:Reviewed  ======================================================  PAST MEDICAL & SURGICAL HISTORY:  CHF (congestive heart failure)    CAD (coronary artery disease)    Depression    Pleural effusion    History of 2019 novel coronavirus disease (COVID-19)  2020    Hemorrhoids    Bleeding hemorrhoids    Peripheral arterial disease    Claudication    BPH with urinary obstruction    ACC/AHA stage D systolic heart failure    Anticoagulation goal of INR 2.0 to 2.5    Falls    Clavicle fracture    CKD (chronic kidney disease), stage III    Iron deficiency anemia    H/O epistaxis    Vertigo    GI bleed    S/P TVR (tricuspid valve repair)    S/P ventricular assist device    S/P endoscopy      ====================ASSESSMENT ==============  Stage D Nonischemic Cardiomyopathy, Status Post HM2 on 2017    Cardiogenic shock  Hemodynamic instability   Acute hypoxemic respiratory failure  GI bleed , Status Post Enteroscopy   Anemia, in setting of melena   Chronic Kidney Disease  Stress hyperglycemia   C.diff positive on    Hypovolemic shock  Septic shock    Plan:  ====================== NEUROLOGY=====================  Nonfocal, continue to monitor neuro status   Tylenol PRN for analgesia   Clonazepam for anxiety/sleep regimen   C/w mirtazapine for depression    acetaminophen    Suspension .. 650 milliGRAM(s) Oral every 6 hours PRN Mild Pain (1 - 3)  clonazePAM  Tablet 0.25 milliGRAM(s) Oral at bedtime  mirtazapine 7.5 milliGRAM(s) Oral daily  ==================== RESPIRATORY======================  Acute hypoxemic respiratory failure s/p #8 shiley trach on ; patient put back on assist control on  2/2 worsening of clinical condition. Will attempt CPAP trach collar weaning today.  Requiring intermittent full vent support, continue close monitoring of respiratory rate and breathing pattern, following of ABG’s with A-line monitoring, continuous pulse oximetry monitoring.     ====================CARDIOVASCULAR==================  Stage D Nonischemic Cardiomyopathy, Status Post HM2 on 2017; LVAD settings 9200   TTE : reveals at least moderate MR, mild AI, severe global LV syst dysfxn, dec RV fxn   Continue invasive hemodynamic monitoring   Propranolol for rate control of HR 2/2 Hyperthyroidism, titrate as tolerated  Nicardipine for pressor support given MAPS of 90s-100. Goal of 70s    niCARdipine Infusion 3 mG/Hr (15 mL/Hr) IV Continuous <Continuous>  propranolol 20 milliGRAM(s) Oral every 8 hours  ===================HEMATOLOGIC/ONC ===================  Acute blood loss anemia  Continue to monitor hemoglobin and hematocrit levels.   Continue Heparin for venous thromboembolism prophylaxis.   Continue to monitor LDH daily     heparin   Injectable 5000 Unit(s) SubCutaneous every 8 hours  ===================== RENAL =========================  Continue to monitor I/Os, BUN/Creatinine, and urine output.   Goal net negative fluid balance. Replete lytes PRN. Keep K> 4 and Mg >2.   ==================== GASTROINTESTINAL===================  CT A/P on : small ascites; ABD US showing GB wall thickening w/ surrounding pericholecystic fluid  S/p cholecystostomy tube placed on : with IR with -60cc of black bile, will monitor site closely.   Recent emesis on  in setting of abdominal pain, as per GI likely component of illeus given critical illness   If worsening abdominal pain or vomiting, recommended CT A/P to r/o toxic megacolon - continue Reglan TID  CTAP on : no acute intra-abdominal pathology notes   Bowel regimen with Miralax and Senna   Zofran PRN for nausea     CTA A/P : Evaluation of the mesenteric vessels is limited by streak artifact from LVAD. There appears to be severe stenosis of the proximal SMA; abdominal mesenteric doppler is recommended for further evaluation. 2.  No small bowel findings to suggest acute mesenteric ischemia. 3.  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  Vascular surgery and IR consulted for potential SMA stent. Both teams state that SMA stent is not warranted as they don't believe SMA stenosis is as severe on CTA as read by radiology and do not believe that this stenosis is causing patient's intolerance to TFs.  Awaiting mesenteric duplex.  ?Palliative care f/u this week  Continue Protonix for stress ulcer prophylaxis.     dextrose 5% + sodium chloride 0.45%. 1000 milliLiter(s) (50 mL/Hr) IV Continuous <Continuous>  pantoprazole  Injectable 40 milliGRAM(s) IV Push every 12 hours  ondansetron Injectable 4 milliGRAM(s) IV Push every 6 hours PRN Nausea and/or Vomiting  metoclopramide Injectable 10 milliGRAM(s) IV Push every 8 hours  =======================    ENDOCRINE  =====================  Bgl controlled, monitor glucose for need to initiate sliding scale.    Thyroid US to evaluate for thyroid nodule/goiter in setting of hyperthyroid  - contraindicated at this time 2/2 trach  Methimazole discontinued yesterday for transaminitis    ========================INFECTIOUS DISEASE================  Afebrile, Leukocytosis w/ WBC currently at 16.97  Cultures resulted BC 1/2 +GPC in clusters, SC enterobacter. C/w cefepime, and vancomycin   cefepime   IVPB      cefepime   IVPB 1000 milliGRAM(s) IV Intermittent every 8 hours  vancomycin    Solution 125 milliGRAM(s) Oral every 6 hours  vancomycin  IVPB 1000 milliGRAM(s) IV Intermittent every 12 hours      Patient requires continuous monitoring with bedside rhythm monitoring, pulse ox monitoring, and intermittent blood gas analysis. Care plan discussed with ICU care team. Patient remained critical and at risk for life threatening decompensation.     By signing my name below, Janina STANLEY Tiffany, attest that this documentation has been prepared under the direction and in the presence of CLAUDIA Johnson   Electronically signed: Emily Roa Scribe, 21 @ 08:51    I, CLAUDIA Johnson , personally performed the services described in this documentation. all medical record entries made by the luisibmel were at my direction and in my presence. I have reviewed the chart and agree that the record reflects my personal performance and is accurate and complete  Electronically signed: CLAUDIA Johnson

## 2021-08-16 NOTE — PROGRESS NOTE ADULT - SUBJECTIVE AND OBJECTIVE BOX
[facility] NUTRITION SUPPORT-- FOLLOW UP NOTE  --------------------------------------------------------------------------------    24 hour events/subjective: pt seen and examined, resting in bed. per rn pt was receiving trickle feeds earlier and tolerating, then issue with feeding pump and thereafter pt refused further feedings. no vomiting overnight but spitting up of saliva, no bm. pt c/o nausea and generalized abd pain. mesenteric duplex showing borderline stenosis of prox sma. afebrile overnight,  and 8/15 bld cxs ngtd. prealb 11, tg 141, prior a1c 5.6    Diet:  Diet, NPO with Tube Feed:   Tube Feeding Modality: Nasogastric  TwoCal HN (TWOCALHNRTH)  Total Volume for 24 Hours (mL): 240  Continuous  Starting Tube Feed Rate mL per Hour: 10  Until Goal Tube Feed Rate (mL per Hour): 10  Tube Feed Duration (in Hours): 24  Tube Feed Start Time: 13:25 (08-15-21 @ 13:24)      PAST HISTORY  --------------------------------------------------------------------------------  No significant changes to PMH, PSH, FHx, SHx, unless otherwise noted    ALLERGIES & MEDICATIONS  --------------------------------------------------------------------------------  Allergies    No Known Allergies    Intolerances      Standing Inpatient Medications  cefepime   IVPB      cefepime   IVPB 1000 milliGRAM(s) IV Intermittent every 8 hours  chlorhexidine 0.12% Liquid 15 milliLiter(s) Oral Mucosa every 12 hours  clonazePAM  Tablet 0.25 milliGRAM(s) Oral at bedtime  dextrose 5% + sodium chloride 0.45%. 1000 milliLiter(s) IV Continuous <Continuous>  heparin   Injectable 5000 Unit(s) SubCutaneous every 8 hours  metoclopramide Injectable 10 milliGRAM(s) IV Push every 8 hours  mirtazapine 7.5 milliGRAM(s) Oral daily  niCARdipine Infusion 3 mG/Hr IV Continuous <Continuous>  pantoprazole  Injectable 40 milliGRAM(s) IV Push every 12 hours  propranolol 20 milliGRAM(s) Oral every 8 hours  vancomycin    Solution 125 milliGRAM(s) Oral every 6 hours  vancomycin  IVPB 1000 milliGRAM(s) IV Intermittent every 12 hours    PRN Inpatient Medications  acetaminophen    Suspension .. 650 milliGRAM(s) Oral every 6 hours PRN  ondansetron Injectable 4 milliGRAM(s) IV Push every 6 hours PRN        REVIEW OF SYSTEMS  --------------------------------------------------------------------------------  see hpi, unable to obtain in entirety    VITALS/PHYSICAL EXAM  --------------------------------------------------------------------------------  T(C): 36.1 (21 @ 04:00), Max: 36.9 (08-15-21 @ 08:00)  HR: 104 (21 @ 07:00) (91 - 121)  BP: --  RR: 30 (21 @ 07:00) (16 - 39)  SpO2: 100% (21 @ 07:00) (100% - 100%)  Wt(kg): --      08-15-21 @ 07:21 @ 07:00  --------------------------------------------------------  IN: 2290 mL / OUT: 1145 mL / NET: 1145 mL      I&O's Detail    15 Aug 2021 07:01  -  16 Aug 2021 07:00  --------------------------------------------------------  IN:    dextrose 5% + sodium chloride 0.45%: 1200 mL    Enteral Tube Flush: 175 mL    IV PiggyBack: 700 mL    Miscellaneous Tube Feedin mL    NiCARdipine: 65 mL  Total IN: 2290 mL    OUT:    Drain (mL): 100 mL    Propofol: 0 mL    Voided (mL): 1045 mL  Total OUT: 1145 mL    Total NET: 1145 mL          Physical Exam:  Gen: lying in bed frail  HEENT: +trach+ngt clamped  Chest: respirations non labored  Abd: soft non distended perc c-tube in place with bilious drainage  LE: no edema  Neuro: awake alert responsive      LABS/STUDIES  --------------------------------------------------------------------------------              8.7    16.97 >-----------<  233      [21 @ 01:09]              27.9     132  |  97  |  17  ----------------------------<  151      [21 @ 01:09]  3.8   |  24  |  0.47        Ca     9.7     [21 @ 01:09]      Mg     1.7     [21 @ 01:09]      Phos  1.7     [21 @ 01:09]    TPro  8.3  /  Alb  3.4  /  TBili  0.7  /  DBili  x   /  AST  19  /  ALT  55  /  AlkPhos  167  [21 @ 01:09]              [21 @ 01:09]    Ca ionizedBlood Gas Arterial - Calcium, Ionized: 1.27 mmoL/L (21 @ 01:01)  Blood Gas Arterial - Calcium, Ionized: 1.24 mmoL/L (08-15-21 @ 15:10)    Creatinine Trend:  POC glucoseGlucose, Serum: 151 mg/dL (21 @ 01:09)  CAPILLARY BLOOD GLUCOSE      POCT Blood Glucose.: 187 mg/dL (15 Aug 2021 13:06)    PrealbuminPrealbumin, Serum: 11 mg/dL (21 @ 04:01)  Prealbumin, Serum: 10 mg/dL (08-15-21 @ 13:53)    Triglycerides

## 2021-08-16 NOTE — PROGRESS NOTE ADULT - ASSESSMENT
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 coli UTIs, driveline drainage (1/7/21) and COVID-19, here initially for GIB prolonged hospital course, s/p tracheostomy, acalculous cholecystitis s/p perc dawn. Patient has persistent intermittent abdominal pain. Per CTU team, pt has recently been refusing tube feeds and is being evaluated for chronic mesenteric ischemia vs SMA syndrome.  8/13 bld cxs positive. Consulted for TPN. Prealb 11, prior a1c 5.6, tg 141. Mesenteric duplex showing borderline stenosis of prox sma.    -no plan to initiate TPN at this time, to reassess pending clinical course  -intolerance to trickle feeds, will dw GI, ?benefit of post pyloric feeds  -abdominal pain- w/u in progress, gi/vascular/rheum input noted  -bacteremia- cont abx, f/u repeat cxs, trend wbc  -anemia - rec iron supplementation when feasible (hold in the setting of infection)  -hypophosphatemia/magnesemia- rec replete prn, at risk for refeeding syndrome  -care per CTU, will follow and remain available as needed    plan dw with Dr. Soliz and Dr. IVAN Tang, agreeable with above    TPN pager [phone], spectra [phone]  M-F 6A-4P, Weekends and holidays 8A-12P   66M PMH ACC/AHA stage D HF due to NICM HM2 LVAD , TV annuloplasty ring 9/12/17 as destination therapy due to severe peripheral artery disease with significant stenosis  SIADH, Depression, CKD-3 with hyperkalemia, past E. coli UTIs, driveline drainage (1/7/21) and COVID-19, here initially for GIB prolonged hospital course, s/p tracheostomy, acalculous cholecystitis s/p perc dawn. Patient has persistent intermittent abdominal pain. Per CTU team, pt has recently been refusing tube feeds and is being evaluated for chronic mesenteric ischemia vs SMA syndrome.  8/13 bld cxs positive. Consulted for TPN. Prealb 11, prior a1c 5.6, tg 141. Mesenteric duplex showing borderline stenosis of prox sma.    -no plan to initiate TPN at this time, to reassess pending clinical course  -intolerance to trickle feeds, will dw GI team  -abdominal pain- w/u in progress, gi/vascular/rheum input noted  -bacteremia- cont abx, f/u repeat cxs, trend wbc  -anemia - rec iron supplementation when feasible (hold in the setting of infection)  -hypophosphatemia/magnesemia- rec replete prn, at risk for refeeding syndrome  -care per CTU, will follow and remain available as needed    plan dw with Dr. Soliz and Dr. IVAN Tang, agreeable with above    TPN pager [phone], spectra [phone]  M-F 6A-4P, Weekends and holidays 8A-12P

## 2021-08-16 NOTE — PROGRESS NOTE ADULT - SUBJECTIVE AND OBJECTIVE BOX
RICKY JOINT  MRN#: [id number]  Subjective:  Pulmonary progress  : recurrent Acute hypoxic respiratory Failure ,aspiration pneumonia, NICM  , chart reviewed and H/O obtained radiological and Laboratory study reviewed patient Examined     65M PMH ACC/AHA stage D HF due to NICM HM2 LVAD , TV annuloplasty ring 17 as destination therapy due to severe peripheral artery disease with significant stenosis  SIADH, Depression, CKD-3 with hyperkalemia, past E. coli UTIs, driveline drainage (21) and COVID-19 (back in 2020)  He was recently seen in clinic where he complained of abdominal pain and dark stools w constipation back in May. He presents to [facility] ER today weakness and fatigue, moderate and + Black stools for three days, on coumadin secondary to warfarin use in the setting of an LVAD. Patient has required transfusions for GIB in the past. Mostly recently back in 2021 pt had anemia with dark stools. No interventions was done at that time. However Last Endoscopy was done in 2020 (negative). Today labs show patient is anemic with H/H of 4.5/16.3,. INR is 8.84 MAP in the 90s, Temp 35.1. He denies any chest pain, shortness of breath, dizziness, abd pain, nausea or vomiting. found to have  rectal bleeding underwent endoscopy ,old blood in the proximal ileum ,  develop sepsis with LL opacity given Antibiotics , Extubated , reintubated , Bronchoscopy on Zosyn for LL pneumonia  and Amiodarone S/P TV Annuloplasty , patient remain intubated on full ventilatory support .S/P multiple units of blood transfusion , remain on full ventilatory support on Precedex and propofol , new central IJ line , diarrhea C diff. +ve on po vancomycin and IV Flagyl,  mildly distended belly , fever start on cefepime 2gm q 8 hrs S/P tracheostomy .  new RT Subclavian central line continue on contact  isolation ,still diarrhea on C-diff antibiotics ENT follow up appreciated , trial of C-PAP as tolerated , , copious secretion from trach. site chest x ray left lower lobe pneumonia , tolerating trch. collar 50% FI02 still excessive secretion need pulmonary toilet , off Ancef antibiotic , no more diarrhea back on full support mechanical ventilator , chest x ray show improvement in LLL air space disease, more awake and responsive on tube feeding no more diarrhea , S/P  Ancef for bacteremia no fever ,ID follow up noted ,  no nausea or vomiting or diarrhea still very weak and tired , event note pulled NG tube now replaced , back on tube feeding ,still on po vancomycin , getting PT and OT at bed side , no plan for decannulation for now. , no more diarrhea receiving PT and OPT at bed side , minimal secretion from tracheostomy site , no SOB getting stronger , improve muscle tone patient transfer to monitor bed still on contact isolation for C-Difficel colitis on 50% FI02, NG tube clogged , and change to resume tube feeding still loose stool . H/H drop significantly require blood transfusion , most likely GI bleeding , IV heparin D/C , vomiting 200 cc of creamy color tube feeding on hold no sob, still melena monitor in the CTU possible capsule endoscopy , H/H is stable ., patient develop TR sided  pneumothorax require chest tube placement , RT IJ central line  placed , develop fever shaking chills , blood culture positive for serratia marcescens , start on cefepime .the patient  become hypoxic and hypotensive placed on full ventilatory support and Vasopressin , levophed and Dobutamine ,S/P blood transfusion on meropenem and vancomycin , IR note appreciated   , on and  off pressors , occasional agitation on Precedex .S/P IR cholecystostomy tube drainage placement in the RT upper Quadrant , resume anticoagulation chest x ray noted C-PAP trail lasted only for 2 hrs , new RT SC line and D/C RT IJ line , RT pig tail cathter has been removed , tolerating C-PAP trial placed on trach. collar 50% FI02 GI consultant noted on NG tube feeding as tolerated , develop AF RVR S/P  bolus Amiodarone  back to regular sinus Rhythm , Flat Affect depressed , back on tube feeding Vital AF at 60 cc/ hr .still intermittent abdominal pain , no fever saturation is accepted  back on full ventilatory support , tired and sleepy on round   Dark stool evaluated by Vascular service recommend mesenteric Duplex. new Rt IJ line , C-PAP trial to wean off ventilator to wean to trah.collar 50% FI02       (2021 16:57)    PAST MEDICAL & SURGICAL HISTORY:  CHF (congestive heart failure)    CAD (coronary artery disease)  Depression    Pleural effusion    History of 2019 novel coronavirus disease (COVID-19)  2020    Hemorrhoids    Bleeding hemorrhoids    Peripheral arterial disease    Claudication    BPH with urinary obstruction    ACC/AHA stage D systolic heart failure  Anticoagulation goal of INR 2.0 to 2.5    Falls    Clavicle fracture    CKD (chronic kidney disease), stage III    Iron deficiency anemia    H/O epistaxis    Vertigo    GI bleed    S/P TVR (tricuspid valve repair)    S/P ventricular assist device    S/P endoscopy    OBJECTIVE:  ICU Vital Signs Last 24 Hrs  T(C): 38.2 (2021 10:00), Max: 38.5 (2021 12:00)  T(F): 100.8 (2021 10:00), Max: 101.3 (2021 12:00)  HR: 65 (2021 10:00) (61 - 69)  BP: --  BP(mean): --  ABP: 105/67 (2021 10:00) (90/54 - 113/64)  ABP(mean): 77 (2021 10:00) (63 - 77)  RR: 20 (2021 10:00) (19 - 35)  SpO2: 99% (2021 10:00) (96% - 100%)       @ 07: @ 07:00  --------------------------------------------------------  IN: 2693.9 mL / OUT: 1415 mL / NET: 1278.9 mL     @ 07: @ 10:49  --------------------------------------------------------  IN: 420.8 mL / OUT: 115 mL / NET: 305.8 mL  PHYSICAL EXAM:Daily   Elderly male S/P tracheostomy on trach. collar 50% FI02 ,  Daily Weight in k.4 (2021 00:00)  HEENT:     + NCAT  + EOMI  - Conjuctival edema   - Icterus   - Thrush   - ETT  + NGT/OGT  Neck:         + FROM  RT IJ  line JVD  - Nodes - Masses + Mid-line trachea + Tracheostomy  Chest:            normal A-P diameter    Lungs:          + CTA   + Rhonchi    - Rales    - Wheezing + Decreased  LT BS   - Dullness R L  Cardiac:       + S1 + S2    + RRR   - Irregular   - S3  - S4    - Murmurs   - Rub   - Hamman’s sign   Abdomen:    + BS  + Soft + Non-tender - Distended - Organomegaly - PEG .cholecystostomy tube in place  Extremities:   - Cyanosis U/L   - Clubbing  U/L  + LE/UE Edema   + Capillary refill    + Pulses   Neuro:        - Awake   -  Alert   - Confused   - Lethargic   + Sedated  + Generalized Weakness  Skin:        - Rashes    - Erythema   + Normal incisions   + IV sites intact          HOSPITAL MEDICATIONS: All medications reviewed and analyzed  MEDICATIONS  (STANDING):  amiodarone    Tablet 200 milliGRAM(s) Oral daily  chlorhexidine 0.12% Liquid 15 milliLiter(s) Oral Mucosa every 12 hours  chlorhexidine 2% Cloths 1 Application(s) Topical <User Schedule>  dexmedetomidine Infusion 0.5 MICROgram(s)/kG/Hr (9.81 mL/Hr) IV Continuous <Continuous>  dextrose 50% Injectable 50 milliLiter(s) IV Push every 15 minutes  heparin  Infusion 400 Unit(s)/Hr (12.5 mL/Hr) IV Continuous <Continuous>  Hydromorphone  Injectable 0.5 milliGRAM(s) IV Push once  insulin lispro (ADMELOG) corrective regimen sliding scale   SubCutaneous every 6 hours  pantoprazole  Injectable 40 milliGRAM(s) IV Push every 12 hours  piperacillin/tazobactam IVPB.. 3.375 Gram(s) IV Intermittent every 8 hours  propofol Infusion 20 MICROgram(s)/kG/Min (9.42 mL/Hr) IV Continuous <Continuous>  sodium chloride 0.9% lock flush 3 milliLiter(s) IV Push every 8 hours  sodium chloride 0.9%. 1000 milliLiter(s) (10 mL/Hr) IV Continuous <Continuous>    MEDICATIONS  (PRN):  acetaminophen    Suspension .. 650 milliGRAM(s) Oral every 6 hours PRN Temp greater or equal to 38C (100.4F)    LABS: All Lab data reviewed and analyzed                                  8.7    16.97 )-----------( 233      ( 16 Aug 2021 01:09 )             27.9   08-16    132<L>  |  97  |  17  ----------------------------<  151<H>  3.8   |  24  |  0.47<L>    Ca    9.7      16 Aug 2021 01:09  Phos  1.7     08-16  Mg     1.7     08-16    TPro  8.3  /  Alb  3.4  /  TBili  0.7  /  DBili  x   /  AST  19  /  ALT  55<H>  /  AlkPhos  167<H>  08-      Ca    10.3      15 Aug 2021 00:29  Phos  2.0     08-15  Mg     1.8     -15    TPro  8.6<H>  /  Alb  3.5  /  TBili  1.2  /  DBili  x   /  AST  39  /  ALT  83<H>  /  AlkPhos  131<H>  08-15    Ca    10.7<H>      14 Aug 2021 00:53  Phos  2.8     08-14  Mg     1.7     -14    TPro  9.1<H>  /  Alb  3.9  /  TBili  1.3<H>  /  DBili  x   /  AST  118<H>  /  ALT  126<H>  /  AlkPhos  148<H>      Ca    10.0      12 Aug 2021 00:22  Phos  4.4     08-12  Mg     1.8     08-12    TPro  8.4<H>  /  Alb  3.8  /  TBili  0.5  /  DBili  x   /  AST  43<H>  /  ALT  43  /  AlkPhos  137<H>  -    Ca    9.6      11 Aug 2021 00:29  Phos  3.6     08-11  Mg     1.7     08-11    TPro  8.4<H>  /  Alb  3.7  /  TBili  0.5  /  DBili  x   /  AST  29  /  ALT  32  /  AlkPhos  145<H>  08-    Ca    9.7      10 Aug 2021 00:51  Phos  3.5     08-10  Mg     1.7     08-10    TPro  7.8  /  Alb  3.5  /  TBili  0.6  /  DBili  x   /  AST  33  /  ALT  30  /  AlkPhos  140<H>  08-10      Ca    9.1      09 Aug 2021 00:33  Phos  3.3     08-09  Mg     1.5     08-    TPro  7.7  /  Alb  3.6  /  TBili  0.6  /  DBili  x   /  AST  25  /  ALT  24  /  AlkPhos  137<H>  08-09                                                                                   PTT - ( 2021 04:52 )  PTT:45.2 sec LIVER FUNCTIONS - ( 2021 00:42 )  Alb: 3.4 g/dL / Pro: 6.7 g/dL / ALK PHOS: 213 U/L / ALT: 15 U/L / AST: 24 U/L / GGT: x           RADIOLOGY: - Reviewed and analyzed RT Pig tail cathter  , LVAD HM2, CT scan of abdomen reviewed result noted

## 2021-08-17 NOTE — PROGRESS NOTE ADULT - ASSESSMENT
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 coli UTIs, driveline drainage (1/7/21) and COVID-19, here initially for GIB prolonged hospital course, s/p tracheostomy, acalculous cholecystitis s/p perc dawn. Patient has persistent intermittent abdominal pain. Per CTU team, pt has recently been refusing tube feeds and is being evaluated for chronic mesenteric ischemia vs SMA syndrome.  8/13 bld cxs positive. Consulted for TPN. Prealb 11, prior a1c 5.6, tg 141. 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 Continues with tf intolerance.     -no plan to initiate TPN at this time, to reassess risk/benefit profile pending clinical course  -suspect uncontrolled hyperthyroidism contributing to metabolic issues; restarted on methimazole today; endocrine following  -abdominal pain- w/u in progress, gi/vascular/rheum input noted  -bacteremia- cont abx, trend wbc  -anemia - rec iron supplementation when feasible (hold in the setting of infection)  -hypokalemia/phosphatemia/magnesemia- rec replete prn  -suggest palliative care input to further delineate goals of care  -management per CTU, will follow and remain available as needed    plan dw with Dr. Soliz and Dr. IVAN Tang at length, agreeable with above    TPN pager [phone], spectra [phone]  M-F 6A-4P, Weekends and holidays 8A-12P

## 2021-08-17 NOTE — PROGRESS NOTE ADULT - ASSESSMENT
Assessment  Hyperthyroidism: 66y Male with no history thyroid disease, was not on any thyroid supplements, previously euthyroid (June 2021), was on amiodarone (last dose 8/7), now in hyperthyroid state, Hashimotos, started on Methimazole 10mg TID and propanolol for tachycardia, DC'd methimazole due to LFTs persistently trending up, now LFTs improving, Methimazole restarted by primary team this AM, thyroid US pending, remains on Prednisone.  Hyperglycemia: Patient in nondiabetic range, A1C 5.6%, now on insulin coverage, blood sugars in acceptable range, no hypoglycemias, on TFs.  CHF: on medications, stable, monitored.  Anemia: Monitored      Pavan Barone MD  Cell: [phone]  Office: [phone]       Assessment  Hyperthyroidism: 66y Male with no history thyroid disease, was not on any thyroid supplements, previously euthyroid (June 2021), was on amiodarone (last dose 8/7), now in hyperthyroid state, Hashimotos, started on Methimazole 10mg  TID and propanolol for tachycardia, DC'd methimazole due to LFTs persistently trending up, now LFTs improving, Methimazole restarted by primary team this AM, thyroid US pending, remains on Prednisone.  Hyperglycemia: Patient in nondiabetic range, A1C 5.6%, now on insulin coverage, blood sugars in acceptable range, no hypoglycemias, on TFs.  CHF: on medications, stable, monitored.  Anemia: Monitored      Pavna Barone MD  Cell: [phone]  Office: [phone]

## 2021-08-17 NOTE — PROGRESS NOTE ADULT - PROBLEM SELECTOR PLAN 2
- Current speed 9200 RPM; no alarms in past 24 hrs  - HOLD coumadin given recurrent bleed. Will hold indefinitely.   - Will plan to hold aspirin indefinitely given recurrent GIB.  - Continue to monitor LDH, stable

## 2021-08-17 NOTE — PROGRESS NOTE ADULT - ASSESSMENT
Assessment and Recommendation:   · Assessment	  Assessment and recommendation :  Recurrent Acute hypoxic respiratory Failure S/P tracheostomy on trach. collar  50% FI02,   Acute blood loss anemia S/P multiple  blood transfusion   S/P septic shock off vasopressin , levophed and off  Dobutamine   S/P sepsis with serratia marcescens in the blood S/P  cefepime  S/P cholecystostomy tube placement by IR    AF RVR back to regular sinus Rhythm   Non ischemic cardiomyopathy continue ACE inhibitor and B-Blockers   S/P Septic shock and cardiogenic shock   Stage D systolic heart failure S/P LVAD HM2   MH2 LVAD  with  TV Annuloplasty  Severe peripheral vascular disease   severe hyperglycemia on insulin coverage    critical care polyneuropathy   Anemia of Acute blood Loss   severe protein caloric malnutrition   S/P Small bowel bleeding   S/P blood and FFP transfusion   Chronic kidney disease stage III  refuse NGT feeding , trial with Suresh tube feeding   GI prophylaxis with PPI

## 2021-08-17 NOTE — PROGRESS NOTE ADULT - PROBLEM SELECTOR PLAN 3
- unlikely related to low cardiac output  - GI input, RUQ sono 8/6 showed decompressed gallbladder and no focal fluid collection  - CT ABD 8/13 limited evaluation but concern severe stenosis of proximal SMA for which vascular Sx is following

## 2021-08-17 NOTE — PROGRESS NOTE ADULT - SUBJECTIVE AND OBJECTIVE BOX
Chief complaint  Patient is a 66y old  Male who presents with a chief complaint of Anemia, Supratherapeutic INR, Dark Stools (17 Aug 2021 14:05)   Review of systems  Patient in bed, looks comfortable, no hypoglycemic episodes.    Labs and Fingersticks  CAPILLARY BLOOD GLUCOSE  118 (17 Aug 2021 13:00)      POCT Blood Glucose.: 118 mg/dL (17 Aug 2021 13:35)    Medications  MEDICATIONS  (STANDING):  cefepime   IVPB      cefepime   IVPB 1000 milliGRAM(s) IV Intermittent every 8 hours  chlorhexidine 0.12% Liquid 15 milliLiter(s) Oral Mucosa every 12 hours  chlorhexidine 2% Cloths 1 Application(s) Topical <User Schedule>  clonazePAM  Tablet 0.25 milliGRAM(s) Oral at bedtime  dextrose 5% + sodium chloride 0.45%. 1000 milliLiter(s) (50 mL/Hr) IV Continuous <Continuous>  heparin   Injectable 5000 Unit(s) SubCutaneous every 8 hours  insulin lispro (ADMELOG) corrective regimen sliding scale   SubCutaneous every 6 hours  methimazole 10 milliGRAM(s) Oral every 8 hours  metoclopramide Injectable 10 milliGRAM(s) IV Push every 8 hours  mirtazapine 7.5 milliGRAM(s) Oral daily  pantoprazole  Injectable 40 milliGRAM(s) IV Push every 12 hours  propranolol 20 milliGRAM(s) Oral every 8 hours  vancomycin    Solution 125 milliGRAM(s) Oral every 6 hours      Physical Exam  General: Patient comfortable in bed  Vital Signs Last 12 Hrs  T(F): 97.7 (08-17-21 @ 08:00), Max: 97.7 (08-17-21 @ 08:00)  HR: 84 (08-17-21 @ 16:00) (84 - 112)  BP: --  BP(mean): --  RR: 12 (08-17-21 @ 16:00) (12 - 27)  SpO2: 100% (08-17-21 @ 16:00) (98% - 100%)  Neck: No palpable thyroid nodules.      Diagnostics    US Thyroid: Routine   Indication: hyperthyroidism  Transport: [facility]  w/ Monitor  Provider's Contact #: [phone] (08-08 @ 16:13)  TSH Receptor Antibody: AM Sched. Collection: 09-Aug-2021 06:00 (08-08 @ 13:31)  Thyroperoxidase Antibody: EVELINE Horton. Collection: 09-Aug-2021 06:00 (08-08 @ 13:31)           Chief complaint  Patient is a 66y old  Male who presents with a chief complaint of Anemia, Supratherapeutic INR, Dark Stools (17 Aug 2021 14:05)   Review of systems  Patient in bed, looks comfortable, no hypoglycemic episodes.    Labs and Fingersticks  CAPILLARY BLOOD GLUCOSE  118 (17 Aug 2021 13:00)      POCT Blood Glucose.: 118 mg/dL (17 Aug 2021 13:35)    Medications  MEDICATIONS  (STANDING):  cefepime   IVPB      cefepime   IVPB 1000 milliGRAM(s) IV Intermittent every 8 hours  chlorhexidine 0.12% Liquid 15 milliLiter(s) Oral Mucosa every 12 hours  chlorhexidine 2% Cloths 1 Application(s) Topical <User Schedule>  clonazePAM  Tablet 0.25 milliGRAM(s) Oral at bedtime  dextrose 5% + sodium chloride 0.45%. 1000 milliLiter(s) (50 mL/Hr) IV Continuous <Continuous>  heparin   Injectable 5000 Unit(s) SubCutaneous every 8 hours  insulin lispro (ADMELOG) corrective regimen sliding scale   SubCutaneous every 6 hours  methimazole 10 milliGRAM(s) Oral every 8 hours  metoclopramide Injectable 10 milliGRAM(s) IV Push every 8 hours  mirtazapine 7.5 milliGRAM(s) Oral daily  pantoprazole  Injectable 40 milliGRAM(s) IV Push every 12 hours  propranolol 20 milliGRAM(s) Oral every 8 hours  vancomycin    Solution 125 milliGRAM(s) Oral every 6 hours      Physical Exam  General: Patient comfortable in bed  Vital Signs Last 12 Hrs  T(F): 97.7 (08-17-21 @ 08:00), Max: 97.7 (08-17-21 @ 08:00)  HR: 84 (08-17-21 @ 16:00) (84 - 112)  BP: --  BP(mean): --  RR: 12 (08-17-21 @ 16:00) (12 - 27)  SpO2: 100% (08-17-21 @ 16:00) (98% - 100%)  Neck: No palpable thyroid nodules.      Diagnostics    US Thyroid: Routine   Indication: hyperthyroidism  Transport: [facility]  w/ Monitor  Provider's Contact #: [phone] (08-08 @ 16:13)  TSH Receptor Antibody: AM Sched. Collection: 09-Aug-2021 06:00 (08-08 @ 13:31)  Thyroperoxidase Antibody: EVELINE Horton. Collection: 09-Aug-2021 06:00 (08-08 @ 13:31)

## 2021-08-17 NOTE — PROGRESS NOTE ADULT - PROBLEM SELECTOR PLAN 1
- Continue home amiodarone 200 mg PO daily  - hemodynamically stable off   - titrate Nicardipine infusion, target MAP 70-80s, now improved

## 2021-08-17 NOTE — PROGRESS NOTE ADULT - PROBLEM SELECTOR PLAN 4
- serratia bacteremia and poss acalculous cholecystitis. B/c with gram + cocci and many enterobacter Carbapenem resistant strain  - sputum Cx 8/13 with enterobacter   - per ID, cefepime for 7 days, got vanc, getting level  - also on ppx PO Vancomycin   - further guidance by ID

## 2021-08-17 NOTE — PROGRESS NOTE ADULT - SUBJECTIVE AND OBJECTIVE BOX
RICKY HAMPTON  MRN-[id number]  Patient is a 66y old  Male who presents with a chief complaint of Anemia, Supratherapeutic INR, Dark Stools (16 Aug 2021 08:49)    HPI:  65M PMH ACC/AHA stage D HF due to NICM HM2 LVAD , TV annuloplasty ring 17 as destination therapy due to severe peripheral artery disease with significant stenosis  SIADH, Depression, CKD-3 with hyperkalemia, past E. coli UTIs, driveline drainage (21) and COVID-19 (back in 2020)  He was recently seen in clinic where he complained of abdominal pain and dark stools w constipation back in May. He presents to [facility] ER today weakness and fatigue, moderate and + Black stools for three days, on coumadin secondary to warfarin use in the setting of an LVAD. Patient has required transfusions for GIB in the past. Mostly recently back in 2021 pt had anemia with dark stools. No interventions was done at that time. However Last Endoscopy was done in 2020 (negative). Today labs show patient is anemic with H/H of 4.5/16.3,. INR is 8.84 MAP in the 90s, Temp 35.1. He denies any chest pain, shortness of breath, dizziness, abd pain, nausea or vomiting.       (2021 16:57)      Surgery/Hospital Course:   admit for melena w/ anemia, INR 8.84   6/15 Capsul study (+) for small bowel bleed, balloon endoscopy (old blood in prox ileum); post EGD - septic w/ L opacity, re-intubated for concern for aspiration, TTE (Mod MR, decrease biV w/ interventricular septum boweing towards R)   bronch    +C Diff    TC    CT C/A/P: Fluid filled colon which may be 2/2 rapid transit. Small bilateral pleffs with associates. Compressive atelectasis New ISABELLE & LLL  parenchymal opacities, suspicious for pneumonia. 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    #8 Shiley trach at bedside    CPAP trials    LVAD speed increased to 9200   Bronch; Central line dc'ed   TC since , continue as tolerated. Patient transferred to SDU.    Cont PT, no trach downsize today(#8cuffed)/ Anticoagulation/ Continue TF, speech f/u/ Vanco taper    oob to chair  INR  2.47  5 mg coumadin     increased lop to 25 q8  per HF   INR today 2.64.  H&H 7.3/24 this AM.  Will repeat CBC at noon, and will send stool guaiac Patient with persistent abdominal tenderness, rate of tube feeds decreased.  No nausea/vomiting.     INR today 2.4H&H 9.1.6 low flow overnight /N&V  NPO resolved for capsule study  Coumadin on hold refusing Tube feeds on D5 1/2 normal  @50 cc/hr   INR 2.69  H&H 7..1 refusing Tube feeds on D5 1/2 normal  @50 cc/hr. This am + BM Anusha Oneill HF  aware- PRBC x1  GI team consulted -  NPO  plan on study in am-  D/w Dr Cadet Patient  to return to CTU for further management; 1PRBCS    Post op INR 2.2 today.  No bleeding. BC + for SM.  Pt is hypotensive requiring pressor and inotropic support.  ID follow up today on Cefepime will follow.   R PTC for PTX    CT C/A/P: sub q emphysema in R chest wall, GGO RUL, small ascites CTH negative; Abd US: GB thickening, pericholecystic fluid     Perchole drain in place continues to drain total output overnight 133.  Fever today 38.8 given tylenol.  Will repeat BC now.     duplex LE negative    Patient with persistent abdominal pain, refusing tube feeds and medications, Psych consulted   CTA A/P ordered to r/o mesenteric ischemia 2/2 persistent anorexia, nausea, vomiting. Revealed:  Evaluation of the mesenteric vessels is limited by streak artifact from LVAD. There appears to be severe stenosis of the proximal SMA; abdominal mesenteric doppler is recommended for further evaluation. 2.  No small bowel findings to suggest acute mesenteric ischemia. 3.  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  8/15: Cultures resulted BC 1/2 +GPC in clusters, SC enterobacter.     Today:  Pt continue to not tolerate tube feeds.. Plan to mobilize as tolerated. TF restarted as tolerated. d/c vanco IV. c/w cefepime     REVIEW OF SYSTEMS:  Unable to obtain, pt is trached.     ICU Vital Signs Last 24 Hrs  T(C): 36.5 (17 Aug 2021 16:00), Max: 36.8 (16 Aug 2021 20:00)  T(F): 97.7 (17 Aug 2021 16:00), Max: 98.2 (16 Aug 2021 20:00)  HR: 81 (17 Aug 2021 17:00) (62 - 112)  BP: --  BP(mean): --  ABP: 78/66 (17 Aug 2021 17:00) (63/56 - 125/109)  ABP(mean): 70 (17 Aug 2021 17:00) (59 - 115)  RR: 18 (17 Aug 2021 17:00) (12 - 27)  SpO2: 100% (17 Aug 2021 17:00) (81% - 100%)        Physical Exam:  Gen:  awake and alert, NAD, Malnourished  CNS:  intact, nonfocal, responds to all commands  Neck: no JVD, +TC   RES : course breath sounds, no wheezing              CVS: +LVAD hum   Abd: Soft, minimally-moderately tender in RUQ by perc dawn site, NT everywhere else, positive BS throughout. Perc dawn drain site c/d/i draining bilious fluid.  Skin: No rash  Ext:  no edema    ============================I/O===========================  I&O's Detail    16 Aug 2021 07:01  -  17 Aug 2021 07:00  --------------------------------------------------------  IN:    dextrose 5% + sodium chloride 0.45%: 1200 mL    IV PiggyBack: 1257.4 mL    Miscellaneous Tube Feedin mL    NiCARdipine: 75 mL  Total IN: 2732.4 mL    OUT:    Drain (mL): 50 mL    Voided (mL): 2150 mL  Total OUT: 2200 mL    Total NET: 532.4 mL      17 Aug 2021 07:01  -  17 Aug 2021 17:42  --------------------------------------------------------  IN:    dextrose 5% + sodium chloride 0.45%: 500 mL    Enteral Tube Flush: 60 mL    IV PiggyBack: 350 mL    Miscellaneous Tube Feedin mL  Total IN: 960 mL    OUT:    Drain (mL): 100 mL    NiCARdipine: 0 mL    Voided (mL): 200 mL  Total OUT: 300 mL    Total NET: 660 mL         ============================ LABS =========================                       .  LABS:                         8.4    8.05  )-----------( 186      ( 17 Aug 2021 00:29 )             26.6     08-17    137  |  100  |  14  ----------------------------<  155<H>  3.7   |  24  |  0.49<L>    Ca    10.1      17 Aug 2021 00:29  Phos  2.3       Mg     1.8         TPro  7.8  /  Alb  3.2<L>  /  TBili  0.6  /  DBili  x   /  AST  23  /  ALT  45  /  AlkPhos  140<H>                RADIOLOGY, EKG & ADDITIONAL TESTS: Reviewed.       Urinalysis Basic - ( 14 Aug 2021 12:47 )    Color: Yellow / Appearance: Clear / S.041 / pH: x  Gluc: x / Ketone: Small  / Bili: Small / Urobili: Negative   Blood: x / Protein: 100 / Nitrite: Negative   Leuk Esterase: Negative / RBC: x / WBC x   Sq Epi: x / Non Sq Epi: x / Bacteria: x      ======================Micro/Rad/Cardio=================  Culture: Reviewed   CXR: Reviewed  Echo:Reviewed  ======================================================  PAST MEDICAL & SURGICAL HISTORY:  CHF (congestive heart failure)    CAD (coronary artery disease)    Depression    Pleural effusion    History of  novel coronavirus disease (COVID-19)  2020    Hemorrhoids    Bleeding hemorrhoids    Peripheral arterial disease    Claudication    BPH with urinary obstruction    ACC/AHA stage D systolic heart failure    Anticoagulation goal of INR 2.0 to 2.5    Falls    Clavicle fracture    CKD (chronic kidney disease), stage III    Iron deficiency anemia    H/O epistaxis    Vertigo    GI bleed    S/P TVR (tricuspid valve repair)    S/P ventricular assist device    S/P endoscopy      ====================ASSESSMENT ==============  Stage D Nonischemic Cardiomyopathy, Status Post HM2 on 2017    Cardiogenic shock  Hemodynamic instability   Acute hypoxemic respiratory failure  GI bleed , Status Post Enteroscopy   Anemia, in setting of melena   Chronic Kidney Disease  Stress hyperglycemia   C.diff positive on    Hypovolemic shock  Septic shock    Plan:  ====================== NEUROLOGY=====================  Nonfocal, continue to monitor neuro status   Tylenol PRN for analgesia   Clonazepam for anxiety/sleep regimen   C/w mirtazapine for depression    acetaminophen    Suspension .. 650 milliGRAM(s) Oral every 6 hours PRN Mild Pain (1 - 3)  clonazePAM  Tablet 0.25 milliGRAM(s) Oral at bedtime  mirtazapine 7.5 milliGRAM(s) Oral daily  ==================== RESPIRATORY======================  Acute hypoxemic respiratory failure s/p #8 shiley trach on ; patient put back on assist control on  2/2 worsening of clinical condition. Will attempt CPAP trach collar weaning today.  Requiring intermittent full vent support, continue close monitoring of respiratory rate and breathing pattern, following of ABG’s with A-line monitoring, continuous pulse oximetry monitoring.     ====================CARDIOVASCULAR==================  Stage D Nonischemic Cardiomyopathy, Status Post HM2 on 2017; LVAD settings 9200   TTE : reveals at least moderate MR, mild AI, severe global LV syst dysfxn, dec RV fxn   Continue invasive hemodynamic monitoring   Propranolol for rate control of HR 2/2 Hyperthyroidism, titrate as tolerated    propranolol 20 milliGRAM(s) Oral every 8 hours  ===================HEMATOLOGIC/ONC ===================  Acute blood loss anemia  Continue to monitor hemoglobin and hematocrit levels.   Continue Heparin for venous thromboembolism prophylaxis.   Continue to monitor LDH daily     heparin   Injectable 5000 Unit(s) SubCutaneous every 8 hours  ===================== RENAL =========================  Continue to monitor I/Os, BUN/Creatinine, and urine output.   Goal net negative fluid balance. Replete lytes PRN. Keep K> 4 and Mg >2.   ==================== GASTROINTESTINAL===================  CT A/P on : small ascites; ABD US showing GB wall thickening w/ surrounding pericholecystic fluid  S/p cholecystostomy tube placed on : with IR with -60cc of black bile, will monitor site closely.   Recent emesis on  in setting of abdominal pain, as per GI likely component of illeus given critical illness   CTAP on : no acute intra-abdominal pathology notes   Bowel regimen with Miralax and Senna   Zofran PRN for nausea     CTA A/P : Evaluation of the mesenteric vessels is limited by streak artifact from LVAD. There appears to be severe stenosis of the proximal SMA; abdominal mesenteric doppler is recommended for further evaluation. 2.  No small bowel findings to suggest acute mesenteric ischemia. 3.  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  Vascular surgery and IR consulted for potential SMA stent. Both teams state that SMA stent is not warranted as they don't believe SMA stenosis is as severe on CTA as read by radiology and do not believe that this stenosis is causing patient's intolerance to TFs.  Awaiting mesenteric duplex.  ?Palliative care f/u this week  Continue Protonix for stress ulcer prophylaxis.     dextrose 5% + sodium chloride 0.45%. 1000 milliLiter(s) (50 mL/Hr) IV Continuous <Continuous>  pantoprazole  Injectable 40 milliGRAM(s) IV Push every 12 hours  ondansetron Injectable 4 milliGRAM(s) IV Push every 6 hours PRN Nausea and/or Vomiting  metoclopramide Injectable 10 milliGRAM(s) IV Push every 8 hours  =======================    ENDOCRINE  =====================  Bgl controlled, monitor glucose for need to initiate sliding scale.    Thyroid US to evaluate for thyroid nodule/goiter in setting of hyperthyroid  - contraindicated at this time 2/2 trach  Methimazole restarted  ========================INFECTIOUS DISEASE================  Afebrile, Leukocytosis w/ WBC currently at 16.97  Cultures resulted BC 1/2 +GPC in clusters, SC enterobacter(CRE). C/w cefepime, and vancomycin     cefepime   IVPB      cefepime   IVPB 1000 milliGRAM(s) IV Intermittent every 8 hours  vancomycin    Solution 125 milliGRAM(s) Oral every 6 hours  vancomycin  IVPB 1000 milliGRAM(s) IV Intermittent every 12 hours    Patient requires continuous monitoring with bedside rhythm monitoring, pulse ox monitoring, and intermittent blood gas analysis. Care plan discussed with ICU care team. Patient remained critical and at risk for life threatening decompensation.

## 2021-08-17 NOTE — PROGRESS NOTE ADULT - SUBJECTIVE AND OBJECTIVE BOX
Subjective: Patient still having abdominal pain. He does not feel like going outside today.     Medications:  acetaminophen    Suspension .. 650 milliGRAM(s) Oral every 6 hours PRN  cefepime   IVPB      cefepime   IVPB 1000 milliGRAM(s) IV Intermittent every 8 hours  chlorhexidine 0.12% Liquid 15 milliLiter(s) Oral Mucosa every 12 hours  chlorhexidine 2% Cloths 1 Application(s) Topical <User Schedule>  clonazePAM  Tablet 0.25 milliGRAM(s) Oral at bedtime  dextrose 5% + sodium chloride 0.45%. 1000 milliLiter(s) IV Continuous <Continuous>  heparin   Injectable 5000 Unit(s) SubCutaneous every 8 hours  insulin lispro (ADMELOG) corrective regimen sliding scale   SubCutaneous every 6 hours  methimazole 10 milliGRAM(s) Oral every 8 hours  metoclopramide Injectable 10 milliGRAM(s) IV Push every 8 hours  mirtazapine 7.5 milliGRAM(s) Oral daily  ondansetron Injectable 4 milliGRAM(s) IV Push every 6 hours PRN  pantoprazole  Injectable 40 milliGRAM(s) IV Push every 12 hours  propranolol 20 milliGRAM(s) Oral every 8 hours  vancomycin    Solution 125 milliGRAM(s) Oral every 6 hours  vancomycin  IVPB 1000 milliGRAM(s) IV Intermittent every 12 hours      Vitals:  Vital Signs Last 24 Hrs  T(C): 36.5 (17 Aug 2021 08:00), Max: 36.8 (16 Aug 2021 20:00)  T(F): 97.7 (17 Aug 2021 08:00), Max: 98.2 (16 Aug 2021 20:00)  HR: 94 (17 Aug 2021 10:00) (62 - 112)  BP:   RR: 18 (17 Aug 2021 10:00) (18 - 38)  SpO2: 100% (17 Aug 2021 10:00) (81% - 100%)    Daily     Daily Weight in k.9 (17 Aug 2021 00:00)      I&O's Summary    16 Aug 2021 07:01  -  17 Aug 2021 07:00  --------------------------------------------------------  IN: 2732.4 mL / OUT: 2200 mL / NET: 532.4 mL    17 Aug 2021 07:01  -  17 Aug 2021 11:58  --------------------------------------------------------  IN: 450 mL / OUT: 100 mL / NET: 350 mL        Physical Exam:     General: No distress. Comfortable.  Neck: Neck supple. JVP not elevated. No masses  Chest: Clear to auscultation bilaterally  CV: Typical LVAD sounds. No palpable pulses  Abdomen: Soft, non-distended, non-tender  Extremities: no LE edema, warm and well perfused  Skin: No rashes or skin breakdown    LVAD Interrogation: Heart Mate 3  Speed: 9200  Flow: 5.5  Power: 5.6  PI: 5.8  Event: no events  No programming changes were made    Labs:                        8.4    8.05  )-----------( 186      ( 17 Aug 2021 00:29 )             26.6         137  |  100  |  14  ----------------------------<  155<H>  3.7   |  24  |  0.49<L>    Ca    10.1      17 Aug 2021 00:29  Phos  2.3       Mg     1.8         TPro  7.8  /  Alb  3.2<L>  /  TBili  0.6  /  DBili  x   /  AST  23  /  ALT  45  /  AlkPhos  140<H>                Lactate Dehydrogenase, Serum: 190 U/L ( @ 01:09)  Lactate Dehydrogenase, Serum: 184 U/L (08-15 @ 02:47)  Lactate Dehydrogenase, Serum: 192 U/L (08-15 @ 00:29)

## 2021-08-17 NOTE — PROGRESS NOTE ADULT - SUBJECTIVE AND OBJECTIVE BOX
RICKY JOINT  MRN#: [id number]  Subjective:  Pulmonary progress  : recurrent Acute hypoxic respiratory Failure ,aspiration pneumonia, NICM  , chart reviewed and H/O obtained radiological and Laboratory study reviewed patient Examined     65M PMH ACC/AHA stage D HF due to NICM HM2 LVAD , TV annuloplasty ring 17 as destination therapy due to severe peripheral artery disease with significant stenosis  SIADH, Depression, CKD-3 with hyperkalemia, past E. coli UTIs, driveline drainage (21) and COVID-19 (back in 2020)  He was recently seen in clinic where he complained of abdominal pain and dark stools w constipation back in May. He presents to [facility] ER today weakness and fatigue, moderate and + Black stools for three days, on coumadin secondary to warfarin use in the setting of an LVAD. Patient has required transfusions for GIB in the past. Mostly recently back in 2021 pt had anemia with dark stools. No interventions was done at that time. However Last Endoscopy was done in 2020 (negative). Today labs show patient is anemic with H/H of 4.5/16.3,. INR is 8.84 MAP in the 90s, Temp 35.1. He denies any chest pain, shortness of breath, dizziness, abd pain, nausea or vomiting. found to have  rectal bleeding underwent endoscopy ,old blood in the proximal ileum ,  develop sepsis with LL opacity given Antibiotics , Extubated , reintubated , Bronchoscopy on Zosyn for LL pneumonia  and Amiodarone S/P TV Annuloplasty , patient remain intubated on full ventilatory support .S/P multiple units of blood transfusion , remain on full ventilatory support on Precedex and propofol , new central IJ line , diarrhea C diff. +ve on po vancomycin and IV Flagyl,  mildly distended belly , fever start on cefepime 2gm q 8 hrs S/P tracheostomy .  new RT Subclavian central line continue on contact  isolation ,still diarrhea on C-diff antibiotics ENT follow up appreciated , trial of C-PAP as tolerated , , copious secretion from trach. site chest x ray left lower lobe pneumonia , tolerating trch. collar 50% FI02 still excessive secretion need pulmonary toilet , off Ancef antibiotic , no more diarrhea back on full support mechanical ventilator , chest x ray show improvement in LLL air space disease, more awake and responsive on tube feeding no more diarrhea , S/P  Ancef for bacteremia no fever ,ID follow up noted ,  no nausea or vomiting or diarrhea still very weak and tired , event note pulled NG tube now replaced , back on tube feeding ,still on po vancomycin , getting PT and OT at bed side , no plan for decannulation for now. , no more diarrhea receiving PT and OPT at bed side , minimal secretion from tracheostomy site , no SOB getting stronger , improve muscle tone patient transfer to monitor bed still on contact isolation for C-Difficel colitis on 50% FI02, NG tube clogged , and change to resume tube feeding still loose stool . H/H drop significantly require blood transfusion , most likely GI bleeding , IV heparin D/C , vomiting 200 cc of creamy color tube feeding on hold no sob, still melena monitor in the CTU possible capsule endoscopy , H/H is stable ., patient develop TR sided  pneumothorax require chest tube placement , RT IJ central line  placed , develop fever shaking chills , blood culture positive for serratia marcescens , start on cefepime .the patient  become hypoxic and hypotensive placed on full ventilatory support and Vasopressin , levophed and Dobutamine ,S/P blood transfusion on meropenem and vancomycin , IR note appreciated   , on and  off pressors , occasional agitation on Precedex .S/P IR cholecystostomy tube drainage placement in the RT upper Quadrant , resume anticoagulation chest x ray noted C-PAP trail lasted only for 2 hrs , new RT SC line and D/C RT IJ line , RT pig tail cathter has been removed , tolerating C-PAP trial placed on trach. collar 50% FI02 GI consultant noted on NG tube feeding as tolerated , develop AF RVR S/P  bolus Amiodarone  back to regular sinus Rhythm , Flat Affect depressed , back on tube feeding Vital AF at 60 cc/ hr .still intermittent abdominal pain , no fever saturation is accepted  back on full ventilatory support , tired and sleepy on round   Dark stool evaluated by Vascular service recommend mesenteric Duplex. new Rt IJ line , C-PAP trial to wean off ventilator to wean to trah.collar 50% FI02, again refuse NG tube feeding trial of  Suresh   tube feeding placement , no plan for TPN        (2021 16:57)    PAST MEDICAL & SURGICAL HISTORY:  CHF (congestive heart failure)    CAD (coronary artery disease)  Depression    Pleural effusion    History of 2019 novel coronavirus disease (COVID-19)  2020    Hemorrhoids    Bleeding hemorrhoids    Peripheral arterial disease    Claudication    BPH with urinary obstruction    ACC/AHA stage D systolic heart failure  Anticoagulation goal of INR 2.0 to 2.5    Falls    Clavicle fracture    CKD (chronic kidney disease), stage III    Iron deficiency anemia    H/O epistaxis    Vertigo    GI bleed    S/P TVR (tricuspid valve repair)    S/P ventricular assist device    S/P endoscopy    OBJECTIVE:  ICU Vital Signs Last 24 Hrs  T(C): 38.2 (2021 10:00), Max: 38.5 (2021 12:00)  T(F): 100.8 (2021 10:00), Max: 101.3 (2021 12:00)  HR: 65 (2021 10:00) (61 - 69)  BP: --  BP(mean): --  ABP: 105/67 (2021 10:00) (90/54 - 113/64)  ABP(mean): 77 (2021 10:00) (63 - 77)  RR: 20 (2021 10:00) (19 - 35)  SpO2: 99% (2021 10:00) (96% - 100%)       @ 07: @ 07:00  --------------------------------------------------------  IN: 2693.9 mL / OUT: 1415 mL / NET: 1278.9 mL     @ 07: @ 10:49  --------------------------------------------------------  IN: 420.8 mL / OUT: 115 mL / NET: 305.8 mL  PHYSICAL EXAM:Daily   Elderly male S/P tracheostomy on trach. collar 50% FI02 ,  Daily Weight in k.4 (2021 00:00)  HEENT:     + NCAT  + EOMI  - Conjuctival edema   - Icterus   - Thrush   - ETT  + NGT/OGT  Neck:         + FROM  RT IJ  line JVD  - Nodes - Masses + Mid-line trachea + Tracheostomy  Chest:            normal A-P diameter    Lungs:          + CTA   + Rhonchi    - Rales    - Wheezing + Decreased  LT BS   - Dullness R L  Cardiac:       + S1 + S2    + RRR   - Irregular   - S3  - S4    - Murmurs   - Rub   - Hamman’s sign   Abdomen:    + BS  + Soft + Non-tender - Distended - Organomegaly - PEG .cholecystostomy tube in place  Extremities:   - Cyanosis U/L   - Clubbing  U/L  + LE/UE Edema   + Capillary refill    + Pulses   Neuro:        - Awake   -  Alert   - Confused   - Lethargic   + Sedated  + Generalized Weakness  Skin:        - Rashes    - Erythema   + Normal incisions   + IV sites intact          HOSPITAL MEDICATIONS: All medications reviewed and analyzed  MEDICATIONS  (STANDING):  amiodarone    Tablet 200 milliGRAM(s) Oral daily  chlorhexidine 0.12% Liquid 15 milliLiter(s) Oral Mucosa every 12 hours  chlorhexidine 2% Cloths 1 Application(s) Topical <User Schedule>  dexmedetomidine Infusion 0.5 MICROgram(s)/kG/Hr (9.81 mL/Hr) IV Continuous <Continuous>  dextrose 50% Injectable 50 milliLiter(s) IV Push every 15 minutes  heparin  Infusion 400 Unit(s)/Hr (12.5 mL/Hr) IV Continuous <Continuous>  Hydromorphone  Injectable 0.5 milliGRAM(s) IV Push once  insulin lispro (ADMELOG) corrective regimen sliding scale   SubCutaneous every 6 hours  pantoprazole  Injectable 40 milliGRAM(s) IV Push every 12 hours  piperacillin/tazobactam IVPB.. 3.375 Gram(s) IV Intermittent every 8 hours  propofol Infusion 20 MICROgram(s)/kG/Min (9.42 mL/Hr) IV Continuous <Continuous>  sodium chloride 0.9% lock flush 3 milliLiter(s) IV Push every 8 hours  sodium chloride 0.9%. 1000 milliLiter(s) (10 mL/Hr) IV Continuous <Continuous>    MEDICATIONS  (PRN):  acetaminophen    Suspension .. 650 milliGRAM(s) Oral every 6 hours PRN Temp greater or equal to 38C (100.4F)    LABS: All Lab data reviewed and analyzed                        8.4    8.05  )-----------( 186      ( 17 Aug 2021 00:29 )             26.6    08-    137  |  100  |  14  ----------------------------<  155<H>  3.7   |  24  |  0.49<L>    Ca    10.1      17 Aug 2021 00:29  Phos  2.3     08-  Mg     1.8         TPro  7.8  /  Alb  3.2<L>  /  TBili  0.6  /  DBili  x   /  AST  23  /  ALT  45  /  AlkPhos  140<H>      Ca    9.7      16 Aug 2021 01:09  Phos  1.7     08-  Mg     1.7     -    TPro  8.3  /  Alb  3.4  /  TBili  0.7  /  DBili  x   /  AST  19  /  ALT  55<H>  /  AlkPhos  167<H>                                                                                         PTT - ( 2021 04:52 )  PTT:45.2 sec LIVER FUNCTIONS - ( 2021 00:42 )  Alb: 3.4 g/dL / Pro: 6.7 g/dL / ALK PHOS: 213 U/L / ALT: 15 U/L / AST: 24 U/L / GGT: x           RADIOLOGY: - Reviewed and analyzed RT Pig tail cathter  , LVAD HM2, CT scan of abdomen reviewed result noted

## 2021-08-17 NOTE — PROGRESS NOTE ADULT - ATTENDING COMMENTS
67 YO M with a history of stage D NICM s/p HM2 on 9/2017 as DT (due to severe PAD) with TV ring, prior COVID-19 infection 4/2020, recurrent syncope post LVAD s/p ILR, and chronic abdominal pain with prior negative workup who was admitted with symptomatic anemia with Hgb 4.5 His most recent complication is chronic abdominal pain which worsen with tube foods or food by mouth. When he was given TPN, he developed fevers so it was held      Making progress after a prolonged and difficult hospitalization.  Hgb stable, RASHAWN and infection resolved.  Now on treatment for hyperthyroidism with elevated TPO Ab level. Appreciate input from Endo.  OK to start propranolol from cardiac perspective.    LDH stable, off AC given prior GIB.  MAPs at goal.  Tachycardia may be due to hyperthyroidism. Will monitor. 67 YO M with a history of stage D NICM s/p HM2 on 9/2017 as DT (due to severe PAD) with TV ring, prior COVID-19 infection 4/2020, recurrent syncope post LVAD s/p ILR, and chronic abdominal pain with prior negative workup who was admitted with symptomatic anemia with Hgb 4.5 - His most recent complication is chronic abdominal pain which worsen with tube foods or food by mouth. Currently not a candidate for TPN as he had a positive blood culture and is now being treated for a 7 day course of antibitics    - Off AC due to GIB  -LDH stable  - MAPs at goal 65 YO M with a history of stage D NICM s/p HM2 on 9/2017 as DT (due to severe PAD) with TV ring, prior COVID-19 infection 4/2020, recurrent syncope post LVAD s/p ILR, and chronic abdominal pain with prior negative workup who was admitted with symptomatic anemia with Hgb 4.5 - - His most recent complication is chronic abdominal pain which worsen with tube foods or food by mouth. 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 I have spoken to him about possible PEG tube but he does not want to proceed with this option. TPN is not an option at this time since he had a positive blood culture and he is being treated for it, in the long run TPN without a clear end goal is not ideal. I would recommend consulting palliative care the address goals of care going forward  - Off AC due to GIB  -LDH stable  - MAPs at goal

## 2021-08-18 NOTE — PROGRESS NOTE ADULT - SUBJECTIVE AND OBJECTIVE BOX
Behavioral Cardiology Progress Note     History of present illness: Mr. Quispe is a 66 year-old man with history of NICM s/p HM2 on 9/2017 as DT (due to severe PAD) with TV ring, prior COVID-19 infection 4/2020, recurrent syncope post LVAD s/p ILR, and chronic abdominal pain with prior negative workup who was admitted with symptomatic anemia with Hgb 4.5 in setting of INR 8.8 without hemodynamic instability. Testing showed active bleeding in the small bowel but subsequent enteroscopy 6/15 did not reveal any active bleeding. He acutely decompensated after procedure with fever/hypertension, low flow alarms, and pulmonary infiltrate with hypoxia requiring intubation from probable aspiration PNA.  Course notable for inability to wean ventilator with persistent secretions for which he underwent tracheostomy as well as acute c diff colitis.     Social history: , lives in his sister’s house in [place]. Has 3 sons (2 live in , 1 in [facility]). One of 3 siblings. Lives in his sister’s house in [place] (Cristina Rudolph [phone]), also in the home are niece [facility] [phone]) and nephew (Miller Rudolph).  Another sister lives close by in [place] and all other siblings live in [facility] which the family visit often.  Worked full time at [employer] in [place], stopped working due to heart disease. Education: high school graduate.     Substance use:   Tobacco: Former smoker, 8-10 cigarettes/day for 30 years.   Alcohol: Past use of 3-4 drinks of Henessey 2x/week. None for past 4 years.   Drug: Denies any drug use.     Past psychiatric history: Denies     Mental status exam: Seen resting in bed on CTU with Trach collar. Awake, oriented x2-3 (did not identify date). Thought process goal oriented. No evidence of any psychosis, kenan, delusions. No SI/HI. Mood "good." Affect constricted. Denies SI/HI. Insight and judgment adequate.

## 2021-08-18 NOTE — PROGRESS NOTE ADULT - PROBLEM SELECTOR PLAN 1
Thyroid US pending.. Will continue medications for now, Must monitor LFTs closely while on Methimazole.  Will continue monitoring TFTs, labs, and FU.  Discussed plan with primary team.

## 2021-08-18 NOTE — PROGRESS NOTE ADULT - ASSESSMENT
Seen resting in bed in CTU with Trach collar. Speaking limited due to trach but able to communicate sufficiently by speaking over trach and mouthing words, using hand gestures, and nodding yes/no. Noted to have copious secretions that he was able to cough up adequately. Maintained good eye contact and was engaged in the assessment. Talked about the time he spent outside yesterday. Enjoyed experiencing the fresh air and breezes. He spent about 25 minutes total time outdoors. Talked about scheduling regular visits outside which he approved. Mood somewhat improved but still frustrated with extended hospitalization (adm 6/14) and feeling that he's not making progress. Working with PT and able to ambulate in [place]. Poor sleep at night but naps during daytime. Takes Klonapin at bedtime. Will speak with psychiatry about medication for sleep. Denies anhedonia. Denies SI/HI. Discussed other strategies for improving mood and motivation. Briefly met with [facility] RN yesterday. Somewhat open to meeting again. Continues to work with PT for building strength and stamina. Receptive to support, validation, and feedback.     Dx: Adjustment disorder with anxiety and depressed mood. F43.20 Systolic heart failure I50.2  LVAD Z95.811    Recommendations:   To promote sleep, keep lights and noise level to minimum during night  Engage with patient as often as possible throughout day to encourage communication  Behavioral Cardiology will follow      16 minutes spent on patient encounter   Jessica Hennessy, PhD  [phone]

## 2021-08-18 NOTE — PROGRESS NOTE ADULT - SUBJECTIVE AND OBJECTIVE BOX
[facility] NUTRITION SUPPORT-- FOLLOW UP NOTE  --------------------------------------------------------------------------------    24 hour events/subjective: pt seen and examined. rosalia rn, was receiving tf at 30cc overnight then stopped ~2100 as pt coughing/nauseous. pt oob in chair. c/o nausea and abd pain. denies vomiting. refusing peg per notes. afebrile overnight,  and 8/15 bld cxs ngtd. planned to restart tf today per ctu attg. restarted on methimazole yesterday.    Diet:  Diet, NPO with Tube Feed:   Tube Feeding Modality: Nasogastric  TwoCal HN (TWOCALHNRTH)  Total Volume for 24 Hours (mL): 240  Continuous  Starting Tube Feed Rate mL per Hour: 10  Until Goal Tube Feed Rate (mL per Hour): 10  Tube Feed Duration (in Hours): 24  Tube Feed Start Time: 13:25  No Carb Prosource (1pkg = 15gms Protein)     Qty per Day:  2 (21 @ 21:52)      PAST HISTORY  --------------------------------------------------------------------------------  No significant changes to PMH, PSH, FHx, SHx, unless otherwise noted    ALLERGIES & MEDICATIONS  --------------------------------------------------------------------------------  Allergies    No Known Allergies    Intolerances      Standing Inpatient Medications  cefepime   IVPB      cefepime   IVPB 1000 milliGRAM(s) IV Intermittent every 8 hours  chlorhexidine 0.12% Liquid 15 milliLiter(s) Oral Mucosa every 12 hours  chlorhexidine 2% Cloths 1 Application(s) Topical <User Schedule>  clonazePAM  Tablet 0.25 milliGRAM(s) Oral at bedtime  dextrose 5% + sodium chloride 0.45%. 1000 milliLiter(s) IV Continuous <Continuous>  heparin   Injectable 5000 Unit(s) SubCutaneous every 8 hours  insulin lispro (ADMELOG) corrective regimen sliding scale   SubCutaneous every 6 hours  methimazole 10 milliGRAM(s) Oral every 8 hours  metoclopramide Injectable 10 milliGRAM(s) IV Push every 8 hours  mirtazapine 7.5 milliGRAM(s) Oral daily  pantoprazole  Injectable 40 milliGRAM(s) IV Push every 12 hours  propranolol 20 milliGRAM(s) Oral every 8 hours  vancomycin    Solution 125 milliGRAM(s) Oral every 6 hours    PRN Inpatient Medications  acetaminophen    Suspension .. 650 milliGRAM(s) Oral every 6 hours PRN  ondansetron Injectable 4 milliGRAM(s) IV Push every 6 hours PRN        REVIEW OF SYSTEMS  --------------------------------------------------------------------------------    General:  as per hpi  HEENT:  no headaches, no vision changes, no ear pain  CV:  no chest pain, no palpitations  Resp:  see hpi  GI:  as per hpi  :  no pain, no bleeding, no dysuria  Muscle:  no pain, no weakness  Neuro:  no numbness, no tingling, no memory problems  Psych:  no insomnia  Endocrine:  no cold/heat intolerance  Heme: no ecchymosis, no easy bruising  Skin:  no rash, no edema    all other systems were reviewed and are negative, except as noted       VITALS/PHYSICAL EXAM  --------------------------------------------------------------------------------  T(C): 37.1 (21 @ 04:00), Max: 37.2 (21 @ 20:00)  HR: 88 (21 @ 07:00) (81 - 121)  BP: --  RR: 23 (21 @ 07:00) (10 - 40)  SpO2: 100% (21 @ 07:00) (93% - 100%)  Wt(kg): --      21 @ 07:01  -  21 @ 07:00  --------------------------------------------------------  IN: 1930 mL / OUT: 1495 mL / NET: 435 mL      I&O's Detail    17 Aug 2021 07:01  -  18 Aug 2021 07:00  --------------------------------------------------------  IN:    dextrose 5% + sodium chloride 0.45%: 1200 mL    Enteral Tube Flush: 60 mL    IV PiggyBack: 500 mL    Miscellaneous Tube Feedin mL  Total IN: 1930 mL    OUT:    Drain (mL): 320 mL    NiCARdipine: 0 mL    Voided (mL): 1175 mL  Total OUT: 1495 mL    Total NET: 435 mL          Physical Exam:  Gen: oob in chair, frail  HEENT: +trach +ngt   Chest: respirations non labored  Abd: soft nt nd perc c-tube in place   LE: no edema  Neuro: awake alert responsive    LABS/STUDIES  --------------------------------------------------------------------------------              7.8    12.13 >-----------<  194      [21 @ 00:31]              25.3     132  |  97  |  11  ----------------------------<  111      [21 @ 00:31]  3.7   |  25  |  0.52        Ca     9.4     [21 @ 00:31]      Mg     1.6     [21 @ 00:31]      Phos  2.8     [21 00:31]    TPro  7.0  /  Alb  3.0  /  TBili  0.5  /  DBili  x   /  AST  29  /  ALT  46  /  AlkPhos  164  [21 @ 00:31]              [21 @ 00:31]    Ca ionizedBlood Gas Arterial - Calcium, Ionized: 1.25 mmol/L (21 @ 00:29)  Blood Gas Arterial - Calcium, Ionized: 1.28 mmol/L (21 @ 14:44)    Creatinine Trend:  POC glucoseGlucose, Serum: 111 mg/dL (21 @ 00:31)  CAPILLARY BLOOD GLUCOSE  103 (17 Aug 2021 18:00)  118 (17 Aug 2021 13:00)      POCT Blood Glucose.: 106 mg/dL (18 Aug 2021 05:03)  POCT Blood Glucose.: 110 mg/dL (18 Aug 2021 00:24)  POCT Blood Glucose.: 102 mg/dL (17 Aug 2021 17:04)  POCT Blood Glucose.: 118 mg/dL (17 Aug 2021 13:35)    PrealbuminPrealbumin, Serum: 11 mg/dL (21 @ 04:01)  Prealbumin, Serum: 10 mg/dL (08-15-21 @ 13:53)    Triglycerides     [facility] NUTRITION SUPPORT-- FOLLOW UP NOTE  --------------------------------------------------------------------------------    24 hour events/subjective: pt seen and examined. rosalia rn, was receiving tf at 30cc overnight then stopped ~2100 as pt coughing/nauseous. pt oob in chair. c/o nausea and abd pain. denies vomiting. refusing peg per notes. afebrile overnight,  and 8/15 bld cxs ngtd. planned to restart tf today per ctu attg. restarted on methimazole yesterday. mvi and thiamine added today    Diet:  Diet, NPO with Tube Feed:   Tube Feeding Modality: Nasogastric  TwoCal HN (TWOCALHNRTH)  Total Volume for 24 Hours (mL): 240  Continuous  Starting Tube Feed Rate mL per Hour: 10  Until Goal Tube Feed Rate (mL per Hour): 10  Tube Feed Duration (in Hours): 24  Tube Feed Start Time: 13:25  No Carb Prosource (1pkg = 15gms Protein)     Qty per Day:  2 (21 @ 21:52)      PAST HISTORY  --------------------------------------------------------------------------------  No significant changes to PMH, PSH, FHx, SHx, unless otherwise noted    ALLERGIES & MEDICATIONS  --------------------------------------------------------------------------------  Allergies    No Known Allergies    Intolerances      Standing Inpatient Medications  cefepime   IVPB      cefepime   IVPB 1000 milliGRAM(s) IV Intermittent every 8 hours  chlorhexidine 0.12% Liquid 15 milliLiter(s) Oral Mucosa every 12 hours  chlorhexidine 2% Cloths 1 Application(s) Topical <User Schedule>  clonazePAM  Tablet 0.25 milliGRAM(s) Oral at bedtime  dextrose 5% + sodium chloride 0.45%. 1000 milliLiter(s) IV Continuous <Continuous>  heparin   Injectable 5000 Unit(s) SubCutaneous every 8 hours  insulin lispro (ADMELOG) corrective regimen sliding scale   SubCutaneous every 6 hours  methimazole 10 milliGRAM(s) Oral every 8 hours  metoclopramide Injectable 10 milliGRAM(s) IV Push every 8 hours  mirtazapine 7.5 milliGRAM(s) Oral daily  pantoprazole  Injectable 40 milliGRAM(s) IV Push every 12 hours  propranolol 20 milliGRAM(s) Oral every 8 hours  vancomycin    Solution 125 milliGRAM(s) Oral every 6 hours    PRN Inpatient Medications  acetaminophen    Suspension .. 650 milliGRAM(s) Oral every 6 hours PRN  ondansetron Injectable 4 milliGRAM(s) IV Push every 6 hours PRN        REVIEW OF SYSTEMS  --------------------------------------------------------------------------------    General:  as per hpi  HEENT:  no headaches, no vision changes, no ear pain  CV:  no chest pain, no palpitations  Resp:  see hpi  GI:  as per hpi  :  no pain, no bleeding, no dysuria  Muscle:  no pain, no weakness  Neuro:  no numbness, no tingling, no memory problems  Psych:  no insomnia  Endocrine:  no cold/heat intolerance  Heme: no ecchymosis, no easy bruising  Skin:  no rash, no edema    all other systems were reviewed and are negative, except as noted       VITALS/PHYSICAL EXAM  --------------------------------------------------------------------------------  T(C): 37.1 (21 @ 04:00), Max: 37.2 (21 @ 20:00)  HR: 88 (21 @ 07:00) (81 - 121)  BP: --  RR: 23 (21 @ 07:00) (10 - 40)  SpO2: 100% (21 @ 07:00) (93% - 100%)  Wt(kg): --      21 @ 07:01  -  21 @ 07:00  --------------------------------------------------------  IN: 1930 mL / OUT: 1495 mL / NET: 435 mL      I&O's Detail    17 Aug 2021 07:01  -  18 Aug 2021 07:00  --------------------------------------------------------  IN:    dextrose 5% + sodium chloride 0.45%: 1200 mL    Enteral Tube Flush: 60 mL    IV PiggyBack: 500 mL    Miscellaneous Tube Feedin mL  Total IN: 1930 mL    OUT:    Drain (mL): 320 mL    NiCARdipine: 0 mL    Voided (mL): 1175 mL  Total OUT: 1495 mL    Total NET: 435 mL          Physical Exam:  Gen: oob in chair, frail  HEENT: +trach +ngt   Chest: respirations non labored  Abd: soft nt nd perc c-tube in place   LE: no edema  Neuro: awake alert responsive    LABS/STUDIES  --------------------------------------------------------------------------------              7.8    12.13 >-----------<  194      [21 @ 00:31]              25.3     132  |  97  |  11  ----------------------------<  111      [21 @ 00:31]  3.7   |  25  |  0.52        Ca     9.4     [21 @ 00:31]      Mg     1.6     [21 @ 00:31]      Phos  2.8     [21 00:31]    TPro  7.0  /  Alb  3.0  /  TBili  0.5  /  DBili  x   /  AST  29  /  ALT  46  /  AlkPhos  164  [21 @ 00:31]              [21 @ 00:31]    Ca ionizedBlood Gas Arterial - Calcium, Ionized: 1.25 mmol/L (21 @ 00:29)  Blood Gas Arterial - Calcium, Ionized: 1.28 mmol/L (21 @ 14:44)    Creatinine Trend:  POC glucoseGlucose, Serum: 111 mg/dL (21 @ 00:31)  CAPILLARY BLOOD GLUCOSE  103 (17 Aug 2021 18:00)  118 (17 Aug 2021 13:00)      POCT Blood Glucose.: 106 mg/dL (18 Aug 2021 05:03)  POCT Blood Glucose.: 110 mg/dL (18 Aug 2021 00:24)  POCT Blood Glucose.: 102 mg/dL (17 Aug 2021 17:04)  POCT Blood Glucose.: 118 mg/dL (17 Aug 2021 13:35)    PrealbuminPrealbumin, Serum: 11 mg/dL (21 @ 04:01)  Prealbumin, Serum: 10 mg/dL (08-15-21 @ 13:53)    Triglycerides

## 2021-08-18 NOTE — PROGRESS NOTE ADULT - ASSESSMENT
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 coli UTIs, driveline drainage (1/7/21) and COVID-19, here initially for GIB prolonged hospital course, s/p tracheostomy, acalculous cholecystitis s/p perc dawn. Patient has persistent intermittent abdominal pain. Per CTU team, pt has recently been refusing tube feeds and is being evaluated for chronic mesenteric ischemia vs SMA syndrome.  8/13 bld cxs positive. Consulted for TPN. Prealb 11, prior a1c 5.6, tg 141. 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 Continues with intermittent abd pain and nausea.    -no plan to initiate TPN at this time, will cont to reassess risk/benefit profile   -tube feeds as tolerated as per CTU  -suspect uncontrolled hyperthyroidism contributing to metabolic issues; restarted on methimazole 8/17; endocrine following- repeat labs pending for am  -abdominal pain- w/u ongoing, gi/vascular/rheum input noted  -bacteremia- cont abx per id  -anemia - rec iron supplementation when feasible (hold in the setting of infection)  -hypokalemia/phosphatemia/magnesemia- repletions as per primary  -ethics consult pending to further delineate goals of care  -management per CTU, will follow and remain available as needed    plan dw with Dr. Soliz and Dr. IVAN Tang at length, agreeable with above    TPN pager [phone], spectra [phone]  M-F 6A-4P, Weekends and holidays 8A-12P   66M PMH ACC/AHA stage D HF due to NICM HM2 LVAD , TV annuloplasty ring 9/12/17 as destination therapy due to severe peripheral artery disease with significant stenosis  SIADH, Depression, CKD-3 with hyperkalemia, past E. coli UTIs, driveline drainage (1/7/21) and COVID-19, here initially for GIB prolonged hospital course, s/p tracheostomy, acalculous cholecystitis s/p perc dawn. Patient has persistent intermittent abdominal pain. Per CTU team, pt has recently been refusing tube feeds and is being evaluated for chronic mesenteric ischemia vs SMA syndrome.  8/13 bld cxs positive. Consulted for TPN. Prealb 11, prior a1c 5.6, tg 141. 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 Continues with intermittent abd pain and nausea.    -no plan to initiate TPN at this time, will cont to reassess risk/benefit profile   -tube feeds as tolerated as per CTU  -suspect uncontrolled hyperthyroidism contributing to metabolic issues; restarted on methimazole 8/17; endocrine following- repeat labs pending for am  -abdominal pain- w/u ongoing, gi/vascular/rheum input noted  -bacteremia- cont abx per id  -anemia - rec iron supplementation when feasible (hold in the setting of infection)  -hypokalemia/phosphatemia/magnesemia- repletions as per primary  -ethics consult/pall care f/u pending to further delineate goals of care  -management per CTU, will follow and remain available as needed    plan dw with Dr. Soliz and Dr. IVAN Tang at length, agreeable with above    TPN pager [phone], spectra [phone]  M-F 6A-4P, Weekends and holidays 8A-12P

## 2021-08-18 NOTE — CHART NOTE - NSCHARTNOTEFT_GEN_A_CORE
Nutrition Follow Up Note  Patient seen for: Malnutrition Follow Up     Chart reviewed, events noted. Noted pt now on trach collar since . Endocrine continues to follow and adjust insulin regimen as needed. Noted GI following, pt has complained of abdominal pain, previous emesis.     66M, PMH ACC/AHA stage D HF 2/2 NICM HM2 LVAD, TV annuloplasty ring 17 as destination therapy due to severe PAD with significant stenosis  SIADH, Depression, CKD-3, COVID-19, here initially for GIB prolonged hospital course, s/p tracheostomy, acalculous cholecystitis s/p perc dawn. Patient c persistent intermittent abdominal pain. Per Team, pt has recently been refusing tube feeds and is being evaluated for chronic mesenteric ischemia vs SMA syndrome. Tolerating intermittent TC.    Source: [] Patient       [x] EMR        [x] RN        [] Family at bedside       [] Other:    -If unable to interview patient: [] Trach/Vent/BiPAP  [] Disoriented/confused/inappropriate to interview    Diet, NPO with Tube Feed:   Tube Feeding Modality: Nasogastric  TwoCal HN (TWOCALHNRTH)  Total Volume for 24 Hours (mL): 240  Continuous  Starting Tube Feed Rate {mL per Hour}: 10  Until Goal Tube Feed Rate (mL per Hour): 10  Tube Feed Duration (in Hours): 24  Tube Feed Start Time: 13:25  No Carb Prosource (1pkg = 15gms Protein)     Qty per Day:  2 (21 @ 21:52)    Current EN regimen provides: 240ml formula, 480kcals, 20g protein  No Carb Prosource x2 provides additional 80kcals & 22g protein/day    Per RD note  "Pt has refused tube continuous tube feeds of Vital AF, Jevity 1.2 and bolus feeds of Vital AF. Yesterday pt tolerated a single bolus of 120 ml of Vital AF, but then refused any further feedings as they "make his stomach feel worse". Surgery following, no acute intervention required, surgery signed off. GI note indicates no further GI work up needed. Was also seen by psych on  for refusal of tube feeds."    Nutrition-related concerns:  - Pt meeting <30% nutritional needs in the last 4 days  - Discussion with Team during rounds this AM pt at risk for refeeding syndrome as he has continued to refuse TF; electrolytes noted to be replaced daily. PA notes will order Multivitamin and thiamin supplementation and continue with daily electrolyte monitoring. This AM K+, Phos, & Mg WNL. Noted pt also receiving Dex5 + NaCl  - Per Team, Pt refuses PEG; no plan to initiate TPN  - Continues with c/o nausea + abdominal pain  - Biliary drain in place; output: 150ml (8/15), 170ml (), 150ml (), 75ml (8/15)  - Noted pt is on Reglan     GI:  Last BM .   Bowel Regimen? [] Yes   [x] No    Weight history:   - Admission weight: 176.3 pounds / 80 kg (6/15)  significant wt loss noted     Weights:   Daily Weight in k.2 (), 65.9 (), 64.9 (), 62.1 (08-15), 63.6 (), 67.7 (), 71.2 ()    MEDICATIONS  (STANDING):  cefepime   IVPB  cefepime   IVPB  dextrose 5% + sodium chloride 0.45%.  insulin lispro (ADMELOG) corrective regimen sliding scale  methimazole  pantoprazole  Injectable  propranolol  vancomycin    Solution    Pertinent Labs:  @ 00:31: Na 132<L>, BUN 11, Cr 0.52, <H>, K+ 3.7, Phos 2.8, Mg 1.6, Alk Phos 164<H>, ALT/SGPT 46<H>, AST/SGOT 29, HbA1c --    A1C with Estimated Average Glucose Result: 5.4 % (08-15-21 @ 13:52)  A1C with Estimated Average Glucose Result: 5.6 % (06-15-21 @ 02:42)    Finger Sticks:  POCT Blood Glucose.: 106 mg/dL ( @ 05:03)  POCT Blood Glucose.: 110 mg/dL ( @ 00:24)  POCT Blood Glucose.: 102 mg/dL ( @ 17:04)  POCT Blood Glucose.: 118 mg/dL ( @ 13:35)    Triglycerides, Serum: 141 mg/dL (08-15-21 @ 13:53)  Triglycerides, Serum: 679 mg/dL (21 @ 13:17)    Skin per nursing documentation: no pressure injuries   Edema: none at this time     Estimated Nutrition Needs:   Using dosing weight 78.5 kg, considering vent support  Energy Needs (25-30 kcal/kg): 7553-9392 kcal/day  Protein Needs (1.2-1.4 g/kg):     Previous Nutrition Diagnosis: Severe chronic malnutrition  Nutrition diagnosis is ongoing & addressed with tube feeds as tolerated    No new nutrition diagnosis at this time      Recommendations:      - In setting of risk for refeeding syndrome add Multivitamin and thiamin supplementation daily; Continue to monitor electrolytes daily (specifically K, Mg, & Phos) and replenish aggressively.    Monitoring and Evaluation:   Continue to monitor Nutritional intake, Tolerance to diet prescription, weights, labs, skin integrity  RD remains available upon request and will follow up per protocol     Wendy Travis, MS, RD, CDN, [facility]  pager #[phone] Nutrition Follow Up Note  Patient seen for: Malnutrition Follow Up     Chart reviewed, events noted. Noted pt now on trach collar since . Endocrine continues to follow and adjust insulin regimen as needed. Noted GI following, pt has complained of abdominal pain, previous emesis.     66M, PMH ACC/AHA stage D HF 2/2 NICM HM2 LVAD, TV annuloplasty ring 17 as destination therapy due to severe PAD with significant stenosis  SIADH, Depression, CKD-3, COVID-19, here initially for GIB prolonged hospital course, s/p tracheostomy, acalculous cholecystitis s/p perc dawn. Patient c persistent intermittent abdominal pain. Per Team, pt has recently been refusing tube feeds and is being evaluated for chronic mesenteric ischemia vs SMA syndrome. Tolerating intermittent TC.    Source: EMR, Team    Diet, NPO with Tube Feed:   Tube Feeding Modality: Nasogastric  TwoCal HN (TWOCALHNRTH)  Total Volume for 24 Hours (mL): 240  Continuous  Starting Tube Feed Rate {mL per Hour}: 10  Until Goal Tube Feed Rate (mL per Hour): 10  Tube Feed Duration (in Hours): 24  Tube Feed Start Time: 13:25  No Carb Prosource (1pkg = 15gms Protein)     Qty per Day:  2 (21 @ 21:52)    Current EN regimen provides: 240ml formula, 480kcals, 20g protein  No Carb Prosource x2 provides additional 80kcals & 22g protein/day    Per RD note  "Pt has refused tube continuous tube feeds of Vital AF, Jevity 1.2 and bolus feeds of Vital AF. Yesterday pt tolerated a single bolus of 120 ml of Vital AF, but then refused any further feedings as they "make his stomach feel worse". Surgery following, no acute intervention required, surgery signed off. GI note indicates no further GI work up needed. Was also seen by psych on  for refusal of tube feeds."    Nutrition-related concerns:  - Pt meeting <30% nutritional needs in the last 4 days  - Discussion with Team during rounds this AM pt at risk for refeeding syndrome as he has continued to refuse TF; electrolytes noted to be replaced daily. PA notes will order Multivitamin and thiamin supplementation and continue with daily electrolyte monitoring. This AM K+, Phos, & Mg WNL. Noted pt also receiving Dex5 + NaCl  - Per Team, Pt refuses PEG; no plan to initiate TPN  - Continues with c/o nausea + abdominal pain  - Biliary drain in place; output: 150ml (8/15), 170ml (), 150ml (), 75ml (8/15)  - Noted pt is on Reglan     GI:  Last BM .   Bowel Regimen? [] Yes   [x] No    Weight history:   - Admission weight: 176.3 pounds / 80 kg (6/15)  significant wt loss noted     Weights:   Daily Weight in k.2 (), 65.9 (), 64.9 (), 62.1 (08-15), 63.6 (), 67.7 (), 71.2 ()    MEDICATIONS  (STANDING):  cefepime   IVPB  cefepime   IVPB  dextrose 5% + sodium chloride 0.45%.  insulin lispro (ADMELOG) corrective regimen sliding scale  methimazole  pantoprazole  Injectable  propranolol  vancomycin    Solution    Pertinent Labs:  @ 00:31: Na 132<L>, BUN 11, Cr 0.52, <H>, K+ 3.7, Phos 2.8, Mg 1.6, Alk Phos 164<H>, ALT/SGPT 46<H>, AST/SGOT 29, HbA1c --    A1C with Estimated Average Glucose Result: 5.4 % (08-15-21 @ 13:52)  A1C with Estimated Average Glucose Result: 5.6 % (06-15-21 @ 02:42)    Finger Sticks:  POCT Blood Glucose.: 106 mg/dL ( @ 05:03)  POCT Blood Glucose.: 110 mg/dL ( @ 00:24)  POCT Blood Glucose.: 102 mg/dL ( @ 17:04)  POCT Blood Glucose.: 118 mg/dL ( @ 13:35)    Triglycerides, Serum: 141 mg/dL (08-15-21 @ 13:53)  Triglycerides, Serum: 679 mg/dL (21 @ 13:17)    Skin per nursing documentation: no pressure injuries   Edema: none at this time     Estimated Nutrition Needs:   Using dosing weight 78.5 kg, considering vent support  Energy Needs (25-30 kcal/kg): 5291-8892 kcal/day  Protein Needs (1.2-1.4 g/kg):     Previous Nutrition Diagnosis: Severe chronic malnutrition  Nutrition diagnosis is ongoing & addressed with tube feeds as tolerated    No new nutrition diagnosis at this time      Recommendations:    1. In setting of risk for refeeding syndrome add Multivitamin and thiamin supplementation daily; Continue to monitor electrolytes daily (specifically K, Mg, & Phos) and replenish aggressively.  2. Recommend slowly increasing TwoCal (replenishing electrolytes PRN) to goal rate of 45ml/hr x 24hrs. At goal to provide 1080ml formula, 2160kcals, 91g protein, and 756ml free H2O (meets 27.5kcals/kg & ~1.2g protein/kg based on dosing wt 78.5kg).  3. RD to remain available to adjust EN formulary, volume/rate PRN.     Monitoring and Evaluation:   Continue to monitor Nutritional intake, Tolerance to diet prescription, weights, labs, skin integrity  RD remains available upon request and will follow up per protocol     Wendy Travis, MS, RD, CDN, [facility]  pager #[phone] Nutrition Follow Up Note  Patient seen for: Malnutrition Follow Up     Chart reviewed, events noted. Noted pt now on trach collar since . Endocrine continues to follow and adjust insulin regimen as needed. Noted GI following, pt has complained of abdominal pain, previous emesis.     66M, PMH ACC/AHA stage D HF 2/2 NICM HM2 LVAD, TV annuloplasty ring 17 as destination therapy due to severe PAD with significant stenosis  SIADH, Depression, CKD-3, COVID-19, here initially for GIB prolonged hospital course, s/p tracheostomy, acalculous cholecystitis s/p perc dawn. Patient c persistent intermittent abdominal pain. Per Team, pt has recently been refusing tube feeds and is being evaluated for chronic mesenteric ischemia vs SMA syndrome. Tolerating intermittent TC.    Source: EMR, Team    Diet, NPO with Tube Feed:   Tube Feeding Modality: Nasogastric  TwoCal HN (TWOCALHNRTH)  Total Volume for 24 Hours (mL): 240  Continuous  Starting Tube Feed Rate {mL per Hour}: 10  Until Goal Tube Feed Rate (mL per Hour): 10  Tube Feed Duration (in Hours): 24  Tube Feed Start Time: 13:25  No Carb Prosource (1pkg = 15gms Protein)     Qty per Day:  2 (21 @ 21:52)    Current EN regimen provides: 240ml formula, 480kcals, 20g protein  No Carb Prosource x2 provides additional 80kcals & 22g protein/day    Per RD note  "Pt has refused tube continuous tube feeds of Vital AF, Jevity 1.2 and bolus feeds of Vital AF. Yesterday pt tolerated a single bolus of 120 ml of Vital AF, but then refused any further feedings as they "make his stomach feel worse". Surgery following, no acute intervention required, surgery signed off. GI note indicates no further GI work up needed. Was also seen by psych on  for refusal of tube feeds."    Nutrition-related concerns:  - Pt meeting <30% nutritional needs in the last 4 days  - Discussion with Team during rounds this AM pt at risk for refeeding syndrome as he has continued to refuse TF; electrolytes noted to be replaced daily. PA notes will order Multivitamin and thiamin supplementation and continue with daily electrolyte monitoring. This AM K+, Phos, & Mg WNL. Noted pt also receiving Dex5 + NaCl  - Per Team, Pt refuses PEG; no plan to initiate TPN  - Continues with c/o nausea + abdominal pain  - Biliary drain in place; output: 150ml (8/15), 170ml (), 150ml (), 75ml (8/15)  - Noted pt is on Reglan     GI:  Last BM .   Bowel Regimen? [] Yes   [x] No    Weight history:   - Admission weight: 176.3 pounds / 80 kg (6/15)  significant wt loss noted     Weights:   Daily Weight in k.2 (), 65.9 (), 64.9 (), 62.1 (08-15), 63.6 (), 67.7 (), 71.2 ()    MEDICATIONS  (STANDING):  cefepime   IVPB  cefepime   IVPB  dextrose 5% + sodium chloride 0.45%.  insulin lispro (ADMELOG) corrective regimen sliding scale  methimazole  pantoprazole  Injectable  propranolol  vancomycin    Solution    Pertinent Labs:  @ 00:31: Na 132<L>, BUN 11, Cr 0.52, <H>, K+ 3.7, Phos 2.8, Mg 1.6, Alk Phos 164<H>, ALT/SGPT 46<H>, AST/SGOT 29, HbA1c --    A1C with Estimated Average Glucose Result: 5.4 % (08-15-21 @ 13:52)  A1C with Estimated Average Glucose Result: 5.6 % (06-15-21 @ 02:42)    Finger Sticks:  POCT Blood Glucose.: 106 mg/dL ( @ 05:03)  POCT Blood Glucose.: 110 mg/dL ( @ 00:24)  POCT Blood Glucose.: 102 mg/dL ( @ 17:04)  POCT Blood Glucose.: 118 mg/dL ( @ 13:35)    Triglycerides, Serum: 141 mg/dL (08-15-21 @ 13:53)  Triglycerides, Serum: 679 mg/dL (21 @ 13:17)    Skin per nursing documentation: no pressure injuries   Edema: none at this time     Estimated Nutrition Needs:   Using dosing weight 78.5 kg, considering vent support  Energy Needs (25-30 kcal/kg): 7147-6472 kcal/day  Protein Needs (1.2-1.4 g/kg):     Previous Nutrition Diagnosis: Severe chronic malnutrition  Nutrition diagnosis is ongoing & addressed with tube feeds as tolerated    No new nutrition diagnosis at this time      Recommendations:    1. In setting of risk for refeeding syndrome add Multivitamin and thiamin supplementation daily; Continue to monitor electrolytes daily (specifically K, Mg, & Phos) and replenish aggressively.  2. Recommend slowly increasing TwoCal (replenishing electrolytes PRN) to goal rate of 45ml/hr x 24hrs. At goal to provide 1080ml formula, 2160kcals, 91g protein, and 756ml free H2O (meets 27.5kcals/kg & ~1.2g protein/kg based on dosing wt 78.5kg).  - Discontinue No Carb Prosource  3. RD to remain available to adjust EN formulary, volume/rate PRN.     Monitoring and Evaluation:   Continue to monitor Nutritional intake, Tolerance to diet prescription, weights, labs, skin integrity  RD remains available upon request and will follow up per protocol     Wendy Travis, MS, RD, CDN, [facility]  pager #[phone]

## 2021-08-18 NOTE — PROGRESS NOTE ADULT - SUBJECTIVE AND OBJECTIVE BOX
Reports continued abdominal pain.     Vital Signs Last 24 Hrs  T(C): 36.7 (18 Aug 2021 08:00), Max: 37.2 (17 Aug 2021 20:00)  T(F): 98 (18 Aug 2021 08:00), Max: 99 (17 Aug 2021 20:00)  HR: 103 (18 Aug 2021 11:00) (81 - 121)  BP: --  BP(mean): --  RR: 32 (18 Aug 2021 11:00) (0 - 40)  SpO2: 93% (18 Aug 2021 11:00) (92% - 100%)    Gen: awake  Abd: soft, mild diffuse tenderness, nondistended    Mesenteric duplex reviewed. No stenosis in CA with borderline stenosis of proximal SMA.    < from: US Doppler Mesenteric (08.15.21 @ 13:17) >  Status post percutaneous cholecystostomy. No evidence of bile duct dilatation, collection or liver mass.    Doppler evaluation of the mesenteric arteries demonstrates borderline stenosis of the proximal superior mesenteric artery. No significant stenosis is seen in the celiac artery. The inferior mesenteric artery was not visualized.        < end of copied text >

## 2021-08-18 NOTE — PROGRESS NOTE ADULT - ASSESSMENT
No evidence of significant stenosis on mesenteric duplex.  No vascular intervention needed for sma stenosis.  Please call with any questions.

## 2021-08-18 NOTE — CHART NOTE - NSCHARTNOTEFT_GEN_A_CORE
Palliative reconsulted for goals of care  Reason for consult: patient refusing tube feeds.    Patient last seen by behavioral health on 8/9 for depression management  Per their note, patient with eyes closed, poor historian.    I highly recommend that psychiatry come back to assess capacity for medical decision making.  Capacity is situational, therefore clinical question is "Does the patient have capacity for decision making regarding artificial nutrition?"  In the event patient does NOT have capacity, then family needs to be involved. Patient has a HCP in chart, naming his sister.    If family opts for artificial nutrition, this would represent an ethical dilemma of "treatment over objection."    Until capacity is ascertained, having a family meeting will unlikely be helpful.   The above has been discussed with CTU SW.  [phone]

## 2021-08-18 NOTE — PROGRESS NOTE ADULT - SUBJECTIVE AND OBJECTIVE BOX
Chief complaint  Patient is a 66y old  Male who presents with a chief complaint of Anemia, Supratherapeutic INR, Dark Stools (18 Aug 2021 13:23)   Review of systems  Patient in bed, looks comfortable, no hypoglycemic episodes.    Labs and Fingersticks  CAPILLARY BLOOD GLUCOSE  103 (17 Aug 2021 18:00)      POCT Blood Glucose.: 103 mg/dL (18 Aug 2021 13:19)  POCT Blood Glucose.: 106 mg/dL (18 Aug 2021 05:03)  POCT Blood Glucose.: 110 mg/dL (18 Aug 2021 00:24)  POCT Blood Glucose.: 102 mg/dL (17 Aug 2021 17:04)      Anion Gap, Serum: 10 (08-18 @ 00:31)  Anion Gap, Serum: 13 (08-17 @ 00:29)      Calcium, Total Serum: 9.4 (08-18 @ 00:31)  Calcium, Total Serum: 10.1 (08-17 @ 00:29)  Albumin, Serum: 3.0 *L* (08-18 @ 00:31)  Albumin, Serum: 3.2 *L* (08-17 @ 00:29)    Alanine Aminotransferase (ALT/SGPT): 46 *H* (08-18 @ 00:31)  Alanine Aminotransferase (ALT/SGPT): 45 (08-17 @ 00:29)  Alkaline Phosphatase, Serum: 164 *H* (08-18 @ 00:31)  Alkaline Phosphatase, Serum: 140 *H* (08-17 @ 00:29)  Aspartate Aminotransferase (AST/SGOT): 29 (08-18 @ 00:31)  Aspartate Aminotransferase (AST/SGOT): 23 (08-17 @ 00:29)        08-18    132<L>  |  97  |  11  ----------------------------<  111<H>  3.7   |  25  |  0.52    Ca    9.4      18 Aug 2021 00:31  Phos  2.8     08-18  Mg     1.6     08-18    TPro  7.0  /  Alb  3.0<L>  /  TBili  0.5  /  DBili  x   /  AST  29  /  ALT  46<H>  /  AlkPhos  164<H>  08-18                        7.8    12.13 )-----------( 194      ( 18 Aug 2021 00:31 )             25.3     Medications  MEDICATIONS  (STANDING):  cefepime   IVPB      cefepime   IVPB 1000 milliGRAM(s) IV Intermittent every 8 hours  chlorhexidine 0.12% Liquid 15 milliLiter(s) Oral Mucosa every 12 hours  chlorhexidine 2% Cloths 1 Application(s) Topical <User Schedule>  clonazePAM  Tablet 0.25 milliGRAM(s) Oral at bedtime  dextrose 5% + sodium chloride 0.45%. 1000 milliLiter(s) (50 mL/Hr) IV Continuous <Continuous>  heparin   Injectable 5000 Unit(s) SubCutaneous every 8 hours  insulin lispro (ADMELOG) corrective regimen sliding scale   SubCutaneous every 6 hours  methimazole 10 milliGRAM(s) Oral every 8 hours  metoclopramide Injectable 10 milliGRAM(s) IV Push every 8 hours  mirtazapine 7.5 milliGRAM(s) Oral daily  multivitamin 1 Tablet(s) Oral daily  pantoprazole  Injectable 40 milliGRAM(s) IV Push every 12 hours  propranolol 20 milliGRAM(s) Oral every 8 hours  thiamine 100 milliGRAM(s) Oral daily  vancomycin    Solution 125 milliGRAM(s) Oral every 6 hours      Physical Exam  General: Patient comfortable in bed  Vital Signs Last 12 Hrs  T(F): 98.4 (08-18-21 @ 12:00), Max: 98.4 (08-18-21 @ 12:00)  HR: 114 (08-18-21 @ 16:00) (84 - 114)  BP: --  BP(mean): --  RR: 41 (08-18-21 @ 16:00) (0 - 41)  SpO2: 99% (08-18-21 @ 16:00) (92% - 100%)  Neck: No palpable thyroid nodules.  CVS: S1S2, No murmurs  Respiratory: No wheezing, no crepitations  GI: Abdomen soft, bowel sounds positive  Musculoskeletal:  edema lower extremities.   Skin: No skin rashes, no ecchymosis    Diagnostics    US Thyroid: Routine   Indication: hyperthyroidism  Transport: Stretcher-Crib,  w/ Monitor  Provider's Contact #: [phone] (08-08 @ 16:13)  TSH Receptor Antibody: AM Sched. Collection: 09-Aug-2021 06:00 (08-08 @ 13:31)  Thyroperoxidase Antibody: AM Sched. Collection: 09-Aug-2021 06:00 (08-08 @ 13:31)

## 2021-08-18 NOTE — PROGRESS NOTE ADULT - SUBJECTIVE AND OBJECTIVE BOX
RICKY JOINT  MRN#: [id number]  Subjective:  Pulmonary progress  : recurrent Acute hypoxic respiratory Failure ,aspiration pneumonia, NICM  , chart reviewed and H/O obtained radiological and Laboratory study reviewed patient Examined     65M PMH ACC/AHA stage D HF due to NICM HM2 LVAD , TV annuloplasty ring 17 as destination therapy due to severe peripheral artery disease with significant stenosis  SIADH, Depression, CKD-3 with hyperkalemia, past E. coli UTIs, driveline drainage (21) and COVID-19 (back in 2020)  He was recently seen in clinic where he complained of abdominal pain and dark stools w constipation back in May. He presents to [facility] ER today weakness and fatigue, moderate and + Black stools for three days, on coumadin secondary to warfarin use in the setting of an LVAD. Patient has required transfusions for GIB in the past. Mostly recently back in 2021 pt had anemia with dark stools. No interventions was done at that time. However Last Endoscopy was done in 2020 (negative). Today labs show patient is anemic with H/H of 4.5/16.3,. INR is 8.84 MAP in the 90s, Temp 35.1. He denies any chest pain, shortness of breath, dizziness, abd pain, nausea or vomiting. found to have  rectal bleeding underwent endoscopy ,old blood in the proximal ileum ,  develop sepsis with LL opacity given Antibiotics , Extubated , reintubated , Bronchoscopy on Zosyn for LL pneumonia  and Amiodarone S/P TV Annuloplasty , patient remain intubated on full ventilatory support .S/P multiple units of blood transfusion , remain on full ventilatory support on Precedex and propofol , new central IJ line , diarrhea C diff. +ve on po vancomycin and IV Flagyl,  mildly distended belly , fever start on cefepime 2gm q 8 hrs S/P tracheostomy .  new RT Subclavian central line continue on contact  isolation ,still diarrhea on C-diff antibiotics ENT follow up appreciated , trial of C-PAP as tolerated , , copious secretion from trach. site chest x ray left lower lobe pneumonia , tolerating trch. collar 50% FI02 still excessive secretion need pulmonary toilet , off Ancef antibiotic , no more diarrhea back on full support mechanical ventilator , chest x ray show improvement in LLL air space disease, more awake and responsive on tube feeding no more diarrhea , S/P  Ancef for bacteremia no fever ,ID follow up noted ,  no nausea or vomiting or diarrhea still very weak and tired , event note pulled NG tube now replaced , back on tube feeding ,still on po vancomycin , getting PT and OT at bed side , no plan for decannulation for now. , no more diarrhea receiving PT and OPT at bed side , minimal secretion from tracheostomy site , no SOB getting stronger , improve muscle tone patient transfer to monitor bed still on contact isolation for C-Difficel colitis on 50% FI02, NG tube clogged , and change to resume tube feeding still loose stool . H/H drop significantly require blood transfusion , most likely GI bleeding , IV heparin D/C , vomiting 200 cc of creamy color tube feeding on hold no sob, still melena monitor in the CTU possible capsule endoscopy , H/H is stable ., patient develop TR sided  pneumothorax require chest tube placement , RT IJ central line  placed , develop fever shaking chills , blood culture positive for serratia marcescens , start on cefepime .the patient  become hypoxic and hypotensive placed on full ventilatory support and Vasopressin , levophed and Dobutamine ,S/P blood transfusion on meropenem and vancomycin , IR note appreciated   , on and  off pressors , occasional agitation on Precedex .S/P IR cholecystostomy tube drainage placement in the RT upper Quadrant , resume anticoagulation chest x ray noted C-PAP trail lasted only for 2 hrs , new RT SC line and D/C RT IJ line , RT pig tail cathter has been removed , tolerating C-PAP trial placed on trach. collar 50% FI02 GI consultant noted on NG tube feeding as tolerated , develop AF RVR S/P  bolus Amiodarone  back to regular sinus Rhythm , Flat Affect depressed , back on tube feeding Vital AF at 60 cc/ hr .still intermittent abdominal pain , no fever saturation is accepted  back on full ventilatory support , tired and sleepy on round   Dark stool evaluated by Vascular service recommend mesenteric Duplex. new Rt IJ line , C-PAP trial to wean off ventilator to wean to trah.collar 50% FI02, again refuse NG tube feeding , no plan for TPN , psychiatric evaluation for competence , palliative care note appreciated . vascular mesenteric Dopplex noted no evidence of severe  mesenteric arteries thrombosis .       (2021 16:57)    PAST MEDICAL & SURGICAL HISTORY:  CHF (congestive heart failure)    CAD (coronary artery disease)  Depression    Pleural effusion    History of 2019 novel coronavirus disease (COVID-19)  2020    Hemorrhoids    Bleeding hemorrhoids    Peripheral arterial disease    Claudication    BPH with urinary obstruction    ACC/AHA stage D systolic heart failure  Anticoagulation goal of INR 2.0 to 2.5    Falls    Clavicle fracture    CKD (chronic kidney disease), stage III    Iron deficiency anemia    H/O epistaxis    Vertigo    GI bleed    S/P TVR (tricuspid valve repair)    S/P ventricular assist device    S/P endoscopy    OBJECTIVE:  ICU Vital Signs Last 24 Hrs  T(C): 38.2 (2021 10:00), Max: 38.5 (2021 12:00)  T(F): 100.8 (2021 10:00), Max: 101.3 (2021 12:00)  HR: 65 (2021 10:00) (61 - 69)  BP: --  BP(mean): --  ABP: 105/67 (2021 10:00) (90/54 - 113/64)  ABP(mean): 77 (2021 10:00) (63 - 77)  RR: 20 (2021 10:00) (19 - 35)  SpO2: 99% (2021 10:00) (96% - 100%)       @ 07: @ 07:00  --------------------------------------------------------  IN: 2693.9 mL / OUT: 1415 mL / NET: 1278.9 mL     @ 07: @ 10:49  --------------------------------------------------------  IN: 420.8 mL / OUT: 115 mL / NET: 305.8 mL  PHYSICAL EXAM:Daily   Elderly male S/P tracheostomy on trach. collar 50% FI02 ,  Daily Weight in k.4 (2021 00:00)  HEENT:     + NCAT  + EOMI  - Conjuctival edema   - Icterus   - Thrush   - ETT  + NGT/OGT  Neck:         + FROM  RT IJ  line JVD  - Nodes - Masses + Mid-line trachea + Tracheostomy  Chest:            normal A-P diameter    Lungs:          + CTA   + Rhonchi    - Rales    - Wheezing + Decreased  LT BS   - Dullness R L  Cardiac:       + S1 + S2    + RRR   - Irregular   - S3  - S4    - Murmurs   - Rub   - Hamman’s sign   Abdomen:    + BS  + Soft + Non-tender - Distended - Organomegaly - PEG .cholecystostomy tube in place  Extremities:   - Cyanosis U/L   - Clubbing  U/L  + LE/UE Edema   + Capillary refill    + Pulses   Neuro:        - Awake   -  Alert   - Confused   - Lethargic   + Sedated  + Generalized Weakness  Skin:        - Rashes    - Erythema   + Normal incisions   + IV sites intact          HOSPITAL MEDICATIONS: All medications reviewed and analyzed  MEDICATIONS  (STANDING):  amiodarone    Tablet 200 milliGRAM(s) Oral daily  chlorhexidine 0.12% Liquid 15 milliLiter(s) Oral Mucosa every 12 hours  chlorhexidine 2% Cloths 1 Application(s) Topical <User Schedule>  dexmedetomidine Infusion 0.5 MICROgram(s)/kG/Hr (9.81 mL/Hr) IV Continuous <Continuous>  dextrose 50% Injectable 50 milliLiter(s) IV Push every 15 minutes  heparin  Infusion 400 Unit(s)/Hr (12.5 mL/Hr) IV Continuous <Continuous>  Hydromorphone  Injectable 0.5 milliGRAM(s) IV Push once  insulin lispro (ADMELOG) corrective regimen sliding scale   SubCutaneous every 6 hours  pantoprazole  Injectable 40 milliGRAM(s) IV Push every 12 hours  piperacillin/tazobactam IVPB.. 3.375 Gram(s) IV Intermittent every 8 hours  propofol Infusion 20 MICROgram(s)/kG/Min (9.42 mL/Hr) IV Continuous <Continuous>  sodium chloride 0.9% lock flush 3 milliLiter(s) IV Push every 8 hours  sodium chloride 0.9%. 1000 milliLiter(s) (10 mL/Hr) IV Continuous <Continuous>    MEDICATIONS  (PRN):  acetaminophen    Suspension .. 650 milliGRAM(s) Oral every 6 hours PRN Temp greater or equal to 38C (100.4F)    LABS: All Lab data reviewed and analyzed                        7.8    12.13 )-----------( 194      ( 18 Aug 2021 00:31 )             25.3               08-18    132<L>  |  97  |  11  ----------------------------<  111<H>  3.7   |  25  |  0.52    Ca    9.4      18 Aug 2021 00:31  Phos  2.8     08-18  Mg     1.6     08-    TPro  7.0  /  Alb  3.0<L>  /  TBili  0.5  /  DBili  x   /  AST  29  /  ALT  46<H>  /  AlkPhos  164<H>      Ca    10.1      17 Aug 2021 00:29  Phos  2.3     08-17  Mg     1.8     08-17    TPro  7.8  /  Alb  3.2<L>  /  TBili  0.6  /  DBili  x   /  AST  23  /  ALT  45  /  AlkPhos  140<H>      Ca    9.7      16 Aug 2021 01:09  Phos  1.7     08-16  Mg     1.7     -    TPro  8.3  /  Alb  3.4  /  TBili  0.7  /  DBili  x   /  AST  19  /  ALT  55<H>  /  AlkPhos  167<H>                                                                                         PTT - ( 2021 04:52 )  PTT:45.2 sec LIVER FUNCTIONS - ( 2021 00:42 )  Alb: 3.4 g/dL / Pro: 6.7 g/dL / ALK PHOS: 213 U/L / ALT: 15 U/L / AST: 24 U/L / GGT: x           RADIOLOGY: - Reviewed and analyzed RT Pig tail cathter  , LVAD HM2, CT scan of abdomen reviewed result noted

## 2021-08-18 NOTE — PROGRESS NOTE ADULT - ASSESSMENT
Assessment  Hyperthyroidism: 66y Male with no history thyroid disease, was not on any thyroid supplements, previously euthyroid (June 2021), was on amiodarone (last dose 8/7), now in hyperthyroid state, Hashimotos, started on Methimazole 10mg TID and propanolol for tachycardia, DC'd methimazole due to LFTs trending up, Methimazole restarted once LFTs improved, now trending up again. Patient without improvement in Free T4, thyroid US pending, remains on Prednisone.  Hyperglycemia: Patient in nondiabetic range, A1C 5.6%, now on insulin coverage, blood sugars in acceptable range, no hypoglycemias, on TFs.  CHF: on medications, stable, monitored.  Anemia: Monitored      Pavan Barone MD  Cell: [phone]  Office: [phone]

## 2021-08-18 NOTE — PROGRESS NOTE ADULT - SUBJECTIVE AND OBJECTIVE BOX
RICKY HAMPTON  MRN-[id number]  Patient is a 66y old  Male who presents with a chief complaint of Anemia, Supratherapeutic INR, Dark Stools (17 Aug 2021 17:40)    HPI:  65M PMH ACC/AHA stage D HF due to NICM HM2 LVAD , TV annuloplasty ring 17 as destination therapy due to severe peripheral artery disease with significant stenosis  SIADH, Depression, CKD-3 with hyperkalemia, past E. coli UTIs, driveline drainage (21) and COVID-19 (back in 2020)  He was recently seen in clinic where he complained of abdominal pain and dark stools w constipation back in May. He presents to [facility] ER today weakness and fatigue, moderate and + Black stools for three days, on coumadin secondary to warfarin use in the setting of an LVAD. Patient has required transfusions for GIB in the past. Mostly recently back in 2021 pt had anemia with dark stools. No interventions was done at that time. However Last Endoscopy was done in 2020 (negative). Today labs show patient is anemic with H/H of 4.5/16.3,. INR is 8.84 MAP in the 90s, Temp 35.1. He denies any chest pain, shortness of breath, dizziness, abd pain, nausea or vomiting.       (2021 16:57)      Surgery/Hospital Course:   admit for melena w/ anemia, INR 8.84   6/15 Capsul study (+) for small bowel bleed, balloon endoscopy (old blood in prox ileum); post EGD - septic w/ L opacity, re-intubated for concern for aspiration, TTE (Mod MR, decrease biV w/ interventricular septum boweing towards R)   bronch    +C Diff    TC    CT C/A/P: Fluid filled colon which may be 2/2 rapid transit. Small bilateral pleffs with associates. Compressive atelectasis New ISABELLE & LLL  parenchymal opacities, suspicious for pneumonia. 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    #8 Shiley trach at bedside    CPAP trials    LVAD speed increased to 9200   Bronch; Central line dc'ed   TC since , continue as tolerated. Patient transferred to SDU.    Cont PT, no trach downsize today(#8cuffed)/ Anticoagulation/ Continue TF, speech f/u/ Vanco taper    oob to chair  INR  2.47  5 mg coumadin     increased lop to 25 q8  per HF   INR today 2.64.  H&H 7.3 this AM.  Will repeat CBC at noon, and will send stool guaiac Patient with persistent abdominal tenderness, rate of tube feeds decreased.  No nausea/vomiting.     INR today 2.4H&H 9.1.6 low flow overnight /N&V  NPO resolved for capsule study  Coumadin on hold refusing Tube feeds on D5 1/2 normal  @50 cc/hr   INR 2.69  H&H 7..1 refusing Tube feeds on D5 1/2 normal  @50 cc/hr. This am + BM Anusha Oneill HF  aware- PRBC x1  GI team consulted -  NPO  plan on study in am-  D/w Dr Cadet Patient  to return to CTU for further management; 1PRBCS    Post op INR 2.2 today.  No bleeding. BC + for SM.  Pt is hypotensive requiring pressor and inotropic support.  ID follow up today on Cefepime will follow.   R PTC for PTX    CT C/A/P: sub q emphysema in R chest wall, GGO RUL, small ascites CTH negative; Abd US: GB thickening, pericholecystic fluid     Perchole drain in place continues to drain total output overnight 133.  Fever today 38.8 given tylenol.  Will repeat BC now.     duplex LE negative    Patient with persistent abdominal pain, refusing tube feeds and medications, Psych consulted   CTA A/P ordered to r/o mesenteric ischemia 2/2 persistent anorexia, nausea, vomiting. Revealed:  Evaluation of the mesenteric vessels is limited by streak artifact from LVAD. There appears to be severe stenosis of the proximal SMA; abdominal mesenteric doppler is recommended for further evaluation. 2.  No small bowel findings to suggest acute mesenteric ischemia. 3.  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  8/15: Cultures resulted BC 1/2 +GPC in clusters, SC enterobacter; mesenteric duplex: borderline stenosis of proximal SMA     Today:    REVIEW OF SYSTEMS:  Unable to obtain, pt is trached.     ICU Vital Signs Last 24 Hrs  T(C): 37.1 (18 Aug 2021 04:00), Max: 37.2 (17 Aug 2021 20:00)  T(F): 98.8 (18 Aug 2021 04:00), Max: 99 (17 Aug 2021 20:00)  HR: 88 (18 Aug 2021 07:00) (81 - 121)  BP: --  BP(mean): --  ABP: 87/70 (18 Aug 2021 07:00) (73/59 - 99/77)  ABP(mean): 77 (18 Aug 2021 07:00) (65 - 87)  RR: 23 (18 Aug 2021 07:00) (10 - 40)  SpO2: 100% (18 Aug 2021 07:00) (93% - 100%)      Physical Exam:  Gen:  awake and alert, NAD, Malnourished  CNS:  intact, nonfocal, responds to all commands  Neck: no JVD, +TC   RES : course breath sounds, no wheezing              CVS: +LVAD hum   Abd: Soft, minimally-moderately tender in RUQ by perc dawn site, NT everywhere else, positive BS throughout. Perc dawn drain site c/d/i draining bilious fluid.  Skin: No rash  Ext:  no edema  ============================I/O===========================   I&O's Detail    17 Aug 2021 07:01  -  18 Aug 2021 07:00  --------------------------------------------------------  IN:    dextrose 5% + sodium chloride 0.45%: 1200 mL    Enteral Tube Flush: 60 mL    IV PiggyBack: 500 mL    Miscellaneous Tube Feedin mL  Total IN: 1930 mL    OUT:    Drain (mL): 320 mL    NiCARdipine: 0 mL    Voided (mL): 1175 mL  Total OUT: 1495 mL    Total NET: 435 mL        ============================ LABS =========================                        7.8    12.13 )-----------( 194      ( 18 Aug 2021 00:31 )             25.3     08-18    132<L>  |  97  |  11  ----------------------------<  111<H>  3.7   |  25  |  0.52    Ca    9.4      18 Aug 2021 00:31  Phos  2.8     -18  Mg     1.6     18    TPro  7.0  /  Alb  3.0<L>  /  TBili  0.5  /  DBili  x   /  AST  29  /  ALT  46<H>  /  AlkPhos  164<H>  -18    LIVER FUNCTIONS - ( 18 Aug 2021 00:31 )  Alb: 3.0 g/dL / Pro: 7.0 g/dL / ALK PHOS: 164 U/L / ALT: 46 U/L / AST: 29 U/L / GGT: x             ABG - ( 18 Aug 2021 00:29 )  pH, Arterial: 7.39  pH, Blood: x     /  pCO2: 48    /  pO2: 175   / HCO3: 29    / Base Excess: 3.5   /  SaO2: 100.0               ======================Micro/Rad/Cardio=================  Culture: Reviewed   CXR: Reviewed  Echo:Reviewed  ======================================================  PAST MEDICAL & SURGICAL HISTORY:  CHF (congestive heart failure)    CAD (coronary artery disease)    Depression    Pleural effusion    History of 2019 novel coronavirus disease (COVID-19)  2020    Hemorrhoids    Bleeding hemorrhoids    Peripheral arterial disease    Claudication    BPH with urinary obstruction    ACC/AHA stage D systolic heart failure    Anticoagulation goal of INR 2.0 to 2.5    Falls    Clavicle fracture    CKD (chronic kidney disease), stage III    Iron deficiency anemia    H/O epistaxis    Vertigo    GI bleed    S/P TVR (tricuspid valve repair)    S/P ventricular assist device    S/P endoscopy      ====================ASSESSMENT ==============  Stage D Nonischemic Cardiomyopathy, Status Post HM2 on 2017    Cardiogenic shock  Hemodynamic instability   Acute hypoxemic respiratory failure  GI bleed , Status Post Enteroscopy   Anemia, in setting of melena   Chronic Kidney Disease  Stress hyperglycemia   C.diff positive on    Hypovolemic shock  Septic shock    Plan:  ====================== NEUROLOGY=====================  Nonfocal, continue to monitor neuro status   Tylenol PRN for analgesia   Clonazepam for anxiety/sleep regimen   C/w mirtazapine for depression    acetaminophen    Suspension .. 650 milliGRAM(s) Oral every 6 hours PRN Mild Pain (1 - 3)  clonazePAM  Tablet 0.25 milliGRAM(s) Oral at bedtime  mirtazapine 7.5 milliGRAM(s) Oral daily    ==================== RESPIRATORY======================  Acute hypoxemic respiratory failure s/p #8 shiley trach on ; patient put back on assist control on  2/2 worsening of clinical condition, continue TC as tolerated   Requiring intermittent full vent support, continue close monitoring of respiratory rate and breathing pattern, following of ABG’s with A-line monitoring, continuous pulse oximetry monitoring.     Mechanical Ventilation:  Mode: off    ====================CARDIOVASCULAR==================  Stage D Nonischemic Cardiomyopathy, Status Post HM2 on 2017; LVAD settings 9200, flow 4.8   TTE : reveals at least moderate MR, mild AI, severe global LV syst dysfxn, dec RV fxn   Continue invasive hemodynamic monitoring   Propranolol for rate control of HR 2/2 Hyperthyroidism, titrate as tolerated    propranolol 20 milliGRAM(s) Oral every 8 hours    ===================HEMATOLOGIC/ONC ===================  Acute blood loss anemia, monitor H&H/Plts    Continue monitor LDH daily     VTE prophylaxis, heparin   Injectable 5000 Unit(s) SubCutaneous every 8 hours    ===================== RENAL =========================  Continue monitoring urine output, I&OS, BUN/Cr   Replete lytes PRN. Keep K> 4 and Mg >2     ==================== GASTROINTESTINAL===================  S/p cholecystostomy tube placed on : with IR with -60cc of black bile, will monitor site closely.   Recent emesis on  in setting of abdominal pain, as per GI likely component of illeus given critical illness   CTA A/P : Evaluation of the mesenteric vessels is limited by streak artifact from LVAD. There appears to be severe stenosis of the proximal SMA; abdominal mesenteric doppler is recommended for further evaluation. 2.  No small bowel findings to suggest acute mesenteric ischemia. 3.  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  Vascular surgery and IR consulted for potential SMA stent. Both teams state that SMA stent is not warranted as they don't believe SMA stenosis is as severe on CTA as read by radiology and do not believe that this stenosis is causing patient's intolerance to TFs  Mesenteric duplex on 8/15 borderline stenosis of proximal SMA   ?Palliative care f/u this week  TwoCal HN feeds   Zofran PRN for nausea   Reglan for gut motility     dextrose 5% + sodium chloride 0.45%. 1000 milliLiter(s) (50 mL/Hr) IV Continuous <Continuous>  GI prophylaxis, pantoprazole  Injectable 40 milliGRAM(s) IV Push every 12 hours  ondansetron Injectable 4 milliGRAM(s) IV Push every 6 hours PRN Nausea and/or Vomiting  metoclopramide Injectable 10 milliGRAM(s) IV Push every 8 hours    =======================    ENDOCRINE  =====================  Stress hyperglycemia, continue glucose control with admelog sliding scale   Thyroid US to evaluate for thyroid nodule/goiter in setting of hyperthyroid  - contraindicated at this time 2/2 trach/will do when trach is out   C/w methimazole as per endocrinology     insulin lispro (ADMELOG) corrective regimen sliding scale   SubCutaneous every 6 hours  methimazole 10 milliGRAM(s) Oral every 8 hours    ========================INFECTIOUS DISEASE================  Afebrile, WBC rising 8.05->12.13   Continue trending WBC and monitoring fever curve   BC 1/2 +GPC in clusters, SC enterobacter(CRE). C/w cefepime and vancomycin     cefepime   IVPB 1000 milliGRAM(s) IV Intermittent every 8 hours  vancomycin    Solution 125 milliGRAM(s) Oral every 6 hours      Patient requires continuous monitoring with bedside rhythm monitoring, pulse ox monitoring, and intermittent blood gas analysis. Care plan discussed with ICU care team. Patient remained critical and at risk for life threatening decompensation.     By signing my name below, I, Agnieszka Cherry, attest that this documentation has been prepared under the direction and in the presence of CLAUDIA Lee   Electronically signed: Cesia Shaffer, 21 @ 08:18    I, Georgina Perez, personally performed the services described in this documentation. all medical record entries made by the luisibe were at my direction and in my presence. I have reviewed the chart and agree that the record reflects my personal performance and is accurate and complete  Electronically signed: CLAUDIA Lee        RICKY HAMPTON  MRN-[id number]  Patient is a 66y old  Male who presents with a chief complaint of Anemia, Supratherapeutic INR, Dark Stools (17 Aug 2021 17:40)    HPI:  65M PMH ACC/AHA stage D HF due to NICM HM2 LVAD , TV annuloplasty ring 17 as destination therapy due to severe peripheral artery disease with significant stenosis  SIADH, Depression, CKD-3 with hyperkalemia, past E. coli UTIs, driveline drainage (21) and COVID-19 (back in 2020)  He was recently seen in clinic where he complained of abdominal pain and dark stools w constipation back in May. He presents to [facility] ER today weakness and fatigue, moderate and + Black stools for three days, on coumadin secondary to warfarin use in the setting of an LVAD. Patient has required transfusions for GIB in the past. Mostly recently back in 2021 pt had anemia with dark stools. No interventions was done at that time. However Last Endoscopy was done in 2020 (negative). Today labs show patient is anemic with H/H of 4.5/16.3,. INR is 8.84 MAP in the 90s, Temp 35.1. He denies any chest pain, shortness of breath, dizziness, abd pain, nausea or vomiting.       (2021 16:57)      Surgery/Hospital Course:   admit for melena w/ anemia, INR 8.84   6/15 Capsul study (+) for small bowel bleed, balloon endoscopy (old blood in prox ileum); post EGD - septic w/ L opacity, re-intubated for concern for aspiration, TTE (Mod MR, decrease biV w/ interventricular septum boweing towards R)   bronch    +C Diff    TC    CT C/A/P: Fluid filled colon which may be 2/2 rapid transit. Small bilateral pleffs with associates. Compressive atelectasis New ISABELLE & LLL  parenchymal opacities, suspicious for pneumonia. 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    #8 Shiley trach at bedside    CPAP trials    LVAD speed increased to 9200   Bronch; Central line dc'ed   TC since , continue as tolerated. Patient transferred to SDU.    Cont PT, no trach downsize today(#8cuffed)/ Anticoagulation/ Continue TF, speech f/u/ Vanco taper    oob to chair  INR  2.47  5 mg coumadin     increased lop to 25 q8  per HF   INR today 2.64.  H&H 7.3 this AM.  Will repeat CBC at noon, and will send stool guaiac Patient with persistent abdominal tenderness, rate of tube feeds decreased.  No nausea/vomiting.     INR today 2.4H&H 9.1.6 low flow overnight /N&V  NPO resolved for capsule study  Coumadin on hold refusing Tube feeds on D5 1/2 normal  @50 cc/hr   INR 2.69  H&H 7..1 refusing Tube feeds on D5 1/2 normal  @50 cc/hr. This am + BM Anusha Oneill HF  aware- PRBC x1  GI team consulted -  NPO  plan on study in am-  D/w Dr Cadet Patient  to return to CTU for further management; 1PRBCS    Post op INR 2.2 today.  No bleeding. BC + for SM.  Pt is hypotensive requiring pressor and inotropic support.  ID follow up today on Cefepime will follow.   R PTC for PTX    CT C/A/P: sub q emphysema in R chest wall, GGO RUL, small ascites CTH negative; Abd US: GB thickening, pericholecystic fluid     Perchole drain in place continues to drain total output overnight 133.  Fever today 38.8 given tylenol.  Will repeat BC now.     duplex LE negative    Patient with persistent abdominal pain, refusing tube feeds and medications, Psych consulted   CTA A/P ordered to r/o mesenteric ischemia 2/2 persistent anorexia, nausea, vomiting. Revealed:  Evaluation of the mesenteric vessels is limited by streak artifact from LVAD. There appears to be severe stenosis of the proximal SMA; abdominal mesenteric doppler is recommended for further evaluation. 2.  No small bowel findings to suggest acute mesenteric ischemia. 3.  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  8/15: Cultures resulted BC 1/2 +GPC in clusters, SC enterobacter; mesenteric duplex: borderline stenosis of proximal SMA     Today:  Continue TC as tolerated. Ambulate as tolerated.     REVIEW OF SYSTEMS:  Unable to obtain, pt is trached.     ICU Vital Signs Last 24 Hrs  T(C): 37.1 (18 Aug 2021 04:00), Max: 37.2 (17 Aug 2021 20:00)  T(F): 98.8 (18 Aug 2021 04:00), Max: 99 (17 Aug 2021 20:00)  HR: 88 (18 Aug 2021 07:00) (81 - 121)  BP: --  BP(mean): --  ABP: 87/70 (18 Aug 2021 07:00) (73/59 - 99/77)  ABP(mean): 77 (18 Aug 2021 07:00) (65 - 87)  RR: 23 (18 Aug 2021 07:00) (10 - 40)  SpO2: 100% (18 Aug 2021 07:00) (93% - 100%)      Physical Exam:  Gen:  awake and alert, NAD, Malnourished  CNS:  intact, nonfocal, responds to all commands  Neck: no JVD, +TC   RES : course breath sounds, no wheezing              CVS: +LVAD hum   Abd: Soft, minimally-moderately tender in RUQ by perc dawn site, NT everywhere else, positive BS throughout. Perc dawn drain site c/d/i draining bilious fluid.  Skin: No rash  Ext:  no edema  ============================I/O===========================   I&O's Detail    17 Aug 2021 07:01  -  18 Aug 2021 07:00  --------------------------------------------------------  IN:    dextrose 5% + sodium chloride 0.45%: 1200 mL    Enteral Tube Flush: 60 mL    IV PiggyBack: 500 mL    Miscellaneous Tube Feedin mL  Total IN: 1930 mL    OUT:    Drain (mL): 320 mL    NiCARdipine: 0 mL    Voided (mL): 1175 mL  Total OUT: 1495 mL    Total NET: 435 mL        ============================ LABS =========================                        7.8    12.13 )-----------( 194      ( 18 Aug 2021 00:31 )             25.3     08-18    132<L>  |  97  |  11  ----------------------------<  111<H>  3.7   |  25  |  0.52    Ca    9.4      18 Aug 2021 00:31  Phos  2.8     18  Mg     1.6     18    TPro  7.0  /  Alb  3.0<L>  /  TBili  0.5  /  DBili  x   /  AST  29  /  ALT  46<H>  /  AlkPhos  164<H>  18    LIVER FUNCTIONS - ( 18 Aug 2021 00:31 )  Alb: 3.0 g/dL / Pro: 7.0 g/dL / ALK PHOS: 164 U/L / ALT: 46 U/L / AST: 29 U/L / GGT: x             ABG - ( 18 Aug 2021 00:29 )  pH, Arterial: 7.39  pH, Blood: x     /  pCO2: 48    /  pO2: 175   / HCO3: 29    / Base Excess: 3.5   /  SaO2: 100.0               ======================Micro/Rad/Cardio=================  Culture: Reviewed   CXR: Reviewed  Echo:Reviewed  ======================================================  PAST MEDICAL & SURGICAL HISTORY:  CHF (congestive heart failure)    CAD (coronary artery disease)    Depression    Pleural effusion    History of 2019 novel coronavirus disease (COVID-19)  2020    Hemorrhoids    Bleeding hemorrhoids    Peripheral arterial disease    Claudication    BPH with urinary obstruction    ACC/AHA stage D systolic heart failure    Anticoagulation goal of INR 2.0 to 2.5    Falls    Clavicle fracture    CKD (chronic kidney disease), stage III    Iron deficiency anemia    H/O epistaxis    Vertigo    GI bleed    S/P TVR (tricuspid valve repair)    S/P ventricular assist device    S/P endoscopy      ====================ASSESSMENT ==============  Stage D Nonischemic Cardiomyopathy, Status Post HM2 on 2017    Cardiogenic shock  Hemodynamic instability   Acute hypoxemic respiratory failure  GI bleed , Status Post Enteroscopy   Anemia, in setting of melena   Chronic Kidney Disease  Stress hyperglycemia   C.diff positive on    Hypovolemic shock  Septic shock    Plan:  ====================== NEUROLOGY=====================  Nonfocal, continue to monitor neuro status   Tylenol PRN for analgesia   Clonazepam for anxiety/sleep regimen   C/w mirtazapine for depression  Ambulate as tolerated     acetaminophen    Suspension .. 650 milliGRAM(s) Oral every 6 hours PRN Mild Pain (1 - 3)  clonazePAM  Tablet 0.25 milliGRAM(s) Oral at bedtime  mirtazapine 7.5 milliGRAM(s) Oral daily    ==================== RESPIRATORY======================  Acute hypoxemic respiratory failure s/p #8 shiley trach on ; patient put back on assist control on  2/2 worsening of clinical condition, continue TC as tolerated   Requiring intermittent full vent support, continue close monitoring of respiratory rate and breathing pattern, following of ABG’s with A-line monitoring, continuous pulse oximetry monitoring.     Mechanical Ventilation:  Mode: off    ====================CARDIOVASCULAR==================  Stage D Nonischemic Cardiomyopathy, Status Post HM2 on 2017; LVAD settings 9200, flow 4.8   TTE : reveals at least moderate MR, mild AI, severe global LV syst dysfxn, dec RV fxn   Continue invasive hemodynamic monitoring   Propranolol for rate control of HR 2/2 Hyperthyroidism, titrate as tolerated    propranolol 20 milliGRAM(s) Oral every 8 hours    ===================HEMATOLOGIC/ONC ===================  Acute blood loss anemia, monitor H&H/Plts    Continue monitor LDH daily     VTE prophylaxis, heparin   Injectable 5000 Unit(s) SubCutaneous every 8 hours    ===================== RENAL =========================  Continue monitoring urine output, I&OS, BUN/Cr   Replete lytes PRN. Keep K> 4 and Mg >2     ==================== GASTROINTESTINAL===================  S/p cholecystostomy tube placed on : with IR with -60cc of black bile, will monitor site closely.   Recent emesis on  in setting of abdominal pain, as per GI likely component of illeus given critical illness   CTA A/P : Evaluation of the mesenteric vessels is limited by streak artifact from LVAD. There appears to be severe stenosis of the proximal SMA; abdominal mesenteric doppler is recommended for further evaluation. 2.  No small bowel findings to suggest acute mesenteric ischemia. 3.  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  Vascular surgery and IR consulted for potential SMA stent. Both teams state that SMA stent is not warranted as they don't believe SMA stenosis is as severe on CTA as read by radiology and do not believe that this stenosis is causing patient's intolerance to TFs  Mesenteric duplex on 8/15 borderline stenosis of proximal SMA   ?Palliative care f/u this week  TwoCal HN feeds   Zofran PRN for nausea   Reglan for gut motility     dextrose 5% + sodium chloride 0.45%. 1000 milliLiter(s) (50 mL/Hr) IV Continuous <Continuous>  GI prophylaxis, pantoprazole  Injectable 40 milliGRAM(s) IV Push every 12 hours  ondansetron Injectable 4 milliGRAM(s) IV Push every 6 hours PRN Nausea and/or Vomiting  metoclopramide Injectable 10 milliGRAM(s) IV Push every 8 hours    =======================    ENDOCRINE  =====================  Stress hyperglycemia, continue glucose control with admelog sliding scale   Thyroid US to evaluate for thyroid nodule/goiter in setting of hyperthyroid  - contraindicated at this time 2/2 trach/will do when trach is out   C/w methimazole, restarted yesterday for low TSH    insulin lispro (ADMELOG) corrective regimen sliding scale   SubCutaneous every 6 hours  methimazole 10 milliGRAM(s) Oral every 8 hours    ========================INFECTIOUS DISEASE================  Afebrile, WBC rising 8.05->12.13   Continue trending WBC and monitoring fever curve   BC 1/2 +GPC in clusters, SC enterobacter(CRE). C/w cefepime and vancomycin     cefepime   IVPB 1000 milliGRAM(s) IV Intermittent every 8 hours  vancomycin    Solution 125 milliGRAM(s) Oral every 6 hours      Patient requires continuous monitoring with bedside rhythm monitoring, pulse ox monitoring, and intermittent blood gas analysis. Care plan discussed with ICU care team. Patient remained critical and at risk for life threatening decompensation.     By signing my name below, IAgnieszka, attest that this documentation has been prepared under the direction and in the presence of CLAUDIA Lee   Electronically signed: Cesia Shaffer, 21 @ 08:18    I, Georgina Perez, personally performed the services described in this documentation. all medical record entries made by the luisibmel were at my direction and in my presence. I have reviewed the chart and agree that the record reflects my personal performance and is accurate and complete  Electronically signed: CLAUDIA Lee        RICKY HAMPTON  MRN-[id number]  Patient is a 66y old  Male who presents with a chief complaint of Anemia, Supratherapeutic INR, Dark Stools (17 Aug 2021 17:40)    HPI:  65M PMH ACC/AHA stage D HF due to NICM HM2 LVAD , TV annuloplasty ring 17 as destination therapy due to severe peripheral artery disease with significant stenosis  SIADH, Depression, CKD-3 with hyperkalemia, past E. coli UTIs, driveline drainage (21) and COVID-19 (back in 2020)  He was recently seen in clinic where he complained of abdominal pain and dark stools w constipation back in May. He presents to [facility] ER today weakness and fatigue, moderate and + Black stools for three days, on coumadin secondary to warfarin use in the setting of an LVAD. Patient has required transfusions for GIB in the past. Mostly recently back in 2021 pt had anemia with dark stools. No interventions was done at that time. However Last Endoscopy was done in 2020 (negative). Today labs show patient is anemic with H/H of 4.5/16.3,. INR is 8.84 MAP in the 90s, Temp 35.1. He denies any chest pain, shortness of breath, dizziness, abd pain, nausea or vomiting.       (2021 16:57)      Surgery/Hospital Course:   admit for melena w/ anemia, INR 8.84   6/15 Capsul study (+) for small bowel bleed, balloon endoscopy (old blood in prox ileum); post EGD - septic w/ L opacity, re-intubated for concern for aspiration, TTE (Mod MR, decrease biV w/ interventricular septum boweing towards R)   bronch    +C Diff    TC    CT C/A/P: Fluid filled colon which may be 2/2 rapid transit. Small bilateral pleffs with associates. Compressive atelectasis New ISABELLE & LLL  parenchymal opacities, suspicious for pneumonia. 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    #8 Shiley trach at bedside    CPAP trials    LVAD speed increased to 9200   Bronch; Central line dc'ed   TC since , continue as tolerated. Patient transferred to SDU.    Cont PT, no trach downsize today(#8cuffed)/ Anticoagulation/ Continue TF, speech f/u/ Vanco taper    oob to chair  INR  2.47  5 mg coumadin     increased lop to 25 q8  per HF   INR today 2.64.  H&H 7.3 this AM.  Will repeat CBC at noon, and will send stool guaiac Patient with persistent abdominal tenderness, rate of tube feeds decreased.  No nausea/vomiting.     INR today 2.4H&H 9.1.6 low flow overnight /N&V  NPO resolved for capsule study  Coumadin on hold refusing Tube feeds on D5 1/2 normal  @50 cc/hr   INR 2.69  H&H 7..1 refusing Tube feeds on D5 1/2 normal  @50 cc/hr. This am + BM Anusha Oneill HF  aware- PRBC x1  GI team consulted -  NPO  plan on study in am-  D/w Dr Cadet Patient  to return to CTU for further management; 1PRBCS    Post op INR 2.2 today.  No bleeding. BC + for SM.  Pt is hypotensive requiring pressor and inotropic support.  ID follow up today on Cefepime will follow.   R PTC for PTX    CT C/A/P: sub q emphysema in R chest wall, GGO RUL, small ascites CTH negative; Abd US: GB thickening, pericholecystic fluid     Perchole drain in place continues to drain total output overnight 133.  Fever today 38.8 given tylenol.  Will repeat BC now.     duplex LE negative    Patient with persistent abdominal pain, refusing tube feeds and medications, Psych consulted   CTA A/P ordered to r/o mesenteric ischemia 2/2 persistent anorexia, nausea, vomiting. Revealed:  Evaluation of the mesenteric vessels is limited by streak artifact from LVAD. There appears to be severe stenosis of the proximal SMA; abdominal mesenteric doppler is recommended for further evaluation. 2.  No small bowel findings to suggest acute mesenteric ischemia. 3.  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  8/15: Cultures resulted BC 1/2 +GPC in clusters, SC enterobacter; mesenteric duplex: borderline stenosis of proximal SMA     Today:  Continue TC as tolerated. Will consult ethics and palliative as patient is refusing feeds. Ambulate as tolerated.     REVIEW OF SYSTEMS:  Unable to obtain, pt is trached.     ICU Vital Signs Last 24 Hrs  T(C): 37.1 (18 Aug 2021 04:00), Max: 37.2 (17 Aug 2021 20:00)  T(F): 98.8 (18 Aug 2021 04:00), Max: 99 (17 Aug 2021 20:00)  HR: 88 (18 Aug 2021 07:00) (81 - 121)  BP: --  BP(mean): --  ABP: 87/70 (18 Aug 2021 07:00) (73/59 - 99/77)  ABP(mean): 77 (18 Aug 2021 07:00) (65 - 87)  RR: 23 (18 Aug 2021 07:00) (10 - 40)  SpO2: 100% (18 Aug 2021 07:00) (93% - 100%)      Physical Exam:  Gen:  awake and alert, NAD, Malnourished  CNS:  intact, nonfocal, responds to all commands  Neck: no JVD, +TC   RES : course breath sounds, no wheezing              CVS: +LVAD hum   Abd: Soft, minimally-moderately tender in RUQ by perc dawn site, NT everywhere else, positive BS throughout. Perc dawn drain site c/d/i draining bilious fluid.  Skin: No rash  Ext:  no edema  ============================I/O===========================   I&O's Detail    17 Aug 2021 07:01  -  18 Aug 2021 07:00  --------------------------------------------------------  IN:    dextrose 5% + sodium chloride 0.45%: 1200 mL    Enteral Tube Flush: 60 mL    IV PiggyBack: 500 mL    Miscellaneous Tube Feedin mL  Total IN: 1930 mL    OUT:    Drain (mL): 320 mL    NiCARdipine: 0 mL    Voided (mL): 1175 mL  Total OUT: 1495 mL    Total NET: 435 mL        ============================ LABS =========================                        7.8    12.13 )-----------( 194      ( 18 Aug 2021 00:31 )             25.3     08-18    132<L>  |  97  |  11  ----------------------------<  111<H>  3.7   |  25  |  0.52    Ca    9.4      18 Aug 2021 00:31  Phos  2.8       Mg     1.6         TPro  7.0  /  Alb  3.0<L>  /  TBili  0.5  /  DBili  x   /  AST  29  /  ALT  46<H>  /  AlkPhos  164<H>      LIVER FUNCTIONS - ( 18 Aug 2021 00:31 )  Alb: 3.0 g/dL / Pro: 7.0 g/dL / ALK PHOS: 164 U/L / ALT: 46 U/L / AST: 29 U/L / GGT: x             ABG - ( 18 Aug 2021 00:29 )  pH, Arterial: 7.39  pH, Blood: x     /  pCO2: 48    /  pO2: 175   / HCO3: 29    / Base Excess: 3.5   /  SaO2: 100.0               ======================Micro/Rad/Cardio=================  Culture: Reviewed   CXR: Reviewed  Echo:Reviewed  ======================================================  PAST MEDICAL & SURGICAL HISTORY:  CHF (congestive heart failure)    CAD (coronary artery disease)    Depression    Pleural effusion    History of 2019 novel coronavirus disease (COVID-19)  2020    Hemorrhoids    Bleeding hemorrhoids    Peripheral arterial disease    Claudication    BPH with urinary obstruction    ACC/AHA stage D systolic heart failure    Anticoagulation goal of INR 2.0 to 2.5    Falls    Clavicle fracture    CKD (chronic kidney disease), stage III    Iron deficiency anemia    H/O epistaxis    Vertigo    GI bleed    S/P TVR (tricuspid valve repair)    S/P ventricular assist device    S/P endoscopy      ====================ASSESSMENT ==============  Stage D Nonischemic Cardiomyopathy, Status Post HM2 on 2017    Cardiogenic shock  Hemodynamic instability   Acute hypoxemic respiratory failure  GI bleed , Status Post Enteroscopy   Anemia, in setting of melena   Chronic Kidney Disease  Stress hyperglycemia   C.diff positive on    Hypovolemic shock  Septic shock    Plan:  ====================== NEUROLOGY=====================  Nonfocal, continue to monitor neuro status   Tylenol PRN for analgesia   Clonazepam for anxiety/sleep regimen   C/w mirtazapine for depression  Ambulate as tolerated     acetaminophen    Suspension .. 650 milliGRAM(s) Oral every 6 hours PRN Mild Pain (1 - 3)  clonazePAM  Tablet 0.25 milliGRAM(s) Oral at bedtime  mirtazapine 7.5 milliGRAM(s) Oral daily    ==================== RESPIRATORY======================  Acute hypoxemic respiratory failure s/p #8 shiley trach on ; patient put back on assist control on  2/2 worsening of clinical condition, continue TC as tolerated   Requiring intermittent full vent support, continue close monitoring of respiratory rate and breathing pattern, following of ABG’s with A-line monitoring, continuous pulse oximetry monitoring.     Mechanical Ventilation:  Mode: off    ====================CARDIOVASCULAR==================  Stage D Nonischemic Cardiomyopathy, Status Post HM2 on 2017; LVAD settings 9200, flow 4.8   TTE : reveals at least moderate MR, mild AI, severe global LV syst dysfxn, dec RV fxn   Continue invasive hemodynamic monitoring   Propranolol for rate control of HR 2/2 Hyperthyroidism, titrate as tolerated    propranolol 20 milliGRAM(s) Oral every 8 hours    ===================HEMATOLOGIC/ONC ===================  Acute blood loss anemia, monitor H&H/Plts    Continue monitor LDH daily     VTE prophylaxis, heparin   Injectable 5000 Unit(s) SubCutaneous every 8 hours    ===================== RENAL =========================  Continue monitoring urine output, I&OS, BUN/Cr   Replete lytes PRN. Keep K> 4 and Mg >2     ==================== GASTROINTESTINAL===================  S/p cholecystostomy tube placed on : with IR with -60cc of black bile, will monitor site closely.   Recent emesis on  in setting of abdominal pain, as per GI likely component of illeus given critical illness   CTA A/P : Evaluation of the mesenteric vessels is limited by streak artifact from LVAD. There appears to be severe stenosis of the proximal SMA; abdominal mesenteric doppler is recommended for further evaluation. 2.  No small bowel findings to suggest acute mesenteric ischemia. 3.  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  Vascular surgery and IR consulted for potential SMA stent. Both teams state that SMA stent is not warranted as they don't believe SMA stenosis is as severe on CTA as read by radiology and do not believe that this stenosis is causing patient's intolerance to TFs  Mesenteric duplex on 8/15 borderline stenosis of proximal SMA   TwoCal HN feeds - patient refusing feeds, will consult palliative and ethics   Zofran PRN for nausea   Reglan for gut motility     dextrose 5% + sodium chloride 0.45%. 1000 milliLiter(s) (50 mL/Hr) IV Continuous <Continuous>  GI prophylaxis, pantoprazole  Injectable 40 milliGRAM(s) IV Push every 12 hours  ondansetron Injectable 4 milliGRAM(s) IV Push every 6 hours PRN Nausea and/or Vomiting  metoclopramide Injectable 10 milliGRAM(s) IV Push every 8 hours    =======================    ENDOCRINE  =====================  Stress hyperglycemia, continue glucose control with admelog sliding scale   Thyroid US to evaluate for thyroid nodule/goiter in setting of hyperthyroid  - contraindicated at this time 2/2 trach/will do when trach is out   C/w methimazole, restarted yesterday for low TSH    insulin lispro (ADMELOG) corrective regimen sliding scale   SubCutaneous every 6 hours  methimazole 10 milliGRAM(s) Oral every 8 hours    ========================INFECTIOUS DISEASE================  Afebrile, WBC rising 8.05->12.13   Continue trending WBC and monitoring fever curve   BC 1/2 +GPC in clusters, SC enterobacter(CRE). C/w cefepime and vancomycin     cefepime   IVPB 1000 milliGRAM(s) IV Intermittent every 8 hours  vancomycin    Solution 125 milliGRAM(s) Oral every 6 hours      Patient requires continuous monitoring with bedside rhythm monitoring, pulse ox monitoring, and intermittent blood gas analysis. Care plan discussed with ICU care team. Patient remained critical and at risk for life threatening decompensation.     By signing my name below, IAgnieszka, attest that this documentation has been prepared under the direction and in the presence of CLAUDIA Lee   Electronically signed: Romel Shaffer, 21 @ 08:18    I, Georgina Perez, personally performed the services described in this documentation. all medical record entries made by the romel were at my direction and in my presence. I have reviewed the chart and agree that the record reflects my personal performance and is accurate and complete  Electronically signed: CLAUDIA Lee        RICKY HAMPTON  MRN-[id number]  Patient is a 66y old  Male who presents with a chief complaint of Anemia, Supratherapeutic INR, Dark Stools (17 Aug 2021 17:40)    HPI:  65M PMH ACC/AHA stage D HF due to NICM HM2 LVAD , TV annuloplasty ring 17 as destination therapy due to severe peripheral artery disease with significant stenosis  SIADH, Depression, CKD-3 with hyperkalemia, past E. coli UTIs, driveline drainage (21) and COVID-19 (back in 2020)  He was recently seen in clinic where he complained of abdominal pain and dark stools w constipation back in May. He presents to [facility] ER today weakness and fatigue, moderate and + Black stools for three days, on coumadin secondary to warfarin use in the setting of an LVAD. Patient has required transfusions for GIB in the past. Mostly recently back in 2021 pt had anemia with dark stools. No interventions was done at that time. However Last Endoscopy was done in 2020 (negative). Today labs show patient is anemic with H/H of 4.5/16.3,. INR is 8.84 MAP in the 90s, Temp 35.1. He denies any chest pain, shortness of breath, dizziness, abd pain, nausea or vomiting.       (2021 16:57)      Surgery/Hospital Course:   admit for melena w/ anemia, INR 8.84   6/15 Capsul study (+) for small bowel bleed, balloon endoscopy (old blood in prox ileum); post EGD - septic w/ L opacity, re-intubated for concern for aspiration, TTE (Mod MR, decrease biV w/ interventricular septum boweing towards R)   bronch    +C Diff    TC    CT C/A/P: Fluid filled colon which may be 2/2 rapid transit. Small bilateral pleffs with associates. Compressive atelectasis New ISABELLE & LLL  parenchymal opacities, suspicious for pneumonia. 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    #8 Shiley trach at bedside    CPAP trials    LVAD speed increased to 9200   Bronch; Central line dc'ed   TC since , continue as tolerated. Patient transferred to SDU.    Cont PT, no trach downsize today(#8cuffed)/ Anticoagulation/ Continue TF, speech f/u/ Vanco taper    oob to chair  INR  2.47  5 mg coumadin     increased lop to 25 q8  per HF   INR today 2.64.  H&H 7.3 this AM.  Will repeat CBC at noon, and will send stool guaiac Patient with persistent abdominal tenderness, rate of tube feeds decreased.  No nausea/vomiting.     INR today 2.4H&H 9.1.6 low flow overnight /N&V  NPO resolved for capsule study  Coumadin on hold refusing Tube feeds on D5 1/2 normal  @50 cc/hr   INR 2.69  H&H 7..1 refusing Tube feeds on D5 1/2 normal  @50 cc/hr. This am + BM Anusha Oneill HF  aware- PRBC x1  GI team consulted -  NPO  plan on study in am-  D/w Dr Cadet Patient  to return to CTU for further management; 1PRBCS    Post op INR 2.2 today.  No bleeding. BC + for SM.  Pt is hypotensive requiring pressor and inotropic support.  ID follow up today on Cefepime will follow.   R PTC for PTX    CT C/A/P: sub q emphysema in R chest wall, GGO RUL, small ascites CTH negative; Abd US: GB thickening, pericholecystic fluid     Perchole drain in place continues to drain total output overnight 133.  Fever today 38.8 given tylenol.  Will repeat BC now.     duplex LE negative    Patient with persistent abdominal pain, refusing tube feeds and medications, Psych consulted   CTA A/P ordered to r/o mesenteric ischemia 2/2 persistent anorexia, nausea, vomiting. Revealed:  Evaluation of the mesenteric vessels is limited by streak artifact from LVAD. There appears to be severe stenosis of the proximal SMA; abdominal mesenteric doppler is recommended for further evaluation. 2.  No small bowel findings to suggest acute mesenteric ischemia. 3.  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  8/15: Cultures resulted BC 1/2 +GPC in clusters, SC enterobacter; mesenteric duplex: borderline stenosis of proximal SMA     Today:  Continue TC as tolerated. Will consult ethics and palliative as patient is refusing feeds. will also call psychiatry Ambulate as tolerated.     REVIEW OF SYSTEMS:  Unable to obtain, pt is trached.     ICU Vital Signs Last 24 Hrs  T(C): 37.1 (18 Aug 2021 04:00), Max: 37.2 (17 Aug 2021 20:00)  T(F): 98.8 (18 Aug 2021 04:00), Max: 99 (17 Aug 2021 20:00)  HR: 88 (18 Aug 2021 07:00) (81 - 121)  BP: --  BP(mean): --  ABP: 87/70 (18 Aug 2021 07:00) (73/59 - 99/77)  ABP(mean): 77 (18 Aug 2021 07:00) (65 - 87)  RR: 23 (18 Aug 2021 07:00) (10 - 40)  SpO2: 100% (18 Aug 2021 07:00) (93% - 100%)      Physical Exam:  Gen:  awake and alert, NAD, Malnourished  CNS:  intact, nonfocal, responds to all commands  Neck: no JVD, +TC   RES : course breath sounds, no wheezing              CVS: +LVAD hum   Abd: Soft, minimally-moderately tender in RUQ by perc dawn site, NT everywhere else, positive BS throughout. Perc dawn drain site c/d/i draining bilious fluid.  Skin: No rash  Ext:  no edema  ============================I/O===========================   I&O's Detail    17 Aug 2021 07:01  -  18 Aug 2021 07:00  --------------------------------------------------------  IN:    dextrose 5% + sodium chloride 0.45%: 1200 mL    Enteral Tube Flush: 60 mL    IV PiggyBack: 500 mL    Miscellaneous Tube Feedin mL  Total IN: 1930 mL    OUT:    Drain (mL): 320 mL    NiCARdipine: 0 mL    Voided (mL): 1175 mL  Total OUT: 1495 mL    Total NET: 435 mL        ============================ LABS =========================                        7.8    12.13 )-----------( 194      ( 18 Aug 2021 00:31 )             25.3     08-18    132<L>  |  97  |  11  ----------------------------<  111<H>  3.7   |  25  |  0.52    Ca    9.4      18 Aug 2021 00:31  Phos  2.8       Mg     1.6         TPro  7.0  /  Alb  3.0<L>  /  TBili  0.5  /  DBili  x   /  AST  29  /  ALT  46<H>  /  AlkPhos  164<H>      LIVER FUNCTIONS - ( 18 Aug 2021 00:31 )  Alb: 3.0 g/dL / Pro: 7.0 g/dL / ALK PHOS: 164 U/L / ALT: 46 U/L / AST: 29 U/L / GGT: x             ABG - ( 18 Aug 2021 00:29 )  pH, Arterial: 7.39  pH, Blood: x     /  pCO2: 48    /  pO2: 175   / HCO3: 29    / Base Excess: 3.5   /  SaO2: 100.0               ======================Micro/Rad/Cardio=================  Culture: Reviewed   CXR: Reviewed  Echo:Reviewed  ======================================================  PAST MEDICAL & SURGICAL HISTORY:  CHF (congestive heart failure)    CAD (coronary artery disease)    Depression    Pleural effusion    History of 2019 novel coronavirus disease (COVID-19)  2020    Hemorrhoids    Bleeding hemorrhoids    Peripheral arterial disease    Claudication    BPH with urinary obstruction    ACC/AHA stage D systolic heart failure    Anticoagulation goal of INR 2.0 to 2.5    Falls    Clavicle fracture    CKD (chronic kidney disease), stage III    Iron deficiency anemia    H/O epistaxis    Vertigo    GI bleed    S/P TVR (tricuspid valve repair)    S/P ventricular assist device    S/P endoscopy      ====================ASSESSMENT ==============  Stage D Nonischemic Cardiomyopathy, Status Post HM2 on 2017    Cardiogenic shock  Hemodynamic instability   Acute hypoxemic respiratory failure  GI bleed , Status Post Enteroscopy   Anemia, in setting of melena   Chronic Kidney Disease  Stress hyperglycemia   C.diff positive on    Hypovolemic shock  Septic shock    Plan:  ====================== NEUROLOGY=====================  Nonfocal, continue to monitor neuro status   Tylenol PRN for analgesia   Clonazepam for anxiety/sleep regimen   C/w mirtazapine for depression  Ambulate as tolerated   Will reconsult psychiatry to eval capacity for descion making      acetaminophen    Suspension .. 650 milliGRAM(s) Oral every 6 hours PRN Mild Pain (1 - 3)  clonazePAM  Tablet 0.25 milliGRAM(s) Oral at bedtime  mirtazapine 7.5 milliGRAM(s) Oral daily    ==================== RESPIRATORY======================  Acute hypoxemic respiratory failure s/p #8 shiley trach on ; patient put back on assist control on  2/2 worsening of clinical condition, continue TC as tolerated (TC since )    Requiring intermittent full vent support, continue close monitoring of respiratory rate and breathing pattern, following of ABG’s with A-line monitoring, continuous pulse oximetry monitoring.     Mechanical Ventilation:  Mode: off    ====================CARDIOVASCULAR==================  Stage D Nonischemic Cardiomyopathy, Status Post HM2 on 2017; LVAD settings 9200, flow 4.8   TTE : reveals at least moderate MR, mild AI, severe global LV syst dysfxn, dec RV fxn   Continue invasive hemodynamic monitoring   Propranolol for rate control of HR 2/2 Hyperthyroidism, titrate as tolerated    propranolol 20 milliGRAM(s) Oral every 8 hours    ===================HEMATOLOGIC/ONC ===================  Acute blood loss anemia, monitor H&H/Plts    Continue monitor LDH daily     VTE prophylaxis, heparin   Injectable 5000 Unit(s) SubCutaneous every 8 hours    ===================== RENAL =========================  Continue monitoring urine output, I&OS, BUN/Cr   Replete lytes PRN. Keep K> 4 and Mg >2     ==================== GASTROINTESTINAL===================  S/p cholecystostomy tube placed on : with IR with -60cc of black bile, will monitor site closely.   Recent emesis on  in setting of abdominal pain, as per GI likely component of illeus given critical illness   CTA A/P : Evaluation of the mesenteric vessels is limited by streak artifact from LVAD. There appears to be severe stenosis of the proximal SMA; abdominal mesenteric doppler is recommended for further evaluation. 2.  No small bowel findings to suggest acute mesenteric ischemia. 3.  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  Vascular surgery and IR consulted for potential SMA stent. Both teams state that SMA stent is not warranted as they don't believe SMA stenosis is as severe on CTA as read by radiology and do not believe that this stenosis is causing patient's intolerance to TFs  Mesenteric duplex on 8/15 borderline stenosis of proximal SMA   TwoCal HN feeds - patient refusing feeds, will consult palliative and ethics   Zofran PRN for nausea   Reglan for gut motility     dextrose 5% + sodium chloride 0.45%. 1000 milliLiter(s) (50 mL/Hr) IV Continuous <Continuous>  GI prophylaxis, pantoprazole  Injectable 40 milliGRAM(s) IV Push every 12 hours  ondansetron Injectable 4 milliGRAM(s) IV Push every 6 hours PRN Nausea and/or Vomiting  metoclopramide Injectable 10 milliGRAM(s) IV Push every 8 hours    =======================    ENDOCRINE  =====================  Stress hyperglycemia, continue glucose control with admelog sliding scale   Thyroid US to evaluate for thyroid nodule/goiter in setting of hyperthyroid  - contraindicated at this time 2/2 trach/will do when trach is out   C/w methimazole, restarted yesterday for low TSH    insulin lispro (ADMELOG) corrective regimen sliding scale   SubCutaneous every 6 hours  methimazole 10 milliGRAM(s) Oral every 8 hours    ========================INFECTIOUS DISEASE================  Afebrile, WBC rising 8.05->12.13   Continue trending WBC and monitoring fever curve   BC 1/2 +GPC in clusters, SC enterobacter(CRE). C/w cefepime and vancomycin     cefepime   IVPB 1000 milliGRAM(s) IV Intermittent every 8 hours  vancomycin    Solution 125 milliGRAM(s) Oral every 6 hours      Patient requires continuous monitoring with bedside rhythm monitoring, pulse ox monitoring, and intermittent blood gas analysis. Care plan discussed with ICU care team. Patient remained critical and at risk for life threatening decompensation.     By signing my name below, I, Agnieszka Cherry, attest that this documentation has been prepared under the direction and in the presence of CLAUDIA Lee   Electronically signed: Cesia Shaffer, 21 @ 08:18    I, Georgina Perez, personally performed the services described in this documentation. all medical record entries made by the luisibmel were at my direction and in my presence. I have reviewed the chart and agree that the record reflects my personal performance and is accurate and complete  Electronically signed: CLAUDIA Lee

## 2021-08-18 NOTE — PROGRESS NOTE ADULT - ASSESSMENT
Assessment and Recommendation:   · Assessment	  Assessment and recommendation :  Recurrent Acute hypoxic respiratory Failure S/P tracheostomy on trach. collar  50% FI02,   Acute blood loss anemia S/P multiple  blood transfusion   S/P septic shock off vasopressin , levophed and off  Dobutamine   S/P sepsis with serratia marcescens in the blood S/P  cefepime  S/P cholecystostomy tube placement by IR    AF RVR back to regular sinus Rhythm   Non ischemic cardiomyopathy continue ACE inhibitor and B-Blockers   S/P Septic shock and cardiogenic shock   Stage D systolic heart failure S/P LVAD HM2   MH2 LVAD  with  TV Annuloplasty  Severe peripheral vascular disease   no evidence of significant mesenteric thrombosis   severe hyperglycemia on insulin coverage    critical care polyneuropathy   Anemia of Acute blood Loss   severe protein caloric malnutrition   S/P Small bowel bleeding   S/P blood and FFP transfusion   Chronic kidney disease stage III  refuse NGT feeding.  psychiatric evaluation   GI prophylaxis with PPI

## 2021-08-19 NOTE — CHART NOTE - NSCHARTNOTEFT_GEN_A_CORE
Ethics consult initiated.     Myself and Ethicist Matt Duffy met with patient at bedside. His mood was sad and down and he was not so willing to fully participate in the conversation. He did say his stomach hurt and that was part of his decision to not want tube feeds, he did say he would consider restarting them in the near future, and that he wanted to get out of the hospital. He was eager to get up and do PT and was not interested in pursuing further conversation or clarification. I returned after PT when patient was sitting in his chair to continue the conversation however, he was not in the mood of talking further at that time either.     Mr. Quispe’s sad mood, in addition to the abdominal pain he experienced, is contributing to his decision to not want tube feeds at this time. I agree with a psychiatric evaluation and optimization of his mood so that he can make clear decisions. Additionally, his abdominal pain should be ameliorated to the extent possible in order to meet his needs.     Full consult note to follow.  Discussed with Jaclyn Green, Ethicist, and Matt Duffy, Ethicist.

## 2021-08-19 NOTE — PROGRESS NOTE ADULT - ASSESSMENT
Seen sitting in chair on CTU with Trach collar in place. Speaking limited due to trach but able to communicate sufficiently by speaking over trach and mouthing words, using hand gestures, and nodding yes/no. Noted to have copious secretions that he was able to cough up adequately. Appears tired, reports he walked all the way down unit hallway earlier and it tired him out. Still open to going outside again today via w/ch with staff. Poor sleep last night. Takes Klonapin at bedtime. Psych to see patient for med management. Mood "so-so" as indicated by hand gesture. Denies anhedonia. Denies SI/HI. Open to going outside later this morning when staff available. Receptive to support, validation. Returned later in morning but he changed his mind about going outside stating he was "too tired" and not feeling up to going.     Dx: Adjustment disorder with anxiety and depressed mood. F43.20 Systolic heart failure I50.2  LVAD Z95.811    Recommendations:   To promote sleep, keep lights and noise level to minimum during night  Engage with patient as often as possible throughout day to encourage communication  Behavioral Cardiology will follow      16 minutes spent on total patient encounter    Jessica Hennessy, PhD  [phone]

## 2021-08-19 NOTE — PROGRESS NOTE ADULT - SUBJECTIVE AND OBJECTIVE BOX
Behavioral Cardiology Progress Note     History of present illness: Mr. Quispe is a 66 year-old man with history of NICM s/p HM2 on 9/2017 as DT (due to severe PAD) with TV ring, prior COVID-19 infection 4/2020, recurrent syncope post LVAD s/p ILR, and chronic abdominal pain with prior negative workup who was admitted with symptomatic anemia with Hgb 4.5 in setting of INR 8.8 without hemodynamic instability. Testing showed active bleeding in the small bowel but subsequent enteroscopy 6/15 did not reveal any active bleeding. He acutely decompensated after procedure with fever/hypertension, low flow alarms, and pulmonary infiltrate with hypoxia requiring intubation from probable aspiration PNA.  Course notable for inability to wean ventilator with persistent secretions for which he underwent tracheostomy as well as acute c diff colitis.     Social history: , lives in his sister’s house in [place]. Has 3 sons (2 live in , 1 in [facility]). One of 3 siblings. Lives in his sister’s house in [place] (Cristina Rudolph [phone]), also in the home are niece ([phone]) and nephew (Miller Rudolph).  Another sister lives close by in [place] and all other siblings live in [facility] which the family visit often.  Worked full time at [employer] in [place], stopped working due to heart disease. Education: high school graduate.     Substance use:   Tobacco: Former smoker, 8-10 cigarettes/day for 30 years.   Alcohol: Past use of 3-4 drinks of Henessey 2x/week. None for past 4 years.   Drug: Denies any drug use.     Past psychiatric history: Denies     Mental status exam: Seen sitting in chair with Trach collar. Awake but tired, oriented x2-3 (did not identify date). Thought process goal oriented. No evidence of any psychosis, kenan, delusions. No SI/HI. Mood "tired." Affect constricted. Denies SI/HI. Insight and judgment adequate.

## 2021-08-19 NOTE — PROGRESS NOTE ADULT - SUBJECTIVE AND OBJECTIVE BOX
RICKY JOINT  MRN#: [id number]  Subjective:  Pulmonary progress  : recurrent Acute hypoxic respiratory Failure ,aspiration pneumonia, NICM  , chart reviewed and H/O obtained radiological and Laboratory study reviewed patient Examined     65M PMH ACC/AHA stage D HF due to NICM HM2 LVAD , TV annuloplasty ring 17 as destination therapy due to severe peripheral artery disease with significant stenosis  SIADH, Depression, CKD-3 with hyperkalemia, past E. coli UTIs, driveline drainage (21) and COVID-19 (back in 2020)  He was recently seen in clinic where he complained of abdominal pain and dark stools w constipation back in May. He presents to [facility] ER today weakness and fatigue, moderate and + Black stools for three days, on coumadin secondary to warfarin use in the setting of an LVAD. Patient has required transfusions for GIB in the past. Mostly recently back in 2021 pt had anemia with dark stools. No interventions was done at that time. However Last Endoscopy was done in 2020 (negative). Today labs show patient is anemic with H/H of 4.5/16.3,. INR is 8.84 MAP in the 90s, Temp 35.1. He denies any chest pain, shortness of breath, dizziness, abd pain, nausea or vomiting. found to have  rectal bleeding underwent endoscopy ,old blood in the proximal ileum ,  develop sepsis with LL opacity given Antibiotics , Extubated , reintubated , Bronchoscopy on Zosyn for LL pneumonia  and Amiodarone S/P TV Annuloplasty , patient remain intubated on full ventilatory support .S/P multiple units of blood transfusion , remain on full ventilatory support on Precedex and propofol , new central IJ line , diarrhea C diff. +ve on po vancomycin and IV Flagyl,  mildly distended belly , fever start on cefepime 2gm q 8 hrs S/P tracheostomy .  new RT Subclavian central line continue on contact  isolation ,still diarrhea on C-diff antibiotics ENT follow up appreciated , trial of C-PAP as tolerated , , copious secretion from trach. site chest x ray left lower lobe pneumonia , tolerating trch. collar 50% FI02 still excessive secretion need pulmonary toilet , off Ancef antibiotic , no more diarrhea back on full support mechanical ventilator , chest x ray show improvement in LLL air space disease, more awake and responsive on tube feeding no more diarrhea , S/P  Ancef for bacteremia no fever ,ID follow up noted ,  no nausea or vomiting or diarrhea still very weak and tired , event note pulled NG tube now replaced , back on tube feeding ,still on po vancomycin , getting PT and OT at bed side , no plan for decannulation for now. , no more diarrhea receiving PT and OPT at bed side , minimal secretion from tracheostomy site , no SOB getting stronger , improve muscle tone patient transfer to monitor bed still on contact isolation for C-Difficel colitis on 50% FI02, NG tube clogged , and change to resume tube feeding still loose stool . H/H drop significantly require blood transfusion , most likely GI bleeding , IV heparin D/C , vomiting 200 cc of creamy color tube feeding on hold no sob, still melena monitor in the CTU possible capsule endoscopy , H/H is stable ., patient develop TR sided  pneumothorax require chest tube placement , RT IJ central line  placed , develop fever shaking chills , blood culture positive for serratia marcescens , start on cefepime .the patient  become hypoxic and hypotensive placed on full ventilatory support and Vasopressin , levophed and Dobutamine ,S/P blood transfusion on meropenem and vancomycin , IR note appreciated   , on and  off pressors , occasional agitation on Precedex .S/P IR cholecystostomy tube drainage placement in the RT upper Quadrant , resume anticoagulation chest x ray noted C-PAP trail lasted only for 2 hrs , new RT SC line and D/C RT IJ line , RT pig tail cathter has been removed , tolerating C-PAP trial placed on trach. collar 50% FI02 GI consultant noted on NG tube feeding as tolerated , develop AF RVR S/P  bolus Amiodarone  back to regular sinus Rhythm , Flat Affect depressed , back on tube feeding Vital AF at 60 cc/ hr .still intermittent abdominal pain , no fever saturation is accepted  back on full ventilatory support , tired and sleepy on round   Dark stool evaluated by Vascular service recommend mesenteric Duplex. new Rt IJ line , C-PAP trial to wean off ventilator to wean to trah.collar 50% FI02, again refuse NG tube feeding , no plan for TPN , psychiatric evaluation for competence , palliative care note appreciated . vascular mesenteric Dopplex noted no evidence of severe  mesenteric arteries thrombosis .still refuse tube feeding seen by psychiatry        (2021 16:57)    PAST MEDICAL & SURGICAL HISTORY:  CHF (congestive heart failure)    CAD (coronary artery disease)  Depression    Pleural effusion    History of 2019 novel coronavirus disease (COVID-19)  2020    Hemorrhoids    Bleeding hemorrhoids    Peripheral arterial disease    Claudication    BPH with urinary obstruction    ACC/AHA stage D systolic heart failure  Anticoagulation goal of INR 2.0 to 2.5    Falls    Clavicle fracture    CKD (chronic kidney disease), stage III    Iron deficiency anemia    H/O epistaxis    Vertigo    GI bleed    S/P TVR (tricuspid valve repair)    S/P ventricular assist device    S/P endoscopy    OBJECTIVE:  ICU Vital Signs Last 24 Hrs  T(C): 38.2 (2021 10:00), Max: 38.5 (2021 12:00)  T(F): 100.8 (2021 10:00), Max: 101.3 (2021 12:00)  HR: 65 (2021 10:00) (61 - 69)  BP: --  BP(mean): --  ABP: 105/67 (2021 10:00) (90/54 - 113/64)  ABP(mean): 77 (2021 10:00) (63 - 77)  RR: 20 (2021 10:00) (19 - 35)  SpO2: 99% (2021 10:00) (96% - 100%)       @ 07: @ 07:00  --------------------------------------------------------  IN: 2693.9 mL / OUT: 1415 mL / NET: 1278.9 mL     @ 07: @ 10:49  --------------------------------------------------------  IN: 420.8 mL / OUT: 115 mL / NET: 305.8 mL  PHYSICAL EXAM:Daily   Elderly male S/P tracheostomy on trach. collar 50% FI02 ,  Daily Weight in k.4 (2021 00:00)  HEENT:     + NCAT  + EOMI  - Conjuctival edema   - Icterus   - Thrush   - ETT  + NGT/OGT  Neck:         + FROM  RT IJ  line JVD  - Nodes - Masses + Mid-line trachea + Tracheostomy  Chest:            normal A-P diameter    Lungs:          + CTA   + Rhonchi    - Rales    - Wheezing + Decreased  LT BS   - Dullness R L  Cardiac:       + S1 + S2    + RRR   - Irregular   - S3  - S4    - Murmurs   - Rub   - Hamman’s sign   Abdomen:    + BS  + Soft + Non-tender - Distended - Organomegaly - PEG .cholecystostomy tube in place  Extremities:   - Cyanosis U/L   - Clubbing  U/L  + LE/UE Edema   + Capillary refill    + Pulses   Neuro:        - Awake   -  Alert   - Confused   - Lethargic   + Sedated  + Generalized Weakness  Skin:        - Rashes    - Erythema   + Normal incisions   + IV sites intact          HOSPITAL MEDICATIONS: All medications reviewed and analyzed  MEDICATIONS  (STANDING):  amiodarone    Tablet 200 milliGRAM(s) Oral daily  chlorhexidine 0.12% Liquid 15 milliLiter(s) Oral Mucosa every 12 hours  chlorhexidine 2% Cloths 1 Application(s) Topical <User Schedule>  dexmedetomidine Infusion 0.5 MICROgram(s)/kG/Hr (9.81 mL/Hr) IV Continuous <Continuous>  dextrose 50% Injectable 50 milliLiter(s) IV Push every 15 minutes  heparin  Infusion 400 Unit(s)/Hr (12.5 mL/Hr) IV Continuous <Continuous>  Hydromorphone  Injectable 0.5 milliGRAM(s) IV Push once  insulin lispro (ADMELOG) corrective regimen sliding scale   SubCutaneous every 6 hours  pantoprazole  Injectable 40 milliGRAM(s) IV Push every 12 hours  piperacillin/tazobactam IVPB.. 3.375 Gram(s) IV Intermittent every 8 hours  propofol Infusion 20 MICROgram(s)/kG/Min (9.42 mL/Hr) IV Continuous <Continuous>  sodium chloride 0.9% lock flush 3 milliLiter(s) IV Push every 8 hours  sodium chloride 0.9%. 1000 milliLiter(s) (10 mL/Hr) IV Continuous <Continuous>    MEDICATIONS  (PRN):  acetaminophen    Suspension .. 650 milliGRAM(s) Oral every 6 hours PRN Temp greater or equal to 38C (100.4F)    LABS: All Lab data reviewed and analyzed                        8.6    15.41 )-----------( 220      ( 19 Aug 2021 01:02 )             27.6     08-19    130<L>  |  94<L>  |  8   ----------------------------<  178<H>  3.8   |  26  |  0.50    Ca    9.6      19 Aug 2021 01:02  Phos  2.6     08-19  Mg     1.7     08-    TPro  7.5  /  Alb  3.1<L>  /  TBili  0.5  /  DBili  x   /  AST  20  /  ALT  39  /  AlkPhos  197<H>  08-    Ca    9.4      18 Aug 2021 00:31  Phos  2.8     08-18  Mg     1.6     08-18    TPro  7.0  /  Alb  3.0<L>  /  TBili  0.5  /  DBili  x   /  AST  29  /  ALT  46<H>  /  AlkPhos  164<H>      Ca    10.1      17 Aug 2021 00:29  Phos  2.3     08-17  Mg     1.8     08-17    TPro  7.8  /  Alb  3.2<L>  /  TBili  0.6  /  DBili  x   /  AST  23  /  ALT  45  /  AlkPhos  140<H>  -    Ca    9.7      16 Aug 2021 01:09  Phos  1.7     08-16  Mg     1.7     08-16    TPro  8.3  /  Alb  3.4  /  TBili  0.7  /  DBili  x   /  AST  19  /  ALT  55<H>  /  AlkPhos  167<H>                                                                                         PTT - ( 2021 04:52 )  PTT:45.2 sec LIVER FUNCTIONS - ( 2021 00:42 )  Alb: 3.4 g/dL / Pro: 6.7 g/dL / ALK PHOS: 213 U/L / ALT: 15 U/L / AST: 24 U/L / GGT: x           RADIOLOGY: - Reviewed and analyzed RT Pig tail cathter  , LVAD HM2, CT scan of abdomen reviewed result noted

## 2021-08-19 NOTE — PROGRESS NOTE ADULT - SUBJECTIVE AND OBJECTIVE BOX
Chief complaint  Patient is a 66y old  Male who presents with a chief complaint of Anemia, Supratherapeutic INR, Dark Stools (19 Aug 2021 09:01)   Review of systems  Patient in bed, looks comfortable, no hypoglycemic episodes.    Labs and Fingersticks  CAPILLARY BLOOD GLUCOSE      POCT Blood Glucose.: 127 mg/dL (19 Aug 2021 12:40)  POCT Blood Glucose.: 155 mg/dL (19 Aug 2021 05:21)  POCT Blood Glucose.: 163 mg/dL (19 Aug 2021 00:46)  POCT Blood Glucose.: 114 mg/dL (18 Aug 2021 17:57)      Anion Gap, Serum: 10 (08-19 @ 01:02)  Anion Gap, Serum: 10 (08-18 @ 00:31)      Calcium, Total Serum: 9.6 (08-19 @ 01:02)  Calcium, Total Serum: 9.4 (08-18 @ 00:31)  Albumin, Serum: 3.1 *L* (08-19 @ 01:02)  Albumin, Serum: 3.0 *L* (08-18 @ 00:31)    Alanine Aminotransferase (ALT/SGPT): 39 (08-19 @ 01:02)  Alanine Aminotransferase (ALT/SGPT): 46 *H* (08-18 @ 00:31)  Alkaline Phosphatase, Serum: 197 *H* (08-19 @ 01:02)  Alkaline Phosphatase, Serum: 164 *H* (08-18 @ 00:31)  Aspartate Aminotransferase (AST/SGOT): 20 (08-19 @ 01:02)  Aspartate Aminotransferase (AST/SGOT): 29 (08-18 @ 00:31)        08-19    130<L>  |  94<L>  |  8   ----------------------------<  178<H>  3.8   |  26  |  0.50    Ca    9.6      19 Aug 2021 01:02  Phos  2.6     08-19  Mg     1.7     08-19    TPro  7.5  /  Alb  3.1<L>  /  TBili  0.5  /  DBili  x   /  AST  20  /  ALT  39  /  AlkPhos  197<H>  08-19                        8.6    15.41 )-----------( 220      ( 19 Aug 2021 01:02 )             27.6     Medications  MEDICATIONS  (STANDING):  cefepime   IVPB      cefepime   IVPB 1000 milliGRAM(s) IV Intermittent every 8 hours  chlorhexidine 0.12% Liquid 15 milliLiter(s) Oral Mucosa every 12 hours  chlorhexidine 2% Cloths 1 Application(s) Topical <User Schedule>  clonazePAM  Tablet 0.25 milliGRAM(s) Oral at bedtime  dextrose 5% + sodium chloride 0.45%. 1000 milliLiter(s) (50 mL/Hr) IV Continuous <Continuous>  heparin   Injectable 5000 Unit(s) SubCutaneous every 8 hours  insulin lispro (ADMELOG) corrective regimen sliding scale   SubCutaneous every 6 hours  methimazole 10 milliGRAM(s) Oral every 8 hours  metoclopramide Injectable 10 milliGRAM(s) IV Push every 8 hours  mirtazapine 7.5 milliGRAM(s) Oral daily  multivitamin 1 Tablet(s) Oral daily  pantoprazole  Injectable 40 milliGRAM(s) IV Push every 12 hours  propranolol 20 milliGRAM(s) Oral every 8 hours  thiamine 100 milliGRAM(s) Oral daily  vancomycin    Solution 125 milliGRAM(s) Oral every 6 hours      Physical Exam  General: Patient comfortable in bed  Vital Signs Last 12 Hrs  T(F): 98.9 (08-19-21 @ 12:00), Max: 98.9 (08-19-21 @ 12:00)  HR: 114 (08-19-21 @ 13:00) (90 - 114)  BP: --  BP(mean): --  RR: 38 (08-19-21 @ 13:00) (12 - 38)  SpO2: 97% (08-19-21 @ 13:00) (95% - 100%)    Diagnostics    US Thyroid: Routine   Indication: hyperthyroidism  Transport: eyal Bautista/ Monitor  Provider's Contact #: [phone] (08-08 @ 16:13)  TSH Receptor Antibody: AM Sched. Collection: 09-Aug-2021 06:00 (08-08 @ 13:31)  Thyroperoxidase Antibody: AM Sched. Collection: 09-Aug-2021 06:00 (08-08 @ 13:31)           Chief complaint  Patient is a 66y old  Male who presents with a chief complaint of Anemia, Supratherapeutic INR, Dark Stools (19 Aug 2021 09:01)   Review of systems  Patient in bed, looks comfortable, no hypoglycemic episodes.    Labs and Fingersticks  CAPILLARY BLOOD GLUCOSE      POCT Blood Glucose.: 127 mg/dL (19 Aug 2021 12:40)  POCT Blood Glucose.: 155 mg/dL (19 Aug 2021 05:21)  POCT Blood Glucose.: 163 mg/dL (19 Aug 2021 00:46)  POCT Blood Glucose.: 114 mg/dL (18 Aug 2021 17:57)      Anion Gap, Serum: 10 (08-19 @ 01:02)  Anion Gap, Serum: 10 (08-18 @ 00:31)      Calcium, Total Serum: 9.6 (08-19 @ 01:02)  Calcium, Total Serum: 9.4 (08-18 @ 00:31)  Albumin, Serum: 3.1 *L* (08-19 @ 01:02)  Albumin, Serum: 3.0 *L* (08-18 @ 00:31)    Alanine Aminotransferase (ALT/SGPT): 39 (08-19 @ 01:02)  Alanine Aminotransferase (ALT/SGPT): 46 *H* (08-18 @ 00:31)  Alkaline Phosphatase, Serum: 197 *H* (08-19 @ 01:02)  Alkaline Phosphatase, Serum: 164 *H* (08-18 @ 00:31)  Aspartate Aminotransferase (AST/SGOT): 20 (08-19 @ 01:02)  Aspartate Aminotransferase (AST/SGOT): 29 (08-18 @ 00:31)        08-19    130<L>  |  94<L>  |  8   ----------------------------<  178<H>  3.8   |  26  |  0.50    Ca    9.6      19 Aug 2021 01:02  Phos  2.6     08-19  Mg     1.7     08-19    TPro  7.5  /  Alb  3.1<L>  /  TBili  0.5  /  DBili  x   /  AST  20  /  ALT  39  /  AlkPhos  197<H>  08-19                        8.6    15.41 )-----------( 220      ( 19 Aug 2021 01:02 )             27.6     Medications  MEDICATIONS  (STANDING):  cefepime   IVPB      cefepime   IVPB 1000 milliGRAM(s) IV Intermittent every 8 hours  chlorhexidine 0.12% Liquid 15 milliLiter(s) Oral Mucosa every 12 hours  chlorhexidine 2% Cloths 1 Application(s) Topical <User Schedule>  clonazePAM  Tablet 0.25 milliGRAM(s) Oral at bedtime  dextrose 5% + sodium chloride 0.45%. 1000 milliLiter(s) (50 mL/Hr) IV Continuous <Continuous>  heparin   Injectable 5000 Unit(s) SubCutaneous every 8 hours  insulin lispro (ADMELOG) corrective regimen sliding scale   SubCutaneous every 6 hours  methimazole 10 milliGRAM(s) Oral every 8 hours  metoclopramide Injectable 10 milliGRAM(s) IV Push every 8 hours  mirtazapine 7.5 milliGRAM(s) Oral daily  multivitamin 1 Tablet(s) Oral daily  pantoprazole  Injectable 40 milliGRAM(s) IV Push every 12 hours  propranolol 20 milliGRAM(s) Oral every 8 hours  thiamine 100 milliGRAM(s) Oral daily  vancomycin    Solution 125 milliGRAM(s) Oral every 6 hours      Physical Exam  General: Patient comfortable in bed  Vital Signs Last 12 Hrs  T(F): 98.9 (08-19-21 @ 12:00), Max: 98.9 (08-19-21 @ 12:00)  HR: 114 (08-19-21 @ 13:00) (90 - 114)  BP: --  BP(mean): --  RR: 38 (08-19-21 @ 13:00) (12 - 38)  SpO2: 97% (08-19-21 @ 13:00) (95% - 100%)    Diagnostics    US Thyroid: Routine   Indication: hyperthyroidism  Transport: eyal Bautista/ Monitor  Provider's Contact #: [phone] (08-08 @ 16:13)  TSH Receptor Antibody: AM Sched. Collection: 09-Aug-2021 06:00 (08-08 @ 13:31)  Thyroperoxidase Antibody: AM Sched. Collection: 09-Aug-2021 06:00 (08-08 @ 13:31)

## 2021-08-19 NOTE — PROGRESS NOTE ADULT - SUBJECTIVE AND OBJECTIVE BOX
RICKY HAMPTON  MRN-[id number]  Patient is a 66y old  Male who presents with a chief complaint of Anemia, Supratherapeutic INR, Dark Stools (18 Aug 2021 16:18)    HPI:  65M PMH ACC/AHA stage D HF due to NICM HM2 LVAD , TV annuloplasty ring 17 as destination therapy due to severe peripheral artery disease with significant stenosis  SIADH, Depression, CKD-3 with hyperkalemia, past E. coli UTIs, driveline drainage (21) and COVID-19 (back in 2020)  He was recently seen in clinic where he complained of abdominal pain and dark stools w constipation back in May. He presents to [facility] ER today weakness and fatigue, moderate and + Black stools for three days, on coumadin secondary to warfarin use in the setting of an LVAD. Patient has required transfusions for GIB in the past. Mostly recently back in 2021 pt had anemia with dark stools. No interventions was done at that time. However Last Endoscopy was done in 2020 (negative). Today labs show patient is anemic with H/H of 4.5/16.3,. INR is 8.84 MAP in the 90s, Temp 35.1. He denies any chest pain, shortness of breath, dizziness, abd pain, nausea or vomiting.       (2021 16:57)      Surgery/Hospital Course:   admit for melena w/ anemia, INR 8.84   6/15 Capsul study (+) for small bowel bleed, balloon endoscopy (old blood in prox ileum); post EGD - septic w/ L opacity, re-intubated for concern for aspiration, TTE (Mod MR, decrease biV w/ interventricular septum boweing towards R)   bronch    +C Diff    TC    CT C/A/P: Fluid filled colon which may be 2/2 rapid transit. Small bilateral pleffs with associates. Compressive atelectasis New ISABELLE & LLL  parenchymal opacities, suspicious for pneumonia. 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    #8 Shiley trach at bedside    CPAP trials    LVAD speed increased to 9200   Bronch; Central line dc'ed   TC since , continue as tolerated. Patient transferred to SDU.    Cont PT, no trach downsize today(#8cuffed)/ Anticoagulation/ Continue TF, speech f/u/ Vanco taper    oob to chair  INR  2.47  5 mg coumadin     increased lop to 25 q8  per HF   INR today 2.64.  H&H 7.3 this AM.  Will repeat CBC at noon, and will send stool guaiac Patient with persistent abdominal tenderness, rate of tube feeds decreased.  No nausea/vomiting.     INR today 2.4H&H 9.1.6 low flow overnight /N&V  NPO resolved for capsule study  Coumadin on hold refusing Tube feeds on D5 1/2 normal  @50 cc/hr   INR 2.69  H&H 7..1 refusing Tube feeds on D5 1/2 normal  @50 cc/hr. This am + BM Anusha Oneill HF  aware- PRBC x1  GI team consulted -  NPO  plan on study in am-  D/w Dr Cadet Patient  to return to CTU for further management; 1PRBCS    Post op INR 2.2 today.  No bleeding. BC + for SM.  Pt is hypotensive requiring pressor and inotropic support.  ID follow up today on Cefepime will follow.   R PTC for PTX    CT C/A/P: sub q emphysema in R chest wall, GGO RUL, small ascites CTH negative; Abd US: GB thickening, pericholecystic fluid     Perchole drain in place continues to drain total output overnight 133.  Fever today 38.8 given tylenol.  Will repeat BC now.     duplex LE negative    Patient with persistent abdominal pain, refusing tube feeds and medications, Psych consulted   CTA A/P ordered to r/o mesenteric ischemia 2/2 persistent anorexia, nausea, vomiting. Revealed:  Evaluation of the mesenteric vessels is limited by streak artifact from LVAD. There appears to be severe stenosis of the proximal SMA; abdominal mesenteric doppler is recommended for further evaluation. 2.  No small bowel findings to suggest acute mesenteric ischemia. 3.  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  8/15: Cultures resulted BC 1/2 +GPC in clusters, SC enterobacter; mesenteric duplex: borderline stenosis of proximal SMA     Today:    REVIEW OF SYSTEMS:  Unable to obtain, pt is trached    ICU Vital Signs Last 24 Hrs  T(C): 36.7 (19 Aug 2021 08:00), Max: 37.3 (18 Aug 2021 20:00)  T(F): 98 (19 Aug 2021 08:00), Max: 99.1 (18 Aug 2021 20:00)  HR: 90 (19 Aug 2021 08:00) (90 - 114)  BP: --  BP(mean): --  ABP: 80/65 (19 Aug 2021 08:00) (73/56 - 100/71)  ABP(mean): 72 (19 Aug 2021 08:00) (63 - 83)  RR: 20 (19 Aug 2021 08:00) (0 - 35)  SpO2: 99% (19 Aug 2021 08:00) (92% - 100%)      Physical Exam:  Gen:  awake and alert, NAD, Malnourished  CNS:  intact, nonfocal, responds to all commands  Neck: no JVD, +TC   RES : course breath sounds, no wheezing              CVS: +LVAD hum   Abd: Soft, minimally-moderately tender in RUQ by perc dawn site, NT everywhere else, positive BS throughout. Perc dawn drain site c/d/i draining bilious fluid.  Skin: No rash  Ext:  no edema    ============================I/O===========================   I&O's Detail    18 Aug 2021 07:01  -  19 Aug 2021 07:00  --------------------------------------------------------  IN:    dextrose 5% + sodium chloride 0.45%: 200 mL    Enteral Tube Flush: 60 mL    IV PiggyBack: 200 mL    Miscellaneous Tube Feedin mL    Sodium Chloride 0.9% Bolus: 250 mL  Total IN: 1460 mL    OUT:    Drain (mL): 190 mL    Voided (mL): 1500 mL  Total OUT: 1690 mL    Total NET: -230 mL      19 Aug 2021 07:01  -  19 Aug 2021 08:42  --------------------------------------------------------  IN:    Miscellaneous Tube Feedin mL  Total IN: 90 mL    OUT:    dextrose 5% + sodium chloride 0.45%: 0 mL  Total OUT: 0 mL    Total NET: 90 mL        ============================ LABS =========================                        8.6    15.41 )-----------( 220      ( 19 Aug 2021 01:02 )             27.6     08-19    130<L>  |  94<L>  |  8   ----------------------------<  178<H>  3.8   |  26  |  0.50    Ca    9.6      19 Aug 2021 01:02  Phos  2.6     08-  Mg     1.7     -    TPro  7.5  /  Alb  3.1<L>  /  TBili  0.5  /  DBili  x   /  AST  20  /  ALT  39  /  AlkPhos  197<H>  08-    LIVER FUNCTIONS - ( 19 Aug 2021 01:02 )  Alb: 3.1 g/dL / Pro: 7.5 g/dL / ALK PHOS: 197 U/L / ALT: 39 U/L / AST: 20 U/L / GGT: x             ABG - ( 19 Aug 2021 00:53 )  pH, Arterial: 7.43  pH, Blood: x     /  pCO2: 46    /  pO2: 162   / HCO3: 30    / Base Excess: 5.5   /  SaO2: 99.9                ======================Micro/Rad/Cardio=================  Culture: Reviewed   CXR: Reviewed  Echo:Reviewed  ======================================================  PAST MEDICAL & SURGICAL HISTORY:  CHF (congestive heart failure)    CAD (coronary artery disease)    Depression    Pleural effusion    History of 2019 novel coronavirus disease (COVID-19)  2020    Hemorrhoids    Bleeding hemorrhoids    Peripheral arterial disease    Claudication    BPH with urinary obstruction    ACC/AHA stage D systolic heart failure    Anticoagulation goal of INR 2.0 to 2.5    Falls    Clavicle fracture    CKD (chronic kidney disease), stage III    Iron deficiency anemia    H/O epistaxis    Vertigo    GI bleed    S/P TVR (tricuspid valve repair)    S/P ventricular assist device    S/P endoscopy      ====================ASSESSMENT ==============  Stage D Nonischemic Cardiomyopathy, Status Post HM2 on 2017    Cardiogenic shock  Hemodynamic instability   Acute hypoxemic respiratory failure  GI bleed , Status Post Enteroscopy   Anemia, in setting of melena   Chronic Kidney Disease  Stress hyperglycemia   C.diff positive on    Hypovolemic shock  Septic shock  Leukocytosis    Plan:  ====================== NEUROLOGY=====================  Nonfocal, continue to monitor neuro status   Tylenol PRN for analgesia   Clonazepam for anxiety/sleep regimen   C/w mirtazapine for depression  Ambulate as tolerated   Will reconsult psychiatry to eval capacity for decision making        acetaminophen    Suspension .. 650 milliGRAM(s) Oral every 6 hours PRN Mild Pain (1 - 3)  clonazePAM  Tablet 0.25 milliGRAM(s) Oral at bedtime  metoclopramide Injectable 10 milliGRAM(s) IV Push every 8 hours  mirtazapine 7.5 milliGRAM(s) Oral daily    ==================== RESPIRATORY======================  Acute hypoxemic respiratory failure s/p #8 shiley trach on ; patient put back on assist control on  2/2 worsening of clinical condition, continue TC as tolerated (TC since )    Requiring intermittent full vent support, continue close monitoring of respiratory rate and breathing pattern, following of ABG’s with A-line monitoring, continuous pulse oximetry monitoring.     ====================CARDIOVASCULAR==================  Stage D Nonischemic Cardiomyopathy, Status Post HM2 on 2017; LVAD settings 9200, flow 4.8   TTE : reveals at least moderate MR, mild AI, severe global LV syst dysfxn, dec RV fxn   Continue invasive hemodynamic monitoring   Propranolol for rate control of HR 2/2 Hyperthyroidism, titrate as tolerated    propranolol 20 milliGRAM(s) Oral every 8 hours  ===================HEMATOLOGIC/ONC ===================  Acute blood loss anemia, monitor H&H/Plts    Continue monitor LDH daily   VTE prophylaxis w/ heparin    heparin   Injectable 5000 Unit(s) SubCutaneous every 8 hours  ===================== RENAL =========================  Continue to monitor I/Os, BUN/Creatinine, and urine output.   Goal net negative fluid balance. Replete lytes PRN. Keep K> 4 and Mg >2.     ==================== GASTROINTESTINAL===================  S/p cholecystostomy tube placed on : with IR with -60cc of black bile, will monitor site closely.   Recent emesis on  in setting of abdominal pain, as per GI likely component of illeus given critical illness   CTA A/P : Evaluation of the mesenteric vessels is limited by streak artifact from LVAD. There appears to be severe stenosis of the proximal SMA; abdominal mesenteric doppler is recommended for further evaluation. 2.  No small bowel findings to suggest acute mesenteric ischemia. 3.  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  Vascular surgery and IR consulted for potential SMA stent. Both teams state that SMA stent is not warranted as they don't believe SMA stenosis is as severe on CTA as read by radiology and do not believe that this stenosis is causing patient's intolerance to TFs  Mesenteric duplex on 8/15 borderline stenosis of proximal SMA   TwoCal HN feeds - patient refusing feeds, will consult palliative and ethics   Zofran PRN for nausea   Reglan for gut motility   Continue Protonix for stress ulcer prophylaxis.     dextrose 5% + sodium chloride 0.45%. 1000 milliLiter(s) (50 mL/Hr) IV Continuous <Continuous>  multivitamin 1 Tablet(s) Oral daily  pantoprazole  Injectable 40 milliGRAM(s) IV Push every 12 hours  thiamine 100 milliGRAM(s) Oral daily  metoclopramide Injectable 10 milliGRAM(s) IV Push every 8 hours  ondansetron Injectable 4 milliGRAM(s) IV Push every 6 hours PRN Nausea and/or Vomiting  =======================    ENDOCRINE  =====================  Stress hyperglycemia, continue glucose control with admelog sliding scale   Thyroid US to evaluate for thyroid nodule/goiter in setting of hyperthyroid  - contraindicated at this time 2/2 trach/will do when trach is out   C/w methimazole in setting of low TSH    insulin lispro (ADMELOG) corrective regimen sliding scale   SubCutaneous every 6 hours  methimazole 10 milliGRAM(s) Oral every 8 hours  ========================INFECTIOUS DISEASE================  Afebrile, Leukocytosis w/ wbc rising from 12.13 --> 15.41  Monitor temperature and trend WBC.   BC 1/2 +GPC in clusters, SC enterobacter(CRE). C/w cefepime and vancomycin     cefepime   IVPB      cefepime   IVPB 1000 milliGRAM(s) IV Intermittent every 8 hours  vancomycin    Solution 125 milliGRAM(s) Oral every 6 hours      Patient requires continuous monitoring with bedside rhythm monitoring, pulse ox monitoring, and intermittent blood gas analysis. Care plan discussed with ICU care team. Patient remained critical and at risk for life threatening decompensation.     By signing my name below, Janina STANLEY Tiffany, attest that this documentation has been prepared under the direction and in the presence of CLAUDIA Lee   Electronically signed: Emily Roa Scribe, 21 @ 08:42    I, CLAUDIA Lee , personally performed the services described in this documentation. all medical record entries made by the scribe were at my direction and in my presence. I have reviewed the chart and agree that the record reflects my personal performance and is accurate and complete  Electronically signed: CLAUDIA Lee        RICKY HAMPTON  MRN-[id number]  Patient is a 66y old  Male who presents with a chief complaint of Anemia, Supratherapeutic INR, Dark Stools (18 Aug 2021 16:18)    HPI:  65M PMH ACC/AHA stage D HF due to NICM HM2 LVAD , TV annuloplasty ring 17 as destination therapy due to severe peripheral artery disease with significant stenosis  SIADH, Depression, CKD-3 with hyperkalemia, past E. coli UTIs, driveline drainage (21) and COVID-19 (back in 2020)  He was recently seen in clinic where he complained of abdominal pain and dark stools w constipation back in May. He presents to [facility] ER today weakness and fatigue, moderate and + Black stools for three days, on coumadin secondary to warfarin use in the setting of an LVAD. Patient has required transfusions for GIB in the past. Mostly recently back in 2021 pt had anemia with dark stools. No interventions was done at that time. However Last Endoscopy was done in 2020 (negative). Today labs show patient is anemic with H/H of 4.5/16.3,. INR is 8.84 MAP in the 90s, Temp 35.1. He denies any chest pain, shortness of breath, dizziness, abd pain, nausea or vomiting.       (2021 16:57)      Surgery/Hospital Course:   admit for melena w/ anemia, INR 8.84   6/15 Capsul study (+) for small bowel bleed, balloon endoscopy (old blood in prox ileum); post EGD - septic w/ L opacity, re-intubated for concern for aspiration, TTE (Mod MR, decrease biV w/ interventricular septum boweing towards R)   bronch    +C Diff    TC    CT C/A/P: Fluid filled colon which may be 2/2 rapid transit. Small bilateral pleffs with associates. Compressive atelectasis New ISABELLE & LLL  parenchymal opacities, suspicious for pneumonia. 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    #8 Shiley trach at bedside    CPAP trials    LVAD speed increased to 9200   Bronch; Central line dc'ed   TC since , continue as tolerated. Patient transferred to SDU.    Cont PT, no trach downsize today(#8cuffed)/ Anticoagulation/ Continue TF, speech f/u/ Vanco taper    oob to chair  INR  2.47  5 mg coumadin     increased lop to 25 q8  per HF   INR today 2.64.  H&H 7.3 this AM.  Will repeat CBC at noon, and will send stool guaiac Patient with persistent abdominal tenderness, rate of tube feeds decreased.  No nausea/vomiting.     INR today 2.4H&H 9.1.6 low flow overnight /N&V  NPO resolved for capsule study  Coumadin on hold refusing Tube feeds on D5 1/2 normal  @50 cc/hr   INR 2.69  H&H 7..1 refusing Tube feeds on D5 1/2 normal  @50 cc/hr. This am + BM Anusha Oneill HF  aware- PRBC x1  GI team consulted -  NPO  plan on study in am-  D/w Dr Cadet Patient  to return to CTU for further management; 1PRBCS    Post op INR 2.2 today.  No bleeding. BC + for SM.  Pt is hypotensive requiring pressor and inotropic support.  ID follow up today on Cefepime will follow.   R PTC for PTX    CT C/A/P: sub q emphysema in R chest wall, GGO RUL, small ascites CTH negative; Abd US: GB thickening, pericholecystic fluid     Perchole drain in place continues to drain total output overnight 133.  Fever today 38.8 given tylenol.  Will repeat BC now.     duplex LE negative    Patient with persistent abdominal pain, refusing tube feeds and medications, Psych consulted   CTA A/P ordered to r/o mesenteric ischemia 2/2 persistent anorexia, nausea, vomiting. Revealed:  Evaluation of the mesenteric vessels is limited by streak artifact from LVAD. There appears to be severe stenosis of the proximal SMA; abdominal mesenteric doppler is recommended for further evaluation. 2.  No small bowel findings to suggest acute mesenteric ischemia. 3.  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  8/15: Cultures resulted BC 1/2 +GPC in clusters, SC enterobacter; mesenteric duplex: borderline stenosis of proximal SMA     Today: OOB to chair, walked with PT. psychiatry called to access capacity. Tube feeds on and off as per pt refusing.     REVIEW OF SYSTEMS:  Negative. pt talks over trach     ICU Vital Signs Last 24 Hrs  T(C): 36.7 (19 Aug 2021 08:00), Max: 37.3 (18 Aug 2021 20:00)  T(F): 98 (19 Aug 2021 08:00), Max: 99.1 (18 Aug 2021 20:00)  HR: 90 (19 Aug 2021 08:00) (90 - 114)  BP: --  BP(mean): --  ABP: 80/65 (19 Aug 2021 08:00) (73/56 - 100/71)  ABP(mean): 72 (19 Aug 2021 08:00) (63 - 83)  RR: 20 (19 Aug 2021 08:00) (0 - 35)  SpO2: 99% (19 Aug 2021 08:00) (92% - 100%)      Physical Exam:  Gen:  awake and alert, NAD, Malnourished  CNS:  intact, nonfocal, responds to all commands  Neck: no JVD, +TC   RES : course breath sounds, no wheezing              CVS: +LVAD hum   Abd: Soft, minimally-moderately tender in RUQ by perc dawn site, NT everywhere else, positive BS throughout. Perc danw drain site c/d/i draining bilious fluid.  Skin: No rash  Ext:  no edema    ============================I/O===========================   I&O's Detail    18 Aug 2021 07:01  -  19 Aug 2021 07:00  --------------------------------------------------------  IN:    dextrose 5% + sodium chloride 0.45%: 200 mL    Enteral Tube Flush: 60 mL    IV PiggyBack: 200 mL    Miscellaneous Tube Feedin mL    Sodium Chloride 0.9% Bolus: 250 mL  Total IN: 1460 mL    OUT:    Drain (mL): 190 mL    Voided (mL): 1500 mL  Total OUT: 1690 mL    Total NET: -230 mL      19 Aug 2021 07:01  -  19 Aug 2021 08:42  --------------------------------------------------------  IN:    Miscellaneous Tube Feedin mL  Total IN: 90 mL    OUT:    dextrose 5% + sodium chloride 0.45%: 0 mL  Total OUT: 0 mL    Total NET: 90 mL        ============================ LABS =========================                        8.6    15.41 )-----------( 220      ( 19 Aug 2021 01:02 )             27.6         130<L>  |  94<L>  |  8   ----------------------------<  178<H>  3.8   |  26  |  0.50    Ca    9.6      19 Aug 2021 01:02  Phos  2.6       Mg     1.7         TPro  7.5  /  Alb  3.1<L>  /  TBili  0.5  /  DBili  x   /  AST  20  /  ALT  39  /  AlkPhos  197<H>      LIVER FUNCTIONS - ( 19 Aug 2021 01:02 )  Alb: 3.1 g/dL / Pro: 7.5 g/dL / ALK PHOS: 197 U/L / ALT: 39 U/L / AST: 20 U/L / GGT: x             ABG - ( 19 Aug 2021 00:53 )  pH, Arterial: 7.43  pH, Blood: x     /  pCO2: 46    /  pO2: 162   / HCO3: 30    / Base Excess: 5.5   /  SaO2: 99.9                ======================Micro/Rad/Cardio=================  Culture: Reviewed   CXR: Reviewed  Echo:Reviewed  ======================================================  PAST MEDICAL & SURGICAL HISTORY:  CHF (congestive heart failure)    CAD (coronary artery disease)    Depression    Pleural effusion    History of  novel coronavirus disease (COVID-19)  2020    Hemorrhoids    Bleeding hemorrhoids    Peripheral arterial disease    Claudication    BPH with urinary obstruction    ACC/AHA stage D systolic heart failure    Anticoagulation goal of INR 2.0 to 2.5    Falls    Clavicle fracture    CKD (chronic kidney disease), stage III    Iron deficiency anemia    H/O epistaxis    Vertigo    GI bleed    S/P TVR (tricuspid valve repair)    S/P ventricular assist device    S/P endoscopy      ====================ASSESSMENT ==============  Stage D Nonischemic Cardiomyopathy, Status Post HM2 on 2017    Cardiogenic shock  Hemodynamic instability   Acute hypoxemic respiratory failure  GI bleed , Status Post Enteroscopy   Anemia, in setting of melena   Chronic Kidney Disease  Stress hyperglycemia   C.diff positive on    Hypovolemic shock  Septic shock  Leukocytosis    Plan:  ====================== NEUROLOGY=====================  Nonfocal, continue to monitor neuro status   Tylenol PRN for analgesia   Clonazepam for anxiety/sleep regimen   C/w mirtazapine for depression  Ambulate as tolerated   Will reconsult psychiatry to eval capacity for decision making        acetaminophen    Suspension .. 650 milliGRAM(s) Oral every 6 hours PRN Mild Pain (1 - 3)  clonazePAM  Tablet 0.25 milliGRAM(s) Oral at bedtime  metoclopramide Injectable 10 milliGRAM(s) IV Push every 8 hours  mirtazapine 7.5 milliGRAM(s) Oral daily    ==================== RESPIRATORY======================  Acute hypoxemic respiratory failure s/p #8 shiley trach on ; patient put back on assist control on  2/2 worsening of clinical condition, continue TC as tolerated (TC since )    Requiring intermittent full vent support, continue close monitoring of respiratory rate and breathing pattern, following of ABG’s with A-line monitoring, continuous pulse oximetry monitoring.     ====================CARDIOVASCULAR==================  Stage D Nonischemic Cardiomyopathy, Status Post HM2 on 2017; LVAD settings 9200, flow 4.8   TTE : reveals at least moderate MR, mild AI, severe global LV syst dysfxn, dec RV fxn   Continue invasive hemodynamic monitoring   Propranolol for rate control of HR 2/2 Hyperthyroidism, titrate as tolerated    propranolol 20 milliGRAM(s) Oral every 8 hours  ===================HEMATOLOGIC/ONC ===================  Acute blood loss anemia, monitor H&H/Plts    Continue monitor LDH daily   VTE prophylaxis w/ heparin    heparin   Injectable 5000 Unit(s) SubCutaneous every 8 hours  ===================== RENAL =========================  Continue to monitor I/Os, BUN/Creatinine, and urine output.   Goal net negative fluid balance. Replete lytes PRN. Keep K> 4 and Mg >2.     ==================== GASTROINTESTINAL===================  S/p cholecystostomy tube placed on : with IR with -60cc of black bile, will monitor site closely.   Recent emesis on  in setting of abdominal pain, as per GI likely component of illeus given critical illness   CTA A/P : Evaluation of the mesenteric vessels is limited by streak artifact from LVAD. There appears to be severe stenosis of the proximal SMA; abdominal mesenteric doppler is recommended for further evaluation. 2.  No small bowel findings to suggest acute mesenteric ischemia. 3.  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  Vascular surgery and IR consulted for potential SMA stent. Both teams state that SMA stent is not warranted as they don't believe SMA stenosis is as severe on CTA as read by radiology and do not believe that this stenosis is causing patient's intolerance to TFs  Mesenteric duplex on 8/15 borderline stenosis of proximal SMA   TwoCal HN feeds - patient refusing feeds, will consult palliative and ethics   Zofran PRN for nausea   Reglan for gut motility   Continue Protonix for stress ulcer prophylaxis.     dextrose 5% + sodium chloride 0.45%. 1000 milliLiter(s) (50 mL/Hr) IV Continuous <Continuous>  multivitamin 1 Tablet(s) Oral daily  pantoprazole  Injectable 40 milliGRAM(s) IV Push every 12 hours  thiamine 100 milliGRAM(s) Oral daily  metoclopramide Injectable 10 milliGRAM(s) IV Push every 8 hours  ondansetron Injectable 4 milliGRAM(s) IV Push every 6 hours PRN Nausea and/or Vomiting  =======================    ENDOCRINE  =====================  Stress hyperglycemia, continue glucose control with admelog sliding scale   Thyroid US to evaluate for thyroid nodule/goiter in setting of hyperthyroid  - contraindicated at this time 2/2 trach/will do when trach is out   C/w methimazole in setting of low TSH    insulin lispro (ADMELOG) corrective regimen sliding scale   SubCutaneous every 6 hours  methimazole 10 milliGRAM(s) Oral every 8 hours  ========================INFECTIOUS DISEASE================  Afebrile, Leukocytosis w/ wbc rising from 12.13 --> 15.41  Monitor temperature and trend WBC.   BC 1/2 +GPC in clusters, SC enterobacter(CRE). C/w cefepime and vancomycin     cefepime   IVPB      cefepime   IVPB 1000 milliGRAM(s) IV Intermittent every 8 hours  vancomycin    Solution 125 milliGRAM(s) Oral every 6 hours      Patient requires continuous monitoring with bedside rhythm monitoring, pulse ox monitoring, and intermittent blood gas analysis. Care plan discussed with ICU care team. Patient remained critical and at risk for life threatening decompensation.     By signing my name below, I, Emily Roa, attest that this documentation has been prepared under the direction and in the presence of CLAUDIA Lee   Electronically signed: Emily Roa Scribe, 21 @ 08:42    I, CLAUDIA Lee , personally performed the services described in this documentation. all medical record entries made by the scribe were at my direction and in my presence. I have reviewed the chart and agree that the record reflects my personal performance and is accurate and complete  Electronically signed: CLAUDIA Lee

## 2021-08-19 NOTE — PROGRESS NOTE ADULT - SUBJECTIVE AND OBJECTIVE BOX
[facility] NUTRITION SUPPORT-- FOLLOW UP NOTE  --------------------------------------------------------------------------------  24 hour events/subjective: pt seen and examined. resting in bed. states abd pain mildly better, some nausea, no vomiting. tolerating tf at goal at 45cc/hr per nursing without issue. received small ivf bolus overnight. pall care eval noted, rec psych eval for capacity     Diet:  Diet, NPO with Tube Feed:   Tube Feeding Modality: Nasogastric  TwoCal HN (TWOCALHNRTH)  Total Volume for 24 Hours (mL): 1080  Continuous  Starting Tube Feed Rate mL per Hour: 10  Increase Tube Feed Rate by (mL): 10     Every 6 hours  Until Goal Tube Feed Rate (mL per Hour): 45  Tube Feed Duration (in Hours): 24  Tube Feed Start Time: 13:25 (21 @ 13:37)      PAST HISTORY  --------------------------------------------------------------------------------  No significant changes to PMH, PSH, FHx, SHx, unless otherwise noted    ALLERGIES & MEDICATIONS  --------------------------------------------------------------------------------  Allergies    No Known Allergies    Intolerances      Standing Inpatient Medications  cefepime   IVPB      cefepime   IVPB 1000 milliGRAM(s) IV Intermittent every 8 hours  chlorhexidine 0.12% Liquid 15 milliLiter(s) Oral Mucosa every 12 hours  chlorhexidine 2% Cloths 1 Application(s) Topical <User Schedule>  clonazePAM  Tablet 0.25 milliGRAM(s) Oral at bedtime  dextrose 5% + sodium chloride 0.45%. 1000 milliLiter(s) IV Continuous <Continuous>  heparin   Injectable 5000 Unit(s) SubCutaneous every 8 hours  insulin lispro (ADMELOG) corrective regimen sliding scale   SubCutaneous every 6 hours  methimazole 10 milliGRAM(s) Oral every 8 hours  metoclopramide Injectable 10 milliGRAM(s) IV Push every 8 hours  mirtazapine 7.5 milliGRAM(s) Oral daily  multivitamin 1 Tablet(s) Oral daily  pantoprazole  Injectable 40 milliGRAM(s) IV Push every 12 hours  propranolol 20 milliGRAM(s) Oral every 8 hours  thiamine 100 milliGRAM(s) Oral daily  vancomycin    Solution 125 milliGRAM(s) Oral every 6 hours    PRN Inpatient Medications  acetaminophen    Suspension .. 650 milliGRAM(s) Oral every 6 hours PRN  ondansetron Injectable 4 milliGRAM(s) IV Push every 6 hours PRN        REVIEW OF SYSTEMS  --------------------------------------------------------------------------------    General:  as per hpi  HEENT:  no headaches, no vision changes, no ear pain  CV:  no chest pain, no palpitations  Resp:  see hpi  GI:  as per hpi  :  no pain, no bleeding, no dysuria  Muscle:  no pain, no weakness  Neuro:  no numbness, no tingling, no memory problems  Psych:  no insomnia  Endocrine:  no cold/heat intolerance  Heme: no ecchymosis, no easy bruising  Skin:  no rash, no edema    all other systems were reviewed and are negative, except as noted         VITALS/PHYSICAL EXAM  --------------------------------------------------------------------------------  T(C): 36.7 (21 @ 08:00), Max: 37.3 (21 @ 20:00)  HR: 90 (21 @ 08:00) (90 - 114)  BP: --  RR: 20 (21 @ 08:00) (0 - 35)  SpO2: 99% (21 @ 08:00) (93% - 100%)  Wt(kg): --      21 @ 07:01  -  21 @ 07:00  --------------------------------------------------------  IN: 1460 mL / OUT: 1690 mL / NET: -230 mL    21 @ 07:01  -  21 @ 09:01  --------------------------------------------------------  IN: 90 mL / OUT: 0 mL / NET: 90 mL      I&O's Detail    18 Aug 2021 07:01  -  19 Aug 2021 07:00  --------------------------------------------------------  IN:    dextrose 5% + sodium chloride 0.45%: 200 mL    Enteral Tube Flush: 60 mL    IV PiggyBack: 200 mL    Miscellaneous Tube Feedin mL    Sodium Chloride 0.9% Bolus: 250 mL  Total IN: 1460 mL    OUT:    Drain (mL): 190 mL    Voided (mL): 1500 mL  Total OUT: 1690 mL    Total NET: -230 mL      19 Aug 2021 07:01  -  19 Aug 2021 09:01  --------------------------------------------------------  IN:    Miscellaneous Tube Feedin mL  Total IN: 90 mL    OUT:    dextrose 5% + sodium chloride 0.45%: 0 mL  Total OUT: 0 mL    Total NET: 90 mL        Physical Exam:  Gen: lying in bed, frail  HEENT: +trach +ngt   Chest: respirations non labored  Abd: soft nt nd perc c-tube in place   LE: no edema  Neuro: awake alert responsive      LABS/STUDIES  --------------------------------------------------------------------------------              8.6    15.41 >-----------<  220      [21 @ 01:02]              27.6     130  |  94  |  8   ----------------------------<  178      [21 @ 01:02]  3.8   |  26  |  0.50        Ca     9.6     [21 @ 01:02]      Mg     1.7     [21 @ 01:02]      Phos  2.6     [21 @ 01:02]    TPro  7.5  /  Alb  3.1  /  TBili  0.5  /  DBili  x   /  AST  20  /  ALT  39  /  AlkPhos  197  [21 @ 01:02]              [21 @ 00:31]    Ca ionizedBlood Gas Arterial - Calcium, Ionized: 1.25 mmol/L (21 @ 00:53)  Blood Gas Arterial - Calcium, Ionized: 1.25 mmol/L (21 @ 00:29)    Creatinine Trend:  POC glucoseGlucose, Serum: 178 mg/dL (21 @ 01:02)  CAPILLARY BLOOD GLUCOSE      POCT Blood Glucose.: 155 mg/dL (19 Aug 2021 05:21)  POCT Blood Glucose.: 163 mg/dL (19 Aug 2021 00:46)  POCT Blood Glucose.: 114 mg/dL (18 Aug 2021 17:57)  POCT Blood Glucose.: 103 mg/dL (18 Aug 2021 13:19)    PrealbuminPrealbumin, Serum: 11 mg/dL (21 @ 04:01)  Prealbumin, Serum: 10 mg/dL (08-15-21 @ 13:53)    Triglycerides

## 2021-08-19 NOTE — PROGRESS NOTE ADULT - ASSESSMENT
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 coli UTIs, driveline drainage (1/7/21) and COVID-19, here initially for GIB prolonged hospital course, s/p tracheostomy, acalculous cholecystitis s/p perc dawn. Patient has persistent intermittent abdominal pain. Per CTU team, pt has recently been refusing tube feeds and is being evaluated for chronic mesenteric ischemia vs SMA syndrome.  8/13 bld cxs positive. Consulted for TPN. Prealb 11, prior a1c 5.6, tg 141. 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     -now tolerating ngt feeds at goal; cont as per CTU  -no plan to initiate TPN at this time, will reassess need pending clinical course  -suspect uncontrolled hyperthyroidism contributing to metabolic issues; restarted on methimazole 8/17; endocrine following  -abdominal pain- some improvement, gi/vascular/rheum input noted  -bacteremia- cont abx per id  -anemia - rec iron supp when feasible (hold in the setting of infection)  -hyponatremia- rec sending urine lytes if cont to downtrend  -hypokalemia/phosphatemia/magnesemia- repletions as per primary  -pall care input noted, planned for psych eval for capacity  -management per CTU, will follow and remain available as needed    plan dw with Dr. Soliz and Dr. IVAN Tang, agreeable with above    TPN pager [phone], spectra [phone]  M-F 6A-4P, Weekends and holidays 8A-12P   66M PMH ACC/AHA stage D HF due to NICM HM2 LVAD , TV annuloplasty ring 9/12/17 as destination therapy due to severe peripheral artery disease with significant stenosis  SIADH, Depression, CKD-3 with hyperkalemia, past E. coli UTIs, driveline drainage (1/7/21) and COVID-19, here initially for GIB prolonged hospital course, s/p tracheostomy, acalculous cholecystitis s/p perc dawn. Patient has persistent intermittent abdominal pain. Per CTU team, pt has recently been refusing tube feeds and is being evaluated for chronic mesenteric ischemia vs SMA syndrome.  8/13 bld cxs positive. Consulted for TPN. Prealb 11, prior a1c 5.6, tg 141. 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     -now tolerating ngt feeds at goal; cont as per CTU  -no plan to initiate TPN at this time, will reassess need pending clinical course  -suspect uncontrolled hyperthyroidism contributing to metabolic issues; restarted on methimazole 8/17; endocrine following  -abdominal pain- some improvement, gi/vascular/rheum input noted  -anemia - rec iron supp when feasible (hold in the setting of infection)  -hyponatremia- rec sending urine lytes if cont to downtrend  -hypokalemia/phosphatemia/magnesemia- repletions as per primary  -pall care input noted, planned for psych eval for capacity  -management per CTU, will follow and remain available as needed    plan dw with Dr. Soliz and Dr. IVAN Tang, agreeable with above    TPN pager [phone], spectra [phone]  M-F 6A-4P, Weekends and holidays 8A-12P   S1-S2

## 2021-08-19 NOTE — PROGRESS NOTE ADULT - ASSESSMENT
Assessment  Hyperthyroidism: 66y Male with no history thyroid disease, was not on any thyroid supplements, previously euthyroid (June 2021), was on amiodarone (last dose 8/7), now in hyperthyroid state, Hashimotos, started on Methimazole 10mg TID and propanolol for tachycardia, received prednisone doses (now DC'd), Methimazole restarted once LFTs improved, LFTs fluctuating, remains hyperthyroid.  Hyperglycemia: Patient in nondiabetic range, A1C 5.6%, now on insulin coverage, blood sugars in acceptable range, no hypoglycemias, on TFs.  CHF: on medications, stable, monitored.  Anemia: Monitored      Pavan Barone MD  Cell: [phone]  Office: [phone]       Assessment  Hyperthyroidism: 66y Male with no history thyroid disease, was not on any thyroid supplements, previously euthyroid (June 2021), was on amiodarone (last dose 8/7), now in hyperthyroid state, Hashimotos, started on Methimazole  10mg TID and propanolol for tachycardia, received prednisone doses (now DC'd), Methimazole restarted once LFTs improved, LFTs fluctuating, remains hyperthyroid.  Hyperglycemia: Patient in nondiabetic range, A1C 5.6%, now on insulin coverage, blood sugars in acceptable range, no hypoglycemias, on TFs.  CHF: on medications, stable, monitored.  Anemia: Monitored      Pavan Barone MD  Cell: [phone]  Office: [phone]

## 2021-08-20 NOTE — PROGRESS NOTE ADULT - ASSESSMENT
Assessment and Recommendation:   · Assessment	  Assessment and recommendation :  Recurrent Acute hypoxic respiratory Failure S/P tracheostomy on trach. collar  50% FI02,   Acute blood loss anemia S/P multiple  blood transfusion   S/P septic shock off vasopressin , levophed and off  Dobutamine   S/P sepsis with serratia marcescens in the blood S/P  cefepime  S/P cholecystostomy tube placement by IR    AF RVR back to regular sinus Rhythm   Non ischemic cardiomyopathy continue ACE inhibitor and B-Blockers   S/P Septic shock and cardiogenic shock   Stage D systolic heart failure S/P LVAD HM2   MH2 LVAD  with  TV Annuloplasty  Severe peripheral vascular disease   no evidence of significant mesenteric thrombosis   severe hyperglycemia on insulin coverage    critical care polyneuropathy   Anemia of Acute blood Loss   severe protein caloric malnutrition   S/P Small bowel bleeding   S/P blood and FFP transfusion   Chronic kidney disease stage III  resume  NGT feeding.  psychiatric evaluation   GI prophylaxis with PPI

## 2021-08-20 NOTE — PROGRESS NOTE ADULT - SUBJECTIVE AND OBJECTIVE BOX
INFECTIOUS DISEASES FOLLOW UP-- Anitra Prater  [phone]    This is a follow up note for this  66yMale with  Anemia    Acute cholecystitis        ROS:  CONSTITUTIONAL:  No fever, good appetite  CARDIOVASCULAR:  No chest pain or palpitations  RESPIRATORY:  No dyspnea  GASTROINTESTINAL:  No nausea, vomiting, diarrhea, or abdominal pain  GENITOURINARY:  No dysuria  NEUROLOGIC:  No headache,     Allergies    No Known Allergies    Intolerances        ANTIBIOTICS/RELEVANT:  antimicrobials  cefepime   IVPB      cefepime   IVPB 1000 milliGRAM(s) IV Intermittent every 8 hours  vancomycin    Solution 125 milliGRAM(s) Oral every 6 hours    immunologic:    OTHER:  acetaminophen    Suspension .. 650 milliGRAM(s) Oral every 6 hours PRN  chlorhexidine 0.12% Liquid 15 milliLiter(s) Oral Mucosa every 12 hours  chlorhexidine 2% Cloths 1 Application(s) Topical <User Schedule>  heparin   Injectable 5000 Unit(s) SubCutaneous every 8 hours  insulin lispro (ADMELOG) corrective regimen sliding scale   SubCutaneous every 6 hours  methimazole 10 milliGRAM(s) Oral every 8 hours  metoclopramide Injectable 10 milliGRAM(s) IV Push every 8 hours  mirtazapine 7.5 milliGRAM(s) Oral daily  multivitamin 1 Tablet(s) Oral daily  ondansetron Injectable 4 milliGRAM(s) IV Push every 6 hours PRN  pantoprazole  Injectable 40 milliGRAM(s) IV Push every 12 hours  propranolol 20 milliGRAM(s) Oral every 8 hours  thiamine 100 milliGRAM(s) Oral daily      Objective:  Vital Signs Last 24 Hrs  T(C): 37.6 (20 Aug 2021 12:00), Max: 37.6 (20 Aug 2021 12:00)  T(F): 99.7 (20 Aug 2021 12:00), Max: 99.7 (20 Aug 2021 12:00)  HR: 111 (20 Aug 2021 19:00) (91 - 126)  BP: --  BP(mean): 84 (19 Aug 2021 23:20) (84 - 84)  RR: 32 (20 Aug 2021 19:00) (15 - 48)  SpO2: 99% (20 Aug 2021 19:00) (90% - 100%)    PHYSICAL EXAM:  Constitutional:no acute distress  Eyes:ALONSO, EOMI  Ear/Nose/Throat: no oral lesions, 	  Respiratory: clear BL  Cardiovascular: S1S2  Gastrointestinal:soft, (+) BS, no tenderness  Extremities:no e/e/c  No Lymphadenopathy  IV sites not inflammed.    LABS:                        9.1    15.77 )-----------( 245      ( 20 Aug 2021 00:37 )             29.0     08-20    129<L>  |  91<L>  |  9   ----------------------------<  88  3.8   |  26  |  0.54    Ca    10.2      20 Aug 2021 00:37  Phos  3.5     08-20  Mg     1.6     08-20    TPro  8.2  /  Alb  3.4  /  TBili  0.6  /  DBili  x   /  AST  22  /  ALT  37  /  AlkPhos  190<H>  08-20          MICROBIOLOGY:            RECENT CULTURES:  08-15 @ 04:15  .Blood Blood-Peripheral  --  --  --    No Growth Final  --  08-15 @ 04:13  .Blood Blood-Peripheral  --  --  --    No Growth Final  --  08-14 @ 07:22  .Blood Blood-Peripheral  --  --  --    No Growth Final  --  08-14 @ 01:33  .Sputum Sputum  Enterobacter cloacae (Carbapenem Resistant)  Enterobacter cloacae (Carbapenem Resistant)  CLAU    Numerous Enterobacter cloacae complex (Carbapenem Resistant)  Normal Respiratory Bria present  --  08-13 @ 23:00  .Blood Blood-Peripheral  --  --  --    Growth in aerobic and anaerobic bottles: Staphylococcus epidermidis  Coag Negative Staphylococcus  Single set isolate, possible contaminant. Contact  Microbiology if susceptibility testing clinically  indicated.  **BCID performed. No targets detected.**  ***Blood Panel PCR results on this specimen are available  approximately 3 hours after the Gram stain result.***  Gram stain, PCR, and/or culture results may not always  correspond due to difference in methodologies.  ************************************************************  This PCR assay was performed by multiplex PCR. This  Assay tests for 66 bacterial and resistance gene targets.  Please refer to the [facility] test directory  at [email or web address] for details.  --      RADIOLOGY & ADDITIONAL STUDIES: INFECTIOUS DISEASES FOLLOW UP-- Anitra Prater  [phone]    This is a follow up note for this  66yMale with  Anemia  LVAD  enterobacter infection        ROS:  CONSTITUTIONAL:  awkae, alert, c/o abdominal pains wiht feedings    Allergies    No Known Allergies    Intolerances        ANTIBIOTICS/RELEVANT:  antimicrobials  cefepime   IVPB      cefepime   IVPB 1000 milliGRAM(s) IV Intermittent every 8 hours  vancomycin    Solution 125 milliGRAM(s) Oral every 6 hours    immunologic:    OTHER:  acetaminophen    Suspension .. 650 milliGRAM(s) Oral every 6 hours PRN  chlorhexidine 0.12% Liquid 15 milliLiter(s) Oral Mucosa every 12 hours  chlorhexidine 2% Cloths 1 Application(s) Topical <User Schedule>  heparin   Injectable 5000 Unit(s) SubCutaneous every 8 hours  insulin lispro (ADMELOG) corrective regimen sliding scale   SubCutaneous every 6 hours  methimazole 10 milliGRAM(s) Oral every 8 hours  metoclopramide Injectable 10 milliGRAM(s) IV Push every 8 hours  mirtazapine 7.5 milliGRAM(s) Oral daily  multivitamin 1 Tablet(s) Oral daily  ondansetron Injectable 4 milliGRAM(s) IV Push every 6 hours PRN  pantoprazole  Injectable 40 milliGRAM(s) IV Push every 12 hours  propranolol 20 milliGRAM(s) Oral every 8 hours  thiamine 100 milliGRAM(s) Oral daily      Objective:  Vital Signs Last 24 Hrs  T(C): 37.6 (20 Aug 2021 12:00), Max: 37.6 (20 Aug 2021 12:00)  T(F): 99.7 (20 Aug 2021 12:00), Max: 99.7 (20 Aug 2021 12:00)  HR: 111 (20 Aug 2021 19:00) (91 - 126)  BP: --  BP(mean): 84 (19 Aug 2021 23:20) (84 - 84)  RR: 32 (20 Aug 2021 19:00) (15 - 48)  SpO2: 99% (20 Aug 2021 19:00) (90% - 100%)    PHYSICAL EXAM:  Constitutional:no acute distress  Eyes:ALONSO, EOMI  Ear/Nose/Throat: no oral lesions, trqchs ite with sputum production, clear	  Respiratory: audible bilat decreased left base  Cardiovascular: S1S2  Gastrointestinal:soft, cholecystotomy tube draining bilious material  Extremities:no e/e/c  No Lymphadenopathy  IV sites not inflammed.    LABS:                        9.1    15.77 )-----------( 245      ( 20 Aug 2021 00:37 )             29.0     08-20    129<L>  |  91<L>  |  9   ----------------------------<  88  3.8   |  26  |  0.54    Ca    10.2      20 Aug 2021 00:37  Phos  3.5     08-20  Mg     1.6     08-20    TPro  8.2  /  Alb  3.4  /  TBili  0.6  /  DBili  x   /  AST  22  /  ALT  37  /  AlkPhos  190<H>  08-20          MICROBIOLOGY:            RECENT CULTURES:  08-15 @ 04:15  .Blood Blood-Peripheral  --  --  --    No Growth Final  --  08-15 @ 04:13  .Blood Blood-Peripheral  --  --  --    No Growth Final  --  08-14 @ 07:22  .Blood Blood-Peripheral  --  --  --    No Growth Final  --  08-14 @ 01:33  .Sputum Sputum  Enterobacter cloacae (Carbapenem Resistant)  Enterobacter cloacae (Carbapenem Resistant)  CLAU    Numerous Enterobacter cloacae complex (Carbapenem Resistant)  Normal Respiratory Bria present  --  08-13 @ 23:00  .Blood Blood-Peripheral  --  --  --    Growth in aerobic and anaerobic bottles: Staphylococcus epidermidis  Coag Negative Staphylococcus  Single set isolate, possible contaminant. Contact  Microbiology if susceptibility testing clinically  indicated.  **BCID performed. No targets detected.**  ***Blood Panel PCR results on this specimen are available  approximately 3 hours after the Gram stain result.***  Gram stain, PCR, and/or culture results may not always  correspond due to difference in methodologies.  ************************************************************  This PCR assay was performed by multiplex PCR. This  Assay tests for 66 bacterial and resistance gene targets.  Please refer to the [facility] test directory  at [email or web address] for details.  --      RADIOLOGY & ADDITIONAL STUDIES:    < from: Xray Chest 1 View- PORTABLE-Routine (Xray Chest 1 View- PORTABLE-Routine in AM.) (08.20.21 @ 02:45) >  IMPRESSION:    Patchy atelectasis at the right lung base.    < end of copied text >

## 2021-08-20 NOTE — PROGRESS NOTE ADULT - SUBJECTIVE AND OBJECTIVE BOX
Chief Complaint:  Patient is a 66y old  Male who presents with a chief complaint of Anemia, Supratherapeutic INR, Dark Stools (20 Aug 2021 15:22)      Interval Events: continues to intermittently refuse tube feeds, was previously on 45cc/hr; had to stop as patient was pulling at tube  - not answering questions today      Hospital Medications:  acetaminophen    Suspension .. 650 milliGRAM(s) Oral every 6 hours PRN  cefepime   IVPB      cefepime   IVPB 1000 milliGRAM(s) IV Intermittent every 8 hours  chlorhexidine 0.12% Liquid 15 milliLiter(s) Oral Mucosa every 12 hours  chlorhexidine 2% Cloths 1 Application(s) Topical <User Schedule>  heparin   Injectable 5000 Unit(s) SubCutaneous every 8 hours  insulin lispro (ADMELOG) corrective regimen sliding scale   SubCutaneous every 6 hours  methimazole 10 milliGRAM(s) Oral every 8 hours  metoclopramide Injectable 10 milliGRAM(s) IV Push every 8 hours  mirtazapine 7.5 milliGRAM(s) Oral daily  multivitamin 1 Tablet(s) Oral daily  ondansetron Injectable 4 milliGRAM(s) IV Push every 6 hours PRN  pantoprazole  Injectable 40 milliGRAM(s) IV Push every 12 hours  propranolol 20 milliGRAM(s) Oral every 8 hours  thiamine 100 milliGRAM(s) Oral daily  vancomycin    Solution 125 milliGRAM(s) Oral every 6 hours      PMHX/PSHX:  No pertinent past medical history    CHF (congestive heart failure)    CAD (coronary artery disease)    Depression    Pleural effusion    History of 2019 novel coronavirus disease (COVID-19)    Hemorrhoids    Bleeding hemorrhoids    Peripheral arterial disease    Claudication    BPH with urinary obstruction    ACC/AHA stage D systolic heart failure    Anticoagulation goal of INR 2.0 to 2.5    Falls    Clavicle fracture    CKD (chronic kidney disease), stage III    Iron deficiency anemia    H/O epistaxis    Vertigo    GI bleed    No significant past surgical history    S/P TVR (tricuspid valve repair)    S/P ventricular assist device    S/P endoscopy            ROS: not responding to questions      PHYSICAL EXAM:   GENERAL:  chronically ill appearing, no distress  HEENT:  NC/AT,  conjunctivae clear, NGT In place  CHEST:  Full & symmetric excursion, no increased effort w/ respirations  HEART:  Regular rhythm & rate  ABDOMEN:  Soft, non-distended, nontender to diffuse palpation, perc tube in place  EXTREMITIES:  no LE  edema  SKIN:  No rash/erythema  NEURO:  Alert, oriented    Vital Signs:  Vital Signs Last 24 Hrs  T(C): 36.6 (20 Aug 2021 08:00), Max: 37.4 (19 Aug 2021 23:00)  T(F): 97.9 (20 Aug 2021 08:00), Max: 99.3 (19 Aug 2021 23:00)  HR: 109 (20 Aug 2021 16:00) (91 - 126)  BP: --  BP(mean): 84 (19 Aug 2021 23:20) (84 - 84)  RR: 35 (20 Aug 2021 16:00) (15 - 48)  SpO2: 96% (20 Aug 2021 16:00) (90% - 100%)  Daily     Daily     LABS:                        9.1    15.77 )-----------( 245      ( 20 Aug 2021 00:37 )             29.0     08-20    129<L>  |  91<L>  |  9   ----------------------------<  88  3.8   |  26  |  0.54    Ca    10.2      20 Aug 2021 00:37  Phos  3.5     08-20  Mg     1.6     08-20    TPro  8.2  /  Alb  3.4  /  TBili  0.6  /  DBili  x   /  AST  22  /  ALT  37  /  AlkPhos  190<H>  08-20    LIVER FUNCTIONS - ( 20 Aug 2021 00:37 )  Alb: 3.4 g/dL / Pro: 8.2 g/dL / ALK PHOS: 190 U/L / ALT: 37 U/L / AST: 22 U/L / GGT: x                   Imaging:

## 2021-08-20 NOTE — PROGRESS NOTE ADULT - ASSESSMENT
66M Hx stage D NICM s/p HM2 on 9/2017 as DT (due to severe PAD) with TV ring, prior COVID-19 infection 4/2020, recurrent syncope post LVAD s/p ILR, and chronic abdominal pain with prior negative workup who was admitted with symptomatic anemia and supratherapeutic INR.     #Abd pain: intermittent and vacillating history regarding abdominal pain (reports location at times at per dawn/LVAD site, other times epigastric region, notes left sided pain many years prior // endorses chronicity possibly many years prior vs noted to be possibly x 1 week according to team // unclear if associated with TF). Now having brown BMs, no further recent bleeding. Ddx unclear source of pain -- possibly MSK at site of perc dawn drain/LVAD pump vs can consider esophagitis in setting of long standing NGT vs IBS vs unlikely biliary in nature (perc dawn draining well, normal LFTs, no collection on imaging) vs gastroparesis/ileus vs unlikely chronic mesenteric ischemia (vessels patent on imaging) vs unlikely SMA syndrome (borderline stenosis on doppler)  # Acute blood loss anemia and melena - resolved, Likely had recurrent GI bleed this admission. Initial melena workup lead to capsule endoscopy on 6/14/2021 which revealed active bleeding in small bowel.  Single balloon 6/15/2021 revealed old blood in proximal ileum. 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  # LVAD on anticoagulation with coumadin  # Trach  # Tension pneumo s/p chest tube placement 7/26  # Septic shock - 2/2 serratia bacteremia 2/2 cholecystitis  # Acalculous cholecystitis - s/p perc dawn  #S/p Cdiff infection    Recommendations:  - c/w PPI BID  - c/w with TF as patient allows, with TID reglan to help with motility given critical illness and likely component of gastroparesis  - no further GI w/u needed at this time, please call back with questions      Susan Jacobo PGY-5  Gastroenterology Fellow  Pager #[phone] ([facility]) or [phone] (NS)  Available on Microsoft Teams.  Please contact on-call GI fellow via answering service ([phone]) after 5pm and before 8am, and on weekends.

## 2021-08-20 NOTE — PROGRESS NOTE ADULT - ASSESSMENT
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 coli UTIs, driveline drainage (1/7/21) and COVID-19 (back in April 2020)  He was recently seen in clinic where he complained of abdominal pain and dark stools w constipation back in May. He presents to [facility] ER today weakness and fatigue, moderate and + Black stools for three days, on coumadin secondary to warfarin use in the setting of an LVAD. Patient has required transfusions for GIB in the past. Mostly recently back in jan 2021 pt had anemia with dark stools. No interventions was done at that time. However Last Endoscopy was done in July 2020 (negative). Today labs show patient is anemic with H/H of 4.5/16.3,. INR is 8.84 MAP in the 90s,   Returned from endoscopy appearing ill tachypneic with chills with new white out of his left lung    Remains with complaints of abdominal pain  leukocytosis  blood cultures growing gram + cocci- with Staph epidermidis in a single set- likely contaminant  sputum from trach site with many enterobacter Carbapenem resistant strain  Will plan to treat with Cefepime x 7days  Treat for CR enterobacter VAP         Morris Prater MD  [phone]  After 5pm/weekends [phone]

## 2021-08-20 NOTE — PROGRESS NOTE ADULT - ASSESSMENT
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 coli UTIs, driveline drainage (1/7/21) and COVID-19, here initially for GIB prolonged hospital course, s/p tracheostomy, acalculous cholecystitis s/p perc dawn. Patient has persistent intermittent abdominal pain. Per CTU team, pt has recently been refusing tube feeds and is being evaluated for chronic mesenteric ischemia vs SMA syndrome.  8/13 bld cxs positive. Consulted for TPN. Prealb 11, prior a1c 5.6, tg 141. 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     -tube feeds per CTU team  -no plan to initiate TPN at this time, will reassess need pending clinical course  -suspect uncontrolled hyperthyroidism contributing to metabolic issues; restarted on methimazole 8/17; endocrine following  -abdominal pain- some improvement, gi/vascular/rheum input noted  -anemia - rec iron supp when feasible   -hyponatremia- rec sending urine lytes if cont to downtrend  -hypokalemia/phosphatemia/magnesemia- repletions as per primary  -psych and palliative care input noted  -management per CTU, will follow and remain available as needed    TPN pager [phone], spectra [phone]  M-F 6A-4P, Weekends and holidays 8A-12P  discussed with Dr. Soliz and Dr. IVAN Tang

## 2021-08-20 NOTE — PROGRESS NOTE ADULT - SUBJECTIVE AND OBJECTIVE BOX
RICKY JOINT  MRN#: [id number]  Subjective:  Pulmonary progress  : recurrent Acute hypoxic respiratory Failure ,aspiration pneumonia, NICM  , chart reviewed and H/O obtained radiological and Laboratory study reviewed patient Examined     65M PMH ACC/AHA stage D HF due to NICM HM2 LVAD , TV annuloplasty ring 17 as destination therapy due to severe peripheral artery disease with significant stenosis  SIADH, Depression, CKD-3 with hyperkalemia, past E. coli UTIs, driveline drainage (21) and COVID-19 (back in 2020)  He was recently seen in clinic where he complained of abdominal pain and dark stools w constipation back in May. He presents to [facility] ER today weakness and fatigue, moderate and + Black stools for three days, on coumadin secondary to warfarin use in the setting of an LVAD. Patient has required transfusions for GIB in the past. Mostly recently back in 2021 pt had anemia with dark stools. No interventions was done at that time. However Last Endoscopy was done in 2020 (negative). Today labs show patient is anemic with H/H of 4.5/16.3,. INR is 8.84 MAP in the 90s, Temp 35.1. He denies any chest pain, shortness of breath, dizziness, abd pain, nausea or vomiting. found to have  rectal bleeding underwent endoscopy ,old blood in the proximal ileum ,  develop sepsis with LL opacity given Antibiotics , Extubated , reintubated , Bronchoscopy on Zosyn for LL pneumonia  and Amiodarone S/P TV Annuloplasty , patient remain intubated on full ventilatory support .S/P multiple units of blood transfusion , remain on full ventilatory support on Precedex and propofol , new central IJ line , diarrhea C diff. +ve on po vancomycin and IV Flagyl,  mildly distended belly , fever start on cefepime 2gm q 8 hrs S/P tracheostomy .  new RT Subclavian central line continue on contact  isolation ,still diarrhea on C-diff antibiotics ENT follow up appreciated , trial of C-PAP as tolerated , , copious secretion from trach. site chest x ray left lower lobe pneumonia , tolerating trch. collar 50% FI02 still excessive secretion need pulmonary toilet , off Ancef antibiotic , no more diarrhea back on full support mechanical ventilator , chest x ray show improvement in LLL air space disease, more awake and responsive on tube feeding no more diarrhea , S/P  Ancef for bacteremia no fever ,ID follow up noted ,  no nausea or vomiting or diarrhea still very weak and tired , event note pulled NG tube now replaced , back on tube feeding ,still on po vancomycin , getting PT and OT at bed side , no plan for decannulation for now. , no more diarrhea receiving PT and OPT at bed side , minimal secretion from tracheostomy site , no SOB getting stronger , improve muscle tone patient transfer to monitor bed still on contact isolation for C-Difficel colitis on 50% FI02, NG tube clogged , and change to resume tube feeding still loose stool . H/H drop significantly require blood transfusion , most likely GI bleeding , IV heparin D/C , vomiting 200 cc of creamy color tube feeding on hold no sob, still melena monitor in the CTU possible capsule endoscopy , H/H is stable ., patient develop TR sided  pneumothorax require chest tube placement , RT IJ central line  placed , develop fever shaking chills , blood culture positive for serratia marcescens , start on cefepime .the patient  become hypoxic and hypotensive placed on full ventilatory support and Vasopressin , levophed and Dobutamine ,S/P blood transfusion on meropenem and vancomycin , IR note appreciated   , on and  off pressors , occasional agitation on Precedex .S/P IR cholecystostomy tube drainage placement in the RT upper Quadrant , resume anticoagulation chest x ray noted C-PAP trail lasted only for 2 hrs , new RT SC line and D/C RT IJ line , RT pig tail cathter has been removed , tolerating C-PAP trial placed on trach. collar 50% FI02 GI consultant noted on NG tube feeding as tolerated , develop AF RVR S/P  bolus Amiodarone  back to regular sinus Rhythm , Flat Affect depressed , back on tube feeding Vital AF at 60 cc/ hr .still intermittent abdominal pain , no fever saturation is accepted  back on full ventilatory support , tired and sleepy on round   Dark stool evaluated by Vascular service recommend mesenteric Duplex. new Rt IJ line , C-PAP trial to wean off ventilator to wean to trah.collar 50% FI02, again refuse NG tube feeding , no plan for TPN , psychiatric evaluation for competence , palliative care note appreciated . vascular mesenteric Dopplex noted no evidence of severe  mesenteric arteries thrombosis .start on  tube feeding seen by Palliative care determined full capacity         (2021 16:57)    PAST MEDICAL & SURGICAL HISTORY:  CHF (congestive heart failure)    CAD (coronary artery disease)  Depression    Pleural effusion    History of 2019 novel coronavirus disease (COVID-19)  2020    Hemorrhoids    Bleeding hemorrhoids    Peripheral arterial disease    Claudication    BPH with urinary obstruction    ACC/AHA stage D systolic heart failure  Anticoagulation goal of INR 2.0 to 2.5    Falls    Clavicle fracture    CKD (chronic kidney disease), stage III    Iron deficiency anemia    H/O epistaxis    Vertigo    GI bleed    S/P TVR (tricuspid valve repair)    S/P ventricular assist device    S/P endoscopy    OBJECTIVE:  ICU Vital Signs Last 24 Hrs  T(C): 38.2 (2021 10:00), Max: 38.5 (2021 12:00)  T(F): 100.8 (2021 10:00), Max: 101.3 (2021 12:00)  HR: 65 (2021 10:00) (61 - 69)  BP: --  BP(mean): --  ABP: 105/67 (2021 10:00) (90/54 - 113/64)  ABP(mean): 77 (2021 10:00) (63 - 77)  RR: 20 (2021 10:00) (19 - 35)  SpO2: 99% (2021 10:00) (96% - 100%)       @ 07: @ 07:00  --------------------------------------------------------  IN: 2693.9 mL / OUT: 1415 mL / NET: 1278.9 mL     @ 07: @ 10:49  --------------------------------------------------------  IN: 420.8 mL / OUT: 115 mL / NET: 305.8 mL  PHYSICAL EXAM:Daily   Elderly male S/P tracheostomy on trach. collar 50% FI02 ,  Daily Weight in k.4 (2021 00:00)  HEENT:     + NCAT  + EOMI  - Conjuctival edema   - Icterus   - Thrush   - ETT  + NGT/OGT  Neck:         + FROM  RT IJ  line JVD  - Nodes - Masses + Mid-line trachea + Tracheostomy  Chest:            normal A-P diameter    Lungs:          + CTA   + Rhonchi    - Rales    - Wheezing + Decreased  LT BS   - Dullness R L  Cardiac:       + S1 + S2    + RRR   - Irregular   - S3  - S4    - Murmurs   - Rub   - Hamman’s sign   Abdomen:    + BS  + Soft + Non-tender - Distended - Organomegaly - PEG .cholecystostomy tube in place  Extremities:   - Cyanosis U/L   - Clubbing  U/L  + LE/UE Edema   + Capillary refill    + Pulses   Neuro:        - Awake   -  Alert   - Confused   - Lethargic   + Sedated  + Generalized Weakness  Skin:        - Rashes    - Erythema   + Normal incisions   + IV sites intact          HOSPITAL MEDICATIONS: All medications reviewed and analyzed  MEDICATIONS  (STANDING):  amiodarone    Tablet 200 milliGRAM(s) Oral daily  chlorhexidine 0.12% Liquid 15 milliLiter(s) Oral Mucosa every 12 hours  chlorhexidine 2% Cloths 1 Application(s) Topical <User Schedule>  dexmedetomidine Infusion 0.5 MICROgram(s)/kG/Hr (9.81 mL/Hr) IV Continuous <Continuous>  dextrose 50% Injectable 50 milliLiter(s) IV Push every 15 minutes  heparin  Infusion 400 Unit(s)/Hr (12.5 mL/Hr) IV Continuous <Continuous>  Hydromorphone  Injectable 0.5 milliGRAM(s) IV Push once  insulin lispro (ADMELOG) corrective regimen sliding scale   SubCutaneous every 6 hours  pantoprazole  Injectable 40 milliGRAM(s) IV Push every 12 hours  piperacillin/tazobactam IVPB.. 3.375 Gram(s) IV Intermittent every 8 hours  propofol Infusion 20 MICROgram(s)/kG/Min (9.42 mL/Hr) IV Continuous <Continuous>  sodium chloride 0.9% lock flush 3 milliLiter(s) IV Push every 8 hours  sodium chloride 0.9%. 1000 milliLiter(s) (10 mL/Hr) IV Continuous <Continuous>    MEDICATIONS  (PRN):  acetaminophen    Suspension .. 650 milliGRAM(s) Oral every 6 hours PRN Temp greater or equal to 38C (100.4F)    LABS: All Lab data reviewed and analyzed                        9.1    15.77 )-----------( 245      ( 20 Aug 2021 00:37 )             29.0   08-20    129<L>  |  91<L>  |  9   ----------------------------<  88  3.8   |  26  |  0.54    Ca    10.2      20 Aug 2021 00:37  Phos  3.5     08-20  Mg     1.6     08-    TPro  8.2  /  Alb  3.4  /  TBili  0.6  /  DBili  x   /  AST  22  /  ALT  37  /  AlkPhos  190<H>  -      Ca    9.6      19 Aug 2021 01:02  Phos  2.6     08-19  Mg     1.7     -    TPro  7.5  /  Alb  3.1<L>  /  TBili  0.5  /  DBili  x   /  AST  20  /  ALT  39  /  AlkPhos  197<H>      Ca    9.4      18 Aug 2021 00:31  Phos  2.8     08-18  Mg     1.6     08-18    TPro  7.0  /  Alb  3.0<L>  /  TBili  0.5  /  DBili  x   /  AST  29  /  ALT  46<H>  /  AlkPhos  164<H>  -    Ca    10.1      17 Aug 2021 00:29  Phos  2.3     08-17  Mg     1.8     08-17    TPro  7.8  /  Alb  3.2<L>  /  TBili  0.6  /  DBili  x   /  AST  23  /  ALT  45  /  AlkPhos  140<H>  -    Ca    9.7      16 Aug 2021 01:09  Phos  1.7     08-16  Mg     1.7     08-    TPro  8.3  /  Alb  3.4  /  TBili  0.7  /  DBili  x   /  AST  19  /  ALT  55<H>  /  AlkPhos  167<H>                                                                                         PTT - ( 2021 04:52 )  PTT:45.2 sec LIVER FUNCTIONS - ( 2021 00:42 )  Alb: 3.4 g/dL / Pro: 6.7 g/dL / ALK PHOS: 213 U/L / ALT: 15 U/L / AST: 24 U/L / GGT: x           RADIOLOGY: - Reviewed and analyzed RT Pig tail cathter  , LVAD HM2, CT scan of abdomen reviewed result noted

## 2021-08-20 NOTE — PROGRESS NOTE ADULT - SUBJECTIVE AND OBJECTIVE BOX
[facility] NUTRITION SUPPORT / TPN -- FOLLOW UP NOTE  --------------------------------------------------------------------------------    24 hour events/subjective:  Tube feeds held (pt pulling at lines).  No Acute overnight events.  Per CTU team, plan is to pull central line and eventually restart tube feeds, no plan for TPN.    Diet:  Diet, NPO with Tube Feed:   Tube Feeding Modality: Nasogastric  TwoCal HN (TWOCALHNRTH)  Total Volume for 24 Hours (mL): 1080  Continuous  Starting Tube Feed Rate mL per Hour: 10  Increase Tube Feed Rate by (mL): 10     Every 6 hours  Until Goal Tube Feed Rate (mL per Hour): 45  Tube Feed Duration (in Hours): 24  Tube Feed Start Time: 13:25 (08-18-21 @ 13:37)      PAST HISTORY  --------------------------------------------------------------------------------  No significant changes to PMH, PSH, FHx, SHx, unless otherwise noted    ALLERGIES & MEDICATIONS  --------------------------------------------------------------------------------  Allergies    No Known Allergies    Intolerances      Standing Inpatient Medications  cefepime   IVPB      cefepime   IVPB 1000 milliGRAM(s) IV Intermittent every 8 hours  chlorhexidine 0.12% Liquid 15 milliLiter(s) Oral Mucosa every 12 hours  chlorhexidine 2% Cloths 1 Application(s) Topical <User Schedule>  dextrose 5% + sodium chloride 0.45%. 1000 milliLiter(s) IV Continuous <Continuous>  heparin   Injectable 5000 Unit(s) SubCutaneous every 8 hours  insulin lispro (ADMELOG) corrective regimen sliding scale   SubCutaneous every 6 hours  methimazole 10 milliGRAM(s) Oral every 8 hours  metoclopramide Injectable 10 milliGRAM(s) IV Push every 8 hours  mirtazapine 7.5 milliGRAM(s) Oral daily  multivitamin 1 Tablet(s) Oral daily  pantoprazole  Injectable 40 milliGRAM(s) IV Push every 12 hours  propranolol 20 milliGRAM(s) Oral every 8 hours  thiamine 100 milliGRAM(s) Oral daily  vancomycin    Solution 125 milliGRAM(s) Oral every 6 hours    PRN Inpatient Medications  acetaminophen    Suspension .. 650 milliGRAM(s) Oral every 6 hours PRN  ondansetron Injectable 4 milliGRAM(s) IV Push every 6 hours PRN      VITALS/PHYSICAL EXAM  --------------------------------------------------------------------------------  T(C): 36.6 (08-20-21 @ 08:00), Max: 37.4 (08-19-21 @ 23:00)  HR: 109 (08-20-21 @ 09:19) (91 - 114)  BP: --  RR: 48 (08-20-21 @ 09:00) (15 - 48)  SpO2: 96% (08-20-21 @ 09:19) (90% - 100%)  Wt(kg): --        08-19-21 @ 07:01  -  08-20-21 @ 07:00  --------------------------------------------------------  IN: 1150 mL / OUT: 1925 mL / NET: -775 mL    08-20-21 @ 07:01  -  08-20-21 @ 10:07  --------------------------------------------------------  IN: 100 mL / OUT: 300 mL / NET: -200 mL          Physical Exam:  Gen: lying in bed, frail  HEENT: +trach +ngt   Chest: respirations non labored  Abd: soft nt nd perc c-tube in place   LE: no edema  Neuro: awake alert responsive        LABS/STUDIES  --------------------------------------------------------------------------------              9.1    15.77 >-----------<  245      [08-20-21 @ 00:37]              29.0     129  |  91  |  9   ----------------------------<  88      [08-20-21 @ 00:37]  3.8   |  26  |  0.54        Ca     10.2     [08-20-21 @ 00:37]      Mg     1.6     [08-20-21 @ 00:37]      Phos  3.5     [08-20-21 @ 00:37]    TPro  8.2  /  Alb  3.4  /  TBili  0.6  /  DBili  x   /  AST  22  /  ALT  37  /  AlkPhos  190  [08-20-21 @ 00:37]              [08-20-21 @ 00:37]    Ca ionizedBlood Gas Arterial - Calcium, Ionized: 1.27 mmol/L (08-20-21 @ 08:51)  Blood Gas Arterial - Calcium, Ionized: 1.29 mmol/L (08-20-21 @ 00:13)    Creatinine Trend:  POC glucoseGlucose, Serum: 88 mg/dL (08-20-21 @ 00:37)  CAPILLARY BLOOD GLUCOSE      POCT Blood Glucose.: 108 mg/dL (20 Aug 2021 06:41)  POCT Blood Glucose.: 232 mg/dL (20 Aug 2021 06:37)  POCT Blood Glucose.: 87 mg/dL (20 Aug 2021 00:01)  POCT Blood Glucose.: 98 mg/dL (19 Aug 2021 18:02)  POCT Blood Glucose.: 127 mg/dL (19 Aug 2021 12:40)    PrealbuminPrealbumin, Serum: 11 mg/dL (08-16-21 @ 04:01)  Prealbumin, Serum: 10 mg/dL (08-15-21 @ 13:53)    Triglycerides

## 2021-08-20 NOTE — PROGRESS NOTE ADULT - ASSESSMENT
Assessment  Hyperthyroidism: 66y Male with no history thyroid disease, was not on any thyroid supplements, previously euthyroid (June 2021), was on amiodarone (last dose 8/7), now in hyperthyroid state, Hashimotos, started on Methimazole 10mg TID and propanolol for tachycardia, received prednisone doses (now DC'd). Methimazole restarted once LFTs improved, LFTs fluctuating, remains hyperthyroid, Palliative Care on board for goals of care.  Hyperglycemia: Patient in nondiabetic range, A1C 5.6%, now on insulin coverage, blood sugars in acceptable range, no hypoglycemias, on TFs.  CHF: on medications, stable, monitored.  Anemia: Monitored      Pavan Barone MD  Cell: [phone]  Office: [phone]       Assessment  Hyperthyroidism: 66y Male with no history thyroid disease, was not on any thyroid supplements, previously euthyroid (June 2021), was on amiodarone (last dose 8/7),  now in hyperthyroid state, Hashimotos, started on Methimazole 10mg TID and propanolol for tachycardia, received prednisone doses (now DC'd). Methimazole restarted once LFTs improved, LFTs fluctuating, remains hyperthyroid, Palliative Care on board for goals of care.  Hyperglycemia: Patient in nondiabetic range, A1C 5.6%, now on insulin coverage, blood sugars in acceptable range, no hypoglycemias, on TFs.  CHF: on medications, stable, monitored.  Anemia: Monitored      Pavan Barone MD  Cell: [phone]  Office: [phone]

## 2021-08-20 NOTE — PROGRESS NOTE ADULT - ATTENDING COMMENTS
Agree with above. Abdominal pain unclear if related to tube feeds. PPI tx. Continue with TFs as patient allows. Supportive care.

## 2021-08-20 NOTE — CHART NOTE - NSCHARTNOTEFT_GEN_A_CORE
Case discussed with CLAUDIA Sneed, Dr. Thompson and KINSEY Hilario  Palliative was reconsulted to assist with goals of care  Psychiatry note completed but not addressing patients capacity, which is important.  I have attempted outreach to family- sister Alyssa phone goes straight to voicemail and voicemail box full.  Attempted outreach to nimolina Oscar- no answer, voicemail left void of PHI with contact number    Patient unwilling to speak with this provider at time of eval.  Saying "I don't care". RN working with patient to initiate tube feeds.      If patient consistently refuses and does NOT have capacity, then involvement of the HCP is needed for their input, however, if they do not support withholding nutrition, then this represents treatment over objection, an Ethical issue.    If patient consistently refuses and DOES have capacity, including his understanding that without nutrition he will die, then this is the patients right.    After discussion with Dr. Thompson, palliative is signing off.  Care per CTU team.  [phone]

## 2021-08-20 NOTE — CHART NOTE - NSCHARTNOTEFT_GEN_A_CORE
Pt seen at bedside for repeat evaluation and treatment of speech and swallow. Pt participated in PMV trial for 20 minutes. Pt seen at bedside, awake and alert, minimally verbally responsive, following some directions for the purposes of the exam. Pt with whitish yellow secretions at trach site, expectorating clear secretions from oral cavity during evaluation, noted with wet vocal quality during speaking valve trial, briefly able to clear with cued cough, but became wet/gurgly again soon after, thus with continued evidence of poor secretion management at this time.     Pt was administered single trial of small crushed ice chip, noted with evidence of oropharyngeal dysphagia notable for delayed swallow trigger, reduced hyolaryngeal elevation on palpation, and s/s laryngeal penetration/aspiration including increased wet/gurgly vocal quality and weak delayed cough.     Pt seen for passy-darren speaking valve evaluation. Pt maintained on 40% FIO2 via trach collar. Low pressure valve placed on hub of trach. Vocal quality significantly hypophonic, hoarse/breathy. Pt with increased RR (30s) that increased throughout assessment to 40s. No other change in vitals, and no signs of respiratory distress, but Pt appeared to fatigue, and reported fatigue as evaluation progressed. No signs of air trapping observed. Valve removed after 20 minutes.    Purposeful proactive rounding reinforced and 5 Ps addressed. Pt left in no distress. Findings d/w CLAUDIA Erickson and DILIP Mann.    RECOMMENDATIONS:  NPO, with non-oral nutrition/hydration/medications.   Maintain good oral hygiene.   Strict aspiration and reflux precautions!  Defer PMV use at this time. PMV trials with SLP only at this time.  This service will continue to follow.     Gege Hernandez MA, CCC-SLP  Speech-Language Pathologist  pager #[phone]

## 2021-08-20 NOTE — PROGRESS NOTE ADULT - SUBJECTIVE AND OBJECTIVE BOX
Chief complaint  Patient is a 66y old  Male who presents with a chief complaint of Anemia, Supratherapeutic INR, Dark Stools (20 Aug 2021 10:06)   Review of systems  Patient in bed, looks comfortable, no hypoglycemic episodes.    Labs and Fingersticks  CAPILLARY BLOOD GLUCOSE      POCT Blood Glucose.: 108 mg/dL (20 Aug 2021 06:41)  POCT Blood Glucose.: 232 mg/dL (20 Aug 2021 06:37)  POCT Blood Glucose.: 87 mg/dL (20 Aug 2021 00:01)  POCT Blood Glucose.: 98 mg/dL (19 Aug 2021 18:02)      Anion Gap, Serum: 12 (08-20 @ 00:37)  Anion Gap, Serum: 10 (08-19 @ 01:02)      Calcium, Total Serum: 10.2 (08-20 @ 00:37)  Calcium, Total Serum: 9.6 (08-19 @ 01:02)  Albumin, Serum: 3.4 (08-20 @ 00:37)  Albumin, Serum: 3.1 *L* (08-19 @ 01:02)    Alanine Aminotransferase (ALT/SGPT): 37 (08-20 @ 00:37)  Alanine Aminotransferase (ALT/SGPT): 39 (08-19 @ 01:02)  Alkaline Phosphatase, Serum: 190 *H* (08-20 @ 00:37)  Alkaline Phosphatase, Serum: 197 *H* (08-19 @ 01:02)  Aspartate Aminotransferase (AST/SGOT): 22 (08-20 @ 00:37)  Aspartate Aminotransferase (AST/SGOT): 20 (08-19 @ 01:02)        08-20    129<L>  |  91<L>  |  9   ----------------------------<  88  3.8   |  26  |  0.54    Ca    10.2      20 Aug 2021 00:37  Phos  3.5     08-20  Mg     1.6     08-20    TPro  8.2  /  Alb  3.4  /  TBili  0.6  /  DBili  x   /  AST  22  /  ALT  37  /  AlkPhos  190<H>  08-20                        9.1    15.77 )-----------( 245      ( 20 Aug 2021 00:37 )             29.0     Medications  MEDICATIONS  (STANDING):  cefepime   IVPB      cefepime   IVPB 1000 milliGRAM(s) IV Intermittent every 8 hours  chlorhexidine 0.12% Liquid 15 milliLiter(s) Oral Mucosa every 12 hours  chlorhexidine 2% Cloths 1 Application(s) Topical <User Schedule>  dextrose 5% + sodium chloride 0.45%. 1000 milliLiter(s) (50 mL/Hr) IV Continuous <Continuous>  heparin   Injectable 5000 Unit(s) SubCutaneous every 8 hours  insulin lispro (ADMELOG) corrective regimen sliding scale   SubCutaneous every 6 hours  methimazole 10 milliGRAM(s) Oral every 8 hours  metoclopramide Injectable 10 milliGRAM(s) IV Push every 8 hours  mirtazapine 7.5 milliGRAM(s) Oral daily  multivitamin 1 Tablet(s) Oral daily  pantoprazole  Injectable 40 milliGRAM(s) IV Push every 12 hours  propranolol 20 milliGRAM(s) Oral every 8 hours  thiamine 100 milliGRAM(s) Oral daily  vancomycin    Solution 125 milliGRAM(s) Oral every 6 hours      Physical Exam  General: Patient comfortable in bed  Vital Signs Last 12 Hrs  T(F): 97.9 (08-20-21 @ 08:00), Max: 97.9 (08-20-21 @ 08:00)  HR: 126 (08-20-21 @ 14:00) (91 - 126)  BP: --  BP(mean): --  RR: 30 (08-20-21 @ 14:00) (17 - 48)  SpO2: 95% (08-20-21 @ 14:00) (90% - 100%)  Neck: No palpable thyroid nodules.  CVS: S1S2, No murmurs  Respiratory: No wheezing, no crepitations  GI: Abdomen soft, bowel sounds positive  Musculoskeletal:  edema lower extremities.   Skin: No skin rashes, no ecchymosis    Diagnostics    US Thyroid: Routine   Indication: hyperthyroidism  Transport: Stretcher-Crib,  w/ Monitor  Provider's Contact #: [phone] (08-08 @ 16:13)  TSH Receptor Antibody: AM Sched. Collection: 09-Aug-2021 06:00 (08-08 @ 13:31)  Thyroperoxidase Antibody: AM Sched. Collection: 09-Aug-2021 06:00 (08-08 @ 13:31)           Chief complaint  Patient is a 66y old  Male who presents with a chief complaint of Anemia, Supratherapeutic INR, Dark Stools (20 Aug 2021 10:06)   Review of systems  Patient in bed, looks comfortable, no hypoglycemic episodes.    Labs and Fingersticks  CAPILLARY BLOOD GLUCOSE      POCT Blood Glucose.: 108 mg/dL (20 Aug 2021 06:41)  POCT Blood Glucose.: 232 mg/dL (20 Aug 2021 06:37)  POCT Blood Glucose.: 87 mg/dL (20 Aug 2021 00:01)  POCT Blood Glucose.: 98 mg/dL (19 Aug 2021 18:02)      Anion Gap, Serum: 12 (08-20 @ 00:37)  Anion Gap, Serum: 10 (08-19 @ 01:02)      Calcium, Total Serum: 10.2 (08-20 @ 00:37)  Calcium, Total Serum: 9.6 (08-19 @ 01:02)  Albumin, Serum: 3.4 (08-20 @ 00:37)  Albumin, Serum: 3.1 *L* (08-19 @ 01:02)    Alanine Aminotransferase (ALT/SGPT): 37 (08-20 @ 00:37)  Alanine Aminotransferase (ALT/SGPT): 39 (08-19 @ 01:02)  Alkaline Phosphatase, Serum: 190 *H* (08-20 @ 00:37)  Alkaline Phosphatase, Serum: 197 *H* (08-19 @ 01:02)  Aspartate Aminotransferase (AST/SGOT): 22 (08-20 @ 00:37)  Aspartate Aminotransferase (AST/SGOT): 20 (08-19 @ 01:02)        08-20    129<L>  |  91<L>  |  9   ----------------------------<  88  3.8   |  26  |  0.54    Ca    10.2      20 Aug 2021 00:37  Phos  3.5     08-20  Mg     1.6     08-20    TPro  8.2  /  Alb  3.4  /  TBili  0.6  /  DBili  x   /  AST  22  /  ALT  37  /  AlkPhos  190<H>  08-20                        9.1    15.77 )-----------( 245      ( 20 Aug 2021 00:37 )             29.0     Medications  MEDICATIONS  (STANDING):  cefepime   IVPB      cefepime   IVPB 1000 milliGRAM(s) IV Intermittent every 8 hours  chlorhexidine 0.12% Liquid 15 milliLiter(s) Oral Mucosa every 12 hours  chlorhexidine 2% Cloths 1 Application(s) Topical <User Schedule>  dextrose 5% + sodium chloride 0.45%. 1000 milliLiter(s) (50 mL/Hr) IV Continuous <Continuous>  heparin   Injectable 5000 Unit(s) SubCutaneous every 8 hours  insulin lispro (ADMELOG) corrective regimen sliding scale   SubCutaneous every 6 hours  methimazole 10 milliGRAM(s) Oral every 8 hours  metoclopramide Injectable 10 milliGRAM(s) IV Push every 8 hours  mirtazapine 7.5 milliGRAM(s) Oral daily  multivitamin 1 Tablet(s) Oral daily  pantoprazole  Injectable 40 milliGRAM(s) IV Push every 12 hours  propranolol 20 milliGRAM(s) Oral every 8 hours  thiamine 100 milliGRAM(s) Oral daily  vancomycin    Solution 125 milliGRAM(s) Oral every 6 hours      Physical Exam  General: Patient comfortable in bed  Vital Signs Last 12 Hrs  T(F): 97.9 (08-20-21 @ 08:00), Max: 97.9 (08-20-21 @ 08:00)  HR: 126 (08-20-21 @ 14:00) (91 - 126)  BP: --  BP(mean): --  RR: 30 (08-20-21 @ 14:00) (17 - 48)  SpO2: 95% (08-20-21 @ 14:00) (90% - 100%)  Neck: No palpable thyroid nodules.  CVS: S1S2, No murmurs  Respiratory: No wheezing, no crepitations  GI: Abdomen soft, bowel sounds positive  Musculoskeletal:  edema lower extremities.   Skin: No skin rashes, no ecchymosis    Diagnostics    US Thyroid: Routine   Indication: hyperthyroidism  Transport: Stretcher-Crib,  w/ Monitor  Provider's Contact #: [phone] (08-08 @ 16:13)  TSH Receptor Antibody: AM Sched. Collection: 09-Aug-2021 06:00 (08-08 @ 13:31)  Thyroperoxidase Antibody: AM Sched. Collection: 09-Aug-2021 06:00 (08-08 @ 13:31)

## 2021-08-20 NOTE — PROGRESS NOTE ADULT - SUBJECTIVE AND OBJECTIVE BOX
RICKY HAMPTON  MRN-[id number]  Patient is a 66y old  Male who presents with a chief complaint of Anemia, Supratherapeutic INR, Dark Stools (19 Aug 2021 20:47)    HPI:  65M PMH ACC/AHA stage D HF due to NICM HM2 LVAD , TV annuloplasty ring 17 as destination therapy due to severe peripheral artery disease with significant stenosis  SIADH, Depression, CKD-3 with hyperkalemia, past E. coli UTIs, driveline drainage (21) and COVID-19 (back in 2020)  He was recently seen in clinic where he complained of abdominal pain and dark stools w constipation back in May. He presents to [facility] ER today weakness and fatigue, moderate and + Black stools for three days, on coumadin secondary to warfarin use in the setting of an LVAD. Patient has required transfusions for GIB in the past. Mostly recently back in 2021 pt had anemia with dark stools. No interventions was done at that time. However Last Endoscopy was done in 2020 (negative). Today labs show patient is anemic with H/H of 4.5/16.3,. INR is 8.84 MAP in the 90s, Temp 35.1. He denies any chest pain, shortness of breath, dizziness, abd pain, nausea or vomiting.       (2021 16:57)      Surgery/Hospital Course:   admit for melena w/ anemia, INR 8.84   6/15 Capsul study (+) for small bowel bleed, balloon endoscopy (old blood in prox ileum); post EGD - septic w/ L opacity, re-intubated for concern for aspiration, TTE (Mod MR, decrease biV w/ interventricular septum boweing towards R)   bronch    +C Diff    TC    CT C/A/P: Fluid filled colon which may be 2/2 rapid transit. Small bilateral pleffs with associates. Compressive atelectasis New ISABELLE & LLL  parenchymal opacities, suspicious for pneumonia. 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    #8 Shiley trach at bedside    CPAP trials    LVAD speed increased to 9200   Bronch; Central line dc'ed   TC since , continue as tolerated. Patient transferred to SDU.    Cont PT, no trach downsize today(#8cuffed)/ Anticoagulation/ Continue TF, speech f/u/ Vanco taper    oob to chair  INR  2.47  5 mg coumadin     increased lop to 25 q8  per HF   INR today 2.64.  H&H 7.3 this AM.  Will repeat CBC at noon, and will send stool guaiac Patient with persistent abdominal tenderness, rate of tube feeds decreased.  No nausea/vomiting.     INR today 2.4H&H 9.1.6 low flow overnight /N&V  NPO resolved for capsule study  Coumadin on hold refusing Tube feeds on D5 1/2 normal  @50 cc/hr   INR 2.69  H&H 7..1 refusing Tube feeds on D5 1/2 normal  @50 cc/hr. This am + BM Anusha Oneill HF  aware- PRBC x1  GI team consulted -  NPO  plan on study in am-  D/w Dr Cadet Patient  to return to CTU for further management; 1PRBCS    Post op INR 2.2 today.  No bleeding. BC + for SM.  Pt is hypotensive requiring pressor and inotropic support.  ID follow up today on Cefepime will follow.   R PTC for PTX    CT C/A/P: sub q emphysema in R chest wall, GGO RUL, small ascites CTH negative; Abd US: GB thickening, pericholecystic fluid     Perchole drain in place continues to drain total output overnight 133.  Fever today 38.8 given tylenol.  Will repeat BC now.     duplex LE negative    Patient with persistent abdominal pain, refusing tube feeds and medications, Psych consulted   CTA A/P ordered to r/o mesenteric ischemia 2/2 persistent anorexia, nausea, vomiting. Revealed:  Evaluation of the mesenteric vessels is limited by streak artifact from LVAD. There appears to be severe stenosis of the proximal SMA; abdominal mesenteric doppler is recommended for further evaluation. 2.  No small bowel findings to suggest acute mesenteric ischemia. 3.  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  8/15: Cultures resulted BC 1/2 +GPC in clusters, SC enterobacter; mesenteric duplex: borderline stenosis of proximal SMA     Today:      REVIEW OF SYSTEMS:  Talks over trach   Gen: No fever  EYES/ENT: No visual changes;  No vertigo or throat pain   NECK: No pain   RES:  No shortness of breath or Cough  CARD: No chest pain   GI: No abdominal pain  : No dysuria  NEURO: No weakness  SKIN: No itching, rashes     ICU Vital Signs Last 24 Hrs  T(C): 37 (20 Aug 2021 03:00), Max: 37.4 (19 Aug 2021 23:00)  T(F): 98.6 (20 Aug 2021 03:00), Max: 99.3 (19 Aug 2021 23:00)  HR: 99 (20 Aug 2021 07:00) (91 - 114)  BP: --  BP(mean): 84 (19 Aug 2021 23:20) (84 - 84)  ABP: 101/71 (20 Aug 2021 07:00) (90/69 - 105/76)  ABP(mean): 83 (20 Aug 2021 07:00) (75 - 87)  RR: 17 (20 Aug 2021 07:) (15 - 39)  SpO2: 94% (20 Aug 2021 07:) (93% - 100%)      Physical Exam:  Gen:  awake and alert, NAD, Malnourished  CNS:  intact, nonfocal, responds to all commands  Neck: no JVD, +TC   RES : course breath sounds, no wheezing              CVS: +LVAD hum   Abd: Soft, minimally-moderately tender in RUQ by perc dawn site, NT everywhere else, positive BS throughout. Perc dawn drain site c/d/i draining bilious fluid.  Skin: No rash  Ext:  no edema    ============================I/O===========================   I&O's Detail    19 Aug 2021 07:01  -  20 Aug 2021 07:00  --------------------------------------------------------  IN:    dextrose 5% + sodium chloride 0.45%: 400 mL    Enteral Tube Flush: 320 mL    IV PiggyBack: 250 mL    Miscellaneous Tube Feedin mL  Total IN: 1150 mL    OUT:    Drain (mL): 350 mL    Voided (mL): 1575 mL  Total OUT: 1925 mL    Total NET: -775 mL        ============================ LABS =========================                        9.1    15.77 )-----------( 245      ( 20 Aug 2021 00:37 )             29.0     08-20    129<L>  |  91<L>  |  9   ----------------------------<  88  3.8   |  26  |  0.54    Ca    10.2      20 Aug 2021 00:37  Phos  3.5     08-20  Mg     1.6     08-20    TPro  8.2  /  Alb  3.4  /  TBili  0.6  /  DBili  x   /  AST  22  /  ALT  37  /  AlkPhos  190<H>  08-20    LIVER FUNCTIONS - ( 20 Aug 2021 00:37 )  Alb: 3.4 g/dL / Pro: 8.2 g/dL / ALK PHOS: 190 U/L / ALT: 37 U/L / AST: 22 U/L / GGT: x             ABG - ( 20 Aug 2021 00:13 )  pH, Arterial: 7.40  pH, Blood: x     /  pCO2: 51    /  pO2: 140   / HCO3: 32    / Base Excess: 5.2   /  SaO2: 99.6                ======================Micro/Rad/Cardio=================  Culture: Reviewed   CXR: Reviewed  Echo:Reviewed  ======================================================  PAST MEDICAL & SURGICAL HISTORY:  CHF (congestive heart failure)    CAD (coronary artery disease)    Depression    Pleural effusion    History of 2019 novel coronavirus disease (COVID-19)  2020    Hemorrhoids    Bleeding hemorrhoids    Peripheral arterial disease    Claudication    BPH with urinary obstruction    ACC/AHA stage D systolic heart failure    Anticoagulation goal of INR 2.0 to 2.5    Falls    Clavicle fracture    CKD (chronic kidney disease), stage III    Iron deficiency anemia    H/O epistaxis    Vertigo    GI bleed    S/P TVR (tricuspid valve repair)    S/P ventricular assist device    S/P endoscopy      ====================ASSESSMENT ==============  Stage D Nonischemic Cardiomyopathy, Status Post HM2 on 2017    Cardiogenic shock  Hemodynamic instability   Acute hypoxemic respiratory failure  GI bleed , Status Post Enteroscopy   Anemia, in setting of melena   Chronic Kidney Disease  Stress hyperglycemia   C.diff positive on    Hypovolemic shock  Septic shock  Leukocytosis    Plan:  ====================== NEUROLOGY=====================  Nonfocal, continue to monitor neuro status   Tylenol PRN for analgesia   C/w mirtazapine for depression  Ambulate as tolerated     acetaminophen    Suspension .. 650 milliGRAM(s) Oral every 6 hours PRN Mild Pain (1 - 3)  mirtazapine 7.5 milliGRAM(s) Oral daily    ==================== RESPIRATORY======================  Acute hypoxemic respiratory failure s/p #8 shiley trach on ; patient put back on assist control on  2/2 worsening of clinical condition, continue TC as tolerated (TC since )    Continue close monitoring of respiratory rate and breathing pattern, following of ABG’s with A-line monitoring, continuous pulse oximetry monitoring.     Mechanical Ventilation:  Mode: off  FiO2: 40      ====================CARDIOVASCULAR==================  Stage D Nonischemic Cardiomyopathy, Status Post HM2 on 2017; LVAD settings 9200, flow 5.8   TTE : reveals at least moderate MR, mild AI, severe global LV syst dysfxn, dec RV fxn   Continue invasive hemodynamic monitoring   Propranolol for rate control of HR 2/2 Hyperthyroidism, titrate as tolerated    propranolol 20 milliGRAM(s) Oral every 8 hours    ===================HEMATOLOGIC/ONC ===================  Acute blood loss anemia, monitor H&H/Plts    Continue monitor LDH daily     VTE prophylaxis, heparin   Injectable 5000 Unit(s) SubCutaneous every 8 hours    ===================== RENAL =========================  Continue to monitor I/Os, BUN/Creatinine, and urine output.   Goal net negative fluid balance. Replete lytes PRN. Keep K> 4 and Mg >2.     ==================== GASTROINTESTINAL===================  S/p cholecystostomy tube placed on : with IR with -60cc of black bile, will monitor site closely.   Recent emesis on  in setting of abdominal pain, as per GI likely component of illeus given critical illness   CTA A/P : Evaluation of the mesenteric vessels is limited by streak artifact from LVAD. There appears to be severe stenosis of the proximal SMA; abdominal mesenteric doppler is recommended for further evaluation. 2.  No small bowel findings to suggest acute mesenteric ischemia. 3.  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  Vascular surgery and IR consulted for potential SMA stent. Both teams state that SMA stent is not warranted as they don't believe SMA stenosis is as severe on CTA as read by radiology and do not believe that this stenosis is causing patient's intolerance to TFs  Mesenteric duplex on 8/15 borderline stenosis of proximal SMA   TwoCal HN feeds - patient refusing feeds, will consult palliative and ethics   Zofran PRN for nausea   Reglan for gut motility     dextrose 5% + sodium chloride 0.45%. 1000 milliLiter(s) (50 mL/Hr) IV Continuous <Continuous>  multivitamin 1 Tablet(s) Oral daily  GI prophylaxis, pantoprazole  Injectable 40 milliGRAM(s) IV Push every 12 hours  thiamine 100 milliGRAM(s) Oral daily  ondansetron Injectable 4 milliGRAM(s) IV Push every 6 hours PRN Nausea and/or Vomiting  metoclopramide Injectable 10 milliGRAM(s) IV Push every 8 hours    =======================    ENDOCRINE  =====================  Stress hyperglycemia, continue glucose control with admelog sliding scale   Thyroid US to evaluate for thyroid nodule/goiter in setting of hyperthyroid  - contraindicated at this time 2/2 trach/will do when trach is out   C/w methimazole in setting of low TSH    insulin lispro (ADMELOG) corrective regimen sliding scale   SubCutaneous every 6 hours  methimazole 10 milliGRAM(s) Oral every 8 hours    ========================INFECTIOUS DISEASE================  Afebrile, WBC rising from 12.13 -> 15.41 -> 15.77  Monitor temperature and trend WBC.   BC 1/2 +GPC in clusters, SC enterobacter(CRE). C/w cefepime and vancomycin       cefepime   IVPB 1000 milliGRAM(s) IV Intermittent every 8 hours  vancomycin    Solution 125 milliGRAM(s) Oral every 6 hours      Patient requires continuous monitoring with bedside rhythm monitoring, pulse ox monitoring, and intermittent blood gas analysis. Care plan discussed with ICU care team. Patient remained critical and at risk for life threatening decompensation.     By signing my name below, IAgnieszka, attest that this documentation has been prepared under the direction and in the presence of CLAUDIA Lee   Electronically signed: Cesia Shaffer, 21 @ 08:35    I, Georgina Perez, personally performed the services described in this documentation. all medical record entries made by the scribe were at my direction and in my presence. I have reviewed the chart and agree that the record reflects my personal performance and is accurate and complete  Electronically signed: CLAUDIA Lee        RICKY HAMPTON  MRN-[id number]  Patient is a 66y old  Male who presents with a chief complaint of Anemia, Supratherapeutic INR, Dark Stools (19 Aug 2021 20:47)    HPI:  65M PMH ACC/AHA stage D HF due to NICM HM2 LVAD , TV annuloplasty ring 17 as destination therapy due to severe peripheral artery disease with significant stenosis  SIADH, Depression, CKD-3 with hyperkalemia, past E. coli UTIs, driveline drainage (21) and COVID-19 (back in 2020)  He was recently seen in clinic where he complained of abdominal pain and dark stools w constipation back in May. He presents to [facility] ER today weakness and fatigue, moderate and + Black stools for three days, on coumadin secondary to warfarin use in the setting of an LVAD. Patient has required transfusions for GIB in the past. Mostly recently back in 2021 pt had anemia with dark stools. No interventions was done at that time. However Last Endoscopy was done in 2020 (negative). Today labs show patient is anemic with H/H of 4.5/16.3,. INR is 8.84 MAP in the 90s, Temp 35.1. He denies any chest pain, shortness of breath, dizziness, abd pain, nausea or vomiting.       (2021 16:57)      Surgery/Hospital Course:   admit for melena w/ anemia, INR 8.84   6/15 Capsul study (+) for small bowel bleed, balloon endoscopy (old blood in prox ileum); post EGD - septic w/ L opacity, re-intubated for concern for aspiration, TTE (Mod MR, decrease biV w/ interventricular septum boweing towards R)   bronch    +C Diff    TC    CT C/A/P: Fluid filled colon which may be 2/2 rapid transit. Small bilateral pleffs with associates. Compressive atelectasis New ISABELLE & LLL  parenchymal opacities, suspicious for pneumonia. 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    #8 Shiley trach at bedside    CPAP trials    LVAD speed increased to 9200   Bronch; Central line dc'ed   TC since , continue as tolerated. Patient transferred to SDU.    Cont PT, no trach downsize today(#8cuffed)/ Anticoagulation/ Continue TF, speech f/u/ Vanco taper    oob to chair  INR  2.47  5 mg coumadin     increased lop to 25 q8  per HF   INR today 2.64.  H&H 7.3 this AM.  Will repeat CBC at noon, and will send stool guaiac Patient with persistent abdominal tenderness, rate of tube feeds decreased.  No nausea/vomiting.     INR today 2.4H&H 9.1.6 low flow overnight /N&V  NPO resolved for capsule study  Coumadin on hold refusing Tube feeds on D5 1/2 normal  @50 cc/hr   INR 2.69  H&H 7..1 refusing Tube feeds on D5 1/2 normal  @50 cc/hr. This am + BM Anusha Oneill HF  aware- PRBC x1  GI team consulted -  NPO  plan on study in am-  D/w Dr Cadet Patient  to return to CTU for further management; 1PRBCS    Post op INR 2.2 today.  No bleeding. BC + for SM.  Pt is hypotensive requiring pressor and inotropic support.  ID follow up today on Cefepime will follow.   R PTC for PTX    CT C/A/P: sub q emphysema in R chest wall, GGO RUL, small ascites CTH negative; Abd US: GB thickening, pericholecystic fluid     Perchole drain in place continues to drain total output overnight 133.  Fever today 38.8 given tylenol.  Will repeat BC now.     duplex LE negative    Patient with persistent abdominal pain, refusing tube feeds and medications, Psych consulted   CTA A/P ordered to r/o mesenteric ischemia 2/2 persistent anorexia, nausea, vomiting. Revealed:  Evaluation of the mesenteric vessels is limited by streak artifact from LVAD. There appears to be severe stenosis of the proximal SMA; abdominal mesenteric doppler is recommended for further evaluation. 2.  No small bowel findings to suggest acute mesenteric ischemia. 3.  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  8/15: Cultures resulted BC 1/2 +GPC in clusters, SC enterobacter; mesenteric duplex: borderline stenosis of proximal SMA     Today:  Tube feeds held overnight, plan to restart. TC since , continue as tolerated. Continue ambulation as tolerated.     REVIEW OF SYSTEMS:  Talks over trach   Gen: No fever  EYES/ENT: No visual changes;  No vertigo or throat pain   NECK: No pain   RES:  No shortness of breath or Cough  CARD: No chest pain   GI: No abdominal pain  : No dysuria  NEURO: No weakness  SKIN: No itching, rashes     ICU Vital Signs Last 24 Hrs  T(C): 37 (20 Aug 2021 03:00), Max: 37.4 (19 Aug 2021 23:00)  T(F): 98.6 (20 Aug 2021 03:00), Max: 99.3 (19 Aug 2021 23:00)  HR: 99 (20 Aug 2021 07:00) (91 - 114)  BP: --  BP(mean): 84 (19 Aug 2021 23:20) (84 - 84)  ABP: 101/71 (20 Aug 2021 07:00) (90/69 - 105/76)  ABP(mean): 83 (20 Aug 2021 07:00) (75 - 87)  RR: 17 (20 Aug 2021 07:00) (15 - 39)  SpO2: 94% (20 Aug 2021 07:00) (93% - 100%)      Physical Exam:  Gen:  awake and alert, NAD, Malnourished  CNS:  intact, nonfocal, responds to all commands  Neck: no JVD, +TC   RES : course breath sounds, no wheezing              CVS: +LVAD hum   Abd: Soft, minimally-moderately tender in RUQ by perc dawn site, NT everywhere else, positive BS throughout. Perc dawn drain site c/d/i draining bilious fluid.  Skin: No rash  Ext:  no edema    ============================I/O===========================   I&O's Detail    19 Aug 2021 07:01  -  20 Aug 2021 07:00  --------------------------------------------------------  IN:    dextrose 5% + sodium chloride 0.45%: 400 mL    Enteral Tube Flush: 320 mL    IV PiggyBack: 250 mL    Miscellaneous Tube Feedin mL  Total IN: 1150 mL    OUT:    Drain (mL): 350 mL    Voided (mL): 1575 mL  Total OUT: 1925 mL    Total NET: -775 mL        ============================ LABS =========================                        9.1    15.77 )-----------( 245      ( 20 Aug 2021 00:37 )             29.0     08-20    129<L>  |  91<L>  |  9   ----------------------------<  88  3.8   |  26  |  0.54    Ca    10.2      20 Aug 2021 00:37  Phos  3.5     08-20  Mg     1.6     08-20    TPro  8.2  /  Alb  3.4  /  TBili  0.6  /  DBili  x   /  AST  22  /  ALT  37  /  AlkPhos  190<H>  08-    LIVER FUNCTIONS - ( 20 Aug 2021 00:37 )  Alb: 3.4 g/dL / Pro: 8.2 g/dL / ALK PHOS: 190 U/L / ALT: 37 U/L / AST: 22 U/L / GGT: x             ABG - ( 20 Aug 2021 00:13 )  pH, Arterial: 7.40  pH, Blood: x     /  pCO2: 51    /  pO2: 140   / HCO3: 32    / Base Excess: 5.2   /  SaO2: 99.6                ======================Micro/Rad/Cardio=================  Culture: Reviewed   CXR: Reviewed  Echo:Reviewed  ======================================================  PAST MEDICAL & SURGICAL HISTORY:  CHF (congestive heart failure)    CAD (coronary artery disease)    Depression    Pleural effusion    History of 2019 novel coronavirus disease (COVID-19)  2020    Hemorrhoids    Bleeding hemorrhoids    Peripheral arterial disease    Claudication    BPH with urinary obstruction    ACC/AHA stage D systolic heart failure    Anticoagulation goal of INR 2.0 to 2.5    Falls    Clavicle fracture    CKD (chronic kidney disease), stage III    Iron deficiency anemia    H/O epistaxis    Vertigo    GI bleed    S/P TVR (tricuspid valve repair)    S/P ventricular assist device    S/P endoscopy      ====================ASSESSMENT ==============  Stage D Nonischemic Cardiomyopathy, Status Post HM2 on 2017    Cardiogenic shock  Hemodynamic instability   Acute hypoxemic respiratory failure  GI bleed , Status Post Enteroscopy   Anemia, in setting of melena   Chronic Kidney Disease  Stress hyperglycemia   C.diff positive on    Hypovolemic shock  Septic shock  Leukocytosis    Plan:  ====================== NEUROLOGY=====================  Nonfocal, continue to monitor neuro status   Tylenol PRN for analgesia   C/w mirtazapine for depression  Ambulate as tolerated     acetaminophen    Suspension .. 650 milliGRAM(s) Oral every 6 hours PRN Mild Pain (1 - 3)  mirtazapine 7.5 milliGRAM(s) Oral daily    ==================== RESPIRATORY======================  Acute hypoxemic respiratory failure s/p #8 shiley trach on ; patient put back on assist control on  2/2 worsening of clinical condition, continue TC as tolerated (TC since )    Continue close monitoring of respiratory rate and breathing pattern, following of ABG’s with A-line monitoring, continuous pulse oximetry monitoring.     Mechanical Ventilation:  Mode: off  FiO2: 40      ====================CARDIOVASCULAR==================  Stage D Nonischemic Cardiomyopathy, Status Post HM2 on 2017; LVAD settings 9200, flow 5.8   TTE : reveals at least moderate MR, mild AI, severe global LV syst dysfxn, dec RV fxn   Continue invasive hemodynamic monitoring   Propranolol for rate control of HR 2/2 Hyperthyroidism, titrate as tolerated    propranolol 20 milliGRAM(s) Oral every 8 hours    ===================HEMATOLOGIC/ONC ===================  Acute blood loss anemia, monitor H&H/Plts    Continue monitor LDH daily     VTE prophylaxis, heparin   Injectable 5000 Unit(s) SubCutaneous every 8 hours    ===================== RENAL =========================  Continue to monitor I/Os, BUN/Creatinine, and urine output.   Goal net negative fluid balance. Replete lytes PRN. Keep K> 4 and Mg >2.     ==================== GASTROINTESTINAL===================  S/p cholecystostomy tube placed on : with IR with -60cc of black bile, will monitor site closely.   Recent emesis on  in setting of abdominal pain, as per GI likely component of illeus given critical illness   CTA A/P : Evaluation of the mesenteric vessels is limited by streak artifact from LVAD. There appears to be severe stenosis of the proximal SMA; abdominal mesenteric doppler is recommended for further evaluation. 2.  No small bowel findings to suggest acute mesenteric ischemia. 3.  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  Vascular surgery and IR consulted for potential SMA stent. Both teams state that SMA stent is not warranted as they don't believe SMA stenosis is as severe on CTA as read by radiology and do not believe that this stenosis is causing patient's intolerance to TFs  Mesenteric duplex on 8/15 borderline stenosis of proximal SMA   TwoCal HN feeds, held overnight, plan to restart today   Zofran PRN for nausea   Reglan for gut motility     dextrose 5% + sodium chloride 0.45%. 1000 milliLiter(s) (50 mL/Hr) IV Continuous <Continuous>  multivitamin 1 Tablet(s) Oral daily  GI prophylaxis, pantoprazole  Injectable 40 milliGRAM(s) IV Push every 12 hours  thiamine 100 milliGRAM(s) Oral daily  ondansetron Injectable 4 milliGRAM(s) IV Push every 6 hours PRN Nausea and/or Vomiting  metoclopramide Injectable 10 milliGRAM(s) IV Push every 8 hours    =======================    ENDOCRINE  =====================  Stress hyperglycemia, continue glucose control with admelog sliding scale   Thyroid US to evaluate for thyroid nodule/goiter in setting of hyperthyroid  - contraindicated at this time 2/2 trach/will do when trach is out   C/w methimazole in setting of low TSH    insulin lispro (ADMELOG) corrective regimen sliding scale   SubCutaneous every 6 hours  methimazole 10 milliGRAM(s) Oral every 8 hours    ========================INFECTIOUS DISEASE================  Afebrile, WBC rising from 12.13 -> 15.41 -> 15.77  Monitor temperature and trend WBC.   BC 1/2 +GPC in clusters, SC enterobacter(CRE). C/w cefepime and vancomycin       cefepime   IVPB 1000 milliGRAM(s) IV Intermittent every 8 hours  vancomycin    Solution 125 milliGRAM(s) Oral every 6 hours      Patient requires continuous monitoring with bedside rhythm monitoring, pulse ox monitoring, and intermittent blood gas analysis. Care plan discussed with ICU care team. Patient remained critical and at risk for life threatening decompensation.     By signing my name below, IAgnieszka, attest that this documentation has been prepared under the direction and in the presence of CLAUDIA Lee   Electronically signed: Cesia Shaffer, 21 @ 08:35    IGeorgina, personally performed the services described in this documentation. all medical record entries made by the luisibe were at my direction and in my presence. I have reviewed the chart and agree that the record reflects my personal performance and is accurate and complete  Electronically signed: CLAUDIA Lee        RICKY HAMPTON  MRN-[id number]  Patient is a 66y old  Male who presents with a chief complaint of Anemia, Supratherapeutic INR, Dark Stools (19 Aug 2021 20:47)    HPI:  65M PMH ACC/AHA stage D HF due to NICM HM2 LVAD , TV annuloplasty ring 17 as destination therapy due to severe peripheral artery disease with significant stenosis  SIADH, Depression, CKD-3 with hyperkalemia, past E. coli UTIs, driveline drainage (21) and COVID-19 (back in 2020)  He was recently seen in clinic where he complained of abdominal pain and dark stools w constipation back in May. He presents to [facility] ER today weakness and fatigue, moderate and + Black stools for three days, on coumadin secondary to warfarin use in the setting of an LVAD. Patient has required transfusions for GIB in the past. Mostly recently back in 2021 pt had anemia with dark stools. No interventions was done at that time. However Last Endoscopy was done in 2020 (negative). Today labs show patient is anemic with H/H of 4.5/16.3,. INR is 8.84 MAP in the 90s, Temp 35.1. He denies any chest pain, shortness of breath, dizziness, abd pain, nausea or vomiting.       (2021 16:57)      Surgery/Hospital Course:   admit for melena w/ anemia, INR 8.84   6/15 Capsul study (+) for small bowel bleed, balloon endoscopy (old blood in prox ileum); post EGD - septic w/ L opacity, re-intubated for concern for aspiration, TTE (Mod MR, decrease biV w/ interventricular septum boweing towards R)   bronch    +C Diff    TC    CT C/A/P: Fluid filled colon which may be 2/2 rapid transit. Small bilateral pleffs with associates. Compressive atelectasis New ISABELLE & LLL  parenchymal opacities, suspicious for pneumonia. 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    #8 Shiley trach at bedside    CPAP trials    LVAD speed increased to 9200   Bronch; Central line dc'ed   TC since , continue as tolerated. Patient transferred to SDU.    Cont PT, no trach downsize today(#8cuffed)/ Anticoagulation/ Continue TF, speech f/u/ Vanco taper    oob to chair  INR  2.47  5 mg coumadin     increased lop to 25 q8  per HF   INR today 2.64.  H&H 7.3 this AM.  Will repeat CBC at noon, and will send stool guaiac Patient with persistent abdominal tenderness, rate of tube feeds decreased.  No nausea/vomiting.     INR today 2.4H&H 9.1.6 low flow overnight /N&V  NPO resolved for capsule study  Coumadin on hold refusing Tube feeds on D5 1/2 normal  @50 cc/hr   INR 2.69  H&H 7..1 refusing Tube feeds on D5 1/2 normal  @50 cc/hr. This am + BM Anusha Oneill HF  aware- PRBC x1  GI team consulted -  NPO  plan on study in am-  D/w Dr Cadet Patient  to return to CTU for further management; 1PRBCS    Post op INR 2.2 today.  No bleeding. BC + for SM.  Pt is hypotensive requiring pressor and inotropic support.  ID follow up today on Cefepime will follow.   R PTC for PTX    CT C/A/P: sub q emphysema in R chest wall, GGO RUL, small ascites CTH negative; Abd US: GB thickening, pericholecystic fluid     Perchole drain in place continues to drain total output overnight 133.  Fever today 38.8 given tylenol.  Will repeat BC now.     duplex LE negative    Patient with persistent abdominal pain, refusing tube feeds and medications, Psych consulted   CTA A/P ordered to r/o mesenteric ischemia 2/2 persistent anorexia, nausea, vomiting. Revealed:  Evaluation of the mesenteric vessels is limited by streak artifact from LVAD. There appears to be severe stenosis of the proximal SMA; abdominal mesenteric doppler is recommended for further evaluation. 2.  No small bowel findings to suggest acute mesenteric ischemia. 3.  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  8/15: Cultures resulted BC 1/2 +GPC in clusters, SC enterobacter; mesenteric duplex: borderline stenosis of proximal SMA     Today:  Tube feeds held overnight, plan to restart. TC since , continue as tolerated. Continue ambulation as tolerated. pt tachycardic today. gave 1 dose of lopressor ivp. S&S by bedside today     REVIEW OF SYSTEMS:  Talks over trach   Gen: No fever  EYES/ENT: No visual changes;  No vertigo or throat pain   NECK: No pain   RES:  No shortness of breath or Cough  CARD: No chest pain   GI: No abdominal pain  : No dysuria  NEURO: No weakness  SKIN: No itching, rashes     ICU Vital Signs Last 24 Hrs  T(C): 37 (20 Aug 2021 03:00), Max: 37.4 (19 Aug 2021 23:00)  T(F): 98.6 (20 Aug 2021 03:00), Max: 99.3 (19 Aug 2021 23:00)  HR: 99 (20 Aug 2021 07:00) (91 - 114)  BP: --  BP(mean): 84 (19 Aug 2021 23:20) (84 - 84)  ABP: 101/71 (20 Aug 2021 07:00) (90/69 - 105/76)  ABP(mean): 83 (20 Aug 2021 07:00) (75 - 87)  RR: 17 (20 Aug 2021 07:00) (15 - 39)  SpO2: 94% (20 Aug 2021 07:00) (93% - 100%)      Physical Exam:  Gen:  awake and alert, NAD, Malnourished  CNS:  intact, nonfocal, responds to all commands  Neck: no JVD, +TC   RES : course breath sounds, no wheezing              CVS: +LVAD hum   Abd: Soft, minimally-moderately tender in RUQ by perc dawn site, NT everywhere else, positive BS throughout. Perc dawn drain site c/d/i draining bilious fluid.  Skin: No rash  Ext:  no edema    ============================I/O===========================   I&O's Detail    19 Aug 2021 07:01  -  20 Aug 2021 07:00  --------------------------------------------------------  IN:    dextrose 5% + sodium chloride 0.45%: 400 mL    Enteral Tube Flush: 320 mL    IV PiggyBack: 250 mL    Miscellaneous Tube Feedin mL  Total IN: 1150 mL    OUT:    Drain (mL): 350 mL    Voided (mL): 1575 mL  Total OUT: 1925 mL    Total NET: -775 mL        ============================ LABS =========================                        9.1    15.77 )-----------( 245      ( 20 Aug 2021 00:37 )             29.0     08-20    129<L>  |  91<L>  |  9   ----------------------------<  88  3.8   |  26  |  0.54    Ca    10.2      20 Aug 2021 00:37  Phos  3.5     08-20  Mg     1.6     08-20    TPro  8.2  /  Alb  3.4  /  TBili  0.6  /  DBili  x   /  AST  22  /  ALT  37  /  AlkPhos  190<H>  08-20    LIVER FUNCTIONS - ( 20 Aug 2021 00:37 )  Alb: 3.4 g/dL / Pro: 8.2 g/dL / ALK PHOS: 190 U/L / ALT: 37 U/L / AST: 22 U/L / GGT: x             ABG - ( 20 Aug 2021 00:13 )  pH, Arterial: 7.40  pH, Blood: x     /  pCO2: 51    /  pO2: 140   / HCO3: 32    / Base Excess: 5.2   /  SaO2: 99.6                ======================Micro/Rad/Cardio=================  Culture: Reviewed   CXR: Reviewed  Echo:Reviewed  ======================================================  PAST MEDICAL & SURGICAL HISTORY:  CHF (congestive heart failure)    CAD (coronary artery disease)    Depression    Pleural effusion    History of 2019 novel coronavirus disease (COVID-19)  2020    Hemorrhoids    Bleeding hemorrhoids    Peripheral arterial disease    Claudication    BPH with urinary obstruction    ACC/AHA stage D systolic heart failure    Anticoagulation goal of INR 2.0 to 2.5    Falls    Clavicle fracture    CKD (chronic kidney disease), stage III    Iron deficiency anemia    H/O epistaxis    Vertigo    GI bleed    S/P TVR (tricuspid valve repair)    S/P ventricular assist device    S/P endoscopy      ====================ASSESSMENT ==============  Stage D Nonischemic Cardiomyopathy, Status Post HM2 on 2017    Cardiogenic shock  Hemodynamic instability   Acute hypoxemic respiratory failure  GI bleed , Status Post Enteroscopy   Anemia, in setting of melena   Chronic Kidney Disease  Stress hyperglycemia   C.diff positive on    Hypovolemic shock  Septic shock  Leukocytosis    Plan:  ====================== NEUROLOGY=====================  Nonfocal, continue to monitor neuro status   Tylenol PRN for analgesia   C/w mirtazapine for depression  Ambulate as tolerated     acetaminophen    Suspension .. 650 milliGRAM(s) Oral every 6 hours PRN Mild Pain (1 - 3)  mirtazapine 7.5 milliGRAM(s) Oral daily    ==================== RESPIRATORY======================  Acute hypoxemic respiratory failure s/p #8 shiley trach on ; patient put back on assist control on  2/2 worsening of clinical condition, continue TC as tolerated (TC since )    Continue close monitoring of respiratory rate and breathing pattern, following of ABG’s with A-line monitoring, continuous pulse oximetry monitoring.     Mechanical Ventilation:  Mode: off  FiO2: 40      ====================CARDIOVASCULAR==================  Stage D Nonischemic Cardiomyopathy, Status Post HM2 on 2017; LVAD settings 9200, flow 5.8   TTE : reveals at least moderate MR, mild AI, severe global LV syst dysfxn, dec RV fxn   Continue invasive hemodynamic monitoring   Propranolol for rate control of HR 2/2 Hyperthyroidism, titrate as tolerated- pt mroe tachy today. gave lopressor 5 ivp x1     propranolol 20 milliGRAM(s) Oral every 8 hours    ===================HEMATOLOGIC/ONC ===================  Acute blood loss anemia, monitor H&H/Plts    Continue monitor LDH daily     VTE prophylaxis, heparin   Injectable 5000 Unit(s) SubCutaneous every 8 hours    ===================== RENAL =========================  Continue to monitor I/Os, BUN/Creatinine, and urine output.   Goal net negative fluid balance. Replete lytes PRN. Keep K> 4 and Mg >2.     ==================== GASTROINTESTINAL===================  S/p cholecystostomy tube placed on : with IR with -60cc of black bile, will monitor site closely.   Recent emesis on  in setting of abdominal pain, as per GI likely component of illeus given critical illness   CTA A/P : Evaluation of the mesenteric vessels is limited by streak artifact from LVAD. There appears to be severe stenosis of the proximal SMA; abdominal mesenteric doppler is recommended for further evaluation. 2.  No small bowel findings to suggest acute mesenteric ischemia. 3.  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  Vascular surgery and IR consulted for potential SMA stent. Both teams state that SMA stent is not warranted as they don't believe SMA stenosis is as severe on CTA as read by radiology and do not believe that this stenosis is causing patient's intolerance to TFs  Mesenteric duplex on 8/15 borderline stenosis of proximal SMA   TwoCal HN feeds, held overnight, plan to restart today   Zofran PRN for nausea   Reglan for gut motility   S&S by bedside on , will re-eval tomm     dextrose 5% + sodium chloride 0.45%. 1000 milliLiter(s) (50 mL/Hr) IV Continuous <Continuous>  multivitamin 1 Tablet(s) Oral daily  GI prophylaxis, pantoprazole  Injectable 40 milliGRAM(s) IV Push every 12 hours  thiamine 100 milliGRAM(s) Oral daily  ondansetron Injectable 4 milliGRAM(s) IV Push every 6 hours PRN Nausea and/or Vomiting  metoclopramide Injectable 10 milliGRAM(s) IV Push every 8 hours    =======================    ENDOCRINE  =====================  Stress hyperglycemia, continue glucose control with admelog sliding scale   Thyroid US to evaluate for thyroid nodule/goiter in setting of hyperthyroid  - contraindicated at this time 2/2 trach/will do when trach is out   C/w methimazole in setting of low TSH    insulin lispro (ADMELOG) corrective regimen sliding scale   SubCutaneous every 6 hours  methimazole 10 milliGRAM(s) Oral every 8 hours    ========================INFECTIOUS DISEASE================  Afebrile, WBC rising from 12.13 -> 15.41 -> 15.77  Monitor temperature and trend WBC.   BC 1/2 +GPC in clusters, SC enterobacter(CRE). C/w cefepime and vancomycin       cefepime   IVPB 1000 milliGRAM(s) IV Intermittent every 8 hours  vancomycin    Solution 125 milliGRAM(s) Oral every 6 hours      Patient requires continuous monitoring with bedside rhythm monitoring, pulse ox monitoring, and intermittent blood gas analysis. Care plan discussed with ICU care team. Patient remained critical and at risk for life threatening decompensation.     By signing my name below, Agnieszka STANLEY, attest that this documentation has been prepared under the direction and in the presence of CLAUDIA Lee   Electronically signed: Cesia Shaffer, 21 @ 08:35    Georgina STANLEY, personally performed the services described in this documentation. all medical record entries made by the scribe were at my direction and in my presence. I have reviewed the chart and agree that the record reflects my personal performance and is accurate and complete  Electronically signed: CLAUDIA Lee

## 2021-08-21 NOTE — PROGRESS NOTE ADULT - ASSESSMENT
Assessment  Hyperthyroidism: 66y Male with no history thyroid disease, was not on any thyroid supplements, previously euthyroid (June 2021), was on amiodarone (last dose 8/7), now in hyperthyroid state, Hashimotos, started on Methimazole 10mg TID and propanolol for tachycardia. Methimazole restarted once LFTs improved, LFTs fluctuating, remains hyperthyroid, Palliative Care on board for goals of care.  Hyperglycemia: Patient in nondiabetic range, A1C 5.6%, now on insulin coverage, blood sugars in acceptable range, no hypoglycemias, on TFs.  CHF: on medications, stable, monitored.  Anemia: Monitored      Pavan Barone MD  Cell: [phone]  Office: [phone]

## 2021-08-21 NOTE — PROGRESS NOTE ADULT - SUBJECTIVE AND OBJECTIVE BOX
RICKY JOINT  MRN#: [id number]  Subjective:  Pulmonary progress  : recurrent Acute hypoxic respiratory Failure ,aspiration pneumonia, NICM  , chart reviewed and H/O obtained radiological and Laboratory study reviewed patient Examined     65M PMH ACC/AHA stage D HF due to NICM HM2 LVAD , TV annuloplasty ring 17 as destination therapy due to severe peripheral artery disease with significant stenosis  SIADH, Depression, CKD-3 with hyperkalemia, past E. coli UTIs, driveline drainage (21) and COVID-19 (back in 2020)  He was recently seen in clinic where he complained of abdominal pain and dark stools w constipation back in May. He presents to [facility] ER today weakness and fatigue, moderate and + Black stools for three days, on coumadin secondary to warfarin use in the setting of an LVAD. Patient has required transfusions for GIB in the past. Mostly recently back in 2021 pt had anemia with dark stools. No interventions was done at that time. However Last Endoscopy was done in 2020 (negative). Today labs show patient is anemic with H/H of 4.5/16.3,. INR is 8.84 MAP in the 90s, Temp 35.1. He denies any chest pain, shortness of breath, dizziness, abd pain, nausea or vomiting. found to have  rectal bleeding underwent endoscopy ,old blood in the proximal ileum ,  develop sepsis with LL opacity given Antibiotics , Extubated , reintubated , Bronchoscopy on Zosyn for LL pneumonia  and Amiodarone S/P TV Annuloplasty , patient remain intubated on full ventilatory support .S/P multiple units of blood transfusion , remain on full ventilatory support on Precedex and propofol , new central IJ line , diarrhea C diff. +ve on po vancomycin and IV Flagyl,  mildly distended belly , fever start on cefepime 2gm q 8 hrs S/P tracheostomy .  new RT Subclavian central line continue on contact  isolation ,still diarrhea on C-diff antibiotics ENT follow up appreciated , trial of C-PAP as tolerated , , copious secretion from trach. site chest x ray left lower lobe pneumonia , tolerating trch. collar 50% FI02 still excessive secretion need pulmonary toilet , off Ancef antibiotic , no more diarrhea back on full support mechanical ventilator , chest x ray show improvement in LLL air space disease, more awake and responsive on tube feeding no more diarrhea , S/P  Ancef for bacteremia no fever ,ID follow up noted ,  no nausea or vomiting or diarrhea still very weak and tired , event note pulled NG tube now replaced , back on tube feeding ,still on po vancomycin , getting PT and OT at bed side , no plan for decannulation for now. , no more diarrhea receiving PT and OPT at bed side , minimal secretion from tracheostomy site , no SOB getting stronger , improve muscle tone patient transfer to monitor bed still on contact isolation for C-Difficel colitis on 50% FI02, NG tube clogged , and change to resume tube feeding still loose stool . H/H drop significantly require blood transfusion , most likely GI bleeding , IV heparin D/C , vomiting 200 cc of creamy color tube feeding on hold no sob, still melena monitor in the CTU possible capsule endoscopy , H/H is stable ., patient develop TR sided  pneumothorax require chest tube placement , RT IJ central line  placed , develop fever shaking chills , blood culture positive for serratia marcescens , start on cefepime .the patient  become hypoxic and hypotensive placed on full ventilatory support and Vasopressin , levophed and Dobutamine ,S/P blood transfusion on meropenem and vancomycin , IR note appreciated   , on and  off pressors , occasional agitation on Precedex .S/P IR cholecystostomy tube drainage placement in the RT upper Quadrant , resume anticoagulation chest x ray noted C-PAP trail lasted only for 2 hrs , new RT SC line and D/C RT IJ line , RT pig tail cathter has been removed , tolerating C-PAP trial placed on trach. collar 50% FI02 GI consultant noted on NG tube feeding as tolerated , develop AF RVR S/P  bolus Amiodarone  back to regular sinus Rhythm , Flat Affect depressed , back on tube feeding Vital AF at 60 cc/ hr .still intermittent abdominal pain , no fever saturation is accepted  back on full ventilatory support , tired and sleepy on round   Dark stool evaluated by Vascular service recommend mesenteric Duplex. new Rt IJ line , C-PAP trial  wean off ventilator  to trah.collar 50% FI02,  palliative care note appreciated . vascular mesenteric Dopplex noted no evidence of severe  mesenteric arteries thrombosis .start  back on  tube feeding . tolerating it very well        (2021 16:57)    PAST MEDICAL & SURGICAL HISTORY:  CHF (congestive heart failure)    CAD (coronary artery disease)  Depression    Pleural effusion    History of 2019 novel coronavirus disease (COVID-19)  2020    Hemorrhoids    Bleeding hemorrhoids    Peripheral arterial disease    Claudication    BPH with urinary obstruction    ACC/AHA stage D systolic heart failure  Anticoagulation goal of INR 2.0 to 2.5    Falls    Clavicle fracture    CKD (chronic kidney disease), stage III    Iron deficiency anemia    H/O epistaxis    Vertigo    GI bleed    S/P TVR (tricuspid valve repair)    S/P ventricular assist device    S/P endoscopy    OBJECTIVE:  ICU Vital Signs Last 24 Hrs  T(C): 38.2 (2021 10:00), Max: 38.5 (2021 12:00)  T(F): 100.8 (2021 10:00), Max: 101.3 (2021 12:00)  HR: 65 (2021 10:00) (61 - 69)  BP: --  BP(mean): --  ABP: 105/67 (2021 10:00) (90/54 - 113/64)  ABP(mean): 77 (2021 10:00) (63 - 77)  RR: 20 (2021 10:00) (19 - 35)  SpO2: 99% (2021 10:00) (96% - 100%)       @ : @ 07:00  --------------------------------------------------------  IN: 2693.9 mL / OUT: 1415 mL / NET: 1278.9 mL     @ 07: @ 10:49  --------------------------------------------------------  IN: 420.8 mL / OUT: 115 mL / NET: 305.8 mL  PHYSICAL EXAM:Daily   Elderly male S/P tracheostomy on trach. collar 50% FI02 ,  Daily Weight in k.4 (2021 00:00)  HEENT:     + NCAT  + EOMI  - Conjuctival edema   - Icterus   - Thrush   - ETT  + NGT/OGT  Neck:         + FROM  RT IJ  line JVD  - Nodes - Masses + Mid-line trachea + Tracheostomy  Chest:            normal A-P diameter    Lungs:          + CTA   + Rhonchi    - Rales    - Wheezing + Decreased  LT BS   - Dullness R L  Cardiac:       + S1 + S2    + RRR   - Irregular   - S3  - S4    - Murmurs   - Rub   - Hamman’s sign   Abdomen:    + BS  + Soft + Non-tender - Distended - Organomegaly - PEG .cholecystostomy tube in place  Extremities:   - Cyanosis U/L   - Clubbing  U/L  + LE/UE Edema   + Capillary refill    + Pulses   Neuro:        - Awake   -  Alert   - Confused   - Lethargic   + Sedated  + Generalized Weakness  Skin:        - Rashes    - Erythema   + Normal incisions   + IV sites intact          HOSPITAL MEDICATIONS: All medications reviewed and analyzed  MEDICATIONS  (STANDING):  amiodarone    Tablet 200 milliGRAM(s) Oral daily  chlorhexidine 0.12% Liquid 15 milliLiter(s) Oral Mucosa every 12 hours  chlorhexidine 2% Cloths 1 Application(s) Topical <User Schedule>  dexmedetomidine Infusion 0.5 MICROgram(s)/kG/Hr (9.81 mL/Hr) IV Continuous <Continuous>  dextrose 50% Injectable 50 milliLiter(s) IV Push every 15 minutes  heparin  Infusion 400 Unit(s)/Hr (12.5 mL/Hr) IV Continuous <Continuous>  Hydromorphone  Injectable 0.5 milliGRAM(s) IV Push once  insulin lispro (ADMELOG) corrective regimen sliding scale   SubCutaneous every 6 hours  pantoprazole  Injectable 40 milliGRAM(s) IV Push every 12 hours  piperacillin/tazobactam IVPB.. 3.375 Gram(s) IV Intermittent every 8 hours  propofol Infusion 20 MICROgram(s)/kG/Min (9.42 mL/Hr) IV Continuous <Continuous>  sodium chloride 0.9% lock flush 3 milliLiter(s) IV Push every 8 hours  sodium chloride 0.9%. 1000 milliLiter(s) (10 mL/Hr) IV Continuous <Continuous>    MEDICATIONS  (PRN):  acetaminophen    Suspension .. 650 milliGRAM(s) Oral every 6 hours PRN Temp greater or equal to 38C (100.4F)    LABS: All Lab data reviewed and analyzed                        9.3    14.24 )-----------( 283      ( 21 Aug 2021 00:37 )             29.4    08-    130<L>  |  92<L>  |  13  ----------------------------<  193<H>  4.2   |  25  |  0.58    Ca    9.8      21 Aug 2021 00:37  Phos  2.9     08-  Mg     1.7         TPro  8.1  /  Alb  3.3  /  TBili  0.4  /  DBili  x   /  AST  20  /  ALT  34  /  AlkPhos  205<H>      Ca    10.2      20 Aug 2021 00:37  Phos  3.5     08-  Mg     1.6     -    TPro  8.2  /  Alb  3.4  /  TBili  0.6  /  DBili  x   /  AST  22  /  ALT  37  /  AlkPhos  190<H>                                                                                                 PTT - ( 2021 04:52 )  PTT:45.2 sec LIVER FUNCTIONS - ( 2021 00:42 )  Alb: 3.4 g/dL / Pro: 6.7 g/dL / ALK PHOS: 213 U/L / ALT: 15 U/L / AST: 24 U/L / GGT: x           RADIOLOGY: - Reviewed and analyzed RT Pig tail cathter  , LVAD HM2, CT scan of abdomen reviewed result noted

## 2021-08-21 NOTE — PROGRESS NOTE ADULT - SUBJECTIVE AND OBJECTIVE BOX
Chief complaint    Patient is a 66y old  Male who presents with a chief complaint of Anemia, Supratherapeutic INR, Dark Stools (21 Aug 2021 14:23)   Review of systems  Patient in bed, appears comfortable.    Labs and Fingersticks  CAPILLARY BLOOD GLUCOSE  190 (21 Aug 2021 00:00)      POCT Blood Glucose.: 165 mg/dL (21 Aug 2021 13:27)  POCT Blood Glucose.: 202 mg/dL (21 Aug 2021 05:45)  POCT Blood Glucose.: 190 mg/dL (20 Aug 2021 23:55)      Anion Gap, Serum: 13 (08-21 @ 00:37)  Anion Gap, Serum: 12 (08-20 @ 00:37)      Calcium, Total Serum: 9.8 (08-21 @ 00:37)  Calcium, Total Serum: 10.2 (08-20 @ 00:37)  Albumin, Serum: 3.3 (08-21 @ 00:37)  Albumin, Serum: 3.4 (08-20 @ 00:37)    Alanine Aminotransferase (ALT/SGPT): 34 (08-21 @ 00:37)  Alanine Aminotransferase (ALT/SGPT): 37 (08-20 @ 00:37)  Alkaline Phosphatase, Serum: 205 *H* (08-21 @ 00:37)  Alkaline Phosphatase, Serum: 190 *H* (08-20 @ 00:37)  Aspartate Aminotransferase (AST/SGOT): 20 (08-21 @ 00:37)  Aspartate Aminotransferase (AST/SGOT): 22 (08-20 @ 00:37)        08-21    130<L>  |  92<L>  |  13  ----------------------------<  193<H>  4.2   |  25  |  0.58    Ca    9.8      21 Aug 2021 00:37  Phos  2.9     08-21  Mg     1.7     08-21    TPro  8.1  /  Alb  3.3  /  TBili  0.4  /  DBili  x   /  AST  20  /  ALT  34  /  AlkPhos  205<H>  08-21                        9.3    14.24 )-----------( 283      ( 21 Aug 2021 00:37 )             29.4     Medications  MEDICATIONS  (STANDING):  cefepime   IVPB      cefepime   IVPB 1000 milliGRAM(s) IV Intermittent every 8 hours  chlorhexidine 0.12% Liquid 15 milliLiter(s) Oral Mucosa every 12 hours  chlorhexidine 2% Cloths 1 Application(s) Topical <User Schedule>  heparin   Injectable 5000 Unit(s) SubCutaneous every 8 hours  insulin lispro (ADMELOG) corrective regimen sliding scale   SubCutaneous every 6 hours  methimazole 10 milliGRAM(s) Oral every 8 hours  metoclopramide Injectable 10 milliGRAM(s) IV Push every 8 hours  mirtazapine 7.5 milliGRAM(s) Oral daily  multivitamin 1 Tablet(s) Oral daily  pantoprazole  Injectable 40 milliGRAM(s) IV Push every 12 hours  propranolol 20 milliGRAM(s) Oral every 8 hours  thiamine 100 milliGRAM(s) Oral daily  vancomycin    Solution 125 milliGRAM(s) Oral every 6 hours      Physical Exam  General: Patient comfortable in bed  Vital Signs Last 12 Hrs  T(F): 97.3 (08-21-21 @ 11:00), Max: 99.3 (08-21-21 @ 04:00)  HR: 102 (08-21-21 @ 15:02) (96 - 121)  BP: --  BP(mean): --  RR: 33 (08-21-21 @ 15:02) (6 - 48)  SpO2: 100% (08-21-21 @ 15:02) (95% - 100%)  Neck: No palpable thyroid nodules.  CVS: S1S2, No murmurs  Respiratory: No wheezing, no crepitations  GI: Abdomen soft, bowel sounds positive  Musculoskeletal:  edema lower extremities.     Diagnostics    Free Thyroxine, Serum: AM Sched. Collection: 22-Aug-2021 06:00 (08-21 @ 10:21)  Free Thyroxine, Serum: AM Sched. Collection: 19-Aug-2021 06:00 (08-18 @ 11:22)  Thyroid Stimulating Hormone, Serum: AM Sched. Collection: 19-Aug-2021 06:00 (08-18 @ 11:22)  Free Thyroxine, Serum: AM Sched. Collection: 18-Aug-2021 06:00 (08-17 @ 22:44)  Free Thyroxine, Serum: AM Sched. Collection: 12-Aug-2021 06:00 (08-11 @ 10:29)

## 2021-08-21 NOTE — CHART NOTE - NSCHARTNOTEFT_GEN_A_CORE
This is a 66 year old male with PMH of ACC/AHA stage D HF due to NICM HM2 LVAD , TV annuloplasty ring 9/12/17 as destination therapy due to severe peripheral artery disease with significant stenosis  SIADH, Depression, CKD-3 with hyperkalemia, past E. coli UTIs, driveline drainage (1/7/21) and COVID-19 (back in April 2020) admitted to CTU due to GIB and shock on June 14, 2021. Patient has had a long hospital course. Patient now refusing tube feeding with complaints of abdominal pain. Ethics consult initiated for a patient who has questionable capacity and is refusing necessary nutrition. Discussed with LISA Banegas MD (Palliative Care Attending).      [facility] evaluated the patient on 8/19/21, but was not able to ascertain capacity for the decision of nutrition and hydration. They were able to ascertain that he has abdominal pain, which could be the reason for his refusal of tube feeds.       CL service evaluated the patient on 8/19/21 for assessment of depression in the context of patient refusing tube feeds. As per  notes, the patient does not currently meet criteria for MDD.  also noted, the patient's behavior could be explained by adjustment d/o in the context of his complex medical picture, also mostly delirium. Capacity was not assessed.      Mr. Quispe also has a dutiful HCP, his sister Alyssa, should the patient lack capacity for medical decisions including those related to artificial nutrition and hydration. At present, she has been unavailable. [facility] will attempt to contact.      GI recommendations on 8/20 inlcude continue with PPI BID and continue with TF as patient allows, with TID reglan to help with motility given critical illness and likely component of gastroparesis.     The question remains, is it ethically permissible to forego enteral feeding as Mr. Quispe has a terminal condition of heart failure, meeting criteria of [place] to withhold or withdraw life sustaining treatments? Does the patient have capacity for this decision? If he does not have capacity, we need to revisit goals of care with his HCP, Alyssa. If his HCP is in favor of continued feeds, the issue becomes one of treatment over objection as patient is refusing. If a patient is deemed without capacity for a medical decision, they still have the RIGHT to refuse. In those cases, if not EMERGENT (risk to life or limb), a court order for treatment over objection must be pursued.      Ethics consult in progress. To follow up with primary team, , Palliative Care, patient’s HCP and patient.     Jcalyn Green MD  Ethics Attending  [phone] This is a 66 year old male with PMH of ACC/AHA stage D HF due to NICM HM2 LVAD , TV annuloplasty ring 9/12/17 as destination therapy due to severe peripheral artery disease with significant stenosis  SIADH, Depression, CKD-3 with hyperkalemia, past E. coli UTIs, driveline drainage (1/7/21) and COVID-19 (back in April 2020) admitted to CTU due to GIB and shock on June 14, 2021. Patient has had a long hospital course. Patient now refusing tube feeding with complaints of abdominal pain. Ethics consult initiated for a patient who has questionable capacity and is refusing necessary nutrition. Discussed with LISA Banegas MD (Palliative Care Attending).      [facility] evaluated the patient on 8/19/21, but was not able to ascertain capacity for the decision of nutrition and hydration. They were able to ascertain that he has abdominal pain, which could be the reason for his refusal of tube feeds.       CL service evaluated the patient on 8/19/21 for assessment of depression in the context of patient refusing tube feeds. As per  notes, the patient does not currently meet criteria for MDD.  also noted, the patient's behavior could be explained by adjustment d/o in the context of his complex medical picture, also mostly delirium. Capacity was not assessed.      Mr. Quispe also has a dutiful HCP, his sister Alyssa, should the patient lack capacity for medical decisions including those related to artificial nutrition and hydration. At present, she has been unavailable. [facility] will attempt to contact.      GI recommendations on 8/20 inlcude continue with PPI BID and continue with TF as patient allows, with TID reglan to help with motility given critical illness and likely component of gastroparesis.     The question remains, is it ethically permissible to forego enteral feeding as Mr. Quispe has a terminal condition of heart failure, meeting criteria of [place] to withhold or withdraw life sustaining treatments? Does the patient have capacity for this decision? Is this refusal of nutrition due to his depression and is it reversible? If he does not have capacity, we need to revisit goals of care with his HCP, Alyssa. If his HCP is in favor of continued feeds, the issue becomes one of treatment over objection as patient is refusing. If a patient is deemed without capacity for a medical decision, they still have the RIGHT to refuse. In those cases, if not EMERGENT (risk to life or limb), a court order for treatment over objection must be pursued.      Ethics consult in progress. To follow up with primary team, , Palliative Care, patient’s HCP and patient.     Jaclyn Green MD  Ethics Attending  [phone]

## 2021-08-21 NOTE — PROGRESS NOTE ADULT - SUBJECTIVE AND OBJECTIVE BOX
RICKY HAMPTON  MRN-[id number]  Patient is a 66y old  Male who presents with a chief complaint of Anemia, Supratherapeutic INR, Dark Stools (19 Aug 2021 20:47)    HPI:  65M PMH ACC/AHA stage D HF due to NICM HM2 LVAD , TV annuloplasty ring 9/12/17 as destination therapy due to severe peripheral artery disease with significant stenosis  SIADH, Depression, CKD-3 with hyperkalemia, past E. coli UTIs, driveline drainage (1/7/21) and COVID-19 (back in April 2020)  He was recently seen in clinic where he complained of abdominal pain and dark stools w constipation back in May. He presents to [facility] ER today weakness and fatigue, moderate and + Black stools for three days, on coumadin secondary to warfarin use in the setting of an LVAD. Patient has required transfusions for GIB in the past. Mostly recently back in jan 2021 pt had anemia with dark stools. No interventions was done at that time. However Last Endoscopy was done in July 2020 (negative). Today labs show patient is anemic with H/H of 4.5/16.3,. INR is 8.84 MAP in the 90s, Temp 35.1. He denies any chest pain, shortness of breath, dizziness, abd pain, nausea or vomiting.       (14 Jun 2021 16:57)      Surgery/Hospital Course:  6/14 admit for melena w/ anemia, INR 8.84   6/15 Capsul study (+) for small bowel bleed, balloon endoscopy (old blood in prox ileum); post EGD - septic w/ L opacity, re-intubated for concern for aspiration, TTE (Mod MR, decrease biV w/ interventricular septum boweing towards R)  6/17 bronch   6/20 +C Diff   6/22 TC   6/22 CT C/A/P: Fluid filled colon which may be 2/2 rapid transit. Small bilateral pleffs with associates. Compressive atelectasis New ISABELLE & LLL  parenchymal opacities, suspicious for pneumonia. 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   6/25 #8 Shiley trach at bedside   6/28 CPAP trials   7/1 LVAD speed increased to 9200  7/2 Bronch; Central line dc'ed  7/20 TC since 7/7, continue as tolerated. Patient transferred to SDU.   7/21 Cont PT, no trach downsize today(#8cuffed)/ Anticoagulation/ Continue TF, speech f/u/ Vanco taper  7/22  oob to chair  INR  2.47  5 mg coumadin     increased lop to 25 q8  per HF  7/23 INR today 2.64.  H&H 7.3/24 this AM.  Will repeat CBC at noon, and will send stool guaiac Patient with persistent abdominal tenderness, rate of tube feeds decreased.  No nausea/vomiting.    7/24 INR today 2.4H&H 9.1/28.6 low flow overnight /N&V  NPO resolved for capsule study mondayn Coumadin on hold refusing Tube feeds on D5 1/2 normal  @50 cc/hr  7/25 INR 2.69  H&H 7.7/25.1 refusing Tube feeds on D5 1/2 normal  @50 cc/hr. This am + BM Anusha Oneill HF  aware- PRBC x1  GI team consulted -  NPO  plan on study in am-  D/w Dr Cadet Patient  to return to CTU for further management; 1PRBCS   7/27 Post op INR 2.2 today.  No bleeding. BC + for SM.  Pt is hypotensive requiring pressor and inotropic support.  ID follow up today on Cefepime will follow.  7/26 R PTC for PTX   7/28 CT C/A/P: sub q emphysema in R chest wall, GGO RUL, small ascites CTH negative; Abd US: GB thickening, pericholecystic fluid    7/31 Perchole drain in place continues to drain total output overnight 133.  Fever today 38.8 given tylenol.  Will repeat BC now.    8/1 duplex LE negative   8/7 Patient with persistent abdominal pain, refusing tube feeds and medications, Psych consulted  8/13 CTA A/P ordered to r/o mesenteric ischemia 2/2 persistent anorexia, nausea, vomiting. Revealed:  Evaluation of the mesenteric vessels is limited by streak artifact from LVAD. There appears to be severe stenosis of the proximal SMA; abdominal mesenteric doppler is recommended for further evaluation. 2.  No small bowel findings to suggest acute mesenteric ischemia. 3.  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  8/15: Cultures resulted BC 1/2 +GPC in clusters, SC enterobacter; mesenteric duplex: borderline stenosis of proximal SMA     Today: No acute events    REVIEW OF SYSTEMS:  Talks over trach   Gen: No fever  EYES/ENT: No visual changes;  No vertigo or throat pain   NECK: No pain   RES:  No shortness of breath or Cough  CARD: No chest pain   GI: No abdominal pain  : No dysuria  NEURO: No weakness  SKIN: No itching, rashes     ICU Vital Signs Last 24 Hrs  T(C): 36.1 (21 Aug 2021 15:00), Max: 38 (20 Aug 2021 20:00)  T(F): 97 (21 Aug 2021 15:00), Max: 100.4 (20 Aug 2021 20:00)  HR: 102 (21 Aug 2021 15:02) (96 - 125)  ABP: 95/74 (21 Aug 2021 15:00) (71/71 - 107/77)  ABP(mean): 83 (21 Aug 2021 15:00) (66 - 89)  RR: 33 (21 Aug 2021 15:02) (6 - 48)  SpO2: 100% (21 Aug 2021 15:02) (95% - 100%)      I&O's Summary    20 Aug 2021 07:01  -  21 Aug 2021 07:00  --------------------------------------------------------  IN: 1800 mL / OUT: 850 mL / NET: 950 mL    21 Aug 2021 07:01  -  21 Aug 2021 17:06  --------------------------------------------------------  IN: 455 mL / OUT: 200 mL / NET: 255 mL          Physical Exam:  Gen:  awake and alert, NAD, Malnourished  CNS:  intact, nonfocal, responds to all commands  Neck: no JVD, +TC   RES : course breath sounds, no wheezing              CVS: +LVAD hum   Abd: Soft, minimally-moderately tender in RUQ by perc dawn site, NT everywhere else, positive BS throughout. Perc dawn drain site c/d/i draining bilious fluid.  Skin: No rash  Ext:  no edema    ============================ LABS =========================                        9.3    14.24 )-----------( 283      ( 21 Aug 2021 00:37 )             29.4     08-21    130<L>  |  92<L>  |  13  ----------------------------<  193<H>  4.2   |  25  |  0.58    Ca    9.8      21 Aug 2021 00:37  Phos  2.9     08-21  Mg     1.7     08-21    TPro  8.1  /  Alb  3.3  /  TBili  0.4  /  DBili  x   /  AST  20  /  ALT  34  /  AlkPhos  205<H>  08-21    ABG - ( 21 Aug 2021 07:54 )  pH, Arterial: 7.42  pH, Blood: x     /  pCO2: 46    /  pO2: 127   / HCO3: 30    / Base Excess: 4.7   /  SaO2: 99.6        ======================Micro/Rad/Cardio=================  Culture: Reviewed   CXR: Reviewed  Echo:Reviewed  ======================================================  PAST MEDICAL & SURGICAL HISTORY:  CHF (congestive heart failure)    CAD (coronary artery disease)    Depression    Pleural effusion    History of 2019 novel coronavirus disease (COVID-19)  april 2020    Hemorrhoids    Bleeding hemorrhoids    Peripheral arterial disease    Claudication    BPH with urinary obstruction    ACC/AHA stage D systolic heart failure    Anticoagulation goal of INR 2.0 to 2.5    Falls    Clavicle fracture    CKD (chronic kidney disease), stage III    Iron deficiency anemia    H/O epistaxis    Vertigo    GI bleed    S/P TVR (tricuspid valve repair)    S/P ventricular assist device    S/P endoscopy      ====================ASSESSMENT ==============  Stage D Nonischemic Cardiomyopathy, Status Post HM2 on 9/2017    Cardiogenic shock  Hemodynamic instability   Acute hypoxemic respiratory failure  GI bleed , Status Post Enteroscopy 6/14  Anemia, in setting of melena   Chronic Kidney Disease  Stress hyperglycemia   C.diff positive on 6/20   Hypovolemic shock  Septic shock  Leukocytosis    Plan:  ====================== NEUROLOGY=====================  Nonfocal, continue to monitor neuro status   Tylenol PRN for analgesia   C/w mirtazapine for depression  Ambulate as tolerated     acetaminophen    Suspension .. 650 milliGRAM(s) Oral every 6 hours PRN Mild Pain (1 - 3)  mirtazapine 7.5 milliGRAM(s) Oral daily    ==================== RESPIRATORY======================  Acute hypoxemic respiratory failure s/p #8 shiley trach on 6/25; patient put back on assist control on 8/13 2/2 worsening of clinical condition, continue TC as tolerated (TC since 8/17)  Continue close monitoring of respiratory rate and breathing pattern, following of ABG’s with A-line monitoring, continuous pulse oximetry monitoring.     Mechanical Ventilation:  Mode: off  FiO2: 40      ====================CARDIOVASCULAR==================  Stage D Nonischemic Cardiomyopathy, Status Post HM2 on 9/2017; LVAD settings 9200, flow 5.8   TTE 7/26: reveals at least moderate MR, mild AI, severe global LV syst dysfxn, dec RV fxn   Continue invasive hemodynamic monitoring   Propranolol for rate control of HR 2/2 Hyperthyroidism, titrate as tolerated    propranolol 20 milliGRAM(s) Oral every 8 hours    ===================HEMATOLOGIC/ONC ===================  Acute blood loss anemia, monitor H&H/Plts    Continue monitor LDH daily     VTE prophylaxis, heparin   Injectable 5000 Unit(s) SubCutaneous every 8 hours    ===================== RENAL =========================  Continue to monitor I/Os, BUN/Creatinine, and urine output.   Goal net negative fluid balance. Replete lytes PRN. Keep K> 4 and Mg >2.     ==================== GASTROINTESTINAL===================  S/p cholecystostomy tube placed on 7/30: with IR with -60cc of black bile, will monitor site closely.   Recent emesis on 8/4 in setting of abdominal pain, as per GI likely component of illeus given critical illness   CTA A/P 8/13: Evaluation of the mesenteric vessels is limited by streak artifact from LVAD. There appears to be severe stenosis of the proximal SMA; abdominal mesenteric doppler is recommended for further evaluation. 2.  No small bowel findings to suggest acute mesenteric ischemia. 3.  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  Vascular surgery and IR consulted for potential SMA stent. Both teams state that SMA stent is not warranted as they don't believe SMA stenosis is as severe on CTA as read by radiology and do not believe that this stenosis is causing patient's intolerance to TFs  Mesenteric duplex on 8/15 borderline stenosis of proximal SMA   TwoCal HN feeds, held overnight, plan to restart today   Zofran PRN for nausea   Reglan for gut motility   S&S will re-eval      multivitamin 1 Tablet(s) Oral daily  GI prophylaxis, pantoprazole  Injectable 40 milliGRAM(s) IV Push every 12 hours  thiamine 100 milliGRAM(s) Oral daily  ondansetron Injectable 4 milliGRAM(s) IV Push every 6 hours PRN Nausea and/or Vomiting  metoclopramide Injectable 10 milliGRAM(s) IV Push every 8 hours    =======================    ENDOCRINE  =====================  Stress hyperglycemia, continue glucose control with admelog sliding scale   Thyroid US to evaluate for thyroid nodule/goiter in setting of hyperthyroid  - contraindicated at this time 2/2 trach/will do when trach is out   C/w methimazole in setting of low TSH    insulin lispro (ADMELOG) corrective regimen sliding scale   SubCutaneous every 6 hours  methimazole 10 milliGRAM(s) Oral every 8 hours    ========================INFECTIOUS DISEASE================  Afebrile, WBC rising from 12.13 -> 15.41 -> 15.77  Monitor temperature and trend WBC.   BC 1/2 +GPC in clusters, SC enterobacter(CRE). C/w cefepime.  Vancomycin  PO for c.dif ppx      cefepime   IVPB 1000 milliGRAM(s) IV Intermittent every 8 hours  vancomycin    Solution 125 milliGRAM(s) Oral every 6 hours      Patient requires continuous monitoring with bedside rhythm monitoring, pulse ox monitoring, and intermittent blood gas analysis. Care plan discussed with ICU care team. Patient remained critical and at risk for life threatening decompensation.

## 2021-08-22 NOTE — PROGRESS NOTE ADULT - ASSESSMENT
Assessment and Recommendation:   · Assessment	  Assessment and recommendation :  Recurrent Acute hypoxic respiratory Failure S/P tracheostomy on trach. collar  50% FI02,   Acute blood loss anemia S/P multiple  blood transfusion   S/P septic shock off vasopressin , levophed and off  Dobutamine   S/P cholecystostomy tube placement by IR    AF RVR back to regular sinus Rhythm   Non ischemic cardiomyopathy continue ACE inhibitor and B-Blockers   S/P Septic shock and cardiogenic shock   Stage D systolic heart failure S/P LVAD HM2   MH2 LVAD  with  TV Annuloplasty  Severe peripheral vascular disease   no evidence of significant mesenteric thrombosis   severe hyperglycemia on insulin coverage    critical care polyneuropathy   Anemia of Acute blood Loss   severe protein caloric malnutrition   S/P Small bowel bleeding   S/P blood and FFP transfusion   Chronic kidney disease stage III  resume  NGT feeding .tolerating it well   psychiatric evaluation   GI prophylaxis with PPI

## 2021-08-22 NOTE — PROGRESS NOTE ADULT - SUBJECTIVE AND OBJECTIVE BOX
Chief complaint    Patient is a 66y old  Male who presents with a chief complaint of Anemia, Supratherapeutic INR, Dark Stools (22 Aug 2021 10:04)   Review of systems  Patient in bed, appears comfortable.    Labs and Fingersticks  CAPILLARY BLOOD GLUCOSE      POCT Blood Glucose.: 167 mg/dL (22 Aug 2021 05:33)  POCT Blood Glucose.: 170 mg/dL (22 Aug 2021 00:26)  POCT Blood Glucose.: 165 mg/dL (21 Aug 2021 17:46)  POCT Blood Glucose.: 165 mg/dL (21 Aug 2021 13:27)      Anion Gap, Serum: 14 (08-22 @ 00:56)  Anion Gap, Serum: 13 (08-21 @ 00:37)      Calcium, Total Serum: 10.0 (08-22 @ 00:56)  Calcium, Total Serum: 9.8 (08-21 @ 00:37)  Albumin, Serum: 3.2 *L* (08-22 @ 00:56)  Albumin, Serum: 3.3 (08-21 @ 00:37)    Alanine Aminotransferase (ALT/SGPT): 35 (08-22 @ 00:56)  Alanine Aminotransferase (ALT/SGPT): 34 (08-21 @ 00:37)  Alkaline Phosphatase, Serum: 209 *H* (08-22 @ 00:56)  Alkaline Phosphatase, Serum: 205 *H* (08-21 @ 00:37)  Aspartate Aminotransferase (AST/SGOT): 24 (08-22 @ 00:56)  Aspartate Aminotransferase (AST/SGOT): 20 (08-21 @ 00:37)        08-22    131<L>  |  92<L>  |  14  ----------------------------<  178<H>  4.3   |  25  |  0.54    Ca    10.0      22 Aug 2021 00:56  Phos  3.4     08-22  Mg     1.9     08-22    TPro  8.2  /  Alb  3.2<L>  /  TBili  0.4  /  DBili  x   /  AST  24  /  ALT  35  /  AlkPhos  209<H>  08-22                        8.9    13.76 )-----------( 298      ( 22 Aug 2021 00:56 )             28.3     Medications  MEDICATIONS  (STANDING):  cefepime   IVPB      cefepime   IVPB 1000 milliGRAM(s) IV Intermittent every 8 hours  chlorhexidine 2% Cloths 1 Application(s) Topical <User Schedule>  heparin   Injectable 5000 Unit(s) SubCutaneous every 8 hours  insulin lispro (ADMELOG) corrective regimen sliding scale   SubCutaneous every 6 hours  methimazole 10 milliGRAM(s) Oral once  metoclopramide Injectable 10 milliGRAM(s) IV Push every 8 hours  mirtazapine 7.5 milliGRAM(s) Oral daily  multivitamin 1 Tablet(s) Oral daily  pantoprazole  Injectable 40 milliGRAM(s) IV Push every 12 hours  propranolol 20 milliGRAM(s) Oral every 8 hours  sertraline 50 milliGRAM(s) Oral daily  thiamine 100 milliGRAM(s) Oral daily  vancomycin    Solution 125 milliGRAM(s) Oral every 6 hours      Physical Exam  General: Patient comfortable in bed  Vital Signs Last 12 Hrs  T(F): 98.1 (08-22-21 @ 08:00), Max: 98.5 (08-22-21 @ 04:00)  HR: 109 (08-22-21 @ 12:00) (101 - 115)  BP: --  BP(mean): --  RR: 20 (08-22-21 @ 12:00) (14 - 40)  SpO2: 98% (08-22-21 @ 12:00) (94% - 100%)  Neck: No palpable thyroid nodules.  CVS: S1S2, No murmurs  Respiratory: No wheezing, no crepitations  GI: Abdomen soft, bowel sounds positive  Musculoskeletal:  edema lower extremities.     Diagnostics    Free Thyroxine, Serum: AM Sched. Collection: 22-Aug-2021 06:00 (08-21 @ 10:21)  Free Thyroxine, Serum: AM Sched. Collection: 19-Aug-2021 06:00 (08-18 @ 11:22)  Thyroid Stimulating Hormone, Serum: AM Sched. Collection: 19-Aug-2021 06:00 (08-18 @ 11:22)  Free Thyroxine, Serum: AM Sched. Collection: 18-Aug-2021 06:00 (08-17 @ 22:44)  Free Thyroxine, Serum: AM Sched. Collection: 12-Aug-2021 06:00 (08-11 @ 10:29)

## 2021-08-22 NOTE — PROGRESS NOTE ADULT - SUBJECTIVE AND OBJECTIVE BOX
RICKY HAMPTON  MRN-[id number]  Patient is a 66y old  Male who presents with a chief complaint of Anemia, Supratherapeutic INR, Dark Stools (19 Aug 2021 20:47)    HPI:  65M PMH ACC/AHA stage D HF due to NICM HM2 LVAD , TV annuloplasty ring 17 as destination therapy due to severe peripheral artery disease with significant stenosis  SIADH, Depression, CKD-3 with hyperkalemia, past E. coli UTIs, driveline drainage (21) and COVID-19 (back in 2020)  He was recently seen in clinic where he complained of abdominal pain and dark stools w constipation back in May. He presents to [facility] ER today weakness and fatigue, moderate and + Black stools for three days, on coumadin secondary to warfarin use in the setting of an LVAD. Patient has required transfusions for GIB in the past. Mostly recently back in 2021 pt had anemia with dark stools. No interventions was done at that time. However Last Endoscopy was done in 2020 (negative). Today labs show patient is anemic with H/H of 4.5/16.3,. INR is 8.84 MAP in the 90s, Temp 35.1. He denies any chest pain, shortness of breath, dizziness, abd pain, nausea or vomiting.          Surgery/Hospital Course:   admit for melena w/ anemia, INR 8.84   6/15 Capsul study (+) for small bowel bleed, balloon endoscopy (old blood in prox ileum); post EGD - septic w/ L opacity, re-intubated for concern for aspiration, TTE (Mod MR, decrease biV w/ interventricular septum boweing towards R)   bronch    +C Diff    TC    CT C/A/P: Fluid filled colon which may be 2/2 rapid transit. Small bilateral pleffs with associates. Compressive atelectasis New ISABELLE & LLL  parenchymal opacities, suspicious for pneumonia. 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    #8 Ricky trach at bedside    CPAP trials    LVAD speed increased to 9200   Bronch; Central line dc'ed   TC since , continue as tolerated. Patient transferred to SDU.    Cont PT, no trach downsize today(#8cuffed)/ Anticoagulation/ Continue TF, speech f/u/ Vanco taper    oob to chair  INR  2.47  5 mg coumadin     increased lop to 25 q8  per HF   INR today 2.64.  H&H 7.3/24 this AM.  Will repeat CBC at noon, and will send stool guaiac Patient with persistent abdominal tenderness, rate of tube feeds decreased.  No nausea/vomiting.     INR today 2.4H&H 9.1.6 low flow overnight /N&V  NPO resolved for capsule study  Coumadin on hold refusing Tube feeds on D5  normal  @50 cc/hr   INR 2.69  H&H 7..1 refusing Tube feeds on D5 /2 normal  @50 cc/hr. This am + BM Melena Dr Oneill HF  aware- PRBC x1  GI team consulted -  NPO  plan on study in am-  D/w Dr Cadet Patient  to return to CTU for further management; 1PRBCS    Post op INR 2.2 today.  No bleeding. BC + for SM.  Pt is hypotensive requiring pressor and inotropic support.  ID follow up today on Cefepime will follow.   R PTC for PTX    CT C/A/P: sub q emphysema in R chest wall, GGO RUL, small ascites CTH negative; Abd US: GB thickening, pericholecystic fluid     Perchole drain in place continues to drain total output overnight 133.  Fever today 38.8 given tylenol.  Will repeat BC now.     duplex LE negative    Patient with persistent abdominal pain, refusing tube feeds and medications, Psych consulted   CTA A/P ordered to r/o mesenteric ischemia 2/2 persistent anorexia, nausea, vomiting. Revealed:  Evaluation of the mesenteric vessels is limited by streak artifact from LVAD. There appears to be severe stenosis of the proximal SMA; abdominal mesenteric doppler is recommended for further evaluation. 2.  No small bowel findings to suggest acute mesenteric ischemia. 3.  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  8/15: Cultures resulted BC 1/2 +GPC in clusters, SC enterobacter; mesenteric duplex: borderline stenosis of proximal SMA     Today: No acute events    REVIEW OF SYSTEMS:  Gen: Sitting in chair in NAD.  EYES/ENT: No visual changes;  No vertigo or throat pain   NECK: No pain   RES:  No shortness of breath or Cough  CARD: No chest pain   GI: No abdominal pain  : No dysuria  NEURO: No weakness  SKIN: No itching, rashes       ICU Vital Signs Last 24 Hrs  T(C): 36.7 (22 Aug 2021 08:00), Max: 36.9 (22 Aug 2021 04:00)  T(F): 98.1 (22 Aug 2021 08:00), Max: 98.5 (22 Aug 2021 04:00)  HR: 104 (22 Aug 2021 10:00) (101 - 126)  ABP: 89/62 (22 Aug 2021 10:00) (71/71 - 100/67)  ABP(mean): 73 (22 Aug 2021 10:00) (65 - 95)  RR: 26 (22 Aug 2021 10:00) (6 - 40)  SpO2: 94% (22 Aug 2021 10:00) (94% - 100%)          I&O's Detail    21 Aug 2021 07:01  -  22 Aug 2021 07:00  --------------------------------------------------------  IN:    Enteral Tube Flush: 125 mL    IV PiggyBack: 100 mL    Miscellaneous Tube Feedin mL  Total IN: 1260 mL    OUT:    Drain (mL): 200 mL    Voided (mL): 550 mL  Total OUT: 750 mL    Total NET: 510 mL      22 Aug 2021 07:01  -  22 Aug 2021 10:06  --------------------------------------------------------  IN:    Enteral Tube Flush: 30 mL    Miscellaneous Tube Feedin mL  Total IN: 165 mL    OUT:  Total OUT: 0 mL    Total NET: 165 mL              Physical Exam:  Gen:  Sitting in chair in NAD. Awake and alert, NAD  Psych: Withdrawn, depressed mood.  CNS:  intact, nonfocal, responds to all commands  Neck: no JVD, +TC   RES : course breath sounds, no wheezing              CVS: +LVAD hum   Abd: Soft, minimally-moderately tender in RUQ by perc dawn site, NT everywhere else, positive BS throughout. Perc dawn drain site c/d/i draining bilious fluid.  Skin: No rash  Ext:  no edema    ============================ LABS =========================                                    8.9    13.76 )-----------( 298      ( 22 Aug 2021 00:56 )             28.3         131<L>  |  92<L>  |  14  ----------------------------<  178<H>  4.3   |  25  |  0.54    Ca    10.0      22 Aug 2021 00:56  Phos  3.4       Mg     1.9         TPro  8.2  /  Alb  3.2<L>  /  TBili  0.4  /  DBili  x   /  AST  24  /  ALT  35  /  AlkPhos  209<H>        ABG - ( 22 Aug 2021 00:32 )  pH, Arterial: 7.41  pH, Blood: x     /  pCO2: 50    /  pO2: 146   / HCO3: 32    / Base Excess: 6.1   /  SaO2: 100.0       =====================Micro/Rad/Cardio=================  Culture: Reviewed   CXR: Reviewed  Echo:Reviewed    ======================================================  PAST MEDICAL & SURGICAL HISTORY:  CHF (congestive heart failure)    CAD (coronary artery disease)    Depression    Pleural effusion    History of 2019 novel coronavirus disease (COVID-19)  2020    Hemorrhoids    Bleeding hemorrhoids    Peripheral arterial disease    Claudication    BPH with urinary obstruction    ACC/AHA stage D systolic heart failure    Anticoagulation goal of INR 2.0 to 2.5    Falls    Clavicle fracture    CKD (chronic kidney disease), stage III    Iron deficiency anemia    H/O epistaxis    Vertigo    GI bleed    S/P TVR (tricuspid valve repair)    S/P ventricular assist device    S/P endoscopy      ====================ASSESSMENT ==============  Stage D Nonischemic Cardiomyopathy, Status Post HM2 on 2017    Cardiogenic shock  Hemodynamic instability   Acute hypoxemic respiratory failure  GI bleed , Status Post Enteroscopy   Anemia, in setting of melena   Chronic Kidney Disease  Stress hyperglycemia   C.diff positive on    Hypovolemic shock  Septic shock  Leukocytosis    Plan:  ====================== NEUROLOGY=====================  Nonfocal, continue to monitor neuro status   Tylenol PRN for analgesia   C/w mirtazapine for depression  Ambulate as tolerated     acetaminophen    Suspension .. 650 milliGRAM(s) Oral every 6 hours PRN Mild Pain (1 - 3)  mirtazapine 7.5 milliGRAM(s) Oral daily    ==================== RESPIRATORY======================  Acute hypoxemic respiratory failure s/p #8 shiley trach on ; patient put back on assist control on  2/2 worsening of clinical condition, continue TC as tolerated (TC since )  Continue close monitoring of respiratory rate and breathing pattern, following of ABG’s with A-line monitoring, continuous pulse oximetry monitoring.     Mechanical Ventilation:  Mode: off  FiO2: 40      ====================CARDIOVASCULAR==================  Stage D Nonischemic Cardiomyopathy, Status Post HM2 on 2017; LVAD settings 9200, flow 5.8   TTE : reveals at least moderate MR, mild AI, severe global LV syst dysfxn, dec RV fxn   Continue invasive hemodynamic monitoring   Propranolol for rate control of HR 2/2 Hyperthyroidism, titrate as tolerated    propranolol 20 milliGRAM(s) Oral every 8 hours    ===================HEMATOLOGIC/ONC ===================  Acute blood loss anemia, monitor H&H/Plts    Continue monitor LDH daily     VTE prophylaxis, heparin   Injectable 5000 Unit(s) SubCutaneous every 8 hours    ===================== RENAL =========================  Continue to monitor I/Os, BUN/Creatinine, and urine output.   Goal net negative fluid balance. Replete lytes PRN. Keep K> 4 and Mg >2.     ==================== GASTROINTESTINAL===================  S/p cholecystostomy tube placed on : with IR with -60cc of black bile, will monitor site closely.   Recent emesis on  in setting of abdominal pain, as per GI likely component of illeus given critical illness   CTA A/P : Evaluation of the mesenteric vessels is limited by streak artifact from LVAD. There appears to be severe stenosis of the proximal SMA; abdominal mesenteric doppler is recommended for further evaluation. 2.  No small bowel findings to suggest acute mesenteric ischemia. 3.  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  Vascular surgery and IR consulted for potential SMA stent. Both teams state that SMA stent is not warranted as they don't believe SMA stenosis is as severe on CTA as read by radiology and do not believe that this stenosis is causing patient's intolerance to TFs  Mesenteric duplex on 8/15 borderline stenosis of proximal SMA   TwoCal HN feeds, held overnight, plan to restart today   Zofran PRN for nausea   Reglan for gut motility   S&S will re-eval      multivitamin 1 Tablet(s) Oral daily  GI prophylaxis, pantoprazole  Injectable 40 milliGRAM(s) IV Push every 12 hours  thiamine 100 milliGRAM(s) Oral daily  ondansetron Injectable 4 milliGRAM(s) IV Push every 6 hours PRN Nausea and/or Vomiting  metoclopramide Injectable 10 milliGRAM(s) IV Push every 8 hours    =======================    ENDOCRINE  =====================  Stress hyperglycemia, continue glucose control with admelog sliding scale   Thyroid US to evaluate for thyroid nodule/goiter in setting of hyperthyroid  - contraindicated at this time 2/2 trach/will do when trach is out   C/w methimazole in setting of low TSH    insulin lispro (ADMELOG) corrective regimen sliding scale   SubCutaneous every 6 hours  methimazole 10 milliGRAM(s) Oral every 8 hours    ========================INFECTIOUS DISEASE================  Afebrile, WBC rising from 12.13 -> 15.41 -> 15.77  Monitor temperature and trend WBC.   BC 1/2 +GPC in clusters, SC enterobacter(CRE). C/w cefepime.  Vancomycin  PO for c.dif ppx      cefepime   IVPB 1000 milliGRAM(s) IV Intermittent every 8 hours  vancomycin    Solution 125 milliGRAM(s) Oral every 6 hours      Patient requires continuous monitoring with bedside rhythm monitoring, pulse ox monitoring, and intermittent blood gas analysis. Care plan discussed with ICU care team. Patient remained critical and at risk for life threatening decompensation.          RICKY HAMPTON  MRN-[id number]  Patient is a 66y old  Male who presents with a chief complaint of Anemia, Supratherapeutic INR, Dark Stools (19 Aug 2021 20:47)    HPI:  65M PMH ACC/AHA stage D HF due to NICM HM2 LVAD , TV annuloplasty ring 17 as destination therapy due to severe peripheral artery disease with significant stenosis  SIADH, Depression, CKD-3 with hyperkalemia, past E. coli UTIs, driveline drainage (21) and COVID-19 (back in 2020)  He was recently seen in clinic where he complained of abdominal pain and dark stools w constipation back in May. He presents to [facility] ER today weakness and fatigue, moderate and + Black stools for three days, on coumadin secondary to warfarin use in the setting of an LVAD. Patient has required transfusions for GIB in the past. Mostly recently back in 2021 pt had anemia with dark stools. No interventions was done at that time. However Last Endoscopy was done in 2020 (negative). Today labs show patient is anemic with H/H of 4.5/16.3,. INR is 8.84 MAP in the 90s, Temp 35.1. He denies any chest pain, shortness of breath, dizziness, abd pain, nausea or vomiting.          Surgery/Hospital Course:   admit for melena w/ anemia, INR 8.84   6/15 Capsul study (+) for small bowel bleed, balloon endoscopy (old blood in prox ileum); post EGD - septic w/ L opacity, re-intubated for concern for aspiration, TTE (Mod MR, decrease biV w/ interventricular septum boweing towards R)   bronch    +C Diff    TC    CT C/A/P: Fluid filled colon which may be 2/2 rapid transit. Small bilateral pleffs with associates. Compressive atelectasis New ISABELLE & LLL  parenchymal opacities, suspicious for pneumonia. 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    #8 Ricky trach at bedside    CPAP trials    LVAD speed increased to 9200   Bronch; Central line dc'ed   TC since , continue as tolerated. Patient transferred to SDU.    Cont PT, no trach downsize today(#8cuffed)/ Anticoagulation/ Continue TF, speech f/u/ Vanco taper    oob to chair  INR  2.47  5 mg coumadin     increased lop to 25 q8  per HF   INR today 2.64.  H&H 7.3/24 this AM.  Will repeat CBC at noon, and will send stool guaiac Patient with persistent abdominal tenderness, rate of tube feeds decreased.  No nausea/vomiting.     INR today 2.4H&H 9.1.6 low flow overnight /N&V  NPO resolved for capsule study  Coumadin on hold refusing Tube feeds on D5  normal  @50 cc/hr   INR 2.69  H&H 7..1 refusing Tube feeds on D5 /2 normal  @50 cc/hr. This am + BM Melena Dr Oneill HF  aware- PRBC x1  GI team consulted -  NPO  plan on study in am-  D/w Dr Cadet Patient  to return to CTU for further management; 1PRBCS    Post op INR 2.2 today.  No bleeding. BC + for SM.  Pt is hypotensive requiring pressor and inotropic support.  ID follow up today on Cefepime will follow.   R PTC for PTX    CT C/A/P: sub q emphysema in R chest wall, GGO RUL, small ascites CTH negative; Abd US: GB thickening, pericholecystic fluid     Perchole drain in place continues to drain total output overnight 133.  Fever today 38.8 given tylenol.  Will repeat BC now.     duplex LE negative    Patient with persistent abdominal pain, refusing tube feeds and medications, Psych consulted   CTA A/P ordered to r/o mesenteric ischemia 2/2 persistent anorexia, nausea, vomiting. Revealed:  Evaluation of the mesenteric vessels is limited by streak artifact from LVAD. There appears to be severe stenosis of the proximal SMA; abdominal mesenteric doppler is recommended for further evaluation. 2.  No small bowel findings to suggest acute mesenteric ischemia. 3.  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  8/15: Cultures resulted BC 1/2 +GPC in clusters, SC enterobacter; mesenteric duplex: borderline stenosis of proximal SMA     Today: No acute events    REVIEW OF SYSTEMS:  Gen: Sitting in chair in NAD.  EYES/ENT: No visual changes;  No vertigo or throat pain   NECK: No pain   RES:  No shortness of breath or Cough  CARD: No chest pain   GI: No abdominal pain  : No dysuria  NEURO: No weakness  SKIN: No itching, rashes       ICU Vital Signs Last 24 Hrs  T(C): 36.7 (22 Aug 2021 08:00), Max: 36.9 (22 Aug 2021 04:00)  T(F): 98.1 (22 Aug 2021 08:00), Max: 98.5 (22 Aug 2021 04:00)  HR: 104 (22 Aug 2021 10:00) (101 - 126)  ABP: 89/62 (22 Aug 2021 10:00) (71/71 - 100/67)  ABP(mean): 73 (22 Aug 2021 10:00) (65 - 95)  RR: 26 (22 Aug 2021 10:00) (6 - 40)  SpO2: 94% (22 Aug 2021 10:00) (94% - 100%)          I&O's Detail    21 Aug 2021 07:01  -  22 Aug 2021 07:00  --------------------------------------------------------  IN:    Enteral Tube Flush: 125 mL    IV PiggyBack: 100 mL    Miscellaneous Tube Feedin mL  Total IN: 1260 mL    OUT:    Drain (mL): 200 mL    Voided (mL): 550 mL  Total OUT: 750 mL    Total NET: 510 mL      22 Aug 2021 07:01  -  22 Aug 2021 10:06  --------------------------------------------------------  IN:    Enteral Tube Flush: 30 mL    Miscellaneous Tube Feedin mL  Total IN: 165 mL    OUT:  Total OUT: 0 mL    Total NET: 165 mL              Physical Exam:  Gen:  Sitting in chair in NAD. Awake and alert, NAD  Psych: Withdrawn, depressed mood.  CNS:  intact, nonfocal, responds to all commands  Neck: no JVD, +TC   RES : course breath sounds, no wheezing              CVS: +LVAD hum   Abd: Soft, minimally-moderately tender in RUQ by perc dawn site, NT everywhere else, positive BS throughout. Perc dawn drain site c/d/i draining bilious fluid.  Skin: No rash  Ext:  no edema    ============================ LABS =========================                                    8.9    13.76 )-----------( 298      ( 22 Aug 2021 00:56 )             28.3         131<L>  |  92<L>  |  14  ----------------------------<  178<H>  4.3   |  25  |  0.54    Ca    10.0      22 Aug 2021 00:56  Phos  3.4       Mg     1.9         TPro  8.2  /  Alb  3.2<L>  /  TBili  0.4  /  DBili  x   /  AST  24  /  ALT  35  /  AlkPhos  209<H>        ABG - ( 22 Aug 2021 00:32 )  pH, Arterial: 7.41  pH, Blood: x     /  pCO2: 50    /  pO2: 146   / HCO3: 32    / Base Excess: 6.1   /  SaO2: 100.0       =====================Micro/Rad/Cardio=================  Culture: Reviewed   CXR: Reviewed  Echo:Reviewed    ======================================================  PAST MEDICAL & SURGICAL HISTORY:  CHF (congestive heart failure)    CAD (coronary artery disease)    Depression    Pleural effusion    History of 2019 novel coronavirus disease (COVID-19)  2020    Hemorrhoids    Bleeding hemorrhoids    Peripheral arterial disease    Claudication    BPH with urinary obstruction    ACC/AHA stage D systolic heart failure    Anticoagulation goal of INR 2.0 to 2.5    Falls    Clavicle fracture    CKD (chronic kidney disease), stage III    Iron deficiency anemia    H/O epistaxis    Vertigo    GI bleed    S/P TVR (tricuspid valve repair)    S/P ventricular assist device    S/P endoscopy      ====================ASSESSMENT ==============  Stage D Nonischemic Cardiomyopathy, Status Post HM2 on 2017    Cardiogenic shock  Hemodynamic instability   Acute hypoxemic respiratory failure  GI bleed , Status Post Enteroscopy   Anemia, in setting of melena   Chronic Kidney Disease  Stress hyperglycemia   C.diff positive on    Hypovolemic shock  Septic shock  Leukocytosis    Plan:  ====================== NEUROLOGY=====================  Nonfocal, continue to monitor neuro status   Tylenol PRN for analgesia   C/w mirtazapine for depression. Zoloft added.  Ambulate as tolerated    acetaminophen    Suspension .. 650 milliGRAM(s) Oral every 6 hours PRN Mild Pain (1 - 3)  mirtazapine 7.5 milliGRAM(s) Oral daily    ==================== RESPIRATORY======================  Acute hypoxemic respiratory failure s/p #8 shiley trach on ; patient put back on assist control on  2/2 worsening of clinical condition, continue TC as tolerated (TC since )  Continue close monitoring of respiratory rate and breathing pattern, following of ABG’s with A-line monitoring, continuous pulse oximetry monitoring.     Mechanical Ventilation:  Mode: off  FiO2: 40      ====================CARDIOVASCULAR==================  Stage D Nonischemic Cardiomyopathy, Status Post HM2 on 2017; LVAD settings 9200, flow 5.8   TTE : reveals at least moderate MR, mild AI, severe global LV syst dysfxn, dec RV fxn   Continue invasive hemodynamic monitoring   Propranolol for rate control of HR 2/2 Hyperthyroidism, titrate as tolerated    propranolol 20 milliGRAM(s) Oral every 8 hours    ===================HEMATOLOGIC/ONC ===================  Acute blood loss anemia, monitor H&H/Plts    Continue monitor LDH daily     VTE prophylaxis, heparin   Injectable 5000 Unit(s) SubCutaneous every 8 hours    ===================== RENAL =========================  Continue to monitor I/Os, BUN/Creatinine, and urine output.   Goal net negative fluid balance. Replete lytes PRN. Keep K> 4 and Mg >2.     ==================== GASTROINTESTINAL===================  S/p cholecystostomy tube placed on : with IR with -60cc of black bile, will monitor site closely.   Recent emesis on  in setting of abdominal pain, as per GI likely component of illeus given critical illness   CTA A/P : Evaluation of the mesenteric vessels is limited by streak artifact from LVAD. There appears to be severe stenosis of the proximal SMA; abdominal mesenteric doppler is recommended for further evaluation. 2.  No small bowel findings to suggest acute mesenteric ischemia. 3.  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  Vascular surgery and IR consulted for potential SMA stent. Both teams state that SMA stent is not warranted as they don't believe SMA stenosis is as severe on CTA as read by radiology and do not believe that this stenosis is causing patient's intolerance to TFs  Mesenteric duplex on 8/15 borderline stenosis of proximal SMA   TwoCal HN feeds, held overnight, plan to restart today   Zofran PRN for nausea   Reglan for gut motility   S&S will re-eval      multivitamin 1 Tablet(s) Oral daily  GI prophylaxis, pantoprazole  Injectable 40 milliGRAM(s) IV Push every 12 hours  thiamine 100 milliGRAM(s) Oral daily  ondansetron Injectable 4 milliGRAM(s) IV Push every 6 hours PRN Nausea and/or Vomiting  metoclopramide Injectable 10 milliGRAM(s) IV Push every 8 hours    =======================    ENDOCRINE  =====================  Stress hyperglycemia, continue glucose control with admelog sliding scale   Thyroid US to evaluate for thyroid nodule/goiter in setting of hyperthyroid  - contraindicated at this time 2/2 trach/will do when trach is out   TSH persistently low, Free T4 low. Methimazole increased.    insulin lispro (ADMELOG) corrective regimen sliding scale   SubCutaneous every 6 hours  methimazole 10 milliGRAM(s) Oral every 8 hours    ========================INFECTIOUS DISEASE================  Afebrile, WBC downtrending  Monitor temperature and trend WBC.   BC 1/2 +GPC in clusters, SC enterobacter(CRE). C/w cefepime.  Vancomycin  PO for c.dif ppx      cefepime   IVPB 1000 milliGRAM(s) IV Intermittent every 8 hours  vancomycin    Solution 125 milliGRAM(s) Oral every 6 hours      Patient requires continuous monitoring with bedside rhythm monitoring, pulse ox monitoring, and intermittent blood gas analysis. Care plan discussed with ICU care team. Patient remained critical and at risk for life threatening decompensation.

## 2021-08-22 NOTE — PROGRESS NOTE ADULT - SUBJECTIVE AND OBJECTIVE BOX
RICKY JOINT  MRN#: [id number]  Subjective:  Pulmonary progress  : recurrent Acute hypoxic respiratory Failure ,aspiration pneumonia, NICM  , chart reviewed and H/O obtained radiological and Laboratory study reviewed patient Examined     65M PMH ACC/AHA stage D HF due to NICM HM2 LVAD , TV annuloplasty ring 17 as destination therapy due to severe peripheral artery disease with significant stenosis  SIADH, Depression, CKD-3 with hyperkalemia, past E. coli UTIs, driveline drainage (21) and COVID-19 (back in 2020)  He was recently seen in clinic where he complained of abdominal pain and dark stools w constipation back in May. He presents to [facility] ER today weakness and fatigue, moderate and + Black stools for three days, on coumadin secondary to warfarin use in the setting of an LVAD. Patient has required transfusions for GIB in the past. Mostly recently back in 2021 pt had anemia with dark stools. No interventions was done at that time. However Last Endoscopy was done in 2020 (negative). Today labs show patient is anemic with H/H of 4.5/16.3,. INR is 8.84 MAP in the 90s, Temp 35.1. He denies any chest pain, shortness of breath, dizziness, abd pain, nausea or vomiting. found to have  rectal bleeding underwent endoscopy ,old blood in the proximal ileum ,  develop sepsis with LL opacity given Antibiotics , Extubated , reintubated , Bronchoscopy on Zosyn for LL pneumonia  and Amiodarone S/P TV Annuloplasty , patient remain intubated on full ventilatory support .S/P multiple units of blood transfusion , remain on full ventilatory support on Precedex and propofol , new central IJ line , diarrhea C diff. +ve on po vancomycin and IV Flagyl,  mildly distended belly , fever start on cefepime 2gm q 8 hrs S/P tracheostomy .  new RT Subclavian central line continue on contact  isolation ,still diarrhea on C-diff antibiotics ENT follow up appreciated , trial of C-PAP as tolerated , , copious secretion from trach. site chest x ray left lower lobe pneumonia , tolerating trch. collar 50% FI02 still excessive secretion need pulmonary toilet , off Ancef antibiotic , no more diarrhea back on full support mechanical ventilator , chest x ray show improvement in LLL air space disease, more awake and responsive on tube feeding no more diarrhea , S/P  Ancef for bacteremia no fever ,ID follow up noted ,  no nausea or vomiting or diarrhea still very weak and tired , event note pulled NG tube now replaced , back on tube feeding ,still on po vancomycin , getting PT and OT at bed side , no plan for decannulation for now. , no more diarrhea receiving PT and OPT at bed side , minimal secretion from tracheostomy site , no SOB getting stronger , improve muscle tone patient transfer to monitor bed still on contact isolation for C-Difficel colitis on 50% FI02, NG tube clogged , and change to resume tube feeding still loose stool . H/H drop significantly require blood transfusion , most likely GI bleeding , IV heparin D/C , vomiting 200 cc of creamy color tube feeding on hold no sob, still melena monitor in the CTU possible capsule endoscopy , H/H is stable ., patient develop TR sided  pneumothorax require chest tube placement , RT IJ central line  placed , develop fever shaking chills , blood culture positive for serratia marcescens , start on cefepime .the patient  become hypoxic and hypotensive placed on full ventilatory support and Vasopressin , levophed and Dobutamine ,S/P blood transfusion on meropenem and vancomycin , IR note appreciated   , on and  off pressors , occasional agitation on Precedex .S/P IR cholecystostomy tube drainage placement in the RT upper Quadrant , resume anticoagulation chest x ray noted C-PAP trail lasted only for 2 hrs , new RT SC line and D/C RT IJ line , RT pig tail cathter has been removed , tolerating C-PAP trial placed on trach. collar 50% FI02 GI consultant noted on NG tube feeding as tolerated , develop AF RVR S/P  bolus Amiodarone  back to regular sinus Rhythm , Flat Affect depressed , back on tube feeding Vital AF at 60 cc/ hr .still intermittent abdominal pain , no fever saturation is accepted  back on full ventilatory support , tired and sleepy on round   Dark stool evaluated by Vascular service recommend mesenteric Duplex. new Rt IJ line , C-PAP trial  wean off ventilator  to trah.collar 50% FI02,  palliative care note appreciated . vascular mesenteric Dopplex noted no evidence of severe  mesenteric arteries thrombosis .start  back on  tube feeding . tolerating it very well , no nausea or vomiting , good 02 saturation on trac-collar        (2021 16:57)    PAST MEDICAL & SURGICAL HISTORY:  CHF (congestive heart failure)    CAD (coronary artery disease)  Depression    Pleural effusion    History of 2019 novel coronavirus disease (COVID-19)  2020    Hemorrhoids    Bleeding hemorrhoids    Peripheral arterial disease    Claudication    BPH with urinary obstruction    ACC/AHA stage D systolic heart failure  Anticoagulation goal of INR 2.0 to 2.5    Falls    Clavicle fracture    CKD (chronic kidney disease), stage III    Iron deficiency anemia    H/O epistaxis    Vertigo    GI bleed    S/P TVR (tricuspid valve repair)    S/P ventricular assist device    S/P endoscopy    OBJECTIVE:  ICU Vital Signs Last 24 Hrs  T(C): 38.2 (2021 10:00), Max: 38.5 (2021 12:00)  T(F): 100.8 (2021 10:00), Max: 101.3 (2021 12:00)  HR: 65 (2021 10:00) (61 - 69)  BP: --  BP(mean): --  ABP: 105/67 (2021 10:00) (90/54 - 113/64)  ABP(mean): 77 (2021 10:00) (63 - 77)  RR: 20 (2021 10:00) (19 - 35)  SpO2: 99% (2021 10:00) (96% - 100%)       @ 07:  -   @ 07:00  --------------------------------------------------------  IN: 2693.9 mL / OUT: 1415 mL / NET: 1278.9 mL     @ 07:01  -   @ 10:49  --------------------------------------------------------  IN: 420.8 mL / OUT: 115 mL / NET: 305.8 mL  PHYSICAL EXAM:Daily   Elderly male S/P tracheostomy on trach. collar 50% FI02 ,  Daily Weight in k.4 (2021 00:00)  HEENT:     + NCAT  + EOMI  - Conjuctival edema   - Icterus   - Thrush   - ETT  + NGT/OGT  Neck:         + FROM  RT IJ  line JVD  - Nodes - Masses + Mid-line trachea + Tracheostomy  Chest:            normal A-P diameter    Lungs:          + CTA   + Rhonchi    - Rales    - Wheezing + Decreased  LT BS   - Dullness R L  Cardiac:       + S1 + S2    + RRR   - Irregular   - S3  - S4    - Murmurs   - Rub   - Hamman’s sign   Abdomen:    + BS  + Soft + Non-tender - Distended - Organomegaly - PEG .cholecystostomy tube in place  Extremities:   - Cyanosis U/L   - Clubbing  U/L  + LE/UE Edema   + Capillary refill    + Pulses   Neuro:        - Awake   -  Alert   - Confused   - Lethargic   + Sedated  + Generalized Weakness  Skin:        - Rashes    - Erythema   + Normal incisions   + IV sites intact          HOSPITAL MEDICATIONS: All medications reviewed and analyzed  MEDICATIONS  (STANDING):  amiodarone    Tablet 200 milliGRAM(s) Oral daily  chlorhexidine 0.12% Liquid 15 milliLiter(s) Oral Mucosa every 12 hours  chlorhexidine 2% Cloths 1 Application(s) Topical <User Schedule>  dexmedetomidine Infusion 0.5 MICROgram(s)/kG/Hr (9.81 mL/Hr) IV Continuous <Continuous>  dextrose 50% Injectable 50 milliLiter(s) IV Push every 15 minutes  heparin  Infusion 400 Unit(s)/Hr (12.5 mL/Hr) IV Continuous <Continuous>  Hydromorphone  Injectable 0.5 milliGRAM(s) IV Push once  insulin lispro (ADMELOG) corrective regimen sliding scale   SubCutaneous every 6 hours  pantoprazole  Injectable 40 milliGRAM(s) IV Push every 12 hours  piperacillin/tazobactam IVPB.. 3.375 Gram(s) IV Intermittent every 8 hours  propofol Infusion 20 MICROgram(s)/kG/Min (9.42 mL/Hr) IV Continuous <Continuous>  sodium chloride 0.9% lock flush 3 milliLiter(s) IV Push every 8 hours  sodium chloride 0.9%. 1000 milliLiter(s) (10 mL/Hr) IV Continuous <Continuous>    MEDICATIONS  (PRN):  acetaminophen    Suspension .. 650 milliGRAM(s) Oral every 6 hours PRN Temp greater or equal to 38C (100.4F)    LABS: All Lab data reviewed and analyzed                        9.3    14.24 )-----------( 283      ( 21 Aug 2021 00:37 )             29.4        130<L>  |  92<L>  |  13  ----------------------------<  193<H>  4.2   |  25  |  0.58    Ca    9.8      21 Aug 2021 00:37  Phos  2.9       Mg     1.7         TPro  8.1  /  Alb  3.3  /  TBili  0.4  /  DBili  x   /  AST  20  /  ALT  34  /  AlkPhos  205<H>      Ca    10.2      20 Aug 2021 00:37  Phos  3.5       Mg     1.6         TPro  8.2  /  Alb  3.4  /  TBili  0.6  /  DBili  x   /  AST  22  /  ALT  37  /  AlkPhos  190<H>                                                                                                 PTT - ( 2021 04:52 )  PTT:45.2 sec LIVER FUNCTIONS - ( 2021 00:42 )  Alb: 3.4 g/dL / Pro: 6.7 g/dL / ALK PHOS: 213 U/L / ALT: 15 U/L / AST: 24 U/L / GGT: x           RADIOLOGY: - Reviewed and analyzed RT Pig tail cathter  , LVAD HM2, CT scan of abdomen reviewed result noted

## 2021-08-22 NOTE — PROGRESS NOTE ADULT - ASSESSMENT
Assessment  Hyperthyroidism: 66y Male with no history thyroid disease, was not on any thyroid supplements, previously euthyroid (June 2021), was on amiodarone (last dose 8/7), now in hyperthyroid state, Hashimotos, on Methimazole 10mg TID and propanolol for tachycardia. Methimazole restarted once LFTs improved, LFTs fluctuating, repeat TFTs, remains hyperthyroid, Palliative Care on board for goals of care.  Hyperglycemia: Patient in nondiabetic range, A1C 5.6%, now on insulin coverage, blood sugars in acceptable range, no hypoglycemias, on TFs.  CHF: on medications, stable, monitored.  Anemia: Monitored      Pavan Barone MD  Cell: [phone]  Office: [phone]

## 2021-08-23 NOTE — CHART NOTE - NSCHARTNOTEFT_GEN_A_CORE
Ethics continues to follow.  I met with Mr. Quispe today using Creole  [id number]. We got disconnected in the middle so conversation continued using  Fidel ID# [id number].    Mr. Quispe has resumed tube feeds the end of last week. He expressed he didn't want hem because they were bother his stomach, ad he still has intermittent stomach pain. He also expressed his wishes to go home, and wants to stop the tube feeds so he can go home. When asked what will happen if the tube feeds are stopped  and he can't eat by mouth he explained he will die. Ethics continues to follow.    I met with Mr. Quispe today using Creole  [id number]. We got disconnected in the middle so conversation continued using  Fidel ID# [id number].    Mr. Quispe has resumed tube feeds before the weekend. He expressed he didn't want them because they were bothering his stomach and caused him to vomit, and he still has intermittent stomach pain. He also expressed his wishes to go home, and wants to stop the tube feeds so he can go home. When asked what will happen if the tube feeds are stopped and he can't eat by mouth he explained he will die. I explained since he cannot eat by mouth, without tube feeds for nutrition he will get weaker and ultimately pass away, and that some people choose this option. He expressed that that's not it and he wouldn't want that. It was more the abdominal pain and vomiting that was driving his choice to hold the tube feeds. He was able to tell me he came to the hospital on June 14th, but was unclear of today's day or date. He knew he was in the hospital and said he came because of problems with his heart, and repeated his desire to go home. When asked where he lived before coming to the hospital he responded; you should know that better than me. And when asked where he would like to go after discharge he responded; that is my business. I mentioned the idea of going to [facility] and he was unclear what that was asking what's that?  Due to his trach it was difficult to understand everything he was saying, and it was challenging to get him to fully engage in the conversation at times, so while his capacity does seem limited it was difficult to fully assess and it should be re-evaluated on an ongoing basis.    I spoke with palliative care who explained there are no palliative care needs now that he has resumed tube feeds however, further conversations regarding overall goals of care and decision making capacity should be had.    I spoke with behavioral health as well and clarified the patient's mood may be a factor in his decision making however, it was difficult at the time of their conversation to fully assess. And we discussed his capacity for decision making continues to remain in question, and may be limited, and therefore should be re-evaluated on an ongoing basis.    Ethics continues to follow and recommends continued conversations with patient regarding overall plan of care including long term nutrition option i.e PEG and discharge planning [facility] versus home with services and support. And capacity may be limited and should be reassessed on an ongoing basis and for each decision at hand.     Ethics will continue to follow and be available as needed.

## 2021-08-23 NOTE — PROGRESS NOTE ADULT - PROBLEM SELECTOR PLAN 1
- Continue home amiodarone 200 mg PO daily  - hemodynamically stable off   - titrate Nicardipine infusion, target MAP 70-80s, now improved - hemodynamically stable, warm and well perfused. Euvolemic  -will aim to restart GDMT once he is out of ICU and infection is treated

## 2021-08-23 NOTE — CONSULT NOTE ADULT - SUBJECTIVE AND OBJECTIVE BOX
The patient is a 31y Female complaining of Consult requested by: Dr. LISA Marte   Service: CT surgery        Primary MD: JAMES Cadet (Cardiology)  OTHER MD: LISA Banegas MD, Palliative Care  Consultant: Adina Schwab (Ethics Fellow) and Matt Duffy (Ethicist) [phone] or (O) [phone]  Consult purpose:  Assist the team in the ethical dilemma posed by a 66 year old male with complex medical history and prolonged hospitalization (>2 months) who is now refusing nutrition.     Clinical summary: This is a 66 year old male with a PMH ACC/AHA stage D systolic HF due to non-ischemic cardiomyopathy, s/p HM2 LVAD, tricuspid valve annuloplasty ring 9/12/17 as destination therapy due to severe peripheral artery disease with significant stenosis, bleeding hemorrhoids, BPH with urinary obstruction, coronary artery disease, claudication, clavicle fracture, falls, GI bleed, epistaxis, iron deficiency anemia, pleural effusion, vertigo, SIADH, Depression, CKD-3 with hyperkalemia, past E. coli UTIs, driveline drainage (1/7/21) and COVID-19 (April 2020). He presented on 6/14/21 to [facility] ER for weakness and fatigue, positive black stools for three days, on coumadin in the setting of an LVAD. Patient has required transfusions for GIB in the past. Mostly recently in Jan 2021 patient had anemia with dark stools, not requiring intervention. Last Endoscopy was done in July 2020 (negative). Enteroscopy on admission positive for old small bowel bleed but no active bleeding identified. Procedure complicated by aspiration pneumonia, and patient was re-intubated due to concern for aspiration. Follow up imaging with concern for recurrent/persistent pneumonia. Pt had prolonged intubation and is now status post tracheostomy. Completed multiple courses of antibiotics. Status post multiple units of PRBC’s (packed red blood cells) due to anemia likely due to GI bleeding. Anticoagulation held. Positive for C Diff and completed antibiotics. Has NGT which patient pulled out and was replaced, and then subsequently became clogged and needed to be changed. Late July developed persistent abdominal tenderness and refused tube feeds. He developed right pneumothorax and had right chest tube placed. Resumed tube feeding but still with intermittent abdominal pain. Course also complicated by bacteremia secondary to acalculous cholecystitis requiring cholecystostomy tube placement and antibiotics. Developed abdominal pain and vomiting, As per GI, most likely secondary to ileus given acute illness. Patient refusing tube feeds again. CTA done to eval for mesenteric ischemia and showed possible severe stenosis of proximal SMA but no small bowel findings to suggest acute mesenteric ischemia. Vascular surgery and IR consulted for potential SMA stent. Both teams state that SMA stent is not warranted as they don't believe SMA stenosis is as severe on CTA as read by radiology and do not believe that this stenosis is causing patient's intolerance to tube feeds. Mesenteric duplex showed borderline stenosis of proximal SMA. Rheumatology consulted to eval for vasculitis. Overall course complicated by recurrent infections and sepsis, cardiogenic shock, hemodynamic instability, and acute hypoxemic respiratory failure. As per Palliative Care on 8/18/21, recommendation for re-evaluation by Behavioral Health to assess capacity for medical decision- making regarding nutrition and if he does NOT have capacity, then family needs to be involved. Palliative Care also noted ethical concerns surrounding this case. Heart Failure/Transplant, Pulmonary, Endocrine, Behavioral Health, GI, ENT, ID, IR, Vascular Surgery, SLP and Palliative Care on consult. Ethics consult initiated for a patient who is refusing necessary nutrition and whose capacity is in question.    Prognosis Estimate (survival in hrs, days, wks, mos, yrs): poor  Patient Decision-Making Capacity:  Has capacity Lacks capacity ASSESSMENT IN PROCESS  Patient Aware of:  Diagnosis:   Yes    No   Unknown    Prognosis:   Yes    No   Unknown       Name of medical decision-maker should patient lack capacity: Cristina Rduolph, [phone]  Other stakeholders: Celestina, Niece; Miller, Nephew   Other Decision-Maker (i.e., HCA or Surrogate) Aware of:  Diagnosis: Yes   No  Unknown      Prognosis:   Yes    No    Unknown   Evidence of Patient’s Preference of Life-Sustaining Treatment (Written or Oral): none  Resuscitation status:   DNR:  Yes   No      DNI: Yes   No       DISCUSSIONS:  Discussion between MATILDE Green MD (Ethics Attending) and LISA Banegas MD (Palliative Care Attending) on 8/18/21: Case discussed. Patient need capacity assessment by Behavioral Health and based on the results of the assessment need further conversation with patient regarding refusal of nutrition or with his HCP.   Discussion with Matt Duffy (Ethicist), Adina Schwab (Ethics Fellow) and patient on 8/19/21: Met with patient at bedside and his mood was down and he was not so willing to fully participate in the conversation. He did say his stomach hurt and that was part of his decision to not want tube feeds, he did say he would consider restarting them in the near future, and that he wanted to get out of the hospital. He was eager to get up and do PT and was not interested in pursuing further conversation or clarification. I returned after PT when patient was sitting in his chair to continue the conversation however he was not in the mood of talking further at that time.  Discussion with Matt Duffy (Ethicist), Adina Schwab (Ethics Fellow) and Critical Care CLAUDIA Erickson on 8/19/21: Patient has had a complicated and long hospital course and now is refusing tube feeds. It is unknown why. Psychiatry was consulted but has not seen him yet.  Discussion with Adina Schwab (Ethics Fellow) and patient on 8/23/21: Used IntY  [id number]. We got disconnected in the middle so conversation continued using  Disrupt6 ID# [id number]. Mr. Quispe has resumed tube feeds before the weekend. He expressed he didn't want them because they were bothering his stomach and caused him to vomit, and he still has intermittent stomach pain. He also expressed his wishes to go home, and wants to stop the tube feeds so he can go home. When asked what will happen if the tube feeds are stopped and he can't eat by mouth he explained he will die. I explained since he cannot eat by mouth, without tube feeds for nutrition he will get weaker and ultimately pass away, and that some people choose this option. He expressed that that's not it and he wouldn't want that. It was more the abdominal pain and vomiting that was driving his choice to hold the tube feeds. He was able to tell me he came to the hospital on June 14th, but was unclear of today's day or date. He knew he was in the hospital and said he came because of problems with his heart, and repeated his desire to go home. When asked where he lived before coming to the hospital he responded; you should know that better than me. And when asked where he would like to go after discharge he responded; that is my business. I mentioned the idea of going to [facility] and he was unclear what that was asking what's that? Due to his tracheostomy it was difficult to understand everything he was saying, and it was challenging to get him to fully engage in the conversation at times, so while his capacity does seem limited it was difficult to fully assess and it should be re-evaluated on an ongoing basis.  Discussion between A. Schwab (Ethics Fellow) and LISA Banegas MD (Palliative Care Attending) on 8/23/21: At present, no palliative care needs now that he has resumed tube feeds however, further conversations regarding overall goals of care and decision-making capacity should be had.  Discussion between A. Schwab (Ethics Fellow) and MIGUEL Marin DO (Behavioral Health Attending) on 8/23/21: Clarified the patient's mood may be a factor in his decision making however, it was difficult at the time of their conversation to fully assess. And we discussed his capacity for decision making continues to remain in question, and may be limited, and therefore should be re-evaluated on an ongoing basis.    Bioethics analysis:   The central ethical issue that exists in this case is (A) the patient’s autonomy versus (B) the providers’ desire for beneficence and non-maleficence. The ethical analysis faces the substantial difficulty of balancing 3 bioethical operational principles in "managing" the patient’s nutritional status: (1) Beneficence, which leads his physicians to recommend enteral nutrition to optimize patient’s clinical status; (2) Respect for Autonomy, the patient’s own right to self-determination in matters related to the integrity of his body and self; and (3) Non-Maleficence, the physician’s duty to "do no harm" by an intervention that has distinct risks, including coercing this patient to feed against his wishes, but without any nutrition, the patient would face certain mortality.    The bioethical principle of beneficence promotes the best possible health or quality of living or dying: with aggressive interventions when these help to prolong life with "good quality," and with comfort care when cure and remission are not possible and the aim is to achieve the best "quality of dying." The clinician’s duty to non-maleficence means avoiding medical interventions whose risk and burden exceeds their benefit ("first do not harm" or primum non nocere). In this case, proportionality (the assessment of benefits versus risks i.e. beneficence vs. nonmaleficence) leads the patient’s physicians to recommend continued supplemental parenteral/enteral nutrition. This decision must be balanced with the principles of Autonomy.    The principle of respect for autonomous persons acknowledges a patient’s right to hold views, make choices, and take actions based on their values and beliefs(i). Furthermore, this principle recognizes the patient’s dignity and bodily integrity(i). Essential to this principle is the capacity for autonomous choice(i). In other words, the patient’s ability to decide what can and cannot be done to him according to his set of values.    In this case, the patient’s autonomy is best preserved through informed consent by the autonomous patient with capacity. In the [place], adults with decision-making capacity (i.e., the ability to understand the consequences of their decisions) have the right to refuse medical therapy, even if the therapy is thought to be life-saving.(ii)  A medical team may challenge a refusal only if they think that the patient does not understand the therapy, alternatives, or consequences of refusal or is otherwise impaired (eg, suicidal or depressed). In regard to those with mental health capacities, an individual may have the capacity to understand and give informed consent to some, but not all, health care decisions. A diagnosis of depression does not automatically render a person incapable of giving informed consent. Individuals with mental illness should be afforded the opportunity to make their own health care decisions if they have the capacity to do so. In this case, Mr. Quispe’s capacity remains in question as his participation in the assessment process has been limited. Behavioral Health has noted his diagnosis as adjustment disorder in the setting of complex medical illness as well as delirium.  Of note, capacity is decision specific, and it can wax and wane over time depending on factors such as mood, medical condition, nutritional status, and delirium. Therefore, practitioners should continuously optimize a patient’s capacity, to the extent possible return them to a baseline capacity, to help them make medical decisions for themselves. They should be allowed to make their own health care decisions if they can do so.   In this case, mood related to complex medical illness along with abdominal pain seems to be the reason Mr. Quispe was refusing feeds. Therefore, determining the etiology of his abdominal pain as well as optimizing management of his abdominal pain would be prudent in this situation. Optimization of mood should be addressed by Behavioral Health. The patient has a dutiful Health Care Proxy, his sister Alyssa, to assist in decision-making as needed.    Mr. Quispe’s innate moral status leads the health care team to show respect for him as a person with dignity. This moral status requires careful consideration of his chance for recovery and a life he may come to enjoy. Therefore, the ethical analysis must consider whether it is ethically reasonable to coerce or utilize surrogate decision making in a patient who articulated the refusal of nutritional intervention OR is it ethically permissible to forego enteral feeding if he meets criteria of [place] to withhold or withdraw life sustaining treatments? In this case, Mr. Quispe has resumed NG tube feedings and has stated that he did not want to stop medical interventions. He stated he was not ready to die.   Communication is central in the practitioner-patient relationship(iii) This is not only owed to patients. It also extends to a patient’s health care proxy when a patient loses capacity. Sometimes patients can create several concerns for the medical team. However, patients are sometimes thrown to a difficult situation and can bring their fears and anxieties to an already delicate situation. Understanding the root of the problem can help diffuse these difficult moments. Practical tools such as extensive communication with the patient and active listening can help in this regard.

## 2021-08-23 NOTE — CHART NOTE - NSCHARTNOTEFT_GEN_A_CORE
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 coli UTIs, driveline drainage (1/7/21) and COVID-19 (back in April 2020) admitted initially for GIB with complicated and prolonged hospitalization including intermittent GIB (AC has been discontinued for reccurent bleeding), aspiration with prolonged intubation s/p tracheostomy, c. diff + (s/p PO vancomycin course), serratia bacteremia and sepsis secondary to acalculous cholecystitis s/p percutaneous cholecystostomy tube placement, s/p trach. 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 This CT demonstrated celiac, SMA, and proximal great vessels with soft tissue infiltration with proximal SMA stenosis and no obvious calcification, these findings are same as a CTA C/A/P that was performed in 7/2020. less likely that a large-to-medium vessel vasculitis is the etiology given stability for >1 year without treatment.       Plan:  -Workup essentially negative  -IgG4 levels likely a reactive process as the total IgG and other IgG subset are also elevated.  -No further evaluation for large vessel vasculitis required      Layo Greene  Rheum Fellow   Pager: [phone]  D/w Dr. Cartagena

## 2021-08-23 NOTE — PROGRESS NOTE ADULT - ATTENDING COMMENTS
67 y/o stage D NICM s/p HM2 on 9/2017 as DT (due to severe PAD) with TV ring, prior COVID-19 infection 4/2020, recurrent syncope post LVAD s/p ILR, and chronic abdominal pain admitted on 6/14 with symptomatic anemia in setting of INR 8.8 without hemodynamic instability. Workup showed active bleeding in the mid small bowel with negative enteroscopy. Hospital course c/b respiratory failure with fever/hypertension, low flow alarms, and pulmonary infiltrate with hypoxia requiring intubation from probable aspiration PNA. Underwent tracheostomy for inability to wean. Had recurrent GI bleed as well as Serratia bacteremia s/p Cholecystostomy tube by IR. Sputum culture from 8/13 positive for Enterobacter for which he is being treated with IV Meropenem and IV Vancomycin with downtrending leukocytosis. Had abdominal pain from unclear etiology (? SMA stenosis) but is now tolerating TFs. Appears euvolemic and VAD without any events.   - continue with meds as above  - culture given secretions  - once secretions resolve, would consider decannulation and speech/swallow study  - palliative care input appreciated

## 2021-08-23 NOTE — PROGRESS NOTE ADULT - SUBJECTIVE AND OBJECTIVE BOX
RICKY JOINT  MRN#: [id number]  Subjective:  Pulmonary progress  : recurrent Acute hypoxic respiratory Failure ,aspiration pneumonia, NICM  , chart reviewed and H/O obtained radiological and Laboratory study reviewed patient Examined     65M PMH ACC/AHA stage D HF due to NICM HM2 LVAD , TV annuloplasty ring 17 as destination therapy due to severe peripheral artery disease with significant stenosis  SIADH, Depression, CKD-3 with hyperkalemia, past E. coli UTIs, driveline drainage (21) and COVID-19 (back in 2020)  He was recently seen in clinic where he complained of abdominal pain and dark stools w constipation back in May. He presents to [facility] ER today weakness and fatigue, moderate and + Black stools for three days, on coumadin secondary to warfarin use in the setting of an LVAD. Patient has required transfusions for GIB in the past. Mostly recently back in 2021 pt had anemia with dark stools. No interventions was done at that time. However Last Endoscopy was done in 2020 (negative). Today labs show patient is anemic with H/H of 4.5/16.3,. INR is 8.84 MAP in the 90s, Temp 35.1. He denies any chest pain, shortness of breath, dizziness, abd pain, nausea or vomiting. found to have  rectal bleeding underwent endoscopy ,old blood in the proximal ileum ,  develop sepsis with LL opacity given Antibiotics , Extubated , reintubated , Bronchoscopy on Zosyn for LL pneumonia  and Amiodarone S/P TV Annuloplasty , patient remain intubated on full ventilatory support .S/P multiple units of blood transfusion , remain on full ventilatory support on Precedex and propofol , new central IJ line , diarrhea C diff. +ve on po vancomycin and IV Flagyl,  mildly distended belly , fever start on cefepime 2gm q 8 hrs S/P tracheostomy .  new RT Subclavian central line continue on contact  isolation ,still diarrhea on C-diff antibiotics ENT follow up appreciated , trial of C-PAP as tolerated , , copious secretion from trach. site chest x ray left lower lobe pneumonia , tolerating trch. collar 50% FI02 still excessive secretion need pulmonary toilet , off Ancef antibiotic , no more diarrhea back on full support mechanical ventilator , chest x ray show improvement in LLL air space disease, more awake and responsive on tube feeding no more diarrhea , S/P  Ancef for bacteremia no fever ,ID follow up noted ,  no nausea or vomiting or diarrhea still very weak and tired , event note pulled NG tube now replaced , back on tube feeding ,still on po vancomycin , getting PT and OT at bed side , no plan for decannulation for now. , no more diarrhea receiving PT and OPT at bed side , minimal secretion from tracheostomy site , no SOB getting stronger , improve muscle tone patient transfer to monitor bed still on contact isolation for C-Difficel colitis on 50% FI02, NG tube clogged , and change to resume tube feeding still loose stool . H/H drop significantly require blood transfusion , most likely GI bleeding , IV heparin D/C , vomiting 200 cc of creamy color tube feeding on hold no sob, still melena monitor in the CTU possible capsule endoscopy , H/H is stable ., patient develop TR sided  pneumothorax require chest tube placement , RT IJ central line  placed , develop fever shaking chills , blood culture positive for serratia marcescens , start on cefepime .the patient  become hypoxic and hypotensive placed on full ventilatory support and Vasopressin , levophed and Dobutamine ,S/P blood transfusion on meropenem and vancomycin , IR note appreciated   , on and  off pressors , occasional agitation on Precedex .S/P IR cholecystostomy tube drainage placement in the RT upper Quadrant , resume anticoagulation chest x ray noted C-PAP trail lasted only for 2 hrs , new RT SC line and D/C RT IJ line , RT pig tail cathter has been removed , tolerating C-PAP trial placed on trach. collar 50% FI02 GI consultant noted on NG tube feeding as tolerated , develop AF RVR S/P  bolus Amiodarone  back to regular sinus Rhythm , Flat Affect depressed , back on tube feeding Vital AF at 60 cc/ hr .still intermittent abdominal pain , no fever saturation is accepted  back on full ventilatory support , tired and sleepy on round   Dark stool evaluated by Vascular service recommend mesenteric Duplex. new Rt IJ line , C-PAP trial  wean off ventilator  to trah.collar 50% FI02,  palliative care note appreciated . vascular mesenteric Dopplex noted no evidence of severe  mesenteric arteries thrombosis .start  back on  tube feeding . tolerating it very well , no nausea or vomiting , good 02 saturation on trac-collar on methimazole for hyperthyroidism        (2021 16:57)    PAST MEDICAL & SURGICAL HISTORY:  CHF (congestive heart failure)    CAD (coronary artery disease)  Depression    Pleural effusion    History of 2019 novel coronavirus disease (COVID-19)  2020    Hemorrhoids    Bleeding hemorrhoids    Peripheral arterial disease    Claudication    BPH with urinary obstruction    ACC/AHA stage D systolic heart failure  Anticoagulation goal of INR 2.0 to 2.5    Falls    Clavicle fracture    CKD (chronic kidney disease), stage III    Iron deficiency anemia    H/O epistaxis    Vertigo    GI bleed    S/P TVR (tricuspid valve repair)    S/P ventricular assist device    S/P endoscopy    OBJECTIVE:  ICU Vital Signs Last 24 Hrs  T(C): 38.2 (2021 10:00), Max: 38.5 (2021 12:00)  T(F): 100.8 (2021 10:00), Max: 101.3 (2021 12:00)  HR: 65 (2021 10:00) (61 - 69)  BP: --  BP(mean): --  ABP: 105/67 (2021 10:00) (90/54 - 113/64)  ABP(mean): 77 (2021 10:00) (63 - 77)  RR: 20 (2021 10:00) (19 - 35)  SpO2: 99% (2021 10:00) (96% - 100%)       @ 07:  -   @ 07:00  --------------------------------------------------------  IN: 2693.9 mL / OUT: 1415 mL / NET: 1278.9 mL     @ 07: @ 10:49  --------------------------------------------------------  IN: 420.8 mL / OUT: 115 mL / NET: 305.8 mL  PHYSICAL EXAM:Daily   Elderly male S/P tracheostomy on trach. collar 50% FI02 ,  Daily Weight in k.4 (2021 00:00)  HEENT:     + NCAT  + EOMI  - Conjuctival edema   - Icterus   - Thrush   - ETT  + NGT/OGT  Neck:         + FROM  RT IJ  line JVD  - Nodes - Masses + Mid-line trachea + Tracheostomy  Chest:            normal A-P diameter    Lungs:          + CTA   + Rhonchi    - Rales    - Wheezing + Decreased  LT BS   - Dullness R L  Cardiac:       + S1 + S2    + RRR   - Irregular   - S3  - S4    - Murmurs   - Rub   - Hamman’s sign   Abdomen:    + BS  + Soft + Non-tender - Distended - Organomegaly - PEG .cholecystostomy tube in place  Extremities:   - Cyanosis U/L   - Clubbing  U/L  + LE/UE Edema   + Capillary refill    + Pulses   Neuro:        - Awake   -  Alert   - Confused   - Lethargic   + Sedated  + Generalized Weakness  Skin:        - Rashes    - Erythema   + Normal incisions   + IV sites intact          HOSPITAL MEDICATIONS: All medications reviewed and analyzed  MEDICATIONS  (STANDING):  amiodarone    Tablet 200 milliGRAM(s) Oral daily  chlorhexidine 0.12% Liquid 15 milliLiter(s) Oral Mucosa every 12 hours  chlorhexidine 2% Cloths 1 Application(s) Topical <User Schedule>  dexmedetomidine Infusion 0.5 MICROgram(s)/kG/Hr (9.81 mL/Hr) IV Continuous <Continuous>  dextrose 50% Injectable 50 milliLiter(s) IV Push every 15 minutes  heparin  Infusion 400 Unit(s)/Hr (12.5 mL/Hr) IV Continuous <Continuous>  Hydromorphone  Injectable 0.5 milliGRAM(s) IV Push once  insulin lispro (ADMELOG) corrective regimen sliding scale   SubCutaneous every 6 hours  pantoprazole  Injectable 40 milliGRAM(s) IV Push every 12 hours  piperacillin/tazobactam IVPB.. 3.375 Gram(s) IV Intermittent every 8 hours  propofol Infusion 20 MICROgram(s)/kG/Min (9.42 mL/Hr) IV Continuous <Continuous>  sodium chloride 0.9% lock flush 3 milliLiter(s) IV Push every 8 hours  sodium chloride 0.9%. 1000 milliLiter(s) (10 mL/Hr) IV Continuous <Continuous>    MEDICATIONS  (PRN):  acetaminophen    Suspension .. 650 milliGRAM(s) Oral every 6 hours PRN Temp greater or equal to 38C (100.4F)    LABS: All Lab data reviewed and analyzed                        8.7    15.65 )-----------( 284      ( 23 Aug 2021 00:37 )             27.9   08-    130<L>  |  93<L>  |  15  ----------------------------<  172<H>  4.5   |  26  |  0.47<L>    Ca    9.8      23 Aug 2021 00:36  Phos  3.2     08-  Mg     1.8         TPro  8.4<H>  /  Alb  3.3  /  TBili  0.3  /  DBili  x   /  AST  26  /  ALT  35  /  AlkPhos  217<H>        Ca    9.8      21 Aug 2021 00:37  Phos  2.9     08-  Mg     1.7         TPro  8.1  /  Alb  3.3  /  TBili  0.4  /  DBili  x   /  AST  20  /  ALT  34  /  AlkPhos  205<H>      Ca    10.2      20 Aug 2021 00:37  Phos  3.5     08-20  Mg     1.6         TPro  8.2  /  Alb  3.4  /  TBili  0.6  /  DBili  x   /  AST  22  /  ALT  37  /  AlkPhos  190<H>                                                                                                 PTT - ( 2021 04:52 )  PTT:45.2 sec LIVER FUNCTIONS - ( 2021 00:42 )  Alb: 3.4 g/dL / Pro: 6.7 g/dL / ALK PHOS: 213 U/L / ALT: 15 U/L / AST: 24 U/L / GGT: x           RADIOLOGY: - Reviewed and analyzed RT Pig tail cathter  , LVAD HM2, CT scan of abdomen reviewed result noted

## 2021-08-23 NOTE — PROGRESS NOTE ADULT - SUBJECTIVE AND OBJECTIVE BOX
INFECTIOUS DISEASES FOLLOW UP-- Anitra Prater  [phone]    This is a follow up note for this  66yMale with  Anemia    Acute cholecystitis        ROS:  CONSTITUTIONAL:  No fever, good appetite  CARDIOVASCULAR:  No chest pain or palpitations  RESPIRATORY:  No dyspnea  GASTROINTESTINAL:  No nausea, vomiting, diarrhea, or abdominal pain  GENITOURINARY:  No dysuria  NEUROLOGIC:  No headache,     Allergies    No Known Allergies    Intolerances        ANTIBIOTICS/RELEVANT:  antimicrobials  cefepime   IVPB      cefepime   IVPB 1000 milliGRAM(s) IV Intermittent every 8 hours  vancomycin    Solution 125 milliGRAM(s) Oral every 6 hours    immunologic:    OTHER:  acetaminophen    Suspension .. 650 milliGRAM(s) Oral every 6 hours PRN  chlorhexidine 2% Cloths 1 Application(s) Topical <User Schedule>  heparin   Injectable 5000 Unit(s) SubCutaneous every 8 hours  insulin lispro (ADMELOG) corrective regimen sliding scale   SubCutaneous every 6 hours  methimazole 20 milliGRAM(s) Oral daily  metoclopramide Injectable 10 milliGRAM(s) IV Push every 8 hours  mirtazapine 7.5 milliGRAM(s) Oral daily  multivitamin 1 Tablet(s) Oral daily  ondansetron Injectable 4 milliGRAM(s) IV Push every 6 hours PRN  pantoprazole  Injectable 40 milliGRAM(s) IV Push every 12 hours  propranolol 20 milliGRAM(s) Oral every 8 hours  sertraline 50 milliGRAM(s) Oral daily  thiamine 100 milliGRAM(s) Oral daily      Objective:  Vital Signs Last 24 Hrs  T(C): 37.7 (23 Aug 2021 16:00), Max: 37.7 (23 Aug 2021 16:00)  T(F): 99.9 (23 Aug 2021 16:00), Max: 99.9 (23 Aug 2021 16:00)  HR: 114 (23 Aug 2021 17:00) (95 - 125)  BP: --  BP(mean): --  RR: 20 (23 Aug 2021 17:00) (15 - 44)  SpO2: 100% (23 Aug 2021 17:00) (96% - 100%)    PHYSICAL EXAM:  Constitutional:no acute distress  Eyes:ALONSO, EOMI  Ear/Nose/Throat: no oral lesions, 	  Respiratory: clear BL  Cardiovascular: S1S2  Gastrointestinal:soft, (+) BS, no tenderness  Extremities:no e/e/c  No Lymphadenopathy  IV sites not inflammed.    LABS:                        8.7    15.65 )-----------( 284      ( 23 Aug 2021 00:37 )             27.9     08-23    130<L>  |  93<L>  |  15  ----------------------------<  172<H>  4.5   |  26  |  0.47<L>    Ca    9.8      23 Aug 2021 00:36  Phos  3.2     08-23  Mg     1.8     08-23    TPro  8.4<H>  /  Alb  3.3  /  TBili  0.3  /  DBili  x   /  AST  26  /  ALT  35  /  AlkPhos  217<H>  08-23    PT/INR - ( 23 Aug 2021 00:37 )   PT: 13.6 sec;   INR: 1.14 ratio               MICROBIOLOGY:            RECENT CULTURES:  08-20 @ 23:18  .Blood Blood-Peripheral  --  --  --    No growth to date.  --      RADIOLOGY & ADDITIONAL STUDIES:    < from: Xray Chest 1 View- PORTABLE-Routine (Xray Chest 1 View- PORTABLE-Routine in AM.) (08.23.21 @ 02:40) >  IMPRESSION:  Elevated right hemidiaphragm. No focal consolidations, pneumothorax, or pleural effusions.    Postoperative changes as above.    Tracheostomy tube and enteric tube in satisfactory position.    < end of copied text >   INFECTIOUS DISEASES FOLLOW UP-- Anitra Prater  [phone]    This is a follow up note for this  66yMale with  Anemia    Acute cholecystitis        ROS:  CONSTITUTIONAL:  No fever,   CARDIOVASCULAR:  No chest pain or palpitations  RESPIRATORY:  No dyspnea  GASTROINTESTINAL:  No nausea, vomiting, diarrhea, c/o abdominal pain  GENITOURINARY:  No dysuria  NEUROLOGIC:  No headache,     Allergies    No Known Allergies    Intolerances        ANTIBIOTICS/RELEVANT:  antimicrobials  cefepime   IVPB      cefepime   IVPB 1000 milliGRAM(s) IV Intermittent every 8 hours  vancomycin    Solution 125 milliGRAM(s) Oral every 6 hours    immunologic:    OTHER:  acetaminophen    Suspension .. 650 milliGRAM(s) Oral every 6 hours PRN  chlorhexidine 2% Cloths 1 Application(s) Topical <User Schedule>  heparin   Injectable 5000 Unit(s) SubCutaneous every 8 hours  insulin lispro (ADMELOG) corrective regimen sliding scale   SubCutaneous every 6 hours  methimazole 20 milliGRAM(s) Oral daily  metoclopramide Injectable 10 milliGRAM(s) IV Push every 8 hours  mirtazapine 7.5 milliGRAM(s) Oral daily  multivitamin 1 Tablet(s) Oral daily  ondansetron Injectable 4 milliGRAM(s) IV Push every 6 hours PRN  pantoprazole  Injectable 40 milliGRAM(s) IV Push every 12 hours  propranolol 20 milliGRAM(s) Oral every 8 hours  sertraline 50 milliGRAM(s) Oral daily  thiamine 100 milliGRAM(s) Oral daily      Objective:  Vital Signs Last 24 Hrs  T(C): 37.7 (23 Aug 2021 16:00), Max: 37.7 (23 Aug 2021 16:00)  T(F): 99.9 (23 Aug 2021 16:00), Max: 99.9 (23 Aug 2021 16:00)  HR: 114 (23 Aug 2021 17:00) (95 - 125)  BP: --  BP(mean): --  RR: 20 (23 Aug 2021 17:00) (15 - 44)  SpO2: 100% (23 Aug 2021 17:00) (96% - 100%)    PHYSICAL EXAM:  Constitutional:no acute distress  Eyes:AOLNSO, EOMI  Ear/Nose/Throat: no oral lesions, increasing secretions from trach site	  Respiratory: coarse bilat  Cardiovascular: S1S2  Gastrointestinal:soft, (+) BS, no tenderness cholecystotomy tube  Extremities:no e/e/c  No Lymphadenopathy  IV sites not inflammed.    LABS:                        8.7    15.65 )-----------( 284      ( 23 Aug 2021 00:37 )             27.9     08-23    130<L>  |  93<L>  |  15  ----------------------------<  172<H>  4.5   |  26  |  0.47<L>    Ca    9.8      23 Aug 2021 00:36  Phos  3.2     08-23  Mg     1.8     08-23    TPro  8.4<H>  /  Alb  3.3  /  TBili  0.3  /  DBili  x   /  AST  26  /  ALT  35  /  AlkPhos  217<H>  08-23    PT/INR - ( 23 Aug 2021 00:37 )   PT: 13.6 sec;   INR: 1.14 ratio               MICROBIOLOGY:            RECENT CULTURES:  08-20 @ 23:18  .Blood Blood-Peripheral  --  --  --    No growth to date.  --      RADIOLOGY & ADDITIONAL STUDIES:    < from: Xray Chest 1 View- PORTABLE-Routine (Xray Chest 1 View- PORTABLE-Routine in AM.) (08.23.21 @ 02:40) >  IMPRESSION:  Elevated right hemidiaphragm. No focal consolidations, pneumothorax, or pleural effusions.    Postoperative changes as above.    Tracheostomy tube and enteric tube in satisfactory position.    < end of copied text >

## 2021-08-23 NOTE — PROGRESS NOTE ADULT - SUBJECTIVE AND OBJECTIVE BOX
Chief complaint  Patient is a 66y old  Male who presents with a chief complaint of Anemia, Supratherapeutic INR, Dark Stools (23 Aug 2021 10:33)   Review of systems  Patient in bed, looks comfortable, no hypoglycemic episodes.    Labs and Fingersticks  CAPILLARY BLOOD GLUCOSE      POCT Blood Glucose.: 166 mg/dL (23 Aug 2021 12:56)  POCT Blood Glucose.: 154 mg/dL (23 Aug 2021 05:43)  POCT Blood Glucose.: 176 mg/dL (23 Aug 2021 00:42)  POCT Blood Glucose.: 120 mg/dL (22 Aug 2021 17:27)      Anion Gap, Serum: 11 (08-23 @ 00:36)  Anion Gap, Serum: 14 (08-22 @ 00:56)      Calcium, Total Serum: 9.8 (08-23 @ 00:36)  Calcium, Total Serum: 10.0 (08-22 @ 00:56)  Albumin, Serum: 3.3 (08-23 @ 00:36)  Albumin, Serum: 3.2 *L* (08-22 @ 00:56)    Alanine Aminotransferase (ALT/SGPT): 35 (08-23 @ 00:36)  Alanine Aminotransferase (ALT/SGPT): 35 (08-22 @ 00:56)  Alkaline Phosphatase, Serum: 217 *H* (08-23 @ 00:36)  Alkaline Phosphatase, Serum: 209 *H* (08-22 @ 00:56)  Aspartate Aminotransferase (AST/SGOT): 26 (08-23 @ 00:36)  Aspartate Aminotransferase (AST/SGOT): 24 (08-22 @ 00:56)        08-23    130<L>  |  93<L>  |  15  ----------------------------<  172<H>  4.5   |  26  |  0.47<L>    Ca    9.8      23 Aug 2021 00:36  Phos  3.2     08-23  Mg     1.8     08-23    TPro  8.4<H>  /  Alb  3.3  /  TBili  0.3  /  DBili  x   /  AST  26  /  ALT  35  /  AlkPhos  217<H>  08-23                        8.7    15.65 )-----------( 284      ( 23 Aug 2021 00:37 )             27.9     Medications  MEDICATIONS  (STANDING):  cefepime   IVPB      cefepime   IVPB 1000 milliGRAM(s) IV Intermittent every 8 hours  chlorhexidine 2% Cloths 1 Application(s) Topical <User Schedule>  heparin   Injectable 5000 Unit(s) SubCutaneous every 8 hours  insulin lispro (ADMELOG) corrective regimen sliding scale   SubCutaneous every 6 hours  methimazole 20 milliGRAM(s) Oral daily  metoclopramide Injectable 10 milliGRAM(s) IV Push every 8 hours  mirtazapine 7.5 milliGRAM(s) Oral daily  multivitamin 1 Tablet(s) Oral daily  pantoprazole  Injectable 40 milliGRAM(s) IV Push every 12 hours  propranolol 20 milliGRAM(s) Oral every 8 hours  sertraline 50 milliGRAM(s) Oral daily  thiamine 100 milliGRAM(s) Oral daily  vancomycin    Solution 125 milliGRAM(s) Oral every 6 hours      Physical Exam  General: Patient comfortable in bed  Vital Signs Last 12 Hrs  T(F): 98.8 (08-23-21 @ 12:00), Max: 98.8 (08-23-21 @ 12:00)  HR: 105 (08-23-21 @ 16:00) (95 - 125)  BP: --  BP(mean): --  RR: 22 (08-23-21 @ 16:00) (15 - 44)  SpO2: 99% (08-23-21 @ 16:00) (96% - 100%)  Neck: No palpable thyroid nodules.  CVS: S1S2, No murmurs  Respiratory: No wheezing, no crepitations  GI: Abdomen soft, bowel sounds positive  Musculoskeletal:  edema lower extremities.   Skin: No skin rashes, no ecchymosis    Diagnostics    US Thyroid: Routine   Indication: hyperthyroidism  Transport: Stretcher-Crib,  w/ Monitor  Provider's Contact #: [phone] (08-08 @ 16:13)  TSH Receptor Antibody: AM Sched. Collection: 09-Aug-2021 06:00 (08-08 @ 13:31)  Thyroperoxidase Antibody: AM Sched. Collection: 09-Aug-2021 06:00 (08-08 @ 13:31)           Chief complaint  Patient is a 66y old  Male who presents with a chief complaint of Anemia, Supratherapeutic INR, Dark Stools (23 Aug 2021 10:33)   Review of systems  Patient in bed, looks comfortable, no hypoglycemic episodes.    Labs and Fingersticks  CAPILLARY BLOOD GLUCOSE      POCT Blood Glucose.: 166 mg/dL (23 Aug 2021 12:56)  POCT Blood Glucose.: 154 mg/dL (23 Aug 2021 05:43)  POCT Blood Glucose.: 176 mg/dL (23 Aug 2021 00:42)  POCT Blood Glucose.: 120 mg/dL (22 Aug 2021 17:27)      Anion Gap, Serum: 11 (08-23 @ 00:36)  Anion Gap, Serum: 14 (08-22 @ 00:56)      Calcium, Total Serum: 9.8 (08-23 @ 00:36)  Calcium, Total Serum: 10.0 (08-22 @ 00:56)  Albumin, Serum: 3.3 (08-23 @ 00:36)  Albumin, Serum: 3.2 *L* (08-22 @ 00:56)    Alanine Aminotransferase (ALT/SGPT): 35 (08-23 @ 00:36)  Alanine Aminotransferase (ALT/SGPT): 35 (08-22 @ 00:56)  Alkaline Phosphatase, Serum: 217 *H* (08-23 @ 00:36)  Alkaline Phosphatase, Serum: 209 *H* (08-22 @ 00:56)  Aspartate Aminotransferase (AST/SGOT): 26 (08-23 @ 00:36)  Aspartate Aminotransferase (AST/SGOT): 24 (08-22 @ 00:56)        08-23    130<L>  |  93<L>  |  15  ----------------------------<  172<H>  4.5   |  26  |  0.47<L>    Ca    9.8      23 Aug 2021 00:36  Phos  3.2     08-23  Mg     1.8     08-23    TPro  8.4<H>  /  Alb  3.3  /  TBili  0.3  /  DBili  x   /  AST  26  /  ALT  35  /  AlkPhos  217<H>  08-23                        8.7    15.65 )-----------( 284      ( 23 Aug 2021 00:37 )             27.9     Medications  MEDICATIONS  (STANDING):  cefepime   IVPB      cefepime   IVPB 1000 milliGRAM(s) IV Intermittent every 8 hours  chlorhexidine 2% Cloths 1 Application(s) Topical <User Schedule>  heparin   Injectable 5000 Unit(s) SubCutaneous every 8 hours  insulin lispro (ADMELOG) corrective regimen sliding scale   SubCutaneous every 6 hours  methimazole 20 milliGRAM(s) Oral daily  metoclopramide Injectable 10 milliGRAM(s) IV Push every 8 hours  mirtazapine 7.5 milliGRAM(s) Oral daily  multivitamin 1 Tablet(s) Oral daily  pantoprazole  Injectable 40 milliGRAM(s) IV Push every 12 hours  propranolol 20 milliGRAM(s) Oral every 8 hours  sertraline 50 milliGRAM(s) Oral daily  thiamine 100 milliGRAM(s) Oral daily  vancomycin    Solution 125 milliGRAM(s) Oral every 6 hours      Physical Exam  General: Patient comfortable in bed  Vital Signs Last 12 Hrs  T(F): 98.8 (08-23-21 @ 12:00), Max: 98.8 (08-23-21 @ 12:00)  HR: 105 (08-23-21 @ 16:00) (95 - 125)  BP: --  BP(mean): --  RR: 22 (08-23-21 @ 16:00) (15 - 44)  SpO2: 99% (08-23-21 @ 16:00) (96% - 100%)  Neck: No palpable thyroid nodules.  CVS: S1S2, No murmurs  Respiratory: No wheezing, no crepitations  GI: Abdomen soft, bowel sounds positive  Musculoskeletal:  edema lower extremities.   Skin: No skin rashes, no ecchymosis    Diagnostics    US Thyroid: Routine   Indication: hyperthyroidism  Transport: Stretcher-Crib,  w/ Monitor  Provider's Contact #: [phone] (08-08 @ 16:13)  TSH Receptor Antibody: AM Sched. Collection: 09-Aug-2021 06:00 (08-08 @ 13:31)  Thyroperoxidase Antibody: AM Sched. Collection: 09-Aug-2021 06:00 (08-08 @ 13:31)

## 2021-08-23 NOTE — PROGRESS NOTE ADULT - SUBJECTIVE AND OBJECTIVE BOX
RICKY HAMPTON  MRN-[id number]  Patient is a 66y old  Male who presents with a chief complaint of Anemia, Supratherapeutic INR, Dark Stools (23 Aug 2021 10:29)    HPI:  65M PMH ACC/AHA stage D HF due to NICM HM2 LVAD , TV annuloplasty ring 17 as destination therapy due to severe peripheral artery disease with significant stenosis  SIADH, Depression, CKD-3 with hyperkalemia, past E. coli UTIs, driveline drainage (21) and COVID-19 (back in 2020)  He was recently seen in clinic where he complained of abdominal pain and dark stools w constipation back in May. He presents to [facility] ER today weakness and fatigue, moderate and + Black stools for three days, on coumadin secondary to warfarin use in the setting of an LVAD. Patient has required transfusions for GIB in the past. Mostly recently back in 2021 pt had anemia with dark stools. No interventions was done at that time. However Last Endoscopy was done in 2020 (negative). Today labs show patient is anemic with H/H of 4.5/16.3,. INR is 8.84 MAP in the 90s, Temp 35.1. He denies any chest pain, shortness of breath, dizziness, abd pain, nausea or vomiting.       (2021 16:57)      Surgery/Hospital Course:   admit for melena w/ anemia, INR 8.84   6/15 Capsul study (+) for small bowel bleed, balloon endoscopy (old blood in prox ileum); post EGD - septic w/ L opacity, re-intubated for concern for aspiration, TTE (Mod MR, decrease biV w/ interventricular septum boweing towards R)   bronch    +C Diff    TC    CT C/A/P: Fluid filled colon which may be 2/2 rapid transit. Small bilateral pleffs with associates. Compressive atelectasis New ISABELLE & LLL  parenchymal opacities, suspicious for pneumonia. 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    #8 Shiley trach at bedside    CPAP trials    LVAD speed increased to 9200   Bronch; Central line dc'ed   TC since , continue as tolerated. Patient transferred to SDU.    Cont PT, no trach downsize today(#8cuffed)/ Anticoagulation/ Continue TF, speech f/u/ Vanco taper    oob to chair  INR  2.47  5 mg coumadin     increased lop to 25 q8  per HF   INR today 2.64.  H&H 7.3 this AM.  Will repeat CBC at noon, and will send stool guaiac Patient with persistent abdominal tenderness, rate of tube feeds decreased.  No nausea/vomiting.     INR today 2.4H&H 9.1.6 low flow overnight /N&V  NPO resolved for capsule study  Coumadin on hold refusing Tube feeds on D5 1/2 normal  @50 cc/hr   INR 2.69  H&H 7..1 refusing Tube feeds on D5 1/2 normal  @50 cc/hr. This am + BM Anusha Oneill HF  aware- PRBC x1  GI team consulted -  NPO  plan on study in am-  D/w Dr Cadet Patient  to return to CTU for further management; 1PRBCS    Post op INR 2.2 today.  No bleeding. BC + for SM.  Pt is hypotensive requiring pressor and inotropic support.  ID follow up today on Cefepime will follow.   R PTC for PTX    CT C/A/P: sub q emphysema in R chest wall, GGO RUL, small ascites CTH negative; Abd US: GB thickening, pericholecystic fluid     Perchole drain in place continues to drain total output overnight 133.  Fever today 38.8 given tylenol.  Will repeat BC now.     duplex LE negative    Patient with persistent abdominal pain, refusing tube feeds and medications, Psych consulted   CTA A/P ordered to r/o mesenteric ischemia 2/2 persistent anorexia, nausea, vomiting. Revealed:  Evaluation of the mesenteric vessels is limited by streak artifact from LVAD. There appears to be severe stenosis of the proximal SMA; abdominal mesenteric doppler is recommended for further evaluation. 2.  No small bowel findings to suggest acute mesenteric ischemia. 3.  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  8/15: Cultures resulted BC 1/2 +GPC in clusters, SC enterobacter; mesenteric duplex: borderline stenosis of proximal SMA     Today:  TC since , continue TC as tolerated. Tolerating TwoCal HN feeds @ goal 45cc/hr. Patient is a candidate for transfer to stepdown.     REVIEW OF SYSTEMS:  Patient speaks over trach   Gen: No fever  EYES/ENT: No visual changes;  No vertigo or throat pain   NECK: No pain   RES:  No shortness of breath or Cough  CARD: No chest pain   GI: No abdominal pain  : No dysuria  NEURO: No weakness  SKIN: No itching, rashes     ICU Vital Signs Last 24 Hrs  T(C): 36.9 (23 Aug 2021 08:00), Max: 37.2 (23 Aug 2021 00:00)  T(F): 98.4 (23 Aug 2021 08:00), Max: 99 (23 Aug 2021 00:00)  HR: 103 (23 Aug 2021 09:00) (95 - 116)  BP: --  BP(mean): --  ABP: 87/59 (23 Aug 2021 09:00) (82/58 - 106/71)  ABP(mean): 71 (23 Aug 2021 09:00) (67 - 87)  RR: 20 (23 Aug 2021 09:03) (15 - 34)  SpO2: 97% (23 Aug 2021 09:03) (96% - 100%)      Physical Exam:  Gen:  Sitting in chair in NAD. Awake and alert, NAD  Psych: Withdrawn, depressed mood.  CNS:  intact, nonfocal, responds to all commands  Neck: no JVD, +TC   RES : course breath sounds, no wheezing              CVS: +LVAD hum   Abd: Soft, minimally-moderately tender in RUQ by perc dawn site, NT everywhere else, positive BS throughout. Perc dawn drain site c/d/i draining bilious fluid.  Skin: No rash  Ext:  no edema    ============================I/O===========================   I&O's Detail    22 Aug 2021 07:01  -  23 Aug 2021 07:00  --------------------------------------------------------  IN:    Enteral Tube Flush: 240 mL    IV PiggyBack: 50 mL    Miscellaneous Tube Feedin mL  Total IN: 1370 mL    OUT:    Drain (mL): 100 mL    Voided (mL): 600 mL  Total OUT: 700 mL    Total NET: 670 mL      23 Aug 2021 07:01  -  23 Aug 2021 10:34  --------------------------------------------------------  IN:    Miscellaneous Tube Feedin mL  Total IN: 90 mL    OUT:    Voided (mL): 100 mL  Total OUT: 100 mL    Total NET: -10 mL        ============================ LABS =========================                        8.7    15.65 )-----------( 284      ( 23 Aug 2021 00:37 )             27.9         130<L>  |  93<L>  |  15  ----------------------------<  172<H>  4.5   |  26  |  0.47<L>    Ca    9.8      23 Aug 2021 00:36  Phos  3.2       Mg     1.8         TPro  8.4<H>  /  Alb  3.3  /  TBili  0.3  /  DBili  x   /  AST  26  /  ALT  35  /  AlkPhos  217<H>      LIVER FUNCTIONS - ( 23 Aug 2021 00:36 )  Alb: 3.3 g/dL / Pro: 8.4 g/dL / ALK PHOS: 217 U/L / ALT: 35 U/L / AST: 26 U/L / GGT: x           PT/INR - ( 23 Aug 2021 00:37 )   PT: 13.6 sec;   INR: 1.14 ratio           ABG - ( 23 Aug 2021 00:22 )  pH, Arterial: 7.42  pH, Blood: x     /  pCO2: 49    /  pO2: 108   / HCO3: 32    / Base Excess: 6.5   /  SaO2: 99.1                ======================Micro/Rad/Cardio=================  Culture: Reviewed   CXR: Reviewed  Echo:Reviewed  ======================================================  PAST MEDICAL & SURGICAL HISTORY:  CHF (congestive heart failure)    CAD (coronary artery disease)    Depression    Pleural effusion    History of  novel coronavirus disease (COVID-19)  2020    Hemorrhoids    Bleeding hemorrhoids    Peripheral arterial disease    Claudication    BPH with urinary obstruction    ACC/AHA stage D systolic heart failure    Anticoagulation goal of INR 2.0 to 2.5    Falls    Clavicle fracture    CKD (chronic kidney disease), stage III    Iron deficiency anemia    H/O epistaxis    Vertigo    GI bleed    S/P TVR (tricuspid valve repair)    S/P ventricular assist device    S/P endoscopy      ====================ASSESSMENT ==============  Stage D Nonischemic Cardiomyopathy, Status Post HM2 on 2017    Cardiogenic shock  Hemodynamic instability   Acute hypoxemic respiratory failure  GI bleed , Status Post Enteroscopy   Anemia, in setting of melena   Chronic Kidney Disease  Stress hyperglycemia   C.diff positive on    Hypovolemic shock  Septic shock  Leukocytosis    Plan:  ====================== NEUROLOGY=====================  Nonfocal, continue to monitor neuro status   Tylenol PRN for analgesia   C/w mirtazapine and sertraline for depression  PT/Ambulate as tolerated    acetaminophen    Suspension .. 650 milliGRAM(s) Oral every 6 hours PRN Mild Pain (1 - 3)  mirtazapine 7.5 milliGRAM(s) Oral daily  sertraline 50 milliGRAM(s) Oral daily    ==================== RESPIRATORY======================  Acute hypoxemic respiratory failure s/p #8 shiley trach on ; patient put back on assist control on  2/2 worsening of clinical condition, continue TC as tolerated (TC since )  Continue close monitoring of respiratory rate and breathing pattern, following of ABG’s with A-line monitoring, continuous pulse oximetry monitoring.     Mechanical Ventilation:  Mode: off  FiO2: 40      ====================CARDIOVASCULAR==================  Stage D Nonischemic Cardiomyopathy, Status Post HM2 on 2017; LVAD settings 9200, flow 5.3  TTE : reveals at least moderate MR, mild AI, severe global LV syst dysfxn, dec RV fxn   Continue invasive hemodynamic monitoring   Propranolol for rate control of HR 2/2 Hyperthyroidism, titrate as tolerated    propranolol 20 milliGRAM(s) Oral every 8 hours    ===================HEMATOLOGIC/ONC ===================  Acute blood loss anemia, monitor H&H/Plts    Continue monitor LDH daily     VTE prophylaxis, heparin   Injectable 5000 Unit(s) SubCutaneous every 8 hours    ===================== RENAL =========================  Continue to monitor I/Os, BUN/Creatinine, and urine output.   Goal net negative fluid balance. Replete lytes PRN. Keep K> 4 and Mg >2.     ==================== GASTROINTESTINAL===================  S/p cholecystostomy tube placed on : with IR with -60cc of black bile, will monitor site closely.   Recent emesis on  in setting of abdominal pain, as per GI likely component of illeus given critical illness   CTA A/P : Evaluation of the mesenteric vessels is limited by streak artifact from LVAD. There appears to be severe stenosis of the proximal SMA; abdominal mesenteric doppler is recommended for further evaluation. 2.  No small bowel findings to suggest acute mesenteric ischemia. 3.  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  Vascular surgery and IR consulted for potential SMA stent. Both teams state that SMA stent is not warranted as they don't believe SMA stenosis is as severe on CTA as read by radiology and do not believe that this stenosis is causing patient's intolerance to TFs  Mesenteric duplex on 8/15 borderline stenosis of proximal SMA   Tolerating TwoCal HN feeds @ goal 45cc/hr   Zofran PRN for nausea   Reglan for gut motility   S&S will re-eval    multivitamin 1 Tablet(s) Oral daily  GI prophylaxis, pantoprazole  Injectable 40 milliGRAM(s) IV Push every 12 hours  metoclopramide Injectable 10 milliGRAM(s) IV Push every 8 hours  ondansetron Injectable 4 milliGRAM(s) IV Push every 6 hours PRN Nausea and/or Vomiting  thiamine 100 milliGRAM(s) Oral daily    =======================    ENDOCRINE  =====================  Stress hyperglycemia, continue glucose control with admelog sliding scale   Thyroid US to evaluate for thyroid nodule/goiter in setting of hyperthyroid  - contraindicated at this time 2/2 trach/will do when trach is out   TSH persistently low, Free T4 low. c/w Methimazole     insulin lispro (ADMELOG) corrective regimen sliding scale   SubCutaneous every 6 hours  methimazole 20 milliGRAM(s) Oral daily    ========================INFECTIOUS DISEASE================  Afebrile, WBC rising 13.76->15.65  Monitor temperature and trend WBC.   BC 1/2 +GPC in clusters, SCx+ enterobacter(CRE). C/w cefepime.  Vancomycin  PO for c.dif ppx    cefepime   IVPB 1000 milliGRAM(s) IV Intermittent every 8 hours  vancomycin    Solution 125 milliGRAM(s) Oral every 6 hours      Patient requires continuous monitoring with bedside rhythm monitoring, pulse ox monitoring, and intermittent blood gas analysis. Care plan discussed with ICU care team. Patient remained critical and at risk for life threatening decompensation.     By signing my name below, I, Agnieszka Cherry, attest that this documentation has been prepared under the direction and in the presence of Jaclyn Mendoza NP   Electronically signed: Cesia Shaffer, 21 @ 10:34    I, Jaclyn Mendoza, personally performed the services described in this documentation. all medical record entries made by the luisibmel were at my direction and in my presence. I have reviewed the chart and agree that the record reflects my personal performance and is accurate and complete  Electronically signed: Jaclyn Mendoza NP

## 2021-08-23 NOTE — PROGRESS NOTE ADULT - ASSESSMENT
65 YO M with a history of stage D NICM s/p HM2 on 9/2017 as DT (due to severe PAD) with TV ring, prior COVID-19 infection 4/2020, recurrent syncope post LVAD s/p ILR, and chronic abdominal pain with prior negative workup who was admitted with symptomatic anemia with Hgb 4.5 in setting of INR 8.8 without hemodynamic instability. He was transfused and underwent VCE which showed active bleeding in the mid small bowel but subsequent enteroscopy 6/15 did not reveal any active bleeding. He acutely decompensated after procedure with fever/hypertension, low flow alarms, and pulmonary infiltrate with hypoxia requiring intubation from probable aspiration PNA. His LDH is normal and there are no signs of overt LVAD dysfunction on echo though signs of insufficiently unloaded ventricle for which his speed has been increased. Course notable for inability to wean ventilator with persistent secretions for which he underwent tracheostomy as well as acute c diff colitis, now resolved. Worsening anemia requiring transfusion with low flow alarms, concerning for recurrent GI bleed, now with stable Hgb. Had hypotension and septic picture. Cxs pos for serratia s/p Cholecystostomy tube by IR. Sputum culture from 8/13 positive for Enterobacter for which he is being treated with IV Meropenem and IV Vancomycin with downtrending leukocytosis. He has ongoing diffuse ABD pain with inability to tolerate tube feeds. CT ABD obtained to r/o mesenteric ischemia but limited evaluation due to artifact though with some concern of stenosis of proximal SMA. Plan to pursue mesenteric artery doppler today and vascular following for possible stent. Significant concern for malnutrition and further deconditioning in the setting of not able to tolerate feeds. Should nutrition via PEG be only viable option and he is not amendable, to consult palliative and ethics for further guidance.    65 YO M with a history of stage D NICM s/p HM2 on 9/2017 as DT (due to severe PAD) with TV ring, prior COVID-19 infection 4/2020, recurrent syncope post LVAD s/p ILR, and chronic abdominal pain with prior negative workup who was admitted with symptomatic anemia with Hgb 4.5 in setting of INR 8.8 without hemodynamic instability. He was transfused and underwent VCE which showed active bleeding in the mid small bowel but subsequent enteroscopy 6/15 did not reveal any active bleeding. He acutely decompensated after procedure with fever/hypertension, low flow alarms, and pulmonary infiltrate with hypoxia requiring intubation from probable aspiration PNA. His LDH is normal and there are no signs of overt LVAD dysfunction on echo though signs of insufficiently unloaded ventricle for which his speed has been increased. Course notable for inability to wean ventilator with persistent secretions for which he underwent tracheostomy as well as acute c diff colitis, now resolved. Worsening anemia requiring transfusion with low flow alarms, concerning for recurrent GI bleed, now with stable Hgb. Had hypotension and septic picture. Cxs pos for serratia s/p Cholecystostomy tube by IR. Sputum culture from 8/13 positive for Enterobacter for which he is being treated with IV Meropenem and IV Vancomycin with downtrending leukocytosis. He has ongoing diffuse ABD pain with inability to tolerate tube feeds. CT ABD obtained to r/o mesenteric ischemia but limited evaluation due to artifact though with some concern of stenosis of proximal SMA. Patient has been tolerating feeds now however he now had a productive cough and leukocytosis.

## 2021-08-23 NOTE — PROGRESS NOTE ADULT - PROBLEM SELECTOR PLAN 3
- unlikely related to low cardiac output  - GI input, RUQ sono 8/6 showed decompressed gallbladder and no focal fluid collection  - CT ABD 8/13 limited evaluation but concern severe stenosis of proximal SMA for which vascular Sx is following - improved and now tolerating feeds. Likely due to irritation from the dawn drain

## 2021-08-23 NOTE — PROGRESS NOTE ADULT - SUBJECTIVE AND OBJECTIVE BOX
[facility] NUTRITION SUPPORT-- FOLLOW UP NOTE  --------------------------------------------------------------------------------    24 hour events/subjective: pt seen and examined. resting in bed, appears comfortable. per rn, tolerating tf at 45cc/hr, no vomiting, +bm x2, no acute overnight events.    Diet:  Diet, NPO with Tube Feed:   Tube Feeding Modality: Nasogastric  TwoCal HN (TWOCALHNRTH)  Total Volume for 24 Hours (mL): 1080  Continuous  Starting Tube Feed Rate mL per Hour: 10  Increase Tube Feed Rate by (mL): 10     Every 6 hours  Until Goal Tube Feed Rate (mL per Hour): 45  Tube Feed Duration (in Hours): 24  Tube Feed Start Time: 13:25 (21 @ 13:37)      PAST HISTORY  --------------------------------------------------------------------------------  No significant changes to PMH, PSH, FHx, SHx, unless otherwise noted    ALLERGIES & MEDICATIONS  --------------------------------------------------------------------------------  Allergies    No Known Allergies    Intolerances      Standing Inpatient Medications  cefepime   IVPB      cefepime   IVPB 1000 milliGRAM(s) IV Intermittent every 8 hours  chlorhexidine 2% Cloths 1 Application(s) Topical <User Schedule>  heparin   Injectable 5000 Unit(s) SubCutaneous every 8 hours  insulin lispro (ADMELOG) corrective regimen sliding scale   SubCutaneous every 6 hours  methimazole 20 milliGRAM(s) Oral daily  metoclopramide Injectable 10 milliGRAM(s) IV Push every 8 hours  mirtazapine 7.5 milliGRAM(s) Oral daily  multivitamin 1 Tablet(s) Oral daily  pantoprazole  Injectable 40 milliGRAM(s) IV Push every 12 hours  propranolol 20 milliGRAM(s) Oral every 8 hours  sertraline 50 milliGRAM(s) Oral daily  thiamine 100 milliGRAM(s) Oral daily  vancomycin    Solution 125 milliGRAM(s) Oral every 6 hours    PRN Inpatient Medications  acetaminophen    Suspension .. 650 milliGRAM(s) Oral every 6 hours PRN  ondansetron Injectable 4 milliGRAM(s) IV Push every 6 hours PRN        REVIEW OF SYSTEMS  --------------------------------------------------------------------------------    General:  as per hpi  HEENT:  no headaches, no vision changes, no ear pain  CV:  no chest pain, no palpitations  Resp:  see hpi  GI:  as per hpi  :  no pain, no bleeding, no dysuria  Muscle:  no pain, no weakness  Neuro:  no numbness, no tingling, no memory problems  Psych:  no insomnia  Endocrine:  no cold/heat intolerance  Heme: no ecchymosis, no easy bruising  Skin:  no rash, no edema    all other systems were reviewed and are negative, except as noted       VITALS/PHYSICAL EXAM  --------------------------------------------------------------------------------  T(C): 37 (21 @ 04:00), Max: 37.2 (21 @ 00:00)  HR: 95 (21 @ 07:00) (95 - 116)  BP: --  RR: 16 (21 @ 07:00) (15 - 38)  SpO2: 100% (21 @ 07:00) (94% - 100%)  Wt(kg): --      21 @ 07:01  -  21 @ 07:00  --------------------------------------------------------  IN: 1370 mL / OUT: 700 mL / NET: 670 mL      I&O's Detail    22 Aug 2021 07:01  -  23 Aug 2021 07:00  --------------------------------------------------------  IN:    Enteral Tube Flush: 240 mL    IV PiggyBack: 50 mL    Miscellaneous Tube Feedin mL  Total IN: 1370 mL    OUT:    Drain (mL): 100 mL    Voided (mL): 600 mL  Total OUT: 700 mL    Total NET: 670 mL        Physical Exam:  Gen: lying in bed, frail  HEENT: +trach +ngt   Chest: respirations non labored  Abd: soft nd perc c-tube in place   LE: no edema  Neuro: awake alert responsive      LABS/STUDIES  --------------------------------------------------------------------------------              8.7    15.65 >-----------<  284      [21 @ 00:37]              27.9     130  |  93  |  15  ----------------------------<  172      [21 @ 00:36]  4.5   |  26  |  0.47        Ca     9.8     [21 @ 00:36]      Mg     1.8     [21 @ 00:36]      Phos  3.2     [21 @ 00:36]    TPro  8.4  /  Alb  3.3  /  TBili  0.3  /  DBili  x   /  AST  26  /  ALT  35  /  AlkPhos  217  [21 @ 00:36]    PT/INR: PT 13.6 , INR 1.14       [21 @ 00:37]          [21 @ 00:36]    Ca ionizedBlood Gas Arterial - Calcium, Ionized: 1.27 mmol/L (21 @ 00:22)  Blood Gas Arterial - Calcium, Ionized: 1.28 mmol/L (21 @ 00:32)    Creatinine Trend:  POC glucoseGlucose, Serum: 172 mg/dL (21 @ 00:36)  CAPILLARY BLOOD GLUCOSE      POCT Blood Glucose.: 154 mg/dL (23 Aug 2021 05:43)  POCT Blood Glucose.: 176 mg/dL (23 Aug 2021 00:42)  POCT Blood Glucose.: 120 mg/dL (22 Aug 2021 17:27)  POCT Blood Glucose.: 161 mg/dL (22 Aug 2021 12:42)    PrealbuminPrealbumin, Serum: 11 mg/dL (21 @ 04:01)  Prealbumin, Serum: 10 mg/dL (08-15-21 @ 13:53)    Triglycerides

## 2021-08-23 NOTE — PROGRESS NOTE ADULT - PROBLEM SELECTOR PLAN 4
- serratia bacteremia and poss acalculous cholecystitis. B/c with gram + cocci and many enterobacter Carbapenem resistant strain  - sputum Cx 8/13 with enterobacter   - per ID, cefepime for 7 days, got vanc, getting level  - also on ppx PO Vancomycin   - further guidance by ID - serratia bacteremia and poss acalculous cholecystitis. B/c with gram + cocci and many enterobacter Carbapenem resistant strain  -patient with increase sputum production, will repeat infectious work up   - sputum Cx 8/13 with enterobacter   - per ID, cefepime for 7 days, got vanc, getting level  - also on ppx PO Vancomycin   - further guidance by ID

## 2021-08-23 NOTE — PROGRESS NOTE ADULT - SUBJECTIVE AND OBJECTIVE BOX
Subjective:    Medications:  acetaminophen    Suspension .. 650 milliGRAM(s) Oral every 6 hours PRN  cefepime   IVPB      cefepime   IVPB 1000 milliGRAM(s) IV Intermittent every 8 hours  chlorhexidine 2% Cloths 1 Application(s) Topical <User Schedule>  heparin   Injectable 5000 Unit(s) SubCutaneous every 8 hours  insulin lispro (ADMELOG) corrective regimen sliding scale   SubCutaneous every 6 hours  methimazole 20 milliGRAM(s) Oral daily  metoclopramide Injectable 10 milliGRAM(s) IV Push every 8 hours  mirtazapine 7.5 milliGRAM(s) Oral daily  multivitamin 1 Tablet(s) Oral daily  ondansetron Injectable 4 milliGRAM(s) IV Push every 6 hours PRN  pantoprazole  Injectable 40 milliGRAM(s) IV Push every 12 hours  propranolol 20 milliGRAM(s) Oral every 8 hours  sertraline 50 milliGRAM(s) Oral daily  thiamine 100 milliGRAM(s) Oral daily  vancomycin    Solution 125 milliGRAM(s) Oral every 6 hours      Vitals:  Vital Signs Last 24 Hrs  T(C): 36.9 (23 Aug 2021 08:00), Max: 37.2 (23 Aug 2021 00:00)  T(F): 98.4 (23 Aug 2021 08:00), Max: 99 (23 Aug 2021 00:00)  HR: 103 (23 Aug 2021 09:00) (95 - 116)  BP: --  BP(mean): --  RR: 20 (23 Aug 2021 09:03) (15 - 34)  SpO2: 97% (23 Aug 2021 09:03) (94% - 100%)    Daily     Daily Weight in k (23 Aug 2021 00:00)      I&O's Summary    22 Aug 2021 07:01  -  23 Aug 2021 07:00  --------------------------------------------------------  IN: 1370 mL / OUT: 700 mL / NET: 670 mL    23 Aug 2021 07:01  -  23 Aug 2021 09:14  --------------------------------------------------------  IN: 90 mL / OUT: 100 mL / NET: -10 mL        Physical Exam:     General: No distress. Comfortable.  Neck: Neck supple. JVP not elevated. No masses  Chest: Clear to auscultation bilaterally  CV: Typical LVAD sounds. No palpable pulses  Abdomen: Soft, non-distended, non-tender  Extremities: no LE edema, warm and well perfused  Skin: No rashes or skin breakdown  Neurology: Alert and oriented times three. Sensation intact  Psych: Affect normal    LVAD Interrogation: Heart Mate 3  Speed:  Flow:  Power:  PI:  Event:  No programming changes were made    Labs:                        8.7    15.65 )-----------( 284      ( 23 Aug 2021 00:37 )             27.9         130<L>  |  93<L>  |  15  ----------------------------<  172<H>  4.5   |  26  |  0.47<L>    Ca    9.8      23 Aug 2021 00:36  Phos  3.2       Mg     1.8         TPro  8.4<H>  /  Alb  3.3  /  TBili  0.3  /  DBili  x   /  AST  26  /  ALT  35  /  AlkPhos  217<H>      PT/INR - ( 23 Aug 2021 00:37 )   PT: 13.6 sec;   INR: 1.14 ratio                   Lactate Dehydrogenase, Serum: 187 U/L ( @ 00:36)  Lactate Dehydrogenase, Serum: 177 U/L ( @ 00:56)  Lactate Dehydrogenase, Serum: 181 U/L ( @ 00:37)       Subjective: Patient is having a lot of secretions this morning. He is not in a good mood.     Medications:  acetaminophen    Suspension .. 650 milliGRAM(s) Oral every 6 hours PRN  cefepime   IVPB      cefepime   IVPB 1000 milliGRAM(s) IV Intermittent every 8 hours  chlorhexidine 2% Cloths 1 Application(s) Topical <User Schedule>  heparin   Injectable 5000 Unit(s) SubCutaneous every 8 hours  insulin lispro (ADMELOG) corrective regimen sliding scale   SubCutaneous every 6 hours  methimazole 20 milliGRAM(s) Oral daily  metoclopramide Injectable 10 milliGRAM(s) IV Push every 8 hours  mirtazapine 7.5 milliGRAM(s) Oral daily  multivitamin 1 Tablet(s) Oral daily  ondansetron Injectable 4 milliGRAM(s) IV Push every 6 hours PRN  pantoprazole  Injectable 40 milliGRAM(s) IV Push every 12 hours  propranolol 20 milliGRAM(s) Oral every 8 hours  sertraline 50 milliGRAM(s) Oral daily  thiamine 100 milliGRAM(s) Oral daily  vancomycin    Solution 125 milliGRAM(s) Oral every 6 hours      Vitals:  ICU Vital Signs Last 24 Hrs  T(C): 37.1 (23 Aug 2021 12:00), Max: 37.2 (23 Aug 2021 00:00)  T(F): 98.8 (23 Aug 2021 12:00), Max: 99 (23 Aug 2021 00:00)  HR: 125 (23 Aug 2021 13:00) (95 - 125)  ABP: 97/66 (23 Aug 2021 13:00) (82/58 - 106/71)  ABP(mean): 79 (23 Aug 2021 13:00) (67 - 87)  RR: 69 (23 Aug 2021 13:00) (15 - 69)  SpO2: 98% (23 Aug 2021 13:00) (96% - 100%)      Daily     Daily Weight in k (23 Aug 2021 00:00)      I&O's Summary    22 Aug 2021 07:01  -  23 Aug 2021 07:00  --------------------------------------------------------  IN: 1370 mL / OUT: 700 mL / NET: 670 mL    23 Aug 2021 07:01  -  23 Aug 2021 09:14  --------------------------------------------------------  IN: 90 mL / OUT: 100 mL / NET: -10 mL        Physical Exam:     General: No distress. Comfortable.  Neck: Neck supple. JVP not elevated. No masses  Chest: Clear to auscultation bilaterally  CV: Typical LVAD sounds. No palpable pulses  Abdomen: Soft, non-distended, non-tender  Extremities: no LE edema, warm and well perfused  Skin: No rashes or skin breakdown  Neurology: Alert and oriented times three. Sensation intact  Psych: Affect normal    LVAD Interrogation: Heart Mate 3  Speed: 9200  Flow: 5.2  Power: 5.4  PI: 6  Event: some PI events without any speed changes  No programming changes were made    Labs:                        8.7    15.65 )-----------( 284      ( 23 Aug 2021 00:37 )             27.9         130<L>  |  93<L>  |  15  ----------------------------<  172<H>  4.5   |  26  |  0.47<L>    Ca    9.8      23 Aug 2021 00:36  Phos  3.2       Mg     1.8         TPro  8.4<H>  /  Alb  3.3  /  TBili  0.3  /  DBili  x   /  AST  26  /  ALT  35  /  AlkPhos  217<H>      PT/INR - ( 23 Aug 2021 00:37 )   PT: 13.6 sec;   INR: 1.14 ratio                   Lactate Dehydrogenase, Serum: 187 U/L ( @ 00:36)  Lactate Dehydrogenase, Serum: 177 U/L ( @ 00:56)  Lactate Dehydrogenase, Serum: 181 U/L ( @ 00:37)       Subjective: Patient is having a lot of secretions this morning. He is not in a good mood.     Medications:  acetaminophen    Suspension .. 650 milliGRAM(s) Oral every 6 hours PRN  cefepime   IVPB      cefepime   IVPB 1000 milliGRAM(s) IV Intermittent every 8 hours  chlorhexidine 2% Cloths 1 Application(s) Topical <User Schedule>  heparin   Injectable 5000 Unit(s) SubCutaneous every 8 hours  insulin lispro (ADMELOG) corrective regimen sliding scale   SubCutaneous every 6 hours  methimazole 20 milliGRAM(s) Oral daily  metoclopramide Injectable 10 milliGRAM(s) IV Push every 8 hours  mirtazapine 7.5 milliGRAM(s) Oral daily  multivitamin 1 Tablet(s) Oral daily  ondansetron Injectable 4 milliGRAM(s) IV Push every 6 hours PRN  pantoprazole  Injectable 40 milliGRAM(s) IV Push every 12 hours  propranolol 20 milliGRAM(s) Oral every 8 hours  sertraline 50 milliGRAM(s) Oral daily  thiamine 100 milliGRAM(s) Oral daily  vancomycin    Solution 125 milliGRAM(s) Oral every 6 hours      Vitals:  ICU Vital Signs Last 24 Hrs  T(C): 37.1 (23 Aug 2021 12:00), Max: 37.2 (23 Aug 2021 00:00)  T(F): 98.8 (23 Aug 2021 12:00), Max: 99 (23 Aug 2021 00:00)  HR: 125 (23 Aug 2021 13:00) (95 - 125)  ABP: 97/66 (23 Aug 2021 13:00) (82/58 - 106/71)  ABP(mean): 79 (23 Aug 2021 13:00) (67 - 87)  RR: 69 (23 Aug 2021 13:00) (15 - 69)  SpO2: 98% (23 Aug 2021 13:00) (96% - 100%)      Daily     Daily Weight in k (23 Aug 2021 00:00)      I&O's Summary    22 Aug 2021 07:01  -  23 Aug 2021 07:00  --------------------------------------------------------  IN: 1370 mL / OUT: 700 mL / NET: 670 mL    23 Aug 2021 07:01  -  23 Aug 2021 09:14  --------------------------------------------------------  IN: 90 mL / OUT: 100 mL / NET: -10 mL        Physical Exam:     General: No distress. Comfortable.  Neck: Neck supple. JVP not elevated. No masses  Chest: Clear to auscultation bilaterally  CV: Typical LVAD sounds. No palpable pulses  Abdomen: Soft, non-distended, non-tender  Extremities: no LE edema, warm and well perfused  Skin: No rashes or skin breakdown  Neurology: Alert and oriented times three. Sensation intact  Psych: Affect normal    LVAD Interrogation: Heart Mate 2  Speed: 9200  Flow: 5.2  Power: 5.4  PI: 6  Event: some PI events without any speed changes  No programming changes were made    Labs:                        8.7    15.65 )-----------( 284      ( 23 Aug 2021 00:37 )             27.9         130<L>  |  93<L>  |  15  ----------------------------<  172<H>  4.5   |  26  |  0.47<L>    Ca    9.8      23 Aug 2021 00:36  Phos  3.2       Mg     1.8         TPro  8.4<H>  /  Alb  3.3  /  TBili  0.3  /  DBili  x   /  AST  26  /  ALT  35  /  AlkPhos  217<H>      PT/INR - ( 23 Aug 2021 00:37 )   PT: 13.6 sec;   INR: 1.14 ratio                   Lactate Dehydrogenase, Serum: 187 U/L ( @ 00:36)  Lactate Dehydrogenase, Serum: 177 U/L ( @ 00:56)  Lactate Dehydrogenase, Serum: 181 U/L ( @ 00:37)

## 2021-08-23 NOTE — PROGRESS NOTE ADULT - ASSESSMENT
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 coli UTIs, driveline drainage (1/7/21) and COVID-19, here initially for GIB prolonged hospital course, s/p tracheostomy, acalculous cholecystitis s/p perc dawn. Patient has persistent intermittent abdominal pain. Per CTU team, pt has recently been refusing tube feeds and is being evaluated for chronic mesenteric ischemia vs SMA syndrome.  8/13 bld cxs positive. Consulted for TPN. Prealb 11, prior a1c 5.6, tg 141. 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     -tube feeds per CTU team  -no plan to initiate TPN at this time, will reassess need pending clinical course  -suspect uncontrolled hyperthyroidism contributing to metabolic issues; restarted on methimazole 8/17; endocrine following  -abdominal pain- overall w improvement, gi/vascular/rheum input noted  -anemia- rec iron supp when feasible   -hyponatremia- consider sending urine lytes if cont to downtrend  -psych, palliative care, and ethics input noted  -management per CTU; willremain available as needed    discussed with Dr. Soliz and Dr. IVAN Tang, agreeable with above    TPN pager [phone], spectra [phone]  M-F 6A-4P, Weekends and holidays 8A-12P

## 2021-08-23 NOTE — PROGRESS NOTE ADULT - ASSESSMENT
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 coli UTIs, driveline drainage (1/7/21) and COVID-19 (back in April 2020)  He was recently seen in clinic where he complained of abdominal pain and dark stools w constipation back in May. He presents to [facility] ER today weakness and fatigue, moderate and + Black stools for three days, on coumadin secondary to warfarin use in the setting of an LVAD. Patient has required transfusions for GIB in the past. Mostly recently back in jan 2021 pt had anemia with dark stools. No interventions was done at that time. However Last Endoscopy was done in July 2020 (negative). Today labs show patient is anemic with H/H of 4.5/16.3,. INR is 8.84 MAP in the 90s,   Returned from endoscopy appearing ill tachypneic with chills with new white out of his left lung    Remains with complaints of abdominal pain  leukocytosis  increasing tan secretions from trach site-  would send for repeat gram stain and culture  Palliative care consult to discuss [facility]        Morris Prater MD  [phone]  After 5pm/weekends [phone]

## 2021-08-23 NOTE — PROGRESS NOTE ADULT - PROBLEM SELECTOR PLAN 2
- Current speed 9200 RPM; no alarms in past 24 hrs  - HOLD coumadin given recurrent bleed. Will hold indefinitely.   - Will plan to hold aspirin indefinitely given recurrent GIB.  - Continue to monitor LDH, stable - Current speed 9200 RPM; no alarms in past 24 hrs  - will hold coumadin indefinitely given GIB  - Will plan to hold aspirin indefinitely given recurrent GIB.  - Continue to monitor LDH, stable

## 2021-08-23 NOTE — PROGRESS NOTE ADULT - ASSESSMENT
Assessment and Recommendation:   · Assessment	  Assessment and recommendation :  Recurrent Acute hypoxic respiratory Failure S/P tracheostomy on trach. collar  50% FI02,   Acute blood loss anemia S/P multiple  blood transfusion   S/P septic shock off vasopressin , levophed and off  Dobutamine   S/P cholecystostomy tube placement by IR    AF RVR back to regular sinus Rhythm   Non ischemic cardiomyopathy continue ACE inhibitor and B-Blockers   S/P Septic shock and cardiogenic shock   Stage D systolic heart failure S/P LVAD HM2   MH2 LVAD  with  TV Annuloplasty  Severe peripheral vascular disease   no evidence of significant mesenteric thrombosis   severe hyperglycemia on insulin coverage    hyperthyroidism on Methimazole 10mg TID   critical care polyneuropathy   Anemia of Acute blood Loss   severe protein caloric malnutrition   S/P Small bowel bleeding   S/P blood and FFP transfusion   Chronic kidney disease stage III  resume  NGT feeding .tolerating it well   GI prophylaxis with PPI

## 2021-08-23 NOTE — CONSULT NOTE ADULT - ASSESSMENT
Recommendation: At present, Mr. Quispe has resumed feeds. Determining the etiology of his abdominal pain as well as optimizing management of his abdominal pain would be prudent in this situation. Continued assessment of his capacity and optimization of his mood should be addressed by Behavioral Health.   ETHICS SERVICE WILL REMAIN AVAILABLE FOR FURTHER CONVERSATION ABOUT THE PATIENT’S CURRENT CONDITION AND DECISION-POINTS THAT MAY OCCUR.     CASE DISCUSSED with ATTENDINGS: Matt Duffy DrPH, [facility] and Jaclyn Green MS, MD, MPH, [facility].  More than 50% of the time of this consultation was spent in coordination of Care of Patient.  REFERENCES  (i) Axel TL & Mendoza JF. Principles of Biomedical Ethics. [place]: [employer]; 2019.    (ii) Lorraine MIRANDA. Deciding life and death in the courtroom. [place], and [place] – a brief history and analysis of constitutional protection of the ‘right to die’. DARIN. 1997;278(18):2522-3843 pmid:[id number].  (iii) Kenny EK, Fady H, et al. Surviving Surrogate Decision-Making: What Helps and Hampers the Experience of Making Medical Decisions for Others. Soc Gen Int Med 2007;22:6323-3731.

## 2021-08-23 NOTE — PROGRESS NOTE ADULT - ASSESSMENT
Assessment  Hyperthyroidism: 66y Male with no history thyroid disease, was not on any thyroid supplements, previously euthyroid (June 2021), was on amiodarone (last dose 8/7), now in hyperthyroid state, Hashimotos, started on Methimazole 10mg TID and propanolol for tachycardia. Primary team increased dose to Methimazole 20mg daily, LFTs monitored, remains hyperthyroid, Palliative Care on board for goals of care.  Hyperglycemia: Patient in nondiabetic range, A1C 5.6%, now on insulin coverage, blood sugars in acceptable range, no hypoglycemias, on TFs.  CHF: on medications, stable, monitored.  Anemia: Monitored      Pavan Barone MD  Cell: [phone]  Office: [phone]       Assessment  Hyperthyroidism: 66y Male with no history thyroid disease, was not on any thyroid supplements, previously euthyroid (June 2021), was on amiodarone (last dose 8/7), now in hyperthyroid state, Hashimotos, started on Methimazole  10mg TID and propanolol for tachycardia. Primary team increased dose to Methimazole 20mg daily, LFTs monitored, remains hyperthyroid, Palliative Care on board for goals of care.  Hyperglycemia: Patient in nondiabetic range, A1C 5.6%, now on insulin coverage, blood sugars in acceptable range, no hypoglycemias, on TFs.  CHF: on medications, stable, monitored.  Anemia: Monitored      Pavan Barone MD  Cell: [phone]  Office: [phone]

## 2021-08-24 NOTE — CHART NOTE - NSCHARTNOTEFT_GEN_A_CORE
Nutrition Follow Up Note  Patient seen for: Malnutrition Follow Up     Chart reviewed, events noted. 66M, PMH ACC/AHA stage D HF 2/2 NICM HM2 LVAD, TV annuloplasty ring 17 as destination therapy due to severe PAD with significant stenosis  SIADH, Depression, CKD-3, COVID-19, here initially for GIB prolonged hospital course, s/p tracheostomy, acalculous cholecystitis s/p perc dawn. Patient c persistent intermittent abdominal pain. Per Team, pt has recently been refusing tube feeds and is being evaluated for chronic mesenteric ischemia vs SMA syndrome. Tolerating intermittent TC since     Source: EMR, Team/HF Rounds    Diet, NPO with Tube Feed:   Tube Feeding Modality: Nasogastric  TwoCal HN (TWOCALHNRTH)  Total Volume for 24 Hours (mL): 1080  Continuous  Starting Tube Feed Rate {mL per Hour}: 10  Increase Tube Feed Rate by (mL): 10     Every 6 hours  Until Goal Tube Feed Rate (mL per Hour): 45  Tube Feed Duration (in Hours): 24  Tube Feed Start Time: 13:25 (21 @ 13:37)    Current EN regimen provides: 1080ml formula, 2160kcals, 91g protein, and 756ml free H2O  EN provisions per chart:     () 96% EN goal volume achieved     () 100% EN goal volume achieved     () 96% EN goal volume achieved     () 50% EN goal volume achieved - reinitiated mid-day     () 50% EN goal volume achieved - EN held mid-day after pt refused feeds    Nutrition-related concerns:  - Pt previously refusing PEG feeds; has been tolerating current regimen  - Biliary drain in place  - Noted pt is on Reglan   - Multivitamin and thiamin supplementation ordered daily  - Insulin Sliding Scale ordered to optimize BG    GI:  Last BM .   Bowel Regimen? [] Yes   [x] No    Weight history:   - Admission weight: 176.3 pounds / 80 kg (6/15)  significant wt loss noted; wt was trending up, now trending back down - to be addressed with EN provisions    Weight in k (), 62.4 (), 66.2 (), 65.9 (), 64.9 (08-16), 62.1 (08-15), 63.6 (), 67.7 (), 71.2 ()    MEDICATIONS  (STANDING):  cefepime   IVPB  cefepime   IVPB  insulin lispro (ADMELOG) corrective regimen sliding scale  methimazole  multivitamin  pantoprazole  Injectable  propranolol  thiamine  tobramycin for Nebulization  vancomycin    Solution    Pertinent Labs:  @ 00:47: Na 129<L>, BUN 17, Cr 0.51, <H>, K+ 4.5, Phos 3.6, Mg 1.8, Alk Phos 250<H>, ALT/SGPT 35, AST/SGOT 30, HbA1c --    A1C with Estimated Average Glucose Result: 5.4 % (08-15-21 @ 13:52)  A1C with Estimated Average Glucose Result: 5.6 % (06-15-21 @ 02:42)    Finger Sticks:  POCT Blood Glucose.: 156 mg/dL ( @ 11:25)  POCT Blood Glucose.: 161 mg/dL ( @ 05:03)  POCT Blood Glucose.: 187 mg/dL ( @ 00:28)  POCT Blood Glucose.: 129 mg/dL ( @ 17:06)    Triglycerides, Serum: 141 mg/dL (08-15-21 @ 13:53)    Skin per nursing documentation: no pressure injuries   Edema: none at this time     Estimated Nutrition Needs:   Using dosing weight 78.5 kg  Energy Needs (25-30 kcal/kg): 8202-2491 kcal/day  Protein Needs (1.2-1.4 g/kg):     Previous Nutrition Diagnosis: Severe chronic malnutrition  Nutrition diagnosis is ongoing & addressed with tube feeds as tolerated    No new nutrition diagnosis at this time      Recommendations:    1. Recommend increasing TwoCal goal rate to 50ml/hr x 24hrs. At goal to provide 1200ml formula, 2400kcals, 100g protein, 840ml free H2O (meets 30.5kcals/kg & 1.27g protein/kg based on dosing wt 78.5kg).  2. Continue micronutrient supplementation as ordered  3. RD to remain available to adjust EN formulary, volume/rate PRN.     Monitoring and Evaluation:   Continue to monitor Nutritional intake, Tolerance to diet prescription, weights, labs, skin integrity  RD remains available upon request and will follow up per protocol     Wendy Travis, MS, RD, CDN, [facility]  pager #[phone]

## 2021-08-24 NOTE — CHART NOTE - NSCHARTNOTEFT_GEN_A_CORE
I accompanied patient outside today. He was eager to go but was not as talkative and seems very down. His LVAD is functioning well. Still complains of mild belly pain and overall seems depressed. We keep trying to offer ways to perk him up: playing cards, dominos. He was not interested

## 2021-08-24 NOTE — CHART NOTE - NSCHARTNOTEFT_GEN_A_CORE
Spoke with CTICU team earlier today, patient previously seen by Dr. Barone; team requesting house endocrine team to follow for hyperthyroidism. Check TFTs in AM (TSH, free T4, total T3). Note to follow in AM.    Mila Grajeda MD   On evenings and weekends, please call the office at [phone] or page endocrine fellow on call. Please note that this patient may be followed by different provider tomorrow. If no answer, contact the office.

## 2021-08-24 NOTE — PROGRESS NOTE ADULT - ASSESSMENT
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 coli UTIs, driveline drainage (1/7/21) and COVID-19, here initially for GIB prolonged hospital course, s/p tracheostomy, acalculous cholecystitis s/p perc dawn. Patient has persistent intermittent abdominal pain. Per CTU team, pt has recently been refusing tube feeds and is being evaluated for chronic mesenteric ischemia vs SMA syndrome.  8/13 bld cxs positive. Consulted for TPN. Prealb 11, prior a1c 5.6, tg 141. 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     -tube feeds per CTU team  -no plan to initiate TPN at this time, will reassess need pending clinical course  -suspect uncontrolled hyperthyroidism contributing to metabolic issues; restarted on methimazole 8/17; rec increasing propranolol to 20mg q6hrs given tachycardia; house endocrine consult pending  -abdominal pain- overall w improvement, gi/vascular/rheum input noted  -anemia- rec iron supp when feasible   -hyponatremia- now sp lasix, consider urine lytes, trend labs  -hypomagnesemia- replete as needed  -leukocytosis- on abx, w/u per ID/primary  -psych, palliative care, and ethics input noted  -management per CTU; dw team; will remain available as needed    discussed with Dr. Soliz, agreeable with above    TPN pager [phone], spectra [phone]  M-F 6A-4P, Weekends and holidays 8A-12P

## 2021-08-24 NOTE — PROGRESS NOTE ADULT - SUBJECTIVE AND OBJECTIVE BOX
RICKY HAMPTON  MRN-[id number]  Patient is a 66y old  Male who presents with a chief complaint of Anemia, Supratherapeutic INR, Dark Stools (24 Aug 2021 07:47)    HPI:  65M PMH ACC/AHA stage D HF due to NICM HM2 LVAD , TV annuloplasty ring 17 as destination therapy due to severe peripheral artery disease with significant stenosis  SIADH, Depression, CKD-3 with hyperkalemia, past E. coli UTIs, driveline drainage (21) and COVID-19 (back in 2020)  He was recently seen in clinic where he complained of abdominal pain and dark stools w constipation back in May. He presents to [facility] ER today weakness and fatigue, moderate and + Black stools for three days, on coumadin secondary to warfarin use in the setting of an LVAD. Patient has required transfusions for GIB in the past. Mostly recently back in 2021 pt had anemia with dark stools. No interventions was done at that time. However Last Endoscopy was done in 2020 (negative). Today labs show patient is anemic with H/H of 4.5/16.3,. INR is 8.84 MAP in the 90s, Temp 35.1. He denies any chest pain, shortness of breath, dizziness, abd pain, nausea or vomiting.       (2021 16:57)      Surgery/Hospital Course:   admit for melena w/ anemia, INR 8.84   6/15 Capsul study (+) for small bowel bleed, balloon endoscopy (old blood in prox ileum); post EGD - septic w/ L opacity, re-intubated for concern for aspiration, TTE (Mod MR, decrease biV w/ interventricular septum boweing towards R)   bronch    +C Diff    TC    CT C/A/P: Fluid filled colon which may be 2/2 rapid transit. Small bilateral pleffs with associates. Compressive atelectasis New ISABELLE & LLL  parenchymal opacities, suspicious for pneumonia. 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    #8 Shiley trach at bedside    CPAP trials    LVAD speed increased to 9200   Bronch; Central line dc'ed   TC since , continue as tolerated. Patient transferred to SDU.    Cont PT, no trach downsize today(#8cuffed)/ Anticoagulation/ Continue TF, speech f/u/ Vanco taper    oob to chair  INR  2.47  5 mg coumadin     increased lop to 25 q8  per HF   INR today 2.64.  H&H 7.3 this AM.  Will repeat CBC at noon, and will send stool guaiac Patient with persistent abdominal tenderness, rate of tube feeds decreased.  No nausea/vomiting.     INR today 2.4H&H 9.1.6 low flow overnight /N&V  NPO resolved for capsule study  Coumadin on hold refusing Tube feeds on D5 1/2 normal  @50 cc/hr   INR 2.69  H&H 7..1 refusing Tube feeds on D5 1/2 normal  @50 cc/hr. This am + BM Anusha Oneill HF  aware- PRBC x1  GI team consulted -  NPO  plan on study in am-  D/w Dr Cadet Patient  to return to CTU for further management; 1PRBCS    Post op INR 2.2 today.  No bleeding. BC + for SM.  Pt is hypotensive requiring pressor and inotropic support.  ID follow up today on Cefepime will follow.   R PTC for PTX    CT C/A/P: sub q emphysema in R chest wall, GGO RUL, small ascites CTH negative; Abd US: GB thickening, pericholecystic fluid     Perchole drain in place continues to drain total output overnight 133.  Fever today 38.8 given tylenol.  Will repeat BC now.     duplex LE negative    Patient with persistent abdominal pain, refusing tube feeds and medications, Psych consulted   CTA A/P ordered to r/o mesenteric ischemia 2/2 persistent anorexia, nausea, vomiting. Revealed:  Evaluation of the mesenteric vessels is limited by streak artifact from LVAD. There appears to be severe stenosis of the proximal SMA; abdominal mesenteric doppler is recommended for further evaluation. 2.  No small bowel findings to suggest acute mesenteric ischemia. 3.  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  8/15: Cultures resulted BC 1/2 +GPC in clusters, SC enterobacter; mesenteric duplex: borderline stenosis of proximal SMA     Today:  Plan to start diuresis with IV Lasix 10mg. Start inhaled tobramycin for increased sections.     REVIEW OF SYSTEMS:  Patient speaks over trach   Gen: No fever  EYES/ENT: No visual changes;  No vertigo or throat pain   NECK: No pain   RES:  No shortness of breath or Cough  CARD: No chest pain   GI: No abdominal pain  : No dysuria  NEURO: No weakness  SKIN: No itching, rashes     ICU Vital Signs Last 24 Hrs  T(C): 36.9 (24 Aug 2021 08:00), Max: 37.7 (23 Aug 2021 16:00)  T(F): 98.5 (24 Aug 2021 08:00), Max: 99.9 (23 Aug 2021 16:00)  HR: 104 (24 Aug 2021 08:00) (101 - 126)  BP: --  BP(mean): 80 (24 Aug 2021 00:00) (78 - 80)  ABP: 103/72 (24 Aug 2021 08:00) (87/59 - 116/81)  ABP(mean): 85 (24 Aug 2021 08:00) (71 - 96)  RR: 15 (24 Aug 2021 08:00) (15 - 50)  SpO2: 100% (24 Aug 2021 08:00) (91% - 100%)      Physical Exam:  Gen:  Sitting in chair in NAD. Awake and alert, NAD  Psych: Withdrawn, depressed mood.  CNS:  intact, nonfocal, responds to all commands  Neck: no JVD, +TC   RES : course breath sounds, no wheezing              CVS: +LVAD hum   Abd: Soft, minimally-moderately tender in RUQ by perc dawn site, NT everywhere else, positive BS throughout. Perc dawn drain site c/d/i draining bilious fluid.  Skin: No rash  Ext:  no edema    ============================I/O===========================   I&O's Detail    23 Aug 2021 07:01  -  24 Aug 2021 07:00  --------------------------------------------------------  IN:    Enteral Tube Flush: 400 mL    IV PiggyBack: 200 mL    Miscellaneous Tube Feedin mL  Total IN: 1635 mL    OUT:    Drain (mL): 305 mL    Voided (mL): 640 mL  Total OUT: 945 mL    Total NET: 690 mL      24 Aug 2021 07:01  -  24 Aug 2021 08:11  --------------------------------------------------------  IN:    Miscellaneous Tube Feedin mL  Total IN: 45 mL    OUT:    Voided (mL): 100 mL  Total OUT: 100 mL    Total NET: -55 mL        ============================ LABS =========================                        10.2   21.33 )-----------( 290      ( 24 Aug 2021 00:47 )             32.3     08-    129<L>  |  91<L>  |  17  ----------------------------<  174<H>  4.5   |  26  |  0.51    Ca    10.1      24 Aug 2021 00:47  Phos  3.6       Mg     1.8         TPro  8.8<H>  /  Alb  3.5  /  TBili  0.4  /  DBili  x   /  AST  30  /  ALT  35  /  AlkPhos  250<H>      LIVER FUNCTIONS - ( 24 Aug 2021 00:47 )  Alb: 3.5 g/dL / Pro: 8.8 g/dL / ALK PHOS: 250 U/L / ALT: 35 U/L / AST: 30 U/L / GGT: x           PT/INR - ( 23 Aug 2021 00:37 )   PT: 13.6 sec;   INR: 1.14 ratio           ABG - ( 24 Aug 2021 00:38 )  pH, Arterial: 7.42  pH, Blood: x     /  pCO2: 48    /  pO2: 80    / HCO3: 31    / Base Excess: 5.8   /  SaO2: 97.7                ======================Micro/Rad/Cardio=================  Culture: Reviewed   CXR: Reviewed  Echo:Reviewed  ======================================================  PAST MEDICAL & SURGICAL HISTORY:  CHF (congestive heart failure)    CAD (coronary artery disease)    Depression    Pleural effusion    History of  novel coronavirus disease (COVID-19)  2020    Hemorrhoids    Bleeding hemorrhoids    Peripheral arterial disease    Claudication    BPH with urinary obstruction    ACC/AHA stage D systolic heart failure    Anticoagulation goal of INR 2.0 to 2.5    Falls    Clavicle fracture    CKD (chronic kidney disease), stage III    Iron deficiency anemia    H/O epistaxis    Vertigo    GI bleed    S/P TVR (tricuspid valve repair)    S/P ventricular assist device    S/P endoscopy      ====================ASSESSMENT ==============  Stage D Nonischemic Cardiomyopathy, Status Post HM2 on 2017    Cardiogenic shock  Hemodynamic instability   Acute hypoxemic respiratory failure  GI bleed , Status Post Enteroscopy   Anemia, in setting of melena   Chronic Kidney Disease  Stress hyperglycemia   C.diff positive on    Hypovolemic shock  Septic shock  Leukocytosis    Plan:  ====================== NEUROLOGY=====================  Nonfocal, continue to monitor neuro status   Tylenol PRN for analgesia   C/w mirtazapine and sertraline for depression  PT/Ambulate as tolerated    acetaminophen    Suspension .. 650 milliGRAM(s) Oral every 6 hours PRN Mild Pain (1 - 3)  mirtazapine 7.5 milliGRAM(s) Oral daily  sertraline 50 milliGRAM(s) Oral daily    ==================== RESPIRATORY======================  Acute hypoxemic respiratory failure s/p #8 shiley trach on ; patient put back on assist control on  2/2 worsening of clinical condition, continue TC as tolerated (TC since )  Continue close monitoring of respiratory rate and breathing pattern, following of ABG’s with A-line monitoring, continuous pulse oximetry monitoring.   Suction secretions prn     ====================CARDIOVASCULAR==================  Stage D Nonischemic Cardiomyopathy, Status Post HM2 on 2017; LVAD settings 9200, flow 5.3  TTE : reveals at least moderate MR, mild AI, severe global LV syst dysfxn, dec RV fxn   Continue invasive hemodynamic monitoring   Propranolol for rate control of HR 2/2 Hyperthyroidism, titrate as tolerated    propranolol 20 milliGRAM(s) Oral every 8 hours    ===================HEMATOLOGIC/ONC ===================  Acute blood loss anemia, monitor H&H/Plts    Continue monitor LDH daily     VTE prophylaxis, heparin   Injectable 5000 Unit(s) SubCutaneous every 8 hours    ===================== RENAL =========================  Continue to monitor I/Os, BUN/Creatinine, and urine output.   Goal net negative fluid balance. Replete lytes PRN. Keep K> 4 and Mg >2.   Plan to start diuresis with IV Lasix 10mg    ==================== GASTROINTESTINAL===================  S/p cholecystostomy tube placed on : with IR with -60cc of black bile, will monitor site closely.   Recent emesis on  in setting of abdominal pain, as per GI likely component of illeus given critical illness   CTA A/P : Evaluation of the mesenteric vessels is limited by streak artifact from LVAD. There appears to be severe stenosis of the proximal SMA; abdominal mesenteric doppler is recommended for further evaluation. 2.  No small bowel findings to suggest acute mesenteric ischemia. 3.  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  Vascular surgery and IR consulted for potential SMA stent. Both teams state that SMA stent is not warranted as they don't believe SMA stenosis is as severe on CTA as read by radiology and do not believe that this stenosis is causing patient's intolerance to TFs  Mesenteric duplex on 8/15 borderline stenosis of proximal SMA   Tolerating TwoCal HN feeds @ goal 45cc/hr   Zofran PRN for nausea   Reglan for gut motility     multivitamin 1 Tablet(s) Oral daily  GI prophylaxis, pantoprazole  Injectable 40 milliGRAM(s) IV Push every 12 hours  thiamine 100 milliGRAM(s) Oral daily  metoclopramide Injectable 10 milliGRAM(s) IV Push every 8 hours  Vancomycin  PO for c.dif prophylaxisliGRAM(s) IV Push every 6 hours PRN Nausea and/or Vomiting    =======================    ENDOCRINE  =====================  Stress hyperglycemia, continue glucose control with admelog sliding scale   Thyroid US to evaluate for thyroid nodule/goiter in setting of hyperthyroid  - contraindicated at this time 2/2 trach/will do when trach is out   TSH persistently low, Free T4 low. c/w Methimazole     insulin lispro (ADMELOG) corrective regimen sliding scale   SubCutaneous every 6 hours  methimazole 20 milliGRAM(s) Oral daily    ========================INFECTIOUS DISEASE================  Afebrile, WBC rising 13.76->15.65  Monitor temperature and trend WBC.   BC 1/2 +GPC in clusters, SCx+ enterobacter(CRE). C/w cefepime.  Vancomycin  PO for C.diff prophylaxis   Start inhaled tobramycin for increased sections      cefepime   IVPB 1000 milliGRAM(s) IV Intermittent every 8 hours  vancomycin    Solution 125 milliGRAM(s) Oral every 6 hours      Patient requires continuous monitoring with bedside rhythm monitoring, pulse ox monitoring, and intermittent blood gas analysis. Care plan discussed with ICU care team. Patient remained critical and at risk for life threatening decompensation.     By signing my name below, I, Agnieszka Cherry, attest that this documentation has been prepared under the direction and in the presence of Jaclyn Mendoza NP   Electronically signed: Cesia Shaffer, 21 @ 08:11    I, Jaclyn Mendoza, personally performed the services described in this documentation. all medical record entries made by the luisibmel were at my direction and in my presence. I have reviewed the chart and agree that the record reflects my personal performance and is accurate and complete  Electronically signed: Jaclyn Mendoza NP        RICKY HAMPTON  MRN-[id number]  Patient is a 66y old  Male who presents with a chief complaint of Anemia, Supratherapeutic INR, Dark Stools (24 Aug 2021 07:47)    HPI:  65M PMH ACC/AHA stage D HF due to NICM HM2 LVAD , TV annuloplasty ring 17 as destination therapy due to severe peripheral artery disease with significant stenosis  SIADH, Depression, CKD-3 with hyperkalemia, past E. coli UTIs, driveline drainage (21) and COVID-19 (back in 2020)  He was recently seen in clinic where he complained of abdominal pain and dark stools w constipation back in May. He presents to [facility] ER today weakness and fatigue, moderate and + Black stools for three days, on coumadin secondary to warfarin use in the setting of an LVAD. Patient has required transfusions for GIB in the past. Mostly recently back in 2021 pt had anemia with dark stools. No interventions was done at that time. However Last Endoscopy was done in 2020 (negative). Today labs show patient is anemic with H/H of 4.5/16.3,. INR is 8.84 MAP in the 90s, Temp 35.1. He denies any chest pain, shortness of breath, dizziness, abd pain, nausea or vomiting.       (2021 16:57)      Surgery/Hospital Course:   admit for melena w/ anemia, INR 8.84   6/15 Capsul study (+) for small bowel bleed, balloon endoscopy (old blood in prox ileum); post EGD - septic w/ L opacity, re-intubated for concern for aspiration, TTE (Mod MR, decrease biV w/ interventricular septum boweing towards R)   bronch    +C Diff    TC    CT C/A/P: Fluid filled colon which may be 2/2 rapid transit. Small bilateral pleffs with associates. Compressive atelectasis New ISABELLE & LLL  parenchymal opacities, suspicious for pneumonia. 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    #8 Shiley trach at bedside    CPAP trials    LVAD speed increased to 9200   Bronch; Central line dc'ed   TC since , continue as tolerated. Patient transferred to SDU.    Cont PT, no trach downsize today(#8cuffed)/ Anticoagulation/ Continue TF, speech f/u/ Vanco taper    oob to chair  INR  2.47  5 mg coumadin     increased lop to 25 q8  per HF   INR today 2.64.  H&H 7.3 this AM.  Will repeat CBC at noon, and will send stool guaiac Patient with persistent abdominal tenderness, rate of tube feeds decreased.  No nausea/vomiting.     INR today 2.4H&H 9.1.6 low flow overnight /N&V  NPO resolved for capsule study  Coumadin on hold refusing Tube feeds on D5 1/2 normal  @50 cc/hr   INR 2.69  H&H 7..1 refusing Tube feeds on D5 1/2 normal  @50 cc/hr. This am + BM Anusha Oneill HF  aware- PRBC x1  GI team consulted -  NPO  plan on study in am-  D/w Dr Cadet Patient  to return to CTU for further management; 1PRBCS    Post op INR 2.2 today.  No bleeding. BC + for SM.  Pt is hypotensive requiring pressor and inotropic support.  ID follow up today on Cefepime will follow.   R PTC for PTX    CT C/A/P: sub q emphysema in R chest wall, GGO RUL, small ascites CTH negative; Abd US: GB thickening, pericholecystic fluid     Perchole drain in place continues to drain total output overnight 133.  Fever today 38.8 given tylenol.  Will repeat BC now.     duplex LE negative    Patient with persistent abdominal pain, refusing tube feeds and medications, Psych consulted   CTA A/P ordered to r/o mesenteric ischemia 2/2 persistent anorexia, nausea, vomiting. Revealed:  Evaluation of the mesenteric vessels is limited by streak artifact from LVAD. There appears to be severe stenosis of the proximal SMA; abdominal mesenteric doppler is recommended for further evaluation. 2.  No small bowel findings to suggest acute mesenteric ischemia. 3.  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  8/15: Cultures resulted BC 1/2 +GPC in clusters, SC enterobacter; mesenteric duplex: borderline stenosis of proximal SMA     Today:  Plan to start diuresis with IV Lasix 10mg. Start inhaled tobramycin for increased sections. Ambulate as tolerated. Will consult house endocrine re: management of hyperthyroid.     REVIEW OF SYSTEMS:  Patient speaks over trach   Gen: No fever  EYES/ENT: No visual changes;  No vertigo or throat pain   NECK: No pain   RES:  No shortness of breath or Cough  CARD: No chest pain   GI: No abdominal pain  : No dysuria  NEURO: No weakness  SKIN: No itching, rashes     ICU Vital Signs Last 24 Hrs  T(C): 36.9 (24 Aug 2021 08:00), Max: 37.7 (23 Aug 2021 16:00)  T(F): 98.5 (24 Aug 2021 08:00), Max: 99.9 (23 Aug 2021 16:00)  HR: 104 (24 Aug 2021 08:00) (101 - 126)  BP: --  BP(mean): 80 (24 Aug 2021 00:00) (78 - 80)  ABP: 103/72 (24 Aug 2021 08:00) (87/59 - 116/81)  ABP(mean): 85 (24 Aug 2021 08:00) (71 - 96)  RR: 15 (24 Aug 2021 08:00) (15 - 50)  SpO2: 100% (24 Aug 2021 08:00) (91% - 100%)      Physical Exam:  Gen:  Sitting in chair in NAD. Awake and alert, NAD  Psych: Withdrawn, depressed mood.  CNS:  intact, nonfocal, responds to all commands  Neck: no JVD, +TC   RES : course breath sounds, no wheezing              CVS: +LVAD hum   Abd: Soft, minimally-moderately tender in RUQ by perc dawn site, NT everywhere else, positive BS throughout. Perc dawn drain site c/d/i draining bilious fluid.  Skin: No rash  Ext:  no edema    ============================I/O===========================   I&O's Detail    23 Aug 2021 07:01  -  24 Aug 2021 07:00  --------------------------------------------------------  IN:    Enteral Tube Flush: 400 mL    IV PiggyBack: 200 mL    Miscellaneous Tube Feedin mL  Total IN: 1635 mL    OUT:    Drain (mL): 305 mL    Voided (mL): 640 mL  Total OUT: 945 mL    Total NET: 690 mL      24 Aug 2021 07:01  -  24 Aug 2021 08:11  --------------------------------------------------------  IN:    Miscellaneous Tube Feedin mL  Total IN: 45 mL    OUT:    Voided (mL): 100 mL  Total OUT: 100 mL    Total NET: -55 mL        ============================ LABS =========================                        10.2   21.33 )-----------( 290      ( 24 Aug 2021 00:47 )             32.3         129<L>  |  91<L>  |  17  ----------------------------<  174<H>  4.5   |  26  |  0.51    Ca    10.1      24 Aug 2021 00:47  Phos  3.6       Mg     1.8         TPro  8.8<H>  /  Alb  3.5  /  TBili  0.4  /  DBili  x   /  AST  30  /  ALT  35  /  AlkPhos  250<H>      LIVER FUNCTIONS - ( 24 Aug 2021 00:47 )  Alb: 3.5 g/dL / Pro: 8.8 g/dL / ALK PHOS: 250 U/L / ALT: 35 U/L / AST: 30 U/L / GGT: x           PT/INR - ( 23 Aug 2021 00:37 )   PT: 13.6 sec;   INR: 1.14 ratio           ABG - ( 24 Aug 2021 00:38 )  pH, Arterial: 7.42  pH, Blood: x     /  pCO2: 48    /  pO2: 80    / HCO3: 31    / Base Excess: 5.8   /  SaO2: 97.7                ======================Micro/Rad/Cardio=================  Culture: Reviewed   CXR: Reviewed  Echo:Reviewed  ======================================================  PAST MEDICAL & SURGICAL HISTORY:  CHF (congestive heart failure)    CAD (coronary artery disease)    Depression    Pleural effusion    History of  novel coronavirus disease (COVID-19)  2020    Hemorrhoids    Bleeding hemorrhoids    Peripheral arterial disease    Claudication    BPH with urinary obstruction    ACC/AHA stage D systolic heart failure    Anticoagulation goal of INR 2.0 to 2.5    Falls    Clavicle fracture    CKD (chronic kidney disease), stage III    Iron deficiency anemia    H/O epistaxis    Vertigo    GI bleed    S/P TVR (tricuspid valve repair)    S/P ventricular assist device    S/P endoscopy      ====================ASSESSMENT ==============  Stage D Nonischemic Cardiomyopathy, Status Post HM2 on 2017    Cardiogenic shock  Hemodynamic instability   Acute hypoxemic respiratory failure  GI bleed , Status Post Enteroscopy   Anemia, in setting of melena   Chronic Kidney Disease  Stress hyperglycemia   C.diff positive on    Hypovolemic shock  Septic shock  Leukocytosis    Plan:  ====================== NEUROLOGY=====================  Nonfocal, continue to monitor neuro status   Tylenol PRN for analgesia   C/w mirtazapine and sertraline for depression  PT/Ambulate as tolerated    acetaminophen    Suspension .. 650 milliGRAM(s) Oral every 6 hours PRN Mild Pain (1 - 3)  mirtazapine 7.5 milliGRAM(s) Oral daily  sertraline 50 milliGRAM(s) Oral daily    ==================== RESPIRATORY======================  Acute hypoxemic respiratory failure s/p #8 betzaida trach on ; patient put back on assist control on  2/2 worsening of clinical condition, continue TC as tolerated (TC since )  Continue close monitoring of respiratory rate and breathing pattern, following of ABG’s with A-line monitoring, continuous pulse oximetry monitoring.   Suction secretions prn     ====================CARDIOVASCULAR==================  Stage D Nonischemic Cardiomyopathy, Status Post HM2 on 2017; LVAD settings 9200, flow 5.3  TTE : reveals at least moderate MR, mild AI, severe global LV syst dysfxn, dec RV fxn   Continue invasive hemodynamic monitoring   Propranolol for rate control of HR 2/2 Hyperthyroidism, titrate as tolerated    propranolol 20 milliGRAM(s) Oral every 8 hours    ===================HEMATOLOGIC/ONC ===================  Acute blood loss anemia, monitor H&H/Plts    Continue monitor LDH daily     VTE prophylaxis, heparin   Injectable 5000 Unit(s) SubCutaneous every 8 hours    ===================== RENAL =========================  Continue to monitor I/Os, BUN/Creatinine, and urine output.   Goal net negative fluid balance. Replete lytes PRN. Keep K> 4 and Mg >2.   Plan to start diuresis with IV Lasix 10mg    ==================== GASTROINTESTINAL===================  S/p cholecystostomy tube placed on : with IR with -60cc of black bile, will monitor site closely.   Recent emesis on  in setting of abdominal pain, as per GI likely component of illeus given critical illness   CTA A/P : Evaluation of the mesenteric vessels is limited by streak artifact from LVAD. There appears to be severe stenosis of the proximal SMA; abdominal mesenteric doppler is recommended for further evaluation. 2.  No small bowel findings to suggest acute mesenteric ischemia. 3.  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  Vascular surgery and IR consulted for potential SMA stent. Both teams state that SMA stent is not warranted as they don't believe SMA stenosis is as severe on CTA as read by radiology and do not believe that this stenosis is causing patient's intolerance to TFs  Mesenteric duplex on 8/15 borderline stenosis of proximal SMA   Tolerating TwoCal HN feeds @ goal 45cc/hr   Zofran PRN for nausea   Reglan for gut motility     multivitamin 1 Tablet(s) Oral daily  GI prophylaxis, pantoprazole  Injectable 40 milliGRAM(s) IV Push every 12 hours  thiamine 100 milliGRAM(s) Oral daily  metoclopramide Injectable 10 milliGRAM(s) IV Push every 8 hours  Vancomycin  PO for c.dif prophylaxisliGRAM(s) IV Push every 6 hours PRN Nausea and/or Vomiting    =======================    ENDOCRINE  =====================  Stress hyperglycemia, continue glucose control with admelog sliding scale   Thyroid US to evaluate for thyroid nodule/goiter in setting of hyperthyroid  - contraindicated at this time 2/2 trach/will do when trach is out  Hyperthyroidism, c/w Methimazole   Will consult house endocrine re: management of hyperthyroid    insulin lispro (ADMELOG) corrective regimen sliding scale   SubCutaneous every 6 hours  methimazole 20 milliGRAM(s) Oral daily    ========================INFECTIOUS DISEASE================  Afebrile, WBC rising 13.76->15.65  Monitor temperature and trend WBC.   BC 1/2 +GPC in clusters, SCx+ enterobacter(CRE). C/w cefepime.  Vancomycin  PO for C.diff prophylaxis   Start inhaled tobramycin for increased sections      cefepime   IVPB 1000 milliGRAM(s) IV Intermittent every 8 hours  vancomycin    Solution 125 milliGRAM(s) Oral every 6 hours      Patient requires continuous monitoring with bedside rhythm monitoring, pulse ox monitoring, and intermittent blood gas analysis. Care plan discussed with ICU care team. Patient remained critical and at risk for life threatening decompensation.     By signing my name below, I, Agnieszka Cherry, attest that this documentation has been prepared under the direction and in the presence of Jaclyn Mendoza NP   Electronically signed: Romel Shaffer, - @ 08:11    I, Jaclyn Mendoza, personally performed the services described in this documentation. all medical record entries made by the romel were at my direction and in my presence. I have reviewed the chart and agree that the record reflects my personal performance and is accurate and complete  Electronically signed: Jaclyn Mendoza NP

## 2021-08-24 NOTE — PROGRESS NOTE ADULT - SUBJECTIVE AND OBJECTIVE BOX
Chief complaint  Patient is a 66y old  Male who presents with a chief complaint of Anemia, Supratherapeutic INR, Dark Stools (24 Aug 2021 13:27)   Review of systems  Patient in bed, looks comfortable, no hypoglycemic episodes.    Labs and Fingersticks  CAPILLARY BLOOD GLUCOSE      POCT Blood Glucose.: 156 mg/dL (24 Aug 2021 11:25)  POCT Blood Glucose.: 161 mg/dL (24 Aug 2021 05:03)  POCT Blood Glucose.: 187 mg/dL (24 Aug 2021 00:28)  POCT Blood Glucose.: 129 mg/dL (23 Aug 2021 17:06)      Medications  MEDICATIONS  (STANDING):  cefepime   IVPB      cefepime   IVPB 1000 milliGRAM(s) IV Intermittent every 8 hours  chlorhexidine 2% Cloths 1 Application(s) Topical <User Schedule>  heparin   Injectable 5000 Unit(s) SubCutaneous every 8 hours  insulin lispro (ADMELOG) corrective regimen sliding scale   SubCutaneous every 6 hours  methimazole 20 milliGRAM(s) Oral daily  metoclopramide Injectable 10 milliGRAM(s) IV Push every 8 hours  mirtazapine 7.5 milliGRAM(s) Oral daily  multivitamin 1 Tablet(s) Oral daily  pantoprazole  Injectable 40 milliGRAM(s) IV Push every 12 hours  propranolol 30 milliGRAM(s) Oral every 8 hours  sertraline 50 milliGRAM(s) Oral daily  thiamine 100 milliGRAM(s) Oral daily  tobramycin for Nebulization 300 milliGRAM(s) Inhalation every 12 hours  vancomycin    Solution 125 milliGRAM(s) Oral every 6 hours      Physical Exam  Culture - Sputum (collected 08-23-21 @ 17:32)  Source: .Sputum Sputum  Gram Stain (08-23-21 @ 21:41):    Numerous polymorphonuclear leukocytes per low power field    Few Squamous epithelial cells per low power field    Few Gram Negative Rods per oil power field    Rare Gram positive cocci in pairs per oil power field      General: Patient comfortable in bed  Vital Signs Last 12 Hrs  T(F): 98.2 (08-24-21 @ 14:00), Max: 99.7 (08-24-21 @ 04:00)  HR: 122 (08-24-21 @ 15:00) (102 - 128)  BP: --  BP(mean): --  RR: 44 (08-24-21 @ 15:00) (15 - 50)  SpO2: 96% (08-24-21 @ 15:00) (96% - 100%)      Diagnostics    US Thyroid: Routine   Indication: hyperthyroidism  Transport: Stret[facility]af,  w/ Monitor  Provider's Contact #: [phone] (08-08 @ 16:13)  TSH Receptor Antibody: AM Sched. Collection: 09-Aug-2021 06:00 (08-08 @ 13:31)  Thyroperoxidase Antibody: AM Sched. Collection: 09-Aug-2021 06:00 (08-08 @ 13:31)           Chief complaint  Patient is a 66y old  Male who presents with a chief complaint of Anemia, Supratherapeutic INR, Dark Stools (24 Aug 2021 13:27)   Review of systems  Patient in bed, looks comfortable, no hypoglycemic episodes.    Labs and Fingersticks  CAPILLARY BLOOD GLUCOSE      POCT Blood Glucose.: 156 mg/dL (24 Aug 2021 11:25)  POCT Blood Glucose.: 161 mg/dL (24 Aug 2021 05:03)  POCT Blood Glucose.: 187 mg/dL (24 Aug 2021 00:28)  POCT Blood Glucose.: 129 mg/dL (23 Aug 2021 17:06)      Medications  MEDICATIONS  (STANDING):  cefepime   IVPB      cefepime   IVPB 1000 milliGRAM(s) IV Intermittent every 8 hours  chlorhexidine 2% Cloths 1 Application(s) Topical <User Schedule>  heparin   Injectable 5000 Unit(s) SubCutaneous every 8 hours  insulin lispro (ADMELOG) corrective regimen sliding scale   SubCutaneous every 6 hours  methimazole 20 milliGRAM(s) Oral daily  metoclopramide Injectable 10 milliGRAM(s) IV Push every 8 hours  mirtazapine 7.5 milliGRAM(s) Oral daily  multivitamin 1 Tablet(s) Oral daily  pantoprazole  Injectable 40 milliGRAM(s) IV Push every 12 hours  propranolol 30 milliGRAM(s) Oral every 8 hours  sertraline 50 milliGRAM(s) Oral daily  thiamine 100 milliGRAM(s) Oral daily  tobramycin for Nebulization 300 milliGRAM(s) Inhalation every 12 hours  vancomycin    Solution 125 milliGRAM(s) Oral every 6 hours      Physical Exam  Culture - Sputum (collected 08-23-21 @ 17:32)  Source: .Sputum Sputum  Gram Stain (08-23-21 @ 21:41):    Numerous polymorphonuclear leukocytes per low power field    Few Squamous epithelial cells per low power field    Few Gram Negative Rods per oil power field    Rare Gram positive cocci in pairs per oil power field      General: Patient comfortable in bed  Vital Signs Last 12 Hrs  T(F): 98.2 (08-24-21 @ 14:00), Max: 99.7 (08-24-21 @ 04:00)  HR: 122 (08-24-21 @ 15:00) (102 - 128)  BP: --  BP(mean): --  RR: 44 (08-24-21 @ 15:00) (15 - 50)  SpO2: 96% (08-24-21 @ 15:00) (96% - 100%)      Diagnostics    US Thyroid: Routine   Indication: hyperthyroidism  Transport: Stret[facility],  w/ Monitor  Provider's Contact #: [phone] (08-08 @ 16:13)  TSH Receptor Antibody: AM Sched. Collection: 09-Aug-2021 06:00 (08-08 @ 13:31)  Thyroperoxidase Antibody: AM Sched. Collection: 09-Aug-2021 06:00 (08-08 @ 13:31)

## 2021-08-24 NOTE — PROGRESS NOTE ADULT - PROBLEM SELECTOR PLAN 1
Thyroid US pending 2/2 trach. Suggest to continue medications for now; Must monitor LFTs closely while on Methimazole.  We will be signing off on the care of this patient. Please do not hesitate to contact us with any further questions or concerns. Thank you. Thyroid US pending 2/2 trach. Suggest to continue medications for now; Must monitor LFTs closely while on Methimazole.  CT team wants house endo to FU patient. We will be signing off on the care of this patient. Please do not hesitate to contact us with any further questions or concerns. Thank you.

## 2021-08-24 NOTE — PROGRESS NOTE ADULT - ASSESSMENT
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 coli UTIs, driveline drainage (1/7/21) and COVID-19 (back in April 2020)  He was recently seen in clinic where he complained of abdominal pain and dark stools w constipation back in May. He presents to [facility] ER today weakness and fatigue, moderate and + Black stools for three days, on coumadin secondary to warfarin use in the setting of an LVAD. Patient has required transfusions for GIB in the past. Mostly recently back in jan 2021 pt had anemia with dark stools. No interventions was done at that time. However Last Endoscopy was done in July 2020 (negative). Today labs show patient is anemic with H/H of 4.5/16.3,. INR is 8.84 MAP in the 90s,   Returned from endoscopy appearing ill tachypneic with chills with new white out of his left lung    Remains with complaints of abdominal pain  leukocytosis  increasing tan secretions from trach site-  follow up sputum culture results- CXR NAD but may be developing a tracheobronchitis        Morris Prater MD  [phone]  After 5pm/weekends [phone]

## 2021-08-24 NOTE — PROGRESS NOTE ADULT - ASSESSMENT
Assessment  Hyperthyroidism: 66y Male with no history thyroid disease, was not on any thyroid supplements, previously euthyroid (June 2021), was on amiodarone (last dose 8/7), now in hyperthyroid state, Hashimotos, thyroid US contraindicated 2/2 trach. Patient started on Methimazole and propanolol for tachycardia, remains hyperthyroid and tachycardic, increased Methimazole and Propanolol doses, LFTs being monitored, Palliative Care on board for goals of care.  Hyperglycemia: Patient in nondiabetic range, A1C 5.6%, now on insulin coverage, blood sugars in acceptable range, no hypoglycemias, on TFs.  CHF: on medications, stable, monitored.  Anemia: Monitored      Pavan Barone MD  Cell: [phone]  Office: [phone]       Assessment  Hyperthyroidism: 66y Male with no history thyroid disease, was not on any thyroid supplements, previously euthyroid (June 2021), was on amiodarone (last dose 8/7), now in hyperthyroid state, Hashimotos, thyroid US contraindicated 2/2 trach. Patient started on Methimazole and propanolol for tachycardia, remains hyperthyroid and tachycardic, LFTs being monitored, Palliative Care on board for goals of care.  Hyperglycemia: Patient in nondiabetic range, A1C 5.6%, now on insulin coverage, blood sugars in acceptable range, no hypoglycemias, on TFs.  CHF: on medications, stable, monitored.  Anemia: Monitored      Pavan Barone MD  Cell: [phone]  Office: [phone]

## 2021-08-24 NOTE — PROGRESS NOTE ADULT - SUBJECTIVE AND OBJECTIVE BOX
[facility] NUTRITION SUPPORT-- FOLLOW UP NOTE  --------------------------------------------------------------------------------  24 hour events/subjective: pt seen and examined. resting in bed. continues to endorse abd discomfort. tolerating tf at 45. +gi fxn.  with increased trach secretions and sputum sent for gram stain/cx. uptrending leukocytosis.    Diet:  Diet, NPO with Tube Feed:   Tube Feeding Modality: Nasogastric  TwoCal HN (TWOCALHNRTH)  Total Volume for 24 Hours (mL): 1080  Continuous  Starting Tube Feed Rate mL per Hour: 10  Increase Tube Feed Rate by (mL): 10     Every 6 hours  Until Goal Tube Feed Rate (mL per Hour): 45  Tube Feed Duration (in Hours): 24  Tube Feed Start Time: 13:25 (21 @ 13:37)      PAST HISTORY  --------------------------------------------------------------------------------  No significant changes to PMH, PSH, FHx, SHx, unless otherwise noted    ALLERGIES & MEDICATIONS  --------------------------------------------------------------------------------  Allergies    No Known Allergies    Intolerances      Standing Inpatient Medications  cefepime   IVPB      cefepime   IVPB 1000 milliGRAM(s) IV Intermittent every 8 hours  chlorhexidine 2% Cloths 1 Application(s) Topical <User Schedule>  heparin   Injectable 5000 Unit(s) SubCutaneous every 8 hours  insulin lispro (ADMELOG) corrective regimen sliding scale   SubCutaneous every 6 hours  methimazole 20 milliGRAM(s) Oral daily  metoclopramide Injectable 10 milliGRAM(s) IV Push every 8 hours  mirtazapine 7.5 milliGRAM(s) Oral daily  multivitamin 1 Tablet(s) Oral daily  pantoprazole  Injectable 40 milliGRAM(s) IV Push every 12 hours  propranolol 20 milliGRAM(s) Oral every 8 hours  sertraline 50 milliGRAM(s) Oral daily  thiamine 100 milliGRAM(s) Oral daily  vancomycin    Solution 125 milliGRAM(s) Oral every 6 hours    PRN Inpatient Medications  acetaminophen    Suspension .. 650 milliGRAM(s) Oral every 6 hours PRN  ondansetron Injectable 4 milliGRAM(s) IV Push every 6 hours PRN        REVIEW OF SYSTEMS  --------------------------------------------------------------------------------  General:  as per hpi  HEENT:  no headaches, no vision changes, no ear pain  CV:  no chest pain, no palpitations  Resp:  see hpi  GI:  as per hpi  :  no pain, no bleeding, no dysuria  Muscle:  no pain, no weakness  Neuro:  no numbness, no tingling, no memory problems  Psych:  no insomnia  Endocrine:  no cold/heat intolerance  Heme: no ecchymosis, no easy bruising  Skin:  no rash, no edema    all other systems were reviewed and are negative, except as noted         VITALS/PHYSICAL EXAM  --------------------------------------------------------------------------------  T(C): 37.6 (21 @ 04:00), Max: 37.7 (21 @ 16:00)  HR: 102 (21 @ 07:00) (100 - 126)  BP: --  RR: 30 (21 @ 07:00) (18 - 50)  SpO2: 100% (21 @ 07:00) (91% - 100%)  Wt(kg): --      21 @ 07:01  -  21 @ 07:00  --------------------------------------------------------  IN: 1635 mL / OUT: 945 mL / NET: 690 mL    21 @ 07:01  -  21 @ 07:47  --------------------------------------------------------  IN: 45 mL / OUT: 0 mL / NET: 45 mL      I&O's Detail    23 Aug 2021 07:  -  24 Aug 2021 07:00  --------------------------------------------------------  IN:    Enteral Tube Flush: 400 mL    IV PiggyBack: 200 mL    Miscellaneous Tube Feedin mL  Total IN: 1635 mL    OUT:    Drain (mL): 305 mL    Voided (mL): 640 mL  Total OUT: 945 mL    Total NET: 690 mL      24 Aug 2021 07:  -  24 Aug 2021 07:47  --------------------------------------------------------  IN:    Miscellaneous Tube Feedin mL  Total IN: 45 mL    OUT:  Total OUT: 0 mL    Total NET: 45 mL      Physical Exam:  Gen: lying in bed  HEENT: +trach +ngt   Chest: respirations non labored  Abd: soft nd perc c-tube in place   LE: no edema  Neuro: awake alert responsive    LABS/STUDIES  --------------------------------------------------------------------------------              10.2   21.33 >-----------<  290      [21 00:47]              32.3     129  |  91  |  17  ----------------------------<  174      [21 00:47]  4.5   |  26  |  0.51        Ca     10.1     [21 00:47]      Mg     1.8     [21 00:47]      Phos  3.6     [21 00:47]    TPro  8.8  /  Alb  3.5  /  TBili  0.4  /  DBili  x   /  AST  30  /  ALT  35  /  AlkPhos  250  [21 00:47]    PT/INR: PT 13.6 , INR 1.14       [21 @ 00:37]          [21 00:47]    Ca ionizedBlood Gas Arterial - Calcium, Ionized: 1.29 mmol/L (21 @ 00:38)  Blood Gas Arterial - Calcium, Ionized: 1.27 mmol/L (21 @ 00:22)    Creatinine Trend:  POC glucoseGlucose, Serum: 174 mg/dL (21 00:47)  CAPILLARY BLOOD GLUCOSE      POCT Blood Glucose.: 161 mg/dL (24 Aug 2021 05:03)  POCT Blood Glucose.: 187 mg/dL (24 Aug 2021 00:28)  POCT Blood Glucose.: 129 mg/dL (23 Aug 2021 17:06)  POCT Blood Glucose.: 166 mg/dL (23 Aug 2021 12:56)    PrealbuminPrealbumin, Serum: 11 mg/dL (21 @ 04:01)  Prealbumin, Serum: 10 mg/dL (08-15-21 @ 13:53)    Triglycerides

## 2021-08-24 NOTE — PROGRESS NOTE ADULT - SUBJECTIVE AND OBJECTIVE BOX
INFECTIOUS DISEASES FOLLOW UP-- Anitra Prater  [phone]    This is a follow up note for this  66yMale with  Anemia    Acute cholecystitis    increasing sputum production    ROS:  CONSTITUTIONAL:  No fever, good appetite  CARDIOVASCULAR:  No chest pain or palpitations  RESPIRATORY:  No dyspnea  GASTROINTESTINAL:  No nausea, vomiting, diarrhea, or abdominal pain  GENITOURINARY:  No dysuria  NEUROLOGIC:  No headache,     Allergies    No Known Allergies    Intolerances        ANTIBIOTICS/RELEVANT:  antimicrobials  cefepime   IVPB      cefepime   IVPB 1000 milliGRAM(s) IV Intermittent every 8 hours  tobramycin for Nebulization 300 milliGRAM(s) Inhalation every 12 hours  vancomycin    Solution 125 milliGRAM(s) Oral every 6 hours    immunologic:    OTHER:  acetaminophen    Suspension .. 650 milliGRAM(s) Oral every 6 hours PRN  chlorhexidine 2% Cloths 1 Application(s) Topical <User Schedule>  heparin   Injectable 5000 Unit(s) SubCutaneous every 8 hours  insulin lispro (ADMELOG) corrective regimen sliding scale   SubCutaneous every 6 hours  methimazole 20 milliGRAM(s) Oral daily  metoclopramide Injectable 10 milliGRAM(s) IV Push every 8 hours  mirtazapine 7.5 milliGRAM(s) Oral daily  multivitamin 1 Tablet(s) Oral daily  ondansetron Injectable 4 milliGRAM(s) IV Push every 6 hours PRN  pantoprazole  Injectable 40 milliGRAM(s) IV Push every 12 hours  propranolol 30 milliGRAM(s) Oral every 8 hours  sertraline 50 milliGRAM(s) Oral daily  thiamine 100 milliGRAM(s) Oral daily      Objective:  Vital Signs Last 24 Hrs  T(C): 36.8 (24 Aug 2021 14:00), Max: 37.7 (23 Aug 2021 20:00)  T(F): 98.2 (24 Aug 2021 14:00), Max: 99.8 (23 Aug 2021 20:00)  HR: 116 (24 Aug 2021 18:00) (102 - 128)  BP: --  BP(mean): 80 (24 Aug 2021 00:00) (78 - 80)  RR: 31 (24 Aug 2021 18:00) (15 - 50)  SpO2: 100% (24 Aug 2021 18:00) (91% - 100%)    PHYSICAL EXAM:  Constitutional:no acute distress  Eyes:ALONSO, EOMI  Ear/Nose/Throat: no oral lesions, 	  Respiratory: clear BL  Cardiovascular: S1S2  Gastrointestinal:soft, (+) BS, no tenderness  Extremities:no e/e/c  No Lymphadenopathy  IV sites not inflammed.    LABS:                        10.2   21.33 )-----------( 290      ( 24 Aug 2021 00:47 )             32.3     08-24    129<L>  |  91<L>  |  17  ----------------------------<  174<H>  4.5   |  26  |  0.51    Ca    10.1      24 Aug 2021 00:47  Phos  3.6     08-24  Mg     1.8     08-24    TPro  8.8<H>  /  Alb  3.5  /  TBili  0.4  /  DBili  x   /  AST  30  /  ALT  35  /  AlkPhos  250<H>  08-24    PT/INR - ( 23 Aug 2021 00:37 )   PT: 13.6 sec;   INR: 1.14 ratio               MICROBIOLOGY:            RECENT CULTURES:  08-23 @ 17:32  .Sputum Sputum  --  --  --  --  --  08-20 @ 23:18  .Blood Blood-Peripheral  --  --  --    No growth to date.  --      RADIOLOGY & ADDITIONAL STUDIES:    < from: Xray Chest 1 View- PORTABLE-Routine (Xray Chest 1 View- PORTABLE-Routine in AM.) (08.24.21 @ 02:53) >    The heart is slightly enlarged. The lungs appear to be clear. A tracheostomy tube is in good position. NG tube is in the stomach however its tip is not seen on the current study. A left assisted device is present. A loop recorder is overlying the left hemithorax. Status post sternotomy.    < end of copied text >

## 2021-08-24 NOTE — PROGRESS NOTE ADULT - SUBJECTIVE AND OBJECTIVE BOX
RICKY JOINT  MRN#: [id number]  Subjective:  Pulmonary progress  : recurrent Acute hypoxic respiratory Failure ,aspiration pneumonia, NICM  , chart reviewed and H/O obtained radiological and Laboratory study reviewed patient Examined     65M PMH ACC/AHA stage D HF due to NICM HM2 LVAD , TV annuloplasty ring 17 as destination therapy due to severe peripheral artery disease with significant stenosis  SIADH, Depression, CKD-3 with hyperkalemia, past E. coli UTIs, driveline drainage (21) and COVID-19 (back in 2020)  He was recently seen in clinic where he complained of abdominal pain and dark stools w constipation back in May. He presents to [facility] ER today weakness and fatigue, moderate and + Black stools for three days, on coumadin secondary to warfarin use in the setting of an LVAD. Patient has required transfusions for GIB in the past. Mostly recently back in 2021 pt had anemia with dark stools. No interventions was done at that time. However Last Endoscopy was done in 2020 (negative). Today labs show patient is anemic with H/H of 4.5/16.3,. INR is 8.84 MAP in the 90s, Temp 35.1. He denies any chest pain, shortness of breath, dizziness, abd pain, nausea or vomiting. found to have  rectal bleeding underwent endoscopy ,old blood in the proximal ileum ,  develop sepsis with LL opacity given Antibiotics , Extubated , reintubated , Bronchoscopy on Zosyn for LL pneumonia  and Amiodarone S/P TV Annuloplasty , patient remain intubated on full ventilatory support .S/P multiple units of blood transfusion , remain on full ventilatory support on Precedex and propofol , new central IJ line , diarrhea C diff. +ve on po vancomycin and IV Flagyl,  mildly distended belly , fever start on cefepime 2gm q 8 hrs S/P tracheostomy .  new RT Subclavian central line continue on contact  isolation ,still diarrhea on C-diff antibiotics ENT follow up appreciated , trial of C-PAP as tolerated , , copious secretion from trach. site chest x ray left lower lobe pneumonia , tolerating trch. collar 50% FI02 still excessive secretion need pulmonary toilet , off Ancef antibiotic , no more diarrhea back on full support mechanical ventilator , chest x ray show improvement in LLL air space disease, more awake and responsive on tube feeding no more diarrhea , S/P  Ancef for bacteremia no fever ,ID follow up noted ,  no nausea or vomiting or diarrhea still very weak and tired , event note pulled NG tube now replaced , back on tube feeding ,still on po vancomycin , getting PT and OT at bed side , no plan for decannulation for now. , no more diarrhea receiving PT and OPT at bed side , minimal secretion from tracheostomy site , no SOB getting stronger , improve muscle tone patient transfer to monitor bed still on contact isolation for C-Difficel colitis on 50% FI02, NG tube clogged , and change to resume tube feeding still loose stool . H/H drop significantly require blood transfusion , most likely GI bleeding , IV heparin D/C , vomiting 200 cc of creamy color tube feeding on hold no sob, still melena monitor in the CTU possible capsule endoscopy , H/H is stable ., patient develop TR sided  pneumothorax require chest tube placement , RT IJ central line  placed , develop fever shaking chills , blood culture positive for serratia marcescens , start on cefepime .the patient  become hypoxic and hypotensive placed on full ventilatory support and Vasopressin , levophed and Dobutamine ,S/P blood transfusion on meropenem and vancomycin ,   , on and  off pressors , occasional agitation on Precedex .S/P IR cholecystostomy tube drainage placement in the RT upper Quadrant , resume anticoagulation chest x ray noted C-PAP trail lasted only for 2 hrs , new RT SC line and D/C RT IJ line , RT pig tail cathter has been removed , tolerating C-PAP trial placed on trach. collar 50% FI02 GI consultant noted on NG tube feeding as tolerated , develop AF RVR S/P  bolus Amiodarone  back to regular sinus Rhythm , Flat Affect depressed , back on tube feeding Vital AF at 60 cc/ hr .still intermittent abdominal pain , no fever saturation is accepted  back on full ventilatory support , tired and sleepy on round   Dark stool evaluated by Vascular service recommend mesenteric Duplex. new Rt IJ line , C-PAP trial  wean off ventilator  to trah.collar 50% FI02,  vascular mesenteric Dopplex noted no evidence of severe  mesenteric arteries thrombosis .start  back on  tube feeding . tolerating it very well , no nausea or vomiting , good 02 saturation on trac-collar on methimazole for hyperthyroidism , no new C/O        (2021 16:57)    PAST MEDICAL & SURGICAL HISTORY:  CHF (congestive heart failure)    CAD (coronary artery disease)  Depression    Pleural effusion    History of 2019 novel coronavirus disease (COVID-19)  2020    Hemorrhoids    Bleeding hemorrhoids    Peripheral arterial disease    Claudication    BPH with urinary obstruction    ACC/AHA stage D systolic heart failure  Anticoagulation goal of INR 2.0 to 2.5    Falls    Clavicle fracture    CKD (chronic kidney disease), stage III    Iron deficiency anemia    H/O epistaxis    Vertigo    GI bleed    S/P TVR (tricuspid valve repair)    S/P ventricular assist device    S/P endoscopy    OBJECTIVE:  ICU Vital Signs Last 24 Hrs  T(C): 38.2 (2021 10:00), Max: 38.5 (2021 12:00)  T(F): 100.8 (2021 10:00), Max: 101.3 (2021 12:00)  HR: 65 (2021 10:00) (61 - 69)  BP: --  BP(mean): --  ABP: 105/67 (2021 10:00) (90/54 - 113/64)  ABP(mean): 77 (2021 10:00) (63 - 77)  RR: 20 (2021 10:00) (19 - 35)  SpO2: 99% (2021 10:00) (96% - 100%)       @ 07: @ 07:00  --------------------------------------------------------  IN: 2693.9 mL / OUT: 1415 mL / NET: 1278.9 mL     @ 07: @ 10:49  --------------------------------------------------------  IN: 420.8 mL / OUT: 115 mL / NET: 305.8 mL  PHYSICAL EXAM:Daily   Elderly male S/P tracheostomy on trach. collar 50% FI02 ,  Daily Weight in k.4 (2021 00:00)  HEENT:     + NCAT  + EOMI  - Conjuctival edema   - Icterus   - Thrush   - ETT  + NGT/OGT  Neck:         + FROM  RT IJ  line JVD  - Nodes - Masses + Mid-line trachea + Tracheostomy  Chest:            normal A-P diameter    Lungs:          + CTA   + Rhonchi    - Rales    - Wheezing + Decreased  LT BS   - Dullness R L  Cardiac:       + S1 + S2    + RRR   - Irregular   - S3  - S4    - Murmurs   - Rub   - Hamman’s sign   Abdomen:    + BS  + Soft + Non-tender - Distended - Organomegaly - PEG .cholecystostomy tube in place  Extremities:   - Cyanosis U/L   - Clubbing  U/L  + LE/UE Edema   + Capillary refill    + Pulses   Neuro:        - Awake   -  Alert   - Confused   - Lethargic   + Sedated  + Generalized Weakness  Skin:        - Rashes    - Erythema   + Normal incisions   + IV sites intact          HOSPITAL MEDICATIONS: All medications reviewed and analyzed  MEDICATIONS  (STANDING):  amiodarone    Tablet 200 milliGRAM(s) Oral daily  chlorhexidine 0.12% Liquid 15 milliLiter(s) Oral Mucosa every 12 hours  chlorhexidine 2% Cloths 1 Application(s) Topical <User Schedule>  dexmedetomidine Infusion 0.5 MICROgram(s)/kG/Hr (9.81 mL/Hr) IV Continuous <Continuous>  dextrose 50% Injectable 50 milliLiter(s) IV Push every 15 minutes  heparin  Infusion 400 Unit(s)/Hr (12.5 mL/Hr) IV Continuous <Continuous>  Hydromorphone  Injectable 0.5 milliGRAM(s) IV Push once  insulin lispro (ADMELOG) corrective regimen sliding scale   SubCutaneous every 6 hours  pantoprazole  Injectable 40 milliGRAM(s) IV Push every 12 hours  piperacillin/tazobactam IVPB.. 3.375 Gram(s) IV Intermittent every 8 hours  propofol Infusion 20 MICROgram(s)/kG/Min (9.42 mL/Hr) IV Continuous <Continuous>  sodium chloride 0.9% lock flush 3 milliLiter(s) IV Push every 8 hours  sodium chloride 0.9%. 1000 milliLiter(s) (10 mL/Hr) IV Continuous <Continuous>    MEDICATIONS  (PRN):  acetaminophen    Suspension .. 650 milliGRAM(s) Oral every 6 hours PRN Temp greater or equal to 38C (100.4F)    LABS: All Lab data reviewed and analyzed                                   10.2   33 )-----------( 290      ( 24 Aug 2021 00:47 )             32.3   08-24    129<L>  |  91<L>  |  17  ----------------------------<  174<H>  4.5   |  26  |  0.51    Ca    10.1      24 Aug 2021 00:47  Phos  3.6     08-24  Mg     1.8     -    TPro  8.8<H>  /  Alb  3.5  /  TBili  0.4  /  DBili  x   /  AST  30  /  ALT  35  /  AlkPhos  250<H>  08    Ca    9.8      23 Aug 2021 00:36  Phos  3.2     08-23  Mg     1.8     -    TPro  8.4<H>  /  Alb  3.3  /  TBili  0.3  /  DBili  x   /  AST  26  /  ALT  35  /  AlkPhos  217<H>        Ca    9.8      21 Aug 2021 00:37  Phos  2.9     08-21  Mg     1.7     -    TPro  8.1  /  Alb  3.3  /  TBili  0.4  /  DBili  x   /  AST  20  /  ALT  34  /  AlkPhos  205<H>      Ca    10.2      20 Aug 2021 00:37  Phos  3.5     08-20  Mg     1.6     -    TPro  8.2  /  Alb  3.4  /  TBili  0.6  /  DBili  x   /  AST  22  /  ALT  37  /  AlkPhos  190<H>                                                                                                 PTT - ( 2021 04:52 )  PTT:45.2 sec LIVER FUNCTIONS - ( 2021 00:42 )  Alb: 3.4 g/dL / Pro: 6.7 g/dL / ALK PHOS: 213 U/L / ALT: 15 U/L / AST: 24 U/L / GGT: x           RADIOLOGY: - Reviewed and analyzed RT Pig tail cathter  , LVAD HM2, CT scan of abdomen reviewed result noted

## 2021-08-24 NOTE — PROGRESS NOTE ADULT - ASSESSMENT
Assessment and Recommendation:   · Assessment	  Assessment and recommendation :  Recurrent Acute hypoxic respiratory Failure S/P tracheostomy on trach. collar  50% FI02,   Acute blood loss anemia S/P multiple  blood transfusion   S/P septic shock off vasopressin , levophed and off  Dobutamine   S/P cholecystostomy tube placement by IR    AF RVR back to regular sinus Rhythm   Non ischemic cardiomyopathy continue ACE inhibitor and B-Blockers   S/P Septic shock and cardiogenic shock   Stage D systolic heart failure S/P LVAD HM2   MH2 LVAD  with  TV Annuloplasty  hyponatremia fluid restriction   Severe peripheral vascular disease   no evidence of significant mesenteric thrombosis   severe hyperglycemia on insulin coverage    hyperthyroidism on Methimazole 10mg TID   critical care polyneuropathy   Anemia of Acute blood Loss   severe protein caloric malnutrition   S/P Small bowel bleeding   S/P blood and FFP transfusion   Chronic kidney disease stage III  resume  NGT feeding .tolerating it well   GI prophylaxis with PPI

## 2021-08-25 NOTE — CONSULT NOTE ADULT - SUBJECTIVE AND OBJECTIVE BOX
HPI:  65 year old male with history of STage D HF due to NICM on LVAD,  TV annuloplasty ring 9/12/17 as destination therapy due to severe peripheral artery disease with significant stenosis  SIADH, Depression, CKD-3 with hyperkalemia, past E. coli UTIs, driveline drainage (1/7/21) and COVID-19 (back in April 2020) admitted intially for melena and supra-therapeutic INR in setting of warfarin use.   Patient has required transfusions for GIB in the past. He had a complicated hospital stay ultimately requiring trach placement and per-dawn drain.   Acute hypoxemic respiratory failure s/p #8 shiley trach on 6/25, continue TC as tolerated (TC since 8/17)  Stage D Nonischemic Cardiomyopathy, Status Post HM2 on 9/2017  TTE 7/26: reveals at least moderate MR, mild AI, severe global LV syst dysfxn, dec RV fxn   SMA stents were not indicated in this case     Hyperthyroidism   -Previously was being followed by Dr. Barone.   -No previous hyperthyroid history   -Inpatient noted to have initially mild hyperthyroidism which has now increased to TSH 0.01, T3: 343 and T4: 24.9  -He had remained on methimazole 20 mg daily and propanolol 30 mg q 8 H   -LFTs at this time are being monitored and have worsened today slightly   -TPO ab was mildly positive   -TSI and TRAB ab have been negative   -Thyroid US could not be performed due to trach placement   -Amiodarone was discontinued on 0807          PAST MEDICAL & SURGICAL HISTORY:  CHF (congestive heart failure)  CAD (coronary artery disease)  Depression  Pleural effusion  History of 2019 novel coronavirus disease (COVID-19)  april 2020  Hemorrhoids  Bleeding hemorrhoids  Peripheral arterial disease  Claudication  BPH with urinary obstruction  ACC/AHA stage D systolic heart failure  Anticoagulation goal of INR 2.0 to 2.5  Falls  Clavicle fracture    CKD (chronic kidney disease), stage III    Iron deficiency anemia    H/O epistaxis    Vertigo    GI bleed    S/P TVR (tricuspid valve repair)    S/P ventricular assist device    S/P endoscopy        FAMILY HISTORY:      Social History:    Outpatient Medications:    MEDICATIONS  (STANDING):  cefepime   IVPB      cefepime   IVPB 1000 milliGRAM(s) IV Intermittent every 8 hours  chlorhexidine 2% Cloths 1 Application(s) Topical <User Schedule>  heparin   Injectable 5000 Unit(s) SubCutaneous every 8 hours  insulin lispro (ADMELOG) corrective regimen sliding scale   SubCutaneous every 6 hours  losartan 25 milliGRAM(s) Oral daily  methimazole 20 milliGRAM(s) Oral daily  metoclopramide Injectable 10 milliGRAM(s) IV Push every 8 hours  mirtazapine 7.5 milliGRAM(s) Oral daily  multivitamin 1 Tablet(s) Oral daily  pantoprazole  Injectable 40 milliGRAM(s) IV Push every 12 hours  propranolol 30 milliGRAM(s) Oral every 8 hours  sertraline 50 milliGRAM(s) Oral daily  thiamine 100 milliGRAM(s) Oral daily  tobramycin for Nebulization 300 milliGRAM(s) Inhalation every 12 hours  vancomycin    Solution 125 milliGRAM(s) Oral every 6 hours    MEDICATIONS  (PRN):  acetaminophen    Suspension .. 650 milliGRAM(s) Oral every 6 hours PRN Mild Pain (1 - 3)  ondansetron Injectable 4 milliGRAM(s) IV Push every 6 hours PRN Nausea and/or Vomiting      Allergies    No Known Allergies    Intolerances      Review of Systems:  Constitutional: No fever  Eyes: No blurry vision  Neuro: No tremors  HEENT: No pain  Cardiovascular: No chest pain, palpitations  Respiratory: No SOB, no cough  GI: No nausea, vomiting, abdominal pain  : No dysuria  Skin: no rash  Psych: no depression  Endocrine: no polyuria, polydipsia  Hem/lymph: no swelling  Osteoporosis: no fractures    ALL OTHER SYSTEMS REVIEWED AND NEGATIVE    UNABLE TO OBTAIN    PHYSICAL EXAM:  VITALS: T(C): 36.2 (08-25-21 @ 16:00)  T(F): 97.2 (08-25-21 @ 16:00), Max: 99.5 (08-25-21 @ 08:00)  HR: 107 (08-25-21 @ 16:00) (107 - 135)  BP: --  RR:  (15 - 54)  SpO2:  (98% - 100%)  Wt(kg): --  GENERAL: Trached no acute distress   EYES: No proptosis, no lid lag, anicteric  HEENT:  Atraumatic, Normocephalic, moist mucous membranes  RESPIRATORY: Clear to auscultation bilaterally  CARDIOVASCULAR: + tachycardia  GI: Soft, nontender, non distended, normal bowel sounds  MUSCULOSKELETAL: Full range of motion, normal strength  NEURO: sensation intact, extraocular movements intact, no tremor, normal reflexes  PSYCH: Alert and oriented x 3, normal affect, normal mood    POCT Blood Glucose.: 229 mg/dL (08-25-21 @ 12:14)  POCT Blood Glucose.: 251 mg/dL (08-25-21 @ 06:35)  POCT Blood Glucose.: 202 mg/dL (08-24-21 @ 17:27)  POCT Blood Glucose.: 156 mg/dL (08-24-21 @ 11:25)  POCT Blood Glucose.: 161 mg/dL (08-24-21 @ 05:03)  POCT Blood Glucose.: 187 mg/dL (08-24-21 @ 00:28)  POCT Blood Glucose.: 129 mg/dL (08-23-21 @ 17:06)  POCT Blood Glucose.: 166 mg/dL (08-23-21 @ 12:56)  POCT Blood Glucose.: 154 mg/dL (08-23-21 @ 05:43)  POCT Blood Glucose.: 176 mg/dL (08-23-21 @ 00:42)  POCT Blood Glucose.: 120 mg/dL (08-22-21 @ 17:27)                            9.8    23.47 )-----------( 332      ( 25 Aug 2021 00:59 )             30.6       08-25    129<L>  |  90<L>  |  25<H>  ----------------------------<  147<H>  4.6   |  28  |  0.61    EGFR if : 121  EGFR if non : 105    Ca    11.0<H>      08-25  Mg     2.0     08-25  Phos  4.5     08-25    TPro  9.6<H>  /  Alb  3.8  /  TBili  0.6  /  DBili  x   /  AST  48<H>  /  ALT  53<H>  /  AlkPhos  316<H>  08-25      Thyroid Function Tests:  08-25 @ 03:32 TSH <0.01 FreeT4 -- T3 343 Anti TPO -- Anti Thyroglobulin Ab -- TSI --  08-22 @ 11:57 TSH -- FreeT4 >7.8 T3 -- Anti TPO -- Anti Thyroglobulin Ab -- TSI --          08-15 Chol 153 Direct LDL -- LDL calculated 91 HDL 34<L> Trig 141, 07-28 Chol -- Direct LDL -- LDL calculated -- HDL -- Trig 679<H>, 06-27 Chol -- Direct LDL -- LDL calculated -- HDL -- Trig 145, 06-26 Chol -- Direct LDL -- LDL calculated -- HDL -- Trig 191<H>, 06-25 Chol -- Direct LDL -- LDL calculated -- HDL -- Trig 183<H>, 06-22 Chol -- Direct LDL -- LDL calculated -- HDL -- Trig 518<H>                 HPI:  65 year old male with history of STage D HF due to NICM on LVAD,  TV annuloplasty ring 9/12/17 as destination therapy due to severe peripheral artery disease with significant stenosis  SIADH, Depression, CKD-3 with hyperkalemia, past E. coli UTIs, driveline drainage (1/7/21) and COVID-19 (back in April 2020) admitted intially for melena and supra-therapeutic INR in setting of warfarin use.   Patient has required transfusions for GIB in the past. He had a complicated hospital stay ultimately requiring trach placement and per-dawn drain.   Acute hypoxemic respiratory failure s/p #8 shiley trach on 6/25, continue TC as tolerated (TC since 8/17)  Stage D Nonischemic Cardiomyopathy, Status Post HM2 on 9/2017  TTE 7/26: reveals at least moderate MR, mild AI, severe global LV syst dysfxn, dec RV fxn   SMA stents were not indicated in this case     Hyperthyroidism   -Previously was being followed by Dr. Barone.   -No previous hyperthyroid history   -Inpatient noted to have initially mild hyperthyroidism which has now increased to TSH 0.01, T3: 343 and T4: 24.9  -He had remained on methimazole 20 mg daily and propanolol 30 mg q 8 H   -LFTs at this time are being monitored and have worsened today slightly   -TPO ab was mildly positive   -TSI and TRAB ab have been negative   -Thyroid US could not be performed due to trach placement   -Amiodarone was discontinued on 08/07          PAST MEDICAL & SURGICAL HISTORY:  CHF (congestive heart failure)  CAD (coronary artery disease)  Depression  Pleural effusion  History of 2019 novel coronavirus disease (COVID-19)  april 2020  Hemorrhoids  Bleeding hemorrhoids  Peripheral arterial disease  Claudication  BPH with urinary obstruction  ACC/AHA stage D systolic heart failure  Anticoagulation goal of INR 2.0 to 2.5  Falls  Clavicle fracture    CKD (chronic kidney disease), stage III    Iron deficiency anemia    H/O epistaxis    Vertigo    GI bleed    S/P TVR (tricuspid valve repair)    S/P ventricular assist device    S/P endoscopy        FAMILY HISTORY:      Social History:    Outpatient Medications:    MEDICATIONS  (STANDING):  cefepime   IVPB      cefepime   IVPB 1000 milliGRAM(s) IV Intermittent every 8 hours  chlorhexidine 2% Cloths 1 Application(s) Topical <User Schedule>  heparin   Injectable 5000 Unit(s) SubCutaneous every 8 hours  insulin lispro (ADMELOG) corrective regimen sliding scale   SubCutaneous every 6 hours  losartan 25 milliGRAM(s) Oral daily  methimazole 20 milliGRAM(s) Oral daily  metoclopramide Injectable 10 milliGRAM(s) IV Push every 8 hours  mirtazapine 7.5 milliGRAM(s) Oral daily  multivitamin 1 Tablet(s) Oral daily  pantoprazole  Injectable 40 milliGRAM(s) IV Push every 12 hours  propranolol 30 milliGRAM(s) Oral every 8 hours  sertraline 50 milliGRAM(s) Oral daily  thiamine 100 milliGRAM(s) Oral daily  tobramycin for Nebulization 300 milliGRAM(s) Inhalation every 12 hours  vancomycin    Solution 125 milliGRAM(s) Oral every 6 hours    MEDICATIONS  (PRN):  acetaminophen    Suspension .. 650 milliGRAM(s) Oral every 6 hours PRN Mild Pain (1 - 3)  ondansetron Injectable 4 milliGRAM(s) IV Push every 6 hours PRN Nausea and/or Vomiting      Allergies    No Known Allergies    Intolerances      Review of Systems:  Constitutional: No fever  Eyes: No blurry vision  Neuro: No tremors  HEENT: No pain  Cardiovascular: No chest pain, palpitations  Respiratory: No SOB, no cough  GI: No nausea, vomiting, abdominal pain  : No dysuria  Skin: no rash  Psych: no depression  Endocrine: no polyuria, polydipsia  Hem/lymph: no swelling  Osteoporosis: no fractures    ALL OTHER SYSTEMS REVIEWED AND NEGATIVE    UNABLE TO OBTAIN    PHYSICAL EXAM:  VITALS: T(C): 36.2 (08-25-21 @ 16:00)  T(F): 97.2 (08-25-21 @ 16:00), Max: 99.5 (08-25-21 @ 08:00)  HR: 107 (08-25-21 @ 16:00) (107 - 135)  BP: --  RR:  (15 - 54)  SpO2:  (98% - 100%)  Wt(kg): --  GENERAL: Trached no acute distress   EYES: No proptosis, no lid lag, anicteric  HEENT:  Atraumatic, Normocephalic, moist mucous membranes +Trach  RESPIRATORY: Clear to auscultation bilaterally  CARDIOVASCULAR: + tachycardia  GI: Soft, nontender, non distended, normal bowel sounds +Percutaneous drain  MUSCULOSKELETAL: Full range of motion, normal strength  NEURO: sensation intact, extraocular movements intact, no tremor, normal reflexes  PSYCH: Alert and oriented x 3, normal affect, normal mood    POCT Blood Glucose.: 229 mg/dL (08-25-21 @ 12:14)  POCT Blood Glucose.: 251 mg/dL (08-25-21 @ 06:35)  POCT Blood Glucose.: 202 mg/dL (08-24-21 @ 17:27)  POCT Blood Glucose.: 156 mg/dL (08-24-21 @ 11:25)  POCT Blood Glucose.: 161 mg/dL (08-24-21 @ 05:03)  POCT Blood Glucose.: 187 mg/dL (08-24-21 @ 00:28)  POCT Blood Glucose.: 129 mg/dL (08-23-21 @ 17:06)  POCT Blood Glucose.: 166 mg/dL (08-23-21 @ 12:56)  POCT Blood Glucose.: 154 mg/dL (08-23-21 @ 05:43)  POCT Blood Glucose.: 176 mg/dL (08-23-21 @ 00:42)  POCT Blood Glucose.: 120 mg/dL (08-22-21 @ 17:27)                            9.8    23.47 )-----------( 332      ( 25 Aug 2021 00:59 )             30.6       08-25    129<L>  |  90<L>  |  25<H>  ----------------------------<  147<H>  4.6   |  28  |  0.61    EGFR if : 121  EGFR if non : 105    Ca    11.0<H>      08-25  Mg     2.0     08-25  Phos  4.5     08-25    TPro  9.6<H>  /  Alb  3.8  /  TBili  0.6  /  DBili  x   /  AST  48<H>  /  ALT  53<H>  /  AlkPhos  316<H>  08-25      Thyroid Function Tests:  08-25 @ 03:32 TSH <0.01 FreeT4 -- T3 343 Anti TPO -- Anti Thyroglobulin Ab -- TSI --  08-22 @ 11:57 TSH -- FreeT4 >7.8 T3 -- Anti TPO -- Anti Thyroglobulin Ab -- TSI --          08-15 Chol 153 Direct LDL -- LDL calculated 91 HDL 34<L> Trig 141, 07-28 Chol -- Direct LDL -- LDL calculated -- HDL -- Trig 679<H>, 06-27 Chol -- Direct LDL -- LDL calculated -- HDL -- Trig 145, 06-26 Chol -- Direct LDL -- LDL calculated -- HDL -- Trig 191<H>, 06-25 Chol -- Direct LDL -- LDL calculated -- HDL -- Trig 183<H>, 06-22 Chol -- Direct LDL -- LDL calculated -- HDL -- Trig 518<H>

## 2021-08-25 NOTE — PROGRESS NOTE ADULT - ASSESSMENT
Assessment and Recommendation:   · Assessment	  Assessment and recommendation :  Recurrent Acute hypoxic respiratory Failure S/P tracheostomy on trach. collar  50% FI02,   Acute blood loss anemia S/P multiple  blood transfusion   S/P septic shock off vasopressin , levophed and off  Dobutamine   S/P cholecystostomy tube placement by IR    AF RVR back to regular sinus Rhythm   Non ischemic cardiomyopathy continue ACE inhibitor and B-Blockers   S/P Septic shock and cardiogenic shock   Stage D systolic heart failure S/P LVAD HM2   MH2 LVAD  with  TV Annuloplasty  hyponatremia fluid restriction   Severe peripheral vascular disease   no evidence of significant mesenteric thrombosis   severe hyperglycemia on insulin coverage    hyperthyroidism on Methimazole 10mg TID   critical care polyneuropathy   Anemia of Acute blood Loss   severe protein caloric malnutrition   S/P Small bowel bleeding   S/P blood and FFP transfusion   Chronic kidney disease stage III  resume  NGT feeding .tolerating it well   Decannulation ?  GI prophylaxis with PPI

## 2021-08-25 NOTE — PROGRESS NOTE ADULT - SUBJECTIVE AND OBJECTIVE BOX
INFECTIOUS DISEASES FOLLOW UP-- Anitra Prater  [phone]    This is a follow up note for this  66yMale with  Anemia    Acute cholecystitis    increasing leukocytosis        ROS:  CONSTITUTIONAL:  No fever,   CARDIOVASCULAR:  No chest pain or palpitations  RESPIRATORY:  No dyspnea  GASTROINTESTINAL:  No nausea, vomiting, diarrhea, c/o abdominal pain with feedings  GENITOURINARY:  No dysuria  NEUROLOGIC:  No headache,     Allergies    No Known Allergies    Intolerances        ANTIBIOTICS/RELEVANT:  antimicrobials  cefepime   IVPB      cefepime   IVPB 1000 milliGRAM(s) IV Intermittent every 8 hours  tobramycin for Nebulization 300 milliGRAM(s) Inhalation every 12 hours  vancomycin    Solution 125 milliGRAM(s) Oral every 6 hours    immunologic:    OTHER:  acetaminophen    Suspension .. 650 milliGRAM(s) Oral every 6 hours PRN  chlorhexidine 2% Cloths 1 Application(s) Topical <User Schedule>  dextrose 40% Gel 15 Gram(s) Oral once  dextrose 5%. 1000 milliLiter(s) IV Continuous <Continuous>  dextrose 5%. 1000 milliLiter(s) IV Continuous <Continuous>  dextrose 50% Injectable 25 Gram(s) IV Push once  dextrose 50% Injectable 12.5 Gram(s) IV Push once  dextrose 50% Injectable 25 Gram(s) IV Push once  glucagon  Injectable 1 milliGRAM(s) IntraMuscular once  heparin   Injectable 5000 Unit(s) SubCutaneous every 8 hours  insulin lispro (ADMELOG) corrective regimen sliding scale   SubCutaneous every 6 hours  insulin NPH human recombinant 4 Unit(s) SubCutaneous every 6 hours  losartan 25 milliGRAM(s) Oral daily  methimazole 15 milliGRAM(s) Oral daily  metoclopramide Injectable 10 milliGRAM(s) IV Push every 8 hours  mirtazapine 7.5 milliGRAM(s) Oral daily  multivitamin 1 Tablet(s) Oral daily  ondansetron Injectable 4 milliGRAM(s) IV Push every 6 hours PRN  pantoprazole  Injectable 40 milliGRAM(s) IV Push every 12 hours  predniSONE  Solution 40 milliGRAM(s) Oral daily  propranolol 30 milliGRAM(s) Oral every 8 hours  sertraline 50 milliGRAM(s) Oral daily  thiamine 100 milliGRAM(s) Oral daily      Objective:  Vital Signs Last 24 Hrs  T(C): 36.2 (25 Aug 2021 16:00), Max: 37.5 (25 Aug 2021 08:00)  T(F): 97.2 (25 Aug 2021 16:00), Max: 99.5 (25 Aug 2021 08:00)  HR: 114 (25 Aug 2021 19:00) (107 - 135)  BP: --  BP(mean): --  RR: 30 (25 Aug 2021 19:00) (15 - 54)  SpO2: 99% (25 Aug 2021 19:00) (98% - 100%)    PHYSICAL EXAM:  Constitutional:no acute distress  Eyes:ALONSO, EOMI  Ear/Nose/Throat: no oral lesions, trach site no erythema	  Respiratory: clear BL- audible anteriorly  Cardiovascular: [id number] sounds  Gastrointestinal:soft, (+) BS, no tenderness  cholecystotomy tube drainage decreasing amount  Extremities:no e/e/c  No Lymphadenopathy  IV sites not inflammed.    LABS:                        9.8    23.47 )-----------( 332      ( 25 Aug 2021 00:59 )             30.6     08-25    129<L>  |  90<L>  |  25<H>  ----------------------------<  147<H>  4.6   |  28  |  0.61    Ca    11.0<H>      25 Aug 2021 00:59  Phos  4.5     08-25  Mg     2.0     08-25    TPro  9.6<H>  /  Alb  3.8  /  TBili  0.6  /  DBili  x   /  AST  48<H>  /  ALT  53<H>  /  AlkPhos  316<H>  08-25          MICROBIOLOGY:            RECENT CULTURES:  08-23 @ 17:32  .Sputum Sputum  --  --  --    Normal Respiratory Bria present  --  08-20 @ 23:18  .Blood Blood-Peripheral  --  --  --    No growth to date.  --      RADIOLOGY & ADDITIONAL STUDIES:    < from: Xray Chest 1 View- PORTABLE-Routine (Xray Chest 1 View- PORTABLE-Routine in AM.) (08.25.21 @ 02:17) >  The heart is slightly enlarged. Left costophrenic angle is not included in this study. The right lung is clear. A tracheostomy tube is in good position. NG tube is in the stomach however its tip is not seen on the current study. A loop recorder is seen in the left lower hemithorax. Status post sternotomy.    < end of copied text >

## 2021-08-25 NOTE — PROGRESS NOTE ADULT - SUBJECTIVE AND OBJECTIVE BOX
[facility] NUTRITION SUPPORT-- FOLLOW UP NOTE  --------------------------------------------------------------------------------    24 hour events/subjective: pt seen and examined. sp lasix 10mg and 20mg yesterday. propranolol dose increased. seen by house endo. yesterday tf held intermittently due to nausea. overnight tf held after episode of vomiting, received reglan, thereafter tf restarted; goal increased to 50cc/hr as per  recs. pt c/o lower abd pain this am, passing gas, denies nausea.  this am, wbc uptrending.    Diet:  Diet, NPO with Tube Feed:   Tube Feeding Modality: Nasogastric  TwoCal HN (TWOCALHNRTH)  Total Volume for 24 Hours (mL): 1200  Continuous  Starting Tube Feed Rate mL per Hour: 50     Every 6 hours  Until Goal Tube Feed Rate (mL per Hour): 50  Tube Feed Duration (in Hours): 24  Tube Feed Start Time: 15:30 (21 @ 15:39)      PAST HISTORY  --------------------------------------------------------------------------------  No significant changes to PMH, PSH, FHx, SHx, unless otherwise noted    ALLERGIES & MEDICATIONS  --------------------------------------------------------------------------------  Allergies    No Known Allergies    Intolerances      Standing Inpatient Medications  cefepime   IVPB      cefepime   IVPB 1000 milliGRAM(s) IV Intermittent every 8 hours  chlorhexidine 2% Cloths 1 Application(s) Topical <User Schedule>  heparin   Injectable 5000 Unit(s) SubCutaneous every 8 hours  insulin lispro (ADMELOG) corrective regimen sliding scale   SubCutaneous every 6 hours  methimazole 20 milliGRAM(s) Oral daily  metoclopramide Injectable 10 milliGRAM(s) IV Push every 8 hours  mirtazapine 7.5 milliGRAM(s) Oral daily  multivitamin 1 Tablet(s) Oral daily  pantoprazole  Injectable 40 milliGRAM(s) IV Push every 12 hours  propranolol 30 milliGRAM(s) Oral every 8 hours  sertraline 50 milliGRAM(s) Oral daily  thiamine 100 milliGRAM(s) Oral daily  tobramycin for Nebulization 300 milliGRAM(s) Inhalation every 12 hours  vancomycin    Solution 125 milliGRAM(s) Oral every 6 hours    PRN Inpatient Medications  acetaminophen    Suspension .. 650 milliGRAM(s) Oral every 6 hours PRN  ondansetron Injectable 4 milliGRAM(s) IV Push every 6 hours PRN        REVIEW OF SYSTEMS  --------------------------------------------------------------------------------  General:  as per hpi  HEENT:  no headaches, no vision changes, no ear pain  CV:  no chest pain, no palpitations  Resp:  see hpi  GI:  as per hpi  :  no pain, no bleeding, no dysuria  Muscle:  no pain, no weakness  Neuro:  no numbness, no tingling, no memory problems  Psych:  no insomnia  Endocrine:  no cold/heat intolerance  Heme: no ecchymosis, no easy bruising  Skin:  no rash, no edema    all other systems were reviewed and are negative, except as noted       VITALS/PHYSICAL EXAM  --------------------------------------------------------------------------------  T(C): 37.5 (21 @ 08:00), Max: 37.5 (21 @ 08:00)  HR: 117 (21 @ 08:00) (109 - 132)  BP: --  RR: 37 (21 @ 08:00) (15 - 47)  SpO2: 99% (21 @ 08:00) (95% - 100%)  Wt(kg): --      21 @ 07:01  -  21 @ 07:00  --------------------------------------------------------  IN: 1160 mL / OUT: 1310 mL / NET: -150 mL      I&O's Detail    24 Aug 2021 07:01  -  25 Aug 2021 07:00  --------------------------------------------------------  IN:    IV PiggyBack: 50 mL    Miscellaneous Tube Feedin mL  Total IN: 1160 mL    OUT:    Drain (mL): 160 mL    Voided (mL): 1150 mL  Total OUT: 1310 mL    Total NET: -150 mL        Physical Exam:  Gen: lying in bed  HEENT: +trach +ngt   Chest: respirations non labored  Abd: soft generalized ttp nd perc c-tube in place   LE: no edema  Neuro: awake alert responsive      LABS/STUDIES  --------------------------------------------------------------------------------              9.8    23.47 >-----------<  332      [21 @ 00:59]              30.6     129  |  90  |  25  ----------------------------<  147      [21 @ 00:59]  4.6   |  28  |  0.61        Ca     11.0     [21 @ 00:59]      Mg     2.0     [21 @ 00:59]      Phos  4.5     [21 00:59]    TPro  9.6  /  Alb  3.8  /  TBili  0.6  /  DBili  x   /  AST  48  /  ALT  53  /  AlkPhos  316  [21 @ 00:59]              [21 @ 00:59]    Ca ionizedBlood Gas Arterial - Calcium, Ionized: 1.27 mmol/L (21 @ 00:39)  Blood Gas Arterial - Calcium, Ionized: 1.28 mmol/L (21 @ 11:38)    Creatinine Trend:  POC glucoseGlucose, Serum: 147 mg/dL (21 @ 00:59)  CAPILLARY BLOOD GLUCOSE      POCT Blood Glucose.: 251 mg/dL (25 Aug 2021 06:35)  POCT Blood Glucose.: 202 mg/dL (24 Aug 2021 17:27)  POCT Blood Glucose.: 156 mg/dL (24 Aug 2021 11:25)    PrealbuminPrealbumin, Serum: 11 mg/dL (21 @ 04:01)  Prealbumin, Serum: 10 mg/dL (08-15-21 @ 13:53)    Triglycerides     [facility] NUTRITION SUPPORT-- FOLLOW UP NOTE  --------------------------------------------------------------------------------    24 hour events/subjective: pt seen and examined. sp lasix 10mg and 20mg yesterday. propranolol dose increased. seen by house endo. yesterday tf held intermittently due to nausea. overnight tf held after episode of vomiting, received reglan, thereafter tf restarted; goal increased to 50cc/hr as per  recs and probiotic yogurt smoothie added bid. pt c/o lower abd pain this am, passing gas, denies nausea.  this am, wbc uptrending.    Diet:  Diet, NPO with Tube Feed:   Tube Feeding Modality: Nasogastric  TwoCal HN (TWOCALHNRTH)  Total Volume for 24 Hours (mL): 1200  Continuous  Starting Tube Feed Rate mL per Hour: 50     Every 6 hours  Until Goal Tube Feed Rate (mL per Hour): 50  Tube Feed Duration (in Hours): 24  Tube Feed Start Time: 15:30 (21 @ 15:39)      PAST HISTORY  --------------------------------------------------------------------------------  No significant changes to PMH, PSH, FHx, SHx, unless otherwise noted    ALLERGIES & MEDICATIONS  --------------------------------------------------------------------------------  Allergies    No Known Allergies    Intolerances      Standing Inpatient Medications  cefepime   IVPB      cefepime   IVPB 1000 milliGRAM(s) IV Intermittent every 8 hours  chlorhexidine 2% Cloths 1 Application(s) Topical <User Schedule>  heparin   Injectable 5000 Unit(s) SubCutaneous every 8 hours  insulin lispro (ADMELOG) corrective regimen sliding scale   SubCutaneous every 6 hours  methimazole 20 milliGRAM(s) Oral daily  metoclopramide Injectable 10 milliGRAM(s) IV Push every 8 hours  mirtazapine 7.5 milliGRAM(s) Oral daily  multivitamin 1 Tablet(s) Oral daily  pantoprazole  Injectable 40 milliGRAM(s) IV Push every 12 hours  propranolol 30 milliGRAM(s) Oral every 8 hours  sertraline 50 milliGRAM(s) Oral daily  thiamine 100 milliGRAM(s) Oral daily  tobramycin for Nebulization 300 milliGRAM(s) Inhalation every 12 hours  vancomycin    Solution 125 milliGRAM(s) Oral every 6 hours    PRN Inpatient Medications  acetaminophen    Suspension .. 650 milliGRAM(s) Oral every 6 hours PRN  ondansetron Injectable 4 milliGRAM(s) IV Push every 6 hours PRN        REVIEW OF SYSTEMS  --------------------------------------------------------------------------------  General:  as per hpi  HEENT:  no headaches, no vision changes, no ear pain  CV:  no chest pain, no palpitations  Resp:  see hpi  GI:  as per hpi  :  no pain, no bleeding, no dysuria  Muscle:  no pain, no weakness  Neuro:  no numbness, no tingling, no memory problems  Psych:  no insomnia  Endocrine:  no cold/heat intolerance  Heme: no ecchymosis, no easy bruising  Skin:  no rash, no edema    all other systems were reviewed and are negative, except as noted       VITALS/PHYSICAL EXAM  --------------------------------------------------------------------------------  T(C): 37.5 (21 @ 08:00), Max: 37.5 (21 @ 08:00)  HR: 117 (21 @ 08:00) (109 - 132)  BP: --  RR: 37 (21 @ 08:00) (15 - 47)  SpO2: 99% (21 @ 08:00) (95% - 100%)  Wt(kg): --      21 @ 07:01  -  21 @ 07:00  --------------------------------------------------------  IN: 1160 mL / OUT: 1310 mL / NET: -150 mL      I&O's Detail    24 Aug 2021 07:01  -  25 Aug 2021 07:00  --------------------------------------------------------  IN:    IV PiggyBack: 50 mL    Miscellaneous Tube Feedin mL  Total IN: 1160 mL    OUT:    Drain (mL): 160 mL    Voided (mL): 1150 mL  Total OUT: 1310 mL    Total NET: -150 mL        Physical Exam:  Gen: lying in bed  HEENT: +trach +ngt   Chest: respirations non labored  Abd: soft generalized ttp nd perc c-tube in place   LE: no edema  Neuro: awake alert responsive      LABS/STUDIES  --------------------------------------------------------------------------------              9.8    23.47 >-----------<  332      [21 @ 00:59]              30.6     129  |  90  |  25  ----------------------------<  147      [21 @ 00:59]  4.6   |  28  |  0.61        Ca     11.0     [21 @ 00:59]      Mg     2.0     [21:59]      Phos  4.5     [21 00:59]    TPro  9.6  /  Alb  3.8  /  TBili  0.6  /  DBili  x   /  AST  48  /  ALT  53  /  AlkPhos  316  [21 @ 00:59]              [21 00:59]    Ca ionizedBlood Gas Arterial - Calcium, Ionized: 1.27 mmol/L (21 @ 00:39)  Blood Gas Arterial - Calcium, Ionized: 1.28 mmol/L (21 @ 11:38)    Creatinine Trend:  POC glucoseGlucose, Serum: 147 mg/dL (21 @ 00:59)  CAPILLARY BLOOD GLUCOSE      POCT Blood Glucose.: 251 mg/dL (25 Aug 2021 06:35)  POCT Blood Glucose.: 202 mg/dL (24 Aug 2021 17:27)  POCT Blood Glucose.: 156 mg/dL (24 Aug 2021 11:25)    PrealbuminPrealbumin, Serum: 11 mg/dL (21 @ 04:01)  Prealbumin, Serum: 10 mg/dL (08-15-21 @ 13:53)    Triglycerides

## 2021-08-25 NOTE — PROGRESS NOTE ADULT - ASSESSMENT
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 coli UTIs, driveline drainage (1/7/21) and COVID-19 (back in April 2020)  He was recently seen in clinic where he complained of abdominal pain and dark stools w constipation back in May. He presents to [facility] ER today weakness and fatigue, moderate and + Black stools for three days, on coumadin secondary to warfarin use in the setting of an LVAD. Patient has required transfusions for GIB in the past. Mostly recently back in jan 2021 pt had anemia with dark stools. No interventions was done at that time. However Last Endoscopy was done in July 2020 (negative). Today labs show patient is anemic with H/H of 4.5/16.3,. INR is 8.84 MAP in the 90s,   Returned from endoscopy appearing ill tachypneic with chills with new white out of his left lung    Remains with complaints of abdominal pain  leukocytosis   tan secretions from trach site-  sputum culture with normal cornelia  completing ten days of IV Cefepime for enterobacter bacteremia- can discontinue    Newly diagnosed tyrotoxicosis starting methimazole and steroids  possibly explaining ongoing abdominal pains    Morris Prater MD  [phone]  After 5pm/weekends [phone]          Morris Prater MD  [phone]  After 5pm/weekends [phone]

## 2021-08-25 NOTE — PROGRESS NOTE ADULT - SUBJECTIVE AND OBJECTIVE BOX
RICKY JOINT  MRN#: [id number]  Subjective:  Pulmonary progress  : recurrent Acute hypoxic respiratory Failure ,aspiration pneumonia, NICM  , chart reviewed and H/O obtained radiological and Laboratory study reviewed patient Examined     65M PMH ACC/AHA stage D HF due to NICM HM2 LVAD , TV annuloplasty ring 17 as destination therapy due to severe peripheral artery disease with significant stenosis  SIADH, Depression, CKD-3 with hyperkalemia, past E. coli UTIs, driveline drainage (21) and COVID-19 (back in 2020)  He was recently seen in clinic where he complained of abdominal pain and dark stools w constipation back in May. He presents to [facility] ER today weakness and fatigue, moderate and + Black stools for three days, on coumadin secondary to warfarin use in the setting of an LVAD. Patient has required transfusions for GIB in the past. Mostly recently back in 2021 pt had anemia with dark stools. No interventions was done at that time. However Last Endoscopy was done in 2020 (negative). Today labs show patient is anemic with H/H of 4.5/16.3,. INR is 8.84 MAP in the 90s, Temp 35.1. He denies any chest pain, shortness of breath, dizziness, abd pain, nausea or vomiting. found to have  rectal bleeding underwent endoscopy ,old blood in the proximal ileum ,  develop sepsis with LL opacity given Antibiotics , Extubated , reintubated , Bronchoscopy on Zosyn for LL pneumonia  and Amiodarone S/P TV Annuloplasty , patient remain intubated on full ventilatory support .S/P multiple units of blood transfusion , remain on full ventilatory support on Precedex and propofol , new central IJ line , diarrhea C diff. +ve on po vancomycin and IV Flagyl,  mildly distended belly , fever start on cefepime 2gm q 8 hrs S/P tracheostomy .  new RT Subclavian central line continue on contact  isolation ,still diarrhea on C-diff antibiotics ENT follow up appreciated , trial of C-PAP as tolerated , , copious secretion from trach. site chest x ray left lower lobe pneumonia , tolerating trch. collar 50% FI02 still excessive secretion need pulmonary toilet , off Ancef antibiotic , no more diarrhea back on full support mechanical ventilator , chest x ray show improvement in LLL air space disease, more awake and responsive on tube feeding no more diarrhea , S/P  Ancef for bacteremia no fever ,ID follow up noted ,  no nausea or vomiting or diarrhea still very weak and tired , event note pulled NG tube now replaced , back on tube feeding ,still on po vancomycin , getting PT and OT at bed side , no plan for decannulation for now. , no more diarrhea receiving PT and OPT at bed side , minimal secretion from tracheostomy site , no SOB getting stronger , improve muscle tone patient transfer to monitor bed still on contact isolation for C-Difficel colitis on 50% FI02, NG tube clogged , and change to resume tube feeding still loose stool . H/H drop significantly require blood transfusion , most likely GI bleeding , IV heparin D/C , vomiting 200 cc of creamy color tube feeding on hold no sob, still melena monitor in the CTU possible capsule endoscopy , H/H is stable ., patient develop TR sided  pneumothorax require chest tube placement , RT IJ central line  placed , develop fever shaking chills , blood culture positive for serratia marcescens , start on cefepime .the patient  become hypoxic and hypotensive placed on full ventilatory support and Vasopressin , levophed and Dobutamine ,S/P blood transfusion on meropenem and vancomycin ,   , on and  off pressors , occasional agitation on Precedex .S/P IR cholecystostomy tube drainage placement in the RT upper Quadrant , resume anticoagulation chest x ray noted C-PAP trail lasted only for 2 hrs , new RT SC line and D/C RT IJ line , RT pig tail cathter has been removed , tolerating C-PAP trial placed on trach. collar 50% FI02 GI consultant noted on NG tube feeding as tolerated , develop AF RVR S/P  bolus Amiodarone  back to regular sinus Rhythm , Flat Affect depressed , back on tube feeding Vital AF at 60 cc/ hr .still intermittent abdominal pain , no fever saturation is accepted  back on full ventilatory support , tired and sleepy on round   Dark stool evaluated by Vascular service recommend mesenteric Duplex. new Rt IJ line , C-PAP trial  wean off ventilator  to trah.collar 50% FI02,  vascular mesenteric Dopplex noted no evidence of severe  mesenteric arteries thrombosis .start  back on  tube feeding . tolerating it very well , no nausea or vomiting , good 02 saturation on trac-collar on methimazole for hyperthyroidism , no new C/O no plan for decannulation yet        (2021 16:57)    PAST MEDICAL & SURGICAL HISTORY:  CHF (congestive heart failure)    CAD (coronary artery disease)  Depression    Pleural effusion    History of 2019 novel coronavirus disease (COVID-19)  2020    Hemorrhoids    Bleeding hemorrhoids    Peripheral arterial disease    Claudication    BPH with urinary obstruction    ACC/AHA stage D systolic heart failure  Anticoagulation goal of INR 2.0 to 2.5    Falls    Clavicle fracture    CKD (chronic kidney disease), stage III    Iron deficiency anemia    H/O epistaxis    Vertigo    GI bleed    S/P TVR (tricuspid valve repair)    S/P ventricular assist device    S/P endoscopy    OBJECTIVE:  ICU Vital Signs Last 24 Hrs  T(C): 38.2 (2021 10:00), Max: 38.5 (2021 12:00)  T(F): 100.8 (2021 10:00), Max: 101.3 (2021 12:00)  HR: 65 (2021 10:00) (61 - 69)  BP: --  BP(mean): --  ABP: 105/67 (2021 10:00) (90/54 - 113/64)  ABP(mean): 77 (2021 10:00) (63 - 77)  RR: 20 (2021 10:00) (19 - 35)  SpO2: 99% (2021 10:00) (96% - 100%)       @ 07: @ 07:00  --------------------------------------------------------  IN: 2693.9 mL / OUT: 1415 mL / NET: 1278.9 mL     @ 07: @ 10:49  --------------------------------------------------------  IN: 420.8 mL / OUT: 115 mL / NET: 305.8 mL  PHYSICAL EXAM:Daily   Elderly male S/P tracheostomy on trach. collar 50% FI02 ,  Daily Weight in k.4 (2021 00:00)  HEENT:     + NCAT  + EOMI  - Conjuctival edema   - Icterus   - Thrush   - ETT  + NGT/OGT  Neck:         + FROM  RT IJ  line JVD  - Nodes - Masses + Mid-line trachea + Tracheostomy  Chest:            normal A-P diameter    Lungs:          + CTA   + Rhonchi    - Rales    - Wheezing + Decreased  LT BS   - Dullness R L  Cardiac:       + S1 + S2    + RRR   - Irregular   - S3  - S4    - Murmurs   - Rub   - Hamman’s sign   Abdomen:    + BS  + Soft + Non-tender - Distended - Organomegaly - PEG .cholecystostomy tube in place  Extremities:   - Cyanosis U/L   - Clubbing  U/L  + LE/UE Edema   + Capillary refill    + Pulses   Neuro:        - Awake   -  Alert   - Confused   - Lethargic   + Sedated  + Generalized Weakness  Skin:        - Rashes    - Erythema   + Normal incisions   + IV sites intact          HOSPITAL MEDICATIONS: All medications reviewed and analyzed  MEDICATIONS  (STANDING):  amiodarone    Tablet 200 milliGRAM(s) Oral daily  chlorhexidine 0.12% Liquid 15 milliLiter(s) Oral Mucosa every 12 hours  chlorhexidine 2% Cloths 1 Application(s) Topical <User Schedule>  dexmedetomidine Infusion 0.5 MICROgram(s)/kG/Hr (9.81 mL/Hr) IV Continuous <Continuous>  dextrose 50% Injectable 50 milliLiter(s) IV Push every 15 minutes  heparin  Infusion 400 Unit(s)/Hr (12.5 mL/Hr) IV Continuous <Continuous>  Hydromorphone  Injectable 0.5 milliGRAM(s) IV Push once  insulin lispro (ADMELOG) corrective regimen sliding scale   SubCutaneous every 6 hours  pantoprazole  Injectable 40 milliGRAM(s) IV Push every 12 hours  piperacillin/tazobactam IVPB.. 3.375 Gram(s) IV Intermittent every 8 hours  propofol Infusion 20 MICROgram(s)/kG/Min (9.42 mL/Hr) IV Continuous <Continuous>  sodium chloride 0.9% lock flush 3 milliLiter(s) IV Push every 8 hours  sodium chloride 0.9%. 1000 milliLiter(s) (10 mL/Hr) IV Continuous <Continuous>    MEDICATIONS  (PRN):  acetaminophen    Suspension .. 650 milliGRAM(s) Oral every 6 hours PRN Temp greater or equal to 38C (100.4F)    LABS: All Lab data reviewed and analyzed                                    9.8    .47 )-----------( 332      ( 25 Aug 2021 00:59 )             30.6   08-25    129<L>  |  90<L>  |  25<H>  ----------------------------<  147<H>  4.6   |  28  |  0.61    Ca    11.0<H>      25 Aug 2021 00:59  Phos  4.5     08-25  Mg     2.0     -    TPro  9.6<H>  /  Alb  3.8  /  TBili  0.6  /  DBili  x   /  AST  48<H>  /  ALT  53<H>  /  AlkPhos  316<H>      Ca    10.1      24 Aug 2021 00:47  Phos  3.6     08-24  Mg     1.8     -    TPro  8.8<H>  /  Alb  3.5  /  TBili  0.4  /  DBili  x   /  AST  30  /  ALT  35  /  AlkPhos  250<H>      Ca    9.8      23 Aug 2021 00:36  Phos  3.2     08-23  Mg     1.8     -    TPro  8.4<H>  /  Alb  3.3  /  TBili  0.3  /  DBili  x   /  AST  26  /  ALT  35  /  AlkPhos  217<H>        Ca    9.8      21 Aug 2021 00:37  Phos  2.9     08-21  Mg     1.7     -    TPro  8.1  /  Alb  3.3  /  TBili  0.4  /  DBili  x   /  AST  20  /  ALT  34  /  AlkPhos  205<H>      Ca    10.2      20 Aug 2021 00:37  Phos  3.5     08-20  Mg     1.6     -    TPro  8.2  /  Alb  3.4  /  TBili  0.6  /  DBili  x   /  AST  22  /  ALT  37  /  AlkPhos  190<H>                                                                                                 PTT - ( 2021 04:52 )  PTT:45.2 sec LIVER FUNCTIONS - ( 2021 00:42 )  Alb: 3.4 g/dL / Pro: 6.7 g/dL / ALK PHOS: 213 U/L / ALT: 15 U/L / AST: 24 U/L / GGT: x           RADIOLOGY: - Reviewed and analyzed RT Pig tail cathter  , LVAD HM2, CT scan of abdomen reviewed result noted

## 2021-08-25 NOTE — CONSULT NOTE ADULT - ASSESSMENT
65 year old male with history of Stage D HF due to NICM on LVAD,  TV annuloplasty ring 9/12/17 as destination therapy due to severe peripheral artery disease with significant stenosis  SIADH, Depression, CKD-3 with hyperkalemia consulted for management of hyperthyroidism. He is Graves negative but positive for Hashimoto's disease. US could not be performed due to restriction from trach   Noted to have worsening TFTs on MMI 20 mg and also slight LFT elevation today.   This is likely due to amiodarone induced type 2 thyrotoxicosis vs. 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

## 2021-08-25 NOTE — PROGRESS NOTE ADULT - ASSESSMENT
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 coli UTIs, driveline drainage (1/7/21) and COVID-19, here initially for GIB prolonged hospital course, s/p tracheostomy, acalculous cholecystitis s/p perc dawn. Patient has persistent intermittent abdominal pain. Per CTU team, pt has recently been refusing tube feeds and is being evaluated for chronic mesenteric ischemia vs SMA syndrome.  8/13 bld cxs positive. Consulted for TPN. Prealb 11, prior a1c 5.6, tg 141. 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 Tolerating feeds but with intermittent abd pain.    Current diet: NGT feeds (TwoCal HN @ goal 50cc/hr)    -tube feeds per CTU team  -no plan to initiate TPN at this time, will reassess need pending clinical course  -suspect uncontrolled hyperthyroidism contributing to metabolic issues; restarted on methimazole 8/17, trend lfts; propranolol dosing increased, remains tachycardic; care per endo  -abdominal pain- prn pain control, gi/vascular/rheum input noted  -anemia- rec iron supp when feasible  -hyponatremia- sp lasix, consider urine lytes, trend labs  -leukocytosis- on abx, w/u per ID/primary  -psych, palliative care, and ethics input noted  -management per CTU; will remain available as needed    discussed with Dr. Soliz, agreeable with above    TPN pager [phone], spectra [phone]  M-F 6A-4P, Weekends and holidays 8A-12P

## 2021-08-26 NOTE — PROGRESS NOTE ADULT - PROBLEM SELECTOR PLAN 3
- feed restarted at a lower rate today  -will have behavioral health see patient about possible depression

## 2021-08-26 NOTE — PROGRESS NOTE ADULT - ATTENDING COMMENTS
Patient seen and examined, agree with above. Patient denies nausea now, but had vomiting overnight. Alkaline phosphatase slowing increasing - previous U/S from 8/13 did not show any new findings. On exam, he still has chronic abdominal pain, mild and diffuse. Consider repeat CT A/P. Otherwise, rechallenge tube feeds in the meantime.

## 2021-08-26 NOTE — PROGRESS NOTE ADULT - SUBJECTIVE AND OBJECTIVE BOX
History:  Patient has been having nausea and abdominal pain.   His feeds at this time have been placed on hold         MEDICATIONS  (STANDING):  cefepime   IVPB      cefepime   IVPB 1000 milliGRAM(s) IV Intermittent every 8 hours  chlorhexidine 2% Cloths 1 Application(s) Topical <User Schedule>  esMOLOL  Infusion 100 MICROgram(s)/kG/Min (37 mL/Hr) IV Continuous <Continuous>  heparin   Injectable 5000 Unit(s) SubCutaneous every 8 hours  insulin lispro (ADMELOG) corrective regimen sliding scale   SubCutaneous every 6 hours  insulin NPH human recombinant 4 Unit(s) SubCutaneous every 6 hours  losartan 25 milliGRAM(s) Oral daily  methimazole 15 milliGRAM(s) Oral daily  metoclopramide Injectable 10 milliGRAM(s) IV Push every 8 hours  mirtazapine 7.5 milliGRAM(s) Oral daily  multivitamin 1 Tablet(s) Oral daily  pantoprazole  Injectable 40 milliGRAM(s) IV Push every 12 hours  predniSONE  Solution 40 milliGRAM(s) Oral daily  propranolol 30 milliGRAM(s) Oral every 8 hours  sertraline 50 milliGRAM(s) Oral daily  thiamine 100 milliGRAM(s) Oral daily  tobramycin for Nebulization 300 milliGRAM(s) Inhalation every 12 hours  vancomycin    Solution 125 milliGRAM(s) Oral every 6 hours    MEDICATIONS  (PRN):  acetaminophen    Suspension .. 650 milliGRAM(s) Oral every 6 hours PRN Mild Pain (1 - 3)  ondansetron Injectable 4 milliGRAM(s) IV Push every 6 hours PRN Nausea and/or Vomiting      Allergies    No Known Allergies    Intolerances      Review of Systems:  UNABLE TO OBTAIN DUE TO TRACH       PHYSICAL EXAM:  VITALS: T(C): 36.2 (08-26-21 @ 08:00)  T(F): 97.2 (08-26-21 @ 08:00), Max: 97.7 (08-25-21 @ 20:00)  HR: 122 (08-26-21 @ 11:00) (107 - 151)  RR:  (15 - 54)  SpO2:  (93% - 100%)    GENERAL: NAD, well-groomed, well-developed  EYES: No proptosis, no lid lag, anicteric  HEENT:  Atraumatic, Normocephalic, moist mucous membranes +Trach placement   RESPIRATORY: Clear to auscultation bilaterally;   CARDIOVASCULAR: +Tachycardic   GI: +Drain   SKIN: Dry, intact, No rashes or lesions  MUSCULOSKELETAL: No clubbing, cyanosis or edema   PSYCH: Alert and oriented       POCT Blood Glucose.: 118 mg/dL (08-26-21 @ 11:40)  POCT Blood Glucose.: 165 mg/dL (08-26-21 @ 06:53)  POCT Blood Glucose.: 264 mg/dL (08-26-21 @ 00:24)  POCT Blood Glucose.: 205 mg/dL (08-25-21 @ 17:38)  POCT Blood Glucose.: 229 mg/dL (08-25-21 @ 12:14)  POCT Blood Glucose.: 251 mg/dL (08-25-21 @ 06:35)  POCT Blood Glucose.: 202 mg/dL (08-24-21 @ 17:27)  POCT Blood Glucose.: 156 mg/dL (08-24-21 @ 11:25)  POCT Blood Glucose.: 161 mg/dL (08-24-21 @ 05:03)  POCT Blood Glucose.: 187 mg/dL (08-24-21 @ 00:28)  POCT Blood Glucose.: 129 mg/dL (08-23-21 @ 17:06)  POCT Blood Glucose.: 166 mg/dL (08-23-21 @ 12:56)      08-26    x   |  x   |  x   ----------------------------<  x   5.1   |  x   |  x     EGFR if : x   EGFR if non : x     Ca    10.7<H>      08-26  Mg     2.2     08-26  Phos  3.6     08-26    TPro  9.7<H>  /  Alb  3.6  /  TBili  0.6  /  DBili  x   /  AST  53<H>  /  ALT  68<H>  /  AlkPhos  332<H>  08-26          Thyroid Function Tests:  08-25 @ 03:32 TSH <0.01 FreeT4 -- T3 343 Anti TPO -- Anti Thyroglobulin Ab -- TSI --  08-22 @ 11:57 TSH -- FreeT4 >7.8 T3 -- Anti TPO -- Anti Thyroglobulin Ab -- TSI --

## 2021-08-26 NOTE — PROGRESS NOTE ADULT - PROBLEM SELECTOR PLAN 4
- serratia bacteremia and poss acalculous cholecystitis. B/c with gram + cocci and many enterobacter Carbapenem resistant strain  -patient with increase sputum production, will repeat infectious work up   - sputum Cx 8/13 with enterobacter   - per ID, cefepime for 7 days, got vanc, getting level  - also on ppx PO Vancomycin   - further guidance by ID

## 2021-08-26 NOTE — PROGRESS NOTE ADULT - ASSESSMENT
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 coli UTIs, driveline drainage (1/7/21) and COVID-19, here initially for GIB prolonged hospital course, s/p tracheostomy, acalculous cholecystitis s/p perc dawn. Patient has persistent intermittent abdominal pain. Per CTU team, pt has recently been refusing tube feeds and is being evaluated for chronic mesenteric ischemia vs SMA syndrome.  8/13 bld cxs positive. Consulted for TPN. Prealb 11, prior a1c 5.6, tg 141. 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 Tolerating feeds but with intermittent abd pain. TF now held 2/2 vomiting    -tube feeds as per CTU team  -no present plan to initiate TPN, will reassess need pending clinical course  -suspect uncontrolled hyperthyroidism contributing to metabolic issues; management as per endocrine  -abdominal pain/vomiting- f/u axr; reglan per gi; planned for vascular f/u  -anemia- rec iron supp when feasible  -hyponatremia- consider urine lytes, trend labs  -leukocytosis- on abx, f/u cxs, w/u per ID/primary  -psych, palliative care, and ethics input noted  -management per CTU; will remain available as needed    discussed with Dr. Soliz and Dr ANNE Tang, agreeable with above    TPN pager [phone], spectra [phone]  M-F 6A-4P, Weekends and holidays 8A-12P

## 2021-08-26 NOTE — PROGRESS NOTE ADULT - ASSESSMENT
65 year old male with history of Stage D HF due to NICM on LVAD,  TV annuloplasty ring 9/12/17 as destination therapy due to severe peripheral artery disease with significant stenosis  SIADH, Depression, CKD-3 with hyperkalemia consulted for management of hyperthyroidism. He is Graves negative but positive for Hashimoto's disease. US could not be performed due to restriction from trach   Noted to have worsening TFTs on MMI 20 mg and also slight LFT elevation today.   This is likely due to amiodarone induced type 2 thyrotoxicosis vs. 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

## 2021-08-26 NOTE — PROGRESS NOTE ADULT - SUBJECTIVE AND OBJECTIVE BOX
Interval Events:   - GI called for 2 episodes of NBNB emesis overnight  - Team report patient has been doing well on TF at full rate since 8/20  - Having BMs  - Family considering PEG    Allergies:  No Known Allergies      Hospital Medications:  acetaminophen    Suspension .. 650 milliGRAM(s) Oral every 6 hours PRN  cefepime   IVPB      cefepime   IVPB 1000 milliGRAM(s) IV Intermittent every 8 hours  chlorhexidine 2% Cloths 1 Application(s) Topical <User Schedule>  esMOLOL  Infusion 100 MICROgram(s)/kG/Min IV Continuous <Continuous>  heparin   Injectable 5000 Unit(s) SubCutaneous every 8 hours  insulin lispro (ADMELOG) corrective regimen sliding scale   SubCutaneous every 6 hours  insulin NPH human recombinant 4 Unit(s) SubCutaneous every 6 hours  losartan 25 milliGRAM(s) Oral daily  methimazole 15 milliGRAM(s) Oral daily  metoclopramide Injectable 10 milliGRAM(s) IV Push every 8 hours  mirtazapine 7.5 milliGRAM(s) Oral daily  multivitamin 1 Tablet(s) Oral daily  ondansetron Injectable 4 milliGRAM(s) IV Push every 6 hours PRN  pantoprazole  Injectable 40 milliGRAM(s) IV Push every 12 hours  predniSONE  Solution 40 milliGRAM(s) Oral daily  propranolol 30 milliGRAM(s) Oral every 8 hours  sertraline 50 milliGRAM(s) Oral daily  thiamine 100 milliGRAM(s) Oral daily  tobramycin for Nebulization 300 milliGRAM(s) Inhalation every 12 hours  vancomycin    Solution 125 milliGRAM(s) Oral every 6 hours      PMHX/PSHX:  No pertinent past medical history    CHF (congestive heart failure)    CAD (coronary artery disease)    Depression    Pleural effusion    History of 2019 novel coronavirus disease (COVID-19)    Hemorrhoids    Bleeding hemorrhoids    Peripheral arterial disease    Claudication    BPH with urinary obstruction    ACC/AHA stage D systolic heart failure    Anticoagulation goal of INR 2.0 to 2.5    Falls    Clavicle fracture    CKD (chronic kidney disease), stage III    Iron deficiency anemia    H/O epistaxis    Vertigo    GI bleed    No significant past surgical history    S/P TVR (tricuspid valve repair)    S/P ventricular assist device    S/P endoscopy        Family history:  No pertinent family history in first degree relatives        ROS:   Limited, grossly denies discomfort      PHYSICAL EXAM:   Vital Signs:  Vital Signs Last 24 Hrs  T(C): 36.2 (26 Aug 2021 08:00), Max: 37.5 (25 Aug 2021 12:00)  T(F): 97.2 (26 Aug 2021 08:00), Max: 99.5 (25 Aug 2021 12:00)  HR: 118 (26 Aug 2021 09:00) (107 - 151)  BP: --  BP(mean): --  RR: 20 (26 Aug 2021 09:00) (20 - 54)  SpO2: 100% (26 Aug 2021 09:00) (93% - 100%)  Daily     Daily       08-25-21 @ 07:01  -  08-26-21 @ 07:00  --------------------------------------------------------  IN: 1377 mL / OUT: 1875 mL / NET: -498 mL    08-26-21 @ 07:01  -  08-26-21 @ 10:30  --------------------------------------------------------  IN: 37 mL / OUT: 200 mL / NET: -163 mL      GENERAL:  chronically ill appearing, no distress  HEENT:  NC/AT,  conjunctivae clear, NGT In place  CHEST:  Full & symmetric excursion, no increased effort w/ respirations  HEART:  Regular rhythm & rate  ABDOMEN:  Soft, non-distended, nontender to diffuse palpation, perc tube in place  EXTREMITIES:  no LE  edema  SKIN:  No rash/erythema  NEURO:  Alert, oriented      LABS:                        9.6    25.45 )-----------( 330      ( 26 Aug 2021 01:00 )             30.7     Mean Cell Volume: 89.0 fl (08-26-21 @ 01:00)    08-26    x   |  x   |  x   ----------------------------<  x   5.1   |  x   |  x     Ca    10.7<H>      26 Aug 2021 01:00  Phos  3.6     08-26  Mg     2.2     08-26    TPro  9.7<H>  /  Alb  3.6  /  TBili  0.6  /  DBili  x   /  AST  53<H>  /  ALT  68<H>  /  AlkPhos  332<H>  08-26    LIVER FUNCTIONS - ( 26 Aug 2021 01:00 )  Alb: 3.6 g/dL / Pro: 9.7 g/dL / ALK PHOS: 332 U/L / ALT: 68 U/L / AST: 53 U/L / GGT: x                                       9.6    25.45 )-----------( 330      ( 26 Aug 2021 01:00 )             30.7                         9.8    23.47 )-----------( 332      ( 25 Aug 2021 00:59 )             30.6                         10.2   21.33 )-----------( 290      ( 24 Aug 2021 00:47 )             32.3       Imaging:           Interval Events:   - GI called for 2 episodes of NBNB emesis overnight  - Team report patient has been doing well on TF at full rate since 8/20  - Last BM yesterday  - Family considering PEG    Allergies:  No Known Allergies      Hospital Medications:  acetaminophen    Suspension .. 650 milliGRAM(s) Oral every 6 hours PRN  cefepime   IVPB      cefepime   IVPB 1000 milliGRAM(s) IV Intermittent every 8 hours  chlorhexidine 2% Cloths 1 Application(s) Topical <User Schedule>  esMOLOL  Infusion 100 MICROgram(s)/kG/Min IV Continuous <Continuous>  heparin   Injectable 5000 Unit(s) SubCutaneous every 8 hours  insulin lispro (ADMELOG) corrective regimen sliding scale   SubCutaneous every 6 hours  insulin NPH human recombinant 4 Unit(s) SubCutaneous every 6 hours  losartan 25 milliGRAM(s) Oral daily  methimazole 15 milliGRAM(s) Oral daily  metoclopramide Injectable 10 milliGRAM(s) IV Push every 8 hours  mirtazapine 7.5 milliGRAM(s) Oral daily  multivitamin 1 Tablet(s) Oral daily  ondansetron Injectable 4 milliGRAM(s) IV Push every 6 hours PRN  pantoprazole  Injectable 40 milliGRAM(s) IV Push every 12 hours  predniSONE  Solution 40 milliGRAM(s) Oral daily  propranolol 30 milliGRAM(s) Oral every 8 hours  sertraline 50 milliGRAM(s) Oral daily  thiamine 100 milliGRAM(s) Oral daily  tobramycin for Nebulization 300 milliGRAM(s) Inhalation every 12 hours  vancomycin    Solution 125 milliGRAM(s) Oral every 6 hours      PMHX/PSHX:  No pertinent past medical history    CHF (congestive heart failure)    CAD (coronary artery disease)    Depression    Pleural effusion    History of 2019 novel coronavirus disease (COVID-19)    Hemorrhoids    Bleeding hemorrhoids    Peripheral arterial disease    Claudication    BPH with urinary obstruction    ACC/AHA stage D systolic heart failure    Anticoagulation goal of INR 2.0 to 2.5    Falls    Clavicle fracture    CKD (chronic kidney disease), stage III    Iron deficiency anemia    H/O epistaxis    Vertigo    GI bleed    No significant past surgical history    S/P TVR (tricuspid valve repair)    S/P ventricular assist device    S/P endoscopy        Family history:  No pertinent family history in first degree relatives        ROS:   Limited, grossly denies discomfort      PHYSICAL EXAM:   Vital Signs:  Vital Signs Last 24 Hrs  T(C): 36.2 (26 Aug 2021 08:00), Max: 37.5 (25 Aug 2021 12:00)  T(F): 97.2 (26 Aug 2021 08:00), Max: 99.5 (25 Aug 2021 12:00)  HR: 118 (26 Aug 2021 09:00) (107 - 151)  BP: --  BP(mean): --  RR: 20 (26 Aug 2021 09:00) (20 - 54)  SpO2: 100% (26 Aug 2021 09:00) (93% - 100%)  Daily     Daily       08-25-21 @ 07:01  -  08-26-21 @ 07:00  --------------------------------------------------------  IN: 1377 mL / OUT: 1875 mL / NET: -498 mL    08-26-21 @ 07:01  -  08-26-21 @ 10:30  --------------------------------------------------------  IN: 37 mL / OUT: 200 mL / NET: -163 mL      GENERAL:  chronically ill appearing, no distress  HEENT:  NC/AT,  conjunctivae clear, NGT In place  CHEST:  Full & symmetric excursion, no increased effort w/ respirations  HEART:  Regular rhythm & rate  ABDOMEN:  Soft, non-distended, nontender to diffuse palpation, perc tube in place  EXTREMITIES:  no LE  edema  SKIN:  No rash/erythema  NEURO:  Alert, oriented      LABS:                        9.6    25.45 )-----------( 330      ( 26 Aug 2021 01:00 )             30.7     Mean Cell Volume: 89.0 fl (08-26-21 @ 01:00)    08-26    x   |  x   |  x   ----------------------------<  x   5.1   |  x   |  x     Ca    10.7<H>      26 Aug 2021 01:00  Phos  3.6     08-26  Mg     2.2     08-26    TPro  9.7<H>  /  Alb  3.6  /  TBili  0.6  /  DBili  x   /  AST  53<H>  /  ALT  68<H>  /  AlkPhos  332<H>  08-26    LIVER FUNCTIONS - ( 26 Aug 2021 01:00 )  Alb: 3.6 g/dL / Pro: 9.7 g/dL / ALK PHOS: 332 U/L / ALT: 68 U/L / AST: 53 U/L / GGT: x                                       9.6    25.45 )-----------( 330      ( 26 Aug 2021 01:00 )             30.7                         9.8    23.47 )-----------( 332      ( 25 Aug 2021 00:59 )             30.6                         10.2   21.33 )-----------( 290      ( 24 Aug 2021 00:47 )             32.3       Imaging:

## 2021-08-26 NOTE — PROGRESS NOTE ADULT - ATTENDING COMMENTS
67 y/o stage D NICM s/p HM2 on 9/2017 as DT (due to severe PAD) with TV ring, prior COVID-19 infection 4/2020, recurrent syncope post LVAD s/p ILR, and chronic abdominal pain admitted on 6/14 with symptomatic anemia in setting of INR 8.8 without hemodynamic instability. Workup showed active bleeding in the mid small bowel with negative enteroscopy. Hospital course c/b respiratory failure with fever/hypertension, low flow alarms, and pulmonary infiltrate with hypoxia requiring intubation from probable aspiration PNA. Underwent tracheostomy for inability to wean. Had recurrent GI bleed as well as Serratia bacteremia s/p Cholecystostomy tube by IR. Sputum culture from 8/13 positive for Enterobacter for which he is is on treatment. Had abdominal pain from unclear etiology (? SMA stenosis) and was tolerating TFs until last night. Appears euvolemic and VAD without any events.   - continue with meds as above  - appreciate endo c/w re; thyrotoxicosis (? amio vs. CT contrast study)  - will attempt TFs post-pyloric; if continues to have abdom pain, will reconsult vascular for consideration of stent of SMA  - palliative care input appreciated

## 2021-08-26 NOTE — PROGRESS NOTE ADULT - PROBLEM SELECTOR PLAN 2
- Current speed 9200 RPM; no alarms in past 24 hrs  - will hold coumadin indefinitely given GIB  - Will plan to hold aspirin indefinitely given recurrent GIB.  - Continue to monitor LDH, stable

## 2021-08-26 NOTE — PROGRESS NOTE ADULT - SUBJECTIVE AND OBJECTIVE BOX
RICKY JOINT  MRN#: [id number]  Subjective:  Pulmonary progress  : recurrent Acute hypoxic respiratory Failure ,aspiration pneumonia, NICM  , chart reviewed and H/O obtained radiological and Laboratory study reviewed patient Examined     65M PMH ACC/AHA stage D HF due to NICM HM2 LVAD , TV annuloplasty ring 17 as destination therapy due to severe peripheral artery disease with significant stenosis  SIADH, Depression, CKD-3 with hyperkalemia, past E. coli UTIs, driveline drainage (21) and COVID-19 (back in 2020)  He was recently seen in clinic where he complained of abdominal pain and dark stools w constipation back in May. He presents to [facility] ER today weakness and fatigue, moderate and + Black stools for three days, on coumadin secondary to warfarin use in the setting of an LVAD. Patient has required transfusions for GIB in the past. Mostly recently back in 2021 pt had anemia with dark stools. No interventions was done at that time. However Last Endoscopy was done in 2020 (negative). Today labs show patient is anemic with H/H of 4.5/16.3,. INR is 8.84 MAP in the 90s, Temp 35.1. He denies any chest pain, shortness of breath, dizziness, abd pain, nausea or vomiting. found to have  rectal bleeding underwent endoscopy ,old blood in the proximal ileum ,  develop sepsis with LL opacity given Antibiotics , Extubated , reintubated , Bronchoscopy on Zosyn for LL pneumonia  and Amiodarone S/P TV Annuloplasty , patient remain intubated on full ventilatory support .S/P multiple units of blood transfusion , remain on full ventilatory support on Precedex and propofol , new central IJ line , diarrhea C diff. +ve on po vancomycin and IV Flagyl,  mildly distended belly , fever start on cefepime 2gm q 8 hrs S/P tracheostomy .  new RT Subclavian central line continue on contact  isolation ,still diarrhea on C-diff antibiotics ENT follow up appreciated , trial of C-PAP as tolerated , , copious secretion from trach. site chest x ray left lower lobe pneumonia , tolerating trch. collar 50% FI02 still excessive secretion need pulmonary toilet , off Ancef antibiotic , no more diarrhea back on full support mechanical ventilator , chest x ray show improvement in LLL air space disease, more awake and responsive on tube feeding no more diarrhea , S/P  Ancef for bacteremia no fever ,ID follow up noted ,  no nausea or vomiting or diarrhea still very weak and tired , event note pulled NG tube now replaced , back on tube feeding ,still on po vancomycin , getting PT and OT at bed side , no plan for decannulation for now. , no more diarrhea receiving PT and OPT at bed side , minimal secretion from tracheostomy site , no SOB getting stronger , improve muscle tone patient transfer to monitor bed still on contact isolation for C-Difficel colitis on 50% FI02, NG tube clogged , and change to resume tube feeding still loose stool . H/H drop significantly require blood transfusion , most likely GI bleeding , IV heparin D/C , vomiting 200 cc of creamy color tube feeding on hold no sob, still melena monitor in the CTU possible capsule endoscopy , H/H is stable ., patient develop TR sided  pneumothorax require chest tube placement , RT IJ central line  placed , develop fever shaking chills , blood culture positive for serratia marcescens , start on cefepime .the patient  become hypoxic and hypotensive placed on full ventilatory support and Vasopressin , levophed and Dobutamine ,S/P blood transfusion on meropenem and vancomycin ,   , on and  off pressors , occasional agitation on Precedex .S/P IR cholecystostomy tube drainage placement in the RT upper Quadrant , resume anticoagulation chest x ray noted C-PAP trail lasted only for 2 hrs , new RT SC line and D/C RT IJ line , RT pig tail cathter has been removed , tolerating C-PAP trial placed on trach. collar 50% FI02 GI consultant noted on NG tube feeding as tolerated , develop AF RVR S/P  bolus Amiodarone  back to regular sinus Rhythm , Flat Affect depressed , back on tube feeding Vital AF at 60 cc/ hr .still intermittent abdominal pain , no fever saturation is accepted  back on full ventilatory support , tired and sleepy on round  .start  back on  tube feeding . tolerating it very well , no nausea or vomiting , good 02 saturation on trac-collar on methimazole for hyperthyroidism , no new C/O no plan for decannulation yet , electrolyte blood test is still pending .       (2021 16:57)    PAST MEDICAL & SURGICAL HISTORY:  CHF (congestive heart failure)    CAD (coronary artery disease)  Depression    Pleural effusion    History of 2019 novel coronavirus disease (COVID-19)  2020    Hemorrhoids    Bleeding hemorrhoids    Peripheral arterial disease    Claudication    BPH with urinary obstruction    ACC/AHA stage D systolic heart failure  Anticoagulation goal of INR 2.0 to 2.5    Falls    Clavicle fracture    CKD (chronic kidney disease), stage III    Iron deficiency anemia    H/O epistaxis    Vertigo    GI bleed    S/P TVR (tricuspid valve repair)    S/P ventricular assist device    S/P endoscopy    OBJECTIVE:  ICU Vital Signs Last 24 Hrs  T(C): 38.2 (2021 10:00), Max: 38.5 (2021 12:00)  T(F): 100.8 (2021 10:00), Max: 101.3 (2021 12:00)  HR: 65 (2021 10:00) (61 - 69)  BP: --  BP(mean): --  ABP: 105/67 (2021 10:00) (90/54 - 113/64)  ABP(mean): 77 (2021 10:00) (63 - 77)  RR: 20 (2021 10:00) (19 - 35)  SpO2: 99% (2021 10:00) (96% - 100%)       @ : @ 07:00  --------------------------------------------------------  IN: 2693.9 mL / OUT: 1415 mL / NET: 1278.9 mL     @ 07: @ 10:49  --------------------------------------------------------  IN: 420.8 mL / OUT: 115 mL / NET: 305.8 mL  PHYSICAL EXAM:Daily   Elderly male S/P tracheostomy on trach. collar 50% FI02 ,  Daily Weight in k.4 (2021 00:00)  HEENT:     + NCAT  + EOMI  - Conjuctival edema   - Icterus   - Thrush   - ETT  + NGT/OGT  Neck:         + FROM  RT IJ  line JVD  - Nodes - Masses + Mid-line trachea + Tracheostomy  Chest:            normal A-P diameter    Lungs:          + CTA   + Rhonchi    - Rales    - Wheezing + Decreased  LT BS   - Dullness R L  Cardiac:       + S1 + S2    + RRR   - Irregular   - S3  - S4    - Murmurs   - Rub   - Hamman’s sign   Abdomen:    + BS  + Soft + Non-tender - Distended - Organomegaly - PEG .cholecystostomy tube in place  Extremities:   - Cyanosis U/L   - Clubbing  U/L  + LE/UE Edema   + Capillary refill    + Pulses   Neuro:        - Awake   -  Alert   - Confused   - Lethargic   + Sedated  + Generalized Weakness  Skin:        - Rashes    - Erythema   + Normal incisions   + IV sites intact          HOSPITAL MEDICATIONS: All medications reviewed and analyzed  MEDICATIONS  (STANDING):  amiodarone    Tablet 200 milliGRAM(s) Oral daily  chlorhexidine 0.12% Liquid 15 milliLiter(s) Oral Mucosa every 12 hours  chlorhexidine 2% Cloths 1 Application(s) Topical <User Schedule>  dexmedetomidine Infusion 0.5 MICROgram(s)/kG/Hr (9.81 mL/Hr) IV Continuous <Continuous>  dextrose 50% Injectable 50 milliLiter(s) IV Push every 15 minutes  heparin  Infusion 400 Unit(s)/Hr (12.5 mL/Hr) IV Continuous <Continuous>  Hydromorphone  Injectable 0.5 milliGRAM(s) IV Push once  insulin lispro (ADMELOG) corrective regimen sliding scale   SubCutaneous every 6 hours  pantoprazole  Injectable 40 milliGRAM(s) IV Push every 12 hours  piperacillin/tazobactam IVPB.. 3.375 Gram(s) IV Intermittent every 8 hours  propofol Infusion 20 MICROgram(s)/kG/Min (9.42 mL/Hr) IV Continuous <Continuous>  sodium chloride 0.9% lock flush 3 milliLiter(s) IV Push every 8 hours  sodium chloride 0.9%. 1000 milliLiter(s) (10 mL/Hr) IV Continuous <Continuous>    MEDICATIONS  (PRN):  acetaminophen    Suspension .. 650 milliGRAM(s) Oral every 6 hours PRN Temp greater or equal to 38C (100.4F)    LABS: All Lab data reviewed and analyzed                        9.6    25.45 )-----------( 330      ( 26 Aug 2021 01:00 )             30.7                           08-    x   |  x   |  x   ----------------------------<  x   5.1   |  x   |  x     Ca    10.7<H>      26 Aug 2021 01:00  Phos  3.6     08-  Mg     2.2         TPro  9.7<H>  /  Alb  3.6  /  TBili  0.6  /  DBili  x   /  AST  53<H>  /  ALT  68<H>  /  AlkPhos  332<H>        Ca    11.0<H>      25 Aug 2021 00:59  Phos  4.5     -  Mg     2.0         TPro  9.6<H>  /  Alb  3.8  /  TBili  0.6  /  DBili  x   /  AST  48<H>  /  ALT  53<H>  /  AlkPhos  316<H>      Ca    10.1      24 Aug 2021 00:47  Phos  3.6     08-  Mg     1.8         TPro  8.8<H>  /  Alb  3.5  /  TBili  0.4  /  DBili  x   /  AST  30  /  ALT  35  /  AlkPhos  250<H>      Ca    9.8      23 Aug 2021 00:36  Phos  3.2     08-  Mg     1.8         TPro  8.4<H>  /  Alb  3.3  /  TBili  0.3  /  DBili  x   /  AST  26  /  ALT  35  /  AlkPhos  217<H>                                                                                                     PTT - ( 2021 04:52 )  PTT:45.2 sec LIVER FUNCTIONS - ( 2021 00:42 )  Alb: 3.4 g/dL / Pro: 6.7 g/dL / ALK PHOS: 213 U/L / ALT: 15 U/L / AST: 24 U/L / GGT: x           RADIOLOGY: - Reviewed and analyzed RT Pig tail cathter  , LVAD HM2, CT scan of abdomen reviewed result noted

## 2021-08-26 NOTE — PROGRESS NOTE ADULT - ACP SCRIBE NAME
How Severe Is Your Skin Lesion?: mild Has Your Skin Lesion Been Treated?: not been treated Is This A New Presentation, Or A Follow-Up?: Skin Lesions Agnieszka Dilip

## 2021-08-26 NOTE — PROGRESS NOTE ADULT - PROBLEM SELECTOR PLAN 1
-hemodynamically stable, warm and well perfused. Euvolemic  -started on losartan 25mg daily  -on esmolol for tachycardia, wean as tolerated. Propranolol increased. -hemodynamically stable, warm and well perfused. Euvolemic  -started on losartan 25mg daily; noted to have hyperkalemia so would discontinue  -on esmolol for tachycardia, wean as tolerated. Propranolol increased.

## 2021-08-26 NOTE — PROGRESS NOTE ADULT - SUBJECTIVE AND OBJECTIVE BOX
Subjective: Patient had abdominal pain yesterday. Feeds were restarted.     Medications:  acetaminophen    Suspension .. 650 milliGRAM(s) Oral every 6 hours PRN  cefepime   IVPB      cefepime   IVPB 1000 milliGRAM(s) IV Intermittent every 8 hours  chlorhexidine 2% Cloths 1 Application(s) Topical <User Schedule>  esMOLOL  Infusion 100 MICROgram(s)/kG/Min IV Continuous <Continuous>  heparin   Injectable 5000 Unit(s) SubCutaneous every 8 hours  insulin lispro (ADMELOG) corrective regimen sliding scale   SubCutaneous every 6 hours  insulin NPH human recombinant 4 Unit(s) SubCutaneous every 6 hours  losartan 25 milliGRAM(s) Oral daily  methimazole 15 milliGRAM(s) Oral daily  metoclopramide Injectable 10 milliGRAM(s) IV Push every 8 hours  mirtazapine 7.5 milliGRAM(s) Oral daily  multivitamin 1 Tablet(s) Oral daily  ondansetron Injectable 4 milliGRAM(s) IV Push every 6 hours PRN  pantoprazole  Injectable 40 milliGRAM(s) IV Push every 12 hours  predniSONE  Solution 40 milliGRAM(s) Oral daily  propranolol 30 milliGRAM(s) Oral every 8 hours  sertraline 50 milliGRAM(s) Oral daily  thiamine 100 milliGRAM(s) Oral daily  tobramycin for Nebulization 300 milliGRAM(s) Inhalation every 12 hours  vancomycin    Solution 125 milliGRAM(s) Oral every 6 hours      Vitals:  T(F): 98 (08-26-21 @ 12:00), Max: 98 (08-26-21 @ 12:00)  HR: 119 (08-26-21 @ 12:00) (107 - 151)  ABP: 84/63 (08-26-21 @ 12:00) (70/58 - 129/85)  ABP(mean): 72 (08-26-21 @ 12:00) (64 - 105)  RR: 18 (08-26-21 @ 12:00) (15 - 54)  SpO2: 99% (08-26-21 @ 12:00) (93% - 100%)    Daily     Daily   Weight (kg): 61.6 (08-26-21 @ 00:00)    I&O's Summary    25 Aug 2021 07:01  -  26 Aug 2021 07:00  --------------------------------------------------------  IN: 1377 mL / OUT: 1875 mL / NET: -498 mL    26 Aug 2021 07:01  -  26 Aug 2021 12:43  --------------------------------------------------------  IN: 47 mL / OUT: 200 mL / NET: -153 mL        Physical Exam:     General: No distress. Comfortable.  Neck: Neck supple. JVP not elevated. No masses  Chest: Clear to auscultation bilaterally  CV: Typical LVAD sounds. No palpable pulses  Abdomen: Soft, non-distended, non-tender  Extremities: no LE edema, warm and well perfused  Skin: No rashes or skin breakdown  Neurology: Alert and oriented times three. Sensation intact  Psych: Affect normal    LVAD Interrogation: Heart Mate 3  Speed: 9200  Flow: 5.6  Power: 5.8  PI: 5.7  Event: No events  No programming changes were made    Labs:                        9.6    25.45 )-----------( 330      ( 26 Aug 2021 01:00 )             30.7     08-26    x   |  x   |  x   ----------------------------<  x   5.1   |  x   |  x     Ca    10.7<H>      26 Aug 2021 01:00  Phos  3.6     08-26  Mg     2.2     08-26    TPro  9.7<H>  /  Alb  3.6  /  TBili  0.6  /  DBili  x   /  AST  53<H>  /  ALT  68<H>  /  AlkPhos  332<H>  08-26              Lactate Dehydrogenase, Serum: 216 U/L (08-26 @ 01:00)  Lactate Dehydrogenase, Serum: 225 U/L (08-25 @ 00:59)  Lactate Dehydrogenase, Serum: 210 U/L (08-24 @ 00:47)

## 2021-08-26 NOTE — PROGRESS NOTE ADULT - SUBJECTIVE AND OBJECTIVE BOX
[facility] NUTRITION SUPPORT-- FOLLOW UP NOTE  --------------------------------------------------------------------------------  24 hour events/subjective: pt seen. feeds held overnight for vomiting x2 (after receiving propranolol). tachycardic, received metoprolol x2, later started on esmolol gtt. endo eval noted, started on prednisone. abd xray pending and bld cxs sent.    Diet:  Diet, NPO with Tube Feed:   Tube Feeding Modality: Nasogastric  TwoCal HN (TWOCALHNRTH)  Total Volume for 24 Hours (mL): 1200  Continuous  Starting Tube Feed Rate mL per Hour: 50     Every 6 hours  Until Goal Tube Feed Rate (mL per Hour): 50  Tube Feed Duration (in Hours): 24  Tube Feed Start Time: 15:30  Supplement Feeding Modality:  Nasogastric  Probiotic Yogurt/Smoothie Cans or Servings Per Day:  2       Frequency:  Daily (21 @ 03:52)      PAST HISTORY  --------------------------------------------------------------------------------  No significant changes to PMH, PSH, FHx, SHx, unless otherwise noted    ALLERGIES & MEDICATIONS  --------------------------------------------------------------------------------  Allergies    No Known Allergies    Intolerances      Standing Inpatient Medications  cefepime   IVPB      cefepime   IVPB 1000 milliGRAM(s) IV Intermittent every 8 hours  chlorhexidine 2% Cloths 1 Application(s) Topical <User Schedule>  esMOLOL  Infusion 100 MICROgram(s)/kG/Min IV Continuous <Continuous>  heparin   Injectable 5000 Unit(s) SubCutaneous every 8 hours  insulin lispro (ADMELOG) corrective regimen sliding scale   SubCutaneous every 6 hours  insulin NPH human recombinant 4 Unit(s) SubCutaneous every 6 hours  losartan 25 milliGRAM(s) Oral daily  methimazole 15 milliGRAM(s) Oral daily  metoclopramide Injectable 10 milliGRAM(s) IV Push every 8 hours  mirtazapine 7.5 milliGRAM(s) Oral daily  multivitamin 1 Tablet(s) Oral daily  pantoprazole  Injectable 40 milliGRAM(s) IV Push every 12 hours  predniSONE  Solution 40 milliGRAM(s) Oral daily  propranolol 30 milliGRAM(s) Oral every 8 hours  sertraline 50 milliGRAM(s) Oral daily  thiamine 100 milliGRAM(s) Oral daily  tobramycin for Nebulization 300 milliGRAM(s) Inhalation every 12 hours  vancomycin    Solution 125 milliGRAM(s) Oral every 6 hours    PRN Inpatient Medications  acetaminophen    Suspension .. 650 milliGRAM(s) Oral every 6 hours PRN  ondansetron Injectable 4 milliGRAM(s) IV Push every 6 hours PRN        REVIEW OF SYSTEMS  --------------------------------------------------------------------------------    see hpi      VITALS/PHYSICAL EXAM  --------------------------------------------------------------------------------  T(C): 36.2 (21 @ 08:00), Max: 37.5 (21 @ 12:00)  HR: 119 (21 @ 08:35) (107 - 151)  BP: --  RR: 20 (21 @ 08:35) (20 - 54)  SpO2: 100% (21 @ 08:35) (93% - 100%)  Wt(kg): --    Weight (kg): 61.6 (21 @ 00:00)    21 @ 07:01  -  21 @ 07:00  --------------------------------------------------------  IN: 1377 mL / OUT: 1875 mL / NET: -498 mL      I&O's Detail    25 Aug 2021 07:01  -  26 Aug 2021 07:00  --------------------------------------------------------  IN:    Enteral Tube Flush: 240 mL    Esmolol: 37 mL    IV PiggyBack: 50 mL    Miscellaneous Tube Feedin mL  Total IN: 1377 mL    OUT:    Drain (mL): 250 mL    Emesis (mL): 625 mL    Stool (mL): 600 mL    Voided (mL): 400 mL  Total OUT: 1875 mL    Total NET: -498 mL          Physical Exam:  Gen: lying in bed  HEENT: +trach +ngt   Chest: respirations non labored  Abd: soft ttp +perc c-tube  Neuro: awake alert responsive      LABS/STUDIES  --------------------------------------------------------------------------------              9.6    25.45 >-----------<  330      [21 @ 01:00]              30.7     x   |  x   |  x   ----------------------------<  x       [21 @ 04:51]  5.1   |  x   |  x         Ca     10.7     [21 @ 01:00]      Mg     2.2     [21 @ 01:00]      Phos  3.6     [21 @ 01:00]    TPro  9.7  /  Alb  3.6  /  TBili  0.6  /  DBili  x   /  AST  53  /  ALT  68  /  AlkPhos  332  [21 @ 01:00]              [21 @ 01:00]    Ca ionizedBlood Gas Arterial - Calcium, Ionized: 1.35 mmol/L (21 @ 00:31)  Blood Gas Arterial - Calcium, Ionized: 1.27 mmol/L (21 @ 00:39)    Creatinine Trend:  POC glucoseGlucose, Serum: 268 mg/dL (21 @ 01:00)  CAPILLARY BLOOD GLUCOSE      POCT Blood Glucose.: 165 mg/dL (26 Aug 2021 06:53)  POCT Blood Glucose.: 264 mg/dL (26 Aug 2021 00:24)  POCT Blood Glucose.: 205 mg/dL (25 Aug 2021 17:38)  POCT Blood Glucose.: 229 mg/dL (25 Aug 2021 12:14)    PrealbuminPrealbumin, Serum: 11 mg/dL (21 @ 04:01)  Prealbumin, Serum: 10 mg/dL (08-15-21 @ 13:53)    Triglycerides     [facility] NUTRITION SUPPORT-- FOLLOW UP NOTE  --------------------------------------------------------------------------------  24 hour events/subjective: pt seen. feeds held overnight for vomiting x2 (after receiving propranolol). tachycardic, received metoprolol x2, later started on esmolol gtt. endo eval noted, started on prednisone. abd xray pending and bld cxs sent.    Diet:  Diet, NPO with Tube Feed:   Tube Feeding Modality: Nasogastric  TwoCal HN (TWOCALHNRTH)  Total Volume for 24 Hours (mL): 1200  Continuous  Starting Tube Feed Rate mL per Hour: 50     Every 6 hours  Until Goal Tube Feed Rate (mL per Hour): 50  Tube Feed Duration (in Hours): 24  Tube Feed Start Time: 15:30  Supplement Feeding Modality:  Nasogastric  Probiotic Yogurt/Smoothie Cans or Servings Per Day:  2       Frequency:  Daily (21 @ 03:52)      PAST HISTORY  --------------------------------------------------------------------------------  No significant changes to PMH, PSH, FHx, SHx, unless otherwise noted    ALLERGIES & MEDICATIONS  --------------------------------------------------------------------------------  Allergies    No Known Allergies    Intolerances      Standing Inpatient Medications  cefepime   IVPB      cefepime   IVPB 1000 milliGRAM(s) IV Intermittent every 8 hours  chlorhexidine 2% Cloths 1 Application(s) Topical <User Schedule>  esMOLOL  Infusion 100 MICROgram(s)/kG/Min IV Continuous <Continuous>  heparin   Injectable 5000 Unit(s) SubCutaneous every 8 hours  insulin lispro (ADMELOG) corrective regimen sliding scale   SubCutaneous every 6 hours  insulin NPH human recombinant 4 Unit(s) SubCutaneous every 6 hours  losartan 25 milliGRAM(s) Oral daily  methimazole 15 milliGRAM(s) Oral daily  metoclopramide Injectable 10 milliGRAM(s) IV Push every 8 hours  mirtazapine 7.5 milliGRAM(s) Oral daily  multivitamin 1 Tablet(s) Oral daily  pantoprazole  Injectable 40 milliGRAM(s) IV Push every 12 hours  predniSONE  Solution 40 milliGRAM(s) Oral daily  propranolol 30 milliGRAM(s) Oral every 8 hours  sertraline 50 milliGRAM(s) Oral daily  thiamine 100 milliGRAM(s) Oral daily  tobramycin for Nebulization 300 milliGRAM(s) Inhalation every 12 hours  vancomycin    Solution 125 milliGRAM(s) Oral every 6 hours    PRN Inpatient Medications  acetaminophen    Suspension .. 650 milliGRAM(s) Oral every 6 hours PRN  ondansetron Injectable 4 milliGRAM(s) IV Push every 6 hours PRN        REVIEW OF SYSTEMS  --------------------------------------------------------------------------------    see hpi      VITALS/PHYSICAL EXAM  --------------------------------------------------------------------------------  T(C): 36.2 (21 @ 08:00), Max: 37.5 (21 @ 12:00)  HR: 119 (21 @ 08:35) (107 - 151)  BP: --  RR: 20 (21 @ 08:35) (20 - 54)  SpO2: 100% (21 @ 08:35) (93% - 100%)  Wt(kg): --    Weight (kg): 61.6 (21 @ 00:00)    21 @ 07:01  -  21 @ 07:00  --------------------------------------------------------  IN: 1377 mL / OUT: 1875 mL / NET: -498 mL      I&O's Detail    25 Aug 2021 07:01  -  26 Aug 2021 07:00  --------------------------------------------------------  IN:    Enteral Tube Flush: 240 mL    Esmolol: 37 mL    IV PiggyBack: 50 mL    Miscellaneous Tube Feedin mL  Total IN: 1377 mL    OUT:    Drain (mL): 250 mL    Emesis (mL): 625 mL    Stool (mL): 600 mL    Voided (mL): 400 mL  Total OUT: 1875 mL    Total NET: -498 mL          Physical Exam:  Gen: lying in bed  HEENT: +trach +ngt   Chest: respirations non labored  Neuro: awake alert responsive      LABS/STUDIES  --------------------------------------------------------------------------------              9.6    25.45 >-----------<  330      [21 @ 01:00]              30.7     x   |  x   |  x   ----------------------------<  x       [21 @ 04:51]  5.1   |  x   |  x         Ca     10.7     [21 @ 01:00]      Mg     2.2     [21 @ 01:00]      Phos  3.6     [21 @ 01:00]    TPro  9.7  /  Alb  3.6  /  TBili  0.6  /  DBili  x   /  AST  53  /  ALT  68  /  AlkPhos  332  [21 @ 01:00]              [21 @ 01:00]    Ca ionizedBlood Gas Arterial - Calcium, Ionized: 1.35 mmol/L (21 @ 00:31)  Blood Gas Arterial - Calcium, Ionized: 1.27 mmol/L (21 @ 00:39)    Creatinine Trend:  POC glucoseGlucose, Serum: 268 mg/dL (21 @ 01:00)  CAPILLARY BLOOD GLUCOSE      POCT Blood Glucose.: 165 mg/dL (26 Aug 2021 06:53)  POCT Blood Glucose.: 264 mg/dL (26 Aug 2021 00:24)  POCT Blood Glucose.: 205 mg/dL (25 Aug 2021 17:38)  POCT Blood Glucose.: 229 mg/dL (25 Aug 2021 12:14)    PrealbuminPrealbumin, Serum: 11 mg/dL (21 @ 04:01)  Prealbumin, Serum: 10 mg/dL (08-15-21 @ 13:53)    Triglycerides     [facility] NUTRITION SUPPORT-- FOLLOW UP NOTE  --------------------------------------------------------------------------------  24 hour events/subjective: pt seen. feeds held overnight for vomiting x2 (after receiving propranolol). tachycardic, received metoprolol x2, later started on esmolol gtt. endo eval noted, started on prednisone. abd xray pending and bld cxs sent. wbc continues to uptrend    Diet:  Diet, NPO with Tube Feed:   Tube Feeding Modality: Nasogastric  TwoCal HN (TWOCALHNRTH)  Total Volume for 24 Hours (mL): 1200  Continuous  Starting Tube Feed Rate mL per Hour: 50     Every 6 hours  Until Goal Tube Feed Rate (mL per Hour): 50  Tube Feed Duration (in Hours): 24  Tube Feed Start Time: 15:30  Supplement Feeding Modality:  Nasogastric  Probiotic Yogurt/Smoothie Cans or Servings Per Day:  2       Frequency:  Daily (21 @ 03:52)      PAST HISTORY  --------------------------------------------------------------------------------  No significant changes to PMH, PSH, FHx, SHx, unless otherwise noted    ALLERGIES & MEDICATIONS  --------------------------------------------------------------------------------  Allergies    No Known Allergies    Intolerances      Standing Inpatient Medications  cefepime   IVPB      cefepime   IVPB 1000 milliGRAM(s) IV Intermittent every 8 hours  chlorhexidine 2% Cloths 1 Application(s) Topical <User Schedule>  esMOLOL  Infusion 100 MICROgram(s)/kG/Min IV Continuous <Continuous>  heparin   Injectable 5000 Unit(s) SubCutaneous every 8 hours  insulin lispro (ADMELOG) corrective regimen sliding scale   SubCutaneous every 6 hours  insulin NPH human recombinant 4 Unit(s) SubCutaneous every 6 hours  losartan 25 milliGRAM(s) Oral daily  methimazole 15 milliGRAM(s) Oral daily  metoclopramide Injectable 10 milliGRAM(s) IV Push every 8 hours  mirtazapine 7.5 milliGRAM(s) Oral daily  multivitamin 1 Tablet(s) Oral daily  pantoprazole  Injectable 40 milliGRAM(s) IV Push every 12 hours  predniSONE  Solution 40 milliGRAM(s) Oral daily  propranolol 30 milliGRAM(s) Oral every 8 hours  sertraline 50 milliGRAM(s) Oral daily  thiamine 100 milliGRAM(s) Oral daily  tobramycin for Nebulization 300 milliGRAM(s) Inhalation every 12 hours  vancomycin    Solution 125 milliGRAM(s) Oral every 6 hours    PRN Inpatient Medications  acetaminophen    Suspension .. 650 milliGRAM(s) Oral every 6 hours PRN  ondansetron Injectable 4 milliGRAM(s) IV Push every 6 hours PRN        REVIEW OF SYSTEMS  --------------------------------------------------------------------------------    see hpi      VITALS/PHYSICAL EXAM  --------------------------------------------------------------------------------  T(C): 36.2 (21 @ 08:00), Max: 37.5 (21 @ 12:00)  HR: 119 (21 @ 08:35) (107 - 151)  BP: --  RR: 20 (21 @ 08:35) (20 - 54)  SpO2: 100% (21 @ 08:35) (93% - 100%)  Wt(kg): --    Weight (kg): 61.6 (21 @ 00:00)    21 @ 07:01  -  21 @ 07:00  --------------------------------------------------------  IN: 1377 mL / OUT: 1875 mL / NET: -498 mL      I&O's Detail    25 Aug 2021 07:01  -  26 Aug 2021 07:00  --------------------------------------------------------  IN:    Enteral Tube Flush: 240 mL    Esmolol: 37 mL    IV PiggyBack: 50 mL    Miscellaneous Tube Feedin mL  Total IN: 1377 mL    OUT:    Drain (mL): 250 mL    Emesis (mL): 625 mL    Stool (mL): 600 mL    Voided (mL): 400 mL  Total OUT: 1875 mL    Total NET: -498 mL          Physical Exam:  Gen: lying in bed  HEENT: +trach +ngt   Chest: respirations non labored  Neuro: awake alert responsive      LABS/STUDIES  --------------------------------------------------------------------------------              9.6    25.45 >-----------<  330      [21 @ 01:00]              30.7     x   |  x   |  x   ----------------------------<  x       [21 @ 04:51]  5.1   |  x   |  x         Ca     10.7     [21 @ 01:00]      Mg     2.2     [21 @ 01:00]      Phos  3.6     [21 @ 01:00]    TPro  9.7  /  Alb  3.6  /  TBili  0.6  /  DBili  x   /  AST  53  /  ALT  68  /  AlkPhos  332  [21 @ 01:00]              [21 @ 01:00]    Ca ionizedBlood Gas Arterial - Calcium, Ionized: 1.35 mmol/L (21 @ 00:31)  Blood Gas Arterial - Calcium, Ionized: 1.27 mmol/L (21 @ 00:39)    Creatinine Trend:  POC glucoseGlucose, Serum: 268 mg/dL (21 @ 01:00)  CAPILLARY BLOOD GLUCOSE      POCT Blood Glucose.: 165 mg/dL (26 Aug 2021 06:53)  POCT Blood Glucose.: 264 mg/dL (26 Aug 2021 00:24)  POCT Blood Glucose.: 205 mg/dL (25 Aug 2021 17:38)  POCT Blood Glucose.: 229 mg/dL (25 Aug 2021 12:14)    PrealbuminPrealbumin, Serum: 11 mg/dL (21 @ 04:01)  Prealbumin, Serum: 10 mg/dL (08-15-21 @ 13:53)    Triglycerides

## 2021-08-26 NOTE — PROGRESS NOTE ADULT - ASSESSMENT
65 YO M with a history of stage D NICM s/p HM2 on 9/2017 as DT (due to severe PAD) with TV ring, prior COVID-19 infection 4/2020, recurrent syncope post LVAD s/p ILR, and chronic abdominal pain with prior negative workup who was admitted with symptomatic anemia with Hgb 4.5 in setting of INR 8.8 without hemodynamic instability. He was transfused and underwent VCE which showed active bleeding in the mid small bowel but subsequent enteroscopy 6/15 did not reveal any active bleeding. He acutely decompensated after procedure with fever/hypertension, low flow alarms, and pulmonary infiltrate with hypoxia requiring intubation from probable aspiration PNA. His LDH is normal and there are no signs of overt LVAD dysfunction on echo though signs of insufficiently unloaded ventricle for which his speed has been increased. Course notable for inability to wean ventilator with persistent secretions for which he underwent tracheostomy as well as acute c diff colitis, now resolved. Worsening anemia requiring transfusion with low flow alarms, concerning for recurrent GI bleed, now with stable Hgb. Had hypotension and septic picture. Cxs pos for serratia s/p Cholecystostomy tube by IR. Sputum culture from 8/13 positive for Enterobacter for which he is being treated with IV Meropenem and IV Vancomycin with downtrending leukocytosis. He has ongoing diffuse ABD pain with inability to tolerate tube feeds. CT ABD obtained to r/o mesenteric ischemia but limited evaluation due to artifact though with some concern of stenosis of proximal SMA. Patient has been tolerating feeds now however he now had a productive cough and leukocytosis.

## 2021-08-26 NOTE — PROGRESS NOTE ADULT - ASSESSMENT
66M Hx stage D NICM s/p HM2 on 9/2017 as DT (due to severe PAD) with TV ring, prior COVID-19 infection 4/2020, recurrent syncope post LVAD s/p ILR, and chronic abdominal pain with prior negative workup who was admitted with symptomatic anemia and supratherapeutic INR.     # Nausea and vomiting - 2 episodes overnight, now resolved with TF held. No clinical signs of obstruction. Passing gas. Suspect some underlying component of gastroparesis - possibly worsened by underlying infection or uncontrolled glucose. Recommending to continue tube feeds at rate that is tolerable. Continue with Reglan.  #Abd pain: intermittent. Inconsistent history regarding abdominal pain (reports location at times at per dawn/LVAD site, other times epigastric region, notes left sided pain many years prior // endorses chronicity possibly many years prior vs noted to be possibly x 1 week according to team // unclear if associated with TF). Now having brown BMs, no further recent bleeding. Ddx unclear source of pain -- possibly MSK at site of perc dawn drain/LVAD pump vs can consider esophagitis in setting of long standing NGT vs IBS vs unlikely biliary in nature (perc dawn draining well, normal LFTs, no collection on imaging) vs gastroparesis/ileus vs unlikely chronic mesenteric ischemia (vessels patent on imaging) vs unlikely SMA syndrome (borderline stenosis on doppler)  # Acute blood loss anemia and melena - resolved, Likely had recurrent GI bleed this admission. Initial melena workup lead to capsule endoscopy on 6/14/2021 which revealed active bleeding in small bowel.  Single balloon 6/15/2021 revealed old blood in proximal ileum. 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  # LVAD on anticoagulation with coumadin  # Trach  # Tension pneumo s/p chest tube placement 7/26  # Septic shock - 2/2 serratia bacteremia 2/2 cholecystitis  # Acalculous cholecystitis - s/p perc dawn  #S/p Cdiff infection    Recommendations:  - c/w PPI BID  - c/w with TF as patient allows, with TID reglan to help with motility given critical illness and likely component of gastroparesis  - no further GI w/u needed at this time, please call back with questions    Thank you for involving us in the care of this patient. Please reach out if any further questions.    Pj Trevino, PGY-5  GI Fellow    Available on [employer] Teams  Pager [phone] ([facility]) or [phone] ([facility])  After 5PM/Weekends, please contact the on-call GI fellow: [phone]  Available through [employer] Teams     66M Hx stage D NICM s/p HM2 on 9/2017 as DT (due to severe PAD) with TV ring, prior COVID-19 infection 4/2020, recurrent syncope post LVAD s/p ILR, and chronic abdominal pain with prior negative workup who was admitted with symptomatic anemia and supratherapeutic INR.     # Nausea and vomiting - 2 episodes overnight, now resolved with TF held. Low suspicion for obstruction. KUB normal. Nausea resolved, TF restarted. Suspect some underlying component of gastroparesis - possibly worsened by underlying infection or uncontrolled glucose. Recommending to continue tube feeds at rate that is tolerable. Continue with Reglan.  #Abd pain: intermittent. Inconsistent history regarding abdominal pain (reports location at times at per dawn/LVAD site, other times epigastric region, notes left sided pain many years prior // endorses chronicity possibly many years prior vs noted to be possibly x 1 week according to team // unclear if associated with TF). Now having brown BMs, no further recent bleeding. Ddx unclear source of pain -- possibly MSK at site of perc dawn drain/LVAD pump vs can consider esophagitis in setting of long standing NGT vs IBS vs unlikely biliary in nature (perc dawn draining well, normal LFTs, no collection on imaging) vs gastroparesis/ileus vs unlikely chronic mesenteric ischemia (vessels patent on imaging) vs unlikely SMA syndrome (borderline stenosis on doppler)  # Acute blood loss anemia and melena - resolved, Likely had recurrent GI bleed this admission. Initial melena workup lead to capsule endoscopy on 6/14/2021 which revealed active bleeding in small bowel.  Single balloon 6/15/2021 revealed old blood in proximal ileum. 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  # LVAD on anticoagulation with coumadin  # Trach  # Tension pneumo s/p chest tube placement 7/26  # Septic shock - 2/2 serratia bacteremia 2/2 cholecystitis  # Acalculous cholecystitis - s/p perc dawn  #S/p Cdiff infection    Recommendations:  - c/w PPI BID  - c/w with TF as patient allows, with TID reglan to help with motility given critical illness and likely component of gastroparesis  - no further GI w/u needed at this time, please call back with questions    Thank you for involving us in the care of this patient. Please reach out if any further questions.    Pj Trevino, PGY-5  GI Fellow    Available on [employer] Teams  Pager [phone] ([facility]) or [phone] ([facility])  After 5PM/Weekends, please contact the on-call GI fellow: [phone]  Available through [employer] Teams     66M Hx stage D NICM s/p HM2 on 9/2017 as DT (due to severe PAD) with TV ring, prior COVID-19 infection 4/2020, recurrent syncope post LVAD s/p ILR, and chronic abdominal pain with prior negative workup who was admitted with symptomatic anemia and supratherapeutic INR.     # Nausea and vomiting - 2 episodes overnight, now resolved with TF held. Low suspicion for obstruction. KUB normal. Nausea resolved, TF restarted. Suspect some underlying component of gastroparesis - possibly worsened by underlying infection or uncontrolled glucose. Recommending to continue tube feeds at rate that is tolerable. Continue with Reglan.  #Abd pain: intermittent. Inconsistent history regarding abdominal pain (reports location at times at per dawn/LVAD site, other times epigastric region, notes left sided pain many years prior // endorses chronicity possibly many years prior vs noted to be possibly x 1 week according to team // unclear if associated with TF). Now having brown BMs, no further recent bleeding. Ddx unclear source of pain -- possibly MSK at site of perc dawn drain/LVAD pump vs can consider esophagitis in setting of long standing NGT vs IBS vs unlikely biliary in nature (perc dawn draining well, normal LFTs, no collection on imaging) vs gastroparesis/ileus vs unlikely chronic mesenteric ischemia (vessels patent on imaging) vs unlikely SMA syndrome (borderline stenosis on doppler)  # Acute blood loss anemia and melena - resolved, Likely had recurrent GI bleed this admission. Initial melena workup lead to capsule endoscopy on 6/14/2021 which revealed active bleeding in small bowel.  Single balloon 6/15/2021 revealed old blood in proximal ileum. 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  # LVAD on anticoagulation with coumadin  # Trach  # Tension pneumo s/p chest tube placement 7/26  # Acalculous cholecystitis - s/p perc dawn - elevated liver tests noted - mainly elevated ALP. PTC draining. Bilirubin wnl. Can consider repeat RUQ US.   #S/p Cdiff infection    Recommendations:  - c/w PPI BID  - c/w with TF as patient allows, with TID reglan to help with motility given critical illness and likely component of gastroparesis  - Consider RUQ US for elevated ALP  - no further GI w/u needed at this time, please call back with questions    Thank you for involving us in the care of this patient. Please reach out if any further questions.    Pj Trevino, PGY-5  GI Fellow    Available on [employer] Teams  Pager [phone] ([facility]) or [phone] ([facility])  After 5PM/Weekends, please contact the on-call GI fellow: [phone]  Available through [employer] Teams     66M Hx stage D NICM s/p HM2 on 9/2017 as DT (due to severe PAD) with TV ring, prior COVID-19 infection 4/2020, recurrent syncope post LVAD s/p ILR, and chronic abdominal pain with prior negative workup who was admitted with symptomatic anemia and supratherapeutic INR.     # Nausea and vomiting - 2 episodes overnight, now resolved with TF held. Low suspicion for obstruction. KUB normal. Nausea resolved, TF restarted. Suspect some underlying component of gastroparesis - possibly worsened by underlying infection or uncontrolled glucose. Recommending to continue tube feeds at rate that is tolerable. Continue with Reglan.  #Abd pain: intermittent. Inconsistent history regarding abdominal pain (reports location at times at per dawn/LVAD site, other times epigastric region, notes left sided pain many years prior // endorses chronicity possibly many years prior vs noted to be possibly x 1 week according to team // unclear if associated with TF). Now having brown BMs, no further recent bleeding. Ddx unclear source of pain -- possibly MSK at site of perc dawn drain/LVAD pump vs can consider esophagitis in setting of long standing NGT vs IBS vs unlikely biliary in nature (perc dawn draining well, normal LFTs, no collection on imaging) vs gastroparesis/ileus vs unlikely chronic mesenteric ischemia (vessels patent on imaging) vs unlikely SMA syndrome (borderline stenosis on doppler)  # Acute blood loss anemia and melena - resolved, Likely had recurrent GI bleed this admission. Initial melena workup lead to capsule endoscopy on 6/14/2021 which revealed active bleeding in small bowel.  Single balloon 6/15/2021 revealed old blood in proximal ileum. 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  # LVAD on anticoagulation with coumadin  # Trach  # Tension pneumo s/p chest tube placement 7/26  # Acalculous cholecystitis - s/p perc dawn - elevated liver tests noted - mainly elevated ALP. PTC draining. Bilirubin wnl. Can consider repeat imaging  #S/p Cdiff infection  # Elevated liver enzymes - ?due to residual cholecystitis, vs cholestasis of sepsis    Recommendations:  - c/w PPI BID  - c/w with TF as patient allows, with TID reglan to help with motility given critical illness and likely component of gastroparesis  - Consider repeating 3D abdominal imaging  - no further GI endoscopic w/u needed at this time    Thank you for involving us in the care of this patient. Please reach out if any further questions.    Pj Trevino, PGY-5  GI Fellow    Available on [employer] Teams  Pager [phone] ([facility]) or [phone] ([facility])  After 5PM/Weekends, please contact the on-call GI fellow: [phone]  Available through [employer] Teams

## 2021-08-26 NOTE — PROGRESS NOTE ADULT - SUBJECTIVE AND OBJECTIVE BOX
RICKY HAMPTON  MRN-[id number]  Patient is a 66y old  Male who presents with a chief complaint of Anemia, Supratherapeutic INR, Dark Stools (26 Aug 2021 10:29)    HPI:  65M PMH ACC/AHA stage D HF due to NICM HM2 LVAD , TV annuloplasty ring 17 as destination therapy due to severe peripheral artery disease with significant stenosis  SIADH, Depression, CKD-3 with hyperkalemia, past E. coli UTIs, driveline drainage (21) and COVID-19 (back in 2020)  He was recently seen in clinic where he complained of abdominal pain and dark stools w constipation back in May. He presents to [facility] ER today weakness and fatigue, moderate and + Black stools for three days, on coumadin secondary to warfarin use in the setting of an LVAD. Patient has required transfusions for GIB in the past. Mostly recently back in 2021 pt had anemia with dark stools. No interventions was done at that time. However Last Endoscopy was done in 2020 (negative). Today labs show patient is anemic with H/H of 4.5/16.3,. INR is 8.84 MAP in the 90s, Temp 35.1. He denies any chest pain, shortness of breath, dizziness, abd pain, nausea or vomiting.     (2021 16:57)      Surgery/Hospital Course:   admit for melena w/ anemia, INR 8.84   6/15 Capsul study (+) for small bowel bleed, balloon endoscopy (old blood in prox ileum); post EGD - septic w/ L opacity, re-intubated for concern for aspiration, TTE (Mod MR, decrease biV w/ interventricular septum boweing towards R)   bronch    +C Diff    TC    CT C/A/P: Fluid filled colon which may be 2/2 rapid transit. Small bilateral pleffs with associates. Compressive atelectasis New ISABELLE & LLL  parenchymal opacities, suspicious for pneumonia. 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    #8 Shiley trach at bedside    CPAP trials    LVAD speed increased to 9200   Bronch; Central line dc'ed   TC since , continue as tolerated. Patient transferred to SDU.    Cont PT, no trach downsize today(#8cuffed)/ Anticoagulation/ Continue TF, speech f/u/ Vanco taper    oob to chair  INR  2.47  5 mg coumadin     increased lop to 25 q8  per HF   INR today 2.64.  H&H 7.3 this AM.  Will repeat CBC at noon, and will send stool guaiac Patient with persistent abdominal tenderness, rate of tube feeds decreased.  No nausea/vomiting.     INR today 2.4H&H 9.1.6 low flow overnight /N&V  NPO resolved for capsule study  Coumadin on hold refusing Tube feeds on D5 1/2 normal  @50 cc/hr   INR 2.69  H&H 7..1 refusing Tube feeds on D5 1/2 normal  @50 cc/hr. This am + BM Anusha Oneill HF  aware- PRBC x1  GI team consulted -  NPO  plan on study in am-  D/w Dr Cadet Patient  to return to CTU for further management; 1PRBCS    Post op INR 2.2 today.  No bleeding. BC + for SM.  Pt is hypotensive requiring pressor and inotropic support.  ID follow up today on Cefepime will follow.   R PTC for PTX    CT C/A/P: sub q emphysema in R chest wall, GGO RUL, small ascites CTH negative; Abd US: GB thickening, pericholecystic fluid     Perchole drain in place continues to drain total output overnight 133.  Fever today 38.8 given tylenol.  Will repeat BC now.     duplex LE negative    Patient with persistent abdominal pain, refusing tube feeds and medications, Psych consulted   CTA A/P ordered to r/o mesenteric ischemia 2/2 persistent anorexia, nausea, vomiting. Revealed:  Evaluation of the mesenteric vessels is limited by streak artifact from LVAD. There appears to be severe stenosis of the proximal SMA; abdominal mesenteric doppler is recommended for further evaluation. 2.  No small bowel findings to suggest acute mesenteric ischemia. 3.  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  8/15: Cultures resulted BC 1/2 +GPC in clusters, SC enterobacter; mesenteric duplex: borderline stenosis of proximal SMA     Today:  WBC rising 23.47->25.45, plan to send BCx and UA, will f/u. Consult vascular to discuss SMA stenosis for follow up. Ambulate as tolerated.     REVIEW OF SYSTEMS:  Patient speaks over trach   Gen: No fever  EYES/ENT: No visual changes;  No vertigo or throat pain   NECK: No pain   RES:  No shortness of breath or Cough  CARD: No chest pain   GI: No abdominal pain  : No dysuria  NEURO: No weakness  SKIN: No itching, rashes     ICU Vital Signs Last 24 Hrs  T(C): 36.2 (26 Aug 2021 08:00), Max: 37.5 (25 Aug 2021 12:00)  T(F): 97.2 (26 Aug 2021 08:00), Max: 99.5 (25 Aug 2021 12:00)  HR: 123 (26 Aug 2021 10:10) (107 - 151)  BP: --  BP(mean): --  ABP: 97/71 (26 Aug 2021 10:10) (70/58 - 129/85)  ABP(mean): 82 (26 Aug 2021 10:10) (64 - 105)  RR: 38 (26 Aug 2021 10:10) (18 - 54)  SpO2: 100% (26 Aug 2021 10:10) (93% - 100%)      Physical Exam:  Gen:  Sitting in chair in NAD. Awake and alert, NAD  Psych: Withdrawn, depressed mood.  CNS:  intact, nonfocal, responds to all commands  Neck: no JVD, +TC   RES : course breath sounds, no wheezing              CVS: +LVAD hum   Abd: Soft, minimally-moderately tender in RUQ by perc dawn site, NT everywhere else, positive BS throughout. Perc dawn drain site c/d/i draining bilious fluid.  Skin: No rash  Ext:  no edema    ============================I/O===========================   I&O's Detail    25 Aug 2021 07:01  -  26 Aug 2021 07:00  --------------------------------------------------------  IN:    Enteral Tube Flush: 240 mL    Esmolol: 37 mL    IV PiggyBack: 50 mL    Miscellaneous Tube Feedin mL  Total IN: 1377 mL    OUT:    Drain (mL): 250 mL    Emesis (mL): 625 mL    Stool (mL): 600 mL    Voided (mL): 400 mL  Total OUT: 1875 mL    Total NET: -498 mL      26 Aug 2021 07:01  -  26 Aug 2021 11:42  --------------------------------------------------------  IN:    Esmolol: 37 mL  Total IN: 37 mL    OUT:    IV PiggyBack: 0 mL    Miscellaneous Tube Feedin mL    Voided (mL): 200 mL  Total OUT: 200 mL    Total NET: -163 mL        ============================ LABS =========================                        9.6    25.45 )-----------( 330      ( 26 Aug 2021 01:00 )             30.7         x   |  x   |  x   ----------------------------<  x   5.1   |  x   |  x     Ca    10.7<H>      26 Aug 2021 01:00  Phos  3.6       Mg     2.2         TPro  9.7<H>  /  Alb  3.6  /  TBili  0.6  /  DBili  x   /  AST  53<H>  /  ALT  68<H>  /  AlkPhos  332<H>      LIVER FUNCTIONS - ( 26 Aug 2021 01:00 )  Alb: 3.6 g/dL / Pro: 9.7 g/dL / ALK PHOS: 332 U/L / ALT: 68 U/L / AST: 53 U/L / GGT: x             ABG - ( 26 Aug 2021 00:31 )  pH, Arterial: 7.41  pH, Blood: x     /  pCO2: 49    /  pO2: 216   / HCO3: 31    / Base Excess: 5.6   /  SaO2: 100.0               ======================Micro/Rad/Cardio=================  Culture: Reviewed   CXR: Reviewed  Echo:Reviewed  ======================================================  PAST MEDICAL & SURGICAL HISTORY:  CHF (congestive heart failure)    CAD (coronary artery disease)    Depression    Pleural effusion    History of  novel coronavirus disease (COVID-19)  2020    Hemorrhoids    Bleeding hemorrhoids    Peripheral arterial disease    Claudication    BPH with urinary obstruction    ACC/AHA stage D systolic heart failure    Anticoagulation goal of INR 2.0 to 2.5    Falls    Clavicle fracture    CKD (chronic kidney disease), stage III    Iron deficiency anemia    H/O epistaxis    Vertigo    GI bleed    S/P TVR (tricuspid valve repair)    S/P ventricular assist device    S/P endoscopy      ====================ASSESSMENT ==============  Stage D Nonischemic Cardiomyopathy, Status Post HM2 on 2017    Cardiogenic shock  Hemodynamic instability   Acute hypoxemic respiratory failure  GI bleed , Status Post Enteroscopy   Anemia, in setting of melena   Chronic Kidney Disease  Stress hyperglycemia   C.diff positive on    Hypovolemic shock  Septic shock  Leukocytosis    Plan:  ====================== NEUROLOGY=====================  Nonfocal, continue to monitor neuro status   Tylenol PRN for analgesia   C/w mirtazapine and sertraline for depression  PT/Ambulate as tolerated    acetaminophen    Suspension .. 650 milliGRAM(s) Oral every 6 hours PRN Mild Pain (1 - 3)  mirtazapine 7.5 milliGRAM(s) Oral daily  sertraline 50 milliGRAM(s) Oral daily    ==================== RESPIRATORY======================  Acute hypoxemic respiratory failure s/p #8 shiley trach on , continue TC as tolerated (TC since )  Continue close monitoring of respiratory rate and breathing pattern, following of ABG’s with A-line monitoring, continuous pulse oximetry monitoring.   Suction secretions prn     ====================CARDIOVASCULAR==================  Stage D Nonischemic Cardiomyopathy, Status Post HM2 on 2017; LVAD settings 9200, flow 5.8  TTE : reveals at least moderate MR, mild AI, severe global LV syst dysfxn, dec RV fxn   Continue invasive hemodynamic monitoring   Propranolol for rate control of HR 2/2 Hyperthyroidism, titrate as tolerated  Blood pressure support with Losartan and IV Esmolol     esMOLOL  Infusion 100 MICROgram(s)/kG/Min (37 mL/Hr) IV Continuous <Continuous>  losartan 25 milliGRAM(s) Oral daily  propranolol 30 milliGRAM(s) Oral every 8 hours    ===================HEMATOLOGIC/ONC ===================  Acute blood loss anemia, monitor H&H/Plts    Continue monitor LDH daily     heparin   Injectable 5000 Unit(s) SubCutaneous every 8 hours    ===================== RENAL =========================  Continue monitoring urine output, I&OS, BUN/Cr   Replete lytes PRN. Keep K> 4 and Mg >2     ==================== GASTROINTESTINAL===================  S/p cholecystostomy tube placed on : with IR with -60cc of black bile, will monitor site closely.   Recent emesis on  in setting of abdominal pain, as per GI likely component of illeus given critical illness   CTA A/P 8/13: Evaluation of the mesenteric vessels is limited by streak artifact from LVAD. There appears to be severe stenosis of the proximal SMA; abdominal mesenteric doppler is recommended for further evaluation. 2.  No small bowel findings to suggest acute mesenteric ischemia. 3.  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  Mesenteric duplex on 8/15 borderline stenosis of proximal SMA - will consult vascular today for follow up  Tolerating TwoCal HN feeds @ goal 50cc/hr   Zofran PRN for nausea   Reglan for gut motility     multivitamin 1 Tablet(s) Oral daily  GI prophylaxis, pantoprazole  Injectable 40 milliGRAM(s) IV Push every 12 hours  thiamine 100 milliGRAM(s) Oral daily  metoclopramide Injectable 10 milliGRAM(s) IV Push every 8 hours  ondansetron Injectable 4 milliGRAM(s) IV Push every 6 hours PRN Nausea and/or Vomiting    =======================    ENDOCRINE  =====================  Stress hyperglycemia, continue glucose control with admelog sliding scale   Thyroid US to evaluate for thyroid nodule/goiter in setting of hyperthyroid  - contraindicated at this time 2/2 trach/will do when trach is out  Hyperthyroidism, Methimazole decreased to 15mg in setting of elevated LFTs   Also started on 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 as per house endocrinology     insulin lispro (ADMELOG) corrective regimen sliding scale   SubCutaneous every 6 hours  insulin NPH human recombinant 4 Unit(s) SubCutaneous every 6 hours  methimazole 15 milliGRAM(s) Oral daily  predniSONE  Solution 40 milliGRAM(s) Oral daily    ========================INFECTIOUS DISEASE================  cefepime   IVPB 1000 milliGRAM(s) IV Intermittent every 8 hours  tobramycin for Nebulization 300 milliGRAM(s) Inhalation every 12 hours  vancomycin    Solution 125 milliGRAM(s) Oral every 6 hours      Patient requires continuous monitoring with bedside rhythm monitoring, pulse ox monitoring, and intermittent blood gas analysis. Care plan discussed with ICU care team. Patient remained critical and at risk for life threatening decompensation.     By signing my name below, I, Agnieszka Cherry, attest that this documentation has been prepared under the direction and in the presence of Jaclyn Mendoza NP   Electronically signed: Cesia Shaffer, 21 @ 11:42    I, Jaclyn Mendoza, personally performed the services described in this documentation. all medical record entries made by the luisibe were at my direction and in my presence. I have reviewed the chart and agree that the record reflects my personal performance and is accurate and complete  Electronically signed: Jaclyn Mendoza NP        RICKY HAMPTON  MRN-[id number]  Patient is a 66y old  Male who presents with a chief complaint of Anemia, Supratherapeutic INR, Dark Stools (26 Aug 2021 10:29)    HPI:  65M PMH ACC/AHA stage D HF due to NICM HM2 LVAD , TV annuloplasty ring 17 as destination therapy due to severe peripheral artery disease with significant stenosis  SIADH, Depression, CKD-3 with hyperkalemia, past E. coli UTIs, driveline drainage (21) and COVID-19 (back in 2020)  He was recently seen in clinic where he complained of abdominal pain and dark stools w constipation back in May. He presents to [facility] ER today weakness and fatigue, moderate and + Black stools for three days, on coumadin secondary to warfarin use in the setting of an LVAD. Patient has required transfusions for GIB in the past. Mostly recently back in 2021 pt had anemia with dark stools. No interventions was done at that time. However Last Endoscopy was done in 2020 (negative). Today labs show patient is anemic with H/H of 4.5/16.3,. INR is 8.84 MAP in the 90s, Temp 35.1. He denies any chest pain, shortness of breath, dizziness, abd pain, nausea or vomiting.     (2021 16:57)      Surgery/Hospital Course:   admit for melena w/ anemia, INR 8.84   6/15 Capsul study (+) for small bowel bleed, balloon endoscopy (old blood in prox ileum); post EGD - septic w/ L opacity, re-intubated for concern for aspiration, TTE (Mod MR, decrease biV w/ interventricular septum boweing towards R)   bronch    +C Diff    TC    CT C/A/P: Fluid filled colon which may be 2/2 rapid transit. Small bilateral pleffs with associates. Compressive atelectasis New ISABELLE & LLL  parenchymal opacities, suspicious for pneumonia. 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    #8 Shiley trach at bedside    CPAP trials    LVAD speed increased to 9200   Bronch; Central line dc'ed   TC since , continue as tolerated. Patient transferred to SDU.    Cont PT, no trach downsize today(#8cuffed)/ Anticoagulation/ Continue TF, speech f/u/ Vanco taper    oob to chair  INR  2.47  5 mg coumadin     increased lop to 25 q8  per HF   INR today 2.64.  H&H 7.3 this AM.  Will repeat CBC at noon, and will send stool guaiac Patient with persistent abdominal tenderness, rate of tube feeds decreased.  No nausea/vomiting.     INR today 2.4H&H 9.1.6 low flow overnight /N&V  NPO resolved for capsule study  Coumadin on hold refusing Tube feeds on D5 1/2 normal  @50 cc/hr   INR 2.69  H&H 7..1 refusing Tube feeds on D5 1/2 normal  @50 cc/hr. This am + BM Anusha Oneill HF  aware- PRBC x1  GI team consulted -  NPO  plan on study in am-  D/w Dr Cadet Patient  to return to CTU for further management; 1PRBCS    Post op INR 2.2 today.  No bleeding. BC + for SM.  Pt is hypotensive requiring pressor and inotropic support.  ID follow up today on Cefepime will follow.   R PTC for PTX    CT C/A/P: sub q emphysema in R chest wall, GGO RUL, small ascites CTH negative; Abd US: GB thickening, pericholecystic fluid     Perchole drain in place continues to drain total output overnight 133.  Fever today 38.8 given tylenol.  Will repeat BC now.     duplex LE negative    Patient with persistent abdominal pain, refusing tube feeds and medications, Psych consulted   CTA A/P ordered to r/o mesenteric ischemia 2/2 persistent anorexia, nausea, vomiting. Revealed:  Evaluation of the mesenteric vessels is limited by streak artifact from LVAD. There appears to be severe stenosis of the proximal SMA; abdominal mesenteric doppler is recommended for further evaluation. 2.  No small bowel findings to suggest acute mesenteric ischemia. 3.  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  8/15: Cultures resulted BC 1/2 +GPC in clusters, SC enterobacter; mesenteric duplex: borderline stenosis of proximal SMA     Today:  WBC rising 23.47->25.45, plan to send BCx and UA, will f/u. RE-Consult vascular to discuss SMA stenosis for follow up. Ambulate as tolerated.     REVIEW OF SYSTEMS:  Patient speaks over trach   Gen: No fever  EYES/ENT: No visual changes;  No vertigo or throat pain   NECK: No pain   RES:  No shortness of breath or Cough  CARD: No chest pain   GI: No abdominal pain  : No dysuria  NEURO: No weakness  SKIN: No itching, rashes     ICU Vital Signs Last 24 Hrs  T(C): 36.2 (26 Aug 2021 08:00), Max: 37.5 (25 Aug 2021 12:00)  T(F): 97.2 (26 Aug 2021 08:00), Max: 99.5 (25 Aug 2021 12:00)  HR: 123 (26 Aug 2021 10:10) (107 - 151)  BP: --  BP(mean): --  ABP: 97/71 (26 Aug 2021 10:10) (70/58 - 129/85)  ABP(mean): 82 (26 Aug 2021 10:10) (64 - 105)  RR: 38 (26 Aug 2021 10:10) (18 - 54)  SpO2: 100% (26 Aug 2021 10:10) (93% - 100%)      Physical Exam:  Gen:  Sitting in chair in NAD. Awake and alert, NAD  Psych: Withdrawn, depressed mood.  CNS:  intact, nonfocal, responds to all commands  Neck: no JVD, +TC   RES : course breath sounds, no wheezing              CVS: +LVAD hum   Abd: Soft, minimally-moderately tender in RUQ by perc dawn site, NT everywhere else, positive BS throughout. Perc dawn drain site c/d/i draining bilious fluid.  Skin: No rash  Ext:  no edema    ============================I/O===========================   I&O's Detail    25 Aug 2021 07:01  -  26 Aug 2021 07:00  --------------------------------------------------------  IN:    Enteral Tube Flush: 240 mL    Esmolol: 37 mL    IV PiggyBack: 50 mL    Miscellaneous Tube Feedin mL  Total IN: 1377 mL    OUT:    Drain (mL): 250 mL    Emesis (mL): 625 mL    Stool (mL): 600 mL    Voided (mL): 400 mL  Total OUT: 1875 mL    Total NET: -498 mL      26 Aug 2021 07:01  -  26 Aug 2021 11:42  --------------------------------------------------------  IN:    Esmolol: 37 mL  Total IN: 37 mL    OUT:    IV PiggyBack: 0 mL    Miscellaneous Tube Feedin mL    Voided (mL): 200 mL  Total OUT: 200 mL    Total NET: -163 mL        ============================ LABS =========================                        9.6    25.45 )-----------( 330      ( 26 Aug 2021 01:00 )             30.7         x   |  x   |  x   ----------------------------<  x   5.1   |  x   |  x     Ca    10.7<H>      26 Aug 2021 01:00  Phos  3.6       Mg     2.2         TPro  9.7<H>  /  Alb  3.6  /  TBili  0.6  /  DBili  x   /  AST  53<H>  /  ALT  68<H>  /  AlkPhos  332<H>      LIVER FUNCTIONS - ( 26 Aug 2021 01:00 )  Alb: 3.6 g/dL / Pro: 9.7 g/dL / ALK PHOS: 332 U/L / ALT: 68 U/L / AST: 53 U/L / GGT: x             ABG - ( 26 Aug 2021 00:31 )  pH, Arterial: 7.41  pH, Blood: x     /  pCO2: 49    /  pO2: 216   / HCO3: 31    / Base Excess: 5.6   /  SaO2: 100.0               ======================Micro/Rad/Cardio=================  Culture: Reviewed   CXR: Reviewed  Echo:Reviewed  ======================================================  PAST MEDICAL & SURGICAL HISTORY:  CHF (congestive heart failure)    CAD (coronary artery disease)    Depression    Pleural effusion    History of  novel coronavirus disease (COVID-19)  2020    Hemorrhoids    Bleeding hemorrhoids    Peripheral arterial disease    Claudication    BPH with urinary obstruction    ACC/AHA stage D systolic heart failure    Anticoagulation goal of INR 2.0 to 2.5    Falls    Clavicle fracture    CKD (chronic kidney disease), stage III    Iron deficiency anemia    H/O epistaxis    Vertigo    GI bleed    S/P TVR (tricuspid valve repair)    S/P ventricular assist device    S/P endoscopy      ====================ASSESSMENT ==============  Stage D Nonischemic Cardiomyopathy, Status Post HM2 on 2017    Cardiogenic shock  Hemodynamic instability   Acute hypoxemic respiratory failure  GI bleed , Status Post Enteroscopy   Anemia, in setting of melena   Chronic Kidney Disease  Stress hyperglycemia   C.diff positive on    Hypovolemic shock  Septic shock  Leukocytosis    Plan:  ====================== NEUROLOGY=====================  Nonfocal, continue to monitor neuro status   Tylenol PRN for analgesia   C/w mirtazapine and sertraline for depression  PT/Ambulate as tolerated    acetaminophen    Suspension .. 650 milliGRAM(s) Oral every 6 hours PRN Mild Pain (1 - 3)  mirtazapine 7.5 milliGRAM(s) Oral daily  sertraline 50 milliGRAM(s) Oral daily    ==================== RESPIRATORY======================  Acute hypoxemic respiratory failure s/p #8 shiley trach on , continue TC as tolerated (TC since )  Continue close monitoring of respiratory rate and breathing pattern, following of ABG’s with A-line monitoring, continuous pulse oximetry monitoring.   Suction secretions prn     ====================CARDIOVASCULAR==================  Stage D Nonischemic Cardiomyopathy, Status Post HM2 on 2017; LVAD settings 9200, flow 5.8  TTE : reveals at least moderate MR, mild AI, severe global LV syst dysfxn, dec RV fxn   Continue invasive hemodynamic monitoring   Propranolol for rate control of HR 2/2 Hyperthyroidism, titrate as tolerated  Blood pressure/HR support with Losartan and IV Esmolol     esMOLOL  Infusion 100 MICROgram(s)/kG/Min (37 mL/Hr) IV Continuous <Continuous>  losartan 25 milliGRAM(s) Oral daily  propranolol 30 milliGRAM(s) Oral every 8 hours    ===================HEMATOLOGIC/ONC ===================  Acute blood loss anemia, monitor H&H/Plts    Continue monitor LDH daily     heparin   Injectable 5000 Unit(s) SubCutaneous every 8 hours    ===================== RENAL =========================  Continue monitoring urine output, I&OS, BUN/Cr   Replete lytes PRN. Keep K> 4 and Mg >2     ==================== GASTROINTESTINAL===================  S/p cholecystostomy tube placed on : with IR with -60cc of black bile, will monitor site closely.   Recent emesis on  in setting of abdominal pain, as per GI likely component of illeus given critical illness   CTA A/P : Evaluation of the mesenteric vessels is limited by streak artifact from LVAD. There appears to be severe stenosis of the proximal SMA; abdominal mesenteric doppler is recommended for further evaluation. 2.  No small bowel findings to suggest acute mesenteric ischemia. 3.  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  Mesenteric duplex on 8/15 borderline stenosis of proximal SMA - will consult vascular today for follow up  Tolerating TwoCal HN feeds @ goal 50cc/hr   Zofran PRN for nausea   Reglan for gut motility     multivitamin 1 Tablet(s) Oral daily  GI prophylaxis, pantoprazole  Injectable 40 milliGRAM(s) IV Push every 12 hours  thiamine 100 milliGRAM(s) Oral daily  metoclopramide Injectable 10 milliGRAM(s) IV Push every 8 hours  ondansetron Injectable 4 milliGRAM(s) IV Push every 6 hours PRN Nausea and/or Vomiting    =======================    ENDOCRINE  =====================  Stress hyperglycemia, continue glucose control with admelog sliding scale   Thyroid US to evaluate for thyroid nodule/goiter in setting of hyperthyroid  - contraindicated at this time 2/2 trach/will do when trach is out  Hyperthyroidism, Methimazole decreased to 15mg in setting of elevated LFTs   Also started on 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 as per house endocrinology     insulin lispro (ADMELOG) corrective regimen sliding scale   SubCutaneous every 6 hours  insulin NPH human recombinant 4 Unit(s) SubCutaneous every 6 hours  methimazole 15 milliGRAM(s) Oral daily  predniSONE  Solution 40 milliGRAM(s) Oral daily    ========================INFECTIOUS DISEASE================  cefepime   IVPB 1000 milliGRAM(s) IV Intermittent every 8 hours  tobramycin for Nebulization 300 milliGRAM(s) Inhalation every 12 hours  vancomycin    Solution 125 milliGRAM(s) Oral every 6 hours      Patient requires continuous monitoring with bedside rhythm monitoring, pulse ox monitoring, and intermittent blood gas analysis. Care plan discussed with ICU care team. Patient remained critical and at risk for life threatening decompensation.     By signing my name below, I, Agnieszka Dilip, attest that this documentation has been prepared under the direction and in the presence of Jaclyn Mendoza NP   Electronically signed: Cesia Shaffer, 21 @ 11:42    I, Jaclyn Mendoza, personally performed the services described in this documentation. all medical record entries made by the scribe were at my direction and in my presence. I have reviewed the chart and agree that the record reflects my personal performance and is accurate and complete  Electronically signed: Jaclyn Mendoza NP

## 2021-08-26 NOTE — CHART NOTE - NSCHARTNOTEFT_GEN_A_CORE
Nutrition Follow Up Note  Patient seen for: Verbal consult for EN recommendations    Chart reviewed, events noted. 66M, PMH ACC/AHA stage D HF 2/2 NICM HM2 LVAD, TV annuloplasty ring 17 as destination therapy due to severe PAD with significant stenosis  SIADH, Depression, CKD-3, COVID-19, here initially for GIB prolonged hospital course, s/p tracheostomy, acalculous cholecystitis s/p perc dawn. Patient c persistent intermittent abdominal pain. Per Team, pt has recently been refusing tube feeds and is being evaluated for chronic mesenteric ischemia vs SMA syndrome. Tolerating intermittent TC since     Source: EMR, Team/HF Rounds    Diet, NPO with Tube Feed:   Tube Feeding Modality: Nasogastric  TwoCal HN (TWOCALHNRTH)  Total Volume for 24 Hours (mL): 1080  Continuous  Starting Tube Feed Rate {mL per Hour}: 10  Increase Tube Feed Rate by (mL): 10     Every 6 hours  Until Goal Tube Feed Rate (mL per Hour): 45  Tube Feed Duration (in Hours): 24  Tube Feed Start Time: 13:25 (21 @ 13:37)    Current EN regimen provides: 1080ml formula, 2160kcals, 91g protein, and 756ml free H2O    Feeds held this AM 2/2 vomiting. Discussed with team changing formula to Vital 1.5 (see recommendations below)  EN provisions per chart:     () 100% EN goal volume achieved     () 89% EN goal volume achieved     () 86% EN goal volume achieved       Nutrition-related concerns:  - Pt previously refusing PEG feeds; has been tolerating current regimen  - Biliary drain in place  - Reglan ordered daily  - Multivitamin and thiamin supplementation ordered daily  - NPH & Insulin Sliding Scale ordered to optimize BG; noted to be receiving Prednisone    GI:  Last BM .   Bowel Regimen? [] Yes   [x] No  - Yesterday pt noted with loose stool - Banatrol was initiated; emesis x2 in the last 24hrs  - Noted pt on abx course  - Zofran ordered PRN    Weight history:   - Admission weight: 176.3 pounds / 80 kg (6/15)  significant wt loss noted; wt was trending up, now trending back down - to be addressed with EN provisions    Weight in k (), 62.4 (), 66.2 (), 65.9 (-17), 64.9 (-16), 62.1 (-15), 63.6 (), 67.7 (), 71.2 ()    MEDICATIONS  (STANDING):  cefepime   IVPB  cefepime   IVPB  esMOLOL  Infusion  insulin lispro (ADMELOG) corrective regimen sliding scale  insulin NPH human recombinant  losartan  methimazole  multivitamin  pantoprazole  Injectable  predniSONE  Solution  propranolol  thiamine  tobramycin for Nebulization  vancomycin    Solution    Pertinent Labs:  @ 04:51: Na --, BUN --, Cr --, BG --, K+ 5.1, Phos --, Mg --, Alk Phos --, ALT/SGPT --, AST/SGOT --, HbA1c --   @ 01:00: Na 129<L>, BUN 32<H>, Cr 0.49<L>, <H>, K+ 5.3, Phos 3.6, Mg 2.2, Alk Phos 332<H>, ALT/SGPT 68<H>, AST/SGOT 53<H>, HbA1c --    A1C with Estimated Average Glucose Result: 5.4 % (08-15-21 @ 13:52)  A1C with Estimated Average Glucose Result: 5.6 % (06-15-21 @ 02:42)    Finger Sticks:  POCT Blood Glucose.: 118 mg/dL ( @ 11:40)  POCT Blood Glucose.: 165 mg/dL ( @ 06:53)  POCT Blood Glucose.: 264 mg/dL ( @ 00:24)  POCT Blood Glucose.: 205 mg/dL ( @ 17:38)    Triglycerides, Serum: 141 mg/dL (08-15-21 @ 13:53)    Skin per nursing documentation: no pressure injuries   Edema: none at this time     Estimated Nutrition Needs:   Using dosing weight 78.5 kg  Energy Needs (25-30 kcal/kg): 3296-8656 kcal/day  Protein Needs (1.2-1.4 g/kg):     Previous Nutrition Diagnosis: Severe chronic malnutrition  Nutrition diagnosis is ongoing & addressed with tube feeds as tolerated    No new nutrition diagnosis at this time    Recommendations:    1. Recommend changing EN regimen to Vital 1.5; initiate @10ml/hr and increase by 10ml q4hrs until goal rate of 65ml/hr x 24hrs. At goal to provide 1560ml formula, 2340kcals, 105g protein, 1192ml free H2O (meets ~30kcals/kg & 1.33g protein/kg based on dosing wt 78.5kg).  2. Continue micronutrient supplementation as ordered  3. RD to remain available to adjust EN formulary, volume/rate PRN.     Monitoring and Evaluation:   Continue to monitor Nutritional intake, Tolerance to diet prescription, weights, labs, skin integrity  RD remains available upon request and will follow up per protocol     Wendy Travis, MS, RD, CDN, CNSC  pager #[phone] DISPLAY PLAN FREE TEXT DISPLAY PLAN FREE TEXT DISPLAY PLAN FREE TEXT

## 2021-08-27 NOTE — PROGRESS NOTE ADULT - SUBJECTIVE AND OBJECTIVE BOX
RICKY HAMPTON  MRN-[id number]  Patient is a 66y old  Male who presents with a chief complaint of Anemia, Supratherapeutic INR, Dark Stools (26 Aug 2021 14:01)    HPI:  65M PMH ACC/AHA stage D HF due to NICM HM2 LVAD , TV annuloplasty ring 17 as destination therapy due to severe peripheral artery disease with significant stenosis  SIADH, Depression, CKD-3 with hyperkalemia, past E. coli UTIs, driveline drainage (21) and COVID-19 (back in 2020)  He was recently seen in clinic where he complained of abdominal pain and dark stools w constipation back in May. He presents to [facility] ER today weakness and fatigue, moderate and + Black stools for three days, on coumadin secondary to warfarin use in the setting of an LVAD. Patient has required transfusions for GIB in the past. Mostly recently back in 2021 pt had anemia with dark stools. No interventions was done at that time. However Last Endoscopy was done in 2020 (negative). Today labs show patient is anemic with H/H of 4.5/16.3,. INR is 8.84 MAP in the 90s, Temp 35.1. He denies any chest pain, shortness of breath, dizziness, abd pain, nausea or vomiting.       (2021 16:57)      Surgery/Hospital Course:   admit for melena w/ anemia, INR 8.84   6/15 Capsul study (+) for small bowel bleed, balloon endoscopy (old blood in prox ileum); post EGD - septic w/ L opacity, re-intubated for concern for aspiration, TTE (Mod MR, decrease biV w/ interventricular septum boweing towards R)   bronch    +C Diff    TC    CT C/A/P: Fluid filled colon which may be 2/2 rapid transit. Small bilateral pleffs with associates. Compressive atelectasis New ISABELLE & LLL  parenchymal opacities, suspicious for pneumonia. 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    #8 Shiley trach at bedside    CPAP trials    LVAD speed increased to 9200   Bronch; Central line dc'ed   TC since , continue as tolerated. Patient transferred to SDU.    Cont PT, no trach downsize today(#8cuffed)/ Anticoagulation/ Continue TF, speech f/u/ Vanco taper    oob to chair  INR  2.47  5 mg coumadin     increased lop to 25 q8  per HF   INR today 2.64.  H&H 7.3 this AM.  Will repeat CBC at noon, and will send stool guaiac Patient with persistent abdominal tenderness, rate of tube feeds decreased.  No nausea/vomiting.     INR today 2.4H&H 9.1.6 low flow overnight /N&V  NPO resolved for capsule study  Coumadin on hold refusing Tube feeds on D5 1/2 normal  @50 cc/hr   INR 2.69  H&H 7..1 refusing Tube feeds on D5 1/2 normal  @50 cc/hr. This am + BM Anusha Oneill HF  aware- PRBC x1  GI team consulted -  NPO  plan on study in am-  D/w Dr Cadet Patient  to return to CTU for further management; 1PRBCS    Post op INR 2.2 today.  No bleeding. BC + for SM.  Pt is hypotensive requiring pressor and inotropic support.  ID follow up today on Cefepime will follow.   R PTC for PTX    CT C/A/P: sub q emphysema in R chest wall, GGO RUL, small ascites CTH negative; Abd US: GB thickening, pericholecystic fluid     Perchole drain in place continues to drain total output overnight 133.  Fever today 38.8 given tylenol.  Will repeat BC now.     duplex LE negative    Patient with persistent abdominal pain, refusing tube feeds and medications, Psych consulted   CTA A/P ordered to r/o mesenteric ischemia 2/2 persistent anorexia, nausea, vomiting. Revealed:  Evaluation of the mesenteric vessels is limited by streak artifact from LVAD. There appears to be severe stenosis of the proximal SMA; abdominal mesenteric doppler is recommended for further evaluation. 2.  No small bowel findings to suggest acute mesenteric ischemia. 3.  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  8/15: Cultures resulted BC 1/2 +GPC in clusters, SC enterobacter; mesenteric duplex: borderline stenosis of proximal SMA     Today:   Worsening secretions, continue suctioning prn and inhaled tobramycin. Pending repeat cultures sent yesterday. Advance for post pyloric feeding tube and continue to monitor closely for episodes of emesis. If emesis persists, will discuss ?intervention for SMA stenosis? with vascular. CT C/A/P ordered for further evaluation, will f/u. D/c Losartan. Continue ambulation/PT as tolerated.     REVIEW OF SYSTEMS:  Patient speaks over trach   Gen: No fever  EYES/ENT: No visual changes;  No vertigo or throat pain   NECK: No pain   RES:  No shortness of breath or Cough  CARD: No chest pain   GI: No abdominal pain  : No dysuria  NEURO: No weakness  SKIN: No itching, rashes     ICU Vital Signs Last 24 Hrs  T(C): 36.4 (27 Aug 2021 08:00), Max: 36.7 (26 Aug 2021 12:00)  T(F): 97.6 (27 Aug 2021 08:00), Max: 98 (26 Aug 2021 12:00)  HR: 111 (27 Aug 2021 10:10) (86 - 130)  BP: --  BP(mean): --  ABP: 105/79 (27 Aug 2021 10:10) (69/62 - 105/79)  ABP(mean): 91 (27 Aug 2021 10:10) (66 - 93)  RR: 38 (27 Aug 2021 10:10) (10 - 42)  SpO2: 100% (27 Aug 2021 10:10) (97% - 100%)      Physical Exam:  Gen:  Sitting in chair in NAD. Awake and alert, NAD  Psych: Withdrawn, depressed mood.  CNS:  intact, nonfocal, responds to all commands  Neck: no JVD, +TC   RES : course breath sounds, no wheezing              CVS: +LVAD hum   Abd: Soft, minimally-moderately tender in RUQ by perc dawn site, NT everywhere else, positive BS throughout. Perc dawn drain site c/d/i draining bilious fluid.  Skin: No rash  Ext:  no edema    ============================I/O===========================   I&O's Detail    26 Aug 2021 07:01  -  27 Aug 2021 07:00  --------------------------------------------------------  IN:    Enteral Tube Flush: 100 mL    Esmolol: 37 mL    IV PiggyBack: 100 mL    Miscellaneous Tube Feedin mL  Total IN: 627 mL    OUT:    Drain (mL): 150 mL    Voided (mL): 1150 mL  Total OUT: 1300 mL    Total NET: -673 mL      27 Aug 2021 07:01  -  27 Aug 2021 11:00  --------------------------------------------------------  IN:    multiple electrolytes Injection Type 1.: 100 mL  Total IN: 100 mL    OUT:    Drain (mL): 25 mL    Emesis (mL): 1 mL    Esmolol: 0 mL    Voided (mL): 550 mL  Total OUT: 576 mL    Total NET: -476 mL        ============================ LABS =========================                        9.1    32.37 )-----------( 355      ( 27 Aug 2021 01:30 )             29.2         132<L>  |  94<L>  |  56<H>  ----------------------------<  145<H>  4.7   |  25  |  0.83    Ca    11.1<H>      27 Aug 2021 01:26  Phos  5.5       Mg     2.5         TPro  9.2<H>  /  Alb  3.7  /  TBili  0.8  /  DBili  x   /  AST  58<H>  /  ALT  87<H>  /  AlkPhos  312<H>      LIVER FUNCTIONS - ( 27 Aug 2021 01:26 )  Alb: 3.7 g/dL / Pro: 9.2 g/dL / ALK PHOS: 312 U/L / ALT: 87 U/L / AST: 58 U/L / GGT: x             ABG - ( 27 Aug 2021 00:43 )  pH, Arterial: 7.39  pH, Blood: x     /  pCO2: 50    /  pO2: 140   / HCO3: 30    / Base Excess: 4.6   /  SaO2: 99.9              Urinalysis Basic - ( 26 Aug 2021 17:32 )    Color: Dark Yellow / Appearance: Slightly Turbid / S.037 / pH: x  Gluc: x / Ketone: Trace  / Bili: Negative / Urobili: Negative   Blood: x / Protein: 100 / Nitrite: Negative   Leuk Esterase: Negative / RBC: 1 /hpf / WBC 1 /HPF   Sq Epi: x / Non Sq Epi: 2 / Bacteria: Negative      ======================Micro/Rad/Cardio=================  Culture: Reviewed   CXR: Reviewed  Echo:Reviewed  ======================================================  PAST MEDICAL & SURGICAL HISTORY:  CHF (congestive heart failure)    CAD (coronary artery disease)    Depression    Pleural effusion    History of 2019 novel coronavirus disease (COVID-19)  2020    Hemorrhoids    Bleeding hemorrhoids    Peripheral arterial disease    Claudication    BPH with urinary obstruction    ACC/AHA stage D systolic heart failure    Anticoagulation goal of INR 2.0 to 2.5    Falls    Clavicle fracture    CKD (chronic kidney disease), stage III    Iron deficiency anemia    H/O epistaxis    Vertigo    GI bleed    S/P TVR (tricuspid valve repair)    S/P ventricular assist device    S/P endoscopy      ====================ASSESSMENT ==============  Stage D Nonischemic Cardiomyopathy, Status Post HM2 on 2017    Cardiogenic shock  Hemodynamic instability   Acute hypoxemic respiratory failure  GI bleed , Status Post Enteroscopy   Anemia, in setting of melena   Chronic Kidney Disease  Stress hyperglycemia   C.diff positive on    Hypovolemic shock  Septic shock  Leukocytosis      Plan:  ====================== NEUROLOGY=====================  Nonfocal, continue to monitor neuro status   Tylenol PRN for analgesia   C/w mirtazapine and sertraline for depression  PT/Ambulate as tolerated    acetaminophen    Suspension .. 650 milliGRAM(s) Oral every 6 hours PRN Mild Pain (1 - 3)  mirtazapine 7.5 milliGRAM(s) Oral daily  sertraline 50 milliGRAM(s) Oral daily    ==================== RESPIRATORY======================  Acute hypoxemic respiratory failure s/p #8 shiley trach on , continue TC as tolerated (TC since )  Continue close monitoring of respiratory rate and breathing pattern, following of ABG’s with A-line monitoring, continuous pulse oximetry monitoring.   Worsening secretions, continue suctioning prn and inhaled tobramycin     ====================CARDIOVASCULAR==================  Stage D Nonischemic Cardiomyopathy, Status Post HM2 on 2017; LVAD settings 9200, flow 5.7  TTE : reveals at least moderate MR, mild AI, severe global LV syst dysfxn, dec RV fxn   Continue invasive hemodynamic monitoring   Propranolol for rate control of HR 2/2 Hyperthyroidism, titrate as tolerated  Losartan dc'd this AM    propranolol 30 milliGRAM(s) Oral every 8 hours    ===================HEMATOLOGIC/ONC ===================  Acute blood loss anemia, monitor H&H/Plts    Continue monitor LDH daily     VTE prophylaxis, heparin   Injectable 5000 Unit(s) SubCutaneous every 8 hours    ===================== RENAL =========================   Continue monitoring urine output, I&OS, BUN/Cr   Replete lytes PRN. Keep K> 4 and Mg >2     ==================== GASTROINTESTINAL===================  S/p cholecystostomy tube placed on : with IR with -60cc of black bile, will monitor site closely.   Recent emesis on  in setting of abdominal pain, as per GI likely component of illeus given critical illness   CTA A/P : Evaluation of the mesenteric vessels is limited by streak artifact from LVAD. There appears to be severe stenosis of the proximal SMA; abdominal mesenteric doppler is recommended for further evaluation. 2.  No small bowel findings to suggest acute mesenteric ischemia. 3.  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  Mesenteric duplex on 8/15 borderline stenosis of proximal SMA   Tolerating Vital 1.5 @ goal 65cc/hr   Advance for post pyloric feeding tube and continue to monitor closely for episodes of emesis  If emesis persists, will discuss ?intervention for SMA stenosis? with vascular. CT C/A/P ordered for further evaluation, will f/u.   Zofran PRN for nausea   Reglan for gut motility     multiple electrolytes Injection Type 1 1000 milliLiter(s) (50 mL/Hr) IV Continuous <Continuous>  multivitamin 1 Tablet(s) Oral daily  GI prophylaxis, pantoprazole  Injectable 40 milliGRAM(s) IV Push every 12 hours  thiamine 100 milliGRAM(s) Oral daily  metoclopramide Injectable 10 milliGRAM(s) IV Push every 8 hours  ondansetron Injectable 4 milliGRAM(s) IV Push every 6 hours PRN Nausea and/or Vomiting    =======================    ENDOCRINE  =====================  Stress hyperglycemia, continue glucose control with admelog sliding scale and NPH   Thyroid US to evaluate for thyroid nodule/goiter in setting of hyperthyroid  - contraindicated at this time 2/2 trach/will do when trach is out  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 C/w cefepime.  Vancomycin  PO for C.diff prophylaxis   Inhaled tobramycin for increased sections     Pending repeat cultures sent yesterday     cefepime   IVPB 1000 milliGRAM(s) IV Intermittent every 8 hours  tobramycin for Nebulization 300 milliGRAM(s) Inhalation every 12 hours  vancomycin    Solution 125 milliGRAM(s) Oral every 6 hours      Patient requires continuous monitoring with bedside rhythm monitoring, pulse ox monitoring, and intermittent blood gas analysis. Care plan discussed with ICU care team. Patient remained critical and at risk for life threatening decompensation.     By signing my name below, IAgnieszka, attest that this documentation has been prepared under the direction and in the presence of CLAUDIA Alfaro   Electronically signed: Cesia Shaffer, 21 @ 11:00    I, Bertrand Turk, personally performed the services described in this documentation. all medical record entries made by the luisibmel were at my direction and in my presence. I have reviewed the chart and agree that the record reflects my personal performance and is accurate and complete  Electronically signed: CLAUDIA Alfaro

## 2021-08-27 NOTE — PROGRESS NOTE ADULT - SUBJECTIVE AND OBJECTIVE BOX
RICKY JOINT  MRN#: [id number]  Subjective:  Pulmonary progress  : recurrent Acute hypoxic respiratory Failure ,aspiration pneumonia, NICM  , chart reviewed and H/O obtained radiological and Laboratory study reviewed patient Examined     65M PMH ACC/AHA stage D HF due to NICM HM2 LVAD , TV annuloplasty ring 17 as destination therapy due to severe peripheral artery disease with significant stenosis  SIADH, Depression, CKD-3 with hyperkalemia, past E. coli UTIs, driveline drainage (21) and COVID-19 (back in 2020)  He was recently seen in clinic where he complained of abdominal pain and dark stools w constipation back in May. He presents to [facility] ER today weakness and fatigue, moderate and + Black stools for three days, on coumadin secondary to warfarin use in the setting of an LVAD. Patient has required transfusions for GIB in the past. Mostly recently back in 2021 pt had anemia with dark stools. No interventions was done at that time. However Last Endoscopy was done in 2020 (negative). Today labs show patient is anemic with H/H of 4.5/16.3,. INR is 8.84 MAP in the 90s, Temp 35.1. He denies any chest pain, shortness of breath, dizziness, abd pain, nausea or vomiting. found to have  rectal bleeding underwent endoscopy ,old blood in the proximal ileum ,  develop sepsis with LL opacity given Antibiotics , Extubated , reintubated , Bronchoscopy on Zosyn for LL pneumonia  and Amiodarone S/P TV Annuloplasty , patient remain intubated on full ventilatory support .S/P multiple units of blood transfusion , remain on full ventilatory support on Precedex and propofol , new central IJ line , diarrhea C diff. +ve on po vancomycin and IV Flagyl,  mildly distended belly , fever start on cefepime 2gm q 8 hrs S/P tracheostomy .  new RT Subclavian central line continue on contact  isolation ,still diarrhea on C-diff antibiotics ENT follow up appreciated , trial of C-PAP as tolerated , , copious secretion from trach. site chest x ray left lower lobe pneumonia , tolerating trch. collar 50% FI02 still excessive secretion need pulmonary toilet , off Ancef antibiotic , no more diarrhea back on full support mechanical ventilator , chest x ray show improvement in LLL air space disease, more awake and responsive on tube feeding no more diarrhea , S/P  Ancef for bacteremia no fever ,ID follow up noted ,  no nausea or vomiting or diarrhea still very weak and tired , event note pulled NG tube now replaced , back on tube feeding ,still on po vancomycin , getting PT and OT at bed side , no plan for decannulation for now. , no more diarrhea receiving PT and OPT at bed side , minimal secretion from tracheostomy site , no SOB getting stronger , improve muscle tone patient transfer to monitor bed still on contact isolation for C-Difficel colitis on 50% FI02, NG tube clogged , and change to resume tube feeding still loose stool . H/H drop significantly require blood transfusion , most likely GI bleeding , IV heparin D/C , vomiting 200 cc of creamy color tube feeding on hold no sob, still melena monitor in the CTU possible capsule endoscopy , H/H is stable ., patient develop TR sided  pneumothorax require chest tube placement , RT IJ central line  placed , develop fever shaking chills , blood culture positive for serratia marcescens , start on cefepime .the patient  become hypoxic and hypotensive placed on full ventilatory support and Vasopressin , levophed and Dobutamine ,S/P blood transfusion on meropenem and vancomycin ,   , on and  off pressors , occasional agitation on Precedex .S/P IR cholecystostomy tube drainage placement in the RT upper Quadrant , resume anticoagulation chest x ray noted C-PAP trail lasted only for 2 hrs , new RT SC line and D/C RT IJ line , RT pig tail cathter has been removed , tolerating C-PAP trial placed on trach. collar 50% FI02 GI consultant noted on NG tube feeding as tolerated , develop AF RVR S/P  bolus Amiodarone  back to regular sinus Rhythm , Flat Affect depressed , back on tube feeding Vital AF at 60 cc/ hr .still intermittent abdominal pain , no fever saturation is accepted  back on full ventilatory support , tired and sleepy on round  .start  back on  tube feeding . tolerating it very well , no nausea or vomiting , good 02 saturation on trac-collar on methimazole for hyperthyroidism , no new C/O no plan for decannulation yet , electrolyte blood test is still pending .on respiratory isolation sputum culture is negative , increase WBC etiology is ?       (2021 16:57)    PAST MEDICAL & SURGICAL HISTORY:  CHF (congestive heart failure)    CAD (coronary artery disease)  Depression    Pleural effusion    History of 2019 novel coronavirus disease (COVID-19)  2020    Hemorrhoids    Bleeding hemorrhoids    Peripheral arterial disease    Claudication    BPH with urinary obstruction    ACC/AHA stage D systolic heart failure  Anticoagulation goal of INR 2.0 to 2.5    Falls    Clavicle fracture    CKD (chronic kidney disease), stage III    Iron deficiency anemia    H/O epistaxis    Vertigo    GI bleed    S/P TVR (tricuspid valve repair)    S/P ventricular assist device    S/P endoscopy    OBJECTIVE:  ICU Vital Signs Last 24 Hrs  T(C): 38.2 (2021 10:00), Max: 38.5 (2021 12:00)  T(F): 100.8 (2021 10:00), Max: 101.3 (2021 12:00)  HR: 65 (2021 10:00) (61 - 69)  BP: --  BP(mean): --  ABP: 105/67 (2021 10:00) (90/54 - 113/64)  ABP(mean): 77 (2021 10:00) (63 - 77)  RR: 20 (2021 10:00) (19 - 35)  SpO2: 99% (2021 10:00) (96% - 100%)       @ 07: @ 07:00  --------------------------------------------------------  IN: 2693.9 mL / OUT: 1415 mL / NET: 1278.9 mL     @ 07: @ 10:49  --------------------------------------------------------  IN: 420.8 mL / OUT: 115 mL / NET: 305.8 mL  PHYSICAL EXAM:Daily   Elderly male S/P tracheostomy on trach. collar 50% FI02 ,  Daily Weight in k.4 (2021 00:00)  HEENT:     + NCAT  + EOMI  - Conjuctival edema   - Icterus   - Thrush   - ETT  + NGT/OGT  Neck:         + FROM  RT IJ  line JVD  - Nodes - Masses + Mid-line trachea + Tracheostomy  Chest:            normal A-P diameter    Lungs:          + CTA   + Rhonchi    - Rales    - Wheezing + Decreased  LT BS   - Dullness R L  Cardiac:       + S1 + S2    + RRR   - Irregular   - S3  - S4    - Murmurs   - Rub   - Hamman’s sign   Abdomen:    + BS  + Soft + Non-tender - Distended - Organomegaly - PEG .cholecystostomy tube in place  Extremities:   - Cyanosis U/L   - Clubbing  U/L  + LE/UE Edema   + Capillary refill    + Pulses   Neuro:        - Awake   -  Alert   - Confused   - Lethargic   + Sedated  + Generalized Weakness  Skin:        - Rashes    - Erythema   + Normal incisions   + IV sites intact          HOSPITAL MEDICATIONS: All medications reviewed and analyzed  MEDICATIONS  (STANDING):  amiodarone    Tablet 200 milliGRAM(s) Oral daily  chlorhexidine 0.12% Liquid 15 milliLiter(s) Oral Mucosa every 12 hours  chlorhexidine 2% Cloths 1 Application(s) Topical <User Schedule>  dexmedetomidine Infusion 0.5 MICROgram(s)/kG/Hr (9.81 mL/Hr) IV Continuous <Continuous>  dextrose 50% Injectable 50 milliLiter(s) IV Push every 15 minutes  heparin  Infusion 400 Unit(s)/Hr (12.5 mL/Hr) IV Continuous <Continuous>  Hydromorphone  Injectable 0.5 milliGRAM(s) IV Push once  insulin lispro (ADMELOG) corrective regimen sliding scale   SubCutaneous every 6 hours  pantoprazole  Injectable 40 milliGRAM(s) IV Push every 12 hours  piperacillin/tazobactam IVPB.. 3.375 Gram(s) IV Intermittent every 8 hours  propofol Infusion 20 MICROgram(s)/kG/Min (9.42 mL/Hr) IV Continuous <Continuous>  sodium chloride 0.9% lock flush 3 milliLiter(s) IV Push every 8 hours  sodium chloride 0.9%. 1000 milliLiter(s) (10 mL/Hr) IV Continuous <Continuous>    MEDICATIONS  (PRN):  acetaminophen    Suspension .. 650 milliGRAM(s) Oral every 6 hours PRN Temp greater or equal to 38C (100.4F)    LABS: All Lab data reviewed and analyzed                                   91    32.37 )-----------( 355      ( 27 Aug 2021 01:30 )             29.2                   132<L>  |  94<L>  |  56<H>  ----------------------------<  145<H>  4.7   |  25  |  0.83    Ca    11.1<H>      27 Aug 2021 01:26  Phos  5.5       Mg     2.5         TPro  9.2<H>  /  Alb  3.7  /  TBili  0.8  /  DBili  x   /  AST  58<H>  /  ALT  87<H>  /  AlkPhos  312<H>      x   |  x   |  x   ----------------------------<  x   5.1   |  x   |  x     Ca    10.7<H>      26 Aug 2021 01:00  Phos  3.6       Mg     2.2         TPro  9.7<H>  /  Alb  3.6  /  TBili  0.6  /  DBili  x   /  AST  53<H>  /  ALT  68<H>  /  AlkPhos  332<H>        Ca    11.0<H>      25 Aug 2021 00:59  Phos  4.5       Mg     2.0         TPro  9.6<H>  /  Alb  3.8  /  TBili  0.6  /  DBili  x   /  AST  48<H>  /  ALT  53<H>  /  AlkPhos  316<H>      Ca    10.1      24 Aug 2021 00:47  Phos  3.6     08-  Mg     1.8         TPro  8.8<H>  /  Alb  3.5  /  TBili  0.4  /  DBili  x   /  AST  30  /  ALT  35  /  AlkPhos  250<H>      Ca    9.8      23 Aug 2021 00:36  Phos  3.2     08-  Mg     1.8         TPro  8.4<H>  /  Alb  3.3  /  TBili  0.3  /  DBili  x   /  AST  26  /  ALT  35  /  AlkPhos  217<H>                                                                                                     PTT - ( 2021 04:52 )  PTT:45.2 sec LIVER FUNCTIONS - ( 2021 00:42 )  Alb: 3.4 g/dL / Pro: 6.7 g/dL / ALK PHOS: 213 U/L / ALT: 15 U/L / AST: 24 U/L / GGT: x           RADIOLOGY: - Reviewed and analyzed RT Pig tail cathter  , LVAD HM2, CT scan of abdomen reviewed result noted

## 2021-08-27 NOTE — PROGRESS NOTE ADULT - ASSESSMENT
Assessment and Recommendation:   · Assessment	  Assessment and recommendation :  Recurrent Acute hypoxic respiratory Failure S/P tracheostomy on trach. collar  50% FI02,   Acute blood loss anemia S/P multiple  blood transfusion   S/P septic shock off vasopressin , levophed and off  Dobutamine   S/P cholecystostomy tube placement by IR    AF RVR back to regular sinus Rhythm   Non ischemic cardiomyopathy continue ACE inhibitor and B-Blockers   S/P Septic shock and cardiogenic shock   Stage D systolic heart failure S/P LVAD HM2   MH2 LVAD  with  TV Annuloplasty  hyponatremia fluid restriction improved   increase WBC'S ?   Severe peripheral vascular disease   no evidence of significant mesenteric thrombosis   severe hyperglycemia on insulin coverage    hyperthyroidism on Methimazole 10mg TID   critical care polyneuropathy   Anemia of Acute blood Loss   severe protein caloric malnutrition   S/P Small bowel bleeding   S/P blood and FFP transfusion   Chronic kidney disease stage III  resume  NGT feeding .tolerating it well   Decannulation ?  GI prophylaxis with PPI

## 2021-08-27 NOTE — PROGRESS NOTE ADULT - ASSESSMENT
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 coli UTIs, driveline drainage (1/7/21) and COVID-19, here initially for GIB prolonged hospital course, s/p tracheostomy, acalculous cholecystitis s/p perc danw. Patient has persistent intermittent abdominal pain. Per CTU team, pt has recently been refusing tube feeds and is being evaluated for chronic mesenteric ischemia vs SMA syndrome.  8/13 bld cxs positive. Consulted for TPN. Prealb 11, prior a1c 5.6, tg 141. 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 Tolerating feeds but with intermittent abd pain. TF now held 2/2 vomiting    -Pt tolerating tube feeds as per CTU team, no present plan to initiate TPN, will reassess need pending clinical course.  -Nutritional Assessment, obtain prealbumin.  -Abdominal pain/vomiting- f/u axr; reglan per gi; planned for vascular f/u  -Anemia- rec iron supp when feasible  -Electrolyte Imbalance Risk. Obtain daily lytes and CMP, replete as needed per CTU, hyponatremia- consider urine lytes, trend labs  -Leukocytosis- on abx, f/u cxs, w/u per ID/primary  -Psych, palliative care, and ethics input noted  -Management per CTU; will remain available as needed, discussed above with bedside CTU ACP.    discussed with ANNE Tang, agreeable with above    TPN pager [phone], spectra [phone]  M-F 6A-4P, Weekends and holidays 8A-12P

## 2021-08-27 NOTE — PROGRESS NOTE ADULT - ASSESSMENT
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 coli UTIs, driveline drainage (1/7/21) and COVID-19 (back in April 2020)  He was recently seen in clinic where he complained of abdominal pain and dark stools w constipation back in May. He presents to [facility] ER today weakness and fatigue, moderate and + Black stools for three days, on coumadin secondary to warfarin use in the setting of an LVAD. Patient has required transfusions for GIB in the past. Mostly recently back in jan 2021 pt had anemia with dark stools. No interventions was done at that time. However Last Endoscopy was done in July 2020 (negative). Today labs show patient is anemic with H/H of 4.5/16.3,. INR is 8.84 MAP in the 90s,   Returned from endoscopy appearing ill tachypneic with chills with new white out of his left lung    Remains with complaints of abdominal pain  leukocytosis   tan secretions from trach site-  sputum culture with normal cornelia  completing ten days of IV Cefepime for enterobacter bacteremia- can discontinue    Newly diagnosed tyrotoxicosis starting methimazole and steroids  possibly explaining ongoing abdominal pains    Morris Prater MD  [phone]  After 5pm/weekends [phone]          Morris Prater MD  [phone]  After 5pm/weekends [phone]   65M PMH ACC/AHA stage D HF due to NICM HM2 LVAD , TV annuloplasty ring 9/12/17 as destination therapy due to severe peripheral artery disease with significant stenosis  SIADH, Depression, CKD-3 with hyperkalemia, past E. coli UTIs, driveline drainage (1/7/21) and COVID-19 (back in April 2020)  He was recently seen in clinic where he complained of abdominal pain and dark stools w constipation back in May. He presents to [facility] ER today weakness and fatigue, moderate and + Black stools for three days, on coumadin secondary to warfarin use in the setting of an LVAD. Patient has required transfusions for GIB in the past. Mostly recently back in jan 2021 pt had anemia with dark stools. No interventions was done at that time. However Last Endoscopy was done in July 2020 (negative). Today labs show patient is anemic with H/H of 4.5/16.3,. INR is 8.84 MAP in the 90s,   Returned from endoscopy appearing ill tachypneic with chills with new white out of his left lung    Remains with complaints of abdominal pain  leukocytosis significant 30k range   tan secretions from trach site-  recent sputum culture with normal cornelia  completing ten days of IV Cefepime for enterobacter bacteremia- can discontinue  nodular upper lobe infiltrates seen on CT scan  would send trach secretions for fungal stain/culture  AFB stain and culture  check fungitell        Morris Prater MD  [phone]  After 5pm/weekends [phone]          Morris Prater MD  [phone]  After 5pm/weekends [phone]

## 2021-08-27 NOTE — PROGRESS NOTE ADULT - SUBJECTIVE AND OBJECTIVE BOX
[facility] NUTRITION SUPPORT--  Attending/ PA FOLLOW UP NOTE      24 hour events/subjective: TPN originally consulted for nutrition support on 8/15.  Pt however is tolerating TFs, and is not requiring TPN.      PAST HISTORY  --------------------------------------------------------------------------------  No significant changes to PMH, PSH, FHx, SHx, unless otherwise noted    ALLERGIES & MEDICATIONS  --------------------------------------------------------------------------------  Allergies    No Known Allergies    Intolerances      Standing Inpatient Medications  cefepime   IVPB      cefepime   IVPB 1000 milliGRAM(s) IV Intermittent every 8 hours  chlorhexidine 2% Cloths 1 Application(s) Topical <User Schedule>  heparin   Injectable 5000 Unit(s) SubCutaneous every 8 hours  insulin lispro (ADMELOG) corrective regimen sliding scale   SubCutaneous every 6 hours  insulin NPH human recombinant 4 Unit(s) SubCutaneous every 6 hours  methimazole 15 milliGRAM(s) Oral daily  metoclopramide Injectable 10 milliGRAM(s) IV Push every 8 hours  mirtazapine 7.5 milliGRAM(s) Oral daily  multiple electrolytes Injection Type 1 1000 milliLiter(s) IV Continuous <Continuous>  multivitamin 1 Tablet(s) Oral daily  pantoprazole  Injectable 40 milliGRAM(s) IV Push every 12 hours  predniSONE  Solution 40 milliGRAM(s) Oral daily  propranolol 30 milliGRAM(s) Oral every 8 hours  sertraline 50 milliGRAM(s) Oral daily  thiamine 100 milliGRAM(s) Oral daily  tobramycin for Nebulization 300 milliGRAM(s) Inhalation every 12 hours  vancomycin    Solution 125 milliGRAM(s) Oral every 6 hours    PRN Inpatient Medications  acetaminophen    Suspension .. 650 milliGRAM(s) Oral every 6 hours PRN  ondansetron Injectable 4 milliGRAM(s) IV Push every 6 hours PRN      REVIEW OF SYSTEMS  --------------------------------------------------------------------------------  Gen: as per HPI  Skin: No rashes  Head/Eyes/Ears/Mouth: No headache;No sore throat  Respiratory: No dyspnea, cough,   CV: No chest pain, PND, orthopnea  GI: as per HPI  : No increased frequency, dysuria, hematuria, nocturia  MSK: No joint pain/swelling; no back pain; no edema  Neuro: No dizziness/lightheadedness, weakness, seizures, numbness, tingling  Psych: No significant nervousness, anxiety, stress, depression    All other systems were reviewed and are negative, except as noted.      LABS/STUDIES  --------------------------------------------------------------------------------              9.1    32.37 >-----------<  355      [08-27-21 @ 01:30]              29.2     132  |  94  |  56  ----------------------------<  145      [08-27-21 @ 01:26]  4.7   |  25  |  0.83        Ca     11.1     [08-27-21 @ 01:26]      Mg     2.5     [08-27-21 @ 01:26]      Phos  5.5     [08-27-21 @ 01:26]    TPro  9.2  /  Alb  3.7  /  TBili  0.8  /  DBili  x   /  AST  58  /  ALT  87  /  AlkPhos  312  [08-27-21 @ 01:26]              [08-27-21 @ 01:26]    Blood Gas Arterial - Calcium, Ionized: 1.41 mmol/L (08-27-21 @ 00:43)  Blood Gas Arterial - Calcium, Ionized: 1.35 mmol/L (08-26-21 @ 00:31)    Glucose, Serum: 145 mg/dL (08-27-21 @ 01:26)    Prealbumin, Serum: 11 mg/dL (08-16-21 @ 04:01)  Prealbumin, Serum: 10 mg/dL (08-15-21 @ 13:53)          08-26-21 @ 07:01  -  08-27-21 @ 07:00  --------------------------------------------------------  IN: 627 mL / OUT: 1300 mL / NET: -673 mL    08-27-21 @ 07:01  -  08-27-21 @ 13:41  --------------------------------------------------------  IN: 250 mL / OUT: 576 mL / NET: -326 mL        VITALS/PHYSICAL EXAM  --------------------------------------------------------------------------------  T(C): 36.8 (08-27-21 @ 12:00), Max: 36.8 (08-27-21 @ 12:00)  HR: 121 (08-27-21 @ 13:00) (86 - 130)  BP: --  RR: 28 (08-27-21 @ 13:00) (10 - 42)  SpO2: 100% (08-27-21 @ 13:00) (97% - 100%)  Wt(kg): --    Weight (kg): 61.6 (08-26-21 @ 00:00)    Physical Exam:    Gen: lying in bed  HEENT: +trach +ngt   Neck: trachea midline, no noted JVD  Chest: respirations non labored  AbdL ND NT soft  Neuro: awake alert responsive    .  .

## 2021-08-27 NOTE — PROGRESS NOTE ADULT - SUBJECTIVE AND OBJECTIVE BOX
INFECTIOUS DISEASES FOLLOW UP-- Anitra Prater  [phone]    This is a follow up note for this  66yMale with  Anemia    Acute cholecystitis        ROS:  CONSTITUTIONAL:  No fever, good appetite  CARDIOVASCULAR:  No chest pain or palpitations  RESPIRATORY:  No dyspnea  GASTROINTESTINAL:  No nausea, vomiting, diarrhea, or abdominal pain  GENITOURINARY:  No dysuria  NEUROLOGIC:  No headache,     Allergies    No Known Allergies    Intolerances        ANTIBIOTICS/RELEVANT:  antimicrobials  cefepime   IVPB      cefepime   IVPB 1000 milliGRAM(s) IV Intermittent every 8 hours  tobramycin for Nebulization 300 milliGRAM(s) Inhalation every 12 hours  vancomycin    Solution 125 milliGRAM(s) Oral every 6 hours    immunologic:    OTHER:  acetaminophen    Suspension .. 650 milliGRAM(s) Oral every 6 hours PRN  chlorhexidine 2% Cloths 1 Application(s) Topical <User Schedule>  heparin   Injectable 5000 Unit(s) SubCutaneous every 8 hours  insulin lispro (ADMELOG) corrective regimen sliding scale   SubCutaneous every 6 hours  insulin NPH human recombinant 4 Unit(s) SubCutaneous every 6 hours  methimazole 15 milliGRAM(s) Oral daily  metoclopramide Injectable 10 milliGRAM(s) IV Push every 8 hours  mirtazapine 7.5 milliGRAM(s) Oral daily  multiple electrolytes Injection Type 1 1000 milliLiter(s) IV Continuous <Continuous>  multivitamin 1 Tablet(s) Oral daily  ondansetron Injectable 4 milliGRAM(s) IV Push every 6 hours PRN  pantoprazole  Injectable 40 milliGRAM(s) IV Push every 12 hours  predniSONE  Solution 40 milliGRAM(s) Oral daily  propranolol 30 milliGRAM(s) Oral every 8 hours  sertraline 50 milliGRAM(s) Oral daily  thiamine 100 milliGRAM(s) Oral daily      Objective:  Vital Signs Last 24 Hrs  T(C): 36.1 (27 Aug 2021 16:00), Max: 36.8 (27 Aug 2021 12:00)  T(F): 97 (27 Aug 2021 16:00), Max: 98.3 (27 Aug 2021 12:00)  HR: 86 (27 Aug 2021 17:24) (86 - 124)  BP: --  BP(mean): --  RR: 28 (27 Aug 2021 17:00) (10 - 42)  SpO2: 97% (27 Aug 2021 17:24) (97% - 100%)    PHYSICAL EXAM:  Constitutional:no acute distress  Eyes:ALONSO, EOMI  Ear/Nose/Throat: no oral lesions, 	  Respiratory: clear BL  Cardiovascular: S1S2  Gastrointestinal:soft, (+) BS, no tenderness  Extremities:no e/e/c  No Lymphadenopathy  IV sites not inflammed.    LABS:                        9.1    32.37 )-----------( 355      ( 27 Aug 2021 01:30 )             29.2         132<L>  |  94<L>  |  56<H>  ----------------------------<  145<H>  4.7   |  25  |  0.83    Ca    11.1<H>      27 Aug 2021 01:26  Phos  5.5       Mg     2.5         TPro  9.2<H>  /  Alb  3.7  /  TBili  0.8  /  DBili  x   /  AST  58<H>  /  ALT  87<H>  /  AlkPhos  312<H>        Urinalysis Basic - ( 26 Aug 2021 17:32 )    Color: Dark Yellow / Appearance: Slightly Turbid / S.037 / pH: x  Gluc: x / Ketone: Trace  / Bili: Negative / Urobili: Negative   Blood: x / Protein: 100 / Nitrite: Negative   Leuk Esterase: Negative / RBC: 1 /hpf / WBC 1 /HPF   Sq Epi: x / Non Sq Epi: 2 / Bacteria: Negative        MICROBIOLOGY:            RECENT CULTURES:   @ 17:32  .Sputum Sputum  --  --  --    Normal Respiratory Bria present  --   @ 23:18  .Blood Blood-Peripheral  --  --  --    No Growth Final  --      RADIOLOGY & ADDITIONAL STUDIES:  < from: CT Abdomen and Pelvis No Cont (21 @ 16:06) >  IMPRESSION:    Nodular opacities in the right lung apex with central lucency/cavitation which may be infectious or inflammatory in etiology. Bilateral tree-in-bud opacities and mucoid impaction in the right lower lobe airways.    No acute pathology in the abdomen and pelvis.    < end of copied text >   INFECTIOUS DISEASES FOLLOW UP-- Anitra Prater  [phone]    This is a follow up note for this  66yMale with  Anemia    Acute cholecystitis        ROS:  CONSTITUTIONAL:  No fever, pain with NG feedings  CARDIOVASCULAR:  No chest pain or palpitations  RESPIRATORY:  No dyspnea  GASTROINTESTINAL:  No nausea, vomiting, diarrhea, notes abdominal pain  GENITOURINARY:  No dysuria  NEUROLOGIC:  No headache,     Allergies    No Known Allergies    Intolerances        ANTIBIOTICS/RELEVANT:  antimicrobials  cefepime   IVPB      cefepime   IVPB 1000 milliGRAM(s) IV Intermittent every 8 hours  tobramycin for Nebulization 300 milliGRAM(s) Inhalation every 12 hours  vancomycin    Solution 125 milliGRAM(s) Oral every 6 hours    immunologic:    OTHER:  acetaminophen    Suspension .. 650 milliGRAM(s) Oral every 6 hours PRN  chlorhexidine 2% Cloths 1 Application(s) Topical <User Schedule>  heparin   Injectable 5000 Unit(s) SubCutaneous every 8 hours  insulin lispro (ADMELOG) corrective regimen sliding scale   SubCutaneous every 6 hours  insulin NPH human recombinant 4 Unit(s) SubCutaneous every 6 hours  methimazole 15 milliGRAM(s) Oral daily  metoclopramide Injectable 10 milliGRAM(s) IV Push every 8 hours  mirtazapine 7.5 milliGRAM(s) Oral daily  multiple electrolytes Injection Type 1 1000 milliLiter(s) IV Continuous <Continuous>  multivitamin 1 Tablet(s) Oral daily  ondansetron Injectable 4 milliGRAM(s) IV Push every 6 hours PRN  pantoprazole  Injectable 40 milliGRAM(s) IV Push every 12 hours  predniSONE  Solution 40 milliGRAM(s) Oral daily  propranolol 30 milliGRAM(s) Oral every 8 hours  sertraline 50 milliGRAM(s) Oral daily  thiamine 100 milliGRAM(s) Oral daily      Objective:  Vital Signs Last 24 Hrs  T(C): 36.1 (27 Aug 2021 16:00), Max: 36.8 (27 Aug 2021 12:00)  T(F): 97 (27 Aug 2021 16:00), Max: 98.3 (27 Aug 2021 12:00)  HR: 86 (27 Aug 2021 17:24) (86 - 124)  BP: --  BP(mean): --  RR: 28 (27 Aug 2021 17:00) (10 - 42)  SpO2: 97% (27 Aug 2021 17:24) (97% - 100%)    PHYSICAL EXAM:  Constitutional:no acute distress interactive  Eyes:ALONSO, EOMI  Ear/Nose/Throat: no oral lesions, trach site secretions noted ,clear but thick	  Respiratory: coarse bilateral  Cardiovascular: S1S2 VAD sounds  Gastrointestinal:distended  VAD exit site dressing CDI  Extremities:no e/e/c  No Lymphadenopathy  IV sites not inflammed.    LABS:                        9.1    32.37 )-----------( 355      ( 27 Aug 2021 01:30 )             29.2         132<L>  |  94<L>  |  56<H>  ----------------------------<  145<H>  4.7   |  25  |  0.83    Ca    11.1<H>      27 Aug 2021 01:26  Phos  5.5       Mg     2.5         TPro  9.2<H>  /  Alb  3.7  /  TBili  0.8  /  DBili  x   /  AST  58<H>  /  ALT  87<H>  /  AlkPhos  312<H>        Urinalysis Basic - ( 26 Aug 2021 17:32 )    Color: Dark Yellow / Appearance: Slightly Turbid / S.037 / pH: x  Gluc: x / Ketone: Trace  / Bili: Negative / Urobili: Negative   Blood: x / Protein: 100 / Nitrite: Negative   Leuk Esterase: Negative / RBC: 1 /hpf / WBC 1 /HPF   Sq Epi: x / Non Sq Epi: 2 / Bacteria: Negative        MICROBIOLOGY:            RECENT CULTURES:   @ 17:32  .Sputum Sputum  --  --  --    Normal Respiratory Bria present  --   @ 23:18  .Blood Blood-Peripheral  --  --  --    No Growth Final  --      RADIOLOGY & ADDITIONAL STUDIES:  < from: CT Abdomen and Pelvis No Cont (21 @ 16:06) >  IMPRESSION:    Nodular opacities in the right lung apex with central lucency/cavitation which may be infectious or inflammatory in etiology. Bilateral tree-in-bud opacities and mucoid impaction in the right lower lobe airways.    No acute pathology in the abdomen and pelvis.    < end of copied text >

## 2021-08-28 NOTE — PROGRESS NOTE ADULT - SUBJECTIVE AND OBJECTIVE BOX
RICKY JOINT  MRN#: [id number]  Subjective:  Pulmonary progress  : recurrent Acute hypoxic respiratory Failure ,aspiration pneumonia, NICM  , chart reviewed and H/O obtained radiological and Laboratory study reviewed patient Examined     65M PMH ACC/AHA stage D HF due to NICM HM2 LVAD , TV annuloplasty ring 17 as destination therapy due to severe peripheral artery disease with significant stenosis  SIADH, Depression, CKD-3 with hyperkalemia, past E. coli UTIs, driveline drainage (21) and COVID-19 (back in 2020)  He was recently seen in clinic where he complained of abdominal pain and dark stools w constipation back in May. He presents to [facility] ER today weakness and fatigue, moderate and + Black stools for three days, on coumadin secondary to warfarin use in the setting of an LVAD. Patient has required transfusions for GIB in the past. Mostly recently back in 2021 pt had anemia with dark stools. No interventions was done at that time. However Last Endoscopy was done in 2020 (negative). Today labs show patient is anemic with H/H of 4.5/16.3,. INR is 8.84 MAP in the 90s, Temp 35.1. He denies any chest pain, shortness of breath, dizziness, abd pain, nausea or vomiting. found to have  rectal bleeding underwent endoscopy ,old blood in the proximal ileum ,  develop sepsis with LL opacity given Antibiotics , Extubated , reintubated , Bronchoscopy on Zosyn for LL pneumonia  and Amiodarone S/P TV Annuloplasty , patient remain intubated on full ventilatory support .S/P multiple units of blood transfusion , remain on full ventilatory support on Precedex and propofol , new central IJ line , diarrhea C diff. +ve on po vancomycin and IV Flagyl,  mildly distended belly , fever start on cefepime 2gm q 8 hrs S/P tracheostomy .  new RT Subclavian central line continue on contact  isolation ,still diarrhea on C-diff antibiotics ENT follow up appreciated , trial of C-PAP as tolerated , , copious secretion from trach. site chest x ray left lower lobe pneumonia , tolerating trch. collar 50% FI02 still excessive secretion need pulmonary toilet , off Ancef antibiotic , no more diarrhea back on full support mechanical ventilator , chest x ray show improvement in LLL air space disease, more awake and responsive on tube feeding no more diarrhea , S/P  Ancef for bacteremia no fever ,ID follow up noted ,  no nausea or vomiting or diarrhea still very weak and tired , event note pulled NG tube now replaced , back on tube feeding ,still on po vancomycin , getting PT and OT at bed side , no plan for decannulation for now. , no more diarrhea receiving PT and OPT at bed side , minimal secretion from tracheostomy site , no SOB getting stronger , improve muscle tone patient transfer to monitor bed still on contact isolation for C-Difficel colitis on 50% FI02, NG tube clogged , and change to resume tube feeding still loose stool . H/H drop significantly require blood transfusion , most likely GI bleeding , IV heparin D/C , vomiting 200 cc of creamy color tube feeding on hold no sob, still melena monitor in the CTU possible capsule endoscopy , H/H is stable ., patient develop TR sided  pneumothorax require chest tube placement , RT IJ central line  placed , develop fever shaking chills , blood culture positive for serratia marcescens , start on cefepime .the patient  become hypoxic and hypotensive placed on full ventilatory support and Vasopressin , levophed and Dobutamine ,S/P blood transfusion on meropenem and vancomycin ,   , on and  off pressors , occasional agitation on Precedex .S/P IR cholecystostomy tube drainage placement in the RT upper Quadrant , resume anticoagulation chest x ray noted C-PAP trail lasted only for 2 hrs , new RT SC line and D/C RT IJ line , RT pig tail cathter has been removed , tolerating C-PAP trial placed on trach. collar 50% FI02 GI consultant noted on NG tube feeding as tolerated , develop AF RVR S/P  bolus Amiodarone  back to regular sinus Rhythm , Flat Affect depressed , back on tube feeding Vital AF at 60 cc/ hr .still intermittent abdominal pain , no fever saturation is accepted  back on full ventilatory support , tired and sleepy on round  .start  back on  tube feeding . tolerating it very well , no nausea or vomiting , good 02 saturation on trac-collar on methimazole for hyperthyroidism , no new C/O no plan for decannulation yet , electrolyte blood test is still pending .on respiratory isolation sputum culture is negative , increase WBC  improved on cefepime , sputum positive for enterococcus         (2021 16:57)    PAST MEDICAL & SURGICAL HISTORY:  CHF (congestive heart failure)    CAD (coronary artery disease)  Depression    Pleural effusion    History of 2019 novel coronavirus disease (COVID-19)  2020    Hemorrhoids    Bleeding hemorrhoids    Peripheral arterial disease    Claudication    BPH with urinary obstruction    ACC/AHA stage D systolic heart failure  Anticoagulation goal of INR 2.0 to 2.5    Falls    Clavicle fracture    CKD (chronic kidney disease), stage III    Iron deficiency anemia    H/O epistaxis    Vertigo    GI bleed    S/P TVR (tricuspid valve repair)    S/P ventricular assist device    S/P endoscopy    OBJECTIVE:  ICU Vital Signs Last 24 Hrs  T(C): 38.2 (2021 10:00), Max: 38.5 (2021 12:00)  T(F): 100.8 (2021 10:00), Max: 101.3 (2021 12:00)  HR: 65 (2021 10:00) (61 - 69)  BP: --  BP(mean): --  ABP: 105/67 (2021 10:00) (90/54 - 113/64)  ABP(mean): 77 (2021 10:00) (63 - 77)  RR: 20 (2021 10:00) (19 - 35)  SpO2: 99% (2021 10:00) (96% - 100%)       @ 07: @ 07:00  --------------------------------------------------------  IN: 2693.9 mL / OUT: 1415 mL / NET: 1278.9 mL     @ 07: @ 10:49  --------------------------------------------------------  IN: 420.8 mL / OUT: 115 mL / NET: 305.8 mL  PHYSICAL EXAM:Daily   Elderly male S/P tracheostomy on trach. collar 50% FI02 ,  Daily Weight in k.4 (2021 00:00)  HEENT:     + NCAT  + EOMI  - Conjuctival edema   - Icterus   - Thrush   - ETT  + NGT/OGT  Neck:         + FROM  RT IJ  line JVD  - Nodes - Masses + Mid-line trachea + Tracheostomy  Chest:            normal A-P diameter    Lungs:          + CTA   + Rhonchi    - Rales    - Wheezing + Decreased  LT BS   - Dullness R L  Cardiac:       + S1 + S2    + RRR   - Irregular   - S3  - S4    - Murmurs   - Rub   - Hamman’s sign   Abdomen:    + BS  + Soft + Non-tender - Distended - Organomegaly - PEG .cholecystostomy tube in place  Extremities:   - Cyanosis U/L   - Clubbing  U/L  + LE/UE Edema   + Capillary refill    + Pulses   Neuro:        - Awake   -  Alert   - Confused   - Lethargic   + Sedated  + Generalized Weakness  Skin:        - Rashes    - Erythema   + Normal incisions   + IV sites intact          HOSPITAL MEDICATIONS: All medications reviewed and analyzed  MEDICATIONS  (STANDING):  amiodarone    Tablet 200 milliGRAM(s) Oral daily  chlorhexidine 0.12% Liquid 15 milliLiter(s) Oral Mucosa every 12 hours  chlorhexidine 2% Cloths 1 Application(s) Topical <User Schedule>  dexmedetomidine Infusion 0.5 MICROgram(s)/kG/Hr (9.81 mL/Hr) IV Continuous <Continuous>  dextrose 50% Injectable 50 milliLiter(s) IV Push every 15 minutes  heparin  Infusion 400 Unit(s)/Hr (12.5 mL/Hr) IV Continuous <Continuous>  Hydromorphone  Injectable 0.5 milliGRAM(s) IV Push once  insulin lispro (ADMELOG) corrective regimen sliding scale   SubCutaneous every 6 hours  pantoprazole  Injectable 40 milliGRAM(s) IV Push every 12 hours  piperacillin/tazobactam IVPB.. 3.375 Gram(s) IV Intermittent every 8 hours  propofol Infusion 20 MICROgram(s)/kG/Min (9.42 mL/Hr) IV Continuous <Continuous>  sodium chloride 0.9% lock flush 3 milliLiter(s) IV Push every 8 hours  sodium chloride 0.9%. 1000 milliLiter(s) (10 mL/Hr) IV Continuous <Continuous>    MEDICATIONS  (PRN):  acetaminophen    Suspension .. 650 milliGRAM(s) Oral every 6 hours PRN Temp greater or equal to 38C (100.4F)    LABS: All Lab data reviewed and analyzed                        8.7    21.21 )-----------( 323      ( 28 Aug 2021 01:07 )             27.8        136  |  98  |  50<H>  ----------------------------<  125<H>  5.1   |  27  |  0.64    Ca    10.9<H>      28 Aug 2021 01:07  Phos  3.2     -  Mg     2.4         TPro  9.2<H>  /  Alb  3.5  /  TBili  0.6  /  DBili  x   /  AST  79<H>  /  ALT  118<H>  /  AlkPhos  327<H>      Ca    11.1<H>      27 Aug 2021 01:26  Phos  5.5       Mg     2.5         TPro  9.2<H>  /  Alb  3.7  /  TBili  0.8  /  DBili  x   /  AST  58<H>  /  ALT  87<H>  /  AlkPhos  312<H>      x   |  x   |  x   ----------------------------<  x   5.1   |  x   |  x     Ca    10.7<H>      26 Aug 2021 01:00  Phos  3.6       Mg     2.2         TPro  9.7<H>  /  Alb  3.6  /  TBili  0.6  /  DBili  x   /  AST  53<H>  /  ALT  68<H>  /  AlkPhos  332<H>        Ca    11.0<H>      25 Aug 2021 00:59  Phos  4.5     -  Mg     2.0         TPro  9.6<H>  /  Alb  3.8  /  TBili  0.6  /  DBili  x   /  AST  48<H>  /  ALT  53<H>  /  AlkPhos  316<H>      Ca    10.1      24 Aug 2021 00:47  Phos  3.6     -  Mg     1.8         TPro  8.8<H>  /  Alb  3.5  /  TBili  0.4  /  DBili  x   /  AST  30  /  ALT  35  /  AlkPhos  250<H>      Ca    9.8      23 Aug 2021 00:36  Phos  3.2       Mg     1.8         TPro  8.4<H>  /  Alb  3.3  /  TBili  0.3  /  DBili  x   /  AST  26  /  ALT  35  /  AlkPhos  217<H>                                                                                                     PTT - ( 2021 04:52 )  PTT:45.2 sec LIVER FUNCTIONS - ( 2021 00:42 )  Alb: 3.4 g/dL / Pro: 6.7 g/dL / ALK PHOS: 213 U/L / ALT: 15 U/L / AST: 24 U/L / GGT: x           RADIOLOGY: - Reviewed and analyzed RT Pig tail cathter  , LVAD HM2, CT scan of abdomen reviewed result noted

## 2021-08-28 NOTE — PROGRESS NOTE ADULT - ASSESSMENT
Assessment and Recommendation:   · Assessment	  Assessment and recommendation :  Recurrent Acute hypoxic respiratory Failure S/P tracheostomy on trach. collar  50% FI02,   Acute blood loss anemia S/P multiple  blood transfusion   S/P septic shock off vasopressin , levophed and off  Dobutamine   S/P cholecystostomy tube placement by IR    AF RVR back to regular sinus Rhythm   Non ischemic cardiomyopathy continue ACE inhibitor and B-Blockers   S/P Septic shock and cardiogenic shock   Stage D systolic heart failure S/P LVAD HM2   MH2 LVAD  with  TV Annuloplasty  hyponatremia fluid restriction improved   increase Wbcs on cefepime   Severe peripheral vascular disease   no evidence of significant mesenteric thrombosis   severe hyperglycemia on insulin coverage    hyperthyroidism on Methimazole 10mg TID   critical care polyneuropathy   Anemia of Acute blood Loss   severe protein caloric malnutrition   S/P Small bowel bleeding   S/P blood and FFP transfusion   Chronic kidney disease stage III  resume  NGT feeding .tolerating it well   Decannulation ?  GI prophylaxis with PPI

## 2021-08-28 NOTE — PROGRESS NOTE ADULT - ASSESSMENT
65 year old male with history of Stage D HF due to NICM on LVAD,  TV annuloplasty ring 9/12/17 as destination therapy due to severe peripheral artery disease with significant stenosis  SIADH, Depression, CKD-3 with hyperkalemia consulted for management of hyperthyroidism, likely due to amiodarone induced type 2 thyrotoxicosis vs. hot nodules. He is Graves negative but positive for Hashimoto's disease. US could not be performed due to restriction from trach     Hyperthyroidism   -Check TFTs tomorrow  ( TSH, TT3 and FT4) along w/ LFTs  -Continue Methimazole to 15 mg daily, will continue to monitor LFTs and consider dose reduction if further increase in LFTs.  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 that is not resolving with methimazole solely   -Continue prednisone 40 mg daily for treatment of the Type 2 AIT   -Endocrine team will continue to monitor inpatient     DM   A1C 5.4% (08.15.21 @ 13:52)  -Likely will have worsening of DM with initiation of steroids, will escalate dosing as needed   -Currently on TF, difficulty w/ tolerating at times and slowly being increased to goal per discussion w/ RN  -Continue NPH 4 units q 6H with the tube feeds ( Please hold if not administering feeds)  -Continue low dose correctional scale   -Endocrine team with continue to monitor     Discussed w/ Primary team JESUS Carty.     Trisha Carty DO   Endocrinology Fellow   Pager [phone]  Please call [phone] after hours and on weekends/holidays.

## 2021-08-28 NOTE — PROGRESS NOTE ADULT - SUBJECTIVE AND OBJECTIVE BOX
RICKY HAMPTON  MRN-[id number]  Patient is a 66y old  Male who presents with a chief complaint of Anemia, Supratherapeutic INR, Dark Stools (26 Aug 2021 14:01)    HPI:  65M PMH ACC/AHA stage D HF due to NICM HM2 LVAD , TV annuloplasty ring 17 as destination therapy due to severe peripheral artery disease with significant stenosis  SIADH, Depression, CKD-3 with hyperkalemia, past E. coli UTIs, driveline drainage (21) and COVID-19 (back in 2020)  He was recently seen in clinic where he complained of abdominal pain and dark stools w constipation back in May. He presents to [facility] ER today weakness and fatigue, moderate and + Black stools for three days, on coumadin secondary to warfarin use in the setting of an LVAD. Patient has required transfusions for GIB in the past. Mostly recently back in 2021 pt had anemia with dark stools. No interventions was done at that time. However Last Endoscopy was done in 2020 (negative). Today labs show patient is anemic with H/H of 4.5/16.3,. INR is 8.84 MAP in the 90s, Temp 35.1. He denies any chest pain, shortness of breath, dizziness, abd pain, nausea or vomiting.       (2021 16:57)      Surgery/Hospital Course:   admit for melena w/ anemia, INR 8.84   6/15 Capsul study (+) for small bowel bleed, balloon endoscopy (old blood in prox ileum); post EGD - septic w/ L opacity, re-intubated for concern for aspiration, TTE (Mod MR, decrease biV w/ interventricular septum boweing towards R)   bronch    +C Diff    TC    CT C/A/P: Fluid filled colon which may be 2/2 rapid transit. Small bilateral pleffs with associates. Compressive atelectasis New ISABELLE & LLL  parenchymal opacities, suspicious for pneumonia. 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    #8 Shiley trach at bedside    CPAP trials    LVAD speed increased to 9200   Bronch; Central line dc'ed   TC since , continue as tolerated. Patient transferred to SDU.    Cont PT, no trach downsize today(#8cuffed)/ Anticoagulation/ Continue TF, speech f/u/ Vanco taper    oob to chair  INR  2.47  5 mg coumadin     increased lop to 25 q8  per HF   INR today 2.64.  H&H 7.3 this AM.  Will repeat CBC at noon, and will send stool guaiac Patient with persistent abdominal tenderness, rate of tube feeds decreased.  No nausea/vomiting.     INR today 2.4H&H 9.1.6 low flow overnight /N&V  NPO resolved for capsule study  Coumadin on hold refusing Tube feeds on D5 1/2 normal  @50 cc/hr   INR 2.69  H&H 7..1 refusing Tube feeds on D5 1/2 normal  @50 cc/hr. This am + BM Anusha Oneill HF  aware- PRBC x1  GI team consulted -  NPO  plan on study in am-  D/w Dr Cadet Patient  to return to CTU for further management; 1PRBCS    Post op INR 2.2 today.  No bleeding. BC + for SM.  Pt is hypotensive requiring pressor and inotropic support.  ID follow up today on Cefepime will follow.   R PTC for PTX    CT C/A/P: sub q emphysema in R chest wall, GGO RUL, small ascites CTH negative; Abd US: GB thickening, pericholecystic fluid     Perchole drain in place continues to drain total output overnight 133.  Fever today 38.8 given tylenol.  Will repeat BC now.     duplex LE negative    Patient with persistent abdominal pain, refusing tube feeds and medications, Psych consulted   CTA A/P ordered to r/o mesenteric ischemia 2/2 persistent anorexia, nausea, vomiting. Revealed:  Evaluation of the mesenteric vessels is limited by streak artifact from LVAD. There appears to be severe stenosis of the proximal SMA; abdominal mesenteric doppler is recommended for further evaluation. 2.  No small bowel findings to suggest acute mesenteric ischemia. 3.  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  8/15: Cultures resulted BC 1/2 +GPC in clusters, SC enterobacter; mesenteric duplex: borderline stenosis of proximal SMA     Today:   Patient ambulated, doing well today, tolerating tube feeds, CT C/A/P yesterday showed some pulmonary findings, sending fungitel, Procalcitonin normal.    REVIEW OF SYSTEMS:  Patient speaks over trach   Gen: No fever  EYES/ENT: No visual changes;  No vertigo or throat pain   NECK: No pain   RES:  No shortness of breath or Cough  CARD: No chest pain   GI: No abdominal pain  : No dysuria  NEURO: No weakness  SKIN: No itching, rashes     ICU Vital Signs Last 24 Hrs  T(C): 36.4 (27 Aug 2021 08:00), Max: 36.7 (26 Aug 2021 12:00)  T(F): 97.6 (27 Aug 2021 08:00), Max: 98 (26 Aug 2021 12:00)  HR: 111 (27 Aug 2021 10:10) (86 - 130)  BP: --  BP(mean): --  ABP: 105/79 (27 Aug 2021 10:10) (69/62 - 105/79)  ABP(mean): 91 (27 Aug 2021 10:10) (66 - 93)  RR: 38 (27 Aug 2021 10:10) (10 - 42)  SpO2: 100% (27 Aug 2021 10:10) (97% - 100%)      Physical Exam:  Gen:  Sitting in chair in NAD. Awake and alert, NAD  Psych: Withdrawn, depressed mood, slightly improved on meds.  CNS:  intact, nonfocal, responds to all commands  Neck: no JVD, +TC   RES : course breath sounds, no wheezing              CVS: +LVAD hum   Abd: Soft, minimally-moderately tender in RUQ by perc dawn site, NT everywhere else, positive BS throughout. Perc dawn drain site c/d/i draining bilious fluid.  Skin: No rash  Ext:  no edema    ============================I/O===========================   I&O's Detail    26 Aug 2021 07:01  -  27 Aug 2021 07:00  --------------------------------------------------------  IN:    Enteral Tube Flush: 100 mL    Esmolol: 37 mL    IV PiggyBack: 100 mL    Miscellaneous Tube Feedin mL  Total IN: 627 mL    OUT:    Drain (mL): 150 mL    Voided (mL): 1150 mL  Total OUT: 1300 mL    Total NET: -673 mL      27 Aug 2021 07:01  -  27 Aug 2021 11:00  --------------------------------------------------------  IN:    multiple electrolytes Injection Type 1.: 100 mL  Total IN: 100 mL    OUT:    Drain (mL): 25 mL    Emesis (mL): 1 mL    Esmolol: 0 mL    Voided (mL): 550 mL  Total OUT: 576 mL    Total NET: -476 mL        ============================ LABS =========================                        9.1    32.37 )-----------( 355      ( 27 Aug 2021 01:30 )             29.2         132<L>  |  94<L>  |  56<H>  ----------------------------<  145<H>  4.7   |  25  |  0.83    Ca    11.1<H>      27 Aug 2021 01:26  Phos  5.5       Mg     2.5         TPro  9.2<H>  /  Alb  3.7  /  TBili  0.8  /  DBili  x   /  AST  58<H>  /  ALT  87<H>  /  AlkPhos  312<H>      LIVER FUNCTIONS - ( 27 Aug 2021 01:26 )  Alb: 3.7 g/dL / Pro: 9.2 g/dL / ALK PHOS: 312 U/L / ALT: 87 U/L / AST: 58 U/L / GGT: x             ABG - ( 27 Aug 2021 00:43 )  pH, Arterial: 7.39  pH, Blood: x     /  pCO2: 50    /  pO2: 140   / HCO3: 30    / Base Excess: 4.6   /  SaO2: 99.9              Urinalysis Basic - ( 26 Aug 2021 17:32 )    Color: Dark Yellow / Appearance: Slightly Turbid / S.037 / pH: x  Gluc: x / Ketone: Trace  / Bili: Negative / Urobili: Negative   Blood: x / Protein: 100 / Nitrite: Negative   Leuk Esterase: Negative / RBC: 1 /hpf / WBC 1 /HPF   Sq Epi: x / Non Sq Epi: 2 / Bacteria: Negative      ======================Micro/Rad/Cardio=================  Culture: Reviewed   CXR: Reviewed  Echo:Reviewed  ======================================================  PAST MEDICAL & SURGICAL HISTORY:  CHF (congestive heart failure)    CAD (coronary artery disease)    Depression    Pleural effusion    History of 2019 novel coronavirus disease (COVID-19)  2020    Hemorrhoids    Bleeding hemorrhoids    Peripheral arterial disease    Claudication    BPH with urinary obstruction    ACC/AHA stage D systolic heart failure    Anticoagulation goal of INR 2.0 to 2.5    Falls    Clavicle fracture    CKD (chronic kidney disease), stage III    Iron deficiency anemia    H/O epistaxis    Vertigo    GI bleed    S/P TVR (tricuspid valve repair)    S/P ventricular assist device    S/P endoscopy      ====================ASSESSMENT ==============  Stage D Nonischemic Cardiomyopathy, Status Post HM2 on 2017    Cardiogenic shock  Hemodynamic instability   Acute hypoxemic respiratory failure  GI bleed , Status Post Enteroscopy   Anemia, in setting of melena   Chronic Kidney Disease  Stress hyperglycemia   C.diff positive on    Hypovolemic shock  Septic shock  Leukocytosis      Plan:  ====================== NEUROLOGY=====================  Nonfocal, continue to monitor neuro status   Tylenol PRN for analgesia   C/w mirtazapine and sertraline for depression  PT/Ambulate as tolerated    acetaminophen    Suspension .. 650 milliGRAM(s) Oral every 6 hours PRN Mild Pain (1 - 3)  mirtazapine 7.5 milliGRAM(s) Oral daily  sertraline 50 milliGRAM(s) Oral daily    ==================== RESPIRATORY======================  Acute hypoxemic respiratory failure s/p #8 shiley trach on , continue TC as tolerated (TC since )  Continue close monitoring of respiratory rate and breathing pattern, following of ABG’s with A-line monitoring, continuous pulse oximetry monitoring.   Worsening secretions, continue suctioning prn and inhaled tobramycin     ====================CARDIOVASCULAR==================  Stage D Nonischemic Cardiomyopathy, Status Post HM2 on 2017; LVAD settings 9200, flow 5.7  TTE : reveals at least moderate MR, mild AI, severe global LV syst dysfxn, dec RV fxn   Continue invasive hemodynamic monitoring   Propranolol for rate control of HR 2/2 Hyperthyroidism, titrate as tolerated  Remains off losartan    propranolol 30 milliGRAM(s) Oral every 8 hours    ===================HEMATOLOGIC/ONC ===================  Acute blood loss anemia, monitor H&H/Plts    Continue monitor LDH daily     VTE prophylaxis, heparin   Injectable 5000 Unit(s) SubCutaneous every 8 hours    ===================== RENAL =========================   Continue monitoring urine output, I&OS, BUN/Cr   Replete lytes PRN. Keep K> 4 and Mg >2     ==================== GASTROINTESTINAL===================  S/p cholecystostomy tube placed on : with IR with -60cc of black bile, will monitor site closely.   Recent emesis on  in setting of abdominal pain, as per GI likely component of illeus given critical illness   CTA A/P : Evaluation of the mesenteric vessels is limited by streak artifact from LVAD. There appears to be severe stenosis of the proximal SMA; abdominal mesenteric doppler is recommended for further evaluation. 2.  No small bowel findings to suggest acute mesenteric ischemia. 3.  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  Mesenteric duplex on 8/15 borderline stenosis of proximal SMA   Tolerating Vital 1.5 @ 30 ml /hr will titrate slowly now with post pyloric tube, 5 ml/day to goal feeds.  Advance for post pyloric feeding tube and continue to monitor closely for episodes of emesis  If emesis persists, will discuss ?intervention for SMA stenosis? with vascular. CT C/A/P ordered for further evaluation, will f/u.   Zofran PRN for nausea   Reglan for gut motility     multiple electrolytes Injection Type 1 1000 milliLiter(s) (50 mL/Hr) IV Continuous <Continuous>  multivitamin 1 Tablet(s) Oral daily  GI prophylaxis, pantoprazole  Injectable 40 milliGRAM(s) IV Push every 12 hours  thiamine 100 milliGRAM(s) Oral daily  metoclopramide Injectable 10 milliGRAM(s) IV Push every 8 hours  ondansetron Injectable 4 milliGRAM(s) IV Push every 6 hours PRN Nausea and/or Vomiting    =======================    ENDOCRINE  =====================  Stress hyperglycemia, continue glucose control with admelog sliding scale and NPH   Thyroid US to evaluate for thyroid nodule/goiter in setting of hyperthyroid  - contraindicated at this time 2/2 trach/will do when trach is out  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 C/w cefepime.  Vancomycin  PO for C.diff prophylaxis   Inhaled tobramycin for increased secretions     Pending repeat cultures sent yesterday     cefepime   IVPB 1000 milliGRAM(s) IV Intermittent every 8 hours  tobramycin for Nebulization 300 milliGRAM(s) Inhalation every 12 hours  vancomycin    Solution 125 milliGRAM(s) Oral every 6 hours      Patient requires continuous monitoring with bedside rhythm monitoring, pulse ox monitoring, and intermittent blood gas analysis. Care plan discussed with ICU care team. Patient remained critical and at risk for life threatening decompensation.     Electronically signed: CLAUDIA Alfaro

## 2021-08-28 NOTE — PROGRESS NOTE ADULT - SUBJECTIVE AND OBJECTIVE BOX
Endocrine Follow Up:   Interval History:   Patient seen and examined at bedside today AM.     MEDICATIONS  (STANDING):  cefepime   IVPB      cefepime   IVPB 1000 milliGRAM(s) IV Intermittent every 8 hours  chlorhexidine 2% Cloths 1 Application(s) Topical <User Schedule>  heparin   Injectable 5000 Unit(s) SubCutaneous every 8 hours  HYDROmorphone  Injectable 0.5 milliGRAM(s) IV Push once  insulin lispro (ADMELOG) corrective regimen sliding scale   SubCutaneous every 6 hours  insulin NPH human recombinant 4 Unit(s) SubCutaneous every 6 hours  methimazole 15 milliGRAM(s) Oral daily  metoclopramide Injectable 10 milliGRAM(s) IV Push every 8 hours  mirtazapine 7.5 milliGRAM(s) Oral daily  multiple electrolytes Injection Type 1 1000 milliLiter(s) (50 mL/Hr) IV Continuous <Continuous>  multivitamin 1 Tablet(s) Oral daily  pantoprazole  Injectable 40 milliGRAM(s) IV Push every 12 hours  predniSONE  Solution 40 milliGRAM(s) Oral daily  propranolol 30 milliGRAM(s) Oral every 8 hours  sertraline 50 milliGRAM(s) Oral daily  thiamine 100 milliGRAM(s) Oral daily  tobramycin for Nebulization 300 milliGRAM(s) Inhalation every 12 hours  vancomycin    Solution 125 milliGRAM(s) Oral every 6 hours    MEDICATIONS  (PRN):  acetaminophen    Suspension .. 650 milliGRAM(s) Oral every 6 hours PRN Mild Pain (1 - 3)  ondansetron Injectable 4 milliGRAM(s) IV Push every 6 hours PRN Nausea and/or Vomiting      Allergies  Amiodarone Hydrochloride (Other (Severe))  Intolerances    ROS: All other systems reviewed and negative    PHYSICAL EXAM:  VITALS: T(C): 36.2 (08-28-21 @ 08:00)  T(F): 97.2 (08-28-21 @ 08:00), Max: 97.2 (08-28-21 @ 08:00)  HR: 103 (08-28-21 @ 14:00) (86 - 129)  BP: --  RR:  (20 - 45)  SpO2:  (91% - 100%)  Wt(kg): --    GENERAL: NAD, well-groomed, well-developed  EYES: No proptosis,  anicteric  HEENT:  Atraumatic, Normocephalic, moist mucous membranes  THYROID: Normal size, no palpable nodules  RESPIRATORY: Clear to auscultation bilaterally; No rales, rhonchi, wheezing, or rubs  CARDIOVASCULAR: Regular rate and rhythm; No murmurs  GI: Soft, nontender, non distended, normal bowel sounds  SKIN: Dry, intact, No rashes or lesions  MUSCULOSKELETAL: Full range of motion, normal strength  NEURO: AOx3, moves all extremities spontaenuously   PSYCH:  reactive affect, euthymic mood    POCT Blood Glucose.: 187 mg/dL (08-28-21 @ 11:40)  POCT Blood Glucose.: 162 mg/dL (08-28-21 @ 06:08)  POCT Blood Glucose.: 181 mg/dL (08-27-21 @ 18:33)  POCT Blood Glucose.: 235 mg/dL (08-27-21 @ 13:11)  POCT Blood Glucose.: 120 mg/dL (08-27-21 @ 05:45)  POCT Blood Glucose.: 161 mg/dL (08-27-21 @ 00:37)  POCT Blood Glucose.: 172 mg/dL (08-26-21 @ 17:08)  POCT Blood Glucose.: 118 mg/dL (08-26-21 @ 11:40)  POCT Blood Glucose.: 165 mg/dL (08-26-21 @ 06:53)  POCT Blood Glucose.: 264 mg/dL (08-26-21 @ 00:24)  POCT Blood Glucose.: 205 mg/dL (08-25-21 @ 17:38)      08-28    136  |  98  |  50<H>  ----------------------------<  125<H>  5.1   |  27  |  0.64    EGFR if : 119  EGFR if non : 103    Ca    10.9<H>      08-28  Mg     2.4     08-28  Phos  3.2     08-28    TPro  9.2<H>  /  Alb  3.5  /  TBili  0.6  /  DBili  x   /  AST  79<H>  /  ALT  118<H>  /  AlkPhos  327<H>  08-28          Thyroid Function Tests:  08-27 @ 03:42 TSH <0.01 FreeT4 -- T3 276 Anti TPO -- Anti Thyroglobulin Ab -- TSI --  08-25 @ 03:32 TSH <0.01 FreeT4 -- T3 343 Anti TPO -- Anti Thyroglobulin Ab -- TSI --                       Endocrine Follow Up:   Interval History:   Patient seen and examined at bedside today AM. LFTs trending up slowly per discussion. Currently on tube feeds. Soft systolic pressures but MAPS in 70s per discussion w/ RN. TT3 elevated although still trending down.     MEDICATIONS  (STANDING):  cefepime   IVPB      cefepime   IVPB 1000 milliGRAM(s) IV Intermittent every 8 hours  chlorhexidine 2% Cloths 1 Application(s) Topical <User Schedule>  heparin   Injectable 5000 Unit(s) SubCutaneous every 8 hours  HYDROmorphone  Injectable 0.5 milliGRAM(s) IV Push once  insulin lispro (ADMELOG) corrective regimen sliding scale   SubCutaneous every 6 hours  insulin NPH human recombinant 4 Unit(s) SubCutaneous every 6 hours  methimazole 15 milliGRAM(s) Oral daily  metoclopramide Injectable 10 milliGRAM(s) IV Push every 8 hours  mirtazapine 7.5 milliGRAM(s) Oral daily  multiple electrolytes Injection Type 1 1000 milliLiter(s) (50 mL/Hr) IV Continuous <Continuous>  multivitamin 1 Tablet(s) Oral daily  pantoprazole  Injectable 40 milliGRAM(s) IV Push every 12 hours  predniSONE  Solution 40 milliGRAM(s) Oral daily  propranolol 30 milliGRAM(s) Oral every 8 hours  sertraline 50 milliGRAM(s) Oral daily  thiamine 100 milliGRAM(s) Oral daily  tobramycin for Nebulization 300 milliGRAM(s) Inhalation every 12 hours  vancomycin    Solution 125 milliGRAM(s) Oral every 6 hours    MEDICATIONS  (PRN):  acetaminophen    Suspension .. 650 milliGRAM(s) Oral every 6 hours PRN Mild Pain (1 - 3)  ondansetron Injectable 4 milliGRAM(s) IV Push every 6 hours PRN Nausea and/or Vomiting      Allergies  Amiodarone Hydrochloride (Other (Severe))  Intolerances    ROS: All other systems reviewed and negative    PHYSICAL EXAM:  VITALS: T(C): 36.2 (08-28-21 @ 08:00)  T(F): 97.2 (08-28-21 @ 08:00), Max: 97.2 (08-28-21 @ 08:00)  HR: 103 (08-28-21 @ 14:00) (86 - 129)  BP: --  RR:  (20 - 45)  SpO2:  (91% - 100%)  Wt(kg): --    GENERAL: NAD, well-groomed, well-developed  EYES: No proptosis,  anicteric  HEENT:  Atraumatic, Normocephalic, moist mucous membranes  THYROID: Normal size, no palpable nodules  RESPIRATORY: Clear to auscultation bilaterally; No rales, rhonchi, wheezing, or rubs  CARDIOVASCULAR: Regular rate and rhythm; No murmurs  GI: Soft, nontender, non distended, normal bowel sounds  SKIN: Dry, intact, No rashes or lesions  MUSCULOSKELETAL: Full range of motion, normal strength  NEURO: AOx3, moves all extremities spontaenuously   PSYCH:  reactive affect, euthymic mood    POCT Blood Glucose.: 187 mg/dL (08-28-21 @ 11:40)  POCT Blood Glucose.: 162 mg/dL (08-28-21 @ 06:08)  POCT Blood Glucose.: 181 mg/dL (08-27-21 @ 18:33)  POCT Blood Glucose.: 235 mg/dL (08-27-21 @ 13:11)  POCT Blood Glucose.: 120 mg/dL (08-27-21 @ 05:45)  POCT Blood Glucose.: 161 mg/dL (08-27-21 @ 00:37)  POCT Blood Glucose.: 172 mg/dL (08-26-21 @ 17:08)  POCT Blood Glucose.: 118 mg/dL (08-26-21 @ 11:40)  POCT Blood Glucose.: 165 mg/dL (08-26-21 @ 06:53)  POCT Blood Glucose.: 264 mg/dL (08-26-21 @ 00:24)  POCT Blood Glucose.: 205 mg/dL (08-25-21 @ 17:38)      08-28    136  |  98  |  50<H>  ----------------------------<  125<H>  5.1   |  27  |  0.64    EGFR if : 119  EGFR if non : 103    Ca    10.9<H>      08-28  Mg     2.4     08-28  Phos  3.2     08-28    TPro  9.2<H>  /  Alb  3.5  /  TBili  0.6  /  DBili  x   /  AST  79<H>  /  ALT  118<H>  /  AlkPhos  327<H>  08-28          Thyroid Function Tests:  08-27 @ 03:42 TSH <0.01 FreeT4 -- T3 276 Anti TPO -- Anti Thyroglobulin Ab -- TSI --  08-25 @ 03:32 TSH <0.01 FreeT4 -- T3 343 Anti TPO -- Anti Thyroglobulin Ab -- TSI --                       Endocrine Follow Up:   Interval History:   Patient seen and examined at bedside today AM. LFTs trending up slowly per discussion. Currently on tube feeds. Soft systolic pressures but MAPS in 70s per discussion w/ RN. TT3 elevated although still trending down.     MEDICATIONS  (STANDING):  cefepime   IVPB      cefepime   IVPB 1000 milliGRAM(s) IV Intermittent every 8 hours  heparin   Injectable 5000 Unit(s) SubCutaneous every 8 hours  insulin lispro (ADMELOG) corrective regimen sliding scale   SubCutaneous every 6 hours  insulin NPH human recombinant 6 Unit(s) SubCutaneous every 6 hours  methimazole 15 milliGRAM(s) Oral daily  metoclopramide Injectable 10 milliGRAM(s) IV Push every 8 hours  mirtazapine 7.5 milliGRAM(s) Oral daily  multiple electrolytes Injection Type 1 1000 milliLiter(s) (30 mL/Hr) IV Continuous <Continuous>  multivitamin 1 Tablet(s) Oral daily  pantoprazole  Injectable 40 milliGRAM(s) IV Push every 12 hours  predniSONE  Solution 40 milliGRAM(s) Oral daily  propranolol 30 milliGRAM(s) Oral every 8 hours  sertraline 50 milliGRAM(s) Oral daily  thiamine 100 milliGRAM(s) Oral daily  tobramycin for Nebulization 300 milliGRAM(s) Inhalation every 12 hours  vancomycin    Solution 125 milliGRAM(s) Oral every 6 hours    MEDICATIONS  (PRN):  acetaminophen    Suspension .. 650 milliGRAM(s) Oral every 6 hours PRN Mild Pain (1 - 3)  ondansetron Injectable 4 milliGRAM(s) IV Push every 6 hours PRN Nausea and/or Vomiting      Allergies  Amiodarone Hydrochloride (Other (Severe))  Intolerances    ROS: All other systems reviewed and negative    PHYSICAL EXAM:  VITALS: T(C): 36.2 (08-28-21 @ 08:00)  T(F): 97.2 (08-28-21 @ 08:00), Max: 97.2 (08-28-21 @ 08:00)  HR: 103 (08-28-21 @ 14:00) (86 - 129)  BP: --  RR:  (20 - 45)  SpO2:  (91% - 100%)  Wt(kg): --    GENERAL: NAD, thin, ill appearing male   EYES: No proptosis,  anicteric  HEENT:  Atraumatic, Normocephalic, +trach  RESPIRATORY: coarse breath sounds in b/l lung fields  CARDIOVASCULAR: Regular rate and rhythm; No murmurs  GI: Soft, +mild ttp, non distended, +biliary drain   NEURO: AOx3, moves all extremities spontaenuously   PSYCH:  reactive affect, euthymic mood    POCT Blood Glucose.: 187 mg/dL (08-28-21 @ 11:40)  POCT Blood Glucose.: 162 mg/dL (08-28-21 @ 06:08)  POCT Blood Glucose.: 181 mg/dL (08-27-21 @ 18:33)  POCT Blood Glucose.: 235 mg/dL (08-27-21 @ 13:11)  POCT Blood Glucose.: 120 mg/dL (08-27-21 @ 05:45)  POCT Blood Glucose.: 161 mg/dL (08-27-21 @ 00:37)  POCT Blood Glucose.: 172 mg/dL (08-26-21 @ 17:08)  POCT Blood Glucose.: 118 mg/dL (08-26-21 @ 11:40)  POCT Blood Glucose.: 165 mg/dL (08-26-21 @ 06:53)  POCT Blood Glucose.: 264 mg/dL (08-26-21 @ 00:24)  POCT Blood Glucose.: 205 mg/dL (08-25-21 @ 17:38)      08-28    136  |  98  |  50<H>  ----------------------------<  125<H>  5.1   |  27  |  0.64    EGFR if : 119  EGFR if non : 103    Ca    10.9<H>      08-28  Mg     2.4     08-28  Phos  3.2     08-28    TPro  9.2<H>  /  Alb  3.5  /  TBili  0.6  /  DBili  x   /  AST  79<H>  /  ALT  118<H>  /  AlkPhos  327<H>  08-28          Thyroid Function Tests:  08-27 @ 03:42 TSH <0.01 FreeT4 -- T3 276 Anti TPO -- Anti Thyroglobulin Ab -- TSI --  08-25 @ 03:32 TSH <0.01 FreeT4 -- T3 343 Anti TPO -- Anti Thyroglobulin Ab -- TSI --

## 2021-08-29 NOTE — PROGRESS NOTE ADULT - ASSESSMENT
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 coli UTIs, driveline drainage (1/7/21) and COVID-19, here initially for GIB prolonged hospital course, s/p tracheostomy, acalculous cholecystitis s/p perc dawn. Patient has persistent intermittent abdominal pain. Per CTU team, pt has recently been refusing tube feeds and is being evaluated for chronic mesenteric ischemia vs SMA syndrome.  8/13 bld cxs positive. Consulted for TPN. Prealb 11, prior a1c 5.6, tg 141. 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 Tolerating feeds but with intermittent abd pain. TF now held 2/2 vomiting    -Pt tolerating tube feeds as per CTU team, no present plan to initiate TPN, will reassess need pending clinical course.  -Nutritional Assessment, obtain prealbumin.  -Abdominal pain/vomiting- f/u axr; reglan per gi; planned for vascular f/u  -Anemia- rec iron supp when feasible  -Electrolyte Imbalance Risk. Obtain daily lytes and CMP, replete as needed per CTU, hyponatremia- consider urine lytes, trend labs  -Leukocytosis- on abx, f/u cxs, w/u per ID/primary  -Psych, palliative care, and ethics input noted  -Management per CTU; will remain available as needed, discussed above with bedside CTU ACP.    discussed with ANNE Tang, agreeable with above    TPN pager [phone], spectra [phone]  M-F 6A-4P, Weekends and holidays 8A-12P           66M PMH ACC/AHA stage D HF due to NICM HM2 LVAD , TV annuloplasty ring 9/12/17 as destination therapy due to severe peripheral artery disease with significant stenosis  SIADH, Depression, CKD-3 with hyperkalemia, past E. coli UTIs, driveline drainage (1/7/21) and COVID-19, here initially for GIB prolonged hospital course, s/p tracheostomy, acalculous cholecystitis s/p perc dawn. Patient has persistent intermittent abdominal pain. Per CTU team, pt has recently been refusing tube feeds and is being evaluated for chronic mesenteric ischemia vs SMA syndrome.  8/13 bld cxs positive. Consulted for TPN. Prealb 11, prior a1c 5.6, tg 141. 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 Tolerating feeds but with intermittent abd pain.     -Pt tolerating tube feeds Vital 1.5 @ 35 (goal of 65 ml /hr) as per CTU team, no present plan to initiate TPN, will reassess need pending clinical course.  -Nutritional Assessment, obtain prealbumin.  -h/o Abdominal pain/vomiting-Tolerating Vital 1.5 @ 30 ml /hr will titrate slowly now with post pyloric tube, 5 ml/day to goal feeds. Advance for post pyloric feeding tube and continue to monitor closely for episodes of emesis  If emesis persists, will discuss ?intervention for SMA stenosis? with vascular. Zofran PRN for nausea. Reglan for gut motility.   -Anemia- rec iron supp when feasible  -Electrolyte Imbalance Risk. Obtain daily lytes and CMP, replete as needed per CTU, hyponatremia- consider urine lytes, trend labs  -Leukocytosis- on abx, f/u cxs, w/u per ID/primary  -Psych, palliative care, and ethics input noted  -Management per CTU; will remain available as needed, discussed above with bedside CTU ACP, Bertrand Turk    discussed with ANNE Tang, agreeable with above    TPN pager [phone], spectra [phone]  M-F 6A-4P, Weekends and holidays 8A-12P           66M PMH ACC/AHA stage D HF due to NICM HM2 LVAD , TV annuloplasty ring 9/12/17 as destination therapy due to severe peripheral artery disease with significant stenosis  SIADH, Depression, CKD-3 with hyperkalemia, past E. coli UTIs, driveline drainage (1/7/21) and COVID-19, here initially for GIB prolonged hospital course, s/p tracheostomy, acalculous cholecystitis s/p perc dawn. Patient has persistent intermittent abdominal pain. Per CTU team, pt has recently been refusing tube feeds and is being evaluated for chronic mesenteric ischemia vs SMA syndrome.  8/13 bld cxs positive. Consulted for TPN. Prealb 11, prior a1c 5.6, tg 141. 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 Tolerating feeds but with intermittent abd pain.     -Pt tolerating tube feeds Vital 1.5 @ 35 (goal of 65 ml /hr) as per CTU team, no present plan to initiate TPN, will reassess need pending clinical course.  -Nutritional Assessment, obtain prealbumin.  -h/o Abdominal pain/vomiting-Tolerating Vital 1.5 @ 30 ml /hr will titrate slowly now with post pyloric tube, 5 ml/day to goal feeds. Advance for post pyloric feeding tube and continue to monitor closely for episodes of emesis  If emesis persists, will discuss ?intervention for SMA stenosis? with vascular. Zofran PRN for nausea. Reglan for gut motility.   -Anemia- rec iron supp when feasible  -Electrolyte Imbalance Risk. Obtain daily lytes and CMP, replete as needed per CTU, hyponatremia- consider urine lytes, trend labs  -hyperthyroidism, HR still in 130s recommend increasing Propanalol to 60mg q 6hrs, which would also improve hypercalcemia, once hyperthyroidism is also better controlled, and alk phos would also possibly improve. Please obtain gamma GT, PTH and vit D level, spoke with CTU Bertrand LEE  -Leukocytosis- on abx, f/u cxs, w/u per ID/primary  -Psych, palliative care, and ethics input noted  -Management per CTU; will remain available as needed, discussed above with bedside CTU Bertrand LEE    discussed with ANNE Tang and Dr. Soliz, agreeable with above    TPN pager [phone], spectra [phone]  M-F 6A-4P, Weekends and holidays 8A-12P

## 2021-08-29 NOTE — PROGRESS NOTE ADULT - SUBJECTIVE AND OBJECTIVE BOX
RICKY HAMPTON  MRN-[id number]  Patient is a 66y old  Male who presents with a chief complaint of Anemia, Supratherapeutic INR, Dark Stools (26 Aug 2021 14:01)    HPI:  65M PMH ACC/AHA stage D HF due to NICM HM2 LVAD , TV annuloplasty ring 17 as destination therapy due to severe peripheral artery disease with significant stenosis  SIADH, Depression, CKD-3 with hyperkalemia, past E. coli UTIs, driveline drainage (21) and COVID-19 (back in 2020)  He was recently seen in clinic where he complained of abdominal pain and dark stools w constipation back in May. He presents to [facility] ER today weakness and fatigue, moderate and + Black stools for three days, on coumadin secondary to warfarin use in the setting of an LVAD. Patient has required transfusions for GIB in the past. Mostly recently back in 2021 pt had anemia with dark stools. No interventions was done at that time. However Last Endoscopy was done in 2020 (negative). Today labs show patient is anemic with H/H of 4.5/16.3,. INR is 8.84 MAP in the 90s, Temp 35.1. He denies any chest pain, shortness of breath, dizziness, abd pain, nausea or vomiting.       (2021 16:57)      Surgery/Hospital Course:   admit for melena w/ anemia, INR 8.84   6/15 Capsul study (+) for small bowel bleed, balloon endoscopy (old blood in prox ileum); post EGD - septic w/ L opacity, re-intubated for concern for aspiration, TTE (Mod MR, decrease biV w/ interventricular septum boweing towards R)   bronch    +C Diff    TC    CT C/A/P: Fluid filled colon which may be 2/2 rapid transit. Small bilateral pleffs with associates. Compressive atelectasis New ISABELLE & LLL  parenchymal opacities, suspicious for pneumonia. 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    #8 Shiley trach at bedside    CPAP trials    LVAD speed increased to 9200   Bronch; Central line dc'ed   TC since , continue as tolerated. Patient transferred to SDU.    Cont PT, no trach downsize today(#8cuffed)/ Anticoagulation/ Continue TF, speech f/u/ Vanco taper    oob to chair  INR  2.47  5 mg coumadin     increased lop to 25 q8  per HF   INR today 2.64.  H&H 7.3 this AM.  Will repeat CBC at noon, and will send stool guaiac Patient with persistent abdominal tenderness, rate of tube feeds decreased.  No nausea/vomiting.     INR today 2.4H&H 9.1.6 low flow overnight /N&V  NPO resolved for capsule study  Coumadin on hold refusing Tube feeds on D5 1/2 normal  @50 cc/hr   INR 2.69  H&H 7..1 refusing Tube feeds on D5 1/2 normal  @50 cc/hr. This am + BM Anusha Oneill HF  aware- PRBC x1  GI team consulted -  NPO  plan on study in am-  D/w Dr Cadet Patient  to return to CTU for further management; 1PRBCS    Post op INR 2.2 today.  No bleeding. BC + for SM.  Pt is hypotensive requiring pressor and inotropic support.  ID follow up today on Cefepime will follow.   R PTC for PTX    CT C/A/P: sub q emphysema in R chest wall, GGO RUL, small ascites CTH negative; Abd US: GB thickening, pericholecystic fluid     Perchole drain in place continues to drain total output overnight 133.  Fever today 38.8 given tylenol.  Will repeat BC now.     duplex LE negative    Patient with persistent abdominal pain, refusing tube feeds and medications, Psych consulted   CTA A/P ordered to r/o mesenteric ischemia 2/2 persistent anorexia, nausea, vomiting. Revealed:  Evaluation of the mesenteric vessels is limited by streak artifact from LVAD. There appears to be severe stenosis of the proximal SMA; abdominal mesenteric doppler is recommended for further evaluation. 2.  No small bowel findings to suggest acute mesenteric ischemia. 3.  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  8/15: Cultures resulted BC 1/2 +GPC in clusters, SC enterobacter; mesenteric duplex: borderline stenosis of proximal SMA     Today:   NGT reinserted this morning after becoming dislodged overnight, no episodes of vomiting.     REVIEW OF SYSTEMS:  Patient speaks over trach   Gen: No fever  EYES/ENT: No visual changes;  No vertigo or throat pain   NECK: No pain   RES:  No shortness of breath or Cough  CARD: No chest pain   GI: No abdominal pain  : No dysuria  NEURO: No weakness  SKIN: No itching, rashes     ICU Vital Signs Last 24 Hrs  T(C): 36.2 (29 Aug 2021 08:00), Max: 36.3 (28 Aug 2021 20:00)  T(F): 97.2 (29 Aug 2021 08:00), Max: 97.4 (29 Aug 2021 04:00)  HR: 126 (29 Aug 2021 09:00) (78 - 135)  BP: --  BP(mean): --  ABP: 77/63 (29 Aug 2021 09:00) (70/62 - 113/83)  ABP(mean): 71 (29 Aug 2021 09:00) (66 - 97)  RR: 34 (29 Aug 2021 09:00) (18 - 45)  SpO2: 100% (29 Aug 2021 09:00) (95% - 100%)    I&O's Detail    28 Aug 2021 07:01  -  29 Aug 2021 07:00  --------------------------------------------------------  IN:    Enteral Tube Flush: 100 mL    IV PiggyBack: 100 mL    Miscellaneous Tube Feedin mL    multiple electrolytes Injection Type 1.: 720 mL  Total IN: 1370 mL    OUT:    Albumin 5% - 50 mL: 0 mL    Drain (mL): 125 mL    Voided (mL): 450 mL  Total OUT: 575 mL    Total NET: 795 mL      29 Aug 2021 07:01  -  29 Aug 2021 09:34  --------------------------------------------------------  IN:    Enteral Tube Flush: 60 mL    Miscellaneous Tube Feedin mL    multiple electrolytes Injection Type 1.: 60 mL  Total IN: 155 mL    OUT:  Total OUT: 0 mL    Total NET: 155 mL            Physical Exam:  Gen:  Sitting in chair in NAD. Awake and alert, NAD  Psych: Mood greatly improved, more interactive and participating in care.  CNS:  intact, nonfocal, responds to all commands  Neck: no JVD, +TC   RES : course breath sounds, no wheezing              CVS: +LVAD hum   Abd: Soft, minimally-moderately tender in RUQ by perc dawn site, NT everywhere else, positive BS throughout. Perc dawn drain site c/d/i draining bilious fluid.  Skin: No rash  Ext:  no edema    LABS:                        8.4    16.07 )-----------( 302      ( 29 Aug 2021 00:36 )             27.6         138  |  100  |  34<H>  ----------------------------<  153<H>  4.6   |  27  |  0.50    Ca    10.9<H>      29 Aug 2021 00:36  Phos  2.6       Mg     2.0         TPro  8.9<H>  /  Alb  3.4  /  TBili  0.6  /  DBili  x   /  AST  57<H>  /  ALT  118<H>  /  AlkPhos  330<H>          CAPILLARY BLOOD GLUCOSE      POCT Blood Glucose.: 118 mg/dL (29 Aug 2021 06:10)      Culture - Body Fluid with Gram Stain (collected 21 @ 14:30)  Source: .Body Fluid Cholecystostomy drain  Gram Stain (21 @ 21:28):    No polymorphonuclear leukocytes per low power field    No organisms seen per oil power field  Preliminary Report (21 @ 13:20):    No growth    Culture - Blood (collected 21 @ 03:23)  Source: .Blood Blood-Peripheral  Preliminary Report (21 @ 04:01):    No growth to date.    Culture - Blood (collected 21 @ 22:58)  Source: .Blood Blood  Preliminary Report (21 @ 23:01):    No growth to date.    Culture - Blood (collected 21 @ 22:56)  Source: .Blood Blood  Preliminary Report (21 @ 23:01):    No growth to date.        RADIOLOGY & ADDITIONAL TESTS: Reviewed.    ======================Micro/Rad/Cardio=================  Culture: Reviewed   CXR: Reviewed  Echo:Reviewed  ======================================================  PAST MEDICAL & SURGICAL HISTORY:  CHF (congestive heart failure)    CAD (coronary artery disease)    Depression    Pleural effusion    History of  novel coronavirus disease (COVID-19)  2020    Hemorrhoids    Bleeding hemorrhoids    Peripheral arterial disease    Claudication    BPH with urinary obstruction    ACC/AHA stage D systolic heart failure    Anticoagulation goal of INR 2.0 to 2.5    Falls    Clavicle fracture    CKD (chronic kidney disease), stage III    Iron deficiency anemia    H/O epistaxis    Vertigo    GI bleed    S/P TVR (tricuspid valve repair)    S/P ventricular assist device    S/P endoscopy      ====================ASSESSMENT ==============  Stage D Nonischemic Cardiomyopathy, Status Post HM2 on 2017    Cardiogenic shock  Hemodynamic instability   Acute hypoxemic respiratory failure  GI bleed , Status Post Enteroscopy   Anemia, in setting of melena   Chronic Kidney Disease  Stress hyperglycemia   C.diff positive on    Hypovolemic shock  Septic shock  Leukocytosis      Plan:  ====================== NEUROLOGY=====================  Nonfocal, continue to monitor neuro status   Tylenol PRN for analgesia   C/w mirtazapine and sertraline for depression  PT/Ambulate as tolerated    acetaminophen    Suspension .. 650 milliGRAM(s) Oral every 6 hours PRN Mild Pain (1 - 3)  mirtazapine 7.5 milliGRAM(s) Oral daily  sertraline 50 milliGRAM(s) Oral daily    ==================== RESPIRATORY======================  Acute hypoxemic respiratory failure s/p #8 shiley trach on , continue TC as tolerated (TC since )  Continue close monitoring of respiratory rate and breathing pattern, following of ABG’s with A-line monitoring, continuous pulse oximetry monitoring.   Worsening secretions, continue suctioning prn and inhaled tobramycin   Will follow up with ID regarding possibly d/c'ing abx. No evidence of PNA on CXR    ====================CARDIOVASCULAR==================  Stage D Nonischemic Cardiomyopathy, Status Post HM2 on 2017; LVAD settings 9200, flow 5.7  TTE : reveals at least moderate MR, mild AI, severe global LV syst dysfxn, dec RV fxn   Continue invasive hemodynamic monitoring   Propranolol for rate control of HR 2/2 Hyperthyroidism, titrate as tolerated  Remains off losartan    propranolol 30 milliGRAM(s) Oral every 8 hours    ===================HEMATOLOGIC/ONC ===================  Acute blood loss anemia, monitor H&H/Plts    Continue monitor LDH daily     VTE prophylaxis, heparin   Injectable 5000 Unit(s) SubCutaneous every 8 hours    ===================== RENAL =========================   Continue monitoring urine output, I&OS, BUN/Cr   Replete lytes PRN. Keep K> 4 and Mg >2     ==================== GASTROINTESTINAL===================  S/p cholecystostomy tube placed on : with IR with -60cc of black bile, will monitor site closely.   Recent emesis on  in setting of abdominal pain, as per GI likely component of illeus given critical illness   CTA A/P : Evaluation of the mesenteric vessels is limited by streak artifact from LVAD. There appears to be severe stenosis of the proximal SMA; abdominal mesenteric doppler is recommended for further evaluation. 2.  No small bowel findings to suggest acute mesenteric ischemia. 3.  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  Mesenteric duplex on 8/15 borderline stenosis of proximal SMA   Tolerating Vital 1.5 @ 30 ml /hr will titrate slowly now with post pyloric tube, 5 ml/day to goal feeds.  Advance for post pyloric feeding tube and continue to monitor closely for episodes of emesis  If emesis persists, will discuss ?intervention for SMA stenosis? with vascular. CT C/A/P ordered for further evaluation, will f/u.   Zofran PRN for nausea   Reglan for gut motility     multiple electrolytes Injection Type 1 1000 milliLiter(s) (50 mL/Hr) IV Continuous <Continuous>  multivitamin 1 Tablet(s) Oral daily  GI prophylaxis, pantoprazole  Injectable 40 milliGRAM(s) IV Push every 12 hours  thiamine 100 milliGRAM(s) Oral daily  metoclopramide Injectable 10 milliGRAM(s) IV Push every 8 hours  ondansetron Injectable 4 milliGRAM(s) IV Push every 6 hours PRN Nausea and/or Vomiting    =======================    ENDOCRINE  =====================  Stress hyperglycemia, continue glucose control with admelog sliding scale and NPH   Thyroid US to evaluate for thyroid nodule/goiter in setting of hyperthyroid  - contraindicated at this time 2/2 trach/will do when trach is out  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 as per house endocrinology   Hyperthyroidism, c/w Methimazole will d/w endo re: decreasing dose for elevated LFTs  T3 improved today, c/w prednisone    insulin lispro (ADMELOG) corrective regimen sliding scale   SubCutaneous every 6 hours  insulin NPH human recombinant 4 Unit(s) SubCutaneous every 6 hours  methimazole 15 milliGRAM(s) Oral daily  predniSONE  Solution 40 milliGRAM(s) Oral daily    ========================INFECTIOUS DISEASE================  Afebrile, WBC Improved today, down to 16k  Continue trending WBC and monitoring fever curve   BC 1/2 +GPC in clusters, SCx+ enterobacter(CRE). C/w cefepime.  Vancomycin  PO for C.diff prophylaxis   Inhaled tobramycin for increased secretions     Pending repeat cultures sent yesterday     cefepime   IVPB 1000 milliGRAM(s) IV Intermittent every 8 hours  tobramycin for Nebulization 300 milliGRAM(s) Inhalation every 12 hours  vancomycin    Solution 125 milliGRAM(s) Oral every 6 hours      Patient requires continuous monitoring with bedside rhythm monitoring, pulse ox monitoring, and intermittent blood gas analysis. Care plan discussed with ICU care team. Patient remained critical and at risk for life threatening decompensation.     Electronically signed: CLAUDIA Alfaro

## 2021-08-29 NOTE — PROGRESS NOTE ADULT - PROBLEM SELECTOR PROBLEM 5
[Normal] : normal rate, regular rhythm, normal S1 and S2 and no murmur heard [Normal Affect] : the affect was normal [Alert and Oriented x3] : oriented to person, place, and time [Normal Insight/Judgement] : insight and judgment were intact Anemia

## 2021-08-29 NOTE — PROGRESS NOTE ADULT - ASSESSMENT
Assessment and Recommendation:   · Assessment	  Assessment and recommendation :  Recurrent Acute hypoxic respiratory Failure S/P tracheostomy on trach. collar  50% FI02,   Acute blood loss anemia S/P multiple  blood transfusion   S/P septic shock off vasopressin , levophed and off  Dobutamine   S/P cholecystostomy tube placement by IR    AF RVR back to regular sinus Rhythm   Non ischemic cardiomyopathy continue ACE inhibitor and B-Blockers   S/P Septic shock and cardiogenic shock   Stage D systolic heart failure S/P LVAD HM2   MH2 LVAD  with  TV Annuloplasty  hyponatremia fluid restriction improved   increase Wbcs on cefepime improving   Severe peripheral vascular disease   no evidence of significant mesenteric thrombosis   severe hyperglycemia on insulin coverage    hyperthyroidism on Methimazole 10mg TID   critical care polyneuropathy   Anemia of Acute blood Loss   severe protein caloric malnutrition   S/P Small bowel bleeding   S/P blood and FFP transfusion   Chronic kidney disease stage III  resume  NGT feeding .tolerating it well   Decannulation ?  GI prophylaxis with PPI

## 2021-08-29 NOTE — PROGRESS NOTE ADULT - SUBJECTIVE AND OBJECTIVE BOX
[facility] NUTRITION SUPPORT--  Attending/ PA FOLLOW UP NOTE      24 hour events/subjective: TPN originally consulted for nutrition support and started TPN on ____.  Pt is tolerating wihtou issues, and denies palpitations, chest pain, shortness of breath. Pt further denies fevers, chills nor night sweats. Denies nausea nor vomiting nor abdominal pain.        PAST HISTORY  --------------------------------------------------------------------------------  No significant changes to PMH, PSH, FHx, SHx, unless otherwise noted    ALLERGIES & MEDICATIONS  --------------------------------------------------------------------------------  Allergies    Amiodarone Hydrochloride (Other (Severe))    Intolerances      Standing Inpatient Medications  cefepime   IVPB      cefepime   IVPB 1000 milliGRAM(s) IV Intermittent every 8 hours  heparin   Injectable 5000 Unit(s) SubCutaneous every 8 hours  insulin lispro (ADMELOG) corrective regimen sliding scale   SubCutaneous every 6 hours  insulin NPH human recombinant 6 Unit(s) SubCutaneous every 6 hours  methimazole 15 milliGRAM(s) Oral daily  metoclopramide Injectable 10 milliGRAM(s) IV Push every 8 hours  mirtazapine 7.5 milliGRAM(s) Oral daily  multiple electrolytes Injection Type 1 1000 milliLiter(s) IV Continuous <Continuous>  multivitamin 1 Tablet(s) Oral daily  pantoprazole  Injectable 40 milliGRAM(s) IV Push every 12 hours  predniSONE  Solution 40 milliGRAM(s) Oral daily  propranolol 30 milliGRAM(s) Oral every 8 hours  sertraline 50 milliGRAM(s) Oral daily  thiamine 100 milliGRAM(s) Oral daily  tobramycin for Nebulization 300 milliGRAM(s) Inhalation every 12 hours  vancomycin    Solution 125 milliGRAM(s) Oral every 6 hours    PRN Inpatient Medications  acetaminophen    Suspension .. 650 milliGRAM(s) Oral every 6 hours PRN  ondansetron Injectable 4 milliGRAM(s) IV Push every 6 hours PRN      REVIEW OF SYSTEMS  --------------------------------------------------------------------------------  Gen: as per HPI  Skin: No rashes  Head/Eyes/Ears/Mouth: No headache;No sore throat  Respiratory: No dyspnea, cough,   CV: No chest pain, PND, orthopnea  GI: as per HPI  : No increased frequency, dysuria, hematuria, nocturia  MSK: No joint pain/swelling; no back pain; no edema  Neuro: No dizziness/lightheadedness, weakness, seizures, numbness, tingling  Psych: No significant nervousness, anxiety, stress, depression    All other systems were reviewed and are negative, except as noted.      LABS/STUDIES  --------------------------------------------------------------------------------              8.4    16.07 >-----------<  302      [08-29-21 @ 00:36]              27.6     138  |  100  |  34  ----------------------------<  153      [08-29-21 @ 00:36]  4.6   |  27  |  0.50        Ca     10.9     [08-29-21 @ 00:36]      Mg     2.0     [08-29-21 @ 00:36]      Phos  2.6     [08-29-21 @ 00:36]    TPro  8.9  /  Alb  3.4  /  TBili  0.6  /  DBili  x   /  AST  57  /  ALT  118  /  AlkPhos  330  [08-29-21 @ 00:36]              [08-29-21 @ 00:36]    Blood Gas Arterial - Calcium, Ionized: 1.43 mmol/L (08-29-21 @ 00:31)  Blood Gas Arterial - Calcium, Ionized: 1.43 mmol/L (08-28-21 @ 01:02)    Glucose, Serum: 153 mg/dL (08-29-21 @ 00:36)    Prealbumin, Serum: 11 mg/dL (08-16-21 @ 04:01)  Prealbumin, Serum: 10 mg/dL (08-15-21 @ 13:53)          08-28-21 @ 07:01  -  08-29-21 @ 07:00  --------------------------------------------------------  IN: 1370 mL / OUT: 575 mL / NET: 795 mL    08-29-21 @ 07:01  -  08-29-21 @ 09:41  --------------------------------------------------------  IN: 155 mL / OUT: 0 mL / NET: 155 mL        VITALS/PHYSICAL EXAM  --------------------------------------------------------------------------------  T(C): 36.2 (08-29-21 @ 08:00), Max: 36.3 (08-28-21 @ 20:00)  HR: 126 (08-29-21 @ 09:00) (78 - 135)  BP: --  RR: 34 (08-29-21 @ 09:00) (18 - 45)  SpO2: 100% (08-29-21 @ 09:00) (95% - 100%)  Wt(kg): --    Physical Exam:    Gen: lying in bed  HEENT: +trach +ngt   Neck: trachea midline, no noted JVD  Chest: respirations non labored  AbdL ND NT soft  Neuro: awake alert responsive    .  .  	Skin: Warm, without rashes  .  .  .   [facility] NUTRITION SUPPORT--  Attending/ PA FOLLOW UP NOTE      24 hour events/subjective: TPN originally consulted for nutrition support on 8/15.  Pt however is tolerating TFs, and is not requiring TPN.        PAST HISTORY  --------------------------------------------------------------------------------  No significant changes to PMH, PSH, FHx, SHx, unless otherwise noted    ALLERGIES & MEDICATIONS  --------------------------------------------------------------------------------  Allergies    Amiodarone Hydrochloride (Other (Severe))    Intolerances      Standing Inpatient Medications  cefepime   IVPB      cefepime   IVPB 1000 milliGRAM(s) IV Intermittent every 8 hours  heparin   Injectable 5000 Unit(s) SubCutaneous every 8 hours  insulin lispro (ADMELOG) corrective regimen sliding scale   SubCutaneous every 6 hours  insulin NPH human recombinant 6 Unit(s) SubCutaneous every 6 hours  methimazole 15 milliGRAM(s) Oral daily  metoclopramide Injectable 10 milliGRAM(s) IV Push every 8 hours  mirtazapine 7.5 milliGRAM(s) Oral daily  multiple electrolytes Injection Type 1 1000 milliLiter(s) IV Continuous <Continuous>  multivitamin 1 Tablet(s) Oral daily  pantoprazole  Injectable 40 milliGRAM(s) IV Push every 12 hours  predniSONE  Solution 40 milliGRAM(s) Oral daily  propranolol 30 milliGRAM(s) Oral every 8 hours  sertraline 50 milliGRAM(s) Oral daily  thiamine 100 milliGRAM(s) Oral daily  tobramycin for Nebulization 300 milliGRAM(s) Inhalation every 12 hours  vancomycin    Solution 125 milliGRAM(s) Oral every 6 hours    PRN Inpatient Medications  acetaminophen    Suspension .. 650 milliGRAM(s) Oral every 6 hours PRN  ondansetron Injectable 4 milliGRAM(s) IV Push every 6 hours PRN      REVIEW OF SYSTEMS  --------------------------------------------------------------------------------  Gen: as per HPI  Skin: No rashes  Head/Eyes/Ears/Mouth: No headache;No sore throat  Respiratory: No dyspnea, cough,   CV: No chest pain, PND, orthopnea  GI: as per HPI  : No increased frequency, dysuria, hematuria, nocturia  MSK: No joint pain/swelling; no back pain; no edema  Neuro: No dizziness/lightheadedness, weakness, seizures, numbness, tingling  Psych: No significant nervousness, anxiety, stress, depression    All other systems were reviewed and are negative, except as noted.      LABS/STUDIES  --------------------------------------------------------------------------------              8.4    16.07 >-----------<  302      [08-29-21 @ 00:36]              27.6     138  |  100  |  34  ----------------------------<  153      [08-29-21 @ 00:36]  4.6   |  27  |  0.50        Ca     10.9     [08-29-21 @ 00:36]      Mg     2.0     [08-29-21 @ 00:36]      Phos  2.6     [08-29-21 @ 00:36]    TPro  8.9  /  Alb  3.4  /  TBili  0.6  /  DBili  x   /  AST  57  /  ALT  118  /  AlkPhos  330  [08-29-21 @ 00:36]              [08-29-21 @ 00:36]    Blood Gas Arterial - Calcium, Ionized: 1.43 mmol/L (08-29-21 @ 00:31)  Blood Gas Arterial - Calcium, Ionized: 1.43 mmol/L (08-28-21 @ 01:02)    Glucose, Serum: 153 mg/dL (08-29-21 @ 00:36)    Prealbumin, Serum: 11 mg/dL (08-16-21 @ 04:01)  Prealbumin, Serum: 10 mg/dL (08-15-21 @ 13:53)          08-28-21 @ 07:01  -  08-29-21 @ 07:00  --------------------------------------------------------  IN: 1370 mL / OUT: 575 mL / NET: 795 mL    08-29-21 @ 07:01  -  08-29-21 @ 09:41  --------------------------------------------------------  IN: 155 mL / OUT: 0 mL / NET: 155 mL        VITALS/PHYSICAL EXAM  --------------------------------------------------------------------------------  T(C): 36.2 (08-29-21 @ 08:00), Max: 36.3 (08-28-21 @ 20:00)  HR: 126 (08-29-21 @ 09:00) (78 - 135)  BP: --  RR: 34 (08-29-21 @ 09:00) (18 - 45)  SpO2: 100% (08-29-21 @ 09:00) (95% - 100%)  Wt(kg): --    Physical Exam:    Gen: lying in bed  HEENT: +trach +ngt   Neck: trachea midline, no noted JVD  Chest: respirations non labored  AbdL ND NT soft  Neuro: awake alert responsive    .  .  	Skin: Warm, without rashes  .  .  .   [facility] NUTRITION SUPPORT--  Attending/ PA FOLLOW UP NOTE      24 hour events/subjective: TPN originally consulted for nutrition support on 8/15.  Pt however is tolerating TFs, and is not requiring TPN for any additional nutritional support at this time.    Of note, as per bedside ACP Bertrand, pt had NGT reinserted this morning after becoming dislodged overnight, no episodes of vomiting, and tube feeds were restarted without issues.      PAST HISTORY  --------------------------------------------------------------------------------  No significant changes to PMH, PSH, FHx, SHx, unless otherwise noted    ALLERGIES & MEDICATIONS  --------------------------------------------------------------------------------  Allergies    Amiodarone Hydrochloride (Other (Severe))    Intolerances      Standing Inpatient Medications  cefepime   IVPB      cefepime   IVPB 1000 milliGRAM(s) IV Intermittent every 8 hours  heparin   Injectable 5000 Unit(s) SubCutaneous every 8 hours  insulin lispro (ADMELOG) corrective regimen sliding scale   SubCutaneous every 6 hours  insulin NPH human recombinant 6 Unit(s) SubCutaneous every 6 hours  methimazole 15 milliGRAM(s) Oral daily  metoclopramide Injectable 10 milliGRAM(s) IV Push every 8 hours  mirtazapine 7.5 milliGRAM(s) Oral daily  multiple electrolytes Injection Type 1 1000 milliLiter(s) IV Continuous <Continuous>  multivitamin 1 Tablet(s) Oral daily  pantoprazole  Injectable 40 milliGRAM(s) IV Push every 12 hours  predniSONE  Solution 40 milliGRAM(s) Oral daily  propranolol 30 milliGRAM(s) Oral every 8 hours  sertraline 50 milliGRAM(s) Oral daily  thiamine 100 milliGRAM(s) Oral daily  tobramycin for Nebulization 300 milliGRAM(s) Inhalation every 12 hours  vancomycin    Solution 125 milliGRAM(s) Oral every 6 hours    PRN Inpatient Medications  acetaminophen    Suspension .. 650 milliGRAM(s) Oral every 6 hours PRN  ondansetron Injectable 4 milliGRAM(s) IV Push every 6 hours PRN      REVIEW OF SYSTEMS  --------------------------------------------------------------------------------  Gen: as per HPI  Skin: No rashes  Head/Eyes/Ears/Mouth: No headache;No sore throat  Respiratory: No dyspnea, cough,   CV: No chest pain, PND, orthopnea  GI: as per HPI  : No increased frequency, dysuria, hematuria, nocturia  MSK: No joint pain/swelling; no back pain; no edema  Neuro: No dizziness/lightheadedness, weakness, seizures, numbness, tingling  Psych: No significant nervousness, anxiety, stress, depression    All other systems were reviewed and are negative, except as noted.      LABS/STUDIES  --------------------------------------------------------------------------------              8.4    16.07 >-----------<  302      [08-29-21 @ 00:36]              27.6     138  |  100  |  34  ----------------------------<  153      [08-29-21 @ 00:36]  4.6   |  27  |  0.50        Ca     10.9     [08-29-21 @ 00:36]      Mg     2.0     [08-29-21 @ 00:36]      Phos  2.6     [08-29-21 @ 00:36]    TPro  8.9  /  Alb  3.4  /  TBili  0.6  /  DBili  x   /  AST  57  /  ALT  118  /  AlkPhos  330  [08-29-21 @ 00:36]              [08-29-21 @ 00:36]    Blood Gas Arterial - Calcium, Ionized: 1.43 mmol/L (08-29-21 @ 00:31)  Blood Gas Arterial - Calcium, Ionized: 1.43 mmol/L (08-28-21 @ 01:02)    Glucose, Serum: 153 mg/dL (08-29-21 @ 00:36)    Prealbumin, Serum: 11 mg/dL (08-16-21 @ 04:01)  Prealbumin, Serum: 10 mg/dL (08-15-21 @ 13:53)          08-28-21 @ 07:01  -  08-29-21 @ 07:00  --------------------------------------------------------  IN: 1370 mL / OUT: 575 mL / NET: 795 mL    08-29-21 @ 07:01  -  08-29-21 @ 09:41  --------------------------------------------------------  IN: 155 mL / OUT: 0 mL / NET: 155 mL        VITALS/PHYSICAL EXAM  --------------------------------------------------------------------------------  T(C): 36.2 (08-29-21 @ 08:00), Max: 36.3 (08-28-21 @ 20:00)  HR: 126 (08-29-21 @ 09:00) (78 - 135)  BP: --  RR: 34 (08-29-21 @ 09:00) (18 - 45)  SpO2: 100% (08-29-21 @ 09:00) (95% - 100%)  Wt(kg): --    Physical Exam:    Gen:  Sitting in chair in NAD. Awake and alert, NAD  HEENT: NCAT PERRL responds to all commands  Neck: no noted JVD, +Trach Collar , trachea midline  Chest: non labored breathing, equal chest expansion bilaterally: +LVAD  Abd: Soft, minimally-moderately tender in RUQ by perc dawn site, NT everywhere else, positive BS throughout. Perc dawn drain site c/d/i draining bilious fluid.  Skin: No rashes noted and warm  Ext:  WWP no edema noted.  .

## 2021-08-29 NOTE — PROGRESS NOTE ADULT - SUBJECTIVE AND OBJECTIVE BOX
RICKY JOINT  MRN#: [id number]  Subjective:  Pulmonary progress  : recurrent Acute hypoxic respiratory Failure ,aspiration pneumonia, NICM  , chart reviewed and H/O obtained radiological and Laboratory study reviewed patient Examined     65M PMH ACC/AHA stage D HF due to NICM HM2 LVAD , TV annuloplasty ring 17 as destination therapy due to severe peripheral artery disease with significant stenosis  SIADH, Depression, CKD-3 with hyperkalemia, past E. coli UTIs, driveline drainage (21) and COVID-19 (back in 2020)  He was recently seen in clinic where he complained of abdominal pain and dark stools w constipation back in May. He presents to [facility] ER today weakness and fatigue, moderate and + Black stools for three days, on coumadin secondary to warfarin use in the setting of an LVAD. Patient has required transfusions for GIB in the past. Mostly recently back in 2021 pt had anemia with dark stools. No interventions was done at that time. However Last Endoscopy was done in 2020 (negative). Today labs show patient is anemic with H/H of 4.5/16.3,. INR is 8.84 MAP in the 90s, Temp 35.1. He denies any chest pain, shortness of breath, dizziness, abd pain, nausea or vomiting. found to have  rectal bleeding underwent endoscopy ,old blood in the proximal ileum ,  develop sepsis with LL opacity given Antibiotics , Extubated , reintubated , Bronchoscopy on Zosyn for LL pneumonia  and Amiodarone S/P TV Annuloplasty , patient remain intubated on full ventilatory support .S/P multiple units of blood transfusion , remain on full ventilatory support on Precedex and propofol , new central IJ line , diarrhea C diff. +ve on po vancomycin and IV Flagyl,  mildly distended belly , fever start on cefepime 2gm q 8 hrs S/P tracheostomy .  new RT Subclavian central line continue on contact  isolation ,still diarrhea on C-diff antibiotics ENT follow up appreciated , trial of C-PAP as tolerated , , copious secretion from trach. site chest x ray left lower lobe pneumonia , tolerating trch. collar 50% FI02 still excessive secretion need pulmonary toilet , off Ancef antibiotic , no more diarrhea back on full support mechanical ventilator , chest x ray show improvement in LLL air space disease, more awake and responsive on tube feeding no more diarrhea , S/P  Ancef for bacteremia no fever ,ID follow up noted ,  no nausea or vomiting or diarrhea still very weak and tired , event note pulled NG tube now replaced , back on tube feeding ,still on po vancomycin , getting PT and OT at bed side , no plan for decannulation for now. , no more diarrhea receiving PT and OPT at bed side , minimal secretion from tracheostomy site , no SOB getting stronger , improve muscle tone patient transfer to monitor bed still on contact isolation for C-Difficel colitis on 50% FI02, NG tube clogged , and change to resume tube feeding still loose stool . H/H drop significantly require blood transfusion , most likely GI bleeding , IV heparin D/C , vomiting 200 cc of creamy color tube feeding on hold no sob, still melena monitor in the CTU possible capsule endoscopy , H/H is stable ., patient develop TR sided  pneumothorax require chest tube placement , RT IJ central line  placed , develop fever shaking chills , blood culture positive for serratia marcescens , start on cefepime .the patient  become hypoxic and hypotensive placed on full ventilatory support and Vasopressin , levophed and Dobutamine ,S/P blood transfusion on meropenem and vancomycin ,   , on and  off pressors , occasional agitation on Precedex .S/P IR cholecystostomy tube drainage placement in the RT upper Quadrant , resume anticoagulation chest x ray noted C-PAP trail lasted only for 2 hrs , new RT SC line and D/C RT IJ line , RT pig tail cathter has been removed , tolerating C-PAP trial placed on trach. collar 50% FI02 GI consultant noted on NG tube feeding as tolerated , develop AF RVR S/P  bolus Amiodarone  back to regular sinus Rhythm , Flat Affect depressed , back on tube feeding Vital AF at 60 cc/ hr .still intermittent abdominal pain , no fever saturation is accepted  back on full ventilatory support , tired and sleepy on round  .start  back on  tube feeding . tolerating it very well , no nausea or vomiting , good 02 saturation on trac-collar on methimazole for hyperthyroidism , no new C/O no plan for decannulation yet , electrolyte blood test is still pending .on respiratory isolation sputum culture is negative , increase WBC  improved on cefepime , sputum positive for enterococcus , condition is the same , white count is improving        (2021 16:57)    PAST MEDICAL & SURGICAL HISTORY:  CHF (congestive heart failure)    CAD (coronary artery disease)  Depression    Pleural effusion    History of 2019 novel coronavirus disease (COVID-19)  2020    Hemorrhoids    Bleeding hemorrhoids    Peripheral arterial disease    Claudication    BPH with urinary obstruction    ACC/AHA stage D systolic heart failure  Anticoagulation goal of INR 2.0 to 2.5    Falls    Clavicle fracture    CKD (chronic kidney disease), stage III    Iron deficiency anemia    H/O epistaxis    Vertigo    GI bleed    S/P TVR (tricuspid valve repair)    S/P ventricular assist device    S/P endoscopy    OBJECTIVE:  ICU Vital Signs Last 24 Hrs  T(C): 38.2 (2021 10:00), Max: 38.5 (2021 12:00)  T(F): 100.8 (2021 10:00), Max: 101.3 (2021 12:00)  HR: 65 (2021 10:00) (61 - 69)  BP: --  BP(mean): --  ABP: 105/67 (2021 10:00) (90/54 - 113/64)  ABP(mean): 77 (2021 10:00) (63 - 77)  RR: 20 (2021 10:00) (19 - 35)  SpO2: 99% (2021 10:00) (96% - 100%)       @ 07: @ 07:00  --------------------------------------------------------  IN: 2693.9 mL / OUT: 1415 mL / NET: 1278.9 mL     @ 07: @ 10:49  --------------------------------------------------------  IN: 420.8 mL / OUT: 115 mL / NET: 305.8 mL  PHYSICAL EXAM:Daily   Elderly male S/P tracheostomy on trach. collar 50% FI02 ,  Daily Weight in k.4 (2021 00:00)  HEENT:     + NCAT  + EOMI  - Conjuctival edema   - Icterus   - Thrush   - ETT  + NGT/OGT  Neck:         + FROM  RT IJ  line JVD  - Nodes - Masses + Mid-line trachea + Tracheostomy  Chest:            normal A-P diameter    Lungs:          + CTA   + Rhonchi    - Rales    - Wheezing + Decreased  LT BS   - Dullness R L  Cardiac:       + S1 + S2    + RRR   - Irregular   - S3  - S4    - Murmurs   - Rub   - Hamman’s sign   Abdomen:    + BS  + Soft + Non-tender - Distended - Organomegaly - PEG .cholecystostomy tube in place  Extremities:   - Cyanosis U/L   - Clubbing  U/L  + LE/UE Edema   + Capillary refill    + Pulses   Neuro:        - Awake   -  Alert   - Confused   - Lethargic   + Sedated  + Generalized Weakness  Skin:        - Rashes    - Erythema   + Normal incisions   + IV sites intact          HOSPITAL MEDICATIONS: All medications reviewed and analyzed  MEDICATIONS  (STANDING):  amiodarone    Tablet 200 milliGRAM(s) Oral daily  chlorhexidine 0.12% Liquid 15 milliLiter(s) Oral Mucosa every 12 hours  chlorhexidine 2% Cloths 1 Application(s) Topical <User Schedule>  dexmedetomidine Infusion 0.5 MICROgram(s)/kG/Hr (9.81 mL/Hr) IV Continuous <Continuous>  dextrose 50% Injectable 50 milliLiter(s) IV Push every 15 minutes  heparin  Infusion 400 Unit(s)/Hr (12.5 mL/Hr) IV Continuous <Continuous>  Hydromorphone  Injectable 0.5 milliGRAM(s) IV Push once  insulin lispro (ADMELOG) corrective regimen sliding scale   SubCutaneous every 6 hours  pantoprazole  Injectable 40 milliGRAM(s) IV Push every 12 hours  piperacillin/tazobactam IVPB.. 3.375 Gram(s) IV Intermittent every 8 hours  propofol Infusion 20 MICROgram(s)/kG/Min (9.42 mL/Hr) IV Continuous <Continuous>  sodium chloride 0.9% lock flush 3 milliLiter(s) IV Push every 8 hours  sodium chloride 0.9%. 1000 milliLiter(s) (10 mL/Hr) IV Continuous <Continuous>    MEDICATIONS  (PRN):  acetaminophen    Suspension .. 650 milliGRAM(s) Oral every 6 hours PRN Temp greater or equal to 38C (100.4F)    LABS: All Lab data reviewed and analyzed                         8.4    16.07 )-----------( 302      ( 29 Aug 2021 00:36 )             27.6             08-    138  |  100  |  34<H>  ----------------------------<  153<H>  4.6   |  27  |  0.50    Ca    10.9<H>      29 Aug 2021 00:36  Phos  2.6     -  Mg     2.0         TPro  8.9<H>  /  Alb  3.4  /  TBili  0.6  /  DBili  x   /  AST  57<H>  /  ALT  118<H>  /  AlkPhos  330<H>      Ca    10.9<H>      28 Aug 2021 01:07  Phos  3.2     -  Mg     2.4         TPro  9.2<H>  /  Alb  3.5  /  TBili  0.6  /  DBili  x   /  AST  79<H>  /  ALT  118<H>  /  AlkPhos  327<H>      Ca    11.1<H>      27 Aug 2021 01:26  Phos  5.5     -  Mg     2.5         TPro  9.2<H>  /  Alb  3.7  /  TBili  0.8  /  DBili  x   /  AST  58<H>  /  ALT  87<H>  /  AlkPhos  312<H>      x   |  x   |  x   ----------------------------<  x   5.1   |  x   |  x     Ca    10.7<H>      26 Aug 2021 01:00  Phos  3.6     -  Mg     2.2         TPro  9.7<H>  /  Alb  3.6  /  TBili  0.6  /  DBili  x   /  AST  53<H>  /  ALT  68<H>  /  AlkPhos  332<H>        Ca    11.0<H>      25 Aug 2021 00:59  Phos  4.5     -  Mg     2.0         TPro  9.6<H>  /  Alb  3.8  /  TBili  0.6  /  DBili  x   /  AST  48<H>  /  ALT  53<H>  /  AlkPhos  316<H>      Ca    10.1      24 Aug 2021 00:47  Phos  3.6     -  Mg     1.8         TPro  8.8<H>  /  Alb  3.5  /  TBili  0.4  /  DBili  x   /  AST  30  /  ALT  35  /  AlkPhos  250<H>      Ca    9.8      23 Aug 2021 00:36  Phos  3.2     -  Mg     1.8         TPro  8.4<H>  /  Alb  3.3  /  TBili  0.3  /  DBili  x   /  AST  26  /  ALT  35  /  AlkPhos  217<H>                                                                                                     PTT - ( 2021 04:52 )  PTT:45.2 sec LIVER FUNCTIONS - ( 2021 00:42 )  Alb: 3.4 g/dL / Pro: 6.7 g/dL / ALK PHOS: 213 U/L / ALT: 15 U/L / AST: 24 U/L / GGT: x           RADIOLOGY: - Reviewed and analyzed RT Pig tail cathter  , LVAD HM2, CT scan of abdomen reviewed result noted

## 2021-08-30 NOTE — PROGRESS NOTE ADULT - SUBJECTIVE AND OBJECTIVE BOX
INFECTIOUS DISEASES FOLLOW UP-- Anitra Prater  [phone]    This is a follow up note for this  66yMale with  Anemia    Acute cholecystitis        ROS:  CONSTITUTIONAL:  No fever, good appetite  CARDIOVASCULAR:  No chest pain or palpitations  RESPIRATORY:  No dyspnea  GASTROINTESTINAL:  No nausea, vomiting, diarrhea, or abdominal pain  GENITOURINARY:  No dysuria  NEUROLOGIC:  No headache,     Allergies    Amiodarone Hydrochloride (Other (Severe))    Intolerances        ANTIBIOTICS/RELEVANT:  antimicrobials  cefepime   IVPB      cefepime   IVPB 1000 milliGRAM(s) IV Intermittent every 8 hours  vancomycin    Solution 125 milliGRAM(s) Oral every 6 hours    immunologic:    OTHER:  heparin   Injectable 5000 Unit(s) SubCutaneous every 8 hours  hydrocortisone sodium succinate Injectable 50 milliGRAM(s) IV Push every 8 hours  insulin lispro (ADMELOG) corrective regimen sliding scale   SubCutaneous every 6 hours  insulin NPH human recombinant 10 Unit(s) SubCutaneous every 6 hours  methimazole 20 milliGRAM(s) Oral <User Schedule>  metoclopramide Injectable 10 milliGRAM(s) IV Push every 8 hours  mirtazapine 7.5 milliGRAM(s) Oral daily  multiple electrolytes Injection Type 1 1000 milliLiter(s) IV Continuous <Continuous>  multivitamin 1 Tablet(s) Oral daily  ondansetron Injectable 4 milliGRAM(s) IV Push every 6 hours PRN  pantoprazole  Injectable 40 milliGRAM(s) IV Push every 12 hours  propranolol 40 milliGRAM(s) Oral every 6 hours  sertraline 50 milliGRAM(s) Oral daily  simethicone 80 milliGRAM(s) Chew every 8 hours PRN  thiamine 100 milliGRAM(s) Oral daily      Objective:  Vital Signs Last 24 Hrs  T(C): 35.8 (30 Aug 2021 16:00), Max: 36.2 (29 Aug 2021 20:00)  T(F): 96.4 (30 Aug 2021 16:00), Max: 97.1 (29 Aug 2021 20:00)  HR: 110 (30 Aug 2021 18:00) (77 - 121)  BP: --  BP(mean): --  RR: 13 (30 Aug 2021 18:00) (13 - 41)  SpO2: 100% (30 Aug 2021 18:00) (96% - 100%)    PHYSICAL EXAM:  Constitutional:no acute distress  Eyes:ALONSO, EOMI  Ear/Nose/Throat: no oral lesions, 	  Respiratory: clear BL  Cardiovascular: S1S2  Gastrointestinal:soft, (+) BS, no tenderness  Extremities:no e/e/c  No Lymphadenopathy  IV sites not inflammed.    LABS:                        9.1    16.60 )-----------( 273      ( 30 Aug 2021 01:06 )             29.1     08-30    137  |  98  |  28<H>  ----------------------------<  104<H>  4.7   |  26  |  0.49<L>    Ca    10.5      30 Aug 2021 01:06  Phos  3.2     08-30  Mg     1.9     08-30    TPro  9.0<H>  /  Alb  3.4  /  TBili  0.8  /  DBili  x   /  AST  50<H>  /  ALT  116<H>  /  AlkPhos  319<H>  08-30          MICROBIOLOGY:            RECENT CULTURES:  08-27 @ 14:30  .Body Fluid Cholecystostomy drain  --  --  --    No growth  --  08-27 @ 03:23  .Blood Blood-Peripheral  --  --  --    No growth to date.  --  08-26 @ 22:58  .Blood Blood  --  --  --    No growth to date.  --  08-26 @ 22:56  .Blood Blood  --  --  --    No growth to date.  --      RADIOLOGY & ADDITIONAL STUDIES: INFECTIOUS DISEASES FOLLOW UP-- Anitra Prater  [phone]    This is a follow up note for this  66yMale with  Anemia    Acute cholecystitis        ROS:  CONSTITUTIONAL:  No fever, good appetite  CARDIOVASCULAR:  No chest pain or palpitations  RESPIRATORY:  No dyspnea  GASTROINTESTINAL:  No nausea, vomiting, diarrhea, or abdominal pain  GENITOURINARY:  No dysuria  NEUROLOGIC:  No headache,     Allergies    Amiodarone Hydrochloride (Other (Severe))    Intolerances        ANTIBIOTICS/RELEVANT:  antimicrobials  cefepime   IVPB      cefepime   IVPB 1000 milliGRAM(s) IV Intermittent every 8 hours  vancomycin    Solution 125 milliGRAM(s) Oral every 6 hours    immunologic:    OTHER:  heparin   Injectable 5000 Unit(s) SubCutaneous every 8 hours  hydrocortisone sodium succinate Injectable 50 milliGRAM(s) IV Push every 8 hours  insulin lispro (ADMELOG) corrective regimen sliding scale   SubCutaneous every 6 hours  insulin NPH human recombinant 10 Unit(s) SubCutaneous every 6 hours  methimazole 20 milliGRAM(s) Oral <User Schedule>  metoclopramide Injectable 10 milliGRAM(s) IV Push every 8 hours  mirtazapine 7.5 milliGRAM(s) Oral daily  multiple electrolytes Injection Type 1 1000 milliLiter(s) IV Continuous <Continuous>  multivitamin 1 Tablet(s) Oral daily  ondansetron Injectable 4 milliGRAM(s) IV Push every 6 hours PRN  pantoprazole  Injectable 40 milliGRAM(s) IV Push every 12 hours  propranolol 40 milliGRAM(s) Oral every 6 hours  sertraline 50 milliGRAM(s) Oral daily  simethicone 80 milliGRAM(s) Chew every 8 hours PRN  thiamine 100 milliGRAM(s) Oral daily      Objective:  Vital Signs Last 24 Hrs  T(C): 35.8 (30 Aug 2021 16:00), Max: 36.2 (29 Aug 2021 20:00)  T(F): 96.4 (30 Aug 2021 16:00), Max: 97.1 (29 Aug 2021 20:00)  HR: 110 (30 Aug 2021 18:00) (77 - 121)  BP: --  BP(mean): --  RR: 13 (30 Aug 2021 18:00) (13 - 41)  SpO2: 100% (30 Aug 2021 18:00) (96% - 100%)    PHYSICAL EXAM:  Constitutional:no acute distress  Eyes:ALONSO, EOMI  Ear/Nose/Throat: no oral lesions, trach collar  NG feeding tube in place	  Respiratory: audible bilateral  Cardiovascular: S1S2  Gastrointestinal:soft, (+) BS, no tenderness  cholecystotomy tube with bilioius drainage no pus  Extremities:no e/e/c  No Lymphadenopathy  IV sites not inflammed.    LABS:                        9.1    16.60 )-----------( 273      ( 30 Aug 2021 01:06 )             29.1     08-30    137  |  98  |  28<H>  ----------------------------<  104<H>  4.7   |  26  |  0.49<L>    Ca    10.5      30 Aug 2021 01:06  Phos  3.2     08-30  Mg     1.9     08-30    TPro  9.0<H>  /  Alb  3.4  /  TBili  0.8  /  DBili  x   /  AST  50<H>  /  ALT  116<H>  /  AlkPhos  319<H>  08-30          MICROBIOLOGY:            RECENT CULTURES:  08-27 @ 14:30  .Body Fluid Cholecystostomy drain  --  --  --    No growth  --  08-27 @ 03:23  .Blood Blood-Peripheral  --  --  --    No growth to date.  --  08-26 @ 22:58  .Blood Blood  --  --  --    No growth to date.  --  08-26 @ 22:56  .Blood Blood  --  --  --    No growth to date.  --      RADIOLOGY & ADDITIONAL STUDIES:    < from: Xray Chest 1 View- PORTABLE-Urgent (Xray Chest 1 View- PORTABLE-Urgent .) (08.29.21 @ 23:49) >    The heart is slightly enlarged. An NG was placed and the tip is either in the antrum of the stomach or the pylorus. No change in the appearance of the lungs when compared to previous study done on the same date at 7:05 AM. All life supporting devices are in good position andunchanged as well. No pleural effusion. No pneumothorax.    < end of copied text >

## 2021-08-30 NOTE — PROGRESS NOTE ADULT - PROBLEM SELECTOR PLAN 2
-HbA1c 5.4%  -Increase NPH to 10 units sq q6  -continue moderate scale sq q6  -BS testing q6  -Discussed with ELVIE Beatty MD  Endocrinology Attending  Mondays and Tuesdays 9am-6pm: [phone] (pager)  Other days, night and weekend: [phone]

## 2021-08-30 NOTE — PROGRESS NOTE ADULT - SUBJECTIVE AND OBJECTIVE BOX
RICKY HAMPTON  MRN-[id number]  Patient is a 66y old  Male who presents with a chief complaint of Anemia, Supratherapeutic INR, Dark Stools (29 Aug 2021 16:04)    HPI:  65M PMH ACC/AHA stage D HF due to NICM HM2 LVAD , TV annuloplasty ring 17 as destination therapy due to severe peripheral artery disease with significant stenosis  SIADH, Depression, CKD-3 with hyperkalemia, past E. coli UTIs, driveline drainage (21) and COVID-19 (back in 2020)  He was recently seen in clinic where he complained of abdominal pain and dark stools w constipation back in May. He presents to [facility] ER today weakness and fatigue, moderate and + Black stools for three days, on coumadin secondary to warfarin use in the setting of an LVAD. Patient has required transfusions for GIB in the past. Mostly recently back in 2021 pt had anemia with dark stools. No interventions was done at that time. However Last Endoscopy was done in 2020 (negative). Today labs show patient is anemic with H/H of 4.5/16.3,. INR is 8.84 MAP in the 90s, Temp 35.1. He denies any chest pain, shortness of breath, dizziness, abd pain, nausea or vomiting.    (2021 16:57)      Surgery/Hospital Course:   admit for melena w/ anemia, INR 8.84   6/15 Capsul study (+) for small bowel bleed, balloon endoscopy (old blood in prox ileum); post EGD - septic w/ L opacity, re-intubated for concern for aspiration, TTE (Mod MR, decrease biV w/ interventricular septum boweing towards R)   bronch    +C Diff    TC    CT C/A/P: Fluid filled colon which may be 2/2 rapid transit. Small bilateral pleffs with associates. Compressive atelectasis New ISABELLE & LLL  parenchymal opacities, suspicious for pneumonia. 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    #8 Shiley trach at bedside    CPAP trials    LVAD speed increased to 9200   Bronch; Central line dc'ed   TC since , continue as tolerated. Patient transferred to SDU.    Cont PT, no trach downsize today(#8cuffed)/ Anticoagulation/ Continue TF, speech f/u/ Vanco taper    oob to chair  INR  2.47  5 mg coumadin     increased lop to 25 q8  per HF   INR today 2.64.  H&H 7.3 this AM.  Will repeat CBC at noon, and will send stool guaiac Patient with persistent abdominal tenderness, rate of tube feeds decreased.  No nausea/vomiting.     INR today 2.4H&H 9.1.6 low flow overnight /N&V  NPO resolved for capsule study  Coumadin on hold refusing Tube feeds on D5 1/2 normal  @50 cc/hr   INR 2.69  H&H 7..1 refusing Tube feeds on D5 1/2 normal  @50 cc/hr. This am + BM Anusha Oneill HF  aware- PRBC x1  GI team consulted -  NPO  plan on study in am-  D/w Dr Cadet Patient  to return to CTU for further management; 1PRBCS    Post op INR 2.2 today.  No bleeding. BC + for SM.  Pt is hypotensive requiring pressor and inotropic support.  ID follow up today on Cefepime will follow.   R PTC for PTX    CT C/A/P: sub q emphysema in R chest wall, GGO RUL, small ascites CTH negative; Abd US: GB thickening, pericholecystic fluid     Perchole drain in place continues to drain total output overnight 133.  Fever today 38.8 given tylenol.  Will repeat BC now.     duplex LE negative    Patient with persistent abdominal pain, refusing tube feeds and medications, Psych consulted   CTA A/P ordered to r/o mesenteric ischemia 2/2 persistent anorexia, nausea, vomiting. Revealed:  Evaluation of the mesenteric vessels is limited by streak artifact from LVAD. There appears to be severe stenosis of the proximal SMA; abdominal mesenteric doppler is recommended for further evaluation. 2.  No small bowel findings to suggest acute mesenteric ischemia. 3.  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  8/15: Cultures resulted BC 1/2 +GPC in clusters, SC enterobacter; mesenteric duplex: borderline stenosis of proximal SMA   : CT C/A/P noncon: Nondular opacities in R lung apex w/cavitation, abd nl     Today:  Tolerating tube feeds, Vital 1.5 shantelle @ 35cc/hr, plan to increase to 40cc/hr. Continue increasing 5cc/day for final goal of 65cc/hr. Cefepime in setting of recently elevated WBC, continue for 2 more days. Will d/c inhaled tobramycin.     REVIEW OF SYSTEMS:  Patient speaks over trach   Gen: No fever  EYES/ENT: No visual changes;  No vertigo or throat pain   NECK: No pain   RES:  No shortness of breath or Cough  CARD: No chest pain   GI: No abdominal pain  : No dysuria  NEURO: No weakness  SKIN: No itching, rashes     ICU Vital Signs Last 24 Hrs  T(C): 35.8 (30 Aug 2021 08:00), Max: 36.3 (29 Aug 2021 12:00)  T(F): 96.4 (30 Aug 2021 08:00), Max: 97.4 (29 Aug 2021 12:00)  HR: 106 (30 Aug 2021 08:40) (77 - 126)  BP: --  BP(mean): --  ABP: 77/62 (30 Aug 2021 08:40) (75/63 - 108/79)  ABP(mean): 69 (30 Aug 2021 08:40) (69 - 94)  RR: 28 (30 Aug 2021 08:40) (13 - 41)  SpO2: 100% (30 Aug 2021 08:40) (96% - 100%)      Physical Exam:  Gen:  Sitting in chair in NAD. Awake and alert, NAD  Psych: Mood greatly improved, more interactive and participating in care.  CNS:  intact, nonfocal, responds to all commands  Neck: no JVD, +TC   RES : course breath sounds, no wheezing              CVS: +LVAD hum   Abd: Soft, minimally-moderately tender in RUQ by perc dawn site, NT everywhere else, positive BS throughout. Perc dawn drain site c/d/i draining bilious fluid.  Skin: No rash  Ext:  no edema    ============================I/O===========================   I&O's Detail    29 Aug 2021 07:01  -  30 Aug 2021 07:00  --------------------------------------------------------  IN:    Enteral Tube Flush: 160 mL    IV PiggyBack: 50 mL    Miscellaneous Tube Feedin mL    multiple electrolytes Injection Type 1.: 690 mL  Total IN: 1620 mL    OUT:    Drain (mL): 250 mL    Voided (mL): 700 mL  Total OUT: 950 mL    Total NET: 670 mL      30 Aug 2021 07:01  -  30 Aug 2021 08:57  --------------------------------------------------------  IN:    Miscellaneous Tube Feedin mL    multiple electrolytes Injection Type 1.: 30 mL  Total IN: 65 mL    OUT:    Voided (mL): 0 mL  Total OUT: 0 mL    Total NET: 65 mL        ============================ LABS =========================                        9.1    16.60 )-----------( 273      ( 30 Aug 2021 01:06 )             29.1         137  |  98  |  28<H>  ----------------------------<  104<H>  4.7   |  26  |  0.49<L>    Ca    10.5      30 Aug 2021 01:06  Phos  3.2     30  Mg     1.9         TPro  9.0<H>  /  Alb  3.4  /  TBili  0.8  /  DBili  x   /  AST  50<H>  /  ALT  116<H>  /  AlkPhos  319<H>      LIVER FUNCTIONS - ( 30 Aug 2021 01:06 )  Alb: 3.4 g/dL / Pro: 9.0 g/dL / ALK PHOS: 319 U/L / ALT: 116 U/L / AST: 50 U/L / GGT: x             ABG - ( 29 Aug 2021 00:31 )  pH, Arterial: 7.43  pH, Blood: x     /  pCO2: 49    /  pO2: 127   / HCO3: 32    / Base Excess: 7.3   /  SaO2: 99.7                ======================Micro/Rad/Cardio=================  Culture: Reviewed   CXR: Reviewed  Echo:Reviewed  ======================================================  PAST MEDICAL & SURGICAL HISTORY:  CHF (congestive heart failure)    CAD (coronary artery disease)    Depression    Pleural effusion    History of  novel coronavirus disease (COVID-19)  2020    Hemorrhoids    Bleeding hemorrhoids    Peripheral arterial disease    Claudication    BPH with urinary obstruction    ACC/AHA stage D systolic heart failure    Anticoagulation goal of INR 2.0 to 2.5    Falls    Clavicle fracture    CKD (chronic kidney disease), stage III    Iron deficiency anemia    H/O epistaxis    Vertigo    GI bleed    S/P TVR (tricuspid valve repair)    S/P ventricular assist device    S/P endoscopy      ====================ASSESSMENT ==============  Stage D Nonischemic Cardiomyopathy, Status Post HM2 on 2017    Cardiogenic shock  Hemodynamic instability   Acute hypoxemic respiratory failure  GI bleed , Status Post Enteroscopy   Anemia, in setting of melena   Chronic Kidney Disease  Stress hyperglycemia   C.diff positive on    Hypovolemic shock  Septic shock  Leukocytosis    Plan:  ====================== NEUROLOGY=====================  Nonfocal, continue to monitor neuro status   C/w mirtazapine and sertraline for depression  PT/Ambulate as tolerated    mirtazapine 7.5 milliGRAM(s) Oral daily  sertraline 50 milliGRAM(s) Oral daily    ==================== RESPIRATORY======================  Acute hypoxemic respiratory failure s/p #8 betzaida trach on , continue TC as tolerated (TC since )  Continue close monitoring of respiratory rate and breathing pattern, following of ABG’s with A-line monitoring, continuous pulse oximetry monitoring.   Worsening secretions, continue suctioning prn   Inhaled tobramycin dc'd this AM     ====================CARDIOVASCULAR==================  Stage D Nonischemic Cardiomyopathy, Status Post HM2 on 2017; LVAD settings 9200, flow 5.8  TTE : reveals at least moderate MR, mild AI, severe global LV syst dysfxn, dec RV fxn   Continue invasive hemodynamic monitoring   Propranolol for rate control of HR 2/2 Hyperthyroidism, titrate as tolerated    propranolol 30 milliGRAM(s) Oral every 6 hours    ===================HEMATOLOGIC/ONC ===================  Acute blood loss anemia, monitor H&H/Plts    Continue monitor LDH daily     VTE prophylaxis, heparin   Injectable 5000 Unit(s) SubCutaneous every 8 hours    ===================== RENAL =========================   Continue monitoring urine output, I&OS, BUN/Cr   Replete lytes PRN. Keep K> 4 and Mg >2     ==================== GASTROINTESTINAL===================  S/p cholecystostomy tube placed on : with IR with -60cc of black bile, will monitor site closely.   Recent emesis on  in setting of abdominal pain, as per GI likely component of illeus given critical illness   CTA A/P : Evaluation of the mesenteric vessels is limited by streak artifact from LVAD. There appears to be severe stenosis of the proximal SMA; abdominal mesenteric doppler is recommended for further evaluation. 2.  No small bowel findings to suggest acute mesenteric ischemia. 3.  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  Mesenteric duplex on 8/15 borderline stenosis of proximal SMA   Tolerating tube feeds, Vital 1.5 shantelle @ 35cc/hr, plan to increase to 40cc/hr. Continue increasing 5cc/day for final goal of 65cc/hr   If emesis persists, will discuss ?intervention for SMA stenosis? with vascular. CT Abd/Pel on  without acute process   Reglan for gut motility  Zofran PRN nausea/vomiting      multiple electrolytes Injection Type 1 1000 milliLiter(s) (30 mL/Hr) IV Continuous <Continuous>  multivitamin 1 Tablet(s) Oral daily  GI prophylaxis, pantoprazole  Injectable 40 milliGRAM(s) IV Push every 12 hours  simethicone 80 milliGRAM(s) Chew every 8 hours PRN Gas  thiamine 100 milliGRAM(s) Oral daily  metoclopramide Injectable 10 milliGRAM(s) IV Push every 8 hours  ondansetron Injectable 4 milliGRAM(s) IV Push every 6 hours PRN Nausea and/or Vomiting    =======================    ENDOCRINE  =====================  Stress hyperglycemia, continue glucose control with admelog sliding scale and NPH   Thyroid US to evaluate for thyroid nodule/goiter in setting of hyperthyroid  - contraindicated at this time 2/2 trach/will do when trach is out  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 as per house endocrinology   Hyperthyroidism, c/w Methimazole     insulin lispro (ADMELOG) corrective regimen sliding scale   SubCutaneous every 6 hours  insulin NPH human recombinant 8 Unit(s) SubCutaneous every 6 hours  methimazole 15 milliGRAM(s) Oral daily  predniSONE  Solution 40 milliGRAM(s) Oral daily    ========================INFECTIOUS DISEASE================  Afebrile, WBC rising 16.07-> 16.60  Continue trending WBC and monitoring fever curve   C/w cefepime in setting of recent elevated WBC, continue for 2 more days   Vancomycin  PO for C.diff prophylaxis   Culture from dawn drain on : NGTD    cefepime   IVPB 1000 milliGRAM(s) IV Intermittent every 8 hours  vancomycin    Solution 125 milliGRAM(s) Oral every 6 hours      Patient requires continuous monitoring with bedside rhythm monitoring, pulse ox monitoring, and intermittent blood gas analysis. Care plan discussed with ICU care team. Patient remained critical and at risk for life threatening decompensation.     By signing my name below, IAgnieskza, attest that this documentation has been prepared under the direction and in the presence of CLAUDIA Rossi   Electronically signed: Cesia Shaffer, 21 @ 08:57    I, Maryann Cullen, personally performed the services described in this documentation. all medical record entries made by the luisibmel were at my direction and in my presence. I have reviewed the chart and agree that the record reflects my personal performance and is accurate and complete  Electronically signed: CLAUDIA Rossi        RICKY HAMPTON  MRN-[id number]  Patient is a 66y old  Male who presents with a chief complaint of Anemia, Supratherapeutic INR, Dark Stools (29 Aug 2021 16:04)    HPI:  65M PMH ACC/AHA stage D HF due to NICM HM2 LVAD , TV annuloplasty ring 17 as destination therapy due to severe peripheral artery disease with significant stenosis  SIADH, Depression, CKD-3 with hyperkalemia, past E. coli UTIs, driveline drainage (21) and COVID-19 (back in 2020)  He was recently seen in clinic where he complained of abdominal pain and dark stools w constipation back in May. He presents to [facility] ER today weakness and fatigue, moderate and + Black stools for three days, on coumadin secondary to warfarin use in the setting of an LVAD. Patient has required transfusions for GIB in the past. Mostly recently back in 2021 pt had anemia with dark stools. No interventions was done at that time. However Last Endoscopy was done in 2020 (negative). Today labs show patient is anemic with H/H of 4.5/16.3,. INR is 8.84 MAP in the 90s, Temp 35.1. He denies any chest pain, shortness of breath, dizziness, abd pain, nausea or vomiting.    (2021 16:57)      Surgery/Hospital Course:   admit for melena w/ anemia, INR 8.84   6/15 Capsul study (+) for small bowel bleed, balloon endoscopy (old blood in prox ileum); post EGD - septic w/ L opacity, re-intubated for concern for aspiration, TTE (Mod MR, decrease biV w/ interventricular septum boweing towards R)   bronch    +C Diff    TC    CT C/A/P: Fluid filled colon which may be 2/2 rapid transit. Small bilateral pleffs with associates. Compressive atelectasis New ISABELLE & LLL  parenchymal opacities, suspicious for pneumonia. 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    #8 Shiley trach at bedside    CPAP trials    LVAD speed increased to 9200   Bronch; Central line dc'ed   TC since , continue as tolerated. Patient transferred to SDU.    Cont PT, no trach downsize today(#8cuffed)/ Anticoagulation/ Continue TF, speech f/u/ Vanco taper    oob to chair  INR  2.47  5 mg coumadin     increased lop to 25 q8  per HF   INR today 2.64.  H&H 7.3 this AM.  Will repeat CBC at noon, and will send stool guaiac Patient with persistent abdominal tenderness, rate of tube feeds decreased.  No nausea/vomiting.     INR today 2.4H&H 9.1.6 low flow overnight /N&V  NPO resolved for capsule study  Coumadin on hold refusing Tube feeds on D5 1/2 normal  @50 cc/hr   INR 2.69  H&H 7..1 refusing Tube feeds on D5 1/2 normal  @50 cc/hr. This am + BM Anusha Oneill HF  aware- PRBC x1  GI team consulted -  NPO  plan on study in am-  D/w Dr Cadet Patient  to return to CTU for further management; 1PRBCS    Post op INR 2.2 today.  No bleeding. BC + for SM.  Pt is hypotensive requiring pressor and inotropic support.  ID follow up today on Cefepime will follow.   R PTC for PTX    CT C/A/P: sub q emphysema in R chest wall, GGO RUL, small ascites CTH negative; Abd US: GB thickening, pericholecystic fluid     Perchole drain in place continues to drain total output overnight 133.  Fever today 38.8 given tylenol.  Will repeat BC now.     duplex LE negative    Patient with persistent abdominal pain, refusing tube feeds and medications, Psych consulted   CTA A/P ordered to r/o mesenteric ischemia 2/2 persistent anorexia, nausea, vomiting. Revealed:  Evaluation of the mesenteric vessels is limited by streak artifact from LVAD. There appears to be severe stenosis of the proximal SMA; abdominal mesenteric doppler is recommended for further evaluation. 2.  No small bowel findings to suggest acute mesenteric ischemia. 3.  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  8/15: Cultures resulted BC 1/2 +GPC in clusters, SC enterobacter; mesenteric duplex: borderline stenosis of proximal SMA   : CT C/A/P noncon: Nondular opacities in R lung apex w/cavitation, abd nl     Today:  Tolerating tube feeds, Vital 1.5 shantelle @ 35cc/hr, plan to increase to 40cc/hr. Continue increasing 5cc/day for final goal of 65cc/hr. Cefepime in setting of recently elevated WBC, continue for 2 more days. Will d/c inhaled tobramycin.     REVIEW OF SYSTEMS:  Patient speaks over trach   Gen: No fever  EYES/ENT: No visual changes;  No vertigo or throat pain   NECK: No pain   RES:  No shortness of breath or Cough  CARD: No chest pain   GI: No abdominal pain  : No dysuria  NEURO: No weakness  SKIN: No itching, rashes     ICU Vital Signs Last 24 Hrs  T(C): 35.8 (30 Aug 2021 08:00), Max: 36.3 (29 Aug 2021 12:00)  T(F): 96.4 (30 Aug 2021 08:00), Max: 97.4 (29 Aug 2021 12:00)  HR: 106 (30 Aug 2021 08:40) (77 - 126)  ABP: 77/62 (30 Aug 2021 08:40) (75/63 - 108/79)  ABP(mean): 69 (30 Aug 2021 08:40) (69 - 94)  RR: 28 (30 Aug 2021 08:40) (13 - 41)  SpO2: 100% (30 Aug 2021 08:40) (96% - 100%)      Physical Exam:  Gen:  Awake and alert, Sitting in chair in NAD.  Psych: Mood greatly improved, more interactive and participating in care. Would like to be more included in bedside rounding.  CNS:  intact, nonfocal, responds to all commands  Neck: no JVD, +TC   RES : course breath sounds, no wheezing              CVS: +LVAD hum   Abd: Soft, RUQ tenderness by perc sybil site. Sybil drain with bilious fluid. Positive BS throughout.  Skin: No rash  Ext:  no edema    ============================I/O===========================   I&O's Detail    29 Aug 2021 07:01  -  30 Aug 2021 07:00  --------------------------------------------------------  IN:    Enteral Tube Flush: 160 mL    IV PiggyBack: 50 mL    Miscellaneous Tube Feedin mL    multiple electrolytes Injection Type 1.: 690 mL  Total IN: 1620 mL    OUT:    Drain (mL): 250 mL    Voided (mL): 700 mL  Total OUT: 950 mL    Total NET: 670 mL      30 Aug 2021 07:01  -  30 Aug 2021 08:57  --------------------------------------------------------  IN:    Miscellaneous Tube Feedin mL    multiple electrolytes Injection Type 1.: 30 mL  Total IN: 65 mL    OUT:    Voided (mL): 0 mL  Total OUT: 0 mL    Total NET: 65 mL        ============================ LABS =========================                        9.1    16.60 )-----------( 273      ( 30 Aug 2021 01:06 )             29.1     08-30    137  |  98  |  28<H>  ----------------------------<  104<H>  4.7   |  26  |  0.49<L>    Ca    10.5      30 Aug 2021 01:06  Phos  3.2     08  Mg     1.9         TPro  9.0<H>  /  Alb  3.4  /  TBili  0.8  /  DBili  x   /  AST  50<H>  /  ALT  116<H>  /  AlkPhos  319<H>  08    LIVER FUNCTIONS - ( 30 Aug 2021 01:06 )  Alb: 3.4 g/dL / Pro: 9.0 g/dL / ALK PHOS: 319 U/L / ALT: 116 U/L / AST: 50 U/L / GGT: x             ABG - ( 29 Aug 2021 00:31 )  pH, Arterial: 7.43  pH, Blood: x     /  pCO2: 49    /  pO2: 127   / HCO3: 32    / Base Excess: 7.3   /  SaO2: 99.7                ======================Micro/Rad/Cardio=================  Culture: Reviewed   CXR: Reviewed  Echo:Reviewed  ======================================================  PAST MEDICAL & SURGICAL HISTORY:  CHF (congestive heart failure)    CAD (coronary artery disease)    Depression    Pleural effusion    History of 2019 novel coronavirus disease (COVID-19)  2020    Hemorrhoids    Bleeding hemorrhoids    Peripheral arterial disease    Claudication    BPH with urinary obstruction    ACC/AHA stage D systolic heart failure    Anticoagulation goal of INR 2.0 to 2.5    Falls    Clavicle fracture    CKD (chronic kidney disease), stage III    Iron deficiency anemia    H/O epistaxis    Vertigo    GI bleed    S/P TVR (tricuspid valve repair)    S/P ventricular assist device    S/P endoscopy      ====================ASSESSMENT ==============  Stage D Nonischemic Cardiomyopathy, Status Post HM2 on 2017    Cardiogenic shock  Hemodynamic instability   Acute hypoxemic respiratory failure  GI bleed , Status Post Enteroscopy   Anemia, in setting of melena   Chronic Kidney Disease  Stress hyperglycemia   C.diff positive on    Hypovolemic shock  Septic shock  Leukocytosis    Plan:  ====================== NEUROLOGY=====================  Nonfocal, continue to monitor neuro status   C/w mirtazapine and sertraline for depression  PT/Ambulate as tolerated    mirtazapine 7.5 milliGRAM(s) Oral daily  sertraline 50 milliGRAM(s) Oral daily    ==================== RESPIRATORY======================  Acute hypoxemic respiratory failure s/p #8 shiley trach on , continue TC as tolerated (TC since )  Continue close monitoring of respiratory rate and breathing pattern, following of ABG’s with A-line monitoring, continuous pulse oximetry monitoring.   Moderate secretions, continue suctioning prn  Inhaled tobramycin dc'd this AM. Continue cefepime for total 7 days.    ====================CARDIOVASCULAR==================  Stage D Nonischemic Cardiomyopathy, Status Post HM2 on 2017; LVAD settings 9200, flow 5.8  TTE : reveals at least moderate MR, mild AI, severe global LV syst dysfxn, dec RV fxn   Continue invasive hemodynamic monitoring   Propranolol for rate control of HR 2/2 Hyperthyroidism, titrate as tolerated    propranolol 40 milliGRAM(s) Oral every 6 hours    ===================HEMATOLOGIC/ONC ===================  Acute blood loss anemia, monitor H&H/Plts    Continue monitor LDH daily     VTE prophylaxis, heparin   Injectable 5000 Unit(s) SubCutaneous every 8 hours    ===================== RENAL =========================  Continue monitoring urine output, I&OS, BUN/Cr   Replete lytes PRN. Keep K> 4 and Mg >2     ==================== GASTROINTESTINAL===================  S/p cholecystostomy tube placed on : with IR with -60cc of black bile, will monitor site closely.   Recent emesis on  in setting of abdominal pain, as per GI likely component of illeus given critical illness   CTA A/P : Evaluation of the mesenteric vessels is limited by streak artifact from LVAD. There appears to be severe stenosis of the proximal SMA; abdominal mesenteric doppler is recommended for further evaluation. 2.  No small bowel findings to suggest acute mesenteric ischemia. 3.  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  Mesenteric duplex on 8/15 borderline stenosis of proximal SMA   Tolerating tube feeds, Vital 1.5 shantelle @ 35cc/hr, plan to increase to 40cc/hr. Continue increasing 5cc/day for final goal of 65cc/hr   If emesis persists, will discuss ?intervention for SMA stenosis? with vascular. CT Abd/Pel on  without acute process   Reglan for gut motility  Zofran PRN nausea/vomiting      multiple electrolytes Injection Type 1 1000 milliLiter(s) (30 mL/Hr) IV Continuous <Continuous>  multivitamin 1 Tablet(s) Oral daily  GI prophylaxis, pantoprazole  Injectable 40 milliGRAM(s) IV Push every 12 hours  simethicone 80 milliGRAM(s) Chew every 8 hours PRN Gas  thiamine 100 milliGRAM(s) Oral daily  metoclopramide Injectable 10 milliGRAM(s) IV Push every 8 hours  ondansetron Injectable 4 milliGRAM(s) IV Push every 6 hours PRN Nausea and/or Vomiting    =======================    ENDOCRINE  =====================  Stress hyperglycemia       - Continue glucose control with admelog sliding scale and NPH - Increased to 10u q6    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      - Propanolol increased to 40q6 for optimal T4-->T3 conversion      - Methimazole increased to 20 mg BID      - Repeat free thyroxine on     insulin lispro (ADMELOG) corrective regimen sliding scale   SubCutaneous every 6 hours  insulin NPH human recombinant 8 Unit(s) SubCutaneous every 6 hours  methimazole 20 milliGRAM(s) Oral BID  Hydrocortisone    ========================INFECTIOUS DISEASE================  Afebrile, WBC downtrending  Continue trending WBC and monitoring fever curve   C/w cefepime in setting of recent elevated WBC, continue for 2 more days   Vancomycin  PO for C.diff prophylaxis   Culture from sybil drain on : NGTD    cefepime   IVPB 1000 milliGRAM(s) IV Intermittent every 8 hours  vancomycin    Solution 125 milliGRAM(s) Oral every 6 hours      Patient requires continuous monitoring with bedside rhythm monitoring, pulse ox monitoring, and intermittent blood gas analysis. Care plan discussed with ICU care team. Patient remained critical and at risk for life threatening decompensation.     By signing my name below, IAgnieszka, attest that this documentation has been prepared under the direction and in the presence of CLAUDIA Rossi   Electronically signed: Cesia Shaffer, 21 @ 08:57    I, Maryann Cullen, personally performed the services described in this documentation. all medical record entries made by the scribe were at my direction and in my presence. I have reviewed the chart and agree that the record reflects my personal performance and is accurate and complete  Electronically signed: CLAUDIA Rossi

## 2021-08-30 NOTE — PROGRESS NOTE ADULT - PROBLEM SELECTOR PLAN 1
-Avoid any tests with IV contrast  -Check TPO and TSH rec ab  -Unable to examine due to his trach. Neither CT chest commented on nodules, will need to discuss with radiology.  -patient will thyrotoxicosis, so would adjust his regimen as thus:  MMI 20mg po bid - watch for biliary issues: alk phos/bilirubin elevations, PTU not MMI is associated with direct hepatotoxicity  Increase propanolol to 40mg po q6 as you need a dose of 160mg/day to decrease peripheral conversion of T4 to T3  Adjust steroids to HC 50mg IVP q6  Recheck a FT4 on Thursday 9/2

## 2021-08-30 NOTE — PROGRESS NOTE ADULT - PROBLEM SELECTOR PLAN 6
- s/p trach 6/25  - had initially been tolerating trach collar, will consider decannulation closer to discharge when nutritionally replete and stronger as he still has significant secretions

## 2021-08-30 NOTE — PROGRESS NOTE ADULT - SUBJECTIVE AND OBJECTIVE BOX
[facility] NUTRITION SUPPORT-- FOLLOW UP NOTE  --------------------------------------------------------------------------------    24 hour events/subjective: pt seen and examined. oob in chair. tf running at 35cc/hr. c/o abd pain and nausea. no vomiting. reports +gi fxn. remains tachy    Diet:  Diet, NPO with Tube Feed:   Tube Feeding Modality: Nasogastric  Vital 1.5 Tank (VITAL1.5RTH)  Total Volume for 24 Hours (mL): 1560  Continuous  Starting Tube Feed Rate mL per Hour: 10  Increase Tube Feed Rate by (mL): 5     Every 24 hours  Until Goal Tube Feed Rate (mL per Hour): 65  Tube Feed Duration (in Hours): 24  Tube Feed Start Time: 11:45  Supplement Feeding Modality:  Nasogastric  Probiotic Yogurt/Smoothie Cans or Servings Per Day:  2       Frequency:  Daily (21 @ 00:07)      PAST HISTORY  --------------------------------------------------------------------------------  No significant changes to PMH, PSH, FHx, SHx, unless otherwise noted    ALLERGIES & MEDICATIONS  --------------------------------------------------------------------------------  Allergies    Amiodarone Hydrochloride (Other (Severe))    Intolerances      Standing Inpatient Medications  cefepime   IVPB      cefepime   IVPB 1000 milliGRAM(s) IV Intermittent every 8 hours  heparin   Injectable 5000 Unit(s) SubCutaneous every 8 hours  insulin lispro (ADMELOG) corrective regimen sliding scale   SubCutaneous every 6 hours  insulin NPH human recombinant 8 Unit(s) SubCutaneous every 6 hours  methimazole 15 milliGRAM(s) Oral daily  metoclopramide Injectable 10 milliGRAM(s) IV Push every 8 hours  mirtazapine 7.5 milliGRAM(s) Oral daily  multiple electrolytes Injection Type 1 1000 milliLiter(s) IV Continuous <Continuous>  multivitamin 1 Tablet(s) Oral daily  pantoprazole  Injectable 40 milliGRAM(s) IV Push every 12 hours  predniSONE  Solution 40 milliGRAM(s) Oral daily  propranolol 30 milliGRAM(s) Oral every 6 hours  sertraline 50 milliGRAM(s) Oral daily  thiamine 100 milliGRAM(s) Oral daily  vancomycin    Solution 125 milliGRAM(s) Oral every 6 hours    PRN Inpatient Medications  ondansetron Injectable 4 milliGRAM(s) IV Push every 6 hours PRN  simethicone 80 milliGRAM(s) Chew every 8 hours PRN        REVIEW OF SYSTEMS  --------------------------------------------------------------------------------    General:  as per hpi  HEENT:  no headaches, no vision changes, no ear pain  CV:  no chest pain, no palpitations  Resp:  see hpi  GI:  as per hpi  :  no pain, no bleeding, no dysuria  Muscle:  no pain, no weakness  Neuro:  no numbness, no tingling, no memory problems  Psych:  no insomnia  Endocrine:  no cold/heat intolerance  Heme: no ecchymosis, no easy bruising  Skin:  no rash, no edema          VITALS/PHYSICAL EXAM  --------------------------------------------------------------------------------  T(C): 35.8 (21 @ 08:00), Max: 36.3 (21 @ 12:00)  HR: 106 (21 @ 08:40) (77 - 122)  BP: --  RR: 28 (21 @ 08:40) (13 - 41)  SpO2: 100% (21 @ 08:40) (96% - 100%)  Wt(kg): --      21 @ 07:01  -  21 @ 07:00  --------------------------------------------------------  IN: 1620 mL / OUT: 950 mL / NET: 670 mL    21 @ 07:01  -  21 @ 09:17  --------------------------------------------------------  IN: 65 mL / OUT: 0 mL / NET: 65 mL      I&O's Detail    29 Aug 2021 07:01  -  30 Aug 2021 07:00  --------------------------------------------------------  IN:    Enteral Tube Flush: 160 mL    IV PiggyBack: 50 mL    Miscellaneous Tube Feedin mL    multiple electrolytes Injection Type 1.: 690 mL  Total IN: 1620 mL    OUT:    Drain (mL): 250 mL    Voided (mL): 700 mL  Total OUT: 950 mL    Total NET: 670 mL      30 Aug 2021 07:  -  30 Aug 2021 09:17  --------------------------------------------------------  IN:    Miscellaneous Tube Feedin mL    multiple electrolytes Injection Type 1.: 30 mL  Total IN: 65 mL    OUT:    Voided (mL): 0 mL  Total OUT: 0 mL    Total NET: 65 mL        Physical Exam:  Gen: oob in chair in nad  HEENT: +trach +ngt   Chest: respirations non labored  Abd: soft generalized ttp nd +perc c-tube  LE: no edema  Neuro: awake alert responsive      LABS/STUDIES  --------------------------------------------------------------------------------              9.1    16.60 >-----------<  273      [21 @ 01:06]              29.1     137  |  98  |  28  ----------------------------<  104      [21 @ 01:06]  4.7   |  26  |  0.49        Ca     10.5     [21 @ 01:06]      Mg     1.9     [21 @ 01:06]      Phos  3.2     [21 01:06]    TPro  9.0  /  Alb  3.4  /  TBili  0.8  /  DBili  x   /  AST  50  /  ALT  116  /  AlkPhos  319  [21 @ 01:06]              [21 @ 01:06]    Ca ionizedBlood Gas Arterial - Calcium, Ionized: 1.43 mmol/L (21 @ 00:31)    Creatinine Trend:  POC glucoseGlucose, Serum: 104 mg/dL (21 @ 01:06)  CAPILLARY BLOOD GLUCOSE  203 (29 Aug 2021 17:00)  188 (29 Aug 2021 12:00)      POCT Blood Glucose.: 160 mg/dL (30 Aug 2021 05:01)  POCT Blood Glucose.: 106 mg/dL (30 Aug 2021 00:43)  POCT Blood Glucose.: 208 mg/dL (29 Aug 2021 17:40)  POCT Blood Glucose.: 188 mg/dL (29 Aug 2021 12:40)    PrealbuminPrealbumin, Serum: 11 mg/dL (21 @ 04:01)  Prealbumin, Serum: 10 mg/dL (08-15-21 @ 13:53)    Triglycerides     [facility] NUTRITION SUPPORT-- FOLLOW UP NOTE  --------------------------------------------------------------------------------    24 hour events/subjective: pt seen and examined. oob in chair. tf running at 35cc/hr. c/o abd pain and nausea. no vomiting. reports +gi fxn. tachy overnight    Diet:  Diet, NPO with Tube Feed:   Tube Feeding Modality: Nasogastric  Vital 1.5 Tank (VITAL1.5RTH)  Total Volume for 24 Hours (mL): 1560  Continuous  Starting Tube Feed Rate mL per Hour: 10  Increase Tube Feed Rate by (mL): 5     Every 24 hours  Until Goal Tube Feed Rate (mL per Hour): 65  Tube Feed Duration (in Hours): 24  Tube Feed Start Time: 11:45  Supplement Feeding Modality:  Nasogastric  Probiotic Yogurt/Smoothie Cans or Servings Per Day:  2       Frequency:  Daily (21 @ 00:07)      PAST HISTORY  --------------------------------------------------------------------------------  No significant changes to PMH, PSH, FHx, SHx, unless otherwise noted    ALLERGIES & MEDICATIONS  --------------------------------------------------------------------------------  Allergies    Amiodarone Hydrochloride (Other (Severe))    Intolerances      Standing Inpatient Medications  cefepime   IVPB      cefepime   IVPB 1000 milliGRAM(s) IV Intermittent every 8 hours  heparin   Injectable 5000 Unit(s) SubCutaneous every 8 hours  insulin lispro (ADMELOG) corrective regimen sliding scale   SubCutaneous every 6 hours  insulin NPH human recombinant 8 Unit(s) SubCutaneous every 6 hours  methimazole 15 milliGRAM(s) Oral daily  metoclopramide Injectable 10 milliGRAM(s) IV Push every 8 hours  mirtazapine 7.5 milliGRAM(s) Oral daily  multiple electrolytes Injection Type 1 1000 milliLiter(s) IV Continuous <Continuous>  multivitamin 1 Tablet(s) Oral daily  pantoprazole  Injectable 40 milliGRAM(s) IV Push every 12 hours  predniSONE  Solution 40 milliGRAM(s) Oral daily  propranolol 30 milliGRAM(s) Oral every 6 hours  sertraline 50 milliGRAM(s) Oral daily  thiamine 100 milliGRAM(s) Oral daily  vancomycin    Solution 125 milliGRAM(s) Oral every 6 hours    PRN Inpatient Medications  ondansetron Injectable 4 milliGRAM(s) IV Push every 6 hours PRN  simethicone 80 milliGRAM(s) Chew every 8 hours PRN        REVIEW OF SYSTEMS  --------------------------------------------------------------------------------    General:  as per hpi  HEENT:  no headaches, no vision changes, no ear pain  CV:  no chest pain, no palpitations  Resp:  see hpi  GI:  as per hpi  :  no pain, no bleeding, no dysuria  Muscle:  no pain, no weakness  Neuro:  no numbness, no tingling, no memory problems  Psych:  no insomnia  Endocrine:  no cold/heat intolerance  Heme: no ecchymosis, no easy bruising  Skin:  no rash, no edema          VITALS/PHYSICAL EXAM  --------------------------------------------------------------------------------  T(C): 35.8 (21 @ 08:00), Max: 36.3 (21 @ 12:00)  HR: 106 (21 @ 08:40) (77 - 122)  BP: --  RR: 28 (21 @ 08:40) (13 - 41)  SpO2: 100% (21 @ 08:40) (96% - 100%)  Wt(kg): --      21 @ 07:01  -  21 @ 07:00  --------------------------------------------------------  IN: 1620 mL / OUT: 950 mL / NET: 670 mL    21 @ 07:01  -  21 @ 09:17  --------------------------------------------------------  IN: 65 mL / OUT: 0 mL / NET: 65 mL      I&O's Detail    29 Aug 2021 07:01  -  30 Aug 2021 07:00  --------------------------------------------------------  IN:    Enteral Tube Flush: 160 mL    IV PiggyBack: 50 mL    Miscellaneous Tube Feedin mL    multiple electrolytes Injection Type 1.: 690 mL  Total IN: 1620 mL    OUT:    Drain (mL): 250 mL    Voided (mL): 700 mL  Total OUT: 950 mL    Total NET: 670 mL      30 Aug 2021 07:  -  30 Aug 2021 09:17  --------------------------------------------------------  IN:    Miscellaneous Tube Feedin mL    multiple electrolytes Injection Type 1.: 30 mL  Total IN: 65 mL    OUT:    Voided (mL): 0 mL  Total OUT: 0 mL    Total NET: 65 mL        Physical Exam:  Gen: oob in chair in nad  HEENT: +trach +ngt   Chest: respirations non labored  Abd: soft generalized ttp nd +perc c-tube  LE: no edema  Neuro: awake alert responsive      LABS/STUDIES  --------------------------------------------------------------------------------              9.1    16.60 >-----------<  273      [21 @ 01:06]              29.1     137  |  98  |  28  ----------------------------<  104      [21 @ 01:06]  4.7   |  26  |  0.49        Ca     10.5     [21 @ 01:06]      Mg     1.9     [21 @ 01:06]      Phos  3.2     [21 01:06]    TPro  9.0  /  Alb  3.4  /  TBili  0.8  /  DBili  x   /  AST  50  /  ALT  116  /  AlkPhos  319  [21 @ 01:06]              [21 @ 01:06]    Ca ionizedBlood Gas Arterial - Calcium, Ionized: 1.43 mmol/L (21 @ 00:31)    Creatinine Trend:  POC glucoseGlucose, Serum: 104 mg/dL (21 @ 01:06)  CAPILLARY BLOOD GLUCOSE  203 (29 Aug 2021 17:00)  188 (29 Aug 2021 12:00)      POCT Blood Glucose.: 160 mg/dL (30 Aug 2021 05:01)  POCT Blood Glucose.: 106 mg/dL (30 Aug 2021 00:43)  POCT Blood Glucose.: 208 mg/dL (29 Aug 2021 17:40)  POCT Blood Glucose.: 188 mg/dL (29 Aug 2021 12:40)    PrealbuminPrealbumin, Serum: 11 mg/dL (21 @ 04:01)  Prealbumin, Serum: 10 mg/dL (08-15-21 @ 13:53)    Triglycerides

## 2021-08-30 NOTE — PROGRESS NOTE ADULT - PROBLEM SELECTOR PLAN 3
- Chronic in nature with questionable SMA stenosis on imaging  - Continue to advance feeds as tolerates  - If abdominal pain continues will re-engage vascular about possibility of stent consideration

## 2021-08-30 NOTE — PROGRESS NOTE ADULT - ASSESSMENT
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 coli UTIs, driveline drainage (1/7/21) and COVID-19 (back in April 2020)  He was recently seen in clinic where he complained of abdominal pain and dark stools w constipation back in May. He presents to [facility] ER today weakness and fatigue, moderate and + Black stools for three days, on coumadin secondary to warfarin use in the setting of an LVAD. Patient has required transfusions for GIB in the past. Mostly recently back in jan 2021 pt had anemia with dark stools. No interventions was done at that time. However Last Endoscopy was done in July 2020 (negative). Today labs show patient is anemic with H/H of 4.5/16.3,. INR is 8.84 MAP in the 90s,   Returned from endoscopy appearing ill tachypneic with chills with new white out of his left lung    Remains with complaints of abdominal pain  leukocytosis significant 30k range   tan secretions from trach site-  recent sputum culture with normal cornelia  completing ten days of IV Cefepime for enterobacter bacteremia- can discontinue  nodular upper lobe infiltrates seen on CT scan  would send trach secretions for fungal stain/culture  AFB stain and culture  check fungitell        Morris Prater MD  [phone]  After 5pm/weekends [phone]          Morris Prater MD  [phone]  After 5pm/weekends [phone]   65M PMH ACC/AHA stage D HF due to NICM HM2 LVAD , TV annuloplasty ring 9/12/17 as destination therapy due to severe peripheral artery disease with significant stenosis  SIADH, Depression, CKD-3 with hyperkalemia, past E. coli UTIs, driveline drainage (1/7/21) and COVID-19 (back in April 2020)  He was recently seen in clinic where he complained of abdominal pain and dark stools w constipation back in May. He presents to [facility] ER today weakness and fatigue, moderate and + Black stools for three days, on coumadin secondary to warfarin use in the setting of an LVAD. Patient has required transfusions for GIB in the past. Mostly recently back in jan 2021 pt had anemia with dark stools. No interventions was done at that time. However Last Endoscopy was done in July 2020 (negative). Today labs show patient is anemic with H/H of 4.5/16.3,. INR is 8.84 MAP in the 90s,   Returned from endoscopy appearing ill tachypneic with chills with new white out of his left lung    Remains with complaints of abdominal pain  afebrile and appears stable  WBC is decreasing to 16k range  would discotninue the Cefepime and observe        Morris Prater MD  [phone]  After 5pm/weekends [phone]

## 2021-08-30 NOTE — PROGRESS NOTE ADULT - ASSESSMENT
67 YO M with a history of stage D NICM s/p HM2 on 9/2017 as DT (due to severe PAD) with TV ring, prior COVID-19 infection 4/2020, recurrent syncope post LVAD s/p ILR, and chronic abdominal pain with prior negative workup who was admitted 6/14/21 with symptomatic anemia with Hgb 4.5 in setting of INR 8.8 without hemodynamic instability and has since had a protracted hospitalization. He was transfused and underwent VCE which showed active bleeding in the mid small bowel but subsequent enteroscopy 6/15 did not reveal any active bleeding. He acutely decompensated after procedure with fever/hypertension, low flow alarms, and pulmonary infiltrate with hypoxia requiring intubation from probable aspiration PNA. He was unable to extubated and has since undergone tracheostomy and while tolerating long periods of trach collar still has persistent secretions preventing ability to decannulate. 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 Overall prognosis is poor. Short term goal is maintenance of adequate nutrition and eventual trach decannulation.

## 2021-08-30 NOTE — PROGRESS NOTE ADULT - PROBLEM SELECTOR PLAN 2
- Continue current speed of 9200 RPM. Speed increased this admission due to signs of inadequately unloaded left ventricle  - MAP is at goal at this time  - Holding coumadin and aspirin indefinitely given recurrent GI bleeding. Continue to monitor LDH daily

## 2021-08-30 NOTE — PROGRESS NOTE ADULT - SUBJECTIVE AND OBJECTIVE BOX
Subjective:  No overnight events    Medications:  cefepime   IVPB      cefepime   IVPB 1000 milliGRAM(s) IV Intermittent every 8 hours  heparin   Injectable 5000 Unit(s) SubCutaneous every 8 hours  hydrocortisone sodium succinate Injectable 50 milliGRAM(s) IV Push every 8 hours  insulin lispro (ADMELOG) corrective regimen sliding scale   SubCutaneous every 6 hours  insulin NPH human recombinant 10 Unit(s) SubCutaneous every 6 hours  methimazole 20 milliGRAM(s) Oral <User Schedule>  metoclopramide Injectable 10 milliGRAM(s) IV Push every 8 hours  mirtazapine 7.5 milliGRAM(s) Oral daily  multiple electrolytes Injection Type 1 1000 milliLiter(s) IV Continuous <Continuous>  multivitamin 1 Tablet(s) Oral daily  ondansetron Injectable 4 milliGRAM(s) IV Push every 6 hours PRN  pantoprazole  Injectable 40 milliGRAM(s) IV Push every 12 hours  propranolol 40 milliGRAM(s) Oral every 6 hours  sertraline 50 milliGRAM(s) Oral daily  simethicone 80 milliGRAM(s) Chew every 8 hours PRN  thiamine 100 milliGRAM(s) Oral daily  vancomycin    Solution 125 milliGRAM(s) Oral every 6 hours    Vitals:  T(C): 35.6 (21 @ 12:00), Max: 36.2 (21 @ 20:00)  HR: 113 (21 @ 15:00) (77 - 121)  BP: --  BP(mean): --  RR: 15 (21 @ 15:00) (13 - 41)  SpO2: 99% (21 @ 15:00) (96% - 100%)    Daily     Daily Weight in k.4 (30 Aug 2021 00:00)        I&O's Summary    29 Aug 2021 07:  -  30 Aug 2021 07:00  --------------------------------------------------------  IN: 1620 mL / OUT: 950 mL / NET: 670 mL    30 Aug 2021 07:  -  30 Aug 2021 16:14  --------------------------------------------------------  IN: 565 mL / OUT: 1 mL / NET: 564 mL        Physical Exam:  Appearance: No Acute Distress, frail appearing  HEENT: JVP non elevated, s/p trach  Cardiovascular: VAD hums  Respiratory: Clear to auscultation bilaterally  Gastrointestinal: Soft, Non-tender, non-distended	  Skin: no skin lesions  Neurologic: Non-focal  Extremities: No LE edema, warm and well perfused  Psychiatry: A & O x 3, Mood & affect appropriate    LVAD interrogation   Flow: 5.5  Speed: 9200  PI: 5.9  Power: 5.5   Alarms: none  Changes to device programming: none       Labs:                        9.1    16.60 )-----------( 273      ( 30 Aug 2021 01:06 )             29.1         137  |  98  |  28<H>  ----------------------------<  104<H>  4.7   |  26  |  0.49<L>    Ca    10.5      30 Aug 2021 01:06  Phos  3.2       Mg     1.9         TPro  9.0<H>  /  Alb  3.4  /  TBili  0.8  /  DBili  x   /  AST  50<H>  /  ALT  116<H>  /  AlkPhos  319<H>                  Lactate Dehydrogenase, Serum: 237 U/L ( @ 01:06)  Lactate Dehydrogenase, Serum: 180 U/L ( @ 00:36)  Lactate Dehydrogenase, Serum: 201 U/L ( @ 01:07)

## 2021-08-30 NOTE — PROGRESS NOTE ADULT - ASSESSMENT
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 coli UTIs, driveline drainage (1/7/21) and COVID-19, here initially for GIB prolonged hospital course, s/p tracheostomy, acalculous cholecystitis s/p perc dawn. Patient has persistent intermittent abdominal pain. Per CTU team, pt has recently been refusing tube feeds and is being evaluated for chronic mesenteric ischemia vs SMA syndrome.  8/13 bld cxs positive. Consulted for TPN. Prealb 11, prior a1c 5.6, tg 141. 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 Tolerating feeds but with intermittent abd pain.     -cont tube feeds- Vital 1.5 and advance to goal as tolerated (goal of 65 ml/hr) as per CTU team, no present plan to initiate TPN, will reassess need pending clinical course  -suspect uncontrolled hyperthyroidism contributing to metabolic issues ie. gi motility, hypercalcemia, tachycardia; further management as per endocrine  -h/o abdominal pain/vomiting- as above, zofran prn for nausea; reglan for gut motility  -hypercalcemia- rec checking vit d/pth levels  -anemia- rec iron supp when feasible  -electrolyte imbalance risk- monitor and replete elytes as needed  -leukocytosis- trend, care per ID  -psych, palliative care, and ethics input noted  -management as per CTU; will remain available as needed, dw team    plan discussed with Martínez, agreeable with above    TPN pager [phone], spectra [phone]  M-F 6A-4P, Weekends and holidays 8A-12P

## 2021-08-30 NOTE — PROGRESS NOTE ADULT - PROBLEM SELECTOR PLAN 4
- serratia bacteremia and poss acalculous cholecystitis. B/c with gram + cocci and many enterobacter Carbapenem resistant strain and now s/p per dawn drain  - also with enterobacter from sputum culture 8/13  - continue cefipime until 9/1 then can discontinue, can discontinue tobramycin nebs. ID recs appreciated  - continue PO vanc for c diff prophylaxis until off systemic antibiotics   - will need eventual consideration for cholecystostomy when optimized

## 2021-08-30 NOTE — PROGRESS NOTE ADULT - SUBJECTIVE AND OBJECTIVE BOX
RICKY JOINT  MRN#: [id number]  Subjective:  Pulmonary progress  : recurrent Acute hypoxic respiratory Failure ,aspiration pneumonia, NICM  , chart reviewed and H/O obtained radiological and Laboratory study reviewed patient Examined     65M PMH ACC/AHA stage D HF due to NICM HM2 LVAD , TV annuloplasty ring 17 as destination therapy due to severe peripheral artery disease with significant stenosis  SIADH, Depression, CKD-3 with hyperkalemia, past E. coli UTIs, driveline drainage (21) and COVID-19 (back in 2020)  He was recently seen in clinic where he complained of abdominal pain and dark stools w constipation back in May. He presents to [facility] ER today weakness and fatigue, moderate and + Black stools for three days, on coumadin secondary to warfarin use in the setting of an LVAD. Patient has required transfusions for GIB in the past. Mostly recently back in 2021 pt had anemia with dark stools. No interventions was done at that time. However Last Endoscopy was done in 2020 (negative). Today labs show patient is anemic with H/H of 4.5/16.3,. INR is 8.84 MAP in the 90s, Temp 35.1. He denies any chest pain, shortness of breath, dizziness, abd pain, nausea or vomiting. found to have  rectal bleeding underwent endoscopy ,old blood in the proximal ileum ,  develop sepsis with LL opacity given Antibiotics , Extubated , reintubated , Bronchoscopy on Zosyn for LL pneumonia  and Amiodarone S/P TV Annuloplasty , patient remain intubated on full ventilatory support .S/P multiple units of blood transfusion , remain on full ventilatory support on Precedex and propofol , new central IJ line , diarrhea C diff. +ve on po vancomycin and IV Flagyl,  mildly distended belly , fever start on cefepime 2gm q 8 hrs S/P tracheostomy .  new RT Subclavian central line continue on contact  isolation ,still diarrhea on C-diff antibiotics ENT follow up appreciated , trial of C-PAP as tolerated , , copious secretion from trach. site chest x ray left lower lobe pneumonia , tolerating trch. collar 50% FI02 still excessive secretion need pulmonary toilet , off Ancef antibiotic , no more diarrhea back on full support mechanical ventilator , chest x ray show improvement in LLL air space disease, more awake and responsive on tube feeding no more diarrhea , S/P  Ancef for bacteremia no fever ,ID follow up noted ,  no nausea or vomiting or diarrhea still very weak and tired , event note pulled NG tube now replaced , back on tube feeding ,still on po vancomycin , getting PT and OT at bed side , no plan for decannulation for now. , no more diarrhea receiving PT and OPT at bed side , minimal secretion from tracheostomy site , no SOB getting stronger , improve muscle tone patient transfer to monitor bed still on contact isolation for C-Difficel colitis on 50% FI02, NG tube clogged , and change to resume tube feeding still loose stool . H/H drop significantly require blood transfusion , most likely GI bleeding , IV heparin D/C , vomiting 200 cc of creamy color tube feeding on hold no sob, still melena monitor in the CTU possible capsule endoscopy , H/H is stable ., patient develop TR sided  pneumothorax require chest tube placement , RT IJ central line  placed , develop fever shaking chills , blood culture positive for serratia marcescens , start on cefepime .the patient  become hypoxic and hypotensive placed on full ventilatory support and Vasopressin , levophed and Dobutamine ,S/P blood transfusion on meropenem and vancomycin ,   , on and  off pressors , occasional agitation on Precedex .S/P IR cholecystostomy tube drainage placement in the RT upper Quadrant , resume anticoagulation chest x ray noted C-PAP trail lasted only for 2 hrs , new RT SC line and D/C RT IJ line , RT pig tail cathter has been removed , tolerating C-PAP trial placed on trach. collar 50% FI02 GI consultant noted on NG tube feeding as tolerated , develop AF RVR S/P  bolus Amiodarone  back to regular sinus Rhythm , Flat Affect depressed , back on tube feeding Vital AF at 60 cc/ hr .still intermittent abdominal pain , no fever saturation is accepted  back on full ventilatory support , tired and sleepy on round  .start  back on  tube feeding . tolerating it very well , no nausea or vomiting , good 02 saturation on trac-collar on methimazole for hyperthyroidism , no new C/O no plan for decannulation yet , electrolyte blood test is still pending .on respiratory isolation sputum culture is negative , increase WBC  improved on cefepime , sputum positive for enterococcus , condition is the same , white count is improving , no chest pain or SOB        (2021 16:57)    PAST MEDICAL & SURGICAL HISTORY:  CHF (congestive heart failure)    CAD (coronary artery disease)  Depression    Pleural effusion    History of 2019 novel coronavirus disease (COVID-19)  2020    Hemorrhoids    Bleeding hemorrhoids    Peripheral arterial disease    Claudication    BPH with urinary obstruction    ACC/AHA stage D systolic heart failure  Anticoagulation goal of INR 2.0 to 2.5    Falls    Clavicle fracture    CKD (chronic kidney disease), stage III    Iron deficiency anemia    H/O epistaxis    Vertigo    GI bleed    S/P TVR (tricuspid valve repair)    S/P ventricular assist device    S/P endoscopy    OBJECTIVE:  ICU Vital Signs Last 24 Hrs  T(C): 38.2 (2021 10:00), Max: 38.5 (2021 12:00)  T(F): 100.8 (2021 10:00), Max: 101.3 (2021 12:00)  HR: 65 (2021 10:00) (61 - 69)  BP: --  BP(mean): --  ABP: 105/67 (2021 10:00) (90/54 - 113/64)  ABP(mean): 77 (2021 10:00) (63 - 77)  RR: 20 (2021 10:00) (19 - 35)  SpO2: 99% (2021 10:00) (96% - 100%)       @ 07: @ 07:00  --------------------------------------------------------  IN: 2693.9 mL / OUT: 1415 mL / NET: 1278.9 mL     @ 07: @ 10:49  --------------------------------------------------------  IN: 420.8 mL / OUT: 115 mL / NET: 305.8 mL  PHYSICAL EXAM:Daily   Elderly male S/P tracheostomy on trach. collar 50% FI02 ,  Daily Weight in k.4 (2021 00:00)  HEENT:     + NCAT  + EOMI  - Conjuctival edema   - Icterus   - Thrush   - ETT  + NGT/OGT  Neck:         + FROM  RT IJ  line JVD  - Nodes - Masses + Mid-line trachea + Tracheostomy  Chest:            normal A-P diameter    Lungs:          + CTA   + Rhonchi    - Rales    - Wheezing + Decreased  LT BS   - Dullness R L  Cardiac:       + S1 + S2    + RRR   - Irregular   - S3  - S4    - Murmurs   - Rub   - Hamman’s sign   Abdomen:    + BS  + Soft + Non-tender - Distended - Organomegaly - PEG .cholecystostomy tube in place  Extremities:   - Cyanosis U/L   - Clubbing  U/L  + LE/UE Edema   + Capillary refill    + Pulses   Neuro:        - Awake   -  Alert   - Confused   - Lethargic   + Sedated  + Generalized Weakness  Skin:        - Rashes    - Erythema   + Normal incisions   + IV sites intact          HOSPITAL MEDICATIONS: All medications reviewed and analyzed  MEDICATIONS  (STANDING):  amiodarone    Tablet 200 milliGRAM(s) Oral daily  chlorhexidine 0.12% Liquid 15 milliLiter(s) Oral Mucosa every 12 hours  chlorhexidine 2% Cloths 1 Application(s) Topical <User Schedule>  dexmedetomidine Infusion 0.5 MICROgram(s)/kG/Hr (9.81 mL/Hr) IV Continuous <Continuous>  dextrose 50% Injectable 50 milliLiter(s) IV Push every 15 minutes  heparin  Infusion 400 Unit(s)/Hr (12.5 mL/Hr) IV Continuous <Continuous>  Hydromorphone  Injectable 0.5 milliGRAM(s) IV Push once  insulin lispro (ADMELOG) corrective regimen sliding scale   SubCutaneous every 6 hours  pantoprazole  Injectable 40 milliGRAM(s) IV Push every 12 hours  piperacillin/tazobactam IVPB.. 3.375 Gram(s) IV Intermittent every 8 hours  propofol Infusion 20 MICROgram(s)/kG/Min (9.42 mL/Hr) IV Continuous <Continuous>  sodium chloride 0.9% lock flush 3 milliLiter(s) IV Push every 8 hours  sodium chloride 0.9%. 1000 milliLiter(s) (10 mL/Hr) IV Continuous <Continuous>    MEDICATIONS  (PRN):  acetaminophen    Suspension .. 650 milliGRAM(s) Oral every 6 hours PRN Temp greater or equal to 38C (100.4F)    LABS: All Lab data reviewed and analyzed                        9.1    16.60 )-----------( 273      ( 30 Aug 2021 01:06 )      137  |  98  |  28<H>  ----------------------------<  104<H>  4.7   |  26  |  0.49<L>    Ca    10.5      30 Aug 2021 01:06  Phos  3.2     -  Mg     1.9         TPro  9.0<H>  /  Alb  3.4  /  TBili  0.8  /  DBili  x   /  AST  50<H>  /  ALT  116<H>  /  AlkPhos  319<H>                 29.1   Ca    10.9<H>      29 Aug 2021 00:36  Phos  2.6       Mg     2.0         TPro  8.9<H>  /  Alb  3.4  /  TBili  0.6  /  DBili  x   /  AST  57<H>  /  ALT  118<H>  /  AlkPhos  330<H>      Ca    10.9<H>      28 Aug 2021 01:07  Phos  3.2     -  Mg     2.4         TPro  9.2<H>  /  Alb  3.5  /  TBili  0.6  /  DBili  x   /  AST  79<H>  /  ALT  118<H>  /  AlkPhos  327<H>      Ca    11.1<H>      27 Aug 2021 01:26  Phos  5.5       Mg     2.5         TPro  9.2<H>  /  Alb  3.7  /  TBili  0.8  /  DBili  x   /  AST  58<H>  /  ALT  87<H>  /  AlkPhos  312<H>                   Ca    10.7<H>      26 Aug 2021 01:00  Phos  3.6     -  Mg     2.2     -    TPro  9.7<H>  /  Alb  3.6  /  TBili  0.6  /  DBili  x   /  AST  53<H>  /  ALT  68<H>  /  AlkPhos  332<H>        Ca    11.0<H>      25 Aug 2021 00:59  Phos  4.5     08-25  Mg     2.0     08-25                                                                                                     PTT - ( 2021 04:52 )  PTT:45.2 sec LIVER FUNCTIONS - ( 2021 00:42 )  Alb: 3.4 g/dL / Pro: 6.7 g/dL / ALK PHOS: 213 U/L / ALT: 15 U/L / AST: 24 U/L / GGT: x           RADIOLOGY: - Reviewed and analyzed RT Pig tail cathter  , LVAD HM2, CT scan of abdomen reviewed result noted

## 2021-08-30 NOTE — PROGRESS NOTE ADULT - SUBJECTIVE AND OBJECTIVE BOX
Chief Complaint/Follow-up on:     Subjective:    MEDICATIONS  (STANDING):  cefepime   IVPB      cefepime   IVPB 1000 milliGRAM(s) IV Intermittent every 8 hours  heparin   Injectable 5000 Unit(s) SubCutaneous every 8 hours  hydrocortisone sodium succinate Injectable 50 milliGRAM(s) IV Push every 8 hours  insulin lispro (ADMELOG) corrective regimen sliding scale   SubCutaneous every 6 hours  insulin NPH human recombinant 10 Unit(s) SubCutaneous every 6 hours  methimazole 20 milliGRAM(s) Oral <User Schedule>  metoclopramide Injectable 10 milliGRAM(s) IV Push every 8 hours  mirtazapine 7.5 milliGRAM(s) Oral daily  multiple electrolytes Injection Type 1 1000 milliLiter(s) (30 mL/Hr) IV Continuous <Continuous>  multivitamin 1 Tablet(s) Oral daily  pantoprazole  Injectable 40 milliGRAM(s) IV Push every 12 hours  propranolol 40 milliGRAM(s) Oral every 6 hours  sertraline 50 milliGRAM(s) Oral daily  thiamine 100 milliGRAM(s) Oral daily  vancomycin    Solution 125 milliGRAM(s) Oral every 6 hours    MEDICATIONS  (PRN):  ondansetron Injectable 4 milliGRAM(s) IV Push every 6 hours PRN Nausea and/or Vomiting  simethicone 80 milliGRAM(s) Chew every 8 hours PRN Gas      PHYSICAL EXAM:  VITALS: T(C): 35.8 (08-30-21 @ 16:00)  T(F): 96.4 (08-30-21 @ 16:00), Max: 97.1 (08-29-21 @ 20:00)  HR: 109 (08-30-21 @ 17:00) (77 - 121)  BP: --  RR:  (13 - 41)  SpO2:  (96% - 100%)  Wt(kg): --  GENERAL: NAD, well-groomed, well-developed  EYES: No proptosis, no injection  HEENT:  Atraumatic, Normocephalic, moist mucous membranes  THYROID: Normal size, no palpable nodules  RESPIRATORY: Clear to auscultation bilaterally; No rales, rhonchi, wheezing, or rubs  CARDIOVASCULAR: Regular rate and rhythm; No murmurs; no peripheral edema  GI: Soft, nontender, non distended, normal bowel sounds  CUSHING'S SIGNS: no striae    POCT Blood Glucose.: 183 mg/dL (08-30-21 @ 17:21)  POCT Blood Glucose.: 234 mg/dL (08-30-21 @ 11:57)  POCT Blood Glucose.: 160 mg/dL (08-30-21 @ 05:01)  POCT Blood Glucose.: 106 mg/dL (08-30-21 @ 00:43)  POCT Blood Glucose.: 208 mg/dL (08-29-21 @ 17:40)  POCT Blood Glucose.: 188 mg/dL (08-29-21 @ 12:40)  POCT Blood Glucose.: 118 mg/dL (08-29-21 @ 06:10)  POCT Blood Glucose.: 146 mg/dL (08-29-21 @ 00:22)  POCT Blood Glucose.: 199 mg/dL (08-28-21 @ 17:08)  POCT Blood Glucose.: 187 mg/dL (08-28-21 @ 11:40)  POCT Blood Glucose.: 162 mg/dL (08-28-21 @ 06:08)  POCT Blood Glucose.: 181 mg/dL (08-27-21 @ 18:33)    08-30    137  |  98  |  28<H>  ----------------------------<  104<H>  4.7   |  26  |  0.49<L>    EGFR if : 133  EGFR if non : 115    Ca    10.5      08-30  Mg     1.9     08-30  Phos  3.2     08-30    TPro  9.0<H>  /  Alb  3.4  /  TBili  0.8  /  DBili  x   /  AST  50<H>  /  ALT  116<H>  /  AlkPhos  319<H>  08-30          Thyroid Function Tests:  08-30 @ 10:10 TSH -- FreeT4 >7.8 T3 -- Anti TPO -- Anti Thyroglobulin Ab -- TSI --  08-29 @ 04:01 TSH <0.01 FreeT4 -- T3 219 Anti TPO -- Anti Thyroglobulin Ab -- TSI --                           Chief Complaint/Follow-up on: hyperthyroidism and T2D    Subjective: His FT4 is greater than 7.8 in the setting of recent amiodarone use and 2 CT scans with IV contrast on 7/28 and 8/13. It is unclear if he has underlying disease as he has a trache so unable to perform a thyroid exam. TSI ab was negative but TSH rec ab and TPO were not sent. Based on his age, he most likely has Type 1 but the back-to-back IV contrast is the likely culprit.    MEDICATIONS  (STANDING):  cefepime   IVPB      cefepime   IVPB 1000 milliGRAM(s) IV Intermittent every 8 hours  heparin   Injectable 5000 Unit(s) SubCutaneous every 8 hours  hydrocortisone sodium succinate Injectable 50 milliGRAM(s) IV Push every 8 hours  insulin lispro (ADMELOG) corrective regimen sliding scale   SubCutaneous every 6 hours  insulin NPH human recombinant 10 Unit(s) SubCutaneous every 6 hours  methimazole 20 milliGRAM(s) Oral <User Schedule>  metoclopramide Injectable 10 milliGRAM(s) IV Push every 8 hours  mirtazapine 7.5 milliGRAM(s) Oral daily  multiple electrolytes Injection Type 1 1000 milliLiter(s) (30 mL/Hr) IV Continuous <Continuous>  multivitamin 1 Tablet(s) Oral daily  pantoprazole  Injectable 40 milliGRAM(s) IV Push every 12 hours  propranolol 40 milliGRAM(s) Oral every 6 hours  sertraline 50 milliGRAM(s) Oral daily  thiamine 100 milliGRAM(s) Oral daily  vancomycin    Solution 125 milliGRAM(s) Oral every 6 hours    MEDICATIONS  (PRN):  ondansetron Injectable 4 milliGRAM(s) IV Push every 6 hours PRN Nausea and/or Vomiting  simethicone 80 milliGRAM(s) Chew every 8 hours PRN Gas      PHYSICAL EXAM:  VITALS: T(C): 35.8 (08-30-21 @ 16:00)  T(F): 96.4 (08-30-21 @ 16:00), Max: 97.1 (08-29-21 @ 20:00)  HR: 109 (08-30-21 @ 17:00) (77 - 121)  BP: --  RR:  (13 - 41)  SpO2:  (96% - 100%)  Wt(kg): --  GENERAL: NAD, well-groomed, well-developed  EYES: No proptosis, no injection  HEENT:  Atraumatic, Normocephalic, moist mucous membranes, +trach  RESPIRATORY: Clear to auscultation bilaterally; No rales, rhonchi, wheezing, or rubs  CARDIOVASCULAR: Regular rate and rhythm; No murmurs; no peripheral edema  GI: Soft, nontender, non distended, normal bowel sounds      POCT Blood Glucose.: 183 mg/dL (08-30-21 @ 17:21)  POCT Blood Glucose.: 234 mg/dL (08-30-21 @ 11:57)  POCT Blood Glucose.: 160 mg/dL (08-30-21 @ 05:01)  POCT Blood Glucose.: 106 mg/dL (08-30-21 @ 00:43)  POCT Blood Glucose.: 208 mg/dL (08-29-21 @ 17:40)  POCT Blood Glucose.: 188 mg/dL (08-29-21 @ 12:40)  POCT Blood Glucose.: 118 mg/dL (08-29-21 @ 06:10)  POCT Blood Glucose.: 146 mg/dL (08-29-21 @ 00:22)  POCT Blood Glucose.: 199 mg/dL (08-28-21 @ 17:08)  POCT Blood Glucose.: 187 mg/dL (08-28-21 @ 11:40)  POCT Blood Glucose.: 162 mg/dL (08-28-21 @ 06:08)  POCT Blood Glucose.: 181 mg/dL (08-27-21 @ 18:33)    08-30    137  |  98  |  28<H>  ----------------------------<  104<H>  4.7   |  26  |  0.49<L>    EGFR if : 133  EGFR if non : 115    Ca    10.5      08-30  Mg     1.9     08-30  Phos  3.2     08-30    TPro  9.0<H>  /  Alb  3.4  /  TBili  0.8  /  DBili  x   /  AST  50<H>  /  ALT  116<H>  /  AlkPhos  319<H>  08-30          Thyroid Function Tests:  08-30 @ 10:10 FreeT4 >7.8   08-29 @ 04:01 TSH <0.01 T3 219

## 2021-08-30 NOTE — PROGRESS NOTE ADULT - ASSESSMENT
65 year old male with history of Stage D HF due to NICM on LVAD,  TV annuloplasty ring 9/12/17 as destination therapy due to severe peripheral artery disease with significant stenosis  SIADH, Depression, CKD-3 with hyperkalemia consulted for management of hyperthyroidism in the setting of recent amiodarone use and 2 CTs with IV contrast on 7/28 and 8/13. Endocrine consulted for hyperthyroidism.

## 2021-08-31 NOTE — PROGRESS NOTE ADULT - PROBLEM SELECTOR PLAN 1
-Avoid any tests with IV contrast  -Check TPO and TSH rec ab  -Unable to examine due to his trach. Neither CT chest commented on nodules, will need to discuss with radiology.  -patient will thyrotoxicosis, so would adjust his regimen as thus:  MMI 20mg po bid - watch for biliary issues: alk phos/bilirubin elevations, PTU not MMI is associated with direct hepatotoxicity  Increase propanolol to 40mg po q6 as you need a dose of 160mg/day to decrease peripheral conversion of T4 to T3  Adjust steroids to HC 50mg IVP q6  Recheck a FT4 on Thursday 9/2 -Avoid any tests with IV contrast  -Check TPO abd  - TSH rec ab pending  -Unable to examine due to his trach an dpt refusal. Neither CT chest commented on nodules, will need to discuss with radiology.  -Continue regimen as thus:  MMI 20mg po bid - watch for biliary issues: alk phos/bilirubin elevations, PTU not MMI is associated with direct hepatotoxicity  Increase propanolol to 40mg po q6 as tolerated (decreased to 20mg today) as you need a dose of 160mg/day to decrease peripheral conversion of T4 to T3  HC 50mg IVP q6  Recheck a FT4 on Thursday 9/2, hopefully his levels will be better then and his medications can be tapered down

## 2021-08-31 NOTE — PROGRESS NOTE ADULT - SUBJECTIVE AND OBJECTIVE BOX
INFECTIOUS DISEASES FOLLOW UP-- Anitra Prater  [phone]    This is a follow up note for this  66yMale with  Anemia    Acute cholecystitis s/p percutaneous cholecystotomy tube    inability to wean from vent        ROS:  CONSTITUTIONAL:  No fever, withdrawn  CARDIOVASCULAR:  No chest pain or palpitations  RESPIRATORY:  No dyspnea  GASTROINTESTINAL:  No nausea, vomiting, diarrhea, or abdominal pain at rest  GENITOURINARY:  No dysuria  NEUROLOGIC:  No headache,     Allergies    Amiodarone Hydrochloride (Other (Severe))    Intolerances        ANTIBIOTICS/RELEVANT:  antimicrobials    immunologic:    OTHER:  heparin   Injectable 5000 Unit(s) SubCutaneous every 8 hours  hydrocortisone sodium succinate Injectable 50 milliGRAM(s) IV Push every 8 hours  insulin lispro (ADMELOG) corrective regimen sliding scale   SubCutaneous every 6 hours  insulin NPH human recombinant 10 Unit(s) SubCutaneous every 6 hours  methimazole 20 milliGRAM(s) Oral <User Schedule>  metoclopramide Injectable 10 milliGRAM(s) IV Push every 8 hours  mirtazapine 7.5 milliGRAM(s) Oral daily  multiple electrolytes Injection Type 1 1000 milliLiter(s) IV Continuous <Continuous>  multivitamin 1 Tablet(s) Oral daily  ondansetron Injectable 4 milliGRAM(s) IV Push every 6 hours PRN  pantoprazole  Injectable 40 milliGRAM(s) IV Push every 12 hours  propranolol 20 milliGRAM(s) Oral every 6 hours  sertraline 100 milliGRAM(s) Oral daily  simethicone 80 milliGRAM(s) Chew every 8 hours PRN  thiamine 100 milliGRAM(s) Oral daily      Objective:  Vital Signs Last 24 Hrs  T(C): 36.3 (31 Aug 2021 16:00), Max: 36.3 (31 Aug 2021 12:00)  T(F): 97.4 (31 Aug 2021 16:00), Max: 97.4 (31 Aug 2021 16:00)  HR: 118 (31 Aug 2021 18:00) (101 - 126)  BP: --  BP(mean): --  RR: 27 (31 Aug 2021 18:00) (11 - 50)  SpO2: 100% (31 Aug 2021 18:00) (95% - 100%)    PHYSICAL EXAM:  Constitutional:no acute distress, interactive but frail and wants to be left alone  Eyes:ALONSO, EOMI  Ear/Nose/Throat: no oral lesions, trach site no erythema	  Respiratory: audible bilat  Cardiovascular: S1S2 VAD sounds  Gastrointestinal: poalpable VAD line, dawn tube draining bilious fluid  Extremities:no e/e/c  No Lymphadenopathy  IV sites not inflammed.    LABS:                        8.0    14.66 )-----------( 235      ( 31 Aug 2021 00:43 )             26.1     08-31    134<L>  |  97  |  25<H>  ----------------------------<  173<H>  4.5   |  25  |  0.49<L>    Ca    10.3      31 Aug 2021 00:44  Phos  2.6     08-31  Mg     1.8     08-31    TPro  8.3  /  Alb  3.2<L>  /  TBili  0.6  /  DBili  x   /  AST  70<H>  /  ALT  132<H>  /  AlkPhos  313<H>  08-31          MICROBIOLOGY:            RECENT CULTURES:  08-27 @ 14:30  .Body Fluid Cholecystostomy drain  --  --  --    No growth  --  08-27 @ 03:23  .Blood Blood-Peripheral  --  --  --    No growth to date.  --  08-26 @ 22:58  .Blood Blood  --  --  --    No growth to date.  --  08-26 @ 22:56  .Blood Blood  --  --  --    No growth to date.  --      RADIOLOGY & ADDITIONAL STUDIES:    < from: Xray Chest 1 View AP/PA (08.31.21 @ 11:05) >  The heart is enlarged. The lungs appear to be clear. No pleural effusion. No pneumothorax. An NG tube was placed and the tip is in the pylorus. Endotracheal tube is not seen on the current study. A left ventricle assisted device is present. A loop recorder is overlying the left hemithorax. Status post sternotomy.    < end of copied text >

## 2021-08-31 NOTE — PROGRESS NOTE ADULT - ASSESSMENT
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 coli UTIs, driveline drainage (1/7/21) and COVID-19, here initially for GIB prolonged hospital course, s/p tracheostomy, acalculous cholecystitis s/p perc dawn. Patient has persistent intermittent abdominal pain. Per CTU team, pt has recently been refusing tube feeds and is being evaluated for chronic mesenteric ischemia vs SMA syndrome.  8/13 bld cxs positive. Consulted for TPN. Prealb 11, prior a1c 5.6, tg 141. 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 Tolerating feeds but with intermittent abd pain.     -cont tube feeds- Vital 1.5 and advance to goal as tolerated (goal of 65 ml/hr) as per CTU team, no present plan to initiate TPN, will reassess need pending clinical course  -f/u speech and swallow evaluation  -suspect uncontrolled hyperthyroidism contributing to metabolic issues ie. gi motility, hypercalcemia, tachycardia; further management as per endocrine  -h/o abdominal pain/vomiting- as above, zofran prn for nausea; reglan for gut motility  -hypercalcemia- please send vit d/pth levels  -hypophosphatemia/magnesemia- replete as per primary  -anemia- rec iron supp when feasible  -psych, palliative care, and ethics input noted  -management as per CTU; will remain available as needed    plan discussed with Martínez, agreeable with above    TPN pager [phone], spectra [phone]  M-F 6A-4P, Weekends and holidays 8A-12P

## 2021-08-31 NOTE — PROGRESS NOTE ADULT - SUBJECTIVE AND OBJECTIVE BOX
Chief Complaint/Follow-up on:     Subjective:    MEDICATIONS  (STANDING):  heparin   Injectable 5000 Unit(s) SubCutaneous every 8 hours  hydrocortisone sodium succinate Injectable 50 milliGRAM(s) IV Push every 8 hours  insulin lispro (ADMELOG) corrective regimen sliding scale   SubCutaneous every 6 hours  insulin NPH human recombinant 10 Unit(s) SubCutaneous every 6 hours  methimazole 20 milliGRAM(s) Oral <User Schedule>  metoclopramide Injectable 10 milliGRAM(s) IV Push every 8 hours  mirtazapine 7.5 milliGRAM(s) Oral daily  multiple electrolytes Injection Type 1 1000 milliLiter(s) (30 mL/Hr) IV Continuous <Continuous>  multivitamin 1 Tablet(s) Oral daily  pantoprazole  Injectable 40 milliGRAM(s) IV Push every 12 hours  propranolol 20 milliGRAM(s) Oral every 6 hours  sertraline 100 milliGRAM(s) Oral daily  thiamine 100 milliGRAM(s) Oral daily    MEDICATIONS  (PRN):  ondansetron Injectable 4 milliGRAM(s) IV Push every 6 hours PRN Nausea and/or Vomiting  simethicone 80 milliGRAM(s) Chew every 8 hours PRN Gas      PHYSICAL EXAM:  VITALS: T(C): 36.3 (08-31-21 @ 12:00)  T(F): 97.3 (08-31-21 @ 12:00), Max: 97.3 (08-31-21 @ 12:00)  HR: 104 (08-31-21 @ 14:00) (101 - 126)  BP: --  RR:  (11 - 50)  SpO2:  (95% - 100%)  Wt(kg): --  GENERAL: NAD, well-groomed, well-developed  EYES: No proptosis, no injection  HEENT:  Atraumatic, Normocephalic, moist mucous membranes  THYROID: Normal size, no palpable nodules  RESPIRATORY: Clear to auscultation bilaterally; No rales, rhonchi, wheezing, or rubs  CARDIOVASCULAR: Regular rate and rhythm; No murmurs; no peripheral edema  GI: Soft, nontender, non distended, normal bowel sounds  CUSHING'S SIGNS: no striae    POCT Blood Glucose.: 202 mg/dL (08-31-21 @ 12:57)  POCT Blood Glucose.: 178 mg/dL (08-31-21 @ 05:31)  POCT Blood Glucose.: 183 mg/dL (08-30-21 @ 17:21)  POCT Blood Glucose.: 234 mg/dL (08-30-21 @ 11:57)  POCT Blood Glucose.: 160 mg/dL (08-30-21 @ 05:01)  POCT Blood Glucose.: 106 mg/dL (08-30-21 @ 00:43)  POCT Blood Glucose.: 208 mg/dL (08-29-21 @ 17:40)  POCT Blood Glucose.: 188 mg/dL (08-29-21 @ 12:40)  POCT Blood Glucose.: 118 mg/dL (08-29-21 @ 06:10)  POCT Blood Glucose.: 146 mg/dL (08-29-21 @ 00:22)  POCT Blood Glucose.: 199 mg/dL (08-28-21 @ 17:08)    08-31    134<L>  |  97  |  25<H>  ----------------------------<  173<H>  4.5   |  25  |  0.49<L>    EGFR if : 133  EGFR if non : 115    Ca    10.3      08-31  Mg     1.8     08-31  Phos  2.6     08-31    TPro  8.3  /  Alb  3.2<L>  /  TBili  0.6  /  DBili  x   /  AST  70<H>  /  ALT  132<H>  /  AlkPhos  313<H>  08-31          Thyroid Function Tests:  08-31 @ 00:21  Anti TPO 49.9   08-30 @ 10:10  FreeT4 >7.8                            Chief Complaint/Follow-up on: Hyperthyroidism    Subjective: Patient told me to leave and did not want to examine him. Speech pathologist at the bedside attempting to do a swallow evaluation. His bilirubin remains normal and LFTs with a mild increase today. His propanolol dose was lowered due to low HR.     MEDICATIONS  (STANDING):  heparin   Injectable 5000 Unit(s) SubCutaneous every 8 hours  hydrocortisone sodium succinate Injectable 50 milliGRAM(s) IV Push every 8 hours  insulin lispro (ADMELOG) corrective regimen sliding scale   SubCutaneous every 6 hours  insulin NPH human recombinant 10 Unit(s) SubCutaneous every 6 hours  methimazole 20 milliGRAM(s) Oral <User Schedule>  metoclopramide Injectable 10 milliGRAM(s) IV Push every 8 hours  mirtazapine 7.5 milliGRAM(s) Oral daily  multiple electrolytes Injection Type 1 1000 milliLiter(s) (30 mL/Hr) IV Continuous <Continuous>  multivitamin 1 Tablet(s) Oral daily  pantoprazole  Injectable 40 milliGRAM(s) IV Push every 12 hours  propranolol 20 milliGRAM(s) Oral every 6 hours  sertraline 100 milliGRAM(s) Oral daily  thiamine 100 milliGRAM(s) Oral daily    MEDICATIONS  (PRN):  ondansetron Injectable 4 milliGRAM(s) IV Push every 6 hours PRN Nausea and/or Vomiting  simethicone 80 milliGRAM(s) Chew every 8 hours PRN Gas      PHYSICAL EXAM:  VITALS: T(C): 36.3 (08-31-21 @ 12:00)  T(F): 97.3 (08-31-21 @ 12:00), Max: 97.3 (08-31-21 @ 12:00)  HR: 104 (08-31-21 @ 14:00) (101 - 126)  BP: --  RR:  (11 - 50)  SpO2:  (95% - 100%)  Wt(kg): --  GENERAL: NAD, well-groomed, cachetic appearing male  EYES: No proptosis, no injection  HEENT:  Atraumatic, Normocephalic, moist mucous membranes, +trach in place  PSYCH: irritable mood and affect      POCT Blood Glucose.: 202 mg/dL (08-31-21 @ 12:57)  POCT Blood Glucose.: 178 mg/dL (08-31-21 @ 05:31)  POCT Blood Glucose.: 183 mg/dL (08-30-21 @ 17:21)  POCT Blood Glucose.: 234 mg/dL (08-30-21 @ 11:57)  POCT Blood Glucose.: 160 mg/dL (08-30-21 @ 05:01)  POCT Blood Glucose.: 106 mg/dL (08-30-21 @ 00:43)  POCT Blood Glucose.: 208 mg/dL (08-29-21 @ 17:40)  POCT Blood Glucose.: 188 mg/dL (08-29-21 @ 12:40)  POCT Blood Glucose.: 118 mg/dL (08-29-21 @ 06:10)  POCT Blood Glucose.: 146 mg/dL (08-29-21 @ 00:22)  POCT Blood Glucose.: 199 mg/dL (08-28-21 @ 17:08)    08-31    134<L>  |  97  |  25<H>  ----------------------------<  173<H>  4.5   |  25  |  0.49<L>    EGFR if : 133  EGFR if non : 115    Ca    10.3      08-31  Mg     1.8     08-31  Phos  2.6     08-31    TPro  8.3  /  Alb  3.2<L>  /  TBili  0.6  /  DBili  x   /  AST  70<H>  /  ALT  132<H>  /  AlkPhos  313<H>  08-31          Thyroid Function Tests:  08-31 @ 00:21  Anti TPO 49.9   08-30 @ 10:10  FreeT4 >7.8

## 2021-08-31 NOTE — CHART NOTE - NSCHARTNOTEFT_GEN_A_CORE
Pt seen at bedside for speaking valve trial and repeat swallow evaluation. Pt awake and alert, generally cooperative for session. Pt     RECOMMENDATIONS  1) Continue NPO, with non-oral nutrition/hydration/medications.   2) Strict aspiration and reflux precautions for secretions and enteral feeds.  3) Maintain good oral hygiene.   4) Restorative swallow therapy, and PMV trials with SLP only. Will continue to follow while patient is in-house, as schedule permits.   5) d/c tbd pending hospital course Pt seen at bedside for speaking valve trial and repeat swallow evaluation. Pt awake and alert, following directions, generally calm and cooperative for session. Pt with Portex 6 trach, cuff deflated, on FiO2 40% via TC. Pt with baseline VSS notable for RR 30s-40s, O2 sat 95-97, . PMV was placed on hub of trach, with VSS notable for O2 sat 96-99 . RR initially remained unchanged, but with cues to breathe in through nose and out through pursed lip, Pt's rate gradually slowed to the 20s and remained there during subsequent conversation. However, upon the arrival of a clinician he did not want to see, Pt became agitated, and anxious, with RR rising to 40s-50s, HR and O2 sat stable. Given this presentation, suspect a component of anxiety/ agitation influencing RR.     Pt was administered limited PO trials of small ice chips. Pt presents with evidence of an oropharyngeal dysphagia notable for prolonged oral phase, suspected delay in pharyngeal swallow trigger, suspected reduced hyolaryngeal elevation/excursion, with overt s/s laryngeal penetration/aspiration via wet/gurgly vocal quality and delayed cough with difficulty clearing thin clear secretions via cough.  Pt with a few episodes of coughing with wet/gurgly vocal quality noted, with difficulty expectorating secretions. Pt intermittently benefitted from cues to cough and reswallow. Yankauer was used to suction secretions from oral cavity. Eventually, Pt appeared to fatigue with use of PMV and trials of ice chips, so PMV removed after 45 minutes. Purposeful proactive rounding reinforced and 5 Ps addressed. Pt left in no distress.     Given overt s/s laryngeal penetration/aspiration with limited trials of ice chips, and difficulty clearing wet vocal quality, Pt not a candidate for oral feeding at this time.   Pt with gradually improving tolerance of PMV trials. However, given difficulty clearing secretions orally via spontaneous or cued coughs, Pt not a candidate for continuous PMV use at this time.  Will continue to follow for dysphagia therapy and PMV trials. Pt would benefit from additional time and therapy prior to repeat assessment.  Findings d/w Pt, LCAUDIA Abdalla and DILIP Vega.     RECOMMENDATIONS  1) Continue NPO, with non-oral nutrition/hydration/medications.   2) Strict aspiration and reflux precautions for secretions and enteral feeds.  3) Maintain good oral hygiene.   4) Restorative swallow therapy, and PMV trials with SLP only. Will continue to follow while patient is in-house, as schedule permits.   5) d/c tbd pending hospital course.    Gege Hernandez MA, CCC-SLP  Speech-Language Pathologist  pager #[phone]

## 2021-08-31 NOTE — CHART NOTE - NSCHARTNOTEFT_GEN_A_CORE
Nutrition Follow Up Note  Patient seen for: Malnutrition follow-up    Chart reviewed, events noted. 66M, PMH ACC/AHA stage D HF 2/2 NICM HM2 LVAD, TV annuloplasty ring 17 as destination therapy due to severe PAD with significant stenosis  SIADH, Depression, CKD-3, COVID-19, here initially for GIB prolonged hospital course, s/p tracheostomy, acalculous cholecystitis s/p perc dawn. Patient c persistent intermittent abdominal pain. Per Team, pt has recently been refusing tube feeds and is being evaluated for chronic mesenteric ischemia vs SMA syndrome. Tolerating TC     Source: EMR, Team/HF Rounds    Diet, NPO with Tube Feed:   Tube Feeding Modality: Nasogastric  Vital 1.5 Tank (VITAL1.5RTH)  Total Volume for 24 Hours (mL): 1560  Continuous  Starting Tube Feed Rate {mL per Hour}: 10  Increase Tube Feed Rate by (mL): 5     Every 24 hours  Until Goal Tube Feed Rate (mL per Hour): 65  Tube Feed Duration (in Hours): 24  Tube Feed Start Time: 11:45  Supplement Feeding Modality:  Nasogastric  Probiotic Yogurt/Smoothie Cans or Servings Per Day:  2       Frequency:  Daily (21 @ 00:07)    Current EN regimen provides: 1560ml formula, 2340kcals, 105g protein, 1192ml free H2O    EN provisions per chart:  Currently infusing @50ml/hr - slowly advancing to goal per RN  () 41% EN goal volume achieved - EN resumed @2:00  () 46% EN goal volume achieved - Pt pulled out NGT, held @23:00  () 31% EN goal volume achieved   () 31% EN goal volume achieved        Nutrition-related concerns:  - RN notes EN rate was advances this AM to 50ml/hr and pt has been tolerating EN, no further emesis, however has pulled it out a few times in the last week. Pt did have post-pyloric tube placed, however he removed it; RN states it is to be advanced post-pylorically again today.  - Pt previously receiving TwoCal; discussion with Team upon last visit () to switch to Vital 1.5 s/p episode of emesis x2  - Biliary drain in place  - Reglan ordered daily  - Multivitamin and thiamin supplementation ordered daily  - NPH & Insulin Sliding Scale ordered to optimize BG; noted to be receiving Prednisone    GI:  Last BM .   Bowel Regimen? [] Yes   [x] No  - Noted pt on abx course  - Zofran ordered PRN    Weight history:   - Admission weight: 176.3 pounds / 80 kg (6/15)  significant wt loss noted; wt was trending up, now trending back down - to be addressed with EN provisions    Weight in k.4 (), 59 (), 59.9 (), 58.7 (), 63 (), 62.4 (), 66.2 (), 65.9 (), 64.9 (), 62.1 (08-15), 63.6 (), 67.7 (), 71.2 ()    MEDICATIONS  (STANDING):  hydrocortisone sodium succinate Injectable  insulin lispro (ADMELOG) corrective regimen sliding scale  insulin NPH human recombinant  methimazole  multiple electrolytes Injection Type 1  multivitamin  pantoprazole  Injectable  propranolol  thiamine    Pertinent Labs:  @ 00:44: Na 134<L>, BUN 25<H>, Cr 0.49<L>, <H>, K+ 4.5, Phos 2.6, Mg 1.8, Alk Phos 313<H>, ALT/SGPT 132<H>, AST/SGOT 70<H>, HbA1c --    A1C with Estimated Average Glucose Result: 5.4 % (08-15-21 @ 13:52)  A1C with Estimated Average Glucose Result: 5.6 % (06-15-21 @ 02:42)    Finger Sticks:  POCT Blood Glucose.: 178 mg/dL ( @ 05:31)  POCT Blood Glucose.: 183 mg/dL ( @ 17:21)  POCT Blood Glucose.: 234 mg/dL ( @ 11:57)    Triglycerides, Serum: 141 mg/dL (08-15-21 @ 13:53)      Skin per nursing documentation: no pressure injuries   Edema: none at this time     Estimated Nutrition Needs:   Using dosing weight 78.5 kg  Energy Needs (25-30 kcal/kg): 0213-8473 kcal/day  Protein Needs (1.2-1.4 g/kg):     Previous Nutrition Diagnosis: Severe chronic malnutrition  Nutrition diagnosis is ongoing & addressed with tube feeds as tolerated    No new nutrition diagnosis at this time    Recommendations:    1. Recommend advancing EN tube post-pyloric and then continue current EN regimen, increasing as tolerated to Vital 1.5 goal rate of 65ml/hr x 24hrs. At goal to provide 1560ml formula, 2340kcals, 105g protein, 1192ml free H2O (meets ~30kcals/kg & 1.33g protein/kg based on dosing wt 78.5kg).  2. Continue micronutrient supplementation as ordered  3. Adjust Insulin regimen PRN to maintain BG <180mg/dL - defer to team  4. RD to remain available to adjust EN formulary, volume/rate PRN.     Monitoring and Evaluation:   Continue to monitor Nutritional intake, Tolerance to diet prescription, weights, labs, skin integrity  RD remains available upon request and will follow up per protocol     Wendy Travis, MS, RD, CDN, [facility]  pager #[phone]

## 2021-08-31 NOTE — PROGRESS NOTE ADULT - SUBJECTIVE AND OBJECTIVE BOX
[facility] NUTRITION SUPPORT-- FOLLOW UP NOTE  --------------------------------------------------------------------------------  24 hour events/subjective: pt seen and examined. oob in chair. states nausea improved, c/o abd pain, +gi fxn. tolerating tf at 40cc/hr. tachy-  did not receive 2 doses of propranolol this am per mar. slp macario ordered and pending    Diet:  Diet, NPO with Tube Feed:   Tube Feeding Modality: Nasogastric  Vital 1.5 Tank (VITAL1.5RTH)  Total Volume for 24 Hours (mL): 1560  Continuous  Starting Tube Feed Rate mL per Hour: 10  Increase Tube Feed Rate by (mL): 5     Every 24 hours  Until Goal Tube Feed Rate (mL per Hour): 65  Tube Feed Duration (in Hours): 24  Tube Feed Start Time: 11:45  Supplement Feeding Modality:  Nasogastric  Probiotic Yogurt/Smoothie Cans or Servings Per Day:  2       Frequency:  Daily (21 @ 00:07)      PAST HISTORY  --------------------------------------------------------------------------------  No significant changes to PMH, PSH, FHx, SHx, unless otherwise noted    ALLERGIES & MEDICATIONS  --------------------------------------------------------------------------------  Allergies    Amiodarone Hydrochloride (Other (Severe))    Intolerances      Standing Inpatient Medications  heparin   Injectable 5000 Unit(s) SubCutaneous every 8 hours  hydrocortisone sodium succinate Injectable 50 milliGRAM(s) IV Push every 8 hours  insulin lispro (ADMELOG) corrective regimen sliding scale   SubCutaneous every 6 hours  insulin NPH human recombinant 10 Unit(s) SubCutaneous every 6 hours  methimazole 20 milliGRAM(s) Oral <User Schedule>  metoclopramide Injectable 10 milliGRAM(s) IV Push every 8 hours  mirtazapine 7.5 milliGRAM(s) Oral daily  multiple electrolytes Injection Type 1 1000 milliLiter(s) IV Continuous <Continuous>  multivitamin 1 Tablet(s) Oral daily  pantoprazole  Injectable 40 milliGRAM(s) IV Push every 12 hours  propranolol 40 milliGRAM(s) Oral every 6 hours  sertraline 100 milliGRAM(s) Oral daily  thiamine 100 milliGRAM(s) Oral daily    PRN Inpatient Medications  ondansetron Injectable 4 milliGRAM(s) IV Push every 6 hours PRN  simethicone 80 milliGRAM(s) Chew every 8 hours PRN        REVIEW OF SYSTEMS  --------------------------------------------------------------------------------    General:  as per hpi  HEENT:  no headaches, no vision changes, no ear pain  CV:  no chest pain, no palpitations  Resp:  see hpi  GI:  as per hpi  :  no pain, no bleeding, no dysuria  Muscle:  no pain, no weakness  Neuro:  no numbness, no tingling, no memory problems  Psych:  no insomnia  Endocrine:  no cold/heat intolerance  Heme: no ecchymosis, no easy bruising  Skin:  no rash, no edema      VITALS/PHYSICAL EXAM  --------------------------------------------------------------------------------  T(C): 36.2 (21 @ 08:00), Max: 36.2 (21 @ 08:00)  HR: 122 (21 @ 08:00) (90 - 126)  BP: --  RR: 22 (21 @ 08:00) (13 - 50)  SpO2: 99% (21 @ 08:00) (95% - 100%)  Wt(kg): --      21 @ 07:01  -  21 @ 07:00  --------------------------------------------------------  IN: 1675 mL / OUT: 626 mL / NET: 1049 mL    21 @ 07:01  -  21 @ 08:56  --------------------------------------------------------  IN: 190 mL / OUT: 0 mL / NET: 190 mL      I&O's Detail    30 Aug 2021 07:01  -  31 Aug 2021 07:00  --------------------------------------------------------  IN:    Enteral Tube Flush: 50 mL    IV PiggyBack: 150 mL    Miscellaneous Tube Feedin mL    multiple electrolytes Injection Type 1.: 720 mL  Total IN: 1675 mL    OUT:    Drain (mL): 300 mL    Voided (mL): 326 mL  Total OUT: 626 mL    Total NET: 1049 mL      31 Aug 2021 07:  -  31 Aug 2021 08:56  --------------------------------------------------------  IN:    Enteral Tube Flush: 40 mL    Miscellaneous Tube Feedin mL    multiple electrolytes Injection Type 1.: 60 mL  Total IN: 190 mL    OUT:  Total OUT: 0 mL    Total NET: 190 mL          Physical Exam:  Gen: oob in chair in nad  HEENT: +trach +ngt   Chest: respirations non labored  Abd: soft generalized ttp nd +perc c-tube  LE: no edema  Neuro: awake alert responsive      LABS/STUDIES  --------------------------------------------------------------------------------              8.0    14.66 >-----------<  235      [21 @ 00:43]              26.1     134  |  97  |  25  ----------------------------<  173      [21 @ 00:44]  4.5   |  25  |  0.49        Ca     10.3     [21 @ 00:44]      Mg     1.8     [21 @ 00:44]      Phos  2.6     [21 @ 00:44]    TPro  8.3  /  Alb  3.2  /  TBili  0.6  /  DBili  x   /  AST  70  /  ALT  132  /  AlkPhos  313  [21 @ 00:44]              [21 @ 00:44]    Ca ionizedBlood Gas Arterial - Calcium, Ionized: 1.37 mmol/L (21 @ 00:42)    Creatinine Trend:  POC glucoseGlucose, Serum: 173 mg/dL (21 @ 00:44)  CAPILLARY BLOOD GLUCOSE      POCT Blood Glucose.: 178 mg/dL (31 Aug 2021 05:31)  POCT Blood Glucose.: 183 mg/dL (30 Aug 2021 17:21)  POCT Blood Glucose.: 234 mg/dL (30 Aug 2021 11:57)    PrealbuminPrealbumin, Serum: 11 mg/dL (21 @ 04:01)  Prealbumin, Serum: 10 mg/dL (08-15-21 @ 13:53)    Triglycerides     [facility] NUTRITION SUPPORT-- FOLLOW UP NOTE  --------------------------------------------------------------------------------  24 hour events/subjective: pt seen and examined. oob in chair. states nausea improved, c/o abd pain, +gi fxn. tolerating tf at 40cc/hr. tachy-  did not receive 2 doses of propranolol this am per mar. slp macario ordered and pending. med changes noted    Diet:  Diet, NPO with Tube Feed:   Tube Feeding Modality: Nasogastric  Vital 1.5 Tank (VITAL1.5RTH)  Total Volume for 24 Hours (mL): 1560  Continuous  Starting Tube Feed Rate mL per Hour: 10  Increase Tube Feed Rate by (mL): 5     Every 24 hours  Until Goal Tube Feed Rate (mL per Hour): 65  Tube Feed Duration (in Hours): 24  Tube Feed Start Time: 11:45  Supplement Feeding Modality:  Nasogastric  Probiotic Yogurt/Smoothie Cans or Servings Per Day:  2       Frequency:  Daily (21 @ 00:07)      PAST HISTORY  --------------------------------------------------------------------------------  No significant changes to PMH, PSH, FHx, SHx, unless otherwise noted    ALLERGIES & MEDICATIONS  --------------------------------------------------------------------------------  Allergies    Amiodarone Hydrochloride (Other (Severe))    Intolerances      Standing Inpatient Medications  heparin   Injectable 5000 Unit(s) SubCutaneous every 8 hours  hydrocortisone sodium succinate Injectable 50 milliGRAM(s) IV Push every 8 hours  insulin lispro (ADMELOG) corrective regimen sliding scale   SubCutaneous every 6 hours  insulin NPH human recombinant 10 Unit(s) SubCutaneous every 6 hours  methimazole 20 milliGRAM(s) Oral <User Schedule>  metoclopramide Injectable 10 milliGRAM(s) IV Push every 8 hours  mirtazapine 7.5 milliGRAM(s) Oral daily  multiple electrolytes Injection Type 1 1000 milliLiter(s) IV Continuous <Continuous>  multivitamin 1 Tablet(s) Oral daily  pantoprazole  Injectable 40 milliGRAM(s) IV Push every 12 hours  propranolol 40 milliGRAM(s) Oral every 6 hours  sertraline 100 milliGRAM(s) Oral daily  thiamine 100 milliGRAM(s) Oral daily    PRN Inpatient Medications  ondansetron Injectable 4 milliGRAM(s) IV Push every 6 hours PRN  simethicone 80 milliGRAM(s) Chew every 8 hours PRN        REVIEW OF SYSTEMS  --------------------------------------------------------------------------------    General:  as per hpi  HEENT:  no headaches, no vision changes, no ear pain  CV:  no chest pain, no palpitations  Resp:  see hpi  GI:  as per hpi  :  no pain, no bleeding, no dysuria  Muscle:  no pain, no weakness  Neuro:  no numbness, no tingling, no memory problems  Psych:  no insomnia  Endocrine:  no cold/heat intolerance  Heme: no ecchymosis, no easy bruising  Skin:  no rash, no edema      VITALS/PHYSICAL EXAM  --------------------------------------------------------------------------------  T(C): 36.2 (21 @ 08:00), Max: 36.2 (21 @ 08:00)  HR: 122 (21 @ 08:00) (90 - 126)  BP: --  RR: 22 (21 @ 08:00) (13 - 50)  SpO2: 99% (21 @ 08:00) (95% - 100%)  Wt(kg): --      21 @ 07:01  -  21 @ 07:00  --------------------------------------------------------  IN: 1675 mL / OUT: 626 mL / NET: 1049 mL    21 @ 07:  -  21 @ 08:56  --------------------------------------------------------  IN: 190 mL / OUT: 0 mL / NET: 190 mL      I&O's Detail    30 Aug 2021 07:01  -  31 Aug 2021 07:00  --------------------------------------------------------  IN:    Enteral Tube Flush: 50 mL    IV PiggyBack: 150 mL    Miscellaneous Tube Feedin mL    multiple electrolytes Injection Type 1.: 720 mL  Total IN: 1675 mL    OUT:    Drain (mL): 300 mL    Voided (mL): 326 mL  Total OUT: 626 mL    Total NET: 1049 mL      31 Aug 2021 07:  -  31 Aug 2021 08:56  --------------------------------------------------------  IN:    Enteral Tube Flush: 40 mL    Miscellaneous Tube Feedin mL    multiple electrolytes Injection Type 1.: 60 mL  Total IN: 190 mL    OUT:  Total OUT: 0 mL    Total NET: 190 mL          Physical Exam:  Gen: oob in chair in nad  HEENT: +trach +ngt   Chest: respirations non labored  Abd: soft generalized ttp nd +perc c-tube  LE: no edema  Neuro: awake alert responsive      LABS/STUDIES  --------------------------------------------------------------------------------              8.0    14.66 >-----------<  235      [21 @ 00:43]              26.1     134  |  97  |  25  ----------------------------<  173      [21 @ 00:44]  4.5   |  25  |  0.49        Ca     10.3     [21 @ 00:44]      Mg     1.8     [21 @ 00:44]      Phos  2.6     [21 00:44]    TPro  8.3  /  Alb  3.2  /  TBili  0.6  /  DBili  x   /  AST  70  /  ALT  132  /  AlkPhos  313  [21 @ 00:44]              [21 @ 00:44]    Ca ionizedBlood Gas Arterial - Calcium, Ionized: 1.37 mmol/L (21 @ 00:42)    Creatinine Trend:  POC glucoseGlucose, Serum: 173 mg/dL (21 @ 00:44)  CAPILLARY BLOOD GLUCOSE      POCT Blood Glucose.: 178 mg/dL (31 Aug 2021 05:31)  POCT Blood Glucose.: 183 mg/dL (30 Aug 2021 17:21)  POCT Blood Glucose.: 234 mg/dL (30 Aug 2021 11:57)    PrealbuminPrealbumin, Serum: 11 mg/dL (21 @ 04:01)  Prealbumin, Serum: 10 mg/dL (08-15-21 @ 13:53)    Triglycerides

## 2021-08-31 NOTE — PROGRESS NOTE ADULT - PROBLEM SELECTOR PLAN 2
-HbA1c 5.4%  -Increase NPH to 10 units sq q6  -continue moderate scale sq q6  -BS testing q6  -Discussed with ELVIE Beatty MD  Endocrinology Attending  Mondays and Tuesdays 9am-6pm: [phone] (pager)  Other days, night and weekend: [phone] -HbA1c 5.4%  -Increase NPH to 12 units sq at 6am and continue 10 units at 12am, 12pm, 6pm.  -continue moderate scale sq q6  -BS testing q6  -Discussed with ELVIE Beatty MD  Endocrinology Attending  Mondays and Tuesdays 9am-6pm: [phone] (pager)  Other days, night and weekend: [phone]

## 2021-08-31 NOTE — PROGRESS NOTE ADULT - SUBJECTIVE AND OBJECTIVE BOX
RICKY JOINT  MRN#: [id number]  Subjective:  Pulmonary progress  : recurrent Acute hypoxic respiratory Failure ,aspiration pneumonia, NICM  , chart reviewed and H/O obtained radiological and Laboratory study reviewed patient Examined     65M PMH ACC/AHA stage D HF due to NICM HM2 LVAD , TV annuloplasty ring 17 as destination therapy due to severe peripheral artery disease with significant stenosis  SIADH, Depression, CKD-3 with hyperkalemia, past E. coli UTIs, driveline drainage (21) and COVID-19 (back in 2020)  He was recently seen in clinic where he complained of abdominal pain and dark stools w constipation back in May. He presents to [facility] ER today weakness and fatigue, moderate and + Black stools for three days, on coumadin secondary to warfarin use in the setting of an LVAD. Patient has required transfusions for GIB in the past. Mostly recently back in 2021 pt had anemia with dark stools. No interventions was done at that time. However Last Endoscopy was done in 2020 (negative). Today labs show patient is anemic with H/H of 4.5/16.3,. INR is 8.84 MAP in the 90s, Temp 35.1. He denies any chest pain, shortness of breath, dizziness, abd pain, nausea or vomiting. found to have  rectal bleeding underwent endoscopy ,old blood in the proximal ileum ,  develop sepsis with LL opacity given Antibiotics , Extubated , reintubated , Bronchoscopy on Zosyn for LL pneumonia  and Amiodarone S/P TV Annuloplasty , patient remain intubated on full ventilatory support .S/P multiple units of blood transfusion , remain on full ventilatory support on Precedex and propofol , new central IJ line , diarrhea C diff. +ve on po vancomycin and IV Flagyl,  mildly distended belly , fever start on cefepime 2gm q 8 hrs S/P tracheostomy .  new RT Subclavian central line continue on contact  isolation ,still diarrhea on C-diff antibiotics ENT follow up appreciated , trial of C-PAP as tolerated , , copious secretion from trach. site chest x ray left lower lobe pneumonia , tolerating trch. collar 50% FI02 still excessive secretion need pulmonary toilet , off Ancef antibiotic , no more diarrhea back on full support mechanical ventilator , chest x ray show improvement in LLL air space disease, more awake and responsive on tube feeding no more diarrhea , S/P  Ancef for bacteremia no fever ,ID follow up noted ,  no nausea or vomiting or diarrhea still very weak and tired , event note pulled NG tube now replaced , back on tube feeding ,still on po vancomycin , getting PT and OT at bed side , no plan for decannulation for now. , no more diarrhea receiving PT and OPT at bed side , minimal secretion from tracheostomy site , no SOB getting stronger , improve muscle tone patient transfer to monitor bed still on contact isolation for C-Difficel colitis on 50% FI02, NG tube clogged , and change to resume tube feeding still loose stool . H/H drop significantly require blood transfusion , most likely GI bleeding , IV heparin D/C , vomiting 200 cc of creamy color tube feeding on hold no sob, still melena monitor in the CTU possible capsule endoscopy , H/H is stable ., patient develop TR sided  pneumothorax require chest tube placement , RT IJ central line  placed , develop fever shaking chills , blood culture positive for serratia marcescens , start on cefepime .the patient  become hypoxic and hypotensive placed on full ventilatory support and Vasopressin , levophed and Dobutamine ,S/P blood transfusion on meropenem and vancomycin ,   , on and  off pressors , occasional agitation on Precedex .S/P IR cholecystostomy tube drainage placement in the RT upper Quadrant , resume anticoagulation chest x ray noted C-PAP trail lasted only for 2 hrs , new RT SC line and D/C RT IJ line , RT pig tail cathter has been removed , tolerating C-PAP trial placed on trach. collar 50% FI02 GI consultant noted on NG tube feeding as tolerated , develop AF RVR S/P  bolus Amiodarone  back to regular sinus Rhythm , Flat Affect depressed , back on tube feeding Vital AF at 60 cc/ hr .still intermittent abdominal pain , no fever saturation is accepted  back on full ventilatory support , tired and sleepy on round  .start  back on  tube feeding . tolerating it very well , no nausea or vomiting , good 02 saturation on trac-collar on methimazole for hyperthyroidism , no new C/O no plan for decannulation yet , electrolyte blood test is still pending .on respiratory isolation sputum culture is negative , increase WBC  improved on cefepime , sputum positive for enterococcus , condition is the same , white count is improving , no chest pain or SOB , speech and swalloe evaluation appreciated        (2021 16:57)    PAST MEDICAL & SURGICAL HISTORY:  CHF (congestive heart failure)    CAD (coronary artery disease)  Depression    Pleural effusion    History of 2019 novel coronavirus disease (COVID-19)  2020    Hemorrhoids    Bleeding hemorrhoids    Peripheral arterial disease    Claudication    BPH with urinary obstruction    ACC/AHA stage D systolic heart failure  Anticoagulation goal of INR 2.0 to 2.5    Falls    Clavicle fracture    CKD (chronic kidney disease), stage III    Iron deficiency anemia    H/O epistaxis    Vertigo    GI bleed    S/P TVR (tricuspid valve repair)    S/P ventricular assist device    S/P endoscopy    OBJECTIVE:  ICU Vital Signs Last 24 Hrs  T(C): 38.2 (2021 10:00), Max: 38.5 (2021 12:00)  T(F): 100.8 (2021 10:00), Max: 101.3 (2021 12:00)  HR: 65 (2021 10:00) (61 - 69)  BP: --  BP(mean): --  ABP: 105/67 (2021 10:00) (90/54 - 113/64)  ABP(mean): 77 (2021 10:00) (63 - 77)  RR: 20 (2021 10:00) (19 - 35)  SpO2: 99% (2021 10:00) (96% - 100%)       @ 07: @ 07:00  --------------------------------------------------------  IN: 2693.9 mL / OUT: 1415 mL / NET: 1278.9 mL     @ 07: @ 10:49  --------------------------------------------------------  IN: 420.8 mL / OUT: 115 mL / NET: 305.8 mL  PHYSICAL EXAM:Daily   Elderly male S/P tracheostomy on trach. collar 50% FI02 ,  Daily Weight in k.4 (2021 00:00)  HEENT:     + NCAT  + EOMI  - Conjuctival edema   - Icterus   - Thrush   - ETT  + NGT/OGT  Neck:         + FROM  RT IJ  line JVD  - Nodes - Masses + Mid-line trachea + Tracheostomy  Chest:            normal A-P diameter    Lungs:          + CTA   + Rhonchi    - Rales    - Wheezing + Decreased  LT BS   - Dullness R L  Cardiac:       + S1 + S2    + RRR   - Irregular   - S3  - S4    - Murmurs   - Rub   - Hamman’s sign   Abdomen:    + BS  + Soft + Non-tender - Distended - Organomegaly - PEG .cholecystostomy tube in place  Extremities:   - Cyanosis U/L   - Clubbing  U/L  + LE/UE Edema   + Capillary refill    + Pulses   Neuro:        - Awake   -  Alert   - Confused   - Lethargic   + Sedated  + Generalized Weakness  Skin:        - Rashes    - Erythema   + Normal incisions   + IV sites intact          HOSPITAL MEDICATIONS: All medications reviewed and analyzed  MEDICATIONS  (STANDING):  amiodarone    Tablet 200 milliGRAM(s) Oral daily  chlorhexidine 0.12% Liquid 15 milliLiter(s) Oral Mucosa every 12 hours  chlorhexidine 2% Cloths 1 Application(s) Topical <User Schedule>  dexmedetomidine Infusion 0.5 MICROgram(s)/kG/Hr (9.81 mL/Hr) IV Continuous <Continuous>  dextrose 50% Injectable 50 milliLiter(s) IV Push every 15 minutes  heparin  Infusion 400 Unit(s)/Hr (12.5 mL/Hr) IV Continuous <Continuous>  Hydromorphone  Injectable 0.5 milliGRAM(s) IV Push once  insulin lispro (ADMELOG) corrective regimen sliding scale   SubCutaneous every 6 hours  pantoprazole  Injectable 40 milliGRAM(s) IV Push every 12 hours  piperacillin/tazobactam IVPB.. 3.375 Gram(s) IV Intermittent every 8 hours  propofol Infusion 20 MICROgram(s)/kG/Min (9.42 mL/Hr) IV Continuous <Continuous>  sodium chloride 0.9% lock flush 3 milliLiter(s) IV Push every 8 hours  sodium chloride 0.9%. 1000 milliLiter(s) (10 mL/Hr) IV Continuous <Continuous>    MEDICATIONS  (PRN):  acetaminophen    Suspension .. 650 milliGRAM(s) Oral every 6 hours PRN Temp greater or equal to 38C (100.4F)    LABS: All Lab data reviewed and analyzed                        8.0    14.66 )-----------( 235      ( 31 Aug 2021 00:43 )             26.1               -    134<L>  |  97  |  25<H>  ----------------------------<  173<H>  4.5   |  25  |  0.49<L>    Ca    10.3      31 Aug 2021 00:44  Phos  2.6     -  Mg     1.8         TPro  8.3  /  Alb  3.2<L>  /  TBili  0.6  /  DBili  x   /  AST  70<H>  /  ALT  132<H>  /  AlkPhos  313<H>        Ca    10.5      30 Aug 2021 01:06  Phos  3.2     -  Mg     1.9         TPro  9.0<H>  /  Alb  3.4  /  TBili  0.8  /  DBili  x   /  AST  50<H>  /  ALT  116<H>  /  AlkPhos  319<H>                 29.1   Ca    10.9<H>      29 Aug 2021 00:36  Phos  2.6     -  Mg     2.0         TPro  8.9<H>  /  Alb  3.4  /  TBili  0.6  /  DBili  x   /  AST  57<H>  /  ALT  118<H>  /  AlkPhos  330<H>      Ca    10.9<H>      28 Aug 2021 01:07  Phos  3.2     -  Mg     2.4         TPro  9.2<H>  /  Alb  3.5  /  TBili  0.6  /  DBili  x   /  AST  79<H>  /  ALT  118<H>  /  AlkPhos  327<H>      Ca    11.1<H>      27 Aug 2021 01:26  Phos  5.5     -  Mg     2.5         TPro  9.2<H>  /  Alb  3.7  /  TBili  0.8  /  DBili  x   /  AST  58<H>  /  ALT  87<H>  /  AlkPhos  312<H>                   Ca    10.7<H>      26 Aug 2021 01:00  Phos  3.6     08-  Mg     2.2         TPro  9.7<H>  /  Alb  3.6  /  TBili  0.6  /  DBili  x   /  AST  53<H>  /  ALT  68<H>  /  AlkPhos  332<H>        Ca    11.0<H>      25 Aug 2021 00:59  Phos  4.5       Mg     2.0                                                                                                          PTT - ( 2021 04:52 )  PTT:45.2 sec LIVER FUNCTIONS - ( 2021 00:42 )  Alb: 3.4 g/dL / Pro: 6.7 g/dL / ALK PHOS: 213 U/L / ALT: 15 U/L / AST: 24 U/L / GGT: x           RADIOLOGY: - Reviewed and analyzed RT Pig tail cathter  , LVAD HM2, CT scan of abdomen reviewed result noted

## 2021-08-31 NOTE — PROGRESS NOTE ADULT - ASSESSMENT
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 coli UTIs, driveline drainage (1/7/21) and COVID-19 (back in April 2020)  He was recently seen in clinic where he complained of abdominal pain and dark stools w constipation back in May. He presents to [facility] ER today weakness and fatigue, moderate and + Black stools for three days, on coumadin secondary to warfarin use in the setting of an LVAD. Patient has required transfusions for GIB in the past. Mostly recently back in jan 2021 pt had anemia with dark stools. No interventions was done at that time. However Last Endoscopy was done in July 2020 (negative). Today labs show patient is anemic with H/H of 4.5/16.3,. INR is 8.84 MAP in the 90s,   Returned from endoscopy appearing ill tachypneic with chills with new white out of his left lung    Remains with complaints of abdominal pain  afebrile and appears stable  WBC is decreasing to 15k range  Would continue to observe off antimicrobials unless condition changes        Morris Prater MD  [phone]  After 5pm/weekends [phone]

## 2021-08-31 NOTE — PROGRESS NOTE ADULT - SUBJECTIVE AND OBJECTIVE BOX
RICKY HAMPTON  MRN-[id number]  Patient is a 66y old  Male who presents with a chief complaint of Anemia, Supratherapeutic INR, Dark Stools (31 Aug 2021 08:56)    HPI:  65M PMH ACC/AHA stage D HF due to NICM HM2 LVAD , TV annuloplasty ring 17 as destination therapy due to severe peripheral artery disease with significant stenosis  SIADH, Depression, CKD-3 with hyperkalemia, past E. coli UTIs, driveline drainage (21) and COVID-19 (back in 2020)  He was recently seen in clinic where he complained of abdominal pain and dark stools w constipation back in May. He presents to [facility] ER today weakness and fatigue, moderate and + Black stools for three days, on coumadin secondary to warfarin use in the setting of an LVAD. Patient has required transfusions for GIB in the past. Mostly recently back in 2021 pt had anemia with dark stools. No interventions was done at that time. However Last Endoscopy was done in 2020 (negative). Today labs show patient is anemic with H/H of 4.5/16.3,. INR is 8.84 MAP in the 90s, Temp 35.1. He denies any chest pain, shortness of breath, dizziness, abd pain, nausea or vomiting.       (2021 16:57)      Surgery/Hospital Course:   admit for melena w/ anemia, INR 8.84   6/15 Capsul study (+) for small bowel bleed, balloon endoscopy (old blood in prox ileum); post EGD - septic w/ L opacity, re-intubated for concern for aspiration, TTE (Mod MR, decrease biV w/ interventricular septum boweing towards R)   bronch    +C Diff    TC    CT C/A/P: Fluid filled colon which may be 2/2 rapid transit. Small bilateral pleffs with associates. Compressive atelectasis New ISABELLE & LLL  parenchymal opacities, suspicious for pneumonia. 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    #8 Shiley trach at bedside    CPAP trials    LVAD speed increased to 9200   Bronch; Central line dc'ed   TC since , continue as tolerated. Patient transferred to SDU.    Cont PT, no trach downsize today(#8cuffed)/ Anticoagulation/ Continue TF, speech f/u/ Vanco taper    oob to chair  INR  2.47  5 mg coumadin     increased lop to 25 q8  per HF   INR today 2.64.  H&H 7.3 this AM.  Will repeat CBC at noon, and will send stool guaiac Patient with persistent abdominal tenderness, rate of tube feeds decreased.  No nausea/vomiting.     INR today 2.4H&H 9.1.6 low flow overnight /N&V  NPO resolved for capsule study  Coumadin on hold refusing Tube feeds on D5 1/2 normal  @50 cc/hr   INR 2.69  H&H 7..1 refusing Tube feeds on D5 1/2 normal  @50 cc/hr. This am + BM Anusha Oneill HF  aware- PRBC x1  GI team consulted -  NPO  plan on study in am-  D/w Dr Cadet Patient  to return to CTU for further management; 1PRBCS    Post op INR 2.2 today.  No bleeding. BC + for SM.  Pt is hypotensive requiring pressor and inotropic support.  ID follow up today on Cefepime will follow.   R PTC for PTX    CT C/A/P: sub q emphysema in R chest wall, GGO RUL, small ascites CTH negative; Abd US: GB thickening, pericholecystic fluid     Perchole drain in place continues to drain total output overnight 133.  Fever today 38.8 given tylenol.  Will repeat BC now.     duplex LE negative    Patient with persistent abdominal pain, refusing tube feeds and medications, Psych consulted   CTA A/P ordered to r/o mesenteric ischemia 2/2 persistent anorexia, nausea, vomiting. Revealed:  Evaluation of the mesenteric vessels is limited by streak artifact from LVAD. There appears to be severe stenosis of the proximal SMA; abdominal mesenteric doppler is recommended for further evaluation. 2.  No small bowel findings to suggest acute mesenteric ischemia. 3.  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  8/15: Cultures resulted BC 1/2 +GPC in clusters, SC enterobacter; mesenteric duplex: borderline stenosis of proximal SMA   : CT C/A/P noncon: Nondular opacities in R lung apex w/cavitation, abd nl     Today:   D/c'd cefepime and vancomycin this AM. Now increasing tube feeds 10cc/day as tolerated for a goal of 65cc/hr. ?Possible S&S evaluation.     REVIEW OF SYSTEMS:  Patient speaks over trach   Gen: No fever  EYES/ENT: No visual changes;  No vertigo or throat pain   NECK: No pain   RES:  No shortness of breath or Cough  CARD: No chest pain   GI: No abdominal pain  : No dysuria  NEURO: No weakness  SKIN: No itching, rashes     ICU Vital Signs Last 24 Hrs  T(C): 36.2 (31 Aug 2021 08:00), Max: 36.2 (31 Aug 2021 08:00)  T(F): 97.2 (31 Aug 2021 08:00), Max: 97.2 (31 Aug 2021 08:00)  HR: 118 (31 Aug 2021 10:00) (90 - 126)  BP: --  BP(mean): --  ABP: 78/69 (31 Aug 2021 10:00) (75/61 - 137/114)  ABP(mean): 75 (31 Aug 2021 10:00) (68 - 125)  RR: 26 (31 Aug 2021 10:00) (13 - 50)  SpO2: 100% (31 Aug 2021 10:00) (95% - 100%)      Physical Exam:  Gen:  Awake and alert, Sitting in chair in NAD.  Psych: Mood greatly improved, more interactive and participating in care. Would like to be more included in bedside rounding.  CNS:  intact, nonfocal, responds to all commands  Neck: no JVD, +TC   RES : course breath sounds, no wheezing              CVS: +LVAD hum   Abd: Soft, RUQ tenderness by perc sybil site. Sybil drain with bilious fluid. Positive BS throughout.  Skin: No rash  Ext:  no edema    ============================I/O===========================   I&O's Detail    30 Aug 2021 07:01  -  31 Aug 2021 07:00  --------------------------------------------------------  IN:    Enteral Tube Flush: 50 mL    IV PiggyBack: 150 mL    Miscellaneous Tube Feedin mL    multiple electrolytes Injection Type 1.: 720 mL  Total IN: 1675 mL    OUT:    Drain (mL): 300 mL    Voided (mL): 326 mL  Total OUT: 626 mL    Total NET: 1049 mL      31 Aug 2021 07:01  -  31 Aug 2021 10:18  --------------------------------------------------------  IN:    Enteral Tube Flush: 40 mL    Miscellaneous Tube Feedin mL    multiple electrolytes Injection Type 1.: 90 mL  Total IN: 270 mL    OUT:  Total OUT: 0 mL    Total NET: 270 mL        ============================ LABS =========================                        8.0    14.66 )-----------( 235      ( 31 Aug 2021 00:43 )             26.1         134<L>  |  97  |  25<H>  ----------------------------<  173<H>  4.5   |  25  |  0.49<L>    Ca    10.3      31 Aug 2021 00:44  Phos  2.6       Mg     1.8         TPro  8.3  /  Alb  3.2<L>  /  TBili  0.6  /  DBili  x   /  AST  70<H>  /  ALT  132<H>  /  AlkPhos  313<H>      LIVER FUNCTIONS - ( 31 Aug 2021 00:44 )  Alb: 3.2 g/dL / Pro: 8.3 g/dL / ALK PHOS: 313 U/L / ALT: 132 U/L / AST: 70 U/L / GGT: x             ABG - ( 31 Aug 2021 00:42 )  pH, Arterial: 7.41  pH, Blood: x     /  pCO2: 51    /  pO2: 134   / HCO3: 32    / Base Excess: 6.8   /  SaO2: 99.8                ======================Micro/Rad/Cardio=================  Culture: Reviewed   CXR: Reviewed  Echo:Reviewed  ======================================================  PAST MEDICAL & SURGICAL HISTORY:  CHF (congestive heart failure)    CAD (coronary artery disease)    Depression    Pleural effusion    History of 2019 novel coronavirus disease (COVID-19)  2020    Hemorrhoids    Bleeding hemorrhoids    Peripheral arterial disease    Claudication    BPH with urinary obstruction    ACC/AHA stage D systolic heart failure    Anticoagulation goal of INR 2.0 to 2.5    Falls    Clavicle fracture    CKD (chronic kidney disease), stage III    Iron deficiency anemia    H/O epistaxis    Vertigo    GI bleed    S/P TVR (tricuspid valve repair)    S/P ventricular assist device    S/P endoscopy      ====================ASSESSMENT ==============  Stage D Nonischemic Cardiomyopathy, Status Post HM2 on 2017    Cardiogenic shock  Hemodynamic instability   Acute hypoxemic respiratory failure  GI bleed , Status Post Enteroscopy   Anemia, in setting of melena   Chronic Kidney Disease  Stress hyperglycemia   C.diff positive on    Hypovolemic shock  Septic shock  Leukocytosis    Plan:  ====================== NEUROLOGY=====================  Nonfocal, continue to monitor neuro status   C/w mirtazapine and sertraline for depression  PT/Ambulate as tolerated    mirtazapine 7.5 milliGRAM(s) Oral daily  sertraline 100 milliGRAM(s) Oral daily    ==================== RESPIRATORY======================  Acute hypoxemic respiratory failure s/p #8 shiley trach on , continue TC as tolerated (TC since )  Continue close monitoring of respiratory rate and breathing pattern, following of ABG’s with A-line monitoring, continuous pulse oximetry monitoring.   Moderate secretions, continue suctioning prn    ====================CARDIOVASCULAR==================  Stage D Nonischemic Cardiomyopathy, Status Post HM2 on 2017; LVAD settings 9200, flow 6  TTE : reveals at least moderate MR, mild AI, severe global LV syst dysfxn, dec RV fxn   Continue invasive hemodynamic monitoring   Propranolol for rate control of HR 2/2 Hyperthyroidism, titrate as tolerated    propranolol 40 milliGRAM(s) Oral every 6 hours    ===================HEMATOLOGIC/ONC ===================  Acute blood loss anemia, monitor H&H/Plts    Continue monitor LDH daily     VTE prophylaxis, heparin   Injectable 5000 Unit(s) SubCutaneous every 8 hours    ===================== RENAL =========================  Continue monitoring urine output, I&OS, BUN/Cr   Replete lytes PRN. Keep K> 4 and Mg >2      ==================== GASTROINTESTINAL===================  S/p cholecystostomy tube placed on : with IR with -60cc of black bile, will monitor site closely.   Recent emesis on  in setting of abdominal pain, as per GI likely component of illeus given critical illness   CTA A/P : Evaluation of the mesenteric vessels is limited by streak artifact from LVAD. There appears to be severe stenosis of the proximal SMA; abdominal mesenteric doppler is recommended for further evaluation. 2.  No small bowel findings to suggest acute mesenteric ischemia. 3.  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  Mesenteric duplex on 8/15 borderline stenosis of proximal SMA   Tolerating tube feeds, Vital 1.5 shantelle @ 40cc/hr, now increasing tube feeds 10cc/day as tolerated for a goal of 65cc/hr; ?Possible S&S evaluation.  If emesis persists, will discuss ?intervention for SMA stenosis? with vascular. CT Abd/Pel on  without acute process   Reglan for gut motility  Zofran PRN nausea/vomiting      multiple electrolytes Injection Type 1 1000 milliLiter(s) (30 mL/Hr) IV Continuous <Continuous>  multivitamin 1 Tablet(s) Oral daily  GI prophylaxis, pantoprazole  Injectable 40 milliGRAM(s) IV Push every 12 hours  simethicone 80 milliGRAM(s) Chew every 8 hours PRN Gas  thiamine 100 milliGRAM(s) Oral daily  ondansetron Injectable 4 milliGRAM(s) IV Push every 6 hours PRN Nausea and/or Vomiting  metoclopramide Injectable 10 milliGRAM(s) IV Push every 8 hours    =======================    ENDOCRINE  =====================  Stress hyperglycemia       - Continue glucose control with admelog sliding scale and NPH     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 - Free T4 remains elevated >7.9   Per endocrine      - Thyroid US when trach is out      - Continue with hydrocortisone 50q8      - Propanolol as above for optimal T4-->T3 conversion      - c/w Methimazole       - Repeat free thyroxine on     hydrocortisone sodium succinate Injectable 50 milliGRAM(s) IV Push every 8 hours  insulin lispro (ADMELOG) corrective regimen sliding scale   SubCutaneous every 6 hours  insulin NPH human recombinant 10 Unit(s) SubCutaneous every 6 hours  methimazole 20 milliGRAM(s) Oral <User Schedule>    ========================INFECTIOUS DISEASE================  Afebrile, WBC downtrending 16.60->14.66  Continue trending WBC and monitoring fever curve   Culture from sybil drain on : NGTD  D/c'd cefepime and vancomycin this AM       Patient requires continuous monitoring with bedside rhythm monitoring, pulse ox monitoring, and intermittent blood gas analysis. Care plan discussed with ICU care team. Patient remained critical and at risk for life threatening decompensation.     By signing my name below, I, Aparna Saleh, attest that this documentation has been prepared under the direction and in the presence of CLAUDIA Pak   Electronically signed: Cesia Villegas, 21 @ 10:18    I, Lianne Azar, ,I personally performed the services described in this documentation. all medical record entries made by the scribe were at my direction and in my presence. I have reviewed the chart and agree that the record reflects my personal performance and is accurate and complete  Electronically signed: CLAUDIA Pak        RICKY HAMPTON  MRN-[id number]  Patient is a 66y old  Male who presents with a chief complaint of Anemia, Supratherapeutic INR, Dark Stools (31 Aug 2021 08:56)    HPI:  65M PMH ACC/AHA stage D HF due to NICM HM2 LVAD , TV annuloplasty ring 17 as destination therapy due to severe peripheral artery disease with significant stenosis  SIADH, Depression, CKD-3 with hyperkalemia, past E. coli UTIs, driveline drainage (21) and COVID-19 (back in 2020)  He was recently seen in clinic where he complained of abdominal pain and dark stools w constipation back in May. He presents to [facility] ER today weakness and fatigue, moderate and + Black stools for three days, on coumadin secondary to warfarin use in the setting of an LVAD. Patient has required transfusions for GIB in the past. Mostly recently back in 2021 pt had anemia with dark stools. No interventions was done at that time. However Last Endoscopy was done in 2020 (negative). Today labs show patient is anemic with H/H of 4.5/16.3,. INR is 8.84 MAP in the 90s, Temp 35.1. He denies any chest pain, shortness of breath, dizziness, abd pain, nausea or vomiting.       (2021 16:57)      Surgery/Hospital Course:   admit for melena w/ anemia, INR 8.84   6/15 Capsul study (+) for small bowel bleed, balloon endoscopy (old blood in prox ileum); post EGD - septic w/ L opacity, re-intubated for concern for aspiration, TTE (Mod MR, decrease biV w/ interventricular septum boweing towards R)   bronch    +C Diff    TC    CT C/A/P: Fluid filled colon which may be 2/2 rapid transit. Small bilateral pleffs with associates. Compressive atelectasis New ISABELLE & LLL  parenchymal opacities, suspicious for pneumonia. 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    #8 Shiley trach at bedside    CPAP trials    LVAD speed increased to 9200   Bronch; Central line dc'ed   TC since , continue as tolerated. Patient transferred to SDU.    Cont PT, no trach downsize today(#8cuffed)/ Anticoagulation/ Continue TF, speech f/u/ Vanco taper    oob to chair  INR  2.47  5 mg coumadin     increased lop to 25 q8  per HF   INR today 2.64.  H&H 7.3 this AM.  Will repeat CBC at noon, and will send stool guaiac Patient with persistent abdominal tenderness, rate of tube feeds decreased.  No nausea/vomiting.     INR today 2.4H&H 9.1.6 low flow overnight /N&V  NPO resolved for capsule study  Coumadin on hold refusing Tube feeds on D5 1/2 normal  @50 cc/hr   INR 2.69  H&H 7..1 refusing Tube feeds on D5 1/2 normal  @50 cc/hr. This am + BM Anusha Oneill HF  aware- PRBC x1  GI team consulted -  NPO  plan on study in am-  D/w Dr Cadet Patient  to return to CTU for further management; 1PRBCS    Post op INR 2.2 today.  No bleeding. BC + for SM.  Pt is hypotensive requiring pressor and inotropic support.  ID follow up today on Cefepime will follow.   R PTC for PTX    CT C/A/P: sub q emphysema in R chest wall, GGO RUL, small ascites CTH negative; Abd US: GB thickening, pericholecystic fluid     Perchole drain in place continues to drain total output overnight 133.  Fever today 38.8 given tylenol.  Will repeat BC now.     duplex LE negative    Patient with persistent abdominal pain, refusing tube feeds and medications, Psych consulted   CTA A/P ordered to r/o mesenteric ischemia 2/2 persistent anorexia, nausea, vomiting. Revealed:  Evaluation of the mesenteric vessels is limited by streak artifact from LVAD. There appears to be severe stenosis of the proximal SMA; abdominal mesenteric doppler is recommended for further evaluation. 2.  No small bowel findings to suggest acute mesenteric ischemia. 3.  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  8/15: Cultures resulted BC 1/2 +GPC in clusters, SC enterobacter; mesenteric duplex: borderline stenosis of proximal SMA   : CT C/A/P noncon: Nondular opacities in R lung apex w/cavitation, abd nl  :  Continue current care, treatment of thyrotoxicosis with medications as per endocrine, d/c ABX as per team. Ambulate with PT today, continue respiratory and nutritional care.    Today:   D/c'd cefepime and vancomycin this AM. Now increasing tube feeds 10cc/day as tolerated for a goal of 65cc/hr. ?Possible S&S evaluation.     REVIEW OF SYSTEMS:  Patient speaks over trach   Gen: No fever  EYES/ENT: No visual changes;  No vertigo or throat pain   NECK: No pain   RES:  No shortness of breath or Cough  CARD: No chest pain   GI: No abdominal pain  : No dysuria  NEURO: No weakness  SKIN: No itching, rashes     ICU Vital Signs Last 24 Hrs  T(C): 36.2 (31 Aug 2021 08:00), Max: 36.2 (31 Aug 2021 08:00)  T(F): 97.2 (31 Aug 2021 08:00), Max: 97.2 (31 Aug 2021 08:00)  HR: 118 (31 Aug 2021 10:00) (90 - 126)  BP: --  BP(mean): --  ABP: 78/69 (31 Aug 2021 10:00) (75/61 - 137/114)  ABP(mean): 75 (31 Aug 2021 10:00) (68 - 125)  RR: 26 (31 Aug 2021 10:00) (13 - 50)  SpO2: 100% (31 Aug 2021 10:00) (95% - 100%)      Physical Exam:  Gen:  Awake and alert, Sitting in chair in NAD.  Psych: Mood greatly improved, more interactive and participating in care. Would like to be more included in bedside rounding.  CNS:  intact, nonfocal, responds to all commands  Neck: no JVD, +TC   RES : course breath sounds, no wheezing              CVS: +LVAD hum   Abd: Soft, RUQ tenderness by perc sybil site. Sybil drain with bilious fluid. Positive BS throughout.  Skin: No rash  Ext:  no edema    ============================I/O===========================   I&O's Detail    30 Aug 2021 07:  -  31 Aug 2021 07:00  --------------------------------------------------------  IN:    Enteral Tube Flush: 50 mL    IV PiggyBack: 150 mL    Miscellaneous Tube Feedin mL    multiple electrolytes Injection Type 1.: 720 mL  Total IN: 1675 mL    OUT:    Drain (mL): 300 mL    Voided (mL): 326 mL  Total OUT: 626 mL    Total NET: 1049 mL      31 Aug 2021 07:  -  31 Aug 2021 10:18  --------------------------------------------------------  IN:    Enteral Tube Flush: 40 mL    Miscellaneous Tube Feedin mL    multiple electrolytes Injection Type 1.: 90 mL  Total IN: 270 mL    OUT:  Total OUT: 0 mL    Total NET: 270 mL        ============================ LABS =========================                        8.0    14.66 )-----------( 235      ( 31 Aug 2021 00:43 )             26.1         134<L>  |  97  |  25<H>  ----------------------------<  173<H>  4.5   |  25  |  0.49<L>    Ca    10.3      31 Aug 2021 00:44  Phos  2.6       Mg     1.8         TPro  8.3  /  Alb  3.2<L>  /  TBili  0.6  /  DBili  x   /  AST  70<H>  /  ALT  132<H>  /  AlkPhos  313<H>      LIVER FUNCTIONS - ( 31 Aug 2021 00:44 )  Alb: 3.2 g/dL / Pro: 8.3 g/dL / ALK PHOS: 313 U/L / ALT: 132 U/L / AST: 70 U/L / GGT: x             ABG - ( 31 Aug 2021 00:42 )  pH, Arterial: 7.41  pH, Blood: x     /  pCO2: 51    /  pO2: 134   / HCO3: 32    / Base Excess: 6.8   /  SaO2: 99.8                ======================Micro/Rad/Cardio=================  Culture: Reviewed   CXR: Reviewed  Echo:Reviewed  ======================================================  PAST MEDICAL & SURGICAL HISTORY:  CHF (congestive heart failure)    CAD (coronary artery disease)    Depression    Pleural effusion    History of 2019 novel coronavirus disease (COVID-19)  2020    Hemorrhoids    Bleeding hemorrhoids    Peripheral arterial disease    Claudication    BPH with urinary obstruction    ACC/AHA stage D systolic heart failure    Anticoagulation goal of INR 2.0 to 2.5    Falls    Clavicle fracture    CKD (chronic kidney disease), stage III    Iron deficiency anemia    H/O epistaxis    Vertigo    GI bleed    S/P TVR (tricuspid valve repair)    S/P ventricular assist device    S/P endoscopy      ====================ASSESSMENT ==============  Stage D Nonischemic Cardiomyopathy, Status Post HM2 on 2017    Cardiogenic shock  Hemodynamic instability   Acute hypoxemic respiratory failure  GI bleed , Status Post Enteroscopy   Anemia, in setting of melena   Chronic Kidney Disease  Stress hyperglycemia   C.diff positive on    Hypovolemic shock  Septic shock  Leukocytosis    Plan:  ====================== NEUROLOGY=====================  Nonfocal, continue to monitor neuro status   C/w mirtazapine and sertraline for depression  PT/Ambulate as tolerated    mirtazapine 7.5 milliGRAM(s) Oral daily  sertraline 100 milliGRAM(s) Oral daily    ==================== RESPIRATORY======================  Acute hypoxemic respiratory failure s/p #8 betzaida trach on , continue TC as tolerated (TC since )  Continue close monitoring of respiratory rate and breathing pattern, following of ABG’s with A-line monitoring, continuous pulse oximetry monitoring.   Moderate secretions, continue suctioning prn    ====================CARDIOVASCULAR==================  Stage D Nonischemic Cardiomyopathy, Status Post HM2 on 2017; LVAD settings 9200, flow 6  TTE : reveals at least moderate MR, mild AI, severe global LV syst dysfxn, dec RV fxn   Continue invasive hemodynamic monitoring   Propranolol for rate control of HR 2/2 Hyperthyroidism, titrate as tolerated    propranolol 40 milliGRAM(s) Oral every 6 hours    ===================HEMATOLOGIC/ONC ===================  Acute blood loss anemia, monitor H&H/Plts    Continue monitor LDH daily     VTE prophylaxis, heparin   Injectable 5000 Unit(s) SubCutaneous every 8 hours    ===================== RENAL =========================  Continue monitoring urine output, I&OS, BUN/Cr   Replete lytes PRN. Keep K> 4 and Mg >2      ==================== GASTROINTESTINAL===================  S/p cholecystostomy tube placed on : with IR with -60cc of black bile, will monitor site closely.   Recent emesis on  in setting of abdominal pain, as per GI likely component of illeus given critical illness   CTA A/P : Evaluation of the mesenteric vessels is limited by streak artifact from LVAD. There appears to be severe stenosis of the proximal SMA; abdominal mesenteric doppler is recommended for further evaluation. 2.  No small bowel findings to suggest acute mesenteric ischemia. 3.  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  Mesenteric duplex on 8/15 borderline stenosis of proximal SMA   Tolerating tube feeds, Vital 1.5 shantelle @ 40cc/hr, now increasing tube feeds 10cc/day as tolerated for a goal of 65cc/hr; ?Possible S&S evaluation.  If emesis persists, will discuss ?intervention for SMA stenosis? with vascular. CT Abd/Pel on  without acute process   Reglan for gut motility  Zofran PRN nausea/vomiting      multiple electrolytes Injection Type 1 1000 milliLiter(s) (30 mL/Hr) IV Continuous <Continuous>  multivitamin 1 Tablet(s) Oral daily  GI prophylaxis, pantoprazole  Injectable 40 milliGRAM(s) IV Push every 12 hours  simethicone 80 milliGRAM(s) Chew every 8 hours PRN Gas  thiamine 100 milliGRAM(s) Oral daily  ondansetron Injectable 4 milliGRAM(s) IV Push every 6 hours PRN Nausea and/or Vomiting  metoclopramide Injectable 10 milliGRAM(s) IV Push every 8 hours    =======================    ENDOCRINE  =====================  Stress hyperglycemia       - Continue glucose control with admelog sliding scale and NPH     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 - Free T4 remains elevated >7.9   Per endocrine      - Thyroid US when trach is out      - Continue with hydrocortisone 50q8      - Propanolol as above for optimal T4-->T3 conversion      - c/w Methimazole       - Repeat free thyroxine on     hydrocortisone sodium succinate Injectable 50 milliGRAM(s) IV Push every 8 hours  insulin lispro (ADMELOG) corrective regimen sliding scale   SubCutaneous every 6 hours  insulin NPH human recombinant 10 Unit(s) SubCutaneous every 6 hours  methimazole 20 milliGRAM(s) Oral <User Schedule>    ========================INFECTIOUS DISEASE================  Afebrile, WBC downtrending 16.60->14.66  Continue trending WBC and monitoring fever curve   Culture from sybil drain on : NGTD  D/c'd cefepime and vancomycin this AM       Patient requires continuous monitoring with bedside rhythm monitoring, pulse ox monitoring, and intermittent blood gas analysis. Care plan discussed with ICU care team. Patient remained critical and at risk for life threatening decompensation.     By signing my name below, I, Aparna Saleh, attest that this documentation has been prepared under the direction and in the presence of CLAUDIA Pak   Electronically signed: Cesia Villegas, 21 @ 10:18    I, Lianne Azar, ,I personally performed the services described in this documentation. all medical record entries made by the scribe were at my direction and in my presence. I have reviewed the chart and agree that the record reflects my personal performance and is accurate and complete  Electronically signed: CLAUDIA Pak        RICKY HAMPTON  MRN-[id number]  Patient is a 66y old  Male who presents with a chief complaint of Anemia, Supratherapeutic INR, Dark Stools (31 Aug 2021 08:56)    HPI:  65M PMH ACC/AHA stage D HF due to NICM HM2 LVAD , TV annuloplasty ring 17 as destination therapy due to severe peripheral artery disease with significant stenosis  SIADH, Depression, CKD-3 with hyperkalemia, past E. coli UTIs, driveline drainage (21) and COVID-19 (back in 2020)  He was recently seen in clinic where he complained of abdominal pain and dark stools w constipation back in May. He presents to [facility] ER today weakness and fatigue, moderate and + Black stools for three days, on coumadin secondary to warfarin use in the setting of an LVAD. Patient has required transfusions for GIB in the past. Mostly recently back in 2021 pt had anemia with dark stools. No interventions was done at that time. However Last Endoscopy was done in 2020 (negative). Today labs show patient is anemic with H/H of 4.5/16.3,. INR is 8.84 MAP in the 90s, Temp 35.1. He denies any chest pain, shortness of breath, dizziness, abd pain, nausea or vomiting.       (2021 16:57)      Surgery/Hospital Course:   admit for melena w/ anemia, INR 8.84   6/15 Capsul study (+) for small bowel bleed, balloon endoscopy (old blood in prox ileum); post EGD - septic w/ L opacity, re-intubated for concern for aspiration, TTE (Mod MR, decrease biV w/ interventricular septum boweing towards R)   bronch    +C Diff    TC    CT C/A/P: Fluid filled colon which may be 2/2 rapid transit. Small bilateral pleffs with associates. Compressive atelectasis New ISABELLE & LLL  parenchymal opacities, suspicious for pneumonia. 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    #8 Shiley trach at bedside    CPAP trials    LVAD speed increased to 9200   Bronch; Central line dc'ed   TC since , continue as tolerated. Patient transferred to SDU.    Cont PT, no trach downsize today(#8cuffed)/ Anticoagulation/ Continue TF, speech f/u/ Vanco taper    oob to chair  INR  2.47  5 mg coumadin     increased lop to 25 q8  per HF   INR today 2.64.  H&H 7.3 this AM.  Will repeat CBC at noon, and will send stool guaiac Patient with persistent abdominal tenderness, rate of tube feeds decreased.  No nausea/vomiting.     INR today 2.4H&H 9.1.6 low flow overnight /N&V  NPO resolved for capsule study  Coumadin on hold refusing Tube feeds on D5 1/2 normal  @50 cc/hr   INR 2.69  H&H 7..1 refusing Tube feeds on D5 1/2 normal  @50 cc/hr. This am + BM Anusha Oneill HF  aware- PRBC x1  GI team consulted -  NPO  plan on study in am-  D/w Dr Cadet Patient  to return to CTU for further management; 1PRBCS    Post op INR 2.2 today.  No bleeding. BC + for SM.  Pt is hypotensive requiring pressor and inotropic support.  ID follow up today on Cefepime will follow.   R PTC for PTX    CT C/A/P: sub q emphysema in R chest wall, GGO RUL, small ascites CTH negative; Abd US: GB thickening, pericholecystic fluid     Perchole drain in place continues to drain total output overnight 133.  Fever today 38.8 given tylenol.  Will repeat BC now.     duplex LE negative    Patient with persistent abdominal pain, refusing tube feeds and medications, Psych consulted   CTA A/P ordered to r/o mesenteric ischemia 2/2 persistent anorexia, nausea, vomiting. Revealed:  Evaluation of the mesenteric vessels is limited by streak artifact from LVAD. There appears to be severe stenosis of the proximal SMA; abdominal mesenteric doppler is recommended for further evaluation. 2.  No small bowel findings to suggest acute mesenteric ischemia. 3.  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  8/15: Cultures resulted BC 1/2 +GPC in clusters, SC enterobacter; mesenteric duplex: borderline stenosis of proximal SMA   : CT C/A/P noncon: Nondular opacities in R lung apex w/cavitation, abd nl  :  Continue current care, treatment of thyrotoxicosis with medications as per endocrine, d/c ABX as per team. Ambulate with PT today, continue respiratory and nutritional care.    Today:   D/c'd cefepime and vancomycin this AM. Now increasing tube feeds 10cc/day as tolerated for a goal of 65cc/hr. ?Possible S&S evaluation.     REVIEW OF SYSTEMS:  Patient speaks over trach   Gen: No fever  EYES/ENT: No visual changes;  No vertigo or throat pain   NECK: No pain   RES:  No shortness of breath or Cough  CARD: No chest pain   GI: No abdominal pain  : No dysuria  NEURO: No weakness  SKIN: No itching, rashes     ICU Vital Signs Last 24 Hrs  T(C): 36.2 (31 Aug 2021 08:00), Max: 36.2 (31 Aug 2021 08:00)  T(F): 97.2 (31 Aug 2021 08:00), Max: 97.2 (31 Aug 2021 08:00)  HR: 118 (31 Aug 2021 10:00) (90 - 126)  BP: --  BP(mean): --  ABP: 78/69 (31 Aug 2021 10:00) (75/61 - 137/114)  ABP(mean): 75 (31 Aug 2021 10:00) (68 - 125)  RR: 26 (31 Aug 2021 10:00) (13 - 50)  SpO2: 100% (31 Aug 2021 10:00) (95% - 100%)      Physical Exam:  Gen:  Awake and alert, Sitting in chair in NAD.  Psych: Mood greatly improved, more interactive and participating in care. Would like to be more included in bedside rounding.  CNS:  intact, nonfocal, responds to all commands  Neck: no JVD, +TC   RES : course breath sounds, no wheezing              CVS: +LVAD hum   Abd: Soft, RUQ tenderness by perc sybil site. Sybil drain with bilious fluid. Positive BS throughout.  Skin: No rash  Ext:  no edema    ============================I/O===========================   I&O's Detail    30 Aug 2021 07:  -  31 Aug 2021 07:00  --------------------------------------------------------  IN:    Enteral Tube Flush: 50 mL    IV PiggyBack: 150 mL    Miscellaneous Tube Feedin mL    multiple electrolytes Injection Type 1.: 720 mL  Total IN: 1675 mL    OUT:    Drain (mL): 300 mL    Voided (mL): 326 mL  Total OUT: 626 mL    Total NET: 1049 mL      31 Aug 2021 07:  -  31 Aug 2021 10:18  --------------------------------------------------------  IN:    Enteral Tube Flush: 40 mL    Miscellaneous Tube Feedin mL    multiple electrolytes Injection Type 1.: 90 mL  Total IN: 270 mL    OUT:  Total OUT: 0 mL    Total NET: 270 mL        ============================ LABS =========================                        8.0    14.66 )-----------( 235      ( 31 Aug 2021 00:43 )             26.1         134<L>  |  97  |  25<H>  ----------------------------<  173<H>  4.5   |  25  |  0.49<L>    Ca    10.3      31 Aug 2021 00:44  Phos  2.6       Mg     1.8         TPro  8.3  /  Alb  3.2<L>  /  TBili  0.6  /  DBili  x   /  AST  70<H>  /  ALT  132<H>  /  AlkPhos  313<H>      LIVER FUNCTIONS - ( 31 Aug 2021 00:44 )  Alb: 3.2 g/dL / Pro: 8.3 g/dL / ALK PHOS: 313 U/L / ALT: 132 U/L / AST: 70 U/L / GGT: x             ABG - ( 31 Aug 2021 00:42 )  pH, Arterial: 7.41  pH, Blood: x     /  pCO2: 51    /  pO2: 134   / HCO3: 32    / Base Excess: 6.8   /  SaO2: 99.8                ======================Micro/Rad/Cardio=================  Culture: Reviewed   CXR: Reviewed  Echo:Reviewed  ======================================================  PAST MEDICAL & SURGICAL HISTORY:  CHF (congestive heart failure)    CAD (coronary artery disease)    Depression    Pleural effusion    History of 2019 novel coronavirus disease (COVID-19)  2020    Hemorrhoids    Bleeding hemorrhoids    Peripheral arterial disease    Claudication    BPH with urinary obstruction    ACC/AHA stage D systolic heart failure    Anticoagulation goal of INR 2.0 to 2.5    Falls    Clavicle fracture    CKD (chronic kidney disease), stage III    Iron deficiency anemia    H/O epistaxis    Vertigo    GI bleed    S/P TVR (tricuspid valve repair)    S/P ventricular assist device    S/P endoscopy      ====================ASSESSMENT ==============  Stage D Nonischemic Cardiomyopathy, Status Post HM2 on 2017    Cardiogenic shock  Hemodynamic instability   Acute hypoxemic respiratory failure  GI bleed , Status Post Enteroscopy   Anemia, in setting of melena   Chronic Kidney Disease  Stress hyperglycemia   C.diff positive on    Hypovolemic shock  Septic shock  Leukocytosis    Plan:  ====================== NEUROLOGY=====================  Nonfocal, continue to monitor neuro status   C/w mirtazapine and sertraline for depression  PT/Ambulate as tolerated    mirtazapine 7.5 milliGRAM(s) Oral daily  sertraline 100 milliGRAM(s) Oral daily    ==================== RESPIRATORY======================  Acute hypoxemic respiratory failure s/p #8 betzaida trach on , continue TC as tolerated (TC since )  Continue close monitoring of respiratory rate and breathing pattern, following of ABG’s with A-line monitoring, continuous pulse oximetry monitoring.   Moderate secretions, continue suctioning prn    ====================CARDIOVASCULAR==================  Stage D Nonischemic Cardiomyopathy, Status Post HM2 on 2017; LVAD settings 9200, flow 6  TTE : reveals at least moderate MR, mild AI, severe global LV syst dysfxn, dec RV fxn   Continue invasive hemodynamic monitoring   Propranolol for rate control of HR 2/2 Hyperthyroidism, titrate as tolerated    propranolol 40 milliGRAM(s) Oral every 6 hours    ===================HEMATOLOGIC/ONC ===================  Acute blood loss anemia, monitor H&H/Plts    Continue monitor LDH daily     VTE prophylaxis, heparin   Injectable 5000 Unit(s) SubCutaneous every 8 hours    ===================== RENAL =========================  Continue monitoring urine output, I&OS, BUN/Cr   Replete lytes PRN. Keep K> 4 and Mg >2      ==================== GASTROINTESTINAL===================  S/p cholecystostomy tube placed on : with IR with -60cc of black bile, will monitor site closely.   Recent emesis on  in setting of abdominal pain, as per GI likely component of illeus given critical illness   CTA A/P : Evaluation of the mesenteric vessels is limited by streak artifact from LVAD. There appears to be severe stenosis of the proximal SMA; abdominal mesenteric doppler is recommended for further evaluation. 2.  No small bowel findings to suggest acute mesenteric ischemia. 3.  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  Mesenteric duplex on 8/15 borderline stenosis of proximal SMA   Tolerating tube feeds, Vital 1.5 shantelle @ 40cc/hr, now increasing tube feeds 10cc/day as tolerated for a goal of 65cc/hr; ?Possible S&S evaluation.  If emesis persists, will discuss ?intervention for SMA stenosis? with vascular. CT Abd/Pel on  without acute process   Reglan for gut motility  Zofran PRN nausea/vomiting      multiple electrolytes Injection Type 1 1000 milliLiter(s) (30 mL/Hr) IV Continuous <Continuous>  multivitamin 1 Tablet(s) Oral daily  GI prophylaxis, pantoprazole  Injectable 40 milliGRAM(s) IV Push every 12 hours  simethicone 80 milliGRAM(s) Chew every 8 hours PRN Gas  thiamine 100 milliGRAM(s) Oral daily  ondansetron Injectable 4 milliGRAM(s) IV Push every 6 hours PRN Nausea and/or Vomiting  metoclopramide Injectable 10 milliGRAM(s) IV Push every 8 hours    =======================    ENDOCRINE  =====================  Stress hyperglycemia       - Continue glucose control with admelog sliding scale and NPH     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 - Free T4 remains elevated >7.9   Per endocrine      - Thyroid US when trach is out      - Continue with hydrocortisone 50q8      - Propanolol as above for optimal T4-->T3 conversion      - c/w Methimazole       - Repeat free thyroxine on     hydrocortisone sodium succinate Injectable 50 milliGRAM(s) IV Push every 8 hours  insulin lispro (ADMELOG) corrective regimen sliding scale   SubCutaneous every 6 hours  insulin NPH human recombinant 10 Unit(s) SubCutaneous every 6 hours  methimazole 20 milliGRAM(s) Oral <User Schedule>    ========================INFECTIOUS DISEASE================  Afebrile, WBC downtrending 16.60->14.66  Continue trending WBC and monitoring fever curve   Culture from sybil drain on : NGTD  D/c'd cefepime and vancomycin this AM       Patient requires continuous monitoring with bedside rhythm monitoring, pulse ox monitoring, and intermittent blood gas analysis. Care plan discussed with ICU care team. Patient remained critical and at risk for life threatening decompensation.     By signing my name below, I, Aparna Saleh, attest that this documentation has been prepared under the direction and in the presence of CLAUDIA Pak   Electronically signed: Cesia Villegas, 21 @ 10:18    I have spent 35 minutes of non-continuous critical care time with this patient    I, Lianne Azar, ,I personally performed the services described in this documentation. all medical record entries made by the luisibmel were at my direction and in my presence. I have reviewed the chart and agree that the record reflects my personal performance and is accurate and complete  Electronically signed: CLAUDIA Pak

## 2021-08-31 NOTE — PROGRESS NOTE ADULT - ASSESSMENT
65 year old male with history of Stage D HF due to NICM on LVAD,  TV annuloplasty ring 9/12/17 as destination therapy due to severe peripheral artery disease with significant stenosis  SIADH, Depression, CKD-3 with hyperkalemia consulted for management of hyperthyroidism in the setting of recent amiodarone use and 2 CTs with IV contrast on 7/28 and 8/13. Endocrine consulted for hyperthyroidism.       65 year old male with history of Stage D HF due to NICM on LVAD,  TV annuloplasty ring 9/12/17 as destination therapy due to severe peripheral artery disease with significant stenosis  SIADH, Depression, CKD-3 with hyperkalemia, endocrine consulted for management of hyperthyroidism in the setting of recent amiodarone use and 2 CTs with IV contrast on 7/28 and 8/13.

## 2021-09-01 NOTE — PROGRESS NOTE ADULT - SUBJECTIVE AND OBJECTIVE BOX
Chief Complaint/Follow-up on: Hyperthyroid     Subjective:    pt remains tacycardic, propanol doses not given this am  LFTS remain high but stable   had n/v this am, unable to tolerate feeds        MEDICATIONS  (STANDING):  dextrose 40% Gel 15 Gram(s) Oral once  dextrose 5%. 1000 milliLiter(s) (50 mL/Hr) IV Continuous <Continuous>  dextrose 5%. 1000 milliLiter(s) (100 mL/Hr) IV Continuous <Continuous>  dextrose 50% Injectable 25 Gram(s) IV Push once  dextrose 50% Injectable 12.5 Gram(s) IV Push once  dextrose 50% Injectable 25 Gram(s) IV Push once  glucagon  Injectable 1 milliGRAM(s) IntraMuscular once  heparin   Injectable 5000 Unit(s) SubCutaneous every 8 hours  hydrocortisone sodium succinate Injectable 50 milliGRAM(s) IV Push every 8 hours  insulin lispro (ADMELOG) corrective regimen sliding scale   SubCutaneous every 6 hours  insulin NPH human recombinant 12 Unit(s) SubCutaneous <User Schedule>  insulin NPH human recombinant 10 Unit(s) SubCutaneous <User Schedule>  methimazole 20 milliGRAM(s) Oral <User Schedule>  metoclopramide Injectable 10 milliGRAM(s) IV Push every 8 hours  mirtazapine 7.5 milliGRAM(s) Oral daily  multiple electrolytes Injection Type 1 1000 milliLiter(s) (30 mL/Hr) IV Continuous <Continuous>  multivitamin 1 Tablet(s) Oral daily  pantoprazole  Injectable 40 milliGRAM(s) IV Push every 12 hours  propranolol 20 milliGRAM(s) Oral every 6 hours  sertraline 100 milliGRAM(s) Oral daily  thiamine 100 milliGRAM(s) Oral daily    MEDICATIONS  (PRN):  ondansetron Injectable 4 milliGRAM(s) IV Push every 6 hours PRN Nausea and/or Vomiting  simethicone 80 milliGRAM(s) Chew every 8 hours PRN Gas      PHYSICAL EXAM:  VITALS: T(C): 36.7 (09-01-21 @ 08:00)  T(F): 98.1 (09-01-21 @ 08:00), Max: 101.8 (08-31-21 @ 20:00)  HR: 118 (09-01-21 @ 16:00) (103 - 134)  BP: --  RR:  (22 - 50)  SpO2:  (95% - 100%)  Wt(kg): --  GENERAL: NAD, looks ill and frail   EYES: No proptosis, no injection  HEENT:  Atraumatic, Normocephalic, moist mucous membranes  RESPIRATORY: increased resp effort   CARDIOVASCULAR: tachycardic         POCT Blood Glucose.: 148 mg/dL (09-01-21 @ 12:26)  POCT Blood Glucose.: 162 mg/dL (09-01-21 @ 06:06)  POCT Blood Glucose.: 131 mg/dL (08-31-21 @ 17:38)  POCT Blood Glucose.: 202 mg/dL (08-31-21 @ 12:57)  POCT Blood Glucose.: 178 mg/dL (08-31-21 @ 05:31)  POCT Blood Glucose.: 183 mg/dL (08-30-21 @ 17:21)  POCT Blood Glucose.: 234 mg/dL (08-30-21 @ 11:57)  POCT Blood Glucose.: 160 mg/dL (08-30-21 @ 05:01)  POCT Blood Glucose.: 106 mg/dL (08-30-21 @ 00:43)  POCT Blood Glucose.: 208 mg/dL (08-29-21 @ 17:40)    09-01    137  |  98  |  24<H>  ----------------------------<  129<H>  3.8   |  29  |  0.52    EGFR if : 130  EGFR if non : 112    Ca    10.1      09-01  Mg     1.8     09-01  Phos  3.1     09-01    TPro  7.9  /  Alb  3.2<L>  /  TBili  0.5  /  DBili  x   /  AST  74<H>  /  ALT  150<H>  /  AlkPhos  306<H>  09-01          Thyroid Function Tests:  08-31 @ 00:21 TSH -- FreeT4 -- T3 -- Anti TPO 49.9 Anti Thyroglobulin Ab -- TSI --  08-30 @ 10:10 TSH -- FreeT4 >7.8 T3 -- Anti TPO -- Anti Thyroglobulin Ab -- TSI --

## 2021-09-01 NOTE — PROGRESS NOTE ADULT - ASSESSMENT
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 coli UTIs, driveline drainage (1/7/21) and COVID-19, here initially for GIB prolonged hospital course, s/p tracheostomy, acalculous cholecystitis s/p perc dawn. Patient has persistent intermittent abdominal pain. Per CTU team, pt has recently been refusing tube feeds and is being evaluated for chronic mesenteric ischemia vs SMA syndrome.  8/13 bld cxs positive. Consulted for TPN. Prealb 11, prior a1c 5.6, tg 141. 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 Tolerating feeds but with intermittent abd pain. SLP eval recommending npo status.    -cont tube feeds- Vital 1.5 and advance to goal as tolerated (goal of 65 ml/hr) as per CTU team, no present plan to initiate TPN, will reassess need pending clinical course  -fever- work up/management as per primary  -suspect uncontrolled hyperthyroidism contributing to metabolic issues ie. gi motility, hypercalcemia, tachycardia; further management as per endocrine  -h/o abdominal pain/vomiting- as above, zofran prn for nausea; reglan for gut motility  -hypomagnesemia- replete as per primary  -anemia- rec iron supp when feasible  -psych, palliative care, and ethics input noted  -management as per CTU; will remain available as needed    plan discussed with Martínez, agreeable with above    TPN pager [phone], spectra [phone]  M-F 6A-4P, Weekends and holidays 8A-12P

## 2021-09-01 NOTE — PROGRESS NOTE ADULT - SUBJECTIVE AND OBJECTIVE BOX
INFECTIOUS DISEASES FOLLOW UP-- Anitra Prater  [phone]    This is a follow up note for this  66yMale with  Anemia    Acute cholecystitis        ROS:  CONSTITUTIONAL:  No fever, awake and alert    Allergies    Amiodarone Hydrochloride (Other (Severe))    Intolerances        ANTIBIOTICS/RELEVANT:  antimicrobials    immunologic:    OTHER:  dextrose 40% Gel 15 Gram(s) Oral once  dextrose 5%. 1000 milliLiter(s) IV Continuous <Continuous>  dextrose 5%. 1000 milliLiter(s) IV Continuous <Continuous>  dextrose 50% Injectable 25 Gram(s) IV Push once  dextrose 50% Injectable 12.5 Gram(s) IV Push once  dextrose 50% Injectable 25 Gram(s) IV Push once  glucagon  Injectable 1 milliGRAM(s) IntraMuscular once  heparin   Injectable 5000 Unit(s) SubCutaneous every 8 hours  hydrocortisone sodium succinate Injectable 50 milliGRAM(s) IV Push every 8 hours  insulin lispro (ADMELOG) corrective regimen sliding scale   SubCutaneous every 6 hours  insulin NPH human recombinant 12 Unit(s) SubCutaneous <User Schedule>  insulin NPH human recombinant 10 Unit(s) SubCutaneous <User Schedule>  methimazole 20 milliGRAM(s) Oral <User Schedule>  metoclopramide Injectable 10 milliGRAM(s) IV Push every 8 hours  mirtazapine 7.5 milliGRAM(s) Oral daily  multiple electrolytes Injection Type 1 1000 milliLiter(s) IV Continuous <Continuous>  multivitamin 1 Tablet(s) Oral daily  ondansetron Injectable 4 milliGRAM(s) IV Push every 6 hours PRN  pantoprazole  Injectable 40 milliGRAM(s) IV Push every 12 hours  propranolol 20 milliGRAM(s) Oral every 6 hours  sertraline 100 milliGRAM(s) Oral daily  simethicone 80 milliGRAM(s) Chew every 8 hours PRN  thiamine 100 milliGRAM(s) Oral daily      Objective:  Vital Signs Last 24 Hrs  T(C): 36.8 (01 Sep 2021 12:00), Max: 38.8 (31 Aug 2021 20:00)  T(F): 98.3 (01 Sep 2021 12:00), Max: 101.8 (31 Aug 2021 20:00)  HR: 131 (01 Sep 2021 17:00) (103 - 134)  BP: --  BP(mean): --  RR: 39 (01 Sep 2021 17:00) (22 - 50)  SpO2: 92% (01 Sep 2021 17:00) (92% - 100%)    PHYSICAL EXAM:  Constitutional:no acute distress, frail  Eyes:ALONSO, EOMI  Ear/Nose/Throat: no oral lesions, trach site no erythema	  Respiratory: clear BL  Cardiovascular: [id number] sounds  Gastrointestinal:scaphoid, cholecystotomy tube  Extremities:no e/e/c  No Lymphadenopathy  IV sites not inflammed.    LABS:                        7.8    13.59 )-----------( 235      ( 01 Sep 2021 00:41 )             25.4     09-01    137  |  98  |  24<H>  ----------------------------<  129<H>  3.8   |  29  |  0.52    Ca    10.1      01 Sep 2021 00:41  Phos  3.1     09-01  Mg     1.8     09-01    TPro  7.9  /  Alb  3.2<L>  /  TBili  0.5  /  DBili  x   /  AST  74<H>  /  ALT  150<H>  /  AlkPhos  306<H>  09-01          MICROBIOLOGY:            RECENT CULTURES:  08-27 @ 14:30  .Body Fluid Cholecystostomy drain  --  --  --    No growth at 5 days  --  08-27 @ 03:23  .Blood Blood-Peripheral  --  --  --    No Growth Final  --  08-26 @ 22:58  .Blood Blood  --  --  --    No Growth Final  --  08-26 @ 22:56  .Blood Blood  --  --  --    No Growth Final  --      RADIOLOGY & ADDITIONAL STUDIES:    < from: Xray Chest 1 View- PORTABLE-Routine (Xray Chest 1 View- PORTABLE-Routine in AM.) (09.01.21 @ 03:01) >  IMPRESSION:  Clear lungs..    < end of copied text >

## 2021-09-01 NOTE — PROGRESS NOTE ADULT - ASSESSMENT
65 YO M with a history of stage D NICM s/p HM2 on 9/2017 as DT (due to severe PAD) with TV ring, prior COVID-19 infection 4/2020, recurrent syncope post LVAD s/p ILR, and chronic abdominal pain with prior negative workup who was admitted 6/14/21 with symptomatic anemia with Hgb 4.5 in setting of INR 8.8 without hemodynamic instability and has since had a protracted hospitalization. He was transfused and underwent VCE which showed active bleeding in the mid small bowel but subsequent enteroscopy 6/15 did not reveal any active bleeding. He acutely decompensated after procedure with fever/hypertension, low flow alarms, and pulmonary infiltrate with hypoxia requiring intubation from probable aspiration PNA. He was unable to extubated and has since undergone tracheostomy and while tolerating long periods of trach collar still has persistent secretions preventing ability to decannulate. 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 Short term goal is maintenance of adequate nutrition and eventual trach decannulation.

## 2021-09-01 NOTE — PROGRESS NOTE ADULT - ASSESSMENT
65 year old male with history of Stage D HF due to NICM on LVAD,  TV annuloplasty ring 9/12/17 as destination therapy due to severe peripheral artery disease with significant stenosis  SIADH, Depression, CKD-3 with hyperkalemia,  admitted 6/14/21 with symptomatic anemia with Hgb 4.5 in setting of INR 8.8, thereafter with complicated hospital course including VAP, serratia bacteremia with acalculous cholecystitis s/p percutaneous tube, abdominal pain with unclear source other than possible mesenteric ischemia (from possible SMA stenosis? though per notes less likely)  endocrine consulted for management of hyperthyroidism in the setting of recent amiodarone use and 2 CTs with IV contrast on 7/28 and 8/13 and following for steroid induced hyperglycemia on tube feeds (which have been intermittently on and off due to n/v and residual feeds)    #hyperthyroidism   pt has marked hyperthyroidism with free T4 7 which entails severe hyperthyroidism.  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 His LFTS are high though they have been high for a few days and overall stable but high, furthermore in setting of poor cardiac output increase in LFTS can also be seen due to liver injury   hyperthyroidism itself can lead to LFTS elevation (specifically alk phos as well due to increased bone turnover)  Will need to be cautious of MMI if LFTs continue to increase  please check FREE T4 tomorrow- if trending down, will need to decrease MMI   would increase tp propanolol to 40mg po q6 as tolerated  as you need a dose of 160mg/day to decrease peripheral conversion of T4 to T3. Please note the most important treatment of hyperthyroidism is beta blocker to bloc increased sympathetic tone. noted that pt has not received propanolol doses this am. d/w team would give beta blocker given tachycardia   HC 50mg IVP q6      Please note, in some cases of refractory amio induced thyrotoxicocis surgery with total thyroidectomy  has been recommended however the pt is likely a poor surgical candidate given his multiple comorbidities     #steroids induced hyperglycemia   -HbA1c 5.4%  -can c/w  NPH to 12 units sq at 6am and continue 10 units at 12am, 12pm, 6pm.  hold if tube feeds are held (d/w ICU team today)  -continue moderate scale sq q6 if on tube feeds  -if no tube feeds are on, low dose ISS q6hrs and hold NPH  -BS testing q6      Denisha Reyes MD  Attending Physician   Department of Endocrinology, Diabetes and Metabolism     Pager [phone] ([facility])/ [phone] ([facility]) [please provide 10 digit call back number]  [facility] 9-5  [email or web address]  Nights and weekends: [phone]  Please note that this patient may be followed by a different provider tomorrow.   If no answer or after hours, please contact [phone].  For final dc reccomendations, please call [phone] or page the endocrine fellow on call.      -I spent a total time of 40 mins with the patient at bedside/on unit of which more than 50% of time was spent on counseling/coordination of care.    High risk patient with high level decision making,        65 year old male with history of Stage D HF due to NICM on LVAD,  TV annuloplasty ring 9/12/17 as destination therapy due to severe peripheral artery disease with significant stenosis  SIADH, Depression, CKD-3 with hyperkalemia,  admitted 6/14/21 with symptomatic anemia with Hgb 4.5 in setting of INR 8.8, thereafter with complicated hospital course including VAP, serratia bacteremia with acalculous cholecystitis s/p percutaneous tube, abdominal pain with unclear source other than possible mesenteric ischemia (from possible SMA stenosis? though per notes less likely)  endocrine consulted for management of hyperthyroidism in the setting of recent amiodarone use and 2 CTs with IV contrast on 7/28 and 8/13 and following for steroid induced hyperglycemia on tube feeds (which have been intermittently on and off due to n/v and residual feeds)    #hyperthyroidism   pt has marked hyperthyroidism with free T4 7 which entails severe hyperthyroidism.  this was likely due to amiodarone induced hyperthyroidism exacerbated by IV contrast which contains iodeiene, (vs ?nodular disease MNG?)   -Avoid any tests with IV contrast  - TSH rec ab pending  -Continue regimen as thus: (currently on MMI, beta blocker and steroids)  MMI 20mg po bid - watch for biliary issues: alk phos/bilirubin elevations, PTU not MMI is associated with direct hepatotoxicity. His LFTS are high though they have been high for a few days and overall stable but high, furthermore in setting of poor cardiac output increase in LFTS can also be seen due to liver injury   hyperthyroidism itself can lead to LFTS elevation (specifically alk phos as well due to increased bone turnover)  Will need to be cautious of MMI if LFTs continue to increase  please check FREE T4 tomorrow- if trending down, will need to decrease MMI   would increase tp propanolol to 40mg po q6 as tolerated  as you need a dose of 160mg/day to decrease peripheral conversion of T4 to T3. Please note the most important treatment of hyperthyroidism is beta blocker to bloc increased sympathetic tone. noted that pt has not received propanolol doses this am. d/w team would give beta blocker given tachycardia   HC 50mg IVP q6      Please note, in some cases of refractory amio induced thyrotoxicocis surgery with total thyroidectomy  has been recommended however the pt is likely a poor surgical candidate given his multiple comorbidities     #steroids induced hyperglycemia   -HbA1c 5.4%  -can c/w  NPH to 12 units sq at 6am and continue 10 units at 12am, 12pm, 6pm.  hold if tube feeds are held (d/w ICU team today)  -continue moderate scale sq q6 if on tube feeds  -if no tube feeds are on, low dose ISS q6hrs and hold NPH  -BS testing q6      Deinsha Reyes MD  Attending Physician   Department of Endocrinology, Diabetes and Metabolism     Pager [phone] ([facility])/ [phone] ([facility]) [please provide 10 digit call back number]  [facility] 9-5  [email or web address]  Nights and weekends: [phone]  Please note that this patient may be followed by a different provider tomorrow.   If no answer or after hours, please contact [phone].  For final dc reccomendations, please call [phone] or page the endocrine fellow on call.      -I spent a total time of 40 mins with the patient at bedside/on unit of which more than 50% of time was spent on counseling/coordination of care.    High risk patient with high level decision making,

## 2021-09-01 NOTE — PROGRESS NOTE ADULT - SUBJECTIVE AND OBJECTIVE BOX
Subjective:  No overnight events. Tolerated increase in feeds to 50/hr. Denies abdominal pain    Medications:  dextrose 40% Gel 15 Gram(s) Oral once  dextrose 5%. 1000 milliLiter(s) IV Continuous <Continuous>  dextrose 5%. 1000 milliLiter(s) IV Continuous <Continuous>  dextrose 50% Injectable 25 Gram(s) IV Push once  dextrose 50% Injectable 12.5 Gram(s) IV Push once  dextrose 50% Injectable 25 Gram(s) IV Push once  glucagon  Injectable 1 milliGRAM(s) IntraMuscular once  heparin   Injectable 5000 Unit(s) SubCutaneous every 8 hours  hydrocortisone sodium succinate Injectable 50 milliGRAM(s) IV Push every 8 hours  insulin lispro (ADMELOG) corrective regimen sliding scale   SubCutaneous every 6 hours  insulin NPH human recombinant 12 Unit(s) SubCutaneous <User Schedule>  insulin NPH human recombinant 10 Unit(s) SubCutaneous <User Schedule>  methimazole 20 milliGRAM(s) Oral <User Schedule>  metoclopramide Injectable 10 milliGRAM(s) IV Push every 8 hours  mirtazapine 7.5 milliGRAM(s) Oral daily  multiple electrolytes Injection Type 1 1000 milliLiter(s) IV Continuous <Continuous>  multivitamin 1 Tablet(s) Oral daily  ondansetron Injectable 4 milliGRAM(s) IV Push every 6 hours PRN  pantoprazole  Injectable 40 milliGRAM(s) IV Push every 12 hours  propranolol 20 milliGRAM(s) Oral every 6 hours  sertraline 100 milliGRAM(s) Oral daily  simethicone 80 milliGRAM(s) Chew every 8 hours PRN  thiamine 100 milliGRAM(s) Oral daily    Vitals:  T(C): 36.7 (09-01-21 @ 08:00), Max: 38.8 (08-31-21 @ 20:00)  HR: 113 (09-01-21 @ 12:00) (103 - 128)  BP: --  BP(mean): --  RR: 34 (09-01-21 @ 12:00) (22 - 50)  SpO2: 100% (09-01-21 @ 12:00) (95% - 100%)    Daily     Daily         I&O's Summary    31 Aug 2021 07:01  -  01 Sep 2021 07:00  --------------------------------------------------------  IN: 2080 mL / OUT: 1030 mL / NET: 1050 mL    01 Sep 2021 07:01  -  01 Sep 2021 14:31  --------------------------------------------------------  IN: 490 mL / OUT: 420 mL / NET: 70 mL        Physical Exam:  Appearance: No Acute Distress, frail appearing  HEENT: JVP non elevated, s/p trach  Cardiovascular: VAD hums  Respiratory: Clear to auscultation bilaterally  Gastrointestinal: Soft, Non-tender, non-distended	  Skin: no skin lesions  Neurologic: Non-focal  Extremities: No LE edema, warm and well perfused  Psychiatry: A & O x 3, Mood & affect appropriate    LVAD interrogation   Flow: 5.5  Speed: 9200  PI: 6.0  Power: 5.7   Alarms: none  Changes to device programming: none       Labs:                        7.8    13.59 )-----------( 235      ( 01 Sep 2021 00:41 )             25.4     09-01    137  |  98  |  24<H>  ----------------------------<  129<H>  3.8   |  29  |  0.52    Ca    10.1      01 Sep 2021 00:41  Phos  3.1     09-01  Mg     1.8     09-01    TPro  7.9  /  Alb  3.2<L>  /  TBili  0.5  /  DBili  x   /  AST  74<H>  /  ALT  150<H>  /  AlkPhos  306<H>  09-01                Lactate Dehydrogenase, Serum: 238 U/L (09-01 @ 00:41)  Lactate Dehydrogenase, Serum: 185 U/L (08-31 @ 00:44)  Lactate Dehydrogenase, Serum: 237 U/L (08-30 @ 01:06)

## 2021-09-01 NOTE — PROGRESS NOTE ADULT - ASSESSMENT
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 coli UTIs, driveline drainage (1/7/21) and COVID-19 (back in April 2020)  He was recently seen in clinic where he complained of abdominal pain and dark stools w constipation back in May. He presents to [facility] ER today weakness and fatigue, moderate and + Black stools for three days, on coumadin secondary to warfarin use in the setting of an LVAD. Patient has required transfusions for GIB in the past. Mostly recently back in jan 2021 pt had anemia with dark stools. No interventions was done at that time. However Last Endoscopy was done in July 2020 (negative). Today labs show patient is anemic with H/H of 4.5/16.3,. INR is 8.84 MAP in the 90s,   Returned from endoscopy appearing ill tachypneic with chills with new white out of his left lung    Remains with complaints of abdominal pain  afebrile and appears stable  WBC is decreasing to 14k range  Would continue to observe off antimicrobials unless condition changes        Morris Prater MD  [phone]  After 5pm/weekends [phone]

## 2021-09-01 NOTE — PROGRESS NOTE ADULT - PROBLEM SELECTOR PLAN 3
- Chronic in nature with questionable SMA stenosis on imaging, likely also related in part to cholecystitis now treated  - Continue to advance feeds as tolerates  - If abdominal pain continues will re-engage vascular about possibility of stent consideration

## 2021-09-01 NOTE — PROGRESS NOTE ADULT - SUBJECTIVE AND OBJECTIVE BOX
RICKY HAMPTON  MRN-[id number]  Patient is a 66y old  Male who presents with a chief complaint of Anemia, Supratherapeutic INR, Dark Stools (31 Aug 2021 18:55)    HPI:  65M PMH ACC/AHA stage D HF due to NICM HM2 LVAD , TV annuloplasty ring 17 as destination therapy due to severe peripheral artery disease with significant stenosis  SIADH, Depression, CKD-3 with hyperkalemia, past E. coli UTIs, driveline drainage (21) and COVID-19 (back in 2020)  He was recently seen in clinic where he complained of abdominal pain and dark stools w constipation back in May. He presents to [facility] ER today weakness and fatigue, moderate and + Black stools for three days, on coumadin secondary to warfarin use in the setting of an LVAD. Patient has required transfusions for GIB in the past. Mostly recently back in 2021 pt had anemia with dark stools. No interventions was done at that time. However Last Endoscopy was done in 2020 (negative). Today labs show patient is anemic with H/H of 4.5/16.3,. INR is 8.84 MAP in the 90s, Temp 35.1. He denies any chest pain, shortness of breath, dizziness, abd pain, nausea or vomiting.       (2021 16:57)      Surgery/Hospital Course:   admit for melena w/ anemia, INR 8.84   6/15 Capsul study (+) for small bowel bleed, balloon endoscopy (old blood in prox ileum); post EGD - septic w/ L opacity, re-intubated for concern for aspiration, TTE (Mod MR, decrease biV w/ interventricular septum boweing towards R)   bronch    +C Diff    TC    CT C/A/P: Fluid filled colon which may be 2/2 rapid transit. Small bilateral pleffs with associates. Compressive atelectasis New ISABELLE & LLL  parenchymal opacities, suspicious for pneumonia. 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    #8 Shiley trach at bedside    CPAP trials    LVAD speed increased to 9200   Bronch; Central line dc'ed   TC since , continue as tolerated. Patient transferred to SDU.    Cont PT, no trach downsize today(#8cuffed)/ Anticoagulation/ Continue TF, speech f/u/ Vanco taper    oob to chair  INR  2.47  5 mg coumadin     increased lop to 25 q8  per HF   INR today 2.64.  H&H 7.3 this AM.  Will repeat CBC at noon, and will send stool guaiac Patient with persistent abdominal tenderness, rate of tube feeds decreased.  No nausea/vomiting.     INR today 2.4H&H 9.1.6 low flow overnight /N&V  NPO resolved for capsule study  Coumadin on hold refusing Tube feeds on D5 1/2 normal  @50 cc/hr   INR 2.69  H&H 7..1 refusing Tube feeds on D5 1/2 normal  @50 cc/hr. This am + BM Anusha Oneill HF  aware- PRBC x1  GI team consulted -  NPO  plan on study in am-  D/w Dr Cadet Patient  to return to CTU for further management; 1PRBCS    Post op INR 2.2 today.  No bleeding. BC + for SM.  Pt is hypotensive requiring pressor and inotropic support.  ID follow up today on Cefepime will follow.   R PTC for PTX    CT C/A/P: sub q emphysema in R chest wall, GGO RUL, small ascites CTH negative; Abd US: GB thickening, pericholecystic fluid     Perchole drain in place continues to drain total output overnight 133.  Fever today 38.8 given tylenol.  Will repeat BC now.     duplex LE negative    Patient with persistent abdominal pain, refusing tube feeds and medications, Psych consulted   CTA A/P ordered to r/o mesenteric ischemia 2/2 persistent anorexia, nausea, vomiting. Revealed:  Evaluation of the mesenteric vessels is limited by streak artifact from LVAD. There appears to be severe stenosis of the proximal SMA; abdominal mesenteric doppler is recommended for further evaluation. 2.  No small bowel findings to suggest acute mesenteric ischemia. 3.  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  8/15: Cultures resulted BC 1/2 +GPC in clusters, SC enterobacter; mesenteric duplex: borderline stenosis of proximal SMA   : CT C/A/P noncon: Nondular opacities in R lung apex w/cavitation, abd nl  :  Continue current care, treatment of thyrotoxicosis with medications as per endocrine, d/c ABX as per team. Ambulate with PT today, continue respiratory and nutritional care.    Today:    REVIEW OF SYSTEMS:  Patient speaks over trach   Gen: No fever  EYES/ENT: No visual changes;  No vertigo or throat pain   NECK: No pain   RES:  No shortness of breath or Cough  CARD: No chest pain   GI: No abdominal pain  : No dysuria  NEURO: No weakness  SKIN: No itching, rashes       ICU Vital Signs Last 24 Hrs  T(C): 36.7 (01 Sep 2021 04:00), Max: 38.8 (31 Aug 2021 20:00)  T(F): 98.1 (01 Sep 2021 04:00), Max: 101.8 (31 Aug 2021 20:00)  HR: 112 (01 Sep 2021 07:00) (103 - 126)  BP: --  BP(mean): --  ABP: 104/92 (01 Sep 2021 07:00) (78/66 - 106/84)  ABP(mean): 98 (01 Sep 2021 07:00) (72 - 98)  RR: 28 (01 Sep 2021 07:00) (11 - 50)  SpO2: 100% (01 Sep 2021 07:00) (95% - 100%)      Physical Exam:  Gen:  Awake and alert, Sitting in chair in NAD.  Psych: Mood greatly improved, more interactive and participating in care. Would like to be more included in bedside rounding.  CNS:  intact, nonfocal, responds to all commands  Neck: no JVD, +TC   RES : course breath sounds, no wheezing              CVS: +LVAD hum   Abd: Soft, RUQ tenderness by perc sybil site. Sybil drain with bilious fluid. Positive BS throughout.  Skin: No rash  Ext:  no edema    ============================I/O===========================   I&O's Detail    31 Aug 2021 07:01  -  01 Sep 2021 07:00  --------------------------------------------------------  IN:    Enteral Tube Flush: 170 mL    Miscellaneous Tube Feedin mL    multiple electrolytes Injection Type 1.: 720 mL  Total IN: 2080 mL    OUT:    Drain (mL): 280 mL    Voided (mL): 750 mL  Total OUT: 1030 mL    Total NET: 1050 mL        ============================ LABS =========================                        7.8    13.59 )-----------( 235      ( 01 Sep 2021 00:41 )             25.4     09-    137  |  98  |  24<H>  ----------------------------<  129<H>  3.8   |  29  |  0.52    Ca    10.1      01 Sep 2021 00:41  Phos  3.1     09-  Mg     1.8         TPro  7.9  /  Alb  3.2<L>  /  TBili  0.5  /  DBili  x   /  AST  74<H>  /  ALT  150<H>  /  AlkPhos  306<H>      LIVER FUNCTIONS - ( 01 Sep 2021 00:41 )  Alb: 3.2 g/dL / Pro: 7.9 g/dL / ALK PHOS: 306 U/L / ALT: 150 U/L / AST: 74 U/L / GGT: x             ABG - ( 01 Sep 2021 00:16 )  pH, Arterial: 7.45  pH, Blood: x     /  pCO2: 49    /  pO2: 106   / HCO3: 34    / Base Excess: 9.1   /  SaO2: 99.1                ======================Micro/Rad/Cardio=================  Culture: Reviewed   CXR: Reviewed  Echo:Reviewed  ======================================================  PAST MEDICAL & SURGICAL HISTORY:  CHF (congestive heart failure)    CAD (coronary artery disease)    Depression    Pleural effusion    History of 2019 novel coronavirus disease (COVID-19)  2020    Hemorrhoids    Bleeding hemorrhoids    Peripheral arterial disease    Claudication    BPH with urinary obstruction    ACC/AHA stage D systolic heart failure    Anticoagulation goal of INR 2.0 to 2.5    Falls    Clavicle fracture    CKD (chronic kidney disease), stage III    Iron deficiency anemia    H/O epistaxis    Vertigo    GI bleed    S/P TVR (tricuspid valve repair)    S/P ventricular assist device    S/P endoscopy      ====================ASSESSMENT ==============  Stage D Nonischemic Cardiomyopathy, Status Post HM2 on 2017    Cardiogenic shock  Hemodynamic instability   Acute hypoxemic respiratory failure  GI bleed , Status Post Enteroscopy   Anemia, in setting of melena   Chronic Kidney Disease  Stress hyperglycemia   C.diff positive on    Hypovolemic shock  Septic shock  Leukocytosis      Plan:  ====================== NEUROLOGY=====================  Nonfocal, continue to monitor neuro status   C/w mirtazapine and sertraline for depression  PT/Ambulate as tolerated    mirtazapine 7.5 milliGRAM(s) Oral daily  sertraline 100 milliGRAM(s) Oral daily  ==================== RESPIRATORY======================  Acute hypoxemic respiratory failure s/p #8 shiley trach on , continue TC as tolerated (TC since )  Continue close monitoring of respiratory rate and breathing pattern, following of ABG’s with A-line monitoring, continuous pulse oximetry monitoring.   Moderate secretions, continue suctioning prn  ====================CARDIOVASCULAR==================  Stage D Nonischemic Cardiomyopathy, Status Post HM2 on 2017; LVAD settings 9200, flow 5.5  TTE : reveals at least moderate MR, mild AI, severe global LV syst dysfxn, dec RV fxn   Continue invasive hemodynamic monitoring   Propranolol for rate control of HR 2/2 Hyperthyroidism, titrate as tolerated      propranolol 20 milliGRAM(s) Oral every 6 hours  ===================HEMATOLOGIC/ONC ===================  Acute blood loss anemia, monitor H&H/Plts    Continue monitor LDH daily   Continue Heparin for venous thromboembolism prophylaxis.     heparin   Injectable 5000 Unit(s) SubCutaneous every 8 hours  ===================== RENAL =========================  Continue to monitor I/Os, BUN/Creatinine, and urine output.   Goal net negative fluid balance. Replete lytes PRN. Keep K> 4 and Mg >2.     ==================== GASTROINTESTINAL===================  S/p cholecystostomy tube placed on : with IR with -60cc of black bile, will monitor site closely.   Recent emesis on  in setting of abdominal pain, as per GI likely component of illeus given critical illness   CTA A/P : Evaluation of the mesenteric vessels is limited by streak artifact from LVAD. There appears to be severe stenosis of the proximal SMA; abdominal mesenteric doppler is recommended for further evaluation. 2.  No small bowel findings to suggest acute mesenteric ischemia. 3.  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  Mesenteric duplex on 8/15 borderline stenosis of proximal SMA   Tolerating tube feeds, Vital 1.5 shantelle @ 40cc/hr, now increasing tube feeds 10cc/day as tolerated for a goal of 65cc/hr; ?Possible S&S evaluation.  If emesis persists, will discuss ?intervention for SMA stenosis? with vascular. CT Abd/Pel on  without acute process   Reglan for gut motility  Zofran PRN nausea/vomiting    Continue Protonix for stress ulcer prophylaxis.     multiple electrolytes Injection Type 1 1000 milliLiter(s) (30 mL/Hr) IV Continuous <Continuous>  multivitamin 1 Tablet(s) Oral daily  pantoprazole  Injectable 40 milliGRAM(s) IV Push every 12 hours  simethicone 80 milliGRAM(s) Chew every 8 hours PRN Gas  thiamine 100 milliGRAM(s) Oral daily  metoclopramide Injectable 10 milliGRAM(s) IV Push every 8 hours  ondansetron Injectable 4 milliGRAM(s) IV Push every 6 hours PRN Nausea and/or Vomiting  =======================    ENDOCRINE  =====================  Stress hyperglycemia       - Continue glucose control with admelog sliding scale and NPH     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 - Free T4 remains elevated >7.9   Per endocrine      - Thyroid US when trach is out      - Continue with hydrocortisone 50q8      - Propanolol as above for optimal T4-->T3 conversion      - c/w Methimazole       - Repeat free thyroxine on     hydrocortisone sodium succinate Injectable 50 milliGRAM(s) IV Push every 8 hours  insulin lispro (ADMELOG) corrective regimen sliding scale   SubCutaneous every 6 hours  insulin NPH human recombinant 12 Unit(s) SubCutaneous <User Schedule>  insulin NPH human recombinant 10 Unit(s) SubCutaneous <User Schedule>  methimazole 20 milliGRAM(s) Oral <User Schedule>  ========================INFECTIOUS DISEASE================  Afebrile, WBC downtrending 14.66 --> 13.59  Continue trending WBC and monitoring fever curve   Culture from sybil drain on : NGTD      Patient requires continuous monitoring with bedside rhythm monitoring, pulse ox monitoring, and intermittent blood gas analysis. Care plan discussed with ICU care team. Patient remained critical and at risk for life threatening decompensation.     By signing my name below, IJanina Tiffany, attest that this documentation has been prepared under the direction and in the presence of CLAUDIA Johnson   Electronically signed: Emily Roa Scribe, 21 @ 07:33    I, CLAUDIA Johnson  , personally performed the services described in this documentation. all medical record entries made by the luisibmel were at my direction and in my presence. I have reviewed the chart and agree that the record reflects my personal performance and is accurate and complete  Electronically signed: CLAUDIA Johnson        RICKY HAMPTON  MRN-[id number]  Patient is a 66y old  Male who presents with a chief complaint of Anemia, Supratherapeutic INR, Dark Stools (31 Aug 2021 18:55)    HPI:  65M PMH ACC/AHA stage D HF due to NICM HM2 LVAD , TV annuloplasty ring 17 as destination therapy due to severe peripheral artery disease with significant stenosis  SIADH, Depression, CKD-3 with hyperkalemia, past E. coli UTIs, driveline drainage (21) and COVID-19 (back in 2020)  He was recently seen in clinic where he complained of abdominal pain and dark stools w constipation back in May. He presents to [facility] ER today weakness and fatigue, moderate and + Black stools for three days, on coumadin secondary to warfarin use in the setting of an LVAD. Patient has required transfusions for GIB in the past. Mostly recently back in 2021 pt had anemia with dark stools. No interventions was done at that time. However Last Endoscopy was done in 2020 (negative). Today labs show patient is anemic with H/H of 4.5/16.3,. INR is 8.84 MAP in the 90s, Temp 35.1. He denies any chest pain, shortness of breath, dizziness, abd pain, nausea or vomiting.       (2021 16:57)      Surgery/Hospital Course:   admit for melena w/ anemia, INR 8.84   6/15 Capsul study (+) for small bowel bleed, balloon endoscopy (old blood in prox ileum); post EGD - septic w/ L opacity, re-intubated for concern for aspiration, TTE (Mod MR, decrease biV w/ interventricular septum boweing towards R)   bronch    +C Diff    TC    CT C/A/P: Fluid filled colon which may be 2/2 rapid transit. Small bilateral pleffs with associates. Compressive atelectasis New ISABELLE & LLL  parenchymal opacities, suspicious for pneumonia. 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    #8 Shiley trach at bedside    CPAP trials    LVAD speed increased to 9200   Bronch; Central line dc'ed   TC since , continue as tolerated. Patient transferred to SDU.    Cont PT, no trach downsize today(#8cuffed)/ Anticoagulation/ Continue TF, speech f/u/ Vanco taper    oob to chair  INR  2.47  5 mg coumadin     increased lop to 25 q8  per HF   INR today 2.64.  H&H 7.3 this AM.  Will repeat CBC at noon, and will send stool guaiac Patient with persistent abdominal tenderness, rate of tube feeds decreased.  No nausea/vomiting.     INR today 2.4H&H 9.1.6 low flow overnight /N&V  NPO resolved for capsule study  Coumadin on hold refusing Tube feeds on D5 1/2 normal  @50 cc/hr   INR 2.69  H&H 7..1 refusing Tube feeds on D5 1/2 normal  @50 cc/hr. This am + BM Anusha Oneill HF  aware- PRBC x1  GI team consulted -  NPO  plan on study in am-  D/w Dr Cadet Patient  to return to CTU for further management; 1PRBCS    Post op INR 2.2 today.  No bleeding. BC + for SM.  Pt is hypotensive requiring pressor and inotropic support.  ID follow up today on Cefepime will follow.   R PTC for PTX    CT C/A/P: sub q emphysema in R chest wall, GGO RUL, small ascites CTH negative; Abd US: GB thickening, pericholecystic fluid     Perchole drain in place continues to drain total output overnight 133.  Fever today 38.8 given tylenol.  Will repeat BC now.     duplex LE negative    Patient with persistent abdominal pain, refusing tube feeds and medications, Psych consulted   CTA A/P ordered to r/o mesenteric ischemia 2/2 persistent anorexia, nausea, vomiting. Revealed:  Evaluation of the mesenteric vessels is limited by streak artifact from LVAD. There appears to be severe stenosis of the proximal SMA; abdominal mesenteric doppler is recommended for further evaluation. 2.  No small bowel findings to suggest acute mesenteric ischemia. 3.  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  8/15: Cultures resulted BC 1/2 +GPC in clusters, SC enterobacter; mesenteric duplex: borderline stenosis of proximal SMA   : CT C/A/P noncon: Nondular opacities in R lung apex w/cavitation, abd nl  :  Continue current care, treatment of thyrotoxicosis with medications as per endocrine, d/c ABX as per team. Ambulate with PT today, continue respiratory and nutritional care.    Today: Continue to increase TF by 5cc QD to goal of 65cc/hr. Mobilize as tolerated    REVIEW OF SYSTEMS:  Patient speaks over trach   Gen: No fever  EYES/ENT: No visual changes;  No vertigo or throat pain   NECK: No pain   RES:  No shortness of breath or Cough  CARD: No chest pain   GI: No abdominal pain  : No dysuria  NEURO: No weakness  SKIN: No itching, rashes       ICU Vital Signs Last 24 Hrs  T(C): 36.7 (01 Sep 2021 04:00), Max: 38.8 (31 Aug 2021 20:00)  T(F): 98.1 (01 Sep 2021 04:00), Max: 101.8 (31 Aug 2021 20:00)  HR: 112 (01 Sep 2021 07:00) (103 - 126)  BP: --  BP(mean): --  ABP: 104/92 (01 Sep 2021 07:00) (78/66 - 106/84)  ABP(mean): 98 (01 Sep 2021 07:00) (72 - 98)  RR: 28 (01 Sep 2021 07:00) (11 - 50)  SpO2: 100% (01 Sep 2021 07:00) (95% - 100%)      Physical Exam:  Gen:  Awake and alert, Sitting in chair in NAD.  Psych: Mood greatly improved, more interactive and participating in care. Would like to be more included in bedside rounding.  CNS:  intact, nonfocal, responds to all commands  Neck: no JVD, +TC   RES : course breath sounds, no wheezing              CVS: +LVAD hum   Abd: Soft, RUQ tenderness by perc sybil site. Sybil drain with bilious fluid. Positive BS throughout.  Skin: No rash  Ext:  no edema    ============================I/O===========================   I&O's Detail    31 Aug 2021 07:01  -  01 Sep 2021 07:00  --------------------------------------------------------  IN:    Enteral Tube Flush: 170 mL    Miscellaneous Tube Feedin mL    multiple electrolytes Injection Type 1.: 720 mL  Total IN: 2080 mL    OUT:    Drain (mL): 280 mL    Voided (mL): 750 mL  Total OUT: 1030 mL    Total NET: 1050 mL        ============================ LABS =========================                        7.8    13.59 )-----------( 235      ( 01 Sep 2021 00:41 )             25.4         137  |  98  |  24<H>  ----------------------------<  129<H>  3.8   |  29  |  0.52    Ca    10.1      01 Sep 2021 00:41  Phos  3.1       Mg     1.8         TPro  7.9  /  Alb  3.2<L>  /  TBili  0.5  /  DBili  x   /  AST  74<H>  /  ALT  150<H>  /  AlkPhos  306<H>      LIVER FUNCTIONS - ( 01 Sep 2021 00:41 )  Alb: 3.2 g/dL / Pro: 7.9 g/dL / ALK PHOS: 306 U/L / ALT: 150 U/L / AST: 74 U/L / GGT: x             ABG - ( 01 Sep 2021 00:16 )  pH, Arterial: 7.45  pH, Blood: x     /  pCO2: 49    /  pO2: 106   / HCO3: 34    / Base Excess: 9.1   /  SaO2: 99.1                ======================Micro/Rad/Cardio=================  Culture: Reviewed   CXR: Reviewed  Echo:Reviewed  ======================================================  PAST MEDICAL & SURGICAL HISTORY:  CHF (congestive heart failure)    CAD (coronary artery disease)    Depression    Pleural effusion    History of 2019 novel coronavirus disease (COVID-19)  2020    Hemorrhoids    Bleeding hemorrhoids    Peripheral arterial disease    Claudication    BPH with urinary obstruction    ACC/AHA stage D systolic heart failure    Anticoagulation goal of INR 2.0 to 2.5    Falls    Clavicle fracture    CKD (chronic kidney disease), stage III    Iron deficiency anemia    H/O epistaxis    Vertigo    GI bleed    S/P TVR (tricuspid valve repair)    S/P ventricular assist device    S/P endoscopy      ====================ASSESSMENT ==============  Stage D Nonischemic Cardiomyopathy, Status Post HM2 on 2017    Cardiogenic shock  Hemodynamic instability   Acute hypoxemic respiratory failure  GI bleed , Status Post Enteroscopy   Anemia, in setting of melena   Chronic Kidney Disease  Stress hyperglycemia   C.diff positive on    Hypovolemic shock  Septic shock  Leukocytosis      Plan:  ====================== NEUROLOGY=====================  Nonfocal, continue to monitor neuro status   C/w mirtazapine and sertraline for depression  PT/Ambulate as tolerated    mirtazapine 7.5 milliGRAM(s) Oral daily  sertraline 100 milliGRAM(s) Oral daily  ==================== RESPIRATORY======================  Acute hypoxemic respiratory failure s/p #8 shiley trach on , continue TC as tolerated (TC since )  Continue close monitoring of respiratory rate and breathing pattern, following of ABG’s with A-line monitoring, continuous pulse oximetry monitoring.   Moderate secretions, continue suctioning prn  ====================CARDIOVASCULAR==================  Stage D Nonischemic Cardiomyopathy, Status Post HM2 on 2017; LVAD settings 9200, flow 5.5  TTE : reveals at least moderate MR, mild AI, severe global LV syst dysfxn, dec RV fxn   Continue invasive hemodynamic monitoring   Propranolol for rate control of HR 2/2 Hyperthyroidism, titrate as tolerated    propranolol 20 milliGRAM(s) Oral every 6 hours  ===================HEMATOLOGIC/ONC ===================  Acute blood loss anemia, monitor H&H/Plts    Continue monitor LDH daily   Continue Heparin for venous thromboembolism prophylaxis.     heparin   Injectable 5000 Unit(s) SubCutaneous every 8 hours  ===================== RENAL =========================  Continue to monitor I/Os, BUN/Creatinine, and urine output.   Goal net negative fluid balance. Replete lytes PRN. Keep K> 4 and Mg >2.     ==================== GASTROINTESTINAL===================  S/p cholecystostomy tube placed on : with IR with -60cc of black bile, will monitor site closely.   Recent emesis on  in setting of abdominal pain, as per GI likely component of illeus given critical illness   CTA A/P : Evaluation of the mesenteric vessels is limited by streak artifact from LVAD. There appears to be severe stenosis of the proximal SMA; abdominal mesenteric doppler is recommended for further evaluation. 2.  No small bowel findings to suggest acute mesenteric ischemia. 3.  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  Mesenteric duplex on 8/15 borderline stenosis of proximal SMA   Tolerating tube feeds, Vital 1.5 shantelle @ 40cc/hr, now increasing tube feeds 10cc/day as tolerated for a goal of 65cc/hr; ?Possible S&S evaluation.  If emesis persists, will discuss ?intervention for SMA stenosis? with vascular. CT Abd/Pel on  without acute process   Reglan for gut motility  Zofran PRN nausea/vomiting    Continue Protonix for stress ulcer prophylaxis.     multiple electrolytes Injection Type 1 1000 milliLiter(s) (30 mL/Hr) IV Continuous <Continuous>  multivitamin 1 Tablet(s) Oral daily  pantoprazole  Injectable 40 milliGRAM(s) IV Push every 12 hours  simethicone 80 milliGRAM(s) Chew every 8 hours PRN Gas  thiamine 100 milliGRAM(s) Oral daily  metoclopramide Injectable 10 milliGRAM(s) IV Push every 8 hours  ondansetron Injectable 4 milliGRAM(s) IV Push every 6 hours PRN Nausea and/or Vomiting  =======================    ENDOCRINE  =====================  Stress hyperglycemia       - Continue glucose control with admelog sliding scale and NPH     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 - Free T4 remains elevated >7.9   Per endocrine      - Thyroid US when trach is out      - Continue with hydrocortisone 50q8      - Propanolol as above for optimal T4-->T3 conversion      - c/w Methimazole       - Repeat free thyroxine on     hydrocortisone sodium succinate Injectable 50 milliGRAM(s) IV Push every 8 hours  insulin lispro (ADMELOG) corrective regimen sliding scale   SubCutaneous every 6 hours  insulin NPH human recombinant 12 Unit(s) SubCutaneous <User Schedule>  insulin NPH human recombinant 10 Unit(s) SubCutaneous <User Schedule>  methimazole 20 milliGRAM(s) Oral <User Schedule>  ========================INFECTIOUS DISEASE================  Afebrile, WBC downtrending 14.66 --> 13.59  Continue trending WBC and monitoring fever curve   Culture from sybil drain on : NGTD      Patient requires continuous monitoring with bedside rhythm monitoring, pulse ox monitoring, and intermittent blood gas analysis. Care plan discussed with ICU care team. Patient remained critical and at risk for life threatening decompensation.     By signing my name below, Janina STANLEY Tiffany, attest that this documentation has been prepared under the direction and in the presence of CLAUDIA Jonhson   Electronically signed: Emily Roa Scribe, 21 @ 07:33    I, CLAUDIA Johnson  , personally performed the services described in this documentation. all medical record entries made by the scribe were at my direction and in my presence. I have reviewed the chart and agree that the record reflects my personal performance and is accurate and complete  Electronically signed: CLAUDIA Johnson

## 2021-09-01 NOTE — PROGRESS NOTE ADULT - SUBJECTIVE AND OBJECTIVE BOX
[facility] NUTRITION SUPPORT-- FOLLOW UP NOTE  --------------------------------------------------------------------------------  24 hour events/subjective: pt seen and examined earlier this am. febrile overnight. tf running at 50cc/hr. lethargic but easily arousable. c/o abd pain pain. reports +gi fxn.  seen by speech yesterday, recs for continued npo status    Diet:  Diet, NPO with Tube Feed:   Tube Feeding Modality: Nasogastric  Vital 1.5 Tank (VITAL1.5RTH)  Total Volume for 24 Hours (mL): 1560  Continuous  Starting Tube Feed Rate mL per Hour: 10  Increase Tube Feed Rate by (mL): 5     Every 24 hours  Until Goal Tube Feed Rate (mL per Hour): 65  Tube Feed Duration (in Hours): 24  Tube Feed Start Time: 11:45  Supplement Feeding Modality:  Nasogastric  Probiotic Yogurt/Smoothie Cans or Servings Per Day:  2       Frequency:  Daily (21 @ 00:07)      PAST HISTORY  --------------------------------------------------------------------------------  No significant changes to PMH, PSH, FHx, SHx, unless otherwise noted    ALLERGIES & MEDICATIONS  --------------------------------------------------------------------------------  Allergies    Amiodarone Hydrochloride (Other (Severe))    Intolerances      Standing Inpatient Medications  heparin   Injectable 5000 Unit(s) SubCutaneous every 8 hours  hydrocortisone sodium succinate Injectable 50 milliGRAM(s) IV Push every 8 hours  insulin lispro (ADMELOG) corrective regimen sliding scale   SubCutaneous every 6 hours  insulin NPH human recombinant 12 Unit(s) SubCutaneous <User Schedule>  insulin NPH human recombinant 10 Unit(s) SubCutaneous <User Schedule>  methimazole 20 milliGRAM(s) Oral <User Schedule>  metoclopramide Injectable 10 milliGRAM(s) IV Push every 8 hours  mirtazapine 7.5 milliGRAM(s) Oral daily  multiple electrolytes Injection Type 1 1000 milliLiter(s) IV Continuous <Continuous>  multivitamin 1 Tablet(s) Oral daily  pantoprazole  Injectable 40 milliGRAM(s) IV Push every 12 hours  propranolol 20 milliGRAM(s) Oral every 6 hours  sertraline 100 milliGRAM(s) Oral daily  thiamine 100 milliGRAM(s) Oral daily    PRN Inpatient Medications  ondansetron Injectable 4 milliGRAM(s) IV Push every 6 hours PRN  simethicone 80 milliGRAM(s) Chew every 8 hours PRN        REVIEW OF SYSTEMS  --------------------------------------------------------------------------------    General:  as per hpi  HEENT:  no headaches, no vision changes, no ear pain  CV:  no chest pain, no palpitations  Resp:  see hpi  GI:  as per hpi  :  no pain, no bleeding, no dysuria  Muscle:  no pain, no weakness  Neuro:  no numbness, no tingling, no memory problems  Psych:  no insomnia  Endocrine:  no cold/heat intolerance  Heme: no ecchymosis, no easy bruising  Skin:  no rash, no edema        VITALS/PHYSICAL EXAM  --------------------------------------------------------------------------------  T(C): 36.7 (21 @ 04:00), Max: 38.8 (21 @ 20:00)  HR: 128 (21 @ 09:38) (103 - 128)  BP: --  RR: 28 (21 @ 09:38) (11 - 50)  SpO2: 99% (21 @ 09:38) (95% - 100%)  Wt(kg): --      21 @ 07:01  -  21 @ 07:00  --------------------------------------------------------  IN: 2080 mL / OUT: 1030 mL / NET: 1050 mL    21 @ 07:  -  21 @ 10:23  --------------------------------------------------------  IN: 220 mL / OUT: 420 mL / NET: -200 mL      I&O's Detail    31 Aug 2021 07:  -  01 Sep 2021 07:00  --------------------------------------------------------  IN:    Enteral Tube Flush: 170 mL    Miscellaneous Tube Feedin mL    multiple electrolytes Injection Type 1.: 720 mL  Total IN: 2080 mL    OUT:    Drain (mL): 280 mL    Voided (mL): 750 mL  Total OUT: 1030 mL    Total NET: 1050 mL      01 Sep 2021 07:  -  01 Sep 2021 10:23  --------------------------------------------------------  IN:    Enteral Tube Flush: 50 mL    Miscellaneous Tube Feedin mL    multiple electrolytes Injection Type 1.: 60 mL  Total IN: 220 mL    OUT:    Drain (mL): 120 mL    Voided (mL): 300 mL  Total OUT: 420 mL    Total NET: -200 mL          Physical Exam:  Gen: lying in bed  HEENT: +trach +ngt   Chest: respirations non labored  Abd: soft  nd +perc c-tube  LE: no edema  Neuro: lethargic but easily arousable and  responsive        LABS/STUDIES  --------------------------------------------------------------------------------              7.8    13.59 >-----------<  235      [21 @ 00:41]              25.4     137  |  98  |  24  ----------------------------<  129      [21 @ 00:41]  3.8   |  29  |  0.52        Ca     10.1     [21 @ 00:41]      Mg     1.8     [21 @ 00:41]      Phos  3.1     [21 @ 00:41]    TPro  7.9  /  Alb  3.2  /  TBili  0.5  /  DBili  x   /  AST  74  /  ALT  150  /  AlkPhos  306  [21 @ 00:41]              [21 00:41]    Ca ionizedBlood Gas Arterial - Calcium, Ionized: 1.36 mmol/L (21 @ 00:16)  Blood Gas Arterial - Calcium, Ionized: 1.37 mmol/L (21 @ 00:42)    Creatinine Trend:  POC glucoseGlucose, Serum: 129 mg/dL (21 @ 00:41)  CAPILLARY BLOOD GLUCOSE      POCT Blood Glucose.: 162 mg/dL (01 Sep 2021 06:06)  POCT Blood Glucose.: 131 mg/dL (31 Aug 2021 17:38)  POCT Blood Glucose.: 202 mg/dL (31 Aug 2021 12:57)    PrealbuminPrealbumin, Serum: 11 mg/dL (21 @ 04:01)  Prealbumin, Serum: 10 mg/dL (08-15-21 @ 13:53)    Triglycerides

## 2021-09-01 NOTE — CHART NOTE - NSCHARTNOTEFT_GEN_A_CORE
Pt seen at bedside for speaking valve trial and repeat swallow evaluation. Pt awake and alert, following directions, generally calm and cooperative for session. Pt with Portex 6 trach, cuff deflated, on FiO2 40% via TC. Pt with baseline VSS notable for RR 30s, O2 sat 100, HR 130s. PMV was placed on hub of trach, with VSS notable for O2 sat  HR 130s. RR initially remained unchanged, but with cues to breathe in through nose and out through pursed lip, Pt's rate gradually slowed to the 20s and remained there during subsequent conversation. After a coughing episode, RR karthik to low 40s, and Pt responsive again to cues to slow rate, able to slow rate to 26-30 for remainder of session. Pt appears increasingly stimulable for slowing RR, supporting suspected component of anxiety/ agitation influencing RR. Pt's vocal quality clear, without wet/gurgly quality, with secretion management that appears improved today. RN reports that Pt was suctioned a short time ago, after episodes of emesis, with NGT feeds currently on hold. Pt's vocal intensity and quality appear to be improving, and Pt with improving cooperation for speech therapy tasks also noted.    Pt was administered limited PO trials of small ice chips. Pt presents with evidence of an oropharyngeal dysphagia notable for prolonged oral phase, suspected delay in pharyngeal swallow trigger, suspected reduced hyolaryngeal elevation/excursion, but no overt s/s laryngeal penetration/aspiration were observed today. Eventually, Pt appeared to become tired, and so PMV removed after 35 minutes. Purposeful proactive rounding reinforced and 5 Ps addressed. Pt left in no distress.     Pt with gradually improving tolerance of PMV trials; improved secretion management, and improved tolerance of limited trials of ice chips noted today. However, wish to confirm that improvement remains consistent over multiple sessions.  Will continue to follow for dysphagia therapy and PMV trials. Pt would benefit from additional time and therapy prior to repeat assessment.  Findings d/w covering RN, Pt and CLAUDIA Carty.    RECOMMENDATIONS  1) Continue NPO, with non-oral nutrition/hydration/medications.   2) Strict aspiration and reflux precautions for secretions and enteral feeds.  3) Maintain good oral hygiene.   4) Restorative swallow therapy, and PMV trials with SLP only. Will continue to follow while patient is in-house, as schedule permits.   5) d/c tbd pending hospital course.    Gege Hernandez MA, CCC-SLP  Speech-Language Pathologist  pager #[phone]

## 2021-09-01 NOTE — PATIENT PROFILE ADULT. - NS MD HP PRESSURE ULCER DRAIN
bleeding. Physical Exam:  No intake/output data recorded. Intake/Output Summary (Last 24 hours) at 9/1/2021 0724  Last data filed at 9/1/2021 0556  Gross per 24 hour   Intake 120 ml   Output 400 ml   Net -280 ml   I/O last 3 completed shifts: In: 120 [P.O.:120]  Out: 400 [Urine:400]  Patient Vitals for the past 96 hrs (Last 3 readings):   Weight   08/31/21 1035 210 lb (95.3 kg)     Vital Signs:   Blood pressure 127/67, pulse 79, temperature 98.6 °F (37 °C), resp. rate 18, height 5' 8\" (1.727 m), weight 210 lb (95.3 kg), SpO2 100 %. General appearance:  Alert, responsive, oriented to person, place, and time. Well preserved, alert, no distress. Head:  Normocephalic. No masses, lesions or tenderness. Eyes:  PERRLA. EOMI. Sclera clear. Buccal mucosa moist.  ENT:  Ears normal. Mucosa normal.  Neck:    Supple. Trachea midline. No thyromegaly. No JVD. No bruits. Heart:    Rhythm regular. Rate controlled. No murmurs. Lungs:    Symmetrical. Clear to auscultation bilaterally. No wheezes. No rhonchi. No rales. Abdomen:   Distended. Voluntary guarding is elicited. Bowel sounds are active. Extremities:    Peripheral pulses present. No peripheral edema. No ulcers. No cyanosis. No clubbing. Neurologic:    Alert x 3. No focal deficit. Cranial nerves grossly intact. No focal weakness. Psych:   Behavior is normal. Mood appears normal. Speech is not rapid and/or pressured. Musculoskeletal:   Spine ROM normal. Muscular strength intact. Gait not assessed. Integumentary:  No rashes  Skin normal color and texture.   Genitalia/Breast:  Deferred    Medication:  Scheduled Meds:   lactulose  30 g Oral TID    cefTRIAXone (ROCEPHIN) IV  1,000 mg IntraVENous Q24H    enoxaparin  40 mg SubCUTAneous Daily    sodium chloride flush  5-40 mL IntraVENous 2 times per day    thiamine  100 mg Oral Daily    multivitamin  1 tablet Oral Daily    folic acid  1 mg Oral Daily    rifaximin  550 mg Oral BID    levothyroxine 100 mcg Oral Daily    mirtazapine  15 mg Oral Nightly    pantoprazole  40 mg Oral BID    cyanocobalamin  2,000 mcg Oral Daily    pyridoxine  25 mg Oral Daily    vitamin D  50,000 Units Oral Weekly     Continuous Infusions:   sodium chloride      sodium chloride         Objective Data:  CBC with Differential:    Lab Results   Component Value Date    WBC 5.2 09/01/2021    RBC 2.26 09/01/2021    HGB 7.5 09/01/2021    HCT 23.2 09/01/2021    PLT 70 09/01/2021    .7 09/01/2021    MCH 33.2 09/01/2021    MCHC 32.3 09/01/2021    RDW 18.9 09/01/2021    SEGSPCT 59 08/11/2011    METASPCT 3 07/05/2011    LYMPHOPCT 13.2 09/01/2021    MONOPCT 2.6 09/01/2021    MYELOPCT 2 02/22/2011    BASOPCT 0.8 09/01/2021    MONOSABS 0.16 09/01/2021    LYMPHSABS 0.68 09/01/2021    EOSABS 0.14 09/01/2021    BASOSABS 0.00 09/01/2021     CMP:    Lab Results   Component Value Date     09/01/2021    K 3.0 09/01/2021    K 2.9 01/27/2021     09/01/2021    CO2 17 09/01/2021    BUN 7 09/01/2021    CREATININE 0.9 09/01/2021    GFRAA >60 09/01/2021    LABGLOM >60 09/01/2021    GLUCOSE 98 09/01/2021    GLUCOSE 125 08/11/2011    PROT 6.5 09/01/2021    LABALBU 2.3 09/01/2021    LABALBU 3.6 08/11/2011    CALCIUM 8.2 09/01/2021    BILITOT 15.5 09/01/2021    ALKPHOS 172 09/01/2021     09/01/2021    ALT 58 09/01/2021       Assessment:  1. Decompensated alcoholic cirrhosis complicated by acute alcoholic hepatitis and tylenol/benadryl overuse/misuse  2. Hypervolemic hypoosmolar hyponatremia  3. Hypokalemia  4. Hyperammonemia with developing hepatic encephalopathy   5. Normocytic anemia  6. Ulcerative colitis with recent perirectal abscess and resultant bacteremia per history treated at [facility] [place][note 2]  7. Hypothyroidism  8. Generalized anxiety and depression  9. Dysphonia  10.  Thrombocytopenia secondary to portal hypertension and hepatosplenomegaly with sequestration    Plan:   We appreciate the input from the GI team. Cirrhotic medications are being maximized. There was not enough fluid for paracentesis yesterday. We will maintain IV Rocephin for an additional 24 hours. She does not appear to have any infective symptomatology. She is having adequate bowel movements and ammonia is trending downward. We again reinforced the need to avoid Tylenol as an outpatient as well as ongoing cessation of alcohol. I have encouraged her to become increasingly ambulatory today. I suspect she will be acceptable for discharge home tomorrow. Continue current therapy. See orders for further plan of care. More than 50% of my time was spent at the bedside counseling/coordinating care with the patient and/or family with face to face contact. This time was spent reviewing notes and laboratory data as well as instructing and counseling the patient. Time I spent with the family or surrogate(s) is included only if the patient was incapable of providing the necessary information or participating in medical decisions. I also discussed the differential diagnosis and all of the proposed management plans with the patient and individuals accompanying the patient. I am readily available for any further decision-making and intervention.        Jaden Rodrigues DO, F.A.C.O.I.  9/1/2021  7:24 AM no

## 2021-09-01 NOTE — PROGRESS NOTE ADULT - PROBLEM SELECTOR PLAN 4
- serratia bacteremia and poss acalculous cholecystitis. B/c with gram + cocci and many enterobacter Carbapenem resistant strain and now s/p per dawn drain  - also with enterobacter from sputum culture 8/13  - currently off antibiotics  - will need eventual consideration for cholecystostomy when optimized

## 2021-09-02 NOTE — PROGRESS NOTE ADULT - SUBJECTIVE AND OBJECTIVE BOX
[facility] NUTRITION SUPPORT-- FOLLOW UP NOTE  --------------------------------------------------------------------------------    24 hour events/subjective: pt seen and examined. vomited yesterday afternoon and tf held, later resumed at lower rate. overnight no vomiting per rn and tolerating feeds at 30cc/hr. had loose bm x2. slp macario noted, rec'ing npo. wbc uptrending and hypothermic this am, cxs sent. hr improved. pt states abd pain the same. denies nausea and further vomiting.     Diet:  Diet, NPO with Tube Feed:   Tube Feeding Modality: Nasogastric  Vital 1.5 Tank (VITAL1.5RTH)  Total Volume for 24 Hours (mL): 1560  Continuous  Starting Tube Feed Rate mL per Hour: 10  Increase Tube Feed Rate by (mL): 5     Every 24 hours  Until Goal Tube Feed Rate (mL per Hour): 65  Tube Feed Duration (in Hours): 24  Tube Feed Start Time: 11:45  Supplement Feeding Modality:  Nasogastric  Probiotic Yogurt/Smoothie Cans or Servings Per Day:  2       Frequency:  Daily (21 @ 00:07)      PAST HISTORY  --------------------------------------------------------------------------------  No significant changes to PMH, PSH, FHx, SHx, unless otherwise noted    ALLERGIES & MEDICATIONS  --------------------------------------------------------------------------------  Allergies    Amiodarone Hydrochloride (Other (Severe))    Intolerances      Standing Inpatient Medications  dextrose 40% Gel 15 Gram(s) Oral once  dextrose 5%. 1000 milliLiter(s) IV Continuous <Continuous>  dextrose 5%. 1000 milliLiter(s) IV Continuous <Continuous>  dextrose 50% Injectable 25 Gram(s) IV Push once  dextrose 50% Injectable 12.5 Gram(s) IV Push once  dextrose 50% Injectable 25 Gram(s) IV Push once  glucagon  Injectable 1 milliGRAM(s) IntraMuscular once  heparin   Injectable 5000 Unit(s) SubCutaneous every 8 hours  hydrocortisone sodium succinate Injectable 50 milliGRAM(s) IV Push every 8 hours  insulin lispro (ADMELOG) corrective regimen sliding scale   SubCutaneous every 6 hours  insulin NPH human recombinant 12 Unit(s) SubCutaneous <User Schedule>  insulin NPH human recombinant 10 Unit(s) SubCutaneous <User Schedule>  methimazole 20 milliGRAM(s) Oral <User Schedule>  metoclopramide Injectable 10 milliGRAM(s) IV Push every 8 hours  mirtazapine 7.5 milliGRAM(s) Oral daily  multiple electrolytes Injection Type 1 1000 milliLiter(s) IV Continuous <Continuous>  multivitamin 1 Tablet(s) Oral daily  pantoprazole  Injectable 40 milliGRAM(s) IV Push every 12 hours  propranolol 20 milliGRAM(s) Oral every 6 hours  sertraline 100 milliGRAM(s) Oral daily  thiamine 100 milliGRAM(s) Oral daily    PRN Inpatient Medications  ondansetron Injectable 4 milliGRAM(s) IV Push every 6 hours PRN  simethicone 80 milliGRAM(s) Chew every 8 hours PRN        REVIEW OF SYSTEMS  --------------------------------------------------------------------------------    General:  as per hpi  HEENT:  no headaches, no vision changes, no ear pain  CV:  no chest pain, no palpitations  Resp:  see hpi  GI:  as per hpi  :  no pain, no bleeding, no dysuria  Muscle:  no pain, no weakness  Neuro:  no numbness, no tingling, no memory problems  Psych:  no insomnia  Endocrine:  no cold/heat intolerance  Heme: no ecchymosis, no easy bruising  Skin:  no rash, no edema      VITALS/PHYSICAL EXAM  --------------------------------------------------------------------------------  T(C): 35.9 (21 @ 08:00), Max: 36.8 (21 @ 12:00)  HR: 98 (21 @ 09:14) (88 - 134)  BP: --  RR: 19 (21 @ 09:14) (17 - 56)  SpO2: 100% (21 @ 09:14) (92% - 100%)  Wt(kg): --      21 @ 07:01  -  21 @ 07:00  --------------------------------------------------------  IN: 1610 mL / OUT: 1496 mL / NET: 114 mL    21 @ 07:01  -  21 @ 09:23  --------------------------------------------------------  IN: 120 mL / OUT: 200 mL / NET: -80 mL      I&O's Detail    01 Sep 2021 07:01  -  02 Sep 2021 07:00  --------------------------------------------------------  IN:    Enteral Tube Flush: 140 mL    Miscellaneous Tube Feedin mL    multiple electrolytes Injection Type 1.: 720 mL  Total IN: 1610 mL    OUT:    Drain (mL): 445 mL    Emesis (mL): 1 mL    Voided (mL): 1050 mL  Total OUT: 1496 mL    Total NET: 114 mL      02 Sep 2021 07:01  -  02 Sep 2021 09:23  --------------------------------------------------------  IN:    Miscellaneous Tube Feedin mL    multiple electrolytes Injection Type 1.: 60 mL  Total IN: 120 mL    OUT:    Voided (mL): 200 mL  Total OUT: 200 mL    Total NET: -80 mL          Physical Exam:  Gen: lying in bed  HEENT: +trach +ngt   Chest: respirations non labored  Abd: soft  nd +perc c-tube  LE: no edema  Neuro: awake alert appropriate      LABS/STUDIES  --------------------------------------------------------------------------------              8.2    22.87 >-----------<  259      [21 @ 00:41]              26.6     137  |  100  |  21  ----------------------------<  109      [21 @ 00:41]  4.3   |  27  |  0.50        Ca     10.1     [21 00:41]      Mg     2.0     [21 00:41]      Phos  3.7     [21 00:41]    TPro  8.2  /  Alb  3.1  /  TBili  0.5  /  DBili  x   /  AST  59  /  ALT  150  /  AlkPhos  308  [21 @ 00:41]              [21 00:41]    Ca ionizedBlood Gas Arterial - Calcium, Ionized: 1.33 mmol/L (21 @ 00:22)  Blood Gas Arterial - Calcium, Ionized: 1.36 mmol/L (21 @ 00:16)    Creatinine Trend:  POC glucoseGlucose, Serum: 109 mg/dL (21 @ 00:41)  CAPILLARY BLOOD GLUCOSE      POCT Blood Glucose.: 98 mg/dL (02 Sep 2021 06:06)  POCT Blood Glucose.: 122 mg/dL (01 Sep 2021 23:58)  POCT Blood Glucose.: 108 mg/dL (01 Sep 2021 18:04)  POCT Blood Glucose.: 148 mg/dL (01 Sep 2021 12:26)    PrealbuminPrealbumin, Serum: 11 mg/dL (21 @ 04:01)  Prealbumin, Serum: 10 mg/dL (08-15-21 @ 13:53)    Triglycerides     [facility] NUTRITION SUPPORT-- FOLLOW UP NOTE  --------------------------------------------------------------------------------    24 hour events/subjective: pt seen and examined. vomited yesterday afternoon and tf held, later resumed at lower rate. overnight no vomiting per rn and tolerating feeds at 30cc/hr. had loose bm x2. slp eval noted, rec'ing npo. wbc uptrending and hypothermic this am, cxs sent. hr improved. axr noted. pt states abd pain the same. denies nausea and further vomiting.     Diet:  Diet, NPO with Tube Feed:   Tube Feeding Modality: Nasogastric  Vital 1.5 Tank (VITAL1.5RTH)  Total Volume for 24 Hours (mL): 1560  Continuous  Starting Tube Feed Rate mL per Hour: 10  Increase Tube Feed Rate by (mL): 5     Every 24 hours  Until Goal Tube Feed Rate (mL per Hour): 65  Tube Feed Duration (in Hours): 24  Tube Feed Start Time: 11:45  Supplement Feeding Modality:  Nasogastric  Probiotic Yogurt/Smoothie Cans or Servings Per Day:  2       Frequency:  Daily (21 @ 00:07)      PAST HISTORY  --------------------------------------------------------------------------------  No significant changes to PMH, PSH, FHx, SHx, unless otherwise noted    ALLERGIES & MEDICATIONS  --------------------------------------------------------------------------------  Allergies    Amiodarone Hydrochloride (Other (Severe))    Intolerances      Standing Inpatient Medications  dextrose 40% Gel 15 Gram(s) Oral once  dextrose 5%. 1000 milliLiter(s) IV Continuous <Continuous>  dextrose 5%. 1000 milliLiter(s) IV Continuous <Continuous>  dextrose 50% Injectable 25 Gram(s) IV Push once  dextrose 50% Injectable 12.5 Gram(s) IV Push once  dextrose 50% Injectable 25 Gram(s) IV Push once  glucagon  Injectable 1 milliGRAM(s) IntraMuscular once  heparin   Injectable 5000 Unit(s) SubCutaneous every 8 hours  hydrocortisone sodium succinate Injectable 50 milliGRAM(s) IV Push every 8 hours  insulin lispro (ADMELOG) corrective regimen sliding scale   SubCutaneous every 6 hours  insulin NPH human recombinant 12 Unit(s) SubCutaneous <User Schedule>  insulin NPH human recombinant 10 Unit(s) SubCutaneous <User Schedule>  methimazole 20 milliGRAM(s) Oral <User Schedule>  metoclopramide Injectable 10 milliGRAM(s) IV Push every 8 hours  mirtazapine 7.5 milliGRAM(s) Oral daily  multiple electrolytes Injection Type 1 1000 milliLiter(s) IV Continuous <Continuous>  multivitamin 1 Tablet(s) Oral daily  pantoprazole  Injectable 40 milliGRAM(s) IV Push every 12 hours  propranolol 20 milliGRAM(s) Oral every 6 hours  sertraline 100 milliGRAM(s) Oral daily  thiamine 100 milliGRAM(s) Oral daily    PRN Inpatient Medications  ondansetron Injectable 4 milliGRAM(s) IV Push every 6 hours PRN  simethicone 80 milliGRAM(s) Chew every 8 hours PRN        REVIEW OF SYSTEMS  --------------------------------------------------------------------------------    General:  as per hpi  HEENT:  no headaches, no vision changes, no ear pain  CV:  no chest pain, no palpitations  Resp:  see hpi  GI:  as per hpi  :  no pain, no bleeding, no dysuria  Muscle:  no pain, no weakness  Neuro:  no numbness, no tingling, no memory problems  Psych:  no insomnia  Endocrine:  no cold/heat intolerance  Heme: no ecchymosis, no easy bruising  Skin:  no rash, no edema      VITALS/PHYSICAL EXAM  --------------------------------------------------------------------------------  T(C): 35.9 (21 @ 08:00), Max: 36.8 (21 @ 12:00)  HR: 98 (21 @ 09:14) (88 - 134)  BP: --  RR: 19 (21 @ 09:14) (17 - 56)  SpO2: 100% (21 @ 09:14) (92% - 100%)  Wt(kg): --      21 @ 07:01  -  21 @ 07:00  --------------------------------------------------------  IN: 1610 mL / OUT: 1496 mL / NET: 114 mL    21 @ 07:01  -  21 @ 09:23  --------------------------------------------------------  IN: 120 mL / OUT: 200 mL / NET: -80 mL      I&O's Detail    01 Sep 2021 07:01  -  02 Sep 2021 07:00  --------------------------------------------------------  IN:    Enteral Tube Flush: 140 mL    Miscellaneous Tube Feedin mL    multiple electrolytes Injection Type 1.: 720 mL  Total IN: 1610 mL    OUT:    Drain (mL): 445 mL    Emesis (mL): 1 mL    Voided (mL): 1050 mL  Total OUT: 1496 mL    Total NET: 114 mL      02 Sep 2021 07:01  -  02 Sep 2021 09:23  --------------------------------------------------------  IN:    Miscellaneous Tube Feedin mL    multiple electrolytes Injection Type 1.: 60 mL  Total IN: 120 mL    OUT:    Voided (mL): 200 mL  Total OUT: 200 mL    Total NET: -80 mL          Physical Exam:  Gen: lying in bed  HEENT: +trach +ngt   Chest: respirations non labored  Abd: soft  nd +perc c-tube  LE: no edema  Neuro: awake alert appropriate      LABS/STUDIES  --------------------------------------------------------------------------------              8.2    22.87 >-----------<  259      [21 @ 00:41]              26.6     137  |  100  |  21  ----------------------------<  109      [21 @ 00:41]  4.3   |  27  |  0.50        Ca     10.1     [21 00:41]      Mg     2.0     [21 00:41]      Phos  3.7     [21 00:41]    TPro  8.2  /  Alb  3.1  /  TBili  0.5  /  DBili  x   /  AST  59  /  ALT  150  /  AlkPhos  308  [21 @ 00:41]              [21 00:41]    Ca ionizedBlood Gas Arterial - Calcium, Ionized: 1.33 mmol/L (21 @ 00:22)  Blood Gas Arterial - Calcium, Ionized: 1.36 mmol/L (21 @ 00:16)    Creatinine Trend:  POC glucoseGlucose, Serum: 109 mg/dL (21 @ 00:41)  CAPILLARY BLOOD GLUCOSE      POCT Blood Glucose.: 98 mg/dL (02 Sep 2021 06:06)  POCT Blood Glucose.: 122 mg/dL (01 Sep 2021 23:58)  POCT Blood Glucose.: 108 mg/dL (01 Sep 2021 18:04)  POCT Blood Glucose.: 148 mg/dL (01 Sep 2021 12:26)    PrealbuminPrealbumin, Serum: 11 mg/dL (21 @ 04:01)  Prealbumin, Serum: 10 mg/dL (08-15-21 @ 13:53)    Triglycerides

## 2021-09-02 NOTE — PROGRESS NOTE ADULT - ASSESSMENT
65 YO M with a history of stage D NICM s/p HM2 on 9/2017 as DT (due to severe PAD) with TV ring, prior COVID-19 infection 4/2020, recurrent syncope post LVAD s/p ILR, and chronic abdominal pain with prior negative workup who was admitted 6/14/21 with symptomatic anemia with Hgb 4.5 in setting of INR 8.8 without hemodynamic instability and has since had a protracted hospitalization. He was transfused and underwent VCE which showed active bleeding in the mid small bowel but subsequent enteroscopy 6/15 did not reveal any active bleeding. He acutely decompensated after procedure with fever/hypertension, low flow alarms, and pulmonary infiltrate with hypoxia requiring intubation from probable aspiration PNA. He was unable to extubated and has since undergone tracheostomy and while tolerating long periods of trach collar still has persistent secretions preventing ability to decannulate. 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 Short term goal is maintenance of adequate nutrition and eventual trach decannulation.     He now has worsened leukocytosis off antibiotics concerning for recurrent infection

## 2021-09-02 NOTE — PROGRESS NOTE ADULT - ATTENDING COMMENTS
Agree with assessment and plan as above by Dr. Aceves. Reviewed all pertinent labs, glucose values, and imaging studies. Modifications made as indicated above. Too soon to adjust dose of methimazole as he has only received 2 doses. Will need to repeat Free T4 in a few days to assess the dose. Given the likely cause of amiodarone induced hyperthyroidism higher doses of methimazole are not uncommon and may need to be used in this situation depending on LFTs. Continue to monitor glucose on current insulin regimen.    Billy Tipton D.O  [phone]

## 2021-09-02 NOTE — PROGRESS NOTE ADULT - SUBJECTIVE AND OBJECTIVE BOX
INFECTIOUS DISEASES FOLLOW UP-- Anitra Prater  [phone]    This is a follow up note for this  66yMale with  Anemia    Acute cholecystitis    chronic abdominal pain    ROS:  CONSTITUTIONAL:  No fever,  CARDIOVASCULAR:  No chest pain or palpitations  RESPIRATORY:  No dyspnea  GASTROINTESTINAL:  No nausea, vomiting, diarrhea, or abdominal pain  GENITOURINARY:  No dysuria  NEUROLOGIC:  No headache,     Allergies    Amiodarone Hydrochloride (Other (Severe))    Intolerances        ANTIBIOTICS/RELEVANT:  antimicrobials  cefepime   IVPB 1000 milliGRAM(s) IV Intermittent every 8 hours    immunologic:    OTHER:  dextrose 40% Gel 15 Gram(s) Oral once  dextrose 5%. 1000 milliLiter(s) IV Continuous <Continuous>  dextrose 5%. 1000 milliLiter(s) IV Continuous <Continuous>  dextrose 50% Injectable 25 Gram(s) IV Push once  dextrose 50% Injectable 12.5 Gram(s) IV Push once  dextrose 50% Injectable 25 Gram(s) IV Push once  glucagon  Injectable 1 milliGRAM(s) IntraMuscular once  heparin   Injectable 5000 Unit(s) SubCutaneous every 8 hours  hydrocortisone sodium succinate Injectable 50 milliGRAM(s) IV Push every 8 hours  insulin lispro (ADMELOG) corrective regimen sliding scale   SubCutaneous every 6 hours  insulin NPH human recombinant 12 Unit(s) SubCutaneous <User Schedule>  insulin NPH human recombinant 10 Unit(s) SubCutaneous <User Schedule>  methimazole 20 milliGRAM(s) Oral <User Schedule>  metoclopramide Injectable 10 milliGRAM(s) IV Push every 8 hours  mirtazapine 7.5 milliGRAM(s) Oral daily  multiple electrolytes Injection Type 1 1000 milliLiter(s) IV Continuous <Continuous>  multivitamin 1 Tablet(s) Oral daily  ondansetron Injectable 4 milliGRAM(s) IV Push every 6 hours PRN  pantoprazole  Injectable 40 milliGRAM(s) IV Push every 12 hours  propranolol 40 milliGRAM(s) Oral every 6 hours  sertraline 100 milliGRAM(s) Oral daily  simethicone 80 milliGRAM(s) Chew every 8 hours PRN  thiamine 100 milliGRAM(s) Oral daily      Objective:  Vital Signs Last 24 Hrs  T(C): 35.9 (02 Sep 2021 12:00), Max: 36.1 (01 Sep 2021 20:00)  T(F): 96.6 (02 Sep 2021 12:00), Max: 97 (01 Sep 2021 20:00)  HR: 104 (02 Sep 2021 18:00) (88 - 130)  BP: --  BP(mean): --  RR: 5 (02 Sep 2021 18:00) (5 - 56)  SpO2: 99% (02 Sep 2021 18:00) (84% - 100%)    PHYSICAL EXAM:  Constitutional:no acute distress  Eyes:ALONSO, EOMI  Ear/Nose/Throat: no oral lesions, 	  Respiratory: clear BL  Cardiovascular: S1S2  Gastrointestinal: cholecystotomy tube, LVAD exit site dressing CDI  Extremities:no e/e/c  No Lymphadenopathy  IV sites not inflammed.    LABS:                        8.2    22.87 )-----------( 259      ( 02 Sep 2021 00:41 )             26.6     09-02    137  |  100  |  21  ----------------------------<  109<H>  4.3   |  27  |  0.50    Ca    10.1      02 Sep 2021 00:41  Phos  3.7     09-02  Mg     2.0     09-02    TPro  8.2  /  Alb  3.1<L>  /  TBili  0.5  /  DBili  x   /  AST  59<H>  /  ALT  150<H>  /  AlkPhos  308<H>  09-02          MICROBIOLOGY:            RECENT CULTURES:  08-27 @ 14:30  .Body Fluid Cholecystostomy drain  --  --  --    No growth at 5 days  --  08-27 @ 03:23  .Blood Blood-Peripheral  --  --  --    No Growth Final  --  08-26 @ 22:58  .Blood Blood  --  --  --    No Growth Final  --  08-26 @ 22:56  .Blood Blood  --  --  --    No Growth Final  --      RADIOLOGY & ADDITIONAL STUDIES:    < from: Xray Chest 1 View- PORTABLE-Urgent (Xray Chest 1 View- PORTABLE-Urgent .) (09.02.21 @ 15:28) >  INTERPRETATION:  enteric tube tip is in the stomach.    < end of copied text >

## 2021-09-02 NOTE — PROGRESS NOTE ADULT - ASSESSMENT
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 coli UTIs, driveline drainage (1/7/21) and COVID-19, here initially for GIB prolonged hospital course, s/p tracheostomy, acalculous cholecystitis s/p perc dawn. Patient has persistent intermittent abdominal pain. Per CTU team, pt has recently been refusing tube feeds and is being evaluated for chronic mesenteric ischemia vs SMA syndrome.  8/13 bld cxs positive. Consulted for TPN. Prealb 11, prior a1c 5.6, tg 141. 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 Tolerating feeds but with intermittent abd pain. SLP eval recommending npo status.    -cont tube feeds- Vital 1.5 and advance to goal as tolerated (goal of 65 ml/hr) as per CTU team, no present plan to initiate TPN, will reassess need pending clinical course  -suspect uncontrolled hyperthyroidism contributing to metabolic issues ie. gi motility, hypercalcemia, tachycardia; further management as per endocrine  -h/o abdominal pain/vomiting- as above, zofran prn for nausea; reglan for gut motility  -hypothermia/leukocytosis- restarted abx, f/u cxs, care per ID  -anemia- rec iron supp when feasible  -psych, palliative care, and ethics input noted  -management as per CTU; will remain available as needed    plan discussed with Martínez, agreeable with above    TPN pager [phone], spectra [phone]  M-F 6A-4P, Weekends and holidays 8A-12P

## 2021-09-02 NOTE — PROGRESS NOTE ADULT - ASSESSMENT
65 year old male with history of Stage D HF due to NICM on LVAD,  TV annuloplasty ring 9/12/17 as destination therapy due to severe peripheral artery disease with significant stenosis  SIADH, Depression, CKD-3 with hyperkalemia, admitted 6/14/21 with symptomatic anemia with Hgb 4.5 in setting of INR 8.8, thereafter with complicated hospital course including VAP, serratia bacteremia with acalculous cholecystitis s/p percutaneous tube, abdominal pain with unclear source other than possible mesenteric ischemia (from possible SMA stenosis? 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 Nearly two months later, on 8/7 - TSH was < 0.01  Likely 2/2 amiodarone use, exacerbated by IV contrast imaging (7/28 and 8/13). Toxic nodule is possible. Less likely Graves (negative TSH receptor Ab and TSI). 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   TPO Ab noted to be elevated at 49.9. Pt has hx of elevated TSH in 2017 (TSH 4.92)  -Avoid any tests with IV contrast  -F/u TSH, FT4, and TT3 (in lab). Will adjust methimazole accordingly.   -For now, continue Methimazole 20 mg BID (8 AM and 8 PM). Methimazole is more likely to cause biliary dysfunction (PTU more likely to cause hepatocellular dysfunction). Alk phos, AST and ALT are elevated, but etiologies can be multifactorial. Will downtitrate Methimazole as tolerated.   -Watch for sudden changes in LFTs or biliary labs  -Continue Propranolol 40 mg q hrs (need 160 mg/day to dec peripheral conversion of T4 to T3). Goal HR 60-80. Above goal, but tx of underlying critical illness will also help   -Continue Hydrocortisone 50 mg IV q 8 hrs (steroids used in treatment of Type 2 Amiodarone induced thyroiditis)   In some cases of refractory amio induced thyrotoxicocis, total thyroidectomy has been recommended however the pt is likely a poor surgical candidate given his multiple comorbidities     #Steroid induced hyperglycemia   HbA1c 5.4%  -can c/w NPH 12 units sq at 6am and continue 10 units at 12am, 12pm, 6pm.  -continue moderate scale sq q6 if on tube feeds  -if no tube feeds are on, low dose ISS q6hrs and hold NPH  -BS testing q6    #Amiodarone Use  Likely contributing to thyrotoxicosis as noted above  Amiodarone stopped 8/7   -Tx as above     Sia Aceves MD  Endocrine Fellow  Pager: [phone]/TIMBO [phone]  Consults 9am-5pm: [phone]  After 5pm and weekends: [phone]       65 year old male with history of Stage D HF due to NICM on LVAD,  TV annuloplasty ring 9/12/17 as destination therapy due to severe peripheral artery disease with significant stenosis  SIADH, Depression, CKD-3 with hyperkalemia, admitted 6/14/21 with symptomatic anemia with Hgb 4.5 in setting of INR 8.8, thereafter with complicated hospital course including VAP, serratia bacteremia with acalculous cholecystitis s/p percutaneous tube, abdominal pain with unclear source other than possible mesenteric ischemia (from possible SMA stenosis? 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 Nearly two months later, on 8/7 - TSH was < 0.01  Likely 2/2 amiodarone use, exacerbated by IV contrast imaging (7/28 and 8/13). Toxic nodule is possible. Less likely Graves (negative TSH receptor Ab and TSI, but TPO Ab positive at 49.9).   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     Recs  -Avoid any tests with IV contrast  -F/u TSH, FT4, and TT3 (in lab). Will adjust methimazole accordingly.   -For now, continue Methimazole 20 mg BID (8 AM and 8 PM). Methimazole is more likely to cause biliary dysfunction (PTU more likely to cause hepatocellular dysfunction). Alk phos, AST and ALT are elevated, but etiologies can be multifactorial. Will downtitrate Methimazole as tolerated.   -Watch for sudden changes in LFTs or biliary labs  -Continue Propranolol 40 mg q hrs (need 160 mg/day to dec peripheral conversion of T4 to T3). Goal HR 60-80. Above goal, but tx of underlying critical illness will also help   -Continue Hydrocortisone 50 mg IV q 8 hrs (steroids used in treatment of Type 2 Amiodarone induced thyroiditis)   In some cases of refractory amio induced thyrotoxicocis, total thyroidectomy has been recommended however the pt is likely a poor surgical candidate given his multiple comorbidities     #Steroid induced hyperglycemia   HbA1c 5.4%  -can c/w NPH 12 units sq at 6am and continue 10 units at 12am, 12pm, 6pm.  -continue moderate scale sq q6 if on tube feeds  -if no tube feeds are on, low dose ISS q6hrs and hold NPH  -BS testing q6    #Amiodarone Use  Likely contributing to thyrotoxicosis as noted above  Amiodarone stopped 8/7   -Tx as above     Sia Aceves MD  Endocrine Fellow  Pager: [phone]/TIMBO [phone]  Consults 9am-5pm: [phone]  After 5pm and weekends: [phone]       65 year old male with history of Stage D HF due to NICM on LVAD,  TV annuloplasty ring 9/12/17 as destination therapy due to severe peripheral artery disease with significant stenosis  SIADH, Depression, CKD-3 with hyperkalemia, admitted 6/14/21 with symptomatic anemia with Hgb 4.5 in setting of INR 8.8, thereafter with complicated hospital course including VAP, serratia bacteremia with acalculous cholecystitis s/p percutaneous tube, abdominal pain with unclear source other than possible mesenteric ischemia (from possible SMA stenosis? 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 Nearly two months later, on 8/7 - TSH was < 0.01  Likely 2/2 amiodarone use, exacerbated by IV contrast imaging (7/28 and 8/13). Toxic nodule is possible. Less likely Graves (negative TSH receptor Ab and TSI, but TPO Ab positive at 49.9).   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     Recs  -Avoid any tests with IV contrast  -F/u TSH, FT4, and TT3 (in lab).   -For now, continue Methimazole 20 mg BID (8 AM and 8 PM).  -It has been about 48 hrs since this dose was started. Will need to reassess TFTs on 9/4 and adjust Methimazole accordingly  -Methimazole is more likely to cause biliary dysfunction (PTU more likely to cause hepatocellular dysfunction). Alk phos, AST and ALT are elevated, but etiologies can be multifactorial. Watch for sudden changes in LFTs or biliary labs  -Continue Propranolol 40 mg q hrs (need 160 mg/day to dec peripheral conversion of T4 to T3). Goal HR 60-80. Above goal, but tx of underlying critical illness will also help   -Continue Hydrocortisone 50 mg IV q 8 hrs (steroids used in treatment of Type 2 Amiodarone induced thyroiditis)   In some cases of refractory amio induced thyrotoxicocis, total thyroidectomy has been recommended however the pt is likely a poor surgical candidate given his multiple comorbidities     #Steroid induced hyperglycemia   HbA1c 5.4%  -can c/w NPH 12 units sq at 6am and continue 10 units at 12am, 12pm, 6pm.  -continue moderate scale sq q6 if on tube feeds  -if no tube feeds are on, low dose ISS q6hrs and hold NPH  -BS testing q6    #Amiodarone Use  Likely contributing to thyrotoxicosis as noted above  Amiodarone stopped 8/7   -Tx as above     Discussed with Dr. Tipton and primary team     Sia Acvees MD  Endocrine Fellow  Pager: [phone]/TIMBO [phone]  Consults 9am-5pm: [phone]  After 5pm and weekends: [phone]       65 year old male with history of Stage D HF due to NICM on LVAD,  TV annuloplasty ring 9/12/17 as destination therapy due to severe peripheral artery disease with significant stenosis  SIADH, Depression, CKD-3 with hyperkalemia, admitted 6/14/21 with symptomatic anemia with Hgb 4.5 in setting of INR 8.8, thereafter with complicated hospital course including VAP, serratia bacteremia with acalculous cholecystitis s/p percutaneous tube, abdominal pain with unclear source other than possible mesenteric ischemia (from possible SMA stenosis? 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 Nearly two months later, on 8/7 - TSH was < 0.01  Likely 2/2 amiodarone use, exacerbated by IV contrast imaging (7/28 and 8/13). Toxic nodule is possible. Less likely Graves (negative TSH receptor Ab and TSI, but TPO Ab positive at 49.9).   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     Recs  -Avoid any tests with IV contrast  -F/u TSH, FT4, and TT3 (in lab).   -For now, continue Methimazole 20 mg BID (8 AM and 8 PM).  -It has been about 48 hrs since this dose was started. Will need to reassess TFTs on 9/4 and adjust Methimazole accordingly  -Methimazole is more likely to cause biliary dysfunction (PTU more likely to cause hepatocellular dysfunction). Alk phos, AST and ALT are elevated, but etiologies can be multifactorial. Watch for sudden changes in LFTs or biliary labs  -Continue Propranolol 40 mg q 6 hrs (need 160 mg/day to dec peripheral conversion of T4 to T3). Goal HR 60-80. Above goal, but tx of underlying critical illness will also help   -Continue Hydrocortisone 50 mg IV q 8 hrs (steroids used in treatment of Type 2 Amiodarone induced thyroiditis)   In some cases of refractory amio induced thyrotoxicocis, total thyroidectomy has been recommended however the pt is likely a poor surgical candidate given his multiple comorbidities     #Steroid induced hyperglycemia   HbA1c 5.4%  -can c/w NPH 12 units sq at 6am and continue 10 units at 12am, 12pm, 6pm.  -continue moderate scale sq q6 if on tube feeds  -if no tube feeds are on, low dose ISS q6hrs and hold NPH  -BS testing q6    #Amiodarone Use  Likely contributing to thyrotoxicosis as noted above  Amiodarone stopped 8/7   -Tx as above     Discussed with Dr. Tipton and primary team     Sia Aceves MD  Endocrine Fellow  Pager: [phone]/TIMBO [phone]  Consults 9am-5pm: [phone]  After 5pm and weekends: [phone]

## 2021-09-02 NOTE — PROGRESS NOTE ADULT - PROBLEM SELECTOR PLAN 7
- endocrine recs appreciated  - currently on methimazole, propranolol, and steroids  - recheck thyroid function studies given hypothermia

## 2021-09-02 NOTE — PROGRESS NOTE ADULT - SUBJECTIVE AND OBJECTIVE BOX
Admitted for:    Following for:    Subjective:       MEDICATIONS  (STANDING):  cefepime   IVPB 1000 milliGRAM(s) IV Intermittent every 8 hours  dextrose 40% Gel 15 Gram(s) Oral once  dextrose 5%. 1000 milliLiter(s) (50 mL/Hr) IV Continuous <Continuous>  dextrose 5%. 1000 milliLiter(s) (100 mL/Hr) IV Continuous <Continuous>  dextrose 50% Injectable 25 Gram(s) IV Push once  dextrose 50% Injectable 12.5 Gram(s) IV Push once  dextrose 50% Injectable 25 Gram(s) IV Push once  glucagon  Injectable 1 milliGRAM(s) IntraMuscular once  heparin   Injectable 5000 Unit(s) SubCutaneous every 8 hours  hydrocortisone sodium succinate Injectable 50 milliGRAM(s) IV Push every 8 hours  insulin lispro (ADMELOG) corrective regimen sliding scale   SubCutaneous every 6 hours  insulin NPH human recombinant 12 Unit(s) SubCutaneous <User Schedule>  insulin NPH human recombinant 10 Unit(s) SubCutaneous <User Schedule>  methimazole 20 milliGRAM(s) Oral <User Schedule>  metoclopramide Injectable 10 milliGRAM(s) IV Push every 8 hours  mirtazapine 7.5 milliGRAM(s) Oral daily  multiple electrolytes Injection Type 1 1000 milliLiter(s) (30 mL/Hr) IV Continuous <Continuous>  multivitamin 1 Tablet(s) Oral daily  pantoprazole  Injectable 40 milliGRAM(s) IV Push every 12 hours  propranolol 40 milliGRAM(s) Oral every 6 hours  sertraline 100 milliGRAM(s) Oral daily  thiamine 100 milliGRAM(s) Oral daily    MEDICATIONS  (PRN):  ondansetron Injectable 4 milliGRAM(s) IV Push every 6 hours PRN Nausea and/or Vomiting  simethicone 80 milliGRAM(s) Chew every 8 hours PRN Gas      Allergies    Amiodarone Hydrochloride (Other (Severe))    Intolerances        Review of Systems:  Constitutional: No fever  Eyes: No blurry vision  Neuro: No tremors  HEENT: No pain  Cardiovascular: No chest pain, palpitations  Respiratory: No SOB, no cough  GI: No nausea, vomiting, abdominal pain  : No dysuria  Skin: no rash  Psych: no depression  Endocrine: no polyuria, polydipsia  Hem/lymph: no swelling  Osteoporosis: no fractures    PHYSICAL EXAM:  VITALS: T(C): 35.9 (09-02-21 @ 08:00)  T(F): 96.6 (09-02-21 @ 08:00), Max: 97.3 (09-01-21 @ 18:00)  HR: 110 (09-02-21 @ 14:00) (88 - 132)  BP: --  RR:  (17 - 56)  SpO2:  (84% - 100%)  Wt(kg): --  GENERAL: NAD  EYES: No proptosis, no lid lag, anicteric  HEENT:  Atraumatic  THYROID: Normal size, no palpable nodules  RESPIRATORY: Clear to auscultation bilaterally  CARDIOVASCULAR: Regular rate and rhythm; No murmurs; no peripheral edema  GI: Soft, nontender, non distended, normal bowel sounds  SKIN: Dry  PSYCH: Alert and oriented x 3, flat affect      A1C with Estimated Average Glucose Result: 5.4 % (08-15-21 @ 13:52)  A1C with Estimated Average Glucose Result: 5.6 % (06-15-21 @ 02:42)      POCT Blood Glucose.: 113 mg/dL (09-02-21 @ 13:59)  POCT Blood Glucose.: 98 mg/dL (09-02-21 @ 06:06)  POCT Blood Glucose.: 122 mg/dL (09-01-21 @ 23:58)  POCT Blood Glucose.: 108 mg/dL (09-01-21 @ 18:04)  POCT Blood Glucose.: 148 mg/dL (09-01-21 @ 12:26)  POCT Blood Glucose.: 162 mg/dL (09-01-21 @ 06:06)  POCT Blood Glucose.: 131 mg/dL (08-31-21 @ 17:38)  POCT Blood Glucose.: 202 mg/dL (08-31-21 @ 12:57)  POCT Blood Glucose.: 178 mg/dL (08-31-21 @ 05:31)  POCT Blood Glucose.: 183 mg/dL (08-30-21 @ 17:21)      09-02    137  |  100  |  21  ----------------------------<  109<H>  4.3   |  27  |  0.50    EGFR if : 132  EGFR if non : 114    Ca    10.1      09-02  Mg     2.0     09-02  Phos  3.7     09-02    TPro  8.2  /  Alb  3.1<L>  /  TBili  0.5  /  DBili  x   /  AST  59<H>  /  ALT  150<H>  /  AlkPhos  308<H>  09-02      Thyroid Function Tests:  08-31 @ 00:21 TSH -- FreeT4 -- T3 -- Anti TPO 49.9 Anti Thyroglobulin Ab -- TSI --  08-30 @ 10:10 TSH -- FreeT4 >7.8 T3 -- Anti TPO -- Anti Thyroglobulin Ab -- TSI --                         Admitted for: Anemia and bacteremia    Following for: Hyperthyroidism and Steroid induced hyperglycemia    Subjective: Patient feeling average. Has no specific complaints.       MEDICATIONS  (STANDING):  cefepime   IVPB 1000 milliGRAM(s) IV Intermittent every 8 hours  dextrose 40% Gel 15 Gram(s) Oral once  dextrose 5%. 1000 milliLiter(s) (50 mL/Hr) IV Continuous <Continuous>  dextrose 5%. 1000 milliLiter(s) (100 mL/Hr) IV Continuous <Continuous>  dextrose 50% Injectable 25 Gram(s) IV Push once  dextrose 50% Injectable 12.5 Gram(s) IV Push once  dextrose 50% Injectable 25 Gram(s) IV Push once  glucagon  Injectable 1 milliGRAM(s) IntraMuscular once  heparin   Injectable 5000 Unit(s) SubCutaneous every 8 hours  hydrocortisone sodium succinate Injectable 50 milliGRAM(s) IV Push every 8 hours  insulin lispro (ADMELOG) corrective regimen sliding scale   SubCutaneous every 6 hours  insulin NPH human recombinant 12 Unit(s) SubCutaneous <User Schedule>  insulin NPH human recombinant 10 Unit(s) SubCutaneous <User Schedule>  methimazole 20 milliGRAM(s) Oral <User Schedule>  metoclopramide Injectable 10 milliGRAM(s) IV Push every 8 hours  mirtazapine 7.5 milliGRAM(s) Oral daily  multiple electrolytes Injection Type 1 1000 milliLiter(s) (30 mL/Hr) IV Continuous <Continuous>  multivitamin 1 Tablet(s) Oral daily  pantoprazole  Injectable 40 milliGRAM(s) IV Push every 12 hours  propranolol 40 milliGRAM(s) Oral every 6 hours  sertraline 100 milliGRAM(s) Oral daily  thiamine 100 milliGRAM(s) Oral daily    MEDICATIONS  (PRN):  ondansetron Injectable 4 milliGRAM(s) IV Push every 6 hours PRN Nausea and/or Vomiting  simethicone 80 milliGRAM(s) Chew every 8 hours PRN Gas      Allergies    Amiodarone Hydrochloride (Other (Severe))    Intolerances      PHYSICAL EXAM:  VITALS: T(C): 35.9 (09-02-21 @ 08:00)  T(F): 96.6 (09-02-21 @ 08:00), Max: 97.3 (09-01-21 @ 18:00)  HR: 110 (09-02-21 @ 14:00) (88 - 132)  BP: --  RR:  (17 - 56)  SpO2:  (84% - 100%)  Wt(kg): --  GENERAL: NAD, thin, NGT in place  EYES: No proptosis, no lid lag, anicteric  HEENT:  Atraumatic  THYROID: Normal size, no palpable nodules  RESPIRATORY: Clear to auscultation bilaterally  CARDIOVASCULAR: Regular rate and rhythm; No murmurs; no peripheral edema  GI: Soft, slightly tender to palpation  SKIN: Dry  PSYCH: Alert and oriented x 3, flat affect      A1C with Estimated Average Glucose Result: 5.4 % (08-15-21 @ 13:52)  A1C with Estimated Average Glucose Result: 5.6 % (06-15-21 @ 02:42)      POCT Blood Glucose.: 113 mg/dL (09-02-21 @ 13:59)  POCT Blood Glucose.: 98 mg/dL (09-02-21 @ 06:06)  POCT Blood Glucose.: 122 mg/dL (09-01-21 @ 23:58)  POCT Blood Glucose.: 108 mg/dL (09-01-21 @ 18:04)  POCT Blood Glucose.: 148 mg/dL (09-01-21 @ 12:26)  POCT Blood Glucose.: 162 mg/dL (09-01-21 @ 06:06)  POCT Blood Glucose.: 131 mg/dL (08-31-21 @ 17:38)  POCT Blood Glucose.: 202 mg/dL (08-31-21 @ 12:57)  POCT Blood Glucose.: 178 mg/dL (08-31-21 @ 05:31)  POCT Blood Glucose.: 183 mg/dL (08-30-21 @ 17:21)      09-02    137  |  100  |  21  ----------------------------<  109<H>  4.3   |  27  |  0.50    EGFR if : 132  EGFR if non : 114    Ca    10.1      09-02  Mg     2.0     09-02  Phos  3.7     09-02    TPro  8.2  /  Alb  3.1<L>  /  TBili  0.5  /  DBili  x   /  AST  59<H>  /  ALT  150<H>  /  AlkPhos  308<H>  09-02      Thyroid Function Tests:  08-31 @ 00:21 TSH -- FreeT4 -- T3 -- Anti TPO 49.9 Anti Thyroglobulin Ab -- TSI --  08-30 @ 10:10 TSH -- FreeT4 >7.8 T3 -- Anti TPO -- Anti Thyroglobulin Ab -- TSI --

## 2021-09-02 NOTE — PROGRESS NOTE ADULT - ASSESSMENT
Seen sitting in bed on CTU with Trach collar in place. Communication improved. Has been working with Speech therapy. Communication augmented with hand gestures, and nodding yes/no. Reports his mood is down with periods of frustration in setting of extended and complex hospitalization. Ambulated in hallway with PT. Reports his legs feel tired after ambulating. Continues to endorse abdominal pain in area of driveline. Went outside earlier in week which he enjoyed. Poor sleep most nights this week. Takes Klonapin at bedtime. Psych following patient. Denies anhedonia. Denies SI/HI. Receptive to support, validation.     Dx: Adjustment disorder with anxiety and depressed mood. F43.20 Systolic heart failure I50.2  LVAD Z95.811    Recommendations:   To promote sleep, keep lights and noise level to minimum during night  Engage with patient as often as possible throughout day to encourage communication  Behavioral Cardiology will follow      55 minutes spent on total patient encounter    Jessica Hennessy, PhD  [phone]

## 2021-09-02 NOTE — PROGRESS NOTE ADULT - PROBLEM SELECTOR PLAN 4
- serratia bacteremia and poss acalculous cholecystitis. B/c with gram + cocci and many enterobacter Carbapenem resistant strain and now s/p per dawn drain  - also with enterobacter from sputum culture 8/13  - will need eventual consideration for cholecystostomy when optimized  - repeat pan-culture given leukocytosis and restart cefepime pending culture data

## 2021-09-02 NOTE — CHART NOTE - NSCHARTNOTEFT_GEN_A_CORE
Pt seen at bedside for speaking valve trial and repeat swallow evaluation. Pt awake and alert, following directions, generally calm and cooperative for session. Pt with Portex 6 trach, cuff deflated, on FiO2 40% via TC. Pt with baseline VSS notable for RR 30s, O2 sat 100, HR 130s. PMV was placed on hub of trach, with VSS notable for O2 sat  HR 130s. RR initially remained unchanged, but with cues to breathe in through nose and out through pursed lip, Pt's rate gradually slowed to the 20s and remained there during subsequent conversation.  Pt appears increasingly stimulable for slowing RR, supporting suspected component of anxiety/ agitation influencing RR. Pt with a coughing episode, expectorated some secretions, but requesting suction as unable to clear the rest, RR karthik to 40s-50s. Respiratory Tx came to suction Pt. Pt's vocal quality clear most of the session, with occasional wet/gurgly quality, with secretion management that appears somewhat reduced compared to yesterday. RN reports that Pt has required endotracheal suctioning three times prior to session today.    Pt was administered limited PO trials of small ice chips. Pt presents with evidence of an oropharyngeal dysphagia notable for prolonged oral phase, suspected delay in pharyngeal swallow trigger, suspected reduced hyolaryngeal elevation/excursion, but no overt s/s laryngeal penetration/aspiration were observed today. Eventually, Pt wore PMV x 25 mins until coughing episode, and request for suctioning. PMV replaced after suctioning, appeared to become tired, and so PMV removed after 10 more minutes. Purposeful proactive rounding reinforced and 5 Ps addressed. Pt left in no distress.     Pt with gradually improving tolerance of PMV trials; reduced secretion management compared to yesterday.  Will continue to follow for dysphagia therapy and PMV trials. Pt would benefit from additional time and therapy prior to repeat assessment.  Findings d/w covering RN and Pt.    RECOMMENDATIONS  1) Continue NPO, with non-oral nutrition/hydration/medications.   2) Strict aspiration and reflux precautions for secretions and enteral feeds.  3) Maintain good oral hygiene.   4) Restorative swallow therapy, and PMV trials with SLP only. Will continue to follow while patient is in-house, as schedule permits.   5) d/c tbd pending hospital course.    Gege Hernandez MA, CCC-SLP  Speech-Language Pathologist  pager #[phone]

## 2021-09-02 NOTE — PROGRESS NOTE ADULT - ASSESSMENT
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 coli UTIs, driveline drainage (1/7/21) and COVID-19 (back in April 2020)  He was recently seen in clinic where he complained of abdominal pain and dark stools w constipation back in May. He presents to [facility] ER today weakness and fatigue, moderate and + Black stools for three days, on coumadin secondary to warfarin use in the setting of an LVAD. Patient has required transfusions for GIB in the past. Mostly recently back in jan 2021 pt had anemia with dark stools. No interventions was done at that time. However Last Endoscopy was done in July 2020 (negative). Today labs show patient is anemic with H/H of 4.5/16.3,. INR is 8.84 MAP in the 90s,   Returned from endoscopy appearing ill tachypneic with chills with new white out of his left lung    Remains with complaints of abdominal pain  afebrile and appears stable  WBC increased in the setting of steroids- patient restarted on Cefepime by proimary team  would favor monitoring off antimicrobials unless clinical condition changes        Morris Prater MD  [phone]  After 5pm/weekends [phone]

## 2021-09-02 NOTE — PROGRESS NOTE ADULT - SUBJECTIVE AND OBJECTIVE BOX
Behavioral Cardiology Progress Note     History of present illness: Mr. Quispe is a 66 year-old man with history of NICM s/p HM2 on 9/2017 as DT (due to severe PAD) with TV ring, prior COVID-19 infection 4/2020, recurrent syncope post LVAD s/p ILR, and chronic abdominal pain with prior negative workup who was admitted with symptomatic anemia with Hgb 4.5 in setting of INR 8.8 without hemodynamic instability. Testing showed active bleeding in the small bowel but subsequent enteroscopy 6/15 did not reveal any active bleeding. He acutely decompensated after procedure with fever/hypertension, low flow alarms, and pulmonary infiltrate with hypoxia requiring intubation from probable aspiration PNA.  Course notable for inability to wean ventilator with persistent secretions for which he underwent tracheostomy as well as acute c diff colitis.     Social history: , lives in his sister’s house in [place]. Has 3 sons (2 live in , 1 in [place]). One of 3 siblings. Lives in his sister’s house in [place] (Cristina Rudolph [phone]), also in the home are niece ([facility] [phone]) and nephew (Miller Rudolph).  Another sister lives close by in [place] and all other siblings live in [place] which the family visit often.  Worked full time at [employer] in [place], stopped working due to heart disease. Education: high school graduate.     Substance use:   Tobacco: Former smoker, 8-10 cigarettes/day for 30 years.   Alcohol: Past use of 3-4 drinks of Hen[place]sey 2x/week. None for past 4 years.   Drug: Denies any drug use.     Past psychiatric history: Denies     Mental status exam: Seen sitting in bed with Trach collar. Awake, alert, oriented x3. Thought process goal oriented. No evidence of any psychosis, kenan, delusions. No SI/HI. Mood down, frustrated. Affect constricted. Denies SI/HI. Insight and judgment adequate.

## 2021-09-02 NOTE — PROGRESS NOTE ADULT - PROBLEM SELECTOR PLAN 3
- Chronic in nature with questionable SMA stenosis on imaging, likely also related in part to cholecystitis now treated  - Continue to advance feeds as tolerates

## 2021-09-02 NOTE — PROGRESS NOTE ADULT - SUBJECTIVE AND OBJECTIVE BOX
RICKY JOINT  MRN#: [id number]  Subjective:  Pulmonary progress  : recurrent Acute hypoxic respiratory Failure ,aspiration pneumonia, NICM  , chart reviewed and H/O obtained radiological and Laboratory study reviewed patient Examined     65M PMH ACC/AHA stage D HF due to NICM HM2 LVAD , TV annuloplasty ring 17 as destination therapy due to severe peripheral artery disease with significant stenosis  SIADH, Depression, CKD-3 with hyperkalemia, past E. coli UTIs, driveline drainage (21) and COVID-19 (back in 2020)  He was recently seen in clinic where he complained of abdominal pain and dark stools w constipation back in May. He presents to [facility] ER today weakness and fatigue, moderate and + Black stools for three days, on coumadin secondary to warfarin use in the setting of an LVAD. Patient has required transfusions for GIB in the past. Mostly recently back in 2021 pt had anemia with dark stools. No interventions was done at that time. However Last Endoscopy was done in 2020 (negative). Today labs show patient is anemic with H/H of 4.5/16.3,. INR is 8.84 MAP in the 90s, Temp 35.1. He denies any chest pain, shortness of breath, dizziness, abd pain, nausea or vomiting. found to have  rectal bleeding underwent endoscopy ,old blood in the proximal ileum ,  develop sepsis with LL opacity given Antibiotics , Extubated , reintubated , Bronchoscopy on Zosyn for LL pneumonia  and Amiodarone S/P TV Annuloplasty , patient remain intubated on full ventilatory support .S/P multiple units of blood transfusion , remain on full ventilatory support on Precedex and propofol , new central IJ line , diarrhea C diff. +ve on po vancomycin and IV Flagyl,  mildly distended belly , fever start on cefepime 2gm q 8 hrs S/P tracheostomy .  new RT Subclavian central line continue on contact  isolation ,still diarrhea on C-diff antibiotics ENT follow up appreciated , trial of C-PAP as tolerated , , copious secretion from trach. site chest x ray left lower lobe pneumonia , tolerating trch. collar 50% FI02 still excessive secretion need pulmonary toilet , off Ancef antibiotic , no more diarrhea back on full support mechanical ventilator , chest x ray show improvement in LLL air space disease, more awake and responsive on tube feeding no more diarrhea , S/P  Ancef for bacteremia no fever ,ID follow up noted ,  no nausea or vomiting or diarrhea still very weak and tired , event note pulled NG tube now replaced , back on tube feeding ,still on po vancomycin , getting PT and OT at bed side , no plan for decannulation for now. , no more diarrhea receiving PT and OPT at bed side , minimal secretion from tracheostomy site , no SOB getting stronger , improve muscle tone patient transfer to monitor bed still on contact isolation for C-Difficel colitis on 50% FI02, NG tube clogged , and change to resume tube feeding still loose stool . H/H drop significantly require blood transfusion , most likely GI bleeding , IV heparin D/C , vomiting 200 cc of creamy color tube feeding on hold no sob, still melena monitor in the CTU possible capsule endoscopy , H/H is stable ., patient develop TR sided  pneumothorax require chest tube placement , RT IJ central line  placed , develop fever shaking chills , blood culture positive for serratia marcescens , start on cefepime .the patient  become hypoxic and hypotensive placed on full ventilatory support and Vasopressin , levophed and Dobutamine ,S/P blood transfusion on meropenem and vancomycin ,   , on and  off pressors , occasional agitation on Precedex .S/P IR cholecystostomy tube drainage placement in the RT upper Quadrant , resume anticoagulation chest x ray noted C-PAP trail lasted only for 2 hrs , new RT SC line and D/C RT IJ line , RT pig tail cathter has been removed , tolerating C-PAP trial placed on trach. collar 50% FI02 GI consultant noted on NG tube feeding as tolerated , develop AF RVR S/P  bolus Amiodarone  back to regular sinus Rhythm , Flat Affect depressed , back on tube feeding Vital AF at 60 cc/ hr .still intermittent abdominal pain , no fever saturation is accepted  back on full ventilatory support , tired and sleepy on round  .start  back on  tube feeding . tolerating it very well , no nausea or vomiting , good 02 saturation on trac-collar on methimazole for hyperthyroidism , no new C/O no plan for decannulation yet , electrolyte blood test is still pending .on respiratory isolation sputum culture is negative , increase WBC  improved on cefepime , sputum positive for enterococcus , condition is the same , white count is improving , no chest pain or SOB , speech and swallow  evaluation appreciated , recommend continue NG tube feeding        (2021 16:57)    PAST MEDICAL & SURGICAL HISTORY:  CHF (congestive heart failure)    CAD (coronary artery disease)  Depression    Pleural effusion    History of 2019 novel coronavirus disease (COVID-19)  2020    Hemorrhoids    Bleeding hemorrhoids    Peripheral arterial disease    Claudication    BPH with urinary obstruction    ACC/AHA stage D systolic heart failure  Anticoagulation goal of INR 2.0 to 2.5    Falls    Clavicle fracture    CKD (chronic kidney disease), stage III    Iron deficiency anemia    H/O epistaxis    Vertigo    GI bleed    S/P TVR (tricuspid valve repair)    S/P ventricular assist device    S/P endoscopy    OBJECTIVE:  ICU Vital Signs Last 24 Hrs  T(C): 38.2 (2021 10:00), Max: 38.5 (2021 12:00)  T(F): 100.8 (2021 10:00), Max: 101.3 (2021 12:00)  HR: 65 (2021 10:00) (61 - 69)  BP: --  BP(mean): --  ABP: 105/67 (2021 10:00) (90/54 - 113/64)  ABP(mean): 77 (2021 10:00) (63 - 77)  RR: 20 (2021 10:00) (19 - 35)  SpO2: 99% (2021 10:00) (96% - 100%)       @ 07: @ 07:00  --------------------------------------------------------  IN: 2693.9 mL / OUT: 1415 mL / NET: 1278.9 mL     @ 07:  -   @ 10:49  --------------------------------------------------------  IN: 420.8 mL / OUT: 115 mL / NET: 305.8 mL  PHYSICAL EXAM:Daily   Elderly male S/P tracheostomy on trach. collar 50% FI02 ,  Daily Weight in k.4 (2021 00:00)  HEENT:     + NCAT  + EOMI  - Conjuctival edema   - Icterus   - Thrush   - ETT  + NGT/OGT  Neck:         + FROM  RT IJ  line JVD  - Nodes - Masses + Mid-line trachea + Tracheostomy  Chest:            normal A-P diameter    Lungs:          + CTA   + Rhonchi    - Rales    - Wheezing + Decreased  LT BS   - Dullness R L  Cardiac:       + S1 + S2    + RRR   - Irregular   - S3  - S4    - Murmurs   - Rub   - Hamman’s sign   Abdomen:    + BS  + Soft + Non-tender - Distended - Organomegaly - PEG .cholecystostomy tube in place  Extremities:   - Cyanosis U/L   - Clubbing  U/L  + LE/UE Edema   + Capillary refill    + Pulses   Neuro:        - Awake   -  Alert   - Confused   - Lethargic   + Sedated  + Generalized Weakness  Skin:        - Rashes    - Erythema   + Normal incisions   + IV sites intact          HOSPITAL MEDICATIONS: All medications reviewed and analyzed  MEDICATIONS  (STANDING):  amiodarone    Tablet 200 milliGRAM(s) Oral daily  chlorhexidine 0.12% Liquid 15 milliLiter(s) Oral Mucosa every 12 hours  chlorhexidine 2% Cloths 1 Application(s) Topical <User Schedule>  dexmedetomidine Infusion 0.5 MICROgram(s)/kG/Hr (9.81 mL/Hr) IV Continuous <Continuous>  dextrose 50% Injectable 50 milliLiter(s) IV Push every 15 minutes  heparin  Infusion 400 Unit(s)/Hr (12.5 mL/Hr) IV Continuous <Continuous>  Hydromorphone  Injectable 0.5 milliGRAM(s) IV Push once  insulin lispro (ADMELOG) corrective regimen sliding scale   SubCutaneous every 6 hours  pantoprazole  Injectable 40 milliGRAM(s) IV Push every 12 hours  piperacillin/tazobactam IVPB.. 3.375 Gram(s) IV Intermittent every 8 hours  propofol Infusion 20 MICROgram(s)/kG/Min (9.42 mL/Hr) IV Continuous <Continuous>  sodium chloride 0.9% lock flush 3 milliLiter(s) IV Push every 8 hours  sodium chloride 0.9%. 1000 milliLiter(s) (10 mL/Hr) IV Continuous <Continuous>    MEDICATIONS  (PRN):  acetaminophen    Suspension .. 650 milliGRAM(s) Oral every 6 hours PRN Temp greater or equal to 38C (100.4F)    LABS: All Lab data reviewed and analyzed                        8.2    22.87 )-----------( 259      ( 02 Sep 2021 00:41 )             26.6        137  |  100  |  21  ----------------------------<  109<H>  4.3   |  27  |  0.50    Ca    10.1      02 Sep 2021 00:41  Phos  3.7       Mg     2.0         TPro  8.2  /  Alb  3.1<L>  /  TBili  0.5  /  DBili  x   /  AST  59<H>  /  ALT  150<H>  /  AlkPhos  308<H>      Ca    10.3      31 Aug 2021 00:44  Phos  2.6       Mg     1.8         TPro  8.3  /  Alb  3.2<L>  /  TBili  0.6  /  DBili  x   /  AST  70<H>  /  ALT  132<H>  /  AlkPhos  313<H>                                                                                                                      PTT - ( 2021 04:52 )  PTT:45.2 sec LIVER FUNCTIONS - ( 2021 00:42 )  Alb: 3.4 g/dL / Pro: 6.7 g/dL / ALK PHOS: 213 U/L / ALT: 15 U/L / AST: 24 U/L / GGT: x           RADIOLOGY: - Reviewed and analyzed RT Pig tail cathter  , LVAD HM2, CT scan of abdomen reviewed result noted

## 2021-09-02 NOTE — PROGRESS NOTE ADULT - SUBJECTIVE AND OBJECTIVE BOX
RICKY HAMPTON  MRN-[id number]  Patient is a 66y old  Male who presents with a chief complaint of Anemia, Supratherapeutic INR, Dark Stools (01 Sep 2021 18:43)    HPI:  65M PMH ACC/AHA stage D HF due to NICM HM2 LVAD , TV annuloplasty ring 17 as destination therapy due to severe peripheral artery disease with significant stenosis  SIADH, Depression, CKD-3 with hyperkalemia, past E. coli UTIs, driveline drainage (21) and COVID-19 (back in 2020)  He was recently seen in clinic where he complained of abdominal pain and dark stools w constipation back in May. He presents to [facility] ER today weakness and fatigue, moderate and + Black stools for three days, on coumadin secondary to warfarin use in the setting of an LVAD. Patient has required transfusions for GIB in the past. Mostly recently back in 2021 pt had anemia with dark stools. No interventions was done at that time. However Last Endoscopy was done in 2020 (negative). Today labs show patient is anemic with H/H of 4.5/16.3,. INR is 8.84 MAP in the 90s, Temp 35.1. He denies any chest pain, shortness of breath, dizziness, abd pain, nausea or vomiting.       (2021 16:57)      Surgery/Hospital Course:   admit for melena w/ anemia, INR 8.84   6/15 Capsul study (+) for small bowel bleed, balloon endoscopy (old blood in prox ileum); post EGD - septic w/ L opacity, re-intubated for concern for aspiration, TTE (Mod MR, decrease biV w/ interventricular septum boweing towards R)   bronch    +C Diff    TC    CT C/A/P: Fluid filled colon which may be 2/2 rapid transit. Small bilateral pleffs with associates. Compressive atelectasis New ISABELLE & LLL  parenchymal opacities, suspicious for pneumonia. 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    #8 Shiley trach at bedside    CPAP trials    LVAD speed increased to 9200   Bronch; Central line dc'ed   TC since , continue as tolerated. Patient transferred to SDU.    Cont PT, no trach downsize today(#8cuffed)/ Anticoagulation/ Continue TF, speech f/u/ Vanco taper    oob to chair  INR  2.47  5 mg coumadin     increased lop to 25 q8  per HF   INR today 2.64.  H&H 7.3 this AM.  Will repeat CBC at noon, and will send stool guaiac Patient with persistent abdominal tenderness, rate of tube feeds decreased.  No nausea/vomiting.     INR today 2.4H&H 9.1.6 low flow overnight /N&V  NPO resolved for capsule study  Coumadin on hold refusing Tube feeds on D5 1/2 normal  @50 cc/hr   INR 2.69  H&H 7..1 refusing Tube feeds on D5 1/2 normal  @50 cc/hr. This am + BM Anusha Oneill HF  aware- PRBC x1  GI team consulted -  NPO  plan on study in am-  D/w Dr Cadet Patient  to return to CTU for further management; 1PRBCS    Post op INR 2.2 today.  No bleeding. BC + for SM.  Pt is hypotensive requiring pressor and inotropic support.  ID follow up today on Cefepime will follow.   R PTC for PTX    CT C/A/P: sub q emphysema in R chest wall, GGO RUL, small ascites CTH negative; Abd US: GB thickening, pericholecystic fluid     Perchole drain in place continues to drain total output overnight 133.  Fever today 38.8 given tylenol.  Will repeat BC now.     duplex LE negative    Patient with persistent abdominal pain, refusing tube feeds and medications, Psych consulted   CTA A/P ordered to r/o mesenteric ischemia 2/2 persistent anorexia, nausea, vomiting. Revealed:  Evaluation of the mesenteric vessels is limited by streak artifact from LVAD. There appears to be severe stenosis of the proximal SMA; abdominal mesenteric doppler is recommended for further evaluation. 2.  No small bowel findings to suggest acute mesenteric ischemia. 3.  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  8/15: Cultures resulted BC 1/2 +GPC in clusters, SC enterobacter; mesenteric duplex: borderline stenosis of proximal SMA   : CT C/A/P noncon: Nondular opacities in R lung apex w/cavitation, abd nl  :  Continue current care, treatment of thyrotoxicosis with medications as per endocrine, d/c ABX as per team. Ambulate with PT today, continue respiratory and nutritional care.    24 Hour Events:   ·	Emesis x1 yesterday, no episodes overnight, continue TFs @ 30cc/hr, continue to increase 5cc daily as tolerated   ·	Hypothermic this AM, 35.7C with increasing WBC 13.59->22.87 (pending repeat test), recultured, will f/u results and plan to restart cefepime.  ·	Plan to get abdominal XR to ensure feeding tube is post pyloric today   ·	Send repeat TSH today, will f/u      REVIEW OF SYSTEMS:  Patient speaks over trach   Gen: No fever  EYES/ENT: No visual changes;  No vertigo or throat pain   NECK: No pain   RES:  No shortness of breath or Cough  CARD: No chest pain   GI: No abdominal pain  : No dysuria  NEURO: No weakness  SKIN: No itching, rashes     ICU Vital Signs Last 24 Hrs  T(C): 35.9 (02 Sep 2021 08:00), Max: 36.8 (01 Sep 2021 12:00)  T(F): 96.6 (02 Sep 2021 08:00), Max: 98.3 (01 Sep 2021 12:00)  HR: 92 (02 Sep 2021 08:00) (88 - 134)  BP: --  BP(mean): --  ABP: 85/68 (02 Sep 2021 08:00) (76/63 - 303/303)  ABP(mean): 75 (02 Sep 2021 08:00) (61 - 303)  RR: 31 (02 Sep 2021 08:00) (17 - 56)  SpO2: 100% (02 Sep 2021 08:00) (92% - 100%)      Physical Exam:  Gen:  Awake and alert, Sitting in chair in NAD.  Psych: Mood greatly improved, more interactive and participating in care. Would like to be more included in bedside rounding.  CNS:  intact, nonfocal, responds to all commands  Neck: no JVD, +TC   RES : course breath sounds, no wheezing              CVS: +LVAD hum   Abd: Soft, RUQ tenderness by perc sybil site. Sybil drain with bilious fluid. Positive BS throughout.  Skin: No rash  Ext:  no edema    ============================I/O===========================   I&O's Detail    01 Sep 2021 07:01  -  02 Sep 2021 07:00  --------------------------------------------------------  IN:    Enteral Tube Flush: 140 mL    Miscellaneous Tube Feedin mL    multiple electrolytes Injection Type 1.: 720 mL  Total IN: 1610 mL    OUT:    Drain (mL): 445 mL    Emesis (mL): 1 mL    Voided (mL): 1050 mL  Total OUT: 1496 mL    Total NET: 114 mL      02 Sep 2021 07:01  -  02 Sep 2021 09:04  --------------------------------------------------------  IN:    Miscellaneous Tube Feedin mL    multiple electrolytes Injection Type 1.: 30 mL  Total IN: 60 mL    OUT:    Voided (mL): 200 mL  Total OUT: 200 mL    Total NET: -140 mL        ============================ LABS =========================                        8.2    22.87 )-----------( 259      ( 02 Sep 2021 00:41 )             26.6         137  |  100  |  21  ----------------------------<  109<H>  4.3   |  27  |  0.50    Ca    10.1      02 Sep 2021 00:41  Phos  3.7       Mg     2.0         TPro  8.2  /  Alb  3.1<L>  /  TBili  0.5  /  DBili  x   /  AST  59<H>  /  ALT  150<H>  /  AlkPhos  308<H>      LIVER FUNCTIONS - ( 02 Sep 2021 00:41 )  Alb: 3.1 g/dL / Pro: 8.2 g/dL / ALK PHOS: 308 U/L / ALT: 150 U/L / AST: 59 U/L / GGT: x             ABG - ( 02 Sep 2021 00:22 )  pH, Arterial: 7.41  pH, Blood: x     /  pCO2: 51    /  pO2: 169   / HCO3: 32    / Base Excess: 6.8   /  SaO2: 99.7                ======================Micro/Rad/Cardio=================  Culture: Reviewed   CXR: Reviewed  Echo:Reviewed  ======================================================  PAST MEDICAL & SURGICAL HISTORY:  CHF (congestive heart failure)    CAD (coronary artery disease)    Depression    Pleural effusion    History of 2019 novel coronavirus disease (COVID-19)  2020    Hemorrhoids    Bleeding hemorrhoids    Peripheral arterial disease    Claudication    BPH with urinary obstruction    ACC/AHA stage D systolic heart failure    Anticoagulation goal of INR 2.0 to 2.5    Falls    Clavicle fracture    CKD (chronic kidney disease), stage III    Iron deficiency anemia    H/O epistaxis    Vertigo    GI bleed    S/P TVR (tricuspid valve repair)    S/P ventricular assist device    S/P endoscopy      ====================ASSESSMENT ==============  Stage D Nonischemic Cardiomyopathy, Status Post HM2 on 2017    Cardiogenic shock  Hemodynamic instability   Acute hypoxemic respiratory failure  GI bleed , Status Post Enteroscopy   Anemia, in setting of melena   Chronic Kidney Disease  Stress hyperglycemia   C.diff positive on    Hypovolemic shock  Septic shock  Leukocytosis    Plan:  ====================== NEUROLOGY=====================  Nonfocal, continue to monitor neuro status   C/w mirtazapine and sertraline for depression  PT/Ambulate as tolerated    mirtazapine 7.5 milliGRAM(s) Oral daily  sertraline 100 milliGRAM(s) Oral daily    ==================== RESPIRATORY======================  Acute hypoxemic respiratory failure s/p #8 shiley trach on , continue TC as tolerated (TC since )  Continue close monitoring of respiratory rate and breathing pattern, following of ABG’s with A-line monitoring, continuous pulse oximetry monitoring.   Moderate secretions, continue suctioning prn    ====================CARDIOVASCULAR==================  Stage D Nonischemic Cardiomyopathy, Status Post HM2 on 2017; LVAD settings 9200, flow 5.2  TTE : reveals at least moderate MR, mild AI, severe global LV syst dysfxn, dec RV fxn   Continue invasive hemodynamic monitoring   Propranolol for rate control of HR 2/2 Hyperthyroidism, titrate as tolerated    propranolol 20 milliGRAM(s) Oral every 6 hours    ===================HEMATOLOGIC/ONC ===================  Acute blood loss anemia, monitor H&H/Plts    Continue monitor LDH daily     heparin   Injectable 5000 Unit(s) SubCutaneous every 8 hours for VTE prophylaxis     ===================== RENAL =========================  Continue to monitor I/Os, BUN/Creatinine, and urine output.   Goal net negative fluid balance. Replete lytes PRN. Keep K> 4 and Mg >2.     ==================== GASTROINTESTINAL===================  S/p cholecystostomy tube placed on : with IR with -60cc of black bile, will monitor site closely.   Recent emesis on  in setting of abdominal pain, as per GI likely component of illeus given critical illness   CTA A/P : Evaluation of the mesenteric vessels is limited by streak artifact from LVAD. There appears to be severe stenosis of the proximal SMA; abdominal mesenteric doppler is recommended for further evaluation. 2.  No small bowel findings to suggest acute mesenteric ischemia. 3.  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  Mesenteric duplex on 8/15 borderline stenosis of proximal SMA   Emesis x1 yesterday, no episodes overnight, continue TFs @ 30cc/hr, increased 5cc daily as tolerated   Plan to get abdominal XR to ensure feeding tube is post pyloric today   CT Abd/Pel on  without acute process   Reglan for gut motility  Zofran PRN nausea/vomiting      dextrose 5%. 1000 milliLiter(s) (50 mL/Hr) IV Continuous <Continuous>  dextrose 5%. 1000 milliLiter(s) (100 mL/Hr) IV Continuous <Continuous>  multiple electrolytes Injection Type 1 1000 milliLiter(s) (30 mL/Hr) IV Continuous <Continuous>  multivitamin 1 Tablet(s) Oral daily  pantoprazole  Injectable 40 milliGRAM(s) IV Push every 12 hours for GI pp x  simethicone 80 milliGRAM(s) Chew every 8 hours PRN Gas  thiamine 100 milliGRAM(s) Oral daily  metoclopramide Injectable 10 milliGRAM(s) IV Push every 8 hours  ondansetron Injectable 4 milliGRAM(s) IV Push every 6 hours PRN Nausea and/or Vomiting    =======================    ENDOCRINE  =====================  Stress hyperglycemia       - Continue glucose control with admelog sliding scale and NPH     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 - Free T4 remains elevated >7.9   Per endocrine      - Thyroid US when trach is out      - Continue with hydrocortisone 50q8      - Propanolol as above for optimal T4-->T3 conversion      - c/w Methimazole       - send repeat TSH, will f/u       dextrose 40% Gel 15 Gram(s) Oral once  dextrose 50% Injectable 25 Gram(s) IV Push once  dextrose 50% Injectable 12.5 Gram(s) IV Push once  dextrose 50% Injectable 25 Gram(s) IV Push once  glucagon  Injectable 1 milliGRAM(s) IntraMuscular once  hydrocortisone sodium succinate Injectable 50 milliGRAM(s) IV Push every 8 hours  insulin lispro (ADMELOG) corrective regimen sliding scale   SubCutaneous every 6 hours  insulin NPH human recombinant 12 Unit(s) SubCutaneous <User Schedule>  insulin NPH human recombinant 10 Unit(s) SubCutaneous <User Schedule>  methimazole 20 milliGRAM(s) Oral <User Schedule>    ========================INFECTIOUS DISEASE================  Hypothermic this AM, 35.7C with increasing WBC 13.59->22.87 (pending repeat test)  Recultured, will f/u results and plan to restart cefepime   Continue trending WBC and monitoring fever curve   Culture from sybil drain on : NGTD      Patient requires continuous monitoring with bedside rhythm monitoring, pulse ox monitoring, and intermittent blood gas analysis. Care plan discussed with ICU care team. Patient remained critical and at risk for life threatening decompensation.     By signing my name below, I, Agnieszka Cherry, attest that this documentation has been prepared under the direction and in the presence of Evelia Pierce NP   Electronically signed: Romel Shaffer, 21 @ 09:04    I, Evelia Pierce, personally performed the services described in this documentation. all medical record entries made by the romel were at my direction and in my presence. I have reviewed the chart and agree that the record reflects my personal performance and is accurate and complete  Electronically signed: Evelia Pierce NP         RICKY HAMPTON  MRN-[id number]  Patient is a 66y old  Male who presents with a chief complaint of Anemia, Supratherapeutic INR, Dark Stools (01 Sep 2021 18:43)    HPI:  65M PMH ACC/AHA stage D HF due to NICM HM2 LVAD , TV annuloplasty ring 17 as destination therapy due to severe peripheral artery disease with significant stenosis  SIADH, Depression, CKD-3 with hyperkalemia, past E. coli UTIs, driveline drainage (21) and COVID-19 (back in 2020)  He was recently seen in clinic where he complained of abdominal pain and dark stools w constipation back in May. He presents to [facility] ER today weakness and fatigue, moderate and + Black stools for three days, on coumadin secondary to warfarin use in the setting of an LVAD. Patient has required transfusions for GIB in the past. Mostly recently back in 2021 pt had anemia with dark stools. No interventions was done at that time. However Last Endoscopy was done in 2020 (negative). Today labs show patient is anemic with H/H of 4.5/16.3,. INR is 8.84 MAP in the 90s, Temp 35.1. He denies any chest pain, shortness of breath, dizziness, abd pain, nausea or vomiting.       (2021 16:57)      Surgery/Hospital Course:   admit for melena w/ anemia, INR 8.84   6/15 Capsul study (+) for small bowel bleed, balloon endoscopy (old blood in prox ileum); post EGD - septic w/ L opacity, re-intubated for concern for aspiration, TTE (Mod MR, decrease biV w/ interventricular septum boweing towards R)   bronch    +C Diff    TC    CT C/A/P: Fluid filled colon which may be 2/2 rapid transit. Small bilateral pleffs with associates. Compressive atelectasis New ISABELLE & LLL  parenchymal opacities, suspicious for pneumonia. 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    #8 Shiley trach at bedside    CPAP trials    LVAD speed increased to 9200   Bronch; Central line dc'ed   TC since , continue as tolerated. Patient transferred to SDU.    Cont PT, no trach downsize today(#8cuffed)/ Anticoagulation/ Continue TF, speech f/u/ Vanco taper    oob to chair  INR  2.47  5 mg coumadin     increased lop to 25 q8  per HF   INR today 2.64.  H&H 7.3 this AM.  Will repeat CBC at noon, and will send stool guaiac Patient with persistent abdominal tenderness, rate of tube feeds decreased.  No nausea/vomiting.     INR today 2.4H&H 9.1.6 low flow overnight /N&V  NPO resolved for capsule study  Coumadin on hold refusing Tube feeds on D5 1/2 normal  @50 cc/hr   INR 2.69  H&H 7..1 refusing Tube feeds on D5 1/2 normal  @50 cc/hr. This am + BM Anusha Oneill HF  aware- PRBC x1  GI team consulted -  NPO  plan on study in am-  D/w Dr Cadet Patient  to return to CTU for further management; 1PRBCS    Post op INR 2.2 today.  No bleeding. BC + for SM.  Pt is hypotensive requiring pressor and inotropic support.  ID follow up today on Cefepime will follow.   R PTC for PTX    CT C/A/P: sub q emphysema in R chest wall, GGO RUL, small ascites CTH negative; Abd US: GB thickening, pericholecystic fluid     Perchole drain in place continues to drain total output overnight 133.  Fever today 38.8 given tylenol.  Will repeat BC now.     duplex LE negative    Patient with persistent abdominal pain, refusing tube feeds and medications, Psych consulted   CTA A/P ordered to r/o mesenteric ischemia 2/2 persistent anorexia, nausea, vomiting. Revealed:  Evaluation of the mesenteric vessels is limited by streak artifact from LVAD. There appears to be severe stenosis of the proximal SMA; abdominal mesenteric doppler is recommended for further evaluation. 2.  No small bowel findings to suggest acute mesenteric ischemia. 3.  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  8/15: Cultures resulted BC 1/2 +GPC in clusters, SC enterobacter; mesenteric duplex: borderline stenosis of proximal SMA   : CT C/A/P noncon: Nondular opacities in R lung apex w/cavitation, abd nl  :  Continue current care, treatment of thyrotoxicosis with medications as per endocrine, d/c ABX as per team. Ambulate with PT today, continue respiratory and nutritional care.    24 Hour Events:   ·	Emesis x1 yesterday, no episodes overnight, continue TFs @ 30cc/hr, continue to increase 5cc daily as tolerated   ·	Hypothermic this AM, 35.7C with increasing WBC 13.59->22.87 (pending repeat test), recultured, will f/u results and plan to restart cefepime for concerns of aspiration.  ·	Plan to get abdominal XR to ensure feeding tube is post pyloric today   ·	F/u thyroid studies and d/w endocrinology     REVIEW OF SYSTEMS:  Patient speaks over trach   Gen: No fever  EYES/ENT: No visual changes;  No vertigo or throat pain   NECK: No pain   RES:  No shortness of breath or Cough  CARD: No chest pain   GI: No abdominal pain  : No dysuria  NEURO: No weakness  SKIN: No itching, rashes     ICU Vital Signs Last 24 Hrs  T(C): 35.9 (02 Sep 2021 08:00), Max: 36.8 (01 Sep 2021 12:00)  T(F): 96.6 (02 Sep 2021 08:00), Max: 98.3 (01 Sep 2021 12:00)  HR: 92 (02 Sep 2021 08:00) (88 - 134)  BP: --  BP(mean): --  ABP: 85/68 (02 Sep 2021 08:00) (76/63 - 303/303)  ABP(mean): 75 (02 Sep 2021 08:00) (61 - 303)  RR: 31 (02 Sep 2021 08:00) (17 - 56)  SpO2: 100% (02 Sep 2021 08:00) (92% - 100%)      Physical Exam:  Gen:  Awake and alert, Sitting in chair in NAD.  Psych: Mood greatly improved, more interactive and participating in care. Would like to be more included in bedside rounding.  CNS:  intact, nonfocal, responds to all commands  Neck: no JVD, +TC   RES : course breath sounds, no wheezing              CVS: +LVAD hum   Abd: Soft, RUQ tenderness by perc sybil site. Sybil drain with bilious fluid. Positive BS throughout.  Skin: No rash  Ext:  no edema    ============================I/O===========================   I&O's Detail    01 Sep 2021 07:01  -  02 Sep 2021 07:00  --------------------------------------------------------  IN:    Enteral Tube Flush: 140 mL    Miscellaneous Tube Feedin mL    multiple electrolytes Injection Type 1.: 720 mL  Total IN: 1610 mL    OUT:    Drain (mL): 445 mL    Emesis (mL): 1 mL    Voided (mL): 1050 mL  Total OUT: 1496 mL    Total NET: 114 mL      02 Sep 2021 07:01  -  02 Sep 2021 09:04  --------------------------------------------------------  IN:    Miscellaneous Tube Feedin mL    multiple electrolytes Injection Type 1.: 30 mL  Total IN: 60 mL    OUT:    Voided (mL): 200 mL  Total OUT: 200 mL    Total NET: -140 mL        ============================ LABS =========================                        8.2    22.87 )-----------( 259      ( 02 Sep 2021 00:41 )             26.6     02    137  |  100  |  21  ----------------------------<  109<H>  4.3   |  27  |  0.50    Ca    10.1      02 Sep 2021 00:41  Phos  3.7     02  Mg     2.0     09-02    TPro  8.2  /  Alb  3.1<L>  /  TBili  0.5  /  DBili  x   /  AST  59<H>  /  ALT  150<H>  /  AlkPhos  308<H>      LIVER FUNCTIONS - ( 02 Sep 2021 00:41 )  Alb: 3.1 g/dL / Pro: 8.2 g/dL / ALK PHOS: 308 U/L / ALT: 150 U/L / AST: 59 U/L / GGT: x             ABG - ( 02 Sep 2021 00:22 )  pH, Arterial: 7.41  pH, Blood: x     /  pCO2: 51    /  pO2: 169   / HCO3: 32    / Base Excess: 6.8   /  SaO2: 99.7                ======================Micro/Rad/Cardio=================  Culture: Reviewed   CXR: Reviewed  Echo:Reviewed  ======================================================  PAST MEDICAL & SURGICAL HISTORY:  CHF (congestive heart failure)    CAD (coronary artery disease)    Depression    Pleural effusion    History of 2019 novel coronavirus disease (COVID-19)  2020    Hemorrhoids    Bleeding hemorrhoids    Peripheral arterial disease    Claudication    BPH with urinary obstruction    ACC/AHA stage D systolic heart failure    Anticoagulation goal of INR 2.0 to 2.5    Falls    Clavicle fracture    CKD (chronic kidney disease), stage III    Iron deficiency anemia    H/O epistaxis    Vertigo    GI bleed    S/P TVR (tricuspid valve repair)    S/P ventricular assist device    S/P endoscopy      ====================ASSESSMENT ==============  Stage D Nonischemic Cardiomyopathy, Status Post HM2 on 2017    Cardiogenic shock  Hemodynamic instability   Acute hypoxemic respiratory failure  GI bleed , Status Post Enteroscopy   Anemia, in setting of melena   Chronic Kidney Disease  Stress hyperglycemia   C.diff positive on    Hypovolemic shock  Septic shock  Leukocytosis    Plan:  ====================== NEUROLOGY=====================  Nonfocal, continue to monitor neuro status   C/w mirtazapine and sertraline for depression  PT/Ambulate as tolerated    mirtazapine 7.5 milliGRAM(s) Oral daily  sertraline 100 milliGRAM(s) Oral daily    ==================== RESPIRATORY======================  Acute hypoxemic respiratory failure s/p #8 shiley trach on , continue TC as tolerated (TC since )  Continue close monitoring of respiratory rate and breathing pattern, following of ABG’s with A-line monitoring, continuous pulse oximetry monitoring.   Moderate secretions, continue suctioning prn    ====================CARDIOVASCULAR==================  Stage D Nonischemic Cardiomyopathy, Status Post HM2 on 2017; LVAD settings 9200, flow 5.2  TTE : reveals at least moderate MR, mild AI, severe global LV syst dysfxn, dec RV fxn   Continue invasive hemodynamic monitoring   Propranolol for rate control of HR 2/2 Hyperthyroidism, titrate as tolerated    propranolol 20 milliGRAM(s) Oral every 6 hours    ===================HEMATOLOGIC/ONC ===================  Acute blood loss anemia, monitor H&H/Plts    Continue monitor LDH daily     heparin   Injectable 5000 Unit(s) SubCutaneous every 8 hours for VTE prophylaxis     ===================== RENAL =========================  Continue to monitor I/Os, BUN/Creatinine, and urine output.   Goal net negative fluid balance. Replete lytes PRN. Keep K> 4 and Mg >2.     ==================== GASTROINTESTINAL===================  S/p cholecystostomy tube placed on : with IR with -60cc of black bile, will monitor site closely.   Recent emesis on  in setting of abdominal pain, as per GI likely component of illeus given critical illness   CTA A/P : Evaluation of the mesenteric vessels is limited by streak artifact from LVAD. There appears to be severe stenosis of the proximal SMA; abdominal mesenteric doppler is recommended for further evaluation. 2.  No small bowel findings to suggest acute mesenteric ischemia. 3.  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  Mesenteric duplex on 8/15 borderline stenosis of proximal SMA   Emesis x1 yesterday, no episodes overnight, continue TFs @ 30cc/hr, increased 5cc daily as tolerated   Plan to get abdominal XR to ensure feeding tube is post pyloric today   CT Abd/Pel on  without acute process   Reglan for gut motility  Zofran PRN nausea/vomiting      dextrose 5%. 1000 milliLiter(s) (50 mL/Hr) IV Continuous <Continuous>  dextrose 5%. 1000 milliLiter(s) (100 mL/Hr) IV Continuous <Continuous>  multiple electrolytes Injection Type 1 1000 milliLiter(s) (30 mL/Hr) IV Continuous <Continuous>  multivitamin 1 Tablet(s) Oral daily  pantoprazole  Injectable 40 milliGRAM(s) IV Push every 12 hours for GI pp x  simethicone 80 milliGRAM(s) Chew every 8 hours PRN Gas  thiamine 100 milliGRAM(s) Oral daily  metoclopramide Injectable 10 milliGRAM(s) IV Push every 8 hours  ondansetron Injectable 4 milliGRAM(s) IV Push every 6 hours PRN Nausea and/or Vomiting    =======================    ENDOCRINE  =====================  Stress hyperglycemia       - Continue glucose control with admelog sliding scale and NPH     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 - Free T4 remains elevated >7.9   Per endocrine      - Thyroid US when trach is out      - Continue with hydrocortisone 50q8      - Propanolol as above for optimal T4-->T3 conversion      - c/w Methimazole       - F/u thyroid studies and d/w endocrinology       dextrose 40% Gel 15 Gram(s) Oral once  dextrose 50% Injectable 25 Gram(s) IV Push once  dextrose 50% Injectable 12.5 Gram(s) IV Push once  dextrose 50% Injectable 25 Gram(s) IV Push once  glucagon  Injectable 1 milliGRAM(s) IntraMuscular once  hydrocortisone sodium succinate Injectable 50 milliGRAM(s) IV Push every 8 hours  insulin lispro (ADMELOG) corrective regimen sliding scale   SubCutaneous every 6 hours  insulin NPH human recombinant 12 Unit(s) SubCutaneous <User Schedule>  insulin NPH human recombinant 10 Unit(s) SubCutaneous <User Schedule>  methimazole 20 milliGRAM(s) Oral <User Schedule>    ========================INFECTIOUS DISEASE================  Hypothermic this AM, 35.7C with increasing WBC 13.59->22.87 (pending repeat test)  Recultured, will f/u results and plan to restart cefepime in setting of concern for aspiration   Continue trending WBC and monitoring fever curve   Culture from sybil drain on : NGTD      Patient requires continuous monitoring with bedside rhythm monitoring, pulse ox monitoring, and intermittent blood gas analysis. Care plan discussed with ICU care team. Patient remained critical and at risk for life threatening decompensation.     By signing my name below, I, Agnieszka Cherry, attest that this documentation has been prepared under the direction and in the presence of Evelia Pierce NP   Electronically signed: Romel Shaffer, 21 @ 09:04    I, Evelia Pierce, personally performed the services described in this documentation. all medical record entries made by the romel were at my direction and in my presence. I have reviewed the chart and agree that the record reflects my personal performance and is accurate and complete  Electronically signed: Evelia Pierce NP         RICKY HAMPTON  MRN-[id number]  Patient is a 66y old  Male who presents with a chief complaint of Anemia, Supratherapeutic INR, Dark Stools (01 Sep 2021 18:43)    HPI:  65M PMH ACC/AHA stage D HF due to NICM HM2 LVAD , TV annuloplasty ring 17 as destination therapy due to severe peripheral artery disease with significant stenosis  SIADH, Depression, CKD-3 with hyperkalemia, past E. coli UTIs, driveline drainage (21) and COVID-19 (back in 2020)  He was recently seen in clinic where he complained of abdominal pain and dark stools w constipation back in May. He presents to [facility] ER today weakness and fatigue, moderate and + Black stools for three days, on coumadin secondary to warfarin use in the setting of an LVAD. Patient has required transfusions for GIB in the past. Mostly recently back in 2021 pt had anemia with dark stools. No interventions was done at that time. However Last Endoscopy was done in 2020 (negative). Today labs show patient is anemic with H/H of 4.5/16.3,. INR is 8.84 MAP in the 90s, Temp 35.1. He denies any chest pain, shortness of breath, dizziness, abd pain, nausea or vomiting.       (2021 16:57)      Surgery/Hospital Course:   admit for melena w/ anemia, INR 8.84   6/15 Capsul study (+) for small bowel bleed, balloon endoscopy (old blood in prox ileum); post EGD - septic w/ L opacity, re-intubated for concern for aspiration, TTE (Mod MR, decrease biV w/ interventricular septum boweing towards R)   bronch    +C Diff    TC    CT C/A/P: Fluid filled colon which may be 2/2 rapid transit. Small bilateral pleffs with associates. Compressive atelectasis New ISABELLE & LLL  parenchymal opacities, suspicious for pneumonia. 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    #8 Shiley trach at bedside    CPAP trials    LVAD speed increased to 9200   Bronch; Central line dc'ed   TC since , continue as tolerated. Patient transferred to SDU.    Cont PT, no trach downsize today(#8cuffed)/ Anticoagulation/ Continue TF, speech f/u/ Vanco taper    oob to chair  INR  2.47  5 mg coumadin     increased lop to 25 q8  per HF   INR today 2.64.  H&H 7.3 this AM.  Will repeat CBC at noon, and will send stool guaiac Patient with persistent abdominal tenderness, rate of tube feeds decreased.  No nausea/vomiting.     INR today 2.4H&H 9.1.6 low flow overnight /N&V  NPO resolved for capsule study  Coumadin on hold refusing Tube feeds on D5 1/2 normal  @50 cc/hr   INR 2.69  H&H 7..1 refusing Tube feeds on D5 1/2 normal  @50 cc/hr. This am + BM Anusha Oneill HF  aware- PRBC x1  GI team consulted -  NPO  plan on study in am-  D/w Dr Cadet Patient  to return to CTU for further management; 1PRBCS    Post op INR 2.2 today.  No bleeding. BC + for SM.  Pt is hypotensive requiring pressor and inotropic support.  ID follow up today on Cefepime will follow.   R PTC for PTX    CT C/A/P: sub q emphysema in R chest wall, GGO RUL, small ascites CTH negative; Abd US: GB thickening, pericholecystic fluid     Perchole drain in place continues to drain total output overnight 133.  Fever today 38.8 given tylenol.  Will repeat BC now.     duplex LE negative    Patient with persistent abdominal pain, refusing tube feeds and medications, Psych consulted   CTA A/P ordered to r/o mesenteric ischemia 2/2 persistent anorexia, nausea, vomiting. Revealed:  Evaluation of the mesenteric vessels is limited by streak artifact from LVAD. There appears to be severe stenosis of the proximal SMA; abdominal mesenteric doppler is recommended for further evaluation. 2.  No small bowel findings to suggest acute mesenteric ischemia. 3.  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  8/15: Cultures resulted BC 1/2 +GPC in clusters, SC enterobacter; mesenteric duplex: borderline stenosis of proximal SMA   : CT C/A/P noncon: Nondular opacities in R lung apex w/cavitation, abd nl  :  Continue current care, treatment of thyrotoxicosis with medications as per endocrine, d/c ABX as per team. Ambulate with PT today, continue respiratory and nutritional care.    24 Hour Events:   ·	Emesis x1 yesterday, no episodes overnight, continue TFs @ 30cc/hr, continue to increase 5cc daily as tolerated   ·	Hypothermic this AM, 35.7C with increasing WBC 13.59->22.87 (pending repeat test), recultured, will f/u results and plan to restart cefepime for concerns of aspiration.  ·	Plan to get abdominal XR to ensure feeding tube is post pyloric today   ·	F/u thyroid studies and d/w endocrinology     REVIEW OF SYSTEMS:  Patient speaks over trach   Gen: No fever  EYES/ENT: No visual changes;  No vertigo or throat pain   NECK: No pain   RES:  No shortness of breath or Cough  CARD: No chest pain   GI: No abdominal pain  : No dysuria  NEURO: No weakness  SKIN: No itching, rashes     ICU Vital Signs Last 24 Hrs  T(C): 35.9 (02 Sep 2021 08:00), Max: 36.8 (01 Sep 2021 12:00)  T(F): 96.6 (02 Sep 2021 08:00), Max: 98.3 (01 Sep 2021 12:00)  HR: 92 (02 Sep 2021 08:00) (88 - 134)  BP: --  BP(mean): --  ABP: 85/68 (02 Sep 2021 08:00) (76/63 - 303/303)  ABP(mean): 75 (02 Sep 2021 08:00) (61 - 303)  RR: 31 (02 Sep 2021 08:00) (17 - 56)  SpO2: 100% (02 Sep 2021 08:00) (92% - 100%)      Physical Exam:  Gen:  Awake and alert, Sitting in chair in NAD.  Psych: Mood greatly improved but waxes and wanes, more interactive and participating in care. Would like to be more included in bedside rounding.  CNS:  intact, nonfocal, responds to all commands  Neck: no JVD, +TC   RES : course breath sounds, no wheezing, moderate tan secretions able to cough most up on own         CVS: +LVAD hum   Abd: Soft, RUQ tenderness by perc sybil site. Sybil drain with bilious fluid. Positive BS throughout.  Skin: No rash  Ext:  no edema    ============================I/O===========================   I&O's Detail    01 Sep 2021 07:01  -  02 Sep 2021 07:00  --------------------------------------------------------  IN:    Enteral Tube Flush: 140 mL    Miscellaneous Tube Feedin mL    multiple electrolytes Injection Type 1.: 720 mL  Total IN: 1610 mL    OUT:    Drain (mL): 445 mL    Emesis (mL): 1 mL    Voided (mL): 1050 mL  Total OUT: 1496 mL    Total NET: 114 mL      02 Sep 2021 07:01  -  02 Sep 2021 09:04  --------------------------------------------------------  IN:    Miscellaneous Tube Feedin mL    multiple electrolytes Injection Type 1.: 30 mL  Total IN: 60 mL    OUT:    Voided (mL): 200 mL  Total OUT: 200 mL    Total NET: -140 mL        ============================ LABS =========================                        8.2    22.87 )-----------( 259      ( 02 Sep 2021 00:41 )             26.6     -    137  |  100  |  21  ----------------------------<  109<H>  4.3   |  27  |  0.50    Ca    10.1      02 Sep 2021 00:41  Phos  3.7       Mg     2.0         TPro  8.2  /  Alb  3.1<L>  /  TBili  0.5  /  DBili  x   /  AST  59<H>  /  ALT  150<H>  /  AlkPhos  308<H>      LIVER FUNCTIONS - ( 02 Sep 2021 00:41 )  Alb: 3.1 g/dL / Pro: 8.2 g/dL / ALK PHOS: 308 U/L / ALT: 150 U/L / AST: 59 U/L / GGT: x             ABG - ( 02 Sep 2021 00:22 )  pH, Arterial: 7.41  pH, Blood: x     /  pCO2: 51    /  pO2: 169   / HCO3: 32    / Base Excess: 6.8   /  SaO2: 99.7                ======================Micro/Rad/Cardio=================  Culture: Reviewed   CXR: Reviewed  Echo: Reviewed  ======================================================  PAST MEDICAL & SURGICAL HISTORY:  CHF (congestive heart failure)    CAD (coronary artery disease)    Depression    Pleural effusion    History of 2019 novel coronavirus disease (COVID-19)  2020    Hemorrhoids    Bleeding hemorrhoids    Peripheral arterial disease    Claudication    BPH with urinary obstruction    ACC/AHA stage D systolic heart failure    Anticoagulation goal of INR 2.0 to 2.5    Falls    Clavicle fracture    CKD (chronic kidney disease), stage III    Iron deficiency anemia    H/O epistaxis    Vertigo    GI bleed    S/P TVR (tricuspid valve repair)    S/P ventricular assist device    S/P endoscopy      ====================ASSESSMENT ==============  Stage D Nonischemic Cardiomyopathy, Status Post HM2 on 2017    Cardiogenic shock  Hemodynamic instability   Acute hypoxemic respiratory failure  GI bleed , Status Post Enteroscopy   Anemia, in setting of melena   Chronic Kidney Disease  Stress hyperglycemia   C.diff positive on    Hypovolemic shock  Septic shock  Leukocytosis    Plan:  ====================== NEUROLOGY=====================  Nonfocal, continue to monitor neuro status   C/w mirtazapine and sertraline for depression  PT/Ambulate as tolerated    mirtazapine 7.5 milliGRAM(s) Oral daily  sertraline 100 milliGRAM(s) Oral daily    ==================== RESPIRATORY======================  Acute hypoxemic respiratory failure s/p #8 shiley trach on , continue TC as tolerated (TC since )  Continue close monitoring of respiratory rate and breathing pattern, following of ABG’s with A-line monitoring, continuous pulse oximetry monitoring.   Moderate secretions, continue suctioning prn, pulmonary toileting    ====================CARDIOVASCULAR==================  Stage D Nonischemic Cardiomyopathy, Status Post HM2 on 2017; LVAD settings 9200, flow 5.2  TTE : reveals at least moderate MR, mild AI, severe global LV syst dysfxn, dec RV fxn   Continue invasive hemodynamic monitoring   Propranolol for rate control of HR 2/2 Hyperthyroidism, titrate as tolerated    propranolol 20 milliGRAM(s) Oral every 6 hours    ===================HEMATOLOGIC/ONC ===================  Acute blood loss anemia, monitor H&H/Plts    Continue monitor LDH daily   off systemic anticoagulation/ASA 2/2 GIB    heparin   Injectable 5000 Unit(s) SubCutaneous every 8 hours for VTE prophylaxis     ===================== RENAL =========================  Continue to monitor I/Os, BUN/Creatinine, and urine output.   Even fluid balance. Off diuretics.  Replete lytes PRN. Keep K> 4 and Mg >2.     ==================== GASTROINTESTINAL===================  S/p cholecystostomy tube placed on : with IR with -60cc of black bile, will monitor site closely.   Recent emesis on  in setting of abdominal pain, as per GI likely component of illeus given critical illness   CTA A/P : Evaluation of the mesenteric vessels is limited by streak artifact from LVAD. There appears to be severe stenosis of the proximal SMA; abdominal mesenteric doppler is recommended for further evaluation. 2.  No small bowel findings to suggest acute mesenteric ischemia. 3.  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  Mesenteric duplex on 8/15 borderline stenosis of proximal SMA   Emesis x1 yesterday, no episodes overnight, continue TFs @ 30cc/hr, increased 5cc daily as tolerated   Plan to get abdominal XR to ensure feeding tube is post pyloric today   CT Abd/Pel on  without acute process   Reglan for gut motility  Zofran PRN nausea/vomiting        multiple electrolytes Injection Type 1 1000 milliLiter(s) (30 mL/Hr) IV Continuous <Continuous>  multivitamin 1 Tablet(s) Oral daily  pantoprazole  Injectable 40 milliGRAM(s) IV Push every 12 hours for GI pp x  simethicone 80 milliGRAM(s) Chew every 8 hours PRN Gas  thiamine 100 milliGRAM(s) Oral daily  metoclopramide Injectable 10 milliGRAM(s) IV Push every 8 hours  ondansetron Injectable 4 milliGRAM(s) IV Push every 6 hours PRN Nausea and/or Vomiting    =======================    ENDOCRINE  =====================  Stress hyperglycemia       - Continue glucose control with admelog sliding scale and NPH     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 - Free T4 remains elevated >7.9   Per endocrine      - Thyroid US when trach is out      - Continue with hydrocortisone 50q8      - Propanolol as above for optimal T4-->T3 conversion, will increase to 40q6 today per recommendations      - c/w Methimazole       - F/u thyroid studies and d/w endocrinology       hydrocortisone sodium succinate Injectable 50 milliGRAM(s) IV Push every 8 hours  insulin lispro (ADMELOG) corrective regimen sliding scale   SubCutaneous every 6 hours  insulin NPH human recombinant 12 Unit(s) SubCutaneous <User Schedule>  insulin NPH human recombinant 10 Unit(s) SubCutaneous <User Schedule>  methimazole 20 milliGRAM(s) Oral <User Schedule>    ========================INFECTIOUS DISEASE================  Hypothermic this AM, 35.7C with increasing WBC 13.59->22.87 (pending repeat test)  Recultured, will f/u results and plan to restart cefepime in setting of concern for aspiration   Continue trending WBC and monitoring fever curve   Culture from sybil drain on : NGTD      Patient requires continuous monitoring with bedside rhythm monitoring, pulse ox monitoring, and intermittent blood gas analysis. Care plan discussed with ICU care team. Patient remained critical and at risk for life threatening decompensation.     By signing my name below, I, Agnieszka Cherry, attest that this documentation has been prepared under the direction and in the presence of Evelia Pierce NP   Electronically signed: Romel Shaffer, 21 @ 09:04    I, Evelia Pierce, personally performed the services described in this documentation. all medical record entries made by the romel were at my direction and in my presence. I have reviewed the chart and agree that the record reflects my personal performance and is accurate and complete  Electronically signed: Evelia Pierce NP

## 2021-09-02 NOTE — PROGRESS NOTE ADULT - SUBJECTIVE AND OBJECTIVE BOX
Subjective:  Episode of emesis overnight and hypothermia with elevated WBC  Reports stable abdominal pain, unchanged from yesterday  Tube feeds at 30 ml/hr    Medications:  cefepime   IVPB 1000 milliGRAM(s) IV Intermittent every 8 hours  dextrose 40% Gel 15 Gram(s) Oral once  dextrose 5%. 1000 milliLiter(s) IV Continuous <Continuous>  dextrose 5%. 1000 milliLiter(s) IV Continuous <Continuous>  dextrose 50% Injectable 25 Gram(s) IV Push once  dextrose 50% Injectable 12.5 Gram(s) IV Push once  dextrose 50% Injectable 25 Gram(s) IV Push once  glucagon  Injectable 1 milliGRAM(s) IntraMuscular once  heparin   Injectable 5000 Unit(s) SubCutaneous every 8 hours  hydrocortisone sodium succinate Injectable 50 milliGRAM(s) IV Push every 8 hours  insulin lispro (ADMELOG) corrective regimen sliding scale   SubCutaneous every 6 hours  insulin NPH human recombinant 12 Unit(s) SubCutaneous <User Schedule>  insulin NPH human recombinant 10 Unit(s) SubCutaneous <User Schedule>  methimazole 20 milliGRAM(s) Oral <User Schedule>  metoclopramide Injectable 10 milliGRAM(s) IV Push every 8 hours  mirtazapine 7.5 milliGRAM(s) Oral daily  multiple electrolytes Injection Type 1 1000 milliLiter(s) IV Continuous <Continuous>  multivitamin 1 Tablet(s) Oral daily  ondansetron Injectable 4 milliGRAM(s) IV Push every 6 hours PRN  pantoprazole  Injectable 40 milliGRAM(s) IV Push every 12 hours  propranolol 40 milliGRAM(s) Oral every 6 hours  sertraline 100 milliGRAM(s) Oral daily  simethicone 80 milliGRAM(s) Chew every 8 hours PRN  thiamine 100 milliGRAM(s) Oral daily    Vitals:  T(C): 35.9 (21 @ 12:00), Max: 36.3 (21 @ 18:00)  HR: 110 (21 @ 14:00) (88 - 132)  BP: --  BP(mean): --  RR: 44 (21 @ 14:00) (17 - 56)  SpO2: 86% (21 @ 14:00) (84% - 100%)    Daily     Daily Weight in k.2 (02 Sep 2021 00:00)        I&O's Summary    01 Sep 2021 07:01  -  02 Sep 2021 07:00  --------------------------------------------------------  IN: 1610 mL / OUT: 1496 mL / NET: 114 mL    02 Sep 2021 07:01  -  02 Sep 2021 14:25  --------------------------------------------------------  IN: 360 mL / OUT: 300 mL / NET: 60 mL        Physical Exam:  Appearance: No Acute Distress, frail appearing  HEENT: JVP non elevated, s/p trach  Cardiovascular: VAD hums  Respiratory: Clear to auscultation bilaterally  Gastrointestinal: Soft, mildly tender throughout and worse in RUQ  Skin: no skin lesions  Neurologic: Non-focal  Extremities: No LE edema, warm and well perfused  Psychiatry: A & O x 3, flat affect     LVAD interrogation   Flow: 5.6  Speed: 9200  PI: 5.8  Power: 5.7   Alarms: none  Changes to device programming: none         Labs:                        8.2    22.87 )-----------( 259      ( 02 Sep 2021 00:41 )             26.6         137  |  100  |  21  ----------------------------<  109<H>  4.3   |  27  |  0.50    Ca    10.1      02 Sep 2021 00:41  Phos  3.7       Mg     2.0         TPro  8.2  /  Alb  3.1<L>  /  TBili  0.5  /  DBili  x   /  AST  59<H>  /  ALT  150<H>  /  AlkPhos  308<H>                  Lactate Dehydrogenase, Serum: 216 U/L ( @ 00:41)  Lactate Dehydrogenase, Serum: 238 U/L ( @ 00:41)  Lactate Dehydrogenase, Serum: 185 U/L ( @ 00:44)

## 2021-09-02 NOTE — PROGRESS NOTE ADULT - ASSESSMENT
Assessment and Recommendation:   · Assessment	  Assessment and recommendation :  Recurrent Acute hypoxic respiratory Failure S/P tracheostomy on trach. collar  50% FI02,   Acute blood loss anemia S/P multiple  blood transfusion   S/P septic shock off vasopressin , levophed and off  Dobutamine   S/P cholecystostomy tube placement by IR    AF RVR back to regular sinus Rhythm   Non ischemic cardiomyopathy continue ACE inhibitor and B-Blockers   S/P Septic shock and cardiogenic shock   Stage D systolic heart failure S/P LVAD HM2   MH2 LVAD  with  TV Annuloplasty  hyponatremia fluid restriction improved   increase Wbcs on cefepime   Severe peripheral vascular disease   no evidence of significant mesenteric thrombosis   severe hyperglycemia on insulin coverage    hyperthyroidism on Methimazole 10mg TID   critical care polyneuropathy   Anemia of Acute blood Loss   severe protein caloric malnutrition   S/P blood and FFP transfusion   Chronic kidney disease stage III  failed speech  and swallow evaluation   resume  NGT feeding .tolerating it well   Decannulation ?  GI prophylaxis with PPI

## 2021-09-03 NOTE — PROGRESS NOTE ADULT - SUBJECTIVE AND OBJECTIVE BOX
RICKY HAMPTON  MRN-[id number]  Patient is a 66y old  Male who presents with a chief complaint of Anemia, Supratherapeutic INR, Dark Stools (02 Sep 2021 18:52)    HPI:  65M PMH ACC/AHA stage D HF due to NICM HM2 LVAD , TV annuloplasty ring 17 as destination therapy due to severe peripheral artery disease with significant stenosis  SIADH, Depression, CKD-3 with hyperkalemia, past E. coli UTIs, driveline drainage (21) and COVID-19 (back in 2020)  He was recently seen in clinic where he complained of abdominal pain and dark stools w constipation back in May. He presents to [facility] ER today weakness and fatigue, moderate and + Black stools for three days, on coumadin secondary to warfarin use in the setting of an LVAD. Patient has required transfusions for GIB in the past. Mostly recently back in 2021 pt had anemia with dark stools. No interventions was done at that time. However Last Endoscopy was done in 2020 (negative). Today labs show patient is anemic with H/H of 4.5/16.3,. INR is 8.84 MAP in the 90s, Temp 35.1. He denies any chest pain, shortness of breath, dizziness, abd pain, nausea or vomiting.       (2021 16:57)      Surgery/Hospital Course:   admit for melena w/ anemia, INR 8.84   6/15 Capsul study (+) for small bowel bleed, balloon endoscopy (old blood in prox ileum); post EGD - septic w/ L opacity, re-intubated for concern for aspiration, TTE (Mod MR, decrease biV w/ interventricular septum boweing towards R)   bronch    +C Diff    TC    CT C/A/P: Fluid filled colon which may be 2/2 rapid transit. Small bilateral pleffs with associates. Compressive atelectasis New ISABELLE & LLL  parenchymal opacities, suspicious for pneumonia. 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    #8 Shiley trach at bedside    CPAP trials    LVAD speed increased to 9200   Bronch; Central line dc'ed   TC since , continue as tolerated. Patient transferred to SDU.    Cont PT, no trach downsize today(#8cuffed)/ Anticoagulation/ Continue TF, speech f/u/ Vanco taper    oob to chair  INR  2.47  5 mg coumadin     increased lop to 25 q8  per HF   INR today 2.64.  H&H 7.3 this AM.  Will repeat CBC at noon, and will send stool guaiac Patient with persistent abdominal tenderness, rate of tube feeds decreased.  No nausea/vomiting.     INR today 2.4H&H 9.1.6 low flow overnight /N&V  NPO resolved for capsule study  Coumadin on hold refusing Tube feeds on D5 1/2 normal  @50 cc/hr   INR 2.69  H&H 7..1 refusing Tube feeds on D5 1/2 normal  @50 cc/hr. This am + BM Anusha Oneill HF  aware- PRBC x1  GI team consulted -  NPO  plan on study in am-  D/w Dr Cadet Patient  to return to CTU for further management; 1PRBCS    Post op INR 2.2 today.  No bleeding. BC + for SM.  Pt is hypotensive requiring pressor and inotropic support.  ID follow up today on Cefepime will follow.   R PTC for PTX    CT C/A/P: sub q emphysema in R chest wall, GGO RUL, small ascites CTH negative; Abd US: GB thickening, pericholecystic fluid     Perchole drain in place continues to drain total output overnight 133.  Fever today 38.8 given tylenol.  Will repeat BC now.     duplex LE negative    Patient with persistent abdominal pain, refusing tube feeds and medications, Psych consulted   CTA A/P ordered to r/o mesenteric ischemia 2/2 persistent anorexia, nausea, vomiting. Revealed:  Evaluation of the mesenteric vessels is limited by streak artifact from LVAD. There appears to be severe stenosis of the proximal SMA; abdominal mesenteric doppler is recommended for further evaluation. 2.  No small bowel findings to suggest acute mesenteric ischemia. 3.  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  8/15: Cultures resulted BC 1/2 +GPC in clusters, SC enterobacter; mesenteric duplex: borderline stenosis of proximal SMA   : CT C/A/P noncon: Nondular opacities in R lung apex w/cavitation, abd nl  :  Continue current care, treatment of thyrotoxicosis with medications as per endocrine, d/c ABX as per team. Ambulate with PT today, continue respiratory and nutritional care.    Today:  Pt had one episode of emesis overnight resulting in tube feeds being held. Will evaluate pt for possible cholecystectomy    REVIEW OF SYSTEMS:  Patient speaks over trach   Gen: No fever  EYES/ENT: No visual changes;  No vertigo or throat pain   NECK: No pain   RES:  No shortness of breath or Cough  CARD: No chest pain   GI: No abdominal pain  : No dysuria  NEURO: No weakness; + lower extremity pain  SKIN: No itching, rashes     ICU Vital Signs Last 24 Hrs  T(C): 37 (03 Sep 2021 12:00), Max: 37 (03 Sep 2021 12:00)  T(F): 98.6 (03 Sep 2021 12:00), Max: 98.6 (03 Sep 2021 12:00)  HR: 106 (03 Sep 2021 11:00) (87 - 112)  BP: --  BP(mean): --  ABP: 86/66 (03 Sep 2021 11:00) (68/56 - 258/251)  ABP(mean): 76 (03 Sep 2021 11:00) (62 - 250)  RR: 20 (03 Sep 2021 11:00) (0 - 53)  SpO2: 99% (03 Sep 2021 11:00) (86% - 100%)      Physical Exam:  Gen:  Awake and alert, Sitting in chair in NAD.  Psych: Mood greatly improved but waxes and wanes, more interactive and participating in care. Would like to be more included in bedside rounding.  CNS:  intact, nonfocal, responds to all commands  Neck: no JVD, +TC   RES : course breath sounds, no wheezing, moderate tan secretions able to cough most up on own         CVS: +LVAD hum   Abd: Soft, RUQ tenderness by perc sybil site. Sybil drain with bilious fluid. Positive BS throughout.  Skin: No rash  Ext:  no edema    ============================I/O===========================   I&O's Detail    02 Sep 2021 07:01  -  03 Sep 2021 07:00  --------------------------------------------------------  IN:    Miscellaneous Tube Feedin mL    multiple electrolytes Injection Type 1.: 720 mL  Total IN: 1350 mL    OUT:    Drain (mL): 300 mL    Voided (mL): 400 mL  Total OUT: 700 mL    Total NET: 650 mL      03 Sep 2021 07:01  -  03 Sep 2021 13:00  --------------------------------------------------------  IN:    Miscellaneous Tube Feedin mL    multiple electrolytes Injection Type 1.: 150 mL  Total IN: 210 mL    OUT:    Voided (mL): 250 mL  Total OUT: 250 mL    Total NET: -40 mL        ============================ LABS =========================                        8.3    10.05 )-----------( 224      ( 03 Sep 2021 00:30 )             27.7         135  |  100  |  23  ----------------------------<  198<H>  4.4   |  28  |  0.47<L>    Ca    9.9      03 Sep 2021 00:30  Phos  2.9       Mg     1.8         TPro  8.0  /  Alb  3.2<L>  /  TBili  0.5  /  DBili  x   /  AST  64<H>  /  ALT  149<H>  /  AlkPhos  311<H>      LIVER FUNCTIONS - ( 03 Sep 2021 00:30 )  Alb: 3.2 g/dL / Pro: 8.0 g/dL / ALK PHOS: 311 U/L / ALT: 149 U/L / AST: 64 U/L / GGT: x             ABG - ( 03 Sep 2021 03:37 )  pH, Arterial: 7.39  pH, Blood: x     /  pCO2: 54    /  pO2: 148   / HCO3: 33    / Base Excess: 6.8   /  SaO2: 99.6                ======================Micro/Rad/Cardio=================  Culture: Reviewed   CXR: Reviewed  Echo:Reviewed  ======================================================  PAST MEDICAL & SURGICAL HISTORY:  CHF (congestive heart failure)    CAD (coronary artery disease)    Depression    Pleural effusion    History of  novel coronavirus disease (COVID-19)  2020    Hemorrhoids    Bleeding hemorrhoids    Peripheral arterial disease    Claudication    BPH with urinary obstruction    ACC/AHA stage D systolic heart failure    Anticoagulation goal of INR 2.0 to 2.5    Falls    Clavicle fracture    CKD (chronic kidney disease), stage III    Iron deficiency anemia    H/O epistaxis    Vertigo    GI bleed    S/P TVR (tricuspid valve repair)    S/P ventricular assist device    S/P endoscopy      ====================ASSESSMENT ==============  Stage D Nonischemic Cardiomyopathy, Status Post HM2 on 2017    Cardiogenic shock  Hemodynamic instability   Acute hypoxemic respiratory failure  GI bleed , Status Post Enteroscopy   Anemia, in setting of melena   Chronic Kidney Disease  Stress hyperglycemia   C.diff positive on    Hypovolemic shock  Septic shock  Leukocytosis    Plan:  ====================== NEUROLOGY=====================  Nonfocal, continue to monitor neuro status   C/w mirtazapine and sertraline for depression  PT/Ambulate as tolerated    mirtazapine 7.5 milliGRAM(s) Oral daily  sertraline 100 milliGRAM(s) Oral daily  ==================== RESPIRATORY======================  Acute hypoxemic respiratory failure s/p #8 betzaida trach on , continue TC as tolerated (TC since )  Continue close monitoring of respiratory rate and breathing pattern, following of ABG’s with A-line monitoring, continuous pulse oximetry monitoring.   Moderate secretions, continue suctioning prn, pulmonary toileting     ====================CARDIOVASCULAR==================  Stage D Nonischemic Cardiomyopathy, Status Post HM2 on 2017; LVAD settings 9200, flow 5  TTE : reveals at least moderate MR, mild AI, severe global LV syst dysfxn, dec RV fxn   Continue invasive hemodynamic monitoring   Propranolol for rate control of HR 2/2 Hyperthyroidism, titrate as tolerated    propranolol 40 milliGRAM(s) Oral every 6 hours  ===================HEMATOLOGIC/ONC ===================  Acute blood loss anemia, monitor H&H/Plts    Continue monitor LDH daily   off systemic anticoagulation/ASA 2/2 GIB  Continue Heparin for venous thromboembolism prophylaxis.     heparin   Injectable 5000 Unit(s) SubCutaneous every 8 hours  ===================== RENAL =========================  Continue to monitor I/Os, BUN/Creatinine, and urine output.   Goal net negative fluid balance. Replete lytes PRN. Keep K> 4 and Mg >2.   ==================== GASTROINTESTINAL===================  S/p cholecystostomy tube placed on : with IR with -60cc of black bile, will monitor site closely.   Recent emesis on  in setting of abdominal pain, as per GI likely component of illeus given critical illness   CTA A/P : Evaluation of the mesenteric vessels is limited by streak artifact from LVAD. There appears to be severe stenosis of the proximal SMA; abdominal mesenteric doppler is recommended for further evaluation. 2.  No small bowel findings to suggest acute mesenteric ischemia. 3.  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  Mesenteric duplex on 8/15 borderline stenosis of proximal SMA   CT Abd/Pel on  without acute process   Reglan for gut motility  Zofran PRN nausea/vomiting    Pt had one episode of emesis overnight resulting in tube feeds being held. Will evaluate pt for possible cholecystectomy    dextrose 5%. 1000 milliLiter(s) (50 mL/Hr) IV Continuous <Continuous>  dextrose 5%. 1000 milliLiter(s) (100 mL/Hr) IV Continuous <Continuous>  multiple electrolytes Injection Type 1 1000 milliLiter(s) (30 mL/Hr) IV Continuous <Continuous>  multivitamin 1 Tablet(s) Oral daily  pantoprazole  Injectable 40 milliGRAM(s) IV Push every 12 hours  simethicone 80 milliGRAM(s) Chew every 8 hours PRN Gas  thiamine 100 milliGRAM(s) Oral daily  metoclopramide Injectable 10 milliGRAM(s) IV Push every 8 hours  ondansetron Injectable 4 milliGRAM(s) IV Push every 6 hours PRN Nausea and/or Vomiting  =======================    ENDOCRINE  =====================  Stress hyperglycemia       - Continue glucose control with admelog sliding scale and NPH     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 - Free T4 remains elevated >7.9   Per endocrine      - Thyroid US when trach is out      - Continue with hydrocortisone 50q8      - Propanolol as above for optimal T4-->T3 conversion      - c/w Methimazole       - F/u thyroid studies and d/w endocrinology     dextrose 40% Gel 15 Gram(s) Oral once  dextrose 50% Injectable 25 Gram(s) IV Push once  dextrose 50% Injectable 12.5 Gram(s) IV Push once  dextrose 50% Injectable 25 Gram(s) IV Push once  glucagon  Injectable 1 milliGRAM(s) IntraMuscular once  hydrocortisone sodium succinate Injectable 50 milliGRAM(s) IV Push every 8 hours  insulin lispro (ADMELOG) corrective regimen sliding scale   SubCutaneous every 6 hours  insulin NPH human recombinant 12 Unit(s) SubCutaneous <User Schedule>  insulin NPH human recombinant 10 Unit(s) SubCutaneous <User Schedule>  methimazole 20 milliGRAM(s) Oral <User Schedule>    ========================INFECTIOUS DISEASE================  Afebrile, WBC currently 10.05  Continue trending WBC and monitoring fever curve   Culture from sybil drain on : NGTD    Patient requires continuous monitoring with bedside rhythm monitoring, pulse ox monitoring, and intermittent blood gas analysis. Care plan discussed with ICU care team. Patient remained critical and at risk for life threatening decompensation.     By signing my name below, I, Emily Roa, attest that this documentation has been prepared under the direction and in the presence of Evelia Pierce NP   Electronically signed: Emily Roa Scribe, 21 @ 13:00    I, Evelia Pierce NP  , personally performed the services described in this documentation. all medical record entries made by the luisibmel were at my direction and in my presence. I have reviewed the chart and agree that the record reflects my personal performance and is accurate and complete  Electronically signed: Evelia Pierce NP        RICKY HAMPTON  MRN-[id number]  Patient is a 66y old  Male who presents with a chief complaint of Anemia, Supratherapeutic INR, Dark Stools (02 Sep 2021 18:52)    HPI:  65M PMH ACC/AHA stage D HF due to NICM HM2 LVAD , TV annuloplasty ring 17 as destination therapy due to severe peripheral artery disease with significant stenosis  SIADH, Depression, CKD-3 with hyperkalemia, past E. coli UTIs, driveline drainage (21) and COVID-19 (back in 2020)  He was recently seen in clinic where he complained of abdominal pain and dark stools w constipation back in May. He presents to [facility] ER today weakness and fatigue, moderate and + Black stools for three days, on coumadin secondary to warfarin use in the setting of an LVAD. Patient has required transfusions for GIB in the past. Mostly recently back in 2021 pt had anemia with dark stools. No interventions was done at that time. However Last Endoscopy was done in 2020 (negative). Today labs show patient is anemic with H/H of 4.5/16.3,. INR is 8.84 MAP in the 90s, Temp 35.1. He denies any chest pain, shortness of breath, dizziness, abd pain, nausea or vomiting.       (2021 16:57)      Surgery/Hospital Course:   admit for melena w/ anemia, INR 8.84   6/15 Capsul study (+) for small bowel bleed, balloon endoscopy (old blood in prox ileum); post EGD - septic w/ L opacity, re-intubated for concern for aspiration, TTE (Mod MR, decrease biV w/ interventricular septum boweing towards R)   bronch    +C Diff    TC    CT C/A/P: Fluid filled colon which may be 2/2 rapid transit. Small bilateral pleffs with associates. Compressive atelectasis New ISABELLE & LLL  parenchymal opacities, suspicious for pneumonia. 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    #8 Shiley trach at bedside    CPAP trials    LVAD speed increased to 9200   Bronch; Central line dc'ed   TC since , continue as tolerated. Patient transferred to SDU.    Cont PT, no trach downsize today(#8cuffed)/ Anticoagulation/ Continue TF, speech f/u/ Vanco taper    oob to chair  INR  2.47  5 mg coumadin     increased lop to 25 q8  per HF   INR today 2.64.  H&H 7.3 this AM.  Will repeat CBC at noon, and will send stool guaiac Patient with persistent abdominal tenderness, rate of tube feeds decreased.  No nausea/vomiting.     INR today 2.4H&H 9.1.6 low flow overnight /N&V  NPO resolved for capsule study  Coumadin on hold refusing Tube feeds on D5 1/2 normal  @50 cc/hr   INR 2.69  H&H 7..1 refusing Tube feeds on D5 1/2 normal  @50 cc/hr. This am + BM Anusha Oneill HF  aware- PRBC x1  GI team consulted -  NPO  plan on study in am-  D/w Dr Cadet Patient  to return to CTU for further management; 1PRBCS    Post op INR 2.2 today.  No bleeding. BC + for SM.  Pt is hypotensive requiring pressor and inotropic support.  ID follow up today on Cefepime will follow.   R PTC for PTX    CT C/A/P: sub q emphysema in R chest wall, GGO RUL, small ascites CTH negative; Abd US: GB thickening, pericholecystic fluid     Perchole drain in place continues to drain total output overnight 133.  Fever today 38.8 given tylenol.  Will repeat BC now.     duplex LE negative    Patient with persistent abdominal pain, refusing tube feeds and medications, Psych consulted   CTA A/P ordered to r/o mesenteric ischemia 2/2 persistent anorexia, nausea, vomiting. Revealed:  Evaluation of the mesenteric vessels is limited by streak artifact from LVAD. There appears to be severe stenosis of the proximal SMA; abdominal mesenteric doppler is recommended for further evaluation. 2.  No small bowel findings to suggest acute mesenteric ischemia. 3.  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  8/15: Cultures resulted BC 1/2 +GPC in clusters, SC enterobacter; mesenteric duplex: borderline stenosis of proximal SMA   : CT C/A/P noncon: Nondular opacities in R lung apex w/cavitation, abd nl  :  Continue current care, treatment of thyrotoxicosis with medications as per endocrine, d/c ABX as per team. Ambulate with PT today, continue respiratory and nutritional care.    24 hour events:   Pt had one episode of emesis overnight resulting in tube feeds being held. Resumed this AM @ 30ml, Will evaluate pt for possible cholecystectomy with gen surg  Yesterday propanolol increased per endo recs    REVIEW OF SYSTEMS:  Patient speaks over trach   Gen: No fever, leg pain improved  EYES/ENT: No visual changes;  No vertigo or throat pain   NECK: No pain   RES:  No shortness of breath or Cough  CARD: No chest pain   GI: +abdominal pain, denies nausea at this time  : No dysuria  NEURO: No weakness; + lower extremity pain, improved today  SKIN: No itching, rashes     ICU Vital Signs Last 24 Hrs  T(C): 37 (03 Sep 2021 12:00), Max: 37 (03 Sep 2021 12:00)  T(F): 98.6 (03 Sep 2021 12:00), Max: 98.6 (03 Sep 2021 12:00)  HR: 106 (03 Sep 2021 11:00) (87 - 112)  BP: --  BP(mean): --  ABP: 86/66 (03 Sep 2021 11:00) (68/56 - 258/251)  ABP(mean): 76 (03 Sep 2021 11:00) (62 - 250)  RR: 20 (03 Sep 2021 11:00) (0 - 53)  SpO2: 99% (03 Sep 2021 11:00) (86% - 100%)      Physical Exam:  Gen:  Awake and alert, Sitting in chair in NAD.  Psych: Mood greatly improved but waxes and wanes, more interactive and participating in care. Would like to be more included in bedside rounding.  CNS:  intact, nonfocal, responds to all commands  Neck: no JVD, +TC   RES : course breath sounds, no wheezing, moderate tan secretions able to cough most up on own         CVS: +LVAD hum   Abd: Soft, RUQ tenderness by perc sybil site. Sybil drain with bilious fluid. Positive BS throughout.  Skin: No rash  Ext:  no edema    ============================I/O===========================   I&O's Detail    02 Sep 2021 07:01  -  03 Sep 2021 07:00  --------------------------------------------------------  IN:    Miscellaneous Tube Feedin mL    multiple electrolytes Injection Type 1.: 720 mL  Total IN: 1350 mL    OUT:    Drain (mL): 300 mL    Voided (mL): 400 mL  Total OUT: 700 mL    Total NET: 650 mL      03 Sep 2021 07:01  -  03 Sep 2021 13:00  --------------------------------------------------------  IN:    Miscellaneous Tube Feedin mL    multiple electrolytes Injection Type 1.: 150 mL  Total IN: 210 mL    OUT:    Voided (mL): 250 mL  Total OUT: 250 mL    Total NET: -40 mL        ============================ LABS =========================                        8.3    10.05 )-----------( 224      ( 03 Sep 2021 00:30 )             27.7         135  |  100  |  23  ----------------------------<  198<H>  4.4   |  28  |  0.47<L>    Ca    9.9      03 Sep 2021 00:30  Phos  2.9       Mg     1.8         TPro  8.0  /  Alb  3.2<L>  /  TBili  0.5  /  DBili  x   /  AST  64<H>  /  ALT  149<H>  /  AlkPhos  311<H>      LIVER FUNCTIONS - ( 03 Sep 2021 00:30 )  Alb: 3.2 g/dL / Pro: 8.0 g/dL / ALK PHOS: 311 U/L / ALT: 149 U/L / AST: 64 U/L / GGT: x             ABG - ( 03 Sep 2021 03:37 )  pH, Arterial: 7.39  pH, Blood: x     /  pCO2: 54    /  pO2: 148   / HCO3: 33    / Base Excess: 6.8   /  SaO2: 99.6                ======================Micro/Rad/Cardio=================  Culture: Reviewed   CXR: Reviewed  Echo: Reviewed  ======================================================  PAST MEDICAL & SURGICAL HISTORY:  CHF (congestive heart failure)    CAD (coronary artery disease)    Depression    Pleural effusion    History of 2019 novel coronavirus disease (COVID-19)  2020    Hemorrhoids    Bleeding hemorrhoids    Peripheral arterial disease    Claudication    BPH with urinary obstruction    ACC/AHA stage D systolic heart failure    Anticoagulation goal of INR 2.0 to 2.5    Falls    Clavicle fracture    CKD (chronic kidney disease), stage III    Iron deficiency anemia    H/O epistaxis    Vertigo    GI bleed    S/P TVR (tricuspid valve repair)    S/P ventricular assist device    S/P endoscopy      ====================ASSESSMENT ==============  Stage D Nonischemic Cardiomyopathy, Status Post HM2 on 2017    Cardiogenic shock  Hemodynamic instability   Acute hypoxemic respiratory failure  GI bleed , Status Post Enteroscopy   Anemia, in setting of melena   Chronic Kidney Disease  Stress hyperglycemia   C.diff positive on    Hypovolemic shock  Septic shock  Leukocytosis    Plan:  ====================== NEUROLOGY=====================  Nonfocal, continue to monitor neuro status   C/w mirtazapine and sertraline for depression  PT/Ambulate as tolerated    mirtazapine 7.5 milliGRAM(s) Oral daily  sertraline 100 milliGRAM(s) Oral daily  ==================== RESPIRATORY======================  Acute hypoxemic respiratory failure s/p #8 shiley trach on , continue TC as tolerated (TC since )  Continue close monitoring of respiratory rate and breathing pattern, following of ABG’s with A-line monitoring, continuous pulse oximetry monitoring.   Moderate secretions, continue suctioning prn, pulmonary toileting     ====================CARDIOVASCULAR==================  Stage D Nonischemic Cardiomyopathy, Status Post HM2 on 2017; LVAD settings 9200, flow 5  TTE : reveals at least moderate MR, mild AI, severe global LV syst dysfxn, dec RV fxn   Continue invasive hemodynamic monitoring   Propranolol for rate control of HR 2/2 Hyperthyroidism, titrate as tolerated per Endo    propranolol 40 milliGRAM(s) Oral every 6 hours  ===================HEMATOLOGIC/ONC ===================  Acute blood loss anemia, monitor H&H/Plts    Continue monitor LDH daily   off systemic anticoagulation/ASA 2/2 GIB  Continue Heparin for venous thromboembolism prophylaxis.     heparin   Injectable 5000 Unit(s) SubCutaneous every 8 hours  ===================== RENAL =========================  Continue to monitor I/Os, BUN/Creatinine, and urine output.   Even fluid balance, currently off diuretics.  Replete lytes PRN. Keep K> 4 and Mg >2.     ==================== GASTROINTESTINAL===================  S/p cholecystostomy tube placed on : with IR with -60cc of black bile, will monitor site closely.   Recent emesis on  in setting of abdominal pain, as per GI likely component of illeus given critical illness   CTA A/P : Evaluation of the mesenteric vessels is limited by streak artifact from LVAD. There appears to be severe stenosis of the proximal SMA; abdominal mesenteric doppler is recommended for further evaluation. 2.  No small bowel findings to suggest acute mesenteric ischemia. 3.  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  Mesenteric duplex on 8/15 borderline stenosis of proximal SMA   CT Abd/Pel on  without acute process   Reglan for gut motility  Zofran PRN nausea/vomiting    Pt had one episode of emesis overnight resulting in tube feeds being held. Will evaluate pt for possible cholecystectomy with gen surg      multiple electrolytes Injection Type 1 1000 milliLiter(s) (30 mL/Hr) IV Continuous <Continuous>  multivitamin 1 Tablet(s) Oral daily  pantoprazole  Injectable 40 milliGRAM(s) IV Push every 12 hours  simethicone 80 milliGRAM(s) Chew every 8 hours PRN Gas  thiamine 100 milliGRAM(s) Oral daily  metoclopramide Injectable 10 milliGRAM(s) IV Push every 8 hours  ondansetron Injectable 4 milliGRAM(s) IV Push every 6 hours PRN Nausea and/or Vomiting  =======================    ENDOCRINE  =====================  Stress hyperglycemia       - Continue glucose control with admelog sliding scale and NPH     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 - Free T4 remains elevated   Per endocrine      - Thyroid US when trach is out      - Continue with hydrocortisone 50q8      - Propanolol as above for optimal T4-->T3 conversion      - c/w Methimazole       - F/u thyroid studies and d/w endocrinology       hydrocortisone sodium succinate Injectable 50 milliGRAM(s) IV Push every 8 hours  insulin lispro (ADMELOG) corrective regimen sliding scale   SubCutaneous every 6 hours  insulin NPH human recombinant 12 Unit(s) SubCutaneous <User Schedule>  insulin NPH human recombinant 10 Unit(s) SubCutaneous <User Schedule>  methimazole 20 milliGRAM(s) Oral <User Schedule>    ========================INFECTIOUS DISEASE================  Afebrile, WBC currently 10.05  Continue trending WBC and monitoring fever curve   Culture from sybil drain on : NGTD  Pancultured yesterday in setting of hypothermia and leukocytosis, f/u results c/f aspiration, Cefepime started, however normothermic today with WBC WNL, low procalcitonin, cefepime d/c and will continue to monitor off antibiotics  ID following, MD Prater, appreciate recommendations        Patient requires continuous monitoring with bedside rhythm monitoring, pulse ox monitoring, and intermittent blood gas analysis. Care plan discussed with ICU care team. Patient remained critical and at risk for life threatening decompensation.     By signing my name below, I, Emily Roa, attest that this documentation has been prepared under the direction and in the presence of Evelia Pierce NP   Electronically signed: Emily Roa Scribe, 21 @ 13:00    I, Evelia Pierce NP  , personally performed the services described in this documentation. all medical record entries made by the romel were at my direction and in my presence. I have reviewed the chart and agree that the record reflects my personal performance and is accurate and complete  Electronically signed: Evelia Pierce NP

## 2021-09-03 NOTE — PROGRESS NOTE ADULT - SUBJECTIVE AND OBJECTIVE BOX
INFECTIOUS DISEASES FOLLOW UP-- Anitra Prater  [phone]    This is a follow up note for this  66yMale with  Anemia    Acute cholecystitis        ROS:  CONSTITUTIONAL:  No fever, good appetite  CARDIOVASCULAR:  No chest pain or palpitations  RESPIRATORY:  No dyspnea  GASTROINTESTINAL:  No nausea, vomiting, diarrhea, or abdominal pain  GENITOURINARY:  No dysuria  NEUROLOGIC:  No headache,     Allergies    Amiodarone Hydrochloride (Other (Severe))    Intolerances        ANTIBIOTICS/RELEVANT:  antimicrobials    immunologic:    OTHER:  dextrose 40% Gel 15 Gram(s) Oral once  dextrose 5%. 1000 milliLiter(s) IV Continuous <Continuous>  dextrose 5%. 1000 milliLiter(s) IV Continuous <Continuous>  dextrose 50% Injectable 25 Gram(s) IV Push once  dextrose 50% Injectable 12.5 Gram(s) IV Push once  dextrose 50% Injectable 25 Gram(s) IV Push once  glucagon  Injectable 1 milliGRAM(s) IntraMuscular once  heparin   Injectable 5000 Unit(s) SubCutaneous every 8 hours  hydrocortisone sodium succinate Injectable 50 milliGRAM(s) IV Push every 8 hours  insulin lispro (ADMELOG) corrective regimen sliding scale   SubCutaneous every 6 hours  insulin NPH human recombinant 12 Unit(s) SubCutaneous <User Schedule>  insulin NPH human recombinant 10 Unit(s) SubCutaneous <User Schedule>  methimazole 20 milliGRAM(s) Oral <User Schedule>  metoclopramide Injectable 10 milliGRAM(s) IV Push every 8 hours  mirtazapine 7.5 milliGRAM(s) Oral daily  multiple electrolytes Injection Type 1 1000 milliLiter(s) IV Continuous <Continuous>  multivitamin 1 Tablet(s) Oral daily  ondansetron Injectable 4 milliGRAM(s) IV Push every 6 hours PRN  pantoprazole  Injectable 40 milliGRAM(s) IV Push every 12 hours  propranolol 40 milliGRAM(s) Oral every 6 hours  sertraline 100 milliGRAM(s) Oral daily  simethicone 80 milliGRAM(s) Chew every 8 hours PRN  thiamine 100 milliGRAM(s) Oral daily      Objective:  Vital Signs Last 24 Hrs  T(C): 36.7 (03 Sep 2021 16:00), Max: 37 (03 Sep 2021 12:00)  T(F): 98.1 (03 Sep 2021 16:00), Max: 98.6 (03 Sep 2021 12:00)  HR: 95 (03 Sep 2021 17:00) (87 - 106)  BP: --  BP(mean): --  RR: 26 (03 Sep 2021 17:00) (0 - 53)  SpO2: 100% (03 Sep 2021 17:00) (95% - 100%)    PHYSICAL EXAM:  Constitutional:no acute distress,frail  Eyes:ALONSO, EOMI  Ear/Nose/Throat: no oral lesions, trach, NG feeding tube	  Respiratory: clear BL  Cardiovascular: H3O7CKN sounds  Gastrointestinal:soft, (+) BS, no tenderness  VAd exit site CDI, cholecystotomy tube  Extremities:no e/e/c  No Lymphadenopathy  IV sites not inflammed.    LABS:                        8.3    10.05 )-----------( 224      ( 03 Sep 2021 00:30 )             27.7     09-03    135  |  100  |  23  ----------------------------<  198<H>  4.4   |  28  |  0.47<L>    Ca    9.9      03 Sep 2021 00:30  Phos  2.9     09-03  Mg     1.8     09-03    TPro  8.0  /  Alb  3.2<L>  /  TBili  0.5  /  DBili  x   /  AST  64<H>  /  ALT  149<H>  /  AlkPhos  311<H>  09-03          MICROBIOLOGY:            RECENT CULTURES:  09-02 @ 16:29  .Sputum Sputum  --  --  --    Normal Respiratory Bria present  --  09-02 @ 04:56  .Blood Blood-Peripheral  --  --  --    No growth to date.  --      RADIOLOGY & ADDITIONAL STUDIES:    < from: Xray Chest 1 View- PORTABLE-Urgent (Xray Chest 1 View- PORTABLE-Urgent .) (09.02.21 @ 15:28) >  9/2/2021 at 2:49 AM: Prior sternotomy. NG tube into the stomach. Prior valve replacement. Heart is mildly prominent. Increased markings in the right lung may represent pneumonia. Elevation of the right hemidiaphragm. Degenerative changes.    Lines in place unchanged. Heart is mildly enlarged. Previous noted increased markings of likely resolved and/or represent an artifact. Mild elevation of the right hemidiaphragm. Degenerative changes. Partially visualized LVAD device    IMPRESSION:    Lines in place unchanged.    < end of copied text >

## 2021-09-03 NOTE — PROGRESS NOTE ADULT - SUBJECTIVE AND OBJECTIVE BOX
RICKY JOINT  MRN#: [id number]  Subjective:  Pulmonary progress  : recurrent Acute hypoxic respiratory Failure ,aspiration pneumonia, NICM  , chart reviewed and H/O obtained radiological and Laboratory study reviewed patient Examined     65M PMH ACC/AHA stage D HF due to NICM HM2 LVAD , TV annuloplasty ring 17 as destination therapy due to severe peripheral artery disease with significant stenosis  SIADH, Depression, CKD-3 with hyperkalemia, past E. coli UTIs, driveline drainage (21) and COVID-19 (back in 2020)  He was recently seen in clinic where he complained of abdominal pain and dark stools w constipation back in May. He presents to [facility] ER today weakness and fatigue, moderate and + Black stools for three days, on coumadin secondary to warfarin use in the setting of an LVAD. Patient has required transfusions for GIB in the past. Mostly recently back in 2021 pt had anemia with dark stools. No interventions was done at that time. However Last Endoscopy was done in 2020 (negative). Today labs show patient is anemic with H/H of 4.5/16.3,. INR is 8.84 MAP in the 90s, Temp 35.1. He denies any chest pain, shortness of breath, dizziness, abd pain, nausea or vomiting. found to have  rectal bleeding underwent endoscopy ,old blood in the proximal ileum ,  develop sepsis with LL opacity given Antibiotics , Extubated , reintubated , Bronchoscopy on Zosyn for LL pneumonia  and Amiodarone S/P TV Annuloplasty , patient remain intubated on full ventilatory support .S/P multiple units of blood transfusion , remain on full ventilatory support on Precedex and propofol , new central IJ line , diarrhea C diff. +ve on po vancomycin and IV Flagyl,  mildly distended belly , fever start on cefepime 2gm q 8 hrs S/P tracheostomy .  new RT Subclavian central line continue on contact  isolation ,still diarrhea on C-diff antibiotics ENT follow up appreciated , trial of C-PAP as tolerated , , copious secretion from trach. site chest x ray left lower lobe pneumonia , tolerating trch. collar 50% FI02 still excessive secretion need pulmonary toilet , off Ancef antibiotic , no more diarrhea back on full support mechanical ventilator , chest x ray show improvement in LLL air space disease, more awake and responsive on tube feeding no more diarrhea , S/P  Ancef for bacteremia no fever ,ID follow up noted ,  no nausea or vomiting or diarrhea still very weak and tired , event note pulled NG tube now replaced , back on tube feeding ,still on po vancomycin , getting PT and OT at bed side , no plan for decannulation for now. , no more diarrhea receiving PT and OPT at bed side , minimal secretion from tracheostomy site , no SOB getting stronger , improve muscle tone patient transfer to monitor bed still on contact isolation for C-Difficel colitis on 50% FI02, NG tube clogged , and change to resume tube feeding still loose stool . H/H drop significantly require blood transfusion , most likely GI bleeding , IV heparin D/C , vomiting 200 cc of creamy color tube feeding on hold no sob, still melena monitor in the CTU possible capsule endoscopy , H/H is stable ., patient develop TR sided  pneumothorax require chest tube placement , RT IJ central line  placed , develop fever shaking chills , blood culture positive for serratia marcescens , start on cefepime .the patient  become hypoxic and hypotensive placed on full ventilatory support and Vasopressin , levophed and Dobutamine ,S/P blood transfusion on meropenem and vancomycin ,   , on and  off pressors , occasional agitation on Precedex .S/P IR cholecystostomy tube drainage placement in the RT upper Quadrant , resume anticoagulation chest x ray noted C-PAP trail lasted only for 2 hrs , new RT SC line and D/C RT IJ line , RT pig tail cathter has been removed , tolerating C-PAP trial placed on trach. collar 50% FI02 GI consultant noted on NG tube feeding as tolerated , develop AF RVR S/P  bolus Amiodarone  back to regular sinus Rhythm , Flat Affect depressed , back on tube feeding Vital AF at 60 cc/ hr .still intermittent abdominal pain , no fever saturation is accepted  back on full ventilatory support , tired and sleepy on round  .start  back on  tube feeding . tolerating it very well , no nausea or vomiting , good 02 saturation on trac-collar on methimazole for hyperthyroidism , no new C/O no plan for decannulation yet , electrolyte blood test is still pending .on respiratory isolation sputum culture is negative , increase WBC  improved on cefepime , sputum positive for enterococcus , condition is the same ,still C/O Abdominal pain , white count is improving , no chest pain or SOB , speech and swallow  evaluation appreciated , recommend continue NG tube feeding        (2021 16:57)    PAST MEDICAL & SURGICAL HISTORY:  CHF (congestive heart failure)    CAD (coronary artery disease)  Depression    Pleural effusion    History of 2019 novel coronavirus disease (COVID-19)  2020    Hemorrhoids    Bleeding hemorrhoids    Peripheral arterial disease    Claudication    BPH with urinary obstruction    ACC/AHA stage D systolic heart failure  Anticoagulation goal of INR 2.0 to 2.5    Falls    Clavicle fracture    CKD (chronic kidney disease), stage III    Iron deficiency anemia    H/O epistaxis    Vertigo    GI bleed    S/P TVR (tricuspid valve repair)    S/P ventricular assist device    S/P endoscopy    OBJECTIVE:  ICU Vital Signs Last 24 Hrs  T(C): 38.2 (2021 10:00), Max: 38.5 (2021 12:00)  T(F): 100.8 (2021 10:00), Max: 101.3 (2021 12:00)  HR: 65 (2021 10:00) (61 - 69)  BP: --  BP(mean): --  ABP: 105/67 (2021 10:00) (90/54 - 113/64)  ABP(mean): 77 (2021 10:00) (63 - 77)  RR: 20 (2021 10:00) (19 - 35)  SpO2: 99% (2021 10:00) (96% - 100%)       @ : @ 07:00  --------------------------------------------------------  IN: 2693.9 mL / OUT: 1415 mL / NET: 1278.9 mL     @ 07:  -   @ 10:49  --------------------------------------------------------  IN: 420.8 mL / OUT: 115 mL / NET: 305.8 mL  PHYSICAL EXAM:Daily   Elderly male S/P tracheostomy on trach. collar 50% FI02 ,  Daily Weight in k.4 (2021 00:00)  HEENT:     + NCAT  + EOMI  - Conjuctival edema   - Icterus   - Thrush   - ETT  + NGT/OGT  Neck:         + FROM  RT IJ  line JVD  - Nodes - Masses + Mid-line trachea + Tracheostomy  Chest:            normal A-P diameter    Lungs:          + CTA   + Rhonchi    - Rales    - Wheezing + Decreased  LT BS   - Dullness R L  Cardiac:       + S1 + S2    + RRR   - Irregular   - S3  - S4    - Murmurs   - Rub   - Hamman’s sign   Abdomen:    + BS  + Soft + Non-tender - Distended - Organomegaly - PEG .cholecystostomy tube in place  Extremities:   - Cyanosis U/L   - Clubbing  U/L  + LE/UE Edema   + Capillary refill    + Pulses   Neuro:        - Awake   -  Alert   - Confused   - Lethargic   + Sedated  + Generalized Weakness  Skin:        - Rashes    - Erythema   + Normal incisions   + IV sites intact          HOSPITAL MEDICATIONS: All medications reviewed and analyzed  MEDICATIONS  (STANDING):  amiodarone    Tablet 200 milliGRAM(s) Oral daily  chlorhexidine 0.12% Liquid 15 milliLiter(s) Oral Mucosa every 12 hours  chlorhexidine 2% Cloths 1 Application(s) Topical <User Schedule>  dexmedetomidine Infusion 0.5 MICROgram(s)/kG/Hr (9.81 mL/Hr) IV Continuous <Continuous>  dextrose 50% Injectable 50 milliLiter(s) IV Push every 15 minutes  heparin  Infusion 400 Unit(s)/Hr (12.5 mL/Hr) IV Continuous <Continuous>  Hydromorphone  Injectable 0.5 milliGRAM(s) IV Push once  insulin lispro (ADMELOG) corrective regimen sliding scale   SubCutaneous every 6 hours  pantoprazole  Injectable 40 milliGRAM(s) IV Push every 12 hours  piperacillin/tazobactam IVPB.. 3.375 Gram(s) IV Intermittent every 8 hours  propofol Infusion 20 MICROgram(s)/kG/Min (9.42 mL/Hr) IV Continuous <Continuous>  sodium chloride 0.9% lock flush 3 milliLiter(s) IV Push every 8 hours  sodium chloride 0.9%. 1000 milliLiter(s) (10 mL/Hr) IV Continuous <Continuous>    MEDICATIONS  (PRN):  acetaminophen    Suspension .. 650 milliGRAM(s) Oral every 6 hours PRN Temp greater or equal to 38C (100.4F)    LABS: All Lab data reviewed and analyzed                        8.3    10.05 )-----------( 224      ( 03 Sep 2021 00:30 )             27.7   09-    135  |  100  |  23  ----------------------------<  198<H>  4.4   |  28  |  0.47<L>    Ca    9.9      03 Sep 2021 00:30  Phos  2.9     -  Mg     1.8         TPro  8.0  /  Alb  3.2<L>  /  TBili  0.5  /  DBili  x   /  AST  64<H>  /  ALT  149<H>  /  AlkPhos  311<H>      Ca    10.1      02 Sep 2021 00:41  Phos  3.7       Mg     2.0         TPro  8.2  /  Alb  3.1<L>  /  TBili  0.5  /  DBili  x   /  AST  59<H>  /  ALT  150<H>  /  AlkPhos  308<H>      Ca    10.3      31 Aug 2021 00:44  Phos  2.6     -  Mg     1.8         TPro  8.3  /  Alb  3.2<L>  /  TBili  0.6  /  DBili  x   /  AST  70<H>  /  ALT  132<H>  /  AlkPhos  313<H>                                                                                                                      PTT - ( 2021 04:52 )  PTT:45.2 sec LIVER FUNCTIONS - ( 2021 00:42 )  Alb: 3.4 g/dL / Pro: 6.7 g/dL / ALK PHOS: 213 U/L / ALT: 15 U/L / AST: 24 U/L / GGT: x           RADIOLOGY: - Reviewed and analyzed RT Pig tail cathter  , LVAD HM2, CT scan of abdomen reviewed result noted

## 2021-09-03 NOTE — PROGRESS NOTE ADULT - PROBLEM SELECTOR PLAN 4
- serratia bacteremia and poss acalculous cholecystitis. B/c with gram + cocci and many enterobacter Carbapenem resistant strain and now s/p per dawn drain  - also with enterobacter from sputum culture 8/13  - d/c cefepime

## 2021-09-03 NOTE — PROGRESS NOTE ADULT - SUBJECTIVE AND OBJECTIVE BOX
GENERAL SURGERY PROGRESS NOTE      Subjective:  Patient endorsing generalized abdominal pain      Objective:    PE:  Gen: Laying in bed, in NAD  Resp: On trach collar  Abd: soft, Nondistended. Mildly TTP diffusely. Negative [place]. No rebound or guarding. Perc dawn in place with bilious output in drain    Vital Signs Last 24 Hrs  T(C): 36.7 (03 Sep 2021 16:00), Max: 37 (03 Sep 2021 12:00)  T(F): 98.1 (03 Sep 2021 16:00), Max: 98.6 (03 Sep 2021 12:00)  HR: 93 (03 Sep 2021 16:00) (87 - 106)  BP: --  BP(mean): --  RR: 18 (03 Sep 2021 16:09) (0 - 53)  SpO2: 100% (03 Sep 2021 16:09) (95% - 100%)    I&O's Detail    02 Sep 2021 07:01  -  03 Sep 2021 07:00  --------------------------------------------------------  IN:    Miscellaneous Tube Feedin mL    multiple electrolytes Injection Type 1.: 720 mL  Total IN: 1350 mL    OUT:    Drain (mL): 300 mL    Voided (mL): 400 mL  Total OUT: 700 mL    Total NET: 650 mL      03 Sep 2021 07:01  -  03 Sep 2021 16:50  --------------------------------------------------------  IN:    Miscellaneous Tube Feedin mL    multiple electrolytes Injection Type 1.: 270 mL  Total IN: 450 mL    OUT:    Voided (mL): 250 mL  Total OUT: 250 mL    Total NET: 200 mL          Daily     Daily Weight in k (03 Sep 2021 00:00)    MEDICATIONS  (STANDING):  dextrose 40% Gel 15 Gram(s) Oral once  dextrose 5%. 1000 milliLiter(s) (50 mL/Hr) IV Continuous <Continuous>  dextrose 5%. 1000 milliLiter(s) (100 mL/Hr) IV Continuous <Continuous>  dextrose 50% Injectable 25 Gram(s) IV Push once  dextrose 50% Injectable 12.5 Gram(s) IV Push once  dextrose 50% Injectable 25 Gram(s) IV Push once  glucagon  Injectable 1 milliGRAM(s) IntraMuscular once  heparin   Injectable 5000 Unit(s) SubCutaneous every 8 hours  hydrocortisone sodium succinate Injectable 50 milliGRAM(s) IV Push every 8 hours  insulin lispro (ADMELOG) corrective regimen sliding scale   SubCutaneous every 6 hours  insulin NPH human recombinant 12 Unit(s) SubCutaneous <User Schedule>  insulin NPH human recombinant 10 Unit(s) SubCutaneous <User Schedule>  methimazole 20 milliGRAM(s) Oral <User Schedule>  metoclopramide Injectable 10 milliGRAM(s) IV Push every 8 hours  mirtazapine 7.5 milliGRAM(s) Oral daily  multiple electrolytes Injection Type 1 1000 milliLiter(s) (30 mL/Hr) IV Continuous <Continuous>  multivitamin 1 Tablet(s) Oral daily  pantoprazole  Injectable 40 milliGRAM(s) IV Push every 12 hours  propranolol 40 milliGRAM(s) Oral every 6 hours  sertraline 100 milliGRAM(s) Oral daily  thiamine 100 milliGRAM(s) Oral daily    MEDICATIONS  (PRN):  ondansetron Injectable 4 milliGRAM(s) IV Push every 6 hours PRN Nausea and/or Vomiting  simethicone 80 milliGRAM(s) Chew every 8 hours PRN Gas      LABS:                        8.3    10.05 )-----------( 224      ( 03 Sep 2021 00:30 )             27.7         135  |  100  |  23  ----------------------------<  198<H>  4.4   |  28  |  0.47<L>    Ca    9.9      03 Sep 2021 00:30  Phos  2.9       Mg     1.8         TPro  8.0  /  Alb  3.2<L>  /  TBili  0.5  /  DBili  x   /  AST  64<H>  /  ALT  149<H>  /  AlkPhos  311<H>            RADIOLOGY & ADDITIONAL STUDIES:      CT Abdomen and Pelvis No Cont (21 @ 16:06)  CHEST:  LUNGS AND LARGE AIRWAYS: Status post tracheostomy. Secretions in the central airways. Mucoid impaction of theright lower lobe bronchi. Emphysema. Bibasilar subsegmental atelectasis. Tree-in-bud opacities in the right upper lobe, bilateral lower lobes, and right middle lobe. Nodular opacities in the right upper lobe some of which demonstrate central lucency/cavitation.  PLEURA: No pleural effusion.  VESSELS: Atherosclerotic changes.  HEART: Heart size is enlarged. No pericardial effusion. LVAD. Tricuspid valve repair.  MEDIASTINUM AND DON: No lymphadenopathy.  CHEST WALL AND LOWER NECK: Median sternotomy.    ABDOMEN AND PELVIS:  LIVER: Within normal limits.  BILE DUCTS: Normal caliber.  GALLBLADDER: Decompressed with percutaneous cholecystostomy tube.  SPLEEN: Within normal limits.  PANCREAS: Within normal limits.  ADRENALS: Within normal limits.  KIDNEYS/URETERS: Within normal limits.    BLADDER: Within normal limits.  REPRODUCTIVE ORGANS: Prostate is enlarged.    BOWEL: No bowel obstruction. Appendix is normal. Enteric tube with tip terminating in the stomach.  PERITONEUM: No ascites.  VESSELS: Atherosclerotic changes.  RETROPERITONEUM/LYMPH NODES: No lymphadenopathy.  ABDOMINAL WALL: Within normal limits.  BONES: Degenerative changes.    IMPRESSION:    Nodular opacities in the right lung apex with central lucency/cavitation which may be infectious or inflammatory in etiology. Bilateral tree-in-bud opacities and mucoid impaction in the right lower lobe airways.

## 2021-09-03 NOTE — PROGRESS NOTE ADULT - ASSESSMENT
66M PM ACC/AHA stage D HF due to NICM HM2 LVAD , TV annuloplasty ring 9/12/17 as destination therapy due to severe peripheral artery disease with significant stenosis  SIADH, Depression, CKD-3 with hyperkalemia, past E. coli UTIs, driveline drainage (1/7/21) and COVID-19, here initially for GIB prolonged hospital course, s/p tracheostomy, acalculous cholecystitis s/p perc dawn. Patient has persistent intermittent abdominal pain. Per CTU team, pt has recently been refusing tube feeds and is being evaluated for chronic mesenteric ischemia vs SMA syndrome.  8/13 bld cxs positive. Consulted for TPN. Prealb 11, prior a1c 5.6, tg 141. 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 Tolerating feeds but with intermittent abd pain. SLP eval recommending npo status.    -cont tube feeds- Vital 1.5 and advance to goal as tolerated (goal of 65 ml/hr) as per CTU team, no present plan to initiate TPN, will reassess need pending clinical course  -suspect uncontrolled hyperthyroidism contributing to metabolic issues ie. gi motility, hypercalcemia, tachycardia; further management as per endocrine  -h/o abdominal pain/vomiting- as above, zofran prn for nausea; reglan for gut motility  -hypothermia/leukocytosis- restarted abx, f/u cxs, care per ID  -anemia- rec iron supp when feasible  -psych, palliative care, and ethics input noted  -management as per CTU; will remain available as needed    plan discussed with Martínez, agreeable with above    TPN pager [phone], spectra [phone]  M-F 6A-4P, Weekends and holidays 8A-12P        66M PM ACC/AHA stage D HF due to NICM HM2 LVAD , TV annuloplasty ring 9/12/17 as destination therapy due to severe peripheral artery disease with significant stenosis  SIADH, Depression, CKD-3 with hyperkalemia, past E. coli UTIs, driveline drainage (1/7/21) and COVID-19, here initially for GIB prolonged hospital course, s/p tracheostomy, acalculous cholecystitis s/p perc dawn. Patient has persistent intermittent abdominal pain. Per CTU team, pt has recently been refusing tube feeds and is being evaluated for chronic mesenteric ischemia vs SMA syndrome.  8/13 bld cxs positive. Consulted for TPN. Prealb 11, prior a1c 5.6, tg 141. 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 Tolerating feeds but with intermittent abd pain. SLP eval recommending npo status.    -Cont tube feeds- Vital 1.5 and advance to goal as tolerated at 30cc/he now (goal of 65 ml/hr) as per CTU team, no present plan to initiate TPN, will reassess need pending clinical course  -suspect uncontrolled hyperthyroidism contributing to metabolic issues ie. gi motility, hypercalcemia, tachycardia; further management as per endocrine  -h/o abdominal pain/vomiting- as above, zofran prn for nausea; Reglan for gut motility  -Hypothermia/leukocytosis- restarted abx, f/u cxs, care per ID  -Anemia- rec iron supp when feasible  -Psych, palliative care, and ethics input noted  -Management as per CTU; will remain available as needed    plan discussed with Dr. Soliz, agreeable with above    TPN pager [phone], spectra [phone]  M-F 6A-4P, Weekends and holidays 8A-12P

## 2021-09-03 NOTE — PROGRESS NOTE ADULT - ASSESSMENT
65 YO M with a history of stage D NICM s/p HM2 on 9/2017 as DT (due to severe PAD) with TV ring, prior COVID-19 infection 4/2020, recurrent syncope post LVAD s/p ILR, and chronic abdominal pain with prior negative workup who was admitted 6/14/21 with symptomatic anemia with Hgb 4.5 in setting of INR 8.8 without hemodynamic instability and has since had a protracted hospitalization. He was transfused and underwent VCE which showed active bleeding in the mid small bowel but subsequent enteroscopy 6/15 did not reveal any active bleeding. He acutely decompensated after procedure with fever/hypertension, low flow alarms, and pulmonary infiltrate with hypoxia requiring intubation from probable aspiration PNA. He was unable to extubated and has since undergone tracheostomy but tolerating persistent trach collar and nearing ability for decannulation. His course has been also complicated by VAP, serratia bacteremia with acalculous cholecystitis s/p percutaneous tube, thyrotoxicosis with hyperthyroidism likely related to amiodarone, and persistent abdominal pain with unclear source other than possible mesenteric ischemia from possible SMA stenosis (though deemed less likely by vascular ultrasound) that has prevented adequate enteral nutrition. Short term goal is maintenance of adequate nutrition and eventual trach decannulation.     Due to persistent abdominal pain with intermittent leukocytosis and transaminitis, will discuss cholecystostomy with GI surgery team. Otherwise will re-engage vascular about possible SMA intervention.

## 2021-09-03 NOTE — PROGRESS NOTE ADULT - SUBJECTIVE AND OBJECTIVE BOX
Subjective:  No overnight events    Medications:  dextrose 40% Gel 15 Gram(s) Oral once  dextrose 5%. 1000 milliLiter(s) IV Continuous <Continuous>  dextrose 5%. 1000 milliLiter(s) IV Continuous <Continuous>  dextrose 50% Injectable 25 Gram(s) IV Push once  dextrose 50% Injectable 12.5 Gram(s) IV Push once  dextrose 50% Injectable 25 Gram(s) IV Push once  glucagon  Injectable 1 milliGRAM(s) IntraMuscular once  heparin   Injectable 5000 Unit(s) SubCutaneous every 8 hours  hydrocortisone sodium succinate Injectable 50 milliGRAM(s) IV Push every 8 hours  insulin lispro (ADMELOG) corrective regimen sliding scale   SubCutaneous every 6 hours  insulin NPH human recombinant 12 Unit(s) SubCutaneous <User Schedule>  insulin NPH human recombinant 10 Unit(s) SubCutaneous <User Schedule>  methimazole 20 milliGRAM(s) Oral <User Schedule>  metoclopramide Injectable 10 milliGRAM(s) IV Push every 8 hours  mirtazapine 7.5 milliGRAM(s) Oral daily  multiple electrolytes Injection Type 1 1000 milliLiter(s) IV Continuous <Continuous>  multivitamin 1 Tablet(s) Oral daily  ondansetron Injectable 4 milliGRAM(s) IV Push every 6 hours PRN  pantoprazole  Injectable 40 milliGRAM(s) IV Push every 12 hours  propranolol 40 milliGRAM(s) Oral every 6 hours  sertraline 100 milliGRAM(s) Oral daily  simethicone 80 milliGRAM(s) Chew every 8 hours PRN  thiamine 100 milliGRAM(s) Oral daily    Vitals:  T(C): 37 (21 @ 12:00), Max: 37 (21 @ 12:00)  HR: 96 (21 @ 13:00) (87 - 112)  BP: --  BP(mean): --  RR: 20 (21 @ 13:00) (0 - 53)  SpO2: 100% (21 @ 13:00) (86% - 100%)    Daily     Daily Weight in k (03 Sep 2021 00:00)        I&O's Summary    02 Sep 2021 07:01  -  03 Sep 2021 07:00  --------------------------------------------------------  IN: 1350 mL / OUT: 700 mL / NET: 650 mL    03 Sep 2021 07:01  -  03 Sep 2021 13:21  --------------------------------------------------------  IN: 270 mL / OUT: 250 mL / NET: 20 mL        Physical Exam:  Appearance: No Acute Distress, frail appearing  HEENT: JVP non elevated, s/p trach  Cardiovascular: VAD hums  Respiratory: Clear to auscultation bilaterally  Gastrointestinal: Soft, mildly tender throughout and worse in RUQ  Skin: no skin lesions  Neurologic: Non-focal  Extremities: No LE edema, warm and well perfused  Psychiatry: A & O x 3, flat affect     LVAD interrogation   Flow: 5.8  Speed: 9200  PI: 5.9  Power: 5.6   Alarms: none  Changes to device programming: none       Labs:                        8.3    10.05 )-----------( 224      ( 03 Sep 2021 00:30 )             27.7         135  |  100  |  23  ----------------------------<  198<H>  4.4   |  28  |  0.47<L>    Ca    9.9      03 Sep 2021 00:30  Phos  2.9       Mg     1.8         TPro  8.0  /  Alb  3.2<L>  /  TBili  0.5  /  DBili  x   /  AST  64<H>  /  ALT  149<H>  /  AlkPhos  311<H>                  Lactate Dehydrogenase, Serum: 189 U/L ( @ 00:30)  Lactate Dehydrogenase, Serum: 216 U/L ( @ 00:41)  Lactate Dehydrogenase, Serum: 238 U/L ( @ 00:41)

## 2021-09-03 NOTE — PROGRESS NOTE ADULT - ASSESSMENT
65 year old male with history of Stage D HF due to NICM on LVAD,  TV annuloplasty ring 9/12/17 as destination therapy due to severe peripheral artery disease with significant stenosis  SIADH, Depression, CKD-3 with hyperkalemia, admitted 6/14/21 with symptomatic anemia with Hgb 4.5 in setting of INR 8.8, thereafter with complicated hospital course including VAP, serratia bacteremia with acalculous cholecystitis s/p percutaneous tube, abdominal pain with unclear source other than possible mesenteric ischemia (from possible SMA stenosis? 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 Nearly two months later, on 8/7 - TSH was < 0.01  Likely 2/2 amiodarone use, exacerbated by IV contrast imaging (7/28 and 8/13). Toxic nodule is possible. Less likely Graves (negative TSH receptor Ab and TSI, but TPO Ab positive at 49.9).   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     Recs  -Avoid any tests with IV contrast  -F/u TSH, FT4, and TT3 (in lab).   -For now, continue Methimazole 20 mg BID (8 AM and 8 PM). It has been about 48 hrs since this dose was started.   -Will need to reassess TFTs on 9/4 and adjust Methimazole accordingly  -Methimazole is more likely to cause biliary dysfunction (PTU more likely to cause hepatocellular dysfunction). Alk phos, AST and ALT are elevated, but etiologies can be multifactorial. Watch for sudden changes in LFTs or biliary labs  -Continue Propranolol 40 mg q 6 hrs (need 160 mg/day to dec peripheral conversion of T4 to T3). Goal HR 60-80. Above goal, but improving.   -Continue Hydrocortisone 50 mg IV q 8 hrs (steroids used in treatment of Type 2 Amiodarone induced thyroiditis)   In some cases of refractory amio induced thyrotoxicocis, total thyroidectomy has been recommended however the pt is likely a poor surgical candidate given his multiple comorbidities     #Steroid induced hyperglycemia   HbA1c 5.4%  -can c/w NPH 12 units sq at 6am and continue 10 units at 12am, 12pm, 6pm.  -continue moderate scale sq q6 if on tube feeds  -if no tube feeds are on, low dose ISS q6hrs and hold NPH  -BS testing q6    #Amiodarone Use  Likely contributing to thyrotoxicosis as noted above  Amiodarone stopped 8/7   -Tx as above       Sia Aceves MD  Endocrine Fellow  Pager: [phone]/TIMBO [phone]  Consults 9am-5pm: [phone]  After 5pm and weekends: [phone]       65 year old male with history of Stage D HF due to NICM on LVAD,  TV annuloplasty ring 9/12/17 as destination therapy due to severe peripheral artery disease with significant stenosis  SIADH, Depression, CKD-3 with hyperkalemia, admitted 6/14/21 with symptomatic anemia with Hgb 4.5 in setting of INR 8.8, thereafter with complicated hospital course including VAP, serratia bacteremia with acalculous cholecystitis s/p percutaneous tube, abdominal pain with unclear source other than possible mesenteric ischemia (from possible SMA stenosis? 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 Nearly two months later, on 8/7 - TSH was < 0.01  Likely 2/2 amiodarone use, exacerbated by IV contrast imaging (7/28 and 8/13). Toxic nodule is possible. Less likely Graves (negative TSH receptor Ab and TSI, but TPO Ab positive at 49.9).   Patient has no prior hx of thyroid disease (slightly elevated TSH to 4.92 in 2017 when acutely ill), which is the case in Type 2 Amiodarone induced thyroiditis. However, thyrotoxicosis presented shortly after initiation of amiodarone, which is more characteristic of Type 1 Amiodarone induced thyroiditis.     TSH <0.01, FT4 >7.8  TT3 downtrending (171, from 219, before 276)    Recs  -Avoid any tests with IV contrast  -Repeat TSH, FT4, and TT3 on 9/4. T3 is downtrending   -For now, continue Methimazole 20 mg BID (8 AM and 8 PM). It has been about 48 hrs since this dose was started.   -Methimazole is more likely to cause biliary dysfunction (PTU more likely to cause hepatocellular dysfunction). Alk phos, AST and ALT are elevated, but etiologies can be multifactorial. Watch for sudden changes in LFTs or biliary labs  -Continue Propranolol 40 mg q 6 hrs (need 160 mg/day to dec peripheral conversion of T4 to T3). Goal HR 60-80. Above goal, but improving.   -Continue Hydrocortisone 50 mg IV q 8 hrs (steroids used in treatment of Type 2 Amiodarone induced thyroiditis)   In some cases of refractory amio induced thyrotoxicocis, total thyroidectomy has been recommended however the pt is likely a poor surgical candidate given his multiple comorbidities     #Steroid induced hyperglycemia   HbA1c 5.4%  -can c/w NPH 12 units sq at 6am and continue 10 units at 12am, 12pm, 6pm.  -continue moderate scale sq q6 if on tube feeds  -if no tube feeds are on, low dose ISS q6hrs and hold NPH  -BS testing q6    #Amiodarone Use  Likely contributing to thyrotoxicosis as noted above  Amiodarone stopped 8/7   -Tx as above     Discussed with Dr. Bran and primary team     Sia Aceves MD  Endocrine Fellow  Pager: [phone]/TIMBO [phone]  Consults 9am-5pm: [phone]  After 5pm and weekends: [phone]

## 2021-09-03 NOTE — PROGRESS NOTE ADULT - ASSESSMENT
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 coli UTIs, driveline drainage (1/7/21) and COVID-19 (back in April 2020)  He was recently seen in clinic where he complained of abdominal pain and dark stools w constipation back in May. He presents to [facility] ER today weakness and fatigue, moderate and + Black stools for three days, on coumadin secondary to warfarin use in the setting of an LVAD. Patient has required transfusions for GIB in the past. Mostly recently back in jan 2021 pt had anemia with dark stools. No interventions was done at that time. However Last Endoscopy was done in July 2020 (negative). Today labs show patient is anemic with H/H of 4.5/16.3,. INR is 8.84 MAP in the 90s,   Returned from endoscopy appearing ill tachypneic with chills with new white out of his left lung    Remains with complaints of abdominal pain  afebrile and appears stable    would favor continued monitoring off antimicrobials unless clinical condition changes        Morris Prater MD  [phone]  After 5pm/weekends [phone]

## 2021-09-03 NOTE — PROGRESS NOTE ADULT - SUBJECTIVE AND OBJECTIVE BOX
Admitted for:    Following for:    Subjective:       MEDICATIONS  (STANDING):  dextrose 40% Gel 15 Gram(s) Oral once  dextrose 5%. 1000 milliLiter(s) (50 mL/Hr) IV Continuous <Continuous>  dextrose 5%. 1000 milliLiter(s) (100 mL/Hr) IV Continuous <Continuous>  dextrose 50% Injectable 25 Gram(s) IV Push once  dextrose 50% Injectable 12.5 Gram(s) IV Push once  dextrose 50% Injectable 25 Gram(s) IV Push once  glucagon  Injectable 1 milliGRAM(s) IntraMuscular once  heparin   Injectable 5000 Unit(s) SubCutaneous every 8 hours  hydrocortisone sodium succinate Injectable 50 milliGRAM(s) IV Push every 8 hours  insulin lispro (ADMELOG) corrective regimen sliding scale   SubCutaneous every 6 hours  insulin NPH human recombinant 12 Unit(s) SubCutaneous <User Schedule>  insulin NPH human recombinant 10 Unit(s) SubCutaneous <User Schedule>  methimazole 20 milliGRAM(s) Oral <User Schedule>  metoclopramide Injectable 10 milliGRAM(s) IV Push every 8 hours  mirtazapine 7.5 milliGRAM(s) Oral daily  multiple electrolytes Injection Type 1 1000 milliLiter(s) (30 mL/Hr) IV Continuous <Continuous>  multivitamin 1 Tablet(s) Oral daily  pantoprazole  Injectable 40 milliGRAM(s) IV Push every 12 hours  propranolol 40 milliGRAM(s) Oral every 6 hours  sertraline 100 milliGRAM(s) Oral daily  thiamine 100 milliGRAM(s) Oral daily    MEDICATIONS  (PRN):  ondansetron Injectable 4 milliGRAM(s) IV Push every 6 hours PRN Nausea and/or Vomiting  simethicone 80 milliGRAM(s) Chew every 8 hours PRN Gas      Allergies    Amiodarone Hydrochloride (Other (Severe))    Intolerances        Review of Systems:  Constitutional: No fever  Eyes: No blurry vision  Neuro: No tremors  HEENT: No pain  Cardiovascular: No chest pain, palpitations  Respiratory: No SOB, no cough  GI: No nausea, vomiting, abdominal pain  : No dysuria  Skin: no rash  Psych: no depression  Endocrine: no polyuria, polydipsia  Hem/lymph: no swelling  Osteoporosis: no fractures    PHYSICAL EXAM:  VITALS: T(C): 37 (09-03-21 @ 12:00)  T(F): 98.6 (09-03-21 @ 12:00), Max: 98.6 (09-03-21 @ 12:00)  HR: 96 (09-03-21 @ 13:00) (87 - 112)  BP: --  RR:  (0 - 53)  SpO2:  (86% - 100%)  Wt(kg): --  GENERAL: NAD  EYES: No proptosis, no lid lag, anicteric  HEENT:  Atraumatic  THYROID: Normal size, no palpable nodules  RESPIRATORY: Clear to auscultation bilaterally  CARDIOVASCULAR: Regular rate and rhythm; No murmurs; no peripheral edema  GI: Soft, nontender, non distended, normal bowel sounds  SKIN: Dry  PSYCH: Alert and oriented x 3, flat affect      A1C with Estimated Average Glucose Result: 5.4 % (08-15-21 @ 13:52)  A1C with Estimated Average Glucose Result: 5.6 % (06-15-21 @ 02:42)      POCT Blood Glucose.: 136 mg/dL (09-03-21 @ 11:27)  POCT Blood Glucose.: 196 mg/dL (09-03-21 @ 00:02)  POCT Blood Glucose.: 168 mg/dL (09-02-21 @ 18:08)  POCT Blood Glucose.: 113 mg/dL (09-02-21 @ 13:59)  POCT Blood Glucose.: 98 mg/dL (09-02-21 @ 06:06)  POCT Blood Glucose.: 122 mg/dL (09-01-21 @ 23:58)  POCT Blood Glucose.: 108 mg/dL (09-01-21 @ 18:04)  POCT Blood Glucose.: 148 mg/dL (09-01-21 @ 12:26)  POCT Blood Glucose.: 162 mg/dL (09-01-21 @ 06:06)  POCT Blood Glucose.: 131 mg/dL (08-31-21 @ 17:38)      09-03    135  |  100  |  23  ----------------------------<  198<H>  4.4   |  28  |  0.47<L>    EGFR if : 135  EGFR if non : 117    Ca    9.9      09-03  Mg     1.8     09-03  Phos  2.9     09-03    TPro  8.0  /  Alb  3.2<L>  /  TBili  0.5  /  DBili  x   /  AST  64<H>  /  ALT  149<H>  /  AlkPhos  311<H>  09-03      Thyroid Function Tests:  09-02 @ 15:18 TSH <0.01 FreeT4 -- T3 171 Anti TPO -- Anti Thyroglobulin Ab -- TSI --  08-31 @ 00:21 TSH -- FreeT4 -- T3 -- Anti TPO 49.9 Anti Thyroglobulin Ab -- TSI --                         Admitted for: Anemia and bacteremia    Following for: Hyperthyroidism and Steroid induced hyperglycemia    Subjective: Patient feeling average. C/o abdominal pain     MEDICATIONS  (STANDING):  dextrose 40% Gel 15 Gram(s) Oral once  dextrose 5%. 1000 milliLiter(s) (50 mL/Hr) IV Continuous <Continuous>  dextrose 5%. 1000 milliLiter(s) (100 mL/Hr) IV Continuous <Continuous>  dextrose 50% Injectable 25 Gram(s) IV Push once  dextrose 50% Injectable 12.5 Gram(s) IV Push once  dextrose 50% Injectable 25 Gram(s) IV Push once  glucagon  Injectable 1 milliGRAM(s) IntraMuscular once  heparin   Injectable 5000 Unit(s) SubCutaneous every 8 hours  hydrocortisone sodium succinate Injectable 50 milliGRAM(s) IV Push every 8 hours  insulin lispro (ADMELOG) corrective regimen sliding scale   SubCutaneous every 6 hours  insulin NPH human recombinant 12 Unit(s) SubCutaneous <User Schedule>  insulin NPH human recombinant 10 Unit(s) SubCutaneous <User Schedule>  methimazole 20 milliGRAM(s) Oral <User Schedule>  metoclopramide Injectable 10 milliGRAM(s) IV Push every 8 hours  mirtazapine 7.5 milliGRAM(s) Oral daily  multiple electrolytes Injection Type 1 1000 milliLiter(s) (30 mL/Hr) IV Continuous <Continuous>  multivitamin 1 Tablet(s) Oral daily  pantoprazole  Injectable 40 milliGRAM(s) IV Push every 12 hours  propranolol 40 milliGRAM(s) Oral every 6 hours  sertraline 100 milliGRAM(s) Oral daily  thiamine 100 milliGRAM(s) Oral daily    MEDICATIONS  (PRN):  ondansetron Injectable 4 milliGRAM(s) IV Push every 6 hours PRN Nausea and/or Vomiting  simethicone 80 milliGRAM(s) Chew every 8 hours PRN Gas      Allergies    Amiodarone Hydrochloride (Other (Severe))    Intolerances        PHYSICAL EXAM:  VITALS: T(C): 37 (09-03-21 @ 12:00)  T(F): 98.6 (09-03-21 @ 12:00), Max: 98.6 (09-03-21 @ 12:00)  HR: 96 (09-03-21 @ 13:00) (87 - 112)  BP: --  RR:  (0 - 53)  SpO2:  (86% - 100%)  Wt(kg): --  GENERAL: NAD, thin, NGT in place  EYES: No proptosis, no lid lag, anicteric  HEENT:  Atraumatic  THYROID: Normal size, no palpable nodules  RESPIRATORY: Clear to auscultation bilaterally  CARDIOVASCULAR: Regular rate and rhythm; No murmurs; no peripheral edema  GI: Soft, slightly tender to palpation  SKIN: Dry  PSYCH: Alert and oriented x 3, flat affect      A1C with Estimated Average Glucose Result: 5.4 % (08-15-21 @ 13:52)  A1C with Estimated Average Glucose Result: 5.6 % (06-15-21 @ 02:42)      POCT Blood Glucose.: 136 mg/dL (09-03-21 @ 11:27)  POCT Blood Glucose.: 196 mg/dL (09-03-21 @ 00:02)  POCT Blood Glucose.: 168 mg/dL (09-02-21 @ 18:08)  POCT Blood Glucose.: 113 mg/dL (09-02-21 @ 13:59)  POCT Blood Glucose.: 98 mg/dL (09-02-21 @ 06:06)  POCT Blood Glucose.: 122 mg/dL (09-01-21 @ 23:58)  POCT Blood Glucose.: 108 mg/dL (09-01-21 @ 18:04)  POCT Blood Glucose.: 148 mg/dL (09-01-21 @ 12:26)  POCT Blood Glucose.: 162 mg/dL (09-01-21 @ 06:06)  POCT Blood Glucose.: 131 mg/dL (08-31-21 @ 17:38)      09-03    135  |  100  |  23  ----------------------------<  198<H>  4.4   |  28  |  0.47<L>    EGFR if : 135  EGFR if non : 117    Ca    9.9      09-03  Mg     1.8     09-03  Phos  2.9     09-03    TPro  8.0  /  Alb  3.2<L>  /  TBili  0.5  /  DBili  x   /  AST  64<H>  /  ALT  149<H>  /  AlkPhos  311<H>  09-03      Thyroid Function Tests:  09-02 @ 15:18 TSH <0.01 FreeT4 -- T3 171 Anti TPO -- Anti Thyroglobulin Ab -- TSI --  08-31 @ 00:21 TSH -- FreeT4 -- T3 -- Anti TPO 49.9 Anti Thyroglobulin Ab -- TSI --

## 2021-09-03 NOTE — PROGRESS NOTE ADULT - ASSESSMENT
66M with PMHx of ACC/AHA stage D HF due to NICM HM2 LVAD , CKD-3 with hyperkalemia, admitted in early June for GIB with complicated hospital course now s/p trach and perc dawn, now with intermittent intolerance with tube feeds.    - Per CTICU, pt has not been tolerating tube feeds. General Surgery called to evaluate whether this is 2/2 gallbladder and whether or not patient would benefit from a cholecystectomy.  - Inability to tolerate tube feeds and abdominal pain unlikely to be caused by gallbladder pathology. Patient is s/p perc dawn 7/30. Has been draining well, 300cc bilious fluid over the past 24 hrs. On most recent CT scan (8/27), gallbladder is decompressed and well drained.  - If primary team remains concerned and feels that perc dawn is malfunctioning, can obtain a RUQ US to reassess, however recent imaging and labs do not suggest this  - No indication for general surgery intervention at this time  - Please call back with any questions    YOVANA Conner PGY5  ATP surgery  [phone]

## 2021-09-03 NOTE — PROGRESS NOTE ADULT - PROBLEM SELECTOR PLAN 3
- Chronic in nature with questionable SMA stenosis on imaging, likely also related in part to cholecystitis now treated  - Continue to advance feeds as tolerates  - Will discuss cholecystostomy with GI surgery, he is optimized for this procedure hemodynamically

## 2021-09-03 NOTE — PROGRESS NOTE ADULT - SUBJECTIVE AND OBJECTIVE BOX
[facility] NUTRITION SUPPORT--  Attending/ PA FOLLOW UP NOTE      24 hour events/subjective: TPN originally consulted for nutrition support on 8/15.  Pt however is tolerating TFs now at 30cc/hr, and is not requiring TPN for any additional nutritional support at this time.        PAST HISTORY  --------------------------------------------------------------------------------  No significant changes to PMH, PSH, FHx, SHx, unless otherwise noted    ALLERGIES & MEDICATIONS  --------------------------------------------------------------------------------  Allergies    Amiodarone Hydrochloride (Other (Severe))    Intolerances      Standing Inpatient Medications  dextrose 40% Gel 15 Gram(s) Oral once  dextrose 5%. 1000 milliLiter(s) IV Continuous <Continuous>  dextrose 5%. 1000 milliLiter(s) IV Continuous <Continuous>  dextrose 50% Injectable 25 Gram(s) IV Push once  dextrose 50% Injectable 12.5 Gram(s) IV Push once  dextrose 50% Injectable 25 Gram(s) IV Push once  glucagon  Injectable 1 milliGRAM(s) IntraMuscular once  heparin   Injectable 5000 Unit(s) SubCutaneous every 8 hours  hydrocortisone sodium succinate Injectable 50 milliGRAM(s) IV Push every 8 hours  insulin lispro (ADMELOG) corrective regimen sliding scale   SubCutaneous every 6 hours  insulin NPH human recombinant 12 Unit(s) SubCutaneous <User Schedule>  insulin NPH human recombinant 10 Unit(s) SubCutaneous <User Schedule>  methimazole 20 milliGRAM(s) Oral <User Schedule>  metoclopramide Injectable 10 milliGRAM(s) IV Push every 8 hours  mirtazapine 7.5 milliGRAM(s) Oral daily  multiple electrolytes Injection Type 1 1000 milliLiter(s) IV Continuous <Continuous>  multivitamin 1 Tablet(s) Oral daily  pantoprazole  Injectable 40 milliGRAM(s) IV Push every 12 hours  propranolol 40 milliGRAM(s) Oral every 6 hours  sertraline 100 milliGRAM(s) Oral daily  thiamine 100 milliGRAM(s) Oral daily    PRN Inpatient Medications  ondansetron Injectable 4 milliGRAM(s) IV Push every 6 hours PRN  simethicone 80 milliGRAM(s) Chew every 8 hours PRN      REVIEW OF SYSTEMS  --------------------------------------------------------------------------------  Gen: as per HPI  Skin: No rashes  Head/Eyes/Ears/Mouth: No headache;No sore throat  Respiratory: No dyspnea, cough,   CV: No chest pain, PND, orthopnea  GI: as per HPI  : No increased frequency, dysuria, hematuria, nocturia  MSK: No joint pain/swelling; no back pain; no edema  Neuro: No dizziness/lightheadedness, weakness, seizures, numbness, tingling  Psych: No significant nervousness, anxiety, stress, depression    All other systems were reviewed and are negative, except as noted.      LABS/STUDIES  --------------------------------------------------------------------------------              8.3    10.05 >-----------<  224      [09-03-21 @ 00:30]              27.7     135  |  100  |  23  ----------------------------<  198      [09-03-21 @ 00:30]  4.4   |  28  |  0.47        Ca     9.9     [09-03-21 @ 00:30]      Mg     1.8     [09-03-21 @ 00:30]      Phos  2.9     [09-03-21 @ 00:30]    TPro  8.0  /  Alb  3.2  /  TBili  0.5  /  DBili  x   /  AST  64  /  ALT  149  /  AlkPhos  311  [09-03-21 @ 00:30]              [09-03-21 @ 00:30]    Blood Gas Arterial - Calcium, Ionized: 1.35 mmol/L (09-03-21 @ 03:37)  Blood Gas Arterial - Calcium, Ionized: 1.33 mmol/L (09-03-21 @ 00:14)    Glucose, Serum: 198 mg/dL (09-03-21 @ 00:30)    Prealbumin, Serum: 11 mg/dL (08-16-21 @ 04:01)  Prealbumin, Serum: 10 mg/dL (08-15-21 @ 13:53)          09-02-21 @ 07:01  -  09-03-21 @ 07:00  --------------------------------------------------------  IN: 1350 mL / OUT: 700 mL / NET: 650 mL    09-03-21 @ 07:01  -  09-03-21 @ 14:03  --------------------------------------------------------  IN: 270 mL / OUT: 250 mL / NET: 20 mL        VITALS/PHYSICAL EXAM  --------------------------------------------------------------------------------  T(C): 37 (09-03-21 @ 12:00), Max: 37 (09-03-21 @ 12:00)  HR: 97 (09-03-21 @ 14:00) (87 - 112)  BP: --  RR: 18 (09-03-21 @ 14:00) (0 - 53)  SpO2: 100% (09-03-21 @ 14:00) (95% - 100%)  Wt(kg): --    Physical Exam:    	Gen: NAD, well-appearing  	HEENT: PERRL,           Neck: Trachea midline, no noted JVD          Chest: non labored breathing, equal chest expansion b/l  	Abd: nondistended  	UE: Warm, FROM, intact strength; no edema; no asterixis  	LE: Warm, FROM, intact strength; no edema  	Neuro: AOx3  	Psych: Normal affect and mood  	Skin: Warm, without rashes  .  .  .  [facility] NUTRITION SUPPORT--  Attending/ PA FOLLOW UP NOTE      24 hour events/subjective: TPN originally consulted for nutrition support and started TPN on ____.  Pt is tolerating wihtou issues, and denies palpitations, chest pain, shortness of breath. Pt further denies fevers, chills nor night sweats. Denies nausea nor vomiting nor abdominal pain.        PAST HISTORY  --------------------------------------------------------------------------------  No significant changes to PMH, PSH, FHx, SHx, unless otherwise noted    ALLERGIES & MEDICATIONS  --------------------------------------------------------------------------------  Allergies    Amiodarone Hydrochloride (Other (Severe))    Intolerances      Standing Inpatient Medications  dextrose 40% Gel 15 Gram(s) Oral once  dextrose 5%. 1000 milliLiter(s) IV Continuous <Continuous>  dextrose 5%. 1000 milliLiter(s) IV Continuous <Continuous>  dextrose 50% Injectable 25 Gram(s) IV Push once  dextrose 50% Injectable 12.5 Gram(s) IV Push once  dextrose 50% Injectable 25 Gram(s) IV Push once  glucagon  Injectable 1 milliGRAM(s) IntraMuscular once  heparin   Injectable 5000 Unit(s) SubCutaneous every 8 hours  hydrocortisone sodium succinate Injectable 50 milliGRAM(s) IV Push every 8 hours  insulin lispro (ADMELOG) corrective regimen sliding scale   SubCutaneous every 6 hours  insulin NPH human recombinant 12 Unit(s) SubCutaneous <User Schedule>  insulin NPH human recombinant 10 Unit(s) SubCutaneous <User Schedule>  methimazole 20 milliGRAM(s) Oral <User Schedule>  metoclopramide Injectable 10 milliGRAM(s) IV Push every 8 hours  mirtazapine 7.5 milliGRAM(s) Oral daily  multiple electrolytes Injection Type 1 1000 milliLiter(s) IV Continuous <Continuous>  multivitamin 1 Tablet(s) Oral daily  pantoprazole  Injectable 40 milliGRAM(s) IV Push every 12 hours  propranolol 40 milliGRAM(s) Oral every 6 hours  sertraline 100 milliGRAM(s) Oral daily  thiamine 100 milliGRAM(s) Oral daily    PRN Inpatient Medications  ondansetron Injectable 4 milliGRAM(s) IV Push every 6 hours PRN  simethicone 80 milliGRAM(s) Chew every 8 hours PRN      REVIEW OF SYSTEMS  --------------------------------------------------------------------------------  Gen: as per HPI  Skin: No rashes  Head/Eyes/Ears/Mouth: No headache;No sore throat  Respiratory: No dyspnea, cough,   CV: No chest pain, PND, orthopnea  GI: as per HPI  : No increased frequency, dysuria, hematuria, nocturia  MSK: No joint pain/swelling; no back pain; no edema  Neuro: No dizziness/lightheadedness, weakness, seizures, numbness, tingling  Psych: No significant nervousness, anxiety, stress, depression    All other systems were reviewed and are negative, except as noted.      LABS/STUDIES  --------------------------------------------------------------------------------              8.3    10.05 >-----------<  224      [09-03-21 @ 00:30]              27.7     135  |  100  |  23  ----------------------------<  198      [09-03-21 @ 00:30]  4.4   |  28  |  0.47        Ca     9.9     [09-03-21 @ 00:30]      Mg     1.8     [09-03-21 @ 00:30]      Phos  2.9     [09-03-21 @ 00:30]    TPro  8.0  /  Alb  3.2  /  TBili  0.5  /  DBili  x   /  AST  64  /  ALT  149  /  AlkPhos  311  [09-03-21 @ 00:30]              [09-03-21 @ 00:30]    Blood Gas Arterial - Calcium, Ionized: 1.35 mmol/L (09-03-21 @ 03:37)  Blood Gas Arterial - Calcium, Ionized: 1.33 mmol/L (09-03-21 @ 00:14)    Glucose, Serum: 198 mg/dL (09-03-21 @ 00:30)    Prealbumin, Serum: 11 mg/dL (08-16-21 @ 04:01)  Prealbumin, Serum: 10 mg/dL (08-15-21 @ 13:53)          09-02-21 @ 07:01  -  09-03-21 @ 07:00  --------------------------------------------------------  IN: 1350 mL / OUT: 700 mL / NET: 650 mL    09-03-21 @ 07:01  -  09-03-21 @ 14:07  --------------------------------------------------------  IN: 270 mL / OUT: 250 mL / NET: 20 mL        VITALS/PHYSICAL EXAM  --------------------------------------------------------------------------------  T(C): 37 (09-03-21 @ 12:00), Max: 37 (09-03-21 @ 12:00)  HR: 97 (09-03-21 @ 14:00) (87 - 112)  BP: --  RR: 18 (09-03-21 @ 14:00) (0 - 53)  SpO2: 100% (09-03-21 @ 14:00) (95% - 100%)  Wt(kg): --    Physical Exam:    Gen: laying in bed  HEENT: NCAT PERRL  neck+trach +ngt   Chest: respirations non labored  Abd: soft  nd +perc c-tube  LE: no edema  Neuro: awake alert appropriate    .  .  .

## 2021-09-04 NOTE — PROGRESS NOTE ADULT - ASSESSMENT
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 coli UTIs, driveline drainage (1/7/21) and COVID-19, here initially for GIB prolonged hospital course, s/p tracheostomy, acalculous cholecystitis s/p perc dawn. Patient has persistent intermittent abdominal pain. Per CTU team, pt has recently been refusing tube feeds and is being evaluated for chronic mesenteric ischemia vs SMA syndrome.  8/13 bld cxs positive. Consulted for TPN. Prealb 11, prior a1c 5.6, tg 141. 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 Tolerating feeds but with intermittent abd pain. SLP eval recommending NPO status.    -Continue tube feeds- Vital 1.5 and advance to goal as tolerated (goal of 65 ml/hr) as per CTU team, no present plan to initiate TPN, will reassess need pending clinical course  -suspect uncontrolled hyperthyroidism contributing to metabolic issues ie. gi motility, hypercalcemia, tachycardia; further management as per Endocrine  -h/o abdominal pain/vomiting- as above, Zofran prn for nausea; Reglan for gut motility  -Hypothermia/leukocytosis- restarted abx, f/u cxs, care per ID  -Anemia- rec iron supp when feasible  -Psych, palliative care, and ethics input noted  -Management as per CTU; will remain available as needed    plan discussed with Martínez, agreeable with above    TPN pager [phone], spectra [phone]  M-F 6A-4P, Weekends and holidays 8A-12P

## 2021-09-04 NOTE — PROGRESS NOTE ADULT - ASSESSMENT
65 year old male with history of Stage D HF due to NICM on LVAD, TV annuloplasty ring 9/12/17 as destination therapy due to severe peripheral artery disease with significant stenosis  SIADH, Depression, CKD-3 with hyperkalemia, admitted 6/14/21 with symptomatic anemia with Hgb 4.5 in setting of INR 8.8, thereafter with complicated hospital course including VAP, serratia bacteremia with acalculous cholecystitis s/p percutaneous tube, abdominal pain with unclear source other than possible mesenteric ischemia (from possible SMA stenosis? 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 Nearly two months later, on 8/7 - TSH was < 0.01  Likely 2/2 amiodarone use, exacerbated by IV contrast imaging (7/28 and 8/13). Toxic nodule is possible. Less likely Graves (negative TSH receptor Ab and TSI, but TPO Ab positive at 49.9).   Patient has no prior hx of thyroid disease (slightly elevated TSH to 4.92 in 2017 when acutely ill), which is the case in Type 2 Amiodarone induced thyroiditis. However, thyrotoxicosis presented shortly after initiation of amiodarone, which is more characteristic of Type 1 Amiodarone induced thyroiditis.     TSH <0.01, FT4 >7.8  TT3 downtrending  T4 no change    Recs  -Avoid any tests with IV contrast  -Repeat TSH, FT4, and TT3 on 09/07/21 AM  -For now, continue Methimazole 20 mg BID (8 AM and 8 PM). Patient had been vomiting intermittently while NG is post-pyloric as per RN. Monitor x 24 hours and if any vomiting, would recommend administer Methimazole via rectal route for better absorption.  - Recommend consider Cholestyramine 4g TID if no objection by GI surgery as this can help reduce free thyroxine levels via intestinal binding and excretion in stool.   - Alk phos, AST and ALT are elevated and stable range, but etiologies can be multifactorial. Watch for sudden changes in LFTs or biliary labs  -Continue Propranolol 40 mg q 6 hrs (need 160 mg/day to dec peripheral conversion of T4 to T3). Goal HR 60-80. Above goal, but improving.   -Continue Hydrocortisone 50 mg IV q 8 hrs (steroids used in treatment of Type 2 Amiodarone induced thyroiditis)   In some cases of refractory amio induced thyrotoxicocis, total thyroidectomy has been recommended however the pt is likely a poor surgical candidate given his multiple comorbidities     #Steroid induced hyperglycemia   HbA1c 5.4%  -can c/w NPH 12 units sq at 6am and continue 10 units at 12am, 12pm, 6pm.  -continue moderate scale sq q6 if on tube feeds  -if no tube feeds are on, low dose ISS q6hrs and hold NPH  -BS testing q6    #Amiodarone Use  Likely contributing to thyrotoxicosis as noted above  Amiodarone stopped 8/7   -Tx as above       D/w CTICU NP    *Please note a different provider maybe managing this patient tomorrow/daily*. Please contact our office at [phone] regarding follow-up queries about this patient. For any endocrine emergencies, please state urgency when calling [phone] during business hours and to speak with on-call fellow after 5pm and on weekends.    Naila Harris DO  Pager: 9AM - 5PM (Mon-Fri): [phone]  After 5PM and on Weekends: [phone]

## 2021-09-04 NOTE — PROGRESS NOTE ADULT - PROBLEM SELECTOR PLAN 4
- serratia bacteremia and poss acalculous cholecystitis. B/c with gram + cocci and many enterobacter Carbapenem resistant strain and now s/p per dawn drain  - also with enterobacter from sputum culture 8/13  - off antibitoics

## 2021-09-04 NOTE — PROGRESS NOTE ADULT - ASSESSMENT
67 YO M with a history of stage D NICM s/p HM2 on 9/2017 as DT (due to severe PAD) with TV ring, prior COVID-19 infection 4/2020, recurrent syncope post LVAD s/p ILR, and chronic abdominal pain with prior negative workup who was admitted 6/14/21 with symptomatic anemia with Hgb 4.5 in setting of INR 8.8 without hemodynamic instability and has since had a protracted hospitalization. He was transfused and underwent VCE which showed active bleeding in the mid small bowel but subsequent enteroscopy 6/15 did not reveal any active bleeding. He acutely decompensated after procedure with fever/hypertension, low flow alarms, and pulmonary infiltrate with hypoxia requiring intubation from probable aspiration PNA. He was unable to extubated and has since undergone tracheostomy but tolerating persistent trach collar and nearing ability for decannulation. 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 Short term goal is maintenance of adequate nutrition and eventual trach decannulation.     Discussed with vascular surgery team and plan is to proceed with angiogram next week to assess for SMA stenosis.

## 2021-09-04 NOTE — PROGRESS NOTE ADULT - ASSESSMENT
Assessment and Recommendation:   · Assessment	  Assessment and recommendation :  Recurrent Acute hypoxic respiratory Failure S/P tracheostomy on trach. collar  50% FI02,   Acute blood loss anemia S/P multiple  blood transfusion   S/P septic shock off vasopressin , levophed and off  Dobutamine   S/P cholecystostomy tube placement by IR    AF RVR back to regular sinus Rhythm   Non ischemic cardiomyopathy continue ACE inhibitor and B-Blockers   S/P Septic shock and cardiogenic shock   Stage D systolic heart failure S/P LVAD HM2   MH2 LVAD  with  TV Annuloplasty  Severe peripheral vascular disease   severe hyperglycemia on insulin coverage    hyperthyroidism on Methimazole 10mg TID   critical care polyneuropathy   Anemia of Acute blood Loss   severe protein caloric malnutrition   S/P blood and FFP transfusion   Chronic kidney disease stage III  failed speech  and swallow evaluation   resume  NGT feeding .tolerating it well   Decannulation ?  GI prophylaxis with PPI

## 2021-09-04 NOTE — PROGRESS NOTE ADULT - SUBJECTIVE AND OBJECTIVE BOX
RICKY JOINT  MRN#: [id number]  Subjective:  Pulmonary progress  : recurrent Acute hypoxic respiratory Failure ,aspiration pneumonia, NICM  , chart reviewed and H/O obtained radiological and Laboratory study reviewed patient Examined     65M PMH ACC/AHA stage D HF due to NICM HM2 LVAD , TV annuloplasty ring 17 as destination therapy due to severe peripheral artery disease with significant stenosis  SIADH, Depression, CKD-3 with hyperkalemia, past E. coli UTIs, driveline drainage (21) and COVID-19 (back in 2020)  He was recently seen in clinic where he complained of abdominal pain and dark stools w constipation back in May. He presents to [facility] ER today weakness and fatigue, moderate and + Black stools for three days, on coumadin secondary to warfarin use in the setting of an LVAD. Patient has required transfusions for GIB in the past. Mostly recently back in 2021 pt had anemia with dark stools. No interventions was done at that time. However Last Endoscopy was done in 2020 (negative). Today labs show patient is anemic with H/H of 4.5/16.3,. INR is 8.84 MAP in the 90s, Temp 35.1. He denies any chest pain, shortness of breath, dizziness, abd pain, nausea or vomiting. found to have  rectal bleeding underwent endoscopy ,old blood in the proximal ileum ,  develop sepsis with LL opacity given Antibiotics , Extubated , reintubated , Bronchoscopy on Zosyn for LL pneumonia  and Amiodarone S/P TV Annuloplasty , patient remain intubated on full ventilatory support .S/P multiple units of blood transfusion , remain on full ventilatory support on Precedex and propofol , new central IJ line , diarrhea C diff. +ve on po vancomycin and IV Flagyl,  mildly distended belly , fever start on cefepime 2gm q 8 hrs S/P tracheostomy .  new RT Subclavian central line continue on contact  isolation ,still diarrhea on C-diff antibiotics ENT follow up appreciated , trial of C-PAP as tolerated , , copious secretion from trach. site chest x ray left lower lobe pneumonia , tolerating trch. collar 50% FI02 still excessive secretion need pulmonary toilet , off Ancef antibiotic , no more diarrhea back on full support mechanical ventilator , chest x ray show improvement in LLL air space disease, more awake and responsive on tube feeding no more diarrhea , S/P  Ancef for bacteremia no fever ,ID follow up noted ,  no nausea or vomiting or diarrhea still very weak and tired , event note pulled NG tube now replaced , back on tube feeding ,still on po vancomycin , getting PT and OT at bed side , no plan for decannulation for now. , no more diarrhea receiving PT and OPT at bed side , minimal secretion from tracheostomy site , no SOB getting stronger , improve muscle tone patient transfer to monitor bed still on contact isolation for C-Difficel colitis on 50% FI02, NG tube clogged , and change to resume tube feeding still loose stool . H/H drop significantly require blood transfusion , most likely GI bleeding , IV heparin D/C , vomiting 200 cc of creamy color tube feeding on hold no sob, still melena monitor in the CTU possible capsule endoscopy , H/H is stable ., patient develop TR sided  pneumothorax require chest tube placement , RT IJ central line  placed , develop fever shaking chills , blood culture positive for serratia marcescens , start on cefepime .the patient  become hypoxic and hypotensive placed on full ventilatory support and Vasopressin , levophed and Dobutamine ,S/P blood transfusion on meropenem and vancomycin ,   , on and  off pressors , occasional agitation on Precedex .S/P IR cholecystostomy tube drainage placement in the RT upper Quadrant , resume anticoagulation chest x ray noted C-PAP trail lasted only for 2 hrs , new RT SC line and D/C RT IJ line , RT pig tail cathter has been removed , tolerating C-PAP trial placed on trach. collar 50% FI02 GI consultant noted on NG tube feeding as tolerated , develop AF RVR S/P  bolus Amiodarone  back to regular sinus Rhythm , Flat Affect depressed , back on tube feeding Vital AF at 60 cc/ hr .still intermittent abdominal pain , no fever saturation is accepted  back on full ventilatory support , tired and sleepy on round  .start  back on  tube feeding . tolerating it very well , no nausea or vomiting , good 02 saturation on trac-collar on methimazole for hyperthyroidism , no new C/O no plan for decannulation yet , electrolyte blood test is still pending .on respiratory isolation sputum culture is negative , increase WBC  improved on cefepime , sputum positive for enterococcus , condition is the same ,still C/O Abdominal pain , white count is improving , no chest pain or SOB , speech and swallow  evaluation appreciated , recommend continue NG tube feeding , Antibiotic is discontinue .       (2021 16:57)    PAST MEDICAL & SURGICAL HISTORY:  CHF (congestive heart failure)    CAD (coronary artery disease)  Depression    Pleural effusion    History of 2019 novel coronavirus disease (COVID-19)  2020    Hemorrhoids    Bleeding hemorrhoids    Peripheral arterial disease    Claudication    BPH with urinary obstruction    ACC/AHA stage D systolic heart failure  Anticoagulation goal of INR 2.0 to 2.5    Falls    Clavicle fracture    CKD (chronic kidney disease), stage III    Iron deficiency anemia    H/O epistaxis    Vertigo    GI bleed    S/P TVR (tricuspid valve repair)    S/P ventricular assist device    S/P endoscopy    OBJECTIVE:  ICU Vital Signs Last 24 Hrs  T(C): 38.2 (2021 10:00), Max: 38.5 (2021 12:00)  T(F): 100.8 (2021 10:00), Max: 101.3 (2021 12:00)  HR: 65 (2021 10:00) (61 - 69)  BP: --  BP(mean): --  ABP: 105/67 (2021 10:00) (90/54 - 113/64)  ABP(mean): 77 (2021 10:00) (63 - 77)  RR: 20 (2021 10:00) (19 - 35)  SpO2: 99% (2021 10:00) (96% - 100%)       @ : @ 07:00  --------------------------------------------------------  IN: 2693.9 mL / OUT: 1415 mL / NET: 1278.9 mL     @ 07:  -   @ 10:49  --------------------------------------------------------  IN: 420.8 mL / OUT: 115 mL / NET: 305.8 mL  PHYSICAL EXAM:Daily   Elderly male S/P tracheostomy on trach. collar 50% FI02 ,  Daily Weight in k.4 (2021 00:00)  HEENT:     + NCAT  + EOMI  - Conjuctival edema   - Icterus   - Thrush   - ETT  + NGT/OGT  Neck:         + FROM  RT IJ  line JVD  - Nodes - Masses + Mid-line trachea + Tracheostomy  Chest:            normal A-P diameter    Lungs:          + CTA   + Rhonchi    - Rales    - Wheezing + Decreased  LT BS   - Dullness R L  Cardiac:       + S1 + S2    + RRR   - Irregular   - S3  - S4    - Murmurs   - Rub   - Hamman’s sign   Abdomen:    + BS  + Soft + Non-tender - Distended - Organomegaly - PEG .cholecystostomy tube in place  Extremities:   - Cyanosis U/L   - Clubbing  U/L  + LE/UE Edema   + Capillary refill    + Pulses   Neuro:        - Awake   -  Alert   - Confused   - Lethargic   + Sedated  + Generalized Weakness  Skin:        - Rashes    - Erythema   + Normal incisions   + IV sites intact          HOSPITAL MEDICATIONS: All medications reviewed and analyzed  MEDICATIONS  (STANDING):  amiodarone    Tablet 200 milliGRAM(s) Oral daily  chlorhexidine 0.12% Liquid 15 milliLiter(s) Oral Mucosa every 12 hours  chlorhexidine 2% Cloths 1 Application(s) Topical <User Schedule>  dexmedetomidine Infusion 0.5 MICROgram(s)/kG/Hr (9.81 mL/Hr) IV Continuous <Continuous>  dextrose 50% Injectable 50 milliLiter(s) IV Push every 15 minutes  heparin  Infusion 400 Unit(s)/Hr (12.5 mL/Hr) IV Continuous <Continuous>  Hydromorphone  Injectable 0.5 milliGRAM(s) IV Push once  insulin lispro (ADMELOG) corrective regimen sliding scale   SubCutaneous every 6 hours  pantoprazole  Injectable 40 milliGRAM(s) IV Push every 12 hours  piperacillin/tazobactam IVPB.. 3.375 Gram(s) IV Intermittent every 8 hours  propofol Infusion 20 MICROgram(s)/kG/Min (9.42 mL/Hr) IV Continuous <Continuous>  sodium chloride 0.9% lock flush 3 milliLiter(s) IV Push every 8 hours  sodium chloride 0.9%. 1000 milliLiter(s) (10 mL/Hr) IV Continuous <Continuous>    MEDICATIONS  (PRN):  acetaminophen    Suspension .. 650 milliGRAM(s) Oral every 6 hours PRN Temp greater or equal to 38C (100.4F)    LABS: All Lab data reviewed and analyzed                         8.1    10.70 )-----------( 227      ( 04 Sep 2021 00:40 )  09-    137  |  100  |  20  ----------------------------<  143<H>  4.0   |  29  |  0.48<L>    Ca    10.1      04 Sep 2021 00:40  Phos  3.0     09-04  Mg     1.8         TPro  8.0  /  Alb  3.1<L>  /  TBili  0.5  /  DBili  x   /  AST  79<H>  /  ALT  178<H>  /  AlkPhos  318<H>                 26.3   Ca    9.9      03 Sep 2021 00:30  Phos  2.9     09-03  Mg     1.8     -    TPro  8.0  /  Alb  3.2<L>  /  TBili  0.5  /  DBili  x   /  AST  64<H>  /  ALT  149<H>  /  AlkPhos  311<H>                                                                                                                      PTT - ( 2021 04:52 )  PTT:45.2 sec LIVER FUNCTIONS - ( 2021 00:42 )  Alb: 3.4 g/dL / Pro: 6.7 g/dL / ALK PHOS: 213 U/L / ALT: 15 U/L / AST: 24 U/L / GGT: x           RADIOLOGY: - Reviewed and analyzed RT Pig tail cathter  , LVAD HM2, CT scan of abdomen reviewed result noted

## 2021-09-04 NOTE — PROGRESS NOTE ADULT - PROBLEM SELECTOR PLAN 3
- Chronic in nature with questionable SMA stenosis on imaging and mesenteric duplex difficult to interpret with continuous flow. Discussed with vascular surgery and will plan for mesenteric angiogram next week  - Continue to advance feeds as tolerates  - Discussed cholecystostomy with GI surgery, unlikely to derive symptomatic benefit since perc dawn tube continues to drain. Repeat RUQ u/s pending to ensure gall bladder is decompressed

## 2021-09-04 NOTE — PROGRESS NOTE ADULT - SUBJECTIVE AND OBJECTIVE BOX
[facility] NUTRITION SUPPORT--  Attending/ PA FOLLOW UP NOTE      24 hour events/subjective:     TPN originally consulted for nutrition support on 8/15.  Pt however is tolerating TFs now at 35cc/hr, and is not requiring TPN for any additional nutritional support at this time.        PAST HISTORY      PAST HISTORY  --------------------------------------------------------------------------------  No significant changes to PMH, PSH, FHx, SHx, unless otherwise noted    ALLERGIES & MEDICATIONS  --------------------------------------------------------------------------------  Allergies    Amiodarone Hydrochloride (Other (Severe))    Intolerances      Standing Inpatient Medications  acetylcysteine 20%  Inhalation 3 milliLiter(s) Inhalation every 6 hours  albuterol/ipratropium for Nebulization 3 milliLiter(s) Nebulizer every 6 hours  dextrose 40% Gel 15 Gram(s) Oral once  dextrose 5%. 1000 milliLiter(s) IV Continuous <Continuous>  dextrose 5%. 1000 milliLiter(s) IV Continuous <Continuous>  dextrose 50% Injectable 25 Gram(s) IV Push once  dextrose 50% Injectable 12.5 Gram(s) IV Push once  dextrose 50% Injectable 25 Gram(s) IV Push once  glucagon  Injectable 1 milliGRAM(s) IntraMuscular once  heparin   Injectable 5000 Unit(s) SubCutaneous every 8 hours  hydrocortisone sodium succinate Injectable 50 milliGRAM(s) IV Push every 8 hours  insulin lispro (ADMELOG) corrective regimen sliding scale   SubCutaneous every 6 hours  insulin NPH human recombinant 12 Unit(s) SubCutaneous <User Schedule>  insulin NPH human recombinant 10 Unit(s) SubCutaneous <User Schedule>  methimazole 20 milliGRAM(s) Oral <User Schedule>  metoclopramide Injectable 10 milliGRAM(s) IV Push every 8 hours  mirtazapine 7.5 milliGRAM(s) Oral daily  multiple electrolytes Injection Type 1 1000 milliLiter(s) IV Continuous <Continuous>  multivitamin 1 Tablet(s) Oral daily  pantoprazole  Injectable 40 milliGRAM(s) IV Push every 12 hours  propranolol 40 milliGRAM(s) Oral every 6 hours  sertraline 100 milliGRAM(s) Oral daily  sodium chloride 0.9% for Nebulization 3 milliLiter(s) Nebulizer every 6 hours  thiamine 100 milliGRAM(s) Oral daily    PRN Inpatient Medications  ondansetron Injectable 4 milliGRAM(s) IV Push every 6 hours PRN  simethicone 80 milliGRAM(s) Chew every 8 hours PRN      REVIEW OF SYSTEMS  --------------------------------------------------------------------------------  Gen: as per HPI  Skin: No rashes  Head/Eyes/Ears/Mouth: No headache;No sore throat  Respiratory: No dyspnea, cough,   CV: No chest pain, PND, orthopnea  GI: as per HPI  : No increased frequency, dysuria, hematuria, nocturia  MSK: No joint pain/swelling; no back pain; no edema  Neuro: No dizziness/lightheadedness, weakness, seizures, numbness, tingling  Psych: No significant nervousness, anxiety, stress, depression    All other systems were reviewed and are negative, except as noted.      LABS/STUDIES  --------------------------------------------------------------------------------              8.1    10.70 >-----------<  227      [09-04-21 @ 00:40]              26.3     137  |  100  |  20  ----------------------------<  143      [09-04-21 @ 00:40]  4.0   |  29  |  0.48        Ca     10.1     [09-04-21 @ 00:40]      Mg     1.8     [09-04-21 @ 00:40]      Phos  3.0     [09-04-21 @ 00:40]    TPro  8.0  /  Alb  3.1  /  TBili  0.5  /  DBili  x   /  AST  79  /  ALT  178  /  AlkPhos  318  [09-04-21 @ 00:40]              [09-03-21 @ 00:30]    Blood Gas Arterial - Calcium, Ionized: 1.34 mmol/L (09-04-21 @ 00:36)  Blood Gas Arterial - Calcium, Ionized: 1.35 mmol/L (09-03-21 @ 03:37)    Glucose, Serum: 143 mg/dL (09-04-21 @ 00:40)    Prealbumin, Serum: 11 mg/dL (08-16-21 @ 04:01)  Prealbumin, Serum: 10 mg/dL (08-15-21 @ 13:53)          09-03-21 @ 07:01  -  09-04-21 @ 07:00  --------------------------------------------------------  IN: 1650 mL / OUT: 1470 mL / NET: 180 mL    09-04-21 @ 07:01  -  09-04-21 @ 14:05  --------------------------------------------------------  IN: 550 mL / OUT: 300 mL / NET: 250 mL        VITALS/PHYSICAL EXAM  --------------------------------------------------------------------------------  T(C): 35.8 (09-04-21 @ 12:00), Max: 36.7 (09-03-21 @ 16:00)  HR: 93 (09-04-21 @ 14:00) (82 - 107)  BP: --  RR: 19 (09-04-21 @ 14:00) (13 - 53)  SpO2: 100% (09-04-21 @ 14:00) (100% - 100%)  Wt(kg): --    Physical Exam:    Gen: laying in bed  HEENT: NCAT PERRL  neck: no noted JVD, trach +NGT  Chest: respirations non labored  Abd: soft  ND +Perc c-tube  LE: no edema  Neuro: awake alert appropriate    .  .  .      .  .  .

## 2021-09-04 NOTE — PROGRESS NOTE ADULT - SUBJECTIVE AND OBJECTIVE BOX
Subjective:  No overnight events, no further emesis. Still with abdominal pain.     Medications:  acetylcysteine 20%  Inhalation 3 milliLiter(s) Inhalation every 6 hours  albuterol/ipratropium for Nebulization 3 milliLiter(s) Nebulizer every 6 hours  dextrose 40% Gel 15 Gram(s) Oral once  dextrose 5%. 1000 milliLiter(s) IV Continuous <Continuous>  dextrose 5%. 1000 milliLiter(s) IV Continuous <Continuous>  dextrose 50% Injectable 25 Gram(s) IV Push once  dextrose 50% Injectable 12.5 Gram(s) IV Push once  dextrose 50% Injectable 25 Gram(s) IV Push once  glucagon  Injectable 1 milliGRAM(s) IntraMuscular once  heparin   Injectable 5000 Unit(s) SubCutaneous every 8 hours  hydrocortisone sodium succinate Injectable 50 milliGRAM(s) IV Push every 8 hours  insulin lispro (ADMELOG) corrective regimen sliding scale   SubCutaneous every 6 hours  insulin NPH human recombinant 12 Unit(s) SubCutaneous <User Schedule>  insulin NPH human recombinant 10 Unit(s) SubCutaneous <User Schedule>  methimazole 20 milliGRAM(s) Oral <User Schedule>  metoclopramide Injectable 10 milliGRAM(s) IV Push every 8 hours  mirtazapine 7.5 milliGRAM(s) Oral daily  multiple electrolytes Injection Type 1 1000 milliLiter(s) IV Continuous <Continuous>  multivitamin 1 Tablet(s) Oral daily  ondansetron Injectable 4 milliGRAM(s) IV Push every 6 hours PRN  pantoprazole  Injectable 40 milliGRAM(s) IV Push every 12 hours  propranolol 40 milliGRAM(s) Oral every 6 hours  sertraline 100 milliGRAM(s) Oral daily  simethicone 80 milliGRAM(s) Chew every 8 hours PRN  sodium chloride 0.9% for Nebulization 3 milliLiter(s) Nebulizer every 6 hours  thiamine 100 milliGRAM(s) Oral daily    Vitals:  T(C): 35.8 (09-04-21 @ 12:00), Max: 36.7 (09-03-21 @ 16:00)  HR: 93 (09-04-21 @ 12:00) (82 - 107)  BP: --  BP(mean): --  RR: 20 (09-04-21 @ 12:00) (13 - 53)  SpO2: 100% (09-04-21 @ 12:00) (100% - 100%)    Daily     Daily         I&O's Summary    03 Sep 2021 07:01  -  04 Sep 2021 07:00  --------------------------------------------------------  IN: 1650 mL / OUT: 1470 mL / NET: 180 mL    04 Sep 2021 07:01  -  04 Sep 2021 13:06  --------------------------------------------------------  IN: 485 mL / OUT: 300 mL / NET: 185 mL        Physical Exam:  Appearance: No Acute Distress, frail appearing  HEENT: JVP non elevated, s/p trach  Cardiovascular: VAD hums  Respiratory: Clear to auscultation bilaterally  Gastrointestinal: Soft, mildly tender throughout and worse in RUQ  Skin: no skin lesions  Neurologic: Non-focal  Extremities: No LE edema, warm and well perfused  Psychiatry: A & O x 3, flat affect     LVAD interrogation   Flow: 5.6  Speed: 9200  PI: 5.8  Power: 5.7   Alarms: none  Changes to device programming: none     Labs:                        8.1    10.70 )-----------( 227      ( 04 Sep 2021 00:40 )             26.3     09-04    137  |  100  |  20  ----------------------------<  143<H>  4.0   |  29  |  0.48<L>    Ca    10.1      04 Sep 2021 00:40  Phos  3.0     09-04  Mg     1.8     09-04    TPro  8.0  /  Alb  3.1<L>  /  TBili  0.5  /  DBili  x   /  AST  79<H>  /  ALT  178<H>  /  AlkPhos  318<H>  09-04                Lactate Dehydrogenase, Serum: 189 U/L (09-03 @ 00:30)  Lactate Dehydrogenase, Serum: 216 U/L (09-02 @ 00:41)

## 2021-09-04 NOTE — PROGRESS NOTE ADULT - SUBJECTIVE AND OBJECTIVE BOX
Chief Complaint/Follow-up on: Hyperthyroidism and Steroid Induced Hyperglycemia    Subjective: patient with trach collar and NG tube, He waves when asked how he was doing today. Moving all extremities and following commands. No complaints    MEDICATIONS  (STANDING):  acetylcysteine 20%  Inhalation 3 milliLiter(s) Inhalation every 6 hours  albuterol/ipratropium for Nebulization 3 milliLiter(s) Nebulizer every 6 hours  dextrose 40% Gel 15 Gram(s) Oral once  dextrose 5%. 1000 milliLiter(s) (50 mL/Hr) IV Continuous <Continuous>  dextrose 5%. 1000 milliLiter(s) (100 mL/Hr) IV Continuous <Continuous>  dextrose 50% Injectable 25 Gram(s) IV Push once  dextrose 50% Injectable 12.5 Gram(s) IV Push once  dextrose 50% Injectable 25 Gram(s) IV Push once  glucagon  Injectable 1 milliGRAM(s) IntraMuscular once  heparin   Injectable 5000 Unit(s) SubCutaneous every 8 hours  hydrocortisone sodium succinate Injectable 50 milliGRAM(s) IV Push every 8 hours  insulin lispro (ADMELOG) corrective regimen sliding scale   SubCutaneous every 6 hours  insulin NPH human recombinant 12 Unit(s) SubCutaneous <User Schedule>  insulin NPH human recombinant 10 Unit(s) SubCutaneous <User Schedule>  methimazole 20 milliGRAM(s) Oral <User Schedule>  metoclopramide Injectable 10 milliGRAM(s) IV Push every 8 hours  mirtazapine 7.5 milliGRAM(s) Oral daily  multiple electrolytes Injection Type 1 1000 milliLiter(s) (30 mL/Hr) IV Continuous <Continuous>  multivitamin 1 Tablet(s) Oral daily  pantoprazole  Injectable 40 milliGRAM(s) IV Push every 12 hours  propranolol 40 milliGRAM(s) Oral every 6 hours  sertraline 100 milliGRAM(s) Oral daily  sodium chloride 0.9% for Nebulization 3 milliLiter(s) Nebulizer every 6 hours  thiamine 100 milliGRAM(s) Oral daily    MEDICATIONS  (PRN):  ondansetron Injectable 4 milliGRAM(s) IV Push every 6 hours PRN Nausea and/or Vomiting  simethicone 80 milliGRAM(s) Chew every 8 hours PRN Gas      PHYSICAL EXAM:  VITALS: T(C): 35.8 (09-04-21 @ 12:00)  T(F): 96.5 (09-04-21 @ 12:00), Max: 98 (09-04-21 @ 00:00)  HR: 85 (09-04-21 @ 17:15) (82 - 107)  BP: --  RR:  (13 - 53)  SpO2:  (98% - 100%)    GENERAL: NAD, malnourished  EYES: No proptosis, no injection  HEENT:  Atraumatic, Normocephalic, dry mucous membranes, trach collar, NG tube in place  RESPIRATORY: Rhonchorous in b/l anterior lung fields. Increased I:E ratio and resp effort  CARDIOVASCULAR: Regular rate and rhythm; no peripheral edema  GI: Soft, nontender, non distended, hypoactive bowel sounds  CUSHING'S SIGNS: no striae    POCT Blood Glucose.: 126 mg/dL (09-04-21 @ 17:12)  POCT Blood Glucose.: 143 mg/dL (09-04-21 @ 12:04)  POCT Blood Glucose.: 114 mg/dL (09-04-21 @ 06:04)  POCT Blood Glucose.: 141 mg/dL (09-04-21 @ 00:32)    POCT Blood Glucose.: 126 mg/dL (09-03-21 @ 17:01)  POCT Blood Glucose.: 136 mg/dL (09-03-21 @ 11:27)  POCT Blood Glucose.: 196 mg/dL (09-03-21 @ 00:02)    POCT Blood Glucose.: 168 mg/dL (09-02-21 @ 18:08)  POCT Blood Glucose.: 113 mg/dL (09-02-21 @ 13:59)  POCT Blood Glucose.: 98 mg/dL (09-02-21 @ 06:06)    POCT Blood Glucose.: 122 mg/dL (09-01-21 @ 23:58)  POCT Blood Glucose.: 108 mg/dL (09-01-21 @ 18:04)    09-04    137  |  100  |  20  ----------------------------<  143<H>  4.0   |  29  |  0.48<L>    EGFR if : 134  EGFR if non : 116    Ca    10.1      09-04  Mg     1.8     09-04  Phos  3.0     09-04    TPro  8.0  /  Alb  3.1<L>  /  TBili  0.5  /  DBili  x   /  AST  79<H>  /  ALT  178<H>  /  AlkPhos  318<H>  09-04          Thyroid Function Tests:  09-04 @ 09:29 TSH <0.01 FreeT4 -- T3 165 Anti TPO -- Anti Thyroglobulin Ab -- TSI --  09-02 @ 15:18 TSH <0.01 FreeT4 -- T3 171 Anti TPO -- Anti Thyroglobulin Ab -- TSI --    Historical Values  T4, Serum (09.04.21 @ 09:29)   T4, Serum: 24.5: Test Repeated ug/dL   T4, Serum (09.02.21 @ 15:18)   T4, Serum: 24.9: Test Repeated ug/dL   T4, Serum (08.29.21 @ 04:01)   T4, Serum: 24.9: Test Repeated ug/dL   T4, Serum (08.27.21 @ 03:42)   T4, Serum: 24.9: Test Repeated ug/dL   T4, Serum (08.25.21 @ 03:32)   T4, Serum: 24.9: Test Repeated ug/dL   T4, Serum (08.21.21 @ 22:13)   T4, Serum: 24.9: Test Repeated ug/dL   T4, Serum (06.15.21 @ 00:52)   T4, Serum: 8.3 ug/dL   T4, Serum (10.05.17 @ 07:13)   T4, Serum: 8.0 ug/dL   T4, Serum (09.01.17 @ 20:17)   T4, Serum: 7.4 ug/dL     Historical Values  Triiodothyronine, Total (T3 Total) (09.04.21 @ 09:29)   Triiodothyronine, Total (T3 Total): 165 ng/dL   Triiodothyronine, Total (T3 Total) (09.02.21 @ 15:18)   Triiodothyronine, Total (T3 Total): 171 ng/dL   Triiodothyronine, Total (T3 Total) (08.29.21 @ 04:01)   Triiodothyronine, Total (T3 Total): 219 ng/dL   Triiodothyronine, Total (T3 Total) (08.27.21 @ 03:42)   Triiodothyronine, Total (T3 Total): 276 ng/dL   Triiodothyronine, Total (T3 Total) (08.25.21 @ 03:32)   Triiodothyronine, Total (T3 Total): 343 ng/dL   Triiodothyronine, Total (T3 Total) (08.21.21 @ 22:13)   Triiodothyronine, Total (T3 Total): 208 ng/dL   Triiodothyronine, Total (T3 Total) (08.08.21 @ 08:14)   Triiodothyronine, Total (T3 Total): 206 ng/dL   Triiodothyronine, Total (T3 Total) (06.15.21 @ 00:52)   Triiodothyronine, Total (T3 Total): 54 ng/dL   Triiodothyronine, Total (T3 Total) (10.05.17 @ 07:13)   Triiodothyronine, Total (T3 Total): 72 ng/dL     Free Thyroxine, Serum in AM (08.30.21 @ 10:10)   Free Thyroxine, Serum: >7.8: Test Repeated ng/dL

## 2021-09-04 NOTE — PROGRESS NOTE ADULT - SUBJECTIVE AND OBJECTIVE BOX
RICKY HAMPTON  MRN-[id number]  Patient is a 66y old  Male who presents with a chief complaint of Anemia, Supratherapeutic INR, Dark Stools (04 Sep 2021 15:09)    HPI:  65M PMH ACC/AHA stage D HF due to NICM HM2 LVAD , TV annuloplasty ring 17 as destination therapy due to severe peripheral artery disease with significant stenosis  SIADH, Depression, CKD-3 with hyperkalemia, past E. coli UTIs, driveline drainage (21) and COVID-19 (back in 2020)  He was recently seen in clinic where he complained of abdominal pain and dark stools w constipation back in May. He presents to [facility] ER today weakness and fatigue, moderate and + Black stools for three days, on coumadin secondary to warfarin use in the setting of an LVAD. Patient has required transfusions for GIB in the past. Mostly recently back in 2021 pt had anemia with dark stools. No interventions was done at that time. However Last Endoscopy was done in 2020 (negative). Today labs show patient is anemic with H/H of 4.5/16.3,. INR is 8.84 MAP in the 90s, Temp 35.1. He denies any chest pain, shortness of breath, dizziness, abd pain, nausea or vomiting.       (2021 16:57)      Surgery/Hospital course:    Intubated:     Today:    REVIEW OF SYSTEMS:   GEN: no complaints, generalized weakness  Neuro: denies lightheadedness, dizziness, changes in vision  CV: denies chest pain, palpitations  RESP: denies shortness of breath, difficulty breathing  GI: denies nausea, abd discomfort/pain  Ext: denies edema      Physical Exam:  Neuro: A&O x4, BLANDON, no focal deficits  CV: S1S2, normal heart sounds  Resp: diminished at bilateral bases  GI: abd soft non tender, non distended  : aleman with clear yellow urine  Ext: warm and well perfused, + distal pulses  Lines:     Vital Signs Last 24 Hrs  T(C): 35.8 (04 Sep 2021 12:00), Max: 36.7 (03 Sep 2021 16:00)  T(F): 96.5 (04 Sep 2021 12:00), Max: 98.1 (03 Sep 2021 16:00)  HR: 90 (04 Sep 2021 15:10) (82 - 107)  BP: --  BP(mean): --  RR: 28 (04 Sep 2021 15:10) (13 - 53)  SpO2: 100% (04 Sep 2021 15:10) (100% - 100%)        ============================I/O===========================   I&O's Detail    03 Sep 2021 07:01  -  04 Sep 2021 07:00  --------------------------------------------------------  IN:    Enteral Tube Flush: 300 mL    Miscellaneous Tube Feedin mL    multiple electrolytes Injection Type 1.: 720 mL  Total IN: 1650 mL    OUT:    Drain (mL): 370 mL    Voided (mL): 1100 mL  Total OUT: 1470 mL    Total NET: 180 mL      04 Sep 2021 07:01  -  04 Sep 2021 15:19  --------------------------------------------------------  IN:    Enteral Tube Flush: 100 mL    Miscellaneous Tube Feedin mL    multiple electrolytes Injection Type 1.: 240 mL  Total IN: 615 mL    OUT:    Drain (mL): 100 mL    Voided (mL): 200 mL  Total OUT: 300 mL    Total NET: 315 mL        ============================ LABS =========================                        8.1    10.70 )-----------( 227      ( 04 Sep 2021 00:40 )             26.3     09-04    137  |  100  |  20  ----------------------------<  143<H>  4.0   |  29  |  0.48<L>    Ca    10.1      04 Sep 2021 00:40  Phos  3.0       Mg     1.8         TPro  8.0  /  Alb  3.1<L>  /  TBili  0.5  /  DBili  x   /  AST  79<H>  /  ALT  178<H>  /  AlkPhos  318<H>      LIVER FUNCTIONS - ( 04 Sep 2021 00:40 )  Alb: 3.1 g/dL / Pro: 8.0 g/dL / ALK PHOS: 318 U/L / ALT: 178 U/L / AST: 79 U/L / GGT: x             ABG - ( 04 Sep 2021 00:36 )  pH, Arterial: 7.40  pH, Blood: x     /  pCO2: 54    /  pO2: 167   / HCO3: 33    / Base Excess: 7.0   /  SaO2: 99.8                ======================Micro/Rad/Cardio=================  Culture: Reviewed   CXR: Reviewed  Echo: Reviewed  ======================================================  PAST MEDICAL & SURGICAL HISTORY:  CHF (congestive heart failure)    CAD (coronary artery disease)    Depression    Pleural effusion    History of 2019 novel coronavirus disease (COVID-19)  2020    Hemorrhoids    Bleeding hemorrhoids    Peripheral arterial disease    Claudication    BPH with urinary obstruction    ACC/AHA stage D systolic heart failure    Anticoagulation goal of INR 2.0 to 2.5    Falls    Clavicle fracture    CKD (chronic kidney disease), stage III    Iron deficiency anemia    H/O epistaxis    Vertigo    GI bleed    S/P TVR (tricuspid valve repair)    S/P ventricular assist device    S/P endoscopy      ====================ASSESSMENT ==============        ====================== NEUROLOGY=====================  metoclopramide Injectable 10 milliGRAM(s) IV Push every 8 hours  mirtazapine 7.5 milliGRAM(s) Oral daily  ondansetron Injectable 4 milliGRAM(s) IV Push every 6 hours PRN Nausea and/or Vomiting  sertraline 100 milliGRAM(s) Oral daily        ==================== RESPIRATORY======================  Mechanical Ventilation:    acetylcysteine 20%  Inhalation 3 milliLiter(s) Inhalation every 6 hours  albuterol/ipratropium for Nebulization 3 milliLiter(s) Nebulizer every 6 hours  sodium chloride 0.9% for Nebulization 3 milliLiter(s) Nebulizer every 6 hours        ====================CARDIOVASCULAR==================  propranolol 40 milliGRAM(s) Oral every 6 hours        ===================HEMATOLOGIC/ONC ===================  heparin   Injectable 5000 Unit(s) SubCutaneous every 8 hours        ===================== RENAL =========================  Continue monitoring urine output          ==================== GASTROINTESTINAL===================  dextrose 5%. 1000 milliLiter(s) (50 mL/Hr) IV Continuous <Continuous>  dextrose 5%. 1000 milliLiter(s) (100 mL/Hr) IV Continuous <Continuous>  multiple electrolytes Injection Type 1 1000 milliLiter(s) (30 mL/Hr) IV Continuous <Continuous>  multivitamin 1 Tablet(s) Oral daily  pantoprazole  Injectable 40 milliGRAM(s) IV Push every 12 hours  simethicone 80 milliGRAM(s) Chew every 8 hours PRN Gas  thiamine 100 milliGRAM(s) Oral daily      Continue tube feeds: goal:   Continue pepcid for GI prophylaxis    =======================    ENDOCRINE  =====================  dextrose 40% Gel 15 Gram(s) Oral once  dextrose 50% Injectable 25 Gram(s) IV Push once  dextrose 50% Injectable 12.5 Gram(s) IV Push once  dextrose 50% Injectable 25 Gram(s) IV Push once  glucagon  Injectable 1 milliGRAM(s) IntraMuscular once  hydrocortisone sodium succinate Injectable 50 milliGRAM(s) IV Push every 8 hours  insulin lispro (ADMELOG) corrective regimen sliding scale   SubCutaneous every 6 hours  insulin NPH human recombinant 12 Unit(s) SubCutaneous <User Schedule>  insulin NPH human recombinant 10 Unit(s) SubCutaneous <User Schedule>  methimazole 20 milliGRAM(s) Oral <User Schedule>          ========================INFECTIOUS DISEASE================            Patient requires continuous monitoring with bedside rhythm monitoring, arterial line, pulse oximetry, ventilator monitoring and intermittent blood gas analysis.  Care plan discussed with ICU care team.  Patient remained critical; required more than usual post op care; I have spent 35 minutes providing non-routine post op care, revaluated multiple times during the day.           RICKY HAMPTON  MRN-[id number]  Patient is a 66y old  Male who presents with a chief complaint of Anemia, Supratherapeutic INR, Dark Stools (04 Sep 2021 15:09)    HPI:  65M PMH ACC/AHA stage D HF due to NICM HM2 LVAD , TV annuloplasty ring 17 as destination therapy due to severe peripheral artery disease with significant stenosis  SIADH, Depression, CKD-3 with hyperkalemia, past E. coli UTIs, driveline drainage (21) and COVID-19 (back in 2020)  He was recently seen in clinic where he complained of abdominal pain and dark stools w constipation back in May. He presents to [facility] ER today weakness and fatigue, moderate and + Black stools for three days, on coumadin secondary to warfarin use in the setting of an LVAD. Patient has required transfusions for GIB in the past. Mostly recently back in 2021 pt had anemia with dark stools. No interventions was done at that time. However Last Endoscopy was done in 2020 (negative). Today labs show patient is anemic with H/H of 4.5/16.3,. INR is 8.84 MAP in the 90s, Temp 35.1. He denies any chest pain, shortness of breath, dizziness, abd pain, nausea or vomiting.       (2021 16:57)      Surgery/Hospital Course:   admit for melena w/ anemia, INR 8.84   6/15 Capsul study (+) for small bowel bleed, balloon endoscopy (old blood in prox ileum); post EGD - septic w/ L opacity, re-intubated for concern for aspiration, TTE (Mod MR, decrease biV w/ interventricular septum boweing towards R)   bronch    +C Diff    CT C/A/P: Fluid filled colon which may be 2/2 rapid transit. Small bilateral pleffs with associates. Compressive atelectasis New ISABELLE & LLL  parenchymal opacities, suspicious for pneumonia. 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    #8 Shiley trach at bedside    LVAD speed increased to 9200   Bronch   TC since . Patient transferred to SDU.    INR today 2.64.  H&H 7.3/24 this AM.  Will repeat CBC at noon, and will send stool guaiac Patient with persistent abdominal tenderness, rate of tube feeds decreased.  No nausea/vomiting.     INR today 2.4. H&H 9.1/28.6 low flow overnight /N&V, refusing Tube feeds on D5 1/2 normal  @50 cc/hr   INR 2.69  H&H 7.7/.1 refusing Tube feeds on D5 1/2 normal  @50 cc/hr. This am + BM Melena Dr Oneill HF  aware- PRBC x1  GI team consulted -  NPO  plan on study in am-  D/w Dr Cadet Patient  to return to CTU for further management; 1PRBCS    Post op INR 2.2 today.  No bleeding. BC + for SM.  Pt is hypotensive requiring pressor and inotropic support.  ID follow up today on Cefepime will follow.   R PTC for PTX    CT C/A/P: sub q emphysema in R chest wall, GGO RUL, small ascites CTH negative; Abd US: GB thickening, pericholecystic fluid     Perchole drain in place continues to drain total output overnight 133.  Fever today 38.8    duplex LE negative    Patient with persistent abdominal pain, refusing tube feeds and medications, Psych consulted   CTA A/P ordered to r/o mesenteric ischemia 2/2 persistent anorexia, nausea, vomiting. Revealed:  Evaluation of the mesenteric vessels is limited by streak artifact from LVAD. There appears to be severe stenosis of the proximal SMA; abdominal mesenteric doppler is recommended for further evaluation. 2.  No small bowel findings to suggest acute mesenteric ischemia. 3.  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  8/15: Cultures resulted BC 1/2 +GPC in clusters, SC enterobacter; mesenteric duplex: borderline stenosis of proximal SMA  : CT C/A/P noncon: Nondular opacities in R lung apex w/cavitation, abd nl  :  Continue current care, treatment of thyrotoxicosis with medications as per endocrine, d/c ABX as per team.       24 hour events:   ·	Has remained on continuous TC since , still with copious secretions, able to cough most up but still requires intermittent suctioning  ·	Cefepime d/c yesterday, WBC remain 10, normothermic  ·	No emesis overnight on tube feeds 30ml/hr, increased to 35ml      REVIEW OF SYSTEMS:   Patient speaks over trach   Gen: No fever, leg pain improved  EYES/ENT: No visual changes;  No vertigo or throat pain   NECK: No pain   RES:  No shortness of breath or Cough  CARD: No chest pain   GI: +abdominal pain, denies nausea at this time  : No dysuria  NEURO: No weakness; + lower extremity pain, improved today  SKIN: No itching, rashes     Physical Exam:  Gen:  Awake and alert, Sitting in chair in NAD.  Psych: Mood greatly improved but waxes and wanes, more interactive and participating in care. Would like to be more included in bedside rounding.  CNS:  intact, nonfocal, responds to all commands  Neck: no JVD, +TC   RES : course breath sounds, no wheezing, moderate tan secretions able to cough most up on own         CVS: +LVAD hum   Abd: Soft, RUQ tenderness by perc sybil site. Sybil drain with bilious fluid. Positive BS throughout.  Skin: No rash  Ext:  no edema      Vital Signs Last 24 Hrs  T(C): 35.8 (04 Sep 2021 12:00), Max: 36.7 (03 Sep 2021 16:00)  T(F): 96.5 (04 Sep 2021 12:00), Max: 98.1 (03 Sep 2021 16:00)  HR: 90 (04 Sep 2021 15:10) (82 - 107)  BP: --  BP(mean): --  RR: 28 (04 Sep 2021 15:10) (13 - 53)  SpO2: 100% (04 Sep 2021 15:10) (100% - 100%)        ============================I/O===========================   I&O's Detail    03 Sep 2021 07:01  -  04 Sep 2021 07:00  --------------------------------------------------------  IN:    Enteral Tube Flush: 300 mL    Miscellaneous Tube Feedin mL    multiple electrolytes Injection Type 1.: 720 mL  Total IN: 1650 mL    OUT:    Drain (mL): 370 mL    Voided (mL): 1100 mL  Total OUT: 1470 mL    Total NET: 180 mL      04 Sep 2021 07:01  -  04 Sep 2021 15:19  --------------------------------------------------------  IN:    Enteral Tube Flush: 100 mL    Miscellaneous Tube Feedin mL    multiple electrolytes Injection Type 1.: 240 mL  Total IN: 615 mL    OUT:    Drain (mL): 100 mL    Voided (mL): 200 mL  Total OUT: 300 mL    Total NET: 315 mL        ============================ LABS =========================                        8.1    10.70 )-----------( 227      ( 04 Sep 2021 00:40 )             26.3         137  |  100  |  20  ----------------------------<  143<H>  4.0   |  29  |  0.48<L>    Ca    10.1      04 Sep 2021 00:40  Phos  3.0       Mg     1.8         TPro  8.0  /  Alb  3.1<L>  /  TBili  0.5  /  DBili  x   /  AST  79<H>  /  ALT  178<H>  /  AlkPhos  318<H>      LIVER FUNCTIONS - ( 04 Sep 2021 00:40 )  Alb: 3.1 g/dL / Pro: 8.0 g/dL / ALK PHOS: 318 U/L / ALT: 178 U/L / AST: 79 U/L / GGT: x             ABG - ( 04 Sep 2021 00:36 )  pH, Arterial: 7.40  pH, Blood: x     /  pCO2: 54    /  pO2: 167   / HCO3: 33    / Base Excess: 7.0   /  SaO2: 99.8                ======================Micro/Rad/Cardio=================  Culture: Reviewed   CXR: Reviewed  Echo: Reviewed  ======================================================  PAST MEDICAL & SURGICAL HISTORY:  CHF (congestive heart failure)    CAD (coronary artery disease)    Depression    Pleural effusion    History of  novel coronavirus disease (COVID-19)  2020    Hemorrhoids    Bleeding hemorrhoids    Peripheral arterial disease    Claudication    BPH with urinary obstruction    ACC/AHA stage D systolic heart failure    Anticoagulation goal of INR 2.0 to 2.5    Falls    Clavicle fracture    CKD (chronic kidney disease), stage III    Iron deficiency anemia    H/O epistaxis    Vertigo    GI bleed    S/P TVR (tricuspid valve repair)    S/P ventricular assist device    S/P endoscopy      ====================ASSESSMENT ==============  Stage D Nonischemic Cardiomyopathy, Status Post HM2 on 2017    Cardiogenic shock  Hemodynamic instability   Acute hypoxemic respiratory failure s/p trach   GI bleed , Status Post Enteroscopy   Anemia, in setting of melena   Chronic Kidney Disease  Stress hyperglycemia   C.diff positive on    Hypovolemic shock  Septic shock  Leukocytosis  GB thickening/percholecystic s/p perc choley by IT   SMA stenosis  Serratia/citrobacter pneumonia   Stenotrophomonas pneumonia   Enterobacter pneumonia   Nasuea/vomiting  Deconditioning      ====================== NEUROLOGY=====================  Nonfocal, continue to monitor neuro status   C/w mirtazapine and sertraline for depression  Deconditioned, PT/Ambulate as tolerated    mirtazapine 7.5 milliGRAM(s) Oral daily  sertraline 100 milliGRAM(s) Oral daily    ==================== RESPIRATORY======================  Acute hypoxemic respiratory failure s/p #8 betzaida pritchard on , continue TC as tolerated (TC since )  Continue close monitoring of respiratory rate and breathing pattern, following of ABG’s with A-line monitoring, continuous pulse oximetry monitoring.   Moderate secretions, continue suctioning prn, pulmonary toileting. Will add mucomyst/duonebs/hypertonic saline with volara airway clearance device to add in secretion mobilization. Goal is to improve secretion management in order to allow for longer passymuir toleration for possible swallow study next week    acetylcysteine 20%  Inhalation 3 milliLiter(s) Inhalation every 6 hours  albuterol/ipratropium for Nebulization 3 milliLiter(s) Nebulizer every 6 hours  sodium chloride 0.9% for Nebulization 3 milliLiter(s) Nebulizer every 6 hours        ====================CARDIOVASCULAR==================  Stage D Nonischemic Cardiomyopathy, Status Post HM2 on 2017; LVAD settings 9200, flow 5  TTE : reveals at least moderate MR, mild AI, severe global LV syst dysfxn, dec RV fxn   Continue invasive hemodynamic monitoring   Propranolol for rate control of HR 2/2 Hyperthyroidism, titrate as tolerated per Endo    propranolol 40 milliGRAM(s) Oral every 6 hours    ===================HEMATOLOGIC/ONC ===================  Acute blood loss anemia, monitor H&H/Plts    Holding coumadin and aspirin indefinitely given recurrent GI bleeding. Continue to monitor LDH daily.  Continue Heparin for venous thromboembolism prophylaxis.     heparin   Injectable 5000 Unit(s) SubCutaneous every 8 hours    ===================== RENAL =========================  Continue to monitor I/Os, BUN/Creatinine, and urine output.   Euvolemic, Even fluid balance, currently off diuretics.  Replete lytes PRN. Keep K> 4 and Mg >2.     ==================== GASTROINTESTINAL===================  S/p cholecystostomy tube placed on  with IR   CTA A/P : Evaluation of the mesenteric vessels is limited by streak artifact from LVAD. There appears to be severe stenosis of the proximal SMA; abdominal mesenteric doppler is recommended for further evaluation. 2.  No small bowel findings to suggest acute mesenteric ischemia. 3.  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  Mesenteric duplex on 8/15 borderline stenosis of proximal SMA   CT Abd/Pel on  without acute process   Reglan for gut motility  Zofran PRN nausea/vomiting     Increase tube feeds by 5ml/day until goal rate of 65cc, currently on 35cc.   Per Gen surg not candidate for cholecystectomy 2/2 functioning perc choley drain, RUQ US pending to assess for GB decompression as pt still with nausea and intermittent emesis.  Heart failure to re-discuss/address SMA stenosis for possible intervention, f/u.   Goal is to improve secretion management in order to allow for longer passymuir toleration for possible swallow study next week in setting of patient not tolerating tube feeds consistently      multiple electrolytes Injection Type 1 1000 milliLiter(s) (30 mL/Hr) IV Continuous <Continuous>  multivitamin 1 Tablet(s) Oral daily  pantoprazole  Injectable 40 milliGRAM(s) IV Push every 12 hours  simethicone 80 milliGRAM(s) Chew every 8 hours PRN Gas  thiamine 100 milliGRAM(s) Oral daily      Continue tube feeds: goal:   Continue pepcid for GI prophylaxis    =======================    ENDOCRINE  =====================  Stress hyperglycemia       - Continue glucose control with admelog sliding scale and NPH     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 - Free T4 remains elevated   Per endocrine      - Thyroid US when trach is out      - Continue with hydrocortisone 50q8      - Propanolol as above for optimal T4-->T3 conversion      - c/w Methimazole       - F/u thyroid studies and d/w endocrinology      hydrocortisone sodium succinate Injectable 50 milliGRAM(s) IV Push every 8 hours  insulin lispro (ADMELOG) corrective regimen sliding scale   SubCutaneous every 6 hours  insulin NPH human recombinant 12 Unit(s) SubCutaneous <User Schedule>  insulin NPH human recombinant 10 Unit(s) SubCutaneous <User Schedule>  methimazole 20 milliGRAM(s) Oral <User Schedule>      ========================INFECTIOUS DISEASE================  Afebrile, WBC currently 10.7  Continue trending WBC and monitoring fever curve   Culture from sybil drain on : NGTD  continue to monitor off antibiotics  ID following, MD Prater, appreciate recommendations                 RICKY HAMPTON  MRN-[id number]  Patient is a 66y old  Male who presents with a chief complaint of Anemia, Supratherapeutic INR, Dark Stools (04 Sep 2021 15:09)    HPI:  65M PMH ACC/AHA stage D HF due to NICM HM2 LVAD , TV annuloplasty ring 17 as destination therapy due to severe peripheral artery disease with significant stenosis  SIADH, Depression, CKD-3 with hyperkalemia, past E. coli UTIs, driveline drainage (21) and COVID-19 (back in 2020)  He was recently seen in clinic where he complained of abdominal pain and dark stools w constipation back in May. He presents to [facility] ER today weakness and fatigue, moderate and + Black stools for three days, on coumadin secondary to warfarin use in the setting of an LVAD. Patient has required transfusions for GIB in the past. Mostly recently back in 2021 pt had anemia with dark stools. No interventions was done at that time. However Last Endoscopy was done in 2020 (negative). Today labs show patient is anemic with H/H of 4.5/16.3,. INR is 8.84 MAP in the 90s, Temp 35.1. He denies any chest pain, shortness of breath, dizziness, abd pain, nausea or vomiting.       (2021 16:57)      Surgery/Hospital Course:   admit for melena w/ anemia, INR 8.84   6/15 Capsul study (+) for small bowel bleed, balloon endoscopy (old blood in prox ileum); post EGD - septic w/ L opacity, re-intubated for concern for aspiration, TTE (Mod MR, decrease biV w/ interventricular septum boweing towards R)   bronch    +C Diff    CT C/A/P: Fluid filled colon which may be 2/2 rapid transit. Small bilateral pleffs with associates. Compressive atelectasis New ISABELLE & LLL  parenchymal opacities, suspicious for pneumonia. 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    #8 Shiley trach at bedside    LVAD speed increased to 9200   Bronch   TC since . Patient transferred to SDU.    INR today 2.64.  H&H 7.3/24 this AM.  Will repeat CBC at noon, and will send stool guaiac Patient with persistent abdominal tenderness, rate of tube feeds decreased.  No nausea/vomiting.     INR today 2.4. H&H 9.1/28.6 low flow overnight /N&V, refusing Tube feeds on D5 1/2 normal  @50 cc/hr   INR 2.69  H&H 7.7/.1 refusing Tube feeds on D5 1/2 normal  @50 cc/hr. This am + BM Melena Dr Oneill HF  aware- PRBC x1  GI team consulted -  NPO  plan on study in am-  D/w Dr Cadet Patient  to return to CTU for further management; 1PRBCS    Post op INR 2.2 today.  No bleeding. BC + for SM.  Pt is hypotensive requiring pressor and inotropic support.  ID follow up today on Cefepime will follow.   R PTC for PTX    CT C/A/P: sub q emphysema in R chest wall, GGO RUL, small ascites CTH negative; Abd US: GB thickening, pericholecystic fluid     Perchole drain in place continues to drain total output overnight 133.  Fever today 38.8    duplex LE negative    Patient with persistent abdominal pain, refusing tube feeds and medications, Psych consulted   CTA A/P ordered to r/o mesenteric ischemia 2/2 persistent anorexia, nausea, vomiting. Revealed:  Evaluation of the mesenteric vessels is limited by streak artifact from LVAD. There appears to be severe stenosis of the proximal SMA; abdominal mesenteric doppler is recommended for further evaluation. 2.  No small bowel findings to suggest acute mesenteric ischemia. 3.  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  8/15: Cultures resulted BC 1/2 +GPC in clusters, SC enterobacter; mesenteric duplex: borderline stenosis of proximal SMA  : CT C/A/P noncon: Nondular opacities in R lung apex w/cavitation, abd nl  :  Continue current care, treatment of thyrotoxicosis with medications as per endocrine, d/c ABX as per team.       24 hour events:   ·	Has remained on continuous TC since , still with copious secretions, able to cough most up but still requires intermittent suctioning  ·	Cefepime d/c yesterday, WBC remain 10, normothermic  ·	No emesis overnight on tube feeds 30ml/hr, increased to 35ml      REVIEW OF SYSTEMS:   Patient speaks over trach   Gen: No fever, leg pain improved  EYES/ENT: No visual changes;  No vertigo or throat pain   NECK: No pain   RES:  No shortness of breath or Cough  CARD: No chest pain   GI: +abdominal pain, denies nausea at this time  : No dysuria  NEURO: No weakness; + lower extremity pain, improved today  SKIN: No itching, rashes     Physical Exam:  Gen:  Awake and alert, Sitting in chair in NAD.  Psych: Mood greatly improved but waxes and wanes, more interactive and participating in care. Would like to be more included in bedside rounding.  CNS:  intact, nonfocal, responds to all commands  Neck: no JVD, +TC   RES : course breath sounds, no wheezing, moderate tan secretions able to cough most up on own         CVS: +LVAD hum   Abd: Soft, RUQ tenderness by perc sybil site. Sybil drain with bilious fluid. Positive BS throughout.  Skin: No rash  Ext:  no edema      Vital Signs Last 24 Hrs  T(C): 35.8 (04 Sep 2021 12:00), Max: 36.7 (03 Sep 2021 16:00)  T(F): 96.5 (04 Sep 2021 12:00), Max: 98.1 (03 Sep 2021 16:00)  HR: 90 (04 Sep 2021 15:10) (82 - 107)  BP: --  BP(mean): --  RR: 28 (04 Sep 2021 15:10) (13 - 53)  SpO2: 100% (04 Sep 2021 15:10) (100% - 100%)        ============================I/O===========================   I&O's Detail    03 Sep 2021 07:01  -  04 Sep 2021 07:00  --------------------------------------------------------  IN:    Enteral Tube Flush: 300 mL    Miscellaneous Tube Feedin mL    multiple electrolytes Injection Type 1.: 720 mL  Total IN: 1650 mL    OUT:    Drain (mL): 370 mL    Voided (mL): 1100 mL  Total OUT: 1470 mL    Total NET: 180 mL      04 Sep 2021 07:01  -  04 Sep 2021 15:19  --------------------------------------------------------  IN:    Enteral Tube Flush: 100 mL    Miscellaneous Tube Feedin mL    multiple electrolytes Injection Type 1.: 240 mL  Total IN: 615 mL    OUT:    Drain (mL): 100 mL    Voided (mL): 200 mL  Total OUT: 300 mL    Total NET: 315 mL        ============================ LABS =========================                        8.1    10.70 )-----------( 227      ( 04 Sep 2021 00:40 )             26.3         137  |  100  |  20  ----------------------------<  143<H>  4.0   |  29  |  0.48<L>    Ca    10.1      04 Sep 2021 00:40  Phos  3.0       Mg     1.8         TPro  8.0  /  Alb  3.1<L>  /  TBili  0.5  /  DBili  x   /  AST  79<H>  /  ALT  178<H>  /  AlkPhos  318<H>      LIVER FUNCTIONS - ( 04 Sep 2021 00:40 )  Alb: 3.1 g/dL / Pro: 8.0 g/dL / ALK PHOS: 318 U/L / ALT: 178 U/L / AST: 79 U/L / GGT: x             ABG - ( 04 Sep 2021 00:36 )  pH, Arterial: 7.40  pH, Blood: x     /  pCO2: 54    /  pO2: 167   / HCO3: 33    / Base Excess: 7.0   /  SaO2: 99.8                ======================Micro/Rad/Cardio=================  Culture: Reviewed   CXR: Reviewed  Echo: Reviewed  ======================================================  PAST MEDICAL & SURGICAL HISTORY:  CHF (congestive heart failure)    CAD (coronary artery disease)    Depression    Pleural effusion    History of  novel coronavirus disease (COVID-19)  2020    Hemorrhoids    Bleeding hemorrhoids    Peripheral arterial disease    Claudication    BPH with urinary obstruction    ACC/AHA stage D systolic heart failure    Anticoagulation goal of INR 2.0 to 2.5    Falls    Clavicle fracture    CKD (chronic kidney disease), stage III    Iron deficiency anemia    H/O epistaxis    Vertigo    GI bleed    S/P TVR (tricuspid valve repair)    S/P ventricular assist device    S/P endoscopy      ====================ASSESSMENT ==============  Stage D Nonischemic Cardiomyopathy, Status Post HM2 on 2017    Cardiogenic shock  Hemodynamic instability   Acute hypoxemic respiratory failure s/p trach   GI bleed , Status Post Enteroscopy   Anemia, in setting of melena   Chronic Kidney Disease  Stress hyperglycemia   C.diff positive on    Hypovolemic shock  Septic shock  Leukocytosis  GB thickening/percholecystic s/p perc choley by IT   SMA stenosis  Serratia/citrobacter pneumonia   Stenotrophomonas pneumonia   Enterobacter pneumonia   Nasuea/vomiting  Deconditioning      ====================== NEUROLOGY=====================  Nonfocal, continue to monitor neuro status   C/w mirtazapine and sertraline for depression  Deconditioned, PT/Ambulate as tolerated    mirtazapine 7.5 milliGRAM(s) Oral daily  sertraline 100 milliGRAM(s) Oral daily    ==================== RESPIRATORY======================  Acute hypoxemic respiratory failure s/p #8 betzaida pritchard on , continue TC as tolerated (TC since )  Continue close monitoring of respiratory rate and breathing pattern, following of ABG’s with A-line monitoring, continuous pulse oximetry monitoring.   Moderate secretions, continue suctioning prn, pulmonary toileting. Will add mucomyst/duonebs/hypertonic saline with volara airway clearance device to add in secretion mobilization. Goal is to improve secretion management in order to allow for longer passymuir toleration for possible swallow study next week    acetylcysteine 20%  Inhalation 3 milliLiter(s) Inhalation every 6 hours  albuterol/ipratropium for Nebulization 3 milliLiter(s) Nebulizer every 6 hours  sodium chloride 0.9% for Nebulization 3 milliLiter(s) Nebulizer every 6 hours        ====================CARDIOVASCULAR==================  Stage D Nonischemic Cardiomyopathy, Status Post HM2 on 2017; LVAD settings 9200, flow 5  TTE : reveals at least moderate MR, mild AI, severe global LV syst dysfxn, dec RV fxn   Continue invasive hemodynamic monitoring   Propranolol for rate control of HR 2/2 Hyperthyroidism, titrate as tolerated per Endo    propranolol 40 milliGRAM(s) Oral every 6 hours    ===================HEMATOLOGIC/ONC ===================  Acute blood loss anemia, monitor H&H/Plts    Holding coumadin and aspirin indefinitely given recurrent GI bleeding. Continue to monitor LDH daily.  Continue Heparin for venous thromboembolism prophylaxis.     heparin   Injectable 5000 Unit(s) SubCutaneous every 8 hours    ===================== RENAL =========================  Continue to monitor I/Os, BUN/Creatinine, and urine output.   Euvolemic, Even fluid balance, currently off diuretics.  Replete lytes PRN. Keep K> 4 and Mg >2.     ==================== GASTROINTESTINAL===================  S/p cholecystostomy tube placed on  with IR   CTA A/P : Evaluation of the mesenteric vessels is limited by streak artifact from LVAD. There appears to be severe stenosis of the proximal SMA; abdominal mesenteric doppler is recommended for further evaluation. 2.  No small bowel findings to suggest acute mesenteric ischemia. 3.  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  Mesenteric duplex on 8/15 borderline stenosis of proximal SMA   CT Abd/Pel on  without acute process   Reglan for gut motility  Zofran PRN nausea/vomiting     Increase tube feeds by 5ml/day until goal rate of 65cc, currently on 35cc.   Per Gen surg not candidate for cholecystectomy 2/2 functioning perc choley drain, RUQ US pending to assess for GB decompression as pt still with nausea and intermittent emesis.  Heart failure to re-discuss/address SMA stenosis for possible intervention, f/u.   Goal is to improve secretion management in order to allow for longer passymuir toleration for possible swallow study next week in setting of patient not tolerating tube feeds consistently      multiple electrolytes Injection Type 1 1000 milliLiter(s) (30 mL/Hr) IV Continuous <Continuous>  multivitamin 1 Tablet(s) Oral daily  pantoprazole  Injectable 40 milliGRAM(s) IV Push every 12 hours  simethicone 80 milliGRAM(s) Chew every 8 hours PRN Gas  thiamine 100 milliGRAM(s) Oral daily      Continue tube feeds: goal:   Continue pepcid for GI prophylaxis    =======================    ENDOCRINE  =====================  Stress hyperglycemia       - Continue glucose control with admelog sliding scale and NPH     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 - Free T4 remains elevated   Per endocrine      - Thyroid US when trach is out      - Continue with hydrocortisone 50q8      - Propanolol as above for optimal T4-->T3 conversion      - c/w Methimazole ---> consider changing to rectal if continues to have emesis for better absoption      - f/u GI if any contraindications for cholestyramine with sybil drain, per endo recs can be added for hyperthroidism      - F/u thyroid studies and d/w endocrinology      hydrocortisone sodium succinate Injectable 50 milliGRAM(s) IV Push every 8 hours  insulin lispro (ADMELOG) corrective regimen sliding scale   SubCutaneous every 6 hours  insulin NPH human recombinant 12 Unit(s) SubCutaneous <User Schedule>  insulin NPH human recombinant 10 Unit(s) SubCutaneous <User Schedule>  methimazole 20 milliGRAM(s) Oral <User Schedule>      ========================INFECTIOUS DISEASE================  Afebrile, WBC currently 10.7  Continue trending WBC and monitoring fever curve   Culture from sybil drain on : NGTD  continue to monitor off antibiotics  ID following, MD Prater, appreciate recommendations                 RICKY HAMPTON  MRN-[id number]  Patient is a 66y old  Male who presents with a chief complaint of Anemia, Supratherapeutic INR, Dark Stools (04 Sep 2021 15:09)    HPI:  65M PMH ACC/AHA stage D HF due to NICM HM2 LVAD , TV annuloplasty ring 17 as destination therapy due to severe peripheral artery disease with significant stenosis  SIADH, Depression, CKD-3 with hyperkalemia, past E. coli UTIs, driveline drainage (21) and COVID-19 (back in 2020)  He was recently seen in clinic where he complained of abdominal pain and dark stools w constipation back in May. He presents to [facility] ER today weakness and fatigue, moderate and + Black stools for three days, on coumadin secondary to warfarin use in the setting of an LVAD. Patient has required transfusions for GIB in the past. Mostly recently back in 2021 pt had anemia with dark stools. No interventions was done at that time. However Last Endoscopy was done in 2020 (negative). Today labs show patient is anemic with H/H of 4.5/16.3,. INR is 8.84 MAP in the 90s, Temp 35.1. He denies any chest pain, shortness of breath, dizziness, abd pain, nausea or vomiting.     (2021 16:57)    Surgery/Hospital Course:   admit for melena w/ anemia, INR 8.84   6/15 Capsul study (+) for small bowel bleed, balloon endoscopy (old blood in prox ileum); post EGD - septic w/ L opacity, re-intubated for concern for aspiration, TTE (Mod MR, decrease biV w/ interventricular septum boweing towards R)   bronch    +C Diff    CT C/A/P: Fluid filled colon which may be 2/2 rapid transit. Small bilateral pleffs with associates. Compressive atelectasis New ISABELLE & LLL  parenchymal opacities, suspicious for pneumonia. 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    #8 Shiley trach at bedside    LVAD speed increased to 9200   Bronch   TC since . Patient transferred to SDU.    INR today 2.64.  H&H 7.3/24 this AM.  Will repeat CBC at noon, and will send stool guaiac Patient with persistent abdominal tenderness, rate of tube feeds decreased.  No nausea/vomiting.     INR today 2.4. H&H 9.1/28.6 low flow overnight /N&V, refusing Tube feeds on D5 1/2 normal  @50 cc/hr   INR 2.69  H&H 7.7/.1 refusing Tube feeds on D5 1/2 normal  @50 cc/hr. This am + BM Melena Dr Oneill HF  aware- PRBC x1  GI team consulted -  NPO  plan on study in am-  D/w Dr Cadet Patient  to return to CTU for further management; 1PRBCS    Post op INR 2.2 today.  No bleeding. BC + for SM.  Pt is hypotensive requiring pressor and inotropic support.  ID follow up today on Cefepime will follow.   R PTC for PTX    CT C/A/P: sub q emphysema in R chest wall, GGO RUL, small ascites CTH negative; Abd US: GB thickening, pericholecystic fluid     Perchole drain in place continues to drain total output overnight 133.  Fever today 38.8    duplex LE negative    Patient with persistent abdominal pain, refusing tube feeds and medications, Psych consulted   CTA A/P ordered to r/o mesenteric ischemia 2/2 persistent anorexia, nausea, vomiting. Revealed:  Evaluation of the mesenteric vessels is limited by streak artifact from LVAD. There appears to be severe stenosis of the proximal SMA; abdominal mesenteric doppler is recommended for further evaluation. 2.  No small bowel findings to suggest acute mesenteric ischemia. 3.  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  8/15: Cultures resulted BC 1/2 +GPC in clusters, SC enterobacter; mesenteric duplex: borderline stenosis of proximal SMA  : CT C/A/P noncon: Nondular opacities in R lung apex w/cavitation, abd nl  :  Continue current care, treatment of thyrotoxicosis with medications as per endocrine, d/c ABX as per team.     24 hour events:   ·	Has remained on continuous TC since , still with copious secretions, able to cough most up but still requires intermittent suctioning  ·	Cefepime d/c yesterday, WBC remain 10, normothermic  ·	No emesis overnight on tube feeds 35ml/hr, increased to 40ml    REVIEW OF SYSTEMS:   Patient speaks over trach   Gen: No fever, leg pain improved  EYES/ENT: No visual changes;  No vertigo or throat pain   NECK: No pain   RES:  No shortness of breath or Cough  CARD: No chest pain   GI: +abdominal pain, denies nausea at this time  : No dysuria  NEURO: No weakness; + lower extremity pain, improved today  SKIN: No itching, rashes     Physical Exam:  Gen:  Awake and alert, Sitting in chair in NAD.  Psych: Mood greatly improved but waxes and wanes, more interactive and participating in care. Would like to be more included in bedside rounding.  CNS:  intact, nonfocal, responds to all commands  Neck: no JVD, +TC   RES : course breath sounds, no wheezing, moderate tan secretions able to cough most up on own         CVS: +LVAD hum   Abd: Soft, RUQ tenderness by perc dawn site. Dawn drain with bilious fluid. Positive BS throughout.  Skin: No rash  Ext:  no edema    ICU Vital Signs Last 24 Hrs  T(C): 36.4 (05 Sep 2021 11:00), Max: 36.4 (05 Sep 2021 00:00)  T(F): 97.6 (05 Sep 2021 11:00), Max: 97.6 (05 Sep 2021 11:00)  HR: 89 (05 Sep 2021 12:15) (73 - 103)  BP(mean): 86  ABP: 92/74 (05 Sep 2021 12:15) (70/59 - 104/103)  ABP(mean): 83 (05 Sep 2021 12:15) (63 - 103)  RR: 19 (05 Sep 2021 12:15) (13 - 50)  SpO2: 100% (05 Sep 2021 12:15) (98% - 100%)    ============================I/O===========================  I&O's Detail    04 Sep 2021 07:01  -  05 Sep 2021 07:00  --------------------------------------------------------  IN:    Enteral Tube Flush: 200 mL    Miscellaneous Tube Feedin mL    multiple electrolytes Injection Type 1.: 720 mL  Total IN: 1755 mL    OUT:    Drain (mL): 400 mL    Voided (mL): 650 mL  Total OUT: 1050 mL    Total NET: 705 mL    05 Sep 2021 07:  -  05 Sep 2021 12:42  --------------------------------------------------------  IN:    Enteral Tube Flush: 60 mL    Miscellaneous Tube Feedin mL    multiple electrolytes Injection Type 1.: 150 mL  Total IN: 395 mL    OUT:                        7.8    8.82  )-----------( 227      ( 05 Sep 2021 01:48 )             25.3   Total OUT: 0 mL    Total NET: 395 mL    ============================ LABS =========================                         7.8    8.82  )-----------(       ( 05 Sep 2021 01:48 )             25.3     139  |  101  |  17  ----------------------------<  82  4.2   |  28  |  0.49<L>    Ca    10.1      05 Sep 2021 01:48  Phos  3.5       Mg     1.9     05    TPro  7.8  /  Alb  3.1<L>  /  TBili  0.4  /  DBili  x   /  AST  75<H>  /  ALT  179<H>  /  AlkPhos  289<H>      ======================Micro/Rad/Cardio=================  Culture: Reviewed   CXR: Reviewed  Echo: Reviewed    ======================================================  PAST MEDICAL & SURGICAL HISTORY:  CHF (congestive heart failure)    CAD (coronary artery disease)    Depression    Pleural effusion    History of  novel coronavirus disease (COVID-19)  2020    Hemorrhoids    Bleeding hemorrhoids    Peripheral arterial disease    Claudication    BPH with urinary obstruction    ACC/AHA stage D systolic heart failure    Anticoagulation goal of INR 2.0 to 2.5    Falls    Clavicle fracture    CKD (chronic kidney disease), stage III    Iron deficiency anemia    H/O epistaxis    Vertigo    GI bleed    S/P TVR (tricuspid valve repair)    S/P ventricular assist device    S/P endoscopy    ====================ASSESSMENT ==============  Stage D Nonischemic Cardiomyopathy, Status Post HM2 on 2017    Cardiogenic shock  Hemodynamic instability   Acute hypoxemic respiratory failure s/p trach   GI bleed , Status Post Enteroscopy   Anemia, in setting of melena   Chronic Kidney Disease  Stress hyperglycemia   C.diff positive on    Hypovolemic shock  Septic shock  Leukocytosis  GB thickening/percholecystic s/p perc choley by IT   SMA stenosis  Serratia/citrobacter pneumonia   Stenotrophomonas pneumonia   Enterobacter pneumonia   Nasuea/vomiting  Deconditioning    ====================== NEUROLOGY=====================  Nonfocal, continue to monitor neuro status   C/w mirtazapine and sertraline for depression  Deconditioned, PT/Ambulate as tolerated    mirtazapine 7.5 milliGRAM(s) Oral daily  sertraline 100 milliGRAM(s) Oral daily    ==================== RESPIRATORY======================  Acute hypoxemic respiratory failure s/p #8 shiley trach on , continue TC as tolerated (TC since )  Continue close monitoring of respiratory rate and breathing pattern, following of ABG’s with A-line monitoring, continuous pulse oximetry monitoring.   Moderate secretions, continue suctioning prn, pulmonary toileting. Will add mucomyst/duonebs/hypertonic saline with volara airway clearance device to add in secretion mobilization. Goal is to improve secretion management in order to allow for longer passymuir toleration for possible swallow study next week    acetylcysteine 20%  Inhalation 3 milliLiter(s) Inhalation every 6 hours  albuterol/ipratropium for Nebulization 3 milliLiter(s) Nebulizer every 6 hours  sodium chloride 0.9% for Nebulization 3 milliLiter(s) Nebulizer every 6 hours    ====================CARDIOVASCULAR==================  Stage D Nonischemic Cardiomyopathy, Status Post HM2 on 2017; LVAD settings 9200, flow 5  TTE : reveals at least moderate MR, mild AI, severe global LV syst dysfxn, dec RV fxn   Continue invasive hemodynamic monitoring   Propranolol for rate control of HR 2/2 Hyperthyroidism, titrate as tolerated per Endo    propranolol 40 milliGRAM(s) Oral every 6 hours    ===================HEMATOLOGIC/ONC ===================  Acute blood loss anemia, monitor H&H/Plts    Holding coumadin and aspirin indefinitely given recurrent GI bleeding. Continue to monitor LDH daily.  Continue Heparin for venous thromboembolism prophylaxis.     heparin   Injectable 5000 Unit(s) SubCutaneous every 8 hours    ===================== RENAL =========================  Continue to monitor I/Os, BUN/Creatinine, and urine output.   Euvolemic, Even fluid balance, currently off diuretics.  Replete lytes PRN. Keep K> 4 and Mg >2.     ==================== GASTROINTESTINAL===================  S/p cholecystostomy tube placed on  with IR   CTA A/P : Evaluation of the mesenteric vessels is limited by streak artifact from LVAD. There appears to be severe stenosis of the proximal SMA; abdominal mesenteric doppler is recommended for further evaluation. 2.  No small bowel findings to suggest acute mesenteric ischemia. 3.  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  Mesenteric duplex on 8/15 borderline stenosis of proximal SMA. ?Mesenteric angiogram with vascular this coming week.  CT Abd/Pel on  without acute process   Reglan for gut motility  Zofran PRN nausea/vomiting     Increase tube feeds by 5ml/day until goal rate of 65cc, currently on 40cc.   Per Gen surg not candidate for cholecystectomy 2/2 functioning perc choley drain, RUQ US pending to assess for GB decompression as pt still with nausea and intermittent emesis.  Heart failure to re-discuss/address SMA stenosis for possible intervention, f/u.   Goal is to improve secretion management in order to allow for longer passymuir toleration for possible swallow study next week in setting of patient not tolerating tube feeds consistently    multiple electrolytes Injection Type 1 1000 milliLiter(s) (30 mL/Hr) IV Continuous <Continuous>  multivitamin 1 Tablet(s) Oral daily  pantoprazole  Injectable 40 milliGRAM(s) IV Push every 12 hours  simethicone 80 milliGRAM(s) Chew every 8 hours PRN Gas  thiamine 100 milliGRAM(s) Oral daily    Continue pepcid for GI prophylaxis    =======================    ENDOCRINE  =====================  Stress hyperglycemia       - Continue glucose control with admelog sliding scale and NPH     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 Will initiate today.      - F/u thyroid studies and d/w endocrinology    hydrocortisone sodium succinate Injectable 50 milliGRAM(s) IV Push every 8 hours  insulin lispro (ADMELOG) corrective regimen sliding scale   SubCutaneous every 6 hours  insulin NPH human recombinant 12 Unit(s) SubCutaneous <User Schedule>  insulin NPH human recombinant 10 Unit(s) SubCutaneous <User Schedule>  methimazole 20 milliGRAM(s) Oral <User Schedule>    ========================INFECTIOUS DISEASE================  Afebrile, WBC currently 10.7  Continue trending WBC and monitoring fever curve   Culture from dawn drain on : NGTD  continue to monitor off antibiotics  ID following, MD Prater, appreciate recommendations    I have spent 30 minutes of noncontinuous critical care time with this patient.

## 2021-09-05 NOTE — PROGRESS NOTE ADULT - SUBJECTIVE AND OBJECTIVE BOX
RICKY JOINT  MRN#: [id number]  Subjective:  Pulmonary progress  : recurrent Acute hypoxic respiratory Failure ,aspiration pneumonia, NICM  , chart reviewed and H/O obtained radiological and Laboratory study reviewed patient Examined     65M PMH ACC/AHA stage D HF due to NICM HM2 LVAD , TV annuloplasty ring 17 as destination therapy due to severe peripheral artery disease with significant stenosis  SIADH, Depression, CKD-3 with hyperkalemia, past E. coli UTIs, driveline drainage (21) and COVID-19 (back in 2020)  He was recently seen in clinic where he complained of abdominal pain and dark stools w constipation back in May. He presents to [facility] ER today weakness and fatigue, moderate and + Black stools for three days, on coumadin secondary to warfarin use in the setting of an LVAD. Patient has required transfusions for GIB in the past. Mostly recently back in 2021 pt had anemia with dark stools. No interventions was done at that time. However Last Endoscopy was done in 2020 (negative). Today labs show patient is anemic with H/H of 4.5/16.3,. INR is 8.84 MAP in the 90s, Temp 35.1. He denies any chest pain, shortness of breath, dizziness, abd pain, nausea or vomiting. found to have  rectal bleeding underwent endoscopy ,old blood in the proximal ileum ,  develop sepsis with LL opacity given Antibiotics , Extubated , reintubated , Bronchoscopy on Zosyn for LL pneumonia  and Amiodarone S/P TV Annuloplasty , patient remain intubated on full ventilatory support .S/P multiple units of blood transfusion , remain on full ventilatory support on Precedex and propofol , new central IJ line , diarrhea C diff. +ve on po vancomycin and IV Flagyl,  mildly distended belly , fever start on cefepime 2gm q 8 hrs S/P tracheostomy .  new RT Subclavian central line continue on contact  isolation ,still diarrhea on C-diff antibiotics ENT follow up appreciated , trial of C-PAP as tolerated , , copious secretion from trach. site chest x ray left lower lobe pneumonia , tolerating trch. collar 50% FI02 still excessive secretion need pulmonary toilet , off Ancef antibiotic , no more diarrhea back on full support mechanical ventilator , chest x ray show improvement in LLL air space disease, more awake and responsive on tube feeding no more diarrhea , S/P  Ancef for bacteremia no fever ,ID follow up noted ,  no nausea or vomiting or diarrhea still very weak and tired , event note pulled NG tube now replaced , back on tube feeding ,still on po vancomycin , getting PT and OT at bed side , no plan for decannulation for now. , no more diarrhea receiving PT and OPT at bed side , minimal secretion from tracheostomy site , no SOB getting stronger , improve muscle tone patient transfer to monitor bed still on contact isolation for C-Difficel colitis on 50% FI02, NG tube clogged , and change to resume tube feeding still loose stool . H/H drop significantly require blood transfusion , most likely GI bleeding , IV heparin D/C , vomiting 200 cc of creamy color tube feeding on hold no sob, still melena monitor in the CTU possible capsule endoscopy , H/H is stable ., patient develop TR sided  pneumothorax require chest tube placement , RT IJ central line  placed , develop fever shaking chills , blood culture positive for serratia marcescens , start on cefepime .the patient  become hypoxic and hypotensive placed on full ventilatory support and Vasopressin , levophed and Dobutamine ,S/P blood transfusion on meropenem and vancomycin ,   , on and  off pressors , occasional agitation on Precedex .S/P IR cholecystostomy tube drainage placement in the RT upper Quadrant , resume anticoagulation chest x ray noted C-PAP trail lasted only for 2 hrs , new RT SC line and D/C RT IJ line , RT pig tail cathter has been removed , tolerating C-PAP trial placed on trach. collar 50% FI02 GI consultant noted on NG tube feeding as tolerated , develop AF RVR S/P  bolus Amiodarone  back to regular sinus Rhythm , Flat Affect depressed , back on tube feeding Vital AF at 60 cc/ hr .still intermittent abdominal pain , no fever saturation is accepted  back on full ventilatory support , tired and sleepy on round  .start  back on  tube feeding . tolerating it very well , no nausea or vomiting , good 02 saturation on trac-collar on methimazole for hyperthyroidism , no new C/O no plan for decannulation yet , electrolyte blood test is still pending .on respiratory isolation sputum culture is negative , increase WBC  improved on cefepime , sputum positive for enterococcus , condition is the same ,still C/O Abdominal pain , white count is improving , no chest pain or SOB , speech and swallow  evaluation appreciated , recommend continue NG tube feeding , Antibiotic is discontinue .intermittent vomiting , moderate trach. secretion .       (2021 16:57)    PAST MEDICAL & SURGICAL HISTORY:  CHF (congestive heart failure)    CAD (coronary artery disease)  Depression    Pleural effusion    History of 2019 novel coronavirus disease (COVID-19)  2020    Hemorrhoids    Bleeding hemorrhoids    Peripheral arterial disease    Claudication    BPH with urinary obstruction    ACC/AHA stage D systolic heart failure  Anticoagulation goal of INR 2.0 to 2.5    Falls    Clavicle fracture    CKD (chronic kidney disease), stage III    Iron deficiency anemia    H/O epistaxis    Vertigo    GI bleed    S/P TVR (tricuspid valve repair)    S/P ventricular assist device    S/P endoscopy    OBJECTIVE:  ICU Vital Signs Last 24 Hrs  T(C): 38.2 (2021 10:00), Max: 38.5 (2021 12:00)  T(F): 100.8 (2021 10:00), Max: 101.3 (2021 12:00)  HR: 65 (2021 10:00) (61 - 69)  BP: --  BP(mean): --  ABP: 105/67 (2021 10:00) (90/54 - 113/64)  ABP(mean): 77 (2021 10:00) (63 - 77)  RR: 20 (2021 10:00) (19 - 35)  SpO2: 99% (2021 10:00) (96% - 100%)       @ 07:  -   @ 07:00  --------------------------------------------------------  IN: 2693.9 mL / OUT: 1415 mL / NET: 1278.9 mL     @ 07:  -   @ 10:49  --------------------------------------------------------  IN: 420.8 mL / OUT: 115 mL / NET: 305.8 mL  PHYSICAL EXAM:Daily   Elderly male S/P tracheostomy on trach. collar 50% FI02 ,  Daily Weight in k.4 (2021 00:00)  HEENT:     + NCAT  + EOMI  - Conjuctival edema   - Icterus   - Thrush   - ETT  + NGT/OGT  Neck:         + FROM  RT IJ  line JVD  - Nodes - Masses + Mid-line trachea + Tracheostomy  Chest:            normal A-P diameter    Lungs:          + CTA   + Rhonchi    - Rales    - Wheezing + Decreased  LT BS   - Dullness R L  Cardiac:       + S1 + S2    + RRR   - Irregular   - S3  - S4    - Murmurs   - Rub   - Hamman’s sign   Abdomen:    + BS  + Soft + Non-tender - Distended - Organomegaly - PEG .cholecystostomy tube in place  Extremities:   - Cyanosis U/L   - Clubbing  U/L  + LE/UE Edema   + Capillary refill    + Pulses   Neuro:        - Awake   -  Alert   - Confused   - Lethargic   + Sedated  + Generalized Weakness  Skin:        - Rashes    - Erythema   + Normal incisions   + IV sites intact          HOSPITAL MEDICATIONS: All medications reviewed and analyzed  MEDICATIONS  (STANDING):  amiodarone    Tablet 200 milliGRAM(s) Oral daily  chlorhexidine 0.12% Liquid 15 milliLiter(s) Oral Mucosa every 12 hours  chlorhexidine 2% Cloths 1 Application(s) Topical <User Schedule>  dexmedetomidine Infusion 0.5 MICROgram(s)/kG/Hr (9.81 mL/Hr) IV Continuous <Continuous>  dextrose 50% Injectable 50 milliLiter(s) IV Push every 15 minutes  heparin  Infusion 400 Unit(s)/Hr (12.5 mL/Hr) IV Continuous <Continuous>  Hydromorphone  Injectable 0.5 milliGRAM(s) IV Push once  insulin lispro (ADMELOG) corrective regimen sliding scale   SubCutaneous every 6 hours  pantoprazole  Injectable 40 milliGRAM(s) IV Push every 12 hours  piperacillin/tazobactam IVPB.. 3.375 Gram(s) IV Intermittent every 8 hours  propofol Infusion 20 MICROgram(s)/kG/Min (9.42 mL/Hr) IV Continuous <Continuous>  sodium chloride 0.9% lock flush 3 milliLiter(s) IV Push every 8 hours  sodium chloride 0.9%. 1000 milliLiter(s) (10 mL/Hr) IV Continuous <Continuous>    MEDICATIONS  (PRN):  acetaminophen    Suspension .. 650 milliGRAM(s) Oral every 6 hours PRN Temp greater or equal to 38C (100.4F)    LABS: All Lab data reviewed and analyzed                        7.8    8.82  )-----------( 227      ( 05 Sep 2021 01:48 )             25.3        139  |  101  |  17  ----------------------------<  82  4.2   |  28  |  0.49<L>    Ca    10.1      05 Sep 2021 01:48  Phos  3.5     -  Mg     1.9         TPro  7.8  /  Alb  3.1<L>  /  TBili  0.4  /  DBili  x   /  AST  75<H>  /  ALT  179<H>  /  AlkPhos  289<H>      Ca    10.1      04 Sep 2021 00:40  Phos  3.0     -  Mg     1.8         TPro  8.0  /  Alb  3.1<L>  /  TBili  0.5  /  DBili  x   /  AST  79<H>  /  ALT  178<H>  /  AlkPhos  318<H>                 26.3   Ca    9.9      03 Sep 2021 00:30  Phos  2.9     -03  Mg     1.8         TPro  8.0  /  Alb  3.2<L>  /  TBili  0.5  /  DBili  x   /  AST  64<H>  /  ALT  149<H>  /  AlkPhos  311<H>                                                                                                                      PTT - ( 2021 04:52 )  PTT:45.2 sec LIVER FUNCTIONS - ( 2021 00:42 )  Alb: 3.4 g/dL / Pro: 6.7 g/dL / ALK PHOS: 213 U/L / ALT: 15 U/L / AST: 24 U/L / GGT: x           RADIOLOGY: - Reviewed and analyzed RT Pig tail cathter  , LVAD HM2, CT scan of abdomen reviewed result noted

## 2021-09-05 NOTE — PROGRESS NOTE ADULT - SUBJECTIVE AND OBJECTIVE BOX
RICKY HAMPTON  MRN-[id number]  Patient is a 66y old  Male who presents with a chief complaint of Anemia, Supratherapeutic INR, Dark Stools (04 Sep 2021 15:09)    HPI:  65M PMH ACC/AHA stage D HF due to NICM HM2 LVAD , TV annuloplasty ring 17 as destination therapy due to severe peripheral artery disease with significant stenosis  SIADH, Depression, CKD-3 with hyperkalemia, past E. coli UTIs, driveline drainage (21) and COVID-19 (back in 2020)  He was recently seen in clinic where he complained of abdominal pain and dark stools w constipation back in May. He presents to [facility] ER today weakness and fatigue, moderate and + Black stools for three days, on coumadin secondary to warfarin use in the setting of an LVAD. Patient has required transfusions for GIB in the past. Mostly recently back in 2021 pt had anemia with dark stools. No interventions was done at that time. However Last Endoscopy was done in 2020 (negative). Today labs show patient is anemic with H/H of 4.5/16.3,. INR is 8.84 MAP in the 90s, Temp 35.1. He denies any chest pain, shortness of breath, dizziness, abd pain, nausea or vomiting.     (2021 16:57)    Surgery/Hospital Course:   admit for melena w/ anemia, INR 8.84   6/15 Capsul study (+) for small bowel bleed, balloon endoscopy (old blood in prox ileum); post EGD - septic w/ L opacity, re-intubated for concern for aspiration, TTE (Mod MR, decrease biV w/ interventricular septum boweing towards R)   bronch    +C Diff    CT C/A/P: Fluid filled colon which may be 2/2 rapid transit. Small bilateral pleffs with associates. Compressive atelectasis New ISABELLE & LLL  parenchymal opacities, suspicious for pneumonia. 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    #8 Shiley trach at bedside    LVAD speed increased to 9200   Bronch   TC since . Patient transferred to SDU.    INR today 2.64.  H&H 7.3/24 this AM.  Will repeat CBC at noon, and will send stool guaiac Patient with persistent abdominal tenderness, rate of tube feeds decreased.  No nausea/vomiting.     INR today 2.4. H&H 9.1/28.6 low flow overnight /N&V, refusing Tube feeds on D5 1/2 normal  @50 cc/hr   INR 2.69  H&H 7.7/.1 refusing Tube feeds on D5 1/2 normal  @50 cc/hr. This am + BM Melena Dr Oneill HF  aware- PRBC x1  GI team consulted -  NPO  plan on study in am-  D/w Dr Cadet Patient  to return to CTU for further management; 1PRBCS    Post op INR 2.2 today.  No bleeding. BC + for SM.  Pt is hypotensive requiring pressor and inotropic support.  ID follow up today on Cefepime will follow.   R PTC for PTX    CT C/A/P: sub q emphysema in R chest wall, GGO RUL, small ascites CTH negative; Abd US: GB thickening, pericholecystic fluid     Perchole drain in place continues to drain total output overnight 133.  Fever today 38.8    duplex LE negative    Patient with persistent abdominal pain, refusing tube feeds and medications, Psych consulted   CTA A/P ordered to r/o mesenteric ischemia 2/2 persistent anorexia, nausea, vomiting. Revealed:  Evaluation of the mesenteric vessels is limited by streak artifact from LVAD. There appears to be severe stenosis of the proximal SMA; abdominal mesenteric doppler is recommended for further evaluation. 2.  No small bowel findings to suggest acute mesenteric ischemia. 3.  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  8/15: Cultures resulted BC 1/2 +GPC in clusters, SC enterobacter; mesenteric duplex: borderline stenosis of proximal SMA  : CT C/A/P noncon: Nondular opacities in R lung apex w/cavitation, abd nl  :  Continue current care, treatment of thyrotoxicosis with medications as per endocrine, d/c ABX as per team.     24 hour events:   ·	Has remained on continuous TC since , still with copious secretions, able to cough most up but still requires intermittent suctioning  ·	Cefepime d/c yesterday, WBC remain 10, normothermic  ·	No emesis overnight on tube feeds 35ml/hr, increased to 40ml    REVIEW OF SYSTEMS:   Patient speaks over trach   Gen: No fever, leg pain improved  EYES/ENT: No visual changes;  No vertigo or throat pain   NECK: No pain   RES:  No shortness of breath or Cough  CARD: No chest pain   GI: +abdominal pain, denies nausea at this time  : No dysuria  NEURO: No weakness; + lower extremity pain, improved today  SKIN: No itching, rashes     Physical Exam:  Gen:  Awake and alert, Sitting in chair in NAD.  Psych: Mood greatly improved but waxes and wanes, more interactive and participating in care. Would like to be more included in bedside rounding.  CNS:  intact, nonfocal, responds to all commands  Neck: no JVD, +TC   RES : course breath sounds, no wheezing, moderate tan secretions able to cough most up on own         CVS: +LVAD hum   Abd: Soft, RUQ tenderness by perc dawn site. Dawn drain with bilious fluid. Positive BS throughout.  Skin: No rash  Ext:  no edema    ICU Vital Signs Last 24 Hrs  T(C): 36.4 (05 Sep 2021 11:00), Max: 36.4 (05 Sep 2021 00:00)  T(F): 97.6 (05 Sep 2021 11:00), Max: 97.6 (05 Sep 2021 11:00)  HR: 89 (05 Sep 2021 12:15) (73 - 103)  BP(mean): 86  ABP: 92/74 (05 Sep 2021 12:15) (70/59 - 104/103)  ABP(mean): 83 (05 Sep 2021 12:15) (63 - 103)  RR: 19 (05 Sep 2021 12:15) (13 - 50)  SpO2: 100% (05 Sep 2021 12:15) (98% - 100%)    ============================I/O===========================  I&O's Detail    04 Sep 2021 07:01  -  05 Sep 2021 07:00  --------------------------------------------------------  IN:    Enteral Tube Flush: 200 mL    Miscellaneous Tube Feedin mL    multiple electrolytes Injection Type 1.: 720 mL  Total IN: 1755 mL    OUT:    Drain (mL): 400 mL    Voided (mL): 650 mL  Total OUT: 1050 mL    Total NET: 705 mL    05 Sep 2021 07:  -  05 Sep 2021 12:42  --------------------------------------------------------  IN:    Enteral Tube Flush: 60 mL    Miscellaneous Tube Feedin mL    multiple electrolytes Injection Type 1.: 150 mL  Total IN: 395 mL    OUT:                        7.8    8.82  )-----------( 227      ( 05 Sep 2021 01:48 )             25.3   Total OUT: 0 mL    Total NET: 395 mL    ============================ LABS =========================                         7.8    8.82  )-----------(       ( 05 Sep 2021 01:48 )             25.3     139  |  101  |  17  ----------------------------<  82  4.2   |  28  |  0.49<L>    Ca    10.1      05 Sep 2021 01:48  Phos  3.5       Mg     1.9     05    TPro  7.8  /  Alb  3.1<L>  /  TBili  0.4  /  DBili  x   /  AST  75<H>  /  ALT  179<H>  /  AlkPhos  289<H>      ======================Micro/Rad/Cardio=================  Culture: Reviewed   CXR: Reviewed  Echo: Reviewed    ======================================================  PAST MEDICAL & SURGICAL HISTORY:  CHF (congestive heart failure)    CAD (coronary artery disease)    Depression    Pleural effusion    History of  novel coronavirus disease (COVID-19)  2020    Hemorrhoids    Bleeding hemorrhoids    Peripheral arterial disease    Claudication    BPH with urinary obstruction    ACC/AHA stage D systolic heart failure    Anticoagulation goal of INR 2.0 to 2.5    Falls    Clavicle fracture    CKD (chronic kidney disease), stage III    Iron deficiency anemia    H/O epistaxis    Vertigo    GI bleed    S/P TVR (tricuspid valve repair)    S/P ventricular assist device    S/P endoscopy    ====================ASSESSMENT ==============  Stage D Nonischemic Cardiomyopathy, Status Post HM2 on 2017    Cardiogenic shock  Hemodynamic instability   Acute hypoxemic respiratory failure s/p trach   GI bleed , Status Post Enteroscopy   Anemia, in setting of melena   Chronic Kidney Disease  Stress hyperglycemia   C.diff positive on    Hypovolemic shock  Septic shock  Leukocytosis  GB thickening/percholecystic s/p perc choley by IT   SMA stenosis  Serratia/citrobacter pneumonia   Stenotrophomonas pneumonia   Enterobacter pneumonia   Nasuea/vomiting  Deconditioning    ====================== NEUROLOGY=====================  Nonfocal, continue to monitor neuro status   C/w mirtazapine and sertraline for depression  Deconditioned, PT/Ambulate as tolerated    mirtazapine 7.5 milliGRAM(s) Oral daily  sertraline 100 milliGRAM(s) Oral daily    ==================== RESPIRATORY======================  Acute hypoxemic respiratory failure s/p #8 shiley trach on , continue TC as tolerated (TC since )  Continue close monitoring of respiratory rate and breathing pattern, following of ABG’s with A-line monitoring, continuous pulse oximetry monitoring.   Moderate secretions, continue suctioning prn, pulmonary toileting. Will add mucomyst/duonebs/hypertonic saline with volara airway clearance device to add in secretion mobilization. Goal is to improve secretion management in order to allow for longer passymuir toleration for possible swallow study next week    acetylcysteine 20%  Inhalation 3 milliLiter(s) Inhalation every 6 hours  albuterol/ipratropium for Nebulization 3 milliLiter(s) Nebulizer every 6 hours  sodium chloride 0.9% for Nebulization 3 milliLiter(s) Nebulizer every 6 hours    ====================CARDIOVASCULAR==================  Stage D Nonischemic Cardiomyopathy, Status Post HM2 on 2017; LVAD settings 9200, flow 5  TTE : reveals at least moderate MR, mild AI, severe global LV syst dysfxn, dec RV fxn   Continue invasive hemodynamic monitoring   Propranolol for rate control of HR 2/2 Hyperthyroidism, titrate as tolerated per Endo    propranolol 40 milliGRAM(s) Oral every 6 hours    ===================HEMATOLOGIC/ONC ===================  Acute blood loss anemia, monitor H&H/Plts    Holding coumadin and aspirin indefinitely given recurrent GI bleeding. Continue to monitor LDH daily.  Continue Heparin for venous thromboembolism prophylaxis.     heparin   Injectable 5000 Unit(s) SubCutaneous every 8 hours    ===================== RENAL =========================  Continue to monitor I/Os, BUN/Creatinine, and urine output.   Euvolemic, Even fluid balance, currently off diuretics.  Replete lytes PRN. Keep K> 4 and Mg >2.     ==================== GASTROINTESTINAL===================  S/p cholecystostomy tube placed on  with IR   CTA A/P : Evaluation of the mesenteric vessels is limited by streak artifact from LVAD. There appears to be severe stenosis of the proximal SMA; abdominal mesenteric doppler is recommended for further evaluation. 2.  No small bowel findings to suggest acute mesenteric ischemia. 3.  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  Mesenteric duplex on 8/15 borderline stenosis of proximal SMA. ?Mesenteric angiogram with vascular this coming week.  CT Abd/Pel on  without acute process   Reglan for gut motility  Zofran PRN nausea/vomiting     Increase tube feeds by 5ml/day until goal rate of 65cc, currently on 40cc.   Per Gen surg not candidate for cholecystectomy 2/2 functioning perc choley drain, RUQ US pending to assess for GB decompression as pt still with nausea and intermittent emesis.  Heart failure to re-discuss/address SMA stenosis for possible intervention, f/u.   Goal is to improve secretion management in order to allow for longer passymuir toleration for possible swallow study next week in setting of patient not tolerating tube feeds consistently    multiple electrolytes Injection Type 1 1000 milliLiter(s) (30 mL/Hr) IV Continuous <Continuous>  multivitamin 1 Tablet(s) Oral daily  pantoprazole  Injectable 40 milliGRAM(s) IV Push every 12 hours  simethicone 80 milliGRAM(s) Chew every 8 hours PRN Gas  thiamine 100 milliGRAM(s) Oral daily    Continue pepcid for GI prophylaxis    =======================    ENDOCRINE  =====================  Stress hyperglycemia       - Continue glucose control with admelog sliding scale and NPH     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 Will initiate today.      - F/u thyroid studies and d/w endocrinology    hydrocortisone sodium succinate Injectable 50 milliGRAM(s) IV Push every 8 hours  insulin lispro (ADMELOG) corrective regimen sliding scale   SubCutaneous every 6 hours  insulin NPH human recombinant 12 Unit(s) SubCutaneous <User Schedule>  insulin NPH human recombinant 10 Unit(s) SubCutaneous <User Schedule>  methimazole 20 milliGRAM(s) Oral <User Schedule>    ========================INFECTIOUS DISEASE================  Afebrile, WBC currently 10.7  Continue trending WBC and monitoring fever curve   Culture from dawn drain on : NGTD  continue to monitor off antibiotics  ID following, MD Prater, appreciate recommendations    I have spent 30 minutes of noncontinuous critical care time with this patient.

## 2021-09-06 NOTE — PROGRESS NOTE ADULT - ASSESSMENT
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 coli UTIs, driveline drainage (1/7/21) and COVID-19, here initially for GIB prolonged hospital course, s/p tracheostomy, acalculous cholecystitis s/p perc dawn. Patient has persistent intermittent abdominal pain. Per CTU team, pt has recently been refusing tube feeds and is being evaluated for chronic mesenteric ischemia vs SMA syndrome.  8/13 bld cxs positive. Consulted for TPN. Prealb 11, prior a1c 5.6, tg 141. 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 Tolerating feeds but with intermittent abd pain. SLP eval recommending NPO status.    -Continue tube feeds- Vital 1.5 and advance to goal as tolerated (goal of 65 ml/hr) as per CTU team, no present plan to initiate TPN, will reassess need pending clinical course. Spoke with JESUS Carr at bedside who explained TFs going to 45cc/hr later today.  -? Uncontrolled hyperthyroidism contributing to metabolic issues ie. cAMP effects GI motility, hypercalcemia, tachycardia; further management as per Endocrine. Heart rate improved to 80s. Propanalol at doseage of 40mg q6.  -h/o abdominal pain/vomiting- as above, Zofran prn for nausea; Reglan for gut motility  -Hypothermia/leukocytosis- restarted abx, f/u cxs, care per ID  -Anemia- rec iron supp when feasible  -Psych, palliative care, and ethics input noted  -Management as per CTU; will remain available as needed. .Spoke with JESUS Carr at bedside.    plan discussed with Dr. Tang and Dr. Soliz, agreeable with above    TPN pager [phone], spectra [phone]  M-F 6A-4P, Weekends and holidays 8A-12P

## 2021-09-06 NOTE — PROGRESS NOTE ADULT - SUBJECTIVE AND OBJECTIVE BOX
RICKY JOINT  MRN#: [id number]  Subjective:  Pulmonary progress  : recurrent Acute hypoxic respiratory Failure ,aspiration pneumonia, NICM  , chart reviewed and H/O obtained radiological and Laboratory study reviewed patient Examined     65M PMH ACC/AHA stage D HF due to NICM HM2 LVAD , TV annuloplasty ring 17 as destination therapy due to severe peripheral artery disease with significant stenosis  SIADH, Depression, CKD-3 with hyperkalemia, past E. coli UTIs, driveline drainage (21) and COVID-19 (back in 2020)  He was recently seen in clinic where he complained of abdominal pain and dark stools w constipation back in May. He presents to [facility] ER today weakness and fatigue, moderate and + Black stools for three days, on coumadin secondary to warfarin use in the setting of an LVAD. Patient has required transfusions for GIB in the past. Mostly recently back in 2021 pt had anemia with dark stools. No interventions was done at that time. However Last Endoscopy was done in 2020 (negative). Today labs show patient is anemic with H/H of 4.5/16.3,. INR is 8.84 MAP in the 90s, Temp 35.1. He denies any chest pain, shortness of breath, dizziness, abd pain, nausea or vomiting. found to have  rectal bleeding underwent endoscopy ,old blood in the proximal ileum ,  develop sepsis with LL opacity given Antibiotics , Extubated , reintubated , Bronchoscopy on Zosyn for LL pneumonia  and Amiodarone S/P TV Annuloplasty , patient remain intubated on full ventilatory support .S/P multiple units of blood transfusion , remain on full ventilatory support on Precedex and propofol , new central IJ line , diarrhea C diff. +ve on po vancomycin and IV Flagyl,  mildly distended belly , fever start on cefepime 2gm q 8 hrs S/P tracheostomy .  new RT Subclavian central line continue on contact  isolation ,still diarrhea on C-diff antibiotics ENT follow up appreciated , trial of C-PAP as tolerated , , copious secretion from trach. site chest x ray left lower lobe pneumonia , tolerating trch. collar 50% FI02 still excessive secretion need pulmonary toilet , off Ancef antibiotic , no more diarrhea back on full support mechanical ventilator , chest x ray show improvement in LLL air space disease, more awake and responsive on tube feeding no more diarrhea , S/P  Ancef for bacteremia no fever ,ID follow up noted ,  no nausea or vomiting or diarrhea still very weak and tired , event note pulled NG tube now replaced , back on tube feeding ,still on po vancomycin , getting PT and OT at bed side , no plan for decannulation for now. , no more diarrhea receiving PT and OPT at bed side , minimal secretion from tracheostomy site , no SOB getting stronger , improve muscle tone patient transfer to monitor bed still on contact isolation for C-Difficel colitis on 50% FI02, NG tube clogged , and change to resume tube feeding still loose stool . H/H drop significantly require blood transfusion , most likely GI bleeding , IV heparin D/C , vomiting 200 cc of creamy color tube feeding on hold no sob, still melena monitor in the CTU possible capsule endoscopy , H/H is stable ., patient develop TR sided  pneumothorax require chest tube placement , RT IJ central line  placed , develop fever shaking chills , blood culture positive for serratia marcescens , start on cefepime .the patient  become hypoxic and hypotensive placed on full ventilatory support and Vasopressin , levophed and Dobutamine ,S/P blood transfusion on meropenem and vancomycin ,   , on and  off pressors , occasional agitation on Precedex .S/P IR cholecystostomy tube drainage placement in the RT upper Quadrant , resume anticoagulation chest x ray noted C-PAP trail lasted only for 2 hrs , new RT SC line and D/C RT IJ line , RT pig tail cathter has been removed , tolerating C-PAP trial placed on trach. collar 50% FI02 GI consultant noted on NG tube feeding as tolerated , develop AF RVR S/P  bolus Amiodarone  back to regular sinus Rhythm , Flat Affect depressed , back on tube feeding Vital AF at 60 cc/ hr .still intermittent abdominal pain , no fever saturation is accepted  back on full ventilatory support , tired and sleepy on round  .start  back on  tube feeding . tolerating it very well , no nausea or vomiting , good 02 saturation on trac-collar on methimazole for hyperthyroidism , no new C/O no plan for decannulation yet , electrolyte blood test is still pending .on respiratory isolation sputum culture is negative , increase WBC  improved on cefepime , sputum positive for enterococcus , condition is the same ,still C/O Abdominal pain , white count is improving , no chest pain or SOB , speech and swallow  evaluation appreciated , recommend continue NG tube feeding , Antibiotic is discontinue .intermittent vomiting , moderate trach. secretion .placed on Reglan 10 mg TID for gastric motility        (2021 16:57)    PAST MEDICAL & SURGICAL HISTORY:  CHF (congestive heart failure)    CAD (coronary artery disease)  Depression    Pleural effusion    History of 2019 novel coronavirus disease (COVID-19)  2020    Hemorrhoids    Bleeding hemorrhoids    Peripheral arterial disease    Claudication    BPH with urinary obstruction    ACC/AHA stage D systolic heart failure  Anticoagulation goal of INR 2.0 to 2.5    Falls    Clavicle fracture    CKD (chronic kidney disease), stage III    Iron deficiency anemia    H/O epistaxis    Vertigo    GI bleed    S/P TVR (tricuspid valve repair)    S/P ventricular assist device    S/P endoscopy    OBJECTIVE:  ICU Vital Signs Last 24 Hrs  T(C): 38.2 (2021 10:00), Max: 38.5 (2021 12:00)  T(F): 100.8 (2021 10:00), Max: 101.3 (2021 12:00)  HR: 65 (2021 10:00) (61 - 69)  BP: --  BP(mean): --  ABP: 105/67 (2021 10:00) (90/54 - 113/64)  ABP(mean): 77 (2021 10:00) (63 - 77)  RR: 20 (2021 10:00) (19 - 35)  SpO2: 99% (2021 10:00) (96% - 100%)       @ :  -   @ 07:00  --------------------------------------------------------  IN: 2693.9 mL / OUT: 1415 mL / NET: 1278.9 mL     @ 07:01  -   @ 10:49  --------------------------------------------------------  IN: 420.8 mL / OUT: 115 mL / NET: 305.8 mL  PHYSICAL EXAM:Daily   Elderly male S/P tracheostomy on trach. collar 50% FI02 ,  Daily Weight in k.4 (2021 00:00)  HEENT:     + NCAT  + EOMI  - Conjuctival edema   - Icterus   - Thrush   - ETT  + NGT/OGT  Neck:         + FROM  RT IJ  line JVD  - Nodes - Masses + Mid-line trachea + Tracheostomy  Chest:            normal A-P diameter    Lungs:          + CTA   + Rhonchi    - Rales    - Wheezing + Decreased  LT BS   - Dullness R L  Cardiac:       + S1 + S2    + RRR   - Irregular   - S3  - S4    - Murmurs   - Rub   - Hamman’s sign   Abdomen:    + BS  + Soft + Non-tender - Distended - Organomegaly - PEG .cholecystostomy tube in place  Extremities:   - Cyanosis U/L   - Clubbing  U/L  + LE/UE Edema   + Capillary refill    + Pulses   Neuro:        - Awake   -  Alert   - Confused   - Lethargic   + Sedated  + Generalized Weakness  Skin:        - Rashes    - Erythema   + Normal incisions   + IV sites intact          HOSPITAL MEDICATIONS: All medications reviewed and analyzed  MEDICATIONS  (STANDING):  amiodarone    Tablet 200 milliGRAM(s) Oral daily  chlorhexidine 0.12% Liquid 15 milliLiter(s) Oral Mucosa every 12 hours  chlorhexidine 2% Cloths 1 Application(s) Topical <User Schedule>  dexmedetomidine Infusion 0.5 MICROgram(s)/kG/Hr (9.81 mL/Hr) IV Continuous <Continuous>  dextrose 50% Injectable 50 milliLiter(s) IV Push every 15 minutes  heparin  Infusion 400 Unit(s)/Hr (12.5 mL/Hr) IV Continuous <Continuous>  Hydromorphone  Injectable 0.5 milliGRAM(s) IV Push once  insulin lispro (ADMELOG) corrective regimen sliding scale   SubCutaneous every 6 hours  pantoprazole  Injectable 40 milliGRAM(s) IV Push every 12 hours  piperacillin/tazobactam IVPB.. 3.375 Gram(s) IV Intermittent every 8 hours  propofol Infusion 20 MICROgram(s)/kG/Min (9.42 mL/Hr) IV Continuous <Continuous>  sodium chloride 0.9% lock flush 3 milliLiter(s) IV Push every 8 hours  sodium chloride 0.9%. 1000 milliLiter(s) (10 mL/Hr) IV Continuous <Continuous>    MEDICATIONS  (PRN):  acetaminophen    Suspension .. 650 milliGRAM(s) Oral every 6 hours PRN Temp greater or equal to 38C (100.4F)    LABS: All Lab data reviewed and analyzed                        7.7    7.56  )-----------( 235      ( 06 Sep 2021 03:47 )             24.9        136  |  99  |  17  ----------------------------<  113<H>  3.9   |  26  |  0.47<L>    Ca    9.7      06 Sep 2021 00:25  Phos  2.9     09-06  Mg     1.8         TPro  7.4  /  Alb  3.0<L>  /  TBili  0.4  /  DBili  x   /  AST  86<H>  /  ALT  187<H>  /  AlkPhos  282<H>      Ca    10.1      05 Sep 2021 01:48  Phos  3.5     -  Mg     1.9         TPro  7.8  /  Alb  3.1<L>  /  TBili  0.4  /  DBili  x   /  AST  75<H>  /  ALT  179<H>  /  AlkPhos  289<H>      Ca    10.1      04 Sep 2021 00:40  Phos  3.0     09-04  Mg     1.8         TPro  8.0  /  Alb  3.1<L>  /  TBili  0.5  /  DBili  x   /  AST  79<H>  /  ALT  178<H>  /  AlkPhos  318<H>                                                                                                                        PTT - ( 2021 04:52 )  PTT:45.2 sec LIVER FUNCTIONS - ( 2021 00:42 )  Alb: 3.4 g/dL / Pro: 6.7 g/dL / ALK PHOS: 213 U/L / ALT: 15 U/L / AST: 24 U/L / GGT: x           RADIOLOGY: - Reviewed and analyzed RT Pig tail cathter  , LVAD HM2, CT scan of abdomen reviewed result noted

## 2021-09-06 NOTE — PROGRESS NOTE ADULT - SUBJECTIVE AND OBJECTIVE BOX
[facility] NUTRITION SUPPORT--  Attending/ PA FOLLOW UP NOTE      24 hour events/subjective:      TPN originally consulted for nutrition support on 8/15.  Pt however is tolerating TFs now at 40cc/hr, and is not requiring TPN for any additional nutritional support at this time.            PAST HISTORY  --------------------------------------------------------------------------------  No significant changes to PMH, PSH, FHx, SHx, unless otherwise noted    ALLERGIES & MEDICATIONS  --------------------------------------------------------------------------------  Allergies    Amiodarone Hydrochloride (Other (Severe))    Intolerances      Standing Inpatient Medications  cholestyramine Powder (Sugar-Free) 4 Gram(s) Oral every 12 hours  dextrose 40% Gel 15 Gram(s) Oral once  dextrose 50% Injectable 25 Gram(s) IV Push once  dextrose 50% Injectable 12.5 Gram(s) IV Push once  glucagon  Injectable 1 milliGRAM(s) IntraMuscular once  heparin   Injectable 5000 Unit(s) SubCutaneous every 8 hours  hydrocortisone sodium succinate Injectable 50 milliGRAM(s) IV Push every 8 hours  insulin lispro (ADMELOG) corrective regimen sliding scale   SubCutaneous every 6 hours  insulin NPH human recombinant 6 Unit(s) SubCutaneous <User Schedule>  methimazole 20 milliGRAM(s) Oral <User Schedule>  metoclopramide Injectable 10 milliGRAM(s) IV Push every 8 hours  mirtazapine 7.5 milliGRAM(s) Oral daily  multiple electrolytes Injection Type 1 1000 milliLiter(s) IV Continuous <Continuous>  multivitamin 1 Tablet(s) Oral daily  pantoprazole  Injectable 40 milliGRAM(s) IV Push every 12 hours  propranolol 40 milliGRAM(s) Oral every 6 hours  sertraline 100 milliGRAM(s) Oral daily  thiamine 100 milliGRAM(s) Oral daily    PRN Inpatient Medications  ondansetron Injectable 4 milliGRAM(s) IV Push every 6 hours PRN  simethicone 80 milliGRAM(s) Chew every 8 hours PRN      REVIEW OF SYSTEMS  --------------------------------------------------------------------------------  Gen: as per HPI  Skin: No rashes  Head/Eyes/Ears/Mouth: No headache;No sore throat  Respiratory: No dyspnea, cough,   CV: No chest pain, PND, orthopnea  GI: as per HPI  : No increased frequency, dysuria, hematuria, nocturia  MSK: No joint pain/swelling; no back pain; no edema  Neuro: No dizziness/lightheadedness, weakness, seizures, numbness, tingling  Psych: No significant nervousness, anxiety, stress, depression    All other systems were reviewed and are negative, except as noted.      LABS/STUDIES  --------------------------------------------------------------------------------              7.7    7.56  >-----------<  235      [09-06-21 @ 03:47]              24.9     136  |  99  |  17  ----------------------------<  113      [09-06-21 @ 00:25]  3.9   |  26  |  0.47        Ca     9.7     [09-06-21 @ 00:25]      Mg     1.8     [09-06-21 @ 00:25]      Phos  2.9     [09-06-21 @ 00:25]    TPro  7.4  /  Alb  3.0  /  TBili  0.4  /  DBili  x   /  AST  86  /  ALT  187  /  AlkPhos  282  [09-06-21 @ 00:25]              [09-06-21 @ 00:25]    Blood Gas Arterial - Calcium, Ionized: 1.35 mmol/L (09-06-21 @ 00:04)  Blood Gas Arterial - Calcium, Ionized: 1.32 mmol/L (09-05-21 @ 01:47)    Glucose, Serum: 113 mg/dL (09-06-21 @ 00:25)    Prealbumin, Serum: 11 mg/dL (08-16-21 @ 04:01)  Prealbumin, Serum: 10 mg/dL (08-15-21 @ 13:53)          09-05-21 @ 07:01  -  09-06-21 @ 07:00  --------------------------------------------------------  IN: 1845 mL / OUT: 806 mL / NET: 1039 mL    09-06-21 @ 07:01  -  09-06-21 @ 14:13  --------------------------------------------------------  IN: 655 mL / OUT: 500 mL / NET: 155 mL        VITALS/PHYSICAL EXAM  --------------------------------------------------------------------------------  T(C): 36.2 (09-06-21 @ 12:00), Max: 36.2 (09-06-21 @ 00:00)  HR: 88 (09-06-21 @ 14:00) (71 - 88)  BP: --  RR: 29 (09-06-21 @ 14:00) (13 - 45)  SpO2: 100% (09-06-21 @ 14:00) (98% - 100%)  Wt(kg): --      Physical Exam:    Gen: laying in bed +NGT   HEENT: NCAT PERRL  Neck: no noted JVD, trach   Chest: respirations non labored  Abd: soft  ND +Perc c-tube  LE: no edema  Neuro: awake alert appropriate    .  .  .

## 2021-09-06 NOTE — PROGRESS NOTE ADULT - ASSESSMENT
Assessment and Recommendation:   · Assessment	  Assessment and recommendation :  Recurrent Acute hypoxic respiratory Failure S/P tracheostomy on trach. collar  50% FI02,   Acute blood loss anemia S/P multiple  blood transfusion   S/P septic shock off vasopressin , levophed and off  Dobutamine   S/P cholecystostomy tube placement by IR    AF RVR back to regular sinus Rhythm   Non ischemic cardiomyopathy continue ACE inhibitor and B-Blockers   S/P Septic shock and cardiogenic shock   Stage D systolic heart failure S/P LVAD HM2   MH2 LVAD  with  TV Annuloplasty  Severe peripheral vascular disease   severe hyperglycemia on insulin coverage    Reglan 10 mg for Gastric Motility   hyperthyroidism on Methimazole 10mg TID   critical care polyneuropathy   Anemia of Acute blood Loss   severe protein caloric malnutrition   S/P blood and FFP transfusion   Chronic kidney disease stage III  failed speech  and swallow evaluation   resume  NGT feeding .tolerating it well   Decannulation ?  GI prophylaxis with PPI

## 2021-09-06 NOTE — PROGRESS NOTE ADULT - SUBJECTIVE AND OBJECTIVE BOX
RICKY HAMPTON  MRN-[id number]  Patient is a 66y old  Male who presents with a chief complaint of Anemia, Supratherapeutic INR, Dark Stools (05 Sep 2021 14:09)    HPI:  65M PMH ACC/AHA stage D HF due to NICM HM2 LVAD , TV annuloplasty ring 17 as destination therapy due to severe peripheral artery disease with significant stenosis  SIADH, Depression, CKD-3 with hyperkalemia, past E. coli UTIs, driveline drainage (21) and COVID-19 (back in 2020)  He was recently seen in clinic where he complained of abdominal pain and dark stools w constipation back in May. He presents to [facility] ER today weakness and fatigue, moderate and + Black stools for three days, on coumadin secondary to warfarin use in the setting of an LVAD. Patient has required transfusions for GIB in the past. Mostly recently back in 2021 pt had anemia with dark stools. No interventions was done at that time. However Last Endoscopy was done in 2020 (negative). Today labs show patient is anemic with H/H of 4.5/16.3,. INR is 8.84 MAP in the 90s, Temp 35.1. He denies any chest pain, shortness of breath, dizziness, abd pain, nausea or vomiting.       (2021 16:57)      Surgery/Hospital Course:   admit for melena w/ anemia, INR 8.84   6/15 Capsul study (+) for small bowel bleed, balloon endoscopy (old blood in prox ileum); post EGD - septic w/ L opacity, re-intubated for concern for aspiration, TTE (Mod MR, decrease biV w/ interventricular septum boweing towards R)   bronch    +C Diff    CT C/A/P: Fluid filled colon which may be 2/2 rapid transit. Small bilateral pleffs with associates. Compressive atelectasis New ISABELLE & LLL  parenchymal opacities, suspicious for pneumonia. 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    #8 Shiley trach at bedside    LVAD speed increased to 9200   Bronch   TC since . Patient transferred to SDU.    INR today 2.64.  H&H 7.3/24 this AM.  Will repeat CBC at noon, and will send stool guaiac Patient with persistent abdominal tenderness, rate of tube feeds decreased.  No nausea/vomiting.     INR today 2.4. H&H 9.1/28.6 low flow overnight /N&V, refusing Tube feeds on D5 1/2 normal  @50 cc/hr   INR 2.69  H&H 7.7/.1 refusing Tube feeds on D5 1/2 normal  @50 cc/hr. This am + BM Melena Dr Oneill HF  aware- PRBC x1  GI team consulted -  NPO  plan on study in am-  D/w Dr Cadet Patient  to return to CTU for further management; 1PRBCS    Post op INR 2.2 today.  No bleeding. BC + for SM.  Pt is hypotensive requiring pressor and inotropic support.  ID follow up today on Cefepime will follow.   R PTC for PTX    CT C/A/P: sub q emphysema in R chest wall, GGO RUL, small ascites CTH negative; Abd US: GB thickening, pericholecystic fluid     Perchole drain in place continues to drain total output overnight 133.  Fever today 38.8    duplex LE negative    Patient with persistent abdominal pain, refusing tube feeds and medications, Psych consulted   CTA A/P ordered to r/o mesenteric ischemia 2/2 persistent anorexia, nausea, vomiting. Revealed:  Evaluation of the mesenteric vessels is limited by streak artifact from LVAD. There appears to be severe stenosis of the proximal SMA; abdominal mesenteric doppler is recommended for further evaluation. 2.  No small bowel findings to suggest acute mesenteric ischemia. 3.  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  8/15: Cultures resulted BC 1/2 +GPC in clusters, SC enterobacter; mesenteric duplex: borderline stenosis of proximal SMA  : CT C/A/P noncon: Nondular opacities in R lung apex w/cavitation, abd nl  :  Continue current care, treatment of thyrotoxicosis with medications as per endocrine, d/c ABX as per team.     Today:   Vital 1.5 shantelle feeds - Continue increasing tube feeds 5cc/day for goal of 65cc/hr as tolerated. TC since , continue as tolerated. Ambulate as tolerated.     REVIEW OF SYSTEMS:  Patient speaks over trach   Gen: No fever, leg pain improved  EYES/ENT: No visual changes;  No vertigo or throat pain   NECK: No pain   RES:  No shortness of breath or Cough  CARD: No chest pain   GI: +abdominal pain, denies nausea at this time  : No dysuria  NEURO: No weakness; + lower extremity pain, improved today  SKIN: No itching, rashes     ICU Vital Signs Last 24 Hrs  T(C): 36.2 (06 Sep 2021 12:00), Max: 36.2 (06 Sep 2021 00:00)  T(F): 97.1 (06 Sep 2021 12:00), Max: 97.2 (06 Sep 2021 04:00)  HR: 79 (06 Sep 2021 12:00) (71 - 92)  BP: --  BP(mean): --  ABP: 83/66 (06 Sep 2021 12:00) (67/59 - 92/75)  ABP(mean): 73 (06 Sep 2021 12:00) (62 - 82)  RR: 23 (06 Sep 2021 12:00) (10 - 45)  SpO2: 100% (06 Sep 2021 12:00) (98% - 100%)      Physical Exam:  Gen:  Awake and alert, Sitting in chair in NAD.  Psych: Mood greatly improved but waxes and wanes, more interactive and participating in care. Would like to be more included in bedside rounding.  CNS:  intact, nonfocal, responds to all commands  Neck: no JVD, +TC   RES : course breath sounds, no wheezing, moderate tan secretions able to cough most up on own         CVS: +LVAD hum   Abd: Soft, RUQ tenderness by perc sybil site. Sybil drain with bilious fluid. Positive BS throughout.  Skin: No rash  Ext:  no edema    ============================I/O===========================   I&O's Detail    05 Sep 2021 07:01  -  06 Sep 2021 07:00  --------------------------------------------------------  IN:    Enteral Tube Flush: 180 mL    Miscellaneous Tube Feedin mL    multiple electrolytes Injection Type 1.: 720 mL  Total IN: 1845 mL    OUT:    Drain (mL): 380 mL    Stool (mL): 1 mL    Voided (mL): 425 mL  Total OUT: 806 mL    Total NET: 1039 mL      06 Sep 2021 07:01  -  06 Sep 2021 12:28  --------------------------------------------------------  IN:    Enteral Tube Flush: 70 mL    Miscellaneous Tube Feedin mL    multiple electrolytes Injection Type 1.: 180 mL  Total IN: 505 mL    OUT:    Voided (mL): 175 mL  Total OUT: 175 mL    Total NET: 330 mL        ============================ LABS =========================                        7.7    7.56  )-----------( 235      ( 06 Sep 2021 03:47 )             24.9         136  |  99  |  17  ----------------------------<  113<H>  3.9   |  26  |  0.47<L>    Ca    9.7      06 Sep 2021 00:25  Phos  2.9       Mg     1.8         TPro  7.4  /  Alb  3.0<L>  /  TBili  0.4  /  DBili  x   /  AST  86<H>  /  ALT  187<H>  /  AlkPhos  282<H>      LIVER FUNCTIONS - ( 06 Sep 2021 00:25 )  Alb: 3.0 g/dL / Pro: 7.4 g/dL / ALK PHOS: 282 U/L / ALT: 187 U/L / AST: 86 U/L / GGT: x             ABG - ( 06 Sep 2021 00:04 )  pH, Arterial: 7.40  pH, Blood: x     /  pCO2: 53    /  pO2: 158   / HCO3: 33    / Base Excess: 7.1   /  SaO2: 100.0               ======================Micro/Rad/Cardio=================  Culture: Reviewed   CXR: Reviewed  Echo:Reviewed  ======================================================  PAST MEDICAL & SURGICAL HISTORY:  CHF (congestive heart failure)    CAD (coronary artery disease)    Depression    Pleural effusion    History of 2019 novel coronavirus disease (COVID-19)  2020    Hemorrhoids    Bleeding hemorrhoids    Peripheral arterial disease    Claudication    BPH with urinary obstruction    ACC/AHA stage D systolic heart failure    Anticoagulation goal of INR 2.0 to 2.5    Falls    Clavicle fracture    CKD (chronic kidney disease), stage III    Iron deficiency anemia    H/O epistaxis    Vertigo    GI bleed    S/P TVR (tricuspid valve repair)    S/P ventricular assist device    S/P endoscopy      ====================ASSESSMENT ==============  67 YO M with a history of stage D NICM s/p HM2 on 2017 as DT (due to severe PAD) with TV ring, prior COVID-19 infection 2020, recurrent syncope post LVAD s/p ILR, and chronic abdominal pain with prior negative workup who was admitted with symptomatic anemia with Hgb 4.5 in setting of INR 8.8. Hospital course c/b cardiogenic shock, hemodynamic instability, acute hypoxemic respiratory failure s/p tracheostomy, GI bleed S/P Enteroscopy , Anemia, in setting of melena, chronic kidney disease, stress hyperglycemia, C.diff positive on , hypovolemic shock, septic shock, Enterobacter pneumonia , GB thickening/percholecystic s/p perc choley by IT , SMA stenosis    Plan:  ====================== NEUROLOGY=====================  Nonfocal, continue to monitor neuro status   Deconditioned, PT/Ambulate as tolerated    For depression,   mirtazapine 7.5 milliGRAM(s) Oral daily  sertraline 100 milliGRAM(s) Oral daily    ==================== RESPIRATORY======================  Acute hypoxemic respiratory failure s/p #8 shiley trach on , continue TC as tolerated (TC since )  Continue close monitoring of respiratory rate and breathing pattern, following of ABG’s with A-line monitoring, continuous pulse oximetry monitoring.   Moderate secretions, continue suctioning prn, pulmonary toileting.     ====================CARDIOVASCULAR==================  Stage D Nonischemic Cardiomyopathy, Status Post HM2 on 2017; LVAD settings 9200, flow 5.7  TTE : reveals at least moderate MR, mild AI, severe global LV syst dysfxn, dec RV fxn   Continue invasive hemodynamic monitoring     For rate control of HR 2/2 Hyperthyroidism, titrate as tolerated per Endo  propranolol 40 milliGRAM(s) Oral every 6 hours    ===================HEMATOLOGIC/ONC ===================  Acute blood loss anemia, monitor H&H/Plts    Holding coumadin and aspirin indefinitely given recurrent GI bleeding. Continue to monitor LDH daily.    VTE prophylaxis,   heparin   Injectable 5000 Unit(s) SubCutaneous every 8 hours    ===================== RENAL =========================  Continue to monitor I/Os, BUN/Creatinine, and urine output.   Euvolemic, Even fluid balance, currently off diuretics.    Replete lytes PRN. Keep K> 4 and Mg >2.     ==================== GASTROINTESTINAL===================  S/p cholecystostomy tube placed on  with IR   CTA A/P : Evaluation of the mesenteric vessels is limited by streak artifact from LVAD. There appears to be severe stenosis of the proximal SMA; abdominal mesenteric doppler is recommended for further evaluation. 2.  No small bowel findings to suggest acute mesenteric ischemia. 3.  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  Mesenteric duplex on 8/15 borderline stenosis of proximal SMA. ?Mesenteric angiogram with vascular this coming week.  Vital 1.5 shantelle feeds - Continue increasing tube feeds 5cc/day for goal of 65cc/hr as tolerated.   CT Abd/Pel on  without acute process   Reglan for gut motility  Zofran PRN nausea/vomiting     GI prophylaxis,   pantoprazole  Injectable 40 milliGRAM(s) IV Push every 12 hours    For gut motility   metoclopramide Injectable 10 milliGRAM(s) IV Push every 8 hours    PRN nausea/vomiting   ondansetron Injectable 4 milliGRAM(s) IV Push every 6 hours PRN Nausea and/or Vomiting    PRN gas   simethicone 80 milliGRAM(s) Chew every 8 hours PRN Gas    thiamine 100 milliGRAM(s) Oral daily   multiple electrolytes Injection Type 1 1000 mill iLiter(s) (30 mL/Hr) IV Continuous <Continuous>  multivitamin 1 Tablet(s) Oral daily    =======================    ENDOCRINE  =====================  Stress hyperglycemia, continue glucose control with   insulin lispro (ADMELOG) corrective regimen sliding scale   SubCutaneous every 6 hours  insulin NPH human recombinant 6 Unit(s) SubCutaneous <User Schedule>  glucagon  Injectable 1 milliGRAM(s) IntraMuscular once      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 Will initiate today.      - F/u thyroid studies and d/w endocrinology      - Management with   hydrocortisone sodium succinate Injectable 50 milliGRAM(s) IV Push every 8 hours  methimazole 20 milliGRAM(s) Oral <User Schedule>    cholestyramine Powder (Sugar-Free) 4 Gram(s) Oral every 12 hours  dextrose 40% Gel 15 Gram(s) Oral once  dextrose 50% Injectable 25 Gram(s) IV Push once  dextrose 50% Injectable 12.5 Gram(s) IV Push once    ========================INFECTIOUS DISEASE================  Afebrile, WBC within normal limits   Continue trending WBC and monitoring fever curve   Culture from sybil drain on : NGTD  ID following, MD Praetr, appreciate recommendations      Patient requires continuous monitoring with bedside rhythm monitoring, pulse ox monitoring, and intermittent blood gas analysis. Care plan discussed with ICU care team. Patient remained critical and at risk for life threatening decompensation.     By signing my name below, IAgnieszka, attest that this documentation has been prepared under the direction and in the presence of CLAUDIA Gale   Electronically signed: Romel Shaffer, 21 @ 12:28    I, Aleksandra Olivas, personally performed the services described in this documentation. all medical record entries made by the romel were at my direction and in my presence. I have reviewed the chart and agree that the record reflects my personal performance and is accurate and complete  Electronically signed: CLAUDIA Gale        RICKY HAMPTON  MRN-[id number]  Patient is a 66y old  Male who presents with a chief complaint of Anemia, Supratherapeutic INR, Dark Stools (05 Sep 2021 14:09)    HPI:  65M PMH ACC/AHA stage D HF due to NICM HM2 LVAD , TV annuloplasty ring 17 as destination therapy due to severe peripheral artery disease with significant stenosis  SIADH, Depression, CKD-3 with hyperkalemia, past E. coli UTIs, driveline drainage (21) and COVID-19 (back in 2020)  He was recently seen in clinic where he complained of abdominal pain and dark stools w constipation back in May. He presents to [facility] ER today weakness and fatigue, moderate and + Black stools for three days, on coumadin secondary to warfarin use in the setting of an LVAD. Patient has required transfusions for GIB in the past. Mostly recently back in 2021 pt had anemia with dark stools. No interventions was done at that time. However Last Endoscopy was done in 2020 (negative). Today labs show patient is anemic with H/H of 4.5/16.3,. INR is 8.84 MAP in the 90s, Temp 35.1. He denies any chest pain, shortness of breath, dizziness, abd pain, nausea or vomiting.       (2021 16:57)      Surgery/Hospital Course:   admit for melena w/ anemia, INR 8.84   6/15 Capsul study (+) for small bowel bleed, balloon endoscopy (old blood in prox ileum); post EGD - septic w/ L opacity, re-intubated for concern for aspiration, TTE (Mod MR, decrease biV w/ interventricular septum boweing towards R)   bronch    +C Diff    CT C/A/P: Fluid filled colon which may be 2/2 rapid transit. Small bilateral pleffs with associates. Compressive atelectasis New ISABELLE & LLL  parenchymal opacities, suspicious for pneumonia. 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    #8 Shiley trach at bedside    LVAD speed increased to 9200   Bronch   TC since . Patient transferred to SDU.    INR today 2.64.  H&H 7.3/24 this AM.  Will repeat CBC at noon, and will send stool guaiac Patient with persistent abdominal tenderness, rate of tube feeds decreased.  No nausea/vomiting.     INR today 2.4. H&H 9.1/28.6 low flow overnight /N&V, refusing Tube feeds on D5 1/2 normal  @50 cc/hr   INR 2.69  H&H 7.7/.1 refusing Tube feeds on D5 1/2 normal  @50 cc/hr. This am + BM Melena Dr Oneill HF  aware- PRBC x1  GI team consulted -  NPO  plan on study in am-  D/w Dr Cadet Patient  to return to CTU for further management; 1PRBCS    Post op INR 2.2 today.  No bleeding. BC + for SM.  Pt is hypotensive requiring pressor and inotropic support.  ID follow up today on Cefepime will follow.   R PTC for PTX    CT C/A/P: sub q emphysema in R chest wall, GGO RUL, small ascites CTH negative; Abd US: GB thickening, pericholecystic fluid     Perchole drain in place continues to drain total output overnight 133.  Fever today 38.8    duplex LE negative    Patient with persistent abdominal pain, refusing tube feeds and medications, Psych consulted   CTA A/P ordered to r/o mesenteric ischemia 2/2 persistent anorexia, nausea, vomiting. Revealed:  Evaluation of the mesenteric vessels is limited by streak artifact from LVAD. There appears to be severe stenosis of the proximal SMA; abdominal mesenteric doppler is recommended for further evaluation. 2.  No small bowel findings to suggest acute mesenteric ischemia. 3.  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  8/15: Cultures resulted BC 1/2 +GPC in clusters, SC enterobacter; mesenteric duplex: borderline stenosis of proximal SMA  : CT C/A/P noncon: Nondular opacities in R lung apex w/cavitation, abd nl  :  Continue current care, treatment of thyrotoxicosis with medications as per endocrine, d/c ABX as per team.     Today:   Vital 1.5 shantelle feeds - Continue increasing tube feeds 5cc/day for goal of 65cc/hr as tolerated. For repeat JASMIN US to reassess gallbladder with perc sybil tube. TC since , continue as tolerated. Ambulate as tolerated.     REVIEW OF SYSTEMS:  Patient speaks over trach   Gen: No fever, leg pain improved  EYES/ENT: No visual changes;  No vertigo or throat pain   NECK: No pain   RES:  No shortness of breath or Cough  CARD: No chest pain   GI: +abdominal pain, denies nausea at this time  : No dysuria  NEURO: No weakness; + lower extremity pain, improved today  SKIN: No itching, rashes     ICU Vital Signs Last 24 Hrs  T(C): 36.2 (06 Sep 2021 12:00), Max: 36.2 (06 Sep 2021 00:00)  T(F): 97.1 (06 Sep 2021 12:00), Max: 97.2 (06 Sep 2021 04:00)  HR: 79 (06 Sep 2021 12:00) (71 - 92)  BP: --  BP(mean): --  ABP: 83/66 (06 Sep 2021 12:00) (67/59 - 92/75)  ABP(mean): 73 (06 Sep 2021 12:00) (62 - 82)  RR: 23 (06 Sep 2021 12:00) (10 - 45)  SpO2: 100% (06 Sep 2021 12:00) (98% - 100%)      Physical Exam:  Gen:  Awake and alert, Sitting in chair in NAD.  Psych: Mood greatly improved but waxes and wanes, more interactive and participating in care. Would like to be more included in bedside rounding.  CNS:  intact, nonfocal, responds to all commands  Neck: no JVD, +TC   RES : course breath sounds, no wheezing, moderate tan secretions able to cough most up on own         CVS: +LVAD hum   Abd: Soft, RUQ tenderness by perc sybil site. Sybil drain with bilious fluid. Positive BS throughout.  Skin: No rash  Ext:  no edema    ============================I/O===========================   I&O's Detail    05 Sep 2021 07:01  -  06 Sep 2021 07:00  --------------------------------------------------------  IN:    Enteral Tube Flush: 180 mL    Miscellaneous Tube Feedin mL    multiple electrolytes Injection Type 1.: 720 mL  Total IN: 1845 mL    OUT:    Drain (mL): 380 mL    Stool (mL): 1 mL    Voided (mL): 425 mL  Total OUT: 806 mL    Total NET: 1039 mL      06 Sep 2021 07:01  -  06 Sep 2021 12:28  --------------------------------------------------------  IN:    Enteral Tube Flush: 70 mL    Miscellaneous Tube Feedin mL    multiple electrolytes Injection Type 1.: 180 mL  Total IN: 505 mL    OUT:    Voided (mL): 175 mL  Total OUT: 175 mL    Total NET: 330 mL        ============================ LABS =========================                        7.7    7.56  )-----------( 235      ( 06 Sep 2021 03:47 )             24.9         136  |  99  |  17  ----------------------------<  113<H>  3.9   |  26  |  0.47<L>    Ca    9.7      06 Sep 2021 00:25  Phos  2.9       Mg     1.8         TPro  7.4  /  Alb  3.0<L>  /  TBili  0.4  /  DBili  x   /  AST  86<H>  /  ALT  187<H>  /  AlkPhos  282<H>      LIVER FUNCTIONS - ( 06 Sep 2021 00:25 )  Alb: 3.0 g/dL / Pro: 7.4 g/dL / ALK PHOS: 282 U/L / ALT: 187 U/L / AST: 86 U/L / GGT: x             ABG - ( 06 Sep 2021 00:04 )  pH, Arterial: 7.40  pH, Blood: x     /  pCO2: 53    /  pO2: 158   / HCO3: 33    / Base Excess: 7.1   /  SaO2: 100.0               ======================Micro/Rad/Cardio=================  Culture: Reviewed   CXR: Reviewed  Echo:Reviewed  ======================================================  PAST MEDICAL & SURGICAL HISTORY:  CHF (congestive heart failure)    CAD (coronary artery disease)    Depression    Pleural effusion    History of 2019 novel coronavirus disease (COVID-19)  2020    Hemorrhoids    Bleeding hemorrhoids    Peripheral arterial disease    Claudication    BPH with urinary obstruction    ACC/AHA stage D systolic heart failure    Anticoagulation goal of INR 2.0 to 2.5    Falls    Clavicle fracture    CKD (chronic kidney disease), stage III    Iron deficiency anemia    H/O epistaxis    Vertigo    GI bleed    S/P TVR (tricuspid valve repair)    S/P ventricular assist device    S/P endoscopy      ====================ASSESSMENT ==============  67 YO M with a history of stage D NICM s/p HM2 on 2017 as DT (due to severe PAD) with TV ring, prior COVID-19 infection 2020, recurrent syncope post LVAD s/p ILR, and chronic abdominal pain with prior negative workup who was admitted with symptomatic anemia with Hgb 4.5 in setting of INR 8.8. Hospital course c/b cardiogenic shock, hemodynamic instability, acute hypoxemic respiratory failure s/p tracheostomy, GI bleed S/P Enteroscopy , Anemia, in setting of melena, chronic kidney disease, stress hyperglycemia, C.diff positive on , hypovolemic shock, septic shock, Enterobacter pneumonia , GB thickening/percholecystic s/p perc choley by IT , SMA stenosis    Plan:  ====================== NEUROLOGY=====================  Nonfocal, continue to monitor neuro status   Deconditioned, PT/Ambulate as tolerated    For depression,   mirtazapine 7.5 milliGRAM(s) Oral daily  sertraline 100 milliGRAM(s) Oral daily    ==================== RESPIRATORY======================  Acute hypoxemic respiratory failure s/p #8 shiley trach on , continue TC as tolerated (TC since )  Continue close monitoring of respiratory rate and breathing pattern, following of ABG’s with A-line monitoring, continuous pulse oximetry monitoring.   Moderate secretions, continue suctioning prn, pulmonary toileting.     ====================CARDIOVASCULAR==================  Stage D Nonischemic Cardiomyopathy, Status Post HM2 on 2017; LVAD settings 9200, flow 5.7  TTE : reveals at least moderate MR, mild AI, severe global LV syst dysfxn, dec RV fxn   Continue invasive hemodynamic monitoring     For rate control of HR 2/2 Hyperthyroidism, titrate as tolerated per Endo  propranolol 40 milliGRAM(s) Oral every 6 hours    ===================HEMATOLOGIC/ONC ===================  Acute blood loss anemia, monitor H&H/Plts    Holding coumadin and aspirin indefinitely given recurrent GI bleeding. Continue to monitor LDH daily.    VTE prophylaxis,   heparin   Injectable 5000 Unit(s) SubCutaneous every 8 hours    ===================== RENAL =========================  Continue to monitor I/Os, BUN/Creatinine, and urine output.   Euvolemic, Even fluid balance, currently off diuretics.    Replete lytes PRN. Keep K> 4 and Mg >2.     ==================== GASTROINTESTINAL===================  S/p cholecystostomy tube placed on  with IR   CTA A/P : Evaluation of the mesenteric vessels is limited by streak artifact from LVAD. There appears to be severe stenosis of the proximal SMA; abdominal mesenteric doppler is recommended for further evaluation. 2.  No small bowel findings to suggest acute mesenteric ischemia. 3.  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  Mesenteric duplex on 8/15 borderline stenosis of proximal SMA. ?Mesenteric angiogram with vascular this coming week.  For repeat JASMIN US to reassess gallbladder with perc sybil tube  Vital 1.5 shantelle feeds - Continue increasing tube feeds 5cc/day for goal of 65cc/hr as tolerated.   CT Abd/Pel on  without acute process   Reglan for gut motility  Zofran PRN nausea/vomiting     GI prophylaxis,   pantoprazole  Injectable 40 milliGRAM(s) IV Push every 12 hours    For gut motility   metoclopramide Injectable 10 milliGRAM(s) IV Push every 8 hours    PRN nausea/vomiting   ondansetron Injectable 4 milliGRAM(s) IV Push every 6 hours PRN Nausea and/or Vomiting    PRN gas   simethicone 80 milliGRAM(s) Chew every 8 hours PRN Gas    thiamine 100 milliGRAM(s) Oral daily   multiple electrolytes Injection Type 1 1000 mill iLiter(s) (30 mL/Hr) IV Continuous <Continuous>  multivitamin 1 Tablet(s) Oral daily    =======================    ENDOCRINE  =====================  Stress hyperglycemia, continue glucose control with   insulin lispro (ADMELOG) corrective regimen sliding scale   SubCutaneous every 6 hours  insulin NPH human recombinant 6 Unit(s) SubCutaneous <User Schedule>  glucagon  Injectable 1 milliGRAM(s) IntraMuscular once      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 Will initiate today.      - F/u thyroid studies and d/w endocrinology      - Management with   hydrocortisone sodium succinate Injectable 50 milliGRAM(s) IV Push every 8 hours  methimazole 20 milliGRAM(s) Oral <User Schedule>    cholestyramine Powder (Sugar-Free) 4 Gram(s) Oral every 12 hours  dextrose 40% Gel 15 Gram(s) Oral once  dextrose 50% Injectable 25 Gram(s) IV Push once  dextrose 50% Injectable 12.5 Gram(s) IV Push once    ========================INFECTIOUS DISEASE================  Afebrile, WBC within normal limits   Continue trending WBC and monitoring fever curve   Culture from sybil drain on : NGTD  ID following, MD Prater, appreciate recommendations      Patient requires continuous monitoring with bedside rhythm monitoring, pulse ox monitoring, and intermittent blood gas analysis. Care plan discussed with ICU care team. Patient remained critical and at risk for life threatening decompensation.     By signing my name below, I, Agnieszka Cherry, attest that this documentation has been prepared under the direction and in the presence of CLAUDIA Gale   Electronically signed: Cesia Shaffer, 21 @ 12:28    I, Aleksandra Olivas, personally performed the services described in this documentation. all medical record entries made by the scribe were at my direction and in my presence. I have reviewed the chart and agree that the record reflects my personal performance and is accurate and complete  Electronically signed: CLAUDIA Gale

## 2021-09-07 NOTE — PROGRESS NOTE ADULT - ATTENDING COMMENTS
No acute events overnight.  Reports persistence of abdominal discomfort. Abdomen is soft on the physicla exam.   He is in good spirits. Tolerating gentle uptitrating of feeds.   Pending angiogram by vascular surgery.

## 2021-09-07 NOTE — PROGRESS NOTE ADULT - ASSESSMENT
Assessment and Recommendation:   · Assessment	  Assessment and recommendation :  Recurrent Acute hypoxic respiratory Failure S/P tracheostomy on trach. collar  50% FI02,   Acute blood loss anemia S/P multiple  blood transfusion   S/P septic shock off vasopressin , levophed and off  Dobutamine   S/P cholecystostomy tube placement by IR    AF RVR back to regular sinus Rhythm   Non ischemic cardiomyopathy continue ACE inhibitor and B-Blockers   S/P Septic shock and cardiogenic shock   Stage D systolic heart failure S/P LVAD HM2   MH2 LVAD  with  TV Annuloplasty  Severe peripheral vascular disease   severe hyperglycemia on insulin coverage    Reglan 10 mg for Gastric Motility   hyperthyroidism on Methimazole 10mg TID   critical care polyneuropathy   Anemia of Acute blood Loss   severe protein caloric malnutrition   S/P blood and FFP transfusion   Chronic kidney disease stage III  Depressed and withdrawn   resume  NGT feeding .tolerating it well   Decannulation ?  GI prophylaxis with PPI

## 2021-09-07 NOTE — PROGRESS NOTE ADULT - SUBJECTIVE AND OBJECTIVE BOX
[facility] NUTRITION SUPPORT-- FOLLOW UP NOTE  --------------------------------------------------------------------------------    24 hour events/subjective: pt seen and examined. resting in bed. feels ok. tolerating tf at 45cc/hr. denies n/v/acute abd pain. +gi fxn. pending ruq US to reassess gb/perc c tube.    Diet:  Diet, NPO with Tube Feed:   Tube Feeding Modality: Nasogastric  Vital 1.5 Tank (VITAL1.5RTH)  Total Volume for 24 Hours (mL): 1560  Continuous  Starting Tube Feed Rate mL per Hour: 10  Increase Tube Feed Rate by (mL): 5     Every 24 hours  Until Goal Tube Feed Rate (mL per Hour): 65  Tube Feed Duration (in Hours): 24  Tube Feed Start Time: 11:45  Supplement Feeding Modality:  Nasogastric  Probiotic Yogurt/Smoothie Cans or Servings Per Day:  2       Frequency:  Daily (21 @ 00:07)      PAST HISTORY  --------------------------------------------------------------------------------  No significant changes to PMH, PSH, FHx, SHx, unless otherwise noted    ALLERGIES & MEDICATIONS  --------------------------------------------------------------------------------  Allergies    Amiodarone Hydrochloride (Other (Severe))    Intolerances      Standing Inpatient Medications  cholestyramine Powder (Sugar-Free) 4 Gram(s) Oral every 12 hours  dextrose 40% Gel 15 Gram(s) Oral once  dextrose 50% Injectable 25 Gram(s) IV Push once  dextrose 50% Injectable 12.5 Gram(s) IV Push once  glucagon  Injectable 1 milliGRAM(s) IntraMuscular once  heparin   Injectable 5000 Unit(s) SubCutaneous every 8 hours  hydrocortisone sodium succinate Injectable 50 milliGRAM(s) IV Push every 8 hours  insulin lispro (ADMELOG) corrective regimen sliding scale   SubCutaneous every 6 hours  insulin NPH human recombinant 6 Unit(s) SubCutaneous <User Schedule>  methimazole 20 milliGRAM(s) Oral <User Schedule>  metoclopramide Injectable 10 milliGRAM(s) IV Push every 8 hours  mirtazapine 7.5 milliGRAM(s) Oral daily  multiple electrolytes Injection Type 1 1000 milliLiter(s) IV Continuous <Continuous>  multivitamin 1 Tablet(s) Oral daily  pantoprazole  Injectable 40 milliGRAM(s) IV Push every 12 hours  propranolol 40 milliGRAM(s) Oral every 6 hours  sertraline 100 milliGRAM(s) Oral daily  thiamine 100 milliGRAM(s) Oral daily    PRN Inpatient Medications  ondansetron Injectable 4 milliGRAM(s) IV Push every 6 hours PRN  simethicone 80 milliGRAM(s) Chew every 8 hours PRN        REVIEW OF SYSTEMS  --------------------------------------------------------------------------------    General: see hpi  HEENT:  no headaches, no vision changes, no ear pain, no epistaxis   CV:  no chest pain, no palpitations  Resp:  no dyspnea  GI:  see hpi  :  no pain, no bleeding, no dysuria  Muscle:  no pain, no weakness  Neuro:  no numbness, no tingling, no memory problems  Psych:  no insomnia  Endocrine:  no cold/heat intolerance  Heme: no ecchymosis, no easy bruising  Skin:  no rash, no edema        VITALS/PHYSICAL EXAM  --------------------------------------------------------------------------------  T(C): 35.9 (21 @ 08:00), Max: 36.3 (21 @ 16:00)  HR: 73 (21 @ 08:00) (71 - 90)  BP: --  RR: 22 (21 @ 08:00) (14 - 43)  SpO2: 100% (21 @ 08:00) (99% - 100%)  Wt(kg): --      21 @ 07:01  -  21 @ 07:00  --------------------------------------------------------  IN: 2155 mL / OUT: 1201 mL / NET: 954 mL      I&O's Detail    06 Sep 2021 07:01  -  07 Sep 2021 07:00  --------------------------------------------------------  IN:    Enteral Tube Flush: 370 mL    Miscellaneous Tube Feedin mL    multiple electrolytes Injection Type 1.: 720 mL  Total IN: 2155 mL    OUT:    Drain (mL): 500 mL    Emesis (mL): 1 mL    Voided (mL): 700 mL  Total OUT: 1201 mL    Total NET: 954 mL          Physical Exam:  Gen: lying in bed in nad  HEENT: ncat, trachea midline +ngt  Chest: respirations non labored  Abd: soft non distended +perc c tube  LE: no edema  Neuro: awake alert appropriate        LABS/STUDIES  --------------------------------------------------------------------------------              7.5    8.44  >-----------<  216      [21 @ 00:20]              24.5     136  |  101  |  15  ----------------------------<  118      [21 @ 00:18]  4.0   |  27  |  0.48        Ca     9.5     [21 @ 00:18]      Mg     1.7     [21 @ 00:18]      Phos  2.9     [21 @ 00:18]    TPro  7.0  /  Alb  2.9  /  TBili  0.3  /  DBili  x   /  AST  82  /  ALT  192  /  AlkPhos  253  [21 @ 00:18]              [21 @ 00:18]    Ca ionizedBlood Gas Arterial - Calcium, Ionized: 1.31 mmol/L (21 @ 00:09)  Blood Gas Arterial - Calcium, Ionized: 1.35 mmol/L (21 @ 00:04)    Creatinine Trend:  POC glucoseGlucose, Serum: 118 mg/dL (21 @ 00:18)  CAPILLARY BLOOD GLUCOSE      POCT Blood Glucose.: 113 mg/dL (07 Sep 2021 06:16)  POCT Blood Glucose.: 123 mg/dL (07 Sep 2021 00:04)  POCT Blood Glucose.: 148 mg/dL (06 Sep 2021 17:19)  POCT Blood Glucose.: 140 mg/dL (06 Sep 2021 11:21)    PrealbuminPrealbumin, Serum: 11 mg/dL (21 @ 04:01)  Prealbumin, Serum: 10 mg/dL (08-15-21 @ 13:53)    Triglycerides

## 2021-09-07 NOTE — CONSULT NOTE ADULT - ASSESSMENT
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 coli UTIs, driveline drainage (1/7/21) and COVID-19, here initially for GIB prolonged hospital course, s/p tracheostomy, acalculous cholecystitis s/p perc dawn. Patient has persistent intermittent abdominal pain, CTA showing possible proximal SMA stenosis. CTA poor quality limited by significant streak artifact. Mesenteric duplex velocities without evidence of significant stenosis, however study unreliable in the setting of patient's augmented systolic function given LVAD. Patient continues to report abdominal pain.    - Will plan for mesenteric angiogram some time this admission pending attending availability  - Please document clearance for OR for mesenteric angiogram  - Continue excellent care per CTICU    Discussed with fellow, Dr. Lowery, on behalf of attending, Dr. Bran.     Rimma Hou MD  PGY2 Consult Resident  Vascular Surgery  [id number]   65M PMH ACC/AHA stage D HF due to NICM HM2 LVAD , TV annuloplasty ring 9/12/17 as destination therapy due to severe peripheral artery disease with significant stenosis  SIADH, Depression, CKD-3 with hyperkalemia, past E. coli UTIs, driveline drainage (1/7/21) and COVID-19, here initially for GIB prolonged hospital course, s/p tracheostomy, acalculous cholecystitis s/p perc dawn. Patient has persistent intermittent abdominal pain, CTA showing possible proximal SMA stenosis. CTA poor quality limited by significant streak artifact. Mesenteric duplex velocities without evidence of significant stenosis, however study unreliable in the setting of patient's augmented systolic function given LVAD. Patient continues to report abdominal pain.    - Will plan for mesenteric angiogram some time this admission pending attending availability  - Please document clearance for OR for mesenteric angiogram  - Continue excellent care per CTICU    Discussed with fellow, Dr. Lowery, on behalf of attending, Dr. Bran.     Rimma Hou MD  PGY2 Consult Resident  Vascular Surgery  [phone]      ATTENDING STATEMENT:  Full consult note as above; discussed with surgery resident and vascular fellow.   He has an LVAD and now has a trach  and PEG tube. He was not tolerating feeds well. He was complaining of abdominal pain when he was fed. A CT scan noted some potential stenosis in the SMA. There is significant artifact due to the LVAD. A duplex scan did not note a severe stenosis in the SMA, but in the setting of using an LVAD the systolic pressure is lower and therefore the normal velocity numbers for a SMA stenosis are not appropriate. This is similar but opposite to when a pt has a balloon pump. In that setting the numbers can be higher especially in the carotid arteries. I feel he should get a mesenteric angiogram and possible stent. If a stent is to be inserted then he would need to be on antiplatelet therapy with Plavix. Due to his GI bleeding it needs to be determined if he can be kept on Plavix. If Plavix cannot be maintained then it would not be appropriate to insert a stent.

## 2021-09-07 NOTE — PROGRESS NOTE ADULT - PROBLEM SELECTOR PLAN 2
- Continue current speed of 9200 RPM. Speed increased this admission due to signs of inadequately unloaded left ventricle  - MAP is at goal at this time  - Holding coumadin and aspirin indefinitely given recurrent GI bleeding.   - Continue to monitor LDH daily

## 2021-09-07 NOTE — PROGRESS NOTE ADULT - ASSESSMENT
65 year old male with history of Stage D HF due to NICM on LVAD, TV annuloplasty ring 9/12/17 as destination therapy due to severe peripheral artery disease with significant stenosis  SIADH, Depression, CKD-3 with hyperkalemia, admitted 6/14/21 with symptomatic anemia with Hgb 4.5 in setting of INR 8.8, thereafter with complicated hospital course including VAP, serratia bacteremia with acalculous cholecystitis s/p percutaneous tube, abdominal pain with unclear source other than possible mesenteric ischemia (from possible SMA stenosis? 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 Nearly two months later, on 8/7 - TSH was < 0.01  Likely 2/2 amiodarone use, exacerbated by IV contrast imaging (7/28 and 8/13). Toxic nodule is possible. Less likely Graves (negative TSH receptor Ab and TSI, but TPO Ab positive at 49.9).   Patient has no prior hx of thyroid disease (slightly elevated TSH to 4.92 in 2017 when acutely ill), which is the case in Type 2 Amiodarone induced thyroiditis. However, thyrotoxicosis presented shortly after initiation of amiodarone, which is more characteristic of Type 1 Amiodarone induced thyroiditis.     Labs from 9/7 - improving  TSH <0.01, FT4 5.3 (from > 7.8)   TT3 126 (from 165 and 171)    Recs  -Avoid any tests with IV contrast  -Recheck FT4, TT3 on 9/9/21  -For now, continue Methimazole 20 mg BID (8 AM and 8 PM). If any vomiting, would recommend administer Methimazole via rectal route for better absorption. Alk phos, AST and ALT are elevated and stable range, but etiologies can be multifactorial. Watch for sudden changes in LFTs or biliary labs  -Continue Cholestyramine 4g TID  -Continue Propranolol 40 mg q 6 hrs (need 160 mg/day to dec peripheral conversion of T4 to T3). Goal HR 60-80. At goal   -Continue Hydrocortisone 50 mg IV q 8 hrs (steroids used in treatment of Type 2 Amiodarone induced thyroiditis). Can also help with hypotension  In some cases of refractory amio induced thyrotoxicocis, total thyroidectomy has been recommended however the pt is likely a poor surgical candidate given his multiple comorbidities     #Steroid induced hyperglycemia   HbA1c 5.4%. FS at goal 100-180  -can c/w NPH 6 units q6 hrs  -continue moderate scale sq q6 if on tube feeds  -if no tube feeds are on, low dose ISS q6hrs and hold NPH  -BS testing q6    #Amiodarone Use  Likely contributing to thyrotoxicosis as noted above  Amiodarone stopped 8/7   -Tx as above     Sia Aceves MD  Endocrine Fellow  Pager: [phone]/TIMBO [phone]  Consults 9am-5pm: [phone]  After 5pm and weekends: [phone]     65 year old male with history of Stage D HF due to NICM on LVAD, TV annuloplasty ring 9/12/17 as destination therapy due to severe peripheral artery disease with significant stenosis  SIADH, Depression, CKD-3 with hyperkalemia, admitted 6/14/21 with symptomatic anemia with Hgb 4.5 in setting of INR 8.8, thereafter with complicated hospital course including VAP, serratia bacteremia with acalculous cholecystitis s/p percutaneous tube, abdominal pain with unclear source other than possible mesenteric ischemia (from possible SMA stenosis? 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 Nearly two months later, on 8/7 - TSH was < 0.01  Likely 2/2 amiodarone use, exacerbated by IV contrast imaging (7/28 and 8/13). Toxic nodule is possible. Less likely Graves (negative TSH receptor Ab and TSI, but TPO Ab positive at 49.9).   Patient has no prior hx of thyroid disease (slightly elevated TSH to 4.92 in 2017 when acutely ill), which is the case in Type 2 Amiodarone induced thyroiditis. However, thyrotoxicosis presented shortly after initiation of amiodarone, which is more characteristic of Type 1 Amiodarone induced thyroiditis.     Labs from 9/7 - improving  TSH <0.01, FT4 5.3 (from > 7.8)   TT3 126 (from 165 and 171)    Recs  -Avoid any tests with IV contrast  -Recheck FT4, TT3 on 9/9/21  -For now, continue Methimazole 20 mg BID (8 AM and 8 PM). If any vomiting, would recommend administer Methimazole via rectal route for better. absorption. Alk phos, AST and ALT are elevated and stable range, but etiologies can be multifactorial. Watch for sudden changes in LFTs or biliary labs  -Continue Cholestyramine 4g TID  -Continue Propranolol 40 mg q 6 hrs (need 160 mg/day to dec peripheral conversion of T4 to T3). Goal HR 60-80. At goal   -Continue Hydrocortisone 50 mg IV q 8 hrs (steroids used in treatment of Type 2 Amiodarone induced thyroiditis). Can also help with hypotension  In some cases of refractory amio induced thyrotoxicocis, total thyroidectomy has been recommended however the pt is likely a poor surgical candidate given his multiple comorbidities     #Steroid induced hyperglycemia   HbA1c 5.4%. FS at goal 100-180  -can c/w NPH 6 units q6 hrs. Currently below goal TF rate (at 50 cc/hr, goal is 65 cc/hr). Will address insulin requirements daily as feed rate increases.   -continue moderate scale sq q6 if on tube feeds  -if no tube feeds are on, low dose ISS q6hrs and hold NPH  -BS testing q6    #Amiodarone Use  Likely contributing to thyrotoxicosis as noted above  Amiodarone stopped 8/7   -Tx as above     Sia Aceves MD  Endocrine Fellow  Pager: [phone]/TIMBO [phone]  Consults 9am-5pm: [phone]  After 5pm and weekends: [phone]     65 year old male with history of Stage D HF due to NICM on LVAD, TV annuloplasty ring 9/12/17 as destination therapy due to severe peripheral artery disease with significant stenosis  SIADH, Depression, CKD-3 with hyperkalemia, admitted 6/14/21 with symptomatic anemia with Hgb 4.5 in setting of INR 8.8, thereafter with complicated hospital course including VAP, serratia bacteremia with acalculous cholecystitis s/p percutaneous tube, abdominal pain with unclear source other than possible mesenteric ischemia (from possible SMA stenosis? 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 Nearly two months later, on 8/7 - TSH was < 0.01  Likely 2/2 amiodarone use, exacerbated by IV contrast imaging (7/28 and 8/13). Toxic nodule is possible. Less likely Graves (negative TSH receptor Ab and TSI, but TPO Ab positive at 49.9).   Patient has no known prior hx of thyroid disease (slightly elevated TSH to 4.92 in 2017 when acutely ill), which is the case in Type 2 Amiodarone induced thyroiditis. However, thyrotoxicosis presented shortly after initiation of amiodarone, which is more characteristic of Type 1 Amiodarone induced thyroiditis. Brenda also has positive TPO Ab, which can also be seen in Type 1 Amiodarone induced thyrotoxicosis    Labs from 9/7 - improving  TSH <0.01, FT4 5.3 (from > 7.8)   TT3 126 (from 165 and 171)    Recs  -Avoid any tests with IV contrast  -Recheck only free T4 and total T3on 9/9/21  -Decrease Methimazole to 20 mg daily. If any vomiting, would recommend administer Methimazole via rectal route for better. absorption. Alk phos, AST and ALT are elevated and stable range, but etiologies can be multifactorial. Watch for sudden changes in LFTs or biliary labs  -Continue Cholestyramine 4g TID. Will reassess need for this daily   -Decrease Propranolol 40 mg q 8 hrs. Goal HR 60-80. At goal   -Start tapering steroids as follows:   1. 9/8, 9/9, 9/10: Hydrocortisone 50 mg q 12 hrs, stop after 6 doses  2. 9/11, 9/12, 9/13: Hydrocortisone 25 mg q 12 hrs, stop after 6 doses  3. 9/14, 9/15: Hydrocortisone 25 mg daily, stop after 2 doses  Stop steroids thereafter.       #Steroid induced hyperglycemia   HbA1c 5.4%. FS at goal 100-180  -can c/w NPH 6 units q6 hrs. Currently below goal TF rate (at 50 cc/hr, goal is 65 cc/hr). Will address insulin requirements daily as feed rate increases.   -continue moderate scale sq q6 if on tube feeds  -if no tube feeds are on, low dose ISS q6hrs and hold NPH  -BS testing q6    #Amiodarone Use  Likely contributing to thyrotoxicosis as noted above  Amiodarone stopped 8/7   -Tx as above     Discussed with Dr. Beatty and primary team     Sia Aceves MD  Endocrine Fellow  Pager: [phone]/TIMBO [phone]  Consults 9am-5pm: [phone]  After 5pm and weekends: [phone]     65 year old male with history of Stage D HF due to NICM on LVAD, TV annuloplasty ring 9/12/17 as destination therapy due to severe peripheral artery disease with significant stenosis  SIADH, Depression, CKD-3 with hyperkalemia, admitted 6/14/21 with symptomatic anemia with Hgb 4.5 in setting of INR 8.8, thereafter with complicated hospital course including VAP, serratia bacteremia with acalculous cholecystitis s/p percutaneous tube, abdominal pain with unclear source other than possible mesenteric ischemia (from possible SMA stenosis? 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 Nearly two months later, on 8/7 - TSH was < 0.01  Likely 2/2 amiodarone use, exacerbated by IV contrast imaging (7/28 and 8/13). Toxic nodule is possible. Less likely Graves (negative TSH receptor Ab and TSI, but TPO Ab positive at 49.9).   Patient has no known prior hx of thyroid disease (slightly elevated TSH to 4.92 in 2017 when acutely ill), which is the case in Type 2 Amiodarone induced thyroiditis. However, thyrotoxicosis presented shortly after initiation of amiodarone, which is more characteristic of Type 1 Amiodarone induced thyroiditis. Patient also has positive TPO Ab, which can also be seen in Type 1 Amiodarone induced thyrotoxicosis    Labs from 9/7 - improving  TSH <0.01, FT4 5.3 (from > 7.8)   TT3 126 (from 165 and 171)    Recs  -Avoid any tests with IV contrast  -Recheck only free T4 and total T3on 9/9/21  -Decrease Methimazole to 20 mg daily. If any vomiting, would recommend administer Methimazole via rectal route for better. absorption. Alk phos, AST and ALT are elevated and stable range, but etiologies can be multifactorial. Watch for sudden changes in LFTs or biliary labs  -Continue Cholestyramine 4g TID. Will reassess need for this daily   -Decrease Propranolol 40 mg q 8 hrs. Goal HR 60-80. At goal   -Start tapering steroids as follows:   1. 9/8, 9/9, 9/10: Hydrocortisone 50 mg q 12 hrs, stop after 6 doses  2. 9/11, 9/12, 9/13: Hydrocortisone 25 mg q 12 hrs, stop after 6 doses  3. 9/14, 9/15: Hydrocortisone 25 mg daily, stop after 2 doses  Stop steroids thereafter.       #Steroid induced hyperglycemia   HbA1c 5.4%. FS at goal 100-180  -can c/w NPH 6 units q6 hrs. Currently below goal TF rate (at 50 cc/hr, goal is 65 cc/hr). Will address insulin requirements daily as feed rate increases.   -continue moderate scale sq q6 if on tube feeds  -if no tube feeds are on, low dose ISS q6hrs and hold NPH  -BS testing q6    Discussed with Dr. Beatty and primary team     Sia Aceves MD  Endocrine Fellow  Pager: [phone]/TIMBO [phone]  Consults 9am-5pm: [phone]  After 5pm and weekends: [phone]

## 2021-09-07 NOTE — PROGRESS NOTE ADULT - SUBJECTIVE AND OBJECTIVE BOX
RICKY JOINT  MRN#: [id number]  Subjective:  Pulmonary progress  : recurrent Acute hypoxic respiratory Failure ,aspiration pneumonia, NICM  , chart reviewed and H/O obtained radiological and Laboratory study reviewed patient Examined     65M PMH ACC/AHA stage D HF due to NICM HM2 LVAD , TV annuloplasty ring 17 as destination therapy due to severe peripheral artery disease with significant stenosis  SIADH, Depression, CKD-3 with hyperkalemia, past E. coli UTIs, driveline drainage (21) and COVID-19 (back in 2020)  He was recently seen in clinic where he complained of abdominal pain and dark stools w constipation back in May. He presents to [facility] ER today weakness and fatigue, moderate and + Black stools for three days, on coumadin secondary to warfarin use in the setting of an LVAD. Patient has required transfusions for GIB in the past. Mostly recently back in 2021 pt had anemia with dark stools. No interventions was done at that time. However Last Endoscopy was done in 2020 (negative). Today labs show patient is anemic with H/H of 4.5/16.3,. INR is 8.84 MAP in the 90s, Temp 35.1. He denies any chest pain, shortness of breath, dizziness, abd pain, nausea or vomiting. found to have  rectal bleeding underwent endoscopy ,old blood in the proximal ileum ,  develop sepsis with LL opacity given Antibiotics , Extubated , reintubated , Bronchoscopy on Zosyn for LL pneumonia  and Amiodarone S/P TV Annuloplasty , patient remain intubated on full ventilatory support .S/P multiple units of blood transfusion , remain on full ventilatory support on Precedex and propofol , new central IJ line , diarrhea C diff. +ve on po vancomycin and IV Flagyl,  mildly distended belly , fever start on cefepime 2gm q 8 hrs S/P tracheostomy .  new RT Subclavian central line continue on contact  isolation ,still diarrhea on C-diff antibiotics ENT follow up appreciated , trial of C-PAP as tolerated , , copious secretion from trach. site chest x ray left lower lobe pneumonia , tolerating trch. collar 50% FI02 still excessive secretion need pulmonary toilet , off Ancef antibiotic , no more diarrhea back on full support mechanical ventilator , chest x ray show improvement in LLL air space disease, more awake and responsive on tube feeding no more diarrhea , S/P  Ancef for bacteremia no fever ,ID follow up noted ,  no nausea or vomiting or diarrhea still very weak and tired , event note pulled NG tube now replaced , back on tube feeding ,still on po vancomycin , getting PT and OT at bed side , no plan for decannulation for now. , no more diarrhea receiving PT and OPT at bed side , minimal secretion from tracheostomy site , no SOB getting stronger , improve muscle tone patient transfer to monitor bed still on contact isolation for C-Difficel colitis on 50% FI02, NG tube clogged , and change to resume tube feeding still loose stool . H/H drop significantly require blood transfusion , most likely GI bleeding , IV heparin D/C , vomiting 200 cc of creamy color tube feeding on hold no sob, still melena monitor in the CTU possible capsule endoscopy , H/H is stable ., patient develop TR sided  pneumothorax require chest tube placement , RT IJ central line  placed , develop fever shaking chills , blood culture positive for serratia marcescens , start on cefepime .the patient  become hypoxic and hypotensive placed on full ventilatory support and Vasopressin , levophed and Dobutamine ,S/P blood transfusion on meropenem and vancomycin ,   , on and  off pressors , occasional agitation on Precedex .S/P IR cholecystostomy tube drainage placement in the RT upper Quadrant , resume anticoagulation chest x ray noted C-PAP trail lasted only for 2 hrs , new RT SC line and D/C RT IJ line , RT pig tail cathter has been removed , tolerating C-PAP trial placed on trach. collar 50% FI02 GI consultant noted on NG tube feeding as tolerated , develop AF RVR S/P  bolus Amiodarone  back to regular sinus Rhythm , Flat Affect depressed , back on tube feeding Vital AF at 60 cc/ hr .still intermittent abdominal pain , no fever saturation is accepted  back on full ventilatory support , tired and sleepy on round  .start  back on  tube feeding . tolerating it very well , no nausea or vomiting , good 02 saturation on trac-collar on methimazole for hyperthyroidism , no new C/O no plan for decannulation yet , electrolyte blood test is still pending .on respiratory isolation sputum culture is negative , increase WBC  improved on cefepime , sputum positive for enterococcus , condition is the same ,still C/O Abdominal pain , white count is improving , no chest pain or SOB , speech and swallow  evaluation appreciated , recommend continue NG tube feeding , Antibiotic is discontinue .intermittent vomiting , moderate trach. secretion .placed on Reglan 10 mg TID for gastric motility , depressed  withdrawn.        (2021 16:57)    PAST MEDICAL & SURGICAL HISTORY:  CHF (congestive heart failure)    CAD (coronary artery disease)  Depression    Pleural effusion    History of 2019 novel coronavirus disease (COVID-19)  2020    Hemorrhoids    Bleeding hemorrhoids    Peripheral arterial disease    Claudication    BPH with urinary obstruction    ACC/AHA stage D systolic heart failure  Anticoagulation goal of INR 2.0 to 2.5    Falls    Clavicle fracture    CKD (chronic kidney disease), stage III    Iron deficiency anemia    H/O epistaxis    Vertigo    GI bleed    S/P TVR (tricuspid valve repair)    S/P ventricular assist device    S/P endoscopy    OBJECTIVE:  ICU Vital Signs Last 24 Hrs  T(C): 38.2 (2021 10:00), Max: 38.5 (2021 12:00)  T(F): 100.8 (2021 10:00), Max: 101.3 (2021 12:00)  HR: 65 (2021 10:00) (61 - 69)  BP: --  BP(mean): --  ABP: 105/67 (2021 10:00) (90/54 - 113/64)  ABP(mean): 77 (2021 10:00) (63 - 77)  RR: 20 (2021 10:00) (19 - 35)  SpO2: 99% (2021 10:00) (96% - 100%)       @ 07: @ 07:00  --------------------------------------------------------  IN: 2693.9 mL / OUT: 1415 mL / NET: 1278.9 mL     @ 07:01  -   @ 10:49  --------------------------------------------------------  IN: 420.8 mL / OUT: 115 mL / NET: 305.8 mL  PHYSICAL EXAM:Daily   Elderly male S/P tracheostomy on trach. collar 50% FI02 ,  Daily Weight in k.4 (2021 00:00)  HEENT:     + NCAT  + EOMI  - Conjuctival edema   - Icterus   - Thrush   - ETT  + NGT/OGT  Neck:         + FROM  RT IJ  line JVD  - Nodes - Masses + Mid-line trachea + Tracheostomy  Chest:            normal A-P diameter    Lungs:          + CTA   + Rhonchi    - Rales    - Wheezing + Decreased  LT BS   - Dullness R L  Cardiac:       + S1 + S2    + RRR   - Irregular   - S3  - S4    - Murmurs   - Rub   - Hamman’s sign   Abdomen:    + BS  + Soft + Non-tender - Distended - Organomegaly - PEG .cholecystostomy tube in place  Extremities:   - Cyanosis U/L   - Clubbing  U/L  + LE/UE Edema   + Capillary refill    + Pulses   Neuro:        - Awake   -  Alert   - Confused   - Lethargic   + Sedated  + Generalized Weakness  Skin:        - Rashes    - Erythema   + Normal incisions   + IV sites intact          HOSPITAL MEDICATIONS: All medications reviewed and analyzed  MEDICATIONS  (STANDING):  amiodarone    Tablet 200 milliGRAM(s) Oral daily  chlorhexidine 0.12% Liquid 15 milliLiter(s) Oral Mucosa every 12 hours  chlorhexidine 2% Cloths 1 Application(s) Topical <User Schedule>  dexmedetomidine Infusion 0.5 MICROgram(s)/kG/Hr (9.81 mL/Hr) IV Continuous <Continuous>  dextrose 50% Injectable 50 milliLiter(s) IV Push every 15 minutes  heparin  Infusion 400 Unit(s)/Hr (12.5 mL/Hr) IV Continuous <Continuous>  Hydromorphone  Injectable 0.5 milliGRAM(s) IV Push once  insulin lispro (ADMELOG) corrective regimen sliding scale   SubCutaneous every 6 hours  pantoprazole  Injectable 40 milliGRAM(s) IV Push every 12 hours  piperacillin/tazobactam IVPB.. 3.375 Gram(s) IV Intermittent every 8 hours  propofol Infusion 20 MICROgram(s)/kG/Min (9.42 mL/Hr) IV Continuous <Continuous>  sodium chloride 0.9% lock flush 3 milliLiter(s) IV Push every 8 hours  sodium chloride 0.9%. 1000 milliLiter(s) (10 mL/Hr) IV Continuous <Continuous>    MEDICATIONS  (PRN):  acetaminophen    Suspension .. 650 milliGRAM(s) Oral every 6 hours PRN Temp greater or equal to 38C (100.4F)    LABS: All Lab data reviewed and analyzed                         7.5    8.44  )-----------( 216      ( 07 Sep 2021 00:20 )      136  |  101  |  15  ----------------------------<  118<H>  4.0   |  27  |  0.48<L>    Ca    9.5      07 Sep 2021 00:18  Phos  2.9     09-07  Mg     1.7         TPro  7.0  /  Alb  2.9<L>  /  TBili  0.3  /  DBili  x   /  AST  82<H>  /  ALT  192<H>  /  AlkPhos  253<H>                 24.5   Ca    9.7      06 Sep 2021 00:25  Phos  2.9     -  Mg     1.8         TPro  7.4  /  Alb  3.0<L>  /  TBili  0.4  /  DBili  x   /  AST  86<H>  /  ALT  187<H>  /  AlkPhos  282<H>      Ca    10.1      05 Sep 2021 01:48  Phos  3.5     -05  Mg     1.9         TPro  7.8  /  Alb  3.1<L>  /  TBili  0.4  /  DBili  x   /  AST  75<H>  /  ALT  179<H>  /  AlkPhos  289<H>      Ca    10.1      04 Sep 2021 00:40  Phos  3.0     09-04  Mg     1.8         TPro  8.0  /  Alb  3.1<L>  /  TBili  0.5  /  DBili  x   /  AST  79<H>  /  ALT  178<H>  /  AlkPhos  318<H>                                                                                                                        PTT - ( 2021 04:52 )  PTT:45.2 sec LIVER FUNCTIONS - ( 2021 00:42 )  Alb: 3.4 g/dL / Pro: 6.7 g/dL / ALK PHOS: 213 U/L / ALT: 15 U/L / AST: 24 U/L / GGT: x           RADIOLOGY: - Reviewed and analyzed RT Pig tail cathter  , LVAD HM2, CT scan of abdomen reviewed result noted

## 2021-09-07 NOTE — PROGRESS NOTE ADULT - ASSESSMENT
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 coli UTIs, driveline drainage (1/7/21) and COVID-19, here initially for GIB prolonged hospital course, s/p tracheostomy, acalculous cholecystitis s/p perc dawn. Patient has persistent intermittent abdominal pain. Per CTU team, pt has recently been refusing tube feeds and is being evaluated for chronic mesenteric ischemia vs SMA syndrome.  8/13 bld cxs positive. Consulted for TPN. Prealb 11, prior a1c 5.6, tg 141. 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 Tolerating feeds but with intermittent abd pain. SLP eval recommending NPO status.    -continue tube feeds- Vital 1.5 and advance to goal as tolerated (goal of 65 ml/hr) as per CTU team, no plans to initiate TPN at this time  -suspect uncontrolled hyperthyroidism contributing to metabolic issues ie. cAMP effects GI motility, hypercalcemia, tachycardia; with some clinical improvement, further management as per endo  -f/u RUQ US  -hypomagnesemia- rec replete as per primary  -h/o abdominal pain/vomiting- as above, Zofran prn for nausea; Reglan for gut motility  -anemia- rec iron supp when feasible  -management as per CTU; will remain available as needed    plan discussed with Dr. Soliz, agreeable with above    TPN pager [phone], spectra [phone]  M-F 6A-4P, Weekends and holidays 8A-12P

## 2021-09-07 NOTE — PROGRESS NOTE ADULT - PROBLEM SELECTOR PLAN 3
- Chronic in nature with questionable SMA stenosis on imaging and mesenteric duplex difficult to interpret with continuous flow. Discussed with vascular surgery and will plan for mesenteric angiogram sometime this week   - Continue to advance feeds as tolerates  - Discussed cholecystostomy with GI surgery, unlikely to derive symptomatic benefit since perc dawn tube continues to drain. Repeat RUQ u/s pending to ensure gall bladder is decompressed

## 2021-09-07 NOTE — PROGRESS NOTE ADULT - SUBJECTIVE AND OBJECTIVE BOX
Admitted for: Anemia    Following for: Amiodarone induced thyrotoxicosis     Subjective:       MEDICATIONS  (STANDING):  cholestyramine Powder (Sugar-Free) 4 Gram(s) Oral every 12 hours  dextrose 40% Gel 15 Gram(s) Oral once  dextrose 50% Injectable 25 Gram(s) IV Push once  dextrose 50% Injectable 12.5 Gram(s) IV Push once  glucagon  Injectable 1 milliGRAM(s) IntraMuscular once  heparin   Injectable 5000 Unit(s) SubCutaneous every 8 hours  hydrocortisone sodium succinate Injectable 50 milliGRAM(s) IV Push every 8 hours  insulin lispro (ADMELOG) corrective regimen sliding scale   SubCutaneous every 6 hours  insulin NPH human recombinant 6 Unit(s) SubCutaneous <User Schedule>  lidocaine   4% Patch 1 Patch Transdermal daily  methimazole 20 milliGRAM(s) Oral <User Schedule>  metoclopramide Injectable 10 milliGRAM(s) IV Push every 8 hours  mirtazapine 7.5 milliGRAM(s) Oral daily  multivitamin 1 Tablet(s) Oral daily  pantoprazole  Injectable 40 milliGRAM(s) IV Push every 12 hours  propranolol 40 milliGRAM(s) Oral every 6 hours  sertraline 100 milliGRAM(s) Oral daily  sodium chloride 0.9%. 1000 milliLiter(s) (10 mL/Hr) IV Continuous <Continuous>  thiamine 100 milliGRAM(s) Oral daily    MEDICATIONS  (PRN):  ondansetron Injectable 4 milliGRAM(s) IV Push every 6 hours PRN Nausea and/or Vomiting  simethicone 80 milliGRAM(s) Chew every 8 hours PRN Gas      Allergies    Amiodarone Hydrochloride (Other (Severe))    Intolerances        PHYSICAL EXAM:  VITALS: T(C): 35.9 (09-07-21 @ 08:00)  T(F): 96.6 (09-07-21 @ 08:00), Max: 97.3 (09-06-21 @ 16:00)  HR: 80 (09-07-21 @ 14:00) (70 - 90)  BP: --  RR:  (14 - 43)  SpO2:  (99% - 100%)  Wt(kg): --  GENERAL: NAD  EYES: No proptosis, no lid lag, anicteric  HEENT:  Atraumatic  THYROID: Normal size, no palpable nodules  RESPIRATORY: Clear to auscultation bilaterally  CARDIOVASCULAR: Regular rate and rhythm; No murmurs; no peripheral edema  GI: Soft, nontender, non distended, normal bowel sounds  SKIN: Dry  PSYCH: Alert and oriented x 3, flat affect      A1C with Estimated Average Glucose Result: 5.4 % (08-15-21 @ 13:52)  A1C with Estimated Average Glucose Result: 5.6 % (06-15-21 @ 02:42)      POCT Blood Glucose.: 109 mg/dL (09-07-21 @ 12:59)  POCT Blood Glucose.: 113 mg/dL (09-07-21 @ 06:16)  POCT Blood Glucose.: 123 mg/dL (09-07-21 @ 00:04)  POCT Blood Glucose.: 148 mg/dL (09-06-21 @ 17:19)  POCT Blood Glucose.: 140 mg/dL (09-06-21 @ 11:21)  POCT Blood Glucose.: 104 mg/dL (09-06-21 @ 05:06)  POCT Blood Glucose.: 119 mg/dL (09-05-21 @ 23:13)  POCT Blood Glucose.: 111 mg/dL (09-05-21 @ 16:18)  POCT Blood Glucose.: 87 mg/dL (09-05-21 @ 12:14)  POCT Blood Glucose.: 93 mg/dL (09-05-21 @ 05:21)  POCT Blood Glucose.: 91 mg/dL (09-04-21 @ 23:12)  POCT Blood Glucose.: 126 mg/dL (09-04-21 @ 17:12)      09-07    136  |  101  |  15  ----------------------------<  118<H>  4.0   |  27  |  0.48<L>    EGFR if : 134  EGFR if non : 116    Ca    9.5      09-07  Mg     1.7     09-07  Phos  2.9     09-07    TPro  7.0  /  Alb  2.9<L>  /  TBili  0.3  /  DBili  x   /  AST  82<H>  /  ALT  192<H>  /  AlkPhos  253<H>  09-07      Thyroid Function Tests:  09-07 @ 05:43 TSH <0.01 FreeT4 5.3 T3 126 Anti TPO -- Anti Thyroglobulin Ab -- TSI --  09-04 @ 09:29 TSH <0.01 FreeT4 -- T3 165 Anti TPO -- Anti Thyroglobulin Ab -- TSI --                         Admitted for: Anemia    Following for: Amiodarone induced thyrotoxicosis     Subjective: Patient endorsing abdominal pain today, this has been persistent for many days now.     MEDICATIONS  (STANDING):  cholestyramine Powder (Sugar-Free) 4 Gram(s) Oral every 12 hours  dextrose 40% Gel 15 Gram(s) Oral once  dextrose 50% Injectable 25 Gram(s) IV Push once  dextrose 50% Injectable 12.5 Gram(s) IV Push once  glucagon  Injectable 1 milliGRAM(s) IntraMuscular once  heparin   Injectable 5000 Unit(s) SubCutaneous every 8 hours  hydrocortisone sodium succinate Injectable 50 milliGRAM(s) IV Push every 8 hours  insulin lispro (ADMELOG) corrective regimen sliding scale   SubCutaneous every 6 hours  insulin NPH human recombinant 6 Unit(s) SubCutaneous <User Schedule>  lidocaine   4% Patch 1 Patch Transdermal daily  methimazole 20 milliGRAM(s) Oral <User Schedule>  metoclopramide Injectable 10 milliGRAM(s) IV Push every 8 hours  mirtazapine 7.5 milliGRAM(s) Oral daily  multivitamin 1 Tablet(s) Oral daily  pantoprazole  Injectable 40 milliGRAM(s) IV Push every 12 hours  propranolol 40 milliGRAM(s) Oral every 6 hours  sertraline 100 milliGRAM(s) Oral daily  sodium chloride 0.9%. 1000 milliLiter(s) (10 mL/Hr) IV Continuous <Continuous>  thiamine 100 milliGRAM(s) Oral daily    MEDICATIONS  (PRN):  ondansetron Injectable 4 milliGRAM(s) IV Push every 6 hours PRN Nausea and/or Vomiting  simethicone 80 milliGRAM(s) Chew every 8 hours PRN Gas      Allergies    Amiodarone Hydrochloride (Other (Severe))    Intolerances        PHYSICAL EXAM:  VITALS: T(C): 35.9 (09-07-21 @ 08:00)  T(F): 96.6 (09-07-21 @ 08:00), Max: 97.3 (09-06-21 @ 16:00)  HR: 80 (09-07-21 @ 14:00) (70 - 90)  BP: --  RR:  (14 - 43)  SpO2:  (99% - 100%)  Wt(kg): --  GENERAL: NAD, NGT in place  EYES: No proptosis, no lid lag, anicteric  HEENT:  Atraumatic  THYROID: Normal size, no palpable nodules  RESPIRATORY: Clear to auscultation bilaterally  CARDIOVASCULAR: Regular rate and rhythm; No murmurs; no peripheral edema  GI: Soft, tender to palpation   SKIN: Dry  PSYCH: Alert and oriented x 3, flat affect      A1C with Estimated Average Glucose Result: 5.4 % (08-15-21 @ 13:52)  A1C with Estimated Average Glucose Result: 5.6 % (06-15-21 @ 02:42)      POCT Blood Glucose.: 109 mg/dL (09-07-21 @ 12:59)  POCT Blood Glucose.: 113 mg/dL (09-07-21 @ 06:16)  POCT Blood Glucose.: 123 mg/dL (09-07-21 @ 00:04)  POCT Blood Glucose.: 148 mg/dL (09-06-21 @ 17:19)  POCT Blood Glucose.: 140 mg/dL (09-06-21 @ 11:21)  POCT Blood Glucose.: 104 mg/dL (09-06-21 @ 05:06)  POCT Blood Glucose.: 119 mg/dL (09-05-21 @ 23:13)  POCT Blood Glucose.: 111 mg/dL (09-05-21 @ 16:18)  POCT Blood Glucose.: 87 mg/dL (09-05-21 @ 12:14)  POCT Blood Glucose.: 93 mg/dL (09-05-21 @ 05:21)  POCT Blood Glucose.: 91 mg/dL (09-04-21 @ 23:12)  POCT Blood Glucose.: 126 mg/dL (09-04-21 @ 17:12)      09-07    136  |  101  |  15  ----------------------------<  118<H>  4.0   |  27  |  0.48<L>    EGFR if : 134  EGFR if non : 116    Ca    9.5      09-07  Mg     1.7     09-07  Phos  2.9     09-07    TPro  7.0  /  Alb  2.9<L>  /  TBili  0.3  /  DBili  x   /  AST  82<H>  /  ALT  192<H>  /  AlkPhos  253<H>  09-07      Thyroid Function Tests:  09-07 @ 05:43 TSH <0.01 FreeT4 5.3 T3 126 Anti TPO -- Anti Thyroglobulin Ab -- TSI --  09-04 @ 09:29 TSH <0.01 FreeT4 -- T3 165 Anti TPO -- Anti Thyroglobulin Ab -- TSI --                         Admitted for: Anemia    Following for: Amiodarone induced thyrotoxicosis     Subjective: Patient endorsing abdominal pain today, this has been persistent for many days now. Patient was having episodes of vomiting noted in the past couple days, but he has not vomited today or yesterday. TF were held and have been slowly uptitrated by 5 cc daily to goal rate of 65 cc. Currently at 50 cc/hr, tolerating well. Has been receiving Methimazole daily.     MEDICATIONS  (STANDING):  cholestyramine Powder (Sugar-Free) 4 Gram(s) Oral every 12 hours  dextrose 40% Gel 15 Gram(s) Oral once  dextrose 50% Injectable 25 Gram(s) IV Push once  dextrose 50% Injectable 12.5 Gram(s) IV Push once  glucagon  Injectable 1 milliGRAM(s) IntraMuscular once  heparin   Injectable 5000 Unit(s) SubCutaneous every 8 hours  hydrocortisone sodium succinate Injectable 50 milliGRAM(s) IV Push every 8 hours  insulin lispro (ADMELOG) corrective regimen sliding scale   SubCutaneous every 6 hours  insulin NPH human recombinant 6 Unit(s) SubCutaneous <User Schedule>  lidocaine   4% Patch 1 Patch Transdermal daily  methimazole 20 milliGRAM(s) Oral <User Schedule>  metoclopramide Injectable 10 milliGRAM(s) IV Push every 8 hours  mirtazapine 7.5 milliGRAM(s) Oral daily  multivitamin 1 Tablet(s) Oral daily  pantoprazole  Injectable 40 milliGRAM(s) IV Push every 12 hours  propranolol 40 milliGRAM(s) Oral every 6 hours  sertraline 100 milliGRAM(s) Oral daily  sodium chloride 0.9%. 1000 milliLiter(s) (10 mL/Hr) IV Continuous <Continuous>  thiamine 100 milliGRAM(s) Oral daily    MEDICATIONS  (PRN):  ondansetron Injectable 4 milliGRAM(s) IV Push every 6 hours PRN Nausea and/or Vomiting  simethicone 80 milliGRAM(s) Chew every 8 hours PRN Gas      Allergies    Amiodarone Hydrochloride (Other (Severe))    Intolerances        PHYSICAL EXAM:  VITALS: T(C): 35.9 (09-07-21 @ 08:00)  T(F): 96.6 (09-07-21 @ 08:00), Max: 97.3 (09-06-21 @ 16:00)  HR: 80 (09-07-21 @ 14:00) (70 - 90)  BP: --  RR:  (14 - 43)  SpO2:  (99% - 100%)  Wt(kg): --  GENERAL: NAD, NGT in place  EYES: No proptosis, no lid lag, anicteric  HEENT:  Atraumatic  THYROID: Normal size, no palpable nodules  RESPIRATORY: Clear to auscultation bilaterally  CARDIOVASCULAR: Regular rate and rhythm; No murmurs; no peripheral edema  GI: Soft, tender to palpation   SKIN: Dry  PSYCH: Alert and oriented x 3, flat affect      A1C with Estimated Average Glucose Result: 5.4 % (08-15-21 @ 13:52)  A1C with Estimated Average Glucose Result: 5.6 % (06-15-21 @ 02:42)      POCT Blood Glucose.: 109 mg/dL (09-07-21 @ 12:59)  POCT Blood Glucose.: 113 mg/dL (09-07-21 @ 06:16)  POCT Blood Glucose.: 123 mg/dL (09-07-21 @ 00:04)  POCT Blood Glucose.: 148 mg/dL (09-06-21 @ 17:19)  POCT Blood Glucose.: 140 mg/dL (09-06-21 @ 11:21)  POCT Blood Glucose.: 104 mg/dL (09-06-21 @ 05:06)  POCT Blood Glucose.: 119 mg/dL (09-05-21 @ 23:13)  POCT Blood Glucose.: 111 mg/dL (09-05-21 @ 16:18)  POCT Blood Glucose.: 87 mg/dL (09-05-21 @ 12:14)  POCT Blood Glucose.: 93 mg/dL (09-05-21 @ 05:21)  POCT Blood Glucose.: 91 mg/dL (09-04-21 @ 23:12)  POCT Blood Glucose.: 126 mg/dL (09-04-21 @ 17:12)      09-07    136  |  101  |  15  ----------------------------<  118<H>  4.0   |  27  |  0.48<L>    EGFR if : 134  EGFR if non : 116    Ca    9.5      09-07  Mg     1.7     09-07  Phos  2.9     09-07    TPro  7.0  /  Alb  2.9<L>  /  TBili  0.3  /  DBili  x   /  AST  82<H>  /  ALT  192<H>  /  AlkPhos  253<H>  09-07      Thyroid Function Tests:  09-07 @ 05:43 TSH <0.01 FreeT4 5.3 T3 126 Anti TPO -- Anti Thyroglobulin Ab -- TSI --  09-04 @ 09:29 TSH <0.01 FreeT4 -- T3 165 Anti TPO -- Anti Thyroglobulin Ab -- TSI --                         Admitted for: Anemia    Following for: Amiodarone/contrast induced thyrotoxicosis     Subjective: Patient endorsing abdominal pain today, this has been persistent for many days now. Patient was having episodes of vomiting noted in the past couple days, but he has not vomited today or yesterday. TF were held and have been slowly uptitrated by 5 cc daily to goal rate of 65 cc. Currently at 50 cc/hr, tolerating well. Has been receiving Methimazole daily.     MEDICATIONS  (STANDING):  cholestyramine Powder (Sugar-Free) 4 Gram(s) Oral every 12 hours  dextrose 40% Gel 15 Gram(s) Oral once  dextrose 50% Injectable 25 Gram(s) IV Push once  dextrose 50% Injectable 12.5 Gram(s) IV Push once  glucagon  Injectable 1 milliGRAM(s) IntraMuscular once  heparin   Injectable 5000 Unit(s) SubCutaneous every 8 hours  hydrocortisone sodium succinate Injectable 50 milliGRAM(s) IV Push every 8 hours  insulin lispro (ADMELOG) corrective regimen sliding scale   SubCutaneous every 6 hours  insulin NPH human recombinant 6 Unit(s) SubCutaneous <User Schedule>  lidocaine   4% Patch 1 Patch Transdermal daily  methimazole 20 milliGRAM(s) Oral <User Schedule>  metoclopramide Injectable 10 milliGRAM(s) IV Push every 8 hours  mirtazapine 7.5 milliGRAM(s) Oral daily  multivitamin 1 Tablet(s) Oral daily  pantoprazole  Injectable 40 milliGRAM(s) IV Push every 12 hours  propranolol 40 milliGRAM(s) Oral every 6 hours  sertraline 100 milliGRAM(s) Oral daily  sodium chloride 0.9%. 1000 milliLiter(s) (10 mL/Hr) IV Continuous <Continuous>  thiamine 100 milliGRAM(s) Oral daily    MEDICATIONS  (PRN):  ondansetron Injectable 4 milliGRAM(s) IV Push every 6 hours PRN Nausea and/or Vomiting  simethicone 80 milliGRAM(s) Chew every 8 hours PRN Gas      Allergies  Amiodarone Hydrochloride (Other (Severe))          PHYSICAL EXAM:  VITALS: T(C): 35.9 (09-07-21 @ 08:00)  T(F): 96.6 (09-07-21 @ 08:00), Max: 97.3 (09-06-21 @ 16:00)  HR: 80 (09-07-21 @ 14:00) (70 - 90)  BP: --  RR:  (14 - 43)  SpO2:  (99% - 100%)  Wt(kg): --  GENERAL: NAD, NGT in place  RESPIRATORY: Clear to auscultation bilaterally  CARDIOVASCULAR: Regular rate and rhythm; No murmurs; no peripheral edema  GI: Soft, tender to palpation   PSYCH: Alert and oriented x 3, flat affect      A1C with Estimated Average Glucose Result: 5.4 % (08-15-21 @ 13:52)  A1C with Estimated Average Glucose Result: 5.6 % (06-15-21 @ 02:42)      POCT Blood Glucose.: 109 mg/dL (09-07-21 @ 12:59)  POCT Blood Glucose.: 113 mg/dL (09-07-21 @ 06:16)  POCT Blood Glucose.: 123 mg/dL (09-07-21 @ 00:04)  POCT Blood Glucose.: 148 mg/dL (09-06-21 @ 17:19)  POCT Blood Glucose.: 140 mg/dL (09-06-21 @ 11:21)  POCT Blood Glucose.: 104 mg/dL (09-06-21 @ 05:06)  POCT Blood Glucose.: 119 mg/dL (09-05-21 @ 23:13)  POCT Blood Glucose.: 111 mg/dL (09-05-21 @ 16:18)  POCT Blood Glucose.: 87 mg/dL (09-05-21 @ 12:14)  POCT Blood Glucose.: 93 mg/dL (09-05-21 @ 05:21)  POCT Blood Glucose.: 91 mg/dL (09-04-21 @ 23:12)  POCT Blood Glucose.: 126 mg/dL (09-04-21 @ 17:12)      09-07    136  |  101  |  15  ----------------------------<  118<H>  4.0   |  27  |  0.48<L>    EGFR if : 134  EGFR if non : 116    Ca    9.5      09-07  Mg     1.7     09-07  Phos  2.9     09-07    TPro  7.0  /  Alb  2.9<L>  /  TBili  0.3  /  DBili  x   /  AST  82<H>  /  ALT  192<H>  /  AlkPhos  253<H>  09-07      Thyroid Function Tests:  09-07 @ 05:43 TSH <0.01 FreeT4 5.3 T3 126  09-04 @ 09:29 TSH <0.01

## 2021-09-07 NOTE — PROGRESS NOTE ADULT - ATTENDING COMMENTS
Fellow to reach out to radiology to look at CT Chest for thyroid nodules as the read did not mention it. Also his trach prevents a good thyroid exam. Taper meds as above.  Sally Beatty MD  Endocrinology Attending  Mondays and Tuesdays 9am-6pm: [phone] (pager)  Other days, night and weekend: [phone]

## 2021-09-07 NOTE — PROGRESS NOTE ADULT - SUBJECTIVE AND OBJECTIVE BOX
Subjective: Patient seen and examined resting in bed. ON no issues.     Medications:  cholestyramine Powder (Sugar-Free) 4 Gram(s) Oral every 12 hours  dextrose 40% Gel 15 Gram(s) Oral once  dextrose 50% Injectable 25 Gram(s) IV Push once  dextrose 50% Injectable 12.5 Gram(s) IV Push once  glucagon  Injectable 1 milliGRAM(s) IntraMuscular once  heparin   Injectable 5000 Unit(s) SubCutaneous every 8 hours  hydrocortisone sodium succinate Injectable 50 milliGRAM(s) IV Push every 8 hours  insulin lispro (ADMELOG) corrective regimen sliding scale   SubCutaneous every 6 hours  insulin NPH human recombinant 6 Unit(s) SubCutaneous <User Schedule>  lidocaine   4% Patch 1 Patch Transdermal daily  methimazole 20 milliGRAM(s) Oral <User Schedule>  metoclopramide Injectable 10 milliGRAM(s) IV Push every 8 hours  mirtazapine 7.5 milliGRAM(s) Oral daily  multivitamin 1 Tablet(s) Oral daily  ondansetron Injectable 4 milliGRAM(s) IV Push every 6 hours PRN  pantoprazole  Injectable 40 milliGRAM(s) IV Push every 12 hours  propranolol 40 milliGRAM(s) Oral every 6 hours  sertraline 100 milliGRAM(s) Oral daily  simethicone 80 milliGRAM(s) Chew every 8 hours PRN  sodium chloride 0.9%. 1000 milliLiter(s) IV Continuous <Continuous>  thiamine 100 milliGRAM(s) Oral daily    ICU Vital Signs Last 24 Hrs  T(C): 35.9 (07 Sep 2021 08:00), Max: 36.3 (06 Sep 2021 16:00)  T(F): 96.6 (07 Sep 2021 08:00), Max: 97.3 (06 Sep 2021 16:00)  HR: 77 (07 Sep 2021 13:00) (70 - 90)  BP: --  BP(mean): --  ABP: 90/67 (07 Sep 2021 13:00) (71/58 - 103/86)  ABP(mean): 75 (07 Sep 2021 13:00) (63 - 94)  RR: 20 (07 Sep 2021 13:00) (14 - 43)  SpO2: 100% (07 Sep 2021 13:00) (99% - 100%)      Weight in k.4 ( @ 00:00)    I&O's Summary    06 Sep 2021 07:01  -  07 Sep 2021 07:00  --------------------------------------------------------  IN: 2155 mL / OUT: 1201 mL / NET: 954 mL    07 Sep 2021 07:01  -  07 Sep 2021 13:51  --------------------------------------------------------  IN: 425 mL / OUT: 150 mL / NET: 275 mL        Physical Exam  General: No distress. Comfortable.  HEENT: EOM intact.  Neck: Neck supple. JVP not elevated. No masses  Chest: +trach   CV: +VAD hum  Abdomen: Soft, RUQ tenderness by perc dawn site. Dawn drain with bilious fluid. Positive BS throughout.  Skin: No rashes or skin breakdown  Neurology: Alert and oriented times three.     LVAD Interrogation  VAD TYPE HM 2  Speed 9200  Flow 5.9  Power 5.8  PI 4.8  Assessment of driveline exit site: driveline dressing c/d/i  Programming changes: no changes made    Labs:                        7.5    8.44  )-----------( 216      ( 07 Sep 2021 00:20 )             24.5         136  |  101  |  15  ----------------------------<  118<H>  4.0   |  27  |  0.48<L>    Ca    9.5      07 Sep 2021 00:18  Phos  2.9       Mg     1.7         TPro  7.0  /  Alb  2.9<L>  /  TBili  0.3  /  DBili  x   /  AST  82<H>  /  ALT  192<H>  /  AlkPhos  253<H>        Lactate Dehydrogenase, Serum: 189 U/L ( @ 00:18)  Lactate Dehydrogenase, Serum: 205 U/L ( @ 00:25)  Lactate Dehydrogenase, Serum: 184 U/L ( @ 01:48)

## 2021-09-07 NOTE — PROGRESS NOTE ADULT - ASSESSMENT
67 YO M with a history of stage D NICM s/p HM2 on 9/2017 as DT (due to severe PAD) with TV ring, prior COVID-19 infection 4/2020, recurrent syncope post LVAD s/p ILR, and chronic abdominal pain with prior negative workup who was admitted 6/14/21 with symptomatic anemia with Hgb 4.5 in setting of INR 8.8 without hemodynamic instability and has since had a protracted hospitalization. He was transfused and underwent VCE which showed active bleeding in the mid small bowel but subsequent enteroscopy 6/15 did not reveal any active bleeding. He acutely decompensated after procedure with fever/hypertension, low flow alarms, and pulmonary infiltrate with hypoxia requiring intubation from probable aspiration PNA. He was unable to extubated and has since undergone tracheostomy but tolerating persistent trach collar and nearing ability for decannulation. 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 Short term goal is maintenance of adequate nutrition and eventual trach decannulation.     Discussed with vascular surgery team and plan is to proceed with angiogram this week to assess for SMA stenosis, awaiting to hear back on date.

## 2021-09-07 NOTE — PROGRESS NOTE ADULT - SUBJECTIVE AND OBJECTIVE BOX
RICKY HAMPTON  MRN-[id number]  Patient is a 66y old  Male who presents with a chief complaint of Anemia, Supratherapeutic INR, Dark Stools (07 Sep 2021 08:24)    HPI:  65M PMH ACC/AHA stage D HF due to NICM HM2 LVAD , TV annuloplasty ring 17 as destination therapy due to severe peripheral artery disease with significant stenosis  SIADH, Depression, CKD-3 with hyperkalemia, past E. coli UTIs, driveline drainage (21) and COVID-19 (back in 2020)  He was recently seen in clinic where he complained of abdominal pain and dark stools w constipation back in May. He presents to [facility] ER today weakness and fatigue, moderate and + Black stools for three days, on coumadin secondary to warfarin use in the setting of an LVAD. Patient has required transfusions for GIB in the past. Mostly recently back in 2021 pt had anemia with dark stools. No interventions was done at that time. However Last Endoscopy was done in 2020 (negative). Today labs show patient is anemic with H/H of 4.5/16.3,. INR is 8.84 MAP in the 90s, Temp 35.1. He denies any chest pain, shortness of breath, dizziness, abd pain, nausea or vomiting.       (2021 16:57)      Surgery/Hospital Course:   admit for melena w/ anemia, INR 8.84   6/15 Capsul study (+) for small bowel bleed, balloon endoscopy (old blood in prox ileum); post EGD - septic w/ L opacity, re-intubated for concern for aspiration, TTE (Mod MR, decrease biV w/ interventricular septum boweing towards R)   bronch    +C Diff    CT C/A/P: Fluid filled colon which may be 2/2 rapid transit. Small bilateral pleffs with associates. Compressive atelectasis New ISABELLE & LLL  parenchymal opacities, suspicious for pneumonia. 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    #8 Shiley trach at bedside    LVAD speed increased to 9200   Bronch   TC since . Patient transferred to SDU.    INR today 2.64.  H&H 7.3/24 this AM.  Will repeat CBC at noon, and will send stool guaiac Patient with persistent abdominal tenderness, rate of tube feeds decreased.  No nausea/vomiting.     INR today 2.4. H&H 9.1/28.6 low flow overnight /N&V, refusing Tube feeds on D5 1/2 normal  @50 cc/hr   INR 2.69  H&H 7.7/.1 refusing Tube feeds on D5 1/2 normal  @50 cc/hr. This am + BM Melena Dr Oneill HF  aware- PRBC x1  GI team consulted -  NPO  plan on study in am-  D/w Dr Cadet Patient  to return to CTU for further management; 1PRBCS    Post op INR 2.2 today.  No bleeding. BC + for SM.  Pt is hypotensive requiring pressor and inotropic support.  ID follow up today on Cefepime will follow.   R PTC for PTX    CT C/A/P: sub q emphysema in R chest wall, GGO RUL, small ascites CTH negative; Abd US: GB thickening, pericholecystic fluid     Perchole drain in place continues to drain total output overnight 133.  Fever today 38.8    duplex LE negative    Patient with persistent abdominal pain, refusing tube feeds and medications, Psych consulted   CTA A/P ordered to r/o mesenteric ischemia 2/2 persistent anorexia, nausea, vomiting. Revealed:  Evaluation of the mesenteric vessels is limited by streak artifact from LVAD. There appears to be severe stenosis of the proximal SMA; abdominal mesenteric doppler is recommended for further evaluation. 2.  No small bowel findings to suggest acute mesenteric ischemia. 3.  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  8/15: Cultures resulted BC 1/2 +GPC in clusters, SC enterobacter; mesenteric duplex: borderline stenosis of proximal SMA  : CT C/A/P noncon: Nondular opacities in R lung apex w/cavitation, abd nl  :  Continue current care, treatment of thyrotoxicosis with medications as per endocrine, d/c ABX as per team.     Today: Plan for PPT. Maintain trach collar. Dc'd 30cc normosol. Upped tube feed to 50cc. Continue PT as tolerated.     REVIEW OF SYSTEMS:  Patient speaks over trach   Gen: No fever, leg pain improved  EYES/ENT: No visual changes;  No vertigo or throat pain   NECK: No pain   RES:  No shortness of breath or Cough  CARD: No chest pain   GI: +abdominal pain, denies nausea at this time  : No dysuria  NEURO: No weakness; + lower extremity pain, improved today  SKIN: No itching, rashes     ICU Vital Signs Last 24 Hrs  T(C): 35.9 (07 Sep 2021 08:00), Max: 36.3 (06 Sep 2021 16:00)  T(F): 96.6 (07 Sep 2021 08:00), Max: 97.3 (06 Sep 2021 16:00)  HR: 70 (07 Sep 2021 12:00) (70 - 90)  BP: --  BP(mean): --  ABP: 76/67 (07 Sep 2021 12:00) (71/58 - 103/86)  ABP(mean): 71 (07 Sep 2021 12:00) (63 - 94)  RR: 16 (07 Sep 2021 12:00) (14 - 43)  SpO2: 100% (07 Sep 2021 12:00) (99% - 100%)      Physical Exam:  Gen:  Awake and alert, Sitting in chair in NAD.  Psych: Mood greatly improved but waxes and wanes, more interactive and participating in care. Would like to be more included in bedside rounding.  CNS:  intact, nonfocal, responds to all commands  Neck: no JVD, +TC   RES : course breath sounds, no wheezing, moderate tan secretions able to cough most up on own         CVS: +LVAD hum   Abd: Soft, RUQ tenderness by perc sybil site. Sybil drain with bilious fluid. Positive BS throughout.  Skin: No rash  Ext:  no edema    ============================I/O===========================   I&O's Detail    06 Sep 2021 07:01  -  07 Sep 2021 07:00  --------------------------------------------------------  IN:    Enteral Tube Flush: 370 mL    Miscellaneous Tube Feedin mL    multiple electrolytes Injection Type 1.: 720 mL  Total IN: 2155 mL    OUT:    Drain (mL): 500 mL    Emesis (mL): 1 mL    Voided (mL): 700 mL  Total OUT: 1201 mL    Total NET: 954 mL      07 Sep 2021 07:01  -  07 Sep 2021 12:29  --------------------------------------------------------  IN:    Miscellaneous Tube Feedin mL    multiple electrolytes Injection Type 1.: 60 mL    sodium chloride 0.9%: 30 mL  Total IN: 325 mL    OUT:    Voided (mL): 150 mL  Total OUT: 150 mL    Total NET: 175 mL        ============================ LABS =========================                        7.5    8.44  )-----------( 216      ( 07 Sep 2021 00:20 )             24.5         136  |  101  |  15  ----------------------------<  118<H>  4.0   |  27  |  0.48<L>    Ca    9.5      07 Sep 2021 00:18  Phos  2.9       Mg     1.7         TPro  7.0  /  Alb  2.9<L>  /  TBili  0.3  /  DBili  x   /  AST  82<H>  /  ALT  192<H>  /  AlkPhos  253<H>      LIVER FUNCTIONS - ( 07 Sep 2021 00:18 )  Alb: 2.9 g/dL / Pro: 7.0 g/dL / ALK PHOS: 253 U/L / ALT: 192 U/L / AST: 82 U/L / GGT: x             ABG - ( 07 Sep 2021 00:09 )  pH, Arterial: 7.40  pH, Blood: x     /  pCO2: 51    /  pO2: 160   / HCO3: 32    / Base Excess: 6.0   /  SaO2: 99.5                ======================Micro/Rad/Cardio=================  Culture: Reviewed   CXR: Reviewed  Echo:Reviewed  ======================================================  PAST MEDICAL & SURGICAL HISTORY:  CHF (congestive heart failure)    CAD (coronary artery disease)    Depression    Pleural effusion    History of 2019 novel coronavirus disease (COVID-19)  2020    Hemorrhoids    Bleeding hemorrhoids    Peripheral arterial disease    Claudication    BPH with urinary obstruction    ACC/AHA stage D systolic heart failure    Anticoagulation goal of INR 2.0 to 2.5    Falls    Clavicle fracture    CKD (chronic kidney disease), stage III    Iron deficiency anemia    H/O epistaxis    Vertigo    GI bleed    S/P TVR (tricuspid valve repair)    S/P ventricular assist device    S/P endoscopy      ====================ASSESSMENT ==============  67 YO M with a history of stage D NICM s/p HM2 on 2017 as DT (due to severe PAD) with TV ring, prior COVID-19 infection 2020, recurrent syncope post LVAD s/p ILR, and chronic abdominal pain with prior negative workup who was admitted with symptomatic anemia with Hgb 4.5 in setting of INR 8.8. Hospital course c/b cardiogenic shock, hemodynamic instability, acute hypoxemic respiratory failure s/p tracheostomy, GI bleed S/P Enteroscopy , Anemia, in setting of melena, chronic kidney disease, stress hyperglycemia, C.diff positive on , hypovolemic shock, septic shock, Enterobacter pneumonia , GB thickening/percholecystic s/p perc choley by IT , SMA stenosis    Plan:  ====================== NEUROLOGY=====================  Continue close monitoring of neuro status    Deconditioned, PT/Ambulate as tolerated  Mitazapine/ sertraline for depresion   Dc'd 30cc normosol    metoclopramide Injectable 10 milliGRAM(s) IV Push every 8 hours  mirtazapine 7.5 milliGRAM(s) Oral daily  sertraline 100 milliGRAM(s) Oral daily    ==================== RESPIRATORY======================  Acute hypoxemic respiratory failure s/p #8 shiley trach on , continue TC as tolerated (TC since )  Continue close monitoring of respiratory rate and breathing pattern, following of ABG’s with A-line monitoring, continuous pulse oximetry monitoring.   Moderate secretions, continue suctioning prn, pulmonary toileting.     ====================CARDIOVASCULAR==================  Stage D Nonischemic Cardiomyopathy, Status Post HM2 on 2017; LVAD settings 9200, flow 4.8  TTE : reveals at least moderate MR, mild AI, severe global LV syst dysfxn, dec RV fxn   Continue invasive hemodynamic monitoring     For rate control of HR 2/2 Hyperthyroidism, titrate as tolerated per Endo    propranolol 40 milliGRAM(s) Oral every 6 hours    ===================HEMATOLOGIC/ONC ===================  Acute blood loss anemia, monitor H&H/Plts    Holding coumadin and aspirin indefinitely given recurrent GI bleeding. Continue to monitor LDH daily.    VTE prophylaxis, heparin   Injectable 5000 Unit(s) SubCutaneous every 8 hours    ===================== RENAL =========================  Continue to monitor I/Os, BUN/Creatinine, and urine output.   Euvolemic, Even fluid balance, currently off diuretics.    Replete lytes PRN. Keep K> 4 and Mg >2.     ==================== GASTROINTESTINAL===================  S/p cholecystostomy tube placed on  with IR   CTA A/P : Evaluation of the mesenteric vessels is limited by streak artifact from LVAD. There appears to be severe stenosis of the proximal SMA; abdominal mesenteric doppler is recommended for further evaluation. 2.  No small bowel findings to suggest acute mesenteric ischemia. 3.  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  Mesenteric duplex on 8/15 borderline stenosis of proximal SMA. ?Mesenteric angiogram with vascular this coming week.  For repeat JASMIN US to reassess gallbladder with perc sybil tube  Vital 1.5 shantelle feeds - Continue increasing tube feeds 5cc/day for goal of 65cc/hr as tolerated.   CT Abd/Pel on  without acute process   Reglan for gut motility  Zofran PRN nausea/vomiting   [place] PRN for nausea/vomiting     multivitamin 1 Tablet(s) Oral daily  GI prophylaxis, pantoprazole  Injectable 40 milliGRAM(s) IV Push every 12 hours  simethicone 80 milliGRAM(s) Chew every 8 hours PRN Gas  sodium chloride 0.9%. 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 Will initiate today.      - F/u thyroid studies and d/w endocrinology      - Management with hydrocortisone and methimazole     cholestyramine Powder (Sugar-Free) 4 Gram(s) Oral every 12 hours  dextrose 40% Gel 15 Gram(s) Oral once  dextrose 50% Injectable 25 Gram(s) IV Push once  dextrose 50% Injectable 12.5 Gram(s) IV Push once  glucagon  Injectable 1 milliGRAM(s) IntraMuscular once  hydrocortisone sodium succinate Injectable 50 milliGRAM(s) IV Push every 8 hours  insulin lispro (ADMELOG) corrective regimen sliding scale   SubCutaneous every 6 hours  insulin NPH human recombinant 6 Unit(s) SubCutaneous <User Schedule>  methimazole 20 milliGRAM(s) Oral <User Schedule>    ========================INFECTIOUS DISEASE================  Afebrile, WBC within normal limits   Continue trending WBC and monitoring fever curve   Culture from sybil drain on : NGTD  ID following, MD Prater, appreciate recommendations    Patient requires continuous monitoring with bedside rhythm monitoring, pulse ox monitoring, and intermittent blood gas analysis. Care plan discussed with ICU care team. Patient remained critical and at risk for life threatening decompensation.     By signing my name below, I, Aparna Saleh, attest that this documentation has been prepared under the direction and in the presence of CLAUDIA Mejia   Electronically signed: Cesia Villegas, 21 @ 12:29    I, Go Sellers, personally performed the services described in this documentation. all medical record entries made by the scribe were at my direction and in my presence. I have reviewed the chart and agree that the record reflects my personal performance and is accurate and complete  Electronically signed: CLAUDIA Mejia        RICKY HAMPTON  MRN-[id number]  Patient is a 66y old  Male who presents with a chief complaint of Anemia, Supratherapeutic INR, Dark Stools (07 Sep 2021 08:24)    HPI:  65M PMH ACC/AHA stage D HF due to NICM HM2 LVAD , TV annuloplasty ring 17 as destination therapy due to severe peripheral artery disease with significant stenosis  SIADH, Depression, CKD-3 with hyperkalemia, past E. coli UTIs, driveline drainage (21) and COVID-19 (back in 2020)  He was recently seen in clinic where he complained of abdominal pain and dark stools w constipation back in May. He presents to [facility] ER today weakness and fatigue, moderate and + Black stools for three days, on coumadin secondary to warfarin use in the setting of an LVAD. Patient has required transfusions for GIB in the past. Mostly recently back in 2021 pt had anemia with dark stools. No interventions was done at that time. However Last Endoscopy was done in 2020 (negative). Today labs show patient is anemic with H/H of 4.5/16.3,. INR is 8.84 MAP in the 90s, Temp 35.1. He denies any chest pain, shortness of breath, dizziness, abd pain, nausea or vomiting.     (2021 16:57)    Surgery/Hospital Course:   admit for melena w/ anemia, INR 8.84   6/15 Capsul study (+) for small bowel bleed, balloon endoscopy (old blood in prox ileum); post EGD - septic w/ L opacity, re-intubated for concern for aspiration, TTE (Mod MR, decrease biV w/ interventricular septum boweing towards R)   bronch    +C Diff    CT C/A/P: Fluid filled colon which may be 2/2 rapid transit. Small bilateral pleffs with associates. Compressive atelectasis New ISABELLE & LLL  parenchymal opacities, suspicious for pneumonia. 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    #8 Shiley trach at bedside    LVAD speed increased to 9200   Bronch   TC since . Patient transferred to SDU.    INR today 2.64.  H&H 7.3/24 this AM.  Will repeat CBC at noon, and will send stool guaiac Patient with persistent abdominal tenderness, rate of tube feeds decreased.  No nausea/vomiting.     INR today 2.4. H&H 9.1/28.6 low flow overnight /N&V, refusing Tube feeds on D5 1/2 normal  @50 cc/hr   INR 2.69  H&H 7.7/.1 refusing Tube feeds on D5 1/2 normal  @50 cc/hr. This am + BM Melena Dr Oneill HF  aware- PRBC x1  GI team consulted -  NPO  plan on study in am-  D/w Dr Cadet Patient  to return to CTU for further management; 1PRBCS    Post op INR 2.2 today.  No bleeding. BC + for SM.  Pt is hypotensive requiring pressor and inotropic support.  ID follow up today on Cefepime will follow.   R PTC for PTX    CT C/A/P: sub q emphysema in R chest wall, GGO RUL, small ascites CTH negative; Abd US: GB thickening, pericholecystic fluid     Perchole drain in place continues to drain total output overnight 133.  Fever today 38.8    duplex LE negative    Patient with persistent abdominal pain, refusing tube feeds and medications, Psych consulted   CTA A/P ordered to r/o mesenteric ischemia 2/2 persistent anorexia, nausea, vomiting. Revealed:  Evaluation of the mesenteric vessels is limited by streak artifact from LVAD. There appears to be severe stenosis of the proximal SMA; abdominal mesenteric doppler is recommended for further evaluation. 2.  No small bowel findings to suggest acute mesenteric ischemia. 3.  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  8/15: Cultures resulted BC 1/2 +GPC in clusters, SC enterobacter; mesenteric duplex: borderline stenosis of proximal SMA  : CT C/A/P noncon: Nondular opacities in R lung apex w/cavitation, abd nl  :  Continue current care, treatment of thyrotoxicosis with medications as per endocrine, d/c ABX as per team.     Today: Maintain trach collar. D/c'ed 30cc/hr Normosol since pt is on close to goal tube feeds. Tube feeds increased to TF 50cc/hr. Continue PT as tolerated.    REVIEW OF SYSTEMS:  Patient speaks over trach   Gen: No fever, leg pain improved  EYES/ENT: No visual changes;  No vertigo or throat pain   NECK: No pain   RES:  No shortness of breath or Cough  CARD: No chest pain   GI: +abdominal pain, denies nausea at this time  : No dysuria  NEURO: No weakness; + lower extremity pain, improved today  SKIN: No itching, rashes     ICU Vital Signs Last 24 Hrs  T(C): 35.9 (07 Sep 2021 08:00), Max: 36.3 (06 Sep 2021 16:00)  T(F): 96.6 (07 Sep 2021 08:00), Max: 97.3 (06 Sep 2021 16:00)  HR: 70 (07 Sep 2021 12:00) (70 - 90)  BP(mean): 81  ABP: 76/67 (07 Sep 2021 12:00) (71/58 - 103/86)  ABP(mean): 71 (07 Sep 2021 12:00) (63 - 94)  RR: 16 (07 Sep 2021 12:00) (14 - 43)  SpO2: 100% (07 Sep 2021 12:00) (99% - 100%)    Physical Exam:  Gen:  Awake and alert, Sitting in chair in NAD.  Psych: Mood greatly improved but waxes and wanes, more interactive and participating in care. Would like to be more included in bedside rounding.  CNS:  intact, nonfocal, responds to all commands  Neck: no JVD, +TC   RES : course breath sounds, no wheezing, moderate tan secretions able to cough most up on own         CVS: +LVAD hum   Abd: Soft, RUQ tenderness by perc sybil site. Sybil drain with bilious fluid. Positive BS throughout.  Skin: No rash  Ext:  no edema    ============================I/O===========================   I&O's Detail    06 Sep 2021 07:01  -  07 Sep 2021 07:00  --------------------------------------------------------  IN:    Enteral Tube Flush: 370 mL    Miscellaneous Tube Feedin mL    multiple electrolytes Injection Type 1.: 720 mL  Total IN: 2155 mL    OUT:    Drain (mL): 500 mL    Emesis (mL): 1 mL    Voided (mL): 700 mL  Total OUT: 1201 mL    Total NET: 954 mL    07 Sep 2021 07:01  -  07 Sep 2021 12:29  --------------------------------------------------------  IN:    Miscellaneous Tube Feedin mL    multiple electrolytes Injection Type 1.: 60 mL    sodium chloride 0.9%: 30 mL  Total IN: 325 mL    OUT:    Voided (mL): 150 mL  Total OUT: 150 mL    Total NET: 175 mL    ============================ LABS =========================                        7.5    8.44  )-----------( 216      ( 07 Sep 2021 00:20 )             24.5     136  |  101  |  15  ----------------------------<  118<H>  4.0   |  27  |  0.48<L>    Ca    9.5      07 Sep 2021 00:18  Phos  2.9       Mg     1.7         TPro  7.0  /  Alb  2.9<L>  /  TBili  0.3  /  DBili  x   /  AST  82<H>  /  ALT  192<H>  /  AlkPhos  253<H>      LIVER FUNCTIONS - ( 07 Sep 2021 00:18 )  Alb: 2.9 g/dL / Pro: 7.0 g/dL / ALK PHOS: 253 U/L / ALT: 192 U/L / AST: 82 U/L / GGT: x           ABG - ( 07 Sep 2021 00:09 )  pH, Arterial: 7.40  pH, Blood: x     /  pCO2: 51    /  pO2: 160   / HCO3: 32    / Base Excess: 6.0   /  SaO2: 99.5      ======================Micro/Rad/Cardio=================  Culture: Reviewed   CXR: Reviewed  Echo: Reviewed    ======================================================  PAST MEDICAL & SURGICAL HISTORY:  CHF (congestive heart failure)    CAD (coronary artery disease)    Depression    Pleural effusion    History of 2019 novel coronavirus disease (COVID-19)  2020    Hemorrhoids    Bleeding hemorrhoids    Peripheral arterial disease    Claudication    BPH with urinary obstruction    ACC/AHA stage D systolic heart failure    Anticoagulation goal of INR 2.0 to 2.5    Falls    Clavicle fracture    CKD (chronic kidney disease), stage III    Iron deficiency anemia    H/O epistaxis    Vertigo    GI bleed    S/P TVR (tricuspid valve repair)    S/P ventricular assist device    S/P endoscopy    ====================ASSESSMENT ==============  65 YO M with a history of stage D NICM s/p HM2 on 2017 as DT (due to severe PAD) with TV ring, prior COVID-19 infection 2020, recurrent syncope post LVAD s/p ILR, and chronic abdominal pain with prior negative workup who was admitted with symptomatic anemia with Hgb 4.5 in setting of INR 8.8. Hospital course c/b cardiogenic shock, hemodynamic instability, acute hypoxemic respiratory failure s/p tracheostomy, GI bleed S/P Enteroscopy , Anemia, in setting of melena, chronic kidney disease, stress hyperglycemia, C.diff positive on , hypovolemic shock, septic shock, Enterobacter pneumonia , GB thickening/percholecystic s/p perc choley by IT , SMA stenosis    Plan:  ====================== NEUROLOGY=====================  Continue close monitoring of neuro status    Deconditioned, PT/Ambulate as tolerated  Mitazapine/ sertraline for depresion     metoclopramide Injectable 10 milliGRAM(s) IV Push every 8 hours  mirtazapine 7.5 milliGRAM(s) Oral daily  sertraline 100 milliGRAM(s) Oral daily    ==================== RESPIRATORY======================  Acute hypoxemic respiratory failure s/p #8 shiley trach on , continue TC as tolerated (TC since )  Continue close monitoring of respiratory rate and breathing pattern, following of ABG’s with A-line monitoring, continuous pulse oximetry monitoring.   Moderate secretions, continue suctioning prn, pulmonary toileting.     ====================CARDIOVASCULAR==================  Stage D Nonischemic Cardiomyopathy, Status Post HM2 on 2017; LVAD settings 9200, flow 4.8  TTE : reveals at least moderate MR, mild AI, severe global LV syst dysfxn, dec RV fxn   Continue invasive hemodynamic monitoring     propranolol 40 milliGRAM(s) Oral every 6 hours    ===================HEMATOLOGIC/ONC ===================  Acute blood loss anemia, monitor H&H/Plts    Holding coumadin and aspirin given recurrent GI bleeding. Continue to monitor LDH daily.    VTE prophylaxis, heparin   Injectable 5000 Unit(s) SubCutaneous every 8 hours    ===================== RENAL =========================  Continue to monitor I/Os, BUN/Creatinine, and urine output.   Euvolemic, Even fluid balance, currently off diuretics.    Replete lytes PRN. Keep K> 4 and Mg >2.     ==================== GASTROINTESTINAL===================  S/p cholecystostomy tube placed on  with IR   CTA A/P : Evaluation of the mesenteric vessels is limited by streak artifact from LVAD. There appears to be severe stenosis of the proximal SMA; abdominal mesenteric doppler is recommended for further evaluation. 2.  No small bowel findings to suggest acute mesenteric ischemia. 3.  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  Mesenteric duplex on 8/15 borderline stenosis of proximal SMA. ?Mesenteric angiogram with vascular this coming week.  For repeat JASMIN US to reassess gallbladder with perc sybil tube  Vital 1.5 shantelle feeds - Continue increasing tube feeds 5cc/day for goal of 65cc/hr as tolerated. Normosol 30cc/hr d/c'ed.  CT Abd/Pel on  without acute process   Reglan for gut motility  Zofran PRN nausea/vomiting     multivitamin 1 Tablet(s) Oral daily  GI prophylaxis, pantoprazole  Injectable 40 milliGRAM(s) IV Push every 12 hours  simethicone 80 milliGRAM(s) Chew every 8 hours PRN Gas  sodium chloride 0.9%. 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 Will initiate today.      - F/u thyroid studies and d/w endocrinology      - Management with hydrocortisone and methimazole     cholestyramine Powder (Sugar-Free) 4 Gram(s) Oral every 12 hours  dextrose 40% Gel 15 Gram(s) Oral once  dextrose 50% Injectable 25 Gram(s) IV Push once  dextrose 50% Injectable 12.5 Gram(s) IV Push once  glucagon  Injectable 1 milliGRAM(s) IntraMuscular once  hydrocortisone sodium succinate Injectable 50 milliGRAM(s) IV Push every 8 hours  insulin lispro (ADMELOG) corrective regimen sliding scale   SubCutaneous every 6 hours  insulin NPH human recombinant 6 Unit(s) SubCutaneous <User Schedule>  methimazole 20 milliGRAM(s) Oral <User Schedule>    ========================INFECTIOUS DISEASE================  Afebrile, WBC within normal limits   Continue trending WBC and monitoring fever curve   Culture from sybil drain on : NGTD  ID following, MD Prater, appreciate recommendations    I have spent 35 minutes of noncontinuous critical care time with this patient.    Patient requires continuous monitoring with bedside rhythm monitoring, pulse ox monitoring, and intermittent blood gas analysis. Care plan discussed with ICU care team. Patient remained critical and at risk for life threatening decompensation.     By signing my name below, I, Aparna Saleh, attest that this documentation has been prepared under the direction and in the presence of CLAUDIA Mejia   Electronically signed: Cesia Villegas, 21 @ 12:29    I, oG Sellers, personally performed the services described in this documentation. all medical record entries made by the scribe were at my direction and in my presence. I have reviewed the chart and agree that the record reflects my personal performance and is accurate and complete  Electronically signed: CLAUDIA Mejia

## 2021-09-07 NOTE — CONSULT NOTE ADULT - SUBJECTIVE AND OBJECTIVE BOX
HPI: 65M PMH ACC/AHA stage D HF due to NICM HM2 LVAD , TV annuloplasty ring 9/12/17 as destination therapy due to severe peripheral artery disease with significant stenosis  SIADH, Depression, CKD-3 with hyperkalemia, past E. coli UTIs, driveline drainage (1/7/21) and COVID-19 (back in April 2020). Patient was admitted 6/14 for GIB with elevated INR. He underwent multiple studies demonstrating old bleed in the small bowel but no source of active bleeding was identified. Hospital course has been complicated by intermittent GIB (AC has been discontinued for recurrent bleeding), aspiration with prolonged intubation s/p tracheostomy, c. diff + (s/p PO vancomycin course), serratia bacteremia and sepsis secondary to acalculous cholecystitis s/p percutaneous cholecystostomy tube placement. Patient has recovered from his sepsis and is currently doing well, melena has resolved, and he is ambulating and on trach collar. He is on continuous tube feeds and intermittently complains of abdominal pain, requiring feeds to be held on and off. General surgery was following patient. CTA done showing possible proximal SMA stenosis however evaluation of vessels limited by streak artifact.     Dr. Bran consulted for Vascular surgery second opinion.       PAST MEDICAL & SURGICAL HISTORY:  CHF (congestive heart failure)    CAD (coronary artery disease)    Depression    Pleural effusion    History of 2019 novel coronavirus disease (COVID-19)  april 2020    Hemorrhoids    Bleeding hemorrhoids    Peripheral arterial disease    Claudication    BPH with urinary obstruction    ACC/AHA stage D systolic heart failure    Anticoagulation goal of INR 2.0 to 2.5    Falls    Clavicle fracture    CKD (chronic kidney disease), stage III    Iron deficiency anemia    H/O epistaxis    Vertigo    GI bleed    S/P TVR (tricuspid valve repair)    S/P ventricular assist device    S/P endoscopy        MEDICATIONS  (STANDING):  cholestyramine Powder (Sugar-Free) 4 Gram(s) Oral every 12 hours  dextrose 40% Gel 15 Gram(s) Oral once  dextrose 50% Injectable 25 Gram(s) IV Push once  dextrose 50% Injectable 12.5 Gram(s) IV Push once  glucagon  Injectable 1 milliGRAM(s) IntraMuscular once  heparin   Injectable 5000 Unit(s) SubCutaneous every 8 hours  hydrocortisone sodium succinate Injectable 50 milliGRAM(s) IV Push every 8 hours  insulin lispro (ADMELOG) corrective regimen sliding scale   SubCutaneous every 6 hours  insulin NPH human recombinant 6 Unit(s) SubCutaneous <User Schedule>  lidocaine   4% Patch 1 Patch Transdermal daily  methimazole 20 milliGRAM(s) Oral <User Schedule>  metoclopramide Injectable 10 milliGRAM(s) IV Push every 8 hours  mirtazapine 7.5 milliGRAM(s) Oral daily  multivitamin 1 Tablet(s) Oral daily  pantoprazole  Injectable 40 milliGRAM(s) IV Push every 12 hours  propranolol 40 milliGRAM(s) Oral every 6 hours  sertraline 100 milliGRAM(s) Oral daily  sodium chloride 0.9%. 1000 milliLiter(s) (10 mL/Hr) IV Continuous <Continuous>  thiamine 100 milliGRAM(s) Oral daily    MEDICATIONS  (PRN):  ondansetron Injectable 4 milliGRAM(s) IV Push every 6 hours PRN Nausea and/or Vomiting  simethicone 80 milliGRAM(s) Chew every 8 hours PRN Gas      Allergies    Amiodarone Hydrochloride (Other (Severe))    Intolerances        SOCIAL HISTORY:    FAMILY HISTORY:          Physical Exam:  General: NAD, cachectic, resting comfortably  Neuro: A/O x 3  HEENT: NC/AT, EOMI, normal hearing, [place] feeding tube in place  Pulmonary: normal resp effort trach collar  Cardiovascular: RRR  Abdominal: soft, non-distended, tender to palpation in bilateral lower quadrants R > L  Extremities: WWP        Vital Signs Last 24 Hrs  T(C): 35.6 (07 Sep 2021 16:00), Max: 36.2 (06 Sep 2021 20:00)  T(F): 96.1 (07 Sep 2021 16:00), Max: 97.1 (06 Sep 2021 20:00)  HR: 79 (07 Sep 2021 19:00) (70 - 90)  BP: --  BP(mean): --  RR: 39 (07 Sep 2021 19:00) (14 - 43)  SpO2: 100% (07 Sep 2021 19:00) (99% - 100%)    I&O's Summary    06 Sep 2021 07:01  -  07 Sep 2021 07:00  --------------------------------------------------------  IN: 2155 mL / OUT: 1201 mL / NET: 954 mL    07 Sep 2021 07:01  -  07 Sep 2021 19:23  --------------------------------------------------------  IN: 835 mL / OUT: 480 mL / NET: 355 mL            LABS:                        7.5    8.44  )-----------( 216      ( 07 Sep 2021 00:20 )             24.5     09-07    136  |  101  |  15  ----------------------------<  118<H>  4.0   |  27  |  0.48<L>    Ca    9.5      07 Sep 2021 00:18  Phos  2.9     09-07  Mg     1.7     09-07    TPro  7.0  /  Alb  2.9<L>  /  TBili  0.3  /  DBili  x   /  AST  82<H>  /  ALT  192<H>  /  AlkPhos  253<H>  09-07        CAPILLARY BLOOD GLUCOSE      POCT Blood Glucose.: 112 mg/dL (07 Sep 2021 17:29)  POCT Blood Glucose.: 109 mg/dL (07 Sep 2021 12:59)  POCT Blood Glucose.: 113 mg/dL (07 Sep 2021 06:16)  POCT Blood Glucose.: 123 mg/dL (07 Sep 2021 00:04)    LIVER FUNCTIONS - ( 07 Sep 2021 00:18 )  Alb: 2.9 g/dL / Pro: 7.0 g/dL / ALK PHOS: 253 U/L / ALT: 192 U/L / AST: 82 U/L / GGT: x             Cultures:      RADIOLOGY & ADDITIONAL STUDIES:  < from: US Doppler Mesenteric (08.15.21 @ 13:17) >    FINDINGS:    Liver: Within normal limits.  Bile ducts: Normal caliber. Common bile duct measures 1.7 mm.  Gallbladder: Status post percutaneous cholecystostomy. Nocollection seen.  Pancreas: Limited visualization.  Right kidney: 10.5 cm. No hydronephrosis.  Ascites: None.  IVC: Visualized portions are within normal limits.    The abdominal aorta is normal in size without evidence of significant plaque or aneurysm. The peak systolic velocity in the abdominal aorta is 76 cm/s.    The celiac and superior mesenteric arteries are well visualized.  The peak systolic velocities in the celiac artery are as follows: Proximal 134, mid 134 and distal 140 cm/s.  The peak systolic velocities in the superior mesenteric artery are as follows: Proximal 266, mid 144 and distal 93 cm/s.    The inferior mesenteric artery was not visualized.    IMPRESSION:    Status post percutaneous cholecystostomy. No evidence of bile duct dilatation, collection or liver mass.    Doppler evaluation of the mesenteric arteries demonstrates borderline stenosis of the proximal superior mesenteric artery. No significant stenosis is seen in the celiac artery. The inferior mesenteric artery was not visualized.          < end of copied text >  < from: US Abdomen Upper Quadrant Right (08.15.21 @ 13:17) >  FINDINGS:    Liver: Within normal limits.  Bile ducts: Normal caliber. Common bile duct measures 1.7 mm.  Gallbladder: Status post percutaneous cholecystostomy. Nocollection seen.  Pancreas: Limited visualization.  Right kidney: 10.5 cm. No hydronephrosis.  Ascites: None.  IVC: Visualized portions are within normal limits.    The abdominal aorta is normal in size without evidence of significant plaque or aneurysm. The peak systolic velocity in the abdominal aorta is 76 cm/s.    The celiac and superior mesenteric arteries are well visualized.  The peak systolic velocities in the celiac artery are as follows: Proximal 134, mid 134 and distal 140 cm/s.  The peak systolic velocities in the superior mesenteric artery are as follows: Proximal 266, mid 144 and distal 93 cm/s.    The inferior mesenteric artery was not visualized.    IMPRESSION:    Status post percutaneous cholecystostomy. No evidence of bile duct dilatation, collection or liver mass.    Doppler evaluation of the mesenteric arteries demonstrates borderline stenosis of the proximal superior mesenteric artery. No significant stenosis is seen in the celiac artery. The inferior mesenteric artery was not visualized.        --- End of Report ---    < end of copied text >       HPI: 65M PMH ACC/AHA stage D HF due to NICM HM2 LVAD , TV annuloplasty ring 9/12/17 as destination therapy due to severe peripheral artery disease with significant stenosis  SIADH, Depression, CKD-3 with hyperkalemia, past E. coli UTIs, driveline drainage (1/7/21) and COVID-19 (back in April 2020). Patient was admitted 6/14 for GIB with elevated INR. He underwent multiple studies demonstrating old bleed in the small bowel but no source of active bleeding was identified. Hospital course has been complicated by intermittent GIB (AC has been discontinued for recurrent bleeding), aspiration with prolonged intubation s/p tracheostomy, c. diff + (s/p PO vancomycin course), serratia bacteremia and sepsis secondary to acalculous cholecystitis s/p percutaneous cholecystostomy tube placement. Patient has recovered from his sepsis and is currently doing well, melena has resolved, and he is ambulating and on trach collar. He is on continuous tube feeds and intermittently complains of abdominal pain, requiring feeds to be held on and off with occasional episodes of emesis. General surgery was following patient. CTA done showing possible proximal SMA stenosis, however evaluation of vessels limited by streak artifact.     Dr. Bran consulted for Vascular surgery second opinion.       PAST MEDICAL & SURGICAL HISTORY:  CHF (congestive heart failure)    CAD (coronary artery disease)    Depression    Pleural effusion    History of 2019 novel coronavirus disease (COVID-19)  april 2020    Hemorrhoids    Bleeding hemorrhoids    Peripheral arterial disease    Claudication    BPH with urinary obstruction    ACC/AHA stage D systolic heart failure    Anticoagulation goal of INR 2.0 to 2.5    Falls    Clavicle fracture    CKD (chronic kidney disease), stage III    Iron deficiency anemia    H/O epistaxis    Vertigo    GI bleed    S/P TVR (tricuspid valve repair)    S/P ventricular assist device    S/P endoscopy        MEDICATIONS  (STANDING):  cholestyramine Powder (Sugar-Free) 4 Gram(s) Oral every 12 hours  dextrose 40% Gel 15 Gram(s) Oral once  dextrose 50% Injectable 25 Gram(s) IV Push once  dextrose 50% Injectable 12.5 Gram(s) IV Push once  glucagon  Injectable 1 milliGRAM(s) IntraMuscular once  heparin   Injectable 5000 Unit(s) SubCutaneous every 8 hours  hydrocortisone sodium succinate Injectable 50 milliGRAM(s) IV Push every 8 hours  insulin lispro (ADMELOG) corrective regimen sliding scale   SubCutaneous every 6 hours  insulin NPH human recombinant 6 Unit(s) SubCutaneous <User Schedule>  lidocaine   4% Patch 1 Patch Transdermal daily  methimazole 20 milliGRAM(s) Oral <User Schedule>  metoclopramide Injectable 10 milliGRAM(s) IV Push every 8 hours  mirtazapine 7.5 milliGRAM(s) Oral daily  multivitamin 1 Tablet(s) Oral daily  pantoprazole  Injectable 40 milliGRAM(s) IV Push every 12 hours  propranolol 40 milliGRAM(s) Oral every 6 hours  sertraline 100 milliGRAM(s) Oral daily  sodium chloride 0.9%. 1000 milliLiter(s) (10 mL/Hr) IV Continuous <Continuous>  thiamine 100 milliGRAM(s) Oral daily    MEDICATIONS  (PRN):  ondansetron Injectable 4 milliGRAM(s) IV Push every 6 hours PRN Nausea and/or Vomiting  simethicone 80 milliGRAM(s) Chew every 8 hours PRN Gas      Allergies    Amiodarone Hydrochloride (Other (Severe))    Intolerances        SOCIAL HISTORY:    FAMILY HISTORY:          Physical Exam:  General: NAD, cachectic, resting comfortably  Neuro: A/O x 3  HEENT: NC/AT, EOMI, normal hearing, [place] feeding tube in place  Pulmonary: normal resp effort trach collar  Cardiovascular: RRR  Abdominal: soft, non-distended, tender to palpation in bilateral lower quadrants R > L  Extremities: WWP        Vital Signs Last 24 Hrs  T(C): 35.6 (07 Sep 2021 16:00), Max: 36.2 (06 Sep 2021 20:00)  T(F): 96.1 (07 Sep 2021 16:00), Max: 97.1 (06 Sep 2021 20:00)  HR: 79 (07 Sep 2021 19:00) (70 - 90)  BP: --  BP(mean): --  RR: 39 (07 Sep 2021 19:00) (14 - 43)  SpO2: 100% (07 Sep 2021 19:00) (99% - 100%)    I&O's Summary    06 Sep 2021 07:01  -  07 Sep 2021 07:00  --------------------------------------------------------  IN: 2155 mL / OUT: 1201 mL / NET: 954 mL    07 Sep 2021 07:01  -  07 Sep 2021 19:23  --------------------------------------------------------  IN: 835 mL / OUT: 480 mL / NET: 355 mL            LABS:                        7.5    8.44  )-----------( 216      ( 07 Sep 2021 00:20 )             24.5     09-07    136  |  101  |  15  ----------------------------<  118<H>  4.0   |  27  |  0.48<L>    Ca    9.5      07 Sep 2021 00:18  Phos  2.9     09-07  Mg     1.7     09-07    TPro  7.0  /  Alb  2.9<L>  /  TBili  0.3  /  DBili  x   /  AST  82<H>  /  ALT  192<H>  /  AlkPhos  253<H>  09-07        CAPILLARY BLOOD GLUCOSE      POCT Blood Glucose.: 112 mg/dL (07 Sep 2021 17:29)  POCT Blood Glucose.: 109 mg/dL (07 Sep 2021 12:59)  POCT Blood Glucose.: 113 mg/dL (07 Sep 2021 06:16)  POCT Blood Glucose.: 123 mg/dL (07 Sep 2021 00:04)    LIVER FUNCTIONS - ( 07 Sep 2021 00:18 )  Alb: 2.9 g/dL / Pro: 7.0 g/dL / ALK PHOS: 253 U/L / ALT: 192 U/L / AST: 82 U/L / GGT: x             Cultures:      RADIOLOGY & ADDITIONAL STUDIES:  < from: US Doppler Mesenteric (08.15.21 @ 13:17) >    FINDINGS:    Liver: Within normal limits.  Bile ducts: Normal caliber. Common bile duct measures 1.7 mm.  Gallbladder: Status post percutaneous cholecystostomy. Nocollection seen.  Pancreas: Limited visualization.  Right kidney: 10.5 cm. No hydronephrosis.  Ascites: None.  IVC: Visualized portions are within normal limits.    The abdominal aorta is normal in size without evidence of significant plaque or aneurysm. The peak systolic velocity in the abdominal aorta is 76 cm/s.    The celiac and superior mesenteric arteries are well visualized.  The peak systolic velocities in the celiac artery are as follows: Proximal 134, mid 134 and distal 140 cm/s.  The peak systolic velocities in the superior mesenteric artery are as follows: Proximal 266, mid 144 and distal 93 cm/s.    The inferior mesenteric artery was not visualized.    IMPRESSION:    Status post percutaneous cholecystostomy. No evidence of bile duct dilatation, collection or liver mass.    Doppler evaluation of the mesenteric arteries demonstrates borderline stenosis of the proximal superior mesenteric artery. No significant stenosis is seen in the celiac artery. The inferior mesenteric artery was not visualized.          < end of copied text >  < from: US Abdomen Upper Quadrant Right (08.15.21 @ 13:17) >  FINDINGS:    Liver: Within normal limits.  Bile ducts: Normal caliber. Common bile duct measures 1.7 mm.  Gallbladder: Status post percutaneous cholecystostomy. Nocollection seen.  Pancreas: Limited visualization.  Right kidney: 10.5 cm. No hydronephrosis.  Ascites: None.  IVC: Visualized portions are within normal limits.    The abdominal aorta is normal in size without evidence of significant plaque or aneurysm. The peak systolic velocity in the abdominal aorta is 76 cm/s.    The celiac and superior mesenteric arteries are well visualized.  The peak systolic velocities in the celiac artery are as follows: Proximal 134, mid 134 and distal 140 cm/s.  The peak systolic velocities in the superior mesenteric artery are as follows: Proximal 266, mid 144 and distal 93 cm/s.    The inferior mesenteric artery was not visualized.    IMPRESSION:    Status post percutaneous cholecystostomy. No evidence of bile duct dilatation, collection or liver mass.    Doppler evaluation of the mesenteric arteries demonstrates borderline stenosis of the proximal superior mesenteric artery. No significant stenosis is seen in the celiac artery. The inferior mesenteric artery was not visualized.        --- End of Report ---    < end of copied text >

## 2021-09-08 NOTE — PROGRESS NOTE ADULT - SUBJECTIVE AND OBJECTIVE BOX
Subjective: Patient seen and examined resting in bed. ON no issues    Medications:  cholestyramine Powder (Sugar-Free) 4 Gram(s) Oral every 12 hours  dextrose 40% Gel 15 Gram(s) Oral once  dextrose 50% Injectable 25 Gram(s) IV Push once  dextrose 50% Injectable 12.5 Gram(s) IV Push once  glucagon  Injectable 1 milliGRAM(s) IntraMuscular once  heparin   Injectable 5000 Unit(s) SubCutaneous every 8 hours  hydrocortisone sodium succinate Injectable 50 milliGRAM(s) IV Push two times a day  insulin lispro (ADMELOG) corrective regimen sliding scale   SubCutaneous every 6 hours  insulin NPH human recombinant 6 Unit(s) SubCutaneous <User Schedule>  lidocaine   4% Patch 1 Patch Transdermal daily  methimazole 20 milliGRAM(s) Oral <User Schedule>  metoclopramide Injectable 10 milliGRAM(s) IV Push every 8 hours  mirtazapine 7.5 milliGRAM(s) Oral daily  multivitamin 1 Tablet(s) Oral daily  ondansetron Injectable 4 milliGRAM(s) IV Push every 6 hours PRN  pantoprazole  Injectable 40 milliGRAM(s) IV Push every 12 hours  propranolol 40 milliGRAM(s) Oral every 8 hours  sertraline 100 milliGRAM(s) Oral daily  simethicone 80 milliGRAM(s) Chew every 8 hours PRN  sodium chloride 0.9%. 1000 milliLiter(s) IV Continuous <Continuous>  thiamine 100 milliGRAM(s) Oral daily      ICU Vital Signs Last 24 Hrs  T(C): 35.9 (08 Sep 2021 12:00), Max: 37.1 (07 Sep 2021 20:00)  T(F): 96.7 (08 Sep 2021 12:00), Max: 98.8 (07 Sep 2021 20:00)  HR: 90 (08 Sep 2021 12:00) (72 - 92)  BP: 89/63 (08 Sep 2021 11:00) (81/60 - 94/60)  BP(mean): 71 (08 Sep 2021 11:00) (64 - 71)  ABP: 92/65 (08 Sep 2021 12:00) (67/55 - 210/210)  ABP(mean): 76 (08 Sep 2021 12:00) (57 - 210)  RR: 22 (08 Sep 2021 12:00) (18 - 39)  SpO2: 99% (08 Sep 2021 12:00) (93% - 100%)      Weight in k.4 ( @ 00:00)    I&O's Summary    07 Sep 2021 07:01  -  08 Sep 2021 07:00  --------------------------------------------------------  IN: 1645 mL / OUT: 955 mL / NET: 690 mL    08 Sep 2021 07:01  -  08 Sep 2021 13:04  --------------------------------------------------------  IN: 345 mL / OUT: 125 mL / NET: 220 mL        Physical Exam  General: No distress. Comfortable.  HEENT: EOM intact.  Neck: JVP not elevated  Chest: +trach   CV: +VAD hum  Abdomen: Soft, RUQ tenderness by perc dawn site. Dawn drain with bilious fluid. Positive BS throughout.  Neurology: Alert and oriented times three.     LVAD Interrogation  VAD TYPE HM 2  Speed 9200  Flow 6  Power 6  PI 4.8  Assessment of driveline exit site: driveline dressing c/d/i  Programming changes: no changes made    Labs:                        7.9    12.93 )-----------( 238      ( 08 Sep 2021 00:32 )             25.1         133<L>  |  99  |  15  ----------------------------<  142<H>  4.1   |  25  |  0.47<L>    Ca    9.6      08 Sep 2021 00:32  Phos  2.9       Mg     1.7         TPro  7.0  /  Alb  3.0<L>  /  TBili  0.4  /  DBili  x   /  AST  97<H>  /  ALT  230<H>  /  AlkPhos  260<H>                Lactate Dehydrogenase, Serum: 197 U/L ( @ 00:32)  Lactate Dehydrogenase, Serum: 189 U/L ( @ 00:18)  Lactate Dehydrogenase, Serum: 205 U/L ( @ 00:25)

## 2021-09-08 NOTE — PROGRESS NOTE ADULT - ASSESSMENT
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 coli UTIs, driveline drainage (1/7/21) and COVID-19, here initially for GIB prolonged hospital course, s/p tracheostomy, acalculous cholecystitis s/p perc dawn. Patient has persistent intermittent abdominal pain, CTA showing possible proximal SMA stenosis. CTA poor quality limited by significant streak artifact. Mesenteric duplex velocities without evidence of significant stenosis, however study unreliable in the setting of patient's augmented systolic function given LVAD. Patient continues to report abdominal pain.    - Will plan for mesenteric angiogram some time this admission pending attending availability  - Please document clearance for OR for mesenteric angiogram  - Continue excellent care per [facility]      Vascular Surgery  [id number]

## 2021-09-08 NOTE — PROGRESS NOTE ADULT - SUBJECTIVE AND OBJECTIVE BOX
SURGERY PROGRESS NOTE    SUBJECTIVE / 24H EVENTS:  Patient seen and examined on morning rounds. No acute events overnight.      OBJECTIVE:  VITAL SIGNS:  T(C): 36.4 (21 @ 09:00), Max: 37.1 (21 @ 20:00)  HR: 80 (21 @ 11:00) (72 - 92)  BP: 89/63 (21 @ 11:00) (81/60 - 94/60)  RR: 27 (21 @ 11:00) (18 - 39)  SpO2: 99% (21 @ 11:00) (93% - 100%)  Daily     Daily Weight in k.4 (08 Sep 2021 00:00)  POCT Blood Glucose.: 108 mg/dL (21 @ 11:12)  POCT Blood Glucose.: 119 mg/dL (21 @ 05:28)  POCT Blood Glucose.: 150 mg/dL (21 @ 00:02)      PHYSICAL EXAM:  Gen: NAD  LS: Respirations unlabored on RA  GI: Soft. Nondistended. Tender to palpation in bilateral lower quadrants > upper quadrants. No rebound tenderness or guarding  Ext: Warm, well perfused      21 @ 07:01  -  21 @ 07:00  --------------------------------------------------------  IN:    Enteral Tube Flush: 240 mL    Miscellaneous Tube Feedin mL    multiple electrolytes Injection Type 1.: 60 mL    sodium chloride 0.9%: 210 mL  Total IN: 1645 mL    OUT:    Drain (mL): 305 mL    Voided (mL): 650 mL  Total OUT: 955 mL    Total NET: 690 mL      21 @ 07:01  -  21 @ 12:03  --------------------------------------------------------  IN:    Miscellaneous Tube Feedin mL    sodium chloride 0.9%: 20 mL  Total IN: 170 mL    OUT:  Total OUT: 0 mL    Total NET: 170 mL          LAB VALUES:      133<L>  |  99  |  15  ----------------------------<  142<H>  4.1   |  25  |  0.47<L>    Ca    9.6      08 Sep 2021 00:32  Phos  2.9       Mg     1.7         TPro  7.0  /  Alb  3.0<L>  /  TBili  0.4  /  DBili  x   /  AST  97<H>  /  ALT  230<H>  /  AlkPhos  260<H>                                 7.9    12.93 )-----------( 238      ( 08 Sep 2021 00:32 )             25.1     LIVER FUNCTIONS - ( 08 Sep 2021 00:32 )  Alb: 3.0 g/dL / Pro: 7.0 g/dL / ALK PHOS: 260 U/L / ALT: 230 U/L / AST: 97 U/L / GGT: x             ABG - ( 08 Sep 2021 00:14 )  pH, Arterial: 7.40  pH, Blood: x     /  pCO2: 52    /  pO2: 139   / HCO3: 32    / Base Excess: 6.6   /  SaO2: 99.9                      MICROBIOLOGY:    Culture - Sputum (collected 07 Sep 2021 18:32)  Source: .Sputum Sputum  Gram Stain (07 Sep 2021 21:06):    Numerous polymorphonuclear leukocytes per low power field    Rare Squamous epithelial cells per low power field    Rare Gram positive cocci in pairs per oil power field    Rare Gram Variable Rods per oil power field        RADIOLOGY:        MEDICATIONS  (STANDING):  cholestyramine Powder (Sugar-Free) 4 Gram(s) Oral every 12 hours  dextrose 40% Gel 15 Gram(s) Oral once  dextrose 50% Injectable 25 Gram(s) IV Push once  dextrose 50% Injectable 12.5 Gram(s) IV Push once  glucagon  Injectable 1 milliGRAM(s) IntraMuscular once  heparin   Injectable 5000 Unit(s) SubCutaneous every 8 hours  hydrocortisone sodium succinate Injectable 50 milliGRAM(s) IV Push two times a day  insulin lispro (ADMELOG) corrective regimen sliding scale   SubCutaneous every 6 hours  insulin NPH human recombinant 6 Unit(s) SubCutaneous <User Schedule>  lidocaine   4% Patch 1 Patch Transdermal daily  methimazole 20 milliGRAM(s) Oral <User Schedule>  metoclopramide Injectable 10 milliGRAM(s) IV Push every 8 hours  mirtazapine 7.5 milliGRAM(s) Oral daily  multivitamin 1 Tablet(s) Oral daily  pantoprazole  Injectable 40 milliGRAM(s) IV Push every 12 hours  propranolol 40 milliGRAM(s) Oral every 8 hours  sertraline 100 milliGRAM(s) Oral daily  sodium chloride 0.9%. 1000 milliLiter(s) (10 mL/Hr) IV Continuous <Continuous>  thiamine 100 milliGRAM(s) Oral daily    MEDICATIONS  (PRN):  ondansetron Injectable 4 milliGRAM(s) IV Push every 6 hours PRN Nausea and/or Vomiting  simethicone 80 milliGRAM(s) Chew every 8 hours PRN Gas

## 2021-09-08 NOTE — PROGRESS NOTE ADULT - SUBJECTIVE AND OBJECTIVE BOX
RICKY JOINT  MRN#: [id number]  Subjective:  Pulmonary progress  : recurrent Acute hypoxic respiratory Failure ,aspiration pneumonia, NICM  , chart reviewed and H/O obtained radiological and Laboratory study reviewed patient Examined     65M PMH ACC/AHA stage D HF due to NICM HM2 LVAD , TV annuloplasty ring 17 as destination therapy due to severe peripheral artery disease with significant stenosis  SIADH, Depression, CKD-3 with hyperkalemia, past E. coli UTIs, driveline drainage (21) and COVID-19 (back in 2020)  He was recently seen in clinic where he complained of abdominal pain and dark stools w constipation back in May. He presents to [facility] ER today weakness and fatigue, moderate and + Black stools for three days, on coumadin secondary to warfarin use in the setting of an LVAD. Patient has required transfusions for GIB in the past. Mostly recently back in 2021 pt had anemia with dark stools. No interventions was done at that time. However Last Endoscopy was done in 2020 (negative). Today labs show patient is anemic with H/H of 4.5/16.3,. INR is 8.84 MAP in the 90s, Temp 35.1. He denies any chest pain, shortness of breath, dizziness, abd pain, nausea or vomiting. found to have  rectal bleeding underwent endoscopy ,old blood in the proximal ileum ,  develop sepsis with LL opacity given Antibiotics , Extubated , reintubated , Bronchoscopy on Zosyn for LL pneumonia  and Amiodarone S/P TV Annuloplasty , patient remain intubated on full ventilatory support .S/P multiple units of blood transfusion , remain on full ventilatory support on Precedex and propofol , new central IJ line , diarrhea C diff. +ve on po vancomycin and IV Flagyl,  mildly distended belly , fever start on cefepime 2gm q 8 hrs S/P tracheostomy .  new RT Subclavian central line continue on contact  isolation ,still diarrhea on C-diff antibiotics ENT follow up appreciated , trial of C-PAP as tolerated , , copious secretion from trach. site chest x ray left lower lobe pneumonia , tolerating trch. collar 50% FI02 still excessive secretion need pulmonary toilet , off Ancef antibiotic , no more diarrhea back on full support mechanical ventilator , chest x ray show improvement in LLL air space disease, more awake and responsive on tube feeding no more diarrhea , S/P  Ancef for bacteremia no fever ,ID follow up noted ,  no nausea or vomiting or diarrhea still very weak and tired , event note pulled NG tube now replaced , back on tube feeding ,still on po vancomycin , getting PT and OT at bed side , no plan for decannulation for now. , no more diarrhea receiving PT and OPT at bed side , minimal secretion from tracheostomy site , no SOB getting stronger , improve muscle tone patient transfer to monitor bed still on contact isolation for C-Difficel colitis on 50% FI02, NG tube clogged , and change to resume tube feeding still loose stool . H/H drop significantly require blood transfusion , most likely GI bleeding , IV heparin D/C , vomiting 200 cc of creamy color tube feeding on hold no sob, still melena monitor in the CTU possible capsule endoscopy , H/H is stable ., patient develop TR sided  pneumothorax require chest tube placement , RT IJ central line  placed , develop fever shaking chills , blood culture positive for serratia marcescens , start on cefepime .the patient  become hypoxic and hypotensive placed on full ventilatory support and Vasopressin , levophed and Dobutamine ,S/P blood transfusion on meropenem and vancomycin ,   , on and  off pressors , occasional agitation on Precedex .S/P IR cholecystostomy tube drainage placement in the RT upper Quadrant , resume anticoagulation chest x ray noted C-PAP trail lasted only for 2 hrs , new RT SC line and D/C RT IJ line , RT pig tail cathter has been removed , tolerating C-PAP trial placed on trach. collar 50% FI02 GI consultant noted on NG tube feeding as tolerated , develop AF RVR S/P  bolus Amiodarone  back to regular sinus Rhythm , Flat Affect depressed , back on tube feeding Vital AF at 60 cc/ hr .still intermittent abdominal pain , no fever saturation is accepted  back on full ventilatory support , tired and sleepy on round  .start  back on  tube feeding . tolerating it very well , no nausea or vomiting , good 02 saturation on trac-collar on methimazole for hyperthyroidism , no new C/O no plan for decannulation yet , electrolyte blood test is still pending .on respiratory isolation sputum culture is negative , increase WBC  improved on cefepime , sputum positive for enterococcus , condition is the same ,still C/O Abdominal pain , white count is improving , no chest pain or SOB ,  .placed on Reglan 10 mg TID for gastric motility , depressed , withdrawn. on full NG tube diet with Vital , secretion are much improved        (2021 16:57)    PAST MEDICAL & SURGICAL HISTORY:  CHF (congestive heart failure)    CAD (coronary artery disease)  Depression    Pleural effusion    History of 2019 novel coronavirus disease (COVID-19)  2020    Hemorrhoids    Bleeding hemorrhoids    Peripheral arterial disease    Claudication    BPH with urinary obstruction    ACC/AHA stage D systolic heart failure  Anticoagulation goal of INR 2.0 to 2.5    Falls    Clavicle fracture    CKD (chronic kidney disease), stage III    Iron deficiency anemia    H/O epistaxis    Vertigo    GI bleed    S/P TVR (tricuspid valve repair)    S/P ventricular assist device    S/P endoscopy    OBJECTIVE:  ICU Vital Signs Last 24 Hrs  T(C): 38.2 (2021 10:00), Max: 38.5 (2021 12:00)  T(F): 100.8 (2021 10:00), Max: 101.3 (2021 12:00)  HR: 65 (2021 10:00) (61 - 69)  BP: --  BP(mean): --  ABP: 105/67 (2021 10:00) (90/54 - 113/64)  ABP(mean): 77 (2021 10:00) (63 - 77)  RR: 20 (2021 10:00) (19 - 35)  SpO2: 99% (2021 10:00) (96% - 100%)       @ :  -   @ 07:00  --------------------------------------------------------  IN: 2693.9 mL / OUT: 1415 mL / NET: 1278.9 mL     @ 07:  -   @ 10:49  --------------------------------------------------------  IN: 420.8 mL / OUT: 115 mL / NET: 305.8 mL  PHYSICAL EXAM:Daily   Elderly male S/P tracheostomy on trach. collar 50% FI02 ,  Daily Weight in k.4 (2021 00:00)  HEENT:     + NCAT  + EOMI  - Conjuctival edema   - Icterus   - Thrush   - ETT  + NGT/OGT  Neck:         + FROM  RT IJ  line JVD  - Nodes - Masses + Mid-line trachea + Tracheostomy  Chest:            normal A-P diameter    Lungs:          + CTA   + Rhonchi    - Rales    - Wheezing + Decreased  LT BS   - Dullness R L  Cardiac:       + S1 + S2    + RRR   - Irregular   - S3  - S4    - Murmurs   - Rub   - Hamman’s sign   Abdomen:    + BS  + Soft + Non-tender - Distended - Organomegaly - PEG .cholecystostomy tube in place  Extremities:   - Cyanosis U/L   - Clubbing  U/L  + LE/UE Edema   + Capillary refill    + Pulses   Neuro:        - Awake   -  Alert   - Confused   - Lethargic   + Sedated  + Generalized Weakness  Skin:        - Rashes    - Erythema   + Normal incisions   + IV sites intact          HOSPITAL MEDICATIONS: All medications reviewed and analyzed  MEDICATIONS  (STANDING):  amiodarone    Tablet 200 milliGRAM(s) Oral daily  chlorhexidine 0.12% Liquid 15 milliLiter(s) Oral Mucosa every 12 hours  chlorhexidine 2% Cloths 1 Application(s) Topical <User Schedule>  dexmedetomidine Infusion 0.5 MICROgram(s)/kG/Hr (9.81 mL/Hr) IV Continuous <Continuous>  dextrose 50% Injectable 50 milliLiter(s) IV Push every 15 minutes  heparin  Infusion 400 Unit(s)/Hr (12.5 mL/Hr) IV Continuous <Continuous>  Hydromorphone  Injectable 0.5 milliGRAM(s) IV Push once  insulin lispro (ADMELOG) corrective regimen sliding scale   SubCutaneous every 6 hours  pantoprazole  Injectable 40 milliGRAM(s) IV Push every 12 hours  piperacillin/tazobactam IVPB.. 3.375 Gram(s) IV Intermittent every 8 hours  propofol Infusion 20 MICROgram(s)/kG/Min (9.42 mL/Hr) IV Continuous <Continuous>  sodium chloride 0.9% lock flush 3 milliLiter(s) IV Push every 8 hours  sodium chloride 0.9%. 1000 milliLiter(s) (10 mL/Hr) IV Continuous <Continuous>    MEDICATIONS  (PRN):  acetaminophen    Suspension .. 650 milliGRAM(s) Oral every 6 hours PRN Temp greater or equal to 38C (100.4F)    LABS: All Lab data reviewed and analyzed                         7. )-----------( 238      ( 08 Sep 2021 00:32 )             25.1    09-08    133<L>  |  99  |  15  ----------------------------<  142<H>  4.1   |  25  |  0.47<L>    Ca    9.6      08 Sep 2021 00:32  Phos  2.9     09-08  Mg     1.7     -    TPro  7.0  /  Alb  3.0<L>  /  TBili  0.4  /  DBili  x   /  AST  97<H>  /  ALT  230<H>  /  AlkPhos  260<H>      Ca    9.5      07 Sep 2021 00:18  Phos  2.9     09-07  Mg     1.7         TPro  7.0  /  Alb  2.9<L>  /  TBili  0.3  /  DBili  x   /  AST  82<H>  /  ALT  192<H>  /  AlkPhos  253<H>                 24.5   Ca    9.7      06 Sep 2021 00:25  Phos  2.9     09-06  Mg     1.8     -    TPro  7.4  /  Alb  3.0<L>  /  TBili  0.4  /  DBili  x   /  AST  86<H>  /  ALT  187<H>  /  AlkPhos  282<H>      Ca    10.1      05 Sep 2021 01:48  Phos  3.5     09-05  Mg     1.9     -    TPro  7.8  /  Alb  3.1<L>  /  TBili  0.4  /  DBili  x   /  AST  75<H>  /  ALT  179<H>  /  AlkPhos  289<H>      Ca    10.1      04 Sep 2021 00:40  Phos  3.0     09-04  Mg     1.8         TPro  8.0  /  Alb  3.1<L>  /  TBili  0.5  /  DBili  x   /  AST  79<H>  /  ALT  178<H>  /  AlkPhos  318<H>                                                                                                                        PTT - ( 2021 04:52 )  PTT:45.2 sec LIVER FUNCTIONS - ( 2021 00:42 )  Alb: 3.4 g/dL / Pro: 6.7 g/dL / ALK PHOS: 213 U/L / ALT: 15 U/L / AST: 24 U/L / GGT: x           RADIOLOGY: - Reviewed and analyzed RT Pig tail cathter  , LVAD HM2, CT scan of abdomen reviewed result noted

## 2021-09-08 NOTE — PROGRESS NOTE ADULT - ATTENDING COMMENTS
No acute events overnight.  Reports abdominal discomfort but he is tolerating feedings, today at 50 ml/h.  H/H is table and no further evidence of acute bleeding.   Labs remarkable for slight leukocytosis no clear source of infection (?steroid induced). HE also has elevated LFTs. No clear evidence of fluid overload. Will monitor.   He is hemodynamically stable and has no cardiac contraindication for angiogram. However, his recently diagnosed thyroiditis is thought to be due to amiodarone + contrast exposure. Recommend discussion with endocrine regarding risks/benefits as his TSH remains suppressed.

## 2021-09-08 NOTE — PROGRESS NOTE ADULT - ASSESSMENT
67 YO M with a history of stage D NICM s/p HM2 on 9/2017 as DT (due to severe PAD) with TV ring, prior COVID-19 infection 4/2020, recurrent syncope post LVAD s/p ILR, and chronic abdominal pain with prior negative workup who was admitted 6/14/21 with symptomatic anemia with Hgb 4.5 in setting of INR 8.8 without hemodynamic instability and has since had a protracted hospitalization. He was transfused and underwent VCE which showed active bleeding in the mid small bowel but subsequent enteroscopy 6/15 did not reveal any active bleeding. He acutely decompensated after procedure with fever/hypertension, low flow alarms, and pulmonary infiltrate with hypoxia requiring intubation from probable aspiration PNA. He was unable to extubated and has since undergone tracheostomy but tolerating persistent trach collar and nearing ability for decannulation. 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 Short term goal is maintenance of adequate nutrition and eventual trach decannulation.     Will continue to advance tube feeds as tolerated and work aggressively with PT. Will need to discuss with team about risk vs benefits of proceeding with angiogram given recent diagnosis of thyrotoxicosis on this admission.

## 2021-09-08 NOTE — PROGRESS NOTE ADULT - SUBJECTIVE AND OBJECTIVE BOX
[facility] NUTRITION SUPPORT-- FOLLOW UP NOTE  --------------------------------------------------------------------------------    24 hour events/subjective: pt seen and examined. resting in bed. c/o abd pain and nausea. tolerating tf at 50cc/hr, no vomiting overnight per nursing. +bm yesterday. now on steroid taper, med adjustments as per endo noted. seen by vascular, planning for mesenteric angiogram. US pending    Diet:  Diet, NPO with Tube Feed:   Tube Feeding Modality: Nasogastric  Vital 1.5 Tank (VITAL1.5RTH)  Total Volume for 24 Hours (mL): 1560  Continuous  Starting Tube Feed Rate mL per Hour: 10  Increase Tube Feed Rate by (mL): 5     Every 24 hours  Until Goal Tube Feed Rate (mL per Hour): 65  Tube Feed Duration (in Hours): 24  Tube Feed Start Time: 11:45  Supplement Feeding Modality:  Nasogastric  Probiotic Yogurt/Smoothie Cans or Servings Per Day:  2       Frequency:  Daily (21 @ 00:07)      PAST HISTORY  --------------------------------------------------------------------------------  No significant changes to PMH, PSH, FHx, SHx, unless otherwise noted    ALLERGIES & MEDICATIONS  --------------------------------------------------------------------------------  Allergies    Amiodarone Hydrochloride (Other (Severe))    Intolerances      Standing Inpatient Medications  cholestyramine Powder (Sugar-Free) 4 Gram(s) Oral every 12 hours  dextrose 40% Gel 15 Gram(s) Oral once  dextrose 50% Injectable 25 Gram(s) IV Push once  dextrose 50% Injectable 12.5 Gram(s) IV Push once  glucagon  Injectable 1 milliGRAM(s) IntraMuscular once  heparin   Injectable 5000 Unit(s) SubCutaneous every 8 hours  hydrocortisone sodium succinate Injectable 50 milliGRAM(s) IV Push two times a day  insulin lispro (ADMELOG) corrective regimen sliding scale   SubCutaneous every 6 hours  insulin NPH human recombinant 6 Unit(s) SubCutaneous <User Schedule>  lidocaine   4% Patch 1 Patch Transdermal daily  methimazole 20 milliGRAM(s) Oral <User Schedule>  metoclopramide Injectable 10 milliGRAM(s) IV Push every 8 hours  mirtazapine 7.5 milliGRAM(s) Oral daily  multivitamin 1 Tablet(s) Oral daily  pantoprazole  Injectable 40 milliGRAM(s) IV Push every 12 hours  propranolol 40 milliGRAM(s) Oral every 8 hours  sertraline 100 milliGRAM(s) Oral daily  sodium chloride 0.9%. 1000 milliLiter(s) IV Continuous <Continuous>  thiamine 100 milliGRAM(s) Oral daily    PRN Inpatient Medications  ondansetron Injectable 4 milliGRAM(s) IV Push every 6 hours PRN  simethicone 80 milliGRAM(s) Chew every 8 hours PRN        REVIEW OF SYSTEMS  --------------------------------------------------------------------------------  General: see hpi  HEENT:  no headaches, no vision changes, no ear pain, no epistaxis   CV:  no chest pain, no palpitations  Resp:  no dyspnea  GI:  see hpi  :  no pain, no bleeding, no dysuria  Muscle:  no pain, no weakness  Neuro:  no numbness, no tingling, no memory problems  Psych:  no insomnia  Endocrine:  no cold/heat intolerance  Heme: no ecchymosis, no easy bruising  Skin:  no rash, no edema      VITALS/PHYSICAL EXAM  --------------------------------------------------------------------------------  T(C): 36.6 (21 @ 04:00), Max: 37.1 (21 @ 20:00)  HR: 92 (21 @ 07:00) (70 - 92)  BP: 84/53 (21 @ 07:00) (84/53 - 84/53)  RR: 36 (21 @ 07:00) (16 - 39)  SpO2: 99% (21 @ 07:00) (93% - 100%)  Wt(kg): --      21 @ 07:01  -  21 @ 07:00  --------------------------------------------------------  IN: 1645 mL / OUT: 955 mL / NET: 690 mL      I&O's Detail    07 Sep 2021 07:01  -  08 Sep 2021 07:00  --------------------------------------------------------  IN:    Enteral Tube Flush: 240 mL    Miscellaneous Tube Feedin mL    multiple electrolytes Injection Type 1.: 60 mL    sodium chloride 0.9%: 210 mL  Total IN: 1645 mL    OUT:    Drain (mL): 305 mL    Voided (mL): 650 mL  Total OUT: 955 mL    Total NET: 690 mL          Physical Exam:  Gen: lying in bed in nad  HEENT: ncat, trachea midline +ngt  Chest: respirations non labored  Abd: soft non distended  LE: no edema  Neuro: awake alert appropriate        LABS/STUDIES  --------------------------------------------------------------------------------              7.9    12.93 >-----------<  238      [21 @ 00:32]              25.1     133  |  99  |  15  ----------------------------<  142      [21 @ 00:32]  4.1   |  25  |  0.47        Ca     9.6     [21 00:32]      Mg     1.7     [21 00:32]      Phos  2.9     [21 00:32]    TPro  7.0  /  Alb  3.0  /  TBili  0.4  /  DBili  x   /  AST  97  /  ALT  230  /  AlkPhos  260  [21 00:32]              [21 00:32]    Ca ionizedBlood Gas Arterial - Calcium, Ionized: 1.33 mmol/L (21 @ 00:14)  Blood Gas Arterial - Calcium, Ionized: 1.31 mmol/L (21 @ 00:09)    Creatinine Trend:  POC glucoseGlucose, Serum: 142 mg/dL (21 @ 00:32)  CAPILLARY BLOOD GLUCOSE      POCT Blood Glucose.: 119 mg/dL (08 Sep 2021 05:28)  POCT Blood Glucose.: 150 mg/dL (08 Sep 2021 00:02)  POCT Blood Glucose.: 112 mg/dL (07 Sep 2021 17:29)  POCT Blood Glucose.: 109 mg/dL (07 Sep 2021 12:59)    PrealbuminPrealbumin, Serum: 11 mg/dL (21 @ 04:01)  Prealbumin, Serum: 10 mg/dL (08-15-21 @ 13:53)    Triglycerides

## 2021-09-08 NOTE — PROGRESS NOTE ADULT - ASSESSMENT
Assessment and Recommendation:   · Assessment	  Assessment and recommendation :  Recurrent Acute hypoxic respiratory Failure S/P tracheostomy on trach. collar  50% FI02,   Acute blood loss anemia S/P multiple  blood transfusion   S/P septic shock off vasopressin , levophed and off  Dobutamine   S/P cholecystostomy tube placement by IR    AF RVR back to regular sinus Rhythm   Non ischemic cardiomyopathy continue ACE inhibitor and B-Blockers   S/P Septic shock and cardiogenic shock   Stage D systolic heart failure S/P LVAD HM2   MH2 LVAD  with  TV Annuloplasty  Severe peripheral vascular disease   severe hyperglycemia on insulin coverage    Reglan 10 mg for Gastric Motility   hyperthyroidism on Methimazole 10mg TID   critical care polyneuropathy   Anemia of Acute blood Loss   severe protein caloric malnutrition   S/P blood and FFP transfusion   Chronic kidney disease stage III  Depressed and withdrawn   resume  NGT feeding on Vital  advanced to 55 cc/ hr   Decannulation ?  GI prophylaxis with PPI

## 2021-09-08 NOTE — PROGRESS NOTE ADULT - PROBLEM SELECTOR PLAN 7
- endocrine recs appreciated  - currently on methimazole, propranolol, and steroids  - will need to obtain endocrine clearance prior to angiogram given his history of thyrotoxicosis on this admission - endocrine recs appreciated  - currently on methimazole, propranolol, and steroids  - Steroid taper as follows:   9/8, 9/9, 9/10: Hydrocortisone 50 mg q 12 hrs, stop after 6 doses  9/11, 9/12, 9/13: Hydrocortisone 25 mg q 12 hrs, stop after 6 doses  9/14, 9/15: Hydrocortisone 25 mg daily, stop after 2 doses  Stop steroids thereafter.   - will need to obtain endocrine clearance prior to angiogram/contrast exposure given his history of thyrotoxicosis on this admission

## 2021-09-08 NOTE — PROGRESS NOTE ADULT - ASSESSMENT
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 coli UTIs, driveline drainage (1/7/21) and COVID-19, here initially for GIB prolonged hospital course, s/p tracheostomy, acalculous cholecystitis s/p perc dawn. Patient has persistent intermittent abdominal pain. Per CTU team, pt has recently been refusing tube feeds and is being evaluated for chronic mesenteric ischemia vs SMA syndrome.  8/13 bld cxs positive. Consulted for TPN. Prealb 11, prior a1c 5.6, tg 141. 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 Tolerating feeds but with intermittent abd pain. SLP eval recommending NPO status.    -continue tube feeds- Vital 1.5 and advance to goal as tolerated (goal of 65 ml/hr) as per CTU team, no plans to initiate TPN at this time  -suspect uncontrolled hyperthyroidism contributing to metabolic issues ie. cAMP effects GI motility, hypercalcemia, tachycardia; with some clinical improvement, further management as per endo  -f/u RUQ US  -hypomagnesemia- rec replete as per primary  -h/o abdominal pain/vomiting- as above, Zofran prn for nausea; Reglan for gut motility; vascular input noted, planning for mesenteric angiogram  -anemia- rec iron supp when feasible  -management as per CTU; will remain available as needed    plan discussed with Dr. Soliz, agreeable with above    TPN pager [phone], spectra [id number]  M-F 6A-4P, Weekends and holidays 8A-12P

## 2021-09-08 NOTE — PROGRESS NOTE ADULT - SUBJECTIVE AND OBJECTIVE BOX
RICKY HAMPTON  MRN-[id number]  Patient is a 66y old  Male who presents with a chief complaint of Anemia, Supratherapeutic INR, Dark Stools (08 Sep 2021 07:42)    HPI:  65M PMH ACC/AHA stage D HF due to NICM HM2 LVAD , TV annuloplasty ring 17 as destination therapy due to severe peripheral artery disease with significant stenosis  SIADH, Depression, CKD-3 with hyperkalemia, past E. coli UTIs, driveline drainage (21) and COVID-19 (back in 2020)  He was recently seen in clinic where he complained of abdominal pain and dark stools w constipation back in May. He presents to [facility] ER today weakness and fatigue, moderate and + Black stools for three days, on coumadin secondary to warfarin use in the setting of an LVAD. Patient has required transfusions for GIB in the past. Mostly recently back in 2021 pt had anemia with dark stools. No interventions was done at that time. However Last Endoscopy was done in 2020 (negative). Today labs show patient is anemic with H/H of 4.5/16.3,. INR is 8.84 MAP in the 90s, Temp 35.1. He denies any chest pain, shortness of breath, dizziness, abd pain, nausea or vomiting.       (2021 16:57)      Surgery/Hospital Course:   admit for melena w/ anemia, INR 8.84   6/15 Capsul study (+) for small bowel bleed, balloon endoscopy (old blood in prox ileum); post EGD - septic w/ L opacity, re-intubated for concern for aspiration, TTE (Mod MR, decrease biV w/ interventricular septum boweing towards R)   bronch    +C Diff    CT C/A/P: Fluid filled colon which may be 2/2 rapid transit. Small bilateral pleffs with associates. Compressive atelectasis New ISABELLE & LLL  parenchymal opacities, suspicious for pneumonia. 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    #8 Shiley trach at bedside    LVAD speed increased to 9200   Bronch   TC since . Patient transferred to SDU.    INR today 2.64.  H&H 7.3/24 this AM.  Will repeat CBC at noon, and will send stool guaiac Patient with persistent abdominal tenderness, rate of tube feeds decreased.  No nausea/vomiting.     INR today 2.4. H&H 9.1/28.6 low flow overnight /N&V, refusing Tube feeds on D5 1/2 normal  @50 cc/hr   INR 2.69  H&H 7.7/.1 refusing Tube feeds on D5 1/2 normal  @50 cc/hr. This am + BM Melena Dr Oneill HF  aware- PRBC x1  GI team consulted -  NPO  plan on study in am-  D/w Dr Cadet Patient  to return to CTU for further management; 1PRBCS    Post op INR 2.2 today.  No bleeding. BC + for SM.  Pt is hypotensive requiring pressor and inotropic support.  ID follow up today on Cefepime will follow.   R PTC for PTX    CT C/A/P: sub q emphysema in R chest wall, GGO RUL, small ascites CTH negative; Abd US: GB thickening, pericholecystic fluid     Perchole drain in place continues to drain total output overnight 133.  Fever today 38.8    duplex LE negative    Patient with persistent abdominal pain, refusing tube feeds and medications, Psych consulted   CTA A/P ordered to r/o mesenteric ischemia 2/2 persistent anorexia, nausea, vomiting. Revealed:  Evaluation of the mesenteric vessels is limited by streak artifact from LVAD. There appears to be severe stenosis of the proximal SMA; abdominal mesenteric doppler is recommended for further evaluation. 2.  No small bowel findings to suggest acute mesenteric ischemia. 3.  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  8/15: Cultures resulted BC 1/2 +GPC in clusters, SC enterobacter; mesenteric duplex: borderline stenosis of proximal SMA  : CT C/A/P noncon: Nondular opacities in R lung apex w/cavitation, abd nl  :  Continue current care, treatment of thyrotoxicosis with medications as per endocrine, d/c ABX as per team.     Today:    REVIEW OF SYSTEMS:  Patient speaks over trach   Gen: No fever, leg pain improved  EYES/ENT: No visual changes;  No vertigo or throat pain   NECK: No pain   RES:  No shortness of breath or Cough  CARD: No chest pain   GI: +abdominal pain, denies nausea at this time  : No dysuria  NEURO: No weakness; + lower extremity pain, improved today  SKIN: No itching, rashes     ICU Vital Signs Last 24 Hrs  T(C): 36.6 (08 Sep 2021 04:00), Max: 37.1 (07 Sep 2021 20:00)  T(F): 97.8 (08 Sep 2021 04:00), Max: 98.8 (07 Sep 2021 20:00)  HR: 92 (08 Sep 2021 07:00) (70 - 92)  BP: 84/53 (08 Sep 2021 07:00) (84/53 - 84/53)  BP(mean): 64 (08 Sep 2021 07:00) (64 - 64)  ABP: 71/56 (08 Sep 2021 07:00) (67/55 - 210/210)  ABP(mean): 62 (08 Sep 2021 07:00) (60 - 210)  RR: 36 (08 Sep 2021 07:00) (16 - 39)  SpO2: 99% (08 Sep 2021 07:00) (93% - 100%)      Physical Exam:  Gen:  Awake and alert, Sitting in chair in NAD.  Psych: Mood greatly improved but waxes and wanes, more interactive and participating in care. Would like to be more included in bedside rounding.  CNS:  intact, nonfocal, responds to all commands  Neck: no JVD, +TC   RES : course breath sounds, no wheezing, moderate tan secretions able to cough most up on own         CVS: +LVAD hum   Abd: Soft, RUQ tenderness by perc sybil site. Sybil drain with bilious fluid. Positive BS throughout.  Skin: No rash  Ext:  no edema    ============================I/O===========================   I&O's Detail    07 Sep 2021 07:01  -  08 Sep 2021 07:00  --------------------------------------------------------  IN:    Enteral Tube Flush: 240 mL    Miscellaneous Tube Feedin mL    multiple electrolytes Injection Type 1.: 60 mL    sodium chloride 0.9%: 210 mL  Total IN: 1645 mL    OUT:    Drain (mL): 305 mL    Voided (mL): 650 mL  Total OUT: 955 mL    Total NET: 690 mL        ============================ LABS =========================                        7.9    12.93 )-----------( 238      ( 08 Sep 2021 00:32 )             25.1     -    133<L>  |  99  |  15  ----------------------------<  142<H>  4.1   |  25  |  0.47<L>    Ca    9.6      08 Sep 2021 00:32  Phos  2.9     -  Mg     1.7         TPro  7.0  /  Alb  3.0<L>  /  TBili  0.4  /  DBili  x   /  AST  97<H>  /  ALT  230<H>  /  AlkPhos  260<H>  08    LIVER FUNCTIONS - ( 08 Sep 2021 00:32 )  Alb: 3.0 g/dL / Pro: 7.0 g/dL / ALK PHOS: 260 U/L / ALT: 230 U/L / AST: 97 U/L / GGT: x             ABG - ( 08 Sep 2021 00:14 )  pH, Arterial: 7.40  pH, Blood: x     /  pCO2: 52    /  pO2: 139   / HCO3: 32    / Base Excess: 6.6   /  SaO2: 99.9                ======================Micro/Rad/Cardio=================  Culture: Reviewed   CXR: Reviewed  Echo:Reviewed  ======================================================  PAST MEDICAL & SURGICAL HISTORY:  CHF (congestive heart failure)    CAD (coronary artery disease)    Depression    Pleural effusion    History of 2019 novel coronavirus disease (COVID-19)  2020    Hemorrhoids    Bleeding hemorrhoids    Peripheral arterial disease    Claudication    BPH with urinary obstruction    ACC/AHA stage D systolic heart failure    Anticoagulation goal of INR 2.0 to 2.5    Falls    Clavicle fracture    CKD (chronic kidney disease), stage III    Iron deficiency anemia    H/O epistaxis    Vertigo    GI bleed    S/P TVR (tricuspid valve repair)    S/P ventricular assist device    S/P endoscopy      ====================ASSESSMENT ==============  Stage D Nonischemic Cardiomyopathy, Status Post HM2 on 2017    Cardiogenic shock  Hemodynamic instability   Acute hypoxemic respiratory failure s/p trach   GI bleed , Status Post Enteroscopy   Anemia, in setting of melena   Chronic Kidney Disease  Stress hyperglycemia   C.diff positive on    Hypovolemic shock  Septic shock  Leukocytosis  GB thickening/percholecystic s/p perc choley by IT   SMA stenosis  Serratia/citrobacter pneumonia   Stenotrophomonas pneumonia   Enterobacter pneumonia   Nasuea/vomiting  Deconditioning    Plan:  ====================== NEUROLOGY=====================  Nonfocal, continue to monitor neuro status   C/w mirtazapine and sertraline for depression  Deconditioned, PT/Ambulate as tolerated    mirtazapine 7.5 milliGRAM(s) Oral daily  sertraline 100 milliGRAM(s) Oral daily    ==================== RESPIRATORY======================  Acute hypoxemic respiratory failure s/p #8 shiley trach on , continue TC as tolerated (TC since )  Continue close monitoring of respiratory rate and breathing pattern, following of ABG’s with A-line monitoring, continuous pulse oximetry monitoring.   Moderate secretions, continue suctioning prn, pulmonary toileting.     ====================CARDIOVASCULAR==================  Stage D Nonischemic Cardiomyopathy, Status Post HM2 on 2017; LVAD settings 9200, flow 4.8  TTE : reveals at least moderate MR, mild AI, severe global LV syst dysfxn, dec RV fxn   Propranolol for rate control of HR 2/2 Hyperthyroidism, titrate as tolerated per Endo  Continue invasive hemodynamic monitoring     propranolol 40 milliGRAM(s) Oral every 8 hours    ===================HEMATOLOGIC/ONC ===================  Acute blood loss anemia, monitor H&H/Plts    Holding coumadin and ASA given recurrent GI bleeding. Continue to monitor LDH daily.    VTE prophylaxis, heparin   Injectable 5000 Unit(s) SubCutaneous every 8 hours    ===================== RENAL =========================  Continue to monitor I/Os, BUN/Creatinine, and urine output.   Euvolemic, even fluid balance, currently off diuretics.    Replete lytes PRN. Keep K> 4 and Mg >2.     ==================== GASTROINTESTINAL===================  S/p cholecystostomy tube placed on  with IR   CTA A/P : Evaluation of the mesenteric vessels is limited by streak artifact from LVAD. There appears to be severe stenosis of the proximal SMA; abdominal mesenteric doppler is recommended for further evaluation. 2.  No small bowel findings to suggest acute mesenteric ischemia. 3.  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  Mesenteric duplex on 8/15 borderline stenosis of proximal SMA. ?Mesenteric angiogram with vascular this coming week.  For repeat JASMIN US to reassess gallbladder with perc sybil tube  Vital 1.5 shantlele feeds - Continue increasing tube feeds 5cc/day for goal of 65cc/hr as tolerated  CT Abd/Pel on  without acute process   Reglan for gut motility  Zofran PRN nausea/vomiting     multivitamin 1 Tablet(s) Oral daily  GI prophylaxis, pantoprazole  Injectable 40 milliGRAM(s) IV Push every 12 hours  simethicone 80 milliGRAM(s) Chew every 8 hours PRN Gas  ondansetron Injectable 4 milliGRAM(s) IV Push every 6 hours PRN Nausea and/or Vomiting  metoclopramide Injectable 10 milliGRAM(s) IV Push every 8 hours  thiamine 100 milliGRAM(s) Oral daily    =======================    ENDOCRINE  =====================  Stress hyperglycemia, continue glucose control with admelog sliding scale and NPH     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 Will initiate today.      - C/w with hydrocortisone     cholestyramine Powder (Sugar-Free) 4 Gram(s) Oral every 12 hours  dextrose 40% Gel 15 Gram(s) Oral once  dextrose 50% Injectable 25 Gram(s) IV Push once  dextrose 50% Injectable 12.5 Gram(s) IV Push once  glucagon  Injectable 1 milliGRAM(s) IntraMuscular once  hydrocortisone sodium succinate Injectable 50 milliGRAM(s) IV Push two times a day  insulin lispro (ADMELOG) corrective regimen sliding scale   SubCutaneous every 6 hours  insulin NPH human recombinant 6 Unit(s) SubCutaneous <User Schedule>  methimazole 20 milliGRAM(s) Oral <User Schedule>    ========================INFECTIOUS DISEASE================  Afebrile, WBC rising 8.44->12.93   Continue trending WBC and monitoring fever curve   Culture from sybil drain on : NGTD  SCx on  +rare gram positive cocci and gram variable rods   ID following, MD Prater, appreciate recommendations      Patient requires continuous monitoring with bedside rhythm monitoring, pulse ox monitoring, and intermittent blood gas analysis. Care plan discussed with ICU care team. Patient remained critical and at risk for life threatening decompensation.     By signing my name below, I, Agnieszka Cherry, attest that this documentation has been prepared under the direction and in the presence of Jaclyn Mendoza NP   Electronically signed: Cesia Shaffer, 21 @ 07:58    I, Jaclyn Mendoza, personally performed the services described in this documentation. all medical record entries made by the scribe were at my direction and in my presence. I have reviewed the chart and agree that the record reflects my personal performance and is accurate and complete  Electronically signed: Jaclyn Mendoza NP        RICKY HAMPTON  MRN-[id number]  Patient is a 66y old  Male who presents with a chief complaint of Anemia, Supratherapeutic INR, Dark Stools (08 Sep 2021 07:42)    HPI:  65M PMH ACC/AHA stage D HF due to NICM HM2 LVAD , TV annuloplasty ring 17 as destination therapy due to severe peripheral artery disease with significant stenosis  SIADH, Depression, CKD-3 with hyperkalemia, past E. coli UTIs, driveline drainage (21) and COVID-19 (back in 2020)  He was recently seen in clinic where he complained of abdominal pain and dark stools w constipation back in May. He presents to [facility] ER today weakness and fatigue, moderate and + Black stools for three days, on coumadin secondary to warfarin use in the setting of an LVAD. Patient has required transfusions for GIB in the past. Mostly recently back in 2021 pt had anemia with dark stools. No interventions was done at that time. However Last Endoscopy was done in 2020 (negative). Today labs show patient is anemic with H/H of 4.5/16.3,. INR is 8.84 MAP in the 90s, Temp 35.1. He denies any chest pain, shortness of breath, dizziness, abd pain, nausea or vomiting.       (2021 16:57)      Surgery/Hospital Course:   admit for melena w/ anemia, INR 8.84   6/15 Capsul study (+) for small bowel bleed, balloon endoscopy (old blood in prox ileum); post EGD - septic w/ L opacity, re-intubated for concern for aspiration, TTE (Mod MR, decrease biV w/ interventricular septum boweing towards R)   bronch    +C Diff    CT C/A/P: Fluid filled colon which may be 2/2 rapid transit. Small bilateral pleffs with associates. Compressive atelectasis New ISABELLE & LLL  parenchymal opacities, suspicious for pneumonia. 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    #8 Shiley trach at bedside    LVAD speed increased to 9200   Bronch   TC since . Patient transferred to SDU.    INR today 2.64.  H&H 7.3/24 this AM.  Will repeat CBC at noon, and will send stool guaiac Patient with persistent abdominal tenderness, rate of tube feeds decreased.  No nausea/vomiting.     INR today 2.4. H&H 9.1/28.6 low flow overnight /N&V, refusing Tube feeds on D5 1/2 normal  @50 cc/hr   INR 2.69  H&H 7.7/.1 refusing Tube feeds on D5 1/2 normal  @50 cc/hr. This am + BM Melena Dr Oneill HF  aware- PRBC x1  GI team consulted -  NPO  plan on study in am-  D/w Dr Cadet Patient  to return to CTU for further management; 1PRBCS    Post op INR 2.2 today.  No bleeding. BC + for SM.  Pt is hypotensive requiring pressor and inotropic support.  ID follow up today on Cefepime will follow.   R PTC for PTX    CT C/A/P: sub q emphysema in R chest wall, GGO RUL, small ascites CTH negative; Abd US: GB thickening, pericholecystic fluid     Perchole drain in place continues to drain total output overnight 133.  Fever today 38.8    duplex LE negative    Patient with persistent abdominal pain, refusing tube feeds and medications, Psych consulted   CTA A/P ordered to r/o mesenteric ischemia 2/2 persistent anorexia, nausea, vomiting. Revealed:  Evaluation of the mesenteric vessels is limited by streak artifact from LVAD. There appears to be severe stenosis of the proximal SMA; abdominal mesenteric doppler is recommended for further evaluation. 2.  No small bowel findings to suggest acute mesenteric ischemia. 3.  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  8/15: Cultures resulted BC 1/2 +GPC in clusters, SC enterobacter; mesenteric duplex: borderline stenosis of proximal SMA  : CT C/A/P noncon: Nondular opacities in R lung apex w/cavitation, abd nl  :  Continue current care, treatment of thyrotoxicosis with medications as per endocrine, d/c ABX as per team.     Today:  Tolerating Vital 1.5 shantelle feeds, continue increasing tube feeds 5cc/day for goal of 65cc/hr as tolerated. Pending repeat RUQ US to reassess gallbladder with perc sybil tube. Patient cleared for OR for mesenteric angiogram with vascular. SCx on  +rare gram positive cocci and gram variable rods, will send repeat SCx today. Will f/u with endocrinology re: need for Cholestyramine.     REVIEW OF SYSTEMS:  Patient speaks over trach   Gen: No fever, leg pain improved  EYES/ENT: No visual changes;  No vertigo or throat pain   NECK: No pain   RES:  No shortness of breath or Cough  CARD: No chest pain   GI: +abdominal pain, denies nausea at this time  : No dysuria  NEURO: No weakness; + lower extremity pain, improving  SKIN: No itching, rashes     ICU Vital Signs Last 24 Hrs  T(C): 36.6 (08 Sep 2021 04:00), Max: 37.1 (07 Sep 2021 20:00)  T(F): 97.8 (08 Sep 2021 04:00), Max: 98.8 (07 Sep 2021 20:00)  HR: 92 (08 Sep 2021 07:00) (70 - 92)  BP: 84/53 (08 Sep 2021 07:00) (84/53 - 84/53)  BP(mean): 64 (08 Sep 2021 07:00) (64 - 64)  ABP: 71/56 (08 Sep 2021 07:00) (67/55 - 210/210)  ABP(mean): 62 (08 Sep 2021 07:00) (60 - 210)  RR: 36 (08 Sep 2021 07:00) (16 - 39)  SpO2: 99% (08 Sep 2021 07:00) (93% - 100%)      Physical Exam:  Gen:  Awake and alert, Sitting in chair in NAD.  Psych: Mood greatly improved but waxes and wanes, more interactive and participating in care. Would like to be more included in bedside rounding.  CNS:  intact, nonfocal, responds to all commands  Neck: no JVD, +TC   RES : course breath sounds, no wheezing, moderate tan secretions able to cough most up on own         CVS: +LVAD hum   Abd: Soft, RUQ tenderness by perc sybil site. Sybil drain with bilious fluid. Positive BS throughout.  Skin: No rash  Ext:  no edema    ============================I/O===========================   I&O's Detail    07 Sep 2021 07:01  -  08 Sep 2021 07:00  --------------------------------------------------------  IN:    Enteral Tube Flush: 240 mL    Miscellaneous Tube Feedin mL    multiple electrolytes Injection Type 1.: 60 mL    sodium chloride 0.9%: 210 mL  Total IN: 1645 mL    OUT:    Drain (mL): 305 mL    Voided (mL): 650 mL  Total OUT: 955 mL    Total NET: 690 mL        ============================ LABS =========================                        7.9    12.93 )-----------( 238      ( 08 Sep 2021 00:32 )             25.1     09-08    133<L>  |  99  |  15  ----------------------------<  142<H>  4.1   |  25  |  0.47<L>    Ca    9.6      08 Sep 2021 00:32  Phos  2.9     -  Mg     1.7         TPro  7.0  /  Alb  3.0<L>  /  TBili  0.4  /  DBili  x   /  AST  97<H>  /  ALT  230<H>  /  AlkPhos  260<H>      LIVER FUNCTIONS - ( 08 Sep 2021 00:32 )  Alb: 3.0 g/dL / Pro: 7.0 g/dL / ALK PHOS: 260 U/L / ALT: 230 U/L / AST: 97 U/L / GGT: x             ABG - ( 08 Sep 2021 00:14 )  pH, Arterial: 7.40  pH, Blood: x     /  pCO2: 52    /  pO2: 139   / HCO3: 32    / Base Excess: 6.6   /  SaO2: 99.9                ======================Micro/Rad/Cardio=================  Culture: Reviewed   CXR: Reviewed  Echo:Reviewed  ======================================================  PAST MEDICAL & SURGICAL HISTORY:  CHF (congestive heart failure)    CAD (coronary artery disease)    Depression    Pleural effusion    History of  novel coronavirus disease (COVID-19)  2020    Hemorrhoids    Bleeding hemorrhoids    Peripheral arterial disease    Claudication    BPH with urinary obstruction    ACC/AHA stage D systolic heart failure    Anticoagulation goal of INR 2.0 to 2.5    Falls    Clavicle fracture    CKD (chronic kidney disease), stage III    Iron deficiency anemia    H/O epistaxis    Vertigo    GI bleed    S/P TVR (tricuspid valve repair)    S/P ventricular assist device    S/P endoscopy      ====================ASSESSMENT ==============  Stage D Nonischemic Cardiomyopathy, Status Post HM2 on 2017    Cardiogenic shock  Hemodynamic instability   Acute hypoxemic respiratory failure s/p trach   GI bleed , Status Post Enteroscopy   Anemia, in setting of melena   Chronic Kidney Disease  Stress hyperglycemia   C.diff positive on    Hypovolemic shock  Septic shock  Leukocytosis  GB thickening/percholecystic s/p perc choley by IT   SMA stenosis  Serratia/citrobacter pneumonia   Stenotrophomonas pneumonia   Enterobacter pneumonia   Nasuea/vomiting  Deconditioning    Plan:  ====================== NEUROLOGY=====================  Nonfocal, continue to monitor neuro status   C/w mirtazapine and sertraline for depression  Deconditioned, PT/Ambulate as tolerated    mirtazapine 7.5 milliGRAM(s) Oral daily  sertraline 100 milliGRAM(s) Oral daily    ==================== RESPIRATORY======================  Acute hypoxemic respiratory failure s/p #8 shiley trach on , continue TC as tolerated (TC since )  Continue close monitoring of respiratory rate and breathing pattern, following of ABG’s with A-line monitoring, continuous pulse oximetry monitoring.   Moderate secretions, continue suctioning prn, pulmonary toileting.     ====================CARDIOVASCULAR==================  Stage D Nonischemic Cardiomyopathy, Status Post HM2 on 2017; LVAD settings 9200, flow 4.8  TTE : reveals at least moderate MR, mild AI, severe global LV syst dysfxn, dec RV fxn   Propranolol for rate control of HR 2/2 Hyperthyroidism, titrate as tolerated per Endo  Continue invasive hemodynamic monitoring     propranolol 40 milliGRAM(s) Oral every 8 hours    ===================HEMATOLOGIC/ONC ===================  Acute blood loss anemia, monitor H&H/Plts    Holding coumadin and ASA given recurrent GI bleeding. Continue to monitor LDH daily.    VTE prophylaxis, heparin   Injectable 5000 Unit(s) SubCutaneous every 8 hours    ===================== RENAL =========================  Continue to monitor I/Os, BUN/Creatinine, and urine output.   Euvolemic, even fluid balance, currently off diuretics.    Replete lytes PRN. Keep K> 4 and Mg >2.     ==================== GASTROINTESTINAL===================  S/p cholecystostomy tube placed on  with IR   CTA A/P : Evaluation of the mesenteric vessels is limited by streak artifact from LVAD. There appears to be severe stenosis of the proximal SMA; abdominal mesenteric doppler is recommended for further evaluation. 2.  No small bowel findings to suggest acute mesenteric ischemia. 3.  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  Mesenteric duplex on 8/15 borderline stenosis of proximal SMA. Patient cleared for OR for mesenteric angiogram with vascular.   Pending repeat RUQ US to reassess gallbladder with perc sybil tube  Vital 1.5 shantelle feeds - Continue increasing tube feeds 5cc/day for goal of 65cc/hr as tolerated  CT Abd/Pel on  without acute process   Reglan for gut motility  Zofran PRN nausea/vomiting     multivitamin 1 Tablet(s) Oral daily  GI prophylaxis, pantoprazole  Injectable 40 milliGRAM(s) IV Push every 12 hours  simethicone 80 milliGRAM(s) Chew every 8 hours PRN Gas  ondansetron Injectable 4 milliGRAM(s) IV Push every 6 hours PRN Nausea and/or Vomiting  metoclopramide Injectable 10 milliGRAM(s) IV Push every 8 hours  thiamine 100 milliGRAM(s) Oral daily    =======================    ENDOCRINE  =====================  Stress hyperglycemia, continue glucose control with admelog sliding scale and NPH     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 - Free T4 remains elevated   Per endocrine      - Thyroid US when trach is out      - Propanolol as above for optimal T4-->T3 conversion      - c/w Methimazole ---> consider changing to rectal if continues to have emesis for better absorption      - Per GI, no contraindications for cholestyramine with sybil drain; will f/u with endocrinology re: need for Cholestyramine       - C/w with hydrocortisone    cholestyramine Powder (Sugar-Free) 4 Gram(s) Oral every 12 hours  dextrose 40% Gel 15 Gram(s) Oral once  dextrose 50% Injectable 25 Gram(s) IV Push once  dextrose 50% Injectable 12.5 Gram(s) IV Push once  glucagon  Injectable 1 milliGRAM(s) IntraMuscular once  hydrocortisone sodium succinate Injectable 50 milliGRAM(s) IV Push two times a day  insulin lispro (ADMELOG) corrective regimen sliding scale   SubCutaneous every 6 hours  insulin NPH human recombinant 6 Unit(s) SubCutaneous <User Schedule>  methimazole 20 milliGRAM(s) Oral <User Schedule>    ========================INFECTIOUS DISEASE================  Afebrile, WBC rising 8.44->12.93   Continue trending WBC and monitoring fever curve   Culture from sybil drain on : NGTD  SCx on  +rare gram positive cocci and gram variable rods, will send repeat SCx today   ID following, MD Prater, appreciate recommendations      Patient requires continuous monitoring with bedside rhythm monitoring, pulse ox monitoring, and intermittent blood gas analysis. Care plan discussed with ICU care team. Patient remained critical and at risk for life threatening decompensation.     By signing my name below, I, Agnieszka Cherry, attest that this documentation has been prepared under the direction and in the presence of Jaclyn Mendoza NP   Electronically signed: Cesia Shaffer, 21 @ 07:58    I, Jaclyn Mendoza, personally performed the services described in this documentation. all medical record entries made by the luisibmel were at my direction and in my presence. I have reviewed the chart and agree that the record reflects my personal performance and is accurate and complete  Electronically signed: Jaclyn Mendoza NP

## 2021-09-08 NOTE — PROGRESS NOTE ADULT - SUBJECTIVE AND OBJECTIVE BOX
INFECTIOUS DISEASES FOLLOW UP-- Anitra Prater  [phone]    This is a follow up note for this  66yMale with  Anemia  prolonged hospitalization  trach collar  spirits brighter today- walked with assistance        ROS:  CONSTITUTIONAL: sleepy    Allergies    Amiodarone Hydrochloride (Other (Severe))    Intolerances        ANTIBIOTICS/RELEVANT:  antimicrobials    immunologic:    OTHER:  dextrose 40% Gel 15 Gram(s) Oral once  dextrose 50% Injectable 25 Gram(s) IV Push once  dextrose 50% Injectable 12.5 Gram(s) IV Push once  glucagon  Injectable 1 milliGRAM(s) IntraMuscular once  heparin   Injectable 5000 Unit(s) SubCutaneous every 8 hours  hydrocortisone sodium succinate Injectable 50 milliGRAM(s) IV Push two times a day  insulin lispro (ADMELOG) corrective regimen sliding scale   SubCutaneous every 6 hours  insulin NPH human recombinant 6 Unit(s) SubCutaneous <User Schedule>  lidocaine   4% Patch 1 Patch Transdermal daily  methimazole 20 milliGRAM(s) Oral <User Schedule>  metoclopramide Injectable 10 milliGRAM(s) IV Push every 8 hours  mirtazapine 7.5 milliGRAM(s) Oral daily  multivitamin 1 Tablet(s) Oral daily  ondansetron Injectable 4 milliGRAM(s) IV Push every 6 hours PRN  pantoprazole  Injectable 40 milliGRAM(s) IV Push every 12 hours  propranolol 40 milliGRAM(s) Oral every 8 hours  sertraline 100 milliGRAM(s) Oral daily  simethicone 80 milliGRAM(s) Chew every 8 hours PRN  sodium chloride 0.9%. 1000 milliLiter(s) IV Continuous <Continuous>  thiamine 100 milliGRAM(s) Oral daily      Objective:  Vital Signs Last 24 Hrs  T(C): 35.9 (08 Sep 2021 12:00), Max: 37.1 (07 Sep 2021 20:00)  T(F): 96.7 (08 Sep 2021 12:00), Max: 98.8 (07 Sep 2021 20:00)  HR: 99 (08 Sep 2021 18:00) (72 - 99)  BP: 87/69 (08 Sep 2021 18:00) (81/60 - 94/60)  BP(mean): 74 (08 Sep 2021 18:00) (64 - 74)  RR: 47 (08 Sep 2021 18:00) (19 - 47)  SpO2: 100% (08 Sep 2021 18:00) (93% - 100%)    PHYSICAL EXAM:  Constitutional:no acute distress  Eyes:ALONSO, EOMI  Ear/Nose/Throat: no oral lesions,trachs ite minimal drainage 	  Respiratory: clear BL  Cardiovascular: S1S2 VAD sounds  Gastrointestinal:soft, (+) BS, no tenderness cholecystotomy tube  Extremities:no e/e/c  No Lymphadenopathy  IV sites not inflammed.    LABS:                        7.9    12.93 )-----------( 238      ( 08 Sep 2021 00:32 )             25.1     09-08    133<L>  |  99  |  15  ----------------------------<  142<H>  4.1   |  25  |  0.47<L>    Ca    9.6      08 Sep 2021 00:32  Phos  2.9     09-08  Mg     1.7     09-08    TPro  7.0  /  Alb  3.0<L>  /  TBili  0.4  /  DBili  x   /  AST  97<H>  /  ALT  230<H>  /  AlkPhos  260<H>  09-08          MICROBIOLOGY:            RECENT CULTURES:  09-08 @ 14:58  .Sputum Sputum  --  --  --  --  --  09-07 @ 18:32  .Sputum Sputum  --  --  --    Normal Respiratory Bria present  --  09-02 @ 16:29  .Sputum Sputum  --  --  --    Normal Respiratory Bria present  --  09-02 @ 04:56  .Blood Blood-Peripheral  --  --  --    No Growth Final  --      RADIOLOGY & ADDITIONAL STUDIES:    < from: US Abdomen Upper Quadrant Right (09.08.21 @ 16:20) >    Liver: Within normal limits.  Bile ducts: Normal caliber. Common bile duct measures 3 mm.  Gallbladder: Partially imaged. Contracted around a cholecystostomy tube.  Pancreas: Not visualized  IVC: Visualized portions are within normal limits.    IMPRESSION:    Contracted gallbladder with cholecystostomy tube in place.    < end of copied text >

## 2021-09-08 NOTE — PROGRESS NOTE ADULT - ASSESSMENT
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 coli UTIs, driveline drainage (1/7/21) and COVID-19 (back in April 2020)  He was recently seen in clinic where he complained of abdominal pain and dark stools w constipation back in May. He presents to [facility] ER today weakness and fatigue, moderate and + Black stools for three days, on coumadin secondary to warfarin use in the setting of an LVAD. Patient has required transfusions for GIB in the past. Mostly recently back in jan 2021 pt had anemia with dark stools. No interventions was done at that time. However Last Endoscopy was done in July 2020 (negative). Today labs show patient is anemic with H/H of 4.5/16.3,. INR is 8.84 MAP in the 90s,   Returned from endoscopy appearing ill tachypneic with chills with new white out of his left lung      afebrile and appears stable  recent sputums with normal cornelia  would favor continued monitoring off antimicrobials unless clinical condition changes  if repeat sputum is negative for MDRO organism can discontinue isolation        Morris Prater MD  [phone]  After 5pm/weekends [phone]

## 2021-09-08 NOTE — PROGRESS NOTE ADULT - PROBLEM SELECTOR PLAN 3
- Chronic in nature with questionable SMA stenosis on imaging and mesenteric duplex difficult to interpret with continuous flow. Holding off on mesenteric angiogram at this time. Will need to discuss risk vs benefits of obtaining given his recent diagnosis of thyrotoxicosis.   - Continue to advance feeds as tolerates, goal rate 65 cc/hr  - Discussed cholecystostomy with GI surgery, unlikely to derive symptomatic benefit since perc dawn tube continues to drain. Repeat RUQ u/s pending to ensure gall bladder is decompressed

## 2021-09-09 NOTE — CHART NOTE - NSCHARTNOTEFT_GEN_A_CORE
Vascular Surgery Pre-Op Checklist        Diagnosis:   Procedure:   Surgeon:        Labs:                          7.8    13.65 )-----------( 208      ( 09 Sep 2021 08:34 )             25.5   09-09    136  |  100  |  14  ----------------------------<  135<H>  3.8   |  27  |  0.46<L>    Ca    9.4      09 Sep 2021 08:34  Phos  2.9     09-09  Mg     1.7     09-09    TPro  7.2  /  Alb  2.9<L>  /  TBili  0.4  /  DBili  x   /  AST  63<H>  /  ALT  194<H>  /  AlkPhos  255<H>  09-09      MEDICATIONS  (STANDING):  dextrose 40% Gel 15 Gram(s) Oral once  dextrose 50% Injectable 25 Gram(s) IV Push once  dextrose 50% Injectable 12.5 Gram(s) IV Push once  glucagon  Injectable 1 milliGRAM(s) IntraMuscular once  heparin   Injectable 5000 Unit(s) SubCutaneous every 8 hours  hydrocortisone sodium succinate Injectable 50 milliGRAM(s) IV Push two times a day  insulin lispro (ADMELOG) corrective regimen sliding scale   SubCutaneous every 6 hours  insulin NPH human recombinant 6 Unit(s) SubCutaneous <User Schedule>  lidocaine   4% Patch 1 Patch Transdermal daily  methimazole 20 milliGRAM(s) Oral <User Schedule>  metoclopramide Injectable 10 milliGRAM(s) IV Push every 8 hours  mirtazapine 7.5 milliGRAM(s) Oral daily  multivitamin 1 Tablet(s) Oral daily  pantoprazole  Injectable 40 milliGRAM(s) IV Push every 12 hours  propranolol 40 milliGRAM(s) Oral every 8 hours  sertraline 100 milliGRAM(s) Oral daily  sodium chloride 0.9%. 1000 milliLiter(s) (10 mL/Hr) IV Continuous <Continuous>  thiamine 100 milliGRAM(s) Oral daily    MEDICATIONS  (PRN):  ondansetron Injectable 4 milliGRAM(s) IV Push every 6 hours PRN Nausea and/or Vomiting  simethicone 80 milliGRAM(s) Chew every 8 hours PRN Gas      Pre-Op Checklist  [X] NPO after midnight  [X] IVF  [X] AM CBC -- ordered  [X] AM BMP -- ordered  [X] AM PT, PTT, INR -- ordered  [ ] COVID -- received by lab  [X] Documentation of endo optimization -- endo note   [X] Documentation of cardiac optimization -- heart failure team note  [  ] Documentation of pulmonary optimization -- pending, pulm team Dr. Marshall has been notified  [  ] Documentation if Plavix acceptable (if stent needed) by GI given recent GI bleeds -- pending, GI team has been notified by CT ICU  [X] Consent Vascular Surgery Pre-Op Checklist    Diagnosis: Possible SMA stenosis  Procedure: Mesenteric angiogram  Surgeon: Dr. Bran      Labs:                          7.8    13.65 )-----------( 208      ( 09 Sep 2021 08:34 )             25.5   09-09    136  |  100  |  14  ----------------------------<  135<H>  3.8   |  27  |  0.46<L>    Ca    9.4      09 Sep 2021 08:34  Phos  2.9     09-09  Mg     1.7     09-09    TPro  7.2  /  Alb  2.9<L>  /  TBili  0.4  /  DBili  x   /  AST  63<H>  /  ALT  194<H>  /  AlkPhos  255<H>  09-09      MEDICATIONS  (STANDING):  dextrose 40% Gel 15 Gram(s) Oral once  dextrose 50% Injectable 25 Gram(s) IV Push once  dextrose 50% Injectable 12.5 Gram(s) IV Push once  glucagon  Injectable 1 milliGRAM(s) IntraMuscular once  heparin   Injectable 5000 Unit(s) SubCutaneous every 8 hours  hydrocortisone sodium succinate Injectable 50 milliGRAM(s) IV Push two times a day  insulin lispro (ADMELOG) corrective regimen sliding scale   SubCutaneous every 6 hours  insulin NPH human recombinant 6 Unit(s) SubCutaneous <User Schedule>  lidocaine   4% Patch 1 Patch Transdermal daily  methimazole 20 milliGRAM(s) Oral <User Schedule>  metoclopramide Injectable 10 milliGRAM(s) IV Push every 8 hours  mirtazapine 7.5 milliGRAM(s) Oral daily  multivitamin 1 Tablet(s) Oral daily  pantoprazole  Injectable 40 milliGRAM(s) IV Push every 12 hours  propranolol 40 milliGRAM(s) Oral every 8 hours  sertraline 100 milliGRAM(s) Oral daily  sodium chloride 0.9%. 1000 milliLiter(s) (10 mL/Hr) IV Continuous <Continuous>  thiamine 100 milliGRAM(s) Oral daily    MEDICATIONS  (PRN):  ondansetron Injectable 4 milliGRAM(s) IV Push every 6 hours PRN Nausea and/or Vomiting  simethicone 80 milliGRAM(s) Chew every 8 hours PRN Gas      Pre-Op Checklist  [X] NPO after midnight  [X] IVF  [X] AM CBC -- ordered  [X] AM BMP -- ordered  [X] AM PT, PTT, INR -- ordered  [ ] COVID -- received by lab  [X] Documentation of endo optimization -- endo note   [X] Documentation of cardiac optimization -- heart failure team note  [  ] Documentation of pulmonary optimization -- pending, pulm team Dr. Marshall has been notified  [X] Documentation if Plavix acceptable (if stent needed) by GI given recent GI bleeds -- pending, GI team has been notified by CT ICU. CT ICU has also provided clearance for this via telephone communication on 20:28 on 9/9/21.  [X] Consent

## 2021-09-09 NOTE — CHART NOTE - NSCHARTNOTEFT_GEN_A_CORE
Nutrition Follow Up Note  Patient seen for: Malnutrition follow-up    Chart reviewed, events noted. 66M, PMH ACC/AHA stage D HF 2/2 NICM HM2 LVAD, TV annuloplasty ring 17 as destination therapy due to severe PAD with significant stenosis  SIADH, Depression, CKD-3, COVID-19, here initially for GIB prolonged hospital course, s/p tracheostomy, acalculous cholecystitis s/p perc dawn. Patient c persistent intermittent abdominal pain. Per Team, pt has recently been refusing tube feeds and is being evaluated for chronic mesenteric ischemia vs SMA syndrome. Tolerating TC     Source: EMR, Team/HF Rounds    Diet, NPO with Tube Feed:   Tube Feeding Modality: Nasogastric  Vital 1.5 Tank (VITAL1.5RTH)  Total Volume for 24 Hours (mL): 1560  Continuous  Starting Tube Feed Rate {mL per Hour}: 10  Increase Tube Feed Rate by (mL): 5     Every 24 hours  Until Goal Tube Feed Rate (mL per Hour): 65  Tube Feed Duration (in Hours): 24  Tube Feed Start Time: 11:45  Supplement Feeding Modality:  Nasogastric  Probiotic Yogurt/Smoothie Cans or Servings Per Day:  2       Frequency:  Daily (21 @ 00:07)    Current EN regimen provides: 1560ml formula, 2340kcals, 105g protein, 1192ml free H2O    EN provisions per chart:  Currently infusing @55ml/hr - slowly advancing to goal per RN  ()  ()  ()  ()  ()       Nutrition-related concerns:  - RN notes pt currently with post-pyloric tube, states EN has remained at 55ml/hr 2/2 abdominal pain; RN reports no emesis - per chart last emesis noted .  - Pt previously receiving TwoCal; discussion with Team on  to switch to Vital 1.5 s/p episode of emesis x2  - Biliary drain in place  - Reglan ordered daily  - Multivitamin and thiamin supplementation ordered daily  - NPH & Insulin Sliding Scale ordered to optimize BG; noted to be receiving steroids    GI:  Last BM .   Bowel Regimen? [] Yes   [x] No  - Zofran ordered PRN    Weight in k.5 (), 61.4 (), 60.4 (), 59 (), 58.6 (), 59 (), 58.4 (), 63 (), 66.2 (), 67.7 (), 71.2 ()    Weight history:   - Admission weight: 176.3 pounds / 80 kg (6/15)  significant wt loss noted; wt trending up    MEDICATIONS  (STANDING):  dextrose 40% Gel  dextrose 50% Injectable  dextrose 50% Injectable  glucagon  Injectable  hydrocortisone sodium succinate Injectable  insulin lispro (ADMELOG) corrective regimen sliding scale  insulin NPH human recombinant  methimazole  multivitamin  pantoprazole  Injectable  propranolol  sodium chloride 0.9%.  thiamine    Pertinent Labs:  @ 08:34: Na 136, BUN 14, Cr 0.46<L>, <H>, K+ 3.8, Phos 2.9, Mg 1.7, Alk Phos 255<H>, ALT/SGPT 194<H>, AST/SGOT 63<H>, HbA1c --    A1C with Estimated Average Glucose Result: 5.4 % (08-15-21 @ 13:52)  A1C with Estimated Average Glucose Result: 5.6 % (06-15-21 @ 02:42)    Finger Sticks:  POCT Blood Glucose.: 103 mg/dL ( @ 11:11)  POCT Blood Glucose.: 133 mg/dL ( @ 06:02)  POCT Blood Glucose.: 125 mg/dL ( @ 03:13)  POCT Blood Glucose.: 88 mg/dL ( @ 00:49)  POCT Blood Glucose.: 118 mg/dL ( @ 17:50)    Skin per nursing documentation: no pressure injuries   Edema: none at this time     Estimated Nutrition Needs:   Using dosing weight 78.5 kg  Energy Needs (25-30 kcal/kg): 3117-2681 kcal/day  Protein Needs (1.2-1.4 g/kg):     Previous Nutrition Diagnosis: Severe chronic malnutrition  Nutrition diagnosis is ongoing & addressed with tube feeds as tolerated    No new nutrition diagnosis at this time    Recommendations:    1. Recommend continue current EN regimen via post-pyloric tube, increasing feeds as tolerated to Vital 1.5 goal rate of 65ml/hr x 24hrs. At goal to provide 1560ml formula, 2340kcals, 105g protein, 1192ml free H2O (meets ~30kcals/kg & 1.33g protein/kg based on dosing wt 78.5kg).  2. Continue micronutrient supplementation as ordered  3. RD to remain available to adjust EN formulary, volume/rate PRN.     Monitoring and Evaluation:   Continue to monitor Nutritional intake, Tolerance to diet prescription, weights, labs, skin integrity  RD remains available upon request and will follow up per protocol     Wendy Travis, MS, RD, CDN, [facility]  pager #[phone] Nutrition Follow Up Note  Patient seen for: Malnutrition follow-up    Chart reviewed, events noted. 66M, PMH ACC/AHA stage D HF 2/2 NICM s/p HM2 LVAD (2017). Here initially for GIB prolonged hospital course, s/p tracheostomy, acalculous cholecystitis s/p perc dawn. Patient c persistent intermittent abdominal pain, per team, possible mesenteric ischemia from possible SMA stenosis - Tentative plan for mesenteric angiogram next week. Tolerating TC     Source: EMR, Team/HF Rounds    Diet, NPO with Tube Feed:   Tube Feeding Modality: Nasogastric  Vital 1.5 Tank (VITAL1.5RTH)  Total Volume for 24 Hours (mL): 1560  Continuous  Starting Tube Feed Rate {mL per Hour}: 10  Increase Tube Feed Rate by (mL): 5     Every 24 hours  Until Goal Tube Feed Rate (mL per Hour): 65  Tube Feed Duration (in Hours): 24  Tube Feed Start Time: 11:45  Supplement Feeding Modality:  Nasogastric  Probiotic Yogurt/Smoothie Cans or Servings Per Day:  2       Frequency:  Daily (21 @ 00:07)    Current EN regimen provides: 1560ml formula, 2340kcals, 105g protein, 1192ml free H2O    EN provisions per chart:  Currently infusing @55ml/hr - slowly advancing to goal per RN  () 81% EN goal volume achieved - rate increased to 55ml/hr @ 11:00  () 70% EN goal volume achieved - rate increased to 50ml/hr @ 11:00  () 66% EN goal volume achieved - rate increased to 45ml/hr @ 11:00  () 58% EN goal volume achieved - rate increased to 40ml/hr @ 11:00  () 51% EN goal volume achieved - rate increased to 35ml/hr @ 9:00  - Pt has received 65% EN provisions in the last 5 days      Nutrition-related concerns:  - Team has been increasing feeds by 5ml/day  - RN notes pt currently with post-pyloric tube, states EN has remained at 55ml/hr 2/2 abdominal pain; RN reports no emesis - per chart last emesis noted .  - Pt previously receiving TwoCal; discussion with Team on  to switch to Vital 1.5 s/p episode of emesis x2  - Biliary drain in place  - Reglan ordered daily  - Multivitamin and thiamin supplementation ordered daily  - NPH & Insulin Sliding Scale ordered to optimize BG; noted to be receiving steroids    GI:  Last BM .   Bowel Regimen? [] Yes   [x] No  - Zofran ordered PRN    Weight in k.5 (-), 61.4 (), 60.4 (), 59 (), 58.6 (), 59 (), 58.4 (), 63 (), 66.2 (), 67.7 (), 71.2 ()    Weight history:   - Admission weight: 176.3 pounds / 80 kg (6/15)  significant wt loss noted; wt trending up    MEDICATIONS  (STANDING):  dextrose 40% Gel  dextrose 50% Injectable  dextrose 50% Injectable  glucagon  Injectable  hydrocortisone sodium succinate Injectable  insulin lispro (ADMELOG) corrective regimen sliding scale  insulin NPH human recombinant  methimazole  multivitamin  pantoprazole  Injectable  propranolol  sodium chloride 0.9%.  thiamine    Pertinent Labs:  @ 08:34: Na 136, BUN 14, Cr 0.46<L>, <H>, K+ 3.8, Phos 2.9, Mg 1.7, Alk Phos 255<H>, ALT/SGPT 194<H>, AST/SGOT 63<H>, HbA1c --    A1C with Estimated Average Glucose Result: 5.4 % (08-15-21 @ 13:52)  A1C with Estimated Average Glucose Result: 5.6 % (06-15-21 @ 02:42)    Finger Sticks:  POCT Blood Glucose.: 103 mg/dL ( @ 11:11)  POCT Blood Glucose.: 133 mg/dL ( @ 06:02)  POCT Blood Glucose.: 125 mg/dL ( @ 03:13)  POCT Blood Glucose.: 88 mg/dL ( @ 00:49)  POCT Blood Glucose.: 118 mg/dL ( @ 17:50)    Skin per nursing documentation: no pressure injuries   Edema: none at this time     Estimated Nutrition Needs:   Using dosing weight 78.5 kg  Energy Needs (25-30 kcal/kg): 2798-3131 kcal/day  Protein Needs (1.2-1.4 g/kg):     Previous Nutrition Diagnosis: Severe chronic malnutrition  Nutrition diagnosis is ongoing & addressed with tube feeds as tolerated    No new nutrition diagnosis at this time    Recommendations:    1. Recommend continue current EN regimen via post-pyloric tube, increasing feeds as tolerated to Vital 1.5 goal rate of 65ml/hr x 24hrs. At goal to provide 1560ml formula, 2340kcals, 105g protein, 1192ml free H2O (meets ~30kcals/kg & 1.33g protein/kg based on dosing wt 78.5kg).  2. Continue micronutrient supplementation as ordered  3. RD to remain available to adjust EN formulary, volume/rate PRN.     Monitoring and Evaluation:   Continue to monitor Nutritional intake, Tolerance to diet prescription, weights, labs, skin integrity  RD remains available upon request and will follow up per protocol     Wendy Travis, MS, RD, CDN, [facility]  pager #[phone]

## 2021-09-09 NOTE — PROGRESS NOTE ADULT - SUBJECTIVE AND OBJECTIVE BOX
Admitted for: Anemia    Following for: Amiodarone/contrast induced thyrotoxicosis     Subjective: Patient endorsing abdominal pain today, this has been persistent for many days now. He is no longer having vomiting episodes    MEDICATIONS  (STANDING):  dextrose 40% Gel 15 Gram(s) Oral once  dextrose 50% Injectable 25 Gram(s) IV Push once  dextrose 50% Injectable 12.5 Gram(s) IV Push once  glucagon  Injectable 1 milliGRAM(s) IntraMuscular once  heparin   Injectable 5000 Unit(s) SubCutaneous every 8 hours  hydrocortisone sodium succinate Injectable 50 milliGRAM(s) IV Push two times a day  insulin lispro (ADMELOG) corrective regimen sliding scale   SubCutaneous every 6 hours  insulin NPH human recombinant 6 Unit(s) SubCutaneous <User Schedule>  lidocaine   4% Patch 1 Patch Transdermal daily  methimazole 20 milliGRAM(s) Oral <User Schedule>  metoclopramide Injectable 10 milliGRAM(s) IV Push every 8 hours  mirtazapine 7.5 milliGRAM(s) Oral daily  multivitamin 1 Tablet(s) Oral daily  pantoprazole  Injectable 40 milliGRAM(s) IV Push every 12 hours  propranolol 40 milliGRAM(s) Oral every 8 hours  sertraline 100 milliGRAM(s) Oral daily  sodium chloride 0.9%. 1000 milliLiter(s) (10 mL/Hr) IV Continuous <Continuous>  thiamine 100 milliGRAM(s) Oral daily    MEDICATIONS  (PRN):  ondansetron Injectable 4 milliGRAM(s) IV Push every 6 hours PRN Nausea and/or Vomiting  simethicone 80 milliGRAM(s) Chew every 8 hours PRN Gas      Allergies    Amiodarone Hydrochloride (Other (Severe))    Intolerances    PHYSICAL EXAM:  VITALS: T(C): 36 (09-09-21 @ 12:00)  T(F): 96.8 (09-09-21 @ 12:00), Max: 98.2 (09-09-21 @ 00:00)  HR: 89 (09-09-21 @ 14:00) (73 - 110)  BP: 88/62 (09-09-21 @ 00:00) (82/59 - 96/72)  RR:  (8 - 48)  SpO2:  (95% - 100%)  Wt(kg): --  GENERAL: NAD, NGT in place  RESPIRATORY: Clear to auscultation bilaterally  CARDIOVASCULAR: Regular rate and rhythm; No murmurs; no peripheral edema  GI: Soft, tender to palpation   PSYCH: Alert and oriented x 3, flat affect        A1C with Estimated Average Glucose Result: 5.4 % (08-15-21 @ 13:52)  A1C with Estimated Average Glucose Result: 5.6 % (06-15-21 @ 02:42)      POCT Blood Glucose.: 103 mg/dL (09-09-21 @ 11:11)  POCT Blood Glucose.: 133 mg/dL (09-09-21 @ 06:02)  POCT Blood Glucose.: 125 mg/dL (09-09-21 @ 03:13)  POCT Blood Glucose.: 88 mg/dL (09-09-21 @ 00:49)  POCT Blood Glucose.: 118 mg/dL (09-08-21 @ 17:50)  POCT Blood Glucose.: 108 mg/dL (09-08-21 @ 11:12)  POCT Blood Glucose.: 119 mg/dL (09-08-21 @ 05:28)  POCT Blood Glucose.: 150 mg/dL (09-08-21 @ 00:02)  POCT Blood Glucose.: 112 mg/dL (09-07-21 @ 17:29)  POCT Blood Glucose.: 109 mg/dL (09-07-21 @ 12:59)  POCT Blood Glucose.: 113 mg/dL (09-07-21 @ 06:16)  POCT Blood Glucose.: 123 mg/dL (09-07-21 @ 00:04)  POCT Blood Glucose.: 148 mg/dL (09-06-21 @ 17:19)      09-09    136  |  100  |  14  ----------------------------<  135<H>  3.8   |  27  |  0.46<L>    EGFR if : 136  EGFR if non : 118    Ca    9.4      09-09  Mg     1.7     09-09  Phos  2.9     09-09    TPro  7.2  /  Alb  2.9<L>  /  TBili  0.4  /  DBili  x   /  AST  63<H>  /  ALT  194<H>  /  AlkPhos  255<H>  09-09      Thyroid Function Tests:  09-09 @ 10:34 TSH -- FreeT4 5.0 T3 110 Anti TPO -- Anti Thyroglobulin Ab -- TSI --  09-07 @ 05:43 TSH <0.01 FreeT4 5.3 T3 126 Anti TPO -- Anti Thyroglobulin Ab -- TSI --

## 2021-09-09 NOTE — PROGRESS NOTE ADULT - ASSESSMENT
Assessment and Recommendation:   · Assessment	  Assessment and recommendation :  Recurrent Acute hypoxic respiratory Failure S/P tracheostomy on trach. collar  50% FI02,   Acute blood loss anemia S/P multiple  blood transfusion   going for mesenteric angiogram  S/P septic shock off vasopressin , levophed and off  Dobutamine   S/P cholecystostomy tube placement by IR    AF RVR back to regular sinus Rhythm   Non ischemic cardiomyopathy continue ACE inhibitor and B-Blockers   S/P Septic shock and cardiogenic shock   Stage D systolic heart failure S/P LVAD HM2   MH2 LVAD  with  TV Annuloplasty  Severe peripheral vascular disease   severe hyperglycemia on insulin coverage    Reglan 10 mg for Gastric Motility   hyperthyroidism on Methimazole 10mg TID   critical care polyneuropathy   Anemia of Acute blood Loss   severe protein caloric malnutrition   S/P blood and FFP transfusion   Chronic kidney disease stage III  Depressed and withdrawn   resume  NGT feeding on Vital  advanced to 55 cc/ hr   Decannulation ?  GI prophylaxis with PPI

## 2021-09-09 NOTE — PROGRESS NOTE ADULT - ATTENDING COMMENTS
Worsening abdominal pain this am. No nausea/vomiting. Abdomen remains soft. LFTs are trending down.   WBC slightly trending up. No fever/chills.   Will stop uptitration of feedings rate.   PLan for angiogram tomorrow by vascular surgery. Discussed with endocrine team who agreed benefits may outweigh risk. Should try to minimize amount of contrast and monitor TFTs closely.

## 2021-09-09 NOTE — PROGRESS NOTE ADULT - SUBJECTIVE AND OBJECTIVE BOX
Vascular Surgery Pre-Op Checklist        Diagnosis:   Procedure:   Surgeon:        Labs:                          7.8    13.65 )-----------( 208      ( 09 Sep 2021 08:34 )             25.5   09-09    136  |  100  |  14  ----------------------------<  135<H>  3.8   |  27  |  0.46<L>    Ca    9.4      09 Sep 2021 08:34  Phos  2.9     09-09  Mg     1.7     09-09    TPro  7.2  /  Alb  2.9<L>  /  TBili  0.4  /  DBili  x   /  AST  63<H>  /  ALT  194<H>  /  AlkPhos  255<H>  09-09      MEDICATIONS  (STANDING):  dextrose 40% Gel 15 Gram(s) Oral once  dextrose 50% Injectable 25 Gram(s) IV Push once  dextrose 50% Injectable 12.5 Gram(s) IV Push once  glucagon  Injectable 1 milliGRAM(s) IntraMuscular once  heparin   Injectable 5000 Unit(s) SubCutaneous every 8 hours  hydrocortisone sodium succinate Injectable 50 milliGRAM(s) IV Push two times a day  insulin lispro (ADMELOG) corrective regimen sliding scale   SubCutaneous every 6 hours  insulin NPH human recombinant 6 Unit(s) SubCutaneous <User Schedule>  lidocaine   4% Patch 1 Patch Transdermal daily  methimazole 20 milliGRAM(s) Oral <User Schedule>  metoclopramide Injectable 10 milliGRAM(s) IV Push every 8 hours  mirtazapine 7.5 milliGRAM(s) Oral daily  multivitamin 1 Tablet(s) Oral daily  pantoprazole  Injectable 40 milliGRAM(s) IV Push every 12 hours  propranolol 40 milliGRAM(s) Oral every 8 hours  sertraline 100 milliGRAM(s) Oral daily  sodium chloride 0.9%. 1000 milliLiter(s) (10 mL/Hr) IV Continuous <Continuous>  thiamine 100 milliGRAM(s) Oral daily    MEDICATIONS  (PRN):  ondansetron Injectable 4 milliGRAM(s) IV Push every 6 hours PRN Nausea and/or Vomiting  simethicone 80 milliGRAM(s) Chew every 8 hours PRN Gas      Pre-Op Checklist  [X] NPO after midnight  [X] IVF  [X] AM CBC -- ordered  [X] AM BMP -- ordered  [X] AM PT, PTT, INR -- ordered  [ ] COVID -- received by lab  [X] Documentation of endo optimization -- endo note   [X] Documentation of cardiac optimization -- heart failure team note  [  ] Documentation of pulmonary optimization -- pending, pulm team Dr. Marshall has been notified  [  ] Documentation if Plavix acceptable (if stent needed) by GI given recent GI bleeds -- pending, GI team has been notified by CT ICU  [X] Consent

## 2021-09-09 NOTE — PROGRESS NOTE ADULT - ASSESSMENT
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 coli UTIs, driveline drainage (1/7/21) and COVID-19, here initially for GIB prolonged hospital course, s/p tracheostomy, acalculous cholecystitis s/p perc dawn. Patient has persistent intermittent abdominal pain. Per CTU team, pt has recently been refusing tube feeds and is being evaluated for chronic mesenteric ischemia vs SMA syndrome.  8/13 bld cxs positive. Consulted for TPN. Prealb 11, prior a1c 5.6, tg 141. 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 Tolerating feeds but with intermittent abd pain. SLP eval recommending NPO status.    -continue tube feeds- Vital 1.5 and advance to goal as tolerated (goal of 65 ml/hr) as per CTU team, no plans to initiate TPN at this time  -suspect hyperthyroidism contributing in part to metabolic issues ie. cAMP effects GI motility, hypercalcemia, tachycardia; with some clinical improvement, further management as per endo  -hypomagnesemia- rec replete as per primary  -h/o abdominal pain/vomiting- as above, Zofran prn for nausea; Reglan for gut motility; vascular input noted, planning for mesenteric angiogram, pending endo input  -anemia- rec iron supp when feasible  -management as per CTU; will remain available as needed    plan discussed with Dr. Soliz and Dr ANNE Tang, agreeable with above    TPN pager [phone], spectra [phone]  M-F 6A-4P, Weekends and holidays 8A-12P

## 2021-09-09 NOTE — PROGRESS NOTE ADULT - ATTENDING COMMENTS
Agree with assessment and plan as above by Dr. Aceves. Reviewed all pertinent labs, glucose values, and imaging studies. Modifications made as indicated above. Free T4 slowly improving now on methimazole. Cholestyramine discontinued. Will continue to trend Free T4. Possible plan for angio of abdomen and pelvis. Can be done if needed. Contrast may precipitate exacerbation of hyperthyroidism, but can weight risk benefits of the test. Repeat Free T4 9/13. Will adjust methimazole for further. Steroids to be tapered off. Will need to adjust NPH dosing once off steroids.     Billy Tipton D.O  [phone]

## 2021-09-09 NOTE — PROGRESS NOTE ADULT - SUBJECTIVE AND OBJECTIVE BOX
[facility] NUTRITION SUPPORT-- FOLLOW UP NOTE  -------------------------------------------------------------------------------    24 hour events/subjective: pt seen and examined. c/o abd pain. denies n/v. no  bm. tolerating tf at 55cc/hr. US noted    Diet:  Diet, NPO with Tube Feed:   Tube Feeding Modality: Nasogastric  Vital 1.5 Tank (VITAL1.5RTH)  Total Volume for 24 Hours (mL): 1560  Continuous  Starting Tube Feed Rate mL per Hour: 10  Increase Tube Feed Rate by (mL): 5     Every 24 hours  Until Goal Tube Feed Rate (mL per Hour): 65  Tube Feed Duration (in Hours): 24  Tube Feed Start Time: 11:45  Supplement Feeding Modality:  Nasogastric  Probiotic Yogurt/Smoothie Cans or Servings Per Day:  2       Frequency:  Daily (21 @ 00:07)      PAST HISTORY  --------------------------------------------------------------------------------  No significant changes to PMH, PSH, FHx, SHx, unless otherwise noted    ALLERGIES & MEDICATIONS  --------------------------------------------------------------------------------  Allergies    Amiodarone Hydrochloride (Other (Severe))    Intolerances      Standing Inpatient Medications  dextrose 40% Gel 15 Gram(s) Oral once  dextrose 50% Injectable 25 Gram(s) IV Push once  dextrose 50% Injectable 12.5 Gram(s) IV Push once  glucagon  Injectable 1 milliGRAM(s) IntraMuscular once  heparin   Injectable 5000 Unit(s) SubCutaneous every 8 hours  hydrocortisone sodium succinate Injectable 50 milliGRAM(s) IV Push two times a day  insulin lispro (ADMELOG) corrective regimen sliding scale   SubCutaneous every 6 hours  insulin NPH human recombinant 6 Unit(s) SubCutaneous <User Schedule>  lidocaine   4% Patch 1 Patch Transdermal daily  methimazole 20 milliGRAM(s) Oral <User Schedule>  metoclopramide Injectable 10 milliGRAM(s) IV Push every 8 hours  mirtazapine 7.5 milliGRAM(s) Oral daily  multivitamin 1 Tablet(s) Oral daily  pantoprazole  Injectable 40 milliGRAM(s) IV Push every 12 hours  propranolol 40 milliGRAM(s) Oral every 8 hours  sertraline 100 milliGRAM(s) Oral daily  sodium chloride 0.9%. 1000 milliLiter(s) IV Continuous <Continuous>  thiamine 100 milliGRAM(s) Oral daily    PRN Inpatient Medications  ondansetron Injectable 4 milliGRAM(s) IV Push every 6 hours PRN  simethicone 80 milliGRAM(s) Chew every 8 hours PRN        REVIEW OF SYSTEMS  --------------------------------------------------------------------------------    General: see hpi  HEENT:  no headaches, no vision changes, no ear pain, no epistaxis   CV:  no chest pain, no palpitations  Resp:  no dyspnea  GI:  see hpi  :  no pain, no bleeding, no dysuria  Muscle:  no pain, no weakness  Neuro:  no numbness, no tingling, no memory problems  Psych:  no insomnia  Heme: no ecchymosis, no easy bruising  Skin:  no rash, no edema        VITALS/PHYSICAL EXAM  --------------------------------------------------------------------------------  T(C): 36.4 (21 @ 08:00), Max: 36.8 (21 @ 00:00)  HR: 82 (21 @ 09:00) (75 - 110)  BP: 88/62 (21 @ 00:00) (81/60 - 96/72)  RR: 12 (21 @ 09:00) (8 - 48)  SpO2: 100% (21 @ 09:00) (95% - 100%)  Wt(kg): --      21 @ 07:01  -  21 @ 07:00  --------------------------------------------------------  IN: 1335 mL / OUT: 1525 mL / NET: -190 mL    21 @ 07:01  -  21 @ 09:33  --------------------------------------------------------  IN: 140 mL / OUT: 0 mL / NET: 140 mL      I&O's Detail    08 Sep 2021 07:01  -  09 Sep 2021 07:00  --------------------------------------------------------  IN:    Miscellaneous Tube Feedin mL    sodium chloride 0.9%: 30 mL  Total IN: 1335 mL    OUT:    Drain (mL): 500 mL    Voided (mL): 1025 mL  Total OUT: 1525 mL    Total NET: -190 mL      09 Sep 2021 07:01  -  09 Sep 2021 09:33  --------------------------------------------------------  IN:    Enteral Tube Flush: 30 mL    Miscellaneous Tube Feedin mL  Total IN: 140 mL    OUT:  Total OUT: 0 mL    Total NET: 140 mL          Physical Exam:  Gen: lying in bed in nad  HEENT: ncat, trachea midline +ngt  Chest: respirations non labored  Abd: soft non distended  LE: no edema  Neuro: awake alert appropriate      LABS/STUDIES  --------------------------------------------------------------------------------              7.8    13.65 >-----------<  208      [21 @ 08:34]              25.5     136  |  100  |  14  ----------------------------<  135      [21 @ 08:34]  3.8   |  27  |  0.46        Ca     9.4     [21 @ 08:34]      Mg     1.7     [21 @ 08:34]      Phos  2.9     [21 @ 08:34]    TPro  7.2  /  Alb  2.9  /  TBili  0.4  /  DBili  x   /  AST  63  /  ALT  194  /  AlkPhos  255  [21 @ 08:34]              [21 @ 08:34]    Ca ionizedBlood Gas Arterial - Calcium, Ionized: 1.28 mmol/L (21 @ 08:20)  Blood Gas Arterial - Calcium, Ionized: 1.33 mmol/L (21 @ 00:14)    Creatinine Trend:  POC glucoseGlucose, Serum: 135 mg/dL (21 @ 08:34)  CAPILLARY BLOOD GLUCOSE      POCT Blood Glucose.: 133 mg/dL (09 Sep 2021 06:02)  POCT Blood Glucose.: 125 mg/dL (09 Sep 2021 03:13)  POCT Blood Glucose.: 88 mg/dL (09 Sep 2021 00:49)  POCT Blood Glucose.: 118 mg/dL (08 Sep 2021 17:50)  POCT Blood Glucose.: 108 mg/dL (08 Sep 2021 11:12)    PrealbuminPrealbumin, Serum: 11 mg/dL (21 @ 04:01)  Prealbumin, Serum: 10 mg/dL (08-15-21 @ 13:53)    Triglycerides

## 2021-09-09 NOTE — PROGRESS NOTE ADULT - PROBLEM SELECTOR PLAN 4
- serratia bacteremia and poss acalculous cholecystitis. B/c with gram + cocci and many enterobacter Carbapenem resistant strain and now s/p per dawn drain  - also with enterobacter from sputum culture 8/13  - off antibitoics - serratia bacteremia and poss acalculous cholecystitis. B/c with gram + cocci and many enterobacter Carbapenem resistant strain and now s/p per dawn drain  - also with enterobacter from sputum culture 8/13  - off antibiotics  - WBC trending up, will monitor

## 2021-09-09 NOTE — PROGRESS NOTE ADULT - SUBJECTIVE AND OBJECTIVE BOX
Subjective: Patient seen and examined resting in bed. ON no issues. States that his abdomen is hurting more than yesterday    Medications:  dextrose 40% Gel 15 Gram(s) Oral once  dextrose 50% Injectable 25 Gram(s) IV Push once  dextrose 50% Injectable 12.5 Gram(s) IV Push once  glucagon  Injectable 1 milliGRAM(s) IntraMuscular once  heparin   Injectable 5000 Unit(s) SubCutaneous every 8 hours  hydrocortisone sodium succinate Injectable 50 milliGRAM(s) IV Push two times a day  insulin lispro (ADMELOG) corrective regimen sliding scale   SubCutaneous every 6 hours  insulin NPH human recombinant 6 Unit(s) SubCutaneous <User Schedule>  lidocaine   4% Patch 1 Patch Transdermal daily  methimazole 20 milliGRAM(s) Oral <User Schedule>  metoclopramide Injectable 10 milliGRAM(s) IV Push every 8 hours  mirtazapine 7.5 milliGRAM(s) Oral daily  multivitamin 1 Tablet(s) Oral daily  ondansetron Injectable 4 milliGRAM(s) IV Push every 6 hours PRN  pantoprazole  Injectable 40 milliGRAM(s) IV Push every 12 hours  propranolol 40 milliGRAM(s) Oral every 8 hours  sertraline 100 milliGRAM(s) Oral daily  simethicone 80 milliGRAM(s) Chew every 8 hours PRN  sodium chloride 0.9%. 1000 milliLiter(s) IV Continuous <Continuous>  thiamine 100 milliGRAM(s) Oral daily    ICU Vital Signs Last 24 Hrs  T(C): 36 (09 Sep 2021 12:00), Max: 36.8 (09 Sep 2021 00:00)  T(F): 96.8 (09 Sep 2021 12:00), Max: 98.2 (09 Sep 2021 00:00)  HR: 85 (09 Sep 2021 15:00) (73 - 110)  BP: 88/62 (09 Sep 2021 00:00) (82/59 - 96/72)  BP(mean): 70 (09 Sep 2021 00:00) (67 - 79)  ABP: 84/70 (09 Sep 2021 15:00) (72/54 - 103/76)  ABP(mean): 77 (09 Sep 2021 15:00) (60 - 87)  RR: 29 (09 Sep 2021 15:00) (8 - 48)  SpO2: 100% (09 Sep 2021 15:00) (98% - 100%)      Weight in k.5 ( @ 00:00)    I&O's Summary    08 Sep 2021 07:01  -  09 Sep 2021 07:00  --------------------------------------------------------  IN: 1335 mL / OUT: 1525 mL / NET: -190 mL    09 Sep 2021 07:01  -  09 Sep 2021 15:08  --------------------------------------------------------  IN: 560 mL / OUT: 470 mL / NET: 90 mL        Physical Exam  General: No distress. Comfortable.  HEENT: EOM intact.  Neck: JVP not elevated  Chest: +trach   CV: +VAD hum  Abdomen: Soft, RUQ tenderness by perc dawn site and driveline exit site. Dawn drain with bilious fluid. Positive BS throughout.  Neurology: Alert and oriented times three.     LVAD Interrogation  VAD TYPE HM 2  Speed 9200  Flow 5.5  Power 5.6  PI 5.9  Assessment of driveline exit site: driveline dressing c/d/i  Programming changes: no changes made    Labs:                        7.8    13.65 )-----------( 208      ( 09 Sep 2021 08:34 )             25.5         136  |  100  |  14  ----------------------------<  135<H>  3.8   |  27  |  0.46<L>    Ca    9.4      09 Sep 2021 08:34  Phos  2.9       Mg     1.7         TPro  7.2  /  Alb  2.9<L>  /  TBili  0.4  /  DBili  x   /  AST  63<H>  /  ALT  194<H>  /  AlkPhos  255<H>                Lactate Dehydrogenase, Serum: 289 U/L ( @ 08:34)  Lactate Dehydrogenase, Serum: 197 U/L ( @ 00:32)  Lactate Dehydrogenase, Serum: 189 U/L ( @ 00:18)

## 2021-09-09 NOTE — PROGRESS NOTE ADULT - SUBJECTIVE AND OBJECTIVE BOX
RICKY JOINT  MRN#: [id number]  Subjective:  Pulmonary progress  : recurrent Acute hypoxic respiratory Failure ,aspiration pneumonia, NICM  , chart reviewed and H/O obtained radiological and Laboratory study reviewed patient Examined     65M PMH ACC/AHA stage D HF due to NICM HM2 LVAD , TV annuloplasty ring 17 as destination therapy due to severe peripheral artery disease with significant stenosis  SIADH, Depression, CKD-3 with hyperkalemia, past E. coli UTIs, driveline drainage (21) and COVID-19 (back in 2020)  He was recently seen in clinic where he complained of abdominal pain and dark stools w constipation back in May. He presents to [facility] ER today weakness and fatigue, moderate and + Black stools for three days, on coumadin secondary to warfarin use in the setting of an LVAD. Patient has required transfusions for GIB in the past. Mostly recently back in 2021 pt had anemia with dark stools. No interventions was done at that time. However Last Endoscopy was done in 2020 (negative). Today labs show patient is anemic with H/H of 4.5/16.3,. INR is 8.84 MAP in the 90s, Temp 35.1. He denies any chest pain, shortness of breath, dizziness, abd pain, nausea or vomiting. found to have  rectal bleeding underwent endoscopy ,old blood in the proximal ileum ,  develop sepsis with LL opacity given Antibiotics , Extubated , reintubated , Bronchoscopy on Zosyn for LL pneumonia  and Amiodarone S/P TV Annuloplasty , patient remain intubated on full ventilatory support .S/P multiple units of blood transfusion , remain on full ventilatory support on Precedex and propofol , new central IJ line , diarrhea C diff. +ve on po vancomycin and IV Flagyl,  mildly distended belly , fever start on cefepime 2gm q 8 hrs S/P tracheostomy .  new RT Subclavian central line continue on contact  isolation ,still diarrhea on C-diff antibiotics ENT follow up appreciated , trial of C-PAP as tolerated , , copious secretion from trach. site chest x ray left lower lobe pneumonia , tolerating trch. collar 50% FI02 still excessive secretion need pulmonary toilet , off Ancef antibiotic , no more diarrhea back on full support mechanical ventilator , chest x ray show improvement in LLL air space disease, more awake and responsive on tube feeding no more diarrhea , S/P  Ancef for bacteremia no fever ,ID follow up noted ,  no nausea or vomiting or diarrhea still very weak and tired , event note pulled NG tube now replaced , back on tube feeding ,still on po vancomycin , getting PT and OT at bed side , no plan for decannulation for now. , no more diarrhea receiving PT and OPT at bed side , minimal secretion from tracheostomy site , no SOB getting stronger , improve muscle tone patient transfer to monitor bed still on contact isolation for C-Difficel colitis on 50% FI02, NG tube clogged , and change to resume tube feeding still loose stool . H/H drop significantly require blood transfusion , most likely GI bleeding , IV heparin D/C , vomiting 200 cc of creamy color tube feeding on hold no sob, still melena monitor in the CTU possible capsule endoscopy , H/H is stable ., patient develop TR sided  pneumothorax require chest tube placement , RT IJ central line  placed , develop fever shaking chills , blood culture positive for serratia marcescens , start on cefepime .the patient  become hypoxic and hypotensive placed on full ventilatory support and Vasopressin , levophed and Dobutamine ,S/P blood transfusion on meropenem and vancomycin ,   , on and  off pressors , occasional agitation on Precedex .S/P IR cholecystostomy tube drainage placement in the RT upper Quadrant , resume anticoagulation chest x ray noted C-PAP trail lasted only for 2 hrs , new RT SC line and D/C RT IJ line , RT pig tail cathter has been removed , tolerating C-PAP trial placed on trach. collar 50% FI02 GI consultant noted on NG tube feeding as tolerated , develop AF RVR S/P  bolus Amiodarone  back to regular sinus Rhythm , Flat Affect depressed , back on tube feeding Vital AF at 60 cc/ hr .still intermittent abdominal pain , no fever saturation is accepted  back on full ventilatory support , tired and sleepy on round  .start  back on  tube feeding . tolerating it very well , no nausea or vomiting , good 02 saturation on trac-collar on methimazole for hyperthyroidism , no new C/O no plan for decannulation yet , electrolyte blood test is still pending .on respiratory isolation sputum culture is negative , increase WBC  improved on cefepime , sputum positive for enterococcus , condition is the same ,still C/O Abdominal pain , white count is improving , no chest pain or SOB ,  .placed on Reglan 10 mg TID for gastric motility , depressed , withdrawn. on full NG tube diet with Vital , secretion are much improved , going for mesenteric angiogram       (2021 16:57)    PAST MEDICAL & SURGICAL HISTORY:  CHF (congestive heart failure)    CAD (coronary artery disease)  Depression    Pleural effusion    History of 2019 novel coronavirus disease (COVID-19)  2020    Hemorrhoids    Bleeding hemorrhoids    Peripheral arterial disease    Claudication    BPH with urinary obstruction    ACC/AHA stage D systolic heart failure  Anticoagulation goal of INR 2.0 to 2.5    Falls    Clavicle fracture    CKD (chronic kidney disease), stage III    Iron deficiency anemia    H/O epistaxis    Vertigo    GI bleed    S/P TVR (tricuspid valve repair)    S/P ventricular assist device    S/P endoscopy    OBJECTIVE:  ICU Vital Signs Last 24 Hrs  T(C): 38.2 (2021 10:00), Max: 38.5 (2021 12:00)  T(F): 100.8 (2021 10:00), Max: 101.3 (2021 12:00)  HR: 65 (2021 10:00) (61 - 69)  BP: --  BP(mean): --  ABP: 105/67 (2021 10:00) (90/54 - 113/64)  ABP(mean): 77 (2021 10:00) (63 - 77)  RR: 20 (2021 10:00) (19 - 35)  SpO2: 99% (2021 10:00) (96% - 100%)       @ 07: @ 07:00  --------------------------------------------------------  IN: 2693.9 mL / OUT: 1415 mL / NET: 1278.9 mL     @ 07:01  -   @ 10:49  --------------------------------------------------------  IN: 420.8 mL / OUT: 115 mL / NET: 305.8 mL  PHYSICAL EXAM:Daily   Elderly male S/P tracheostomy on trach. collar 50% FI02 ,  Daily Weight in k.4 (2021 00:00)  HEENT:     + NCAT  + EOMI  - Conjuctival edema   - Icterus   - Thrush   - ETT  + NGT/OGT  Neck:         + FROM  RT IJ  line JVD  - Nodes - Masses + Mid-line trachea + Tracheostomy  Chest:            normal A-P diameter    Lungs:          + CTA   + Rhonchi    - Rales    - Wheezing + Decreased  LT BS   - Dullness R L  Cardiac:       + S1 + S2    + RRR   - Irregular   - S3  - S4    - Murmurs   - Rub   - Hamman’s sign   Abdomen:    + BS  + Soft + Non-tender - Distended - Organomegaly - PEG .cholecystostomy tube in place  Extremities:   - Cyanosis U/L   - Clubbing  U/L  + LE/UE Edema   + Capillary refill    + Pulses   Neuro:        - Awake   -  Alert   - Confused   - Lethargic   + Sedated  + Generalized Weakness  Skin:        - Rashes    - Erythema   + Normal incisions   + IV sites intact          HOSPITAL MEDICATIONS: All medications reviewed and analyzed  MEDICATIONS  (STANDING):  amiodarone    Tablet 200 milliGRAM(s) Oral daily  chlorhexidine 0.12% Liquid 15 milliLiter(s) Oral Mucosa every 12 hours  chlorhexidine 2% Cloths 1 Application(s) Topical <User Schedule>  dexmedetomidine Infusion 0.5 MICROgram(s)/kG/Hr (9.81 mL/Hr) IV Continuous <Continuous>  dextrose 50% Injectable 50 milliLiter(s) IV Push every 15 minutes  heparin  Infusion 400 Unit(s)/Hr (12.5 mL/Hr) IV Continuous <Continuous>  Hydromorphone  Injectable 0.5 milliGRAM(s) IV Push once  insulin lispro (ADMELOG) corrective regimen sliding scale   SubCutaneous every 6 hours  pantoprazole  Injectable 40 milliGRAM(s) IV Push every 12 hours  piperacillin/tazobactam IVPB.. 3.375 Gram(s) IV Intermittent every 8 hours  propofol Infusion 20 MICROgram(s)/kG/Min (9.42 mL/Hr) IV Continuous <Continuous>  sodium chloride 0.9% lock flush 3 milliLiter(s) IV Push every 8 hours  sodium chloride 0.9%. 1000 milliLiter(s) (10 mL/Hr) IV Continuous <Continuous>    MEDICATIONS  (PRN):  acetaminophen    Suspension .. 650 milliGRAM(s) Oral every 6 hours PRN Temp greater or equal to 38C (100.4F)    LABS: All Lab data reviewed and analyzed                         7.8    13.65 )-----------( 208      ( 09 Sep 2021 08:34 )             25.5       136  |  100  |  14  ----------------------------<  135<H>  3.8   |  27  |  0.46<L>    Ca    9.4      09 Sep 2021 08:34  Phos  2.9     -  Mg     1.7         TPro  7.2  /  Alb  2.9<L>  /  TBili  0.4  /  DBili  x   /  AST  63<H>  /  ALT  194<H>  /  AlkPhos  255<H>      Ca    9.6      08 Sep 2021 00:32  Phos  2.9     -  Mg     1.7         TPro  7.0  /  Alb  3.0<L>  /  TBili  0.4  /  DBili  x   /  AST  97<H>  /  ALT  230<H>  /  AlkPhos  260<H>                                                                                                                            PTT - ( 2021 04:52 )  PTT:45.2 sec LIVER FUNCTIONS - ( 2021 00:42 )  Alb: 3.4 g/dL / Pro: 6.7 g/dL / ALK PHOS: 213 U/L / ALT: 15 U/L / AST: 24 U/L / GGT: x           RADIOLOGY: - Reviewed and analyzed RT Pig tail cathter  , LVAD HM2, CT scan of abdomen reviewed result noted

## 2021-09-09 NOTE — PROGRESS NOTE ADULT - SUBJECTIVE AND OBJECTIVE BOX
RICKY HAMPTON  MRN-[id number]  Patient is a 66y old  Male who presents with a chief complaint of Anemia, Supratherapeutic INR, Dark Stools (09 Sep 2021 09:33)    HPI:  65M PMH ACC/AHA stage D HF due to NICM HM2 LVAD , TV annuloplasty ring 17 as destination therapy due to severe peripheral artery disease with significant stenosis  SIADH, Depression, CKD-3 with hyperkalemia, past E. coli UTIs, driveline drainage (21) and COVID-19 (back in 2020)  He was recently seen in clinic where he complained of abdominal pain and dark stools w constipation back in May. He presents to [facility] ER today weakness and fatigue, moderate and + Black stools for three days, on coumadin secondary to warfarin use in the setting of an LVAD. Patient has required transfusions for GIB in the past. Mostly recently back in 2021 pt had anemia with dark stools. No interventions was done at that time. However Last Endoscopy was done in 2020 (negative). Today labs show patient is anemic with H/H of 4.5/16.3,. INR is 8.84 MAP in the 90s, Temp 35.1. He denies any chest pain, shortness of breath, dizziness, abd pain, nausea or vomiting.     (2021 16:57)      Surgery/Hospital Course:   admit for melena w/ anemia, INR 8.84   6/15 Capsul study (+) for small bowel bleed, balloon endoscopy (old blood in prox ileum); post EGD - septic w/ L opacity, re-intubated for concern for aspiration, TTE (Mod MR, decrease biV w/ interventricular septum boweing towards R)   bronch    +C Diff    CT C/A/P: Fluid filled colon which may be 2/2 rapid transit. Small bilateral pleffs with associates. Compressive atelectasis New ISABELLE & LLL  parenchymal opacities, suspicious for pneumonia. 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    #8 Shiley trach at bedside    LVAD speed increased to 9200   Bronch   TC since . Patient transferred to SDU.    INR today 2.64.  H&H 7.3/24 this AM.  Will repeat CBC at noon, and will send stool guaiac Patient with persistent abdominal tenderness, rate of tube feeds decreased.  No nausea/vomiting.     INR today 2.4. H&H 9.1/28.6 low flow overnight /N&V, refusing Tube feeds on D5 1/2 normal  @50 cc/hr   INR 2.69  H&H 7.7/.1 refusing Tube feeds on D5 1/2 normal  @50 cc/hr. This am + BM Melena Dr Oneill HF  aware- PRBC x1  GI team consulted -  NPO  plan on study in am-  D/w Dr Cadet Patient  to return to CTU for further management; 1PRBCS    Post op INR 2.2 today.  No bleeding. BC + for SM.  Pt is hypotensive requiring pressor and inotropic support.  ID follow up today on Cefepime will follow.   R PTC for PTX    CT C/A/P: sub q emphysema in R chest wall, GGO RUL, small ascites CTH negative; Abd US: GB thickening, pericholecystic fluid     Perchole drain in place continues to drain total output overnight 133.  Fever today 38.8    duplex LE negative    Patient with persistent abdominal pain, refusing tube feeds and medications, Psych consulted   CTA A/P ordered to r/o mesenteric ischemia 2/2 persistent anorexia, nausea, vomiting. Revealed:  Evaluation of the mesenteric vessels is limited by streak artifact from LVAD. There appears to be severe stenosis of the proximal SMA; abdominal mesenteric doppler is recommended for further evaluation. 2.  No small bowel findings to suggest acute mesenteric ischemia. 3.  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  8/15: Cultures resulted BC 1/2 +GPC in clusters, SC enterobacter; mesenteric duplex: borderline stenosis of proximal SMA  : CT C/A/P noncon: Nondular opacities in R lung apex w/cavitation, abd nl  :  Continue current care, treatment of thyrotoxicosis with medications as per endocrine, d/c ABX as per team.     Today:  Pending RUQ US results to reassess gallbladder with perc sybil tube. Repeat SCx yesterday +GPC in pairs, will f/u to get patient off isolation. Will repeat thyroid studies and f/u management with endocrine re: hyperthyroidism. Mesenteric angiogram tentatively scheduled for next week.     REVIEW OF SYSTEMS:  Patient speaks over trach   Gen: No fever, leg pain improved  EYES/ENT: No visual changes;  No vertigo or throat pain   NECK: No pain   RES:  No shortness of breath or Cough  CARD: No chest pain   GI: +abdominal pain, denies nausea at this time  : No dysuria  NEURO: No weakness; + lower extremity pain, improving  SKIN: No itching, rashes     ICU Vital Signs Last 24 Hrs  T(C): 36 (09 Sep 2021 12:00), Max: 36.8 (09 Sep 2021 00:00)  T(F): 96.8 (09 Sep 2021 12:00), Max: 98.2 (09 Sep 2021 00:00)  HR: 89 (09 Sep 2021 14:00) (73 - 110)  BP: 88/62 (09 Sep 2021 00:00) (82/59 - 96/72)  BP(mean): 70 (09 Sep 2021 00:00) (65 - 79)  ABP: 103/76 (09 Sep 2021 14:00) (72/54 - 103/76)  ABP(mean): 87 (09 Sep 2021 14:00) (60 - 87)  RR: 18 (09 Sep 2021 14:00) (8 - 48)  SpO2: 100% (09 Sep 2021 14:00) (95% - 100%)      Physical Exam:  Gen:  Awake and alert, Sitting in chair in NAD.  Psych: Mood greatly improved but waxes and wanes, more interactive and participating in care. Would like to be more included in bedside rounding.  CNS:  intact, nonfocal, responds to all commands  Neck: no JVD, +TC   RES : course breath sounds, no wheezing, moderate tan secretions able to cough most up on own         CVS: +LVAD hum   Abd: Soft, RUQ tenderness by perc sybil site. Sybil drain with bilious fluid. Positive BS throughout.  Skin: No rash  Ext:  no edema    ============================I/O===========================   I&O's Detail    08 Sep 2021 07:01  -  09 Sep 2021 07:00  --------------------------------------------------------  IN:    Miscellaneous Tube Feedin mL    sodium chloride 0.9%: 30 mL  Total IN: 1335 mL    OUT:    Drain (mL): 500 mL    Voided (mL): 1025 mL  Total OUT: 1525 mL    Total NET: -190 mL      09 Sep 2021 07:01  -  09 Sep 2021 14:19  --------------------------------------------------------  IN:    Enteral Tube Flush: 120 mL    Miscellaneous Tube Feedin mL  Total IN: 450 mL    OUT:    Drain (mL): 70 mL    Voided (mL): 400 mL  Total OUT: 470 mL    Total NET: -20 mL        ============================ LABS =========================                        7.8    13.65 )-----------( 208      ( 09 Sep 2021 08:34 )             25.5         136  |  100  |  14  ----------------------------<  135<H>  3.8   |  27  |  0.46<L>    Ca    9.4      09 Sep 2021 08:34  Phos  2.9       Mg     1.7         TPro  7.2  /  Alb  2.9<L>  /  TBili  0.4  /  DBili  x   /  AST  63<H>  /  ALT  194<H>  /  AlkPhos  255<H>      LIVER FUNCTIONS - ( 09 Sep 2021 08:34 )  Alb: 2.9 g/dL / Pro: 7.2 g/dL / ALK PHOS: 255 U/L / ALT: 194 U/L / AST: 63 U/L / GGT: x             ABG - ( 09 Sep 2021 08:20 )  pH, Arterial: 7.42  pH, Blood: x     /  pCO2: 51    /  pO2: 149   / HCO3: 33    / Base Excess: 7.6   /  SaO2: 99.8                ======================Micro/Rad/Cardio=================  Culture: Reviewed   CXR: Reviewed  Echo:Reviewed  ======================================================  PAST MEDICAL & SURGICAL HISTORY:  CHF (congestive heart failure)    CAD (coronary artery disease)    Depression    Pleural effusion    History of 2019 novel coronavirus disease (COVID-19)  2020    Hemorrhoids    Bleeding hemorrhoids    Peripheral arterial disease    Claudication    BPH with urinary obstruction    ACC/AHA stage D systolic heart failure    Anticoagulation goal of INR 2.0 to 2.5    Falls    Clavicle fracture    CKD (chronic kidney disease), stage III    Iron deficiency anemia    H/O epistaxis    Vertigo    GI bleed    S/P TVR (tricuspid valve repair)    S/P ventricular assist device    S/P endoscopy      ====================ASSESSMENT ==============  Stage D Nonischemic Cardiomyopathy, Status Post HM2 on 2017    Cardiogenic shock  Hemodynamic instability   Acute hypoxemic respiratory failure s/p trach   GI bleed , Status Post Enteroscopy   Anemia, in setting of melena   Chronic Kidney Disease  Stress hyperglycemia   C.diff positive on    Hypovolemic shock  Septic shock  Leukocytosis  GB thickening/percholecystic s/p perc choley by IT   SMA stenosis  Serratia/citrobacter pneumonia   Stenotrophomonas pneumonia   Enterobacter pneumonia   Nasuea/vomiting  Deconditioning      Plan:  ====================== NEUROLOGY=====================  Nonfocal, continue to monitor neuro status   C/w mirtazapine and sertraline for depression  Deconditioned, PT/Ambulate as tolerated    mirtazapine 7.5 milliGRAM(s) Oral daily  sertraline 100 milliGRAM(s) Oral daily    ==================== RESPIRATORY======================  Acute hypoxemic respiratory failure s/p #8 shiley trach on , continue TC as tolerated (TC since )  Continue close monitoring of respiratory rate and breathing pattern, following of ABG’s with A-line monitoring, continuous pulse oximetry monitoring.   Moderate secretions, continue suctioning prn, pulmonary toileting.     ====================CARDIOVASCULAR==================  Stage D Nonischemic Cardiomyopathy, Status Post HM2 on 2017; LVAD settings 9200, flow 4.7   TTE : reveals at least moderate MR, mild AI, severe global LV syst dysfxn, dec RV fxn   Propranolol for rate control of HR 2/2 Hyperthyroidism, titrate as tolerated per Endo  Continue invasive hemodynamic monitoring     propranolol 40 milliGRAM(s) Oral every 8 hours    ===================HEMATOLOGIC/ONC ===================  Acute blood loss anemia, monitor H&H/Plts    Holding coumadin and ASA given recurrent GI bleeding. Continue to monitor LDH daily.    VTE prophylaxis, heparin   Injectable 5000 Unit(s) SubCutaneous every 8 hours    ===================== RENAL =========================  Continue monitoring urine output, I&OS, BUN/Cr   Euvolemic, even fluid balance, currently off diuretics.    Replete lytes PRN. Keep K> 4 and Mg >2    ==================== GASTROINTESTINAL===================  S/p cholecystostomy tube placed on  with IR   CTA A/P : Evaluation of the mesenteric vessels is limited by streak artifact from LVAD. There appears to be severe stenosis of the proximal SMA; abdominal mesenteric doppler is recommended for further evaluation. 2.  No small bowel findings to suggest acute mesenteric ischemia. 3.  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  Mesenteric duplex on 8/15 borderline stenosis of proximal SMA. Mesenteric angiogram tentatively scheduled for next week.   Pending RUQ US results to reassess gallbladder with perc sybil tube  Vital 1.5 sahntelle feeds - Continue increasing tube feeds 5cc/day for goal of 65cc/hr as tolerated  CT Abd/Pel on  without acute process   Reglan for gut motility  Zofran PRN nausea/vomiting     multivitamin 1 Tablet(s) Oral daily  GI prophylaxis, pantoprazole  Injectable 40 milliGRAM(s) IV Push every 12 hours  simethicone 80 milliGRAM(s) Chew every 8 hours PRN Gas  sodium chloride 0.9%. 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     dextrose 40% Gel 15 Gram(s) Oral once  dextrose 50% Injectable 25 Gram(s) IV Push once  dextrose 50% Injectable 12.5 Gram(s) IV Push once  glucagon  Injectable 1 milliGRAM(s) IntraMuscular once  hydrocortisone sodium succinate Injectable 50 milliGRAM(s) IV Push two times a day  insulin lispro (ADMELOG) corrective regimen sliding scale   SubCutaneous every 6 hours  insulin NPH human recombinant 6 Unit(s) SubCutaneous <User Schedule>  methimazole 20 milliGRAM(s) Oral <User Schedule>    ========================INFECTIOUS DISEASE================  Afebrile, WBC rising 12.93->13.65   Continue trending WBC and monitoring fever curve   Culture from sybil drain on : NGTD  SCx on  +rare gram positive cocci and gram variable rods  Repeat SCx yesterday +GPC in pairs, will f/u to get patient off isolation   ID following, MD Prater, appreciate recommendations      Patient requires continuous monitoring with bedside rhythm monitoring, pulse ox monitoring, and intermittent blood gas analysis. Care plan discussed with ICU care team. Patient remained critical and at risk for life threatening decompensation.     By signing my name below, I, Agnieszka Cherry, attest that this documentation has been prepared under the direction and in the presence of Jaclyn Mendoza NP   Electronically signed: Cesia Shaffer, 21 @ 14:19    I, Jaclyn Mendoza,, personally performed the services described in this documentation. all medical record entries made by the luisibe were at my direction and in my presence. I have reviewed the chart and agree that the record reflects my personal performance and is accurate and complete  Electronically signed: Jaclyn Mendoza NP        RICKY HAMPTON  MRN-[id number]  Patient is a 66y old  Male who presents with a chief complaint of Anemia, Supratherapeutic INR, Dark Stools (09 Sep 2021 09:33)    HPI:  65M PMH ACC/AHA stage D HF due to NICM HM2 LVAD , TV annuloplasty ring 17 as destination therapy due to severe peripheral artery disease with significant stenosis  SIADH, Depression, CKD-3 with hyperkalemia, past E. coli UTIs, driveline drainage (21) and COVID-19 (back in 2020)  He was recently seen in clinic where he complained of abdominal pain and dark stools w constipation back in May. He presents to [facility] ER today weakness and fatigue, moderate and + Black stools for three days, on coumadin secondary to warfarin use in the setting of an LVAD. Patient has required transfusions for GIB in the past. Mostly recently back in 2021 pt had anemia with dark stools. No interventions was done at that time. However Last Endoscopy was done in 2020 (negative). Today labs show patient is anemic with H/H of 4.5/16.3,. INR is 8.84 MAP in the 90s, Temp 35.1. He denies any chest pain, shortness of breath, dizziness, abd pain, nausea or vomiting.     (2021 16:57)      Surgery/Hospital Course:   admit for melena w/ anemia, INR 8.84   6/15 Capsul study (+) for small bowel bleed, balloon endoscopy (old blood in prox ileum); post EGD - septic w/ L opacity, re-intubated for concern for aspiration, TTE (Mod MR, decrease biV w/ interventricular septum boweing towards R)   bronch    +C Diff    CT C/A/P: Fluid filled colon which may be 2/2 rapid transit. Small bilateral pleffs with associates. Compressive atelectasis New ISABELLE & LLL  parenchymal opacities, suspicious for pneumonia. 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    #8 Shiley trach at bedside    LVAD speed increased to 9200   Bronch   TC since . Patient transferred to SDU.    INR today 2.64.  H&H 7.3/24 this AM.  Will repeat CBC at noon, and will send stool guaiac Patient with persistent abdominal tenderness, rate of tube feeds decreased.  No nausea/vomiting.     INR today 2.4. H&H 9.1/28.6 low flow overnight /N&V, refusing Tube feeds on D5 1/2 normal  @50 cc/hr   INR 2.69  H&H 7.7/.1 refusing Tube feeds on D5 1/2 normal  @50 cc/hr. This am + BM Melena Dr Oneill HF  aware- PRBC x1  GI team consulted -  NPO  plan on study in am-  D/w Dr Cadet Patient  to return to CTU for further management; 1PRBCS    Post op INR 2.2 today.  No bleeding. BC + for SM.  Pt is hypotensive requiring pressor and inotropic support.  ID follow up today on Cefepime will follow.   R PTC for PTX    CT C/A/P: sub q emphysema in R chest wall, GGO RUL, small ascites CTH negative; Abd US: GB thickening, pericholecystic fluid     Perchole drain in place continues to drain total output overnight 133.  Fever today 38.8    duplex LE negative    Patient with persistent abdominal pain, refusing tube feeds and medications, Psych consulted   CTA A/P ordered to r/o mesenteric ischemia 2/2 persistent anorexia, nausea, vomiting. Revealed:  Evaluation of the mesenteric vessels is limited by streak artifact from LVAD. There appears to be severe stenosis of the proximal SMA; abdominal mesenteric doppler is recommended for further evaluation. 2.  No small bowel findings to suggest acute mesenteric ischemia. 3.  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  8/15: Cultures resulted BC 1/2 +GPC in clusters, SC enterobacter; mesenteric duplex: borderline stenosis of proximal SMA  : CT C/A/P noncon: Nondular opacities in R lung apex w/cavitation, abd nl  :  Continue current care, treatment of thyrotoxicosis with medications as per endocrine, d/c ABX as per team.     Today:   Repeat SCx yesterday +GPC in pairs, will f/u to get patient off isolation. Will repeat thyroid studies and f/u management with endocrine re: hyperthyroidism. Mesenteric angiogram tentatively scheduled for next week?    REVIEW OF SYSTEMS:  Patient speaks over trach   Gen: No fever, leg pain improved  EYES/ENT: No visual changes;  No vertigo or throat pain   NECK: No pain   RES:  No shortness of breath or Cough  CARD: No chest pain   GI: +abdominal pain, denies nausea at this time  : No dysuria  NEURO: No weakness; + lower extremity pain, improving  SKIN: No itching, rashes     ICU Vital Signs Last 24 Hrs  T(C): 36 (09 Sep 2021 12:00), Max: 36.8 (09 Sep 2021 00:00)  T(F): 96.8 (09 Sep 2021 12:00), Max: 98.2 (09 Sep 2021 00:00)  HR: 89 (09 Sep 2021 14:00) (73 - 110)  BP: 88/62 (09 Sep 2021 00:00) (82/59 - 96/72)  BP(mean): 70 (09 Sep 2021 00:00) (65 - 79)  ABP: 103/76 (09 Sep 2021 14:00) (72/54 - 103/76)  ABP(mean): 87 (09 Sep 2021 14:00) (60 - 87)  RR: 18 (09 Sep 2021 14:00) (8 - 48)  SpO2: 100% (09 Sep 2021 14:00) (95% - 100%)      Physical Exam:  Gen:  Awake and alert, Sitting in chair in NAD.  Psych: Mood greatly improved but waxes and wanes, more interactive and participating in care. Would like to be more included in bedside rounding.  CNS:  intact, nonfocal, responds to all commands  Neck: no JVD, +TC   RES : course breath sounds, no wheezing, moderate tan secretions able to cough most up on own         CVS: +LVAD hum   Abd: Soft, RUQ tenderness by perc sybil site. Sybil drain with bilious fluid. Positive BS throughout.  Skin: No rash  Ext:  no edema    ============================I/O===========================   I&O's Detail    08 Sep 2021 07:01  -  09 Sep 2021 07:00  --------------------------------------------------------  IN:    Miscellaneous Tube Feedin mL    sodium chloride 0.9%: 30 mL  Total IN: 1335 mL    OUT:    Drain (mL): 500 mL    Voided (mL): 1025 mL  Total OUT: 1525 mL    Total NET: -190 mL      09 Sep 2021 07:01  -  09 Sep 2021 14:19  --------------------------------------------------------  IN:    Enteral Tube Flush: 120 mL    Miscellaneous Tube Feedin mL  Total IN: 450 mL    OUT:    Drain (mL): 70 mL    Voided (mL): 400 mL  Total OUT: 470 mL    Total NET: -20 mL        ============================ LABS =========================                        7.8    13.65 )-----------( 208      ( 09 Sep 2021 08:34 )             25.5         136  |  100  |  14  ----------------------------<  135<H>  3.8   |  27  |  0.46<L>    Ca    9.4      09 Sep 2021 08:34  Phos  2.9       Mg     1.7         TPro  7.2  /  Alb  2.9<L>  /  TBili  0.4  /  DBili  x   /  AST  63<H>  /  ALT  194<H>  /  AlkPhos  255<H>      LIVER FUNCTIONS - ( 09 Sep 2021 08:34 )  Alb: 2.9 g/dL / Pro: 7.2 g/dL / ALK PHOS: 255 U/L / ALT: 194 U/L / AST: 63 U/L / GGT: x             ABG - ( 09 Sep 2021 08:20 )  pH, Arterial: 7.42  pH, Blood: x     /  pCO2: 51    /  pO2: 149   / HCO3: 33    / Base Excess: 7.6   /  SaO2: 99.8                ======================Micro/Rad/Cardio=================  Culture: Reviewed   CXR: Reviewed  Echo:Reviewed  ======================================================  PAST MEDICAL & SURGICAL HISTORY:  CHF (congestive heart failure)    CAD (coronary artery disease)    Depression    Pleural effusion    History of 2019 novel coronavirus disease (COVID-19)  2020    Hemorrhoids    Bleeding hemorrhoids    Peripheral arterial disease    Claudication    BPH with urinary obstruction    ACC/AHA stage D systolic heart failure    Anticoagulation goal of INR 2.0 to 2.5    Falls    Clavicle fracture    CKD (chronic kidney disease), stage III    Iron deficiency anemia    H/O epistaxis    Vertigo    GI bleed    S/P TVR (tricuspid valve repair)    S/P ventricular assist device    S/P endoscopy      ====================ASSESSMENT ==============  Stage D Nonischemic Cardiomyopathy, Status Post HM2 on 2017    Cardiogenic shock  Hemodynamic instability   Acute hypoxemic respiratory failure s/p trach   GI bleed , Status Post Enteroscopy   Anemia, in setting of melena   Chronic Kidney Disease  Stress hyperglycemia   C.diff positive on    Hypovolemic shock  Septic shock  Leukocytosis  GB thickening/percholecystic s/p perc choley by IT   SMA stenosis  Serratia/citrobacter pneumonia   Stenotrophomonas pneumonia   Enterobacter pneumonia   Nasuea/vomiting  Deconditioning      Plan:  ====================== NEUROLOGY=====================  Nonfocal, continue to monitor neuro status   C/w mirtazapine and sertraline for depression  Deconditioned, PT/Ambulate as tolerated    mirtazapine 7.5 milliGRAM(s) Oral daily  sertraline 100 milliGRAM(s) Oral daily    ==================== RESPIRATORY======================  Acute hypoxemic respiratory failure s/p #8 shiley trach on , continue TC as tolerated (TC since )  Continue close monitoring of respiratory rate and breathing pattern, following of ABG’s with A-line monitoring, continuous pulse oximetry monitoring.   Moderate secretions, continue suctioning prn, pulmonary toileting.     ====================CARDIOVASCULAR==================  Stage D Nonischemic Cardiomyopathy, Status Post HM2 on 2017; LVAD settings 9200, flow 4.7   TTE : reveals at least moderate MR, mild AI, severe global LV syst dysfxn, dec RV fxn   Propranolol for rate control of HR 2/2 Hyperthyroidism, titrate as tolerated per Endo  Continue invasive hemodynamic monitoring     propranolol 40 milliGRAM(s) Oral every 8 hours    ===================HEMATOLOGIC/ONC ===================  Acute blood loss anemia, monitor H&H/Plts    Holding coumadin and ASA given recurrent GI bleeding. Continue to monitor LDH daily.    VTE prophylaxis, heparin   Injectable 5000 Unit(s) SubCutaneous every 8 hours    ===================== RENAL =========================  Continue monitoring urine output, I&OS, BUN/Cr   Euvolemic, even fluid balance, currently off diuretics.    Replete lytes PRN. Keep K> 4 and Mg >2    ==================== GASTROINTESTINAL===================  S/p cholecystostomy tube placed on  with IR   CTA A/P : Evaluation of the mesenteric vessels is limited by streak artifact from LVAD. There appears to be severe stenosis of the proximal SMA; abdominal mesenteric doppler is recommended for further evaluation. 2.  No small bowel findings to suggest acute mesenteric ischemia. 3.  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  Mesenteric duplex on 8/15 borderline stenosis of proximal SMA. Mesenteric angiogram tentatively scheduled for next week.   Pending RUQ US results to reassess gallbladder with perc sybil tube  Vital 1.5 shantelle feeds - Continue increasing tube feeds 5cc/day for goal of 65cc/hr as tolerated  CT Abd/Pel on  without acute process   Reglan for gut motility  Zofran PRN nausea/vomiting     multivitamin 1 Tablet(s) Oral daily  GI prophylaxis, pantoprazole  Injectable 40 milliGRAM(s) IV Push every 12 hours  simethicone 80 milliGRAM(s) Chew every 8 hours PRN Gas  sodium chloride 0.9%. 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     dextrose 40% Gel 15 Gram(s) Oral once  dextrose 50% Injectable 25 Gram(s) IV Push once  dextrose 50% Injectable 12.5 Gram(s) IV Push once  glucagon  Injectable 1 milliGRAM(s) IntraMuscular once  hydrocortisone sodium succinate Injectable 50 milliGRAM(s) IV Push two times a day  insulin lispro (ADMELOG) corrective regimen sliding scale   SubCutaneous every 6 hours  insulin NPH human recombinant 6 Unit(s) SubCutaneous <User Schedule>  methimazole 20 milliGRAM(s) Oral <User Schedule>    ========================INFECTIOUS DISEASE================  Afebrile, WBC rising 12.93->13.65   Continue trending WBC and monitoring fever curve   Culture from sybil drain on : NGTD  SCx on  +rare gram positive cocci and gram variable rods  Repeat SCx yesterday +GPC in pairs, will f/u to get patient off isolation   ID following, MD Prater, appreciate recommendations      Patient requires continuous monitoring with bedside rhythm monitoring, pulse ox monitoring, and intermittent blood gas analysis. Care plan discussed with ICU care team. Patient remained critical and at risk for life threatening decompensation.     By signing my name below, I, Agnieszka Cherry, attest that this documentation has been prepared under the direction and in the presence of Jaclyn Mendoza NP   Electronically signed: Cesia Shaffer, 21 @ 14:19    I, Jaclyn Mendoza,, personally performed the services described in this documentation. all medical record entries made by the luisibmel were at my direction and in my presence. I have reviewed the chart and agree that the record reflects my personal performance and is accurate and complete  Electronically signed: Jaclyn Mendoza NP

## 2021-09-09 NOTE — PROGRESS NOTE ADULT - ASSESSMENT
65 year old male with history of Stage D HF due to NICM on LVAD, TV annuloplasty ring 9/12/17 as destination therapy due to severe peripheral artery disease with significant stenosis  SIADH, Depression, CKD-3 with hyperkalemia, admitted 6/14/21 with symptomatic anemia with Hgb 4.5 in setting of INR 8.8, thereafter with complicated hospital course including VAP, serratia bacteremia with acalculous cholecystitis s/p percutaneous tube, abdominal pain with unclear source other than possible mesenteric ischemia (from possible SMA stenosis? 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 Nearly two months later, on 8/7 - TSH was < 0.01  Likely 2/2 amiodarone use, exacerbated by IV contrast imaging (7/28 and 8/13). Toxic nodule is possible. Less likely Graves (negative TSH receptor Ab and TSI, but TPO Ab positive at 49.9).   Patient has no known prior hx of thyroid disease (slightly elevated TSH to 4.92 in 2017 when acutely ill), which is the case in Type 2 Amiodarone induced thyroiditis. However, thyrotoxicosis presented shortly after initiation of amiodarone, which is more characteristic of Type 1 Amiodarone induced thyroiditis. Patient also has positive TPO Ab, which can also be seen in Type 1 Amiodarone induced thyrotoxicosis    Labs from 9/9 - improving  FT4 5.0 (from 5.3)  TT3 110 (from 126 and 165)    Recs  -Ideally, contrast imaging should be deferred until biochemically euthyroid as further iodine load can exacerbated thyrotoxicosis. However, if contrast imaging is required, risks of deferring the imaging need to be weighed against the risks of further contrast load in this setting.   -Recheck only free T4 and total T3 on 9/11/21  -Continue Methimazole to 20 mg daily.   -Alk phos, AST and ALT are elevated and stable range, but etiologies can be multifactorial. Watch for sudden changes in LFTs or biliary labs  -Continue Propranolol 40 mg q 8 hrs. Goal HR 60-80. At goal   -Continues teroid taper as follows;   1. 9/8, 9/9, 9/10: Hydrocortisone 50 mg q 12 hrs, stop after 6 doses  2. 9/11, 9/12, 9/13: Hydrocortisone 25 mg q 12 hrs, stop after 6 doses  3. 9/14, 9/15: Hydrocortisone 25 mg daily, stop after 2 doses  Patient can then stop steroids thereafter    #Steroid induced hyperglycemia   HbA1c 5.4%. FS at goal 100-180  -can c/w NPH 6 units q6 hrs. Currently below goal TF rate (at 50 cc/hr, goal is 65 cc/hr). Will address insulin requirements daily as feed rate increases.   -continue moderate scale sq q6 if on tube feeds  -if no tube feeds are on, low dose ISS q6hrs and hold NPH  -BS testing q6      Sia Aceves MD  Endocrine Fellow  Pager: [phone]/TIMBO [phone]  Consults 9am-5pm: [phone]  After 5pm and weekends: [phone]     65 year old male with history of Stage D HF due to NICM on LVAD, TV annuloplasty ring 9/12/17 as destination therapy due to severe peripheral artery disease with significant stenosis  SIADH, Depression, CKD-3 with hyperkalemia, admitted 6/14/21 with symptomatic anemia with Hgb 4.5 in setting of INR 8.8, thereafter with complicated hospital course including VAP, serratia bacteremia with acalculous cholecystitis s/p percutaneous tube, abdominal pain with unclear source other than possible mesenteric ischemia (from possible SMA stenosis? 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 Nearly two months later, on 8/7 - TSH was < 0.01  Likely 2/2 amiodarone use, exacerbated by IV contrast imaging (7/28 and 8/13). Toxic nodule is possible. Less likely Graves (negative TSH receptor Ab and TSI, but TPO Ab positive at 49.9).   Patient has no known prior hx of thyroid disease (slightly elevated TSH to 4.92 in 2017 when acutely ill), which is the case in Type 2 Amiodarone induced thyroiditis. However, thyrotoxicosis presented shortly after initiation of amiodarone, which is more characteristic of Type 1 Amiodarone induced thyroiditis. Patient also has positive TPO Ab, which can also be seen in Type 1 Amiodarone induced thyrotoxicosis    Labs from 9/9 - improving  FT4 5.0 (from 5.3)  TT3 110 (from 126 and 165)    Recs  -Ideally, contrast imaging should be deferred until the patient is biochemically euthyroid as further iodine load can exacerbate thyrotoxicosis. However, if contrast imaging is required, risks of deferring the imaging need to be weighed against the risks of further contrast load in this setting.   -Recheck only free T4 and total T3 on 9/11/21  -Continue Methimazole to 20 mg daily.   -Alk phos, AST and ALT are elevated and stable range, but etiologies can be multifactorial. Watch for sudden changes in LFTs or biliary labs  -Continue Propranolol 40 mg q 8 hrs. Goal HR 60-80. At goal   -Continues teroid taper as follows;   1. 9/8, 9/9, 9/10: Hydrocortisone 50 mg q 12 hrs, stop after 6 doses  2. 9/11, 9/12, 9/13: Hydrocortisone 25 mg q 12 hrs, stop after 6 doses  3. 9/14, 9/15: Hydrocortisone 25 mg daily, stop after 2 doses  Patient can then stop steroids thereafter    #Steroid induced hyperglycemia   HbA1c 5.4%. FS at goal 100-180  -can c/w NPH 6 units q6 hrs. Currently below goal TF rate (at 50 cc/hr, goal is 65 cc/hr). Will address insulin requirements daily as feed rate increases.   -continue moderate scale sq q6 if on tube feeds  -if no tube feeds are on, low dose ISS q6hrs and hold NPH  -BS testing q6      Sia Aceves MD  Endocrine Fellow  Pager: [phone]/TIMBO [phone]  Consults 9am-5pm: [phone]  After 5pm and weekends: [phone]     65 year old male with history of Stage D HF due to NICM on LVAD, TV annuloplasty ring 9/12/17 as destination therapy due to severe peripheral artery disease with significant stenosis  SIADH, Depression, CKD-3 with hyperkalemia, admitted 6/14/21 with symptomatic anemia with Hgb 4.5 in setting of INR 8.8, thereafter with complicated hospital course including VAP, serratia bacteremia with acalculous cholecystitis s/p percutaneous tube, abdominal pain with unclear source other than possible mesenteric ischemia (from possible SMA stenosis? 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 Nearly two months later, on 8/7 - TSH was < 0.01  Likely 2/2 amiodarone use, exacerbated by IV contrast imaging (7/28 and 8/13). Toxic nodule is possible. Less likely Graves (negative TSH receptor Ab and TSI, but TPO Ab positive at 49.9).   Patient has no known prior hx of thyroid disease (slightly elevated TSH to 4.92 in 2017 when acutely ill), which is the case in Type 2 Amiodarone induced thyroiditis. However, thyrotoxicosis presented shortly after initiation of amiodarone, which is more characteristic of Type 1 Amiodarone induced thyroiditis. Patient also has positive TPO Ab, which can also be seen in Type 1 Amiodarone induced thyrotoxicosis    Labs from 9/9 - improving  FT4 5.0 (from 5.3)  TT3 110 (from 126 and 165)    Recs  -Ideally, contrast imaging should be deferred until the patient is biochemically euthyroid as further iodine load can exacerbate thyrotoxicosis. However, if contrast imaging is required, risks of deferring the imaging need to be weighed against the risks of further contrast load in this setting.   -Recheck only free T4 and total T3 on 9/13/21  -Continue Methimazole to 20 mg daily.   -Alk phos, AST and ALT are elevated and stable range, but etiologies can be multifactorial. Watch for sudden changes in LFTs or biliary labs  -Continue Propranolol 40 mg q 8 hrs. Goal HR 60-80. At goal   -Continues teroid taper as follows;   1. 9/8, 9/9, 9/10: Hydrocortisone 50 mg q 12 hrs, stop after 6 doses  2. 9/11, 9/12, 9/13: Hydrocortisone 25 mg q 12 hrs, stop after 6 doses  3. 9/14, 9/15: Hydrocortisone 25 mg daily, stop after 2 doses  Patient can then stop steroids thereafter    #Steroid induced hyperglycemia   HbA1c 5.4%. FS at goal 100-180  -can c/w NPH 6 units q6 hrs. Currently below goal TF rate (at 50 cc/hr, goal is 65 cc/hr). Will address insulin requirements daily as feed rate increases.   -continue moderate scale sq q6 if on tube feeds  -if no tube feeds are on, low dose ISS q6hrs and hold NPH  -BS testing q6      Sia Aceves MD  Endocrine Fellow  Pager: [phone]/TIMBO [phone]  Consults 9am-5pm: [phone]  After 5pm and weekends: [phone]

## 2021-09-09 NOTE — PROGRESS NOTE ADULT - PROBLEM SELECTOR PLAN 3
- Chronic in nature with questionable SMA stenosis on imaging and mesenteric duplex difficult to interpret with continuous flow. Holding off on mesenteric angiogram at this time. Will need to discuss risk vs benefits of obtaining given his recent diagnosis of thyrotoxicosis.   - Continue to advance feeds as tolerates, goal rate 65 cc/hr  - Discussed cholecystostomy with GI surgery, unlikely to derive symptomatic benefit since perc dawn tube continues to drain. Repeat RUQ shows contracted gallbladder - Chronic in nature with questionable SMA stenosis on imaging and mesenteric duplex difficult to interpret with continuous flow. Plan for angiogram tomorrow. Will need to monitor TFTs given contrast exposure.   - Continue to advance feeds as tolerates, goal rate 65 cc/hr  - Discussed cholecystostomy with GI surgery, unlikely to derive symptomatic benefit since perc dawn tube continues to drain. Repeat RUQ shows contracted gallbladder

## 2021-09-09 NOTE — PROGRESS NOTE ADULT - ASSESSMENT
65 YO M with a history of stage D NICM s/p HM2 on 9/2017 as DT (due to severe PAD) with TV ring, prior COVID-19 infection 4/2020, recurrent syncope post LVAD s/p ILR, and chronic abdominal pain with prior negative workup who was admitted 6/14/21 with symptomatic anemia with Hgb 4.5 in setting of INR 8.8 without hemodynamic instability and has since had a protracted hospitalization. He was transfused and underwent VCE which showed active bleeding in the mid small bowel but subsequent enteroscopy 6/15 did not reveal any active bleeding. He acutely decompensated after procedure with fever/hypertension, low flow alarms, and pulmonary infiltrate with hypoxia requiring intubation from probable aspiration PNA. He was unable to extubated and has since undergone tracheostomy but tolerating persistent trach collar and nearing ability for decannulation. 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 Short term goal is maintenance of adequate nutrition and eventual trach decannulation.     Will continue to advance tube feeds as tolerated and work aggressively with PT. Will need to discuss with team about risk vs benefits of proceeding with angiogram given recent diagnosis of thyrotoxicosis on this admission.

## 2021-09-09 NOTE — PROGRESS NOTE ADULT - PROBLEM SELECTOR PLAN 7
- endocrine recs appreciated  - currently on methimazole, propranolol, and steroids  - Steroid taper as follows:   9/9, 9/10: Hydrocortisone 50 mg q 12 hrs, stop after 6 doses  9/11, 9/12, 9/13: Hydrocortisone 25 mg q 12 hrs, stop after 6 doses  9/14, 9/15: Hydrocortisone 25 mg daily, stop after 2 doses  Stop steroids thereafter.   - will need to obtain endocrine clearance prior to angiogram/contrast exposure given his history of thyrotoxicosis on this admission

## 2021-09-09 NOTE — PROGRESS NOTE ADULT - PROBLEM SELECTOR PLAN 2
- Continue current speed of 9200 RPM. Speed increased this admission due to signs of inadequately unloaded left ventricle  - MAP is at goal at this time  - Holding coumadin and aspirin indefinitely given recurrent GI bleeding.   - Continue to monitor LDH daily, slowly up-trending

## 2021-09-10 NOTE — PROGRESS NOTE ADULT - PROBLEM SELECTOR PLAN 3
- Chronic in nature with questionable SMA stenosis on imaging and mesenteric duplex difficult to interpret with continuous flow. Undergoing angiogram today. If a stent is placed will need to closely monitor patient given his previous history of recurrent GIB. Will also need to monitor TFTs given contrast exposure.   - Continue to advance feeds as tolerates, goal rate 65 cc/hr  - Discussed cholecystostomy with GI surgery, unlikely to derive symptomatic benefit since perc dawn tube continues to drain. Repeat RUQ shows contracted gallbladder

## 2021-09-10 NOTE — PROGRESS NOTE ADULT - ATTENDING COMMENTS
Feels well this am. He is in good spirits.  Tube feeds are on hold for probable angiogram.  No acute events overnight.  Leukocytosis and LFTs are improving.   No VAD alarms.

## 2021-09-10 NOTE — PROGRESS NOTE ADULT - PROBLEM SELECTOR PLAN 4
What Type Of Note Output Would You Prefer (Optional)?: Standard Output What Is The Reason For Today's Visit?: Full Body Skin Examination What Is The Reason For Today's Visit? (Being Monitored For X): the development of new lesions How Severe Are Your Spot(S)?: mild Additional History: Melanoma removed in 2006 by Dr. Plummer remove the skin cancer on the arm. Patient also has a history of squamous cell carcinoma on the leg. - serratia bacteremia and poss acalculous cholecystitis. B/c with gram + cocci and many enterobacter Carbapenem resistant strain and now s/p per dawn drain  - also with enterobacter from sputum culture 8/13  - off antibiotics  - WBC improved

## 2021-09-10 NOTE — PROGRESS NOTE ADULT - SUBJECTIVE AND OBJECTIVE BOX
RICKY HAMPTON  MRN-[id number]  Patient is a 66y old  Male who presents with a chief complaint of Anemia, Supratherapeutic INR, Dark Stools (10 Sep 2021 20:22)    HPI:  65M PMH ACC/AHA stage D HF due to NICM HM2 LVAD , TV annuloplasty ring 17 as destination therapy due to severe peripheral artery disease with significant stenosis  SIADH, Depression, CKD-3 with hyperkalemia, past E. coli UTIs, driveline drainage (21) and COVID-19 (back in 2020)  He was recently seen in clinic where he complained of abdominal pain and dark stools w constipation back in May. He presents to [facility] ER today weakness and fatigue, moderate and + Black stools for three days, on coumadin secondary to warfarin use in the setting of an LVAD. Patient has required transfusions for GIB in the past. Mostly recently back in 2021 pt had anemia with dark stools. No interventions was done at that time. However Last Endoscopy was done in 2020 (negative). Today labs show patient is anemic with H/H of 4.5/16.3,. INR is 8.84 MAP in the 90s, Temp 35.1. He denies any chest pain, shortness of breath, dizziness, abd pain, nausea or vomiting.       (2021 16:57)      Surgery/Hospital course:   admit for melena w/ anemia, INR 8.84   6/15 Capsul study (+) for small bowel bleed, balloon endoscopy (old blood in prox ileum); post EGD - septic w/ L opacity, re-intubated for concern for aspiration, TTE (Mod MR, decrease biV w/ interventricular septum boweing towards R)   bronch    +C Diff    CT C/A/P: Fluid filled colon which may be 2/2 rapid transit. Small bilateral pleffs with associates. Compressive atelectasis New ISABELLE & LLL  parenchymal opacities, suspicious for pneumonia. 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    #8 Shiley trach at bedside    LVAD speed increased to 9200   Bronch   TC since . Patient transferred to SDU.    INR today 2.64.  H&H 7.3/24 this AM.  Will repeat CBC at noon, and will send stool guaiac Patient with persistent abdominal tenderness, rate of tube feeds decreased.  No nausea/vomiting.     INR today 2.4. H&H 9.1/28.6 low flow overnight /N&V, refusing Tube feeds on D5 1/2 normal  @50 cc/hr   INR 2.69  H&H 7.7/.1 refusing Tube feeds on D5 1/2 normal  @50 cc/hr. This am + BM Melena Dr Oneill HF  aware- PRBC x1  GI team consulted -  NPO  plan on study in am-  D/w Dr Cadet Patient  to return to CTU for further management; 1PRBCS    Post op INR 2.2 today.  No bleeding. BC + for SM.  Pt is hypotensive requiring pressor and inotropic support.  ID follow up today on Cefepime will follow.   R PTC for PTX    CT C/A/P: sub q emphysema in R chest wall, GGO RUL, small ascites CTH negative; Abd US: GB thickening, pericholecystic fluid     Perchole drain in place continues to drain total output overnight 133.  Fever today 38.8    duplex LE negative    Patient with persistent abdominal pain, refusing tube feeds and medications, Psych consulted   CTA A/P ordered to r/o mesenteric ischemia 2/2 persistent anorexia, nausea, vomiting. Revealed:  Evaluation of the mesenteric vessels is limited by streak artifact from LVAD. There appears to be severe stenosis of the proximal SMA; abdominal mesenteric doppler is recommended for further evaluation. 2.  No small bowel findings to suggest acute mesenteric ischemia. 3.  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  8/15: Cultures resulted BC 1/2 +GPC in clusters, SC enterobacter; mesenteric duplex: borderline stenosis of proximal SMA  : CT C/A/P noncon: Nondular opacities in R lung apex w/cavitation, abd nl  :  Continue current care, treatment of thyrotoxicosis with medications as per endocrine, d/c ABX as per team.    RUQ sono     Today/Overnight:    Vital Signs Last 24 Hrs  T(C): 36.5 (10 Sep 2021 08:00), Max: 36.7 (10 Sep 2021 00:00)  T(F): 97.7 (10 Sep 2021 08:00), Max: 98.1 (10 Sep 2021 04:00)  HR: 78 (10 Sep 2021 13:30) (65 - 86)  BP: --  BP(mean): --  RR: 26 (10 Sep 2021 13:30) (12 - 39)  SpO2: 100% (10 Sep 2021 13:30) (100% - 100%)  ============================I/O===========================  I&O's Detail    09 Sep 2021 07:01  -  10 Sep 2021 07:00  --------------------------------------------------------  IN:    Enteral Tube Flush: 200 mL    Miscellaneous Tube Feedin mL  Total IN: 1080 mL    OUT:    Drain (mL): 495 mL    Voided (mL): 850 mL  Total OUT: 1345 mL    Total NET: -265 mL      10 Sep 2021 07:01  -  10 Sep 2021 20:34  --------------------------------------------------------  IN:  Total IN: 0 mL    OUT:    Drain (mL): 100 mL    Miscellaneous Tube Feedin mL    Stool (mL): 1 mL    Stool (mL): 1 mL    Voided (mL): 100 mL    Voided (mL): 100 mL  Total OUT: 302 mL    Total NET: -302 mL        ============================ LABS =========================                        8.2    9.49  )-----------( 236      ( 10 Sep 2021 00:43 )             27.1     -10    135  |  98  |  14  ----------------------------<  173<H>  4.2   |  27  |  0.44<L>    Ca    9.5      10 Sep 2021 00:43  Phos  3.7     09-10  Mg     1.8     09-10    TPro  7.3  /  Alb  3.1<L>  /  TBili  0.3  /  DBili  x   /  AST  47<H>  /  ALT  171<H>  /  AlkPhos  259<H>  09-10    LIVER FUNCTIONS - ( 10 Sep 2021 00:43 )  Alb: 3.1 g/dL / Pro: 7.3 g/dL / ALK PHOS: 259 U/L / ALT: 171 U/L / AST: 47 U/L / GGT: x           PT/INR - ( 10 Sep 2021 00:43 )   PT: 12.7 sec;   INR: 1.06 ratio         PTT - ( 10 Sep 2021 00:43 )  PTT:30.7 sec  ABG - ( 10 Sep 2021 19:24 )  pH, Arterial: 7.39  pH, Blood: x     /  pCO2: 40    /  pO2: 432   / HCO3: 24    / Base Excess: -0.7  /  SaO2: 100.0               ======================Micro/Rad/Cardio=================  Culture: Reviewed   CXR: Reviewed  Echo: Reviewed  ======================================================  PAST MEDICAL & SURGICAL HISTORY:  CHF (congestive heart failure)    CAD (coronary artery disease)    Depression    Pleural effusion    History of 2019 novel coronavirus disease (COVID-19)  2020    Hemorrhoids    Bleeding hemorrhoids    Peripheral arterial disease    Claudication    BPH with urinary obstruction    ACC/AHA stage D systolic heart failure    Anticoagulation goal of INR 2.0 to 2.5    Falls    Clavicle fracture    CKD (chronic kidney disease), stage III    Iron deficiency anemia    H/O epistaxis    Vertigo    GI bleed    S/P TVR (tricuspid valve repair)    S/P ventricular assist device    S/P endoscopy      ========================ASSESSMENT ================  Stage D Nonischemic Cardiomyopathy, Status Post HM2 on 2017    Cardiogenic shock  Hemodynamic instability   Acute hypoxemic respiratory failure s/p trach   GI bleed , Status Post Enteroscopy   Anemia, in setting of melena   Chronic Kidney Disease  Stress hyperglycemia   C.diff positive on    Hypovolemic shock  Septic shock  Leukocytosis  GB thickening/percholecystic s/p perc choley by IT   SMA stenosis  Serratia/citrobacter pneumonia   Stenotrophomonas pneumonia   Enterobacter pneumonia   Nasuea/vomiting  Deconditioning    Plan:  ====================== NEUROLOGY=====================  continue to monitor neuro status   Deconditioned, PT/Ambulate as tolerated    ==================== RESPIRATORY======================  Acute hypoxemic respiratory failure s/p #8 shiley trach on , continue TC as tolerated (TC since )  Continue close monitoring of respiratory rate and breathing pattern, following of ABG’s with A-line monitoring, continuous pulse oximetry monitoring.   Moderate secretions, continue suctioning prn, pulmonary toileting.   Will consult SLP for a PMV eval    ====================CARDIOVASCULAR==================  Stage D Nonischemic Cardiomyopathy, Status Post HM2 on 2017; LVAD settings 9200, flow 5.2  TTE : reveals at least moderate MR, mild AI, severe global LV syst dysfxn, dec RV fxn   Continue invasive hemodynamic monitoring     ===================HEMATOLOGIC/ONC ===================  Acute blood loss anemia, monitor H&H/Plts    Holding coumadin and ASA given recurrent GI bleeding. Continue to monitor LDH daily.    ===================== RENAL =========================    Holding coumadin and ASA given recurrent GI bleeding. Continue to monitor LDH daily.  Continue monitoring I&Os, BUN/Cr, and urine output   Replete lytes PRN. Keep K> 4 and Mg >2     ==================== GASTROINTESTINAL===================  S/p cholecystostomy tube placed on  with IR   CTA A/P : Evaluation of the mesenteric vessels is limited by streak artifact from LVAD. There appears to be severe stenosis of the proximal SMA; abdominal mesenteric doppler is recommended for further evaluation. 2.  No small bowel findings to suggest acute mesenteric ischemia. 3.  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  Mesenteric duplex on 8/15 borderline stenosis of proximal SMA. Mesenteric angiogram tentatively scheduled for next week.   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   ========================INFECTIOUS DISEASE================  Afebrile, WBC within normal limits  Continue trending WBC and monitoring fever curve   Culture from dawn drain on : NGTD  SCx on  +rare gram positive cocci and gram variable rods  Repeat SCx  +GPC in pairs, will f/u to get patient off isolation   ID following, MD Prater, appreciate recommendations  Patient requires continuous monitoring with bedside rhythm monitoring, pulse oximetry monitoring, and continuous central venous and arterial pressure monitoring; and intermittent blood gas analysis.  Care plan discussed with ICU care team.    Patient remained critical, at risk for life threatening decompensation.   I have spent 35 minutes providing acute care with multiple re-evaluations throughout the evening.     By signing my name below, I, Aparna Saleh, attest that this documentation has been prepared under the direction and in the presence of CLAUDIA Michael .  Electronically signed: Cesia Villegas Evan Mesh, personally performed the services described in this documentation. All medical record entries made by the scribe were at my direction and in my presence. I have reviewed the chart and agree that the record reflects my personal performance and is accurate and complete  Electronically signed: CLAUDIA Michael , 09-10-21 @ 20:34

## 2021-09-10 NOTE — PROGRESS NOTE ADULT - SUBJECTIVE AND OBJECTIVE BOX
Subjective: Patient seen and examined resting in bed. ON was made NPO for angiogram with vascular surgery     Medications:  dextrose 40% Gel 15 Gram(s) Oral once  dextrose 50% Injectable 25 Gram(s) IV Push once  dextrose 50% Injectable 12.5 Gram(s) IV Push once  glucagon  Injectable 1 milliGRAM(s) IntraMuscular once  hydrocortisone sodium succinate Injectable 50 milliGRAM(s) IV Push two times a day  insulin lispro (ADMELOG) corrective regimen sliding scale   SubCutaneous every 6 hours  insulin NPH human recombinant 6 Unit(s) SubCutaneous <User Schedule>  lidocaine   4% Patch 1 Patch Transdermal daily  methimazole 20 milliGRAM(s) Oral <User Schedule>  metoclopramide Injectable 10 milliGRAM(s) IV Push every 8 hours  mirtazapine 7.5 milliGRAM(s) Oral daily  multivitamin 1 Tablet(s) Oral daily  ondansetron Injectable 4 milliGRAM(s) IV Push every 6 hours PRN  pantoprazole  Injectable 40 milliGRAM(s) IV Push every 12 hours  propranolol 40 milliGRAM(s) Oral every 8 hours  sertraline 100 milliGRAM(s) Oral daily  simethicone 80 milliGRAM(s) Chew every 8 hours PRN  sodium chloride 0.9%. 1000 milliLiter(s) IV Continuous <Continuous>  thiamine 100 milliGRAM(s) Oral daily    ICU Vital Signs Last 24 Hrs  T(C): 36.5 (10 Sep 2021 08:00), Max: 36.7 (10 Sep 2021 00:00)  T(F): 97.7 (10 Sep 2021 08:00), Max: 98.1 (10 Sep 2021 04:00)  HR: 78 (10 Sep 2021 13:30) (65 - 86)  BP: --  BP(mean): --  ABP: 98/65 (10 Sep 2021 13:00) (58/58 - 120/85)  ABP(mean): 75 (10 Sep 2021 13:00) (58 - 98)  RR: 26 (10 Sep 2021 13:30) (12 - 39)  SpO2: 100% (10 Sep 2021 13:30) (100% - 100%)      Weight in k.7 (09-10 @ 00:00)    I&O's Summary    09 Sep 2021 07:01  -  10 Sep 2021 07:00  --------------------------------------------------------  IN: 1080 mL / OUT: 1345 mL / NET: -265 mL    10 Sep 2021 07:01  -  10 Sep 2021 14:50  --------------------------------------------------------  IN: 0 mL / OUT: 302 mL / NET: -302 mL      Physical Exam:   General: No distress. Comfortable.  HEENT: EOM intact.  Neck: JVP not elevated  Chest: +trach   CV: +VAD hum  Abdomen: Soft, RUQ tenderness by perc dawn site and driveline exit site. Dawn drain with bilious fluid. Positive BS throughout.  Neurology: Alert and oriented times three.     LVAD Interrogation  VAD TYPE HM 2  Speed 9200  Flow 4.7  Power 5.3  PI 6.9  Assessment of driveline exit site: driveline dressing c/d/i  Programming changes: no changes made    Labs:                        8.2    9.49  )-----------( 236      ( 10 Sep 2021 00:43 )             27.1     0910    135  |  98  |  14  ----------------------------<  173<H>  4.2   |  27  |  0.44<L>    Ca    9.5      10 Sep 2021 00:43  Phos  3.7     10  Mg     1.8     10    TPro  7.3  /  Alb  3.1<L>  /  TBili  0.3  /  DBili  x   /  AST  47<H>  /  ALT  171<H>  /  AlkPhos  259<H>  09-10    PT/INR - ( 10 Sep 2021 00:43 )   PT: 12.7 sec;   INR: 1.06 ratio         PTT - ( 10 Sep 2021 00:43 )  PTT:30.7 sec          Lactate Dehydrogenase, Serum: 193 U/L (09-10 @ 00:43)  Lactate Dehydrogenase, Serum: 289 U/L ( @ 08:34)  Lactate Dehydrogenase, Serum: 197 U/L ( @ 00:32)

## 2021-09-10 NOTE — PROGRESS NOTE ADULT - SUBJECTIVE AND OBJECTIVE BOX
INFECTIOUS DISEASES FOLLOW UP-- Anitra Prater  [phone]    This is a follow up note for this  66yMale with  Anemia/ GI bleed at time of admission    Acute cholecystitis s/p percutaneous cholecystotomy placement    for OR for SMA angiogram and possible stent placement today        ROS:  CONSTITUTIONAL:  No fever, improved spirits  CARDIOVASCULAR:  No chest pain or palpitations  RESPIRATORY:  No dyspnea  GASTROINTESTINAL:  No nausea, vomiting, diarrhea,   GENITOURINARY:  No dysuria  NEUROLOGIC:  No headache,     Allergies    Amiodarone Hydrochloride (Other (Severe))    Intolerances        ANTIBIOTICS/RELEVANT:  antimicrobials    immunologic:    OTHER:      Objective:  Vital Signs Last 24 Hrs  T(C): 36.5 (10 Sep 2021 08:00), Max: 36.7 (10 Sep 2021 00:00)  T(F): 97.7 (10 Sep 2021 08:00), Max: 98.1 (10 Sep 2021 04:00)  HR: 78 (10 Sep 2021 13:30) (65 - 86)  BP: --  BP(mean): --  RR: 26 (10 Sep 2021 13:30) (12 - 39)  SpO2: 100% (10 Sep 2021 13:30) (100% - 100%)    PHYSICAL EXAM:  Constitutional:no acute distress  Eyes:ALONSO, EOMI  Ear/Nose/Throat: no oral lesions, trach site no erythema	  Respiratory: coarse audible bilat  Cardiovascular: S1S2 VAD sounds  Gastrointestinal:soft, cholecystotomy tube draining bilious material  Extremities:no e/e/c  No Lymphadenopathy  IV sites not inflammed.    LABS:                        8.2    9.49  )-----------( 236      ( 10 Sep 2021 00:43 )             27.1     09-10    135  |  98  |  14  ----------------------------<  173<H>  4.2   |  27  |  0.44<L>    Ca    9.5      10 Sep 2021 00:43  Phos  3.7     09-10  Mg     1.8     09-10    TPro  7.3  /  Alb  3.1<L>  /  TBili  0.3  /  DBili  x   /  AST  47<H>  /  ALT  171<H>  /  AlkPhos  259<H>  09-10    PT/INR - ( 10 Sep 2021 00:43 )   PT: 12.7 sec;   INR: 1.06 ratio         PTT - ( 10 Sep 2021 00:43 )  PTT:30.7 sec      MICROBIOLOGY:            RECENT CULTURES:  09-08 @ 14:58  .Sputum Sputum  --  --  --    Normal Respiratory Bria present  --  09-07 @ 18:32  .Sputum Sputum  --  --  --    Normal Respiratory Bria present  --      RADIOLOGY & ADDITIONAL STUDIES:    < from: Xray Chest 1 View- PORTABLE-Routine (Xray Chest 1 View- PORTABLE-Routine in AM.) (09.09.21 @ 02:14) >  The heart is slightly enlarged. The lungs appear to be clear. A tracheostomy tube is in good position. A left ventricular assisted device is present. An NG tube is in the stomach however its tip is not seen on the current study. A loop recorder is overlying the left hemithorax. Status post sternotomy. The overall appearance of the chest remain unchanged when compared to previous study done September 7, 2021 at 1:46 AM.    < end of copied text >

## 2021-09-10 NOTE — PROGRESS NOTE ADULT - ASSESSMENT
67 YO M with a history of stage D NICM s/p HM2 on 9/2017 as DT (due to severe PAD) with TV ring, prior COVID-19 infection 4/2020, recurrent syncope post LVAD s/p ILR, and chronic abdominal pain with prior negative workup who was admitted 6/14/21 with symptomatic anemia with Hgb 4.5 in setting of INR 8.8 without hemodynamic instability and has since had a protracted hospitalization. He was transfused and underwent VCE which showed active bleeding in the mid small bowel but subsequent enteroscopy 6/15 did not reveal any active bleeding. He acutely decompensated after procedure with fever/hypertension, low flow alarms, and pulmonary infiltrate with hypoxia requiring intubation from probable aspiration PNA. He was unable to extubated and has since undergone tracheostomy but tolerating persistent trach collar and nearing ability for decannulation. 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 Short term goal is maintenance of adequate nutrition and eventual trach decannulation.     Undergoing angiogram today with vascular surgery. Will continue to advance tube feeds as tolerated and work aggressively with PT.

## 2021-09-10 NOTE — CHART NOTE - NSCHARTNOTEFT_GEN_A_CORE
Post Operative Check    Patient is post op from a mesenteric angio and is under close monitoring in the CT ICU    Vitals    T(C): 36.1 (09-10-21 @ 20:00), Max: 36.7 (09-10-21 @ 00:00)  HR: 79 (09-10-21 @ 21:15) (65 - 84)  BP: --  RR: 15 (09-10-21 @ 21:15) (12 - 39)  SpO2: 100% (09-10-21 @ 21:15) (100% - 100%)       @ 07:01  -  09-10 @ 07:00  --------------------------------------------------------  IN:    Enteral Tube Flush: 200 mL    Miscellaneous Tube Feedin mL  Total IN: 1080 mL    OUT:    Drain (mL): 495 mL    Voided (mL): 850 mL  Total OUT: 1345 mL    Total NET: -265 mL      09-10 @ 07:01  -  09-10 @ 21:55  --------------------------------------------------------  IN:    Albumin 5%  - 250 mL: 250 mL  Total IN: 250 mL    OUT:    Drain (mL): 100 mL    Indwelling Catheter - Urethral (mL): 60 mL    Miscellaneous Tube Feedin mL    Stool (mL): 1 mL    Stool (mL): 1 mL    Voided (mL): 100 mL    Voided (mL): 100 mL  Total OUT: 362 mL    Total NET: -112 mL      Labs                        10.2   19.89 )-----------( 209      ( 10 Sep 2021 20:48 )             31.7       CBC Full  -  ( 10 Sep 2021 20:48 )  WBC Count : 19.89 K/uL  Hemoglobin : 10.2 g/dL  Hematocrit : 31.7 %  Platelet Count - Automated : 209 K/uL  Mean Cell Volume : 90.8 fl  Mean Cell Hemoglobin : 29.2 pg  Mean Cell Hemoglobin Concentration : 32.2 gm/dL  Auto Neutrophil # : x  Auto Lymphocyte # : x  Auto Monocyte # : x  Auto Eosinophil # : x  Auto Basophil # : x  Auto Neutrophil % : x  Auto Lymphocyte % : x  Auto Monocyte % : x  Auto Eosinophil % : x  Auto Basophil % : x      Physical Exam  General: sedated  LLE: WWP, +DP pulse, dressing c/d/i, unable to appreciate significant groin hematoma      Patient is a 66y old Male s/p mesenteric angio. Under close monitoring in the CTICU.  -excellent care per CTICU

## 2021-09-10 NOTE — PROGRESS NOTE ADULT - SUBJECTIVE AND OBJECTIVE BOX
RICKY JOINT  MRN#: [id number]  Subjective:  Pulmonary progress  : recurrent Acute hypoxic respiratory Failure ,aspiration pneumonia, NICM  , chart reviewed and H/O obtained radiological and Laboratory study reviewed patient Examined     65M PMH ACC/AHA stage D HF due to NICM HM2 LVAD , TV annuloplasty ring 17 as destination therapy due to severe peripheral artery disease with significant stenosis  SIADH, Depression, CKD-3 with hyperkalemia, past E. coli UTIs, driveline drainage (21) and COVID-19 (back in 2020)  He was recently seen in clinic where he complained of abdominal pain and dark stools w constipation back in May. He presents to [facility] ER today weakness and fatigue, moderate and + Black stools for three days, on coumadin secondary to warfarin use in the setting of an LVAD. Patient has required transfusions for GIB in the past. Mostly recently back in 2021 pt had anemia with dark stools. No interventions was done at that time. However Last Endoscopy was done in 2020 (negative). Today labs show patient is anemic with H/H of 4.5/16.3,. INR is 8.84 MAP in the 90s, Temp 35.1. He denies any chest pain, shortness of breath, dizziness, abd pain, nausea or vomiting. found to have  rectal bleeding underwent endoscopy ,old blood in the proximal ileum ,  develop sepsis with LL opacity given Antibiotics , Extubated , reintubated , Bronchoscopy on Zosyn for LL pneumonia  and Amiodarone S/P TV Annuloplasty , patient remain intubated on full ventilatory support .S/P multiple units of blood transfusion , remain on full ventilatory support on Precedex and propofol , new central IJ line , diarrhea C diff. +ve on po vancomycin and IV Flagyl,  mildly distended belly , fever start on cefepime 2gm q 8 hrs S/P tracheostomy .  new RT Subclavian central line continue on contact  isolation ,still diarrhea on C-diff antibiotics ENT follow up appreciated , trial of C-PAP as tolerated , , copious secretion from trach. site chest x ray left lower lobe pneumonia , tolerating trch. collar 50% FI02 still excessive secretion need pulmonary toilet , off Ancef antibiotic , no more diarrhea back on full support mechanical ventilator , chest x ray show improvement in LLL air space disease, more awake and responsive on tube feeding no more diarrhea , S/P  Ancef for bacteremia no fever ,ID follow up noted ,  no nausea or vomiting or diarrhea still very weak and tired , event note pulled NG tube now replaced , back on tube feeding ,still on po vancomycin , getting PT and OT at bed side , no plan for decannulation for now. , no more diarrhea receiving PT and OPT at bed side , minimal secretion from tracheostomy site , no SOB getting stronger , improve muscle tone patient transfer to monitor bed still on contact isolation for C-Difficel colitis on 50% FI02, NG tube clogged , and change to resume tube feeding still loose stool . H/H drop significantly require blood transfusion , most likely GI bleeding , IV heparin D/C , vomiting 200 cc of creamy color tube feeding on hold no sob, still melena monitor in the CTU possible capsule endoscopy , H/H is stable ., patient develop TR sided  pneumothorax require chest tube placement , RT IJ central line  placed , develop fever shaking chills , blood culture positive for serratia marcescens , start on cefepime .the patient  become hypoxic and hypotensive placed on full ventilatory support and Vasopressin , levophed and Dobutamine ,S/P blood transfusion on meropenem and vancomycin ,   , on and  off pressors , occasional agitation on Precedex .S/P IR cholecystostomy tube drainage placement in the RT upper Quadrant , resume anticoagulation chest x ray noted C-PAP trail lasted only for 2 hrs , new RT SC line and D/C RT IJ line , RT pig tail cathter has been removed , tolerating C-PAP trial placed on trach. collar 50% FI02 GI consultant noted on NG tube feeding as tolerated , develop AF RVR S/P  bolus Amiodarone  back to regular sinus Rhythm , Flat Affect depressed , back on tube feeding Vital AF at 60 cc/ hr .still intermittent abdominal pain , no fever saturation is accepted  back on full ventilatory support , tired and sleepy on round  .start  back on  tube feeding . tolerating it very well , no nausea or vomiting , good 02 saturation on trac-collar on methimazole for hyperthyroidism , no new C/O no plan for decannulation yet , electrolyte blood test is still pending .on respiratory isolation sputum culture is negative , increase WBC  improved on cefepime , sputum positive for enterococcus , condition is the same ,still C/O Abdominal pain , white count is improving , no chest pain or SOB ,  .placed on Reglan 10 mg TID for gastric motility , depressed , withdrawn. on full NG tube diet with Vital , secretion are much improved , went for mesenteric angiogram , unable to stent SMA        (2021 16:57)    PAST MEDICAL & SURGICAL HISTORY:  CHF (congestive heart failure)    CAD (coronary artery disease)  Depression    Pleural effusion    History of 2019 novel coronavirus disease (COVID-19)  2020    Hemorrhoids    Bleeding hemorrhoids    Peripheral arterial disease    Claudication    BPH with urinary obstruction    ACC/AHA stage D systolic heart failure  Anticoagulation goal of INR 2.0 to 2.5    Falls    Clavicle fracture    CKD (chronic kidney disease), stage III    Iron deficiency anemia    H/O epistaxis    Vertigo    GI bleed    S/P TVR (tricuspid valve repair)    S/P ventricular assist device    S/P endoscopy    OBJECTIVE:  ICU Vital Signs Last 24 Hrs  T(C): 38.2 (2021 10:00), Max: 38.5 (2021 12:00)  T(F): 100.8 (2021 10:00), Max: 101.3 (2021 12:00)  HR: 65 (2021 10:00) (61 - 69)  BP: --  BP(mean): --  ABP: 105/67 (2021 10:00) (90/54 - 113/64)  ABP(mean): 77 (2021 10:00) (63 - 77)  RR: 20 (2021 10:00) (19 - 35)  SpO2: 99% (2021 10:00) (96% - 100%)       @ 07: @ 07:00  --------------------------------------------------------  IN: 2693.9 mL / OUT: 1415 mL / NET: 1278.9 mL     @ 07:01  -   @ 10:49  --------------------------------------------------------  IN: 420.8 mL / OUT: 115 mL / NET: 305.8 mL  PHYSICAL EXAM:Daily   Elderly male S/P tracheostomy on trach. collar 50% FI02 ,  Daily Weight in k.4 (2021 00:00)  HEENT:     + NCAT  + EOMI  - Conjuctival edema   - Icterus   - Thrush   - ETT  + NGT/OGT  Neck:         + FROM  RT IJ  line JVD  - Nodes - Masses + Mid-line trachea + Tracheostomy  Chest:            normal A-P diameter    Lungs:          + CTA   + Rhonchi    - Rales    - Wheezing + Decreased  LT BS   - Dullness R L  Cardiac:       + S1 + S2    + RRR   - Irregular   - S3  - S4    - Murmurs   - Rub   - Hamman’s sign   Abdomen:    + BS  + Soft + Non-tender - Distended - Organomegaly - PEG .cholecystostomy tube in place  Extremities:   - Cyanosis U/L   - Clubbing  U/L  + LE/UE Edema   + Capillary refill    + Pulses   Neuro:        - Awake   -  Alert   - Confused   - Lethargic   + Sedated  + Generalized Weakness  Skin:        - Rashes    - Erythema   + Normal incisions   + IV sites intact          HOSPITAL MEDICATIONS: All medications reviewed and analyzed  MEDICATIONS  (STANDING):  amiodarone    Tablet 200 milliGRAM(s) Oral daily  chlorhexidine 0.12% Liquid 15 milliLiter(s) Oral Mucosa every 12 hours  chlorhexidine 2% Cloths 1 Application(s) Topical <User Schedule>  dexmedetomidine Infusion 0.5 MICROgram(s)/kG/Hr (9.81 mL/Hr) IV Continuous <Continuous>  dextrose 50% Injectable 50 milliLiter(s) IV Push every 15 minutes  heparin  Infusion 400 Unit(s)/Hr (12.5 mL/Hr) IV Continuous <Continuous>  Hydromorphone  Injectable 0.5 milliGRAM(s) IV Push once  insulin lispro (ADMELOG) corrective regimen sliding scale   SubCutaneous every 6 hours  pantoprazole  Injectable 40 milliGRAM(s) IV Push every 12 hours  piperacillin/tazobactam IVPB.. 3.375 Gram(s) IV Intermittent every 8 hours  propofol Infusion 20 MICROgram(s)/kG/Min (9.42 mL/Hr) IV Continuous <Continuous>  sodium chloride 0.9% lock flush 3 milliLiter(s) IV Push every 8 hours  sodium chloride 0.9%. 1000 milliLiter(s) (10 mL/Hr) IV Continuous <Continuous>    MEDICATIONS  (PRN):  acetaminophen    Suspension .. 650 milliGRAM(s) Oral every 6 hours PRN Temp greater or equal to 38C (100.4F)    LABS: All Lab data reviewed and analyzed                         8.2    9.49  )-----------( 236      ( 10 Sep 2021 00:43 )             27.1    09-10    135  |  98  |  14  ----------------------------<  173<H>  4.2   |  27  |  0.44<L>    Ca    9.5      10 Sep 2021 00:43  Phos  3.7     09-10  Mg     1.8     09-10    TPro  7.3  /  Alb  3.1<L>  /  TBili  0.3  /  DBili  x   /  AST  47<H>  /  ALT  171<H>  /  AlkPhos  259<H>  09-10      Ca    9.4      09 Sep 2021 08:34  Phos  2.9     -  Mg     1.7         TPro  7.2  /  Alb  2.9<L>  /  TBili  0.4  /  DBili  x   /  AST  63<H>  /  ALT  194<H>  /  AlkPhos  255<H>      Ca    9.6      08 Sep 2021 00:32  Phos  2.9     -08  Mg     1.7         TPro  7.0  /  Alb  3.0<L>  /  TBili  0.4  /  DBili  x   /  AST  97<H>  /  ALT  230<H>  /  AlkPhos  260<H>                                                                                                                            PTT - ( 2021 04:52 )  PTT:45.2 sec LIVER FUNCTIONS - ( 2021 00:42 )  Alb: 3.4 g/dL / Pro: 6.7 g/dL / ALK PHOS: 213 U/L / ALT: 15 U/L / AST: 24 U/L / GGT: x           RADIOLOGY: - Reviewed and analyzed RT Pig tail cathter  , LVAD HM2, CT scan of abdomen reviewed result noted

## 2021-09-10 NOTE — PROGRESS NOTE ADULT - ASSESSMENT
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 coli UTIs, driveline drainage (1/7/21) and COVID-19, here initially for GIB prolonged hospital course, s/p tracheostomy, acalculous cholecystitis s/p perc dawn. Patient has persistent intermittent abdominal pain, CTA showing possible proximal SMA stenosis. CTA poor quality limited by significant streak artifact. Mesenteric duplex velocities without evidence of significant stenosis, however study unreliable in the setting of patient's augmented systolic function given LVAD. Patient continues to report abdominal pain.    - OR today for mesenteric angiogram  - Appreciate documentation of clearance for OR   - Continue excellent care per CTICU    Vascular Surgery  [id number]

## 2021-09-10 NOTE — PROGRESS NOTE ADULT - PROBLEM SELECTOR PLAN 7
- endocrine recs appreciated  - currently on methimazole, propranolol, and steroids  - Steroid taper as follows:  9/10: Hydrocortisone 50 mg q 12 hrs, stop after 6 doses  9/11, 9/12, 9/13: Hydrocortisone 25 mg q 12 hrs, stop after 6 doses  9/14, 9/15: Hydrocortisone 25 mg daily, stop after 2 doses  Stop steroids thereafter.   - will need to obtain endocrine clearance prior to angiogram/contrast exposure given his history of thyrotoxicosis on this admission - endocrine recs appreciated  - currently on methimazole, propranolol, and steroids  - Steroid taper as follows:  9/10: Hydrocortisone 50 mg q 12 hrs, stop after 6 doses  9/11, 9/12, 9/13: Hydrocortisone 25 mg q 12 hrs, stop after 6 doses  9/14, 9/15: Hydrocortisone 25 mg daily, stop after 2 doses  Stop steroids thereafter.   - Benefits of performing angiogram/contrast exposure outweigh risks. Conitnue treatment for hyperTSH and monitor TFTs post contrast exposure.

## 2021-09-10 NOTE — PROGRESS NOTE ADULT - ASSESSMENT
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 coli UTIs, driveline drainage (1/7/21) and COVID-19 (back in April 2020)  He was recently seen in clinic where he complained of abdominal pain and dark stools w constipation back in May. He presents to [facility] ER today weakness and fatigue, moderate and + Black stools for three days, on coumadin secondary to warfarin use in the setting of an LVAD. Patient has required transfusions for GIB in the past. Mostly recently back in jan 2021 pt had anemia with dark stools. No interventions was done at that time. However Last Endoscopy was done in July 2020 (negative). Today labs show patient is anemic with H/H of 4.5/16.3,. INR is 8.84 MAP in the 90s,   Returned from endoscopy appearing ill tachypneic with chills with new white out of his left lung      afebrile and appears stable  recent sputums with normal cornelia  would favor continued monitoring off antimicrobials unless clinical condition changes  if repeat sputum is negative for MDRO organism can discontinue isolation- discontinued  plans for OR for SMA angiogram and possible stent placement        Morris Prater MD  [phone]  After 5pm/weekends [phone]

## 2021-09-10 NOTE — PROGRESS NOTE ADULT - SUBJECTIVE AND OBJECTIVE BOX
Admitted for:    Following for:    Subjective:       MEDICATIONS  (STANDING):  dextrose 40% Gel 15 Gram(s) Oral once  dextrose 50% Injectable 25 Gram(s) IV Push once  dextrose 50% Injectable 12.5 Gram(s) IV Push once  glucagon  Injectable 1 milliGRAM(s) IntraMuscular once  hydrocortisone sodium succinate Injectable 50 milliGRAM(s) IV Push two times a day  insulin lispro (ADMELOG) corrective regimen sliding scale   SubCutaneous every 6 hours  insulin NPH human recombinant 6 Unit(s) SubCutaneous <User Schedule>  lidocaine   4% Patch 1 Patch Transdermal daily  methimazole 20 milliGRAM(s) Oral <User Schedule>  metoclopramide Injectable 10 milliGRAM(s) IV Push every 8 hours  mirtazapine 7.5 milliGRAM(s) Oral daily  multivitamin 1 Tablet(s) Oral daily  pantoprazole  Injectable 40 milliGRAM(s) IV Push every 12 hours  propranolol 40 milliGRAM(s) Oral every 8 hours  sertraline 100 milliGRAM(s) Oral daily  sodium chloride 0.9%. 1000 milliLiter(s) (10 mL/Hr) IV Continuous <Continuous>  thiamine 100 milliGRAM(s) Oral daily    MEDICATIONS  (PRN):  ondansetron Injectable 4 milliGRAM(s) IV Push every 6 hours PRN Nausea and/or Vomiting  simethicone 80 milliGRAM(s) Chew every 8 hours PRN Gas      Allergies    Amiodarone Hydrochloride (Other (Severe))    Intolerances        Review of Systems:  Constitutional: No fever  Eyes: No blurry vision  Neuro: No tremors  HEENT: No pain  Cardiovascular: No chest pain, palpitations  Respiratory: No SOB, no cough  GI: No nausea, vomiting, abdominal pain  : No dysuria  Skin: no rash  Psych: no depression  Endocrine: no polyuria, polydipsia  Hem/lymph: no swelling  Osteoporosis: no fractures    PHYSICAL EXAM:  VITALS: T(C): 36.5 (09-10-21 @ 08:00)  T(F): 97.7 (09-10-21 @ 08:00), Max: 98.1 (09-10-21 @ 04:00)  HR: 69 (09-10-21 @ 09:00) (65 - 89)  BP: --  RR:  (12 - 39)  SpO2:  (100% - 100%)  Wt(kg): --  GENERAL: NAD  EYES: No proptosis, no lid lag, anicteric  HEENT:  Atraumatic  THYROID: Normal size, no palpable nodules  RESPIRATORY: Clear to auscultation bilaterally  CARDIOVASCULAR: Regular rate and rhythm; No murmurs; no peripheral edema  GI: Soft, nontender, non distended, normal bowel sounds  SKIN: Dry  PSYCH: Alert and oriented x 3, flat affect      A1C with Estimated Average Glucose Result: 5.4 % (08-15-21 @ 13:52)  A1C with Estimated Average Glucose Result: 5.6 % (06-15-21 @ 02:42)      POCT Blood Glucose.: 97 mg/dL (09-10-21 @ 07:49)  POCT Blood Glucose.: 210 mg/dL (09-10-21 @ 06:04)  POCT Blood Glucose.: 74 mg/dL (09-10-21 @ 05:25)  POCT Blood Glucose.: 69 mg/dL (09-10-21 @ 05:23)  POCT Blood Glucose.: 174 mg/dL (09-09-21 @ 23:48)  POCT Blood Glucose.: 114 mg/dL (09-09-21 @ 17:29)  POCT Blood Glucose.: 103 mg/dL (09-09-21 @ 11:11)  POCT Blood Glucose.: 133 mg/dL (09-09-21 @ 06:02)  POCT Blood Glucose.: 125 mg/dL (09-09-21 @ 03:13)  POCT Blood Glucose.: 88 mg/dL (09-09-21 @ 00:49)  POCT Blood Glucose.: 118 mg/dL (09-08-21 @ 17:50)  POCT Blood Glucose.: 108 mg/dL (09-08-21 @ 11:12)  POCT Blood Glucose.: 119 mg/dL (09-08-21 @ 05:28)  POCT Blood Glucose.: 150 mg/dL (09-08-21 @ 00:02)  POCT Blood Glucose.: 112 mg/dL (09-07-21 @ 17:29)  POCT Blood Glucose.: 109 mg/dL (09-07-21 @ 12:59)      09-10    135  |  98  |  14  ----------------------------<  173<H>  4.2   |  27  |  0.44<L>    EGFR if : 139  EGFR if non : 120    Ca    9.5      09-10  Mg     1.8     09-10  Phos  3.7     09-10    TPro  7.3  /  Alb  3.1<L>  /  TBili  0.3  /  DBili  x   /  AST  47<H>  /  ALT  171<H>  /  AlkPhos  259<H>  09-10      Thyroid Function Tests:  09-09 @ 10:34 TSH -- FreeT4 5.0 T3 110 Anti TPO -- Anti Thyroglobulin Ab -- TSI --  09-07 @ 05:43 TSH <0.01 FreeT4 5.3 T3 126 Anti TPO -- Anti Thyroglobulin Ab -- TSI --                         Admitted for: Anemia    Following for: Amiodarone/contrast induced thyrotoxicosis     Subjective: Patient endorsing abdominal pain today, this has been persistent for many days now. He is no longer having vomiting episodes      MEDICATIONS  (STANDING):  dextrose 40% Gel 15 Gram(s) Oral once  dextrose 50% Injectable 25 Gram(s) IV Push once  dextrose 50% Injectable 12.5 Gram(s) IV Push once  glucagon  Injectable 1 milliGRAM(s) IntraMuscular once  hydrocortisone sodium succinate Injectable 50 milliGRAM(s) IV Push two times a day  insulin lispro (ADMELOG) corrective regimen sliding scale   SubCutaneous every 6 hours  insulin NPH human recombinant 6 Unit(s) SubCutaneous <User Schedule>  lidocaine   4% Patch 1 Patch Transdermal daily  methimazole 20 milliGRAM(s) Oral <User Schedule>  metoclopramide Injectable 10 milliGRAM(s) IV Push every 8 hours  mirtazapine 7.5 milliGRAM(s) Oral daily  multivitamin 1 Tablet(s) Oral daily  pantoprazole  Injectable 40 milliGRAM(s) IV Push every 12 hours  propranolol 40 milliGRAM(s) Oral every 8 hours  sertraline 100 milliGRAM(s) Oral daily  sodium chloride 0.9%. 1000 milliLiter(s) (10 mL/Hr) IV Continuous <Continuous>  thiamine 100 milliGRAM(s) Oral daily    MEDICATIONS  (PRN):  ondansetron Injectable 4 milliGRAM(s) IV Push every 6 hours PRN Nausea and/or Vomiting  simethicone 80 milliGRAM(s) Chew every 8 hours PRN Gas      Allergies    Amiodarone Hydrochloride (Other (Severe))    Intolerances    PHYSICAL EXAM:  VITALS: T(C): 36.5 (09-10-21 @ 08:00)  T(F): 97.7 (09-10-21 @ 08:00), Max: 98.1 (09-10-21 @ 04:00)  HR: 69 (09-10-21 @ 09:00) (65 - 89)  BP: --  RR:  (12 - 39)  SpO2:  (100% - 100%)  Wt(kg): --  GENERAL: NAD, NGT in place  RESPIRATORY: Clear to auscultation bilaterally  CARDIOVASCULAR: Regular rate and rhythm; No murmurs; no peripheral edema  GI: Soft, tender to palpation   PSYCH: Alert and oriented x 3, flat affect        A1C with Estimated Average Glucose Result: 5.4 % (08-15-21 @ 13:52)  A1C with Estimated Average Glucose Result: 5.6 % (06-15-21 @ 02:42)      POCT Blood Glucose.: 97 mg/dL (09-10-21 @ 07:49)  POCT Blood Glucose.: 210 mg/dL (09-10-21 @ 06:04)  POCT Blood Glucose.: 74 mg/dL (09-10-21 @ 05:25)  POCT Blood Glucose.: 69 mg/dL (09-10-21 @ 05:23)  POCT Blood Glucose.: 174 mg/dL (09-09-21 @ 23:48)  POCT Blood Glucose.: 114 mg/dL (09-09-21 @ 17:29)  POCT Blood Glucose.: 103 mg/dL (09-09-21 @ 11:11)  POCT Blood Glucose.: 133 mg/dL (09-09-21 @ 06:02)  POCT Blood Glucose.: 125 mg/dL (09-09-21 @ 03:13)  POCT Blood Glucose.: 88 mg/dL (09-09-21 @ 00:49)  POCT Blood Glucose.: 118 mg/dL (09-08-21 @ 17:50)  POCT Blood Glucose.: 108 mg/dL (09-08-21 @ 11:12)  POCT Blood Glucose.: 119 mg/dL (09-08-21 @ 05:28)  POCT Blood Glucose.: 150 mg/dL (09-08-21 @ 00:02)  POCT Blood Glucose.: 112 mg/dL (09-07-21 @ 17:29)  POCT Blood Glucose.: 109 mg/dL (09-07-21 @ 12:59)      09-10    135  |  98  |  14  ----------------------------<  173<H>  4.2   |  27  |  0.44<L>    EGFR if : 139  EGFR if non : 120    Ca    9.5      09-10  Mg     1.8     09-10  Phos  3.7     09-10    TPro  7.3  /  Alb  3.1<L>  /  TBili  0.3  /  DBili  x   /  AST  47<H>  /  ALT  171<H>  /  AlkPhos  259<H>  09-10      Thyroid Function Tests:  09-09 @ 10:34 TSH -- FreeT4 5.0 T3 110 Anti TPO -- Anti Thyroglobulin Ab -- TSI --  09-07 @ 05:43 TSH <0.01 FreeT4 5.3 T3 126 Anti TPO -- Anti Thyroglobulin Ab -- TSI --

## 2021-09-10 NOTE — PROGRESS NOTE ADULT - ATTENDING COMMENTS
Agree with assessment and plan as above by Dr. Aceves. Reviewed all pertinent labs, glucose values, and imaging studies. Modifications made as indicated above. Decrease NPH given lower steroid doses and decreasing insulin requirements. Monitor on current dose of methimazole and trend TFTs.    Billy Tipton D.O  [phone]

## 2021-09-10 NOTE — PROGRESS NOTE ADULT - ASSESSMENT
Assessment and Recommendation:   · Assessment	  Assessment and recommendation :  Recurrent Acute hypoxic respiratory Failure S/P tracheostomy on trach. collar  50% FI02,   Acute blood loss anemia S/P multiple  blood transfusion   going for mesenteric angiogram  S/P septic shock off vasopressin , levophed and off  Dobutamine   S/P cholecystostomy tube placement by IR    AF RVR back to regular sinus Rhythm   Non ischemic cardiomyopathy continue ACE inhibitor and B-Blockers   mesenteric ischemia failure to stent SMA   S/P Septic shock and cardiogenic shock   Stage D systolic heart failure S/P LVAD HM2   MH2 LVAD  with  TV Annuloplasty  Severe peripheral vascular disease   severe hyperglycemia on insulin coverage    Reglan 10 mg for Gastric Motility   hyperthyroidism on Methimazole 10mg TID   critical care polyneuropathy   Anemia of Acute blood Loss   severe protein caloric malnutrition   S/P blood and FFP transfusion   Chronic kidney disease stage III  Depressed and withdrawn   resume  NGT feeding on Vital  advanced to 55 cc/ hr   Decannulation ?  GI prophylaxis with PPI

## 2021-09-10 NOTE — PRE-ANESTHESIA EVALUATION ADULT - NSANTHPMHFT_GEN_ALL_CORE
66yo man with NICM s/p HM2 LVAD, TV annuloplasty '17 as destination therapy, SIADH, depression, CKD, COVID-19 (April 2020), admitted with melena and anemia.
Chart reviewed  H&P - 64 yo M.  PMHX - ACC/AHA stage D HF due to NICM HM2 LVAD , TV annuloplasty ring 9/12/17 as destination therapy due to severe peripheral artery disease with significant stenosis, SIADH hx, Depression, CKD-3 with hyperkalemia, past E. coli UTIs, driveline drainage (1/7/21) and COVID-19 (back in April 2020)   ADMISSION -  He was recently seen in clinic where he complained of abdominal pain and dark stools w constipation back in May. Presented to ER w/ weakness and fatigue, moderate and + Black stools for three days, on coumadin secondary to warfarin use in the setting of an LVAD. Patient has required transfusions for GIB in the past. Labs show patient is anemic with H/H of 4.5/16.3,. INR is 8.84 MAP in the 90s,    Surgery/Hospital Course:  6/14 admit for melena w/ anemia, INR 8.84   6/15 Capsul study (+) for small bowel bleed, balloon endoscopy (old blood in prox ileum); post EGD - septic w/ L opacity, re-intubated for concern for aspiration, TTE (Mod MR, decrease biV w/ interventricular septum boweing towards R)  6/22 CT C/A/P: Fluid filled colon which may be 2/2 rapid transit. Small bilateral pleffs with associates. Compressive atelectasis New ISABELLE & LLL  parenchymal opacities, suspicious for pneumonia. 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   6/25 #8 Shiley trach at bedside   7/1 LVAD speed increased to 9200  8/13 CTA A/P ordered to r/o mesenteric ischemia 2/2 persistent anorexia, nausea, vomiting. Revealed:  Evaluation of the mesenteric vessels is limited by streak artifact from LVAD. There appears to be severe stenosis of the proximal SMA; abdominal mesenteric doppler is recommended for further evaluation. 2.  No small bowel findings to suggest acute mesenteric ischemia. 3.  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  8/15: Cultures resulted BC 1/2 +GPC in clusters, SC enterobacter; mesenteric duplex: borderline stenosis of proximal SMA  8/27: CT C/A/P noncon: Nondular opacities in R lung apex w/cavitation, abd nl  8/31:  Continue current care, treatment of thyrotoxicosis with medications as per endocrine, d/c ABX as per team.     Currently in CTU Trached, nabil, PIV in place.  No vasoactive drips  Biliary drain  LVAD

## 2021-09-10 NOTE — PROGRESS NOTE ADULT - SUBJECTIVE AND OBJECTIVE BOX
SURGERY PROGRESS NOTE    SUBJECTIVE / 24H EVENTS:  Patient seen and examined on morning rounds. No acute events overnight.      OBJECTIVE:  VITAL SIGNS:  T(C): 36.5 (09-10-21 @ 08:00), Max: 36.7 (09-10-21 @ 00:00)  HR: 78 (09-10-21 @ 13:30) (65 - 86)  BP: --  RR: 26 (09-10-21 @ 13:30) (12 - 39)  SpO2: 100% (09-10-21 @ 13:30) (100% - 100%)  Daily Height in cm: 177.8 (10 Sep 2021 13:30)    Daily Weight in k.7 (10 Sep 2021 00:00)  POCT Blood Glucose.: 87 mg/dL (09-10-21 @ 13:03)  POCT Blood Glucose.: 91 mg/dL (09-10-21 @ 11:33)  POCT Blood Glucose.: 97 mg/dL (09-10-21 @ 07:49)      PHYSICAL EXAM:  Gen: NAD  LS: Respirations unlabored on RA  GI: Soft. Nondistended. Tender to palpation in bilateral lower quadrants > upper quadrants. No rebound tenderness or guarding  Ext: Warm, well perfused        21 @ 07:01  -  09-10-21 @ 07:00  --------------------------------------------------------  IN:    Enteral Tube Flush: 200 mL    Miscellaneous Tube Feedin mL  Total IN: 1080 mL    OUT:    Drain (mL): 495 mL    Voided (mL): 850 mL  Total OUT: 1345 mL    Total NET: -265 mL      09-10-21 @ 07:01  -  09-10-21 @ 15:51  --------------------------------------------------------  IN:  Total IN: 0 mL    OUT:    Drain (mL): 100 mL    Miscellaneous Tube Feedin mL    Stool (mL): 1 mL    Stool (mL): 1 mL    Voided (mL): 100 mL    Voided (mL): 100 mL  Total OUT: 302 mL    Total NET: -302 mL          LAB VALUES:  09-10    135  |  98  |  14  ----------------------------<  173<H>  4.2   |  27  |  0.44<L>    Ca    9.5      10 Sep 2021 00:43  Phos  3.7     09-10  Mg     1.8     09-10    TPro  7.3  /  Alb  3.1<L>  /  TBili  0.3  /  DBili  x   /  AST  47<H>  /  ALT  171<H>  /  AlkPhos  259<H>  09-10                               8.2    9.49  )-----------( 236      ( 10 Sep 2021 00:43 )             27.1     LIVER FUNCTIONS - ( 10 Sep 2021 00:43 )  Alb: 3.1 g/dL / Pro: 7.3 g/dL / ALK PHOS: 259 U/L / ALT: 171 U/L / AST: 47 U/L / GGT: x           PT/INR - ( 10 Sep 2021 00:43 )   PT: 12.7 sec;   INR: 1.06 ratio         PTT - ( 10 Sep 2021 00:43 )  PTT:30.7 sec  ABG - ( 10 Sep 2021 15:07 )  pH, Arterial: 7.43  pH, Blood: x     /  pCO2: 46    /  pO2: 331   / HCO3: 30    / Base Excess: 5.6   /  SaO2: 100.0                     MICROBIOLOGY:    Culture - Sputum (collected 08 Sep 2021 14:58)  Source: .Sputum Sputum  Gram Stain (08 Sep 2021 17:34):    Few Squamous epithelial cells per low power field    Few polymorphonuclear leukocytes per low power field    Rare Gram positive cocci in pairs per oil power field  Preliminary Report (09 Sep 2021 15:11):    Normal Respiratory Bria present    Culture - Sputum (collected 07 Sep 2021 18:32)  Source: .Sputum Sputum  Gram Stain (07 Sep 2021 21:06):    Numerous polymorphonuclear leukocytes per low power field    Rare Squamous epithelial cells per low power field    Rare Gram positive cocci in pairs per oil power field    Rare Gram Variable Rods per oil power field  Final Report (09 Sep 2021 18:01):    Normal Respiratory Bria present        RADIOLOGY:        MEDICATIONS  (STANDING):    MEDICATIONS  (PRN):

## 2021-09-10 NOTE — PROGRESS NOTE ADULT - SUBJECTIVE AND OBJECTIVE BOX
RICKY HAMPTON  MRN-[id number]  Patient is a 66y old  Male who presents with a chief complaint of Anemia, Supratherapeutic INR, Dark Stools (09 Sep 2021 18:33)    HPI:  65M PMH ACC/AHA stage D HF due to NICM HM2 LVAD , TV annuloplasty ring 17 as destination therapy due to severe peripheral artery disease with significant stenosis  SIADH, Depression, CKD-3 with hyperkalemia, past E. coli UTIs, driveline drainage (21) and COVID-19 (back in 2020)  He was recently seen in clinic where he complained of abdominal pain and dark stools w constipation back in May. He presents to [facility] ER today weakness and fatigue, moderate and + Black stools for three days, on coumadin secondary to warfarin use in the setting of an LVAD. Patient has required transfusions for GIB in the past. Mostly recently back in 2021 pt had anemia with dark stools. No interventions was done at that time. However Last Endoscopy was done in 2020 (negative). Today labs show patient is anemic with H/H of 4.5/16.3,. INR is 8.84 MAP in the 90s, Temp 35.1. He denies any chest pain, shortness of breath, dizziness, abd pain, nausea or vomiting.       (2021 16:57)      Surgery/Hospital Course:   admit for melena w/ anemia, INR 8.84   6/15 Capsul study (+) for small bowel bleed, balloon endoscopy (old blood in prox ileum); post EGD - septic w/ L opacity, re-intubated for concern for aspiration, TTE (Mod MR, decrease biV w/ interventricular septum boweing towards R)   bronch    +C Diff    CT C/A/P: Fluid filled colon which may be 2/2 rapid transit. Small bilateral pleffs with associates. Compressive atelectasis New ISABELLE & LLL  parenchymal opacities, suspicious for pneumonia. 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    #8 Shiley trach at bedside    LVAD speed increased to 9200   Bronch   TC since . Patient transferred to SDU.    INR today 2.64.  H&H 7.3/24 this AM.  Will repeat CBC at noon, and will send stool guaiac Patient with persistent abdominal tenderness, rate of tube feeds decreased.  No nausea/vomiting.     INR today 2.4. H&H 9.1/28.6 low flow overnight /N&V, refusing Tube feeds on D5 1/2 normal  @50 cc/hr   INR 2.69  H&H 7.7/.1 refusing Tube feeds on D5 1/2 normal  @50 cc/hr. This am + BM Melena Dr Oneill HF  aware- PRBC x1  GI team consulted -  NPO  plan on study in am-  D/w Dr Cadet Patient  to return to CTU for further management; 1PRBCS    Post op INR 2.2 today.  No bleeding. BC + for SM.  Pt is hypotensive requiring pressor and inotropic support.  ID follow up today on Cefepime will follow.   R PTC for PTX    CT C/A/P: sub q emphysema in R chest wall, GGO RUL, small ascites CTH negative; Abd US: GB thickening, pericholecystic fluid     Perchole drain in place continues to drain total output overnight 133.  Fever today 38.8    duplex LE negative    Patient with persistent abdominal pain, refusing tube feeds and medications, Psych consulted   CTA A/P ordered to r/o mesenteric ischemia 2/2 persistent anorexia, nausea, vomiting. Revealed:  Evaluation of the mesenteric vessels is limited by streak artifact from LVAD. There appears to be severe stenosis of the proximal SMA; abdominal mesenteric doppler is recommended for further evaluation. 2.  No small bowel findings to suggest acute mesenteric ischemia. 3.  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  8/15: Cultures resulted BC 1/2 +GPC in clusters, SC enterobacter; mesenteric duplex: borderline stenosis of proximal SMA  : CT C/A/P noncon: Nondular opacities in R lung apex w/cavitation, abd nl  :  Continue current care, treatment of thyrotoxicosis with medications as per endocrine, d/c ABX as per team.     Today:  Planned for mesenteric angiogram.    REVIEW OF SYSTEMS:  Patient speaks over trach   Gen: No fever, leg pain improved  EYES/ENT: No visual changes;  No vertigo or throat pain   NECK: No pain   RES:  No shortness of breath or Cough  CARD: No chest pain   GI: +abdominal pain, denies nausea at this time  : No dysuria  NEURO: No weakness; + lower extremity pain, improving  SKIN: No itching, rashes       ICU Vital Signs Last 24 Hrs  T(C): 36.7 (10 Sep 2021 04:00), Max: 36.7 (10 Sep 2021 00:00)  T(F): 98.1 (10 Sep 2021 04:00), Max: 98.1 (10 Sep 2021 04:00)  HR: 76 (10 Sep 2021 07:) (65 - 89)  BP: --  BP(mean): --  ABP: 90/69 (10 Sep 2021 07:00) (58/58 - 120/85)  ABP(mean): 78 (10 Sep 2021 07:) (58 - 98)  RR: 30 (10 Sep 2021 07:) (12 - 39)  SpO2: 100% (10 Sep 2021 07:) (100% - 100%)      Physical Exam:  Gen:  Awake and alert, Sitting in chair in NAD.  Psych: Mood greatly improved but waxes and wanes, more interactive and participating in care. Would like to be more included in bedside rounding.  CNS:  intact, nonfocal, responds to all commands  Neck: no JVD, +TC   RES : course breath sounds, no wheezing, moderate tan secretions able to cough most up on own         CVS: +LVAD hum   Abd: Soft, RUQ tenderness by perc sybil site. Sybil drain with bilious fluid. Positive BS throughout.  Skin: No rash  Ext:  no edema    ============================I/O===========================   I&O's Detail    09 Sep 2021 07:01  -  10 Sep 2021 07:00  --------------------------------------------------------  IN:    Enteral Tube Flush: 200 mL    Miscellaneous Tube Feedin mL  Total IN: 1080 mL    OUT:    Drain (mL): 495 mL    Voided (mL): 850 mL  Total OUT: 1345 mL    Total NET: -265 mL        ============================ LABS =========================                        8.2    9.49  )-----------( 236      ( 10 Sep 2021 00:43 )             27.1     09-10    135  |  98  |  14  ----------------------------<  173<H>  4.2   |  27  |  0.44<L>    Ca    9.5      10 Sep 2021 00:43  Phos  3.7     09-10  Mg     1.8     09-10    TPro  7.3  /  Alb  3.1<L>  /  TBili  0.3  /  DBili  x   /  AST  47<H>  /  ALT  171<H>  /  AlkPhos  259<H>  09-10    LIVER FUNCTIONS - ( 10 Sep 2021 00:43 )  Alb: 3.1 g/dL / Pro: 7.3 g/dL / ALK PHOS: 259 U/L / ALT: 171 U/L / AST: 47 U/L / GGT: x           PT/INR - ( 10 Sep 2021 00:43 )   PT: 12.7 sec;   INR: 1.06 ratio         PTT - ( 10 Sep 2021 00:43 )  PTT:30.7 sec  ABG - ( 09 Sep 2021 23:56 )  pH, Arterial: 7.38  pH, Blood: x     /  pCO2: 56    /  pO2: 127   / HCO3: 33    / Base Excess: 6.9   /  SaO2: 100.0               ======================Micro/Rad/Cardio=================  Culture: Reviewed   CXR: Reviewed  Echo:Reviewed  ======================================================  PAST MEDICAL & SURGICAL HISTORY:  CHF (congestive heart failure)    CAD (coronary artery disease)    Depression    Pleural effusion    History of 2019 novel coronavirus disease (COVID-19)  2020    Hemorrhoids    Bleeding hemorrhoids    Peripheral arterial disease    Claudication    BPH with urinary obstruction    ACC/AHA stage D systolic heart failure    Anticoagulation goal of INR 2.0 to 2.5    Falls    Clavicle fracture    CKD (chronic kidney disease), stage III    Iron deficiency anemia    H/O epistaxis    Vertigo    GI bleed    S/P TVR (tricuspid valve repair)    S/P ventricular assist device    S/P endoscopy      ====================ASSESSMENT ==============  Stage D Nonischemic Cardiomyopathy, Status Post HM2 on 2017    Cardiogenic shock  Hemodynamic instability   Acute hypoxemic respiratory failure s/p trach   GI bleed , Status Post Enteroscopy   Anemia, in setting of melena   Chronic Kidney Disease  Stress hyperglycemia   C.diff positive on    Hypovolemic shock  Septic shock  Leukocytosis  GB thickening/percholecystic s/p perc choley by IT   SMA stenosis  Serratia/citrobacter pneumonia   Stenotrophomonas pneumonia   Enterobacter pneumonia   Nasuea/vomiting  Deconditioning    Plan:  ====================== NEUROLOGY=====================  Nonfocal, continue to monitor neuro status   C/w mirtazapine and sertraline for depression  Deconditioned, PT/Ambulate as tolerated    mirtazapine 7.5 milliGRAM(s) Oral daily  sertraline 100 milliGRAM(s) Oral daily  ==================== RESPIRATORY======================  Acute hypoxemic respiratory failure s/p #8 shiley trach on , continue TC as tolerated (TC since )  Continue close monitoring of respiratory rate and breathing pattern, following of ABG’s with A-line monitoring, continuous pulse oximetry monitoring.   Moderate secretions, continue suctioning prn, pulmonary toileting.   ====================CARDIOVASCULAR==================  Stage D Nonischemic Cardiomyopathy, Status Post HM2 on 2017; LVAD settings 9200, flow 4.7   TTE : reveals at least moderate MR, mild AI, severe global LV syst dysfxn, dec RV fxn   Propranolol for rate control of HR 2/2 Hyperthyroidism, titrate as tolerated per Endo  Continue invasive hemodynamic monitoring     propranolol 40 milliGRAM(s) Oral every 8 hours  ===================HEMATOLOGIC/ONC ===================  Acute blood loss anemia, monitor H&H/Plts    Holding coumadin and ASA given recurrent GI bleeding. Continue to monitor LDH daily.  ===================== RENAL =========================  Acute blood loss anemia, monitor H&H/Plts    Holding coumadin and ASA given recurrent GI bleeding. Continue to monitor LDH daily.      ==================== GASTROINTESTINAL===================  S/p cholecystostomy tube placed on  with IR   CTA A/P : Evaluation of the mesenteric vessels is limited by streak artifact from LVAD. There appears to be severe stenosis of the proximal SMA; abdominal mesenteric doppler is recommended for further evaluation. 2.  No small bowel findings to suggest acute mesenteric ischemia. 3.  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  Mesenteric duplex on 8/15 borderline stenosis of proximal SMA. Mesenteric angiogram tentatively scheduled for next week.   Pending RUQ US results to reassess gallbladder with perc sybil tube  Vital 1.5 shantelle feeds - Continue increasing tube feeds 5cc/day for goal of 65cc/hr as tolerated  CT Abd/Pel on  without acute process   Transaminitis   Reglan for gut motility  Zofran PRN nausea/vomiting   Continue Protonix for stress ulcer prophylaxis.     multivitamin 1 Tablet(s) Oral daily  pantoprazole  Injectable 40 milliGRAM(s) IV Push every 12 hours  simethicone 80 milliGRAM(s) Chew every 8 hours PRN Gas  sodium chloride 0.9%. 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     dextrose 40% Gel 15 Gram(s) Oral once  dextrose 50% Injectable 25 Gram(s) IV Push once  dextrose 50% Injectable 12.5 Gram(s) IV Push once  glucagon  Injectable 1 milliGRAM(s) IntraMuscular once  hydrocortisone sodium succinate Injectable 50 milliGRAM(s) IV Push two times a day  insulin lispro (ADMELOG) corrective regimen sliding scale   SubCutaneous every 6 hours  insulin NPH human recombinant 6 Unit(s) SubCutaneous <User Schedule>  methimazole 20 milliGRAM(s) Oral <User Schedule>  ========================INFECTIOUS DISEASE================  Afebrile, WBC within normal limits  Continue trending WBC and monitoring fever curve   Culture from sybil drain on : NGTD  SCx on  +rare gram positive cocci and gram variable rods  Repeat SCx yesterday +GPC in pairs, will f/u to get patient off isolation   ID following, MD Prater, appreciate recommendations      Patient requires continuous monitoring with bedside rhythm monitoring, pulse ox monitoring, and intermittent blood gas analysis. Care plan discussed with ICU care team. Patient remained critical and at risk for life threatening decompensation.     By signing my name below, I, Emily Roa, attest that this documentation has been prepared under the direction and in the presence of CLAUDIA Alfaro   Electronically signed: Emily Roa Scribe, 09-10-21 @ 08:02    I, CLAUDIA Alfaro , personally performed the services described in this documentation. all medical record entries made by the luisibmel were at my direction and in my presence. I have reviewed the chart and agree that the record reflects my personal performance and is accurate and complete  Electronically signed: CLAUDIA Alfaro        RICKY HAMPTON  MRN-[id number]  Patient is a 66y old  Male who presents with a chief complaint of Anemia, Supratherapeutic INR, Dark Stools (09 Sep 2021 18:33)    HPI:  65M PMH ACC/AHA stage D HF due to NICM HM2 LVAD , TV annuloplasty ring 17 as destination therapy due to severe peripheral artery disease with significant stenosis  SIADH, Depression, CKD-3 with hyperkalemia, past E. coli UTIs, driveline drainage (21) and COVID-19 (back in 2020)  He was recently seen in clinic where he complained of abdominal pain and dark stools w constipation back in May. He presents to [facility] ER today weakness and fatigue, moderate and + Black stools for three days, on coumadin secondary to warfarin use in the setting of an LVAD. Patient has required transfusions for GIB in the past. Mostly recently back in 2021 pt had anemia with dark stools. No interventions was done at that time. However Last Endoscopy was done in 2020 (negative). Today labs show patient is anemic with H/H of 4.5/16.3,. INR is 8.84 MAP in the 90s, Temp 35.1. He denies any chest pain, shortness of breath, dizziness, abd pain, nausea or vomiting.       (2021 16:57)      Surgery/Hospital Course:   admit for melena w/ anemia, INR 8.84   6/15 Capsul study (+) for small bowel bleed, balloon endoscopy (old blood in prox ileum); post EGD - septic w/ L opacity, re-intubated for concern for aspiration, TTE (Mod MR, decrease biV w/ interventricular septum boweing towards R)   bronch    +C Diff    CT C/A/P: Fluid filled colon which may be 2/2 rapid transit. Small bilateral pleffs with associates. Compressive atelectasis New ISABELLE & LLL  parenchymal opacities, suspicious for pneumonia. 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    #8 Shiley trach at bedside    LVAD speed increased to 9200   Bronch   TC since . Patient transferred to SDU.    INR today 2.64.  H&H 7.3/24 this AM.  Will repeat CBC at noon, and will send stool guaiac Patient with persistent abdominal tenderness, rate of tube feeds decreased.  No nausea/vomiting.     INR today 2.4. H&H 9.1/28.6 low flow overnight /N&V, refusing Tube feeds on D5 1/2 normal  @50 cc/hr   INR 2.69  H&H 7.7/.1 refusing Tube feeds on D5 1/2 normal  @50 cc/hr. This am + BM Melena Dr Oneill HF  aware- PRBC x1  GI team consulted -  NPO  plan on study in am-  D/w Dr Cadet Patient  to return to CTU for further management; 1PRBCS    Post op INR 2.2 today.  No bleeding. BC + for SM.  Pt is hypotensive requiring pressor and inotropic support.  ID follow up today on Cefepime will follow.   R PTC for PTX    CT C/A/P: sub q emphysema in R chest wall, GGO RUL, small ascites CTH negative; Abd US: GB thickening, pericholecystic fluid     Perchole drain in place continues to drain total output overnight 133.  Fever today 38.8    duplex LE negative    Patient with persistent abdominal pain, refusing tube feeds and medications, Psych consulted   CTA A/P ordered to r/o mesenteric ischemia 2/2 persistent anorexia, nausea, vomiting. Revealed:  Evaluation of the mesenteric vessels is limited by streak artifact from LVAD. There appears to be severe stenosis of the proximal SMA; abdominal mesenteric doppler is recommended for further evaluation. 2.  No small bowel findings to suggest acute mesenteric ischemia. 3.  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  8/15: Cultures resulted BC 1/2 +GPC in clusters, SC enterobacter; mesenteric duplex: borderline stenosis of proximal SMA  : CT C/A/P noncon: Nondular opacities in R lung apex w/cavitation, abd nl  :  Continue current care, treatment of thyrotoxicosis with medications as per endocrine, d/c ABX as per team.     Today:  Planned for mesenteric angiogram. NPO p MN, was tolerating TF to 55 yesterday. Still c/o mild abdominal pain    REVIEW OF SYSTEMS:  Patient speaks over trach   Gen: No fever, leg pain improved  EYES/ENT: No visual changes;  No vertigo or throat pain   NECK: No pain   RES:  No shortness of breath or Cough  CARD: No chest pain   GI: +abdominal pain, denies nausea at this time  : No dysuria  NEURO: No weakness; + lower extremity pain, improving  SKIN: No itching, rashes       ICU Vital Signs Last 24 Hrs  T(C): 36.7 (10 Sep 2021 04:00), Max: 36.7 (10 Sep 2021 00:00)  T(F): 98.1 (10 Sep 2021 04:00), Max: 98.1 (10 Sep 2021 04:00)  HR: 76 (10 Sep 2021 07:) (65 - 89)  BP: --  BP(mean): --  ABP: 90/69 (10 Sep 2021 07:00) (58/58 - 120/85)  ABP(mean): 78 (10 Sep 2021 07:) (58 - 98)  RR: 30 (10 Sep 2021 07:) (12 - 39)  SpO2: 100% (10 Sep 2021 07:) (100% - 100%)      Physical Exam:  Gen:  Awake and alert, Sitting in chair in NAD.  Psych: Mood greatly improved but waxes and wanes, more interactive and participating in care. Would like to be more included in bedside rounding.  CNS:  intact, nonfocal, responds to all commands  Neck: no JVD, +TC   RES : course breath sounds, no wheezing, moderate tan secretions able to cough most up on own         CVS: +LVAD hum   Abd: Soft, RUQ tenderness by perc sybil site. Sybil drain with bilious fluid. Positive BS throughout.  Skin: No rash  Ext:  no edema    ============================I/O===========================   I&O's Detail    09 Sep 2021 07:01  -  10 Sep 2021 07:00  --------------------------------------------------------  IN:    Enteral Tube Flush: 200 mL    Miscellaneous Tube Feedin mL  Total IN: 1080 mL    OUT:    Drain (mL): 495 mL    Voided (mL): 850 mL  Total OUT: 1345 mL    Total NET: -265 mL        ============================ LABS =========================                        8.2    9.49  )-----------( 236      ( 10 Sep 2021 00:43 )             27.1     -10    135  |  98  |  14  ----------------------------<  173<H>  4.2   |  27  |  0.44<L>    Ca    9.5      10 Sep 2021 00:43  Phos  3.7     09-10  Mg     1.8     09-10    TPro  7.3  /  Alb  3.1<L>  /  TBili  0.3  /  DBili  x   /  AST  47<H>  /  ALT  171<H>  /  AlkPhos  259<H>  09-10    LIVER FUNCTIONS - ( 10 Sep 2021 00:43 )  Alb: 3.1 g/dL / Pro: 7.3 g/dL / ALK PHOS: 259 U/L / ALT: 171 U/L / AST: 47 U/L / GGT: x           PT/INR - ( 10 Sep 2021 00:43 )   PT: 12.7 sec;   INR: 1.06 ratio         PTT - ( 10 Sep 2021 00:43 )  PTT:30.7 sec  ABG - ( 09 Sep 2021 23:56 )  pH, Arterial: 7.38  pH, Blood: x     /  pCO2: 56    /  pO2: 127   / HCO3: 33    / Base Excess: 6.9   /  SaO2: 100.0               ======================Micro/Rad/Cardio=================  Culture: Reviewed   CXR: Reviewed  Echo:Reviewed  ======================================================  PAST MEDICAL & SURGICAL HISTORY:  CHF (congestive heart failure)    CAD (coronary artery disease)    Depression    Pleural effusion    History of 2019 novel coronavirus disease (COVID-19)  2020    Hemorrhoids    Bleeding hemorrhoids    Peripheral arterial disease    Claudication    BPH with urinary obstruction    ACC/AHA stage D systolic heart failure    Anticoagulation goal of INR 2.0 to 2.5    Falls    Clavicle fracture    CKD (chronic kidney disease), stage III    Iron deficiency anemia    H/O epistaxis    Vertigo    GI bleed    S/P TVR (tricuspid valve repair)    S/P ventricular assist device    S/P endoscopy      ====================ASSESSMENT ==============  Stage D Nonischemic Cardiomyopathy, Status Post HM2 on 2017    Cardiogenic shock  Hemodynamic instability   Acute hypoxemic respiratory failure s/p trach   GI bleed , Status Post Enteroscopy   Anemia, in setting of melena   Chronic Kidney Disease  Stress hyperglycemia   C.diff positive on    Hypovolemic shock  Septic shock  Leukocytosis  GB thickening/percholecystic s/p perc choley by IT   SMA stenosis  Serratia/citrobacter pneumonia   Stenotrophomonas pneumonia   Enterobacter pneumonia   Nasuea/vomiting  Deconditioning    Plan:  ====================== NEUROLOGY=====================  Nonfocal, continue to monitor neuro status   C/w mirtazapine and sertraline for depression  Deconditioned, PT/Ambulate as tolerated    mirtazapine 7.5 milliGRAM(s) Oral daily  sertraline 100 milliGRAM(s) Oral daily  ==================== RESPIRATORY======================  Acute hypoxemic respiratory failure s/p #8 shiley trach on , continue TC as tolerated (TC since )  Continue close monitoring of respiratory rate and breathing pattern, following of ABG’s with A-line monitoring, continuous pulse oximetry monitoring.   Moderate secretions, continue suctioning prn, pulmonary toileting.   Will consult SLP for a PMV eval  ====================CARDIOVASCULAR==================  Stage D Nonischemic Cardiomyopathy, Status Post HM2 on 2017; LVAD settings 9200, flow 4.7   TTE : reveals at least moderate MR, mild AI, severe global LV syst dysfxn, dec RV fxn   Propranolol for rate control of HR 2/2 Hyperthyroidism, titrate as tolerated per Endo  Continue invasive hemodynamic monitoring     propranolol 40 milliGRAM(s) Oral every 8 hours  ===================HEMATOLOGIC/ONC ===================  Acute blood loss anemia, monitor H&H/Plts    Holding coumadin and ASA given recurrent GI bleeding. Continue to monitor LDH daily.  ===================== RENAL =========================  Acute blood loss anemia, monitor H&H/Plts    Holding coumadin and ASA given recurrent GI bleeding. Continue to monitor LDH daily.      ==================== GASTROINTESTINAL===================  S/p cholecystostomy tube placed on  with IR   CTA A/P : Evaluation of the mesenteric vessels is limited by streak artifact from LVAD. There appears to be severe stenosis of the proximal SMA; abdominal mesenteric doppler is recommended for further evaluation. 2.  No small bowel findings to suggest acute mesenteric ischemia. 3.  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  Mesenteric duplex on 8/15 borderline stenosis of proximal SMA. Mesenteric angiogram tentatively scheduled for next week.   Pending RUQ US results to reassess gallbladder with perc sybil tube  Vital 1.5 shantelle feeds - Continue increasing tube feeds 5cc/day for goal of 65cc/hr as tolerated  CT Abd/Pel on  without acute process   Transaminitis   Reglan for gut motility  Zofran PRN nausea/vomiting   Continue Protonix for stress ulcer prophylaxis.     multivitamin 1 Tablet(s) Oral daily  pantoprazole  Injectable 40 milliGRAM(s) IV Push every 12 hours  simethicone 80 milliGRAM(s) Chew every 8 hours PRN Gas  sodium chloride 0.9%. 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     dextrose 40% Gel 15 Gram(s) Oral once  dextrose 50% Injectable 25 Gram(s) IV Push once  dextrose 50% Injectable 12.5 Gram(s) IV Push once  glucagon  Injectable 1 milliGRAM(s) IntraMuscular once  hydrocortisone sodium succinate Injectable 50 milliGRAM(s) IV Push two times a day  insulin lispro (ADMELOG) corrective regimen sliding scale   SubCutaneous every 6 hours  insulin NPH human recombinant 6 Unit(s) SubCutaneous <User Schedule>  methimazole 20 milliGRAM(s) Oral <User Schedule>  ========================INFECTIOUS DISEASE================  Afebrile, WBC within normal limits  Continue trending WBC and monitoring fever curve   Culture from sybil drain on : NGTD  SCx on  +rare gram positive cocci and gram variable rods  Repeat SCx yesterday +GPC in pairs, will f/u to get patient off isolation   ID following, MD Prater, appreciate recommendations      Patient requires continuous monitoring with bedside rhythm monitoring, pulse ox monitoring, and intermittent blood gas analysis. Care plan discussed with ICU care team. Patient remained critical and at risk for life threatening decompensation.     By signing my name below, I, Emily Roa, attest that this documentation has been prepared under the direction and in the presence of CLAUDIA Alfaro   Electronically signed: Emily Roa Scribe, 09-10-21 @ 08:02    I, CLAUDIA Alfrao , personally performed the services described in this documentation. all medical record entries made by the scribe were at my direction and in my presence. I have reviewed the chart and agree that the record reflects my personal performance and is accurate and complete  Electronically signed: CLAUDIA Alfaro

## 2021-09-10 NOTE — PROGRESS NOTE ADULT - ASSESSMENT
65 year old male with history of Stage D HF due to NICM on LVAD, TV annuloplasty ring 9/12/17 as destination therapy due to severe peripheral artery disease with significant stenosis  SIADH, Depression, CKD-3 with hyperkalemia, admitted 6/14/21 with symptomatic anemia with Hgb 4.5 in setting of INR 8.8, thereafter with complicated hospital course including VAP, serratia bacteremia with acalculous cholecystitis s/p percutaneous tube, abdominal pain with unclear source other than possible mesenteric ischemia (from possible SMA stenosis? 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 Nearly two months later, on 8/7 - TSH was < 0.01  Likely 2/2 amiodarone use, exacerbated by IV contrast imaging (7/28 and 8/13). Toxic nodule is possible. Less likely Graves (negative TSH receptor Ab and TSI, but TPO Ab positive at 49.9).   Most likely Type 1 AIT given abrupt presentation after Amiodarone use and positive TPO Ab    Labs from 9/9 - improving  FT4 5.0 (from 5.3)  TT3 110 (from 126 and 165)    Recs  -Ideally, contrast imaging should be deferred until the patient is biochemically euthyroid as further iodine load can exacerbate thyrotoxicosis. However, if contrast imaging is required, risks of deferring the imaging need to be weighed against the risks of further contrast load in this setting.   -Recheck only free T4 and total T3 on 9/13/21  -Continue Methimazole to 20 mg daily.   -Alk phos, AST and ALT are elevated and stable range, but etiologies can be multifactorial. Watch for sudden changes in LFTs or biliary labs  -Continue Propranolol 40 mg q 8 hrs. Goal HR 60-80. At goal   -Continues teroid taper as follows;   1. 9/8, 9/9, 9/10: Hydrocortisone 50 mg q 12 hrs, stop after 6 doses  2. 9/11, 9/12, 9/13: Hydrocortisone 25 mg q 12 hrs, stop after 6 doses  3. 9/14, 9/15: Hydrocortisone 25 mg daily, stop after 2 doses  Patient can then stop steroids thereafter    #Steroid induced hyperglycemia   HbA1c 5.4%. FS at goal 100-180  Hypo to 69 noted at 5:30 AM, improved to 210. Patient has been NPO and did not receive NPH but received 2 units of correctional insulin at 12am.   -decrease NPH to 4 units q6 hrs. Hold if NPO  -decrease correctional scale to low dose q6 hrs   -BS testing q6      Sia Aceves MD  Endocrine Fellow  Pager: [phone]/TIMBO [phone]  Consults 9am-5pm: [phone]  After 5pm and weekends: [phone]     65 year old male with history of Stage D HF due to NICM on LVAD, TV annuloplasty ring 9/12/17 as destination therapy due to severe peripheral artery disease with significant stenosis  SIADH, Depression, CKD-3 with hyperkalemia, admitted 6/14/21 with symptomatic anemia with Hgb 4.5 in setting of INR 8.8, thereafter with complicated hospital course including VAP, serratia bacteremia with acalculous cholecystitis s/p percutaneous tube, abdominal pain with unclear source other than possible mesenteric ischemia (from possible SMA stenosis? 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 Nearly two months later, on 8/7 - TSH was < 0.01  Likely 2/2 amiodarone use, exacerbated by IV contrast imaging (7/28 and 8/13). Toxic nodule is possible. Less likely Graves (negative TSH receptor Ab and TSI, but TPO Ab positive at 49.9).   Most likely Type 1 AIT given abrupt presentation after Amiodarone use and positive TPO Ab    Labs from 9/9 - improving  FT4 5.0 (from 5.3)  TT3 110 (from 126 and 165)    Recs  -Ideally, contrast imaging should be deferred until the patient is biochemically euthyroid as further iodine load can exacerbate thyrotoxicosis. However, if contrast imaging is required, risks of deferring the imaging need to be weighed against the risks of further contrast load in this setting.   -Recheck only free T4 and total T3 on 9/13/21  -Continue Methimazole to 20 mg daily.   -Alk phos, AST and ALT are elevated and stable range, but etiologies can be multifactorial. Watch for sudden changes in LFTs or biliary labs  -Continue Propranolol 40 mg q 8 hrs. Goal HR 60-80. At goal   -Continues teroid taper as follows;   1. 9/8, 9/9, 9/10: Hydrocortisone 50 mg q 12 hrs, stop after 6 doses  2. 9/11, 9/12, 9/13: Hydrocortisone 25 mg q 12 hrs, stop after 6 doses  3. 9/14, 9/15: Hydrocortisone 25 mg daily, stop after 2 doses  Patient can then stop steroids thereafter    #Steroid induced hyperglycemia   HbA1c 5.4%. FS at goal 100-180  Hypo to 69 noted at 5:30 AM, improved to 210. Patient has been NPO and did not receive NPH but received 2 units of correctional insulin at 12am.   -decrease NPH to 4 units q6 hrs. Hold if NPO  -decrease correctional scale to low dose q6 hrs   -BS testing q6    Discussed with Dr. Tipton and primary team    Sia Aceves MD  Endocrine Fellow  Pager: [phone]/TIMBO [phone]  Consults 9am-5pm: [phone]  After 5pm and weekends: [phone]

## 2021-09-10 NOTE — CHART NOTE - NSCHARTNOTEFT_GEN_A_CORE
Brief GI update  ==========  Team called regarding initiation of anti-platelet therapy in case of stenting of SMA stenosis.   No current GI bleeding and no clear contraindications at this time for antiplatelets.     Patient is at an increased risk of bleeding given previous history of recurrent GIB. Decision whether to pursue stenting and anti-platelet therapy should be discussed with patient and family and consider risks vs. benefits.    Thank you for involving us in the care of this patient. Please reach out if any further questions.    Pj Trevino, PGY-5  GI Fellow    Available on [employer] Teams  Pager [phone] ([facility]) or [phone] ([facility])  After 5PM/Weekends, please contact the on-call GI fellow: [phone]  Available through [employer] Teams

## 2021-09-11 NOTE — PROGRESS NOTE ADULT - PROBLEM SELECTOR PLAN 3
- Chronic in nature with questionable SMA stenosis on imaging and mesenteric duplex difficult to interpret with continuous flow. Underwent angiogram on 9/11 which showed SMA stenosis. Unable to place stent, needs repeat angiogram next wek.   - Continue to advance feeds as tolerates, goal rate 65 cc/hr  - Discussed cholecystostomy with GI surgery, unlikely to derive symptomatic benefit since perc dawn tube continues to drain. Repeat RUQ shows contracted gallbladder

## 2021-09-11 NOTE — PROGRESS NOTE ADULT - ASSESSMENT
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 coli UTIs, driveline drainage (1/7/21) and COVID-19, here initially for GIB prolonged hospital course, s/p tracheostomy, acalculous cholecystitis s/p perc dawn. Patient has persistent intermittent abdominal pain, CTA showing possible proximal SMA stenosis. CTA poor quality limited by significant streak artifact. Mesenteric duplex velocities without evidence of significant stenosis, however study unreliable in the setting of patient's augmented systolic function given LVAD. Now s/p diagnostic mesenteric angiogram with failed SMA stenting.     - Monitor L groin for signs of PSA or hematoma  - Trend H/H. Consider CTA if downtrending   - Continue excellent care per CTICU    Vascular Surgery  [id number]

## 2021-09-11 NOTE — PROGRESS NOTE ADULT - ASSESSMENT
Assessment and Recommendation:   · Assessment	  Assessment and recommendation :  Recurrent Acute hypoxic respiratory Failure S/P tracheostomy on full ventilatory support  50% FI02,   Acute blood loss anemia S/P multiple  blood transfusion   going for mesenteric angiogram  S/P septic shock off vasopressin , levophed and off  Dobutamine   S/P cholecystostomy tube placement by IR    AF RVR back to regular sinus Rhythm   Non ischemic cardiomyopathy continue ACE inhibitor and B-Blockers   mesenteric ischemia failure to stent SMA   S/P 3 unit of blood transfusion   S/P Septic shock and cardiogenic shock   Stage D systolic heart failure S/P LVAD HM2   MH2 LVAD  with  TV Annuloplasty  Severe peripheral vascular disease   severe hyperglycemia on insulin coverage    Reglan 10 mg for Gastric Motility   hyperthyroidism on Methimazole 10mg TID   critical care polyneuropathy   Anemia of Acute blood Loss   severe protein caloric malnutrition   S/P blood and FFP transfusion   Chronic kidney disease stage III  Depressed and withdrawn   on   NGT feeding on Vital  advanced to 55 cc/ hr   GI prophylaxis with PPI

## 2021-09-11 NOTE — PROGRESS NOTE ADULT - ASSESSMENT
65 YO M with a history of stage D NICM s/p HM2 on 9/2017 as DT (due to severe PAD) with TV ring, prior COVID-19 infection 4/2020, recurrent syncope post LVAD s/p ILR, and chronic abdominal pain with prior negative workup who was admitted 6/14/21 with symptomatic anemia with Hgb 4.5 in setting of INR 8.8 without hemodynamic instability and has since had a protracted hospitalization. He was transfused and underwent VCE which showed active bleeding in the mid small bowel but subsequent enteroscopy 6/15 did not reveal any active bleeding. He acutely decompensated after procedure with fever/hypertension, low flow alarms, and pulmonary infiltrate with hypoxia requiring intubation from probable aspiration PNA. He was unable to extubated and has since undergone tracheostomy but tolerating persistent trach collar and nearing ability for decannulation. 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 Short term goal is maintenance of adequate nutrition and eventual trach decannulation.     Underwent mesenteric angiogram on 9/10 with vascular surgery. Found to have SMA stenosis at its origin but unable to place stent. 3u PBCs transfused intraop and required Fem stop placement postop. Will wean vent support which was started preop and continue to advance tube feeds as tolerated. Vascular plans to repeat angiogram/stent next week.

## 2021-09-11 NOTE — PROGRESS NOTE ADULT - SUBJECTIVE AND OBJECTIVE BOX
Subjective:  Underwent mesenteric angiogram yesterday. Found to have SMA stenosis but unable to place stent. Report received of ?pseudoaneurysm requiring fem stop. Of note 3u PRBCs transfused intraop.   Pt back on vent support since he came back from the OR yesterday. HE is awake and alert.     Medications:  chlorhexidine 0.12% Liquid 15 milliLiter(s) Oral Mucosa every 12 hours  chlorhexidine 2% Cloths 1 Application(s) Topical <User Schedule>  chlorhexidine 4% Liquid 1 Application(s) Topical <User Schedule>  dextrose 40% Gel 15 Gram(s) Oral once  dextrose 5% + sodium chloride 0.45%. 1000 milliLiter(s) IV Continuous <Continuous>  dextrose 50% Injectable 25 Gram(s) IV Push once  dextrose 50% Injectable 12.5 Gram(s) IV Push once  glucagon  Injectable 1 milliGRAM(s) IntraMuscular once  hydrocortisone sodium succinate Injectable 25 milliGRAM(s) IV Push two times a day  insulin lispro (ADMELOG) corrective regimen sliding scale   SubCutaneous every 6 hours  insulin NPH human recombinant 6 Unit(s) SubCutaneous <User Schedule>  lidocaine   4% Patch 1 Patch Transdermal daily  methimazole 20 milliGRAM(s) Oral <User Schedule>  metoclopramide Injectable 10 milliGRAM(s) IV Push every 8 hours  mirtazapine 7.5 milliGRAM(s) Oral daily  multivitamin 1 Tablet(s) Oral daily  ondansetron Injectable 4 milliGRAM(s) IV Push every 6 hours PRN  pantoprazole  Injectable 40 milliGRAM(s) IV Push every 12 hours  propofol Infusion 20 MICROgram(s)/kG/Min IV Continuous <Continuous>  propranolol 40 milliGRAM(s) Oral every 8 hours  sertraline 100 milliGRAM(s) Oral daily  simethicone 80 milliGRAM(s) Chew every 8 hours PRN  sodium chloride 0.9% lock flush 10 milliLiter(s) IV Push every 1 hour PRN  sodium chloride 0.9%. 1000 milliLiter(s) IV Continuous <Continuous>  thiamine 100 milliGRAM(s) Oral daily  vasopressin Infusion 0.067 Unit(s)/Min IV Continuous <Continuous>    Vitals:  T(C): 36.5 (21 @ 12:00), Max: 36.6 (21 @ 08:00)  HR: 84 (21 @ 13:15) (69 - 84)  BP: --  BP(mean): --  RR: 20 (21 @ 13:15) (4 - 42)  SpO2: 100% (21 @ 13:15) (100% - 100%)    Daily     Daily Weight in k.5 (11 Sep 2021 00:00)    Weight (kg): 61.6 (09-10 @ 13:30)    I&O's Summary    10 Sep 2021 07:01  -  11 Sep 2021 07:00  --------------------------------------------------------  IN: 841 mL / OUT: 927 mL / NET: -86 mL    11 Sep 2021 07:01  -  11 Sep 2021 13:32  --------------------------------------------------------  IN: 400 mL / OUT: 340 mL / NET: 60 mL        Physical Exam:  Appearance: No Acute Distress  HEENT: JVP non elevated  Cardiovascular: VAD hum  Respiratory: Clear to auscultation bilaterally  Gastrointestinal: Soft, mild tender predom on LLQ  Neurologic: Non-focal  Extremities: No LE edema, warm and well perfused  Psychiatry: Alert    VAD Interrogation  Type:  3  Speed 9200 rpm  Flow 5.6 lpm  Power 5.8 gallegos  PI 5.9  Assessment of driveline exit: driveline dressing clean/dry/intact      Labs:                        9.1    13.52 )-----------( 158      ( 11 Sep 2021 00:53 )             27.7     11    133<L>  |  98  |  16  ----------------------------<  165<H>  4.0   |  23  |  0.47<L>    Ca    8.9      11 Sep 2021 00:53  Phos  3.5     11  Mg     1.6     11    TPro  6.0  /  Alb  2.8<L>  /  TBili  0.6  /  DBili  x   /  AST  42<H>  /  ALT  115<H>  /  AlkPhos  191<H>        PT/INR - ( 10 Sep 2021 20:48 )   PT: 15.2 sec;   INR: 1.28 ratio         PTT - ( 10 Sep 2021 20:48 )  PTT:83.7 sec          Lactate Dehydrogenase, Serum: 167 U/L ( @ 00:53)  Lactate Dehydrogenase, Serum: 193 U/L (09-10 @ 00:43)  Lactate Dehydrogenase, Serum: 289 U/L ( @ 08:34)

## 2021-09-11 NOTE — PROGRESS NOTE ADULT - SUBJECTIVE AND OBJECTIVE BOX
Subjective:  - s/p mesenteric angiogram yesterday. SMA stenosis noted however stent was unable to be advanced  - he required vasopressors post procedure and full vent support. Pressors and sedation have subsequently been weaned off.  - he remains on full vent support. notes diffuse abdominal tenderness to palpation but denies pain otherwise, denies SOB, nausea    Medications:  chlorhexidine 0.12% Liquid 15 milliLiter(s) Oral Mucosa every 12 hours  chlorhexidine 2% Cloths 1 Application(s) Topical <User Schedule>  chlorhexidine 4% Liquid 1 Application(s) Topical <User Schedule>  dextrose 40% Gel 15 Gram(s) Oral once  dextrose 5% + sodium chloride 0.45%. 1000 milliLiter(s) IV Continuous <Continuous>  dextrose 50% Injectable 25 Gram(s) IV Push once  dextrose 50% Injectable 12.5 Gram(s) IV Push once  glucagon  Injectable 1 milliGRAM(s) IntraMuscular once  hydrocortisone sodium succinate Injectable 25 milliGRAM(s) IV Push two times a day  insulin lispro (ADMELOG) corrective regimen sliding scale   SubCutaneous every 6 hours  insulin NPH human recombinant 6 Unit(s) SubCutaneous <User Schedule>  lidocaine   4% Patch 1 Patch Transdermal daily  methimazole 20 milliGRAM(s) Oral <User Schedule>  metoclopramide Injectable 10 milliGRAM(s) IV Push every 8 hours  mirtazapine 7.5 milliGRAM(s) Oral daily  multivitamin 1 Tablet(s) Oral daily  ondansetron Injectable 4 milliGRAM(s) IV Push every 6 hours PRN  pantoprazole  Injectable 40 milliGRAM(s) IV Push every 12 hours  propofol Infusion 20 MICROgram(s)/kG/Min IV Continuous <Continuous>  propranolol 40 milliGRAM(s) Oral every 8 hours  sertraline 100 milliGRAM(s) Oral daily  simethicone 80 milliGRAM(s) Chew every 8 hours PRN  sodium chloride 0.9% lock flush 10 milliLiter(s) IV Push every 1 hour PRN  sodium chloride 0.9%. 1000 milliLiter(s) IV Continuous <Continuous>  thiamine 100 milliGRAM(s) Oral daily  vasopressin Infusion 0.067 Unit(s)/Min IV Continuous <Continuous>      Physical Exam:    Vitals:  Vital Signs Last 24 Hours  T(C): 36.5 (21 @ 12:00), Max: 36.6 (21 @ 08:00)  HR: 83 (21 @ 13:00) (69 - 84)  BP: MAPs 77-81  RR: 17 (21 @ 13:00) (4 - 42)  SpO2: 100% (21 @ 13:00) (100% - 100%)    LVAD Interrogation:  HM2  Speed 9200  Flow 5  Power 5.1  PI 5.3  Few PI events without speed drops  DLES with dressing C/D/I    Weight in k.5 ( @ 00:00)    I&O's Summary    10 Sep 2021 07:  -  11 Sep 2021 07:00  --------------------------------------------------------  IN: 841 mL / OUT: 927 mL / NET: -86 mL    11 Sep 2021 07:  -  11 Sep 2021 13:15  --------------------------------------------------------  IN: 400 mL / OUT: 340 mL / NET: 60 mL    Tele: SR    General: Frail. No distress. Comfortable.  HEENT: EOM intact. Trach midline  Neck: Neck supple. JVP not elevated. No masses  Chest: Clear to auscultation bilaterally, compliant with vent  CV: LVAD hum. no LE edema  Abdomen: Soft, non-distended, diffusely tender to palpation  Skin: No rashes or skin breakdown. DLES dressing C/D/I  Neurology: Alert and nodding to questions. Sensation intact  Psych: Flat/depressed affect.    Labs:                        9.1    13.52 )-----------( 158      ( 11 Sep 2021 00:53 )             27.7         133<L>  |  98  |  16  ----------------------------<  165<H>  4.0   |  23  |  0.47<L>    Ca    8.9      11 Sep 2021 00:53  Phos  3.5       Mg     1.6         TPro  6.0  /  Alb  2.8<L>  /  TBili  0.6  /  DBili  x   /  AST  42<H>  /  ALT  115<H>  /  AlkPhos  191<H>      PT/INR - ( 10 Sep 2021 20:48 )   PT: 15.2 sec;   INR: 1.28 ratio         PTT - ( 10 Sep 2021 20:48 )  PTT:83.7 sec    Lactate Dehydrogenase, Serum: 167 U/L ( @ 00:53)  Lactate Dehydrogenase, Serum: 193 U/L (09-10 @ 00:43)  Lactate Dehydrogenase, Serum: 289 U/L ( @ 08:34)

## 2021-09-11 NOTE — PROGRESS NOTE ADULT - ASSESSMENT
65 YO M with a history of stage D NICM s/p HM2 on 9/2017 as DT (due to severe PAD) with TV ring, prior COVID-19 infection 4/2020, recurrent syncope post LVAD s/p ILR, and chronic abdominal pain with prior negative workup who was admitted 6/14/21 with symptomatic anemia with Hgb 4.5 in setting of INR 8.8 without hemodynamic instability and has since had a protracted hospitalization. He was transfused and underwent VCE which showed active bleeding in the mid small bowel but subsequent enteroscopy 6/15 did not reveal any active bleeding. He acutely decompensated after procedure with fever/hypertension, low flow alarms, and pulmonary infiltrate with hypoxia requiring intubation from probable aspiration PNA. He was unable to extubated and has since undergone tracheostomy but tolerating persistent trach collar and nearing ability for decannulation. 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 Short term goal is maintenance of adequate nutrition and eventual trach decannulation.     S/p mesenteric angiogram, stenosis of SMA noted unable to be stented at time of procedure. He remains on full vent support currently with minimal settings. He has been weaned off sedation and pressors post procedure with MAPs at goal of 70-80. He appears euovlemic on exam with few PI events noted on LVAD interrogation. LDH stable.

## 2021-09-11 NOTE — PROGRESS NOTE ADULT - PROBLEM SELECTOR PLAN 6
- s/p trach 6/25  - had initially been tolerating trach collar, will consider decannulation closer to discharge when nutritionally replete and stronger as he still has significant secretions  - wean vent as able

## 2021-09-11 NOTE — PROGRESS NOTE ADULT - SUBJECTIVE AND OBJECTIVE BOX
[facility] NUTRITION SUPPORT--  Attending/ PA FOLLOW UP NOTE      24 hour events/subjective: TPN originally consulted for nutrition support and started TPN on ____.  Pt is tolerating without  issues, and denies palpitations, chest pain, shortness of breath. Pt further denies fevers, chills nor night sweats. Denies nausea nor vomiting nor abdominal pain.        PAST HISTORY  --------------------------------------------------------------------------------  No significant changes to PMH, PSH, FHx, SHx, unless otherwise noted    ALLERGIES & MEDICATIONS  --------------------------------------------------------------------------------  Allergies    Amiodarone Hydrochloride (Other (Severe))    Intolerances      Standing Inpatient Medications  chlorhexidine 0.12% Liquid 15 milliLiter(s) Oral Mucosa every 12 hours  chlorhexidine 2% Cloths 1 Application(s) Topical <User Schedule>  chlorhexidine 4% Liquid 1 Application(s) Topical <User Schedule>  dextrose 40% Gel 15 Gram(s) Oral once  dextrose 5% + sodium chloride 0.45%. 1000 milliLiter(s) IV Continuous <Continuous>  dextrose 50% Injectable 25 Gram(s) IV Push once  dextrose 50% Injectable 12.5 Gram(s) IV Push once  glucagon  Injectable 1 milliGRAM(s) IntraMuscular once  hydrocortisone sodium succinate Injectable 25 milliGRAM(s) IV Push two times a day  insulin lispro (ADMELOG) corrective regimen sliding scale   SubCutaneous every 6 hours  insulin NPH human recombinant 6 Unit(s) SubCutaneous <User Schedule>  lidocaine   4% Patch 1 Patch Transdermal daily  methimazole 20 milliGRAM(s) Oral <User Schedule>  metoclopramide Injectable 10 milliGRAM(s) IV Push every 8 hours  mirtazapine 7.5 milliGRAM(s) Oral daily  multivitamin 1 Tablet(s) Oral daily  pantoprazole  Injectable 40 milliGRAM(s) IV Push every 12 hours  propofol Infusion 20 MICROgram(s)/kG/Min IV Continuous <Continuous>  propranolol 40 milliGRAM(s) Oral every 8 hours  sertraline 100 milliGRAM(s) Oral daily  sodium chloride 0.9%. 1000 milliLiter(s) IV Continuous <Continuous>  thiamine 100 milliGRAM(s) Oral daily  vasopressin Infusion 0.067 Unit(s)/Min IV Continuous <Continuous>    PRN Inpatient Medications  ondansetron Injectable 4 milliGRAM(s) IV Push every 6 hours PRN  simethicone 80 milliGRAM(s) Chew every 8 hours PRN  sodium chloride 0.9% lock flush 10 milliLiter(s) IV Push every 1 hour PRN      REVIEW OF SYSTEMS  --------------------------------------------------------------------------------  Gen: as per HPI  Skin: No rashes  Head/Eyes/Ears/Mouth: No headache;No sore throat  Respiratory: No dyspnea, cough,   CV: No chest pain, PND, orthopnea  GI: as per HPI  : No increased frequency, dysuria, hematuria, nocturia  MSK: No joint pain/swelling; no back pain; no edema  Neuro: No dizziness/lightheadedness, weakness, seizures, numbness, tingling  Psych: No significant nervousness, anxiety, stress, depression    All other systems were reviewed and are negative, except as noted.      LABS/STUDIES  --------------------------------------------------------------------------------              9.1    13.52 >-----------<  158      [09-11-21 @ 00:53]              27.7     133  |  98  |  16  ----------------------------<  165      [09-11-21 @ 00:53]  4.0   |  23  |  0.47        Ca     8.9     [09-11-21 @ 00:53]      Mg     1.6     [09-11-21 @ 00:53]      Phos  3.5     [09-11-21 @ 00:53]    TPro  6.0  /  Alb  2.8  /  TBili  0.6  /  DBili  x   /  AST  42  /  ALT  115  /  AlkPhos  191  [09-11-21 @ 00:53]    PT/INR: PT 15.2 , INR 1.28       [09-10-21 @ 20:48]  PTT: 83.7       [09-10-21 @ 20:48]          [09-11-21 @ 00:53]    Blood Gas Arterial - Calcium, Ionized: 1.29 mmol/L (09-11-21 @ 14:14)  Blood Gas Arterial - Calcium, Ionized: 1.28 mmol/L (09-11-21 @ 00:27)    Glucose, Serum: 165 mg/dL (09-11-21 @ 00:53)  Glucose, Serum: 147 mg/dL (09-10-21 @ 20:48)    Prealbumin, Serum: 11 mg/dL (08-16-21 @ 04:01)  Prealbumin, Serum: 10 mg/dL (08-15-21 @ 13:53)          09-10-21 @ 07:01  -  09-11-21 @ 07:00  --------------------------------------------------------  IN: 841 mL / OUT: 927 mL / NET: -86 mL    09-11-21 @ 07:01  -  09-11-21 @ 14:27  --------------------------------------------------------  IN: 480 mL / OUT: 400 mL / NET: 80 mL        VITALS/PHYSICAL EXAM  --------------------------------------------------------------------------------  T(C): 36.5 (09-11-21 @ 12:00), Max: 36.6 (09-11-21 @ 08:00)  HR: 91 (09-11-21 @ 14:00) (69 - 91)  BP: --  RR: 22 (09-11-21 @ 14:00) (4 - 42)  SpO2: 100% (09-11-21 @ 14:00) (100% - 100%)  Wt(kg): --  Height (cm): 177.8 (09-10-21 @ 13:30)  Weight (kg): 61.6 (09-10-21 @ 13:30)  BMI (kg/m2): 19.5 (09-10-21 @ 13:30)  BSA (m2): 1.77 (09-10-21 @ 13:30)    Physical Exam:    Gen: laying in bed in NAD  HEENT: NCAT PERRL +NGT  Neck: trachea midline, no noted JVD  Chest: respirations non labored  Abd: soft non distended  Extrem: WWP no edema noted  Neuro: awake alert appropriate  Skin: warm, no rashes noted    .  .  .   [facility] NUTRITION SUPPORT--  Attending/ PA FOLLOW UP NOTE      24 hour events/subjective: TPN originally consulted for nutrition support however pt had been tolerating Tube feeds close to goal at 55cc/hr (goal 65cc/hr), pt with intermittent abdominal pain andl nausea, no emeiss. Pt POD#2 s/p mesenteric angio, but unable to pass stent.        PAST HISTORY  --------------------------------------------------------------------------------  No significant changes to PMH, PSH, FHx, SHx, unless otherwise noted    ALLERGIES & MEDICATIONS  --------------------------------------------------------------------------------  Allergies    Amiodarone Hydrochloride (Other (Severe))    Intolerances      Standing Inpatient Medications  chlorhexidine 0.12% Liquid 15 milliLiter(s) Oral Mucosa every 12 hours  chlorhexidine 2% Cloths 1 Application(s) Topical <User Schedule>  chlorhexidine 4% Liquid 1 Application(s) Topical <User Schedule>  dextrose 40% Gel 15 Gram(s) Oral once  dextrose 5% + sodium chloride 0.45%. 1000 milliLiter(s) IV Continuous <Continuous>  dextrose 50% Injectable 25 Gram(s) IV Push once  dextrose 50% Injectable 12.5 Gram(s) IV Push once  glucagon  Injectable 1 milliGRAM(s) IntraMuscular once  hydrocortisone sodium succinate Injectable 25 milliGRAM(s) IV Push two times a day  insulin lispro (ADMELOG) corrective regimen sliding scale   SubCutaneous every 6 hours  insulin NPH human recombinant 6 Unit(s) SubCutaneous <User Schedule>  lidocaine   4% Patch 1 Patch Transdermal daily  methimazole 20 milliGRAM(s) Oral <User Schedule>  metoclopramide Injectable 10 milliGRAM(s) IV Push every 8 hours  mirtazapine 7.5 milliGRAM(s) Oral daily  multivitamin 1 Tablet(s) Oral daily  pantoprazole  Injectable 40 milliGRAM(s) IV Push every 12 hours  propofol Infusion 20 MICROgram(s)/kG/Min IV Continuous <Continuous>  propranolol 40 milliGRAM(s) Oral every 8 hours  sertraline 100 milliGRAM(s) Oral daily  sodium chloride 0.9%. 1000 milliLiter(s) IV Continuous <Continuous>  thiamine 100 milliGRAM(s) Oral daily  vasopressin Infusion 0.067 Unit(s)/Min IV Continuous <Continuous>    PRN Inpatient Medications  ondansetron Injectable 4 milliGRAM(s) IV Push every 6 hours PRN  simethicone 80 milliGRAM(s) Chew every 8 hours PRN  sodium chloride 0.9% lock flush 10 milliLiter(s) IV Push every 1 hour PRN      REVIEW OF SYSTEMS  --------------------------------------------------------------------------------  Gen: as per HPI  Skin: No rashes  Head/Eyes/Ears/Mouth: No headache;No sore throat  Respiratory: No dyspnea, cough,   CV: No chest pain, PND, orthopnea  GI: as per HPI  : No increased frequency, dysuria, hematuria, nocturia  MSK: No joint pain/swelling; no back pain; no edema  Neuro: No dizziness/lightheadedness, weakness, seizures, numbness, tingling  Psych: No significant nervousness, anxiety, stress, depression    All other systems were reviewed and are negative, except as noted.      LABS/STUDIES  --------------------------------------------------------------------------------              9.1    13.52 >-----------<  158      [09-11-21 @ 00:53]              27.7     133  |  98  |  16  ----------------------------<  165      [09-11-21 @ 00:53]  4.0   |  23  |  0.47        Ca     8.9     [09-11-21 @ 00:53]      Mg     1.6     [09-11-21 @ 00:53]      Phos  3.5     [09-11-21 @ 00:53]    TPro  6.0  /  Alb  2.8  /  TBili  0.6  /  DBili  x   /  AST  42  /  ALT  115  /  AlkPhos  191  [09-11-21 @ 00:53]    PT/INR: PT 15.2 , INR 1.28       [09-10-21 @ 20:48]  PTT: 83.7       [09-10-21 @ 20:48]          [09-11-21 @ 00:53]    Blood Gas Arterial - Calcium, Ionized: 1.29 mmol/L (09-11-21 @ 14:14)  Blood Gas Arterial - Calcium, Ionized: 1.28 mmol/L (09-11-21 @ 00:27)    Glucose, Serum: 165 mg/dL (09-11-21 @ 00:53)  Glucose, Serum: 147 mg/dL (09-10-21 @ 20:48)    Prealbumin, Serum: 11 mg/dL (08-16-21 @ 04:01)  Prealbumin, Serum: 10 mg/dL (08-15-21 @ 13:53)          09-10-21 @ 07:01  -  09-11-21 @ 07:00  --------------------------------------------------------  IN: 841 mL / OUT: 927 mL / NET: -86 mL    09-11-21 @ 07:01  -  09-11-21 @ 14:27  --------------------------------------------------------  IN: 480 mL / OUT: 400 mL / NET: 80 mL        VITALS/PHYSICAL EXAM  --------------------------------------------------------------------------------  T(C): 36.5 (09-11-21 @ 12:00), Max: 36.6 (09-11-21 @ 08:00)  HR: 91 (09-11-21 @ 14:00) (69 - 91)  BP: --  RR: 22 (09-11-21 @ 14:00) (4 - 42)  SpO2: 100% (09-11-21 @ 14:00) (100% - 100%)  Wt(kg): --  Height (cm): 177.8 (09-10-21 @ 13:30)  Weight (kg): 61.6 (09-10-21 @ 13:30)  BMI (kg/m2): 19.5 (09-10-21 @ 13:30)  BSA (m2): 1.77 (09-10-21 @ 13:30)    Physical Exam:    Gen: laying in bed in NAD  HEENT: NCAT PERRL +NGT  Neck: trachea midline, no noted JVD  Chest: respirations non labored  Abd: soft non distended  Extrem: WWP no edema noted  Neuro: awake alert appropriate  Skin: warm, no rashes noted    .  .  .

## 2021-09-11 NOTE — CHART NOTE - NSCHARTNOTEFT_GEN_A_CORE
Pt being followed by this service. Seen at bedside for attempted repeat evaluation of swallow and PMV trial. However, Pt remains on vent, on CPAP at this time. Given plan for weaning to trach collar, will defer trials pending tolerating TC. Plan d/w Pt, DILIP Fay, and CLAUDIA Alonso This service will continue to follow.    Gege Hernandez MA, CCC-SLP  Speech-Language Pathologist  pager #[phone]

## 2021-09-11 NOTE — PROGRESS NOTE ADULT - SUBJECTIVE AND OBJECTIVE BOX
RICKY JOINT  MRN#: [id number]  Subjective:  Pulmonary progress  : recurrent Acute hypoxic respiratory Failure ,aspiration pneumonia, NICM  , chart reviewed and H/O obtained radiological and Laboratory study reviewed patient Examined     65M PMH ACC/AHA stage D HF due to NICM HM2 LVAD , TV annuloplasty ring 17 as destination therapy due to severe peripheral artery disease with significant stenosis  SIADH, Depression, CKD-3 with hyperkalemia, past E. coli UTIs, driveline drainage (21) and COVID-19 (back in 2020)  He was recently seen in clinic where he complained of abdominal pain and dark stools w constipation back in May. He presents to [facility] ER today weakness and fatigue, moderate and + Black stools for three days, on coumadin secondary to warfarin use in the setting of an LVAD. Patient has required transfusions for GIB in the past. Mostly recently back in 2021 pt had anemia with dark stools. No interventions was done at that time. However Last Endoscopy was done in 2020 (negative). Today labs show patient is anemic with H/H of 4.5/16.3,. INR is 8.84 MAP in the 90s, Temp 35.1. He denies any chest pain, shortness of breath, dizziness, abd pain, nausea or vomiting. found to have  rectal bleeding underwent endoscopy ,old blood in the proximal ileum ,  develop sepsis with LL opacity given Antibiotics , Extubated , reintubated , Bronchoscopy on Zosyn for LL pneumonia  and Amiodarone S/P TV Annuloplasty , patient remain intubated on full ventilatory support .S/P multiple units of blood transfusion , remain on full ventilatory support on Precedex and propofol , new central IJ line , diarrhea C diff. +ve on po vancomycin and IV Flagyl,  mildly distended belly , fever start on cefepime 2gm q 8 hrs S/P tracheostomy .  new RT Subclavian central line continue on contact  isolation ,still diarrhea on C-diff antibiotics ENT follow up appreciated , trial of C-PAP as tolerated , , copious secretion from trach. site chest x ray left lower lobe pneumonia , tolerating trch. collar 50% FI02 still excessive secretion need pulmonary toilet , off Ancef antibiotic , no more diarrhea back on full support mechanical ventilator , chest x ray show improvement in LLL air space disease, more awake and responsive on tube feeding no more diarrhea , S/P  Ancef for bacteremia no fever ,ID follow up noted ,  no nausea or vomiting or diarrhea still very weak and tired , event note pulled NG tube now replaced , back on tube feeding ,still on po vancomycin , getting PT and OT at bed side , no plan for decannulation for now. , no more diarrhea receiving PT and OPT at bed side , minimal secretion from tracheostomy site , no SOB getting stronger , improve muscle tone patient transfer to monitor bed still on contact isolation for C-Difficel colitis on 50% FI02, NG tube clogged , and change to resume tube feeding still loose stool . H/H drop significantly require blood transfusion , most likely GI bleeding , IV heparin D/C , vomiting 200 cc of creamy color tube feeding on hold no sob, still melena monitor in the CTU possible capsule endoscopy , H/H is stable ., patient develop TR sided  pneumothorax require chest tube placement , RT IJ central line  placed , develop fever shaking chills , blood culture positive for serratia marcescens , start on cefepime .the patient  become hypoxic and hypotensive placed on full ventilatory support and Vasopressin , levophed and Dobutamine ,S/P blood transfusion on meropenem and vancomycin ,   , on and  off pressors , occasional agitation on Precedex .S/P IR cholecystostomy tube drainage placement in the RT upper Quadrant , resume anticoagulation chest x ray noted C-PAP trail lasted only for 2 hrs , new RT SC line and D/C RT IJ line , RT pig tail cathter has been removed , tolerating C-PAP trial placed on trach. collar 50% FI02 GI consultant noted on NG tube feeding as tolerated , develop AF RVR S/P  bolus Amiodarone  back to regular sinus Rhythm , Flat Affect depressed , back on tube feeding Vital AF at 60 cc/ hr .still intermittent abdominal pain , no fever saturation is accepted  back on full ventilatory support , tired and sleepy on round  .start  back on  tube feeding . tolerating it very well , no nausea or vomiting , good 02 saturation on trac-collar on methimazole for hyperthyroidism , no new C/O no plan for decannulation yet , electrolyte blood test is still pending .on respiratory isolation sputum culture is negative , increase WBC  improved on cefepime , sputum positive for enterococcus , condition is the same ,still C/O Abdominal pain , white count is improving , no chest pain or SOB ,  .placed on Reglan 10 mg TID for gastric motility , depressed , withdrawn. on full NG tube diet with Vital , secretion are much improved , went for mesenteric angiogram , unable to stent SMA S/P 3 units of blood transfusion on full ventilatory support .       (2021 16:57)    PAST MEDICAL & SURGICAL HISTORY:  CHF (congestive heart failure)    CAD (coronary artery disease)  Depression    Pleural effusion    History of 2019 novel coronavirus disease (COVID-19)  2020    Hemorrhoids    Bleeding hemorrhoids    Peripheral arterial disease    Claudication    BPH with urinary obstruction    ACC/AHA stage D systolic heart failure  Anticoagulation goal of INR 2.0 to 2.5    Falls    Clavicle fracture    CKD (chronic kidney disease), stage III    Iron deficiency anemia    H/O epistaxis    Vertigo    GI bleed    S/P TVR (tricuspid valve repair)    S/P ventricular assist device    S/P endoscopy    OBJECTIVE:  ICU Vital Signs Last 24 Hrs  T(C): 38.2 (2021 10:00), Max: 38.5 (2021 12:00)  T(F): 100.8 (2021 10:00), Max: 101.3 (2021 12:00)  HR: 65 (2021 10:00) (61 - 69)  BP: --  BP(mean): --  ABP: 105/67 (2021 10:00) (90/54 - 113/64)  ABP(mean): 77 (2021 10:00) (63 - 77)  RR: 20 (2021 10:00) (19 - 35)  SpO2: 99% (2021 10:00) (96% - 100%)       @ :  -   @ 07:00  --------------------------------------------------------  IN: 2693.9 mL / OUT: 1415 mL / NET: 1278.9 mL     @ 07: @ 10:49  --------------------------------------------------------  IN: 420.8 mL / OUT: 115 mL / NET: 305.8 mL  PHYSICAL EXAM:Daily   Elderly male S/P tracheostomy on full ventilatory support  50% FI02 ,  Daily Weight in k.4 (2021 00:00)  HEENT:     + NCAT  + EOMI  - Conjuctival edema   - Icterus   - Thrush   - ETT  + NGT/OGT  Neck:         + FROM  RT IJ  line JVD  - Nodes - Masses + Mid-line trachea + Tracheostomy  Chest:            normal A-P diameter    Lungs:          + CTA   + Rhonchi    - Rales    - Wheezing + Decreased  LT BS   - Dullness R L  Cardiac:       + S1 + S2    + RRR   - Irregular   - S3  - S4    - Murmurs   - Rub   - Hamman’s sign   Abdomen:    + BS  + Soft + Non-tender - Distended - Organomegaly - PEG .cholecystostomy tube in place  Extremities:   - Cyanosis U/L   - Clubbing  U/L  + LE/UE Edema   + Capillary refill    + Pulses   Neuro:        - Awake   -  Alert   - Confused   - Lethargic   + Sedated  + Generalized Weakness  Skin:        - Rashes    - Erythema   + Normal incisions   + IV sites intact          HOSPITAL MEDICATIONS: All medications reviewed and analyzed  MEDICATIONS  (STANDING):  amiodarone    Tablet 200 milliGRAM(s) Oral daily  chlorhexidine 0.12% Liquid 15 milliLiter(s) Oral Mucosa every 12 hours  chlorhexidine 2% Cloths 1 Application(s) Topical <User Schedule>  dexmedetomidine Infusion 0.5 MICROgram(s)/kG/Hr (9.81 mL/Hr) IV Continuous <Continuous>  dextrose 50% Injectable 50 milliLiter(s) IV Push every 15 minutes  heparin  Infusion 400 Unit(s)/Hr (12.5 mL/Hr) IV Continuous <Continuous>  Hydromorphone  Injectable 0.5 milliGRAM(s) IV Push once  insulin lispro (ADMELOG) corrective regimen sliding scale   SubCutaneous every 6 hours  pantoprazole  Injectable 40 milliGRAM(s) IV Push every 12 hours  piperacillin/tazobactam IVPB.. 3.375 Gram(s) IV Intermittent every 8 hours  propofol Infusion 20 MICROgram(s)/kG/Min (9.42 mL/Hr) IV Continuous <Continuous>  sodium chloride 0.9% lock flush 3 milliLiter(s) IV Push every 8 hours  sodium chloride 0.9%. 1000 milliLiter(s) (10 mL/Hr) IV Continuous <Continuous>    MEDICATIONS  (PRN):  acetaminophen    Suspension .. 650 milliGRAM(s) Oral every 6 hours PRN Temp greater or equal to 38C (100.4F)    LABS: All Lab data reviewed and analyzed                          9.1    13.52 )-----------( 158      ( 11 Sep 2021 00:53 )             27.7        133<L>  |  98  |  16  ----------------------------<  165<H>  4.0   |  23  |  0.47<L>    Ca    8.9      11 Sep 2021 00:53  Phos  3.5       Mg     1.6         TPro  6.0  /  Alb  2.8<L>  /  TBili  0.6  /  DBili  x   /  AST  42<H>  /  ALT  115<H>  /  AlkPhos  191<H>        Ca    9.5      10 Sep 2021 00:43  Phos  3.7     09-10  Mg     1.8     09-10    TPro  7.3  /  Alb  3.1<L>  /  TBili  0.3  /  DBili  x   /  AST  47<H>  /  ALT  171<H>  /  AlkPhos  259<H>  09-10                                                                                                                              PTT - ( 2021 04:52 )  PTT:45.2 sec LIVER FUNCTIONS - ( 2021 00:42 )  Alb: 3.4 g/dL / Pro: 6.7 g/dL / ALK PHOS: 213 U/L / ALT: 15 U/L / AST: 24 U/L / GGT: x           RADIOLOGY: - Reviewed and analyzed RT Pig tail cathter  , LVAD HM2, CT scan of abdomen reviewed result noted

## 2021-09-11 NOTE — PROGRESS NOTE ADULT - PROBLEM SELECTOR PLAN 5
- Hgb dropped from 10.2 to 9.1.  - No overt s/s of bleeding cath site without hematoma  - closely monitor CBC. If continues to downtrend recommend evaluation for RP bleed

## 2021-09-11 NOTE — PROGRESS NOTE ADULT - PROBLEM SELECTOR PLAN 7
- endocrine recs appreciated  - currently on methimazole, propranolol, and steroids  - Steroid taper as follows:  9/10: Hydrocortisone 50 mg q 12 hrs, stop after 6 doses  9/11, 9/12, 9/13: Hydrocortisone 25 mg q 12 hrs, stop after 6 doses  9/14, 9/15: Hydrocortisone 25 mg daily, stop after 2 doses  Stop steroids thereafter.   - Benefits of performing angiogram/contrast exposure outweigh risks. Conitnue treatment for hyperTSH and monitor TFTs post contrast exposure.

## 2021-09-11 NOTE — PROGRESS NOTE ADULT - ASSESSMENT
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 coli UTIs, driveline drainage (1/7/21) and COVID-19, here initially for GIB prolonged hospital course, s/p tracheostomy, acalculous cholecystitis s/p perc dawn. Patient has persistent intermittent abdominal pain, and was being evaluated for chronic mesenteric ischemia vs SMA syndrome.  8/13 bld cxs positive. TPN was consulted for Protein Calorie Malnutrition, Prealb 11, prior a1c 5.6, tg 141. Mesenteric duplex showied borderline stenosis of prox SMA,  Pt now s/p Mesenteric angiogram 9/10 unable to place stent. Pt had been tolerating feeds but with intermittent abd pain. SLP eval had recommended NPO status.    -Tube feeds- Vital 1.5 previously tolerated at 55cc/hr (goal 65cc/hr) has been on hold since yesterday as pt went to OR for mesenteric angiogram.  - Nutriitional assessment. Pt has been tolerating tube feeds close to goal. TPN not indicated for additional nutritional support at this time.  -suspect hyperthyroidism contributing in part to metabolic issues ie. cAMP effects GI motility, hypercalcemia, tachycardia; with some clinical improvement, further management as per endo  -Electrolyte Imbalance Risk. Daily CMP, Mg, Phos, K.  h/o hypomagnesemia- rec replete as per CTU  -h/o abdominal pain/vomiting- as above, Zofran prn for nausea; Reglan for gut motility; Vascular input noted, s/p Mesenteric Angiogram 9/10, unable to pass stent. f/u Vascular recs  -Anemia- rec iron supp when feasible  -Management as per CTU; will remain available as needed    plan discussed with Dr. Soliz and Dr ANNE Tang, agreeable with above    TPN pager [phone], spectra [phone]  M-F 6A-4P, Weekends and holidays 8A-12P

## 2021-09-11 NOTE — PROGRESS NOTE ADULT - PROBLEM SELECTOR PLAN 4
- serratia bacteremia and poss acalculous cholecystitis. B/c with gram + cocci and many enterobacter Carbapenem resistant strain and now s/p per dawn drain  - also with enterobacter from sputum culture 8/13  - off antibiotics  - WBC improved

## 2021-09-11 NOTE — PROGRESS NOTE ADULT - PROBLEM SELECTOR PLAN 3
- Chronic in nature with questionable SMA stenosis on imaging and mesenteric duplex difficult to interpret with continuous flow. s/p angiogram with SMA stenosis unable to advance stent into SMA. Follow up with vascular re possible intervention. If a stent is placed will need to closely monitor patient given his previous history of recurrent GIB. Will also need to monitor TFTs given contrast exposure.   - Continue to advance feeds as tolerates, goal rate 65 cc/hr  - Discussed cholecystostomy with GI surgery, unlikely to derive symptomatic benefit since perc dawn tube continues to drain. Repeat RUQ shows contracted gallbladder

## 2021-09-11 NOTE — PROGRESS NOTE ADULT - SUBJECTIVE AND OBJECTIVE BOX
SURGERY PROGRESS NOTE    SUBJECTIVE / 24H EVENTS:  Patient seen and examined on morning rounds. S/p diagnostic mesenteric angiogram yesterday, noted SMA stenosis at origin, but unable to successfully advance stent into SMA. 3u pRBC intraop, no . No acute events or further transfusion requirements overnight. Patient reporting continued abdominal pain this morning, denies back pain.     OBJECTIVE:  VITAL SIGNS:  T(C): 36.5 (21 @ 12:00), Max: 36.6 (21 @ 08:00)  HR: 83 (21 @ 13:00) (69 - 84)  BP: --  RR: 17 (21 @ 13:00) (4 - 42)  SpO2: 100% (21 @ 13:00) (100% - 100%)  Daily Height in cm: 177.8 (10 Sep 2021 13:30)    Daily Weight in k.5 (11 Sep 2021 00:00)  POCT Blood Glucose.: 138 mg/dL (21 @ 11:56)  POCT Blood Glucose.: 116 mg/dL (21 @ 05:14)  POCT Blood Glucose.: 177 mg/dL (21 @ 00:24)      PHYSICAL EXAM:  Gen: NAD  LS: Respirations unlabored   GI: Soft. Nontender. Nondistended. BS+.  Ext: Warm, well perfused      09-10-21 @ 07:01  -  21 @ 07:00  --------------------------------------------------------  IN:    Albumin 5%  - 250 mL: 250 mL    dextrose 5% + sodium chloride 0.45%: 400 mL    Propofol: 167 mL    Vasopressin: 24 mL  Total IN: 841 mL    OUT:    Drain (mL): 250 mL    Indwelling Catheter - Urethral (mL): 475 mL    Miscellaneous Tube Feedin mL    Stool (mL): 1 mL    Stool (mL): 1 mL    Voided (mL): 100 mL    Voided (mL): 100 mL  Total OUT: 927 mL    Total NET: -86 mL      21 @ 07:01  -  21 @ 13:15  --------------------------------------------------------  IN:    dextrose 5% + sodium chloride 0.45%: 280 mL    Miscellaneous Tube Feedin mL    sodium chloride 0.9%: 70 mL  Total IN: 400 mL    OUT:    Drain (mL): 100 mL    Indwelling Catheter - Urethral (mL): 240 mL    Propofol: 0 mL    Vasopressin: 0 mL  Total OUT: 340 mL    Total NET: 60 mL          LAB VALUES:      133<L>  |  98  |  16  ----------------------------<  165<H>  4.0   |  23  |  0.47<L>    Ca    8.9      11 Sep 2021 00:53  Phos  3.5       Mg     1.6         TPro  6.0  /  Alb  2.8<L>  /  TBili  0.6  /  DBili  x   /  AST  42<H>  /  ALT  115<H>  /  AlkPhos  191<H>                                 9.1    13.52 )-----------( 158      ( 11 Sep 2021 00:53 )             27.7     LIVER FUNCTIONS - ( 11 Sep 2021 00:53 )  Alb: 2.8 g/dL / Pro: 6.0 g/dL / ALK PHOS: 191 U/L / ALT: 115 U/L / AST: 42 U/L / GGT: x           PT/INR - ( 10 Sep 2021 20:48 )   PT: 15.2 sec;   INR: 1.28 ratio         PTT - ( 10 Sep 2021 20:48 )  PTT:83.7 sec  ABG - ( 11 Sep 2021 00:27 )  pH, Arterial: 7.35  pH, Blood: x     /  pCO2: 46    /  pO2: 191   / HCO3: 25    / Base Excess: -0.4  /  SaO2: 100.0                     MICROBIOLOGY:    Culture - Sputum (collected 08 Sep 2021 14:58)  Source: .Sputum Sputum  Gram Stain (08 Sep 2021 17:34):    Few Squamous epithelial cells per low power field    Few polymorphonuclear leukocytes per low power field    Rare Gram positive cocci in pairs per oil power field  Final Report (10 Sep 2021 18:27):    Normal Respiratory Bria present        RADIOLOGY:  PACS Image: Image(s) Available (21 @ 01:10)        MEDICATIONS  (STANDING):  chlorhexidine 0.12% Liquid 15 milliLiter(s) Oral Mucosa every 12 hours  chlorhexidine 2% Cloths 1 Application(s) Topical <User Schedule>  chlorhexidine 4% Liquid 1 Application(s) Topical <User Schedule>  dextrose 40% Gel 15 Gram(s) Oral once  dextrose 5% + sodium chloride 0.45%. 1000 milliLiter(s) (40 mL/Hr) IV Continuous <Continuous>  dextrose 50% Injectable 25 Gram(s) IV Push once  dextrose 50% Injectable 12.5 Gram(s) IV Push once  glucagon  Injectable 1 milliGRAM(s) IntraMuscular once  hydrocortisone sodium succinate Injectable 25 milliGRAM(s) IV Push two times a day  insulin lispro (ADMELOG) corrective regimen sliding scale   SubCutaneous every 6 hours  insulin NPH human recombinant 6 Unit(s) SubCutaneous <User Schedule>  lidocaine   4% Patch 1 Patch Transdermal daily  methimazole 20 milliGRAM(s) Oral <User Schedule>  metoclopramide Injectable 10 milliGRAM(s) IV Push every 8 hours  mirtazapine 7.5 milliGRAM(s) Oral daily  multivitamin 1 Tablet(s) Oral daily  pantoprazole  Injectable 40 milliGRAM(s) IV Push every 12 hours  propofol Infusion 20 MICROgram(s)/kG/Min (7.39 mL/Hr) IV Continuous <Continuous>  propranolol 40 milliGRAM(s) Oral every 8 hours  sertraline 100 milliGRAM(s) Oral daily  sodium chloride 0.9%. 1000 milliLiter(s) (10 mL/Hr) IV Continuous <Continuous>  thiamine 100 milliGRAM(s) Oral daily  vasopressin Infusion 0.067 Unit(s)/Min (4 mL/Hr) IV Continuous <Continuous>    MEDICATIONS  (PRN):  ondansetron Injectable 4 milliGRAM(s) IV Push every 6 hours PRN Nausea and/or Vomiting  simethicone 80 milliGRAM(s) Chew every 8 hours PRN Gas  sodium chloride 0.9% lock flush 10 milliLiter(s) IV Push every 1 hour PRN Pre/post blood products, medications, blood draw, and to maintain line patency        SURGERY PROGRESS NOTE    SUBJECTIVE / 24H EVENTS:  Patient seen and examined on morning rounds. S/p diagnostic mesenteric angiogram yesterday, noted SMA stenosis at origin, but unable to successfully advance stent into SMA. 3u pRBC intraop. No acute events or further transfusion requirements overnight. H/H stable this morning. Patient reporting continued abdominal pain this morning, denies back pain.     OBJECTIVE:  VITAL SIGNS:  T(C): 36.5 (21 @ 12:00), Max: 36.6 (21 @ 08:00)  HR: 83 (21 @ 13:00) (69 - 84)  BP: --  RR: 17 (21 @ 13:00) (4 - 42)  SpO2: 100% (21 @ 13:00) (100% - 100%)  Daily Height in cm: 177.8 (10 Sep 2021 13:30)    Daily Weight in k.5 (11 Sep 2021 00:00)  POCT Blood Glucose.: 138 mg/dL (21 @ 11:56)  POCT Blood Glucose.: 116 mg/dL (21 @ 05:14)  POCT Blood Glucose.: 177 mg/dL (21 @ 00:24)      PHYSICAL EXAM:  Gen: NAD  LS: Respirations unlabored on RA  GI: Soft. Nondistended. Tender to palpation in bilateral lower quadrants > upper quadrants. No rebound tenderness or guarding  Groin: L groin dressing with single spot of old serosanguineous saturation. Mildly tender to palpation but no swelling, ecchymosis or palpable mass. Palpable femoral pulse  Ext: Warm, well perfused        09-10-21 @ 07:01  -  21 @ 07:00  --------------------------------------------------------  IN:    Albumin 5%  - 250 mL: 250 mL    dextrose 5% + sodium chloride 0.45%: 400 mL    Propofol: 167 mL    Vasopressin: 24 mL  Total IN: 841 mL    OUT:    Drain (mL): 250 mL    Indwelling Catheter - Urethral (mL): 475 mL    Miscellaneous Tube Feedin mL    Stool (mL): 1 mL    Stool (mL): 1 mL    Voided (mL): 100 mL    Voided (mL): 100 mL  Total OUT: 927 mL    Total NET: -86 mL      21 @ 07:01  -  21 @ 13:15  --------------------------------------------------------  IN:    dextrose 5% + sodium chloride 0.45%: 280 mL    Miscellaneous Tube Feedin mL    sodium chloride 0.9%: 70 mL  Total IN: 400 mL    OUT:    Drain (mL): 100 mL    Indwelling Catheter - Urethral (mL): 240 mL    Propofol: 0 mL    Vasopressin: 0 mL  Total OUT: 340 mL    Total NET: 60 mL          LAB VALUES:      133<L>  |  98  |  16  ----------------------------<  165<H>  4.0   |  23  |  0.47<L>    Ca    8.9      11 Sep 2021 00:53  Phos  3.5       Mg     1.6         TPro  6.0  /  Alb  2.8<L>  /  TBili  0.6  /  DBili  x   /  AST  42<H>  /  ALT  115<H>  /  AlkPhos  191<H>                                 9.1    13.52 )-----------( 158      ( 11 Sep 2021 00:53 )             27.7     LIVER FUNCTIONS - ( 11 Sep 2021 00:53 )  Alb: 2.8 g/dL / Pro: 6.0 g/dL / ALK PHOS: 191 U/L / ALT: 115 U/L / AST: 42 U/L / GGT: x           PT/INR - ( 10 Sep 2021 20:48 )   PT: 15.2 sec;   INR: 1.28 ratio         PTT - ( 10 Sep 2021 20:48 )  PTT:83.7 sec  ABG - ( 11 Sep 2021 00:27 )  pH, Arterial: 7.35  pH, Blood: x     /  pCO2: 46    /  pO2: 191   / HCO3: 25    / Base Excess: -0.4  /  SaO2: 100.0                     MICROBIOLOGY:    Culture - Sputum (collected 08 Sep 2021 14:58)  Source: .Sputum Sputum  Gram Stain (08 Sep 2021 17:34):    Few Squamous epithelial cells per low power field    Few polymorphonuclear leukocytes per low power field    Rare Gram positive cocci in pairs per oil power field  Final Report (10 Sep 2021 18:27):    Normal Respiratory Bria present        RADIOLOGY:  PACS Image: Image(s) Available (21 @ 01:10)        MEDICATIONS  (STANDING):  chlorhexidine 0.12% Liquid 15 milliLiter(s) Oral Mucosa every 12 hours  chlorhexidine 2% Cloths 1 Application(s) Topical <User Schedule>  chlorhexidine 4% Liquid 1 Application(s) Topical <User Schedule>  dextrose 40% Gel 15 Gram(s) Oral once  dextrose 5% + sodium chloride 0.45%. 1000 milliLiter(s) (40 mL/Hr) IV Continuous <Continuous>  dextrose 50% Injectable 25 Gram(s) IV Push once  dextrose 50% Injectable 12.5 Gram(s) IV Push once  glucagon  Injectable 1 milliGRAM(s) IntraMuscular once  hydrocortisone sodium succinate Injectable 25 milliGRAM(s) IV Push two times a day  insulin lispro (ADMELOG) corrective regimen sliding scale   SubCutaneous every 6 hours  insulin NPH human recombinant 6 Unit(s) SubCutaneous <User Schedule>  lidocaine   4% Patch 1 Patch Transdermal daily  methimazole 20 milliGRAM(s) Oral <User Schedule>  metoclopramide Injectable 10 milliGRAM(s) IV Push every 8 hours  mirtazapine 7.5 milliGRAM(s) Oral daily  multivitamin 1 Tablet(s) Oral daily  pantoprazole  Injectable 40 milliGRAM(s) IV Push every 12 hours  propofol Infusion 20 MICROgram(s)/kG/Min (7.39 mL/Hr) IV Continuous <Continuous>  propranolol 40 milliGRAM(s) Oral every 8 hours  sertraline 100 milliGRAM(s) Oral daily  sodium chloride 0.9%. 1000 milliLiter(s) (10 mL/Hr) IV Continuous <Continuous>  thiamine 100 milliGRAM(s) Oral daily  vasopressin Infusion 0.067 Unit(s)/Min (4 mL/Hr) IV Continuous <Continuous>    MEDICATIONS  (PRN):  ondansetron Injectable 4 milliGRAM(s) IV Push every 6 hours PRN Nausea and/or Vomiting  simethicone 80 milliGRAM(s) Chew every 8 hours PRN Gas  sodium chloride 0.9% lock flush 10 milliLiter(s) IV Push every 1 hour PRN Pre/post blood products, medications, blood draw, and to maintain line patency

## 2021-09-11 NOTE — PROGRESS NOTE ADULT - SUBJECTIVE AND OBJECTIVE BOX
RICKY HAMPTON  MRN-[id number]  Patient is a 66y old  Male who presents with a chief complaint of Anemia, Supratherapeutic INR, Dark Stools (10 Sep 2021 20:22)    HPI:  65M PMH ACC/AHA stage D HF due to NICM HM2 LVAD , TV annuloplasty ring 17 as destination therapy due to severe peripheral artery disease with significant stenosis  SIADH, Depression, CKD-3 with hyperkalemia, past E. coli UTIs, driveline drainage (21) and COVID-19 (back in 2020)  He was recently seen in clinic where he complained of abdominal pain and dark stools w constipation back in May. He presents to [facility] ER today weakness and fatigue, moderate and + Black stools for three days, on coumadin secondary to warfarin use in the setting of an LVAD. Patient has required transfusions for GIB in the past. Mostly recently back in 2021 pt had anemia with dark stools. No interventions was done at that time. However Last Endoscopy was done in 2020 (negative). Today labs show patient is anemic with H/H of 4.5/16.3,. INR is 8.84 MAP in the 90s, Temp 35.1. He denies any chest pain, shortness of breath, dizziness, abd pain, nausea or vomiting.       (2021 16:57)      Surgery/Hospital course:   admit for melena w/ anemia, INR 8.84   6/15 Capsul study (+) for small bowel bleed, balloon endoscopy (old blood in prox ileum); post EGD - septic w/ L opacity, re-intubated for concern for aspiration, TTE (Mod MR, decrease biV w/ interventricular septum boweing towards R)   bronch    +C Diff    CT C/A/P: Fluid filled colon which may be 2/2 rapid transit. Small bilateral pleffs with associates. Compressive atelectasis New ISABELLE & LLL  parenchymal opacities, suspicious for pneumonia. 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    #8 Shiley trach at bedside    LVAD speed increased to 9200   Bronch   TC since . Patient transferred to SDU.    INR today 2.64.  H&H 7.3/24 this AM.  Will repeat CBC at noon, and will send stool guaiac Patient with persistent abdominal tenderness, rate of tube feeds decreased.  No nausea/vomiting.     INR today 2.4. H&H 9.1/28.6 low flow overnight /N&V, refusing Tube feeds on D5 1/2 normal  @50 cc/hr   INR 2.69  H&H 7.7/.1 refusing Tube feeds on D5 1/2 normal  @50 cc/hr. This am + BM Melena Dr Oneill HF  aware- PRBC x1  GI team consulted -  NPO  plan on study in am-  D/w Dr Cadet Patient  to return to CTU for further management; 1PRBCS    Post op INR 2.2 today.  No bleeding. BC + for SM.  Pt is hypotensive requiring pressor and inotropic support.  ID follow up today on Cefepime will follow.   R PTC for PTX    CT C/A/P: sub q emphysema in R chest wall, GGO RUL, small ascites CTH negative; Abd US: GB thickening, pericholecystic fluid     Perchole drain in place continues to drain total output overnight 133.  Fever today 38.8    duplex LE negative    Patient with persistent abdominal pain, refusing tube feeds and medications, Psych consulted   CTA A/P ordered to r/o mesenteric ischemia 2/2 persistent anorexia, nausea, vomiting. Revealed:  Evaluation of the mesenteric vessels is limited by streak artifact from LVAD. There appears to be severe stenosis of the proximal SMA; abdominal mesenteric doppler is recommended for further evaluation. 2.  No small bowel findings to suggest acute mesenteric ischemia. 3.  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  8/15: Cultures resulted BC 1/2 +GPC in clusters, SC enterobacter; mesenteric duplex: borderline stenosis of proximal SMA  : CT C/A/P noncon: Nondular opacities in R lung apex w/cavitation, abd nl  :  Continue current care, treatment of thyrotoxicosis with medications as per endocrine, d/c ABX as per team.    RUQ sono     Today/Overnight:    Vital Signs Last 24 Hrs  T(C): 36.5 (10 Sep 2021 08:00), Max: 36.7 (10 Sep 2021 00:00)  T(F): 97.7 (10 Sep 2021 08:00), Max: 98.1 (10 Sep 2021 04:00)  HR: 78 (10 Sep 2021 13:30) (65 - 86)  BP: --  BP(mean): --  RR: 26 (10 Sep 2021 13:30) (12 - 39)  SpO2: 100% (10 Sep 2021 13:30) (100% - 100%)  ============================I/O===========================  I&O's Detail    09 Sep 2021 07:01  -  10 Sep 2021 07:00  --------------------------------------------------------  IN:    Enteral Tube Flush: 200 mL    Miscellaneous Tube Feedin mL  Total IN: 1080 mL    OUT:    Drain (mL): 495 mL    Voided (mL): 850 mL  Total OUT: 1345 mL    Total NET: -265 mL      10 Sep 2021 07:01  -  10 Sep 2021 20:34  --------------------------------------------------------  IN:  Total IN: 0 mL    OUT:    Drain (mL): 100 mL    Miscellaneous Tube Feedin mL    Stool (mL): 1 mL    Stool (mL): 1 mL    Voided (mL): 100 mL    Voided (mL): 100 mL  Total OUT: 302 mL    Total NET: -302 mL        ============================ LABS =========================                        8.2    9.49  )-----------( 236      ( 10 Sep 2021 00:43 )             27.1     -10    135  |  98  |  14  ----------------------------<  173<H>  4.2   |  27  |  0.44<L>    Ca    9.5      10 Sep 2021 00:43  Phos  3.7     09-10  Mg     1.8     09-10    TPro  7.3  /  Alb  3.1<L>  /  TBili  0.3  /  DBili  x   /  AST  47<H>  /  ALT  171<H>  /  AlkPhos  259<H>  09-10    LIVER FUNCTIONS - ( 10 Sep 2021 00:43 )  Alb: 3.1 g/dL / Pro: 7.3 g/dL / ALK PHOS: 259 U/L / ALT: 171 U/L / AST: 47 U/L / GGT: x           PT/INR - ( 10 Sep 2021 00:43 )   PT: 12.7 sec;   INR: 1.06 ratio         PTT - ( 10 Sep 2021 00:43 )  PTT:30.7 sec  ABG - ( 10 Sep 2021 19:24 )  pH, Arterial: 7.39  pH, Blood: x     /  pCO2: 40    /  pO2: 432   / HCO3: 24    / Base Excess: -0.7  /  SaO2: 100.0               ======================Micro/Rad/Cardio=================  Culture: Reviewed   CXR: Reviewed  Echo: Reviewed  ======================================================  PAST MEDICAL & SURGICAL HISTORY:  CHF (congestive heart failure)    CAD (coronary artery disease)    Depression    Pleural effusion    History of 2019 novel coronavirus disease (COVID-19)  2020    Hemorrhoids    Bleeding hemorrhoids    Peripheral arterial disease    Claudication    BPH with urinary obstruction    ACC/AHA stage D systolic heart failure    Anticoagulation goal of INR 2.0 to 2.5    Falls    Clavicle fracture    CKD (chronic kidney disease), stage III    Iron deficiency anemia    H/O epistaxis    Vertigo    GI bleed    S/P TVR (tricuspid valve repair)    S/P ventricular assist device    S/P endoscopy      ========================ASSESSMENT ================  Stage D Nonischemic Cardiomyopathy, Status Post HM2 on 2017    Cardiogenic shock  Hemodynamic instability   Acute hypoxemic respiratory failure s/p trach   GI bleed , Status Post Enteroscopy   Anemia, in setting of melena   Chronic Kidney Disease  Stress hyperglycemia   C.diff positive on    Hypovolemic shock  Septic shock  Leukocytosis  GB thickening/percholecystic s/p perc choley by IT   SMA stenosis  Serratia/citrobacter pneumonia   Stenotrophomonas pneumonia   Enterobacter pneumonia   Nasuea/vomiting  Deconditioning    Plan:  ====================== NEUROLOGY=====================  continue to monitor neuro status   Deconditioned, PT/Ambulate as tolerated  Wean Propofol to off. Once more awake advance vent weaning and OOB to chair   ==================== RESPIRATORY======================  Acute hypoxemic respiratory failure s/p #8 shiley trach on , continue TC as tolerated (TC since )  Continue close monitoring of respiratory rate and breathing pattern, following of ABG’s with A-line monitoring, continuous pulse oximetry monitoring.   Moderate secretions, continue suctioning prn, pulmonary toileting.   Will consult SLP for a PMV eval    ====================CARDIOVASCULAR==================  Stage D Nonischemic Cardiomyopathy, Status Post HM2 on 2017; LVAD settings 9200, flow 5.2  TTE : reveals at least moderate MR, mild AI, severe global LV syst dysfxn, dec RV fxn   Continue invasive hemodynamic monitoring     ===================HEMATOLOGIC/ONC ===================  Acute blood loss anemia, monitor H&H/Plts    Holding coumadin and ASA given recurrent GI bleeding. Continue to monitor LDH daily.    ===================== RENAL =========================    Holding coumadin and ASA given recurrent GI bleeding. Continue to monitor LDH daily.  Continue monitoring I&Os, BUN/Cr, and urine output   Replete lytes PRN. Keep K> 4 and Mg >2     ==================== GASTROINTESTINAL===================  S/p cholecystostomy tube placed on  with IR   CTA A/P : Evaluation of the mesenteric vessels is limited by streak artifact from LVAD. There appears to be severe stenosis of the proximal SMA; abdominal mesenteric doppler is recommended for further evaluation. 2.  No small bowel findings to suggest acute mesenteric ischemia. 3.  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  Mesenteric duplex on 8/15 borderline stenosis of proximal SMA. Mesenteric angiogram tentatively scheduled for next week.   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   ========================INFECTIOUS DISEASE================  Afebrile, WBC within normal limits  Continue trending WBC and monitoring fever curve   Culture from dawn drain on : NGTD  SCx on  +rare gram positive cocci and gram variable rods  Repeat SCx  +GPC in pairs, will f/u to get patient off isolation   ID following, MD Prater, appreciate recommendations  Patient requires continuous monitoring with bedside rhythm monitoring, pulse oximetry monitoring, and continuous central venous and arterial pressure monitoring; and intermittent blood gas analysis.  Care plan discussed with ICU care team.    Patient remained critical, at risk for life threatening decompensation.   I have spent 35 minutes providing acute care with multiple re-evaluations throughout the evening.

## 2021-09-12 NOTE — PROGRESS NOTE ADULT - SUBJECTIVE AND OBJECTIVE BOX
[facility] NUTRITION SUPPORT--  Attending/ PA FOLLOW UP NOTE      24 hour events/subjective:    TPN originally consulted for nutrition support, however TPN was not indicated as pt was tolerating TFs close to goal - which was temporarily held for mesenteric angio on 9/10. Angio showed stenosis however stent could not be passed successfully. TFs restarted at 20cc/hr yesterday.    PAST HISTORY  --------------------------------------------------------------------------------  No significant changes to PMH, PSH, FHx, SHx, unless otherwise noted    ALLERGIES & MEDICATIONS  --------------------------------------------------------------------------------  Allergies    Amiodarone Hydrochloride (Other (Severe))    Intolerances      Standing Inpatient Medications  chlorhexidine 0.12% Liquid 15 milliLiter(s) Oral Mucosa every 12 hours  chlorhexidine 2% Cloths 1 Application(s) Topical <User Schedule>  dextrose 40% Gel 15 Gram(s) Oral once  dextrose 50% Injectable 25 Gram(s) IV Push once  dextrose 50% Injectable 12.5 Gram(s) IV Push once  glucagon  Injectable 1 milliGRAM(s) IntraMuscular once  hydrocortisone sodium succinate Injectable 25 milliGRAM(s) IV Push two times a day  insulin lispro (ADMELOG) corrective regimen sliding scale   SubCutaneous every 6 hours  insulin NPH human recombinant 6 Unit(s) SubCutaneous <User Schedule>  lidocaine   4% Patch 1 Patch Transdermal daily  methimazole 20 milliGRAM(s) Oral <User Schedule>  metoclopramide Injectable 10 milliGRAM(s) IV Push every 8 hours  mirtazapine 7.5 milliGRAM(s) Oral daily  multivitamin 1 Tablet(s) Oral daily  pantoprazole  Injectable 40 milliGRAM(s) IV Push every 12 hours  propranolol 40 milliGRAM(s) Oral every 8 hours  sertraline 100 milliGRAM(s) Oral daily  sodium chloride 0.9%. 1000 milliLiter(s) IV Continuous <Continuous>  thiamine 100 milliGRAM(s) Oral daily    PRN Inpatient Medications  ondansetron Injectable 4 milliGRAM(s) IV Push every 6 hours PRN  simethicone 80 milliGRAM(s) Chew every 8 hours PRN  sodium chloride 0.9% lock flush 10 milliLiter(s) IV Push every 1 hour PRN      REVIEW OF SYSTEMS  --------------------------------------------------------------------------------  Gen: as per HPI  Skin: No rashes  Head/Eyes/Ears/Mouth: No headache;No sore throat  Respiratory: No dyspnea, cough,   CV: No chest pain, PND, orthopnea  GI: as per HPI  : No increased frequency, dysuria, hematuria, nocturia  MSK: No joint pain/swelling; no back pain; no edema  Neuro: No dizziness/lightheadedness, weakness, seizures, numbness, tingling  Psych: No significant nervousness, anxiety, stress, depression    All other systems were reviewed and are negative, except as noted.      LABS/STUDIES  --------------------------------------------------------------------------------              7.3    8.77  >-----------<  138      [09-12-21 @ 13:19]              23.5     138  |  102  |  12  ----------------------------<  128      [09-12-21 @ 00:18]  4.1   |  26  |  0.48        Ca     9.1     [09-12-21 @ 00:18]      Mg     1.8     [09-12-21 @ 00:18]      Phos  3.4     [09-12-21 @ 00:18]    TPro  6.7  /  Alb  2.9  /  TBili  0.4  /  DBili  x   /  AST  35  /  ALT  94  /  AlkPhos  196  [09-12-21 @ 00:18]    PT/INR: PT 15.2 , INR 1.28       [09-10-21 @ 20:48]  PTT: 83.7       [09-10-21 @ 20:48]          [09-12-21 @ 00:18]    Blood Gas Arterial - Calcium, Ionized: 1.28 mmol/L (09-12-21 @ 00:03)  Blood Gas Arterial - Calcium, Ionized: 1.31 mmol/L (09-11-21 @ 16:28)    Glucose, Serum: 128 mg/dL (09-12-21 @ 00:18)    Prealbumin, Serum: 11 mg/dL (08-16-21 @ 04:01)  Prealbumin, Serum: 10 mg/dL (08-15-21 @ 13:53)          09-11-21 @ 07:01  -  09-12-21 @ 07:00  --------------------------------------------------------  IN: 1335 mL / OUT: 1795 mL / NET: -460 mL    09-12-21 @ 07:01  -  09-12-21 @ 15:18  --------------------------------------------------------  IN: 290 mL / OUT: 290 mL / NET: 0 mL        VITALS/PHYSICAL EXAM  --------------------------------------------------------------------------------  T(C): 37.1 (09-12-21 @ 12:00), Max: 37.3 (09-11-21 @ 20:00)  HR: 77 (09-12-21 @ 15:07) (73 - 92)  BP: --  RR: 20 (09-12-21 @ 15:07) (16 - 26)  SpO2: 100% (09-12-21 @ 15:07) (98% - 100%)  Wt(kg): --    Physical Exam:    Gen: laying in bed in NAD  HEENT: NCAT PERRL +NGT  Neck: trachea midline, no noted JVD  Chest: respirations non labored  Abd: soft non distended  Extrem: WWP no edema noted  Neuro: awake alert appropriate  Skin: warm, no rashes noted    .  .  .

## 2021-09-12 NOTE — PROGRESS NOTE ADULT - PROBLEM SELECTOR PLAN 5
- H/H slightly dropping post angiogram. Required 3u transfusion intraop.   - Consider noncontrast abd CT to rule out retroperitoneal bleed  - Type & screen - H/H slightly dropping post angiogram. Required 3u transfusion intraop.   - Repeat H/H today  - Consider noncontrast abd CT to rule out retroperitoneal bleed  - Type & screen

## 2021-09-12 NOTE — PROGRESS NOTE ADULT - PROBLEM SELECTOR PLAN 3
- Chronic in nature with questionable SMA stenosis on imaging and mesenteric duplex difficult to interpret with continuous flow. Underwent angiogram on 9/11 which showed SMA stenosis. Unable to place stent, needs repeat angiogram next wek.   - Continue to advance feeds as tolerates, goal rate 65 cc/hr  - Discussed cholecystostomy with GI surgery, unlikely to derive symptomatic benefit since perc dawn tube continues to drain. Repeat RUQ shows contracted gallbladder  - Consider repeat noncontrast abdomen CT due to anemia post procedure

## 2021-09-12 NOTE — PROGRESS NOTE ADULT - SUBJECTIVE AND OBJECTIVE BOX
SURGERY PROGRESS NOTE    SUBJECTIVE / 24H EVENTS:  No overnight events. Patient seen and examined on morning rounds. S/p failed diagnostic mesenteric angiogram on friday, noted SMA stenosis at origin, but unable to successfully advance stent into SMA. 3u pRBC intraop. No acute events or further transfusion requirements overnight. H/H downtrending today 7.3 (13:00) from 8.2 (24:30 ) Patient denies abdominal pain this morning, denies back pain.     OBJECTIVE:  Vital Signs Last 24 Hrs  T(C): 37.1 (12 Sep 2021 12:00), Max: 37.3 (11 Sep 2021 20:00)  T(F): 98.8 (12 Sep 2021 12:00), Max: 99.1 (11 Sep 2021 20:00)  HR: 73 (12 Sep 2021 13:00) (73 - 92)  BP: --  BP(mean): --  RR: 21 (12 Sep 2021 13:00) (16 - 26)  SpO2: 100% (12 Sep 2021 13:00) (98% - 100%)    PHYSICAL EXAM:  Gen: NAD  LS: Respirations unlabored on RA  GI: Soft. Nondistended. Mild tender to palpation in bilateral lower quadrants. No rebound tenderness or guarding  Groin: L groin dressing with single spot of old serosanguineous saturation (dressing was changed). Mildly tender to palpation but no swelling, ecchymosis or palpable mass. Palpable femoral pulse  Ext: Warm, well perfused        I&O's Detail    11 Sep 2021 07:01  -  12 Sep 2021 07:00  --------------------------------------------------------  IN:    dextrose 5% + sodium chloride 0.45%: 400 mL    Enteral Tube Flush: 70 mL    Miscellaneous Tube Feedin mL    sodium chloride 0.9%: 240 mL  Total IN: 1335 mL    OUT:    Drain (mL): 450 mL    Indwelling Catheter - Urethral (mL): 1345 mL    Propofol: 0 mL    Vasopressin: 0 mL  Total OUT: 1795 mL    Total NET: -460 mL      12 Sep 2021 07:01  -  12 Sep 2021 13:59  --------------------------------------------------------  IN:    Miscellaneous Tube Feedin mL    sodium chloride 0.9%: 70 mL  Total IN: 320 mL    OUT:    dextrose 5% + sodium chloride 0.45%: 0 mL    Indwelling Catheter - Urethral (mL): 160 mL    Propofol: 0 mL    Vasopressin: 0 mL  Total OUT: 160 mL    Total NET: 160 mL            LABS:                        7.3    8.77  )-----------( 138      ( 12 Sep 2021 13:19 )             23.5         138  |  102  |  12  ----------------------------<  128<H>  4.1   |  26  |  0.48<L>    Ca    9.1      12 Sep 2021 00:18  Phos  3.4       Mg     1.8         TPro  6.7  /  Alb  2.9<L>  /  TBili  0.4  /  DBili  x   /  AST  35  /  ALT  94<H>  /  AlkPhos  196<H>      PT/INR - ( 10 Sep 2021 20:48 )   PT: 15.2 sec;   INR: 1.28 ratio         PTT - ( 10 Sep 2021 20:48 )  PTT:83.7 sec        MICROBIOLOGY:    Culture - Sputum (collected 08 Sep 2021 14:58)  Source: .Sputum Sputum  Gram Stain (08 Sep 2021 17:34):    Few Squamous epithelial cells per low power field    Few polymorphonuclear leukocytes per low power field    Rare Gram positive cocci in pairs per oil power field  Final Report (10 Sep 2021 18:27):    Normal Respiratory Bria present        RADIOLOGY:  PACS Image: Image(s) Available (21 @ 01:10)        MEDICATIONS  (STANDING):  chlorhexidine 0.12% Liquid 15 milliLiter(s) Oral Mucosa every 12 hours  chlorhexidine 2% Cloths 1 Application(s) Topical <User Schedule>  chlorhexidine 4% Liquid 1 Application(s) Topical <User Schedule>  dextrose 40% Gel 15 Gram(s) Oral once  dextrose 5% + sodium chloride 0.45%. 1000 milliLiter(s) (40 mL/Hr) IV Continuous <Continuous>  dextrose 50% Injectable 25 Gram(s) IV Push once  dextrose 50% Injectable 12.5 Gram(s) IV Push once  glucagon  Injectable 1 milliGRAM(s) IntraMuscular once  hydrocortisone sodium succinate Injectable 25 milliGRAM(s) IV Push two times a day  insulin lispro (ADMELOG) corrective regimen sliding scale   SubCutaneous every 6 hours  insulin NPH human recombinant 6 Unit(s) SubCutaneous <User Schedule>  lidocaine   4% Patch 1 Patch Transdermal daily  methimazole 20 milliGRAM(s) Oral <User Schedule>  metoclopramide Injectable 10 milliGRAM(s) IV Push every 8 hours  mirtazapine 7.5 milliGRAM(s) Oral daily  multivitamin 1 Tablet(s) Oral daily  pantoprazole  Injectable 40 milliGRAM(s) IV Push every 12 hours  propofol Infusion 20 MICROgram(s)/kG/Min (7.39 mL/Hr) IV Continuous <Continuous>  propranolol 40 milliGRAM(s) Oral every 8 hours  sertraline 100 milliGRAM(s) Oral daily  sodium chloride 0.9%. 1000 milliLiter(s) (10 mL/Hr) IV Continuous <Continuous>  thiamine 100 milliGRAM(s) Oral daily  vasopressin Infusion 0.067 Unit(s)/Min (4 mL/Hr) IV Continuous <Continuous>    MEDICATIONS  (PRN):  ondansetron Injectable 4 milliGRAM(s) IV Push every 6 hours PRN Nausea and/or Vomiting  simethicone 80 milliGRAM(s) Chew every 8 hours PRN Gas  sodium chloride 0.9% lock flush 10 milliLiter(s) IV Push every 1 hour PRN Pre/post blood products, medications, blood draw, and to maintain line patency

## 2021-09-12 NOTE — PROGRESS NOTE ADULT - ASSESSMENT
67 YO M with a history of stage D NICM s/p HM2 on 9/2017 as DT (due to severe PAD) with TV ring, prior COVID-19 infection 4/2020, recurrent syncope post LVAD s/p ILR, and chronic abdominal pain with prior negative workup who was admitted 6/14/21 with symptomatic anemia with Hgb 4.5 in setting of INR 8.8 without hemodynamic instability and has since had a protracted hospitalization. He was transfused and underwent VCE which showed active bleeding in the mid small bowel but subsequent enteroscopy 6/15 did not reveal any active bleeding. He acutely decompensated after procedure with fever/hypertension, low flow alarms, and pulmonary infiltrate with hypoxia requiring intubation from probable aspiration PNA. He was unable to extubated and has since undergone tracheostomy but tolerating persistent trach collar and nearing ability for decannulation. 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 Short term goal is maintenance of adequate nutrition and eventual trach decannulation.     Underwent mesenteric angiogram on 9/10 with vascular surgery. Found to have SMA stenosis at its origin but unable to place stent. 3u PBCs transfused intraop and required Fem stop placement postop for L pseudoaneurysm. Vascular plans to repeat angiogram/stent next week.

## 2021-09-12 NOTE — PROGRESS NOTE ADULT - ASSESSMENT
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 coli UTIs, driveline drainage (1/7/21) and COVID-19, here initially for GIB prolonged hospital course, s/p tracheostomy, acalculous cholecystitis s/p perc dawn. Patient has persistent intermittent abdominal pain, and was being evaluated for chronic mesenteric ischemia vs SMA syndrome.  8/13 bld cxs positive. TPN was consulted for Protein Calorie Malnutrition, Prealb 11, prior a1c 5.6, tg 141. Mesenteric duplex showied borderline stenosis of prox SMA,  Pt now s/p Mesenteric angiogram 9/10 unable to place stent. Pt had been tolerating feeds but with intermittent abd pain. SLP eval had recommended NPO status.    -Tube feeds- Vital 1.5 previously tolerated at 55cc/hr (goal 65cc/hr) had been on hold for OR for mesenteric angiogram on 9/10. Pt was restarted yesterday and tolerating at 20cc/hr  - Nutriitional assessment. Pt has been tolerating tube feeds close to goal. TPN not indicated for additional nutritional support at this time.  -suspect hyperthyroidism contributing in part to metabolic issues ie. cAMP effects GI motility, hypercalcemia, tachycardia; with some clinical improvement, further management as per endo  -Electrolyte Imbalance Risk. Daily CMP, Mg, Phos, K.  h/o hypomagnesemia- rec replete as per CTU  -h/o abdominal pain/vomiting- as above, Zofran prn for nausea; Reglan for gut motility; Vascular input noted, s/p Mesenteric Angiogram 9/10, unable to pass stent. f/u Vascular recs  -Anemia- rec iron supp when feasible  -Management as per CTU; will remain available as needed    plan discussed with Dr. Soliz and Dr ANNE Tang, agreeable with above    TPN pager [phone], spectra [phone]  M-F 6A-4P, Weekends and holidays 8A-12P

## 2021-09-12 NOTE — PROGRESS NOTE ADULT - PROBLEM SELECTOR PLAN 7
- endocrine recs appreciated  - currently on methimazole, propranolol, and steroids  - Steroid taper as follows:  9/10: Hydrocortisone 50 mg q 12 hrs, stop after 6 doses  9/11, 9/12, 9/13: Hydrocortisone 25 mg q 12 hrs, stop after 6 doses  9/14, 9/15: Hydrocortisone 25 mg daily, stop after 2 doses  Stop steroids thereafter.   - Benefits of performing angiogram/contrast exposure outweigh risks. Continue treatment for hyperTSH and monitor TFTs post contrast exposure.

## 2021-09-12 NOTE — PROGRESS NOTE ADULT - ASSESSMENT
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 coli UTIs, driveline drainage (1/7/21) and COVID-19, here initially for GIB prolonged hospital course, s/p tracheostomy, acalculous cholecystitis s/p perc dawn. Patient has persistent intermittent abdominal pain, CTA showing possible proximal SMA stenosis. CTA poor quality limited by significant streak artifact. Mesenteric duplex velocities without evidence of significant stenosis, however study unreliable in the setting of patient's augmented systolic function given LVAD. Now s/p diagnostic mesenteric angiogram with failed SMA stenting.     PLAN  - Monitor L groin for signs of PSA or hematoma (No signs at the moment)  - Trend H/H.   - Spoke with CTU and adviced getting a CTA  due to H/H downtrending (7.2 this afternoon)  - Continue excellent care per CTICU    Vascular Surgery  [id number]

## 2021-09-12 NOTE — PROGRESS NOTE ADULT - SUBJECTIVE AND OBJECTIVE BOX
RICKY JOINT  MRN#: [id number]  Subjective:  Pulmonary progress  : recurrent Acute hypoxic respiratory Failure ,aspiration pneumonia, NICM  , chart reviewed and H/O obtained radiological and Laboratory study reviewed patient Examined     65M PMH ACC/AHA stage D HF due to NICM HM2 LVAD , TV annuloplasty ring 17 as destination therapy due to severe peripheral artery disease with significant stenosis  SIADH, Depression, CKD-3 with hyperkalemia, past E. coli UTIs, driveline drainage (21) and COVID-19 (back in 2020)  He was recently seen in clinic where he complained of abdominal pain and dark stools w constipation back in May. He presents to [facility] ER today weakness and fatigue, moderate and + Black stools for three days, on coumadin secondary to warfarin use in the setting of an LVAD. Patient has required transfusions for GIB in the past. Mostly recently back in 2021 pt had anemia with dark stools. No interventions was done at that time. However Last Endoscopy was done in 2020 (negative). Today labs show patient is anemic with H/H of 4.5/16.3,. INR is 8.84 MAP in the 90s, Temp 35.1. He denies any chest pain, shortness of breath, dizziness, abd pain, nausea or vomiting. found to have  rectal bleeding underwent endoscopy ,old blood in the proximal ileum ,  develop sepsis with LL opacity given Antibiotics , Extubated , reintubated , Bronchoscopy on Zosyn for LL pneumonia  and Amiodarone S/P TV Annuloplasty , patient remain intubated on full ventilatory support .S/P multiple units of blood transfusion , remain on full ventilatory support on Precedex and propofol , new central IJ line , diarrhea C diff. +ve on po vancomycin and IV Flagyl,  mildly distended belly , fever start on cefepime 2gm q 8 hrs S/P tracheostomy .  new RT Subclavian central line continue on contact  isolation ,still diarrhea on C-diff antibiotics ENT follow up appreciated , trial of C-PAP as tolerated , , copious secretion from trach. site chest x ray left lower lobe pneumonia , tolerating trch. collar 50% FI02 still excessive secretion need pulmonary toilet , off Ancef antibiotic , no more diarrhea back on full support mechanical ventilator , chest x ray show improvement in LLL air space disease, more awake and responsive on tube feeding no more diarrhea , S/P  Ancef for bacteremia no fever ,ID follow up noted ,  no nausea or vomiting or diarrhea still very weak and tired , event note pulled NG tube now replaced , back on tube feeding ,still on po vancomycin , getting PT and OT at bed side , no plan for decannulation for now. , no more diarrhea receiving PT and OPT at bed side , minimal secretion from tracheostomy site , no SOB getting stronger , improve muscle tone patient transfer to monitor bed still on contact isolation for C-Difficel colitis on 50% FI02, NG tube clogged , and change to resume tube feeding still loose stool . H/H drop significantly require blood transfusion , most likely GI bleeding , IV heparin D/C , vomiting 200 cc of creamy color tube feeding on hold no sob, still melena monitor in the CTU possible capsule endoscopy , H/H is stable ., patient develop TR sided  pneumothorax require chest tube placement , RT IJ central line  placed , develop fever shaking chills , blood culture positive for serratia marcescens , start on cefepime .the patient  become hypoxic and hypotensive placed on full ventilatory support and Vasopressin , levophed and Dobutamine ,S/P blood transfusion on meropenem and vancomycin ,   , on and  off pressors , occasional agitation on Precedex .S/P IR cholecystostomy tube drainage placement in the RT upper Quadrant , resume anticoagulation chest x ray noted C-PAP trail lasted only for 2 hrs , new RT SC line and D/C RT IJ line , RT pig tail cathter has been removed , tolerating C-PAP trial placed on trach. collar 50% FI02 GI consultant noted on NG tube feeding as tolerated , develop AF RVR S/P  bolus Amiodarone  back to regular sinus Rhythm , Flat Affect depressed , back on tube feeding Vital AF at 60 cc/ hr .still intermittent abdominal pain , no fever saturation is accepted  back on full ventilatory support , tired and sleepy on round  .start  back on  tube feeding . tolerating it very well , no nausea or vomiting , good 02 saturation on trac-collar on methimazole for hyperthyroidism , no new C/O no plan for decannulation yet , electrolyte blood test is still pending .on respiratory isolation sputum culture is negative , increase WBC  improved on cefepime , sputum positive for enterococcus , condition is the same ,still C/O Abdominal pain , white count is improving , no chest pain or SOB ,  .placed on Reglan 10 mg TID for gastric motility , depressed , withdrawn. on full NG tube diet with Vital , secretion are much improved , went for mesenteric angiogram , unable to stent SMA S/P 3 units of blood transfusion on trach. collar 40% Fi02       (2021 16:57)    PAST MEDICAL & SURGICAL HISTORY:  CHF (congestive heart failure)    CAD (coronary artery disease)  Depression    Pleural effusion    History of 2019 novel coronavirus disease (COVID-19)  2020    Hemorrhoids    Bleeding hemorrhoids    Peripheral arterial disease    Claudication    BPH with urinary obstruction    ACC/AHA stage D systolic heart failure  Anticoagulation goal of INR 2.0 to 2.5    Falls    Clavicle fracture    CKD (chronic kidney disease), stage III    Iron deficiency anemia    H/O epistaxis    Vertigo    GI bleed    S/P TVR (tricuspid valve repair)    S/P ventricular assist device    S/P endoscopy    OBJECTIVE:  ICU Vital Signs Last 24 Hrs  T(C): 38.2 (2021 10:00), Max: 38.5 (2021 12:00)  T(F): 100.8 (2021 10:00), Max: 101.3 (2021 12:00)  HR: 65 (2021 10:00) (61 - 69)  BP: --  BP(mean): --  ABP: 105/67 (2021 10:00) (90/54 - 113/64)  ABP(mean): 77 (2021 10:00) (63 - 77)  RR: 20 (2021 10:00) (19 - 35)  SpO2: 99% (2021 10:00) (96% - 100%)       @ 07:  -   @ 07:00  --------------------------------------------------------  IN: 2693.9 mL / OUT: 1415 mL / NET: 1278.9 mL     @ 07: @ 10:49  --------------------------------------------------------  IN: 420.8 mL / OUT: 115 mL / NET: 305.8 mL  PHYSICAL EXAM:Daily   Elderly male S/P tracheostomy on full ventilatory support  50% FI02 ,  Daily Weight in k.4 (2021 00:00)  HEENT:     + NCAT  + EOMI  - Conjuctival edema   - Icterus   - Thrush   - ETT  + NGT/OGT  Neck:         + FROM  RT IJ  line JVD  - Nodes - Masses + Mid-line trachea + Tracheostomy  Chest:            normal A-P diameter    Lungs:          + CTA   + Rhonchi    - Rales    - Wheezing + Decreased  LT BS   - Dullness R L  Cardiac:       + S1 + S2    + RRR   - Irregular   - S3  - S4    - Murmurs   - Rub   - Hamman’s sign   Abdomen:    + BS  + Soft + Non-tender - Distended - Organomegaly - PEG .cholecystostomy tube in place  Extremities:   - Cyanosis U/L   - Clubbing  U/L  + LE/UE Edema   + Capillary refill    + Pulses   Neuro:        - Awake   -  Alert   - Confused   - Lethargic   + Sedated  + Generalized Weakness  Skin:        - Rashes    - Erythema   + Normal incisions   + IV sites intact          HOSPITAL MEDICATIONS: All medications reviewed and analyzed  MEDICATIONS  (STANDING):  amiodarone    Tablet 200 milliGRAM(s) Oral daily  chlorhexidine 0.12% Liquid 15 milliLiter(s) Oral Mucosa every 12 hours  chlorhexidine 2% Cloths 1 Application(s) Topical <User Schedule>  dexmedetomidine Infusion 0.5 MICROgram(s)/kG/Hr (9.81 mL/Hr) IV Continuous <Continuous>  dextrose 50% Injectable 50 milliLiter(s) IV Push every 15 minutes  heparin  Infusion 400 Unit(s)/Hr (12.5 mL/Hr) IV Continuous <Continuous>  Hydromorphone  Injectable 0.5 milliGRAM(s) IV Push once  insulin lispro (ADMELOG) corrective regimen sliding scale   SubCutaneous every 6 hours  pantoprazole  Injectable 40 milliGRAM(s) IV Push every 12 hours  piperacillin/tazobactam IVPB.. 3.375 Gram(s) IV Intermittent every 8 hours  propofol Infusion 20 MICROgram(s)/kG/Min (9.42 mL/Hr) IV Continuous <Continuous>  sodium chloride 0.9% lock flush 3 milliLiter(s) IV Push every 8 hours  sodium chloride 0.9%. 1000 milliLiter(s) (10 mL/Hr) IV Continuous <Continuous>    MEDICATIONS  (PRN):  acetaminophen    Suspension .. 650 milliGRAM(s) Oral every 6 hours PRN Temp greater or equal to 38C (100.4F)    LABS: All Lab data reviewed and analyzed                         8.2    11.58 )-----------( 155      ( 12 Sep 2021 00:18 )             26.1       138  |  102  |  12  ----------------------------<  128<H>  4.1   |  26  |  0.48<L>    Ca    9.1      12 Sep 2021 00:18  Phos  3.4     -  Mg     1.8         TPro  6.7  /  Alb  2.9<L>  /  TBili  0.4  /  DBili  x   /  AST  35  /  ALT  94<H>  /  AlkPhos  196<H>      Ca    8.9      11 Sep 2021 00:53  Phos  3.5     -  Mg     1.6         TPro  6.0  /  Alb  2.8<L>  /  TBili  0.6  /  DBili  x   /  AST  42<H>  /  ALT  115<H>  /  AlkPhos  191<H>        Ca    9.5      10 Sep 2021 00:43  Phos  3.7     -10  Mg     1.8     09-10    TPro  7.3  /  Alb  3.1<L>  /  TBili  0.3  /  DBili  x   /  AST  47<H>  /  ALT  171<H>  /  AlkPhos  259<H>  09-10                                                                                                                              PTT - ( 2021 04:52 )  PTT:45.2 sec LIVER FUNCTIONS - ( 2021 00:42 )  Alb: 3.4 g/dL / Pro: 6.7 g/dL / ALK PHOS: 213 U/L / ALT: 15 U/L / AST: 24 U/L / GGT: x           RADIOLOGY: - Reviewed and analyzed RT Pig tail cathter  , LVAD HM2, CT scan of abdomen reviewed result noted

## 2021-09-12 NOTE — PROGRESS NOTE ADULT - ASSESSMENT
Assessment and Recommendation:   · Assessment	  Assessment and recommendation :  Recurrent Acute hypoxic respiratory Failure S/P tracheostomy on full ventilatory support  50% FI02,   Acute blood loss anemia S/P multiple  blood transfusion   going for mesenteric angiogram  S/P septic shock off vasopressin , levophed and off  Dobutamine   S/P cholecystostomy tube placement by IR    AF RVR back to regular sinus Rhythm   Non ischemic cardiomyopathy continue ACE inhibitor and B-Blockers   mesenteric ischemia failure to stent SMA   S/P 3 unit of blood transfusion   S/P Septic shock and cardiogenic shock   Stage D systolic heart failure S/P LVAD HM2   MH2 LVAD  with  TV Annuloplasty  Severe peripheral vascular disease   severe hyperglycemia on insulin coverage    Reglan 10 mg for Gastric Motility   hyperthyroidism on Methimazole 10mg TID   critical care polyneuropathy   Anemia of Acute blood Loss   severe protein caloric malnutrition   S/P blood and FFP transfusion   Chronic kidney disease stage III  Depressed and withdrawn   on   NGT feeding on Vital  advanced to 55 cc/ hr   ? decannulation   GI prophylaxis with PPI   Discussed with TCV team

## 2021-09-12 NOTE — PROGRESS NOTE ADULT - SUBJECTIVE AND OBJECTIVE BOX
RICKY HAMPTON  MRN-[id number]  Patient is a 66y old  Male who presents with a chief complaint of Anemia, Supratherapeutic INR, Dark Stools (11 Sep 2021 14:27)    HPI:  65M PMH ACC/AHA stage D HF due to NICM HM2 LVAD , TV annuloplasty ring 17 as destination therapy due to severe peripheral artery disease with significant stenosis  SIADH, Depression, CKD-3 with hyperkalemia, past E. coli UTIs, driveline drainage (21) and COVID-19 (back in 2020)  He was recently seen in clinic where he complained of abdominal pain and dark stools w constipation back in May. He presents to [facility] ER today weakness and fatigue, moderate and + Black stools for three days, on coumadin secondary to warfarin use in the setting of an LVAD. Patient has required transfusions for GIB in the past. Mostly recently back in 2021 pt had anemia with dark stools. No interventions was done at that time. However Last Endoscopy was done in 2020 (negative). Today labs show patient is anemic with H/H of 4.5/16.3,. INR is 8.84 MAP in the 90s, Temp 35.1. He denies any chest pain, shortness of breath, dizziness, abd pain, nausea or vomiting.       (2021 16:57)      Surgery/Hospital Course:   admit for melena w/ anemia, INR 8.84   6/15 Capsul study (+) for small bowel bleed, balloon endoscopy (old blood in prox ileum); post EGD - septic w/ L opacity, re-intubated for concern for aspiration, TTE (Mod MR, decrease biV w/ interventricular septum boweing towards R)   bronch    +C Diff    CT C/A/P: Fluid filled colon which may be 2/2 rapid transit. Small bilateral pleffs with associates. Compressive atelectasis New ISABELLE & LLL  parenchymal opacities, suspicious for pneumonia. 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    #8 Shiley trach at bedside    LVAD speed increased to 9200   Bronch   TC since . Patient transferred to SDU.    INR today 2.64.  H&H 7.3/24 this AM.  Will repeat CBC at noon, and will send stool guaiac Patient with persistent abdominal tenderness, rate of tube feeds decreased.  No nausea/vomiting.     INR today 2.4. H&H 9.1/28.6 low flow overnight /N&V, refusing Tube feeds on D5 1/2 normal  @50 cc/hr   INR 2.69  H&H 7.7/.1 refusing Tube feeds on D5 1/2 normal  @50 cc/hr. This am + BM Melena Dr Oneill HF  aware- PRBC x1  GI team consulted -  NPO  plan on study in am-  D/w Dr Cadet Patient  to return to CTU for further management; 1PRBCS    Post op INR 2.2 today.  No bleeding. BC + for SM.  Pt is hypotensive requiring pressor and inotropic support.  ID follow up today on Cefepime will follow.   R PTC for PTX    CT C/A/P: sub q emphysema in R chest wall, GGO RUL, small ascites CTH negative; Abd US: GB thickening, pericholecystic fluid     Perchole drain in place continues to drain total output overnight 133.  Fever today 38.8    duplex LE negative    Patient with persistent abdominal pain, refusing tube feeds and medications, Psych consulted   CTA A/P ordered to r/o mesenteric ischemia 2/2 persistent anorexia, nausea, vomiting. Revealed:  Evaluation of the mesenteric vessels is limited by streak artifact from LVAD. There appears to be severe stenosis of the proximal SMA; abdominal mesenteric doppler is recommended for further evaluation. 2.  No small bowel findings to suggest acute mesenteric ischemia. 3.  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  8/15: Cultures resulted BC 1/2 +GPC in clusters, SC enterobacter; mesenteric duplex: borderline stenosis of proximal SMA  : CT C/A/P noncon: Nondular opacities in R lung apex w/cavitation, abd nl  :  Continue current care, treatment of thyrotoxicosis with medications as per endocrine, d/c ABX as per team.    RUQ sono: Contracted gallbladder with cholecystostomy tube in place.    Today:    Patient speaks over trach   Gen: No fever, leg pain improved  EYES/ENT: No visual changes;  No vertigo or throat pain   NECK: No pain   RES:  No shortness of breath or Cough  CARD: No chest pain   GI: +abdominal pain, denies nausea at this time  : No dysuria  NEURO: No weakness; + lower extremity pain, improving  SKIN: No itching, rashes     ICU Vital Signs Last 24 Hrs  T(C): 37.2 (12 Sep 2021 08:00), Max: 37.3 (11 Sep 2021 20:00)  T(F): 99 (12 Sep 2021 08:00), Max: 99.1 (11 Sep 2021 20:00)  HR: 84 (12 Sep 2021 09:00) (79 - 92)  BP: --  BP(mean): --  ABP: 91/62 (12 Sep 2021 09:00) (75/54 - 153/114)  ABP(mean): 72 (12 Sep 2021 09:00) (62 - 128)  RR: 19 (12 Sep 2021 09:) (15 - 26)  SpO2: 100% (12 Sep 2021 09:00) (98% - 100%)      Physical Exam:  Gen:  Awake and alert, Sitting in chair in NAD.  Psych: Mood greatly improved but waxes and wanes, more interactive and participating in care. Would like to be more included in bedside rounding.  CNS:  intact, nonfocal, responds to all commands  Neck: no JVD, +TC   RES : course breath sounds, no wheezing, moderate tan secretions able to cough most up on own         CVS: +LVAD hum   Abd: Soft, RUQ tenderness by perc sybil site. Sybil drain with bilious fluid. Positive BS throughout.  Skin: No rash  Ext:  no edema    ============================I/O===========================   I&O's Detail    11 Sep 2021 07:01  -  12 Sep 2021 07:00  --------------------------------------------------------  IN:    dextrose 5% + sodium chloride 0.45%: 400 mL    Enteral Tube Flush: 70 mL    Miscellaneous Tube Feedin mL    sodium chloride 0.9%: 240 mL  Total IN: 1335 mL    OUT:    Drain (mL): 450 mL    Indwelling Catheter - Urethral (mL): 1345 mL    Propofol: 0 mL    Vasopressin: 0 mL  Total OUT: 1795 mL    Total NET: -460 mL      12 Sep 2021 07:01  -  12 Sep 2021 09:34  --------------------------------------------------------  IN:    Miscellaneous Tube Feedin mL    sodium chloride 0.9%: 30 mL  Total IN: 135 mL    OUT:    dextrose 5% + sodium chloride 0.45%: 0 mL    Indwelling Catheter - Urethral (mL): 60 mL    Propofol: 0 mL    Vasopressin: 0 mL  Total OUT: 60 mL    Total NET: 75 mL        ============================ LABS =========================                        8.2    11.58 )-----------( 155      ( 12 Sep 2021 00:18 )             26.1         138  |  102  |  12  ----------------------------<  128<H>  4.1   |  26  |  0.48<L>    Ca    9.1      12 Sep 2021 00:18  Phos  3.4       Mg     1.8         TPro  6.7  /  Alb  2.9<L>  /  TBili  0.4  /  DBili  x   /  AST  35  /  ALT  94<H>  /  AlkPhos  196<H>      LIVER FUNCTIONS - ( 12 Sep 2021 00:18 )  Alb: 2.9 g/dL / Pro: 6.7 g/dL / ALK PHOS: 196 U/L / ALT: 94 U/L / AST: 35 U/L / GGT: x           PT/INR - ( 10 Sep 2021 20:48 )   PT: 15.2 sec;   INR: 1.28 ratio         PTT - ( 10 Sep 2021 20:48 )  PTT:83.7 sec  ABG - ( 12 Sep 2021 00:03 )  pH, Arterial: 7.42  pH, Blood: x     /  pCO2: 45    /  pO2: 149   / HCO3: 29    / Base Excess: 4.2   /  SaO2: 99.9                ======================Micro/Rad/Cardio=================  Culture: Reviewed   CXR: Reviewed  Echo:Reviewed  ======================================================  PAST MEDICAL & SURGICAL HISTORY:  CHF (congestive heart failure)    CAD (coronary artery disease)    Depression    Pleural effusion    History of  novel coronavirus disease (COVID-19)  2020    Hemorrhoids    Bleeding hemorrhoids    Peripheral arterial disease    Claudication    BPH with urinary obstruction    ACC/AHA stage D systolic heart failure    Anticoagulation goal of INR 2.0 to 2.5    Falls    Clavicle fracture    CKD (chronic kidney disease), stage III    Iron deficiency anemia    H/O epistaxis    Vertigo    GI bleed    S/P TVR (tricuspid valve repair)    S/P ventricular assist device    S/P endoscopy      ====================ASSESSMENT ==============  Stage D Nonischemic Cardiomyopathy, Status Post HM2 on 2017    Cardiogenic shock  Hemodynamic instability   Acute hypoxemic respiratory failure s/p trach   GI bleed , Status Post Enteroscopy   Anemia, in setting of melena   Chronic Kidney Disease  Stress hyperglycemia   C.diff positive on    Hypovolemic shock  Septic shock  Leukocytosis  GB thickening/percholecystic s/p perc choley by IT   SMA stenosis  Serratia/citrobacter pneumonia   Stenotrophomonas pneumonia   Enterobacter pneumonia   Nasuea/vomiting  Deconditioning      Plan:  ====================== NEUROLOGY=====================  C/w mirtazapine and sertraline for depression  Deconditioned, PT/Ambulate as tolerated    mirtazapine 7.5 milliGRAM(s) Oral daily  propofol Infusion 20 MICROgram(s)/kG/Min (7.39 mL/Hr) IV Continuous <Continuous>  sertraline 100 milliGRAM(s) Oral daily    ==================== RESPIRATORY======================  Acute hypoxemic respiratory failure s/p #8 shiley trach on , continue TC as tolerated (TC since )  Continue close monitoring of respiratory rate and breathing pattern, following of ABG’s with A-line monitoring, continuous pulse oximetry monitoring.   Moderate secretions, continue suctioning prn, pulmonary toileting.     Mechanical Ventilation:  Mode: standby    ====================CARDIOVASCULAR==================  Stage D Nonischemic Cardiomyopathy, Status Post HM2 on 2017; LVAD settings 9200, flow 5.7   TTE : reveals at least moderate MR, mild AI, severe global LV syst dysfxn, dec RV fxn   Propranolol for rate control of HR 2/2 Hyperthyroidism, titrate as tolerated per Endo  Pressor support with IV Vasopressin   Continue invasive hemodynamic monitoring     propranolol 40 milliGRAM(s) Oral every 8 hours  vasopressin Infusion 0.067 Unit(s)/Min (4 mL/Hr) IV Continuous <Continuous>    ===================HEMATOLOGIC/ONC ===================  Acute blood loss anemia, monitor H&H/Plts    Holding coumadin and ASA given recurrent GI bleeding. Continue to monitor LDH daily.    ===================== RENAL =========================  Continue monitoring urine output, I&OS, BUN/Cr   Euvolemic, even fluid balance, currently off diuretics.    Replete lytes PRN. Keep K> 4 and Mg >2    ==================== GASTROINTESTINAL===================  S/p cholecystostomy tube placed on  with IR   CTA A/P : Evaluation of the mesenteric vessels is limited by streak artifact from LVAD. There appears to be severe stenosis of the proximal SMA; abdominal mesenteric doppler is recommended for further evaluation. 2.  No small bowel findings to suggest acute mesenteric ischemia. 3.  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  Mesenteric duplex on 8/15 borderline stenosis of proximal SMA. Mesenteric angiogram tentatively scheduled for later this week   Pending RUQ US results to reassess gallbladder with perc sybil tube  Vital 1.5 shantelle feeds - Continue increasing tube feeds 5cc/day for goal of 65cc/hr as tolerated  CT Abd/Pel on  without acute process   Reglan for gut motility  Zofran PRN nausea/vomiting     dextrose 5% + sodium chloride 0.45%. 1000 milliLiter(s) (40 mL/Hr) IV Continuous <Continuous>  multivitamin 1 Tablet(s) Oral daily  GI prophylaxis, pantoprazole  Injectable 40 milliGRAM(s) IV Push every 12 hours  simethicone 80 milliGRAM(s) Chew every 8 hours PRN Gas  sodium chloride 0.9% lock flush 10 milliLiter(s) IV Push every 1 hour PRN Pre/post blood products, medications, blood draw, and to maintain line patency  sodium chloride 0.9%. 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 - Free T4 remains elevated   Per endocrine      - Thyroid US when trach is out      - Propanolol as above for optimal T4-->T3 conversion      - c/w Methimazole ---> consider changing to rectal if continues to have emesis for better absorption      - C/w with hydrocortisone    dextrose 40% Gel 15 Gram(s) Oral once  dextrose 50% Injectable 25 Gram(s) IV Push once  dextrose 50% Injectable 12.5 Gram(s) IV Push once  glucagon  Injectable 1 milliGRAM(s) IntraMuscular once  hydrocortisone sodium succinate Injectable 25 milliGRAM(s) IV Push two times a day  insulin lispro (ADMELOG) corrective regimen sliding scale   SubCutaneous every 6 hours  insulin NPH human recombinant 6 Unit(s) SubCutaneous <User Schedule>  methimazole 20 milliGRAM(s) Oral <User Schedule>    ========================INFECTIOUS DISEASE================  Temp 99.0F, WBC downtrending 13.52->11.58    Continue trending WBC and monitoring fever curve   Culture from sybil drain on : NGTD  SCx on  negative  ID following, MD Prater, appreciate recommendations      Patient requires continuous monitoring with bedside rhythm monitoring, pulse ox monitoring, and intermittent blood gas analysis. Care plan discussed with ICU care team. Patient remained critical and at risk for life threatening decompensation.     By signing my name below, I, Agnieszka Cherry, attest that this documentation has been prepared under the direction and in the presence of Jaclyn Mendoza NP   Electronically signed: Romel Shaffer, 21 @ 09:34    I, Jaclyn Mendoza, personally performed the services described in this documentation. all medical record entries made by the romel were at my direction and in my presence. I have reviewed the chart and agree that the record reflects my personal performance and is accurate and complete  Electronically signed: Jaclyn Mendoza NP        RICKY HAMPTON  MRN-[id number]  Patient is a 66y old  Male who presents with a chief complaint of Anemia, Supratherapeutic INR, Dark Stools (11 Sep 2021 14:27)    HPI:  65M PMH ACC/AHA stage D HF due to NICM HM2 LVAD , TV annuloplasty ring 17 as destination therapy due to severe peripheral artery disease with significant stenosis  SIADH, Depression, CKD-3 with hyperkalemia, past E. coli UTIs, driveline drainage (21) and COVID-19 (back in 2020)  He was recently seen in clinic where he complained of abdominal pain and dark stools w constipation back in May. He presents to [facility] ER today weakness and fatigue, moderate and + Black stools for three days, on coumadin secondary to warfarin use in the setting of an LVAD. Patient has required transfusions for GIB in the past. Mostly recently back in 2021 pt had anemia with dark stools. No interventions was done at that time. However Last Endoscopy was done in 2020 (negative). Today labs show patient is anemic with H/H of 4.5/16.3,. INR is 8.84 MAP in the 90s, Temp 35.1. He denies any chest pain, shortness of breath, dizziness, abd pain, nausea or vomiting.       (2021 16:57)      Surgery/Hospital Course:   admit for melena w/ anemia, INR 8.84   6/15 Capsul study (+) for small bowel bleed, balloon endoscopy (old blood in prox ileum); post EGD - septic w/ L opacity, re-intubated for concern for aspiration, TTE (Mod MR, decrease biV w/ interventricular septum boweing towards R)   bronch    +C Diff    CT C/A/P: Fluid filled colon which may be 2/2 rapid transit. Small bilateral pleffs with associates. Compressive atelectasis New ISABELLE & LLL  parenchymal opacities, suspicious for pneumonia. 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    #8 Shiley trach at bedside    LVAD speed increased to 9200   Bronch   TC since . Patient transferred to SDU.    INR today 2.64.  H&H 7.3/24 this AM.  Will repeat CBC at noon, and will send stool guaiac Patient with persistent abdominal tenderness, rate of tube feeds decreased.  No nausea/vomiting.     INR today 2.4. H&H 9.1/.6 low flow overnight /N&V, refusing Tube feeds on D5 1/2 normal  @50 cc/hr   INR 2.69  H&H 7.7/.1 refusing Tube feeds on D5 1/2 normal  @50 cc/hr. This am + BM Melena Dr Oneill HF  aware- PRBC x1  GI team consulted -  NPO  plan on study in am-  D/w Dr Cadet Patient  to return to CTU for further management; 1PRBCS    Post op INR 2.2 today.  No bleeding. BC + for SM.  Pt is hypotensive requiring pressor and inotropic support.  ID follow up today on Cefepime will follow.   R PTC for PTX    CT C/A/P: sub q emphysema in R chest wall, GGO RUL, small ascites CTH negative; Abd US: GB thickening, pericholecystic fluid     Perchole drain in place continues to drain total output overnight 133.  Fever today 38.8    duplex LE negative    Patient with persistent abdominal pain, refusing tube feeds and medications, Psych consulted   CTA A/P ordered to r/o mesenteric ischemia 2/2 persistent anorexia, nausea, vomiting. Revealed:  Evaluation of the mesenteric vessels is limited by streak artifact from LVAD. There appears to be severe stenosis of the proximal SMA; abdominal mesenteric doppler is recommended for further evaluation. 2.  No small bowel findings to suggest acute mesenteric ischemia. 3.  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  8/15: Cultures resulted BC 1/2 +GPC in clusters, SC enterobacter; mesenteric duplex: borderline stenosis of proximal SMA  : CT C/A/P noncon: Nondular opacities in R lung apex w/cavitation, abd nl  :  Continue current care, treatment of thyrotoxicosis with medications as per endocrine, d/c ABX as per team.    RUQ sono: Contracted gallbladder with cholecystostomy tube in place.    Today: Remains on TC, Failed SMA stenting on 9/10, poss repeat for sometime this week.  Continue to trend h/h for l fem psa.     Patient speaks over trach   Gen: No fever, leg pain improved  EYES/ENT: No visual changes;  No vertigo or throat pain   NECK: No pain   RES:  No shortness of breath or Cough  CARD: No chest pain   GI: +abdominal pain, denies nausea at this time  : No dysuria  NEURO: No weakness; + lower extremity pain, improving  SKIN: No itching, rashes     ICU Vital Signs Last 24 Hrs  T(C): 37.2 (12 Sep 2021 08:00), Max: 37.3 (11 Sep 2021 20:00)  T(F): 99 (12 Sep 2021 08:00), Max: 99.1 (11 Sep 2021 20:00)  HR: 84 (12 Sep 2021 09:00) (79 - 92)  BP: --  BP(mean): --  ABP: 91/62 (12 Sep 2021 09:00) (75/54 - 153/114)  ABP(mean): 72 (12 Sep 2021 09:00) (62 - 128)  RR: 19 (12 Sep 2021 09:00) (15 - 26)  SpO2: 100% (12 Sep 2021 09:00) (98% - 100%)      Physical Exam:  Gen:  Awake and alert, Sitting in chair in NAD.  Psych: Mood greatly improved but waxes and wanes, more interactive and participating in care. Would like to be more included in bedside rounding.  CNS:  intact, nonfocal, responds to all commands  Neck: no JVD, +TC   RES : course breath sounds, no wheezing, moderate tan secretions able to cough most up on own         CVS: +LVAD hum   Abd: Soft, RUQ tenderness by perc sybil site. Sybil drain with bilious fluid. Positive BS throughout.  Skin: No rash  Ext:  no edema    ============================I/O===========================   I&O's Detail    11 Sep 2021 07:01  -  12 Sep 2021 07:00  --------------------------------------------------------  IN:    dextrose 5% + sodium chloride 0.45%: 400 mL    Enteral Tube Flush: 70 mL    Miscellaneous Tube Feedin mL    sodium chloride 0.9%: 240 mL  Total IN: 1335 mL    OUT:    Drain (mL): 450 mL    Indwelling Catheter - Urethral (mL): 1345 mL    Propofol: 0 mL    Vasopressin: 0 mL  Total OUT: 1795 mL    Total NET: -460 mL      12 Sep 2021 07:01  -  12 Sep 2021 09:34  --------------------------------------------------------  IN:    Miscellaneous Tube Feedin mL    sodium chloride 0.9%: 30 mL  Total IN: 135 mL    OUT:    dextrose 5% + sodium chloride 0.45%: 0 mL    Indwelling Catheter - Urethral (mL): 60 mL    Propofol: 0 mL    Vasopressin: 0 mL  Total OUT: 60 mL    Total NET: 75 mL        ============================ LABS =========================                        8.2    11.58 )-----------( 155      ( 12 Sep 2021 00:18 )             26.1         138  |  102  |  12  ----------------------------<  128<H>  4.1   |  26  |  0.48<L>    Ca    9.1      12 Sep 2021 00:18  Phos  3.4       Mg     1.8         TPro  6.7  /  Alb  2.9<L>  /  TBili  0.4  /  DBili  x   /  AST  35  /  ALT  94<H>  /  AlkPhos  196<H>      LIVER FUNCTIONS - ( 12 Sep 2021 00:18 )  Alb: 2.9 g/dL / Pro: 6.7 g/dL / ALK PHOS: 196 U/L / ALT: 94 U/L / AST: 35 U/L / GGT: x           PT/INR - ( 10 Sep 2021 20:48 )   PT: 15.2 sec;   INR: 1.28 ratio         PTT - ( 10 Sep 2021 20:48 )  PTT:83.7 sec  ABG - ( 12 Sep 2021 00:03 )  pH, Arterial: 7.42  pH, Blood: x     /  pCO2: 45    /  pO2: 149   / HCO3: 29    / Base Excess: 4.2   /  SaO2: 99.9                ======================Micro/Rad/Cardio=================  Culture: Reviewed   CXR: Reviewed  Echo:Reviewed  ======================================================  PAST MEDICAL & SURGICAL HISTORY:  CHF (congestive heart failure)    CAD (coronary artery disease)    Depression    Pleural effusion    History of 2019 novel coronavirus disease (COVID-19)  2020    Hemorrhoids    Bleeding hemorrhoids    Peripheral arterial disease    Claudication    BPH with urinary obstruction    ACC/AHA stage D systolic heart failure    Anticoagulation goal of INR 2.0 to 2.5    Falls    Clavicle fracture    CKD (chronic kidney disease), stage III    Iron deficiency anemia    H/O epistaxis    Vertigo    GI bleed    S/P TVR (tricuspid valve repair)    S/P ventricular assist device    S/P endoscopy      ====================ASSESSMENT ==============  Stage D Nonischemic Cardiomyopathy, Status Post HM2 on 2017    Cardiogenic shock  Hemodynamic instability   Acute hypoxemic respiratory failure s/p trach   GI bleed , Status Post Enteroscopy   Anemia, in setting of melena   Chronic Kidney Disease  Stress hyperglycemia   C.diff positive on    Hypovolemic shock  Septic shock  Leukocytosis  GB thickening/percholecystic s/p perc choley by IT   SMA stenosis  Serratia/citrobacter pneumonia   Stenotrophomonas pneumonia   Enterobacter pneumonia   Nasuea/vomiting  Deconditioning      Plan:  ====================== NEUROLOGY=====================  C/w mirtazapine and sertraline for depression  Deconditioned, PT/Ambulate as tolerated    mirtazapine 7.5 milliGRAM(s) Oral daily  propofol Infusion 20 MICROgram(s)/kG/Min (7.39 mL/Hr) IV Continuous <Continuous>  sertraline 100 milliGRAM(s) Oral daily    ==================== RESPIRATORY======================  Acute hypoxemic respiratory failure s/p #8 shiley trach on , continue TC as tolerated (TC since )  Continue close monitoring of respiratory rate and breathing pattern, following of ABG’s with A-line monitoring, continuous pulse oximetry monitoring.   Moderate secretions, continue suctioning prn, pulmonary toileting.     Mechanical Ventilation:  Mode: standby    ====================CARDIOVASCULAR==================  Stage D Nonischemic Cardiomyopathy, Status Post HM2 on 2017; LVAD settings 9200, flow 5.7   TTE : reveals at least moderate MR, mild AI, severe global LV syst dysfxn, dec RV fxn   Propranolol for rate control of HR 2/2 Hyperthyroidism, titrate as tolerated per Endo  Continue invasive hemodynamic monitoring     propranolol 40 milliGRAM(s) Oral every 8 hours  vasopressin Infusion 0.067 Unit(s)/Min (4 mL/Hr) IV Continuous <Continuous>    ===================HEMATOLOGIC/ONC ===================  Acute blood loss anemia, monitor H&H/Plts    Holding coumadin and ASA given recurrent GI bleeding. Continue to monitor LDH daily.    ===================== RENAL =========================  Continue monitoring urine output, I&OS, BUN/Cr   Euvolemic, even fluid balance, currently off diuretics.    Replete lytes PRN. Keep K> 4 and Mg >2    ==================== GASTROINTESTINAL===================  S/p cholecystostomy tube placed on  with IR   CTA A/P : Evaluation of the mesenteric vessels is limited by streak artifact from LVAD. There appears to be severe stenosis of the proximal SMA; abdominal mesenteric doppler is recommended for further evaluation. 2.  No small bowel findings to suggest acute mesenteric ischemia. 3.  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  Mesenteric duplex on 8/15 borderline stenosis of proximal SMA. Mesenteric angiogram tentatively scheduled for later this week   Vital 1.5 shantelle feeds - Continue increasing tube feeds 5cc/day for goal of 65cc/hr as tolerated  CT Abd/Pel on  without acute process   Reglan for gut motility  Zofran PRN nausea/vomiting     dextrose 5% + sodium chloride 0.45%. 1000 milliLiter(s) (40 mL/Hr) IV Continuous <Continuous>  multivitamin 1 Tablet(s) Oral daily  GI prophylaxis, pantoprazole  Injectable 40 milliGRAM(s) IV Push every 12 hours  simethicone 80 milliGRAM(s) Chew every 8 hours PRN Gas  sodium chloride 0.9% lock flush 10 milliLiter(s) IV Push every 1 hour PRN Pre/post blood products, medications, blood draw, and to maintain line patency  sodium chloride 0.9%. 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 - Free T4 remains elevated   Per endocrine      - Thyroid US when trach is out      - Propanolol as above for optimal T4-->T3 conversion      - c/w Methimazole ---> consider changing to rectal if continues to have emesis for better absorption      - C/w with hydrocortisone    dextrose 40% Gel 15 Gram(s) Oral once  dextrose 50% Injectable 25 Gram(s) IV Push once  dextrose 50% Injectable 12.5 Gram(s) IV Push once  glucagon  Injectable 1 milliGRAM(s) IntraMuscular once  hydrocortisone sodium succinate Injectable 25 milliGRAM(s) IV Push two times a day  insulin lispro (ADMELOG) corrective regimen sliding scale   SubCutaneous every 6 hours  insulin NPH human recombinant 6 Unit(s) SubCutaneous <User Schedule>  methimazole 20 milliGRAM(s) Oral <User Schedule>    ========================INFECTIOUS DISEASE================  Temp 99.0F, WBC downtrending 13.52->11.58    Continue trending WBC and monitoring fever curve   Culture from sybil drain on : NGTD  SCx on  negative  ID following, MD Prater, appreciate recommendations      Patient requires continuous monitoring with bedside rhythm monitoring, pulse ox monitoring, and intermittent blood gas analysis. Care plan discussed with ICU care team. Patient remained critical and at risk for life threatening decompensation.     By signing my name below, I, Agnieszka Cherry, attest that this documentation has been prepared under the direction and in the presence of Jaclyn Mendoza NP   Electronically signed: Cesia Shaffer, 21 @ 09:34    I, Jaclyn Mendoza, personally performed the services described in this documentation. all medical record entries made by the luisibmel were at my direction and in my presence. I have reviewed the chart and agree that the record reflects my personal performance and is accurate and complete  Electronically signed: Jaclyn Mendoza NP

## 2021-09-12 NOTE — PROGRESS NOTE ADULT - SUBJECTIVE AND OBJECTIVE BOX
Subjective:  No overnight events.  He is off vent support.   As per staff biliary drainage from drain is blood tinged.     Medications:  chlorhexidine 0.12% Liquid 15 milliLiter(s) Oral Mucosa every 12 hours  chlorhexidine 2% Cloths 1 Application(s) Topical <User Schedule>  dextrose 40% Gel 15 Gram(s) Oral once  dextrose 50% Injectable 25 Gram(s) IV Push once  dextrose 50% Injectable 12.5 Gram(s) IV Push once  glucagon  Injectable 1 milliGRAM(s) IntraMuscular once  hydrocortisone sodium succinate Injectable 25 milliGRAM(s) IV Push two times a day  insulin lispro (ADMELOG) corrective regimen sliding scale   SubCutaneous every 6 hours  insulin NPH human recombinant 6 Unit(s) SubCutaneous <User Schedule>  lidocaine   4% Patch 1 Patch Transdermal daily  methimazole 20 milliGRAM(s) Oral <User Schedule>  metoclopramide Injectable 10 milliGRAM(s) IV Push every 8 hours  mirtazapine 7.5 milliGRAM(s) Oral daily  multivitamin 1 Tablet(s) Oral daily  ondansetron Injectable 4 milliGRAM(s) IV Push every 6 hours PRN  pantoprazole  Injectable 40 milliGRAM(s) IV Push every 12 hours  propranolol 40 milliGRAM(s) Oral every 8 hours  sertraline 100 milliGRAM(s) Oral daily  simethicone 80 milliGRAM(s) Chew every 8 hours PRN  sodium chloride 0.9% lock flush 10 milliLiter(s) IV Push every 1 hour PRN  sodium chloride 0.9%. 1000 milliLiter(s) IV Continuous <Continuous>  thiamine 100 milliGRAM(s) Oral daily    Vitals:  T(C): 37.2 (21 @ 08:00), Max: 37.3 (21 @ 20:00)  HR: 78 (21 @ 11:00) (78 - 92)  BP: --  BP(mean): --  RR: 20 (21 @ 11:00) (16 - 26)  SpO2: 100% (21 @ 11:00) (98% - 100%)    Daily     Daily Weight in k.9 (12 Sep 2021 00:00)    Weight (kg): 61.6 (09-10 @ 13:30)    I&O's Summary    11 Sep 2021 07:01  -  12 Sep 2021 07:00  --------------------------------------------------------  IN: 1335 mL / OUT: 1795 mL / NET: -460 mL    12 Sep 2021 07:01  -  12 Sep 2021 11:38  --------------------------------------------------------  IN: 180 mL / OUT: 105 mL / NET: 75 mL        Physical Exam:  Appearance: No Acute Distress  HEENT: JVP non elevated  Cardiovascular: VAD hum  Respiratory: Clear to auscultation bilaterally  Gastrointestinal: mildly distended, tenderness upon palpation especially in LLQ  Skin: no skin lesions  Neurologic: Non-focal  Extremities: No LE edema, warm and well perfused  Psychiatry: Alert    VAD Interrogation  Type: HM 3  No VAD alarms in the past 24h  Speed 9200 rpm  Flow 5.8 lpm  Power 5.9 gallegos  PI 4.9  Assessment of driveline exit: driveline dressing clean/dry/intact    Labs:                        8.2    11.58 )-----------( 155      ( 12 Sep 2021 00:18 )             26.1         138  |  102  |  12  ----------------------------<  128<H>  4.1   |  26  |  0.48<L>    Ca    9.1      12 Sep 2021 00:18  Phos  3.4       Mg     1.8         TPro  6.7  /  Alb  2.9<L>  /  TBili  0.4  /  DBili  x   /  AST  35  /  ALT  94<H>  /  AlkPhos  196<H>        PT/INR - ( 10 Sep 2021 20:48 )   PT: 15.2 sec;   INR: 1.28 ratio         PTT - ( 10 Sep 2021 20:48 )  PTT:83.7 sec          Lactate Dehydrogenase, Serum: 201 U/L ( @ 00:18)  Lactate Dehydrogenase, Serum: 167 U/L ( @ 00:53)  Lactate Dehydrogenase, Serum: 193 U/L (09-10 @ 00:43)

## 2021-09-13 NOTE — PROGRESS NOTE ADULT - ASSESSMENT
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 coli UTIs, driveline drainage (1/7/21) and COVID-19 (back in April 2020)  He was recently seen in clinic where he complained of abdominal pain and dark stools w constipation back in May. He presents to [facility] ER today weakness and fatigue, moderate and + Black stools for three days, on coumadin secondary to warfarin use in the setting of an LVAD. Patient has required transfusions for GIB in the past. Mostly recently back in jan 2021 pt had anemia with dark stools. No interventions was done at that time. However Last Endoscopy was done in July 2020 (negative). Today labs show patient is anemic with H/H of 4.5/16.3,. INR is 8.84 MAP in the 90s,   Returned from endoscopy appearing ill tachypneic with chills with new white out of his left lung      A/p  s/p LVAD placement  admission prolonged following GI bleeding, aspiration event, CDI, VAP, chronic abdominal pain, most recently with retroperitoneal bleeding post attempted stent placement  low grade fevers and leukocytosis- likely related to retroperitoneal bleeding  would send blood cultures x2 sets  monitor clinical signs/sxs  if he decompensates would begin antibiotics with Vanco1 gm/day and Cefepime 1 gm q 8hr    Discussed with CTU    Morris Prater MD  [phone]  After 5pm/weekends [phone]          Morris Prater MD  [phone]  After 5pm/weekends [phone]

## 2021-09-13 NOTE — PROGRESS NOTE ADULT - ASSESSMENT

## 2021-09-13 NOTE — PROGRESS NOTE ADULT - ASSESSMENT
65 YO M with a history of stage D NICM s/p HM2 on 9/2017 as DT (due to severe PAD) with TV ring, prior COVID-19 infection 4/2020, recurrent syncope post LVAD s/p ILR, and chronic abdominal pain with prior negative workup who was admitted 6/14/21 with symptomatic anemia with Hgb 4.5 in setting of INR 8.8 without hemodynamic instability and has since had a protracted hospitalization. He was transfused and underwent VCE which showed active bleeding in the mid small bowel but subsequent enteroscopy 6/15 did not reveal any active bleeding. He acutely decompensated after procedure with fever/hypertension, low flow alarms, and pulmonary infiltrate with hypoxia requiring intubation from probable aspiration PNA. He was unable to extubated and has since undergone tracheostomy but tolerating persistent trach collar and nearing ability for decannulation. 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 Short term goal is maintenance of adequate nutrition and eventual trach decannulation.     Underwent mesenteric angiogram on 9/10 with vascular surgery. Found to have SMA stenosis at its origin but unable to place stent. 3u PBCs transfused intraop and required Fem stop placement postop for L pseudoaneurysm. Vascular plans to repeat angiogram/stent next week.     65 YO M with a history of stage D NICM s/p HM2 on 9/2017 as DT (due to severe PAD) with TV ring, prior COVID-19 infection 4/2020, recurrent syncope post LVAD s/p ILR, and chronic abdominal pain with prior negative workup who was admitted 6/14/21 with symptomatic anemia with Hgb 4.5 in setting of INR 8.8 without hemodynamic instability and has since had a protracted hospitalization. He was transfused and underwent VCE which showed active bleeding in the mid small bowel but subsequent enteroscopy 6/15 did not reveal any active bleeding. He acutely decompensated after procedure with fever/hypertension, low flow alarms, and pulmonary infiltrate with hypoxia requiring intubation from probable aspiration PNA. He was unable to extubated and has since undergone tracheostomy but tolerating persistent trach collar and nearing ability for decannulation. 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 Short term goal is maintenance of adequate nutrition and eventual trach decannulation.     Underwent mesenteric angiogram on 9/10 with vascular surgery. Found to have SMA stenosis at its origin but unable to place stent. Intra-op received 3uPRBC and required fem stop placement for L pseudoaneurysm. Post-op continued to have drop in H/H requiring additional 2u PRBC (last on 9/12), CT angio revealed left-sided retroperitoneal hematoma. Recently he developed worsening leukocytosis and low grade fevers, will be PAN Cx and ID will follow.

## 2021-09-13 NOTE — PROGRESS NOTE ADULT - PROBLEM SELECTOR PLAN 4
- serratia bacteremia and poss acalculous cholecystitis. B/c with gram + cocci and many enterobacter Carbapenem resistant strain and now s/p per dawn drain  - also with enterobacter from sputum culture 8/13  - off antibiotics  - WBC improved - serratia bacteremia and poss acalculous cholecystitis. B/c with gram + cocci and many enterobacter Carbapenem resistant strain and now s/p per dawn drain  - also with enterobacter from sputum culture 8/13  - off antibiotics  - recently developled worsening leuockytosis, currently 16.58 with low grade fevers (tmax 100.2). Please repeat PAN cultures today and discuss case with ID

## 2021-09-13 NOTE — PROGRESS NOTE ADULT - PROBLEM SELECTOR PLAN 3
- Chronic in nature with questionable SMA stenosis on imaging and mesenteric duplex difficult to interpret with continuous flow. Underwent angiogram on 9/11 which showed SMA stenosis. Unable to place stent. Would hold off on repeating angiogram at this time.   - Continue to advance feeds as tolerates, goal rate 65 cc/hr  - Discussed cholecystostomy with GI surgery, unlikely to derive symptomatic benefit since perc dawn tube continues to drain. Repeat RUQ shows contracted gallbladder

## 2021-09-13 NOTE — PROGRESS NOTE ADULT - PROBLEM SELECTOR PLAN 9
- post angiogram on 9/10 was noted to have drop in H/H requiring multiple units of PRBC  - CT Angio from 9/12 read is above

## 2021-09-13 NOTE — PROGRESS NOTE ADULT - ATTENDING COMMENTS
Recovering after vascular complication (pseudoaneurysm and RP bleed) during mesenteric angiogram last week. Still with pain. Tolerating tube feeds. Will discuss with team regarding repeat attempt at SMA stenting--hold off for now.

## 2021-09-13 NOTE — PROGRESS NOTE ADULT - SUBJECTIVE AND OBJECTIVE BOX
INFECTIOUS DISEASES FOLLOW UP-- Anitra Prater  [phone]    This is a follow up note for this  66yMale with  Anemia    Acute cholecystitis  prolonged hospital stay  events over the weekend noted with left retroperitoneal bleed post attempted vascular surgery stent placement        ROS:  CONSTITUTIONAL:  sad, withdrawn today  CARDIOVASCULAR:  No chest pain or palpitations  RESPIRATORY:  No dyspnea  GASTROINTESTINAL:  No nausea, vomiting, diarrhea,  persistent  abdominal pain with some left groin pain  GENITOURINARY:  No dysuria  NEUROLOGIC:  No headache,     Allergies    Amiodarone Hydrochloride (Other (Severe))    Intolerances        ANTIBIOTICS/RELEVANT:  antimicrobials    immunologic:    OTHER:  chlorhexidine 0.12% Liquid 15 milliLiter(s) Oral Mucosa every 12 hours  chlorhexidine 2% Cloths 1 Application(s) Topical <User Schedule>  dextrose 40% Gel 15 Gram(s) Oral once  dextrose 50% Injectable 25 Gram(s) IV Push once  dextrose 50% Injectable 12.5 Gram(s) IV Push once  glucagon  Injectable 1 milliGRAM(s) IntraMuscular once  insulin lispro (ADMELOG) corrective regimen sliding scale   SubCutaneous every 6 hours  insulin NPH human recombinant 4 Unit(s) SubCutaneous <User Schedule>  lidocaine   4% Patch 1 Patch Transdermal daily  methimazole 20 milliGRAM(s) Oral <User Schedule>  metoclopramide Injectable 10 milliGRAM(s) IV Push every 8 hours  mirtazapine 7.5 milliGRAM(s) Oral daily  multivitamin 1 Tablet(s) Oral daily  ondansetron Injectable 4 milliGRAM(s) IV Push every 6 hours PRN  pantoprazole  Injectable 40 milliGRAM(s) IV Push every 12 hours  propranolol 40 milliGRAM(s) Oral every 8 hours  sertraline 100 milliGRAM(s) Oral daily  simethicone 80 milliGRAM(s) Chew every 8 hours PRN  sodium chloride 0.9% lock flush 10 milliLiter(s) IV Push every 1 hour PRN  sodium chloride 0.9%. 1000 milliLiter(s) IV Continuous <Continuous>  thiamine 100 milliGRAM(s) Oral daily      Objective:  Vital Signs Last 24 Hrs  T(C): 37.4 (13 Sep 2021 16:00), Max: 38.3 (13 Sep 2021 12:00)  T(F): 99.3 (13 Sep 2021 16:00), Max: 100.9 (13 Sep 2021 12:00)  HR: 91 (13 Sep 2021 18:00) (81 - 98)  BP: 102/67 (13 Sep 2021 12:15) (102/67 - 102/67)  BP(mean): 80 (13 Sep 2021 12:15) (80 - 80)  RR: 37 (13 Sep 2021 18:00) (17 - 47)  SpO2: 99% (13 Sep 2021 18:00) (99% - 100%)    PHYSICAL EXAM:  Constitutional:no acute distress, frail sad  Eyes:ALONSO, EOMI  Ear/Nose/Throat: no oral lesions, trach few secretions	  Respiratory: audible bilat  Cardiovascular: VAD sounds  Gastrointestinal: VAd driveline palpable dressing CDI  Extremities:no e/e/c right groin with CVC in place left groin with dressing  No Lymphadenopathy  IV sites not inflammed.    LABS:                        9.6    16.58 )-----------( 129      ( 13 Sep 2021 13:15 )             29.7     09-13    134<L>  |  101  |  10  ----------------------------<  132<H>  3.9   |  26  |  0.46<L>    Ca    9.2      13 Sep 2021 01:03  Phos  3.0     09-13  Mg     1.7     09-13    TPro  6.5  /  Alb  2.8<L>  /  TBili  0.6  /  DBili  x   /  AST  33  /  ALT  75<H>  /  AlkPhos  201<H>  09-13          MICROBIOLOGY:            RECENT CULTURES:  09-08 @ 14:58  .Sputum Sputum  --  --  --    Normal Respiratory Bria present  --  09-07 @ 18:32  .Sputum Sputum  --  --  --    Normal Respiratory Bria present  --      RADIOLOGY & ADDITIONAL STUDIES:    < from: Xray Chest 1 View- PORTABLE-Routine (Xray Chest 1 View- PORTABLE-Routine in AM.) (09.13.21 @ 02:53) >  IMPRESSION:    Clear lungs.    < end of copied text >

## 2021-09-13 NOTE — PROGRESS NOTE ADULT - SUBJECTIVE AND OBJECTIVE BOX
SURGERY PROGRESS NOTE    SUBJECTIVE / 24H EVENTS:  Patient seen and examined on morning rounds. Downtrending H/H yesterday s/p 2u pRBC with appropriate rise in Hgb. CTA obtained, evidence of L RP hematoma without evidence of active extravasation. Endorses abdominal pain, denies lower extremity pain, numbness/tingling.    OBJECTIVE:  VITAL SIGNS:  T(C): 38.3 (21 @ 12:00), Max: 38.3 (21 @ 12:00)  HR: 95 (21 @ 13:00) (75 - 109)  BP: 102/67 (21 @ 12:15) (102/67 - 102/67)  RR: 35 (21 @ 13:00) (17 - 47)  SpO2: 100% (21 @ 13:00) (99% - 100%)  Daily     Daily Weight in k.5 (13 Sep 2021 00:00)  POCT Blood Glucose.: 147 mg/dL (21 @ 11:54)  POCT Blood Glucose.: 56 mg/dL (21 @ 11:51)  POCT Blood Glucose.: 78 mg/dL (21 @ 05:31)      PHYSICAL EXAM:  Gen: NAD  LS: Respirations unlabored on RA  GI: Soft. Nondistended. Mild tender to palpation in bilateral lower quadrants and left flank  Groin: L groin dressing c/d/i. Mildly tender to palpation but no swelling, ecchymosis or palpable mass. Palpable femoral pulse  Ext: Warm, well perfused. Sensation grossly intact in bilateral lower extremities      21 @ 07:01  -  21 @ 07:00  --------------------------------------------------------  IN:    Miscellaneous Tube Feedin mL    PRBCs (Packed Red Blood Cells): 600 mL    sodium chloride 0.9%: 240 mL  Total IN: 1610 mL    OUT:    dextrose 5% + sodium chloride 0.45%: 0 mL    Drain (mL): 450 mL    Indwelling Catheter - Urethral (mL): 940 mL    Propofol: 0 mL    Vasopressin: 0 mL  Total OUT: 1390 mL    Total NET: 220 mL      21 @ 07:01  -  21 @ 13:29  --------------------------------------------------------  IN:    Enteral Tube Flush: 90 mL    Miscellaneous Tube Feedin mL    sodium chloride 0.9%: 60 mL  Total IN: 395 mL    OUT:    Indwelling Catheter - Urethral (mL): 205 mL  Total OUT: 205 mL    Total NET: 190 mL          LAB VALUES:      134<L>  |  101  |  10  ----------------------------<  132<H>  3.9   |  26  |  0.46<L>    Ca    9.2      13 Sep 2021 01:03  Phos  3.0       Mg     1.7         TPro  6.5  /  Alb  2.8<L>  /  TBili  0.6  /  DBili  x   /  AST  33  /  ALT  75<H>  /  AlkPhos  201<H>                                 9.6    16.58 )-----------( 129      ( 13 Sep 2021 13:15 )             29.7     LIVER FUNCTIONS - ( 13 Sep 2021 01:03 )  Alb: 2.8 g/dL / Pro: 6.5 g/dL / ALK PHOS: 201 U/L / ALT: 75 U/L / AST: 33 U/L / GGT: x             ABG - ( 13 Sep 2021 00:14 )  pH, Arterial: 7.41  pH, Blood: x     /  pCO2: 48    /  pO2: 137   / HCO3: 30    / Base Excess: 5.0   /  SaO2: 99.1                      MICROBIOLOGY:      RADIOLOGY:  PACS Image: Image(s) Available (21 @ 02:53)  PACS Image: Image(s) Available (21 @ 16:48)        MEDICATIONS  (STANDING):  chlorhexidine 0.12% Liquid 15 milliLiter(s) Oral Mucosa every 12 hours  chlorhexidine 2% Cloths 1 Application(s) Topical <User Schedule>  dextrose 40% Gel 15 Gram(s) Oral once  dextrose 50% Injectable 25 Gram(s) IV Push once  dextrose 50% Injectable 12.5 Gram(s) IV Push once  glucagon  Injectable 1 milliGRAM(s) IntraMuscular once  hydrocortisone sodium succinate Injectable 25 milliGRAM(s) IV Push two times a day  insulin lispro (ADMELOG) corrective regimen sliding scale   SubCutaneous every 6 hours  insulin NPH human recombinant 4 Unit(s) SubCutaneous <User Schedule>  lidocaine   4% Patch 1 Patch Transdermal daily  methimazole 20 milliGRAM(s) Oral <User Schedule>  metoclopramide Injectable 10 milliGRAM(s) IV Push every 8 hours  mirtazapine 7.5 milliGRAM(s) Oral daily  multivitamin 1 Tablet(s) Oral daily  pantoprazole  Injectable 40 milliGRAM(s) IV Push every 12 hours  propranolol 40 milliGRAM(s) Oral every 8 hours  sertraline 100 milliGRAM(s) Oral daily  sodium chloride 0.9%. 1000 milliLiter(s) (10 mL/Hr) IV Continuous <Continuous>  thiamine 100 milliGRAM(s) Oral daily    MEDICATIONS  (PRN):  ondansetron Injectable 4 milliGRAM(s) IV Push every 6 hours PRN Nausea and/or Vomiting  simethicone 80 milliGRAM(s) Chew every 8 hours PRN Gas  sodium chloride 0.9% lock flush 10 milliLiter(s) IV Push every 1 hour PRN Pre/post blood products, medications, blood draw, and to maintain line patency

## 2021-09-13 NOTE — PROGRESS NOTE ADULT - SUBJECTIVE AND OBJECTIVE BOX
Subjective: Patient seen and examined resting in bed. Was given 2uPRBC yesterday.     Medications:  chlorhexidine 0.12% Liquid 15 milliLiter(s) Oral Mucosa every 12 hours  chlorhexidine 2% Cloths 1 Application(s) Topical <User Schedule>  dextrose 40% Gel 15 Gram(s) Oral once  dextrose 50% Injectable 25 Gram(s) IV Push once  dextrose 50% Injectable 12.5 Gram(s) IV Push once  glucagon  Injectable 1 milliGRAM(s) IntraMuscular once  hydrocortisone sodium succinate Injectable 25 milliGRAM(s) IV Push two times a day  insulin lispro (ADMELOG) corrective regimen sliding scale   SubCutaneous every 6 hours  insulin NPH human recombinant 6 Unit(s) SubCutaneous <User Schedule>  lidocaine   4% Patch 1 Patch Transdermal daily  methimazole 20 milliGRAM(s) Oral <User Schedule>  metoclopramide Injectable 10 milliGRAM(s) IV Push every 8 hours  mirtazapine 7.5 milliGRAM(s) Oral daily  multivitamin 1 Tablet(s) Oral daily  ondansetron Injectable 4 milliGRAM(s) IV Push every 6 hours PRN  pantoprazole  Injectable 40 milliGRAM(s) IV Push every 12 hours  propranolol 40 milliGRAM(s) Oral every 8 hours  sertraline 100 milliGRAM(s) Oral daily  simethicone 80 milliGRAM(s) Chew every 8 hours PRN  sodium chloride 0.9% lock flush 10 milliLiter(s) IV Push every 1 hour PRN  sodium chloride 0.9%. 1000 milliLiter(s) IV Continuous <Continuous>  thiamine 100 milliGRAM(s) Oral daily      ICU Vital Signs Last 24 Hrs  T(C): 36.3 (13 Sep 2021 08:00), Max: 37.9 (13 Sep 2021 00:00)  T(F): 97.4 (13 Sep 2021 08:00), Max: 100.2 (13 Sep 2021 00:00)  HR: 94 (13 Sep 2021 08:00) (73 - 109)  BP: --  BP(mean): --  ABP: 89/56 (13 Sep 2021 08:00) (76/59 - 119/74)  ABP(mean): 68 (13 Sep 2021 08:00) (62 - 89)  RR: 20 (13 Sep 2021 08:00) (17 - 26)  SpO2: 99% (13 Sep 2021 08:00) (99% - 100%)    Mode: standby      Weight in k.5 ( @ 00:00)    I&O's Summary    12 Sep 2021 07:01  -  13 Sep 2021 07:00  --------------------------------------------------------  IN: 1610 mL / OUT: 1390 mL / NET: 220 mL        Physical Exam  Appearance: No Acute Distress  HEENT: JVP non elevated  Cardiovascular: VAD hum  Respiratory: Clear to auscultation bilaterally  Gastrointestinal: mildly distended, tenderness upon palpation especially in LLQ  Skin: no skin lesions  Neurologic: Non-focal  Extremities: No LE edema, warm and well perfused  Psychiatry: Alert      LVAD Interrogation  VAD TYPE HM 2   Speed 9200  Flow 5.8  Power 5.9  PI 5.3  Assessment of driveline exit site: driveline dressing c/d/i  Programming changes: no changes made    Labs:                        9.4    13.44 )-----------( 138      ( 13 Sep 2021 01:03 )             29.1         134<L>  |  101  |  10  ----------------------------<  132<H>  3.9   |  26  |  0.46<L>    Ca    9.2      13 Sep 2021 01:03  Phos  3.0       Mg     1.7         TPro  6.5  /  Alb  2.8<L>  /  TBili  0.6  /  DBili  x   /  AST  33  /  ALT  75<H>  /  AlkPhos  201<H>                Lactate Dehydrogenase, Serum: 211 U/L ( @ 01:03)  Lactate Dehydrogenase, Serum: 201 U/L ( @ 00:18)  Lactate Dehydrogenase, Serum: 167 U/L ( @ 00:53)          Imaging Studies     < from: CT Angio Abdomen and Pelvis w/ IV Cont (21 @ 16:48) >  INTERPRETATION:  Streak artifact from IABP limits evaluation. Left retroperitoneal hematoma that extends into the left space of retzius. There is occlusion of the right femoral artery with distal reconstitution from collateral vessels. There is high grade stenosis of the SMA. No overt active extravasation. D/w Dr. Campos.The above findings were discussed with CLAUDIA Olivas at 7:12 PM on 2021. F/u official report.    < end of copied text >

## 2021-09-13 NOTE — PROGRESS NOTE ADULT - PROBLEM SELECTOR PLAN 2
-decrease NPH to 4 units sq q6  -small correctional scale q6  -discussed with RN and covering NP Evelia Beatty MD  Endocrinology Attending  Mondays and Tuesdays 9am-6pm: [phone] (pager)  Other days, night and weekend: [phone] -hypoglycemia at lunch was erroneous as noted by the repeat measure  -decrease NPH to 4 units sq q6  -small correctional scale q6  -discussed with RN and covering NP Evelia Beatty MD  Endocrinology Attending  Mondays and Tuesdays 9am-6pm: [phone] (pager)  Other days, night and weekend: [phone]

## 2021-09-13 NOTE — PROGRESS NOTE ADULT - ASSESSMENT
65 year old male with history of Stage D HF due to NICM on LVAD, TV annuloplasty ring 9/12/17 as destination therapy due to severe peripheral artery disease with significant stenosis  SIADH, Depression, CKD-3 with hyperkalemia, admitted 6/14/21 with symptomatic anemia with Hgb 4.5 in setting of INR 8.8, thereafter with complicated hospital course including VAP, serratia bacteremia with acalculous cholecystitis s/p percutaneous tube, abdominal pain s/p attempted SMA stent on 9/10/21.  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 However, if contrast imaging is required, risks of deferring the imaging need to be weighed against the risks of further contrast load in this setting.   -Recheck only free T4 and total T3 on 9/13/21  -Continue Methimazole to 20 mg daily.   -Alk phos, AST and ALT are elevated and stable range, but etiologies can be multifactorial. Watch for sudden changes in LFTs or biliary labs  -Continue Propranolol 40 mg q 8 hrs. Goal HR 60-80. At goal   -Continues teroid taper as follows;   1. 9/8, 9/9, 9/10: Hydrocortisone 50 mg q 12 hrs, stop after 6 doses  2. 9/11, 9/12, 9/13: Hydrocortisone 25 mg q 12 hrs, stop after 6 doses  3. 9/14, 9/15: Hydrocortisone 25 mg daily, stop after 2 doses  Patient can then stop steroids thereafter    #Steroid induced hyperglycemia   HbA1c 5.4%. FS at goal 100-180  Hypo to 69 noted at 5:30 AM, improved to 210. Patient has been NPO and did not receive NPH but received 2 units of correctional insulin at 12am.   -decrease NPH to 4 units q6 hrs. Hold if NPO  -decrease correctional scale to low dose q6 hrs   -BS testing q6    Discussed with Dr. Tipton and primary team    Sia Aceves MD  Endocrine Fellow  Pager: [phone]/TIMBO [phone]  Consults 9am-5pm: [phone]  After 5pm and weekends: [phone]

## 2021-09-13 NOTE — PROGRESS NOTE ADULT - ASSESSMENT
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 coli UTIs, driveline drainage (1/7/21) and COVID-19, here initially for GIB prolonged hospital course, s/p tracheostomy, acalculous cholecystitis s/p perc dawn. Patient has persistent intermittent abdominal pain, and was being evaluated for chronic mesenteric ischemia vs SMA syndrome.  8/13 bld cxs positive. TPN was consulted for Protein Calorie Malnutrition, Prealb 11, prior a1c 5.6, tg 141. Mesenteric duplex showied borderline stenosis of prox SMA,  Pt now s/p Mesenteric angiogram 9/10 unable to place stent. Pt had been tolerating feeds but with intermittent abd pain. SLP eval had recommended NPO status. Course c/b left rph    -cont tube feeds and advance to goal as tolerated as per ctu (Vital 1.5 goal 65cc/hr); no plans for TPN at this time  -suspect hyperthyroidism contributing in part to metabolic issues ie. cAMP effects GI motility, hypercalcemia, tachycardia; improving, management as per endo  -sma stenosis- sp dx mesenteric angiogram by vascular but unable to pass stent, f/u further recs  -rph- cta ap noted, trend cbc, transfuse prn  -h/o abdominal pain/vomiting- as above, Zofran prn for nausea; Reglan for gut motility  -fever- care per primary, trend wbc/fever curve  -electrolyte imbalance risk- monitor and replete elytes as needed  -management as per CTU; will remain available as needed    plan discussed with Dr. Soliz and Dr ANNE Tang, agreeable with above    TPN pager [phone], spectra [phone]  M-F 6A-4P, Weekends and holidays 8A-12P

## 2021-09-13 NOTE — PROGRESS NOTE ADULT - SUBJECTIVE AND OBJECTIVE BOX
[facility] NUTRITION SUPPORT-- FOLLOW UP NOTE  --------------------------------------------------------------------------------    24 hour events/subjective: pt seen and examined. c/o groin pain, denies abd pain. low grade fever overnight. cta ap showing L rph, sp 2u prbc yesterday. tolerating tf at 40cc/hr.    Diet:  Diet, NPO with Tube Feed:   Tube Feeding Modality: Nasogastric  Vital 1.5 Tank (VITAL1.5RTH)  Total Volume for 24 Hours (mL): 1560  Continuous  Starting Tube Feed Rate mL per Hour: 10  Increase Tube Feed Rate by (mL): 5     Every 24 hours  Until Goal Tube Feed Rate (mL per Hour): 65  Tube Feed Duration (in Hours): 24  Tube Feed Start Time: 11:45  Supplement Feeding Modality:  Nasogastric  Probiotic Yogurt/Smoothie Cans or Servings Per Day:  2       Frequency:  Daily (09-10-21 @ 21:18)      PAST HISTORY  --------------------------------------------------------------------------------  No significant changes to PMH, PSH, FHx, SHx, unless otherwise noted    ALLERGIES & MEDICATIONS  --------------------------------------------------------------------------------  Allergies    Amiodarone Hydrochloride (Other (Severe))    Intolerances      Standing Inpatient Medications  chlorhexidine 0.12% Liquid 15 milliLiter(s) Oral Mucosa every 12 hours  chlorhexidine 2% Cloths 1 Application(s) Topical <User Schedule>  dextrose 40% Gel 15 Gram(s) Oral once  dextrose 50% Injectable 25 Gram(s) IV Push once  dextrose 50% Injectable 12.5 Gram(s) IV Push once  glucagon  Injectable 1 milliGRAM(s) IntraMuscular once  hydrocortisone sodium succinate Injectable 25 milliGRAM(s) IV Push two times a day  insulin lispro (ADMELOG) corrective regimen sliding scale   SubCutaneous every 6 hours  insulin NPH human recombinant 6 Unit(s) SubCutaneous <User Schedule>  lidocaine   4% Patch 1 Patch Transdermal daily  methimazole 20 milliGRAM(s) Oral <User Schedule>  metoclopramide Injectable 10 milliGRAM(s) IV Push every 8 hours  mirtazapine 7.5 milliGRAM(s) Oral daily  multivitamin 1 Tablet(s) Oral daily  pantoprazole  Injectable 40 milliGRAM(s) IV Push every 12 hours  propranolol 40 milliGRAM(s) Oral every 8 hours  sertraline 100 milliGRAM(s) Oral daily  sodium chloride 0.9%. 1000 milliLiter(s) IV Continuous <Continuous>  thiamine 100 milliGRAM(s) Oral daily    PRN Inpatient Medications  ondansetron Injectable 4 milliGRAM(s) IV Push every 6 hours PRN  simethicone 80 milliGRAM(s) Chew every 8 hours PRN  sodium chloride 0.9% lock flush 10 milliLiter(s) IV Push every 1 hour PRN        REVIEW OF SYSTEMS  --------------------------------------------------------------------------------  General:  as per hpi  HEENT:  no headaches, no vision changes, no ear pain, no epistaxis, no throat pain   CV:  no chest pain, no palpitations  Resp:  no dyspnea, no cough  GI:  as per hpi  :  no pain, no bleeding, no dysuria  Muscle:  no pain, no weakness  Neuro:  no numbness, no tingling, no memory problems  Psych:  no insomnia, no mood problems, no depression  Endocrine:  no cold/heat intolerance  Skin:  no rash, no edema        VITALS/PHYSICAL EXAM  --------------------------------------------------------------------------------  T(C): 36.3 (21 @ 08:00), Max: 37.9 (21 @ 00:00)  HR: 93 (21 @ 09:02) (73 - 109)  BP: --  RR: 18 (21 @ 09:02) (17 - 44)  SpO2: 100% (21 @ 09:02) (99% - 100%)  Wt(kg): --      21 @ 07:01  -  21 @ 07:00  --------------------------------------------------------  IN: 1610 mL / OUT: 1390 mL / NET: 220 mL    21 @ 07:01  -  21 @ 09:23  --------------------------------------------------------  IN: 30 mL / OUT: 90 mL / NET: -60 mL      I&O's Detail    12 Sep 2021 07:01  -  13 Sep 2021 07:00  --------------------------------------------------------  IN:    Miscellaneous Tube Feedin mL    PRBCs (Packed Red Blood Cells): 600 mL    sodium chloride 0.9%: 240 mL  Total IN: 1610 mL    OUT:    dextrose 5% + sodium chloride 0.45%: 0 mL    Drain (mL): 450 mL    Indwelling Catheter - Urethral (mL): 940 mL    Propofol: 0 mL    Vasopressin: 0 mL  Total OUT: 1390 mL    Total NET: 220 mL      13 Sep 2021 07:01  -  13 Sep 2021 09:23  --------------------------------------------------------  IN:    Enteral Tube Flush: 30 mL  Total IN: 30 mL    OUT:    Indwelling Catheter - Urethral (mL): 90 mL  Total OUT: 90 mL    Total NET: -60 mL          Physical Exam:  Gen: lying in bed +ngt  HEENT: +trach  Chest: respirations non labored  Abd: softly dt +perc c tube  LE: no edema  Neuro: awake alert appropriate        LABS/STUDIES  --------------------------------------------------------------------------------              9.4    13.44 >-----------<  138      [21 @ 01:03]              29.1     134  |  101  |  10  ----------------------------<  132      [21 @ 01:03]  3.9   |  26  |  0.46        Ca     9.2     [21 01:03]      Mg     1.7     [21 01:03]      Phos  3.0     [21 01:03]    TPro  6.5  /  Alb  2.8  /  TBili  0.6  /  DBili  x   /  AST  33  /  ALT  75  /  AlkPhos  201  [21 @ 01:03]              [21 01:03]    Ca ionizedBlood Gas Arterial - Calcium, Ionized: 1.27 mmol/L (21 @ 00:14)  Blood Gas Arterial - Calcium, Ionized: 1.28 mmol/L (21 @ 00:03)    Creatinine Trend:  POC glucoseGlucose, Serum: 132 mg/dL (21 @ 01:03)  CAPILLARY BLOOD GLUCOSE      POCT Blood Glucose.: 78 mg/dL (13 Sep 2021 05:31)  POCT Blood Glucose.: 134 mg/dL (12 Sep 2021 23:51)  POCT Blood Glucose.: 107 mg/dL (12 Sep 2021 18:42)  POCT Blood Glucose.: 87 mg/dL (12 Sep 2021 17:48)  POCT Blood Glucose.: 70 mg/dL (12 Sep 2021 17:20)  POCT Blood Glucose.: 68 mg/dL (12 Sep 2021 17:19)  POCT Blood Glucose.: 154 mg/dL (12 Sep 2021 11:39)    PrealbuminPrealbumin, Serum: 11 mg/dL (21 @ 04:01)  Prealbumin, Serum: 10 mg/dL (08-15-21 @ 13:53)    Triglycerides

## 2021-09-13 NOTE — PROGRESS NOTE ADULT - SUBJECTIVE AND OBJECTIVE BOX
Chief Complaint/Follow-up on:     Subjective:    MEDICATIONS  (STANDING):  chlorhexidine 0.12% Liquid 15 milliLiter(s) Oral Mucosa every 12 hours  chlorhexidine 2% Cloths 1 Application(s) Topical <User Schedule>  dextrose 40% Gel 15 Gram(s) Oral once  dextrose 50% Injectable 25 Gram(s) IV Push once  dextrose 50% Injectable 12.5 Gram(s) IV Push once  glucagon  Injectable 1 milliGRAM(s) IntraMuscular once  hydrocortisone sodium succinate Injectable 25 milliGRAM(s) IV Push two times a day  insulin lispro (ADMELOG) corrective regimen sliding scale   SubCutaneous every 6 hours  insulin NPH human recombinant 4 Unit(s) SubCutaneous <User Schedule>  lidocaine   4% Patch 1 Patch Transdermal daily  methimazole 20 milliGRAM(s) Oral <User Schedule>  metoclopramide Injectable 10 milliGRAM(s) IV Push every 8 hours  mirtazapine 7.5 milliGRAM(s) Oral daily  multivitamin 1 Tablet(s) Oral daily  pantoprazole  Injectable 40 milliGRAM(s) IV Push every 12 hours  propranolol 40 milliGRAM(s) Oral every 8 hours  sertraline 100 milliGRAM(s) Oral daily  sodium chloride 0.9%. 1000 milliLiter(s) (10 mL/Hr) IV Continuous <Continuous>  thiamine 100 milliGRAM(s) Oral daily    MEDICATIONS  (PRN):  ondansetron Injectable 4 milliGRAM(s) IV Push every 6 hours PRN Nausea and/or Vomiting  simethicone 80 milliGRAM(s) Chew every 8 hours PRN Gas  sodium chloride 0.9% lock flush 10 milliLiter(s) IV Push every 1 hour PRN Pre/post blood products, medications, blood draw, and to maintain line patency      PHYSICAL EXAM:  VITALS: T(C): 38.3 (09-13-21 @ 12:00)  T(F): 100.9 (09-13-21 @ 12:00), Max: 100.9 (09-13-21 @ 12:00)  HR: 95 (09-13-21 @ 13:00) (75 - 109)  BP: 102/67 (09-13-21 @ 12:15) (102/67 - 102/67)  RR:  (17 - 47)  SpO2:  (99% - 100%)  Wt(kg): --  GENERAL: NAD, well-groomed, well-developed  EYES: No proptosis, no injection  HEENT:  Atraumatic, Normocephalic, moist mucous membranes  THYROID: Normal size, no palpable nodules  RESPIRATORY: Clear to auscultation bilaterally; No rales, rhonchi, wheezing, or rubs  CARDIOVASCULAR: Regular rate and rhythm; No murmurs; no peripheral edema  GI: Soft, nontender, non distended, normal bowel sounds  CUSHING'S SIGNS: no striae    POCT Blood Glucose.: 147 mg/dL (09-13-21 @ 11:54)  POCT Blood Glucose.: 56 mg/dL (09-13-21 @ 11:51)  POCT Blood Glucose.: 78 mg/dL (09-13-21 @ 05:31)  POCT Blood Glucose.: 134 mg/dL (09-12-21 @ 23:51)  POCT Blood Glucose.: 107 mg/dL (09-12-21 @ 18:42)  POCT Blood Glucose.: 87 mg/dL (09-12-21 @ 17:48)  POCT Blood Glucose.: 70 mg/dL (09-12-21 @ 17:20)  POCT Blood Glucose.: 68 mg/dL (09-12-21 @ 17:19)  POCT Blood Glucose.: 154 mg/dL (09-12-21 @ 11:39)  POCT Blood Glucose.: 137 mg/dL (09-12-21 @ 05:32)  POCT Blood Glucose.: 132 mg/dL (09-11-21 @ 23:58)  POCT Blood Glucose.: 129 mg/dL (09-11-21 @ 17:11)  POCT Blood Glucose.: 138 mg/dL (09-11-21 @ 11:56)  POCT Blood Glucose.: 116 mg/dL (09-11-21 @ 05:14)  POCT Blood Glucose.: 177 mg/dL (09-11-21 @ 00:24)    09-13    134<L>  |  101  |  10  ----------------------------<  132<H>  3.9   |  26  |  0.46<L>    EGFR if : 136  EGFR if non : 118    Ca    9.2      09-13  Mg     1.7     09-13  Phos  3.0     09-13    TPro  6.5  /  Alb  2.8<L>  /  TBili  0.6  /  DBili  x   /  AST  33  /  ALT  75<H>  /  AlkPhos  201<H>  09-13          Thyroid Function Tests:  09-09 @ 10:34 TSH -- FreeT4 5.0 T3 110 Anti TPO -- Anti Thyroglobulin Ab -- TSI --  09-07 @ 05:43 TSH <0.01 FreeT4 5.3 T3 126 Anti TPO -- Anti Thyroglobulin Ab -- TSI --                           Chief Complaint/Follow-up on: thyroid and steroid-induced hyperglycemia  Subjective: Pt refused to speak with me or let me examine him. Per the RN, he is upset after the RP bleed after his SMA stent on Friday. They offered to take him outside but he refused that as well.    MEDICATIONS  (STANDING):  chlorhexidine 0.12% Liquid 15 milliLiter(s) Oral Mucosa every 12 hours  chlorhexidine 2% Cloths 1 Application(s) Topical <User Schedule>  dextrose 40% Gel 15 Gram(s) Oral once  dextrose 50% Injectable 25 Gram(s) IV Push once  dextrose 50% Injectable 12.5 Gram(s) IV Push once  glucagon  Injectable 1 milliGRAM(s) IntraMuscular once  hydrocortisone sodium succinate Injectable 25 milliGRAM(s) IV Push two times a day  insulin lispro (ADMELOG) corrective regimen sliding scale   SubCutaneous every 6 hours  insulin NPH human recombinant 4 Unit(s) SubCutaneous <User Schedule>  lidocaine   4% Patch 1 Patch Transdermal daily  methimazole 20 milliGRAM(s) Oral <User Schedule>  metoclopramide Injectable 10 milliGRAM(s) IV Push every 8 hours  mirtazapine 7.5 milliGRAM(s) Oral daily  multivitamin 1 Tablet(s) Oral daily  pantoprazole  Injectable 40 milliGRAM(s) IV Push every 12 hours  propranolol 40 milliGRAM(s) Oral every 8 hours  sertraline 100 milliGRAM(s) Oral daily  sodium chloride 0.9%. 1000 milliLiter(s) (10 mL/Hr) IV Continuous <Continuous>  thiamine 100 milliGRAM(s) Oral daily    MEDICATIONS  (PRN):  ondansetron Injectable 4 milliGRAM(s) IV Push every 6 hours PRN Nausea and/or Vomiting  simethicone 80 milliGRAM(s) Chew every 8 hours PRN Gas  sodium chloride 0.9% lock flush 10 milliLiter(s) IV Push every 1 hour PRN Pre/post blood products, medications, blood draw, and to maintain line patency      PHYSICAL EXAM:  VITALS: T(C): 38.3 (09-13-21 @ 12:00)  T(F): 100.9 (09-13-21 @ 12:00), Max: 100.9 (09-13-21 @ 12:00)  HR: 95 (09-13-21 @ 13:00) (75 - 109)  BP: 102/67 (09-13-21 @ 12:15) (102/67 - 102/67)  RR:  (17 - 47)  SpO2:  (99% - 100%)  Wt(kg): --  GENERAL: uncooperative with exam and speaking to me    POCT Blood Glucose.: 147 mg/dL (09-13-21 @ 11:54)  POCT Blood Glucose.: 56 mg/dL (09-13-21 @ 11:51)  POCT Blood Glucose.: 78 mg/dL (09-13-21 @ 05:31)  POCT Blood Glucose.: 134 mg/dL (09-12-21 @ 23:51)  POCT Blood Glucose.: 107 mg/dL (09-12-21 @ 18:42)  POCT Blood Glucose.: 87 mg/dL (09-12-21 @ 17:48)  POCT Blood Glucose.: 70 mg/dL (09-12-21 @ 17:20)  POCT Blood Glucose.: 68 mg/dL (09-12-21 @ 17:19)  POCT Blood Glucose.: 154 mg/dL (09-12-21 @ 11:39)  POCT Blood Glucose.: 137 mg/dL (09-12-21 @ 05:32)  POCT Blood Glucose.: 132 mg/dL (09-11-21 @ 23:58)  POCT Blood Glucose.: 129 mg/dL (09-11-21 @ 17:11)  POCT Blood Glucose.: 138 mg/dL (09-11-21 @ 11:56)  POCT Blood Glucose.: 116 mg/dL (09-11-21 @ 05:14)  POCT Blood Glucose.: 177 mg/dL (09-11-21 @ 00:24)    09-13    134<L>  |  101  |  10  ----------------------------<  132<H>  3.9   |  26  |  0.46<L>    EGFR if : 136  EGFR if non : 118    Ca    9.2      09-13  Mg     1.7     09-13  Phos  3.0     09-13    TPro  6.5  /  Alb  2.8<L>  /  TBili  0.6  /  DBili  x   /  AST  33  /  ALT  75<H>  /  AlkPhos  201<H>  09-13          Thyroid Function Tests:  09-09 @ 10:34  FreeT4 5.0  09-07 @ 05:43 TSH <0.01 FreeT4 5.3 T3 126

## 2021-09-13 NOTE — PROGRESS NOTE ADULT - PROBLEM SELECTOR PLAN 7
- endocrine recs appreciated  - currently on methimazole, propranolol, and steroids  - Steroid taper as follows: 9/13: Hydrocortisone 25 mg q 12 hrs  9/14, 9/15: Hydrocortisone 25 mg daily, stop after 2 doses  Stop steroids thereafter.   - Continue to monitor TFT post angiogram

## 2021-09-13 NOTE — PROGRESS NOTE ADULT - PROBLEM SELECTOR PLAN 1
Prior to initiation of Amiodarone on 6/14, TSH was normal (1.98). Nearly two months later, on 8/7 - TSH was < 0.01  Likely 2/2 amiodarone use - T1 as he has a TPO ab, exacerbated by IV contrast imaging (7/28 and 8/13). Toxic nodule is possible but he has a trach. WIll need to f/u with radiology about his CT chest.  -Check T3 and FT4 tomorrow.  -Continue MMI 20mg po qdaily, monitor LFTS and bilirubin. LFTs have declined to normal.

## 2021-09-13 NOTE — PROGRESS NOTE ADULT - SUBJECTIVE AND OBJECTIVE BOX
RICKY JOINT  MRN#: [id number]  Subjective:  Pulmonary progress  : recurrent Acute hypoxic respiratory Failure ,aspiration pneumonia, NICM  , chart reviewed and H/O obtained radiological and Laboratory study reviewed patient Examined     65M PMH ACC/AHA stage D HF due to NICM HM2 LVAD , TV annuloplasty ring 17 as destination therapy due to severe peripheral artery disease with significant stenosis  SIADH, Depression, CKD-3 with hyperkalemia, past E. coli UTIs, driveline drainage (21) and COVID-19 (back in 2020)  He was recently seen in clinic where he complained of abdominal pain and dark stools w constipation back in May. He presents to [facility] ER today weakness and fatigue, moderate and + Black stools for three days, on coumadin secondary to warfarin use in the setting of an LVAD. Patient has required transfusions for GIB in the past. Mostly recently back in 2021 pt had anemia with dark stools. No interventions was done at that time. However Last Endoscopy was done in 2020 (negative). Today labs show patient is anemic with H/H of 4.5/16.3,. INR is 8.84 MAP in the 90s, Temp 35.1. He denies any chest pain, shortness of breath, dizziness, abd pain, nausea or vomiting. found to have  rectal bleeding underwent endoscopy ,old blood in the proximal ileum ,  develop sepsis with LL opacity given Antibiotics , Extubated , reintubated , Bronchoscopy on Zosyn for LL pneumonia  and Amiodarone S/P TV Annuloplasty , patient remain intubated on full ventilatory support .S/P multiple units of blood transfusion , remain on full ventilatory support on Precedex and propofol , new central IJ line , diarrhea C diff. +ve on po vancomycin and IV Flagyl,  mildly distended belly , fever start on cefepime 2gm q 8 hrs S/P tracheostomy .  new RT Subclavian central line continue on contact  isolation ,still diarrhea on C-diff antibiotics ENT follow up appreciated , trial of C-PAP as tolerated , , copious secretion from trach. site chest x ray left lower lobe pneumonia , tolerating trch. collar 50% FI02 still excessive secretion need pulmonary toilet , off Ancef antibiotic , no more diarrhea back on full support mechanical ventilator , chest x ray show improvement in LLL air space disease, more awake and responsive on tube feeding no more diarrhea , S/P  Ancef for bacteremia no fever ,ID follow up noted ,  no nausea or vomiting or diarrhea still very weak and tired , event note pulled NG tube now replaced , back on tube feeding ,still on po vancomycin , getting PT and OT at bed side , no plan for decannulation for now. , no more diarrhea receiving PT and OPT at bed side , minimal secretion from tracheostomy site , no SOB getting stronger , improve muscle tone patient transfer to monitor bed still on contact isolation for C-Difficel colitis on 50% FI02, NG tube clogged , and change to resume tube feeding still loose stool . H/H drop significantly require blood transfusion , most likely GI bleeding , IV heparin D/C , vomiting 200 cc of creamy color tube feeding on hold no sob, still melena monitor in the CTU possible capsule endoscopy , H/H is stable ., patient develop TR sided  pneumothorax require chest tube placement , RT IJ central line  placed , develop fever shaking chills , blood culture positive for serratia marcescens , start on cefepime .the patient  become hypoxic and hypotensive placed on full ventilatory support and Vasopressin , levophed and Dobutamine ,S/P blood transfusion on meropenem and vancomycin ,   , on and  off pressors , occasional agitation on Precedex .S/P IR cholecystostomy tube drainage placement in the RT upper Quadrant , resume anticoagulation chest x ray noted C-PAP trail lasted only for 2 hrs , new RT SC line and D/C RT IJ line , RT pig tail cathter has been removed , tolerating C-PAP trial placed on trach. collar 50% FI02 GI consultant noted on NG tube feeding as tolerated , develop AF RVR S/P  bolus Amiodarone  back to regular sinus Rhythm , Flat Affect depressed , back on tube feeding Vital AF at 60 cc/ hr .still intermittent abdominal pain , no fever saturation is accepted  back on full ventilatory support , tired and sleepy on round  .start  back on  tube feeding . tolerating it very well , no nausea or vomiting , good 02 saturation on trac-collar on methimazole for hyperthyroidism , no new C/O no plan for decannulation yet , electrolyte blood test is still pending .on respiratory isolation sputum culture is negative , increase WBC  improved on cefepime , sputum positive for enterococcus , condition is the same ,still C/O Abdominal pain , white count is improving , no chest pain or SOB ,  .placed on Reglan 10 mg TID for gastric motility , depressed , withdrawn. on full NG tube diet with Vital , secretion are much improved , went for mesenteric angiogram , unable to stent SMA S/P 3 units of blood transfusion on trach. collar 40% Fi02, anxious to eat discuss possible decannulation,  H/H  are stable CTA of abdomen as per vascular         (2021 16:57)    PAST MEDICAL & SURGICAL HISTORY:  CHF (congestive heart failure)    CAD (coronary artery disease)  Depression    Pleural effusion    History of 2019 novel coronavirus disease (COVID-19)  2020    Hemorrhoids    Bleeding hemorrhoids    Peripheral arterial disease    Claudication    BPH with urinary obstruction    ACC/AHA stage D systolic heart failure  Anticoagulation goal of INR 2.0 to 2.5    Falls    Clavicle fracture    CKD (chronic kidney disease), stage III    Iron deficiency anemia    H/O epistaxis    Vertigo    GI bleed    S/P TVR (tricuspid valve repair)    S/P ventricular assist device    S/P endoscopy    OBJECTIVE:  ICU Vital Signs Last 24 Hrs  T(C): 38.2 (2021 10:00), Max: 38.5 (2021 12:00)  T(F): 100.8 (2021 10:00), Max: 101.3 (2021 12:00)  HR: 65 (2021 10:00) (61 - 69)  BP: --  BP(mean): --  ABP: 105/67 (2021 10:00) (90/54 - 113/64)  ABP(mean): 77 (2021 10:00) (63 - 77)  RR: 20 (2021 10:00) (19 - 35)  SpO2: 99% (2021 10:00) (96% - 100%)       @ 07:  -   @ 07:00  --------------------------------------------------------  IN: 2693.9 mL / OUT: 1415 mL / NET: 1278.9 mL     @ 07:01  -   @ 10:49  --------------------------------------------------------  IN: 420.8 mL / OUT: 115 mL / NET: 305.8 mL  PHYSICAL EXAM:Daily   Elderly male S/P tracheostomy on full ventilatory support  50% FI02 ,  Daily Weight in k.4 (2021 00:00)  HEENT:     + NCAT  + EOMI  - Conjuctival edema   - Icterus   - Thrush   - ETT  + NGT/OGT  Neck:         + FROM  RT IJ  line JVD  - Nodes - Masses + Mid-line trachea + Tracheostomy  Chest:            normal A-P diameter    Lungs:          + CTA   + Rhonchi    - Rales    - Wheezing + Decreased  LT BS   - Dullness R L  Cardiac:       + S1 + S2    + RRR   - Irregular   - S3  - S4    - Murmurs   - Rub   - Hamman’s sign   Abdomen:    + BS  + Soft + Non-tender - Distended - Organomegaly - PEG .cholecystostomy tube in place  Extremities:   - Cyanosis U/L   - Clubbing  U/L  + LE/UE Edema   + Capillary refill    + Pulses   Neuro:        - Awake   -  Alert   - Confused   - Lethargic   + Sedated  + Generalized Weakness  Skin:        - Rashes    - Erythema   + Normal incisions   + IV sites intact          HOSPITAL MEDICATIONS: All medications reviewed and analyzed  MEDICATIONS  (STANDING):  amiodarone    Tablet 200 milliGRAM(s) Oral daily  chlorhexidine 0.12% Liquid 15 milliLiter(s) Oral Mucosa every 12 hours  chlorhexidine 2% Cloths 1 Application(s) Topical <User Schedule>  dexmedetomidine Infusion 0.5 MICROgram(s)/kG/Hr (9.81 mL/Hr) IV Continuous <Continuous>  dextrose 50% Injectable 50 milliLiter(s) IV Push every 15 minutes  heparin  Infusion 400 Unit(s)/Hr (12.5 mL/Hr) IV Continuous <Continuous>  Hydromorphone  Injectable 0.5 milliGRAM(s) IV Push once  insulin lispro (ADMELOG) corrective regimen sliding scale   SubCutaneous every 6 hours  pantoprazole  Injectable 40 milliGRAM(s) IV Push every 12 hours  piperacillin/tazobactam IVPB.. 3.375 Gram(s) IV Intermittent every 8 hours  propofol Infusion 20 MICROgram(s)/kG/Min (9.42 mL/Hr) IV Continuous <Continuous>  sodium chloride 0.9% lock flush 3 milliLiter(s) IV Push every 8 hours  sodium chloride 0.9%. 1000 milliLiter(s) (10 mL/Hr) IV Continuous <Continuous>    MEDICATIONS  (PRN):  acetaminophen    Suspension .. 650 milliGRAM(s) Oral every 6 hours PRN Temp greater or equal to 38C (100.4F)    LABS: All Lab data reviewed and analyzed                         9.4    13.44 )-----------( 138      ( 13 Sep 2021 01:03 )             29.1       134<L>  |  101  |  10  ----------------------------<  132<H>  3.9   |  26  |  0.46<L>    Ca    9.2      13 Sep 2021 01:03  Phos  3.0     -  Mg     1.7         TPro  6.5  /  Alb  2.8<L>  /  TBili  0.6  /  DBili  x   /  AST  33  /  ALT  75<H>  /  AlkPhos  201<H>      Ca    9.1      12 Sep 2021 00:18  Phos  3.4       Mg     1.8         TPro  6.7  /  Alb  2.9<L>  /  TBili  0.4  /  DBili  x   /  AST  35  /  ALT  94<H>  /  AlkPhos  196<H>      Ca    8.9      11 Sep 2021 00:53  Phos  3.5       Mg     1.6         TPro  6.0  /  Alb  2.8<L>  /  TBili  0.6  /  DBili  x   /  AST  42<H>  /  ALT  115<H>  /  AlkPhos  191<H>        Ca    9.5      10 Sep 2021 00:43  Phos  3.7     -10  Mg     1.8     09-10    TPro  7.3  /  Alb  3.1<L>  /  TBili  0.3  /  DBili  x   /  AST  47<H>  /  ALT  171<H>  /  AlkPhos  259<H>  09-10                                                                                                                              PTT - ( 2021 04:52 )  PTT:45.2 sec LIVER FUNCTIONS - ( 2021 00:42 )  Alb: 3.4 g/dL / Pro: 6.7 g/dL / ALK PHOS: 213 U/L / ALT: 15 U/L / AST: 24 U/L / GGT: x           RADIOLOGY: - Reviewed and analyzed RT Pig tail cathter  , LVAD HM2, CT scan of abdomen reviewed result noted

## 2021-09-13 NOTE — PROGRESS NOTE ADULT - SUBJECTIVE AND OBJECTIVE BOX
RICKY HAMPTON  MRN-[id number]  Patient is a 66y old  Male who presents with a chief complaint of Anemia, Supratherapeutic INR, Dark Stools (11 Sep 2021 14:27)    HPI:  65M PMH ACC/AHA stage D HF due to NICM HM2 LVAD , TV annuloplasty ring 17 as destination therapy due to severe peripheral artery disease with significant stenosis  SIADH, Depression, CKD-3 with hyperkalemia, past E. coli UTIs, driveline drainage (21) and COVID-19 (back in 2020)  He was recently seen in clinic where he complained of abdominal pain and dark stools w constipation back in May. He presents to [facility] ER today weakness and fatigue, moderate and + Black stools for three days, on coumadin secondary to warfarin use in the setting of an LVAD. Patient has required transfusions for GIB in the past. Mostly recently back in 2021 pt had anemia with dark stools. No interventions was done at that time. However Last Endoscopy was done in 2020 (negative). Today labs show patient is anemic with H/H of 4.5/16.3,. INR is 8.84 MAP in the 90s, Temp 35.1. He denies any chest pain, shortness of breath, dizziness, abd pain, nausea or vomiting.       (2021 16:57)      Surgery/Hospital Course:   admit for melena w/ anemia, INR 8.84   6/15 Capsul study (+) for small bowel bleed, balloon endoscopy (old blood in prox ileum); post EGD - septic w/ L opacity, re-intubated for concern for aspiration, TTE (Mod MR, decrease biV w/ interventricular septum boweing towards R)   bronch    +C Diff    CT C/A/P: Fluid filled colon which may be 2/2 rapid transit. Small bilateral pleffs with associates. Compressive atelectasis New ISABELLE & LLL  parenchymal opacities, suspicious for pneumonia. 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    #8 Shiley trach at bedside    LVAD speed increased to 9200   Bronch   TC since . Patient transferred to SDU.    INR today 2.64.  H&H 7.3/24 this AM.  Will repeat CBC at noon, and will send stool guaiac Patient with persistent abdominal tenderness, rate of tube feeds decreased.  No nausea/vomiting.     INR today 2.4. H&H 9.1/.6 low flow overnight /N&V, refusing Tube feeds on D5 1/2 normal  @50 cc/hr   INR 2.69  H&H 7.7/.1 refusing Tube feeds on D5 1/2 normal  @50 cc/hr. This am + BM Melena Dr Oneill HF  aware- PRBC x1  GI team consulted -  NPO  plan on study in am-  D/w Dr Cadet Patient  to return to CTU for further management; 1PRBCS    Post op INR 2.2 today.  No bleeding. BC + for SM.  Pt is hypotensive requiring pressor and inotropic support.  ID follow up today on Cefepime will follow.   R PTC for PTX    CT C/A/P: sub q emphysema in R chest wall, GGO RUL, small ascites CTH negative; Abd US: GB thickening, pericholecystic fluid     Perchole drain in place continues to drain total output overnight 133.  Fever today 38.8    duplex LE negative    Patient with persistent abdominal pain, refusing tube feeds and medications, Psych consulted   CTA A/P ordered to r/o mesenteric ischemia 2/2 persistent anorexia, nausea, vomiting. Revealed:  Evaluation of the mesenteric vessels is limited by streak artifact from LVAD. There appears to be severe stenosis of the proximal SMA; abdominal mesenteric doppler is recommended for further evaluation. 2.  No small bowel findings to suggest acute mesenteric ischemia. 3.  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  8/15: Cultures resulted BC 1/2 +GPC in clusters, SC enterobacter; mesenteric duplex: borderline stenosis of proximal SMA  : CT C/A/P noncon: Nondular opacities in R lung apex w/cavitation, abd nl  :  Continue current care, treatment of thyrotoxicosis with medications as per endocrine, d/c ABX as per team.    RUQ sono: Contracted gallbladder with cholecystostomy tube in place.    Today: Remains on TC, Failed SMA stenting on 9/10, poss repeat for sometime this week.  Continue to trend h/h for L RP hematoma    Patient speaks over trach   Gen: No fever, leg pain improved  EYES/ENT: No visual changes;  No vertigo or throat pain   NECK: No pain   RES:  No shortness of breath or Cough  CARD: No chest pain   GI: +abdominal pain, denies nausea at this time  : No dysuria  NEURO: No weakness; + lower extremity pain, improving  SKIN: No itching, rashes     ICU Vital Signs Last 24 Hrs  T(C): 38.3 (13 Sep 2021 12:00), Max: 38.3 (13 Sep 2021 12:00)  T(F): 100.9 (13 Sep 2021 12:00), Max: 100.9 (13 Sep 2021 12:00)  HR: 89 (13 Sep 2021 14:00) (75 - 109)  BP: 102/67 (13 Sep 2021 12:15) (102/67 - 102/67)  BP(mean): 80 (13 Sep 2021 12:15) (80 - 80)  ABP: 103/59 (13 Sep 2021 14:00) (83/55 - 130/77)  ABP(mean): 73 (13 Sep 2021 14:00) (62 - 95)  RR: 30 (13 Sep 2021 14:00) (17 - 47)  SpO2: 100% (13 Sep 2021 14:00) (99% - 100%)    Physical Exam:  Gen:  Awake and alert, Sitting in chair in NAD.  Psych: Mood greatly improved but waxes and wanes, more interactive and participating in care. Would like to be more included in bedside rounding.  CNS:  intact, nonfocal, responds to all commands  Neck: no JVD, +TC   RES : course breath sounds, no wheezing, moderate tan secretions able to cough most up on own         CVS: +LVAD hum   Abd: Soft, RUQ tenderness by perc sybil site. Sybil drain with bilious fluid. Positive BS throughout.  Skin: No rash  Ext:  no edema    ============================I/O===========================   I&O's Detail    I&O's Detail    12 Sep 2021 07:01  -  13 Sep 2021 07:00  --------------------------------------------------------  IN:    Miscellaneous Tube Feedin mL    PRBCs (Packed Red Blood Cells): 600 mL    sodium chloride 0.9%: 240 mL  Total IN: 1610 mL    OUT:    dextrose 5% + sodium chloride 0.45%: 0 mL    Drain (mL): 450 mL    Indwelling Catheter - Urethral (mL): 940 mL    Propofol: 0 mL    Vasopressin: 0 mL  Total OUT: 1390 mL    Total NET: 220 mL      13 Sep 2021 07:01  -  13 Sep 2021 14:37  --------------------------------------------------------  IN:    Enteral Tube Flush: 90 mL    Miscellaneous Tube Feedin mL    sodium chloride 0.9%: 70 mL  Total IN: 450 mL    OUT:    Indwelling Catheter - Urethral (mL): 205 mL  Total OUT: 205 mL    Total NET: 245 mL                            9.6    16.58 )-----------( 129      ( 13 Sep 2021 13:15 )             29.7         134<L>  |  101  |  10  ----------------------------<  132<H>  3.9   |  26  |  0.46<L>    Ca    9.2      13 Sep 2021 01:03  Phos  3.0       Mg     1.7         TPro  6.5  /  Alb  2.8<L>  /  TBili  0.6  /  DBili  x   /  AST  33  /  ALT  75<H>  /  AlkPhos  201<H>        ABG - ( 13 Sep 2021 13:43 )  pH, Arterial: 7.42  pH, Blood: x     /  pCO2: 48    /  pO2: 110   / HCO3: 31    / Base Excess: 5.8   /  SaO2: 99.8        ======================Micro/Rad/Cardio=================  Culture: Reviewed   CXR: Reviewed  Echo:Reviewed  ======================================================  PAST MEDICAL & SURGICAL HISTORY:  CHF (congestive heart failure)    CAD (coronary artery disease)    Depression    Pleural effusion    History of  novel coronavirus disease (COVID-19)  2020    Hemorrhoids    Bleeding hemorrhoids    Peripheral arterial disease    Claudication    BPH with urinary obstruction    ACC/AHA stage D systolic heart failure    Anticoagulation goal of INR 2.0 to 2.5    Falls    Clavicle fracture    CKD (chronic kidney disease), stage III    Iron deficiency anemia    H/O epistaxis    Vertigo    GI bleed    S/P TVR (tricuspid valve repair)    S/P ventricular assist device    S/P endoscopy      ====================ASSESSMENT ==============  Stage D Nonischemic Cardiomyopathy, Status Post HM2 on 2017    Cardiogenic shock  Hemodynamic instability   Acute hypoxemic respiratory failure s/p trach   GI bleed , Status Post Enteroscopy   Anemia, in setting of melena   Chronic Kidney Disease  Stress hyperglycemia   C.diff positive on    Hypovolemic shock  Septic shock  Leukocytosis  GB thickening/percholecystic s/p perc choley by IT   SMA stenosis  Serratia/citrobacter pneumonia   Stenotrophomonas pneumonia   Enterobacter pneumonia   Nasuea/vomiting  Deconditioning      Plan:  ====================== NEUROLOGY=====================  C/w mirtazapine and sertraline for depression  Deconditioned, PT/Ambulate as tolerated    mirtazapine 7.5 milliGRAM(s) Oral daily  propofol Infusion 20 MICROgram(s)/kG/Min (7.39 mL/Hr) IV Continuous <Continuous>  sertraline 100 milliGRAM(s) Oral daily    ==================== RESPIRATORY======================  Acute hypoxemic respiratory failure s/p #8 shiley trach on , continue TC as tolerated (TC since )  Continue close monitoring of respiratory rate and breathing pattern, following of ABG’s with A-line monitoring, continuous pulse oximetry monitoring.   Moderate secretions, continue suctioning prn, pulmonary toileting.     Mechanical Ventilation:  Mode: standby    ====================CARDIOVASCULAR==================  Stage D Nonischemic Cardiomyopathy, Status Post HM2 on 2017; LVAD settings 9200, flow 5.7   TTE : reveals at least moderate MR, mild AI, severe global LV syst dysfxn, dec RV fxn   Propranolol for rate control of HR 2/2 Hyperthyroidism, titrate as tolerated per Endo  Continue invasive hemodynamic monitoring     propranolol 40 milliGRAM(s) Oral every 8 hours    ===================HEMATOLOGIC/ONC ===================  Acute blood loss anemia, monitor H&H/Plts    2 PRBCs yesterday, continue to monitor H/H   Holding coumadin and ASA given recurrent GI bleeding. Continue to monitor LDH daily.    ===================== RENAL =========================  Continue monitoring urine output, I&OS, BUN/Cr   Euvolemic, even fluid balance, currently off diuretics.    Replete lytes PRN. Keep K> 4 and Mg >2    ==================== GASTROINTESTINAL===================  S/p cholecystostomy tube placed on  with IR   CTA A/P : Evaluation of the mesenteric vessels is limited by streak artifact from LVAD. There appears to be severe stenosis of the proximal SMA; abdominal mesenteric doppler is recommended for further evaluation. 2.  No small bowel findings to suggest acute mesenteric ischemia. 3.  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  Mesenteric duplex on 8/15 borderline stenosis of proximal SMA. Mesenteric angiogram tentatively scheduled for later this week   Vital 1.5 shantelle feeds - Continue increasing tube feeds 5cc/day for goal of 65cc/hr as tolerated  CT Abd/Pel on  without acute process   Reglan for gut motility  Zofran PRN nausea/vomiting     dextrose 5% + sodium chloride 0.45%. 1000 milliLiter(s) (40 mL/Hr) IV Continuous <Continuous>  multivitamin 1 Tablet(s) Oral daily  GI prophylaxis, pantoprazole  Injectable 40 milliGRAM(s) IV Push every 12 hours  simethicone 80 milliGRAM(s) Chew every 8 hours PRN Gas  sodium chloride 0.9% lock flush 10 milliLiter(s) IV Push every 1 hour PRN Pre/post blood products, medications, blood draw, and to maintain line patency  sodium chloride 0.9%. 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 - Free T4 remains elevated   Per endocrine      - Thyroid US when trach is out      - Propanolol as above for optimal T4-->T3 conversion      - c/w Methimazole ---> consider changing to rectal if continues to have emesis for better absorption      - C/w with hydrocortisone    dextrose 40% Gel 15 Gram(s) Oral once  dextrose 50% Injectable 25 Gram(s) IV Push once  dextrose 50% Injectable 12.5 Gram(s) IV Push once  glucagon  Injectable 1 milliGRAM(s) IntraMuscular once  hydrocortisone sodium succinate Injectable 25 milliGRAM(s) IV Push two times a day  insulin lispro (ADMELOG) corrective regimen sliding scale   SubCutaneous every 6 hours  insulin NPH human recombinant 4 Unit(s) SubCutaneous <User Schedule>  methimazole 20 milliGRAM(s) Oral <User Schedule>    ========================INFECTIOUS DISEASE================  Temp 99.0F, WBC uptrending 13-> 16  BC x2 pending  Continue trending WBC and monitoring fever curve   Culture from sybil drain on : NGTD  SCx on  negative    ID following, MD Prater, appreciate recommendations      Patient requires continuous monitoring with bedside rhythm monitoring, pulse ox monitoring, and intermittent blood gas analysis. Care plan discussed with ICU care team. Patient remained critical and at risk for life threatening decompensation.       I, Jaclyn Mendoza, personally performed the services described in this documentation. all medical record entries made by the scribe were at my direction and in my presence. I have reviewed the chart and agree that the record reflects my personal performance and is accurate and complete  Electronically signed: Jaclyn Mendoza, PATIENCE

## 2021-09-13 NOTE — PROGRESS NOTE ADULT - PROBLEM SELECTOR PLAN 5
- H/H slightly dropping post angiogram. Required 3u transfusion intraop.   - Repeat H/H today  - Consider noncontrast abd CT to rule out retroperitoneal bleed  - Type & screen - postop angiogram H/H continue to drop. Intraop received 3uPRBC and post-op required additional 2uPRBC (last on 9/12).   - Continue to trend H/H and have active type and screen  - CT Angio 9/12 shows moderate sized left-sided retroperitoneal hematoma with intraperitoneal extension. 2 cm segment of right femoral artery occlusion with distal reconstitution.

## 2021-09-13 NOTE — PROGRESS NOTE ADULT - SUBJECTIVE AND OBJECTIVE BOX
RICKY JOINT  MRN#: [id number]  Subjective:  Pulmonary progress  : recurrent Acute hypoxic respiratory Failure ,aspiration pneumonia, NICM  , chart reviewed and H/O obtained radiological and Laboratory study reviewed patient Examined     65M PMH ACC/AHA stage D HF due to NICM HM2 LVAD , TV annuloplasty ring 17 as destination therapy due to severe peripheral artery disease with significant stenosis  SIADH, Depression, CKD-3 with hyperkalemia, past E. coli UTIs, driveline drainage (21) and COVID-19 (back in 2020)  He was recently seen in clinic where he complained of abdominal pain and dark stools w constipation back in May. He presents to [facility] ER today weakness and fatigue, moderate and + Black stools for three days, on coumadin secondary to warfarin use in the setting of an LVAD. Patient has required transfusions for GIB in the past. Mostly recently back in 2021 pt had anemia with dark stools. No interventions was done at that time. However Last Endoscopy was done in 2020 (negative). Today labs show patient is anemic with H/H of 4.5/16.3,. INR is 8.84 MAP in the 90s, Temp 35.1. He denies any chest pain, shortness of breath, dizziness, abd pain, nausea or vomiting. found to have  rectal bleeding underwent endoscopy ,old blood in the proximal ileum ,  develop sepsis with LL opacity given Antibiotics , Extubated , reintubated , Bronchoscopy on Zosyn for LL pneumonia  and Amiodarone S/P TV Annuloplasty , patient remain intubated on full ventilatory support .S/P multiple units of blood transfusion , remain on full ventilatory support on Precedex and propofol , new central IJ line , diarrhea C diff. +ve on po vancomycin and IV Flagyl,  mildly distended belly , fever start on cefepime 2gm q 8 hrs S/P tracheostomy .  new RT Subclavian central line continue on contact  isolation ,still diarrhea on C-diff antibiotics ENT follow up appreciated , trial of C-PAP as tolerated , , copious secretion from trach. site chest x ray left lower lobe pneumonia , tolerating trch. collar 50% FI02 still excessive secretion need pulmonary toilet , off Ancef antibiotic , no more diarrhea back on full support mechanical ventilator , chest x ray show improvement in LLL air space disease, more awake and responsive on tube feeding no more diarrhea , S/P  Ancef for bacteremia no fever ,ID follow up noted ,  no nausea or vomiting or diarrhea still very weak and tired , event note pulled NG tube now replaced , back on tube feeding ,still on po vancomycin , getting PT and OT at bed side , no plan for decannulation for now. , no more diarrhea receiving PT and OPT at bed side , minimal secretion from tracheostomy site , no SOB getting stronger , improve muscle tone patient transfer to monitor bed still on contact isolation for C-Difficel colitis on 50% FI02, NG tube clogged , and change to resume tube feeding still loose stool . H/H drop significantly require blood transfusion , most likely GI bleeding , IV heparin D/C , vomiting 200 cc of creamy color tube feeding on hold no sob, still melena monitor in the CTU possible capsule endoscopy , H/H is stable ., patient develop TR sided  pneumothorax require chest tube placement , RT IJ central line  placed , develop fever shaking chills , blood culture positive for serratia marcescens , start on cefepime .the patient  become hypoxic and hypotensive placed on full ventilatory support and Vasopressin , levophed and Dobutamine ,S/P blood transfusion on meropenem and vancomycin ,   , on and  off pressors , occasional agitation on Precedex .S/P IR cholecystostomy tube drainage placement in the RT upper Quadrant , resume anticoagulation chest x ray noted C-PAP trail lasted only for 2 hrs , new RT SC line and D/C RT IJ line , RT pig tail cathter has been removed , tolerating C-PAP trial placed on trach. collar 50% FI02 GI consultant noted on NG tube feeding as tolerated , develop AF RVR S/P  bolus Amiodarone  back to regular sinus Rhythm , Flat Affect depressed , back on tube feeding Vital AF at 60 cc/ hr .still intermittent abdominal pain , no fever saturation is accepted  back on full ventilatory support , tired and sleepy on round  .start  back on  tube feeding . tolerating it very well , no nausea or vomiting , good 02 saturation on trac-collar on methimazole for hyperthyroidism , no new C/O no plan for decannulation yet , electrolyte blood test is still pending .on respiratory isolation sputum culture is negative , increase WBC  improved on cefepime , sputum positive for enterococcus , condition is the same ,still C/O Abdominal pain , white count is improving , no chest pain or SOB ,  .placed on Reglan 10 mg TID for gastric motility , depressed , withdrawn. on full NG tube diet with Vital , secretion are much improved , went for mesenteric angiogram , unable to stent SMA S/P 3 units of blood transfusion on trach. collar 40% Fi02, anxious to eat discuss possible decannulation,  H/H  are stable CTA of abdomen as per vascular         (2021 16:57)    PAST MEDICAL & SURGICAL HISTORY:  CHF (congestive heart failure)    CAD (coronary artery disease)  Depression    Pleural effusion    History of 2019 novel coronavirus disease (COVID-19)  2020    Hemorrhoids    Bleeding hemorrhoids    Peripheral arterial disease    Claudication    BPH with urinary obstruction    ACC/AHA stage D systolic heart failure  Anticoagulation goal of INR 2.0 to 2.5    Falls    Clavicle fracture    CKD (chronic kidney disease), stage III    Iron deficiency anemia    H/O epistaxis    Vertigo    GI bleed    S/P TVR (tricuspid valve repair)    S/P ventricular assist device    S/P endoscopy    OBJECTIVE:  ICU Vital Signs Last 24 Hrs  T(C): 38.2 (2021 10:00), Max: 38.5 (2021 12:00)  T(F): 100.8 (2021 10:00), Max: 101.3 (2021 12:00)  HR: 65 (2021 10:00) (61 - 69)  BP: --  BP(mean): --  ABP: 105/67 (2021 10:00) (90/54 - 113/64)  ABP(mean): 77 (2021 10:00) (63 - 77)  RR: 20 (2021 10:00) (19 - 35)  SpO2: 99% (2021 10:00) (96% - 100%)       @ 07:  -   @ 07:00  --------------------------------------------------------  IN: 2693.9 mL / OUT: 1415 mL / NET: 1278.9 mL     @ 07:01  -   @ 10:49  --------------------------------------------------------  IN: 420.8 mL / OUT: 115 mL / NET: 305.8 mL  PHYSICAL EXAM:Daily   Elderly male S/P tracheostomy on full ventilatory support  50% FI02 ,  Daily Weight in k.4 (2021 00:00)  HEENT:     + NCAT  + EOMI  - Conjuctival edema   - Icterus   - Thrush   - ETT  + NGT/OGT  Neck:         + FROM  RT IJ  line JVD  - Nodes - Masses + Mid-line trachea + Tracheostomy  Chest:            normal A-P diameter    Lungs:          + CTA   + Rhonchi    - Rales    - Wheezing + Decreased  LT BS   - Dullness R L  Cardiac:       + S1 + S2    + RRR   - Irregular   - S3  - S4    - Murmurs   - Rub   - Hamman’s sign   Abdomen:    + BS  + Soft + Non-tender - Distended - Organomegaly - PEG .cholecystostomy tube in place  Extremities:   - Cyanosis U/L   - Clubbing  U/L  + LE/UE Edema   + Capillary refill    + Pulses   Neuro:        - Awake   -  Alert   - Confused   - Lethargic   + Sedated  + Generalized Weakness  Skin:        - Rashes    - Erythema   + Normal incisions   + IV sites intact          HOSPITAL MEDICATIONS: All medications reviewed and analyzed  MEDICATIONS  (STANDING):  amiodarone    Tablet 200 milliGRAM(s) Oral daily  chlorhexidine 0.12% Liquid 15 milliLiter(s) Oral Mucosa every 12 hours  chlorhexidine 2% Cloths 1 Application(s) Topical <User Schedule>  dexmedetomidine Infusion 0.5 MICROgram(s)/kG/Hr (9.81 mL/Hr) IV Continuous <Continuous>  dextrose 50% Injectable 50 milliLiter(s) IV Push every 15 minutes  heparin  Infusion 400 Unit(s)/Hr (12.5 mL/Hr) IV Continuous <Continuous>  Hydromorphone  Injectable 0.5 milliGRAM(s) IV Push once  insulin lispro (ADMELOG) corrective regimen sliding scale   SubCutaneous every 6 hours  pantoprazole  Injectable 40 milliGRAM(s) IV Push every 12 hours  piperacillin/tazobactam IVPB.. 3.375 Gram(s) IV Intermittent every 8 hours  propofol Infusion 20 MICROgram(s)/kG/Min (9.42 mL/Hr) IV Continuous <Continuous>  sodium chloride 0.9% lock flush 3 milliLiter(s) IV Push every 8 hours  sodium chloride 0.9%. 1000 milliLiter(s) (10 mL/Hr) IV Continuous <Continuous>    MEDICATIONS  (PRN):  acetaminophen    Suspension .. 650 milliGRAM(s) Oral every 6 hours PRN Temp greater or equal to 38C (100.4F)    LABS: All Lab data reviewed and analyzed                         9.4    13.44 )-----------( 138      ( 13 Sep 2021 01:03 )             29.1       134<L>  |  101  |  10  ----------------------------<  132<H>  3.9   |  26  |  0.46<L>    Ca    9.2      13 Sep 2021 01:03  Phos  3.0     -  Mg     1.7         TPro  6.5  /  Alb  2.8<L>  /  TBili  0.6  /  DBili  x   /  AST  33  /  ALT  75<H>  /  AlkPhos  201<H>      Ca    9.1      12 Sep 2021 00:18  Phos  3.4       Mg     1.8         TPro  6.7  /  Alb  2.9<L>  /  TBili  0.4  /  DBili  x   /  AST  35  /  ALT  94<H>  /  AlkPhos  196<H>      Ca    8.9      11 Sep 2021 00:53  Phos  3.5       Mg     1.6         TPro  6.0  /  Alb  2.8<L>  /  TBili  0.6  /  DBili  x   /  AST  42<H>  /  ALT  115<H>  /  AlkPhos  191<H>        Ca    9.5      10 Sep 2021 00:43  Phos  3.7     -10  Mg     1.8     09-10    TPro  7.3  /  Alb  3.1<L>  /  TBili  0.3  /  DBili  x   /  AST  47<H>  /  ALT  171<H>  /  AlkPhos  259<H>  09-10                                                                                                                              PTT - ( 2021 04:52 )  PTT:45.2 sec LIVER FUNCTIONS - ( 2021 00:42 )  Alb: 3.4 g/dL / Pro: 6.7 g/dL / ALK PHOS: 213 U/L / ALT: 15 U/L / AST: 24 U/L / GGT: x           RADIOLOGY: - Reviewed and analyzed RT Pig tail cathter  , LVAD HM2, CT scan of abdomen reviewed result noted

## 2021-09-14 NOTE — PROGRESS NOTE ADULT - SUBJECTIVE AND OBJECTIVE BOX
RICKY HAMPTON  MRN-[id number]  Patient is a 66y old  Male who presents with a chief complaint of Anemia, Supratherapeutic INR, Dark Stools (14 Sep 2021 09:35)    HPI:  65M PMH ACC/AHA stage D HF due to NICM HM2 LVAD , TV annuloplasty ring 17 as destination therapy due to severe peripheral artery disease with significant stenosis  SIADH, Depression, CKD-3 with hyperkalemia, past E. coli UTIs, driveline drainage (21) and COVID-19 (back in 2020)  He was recently seen in clinic where he complained of abdominal pain and dark stools w constipation back in May. He presents to [facility] ER today weakness and fatigue, moderate and + Black stools for three days, on coumadin secondary to warfarin use in the setting of an LVAD. Patient has required transfusions for GIB in the past. Mostly recently back in 2021 pt had anemia with dark stools. No interventions was done at that time. However Last Endoscopy was done in 2020 (negative). Today labs show patient is anemic with H/H of 4.5/16.3,. INR is 8.84 MAP in the 90s, Temp 35.1. He denies any chest pain, shortness of breath, dizziness, abd pain, nausea or vomiting.       (2021 16:57)      Surgery/Hospital Course:   admit for melena w/ anemia, INR 8.84   6/15 Capsul study (+) for small bowel bleed, balloon endoscopy (old blood in prox ileum); post EGD - septic w/ L opacity, re-intubated for concern for aspiration, TTE (Mod MR, decrease biV w/ interventricular septum boweing towards R)   bronch    +C Diff    CT C/A/P: Fluid filled colon which may be 2/2 rapid transit. Small bilateral pleffs with associates. Compressive atelectasis New ISABELLE & LLL  parenchymal opacities, suspicious for pneumonia. 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    #8 Shiley trach at bedside    LVAD speed increased to 9200   Bronch   TC since . Patient transferred to SDU.    INR today 2.64.  H&H 7.3/24 this AM.  Will repeat CBC at noon, and will send stool guaiac Patient with persistent abdominal tenderness, rate of tube feeds decreased.  No nausea/vomiting.     INR today 2.4. H&H 9.1/28.6 low flow overnight /N&V, refusing Tube feeds on D5 1/2 normal  @50 cc/hr   INR 2.69  H&H 7.7/.1 refusing Tube feeds on D5 1/2 normal  @50 cc/hr. This am + BM Melena Dr Oneill HF  aware- PRBC x1  GI team consulted -  NPO  plan on study in am-  D/w Dr Cadet Patient  to return to CTU for further management; 1PRBCS    Post op INR 2.2 today.  No bleeding. BC + for SM.  Pt is hypotensive requiring pressor and inotropic support.  ID follow up today on Cefepime will follow.   R PTC for PTX    CT C/A/P: sub q emphysema in R chest wall, GGO RUL, small ascites CTH negative; Abd US: GB thickening, pericholecystic fluid     Perchole drain in place continues to drain total output overnight 133.  Fever today 38.8    duplex LE negative    Patient with persistent abdominal pain, refusing tube feeds and medications, Psych consulted   CTA A/P ordered to r/o mesenteric ischemia 2/2 persistent anorexia, nausea, vomiting. Revealed:  Evaluation of the mesenteric vessels is limited by streak artifact from LVAD. There appears to be severe stenosis of the proximal SMA; abdominal mesenteric doppler is recommended for further evaluation. 2.  No small bowel findings to suggest acute mesenteric ischemia. 3.  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  8/15: Cultures resulted BC 1/2 +GPC in clusters, SC enterobacter; mesenteric duplex: borderline stenosis of proximal SMA  : CT C/A/P noncon: Nondular opacities in R lung apex w/cavitation, abd nl  :  Continue current care, treatment of thyrotoxicosis with medications as per endocrine, d/c ABX as per team.    RUQ sono: Contracted gallbladder with cholecystostomy tube in place.  9/10 failed SMA stent, L fem PSA, s/p 3U PRCB   CTA Abd/Pelvis L RP hematoma     Today: Plan for a urine and pseudo pan culture. Continue trach collar as tolerated, will start antibiotics for fevers.     REVIEW OF SYSTEMS:  Gen: No fever  EYES/ENT: No visual changes;  No vertigo or throat pain   NECK: No pain   RES:  No shortness of breath or Cough  CARD: No chest pain   GI: No abdominal pain  : No dysuria  NEURO: No weakness  SKIN: No itching, rashes     ICU Vital Signs Last 24 Hrs  T(C): 37.9 (14 Sep 2021 08:00), Max: 38.3 (13 Sep 2021 12:00)  T(F): 100.3 (14 Sep 2021 08:00), Max: 100.9 (13 Sep 2021 12:00)  HR: 93 (14 Sep 2021 11:00) (88 - 100)  BP: 102/67 (13 Sep 2021 12:15) (102/67 - 102/67)  BP(mean): 80 (13 Sep 2021 12:15) (80 - 80)  ABP: 105/79 (14 Sep 2021 11:00) (90/62 - 136/103)  ABP(mean): 87 (14 Sep 2021 11:00) (69 - 114)  RR: 20 (14 Sep 2021 11:00) (20 - 47)  SpO2: 99% (14 Sep 2021 11:00) (98% - 100%)      Physical Exam:  Gen:  Awake, alert   CNS: non focal 	  Neck: no JVD  RES : clear , no wheezing              CVS: Regular  rhythm. Normal S1/S2  Abd: Soft, non-distended. Bowel sounds present.  Skin: No rash.  Ext:  no edema    ============================I/O===========================   I&O's Detail    13 Sep 2021 07:01  -  14 Sep 2021 07:00  --------------------------------------------------------  IN:    Enteral Tube Flush: 320 mL    IV PiggyBack: 150 mL    Miscellaneous Tube Feedin mL    sodium chloride 0.9%: 240 mL  Total IN: 1765 mL    OUT:    Drain (mL): 200 mL    Indwelling Catheter - Urethral (mL): 205 mL    Voided (mL): 600 mL  Total OUT: 1005 mL    Total NET: 760 mL      14 Sep 2021 07:01  -  14 Sep 2021 11:36  --------------------------------------------------------  IN:    Miscellaneous Tube Feedin mL    sodium chloride 0.9%: 40 mL  Total IN: 220 mL    OUT:  Total OUT: 0 mL    Total NET: 220 mL        ============================ LABS =========================                        9.7    21.31 )-----------( 143      ( 14 Sep 2021 01:25 )             30.0     -14    133<L>  |  99  |  11  ----------------------------<  160<H>  4.1   |  23  |  0.42<L>    Ca    8.5      14 Sep 2021 00:33  Phos  2.6       Mg     1.6         TPro  6.0  /  Alb  2.8<L>  /  TBili  1.2  /  DBili  x   /  AST  36  /  ALT  60<H>  /  AlkPhos  215<H>      LIVER FUNCTIONS - ( 14 Sep 2021 00:33 )  Alb: 2.8 g/dL / Pro: 6.0 g/dL / ALK PHOS: 215 U/L / ALT: 60 U/L / AST: 36 U/L / GGT: x             ABG - ( 14 Sep 2021 00:07 )  pH, Arterial: 7.41  pH, Blood: x     /  pCO2: 41    /  pO2: 124   / HCO3: 26    / Base Excess: 1.2   /  SaO2: 99.5                ======================Micro/Rad/Cardio=================  Culture: Reviewed   CXR: Reviewed  Echo:Reviewed  ======================================================  PAST MEDICAL & SURGICAL HISTORY:  CHF (congestive heart failure)    CAD (coronary artery disease)    Depression    Pleural effusion    History of  novel coronavirus disease (COVID-19)  2020    Hemorrhoids    Bleeding hemorrhoids    Peripheral arterial disease    Claudication    BPH with urinary obstruction    ACC/AHA stage D systolic heart failure    Anticoagulation goal of INR 2.0 to 2.5    Falls    Clavicle fracture    CKD (chronic kidney disease), stage III    Iron deficiency anemia    H/O epistaxis    Vertigo    GI bleed    S/P TVR (tricuspid valve repair)    S/P ventricular assist device    S/P endoscopy      ====================ASSESSMENT ==============  Stage D Nonischemic Cardiomyopathy, Status Post HM2 on 2017    Cardiogenic shock  Hemodynamic instability   Acute hypoxemic respiratory failure s/p trach   GI bleed , Status Post Enteroscopy   Anemia, in setting of melena   Chronic Kidney Disease  Stress hyperglycemia   C.diff positive on    Hypovolemic shock  Septic shock  Leukocytosis  Thrombocytopenia   GB thickening/percholecystic s/p perc choley by IT   SMA stenosis  Serratia/citrobacter pneumonia   Stenotrophomonas pneumonia   Enterobacter pneumonia   Nasuea/vomiting  Deconditioning      Plan:  ====================== NEUROLOGY=====================  C/w mirtazapine and sertraline for depression  Deconditioned, PT/Ambulate as tolerated    mirtazapine 7.5 milliGRAM(s) Oral daily  sertraline 100 milliGRAM(s) Oral daily    ==================== RESPIRATORY======================  Acute hypoxemic respiratory failure s/p #8 shiley trach on , continue TC as tolerated (TC since )  Continue close monitoring of respiratory rate and breathing pattern, following of ABG’s with A-line monitoring, continuous pulse oximetry monitoring.   Moderate secretions, continue suctioning prn, pulmonary toileting.     Mechanical Ventilation:  Mode: standby    ====================CARDIOVASCULAR==================  Stage D Nonischemic Cardiomyopathy, Status Post HM2 on 2017; LVAD settings 9200, flow 6.1  TTE : reveals at least moderate MR, mild AI, severe global LV syst dysfxn, dec RV fxn   Propranolol for rate control of HR 2/2 Hyperthyroidism, titrate as tolerated per Endo  Continue invasive hemodynamic monitoring     propranolol 40 milliGRAM(s) Oral every 8 hours    ===================HEMATOLOGIC/ONC ===================  Acute blood loss anemia, monitor H&H/Plts    Holding coumadin and ASA given recurrent GI bleeding. Continue to monitor LDH daily.    ===================== RENAL =========================  Continue monitoring urine output, I&OS, BUN/Cr   Euvolemic, even fluid balance, currently off diuretics.    Plan for a urine and pseudo pan culture.  Replete lytes PRN. Keep K> 4 and Mg >2     ==================== GASTROINTESTINAL===================  S/p cholecystostomy tube placed on  with IR   CTA A/P : Evaluation of the mesenteric vessels is limited by streak artifact from LVAD. There appears to be severe stenosis of the proximal SMA; abdominal mesenteric doppler is recommended for further evaluation. 2.  No small bowel findings to suggest acute mesenteric ischemia. 3.  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  Mesenteric duplex on 8/15 borderline stenosis of proximal SMA. Mesenteric angiogram tentatively scheduled for later this week   Vital 1.5 shantelle feeds - Continue increasing tube feeds 5cc/day for goal of 65cc/hr as tolerated  CT Abd/Pel on  without acute process   Reglan for gut motility  Zofran PRN nausea/vomiting    multivitamin 1 Tablet(s) Oral daily  GI prophylaxis, pantoprazole  Injectable 40 milliGRAM(s) IV Push every 12 hours  simethicone 80 milliGRAM(s) Chew every 8 hours PRN Gas  sodium chloride 0.9% lock flush 10 milliLiter(s) IV Push every 1 hour PRN Pre/post blood products, medications, blood draw, and to maintain line patency  sodium chloride 0.9%. 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 - Free T4 remains elevated   Per endocrine      - Thyroid US when trach is out      - Propanolol as above for optimal T4-->T3 conversion      - c/w Methimazole ---> consider changing to rectal if continues to have emesis for better absorption    dextrose 40% Gel 15 Gram(s) Oral once  dextrose 50% Injectable 25 Gram(s) IV Push once  dextrose 50% Injectable 12.5 Gram(s) IV Push once  glucagon  Injectable 1 milliGRAM(s) IntraMuscular once  insulin lispro (ADMELOG) corrective regimen sliding scale   SubCutaneous every 6 hours  insulin NPH human recombinant 4 Unit(s) SubCutaneous <User Schedule>  methimazole 20 milliGRAM(s) Oral <User Schedule>    ========================INFECTIOUS DISEASE================  Temp 100.3F, WBC rising 16.58 -> 21.31  Continue trending WBC and monitoring fever curve   Culture from dawn drain on : NGTD  SCx on  negative  ID following, MD Prater, appreciate recommendations        Patient requires continuous monitoring with bedside rhythm monitoring, pulse ox monitoring, and intermittent blood gas analysis. Care plan discussed with ICU care team. Patient remained critical and at risk for life threatening decompensation.     By signing my name below, I, Aparna Saleh, attest that this documentation has been prepared under the direction and in the presence of CLAUDIA Mejia   Electronically signed: Cesia Villegas, 21 @ 11:37    I, Go Sellers, personally performed the services described in this documentation. all medical record entries made by the scribe were at my direction and in my presence. I have reviewed the chart and agree that the record reflects my personal performance and is accurate and complete  Electronically signed: CLAUDIA Mejia

## 2021-09-14 NOTE — PROGRESS NOTE ADULT - ASSESSMENT
65 year old male with history of Stage D HF due to NICM on LVAD, TV annuloplasty ring 9/12/17 as destination therapy due to severe peripheral artery disease with significant stenosis  SIADH, Depression, CKD-3 with hyperkalemia, admitted 6/14/21 with symptomatic anemia with Hgb 4.5 in setting of INR 8.8, thereafter with complicated hospital course including VAP, serratia bacteremia with acalculous cholecystitis s/p percutaneous tube, abdominal pain s/p attempted SMA stent on 9/10/21, and now found to have L RP bleed.     Endocrine consulted for management of hyperthyroidism in the setting of recent amiodarone use and 2 IV contrast CTs with (7/28 and 8/13) and steroid induced hyperglycemia on tube feeds    #Hyperthyroidism likely 2/2 Amiodarone and contrast induced thyroiditis    Most likely Type 1 AIT given abrupt presentation after Amiodarone use and positive TPO Ab. Prior to initiation of Amiodarone on 6/14, TSH was normal (1.98). Nearly two months later, on 8/7 - TSH was < 0.01    TFTs 9/13: FT4 3.4 (from 5). No TT3 but has downtrended to 110 (126, 165)    Recs  -Ideally, contrast imaging should be deferred until the patient is biochemically euthyroid as further iodine load can exacerbate thyrotoxicosis. However, if contrast imaging is required, risks of deferring the imaging need to be weighed against the risks of further contrast load in this setting.   -CT chest reviewed with radiology - no large or obvious nodules noted in thyroid, however hard to exclude smaller nodules due to presence of trach and artifact  -Recheck total T3 and free T4 on 9/15 (ordered)  -Continue Methimazole to 20 mg daily. LFTs and biliary labs stable  -Continue Propranolol 40 mg q 8 hrs. Goal HR 60-80.   -Continues teroid taper as follows;   1. 9/8, 9/9, 9/10: Hydrocortisone 50 mg q 12 hrs, stop after 6 doses  2. 9/11, 9/12, 9/13: Hydrocortisone 25 mg q 12 hrs, stop after 6 doses  3. 9/14, 9/15: Hydrocortisone 25 mg daily, stop after 2 doses  Patient can then stop steroids thereafter    #Steroid induced hyperglycemia   HbA1c 5.4%. FS at goal 100-180  -continue NPH to 4 units q6 hrs. Hold if NPO  -continue low dose correctional scale q6 hrs   -BS testing q6      Sia Aceves MD  Endocrine Fellow  Pager: [phone]/TIMBO [phone]  Consults 9am-5pm: [phone]  After 5pm and weekends: [phone]     65 year old male with history of Stage D HF due to NICM on LVAD, TV annuloplasty ring 9/12/17 as destination therapy due to severe peripheral artery disease with significant stenosis  SIADH, Depression, CKD-3 with hyperkalemia, admitted 6/14/21 with symptomatic anemia with Hgb 4.5 in setting of INR 8.8, thereafter with complicated hospital course including VAP, serratia bacteremia with acalculous cholecystitis s/p percutaneous tube, abdominal pain s/p attempted SMA stent on 9/10/21, and now found to have L RP bleed.     Endocrine consulted for management of hyperthyroidism in the setting of recent amiodarone use and 2 IV contrast CTs with (7/28 and 8/13) and steroid induced hyperglycemia on tube feeds    #Hyperthyroidism likely 2/2 Amiodarone and contrast induced thyroiditis    Most likely Type 1 AIT given abrupt presentation after Amiodarone use and positive TPO Ab. Prior to initiation of Amiodarone on 6/14, TSH was normal (1.98). Nearly two months later, on 8/7 - TSH was < 0.01    TFTs 9/13: FT4 3.4 (from 5). No TT3 but has downtrended to 110 (126, 165)    Recs  -Ideally, contrast imaging should be deferred until the patient is biochemically euthyroid as further iodine load can exacerbate thyrotoxicosis. However, if contrast imaging is required, risks of deferring the imaging need to be weighed against the risks of further contrast load in this setting.   -CT chest reviewed with radiology - no large or obvious nodules noted in thyroid, however hard to exclude smaller nodules due to presence of trach and artifact  -Recheck total T3 and free T4 on 9/15 (ordered)  -Continue Methimazole to 20 mg daily. LFTs and biliary labs stable  -Continue Propranolol 40 mg q 8 hrs. Goal HR 60-80.   -Continues teroid taper as follows;   1. 9/8, 9/9, 9/10: Hydrocortisone 50 mg q 12 hrs, stop after 6 doses  2. 9/11, 9/12, 9/13: Hydrocortisone 25 mg q 12 hrs, stop after 6 doses  3. 9/14, 9/15: Hydrocortisone 25 mg daily, stop after 2 doses  Patient can then stop steroids thereafter    #Steroid induced hyperglycemia   HbA1c 5.4%. FS at goal 100-180  -continue NPH to 4 units q6 hrs. Hold if NPO  -continue low dose correctional scale q6 hrs   -BS testing q6    Discussed with Dr. Beatty and primary team    Sia Aceves MD  Endocrine Fellow  Pager: [phone]/TIMBO [phone]  Consults 9am-5pm: [phone]  After 5pm and weekends: [phone]     65 year old male with history of Stage D HF due to NICM on LVAD, TV annuloplasty ring 9/12/17 as destination therapy due to severe peripheral artery disease with significant stenosis  SIADH, Depression, CKD-3 with hyperkalemia, admitted 6/14/21 with symptomatic anemia with Hgb 4.5 in setting of INR 8.8, thereafter with complicated hospital course including VAP, serratia bacteremia with acalculous cholecystitis s/p percutaneous tube, abdominal pain s/p attempted SMA stent on 9/10/21, and now found to have L RP bleed.     Endocrine consulted for management of hyperthyroidism in the setting of recent amiodarone use and 2 IV contrast CTs with (7/28 and 8/13) and steroid induced hyperglycemia on tube feeds    #Hyperthyroidism likely 2/2 Amiodarone and contrast induced thyroiditis    Most likely Type 1 AIT given abrupt presentation after Amiodarone use and positive TPO Ab. Prior to initiation of Amiodarone on 6/14, TSH was normal (1.98). Nearly two months later, on 8/7 - TSH was < 0.01    TFTs 9/13: FT4 3.4 (from 5). No TT3 but has downtrended to 110 (126, 165)    Recs  -Ideally, contrast imaging should be deferred until the patient is biochemically euthyroid as further iodine load can exacerbate thyrotoxicosis. However, if contrast imaging is required, risks of deferring the imaging need to be weighed against the risks of further contrast load in this setting.   -CT chest reviewed with radiology - no large or obvious nodules noted in thyroid, however hard to exclude smaller nodules due to presence of trach and artifact  -Recheck total T3 and free T4 on 9/15 (ordered)  -Continue Methimazole to 20 mg daily. LFTs and biliary labs stable  -Continue Propranolol 40 mg q 8 hrs. Goal HR 60-80.   -Continues teroid taper as follows;   1. 9/8, 9/9, 9/10: Hydrocortisone 50 mg q 12 hrs, stop after 6 doses  2. 9/11, 9/12, 9/13: Hydrocortisone 25 mg q 12 hrs, stop after 6 doses  3. 9/14, 9/15: Hydrocortisone 25 mg daily, stop after 2 doses  Patient can then stop steroids thereafter    #Steroid induced hyperglycemia   HbA1c 5.4%. FS at goal 100-180  Feeds currently below goal at 50 cc/hr (uptitrating slowly due to issues with vomiting in the past)   -continue NPH to 4 units q6 hrs. Hold if NPO  -continue low dose correctional scale q6 hrs   -BS testing q6      Sia Aceves MD  Endocrine Fellow  Pager: [phone]/TIMBO [phone]  Consults 9am-5pm: [phone]  After 5pm and weekends: [phone]     65 year old male with history of Stage D HF due to NICM on LVAD, TV annuloplasty ring 9/12/17 as destination therapy due to severe peripheral artery disease with significant stenosis  SIADH, Depression, CKD-3 with hyperkalemia, admitted 6/14/21 with symptomatic anemia with Hgb 4.5 in setting of INR 8.8, thereafter with complicated hospital course including VAP, serratia bacteremia with acalculous cholecystitis s/p percutaneous tube, abdominal pain s/p attempted SMA stent on 9/10/21, and now found to have L RP bleed.     Endocrine consulted for management of hyperthyroidism in the setting of recent amiodarone use and 2 IV contrast CTs with (7/28 and 8/13) and steroid induced hyperglycemia on tube feeds    #Hyperthyroidism likely 2/2 Amiodarone and contrast induced thyroiditis    Most likely Type 1 AIT given abrupt presentation after Amiodarone use and positive TPO Ab. Prior to initiation of Amiodarone on 6/14, TSH was normal (1.98). Nearly two months later, on 8/7 - TSH was < 0.01    TFTs 9/13: FT4 3.4 (from 5). No TT3 but has downtrended to 110 (126, 165)    Recs  -Ideally, contrast imaging should be deferred until the patient is biochemically euthyroid as further iodine load can exacerbate thyrotoxicosis. However, if contrast imaging is required, risks of deferring the imaging need to be weighed against the risks of further contrast load in this setting.   -CT chest reviewed with radiology - no large or obvious nodules noted in thyroid, however hard to exclude smaller nodules due to presence of trach and artifact  -Recheck total T3 and free T4 on 9/15 (ordered)  -Continue Methimazole to 20 mg daily. LFTs and biliary labs stable  -Continue Propranolol 40 mg q 8 hrs. Goal HR 60-80.   -Continue steroid taper as follows;   1. 9/8, 9/9, 9/10: Hydrocortisone 50 mg q 12 hrs, stop after 6 doses  2. 9/11, 9/12, 9/13: Hydrocortisone 25 mg q 12 hrs, stop after 6 doses  3. 9/14, 9/15: Hydrocortisone 25 mg daily, stop after 2 doses  Patient can then stop steroids thereafter    #Steroid induced hyperglycemia   HbA1c 5.4%. FS at goal 100-180  Feeds currently below goal at 50 cc/hr (uptitrating slowly due to issues with vomiting in the past)   -continue NPH to 4 units q6 hrs. Hold if NPO  -continue low dose correctional scale q6 hrs   -BS testing q6      Sia Aceves MD  Endocrine Fellow  Pager: [phone]/TIMBO [phone]  Consults 9am-5pm: [phone]  After 5pm and weekends: [phone]     65 year old male with history of Stage D HF due to NICM on LVAD, TV annuloplasty ring 9/12/17 as destination therapy due to severe peripheral artery disease with significant stenosis  SIADH, Depression, CKD-3 with hyperkalemia, admitted 6/14/21 with symptomatic anemia with Hgb 4.5 in setting of INR 8.8, thereafter with complicated hospital course including VAP, serratia bacteremia with acalculous cholecystitis s/p percutaneous tube, abdominal pain s/p attempted SMA stent on 9/10/21, and now found to have L RP bleed.     Endocrine consulted for management of hyperthyroidism in the setting of recent amiodarone use and 2 IV contrast CTs with (7/28 and 8/13) and steroid induced hyperglycemia on tube feeds    #Hyperthyroidism likely 2/2 Amiodarone and contrast induced thyroiditis    Most likely Type 1 AIT given abrupt presentation after Amiodarone use and positive TPO Ab. Prior to initiation of Amiodarone on 6/14, TSH was normal (1.98). Nearly two months later, on 8/7 - TSH was < 0.01    TFTs 9/13: FT4 3.4 (from 5). No TT3 but has downtrended to 110 (126, 165)    Recs  -Ideally, contrast imaging should be deferred until the patient is biochemically euthyroid as further iodine load can exacerbate thyrotoxicosis. However, if contrast imaging is required, risks of deferring the imaging need to be weighed against the risks of further contrast load in this setting.   -CT chest reviewed with radiology - no large or obvious nodules noted in thyroid, however hard to exclude smaller nodules due to presence of trach and artifact  -Decrease Methimazole to 15 mg daily.   -Recheck Total T3 and FT4 on 9/17  -Continue Propranolol 40 mg q 8 hrs. Goal HR 60-80.   -Continue steroid taper as follows;   1. 9/8, 9/9, 9/10: Hydrocortisone 50 mg q 12 hrs, stop after 6 doses  2. 9/11, 9/12, 9/13: Hydrocortisone 25 mg q 12 hrs, stop after 6 doses  3. 9/14, 9/15: Hydrocortisone 25 mg daily, stop after 2 doses  Patient can then stop steroids thereafter    #Steroid induced hyperglycemia   HbA1c 5.4%. FS at goal 100-180  Feeds currently below goal at 50 cc/hr (uptitrating slowly due to issues with vomiting in the past)   -Discontinue NPH 4 units q6 hrs at this time  -Continue only low dose correctional scale q 6 hrs     Discussed with Dr. Beatty and primary team     Sia Aceves MD  Endocrine Fellow  Pager: [phone]/TIMBO [phone]  Consults 9am-5pm: [phone]  After 5pm and weekends: [phone]

## 2021-09-14 NOTE — PROGRESS NOTE ADULT - SUBJECTIVE AND OBJECTIVE BOX
INFECTIOUS DISEASES FOLLOW UP-- Anitra Prater  [phone]    This is a follow up note for this  66yMale with  Anemia    Acute cholecystitis  interval events- development of left retroperitoneal hematoma  after attempted stent placement and low grade fevers with leukocytosis        ROS:  CONSTITUTIONAL: frail appearing withdrawn    Allergies    Amiodarone Hydrochloride (Other (Severe))    Intolerances        ANTIBIOTICS/RELEVANT:  antimicrobials  cefepime   IVPB 1000 milliGRAM(s) IV Intermittent every 8 hours  cefepime   IVPB      vancomycin    Solution 125 milliGRAM(s) Oral every 6 hours  vancomycin  IVPB        immunologic:    OTHER:  chlorhexidine 2% Cloths 1 Application(s) Topical <User Schedule>  dextrose 40% Gel 15 Gram(s) Oral once  dextrose 50% Injectable 25 Gram(s) IV Push once  dextrose 50% Injectable 12.5 Gram(s) IV Push once  glucagon  Injectable 1 milliGRAM(s) IntraMuscular once  hydrocortisone sodium succinate Injectable 25 milliGRAM(s) IV Push <User Schedule>  insulin lispro (ADMELOG) corrective regimen sliding scale   SubCutaneous every 6 hours  lidocaine   4% Patch 1 Patch Transdermal daily  methimazole 15 milliGRAM(s) Oral <User Schedule>  metoclopramide Injectable 10 milliGRAM(s) IV Push every 8 hours  mirtazapine 7.5 milliGRAM(s) Oral daily  multivitamin 1 Tablet(s) Oral daily  ondansetron Injectable 4 milliGRAM(s) IV Push every 6 hours PRN  pantoprazole  Injectable 40 milliGRAM(s) IV Push every 12 hours  propranolol 40 milliGRAM(s) Oral every 8 hours  sertraline 100 milliGRAM(s) Oral daily  simethicone 80 milliGRAM(s) Chew every 8 hours PRN  sodium chloride 0.9% lock flush 10 milliLiter(s) IV Push every 1 hour PRN  sodium chloride 0.9%. 1000 milliLiter(s) IV Continuous <Continuous>  thiamine 100 milliGRAM(s) Oral daily      Objective:  Vital Signs Last 24 Hrs  T(C): 37.6 (14 Sep 2021 16:00), Max: 38.3 (14 Sep 2021 04:00)  T(F): 99.7 (14 Sep 2021 16:00), Max: 100.9 (14 Sep 2021 04:00)  HR: 88 (14 Sep 2021 18:00) (86 - 100)  BP: 104/68 (14 Sep 2021 18:00) (104/68 - 104/68)  BP(mean): 81 (14 Sep 2021 18:00) (81 - 81)  RR: 37 (14 Sep 2021 18:00) (19 - 43)  SpO2: 99% (14 Sep 2021 18:00) (97% - 100%)    PHYSICAL EXAM:  Constitutional:no acute distress, withdrawn  Eyes:ALONSO, EOMI  Ear/Nose/Throat: no oral lesions, trach site few secretions	  Respiratory: audible bilat  Cardiovascular: VAD sounds  VAD dressing CDI  Gastrointestinal: cholecystotomy drain with bilious fluid  Extremities:no e/e/c  No Lymphadenopathy  IV sites not inflammed.    LABS:                        9.7    21.31 )-----------( 143      ( 14 Sep 2021 01:25 )             30.0         133<L>  |  99  |  11  ----------------------------<  160<H>  4.1   |  23  |  0.42<L>    Ca    8.5      14 Sep 2021 00:33  Phos  2.6       Mg     1.6         TPro  6.0  /  Alb  2.8<L>  /  TBili  1.2  /  DBili  x   /  AST  36  /  ALT  60<H>  /  AlkPhos  215<H>        Urinalysis Basic - ( 14 Sep 2021 13:47 )    Color: Dark Yellow / Appearance: Slightly Turbid / S.025 / pH: x  Gluc: x / Ketone: Negative  / Bili: Negative / Urobili: Negative   Blood: x / Protein: 30 mg/dL / Nitrite: Negative   Leuk Esterase: Negative / RBC: 3 /hpf / WBC 1 /HPF   Sq Epi: x / Non Sq Epi: 1 /hpf / Bacteria: Negative        MICROBIOLOGY:            RECENT CULTURES:   @ 14:58  .Sputum Sputum  --  --  --    Normal Respiratory Bria present  --      RADIOLOGY & ADDITIONAL STUDIES:    < from: Xray Chest 1 View- PORTABLE-Routine (Xray Chest 1 View- PORTABLE-Routine in AM.) (21 @ 02:17) >  IMPRESSION:    The heart is normal in size. The lungs appear to be clear. A left ventricular assisted device is in good position. NG tube is in the stomach however its tip is not seen on the current study. A loop recorder is lying the left hemithorax. No change in appearance of the chest when compared to previous study done 2021 at 2:52 AM.    < end of copied text >

## 2021-09-14 NOTE — PROGRESS NOTE ADULT - PROBLEM SELECTOR PLAN 4
- serratia bacteremia and poss acalculous cholecystitis. B/c with gram + cocci and many enterobacter Carbapenem resistant strain and now s/p per dawn drain  - also with enterobacter from sputum culture 8/13  - off antibiotics  - recently developled worsening leuockytosis, currently 16.58 with low grade fevers (tmax 100.2). Please repeat PAN cultures today and discuss case with ID - serratia bacteremia and poss acalculous cholecystitis. B/c with gram + cocci and many enterobacter Carbapenem resistant strain and now s/p per dawn drain  - also with enterobacter from sputum culture 8/13  - consider broad spectrum abx  - recently developed worsening leukocytosis, currently 16.58 with low grade fevers (tmax 100.2). PAN cultured and will f/u results; appreciate ID recs - serratia bacteremia and poss acalculous cholecystitis. B/c with gram + cocci and many enterobacter Carbapenem resistant strain and now s/p per dawn drain  - also with enterobacter from sputum culture 8/13  - starting broad spectrum abx  - recently developed worsening leukocytosis, currently 16.58 with low grade fevers (tmax 100.2). PAN cultured and will f/u results

## 2021-09-14 NOTE — PROGRESS NOTE ADULT - NSPROGADDITIONALINFOA_GEN_ALL_CORE
The patient's FT4 has finally declined with aggressive MMI therapy and about one month after his last CT scan which makes sense as the contrast effect lasts about 4-6 weeks. The amiodarone effect will linger for 6 months due to its long half life. Will recheck his T3 and FT4 in a week. Adjust steroids and insulin as above.  Sally Beatty MD  Endocrinology Attending  Mondays and Tuesdays 9am-6pm: [phone] (pager)  Other days, night and weekend: [phone]

## 2021-09-14 NOTE — PROGRESS NOTE ADULT - SUBJECTIVE AND OBJECTIVE BOX
INTERVAL EVENTS: No o/n events. Denies CP, dyspnea, palpitations, presyncope, syncope, f/c/n/v.     REVIEW OF SYSTEMS:  Constitutional:     [X] negative [ ] fevers [ ] chills [ ] weight loss [ ] weight gain  HEENT:                  [X] negative [ ] dry eyes [ ] eye irritation [ ] postnasal drip [ ] nasal congestion  CV:                         [X] negative  [ ] chest pain [ ] orthopnea [ ] palpitations [ ] murmur  Resp:                     [X] negative [ ] cough [ ] shortness of breath [ ] wheezing [ ] sputum [ ] hemoptysis  GI:                          [X] negative [ ] nausea [ ] vomiting [ ] diarrhea [ ] constipation [ ] abd pain [ ] dysphagia   :                        [X] negative [ ] dysuria [ ] nocturia [ ] hematuria [ ] increased urinary frequency  MSK:                      [X] negative [ ] back pain [ ] myalgias [ ] arthralgias [ ] fracture  Skin:                       [X] negative [ ] rash [ ] itch  Neuro:                   [X] negative [ ] headache [ ] dizziness [ ] syncope [ ] weakness [ ] numbness  Psych:                    [X] negative [ ] anxiety [ ] depression  Endo:                     [X] negative [ ] diabetes [ ] thyroid problem  Heme/Lymph:      [X] negative [ ] anemia [ ] bleeding problem  Allergic/Immune: [X] negative [ ] itchy eyes [ ] nasal discharge [ ] hives [ ] angioedema    [X] All other systems negative or otherwise described above.  [ ] Unable to assess ROS because ________.    PAST MEDICAL & SURGICAL HISTORY:  No pertinent past medical history    CHF (congestive heart failure)    CAD (coronary artery disease)    Depression    Pleural effusion    History of 2019 novel coronavirus disease (COVID-19)  april 2020    Hemorrhoids    Bleeding hemorrhoids    Peripheral arterial disease    Claudication    BPH with urinary obstruction    ACC/AHA stage D systolic heart failure    Anticoagulation goal of INR 2.0 to 2.5    Falls    Clavicle fracture    CKD (chronic kidney disease), stage III    Iron deficiency anemia    H/O epistaxis    Vertigo    GI bleed    No significant past surgical history    S/P TVR (tricuspid valve repair)    S/P ventricular assist device    S/P endoscopy      MEDICATIONS  (STANDING):  chlorhexidine 2% Cloths 1 Application(s) Topical <User Schedule>  dextrose 40% Gel 15 Gram(s) Oral once  dextrose 50% Injectable 25 Gram(s) IV Push once  dextrose 50% Injectable 12.5 Gram(s) IV Push once  glucagon  Injectable 1 milliGRAM(s) IntraMuscular once  insulin lispro (ADMELOG) corrective regimen sliding scale   SubCutaneous every 6 hours  insulin NPH human recombinant 4 Unit(s) SubCutaneous <User Schedule>  lidocaine   4% Patch 1 Patch Transdermal daily  methimazole 20 milliGRAM(s) Oral <User Schedule>  metoclopramide Injectable 10 milliGRAM(s) IV Push every 8 hours  mirtazapine 7.5 milliGRAM(s) Oral daily  multivitamin 1 Tablet(s) Oral daily  pantoprazole  Injectable 40 milliGRAM(s) IV Push every 12 hours  propranolol 40 milliGRAM(s) Oral every 8 hours  sertraline 100 milliGRAM(s) Oral daily  sodium chloride 0.9%. 1000 milliLiter(s) (10 mL/Hr) IV Continuous <Continuous>  thiamine 100 milliGRAM(s) Oral daily    MEDICATIONS  (PRN):  ondansetron Injectable 4 milliGRAM(s) IV Push every 6 hours PRN Nausea and/or Vomiting  simethicone 80 milliGRAM(s) Chew every 8 hours PRN Gas  sodium chloride 0.9% lock flush 10 milliLiter(s) IV Push every 1 hour PRN Pre/post blood products, medications, blood draw, and to maintain line patency    ICU Vital Signs Last 24 Hrs  T(C): 38.3 (14 Sep 2021 04:00), Max: 38.3 (13 Sep 2021 12:00)  T(F): 100.9 (14 Sep 2021 04:00), Max: 100.9 (13 Sep 2021 12:00)  HR: 91 (14 Sep 2021 08:00) (85 - 100)  BP: 102/67 (13 Sep 2021 12:15) (102/67 - 102/67)  BP(mean): 80 (13 Sep 2021 12:15) (80 - 80)  ABP: 93/58 (14 Sep 2021 08:00) (87/55 - 136/103)  ABP(mean): 69 (14 Sep 2021 08:00) (66 - 114)  RR: 32 (14 Sep 2021 08:00) (18 - 47)  SpO2: 99% (14 Sep 2021 08:00) (98% - 100%)    Orthostatic VS    Daily     Daily   I&O's Summary    13 Sep 2021 07:01  -  14 Sep 2021 07:00  --------------------------------------------------------  IN: 1765 mL / OUT: 1005 mL / NET: 760 mL    14 Sep 2021 07:01  -  14 Sep 2021 08:24  --------------------------------------------------------  IN: 55 mL / OUT: 0 mL / NET: 55 mL        PHYSICAL EXAM:  GENERAL: No acute distress, well-developed  HEAD:  Atraumatic, Normocephalic  ENT: EOMI, conjunctiva and sclera clear, Neck supple, No JVD, moist mucosa  CHEST/LUNG: Clear to auscultation bilaterally; No wheeze, equal breath sounds bilaterally   BACK: No spinal tenderness  HEART: Regular rate and rhythm; No murmurs, rubs, or gallops, radial and DP 2+ b/l, euvolemic  ABDOMEN: Soft, Nontender, Nondistended  EXTREMITIES:  No clubbing, cyanosis, or edema  PSYCH: Nl behavior, nl affect  NEUROLOGY: AAOx3, non-focal  SKIN: Normal color, No rashes or lesions  LINES: no central lines present     LVAD Interrogation  VAD TYPE HM 2   Speed 9200  Flow 5.8  Power 5.9  PI 5.3  Assessment of driveline exit site: driveline dressing c/d/i  Programming changes: no changes made    LABS:                        9.7    21.31 )-----------( 143      ( 14 Sep 2021 01:25 )             30.0       09-14    133<L>  |  99  |  11  ----------------------------<  160<H>  4.1   |  23  |  0.42<L>    Ca    8.5      14 Sep 2021 00:33  Phos  2.6     09-14  Mg     1.6     09-14    TPro  6.0  /  Alb  2.8<L>  /  TBili  1.2  /  DBili  x   /  AST  36  /  ALT  60<H>  /  AlkPhos  215<H>  09-14        BNP:   proBNP:   Lipid Profile: Cholesterol 153 mg/dL, Triglyceride 141 mg/dL, LDL 91 mg/dL, HDL 34 mg/dL, [08-15-21]  Cholesterol --, Triglyceride 679 mg/dL, LDL --, HDL --, [07-28-21]  Cholesterol --, Triglyceride 145 mg/dL, LDL --, HDL --, [06-27-21]  Cholesterol --, Triglyceride 191 mg/dL, LDL --, HDL --, [06-26-21]  Cholesterol --, Triglyceride 183 mg/dL, LDL --, HDL --, [06-25-21]  Cholesterol --, Triglyceride 518 mg/dL, LDL --, HDL --, [06-22-21]      HgA1c:   TSH: Thyroid Stimulating Hormone, Serum: <0.01 uIU/mL (09-07-21 @ 05:43)    Lactate Dehydrogenase, Serum: 211 U/L (09-13 @ 01:03)  Lactate Dehydrogenase, Serum: 201 U/L (09-12 @ 00:18)  Lactate Dehydrogenase, Serum: 167 U/L (09-11 @ 00:53)      RADIOLOGY & ADDITIONAL STUDIES:    Cardiovascular Diagnostic Testing    ECG: Personally reviewed    Telemetry: reviewed    Echo: Personally reviewed    Stress Testing: none    Cath: none    CXR: Personally reviewed    Other cardiac imaging: none    Other misc imaging: none       INTERVAL EVENTS: Febrile to 100.9 o/n. Still reporting LLQ pain. Denies CP, dyspnea, palpitations, presyncope, syncope, n/v.     REVIEW OF SYSTEMS:  Constitutional:     [ ] negative [X] fevers [ ] chills [ ] weight loss [ ] weight gain  HEENT:                  [X] negative [ ] dry eyes [ ] eye irritation [ ] postnasal drip [ ] nasal congestion  CV:                         [X] negative  [ ] chest pain [ ] orthopnea [ ] palpitations [ ] murmur  Resp:                     [X] negative [ ] cough [ ] shortness of breath [ ] wheezing [ ] sputum [ ] hemoptysis  GI:                          [ ] negative [ ] nausea [ ] vomiting [ ] diarrhea [ ] constipation [X] abd pain [ ] dysphagia   :                        [X] negative [ ] dysuria [ ] nocturia [ ] hematuria [ ] increased urinary frequency  MSK:                      [X] negative [ ] back pain [ ] myalgias [ ] arthralgias [ ] fracture  Skin:                       [X] negative [ ] rash [ ] itch  Neuro:                   [X] negative [ ] headache [ ] dizziness [ ] syncope [ ] weakness [ ] numbness  Psych:                    [X] negative [ ] anxiety [ ] depression  Endo:                     [X] negative [ ] diabetes [ ] thyroid problem  Heme/Lymph:      [X] negative [ ] anemia [ ] bleeding problem  Allergic/Immune: [X] negative [ ] itchy eyes [ ] nasal discharge [ ] hives [ ] angioedema    [X] All other systems negative or otherwise described above.  [ ] Unable to assess ROS because ________.    PAST MEDICAL & SURGICAL HISTORY:  No pertinent past medical history    CHF (congestive heart failure)    CAD (coronary artery disease)    Depression    Pleural effusion    History of 2019 novel coronavirus disease (COVID-19)  april 2020    Hemorrhoids    Bleeding hemorrhoids    Peripheral arterial disease    Claudication    BPH with urinary obstruction    ACC/AHA stage D systolic heart failure    Anticoagulation goal of INR 2.0 to 2.5    Falls    Clavicle fracture    CKD (chronic kidney disease), stage III    Iron deficiency anemia    H/O epistaxis    Vertigo    GI bleed    No significant past surgical history    S/P TVR (tricuspid valve repair)    S/P ventricular assist device    S/P endoscopy      MEDICATIONS  (STANDING):  chlorhexidine 2% Cloths 1 Application(s) Topical <User Schedule>  dextrose 40% Gel 15 Gram(s) Oral once  dextrose 50% Injectable 25 Gram(s) IV Push once  dextrose 50% Injectable 12.5 Gram(s) IV Push once  glucagon  Injectable 1 milliGRAM(s) IntraMuscular once  insulin lispro (ADMELOG) corrective regimen sliding scale   SubCutaneous every 6 hours  insulin NPH human recombinant 4 Unit(s) SubCutaneous <User Schedule>  lidocaine   4% Patch 1 Patch Transdermal daily  methimazole 20 milliGRAM(s) Oral <User Schedule>  metoclopramide Injectable 10 milliGRAM(s) IV Push every 8 hours  mirtazapine 7.5 milliGRAM(s) Oral daily  multivitamin 1 Tablet(s) Oral daily  pantoprazole  Injectable 40 milliGRAM(s) IV Push every 12 hours  propranolol 40 milliGRAM(s) Oral every 8 hours  sertraline 100 milliGRAM(s) Oral daily  sodium chloride 0.9%. 1000 milliLiter(s) (10 mL/Hr) IV Continuous <Continuous>  thiamine 100 milliGRAM(s) Oral daily    MEDICATIONS  (PRN):  ondansetron Injectable 4 milliGRAM(s) IV Push every 6 hours PRN Nausea and/or Vomiting  simethicone 80 milliGRAM(s) Chew every 8 hours PRN Gas  sodium chloride 0.9% lock flush 10 milliLiter(s) IV Push every 1 hour PRN Pre/post blood products, medications, blood draw, and to maintain line patency    ICU Vital Signs Last 24 Hrs  T(C): 38.3 (14 Sep 2021 04:00), Max: 38.3 (13 Sep 2021 12:00)  T(F): 100.9 (14 Sep 2021 04:00), Max: 100.9 (13 Sep 2021 12:00)  HR: 91 (14 Sep 2021 08:00) (85 - 100)  BP: 102/67 (13 Sep 2021 12:15) (102/67 - 102/67)  BP(mean): 80 (13 Sep 2021 12:15) (80 - 80)  ABP: 93/58 (14 Sep 2021 08:00) (87/55 - 136/103)  ABP(mean): 69 (14 Sep 2021 08:00) (66 - 114)  RR: 32 (14 Sep 2021 08:00) (18 - 47)  SpO2: 99% (14 Sep 2021 08:00) (98% - 100%)    Orthostatic VS    Daily     Daily   I&O's Summary    13 Sep 2021 07:01  -  14 Sep 2021 07:00  --------------------------------------------------------  IN: 1765 mL / OUT: 1005 mL / NET: 760 mL    14 Sep 2021 07:01  -  14 Sep 2021 08:24  --------------------------------------------------------  IN: 55 mL / OUT: 0 mL / NET: 55 mL        PHYSICAL EXAM:  Appearance: No Acute Distress, thin-appearing  HEENT: JVP non elevated  Cardiovascular: VAD hum  Respiratory: Clear to auscultation bilaterally  Gastrointestinal: mildly distended, tenderness in LLQ  Skin: no skin lesions  Neurologic: Non-focal  Extremities: No LE edema, warm and well perfused  Psychiatry: Alert  Lines/drains: perc dawn drain in place, brownish contents, no bloody contents    LVAD Interrogation  VAD TYPE HM 2   Speed 9200  Flow 5.9  Power 5.9  PI 5.1  Assessment of driveline exit site: driveline dressing c/d/i  Programming changes: no changes made    LABS:                        9.7    21.31 )-----------( 143      ( 14 Sep 2021 01:25 )             30.0       09-14    133<L>  |  99  |  11  ----------------------------<  160<H>  4.1   |  23  |  0.42<L>    Ca    8.5      14 Sep 2021 00:33  Phos  2.6     09-14  Mg     1.6     09-14    TPro  6.0  /  Alb  2.8<L>  /  TBili  1.2  /  DBili  x   /  AST  36  /  ALT  60<H>  /  AlkPhos  215<H>  09-14        BNP:   proBNP:   Lipid Profile: Cholesterol 153 mg/dL, Triglyceride 141 mg/dL, LDL 91 mg/dL, HDL 34 mg/dL, [08-15-21]  Cholesterol --, Triglyceride 679 mg/dL, LDL --, HDL --, [07-28-21]  Cholesterol --, Triglyceride 145 mg/dL, LDL --, HDL --, [06-27-21]  Cholesterol --, Triglyceride 191 mg/dL, LDL --, HDL --, [06-26-21]  Cholesterol --, Triglyceride 183 mg/dL, LDL --, HDL --, [06-25-21]  Cholesterol --, Triglyceride 518 mg/dL, LDL --, HDL --, [06-22-21]      HgA1c:   TSH: Thyroid Stimulating Hormone, Serum: <0.01 uIU/mL (09-07-21 @ 05:43)    Lactate Dehydrogenase, Serum: 211 U/L (09-13 @ 01:03)  Lactate Dehydrogenase, Serum: 201 U/L (09-12 @ 00:18)  Lactate Dehydrogenase, Serum: 167 U/L (09-11 @ 00:53)      RADIOLOGY & ADDITIONAL STUDIES:    Cardiovascular Diagnostic Testing    ECG: Personally reviewed    Telemetry: occasional PVC's    Echo: Personally reviewed (7/2021)      Stress Testing: none    Cath: reviewed (2017)    CXR: Personally reviewed  < from: Xray Chest 1 View- PORTABLE-Routine (Xray Chest 1 View- PORTABLE-Routine in AM.) (09.14.21 @ 02:17) >  IMPRESSION:    The heart is normal in size. The lungs appear to be clear. A left ventricular assisted device is in good position. NG tube is in the stomach however its tip is not seen on the current study. A loop recorder is lying the left hemithorax. No change in appearance of the chest when compared to previous study done September 13, 2021 at 2:52 AM.    --- End of Report ---      < end of copied text >    Other cardiac imaging: reviewed (2017)    Other misc imaging: none       INTERVAL EVENTS: Febrile to 100.9 o/n. Still reporting LLQ pain. Denies CP, dyspnea, palpitations, presyncope, syncope, n/v.     REVIEW OF SYSTEMS:  Constitutional:     [ ] negative [X] fevers [ ] chills [ ] weight loss [ ] weight gain  HEENT:                  [X] negative [ ] dry eyes [ ] eye irritation [ ] postnasal drip [ ] nasal congestion  CV:                         [X] negative  [ ] chest pain [ ] orthopnea [ ] palpitations [ ] murmur  Resp:                     [X] negative [ ] cough [ ] shortness of breath [ ] wheezing [ ] sputum [ ] hemoptysis  GI:                          [ ] negative [ ] nausea [ ] vomiting [ ] diarrhea [ ] constipation [X] abd pain [ ] dysphagia   :                        [X] negative [ ] dysuria [ ] nocturia [ ] hematuria [ ] increased urinary frequency  MSK:                      [X] negative [ ] back pain [ ] myalgias [ ] arthralgias [ ] fracture  Skin:                       [X] negative [ ] rash [ ] itch  Neuro:                   [X] negative [ ] headache [ ] dizziness [ ] syncope [ ] weakness [ ] numbness  Psych:                    [X] negative [ ] anxiety [ ] depression  Endo:                     [X] negative [ ] diabetes [ ] thyroid problem  Heme/Lymph:      [X] negative [ ] anemia [ ] bleeding problem  Allergic/Immune: [X] negative [ ] itchy eyes [ ] nasal discharge [ ] hives [ ] angioedema    [X] All other systems negative or otherwise described above.  [ ] Unable to assess ROS because ________.    PAST MEDICAL & SURGICAL HISTORY:  No pertinent past medical history    CHF (congestive heart failure)    CAD (coronary artery disease)    Depression    Pleural effusion    History of 2019 novel coronavirus disease (COVID-19)  april 2020    Hemorrhoids    Bleeding hemorrhoids    Peripheral arterial disease    Claudication    BPH with urinary obstruction    ACC/AHA stage D systolic heart failure    Anticoagulation goal of INR 2.0 to 2.5    Falls    Clavicle fracture    CKD (chronic kidney disease), stage III    Iron deficiency anemia    H/O epistaxis    Vertigo    GI bleed    No significant past surgical history    S/P TVR (tricuspid valve repair)    S/P ventricular assist device    S/P endoscopy      MEDICATIONS  (STANDING):  chlorhexidine 2% Cloths 1 Application(s) Topical <User Schedule>  dextrose 40% Gel 15 Gram(s) Oral once  dextrose 50% Injectable 25 Gram(s) IV Push once  dextrose 50% Injectable 12.5 Gram(s) IV Push once  glucagon  Injectable 1 milliGRAM(s) IntraMuscular once  insulin lispro (ADMELOG) corrective regimen sliding scale   SubCutaneous every 6 hours  insulin NPH human recombinant 4 Unit(s) SubCutaneous <User Schedule>  lidocaine   4% Patch 1 Patch Transdermal daily  methimazole 20 milliGRAM(s) Oral <User Schedule>  metoclopramide Injectable 10 milliGRAM(s) IV Push every 8 hours  mirtazapine 7.5 milliGRAM(s) Oral daily  multivitamin 1 Tablet(s) Oral daily  pantoprazole  Injectable 40 milliGRAM(s) IV Push every 12 hours  propranolol 40 milliGRAM(s) Oral every 8 hours  sertraline 100 milliGRAM(s) Oral daily  sodium chloride 0.9%. 1000 milliLiter(s) (10 mL/Hr) IV Continuous <Continuous>  thiamine 100 milliGRAM(s) Oral daily    MEDICATIONS  (PRN):  ondansetron Injectable 4 milliGRAM(s) IV Push every 6 hours PRN Nausea and/or Vomiting  simethicone 80 milliGRAM(s) Chew every 8 hours PRN Gas  sodium chloride 0.9% lock flush 10 milliLiter(s) IV Push every 1 hour PRN Pre/post blood products, medications, blood draw, and to maintain line patency    ICU Vital Signs Last 24 Hrs  T(C): 38.3 (14 Sep 2021 04:00), Max: 38.3 (13 Sep 2021 12:00)  T(F): 100.9 (14 Sep 2021 04:00), Max: 100.9 (13 Sep 2021 12:00)  HR: 91 (14 Sep 2021 08:00) (85 - 100)  BP: 102/67 (13 Sep 2021 12:15) (102/67 - 102/67)  BP(mean): 80 (13 Sep 2021 12:15) (80 - 80)  ABP: 93/58 (14 Sep 2021 08:00) (87/55 - 136/103)  ABP(mean): 69 (14 Sep 2021 08:00) (66 - 114)  RR: 32 (14 Sep 2021 08:00) (18 - 47)  SpO2: 99% (14 Sep 2021 08:00) (98% - 100%)    Orthostatic VS    Daily     Daily   I&O's Summary    13 Sep 2021 07:01  -  14 Sep 2021 07:00  --------------------------------------------------------  IN: 1765 mL / OUT: 1005 mL / NET: 760 mL    14 Sep 2021 07:01  -  14 Sep 2021 08:24  --------------------------------------------------------  IN: 55 mL / OUT: 0 mL / NET: 55 mL        PHYSICAL EXAM:  Appearance: No Acute Distress, thin-appearing  HEENT: JVP non elevated  Cardiovascular: VAD hum  Respiratory: Clear to auscultation bilaterally  Gastrointestinal: mildly distended, tenderness in LLQ  Skin: no skin lesions  Neurologic: Non-focal  Extremities: No LE edema, warm and well perfused  Psychiatry: Alert  Lines/drains: perc dawn drain in place, brownish contents, no bloody contents    LVAD Interrogation  VAD TYPE HM 2   Speed 9200  Flow 5.9  Power 5.9  PI 5.1  Assessment of driveline exit site: driveline dressing c/d/i  Programming changes: no changes made    LABS:                        9.7    21.31 )-----------( 143      ( 14 Sep 2021 01:25 )             30.0       09-14    133<L>  |  99  |  11  ----------------------------<  160<H>  4.1   |  23  |  0.42<L>    Ca    8.5      14 Sep 2021 00:33  Phos  2.6     09-14  Mg     1.6     09-14    TPro  6.0  /  Alb  2.8<L>  /  TBili  1.2  /  DBili  x   /  AST  36  /  ALT  60<H>  /  AlkPhos  215<H>  09-14        BNP:   proBNP:   Lipid Profile: Cholesterol 153 mg/dL, Triglyceride 141 mg/dL, LDL 91 mg/dL, HDL 34 mg/dL, [08-15-21]  Cholesterol --, Triglyceride 679 mg/dL, LDL --, HDL --, [07-28-21]  Cholesterol --, Triglyceride 145 mg/dL, LDL --, HDL --, [06-27-21]  Cholesterol --, Triglyceride 191 mg/dL, LDL --, HDL --, [06-26-21]  Cholesterol --, Triglyceride 183 mg/dL, LDL --, HDL --, [06-25-21]  Cholesterol --, Triglyceride 518 mg/dL, LDL --, HDL --, [06-22-21]      HgA1c:   TSH: Thyroid Stimulating Hormone, Serum: <0.01 uIU/mL (09-07-21 @ 05:43)    Lactate Dehydrogenase, Serum: 211 U/L (09-13 @ 01:03)  Lactate Dehydrogenase, Serum: 201 U/L (09-12 @ 00:18)  Lactate Dehydrogenase, Serum: 167 U/L (09-11 @ 00:53)      RADIOLOGY & ADDITIONAL STUDIES:    Cardiovascular Diagnostic Testing    ECG: Personally reviewed    Telemetry: occasional PVC's    Echo: Personally reviewed (7/2021)      Stress Testing: none    Cath: reviewed (2017)    CXR: Personally reviewed  < from: Xray Chest 1 View- PORTABLE-Routine (Xray Chest 1 View- PORTABLE-Routine in AM.) (09.14.21 @ 02:17) >  IMPRESSION:    The heart is normal in size. The lungs appear to be clear. A left ventricular assisted device is in good position. NG tube is in the stomach however its tip is not seen on the current study. A loop recorder is lying the left hemithorax. No change in appearance of the chest when compared to previous study done September 13, 2021 at 2:52 AM.    --- End of Report ---      < end of copied text >    Other cardiac imaging: reviewed (2017)    Other misc imaging:   < from: CT Angio Abdomen and Pelvis w/ IV Cont (09.12.21 @ 16:48) >  IMPRESSION:  Moderate sized left-sided retroperitoneal hematoma with intraperitoneal extension.    2 cm segment of right femoral artery occlusion with distal reconstitution.    No evidence of pseudoaneurysm.    Above findings were discussed with CLAUDIA Olivas at 7:12 PM on 9/12/2021 by Ilda Singh MD.    --- End of Report ---    < end of copied text >

## 2021-09-14 NOTE — PROGRESS NOTE ADULT - SUBJECTIVE AND OBJECTIVE BOX
Admitted for: Anemia     Following for: Hyperthyroidism    Subjective:       MEDICATIONS  (STANDING):  cefepime   IVPB 1000 milliGRAM(s) IV Intermittent every 8 hours  cefepime   IVPB      chlorhexidine 2% Cloths 1 Application(s) Topical <User Schedule>  dextrose 40% Gel 15 Gram(s) Oral once  dextrose 50% Injectable 25 Gram(s) IV Push once  dextrose 50% Injectable 12.5 Gram(s) IV Push once  glucagon  Injectable 1 milliGRAM(s) IntraMuscular once  insulin lispro (ADMELOG) corrective regimen sliding scale   SubCutaneous every 6 hours  insulin NPH human recombinant 4 Unit(s) SubCutaneous <User Schedule>  lidocaine   4% Patch 1 Patch Transdermal daily  methimazole 20 milliGRAM(s) Oral <User Schedule>  metoclopramide Injectable 10 milliGRAM(s) IV Push every 8 hours  mirtazapine 7.5 milliGRAM(s) Oral daily  multivitamin 1 Tablet(s) Oral daily  pantoprazole  Injectable 40 milliGRAM(s) IV Push every 12 hours  propranolol 40 milliGRAM(s) Oral every 8 hours  sertraline 100 milliGRAM(s) Oral daily  sodium chloride 0.9%. 1000 milliLiter(s) (10 mL/Hr) IV Continuous <Continuous>  thiamine 100 milliGRAM(s) Oral daily  vancomycin  IVPB      vancomycin  IVPB 500 milliGRAM(s) IV Intermittent once    MEDICATIONS  (PRN):  ondansetron Injectable 4 milliGRAM(s) IV Push every 6 hours PRN Nausea and/or Vomiting  simethicone 80 milliGRAM(s) Chew every 8 hours PRN Gas  sodium chloride 0.9% lock flush 10 milliLiter(s) IV Push every 1 hour PRN Pre/post blood products, medications, blood draw, and to maintain line patency      Allergies    Amiodarone Hydrochloride (Other (Severe))    Intolerances        PHYSICAL EXAM:  VITALS: T(C): 37.9 (09-14-21 @ 08:00)  T(F): 100.3 (09-14-21 @ 08:00), Max: 100.9 (09-14-21 @ 04:00)  HR: 87 (09-14-21 @ 14:00) (86 - 100)  BP: --  RR:  (20 - 43)  SpO2:  (98% - 100%)  Wt(kg): --  GENERAL: NAD  EYES: No proptosis, no lid lag, anicteric  HEENT:  Atraumatic  THYROID: Normal size, no palpable nodules  RESPIRATORY: Clear to auscultation bilaterally  CARDIOVASCULAR: Regular rate and rhythm; No murmurs; no peripheral edema  GI: Soft, nontender, non distended, normal bowel sounds  SKIN: Dry  PSYCH: Alert and oriented x 3, flat affect      A1C with Estimated Average Glucose Result: 5.4 % (08-15-21 @ 13:52)  A1C with Estimated Average Glucose Result: 5.6 % (06-15-21 @ 02:42)      POCT Blood Glucose.: 125 mg/dL (09-14-21 @ 11:48)  POCT Blood Glucose.: 134 mg/dL (09-14-21 @ 05:36)  POCT Blood Glucose.: 179 mg/dL (09-14-21 @ 00:01)  POCT Blood Glucose.: 124 mg/dL (09-13-21 @ 17:41)  POCT Blood Glucose.: 147 mg/dL (09-13-21 @ 11:54)  POCT Blood Glucose.: 56 mg/dL (09-13-21 @ 11:51)  POCT Blood Glucose.: 78 mg/dL (09-13-21 @ 05:31)  POCT Blood Glucose.: 134 mg/dL (09-12-21 @ 23:51)  POCT Blood Glucose.: 107 mg/dL (09-12-21 @ 18:42)  POCT Blood Glucose.: 87 mg/dL (09-12-21 @ 17:48)  POCT Blood Glucose.: 70 mg/dL (09-12-21 @ 17:20)  POCT Blood Glucose.: 68 mg/dL (09-12-21 @ 17:19)  POCT Blood Glucose.: 154 mg/dL (09-12-21 @ 11:39)  POCT Blood Glucose.: 137 mg/dL (09-12-21 @ 05:32)  POCT Blood Glucose.: 132 mg/dL (09-11-21 @ 23:58)  POCT Blood Glucose.: 129 mg/dL (09-11-21 @ 17:11)      09-14    133<L>  |  99  |  11  ----------------------------<  160<H>  4.1   |  23  |  0.42<L>    EGFR if : 142  EGFR if non : 122    Ca    8.5      09-14  Mg     1.6     09-14  Phos  2.6     09-14    TPro  6.0  /  Alb  2.8<L>  /  TBili  1.2  /  DBili  x   /  AST  36  /  ALT  60<H>  /  AlkPhos  215<H>  09-14      Thyroid Function Tests:  09-13 @ 17:30 TSH -- FreeT4 3.4 T3 -- Anti TPO -- Anti Thyroglobulin Ab -- TSI --  09-09 @ 10:34 TSH -- FreeT4 5.0 T3 110 Anti TPO -- Anti Thyroglobulin Ab -- TSI --                         Admitted for: Anemia     Following for: Hyperthyroidism    Subjective: Patient endorses ongoing abdominal pain. Otherwise, nods head no to other complaints       MEDICATIONS  (STANDING):  cefepime   IVPB 1000 milliGRAM(s) IV Intermittent every 8 hours  cefepime   IVPB      chlorhexidine 2% Cloths 1 Application(s) Topical <User Schedule>  dextrose 40% Gel 15 Gram(s) Oral once  dextrose 50% Injectable 25 Gram(s) IV Push once  dextrose 50% Injectable 12.5 Gram(s) IV Push once  glucagon  Injectable 1 milliGRAM(s) IntraMuscular once  insulin lispro (ADMELOG) corrective regimen sliding scale   SubCutaneous every 6 hours  insulin NPH human recombinant 4 Unit(s) SubCutaneous <User Schedule>  lidocaine   4% Patch 1 Patch Transdermal daily  methimazole 20 milliGRAM(s) Oral <User Schedule>  metoclopramide Injectable 10 milliGRAM(s) IV Push every 8 hours  mirtazapine 7.5 milliGRAM(s) Oral daily  multivitamin 1 Tablet(s) Oral daily  pantoprazole  Injectable 40 milliGRAM(s) IV Push every 12 hours  propranolol 40 milliGRAM(s) Oral every 8 hours  sertraline 100 milliGRAM(s) Oral daily  sodium chloride 0.9%. 1000 milliLiter(s) (10 mL/Hr) IV Continuous <Continuous>  thiamine 100 milliGRAM(s) Oral daily  vancomycin  IVPB      vancomycin  IVPB 500 milliGRAM(s) IV Intermittent once    MEDICATIONS  (PRN):  ondansetron Injectable 4 milliGRAM(s) IV Push every 6 hours PRN Nausea and/or Vomiting  simethicone 80 milliGRAM(s) Chew every 8 hours PRN Gas  sodium chloride 0.9% lock flush 10 milliLiter(s) IV Push every 1 hour PRN Pre/post blood products, medications, blood draw, and to maintain line patency      Allergies    Amiodarone Hydrochloride (Other (Severe))    Intolerances        PHYSICAL EXAM:  VITALS: T(C): 37.9 (09-14-21 @ 08:00)  T(F): 100.3 (09-14-21 @ 08:00), Max: 100.9 (09-14-21 @ 04:00)  HR: 87 (09-14-21 @ 14:00) (86 - 100)  BP: --  RR:  (20 - 43)  SpO2:  (98% - 100%)  Wt(kg): --  GENERAL: NAD, NGT in place  EYES: No proptosis, no lid lag, anicteric  HEENT:  Atraumatic  THYROID: trach in place, unable to palpate  RESPIRATORY: Clear to auscultation bilaterally  CARDIOVASCULAR: Regular rate and rhythm; No murmurs; no peripheral edema  GI: tenderness to palpation of abdomen  SKIN: Dry  PSYCH: Alert and oriented x 3, flat affect      A1C with Estimated Average Glucose Result: 5.4 % (08-15-21 @ 13:52)  A1C with Estimated Average Glucose Result: 5.6 % (06-15-21 @ 02:42)      POCT Blood Glucose.: 125 mg/dL (09-14-21 @ 11:48)  POCT Blood Glucose.: 134 mg/dL (09-14-21 @ 05:36)  POCT Blood Glucose.: 179 mg/dL (09-14-21 @ 00:01)  POCT Blood Glucose.: 124 mg/dL (09-13-21 @ 17:41)  POCT Blood Glucose.: 147 mg/dL (09-13-21 @ 11:54)  POCT Blood Glucose.: 56 mg/dL (09-13-21 @ 11:51)  POCT Blood Glucose.: 78 mg/dL (09-13-21 @ 05:31)  POCT Blood Glucose.: 134 mg/dL (09-12-21 @ 23:51)  POCT Blood Glucose.: 107 mg/dL (09-12-21 @ 18:42)  POCT Blood Glucose.: 87 mg/dL (09-12-21 @ 17:48)  POCT Blood Glucose.: 70 mg/dL (09-12-21 @ 17:20)  POCT Blood Glucose.: 68 mg/dL (09-12-21 @ 17:19)  POCT Blood Glucose.: 154 mg/dL (09-12-21 @ 11:39)  POCT Blood Glucose.: 137 mg/dL (09-12-21 @ 05:32)  POCT Blood Glucose.: 132 mg/dL (09-11-21 @ 23:58)  POCT Blood Glucose.: 129 mg/dL (09-11-21 @ 17:11)      09-14    133<L>  |  99  |  11  ----------------------------<  160<H>  4.1   |  23  |  0.42<L>    EGFR if : 142  EGFR if non : 122    Ca    8.5      09-14  Mg     1.6     09-14  Phos  2.6     09-14    TPro  6.0  /  Alb  2.8<L>  /  TBili  1.2  /  DBili  x   /  AST  36  /  ALT  60<H>  /  AlkPhos  215<H>  09-14      Thyroid Function Tests:  09-13 @ 17:30 TSH -- FreeT4 3.4 T3 -- Anti TPO -- Anti Thyroglobulin Ab -- TSI --  09-09 @ 10:34 TSH -- FreeT4 5.0 T3 110 Anti TPO -- Anti Thyroglobulin Ab -- TSI --                         Admitted for: Anemia     Following for: Hyperthyroidism    Subjective: Patient endorses ongoing abdominal pain. Otherwise, nods head no to other complaints       MEDICATIONS  (STANDING):  cefepime   IVPB 1000 milliGRAM(s) IV Intermittent every 8 hours  cefepime   IVPB      chlorhexidine 2% Cloths 1 Application(s) Topical <User Schedule>  dextrose 40% Gel 15 Gram(s) Oral once  dextrose 50% Injectable 25 Gram(s) IV Push once  dextrose 50% Injectable 12.5 Gram(s) IV Push once  glucagon  Injectable 1 milliGRAM(s) IntraMuscular once  insulin lispro (ADMELOG) corrective regimen sliding scale   SubCutaneous every 6 hours  insulin NPH human recombinant 4 Unit(s) SubCutaneous <User Schedule>  lidocaine   4% Patch 1 Patch Transdermal daily  methimazole 20 milliGRAM(s) Oral <User Schedule>  metoclopramide Injectable 10 milliGRAM(s) IV Push every 8 hours  mirtazapine 7.5 milliGRAM(s) Oral daily  multivitamin 1 Tablet(s) Oral daily  pantoprazole  Injectable 40 milliGRAM(s) IV Push every 12 hours  propranolol 40 milliGRAM(s) Oral every 8 hours  sertraline 100 milliGRAM(s) Oral daily  sodium chloride 0.9%. 1000 milliLiter(s) (10 mL/Hr) IV Continuous <Continuous>  thiamine 100 milliGRAM(s) Oral daily  vancomycin  IVPB      vancomycin  IVPB 500 milliGRAM(s) IV Intermittent once    MEDICATIONS  (PRN):  ondansetron Injectable 4 milliGRAM(s) IV Push every 6 hours PRN Nausea and/or Vomiting  simethicone 80 milliGRAM(s) Chew every 8 hours PRN Gas  sodium chloride 0.9% lock flush 10 milliLiter(s) IV Push every 1 hour PRN Pre/post blood products, medications, blood draw, and to maintain line patency          PHYSICAL EXAM:  VITALS: T(C): 37.9 (09-14-21 @ 08:00)  T(F): 100.3 (09-14-21 @ 08:00), Max: 100.9 (09-14-21 @ 04:00)  HR: 87 (09-14-21 @ 14:00) (86 - 100)  BP: --  RR:  (20 - 43)  SpO2:  (98% - 100%)  Wt(kg): --  GENERAL: NAD, NGT in place  THYROID: trach in place, unable to palpate  RESPIRATORY: Clear to auscultation bilaterally  CARDIOVASCULAR: Regular rate and rhythm; No murmurs; no peripheral edema  GI: tenderness to palpation of abdomen  PSYCH: Alert and oriented x 3, flat affect      A1C with Estimated Average Glucose Result: 5.4 % (08-15-21 @ 13:52)  A1C with Estimated Average Glucose Result: 5.6 % (06-15-21 @ 02:42)      POCT Blood Glucose.: 125 mg/dL (09-14-21 @ 11:48)  POCT Blood Glucose.: 134 mg/dL (09-14-21 @ 05:36)  POCT Blood Glucose.: 179 mg/dL (09-14-21 @ 00:01)  POCT Blood Glucose.: 124 mg/dL (09-13-21 @ 17:41)  POCT Blood Glucose.: 147 mg/dL (09-13-21 @ 11:54)  POCT Blood Glucose.: 56 mg/dL (09-13-21 @ 11:51)  POCT Blood Glucose.: 78 mg/dL (09-13-21 @ 05:31)  POCT Blood Glucose.: 134 mg/dL (09-12-21 @ 23:51)  POCT Blood Glucose.: 107 mg/dL (09-12-21 @ 18:42)  POCT Blood Glucose.: 87 mg/dL (09-12-21 @ 17:48)  POCT Blood Glucose.: 70 mg/dL (09-12-21 @ 17:20)  POCT Blood Glucose.: 68 mg/dL (09-12-21 @ 17:19)  POCT Blood Glucose.: 154 mg/dL (09-12-21 @ 11:39)  POCT Blood Glucose.: 137 mg/dL (09-12-21 @ 05:32)  POCT Blood Glucose.: 132 mg/dL (09-11-21 @ 23:58)  POCT Blood Glucose.: 129 mg/dL (09-11-21 @ 17:11)      09-14    133<L>  |  99  |  11  ----------------------------<  160<H>  4.1   |  23  |  0.42<L>    EGFR if : 142  EGFR if non : 122    Ca    8.5      09-14  Mg     1.6     09-14  Phos  2.6     09-14    TPro  6.0  /  Alb  2.8<L>  /  TBili  1.2  /  DBili  x   /  AST  36  /  ALT  60<H>  /  AlkPhos  215<H>  09-14      Thyroid Function Tests:  09-13 @ 17:30 TSH -- FreeT4 3.4   09-09 @ 10:34 TSH -- FreeT4 5.0 T3

## 2021-09-14 NOTE — PROGRESS NOTE ADULT - SUBJECTIVE AND OBJECTIVE BOX
SURGERY PROGRESS NOTE    SUBJECTIVE / 24H EVENTS:  Patient seen and examined on morning rounds. No acute events overnight. H/H remains stable. Patient continues to deny LLE numbness/tingling      OBJECTIVE:  VITAL SIGNS:  T(C): 37.9 (21 @ 08:00), Max: 38.3 (21 @ 12:00)  HR: 92 (21 @ 09:06) (85 - 100)  BP: 102/67 (21 @ 12:15) (102/67 - 102/67)  RR: 34 (21 @ 09:06) (20 - 47)  SpO2: 100% (21 @ 09:06) (98% - 100%)  Daily     Daily   POCT Blood Glucose.: 134 mg/dL (21 @ 05:36)  POCT Blood Glucose.: 179 mg/dL (21 @ 00:01)  POCT Blood Glucose.: 124 mg/dL (21 @ 17:41)      PHYSICAL EXAM:  Gen: NAD  LS: Respirations unlabored on RA  GI: Soft. Nondistended. Mild tenderness to palpation in bilateral lower quadrants, improved tenderness on left flank  Groin: L groin dressing c/d/i. Mildly tender to palpation but no swelling, ecchymosis or palpable mass. Palpable femoral pulse  Ext: Warm, well perfused. Sensation grossly intact in bilateral lower extremities. Bilateral palpable DP pulses      21 @ 07:01  -  21 @ 07:00  --------------------------------------------------------  IN:    Enteral Tube Flush: 320 mL    IV PiggyBack: 150 mL    Miscellaneous Tube Feedin mL    sodium chloride 0.9%: 240 mL  Total IN: 1765 mL    OUT:    Drain (mL): 200 mL    Indwelling Catheter - Urethral (mL): 205 mL    Voided (mL): 600 mL  Total OUT: 1005 mL    Total NET: 760 mL      21 @ 07:01  -  21 @ 09:36  --------------------------------------------------------  IN:    Miscellaneous Tube Feedin mL    sodium chloride 0.9%: 10 mL  Total IN: 55 mL    OUT:  Total OUT: 0 mL    Total NET: 55 mL          LAB VALUES:      133<L>  |  99  |  11  ----------------------------<  160<H>  4.1   |  23  |  0.42<L>    Ca    8.5      14 Sep 2021 00:33  Phos  2.6       Mg     1.6         TPro  6.0  /  Alb  2.8<L>  /  TBili  1.2  /  DBili  x   /  AST  36  /  ALT  60<H>  /  AlkPhos  215<H>                                 9.7    21.31 )-----------( 143      ( 14 Sep 2021 01:25 )             30.0     LIVER FUNCTIONS - ( 14 Sep 2021 00:33 )  Alb: 2.8 g/dL / Pro: 6.0 g/dL / ALK PHOS: 215 U/L / ALT: 60 U/L / AST: 36 U/L / GGT: x             ABG - ( 14 Sep 2021 00:07 )  pH, Arterial: 7.41  pH, Blood: x     /  pCO2: 41    /  pO2: 124   / HCO3: 26    / Base Excess: 1.2   /  SaO2: 99.5                      MICROBIOLOGY:      RADIOLOGY:  PACS Image: Image(s) Available (21 @ 02:17)        MEDICATIONS  (STANDING):  chlorhexidine 2% Cloths 1 Application(s) Topical <User Schedule>  dextrose 40% Gel 15 Gram(s) Oral once  dextrose 50% Injectable 25 Gram(s) IV Push once  dextrose 50% Injectable 12.5 Gram(s) IV Push once  glucagon  Injectable 1 milliGRAM(s) IntraMuscular once  insulin lispro (ADMELOG) corrective regimen sliding scale   SubCutaneous every 6 hours  insulin NPH human recombinant 4 Unit(s) SubCutaneous <User Schedule>  lidocaine   4% Patch 1 Patch Transdermal daily  methimazole 20 milliGRAM(s) Oral <User Schedule>  metoclopramide Injectable 10 milliGRAM(s) IV Push every 8 hours  mirtazapine 7.5 milliGRAM(s) Oral daily  multivitamin 1 Tablet(s) Oral daily  pantoprazole  Injectable 40 milliGRAM(s) IV Push every 12 hours  propranolol 40 milliGRAM(s) Oral every 8 hours  sertraline 100 milliGRAM(s) Oral daily  sodium chloride 0.9%. 1000 milliLiter(s) (10 mL/Hr) IV Continuous <Continuous>  thiamine 100 milliGRAM(s) Oral daily    MEDICATIONS  (PRN):  ondansetron Injectable 4 milliGRAM(s) IV Push every 6 hours PRN Nausea and/or Vomiting  simethicone 80 milliGRAM(s) Chew every 8 hours PRN Gas  sodium chloride 0.9% lock flush 10 milliLiter(s) IV Push every 1 hour PRN Pre/post blood products, medications, blood draw, and to maintain line patency

## 2021-09-14 NOTE — PROGRESS NOTE ADULT - PROBLEM SELECTOR PLAN 2
-hypoglycemia at lunch was erroneous as noted by the repeat measure  -decrease NPH to 4 units sq q6  -small correctional scale q6  -discussed with RN and covering NP Evelia Betaty MD  Endocrinology Attending  Mondays and Tuesdays 9am-6pm: [phone] (pager)  Other days, night and weekend: [phone]

## 2021-09-14 NOTE — PROGRESS NOTE ADULT - SUBJECTIVE AND OBJECTIVE BOX
RICKY JOINT  MRN#: [id number]  Subjective:  Pulmonary progress  : recurrent Acute hypoxic respiratory Failure ,aspiration pneumonia, NICM  , chart reviewed and H/O obtained radiological and Laboratory study reviewed patient Examined     65M PMH ACC/AHA stage D HF due to NICM HM2 LVAD , TV annuloplasty ring 17 as destination therapy due to severe peripheral artery disease with significant stenosis  SIADH, Depression, CKD-3 with hyperkalemia, past E. coli UTIs, driveline drainage (21) and COVID-19 (back in 2020)  He was recently seen in clinic where he complained of abdominal pain and dark stools w constipation back in May. He presents to [facility] ER today weakness and fatigue, moderate and + Black stools for three days, on coumadin secondary to warfarin use in the setting of an LVAD. Patient has required transfusions for GIB in the past. Mostly recently back in 2021 pt had anemia with dark stools. No interventions was done at that time. However Last Endoscopy was done in 2020 (negative). Today labs show patient is anemic with H/H of 4.5/16.3,. INR is 8.84 MAP in the 90s, Temp 35.1. He denies any chest pain, shortness of breath, dizziness, abd pain, nausea or vomiting. found to have  rectal bleeding underwent endoscopy ,old blood in the proximal ileum ,  develop sepsis with LL opacity given Antibiotics , Extubated , reintubated , Bronchoscopy on Zosyn for LL pneumonia  and Amiodarone S/P TV Annuloplasty , patient remain intubated on full ventilatory support .S/P multiple units of blood transfusion , remain on full ventilatory support on Precedex and propofol , new central IJ line , diarrhea C diff. +ve on po vancomycin and IV Flagyl,  mildly distended belly , fever start on cefepime 2gm q 8 hrs S/P tracheostomy .  new RT Subclavian central line continue on contact  isolation ,still diarrhea on C-diff antibiotics ENT follow up appreciated , trial of C-PAP as tolerated , , copious secretion from trach. site chest x ray left lower lobe pneumonia , tolerating trch. collar 50% FI02 still excessive secretion need pulmonary toilet , off Ancef antibiotic , no more diarrhea back on full support mechanical ventilator , chest x ray show improvement in LLL air space disease, more awake and responsive on tube feeding no more diarrhea , S/P  Ancef for bacteremia no fever ,ID follow up noted ,  no nausea or vomiting or diarrhea still very weak and tired , event note pulled NG tube now replaced , back on tube feeding ,still on po vancomycin , getting PT and OT at bed side , no plan for decannulation for now. , no more diarrhea receiving PT and OPT at bed side , minimal secretion from tracheostomy site , no SOB getting stronger , improve muscle tone patient transfer to monitor bed still on contact isolation for C-Difficel colitis on 50% FI02, NG tube clogged , and change to resume tube feeding still loose stool . H/H drop significantly require blood transfusion , most likely GI bleeding , IV heparin D/C , vomiting 200 cc of creamy color tube feeding on hold no sob, still melena monitor in the CTU possible capsule endoscopy , H/H is stable ., patient develop TR sided  pneumothorax require chest tube placement , RT IJ central line  placed , develop fever shaking chills , blood culture positive for serratia marcescens , start on cefepime .the patient  become hypoxic and hypotensive placed on full ventilatory support and Vasopressin , levophed and Dobutamine ,S/P blood transfusion on meropenem and vancomycin ,   , on and  off pressors , occasional agitation on Precedex .S/P IR cholecystostomy tube drainage placement in the RT upper Quadrant , resume anticoagulation chest x ray noted C-PAP trail lasted only for 2 hrs , new RT SC line and D/C RT IJ line , RT pig tail cathter has been removed , tolerating C-PAP trial placed on trach. collar 50% FI02 GI consultant noted on NG tube feeding as tolerated , develop AF RVR S/P  bolus Amiodarone  back to regular sinus Rhythm , Flat Affect depressed , back on tube feeding Vital AF at 60 cc/ hr .still intermittent abdominal pain , no fever saturation is accepted  back on full ventilatory support , tired and sleepy on round  .start  back on  tube feeding . tolerating it very well , no nausea or vomiting , good 02 saturation on trac-collar on methimazole for hyperthyroidism , no new C/O no plan for decannulation yet , electrolyte blood test is still pending .on respiratory isolation sputum culture is negative , increase WBC  improved on cefepime , sputum positive for enterococcus , condition is the same ,still C/O Abdominal pain , white count is improving , no chest pain or SOB ,  .placed on Reglan 10 mg TID for gastric motility , depressed , withdrawn. on full NG tube diet with Vital , secretion are much improved , went for mesenteric angiogram , unable to stent SMA S/P 3 units of blood transfusion on trach. collar 40% Fi02, anxious to eat discuss possible decannulation,  H/H  are stable vascular  note appreciated no mediate plan for repeat mesenteric angiogram remain on trach.-collar at 40% FI02       (2021 16:57)    PAST MEDICAL & SURGICAL HISTORY:  CHF (congestive heart failure)    CAD (coronary artery disease)  Depression    Pleural effusion    History of 2019 novel coronavirus disease (COVID-19)  2020    Hemorrhoids    Bleeding hemorrhoids    Peripheral arterial disease    Claudication    BPH with urinary obstruction    ACC/AHA stage D systolic heart failure  Anticoagulation goal of INR 2.0 to 2.5    Falls    Clavicle fracture    CKD (chronic kidney disease), stage III    Iron deficiency anemia    H/O epistaxis    Vertigo    GI bleed    S/P TVR (tricuspid valve repair)    S/P ventricular assist device    S/P endoscopy    OBJECTIVE:  ICU Vital Signs Last 24 Hrs  T(C): 38.2 (2021 10:00), Max: 38.5 (2021 12:00)  T(F): 100.8 (2021 10:00), Max: 101.3 (2021 12:00)  HR: 65 (2021 10:00) (61 - 69)  BP: --  BP(mean): --  ABP: 105/67 (2021 10:00) (90/54 - 113/64)  ABP(mean): 77 (2021 10:00) (63 - 77)  RR: 20 (2021 10:00) (19 - 35)  SpO2: 99% (2021 10:00) (96% - 100%)       @ 07:01  -   @ 07:00  --------------------------------------------------------  IN: 2693.9 mL / OUT: 1415 mL / NET: 1278.9 mL     @ 07: @ 10:49  --------------------------------------------------------  IN: 420.8 mL / OUT: 115 mL / NET: 305.8 mL  PHYSICAL EXAM:Daily   Elderly male S/P tracheostomy on full ventilatory support  50% FI02 ,  Daily Weight in k.4 (2021 00:00)  HEENT:     + NCAT  + EOMI  - Conjuctival edema   - Icterus   - Thrush   - ETT  + NGT/OGT  Neck:         + FROM  RT IJ  line JVD  - Nodes - Masses + Mid-line trachea + Tracheostomy  Chest:            normal A-P diameter    Lungs:          + CTA   + Rhonchi    - Rales    - Wheezing + Decreased  LT BS   - Dullness R L  Cardiac:       + S1 + S2    + RRR   - Irregular   - S3  - S4    - Murmurs   - Rub   - Hamman’s sign   Abdomen:    + BS  + Soft + Non-tender - Distended - Organomegaly - PEG .cholecystostomy tube in place  Extremities:   - Cyanosis U/L   - Clubbing  U/L  + LE/UE Edema   + Capillary refill    + Pulses   Neuro:        - Awake   -  Alert   - Confused   - Lethargic   + Sedated  + Generalized Weakness  Skin:        - Rashes    - Erythema   + Normal incisions   + IV sites intact          HOSPITAL MEDICATIONS: All medications reviewed and analyzed  MEDICATIONS  (STANDING):  amiodarone    Tablet 200 milliGRAM(s) Oral daily  chlorhexidine 0.12% Liquid 15 milliLiter(s) Oral Mucosa every 12 hours  chlorhexidine 2% Cloths 1 Application(s) Topical <User Schedule>  dexmedetomidine Infusion 0.5 MICROgram(s)/kG/Hr (9.81 mL/Hr) IV Continuous <Continuous>  dextrose 50% Injectable 50 milliLiter(s) IV Push every 15 minutes  heparin  Infusion 400 Unit(s)/Hr (12.5 mL/Hr) IV Continuous <Continuous>  Hydromorphone  Injectable 0.5 milliGRAM(s) IV Push once  insulin lispro (ADMELOG) corrective regimen sliding scale   SubCutaneous every 6 hours  pantoprazole  Injectable 40 milliGRAM(s) IV Push every 12 hours  piperacillin/tazobactam IVPB.. 3.375 Gram(s) IV Intermittent every 8 hours  propofol Infusion 20 MICROgram(s)/kG/Min (9.42 mL/Hr) IV Continuous <Continuous>  sodium chloride 0.9% lock flush 3 milliLiter(s) IV Push every 8 hours  sodium chloride 0.9%. 1000 milliLiter(s) (10 mL/Hr) IV Continuous <Continuous>    MEDICATIONS  (PRN):  acetaminophen    Suspension .. 650 milliGRAM(s) Oral every 6 hours PRN Temp greater or equal to 38C (100.4F)    LABS: All Lab data reviewed and analyzed                        9.7     )-----------( 143      ( 14 Sep 2021 01:25 )             30.0       133<L>  |  99  |  11  ----------------------------<  160<H>  4.1   |  23  |  0.42<L>    Ca    8.5      14 Sep 2021 00:33  Phos  2.6       Mg     1.6         TPro  6.0  /  Alb  2.8<L>  /  TBili  1.2  /  DBili  x   /  AST  36  /  ALT  60<H>  /  AlkPhos  215<H>        Ca    9.2      13 Sep 2021 01:03  Phos  3.0       Mg     1.7         TPro  6.5  /  Alb  2.8<L>  /  TBili  0.6  /  DBili  x   /  AST  33  /  ALT  75<H>  /  AlkPhos  201<H>                                                                                                                                            PTT - ( 2021 04:52 )  PTT:45.2 sec LIVER FUNCTIONS - ( 2021 00:42 )  Alb: 3.4 g/dL / Pro: 6.7 g/dL / ALK PHOS: 213 U/L / ALT: 15 U/L / AST: 24 U/L / GGT: x           RADIOLOGY: - Reviewed and analyzed RT Pig tail cathter  , LVAD HM2, CT scan of abdomen reviewed result noted

## 2021-09-14 NOTE — PROGRESS NOTE ADULT - ASSESSMENT
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 coli UTIs, driveline drainage (1/7/21) and COVID-19 (back in April 2020)  He was recently seen in clinic where he complained of abdominal pain and dark stools w constipation back in May. He presents to [facility] ER today weakness and fatigue, moderate and + Black stools for three days, on coumadin secondary to warfarin use in the setting of an LVAD. Patient has required transfusions for GIB in the past. Mostly recently back in jan 2021 pt had anemia with dark stools. No interventions was done at that time. However Last Endoscopy was done in July 2020 (negative). Today labs show patient is anemic with H/H of 4.5/16.3,. INR is 8.84 MAP in the 90s,   Returned from endoscopy appearing ill tachypneic with chills with new white out of his left lung      A/p  s/p LVAD placement  admission prolonged following GI bleeding, aspiration event, CDI, VAP, chronic abdominal pain, most recently with retroperitoneal bleeding post attempted stent placement  low grade fevers and leukocytosis- possibly related to retroperitoneal bleeding vs infected hematoma vs alternative source  would send blood cultures x2 sets  CXR NAD  UA negative    monitor clinical signs/sxs   Will begin broad spectrum antibiotics with:  Vanco 500mg q12hr and Cefepime 1 gm q 8hr    Discussed with CTU    Morris Prater MD  [phone]  After 5pm/weekends [phone]          Morris Prater MD  [phone]  After 5pm/weekends [phone]

## 2021-09-14 NOTE — PROGRESS NOTE ADULT - PROBLEM SELECTOR PLAN 5
- postop angiogram H/H continue to drop. Intraop received 3uPRBC and post-op required additional 2uPRBC (last on 9/12).   - Continue to trend H/H and have active type and screen  - CT Angio 9/12 shows moderate sized left-sided retroperitoneal hematoma with intraperitoneal extension. 2 cm segment of right femoral artery occlusion with distal reconstitution.

## 2021-09-14 NOTE — PROGRESS NOTE ADULT - ASSESSMENT
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 coli UTIs, driveline drainage (1/7/21) and COVID-19, here initially for GIB prolonged hospital course, s/p tracheostomy, acalculous cholecystitis s/p perc dawn. Patient has persistent intermittent abdominal pain, and was being evaluated for chronic mesenteric ischemia vs SMA syndrome.  8/13 bld cxs positive. TPN was consulted for Protein Calorie Malnutrition, Prealb 11, prior a1c 5.6, tg 141. Mesenteric duplex showied borderline stenosis of prox SMA,  Pt now s/p Mesenteric angiogram 9/10 unable to place stent. Pt had been tolerating feeds but with intermittent abd pain. SLP eval had recommended NPO status. Course c/b left rph    -cont tube feeds and advance to goal as tolerated as per ctu (Vital 1.5 goal 65cc/hr); no plans for TPN at this time  -suspect hyperthyroidism contributing in part to metabolic issues ie. cAMP effects GI motility, hypercalcemia, tachycardia; improving, management as per endo  -sma stenosis- sp dx mesenteric angiogram by vascular but unable to pass stent, f/u further recs  -rph- cta ap noted, trend cbc, transfuse prn  -h/o abdominal pain/vomiting- as above, Zofran prn for nausea; Reglan for gut motility  -fever- care per ID, started on abx, f/u cxs  -electrolyte imbalance risk- monitor and replete elytes as needed  -management as per CTU; will remain available as needed    plan discussed with Dr. Soliz and Dr ANNE Tang, agreeable with above    TPN pager [phone], spectra [phone]  M-F 6A-4P, Weekends and holidays 8A-12P

## 2021-09-14 NOTE — PROGRESS NOTE ADULT - PROBLEM SELECTOR PLAN 7
- endocrine recs appreciated  - currently on methimazole, propranolol, and steroids  - Steroid taper as follows: 9/13: Hydrocortisone 25 mg q 12 hrs  9/14, 9/15: Hydrocortisone 25 mg daily, stop after 2 doses  Stop steroids thereafter.   - Continue to monitor TFT post angiogram - endocrine recs appreciated  - currently on methimazole, propranolol, and steroids  - Steroid taper completed  - Continue to monitor TFT post angiogram

## 2021-09-14 NOTE — PROGRESS NOTE ADULT - ATTENDING COMMENTS
Recovering after vascular complication (pseudoaneurysm and RP bleed) during mesenteric angiogram last week. Still with pain. Tolerating tube feeds. Will consider repeat attempt at SMA stenting once he recovers from acute events of last week. Pan culture today given rising WBC.

## 2021-09-14 NOTE — PROGRESS NOTE ADULT - ASSESSMENT
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 coli UTIs, driveline drainage (1/7/21) and COVID-19, here initially for GIB prolonged hospital course, s/p tracheostomy, acalculous cholecystitis s/p perc dawn. Patient has persistent intermittent abdominal pain, CTA showing possible proximal SMA stenosis. CTA poor quality limited by significant streak artifact. Mesenteric duplex velocities without evidence of significant stenosis, however study unreliable in the setting of patient's augmented systolic function given LVAD. Now s/p diagnostic mesenteric angiogram with unsuccessful SMA stenting. 09/12 CTA obtained due to downtrending H/H with evidence of L RP hematoma without evidence of active extravasation.     PLAN  - Monitor L groin for signs of PSA or hematoma (No signs at the moment)  - Monitor LLE neurovascular exam  - Trend H/H  - Continue excellent care per CTICU  - Plan for eventual RTOR for repeat mesenteric angiogram     Vascular Surgery  [id number]

## 2021-09-14 NOTE — CHART NOTE - NSCHARTNOTEFT_GEN_A_CORE
Pt seen at bedside for speaking valve trial and repeat swallow evaluation. Pt awake and alert, following directions, generally calm and cooperative for session. Pt with Portex 6 trach, cuff deflated, on FiO2 40% via TC. Pt with baseline VSS notable for RR 30s, O2 sat 100, HR 130s. PMV was placed on hub of trach, with VSS notable for O2 sat  HR 130s. RR initially remained unchanged, gradually slowed to the 20s and remained there during subsequent conversation.  RN reports that Pt has required endotracheal suctioning approximately every 2 hours today. Pt with c/o nausea, stated he did not wish to continue session. RN provided zofran, but Pt continued to decline to participate in speech trial. Pt also declined trials of ice chips. Purposeful proactive rounding reinforced and 5 Ps addressed. Pt left in no distress.     Pt with gradually improving tolerance of PMV trials; reduced secretion management compared to yesterday.  Will continue to follow for dysphagia therapy and PMV trials. Pt would benefit from additional time and therapy prior to repeat assessment.  Findings d/w CLAUDIA Stiles, DILIP and Pt.    RECOMMENDATIONS  1) Continue NPO, with non-oral nutrition/hydration/medications.   2) Strict aspiration and reflux precautions for secretions and enteral feeds.  3) Maintain good oral hygiene.   4) Restorative swallow therapy, and PMV trials with SLP only. Will continue to follow while patient is in-house, as schedule permits.   5) d/c tbd pending hospital course.    Gege Hernandez MA, CCC-SLP  Speech-Language Pathologist  pager #[phone]

## 2021-09-14 NOTE — PROGRESS NOTE ADULT - ASSESSMENT
67 YO M with a history of stage D NICM s/p HM2 on 9/2017 as DT (due to severe PAD) with TV ring, prior COVID-19 infection 4/2020, recurrent syncope post LVAD s/p ILR, and chronic abdominal pain with prior negative workup who was admitted 6/14/21 with symptomatic anemia with Hgb 4.5 in setting of INR 8.8 without hemodynamic instability and has since had a protracted hospitalization. He was transfused and underwent VCE which showed active bleeding in the mid small bowel but subsequent enteroscopy 6/15 did not reveal any active bleeding. He acutely decompensated after procedure with fever/hypertension, low flow alarms, and pulmonary infiltrate with hypoxia requiring intubation from probable aspiration PNA. He was unable to extubated and has since undergone tracheostomy but tolerating persistent trach collar and nearing ability for decannulation. 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 Short term goal is maintenance of adequate nutrition and eventual trach decannulation.     Underwent mesenteric angiogram on 9/10 with vascular surgery. Found to have SMA stenosis at its origin but unable to place stent. Intra-op received 3uPRBC and required fem stop placement for L pseudoaneurysm. Post-op continued to have drop in H/H requiring additional 2u PRBC (last on 9/12), CT angio revealed left-sided retroperitoneal hematoma. Recently he developed worsening leukocytosis and low grade fevers, will be PAN Cx and ID will follow.           65 YO M with a history of stage D NICM s/p HM2 on 9/2017 as DT (due to severe PAD) with TV ring, prior COVID-19 infection 4/2020, recurrent syncope post LVAD s/p ILR, and chronic abdominal pain with prior negative workup who was admitted 6/14/21 with symptomatic anemia with Hgb 4.5 in setting of INR 8.8 without hemodynamic instability and has since had a protracted hospitalization. He was transfused and underwent VCE which showed active bleeding in the mid small bowel but subsequent enteroscopy 6/15 did not reveal any active bleeding. He acutely decompensated after procedure with fever/hypertension, low flow alarms, and pulmonary infiltrate with hypoxia requiring intubation from probable aspiration PNA. He was unable to extubated and has since undergone tracheostomy but tolerating persistent trach collar and nearing ability for decannulation. 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 Short term goal is maintenance of adequate nutrition and eventual trach decannulation.     Underwent mesenteric angiogram on 9/10 with vascular surgery. Found to have SMA stenosis at its origin but unable to place stent. Intra-op received 3uPRBC and required fem stop placement for L pseudoaneurysm. Post-op continued to have drop in H/H requiring additional 2u PRBC (last on 9/12), CT angio revealed left-sided retroperitoneal hematoma. Recently he developed worsening leukocytosis and low grade fevers, will be PAN Cx, considering broad spectrum abx, and ID will follow.           65 YO M with a history of stage D NICM s/p HM2 on 9/2017 as DT (due to severe PAD) with TV ring, prior COVID-19 infection 4/2020, recurrent syncope post LVAD s/p ILR, and chronic abdominal pain with prior negative workup who was admitted 6/14/21 with symptomatic anemia with Hgb 4.5 in setting of INR 8.8 without hemodynamic instability and has since had a protracted hospitalization. He was transfused and underwent VCE which showed active bleeding in the mid small bowel but subsequent enteroscopy 6/15 did not reveal any active bleeding. He acutely decompensated after procedure with fever/hypertension, low flow alarms, and pulmonary infiltrate with hypoxia requiring intubation from probable aspiration PNA. He was unable to extubated and has since undergone tracheostomy but tolerating persistent trach collar and nearing ability for decannulation. 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 Short term goal is maintenance of adequate nutrition and eventual trach decannulation.     Underwent mesenteric angiogram on 9/10 with vascular surgery. Found to have SMA stenosis at its origin but unable to place stent. Intra-op received 3uPRBC and required fem stop placement for L pseudoaneurysm. Post-op continued to have drop in H/H requiring additional 2u PRBC (last on 9/12), CT angio revealed left-sided retroperitoneal hematoma. Recently he developed worsening leukocytosis and low grade fevers, will be PAN Cx, starting broad spectrum abx, and ID will follow.

## 2021-09-14 NOTE — PROGRESS NOTE ADULT - SUBJECTIVE AND OBJECTIVE BOX
[facility] NUTRITION SUPPORT-- FOLLOW UP NOTE  --------------------------------------------------------------------------------    24 hour events/subjective: pt seen and examined. c/o left sided abd pain. + bm. denies n/v. tolerating tf at 45. low grade fever this am. wbc noted.    Diet:  Diet, NPO with Tube Feed:   Tube Feeding Modality: Nasogastric  Vital 1.5 Tank (VITAL1.5RTH)  Total Volume for 24 Hours (mL): 1560  Continuous  Starting Tube Feed Rate mL per Hour: 10  Increase Tube Feed Rate by (mL): 5     Every 24 hours  Until Goal Tube Feed Rate (mL per Hour): 65  Tube Feed Duration (in Hours): 24  Tube Feed Start Time: 11:45  Supplement Feeding Modality:  Nasogastric  Probiotic Yogurt/Smoothie Cans or Servings Per Day:  2       Frequency:  Daily (09-10-21 @ 21:18)      PAST HISTORY  --------------------------------------------------------------------------------  No significant changes to PMH, PSH, FHx, SHx, unless otherwise noted    ALLERGIES & MEDICATIONS  --------------------------------------------------------------------------------  Allergies    Amiodarone Hydrochloride (Other (Severe))    Intolerances      Standing Inpatient Medications  chlorhexidine 2% Cloths 1 Application(s) Topical <User Schedule>  dextrose 40% Gel 15 Gram(s) Oral once  dextrose 50% Injectable 25 Gram(s) IV Push once  dextrose 50% Injectable 12.5 Gram(s) IV Push once  glucagon  Injectable 1 milliGRAM(s) IntraMuscular once  insulin lispro (ADMELOG) corrective regimen sliding scale   SubCutaneous every 6 hours  insulin NPH human recombinant 4 Unit(s) SubCutaneous <User Schedule>  lidocaine   4% Patch 1 Patch Transdermal daily  methimazole 20 milliGRAM(s) Oral <User Schedule>  metoclopramide Injectable 10 milliGRAM(s) IV Push every 8 hours  mirtazapine 7.5 milliGRAM(s) Oral daily  multivitamin 1 Tablet(s) Oral daily  pantoprazole  Injectable 40 milliGRAM(s) IV Push every 12 hours  propranolol 40 milliGRAM(s) Oral every 8 hours  sertraline 100 milliGRAM(s) Oral daily  sodium chloride 0.9%. 1000 milliLiter(s) IV Continuous <Continuous>  thiamine 100 milliGRAM(s) Oral daily    PRN Inpatient Medications  ondansetron Injectable 4 milliGRAM(s) IV Push every 6 hours PRN  simethicone 80 milliGRAM(s) Chew every 8 hours PRN  sodium chloride 0.9% lock flush 10 milliLiter(s) IV Push every 1 hour PRN        REVIEW OF SYSTEMS  --------------------------------------------------------------------------------    General:  as per hpi  HEENT:  no headaches, no vision changes, no ear pain, no epistaxis, no throat pain   CV:  no chest pain, no palpitations  Resp:  no dyspnea, no cough  GI:  as per hpi  :  no bleeding, no dysuria  Muscle:  no pain, no weakness  Neuro:  no numbness, no tingling, no memory problems  Psych:  no insomnia, no mood problems, no depression  Endocrine:  no cold/heat intolerance  Skin:  no rash, no edema        VITALS/PHYSICAL EXAM  --------------------------------------------------------------------------------  T(C): 37.9 (21 @ 08:00), Max: 38.3 (21 @ 12:00)  HR: 92 (21 @ 09:06) (85 - 100)  BP: 102/67 (21 @ 12:15) (102/67 - 102/67)  RR: 34 (21 @ 09:06) (20 - 47)  SpO2: 100% (21 @ 09:06) (98% - 100%)  Wt(kg): --      21 @ 07:01  -  21 @ 07:00  --------------------------------------------------------  IN: 1765 mL / OUT: 1005 mL / NET: 760 mL    21 @ 07:01  -  21 @ 09:24  --------------------------------------------------------  IN: 55 mL / OUT: 0 mL / NET: 55 mL      I&O's Detail    13 Sep 2021 07:  -  14 Sep 2021 07:00  --------------------------------------------------------  IN:    Enteral Tube Flush: 320 mL    IV PiggyBack: 150 mL    Miscellaneous Tube Feedin mL    sodium chloride 0.9%: 240 mL  Total IN: 1765 mL    OUT:    Drain (mL): 200 mL    Indwelling Catheter - Urethral (mL): 205 mL    Voided (mL): 600 mL  Total OUT: 1005 mL    Total NET: 760 mL      14 Sep 2021 07:  -  14 Sep 2021 09:24  --------------------------------------------------------  IN:    Miscellaneous Tube Feedin mL    sodium chloride 0.9%: 10 mL  Total IN: 55 mL    OUT:  Total OUT: 0 mL    Total NET: 55 mL          Physical Exam:  Gen: lying in bed +ngt  HEENT: +trach  Chest: respirations non labored  Abd: softly dt ttp left sided abd/flank +perc c tube  LE: no edema  Neuro: awake alert appropriate      LABS/STUDIES  --------------------------------------------------------------------------------              9.7    21.31 >-----------<  143      [21 @ 01:25]              30.0     133  |  99  |  11  ----------------------------<  160      [21 @ 00:33]  4.1   |  23  |  0.42        Ca     8.5     [21 @ 00:33]      Mg     1.6     [21 00:33]      Phos  2.6     [21 00:33]    TPro  6.0  /  Alb  2.8  /  TBili  1.2  /  DBili  x   /  AST  36  /  ALT  60  /  AlkPhos  215  [21 @ 00:33]              [21 @ 01:03]    Ca ionizedBlood Gas Arterial - Calcium, Ionized: 1.17 mmol/L (21 @ 00:07)  Blood Gas Arterial - Calcium, Ionized: 1.26 mmol/L (21 @ 13:43)    Creatinine Trend:  POC glucoseGlucose, Serum: 160 mg/dL (21 00:33)  CAPILLARY BLOOD GLUCOSE      POCT Blood Glucose.: 134 mg/dL (14 Sep 2021 05:36)  POCT Blood Glucose.: 179 mg/dL (14 Sep 2021 00:01)  POCT Blood Glucose.: 124 mg/dL (13 Sep 2021 17:41)  POCT Blood Glucose.: 147 mg/dL (13 Sep 2021 11:54)  POCT Blood Glucose.: 56 mg/dL (13 Sep 2021 11:51)    PrealbuminPrealbumin, Serum: 11 mg/dL (21 @ 04:01)  Prealbumin, Serum: 10 mg/dL (08-15-21 @ 13:53)    Triglycerides

## 2021-09-15 NOTE — PROGRESS NOTE ADULT - SUBJECTIVE AND OBJECTIVE BOX
INFECTIOUS DISEASES FOLLOW UP-- Anitra Prater  [phone]    This is a follow up note for this  66yMale with  Anemia    Acute cholecystitis  prolonged hospitalization  c/o pain left lateral flank region        ROS:  CONSTITUTIONAL: frail, cachectic    Allergies    Amiodarone Hydrochloride (Other (Severe))    Intolerances        ANTIBIOTICS/RELEVANT:  antimicrobials  cefepime   IVPB 1000 milliGRAM(s) IV Intermittent every 8 hours  cefepime   IVPB      vancomycin    Solution 125 milliGRAM(s) Oral every 6 hours  vancomycin  IVPB      vancomycin  IVPB 500 milliGRAM(s) IV Intermittent every 12 hours    immunologic:    OTHER:  acetaminophen   Tablet .. 650 milliGRAM(s) Oral every 6 hours PRN  chlorhexidine 2% Cloths 1 Application(s) Topical <User Schedule>  dextrose 40% Gel 15 Gram(s) Oral once  dextrose 50% Injectable 25 Gram(s) IV Push once  dextrose 50% Injectable 12.5 Gram(s) IV Push once  glucagon  Injectable 1 milliGRAM(s) IntraMuscular once  insulin lispro (ADMELOG) corrective regimen sliding scale   SubCutaneous every 6 hours  lidocaine   4% Patch 1 Patch Transdermal daily  methimazole 15 milliGRAM(s) Oral <User Schedule>  metoclopramide Injectable 10 milliGRAM(s) IV Push every 8 hours  mirtazapine 7.5 milliGRAM(s) Oral daily  multivitamin 1 Tablet(s) Oral daily  ondansetron Injectable 4 milliGRAM(s) IV Push every 6 hours PRN  pantoprazole  Injectable 40 milliGRAM(s) IV Push every 12 hours  propranolol 40 milliGRAM(s) Oral every 8 hours  sertraline 100 milliGRAM(s) Oral daily  simethicone 80 milliGRAM(s) Chew every 8 hours PRN  sodium chloride 0.9% lock flush 10 milliLiter(s) IV Push every 1 hour PRN  sodium chloride 0.9%. 1000 milliLiter(s) IV Continuous <Continuous>  thiamine 100 milliGRAM(s) Oral daily      Objective:  Vital Signs Last 24 Hrs  T(C): 37.5 (15 Sep 2021 16:00), Max: 38.7 (15 Sep 2021 04:00)  T(F): 99.5 (15 Sep 2021 16:00), Max: 101.7 (15 Sep 2021 04:00)  HR: 88 (15 Sep 2021 17:00) (82 - 103)  BP: --  BP(mean): --  RR: 19 (15 Sep 2021 17:00) (18 - 49)  SpO2: 100% (15 Sep 2021 17:00) (95% - 100%)    PHYSICAL EXAM:  Constitutional:no acute distress, febrile  Eyes:ALONSO, EOMI  Ear/Nose/Throat: no oral lesions, trach with speaking valve in place  rij CVC	  Respiratory: audible bilateral  Cardiovascular: VAD sounds  Gastrointestinal: cholecystotomy tube with sanguinous material  Extremities:no e/e/c  groin CVC removed  left flank tenderness and fullness appreciated  No Lymphadenopathy  IV sites not inflammed.    LABS:                        9.9    22.98 )-----------( 168      ( 15 Sep 2021 00:36 )             31.0     09-15    132<L>  |  96  |  11  ----------------------------<  137<H>  4.1   |  25  |  0.45<L>    Ca    9.6      15 Sep 2021 00:36  Phos  3.5     09-15  Mg     1.8     09-15    TPro  7.0  /  Alb  3.0<L>  /  TBili  2.4<H>  /  DBili  x   /  AST  23  /  ALT  52<H>  /  AlkPhos  258<H>  09-15      Urinalysis Basic - ( 14 Sep 2021 13:47 )    Color: Dark Yellow / Appearance: Slightly Turbid / S.025 / pH: x  Gluc: x / Ketone: Negative  / Bili: Negative / Urobili: Negative   Blood: x / Protein: 30 mg/dL / Nitrite: Negative   Leuk Esterase: Negative / RBC: 3 /hpf / WBC 1 /HPF   Sq Epi: x / Non Sq Epi: 1 /hpf / Bacteria: Negative        MICROBIOLOGY:      Vancomycin Level, Trough: 7.0: Vancomycin trough levels should be rapidly reached and maintained at  15-20 ug/ml for life threatening MRSA  infections such as sepsis, endocarditis, osteomyelitis and pneumonia. A  first trough level should be drawn  before the 3rd or 4th dose.  Risk of renal toxicity is increased for levels >15 ug/ml, in patients on  other nephrotoxic drugs, who are  hemodynamically unstable, have unstable renal function, or are on  Vancomycin therapy for >14 days. Renal function with  creatinine levels should be monitored for those patients. ug/mL (21 @ 17:05)          RECENT CULTURES:   @ 16:27  .Sputum Sputum  --  --  --  --  --   @ 15:13  .Blood Blood  --  --  --    No growth to date.  --   @ 18:59  .Blood Blood  --  --  --    No growth to date.  --      RADIOLOGY & ADDITIONAL STUDIES:    < from: Xray Chest 1 View- PORTABLE-Urgent (Xray Chest 1 View- PORTABLE-Urgent .) (09.15.21 @ 10:07) >  IMPRESSION:    The heart is slightly enlarged. Left lower lobe pneumonia and/or atelectasis. Small amount of fluid in the right fissure cannot be ruled out entirely. NG tube was advanced and the tip is in the pylorus. All other life supporting devices are in good position and unchanged when compared to previous study done on September 15, 2021 at 2:26 AM.    < end of copied text >

## 2021-09-15 NOTE — PROGRESS NOTE ADULT - SUBJECTIVE AND OBJECTIVE BOX
RICKY JOINT  MRN#: [id number]  Subjective:  Pulmonary progress  : recurrent Acute hypoxic respiratory Failure ,aspiration pneumonia, NICM  , chart reviewed and H/O obtained radiological and Laboratory study reviewed patient Examined     65M PMH ACC/AHA stage D HF due to NICM HM2 LVAD , TV annuloplasty ring 17 as destination therapy due to severe peripheral artery disease with significant stenosis  SIADH, Depression, CKD-3 with hyperkalemia, past E. coli UTIs, driveline drainage (21) and COVID-19 (back in 2020)  He was recently seen in clinic where he complained of abdominal pain and dark stools w constipation back in May. He presents to [facility] ER today weakness and fatigue, moderate and + Black stools for three days, on coumadin secondary to warfarin use in the setting of an LVAD. Patient has required transfusions for GIB in the past. Mostly recently back in 2021 pt had anemia with dark stools. No interventions was done at that time. However Last Endoscopy was done in 2020 (negative). Today labs show patient is anemic with H/H of 4.5/16.3,. INR is 8.84 MAP in the 90s, Temp 35.1. He denies any chest pain, shortness of breath, dizziness, abd pain, nausea or vomiting. found to have  rectal bleeding underwent endoscopy ,old blood in the proximal ileum ,  develop sepsis with LL opacity given Antibiotics , Extubated , reintubated , Bronchoscopy on Zosyn for LL pneumonia  and Amiodarone S/P TV Annuloplasty , patient remain intubated on full ventilatory support .S/P multiple units of blood transfusion , remain on full ventilatory support on Precedex and propofol , new central IJ line , diarrhea C diff. +ve on po vancomycin and IV Flagyl,  mildly distended belly , fever start on cefepime 2gm q 8 hrs S/P tracheostomy .  new RT Subclavian central line continue on contact  isolation ,still diarrhea on C-diff antibiotics ENT follow up appreciated , trial of C-PAP as tolerated , , copious secretion from trach. site chest x ray left lower lobe pneumonia , tolerating trch. collar 50% FI02 still excessive secretion need pulmonary toilet , off Ancef antibiotic , no more diarrhea back on full support mechanical ventilator , chest x ray show improvement in LLL air space disease, more awake and responsive on tube feeding no more diarrhea , S/P  Ancef for bacteremia no fever ,ID follow up noted ,  no nausea or vomiting or diarrhea still very weak and tired , event note pulled NG tube now replaced , back on tube feeding ,still on po vancomycin , getting PT and OT at bed side , no plan for decannulation for now. , no more diarrhea receiving PT and OPT at bed side , minimal secretion from tracheostomy site , no SOB getting stronger , improve muscle tone patient transfer to monitor bed still on contact isolation for C-Difficel colitis on 50% FI02, NG tube clogged , and change to resume tube feeding still loose stool . H/H drop significantly require blood transfusion , most likely GI bleeding , IV heparin D/C , vomiting 200 cc of creamy color tube feeding on hold no sob, still melena monitor in the CTU possible capsule endoscopy , H/H is stable ., patient develop TR sided  pneumothorax require chest tube placement , RT IJ central line  placed , develop fever shaking chills , blood culture positive for serratia marcescens , start on cefepime .the patient  become hypoxic and hypotensive placed on full ventilatory support and Vasopressin , levophed and Dobutamine ,S/P blood transfusion on meropenem and vancomycin ,   , on and  off pressors , occasional agitation on Precedex .S/P IR cholecystostomy tube drainage placement in the RT upper Quadrant , resume anticoagulation chest x ray noted C-PAP trail lasted only for 2 hrs , new RT SC line and D/C RT IJ line , RT pig tail cathter has been removed , tolerating C-PAP trial placed on trach. collar 50% FI02 GI consultant noted on NG tube feeding as tolerated , develop AF RVR S/P  bolus Amiodarone  back to regular sinus Rhythm , Flat Affect depressed , back on tube feeding Vital AF at 60 cc/ hr .still intermittent abdominal pain , no fever saturation is accepted  back on full ventilatory support , tired and sleepy on round  .start  back on  tube feeding . tolerating it very well , no nausea or vomiting , good 02 saturation on trac-collar on methimazole for hyperthyroidism , no new C/O no plan for decannulation yet , electrolyte blood test is still pending .on respiratory isolation sputum culture is negative , increase WBC  improved on cefepime , sputum positive for enterococcus , condition is the same ,still C/O Abdominal pain , white count is improving , no chest pain or SOB ,  .placed on Reglan 10 mg TID for gastric motility , depressed , withdrawn. on full NG tube diet with Vital , secretion are much improved , went for mesenteric angiogram , unable to stent SMA S/P 3 units of blood transfusion on trach. collar 40% Fi02, anxious to eat discuss possible decannulation,  H/H  are stable vascular  note appreciated no mediate plan for repeat mesenteric angiogram remain on trach.-collar at 40% FI02, RT IJ in place reassessed for stent in the SMA by vascular       (2021 16:57)    PAST MEDICAL & SURGICAL HISTORY:  CHF (congestive heart failure)    CAD (coronary artery disease)  Depression    Pleural effusion    History of 2019 novel coronavirus disease (COVID-19)  2020    Hemorrhoids    Bleeding hemorrhoids    Peripheral arterial disease    Claudication    BPH with urinary obstruction    ACC/AHA stage D systolic heart failure  Anticoagulation goal of INR 2.0 to 2.5    Falls    Clavicle fracture    CKD (chronic kidney disease), stage III    Iron deficiency anemia    H/O epistaxis    Vertigo    GI bleed    S/P TVR (tricuspid valve repair)    S/P ventricular assist device    S/P endoscopy    OBJECTIVE:  ICU Vital Signs Last 24 Hrs  T(C): 38.2 (2021 10:00), Max: 38.5 (2021 12:00)  T(F): 100.8 (2021 10:00), Max: 101.3 (2021 12:00)  HR: 65 (2021 10:00) (61 - 69)  BP: --  BP(mean): --  ABP: 105/67 (2021 10:00) (90/54 - 113/64)  ABP(mean): 77 (2021 10:00) (63 - 77)  RR: 20 (2021 10:00) (19 - 35)  SpO2: 99% (2021 10:00) (96% - 100%)       @ 07:01  -   @ 07:00  --------------------------------------------------------  IN: 2693.9 mL / OUT: 1415 mL / NET: 1278.9 mL     @ 07: @ 10:49  --------------------------------------------------------  IN: 420.8 mL / OUT: 115 mL / NET: 305.8 mL  PHYSICAL EXAM:Daily   Elderly male S/P tracheostomy on full ventilatory support  50% FI02 ,  Daily Weight in k.4 (2021 00:00)  HEENT:     + NCAT  + EOMI  - Conjuctival edema   - Icterus   - Thrush   - ETT  + NGT/OGT  Neck:         + FROM  RT IJ  line JVD  - Nodes - Masses + Mid-line trachea + Tracheostomy  Chest:            normal A-P diameter    Lungs:          + CTA   + Rhonchi    - Rales    - Wheezing + Decreased  LT BS   - Dullness R L  Cardiac:       + S1 + S2    + RRR   - Irregular   - S3  - S4    - Murmurs   - Rub   - Hamman’s sign   Abdomen:    + BS  + Soft + Non-tender - Distended - Organomegaly - PEG .cholecystostomy tube in place  Extremities:   - Cyanosis U/L   - Clubbing  U/L  + LE/UE Edema   + Capillary refill    + Pulses   Neuro:        - Awake   -  Alert   - Confused   - Lethargic   + Sedated  + Generalized Weakness  Skin:        - Rashes    - Erythema   + Normal incisions   + IV sites intact          HOSPITAL MEDICATIONS: All medications reviewed and analyzed  MEDICATIONS  (STANDING):  amiodarone    Tablet 200 milliGRAM(s) Oral daily  chlorhexidine 0.12% Liquid 15 milliLiter(s) Oral Mucosa every 12 hours  chlorhexidine 2% Cloths 1 Application(s) Topical <User Schedule>  dexmedetomidine Infusion 0.5 MICROgram(s)/kG/Hr (9.81 mL/Hr) IV Continuous <Continuous>  dextrose 50% Injectable 50 milliLiter(s) IV Push every 15 minutes  heparin  Infusion 400 Unit(s)/Hr (12.5 mL/Hr) IV Continuous <Continuous>  Hydromorphone  Injectable 0.5 milliGRAM(s) IV Push once  insulin lispro (ADMELOG) corrective regimen sliding scale   SubCutaneous every 6 hours  pantoprazole  Injectable 40 milliGRAM(s) IV Push every 12 hours  piperacillin/tazobactam IVPB.. 3.375 Gram(s) IV Intermittent every 8 hours  propofol Infusion 20 MICROgram(s)/kG/Min (9.42 mL/Hr) IV Continuous <Continuous>  sodium chloride 0.9% lock flush 3 milliLiter(s) IV Push every 8 hours  sodium chloride 0.9%. 1000 milliLiter(s) (10 mL/Hr) IV Continuous <Continuous>    MEDICATIONS  (PRN):  acetaminophen    Suspension .. 650 milliGRAM(s) Oral every 6 hours PRN Temp greater or equal to 38C (100.4F)    LABS: All Lab data reviewed and analyzed                        9.9    22.98 )-----------( 168      ( 15 Sep 2021 00:36 )             31.0    09-15    132<L>  |  96  |  11  ----------------------------<  137<H>  4.1   |  25  |  0.45<L>    Ca    9.6      15 Sep 2021 00:36  Phos  3.5     09-15  Mg     1.8     09-15    TPro  7.0  /  Alb  3.0<L>  /  TBili  2.4<H>  /  DBili  x   /  AST  23  /  ALT  52<H>  /  AlkPhos  258<H>  09-15      Ca    8.5      14 Sep 2021 00:33  Phos  2.6       Mg     1.6         TPro  6.0  /  Alb  2.8<L>  /  TBili  1.2  /  DBili  x   /  AST  36  /  ALT  60<H>  /  AlkPhos  215<H>        Ca    9.2      13 Sep 2021 01:03  Phos  3.0     -  Mg     1.7         TPro  6.5  /  Alb  2.8<L>  /  TBili  0.6  /  DBili  x   /  AST  33  /  ALT  75<H>  /  AlkPhos  201<H>                                                                                                                                            PTT - ( 2021 04:52 )  PTT:45.2 sec LIVER FUNCTIONS - ( 2021 00:42 )  Alb: 3.4 g/dL / Pro: 6.7 g/dL / ALK PHOS: 213 U/L / ALT: 15 U/L / AST: 24 U/L / GGT: x           RADIOLOGY: - Reviewed and analyzed RT Pig tail cathter  , LVAD HM2, CT scan of abdomen reviewed result noted

## 2021-09-15 NOTE — PROGRESS NOTE ADULT - PROBLEM SELECTOR PLAN 4
- serratia bacteremia and poss acalculous cholecystitis. B/c with gram + cocci and many enterobacter Carbapenem resistant strain and now s/p per dawn drain  - also with enterobacter from sputum culture 8/13  - continues to have worsening leukocytosis (currently 22.98) and is noted febrile (tmax 101.7).   - ID following and appreciate recommendations   - currently on cefepime and Vacno IV  - Was PAN Cx 9/13 and 9/15, all cultures remain NGTD  - would recommend sending culture from [facility] drain  - would recommend sending C.diff sample as patient is having loose BM

## 2021-09-15 NOTE — PROGRESS NOTE ADULT - ASSESSMENT
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 coli UTIs, driveline drainage (1/7/21) and COVID-19, here initially for GIB prolonged hospital course, s/p tracheostomy, acalculous cholecystitis s/p perc dawn. Patient has persistent intermittent abdominal pain, CTA showing possible proximal SMA stenosis. CTA poor quality limited by significant streak artifact. Mesenteric duplex velocities without evidence of significant stenosis, however study unreliable in the setting of patient's augmented systolic function given LVAD. Now s/p diagnostic mesenteric angiogram with unsuccessful SMA stenting. 09/12 CTA obtained due to downtrending H/H with evidence of L RP hematoma without evidence of active extravasation.     PLAN:  - Appreciate ID recs, f/u treatment for suspected PNA  - Monitor L groin for signs of PSA or hematoma (No signs at the moment)  - Monitor LLE neurovascular exam  - Trend H/H  - Continue excellent care per CTICU    Vascular Surgery  [id number]

## 2021-09-15 NOTE — PROGRESS NOTE ADULT - PROBLEM SELECTOR PLAN 7
- endocrine recs appreciated  - currently on methimazole, propranolol, and steroids  - Steroid taper completed  - Continue to monitor TFT post angiogram

## 2021-09-15 NOTE — CHART NOTE - NSCHARTNOTEFT_GEN_A_CORE
Pt seen at bedside for speaking valve trial and repeat swallow evaluation. Pt awake and alert, following directions, generally calm and cooperative for session. Pt with Portex 6 trach, cuff deflated, on FiO2 40% via TC. Pt with baseline VSS notable for RR 30s, O2 sat 97, HR 90-91s. PMV was placed on hub of trach, with VSS notable for O2 sat 96 HR 90-91s. RR 20s-30s.  RN reports that Pt has not required endotracheal suctioning today. Pt also declined trials of ice chips. Purposeful proactive rounding reinforced and 5 Ps addressed. Pt left in no distress.     Pt with gradually improving tolerance of PMV trials; gradually improving secretion management.  Will continue to follow for dysphagia therapy and PMV trials. Pt would benefit from additional time and therapy prior to repeat assessment.  Findings d/w CLAUDIA Stiles RN [place] and Pt.    RECOMMENDATIONS  1) Continue NPO, with non-oral nutrition/hydration/medications.   2) Strict aspiration and reflux precautions for secretions and enteral feeds.  3) Maintain good oral hygiene.   4) Restorative swallow therapy, and PMV trials with SLP only. Will continue to follow while patient is in-house, as schedule permits.   5) d/c tbd pending hospital course.    Gege Hernandez MA, CCC-SLP  Speech-Language Pathologist  pager #[phone] 65M PMH ACC/AHA stage D HF due to NICM HM2 LVAD, recurrent syncope post LVAD, s/p ILR, TV annuloplasty ring 9/12/17 as destination therapy due to severe peripheral artery disease with significant stenosis  SIADH, Depression, CKD-3 with hyperkalemia, past E. coli UTIs, driveline drainage (1/7/21) and COVID-19 (April 2020). Seen in clinic for c/o abdominal pain and dark stools w constipation in May. He presents to [facility] ER 6/14 w/ weakness, fatigue, and + Black stools x3 days, warfarin use in setting of  LVAD. Pt has required transfusions for GIB in past. Most recently in jan 2021 pt had anemia with dark stools. No interventions at that time. Last Endoscopy done in July 2020 (negative).  Sx/Hosp Course:   6/14 admit for melena w/ anemia, INR 8.84   6/15 Capsule study (+) for small bowel bleed, balloon endoscopy (old blood in prox ileum); post EGD - septic w/ L opacity, re-intubated for concern for aspiration, TTE (Mod MR, decrease biV w/ interventricular septum boweing towards R)  6/20 +C Diff   6/22 CT C/A/P: Fluid filled colon which may be 2/2 rapid transit. Small b/l pl effs with associated compressive atelectasis New ISABELLE & LLL  parenchymal opacities, suspicious for PNA. Mod stenosis in proximal superior mesenteric artery.   6/25: s/p #8 Shiley trach at bedside  7/20 TC since 7/7, continue as tolerated. Pt transferred to SDU.   7/25 INR 2.69  H&H 7.7/25.1 refusing Tube feeds on D5 @50 cc/hr. This am + BM Melena - PRBC x1  GI team consulted - NPO, Pt to return to CTU for further management; 1PRBCS  7/27 Post op INR 2.2 today. No bleeding. BC + for SM. Pt hypotensive requiring pressor and inotropic support.   7/26 R PCT for PTX   7/28 CT C/A/P: sub q emphysema in R chest wall, GGO RUL, small ascites CTH negative; Abd US: GB thickening, pericholecystic fluid    7/31 Perchole drain in place continues to drain total output overnight 133.  Fever today 38.8 given tylenol.  Will repeat BC now.    8/1 duplex LE negative   8/7 Persistent abd pain, refusing tube feeds and meds, Psych consulted  8/8 tolerating TC ATC  Course has been also c/b VAP, serratia bacteremia with acalculous cholecystitis s/p percutaneous tube, thyrotoxicosis with hyperthyroidism likely related to amiodarone, and persistent abdominal pain with unclear source other than possible mesenteric ischemia from possible SMA stenosis that has prevented adequate enteral nutrition. Short term goal is maintenance of adequate nutrition and eventual trach decannulation. Underwent mesenteric angiogram on 9/10 with vascular surgery. Found to have SMA stenosis at its origin but unable to place stent. Intra-op received 3uPRBC and required fem stop placement for L pseudoaneurysm. Post-op continued to have drop in H/H requiring additional 2u PRBC (last on 9/12), CT angio revealed left-sided retroperitoneal hematoma. He continues to have worsening leukocytosis and low grade fevers (Tmax 101.7). All cultures have remained NGTD, all lines were exchanged and patient was remains on broad spectrum abx.     Pt seen at bedside for speaking valve trial and repeat swallow evaluation. Pt awake and alert, following directions, generally calm and cooperative for session. Pt with Portex 6 trach, cuff deflated, on FiO2 40% via TC. Pt with baseline VSS notable for RR 30s, O2 sat 97, HR 90-91s. PMV was placed on hub of trach, with VSS notable for O2 sat 96 HR 90-91s. RR 20s-30s.  RN reports that Pt has not required endotracheal suctioning today.   Pt was administered PO trials of ice chips. Pt with  Purposeful proactive rounding reinforced and 5 Ps addressed. Pt left in no distress.     Pt with continued tolerance of PMV trials; gradually improving secretion management.  Will continue to follow for dysphagia therapy and PMV trials. Pt would benefit from additional time and therapy prior to repeat assessment.  Findings d/w CLAUDIA Stiles RN [place] and Pt.    RECOMMENDATIONS:    Placement of Passy-Michell Speaking Valve for 15-20 minute intervals, as tolerated, during waking hours. Only with supervision, when staff present, or when visitors present.  Guidelines for valve usage as follows:  1) Cuff fully deflated   2) Do not place valve if patient with baseline RD and/or thick/copious secretions  3) Remove valve if: SpO2 <92%, HR/RR 1.5 x norm, pt with increased work of breathing , patient with subjective complaints, evidence of airtrapping  4) Remove while asleep and for aerosolized respiratory treatments     1) Continue NPO, with non-oral nutrition/hydration/medications.   2) Strict aspiration and reflux precautions for secretions and enteral feeds.  3) Maintain good oral hygiene.   4) d/c tbd pending hospital course.    Gege Hernandez MA, CCC-SLP  Speech-Language Pathologist  pager #[phone]

## 2021-09-15 NOTE — PROGRESS NOTE ADULT - SUBJECTIVE AND OBJECTIVE BOX
SURGERY PROGRESS NOTE    SUBJECTIVE / 24H EVENTS:  Patient seen and examined on morning rounds. No acute events overnight.      OBJECTIVE:  VITAL SIGNS:  T(C): 37.5 (09-15-21 @ 16:00), Max: 38.7 (09-15-21 @ 04:00)  HR: 89 (09-15-21 @ 16:00) (82 - 103)  BP: 104/68 (21 @ 18:00) (104/68 - 104/68)  RR: 19 (09-15-21 @ 16:00) (18 - 49)  SpO2: 98% (09-15-21 @ 16:00) (95% - 100%)  Daily     Daily Weight in k.7 (15 Sep 2021 00:00)  POCT Blood Glucose.: 145 mg/dL (09-15-21 @ 12:44)  POCT Blood Glucose.: 142 mg/dL (09-15-21 @ 05:08)  POCT Blood Glucose.: 141 mg/dL (21 @ 23:57)      PHYSICAL EXAM:  Gen: NAD  LS: Respirations unlabored on RA  GI: Soft. Nondistended. Mild tenderness to palpation in bilateral lower quadrants, improved tenderness on left flank  Groin: L groin dressing c/d/i. Mildly tender to palpation but no swelling, ecchymosis or palpable mass. Palpable femoral pulse  Ext: Warm, well perfused.      21 @ 07:01  -  09-15-21 @ 07:00  --------------------------------------------------------  IN:    Enteral Tube Flush: 300 mL    IV PiggyBack: 500 mL    Miscellaneous Tube Feedin mL    sodium chloride 0.9%: 240 mL  Total IN: 2120 mL    OUT:    Drain (mL): 240 mL    Voided (mL): 1125 mL  Total OUT: 1365 mL    Total NET: 755 mL      09-15-21 @ 07:01  -  09-15-21 @ 16:02  --------------------------------------------------------  IN:    Enteral Tube Flush: 25 mL    IV PiggyBack: 50 mL    Miscellaneous Tube Feedin mL    sodium chloride 0.9%: 90 mL  Total IN: 635 mL    OUT:    Voided (mL): 400 mL  Total OUT: 400 mL    Total NET: 235 mL          LAB VALUES:  09-15    132<L>  |  96  |  11  ----------------------------<  137<H>  4.1   |  25  |  0.45<L>    Ca    9.6      15 Sep 2021 00:36  Phos  3.5     09-15  Mg     1.8     09-15    TPro  7.0  /  Alb  3.0<L>  /  TBili  2.4<H>  /  DBili  x   /  AST  23  /  ALT  52<H>  /  AlkPhos  258<H>  09-15                               9.9    22.98 )-----------( 168      ( 15 Sep 2021 00:36 )             31.0     LIVER FUNCTIONS - ( 15 Sep 2021 00:36 )  Alb: 3.0 g/dL / Pro: 7.0 g/dL / ALK PHOS: 258 U/L / ALT: 52 U/L / AST: 23 U/L / GGT: x             ABG - ( 15 Sep 2021 00:02 )  pH, Arterial: 7.42  pH, Blood: x     /  pCO2: 47    /  pO2: 109   / HCO3: 30    / Base Excess: 5.3   /  SaO2: 99.5                  Urinalysis Basic - ( 14 Sep 2021 13:47 )    Color: Dark Yellow / Appearance: Slightly Turbid / S.025 / pH: x  Gluc: x / Ketone: Negative  / Bili: Negative / Urobili: Negative   Blood: x / Protein: 30 mg/dL / Nitrite: Negative   Leuk Esterase: Negative / RBC: 3 /hpf / WBC 1 /HPF   Sq Epi: x / Non Sq Epi: 1 /hpf / Bacteria: Negative        MICROBIOLOGY:    Culture - Sputum (collected 14 Sep 2021 16:27)  Source: .Sputum Sputum  Gram Stain (14 Sep 2021 23:29):    Moderate polymorphonuclear leukocytes per low power field    No Squamous epithelial cells per low power field    Few Gram Negative Rods per oil power field    Culture - Blood (collected 14 Sep 2021 15:13)  Source: .Blood Blood  Preliminary Report (15 Sep 2021 16:01):    No growth to date.    Culture - Blood (collected 13 Sep 2021 18:59)  Source: .Blood Blood  Preliminary Report (14 Sep 2021 19:02):    No growth to date.        RADIOLOGY:  PACS Image: Image(s) Available (09-15-21 @ 10:07)  PACS Image: Image(s) Available (09-15-21 @ 02:27)  PACS Image: Image(s) Available (21 @ 21:58)        MEDICATIONS  (STANDING):  cefepime   IVPB 1000 milliGRAM(s) IV Intermittent every 8 hours  cefepime   IVPB      chlorhexidine 2% Cloths 1 Application(s) Topical <User Schedule>  dextrose 40% Gel 15 Gram(s) Oral once  dextrose 50% Injectable 25 Gram(s) IV Push once  dextrose 50% Injectable 12.5 Gram(s) IV Push once  glucagon  Injectable 1 milliGRAM(s) IntraMuscular once  hydrocortisone sodium succinate Injectable 25 milliGRAM(s) IV Push <User Schedule>  insulin lispro (ADMELOG) corrective regimen sliding scale   SubCutaneous every 6 hours  lidocaine   4% Patch 1 Patch Transdermal daily  methimazole 15 milliGRAM(s) Oral <User Schedule>  metoclopramide Injectable 10 milliGRAM(s) IV Push every 8 hours  mirtazapine 7.5 milliGRAM(s) Oral daily  multivitamin 1 Tablet(s) Oral daily  pantoprazole  Injectable 40 milliGRAM(s) IV Push every 12 hours  propranolol 40 milliGRAM(s) Oral every 8 hours  sertraline 100 milliGRAM(s) Oral daily  sodium chloride 0.9%. 1000 milliLiter(s) (10 mL/Hr) IV Continuous <Continuous>  thiamine 100 milliGRAM(s) Oral daily  vancomycin    Solution 125 milliGRAM(s) Oral every 6 hours  vancomycin  IVPB      vancomycin  IVPB 500 milliGRAM(s) IV Intermittent every 12 hours    MEDICATIONS  (PRN):  acetaminophen   Tablet .. 650 milliGRAM(s) Oral every 6 hours PRN Mild Pain (1 - 3)  ondansetron Injectable 4 milliGRAM(s) IV Push every 6 hours PRN Nausea and/or Vomiting  simethicone 80 milliGRAM(s) Chew every 8 hours PRN Gas  sodium chloride 0.9% lock flush 10 milliLiter(s) IV Push every 1 hour PRN Pre/post blood products, medications, blood draw, and to maintain line patency

## 2021-09-15 NOTE — PROGRESS NOTE ADULT - ASSESSMENT
Assessment and Recommendation:   · Assessment	  Assessment and recommendation :  Recurrent Acute hypoxic respiratory Failure S/P tracheostomy on full ventilatory support  50% FI02,   Acute blood loss anemia S/P multiple  blood transfusion   going for mesenteric angiogram  S/P septic shock off vasopressin , levophed and off  Dobutamine   S/P cholecystostomy tube placement by IR    AF RVR back to regular sinus Rhythm   Non ischemic cardiomyopathy continue ACE inhibitor and B-Blockers   mesenteric ischemia failure to stent SMA   S/P 3 unit of blood transfusion   S/P Septic shock and cardiogenic shock   Stage D systolic heart failure S/P LVAD HM2   MH2 LVAD  with  TV Annuloplasty  Severe peripheral vascular disease   severe hyperglycemia on insulin coverage    Reglan 10 mg for Gastric Motility   hyperthyroidism on Methimazole 10mg TID   critical care polyneuropathy   Anemia of Acute blood Loss   severe protein caloric malnutrition   S/P blood and FFP transfusion   Chronic kidney disease stage III  Depressed and withdrawn   on   NGT feeding on Vital  advanced to 55 cc/ hr   ? decannulation   GI prophylaxis with PPI   Discussed with TCV team and vascular

## 2021-09-15 NOTE — PROGRESS NOTE ADULT - SUBJECTIVE AND OBJECTIVE BOX
[facility] NUTRITION SUPPORT-- FOLLOW UP NOTE  --------------------------------------------------------------------------------    24 hour events/subjective: pt seen and examined. febrile overnight, lines changed, cxs sent. at time of eval tolerating feeds at 50cc/hr, per rn overnight w episode of ?emesis, small amount, appeared to be secretions mixed w tf, feeds intermittently held and then resumed. pt c/o n/v/abd pain. +bms. slp eval noted.    Diet:  Diet, NPO with Tube Feed:   Tube Feeding Modality: Nasogastric  Vital 1.5 Tank (VITAL1.5RTH)  Total Volume for 24 Hours (mL): 1560  Continuous  Starting Tube Feed Rate mL per Hour: 10  Increase Tube Feed Rate by (mL): 5     Every 24 hours  Until Goal Tube Feed Rate (mL per Hour): 65  Tube Feed Duration (in Hours): 24  Tube Feed Start Time: 11:45  Supplement Feeding Modality:  Nasogastric  Probiotic Yogurt/Smoothie Cans or Servings Per Day:  2       Frequency:  Daily (09-10-21 @ 21:18)      PAST HISTORY  --------------------------------------------------------------------------------  No significant changes to PMH, PSH, FHx, SHx, unless otherwise noted    ALLERGIES & MEDICATIONS  --------------------------------------------------------------------------------  Allergies    Amiodarone Hydrochloride (Other (Severe))    Intolerances      Standing Inpatient Medications  cefepime   IVPB 1000 milliGRAM(s) IV Intermittent every 8 hours  cefepime   IVPB      chlorhexidine 2% Cloths 1 Application(s) Topical <User Schedule>  dextrose 40% Gel 15 Gram(s) Oral once  dextrose 50% Injectable 25 Gram(s) IV Push once  dextrose 50% Injectable 12.5 Gram(s) IV Push once  glucagon  Injectable 1 milliGRAM(s) IntraMuscular once  hydrocortisone sodium succinate Injectable 25 milliGRAM(s) IV Push <User Schedule>  insulin lispro (ADMELOG) corrective regimen sliding scale   SubCutaneous every 6 hours  lidocaine   4% Patch 1 Patch Transdermal daily  methimazole 15 milliGRAM(s) Oral <User Schedule>  metoclopramide Injectable 10 milliGRAM(s) IV Push every 8 hours  mirtazapine 7.5 milliGRAM(s) Oral daily  multivitamin 1 Tablet(s) Oral daily  pantoprazole  Injectable 40 milliGRAM(s) IV Push every 12 hours  propranolol 40 milliGRAM(s) Oral every 8 hours  sertraline 100 milliGRAM(s) Oral daily  sodium chloride 0.9%. 1000 milliLiter(s) IV Continuous <Continuous>  thiamine 100 milliGRAM(s) Oral daily  vancomycin    Solution 125 milliGRAM(s) Oral every 6 hours  vancomycin  IVPB      vancomycin  IVPB 500 milliGRAM(s) IV Intermittent every 12 hours    PRN Inpatient Medications  ondansetron Injectable 4 milliGRAM(s) IV Push every 6 hours PRN  simethicone 80 milliGRAM(s) Chew every 8 hours PRN  sodium chloride 0.9% lock flush 10 milliLiter(s) IV Push every 1 hour PRN        REVIEW OF SYSTEMS  --------------------------------------------------------------------------------  General:  as per hpi  HEENT:  no headaches, no vision changes, no ear pain, no epistaxis, no throat pain   CV:  no chest pain, no palpitations  Resp:  no dyspnea  GI:  as per hpi  :  no bleeding, no dysuria  Muscle:  no pain, no weakness  Neuro:  no numbness, no tingling, no memory problems  Psych:  no insomnia, no mood problems, no depression  Endocrine:  no cold/heat intolerance  Skin:  no rash, no edema        VITALS/PHYSICAL EXAM  --------------------------------------------------------------------------------  T(C): 36.4 (09-15-21 @ 08:00), Max: 38.7 (09-15-21 @ 04:00)  HR: 86 (09-15-21 @ 09:00) (82 - 103)  BP: 104/68 (21 @ 18:00) (104/68 - 104/68)  RR: 20 (09-15-21 @ 09:00) (18 - 49)  SpO2: 100% (09-15-21 @ 09:00) (95% - 100%)  Wt(kg): --      21 @ 07:01  -  09-15-21 @ 07:00  --------------------------------------------------------  IN: 2120 mL / OUT: 1365 mL / NET: 755 mL    09-15-21 @ 07:01  -  09-15-21 @ 09:25  --------------------------------------------------------  IN: 120 mL / OUT: 0 mL / NET: 120 mL      I&O's Detail    14 Sep 2021 07:01  -  15 Sep 2021 07:00  --------------------------------------------------------  IN:    Enteral Tube Flush: 300 mL    IV PiggyBack: 500 mL    Miscellaneous Tube Feedin mL    sodium chloride 0.9%: 240 mL  Total IN: 2120 mL    OUT:    Drain (mL): 240 mL    Voided (mL): 1125 mL  Total OUT: 1365 mL    Total NET: 755 mL      15 Sep 2021 07:01  -  15 Sep 2021 09:25  --------------------------------------------------------  IN:    Miscellaneous Tube Feedin mL    sodium chloride 0.9%: 20 mL  Total IN: 120 mL    OUT:  Total OUT: 0 mL    Total NET: 120 mL          Physical Exam:  Gen: lying in bed +ngt  HEENT: +trach  Chest: respirations non labored  Abd: softly dt ttp +perc c tube  LE: no edema  Neuro: awake alert appropriate      LABS/STUDIES  --------------------------------------------------------------------------------              9.9    22.98 >-----------<  168      [09-15-21 @ 00:36]              31.0     132  |  96  |  11  ----------------------------<  137      [09-15-21 @ 00:36]  4.1   |  25  |  0.45        Ca     9.6     [09-15-21 @ 00:36]      Mg     1.8     [09-15-21 @ 00:36]      Phos  3.5     [09-15-21 @ 00:36]    TPro  7.0  /  Alb  3.0  /  TBili  2.4  /  DBili  x   /  AST  23  /  ALT  52  /  AlkPhos  258  [09-15-21 @ 00:36]              [09-15-21 @ 00:36]    Ca ionizedBlood Gas Arterial - Calcium, Ionized: 1.29 mmol/L (09-15-21 @ 00:02)  Blood Gas Arterial - Calcium, Ionized: 1.17 mmol/L (21 @ 00:07)    Creatinine Trend:  POC glucoseGlucose, Serum: 137 mg/dL (09-15-21 @ 00:36)  CAPILLARY BLOOD GLUCOSE      POCT Blood Glucose.: 142 mg/dL (15 Sep 2021 05:08)  POCT Blood Glucose.: 141 mg/dL (14 Sep 2021 23:57)  POCT Blood Glucose.: 139 mg/dL (14 Sep 2021 18:18)  POCT Blood Glucose.: 125 mg/dL (14 Sep 2021 11:48)    PrealbuminPrealbumin, Serum: 11 mg/dL (21 @ 04:01)  Prealbumin, Serum: 10 mg/dL (08-15-21 @ 13:53)    Triglycerides     [facility] NUTRITION SUPPORT-- FOLLOW UP NOTE  --------------------------------------------------------------------------------    24 hour events/subjective: pt seen and examined. febrile overnight, lines changed, cxs sent. at time of eval tolerating feeds at 50cc/hr, per rn overnight w episode of ?emesis, small amount, appeared to be secretions mixed w tf, feeds intermittently held and then resumed. pt c/o n/v/abd pain. +bms. slp eval noted. repeat xray showing tip of ngt in pylorus    Diet:  Diet, NPO with Tube Feed:   Tube Feeding Modality: Nasogastric  Vital 1.5 Tank (VITAL1.5RTH)  Total Volume for 24 Hours (mL): 1560  Continuous  Starting Tube Feed Rate mL per Hour: 10  Increase Tube Feed Rate by (mL): 5     Every 24 hours  Until Goal Tube Feed Rate (mL per Hour): 65  Tube Feed Duration (in Hours): 24  Tube Feed Start Time: 11:45  Supplement Feeding Modality:  Nasogastric  Probiotic Yogurt/Smoothie Cans or Servings Per Day:  2       Frequency:  Daily (09-10-21 @ 21:18)      PAST HISTORY  --------------------------------------------------------------------------------  No significant changes to PMH, PSH, FHx, SHx, unless otherwise noted    ALLERGIES & MEDICATIONS  --------------------------------------------------------------------------------  Allergies    Amiodarone Hydrochloride (Other (Severe))    Intolerances      Standing Inpatient Medications  cefepime   IVPB 1000 milliGRAM(s) IV Intermittent every 8 hours  cefepime   IVPB      chlorhexidine 2% Cloths 1 Application(s) Topical <User Schedule>  dextrose 40% Gel 15 Gram(s) Oral once  dextrose 50% Injectable 25 Gram(s) IV Push once  dextrose 50% Injectable 12.5 Gram(s) IV Push once  glucagon  Injectable 1 milliGRAM(s) IntraMuscular once  hydrocortisone sodium succinate Injectable 25 milliGRAM(s) IV Push <User Schedule>  insulin lispro (ADMELOG) corrective regimen sliding scale   SubCutaneous every 6 hours  lidocaine   4% Patch 1 Patch Transdermal daily  methimazole 15 milliGRAM(s) Oral <User Schedule>  metoclopramide Injectable 10 milliGRAM(s) IV Push every 8 hours  mirtazapine 7.5 milliGRAM(s) Oral daily  multivitamin 1 Tablet(s) Oral daily  pantoprazole  Injectable 40 milliGRAM(s) IV Push every 12 hours  propranolol 40 milliGRAM(s) Oral every 8 hours  sertraline 100 milliGRAM(s) Oral daily  sodium chloride 0.9%. 1000 milliLiter(s) IV Continuous <Continuous>  thiamine 100 milliGRAM(s) Oral daily  vancomycin    Solution 125 milliGRAM(s) Oral every 6 hours  vancomycin  IVPB      vancomycin  IVPB 500 milliGRAM(s) IV Intermittent every 12 hours    PRN Inpatient Medications  ondansetron Injectable 4 milliGRAM(s) IV Push every 6 hours PRN  simethicone 80 milliGRAM(s) Chew every 8 hours PRN  sodium chloride 0.9% lock flush 10 milliLiter(s) IV Push every 1 hour PRN        REVIEW OF SYSTEMS  --------------------------------------------------------------------------------  General:  as per hpi  HEENT:  no headaches, no vision changes, no ear pain, no epistaxis, no throat pain   CV:  no chest pain, no palpitations  Resp:  no dyspnea  GI:  as per hpi  :  no bleeding, no dysuria  Muscle:  no pain, no weakness  Neuro:  no numbness, no tingling, no memory problems  Psych:  no insomnia, no mood problems, no depression  Endocrine:  no cold/heat intolerance  Skin:  no rash, no edema        VITALS/PHYSICAL EXAM  --------------------------------------------------------------------------------  T(C): 36.4 (09-15-21 @ 08:00), Max: 38.7 (09-15-21 @ 04:00)  HR: 86 (09-15-21 @ 09:00) (82 - 103)  BP: 104/68 (21 @ 18:00) (104/68 - 104/68)  RR: 20 (09-15-21 @ 09:00) (18 - 49)  SpO2: 100% (09-15-21 @ 09:00) (95% - 100%)  Wt(kg): --      21 @ 07:01  -  09-15-21 @ 07:00  --------------------------------------------------------  IN: 2120 mL / OUT: 1365 mL / NET: 755 mL    09-15-21 @ 07:01  -  09-15-21 @ 09:25  --------------------------------------------------------  IN: 120 mL / OUT: 0 mL / NET: 120 mL      I&O's Detail    14 Sep 2021 07:01  -  15 Sep 2021 07:00  --------------------------------------------------------  IN:    Enteral Tube Flush: 300 mL    IV PiggyBack: 500 mL    Miscellaneous Tube Feedin mL    sodium chloride 0.9%: 240 mL  Total IN: 2120 mL    OUT:    Drain (mL): 240 mL    Voided (mL): 1125 mL  Total OUT: 1365 mL    Total NET: 755 mL      15 Sep 2021 07:01  -  15 Sep 2021 09:25  --------------------------------------------------------  IN:    Miscellaneous Tube Feedin mL    sodium chloride 0.9%: 20 mL  Total IN: 120 mL    OUT:  Total OUT: 0 mL    Total NET: 120 mL          Physical Exam:  Gen: lying in bed +ngt  HEENT: +trach  Chest: respirations non labored  Abd: softly dt ttp +perc c tube  LE: no edema  Neuro: awake alert appropriate      LABS/STUDIES  --------------------------------------------------------------------------------              9.9    22.98 >-----------<  168      [09-15-21 @ 00:36]              31.0     132  |  96  |  11  ----------------------------<  137      [09-15-21 @ 00:36]  4.1   |  25  |  0.45        Ca     9.6     [09-15-21 @ 00:36]      Mg     1.8     [09-15-21 @ 00:36]      Phos  3.5     [09-15-21 @ 00:36]    TPro  7.0  /  Alb  3.0  /  TBili  2.4  /  DBili  x   /  AST  23  /  ALT  52  /  AlkPhos  258  [09-15-21 @ 00:36]              [09-15-21 @ 00:36]    Ca ionizedBlood Gas Arterial - Calcium, Ionized: 1.29 mmol/L (09-15-21 @ 00:02)  Blood Gas Arterial - Calcium, Ionized: 1.17 mmol/L (21 @ 00:07)    Creatinine Trend:  POC glucoseGlucose, Serum: 137 mg/dL (09-15-21 @ 00:36)  CAPILLARY BLOOD GLUCOSE      POCT Blood Glucose.: 142 mg/dL (15 Sep 2021 05:08)  POCT Blood Glucose.: 141 mg/dL (14 Sep 2021 23:57)  POCT Blood Glucose.: 139 mg/dL (14 Sep 2021 18:18)  POCT Blood Glucose.: 125 mg/dL (14 Sep 2021 11:48)    PrealbuminPrealbumin, Serum: 11 mg/dL (21 @ 04:01)  Prealbumin, Serum: 10 mg/dL (08-15-21 @ 13:53)    Triglycerides

## 2021-09-15 NOTE — PROGRESS NOTE ADULT - PROBLEM SELECTOR PLAN 2
- Continue current speed of 9200 RPM. Speed increased this admission due to signs of inadequately unloaded left ventricle  - MAP is at goal at this time  - Holding coumadin and aspirin indefinitely given recurrent GI bleeding.   - LDH level increased today to 259 from 122. Remains on AC as stated above. Continue to monitor levels daily

## 2021-09-15 NOTE — PROGRESS NOTE ADULT - SUBJECTIVE AND OBJECTIVE BOX
RICKY HAMPTON  MRN-[id number]  Patient is a 66y old  Male who presents with a chief complaint of Anemia, Supratherapeutic INR, Dark Stools (15 Sep 2021 09:25)    HPI:  65M PMH ACC/AHA stage D HF due to NICM HM2 LVAD , TV annuloplasty ring 17 as destination therapy due to severe peripheral artery disease with significant stenosis  SIADH, Depression, CKD-3 with hyperkalemia, past E. coli UTIs, driveline drainage (21) and COVID-19 (back in 2020)  He was recently seen in clinic where he complained of abdominal pain and dark stools w constipation back in May. He presents to [facility] ER today weakness and fatigue, moderate and + Black stools for three days, on coumadin secondary to warfarin use in the setting of an LVAD. Patient has required transfusions for GIB in the past. Mostly recently back in 2021 pt had anemia with dark stools. No interventions was done at that time. However Last Endoscopy was done in 2020 (negative). Today labs show patient is anemic with H/H of 4.5/16.3,. INR is 8.84 MAP in the 90s, Temp 35.1. He denies any chest pain, shortness of breath, dizziness, abd pain, nausea or vomiting.     (2021 16:57)      Surgery/Hospital Course:   admit for melena w/ anemia, INR 8.84   6/15 Capsul study (+) for small bowel bleed, balloon endoscopy (old blood in prox ileum); post EGD - septic w/ L opacity, re-intubated for concern for aspiration, TTE (Mod MR, decrease biV w/ interventricular septum boweing towards R)   bronch    +C Diff    CT C/A/P: Fluid filled colon which may be 2/2 rapid transit. Small bilateral pleffs with associates. Compressive atelectasis New ISABELLE & LLL  parenchymal opacities, suspicious for pneumonia. 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    #8 Shiley trach at bedside    LVAD speed increased to 9200  / Bronch   TC since . Patient transferred to SDU.    INR today 2.64.  H&H 7.3/24 this AM.  Will repeat CBC at noon, and will send stool guaiac Patient with persistent abdominal tenderness, rate of tube feeds decreased.  No nausea/vomiting.     INR today 2.4. H&H 9.1/28.6 low flow overnight /N&V, refusing Tube feeds on D5 1/2 normal  @50 cc/hr   INR 2.69  H&H 7.7/.1 refusing Tube feeds on D5 1/2 normal  @50 cc/hr. This am + BM Melena Dr Oneill HF  aware- PRBC x1  GI team consulted -  NPO  plan on study in am-  D/w Dr Cadet Patient  to return to CTU for further management; 1PRBCS    Post op INR 2.2 today.  No bleeding. BC + for SM.  Pt is hypotensive requiring pressor and inotropic support.  ID follow up today on Cefepime will follow.   R PTC for PTX    CT C/A/P: sub q emphysema in R chest wall, GGO RUL, small ascites CTH negative; Abd US: GB thickening, pericholecystic fluid     Perchole drain in place continues to drain total output overnight 133.  Fever today 38.8    duplex LE negative    Patient with persistent abdominal pain, refusing tube feeds and medications, Psych consulted   CTA A/P ordered to r/o mesenteric ischemia 2/2 persistent anorexia, nausea, vomiting. Revealed:  Evaluation of the mesenteric vessels is limited by streak artifact from LVAD. There appears to be severe stenosis of the proximal SMA; abdominal mesenteric doppler is recommended for further evaluation. 2.  No small bowel findings to suggest acute mesenteric ischemia. 3.  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  8/15: Cultures resulted BC 1/2 +GPC in clusters, SC enterobacter; mesenteric duplex: borderline stenosis of proximal SMA  : CT C/A/P noncon: Nondular opacities in R lung apex w/cavitation, abd nl  :  Continue current care, treatment of thyrotoxicosis with medications as per endocrine, d/c ABX as per team.    RUQ sono: Contracted gallbladder with cholecystostomy tube in place.  9/10 failed SMA stent, L fem PSA, s/p 3U PRCB   CTA Abd/Pelvis L RP hematoma     Today:  Tolerating tube feeds, Vital 1.5 shantelle @ 50cc/hr, continue increasing 5cc/day for goal of 65cc/hr, plan to advance NG tube today. Febrile overnight, TMAX 101.7F, lines changed overnight, UA negative, SCx  + few GNR, will f/u and continue Cefepime and Vancomycin for empiric coverage. LDH up trending 201->211->259, will continue to monitor closely.     REVIEW OF SYSTEMS:  Patient speaks over trach   Gen: No fever  EYES/ENT: No visual changes;  No vertigo or throat pain   NECK: No pain   RES:  No shortness of breath or Cough  CARD: No chest pain   GI: No abdominal pain  : No dysuria  NEURO: No weakness  SKIN: No itching, rashes     ICU Vital Signs Last 24 Hrs  T(C): 36.4 (15 Sep 2021 08:00), Max: 38.7 (15 Sep 2021 04:00)  T(F): 97.6 (15 Sep 2021 08:00), Max: 101.7 (15 Sep 2021 04:00)  HR: 90 (15 Sep 2021 10:00) (82 - 103)  BP: 104/68 (14 Sep 2021 18:00) (104/68 - 104/68)  BP(mean): 81 (14 Sep 2021 18:00) (81 - 81)  ABP: 113/71 (15 Sep 2021 10:00) (79/60 - 121/70)  ABP(mean): 86 (15 Sep 2021 10:00) (66 - 88)  RR: 18 (15 Sep 2021 10:00) (18 - 49)  SpO2: 100% (15 Sep 2021 10:00) (95% - 100%)      Physical Exam:  Gen:  Awake and alert, Sitting in chair in NAD.  Psych: Mood greatly improved but waxes and wanes, more interactive and participating in care. Would like to be more included in bedside rounding.  CNS:  intact, nonfocal, responds to all commands  Neck: no JVD, +TC   RES : course breath sounds, no wheezing, moderate tan secretions able to cough most up on own         CVS: +LVAD hum   Abd: Soft, RUQ tenderness by perc sybil site. Sybil drain with bilious fluid. Positive BS throughout.  Skin: No rash  Ext:  no edema    ============================I/O===========================   I&O's Detail    14 Sep 2021 07:01  -  15 Sep 2021 07:00  --------------------------------------------------------  IN:    Enteral Tube Flush: 300 mL    IV PiggyBack: 500 mL    Miscellaneous Tube Feedin mL    sodium chloride 0.9%: 240 mL  Total IN: 2120 mL    OUT:    Drain (mL): 240 mL    Voided (mL): 1125 mL  Total OUT: 1365 mL    Total NET: 755 mL      15 Sep 2021 07:01  -  15 Sep 2021 10:32  --------------------------------------------------------  IN:    Miscellaneous Tube Feedin mL    sodium chloride 0.9%: 40 mL  Total IN: 240 mL    OUT:  Total OUT: 0 mL    Total NET: 240 mL        ============================ LABS =========================                        9.9    22.98 )-----------( 168      ( 15 Sep 2021 00:36 )             31.0     -15    132<L>  |  96  |  11  ----------------------------<  137<H>  4.1   |  25  |  0.45<L>    Ca    9.6      15 Sep 2021 00:36  Phos  3.5     -15  Mg     1.8     -15    TPro  7.0  /  Alb  3.0<L>  /  TBili  2.4<H>  /  DBili  x   /  AST  23  /  ALT  52<H>  /  AlkPhos  258<H>  09-15    LIVER FUNCTIONS - ( 15 Sep 2021 00:36 )  Alb: 3.0 g/dL / Pro: 7.0 g/dL / ALK PHOS: 258 U/L / ALT: 52 U/L / AST: 23 U/L / GGT: x             ABG - ( 15 Sep 2021 00:02 )  pH, Arterial: 7.42  pH, Blood: x     /  pCO2: 47    /  pO2: 109   / HCO3: 30    / Base Excess: 5.3   /  SaO2: 99.5              Urinalysis Basic - ( 14 Sep 2021 13:47 )    Color: Dark Yellow / Appearance: Slightly Turbid / S.025 / pH: x  Gluc: x / Ketone: Negative  / Bili: Negative / Urobili: Negative   Blood: x / Protein: 30 mg/dL / Nitrite: Negative   Leuk Esterase: Negative / RBC: 3 /hpf / WBC 1 /HPF   Sq Epi: x / Non Sq Epi: 1 /hpf / Bacteria: Negative      ======================Micro/Rad/Cardio=================  Culture: Reviewed   CXR: Reviewed  Echo:Reviewed  ======================================================  PAST MEDICAL & SURGICAL HISTORY:  CHF (congestive heart failure)    CAD (coronary artery disease)    Depression    Pleural effusion    History of 2019 novel coronavirus disease (COVID-19)  2020    Hemorrhoids    Bleeding hemorrhoids    Peripheral arterial disease    Claudication    BPH with urinary obstruction    ACC/AHA stage D systolic heart failure    Anticoagulation goal of INR 2.0 to 2.5    Falls    Clavicle fracture    CKD (chronic kidney disease), stage III    Iron deficiency anemia    H/O epistaxis    Vertigo    GI bleed    S/P TVR (tricuspid valve repair)    S/P ventricular assist device    S/P endoscopy      ====================ASSESSMENT ==============  Stage D Nonischemic Cardiomyopathy, Status Post HM2 on 2017    Cardiogenic shock  Hemodynamic instability   Acute hypoxemic respiratory failure s/p trach   GI bleed , Status Post Enteroscopy   Anemia, in setting of melena   Chronic Kidney Disease  Stress hyperglycemia   C.diff positive on    Hypovolemic shock  Septic shock  Leukocytosis  GB thickening/percholecystic s/p perc choley by IT   SMA stenosis  Serratia/citrobacter pneumonia   Stenotrophomonas pneumonia   Enterobacter pneumonia   Nasuea/vomiting  Deconditioning    Plan:  ====================== NEUROLOGY=====================  C/w mirtazapine and sertraline for depression  Deconditioned, PT/Ambulate as tolerated    mirtazapine 7.5 milliGRAM(s) Oral daily  sertraline 100 milliGRAM(s) Oral daily    ==================== RESPIRATORY======================  Acute hypoxemic respiratory failure s/p #8 shiley trach on , continue TC as tolerated (TC since )  Continue close monitoring of respiratory rate and breathing pattern, following of ABG’s with A-line monitoring, continuous pulse oximetry monitoring.   Moderate secretions, continue suctioning prn, pulmonary toileting.     ====================CARDIOVASCULAR==================  Stage D Nonischemic Cardiomyopathy, Status Post HM2 on 2017; LVAD settings 9200, flow 5.4   TTE : reveals at least moderate MR, mild AI, severe global LV syst dysfxn, dec RV fxn   Propranolol for rate control of HR 2/2 Hyperthyroidism, titrate as tolerated per Endo  Continue invasive hemodynamic monitoring     propranolol 40 milliGRAM(s) Oral every 8 hours    ===================HEMATOLOGIC/ONC ===================  CTA A/P on  +L RP hematoma   Acute blood loss anemia, monitor H&H/Plts    Holding coumadin and ASA given recurrent GI bleeding; LDH up trending 201->211->259, will continue to monitor closely.     ===================== RENAL =========================  Continue monitoring urine output, I&OS, BUN/Cr   Euvolemic, even fluid balance, currently off diuretics.    Replete lytes PRN. Keep K> 4 and Mg >2     ==================== GASTROINTESTINAL===================  S/p cholecystostomy tube placed on  with IR   CTA A/P : Evaluation of the mesenteric vessels is limited by streak artifact from LVAD. There appears to be severe stenosis of the proximal SMA; abdominal mesenteric doppler is recommended for further evaluation. 2.  No small bowel findings to suggest acute mesenteric ischemia. 3.  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  Mesenteric duplex on 8/15 borderline stenosis of proximal SMA, s/p failed SMA stent, L fem PSA on 9/10   Tolerating tube feeds, Vital 1.5 shantelle @ 50cc/hr, continue increasing 5cc/day for goal of 65cc/hr, plan to advance NG tube today  CTA A/P on  +L RP hematoma   Reglan for gut motility  Zofran PRN nausea/vomiting    multivitamin 1 Tablet(s) Oral daily  GI prophylaxis, pantoprazole  Injectable 40 milliGRAM(s) IV Push every 12 hours  simethicone 80 milliGRAM(s) Chew every 8 hours PRN Gas  sodium chloride 0.9% lock flush 10 milliLiter(s) IV Push every 1 hour PRN Pre/post blood products, medications, blood draw, and to maintain line patency  sodium chloride 0.9%. 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 - Free T4 remains elevated   Per endocrine      - Thyroid US when trach is out      - Propanolol as above for optimal T4-->T3 conversion      - c/w Methimazole ---> consider changing to rectal if continues to have emesis for better absorption      - c/w hydrocortisone taper as per endo recs     dextrose 40% Gel 15 Gram(s) Oral once  dextrose 50% Injectable 25 Gram(s) IV Push once  dextrose 50% Injectable 12.5 Gram(s) IV Push once  glucagon  Injectable 1 milliGRAM(s) IntraMuscular once  hydrocortisone sodium succinate Injectable 25 milliGRAM(s) IV Push <User Schedule>  insulin lispro (ADMELOG) corrective regimen sliding scale   SubCutaneous every 6 hours  methimazole 15 milliGRAM(s) Oral <User Schedule>    ========================INFECTIOUS DISEASE================  Febrile overnight, TMAX 101.7F, WBC rising 21.31->22.98  Continue trending WBC and monitoring fever curve   Lines changed overnight, UA negative, SCx  + few GNR, will f/u   Continue Cefepime and Vancomycin for empiric coverage  Culture from sybil drain on : NGTD    cefepime   IVPB 1000 milliGRAM(s) IV Intermittent every 8 hours  vancomycin    Solution 125 milliGRAM(s) Oral every 6 hours  vancomycin  IVPB 500 milliGRAM(s) IV Intermittent every 12 hours      Patient requires continuous monitoring with bedside rhythm monitoring, pulse ox monitoring, and intermittent blood gas analysis. Care plan discussed with ICU care team. Patient remained critical and at risk for life threatening decompensation.     By signing my name below, IAgnieszka, attest that this documentation has been prepared under the direction and in the presence of CLAUDIA Mejia   Electronically signed: Cesia Shaffer, 09-15-21 @ 10:32    Go STANLEY, personally performed the services described in this documentation. all medical record entries made by the scribe were at my direction and in my presence. I have reviewed the chart and agree that the record reflects my personal performance and is accurate and complete  Electronically signed: CLAUDIA Mejia        RICKY HAMPTON  MRN-[id number]  Patient is a 66y old  Male who presents with a chief complaint of Anemia, Supratherapeutic INR, Dark Stools (15 Sep 2021 09:25)    HPI:  65M PMH ACC/AHA stage D HF due to NICM HM2 LVAD , TV annuloplasty ring 17 as destination therapy due to severe peripheral artery disease with significant stenosis  SIADH, Depression, CKD-3 with hyperkalemia, past E. coli UTIs, driveline drainage (21) and COVID-19 (back in 2020)  He was recently seen in clinic where he complained of abdominal pain and dark stools w constipation back in May. He presents to [facility] ER today weakness and fatigue, moderate and + Black stools for three days, on coumadin secondary to warfarin use in the setting of an LVAD. Patient has required transfusions for GIB in the past. Mostly recently back in 2021 pt had anemia with dark stools. No interventions was done at that time. However Last Endoscopy was done in 2020 (negative). Today labs show patient is anemic with H/H of 4.5/16.3,. INR is 8.84 MAP in the 90s, Temp 35.1. He denies any chest pain, shortness of breath, dizziness, abd pain, nausea or vomiting.     (2021 16:57)      Surgery/Hospital Course:   admit for melena w/ anemia, INR 8.84   6/15 Capsul study (+) for small bowel bleed, balloon endoscopy (old blood in prox ileum); post EGD - septic w/ L opacity, re-intubated for concern for aspiration, TTE (Mod MR, decrease biV w/ interventricular septum boweing towards R)   bronch    +C Diff    CT C/A/P: Fluid filled colon which may be 2/2 rapid transit. Small bilateral pleffs with associates. Compressive atelectasis New ISABELLE & LLL  parenchymal opacities, suspicious for pneumonia. 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    #8 Shiley trach at bedside    LVAD speed increased to 9200  / Bronch   TC since . Patient transferred to SDU.    INR today 2.64.  H&H 7.3/24 this AM.  Will repeat CBC at noon, and will send stool guaiac Patient with persistent abdominal tenderness, rate of tube feeds decreased.  No nausea/vomiting.     INR today 2.4. H&H 9.1/28.6 low flow overnight /N&V, refusing Tube feeds on D5 1/2 normal  @50 cc/hr   INR 2.69  H&H 7.7/.1 refusing Tube feeds on D5 1/2 normal  @50 cc/hr. This am + BM Melena Dr Oneill HF  aware- PRBC x1  GI team consulted -  NPO  plan on study in am-  D/w Dr Cadet Patient  to return to CTU for further management; 1PRBCS    Post op INR 2.2 today.  No bleeding. BC + for SM.  Pt is hypotensive requiring pressor and inotropic support.  ID follow up today on Cefepime will follow.   R PTC for PTX    CT C/A/P: sub q emphysema in R chest wall, GGO RUL, small ascites CTH negative; Abd US: GB thickening, pericholecystic fluid     Perchole drain in place continues to drain total output overnight 133.  Fever today 38.8    duplex LE negative    Patient with persistent abdominal pain, refusing tube feeds and medications, Psych consulted   CTA A/P ordered to r/o mesenteric ischemia 2/2 persistent anorexia, nausea, vomiting. Revealed:  Evaluation of the mesenteric vessels is limited by streak artifact from LVAD. There appears to be severe stenosis of the proximal SMA; abdominal mesenteric doppler is recommended for further evaluation. 2.  No small bowel findings to suggest acute mesenteric ischemia. 3.  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  8/15: Cultures resulted BC 1/2 +GPC in clusters, SC enterobacter; mesenteric duplex: borderline stenosis of proximal SMA  : CT C/A/P noncon: Nondular opacities in R lung apex w/cavitation, abd nl  :  Continue current care, treatment of thyrotoxicosis with medications as per endocrine, d/c ABX as per team.    RUQ sono: Contracted gallbladder with cholecystostomy tube in place.  9/10 failed SMA stent, L fem PSA, s/p 3U PRCB   CTA Abd/Pelvis L RP hematoma     Today:  Tolerating tube feeds, Vital 1.5 shantelle @ 50cc/hr, continue increasing 5cc/day for goal of 65cc/hr, plan to advance NG tube today. Febrile overnight, TMAX 101.7F, lines changed overnight, UA negative, SCx  + few GNR, will f/u and continue Cefepime and Vancomycin for empiric coverage. LDH up trending 201->211->259, will continue to monitor closely.     REVIEW OF SYSTEMS:  Patient speaks over trach   Gen: No fever  EYES/ENT: No visual changes;  No vertigo or throat pain   NECK: No pain   RES:  No shortness of breath or Cough  CARD: No chest pain   GI: No abdominal pain  : No dysuria  NEURO: No weakness  SKIN: No itching, rashes     ICU Vital Signs Last 24 Hrs  T(C): 36.4 (15 Sep 2021 08:00), Max: 38.7 (15 Sep 2021 04:00)  T(F): 97.6 (15 Sep 2021 08:00), Max: 101.7 (15 Sep 2021 04:00)  HR: 90 (15 Sep 2021 10:00) (82 - 103)  BP: 104/68 (14 Sep 2021 18:00) (104/68 - 104/68)  BP(mean): 81 (14 Sep 2021 18:00) (81 - 81)  ABP: 113/71 (15 Sep 2021 10:00) (79/60 - 121/70)  ABP(mean): 86 (15 Sep 2021 10:00) (66 - 88)  RR: 18 (15 Sep 2021 10:00) (18 - 49)  SpO2: 100% (15 Sep 2021 10:00) (95% - 100%)      Physical Exam:  Gen:  Awake and alert, Sitting in chair in NAD.  Psych: Mood greatly improved but waxes and wanes, more interactive and participating in care. Would like to be more included in bedside rounding.  CNS:  intact, nonfocal, responds to all commands  Neck: no JVD, +TC   RES : course breath sounds, no wheezing, moderate tan secretions able to cough most up on own         CVS: +LVAD hum   Abd: Soft, RUQ tenderness by perc sybil site. Sybil drain with bilious fluid. Positive BS throughout.  Skin: No rash  Ext:  no edema    ============================I/O===========================   I&O's Detail    14 Sep 2021 07:01  -  15 Sep 2021 07:00  --------------------------------------------------------  IN:    Enteral Tube Flush: 300 mL    IV PiggyBack: 500 mL    Miscellaneous Tube Feedin mL    sodium chloride 0.9%: 240 mL  Total IN: 2120 mL    OUT:    Drain (mL): 240 mL    Voided (mL): 1125 mL  Total OUT: 1365 mL    Total NET: 755 mL      15 Sep 2021 07:01  -  15 Sep 2021 10:32  --------------------------------------------------------  IN:    Miscellaneous Tube Feedin mL    sodium chloride 0.9%: 40 mL  Total IN: 240 mL    OUT:  Total OUT: 0 mL    Total NET: 240 mL        ============================ LABS =========================                        9.9    22.98 )-----------( 168      ( 15 Sep 2021 00:36 )             31.0     -15    132<L>  |  96  |  11  ----------------------------<  137<H>  4.1   |  25  |  0.45<L>    Ca    9.6      15 Sep 2021 00:36  Phos  3.5     -15  Mg     1.8     -15    TPro  7.0  /  Alb  3.0<L>  /  TBili  2.4<H>  /  DBili  x   /  AST  23  /  ALT  52<H>  /  AlkPhos  258<H>  09-15    LIVER FUNCTIONS - ( 15 Sep 2021 00:36 )  Alb: 3.0 g/dL / Pro: 7.0 g/dL / ALK PHOS: 258 U/L / ALT: 52 U/L / AST: 23 U/L / GGT: x             ABG - ( 15 Sep 2021 00:02 )  pH, Arterial: 7.42  pH, Blood: x     /  pCO2: 47    /  pO2: 109   / HCO3: 30    / Base Excess: 5.3   /  SaO2: 99.5              Urinalysis Basic - ( 14 Sep 2021 13:47 )    Color: Dark Yellow / Appearance: Slightly Turbid / S.025 / pH: x  Gluc: x / Ketone: Negative  / Bili: Negative / Urobili: Negative   Blood: x / Protein: 30 mg/dL / Nitrite: Negative   Leuk Esterase: Negative / RBC: 3 /hpf / WBC 1 /HPF   Sq Epi: x / Non Sq Epi: 1 /hpf / Bacteria: Negative      ======================Micro/Rad/Cardio=================  Culture: Reviewed   CXR: Reviewed  Echo:Reviewed  ======================================================  PAST MEDICAL & SURGICAL HISTORY:  CHF (congestive heart failure)    CAD (coronary artery disease)    Depression    Pleural effusion    History of 2019 novel coronavirus disease (COVID-19)  2020    Hemorrhoids    Bleeding hemorrhoids    Peripheral arterial disease    Claudication    BPH with urinary obstruction    ACC/AHA stage D systolic heart failure    Anticoagulation goal of INR 2.0 to 2.5    Falls    Clavicle fracture    CKD (chronic kidney disease), stage III    Iron deficiency anemia    H/O epistaxis    Vertigo    GI bleed    S/P TVR (tricuspid valve repair)    S/P ventricular assist device    S/P endoscopy      ====================ASSESSMENT ==============  Stage D Nonischemic Cardiomyopathy, Status Post HM2 on 2017    Cardiogenic shock  Hemodynamic instability   Acute hypoxemic respiratory failure s/p trach   GI bleed , Status Post Enteroscopy   Anemia, in setting of melena   Chronic Kidney Disease  Stress hyperglycemia   C.diff positive on    Hypovolemic shock  Septic shock  Leukocytosis  GB thickening/percholecystic s/p perc choley by IT   SMA stenosis  Serratia/citrobacter pneumonia   Stenotrophomonas pneumonia   Enterobacter pneumonia   Nasuea/vomiting  Deconditioning    Plan:  ====================== NEUROLOGY=====================  C/w mirtazapine and sertraline for depression  Deconditioned, PT/Ambulate as tolerated    mirtazapine 7.5 milliGRAM(s) Oral daily  sertraline 100 milliGRAM(s) Oral daily    ==================== RESPIRATORY======================  Acute hypoxemic respiratory failure s/p #8 shiley trach on , continue TC as tolerated (TC since )  Continue close monitoring of respiratory rate and breathing pattern, following of ABG’s with A-line monitoring, continuous pulse oximetry monitoring.   Moderate secretions, continue suctioning prn, pulmonary toileting.     ====================CARDIOVASCULAR==================  Stage D Nonischemic Cardiomyopathy, Status Post HM2 on 2017; LVAD settings 9200, flow 5.4   TTE : reveals at least moderate MR, mild AI, severe global LV syst dysfxn, dec RV fxn   Propranolol for rate control of HR 2/2 Hyperthyroidism, titrate as tolerated per Endo  Continue invasive hemodynamic monitoring     propranolol 40 milliGRAM(s) Oral every 8 hours    ===================HEMATOLOGIC/ONC ===================  CTA A/P on  +L RP hematoma   Acute blood loss anemia, monitor H&H/Plts    Holding coumadin and ASA given recurrent GI bleeding; LDH up trending 201->211->259, will continue to monitor closely.     ===================== RENAL =========================  Continue monitoring urine output, I&OS, BUN/Cr   Euvolemic, even fluid balance, currently off diuretics.    Replete lytes PRN. Keep K> 4 and Mg >2     ==================== GASTROINTESTINAL===================  S/p cholecystostomy tube placed on  with IR   CTA A/P : Evaluation of the mesenteric vessels is limited by streak artifact from LVAD. There appears to be severe stenosis of the proximal SMA; abdominal mesenteric doppler is recommended for further evaluation. 2.  No small bowel findings to suggest acute mesenteric ischemia. 3.  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  Mesenteric duplex on 8/15 borderline stenosis of proximal SMA, s/p failed SMA stent, L fem RP hematoma on 9/10, s/p 3U PRBC. Plan per Vascular surgeon Dr. Bran to re-attempt SMA stent by brachial cutdown when patient deemed stable to do so by Heart Failure team.   Tolerating tube feeds, Vital 1.5 shantelle @ 50cc/hr, continue increasing 5cc/day for goal of 65cc/hr, plan to advance NG tube today to attempt to get post-pyloric  CTA A/P on  +L RP hematoma   Reglan for gut motility  Zofran PRN nausea/vomiting    multivitamin 1 Tablet(s) Oral daily  GI prophylaxis, pantoprazole  Injectable 40 milliGRAM(s) IV Push every 12 hours  simethicone 80 milliGRAM(s) Chew every 8 hours PRN Gas  sodium chloride 0.9% lock flush 10 milliLiter(s) IV Push every 1 hour PRN Pre/post blood products, medications, blood draw, and to maintain line patency  sodium chloride 0.9%. 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 - Free T4 remains elevated   Per endocrine      - Thyroid US when trach is out      - Propanolol as above for optimal T4-->T3 conversion      - c/w Methimazole ---> consider changing to rectal if continues to have emesis for better absorption      - c/w hydrocortisone taper as per endo recs     dextrose 40% Gel 15 Gram(s) Oral once  dextrose 50% Injectable 25 Gram(s) IV Push once  dextrose 50% Injectable 12.5 Gram(s) IV Push once  glucagon  Injectable 1 milliGRAM(s) IntraMuscular once  hydrocortisone sodium succinate Injectable 25 milliGRAM(s) IV Push <User Schedule>  insulin lispro (ADMELOG) corrective regimen sliding scale   SubCutaneous every 6 hours  methimazole 15 milliGRAM(s) Oral <User Schedule>    ========================INFECTIOUS DISEASE================  Febrile overnight, TMAX 101.7F, WBC rising 21.31->22.98. Lines changed overnight, UA negative, SCx  + few GNR, will f/u   Continue trending WBC and monitoring fever curve   Continue Cefepime and Vancomycin for empiric coverage  Culture from sybil drain on : NGTD  Continue PO vanco for cdiff prophylaxis    cefepime   IVPB 1000 milliGRAM(s) IV Intermittent every 8 hours  vancomycin    Solution 125 milliGRAM(s) Oral every 6 hours  vancomycin  IVPB 500 milliGRAM(s) IV Intermittent every 12 hours    I have spent 35 minutes of noncontinuous critical care time with this patient    Patient requires continuous monitoring with bedside rhythm monitoring, pulse ox monitoring, and intermittent blood gas analysis. Care plan discussed with ICU care team. Patient remained critical and at risk for life threatening decompensation.     By signing my name below, I, Agnieszka Cherry, attest that this documentation has been prepared under the direction and in the presence of CLAUDIA Mejia   Electronically signed: Cesia Shaffer, 09-15-21 @ 10:32    I, Go Sellers, personally performed the services described in this documentation. all medical record entries made by the luisibe were at my direction and in my presence. I have reviewed the chart and agree that the record reflects my personal performance and is accurate and complete  Electronically signed: CLAUDIA Mejia

## 2021-09-15 NOTE — PROGRESS NOTE ADULT - ASSESSMENT
67 YO M with a history of stage D NICM s/p HM2 on 9/2017 as DT (due to severe PAD) with TV ring, prior COVID-19 infection 4/2020, recurrent syncope post LVAD s/p ILR, and chronic abdominal pain with prior negative workup who was admitted 6/14/21 with symptomatic anemia with Hgb 4.5 in setting of INR 8.8 without hemodynamic instability and has since had a protracted hospitalization. He was transfused and underwent VCE which showed active bleeding in the mid small bowel but subsequent enteroscopy 6/15 did not reveal any active bleeding. He acutely decompensated after procedure with fever/hypertension, low flow alarms, and pulmonary infiltrate with hypoxia requiring intubation from probable aspiration PNA. He was unable to extubated and has since undergone tracheostomy but tolerating persistent trach collar and nearing ability for decannulation. 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 Short term goal is maintenance of adequate nutrition and eventual trach decannulation.     Underwent mesenteric angiogram on 9/10 with vascular surgery. Found to have SMA stenosis at its origin but unable to place stent. Intra-op received 3uPRBC and required fem stop placement for L pseudoaneurysm. Post-op continued to have drop in H/H requiring additional 2u PRBC (last on 9/12), CT angio revealed left-sided retroperitoneal hematoma. He continues to have worsening leukocytosis and low grade fevers (Tmax 101.7). All cultures have remained NGTD, all lines were exchanged and patient was remains on broad spectrum abx.

## 2021-09-15 NOTE — PROGRESS NOTE ADULT - ATTENDING COMMENTS
Recovering after vascular complication (pseudoaneurysm and RP bleed) during mesenteric angiogram last week. Still with pain. Tolerating tube feeds. Will consider repeat attempt at SMA stenting once he recovers from acute events of last week. Follow up culture results, agree with broad spectrum antibiotics.

## 2021-09-15 NOTE — PROGRESS NOTE ADULT - ASSESSMENT
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 coli UTIs, driveline drainage (1/7/21) and COVID-19, here initially for GIB prolonged hospital course, s/p tracheostomy, acalculous cholecystitis s/p perc dawn. Patient has persistent intermittent abdominal pain, and was being evaluated for chronic mesenteric ischemia vs SMA syndrome.  8/13 bld cxs positive. TPN was consulted for Protein Calorie Malnutrition, Prealb 11, prior a1c 5.6, tg 141. Mesenteric duplex showied borderline stenosis of prox SMA,  Pt now s/p Mesenteric angiogram 9/10 unable to place stent. Pt had been tolerating feeds but with intermittent abd pain. SLP eval had recommended NPO status. Course c/b left rph and fevers    -cont tube feeds and advance to goal as tolerated as per ctu (Vital 1.5 goal 65cc/hr); no plans for TPN at this time  -suspect hyperthyroidism contributing in part to metabolic issues ie. cAMP effects GI motility, hypercalcemia, tachycardia; improved, management as per endo  -sma stenosis- sp dx mesenteric angiogram by vascular but unable to pass stent, f/u further recs  -rph- cta ap noted, trend cbc, transfuse prn  -h/o abdominal pain/vomiting- as above, Zofran prn for nausea; Reglan for gut motility  -fever- cont abx, follow cxs, care as per ID  -electrolyte imbalance risk- monitor and replete elytes as needed; trend lfts  -management as per CTU; will remain available as needed    plan discussed with Dr. Soliz and Dr ANNE Tang, agreeable with above    TPN pager [phone], spectra [phone]  M-F 6A-4P, Weekends and holidays 8A-12P

## 2021-09-15 NOTE — CHART NOTE - NSCHARTNOTEFT_GEN_A_CORE
Nutrition Follow Up Note  Patient seen for: Malnutrition follow-up    Chart reviewed, events noted. 66M, PMH ACC/AHA stage D HF 2/2 NICM s/p HM2 LVAD (2017). Here initially for GIB prolonged hospital course, s/p tracheostomy, acalculous cholecystitis s/p perc dawn. Patient c persistent intermittent abdominal pain, per team, possible mesenteric ischemia from possible SMA stenosis - . Tolerating TC     Source: EMR, Team/HF Rounds    Diet, NPO with Tube Feed:   Tube Feeding Modality: Nasogastric  Vital 1.5 Tank (VITAL1.5RTH)  Total Volume for 24 Hours (mL): 1560  Continuous  Starting Tube Feed Rate {mL per Hour}: 10  Increase Tube Feed Rate by (mL): 5     Every 24 hours  Until Goal Tube Feed Rate (mL per Hour): 65  Tube Feed Duration (in Hours): 24  Tube Feed Start Time: 11:45  Supplement Feeding Modality:  Nasogastric  Probiotic Yogurt/Smoothie Cans or Servings Per Day:  2       Frequency:  Daily (09-10-21 @ 21:18)    Current EN regimen provides: 1560ml formula, 2340kcals, 105g protein, 1192ml free H2O    EN provisions per chart:  Currently infusing @50ml/hr - slowly advancing to goal per team  () __% EN goal volume achieved  () __% EN goal volume achieved  () __% EN goal volume achieved  () 58% EN goal volume achieved  (9/10) 51% EN goal volume achieved  - Pt has received __% EN provisions in the last 5 days      Nutrition-related concerns:  - EN held for 2 hrs overnight 2/2 small volume emesis; Per Team NGT to be advanced post-pyloric today  - Pt previously receiving TwoCal; discussion with Team on  to switch to Vital 1.5 s/p episode of emesis x2  - Biliary drain in place  - Reglan & Remeron ordered daily  - Multivitamin and thiamin supplementation ordered daily  - NPH & Insulin Sliding Scale ordered to optimize BG; noted to be receiving steroids which should be completed today (9/15)    GI:  Last BM 9/15.   Bowel Regimen? [] Yes   [x] No  - Zofran ordered PRN    Weight in k.7 (09-15), 60.5 (), 61.9 (), 66.5 (), 59.7 (09-10), 63.5 (), 61.4 (), 60.4 (), 59 (), 58.6 (), 59 (), 58.4 (), 63 (), 66.2 (), 67.7 (), 71.2 ()    Weight history:   - Admission weight: 176.3 pounds / 80 kg (6/15)  significant wt loss noted; wt trending up    MEDICATIONS  (STANDING):  cefepime   IVPB  cefepime   IVPB  dextrose 40% Gel  dextrose 50% Injectable  dextrose 50% Injectable  glucagon  Injectable  hydrocortisone sodium succinate Injectable  insulin lispro (ADMELOG) corrective regimen sliding scale  methimazole  multivitamin  pantoprazole  Injectable  propranolol  sodium chloride 0.9%.  thiamine  vancomycin    Solution  vancomycin  IVPB  vancomycin  IVPB    Pertinent Labs: 09-15 @ 00:36: Na 132<L>, BUN 11, Cr 0.45<L>, <H>, K+ 4.1, Phos 3.5, Mg 1.8, Alk Phos 258<H>, ALT/SGPT 52<H>, AST/SGOT 23, HbA1c --    A1C with Estimated Average Glucose Result: 5.4 % (08-15-21 @ 13:52)  A1C with Estimated Average Glucose Result: 5.6 % (06-15-21 @ 02:42)    Finger Sticks:  POCT Blood Glucose.: 142 mg/dL (09-15 @ 05:08)  POCT Blood Glucose.: 141 mg/dL ( @ 23:57)  POCT Blood Glucose.: 139 mg/dL ( @ 18:18)  POCT Blood Glucose.: 125 mg/dL ( @ 11:48)    Skin per nursing documentation: no pressure injuries   Edema: none at this time     Estimated Nutrition Needs:   Using dosing weight 78.5 kg  Energy Needs (25-30 kcal/kg): 7263-8199 kcal/day  Protein Needs (1.2-1.4 g/kg):     Previous Nutrition Diagnosis: Severe chronic malnutrition  Nutrition diagnosis is ongoing & addressed with tube feeds as tolerated    No new nutrition diagnosis at this time    Recommendations:    1. Recommend continue current EN regimen via post-pyloric tube, increasing feeds as tolerated to Vital 1.5 goal rate of 65ml/hr x 24hrs. At goal to provide 1560ml formula, 2340kcals, 105g protein, 1192ml free H2O (meets ~30kcals/kg & 1.33g protein/kg based on dosing wt 78.5kg).  2. Continue micronutrient supplementation as ordered  3. RD to remain available to adjust EN formulary, volume/rate PRN.     Monitoring and Evaluation:   Continue to monitor Nutritional intake, Tolerance to diet prescription, weights, labs, skin integrity  RD remains available upon request and will follow up per protocol     Wendy Travis, MS, RD, CDN, [facility]  pager #[phone] Nutrition Follow Up Note  Patient seen for: Malnutrition follow-up    Chart reviewed, events noted. 66M, PMH ACC/AHA stage D HF 2/2 NICM s/p HM2 LVAD (2017). Here initially for GIB prolonged hospital course, s/p tracheostomy, acalculous cholecystitis s/p perc dawn. Patient c persistent intermittent abdominal pain, per team, possible mesenteric ischemia from possible SMA stenosis; Underwent mesenteric angiogram on 9/10, found with SMA stenosis, however unable to place stent. CTA Abd/Pelvis () reveals L RP hematoma. Tolerating TC     Source: EMR, Team/HF Rounds    Diet, NPO with Tube Feed:   Tube Feeding Modality: Nasogastric  Vital 1.5 Tank (VITAL1.5RTH)  Total Volume for 24 Hours (mL): 1560  Continuous  Starting Tube Feed Rate {mL per Hour}: 10  Increase Tube Feed Rate by (mL): 5     Every 24 hours  Until Goal Tube Feed Rate (mL per Hour): 65  Tube Feed Duration (in Hours): 24  Tube Feed Start Time: 11:45  Supplement Feeding Modality:  Nasogastric  Probiotic Yogurt/Smoothie Cans or Servings Per Day:  2       Frequency:  Daily (09-10-21 @ 21:18)    Current EN regimen provides: 1560ml formula, 2340kcals, 105g protein, 1192ml free H2O    EN provisions per chart:  Currently infusing @50ml/hr - slowly advancing to goal per team  () 67% EN goal volume achieved - rate increased to 50ml/hr @ 12:00  () 65% EN goal volume achieved - rate increased to 45ml/hr @ 13:00  () 47% EN goal volume achieved - EN held @ 14:00; restarted at 18:00 at 40ml/hr   () 22% EN goal volume achieved - EN restarted @ 13:00 at 25ml/hr; reached 35ml/hr @ 19:00  (9/10) 0% EN goal volume achieved - NPO  - Pt has received 40% EN provisions in the last 5 days      Nutrition-related concerns:  - EN held for 2 hrs overnight 2/2 small volume emesis; Per Team NGT to be advanced post-pyloric today  - Pt previously receiving TwoCal; discussion with Team on  to switch to Vital 1.5 s/p episode of emesis x2  - Biliary drain in place  - Reglan & Remeron ordered daily  - Multivitamin and thiamin supplementation ordered daily  - NPH & Insulin Sliding Scale ordered to optimize BG; noted to be receiving steroids which should be completed today (9/15)    GI:  Last BM 9/15.   Bowel Regimen? [] Yes   [x] No  - Zofran ordered PRN    Weight in k.7 (09-15), 60.5 (), 61.9 (), 66.5 (), 59.7 (09-10), 63.5 (), 61.4 (), 60.4 (), 59 (), 58.6 (), 59 (), 58.4 (), 63 (), 66.2 (), 67.7 (), 71.2 ()    Weight history:   - Admission weight: 176.3 pounds / 80 kg (6/15)  significant wt loss noted; wt trending up    MEDICATIONS  (STANDING):  cefepime   IVPB  cefepime   IVPB  dextrose 40% Gel  dextrose 50% Injectable  dextrose 50% Injectable  glucagon  Injectable  hydrocortisone sodium succinate Injectable  insulin lispro (ADMELOG) corrective regimen sliding scale  methimazole  multivitamin  pantoprazole  Injectable  propranolol  sodium chloride 0.9%.  thiamine  vancomycin    Solution  vancomycin  IVPB  vancomycin  IVPB    Pertinent Labs: 09-15 @ 00:36: Na 132<L>, BUN 11, Cr 0.45<L>, <H>, K+ 4.1, Phos 3.5, Mg 1.8, Alk Phos 258<H>, ALT/SGPT 52<H>, AST/SGOT 23, HbA1c --    A1C with Estimated Average Glucose Result: 5.4 % (08-15-21 @ 13:52)  A1C with Estimated Average Glucose Result: 5.6 % (06-15-21 @ 02:42)    Finger Sticks:  POCT Blood Glucose.: 142 mg/dL (09-15 @ 05:08)  POCT Blood Glucose.: 141 mg/dL ( @ 23:57)  POCT Blood Glucose.: 139 mg/dL ( @ 18:18)  POCT Blood Glucose.: 125 mg/dL ( @ 11:48)    Skin per nursing documentation: no pressure injuries   Edema: none at this time     Estimated Nutrition Needs:   Using dosing weight 78.5 kg  Energy Needs (25-30 kcal/kg): 5560-7961 kcal/day  Protein Needs (1.2-1.4 g/kg):     Previous Nutrition Diagnosis: Severe chronic malnutrition  Nutrition diagnosis is ongoing & addressed with tube feeds as tolerated    No new nutrition diagnosis at this time    Recommendations:    1. Recommend continue current EN regimen via post-pyloric tube, increasing feeds as tolerated to Vital 1.5 goal rate of 65ml/hr x 24hrs. At goal to provide 1560ml formula, 2340kcals, 105g protein, 1192ml free H2O (meets ~30kcals/kg & 1.33g protein/kg based on dosing wt 78.5kg).  2. Continue micronutrient supplementation as ordered  3. RD to remain available to adjust EN formulary, volume/rate PRN.     Monitoring and Evaluation:   Continue to monitor Nutritional intake, Tolerance to diet prescription, weights, labs, skin integrity  RD remains available upon request and will follow up per protocol     Wendy Travis, MS, RD, CDN, CNSC  pager #[phone]

## 2021-09-15 NOTE — PROGRESS NOTE ADULT - ASSESSMENT
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 coli UTIs, driveline drainage (1/7/21) and COVID-19 (back in April 2020)  He was recently seen in clinic where he complained of abdominal pain and dark stools w constipation back in May. He presents to [facility] ER today weakness and fatigue, moderate and + Black stools for three days, on coumadin secondary to warfarin use in the setting of an LVAD. Patient has required transfusions for GIB in the past. Mostly recently back in jan 2021 pt had anemia with dark stools. No interventions was done at that time. However Last Endoscopy was done in July 2020 (negative). Today labs show patient is anemic with H/H of 4.5/16.3,. INR is 8.84 MAP in the 90s,   Returned from endoscopy appearing ill tachypneic with chills with new white out of his left lung      A/p  s/p LVAD placement  admission prolonged following GI bleeding, aspiration event, CDI, VAP, chronic abdominal pain, most recently with retroperitoneal bleeding post attempted stent placement  low grade fevers and leukocytosis- possibly related to retroperitoneal bleeding vs infected hematoma vs alternative source   blood cultures x2 sets are NGTD  CXR NAD  UA negative    monitor clinical signs/sxs     Continue  Vanco 500mg q12hr and Cefepime 1 gm q 8hr- pending final blood culture results and change in left flank hematoma    Discussed with CTU    Morris Prater MD  [phone]  After 5pm/weekends [phone]          Morris Prater MD  [phone]  After 5pm/weekends [phone]

## 2021-09-15 NOTE — PROGRESS NOTE ADULT - SUBJECTIVE AND OBJECTIVE BOX
Subjective: Patient seen and examined resting in bed. ON was noted to be febrile with Tmax 101.7. New cultures were sent     Medications:  cefepime   IVPB 1000 milliGRAM(s) IV Intermittent every 8 hours  cefepime   IVPB      chlorhexidine 2% Cloths 1 Application(s) Topical <User Schedule>  dextrose 40% Gel 15 Gram(s) Oral once  dextrose 50% Injectable 25 Gram(s) IV Push once  dextrose 50% Injectable 12.5 Gram(s) IV Push once  glucagon  Injectable 1 milliGRAM(s) IntraMuscular once  hydrocortisone sodium succinate Injectable 25 milliGRAM(s) IV Push <User Schedule>  insulin lispro (ADMELOG) corrective regimen sliding scale   SubCutaneous every 6 hours  lidocaine   4% Patch 1 Patch Transdermal daily  methimazole 15 milliGRAM(s) Oral <User Schedule>  metoclopramide Injectable 10 milliGRAM(s) IV Push every 8 hours  mirtazapine 7.5 milliGRAM(s) Oral daily  multivitamin 1 Tablet(s) Oral daily  ondansetron Injectable 4 milliGRAM(s) IV Push every 6 hours PRN  pantoprazole  Injectable 40 milliGRAM(s) IV Push every 12 hours  propranolol 40 milliGRAM(s) Oral every 8 hours  sertraline 100 milliGRAM(s) Oral daily  simethicone 80 milliGRAM(s) Chew every 8 hours PRN  sodium chloride 0.9% lock flush 10 milliLiter(s) IV Push every 1 hour PRN  sodium chloride 0.9%. 1000 milliLiter(s) IV Continuous <Continuous>  thiamine 100 milliGRAM(s) Oral daily  vancomycin    Solution 125 milliGRAM(s) Oral every 6 hours  vancomycin  IVPB      vancomycin  IVPB 500 milliGRAM(s) IV Intermittent every 12 hours    ICU Vital Signs Last 24 Hrs  T(C): 36.4 (15 Sep 2021 08:00), Max: 38.7 (15 Sep 2021 04:00)  T(F): 97.6 (15 Sep 2021 08:00), Max: 101.7 (15 Sep 2021 04:00)  HR: 89 (15 Sep 2021 11:00) (82 - 103)  BP: 104/68 (14 Sep 2021 18:00) (104/68 - 104/68)  BP(mean): 81 (14 Sep 2021 18:00) (81 - 81)  ABP: 97/52 (15 Sep 2021 11:00) (79/60 - 121/70)  ABP(mean): 68 (15 Sep 2021 11:00) (66 - 88)  RR: 20 (15 Sep 2021 11:00) (18 - 49)  SpO2: 100% (15 Sep 2021 11:00) (95% - 100%)      Weight in k.7 (09-15 @ 00:00)    I&O's Summary    14 Sep 2021 07:01  -  15 Sep 2021 07:00  --------------------------------------------------------  IN: 2120 mL / OUT: 1365 mL / NET: 755 mL    15 Sep 2021 07:01  -  15 Sep 2021 11:44  --------------------------------------------------------  IN: 300 mL / OUT: 0 mL / NET: 300 mL        Physical Exam  Appearance: No Acute Distress, thin-appearing  HEENT: JVP non elevated  Cardiovascular: VAD hum  Respiratory: Clear to auscultation bilaterally  Gastrointestinal: mildly distended, tenderness in LLQ  Skin: no skin lesions  Neurologic: Non-focal  Extremities: No LE edema, warm and well perfused  Psychiatry: Alert  Lines/drains: perc dawn drain in place, brownish contents, no bloody contents      LVAD Interrogation  VAD TYPE HM 2  Speed 9200  Flow 5.7  Power 5.6  PI  5.4  Assessment of driveline exit site: driveline dressing c/d/i  Programming changes: no changes made    Labs:                        9.9    22.98 )-----------( 168      ( 15 Sep 2021 00:36 )             31.0     09-15    132<L>  |  96  |  11  ----------------------------<  137<H>  4.1   |  25  |  0.45<L>    Ca    9.6      15 Sep 2021 00:36  Phos  3.5     09-15  Mg     1.8     09-15    TPro  7.0  /  Alb  3.0<L>  /  TBili  2.4<H>  /  DBili  x   /  AST  23  /  ALT  52<H>  /  AlkPhos  258<H>  09-15              Lactate Dehydrogenase, Serum: 259 U/L (09-15 @ 00:36)  Lactate Dehydrogenase, Serum: 211 U/L ( @ 01:03)

## 2021-09-16 NOTE — PROGRESS NOTE ADULT - ASSESSMENT
65 YO M with a history of stage D NICM s/p HM2 on 9/2017 as DT (due to severe PAD) with TV ring, prior COVID-19 infection 4/2020, recurrent syncope post LVAD s/p ILR, and chronic abdominal pain with prior negative workup who was admitted 6/14/21 with symptomatic anemia with Hgb 4.5 in setting of INR 8.8 without hemodynamic instability and has since had a protracted hospitalization. He was transfused and underwent VCE which showed active bleeding in the mid small bowel but subsequent enteroscopy 6/15 did not reveal any active bleeding. He acutely decompensated after procedure with fever/hypertension, low flow alarms, and pulmonary infiltrate with hypoxia requiring intubation from probable aspiration PNA. He was unable to extubated and has since undergone tracheostomy but tolerating persistent trach collar and nearing ability for decannulation. 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 Short term goal is maintenance of adequate nutrition and eventual trach decannulation.     Underwent mesenteric angiogram on 9/10 with vascular surgery. Found to have SMA stenosis at its origin but unable to place stent. Intra-op received 3uPRBC and required fem stop placement for L pseudoaneurysm. Post-op continued to have drop in H/H requiring additional 2u PRBC (last on 9/12), CT angio revealed left-sided retroperitoneal hematoma. He remains febrile (Tmax currently 101) and has persistent leukocytosis. All cultures have remained NGTD, all lines were exchanged and patient was remains on broad spectrum abx.

## 2021-09-16 NOTE — PROGRESS NOTE ADULT - SUBJECTIVE AND OBJECTIVE BOX
RICKY HAMPTON  MRN-[id number]  Patient is a 66y old  Male who presents with a chief complaint of Anemia, Supratherapeutic INR, Dark Stools (16 Sep 2021 10:37)    HPI:  65M PMH ACC/AHA stage D HF due to NICM HM2 LVAD , TV annuloplasty ring 17 as destination therapy due to severe peripheral artery disease with significant stenosis  SIADH, Depression, CKD-3 with hyperkalemia, past E. coli UTIs, driveline drainage (21) and COVID-19 (back in 2020)  He was recently seen in clinic where he complained of abdominal pain and dark stools w constipation back in May. He presents to [facility] ER today weakness and fatigue, moderate and + Black stools for three days, on coumadin secondary to warfarin use in the setting of an LVAD. Patient has required transfusions for GIB in the past. Mostly recently back in 2021 pt had anemia with dark stools. No interventions was done at that time. However Last Endoscopy was done in 2020 (negative). Today labs show patient is anemic with H/H of 4.5/16.3,. INR is 8.84 MAP in the 90s, Temp 35.1. He denies any chest pain, shortness of breath, dizziness, abd pain, nausea or vomiting.       (2021 16:57)      Surgery/Hospital Course:   admit for melena w/ anemia, INR 8.84   6/15 Capsul study (+) for small bowel bleed, balloon endoscopy (old blood in prox ileum); post EGD - septic w/ L opacity, re-intubated for concern for aspiration, TTE (Mod MR, decrease biV w/ interventricular septum boweing towards R)   bronch    +C Diff    CT C/A/P: Fluid filled colon which may be 2/2 rapid transit. Small bilateral pleffs with associates. Compressive atelectasis New ISABELLE & LLL  parenchymal opacities, suspicious for pneumonia. 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    #8 Shiley trach at bedside    LVAD speed increased to 9200   Bronch   TC since . Patient transferred to SDU.    INR today 2.64.  H&H 7.3/24 this AM.  Will repeat CBC at noon, and will send stool guaiac Patient with persistent abdominal tenderness, rate of tube feeds decreased.  No nausea/vomiting.     INR today 2.4. H&H 9.1/28.6 low flow overnight /N&V, refusing Tube feeds on D5 1/2 normal  @50 cc/hr   INR 2.69  H&H 7.7/.1 refusing Tube feeds on D5 1/2 normal  @50 cc/hr. This am + BM Melena Dr Oneill HF  aware- PRBC x1  GI team consulted -  NPO  plan on study in am-  D/w Dr Cadet Patient  to return to CTU for further management; 1PRBCS    Post op INR 2.2 today.  No bleeding. BC + for SM.  Pt is hypotensive requiring pressor and inotropic support.  ID follow up today on Cefepime will follow.   R PTC for PTX    CT C/A/P: sub q emphysema in R chest wall, GGO RUL, small ascites CTH negative; Abd US: GB thickening, pericholecystic fluid     Perchole drain in place continues to drain total output overnight 133.  Fever today 38.8    duplex LE negative    Patient with persistent abdominal pain, refusing tube feeds and medications, Psych consulted   CTA A/P ordered to r/o mesenteric ischemia 2/2 persistent anorexia, nausea, vomiting. Revealed:  Evaluation of the mesenteric vessels is limited by streak artifact from LVAD. There appears to be severe stenosis of the proximal SMA; abdominal mesenteric doppler is recommended for further evaluation. 2.  No small bowel findings to suggest acute mesenteric ischemia. 3.  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  8/15: Cultures resulted BC 1/2 +GPC in clusters, SC enterobacter; mesenteric duplex: borderline stenosis of proximal SMA  : CT C/A/P noncon: Nondular opacities in R lung apex w/cavitation, abd nl  :  Continue current care, treatment of thyrotoxicosis with medications as per endocrine, d/c ABX as per team.    RUQ sono: Contracted gallbladder with cholecystostomy tube in place.  9/10 failed SMA stent, L fem PSA, s/p 3U PRCB   CTA Abd/Pelvis L RP hematoma     Today:    REVIEW OF SYSTEMS:  Patient speaks over trach   Gen: No fever  EYES/ENT: No visual changes;  No vertigo or throat pain   NECK: No pain   RES:  No shortness of breath or Cough  CARD: No chest pain   GI: No abdominal pain  : No dysuria  NEURO: No weakness  SKIN: No itching, rashes     ICU Vital Signs Last 24 Hrs  T(C): 37.3 (16 Sep 2021 08:00), Max: 38.3 (16 Sep 2021 06:00)  T(F): 99.2 (16 Sep 2021 08:00), Max: 101 (16 Sep 2021 06:00)  HR: 112 (16 Sep 2021 10:00) (84 - 112)  BP: --  BP(mean): --  ABP: 122/72 (16 Sep 2021 10:00) (75/58 - 144/96)  ABP(mean): 91 (16 Sep 2021 10:00) (65 - 114)  RR: 22 (16 Sep 2021 10:00) (18 - 74)  SpO2: 99% (16 Sep 2021 10:00) (91% - 100%)      Physical Exam:  Gen:  Awake and alert, Sitting in chair in NAD.  Psych: Mood greatly improved but waxes and wanes, more interactive and participating in care. Would like to be more included in bedside rounding.  CNS:  intact, nonfocal, responds to all commands  Neck: no JVD, +TC   RES : course breath sounds, no wheezing, moderate tan secretions able to cough most up on own         CVS: +LVAD hum   Abd: Soft, RUQ tenderness by perc sybil site. Sybil drain with bilious fluid. Positive BS throughout.  Skin: No rash  Ext:  no edema    ============================I/O===========================   I&O's Detail    15 Sep 2021 07:01  -  16 Sep 2021 07:00  --------------------------------------------------------  IN:    Enteral Tube Flush: 25 mL    IV PiggyBack: 200 mL    Miscellaneous Tube Feedin mL    sodium chloride 0.9%: 240 mL  Total IN: 1595 mL    OUT:    Drain (mL): 190 mL    Emesis (mL): 300 mL    Voided (mL): 1150 mL  Total OUT: 1640 mL    Total NET: -45 mL      16 Sep 2021 07:01  -  16 Sep 2021 10:41  --------------------------------------------------------  IN:    IV PiggyBack: 100 mL  Total IN: 100 mL    OUT:    Miscellaneous Tube Feedin mL  Total OUT: 0 mL    Total NET: 100 mL        ============================ LABS =========================                        9.6    20.02 )-----------( 198      ( 16 Sep 2021 01:02 )             30.3     -    131<L>  |  96  |  13  ----------------------------<  178<H>  4.3   |  25  |  0.40<L>    Ca    9.4      16 Sep 2021 01:02  Phos  2.8       Mg     1.7         TPro  7.0  /  Alb  2.7<L>  /  TBili  2.0<H>  /  DBili  x   /  AST  16  /  ALT  35  /  AlkPhos  242<H>      LIVER FUNCTIONS - ( 16 Sep 2021 01:02 )  Alb: 2.7 g/dL / Pro: 7.0 g/dL / ALK PHOS: 242 U/L / ALT: 35 U/L / AST: 16 U/L / GGT: x             ABG - ( 16 Sep 2021 00:35 )  pH, Arterial: 7.41  pH, Blood: x     /  pCO2: 46    /  pO2: 117   / HCO3: 29    / Base Excess: 4.0   /  SaO2: 99.9              Urinalysis Basic - ( 14 Sep 2021 13:47 )    Color: Dark Yellow / Appearance: Slightly Turbid / S.025 / pH: x  Gluc: x / Ketone: Negative  / Bili: Negative / Urobili: Negative   Blood: x / Protein: 30 mg/dL / Nitrite: Negative   Leuk Esterase: Negative / RBC: 3 /hpf / WBC 1 /HPF   Sq Epi: x / Non Sq Epi: 1 /hpf / Bacteria: Negative      ======================Micro/Rad/Cardio=================  Culture: Reviewed   CXR: Reviewed  Echo:Reviewed  ======================================================  PAST MEDICAL & SURGICAL HISTORY:  CHF (congestive heart failure)    CAD (coronary artery disease)    Depression    Pleural effusion    History of 2019 novel coronavirus disease (COVID-19)  2020    Hemorrhoids    Bleeding hemorrhoids    Peripheral arterial disease    Claudication    BPH with urinary obstruction    ACC/AHA stage D systolic heart failure    Anticoagulation goal of INR 2.0 to 2.5    Falls    Clavicle fracture    CKD (chronic kidney disease), stage III    Iron deficiency anemia    H/O epistaxis    Vertigo    GI bleed    S/P TVR (tricuspid valve repair)    S/P ventricular assist device    S/P endoscopy      ====================ASSESSMENT ==============  Stage D Nonischemic Cardiomyopathy, Status Post HM2 on 2017    Cardiogenic shock  Hemodynamic instability   Acute hypoxemic respiratory failure s/p trach   GI bleed , Status Post Enteroscopy   Anemia, in setting of melena   Chronic Kidney Disease  Stress hyperglycemia   C.diff positive on    Hypovolemic shock  Septic shock  Leukocytosis  GB thickening/percholecystic s/p perc choley by IT   SMA stenosis  Serratia/citrobacter pneumonia   Stenotrophomonas pneumonia   Enterobacter pneumonia   Nasuea/vomiting  Deconditioning    Plan:  ====================== NEUROLOGY=====================  C/w mirtazapine and sertraline for depression  Deconditioned, PT/Ambulate as tolerated  Tylenol PRN for analgesia     acetaminophen   Tablet .. 650 milliGRAM(s) Oral every 6 hours PRN Mild Pain (1 - 3)  mirtazapine 7.5 milliGRAM(s) Oral daily  sertraline 100 milliGRAM(s) Oral daily    ==================== RESPIRATORY======================  Acute hypoxemic respiratory failure s/p #8 shiley trach on , continue TC as tolerated (TC since )  Continue close monitoring of respiratory rate and breathing pattern, following of ABG’s with A-line monitoring, continuous pulse oximetry monitoring.   Moderate secretions, continue suctioning prn, pulmonary toileting.     ====================CARDIOVASCULAR==================  Stage D Nonischemic Cardiomyopathy, Status Post HM2 on 2017; LVAD settings 9200, flow 6.2   TTE : reveals at least moderate MR, mild AI, severe global LV syst dysfxn, dec RV fxn   Propranolol for rate control of HR 2/2 Hyperthyroidism, titrate as tolerated per Endo  Continue invasive hemodynamic monitoring     propranolol 40 milliGRAM(s) Oral every 8 hours    ===================HEMATOLOGIC/ONC ===================  CTA A/P on  +L RP hematoma   Acute blood loss anemia, monitor H&H/Plts    Holding coumadin and ASA given recurrent GI bleeding; LDH up trending 259->314, will continue to monitor closely.     ===================== RENAL =========================  Continue monitoring urine output, I&OS, BUN/Cr   Euvolemic, even fluid balance, currently off diuretics.    Replete lytes PRN. Keep K> 4 and Mg >2     ==================== GASTROINTESTINAL===================  S/p cholecystostomy tube placed on  with IR   CTA A/P : Evaluation of the mesenteric vessels is limited by streak artifact from LVAD. There appears to be severe stenosis of the proximal SMA; abdominal mesenteric doppler is recommended for further evaluation. 2.  No small bowel findings to suggest acute mesenteric ischemia. 3.  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  Mesenteric duplex on 8/15 borderline stenosis of proximal SMA, s/p failed SMA stent, L fem RP hematoma on 9/10, s/p 3U PRBC.   Plan per Vascular surgeon Dr. Bran to re-attempt SMA stent by brachial cutdown when patient deemed stable to do so by Heart Failure team.   Tolerating tube feeds, Vital 1.5 shantelle, continue increasing 5cc/day for goal of 65cc/hr, plan to advance NG tube today to attempt to get post-pyloric  CTA A/P on  +L RP hematoma   Reglan for gut motility  Zofran PRN nausea/vomiting    multivitamin 1 Tablet(s) Oral daily  GI prophylaxis, pantoprazole  Injectable 40 milliGRAM(s) IV Push every 12 hours  simethicone 80 milliGRAM(s) Chew every 8 hours PRN Gas  sodium chloride 0.9% lock flush 10 milliLiter(s) IV Push every 1 hour PRN Pre/post blood products, medications, blood draw, and to maintain line patency  sodium chloride 0.9%. 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 - Free T4 remains elevated   Per endocrine      - Thyroid US when trach is out      - Propanolol as above for optimal T4-->T3 conversion      - c/w Methimazole ---> consider changing to rectal if continues to have emesis for better absorption      - s/p hydrocortisone     dextrose 40% Gel 15 Gram(s) Oral once  dextrose 50% Injectable 25 Gram(s) IV Push once  dextrose 50% Injectable 12.5 Gram(s) IV Push once  glucagon  Injectable 1 milliGRAM(s) IntraMuscular once  insulin lispro (ADMELOG) corrective regimen sliding scale   SubCutaneous every 6 hours  methimazole 15 milliGRAM(s) Oral <User Schedule>    ========================INFECTIOUS DISEASE================  Temp 99.2F, WBC downtrending 22.98->20.02   Continue trending WBC and monitoring fever curve   SCx : +Moderate Serratia marcescens, BCx on 9/15 BGTD   Empiric coverage with Meropenem and Vancomycin     meropenem  IVPB 1000 milliGRAM(s) IV Intermittent every 8 hours  vancomycin  IVPB 500 milliGRAM(s) IV Intermittent every 12 hours    C.diff prophylaxis, vancomycin    Solution 125 milliGRAM(s) Oral every 6 hours          Patient requires continuous monitoring with bedside rhythm monitoring, pulse ox monitoring, and intermittent blood gas analysis. Care plan discussed with ICU care team. Patient remained critical and at risk for life threatening decompensation.     By signing my name below, I, Agnieszka Cherry, attest that this documentation has been prepared under the direction and in the presence of CLAUDIA Rossi   Electronically signed: Cesia Shaffer, 21 @ 10:41    I, Maryann Cullen, personally performed the services described in this documentation. all medical record entries made by the luisibmel were at my direction and in my presence. I have reviewed the chart and agree that the record reflects my personal performance and is accurate and complete  Electronically signed: CLAUDIA Rossi        RICKY HAMPTON  MRN-[id number]  Patient is a 66y old  Male who presents with a chief complaint of Anemia, Supratherapeutic INR, Dark Stools (16 Sep 2021 10:37)    HPI:  65M PMH ACC/AHA stage D HF due to NICM HM2 LVAD , TV annuloplasty ring 17 as destination therapy due to severe peripheral artery disease with significant stenosis  SIADH, Depression, CKD-3 with hyperkalemia, past E. coli UTIs, driveline drainage (21) and COVID-19 (back in 2020)  He was recently seen in clinic where he complained of abdominal pain and dark stools w constipation back in May. He presents to [facility] ER today weakness and fatigue, moderate and + Black stools for three days, on coumadin secondary to warfarin use in the setting of an LVAD. Patient has required transfusions for GIB in the past. Mostly recently back in 2021 pt had anemia with dark stools. No interventions was done at that time. However Last Endoscopy was done in 2020 (negative). Today labs show patient is anemic with H/H of 4.5/16.3,. INR is 8.84 MAP in the 90s, Temp 35.1. He denies any chest pain, shortness of breath, dizziness, abd pain, nausea or vomiting.       (2021 16:57)      Surgery/Hospital Course:   admit for melena w/ anemia, INR 8.84   6/15 Capsul study (+) for small bowel bleed, balloon endoscopy (old blood in prox ileum); post EGD - septic w/ L opacity, re-intubated for concern for aspiration, TTE (Mod MR, decrease biV w/ interventricular septum boweing towards R)   bronch    +C Diff    CT C/A/P: Fluid filled colon which may be 2/2 rapid transit. Small bilateral pleffs with associates. Compressive atelectasis New ISABELLE & LLL  parenchymal opacities, suspicious for pneumonia. 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    #8 Shiley trach at bedside    LVAD speed increased to 9200   Bronch   TC since . Patient transferred to SDU.    INR today 2.64.  H&H 7.3/24 this AM.  Will repeat CBC at noon, and will send stool guaiac Patient with persistent abdominal tenderness, rate of tube feeds decreased.  No nausea/vomiting.     INR today 2.4. H&H 9.1/28.6 low flow overnight /N&V, refusing Tube feeds on D5 1/2 normal  @50 cc/hr   INR 2.69  H&H 7.7/.1 refusing Tube feeds on D5 1/2 normal  @50 cc/hr. This am + BM Melena Dr Oneill HF  aware- PRBC x1  GI team consulted -  NPO  plan on study in am-  D/w Dr Cadet Patient  to return to CTU for further management; 1PRBCS    Post op INR 2.2 today.  No bleeding. BC + for SM.  Pt is hypotensive requiring pressor and inotropic support.  ID follow up today on Cefepime will follow.   R PTC for PTX    CT C/A/P: sub q emphysema in R chest wall, GGO RUL, small ascites CTH negative; Abd US: GB thickening, pericholecystic fluid     Perchole drain in place continues to drain total output overnight 133.  Fever today 38.8    duplex LE negative    Patient with persistent abdominal pain, refusing tube feeds and medications, Psych consulted   CTA A/P ordered to r/o mesenteric ischemia 2/2 persistent anorexia, nausea, vomiting. Revealed:  Evaluation of the mesenteric vessels is limited by streak artifact from LVAD. There appears to be severe stenosis of the proximal SMA; abdominal mesenteric doppler is recommended for further evaluation. 2.  No small bowel findings to suggest acute mesenteric ischemia. 3.  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  8/15: Cultures resulted BC 1/2 +GPC in clusters, SC enterobacter; mesenteric duplex: borderline stenosis of proximal SMA  : CT C/A/P noncon: Nondular opacities in R lung apex w/cavitation, abd nl  :  Continue current care, treatment of thyrotoxicosis with medications as per endocrine, d/c ABX as per team.    RUQ sono: Contracted gallbladder with cholecystostomy tube in place.  9/10 failed SMA stent, L fem PSA, s/p 3U PRCB   CTA Abd/Pelvis L RP hematoma     Today:    REVIEW OF SYSTEMS:  Gen: No fever  EYES/ENT: No visual changes;  No vertigo or throat pain   NECK: No pain   RES:  No shortness of breath or Cough  CARD: No chest pain   GI: No abdominal pain  : No dysuria  NEURO: No weakness  SKIN: No itching, rashes     ICU Vital Signs Last 24 Hrs  T(C): 37.3 (16 Sep 2021 08:00), Max: 38.3 (16 Sep 2021 06:00)  T(F): 99.2 (16 Sep 2021 08:00), Max: 101 (16 Sep 2021 06:00)  HR: 112 (16 Sep 2021 10:00) (84 - 112)  ABP: 122/72 (16 Sep 2021 10:00) (75/58 - 144/96)  ABP(mean): 91 (16 Sep 2021 10:00) (65 - 114)  RR: 22 (16 Sep 2021 10:00) (18 - 74)  SpO2: 99% (16 Sep 2021 10:00) (91% - 100%)      Physical Exam:  Gen:  Awake and alert, Sitting in chair in NAD.  Psych: Mood greatly improved but waxes and wanes, more interactive and participating in care. Would like to be more included in bedside rounding.  CNS:  intact, nonfocal, responds to all commands  Neck: no JVD, +TC   RES : course breath sounds, no wheezing, moderate tan secretions able to cough most up on own         CVS: +LVAD hum   Abd: Soft, RUQ tenderness by perc sybil site. Sybil drain with bilious fluid. Positive BS throughout.  Skin: No rash  Ext:  no edema    ============================I/O===========================   I&O's Detail    15 Sep 2021 07:01  -  16 Sep 2021 07:00  --------------------------------------------------------  IN:    Enteral Tube Flush: 25 mL    IV PiggyBack: 200 mL    Miscellaneous Tube Feedin mL    sodium chloride 0.9%: 240 mL  Total IN: 1595 mL    OUT:    Drain (mL): 190 mL    Emesis (mL): 300 mL    Voided (mL): 1150 mL  Total OUT: 1640 mL    Total NET: -45 mL      16 Sep 2021 07:01  -  16 Sep 2021 10:41  --------------------------------------------------------  IN:    IV PiggyBack: 100 mL  Total IN: 100 mL    OUT:    Miscellaneous Tube Feedin mL  Total OUT: 0 mL    Total NET: 100 mL        ============================ LABS =========================                        9.6    20. )-----------( 198      ( 16 Sep 2021 01:02 )             30.3     09-16    131<L>  |  96  |  13  ----------------------------<  178<H>  4.3   |  25  |  0.40<L>    Ca    9.4      16 Sep 2021 01:02  Phos  2.8     -  Mg     1.7     -    TPro  7.0  /  Alb  2.7<L>  /  TBili  2.0<H>  /  DBili  x   /  AST  16  /  ALT  35  /  AlkPhos  242<H>      LIVER FUNCTIONS - ( 16 Sep 2021 01:02 )  Alb: 2.7 g/dL / Pro: 7.0 g/dL / ALK PHOS: 242 U/L / ALT: 35 U/L / AST: 16 U/L / GGT: x             ABG - ( 16 Sep 2021 00:35 )  pH, Arterial: 7.41  pH, Blood: x     /  pCO2: 46    /  pO2: 117   / HCO3: 29    / Base Excess: 4.0   /  SaO2: 99.9              Urinalysis Basic - ( 14 Sep 2021 13:47 )    Color: Dark Yellow / Appearance: Slightly Turbid / S.025 / pH: x  Gluc: x / Ketone: Negative  / Bili: Negative / Urobili: Negative   Blood: x / Protein: 30 mg/dL / Nitrite: Negative   Leuk Esterase: Negative / RBC: 3 /hpf / WBC 1 /HPF   Sq Epi: x / Non Sq Epi: 1 /hpf / Bacteria: Negative      ======================Micro/Rad/Cardio=================  Culture: Reviewed   CXR: Reviewed  Echo:Reviewed  ======================================================  PAST MEDICAL & SURGICAL HISTORY:  CHF (congestive heart failure)    CAD (coronary artery disease)    Depression    Pleural effusion    History of  novel coronavirus disease (COVID-19)  2020    Hemorrhoids    Bleeding hemorrhoids    Peripheral arterial disease    Claudication    BPH with urinary obstruction    ACC/AHA stage D systolic heart failure    Anticoagulation goal of INR 2.0 to 2.5    Falls    Clavicle fracture    CKD (chronic kidney disease), stage III    Iron deficiency anemia    H/O epistaxis    Vertigo    GI bleed    S/P TVR (tricuspid valve repair)    S/P ventricular assist device    S/P endoscopy      ====================ASSESSMENT ==============  Stage D Nonischemic Cardiomyopathy, Status Post HM2 on 2017    Cardiogenic shock  Hemodynamic instability   Acute hypoxemic respiratory failure s/p trach   GI bleed , Status Post Enteroscopy   Anemia, in setting of melena   Chronic Kidney Disease  Stress hyperglycemia   C.diff positive on    Hypovolemic shock  Septic shock  Leukocytosis  GB thickening/percholecystic s/p perc choley by IT   SMA stenosis  Serratia/citrobacter pneumonia   Stenotrophomonas pneumonia   Enterobacter pneumonia   Nasuea/vomiting  Deconditioning    Plan:  ====================== NEUROLOGY=====================  C/w mirtazapine and sertraline for depression  Deconditioned, PT/Ambulate as tolerated  Tylenol PRN for analgesia     acetaminophen   Tablet .. 650 milliGRAM(s) Oral every 6 hours PRN Mild Pain (1 - 3)  mirtazapine 7.5 milliGRAM(s) Oral daily  sertraline 100 milliGRAM(s) Oral daily    ==================== RESPIRATORY======================  Acute hypoxemic respiratory failure s/p #8 shiley trach on , continue TC as tolerated (TC since )  Continue close monitoring of respiratory rate and breathing pattern, following of ABG’s with A-line monitoring, continuous pulse oximetry monitoring.   Moderate secretions, continue suctioning prn, pulmonary toileting.     ====================CARDIOVASCULAR==================  Stage D Nonischemic Cardiomyopathy, Status Post HM2 on 2017; LVAD settings 9200, flow 6.2   TTE : reveals at least moderate MR, mild AI, severe global LV syst dysfxn, dec RV fxn   Propranolol for rate control of HR 2/2 Hyperthyroidism, titrate as tolerated per Endo  Continue invasive hemodynamic monitoring     propranolol 40 milliGRAM(s) Oral every 8 hours    ===================HEMATOLOGIC/ONC ===================  CTA A/P on  +L RP hematoma   Acute blood loss anemia, monitor H&H/Plts    Holding coumadin and ASA given recurrent GI bleeding    ===================== RENAL =========================  Continue monitoring urine output, I&OS, BUN/Cr   Euvolemic, even fluid balance, currently off diuretics.    Replete lytes PRN. Keep K> 4 and Mg >2     ==================== GASTROINTESTINAL===================  S/p cholecystostomy tube placed on  with IR   CTA A/P : Evaluation of the mesenteric vessels is limited by streak artifact from LVAD. There appears to be severe stenosis of the proximal SMA; abdominal mesenteric doppler is recommended for further evaluation. 2.  No small bowel findings to suggest acute mesenteric ischemia. 3.  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  Mesenteric duplex on 8/15 borderline stenosis of proximal SMA, s/p failed SMA stent, L fem RP hematoma on 9/10, s/p 3U PRBC.   Plan per Vascular surgeon Dr. Bran to re-attempt SMA stent by brachial cutdown when patient deemed stable to do so by Heart Failure team.   CTA A/P on  +L RP hematoma   Reglan for gut motility  Zofran PRN nausea/vomiting  Pt with multiple episodes of vomiting overnight. TF restarted today at 20cc/hr.  Pt would likely benefit from a PEG given persistent gastroparesis and TF intolerance.  However, pt does not wish to have PEG placed.      multivitamin 1 Tablet(s) Oral daily  GI prophylaxis, pantoprazole  Injectable 40 milliGRAM(s) IV Push every 12 hours  simethicone 80 milliGRAM(s) Chew every 8 hours PRN Gas  sodium chloride 0.9% lock flush 10 milliLiter(s) IV Push every 1 hour PRN Pre/post blood products, medications, blood draw, and to maintain line patency  sodium chloride 0.9%. 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 - Free T4 remains elevated   Per endocrine      - Thyroid US when trach is out      - Propanolol as above for optimal T4-->T3 conversion      - c/w Methimazole ---> consider changing to rectal if continues to have emesis for better absorption      - s/p hydrocortisone     dextrose 40% Gel 15 Gram(s) Oral once  dextrose 50% Injectable 25 Gram(s) IV Push once  dextrose 50% Injectable 12.5 Gram(s) IV Push once  glucagon  Injectable 1 milliGRAM(s) IntraMuscular once  insulin lispro (ADMELOG) corrective regimen sliding scale   SubCutaneous every 6 hours  methimazole 15 milliGRAM(s) Oral <User Schedule>    ========================INFECTIOUS DISEASE================  Tmax 101. Leukocytosis.  Continue trending WBC and monitoring fever curve   SCx : +Moderate Serratia marcescens, BCx on 9/15 BGTD   Empiric coverage broadened to Meropenem and Vancomycin     meropenem  IVPB 1000 milliGRAM(s) IV Intermittent every 8 hours  vancomycin  IVPB 500 milliGRAM(s) IV Intermittent every 12 hours    C.diff prophylaxis, vancomycin    Solution 125 milliGRAM(s) Oral every 6 hours          Patient requires continuous monitoring with bedside rhythm monitoring, pulse ox monitoring, and intermittent blood gas analysis. Care plan discussed with ICU care team. Patient remained critical and at risk for life threatening decompensation.     By signing my name below, IAgnieszka, attest that this documentation has been prepared under the direction and in the presence of CLAUDIA Rossi   Electronically signed: Romel Shaffer, 21 @ 10:41    I, Maryann Cullen, personally performed the services described in this documentation. all medical record entries made by the romel were at my direction and in my presence. I have reviewed the chart and agree that the record reflects my personal performance and is accurate and complete  Electronically signed: CLAUDIA Rossi

## 2021-09-16 NOTE — PROGRESS NOTE ADULT - PROBLEM SELECTOR PLAN 4
- serratia bacteremia and poss acalculous cholecystitis. B/c with gram + cocci and many enterobacter Carbapenem resistant strain and now s/p per dawn drain  - also with enterobacter from sputum culture 8/13  - continues to have worsening leukocytosis (currently 20.02) and remains febrile (tmax 101).   - ID following and appreciate recommendations   - currently on cefepime and Vacno IV  - Was PAN Cx 9/13 and 9/15, all cultures remain NGTD  - would recommend sending C.diff sample as patient is having loose BM - serratia bacteremia and poss acalculous cholecystitis. B/c with gram + cocci and many enterobacter Carbapenem resistant strain and now s/p per dawn drain  - also with enterobacter from sputum culture 8/13  - continues to have worsening leukocytosis (currently 20.02) and remains febrile (tmax 101).   - ID following and appreciate recommendations   - currently on cefepime and Vacno IV. Will also add Meropenum IV today   - Was PAN Cx 9/13 and 9/15, all cultures remain NGTD  - would recommend sending C.diff sample as patient is having loose BM

## 2021-09-16 NOTE — PROGRESS NOTE ADULT - PROBLEM SELECTOR PLAN 7
- endocrine recs appreciated  - currently on methimazole, propranolol  - Steroid taper completed  - Continue to monitor TFT post angiogram

## 2021-09-16 NOTE — PROGRESS NOTE ADULT - PROBLEM SELECTOR PLAN 2
- Continue current speed of 9200 RPM. Speed increased this admission due to signs of inadequately unloaded left ventricle  - MAP is at goal at this time  - Holding coumadin and aspirin indefinitely given recurrent GI bleeding.   - LDH level remains elevated, currently 314. likely elevated due to RP bleed. Remains on AC as stated above. Continue to monitor levels daily

## 2021-09-16 NOTE — PROGRESS NOTE ADULT - SUBJECTIVE AND OBJECTIVE BOX
RICKY JOINT  MRN#: [id number]  Subjective:  Pulmonary progress  : recurrent Acute hypoxic respiratory Failure ,aspiration pneumonia, NICM  , chart reviewed and H/O obtained radiological and Laboratory study reviewed patient Examined     65M PMH ACC/AHA stage D HF due to NICM HM2 LVAD , TV annuloplasty ring 17 as destination therapy due to severe peripheral artery disease with significant stenosis  SIADH, Depression, CKD-3 with hyperkalemia, past E. coli UTIs, driveline drainage (21) and COVID-19 (back in 2020)  He was recently seen in clinic where he complained of abdominal pain and dark stools w constipation back in May. He presents to [facility] ER today weakness and fatigue, moderate and + Black stools for three days, on coumadin secondary to warfarin use in the setting of an LVAD. Patient has required transfusions for GIB in the past. Mostly recently back in 2021 pt had anemia with dark stools. No interventions was done at that time. However Last Endoscopy was done in 2020 (negative). Today labs show patient is anemic with H/H of 4.5/16.3,. INR is 8.84 MAP in the 90s, Temp 35.1. He denies any chest pain, shortness of breath, dizziness, abd pain, nausea or vomiting. found to have  rectal bleeding underwent endoscopy ,old blood in the proximal ileum ,  develop sepsis with LL opacity given Antibiotics , Extubated , reintubated , Bronchoscopy on Zosyn for LL pneumonia  and Amiodarone S/P TV Annuloplasty , patient remain intubated on full ventilatory support .S/P multiple units of blood transfusion , remain on full ventilatory support on Precedex and propofol , new central IJ line , diarrhea C diff. +ve on po vancomycin and IV Flagyl,  mildly distended belly , fever start on cefepime 2gm q 8 hrs S/P tracheostomy .  new RT Subclavian central line continue on contact  isolation ,still diarrhea on C-diff antibiotics ENT follow up appreciated , trial of C-PAP as tolerated , , copious secretion from trach. site chest x ray left lower lobe pneumonia , tolerating trch. collar 50% FI02 still excessive secretion need pulmonary toilet , off Ancef antibiotic , no more diarrhea back on full support mechanical ventilator , chest x ray show improvement in LLL air space disease, more awake and responsive on tube feeding no more diarrhea , S/P  Ancef for bacteremia no fever ,ID follow up noted ,  no nausea or vomiting or diarrhea still very weak and tired , event note pulled NG tube now replaced , back on tube feeding ,still on po vancomycin , getting PT and OT at bed side , no plan for decannulation for now. , no more diarrhea receiving PT and OPT at bed side , minimal secretion from tracheostomy site , no SOB getting stronger , improve muscle tone patient transfer to monitor bed still on contact isolation for C-Difficel colitis on 50% FI02, NG tube clogged , and change to resume tube feeding still loose stool . H/H drop significantly require blood transfusion , most likely GI bleeding , IV heparin D/C , vomiting 200 cc of creamy color tube feeding on hold no sob, still melena monitor in the CTU possible capsule endoscopy , H/H is stable ., patient develop TR sided  pneumothorax require chest tube placement , RT IJ central line  placed , develop fever shaking chills , blood culture positive for serratia marcescens , start on cefepime .the patient  become hypoxic and hypotensive placed on full ventilatory support and Vasopressin , levophed and Dobutamine ,S/P blood transfusion on meropenem and vancomycin ,   , on and  off pressors , occasional agitation on Precedex .S/P IR cholecystostomy tube drainage placement in the RT upper Quadrant , resume anticoagulation chest x ray noted C-PAP trail lasted only for 2 hrs , new RT SC line and D/C RT IJ line , RT pig tail cathter has been removed , tolerating C-PAP trial placed on trach. collar 50% FI02 GI consultant noted on NG tube feeding as tolerated , develop AF RVR S/P  bolus Amiodarone  back to regular sinus Rhythm , Flat Affect depressed , back on tube feeding Vital AF at 60 cc/ hr .still intermittent abdominal pain , no fever saturation is accepted  back on full ventilatory support , tired and sleepy on round  .start  back on  tube feeding . tolerating it very well , no nausea or vomiting , good 02 saturation on trac-collar on methimazole for hyperthyroidism , no new C/O no plan for decannulation yet , electrolyte blood test is still pending .on respiratory isolation sputum culture is negative , increase WBC  improved on cefepime , sputum positive for enterococcus , condition is the same ,still C/O Abdominal pain , white count is improving , no chest pain or SOB ,  .placed on Reglan 10 mg TID for gastric motility , depressed , withdrawn. on full NG tube diet with Vital , secretion are much improved , went for mesenteric angiogram , unable to stent SMA S/P 3 units of blood transfusion on trach. collar 40% Fi02, anxious to eat discuss possible decannulation,  H/H  are stable vascular  note appreciated no mediate plan for repeat mesenteric angiogram remain on trach.-collar at 40% FI02, RT IJ in place reassessed for stent in the SMA by vascular, no plan for now depresses and withdrawn        (2021 16:57)    PAST MEDICAL & SURGICAL HISTORY:  CHF (congestive heart failure)    CAD (coronary artery disease)  Depression    Pleural effusion    History of 2019 novel coronavirus disease (COVID-19)  2020    Hemorrhoids    Bleeding hemorrhoids    Peripheral arterial disease    Claudication    BPH with urinary obstruction    ACC/AHA stage D systolic heart failure  Anticoagulation goal of INR 2.0 to 2.5    Falls    Clavicle fracture    CKD (chronic kidney disease), stage III    Iron deficiency anemia    H/O epistaxis    Vertigo    GI bleed    S/P TVR (tricuspid valve repair)    S/P ventricular assist device    S/P endoscopy    OBJECTIVE:  ICU Vital Signs Last 24 Hrs  T(C): 38.2 (2021 10:00), Max: 38.5 (2021 12:00)  T(F): 100.8 (2021 10:00), Max: 101.3 (2021 12:00)  HR: 65 (2021 10:00) (61 - 69)  BP: --  BP(mean): --  ABP: 105/67 (2021 10:00) (90/54 - 113/64)  ABP(mean): 77 (2021 10:00) (63 - 77)  RR: 20 (2021 10:00) (19 - 35)  SpO2: 99% (2021 10:00) (96% - 100%)       @ 07:01  -   @ 07:00  --------------------------------------------------------  IN: 2693.9 mL / OUT: 1415 mL / NET: 1278.9 mL     @ 07: @ 10:49  --------------------------------------------------------  IN: 420.8 mL / OUT: 115 mL / NET: 305.8 mL  PHYSICAL EXAM:Daily   Elderly male S/P tracheostomy on full ventilatory support  50% FI02 ,  Daily Weight in k.4 (2021 00:00)  HEENT:     + NCAT  + EOMI  - Conjuctival edema   - Icterus   - Thrush   - ETT  + NGT/OGT  Neck:         + FROM  RT IJ  line JVD  - Nodes - Masses + Mid-line trachea + Tracheostomy  Chest:            normal A-P diameter    Lungs:          + CTA   + Rhonchi    - Rales    - Wheezing + Decreased  LT BS   - Dullness R L  Cardiac:       + S1 + S2    + RRR   - Irregular   - S3  - S4    - Murmurs   - Rub   - Hamman’s sign   Abdomen:    + BS  + Soft + Non-tender - Distended - Organomegaly - PEG .cholecystostomy tube in place  Extremities:   - Cyanosis U/L   - Clubbing  U/L  + LE/UE Edema   + Capillary refill    + Pulses   Neuro:        - Awake   -  Alert   - Confused   - Lethargic   + Sedated  + Generalized Weakness  Skin:        - Rashes    - Erythema   + Normal incisions   + IV sites intact          HOSPITAL MEDICATIONS: All medications reviewed and analyzed  MEDICATIONS  (STANDING):  amiodarone    Tablet 200 milliGRAM(s) Oral daily  chlorhexidine 0.12% Liquid 15 milliLiter(s) Oral Mucosa every 12 hours  chlorhexidine 2% Cloths 1 Application(s) Topical <User Schedule>  dexmedetomidine Infusion 0.5 MICROgram(s)/kG/Hr (9.81 mL/Hr) IV Continuous <Continuous>  dextrose 50% Injectable 50 milliLiter(s) IV Push every 15 minutes  heparin  Infusion 400 Unit(s)/Hr (12.5 mL/Hr) IV Continuous <Continuous>  Hydromorphone  Injectable 0.5 milliGRAM(s) IV Push once  insulin lispro (ADMELOG) corrective regimen sliding scale   SubCutaneous every 6 hours  pantoprazole  Injectable 40 milliGRAM(s) IV Push every 12 hours  piperacillin/tazobactam IVPB.. 3.375 Gram(s) IV Intermittent every 8 hours  propofol Infusion 20 MICROgram(s)/kG/Min (9.42 mL/Hr) IV Continuous <Continuous>  sodium chloride 0.9% lock flush 3 milliLiter(s) IV Push every 8 hours  sodium chloride 0.9%. 1000 milliLiter(s) (10 mL/Hr) IV Continuous <Continuous>    MEDICATIONS  (PRN):  acetaminophen    Suspension .. 650 milliGRAM(s) Oral every 6 hours PRN Temp greater or equal to 38C (100.4F)    LABS: All Lab data reviewed and analyzed                          9.6    . )-----------( 198      ( 16 Sep 2021 01:02 )             30.3    -    131<L>  |  96  |  13  ----------------------------<  178<H>  4.3   |  25  |  0.40<L>    Ca    9.4      16 Sep 2021 01:02  Phos  2.8     -16  Mg     1.7     -    TPro  7.0  /  Alb  2.7<L>  /  TBili  2.0<H>  /  DBili  x   /  AST  16  /  ALT  35  /  AlkPhos  242<H>      Ca    9.6      15 Sep 2021 00:36  Phos  3.5     -15  Mg     1.8     09-15    TPro  7.0  /  Alb  3.0<L>  /  TBili  2.4<H>  /  DBili  x   /  AST  23  /  ALT  52<H>  /  AlkPhos  258<H>  09-15      Ca    8.5      14 Sep 2021 00:33  Phos  2.6     09-14  Mg     1.6     -    TPro  6.0  /  Alb  2.8<L>  /  TBili  1.2  /  DBili  x   /  AST  36  /  ALT  60<H>  /  AlkPhos  215<H>        Ca    9.2      13 Sep 2021 01:03  Phos  3.0     09-13  Mg     1.7     -    TPro  6.5  /  Alb  2.8<L>  /  TBili  0.6  /  DBili  x   /  AST  33  /  ALT  75<H>  /  AlkPhos  201<H>                                                                                                                                            PTT - ( 2021 04:52 )  PTT:45.2 sec LIVER FUNCTIONS - ( 2021 00:42 )  Alb: 3.4 g/dL / Pro: 6.7 g/dL / ALK PHOS: 213 U/L / ALT: 15 U/L / AST: 24 U/L / GGT: x           RADIOLOGY: - Reviewed and analyzed RT Pig tail cathter  , LVAD HM2, CT scan of abdomen reviewed result noted

## 2021-09-16 NOTE — PROGRESS NOTE ADULT - ASSESSMENT
Assessment and Recommendation:   · Assessment	  Assessment and recommendation :  Recurrent Acute hypoxic respiratory Failure S/P tracheostomy on full ventilatory support  50% FI02,   Acute blood loss anemia S/P multiple  blood transfusion   going for mesenteric angiogram  S/P septic shock off vasopressin , levophed and off  Dobutamine   S/P cholecystostomy tube placement by IR    AF RVR back to regular sinus Rhythm   Non ischemic cardiomyopathy continue ACE inhibitor and B-Blockers   mesenteric ischemia failure to stent SMA , no plan for repeated trial   S/P 3 unit of blood transfusion   S/P Septic shock and cardiogenic shock   Stage D systolic heart failure S/P LVAD HM2   MH2 LVAD  with  TV Annuloplasty  Severe peripheral vascular disease   severe hyperglycemia on insulin coverage    Reglan 10 mg for Gastric Motility   hyperthyroidism on Methimazole 10mg TID   critical care polyneuropathy   Anemia of Acute blood Loss   severe protein caloric malnutrition   S/P blood and FFP transfusion   Chronic kidney disease stage III  Depressed and withdrawn   on   NGT feeding on Vital  advanced to 55 cc/ hr   ? decannulation   GI prophylaxis with PPI   Discussed with TCV team and vascular

## 2021-09-16 NOTE — PROGRESS NOTE ADULT - ATTENDING COMMENTS
Recovering after vascular complication (pseudoaneurysm and RP bleed) during mesenteric angiogram last week. Still with pain. Tolerating tube feeds. Will consider repeat attempt at SMA stenting once he recovers from acute events of last week. Still having fevers, will further broaden antibiotics. Follow up culture results. Re-engage GI for consideration of PEGJ tube.

## 2021-09-16 NOTE — PROGRESS NOTE ADULT - SUBJECTIVE AND OBJECTIVE BOX
[facility] NUTRITION SUPPORT-- FOLLOW UP NOTE  -------------------------------------------------------------------------------    24 hour events/subjective: pt seen and examined. interim events noted. remains febrile. had vomiting overnight, ngt now out. c/o nausea and abd pain. having bms per flowsheets.    Diet:  Diet, NPO with Tube Feed:   Tube Feeding Modality: Nasogastric  Vital 1.5 Tank (VITAL1.5RTH)  Total Volume for 24 Hours (mL): 1560  Continuous  Starting Tube Feed Rate mL per Hour: 10  Increase Tube Feed Rate by (mL): 5     Every 24 hours  Until Goal Tube Feed Rate (mL per Hour): 65  Tube Feed Duration (in Hours): 24  Tube Feed Start Time: 11:45  Supplement Feeding Modality:  Nasogastric  Probiotic Yogurt/Smoothie Cans or Servings Per Day:  2       Frequency:  Daily (09-10-21 @ 21:18)      PAST HISTORY  --------------------------------------------------------------------------------  No significant changes to PMH, PSH, FHx, SHx, unless otherwise noted    ALLERGIES & MEDICATIONS  --------------------------------------------------------------------------------  Allergies    Amiodarone Hydrochloride (Other (Severe))    Intolerances      Standing Inpatient Medications  cefepime   IVPB 1000 milliGRAM(s) IV Intermittent every 8 hours  cefepime   IVPB      chlorhexidine 0.12% Liquid 15 milliLiter(s) Oral Mucosa two times a day  chlorhexidine 2% Cloths 1 Application(s) Topical <User Schedule>  dextrose 40% Gel 15 Gram(s) Oral once  dextrose 50% Injectable 25 Gram(s) IV Push once  dextrose 50% Injectable 12.5 Gram(s) IV Push once  glucagon  Injectable 1 milliGRAM(s) IntraMuscular once  insulin lispro (ADMELOG) corrective regimen sliding scale   SubCutaneous every 6 hours  lidocaine   4% Patch 1 Patch Transdermal daily  methimazole 15 milliGRAM(s) Oral <User Schedule>  metoclopramide Injectable 10 milliGRAM(s) IV Push every 8 hours  mirtazapine 7.5 milliGRAM(s) Oral daily  multivitamin 1 Tablet(s) Oral daily  pantoprazole  Injectable 40 milliGRAM(s) IV Push every 12 hours  propranolol 40 milliGRAM(s) Oral every 8 hours  sertraline 100 milliGRAM(s) Oral daily  sodium chloride 0.9%. 1000 milliLiter(s) IV Continuous <Continuous>  thiamine 100 milliGRAM(s) Oral daily  vancomycin    Solution 125 milliGRAM(s) Oral every 6 hours  vancomycin  IVPB      vancomycin  IVPB 500 milliGRAM(s) IV Intermittent every 12 hours    PRN Inpatient Medications  acetaminophen   Tablet .. 650 milliGRAM(s) Oral every 6 hours PRN  ondansetron Injectable 4 milliGRAM(s) IV Push every 6 hours PRN  simethicone 80 milliGRAM(s) Chew every 8 hours PRN  sodium chloride 0.9% lock flush 10 milliLiter(s) IV Push every 1 hour PRN        REVIEW OF SYSTEMS  --------------------------------------------------------------------------------    General:  as per hpi  HEENT:  no headaches, no vision changes, no ear pain, no epistaxis  CV:  no chest pain, no palpitations  Resp:  no dyspnea  GI:  as per hpi  :  no bleeding, no dysuria  Muscle:  no pain, no weakness  Neuro:  no numbness, no tingling, no memory problems  Psych:  no insomnia, no mood problems, no depression  Endocrine:  no cold/heat intolerance  Skin:  no rash, no edema        VITALS/PHYSICAL EXAM  --------------------------------------------------------------------------------  T(C): 37.3 (21 @ 08:00), Max: 38.3 (21 @ 06:00)  HR: 84 (21 @ 09:00) (84 - 109)  BP: --  RR: 35 (21 @ 09:00) (18 - 74)  SpO2: 100% (21 @ 09:00) (91% - 100%)  Wt(kg): --      09-15-21 @ 07:01  -  21 @ 07:00  --------------------------------------------------------  IN: 1595 mL / OUT: 1640 mL / NET: -45 mL    21 @ 07:01  -  21 @ 09:26  --------------------------------------------------------  IN: 100 mL / OUT: 0 mL / NET: 100 mL      I&O's Detail    15 Sep 2021 07:01  -  16 Sep 2021 07:00  --------------------------------------------------------  IN:    Enteral Tube Flush: 25 mL    IV PiggyBack: 200 mL    Miscellaneous Tube Feedin mL    sodium chloride 0.9%: 240 mL  Total IN: 1595 mL    OUT:    Drain (mL): 190 mL    Emesis (mL): 300 mL    Voided (mL): 1150 mL  Total OUT: 1640 mL    Total NET: -45 mL      16 Sep 2021 07:01  -  16 Sep 2021 09:26  --------------------------------------------------------  IN:    IV PiggyBack: 100 mL  Total IN: 100 mL    OUT:    Miscellaneous Tube Feedin mL  Total OUT: 0 mL    Total NET: 100 mL          Physical Exam:  Gen: lying in bed  HEENT: +trach  Chest: respirations non labored  Abd: softly dt generalized ttp +perc c tube  LE: no edema  Neuro: awake alert appropriate    LABS/STUDIES  --------------------------------------------------------------------------------              9.6    20.02 >-----------<  198      [21 @ 01:02]              30.3     131  |  96  |  13  ----------------------------<  178      [21 @ 01:02]  4.3   |  25  |  0.40        Ca     9.4     [21 01:02]      Mg     1.7     [21 01:02]      Phos  2.8     [21 01:02]    TPro  7.0  /  Alb  2.7  /  TBili  2.0  /  DBili  x   /  AST  16  /  ALT  35  /  AlkPhos  242  [21 @ 01:02]              [21 01:02]    Ca ionizedBlood Gas Arterial - Calcium, Ionized: 1.26 mmol/L (21 @ 00:35)  Blood Gas Arterial - Calcium, Ionized: 1.29 mmol/L (09-15-21 @ 00:02)    Creatinine Trend:  POC glucoseGlucose, Serum: 178 mg/dL (21 @ 01:02)  CAPILLARY BLOOD GLUCOSE      POCT Blood Glucose.: 111 mg/dL (16 Sep 2021 06:34)  POCT Blood Glucose.: 177 mg/dL (15 Sep 2021 17:12)  POCT Blood Glucose.: 145 mg/dL (15 Sep 2021 12:44)    PrealbuminPrealbumin, Serum: 11 mg/dL (21 @ 04:01)  Prealbumin, Serum: 10 mg/dL (08-15-21 @ 13:53)    Triglycerides

## 2021-09-16 NOTE — PROGRESS NOTE ADULT - ASSESSMENT
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 coli UTIs, driveline drainage (1/7/21) and COVID-19, here initially for GIB prolonged hospital course, s/p tracheostomy, acalculous cholecystitis s/p perc dawn. Patient has persistent intermittent abdominal pain, and was being evaluated for chronic mesenteric ischemia vs SMA syndrome.  8/13 bld cxs positive. TPN was consulted for Protein Calorie Malnutrition, Prealb 11, prior a1c 5.6, tg 141. Mesenteric duplex showied borderline stenosis of prox SMA,  Pt now s/p Mesenteric angiogram 9/10 unable to place stent. Pt had been tolerating feeds but with intermittent abd pain. SLP eval had recommended NPO status. Course c/b left rph and fevers    -with recurrent vomiting, ngt now out, f/u axr; feeds as per CTU; no present plans for TPN  -suspect hyperthyroidism contributing in part to metabolic issues ie. cAMP effects GI motility, hypercalcemia, tachycardia; improved, management as per endo  -sma stenosis- sp dx mesenteric angiogram by vascular but unable to pass stent, planned for eventual reattempt  -rph- cta ap noted, trend cbc, transfuse prn  -h/o abdominal pain/vomiting- as above, Zofran prn for nausea; Reglan for gut motility  -fever- cont abx, follow cxs, care as per ID  -electrolyte imbalance risk- monitor and replete elytes as needed; trend lfts  -management as per CTU; will remain available as needed    plan discussed with Dr. Soliz and Dr ANNE Tang, agreeable with above    TPN pager [phone], spectra [phone]  M-F 6A-4P, Weekends and holidays 8A-12P

## 2021-09-17 NOTE — PROGRESS NOTE ADULT - SUBJECTIVE AND OBJECTIVE BOX
SURGERY PROGRESS NOTE    SUBJECTIVE / 24H EVENTS:  Patient seen and examined on morning rounds. No acute events overnight, patient with intermittent emesis with TF.       OBJECTIVE:  VITAL SIGNS:  T(C): 37 (21 @ 12:00), Max: 37.9 (21 @ 16:00)  HR: 87 (21 @ 13:00) (87 - 107)  BP: --  RR: 29 (21 @ 13:00) (20 - 54)  SpO2: 99% (21 @ 13:00) (96% - 100%)  Daily     Daily Weight in k.4 (17 Sep 2021 00:00)  POCT Blood Glucose.: 137 mg/dL (21 @ 12:45)  POCT Blood Glucose.: 131 mg/dL (21 @ 06:15)  POCT Blood Glucose.: 111 mg/dL (21 @ 00:30)      PHYSICAL EXAM:  Gen: NAD  LS: Respirations unlabored on RA  GI: Soft. Nondistended. Mild tenderness to palpation in bilateral lower quadrants, improved tenderness on left flank  Groin: L groin dressing c/d/i. Mildly tender to palpation but no swelling, ecchymosis or palpable mass. Palpable femoral pulse  Ext: Warm, well perfused.      21 @ 07:01  -  21 @ 07:00  --------------------------------------------------------  IN:    IV PiggyBack: 350 mL    Miscellaneous Tube Feedin mL    sodium chloride 0.9%: 130 mL  Total IN: 820 mL    OUT:    Drain (mL): 125 mL    Voided (mL): 850 mL  Total OUT: 975 mL    Total NET: -155 mL      21 @ 07:01  -  21 @ 14:11  --------------------------------------------------------  IN:    Enteral Tube Flush: 50 mL    IV PiggyBack: 50 mL    Miscellaneous Tube Feedin mL    sodium chloride 0.9%: 70 mL  Total IN: 310 mL    OUT:  Total OUT: 0 mL    Total NET: 310 mL          LAB VALUES:      132<L>  |  95<L>  |  14  ----------------------------<  119<H>  4.1   |  25  |  0.48<L>    Ca    9.4      17 Sep 2021 00:51  Phos  3.5       Mg     1.8         TPro  7.3  /  Alb  2.5<L>  /  TBili  2.3<H>  /  DBili  x   /  AST  16  /  ALT  27  /  AlkPhos  234<H>                                 9.5    22.89 )-----------( 225      ( 17 Sep 2021 00:51 )             30.1     LIVER FUNCTIONS - ( 17 Sep 2021 00:51 )  Alb: 2.5 g/dL / Pro: 7.3 g/dL / ALK PHOS: 234 U/L / ALT: 27 U/L / AST: 16 U/L / GGT: x             ABG - ( 17 Sep 2021 00:38 )  pH, Arterial: 7.46  pH, Blood: x     /  pCO2: 43    /  pO2: 94    / HCO3: 31    / Base Excess: 6.0   /  SaO2: 99.4                      MICROBIOLOGY:    Culture - Blood (collected 15 Sep 2021 01:21)  Source: .Blood Blood  Preliminary Report (16 Sep 2021 02:02):    No growth to date.    Culture - Blood (collected 15 Sep 2021 01:20)  Source: .Blood Blood  Preliminary Report (16 Sep 2021 02:02):    No growth to date.    Culture - Sputum (collected 14 Sep 2021 16:27)  Source: .Sputum Sputum  Gram Stain (14 Sep 2021 23:29):    Moderate polymorphonuclear leukocytes per low power field    No Squamous epithelial cells per low power field    Few Gram Negative Rods per oil power field  Final Report (16 Sep 2021 18:16):    Moderate Serratia marcescens    Normal Respiratory Bria present  Organism: Serratia marcescens (16 Sep 2021 18:16)  Organism: Serratia marcescens (16 Sep 2021 18:16)    Culture - Blood (collected 14 Sep 2021 15:13)  Source: .Blood Blood  Preliminary Report (15 Sep 2021 16:01):    No growth to date.        RADIOLOGY:  PACS Image: Image(s) Available (21 @ 02:26)  PACS Image: Image(s) Available (21 @ 02:25)        MEDICATIONS  (STANDING):  chlorhexidine 0.12% Liquid 15 milliLiter(s) Oral Mucosa two times a day  chlorhexidine 2% Cloths 1 Application(s) Topical <User Schedule>  dextrose 40% Gel 15 Gram(s) Oral once  dextrose 50% Injectable 25 Gram(s) IV Push once  dextrose 50% Injectable 12.5 Gram(s) IV Push once  glucagon  Injectable 1 milliGRAM(s) IntraMuscular once  insulin lispro (ADMELOG) corrective regimen sliding scale   SubCutaneous every 6 hours  lidocaine   4% Patch 1 Patch Transdermal daily  meropenem  IVPB 1000 milliGRAM(s) IV Intermittent every 8 hours  methimazole 15 milliGRAM(s) Oral <User Schedule>  metoclopramide Injectable 10 milliGRAM(s) IV Push every 8 hours  mirtazapine 7.5 milliGRAM(s) Oral daily  multivitamin 1 Tablet(s) Oral daily  pantoprazole  Injectable 40 milliGRAM(s) IV Push every 12 hours  propranolol 40 milliGRAM(s) Oral every 8 hours  sertraline 100 milliGRAM(s) Oral daily  sodium chloride 0.9%. 1000 milliLiter(s) (10 mL/Hr) IV Continuous <Continuous>  thiamine 100 milliGRAM(s) Oral daily  vancomycin    Solution 125 milliGRAM(s) Oral every 6 hours    MEDICATIONS  (PRN):  acetaminophen   Tablet .. 650 milliGRAM(s) Oral every 6 hours PRN Mild Pain (1 - 3)  ondansetron Injectable 4 milliGRAM(s) IV Push every 6 hours PRN Nausea and/or Vomiting  simethicone 80 milliGRAM(s) Chew every 8 hours PRN Gas  sodium chloride 0.9% lock flush 10 milliLiter(s) IV Push every 1 hour PRN Pre/post blood products, medications, blood draw, and to maintain line patency

## 2021-09-17 NOTE — PROGRESS NOTE ADULT - SUBJECTIVE AND OBJECTIVE BOX
Admitted for: Anemia    Following for: Hyperthyroidism    Subjective:       MEDICATIONS  (STANDING):  chlorhexidine 0.12% Liquid 15 milliLiter(s) Oral Mucosa two times a day  chlorhexidine 2% Cloths 1 Application(s) Topical <User Schedule>  dextrose 40% Gel 15 Gram(s) Oral once  dextrose 50% Injectable 25 Gram(s) IV Push once  dextrose 50% Injectable 12.5 Gram(s) IV Push once  glucagon  Injectable 1 milliGRAM(s) IntraMuscular once  insulin lispro (ADMELOG) corrective regimen sliding scale   SubCutaneous every 6 hours  lidocaine   4% Patch 1 Patch Transdermal daily  meropenem  IVPB 1000 milliGRAM(s) IV Intermittent every 8 hours  methimazole 15 milliGRAM(s) Oral <User Schedule>  metoclopramide Injectable 10 milliGRAM(s) IV Push every 8 hours  mirtazapine 7.5 milliGRAM(s) Oral daily  multivitamin 1 Tablet(s) Oral daily  pantoprazole  Injectable 40 milliGRAM(s) IV Push every 12 hours  propranolol 40 milliGRAM(s) Oral every 8 hours  sertraline 100 milliGRAM(s) Oral daily  sodium chloride 0.9%. 1000 milliLiter(s) (10 mL/Hr) IV Continuous <Continuous>  thiamine 100 milliGRAM(s) Oral daily  vancomycin    Solution 125 milliGRAM(s) Oral every 6 hours    MEDICATIONS  (PRN):  acetaminophen   Tablet .. 650 milliGRAM(s) Oral every 6 hours PRN Mild Pain (1 - 3)  ondansetron Injectable 4 milliGRAM(s) IV Push every 6 hours PRN Nausea and/or Vomiting  simethicone 80 milliGRAM(s) Chew every 8 hours PRN Gas  sodium chloride 0.9% lock flush 10 milliLiter(s) IV Push every 1 hour PRN Pre/post blood products, medications, blood draw, and to maintain line patency      Allergies    Amiodarone Hydrochloride (Other (Severe))    Intolerances        PHYSICAL EXAM:  VITALS: T(C): 37 (09-17-21 @ 12:00)  T(F): 98.6 (09-17-21 @ 12:00), Max: 100.3 (09-16-21 @ 16:00)  HR: 87 (09-17-21 @ 13:00) (87 - 107)  BP: --  RR:  (20 - 54)  SpO2:  (96% - 100%)  Wt(kg): --  GENERAL: NAD  EYES: No proptosis, no lid lag, anicteric  HEENT:  Atraumatic  THYROID: Normal size, no palpable nodules  RESPIRATORY: Clear to auscultation bilaterally  CARDIOVASCULAR: Regular rate and rhythm; No murmurs; no peripheral edema  GI: Soft, nontender, non distended, normal bowel sounds  SKIN: Dry  PSYCH: Alert and oriented x 3, flat affect      A1C with Estimated Average Glucose Result: 5.4 % (08-15-21 @ 13:52)  A1C with Estimated Average Glucose Result: 5.6 % (06-15-21 @ 02:42)      POCT Blood Glucose.: 137 mg/dL (09-17-21 @ 12:45)  POCT Blood Glucose.: 131 mg/dL (09-17-21 @ 06:15)  POCT Blood Glucose.: 111 mg/dL (09-17-21 @ 00:30)  POCT Blood Glucose.: 112 mg/dL (09-16-21 @ 17:48)  POCT Blood Glucose.: 122 mg/dL (09-16-21 @ 14:33)  POCT Blood Glucose.: 66 mg/dL (09-16-21 @ 13:54)  POCT Blood Glucose.: 111 mg/dL (09-16-21 @ 06:34)  POCT Blood Glucose.: 177 mg/dL (09-15-21 @ 17:12)  POCT Blood Glucose.: 145 mg/dL (09-15-21 @ 12:44)  POCT Blood Glucose.: 142 mg/dL (09-15-21 @ 05:08)  POCT Blood Glucose.: 141 mg/dL (09-14-21 @ 23:57)  POCT Blood Glucose.: 139 mg/dL (09-14-21 @ 18:18)      09-17    132<L>  |  95<L>  |  14  ----------------------------<  119<H>  4.1   |  25  |  0.48<L>    EGFR if : 134  EGFR if non : 116    Ca    9.4      09-17  Mg     1.8     09-17  Phos  3.5     09-17    TPro  7.3  /  Alb  2.5<L>  /  TBili  2.3<H>  /  DBili  x   /  AST  16  /  ALT  27  /  AlkPhos  234<H>  09-17      Thyroid Function Tests:  09-17 @ 10:25 TSH -- FreeT4 -- T3 92 Anti TPO -- Anti Thyroglobulin Ab -- TSI --  09-15 @ 06:48 TSH -- FreeT4 3.5 T3 101 Anti TPO -- Anti Thyroglobulin Ab -- TSI --                         Admitted for: Anemia    Following for: Hyperthyroidism    Subjective: patient c/o abdominal pain      MEDICATIONS  (STANDING):  chlorhexidine 0.12% Liquid 15 milliLiter(s) Oral Mucosa two times a day  chlorhexidine 2% Cloths 1 Application(s) Topical <User Schedule>  dextrose 40% Gel 15 Gram(s) Oral once  dextrose 50% Injectable 25 Gram(s) IV Push once  dextrose 50% Injectable 12.5 Gram(s) IV Push once  glucagon  Injectable 1 milliGRAM(s) IntraMuscular once  insulin lispro (ADMELOG) corrective regimen sliding scale   SubCutaneous every 6 hours  lidocaine   4% Patch 1 Patch Transdermal daily  meropenem  IVPB 1000 milliGRAM(s) IV Intermittent every 8 hours  methimazole 15 milliGRAM(s) Oral <User Schedule>  metoclopramide Injectable 10 milliGRAM(s) IV Push every 8 hours  mirtazapine 7.5 milliGRAM(s) Oral daily  multivitamin 1 Tablet(s) Oral daily  pantoprazole  Injectable 40 milliGRAM(s) IV Push every 12 hours  propranolol 40 milliGRAM(s) Oral every 8 hours  sertraline 100 milliGRAM(s) Oral daily  sodium chloride 0.9%. 1000 milliLiter(s) (10 mL/Hr) IV Continuous <Continuous>  thiamine 100 milliGRAM(s) Oral daily  vancomycin    Solution 125 milliGRAM(s) Oral every 6 hours    MEDICATIONS  (PRN):  acetaminophen   Tablet .. 650 milliGRAM(s) Oral every 6 hours PRN Mild Pain (1 - 3)  ondansetron Injectable 4 milliGRAM(s) IV Push every 6 hours PRN Nausea and/or Vomiting  simethicone 80 milliGRAM(s) Chew every 8 hours PRN Gas  sodium chloride 0.9% lock flush 10 milliLiter(s) IV Push every 1 hour PRN Pre/post blood products, medications, blood draw, and to maintain line patency      Allergies    Amiodarone Hydrochloride (Other (Severe))    Intolerances        PHYSICAL EXAM:  VITALS: T(C): 37 (09-17-21 @ 12:00)  T(F): 98.6 (09-17-21 @ 12:00), Max: 100.3 (09-16-21 @ 16:00)  HR: 87 (09-17-21 @ 13:00) (87 - 107)  BP: --  RR:  (20 - 54)  SpO2:  (96% - 100%)  Wt(kg): --  GENERAL: NAD, NGT in place  EYES: No proptosis, no lid lag, anicteric  HEENT:  Atraumatic, trach in place  THYROID: Normal size, no palpable nodules  RESPIRATORY: Clear to auscultation bilaterally  CARDIOVASCULAR: Regular rate and rhythm; No murmurs; no peripheral edema  GI: Soft, nontender, non distended, normal bowel sounds  SKIN: Dry  PSYCH: Alert and oriented x 3, flat affect      A1C with Estimated Average Glucose Result: 5.4 % (08-15-21 @ 13:52)  A1C with Estimated Average Glucose Result: 5.6 % (06-15-21 @ 02:42)      POCT Blood Glucose.: 137 mg/dL (09-17-21 @ 12:45)  POCT Blood Glucose.: 131 mg/dL (09-17-21 @ 06:15)  POCT Blood Glucose.: 111 mg/dL (09-17-21 @ 00:30)  POCT Blood Glucose.: 112 mg/dL (09-16-21 @ 17:48)  POCT Blood Glucose.: 122 mg/dL (09-16-21 @ 14:33)  POCT Blood Glucose.: 66 mg/dL (09-16-21 @ 13:54)  POCT Blood Glucose.: 111 mg/dL (09-16-21 @ 06:34)  POCT Blood Glucose.: 177 mg/dL (09-15-21 @ 17:12)  POCT Blood Glucose.: 145 mg/dL (09-15-21 @ 12:44)  POCT Blood Glucose.: 142 mg/dL (09-15-21 @ 05:08)  POCT Blood Glucose.: 141 mg/dL (09-14-21 @ 23:57)  POCT Blood Glucose.: 139 mg/dL (09-14-21 @ 18:18)      09-17    132<L>  |  95<L>  |  14  ----------------------------<  119<H>  4.1   |  25  |  0.48<L>    EGFR if : 134  EGFR if non : 116    Ca    9.4      09-17  Mg     1.8     09-17  Phos  3.5     09-17    TPro  7.3  /  Alb  2.5<L>  /  TBili  2.3<H>  /  DBili  x   /  AST  16  /  ALT  27  /  AlkPhos  234<H>  09-17      Thyroid Function Tests:  09-17 @ 10:25 TSH -- FreeT4 -- T3 92 Anti TPO -- Anti Thyroglobulin Ab -- TSI --  09-15 @ 06:48 TSH -- FreeT4 3.5 T3 101 Anti TPO -- Anti Thyroglobulin Ab -- TSI --

## 2021-09-17 NOTE — PROGRESS NOTE ADULT - ASSESSMENT
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 coli UTIs, driveline drainage (1/7/21) and COVID-19, here initially for GIB prolonged hospital course, s/p tracheostomy, acalculous cholecystitis s/p perc dawn. Patient has persistent intermittent abdominal pain, CTA showing possible proximal SMA stenosis. CTA poor quality limited by significant streak artifact. Mesenteric duplex velocities without evidence of significant stenosis, however study unreliable in the setting of patient's augmented systolic function given LVAD. Now s/p diagnostic mesenteric angiogram with unsuccessful SMA stenting. 09/12 CTA obtained due to downtrending H/H with evidence of L RP hematoma without evidence of active extravasation.     PLAN:  - Appreciate ID recs, f/u treatment for suspected PNA  - Monitor L groin for signs of PSA or hematoma (No signs at the moment)  - Monitor LLE neurovascular exam  - Trend H/H  - Continue excellent care per CTICU    Vascular Surgery  [id number]   65M PMH ACC/AHA stage D HF due to NICM HM2 LVAD , TV annuloplasty ring 9/12/17 as destination therapy due to severe peripheral artery disease with significant stenosis  SIADH, Depression, CKD-3 with hyperkalemia, past E. coli UTIs, driveline drainage (1/7/21) and COVID-19, here initially for GIB prolonged hospital course, s/p tracheostomy, acalculous cholecystitis s/p perc dawn. Patient has persistent intermittent abdominal pain, CTA showing possible proximal SMA stenosis. CTA poor quality limited by significant streak artifact. Mesenteric duplex velocities without evidence of significant stenosis, however study unreliable in the setting of patient's augmented systolic function given LVAD. Now s/p diagnostic mesenteric angiogram with unsuccessful SMA stenting. 09/12 CTA obtained due to downtrending H/H with evidence of L RP hematoma without evidence of active extravasation, L groin wound stable.     PLAN:  - Appreciate ID recs, f/u treatment for suspected PNA  - Monitor LLE neurovascular exam  - Trend H/H  - Continue excellent care per CTICU    Vascular Surgery  [id number]

## 2021-09-17 NOTE — PROGRESS NOTE ADULT - PROBLEM SELECTOR PLAN 4
- ID following and appreciate recommendations   - serratia bacteremia and poss acalculous cholecystitis. B/c with gram + cocci and many enterobacter Carbapenem resistant strain and now s/p per dawn drain  - also with enterobacter from sputum culture 8/13  - sputum cx 9/14 positive for serratia marcescens  - Blood Cultures remains NGTD  - continues to have persistent leukocytosis (currently 22.89) and has low grade febrile episodes (tmax 100.3). If WBC continues to remain elevated or continues to have fevers would recommend obtaining CT scan  - currently on Meropenum IV q8  - would recommend sending C.diff sample as patient is having loose BM

## 2021-09-17 NOTE — PROGRESS NOTE ADULT - SUBJECTIVE AND OBJECTIVE BOX
RICKY HAMPTON  MRN-[id number]  Patient is a 66y old  Male who presents with a chief complaint of Anemia, Supratherapeutic INR, Dark Stools (16 Sep 2021 13:07)    HPI:  65M PMH ACC/AHA stage D HF due to NICM HM2 LVAD , TV annuloplasty ring 17 as destination therapy due to severe peripheral artery disease with significant stenosis  SIADH, Depression, CKD-3 with hyperkalemia, past E. coli UTIs, driveline drainage (21) and COVID-19 (back in 2020)  He was recently seen in clinic where he complained of abdominal pain and dark stools w constipation back in May. He presents to [facility] ER today weakness and fatigue, moderate and + Black stools for three days, on coumadin secondary to warfarin use in the setting of an LVAD. Patient has required transfusions for GIB in the past. Mostly recently back in 2021 pt had anemia with dark stools. No interventions was done at that time. However Last Endoscopy was done in 2020 (negative). Today labs show patient is anemic with H/H of 4.5/16.3,. INR is 8.84 MAP in the 90s, Temp 35.1. He denies any chest pain, shortness of breath, dizziness, abd pain, nausea or vomiting.       (2021 16:57)      Surgery/Hospital Course:   admit for melena w/ anemia, INR 8.84   6/15 Capsul study (+) for small bowel bleed, balloon endoscopy (old blood in prox ileum); post EGD - septic w/ L opacity, re-intubated for concern for aspiration, TTE (Mod MR, decrease biV w/ interventricular septum boweing towards R)   bronch    +C Diff    CT C/A/P: Fluid filled colon which may be 2/2 rapid transit. Small bilateral pleffs with associates. Compressive atelectasis New ISABELLE & LLL  parenchymal opacities, suspicious for pneumonia. 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    #8 Shiley trach at bedside    LVAD speed increased to 9200   Bronch   TC since . Patient transferred to SDU.    INR today 2.64.  H&H 7.3/24 this AM.  Will repeat CBC at noon, and will send stool guaiac Patient with persistent abdominal tenderness, rate of tube feeds decreased.  No nausea/vomiting.     INR today 2.4. H&H 9.1/28.6 low flow overnight /N&V, refusing Tube feeds on D5 1/2 normal  @50 cc/hr   INR 2.69  H&H 7.7/.1 refusing Tube feeds on D5 1/2 normal  @50 cc/hr. This am + BM Melena Dr Oneill HF  aware- PRBC x1  GI team consulted -  NPO  plan on study in am-  D/w Dr Cadet Patient  to return to CTU for further management; 1PRBCS    Post op INR 2.2 today.  No bleeding. BC + for SM.  Pt is hypotensive requiring pressor and inotropic support.  ID follow up today on Cefepime will follow.   R PTC for PTX    CT C/A/P: sub q emphysema in R chest wall, GGO RUL, small ascites CTH negative; Abd US: GB thickening, pericholecystic fluid     Perchole drain in place continues to drain total output overnight 133.  Fever today 38.8    duplex LE negative    Patient with persistent abdominal pain, refusing tube feeds and medications, Psych consulted   CTA A/P ordered to r/o mesenteric ischemia 2/2 persistent anorexia, nausea, vomiting. Revealed:  Evaluation of the mesenteric vessels is limited by streak artifact from LVAD. There appears to be severe stenosis of the proximal SMA; abdominal mesenteric doppler is recommended for further evaluation. 2.  No small bowel findings to suggest acute mesenteric ischemia. 3.  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  8/15: Cultures resulted BC 1/2 +GPC in clusters, SC enterobacter; mesenteric duplex: borderline stenosis of proximal SMA  : CT C/A/P noncon: Nondular opacities in R lung apex w/cavitation, abd nl  :  Continue current care, treatment of thyrotoxicosis with medications as per endocrine, d/c ABX as per team.    RUQ sono: Contracted gallbladder with cholecystostomy tube in place.  9/10 failed SMA stent, L fem PSA, s/p 3U PRCB   CTA Abd/Pelvis L RP hematoma     Today:  Dc'd IV vancomycin this AM. If clinically condition worsens within the next few days plan for a CTA.     REVIEW OF SYSTEMS:  Gen: No fever  EYES/ENT: No visual changes;  No vertigo or throat pain   NECK: No pain   RES:  No shortness of breath or Cough  CARD: No chest pain   GI: No abdominal pain  : No dysuria  NEURO: No weakness  SKIN: No itching, rashes     ICU Vital Signs Last 24 Hrs  T(C): 36.8 (17 Sep 2021 08:00), Max: 37.9 (16 Sep 2021 16:00)  T(F): 98.3 (17 Sep 2021 08:00), Max: 100.3 (16 Sep 2021 16:00)  HR: 87 (17 Sep 2021 11:00) (87 - 117)  BP: --  BP(mean): --  ABP: 79/63 (17 Sep 2021 11:00) (70/62 - 105/68)  ABP(mean): 71 (17 Sep 2021 11:00) (65 - 82)  RR: 27 (17 Sep 2021 11:00) (20 - 54)  SpO2: 99% (17 Sep 2021 11:00) (94% - 100%)      Physical Exam:  Gen:  Awake and alert, Sitting in chair in NAD.  Psych: Mood greatly improved but waxes and wanes, more interactive and participating in care. Would like to be more included in bedside rounding.  CNS:  intact, nonfocal, responds to all commands  Neck: no JVD, +TC   RES : course breath sounds, no wheezing, moderate tan secretions able to cough most up on own         CVS: +LVAD hum   Abd: Soft, RUQ tenderness by perc sybil site. Sybil drain with bilious fluid. Positive BS throughout.  Skin: No rash  Ext:  no edema    ============================I/O===========================   I&O's Detail    16 Sep 2021 07:01  -  17 Sep 2021 07:00  --------------------------------------------------------  IN:    IV PiggyBack: 350 mL    Miscellaneous Tube Feedin mL    sodium chloride 0.9%: 130 mL  Total IN: 820 mL    OUT:    Drain (mL): 125 mL    Voided (mL): 850 mL  Total OUT: 975 mL    Total NET: -155 mL      17 Sep 2021 07:01  -  17 Sep 2021 11:57  --------------------------------------------------------  IN:    Enteral Tube Flush: 50 mL    Miscellaneous Tube Feedin mL    sodium chloride 0.9%: 30 mL  Total IN: 140 mL    OUT:  Total OUT: 0 mL    Total NET: 140 mL        ============================ LABS =========================                        9.5    22.89 )-----------( 225      ( 17 Sep 2021 00:51 )             30.1         132<L>  |  95<L>  |  14  ----------------------------<  119<H>  4.1   |  25  |  0.48<L>    Ca    9.4      17 Sep 2021 00:51  Phos  3.5       Mg     1.8         TPro  7.3  /  Alb  2.5<L>  /  TBili  2.3<H>  /  DBili  x   /  AST  16  /  ALT  27  /  AlkPhos  234<H>      LIVER FUNCTIONS - ( 17 Sep 2021 00:51 )  Alb: 2.5 g/dL / Pro: 7.3 g/dL / ALK PHOS: 234 U/L / ALT: 27 U/L / AST: 16 U/L / GGT: x             ABG - ( 17 Sep 2021 00:38 )  pH, Arterial: 7.46  pH, Blood: x     /  pCO2: 43    /  pO2: 94    / HCO3: 31    / Base Excess: 6.0   /  SaO2: 99.4                ======================Micro/Rad/Cardio=================  Culture: Reviewed   CXR: Reviewed  Echo:Reviewed  ======================================================  PAST MEDICAL & SURGICAL HISTORY:  CHF (congestive heart failure)    CAD (coronary artery disease)    Depression    Pleural effusion    History of  novel coronavirus disease (COVID-19)  2020    Hemorrhoids    Bleeding hemorrhoids    Peripheral arterial disease    Claudication    BPH with urinary obstruction    ACC/AHA stage D systolic heart failure    Anticoagulation goal of INR 2.0 to 2.5    Falls    Clavicle fracture    CKD (chronic kidney disease), stage III    Iron deficiency anemia    H/O epistaxis    Vertigo    GI bleed    S/P TVR (tricuspid valve repair)    S/P ventricular assist device    S/P endoscopy      ====================ASSESSMENT ==============  Stage D Nonischemic Cardiomyopathy, Status Post HM2 on 2017    Cardiogenic shock  Hemodynamic instability   Acute hypoxemic respiratory failure s/p trach   GI bleed , Status Post Enteroscopy   Anemia, in setting of melena   Chronic Kidney Disease  Stress hyperglycemia   C.diff positive on    Hypovolemic shock  Septic shock  Leukocytosis  GB thickening/percholecystic s/p perc choley by IT   SMA stenosis  Serratia/citrobacter pneumonia   Stenotrophomonas pneumonia   Enterobacter pneumonia   Nasuea/vomiting  Deconditioning    Plan:  ====================== NEUROLOGY=====================  C/w mirtazapine and sertraline for depression  Deconditioned, PT/Ambulate as tolerated  Tylenol PRN for analgesia     acetaminophen   Tablet .. 650 milliGRAM(s) Oral every 6 hours PRN Mild Pain (1 - 3)  mirtazapine 7.5 milliGRAM(s) Oral daily  sertraline 100 milliGRAM(s) Oral daily    ==================== RESPIRATORY======================  Acute hypoxemic respiratory failure s/p #8 shiley trach on , continue TC as tolerated (TC since )  Continue close monitoring of respiratory rate and breathing pattern, following of ABG’s with A-line monitoring, continuous pulse oximetry monitoring.   Moderate secretions, continue suctioning prn, pulmonary toileting.      ====================CARDIOVASCULAR==================  Stage D Nonischemic Cardiomyopathy, Status Post HM2 on 2017; LVAD settings 9200, flow 6.2   TTE : reveals at least moderate MR, mild AI, severe global LV syst dysfxn, dec RV fxn   Propranolol for rate control of HR 2/2 Hyperthyroidism, titrate as tolerated per Endo  Continue invasive hemodynamic monitoring     propranolol 40 milliGRAM(s) Oral every 8 hours    ===================HEMATOLOGIC/ONC ===================  CTA A/P on  +L RP hematoma   Acute blood loss anemia, monitor H&H/Plts    Holding coumadin and ASA given recurrent GI bleeding    ===================== RENAL =========================  Continue monitoring urine output, I&OS, BUN/Cr   Euvolemic, even fluid balance, currently off diuretics.    Replete lytes PRN. Keep K> 4 and Mg >2     ==================== GASTROINTESTINAL===================  S/p cholecystostomy tube placed on  with IR   CTA A/P : Evaluation of the mesenteric vessels is limited by streak artifact from LVAD. There appears to be severe stenosis of the proximal SMA; abdominal mesenteric doppler is recommended for further evaluation. 2.  No small bowel findings to suggest acute mesenteric ischemia. 3.  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  Mesenteric duplex on 8/15 borderline stenosis of proximal SMA, s/p failed SMA stent, L fem RP hematoma on 9/10, s/p 3U PRBC.   Plan per Vascular surgeon Dr. Bran to re-attempt SMA stent by brachial cutdown when patient deemed stable to do so by Heart Failure team.   CTA A/P on  +L RP hematoma   Reglan for gut motility  Zofran PRN nausea/vomiting  Pt with multiple episodes of vomiting overnight. TF restarted today at 20cc/hr.  Pt would likely benefit from a PEG given persistent gastroparesis and TF intolerance.  However, pt does not wish to have PEG placed.      multivitamin 1 Tablet(s) Oral daily  GI prophylaxis, pantoprazole  Injectable 40 milliGRAM(s) IV Push every 12 hours  simethicone 80 milliGRAM(s) Chew every 8 hours PRN Gas  sodium chloride 0.9% lock flush 10 milliLiter(s) IV Push every 1 hour PRN Pre/post blood products, medications, blood draw, and to maintain line patency  sodium chloride 0.9%. 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 - Free T4 remains elevated   Per endocrine      - Thyroid US when trach is out      - Propanolol as above for optimal T4-->T3 conversion      - c/w Methimazole ---> consider changing to rectal if continues to have emesis for better absorption      - s/p hydrocortisone    dextrose 40% Gel 15 Gram(s) Oral once  dextrose 50% Injectable 25 Gram(s) IV Push once  dextrose 50% Injectable 12.5 Gram(s) IV Push once  glucagon  Injectable 1 milliGRAM(s) IntraMuscular once  insulin lispro (ADMELOG) corrective regimen sliding scale   SubCutaneous every 6 hours  methimazole 15 milliGRAM(s) Oral <User Schedule>    ========================INFECTIOUS DISEASE================  Afebrile, WBC rising 20.2 -> 22.89; leukocytosis   Continue trending WBC and monitoring fever curve  SCx : +Moderate Serratia marcescens, BCx on 9/15 BGTD   Empiric coverage broadened to Meropenem and Vancomycin; Dc' d IV vancomycin this AM.    meropenem  IVPB 1000 milliGRAM(s) IV Intermittent every 8 hours  vancomycin    Solution 125 milliGRAM(s) Oral every 6 hours      Patient requires continuous monitoring with bedside rhythm monitoring, pulse ox monitoring, and intermittent blood gas analysis. Care plan discussed with ICU care team. Patient remained critical and at risk for life threatening decompensation.     By signing my name below, IAparna, attest that this documentation has been prepared under the direction and in the presence of CLAUDIA Mejia   Electronically signed: Cesia Villegas, 21 @ 11:57    I, Go Sellers, personally performed the services described in this documentation. all medical record entries made by the scribe were at my direction and in my presence. I have reviewed the chart and agree that the record reflects my personal performance and is accurate and complete  Electronically signed: CLAUDIA Mejia        RICKY HAMPTON  MRN-[id number]  Patient is a 66y old Male who presents with a chief complaint of Anemia, Supratherapeutic INR, Dark Stools (16 Sep 2021 13:07)    HPI:  65M PMH ACC/AHA stage D HF due to NICM HM2 LVAD , TV annuloplasty ring 17 as destination therapy due to severe peripheral artery disease with significant stenosis  SIADH, Depression, CKD-3 with hyperkalemia, past E. coli UTIs, driveline drainage (21) and COVID-19 (back in 2020)  He was recently seen in clinic where he complained of abdominal pain and dark stools w constipation back in May. He presents to [facility] ER today weakness and fatigue, moderate and + Black stools for three days, on coumadin secondary to warfarin use in the setting of an LVAD. Patient has required transfusions for GIB in the past. Mostly recently back in 2021 pt had anemia with dark stools. No interventions was done at that time. However Last Endoscopy was done in 2020 (negative). Today labs show patient is anemic with H/H of 4.5/16.3,. INR is 8.84 MAP in the 90s, Temp 35.1. He denies any chest pain, shortness of breath, dizziness, abd pain, nausea or vomiting.       (2021 16:57)      Surgery/Hospital Course:   admit for melena w/ anemia, INR 8.84   6/15 Capsul study (+) for small bowel bleed, balloon endoscopy (old blood in prox ileum); post EGD - septic w/ L opacity, re-intubated for concern for aspiration, TTE (Mod MR, decrease biV w/ interventricular septum boweing towards R)   bronch    +C Diff    CT C/A/P: Fluid filled colon which may be 2/2 rapid transit. Small bilateral pleffs with associates. Compressive atelectasis New ISABELLE & LLL  parenchymal opacities, suspicious for pneumonia. 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    #8 Shiley trach at bedside    LVAD speed increased to 9200   Bronch   TC since . Patient transferred to SDU.    INR today 2.64.  H&H 7.3/24 this AM.  Will repeat CBC at noon, and will send stool guaiac Patient with persistent abdominal tenderness, rate of tube feeds decreased.  No nausea/vomiting.     INR today 2.4. H&H 9.1/28.6 low flow overnight /N&V, refusing Tube feeds on D5 1/2 normal  @50 cc/hr   INR 2.69  H&H 7.7/.1 refusing Tube feeds on D5 1/2 normal  @50 cc/hr. This am + BM Melena Dr Oneill HF  aware- PRBC x1  GI team consulted -  NPO  plan on study in am-  D/w Dr Cadet Patient  to return to CTU for further management; 1PRBCS    Post op INR 2.2 today.  No bleeding. BC + for SM.  Pt is hypotensive requiring pressor and inotropic support.  ID follow up today on Cefepime will follow.   R PTC for PTX    CT C/A/P: sub q emphysema in R chest wall, GGO RUL, small ascites CTH negative; Abd US: GB thickening, pericholecystic fluid     Perchole drain in place continues to drain total output overnight 133.  Fever today 38.8    duplex LE negative    Patient with persistent abdominal pain, refusing tube feeds and medications, Psych consulted   CTA A/P ordered to r/o mesenteric ischemia 2/2 persistent anorexia, nausea, vomiting. Revealed:  Evaluation of the mesenteric vessels is limited by streak artifact from LVAD. There appears to be severe stenosis of the proximal SMA; abdominal mesenteric doppler is recommended for further evaluation. 2.  No small bowel findings to suggest acute mesenteric ischemia. 3.  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  8/15: Cultures resulted BC 1/2 +GPC in clusters, SC enterobacter; mesenteric duplex: borderline stenosis of proximal SMA  : CT C/A/P noncon: Nondular opacities in R lung apex w/cavitation, abd nl  :  Continue current care, treatment of thyrotoxicosis with medications as per endocrine, d/c ABX as per team.    RUQ sono: Contracted gallbladder with cholecystostomy tube in place.  9/10 failed SMA stent, L fem PSA, s/p 3U PRCB   CTA Abd/Pelvis L RP hematoma     Today:  Dc'd IV vancomycin this AM. If clinically condition worsens within the next few days plan for a CT. TF increased from 20 to 25 ccs/hr.    REVIEW OF SYSTEMS:  Gen: No fever  EYES/ENT: No visual changes;  No vertigo or throat pain   NECK: No pain   RES:  No shortness of breath or Cough  CARD: No chest pain   GI: +L sided abdominal pain on palpation, +BS  : No dysuria  NEURO: No weakness  SKIN: No itching, rashes     ICU Vital Signs Last 24 Hrs  T(C): 36.8 (17 Sep 2021 08:00), Max: 37.9 (16 Sep 2021 16:00)  T(F): 98.3 (17 Sep 2021 08:00), Max: 100.3 (16 Sep 2021 16:00)  HR: 87 (17 Sep 2021 11:00) (87 - 117)  BP: --  BP(mean): --  ABP: 79/63 (17 Sep 2021 11:00) (70/62 - 105/68)  ABP(mean): 71 (17 Sep 2021 11:00) (65 - 82)  RR: 27 (17 Sep 2021 11:00) (20 - 54)  SpO2: 99% (17 Sep 2021 11:00) (94% - 100%)      Physical Exam:  Gen:  Awake and alert, Sitting in chair in NAD.  Psych: Mood greatly improved but waxes and wanes, more interactive and participating in care. Would like to be more included in bedside rounding.  CNS:  intact, nonfocal, responds to all commands  Neck: no JVD, +TC   RES : course breath sounds, no wheezing, moderate tan secretions able to cough most up on own         CVS: +LVAD hum   Abd: Soft. Sybil drain with bilious fluid. Positive BS throughout. +L sided abdominal pain  Skin: No rash  Ext:  no edema    ============================I/O===========================   I&O's Detail    16 Sep 2021 07:01  -  17 Sep 2021 07:00  --------------------------------------------------------  IN:    IV PiggyBack: 350 mL    Miscellaneous Tube Feedin mL    sodium chloride 0.9%: 130 mL  Total IN: 820 mL    OUT:    Drain (mL): 125 mL    Voided (mL): 850 mL  Total OUT: 975 mL    Total NET: -155 mL      17 Sep 2021 07:01  -  17 Sep 2021 11:57  --------------------------------------------------------  IN:    Enteral Tube Flush: 50 mL    Miscellaneous Tube Feedin mL    sodium chloride 0.9%: 30 mL  Total IN: 140 mL    OUT:  Total OUT: 0 mL    Total NET: 140 mL        ============================ LABS =========================                        9.5    22.89 )-----------( 225      ( 17 Sep 2021 00:51 )             30.1         132<L>  |  95<L>  |  14  ----------------------------<  119<H>  4.1   |  25  |  0.48<L>    Ca    9.4      17 Sep 2021 00:51  Phos  3.5       Mg     1.8         TPro  7.3  /  Alb  2.5<L>  /  TBili  2.3<H>  /  DBili  x   /  AST  16  /  ALT  27  /  AlkPhos  234<H>      LIVER FUNCTIONS - ( 17 Sep 2021 00:51 )  Alb: 2.5 g/dL / Pro: 7.3 g/dL / ALK PHOS: 234 U/L / ALT: 27 U/L / AST: 16 U/L / GGT: x             ABG - ( 17 Sep 2021 00:38 )  pH, Arterial: 7.46  pH, Blood: x     /  pCO2: 43    /  pO2: 94    / HCO3: 31    / Base Excess: 6.0   /  SaO2: 99.4                ======================Micro/Rad/Cardio=================  Culture: Reviewed   CXR: Reviewed  Echo:Reviewed  ======================================================  PAST MEDICAL & SURGICAL HISTORY:  CHF (congestive heart failure)    CAD (coronary artery disease)    Depression    Pleural effusion    History of  novel coronavirus disease (COVID-19)  2020    Hemorrhoids    Bleeding hemorrhoids    Peripheral arterial disease    Claudication    BPH with urinary obstruction    ACC/AHA stage D systolic heart failure    Anticoagulation goal of INR 2.0 to 2.5    Falls    Clavicle fracture    CKD (chronic kidney disease), stage III    Iron deficiency anemia    H/O epistaxis    Vertigo    GI bleed    S/P TVR (tricuspid valve repair)    S/P ventricular assist device    S/P endoscopy      ====================ASSESSMENT ==============  Stage D Nonischemic Cardiomyopathy, Status Post HM2 on 2017    Cardiogenic shock  Hemodynamic instability   Acute hypoxemic respiratory failure s/p trach   GI bleed , Status Post Enteroscopy   Anemia, in setting of melena   Chronic Kidney Disease  Stress hyperglycemia   C.diff positive on    Hypovolemic shock  Septic shock  Leukocytosis  GB thickening/percholecystic s/p perc choley by IT   SMA stenosis  Serratia/citrobacter pneumonia   Stenotrophomonas pneumonia   Enterobacter pneumonia   Nasuea/vomiting  Deconditioning    Plan:  ====================== NEUROLOGY=====================  C/w mirtazapine and sertraline for depression  Deconditioned, PT/Ambulate as tolerated  Tylenol PRN for analgesia     acetaminophen   Tablet .. 650 milliGRAM(s) Oral every 6 hours PRN Mild Pain (1 - 3)  mirtazapine 7.5 milliGRAM(s) Oral daily  sertraline 100 milliGRAM(s) Oral daily    ==================== RESPIRATORY======================  Acute hypoxemic respiratory failure s/p #8 shiley trach on , continue TC as tolerated (TC since )  Continue close monitoring of respiratory rate and breathing pattern, following of ABG’s with A-line monitoring, continuous pulse oximetry monitoring.   Moderate secretions, continue suctioning prn, pulmonary toileting.      ====================CARDIOVASCULAR==================  Stage D Nonischemic Cardiomyopathy, Status Post HM2 on 2017; LVAD settings 9200, flow 6.2   TTE : reveals at least moderate MR, mild AI, severe global LV syst dysfxn, dec RV fxn   Propranolol for rate control of HR 2/2 Hyperthyroidism, titrate as tolerated per Endo  Continue invasive hemodynamic monitoring     propranolol 40 milliGRAM(s) Oral every 8 hours    ===================HEMATOLOGIC/ONC ===================  CTA A/P on  +L RP hematoma   Acute blood loss anemia, monitor H&H/Plts    Holding coumadin and ASA given recurrent GI bleeding    ===================== RENAL =========================  Continue monitoring urine output, I&OS, BUN/Cr   Euvolemic, even fluid balance, currently off diuretics.    Replete lytes PRN. Keep K> 4 and Mg >2     ==================== GASTROINTESTINAL===================  S/p cholecystostomy tube placed on  with IR   CTA A/P : Evaluation of the mesenteric vessels is limited by streak artifact from LVAD. There appears to be severe stenosis of the proximal SMA; abdominal mesenteric doppler is recommended for further evaluation. 2.  No small bowel findings to suggest acute mesenteric ischemia. 3.  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  Mesenteric duplex on 8/15 borderline stenosis of proximal SMA, s/p failed SMA stent, L fem RP hematoma on 9/10, s/p 3U PRBC.   Plan per Vascular surgeon Dr. Bran to re-attempt SMA stent by brachial cutdown when patient deemed stable to do so by Heart Failure team.   CTA A/P on  +L RP hematoma   Reglan for gut motility  Zofran PRN nausea/vomiting  Pt with multiple episodes of vomiting overnight. TF restarted today at 20cc/hr.  Pt would likely benefit from a PEG given persistent gastroparesis and TF intolerance.  However, pt does not wish to have PEG placed.      multivitamin 1 Tablet(s) Oral daily  GI prophylaxis, pantoprazole  Injectable 40 milliGRAM(s) IV Push every 12 hours  simethicone 80 milliGRAM(s) Chew every 8 hours PRN Gas  sodium chloride 0.9% lock flush 10 milliLiter(s) IV Push every 1 hour PRN Pre/post blood products, medications, blood draw, and to maintain line patency  sodium chloride 0.9%. 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 - Free T4 remains elevated   Per endocrine      - Thyroid US when trach is out      - Propanolol as above for optimal T4-->T3 conversion      - c/w Methimazole ---> consider changing to rectal if continues to have emesis for better absorption      - s/p hydrocortisone    dextrose 40% Gel 15 Gram(s) Oral once  dextrose 50% Injectable 25 Gram(s) IV Push once  dextrose 50% Injectable 12.5 Gram(s) IV Push once  glucagon  Injectable 1 milliGRAM(s) IntraMuscular once  insulin lispro (ADMELOG) corrective regimen sliding scale   SubCutaneous every 6 hours  methimazole 15 milliGRAM(s) Oral <User Schedule>    ========================INFECTIOUS DISEASE================  Afebrile, WBC rising 20.2 -> 22.89; leukocytosis   Continue trending WBC and monitoring fever curve  SCx : +Moderate Serratia marcescens, BCx on 9/15 BGTD   Empiric coverage broadened to Meropenem and Vancomycin; Dc' d IV vancomycin this AM.      I have spent 30 minutes of non-continuous critical care time with this patient  meropenem  IVPB 1000 milliGRAM(s) IV Intermittent every 8 hours  vancomycin    Solution 125 milliGRAM(s) Oral every 6 hours      Patient requires continuous monitoring with bedside rhythm monitoring, pulse ox monitoring, and intermittent blood gas analysis. Care plan discussed with ICU care team. Patient remained critical and at risk for life threatening decompensation.     By signing my name below, I, Aparna Saleh, attest that this documentation has been prepared under the direction and in the presence of CLAUDIA Mejia   Electronically signed: Cesia Villgeas, 21 @ 11:57    I, Go Sellers, personally performed the services described in this documentation. all medical record entries made by the luisibe were at my direction and in my presence. I have reviewed the chart and agree that the record reflects my personal performance and is accurate and complete  Electronically signed: CLAUDIA Mejia        RICKY HAMPTON  MRN-[id number]  Patient is a 66y old Male who presents with a chief complaint of Anemia, Supratherapeutic INR, Dark Stools (16 Sep 2021 13:07)    HPI:  65M PMH ACC/AHA stage D HF due to NICM HM2 LVAD , TV annuloplasty ring 17 as destination therapy due to severe peripheral artery disease with significant stenosis  SIADH, Depression, CKD-3 with hyperkalemia, past E. coli UTIs, driveline drainage (21) and COVID-19 (back in 2020)  He was recently seen in clinic where he complained of abdominal pain and dark stools w constipation back in May. He presents to [facility] ER today weakness and fatigue, moderate and + Black stools for three days, on coumadin secondary to warfarin use in the setting of an LVAD. Patient has required transfusions for GIB in the past. Mostly recently back in 2021 pt had anemia with dark stools. No interventions was done at that time. However Last Endoscopy was done in 2020 (negative). Today labs show patient is anemic with H/H of 4.5/16.3,. INR is 8.84 MAP in the 90s, Temp 35.1. He denies any chest pain, shortness of breath, dizziness, abd pain, nausea or vomiting.     (2021 16:57)    Surgery/Hospital Course:   admit for melena w/ anemia, INR 8.84   6/15 Capsul study (+) for small bowel bleed, balloon endoscopy (old blood in prox ileum); post EGD - septic w/ L opacity, re-intubated for concern for aspiration, TTE (Mod MR, decrease biV w/ interventricular septum boweing towards R)   bronch    +C Diff    CT C/A/P: Fluid filled colon which may be 2/2 rapid transit. Small bilateral pleffs with associates. Compressive atelectasis New ISABELLE & LLL  parenchymal opacities, suspicious for pneumonia. 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    #8 Shiley trach at bedside    LVAD speed increased to 9200   Bronch   TC since . Patient transferred to SDU.    INR today 2.64.  H&H 7.3/24 this AM.  Will repeat CBC at noon, and will send stool guaiac Patient with persistent abdominal tenderness, rate of tube feeds decreased.  No nausea/vomiting.     INR today 2.4. H&H 9.1/28.6 low flow overnight /N&V, refusing Tube feeds on D5 1/2 normal  @50 cc/hr   INR 2.69  H&H 7.7/.1 refusing Tube feeds on D5 1/2 normal  @50 cc/hr. This am + BM Melena Dr Oneill HF  aware- PRBC x1  GI team consulted -  NPO  plan on study in am-  D/w Dr Cadet Patient  to return to CTU for further management; 1PRBCS    Post op INR 2.2 today.  No bleeding. BC + for SM.  Pt is hypotensive requiring pressor and inotropic support.  ID follow up today on Cefepime will follow.   R PTC for PTX    CT C/A/P: sub q emphysema in R chest wall, GGO RUL, small ascites CTH negative; Abd US: GB thickening, pericholecystic fluid     Perchole drain in place continues to drain total output overnight 133.  Fever today 38.8    duplex LE negative    Patient with persistent abdominal pain, refusing tube feeds and medications, Psych consulted   CTA A/P ordered to r/o mesenteric ischemia 2/2 persistent anorexia, nausea, vomiting. Revealed:  Evaluation of the mesenteric vessels is limited by streak artifact from LVAD. There appears to be severe stenosis of the proximal SMA; abdominal mesenteric doppler is recommended for further evaluation. 2.  No small bowel findings to suggest acute mesenteric ischemia. 3.  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  8/15: Cultures resulted BC 1/2 +GPC in clusters, SC enterobacter; mesenteric duplex: borderline stenosis of proximal SMA  : CT C/A/P noncon: Nondular opacities in R lung apex w/cavitation, abd nl  :  Continue current care, treatment of thyrotoxicosis with medications as per endocrine, d/c ABX as per team.    RUQ sono: Contracted gallbladder with cholecystostomy tube in place.  9/10 failed SMA stent, L fem PSA, s/p 3U PRCB   CTA Abd/Pelvis L RP hematoma     Today:  Dc'd IV vancomycin this AM. If clinically condition worsens within the next few days plan for a CT. TF increased from 20 to 25 ccs/hr.    REVIEW OF SYSTEMS:  Gen: No fever  EYES/ENT: No visual changes;  No vertigo or throat pain   NECK: No pain   RES:  No shortness of breath or Cough  CARD: No chest pain   GI: +L sided abdominal pain on palpation, +BS  : No dysuria  NEURO: No weakness  SKIN: No itching, rashes     ICU Vital Signs Last 24 Hrs  T(C): 36.8 (17 Sep 2021 08:00), Max: 37.9 (16 Sep 2021 16:00)  T(F): 98.3 (17 Sep 2021 08:00), Max: 100.3 (16 Sep 2021 16:00)  HR: 87 (17 Sep 2021 11:00) (87 - 117)  ABP: 79/63 (17 Sep 2021 11:00) (70/62 - 105/68)  ABP(mean): 71 (17 Sep 2021 11:00) (65 - 82)  RR: 27 (17 Sep 2021 11:00) (20 - 54)  SpO2: 99% (17 Sep 2021 11:00) (94% - 100%)    Physical Exam:  Gen:  Awake and alert, Sitting in chair in NAD.  Psych: Mood greatly improved but waxes and wanes, more interactive and participating in care. Would like to be more included in bedside rounding.  CNS:  intact, nonfocal, responds to all commands  Neck: no JVD, +TC   RES : course breath sounds, no wheezing, moderate tan secretions able to cough most up on own         CVS: +LVAD hum   Abd: Soft. Sybil drain with bilious fluid. Positive BS throughout. +L sided abdominal pain  Skin: No rash  Ext:  no edema    ============================I/O===========================   I&O's Detail    16 Sep 2021 07:01  -  17 Sep 2021 07:00  --------------------------------------------------------  IN:    IV PiggyBack: 350 mL    Miscellaneous Tube Feedin mL    sodium chloride 0.9%: 130 mL  Total IN: 820 mL    OUT:    Drain (mL): 125 mL    Voided (mL): 850 mL  Total OUT: 975 mL    Total NET: -155 mL    17 Sep 2021 07:01  -  17 Sep 2021 11:57  --------------------------------------------------------  IN:    Enteral Tube Flush: 50 mL    Miscellaneous Tube Feedin mL    sodium chloride 0.9%: 30 mL  Total IN: 140 mL    OUT:  Total OUT: 0 mL    Total NET: 140 mL    ============================ LABS =========================                        9.5    22.89 )-----------( 225      ( 17 Sep 2021 00:51 )             30.1     132<L>  |  95<L>  |  14  ----------------------------<  119<H>  4.1   |  25  |  0.48<L>    Ca    9.4      17 Sep 2021 00:51  Phos  3.5       Mg     1.8         TPro  7.3  /  Alb  2.5<L>  /  TBili  2.3<H>  /  DBili  x   /  AST  16  /  ALT  27  /  AlkPhos  234<H>      LIVER FUNCTIONS - ( 17 Sep 2021 00:51 )Alb: 2.5 g/dL / Pro: 7.3 g/dL / ALK PHOS: 234 U/L / ALT: 27 U/L / AST: 16 U/L / GGT: x           ABG - ( 17 Sep 2021 00:38 )  pH, Arterial: 7.46  pH, Blood: x     /  pCO2: 43    /  pO2: 94    / HCO3: 31    / Base Excess: 6.0   /  SaO2: 99.4      ======================Micro/Rad/Cardio=================  Culture: Reviewed   CXR: Reviewed  Echo:Reviewed    ======================================================  PAST MEDICAL & SURGICAL HISTORY:  CHF (congestive heart failure)    CAD (coronary artery disease)    Depression    Pleural effusion    History of  novel coronavirus disease (COVID-19)  2020    Hemorrhoids    Bleeding hemorrhoids    Peripheral arterial disease    Claudication    BPH with urinary obstruction    ACC/AHA stage D systolic heart failure    Anticoagulation goal of INR 2.0 to 2.5    Falls    Clavicle fracture    CKD (chronic kidney disease), stage III    Iron deficiency anemia    H/O epistaxis    Vertigo    GI bleed    S/P TVR (tricuspid valve repair)    S/P ventricular assist device    S/P endoscopy    ====================ASSESSMENT ==============  Stage D Nonischemic Cardiomyopathy, Status Post HM2 on 2017    Cardiogenic shock  Hemodynamic instability   Acute hypoxemic respiratory failure s/p trach   GI bleed , Status Post Enteroscopy   Anemia, in setting of melena   Chronic Kidney Disease  Stress hyperglycemia   C.diff positive on    Hypovolemic shock  Septic shock  Leukocytosis  GB thickening/percholecystic s/p perc choley by IT   SMA stenosis  Serratia/citrobacter pneumonia   Stenotrophomonas pneumonia   Enterobacter pneumonia   Nasuea/vomiting  Deconditioning    Plan:  ====================== NEUROLOGY=====================  C/w mirtazapine and sertraline for depression  Deconditioned, PT/Ambulate as tolerated  Tylenol PRN for analgesia     acetaminophen   Tablet .. 650 milliGRAM(s) Oral every 6 hours PRN Mild Pain (1 - 3)  mirtazapine 7.5 milliGRAM(s) Oral daily  sertraline 100 milliGRAM(s) Oral daily    ==================== RESPIRATORY======================  Acute hypoxemic respiratory failure s/p #8 betzaida trach on , continue TC as tolerated (TC since )  Continue close monitoring of respiratory rate and breathing pattern, following of ABG’s with A-line monitoring, continuous pulse oximetry monitoring.   Moderate secretions, continue suctioning prn, pulmonary toileting.      ====================CARDIOVASCULAR==================  Stage D Nonischemic Cardiomyopathy, Status Post HM2 on 2017; LVAD settings 9200, flow 6.2   TTE : reveals at least moderate MR, mild AI, severe global LV syst dysfxn, dec RV fxn   Propranolol for rate control of HR 2/2 Hyperthyroidism, titrate as tolerated per Endo  Continue invasive hemodynamic monitoring     propranolol 40 milliGRAM(s) Oral every 8 hours    ===================HEMATOLOGIC/ONC ===================  CTA A/P on  +L RP hematoma   Acute blood loss anemia, monitor H&H/Plts    Holding coumadin and ASA given recurrent GI bleeding    ===================== RENAL =========================  Continue monitoring urine output, I&OS, BUN/Cr   Euvolemic, even fluid balance, currently off diuretics.    Replete lytes PRN. Keep K> 4 and Mg >2     ==================== GASTROINTESTINAL===================  S/p cholecystostomy tube placed on  with IR   CTA A/P : Evaluation of the mesenteric vessels is limited by streak artifact from LVAD. There appears to be severe stenosis of the proximal SMA; abdominal mesenteric doppler is recommended for further evaluation. 2.  No small bowel findings to suggest acute mesenteric ischemia. 3.  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  Mesenteric duplex on 8/15 borderline stenosis of proximal SMA, s/p failed SMA stent, L fem RP hematoma on 9/10, s/p 3U PRBC.   Plan per Vascular surgeon Dr. Bran to re-attempt SMA stent by brachial cutdown when patient deemed stable to do so by Heart Failure team.   CTA A/P on  +L RP hematoma   Reglan for gut motility  Zofran PRN nausea/vomiting  Pt with multiple episodes of vomiting overnight. TF restarted today at 20cc/hr.  Pt would likely benefit from a PEG given persistent gastroparesis and TF intolerance.  However, pt does not wish to have PEG placed.    multivitamin 1 Tablet(s) Oral daily  GI prophylaxis, pantoprazole  Injectable 40 milliGRAM(s) IV Push every 12 hours  simethicone 80 milliGRAM(s) Chew every 8 hours PRN Gas  sodium chloride 0.9% lock flush 10 milliLiter(s) IV Push every 1 hour PRN Pre/post blood products, medications, blood draw, and to maintain line patency  sodium chloride 0.9%. 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 - Free T4 remains elevated   Per endocrine      - Thyroid US when trach is out      - Propanolol as above for optimal T4-->T3 conversion      - c/w Methimazole ---> consider changing to rectal if continues to have emesis for better absorption      - s/p hydrocortisone    dextrose 40% Gel 15 Gram(s) Oral once  dextrose 50% Injectable 25 Gram(s) IV Push once  dextrose 50% Injectable 12.5 Gram(s) IV Push once  glucagon  Injectable 1 milliGRAM(s) IntraMuscular once  insulin lispro (ADMELOG) corrective regimen sliding scale   SubCutaneous every 6 hours  methimazole 15 milliGRAM(s) Oral <User Schedule>    ========================INFECTIOUS DISEASE================  Afebrile, WBC rising 20.2 -> 22.89; leukocytosis   Continue trending WBC and monitoring fever curve  SCx : +Moderate Serratia marcescens, BCx on 9/15 BGTD   Empiric coverage broadened to Meropenem and Vancomycin; Dc' d IV vancomycin this AM.    meropenem  IVPB 1000 milliGRAM(s) IV Intermittent every 8 hours  vancomycin    Solution 125 milliGRAM(s) Oral every 6 hours    I have spent 30 minutes of non-continuous critical care time with this patient    Patient requires continuous monitoring with bedside rhythm monitoring, pulse ox monitoring, and intermittent blood gas analysis. Care plan discussed with ICU care team. Patient remained critical and at risk for life threatening decompensation.     By signing my name below, I, Aparna Saleh, attest that this documentation has been prepared under the direction and in the presence of CLAUDIA Mejia   Electronically signed: Cesia Villegas, 21 @ 11:57    I, Go Sellers, personally performed the services described in this documentation. all medical record entries made by the luisibe were at my direction and in my presence. I have reviewed the chart and agree that the record reflects my personal performance and is accurate and complete  Electronically signed: CLAUDIA Mejia

## 2021-09-17 NOTE — PROGRESS NOTE ADULT - PROBLEM SELECTOR PLAN 3
- Chronic in nature with questionable SMA stenosis on imaging and mesenteric duplex difficult to interpret with continuous flow. Underwent angiogram on 9/11 which showed SMA stenosis. Unable to place stent. Would hold off on repeating angiogram at this time.   - Holding tube fees as patient had episodes of emesis and NG tube fell out. Will attempt to replace today and will then continue to advance feeds as tolerates, goal rate 65 cc/hr  - Will need to discuss with GI about patient having PEGJ tube placed  - Discussed cholecystostomy with GI surgery, unlikely to derive symptomatic benefit since perc dawn tube continues to drain. Repeat RUQ shows contracted gallbladder

## 2021-09-17 NOTE — PROGRESS NOTE ADULT - SUBJECTIVE AND OBJECTIVE BOX
RICKY JOINT  MRN#: [id number]  Subjective:  Pulmonary progress  : recurrent Acute hypoxic respiratory Failure ,aspiration pneumonia, NICM  , chart reviewed and H/O obtained radiological and Laboratory study reviewed patient Examined     65M PMH ACC/AHA stage D HF due to NICM HM2 LVAD , TV annuloplasty ring 17 as destination therapy due to severe peripheral artery disease with significant stenosis  SIADH, Depression, CKD-3 with hyperkalemia, past E. coli UTIs, driveline drainage (21) and COVID-19 (back in 2020)  He was recently seen in clinic where he complained of abdominal pain and dark stools w constipation back in May. He presents to [facility] ER today weakness and fatigue, moderate and + Black stools for three days, on coumadin secondary to warfarin use in the setting of an LVAD. Patient has required transfusions for GIB in the past. Mostly recently back in 2021 pt had anemia with dark stools. No interventions was done at that time. However Last Endoscopy was done in 2020 (negative). Today labs show patient is anemic with H/H of 4.5/16.3,. INR is 8.84 MAP in the 90s, Temp 35.1. He denies any chest pain, shortness of breath, dizziness, abd pain, nausea or vomiting. found to have  rectal bleeding underwent endoscopy ,old blood in the proximal ileum ,  develop sepsis with LL opacity given Antibiotics , Extubated , reintubated , Bronchoscopy on Zosyn for LL pneumonia  and Amiodarone S/P TV Annuloplasty , patient remain intubated on full ventilatory support .S/P multiple units of blood transfusion , remain on full ventilatory support on Precedex and propofol , new central IJ line , diarrhea C diff. +ve on po vancomycin and IV Flagyl,  mildly distended belly , fever start on cefepime 2gm q 8 hrs S/P tracheostomy .  new RT Subclavian central line continue on contact  isolation ,still diarrhea on C-diff antibiotics ENT follow up appreciated , trial of C-PAP as tolerated , , copious secretion from trach. site chest x ray left lower lobe pneumonia , tolerating trch. collar 50% FI02 still excessive secretion need pulmonary toilet , off Ancef antibiotic , no more diarrhea back on full support mechanical ventilator , chest x ray show improvement in LLL air space disease, more awake and responsive on tube feeding no more diarrhea , S/P  Ancef for bacteremia no fever ,ID follow up noted ,  no nausea or vomiting or diarrhea still very weak and tired , event note pulled NG tube now replaced , back on tube feeding ,still on po vancomycin , getting PT and OT at bed side , no plan for decannulation for now. , no more diarrhea receiving PT and OPT at bed side , minimal secretion from tracheostomy site , no SOB getting stronger , improve muscle tone patient transfer to monitor bed still on contact isolation for C-Difficel colitis on 50% FI02, NG tube clogged , and change to resume tube feeding still loose stool . H/H drop significantly require blood transfusion , most likely GI bleeding , IV heparin D/C , vomiting 200 cc of creamy color tube feeding on hold no sob, still melena monitor in the CTU possible capsule endoscopy , H/H is stable ., patient develop TR sided  pneumothorax require chest tube placement , RT IJ central line  placed , develop fever shaking chills , blood culture positive for serratia marcescens , start on cefepime .the patient  become hypoxic and hypotensive placed on full ventilatory support and Vasopressin , levophed and Dobutamine ,S/P blood transfusion on meropenem and vancomycin ,   , on and  off pressors , occasional agitation on Precedex .S/P IR cholecystostomy tube drainage placement in the RT upper Quadrant , resume anticoagulation chest x ray noted C-PAP trail lasted only for 2 hrs , new RT SC line and D/C RT IJ line , RT pig tail cathter has been removed , tolerating C-PAP trial placed on trach. collar 50% FI02 GI consultant noted on NG tube feeding as tolerated , develop AF RVR S/P  bolus Amiodarone  back to regular sinus Rhythm , Flat Affect depressed , back on tube feeding Vital AF at 60 cc/ hr .still intermittent abdominal pain , no fever saturation is accepted  back on full ventilatory support , tired and sleepy on round    .start  back on  tube feeding . tolerating it very well , no nausea or vomiting , good 02 saturation on trac-collar on methimazole for hyperthyroidism , no new C/O no plan for decannulation yet , electrolyte blood test is still pending .on respiratory isolation sputum culture is negative , increase WBC  improved on cefepime , sputum positive for enterococcus , condition is the same ,still C/O Abdominal pain , white count is improving , no chest pain or SOB ,  .placed on Reglan 10 mg TID for gastric motility , depressed , withdrawn. on full NG tube diet with Vital , secretion are much improved , went for mesenteric angiogram , unable to stent SMA S/P 3 units of blood transfusion on trach. collar 40% Fi02, anxious to eat discuss possible decannulation,  H/H  are stable vascular  note appreciated no mediate plan for repeat mesenteric angiogram remain on trach.-collar at 40% FI02, RT IJ in place reassessed for stent in the SMA by vascular, no plan for now depresses and withdrawn   on meropenem and po vancomycin      (2021 16:57)    PAST MEDICAL & SURGICAL HISTORY:  CHF (congestive heart failure)    CAD (coronary artery disease)  Depression    Pleural effusion    History of 2019 novel coronavirus disease (COVID-19)  2020    Hemorrhoids    Bleeding hemorrhoids    Peripheral arterial disease    Claudication    BPH with urinary obstruction    ACC/AHA stage D systolic heart failure  Anticoagulation goal of INR 2.0 to 2.5    Falls    Clavicle fracture    CKD (chronic kidney disease), stage III    Iron deficiency anemia    H/O epistaxis    Vertigo    GI bleed    S/P TVR (tricuspid valve repair)    S/P ventricular assist device    S/P endoscopy    OBJECTIVE:  ICU Vital Signs Last 24 Hrs  T(C): 38.2 (2021 10:00), Max: 38.5 (2021 12:00)  T(F): 100.8 (2021 10:00), Max: 101.3 (2021 12:00)  HR: 65 (2021 10:00) (61 - 69)  BP: --  BP(mean): --  ABP: 105/67 (2021 10:00) (90/54 - 113/64)  ABP(mean): 77 (2021 10:00) (63 - 77)  RR: 20 (2021 10:00) (19 - 35)  SpO2: 99% (2021 10:00) (96% - 100%)       @ 07:01  -   @ 07:00  --------------------------------------------------------  IN: 2693.9 mL / OUT: 1415 mL / NET: 1278.9 mL     @ 07: @ 10:49  --------------------------------------------------------  IN: 420.8 mL / OUT: 115 mL / NET: 305.8 mL  PHYSICAL EXAM:Daily   Elderly male S/P tracheostomy on full ventilatory support  50% FI02 ,  Daily Weight in k.4 (2021 00:00)  HEENT:     + NCAT  + EOMI  - Conjuctival edema   - Icterus   - Thrush   - ETT  + NGT/OGT  Neck:         + FROM  RT IJ  line JVD  - Nodes - Masses + Mid-line trachea + Tracheostomy  Chest:            normal A-P diameter    Lungs:          + CTA   + Rhonchi    - Rales    - Wheezing + Decreased  LT BS   - Dullness R L  Cardiac:       + S1 + S2    + RRR   - Irregular   - S3  - S4    - Murmurs   - Rub   - Hamman’s sign   Abdomen:    + BS  + Soft + Non-tender - Distended - Organomegaly - PEG .cholecystostomy tube in place  Extremities:   - Cyanosis U/L   - Clubbing  U/L  + LE/UE Edema   + Capillary refill    + Pulses   Neuro:        - Awake   -  Alert   - Confused   - Lethargic   + Sedated  + Generalized Weakness  Skin:        - Rashes    - Erythema   + Normal incisions   + IV sites intact          HOSPITAL MEDICATIONS: All medications reviewed and analyzed  MEDICATIONS  (STANDING):  amiodarone    Tablet 200 milliGRAM(s) Oral daily  chlorhexidine 0.12% Liquid 15 milliLiter(s) Oral Mucosa every 12 hours  chlorhexidine 2% Cloths 1 Application(s) Topical <User Schedule>  dexmedetomidine Infusion 0.5 MICROgram(s)/kG/Hr (9.81 mL/Hr) IV Continuous <Continuous>  dextrose 50% Injectable 50 milliLiter(s) IV Push every 15 minutes  heparin  Infusion 400 Unit(s)/Hr (12.5 mL/Hr) IV Continuous <Continuous>  Hydromorphone  Injectable 0.5 milliGRAM(s) IV Push once  insulin lispro (ADMELOG) corrective regimen sliding scale   SubCutaneous every 6 hours  pantoprazole  Injectable 40 milliGRAM(s) IV Push every 12 hours  piperacillin/tazobactam IVPB.. 3.375 Gram(s) IV Intermittent every 8 hours  propofol Infusion 20 MICROgram(s)/kG/Min (9.42 mL/Hr) IV Continuous <Continuous>  sodium chloride 0.9% lock flush 3 milliLiter(s) IV Push every 8 hours  sodium chloride 0.9%. 1000 milliLiter(s) (10 mL/Hr) IV Continuous <Continuous>    MEDICATIONS  (PRN):  acetaminophen    Suspension .. 650 milliGRAM(s) Oral every 6 hours PRN Temp greater or equal to 38C (100.4F)    LABS: All Lab data reviewed and analyzed                        9.5    22.89 )-----------( 225      ( 17 Sep 2021 00:51 )             30.1         132<L>  |  95<L>  |  14  ----------------------------<  119<H>  4.1   |  25  |  0.48<L>    Ca    9.4      17 Sep 2021 00:51  Phos  3.5     -17  Mg     1.8         TPro  7.3  /  Alb  2.5<L>  /  TBili  2.3<H>  /  DBili  x   /  AST  16  /  ALT  27  /  AlkPhos  234<H>        Ca    9.4      16 Sep 2021 01:02  Phos  2.8     -16  Mg     1.7     -    TPro  7.0  /  Alb  2.7<L>  /  TBili  2.0<H>  /  DBili  x   /  AST  16  /  ALT  35  /  AlkPhos  242<H>      Ca    9.6      15 Sep 2021 00:36  Phos  3.5     -15  Mg     1.8     -15    TPro  7.0  /  Alb  3.0<L>  /  TBili  2.4<H>  /  DBili  x   /  AST  23  /  ALT  52<H>  /  AlkPhos  258<H>  09-15      Ca    8.5      14 Sep 2021 00:33  Phos  2.6     09-14  Mg     1.6         TPro  6.0  /  Alb  2.8<L>  /  TBili  1.2  /  DBili  x   /  AST  36  /  ALT  60<H>  /  AlkPhos  215<H>        Ca    9.2      13 Sep 2021 01:03  Phos  3.0       Mg     1.7         TPro  6.5  /  Alb  2.8<L>  /  TBili  0.6  /  DBili  x   /  AST  33  /  ALT  75<H>  /  AlkPhos  201<H>                                                                                                                                            PTT - ( 2021 04:52 )  PTT:45.2 sec LIVER FUNCTIONS - ( 2021 00:42 )  Alb: 3.4 g/dL / Pro: 6.7 g/dL / ALK PHOS: 213 U/L / ALT: 15 U/L / AST: 24 U/L / GGT: x           RADIOLOGY: - Reviewed and analyzed RT Pig tail cathter  , LVAD HM2, CT scan of abdomen reviewed result noted

## 2021-09-17 NOTE — PROGRESS NOTE ADULT - ASSESSMENT
Assessment and Recommendation:   · Assessment	  Assessment and recommendation :  Recurrent Acute hypoxic respiratory Failure S/P tracheostomy on full ventilatory support  50% FI02,   Acute blood loss anemia S/P multiple  blood transfusion   going for mesenteric angiogram  S/P septic shock off vasopressin , levophed and off  Dobutamine   S/P cholecystostomy tube placement by IR    AF RVR back to regular sinus Rhythm   Non ischemic cardiomyopathy continue ACE inhibitor and B-Blockers   mesenteric ischemia failure to stent SMA , no plan for repeated trial   S/P 3 unit of blood transfusion   S/P Septic shock and cardiogenic shock   Stage D systolic heart failure S/P LVAD HM2   MH2 LVAD  with  TV Annuloplasty  Severe peripheral vascular disease   severe hyperglycemia on insulin coverage    on meropenem and po vancomycin   Reglan 10 mg for Gastric Motility   hyperthyroidism on Methimazole 10mg TID   critical care polyneuropathy   Anemia of Acute blood Loss   severe protein caloric malnutrition   S/P blood and FFP transfusion   Chronic kidney disease stage III  Depressed and withdrawn   on   NGT feeding on Vital  advanced to 55 cc/ hr   ? decannulation   GI prophylaxis with PPI   Discussed with TCV team and vascular

## 2021-09-17 NOTE — PROGRESS NOTE ADULT - ATTENDING COMMENTS
Agree with assessment and plan as above by Dr. Aceves. Reviewed all pertinent labs, glucose values, and imaging studies. Modifications made as indicated above. Awaiting Free T4 to determine dose adjustment to methimazole.    Billy Tipton D.O  [phone]

## 2021-09-17 NOTE — PROGRESS NOTE ADULT - PROBLEM SELECTOR PLAN 2
- Continue current speed of 9200 RPM. Speed increased this admission due to signs of inadequately unloaded left ventricle  - MAP is at goal at this time  - Holding coumadin and aspirin indefinitely given recurrent GI bleeding.   - LDH level remains elevated, currently 326. likely related to RP bleed. Remains on AC as stated above. Continue to monitor levels daily

## 2021-09-17 NOTE — PROGRESS NOTE ADULT - ASSESSMENT
65 YO M with a history of stage D NICM s/p HM2 on 9/2017 as DT (due to severe PAD) with TV ring, prior COVID-19 infection 4/2020, recurrent syncope post LVAD s/p ILR, and chronic abdominal pain with prior negative workup who was admitted 6/14/21 with symptomatic anemia with Hgb 4.5 in setting of INR 8.8 without hemodynamic instability and has since had a protracted hospitalization. He was transfused and underwent VCE which showed active bleeding in the mid small bowel but subsequent enteroscopy 6/15 did not reveal any active bleeding. He acutely decompensated after procedure with fever/hypertension, low flow alarms, and pulmonary infiltrate with hypoxia requiring intubation from probable aspiration PNA. He was unable to extubated and has since undergone tracheostomy but tolerating persistent trach collar and nearing ability for decannulation. 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 Short term goal is maintenance of adequate nutrition and eventual trach decannulation.     Underwent mesenteric angiogram on 9/10 with vascular surgery. Found to have SMA stenosis at its origin but unable to place stent. Intra-op received 3uPRBC and required fem stop placement for L pseudoaneurysm. Post-op continued to have drop in H/H requiring additional 2u PRBC (last on 9/12), CT angio revealed left-sided retroperitoneal hematoma. He remains febrile and continues to have persistent leukocytosis. Most recent sputum culture is positive for serratia marcescens, currently o meropenum. If leukocytosis remains elevated or if patient continues to have febrile episodes would obtain CT Scan.

## 2021-09-17 NOTE — PROGRESS NOTE ADULT - SUBJECTIVE AND OBJECTIVE BOX
Subjective: Patient seen and examined resting in bed. ON had low grade temp of 100.3.     Medications:  acetaminophen   Tablet .. 650 milliGRAM(s) Oral every 6 hours PRN  chlorhexidine 0.12% Liquid 15 milliLiter(s) Oral Mucosa two times a day  chlorhexidine 2% Cloths 1 Application(s) Topical <User Schedule>  dextrose 40% Gel 15 Gram(s) Oral once  dextrose 50% Injectable 25 Gram(s) IV Push once  dextrose 50% Injectable 12.5 Gram(s) IV Push once  glucagon  Injectable 1 milliGRAM(s) IntraMuscular once  insulin lispro (ADMELOG) corrective regimen sliding scale   SubCutaneous every 6 hours  lidocaine   4% Patch 1 Patch Transdermal daily  meropenem  IVPB 1000 milliGRAM(s) IV Intermittent every 8 hours  methimazole 15 milliGRAM(s) Oral <User Schedule>  metoclopramide Injectable 10 milliGRAM(s) IV Push every 8 hours  mirtazapine 7.5 milliGRAM(s) Oral daily  multivitamin 1 Tablet(s) Oral daily  ondansetron Injectable 4 milliGRAM(s) IV Push every 6 hours PRN  pantoprazole  Injectable 40 milliGRAM(s) IV Push every 12 hours  propranolol 40 milliGRAM(s) Oral every 8 hours  sertraline 100 milliGRAM(s) Oral daily  simethicone 80 milliGRAM(s) Chew every 8 hours PRN  sodium chloride 0.9% lock flush 10 milliLiter(s) IV Push every 1 hour PRN  sodium chloride 0.9%. 1000 milliLiter(s) IV Continuous <Continuous>  thiamine 100 milliGRAM(s) Oral daily  vancomycin    Solution 125 milliGRAM(s) Oral every 6 hours    ICU Vital Signs Last 24 Hrs  T(C): 37 (17 Sep 2021 12:00), Max: 37.9 (16 Sep 2021 16:00)  T(F): 98.6 (17 Sep 2021 12:00), Max: 100.3 (16 Sep 2021 16:00)  HR: 89 (17 Sep 2021 12:00) (87 - 114)  BP: --  BP(mean): --  ABP: 80/60 (17 Sep 2021 12:00) (70/62 - 105/68)  ABP(mean): 68 (17 Sep 2021 12:00) (65 - 82)  RR: 32 (17 Sep 2021 12:00) (20 - 54)  SpO2: 97% (17 Sep 2021 12:00) (94% - 100%)      Weight in k.4 ( @ 00:00)    I&O's Summary    16 Sep 2021 07:01  -  17 Sep 2021 07:00  --------------------------------------------------------  IN: 820 mL / OUT: 975 mL / NET: -155 mL    17 Sep 2021 07:01  -  17 Sep 2021 13:09  --------------------------------------------------------  IN: 230 mL / OUT: 0 mL / NET: 230 mL        Physical Exam  Appearance: No Acute Distress, thin-appearing, lethargic   HEENT: JVP non elevated  Cardiovascular: VAD hum  Respiratory: Clear to auscultation bilaterally  Gastrointestinal: mildly distended, tenderness in LLQ  Neurologic: Non-focal  Extremities: No LE edema, warm and well perfused  Lines/drains: perc dawn drain in place, brownish contents, no bloody contents    LVAD Interrogation  VAD TYPE HM 2  Speed 9200  Flow 5.7  Power 5.6  PI 5.2  Assessment of driveline exit site: driveline dressing c/d/i  Programming changes: no changes made    Labs:                        9.5    22.89 )-----------( 225      ( 17 Sep 2021 00:51 )             30.1         132<L>  |  95<L>  |  14  ----------------------------<  119<H>  4.1   |  25  |  0.48<L>    Ca    9.4      17 Sep 2021 00:51  Phos  3.5       Mg     1.8         TPro  7.3  /  Alb  2.5<L>  /  TBili  2.3<H>  /  DBili  x   /  AST  16  /  ALT  27  /  AlkPhos  234<H>                Lactate Dehydrogenase, Serum: 326 U/L ( @ 00:51)  Lactate Dehydrogenase, Serum: 314 U/L ( @ 01:02)  Lactate Dehydrogenase, Serum: 259 U/L (09-15 @ 00:36)

## 2021-09-17 NOTE — PROGRESS NOTE ADULT - ATTENDING COMMENTS
Recovering after vascular complication (pseudoaneurysm and RP bleed) during mesenteric angiogram last week. Still with pain. Tolerating tube feeds. Will consider repeat attempt at SMA stenting once he recovers from acute events of last week. Still having fevers, Meropenem started yesterday.  Follow up culture results. Low threshold to repeat CT Chest/Abdomen/Pelvis.

## 2021-09-17 NOTE — PROGRESS NOTE ADULT - ASSESSMENT
65 year old male with history of Stage D HF due to NICM on LVAD, TV annuloplasty ring 9/12/17 as destination therapy due to severe peripheral artery disease with significant stenosis  SIADH, Depression, CKD-3 with hyperkalemia, admitted 6/14/21 with symptomatic anemia with Hgb 4.5 in setting of INR 8.8, thereafter with complicated hospital course including VAP, serratia bacteremia with acalculous cholecystitis s/p percutaneous tube, abdominal pain s/p attempted SMA stent on 9/10/21, and now found to have L RP bleed.     Endocrine consulted for management of hyperthyroidism in the setting of recent amiodarone use and 2 IV contrast CTs with (7/28 and 8/13) and steroid induced hyperglycemia on tube feeds    #Hyperthyroidism likely 2/2 Amiodarone and contrast induced thyroiditis    Most likely Type 1 AIT given abrupt presentation after Amiodarone use and positive TPO Ab. Prior to initiation of Amiodarone on 6/14, TSH was normal (1.98). Nearly two months later, on 8/7 - TSH was < 0.01  s/p steroid taper    TFTs 9/17: TT3 downtrended to 92 (from 110). On MMI 15 mg daily    Recs  -Ideally, contrast imaging should be deferred until the patient is biochemically euthyroid as further iodine load can exacerbate thyrotoxicosis. However, if contrast imaging is required, risks of deferring the imaging need to be weighed against the risks of further contrast load in this setting.   -CT chest reviewed with radiology - no large or obvious nodules noted in thyroid, however hard to exclude smaller nodules due to presence of trach and artifact  -Decrease Methimazole to 10 mg daily.   -Recheck Total T3 and FT4 on 9/21  -Continue Propranolol 40 mg q 8 hrs. Goal HR 60-80.     #Steroid induced hyperglycemia   HbA1c 5.4%. FS at goal 100-180  -Continue only low dose correctional scale q 6 hrs       Sia Aceves MD  Endocrine Fellow  Pager: [phone]/TIMBO [phone]  Consults 9am-5pm: [phone]  After 5pm and weekends: [phone]     65 year old male with history of Stage D HF due to NICM on LVAD, TV annuloplasty ring 9/12/17 as destination therapy due to severe peripheral artery disease with significant stenosis  SIADH, Depression, CKD-3 with hyperkalemia, admitted 6/14/21 with symptomatic anemia with Hgb 4.5 in setting of INR 8.8, thereafter with complicated hospital course including VAP, serratia bacteremia with acalculous cholecystitis s/p percutaneous tube, abdominal pain s/p attempted SMA stent on 9/10/21, and now found to have L RP bleed.     Endocrine consulted for management of hyperthyroidism in the setting of recent amiodarone use and 2 IV contrast CTs with (7/28 and 8/13) and steroid induced hyperglycemia on tube feeds    #Hyperthyroidism likely 2/2 Amiodarone and contrast induced thyroiditis    Most likely Type 1 AIT given abrupt presentation after Amiodarone use and positive TPO Ab. Prior to initiation of Amiodarone on 6/14, TSH was normal (1.98). Nearly two months later, on 8/7 - TSH was < 0.01  s/p steroid taper    TFTs 9/17: TT3 downtrended to 92 (from 110). On MMI 15 mg daily    Recs  -Ideally, contrast imaging should be deferred until the patient is biochemically euthyroid as further iodine load can exacerbate thyrotoxicosis. However, if contrast imaging is required, risks of deferring the imaging need to be weighed against the risks of further contrast load in this setting.   -CT chest reviewed with radiology - no large or obvious nodules noted in thyroid, however hard to exclude smaller nodules due to presence of trach and artifact  -Continue Methimazole 15 mg daily  -Check Free T4 tomorrow morning   -Recheck Total T3 and FT4 on 9/21  -Continue Propranolol 40 mg q 8 hrs. Goal HR 60-80.     #Steroid induced hyperglycemia   HbA1c 5.4%. FS at goal 100-180  -Continue only low dose correctional scale q 6 hrs     Discussed with Dr. Tipton and primary team      Sia Aceves MD  Endocrine Fellow  Pager: [phone]/TIMBO [phone]  Consults 9am-5pm: [phone]  After 5pm and weekends: [phone]

## 2021-09-18 NOTE — PROGRESS NOTE ADULT - SUBJECTIVE AND OBJECTIVE BOX
CRITICAL CARE ATTENDING - CTICU    MEDICATIONS  (STANDING):  chlorhexidine 0.12% Liquid 15 milliLiter(s) Oral Mucosa two times a day  chlorhexidine 2% Cloths 1 Application(s) Topical <User Schedule>  dextrose 40% Gel 15 Gram(s) Oral once  dextrose 50% Injectable 25 Gram(s) IV Push once  dextrose 50% Injectable 12.5 Gram(s) IV Push once  glucagon  Injectable 1 milliGRAM(s) IntraMuscular once  insulin lispro (ADMELOG) corrective regimen sliding scale   SubCutaneous every 6 hours  lidocaine   4% Patch 1 Patch Transdermal daily  meropenem  IVPB 1000 milliGRAM(s) IV Intermittent every 8 hours  methimazole 15 milliGRAM(s) Oral <User Schedule>  metoclopramide Injectable 10 milliGRAM(s) IV Push every 8 hours  mirtazapine 7.5 milliGRAM(s) Oral daily  multivitamin 1 Tablet(s) Oral daily  pantoprazole  Injectable 40 milliGRAM(s) IV Push every 12 hours  propranolol 40 milliGRAM(s) Oral every 8 hours  sertraline 100 milliGRAM(s) Oral daily  sodium chloride 0.9%. 1000 milliLiter(s) (10 mL/Hr) IV Continuous <Continuous>  thiamine 100 milliGRAM(s) Oral daily  vancomycin    Solution 125 milliGRAM(s) Oral every 6 hours                                    9.2    18.14 )-----------( 257      ( 18 Sep 2021 01:27 )             29.7           135  |  98  |  13  ----------------------------<  139<H>  3.9   |  26  |  0.45<L>    Ca    9.5      18 Sep 2021 01:27  Phos  3.2       Mg     1.8         TPro  7.0  /  Alb  2.7<L>  /  TBili  1.2  /  DBili  x   /  AST  16  /  ALT  23  /  AlkPhos  239<H>                Daily     Daily Weight in k (18 Sep 2021 00:00)      17 @ 07:01  -   @ 07:00  --------------------------------------------------------  IN: 1075 mL / OUT: 1001 mL / NET: 74 mL     @ 07:01  -   @ 10:52  --------------------------------------------------------  IN: 105 mL / OUT: 0 mL / NET: 105 mL        Critically Ill patient  : [ ] preoperative ,   [x ] post operative    Requires :  [x] Arterial Line   [x ] Central Line  [ ] PA catheter  [ ] IABP  [ ] ECMO  [ ] LVAD  [x ] Ventilator  [ ] pacemaker [ ] Impella.                      [x ] ABG's     [ x] Pulse Oxymetry Monitoring  Bedside evaluation , monitoring , treatment of hemodynamics , fluids , IVP/ IVCD meds.        Diagnosis:     6/15 - EGD: post, c/f aspiration/ sepsis w/ L opacity, re-intubated     - Shiley trach     9/10 Failed SMA stent c/b RP bleed, sp 3U PRBC     CTA Abd/Pelvis L RP hematoma     CAD    Dyslipidemia      Requires chest PT, pulmonary toilet, ambu bagging, suctioning to maintain SaO2,  patent airway and treat atelectasis.     Hypovolemia     Ventilator Management:  [ ]AC-rest    [ ]CPAP-PS Wean    [x ]Trach Collar     [ ]Extubate    [ ] T-Piece  [ ]peep>5     Tolerates NG / NJ feeds at [ x] goal rate    [ ] trophic rate    [ ]       rate     Requires bedside physical therapy, mobilization and total shelter care.         By signing my name below, I, Aparna Saleh, attest that this documentation has been prepared under the direction and in the presence of Luis Fernando Thompson MD.   Electronically Signed: Cesia Villegas 21 @ 10:52      Discussed with CT surgeon, Physician Assistant - Nurse Practitioner- Critical care medicine team.   Discussed at  AM / PM rounds.   Chart, labs , films reviewed.    Cumulative Critical Care Time Given Today:  CRITICAL CARE ATTENDING - CTICU    MEDICATIONS  (STANDING):  chlorhexidine 0.12% Liquid 15 milliLiter(s) Oral Mucosa two times a day  chlorhexidine 2% Cloths 1 Application(s) Topical <User Schedule>  dextrose 40% Gel 15 Gram(s) Oral once  dextrose 50% Injectable 25 Gram(s) IV Push once  dextrose 50% Injectable 12.5 Gram(s) IV Push once  glucagon  Injectable 1 milliGRAM(s) IntraMuscular once  insulin lispro (ADMELOG) corrective regimen sliding scale   SubCutaneous every 6 hours  lidocaine   4% Patch 1 Patch Transdermal daily  meropenem  IVPB 1000 milliGRAM(s) IV Intermittent every 8 hours  methimazole 15 milliGRAM(s) Oral <User Schedule>  metoclopramide Injectable 10 milliGRAM(s) IV Push every 8 hours  mirtazapine 7.5 milliGRAM(s) Oral daily  multivitamin 1 Tablet(s) Oral daily  pantoprazole  Injectable 40 milliGRAM(s) IV Push every 12 hours  propranolol 40 milliGRAM(s) Oral every 8 hours  sertraline 100 milliGRAM(s) Oral daily  sodium chloride 0.9%. 1000 milliLiter(s) (10 mL/Hr) IV Continuous <Continuous>  thiamine 100 milliGRAM(s) Oral daily  vancomycin    Solution 125 milliGRAM(s) Oral every 6 hours                                    9.2    18.14 )-----------( 257      ( 18 Sep 2021 01:27 )             29.7           135  |  98  |  13  ----------------------------<  139<H>  3.9   |  26  |  0.45<L>    Ca    9.5      18 Sep 2021 01:27  Phos  3.2       Mg     1.8         TPro  7.0  /  Alb  2.7<L>  /  TBili  1.2  /  DBili  x   /  AST  16  /  ALT  23  /  AlkPhos  239<H>                Daily     Daily Weight in k (18 Sep 2021 00:00)      17 @ 07:01  -   @ 07:00  --------------------------------------------------------  IN: 1075 mL / OUT: 1001 mL / NET: 74 mL     @ 07:01  -   @ 10:52  --------------------------------------------------------  IN: 105 mL / OUT: 0 mL / NET: 105 mL        Critically Ill patient  : [ ] preoperative ,   [x ] post operative    Requires :  [x] Arterial Line   [x ] Central Line  [ ] PA catheter  [ ] IABP  [ ] ECMO  [ ] LVAD  [x ] Ventilator  [ ] pacemaker [ ] Impella.                      [x ] ABG's     [ x] Pulse Oxymetry Monitoring  Bedside evaluation , monitoring , treatment of hemodynamics , fluids , IVP/ IVCD meds.        Diagnosis:     6/15 - EGD: post, c/f aspiration/ sepsis w/ L opacity, re-intubated     - Shiley trach     9/10 Failed SMA stent c/b RP bleed, sp 3U PRBC     CTA Abd/Pelvis L RP hematoma     CKD     BPH    Depression      Requires chest PT, pulmonary toilet, ambu bagging, suctioning to maintain SaO2,  patent airway and treat atelectasis.     Hypovolemia     Ventilator Management:  [ ]AC-rest    [ ]CPAP-PS Wean    [x ]Trach Collar     [ ]Extubate    [ ] T-Piece  [ ]peep>5     Tolerates NG / NJ feeds at [ x] goal rate    [ ] trophic rate    [ ]       rate     Requires bedside physical therapy, mobilization and total assisted care.           By signing my name below, I, Aparna Saleh, attest that this documentation has been prepared under the direction and in the presence of Luis Fernando Thompson MD.   Electronically Signed: Cesia Villegas 21 @ 10:52      Discussed with CT surgeon, Physician Assistant - Nurse Practitioner- Critical care medicine team.   Discussed at  AM / PM rounds.   Chart, labs , films reviewed.    Cumulative Critical Care Time Given Today:  CRITICAL CARE ATTENDING - CTICU    MEDICATIONS  (STANDING):  chlorhexidine 0.12% Liquid 15 milliLiter(s) Oral Mucosa two times a day  chlorhexidine 2% Cloths 1 Application(s) Topical <User Schedule>  dextrose 40% Gel 15 Gram(s) Oral once  dextrose 50% Injectable 25 Gram(s) IV Push once  dextrose 50% Injectable 12.5 Gram(s) IV Push once  glucagon  Injectable 1 milliGRAM(s) IntraMuscular once  insulin lispro (ADMELOG) corrective regimen sliding scale   SubCutaneous every 6 hours  lidocaine   4% Patch 1 Patch Transdermal daily  meropenem  IVPB 1000 milliGRAM(s) IV Intermittent every 8 hours  methimazole 15 milliGRAM(s) Oral <User Schedule>  metoclopramide Injectable 10 milliGRAM(s) IV Push every 8 hours  mirtazapine 7.5 milliGRAM(s) Oral daily  multivitamin 1 Tablet(s) Oral daily  pantoprazole  Injectable 40 milliGRAM(s) IV Push every 12 hours  propranolol 40 milliGRAM(s) Oral every 8 hours  sertraline 100 milliGRAM(s) Oral daily  sodium chloride 0.9%. 1000 milliLiter(s) (10 mL/Hr) IV Continuous <Continuous>  thiamine 100 milliGRAM(s) Oral daily  vancomycin    Solution 125 milliGRAM(s) Oral every 6 hours                                    9.2    18.14 )-----------( 257      ( 18 Sep 2021 01:27 )             29.7           135  |  98  |  13  ----------------------------<  139<H>  3.9   |  26  |  0.45<L>    Ca    9.5      18 Sep 2021 01:27  Phos  3.2       Mg     1.8         TPro  7.0  /  Alb  2.7<L>  /  TBili  1.2  /  DBili  x   /  AST  16  /  ALT  23  /  AlkPhos  239<H>                Daily     Daily Weight in k (18 Sep 2021 00:00)      17 @ 07:01  -   @ 07:00  --------------------------------------------------------  IN: 1075 mL / OUT: 1001 mL / NET: 74 mL     @ 07:01  -   @ 10:52  --------------------------------------------------------  IN: 105 mL / OUT: 0 mL / NET: 105 mL        Critically Ill patient  : [ ] preoperative ,   [x ] post operative    Requires :  [x] Arterial Line   [x ] Central Line  [ ] PA catheter  [ ] IABP  [ ] ECMO  [x ] LVAD  [x ] Ventilator  [ ] pacemaker [ ] Impella.                      [x ] ABG's     [ x] Pulse Oxymetry Monitoring  Bedside evaluation , monitoring , treatment of hemodynamics , fluids , IVP/ IVCD meds.        Diagnosis:     LVAD - re admitted for sepsis / GI Bleeding    6/15 - EGD: post, c/f aspiration/ sepsis w/ L opacity, re-intubated     - Shiley trach     9/10 Failed SMA stent c/b RP bleed, sp 3U PRBC     CTA Abd/Pelvis L RP hematoma     CKD    CHF- acute [x]   chronic [ x]    systolic [x ]   diatolic [ ]          - Echo- EF -             [ ] RV dysfunction          - Cxr-cardiomegally, edema          - Clinical-  [ ]inotropes   [ ]pressors   [x ]diuresis   [ ]IABP   [ ]ECMO   [x ]LVAD   x[ ]Respiratory Failu       -     respiratory failure       Requires chest PT, pulmonary toilet, ambu bagging, suctioning to maintain SaO2,  patent airway and treat atelectasis.     Hypovolemia     Ventilator Management:  [ ]AC-rest    [x ]CPAP-PS Wean / rest    [x ]Trach Collar     [ ]Extubate    [ ] T-Piece  [ ]peep>5     Difficult weaning process - multiple organ system involvement in critically ill patient     Tolerates NG / NJ feeds at [ ] goal rate    [ ] trophic rate    [x ]  1/2     rate     Requires bedside physical therapy, mobilization and total [facility] care.     serratia Pneumonia - R           By signing my name below, I, Aparna Saleh, attest that this documentation has been prepared under the direction and in the presence of Luis Fernando Donna, MD.   Electronically Signed: Cesia Villegas 21 @ 10:52    I, Luis Fernando Thompson, personally performed the services described in this documentation. All medical record entries made by the scribe were at my direction and in my presence. I have reviewed the chart and agree that the record reflects my personal performance and is accurate and complete.   Luis Fernando Thompson MD.       Discussed with CT surgeon, Physician Assistant - Nurse Practitioner- Critical care medicine team.   Discussed at  AM / PM rounds.   Chart, labs , films reviewed.    Cumulative Critical Care Time Given Today:  30 min

## 2021-09-18 NOTE — PROGRESS NOTE ADULT - SUBJECTIVE AND OBJECTIVE BOX
RICKY JOINT  MRN#: [id number]  Subjective:  Pulmonary progress  : recurrent Acute hypoxic respiratory Failure ,aspiration pneumonia, NICM  , chart reviewed and H/O obtained radiological and Laboratory study reviewed patient Examined     65M PMH ACC/AHA stage D HF due to NICM HM2 LVAD , TV annuloplasty ring 17 as destination therapy due to severe peripheral artery disease with significant stenosis  SIADH, Depression, CKD-3 with hyperkalemia, past E. coli UTIs, driveline drainage (21) and COVID-19 (back in 2020)  He was recently seen in clinic where he complained of abdominal pain and dark stools w constipation back in May. He presents to [facility] ER today weakness and fatigue, moderate and + Black stools for three days, on coumadin secondary to warfarin use in the setting of an LVAD. Patient has required transfusions for GIB in the past. Mostly recently back in 2021 pt had anemia with dark stools. No interventions was done at that time. However Last Endoscopy was done in 2020 (negative). Today labs show patient is anemic with H/H of 4.5/16.3,. INR is 8.84 MAP in the 90s, Temp 35.1. He denies any chest pain, shortness of breath, dizziness, abd pain, nausea or vomiting. found to have  rectal bleeding underwent endoscopy ,old blood in the proximal ileum ,  develop sepsis with LL opacity given Antibiotics , Extubated , reintubated , Bronchoscopy on Zosyn for LL pneumonia  and Amiodarone S/P TV Annuloplasty , patient remain intubated on full ventilatory support .S/P multiple units of blood transfusion , remain on full ventilatory support on Precedex and propofol , new central IJ line , diarrhea C diff. +ve on po vancomycin and IV Flagyl,  mildly distended belly , fever start on cefepime 2gm q 8 hrs S/P tracheostomy .  new RT Subclavian central line continue on contact  isolation ,still diarrhea on C-diff antibiotics ENT follow up appreciated , trial of C-PAP as tolerated , , copious secretion from trach. site chest x ray left lower lobe pneumonia , tolerating trch. collar 50% FI02 still excessive secretion need pulmonary toilet , off Ancef antibiotic , no more diarrhea back on full support mechanical ventilator , chest x ray show improvement in LLL air space disease, more awake and responsive on tube feeding no more diarrhea , S/P  Ancef for bacteremia no fever ,ID follow up noted ,  no nausea or vomiting or diarrhea still very weak and tired , event note pulled NG tube now replaced , back on tube feeding ,still on po vancomycin , getting PT and OT at bed side , no plan for decannulation for now. , no more diarrhea receiving PT and OPT at bed side , minimal secretion from tracheostomy site , no SOB getting stronger , improve muscle tone patient transfer to monitor bed still on contact isolation for C-Difficel colitis on 50% FI02, NG tube clogged , and change to resume tube feeding still loose stool . H/H drop significantly require blood transfusion , most likely GI bleeding , IV heparin D/C , vomiting 200 cc of creamy color tube feeding on hold no sob, still melena monitor in the CTU possible capsule endoscopy , H/H is stable ., patient develop TR sided  pneumothorax require chest tube placement , RT IJ central line  placed , develop fever shaking chills , blood culture positive for serratia marcescens , start on cefepime .the patient  become hypoxic and hypotensive placed on full ventilatory support and Vasopressin , levophed and Dobutamine ,S/P blood transfusion on meropenem and vancomycin ,   , on and  off pressors , occasional agitation on Precedex .S/P IR cholecystostomy tube drainage placement in the RT upper Quadrant , resume anticoagulation chest x ray noted C-PAP trail lasted only for 2 hrs , new RT SC line and D/C RT IJ line , RT pig tail cathter has been removed , tolerating C-PAP trial placed on trach. collar 50% FI02 GI consultant noted on NG tube feeding as tolerated , develop AF RVR S/P  bolus Amiodarone  back to regular sinus Rhythm , Flat Affect depressed , back on tube feeding Vital AF at 60 cc/ hr .still intermittent abdominal pain , no fever saturation is accepted  back on full ventilatory support , tired and sleepy on round    .start  back on  tube feeding . tolerating it very well , no nausea or vomiting , good 02 saturation on trac-collar on methimazole for hyperthyroidism , no new C/O no plan for decannulation yet , electrolyte blood test is still pending .on respiratory isolation sputum culture is negative , increase WBC  improved on cefepime , sputum positive for enterococcus , condition is the same ,still C/O Abdominal pain , white count is improving , no chest pain or SOB ,  .placed on Reglan 10 mg TID for gastric motility , depressed , withdrawn. on full NG tube diet with Vital , secretion are much improved , went for mesenteric angiogram , unable to stent SMA S/P 3 units of blood transfusion on trach. collar 40% Fi02, anxious to eat discuss possible decannulation,  H/H  are stable vascular  note appreciated no mediate plan for repeat mesenteric angiogram remain on trach.-collar at 40% FI02, RT IJ in place reassessed for stent in the SMA by vascular, no plan for now depresses and withdrawn on meropenem and po vancomycin for elevated white count pending culture no plan for further vascular study or decannulation       (2021 16:57)    PAST MEDICAL & SURGICAL HISTORY:  CHF (congestive heart failure)    CAD (coronary artery disease)  Depression    Pleural effusion    History of 2019 novel coronavirus disease (COVID-19)  2020    Hemorrhoids    Bleeding hemorrhoids    Peripheral arterial disease    Claudication    BPH with urinary obstruction    ACC/AHA stage D systolic heart failure  Anticoagulation goal of INR 2.0 to 2.5    Falls    Clavicle fracture    CKD (chronic kidney disease), stage III    Iron deficiency anemia    H/O epistaxis    Vertigo    GI bleed    S/P TVR (tricuspid valve repair)    S/P ventricular assist device    S/P endoscopy    OBJECTIVE:  ICU Vital Signs Last 24 Hrs  T(C): 38.2 (2021 10:00), Max: 38.5 (2021 12:00)  T(F): 100.8 (2021 10:00), Max: 101.3 (2021 12:00)  HR: 65 (2021 10:00) (61 - 69)  BP: --  BP(mean): --  ABP: 105/67 (2021 10:00) (90/54 - 113/64)  ABP(mean): 77 (2021 10:00) (63 - 77)  RR: 20 (2021 10:00) (19 - 35)  SpO2: 99% (2021 10:00) (96% - 100%)      -19 @ 07:01  -  06-20 @ 07:00  --------------------------------------------------------  IN: 2693.9 mL / OUT: 1415 mL / NET: 1278.9 mL     @ 07:01   @ 10:49  --------------------------------------------------------  IN: 420.8 mL / OUT: 115 mL / NET: 305.8 mL  PHYSICAL EXAM:Daily   Elderly male S/P tracheostomy on full ventilatory support  50% FI02 ,  Daily Weight in k.4 (2021 00:00)  HEENT:     + NCAT  + EOMI  - Conjuctival edema   - Icterus   - Thrush   - ETT  + NGT/OGT  Neck:         + FROM  RT IJ  line JVD  - Nodes - Masses + Mid-line trachea + Tracheostomy  Chest:            normal A-P diameter    Lungs:          + CTA   + Rhonchi    - Rales    - Wheezing + Decreased  LT BS   - Dullness R L  Cardiac:       + S1 + S2    + RRR   - Irregular   - S3  - S4    - Murmurs   - Rub   - Hamman’s sign   Abdomen:    + BS  + Soft + Non-tender - Distended - Organomegaly - PEG .cholecystostomy tube in place  Extremities:   - Cyanosis U/L   - Clubbing  U/L  + LE/UE Edema   + Capillary refill    + Pulses   Neuro:        - Awake   -  Alert   - Confused   - Lethargic   + Sedated  + Generalized Weakness  Skin:        - Rashes    - Erythema   + Normal incisions   + IV sites intact          HOSPITAL MEDICATIONS: All medications reviewed and analyzed  MEDICATIONS  (STANDING):  amiodarone    Tablet 200 milliGRAM(s) Oral daily  chlorhexidine 0.12% Liquid 15 milliLiter(s) Oral Mucosa every 12 hours  chlorhexidine 2% Cloths 1 Application(s) Topical <User Schedule>  dexmedetomidine Infusion 0.5 MICROgram(s)/kG/Hr (9.81 mL/Hr) IV Continuous <Continuous>  dextrose 50% Injectable 50 milliLiter(s) IV Push every 15 minutes  heparin  Infusion 400 Unit(s)/Hr (12.5 mL/Hr) IV Continuous <Continuous>  Hydromorphone  Injectable 0.5 milliGRAM(s) IV Push once  insulin lispro (ADMELOG) corrective regimen sliding scale   SubCutaneous every 6 hours  pantoprazole  Injectable 40 milliGRAM(s) IV Push every 12 hours  piperacillin/tazobactam IVPB.. 3.375 Gram(s) IV Intermittent every 8 hours  propofol Infusion 20 MICROgram(s)/kG/Min (9.42 mL/Hr) IV Continuous <Continuous>  sodium chloride 0.9% lock flush 3 milliLiter(s) IV Push every 8 hours  sodium chloride 0.9%. 1000 milliLiter(s) (10 mL/Hr) IV Continuous <Continuous>    MEDICATIONS  (PRN):  acetaminophen    Suspension .. 650 milliGRAM(s) Oral every 6 hours PRN Temp greater or equal to 38C (100.4F)    LABS: All Lab data reviewed and analyzed                        9.2    18.14 )-----------( 257      ( 18 Sep 2021 01:27 )             29.7   -    135  |  98  |  13  ----------------------------<  139<H>  3.9   |  26  |  0.45<L>    Ca    9.5      18 Sep 2021 01:27  Phos  3.2     09-18  Mg     1.8     -    TPro  7.0  /  Alb  2.7<L>  /  TBili  1.2  /  DBili  x   /  AST  16  /  ALT  23  /  AlkPhos  239<H>      Ca    9.4      17 Sep 2021 00:51  Phos  3.5     09-17  Mg     1.8     -    TPro  7.3  /  Alb  2.5<L>  /  TBili  2.3<H>  /  DBili  x   /  AST  16  /  ALT  27  /  AlkPhos  234<H>        Ca    9.4      16 Sep 2021 01:02  Phos  2.8     09-16  Mg     1.7     -    TPro  7.0  /  Alb  2.7<L>  /  TBili  2.0<H>  /  DBili  x   /  AST  16  /  ALT  35  /  AlkPhos  242<H>      Ca    9.6      15 Sep 2021 00:36  Phos  3.5     09-15  Mg     1.8     -15    TPro  7.0  /  Alb  3.0<L>  /  TBili  2.4<H>  /  DBili  x   /  AST  23  /  ALT  52<H>  /  AlkPhos  258<H>  09-15      Ca    8.5      14 Sep 2021 00:33  Phos  2.6       Mg     1.6         TPro  6.0  /  Alb  2.8<L>  /  TBili  1.2  /  DBili  x   /  AST  36  /  ALT  60<H>  /  AlkPhos  215<H>        Ca    9.2      13 Sep 2021 01:03  Phos  3.0       Mg     1.7         TPro  6.5  /  Alb  2.8<L>  /  TBili  0.6  /  DBili  x   /  AST  33  /  ALT  75<H>  /  AlkPhos  201<H>                                                                                                                                            PTT - ( 2021 04:52 )  PTT:45.2 sec LIVER FUNCTIONS - ( 2021 00:42 )  Alb: 3.4 g/dL / Pro: 6.7 g/dL / ALK PHOS: 213 U/L / ALT: 15 U/L / AST: 24 U/L / GGT: x           RADIOLOGY: - Reviewed and analyzed RT Pig tail cathter  , LVAD HM2, CT scan of abdomen reviewed result noted

## 2021-09-19 NOTE — PROGRESS NOTE ADULT - SUBJECTIVE AND OBJECTIVE BOX
RICKY JOINT  MRN#: [id number]  Subjective:  Pulmonary progress  : recurrent Acute hypoxic respiratory Failure ,aspiration pneumonia, NICM  , chart reviewed and H/O obtained radiological and Laboratory study reviewed patient Examined     65M PMH ACC/AHA stage D HF due to NICM HM2 LVAD , TV annuloplasty ring 17 as destination therapy due to severe peripheral artery disease with significant stenosis  SIADH, Depression, CKD-3 with hyperkalemia, past E. coli UTIs, driveline drainage (21) and COVID-19 (back in 2020)  He was recently seen in clinic where he complained of abdominal pain and dark stools w constipation back in May. He presents to [facility] today weakness and fatigue, moderate and + Black stools for three days, on coumadin secondary to warfarin use in the setting of an LVAD. Patient has required transfusions for GIB in the past. Mostly recently back in 2021 pt had anemia with dark stools. No interventions was done at that time. However Last Endoscopy was done in 2020 (negative). Today labs show patient is anemic with H/H of 4.5/16.3,. INR is 8.84 MAP in the 90s, Temp 35.1. He denies any chest pain, shortness of breath, dizziness, abd pain, nausea or vomiting. found to have  rectal bleeding underwent endoscopy ,old blood in the proximal ileum ,  develop sepsis with LL opacity given Antibiotics , Extubated , reintubated , Bronchoscopy on Zosyn for LL pneumonia  and Amiodarone S/P TV Annuloplasty , patient remain intubated on full ventilatory support .S/P multiple units of blood transfusion , remain on full ventilatory support on Precedex and propofol , new central IJ line , diarrhea C diff. +ve on po vancomycin and IV Flagyl,  mildly distended belly , fever start on cefepime 2gm q 8 hrs S/P tracheostomy .  new RT Subclavian central line continue on contact  isolation ,still diarrhea on C-diff antibiotics ENT follow up appreciated , trial of C-PAP as tolerated , , copious secretion from trach. site chest x ray left lower lobe pneumonia , tolerating trch. collar 50% FI02 still excessive secretion need pulmonary toilet , off Ancef antibiotic , no more diarrhea back on full support mechanical ventilator , chest x ray show improvement in LLL air space disease, more awake and responsive on tube feeding no more diarrhea , S/P  Ancef for bacteremia no fever ,ID follow up noted ,  no nausea or vomiting or diarrhea still very weak and tired , event note pulled NG tube now replaced , back on tube feeding ,still on po vancomycin , getting PT and OT at bed side , no plan for decannulation for now. , no more diarrhea receiving PT and OPT at bed side , minimal secretion from tracheostomy site , no SOB getting stronger , improve muscle tone patient transfer to monitor bed still on contact isolation for C-Difficel colitis on 50% FI02, NG tube clogged , and change to resume tube feeding still loose stool . H/H drop significantly require blood transfusion , most likely GI bleeding , IV heparin D/C , vomiting 200 cc of creamy color tube feeding on hold no sob, still melena monitor in the CTU possible capsule endoscopy , H/H is stable ., patient develop TR sided  pneumothorax require chest tube placement , RT IJ central line  placed , develop fever shaking chills , blood culture positive for serratia marcescens , start on cefepime .the patient  become hypoxic and hypotensive placed on full ventilatory support and Vasopressin , levophed and Dobutamine ,S/P blood transfusion on meropenem and vancomycin ,   , on and  off pressors , occasional agitation on Precedex .S/P IR cholecystostomy tube drainage placement in the RT upper Quadrant , resume anticoagulation chest x ray noted C-PAP trail lasted only for 2 hrs , new RT SC line and D/C RT IJ line , RT pig tail cathter has been removed , tolerating C-PAP trial placed on trach. collar 50% FI02 GI consultant noted on NG tube feeding as tolerated , develop AF RVR S/P  bolus Amiodarone  back to regular sinus Rhythm , Flat Affect depressed , back on tube feeding Vital AF at 60 cc/ hr .still intermittent abdominal pain , no fever saturation is accepted  back on full ventilatory support , tired and sleepy on round    .start  back on  tube feeding . tolerating it very well , no nausea or vomiting , good 02 saturation on trac-collar on methimazole for hyperthyroidism , no new C/O no plan for decannulation yet , electrolyte blood test is still pending .on respiratory isolation sputum culture is negative , increase WBC  improved on cefepime , sputum positive for enterococcus , condition is the same ,still C/O Abdominal pain , white count is improving , no chest pain or SOB ,  .placed on Reglan 10 mg TID for gastric motility , depressed , withdrawn. on full NG tube diet with Vital , secretion are much improved , went for mesenteric angiogram , unable to stent SMA S/P 3 units of blood transfusion on trach. collar 40% Fi02, anxious to eat discuss possible decannulation,  H/H  are stable vascular  note appreciated no mediate plan for repeat mesenteric angiogram remain on trach.-collar at 40% FI02, RT IJ in place reassessed for stent in the SMA by vascular, no plan for now depresses and withdrawn on meropenem and po vancomycin for elevated white count pending culture no plan for further vascular study or decannulation  , no events overnight , white count is trending down      (2021 16:57)    PAST MEDICAL & SURGICAL HISTORY:  CHF (congestive heart failure)    CAD (coronary artery disease)  Depression    Pleural effusion    History of 2019 novel coronavirus disease (COVID-19)  2020    Hemorrhoids    Bleeding hemorrhoids    Peripheral arterial disease    Claudication    BPH with urinary obstruction    ACC/AHA stage D systolic heart failure  Anticoagulation goal of INR 2.0 to 2.5    Falls    Clavicle fracture    CKD (chronic kidney disease), stage III    Iron deficiency anemia    H/O epistaxis    Vertigo    GI bleed    S/P TVR (tricuspid valve repair)    S/P ventricular assist device    S/P endoscopy    OBJECTIVE:  ICU Vital Signs Last 24 Hrs  T(C): 38.2 (2021 10:00), Max: 38.5 (2021 12:00)  T(F): 100.8 (2021 10:00), Max: 101.3 (2021 12:00)  HR: 65 (2021 10:00) (61 - 69)  BP: --  BP(mean): --  ABP: 105/67 (2021 10:00) (90/54 - 113/64)  ABP(mean): 77 (2021 10:00) (63 - 77)  RR: 20 (2021 10:00) (19 - 35)  SpO2: 99% (2021 10:00) (96% - 100%)      -19 @ 07:01  -  06-20 @ 07:00  --------------------------------------------------------  IN: 2693.9 mL / OUT: 1415 mL / NET: 1278.9 mL     @ 07: @ 10:49  --------------------------------------------------------  IN: 420.8 mL / OUT: 115 mL / NET: 305.8 mL  PHYSICAL EXAM:Daily   Elderly male S/P tracheostomy on full ventilatory support  50% FI02 ,  Daily Weight in k.4 (2021 00:00)  HEENT:     + NCAT  + EOMI  - Conjuctival edema   - Icterus   - Thrush   - ETT  + NGT/OGT  Neck:         + FROM  RT IJ  line JVD  - Nodes - Masses + Mid-line trachea + Tracheostomy  Chest:            normal A-P diameter    Lungs:          + CTA   + Rhonchi    - Rales    - Wheezing + Decreased  LT BS   - Dullness R L  Cardiac:       + S1 + S2    + RRR   - Irregular   - S3  - S4    - Murmurs   - Rub   - Hamman’s sign   Abdomen:    + BS  + Soft + Non-tender - Distended - Organomegaly - PEG .cholecystostomy tube in place  Extremities:   - Cyanosis U/L   - Clubbing  U/L  + LE/UE Edema   + Capillary refill    + Pulses   Neuro:        - Awake   -  Alert   - Confused   - Lethargic   + Sedated  + Generalized Weakness  Skin:        - Rashes    - Erythema   + Normal incisions   + IV sites intact          HOSPITAL MEDICATIONS: All medications reviewed and analyzed  MEDICATIONS  (STANDING):  amiodarone    Tablet 200 milliGRAM(s) Oral daily  chlorhexidine 0.12% Liquid 15 milliLiter(s) Oral Mucosa every 12 hours  chlorhexidine 2% Cloths 1 Application(s) Topical <User Schedule>  dexmedetomidine Infusion 0.5 MICROgram(s)/kG/Hr (9.81 mL/Hr) IV Continuous <Continuous>  dextrose 50% Injectable 50 milliLiter(s) IV Push every 15 minutes  heparin  Infusion 400 Unit(s)/Hr (12.5 mL/Hr) IV Continuous <Continuous>  Hydromorphone  Injectable 0.5 milliGRAM(s) IV Push once  insulin lispro (ADMELOG) corrective regimen sliding scale   SubCutaneous every 6 hours  pantoprazole  Injectable 40 milliGRAM(s) IV Push every 12 hours  piperacillin/tazobactam IVPB.. 3.375 Gram(s) IV Intermittent every 8 hours  propofol Infusion 20 MICROgram(s)/kG/Min (9.42 mL/Hr) IV Continuous <Continuous>  sodium chloride 0.9% lock flush 3 milliLiter(s) IV Push every 8 hours  sodium chloride 0.9%. 1000 milliLiter(s) (10 mL/Hr) IV Continuous <Continuous>    MEDICATIONS  (PRN):  acetaminophen    Suspension .. 650 milliGRAM(s) Oral every 6 hours PRN Temp greater or equal to 38C (100.4F)    LABS: All Lab data reviewed and analyzed                         . )-----------( 303      ( 19 Sep 2021 00:34 )             31.8                   134<L>  |  98  |  12  ----------------------------<  127<H>  4.0   |  25  |  0.43<L>    Ca    9.5      19 Sep 2021 00:25  Phos  2.9     -  Mg     1.7         TPro  7.3  /  Alb  2.8<L>  /  TBili  1.1  /  DBili  x   /  AST  19  /  ALT  22  /  AlkPhos  252<H>      Ca    9.5      18 Sep 2021 01:27  Phos  3.2     -  Mg     1.8         TPro  7.0  /  Alb  2.7<L>  /  TBili  1.2  /  DBili  x   /  AST  16  /  ALT  23  /  AlkPhos  239<H>      Ca    9.4      17 Sep 2021 00:51  Phos  3.5     -  Mg     1.8         TPro  7.3  /  Alb  2.5<L>  /  TBili  2.3<H>  /  DBili  x   /  AST  16  /  ALT  27  /  AlkPhos  234<H>        Ca    9.4      16 Sep 2021 01:02  Phos  2.8     -  Mg     1.7         TPro  7.0  /  Alb  2.7<L>  /  TBili  2.0<H>  /  DBili  x   /  AST  16  /  ALT  35  /  AlkPhos  242<H>      Ca    9.6      15 Sep 2021 00:36  Phos  3.5     09-15  Mg     1.8     09-15    TPro  7.0  /  Alb  3.0<L>  /  TBili  2.4<H>  /  DBili  x   /  AST  23  /  ALT  52<H>  /  AlkPhos  258<H>  09-15      Ca    8.5      14 Sep 2021 00:33  Phos  2.6       Mg     1.6         TPro  6.0  /  Alb  2.8<L>  /  TBili  1.2  /  DBili  x   /  AST  36  /  ALT  60<H>  /  AlkPhos  215<H>        Ca    9.2      13 Sep 2021 01:03  Phos  3.0       Mg     1.7         TPro  6.5  /  Alb  2.8<L>  /  TBili  0.6  /  DBili  x   /  AST  33  /  ALT  75<H>  /  AlkPhos  201<H>                                                                                                                                            PTT - ( 2021 04:52 )  PTT:45.2 sec LIVER FUNCTIONS - ( 2021 00:42 )  Alb: 3.4 g/dL / Pro: 6.7 g/dL / ALK PHOS: 213 U/L / ALT: 15 U/L / AST: 24 U/L / GGT: x           RADIOLOGY: - Reviewed and analyzed RT Pig tail cathter  , LVAD HM2, CT scan of abdomen reviewed result noted

## 2021-09-19 NOTE — PROGRESS NOTE ADULT - ASSESSMENT
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 coli UTIs, driveline drainage (1/7/21) and COVID-19, here initially for GIB prolonged hospital course, s/p tracheostomy, acalculous cholecystitis s/p perc dawn. Patient has persistent intermittent abdominal pain, CTA showing possible proximal SMA stenosis. CTA poor quality limited by significant streak artifact. Mesenteric duplex velocities without evidence of significant stenosis, however study unreliable in the setting of patient's augmented systolic function given LVAD. Now s/p diagnostic mesenteric angiogram with unsuccessful SMA stenting. 09/12 CTA obtained due to downtrending H/H with evidence of L RP hematoma without evidence of active extravasation, L groin wound stable. H/H stable.    PLAN:  - Appreciate ID recs  - Monitor LLE neurovascular exam  - Trend H/H  - Continue excellent care per CTICU    Vascular Surgery  [id number]   65M PMH ACC/AHA stage D HF due to NICM HM2 LVAD , TV annuloplasty ring 9/12/17 as destination therapy due to severe peripheral artery disease with significant stenosis  SIADH, Depression, CKD-3 with hyperkalemia, past E. coli UTIs, driveline drainage (1/7/21) and COVID-19, here initially for GIB prolonged hospital course, s/p tracheostomy, acalculous cholecystitis s/p perc dawn. Patient has persistent intermittent abdominal pain, CTA showing possible proximal SMA stenosis. CTA poor quality limited by significant streak artifact. Mesenteric duplex velocities without evidence of significant stenosis, however study unreliable in the setting of patient's augmented systolic function given LVAD. Now s/p diagnostic mesenteric angiogram with unsuccessful SMA stenting. 09/12 CTA obtained due to downtrending H/H with evidence of L RP hematoma without evidence of active extravasation, L groin wound stable. H/H stable.    PLAN:  - Appreciate ID recs  - Monitor LLE neurovascular exam  - Trend H/H  - Continue excellent care per CTICU    Vascular Surgery  [id number]    ATTENDING STATEMENT :  Full progress note as above; discussed with surgery resident and vascular fellow.   He has multiple comorbidities with severe CHF and has LVAD. He had difficulty tolerating tube feeds and had a mesenteric angiogram. At that time the SMA was identified. We were trying to cross it from the groin but the catheter and wire kept popping out. The stenosis  probably got a little dilated and that allowed him to tolerate 2 feeds. He will however likely redevelop the stenosis to a severe level. It could be angioplastied from the arm. That would be an easier way to access the artery. It was not done at the last  time as  I felt we were making progress with the groin and ultimately he received significant contrast and a significant dose of radiation. This was discussed with his cardiothoracic surgeon. They will reach out if they wish it to be done.

## 2021-09-19 NOTE — PROGRESS NOTE ADULT - SUBJECTIVE AND OBJECTIVE BOX
SURGERY PROGRESS NOTE    SUBJECTIVE / 24H EVENTS:  Patient seen and examined on morning rounds. No acute events overnight. Tolerating TF      OBJECTIVE:  VITAL SIGNS:  T(C): 36.8 (21 @ 12:00), Max: 36.9 (21 @ 00:00)  HR: 83 (21 @ 13:00) (80 - 93)  BP: --  RR: 28 (21 @ 13:00) (19 - 48)  SpO2: 100% (21 @ 13:00) (94% - 100%)  Daily     Daily Weight in k.7 (19 Sep 2021 00:00)  POCT Blood Glucose.: 127 mg/dL (21 @ 11:16)  POCT Blood Glucose.: 128 mg/dL (21 @ 05:15)  POCT Blood Glucose.: 122 mg/dL (21 @ 23:56)      PHYSICAL EXAM:  Gen: NAD  LS: Respirations unlabored on RA  GI: Soft. Nondistended. Mild tenderness to palpation in bilateral lower quadrants, minimal tenderness on left flank  Groin: L groin dressing c/d/i. Mildly tender to palpation but no swelling, ecchymosis or palpable mass. Palpable femoral pulse  Ext: Warm, well perfused.      21 @ 07:01  -  21 @ 07:00  --------------------------------------------------------  IN:    Enteral Tube Flush: 120 mL    IV PiggyBack: 150 mL    Miscellaneous Tube Feedin mL    sodium chloride 0.9%: 240 mL  Total IN: 1185 mL    OUT:    Drain (mL): 500 mL    Voided (mL): 950 mL  Total OUT: 1450 mL    Total NET: -265 mL      21 @ 07:01  -  21 @ 13:35  --------------------------------------------------------  IN:    Enteral Tube Flush: 50 mL    Miscellaneous Tube Feedin mL    sodium chloride 0.9%: 70 mL  Total IN: 285 mL    OUT:    Drain (mL): 150 mL    Emesis (mL): 15 mL  Total OUT: 165 mL    Total NET: 120 mL          LAB VALUES:      134<L>  |  98  |  12  ----------------------------<  127<H>  4.0   |  25  |  0.43<L>    Ca    9.5      19 Sep 2021 00:25  Phos  2.9       Mg     1.7         TPro  7.3  /  Alb  2.8<L>  /  TBili  1.1  /  DBili  x   /  AST  19  /  ALT  22  /  AlkPhos  252<H>                                 9.8    12.90 )-----------( 303      ( 19 Sep 2021 00:34 )             31.8     LIVER FUNCTIONS - ( 19 Sep 2021 00:25 )  Alb: 2.8 g/dL / Pro: 7.3 g/dL / ALK PHOS: 252 U/L / ALT: 22 U/L / AST: 19 U/L / GGT: x             ABG - ( 19 Sep 2021 00:14 )  pH, Arterial: 7.40  pH, Blood: x     /  pCO2: 46    /  pO2: 124   / HCO3: 28    / Base Excess: 3.2   /  SaO2: 99.8                      MICROBIOLOGY:      RADIOLOGY:  PACS Image: Image(s) Available (21 @ 02:01)        MEDICATIONS  (STANDING):  chlorhexidine 0.12% Liquid 15 milliLiter(s) Oral Mucosa two times a day  chlorhexidine 2% Cloths 1 Application(s) Topical <User Schedule>  dextrose 40% Gel 15 Gram(s) Oral once  dextrose 50% Injectable 25 Gram(s) IV Push once  dextrose 50% Injectable 12.5 Gram(s) IV Push once  glucagon  Injectable 1 milliGRAM(s) IntraMuscular once  insulin lispro (ADMELOG) corrective regimen sliding scale   SubCutaneous every 6 hours  lidocaine   4% Patch 1 Patch Transdermal daily  meropenem  IVPB 1000 milliGRAM(s) IV Intermittent every 8 hours  methimazole 15 milliGRAM(s) Oral <User Schedule>  metoclopramide Injectable 10 milliGRAM(s) IV Push every 8 hours  mirtazapine 7.5 milliGRAM(s) Oral daily  multivitamin 1 Tablet(s) Oral daily  pantoprazole  Injectable 40 milliGRAM(s) IV Push every 12 hours  propranolol 40 milliGRAM(s) Oral every 8 hours  sertraline 100 milliGRAM(s) Oral daily  sodium chloride 0.9%. 1000 milliLiter(s) (10 mL/Hr) IV Continuous <Continuous>  thiamine 100 milliGRAM(s) Oral daily  vancomycin    Solution 125 milliGRAM(s) Oral every 6 hours    MEDICATIONS  (PRN):  acetaminophen   Tablet .. 650 milliGRAM(s) Oral every 6 hours PRN Mild Pain (1 - 3)  ondansetron Injectable 4 milliGRAM(s) IV Push every 6 hours PRN Nausea and/or Vomiting  simethicone 80 milliGRAM(s) Chew every 8 hours PRN Gas  sodium chloride 0.9% lock flush 10 milliLiter(s) IV Push every 1 hour PRN Pre/post blood products, medications, blood draw, and to maintain line patency

## 2021-09-19 NOTE — PROGRESS NOTE ADULT - SUBJECTIVE AND OBJECTIVE BOX
RICKY HAMPTON  MRN-[id number]  Patient is a 66y old  Male who presents with a chief complaint of Anemia, Supratherapeutic INR, Dark Stools (18 Sep 2021 10:51)    HPI:  65M PMH ACC/AHA stage D HF due to NICM HM2 LVAD , TV annuloplasty ring 17 as destination therapy due to severe peripheral artery disease with significant stenosis  SIADH, Depression, CKD-3 with hyperkalemia, past E. coli UTIs, driveline drainage (21) and COVID-19 (back in 2020)  He was recently seen in clinic where he complained of abdominal pain and dark stools w constipation back in May. He presents to [facility] ER today weakness and fatigue, moderate and + Black stools for three days, on coumadin secondary to warfarin use in the setting of an LVAD. Patient has required transfusions for GIB in the past. Mostly recently back in 2021 pt had anemia with dark stools. No interventions was done at that time. However Last Endoscopy was done in 2020 (negative). Today labs show patient is anemic with H/H of 4.5/16.3,. INR is 8.84 MAP in the 90s, Temp 35.1. He denies any chest pain, shortness of breath, dizziness, abd pain, nausea or vomiting.       (2021 16:57)      Surgery/Hospital Course:   admit for melena w/ anemia, INR 8.84   6/15 Capsul study (+) for small bowel bleed, balloon endoscopy (old blood in prox ileum); post EGD - septic w/ L opacity, re-intubated for concern for aspiration, TTE (Mod MR, decrease biV w/ interventricular septum boweing towards R)   bronch    +C Diff    CT C/A/P: Fluid filled colon which may be 2/2 rapid transit. Small bilateral pleffs with associates. Compressive atelectasis New ISABELLE & LLL  parenchymal opacities, suspicious for pneumonia. 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    #8 Shiley trach at bedside    LVAD speed increased to 9200   Bronch   TC since . Patient transferred to SDU.    INR today 2.64.  H&H 7.3/24 this AM.  Will repeat CBC at noon, and will send stool guaiac Patient with persistent abdominal tenderness, rate of tube feeds decreased.  No nausea/vomiting.     INR today 2.4. H&H 9.1/28.6 low flow overnight /N&V, refusing Tube feeds on D5 1/2 normal  @50 cc/hr   INR 2.69  H&H 7.7/.1 refusing Tube feeds on D5 1/2 normal  @50 cc/hr. This am + BM Melena Dr Oneill HF  aware- PRBC x1  GI team consulted -  NPO  plan on study in am-  D/w Dr Cadet Patient  to return to CTU for further management; 1PRBCS    Post op INR 2.2 today.  No bleeding. BC + for SM.  Pt is hypotensive requiring pressor and inotropic support.  ID follow up today on Cefepime will follow.   R PTC for PTX    CT C/A/P: sub q emphysema in R chest wall, GGO RUL, small ascites CTH negative; Abd US: GB thickening, pericholecystic fluid     Perchole drain in place continues to drain total output overnight 133.  Fever today 38.8    duplex LE negative    Patient with persistent abdominal pain, refusing tube feeds and medications, Psych consulted   CTA A/P ordered to r/o mesenteric ischemia 2/2 persistent anorexia, nausea, vomiting. Revealed:  Evaluation of the mesenteric vessels is limited by streak artifact from LVAD. There appears to be severe stenosis of the proximal SMA; abdominal mesenteric doppler is recommended for further evaluation. 2.  No small bowel findings to suggest acute mesenteric ischemia. 3.  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  8/15: Cultures resulted BC 1/2 +GPC in clusters, SC enterobacter; mesenteric duplex: borderline stenosis of proximal SMA  : CT C/A/P noncon: Nondular opacities in R lung apex w/cavitation, abd nl  :  Continue current care, treatment of thyrotoxicosis with medications as per endocrine, d/c ABX as per team.    RUQ sono: Contracted gallbladder with cholecystostomy tube in place.  9/10 failed SMA stent, L fem PSA, s/p 3U PRCB   CTA Abd/Pelvis L RP hematoma     Today:    REVIEW OF SYSTEMS:  Gen: No fever  EYES/ENT: No visual changes;  No vertigo or throat pain   NECK: No pain   RES:  No shortness of breath or Cough  CARD: No chest pain   GI: +L sided abdominal pain on palpation, +BS  : No dysuria  NEURO: No weakness  SKIN: No itching, rashes     ICU Vital Signs Last 24 Hrs  T(C): 36.7 (19 Sep 2021 04:00), Max: 36.9 (19 Sep 2021 00:00)  T(F): 98 (19 Sep 2021 04:00), Max: 98.5 (19 Sep 2021 00:00)  HR: 88 (19 Sep 2021 07:00) (79 - 93)  BP: --  BP(mean): --  ABP: 102/67 (19 Sep 2021 07:00) (73/56 - 102/70)  ABP(mean): 79 (19 Sep 2021 07:00) (64 - 82)  RR: 33 (19 Sep 2021 07:00) (18 - 48)  SpO2: 100% (19 Sep 2021 07:00) (94% - 100%)      Physical Exam:  Gen:  Awake, alert, Sitting in chair in NAD.  Psych: Mood greatly improved but waxes and wanes, more interactive and participating in care. Would like to be more included in bedside rounding.  CNS:  intact, nonfocal, responds to all commands  Neck: no JVD, +TC   RES : course breath sounds, no wheezing, moderate tan secretions able to cough most up on own         CVS: +LVAD hum   Abd: Soft. Sybil drain with bilious fluid. Positive BS throughout. +L sided abdominal pain  Skin: No rash  Ext:  no edema    ============================I/O===========================   I&O's Detail    18 Sep 2021 07:01  -  19 Sep 2021 07:00  --------------------------------------------------------  IN:    Enteral Tube Flush: 120 mL    IV PiggyBack: 150 mL    Miscellaneous Tube Feedin mL    sodium chloride 0.9%: 240 mL  Total IN: 1185 mL    OUT:    Drain (mL): 500 mL    Voided (mL): 950 mL  Total OUT: 1450 mL    Total NET: -265 mL        ============================ LABS =========================                        9.8    12.90 )-----------( 303      ( 19 Sep 2021 00:34 )             31.8         134<L>  |  98  |  12  ----------------------------<  127<H>  4.0   |  25  |  0.43<L>    Ca    9.5      19 Sep 2021 00:25  Phos  2.9       Mg     1.7         TPro  7.3  /  Alb  2.8<L>  /  TBili  1.1  /  DBili  x   /  AST  19  /  ALT  22  /  AlkPhos  252<H>      LIVER FUNCTIONS - ( 19 Sep 2021 00:25 )  Alb: 2.8 g/dL / Pro: 7.3 g/dL / ALK PHOS: 252 U/L / ALT: 22 U/L / AST: 19 U/L / GGT: x             ABG - ( 19 Sep 2021 00:14 )  pH, Arterial: 7.40  pH, Blood: x     /  pCO2: 46    /  pO2: 124   / HCO3: 28    / Base Excess: 3.2   /  SaO2: 99.8                ======================Micro/Rad/Cardio=================  Culture: Reviewed   CXR: Reviewed  Echo:Reviewed  ======================================================  PAST MEDICAL & SURGICAL HISTORY:  CHF (congestive heart failure)    CAD (coronary artery disease)    Depression    Pleural effusion    History of 2019 novel coronavirus disease (COVID-19)  2020    Hemorrhoids    Bleeding hemorrhoids    Peripheral arterial disease    Claudication    BPH with urinary obstruction    ACC/AHA stage D systolic heart failure    Anticoagulation goal of INR 2.0 to 2.5    Falls    Clavicle fracture    CKD (chronic kidney disease), stage III    Iron deficiency anemia    H/O epistaxis    Vertigo    GI bleed    S/P TVR (tricuspid valve repair)    S/P ventricular assist device    S/P endoscopy      ====================ASSESSMENT ==============  Stage D Nonischemic Cardiomyopathy, Status Post HM2 on 2017    Cardiogenic shock  Hemodynamic instability   Acute hypoxemic respiratory failure s/p trach   GI bleed , Status Post Enteroscopy   Anemia, in setting of melena   Chronic Kidney Disease  Stress hyperglycemia   C.diff positive on    Hypovolemic shock  Septic shock  Leukocytosis  GB thickening/percholecystic s/p perc choley by IT   SMA stenosis  Serratia/citrobacter pneumonia   Stenotrophomonas pneumonia   Enterobacter pneumonia   Nasuea/vomiting  Deconditioning      Plan:  ====================== NEUROLOGY=====================  C/w mirtazapine and sertraline for depression  Deconditioned, PT/Ambulate as tolerated  Tylenol PRN for analgesia       acetaminophen   Tablet .. 650 milliGRAM(s) Oral every 6 hours PRN Mild Pain (1 - 3)  mirtazapine 7.5 milliGRAM(s) Oral daily  sertraline 100 milliGRAM(s) Oral daily    ==================== RESPIRATORY======================  Acute hypoxemic respiratory failure s/p #8 shiley trach on , continue TC as tolerated (TC since )  Continue close monitoring of respiratory rate and breathing pattern, following of ABG’s with A-line monitoring, continuous pulse oximetry monitoring.   Moderate secretions, continue suctioning prn, pulmonary toileting.      ====================CARDIOVASCULAR==================  Stage D Nonischemic Cardiomyopathy, Status Post HM2 on 2017; LVAD settings 9200, flow 5.3  TTE : reveals at least moderate MR, mild AI, severe global LV syst dysfxn, dec RV fxn   Propranolol for rate control of HR 2/2 Hyperthyroidism, titrate as tolerated per Endo  Continue invasive hemodynamic monitoring       propranolol 40 milliGRAM(s) Oral every 8 hours    ===================HEMATOLOGIC/ONC ===================  CTA A/P on  +L RP hematoma   Acute blood loss anemia, monitor H&H/Plts    Holding coumadin and ASA given recurrent GI bleeding    ===================== RENAL =========================  Continue monitoring urine output, I&OS, BUN/Cr   Euvolemic, even fluid balance, currently off diuretics.    Replete lytes PRN. Keep K> 4 and Mg >2     ==================== GASTROINTESTINAL===================  S/p cholecystostomy tube placed on  with IR   CTA A/P : Evaluation of the mesenteric vessels is limited by streak artifact from LVAD. There appears to be severe stenosis of the proximal SMA; abdominal mesenteric doppler is recommended for further evaluation. 2.  No small bowel findings to suggest acute mesenteric ischemia. 3.  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  Mesenteric duplex on 8/15 borderline stenosis of proximal SMA, s/p failed SMA stent, L fem RP hematoma on 9/10, s/p 3U PRBC.   Plan per Vascular surgeon Dr. Bran to re-attempt SMA stent by brachial cutdown when patient deemed stable to do so by Heart Failure team.   CTA A/P on  +L RP hematoma   Reglan for gut motility  Zofran PRN nausea/vomiting  Pt with multiple episodes of vomiting overnight. TF restarted today at 20cc/hr.  Pt would likely benefit from a PEG given persistent gastroparesis and TF intolerance.  However, pt does not wish to have PEG placed.    multivitamin 1 Tablet(s) Oral daily  GI prophylaxis, pantoprazole  Injectable 40 milliGRAM(s) IV Push every 12 hours  simethicone 80 milliGRAM(s) Chew every 8 hours PRN Gas  sodium chloride 0.9% lock flush 10 milliLiter(s) IV Push every 1 hour PRN Pre/post blood products, medications, blood draw, and to maintain line patency  sodium chloride 0.9%. 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 - Free T4 remains elevated   Per endocrine      - Thyroid US when trach is out      - Propanolol as above for optimal T4-->T3 conversion      - c/w Methimazole ---> consider changing to rectal if continues to have emesis for better absorption      - s/p hydrocortisone      dextrose 40% Gel 15 Gram(s) Oral once  dextrose 50% Injectable 25 Gram(s) IV Push once  dextrose 50% Injectable 12.5 Gram(s) IV Push once  glucagon  Injectable 1 milliGRAM(s) IntraMuscular once  insulin lispro (ADMELOG) corrective regimen sliding scale   SubCutaneous every 6 hours  methimazole 15 milliGRAM(s) Oral <User Schedule>    ========================INFECTIOUS DISEASE================  Afebrile, WBC downtrending 18.14 -> 12.90 ; leukocytosis   Continue trending WBC and monitoring fever curve  SCx : +Moderate Serratia marcescens, BCx on 9/15 BGTD   Empiric coverage broadened to Meropenem and Vancomycin    meropenem  IVPB 1000 milliGRAM(s) IV Intermittent every 8 hours  vancomycin    Solution 125 milliGRAM(s) Oral every 6 hours      Patient requires continuous monitoring with bedside rhythm monitoring, pulse ox monitoring, and intermittent blood gas analysis. Care plan discussed with ICU care team. Patient remained critical and at risk for life threatening decompensation.     By signing my name below, I, Aparna Saleh, attest that this documentation has been prepared under the direction and in the presence of CLAUDIA Rogers   Electronically signed: Romel Villegas, 21 @ 08:01    I, Katie Goncalves, personally performed the services described in this documentation. all medical record entries made by the romel were at my direction and in my presence. I have reviewed the chart and agree that the record reflects my personal performance and is accurate and complete  Electronically signed: CLAUDIA Rogers        RICKY HAMPTON  MRN-[id number]  Patient is a 66y old  Male who presents with a chief complaint of Anemia, Supratherapeutic INR, Dark Stools (18 Sep 2021 10:51)    HPI:  65M PMH ACC/AHA stage D HF due to NICM HM2 LVAD , TV annuloplasty ring 17 as destination therapy due to severe peripheral artery disease with significant stenosis  SIADH, Depression, CKD-3 with hyperkalemia, past E. coli UTIs, driveline drainage (21) and COVID-19 (back in 2020)  He was recently seen in clinic where he complained of abdominal pain and dark stools w constipation back in May. He presents to [facility] ER today weakness and fatigue, moderate and + Black stools for three days, on coumadin secondary to warfarin use in the setting of an LVAD. Patient has required transfusions for GIB in the past. Mostly recently back in 2021 pt had anemia with dark stools. No interventions was done at that time. However Last Endoscopy was done in 2020 (negative). Today labs show patient is anemic with H/H of 4.5/16.3,. INR is 8.84 MAP in the 90s, Temp 35.1. He denies any chest pain, shortness of breath, dizziness, abd pain, nausea or vomiting.       (2021 16:57)      Surgery/Hospital Course:   admit for melena w/ anemia, INR 8.84   6/15 Capsul study (+) for small bowel bleed, balloon endoscopy (old blood in prox ileum); post EGD - septic w/ L opacity, re-intubated for concern for aspiration, TTE (Mod MR, decrease biV w/ interventricular septum boweing towards R)   bronch    +C Diff    CT C/A/P: Fluid filled colon which may be 2/2 rapid transit. Small bilateral pleffs with associates. Compressive atelectasis New ISABELLE & LLL  parenchymal opacities, suspicious for pneumonia. 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    #8 Shiley trach at bedside    LVAD speed increased to 9200   Bronch   TC since . Patient transferred to SDU.    INR today 2.64.  H&H 7.3/24 this AM.  Will repeat CBC at noon, and will send stool guaiac Patient with persistent abdominal tenderness, rate of tube feeds decreased.  No nausea/vomiting.     INR today 2.4. H&H 9.1/28.6 low flow overnight /N&V, refusing Tube feeds on D5 1/2 normal  @50 cc/hr   INR 2.69  H&H 7.7/.1 refusing Tube feeds on D5 1/2 normal  @50 cc/hr. This am + BM Melena Dr Oneill HF  aware- PRBC x1  GI team consulted -  NPO  plan on study in am-  D/w Dr Cadet Patient  to return to CTU for further management; 1PRBCS    Post op INR 2.2 today.  No bleeding. BC + for SM.  Pt is hypotensive requiring pressor and inotropic support.  ID follow up today on Cefepime will follow.   R PTC for PTX    CT C/A/P: sub q emphysema in R chest wall, GGO RUL, small ascites CTH negative; Abd US: GB thickening, pericholecystic fluid     Perchole drain in place continues to drain total output overnight 133.  Fever today 38.8    duplex LE negative    Patient with persistent abdominal pain, refusing tube feeds and medications, Psych consulted   CTA A/P ordered to r/o mesenteric ischemia 2/2 persistent anorexia, nausea, vomiting. Revealed:  Evaluation of the mesenteric vessels is limited by streak artifact from LVAD. There appears to be severe stenosis of the proximal SMA; abdominal mesenteric doppler is recommended for further evaluation. 2.  No small bowel findings to suggest acute mesenteric ischemia. 3.  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  8/15: Cultures resulted BC 1/2 +GPC in clusters, SC enterobacter; mesenteric duplex: borderline stenosis of proximal SMA  : CT C/A/P noncon: Nondular opacities in R lung apex w/cavitation, abd nl  :  Continue current care, treatment of thyrotoxicosis with medications as per endocrine, d/c ABX as per team.    RUQ sono: Contracted gallbladder with cholecystostomy tube in place.  9/10 failed SMA stent, L fem PSA, s/p 3U PRCB   CTA Abd/Pelvis L RP hematoma     Today: No complaints, more somnolent. Tube feeds advancing to 35cc/h. CXR RLL improving.    REVIEW OF SYSTEMS:  Gen: No fever  EYES/ENT: No visual changes;  No vertigo or throat pain   NECK: No pain   RES:  No shortness of breath or Cough  CARD: No chest pain   GI: +L sided abdominal pain on palpation, +BS  : No dysuria  NEURO: No weakness  SKIN: No itching, rashes     ICU Vital Signs Last 24 Hrs  T(C): 36.7 (19 Sep 2021 04:00), Max: 36.9 (19 Sep 2021 00:00)  T(F): 98 (19 Sep 2021 04:00), Max: 98.5 (19 Sep 2021 00:00)  HR: 88 (19 Sep 2021 07:00) (79 - 93)  BP: --  BP(mean): --  ABP: 102/67 (19 Sep 2021 07:00) (73/56 - 102/70)  ABP(mean): 79 (19 Sep 2021 07:00) (64 - 82)  RR: 33 (19 Sep 2021 07:00) (18 - 48)  SpO2: 100% (19 Sep 2021 07:00) (94% - 100%)      Physical Exam:  Gen:  Awake, alert, Sitting in chair in NAD.  Psych: Mood greatly improved but waxes and wanes, more interactive and participating in care. Would like to be more included in bedside rounding.  CNS:  intact, nonfocal, responds to all commands  Neck: no JVD, +TC   RES : course breath sounds, no wheezing, moderate tan secretions able to cough most up on own         CVS: +LVAD hum   Abd: Soft. Sybil drain with bilious fluid. Positive BS throughout. +L sided abdominal pain  Skin: No rash  Ext:  no edema    ============================I/O===========================   I&O's Detail    18 Sep 2021 07:01  -  19 Sep 2021 07:00  --------------------------------------------------------  IN:    Enteral Tube Flush: 120 mL    IV PiggyBack: 150 mL    Miscellaneous Tube Feedin mL    sodium chloride 0.9%: 240 mL  Total IN: 1185 mL    OUT:    Drain (mL): 500 mL    Voided (mL): 950 mL  Total OUT: 1450 mL    Total NET: -265 mL        ============================ LABS =========================                        9.8    12.90 )-----------( 303      ( 19 Sep 2021 00:34 )             31.8         134<L>  |  98  |  12  ----------------------------<  127<H>  4.0   |  25  |  0.43<L>    Ca    9.5      19 Sep 2021 00:25  Phos  2.9       Mg     1.7         TPro  7.3  /  Alb  2.8<L>  /  TBili  1.1  /  DBili  x   /  AST  19  /  ALT  22  /  AlkPhos  252<H>      LIVER FUNCTIONS - ( 19 Sep 2021 00:25 )  Alb: 2.8 g/dL / Pro: 7.3 g/dL / ALK PHOS: 252 U/L / ALT: 22 U/L / AST: 19 U/L / GGT: x             ABG - ( 19 Sep 2021 00:14 )  pH, Arterial: 7.40  pH, Blood: x     /  pCO2: 46    /  pO2: 124   / HCO3: 28    / Base Excess: 3.2   /  SaO2: 99.8                ======================Micro/Rad/Cardio=================  Culture: Reviewed   CXR: Reviewed  Echo:Reviewed  ======================================================  PAST MEDICAL & SURGICAL HISTORY:  CHF (congestive heart failure)    CAD (coronary artery disease)    Depression    Pleural effusion    History of 2019 novel coronavirus disease (COVID-19)  2020    Hemorrhoids    Bleeding hemorrhoids    Peripheral arterial disease    Claudication    BPH with urinary obstruction    ACC/AHA stage D systolic heart failure    Anticoagulation goal of INR 2.0 to 2.5    Falls    Clavicle fracture    CKD (chronic kidney disease), stage III    Iron deficiency anemia    H/O epistaxis    Vertigo    GI bleed    S/P TVR (tricuspid valve repair)    S/P ventricular assist device    S/P endoscopy      ====================ASSESSMENT ==============  Stage D Nonischemic Cardiomyopathy, Status Post HM2 on 2017    Cardiogenic shock  Hemodynamic instability   Acute hypoxemic respiratory failure s/p trach   GI bleed , Status Post Enteroscopy   Anemia, in setting of melena   Chronic Kidney Disease  Stress hyperglycemia   C.diff positive on    Hypovolemic shock  Septic shock  Leukocytosis  GB thickening/percholecystic s/p perc choley by IT   SMA stenosis  Serratia/citrobacter pneumonia   Stenotrophomonas pneumonia   Enterobacter pneumonia   Nasuea/vomiting  Deconditioning      Plan:  ====================== NEUROLOGY=====================  C/w mirtazapine and sertraline for depression  Deconditioned, PT/Ambulate as tolerated  Tylenol PRN for analgesia       acetaminophen   Tablet .. 650 milliGRAM(s) Oral every 6 hours PRN Mild Pain (1 - 3)  mirtazapine 7.5 milliGRAM(s) Oral daily  sertraline 100 milliGRAM(s) Oral daily    ==================== RESPIRATORY======================  Acute hypoxemic respiratory failure s/p #8 shiley trach on , continue TC as tolerated (TC since )  Continue close monitoring of respiratory rate and breathing pattern, following of ABG’s with A-line monitoring, continuous pulse oximetry monitoring.   Moderate secretions, continue suctioning prn, pulmonary toileting.      ====================CARDIOVASCULAR==================  Stage D Nonischemic Cardiomyopathy, Status Post HM2 on 2017; LVAD settings 9200, flow 5.3  TTE : reveals at least moderate MR, mild AI, severe global LV syst dysfxn, dec RV fxn   Propranolol for rate control of HR 2/2 Hyperthyroidism, titrate as tolerated per Endo  Continue invasive hemodynamic monitoring       propranolol 40 milliGRAM(s) Oral every 8 hours    ===================HEMATOLOGIC/ONC ===================  CTA A/P on  +L RP hematoma   Acute blood loss anemia, monitor H&H/Plts    Holding coumadin and ASA given recurrent GI bleeding    ===================== RENAL =========================  Continue monitoring urine output, I&OS, BUN/Cr   Euvolemic, even fluid balance, currently off diuretics.    Replete lytes PRN. Keep K> 4 and Mg >2     ==================== GASTROINTESTINAL===================  S/p cholecystostomy tube placed on  with IR   CTA A/P : Evaluation of the mesenteric vessels is limited by streak artifact from LVAD. There appears to be severe stenosis of the proximal SMA; abdominal mesenteric doppler is recommended for further evaluation. 2.  No small bowel findings to suggest acute mesenteric ischemia. 3.  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  Mesenteric duplex on 8/15 borderline stenosis of proximal SMA, s/p failed SMA stent, L fem RP hematoma on 9/10, s/p 3U PRBC.   Plan per Vascular surgeon Dr. Bran to re-attempt SMA stent by brachial cutdown when patient deemed stable to do so by Heart Failure team.   CTA A/P on  +L RP hematoma   Reglan for gut motility  Zofran PRN nausea/vomiting  Pt would likely benefit from a PEG given persistent gastroparesis and TF intolerance.  However, pt does not wish to have PEG placed.    multivitamin 1 Tablet(s) Oral daily  GI prophylaxis, pantoprazole  Injectable 40 milliGRAM(s) IV Push every 12 hours  simethicone 80 milliGRAM(s) Chew every 8 hours PRN Gas  sodium chloride 0.9% lock flush 10 milliLiter(s) IV Push every 1 hour PRN Pre/post blood products, medications, blood draw, and to maintain line patency  sodium chloride 0.9%. 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 - Free T4 remains elevated   Per endocrine      - Thyroid US when trach is out      - Propanolol as above for optimal T4-->T3 conversion      - c/w Methimazole ---> consider changing to rectal if continues to have emesis for better absorption      - s/p hydrocortisone      dextrose 40% Gel 15 Gram(s) Oral once  dextrose 50% Injectable 25 Gram(s) IV Push once  dextrose 50% Injectable 12.5 Gram(s) IV Push once  glucagon  Injectable 1 milliGRAM(s) IntraMuscular once  insulin lispro (ADMELOG) corrective regimen sliding scale   SubCutaneous every 6 hours  methimazole 15 milliGRAM(s) Oral <User Schedule>    ========================INFECTIOUS DISEASE================  Afebrile, WBC downtrending 18.14 -> 12.90 ; leukocytosis   Continue trending WBC and monitoring fever curve  SCx 9/14: +Moderate Serratia marcescens, BCx on 9/15 BGTD   Empiric coverage broadened to Meropenem and Vancomycin    meropenem  IVPB 1000 milliGRAM(s) IV Intermittent every 8 hours  vancomycin    Solution 125 milliGRAM(s) Oral every 6 hours      Patient requires continuous monitoring with bedside rhythm monitoring, pulse ox monitoring, and intermittent blood gas analysis. Care plan discussed with ICU care team. Patient remained critical and at risk for life threatening decompensation.     By signing my name below, I, Aparna Saleh, attest that this documentation has been prepared under the direction and in the presence of CLAUDIA Rogers   Electronically signed: Romel Villegas, 21 @ 08:01    I, Katie Goncalves, personally performed the services described in this documentation. all medical record entries made by the romel were at my direction and in my presence. I have reviewed the chart and agree that the record reflects my personal performance and is accurate and complete  Electronically signed: CLAUDIA Rogers

## 2021-09-20 NOTE — PROGRESS NOTE ADULT - PROBLEM SELECTOR PLAN 2
- Continue current speed of 9200 RPM. Speed increased this admission due to signs of inadequately unloaded left ventricle  - MAP is at goal at this time  - Holding coumadin and aspirin indefinitely given recurrent GI bleeding.   - LDH level remains elevated, currently 372. likely related to RP bleed. Remains off AC as stated above. Continue to monitor levels daily

## 2021-09-20 NOTE — PROGRESS NOTE ADULT - ASSESSMENT
Seen sitting in bed on CTU with Trach collar in place. Communication improved. Has been working with Speech therapy. Communication augmented with hand gestures, and nodding yes/no. Reports his mood is down with periods of frustration in setting of extended and complex hospitalization. Ambulated in hallway with PT. Reports his legs feel tired after ambulating. Continues to endorse abdominal pain in area of driveline. Went outside earlier in week which he enjoyed. Poor sleep most nights this week. Takes Klonapin at bedtime. Psych following patient. Denies anhedonia. Denies SI/HI. Receptive to support, validation.     Dx: Adjustment disorder with anxiety and depressed mood. F43.20 Systolic heart failure I50.2  LVAD Z95.811    Recommendations:   Whenever possible, provide change of environment (going outside, sitting by window) to foster positive mood   To promote sleep, keep lights and noise level to minimum during night  Engage with patient as often as possible throughout day to encourage communication  Behavioral Cardiology will follow      16 minutes spent on total patient encounter    Jessica Hennessy, PhD  [phone]

## 2021-09-20 NOTE — PROGRESS NOTE ADULT - PROBLEM SELECTOR PLAN 3
- Chronic in nature with questionable SMA stenosis on imaging and mesenteric duplex difficult to interpret with continuous flow. Underwent angiogram on 9/11 which showed SMA stenosis. Unable to place stent. Would hold off on repeating angiogram at this time.   - Continue to advance feeds as tolerates, goal rate 65 cc/hr  - Will need to discuss with GI about possibly undergoing PEGJ tube placed  - Discussed cholecystostomy with general surgery, unlikely to derive symptomatic benefit since perc dawn tube continues to drain. Repeat RUQ shows contracted gallbladder. Will need to discuss with general surgery about long term plan for drain.

## 2021-09-20 NOTE — PROGRESS NOTE ADULT - ASSESSMENT
65 YO M with a history of stage D NICM s/p HM2 on 9/2017 as DT (due to severe PAD) with TV ring, prior COVID-19 infection 4/2020, recurrent syncope post LVAD s/p ILR, and chronic abdominal pain with prior negative workup who was admitted 6/14/21 with symptomatic anemia with Hgb 4.5 in setting of INR 8.8 without hemodynamic instability and has since had a protracted hospitalization. He was transfused and underwent VCE which showed active bleeding in the mid small bowel but subsequent enteroscopy 6/15 did not reveal any active bleeding. He acutely decompensated after procedure with fever/hypertension, low flow alarms, and pulmonary infiltrate with hypoxia requiring intubation from probable aspiration PNA. He was unable to extubated and has since undergone tracheostomy but tolerating persistent trach collar and nearing ability for decannulation. 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 Short term goal is maintenance of adequate nutrition and eventual trach decannulation.     Underwent mesenteric angiogram on 9/10 with vascular surgery. Found to have SMA stenosis at its origin but unable to place stent. Intra-op received 3uPRBC and required fem stop placement for L pseudoaneurysm. Post-op continued to have drop in H/H requiring additional 2u PRBC (last on 9/12), CT angio revealed left-sided retroperitoneal hematoma. Most recent sputum culture is positive for serratia marcescens, currently on meropenum. He remains frail and deconditioned, will continue to work with PT and advance tube feeds as tolerated.

## 2021-09-20 NOTE — PROGRESS NOTE ADULT - PROBLEM SELECTOR PLAN 4
- ID following and appreciate recommendations   - serratia bacteremia and poss acalculous cholecystitis. B/c with gram + cocci and many enterobacter Carbapenem resistant strain and now s/p per dawn drain  - also with enterobacter from sputum culture 8/13  - sputum cx 9/14 positive for serratia marcescens  - Blood Cultures remains NGTD  - currently on Meropenum IV q8  - would recommend sending C.diff sample as patient is having loose BM

## 2021-09-20 NOTE — PROGRESS NOTE ADULT - SUBJECTIVE AND OBJECTIVE BOX
Behavioral Cardiology Progress Note     History of present illness: Mr. Quispe is a 66 year-old man with history of NICM s/p HM2 on 9/2017 as DT (due to severe PAD) with TV ring, prior COVID-19 infection 4/2020, recurrent syncope post LVAD s/p ILR, and chronic abdominal pain with prior negative workup who was admitted with symptomatic anemia with Hgb 4.5 in setting of INR 8.8 without hemodynamic instability. Testing showed active bleeding in the small bowel but subsequent enteroscopy 6/15 did not reveal any active bleeding. He acutely decompensated after procedure with fever/hypertension, low flow alarms, and pulmonary infiltrate with hypoxia requiring intubation from probable aspiration PNA.  Course notable for inability to wean ventilator with persistent secretions for which he underwent tracheostomy as well as acute c diff colitis.     Social history: , lives in his sister’s house in [place]. Has 3 sons (2 live in , 1 in [facility]). One of 3 siblings. Lives in his sister’s house in [place] (Cristina Rudolph [phone]), also in the home are niece [phone]) and nephew (Miller Rudolph).  Another sister lives close by in [place] and all other siblings live in [facility] which the family visit often.  Worked full time at [employer] in [place], stopped working due to heart disease. Education: high school graduate.     Substance use:   Tobacco: Former smoker, 8-10 cigarettes/day for 30 years.   Alcohol: Past use of 3-4 drinks of Henessey 2x/week. None for past 4 years.   Drug: Denies any drug use.     Past psychiatric history: Denies     Mental status exam: Seen sitting in bed with Trach collar. Communicating via mouthing words and using hand gestures. Awake, alert, oriented x3. Thought process goal oriented. No evidence of any psychosis, kenan, delusions. No SI/HI. Mood improved. Affect less constricted. Denies SI/HI. Insight and judgment adequate.

## 2021-09-20 NOTE — PROGRESS NOTE ADULT - ASSESSMENT
Assessment and Recommendation:   · Assessment	  Assessment and recommendation :  Recurrent Acute hypoxic respiratory Failure S/P tracheostomy on full ventilatory support  50% FI02,   Acute blood loss anemia S/P multiple  blood transfusion   going for mesenteric angiogram  S/P septic shock off vasopressin , levophed and off  Dobutamine   S/P cholecystostomy tube placement by IR    AF RVR back to regular sinus Rhythm   Non ischemic cardiomyopathy continue ACE inhibitor and B-Blockers   mesenteric ischemia failure to stent SMA , no plan for repeated trial   S/P 3 unit of blood transfusion   black stool for Guiac  S/P Septic shock and cardiogenic shock   Stage D systolic heart failure S/P LVAD HM2   MH2 LVAD  with  TV Annuloplasty  Severe peripheral vascular disease   severe hyperglycemia on insulin coverage    on meropenem and po vancomycin   Reglan 10 mg for Gastric Motility   hyperthyroidism on Methimazole 10mg TID   critical care polyneuropathy   Anemia of Acute blood Loss   severe protein caloric malnutrition   S/P blood and FFP transfusion   Chronic kidney disease stage III  Depressed and withdrawn   on   NGT feeding on Vital  advanced to 55 cc/ hr   ? decannulation   GI prophylaxis with PPI   Discussed with TCV team and vascular

## 2021-09-20 NOTE — PROGRESS NOTE ADULT - ATTENDING COMMENTS
Restated on tube feeds and tolerating tube feeds for now. Will engage with general surgery regarding plan for cholecystotomy tube. After finalizing plan with gen surgery, will decide on how to proceed with PEG tube. Fevers and WBC have resolved now on meropenam and vancomycin. Low threshold to repeat CT Chest/Abdomen/Pelvis.

## 2021-09-20 NOTE — PROGRESS NOTE ADULT - SUBJECTIVE AND OBJECTIVE BOX
RICKY HAMPTON  MRN-[id number]  Patient is a 66y old  Male who presents with a chief complaint of Anemia, Supratherapeutic INR, Dark Stools (19 Sep 2021 13:34)    HPI:  65M PMH ACC/AHA stage D HF due to NICM HM2 LVAD , TV annuloplasty ring 17 as destination therapy due to severe peripheral artery disease with significant stenosis  SIADH, Depression, CKD-3 with hyperkalemia, past E. coli UTIs, driveline drainage (21) and COVID-19 (back in 2020)  He was recently seen in clinic where he complained of abdominal pain and dark stools w constipation back in May. He presents to [facility] ER today weakness and fatigue, moderate and + Black stools for three days, on coumadin secondary to warfarin use in the setting of an LVAD. Patient has required transfusions for GIB in the past. Mostly recently back in 2021 pt had anemia with dark stools. No interventions was done at that time. However Last Endoscopy was done in 2020 (negative). Today labs show patient is anemic with H/H of 4.5/16.3,. INR is 8.84 MAP in the 90s, Temp 35.1. He denies any chest pain, shortness of breath, dizziness, abd pain, nausea or vomiting.       (2021 16:57)      Surgery/Hospital Course:   admit for melena w/ anemia, INR 8.84   6/15 Capsul study (+) for small bowel bleed, balloon endoscopy (old blood in prox ileum); post EGD - septic w/ L opacity, re-intubated for concern for aspiration, TTE (Mod MR, decrease biV w/ interventricular septum boweing towards R)   bronch    +C Diff    CT C/A/P: Fluid filled colon which may be 2/2 rapid transit. Small bilateral pleffs with associates. Compressive atelectasis New ISABELLE & LLL  parenchymal opacities, suspicious for pneumonia. 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    #8 Shiley trach at bedside    LVAD speed increased to 9200   Bronch   TC since . Patient transferred to SDU.    INR today 2.64.  H&H 7.3/24 this AM.  Will repeat CBC at noon, and will send stool guaiac Patient with persistent abdominal tenderness, rate of tube feeds decreased.  No nausea/vomiting.     INR today 2.4. H&H 9.1/28.6 low flow overnight /N&V, refusing Tube feeds on D5 1/2 normal  @50 cc/hr   INR 2.69  H&H 7.7/.1 refusing Tube feeds on D5 1/2 normal  @50 cc/hr. This am + BM Melena Dr Oneill HF  aware- PRBC x1  GI team consulted -  NPO  plan on study in am-  D/w Dr Cadet Patient  to return to CTU for further management; 1PRBCS    Post op INR 2.2 today.  No bleeding. BC + for SM.  Pt is hypotensive requiring pressor and inotropic support.  ID follow up today on Cefepime will follow.   R PTC for PTX    CT C/A/P: sub q emphysema in R chest wall, GGO RUL, small ascites CTH negative; Abd US: GB thickening, pericholecystic fluid     Perchole drain in place continues to drain total output overnight 133.  Fever today 38.8    duplex LE negative    Patient with persistent abdominal pain, refusing tube feeds and medications, Psych consulted   CTA A/P ordered to r/o mesenteric ischemia 2/2 persistent anorexia, nausea, vomiting. Revealed:  Evaluation of the mesenteric vessels is limited by streak artifact from LVAD. There appears to be severe stenosis of the proximal SMA; abdominal mesenteric doppler is recommended for further evaluation. 2.  No small bowel findings to suggest acute mesenteric ischemia. 3.  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  8/15: Cultures resulted BC 1/2 +GPC in clusters, SC enterobacter; mesenteric duplex: borderline stenosis of proximal SMA  : CT C/A/P noncon: Nondular opacities in R lung apex w/cavitation, abd nl  :  Continue current care, treatment of thyrotoxicosis with medications as per endocrine, d/c ABX as per team.    RUQ sono: Contracted gallbladder with cholecystostomy tube in place.  9/10 failed SMA stent, L fem PSA, s/p 3U PRCB   CTA Abd/Pelvis L RP hematoma     Today:    REVIEW OF SYSTEMS:  Gen: No fever  EYES/ENT: No visual changes;  No vertigo or throat pain   NECK: No pain   RES:  No shortness of breath or Cough  CARD: No chest pain   GI: +L sided abdominal pain on palpation, +BS  : No dysuria  NEURO: No weakness  SKIN: No itching, rashes       ICU Vital Signs Last 24 Hrs  T(C): 36.7 (20 Sep 2021 08:00), Max: 36.8 (19 Sep 2021 12:00)  T(F): 98 (20 Sep 2021 08:00), Max: 98.2 (19 Sep 2021 12:00)  HR: 85 (20 Sep 2021 08:00) (77 - 95)  BP: --  BP(mean): --  ABP: 92/63 (20 Sep 2021 08:00) (76/58 - 111/80)  ABP(mean): 74 (20 Sep 2021 08:00) (65 - 91)  RR: 27 (20 Sep 2021 08:00) (24 - 65)  SpO2: 100% (20 Sep 2021 08:00) (97% - 100%)      Physical Exam:  Gen:  Awake, alert, Sitting in chair in NAD.  Psych: Mood greatly improved but waxes and wanes, more interactive and participating in care. Would like to be more included in bedside rounding.  CNS:  intact, nonfocal, responds to all commands  Neck: no JVD, +TC   RES : course breath sounds, no wheezing, moderate tan secretions able to cough most up on own         CVS: +LVAD hum   Abd: Soft. Sybil drain with bilious fluid. Positive BS throughout. +L sided abdominal pain  Skin: No rash  Ext:  no edema      ============================I/O===========================   I&O's Detail    19 Sep 2021 07:01  -  20 Sep 2021 07:00  --------------------------------------------------------  IN:    Enteral Tube Flush: 250 mL    IV PiggyBack: 150 mL    Miscellaneous Tube Feedin mL    sodium chloride 0.9%: 180 mL  Total IN: 1380 mL    OUT:    Drain (mL): 510 mL    Emesis (mL): 15 mL    Voided (mL): 650 mL  Total OUT: 1175 mL    Total NET: 205 mL      20 Sep 2021 07:01  -  20 Sep 2021 08:24  --------------------------------------------------------  IN:    Miscellaneous Tube Feedin mL  Total IN: 80 mL    OUT:  Total OUT: 0 mL    Total NET: 80 mL        ============================ LABS =========================                        9.8    10.55 )-----------( 320      ( 20 Sep 2021 00:28 )             31.6     09-20    131<L>  |  97  |  11  ----------------------------<  118<H>  4.4   |  25  |  0.45<L>    Ca    9.1      20 Sep 2021 00:28  Phos  2.9       Mg     2.0         TPro  7.6  /  Alb  2.7<L>  /  TBili  0.8  /  DBili  x   /  AST  26  /  ALT  24  /  AlkPhos  234<H>  09-20    LIVER FUNCTIONS - ( 20 Sep 2021 00:28 )  Alb: 2.7 g/dL / Pro: 7.6 g/dL / ALK PHOS: 234 U/L / ALT: 24 U/L / AST: 26 U/L / GGT: x             ABG - ( 20 Sep 2021 00:23 )  pH, Arterial: 7.40  pH, Blood: x     /  pCO2: 50    /  pO2: 159   / HCO3: 31    / Base Excess: 5.3   /  SaO2: 100.0               ======================Micro/Rad/Cardio=================  Culture: Reviewed   CXR: Reviewed  Echo:Reviewed  ======================================================  PAST MEDICAL & SURGICAL HISTORY:  CHF (congestive heart failure)    CAD (coronary artery disease)    Depression    Pleural effusion    History of  novel coronavirus disease (COVID-19)  2020    Hemorrhoids    Bleeding hemorrhoids    Peripheral arterial disease    Claudication    BPH with urinary obstruction    ACC/AHA stage D systolic heart failure    Anticoagulation goal of INR 2.0 to 2.5    Falls    Clavicle fracture    CKD (chronic kidney disease), stage III    Iron deficiency anemia    H/O epistaxis    Vertigo    GI bleed    S/P TVR (tricuspid valve repair)    S/P ventricular assist device    S/P endoscopy      ====================ASSESSMENT ==============  Stage D Nonischemic Cardiomyopathy, Status Post HM2 on 2017    Cardiogenic shock  Hemodynamic instability   Acute hypoxemic respiratory failure s/p trach   GI bleed , Status Post Enteroscopy   Anemia, in setting of melena   Chronic Kidney Disease  Stress hyperglycemia   C.diff positive on    Hypovolemic shock  Septic shock  Leukocytosis  GB thickening/percholecystic s/p perc choley by IT   SMA stenosis  Serratia/citrobacter pneumonia   Stenotrophomonas pneumonia   Enterobacter pneumonia   Nasuea/vomiting  Deconditioning    Plan:  ====================== NEUROLOGY=====================  Nonfocal, continue to monitor neuro status per ICU protocol.   Tylenol PRN for analgesia   C/w mirtazapine and sertraline for depression  Deconditioned, PT/Ambulate as tolerated    acetaminophen   Tablet .. 650 milliGRAM(s) Oral every 6 hours PRN Mild Pain (1 - 3)  mirtazapine 7.5 milliGRAM(s) Oral daily  sertraline 100 milliGRAM(s) Oral daily  ==================== RESPIRATORY======================  Acute hypoxemic respiratory failure s/p #8 betzaida trach on , continue TC as tolerated (TC since )  Continue close monitoring of respiratory rate and breathing pattern, following of ABG’s with A-line monitoring, continuous pulse oximetry monitoring.   Moderate secretions, continue suctioning prn, pulmonary toileting.    ====================CARDIOVASCULAR==================  Stage D Nonischemic Cardiomyopathy, Status Post HM2 on 2017; LVAD settings 9200, flow 5.3  TTE : reveals at least moderate MR, mild AI, severe global LV syst dysfxn, dec RV fxn   Propranolol for rate control of HR 2/2 Hyperthyroidism, titrate as tolerated per Endo  Continue invasive hemodynamic monitoring     propranolol 40 milliGRAM(s) Oral every 8 hours  ===================HEMATOLOGIC/ONC ===================  CTA A/P on  +L RP hematoma   Acute blood loss anemia, monitor H&H/Plts    Holding coumadin and ASA given recurrent GI bleeding    ===================== RENAL =========================  Continue monitoring urine output, I&OS, BUN/Cr   Euvolemic, even fluid balance, currently off diuretics.    Replete lytes PRN. Keep K> 4 and Mg >2   ==================== GASTROINTESTINAL===================  S/p cholecystostomy tube placed on  with IR   CTA A/P : Evaluation of the mesenteric vessels is limited by streak artifact from LVAD. There appears to be severe stenosis of the proximal SMA; abdominal mesenteric doppler is recommended for further evaluation. 2.  No small bowel findings to suggest acute mesenteric ischemia. 3.  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  Mesenteric duplex on 8/15 borderline stenosis of proximal SMA, s/p failed SMA stent, L fem RP hematoma on 9/10, s/p 3U PRBC.   Plan per Vascular surgeon Dr. Bran to re-attempt SMA stent by brachial cutdown when patient deemed stable to do so by Heart Failure team.   CTA A/P on  +L RP hematoma   Reglan for gut motility  Zofran PRN nausea/vomiting  Pt would likely benefit from a PEG given persistent gastroparesis and TF intolerance.  However, pt does not wish to have PEG placed.  Continue Protonix for stress ulcer prophylaxis.     multivitamin 1 Tablet(s) Oral daily  pantoprazole  Injectable 40 milliGRAM(s) IV Push every 12 hours  simethicone 80 milliGRAM(s) Chew every 8 hours PRN Gas  sodium chloride 0.9% lock flush 10 milliLiter(s) IV Push every 1 hour PRN Pre/post blood products, medications, blood draw, and to maintain line patency  sodium chloride 0.9%. 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 - Free T4 remains elevated   Per endocrine      - Thyroid US when trach is out      - Propanolol as above for optimal T4-->T3 conversion      - c/w Methimazole ---> consider changing to rectal if continues to have emesis for better absorption      - s/p hydrocortisone    dextrose 40% Gel 15 Gram(s) Oral once  dextrose 50% Injectable 25 Gram(s) IV Push once  dextrose 50% Injectable 12.5 Gram(s) IV Push once  glucagon  Injectable 1 milliGRAM(s) IntraMuscular once  insulin lispro (ADMELOG) corrective regimen sliding scale   SubCutaneous every 6 hours  methimazole 15 milliGRAM(s) Oral <User Schedule>  ========================INFECTIOUS DISEASE================  Afebrile ; leukocytosis   Continue trending WBC and monitoring fever curve  SCx : +Moderate Serratia marcescens, BCx on 9/15 BGTD   Empiric coverage broadened to Meropenem and Vancomycin    meropenem  IVPB 1000 milliGRAM(s) IV Intermittent every 8 hours  vancomycin    Solution 125 milliGRAM(s) Oral every 6 hours      Patient requires continuous monitoring with bedside rhythm monitoring, pulse ox monitoring, and intermittent blood gas analysis. Care plan discussed with ICU care team. Patient remained critical and at risk for life threatening decompensation.     By signing my name below, Janina STANLEY Tiffany, attest that this documentation has been prepared under the direction and in the presence of CLAUDIA Bey  Electronically signed: Emily Roa Scribe, 21 @ 08:24    Luciano STANLEY PA  , personally performed the services described in this documentation. all medical record entries made by the romel were at my direction and in my presence. I have reviewed the chart and agree that the record reflects my personal performance and is accurate and complete  Electronically signed: CLAUDIA Bey        RICKY HAMPTON  MRN-[id number]  Patient is a 66y old  Male who presents with a chief complaint of Anemia, Supratherapeutic INR, Dark Stools (19 Sep 2021 13:34)    HPI:  65M PMH ACC/AHA stage D HF due to NICM HM2 LVAD , TV annuloplasty ring 17 as destination therapy due to severe peripheral artery disease with significant stenosis  SIADH, Depression, CKD-3 with hyperkalemia, past E. coli UTIs, driveline drainage (21) and COVID-19 (back in 2020)  He was recently seen in clinic where he complained of abdominal pain and dark stools w constipation back in May. He presents to [facility] ER today weakness and fatigue, moderate and + Black stools for three days, on coumadin secondary to warfarin use in the setting of an LVAD. Patient has required transfusions for GIB in the past. Mostly recently back in 2021 pt had anemia with dark stools. No interventions was done at that time. However Last Endoscopy was done in 2020 (negative). Today labs show patient is anemic with H/H of 4.5/16.3,. INR is 8.84 MAP in the 90s, Temp 35.1. He denies any chest pain, shortness of breath, dizziness, abd pain, nausea or vomiting.       (2021 16:57)      Surgery/Hospital Course:   admit for melena w/ anemia, INR 8.84   6/15 Capsul study (+) for small bowel bleed, balloon endoscopy (old blood in prox ileum); post EGD - septic w/ L opacity, re-intubated for concern for aspiration, TTE (Mod MR, decrease biV w/ interventricular septum boweing towards R)   bronch    +C Diff    CT C/A/P: Fluid filled colon which may be 2/2 rapid transit. Small bilateral pleffs with associates. Compressive atelectasis New ISABELLE & LLL  parenchymal opacities, suspicious for pneumonia. 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    #8 Shiley trach at bedside    LVAD speed increased to 9200   Bronch   TC since . Patient transferred to SDU.    INR today 2.64.  H&H 7.3/24 this AM.  Will repeat CBC at noon, and will send stool guaiac Patient with persistent abdominal tenderness, rate of tube feeds decreased.  No nausea/vomiting.     INR today 2.4. H&H 9.1/28.6 low flow overnight /N&V, refusing Tube feeds on D5 1/2 normal  @50 cc/hr   INR 2.69  H&H 7.7/.1 refusing Tube feeds on D5 1/2 normal  @50 cc/hr. This am + BM Melena Dr Oneill HF  aware- PRBC x1  GI team consulted -  NPO  plan on study in am-  D/w Dr Cadet Patient  to return to CTU for further management; 1PRBCS    Post op INR 2.2 today.  No bleeding. BC + for SM.  Pt is hypotensive requiring pressor and inotropic support.  ID follow up today on Cefepime will follow.   R PTC for PTX    CT C/A/P: sub q emphysema in R chest wall, GGO RUL, small ascites CTH negative; Abd US: GB thickening, pericholecystic fluid     Perchole drain in place continues to drain total output overnight 133.  Fever today 38.8    duplex LE negative    Patient with persistent abdominal pain, refusing tube feeds and medications, Psych consulted   CTA A/P ordered to r/o mesenteric ischemia 2/2 persistent anorexia, nausea, vomiting. Revealed:  Evaluation of the mesenteric vessels is limited by streak artifact from LVAD. There appears to be severe stenosis of the proximal SMA; abdominal mesenteric doppler is recommended for further evaluation. 2.  No small bowel findings to suggest acute mesenteric ischemia. 3.  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  8/15: Cultures resulted BC 1/2 +GPC in clusters, SC enterobacter; mesenteric duplex: borderline stenosis of proximal SMA  : CT C/A/P noncon: Nondular opacities in R lung apex w/cavitation, abd nl  :  Continue current care, treatment of thyrotoxicosis with medications as per endocrine, d/c ABX as per team.    RUQ sono: Contracted gallbladder with cholecystostomy tube in place.  9/10 failed SMA stent, L fem PSA, s/p 3U PRCB   CTA Abd/Pelvis L RP hematoma     Today: Continue uptitrating TF as tolerated. Ambulation with PT. Continuous TC.     REVIEW OF SYSTEMS:  Gen: No fever  EYES/ENT: No visual changes;  No vertigo or throat pain   NECK: No pain   RES:  No shortness of breath or Cough  CARD: No chest pain   GI: +intermittent abdominal pain  : No dysuria  NEURO: No weakness  SKIN: No itching, rashes       ICU Vital Signs Last 24 Hrs  T(C): 36.7 (20 Sep 2021 08:00), Max: 36.8 (19 Sep 2021 12:00)  T(F): 98 (20 Sep 2021 08:00), Max: 98.2 (19 Sep 2021 12:00)  HR: 85 (20 Sep 2021 08:00) (77 - 95)  BP: --  BP(mean): --  ABP: 92/63 (20 Sep 2021 08:00) (76/58 - 111/80)  ABP(mean): 74 (20 Sep 2021 08:00) (65 - 91)  RR: 27 (20 Sep 2021 08:00) (24 - 65)  SpO2: 100% (20 Sep 2021 08:00) (97% - 100%)      Physical Exam:  Gen:  Awake, alert, NAD  Psych: Mood greatly improved but waxes and wanes, more interactive and participating in care. Would like to be more included in bedside rounding.  CNS:  intact, nonfocal, responds to all commands  Neck: no JVD, +TC   RES : course breath sounds, no wheezing, moderate tan secretions able to cough up    CVS: +LVAD hum   Abd: Soft. Sybil drain with bilious fluid. Positive BS throughout. +L sided abdominal pain  Skin: No rash  Ext:  no edema      ============================I/O===========================   I&O's Detail    19 Sep 2021 07:01  -  20 Sep 2021 07:00  --------------------------------------------------------  IN:    Enteral Tube Flush: 250 mL    IV PiggyBack: 150 mL    Miscellaneous Tube Feedin mL    sodium chloride 0.9%: 180 mL  Total IN: 1380 mL    OUT:    Drain (mL): 510 mL    Emesis (mL): 15 mL    Voided (mL): 650 mL  Total OUT: 1175 mL    Total NET: 205 mL      20 Sep 2021 07:01  -  20 Sep 2021 08:24  --------------------------------------------------------  IN:    Miscellaneous Tube Feedin mL  Total IN: 80 mL    OUT:  Total OUT: 0 mL    Total NET: 80 mL        ============================ LABS =========================                        9.8    10.55 )-----------( 320      ( 20 Sep 2021 00:28 )             31.6     09-20    131<L>  |  97  |  11  ----------------------------<  118<H>  4.4   |  25  |  0.45<L>    Ca    9.1      20 Sep 2021 00:28  Phos  2.9       Mg     2.0         TPro  7.6  /  Alb  2.7<L>  /  TBili  0.8  /  DBili  x   /  AST  26  /  ALT  24  /  AlkPhos  234<H>      LIVER FUNCTIONS - ( 20 Sep 2021 00:28 )  Alb: 2.7 g/dL / Pro: 7.6 g/dL / ALK PHOS: 234 U/L / ALT: 24 U/L / AST: 26 U/L / GGT: x             ABG - ( 20 Sep 2021 00:23 )  pH, Arterial: 7.40  pH, Blood: x     /  pCO2: 50    /  pO2: 159   / HCO3: 31    / Base Excess: 5.3   /  SaO2: 100.0     ======================Micro/Rad/Cardio=================  Culture: Reviewed   CXR: Reviewed  Echo:Reviewed  ======================================================  PAST MEDICAL & SURGICAL HISTORY:  CHF (congestive heart failure)    CAD (coronary artery disease)    Depression    Pleural effusion    History of  novel coronavirus disease (COVID-19)  2020    Hemorrhoids    Bleeding hemorrhoids    Peripheral arterial disease    Claudication    BPH with urinary obstruction    ACC/AHA stage D systolic heart failure    Anticoagulation goal of INR 2.0 to 2.5    Falls    Clavicle fracture    CKD (chronic kidney disease), stage III    Iron deficiency anemia    H/O epistaxis    Vertigo    GI bleed    S/P TVR (tricuspid valve repair)    S/P ventricular assist device    S/P endoscopy      ====================ASSESSMENT ==============  Stage D Nonischemic Cardiomyopathy, Status Post HM2 on 2017    Cardiogenic shock  Hemodynamic instability   Acute hypoxemic respiratory failure s/p trach   GI bleed , Status Post Enteroscopy   Anemia, in setting of melena   Chronic Kidney Disease  Stress hyperglycemia   C.diff positive on    Hypovolemic shock  Septic shock  Leukocytosis  GB thickening/percholecystic s/p perc choley by IT   SMA stenosis  Serratia/citrobacter pneumonia   Stenotrophomonas pneumonia   Enterobacter pneumonia   Nasuea/vomiting  Deconditioning    Plan:  ====================== NEUROLOGY=====================  Nonfocal, continue to monitor neuro status per ICU protocol.   Tylenol PRN for analgesia   C/w mirtazapine and sertraline for depression  Deconditioned, PT/Ambulate as tolerated    acetaminophen   Tablet .. 650 milliGRAM(s) Oral every 6 hours PRN Mild Pain (1 - 3)  mirtazapine 7.5 milliGRAM(s) Oral daily  sertraline 100 milliGRAM(s) Oral daily  ==================== RESPIRATORY======================  Acute hypoxemic respiratory failure s/p #8 betzaida trach on , continue TC as tolerated (TC since )  Continue close monitoring of respiratory rate and breathing pattern, following of ABG’s with A-line monitoring, continuous pulse oximetry monitoring.   Moderate secretions, continue suctioning prn, pulmonary toileting.    ====================CARDIOVASCULAR==================  Stage D Nonischemic Cardiomyopathy, Status Post HM2 on 2017; LVAD settings 9200, flow 5.3  TTE : reveals at least moderate MR, mild AI, severe global LV syst dysfxn, dec RV fxn   Propranolol for rate control of HR 2/2 Hyperthyroidism, titrate as tolerated per Endo  Continue invasive hemodynamic monitoring     propranolol 40 milliGRAM(s) Oral every 8 hours  ===================HEMATOLOGIC/ONC ===================  CTA A/P on  +L RP hematoma   Acute blood loss anemia, monitor H&H/Plts    Holding coumadin and ASA given recurrent GI bleeding    ===================== RENAL =========================  Continue monitoring urine output, I&OS, BUN/Cr   Euvolemic, even fluid balance, currently off diuretics.    Replete lytes PRN. Keep K> 4 and Mg >2   ==================== GASTROINTESTINAL===================  S/p cholecystostomy tube placed on  with IR   CTA A/P : Evaluation of the mesenteric vessels is limited by streak artifact from LVAD. There appears to be severe stenosis of the proximal SMA; abdominal mesenteric doppler is recommended for further evaluation. 2.  No small bowel findings to suggest acute mesenteric ischemia. 3.  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  Mesenteric duplex on 8/15 borderline stenosis of proximal SMA, s/p failed SMA stent, L fem RP hematoma on 9/10, s/p 3U PRBC.   CTA A/P on  +L RP hematoma   Reglan for gut motility  Zofran PRN nausea/vomiting  Pt would likely benefit from a PEJ given persistent gastroparesis and TF intolerance. F/u GI and Gen Surg--> pt does not wish to have PEG placed at certain times, today more open to conversation   Continue Protonix for stress ulcer prophylaxis.     multivitamin 1 Tablet(s) Oral daily  pantoprazole  Injectable 40 milliGRAM(s) IV Push every 12 hours  simethicone 80 milliGRAM(s) Chew every 8 hours PRN Gas  sodium chloride 0.9% lock flush 10 milliLiter(s) IV Push every 1 hour PRN Pre/post blood products, medications, blood draw, and to maintain line patency  sodium chloride 0.9%. 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 - Free T4 remains elevated   Per endocrine      - Thyroid US when trach is out      - Propanolol as above for optimal T4-->T3 conversion      - c/w Methimazole ---> consider changing to rectal if continues to have emesis for better absorption      - s/p hydrocortisone    dextrose 40% Gel 15 Gram(s) Oral once  dextrose 50% Injectable 25 Gram(s) IV Push once  dextrose 50% Injectable 12.5 Gram(s) IV Push once  glucagon  Injectable 1 milliGRAM(s) IntraMuscular once  insulin lispro (ADMELOG) corrective regimen sliding scale   SubCutaneous every 6 hours  methimazole 15 milliGRAM(s) Oral <User Schedule>  ========================INFECTIOUS DISEASE================  Afebrile ; leukocytosis   Continue trending WBC and monitoring fever curve  SCx : +Moderate Serratia marcescens, BCx on 9/15 BGTD   Empiric coverage broadened to Meropenem and Vancomycin    meropenem  IVPB 1000 milliGRAM(s) IV Intermittent every 8 hours  vancomycin    Solution 125 milliGRAM(s) Oral every 6 hours      Patient requires continuous monitoring with bedside rhythm monitoring, pulse ox monitoring, and intermittent blood gas analysis. Care plan discussed with ICU care team. Patient remained critical and at risk for life threatening decompensation.     By signing my name below, IJanina Tiffany, attest that this documentation has been prepared under the direction and in the presence of CLAUDIA Bey  Electronically signed: Emily Roa Scribe, 21 @ 08:24    Luciano STANLEY PA  , personally performed the services described in this documentation. all medical record entries made by the romel were at my direction and in my presence. I have reviewed the chart and agree that the record reflects my personal performance and is accurate and complete  Electronically signed: CLAUDIA Bey

## 2021-09-20 NOTE — PROGRESS NOTE ADULT - SUBJECTIVE AND OBJECTIVE BOX
Subjective: Patient seen resting in bed. ON no issues.     Medications:  acetaminophen   Tablet .. 650 milliGRAM(s) Oral every 6 hours PRN  chlorhexidine 0.12% Liquid 15 milliLiter(s) Oral Mucosa two times a day  chlorhexidine 2% Cloths 1 Application(s) Topical <User Schedule>  dextrose 40% Gel 15 Gram(s) Oral once  dextrose 50% Injectable 25 Gram(s) IV Push once  dextrose 50% Injectable 12.5 Gram(s) IV Push once  glucagon  Injectable 1 milliGRAM(s) IntraMuscular once  insulin lispro (ADMELOG) corrective regimen sliding scale   SubCutaneous every 6 hours  lidocaine   4% Patch 1 Patch Transdermal daily  meropenem  IVPB 1000 milliGRAM(s) IV Intermittent every 8 hours  methimazole 15 milliGRAM(s) Oral <User Schedule>  metoclopramide Injectable 10 milliGRAM(s) IV Push every 8 hours  mirtazapine 7.5 milliGRAM(s) Oral daily  multivitamin 1 Tablet(s) Oral daily  ondansetron Injectable 4 milliGRAM(s) IV Push every 6 hours PRN  pantoprazole  Injectable 40 milliGRAM(s) IV Push every 12 hours  propranolol 40 milliGRAM(s) Oral every 8 hours  sertraline 100 milliGRAM(s) Oral daily  simethicone 80 milliGRAM(s) Chew every 8 hours PRN  sodium chloride 0.9% lock flush 10 milliLiter(s) IV Push every 1 hour PRN  sodium chloride 0.9%. 1000 milliLiter(s) IV Continuous <Continuous>  thiamine 100 milliGRAM(s) Oral daily  vancomycin    Solution 125 milliGRAM(s) Oral every 6 hours      ICU Vital Signs Last 24 Hrs  T(C): 36.6 (20 Sep 2021 12:00), Max: 36.7 (20 Sep 2021 00:00)  T(F): 97.9 (20 Sep 2021 12:00), Max: 98.1 (20 Sep 2021 04:00)  HR: 88 (20 Sep 2021 13:20) (77 - 95)  BP: --  BP(mean): --  ABP: 86/66 (20 Sep 2021 13:20) (76/58 - 111/80)  ABP(mean): 75 (20 Sep 2021 13:20) (64 - 91)  RR: 22 (20 Sep 2021 13:20) (20 - 65)  SpO2: 100% (20 Sep 2021 13:20) (96% - 100%)      Weight in k.9 ( @ 00:00)    I&O's Summary    19 Sep 2021 07:01  -  20 Sep 2021 07:00  --------------------------------------------------------  IN: 1380 mL / OUT: 1175 mL / NET: 205 mL    20 Sep 2021 07:01  -  20 Sep 2021 13:49  --------------------------------------------------------  IN: 405 mL / OUT: 375 mL / NET: 30 mL        Physical Exam  Appearance: No Acute Distress, thin-appearing, lethargic   HEENT: JVP non elevated  Cardiovascular: VAD hum  Respiratory: Clear to auscultation bilaterally  Gastrointestinal: mildly distended, tenderness in LLQ  Neurologic: Non-focal  Extremities: No LE edema, warm and well perfused  Lines/drains: perc dawn drain in place, brownish contents, no bloody contents    LVAD Interrogation  VAD TYPE HM 2  Speed 9200  Flow 5.3  Power 5.6  PI  6.3  Assessment of driveline exit site: driveline dressing c/d/i  Programming changes: no changes made    Labs:                        9.8    10.55 )-----------( 320      ( 20 Sep 2021 00:28 )             31.6     20    131<L>  |  97  |  11  ----------------------------<  118<H>  4.4   |  25  |  0.45<L>    Ca    9.1      20 Sep 2021 00:28  Phos  2.9       Mg     2.0     20    TPro  7.6  /  Alb  2.7<L>  /  TBili  0.8  /  DBili  x   /  AST  26  /  ALT  24  /  AlkPhos  234<H>  0920              Lactate Dehydrogenase, Serum: 372 U/L ( @ 00:28)  Lactate Dehydrogenase, Serum: 350 U/L ( @ 00:25)  Lactate Dehydrogenase, Serum: 323 U/L ( @ 01:27)

## 2021-09-20 NOTE — PROGRESS NOTE ADULT - SUBJECTIVE AND OBJECTIVE BOX
RICKY JOINT  MRN#: [id number]  Subjective:  Pulmonary progress  : recurrent Acute hypoxic respiratory Failure ,aspiration pneumonia, NICM  , chart reviewed and H/O obtained radiological and Laboratory study reviewed patient Examined     65M PMH ACC/AHA stage D HF due to NICM HM2 LVAD , TV annuloplasty ring 17 as destination therapy due to severe peripheral artery disease with significant stenosis  SIADH, Depression, CKD-3 with hyperkalemia, past E. coli UTIs, driveline drainage (21) and COVID-19 (back in 2020)  He was recently seen in clinic where he complained of abdominal pain and dark stools w constipation back in May. He presents to [facility] ER today weakness and fatigue, moderate and + Black stools for three days, on coumadin secondary to warfarin use in the setting of an LVAD. Patient has required transfusions for GIB in the past. Mostly recently back in 2021 pt had anemia with dark stools. No interventions was done at that time. However Last Endoscopy was done in 2020 (negative). Today labs show patient is anemic with H/H of 4.5/16.3,. INR is 8.84 MAP in the 90s, Temp 35.1. He denies any chest pain, shortness of breath, dizziness, abd pain, nausea or vomiting. found to have  rectal bleeding underwent endoscopy ,old blood in the proximal ileum ,  develop sepsis with LL opacity given Antibiotics , Extubated , reintubated , Bronchoscopy on Zosyn for LL pneumonia  and Amiodarone S/P TV Annuloplasty , patient remain intubated on full ventilatory support .S/P multiple units of blood transfusion , remain on full ventilatory support on Precedex and propofol , new central IJ line , diarrhea C diff. +ve on po vancomycin and IV Flagyl,  mildly distended belly , fever start on cefepime 2gm q 8 hrs S/P tracheostomy .  new RT Subclavian central line continue on contact  isolation ,still diarrhea on C-diff antibiotics ENT follow up appreciated , trial of C-PAP as tolerated , , copious secretion from trach. site chest x ray left lower lobe pneumonia , tolerating trch. collar 50% FI02 still excessive secretion need pulmonary toilet , off Ancef antibiotic , no more diarrhea back on full support mechanical ventilator , chest x ray show improvement in LLL air space disease, more awake and responsive on tube feeding no more diarrhea , S/P  Ancef for bacteremia no fever ,ID follow up noted ,  no nausea or vomiting or diarrhea still very weak and tired , event note pulled NG tube now replaced , back on tube feeding ,still on po vancomycin , getting PT and OT at bed side , no plan for decannulation for now. , no more diarrhea receiving PT and OPT at bed side , minimal secretion from tracheostomy site , no SOB getting stronger , improve muscle tone patient transfer to monitor bed still on contact isolation for C-Difficel colitis on 50% FI02, NG tube clogged , and change to resume tube feeding still loose stool . H/H drop significantly require blood transfusion , most likely GI bleeding , IV heparin D/C , vomiting 200 cc of creamy color tube feeding on hold no sob, still melena monitor in the CTU possible capsule endoscopy , H/H is stable ., patient develop TR sided  pneumothorax require chest tube placement , RT IJ central line  placed , develop fever shaking chills , blood culture positive for serratia marcescens , start on cefepime .the patient  become hypoxic and hypotensive placed on full ventilatory support and Vasopressin , levophed and Dobutamine ,S/P blood transfusion on meropenem and vancomycin ,   , on and  off pressors , occasional agitation on Precedex .S/P IR cholecystostomy tube drainage placement in the RT upper Quadrant , resume anticoagulation chest x ray noted C-PAP trail lasted only for 2 hrs , new RT SC line and D/C RT IJ line , RT pig tail cathter has been removed , tolerating C-PAP trial placed on trach. collar 50% FI02 GI consultant noted on NG tube feeding as tolerated , develop AF RVR S/P  bolus Amiodarone  back to regular sinus Rhythm , Flat Affect depressed , back on tube feeding Vital AF at 60 cc/ hr .still intermittent abdominal pain , no fever saturation is accepted  back on full ventilatory support , tired and sleepy on round    .start  back on  tube feeding . tolerating it very well , no nausea or vomiting , good 02 saturation on trac-collar on methimazole for hyperthyroidism , no new C/O no plan for decannulation yet , electrolyte blood test is still pending .on respiratory isolation sputum culture is negative , increase WBC  improved on cefepime , sputum positive for enterococcus , condition is the same ,still C/O Abdominal pain , white count is improving , no chest pain or SOB ,  .placed on Reglan 10 mg TID for gastric motility , depressed , withdrawn. on full NG tube diet with Vital , secretion are much improved , went for mesenteric angiogram , unable to stent SMA S/P 3 units of blood transfusion on trach. collar 40% Fi02, anxious to eat discuss possible decannulation,  H/H  are stable vascular  note appreciated no mediate plan for repeat mesenteric angiogram remain on trach.-collar at 40% FI02, RT IJ in place reassessed for stent in the SMA by vascular, no plan for now depresses and withdrawn on meropenem and po vancomycin for elevated white count pending culture no plan for further vascular study or decannulation  , no events overnight , white count is trending down , dark stool stable H/H     (2021 16:57)    PAST MEDICAL & SURGICAL HISTORY:  CHF (congestive heart failure)    CAD (coronary artery disease)  Depression    Pleural effusion    History of 2019 novel coronavirus disease (COVID-19)  2020    Hemorrhoids    Bleeding hemorrhoids    Peripheral arterial disease    Claudication    BPH with urinary obstruction    ACC/AHA stage D systolic heart failure  Anticoagulation goal of INR 2.0 to 2.5    Falls    Clavicle fracture    CKD (chronic kidney disease), stage III    Iron deficiency anemia    H/O epistaxis    Vertigo    GI bleed    S/P TVR (tricuspid valve repair)    S/P ventricular assist device    S/P endoscopy    OBJECTIVE:  ICU Vital Signs Last 24 Hrs  T(C): 38.2 (2021 10:00), Max: 38.5 (2021 12:00)  T(F): 100.8 (2021 10:00), Max: 101.3 (2021 12:00)  HR: 65 (2021 10:00) (61 - 69)  BP: --  BP(mean): --  ABP: 105/67 (2021 10:00) (90/54 - 113/64)  ABP(mean): 77 (2021 10:00) (63 - 77)  RR: 20 (2021 10:00) (19 - 35)  SpO2: 99% (2021 10:00) (96% - 100%)      -19 @ 07:01  -  06-20 @ 07:00  --------------------------------------------------------  IN: 2693.9 mL / OUT: 1415 mL / NET: 1278.9 mL     @ 07: @ 10:49  --------------------------------------------------------  IN: 420.8 mL / OUT: 115 mL / NET: 305.8 mL  PHYSICAL EXAM:Daily   Elderly male S/P tracheostomy on full ventilatory support  50% FI02 ,  Daily Weight in k.4 (2021 00:00)  HEENT:     + NCAT  + EOMI  - Conjuctival edema   - Icterus   - Thrush   - ETT  + NGT/OGT  Neck:         + FROM  RT IJ  line JVD  - Nodes - Masses + Mid-line trachea + Tracheostomy  Chest:            normal A-P diameter    Lungs:          + CTA   + Rhonchi    - Rales    - Wheezing + Decreased  LT BS   - Dullness R L  Cardiac:       + S1 + S2    + RRR   - Irregular   - S3  - S4    - Murmurs   - Rub   - Hamman’s sign   Abdomen:    + BS  + Soft + Non-tender - Distended - Organomegaly - PEG .cholecystostomy tube in place  Extremities:   - Cyanosis U/L   - Clubbing  U/L  + LE/UE Edema   + Capillary refill    + Pulses   Neuro:        - Awake   -  Alert   - Confused   - Lethargic   + Sedated  + Generalized Weakness  Skin:        - Rashes    - Erythema   + Normal incisions   + IV sites intact          HOSPITAL MEDICATIONS: All medications reviewed and analyzed  MEDICATIONS  (STANDING):  amiodarone    Tablet 200 milliGRAM(s) Oral daily  chlorhexidine 0.12% Liquid 15 milliLiter(s) Oral Mucosa every 12 hours  chlorhexidine 2% Cloths 1 Application(s) Topical <User Schedule>  dexmedetomidine Infusion 0.5 MICROgram(s)/kG/Hr (9.81 mL/Hr) IV Continuous <Continuous>  dextrose 50% Injectable 50 milliLiter(s) IV Push every 15 minutes  heparin  Infusion 400 Unit(s)/Hr (12.5 mL/Hr) IV Continuous <Continuous>  Hydromorphone  Injectable 0.5 milliGRAM(s) IV Push once  insulin lispro (ADMELOG) corrective regimen sliding scale   SubCutaneous every 6 hours  pantoprazole  Injectable 40 milliGRAM(s) IV Push every 12 hours  piperacillin/tazobactam IVPB.. 3.375 Gram(s) IV Intermittent every 8 hours  propofol Infusion 20 MICROgram(s)/kG/Min (9.42 mL/Hr) IV Continuous <Continuous>  sodium chloride 0.9% lock flush 3 milliLiter(s) IV Push every 8 hours  sodium chloride 0.9%. 1000 milliLiter(s) (10 mL/Hr) IV Continuous <Continuous>    MEDICATIONS  (PRN):  acetaminophen    Suspension .. 650 milliGRAM(s) Oral every 6 hours PRN Temp greater or equal to 38C (100.4F)    LABS: All Lab data reviewed and analyzed                                     9.8    10.55 )-----------( 320      ( 20 Sep 2021 00:28 )             31.6       131<L>  |  97  |  11  ----------------------------<  118<H>  4.4   |  25  |  0.45<L>    Ca    9.1      20 Sep 2021 00:28  Phos  2.9     -  Mg     2.0         TPro  7.6  /  Alb  2.7<L>  /  TBili  0.8  /  DBili  x   /  AST  26  /  ALT  24  /  AlkPhos  234<H>        Ca    9.5      19 Sep 2021 00:25  Phos  2.9     -  Mg     1.7         TPro  7.3  /  Alb  2.8<L>  /  TBili  1.1  /  DBili  x   /  AST  19  /  ALT  22  /  AlkPhos  252<H>      Ca    9.5      18 Sep 2021 01:27  Phos  3.2     -  Mg     1.8         TPro  7.0  /  Alb  2.7<L>  /  TBili  1.2  /  DBili  x   /  AST  16  /  ALT  23  /  AlkPhos  239<H>      Ca    9.4      17 Sep 2021 00:51  Phos  3.5     -  Mg     1.8         TPro  7.3  /  Alb  2.5<L>  /  TBili  2.3<H>  /  DBili  x   /  AST  16  /  ALT  27  /  AlkPhos  234<H>        Ca    9.4      16 Sep 2021 01:02  Phos  2.8       Mg     1.7         TPro  7.0  /  Alb  2.7<L>  /  TBili  2.0<H>  /  DBili  x   /  AST  16  /  ALT  35  /  AlkPhos  242<H>      Ca    9.6      15 Sep 2021 00:36  Phos  3.5     09-15  Mg     1.8     09-15    TPro  7.0  /  Alb  3.0<L>  /  TBili  2.4<H>  /  DBili  x   /  AST  23  /  ALT  52<H>  /  AlkPhos  258<H>  09-15      Ca    8.5      14 Sep 2021 00:33  Phos  2.6       Mg     1.6         TPro  6.0  /  Alb  2.8<L>  /  TBili  1.2  /  DBili  x   /  AST  36  /  ALT  60<H>  /  AlkPhos  215<H>        Ca    9.2      13 Sep 2021 01:03  Phos  3.0       Mg     1.7         TPro  6.5  /  Alb  2.8<L>  /  TBili  0.6  /  DBili  x   /  AST  33  /  ALT  75<H>  /  AlkPhos  201<H>                                                                                                                                            PTT - ( 2021 04:52 )  PTT:45.2 sec LIVER FUNCTIONS - ( 2021 00:42 )  Alb: 3.4 g/dL / Pro: 6.7 g/dL / ALK PHOS: 213 U/L / ALT: 15 U/L / AST: 24 U/L / GGT: x           RADIOLOGY: - Reviewed and analyzed RT Pig tail cathter  , LVAD HM2, CT scan of abdomen reviewed result noted

## 2021-09-21 NOTE — PROGRESS NOTE ADULT - ATTENDING COMMENTS
Restated on tube feeds and tolerating tube feeds but is complaining of some abdominal pain. Will engage with general surgery regarding plan for cholecystotomy tube. After finalizing plan with gen surgery, will decide on how to proceed with PEG tube. Fevers and WBC have resolved now on meropenam and vancomycin. Low threshold to repeat CT Chest/Abdomen/Pelvis.

## 2021-09-21 NOTE — PROGRESS NOTE ADULT - SUBJECTIVE AND OBJECTIVE BOX
RICKY HAMPTON  MRN-[id number]  Patient is a 66y old  Male who presents with a chief complaint of Anemia, Supratherapeutic INR, Dark Stools (21 Sep 2021 11:27)    HPI:  65M PMH ACC/AHA stage D HF due to NICM HM2 LVAD , TV annuloplasty ring 17 as destination therapy due to severe peripheral artery disease with significant stenosis  SIADH, Depression, CKD-3 with hyperkalemia, past E. coli UTIs, driveline drainage (21) and COVID-19 (back in 2020)  He was recently seen in clinic where he complained of abdominal pain and dark stools w constipation back in May. He presents to [facility] ER today weakness and fatigue, moderate and + Black stools for three days, on coumadin secondary to warfarin use in the setting of an LVAD. Patient has required transfusions for GIB in the past. Mostly recently back in 2021 pt had anemia with dark stools. No interventions was done at that time. However Last Endoscopy was done in 2020 (negative). Today labs show patient is anemic with H/H of 4.5/16.3,. INR is 8.84 MAP in the 90s, Temp 35.1. He denies any chest pain, shortness of breath, dizziness, abd pain, nausea or vomiting.       (2021 16:57)      Surgery/Hospital Course:   admit for melena w/ anemia, INR 8.84   6/15 Capsul study (+) for small bowel bleed, balloon endoscopy (old blood in prox ileum); post EGD - septic w/ L opacity, re-intubated for concern for aspiration, TTE (Mod MR, decrease biV w/ interventricular septum boweing towards R)   bronch    +C Diff    CT C/A/P: Fluid filled colon which may be 2/2 rapid transit. Small bilateral pleffs with associates. Compressive atelectasis New ISABELLE & LLL  parenchymal opacities, suspicious for pneumonia. 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    #8 Shiley trach at bedside    LVAD speed increased to 9200   Bronch   TC since . Patient transferred to SDU.    INR today 2.64.  H&H 7.3/24 this AM.  Will repeat CBC at noon, and will send stool guaiac Patient with persistent abdominal tenderness, rate of tube feeds decreased.  No nausea/vomiting.     INR today 2.4. H&H 9.1/28.6 low flow overnight /N&V, refusing Tube feeds on D5 1/2 normal  @50 cc/hr   INR 2.69  H&H 7.7/.1 refusing Tube feeds on D5 1/2 normal  @50 cc/hr. This am + BM Melena Dr Oneill HF  aware- PRBC x1  GI team consulted -  NPO  plan on study in am-  D/w Dr Cadet Patient  to return to CTU for further management; 1PRBCS    Post op INR 2.2 today.  No bleeding. BC + for SM.  Pt is hypotensive requiring pressor and inotropic support.  ID follow up today on Cefepime will follow.   R PTC for PTX    CT C/A/P: sub q emphysema in R chest wall, GGO RUL, small ascites CTH negative; Abd US: GB thickening, pericholecystic fluid     Perchole drain in place continues to drain total output overnight 133.  Fever today 38.8    duplex LE negative    Patient with persistent abdominal pain, refusing tube feeds and medications, Psych consulted   CTA A/P ordered to r/o mesenteric ischemia 2/2 persistent anorexia, nausea, vomiting. Revealed:  Evaluation of the mesenteric vessels is limited by streak artifact from LVAD. There appears to be severe stenosis of the proximal SMA; abdominal mesenteric doppler is recommended for further evaluation. 2.  No small bowel findings to suggest acute mesenteric ischemia. 3.  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  8/15: Cultures resulted BC 1/2 +GPC in clusters, SC enterobacter; mesenteric duplex: borderline stenosis of proximal SMA  : CT C/A/P noncon: Nondular opacities in R lung apex w/cavitation, abd nl  :  Continue current care, treatment of thyrotoxicosis with medications as per endocrine, d/c ABX as per team.    RUQ sono: Contracted gallbladder with cholecystostomy tube in place.  9/10 failed SMA stent, L fem PSA, s/p 3U PRCB   CTA Abd/Pelvis L RP hematoma     Today:    REVIEW OF SYSTEMS:  Gen: No fever  EYES/ENT: No visual changes;  No vertigo or throat pain   NECK: No pain   RES:  No shortness of breath or Cough  CARD: No chest pain   GI: +intermittent abdominal pain  : No dysuria  NEURO: No weakness  SKIN: No itching, rashes     ICU Vital Signs Last 24 Hrs  T(C): 36.6 (21 Sep 2021 12:00), Max: 36.7 (20 Sep 2021 16:00)  T(F): 97.9 (21 Sep 2021 12:00), Max: 98.1 (21 Sep 2021 00:00)  HR: 88 (21 Sep 2021 12:00) (79 - 97)  BP: --  BP(mean): --  ABP: 84/63 (21 Sep 2021 12:00) (77/60 - 102/74)  ABP(mean): 72 (21 Sep 2021 12:00) (67 - 88)  RR: 20 (21 Sep 2021 12:00) (10 - 46)  SpO2: 96% (21 Sep 2021 12:00) (94% - 100%)      Physical Exam:  Gen:  Awake, alert, NAD  Psych: Mood greatly improved but waxes and wanes, more interactive and participating in care. Would like to be more included in bedside rounding.  CNS:  intact, nonfocal, responds to all commands  Neck: no JVD, +TC   RES : course breath sounds, no wheezing, moderate tan secretions able to cough up    CVS: +LVAD hum   Abd: Soft. Sybil drain with bilious fluid. Positive BS throughout. +L sided abdominal pain  Skin: No rash  Ext:  no edema    ============================I/O===========================   I&O's Detail    20 Sep 2021 07:01  -  21 Sep 2021 07:00  --------------------------------------------------------  IN:    Enteral Tube Flush: 210 mL    IV PiggyBack: 100 mL    Miscellaneous Tube Feedin mL    sodium chloride 0.9%: 120 mL  Total IN: 1395 mL    OUT:    Drain (mL): 475 mL    Voided (mL): 900 mL  Total OUT: 1375 mL    Total NET: 20 mL      21 Sep 2021 07:01  -  21 Sep 2021 12:38  --------------------------------------------------------  IN:    Enteral Tube Flush: 40 mL    Miscellaneous Tube Feedin mL    sodium chloride 0.9%: 30 mL  Total IN: 340 mL    OUT:    Voided (mL): 350 mL  Total OUT: 350 mL    Total NET: -10 mL        ============================ LABS =========================                        9.6    10.30 )-----------( 346      ( 21 Sep 2021 00:32 )             30.7         131<L>  |  97  |  11  ----------------------------<  106<H>  4.6   |  27  |  0.40<L>    Ca    9.3      21 Sep 2021 00:24  Phos  3.2       Mg     1.8         TPro  7.7  /  Alb  2.9<L>  /  TBili  0.8  /  DBili  x   /  AST  26  /  ALT  28  /  AlkPhos  230<H>      LIVER FUNCTIONS - ( 21 Sep 2021 00:24 )  Alb: 2.9 g/dL / Pro: 7.7 g/dL / ALK PHOS: 230 U/L / ALT: 28 U/L / AST: 26 U/L / GGT: x             ABG - ( 21 Sep 2021 00:13 )  pH, Arterial: 7.45  pH, Blood: x     /  pCO2: 43    /  pO2: 145   / HCO3: 30    / Base Excess: 5.3   /  SaO2: 99.8                ======================Micro/Rad/Cardio=================  Culture: Reviewed   CXR: Reviewed  Echo:Reviewed  ======================================================  PAST MEDICAL & SURGICAL HISTORY:  CHF (congestive heart failure)    CAD (coronary artery disease)    Depression    Pleural effusion    History of  novel coronavirus disease (COVID-19)  2020    Hemorrhoids    Bleeding hemorrhoids    Peripheral arterial disease    Claudication    BPH with urinary obstruction    ACC/AHA stage D systolic heart failure    Anticoagulation goal of INR 2.0 to 2.5    Falls    Clavicle fracture    CKD (chronic kidney disease), stage III    Iron deficiency anemia    H/O epistaxis    Vertigo    GI bleed    S/P TVR (tricuspid valve repair)    S/P ventricular assist device    S/P endoscopy      ====================ASSESSMENT ==============  Stage D Nonischemic Cardiomyopathy, Status Post HM2 on 2017    Cardiogenic shock  Hemodynamic instability   Acute hypoxemic respiratory failure s/p trach   GI bleed , Status Post Enteroscopy   Anemia, in setting of melena   Chronic Kidney Disease  Stress hyperglycemia   C.diff positive on    Hypovolemic shock  Septic shock  Leukocytosis  GB thickening/percholecystic s/p perc choley by IT   SMA stenosis  Serratia/citrobacter pneumonia   Stenotrophomonas pneumonia   Enterobacter pneumonia   Nasuea/vomiting  Deconditioning    Plan:  ====================== NEUROLOGY=====================  Nonfocal, continue to monitor neuro status per ICU protocol.   Tylenol PRN for analgesia   C/w mirtazapine and sertraline for depression  Deconditioned, PT/Ambulate as tolerated    acetaminophen   Tablet .. 650 milliGRAM(s) Oral every 6 hours PRN Mild Pain (1 - 3)  mirtazapine 7.5 milliGRAM(s) Oral daily  sertraline 100 milliGRAM(s) Oral daily    ==================== RESPIRATORY======================  Acute hypoxemic respiratory failure s/p #8 betzaida pritchard on , continue TC as tolerated (TC since )  Continue close monitoring of respiratory rate and breathing pattern, following of ABG’s with A-line monitoring, continuous pulse oximetry monitoring.   Moderate secretions, continue suctioning prn, pulmonary toileting.       ====================CARDIOVASCULAR==================  Stage D Nonischemic Cardiomyopathy, Status Post HM2 on 2017; LVAD settings 9200, flow 5.3  TTE : reveals at least moderate MR, mild AI, severe global LV syst dysfxn, dec RV fxn   Propranolol for rate control of HR 2/2 Hyperthyroidism, titrate as tolerated per Endo  Continue invasive hemodynamic monitoring     propranolol 40 milliGRAM(s) Oral every 8 hours    ===================HEMATOLOGIC/ONC ===================  CTA A/P on  +L RP hematoma   Acute blood loss anemia, monitor H&H/Plts    Holding coumadin and ASA given recurrent GI bleeding    ===================== RENAL =========================  Continue monitoring urine output, I&OS, BUN/Cr   Euvolemic, even fluid balance, currently off diuretics.    Replete lytes PRN. Keep K> 4 and Mg >2     ==================== GASTROINTESTINAL===================  S/p cholecystostomy tube placed on  with IR   CTA A/P : Evaluation of the mesenteric vessels is limited by streak artifact from LVAD. There appears to be severe stenosis of the proximal SMA; abdominal mesenteric doppler is recommended for further evaluation. 2.  No small bowel findings to suggest acute mesenteric ischemia. 3.  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  Mesenteric duplex on 8/15 borderline stenosis of proximal SMA, s/p failed SMA stent, L fem RP hematoma on 9/10, s/p 3U PRBC.   CTA A/P on  +L RP hematoma   Reglan for gut motility  Zofran PRN nausea/vomiting  Pt would likely benefit from a PEJ given persistent gastroparesis and TF intolerance. F/u GI and Gen Surg--> pt does not wish to have PEG placed at certain times, today more open to conversation   Continue Protonix for stress ulcer prophylaxis.       metoclopramide Injectable 10 milliGRAM(s) IV Push every 8 hours  multivitamin 1 Tablet(s) Oral daily  GI prophylaxis, pantoprazole  Injectable 40 milliGRAM(s) IV Push every 12 hours  simethicone 80 milliGRAM(s) Chew every 8 hours PRN Gas  sodium chloride 0.9% lock flush 10 milliLiter(s) IV Push every 1 hour PRN Pre/post blood products, medications, blood draw, and to maintain line patency  sodium chloride 0.9%. 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 - Free T4 remains elevated   Per endocrine      - Thyroid US when trach is out      - Propanolol as above for optimal T4-->T3 conversion      - c/w Methimazole ---> consider changing to rectal if continues to have emesis for better absorption      - s/p hydrocortisone    dextrose 40% Gel 15 Gram(s) Oral once  dextrose 50% Injectable 25 Gram(s) IV Push once  dextrose 50% Injectable 12.5 Gram(s) IV Push once  glucagon  Injectable 1 milliGRAM(s) IntraMuscular once  insulin lispro (ADMELOG) corrective regimen sliding scale   SubCutaneous every 6 hours  methimazole 15 milliGRAM(s) Oral <User Schedule>    ========================INFECTIOUS DISEASE================  Afebrile ; leukocytosis   Continue trending WBC and monitoring fever curve  SCx : +Moderate Serratia marcescens, BCx on 9/15 BGTD   Empiric coverage broadened to Meropenem and Vancomycin    meropenem  IVPB 1000 milliGRAM(s) IV Intermittent every 8 hours  vancomycin    Solution 125 milliGRAM(s) Oral every 6 hours      Patient requires continuous monitoring with bedside rhythm monitoring, pulse ox monitoring, and intermittent blood gas analysis. Care plan discussed with ICU care team. Patient remained critical and at risk for life threatening decompensation.     By signing my name below, I, Aparna Saleh, attest that this documentation has been prepared under the direction and in the presence of CLAUDIA Goldstein   Electronically signed: Cesia Villegas, 21 @ 12:38    I, Cristóbal Cisneros, personally performed the services described in this documentation. all medical record entries made by the scribe were at my direction and in my presence. I have reviewed the chart and agree that the record reflects my personal performance and is accurate and complete  Electronically signed: CLAUDIA Goldstein        RICKY HAMPTON  MRN-[id number]  Patient is a 66y old  Male who presents with a chief complaint of Anemia, Supratherapeutic INR, Dark Stools (21 Sep 2021 11:27)    HPI:  65M PMH ACC/AHA stage D HF due to NICM HM2 LVAD , TV annuloplasty ring 17 as destination therapy due to severe peripheral artery disease with significant stenosis  SIADH, Depression, CKD-3 with hyperkalemia, past E. coli UTIs, driveline drainage (21) and COVID-19 (back in 2020)  He was recently seen in clinic where he complained of abdominal pain and dark stools w constipation back in May. He presents to [facility] ER today weakness and fatigue, moderate and + Black stools for three days, on coumadin secondary to warfarin use in the setting of an LVAD. Patient has required transfusions for GIB in the past. Mostly recently back in 2021 pt had anemia with dark stools. No interventions was done at that time. However Last Endoscopy was done in 2020 (negative). Today labs show patient is anemic with H/H of 4.5/16.3,. INR is 8.84 MAP in the 90s, Temp 35.1. He denies any chest pain, shortness of breath, dizziness, abd pain, nausea or vomiting.       (2021 16:57)      Surgery/Hospital Course:   admit for melena w/ anemia, INR 8.84   6/15 Capsul study (+) for small bowel bleed, balloon endoscopy (old blood in prox ileum); post EGD - septic w/ L opacity, re-intubated for concern for aspiration, TTE (Mod MR, decrease biV w/ interventricular septum boweing towards R)   bronch    +C Diff    CT C/A/P: Fluid filled colon which may be 2/2 rapid transit. Small bilateral pleffs with associates. Compressive atelectasis New ISABELLE & LLL  parenchymal opacities, suspicious for pneumonia. 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    #8 Shiley trach at bedside    LVAD speed increased to 9200   Bronch   TC since . Patient transferred to SDU.    INR today 2.64.  H&H 7.3/24 this AM.  Will repeat CBC at noon, and will send stool guaiac Patient with persistent abdominal tenderness, rate of tube feeds decreased.  No nausea/vomiting.     INR today 2.4. H&H 9.1/28.6 low flow overnight /N&V, refusing Tube feeds on D5 1/2 normal  @50 cc/hr   INR 2.69  H&H 7.7/.1 refusing Tube feeds on D5 1/2 normal  @50 cc/hr. This am + BM Melena Dr Oneill HF  aware- PRBC x1  GI team consulted -  NPO  plan on study in am-  D/w Dr Cadet Patient  to return to CTU for further management; 1PRBCS    Post op INR 2.2 today.  No bleeding. BC + for SM.  Pt is hypotensive requiring pressor and inotropic support.  ID follow up today on Cefepime will follow.   R PTC for PTX    CT C/A/P: sub q emphysema in R chest wall, GGO RUL, small ascites CTH negative; Abd US: GB thickening, pericholecystic fluid     Perchole drain in place continues to drain total output overnight 133.  Fever today 38.8    duplex LE negative    Patient with persistent abdominal pain, refusing tube feeds and medications, Psych consulted   CTA A/P ordered to r/o mesenteric ischemia 2/2 persistent anorexia, nausea, vomiting. Revealed:  Evaluation of the mesenteric vessels is limited by streak artifact from LVAD. There appears to be severe stenosis of the proximal SMA; abdominal mesenteric doppler is recommended for further evaluation. 2.  No small bowel findings to suggest acute mesenteric ischemia. 3.  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  8/15: Cultures resulted BC 1/2 +GPC in clusters, SC enterobacter; mesenteric duplex: borderline stenosis of proximal SMA  : CT C/A/P noncon: Nondular opacities in R lung apex w/cavitation, abd nl  :  Continue current care, treatment of thyrotoxicosis with medications as per endocrine, d/c ABX as per team.    RUQ sono: Contracted gallbladder with cholecystostomy tube in place.  9/10 failed SMA stent, L fem PSA, s/p 3U PRCB   CTA Abd/Pelvis L RP hematoma     Today:   Plan to start 8 day course of Meropenem because of pneumonia aspiration. Continue ambulation as tolerated. Increased feeds to 45cc/hr this AM.     REVIEW OF SYSTEMS:  Gen: No fever  EYES/ENT: No visual changes;  No vertigo or throat pain   NECK: No pain   RES:  No shortness of breath or Cough  CARD: No chest pain   GI: +intermittent abdominal pain  : No dysuria  NEURO: No weakness  SKIN: No itching, rashes     ICU Vital Signs Last 24 Hrs  T(C): 36.6 (21 Sep 2021 12:00), Max: 36.7 (20 Sep 2021 16:00)  T(F): 97.9 (21 Sep 2021 12:00), Max: 98.1 (21 Sep 2021 00:00)  HR: 88 (21 Sep 2021 12:00) (79 - 97)  BP: --  BP(mean): --  ABP: 84/63 (21 Sep 2021 12:00) (77/60 - 102/74)  ABP(mean): 72 (21 Sep 2021 12:00) (67 - 88)  RR: 20 (21 Sep 2021 12:00) (10 - 46)  SpO2: 96% (21 Sep 2021 12:00) (94% - 100%)      Physical Exam:  Gen:  Awake, alert, NAD  Psych: Mood greatly improved but waxes and wanes, more interactive and participating in care. Would like to be more included in bedside rounding.  CNS:  intact, nonfocal, responds to all commands  Neck: no JVD, +TC   RES : course breath sounds, no wheezing, moderate tan secretions able to cough up    CVS: +LVAD hum   Abd: Soft. Sybil drain with bilious fluid. Positive BS throughout. +L sided abdominal pain  Skin: No rash  Ext:  no edema    ============================I/O===========================   I&O's Detail    20 Sep 2021 07:01  -  21 Sep 2021 07:00  --------------------------------------------------------  IN:    Enteral Tube Flush: 210 mL    IV PiggyBack: 100 mL    Miscellaneous Tube Feedin mL    sodium chloride 0.9%: 120 mL  Total IN: 1395 mL    OUT:    Drain (mL): 475 mL    Voided (mL): 900 mL  Total OUT: 1375 mL    Total NET: 20 mL      21 Sep 2021 07:01  -  21 Sep 2021 12:38  --------------------------------------------------------  IN:    Enteral Tube Flush: 40 mL    Miscellaneous Tube Feedin mL    sodium chloride 0.9%: 30 mL  Total IN: 340 mL    OUT:    Voided (mL): 350 mL  Total OUT: 350 mL    Total NET: -10 mL        ============================ LABS =========================                        9.6    10.30 )-----------( 346      ( 21 Sep 2021 00:32 )             30.7         131<L>  |  97  |  11  ----------------------------<  106<H>  4.6   |  27  |  0.40<L>    Ca    9.3      21 Sep 2021 00:24  Phos  3.2       Mg     1.8         TPro  7.7  /  Alb  2.9<L>  /  TBili  0.8  /  DBili  x   /  AST  26  /  ALT  28  /  AlkPhos  230<H>      LIVER FUNCTIONS - ( 21 Sep 2021 00:24 )  Alb: 2.9 g/dL / Pro: 7.7 g/dL / ALK PHOS: 230 U/L / ALT: 28 U/L / AST: 26 U/L / GGT: x             ABG - ( 21 Sep 2021 00:13 )  pH, Arterial: 7.45  pH, Blood: x     /  pCO2: 43    /  pO2: 145   / HCO3: 30    / Base Excess: 5.3   /  SaO2: 99.8                ======================Micro/Rad/Cardio=================  Culture: Reviewed   CXR: Reviewed  Echo:Reviewed  ======================================================  PAST MEDICAL & SURGICAL HISTORY:  CHF (congestive heart failure)    CAD (coronary artery disease)    Depression    Pleural effusion    History of  novel coronavirus disease (COVID-19)  2020    Hemorrhoids    Bleeding hemorrhoids    Peripheral arterial disease    Claudication    BPH with urinary obstruction    ACC/AHA stage D systolic heart failure    Anticoagulation goal of INR 2.0 to 2.5    Falls    Clavicle fracture    CKD (chronic kidney disease), stage III    Iron deficiency anemia    H/O epistaxis    Vertigo    GI bleed    S/P TVR (tricuspid valve repair)    S/P ventricular assist device    S/P endoscopy      ====================ASSESSMENT ==============  Stage D Nonischemic Cardiomyopathy, Status Post HM2 on 2017    Cardiogenic shock  Hemodynamic instability   Acute hypoxemic respiratory failure s/p trach   GI bleed , Status Post Enteroscopy   Anemia, in setting of melena   Chronic Kidney Disease  Stress hyperglycemia   C.diff positive on    Hypovolemic shock  Septic shock  Leukocytosis  GB thickening/percholecystic s/p perc choley by IT   SMA stenosis  Serratia/citrobacter pneumonia   Stenotrophomonas pneumonia   Enterobacter pneumonia   Nasuea/vomiting  Deconditioning    Plan:  ====================== NEUROLOGY=====================  Nonfocal, continue to monitor neuro status per ICU protocol.   Continue ambulation as tolerated.   Tylenol PRN for analgesia   C/w mirtazapine and sertraline for depression  Deconditioned, PT/Ambulate as tolerated    acetaminophen   Tablet .. 650 milliGRAM(s) Oral every 6 hours PRN Mild Pain (1 - 3)  mirtazapine 7.5 milliGRAM(s) Oral daily  sertraline 100 milliGRAM(s) Oral daily    ==================== RESPIRATORY======================  Acute hypoxemic respiratory failure s/p #8 shiley trach on , continue TC as tolerated (TC since )  Continue close monitoring of respiratory rate and breathing pattern, following of ABG’s with A-line monitoring, continuous pulse oximetry monitoring.   Moderate secretions, continue suctioning prn, pulmonary toileting.       ====================CARDIOVASCULAR==================  Stage D Nonischemic Cardiomyopathy, Status Post HM2 on 2017; LVAD settings 9200, flow 5.3  TTE : reveals at least moderate MR, mild AI, severe global LV syst dysfxn, dec RV fxn   Propranolol for rate control of HR 2/2 Hyperthyroidism, titrate as tolerated per Endo  Continue invasive hemodynamic monitoring     propranolol 40 milliGRAM(s) Oral every 8 hours    ===================HEMATOLOGIC/ONC ===================  CTA A/P on  +L RP hematoma   Acute blood loss anemia, monitor H&H/Plts    Holding coumadin and ASA given recurrent GI bleeding    ===================== RENAL =========================  Continue monitoring urine output, I&OS, BUN/Cr   Euvolemic, even fluid balance, currently off diuretics.    Replete lytes PRN. Keep K> 4 and Mg >2     ==================== GASTROINTESTINAL===================  S/p cholecystostomy tube placed on  with IR , Increased feeds to 45cc/hr this AM.   CTA A/P : Evaluation of the mesenteric vessels is limited by streak artifact from LVAD. There appears to be severe stenosis of the proximal SMA; abdominal mesenteric doppler is recommended for further evaluation. 2.  No small bowel findings to suggest acute mesenteric ischemia. 3.  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  Mesenteric duplex on 8/15 borderline stenosis of proximal SMA, s/p failed SMA stent, L fem RP hematoma on 9/10, s/p 3U PRBC.   CTA A/P on  +L RP hematoma   Reglan for gut motility  Zofran PRN nausea/vomiting  Pt would likely benefit from a PEJ given persistent gastroparesis and TF intolerance. F/u GI and Gen Surg--> pt does not wish to have PEG placed at certain times, today more open to conversation   Continue Protonix for stress ulcer prophylaxis.       metoclopramide Injectable 10 milliGRAM(s) IV Push every 8 hours  multivitamin 1 Tablet(s) Oral daily  GI prophylaxis, pantoprazole  Injectable 40 milliGRAM(s) IV Push every 12 hours  simethicone 80 milliGRAM(s) Chew every 8 hours PRN Gas  sodium chloride 0.9% lock flush 10 milliLiter(s) IV Push every 1 hour PRN Pre/post blood products, medications, blood draw, and to maintain line patency  sodium chloride 0.9%. 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 - Free T4 remains elevated   Per endocrine      - Thyroid US when trach is out      - Propanolol as above for optimal T4-->T3 conversion      - c/w Methimazole ---> consider changing to rectal if continues to have emesis for better absorption      - s/p hydrocortisone    dextrose 40% Gel 15 Gram(s) Oral once  dextrose 50% Injectable 25 Gram(s) IV Push once  dextrose 50% Injectable 12.5 Gram(s) IV Push once  glucagon  Injectable 1 milliGRAM(s) IntraMuscular once  insulin lispro (ADMELOG) corrective regimen sliding scale   SubCutaneous every 6 hours  methimazole 15 milliGRAM(s) Oral <User Schedule>    ========================INFECTIOUS DISEASE================  Afebrile ; leukocytosis   Continue trending WBC and monitoring fever curve  SCx : +Moderate Serratia marcescens, BCx on 9/15 BGTD   Empiric coverage with Vancomycin  Plan to start 8 day course of Meropenem because of pneumonia aspiration.    meropenem  IVPB 1000 milliGRAM(s) IV Intermittent every 8 hours  vancomycin    Solution 125 milliGRAM(s) Oral every 6 hours      Patient requires continuous monitoring with bedside rhythm monitoring, pulse ox monitoring, and intermittent blood gas analysis. Care plan discussed with ICU care team. Patient remained critical and at risk for life threatening decompensation.     By signing my name below, I, Aparna Saleh, attest that this documentation has been prepared under the direction and in the presence of CLAUDIA Goldstein   Electronically signed: Cesia Villegas, 21 @ 12:38    I, Cristóbal Cisneros, personally performed the services described in this documentation. all medical record entries made by the scribe were at my direction and in my presence. I have reviewed the chart and agree that the record reflects my personal performance and is accurate and complete  Electronically signed: CLAUDIA Goldstein        RICKY HAMPTON  MRN-[id number]  Patient is a 66y old  Male who presents with a chief complaint of Anemia, Supratherapeutic INR, Dark Stools (21 Sep 2021 11:27)    HPI:  65M PMH ACC/AHA stage D HF due to NICM HM2 LVAD , TV annuloplasty ring 17 as destination therapy due to severe peripheral artery disease with significant stenosis  SIADH, Depression, CKD-3 with hyperkalemia, past E. coli UTIs, driveline drainage (21) and COVID-19 (back in 2020)  He was recently seen in clinic where he complained of abdominal pain and dark stools w constipation back in May. He presents to [facility] ER today weakness and fatigue, moderate and + Black stools for three days, on coumadin secondary to warfarin use in the setting of an LVAD. Patient has required transfusions for GIB in the past. Mostly recently back in 2021 pt had anemia with dark stools. No interventions was done at that time. However Last Endoscopy was done in 2020 (negative). Today labs show patient is anemic with H/H of 4.5/16.3,. INR is 8.84 MAP in the 90s, Temp 35.1. He denies any chest pain, shortness of breath, dizziness, abd pain, nausea or vomiting.       (2021 16:57)      Surgery/Hospital Course:   admit for melena w/ anemia, INR 8.84   6/15 Capsul study (+) for small bowel bleed, balloon endoscopy (old blood in prox ileum); post EGD - septic w/ L opacity, re-intubated for concern for aspiration, TTE (Mod MR, decrease biV w/ interventricular septum boweing towards R)   bronch    +C Diff    CT C/A/P: Fluid filled colon which may be 2/2 rapid transit. Small bilateral pleffs with associates. Compressive atelectasis New ISABELLE & LLL  parenchymal opacities, suspicious for pneumonia. 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    #8 Shiley trach at bedside    LVAD speed increased to 9200   Bronch   TC since . Patient transferred to SDU.    INR today 2.64.  H&H 7.3/24 this AM.  Will repeat CBC at noon, and will send stool guaiac Patient with persistent abdominal tenderness, rate of tube feeds decreased.  No nausea/vomiting.     INR today 2.4. H&H 9.1/28.6 low flow overnight /N&V, refusing Tube feeds on D5 1/2 normal  @50 cc/hr   INR 2.69  H&H 7.7/.1 refusing Tube feeds on D5 1/2 normal  @50 cc/hr. This am + BM Melena Dr Oneill HF  aware- PRBC x1  GI team consulted -  NPO  plan on study in am-  D/w Dr Cadet Patient  to return to CTU for further management; 1PRBCS    Post op INR 2.2 today.  No bleeding. BC + for SM.  Pt is hypotensive requiring pressor and inotropic support.  ID follow up today on Cefepime will follow.   R PTC for PTX    CT C/A/P: sub q emphysema in R chest wall, GGO RUL, small ascites CTH negative; Abd US: GB thickening, pericholecystic fluid     Perchole drain in place continues to drain total output overnight 133.  Fever today 38.8    duplex LE negative    Patient with persistent abdominal pain, refusing tube feeds and medications, Psych consulted   CTA A/P ordered to r/o mesenteric ischemia 2/2 persistent anorexia, nausea, vomiting. Revealed:  Evaluation of the mesenteric vessels is limited by streak artifact from LVAD. There appears to be severe stenosis of the proximal SMA; abdominal mesenteric doppler is recommended for further evaluation. 2.  No small bowel findings to suggest acute mesenteric ischemia. 3.  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  8/15: Cultures resulted BC 1/2 +GPC in clusters, SC enterobacter; mesenteric duplex: borderline stenosis of proximal SMA  : CT C/A/P noncon: Nondular opacities in R lung apex w/cavitation, abd nl  :  Continue current care, treatment of thyrotoxicosis with medications as per endocrine, d/c ABX as per team.    RUQ sono: Contracted gallbladder with cholecystostomy tube in place.  9/10 failed SMA stent, L fem PSA, s/p 3U PRCB   CTA Abd/Pelvis L RP hematoma     Today:   - Tube feeds increased to 45cc/hr this AM. Dr. Azar from general surgery spoke with Dr. Cadet regarding planning for possible PEJ, cholecystostomy tube removal with possible cholecystectomy to improve tolerating tube feeds, decrease N/V and abdominal pain. Per Dr. Cadet, the PEJ is of more importance. However, the patient is adamantly refusing everything and has the capacity to make decisions. Ongoing discussions will be had tomorrow regarding the plan moving forward. Methimazole adjusted to 10mg Q8 per Endocrine.    REVIEW OF SYSTEMS:  Unable to obtain as patient has trach    ICU Vital Signs Last 24 Hrs  T(C): 36.6 (21 Sep 2021 12:00), Max: 36.7 (20 Sep 2021 16:00)  T(F): 97.9 (21 Sep 2021 12:00), Max: 98.1 (21 Sep 2021 00:00)  HR: 88 (21 Sep 2021 12:00) (79 - 97)  BP: --  BP(mean): --  ABP: 84/63 (21 Sep 2021 12:00) (77/60 - 102/74)  ABP(mean): 72 (21 Sep 2021 12:00) (67 - 88)  RR: 20 (21 Sep 2021 12:00) (10 - 46)  SpO2: 96% (21 Sep 2021 12:00) (94% - 100%)      Physical Exam:  Gen:  Awake, alert, NAD  CNS:  intact, nonfocal, responds to all commands  Neck: no JVD, +TC   RES : course breath sounds, no wheezing, moderate tan secretions able to cough up    CVS: +LVAD hum   Abd: Soft. Sybil drain with bilious fluid. Positive BS throughout. Mild RUQ abdominal tenderness by perc sybil.  Skin: No rash, erythema, cyanosis.  Vasc: Warm and well-perfused.  Ext:  no edema    ============================I/O===========================   I&O's Detail    20 Sep 2021 07:01  -  21 Sep 2021 07:00  --------------------------------------------------------  IN:    Enteral Tube Flush: 210 mL    IV PiggyBack: 100 mL    Miscellaneous Tube Feedin mL    sodium chloride 0.9%: 120 mL  Total IN: 1395 mL    OUT:    Drain (mL): 475 mL    Voided (mL): 900 mL  Total OUT: 1375 mL    Total NET: 20 mL      21 Sep 2021 07:01  -  21 Sep 2021 12:38  --------------------------------------------------------  IN:    Enteral Tube Flush: 40 mL    Miscellaneous Tube Feedin mL    sodium chloride 0.9%: 30 mL  Total IN: 340 mL    OUT:    Voided (mL): 350 mL  Total OUT: 350 mL    Total NET: -10 mL        ============================ LABS =========================                        9.6    10.30 )-----------( 346      ( 21 Sep 2021 00:32 )             30.7         131<L>  |  97  |  11  ----------------------------<  106<H>  4.6   |  27  |  0.40<L>    Ca    9.3      21 Sep 2021 00:24  Phos  3.2       Mg     1.8         TPro  7.7  /  Alb  2.9<L>  /  TBili  0.8  /  DBili  x   /  AST  26  /  ALT  28  /  AlkPhos  230<H>      LIVER FUNCTIONS - ( 21 Sep 2021 00:24 )  Alb: 2.9 g/dL / Pro: 7.7 g/dL / ALK PHOS: 230 U/L / ALT: 28 U/L / AST: 26 U/L / GGT: x             ABG - ( 21 Sep 2021 00:13 )  pH, Arterial: 7.45  pH, Blood: x     /  pCO2: 43    /  pO2: 145   / HCO3: 30    / Base Excess: 5.3   /  SaO2: 99.8                ======================Micro/Rad/Cardio=================  Culture: Reviewed   CXR: Reviewed  Echo:Reviewed  ======================================================  PAST MEDICAL & SURGICAL HISTORY:  CHF (congestive heart failure)    CAD (coronary artery disease)    Depression    Pleural effusion    History of 2019 novel coronavirus disease (COVID-19)  2020    Hemorrhoids    Bleeding hemorrhoids    Peripheral arterial disease    Claudication    BPH with urinary obstruction    ACC/AHA stage D systolic heart failure    Anticoagulation goal of INR 2.0 to 2.5    Falls    Clavicle fracture    CKD (chronic kidney disease), stage III    Iron deficiency anemia    H/O epistaxis    Vertigo    GI bleed    S/P TVR (tricuspid valve repair)    S/P ventricular assist device    S/P endoscopy      ====================ASSESSMENT ==============  Stage D Nonischemic Cardiomyopathy, Status Post HM2 on 2017    Cardiogenic shock  Hemodynamic instability   Acute hypoxemic respiratory failure s/p trach   GI bleed , Status Post Enteroscopy   Anemia, in setting of melena   Chronic Kidney Disease  Stress hyperglycemia   C.diff positive on    Hypovolemic shock  Septic shock  Leukocytosis  GB thickening/percholecystic s/p perc choley by IT   SMA stenosis  Serratia/citrobacter pneumonia   Stenotrophomonas pneumonia   Enterobacter pneumonia   Nasuea/vomiting  Deconditioning    Plan:  ====================== NEUROLOGY=====================  Nonfocal, continue to monitor neuro status per ICU protocol.   Continue ambulation as tolerated.   Tylenol PRN for analgesia   C/w mirtazapine and sertraline for depression  Deconditioned, PT/Ambulate as tolerated    acetaminophen   Tablet .. 650 milliGRAM(s) Oral every 6 hours PRN Mild Pain (1 - 3)  mirtazapine 7.5 milliGRAM(s) Oral daily  sertraline 100 milliGRAM(s) Oral daily    ==================== RESPIRATORY======================  Acute hypoxemic respiratory failure s/p #8 shiley trach on , continue TC as tolerated (TC since )  Continue close monitoring of respiratory rate and breathing pattern, following of ABG’s with A-line monitoring, continuous pulse oximetry monitoring.   Moderate secretions, continue suctioning prn, pulmonary toileting.       ====================CARDIOVASCULAR==================  Stage D Nonischemic Cardiomyopathy, Status Post HM2 on 2017; LVAD settings 9200, flow 5.3  TTE : reveals at least moderate MR, mild AI, severe global LV syst dysfxn, dec RV fxn   Propranolol for rate control of HR 2/2 Hyperthyroidism, titrate as tolerated per Endo  Continue invasive hemodynamic monitoring     propranolol 40 milliGRAM(s) Oral every 8 hours    ===================HEMATOLOGIC/ONC ===================  CTA A/P on  +L RP hematoma   Acute blood loss anemia, monitor H&H/Plts    Holding coumadin and ASA given recurrent GI bleeding    ===================== RENAL =========================  Continue monitoring urine output, I&OS, BUN/Cr   Euvolemic, even fluid balance, currently off diuretics.    Replete lytes PRN. Keep K> 4 and Mg >2     ==================== GASTROINTESTINAL===================  S/p cholecystostomy tube placed on  with IR , Increased feeds to 45cc/hr this AM.   CTA A/P : Evaluation of the mesenteric vessels is limited by streak artifact from LVAD. There appears to be severe stenosis of the proximal SMA; abdominal mesenteric doppler is recommended for further evaluation. 2.  No small bowel findings to suggest acute mesenteric ischemia. 3.  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  Mesenteric duplex on 8/15 borderline stenosis of proximal SMA, s/p failed SMA stent, L fem RP hematoma on 9/10, s/p 3U PRBC.   CTA A/P on  +L RP hematoma   Reglan for gut motility  Zofran PRN nausea/vomiting  Pt would likely benefit from a PEJ given persistent TF intolerance, N/V. Dr. Azar from general surgery discussed with Dr. Cadet about potential planning for PEJ placement, cholecystostomy removal, cholecystectomy, patient refusing everything at this time, will revisit tomorrow  Continue Protonix for stress ulcer prophylaxis.       metoclopramide Injectable 10 milliGRAM(s) IV Push every 8 hours  multivitamin 1 Tablet(s) Oral daily  GI prophylaxis, pantoprazole  Injectable 40 milliGRAM(s) IV Push every 12 hours  simethicone 80 milliGRAM(s) Chew every 8 hours PRN Gas  sodium chloride 0.9% lock flush 10 milliLiter(s) IV Push every 1 hour PRN Pre/post blood products, medications, blood draw, and to maintain line patency  sodium chloride 0.9%. 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 - Free T4 remains elevated   Per endocrine      - Thyroid US when trach is out      - Propanolol as above for optimal T4-->T3 conversion      - c/w Methimazole ---> changed to 10mg Q8 per Endo      - s/p hydrocortisone    dextrose 40% Gel 15 Gram(s) Oral once  dextrose 50% Injectable 25 Gram(s) IV Push once  dextrose 50% Injectable 12.5 Gram(s) IV Push once  glucagon  Injectable 1 milliGRAM(s) IntraMuscular once  insulin lispro (ADMELOG) corrective regimen sliding scale   SubCutaneous every 6 hours  methimazole 15 milliGRAM(s) Oral <User Schedule>    ========================INFECTIOUS DISEASE================  Afebrile ; leukocytosis   Continue trending WBC and monitoring fever curve  SCx : +Moderate Serratia marcescens, BCx on 9/15 BGTD   C. diff ppx coverage with Vancomycin PO   Meropenem empirically for possible PNA for 7 days    meropenem  IVPB 1000 milliGRAM(s) IV Intermittent every 8 hours  vancomycin    Solution 125 milliGRAM(s) Oral every 6 hours      Patient requires continuous monitoring with bedside rhythm monitoring, pulse ox monitoring, and intermittent blood gas analysis. Care plan discussed with ICU care team. Patient remained critical and at risk for life threatening decompensation.     By signing my name below, I, Aparna Saleh, attest that this documentation has been prepared under the direction and in the presence of CLAUDIA Goldstein   Electronically signed: Cesia Villegas, 21 @ 12:38    I, Cristóbal Cisneros, personally performed the services described in this documentation. all medical record entries made by the scribe were at my direction and in my presence. I have reviewed the chart and agree that the record reflects my personal performance and is accurate and complete  Electronically signed: CLAUDIA Goldstein

## 2021-09-21 NOTE — PROGRESS NOTE ADULT - SUBJECTIVE AND OBJECTIVE BOX
Behavioral Cardiology Progress Note     History of present illness: Mr. Quispe is a 66 year-old man with history of NICM s/p HM2 on 9/2017 as DT (due to severe PAD) with TV ring, prior COVID-19 infection 4/2020, recurrent syncope post LVAD s/p ILR, and chronic abdominal pain with prior negative workup who was admitted with symptomatic anemia with Hgb 4.5 in setting of INR 8.8 without hemodynamic instability. Testing showed active bleeding in the small bowel but subsequent enteroscopy 6/15 did not reveal any active bleeding. He acutely decompensated after procedure with fever/hypertension, low flow alarms, and pulmonary infiltrate with hypoxia requiring intubation from probable aspiration PNA.  Course notable for inability to wean ventilator with persistent secretions for which he underwent tracheostomy as well as acute c diff colitis.     Social history: , lives in his sister’s house in [place]. Has 3 sons (2 live in , 1 in [facility]). One of 3 siblings. Lives in his sister’s house in [place] (Cristina Rudolph [phone]), also in the home are niece (Celestina [facility] [phone]) and nephew (Miller Rudolph).  Another sister lives close by in [place] and all other siblings live in [place] which the family visit often.  Worked full time at [employer] in [place], stopped working due to heart disease. Education: high school graduate.     Substance use:   Tobacco: Former smoker, 8-10 cigarettes/day for 30 years.   Alcohol: Past use of 3-4 drinks of [place] 2x/week. None for past 4 years.   Drug: Denies any drug use.     Past psychiatric history: Denies     Mental status exam: Seen resting in bed with Trach collar. Communicating via mouthing words and using hand gestures. Awake, alert, oriented x3. Thought process goal oriented. No evidence of any psychosis, kenan, delusions. No SI/HI. Mood "so-so." Affect less constricted. Denies SI/HI. Insight and judgment adequate.

## 2021-09-21 NOTE — PROGRESS NOTE ADULT - SUBJECTIVE AND OBJECTIVE BOX
RICKY JOINT  MRN#: [id number]  Subjective:  Pulmonary progress  : recurrent Acute hypoxic respiratory Failure ,aspiration pneumonia, NICM  , chart reviewed and H/O obtained radiological and Laboratory study reviewed patient Examined     65M PMH ACC/AHA stage D HF due to NICM HM2 LVAD , TV annuloplasty ring 17 as destination therapy due to severe peripheral artery disease with significant stenosis  SIADH, Depression, CKD-3 with hyperkalemia, past E. coli UTIs, driveline drainage (21) and COVID-19 (back in 2020)  He was recently seen in clinic where he complained of abdominal pain and dark stools w constipation back in May. He presents to [facility] ER today weakness and fatigue, moderate and + Black stools for three days, on coumadin secondary to warfarin use in the setting of an LVAD. Patient has required transfusions for GIB in the past. Mostly recently back in 2021 pt had anemia with dark stools. No interventions was done at that time. However Last Endoscopy was done in 2020 (negative). Today labs show patient is anemic with H/H of 4.5/16.3,. INR is 8.84 MAP in the 90s, Temp 35.1. He denies any chest pain, shortness of breath, dizziness, abd pain, nausea or vomiting. found to have  rectal bleeding underwent endoscopy ,old blood in the proximal ileum ,  develop sepsis with LL opacity given Antibiotics , Extubated , reintubated , Bronchoscopy on Zosyn for LL pneumonia  and Amiodarone S/P TV Annuloplasty , patient remain intubated on full ventilatory support .S/P multiple units of blood transfusion , remain on full ventilatory support on Precedex and propofol , new central IJ line , diarrhea C diff. +ve on po vancomycin and IV Flagyl,  mildly distended belly , fever start on cefepime 2gm q 8 hrs S/P tracheostomy .  new RT Subclavian central line continue on contact  isolation ,still diarrhea on C-diff antibiotics ENT follow up appreciated , trial of C-PAP as tolerated , , copious secretion from trach. site chest x ray left lower lobe pneumonia , tolerating trch. collar 50% FI02 still excessive secretion need pulmonary toilet , off Ancef antibiotic , no more diarrhea back on full support mechanical ventilator , chest x ray show improvement in LLL air space disease, more awake and responsive on tube feeding no more diarrhea , S/P  Ancef for bacteremia no fever ,ID follow up noted ,  no nausea or vomiting or diarrhea still very weak and tired , event note pulled NG tube now replaced , back on tube feeding ,still on po vancomycin , getting PT and OT at bed side , no plan for decannulation for now. , no more diarrhea receiving PT and OPT at bed side , minimal secretion from tracheostomy site , no SOB getting stronger , improve muscle tone patient transfer to monitor bed still on contact isolation for C-Difficel colitis on 50% FI02, NG tube clogged , and change to resume tube feeding still loose stool . H/H drop significantly require blood transfusion , most likely GI bleeding , IV heparin D/C , vomiting 200 cc of creamy color tube feeding on hold no sob, still melena monitor in the CTU possible capsule endoscopy , H/H is stable ., patient develop TR sided  pneumothorax require chest tube placement , RT IJ central line  placed , develop fever shaking chills , blood culture positive for serratia marcescens , start on cefepime .the patient  become hypoxic and hypotensive placed on full ventilatory support and Vasopressin , levophed and Dobutamine ,S/P blood transfusion on meropenem and vancomycin ,   , on and  off pressors , occasional agitation on Precedex .S/P IR cholecystostomy tube drainage placement in the RT upper Quadrant , resume anticoagulation chest x ray noted C-PAP trail lasted only for 2 hrs , new RT SC line and D/C RT IJ line , RT pig tail cathter has been removed , tolerating C-PAP trial placed on trach. collar 50% FI02 GI consultant noted on NG tube feeding as tolerated , develop AF RVR S/P  bolus Amiodarone  back to regular sinus Rhythm , Flat Affect depressed , back on tube feeding Vital AF at 60 cc/ hr .still intermittent abdominal pain , no fever saturation is accepted  back on full ventilatory support , tired and sleepy on round    .start  back on  tube feeding . tolerating it very well , no nausea or vomiting , good 02 saturation on trac-collar on methimazole for hyperthyroidism , no new C/O no plan for decannulation yet , electrolyte blood test is still pending .on respiratory isolation sputum culture is negative , increase WBC  improved on cefepime , sputum positive for enterococcus , condition is the same ,still C/O Abdominal pain , white count is improving , no chest pain or SOB ,  .placed on Reglan 10 mg TID for gastric motility , depressed , withdrawn. on full NG tube diet with Vital , secretion are much improved , went for mesenteric angiogram , unable to stent SMA S/P 3 units of blood transfusion on trach. collar 40% Fi02, anxious to eat discuss possible decannulation,  H/H  are stable vascular  note appreciated no mediate plan for repeat mesenteric angiogram remain on trach.-collar at 40% FI02, RT IJ in place reassessed for stent in the SMA by vascular, no plan for now depresses and withdrawn on meropenem and po vancomycin for elevated white count pending culture no plan for further vascular study or decannulation  , no events overnight , white count is trending down , dark stool stable H/H ,surgical opinion for possible Lap cholecystectomy.      (2021 16:57)    PAST MEDICAL & SURGICAL HISTORY:  CHF (congestive heart failure)    CAD (coronary artery disease)  Depression    Pleural effusion    History of 2019 novel coronavirus disease (COVID-19)  2020    Hemorrhoids    Bleeding hemorrhoids    Peripheral arterial disease    Claudication    BPH with urinary obstruction    ACC/AHA stage D systolic heart failure  Anticoagulation goal of INR 2.0 to 2.5    Falls    Clavicle fracture    CKD (chronic kidney disease), stage III    Iron deficiency anemia    H/O epistaxis    Vertigo    GI bleed    S/P TVR (tricuspid valve repair)    S/P ventricular assist device    S/P endoscopy    OBJECTIVE:  ICU Vital Signs Last 24 Hrs  T(C): 38.2 (2021 10:00), Max: 38.5 (2021 12:00)  T(F): 100.8 (2021 10:00), Max: 101.3 (2021 12:00)  HR: 65 (2021 10:00) (61 - 69)  BP: --  BP(mean): --  ABP: 105/67 (2021 10:00) (90/54 - 113/64)  ABP(mean): 77 (2021 10:00) (63 - 77)  RR: 20 (2021 10:00) (19 - 35)  SpO2: 99% (2021 10:00) (96% - 100%)      - @ 07:01  -  06- @ 07:00  --------------------------------------------------------  IN: 2693.9 mL / OUT: 1415 mL / NET: 1278.9 mL     @ 07:01   @ 10:49  --------------------------------------------------------  IN: 420.8 mL / OUT: 115 mL / NET: 305.8 mL  PHYSICAL EXAM:Daily   Elderly male S/P tracheostomy on full ventilatory support  50% FI02 ,  Daily Weight in k.4 (2021 00:00)  HEENT:     + NCAT  + EOMI  - Conjuctival edema   - Icterus   - Thrush   - ETT  + NGT/OGT  Neck:         + FROM  RT IJ  line JVD  - Nodes - Masses + Mid-line trachea + Tracheostomy  Chest:            normal A-P diameter    Lungs:          + CTA   + Rhonchi    - Rales    - Wheezing + Decreased  LT BS   - Dullness R L  Cardiac:       + S1 + S2    + RRR   - Irregular   - S3  - S4    - Murmurs   - Rub   - Hamman’s sign   Abdomen:    + BS  + Soft + Non-tender - Distended - Organomegaly - PEG .cholecystostomy tube in place  Extremities:   - Cyanosis U/L   - Clubbing  U/L  + LE/UE Edema   + Capillary refill    + Pulses   Neuro:        - Awake   -  Alert   - Confused   - Lethargic   + Sedated  + Generalized Weakness  Skin:        - Rashes    - Erythema   + Normal incisions   + IV sites intact          HOSPITAL MEDICATIONS: All medications reviewed and analyzed  MEDICATIONS  (STANDING):  amiodarone    Tablet 200 milliGRAM(s) Oral daily  chlorhexidine 0.12% Liquid 15 milliLiter(s) Oral Mucosa every 12 hours  chlorhexidine 2% Cloths 1 Application(s) Topical <User Schedule>  dexmedetomidine Infusion 0.5 MICROgram(s)/kG/Hr (9.81 mL/Hr) IV Continuous <Continuous>  dextrose 50% Injectable 50 milliLiter(s) IV Push every 15 minutes  heparin  Infusion 400 Unit(s)/Hr (12.5 mL/Hr) IV Continuous <Continuous>  Hydromorphone  Injectable 0.5 milliGRAM(s) IV Push once  insulin lispro (ADMELOG) corrective regimen sliding scale   SubCutaneous every 6 hours  pantoprazole  Injectable 40 milliGRAM(s) IV Push every 12 hours  piperacillin/tazobactam IVPB.. 3.375 Gram(s) IV Intermittent every 8 hours  propofol Infusion 20 MICROgram(s)/kG/Min (9.42 mL/Hr) IV Continuous <Continuous>  sodium chloride 0.9% lock flush 3 milliLiter(s) IV Push every 8 hours  sodium chloride 0.9%. 1000 milliLiter(s) (10 mL/Hr) IV Continuous <Continuous>    MEDICATIONS  (PRN):  acetaminophen    Suspension .. 650 milliGRAM(s) Oral every 6 hours PRN Temp greater or equal to 38C (100.4F)    LABS: All Lab data reviewed and analyzed                                    9.6    10.30 )-----------( 346      ( 21 Sep 2021 00:32 )             30.7        131<L>  |  97  |  11  ----------------------------<  106<H>  4.6   |  27  |  0.40<L>    Ca    9.3      21 Sep 2021 00:24  Phos  3.2     -  Mg     1.8         TPro  7.7  /  Alb  2.9<L>  /  TBili  0.8  /  DBili  x   /  AST  26  /  ALT  28  /  AlkPhos  230<H>      Ca    9.1      20 Sep 2021 00:28  Phos  2.9       Mg     2.0         TPro  7.6  /  Alb  2.7<L>  /  TBili  0.8  /  DBili  x   /  AST  26  /  ALT  24  /  AlkPhos  234<H>        Ca    9.5      19 Sep 2021 00:25  Phos  2.9     -  Mg     1.7         TPro  7.3  /  Alb  2.8<L>  /  TBili  1.1  /  DBili  x   /  AST  19  /  ALT  22  /  AlkPhos  252<H>      Ca    9.5      18 Sep 2021 01:27  Phos  3.2     -18  Mg     1.8         TPro  7.0  /  Alb  2.7<L>  /  TBili  1.2  /  DBili  x   /  AST  16  /  ALT  23  /  AlkPhos  239<H>      Ca    9.4      17 Sep 2021 00:51  Phos  3.5     -17  Mg     1.8         TPro  7.3  /  Alb  2.5<L>  /  TBili  2.3<H>  /  DBili  x   /  AST  16  /  ALT  27  /  AlkPhos  234<H>        Ca    9.4      16 Sep 2021 01:02  Phos  2.8     -16  Mg     1.7         TPro  7.0  /  Alb  2.7<L>  /  TBili  2.0<H>  /  DBili  x   /  AST  16  /  ALT  35  /  AlkPhos  242<H>      Ca    9.6      15 Sep 2021 00:36  Phos  3.5     -15  Mg     1.8     09-15    TPro  7.0  /  Alb  3.0<L>  /  TBili  2.4<H>  /  DBili  x   /  AST  23  /  ALT  52<H>  /  AlkPhos  258<H>  09-15      Ca    8.5      14 Sep 2021 00:33  Phos  2.6     -14  Mg     1.6         TPro  6.0  /  Alb  2.8<L>  /  TBili  1.2  /  DBili  x   /  AST  36  /  ALT  60<H>  /  AlkPhos  215<H>        Ca    9.2      13 Sep 2021 01:03  Phos  3.0     -13  Mg     1.7         TPro  6.5  /  Alb  2.8<L>  /  TBili  0.6  /  DBili  x   /  AST  33  /  ALT  75<H>  /  AlkPhos  201<H>                                                                                                                                            PTT - ( 2021 04:52 )  PTT:45.2 sec LIVER FUNCTIONS - ( 2021 00:42 )  Alb: 3.4 g/dL / Pro: 6.7 g/dL / ALK PHOS: 213 U/L / ALT: 15 U/L / AST: 24 U/L / GGT: x           RADIOLOGY: - Reviewed and analyzed RT Pig tail cathter  , LVAD HM2, CT scan of abdomen reviewed result noted

## 2021-09-21 NOTE — PROGRESS NOTE ADULT - SUBJECTIVE AND OBJECTIVE BOX
Admitted for:    Following for:    Subjective:       MEDICATIONS  (STANDING):  chlorhexidine 0.12% Liquid 15 milliLiter(s) Oral Mucosa two times a day  chlorhexidine 2% Cloths 1 Application(s) Topical <User Schedule>  dextrose 40% Gel 15 Gram(s) Oral once  dextrose 50% Injectable 25 Gram(s) IV Push once  dextrose 50% Injectable 12.5 Gram(s) IV Push once  glucagon  Injectable 1 milliGRAM(s) IntraMuscular once  insulin lispro (ADMELOG) corrective regimen sliding scale   SubCutaneous every 6 hours  lidocaine   4% Patch 1 Patch Transdermal daily  meropenem  IVPB 1000 milliGRAM(s) IV Intermittent every 8 hours  methimazole 15 milliGRAM(s) Oral <User Schedule>  metoclopramide Injectable 10 milliGRAM(s) IV Push every 8 hours  mirtazapine 7.5 milliGRAM(s) Oral daily  multivitamin 1 Tablet(s) Oral daily  pantoprazole  Injectable 40 milliGRAM(s) IV Push every 12 hours  propranolol 40 milliGRAM(s) Oral every 8 hours  sertraline 100 milliGRAM(s) Oral daily  sodium chloride 0.9%. 1000 milliLiter(s) (10 mL/Hr) IV Continuous <Continuous>  thiamine 100 milliGRAM(s) Oral daily  vancomycin    Solution 125 milliGRAM(s) Oral every 6 hours    MEDICATIONS  (PRN):  acetaminophen   Tablet .. 650 milliGRAM(s) Oral every 6 hours PRN Mild Pain (1 - 3)  ondansetron Injectable 4 milliGRAM(s) IV Push every 6 hours PRN Nausea and/or Vomiting  simethicone 80 milliGRAM(s) Chew every 8 hours PRN Gas  sodium chloride 0.9% lock flush 10 milliLiter(s) IV Push every 1 hour PRN Pre/post blood products, medications, blood draw, and to maintain line patency      Allergies    Amiodarone Hydrochloride (Other (Severe))    Intolerances        Review of Systems:  Constitutional: No fever  Eyes: No blurry vision  Neuro: No tremors  HEENT: No pain  Cardiovascular: No chest pain, palpitations  Respiratory: No SOB, no cough  GI: No nausea, vomiting, abdominal pain  : No dysuria  Skin: no rash  Psych: no depression  Endocrine: no polyuria, polydipsia  Hem/lymph: no swelling  Osteoporosis: no fractures    PHYSICAL EXAM:  VITALS: T(C): 36.6 (09-21-21 @ 12:00)  T(F): 97.9 (09-21-21 @ 12:00), Max: 98.1 (09-21-21 @ 00:00)  HR: 88 (09-21-21 @ 12:00) (79 - 97)  BP: --  RR:  (10 - 46)  SpO2:  (94% - 100%)  Wt(kg): --  GENERAL: NAD  EYES: No proptosis, no lid lag, anicteric  HEENT:  Atraumatic  THYROID: Normal size, no palpable nodules  RESPIRATORY: Clear to auscultation bilaterally  CARDIOVASCULAR: Regular rate and rhythm; No murmurs; no peripheral edema  GI: Soft, nontender, non distended, normal bowel sounds  SKIN: Dry  PSYCH: Alert and oriented x 3, flat affect      A1C with Estimated Average Glucose Result: 5.4 % (08-15-21 @ 13:52)  A1C with Estimated Average Glucose Result: 5.6 % (06-15-21 @ 02:42)      POCT Blood Glucose.: 118 mg/dL (09-21-21 @ 11:24)  POCT Blood Glucose.: 123 mg/dL (09-21-21 @ 06:20)  POCT Blood Glucose.: 117 mg/dL (09-20-21 @ 22:57)  POCT Blood Glucose.: 113 mg/dL (09-20-21 @ 17:32)  POCT Blood Glucose.: 109 mg/dL (09-20-21 @ 11:53)  POCT Blood Glucose.: 121 mg/dL (09-20-21 @ 06:34)  POCT Blood Glucose.: 34 mg/dL (09-20-21 @ 06:32)  POCT Blood Glucose.: 122 mg/dL (09-19-21 @ 23:03)  POCT Blood Glucose.: 126 mg/dL (09-19-21 @ 17:18)  POCT Blood Glucose.: 127 mg/dL (09-19-21 @ 11:16)  POCT Blood Glucose.: 128 mg/dL (09-19-21 @ 05:15)  POCT Blood Glucose.: 122 mg/dL (09-18-21 @ 23:56)  POCT Blood Glucose.: 124 mg/dL (09-18-21 @ 17:15)      09-21    131<L>  |  97  |  11  ----------------------------<  106<H>  4.6   |  27  |  0.40<L>    EGFR if : 144  EGFR if non : 125    Ca    9.3      09-21  Mg     1.8     09-21  Phos  3.2     09-21    TPro  7.7  /  Alb  2.9<L>  /  TBili  0.8  /  DBili  x   /  AST  26  /  ALT  28  /  AlkPhos  230<H>  09-21      Thyroid Function Tests:  09-21 @ 12:22 TSH -- FreeT4 4.9 T3 -- Anti TPO -- Anti Thyroglobulin Ab -- TSI --  09-20 @ 12:00 TSH <0.01 FreeT4 4.0 T3 133 Anti TPO -- Anti Thyroglobulin Ab -- TSI --                         Admitted for: Anemia    Following for: Hyperthyroidism    Subjective: Patient still c/o abdominal pain      MEDICATIONS  (STANDING):  chlorhexidine 0.12% Liquid 15 milliLiter(s) Oral Mucosa two times a day  chlorhexidine 2% Cloths 1 Application(s) Topical <User Schedule>  dextrose 40% Gel 15 Gram(s) Oral once  dextrose 50% Injectable 25 Gram(s) IV Push once  dextrose 50% Injectable 12.5 Gram(s) IV Push once  glucagon  Injectable 1 milliGRAM(s) IntraMuscular once  insulin lispro (ADMELOG) corrective regimen sliding scale   SubCutaneous every 6 hours  lidocaine   4% Patch 1 Patch Transdermal daily  meropenem  IVPB 1000 milliGRAM(s) IV Intermittent every 8 hours  methimazole 15 milliGRAM(s) Oral <User Schedule>  metoclopramide Injectable 10 milliGRAM(s) IV Push every 8 hours  mirtazapine 7.5 milliGRAM(s) Oral daily  multivitamin 1 Tablet(s) Oral daily  pantoprazole  Injectable 40 milliGRAM(s) IV Push every 12 hours  propranolol 40 milliGRAM(s) Oral every 8 hours  sertraline 100 milliGRAM(s) Oral daily  sodium chloride 0.9%. 1000 milliLiter(s) (10 mL/Hr) IV Continuous <Continuous>  thiamine 100 milliGRAM(s) Oral daily  vancomycin    Solution 125 milliGRAM(s) Oral every 6 hours    MEDICATIONS  (PRN):  acetaminophen   Tablet .. 650 milliGRAM(s) Oral every 6 hours PRN Mild Pain (1 - 3)  ondansetron Injectable 4 milliGRAM(s) IV Push every 6 hours PRN Nausea and/or Vomiting  simethicone 80 milliGRAM(s) Chew every 8 hours PRN Gas  sodium chloride 0.9% lock flush 10 milliLiter(s) IV Push every 1 hour PRN Pre/post blood products, medications, blood draw, and to maintain line patency      Allergies    Amiodarone Hydrochloride (Other (Severe))    Intolerances      PHYSICAL EXAM:  VITALS: T(C): 36.6 (09-21-21 @ 12:00)  T(F): 97.9 (09-21-21 @ 12:00), Max: 98.1 (09-21-21 @ 00:00)  HR: 88 (09-21-21 @ 12:00) (79 - 97)  BP: --  RR:  (10 - 46)  SpO2:  (94% - 100%)  Wt(kg): --  GENERAL: NAD  EYES: No proptosis, no lid lag, anicteric  HEENT:  Atraumatic  THYROID: Normal size, no palpable nodules  RESPIRATORY: Clear to auscultation bilaterally  CARDIOVASCULAR: Regular rate and rhythm; No murmurs; no peripheral edema  GI: Soft, nontender, non distended, normal bowel sounds  SKIN: Dry  PSYCH: Alert and oriented x 3, flat affect      A1C with Estimated Average Glucose Result: 5.4 % (08-15-21 @ 13:52)  A1C with Estimated Average Glucose Result: 5.6 % (06-15-21 @ 02:42)      POCT Blood Glucose.: 118 mg/dL (09-21-21 @ 11:24)  POCT Blood Glucose.: 123 mg/dL (09-21-21 @ 06:20)  POCT Blood Glucose.: 117 mg/dL (09-20-21 @ 22:57)  POCT Blood Glucose.: 113 mg/dL (09-20-21 @ 17:32)  POCT Blood Glucose.: 109 mg/dL (09-20-21 @ 11:53)  POCT Blood Glucose.: 121 mg/dL (09-20-21 @ 06:34)  POCT Blood Glucose.: 34 mg/dL (09-20-21 @ 06:32)  POCT Blood Glucose.: 122 mg/dL (09-19-21 @ 23:03)  POCT Blood Glucose.: 126 mg/dL (09-19-21 @ 17:18)  POCT Blood Glucose.: 127 mg/dL (09-19-21 @ 11:16)  POCT Blood Glucose.: 128 mg/dL (09-19-21 @ 05:15)  POCT Blood Glucose.: 122 mg/dL (09-18-21 @ 23:56)  POCT Blood Glucose.: 124 mg/dL (09-18-21 @ 17:15)      09-21    131<L>  |  97  |  11  ----------------------------<  106<H>  4.6   |  27  |  0.40<L>    EGFR if : 144  EGFR if non : 125    Ca    9.3      09-21  Mg     1.8     09-21  Phos  3.2     09-21    TPro  7.7  /  Alb  2.9<L>  /  TBili  0.8  /  DBili  x   /  AST  26  /  ALT  28  /  AlkPhos  230<H>  09-21      Thyroid Function Tests:  09-21 @ 12:22 TSH -- FreeT4 4.9 T3 -- Anti TPO -- Anti Thyroglobulin Ab -- TSI --  09-20 @ 12:00 TSH <0.01 FreeT4 4.0 T3 133 Anti TPO -- Anti Thyroglobulin Ab -- TSI --                         Admitted for: Anemia    Following for: Hyperthyroidism    Subjective: Patient still c/o abdominal pain      MEDICATIONS  (STANDING):  chlorhexidine 0.12% Liquid 15 milliLiter(s) Oral Mucosa two times a day  chlorhexidine 2% Cloths 1 Application(s) Topical <User Schedule>  dextrose 40% Gel 15 Gram(s) Oral once  dextrose 50% Injectable 25 Gram(s) IV Push once  dextrose 50% Injectable 12.5 Gram(s) IV Push once  glucagon  Injectable 1 milliGRAM(s) IntraMuscular once  insulin lispro (ADMELOG) corrective regimen sliding scale   SubCutaneous every 6 hours  lidocaine   4% Patch 1 Patch Transdermal daily  meropenem  IVPB 1000 milliGRAM(s) IV Intermittent every 8 hours  methimazole 15 milliGRAM(s) Oral <User Schedule>  metoclopramide Injectable 10 milliGRAM(s) IV Push every 8 hours  mirtazapine 7.5 milliGRAM(s) Oral daily  multivitamin 1 Tablet(s) Oral daily  pantoprazole  Injectable 40 milliGRAM(s) IV Push every 12 hours  propranolol 40 milliGRAM(s) Oral every 8 hours  sertraline 100 milliGRAM(s) Oral daily  sodium chloride 0.9%. 1000 milliLiter(s) (10 mL/Hr) IV Continuous <Continuous>  thiamine 100 milliGRAM(s) Oral daily  vancomycin    Solution 125 milliGRAM(s) Oral every 6 hours    MEDICATIONS  (PRN):  acetaminophen   Tablet .. 650 milliGRAM(s) Oral every 6 hours PRN Mild Pain (1 - 3)  ondansetron Injectable 4 milliGRAM(s) IV Push every 6 hours PRN Nausea and/or Vomiting  simethicone 80 milliGRAM(s) Chew every 8 hours PRN Gas  sodium chloride 0.9% lock flush 10 milliLiter(s) IV Push every 1 hour PRN Pre/post blood products, medications, blood draw, and to maintain line patency      Allergies    Amiodarone Hydrochloride (Other (Severe))    Intolerances      PHYSICAL EXAM:  VITALS: T(C): 36.6 (09-21-21 @ 12:00)  T(F): 97.9 (09-21-21 @ 12:00), Max: 98.1 (09-21-21 @ 00:00)  HR: 88 (09-21-21 @ 12:00) (79 - 97)  BP: --  RR:  (10 - 46)  SpO2:  (94% - 100%)  Wt(kg): --  GENERAL: NAD, NGT in place  THYROID: trach in place, unable to palpate  RESPIRATORY: Clear to auscultation bilaterally  CARDIOVASCULAR: Regular rate and rhythm; No murmurs; no peripheral edema  GI: tenderness to palpation of abdomen  PSYCH: Alert and oriented x 3, flat affect      A1C with Estimated Average Glucose Result: 5.4 % (08-15-21 @ 13:52)  A1C with Estimated Average Glucose Result: 5.6 % (06-15-21 @ 02:42)      POCT Blood Glucose.: 118 mg/dL (09-21-21 @ 11:24)  POCT Blood Glucose.: 123 mg/dL (09-21-21 @ 06:20)  POCT Blood Glucose.: 117 mg/dL (09-20-21 @ 22:57)  POCT Blood Glucose.: 113 mg/dL (09-20-21 @ 17:32)  POCT Blood Glucose.: 109 mg/dL (09-20-21 @ 11:53)  POCT Blood Glucose.: 121 mg/dL (09-20-21 @ 06:34)  POCT Blood Glucose.: 34 mg/dL (09-20-21 @ 06:32)  POCT Blood Glucose.: 122 mg/dL (09-19-21 @ 23:03)  POCT Blood Glucose.: 126 mg/dL (09-19-21 @ 17:18)  POCT Blood Glucose.: 127 mg/dL (09-19-21 @ 11:16)  POCT Blood Glucose.: 128 mg/dL (09-19-21 @ 05:15)  POCT Blood Glucose.: 122 mg/dL (09-18-21 @ 23:56)  POCT Blood Glucose.: 124 mg/dL (09-18-21 @ 17:15)      09-21    131<L>  |  97  |  11  ----------------------------<  106<H>  4.6   |  27  |  0.40<L>    EGFR if : 144  EGFR if non : 125    Ca    9.3      09-21  Mg     1.8     09-21  Phos  3.2     09-21    TPro  7.7  /  Alb  2.9<L>  /  TBili  0.8  /  DBili  x   /  AST  26  /  ALT  28  /  AlkPhos  230<H>  09-21      Thyroid Function Tests:  09-21 @ 12:22 TSH -- FreeT4 4.9 T3 -- Anti TPO -- Anti Thyroglobulin Ab -- TSI --  09-20 @ 12:00 TSH <0.01 FreeT4 4.0 T3 133 Anti TPO -- Anti Thyroglobulin Ab -- TSI --                         Admitted for: Anemia    Following for: Hyperthyroidism    Subjective: Patient still c/o abdominal pain      MEDICATIONS  (STANDING):  chlorhexidine 0.12% Liquid 15 milliLiter(s) Oral Mucosa two times a day  chlorhexidine 2% Cloths 1 Application(s) Topical <User Schedule>  dextrose 40% Gel 15 Gram(s) Oral once  dextrose 50% Injectable 25 Gram(s) IV Push once  dextrose 50% Injectable 12.5 Gram(s) IV Push once  glucagon  Injectable 1 milliGRAM(s) IntraMuscular once  insulin lispro (ADMELOG) corrective regimen sliding scale   SubCutaneous every 6 hours  lidocaine   4% Patch 1 Patch Transdermal daily  meropenem  IVPB 1000 milliGRAM(s) IV Intermittent every 8 hours  methimazole 15 milliGRAM(s) Oral <User Schedule>  metoclopramide Injectable 10 milliGRAM(s) IV Push every 8 hours  mirtazapine 7.5 milliGRAM(s) Oral daily  multivitamin 1 Tablet(s) Oral daily  pantoprazole  Injectable 40 milliGRAM(s) IV Push every 12 hours  propranolol 40 milliGRAM(s) Oral every 8 hours  sertraline 100 milliGRAM(s) Oral daily  sodium chloride 0.9%. 1000 milliLiter(s) (10 mL/Hr) IV Continuous <Continuous>  thiamine 100 milliGRAM(s) Oral daily  vancomycin    Solution 125 milliGRAM(s) Oral every 6 hours    MEDICATIONS  (PRN):  acetaminophen   Tablet .. 650 milliGRAM(s) Oral every 6 hours PRN Mild Pain (1 - 3)  ondansetron Injectable 4 milliGRAM(s) IV Push every 6 hours PRN Nausea and/or Vomiting  simethicone 80 milliGRAM(s) Chew every 8 hours PRN Gas  sodium chloride 0.9% lock flush 10 milliLiter(s) IV Push every 1 hour PRN Pre/post blood products, medications, blood draw, and to maintain line patency      Allergies    Amiodarone Hydrochloride (Other (Severe))    Intolerances      PHYSICAL EXAM:  VITALS: T(C): 36.6 (09-21-21 @ 12:00)  T(F): 97.9 (09-21-21 @ 12:00), Max: 98.1 (09-21-21 @ 00:00)  HR: 88 (09-21-21 @ 12:00) (79 - 97)  BP: --  RR:  (10 - 46)  SpO2:  (94% - 100%)  Wt(kg): --  GENERAL: NAD, NGT in place  THYROID: trach in place, unable to palpate  RESPIRATORY: Clear to auscultation bilaterally  CARDIOVASCULAR: Regular rate and rhythm; No murmurs; no peripheral edema  GI: tenderness to palpation of abdomen  PSYCH: Alert and oriented x 3, flat affect      A1C with Estimated Average Glucose Result: 5.4 % (08-15-21 @ 13:52)  A1C with Estimated Average Glucose Result: 5.6 % (06-15-21 @ 02:42)      POCT Blood Glucose.: 118 mg/dL (09-21-21 @ 11:24)  POCT Blood Glucose.: 123 mg/dL (09-21-21 @ 06:20)  POCT Blood Glucose.: 117 mg/dL (09-20-21 @ 22:57)  POCT Blood Glucose.: 113 mg/dL (09-20-21 @ 17:32)  POCT Blood Glucose.: 109 mg/dL (09-20-21 @ 11:53)  POCT Blood Glucose.: 121 mg/dL (09-20-21 @ 06:34)  POCT Blood Glucose.: 34 mg/dL (09-20-21 @ 06:32)  POCT Blood Glucose.: 122 mg/dL (09-19-21 @ 23:03)  POCT Blood Glucose.: 126 mg/dL (09-19-21 @ 17:18)  POCT Blood Glucose.: 127 mg/dL (09-19-21 @ 11:16)  POCT Blood Glucose.: 128 mg/dL (09-19-21 @ 05:15)  POCT Blood Glucose.: 122 mg/dL (09-18-21 @ 23:56)  POCT Blood Glucose.: 124 mg/dL (09-18-21 @ 17:15)      09-21    131<L>  |  97  |  11  ----------------------------<  106<H>  4.6   |  27  |  0.40<L>    EGFR if : 144  EGFR if non : 125    Ca    9.3      09-21  Mg     1.8     09-21  Phos  3.2     09-21    TPro  7.7  /  Alb  2.9<L>  /  TBili  0.8  /  DBili  x   /  AST  26  /  ALT  28  /  AlkPhos  230<H>  09-21      Thyroid Function Tests:  09-21 @ 12:22 FreeT4 4.9   09-20 @ 12:00 TSH <0.01 FreeT4 4.0 T3 133

## 2021-09-21 NOTE — PROGRESS NOTE ADULT - ASSESSMENT
Assessment and Recommendation:   · Assessment	  Assessment and recommendation :  Recurrent Acute hypoxic respiratory Failure S/P tracheostomy on full ventilatory support  50% FI02,   Acute blood loss anemia S/P multiple  blood transfusion   going for mesenteric angiogram  S/P septic shock off vasopressin , levophed and off  Dobutamine   S/P cholecystostomy tube placement by IR    AF RVR back to regular sinus Rhythm   Non ischemic cardiomyopathy continue ACE inhibitor and B-Blockers   mesenteric ischemia failure to stent SMA , no plan for repeated trial   S/P 3 unit of blood transfusion   S/P Septic shock and cardiogenic shock   Stage D systolic heart failure S/P LVAD HM2   MH2 LVAD  with  TV Annuloplasty  Severe peripheral vascular disease  surgical reassessment for possible cholecystectomy   severe hyperglycemia on insulin coverage    on meropenem and po vancomycin   Reglan 10 mg for Gastric Motility   hyperthyroidism on Methimazole 10mg TID   critical care polyneuropathy   Anemia of Acute blood Loss   severe protein caloric malnutrition   S/P blood and FFP transfusion   Chronic kidney disease stage III  Depressed and withdrawn   on   NGT feeding on Vital  advanced to 55 cc/ hr   ? decannulation   GI prophylaxis with PPI   Discussed with TCV team and vascular

## 2021-09-21 NOTE — PROGRESS NOTE ADULT - PROBLEM SELECTOR PLAN 2
- Continue current speed of 9200 RPM. Speed increased this admission due to signs of inadequately unloaded left ventricle  - MAP is at goal at this time  - Holding coumadin and aspirin indefinitely given recurrent GI bleeding.   - LDH level remains elevated, currently 366. likely related to RP bleed. Remains off AC as stated above. Continue to monitor levels daily

## 2021-09-21 NOTE — PROGRESS NOTE ADULT - ASSESSMENT
Met with patient in morning to discuss going outside today given beautiful weather conditions. Receptive to idea. Returned later in morning with team members but patient reported he was again experiencing discomfort/pain in his lower abdomen and did not want to go outside but was agreeable to walking in hallway with PT. Ambulated with walker at very good pace. Reports his mood is "so-so." Denied symptoms of hopelessness, andhedonia. Denied symptoms of anxiety. Sleep a little better last night. Takes Remeron at bedtime. Denies SI/HI. Receptive to support, validation.     Dx: Adjustment disorder with anxiety and depressed mood. F43.23 Systolic heart failure I50.2  LVAD Z95.811    Recommendations:   Whenever possible, provide change of environment (going outside, sitting by window) to foster positive mood   To promote sleep, keep lights and noise level to minimum during night  Engage with patient as often as possible throughout day to encourage communication  Behavioral Cardiology will follow      20 minutes spent on total patient encounter    Jessica Hennessy, PhD  [phone]

## 2021-09-21 NOTE — PROGRESS NOTE ADULT - ASSESSMENT
65 year old male with history of Stage D HF due to NICM on LVAD, TV annuloplasty ring 9/12/17 as destination therapy due to severe peripheral artery disease with significant stenosis  SIADH, Depression, CKD-3 with hyperkalemia, admitted 6/14/21 with symptomatic anemia with Hgb 4.5 in setting of INR 8.8, thereafter with complicated hospital course including VAP, serratia bacteremia with acalculous cholecystitis s/p percutaneous tube, abdominal pain s/p attempted SMA stent on 9/10/21, and now found to have L RP bleed.     Endocrine consulted for management of hyperthyroidism in the setting of recent amiodarone use and 2 IV contrast CTs with (7/28 and 8/13) and steroid induced hyperglycemia on tube feeds    #Hyperthyroidism likely 2/2 Amiodarone and contrast induced thyroiditis    Most likely Type 1 AIT given abrupt presentation after Amiodarone use and positive TPO Ab. Prior to initiation of Amiodarone on 6/14, TSH was normal (1.98). Nearly two months later, on 8/7 - TSH was < 0.01  s/p steroid taper    Recent labs have shown increase in FT4 and TT3 since decrease in MMI dose to 15 mg from 20mg, and recent contrast loads  FT4: 4.0 -> 3.4 -> 3.5 (changed to MMI 15 mg from 20 mg on 9/15) -> 3.1 (9/18) -> 4.0 (9/20)-> 4.9 (9/21)  TT3: Incresed to 133 from 90, and 92 before then    Recs  -Ideally, contrast imaging should be deferred until the patient is biochemically euthyroid as further iodine load can exacerbate thyrotoxicosis. However, if contrast imaging is required, risks of deferring the imaging need to be weighed against the risks of further contrast load in this setting.   -CT chest reviewed with radiology - no large or obvious nodules noted in thyroid, however hard to exclude smaller nodules due to presence of trach and artifact  -MMI _   -Check Free T4 and total T3 on 9/24  -Continue Propranolol 40 mg q 8 hrs. Goal HR 60-80. HR has been in the 90s recently. Will hold off on further Propranolol adjustments at this time    #Steroid induced hyperglycemia, s/p completion of steroids  HbA1c 5.4%. FS at goal 100-180  -Continue only low dose correctional scale q 6 hrs           Sia Aceves MD  Endocrine Fellow  Pager: [phone]/TIMBO [phone]  Consults 9am-5pm: [phone]  After 5pm and weekends: [phone]     65 year old male with history of Stage D HF due to NICM on LVAD, TV annuloplasty ring 9/12/17 as destination therapy due to severe peripheral artery disease with significant stenosis  SIADH, Depression, CKD-3 with hyperkalemia, admitted 6/14/21 with symptomatic anemia with Hgb 4.5 in setting of INR 8.8, thereafter with complicated hospital course including VAP, serratia bacteremia with acalculous cholecystitis s/p percutaneous tube, abdominal pain s/p attempted SMA stent on 9/10/21, and now found to have L RP bleed.     Endocrine consulted for management of hyperthyroidism in the setting of recent amiodarone use and 2 IV contrast CTs with (7/28 and 8/13) and steroid induced hyperglycemia on tube feeds, now resolved    #Hyperthyroidism likely 2/2 Amiodarone and contrast induced thyroiditis    Most likely Type 1 AIT given abrupt presentation after Amiodarone use and positive TPO Ab. Prior to initiation of Amiodarone on 6/14, TSH was normal (1.98). Nearly two months later, on 8/7 - TSH was < 0.01. Administration of contrast also likely worsened TFTs  s/p steroid taper    Recent labs have shown increase in FT4 and TT3 since decrease in MMI dose to 15 mg from 20mg, and recent contrast loads  FT4: 4.0 -> 3.4 -> 3.5 (changed to MMI 15 mg from 20 mg on 9/15) -> 3.1 (9/18) -> 4.0 (9/20)-> 4.9 (9/21)  TT3: Increased to 133 from 90, and 92 before then    Recs  -Ideally, contrast imaging should be deferred until the patient is biochemically euthyroid as further iodine load can exacerbate thyrotoxicosis. However, if contrast imaging is required, risks of deferring the imaging need to be weighed against the risks of further contrast load in this setting.   -CT chest reviewed with radiology - no large or obvious nodules noted in thyroid, however hard to exclude smaller nodules due to presence of trach and artifact  -Worsening TFTs are likely related to recent contrast load for mesenteric angiogram  -Switch to Methimazole 10 mg BID (from 15 mg daily)   -Check Free T4 and total T3 on 9/25  -Continue Propranolol 40 mg q 8 hrs. Goal HR 60-80. HR has been in the 90s recently. Will hold off on further Propranolol adjustments at this time    #Steroid induced hyperglycemia, s/p completion of steroids  HbA1c 5.4%. FS at goal 100-180  -Continue only low dose correctional scale q 6 hrs     Discussed with Dr. Beatty and primary team    Sia Aceves MD  Endocrine Fellow  Pager: [phone]/TIMBO [phone]  Consults 9am-5pm: [phone]  After 5pm and weekends: [phone]

## 2021-09-21 NOTE — CHART NOTE - NSCHARTNOTEFT_GEN_A_CORE
Nutrition Follow Up Note  Patient seen for: Malnutrition follow-up    Chart reviewed, events noted. 66M, PMH ACC/AHA stage D HF 2/2 NICM s/p HM2 LVAD (2017). Here initially for GIB prolonged hospital course, s/p tracheostomy, acalculous cholecystitis s/p perc dawn. Patient c persistent intermittent abdominal pain, per team, possible mesenteric ischemia from possible SMA stenosis; Underwent mesenteric angiogram on 9/10, found with SMA stenosis, however unable to place stent. CTA Abd/Pelvis () reveals L RP hematoma. Tolerating TC     Source: EMR, Team/HF Rounds    Diet, NPO with Tube Feed:   Tube Feeding Modality: Nasogastric  Vital 1.5 Tank (VITAL1.5RTH)  Total Volume for 24 Hours (mL): 1560  Continuous  Starting Tube Feed Rate {mL per Hour}: 10  Increase Tube Feed Rate by (mL): 5     Every 24 hours  Until Goal Tube Feed Rate (mL per Hour): 65  Tube Feed Duration (in Hours): 24  Tube Feed Start Time: 11:45  Supplement Feeding Modality:  Nasogastric  Probiotic Yogurt/Smoothie Cans or Servings Per Day:  2       Frequency:  Daily (09-10-21 @ 21:18)    Current EN regimen provides: 1560ml formula, 2340kcals, 105g protein, 1192ml free H2O    EN provisions per chart:  Currently infusing @45ml/hr - slowly advancing to goal per team  () 59% EN goal volume achieved   () 48% EN goal volume achieved   () 41% EN goal volume achieved   () 33% EN goal volume achieved  () 29% EN goal volume achieved - EN was infusing @55ml/hr; held @ 5:00 then restarted @ 15:00 at 20ml/hr and slowly titrated back up  (9/15) 80% EN goal volume achieved        Nutrition-related concerns:  - Pt previously receiving TwoCal; discussion with Team on  to switch to Vital 1.5   - Biliary drain in place  - Reglan & Remeron ordered daily  - Multivitamin and thiamin supplementation ordered daily  - NPH & Insulin Sliding Scale ordered to optimize BG; noted to be receiving steroids which should be completed today (9/15)    GI:  Last BM .   Bowel Regimen? [] Yes   [x] No  - Zofran ordered PRN    Weight in k.1 (), 58.9 (), 59.7 (), 60 (), 60.4 (), 65.7 (09-15), 61.4 (-), 59 (), 58.4 (), 63 (), 66.2 (), 67.7 (), 71.2 ()    Weight history:   - Admission weight: 176.3 pounds / 80 kg (6/15)  significant wt loss noted; wt trending up     MEDICATIONS  (STANDING):  dextrose 40% Gel  dextrose 50% Injectable  dextrose 50% Injectable  glucagon  Injectable  insulin lispro (ADMELOG) corrective regimen sliding scale  meropenem  IVPB  methimazole  multivitamin  pantoprazole  Injectable  propranolol  sodium chloride 0.9%.  thiamine  vancomycin    Solution    Pertinent Labs:  @ 00:24: Na 131<L>, BUN 11, Cr 0.40<L>, <H>, K+ 4.6, Phos 3.2, Mg 1.8, Alk Phos 230<H>, ALT/SGPT 28, AST/SGOT 26, HbA1c --    A1C with Estimated Average Glucose Result: 5.4 % (08-15-21 @ 13:52)  A1C with Estimated Average Glucose Result: 5.6 % (06-15-21 @ 02:42)    Finger Sticks:  POCT Blood Glucose.: 118 mg/dL ( @ 11:24)  POCT Blood Glucose.: 123 mg/dL ( @ 06:20)  POCT Blood Glucose.: 117 mg/dL ( @ 22:57)  POCT Blood Glucose.: 113 mg/dL ( @ 17:32)  POCT Blood Glucose.: 109 mg/dL ( @ 11:53)    Skin per nursing documentation: no pressure injuries   Edema: none at this time     Estimated Nutrition Needs:   Using dosing weight 78.5 kg  Energy Needs (25-30 kcal/kg): 6967-8453 kcal/day  Protein Needs (1.2-1.4 g/kg):     Previous Nutrition Diagnosis: Severe chronic malnutrition  Nutrition diagnosis is ongoing & addressed with tube feeds as tolerated    No new nutrition diagnosis at this time    Recommendations:    1. Recommend continue current EN regimen via post-pyloric tube, increasing feeds as tolerated to Vital 1.5 goal rate of 65ml/hr x 24hrs. At goal to provide 1560ml formula, 2340kcals, 105g protein, 1192ml free H2O (meets ~30kcals/kg & 1.33g protein/kg based on dosing wt 78.5kg).  2. Continue micronutrient supplementation as ordered  3. RD to remain available to adjust EN formulary, volume/rate PRN.     Monitoring and Evaluation:   Continue to monitor Nutritional intake, Tolerance to diet prescription, weights, labs, skin integrity  RD remains available upon request and will follow up per protocol     Wendy Travis, MS, RD, CDN, [facility]  pager #[phone] Nutrition Follow Up Note  Patient seen for: Malnutrition follow-up    Chart reviewed, events noted. 66M, PMH ACC/AHA stage D HF 2/2 NICM s/p HM2 LVAD (2017). Here initially for GIB prolonged hospital course, s/p tracheostomy, acalculous cholecystitis s/p perc dawn. Patient c persistent intermittent abdominal pain, per team, possible mesenteric ischemia from possible SMA stenosis; Underwent mesenteric angiogram on 9/10, found with SMA stenosis, however unable to place stent. CTA Abd/Pelvis () reveals L RP hematoma. Tolerating TC - SLP following for PMV trials    Source: EMR, Team/HF Rounds    Diet, NPO with Tube Feed:   Tube Feeding Modality: Nasogastric  Vital 1.5 Tank (VITAL1.5RTH)  Total Volume for 24 Hours (mL): 1560  Continuous  Starting Tube Feed Rate {mL per Hour}: 10  Increase Tube Feed Rate by (mL): 5     Every 24 hours  Until Goal Tube Feed Rate (mL per Hour): 65  Tube Feed Duration (in Hours): 24  Tube Feed Start Time: 11:45  Supplement Feeding Modality:  Nasogastric  Probiotic Yogurt/Smoothie Cans or Servings Per Day:  2       Frequency:  Daily (09-10-21 @ 21:18)    Current EN regimen provides: 1560ml formula, 2340kcals, 105g protein, 1192ml free H2O    EN provisions per chart:  Currently infusing @45ml/hr - slowly advancing to goal per team  () 59% EN goal volume achieved   () 48% EN goal volume achieved   () 41% EN goal volume achieved   () 33% EN goal volume achieved  () 29% EN goal volume achieved - EN was infusing @55ml/hr; held @ 5:00  emesis then restarted @ 15:00 at 20ml/hr and slowly titrated back up  (9/15) 80% EN goal volume achieved        Nutrition-related concerns:  - Pt previously receiving TwoCal; discussion with Team on  to switch to Vital 1.5   - Biliary drain in place  - Reglan & Remeron ordered daily  - Multivitamin and thiamin supplementation ordered daily  - NPH & Insulin Sliding Scale ordered to optimize BG; noted to be receiving steroids which should be completed today (9/15)    GI:  Last BM .   Bowel Regimen? [] Yes   [x] No  - Zofran ordered PRN    Weight in k.1 (), 58.9 (), 59.7 (), 60 (), 60.4 (), 65.7 (09-15), 61.4 (-), 59 (), 58.4 (), 63 (), 66.2 (), 67.7 (), 71.2 ()    Weight history:   - Admission weight: 176.3 pounds / 80 kg (6/15)  significant wt loss noted; wt trending up     MEDICATIONS  (STANDING):  dextrose 40% Gel  dextrose 50% Injectable  dextrose 50% Injectable  glucagon  Injectable  insulin lispro (ADMELOG) corrective regimen sliding scale  meropenem  IVPB  methimazole  multivitamin  pantoprazole  Injectable  propranolol  sodium chloride 0.9%.  thiamine  vancomycin    Solution    Pertinent Labs:  @ 00:24: Na 131<L>, BUN 11, Cr 0.40<L>, <H>, K+ 4.6, Phos 3.2, Mg 1.8, Alk Phos 230<H>, ALT/SGPT 28, AST/SGOT 26, HbA1c --    A1C with Estimated Average Glucose Result: 5.4 % (08-15-21 @ 13:52)  A1C with Estimated Average Glucose Result: 5.6 % (06-15-21 @ 02:42)    Finger Sticks:  POCT Blood Glucose.: 118 mg/dL ( @ 11:24)  POCT Blood Glucose.: 123 mg/dL ( @ 06:20)  POCT Blood Glucose.: 117 mg/dL ( @ 22:57)  POCT Blood Glucose.: 113 mg/dL ( @ 17:32)  POCT Blood Glucose.: 109 mg/dL ( @ 11:53)    Skin per nursing documentation: no pressure injuries   Edema: none at this time     Estimated Nutrition Needs:   Using dosing weight 78.5 kg  Energy Needs (25-30 kcal/kg): 3760-0175 kcal/day  Protein Needs (1.2-1.4 g/kg):     Previous Nutrition Diagnosis: Severe chronic malnutrition  Nutrition diagnosis is ongoing & addressed with tube feeds as tolerated    No new nutrition diagnosis at this time    Recommendations:    1. Recommend continue current EN regimen via post-pyloric tube, increasing feeds as tolerated to Vital 1.5 goal rate of 65ml/hr x 24hrs. At goal to provide 1560ml formula, 2340kcals, 105g protein, 1192ml free H2O (meets ~30kcals/kg & 1.33g protein/kg based on dosing wt 78.5kg).  2. Continue micronutrient supplementation as ordered  3. RD to remain available to adjust EN formulary, volume/rate PRN.     Monitoring and Evaluation:   Continue to monitor Nutritional intake, Tolerance to diet prescription, weights, labs, skin integrity  RD remains available upon request and will follow up per protocol     Wendy Travis MS, RD, CDN, [facility]  pager #[phone]

## 2021-09-21 NOTE — PROGRESS NOTE ADULT - ATTENDING COMMENTS
Pt would not allow me to examine him and wanted to be left alone. Appreciate behavioral cardiology for providing support for him. Adjust MMI as above.  Sally Beatty MD  Endocrinology Attending  Mondays and Tuesdays 9am-6pm: [phone] (pager)  Other days, night and weekend: [phone]

## 2021-09-21 NOTE — PROGRESS NOTE ADULT - SUBJECTIVE AND OBJECTIVE BOX
Subjective: Patient seen and examined resting in bed. ON no issues.     Medications:  acetaminophen   Tablet .. 650 milliGRAM(s) Oral every 6 hours PRN  chlorhexidine 0.12% Liquid 15 milliLiter(s) Oral Mucosa two times a day  chlorhexidine 2% Cloths 1 Application(s) Topical <User Schedule>  dextrose 40% Gel 15 Gram(s) Oral once  dextrose 50% Injectable 25 Gram(s) IV Push once  dextrose 50% Injectable 12.5 Gram(s) IV Push once  glucagon  Injectable 1 milliGRAM(s) IntraMuscular once  insulin lispro (ADMELOG) corrective regimen sliding scale   SubCutaneous every 6 hours  lidocaine   4% Patch 1 Patch Transdermal daily  meropenem  IVPB 1000 milliGRAM(s) IV Intermittent every 8 hours  methimazole 15 milliGRAM(s) Oral <User Schedule>  metoclopramide Injectable 10 milliGRAM(s) IV Push every 8 hours  mirtazapine 7.5 milliGRAM(s) Oral daily  multivitamin 1 Tablet(s) Oral daily  ondansetron Injectable 4 milliGRAM(s) IV Push every 6 hours PRN  pantoprazole  Injectable 40 milliGRAM(s) IV Push every 12 hours  propranolol 40 milliGRAM(s) Oral every 8 hours  sertraline 100 milliGRAM(s) Oral daily  simethicone 80 milliGRAM(s) Chew every 8 hours PRN  sodium chloride 0.9% lock flush 10 milliLiter(s) IV Push every 1 hour PRN  sodium chloride 0.9%. 1000 milliLiter(s) IV Continuous <Continuous>  thiamine 100 milliGRAM(s) Oral daily  vancomycin    Solution 125 milliGRAM(s) Oral every 6 hours      ICU Vital Signs Last 24 Hrs  T(C): 36.6 (21 Sep 2021 08:00), Max: 36.7 (20 Sep 2021 16:00)  T(F): 97.8 (21 Sep 2021 08:00), Max: 98.1 (21 Sep 2021 00:00)  HR: 95 (21 Sep 2021 10:00) (79 - 97)  BP: --  BP(mean): --  ABP: 83/69 (21 Sep 2021 10:00) (77/60 - 102/74)  ABP(mean): 76 (21 Sep 2021 10:00) (67 - 88)  RR: 26 (21 Sep 2021 10:00) (10 - 46)  SpO2: 97% (21 Sep 2021 10:00) (94% - 100%)      Weight in k.1 ( @ 00:00)    I&O's Summary    20 Sep 2021 07:01  -  21 Sep 2021 07:00  --------------------------------------------------------  IN: 1395 mL / OUT: 1375 mL / NET: 20 mL    21 Sep 2021 07:01  -  21 Sep 2021 10:27  --------------------------------------------------------  IN: 210 mL / OUT: 250 mL / NET: -40 mL      Physical Exam  Appearance: No Acute Distress, thin-appearing, lethargic   HEENT: JVP non elevated  Cardiovascular: VAD hum  Respiratory: Clear to auscultation bilaterally  Gastrointestinal: mildly distended, tenderness in LLQ  Neurologic: Non-focal  Extremities: No LE edema, warm and well perfused  Lines/drains: perc dawn drain in place, brownish contents, no bloody contents      LVAD Interrogation  VAD TYPE HM 2  Speed 9200  Flow 5.4  Power 5.7  PI  5.8  Assessment of driveline exit site: driveline dressing c/d/i  Programming changes: no changes made    Labs:                        9.6    10.30 )-----------( 346      ( 21 Sep 2021 00:32 )             30.7         131<L>  |  97  |  11  ----------------------------<  106<H>  4.6   |  27  |  0.40<L>    Ca    9.3      21 Sep 2021 00:24  Phos  3.2       Mg     1.8         TPro  7.7  /  Alb  2.9<L>  /  TBili  0.8  /  DBili  x   /  AST  26  /  ALT  28  /  AlkPhos  230<H>                Lactate Dehydrogenase, Serum: 366 U/L ( @ 00:24)  Lactate Dehydrogenase, Serum: 372 U/L ( @ 00:28)  Lactate Dehydrogenase, Serum: 350 U/L ( @ 00:25)

## 2021-09-21 NOTE — PROGRESS NOTE ADULT - ASSESSMENT
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 coli UTIs, driveline drainage (1/7/21) and COVID-19 (back in April 2020)  He was recently seen in clinic where he complained of abdominal pain and dark stools w constipation back in May. He presents to [facility] ER today weakness and fatigue, moderate and + Black stools for three days, on coumadin secondary to warfarin use in the setting of an LVAD. Patient has required transfusions for GIB in the past. Mostly recently back in jan 2021 pt had anemia with dark stools. No interventions was done at that time. However Last Endoscopy was done in July 2020 (negative). Today labs show patient is anemic with H/H of 4.5/16.3,. INR is 8.84 MAP in the 90s,   Returned from endoscopy appearing ill tachypneic with chills with new white out of his left lung      A/p  s/p LVAD placement  admission prolonged following GI bleeding, aspiration event, CDI, VAP, chronic abdominal pain, most recently with retroperitoneal bleeding post attempted stent placement  low grade fevers and leukocytosis-   sputum with serratia and patient started on therapy with Meropenem for suspected VAP    Complete a seven day course of Meropenem currently day 5/7    Discussed with CTU    Morris Prater MD  [phone]  After 5pm/weekends [phone]

## 2021-09-21 NOTE — PROGRESS NOTE ADULT - PROBLEM SELECTOR PLAN 4
- ID following and appreciate recommendations   - serratia bacteremia and poss acalculous cholecystitis. B/c with gram + cocci and many enterobacter Carbapenem resistant strain and now s/p per dawn drain  - also with enterobacter from sputum culture 8/13  - sputum cx 9/14 positive for serratia marcescens  - Blood Cultures remains NGTD  - currently on Meropenum IV q8 (will be required to complete 8 day course)   - would recommend sending C.diff sample as patient is having loose BM

## 2021-09-22 NOTE — PROGRESS NOTE ADULT - ASSESSMENT
Assessment and Recommendation:   · Assessment	  Assessment and recommendation :  Recurrent Acute hypoxic respiratory Failure S/P tracheostomy on full ventilatory support  50% FI02,   Acute blood loss anemia S/P multiple  blood transfusion   going for mesenteric angiogram  S/P septic shock off vasopressin , levophed and off  Dobutamine   S/P cholecystostomy tube placement by IR    AF RVR back to regular sinus Rhythm   Non ischemic cardiomyopathy continue ACE inhibitor and B-Blockers   mesenteric ischemia failure to stent SMA , no plan for repeated trial   S/P 3 unit of blood transfusion   S/P Septic shock and cardiogenic shock   Stage D systolic heart failure S/P LVAD HM2   MH2 LVAD  with  TV Annuloplasty  Severe peripheral vascular disease  surgical reassessment for possible cholecystectomy   severe hyperglycemia on insulin coverage    on meropenem and po vancomycin   Reglan 10 mg for Gastric Motility   hyperthyroidism on Methimazole 10mg TID   critical care polyneuropathy   Anemia of Acute blood Loss   severe protein caloric malnutrition   S/P blood and FFP transfusion   Chronic kidney disease stage III  Depressed and withdrawn   on   NGT feeding on Vital  advanced to 55 cc/ hr   refuse PEG tube placement   ? decannulation   GI prophylaxis with PPI   Discussed with TCV team and vascular

## 2021-09-22 NOTE — PROGRESS NOTE ADULT - SUBJECTIVE AND OBJECTIVE BOX
RICKY JOINT  MRN#: [id number]  Subjective:  Pulmonary progress  : recurrent Acute hypoxic respiratory Failure ,aspiration pneumonia, NICM  , chart reviewed and H/O obtained radiological and Laboratory study reviewed patient Examined     65M PMH ACC/AHA stage D HF due to NICM HM2 LVAD , TV annuloplasty ring 17 as destination therapy due to severe peripheral artery disease with significant stenosis  SIADH, Depression, CKD-3 with hyperkalemia, past E. coli UTIs, driveline drainage (21) and COVID-19 (back in 2020)  He was recently seen in clinic where he complained of abdominal pain and dark stools w constipation back in May. He presents to [facility] ER today weakness and fatigue, moderate and + Black stools for three days, on coumadin secondary to warfarin use in the setting of an LVAD. Patient has required transfusions for GIB in the past. Mostly recently back in 2021 pt had anemia with dark stools. No interventions was done at that time. However Last Endoscopy was done in 2020 (negative). Today labs show patient is anemic with H/H of 4.5/16.3,. INR is 8.84 MAP in the 90s, Temp 35.1. He denies any chest pain, shortness of breath, dizziness, abd pain, nausea or vomiting. found to have  rectal bleeding underwent endoscopy ,old blood in the proximal ileum ,  develop sepsis with LL opacity given Antibiotics , Extubated , reintubated , Bronchoscopy on Zosyn for LL pneumonia  and Amiodarone S/P TV Annuloplasty , patient remain intubated on full ventilatory support .S/P multiple units of blood transfusion , remain on full ventilatory support on Precedex and propofol , new central IJ line , diarrhea C diff. +ve on po vancomycin and IV Flagyl,  mildly distended belly , fever start on cefepime 2gm q 8 hrs S/P tracheostomy .  new RT Subclavian central line continue on contact  isolation ,still diarrhea on C-diff antibiotics ENT follow up appreciated , trial of C-PAP as tolerated , , copious secretion from trach. site chest x ray left lower lobe pneumonia , tolerating trch. collar 50% FI02 still excessive secretion need pulmonary toilet , off Ancef antibiotic , no more diarrhea back on full support mechanical ventilator , chest x ray show improvement in LLL air space disease, more awake and responsive on tube feeding no more diarrhea , S/P  Ancef for bacteremia no fever ,ID follow up noted ,  no nausea or vomiting or diarrhea still very weak and tired , event note pulled NG tube now replaced , back on tube feeding ,still on po vancomycin , getting PT and OT at bed side , no plan for decannulation for now. , no more diarrhea receiving PT and OPT at bed side , minimal secretion from tracheostomy site , no SOB getting stronger , improve muscle tone patient transfer to monitor bed still on contact isolation for C-Difficel colitis on 50% FI02, NG tube clogged , and change to resume tube feeding still loose stool . H/H drop significantly require blood transfusion , most likely GI bleeding , IV heparin D/C , vomiting 200 cc of creamy color tube feeding on hold no sob, still melena monitor in the CTU possible capsule endoscopy , H/H is stable ., patient develop TR sided  pneumothorax require chest tube placement , RT IJ central line  placed , develop fever shaking chills , blood culture positive for serratia marcescens , start on cefepime .the patient  become hypoxic and hypotensive placed on full ventilatory support and Vasopressin , levophed and Dobutamine ,S/P blood transfusion on meropenem and vancomycin ,   , on and  off pressors , occasional agitation on Precedex .S/P IR cholecystostomy tube drainage placement in the RT upper Quadrant , resume anticoagulation chest x ray noted C-PAP trail lasted only for 2 hrs , new RT SC line and D/C RT IJ line , RT pig tail cathter has been removed , tolerating C-PAP trial placed on trach. collar 50% FI02 GI consultant noted on NG tube feeding as tolerated , develop AF RVR S/P  bolus Amiodarone  back to regular sinus Rhythm , Flat Affect depressed , back on tube feeding Vital AF at 60 cc/ hr .still intermittent abdominal pain , no fever saturation is accepted  back on full ventilatory support , tired and sleepy on round    .start  back on  tube feeding . tolerating it very well , no nausea or vomiting , good 02 saturation on trac-collar on methimazole for hyperthyroidism , no new C/O no plan for decannulation yet , electrolyte blood test is still pending .on respiratory isolation sputum culture is negative , increase WBC  improved on cefepime , sputum positive for enterococcus , condition is the same ,still C/O Abdominal pain , white count is improving , no chest pain or SOB ,  .placed on Reglan 10 mg TID for gastric motility , depressed , withdrawn. on full NG tube diet with Vital , secretion are much improved , went for mesenteric angiogram , unable to stent SMA S/P 3 units of blood transfusion on trach. collar 40% Fi02, anxious to eat discuss possible decannulation,  H/H  are stable vascular  note appreciated no mediate plan for repeat mesenteric angiogram remain on trach.-collar at 40% FI02, RT IJ in place reassessed for stent in the SMA by vascular, no plan for now depresses and withdrawn on meropenem and po vancomycin for elevated white count pending culture no plan for further vascular study or decannulation  , no events overnight , white count is trending down , dark stool stable H/H ,surgical opinion for possible Lap cholecystectomy. no plan for surgery refuse PEG tube placement      (2021 16:57)    PAST MEDICAL & SURGICAL HISTORY:  CHF (congestive heart failure)    CAD (coronary artery disease)  Depression    Pleural effusion    History of 2019 novel coronavirus disease (COVID-19)  2020    Hemorrhoids    Bleeding hemorrhoids    Peripheral arterial disease    Claudication    BPH with urinary obstruction    ACC/AHA stage D systolic heart failure  Anticoagulation goal of INR 2.0 to 2.5    Falls    Clavicle fracture    CKD (chronic kidney disease), stage III    Iron deficiency anemia    H/O epistaxis    Vertigo    GI bleed    S/P TVR (tricuspid valve repair)    S/P ventricular assist device    S/P endoscopy    OBJECTIVE:  ICU Vital Signs Last 24 Hrs  T(C): 38.2 (2021 10:00), Max: 38.5 (2021 12:00)  T(F): 100.8 (2021 10:00), Max: 101.3 (2021 12:00)  HR: 65 (2021 10:00) (61 - 69)  BP: --  BP(mean): --  ABP: 105/67 (2021 10:00) (90/54 - 113/64)  ABP(mean): 77 (2021 10:00) (63 - 77)  RR: 20 (2021 10:00) (19 - 35)  SpO2: 99% (2021 10:00) (96% - 100%)      - @ 07:01  -  06- @ 07:00  --------------------------------------------------------  IN: 2693.9 mL / OUT: 1415 mL / NET: 1278.9 mL     @ 07: @ 10:49  --------------------------------------------------------  IN: 420.8 mL / OUT: 115 mL / NET: 305.8 mL  PHYSICAL EXAM:Daily   Elderly male S/P tracheostomy on full ventilatory support  50% FI02 ,  Daily Weight in k.4 (2021 00:00)  HEENT:     + NCAT  + EOMI  - Conjuctival edema   - Icterus   - Thrush   - ETT  + NGT/OGT  Neck:         + FROM  RT IJ  line JVD  - Nodes - Masses + Mid-line trachea + Tracheostomy  Chest:            normal A-P diameter    Lungs:          + CTA   + Rhonchi    - Rales    - Wheezing + Decreased  LT BS   - Dullness R L  Cardiac:       + S1 + S2    + RRR   - Irregular   - S3  - S4    - Murmurs   - Rub   - Hamman’s sign   Abdomen:    + BS  + Soft + Non-tender - Distended - Organomegaly - PEG .cholecystostomy tube in place  Extremities:   - Cyanosis U/L   - Clubbing  U/L  + LE/UE Edema   + Capillary refill    + Pulses   Neuro:        - Awake   -  Alert   - Confused   - Lethargic   + Sedated  + Generalized Weakness  Skin:        - Rashes    - Erythema   + Normal incisions   + IV sites intact          HOSPITAL MEDICATIONS: All medications reviewed and analyzed  MEDICATIONS  (STANDING):  amiodarone    Tablet 200 milliGRAM(s) Oral daily  chlorhexidine 0.12% Liquid 15 milliLiter(s) Oral Mucosa every 12 hours  chlorhexidine 2% Cloths 1 Application(s) Topical <User Schedule>  dexmedetomidine Infusion 0.5 MICROgram(s)/kG/Hr (9.81 mL/Hr) IV Continuous <Continuous>  dextrose 50% Injectable 50 milliLiter(s) IV Push every 15 minutes  heparin  Infusion 400 Unit(s)/Hr (12.5 mL/Hr) IV Continuous <Continuous>  Hydromorphone  Injectable 0.5 milliGRAM(s) IV Push once  insulin lispro (ADMELOG) corrective regimen sliding scale   SubCutaneous every 6 hours  pantoprazole  Injectable 40 milliGRAM(s) IV Push every 12 hours  piperacillin/tazobactam IVPB.. 3.375 Gram(s) IV Intermittent every 8 hours  propofol Infusion 20 MICROgram(s)/kG/Min (9.42 mL/Hr) IV Continuous <Continuous>  sodium chloride 0.9% lock flush 3 milliLiter(s) IV Push every 8 hours  sodium chloride 0.9%. 1000 milliLiter(s) (10 mL/Hr) IV Continuous <Continuous>    MEDICATIONS  (PRN):  acetaminophen    Suspension .. 650 milliGRAM(s) Oral every 6 hours PRN Temp greater or equal to 38C (100.4F)    LABS: All Lab data reviewed and analyzed                         10.2   9.66  )-----------( 345      ( 22 Sep 2021 01:05 )             32.0               -    131<L>  |  94<L>  |  10  ----------------------------<  123<H>  4.2   |  26  |  0.41<L>    Ca    9.3      22 Sep 2021 01:05  Phos  3.3     -  Mg     1.8         TPro  8.2  /  Alb  3.1<L>  /  TBili  0.8  /  DBili  x   /  AST  31  /  ALT  32  /  AlkPhos  233<H>        Ca    9.3      21 Sep 2021 00:24  Phos  3.2     09-21  Mg     1.8         TPro  7.7  /  Alb  2.9<L>  /  TBili  0.8  /  DBili  x   /  AST  26  /  ALT  28  /  AlkPhos  230<H>      Ca    9.1      20 Sep 2021 00:28  Phos  2.9     09-20  Mg     2.0     -                                                                                                                                                  PTT - ( 2021 04:52 )  PTT:45.2 sec LIVER FUNCTIONS - ( 2021 00:42 )  Alb: 3.4 g/dL / Pro: 6.7 g/dL / ALK PHOS: 213 U/L / ALT: 15 U/L / AST: 24 U/L / GGT: x           RADIOLOGY: - Reviewed and analyzed RT Pig tail cathter  , LVAD HM2, CT scan of abdomen reviewed result noted

## 2021-09-22 NOTE — PROGRESS NOTE ADULT - SUBJECTIVE AND OBJECTIVE BOX
RICKY HAMPTON  MRN-[id number]  Patient is a 66y old  Male who presents with a chief complaint of Anemia, Supratherapeutic INR, Dark Stools (21 Sep 2021 20:32)    HPI:  65M PMH ACC/AHA stage D HF due to NICM HM2 LVAD , TV annuloplasty ring 17 as destination therapy due to severe peripheral artery disease with significant stenosis  SIADH, Depression, CKD-3 with hyperkalemia, past E. coli UTIs, driveline drainage (21) and COVID-19 (back in 2020)  He was recently seen in clinic where he complained of abdominal pain and dark stools w constipation back in May. He presents to [facility] ER today weakness and fatigue, moderate and + Black stools for three days, on coumadin secondary to warfarin use in the setting of an LVAD. Patient has required transfusions for GIB in the past. Mostly recently back in 2021 pt had anemia with dark stools. No interventions was done at that time. However Last Endoscopy was done in 2020 (negative). Today labs show patient is anemic with H/H of 4.5/16.3,. INR is 8.84 MAP in the 90s, Temp 35.1. He denies any chest pain, shortness of breath, dizziness, abd pain, nausea or vomiting.       (2021 16:57)      Surgery/Hospital Course:   admit for melena w/ anemia, INR 8.84   6/15 Capsul study (+) for small bowel bleed, balloon endoscopy (old blood in prox ileum); post EGD - septic w/ L opacity, re-intubated for concern for aspiration, TTE (Mod MR, decrease biV w/ interventricular septum boweing towards R)   bronch    +C Diff    CT C/A/P: Fluid filled colon which may be 2/2 rapid transit. Small bilateral pleffs with associates. Compressive atelectasis New ISABELLE & LLL  parenchymal opacities, suspicious for pneumonia. 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    #8 Shiley trach at bedside    LVAD speed increased to 9200   Bronch   TC since . Patient transferred to SDU.    INR today 2.64.  H&H 7.3/24 this AM.  Will repeat CBC at noon, and will send stool guaiac Patient with persistent abdominal tenderness, rate of tube feeds decreased.  No nausea/vomiting.     INR today 2.4. H&H 9.1/28.6 low flow overnight /N&V, refusing Tube feeds on D5 1/2 normal  @50 cc/hr   INR 2.69  H&H 7.7/.1 refusing Tube feeds on D5 1/2 normal  @50 cc/hr. This am + BM Melena Dr Oneill HF  aware- PRBC x1  GI team consulted -  NPO  plan on study in am-  D/w Dr Cadet Patient  to return to CTU for further management; 1PRBCS    Post op INR 2.2 today.  No bleeding. BC + for SM.  Pt is hypotensive requiring pressor and inotropic support.  ID follow up today on Cefepime will follow.   R PTC for PTX    CT C/A/P: sub q emphysema in R chest wall, GGO RUL, small ascites CTH negative; Abd US: GB thickening, pericholecystic fluid     Perchole drain in place continues to drain total output overnight 133.  Fever today 38.8    duplex LE negative    Patient with persistent abdominal pain, refusing tube feeds and medications, Psych consulted   CTA A/P ordered to r/o mesenteric ischemia 2/2 persistent anorexia, nausea, vomiting. Revealed:  Evaluation of the mesenteric vessels is limited by streak artifact from LVAD. There appears to be severe stenosis of the proximal SMA; abdominal mesenteric doppler is recommended for further evaluation. 2.  No small bowel findings to suggest acute mesenteric ischemia. 3.  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  8/15: Cultures resulted BC 1/2 +GPC in clusters, SC enterobacter; mesenteric duplex: borderline stenosis of proximal SMA  : CT C/A/P noncon: Nondular opacities in R lung apex w/cavitation, abd nl  :  Continue current care, treatment of thyrotoxicosis with medications as per endocrine, d/c ABX as per team.    RUQ sono: Contracted gallbladder with cholecystostomy tube in place.  9/10 failed SMA stent, L fem PSA, s/p 3U PRCB   CTA Abd/Pelvis L RP hematoma     Today:  TC since , continue TC as tolerated. Pt would likely benefit from a PEJ given persistent TF intolerance, N/V. Pending general surgery recommendations re: potential planning for PEJ placement, cholecystostomy removal, cholecystectomy.      REVIEW OF SYSTEMS:  Unable to obtain as patient has trach    ICU Vital Signs Last 24 Hrs  T(C): 35.9 (22 Sep 2021 08:00), Max: 36.7 (21 Sep 2021 20:00)  T(F): 96.7 (22 Sep 2021 08:00), Max: 98.1 (21 Sep 2021 20:00)  HR: 91 (22 Sep 2021 11:00) (79 - 98)  BP: --  BP(mean): --  ABP: 91/71 (22 Sep 2021 11:00) (80/63 - 113/93)  ABP(mean): 81 (22 Sep 2021 11:00) (67 - 98)  RR: 45 (22 Sep 2021 11:00) (20 - 45)  SpO2: 100% (22 Sep 2021 11:00) (95% - 100%)      Physical Exam:  Gen:  Awake, alert, NAD  CNS:  intact, nonfocal, responds to all commands  Neck: no JVD, +TC   RES : course breath sounds, no wheezing, moderate tan secretions able to cough up    CVS: +LVAD hum   Abd: Soft. Sybil drain with bilious fluid. Positive BS throughout. Mild RUQ abdominal tenderness by perc sybil.  Skin: No rash, erythema, cyanosis.  Vasc: Warm and well-perfused.  Ext:  no edema    ============================I/O===========================   I&O's Detail    21 Sep 2021 07:01  -  22 Sep 2021 07:00  --------------------------------------------------------  IN:    Enteral Tube Flush: 180 mL    IV PiggyBack: 150 mL    Miscellaneous Tube Feedin mL    sodium chloride 0.9%: 120 mL  Total IN: 1535 mL    OUT:    Drain (mL): 400 mL    Voided (mL): 1100 mL  Total OUT: 1500 mL    Total NET: 35 mL      22 Sep 2021 07:01  -  22 Sep 2021 11:46  --------------------------------------------------------  IN:    Miscellaneous Tube Feedin mL    sodium chloride 0.9%: 20 mL  Total IN: 220 mL    OUT:  Total OUT: 0 mL    Total NET: 220 mL        ============================ LABS =========================                        10.2   9.66  )-----------( 345      ( 22 Sep 2021 01:05 )             32.0         131<L>  |  94<L>  |  10  ----------------------------<  123<H>  4.2   |  26  |  0.41<L>    Ca    9.3      22 Sep 2021 01:05  Phos  3.3       Mg     1.8         TPro  8.2  /  Alb  3.1<L>  /  TBili  0.8  /  DBili  x   /  AST  31  /  ALT  32  /  AlkPhos  233<H>      LIVER FUNCTIONS - ( 22 Sep 2021 01:05 )  Alb: 3.1 g/dL / Pro: 8.2 g/dL / ALK PHOS: 233 U/L / ALT: 32 U/L / AST: 31 U/L / GGT: x             ABG - ( 22 Sep 2021 00:52 )  pH, Arterial: 7.41  pH, Blood: x     /  pCO2: 51    /  pO2: 166   / HCO3: 32    / Base Excess: 6.6   /  SaO2: 99.9                ======================Micro/Rad/Cardio=================  Culture: Reviewed   CXR: Reviewed  Echo:Reviewed  ======================================================  PAST MEDICAL & SURGICAL HISTORY:  CHF (congestive heart failure)    CAD (coronary artery disease)    Depression    Pleural effusion    History of  novel coronavirus disease (COVID-19)  2020    Hemorrhoids    Bleeding hemorrhoids    Peripheral arterial disease    Claudication    BPH with urinary obstruction    ACC/AHA stage D systolic heart failure    Anticoagulation goal of INR 2.0 to 2.5    Falls    Clavicle fracture    CKD (chronic kidney disease), stage III    Iron deficiency anemia    H/O epistaxis    Vertigo    GI bleed    S/P TVR (tricuspid valve repair)    S/P ventricular assist device    S/P endoscopy      ====================ASSESSMENT ==============  Stage D Nonischemic Cardiomyopathy, Status Post HM2 on 2017    Cardiogenic shock  Hemodynamic instability   Acute hypoxemic respiratory failure s/p trach   GI bleed , Status Post Enteroscopy   Anemia, in setting of melena   Chronic Kidney Disease  Stress hyperglycemia   C.diff positive on    Hypovolemic shock  Septic shock  Leukocytosis  GB thickening/percholecystic s/p perc choley by IT   SMA stenosis  Serratia/citrobacter pneumonia   Stenotrophomonas pneumonia   Enterobacter pneumonia   Nasuea/vomiting  Deconditioning    Plan:  ====================== NEUROLOGY=====================  Nonfocal, continue to monitor neuro status    Continue ambulation as tolerated   Tylenol PRN for analgesia   C/w mirtazapine and sertraline for depression  Deconditioned, PT/Ambulate as tolerated    acetaminophen   Tablet .. 650 milliGRAM(s) Oral every 6 hours PRN Mild Pain (1 - 3)  mirtazapine 7.5 milliGRAM(s) Oral daily  sertraline 100 milliGRAM(s) Oral daily    ==================== RESPIRATORY======================  Acute hypoxemic respiratory failure s/p #8 betzaida trach on , continue TC as tolerated (TC since )  Continue close monitoring of respiratory rate and breathing pattern, following of ABG’s with A-line monitoring, continuous pulse oximetry monitoring.   Moderate secretions, continue suctioning prn, pulmonary toileting.       ====================CARDIOVASCULAR==================  Stage D Nonischemic Cardiomyopathy, Status Post HM2 on 2017; LVAD settings 9200, flow 5.3  TTE : reveals at least moderate MR, mild AI, severe global LV syst dysfxn, dec RV fxn   Propranolol for rate control of HR 2/2 Hyperthyroidism, titrate as tolerated per Endo  Continue invasive hemodynamic monitoring     propranolol 40 milliGRAM(s) Oral every 8 hours    ===================HEMATOLOGIC/ONC ===================  CTA A/P on  +L RP hematoma   Acute blood loss anemia, monitor H&H/Plts    Holding coumadin and ASA given recurrent GI bleeding    ===================== RENAL =========================  Continue monitoring urine output, I&OS, BUN/Cr   Euvolemic, even fluid balance, currently off diuretics.    Replete lytes PRN. Keep K> 4 and Mg >2     ==================== GASTROINTESTINAL===================  S/p cholecystostomy tube placed on  with IR , tolerating TFs @ 50cc/hr   CTA A/P : Evaluation of the mesenteric vessels is limited by streak artifact from LVAD. There appears to be severe stenosis of the proximal SMA; abdominal mesenteric doppler is recommended for further evaluation. 2.  No small bowel findings to suggest acute mesenteric ischemia. 3.  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  Mesenteric duplex on 8/15 borderline stenosis of proximal SMA, s/p failed SMA stent, L fem RP hematoma on 9/10, s/p 3U PRBC.   Reglan for gut motility  Zofran PRN nausea/vomiting  Pt would likely benefit from a PEJ given persistent TF intolerance, N/V  Pending general surgery recommendation re: potential planning for PEJ placement, cholecystostomy removal, cholecystectomy     multivitamin 1 Tablet(s) Oral daily  GI prophylaxis, pantoprazole  Injectable 40 milliGRAM(s) IV Push every 12 hours  metoclopramide Injectable 10 milliGRAM(s) IV Push every 8 hours  ondansetron Injectable 4 milliGRAM(s) IV Push every 6 hours PRN Nausea and/or Vomiting  simethicone 80 milliGRAM(s) Chew every 8 hours PRN Gas  sodium chloride 0.9% lock flush 10 milliLiter(s) IV Push every 1 hour PRN Pre/post blood products, medications, blood draw, and to maintain line patency  sodium chloride 0.9%. 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 - Free T4 remains elevated   Per endocrine      - Thyroid US when trach is out      - Propanolol as above for optimal T4-->T3 conversion - titrate as tolerated       - c/w Methimazole ---> changed to 10mg Q8 yesterday       - s/p hydrocortisone    dextrose 40% Gel 15 Gram(s) Oral once  dextrose 50% Injectable 25 Gram(s) IV Push once  dextrose 50% Injectable 12.5 Gram(s) IV Push once  glucagon  Injectable 1 milliGRAM(s) IntraMuscular once  insulin lispro (ADMELOG) corrective regimen sliding scale   SubCutaneous every 6 hours  methimazole 10 milliGRAM(s) Oral every 8 hours    ========================INFECTIOUS DISEASE================  Afebrile, WBC down trending 10.30->9.66   Continue trending WBC and monitoring fever curve  SCx : +Moderate Serratia marcescens, BCx on 9/15 BGTD   C. diff ppx coverage with Vancomycin PO   Meropenem empirically for possible PNA for 7 days    meropenem  IVPB 1000 milliGRAM(s) IV Intermittent every 8 hours  vancomycin    Solution 125 milliGRAM(s) Oral every 6 hours      Patient requires continuous monitoring with bedside rhythm monitoring, pulse ox monitoring, and intermittent blood gas analysis. Care plan discussed with ICU care team. Patient remained critical and at risk for life threatening decompensation.     By signing my name below, I, Agnieszka Cherry, attest that this documentation has been prepared under the direction and in the presence of CLAUDIA Rossi   Electronically signed: Cesia Shaffer, 21 @ 11:47    I, Maryann Cullen, personally performed the services described in this documentation. all medical record entries made by the luisibe were at my direction and in my presence. I have reviewed the chart and agree that the record reflects my personal performance and is accurate and complete  Electronically signed: CLAUDIA Rossi        RICKY HAMPTON  MRN-[id number]  Patient is a 66y old  Male who presents with a chief complaint of Anemia, Supratherapeutic INR, Dark Stools (21 Sep 2021 20:32)    HPI:  65M PMH ACC/AHA stage D HF due to NICM HM2 LVAD , TV annuloplasty ring 17 as destination therapy due to severe peripheral artery disease with significant stenosis  SIADH, Depression, CKD-3 with hyperkalemia, past E. coli UTIs, driveline drainage (21) and COVID-19 (back in 2020)  He was recently seen in clinic where he complained of abdominal pain and dark stools w constipation back in May. He presents to [facility] ER today weakness and fatigue, moderate and + Black stools for three days, on coumadin secondary to warfarin use in the setting of an LVAD. Patient has required transfusions for GIB in the past. Mostly recently back in 2021 pt had anemia with dark stools. No interventions was done at that time. However Last Endoscopy was done in 2020 (negative). Today labs show patient is anemic with H/H of 4.5/16.3,. INR is 8.84 MAP in the 90s, Temp 35.1. He denies any chest pain, shortness of breath, dizziness, abd pain, nausea or vomiting.       (2021 16:57)      Surgery/Hospital Course:   admit for melena w/ anemia, INR 8.84   6/15 Capsul study (+) for small bowel bleed, balloon endoscopy (old blood in prox ileum); post EGD - septic w/ L opacity, re-intubated for concern for aspiration, TTE (Mod MR, decrease biV w/ interventricular septum boweing towards R)   bronch    +C Diff    CT C/A/P: Fluid filled colon which may be 2/2 rapid transit. Small bilateral pleffs with associates. Compressive atelectasis New ISABELLE & LLL  parenchymal opacities, suspicious for pneumonia. 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    #8 Shiley trach at bedside    LVAD speed increased to 9200   Bronch   TC since . Patient transferred to SDU.    INR today 2.64.  H&H 7.3/24 this AM.  Will repeat CBC at noon, and will send stool guaiac Patient with persistent abdominal tenderness, rate of tube feeds decreased.  No nausea/vomiting.     INR today 2.4. H&H 9.1/28.6 low flow overnight /N&V, refusing Tube feeds on D5 1/2 normal  @50 cc/hr   INR 2.69  H&H 7.7/.1 refusing Tube feeds on D5 1/2 normal  @50 cc/hr. This am + BM Melena Dr Oneill HF  aware- PRBC x1  GI team consulted -  NPO  plan on study in am-  D/w Dr Cadet Patient  to return to CTU for further management; 1PRBCS    Post op INR 2.2 today.  No bleeding. BC + for SM.  Pt is hypotensive requiring pressor and inotropic support.  ID follow up today on Cefepime will follow.   R PTC for PTX    CT C/A/P: sub q emphysema in R chest wall, GGO RUL, small ascites CTH negative; Abd US: GB thickening, pericholecystic fluid     Perchole drain in place continues to drain total output overnight 133.  Fever today 38.8    duplex LE negative    Patient with persistent abdominal pain, refusing tube feeds and medications, Psych consulted   CTA A/P ordered to r/o mesenteric ischemia 2/2 persistent anorexia, nausea, vomiting. Revealed:  Evaluation of the mesenteric vessels is limited by streak artifact from LVAD. There appears to be severe stenosis of the proximal SMA; abdominal mesenteric doppler is recommended for further evaluation. 2.  No small bowel findings to suggest acute mesenteric ischemia. 3.  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  8/15: Cultures resulted BC 1/2 +GPC in clusters, SC enterobacter; mesenteric duplex: borderline stenosis of proximal SMA  : CT C/A/P noncon: Nondular opacities in R lung apex w/cavitation, abd nl  :  Continue current care, treatment of thyrotoxicosis with medications as per endocrine, d/c ABX as per team.    RUQ sono: Contracted gallbladder with cholecystostomy tube in place.  9/10 failed SMA stent, L fem PSA, s/p 3U PRCB   CTA Abd/Pelvis L RP hematoma     Today:  TC since , continue TC as tolerated. Pt would likely benefit from a PEJ given persistent TF intolerance, however pt refuses. Slowly increasing TF. Pending general surgery recommendations re: potential planning for cholecystostomy removal, cholecystectomy.      REVIEW OF SYSTEMS:  Unable to obtain as patient has trach    ICU Vital Signs Last 24 Hrs  T(C): 35.9 (22 Sep 2021 08:00), Max: 36.7 (21 Sep 2021 20:00)  T(F): 96.7 (22 Sep 2021 08:00), Max: 98.1 (21 Sep 2021 20:00)  HR: 91 (22 Sep 2021 11:00) (79 - 98)  ABP: 91/71 (22 Sep 2021 11:00) (80/63 - 113/93)  ABP(mean): 81 (22 Sep 2021 11:00) (67 - 98)  RR: 45 (22 Sep 2021 11:00) (20 - 45)  SpO2: 100% (22 Sep 2021 11:00) (95% - 100%)      Physical Exam:  Gen:  Awake, alert, NAD  CNS:  intact, nonfocal, responds to all commands  Neck: no JVD, +TC   RES : course breath sounds, no wheezing, moderate tan secretions able to cough up    CVS: +LVAD hum   Abd: Soft. Sybil drain with bilious fluid. Positive BS throughout. Mild RUQ abdominal tenderness by perc sybil.  Skin: No rash, erythema, cyanosis.  Vasc: Warm and well-perfused.  Ext:  no edema    ============================I/O===========================   I&O's Detail    21 Sep 2021 07:01  -  22 Sep 2021 07:00  --------------------------------------------------------  IN:    Enteral Tube Flush: 180 mL    IV PiggyBack: 150 mL    Miscellaneous Tube Feedin mL    sodium chloride 0.9%: 120 mL  Total IN: 1535 mL    OUT:    Drain (mL): 400 mL    Voided (mL): 1100 mL  Total OUT: 1500 mL    Total NET: 35 mL      22 Sep 2021 07:01  -  22 Sep 2021 11:46  --------------------------------------------------------  IN:    Miscellaneous Tube Feedin mL    sodium chloride 0.9%: 20 mL  Total IN: 220 mL    OUT:  Total OUT: 0 mL    Total NET: 220 mL        ============================ LABS =========================                        10.2   9.66  )-----------( 345      ( 22 Sep 2021 01:05 )             32.0         131<L>  |  94<L>  |  10  ----------------------------<  123<H>  4.2   |  26  |  0.41<L>    Ca    9.3      22 Sep 2021 01:05  Phos  3.3       Mg     1.8         TPro  8.2  /  Alb  3.1<L>  /  TBili  0.8  /  DBili  x   /  AST  31  /  ALT  32  /  AlkPhos  233<H>      LIVER FUNCTIONS - ( 22 Sep 2021 01:05 )  Alb: 3.1 g/dL / Pro: 8.2 g/dL / ALK PHOS: 233 U/L / ALT: 32 U/L / AST: 31 U/L / GGT: x             ABG - ( 22 Sep 2021 00:52 )  pH, Arterial: 7.41  pH, Blood: x     /  pCO2: 51    /  pO2: 166   / HCO3: 32    / Base Excess: 6.6   /  SaO2: 99.9                ======================Micro/Rad/Cardio=================  Culture: Reviewed   CXR: Reviewed  Echo:Reviewed  ======================================================  PAST MEDICAL & SURGICAL HISTORY:  CHF (congestive heart failure)    CAD (coronary artery disease)    Depression    Pleural effusion    History of  novel coronavirus disease (COVID-19)  2020    Hemorrhoids    Bleeding hemorrhoids    Peripheral arterial disease    Claudication    BPH with urinary obstruction    ACC/AHA stage D systolic heart failure    Anticoagulation goal of INR 2.0 to 2.5    Falls    Clavicle fracture    CKD (chronic kidney disease), stage III    Iron deficiency anemia    H/O epistaxis    Vertigo    GI bleed    S/P TVR (tricuspid valve repair)    S/P ventricular assist device    S/P endoscopy      ====================ASSESSMENT ==============  Stage D Nonischemic Cardiomyopathy, Status Post HM2 on 2017    Cardiogenic shock  Hemodynamic instability   Acute hypoxemic respiratory failure s/p trach   GI bleed , Status Post Enteroscopy   Anemia, in setting of melena   Chronic Kidney Disease  Stress hyperglycemia   C.diff positive on    Hypovolemic shock  Septic shock  Leukocytosis  GB thickening/percholecystic s/p perc choley by IT   SMA stenosis  Serratia/citrobacter pneumonia   Stenotrophomonas pneumonia   Enterobacter pneumonia   Nasuea/vomiting  Deconditioning    Plan:  ====================== NEUROLOGY=====================  Nonfocal, continue to monitor neuro status    Continue ambulation as tolerated   Tylenol PRN for analgesia   C/w mirtazapine and sertraline for depression  Deconditioned, PT/Ambulate as tolerated    acetaminophen   Tablet .. 650 milliGRAM(s) Oral every 6 hours PRN Mild Pain (1 - 3)  mirtazapine 7.5 milliGRAM(s) Oral daily  sertraline 100 milliGRAM(s) Oral daily    ==================== RESPIRATORY======================  Acute hypoxemic respiratory failure s/p #8 shiley trach on , continue TC as tolerated (TC since )  Continue close monitoring of respiratory rate and breathing pattern, following of ABG’s with A-line monitoring, continuous pulse oximetry monitoring.   Moderate secretions, continue suctioning prn, pulmonary toileting.       ====================CARDIOVASCULAR==================  Stage D Nonischemic Cardiomyopathy, Status Post HM2 on 2017; LVAD settings 9200, flow 5.3  TTE : reveals at least moderate MR, mild AI, severe global LV syst dysfxn, dec RV fxn   Propranolol for rate control of HR 2/2 Hyperthyroidism, titrate as tolerated per Endo  Continue invasive hemodynamic monitoring     propranolol 40 milliGRAM(s) Oral every 8 hours    ===================HEMATOLOGIC/ONC ===================  CTA A/P on  +L RP hematoma   Acute blood loss anemia, monitor H&H/Plts    Holding coumadin and ASA given recurrent GI bleeding    ===================== RENAL =========================  Continue monitoring urine output, I&OS, BUN/Cr   Euvolemic, even fluid balance, currently off diuretics.    Replete lytes PRN. Keep K> 4 and Mg >2     ==================== GASTROINTESTINAL===================  S/p cholecystostomy tube placed on  with IR , tolerating TFs @ 50cc/hr   CTA A/P : Evaluation of the mesenteric vessels is limited by streak artifact from LVAD. There appears to be severe stenosis of the proximal SMA; abdominal mesenteric doppler is recommended for further evaluation. 2.  No small bowel findings to suggest acute mesenteric ischemia. 3.  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  Mesenteric duplex on 8/15 borderline stenosis of proximal SMA, s/p failed SMA stent, L fem RP hematoma on 9/10, s/p 3U PRBC.   Reglan for gut motility  Zofran PRN nausea/vomiting  Pt would likely benefit from a PEJ given persistent TF intolerance, N/V  Pending general surgery recommendation re: potential planning for PEJ placement, cholecystostomy removal, cholecystectomy     multivitamin 1 Tablet(s) Oral daily  GI prophylaxis, pantoprazole  Injectable 40 milliGRAM(s) IV Push every 12 hours  metoclopramide Injectable 10 milliGRAM(s) IV Push every 8 hours  ondansetron Injectable 4 milliGRAM(s) IV Push every 6 hours PRN Nausea and/or Vomiting  simethicone 80 milliGRAM(s) Chew every 8 hours PRN Gas  sodium chloride 0.9% lock flush 10 milliLiter(s) IV Push every 1 hour PRN Pre/post blood products, medications, blood draw, and to maintain line patency  sodium chloride 0.9%. 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 - Free T4 remains elevated   Per endocrine      - Thyroid US when trach is out      - Propanolol as above for optimal T4-->T3 conversion - titrate as tolerated       - c/w Methimazole ---> changed to 10mg Q8 yesterday       - s/p hydrocortisone    dextrose 40% Gel 15 Gram(s) Oral once  dextrose 50% Injectable 25 Gram(s) IV Push once  dextrose 50% Injectable 12.5 Gram(s) IV Push once  glucagon  Injectable 1 milliGRAM(s) IntraMuscular once  insulin lispro (ADMELOG) corrective regimen sliding scale   SubCutaneous every 6 hours  methimazole 10 milliGRAM(s) Oral every 8 hours    ========================INFECTIOUS DISEASE================  Afebrile, WBC down trending 10.30->9.66   Continue trending WBC and monitoring fever curve  SCx : +Moderate Serratia marcescens, BCx on 9/15 BGTD   C. diff ppx with Vancomycin PO   Meropenem empirically for possible PNA for 7 days    meropenem  IVPB 1000 milliGRAM(s) IV Intermittent every 8 hours  vancomycin    Solution 125 milliGRAM(s) Oral every 6 hours      Patient requires continuous monitoring with bedside rhythm monitoring, pulse ox monitoring, and intermittent blood gas analysis. Care plan discussed with ICU care team. Patient remained critical and at risk for life threatening decompensation.     By signing my name below, IAgnieszka, attest that this documentation has been prepared under the direction and in the presence of CLAUDIA Rossi   Electronically signed: Cesia Shaffer, 21 @ 11:47    I, Maryann Cullen, personally performed the services described in this documentation. all medical record entries made by the luisibmel were at my direction and in my presence. I have reviewed the chart and agree that the record reflects my personal performance and is accurate and complete  Electronically signed: CLAUDIA Rossi

## 2021-09-23 NOTE — SWALLOW BEDSIDE ASSESSMENT ADULT - COMMENTS
7/26 R PCT for PTX  7/28 CT C/A/P: sub q emphysema in R chest wall, GGO RUL, small ascites CTH negative; Abd US: GB thickening, pericholecystic fluid  7/31 Perchole drain in place continues to drain total output overnight 133.  Fever today 38.8 given tylenol.  Will repeat BC now.  8/1 duplex LE negative   8/7 Persistent abd pain, refusing tube feeds and meds, Psych consulted  8/8 tolerating TC ATC  8/10: Thyroid US to evaluate for thyroid nodule/goiter in setting of hyperthyroid - contraindicated at this time 2/2 trach, will continue methimazole and monitor TSH closely. Started on Propranolol for HR rate control from Hyperthyroidism. Given Ketamine 30 IV x2 for mood. Tolerating tube feeds now, increasing to goal and d/c'd maintenance fluids.    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 Short term goal is maintenance of adequate nutrition and eventual trach decannulation. Underwent mesenteric angiogram on 9/10 with vascular surgery. Found to have SMA stenosis at its origin but unable to place stent. Intra-op received 3uPRBC and required fem stop placement for L pseudoaneurysm. Post-op continued to have drop in H/H requiring additional 2u PRBC (last on 9/12), CT angio revealed left-sided retroperitoneal hematoma. He continues to have worsening leukocytosis and low grade fevers (Tmax 101.7). All cultures have remained NGTD, all lines were exchanged and patient was remains on broad spectrum abx.    9/23: decannulated
Sx/Hosp Course:  6/14 admit for melena w/ anemia, INR 8.84   6/15 Capsule study (+) for small bowel bleed, balloon endoscopy (old blood in prox ileum); post EGD - septic w/ L opacity, re-intubated for concern for aspiration, TTE (Mod MR, decrease biV w/ interventricular septum boweing towards R)  6/20 +C Diff   6/22 CT C/A/P: Fluid filled colon which may be 2/2 rapid transit. Small b/l pl effs with associated compressive atelectasis New ISABELLE & LLL  parenchymal opacities, suspicious for PNA. Mod stenosis in proximal superior mesenteric artery.   6/25 #8 Shiley trach at bedside   7/20 TC since 7/7, continue as tolerated. Pt transferred to SDU.   7/23 INR today 2.64.  H&H 7.3/24 this AM.  Will repeat CBC, will send stool guaiac. Persistent abdominal tenderness, rate of tube feeds decreased.  No N/V.    7/24 INR today 2.4H&H 9.1/28.6 low flow overnight /N&V  NPO resolved for capsule study mondayn Coumadin on hold, refusing Tube feeds on D5.  7/25 INR 2.69  H&H 7.7/25.1 refusing Tube feeds on D5 @50 cc/hr. This am + BM Melena - PRBC x1  GI team consulted - NPO, Pt to return to CTU for further management; 1PRBCS  7/27 Post op INR 2.2 today. No bleeding. BC + for SM. Pt hypotensive requiring pressor and inotropic support.   7/26 R PCT for PTX   7/28 CT C/A/P: sub q emphysema in R chest wall, GGO RUL, small ascites CTH negative; Abd US: GB thickening, pericholecystic fluid    7/31 Perchole drain in place continues to drain total output overnight 133.  Fever today 38.8 given tylenol.  Will repeat BC now.    8/1 duplex LE negative   8/7 Persistent abd pain, refusing tube feeds and meds, Psych consulted  8/8 tolerating TC ATC  8/10: Thyroid US to evaluate for thyroid nodule/goiter in setting of hyperthyroid - contraindicated at this time 2/2 trach, will continue methimazole and monitor TSH closely. Started on Propranolol for HR rate control from Hyperthyroidism. Given Ketamine 30 IV x2 for mood. Tolerating tube feeds now, increasing to goal and d/c'd maintenance fluids.

## 2021-09-23 NOTE — PROGRESS NOTE ADULT - ASSESSMENT
65 YO M with a history of stage D NICM s/p HM2 on 9/2017 as DT (due to severe PAD) with TV ring, prior COVID-19 infection 4/2020, recurrent syncope post LVAD s/p ILR, and chronic abdominal pain with prior negative workup who was admitted 6/14/21 with symptomatic anemia with Hgb 4.5 in setting of INR 8.8 without hemodynamic instability and has since had a protracted hospitalization. He was transfused and underwent VCE which showed active bleeding in the mid small bowel but subsequent enteroscopy 6/15 did not reveal any active bleeding. He acutely decompensated after procedure with fever/hypertension, low flow alarms, and pulmonary infiltrate with hypoxia requiring intubation from probable aspiration PNA. He was unable to extubated and has since undergone tracheostomy but tolerating persistent trach collar and nearing ability for decannulation. 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 Short term goal is maintenance of adequate nutrition and eventual trach decannulation.     Underwent mesenteric angiogram on 9/10 with vascular surgery. Found to have SMA stenosis at its origin but unable to place stent. Intra-op received 3uPRBC and required fem stop placement for L pseudoaneurysm. Post-op continued to have drop in H/H requiring additional 2u PRBC (last on 9/12), CT angio revealed left-sided retroperitoneal hematoma. Most recent sputum culture is positive for serratia marcescens, s/p course of meropenum. He remains frail and deconditioned, will continue to work with PT and advance tube feeds as tolerated.

## 2021-09-23 NOTE — SWALLOW BEDSIDE ASSESSMENT ADULT - SWALLOW EVAL: RECOMMENDED DIET
Continue NPO, with non-oral nutrition/hydration/medications.
Continue NPO, with non-oral nutrition/hydration/medications. Allow small amount of ice chips after oral care.

## 2021-09-23 NOTE — SWALLOW BEDSIDE ASSESSMENT ADULT - SWALLOW EVAL: DIAGNOSIS
Attempted to see pt for repeat swallow evaluation and PMV trial. Chart reviewed. Pt currently off unit in OR.  Will reattempt tomorrow.
67 yo male w/ h/o ACC/AHA stage D HF due to NICM HM2 LVAD, TV annuloplasty ring 9/12/17 due to severe PAD with significant stenosis, CKD3, admitted for melena w/ anemia, with complicated course notable for resp failure requiring trach, currently presents with respiratory status that does not support oral intake at this time. Given multiple risk factors for dysphagia, will eventually need instrumental swallow study for comprehensive evaluation of swallow function, prior to initiation of oral diet.
65 yo male w/ h/o ACC/AHA stage D HF due to NICM HM2 LVAD, TV annuloplasty ring 9/12/17 due to severe PAD with significant stenosis, CKD3, admitted for melena w/ anemia, with complicated course notable for resp failure requiring trach. S/p decannulation on 9/23. Patient presents with an oropharyngeal dysphagia characterized by delayed oral transit time, suspected premature spillage, delayed trigger of the swallow, and delayed cough with increased RR post swallow with 1/2 tsp thin liquids.

## 2021-09-23 NOTE — PROGRESS NOTE ADULT - PROBLEM SELECTOR PLAN 3
- Chronic in nature with questionable SMA stenosis on imaging and mesenteric duplex difficult to interpret with continuous flow. Underwent angiogram on 9/11 which showed SMA stenosis. Unable to place stent. Would hold off on repeating angiogram at this time.   - Continue to advance feeds as tolerates, goal rate 65 cc/hr  - Will need to discuss with GI about possibly undergoing PEGJ tube placed  - Discussed cholecystostomy with general surgery, unlikely to derive symptomatic benefit since perc dawn tube continues to drain. Repeat RUQ shows contracted gallbladder. Per general surgery holding off on removal of drain at this time.

## 2021-09-23 NOTE — SWALLOW BEDSIDE ASSESSMENT ADULT - SPECIFY REASON(S)
to subjectively assess swallow safety/function; r/o dysphagia.
to subjectively assess swallow safety/function; r/o dysphagia.

## 2021-09-23 NOTE — SWALLOW BEDSIDE ASSESSMENT ADULT - SWALLOW EVAL: SECRETION MANAGEMENT
+mild amount of secretions at stoma site; appeared to be managing well otherwise
requires frequent suction, per RN/problems swallowing secretions

## 2021-09-23 NOTE — SWALLOW BEDSIDE ASSESSMENT ADULT - H & P REVIEW
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 coli UTIs, driveline drainage (1/7/21) and COVID-19 (April 2020). Seen in clinic for c/o abdominal pain and dark stools w constipation in May. He presents to [facility] ER 6/14 w/ weakness, fatigue, and + Black stools x3 days, warfarin use in setting of  LVAD. Pt has required transfusions for GIB in past. Most recently in jan 2021 pt had anemia with dark stools. No interventions at that time. Last Endoscopy done in July 2020 (negative)./yes
yes

## 2021-09-23 NOTE — PROGRESS NOTE ADULT - PROBLEM SELECTOR PLAN 4
- ID following and appreciate recommendations   - serratia bacteremia and poss acalculous cholecystitis. B/c with gram + cocci and many enterobacter Carbapenem resistant strain and now s/p per dawn drain  - also with enterobacter from sputum culture 8/13  - sputum cx 9/14 positive for serratia marcescens  - Blood Cultures negative  - s/p course of Meropenum  - WBC slowly up-trending, however remains afebrile. Continue to monitor at this time. Low threshold to repeat cultures or to scan patient.

## 2021-09-23 NOTE — PROGRESS NOTE ADULT - ASSESSMENT
Met with Mr. Quispe who was seen resting in bed. A&Ox3. Presented with smile as he pointed to Trach decannulation site. Was seen by speech and swallow who recommend continuing NPO with small amount ice chips after oral care. Mood "so-so." Denies hopelessness. Denies SI/HI. Denies symptoms of anxiety. Receptive to going outside with staff when possible. Sleep impacted by environmental factors. Takes Remeron at bedtime. Continues to experience discomfort/pain in his lower abdomen. Continues to work with PT. Has been ambulating with walker at very good pace. Receptive to support, validation.     Dx: Adjustment disorder with anxiety and depressed mood. F43.23 Systolic heart failure I50.2  LVAD Z95.811    Recommendations:   Whenever possible, provide change of environment (going outside, sitting by window) to foster positive mood   To promote sleep, keep lights and noise level to minimum during night  Engage with patient as often as possible throughout day to encourage communication  Behavioral Cardiology will follow      20 minutes spent on total patient encounter    Jessica Hennessy, PhD  [phone]

## 2021-09-23 NOTE — SWALLOW BEDSIDE ASSESSMENT ADULT - PHARYNGEAL PHASE
Delayed pharyngeal swallow increased RR - quickly resolved/Delayed pharyngeal swallow/Delayed cough post oral intake

## 2021-09-23 NOTE — PROGRESS NOTE ADULT - PROBLEM SELECTOR PLAN 2
- Continue current speed of 9200 RPM. Speed increased this admission due to signs of inadequately unloaded left ventricle  - MAP is at goal at this time  - Holding coumadin and aspirin indefinitely given recurrent GI bleeding.   - LDH level remains elevated, currently 400. Remains off AC as stated above. Please check plasma free hemoglobin level.

## 2021-09-23 NOTE — PROGRESS NOTE ADULT - SUBJECTIVE AND OBJECTIVE BOX
Behavioral Cardiology Progress Note     History of present illness: Mr. Quispe is a 66 year-old man with history of NICM s/p HM2 on 9/2017 as DT (due to severe PAD) with TV ring, prior COVID-19 infection 4/2020, recurrent syncope post LVAD s/p ILR, and chronic abdominal pain with prior negative workup who was admitted with symptomatic anemia with Hgb 4.5 in setting of INR 8.8 without hemodynamic instability. Testing showed active bleeding in the small bowel but subsequent enteroscopy 6/15 did not reveal any active bleeding. He acutely decompensated after procedure with fever/hypertension, low flow alarms, and pulmonary infiltrate with hypoxia requiring intubation from probable aspiration PNA.  Course notable for inability to wean ventilator with persistent secretions for which he underwent tracheostomy as well as acute c diff colitis.     Social history: , lives in his sister’s house in [place]. Has 3 sons (2 live in , 1 in [facility] [place]). One of 3 siblings. Lives in his sister’s house in [place] (Cristina Rudolph [phone]), also in the home are niece ([facility] [phone]) and nephew (Miller Rudolph).  Another sister lives close by in [place] and all other siblings live in [place] which the family visit often.  Worked full time at [employer] in [place], stopped working due to heart disease. Education: high school graduate.     Substance use:   Tobacco: Former smoker, 8-10 cigarettes/day for 30 years.   Alcohol: Past use of 3-4 drinks of Henessey 2x/week. None for past 4 years.   Drug: Denies any drug use.     Past psychiatric history: Denies     Mental status exam: Seen resting in bed. Decannulated earlier today. Communicating with soft voice. Awake, alert, oriented x3. Thought process goal oriented. No evidence of any psychosis, kenan, delusions. No SI/HI. Mood "so-so" but very pleased about Trach decannulation. Affect less constricted. Denies SI/HI. Insight and judgment adequate.

## 2021-09-23 NOTE — PROGRESS NOTE ADULT - SUBJECTIVE AND OBJECTIVE BOX
RICKY HAMPTON  MRN-[id number]  Patient is a 66y old  Male who presents with a chief complaint of Anemia, Supratherapeutic INR, Dark Stools (22 Sep 2021 12:58)    HPI:  65M PMH ACC/AHA stage D HF due to NICM HM2 LVAD , TV annuloplasty ring 17 as destination therapy due to severe peripheral artery disease with significant stenosis  SIADH, Depression, CKD-3 with hyperkalemia, past E. coli UTIs, driveline drainage (21) and COVID-19 (back in 2020)  He was recently seen in clinic where he complained of abdominal pain and dark stools w constipation back in May. He presents to [facility] ER today weakness and fatigue, moderate and + Black stools for three days, on coumadin secondary to warfarin use in the setting of an LVAD. Patient has required transfusions for GIB in the past. Mostly recently back in 2021 pt had anemia with dark stools. No interventions was done at that time. However Last Endoscopy was done in 2020 (negative). Today labs show patient is anemic with H/H of 4.5/16.3,. INR is 8.84 MAP in the 90s, Temp 35.1. He denies any chest pain, shortness of breath, dizziness, abd pain, nausea or vomiting.       (2021 16:57)      Surgery/Hospital Course:   admit for melena w/ anemia, INR 8.84   6/15 Capsul study (+) for small bowel bleed, balloon endoscopy (old blood in prox ileum); post EGD - septic w/ L opacity, re-intubated for concern for aspiration, TTE (Mod MR, decrease biV w/ interventricular septum boweing towards R)   bronch    +C Diff    CT C/A/P: Fluid filled colon which may be 2/2 rapid transit. Small bilateral pleffs with associates. Compressive atelectasis New ISABELLE & LLL  parenchymal opacities, suspicious for pneumonia. 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    #8 Shiley trach at bedside    LVAD speed increased to 9200   Bronch   TC since . Patient transferred to SDU.    INR today 2.64.  H&H 7.3/24 this AM.  Will repeat CBC at noon, and will send stool guaiac Patient with persistent abdominal tenderness, rate of tube feeds decreased.  No nausea/vomiting.     INR today 2.4. H&H 9.1/28.6 low flow overnight /N&V, refusing Tube feeds on D5 1/2 normal  @50 cc/hr   INR 2.69  H&H 7.7/.1 refusing Tube feeds on D5 1/2 normal  @50 cc/hr. This am + BM Melena Dr Oneill HF  aware- PRBC x1  GI team consulted -  NPO  plan on study in am-  D/w Dr Cadet Patient  to return to CTU for further management; 1PRBCS    Post op INR 2.2 today.  No bleeding. BC + for SM.  Pt is hypotensive requiring pressor and inotropic support.  ID follow up today on Cefepime will follow.   R PTC for PTX    CT C/A/P: sub q emphysema in R chest wall, GGO RUL, small ascites CTH negative; Abd US: GB thickening, pericholecystic fluid     Perchole drain in place continues to drain total output overnight 133.  Fever today 38.8    duplex LE negative    Patient with persistent abdominal pain, refusing tube feeds and medications, Psych consulted   CTA A/P ordered to r/o mesenteric ischemia 2/2 persistent anorexia, nausea, vomiting. Revealed:  Evaluation of the mesenteric vessels is limited by streak artifact from LVAD. There appears to be severe stenosis of the proximal SMA; abdominal mesenteric doppler is recommended for further evaluation. 2.  No small bowel findings to suggest acute mesenteric ischemia. 3.  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  8/15: Cultures resulted BC 1/2 +GPC in clusters, SC enterobacter; mesenteric duplex: borderline stenosis of proximal SMA  : CT C/A/P noncon: Nondular opacities in R lung apex w/cavitation, abd nl  :  Continue current care, treatment of thyrotoxicosis with medications as per endocrine, d/c ABX as per team.    RUQ sono: Contracted gallbladder with cholecystostomy tube in place.  9/10 failed SMA stent, L fem PSA, s/p 3U PRCB   CTA Abd/Pelvis L RP hematoma     Today:    REVIEW OF SYSTEMS:  Unable to obtain as patient has trach    ICU Vital Signs Last 24 Hrs  T(C): 36.1 (23 Sep 2021 08:00), Max: 36.4 (23 Sep 2021 00:00)  T(F): 96.9 (23 Sep 2021 08:00), Max: 97.5 (23 Sep 2021 00:00)  HR: 88 (23 Sep 2021 09:00) (84 - 103)  BP: --  BP(mean): --  ABP: 81/66 (23 Sep 2021 09:00) (73/59 - 110/73)  ABP(mean): 73 (23 Sep 2021 09:00) (66 - 88)  RR: 26 (23 Sep 2021 09:) (16 - 51)  SpO2: 100% (23 Sep 2021 09:00) (100% - 100%)      Physical Exam:  Gen:  Awake, alert, NAD  CNS:  intact, nonfocal, responds to all commands  Neck: no JVD, +TC   RES : course breath sounds, no wheezing, moderate tan secretions able to cough up    CVS: +LVAD hum   Abd: Soft. Sybil drain with bilious fluid. Positive BS throughout. Mild RUQ abdominal tenderness by perc sybil.  Skin: No rash, erythema, cyanosis.  Vasc: Warm and well-perfused.  Ext:  no edema    ============================I/O===========================   I&O's Detail    22 Sep 2021 07:01  -  23 Sep 2021 07:00  --------------------------------------------------------  IN:    Enteral Tube Flush: 110 mL    IV PiggyBack: 150 mL    Miscellaneous Tube Feedin mL    sodium chloride 0.9%: 120 mL  Total IN: 1585 mL    OUT:    Drain (mL): 450 mL    Voided (mL): 700 mL  Total OUT: 1150 mL    Total NET: 435 mL      23 Sep 2021 07:01  -  23 Sep 2021 09:43  --------------------------------------------------------  IN:    Miscellaneous Tube Feedin mL    sodium chloride 0.9%: 5 mL  Total IN: 60 mL    OUT:    Voided (mL): 200 mL  Total OUT: 200 mL    Total NET: -140 mL        ============================ LABS =========================                        10.7   11.78 )-----------( 384      ( 23 Sep 2021 01:15 )             32.9     -    131<L>  |  95<L>  |  16  ----------------------------<  148<H>  4.5   |  25  |  0.43<L>    Ca    9.6      23 Sep 2021 01:15  Phos  3.5       Mg     1.9         TPro  8.6<H>  /  Alb  3.1<L>  /  TBili  0.7  /  DBili  x   /  AST  34  /  ALT  35  /  AlkPhos  262<H>      LIVER FUNCTIONS - ( 23 Sep 2021 01:15 )  Alb: 3.1 g/dL / Pro: 8.6 g/dL / ALK PHOS: 262 U/L / ALT: 35 U/L / AST: 34 U/L / GGT: x             ABG - ( 23 Sep 2021 01:11 )  pH, Arterial: 7.40  pH, Blood: x     /  pCO2: 52    /  pO2: 164   / HCO3: 32    / Base Excess: 6.3   /  SaO2: 99.3                ======================Micro/Rad/Cardio=================  Culture: Reviewed   CXR: Reviewed  Echo:Reviewed  ======================================================  PAST MEDICAL & SURGICAL HISTORY:  CHF (congestive heart failure)    CAD (coronary artery disease)    Depression    Pleural effusion    History of  novel coronavirus disease (COVID-19)  2020    Hemorrhoids    Bleeding hemorrhoids    Peripheral arterial disease    Claudication    BPH with urinary obstruction    ACC/AHA stage D systolic heart failure    Anticoagulation goal of INR 2.0 to 2.5    Falls    Clavicle fracture    CKD (chronic kidney disease), stage III    Iron deficiency anemia    H/O epistaxis    Vertigo    GI bleed    S/P TVR (tricuspid valve repair)    S/P ventricular assist device    S/P endoscopy      ====================ASSESSMENT ==============  Stage D Nonischemic Cardiomyopathy, Status Post HM2 on 2017    Cardiogenic shock  Hemodynamic instability   Acute hypoxemic respiratory failure s/p trach   GI bleed , Status Post Enteroscopy   Anemia, in setting of melena   Chronic Kidney Disease  Stress hyperglycemia   C.diff positive on    Hypovolemic shock  Septic shock  Leukocytosis  GB thickening/percholecystic s/p perc choley by IT   SMA stenosis  Serratia/citrobacter pneumonia   Stenotrophomonas pneumonia   Enterobacter pneumonia   Nasuea/vomiting  Deconditioning      Plan:  ====================== NEUROLOGY=====================  Nonfocal, continue to monitor neuro status    Continue ambulation as tolerated   Tylenol PRN for analgesia   C/w mirtazapine and sertraline for depression  Deconditioned, PT/Ambulate as tolerated    acetaminophen   Tablet .. 650 milliGRAM(s) Oral every 6 hours PRN Mild Pain (1 - 3)  mirtazapine 7.5 milliGRAM(s) Oral daily  sertraline 100 milliGRAM(s) Oral daily    ==================== RESPIRATORY======================  Acute hypoxemic respiratory failure s/p #8 shiley trach on , continue TC as tolerated (TC since )  Continue close monitoring of respiratory rate and breathing pattern, following of ABG’s with A-line monitoring, continuous pulse oximetry monitoring.   Moderate secretions, continue suctioning prn, pulmonary toileting.       ====================CARDIOVASCULAR==================  Stage D Nonischemic Cardiomyopathy, Status Post HM2 on 2017; LVAD settings 9200, flow 5.7  TTE : reveals at least moderate MR, mild AI, severe global LV syst dysfxn, dec RV fxn   Propranolol for rate control of HR 2/2 Hyperthyroidism, titrate as tolerated per Endo  Continue invasive hemodynamic monitoring     propranolol 40 milliGRAM(s) Oral every 8 hours    ===================HEMATOLOGIC/ONC ===================  CTA A/P on  +L RP hematoma   Acute blood loss anemia, monitor H&H/Plts    Holding coumadin and ASA given recurrent GI bleeding    ===================== RENAL =========================  Continue monitoring urine output, I&OS, BUN/Cr   Euvolemic, even fluid balance, currently off diuretics.    Replete lytes PRN. Keep K> 4 and Mg >2     ==================== GASTROINTESTINAL===================  S/p cholecystostomy tube placed on  with IR  Tolerating TFs @ 55cc/hr, increase 5cc/day for goal 65cc/hr as tolerated   CTA A/P : Evaluation of the mesenteric vessels is limited by streak artifact from LVAD. There appears to be severe stenosis of the proximal SMA; abdominal mesenteric doppler is recommended for further evaluation. 2.  No small bowel findings to suggest acute mesenteric ischemia. 3.  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  Mesenteric duplex on 8/15 borderline stenosis of proximal SMA, s/p failed SMA stent, L fem RP hematoma on 9/10, s/p 3U PRBC.   Reglan for gut motility  Zofran PRN nausea/vomiting  Pt would likely benefit from a PEJ given persistent TF intolerance, N/V, however is refusing   Pending general surgery (Dr. Azar) recommendations re: potential planning for PEJ placement, cholecystostomy removal, cholecystectomy     multivitamin 1 Tablet(s) Oral daily  GI prophylaxis, pantoprazole  Injectable 40 milliGRAM(s) IV Push every 12 hours  simethicone 80 milliGRAM(s) Chew every 8 hours PRN Gas  metoclopramide Injectable 10 milliGRAM(s) IV Push every 8 hours  ondansetron Injectable 4 milliGRAM(s) IV Push every 6 hours PRN Nausea and/or Vomiting  sodium chloride 0.9% lock flush 10 milliLiter(s) IV Push every 1 hour PRN Pre/post blood products, medications, blood draw, and to maintain line patency  sodium chloride 0.9%. 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 - Free T4 remains elevated   Per endocrine      - Thyroid US when trach is out      - Propanolol as above for optimal T4-->T3 conversion - titrate as tolerated       - c/w Methimazole ---> changed to 10mg Q8 yesterday       - s/p hydrocortisone    dextrose 40% Gel 15 Gram(s) Oral once  dextrose 50% Injectable 25 Gram(s) IV Push once  dextrose 50% Injectable 12.5 Gram(s) IV Push once  glucagon  Injectable 1 milliGRAM(s) IntraMuscular once  insulin lispro (ADMELOG) corrective regimen sliding scale   SubCutaneous every 6 hours  methimazole 10 milliGRAM(s) Oral every 8 hours    ========================INFECTIOUS DISEASE================  Afebrile, WBC rising 9.66->11.78   Continue trending WBC and monitoring fever curve        Patient requires continuous monitoring with bedside rhythm monitoring, pulse ox monitoring, and intermittent blood gas analysis. Care plan discussed with ICU care team. Patient remained critical and at risk for life threatening decompensation.     By signing my name below, I, Agnieszka Cherry, attest that this documentation has been prepared under the direction and in the presence of CLAUDIA Goldstein   Electronically signed: Cesia Shaffer, 21 @ 09:43    I, Cristóbal Cisneros, personally performed the services described in this documentation. all medical record entries made by the luisibmel were at my direction and in my presence. I have reviewed the chart and agree that the record reflects my personal performance and is accurate and complete  Electronically signed: CLAUDIA Goldstein        RICKY HAMPTON  MRN-[id number]  Patient is a 66y old  Male who presents with a chief complaint of Anemia, Supratherapeutic INR, Dark Stools (22 Sep 2021 12:58)    HPI:  65M PMH ACC/AHA stage D HF due to NICM HM2 LVAD , TV annuloplasty ring 17 as destination therapy due to severe peripheral artery disease with significant stenosis  SIADH, Depression, CKD-3 with hyperkalemia, past E. coli UTIs, driveline drainage (21) and COVID-19 (back in 2020)  He was recently seen in clinic where he complained of abdominal pain and dark stools w constipation back in May. He presents to [facility] ER today weakness and fatigue, moderate and + Black stools for three days, on coumadin secondary to warfarin use in the setting of an LVAD. Patient has required transfusions for GIB in the past. Mostly recently back in 2021 pt had anemia with dark stools. No interventions was done at that time. However Last Endoscopy was done in 2020 (negative). Today labs show patient is anemic with H/H of 4.5/16.3,. INR is 8.84 MAP in the 90s, Temp 35.1. He denies any chest pain, shortness of breath, dizziness, abd pain, nausea or vomiting.       (2021 16:57)      Surgery/Hospital Course:   admit for melena w/ anemia, INR 8.84   6/15 Capsul study (+) for small bowel bleed, balloon endoscopy (old blood in prox ileum); post EGD - septic w/ L opacity, re-intubated for concern for aspiration, TTE (Mod MR, decrease biV w/ interventricular septum boweing towards R)   bronch    +C Diff    CT C/A/P: Fluid filled colon which may be 2/2 rapid transit. Small bilateral pleffs with associates. Compressive atelectasis New ISABELLE & LLL  parenchymal opacities, suspicious for pneumonia. 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    #8 Shiley trach at bedside    LVAD speed increased to 9200   Bronch   TC since . Patient transferred to SDU.    INR today 2.64.  H&H 7.3/24 this AM.  Will repeat CBC at noon, and will send stool guaiac Patient with persistent abdominal tenderness, rate of tube feeds decreased.  No nausea/vomiting.     INR today 2.4. H&H 9.1/28.6 low flow overnight /N&V, refusing Tube feeds on D5 1/2 normal  @50 cc/hr   INR 2.69  H&H 7.7/.1 refusing Tube feeds on D5 1/2 normal  @50 cc/hr. This am + BM Melena Dr Oneill HF  aware- PRBC x1  GI team consulted -  NPO  plan on study in am-  D/w Dr Cadet Patient  to return to CTU for further management; 1PRBCS    Post op INR 2.2 today.  No bleeding. BC + for SM.  Pt is hypotensive requiring pressor and inotropic support.  ID follow up today on Cefepime will follow.   R PTC for PTX    CT C/A/P: sub q emphysema in R chest wall, GGO RUL, small ascites CTH negative; Abd US: GB thickening, pericholecystic fluid     Perchole drain in place continues to drain total output overnight 133.  Fever today 38.8    duplex LE negative    Patient with persistent abdominal pain, refusing tube feeds and medications, Psych consulted   CTA A/P ordered to r/o mesenteric ischemia 2/2 persistent anorexia, nausea, vomiting. Revealed:  Evaluation of the mesenteric vessels is limited by streak artifact from LVAD. There appears to be severe stenosis of the proximal SMA; abdominal mesenteric doppler is recommended for further evaluation. 2.  No small bowel findings to suggest acute mesenteric ischemia. 3.  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  8/15: Cultures resulted BC 1/2 +GPC in clusters, SC enterobacter; mesenteric duplex: borderline stenosis of proximal SMA  : CT C/A/P noncon: Nondular opacities in R lung apex w/cavitation, abd nl  :  Continue current care, treatment of thyrotoxicosis with medications as per endocrine, d/c ABX as per team.    RUQ sono: Contracted gallbladder with cholecystostomy tube in place.  9/10 failed SMA stent, L fem PSA, s/p 3U PRCB   CTA Abd/Pelvis L RP hematoma     Today:  - Tracheostomy decannulated @ 10AM, ABGs stable on 2L NC, clinically looks great, no respiratory distress, phonating well  - Tube feeds increased to 55cc/hr, tolerating, no vomiting, increasing tube feeds by 5cc/day  - S&S evaluated @ bedside, has some coughing and tachypnea with liquids, S&S suggested ice chips PRN for now and may re-evaluate tomorrow, possibly FEES on Monday  - SQH initiated for DVT ppx, Hct stable  - LUE midline inserted, central line d/c'd  - No further vascular interventions per Dr. Cadet  - No acute surgical intervention on cholecystostomy tube per Dr. Azar (general surgery)  - Patient adamantly refusing PEJ tube    REVIEW OF SYSTEMS:  Unable to obtain as patient has trach    ICU Vital Signs Last 24 Hrs  T(C): 36.1 (23 Sep 2021 08:00), Max: 36.4 (23 Sep 2021 00:00)  T(F): 96.9 (23 Sep 2021 08:00), Max: 97.5 (23 Sep 2021 00:00)  HR: 88 (23 Sep 2021 09:00) (84 - 103)  BP: --  BP(mean): --  ABP: 81/66 (23 Sep 2021 09:00) (73/59 - 110/73)  ABP(mean): 73 (23 Sep 2021 09:00) (66 - 88)  RR: 26 (23 Sep 2021 09:00) (16 - 51)  SpO2: 100% (23 Sep 2021 09:00) (100% - 100%)      Physical Exam:  Gen:  Awake, alert, NAD  CNS:  intact, nonfocal, responds to all commands  Neck: no JVD, gauze over trach stoma  RES : course breath sounds, no wheezing    CVS: +LVAD hum   Abd: Soft. Sybil drain with bilious fluid. Positive BS throughout. Mild RUQ abdominal tenderness by perc sybil.  Skin: No rash, erythema, cyanosis.  Vasc: Warm and well-perfused.  Ext:  no edema    ============================I/O===========================   I&O's Detail    22 Sep 2021 07:01  -  23 Sep 2021 07:00  --------------------------------------------------------  IN:    Enteral Tube Flush: 110 mL    IV PiggyBack: 150 mL    Miscellaneous Tube Feedin mL    sodium chloride 0.9%: 120 mL  Total IN: 1585 mL    OUT:    Drain (mL): 450 mL    Voided (mL): 700 mL  Total OUT: 1150 mL    Total NET: 435 mL      23 Sep 2021 07:  -  23 Sep 2021 09:43  --------------------------------------------------------  IN:    Miscellaneous Tube Feedin mL    sodium chloride 0.9%: 5 mL  Total IN: 60 mL    OUT:    Voided (mL): 200 mL  Total OUT: 200 mL    Total NET: -140 mL        ============================ LABS =========================                        10.7   11.78 )-----------( 384      ( 23 Sep 2021 01:15 )             32.9         131<L>  |  95<L>  |  16  ----------------------------<  148<H>  4.5   |  25  |  0.43<L>    Ca    9.6      23 Sep 2021 01:15  Phos  3.5       Mg     1.9         TPro  8.6<H>  /  Alb  3.1<L>  /  TBili  0.7  /  DBili  x   /  AST  34  /  ALT  35  /  AlkPhos  262<H>      LIVER FUNCTIONS - ( 23 Sep 2021 01:15 )  Alb: 3.1 g/dL / Pro: 8.6 g/dL / ALK PHOS: 262 U/L / ALT: 35 U/L / AST: 34 U/L / GGT: x             ABG - ( 23 Sep 2021 01:11 )  pH, Arterial: 7.40  pH, Blood: x     /  pCO2: 52    /  pO2: 164   / HCO3: 32    / Base Excess: 6.3   /  SaO2: 99.3                ======================Micro/Rad/Cardio=================  Culture: Reviewed   CXR: Reviewed  Echo:Reviewed  ======================================================  PAST MEDICAL & SURGICAL HISTORY:  CHF (congestive heart failure)    CAD (coronary artery disease)    Depression    Pleural effusion    History of  novel coronavirus disease (COVID-19)  2020    Hemorrhoids    Bleeding hemorrhoids    Peripheral arterial disease    Claudication    BPH with urinary obstruction    ACC/AHA stage D systolic heart failure    Anticoagulation goal of INR 2.0 to 2.5    Falls    Clavicle fracture    CKD (chronic kidney disease), stage III    Iron deficiency anemia    H/O epistaxis    Vertigo    GI bleed    S/P TVR (tricuspid valve repair)    S/P ventricular assist device    S/P endoscopy      ====================ASSESSMENT ==============  Stage D Nonischemic Cardiomyopathy, Status Post HM2 on 2017    Cardiogenic shock  Hemodynamic instability   Acute hypoxemic respiratory failure s/p trach   GI bleed , Status Post Enteroscopy   Anemia, in setting of melena   Chronic Kidney Disease  Stress hyperglycemia   C.diff positive on    Hypovolemic shock  Septic shock  Leukocytosis  GB thickening/percholecystic s/p perc choley by IT   SMA stenosis  Serratia/citrobacter pneumonia   Stenotrophomonas pneumonia   Enterobacter pneumonia   Nasuea/vomiting  Deconditioning      Plan:  ====================== NEUROLOGY=====================  Nonfocal, continue to monitor neuro status    Continue ambulation as tolerated   Tylenol PRN for analgesia   C/w mirtazapine and sertraline for depression  Deconditioned, PT/Ambulate as tolerated    acetaminophen   Tablet .. 650 milliGRAM(s) Oral every 6 hours PRN Mild Pain (1 - 3)  mirtazapine 7.5 milliGRAM(s) Oral daily  sertraline 100 milliGRAM(s) Oral daily    ==================== RESPIRATORY======================  Acute hypoxemic respiratory failure s/p #8 shiley trach on   Tracheostomy decannulated  without issue, on 2L NC, monitor  Continue close monitoring of respiratory rate and breathing pattern, following of ABG’s with A-line monitoring, continuous pulse oximetry monitoring.     ====================CARDIOVASCULAR==================  Stage D Nonischemic Cardiomyopathy, Status Post HM2 on 2017; LVAD settings 9200, flow 5.7  TTE : reveals at least moderate MR, mild AI, severe global LV syst dysfxn, dec RV fxn   Propranolol for rate control of HR 2/2 Hyperthyroidism, titrate as tolerated per Endo  Continue invasive hemodynamic monitoring     propranolol 40 milliGRAM(s) Oral every 8 hours    ===================HEMATOLOGIC/ONC ===================  CTA A/P on  +L RP hematoma   Acute blood loss anemia, monitor H&H/Plts    Holding coumadin and ASA given recurrent GI bleeding  SQH restarted for DVT ppx    ===================== RENAL =========================  Continue monitoring urine output, I&OS, BUN/Cr   Euvolemic, even fluid balance, currently off diuretics.    Replete lytes PRN. Keep K> 4 and Mg >2     ==================== GASTROINTESTINAL===================  S/p cholecystostomy tube placed on  with IR  Tolerating TFs @ 55cc/hr, increase 5cc/day for goal 65cc/hr as tolerated   CTA A/P : Evaluation of the mesenteric vessels is limited by streak artifact from LVAD. There appears to be severe stenosis of the proximal SMA; abdominal mesenteric doppler is recommended for further evaluation. 2.  No small bowel findings to suggest acute mesenteric ischemia. 3.  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  Mesenteric duplex on 8/15 borderline stenosis of proximal SMA, s/p failed SMA stent, L fem RP hematoma on 9/10, s/p 3U PRBC.   Reglan for gut motility  Zofran PRN nausea/vomiting  No intervention for cholecystostomy tube, PEJ  S&S eval, ice chips PRN, will re-evaluate, poss FEES Monday      multivitamin 1 Tablet(s) Oral daily  GI prophylaxis, pantoprazole  Injectable 40 milliGRAM(s) IV Push every 12 hours  simethicone 80 milliGRAM(s) Chew every 8 hours PRN Gas  metoclopramide Injectable 10 milliGRAM(s) IV Push every 8 hours  ondansetron Injectable 4 milliGRAM(s) IV Push every 6 hours PRN Nausea and/or Vomiting  sodium chloride 0.9% lock flush 10 milliLiter(s) IV Push every 1 hour PRN Pre/post blood products, medications, blood draw, and to maintain line patency  sodium chloride 0.9%. 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 - Free T4 remains elevated   Per endocrine      - Thyroid US when trach is out      - Propanolol as above for optimal T4-->T3 conversion - titrate as tolerated       - c/w Methimazole ---> changed to 10mg Q8 yesterday       - s/p hydrocortisone    dextrose 40% Gel 15 Gram(s) Oral once  dextrose 50% Injectable 25 Gram(s) IV Push once  dextrose 50% Injectable 12.5 Gram(s) IV Push once  glucagon  Injectable 1 milliGRAM(s) IntraMuscular once  insulin lispro (ADMELOG) corrective regimen sliding scale   SubCutaneous every 6 hours  methimazole 10 milliGRAM(s) Oral every 8 hours    ========================INFECTIOUS DISEASE================  Afebrile, WBC rising 9.66->11.78   Continue trending WBC and monitoring fever curve  New LUE Midline palced  RIJ central line removed        Patient requires continuous monitoring with bedside rhythm monitoring, pulse ox monitoring, and intermittent blood gas analysis. Care plan discussed with ICU care team. Patient remained critical and at risk for life threatening decompensation.     By signing my name below, I, Agnieszka Cherry, attest that this documentation has been prepared under the direction and in the presence of CLAUDIA Goldstein   Electronically signed: Cesia Shaffer, 21 @ 09:43    I, Cristóbal Cisneros, personally performed the services described in this documentation. all medical record entries made by the luisibmel were at my direction and in my presence. I have reviewed the chart and agree that the record reflects my personal performance and is accurate and complete  Electronically signed: CLAUDIA Goldstein

## 2021-09-23 NOTE — SWALLOW BEDSIDE ASSESSMENT ADULT - SLP PERTINENT HISTORY OF CURRENT PROBLEM
Sx/Hosp Course: 6/14 admit for melena w/ anemia, INR 8.84; 6/15 Capsule study (+) for small bowel bleed, balloon endoscopy (old blood in prox ileum); post EGD - septic w/ L opacity, re-intubated for concern for aspiration, TTE (Mod MR, decrease biV w/ interventricular septum boweing towards R); 6/20 +C Diff; 6/22 CT C/A/P: Fluid filled colon which may be 2/2 rapid transit. Small b/l pl effs with associated compressive atelectasis New ISABELLE & LLL  parenchymal opacities, suspicious for PNA. 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 Pt transferred to SDU; 7/23 INR today 2.64.  H&H 7.3/24 this AM.  Will repeat CBC, will send stool guaiac. Persistent abdominal tenderness, rate of tube feeds decreased.  No N/V.

## 2021-09-23 NOTE — PROGRESS NOTE ADULT - SUBJECTIVE AND OBJECTIVE BOX
Contact info:   Dimitrios Reinoso MD  pager [phone], please provide 10 digit call back number.   Please note that this patient may be followed by another provider tomorrow.   If no answer or after hours, please contact [phone]    Interval History/Subjective: There is no acute event overnight.      MEDICATIONS  (STANDING):  chlorhexidine 0.12% Liquid 15 milliLiter(s) Oral Mucosa two times a day  chlorhexidine 2% Cloths 1 Application(s) Topical <User Schedule>  dextrose 40% Gel 15 Gram(s) Oral once  dextrose 50% Injectable 25 Gram(s) IV Push once  dextrose 50% Injectable 12.5 Gram(s) IV Push once  glucagon  Injectable 1 milliGRAM(s) IntraMuscular once  heparin   Injectable 5000 Unit(s) SubCutaneous every 8 hours  insulin lispro (ADMELOG) corrective regimen sliding scale   SubCutaneous every 6 hours  lidocaine   4% Patch 1 Patch Transdermal daily  methimazole 10 milliGRAM(s) Oral every 8 hours  metoclopramide Injectable 10 milliGRAM(s) IV Push every 8 hours  mirtazapine 7.5 milliGRAM(s) Oral daily  multivitamin 1 Tablet(s) Oral daily  pantoprazole  Injectable 40 milliGRAM(s) IV Push every 12 hours  propranolol 40 milliGRAM(s) Oral every 8 hours  sertraline 100 milliGRAM(s) Oral daily  sodium chloride 0.9%. 1000 milliLiter(s) (10 mL/Hr) IV Continuous <Continuous>  thiamine 100 milliGRAM(s) Oral daily    MEDICATIONS  (PRN):  acetaminophen   Tablet .. 650 milliGRAM(s) Oral every 6 hours PRN Mild Pain (1 - 3)  ondansetron Injectable 4 milliGRAM(s) IV Push every 6 hours PRN Nausea and/or Vomiting  simethicone 80 milliGRAM(s) Chew every 8 hours PRN Gas  sodium chloride 0.9% lock flush 10 milliLiter(s) IV Push every 1 hour PRN Pre/post blood products, medications, blood draw, and to maintain line patency      Allergies    Amiodarone Hydrochloride (Other (Severe))    Intolerances      Review of Systems:  Constitutional: No fever  Eyes: No blurry vision  Neuro: No tremors  HEENT: No pain  Cardiovascular: No chest pain, palpitations  Respiratory: No SOB, no cough  GI: No nausea, vomiting, abdominal pain  : No dysuria  Skin: no rash  Psych: no depression  Endocrine: no polyuria, polydipsia  Hem/lymph: no swelling    ALL OTHER SYSTEMS REVIEWED AND NEGATIVE    PHYSICAL EXAM:  VITALS: T(C): 36.3 (09-23-21 @ 12:00)  T(F): 97.3 (09-23-21 @ 12:00), Max: 97.5 (09-23-21 @ 00:00)  HR: 105 (09-23-21 @ 14:00) (84 - 112)  BP: --  RR:  (16 - 51)  SpO2:  (99% - 100%)  Wt(kg): --  GENERAL: NAD, NGT in place  THYROID: trach in place, unable to palpate  RESPIRATORY: Clear to auscultation bilaterally  CARDIOVASCULAR: Regular rate and rhythm; No murmurs; no peripheral edema  GI: tenderness to palpation of abdomen  PSYCH: Alert and oriented x 3, flat affect    POCT Blood Glucose.: 145 mg/dL (09-23-21 @ 13:04)  POCT Blood Glucose.: 145 mg/dL (09-23-21 @ 06:11)  POCT Blood Glucose.: 119 mg/dL (09-22-21 @ 17:32)  POCT Blood Glucose.: 128 mg/dL (09-22-21 @ 11:12)  POCT Blood Glucose.: 123 mg/dL (09-22-21 @ 05:40)  POCT Blood Glucose.: 114 mg/dL (09-21-21 @ 23:05)  POCT Blood Glucose.: 118 mg/dL (09-21-21 @ 17:25)  POCT Blood Glucose.: 118 mg/dL (09-21-21 @ 11:24)  POCT Blood Glucose.: 123 mg/dL (09-21-21 @ 06:20)  POCT Blood Glucose.: 117 mg/dL (09-20-21 @ 22:57)  POCT Blood Glucose.: 113 mg/dL (09-20-21 @ 17:32)      09-23    131<L>  |  95<L>  |  16  ----------------------------<  148<H>  4.5   |  25  |  0.43<L>    EGFR if : 140  EGFR if non : 121    Ca    9.6      09-23  Mg     1.9     09-23  Phos  3.5     09-23    TPro  8.6<H>  /  Alb  3.1<L>  /  TBili  0.7  /  DBili  x   /  AST  34  /  ALT  35  /  AlkPhos  262<H>  09-23        Thyroid Function Tests:  09-21 @ 12:22 TSH -- FreeT4 4.9 T3 -- Anti TPO -- Anti Thyroglobulin Ab -- TSI --  09-20 @ 12:00 TSH <0.01 FreeT4 4.0 T3 133 Anti TPO -- Anti Thyroglobulin Ab -- TSI --

## 2021-09-23 NOTE — PROGRESS NOTE ADULT - SUBJECTIVE AND OBJECTIVE BOX
Subjective: Patient seen and examined resting in bed. ON no issues. WBC noted to bump today, off antibiotics     Medications:  acetaminophen   Tablet .. 650 milliGRAM(s) Oral every 6 hours PRN  chlorhexidine 0.12% Liquid 15 milliLiter(s) Oral Mucosa two times a day  chlorhexidine 2% Cloths 1 Application(s) Topical <User Schedule>  dextrose 40% Gel 15 Gram(s) Oral once  dextrose 50% Injectable 25 Gram(s) IV Push once  dextrose 50% Injectable 12.5 Gram(s) IV Push once  glucagon  Injectable 1 milliGRAM(s) IntraMuscular once  heparin   Injectable 5000 Unit(s) SubCutaneous every 8 hours  insulin lispro (ADMELOG) corrective regimen sliding scale   SubCutaneous every 6 hours  lidocaine   4% Patch 1 Patch Transdermal daily  methimazole 10 milliGRAM(s) Oral every 8 hours  metoclopramide Injectable 10 milliGRAM(s) IV Push every 8 hours  mirtazapine 7.5 milliGRAM(s) Oral daily  multivitamin 1 Tablet(s) Oral daily  ondansetron Injectable 4 milliGRAM(s) IV Push every 6 hours PRN  pantoprazole  Injectable 40 milliGRAM(s) IV Push every 12 hours  propranolol 40 milliGRAM(s) Oral every 8 hours  sertraline 100 milliGRAM(s) Oral daily  simethicone 80 milliGRAM(s) Chew every 8 hours PRN  sodium chloride 0.9% lock flush 10 milliLiter(s) IV Push every 1 hour PRN  sodium chloride 0.9%. 1000 milliLiter(s) IV Continuous <Continuous>  thiamine 100 milliGRAM(s) Oral daily    Vitals:  Vital Signs Last 24 Hours  T(C): 36.3 (09-23-21 @ 12:00), Max: 36.4 (09-23-21 @ 00:00)  HR: 105 (09-23-21 @ 14:00) (84 - 112)  BP: --  RR: 25 (09-23-21 @ 14:00) (16 - 51)  SpO2: 99% (09-23-21 @ 14:00) (99% - 100%)        I&O's Summary    22 Sep 2021 07:01  -  23 Sep 2021 07:00  --------------------------------------------------------  IN: 1585 mL / OUT: 1150 mL / NET: 435 mL    23 Sep 2021 07:01  -  23 Sep 2021 14:48  --------------------------------------------------------  IN: 540 mL / OUT: 450 mL / NET: 90 mL        Physical Exam  General: No distress. Comfortable.  HEENT: EOM intact.  Neck: Neck supple. JVP not elevated. No masses  Chest: Clear to auscultation bilaterally  CV: Normal S1 and S2. No murmurs, rub, or gallops. Radial pulses normal.  Abdomen: Soft, non-distended, non-tender  Skin: No rashes or skin breakdown  Neurology: Alert and oriented times three. Sensation intact  Psych: Affect normal    LVAD Interrogation  VAD TYPE HM 2  Speed 9200  Flow 5.7  Power 5.8  PI 5.8  Assessment of driveline exit site: driveline exit site c/d/i  Programming changes: no changes made    Labs:                        10.7   11.78 )-----------( 384      ( 23 Sep 2021 01:15 )             32.9     09-23    131<L>  |  95<L>  |  16  ----------------------------<  148<H>  4.5   |  25  |  0.43<L>    Ca    9.6      23 Sep 2021 01:15  Phos  3.5     09-23  Mg     1.9     09-23    TPro  8.6<H>  /  Alb  3.1<L>  /  TBili  0.7  /  DBili  x   /  AST  34  /  ALT  35  /  AlkPhos  262<H>  09-23              Lactate Dehydrogenase, Serum: 400 U/L (09-23 @ 01:15)  Lactate Dehydrogenase, Serum: 373 U/L (09-22 @ 01:05)  Lactate Dehydrogenase, Serum: 366 U/L (09-21 @ 00:24)     Subjective: Patient seen and examined resting in bed. ON no issues. WBC noted to bump today, off antibiotics     Medications:  acetaminophen   Tablet .. 650 milliGRAM(s) Oral every 6 hours PRN  chlorhexidine 0.12% Liquid 15 milliLiter(s) Oral Mucosa two times a day  chlorhexidine 2% Cloths 1 Application(s) Topical <User Schedule>  dextrose 40% Gel 15 Gram(s) Oral once  dextrose 50% Injectable 25 Gram(s) IV Push once  dextrose 50% Injectable 12.5 Gram(s) IV Push once  glucagon  Injectable 1 milliGRAM(s) IntraMuscular once  heparin   Injectable 5000 Unit(s) SubCutaneous every 8 hours  insulin lispro (ADMELOG) corrective regimen sliding scale   SubCutaneous every 6 hours  lidocaine   4% Patch 1 Patch Transdermal daily  methimazole 10 milliGRAM(s) Oral every 8 hours  metoclopramide Injectable 10 milliGRAM(s) IV Push every 8 hours  mirtazapine 7.5 milliGRAM(s) Oral daily  multivitamin 1 Tablet(s) Oral daily  ondansetron Injectable 4 milliGRAM(s) IV Push every 6 hours PRN  pantoprazole  Injectable 40 milliGRAM(s) IV Push every 12 hours  propranolol 40 milliGRAM(s) Oral every 8 hours  sertraline 100 milliGRAM(s) Oral daily  simethicone 80 milliGRAM(s) Chew every 8 hours PRN  sodium chloride 0.9% lock flush 10 milliLiter(s) IV Push every 1 hour PRN  sodium chloride 0.9%. 1000 milliLiter(s) IV Continuous <Continuous>  thiamine 100 milliGRAM(s) Oral daily    Vitals:  Vital Signs Last 24 Hours  T(C): 36.3 (09-23-21 @ 12:00), Max: 36.4 (09-23-21 @ 00:00)  HR: 105 (09-23-21 @ 14:00) (84 - 112)  BP: --  RR: 25 (09-23-21 @ 14:00) (16 - 51)  SpO2: 99% (09-23-21 @ 14:00) (99% - 100%)        I&O's Summary    22 Sep 2021 07:01  -  23 Sep 2021 07:00  --------------------------------------------------------  IN: 1585 mL / OUT: 1150 mL / NET: 435 mL    23 Sep 2021 07:01  -  23 Sep 2021 14:48  --------------------------------------------------------  IN: 540 mL / OUT: 450 mL / NET: 90 mL        Physical Exam  Appearance: No Acute Distress, thin-appearing, lethargic   HEENT: JVP non elevated  Cardiovascular: VAD hum  Respiratory: Clear to auscultation bilaterally  Gastrointestinal: mildly distended, tenderness in LLQ  Neurologic: Non-focal  Extremities: No LE edema, warm and well perfused  Lines/drains: perc dawn drain in place, brownish contents, no bloody contents    LVAD Interrogation  VAD TYPE HM 2  Speed 9200  Flow 5.7  Power 5.8  PI 5.8  Assessment of driveline exit site: driveline exit site c/d/i  Programming changes: no changes made    Labs:                        10.7   11.78 )-----------( 384      ( 23 Sep 2021 01:15 )             32.9     09-23    131<L>  |  95<L>  |  16  ----------------------------<  148<H>  4.5   |  25  |  0.43<L>    Ca    9.6      23 Sep 2021 01:15  Phos  3.5     09-23  Mg     1.9     09-23    TPro  8.6<H>  /  Alb  3.1<L>  /  TBili  0.7  /  DBili  x   /  AST  34  /  ALT  35  /  AlkPhos  262<H>  09-23              Lactate Dehydrogenase, Serum: 400 U/L (09-23 @ 01:15)  Lactate Dehydrogenase, Serum: 373 U/L (09-22 @ 01:05)  Lactate Dehydrogenase, Serum: 366 U/L (09-21 @ 00:24)

## 2021-09-23 NOTE — SWALLOW BEDSIDE ASSESSMENT ADULT - SLP GENERAL OBSERVATIONS
Pt seen at bedside, OOB in chair, awake and alert, oriented to self and hospital, verbally responsive but poorly intelligible due to dysphonia due to trach, following directions for the purposes of the exam.
Patient encountered awake and alert, OOB in chair, +4L/NC, grossly A&Ox3. +Gauze covering open stoma; +mild amount of clear secretions at stoma site; +air leak with weak/hoarse/breathy vocal quality. Inconsistently followed simple commands.

## 2021-09-23 NOTE — PROGRESS NOTE ADULT - ASSESSMENT
65 year old male with history of Stage D HF due to NICM on LVAD, TV annuloplasty ring 9/12/17 as destination therapy due to severe peripheral artery disease with significant stenosis  SIADH, Depression, CKD-3 with hyperkalemia, admitted 6/14/21 with symptomatic anemia with Hgb 4.5 in setting of INR 8.8, thereafter with complicated hospital course including VAP, serratia bacteremia with acalculous cholecystitis s/p percutaneous tube, abdominal pain s/p attempted SMA stent on 9/10/21, and now found to have L RP bleed.     Endocrine consulted for management of hyperthyroidism in the setting of recent amiodarone use and 2 IV contrast CTs with (7/28 and 8/13) and steroid induced hyperglycemia on tube feeds, now resolved    1. Hyperthyroidism likely 2/2 Amiodarone and contrast induced thyroiditis  Most likely Type 1 AIT given abrupt presentation after Amiodarone use and positive TPO Ab. Prior to initiation of Amiodarone on 6/14, TSH was normal (1.98). Nearly two months later, on 8/7 - TSH was < 0.01. Administration of contrast also likely worsened TFTs  s/p steroid taper    Recent labs have shown increase in FT4 and TT3 since decrease in MMI dose to 15 mg from 20mg, and recent contrast loads  FT4: 4.0 -> 3.4 -> 3.5 (changed to MMI 15 mg from 20 mg on 9/15) -> 3.1 (9/18) -> 4.0 (9/20)-> 4.9 (9/21)  TT3: Increased to 133 from 90, and 92 before then    Recs  -Ideally, contrast imaging should be deferred until the patient is biochemically euthyroid as further iodine load can exacerbate thyrotoxicosis. However, if contrast imaging is required, risks of deferring the imaging need to be weighed against the risks of further contrast load in this setting.   -CT chest reviewed with radiology - no large or obvious nodules noted in thyroid, however hard to exclude smaller nodules due to presence of trach and artifact  -Worsening TFTs are likely related to recent contrast load for mesenteric angiogram  -Continue with methimazole 10mg every 8 hours.   -Check Free T4 and total T3 on 9/25  -Continue Propranolol 40 mg q 8 hrs. Goal HR 60-80. HR has been in the 90s recently. Will hold off on further Propranolol adjustments at this time    2. Steroid induced hyperglycemia, s/p completion of steroids  HbA1c 5.4%. FS at goal 100-180  -Continue only low dose correctional scale q 6 hrs

## 2021-09-23 NOTE — PROGRESS NOTE ADULT - SUBJECTIVE AND OBJECTIVE BOX
RICKY JOINT  MRN#: [id number]  Subjective:  Pulmonary progress  : recurrent Acute hypoxic respiratory Failure ,aspiration pneumonia, NICM  , chart reviewed and H/O obtained radiological and Laboratory study reviewed patient Examined     65M PMH ACC/AHA stage D HF due to NICM HM2 LVAD , TV annuloplasty ring 17 as destination therapy due to severe peripheral artery disease with significant stenosis  SIADH, Depression, CKD-3 with hyperkalemia, past E. coli UTIs, driveline drainage (21) and COVID-19 (back in 2020)  He was recently seen in clinic where he complained of abdominal pain and dark stools w constipation back in May. He presents to [facility] ER today weakness and fatigue, moderate and + Black stools for three days, on coumadin secondary to warfarin use in the setting of an LVAD. Patient has required transfusions for GIB in the past. Mostly recently back in 2021 pt had anemia with dark stools. No interventions was done at that time. However Last Endoscopy was done in 2020 (negative). Today labs show patient is anemic with H/H of 4.5/16.3,. INR is 8.84 MAP in the 90s, Temp 35.1. He denies any chest pain, shortness of breath, dizziness, abd pain, nausea or vomiting. found to have  rectal bleeding underwent endoscopy ,old blood in the proximal ileum ,  develop sepsis with LL opacity given Antibiotics , Extubated , reintubated , Bronchoscopy on Zosyn for LL pneumonia  and Amiodarone S/P TV Annuloplasty , patient remain intubated on full ventilatory support .S/P multiple units of blood transfusion , remain on full ventilatory support on Precedex and propofol , new central IJ line , diarrhea C diff. +ve on po vancomycin and IV Flagyl,  mildly distended belly , fever start on cefepime 2gm q 8 hrs S/P tracheostomy .  new RT Subclavian central line continue on contact  isolation ,still diarrhea on C-diff antibiotics ENT follow up appreciated , trial of C-PAP as tolerated , , copious secretion from trach. site chest x ray left lower lobe pneumonia , tolerating trch. collar 50% FI02 still excessive secretion need pulmonary toilet , off Ancef antibiotic , no more diarrhea back on full support mechanical ventilator , chest x ray show improvement in LLL air space disease, more awake and responsive on tube feeding no more diarrhea ,  no nausea or vomiting or diarrhea still very weak and tired , back on tube feeding ,still on po vancomycin , getting PT and OT at bed side , no plan for decannulation for now. , no more diarrhea receiving PT and OPT at bed side , minimal secretion from tracheostomy site , no SOB getting stronger , improve muscle tone patient transfer to monitor bed still on contact isolation for C-Difficel colitis on 50% FI02, and change to Suresh  tube feeding still loose stool . H/H drop significantly require blood transfusion , most likely GI bleeding , IV heparin D/C ,  H/H is stable ., patient develop TR sided  pneumothorax require chest tube placement , RT IJ central line  placed , develop fever shaking chills , blood culture positive for serratia marcescens , start on cefepime .the patient  become hypoxic and hypotensive placed on full ventilatory support and Vasopressin , levophed and Dobutamine ,S/P blood transfusion on meropenem and vancomycin ,   , on and  off pressors , occasional agitation on Precedex .S/P IR cholecystostomy tube drainage placement in the RT upper Quadrant , resume anticoagulation chest x ray noted C-PAP trail lasted only for 2 hrs , new RT SC line and D/C RT IJ line , RT pig tail cathter has been removed , tolerating C-PAP trial placed on trach. collar 50% FI02 GI consultant noted on NG tube feeding as tolerated , develop AF RVR S/P  bolus Amiodarone  back to regular sinus Rhythm , Flat Affect depressed , back on tube feeding Vital AF at 60 cc/ hr .still intermittent abdominal pain , no fever saturation is accepted  back on full ventilatory support , tired and sleepy on round    .start  back on  tube feeding . tolerating it very well , no nausea or vomiting , good 02 saturation on trac-collar on methimazole for hyperthyroidism , no new C/O no plan for decannulation yet , electrolyte blood test is still pending .on respiratory isolation sputum culture is negative , increase WBC  improved on cefepime , sputum positive for enterococcus , condition is the same ,still C/O Abdominal pain , white count is improving , no chest pain or SOB ,  .placed on Reglan 10 mg TID for gastric motility , depressed , withdrawn. on full NG tube diet with Vital , secretion are much improved , went for mesenteric angiogram , unable to stent SMA S/P 3 units of blood transfusion on trach. collar 40% Fi02, anxious to eat discuss possible decannulation,  H/H  are stable vascular  note appreciated no mediate plan for repeat mesenteric angiogram remain on trach.-collar at 40% FI02, RT IJ in place reassessed for stent in the SMA by vascular, no plan for now depresses and withdrawn on meropenem and po vancomycin for elevated white count pending culture no plan for further vascular study or decannulation  , no events overnight , white count is trending down , dark stool stable H/H ,surgical opinion for possible Lap cholecystectomy. no plan for surgery refuse PEG tube placement , no new over night events      (2021 16:57)    PAST MEDICAL & SURGICAL HISTORY:  CHF (congestive heart failure)    CAD (coronary artery disease)  Depression    Pleural effusion    History of 2019 novel coronavirus disease (COVID-19)  2020    Hemorrhoids    Bleeding hemorrhoids    Peripheral arterial disease    Claudication    BPH with urinary obstruction    ACC/AHA stage D systolic heart failure  Anticoagulation goal of INR 2.0 to 2.5    Falls    Clavicle fracture    CKD (chronic kidney disease), stage III    Iron deficiency anemia    H/O epistaxis    Vertigo    GI bleed    S/P TVR (tricuspid valve repair)    S/P ventricular assist device    S/P endoscopy    OBJECTIVE:  ICU Vital Signs Last 24 Hrs  T(C): 38.2 (2021 10:00), Max: 38.5 (2021 12:00)  T(F): 100.8 (2021 10:00), Max: 101.3 (2021 12:00)  HR: 65 (2021 10:00) (61 - 69)  BP: --  BP(mean): --  ABP: 105/67 (2021 10:00) (90/54 - 113/64)  ABP(mean): 77 (2021 10:00) (63 - 77)  RR: 20 (2021 10:00) (19 - 35)  SpO2: 99% (2021 10:00) (96% - 100%)       @ 07:01  -   @ 07:00  --------------------------------------------------------  IN: 2693.9 mL / OUT: 1415 mL / NET: 1278.9 mL     @ 07: @ 10:49  --------------------------------------------------------  IN: 420.8 mL / OUT: 115 mL / NET: 305.8 mL  PHYSICAL EXAM:Daily   Elderly male S/P tracheostomy on full ventilatory support  50% FI02 ,  Daily Weight in k.4 (2021 00:00)  HEENT:     + NCAT  + EOMI  - Conjuctival edema   - Icterus   - Thrush   - ETT  + NGT/OGT  Neck:         + FROM  RT IJ  line JVD  - Nodes - Masses + Mid-line trachea + Tracheostomy  Chest:            normal A-P diameter    Lungs:          + CTA   + Rhonchi    - Rales    - Wheezing + Decreased  LT BS   - Dullness R L  Cardiac:       + S1 + S2    + RRR   - Irregular   - S3  - S4    - Murmurs   - Rub   - Hamman’s sign   Abdomen:    + BS  + Soft + Non-tender - Distended - Organomegaly - PEG .cholecystostomy tube in place  Extremities:   - Cyanosis U/L   - Clubbing  U/L  + LE/UE Edema   + Capillary refill    + Pulses   Neuro:        - Awake   -  Alert   - Confused   - Lethargic   + Sedated  + Generalized Weakness  Skin:        - Rashes    - Erythema   + Normal incisions   + IV sites intact          HOSPITAL MEDICATIONS: All medications reviewed and analyzed  MEDICATIONS  (STANDING):  amiodarone    Tablet 200 milliGRAM(s) Oral daily  chlorhexidine 0.12% Liquid 15 milliLiter(s) Oral Mucosa every 12 hours  chlorhexidine 2% Cloths 1 Application(s) Topical <User Schedule>  dexmedetomidine Infusion 0.5 MICROgram(s)/kG/Hr (9.81 mL/Hr) IV Continuous <Continuous>  dextrose 50% Injectable 50 milliLiter(s) IV Push every 15 minutes  heparin  Infusion 400 Unit(s)/Hr (12.5 mL/Hr) IV Continuous <Continuous>  Hydromorphone  Injectable 0.5 milliGRAM(s) IV Push once  insulin lispro (ADMELOG) corrective regimen sliding scale   SubCutaneous every 6 hours  pantoprazole  Injectable 40 milliGRAM(s) IV Push every 12 hours  piperacillin/tazobactam IVPB.. 3.375 Gram(s) IV Intermittent every 8 hours  propofol Infusion 20 MICROgram(s)/kG/Min (9.42 mL/Hr) IV Continuous <Continuous>  sodium chloride 0.9% lock flush 3 milliLiter(s) IV Push every 8 hours  sodium chloride 0.9%. 1000 milliLiter(s) (10 mL/Hr) IV Continuous <Continuous>    MEDICATIONS  (PRN):  acetaminophen    Suspension .. 650 milliGRAM(s) Oral every 6 hours PRN Temp greater or equal to 38C (100.4F)    LABS: All Lab data reviewed and analyzed                         10.7   11.78 )-----------( 384      ( 23 Sep 2021 01:15 )             32.9    -    131<L>  |  95<L>  |  16  ----------------------------<  148<H>  4.5   |  25  |  0.43<L>    Ca    9.6      23 Sep 2021 01:15  Phos  3.5     09-  Mg     1.9         TPro  8.6<H>  /  Alb  3.1<L>  /  TBili  0.7  /  DBili  x   /  AST  34  /  ALT  35  /  AlkPhos  262<H>      Ca    9.3      22 Sep 2021 01:05  Phos  3.3     09-  Mg     1.8         TPro  8.2  /  Alb  3.1<L>  /  TBili  0.8  /  DBili  x   /  AST  31  /  ALT  32  /  AlkPhos  233<H>        Ca    9.3      21 Sep 2021 00:24  Phos  3.2     09-  Mg     1.8         TPro  7.7  /  Alb  2.9<L>  /  TBili  0.8  /  DBili  x   /  AST  26  /  ALT  28  /  AlkPhos  230<H>      Ca    9.1      20 Sep 2021 00:28  Phos  2.9     -  Mg     2.0     -                                                                                                                                                  PTT - ( 2021 04:52 )  PTT:45.2 sec LIVER FUNCTIONS - ( 2021 00:42 )  Alb: 3.4 g/dL / Pro: 6.7 g/dL / ALK PHOS: 213 U/L / ALT: 15 U/L / AST: 24 U/L / GGT: x           RADIOLOGY: - Reviewed and analyzed RT Pig tail cathter  , LVAD HM2, CT scan of abdomen reviewed result noted

## 2021-09-24 NOTE — PROGRESS NOTE ADULT - ASSESSMENT
Assessment and Recommendation:   · Assessment	  Assessment and recommendation :  Recurrent Acute hypoxic respiratory Failure S/P tracheostomy and decannulation   Acute blood loss anemia S/P multiple  blood transfusion   going for mesenteric angiogram  S/P septic shock off vasopressin , levophed and off  Dobutamine   S/P cholecystostomy tube placement by IR    AF RVR back to regular sinus Rhythm   Non ischemic cardiomyopathy continue ACE inhibitor and B-Blockers   mesenteric ischemia failure to stent SMA , no plan for repeated trial   S/P 3 unit of blood transfusion   S/P Septic shock and cardiogenic shock   Stage D systolic heart failure S/P LVAD HM2   MH2 LVAD  with  TV Annuloplasty  Severe peripheral vascular disease  surgical reassessment for possible cholecystectomy   severe hyperglycemia on insulin coverage    on meropenem and po vancomycin   Reglan 10 mg for Gastric Motility   hyperthyroidism on Methimazole 10mg TID   critical care polyneuropathy   Anemia of Acute blood Loss   severe protein caloric malnutrition   S/P blood and FFP transfusion   Chronic kidney disease stage III  Depressed and withdrawn   on   NGT feeding on Vital  advanced to 55 cc/ hr   refuse PEG tube placement   GI prophylaxis with PPI   Discussed with TCV team and vascular

## 2021-09-24 NOTE — PROGRESS NOTE ADULT - SUBJECTIVE AND OBJECTIVE BOX
Subjective: Patient seen and examined resting in bed. ON no issues.     Medications:  acetaminophen   Tablet .. 650 milliGRAM(s) Oral every 6 hours PRN  chlorhexidine 0.12% Liquid 15 milliLiter(s) Oral Mucosa two times a day  chlorhexidine 2% Cloths 1 Application(s) Topical <User Schedule>  dextrose 40% Gel 15 Gram(s) Oral once  dextrose 50% Injectable 25 Gram(s) IV Push once  dextrose 50% Injectable 12.5 Gram(s) IV Push once  glucagon  Injectable 1 milliGRAM(s) IntraMuscular once  heparin  Infusion 500 Unit(s)/Hr IV Continuous <Continuous>  insulin lispro (ADMELOG) corrective regimen sliding scale   SubCutaneous every 6 hours  lidocaine   4% Patch 1 Patch Transdermal daily  methimazole 10 milliGRAM(s) Oral every 8 hours  metoclopramide Injectable 10 milliGRAM(s) IV Push every 8 hours  mirtazapine 7.5 milliGRAM(s) Oral daily  multivitamin 1 Tablet(s) Oral daily  ondansetron Injectable 4 milliGRAM(s) IV Push every 6 hours PRN  pantoprazole  Injectable 40 milliGRAM(s) IV Push every 12 hours  piperacillin/tazobactam IVPB.. 3.375 Gram(s) IV Intermittent every 8 hours  propranolol 40 milliGRAM(s) Oral every 8 hours  sertraline 100 milliGRAM(s) Oral daily  simethicone 80 milliGRAM(s) Chew every 8 hours PRN  sodium chloride 0.9% lock flush 10 milliLiter(s) IV Push every 1 hour PRN  sodium chloride 0.9%. 1000 milliLiter(s) IV Continuous <Continuous>  thiamine 100 milliGRAM(s) Oral daily  vancomycin    Solution 125 milliGRAM(s) Oral every 12 hours      ICU Vital Signs Last 24 Hrs  T(C): 36 (24 Sep 2021 12:00), Max: 36.7 (24 Sep 2021 08:00)  T(F): 96.8 (24 Sep 2021 12:00), Max: 98 (24 Sep 2021 08:00)  HR: 104 (24 Sep 2021 13:00) (89 - 109)  BP: --  BP(mean): --  ABP: 92/68 (24 Sep 2021 13:00) (77/62 - 102/68)  ABP(mean): 80 (24 Sep 2021 13:00) (66 - 83)  RR: 36 (24 Sep 2021 13:00) (22 - 42)  SpO2: 100% (24 Sep 2021 13:00) (97% - 100%)      I&O's Summary    23 Sep 2021 07:01  -  24 Sep 2021 07:00  --------------------------------------------------------  IN: 1455 mL / OUT: 1301 mL / NET: 154 mL    24 Sep 2021 07:01  -  24 Sep 2021 13:19  --------------------------------------------------------  IN: 210 mL / OUT: 150 mL / NET: 60 mL        Physical Exam:  Appearance: No Acute Distress, thin-appearing, lethargic   HEENT: JVP non elevated, +guaze covering stoma   Cardiovascular: VAD hum  Respiratory: Clear to auscultation bilaterally  Gastrointestinal: mildly distended, tenderness in LLQ  Neurologic: Non-focal  Extremities: No LE edema, warm and well perfused  Lines/drains: perc dawn drain in place, brownish contents, no bloody contents    LVAD Interrogation  VAD TYPE HM 2  Speed 9600  Flow 5.5  Power 5.8  PI 6.3  Assessment of driveline exit site: driveline dressing c/d/i  Programming changes: no changes made    Labs:                        10.4   12.51 )-----------( 405      ( 24 Sep 2021 00:46 )             33.9     09-24    133<L>  |  97  |  22  ----------------------------<  149<H>  4.9   |  26  |  0.38<L>    Ca    9.7      24 Sep 2021 00:46  Phos  3.5     09-24  Mg     2.0     09-24    TPro  8.8<H>  /  Alb  3.1<L>  /  TBili  0.8  /  DBili  x   /  AST  50<H>  /  ALT  47<H>  /  AlkPhos  288<H>  09-24              Lactate Dehydrogenase, Serum: 439 U/L (09-24 @ 00:46)  Lactate Dehydrogenase, Serum: 400 U/L (09-23 @ 01:15)  Lactate Dehydrogenase, Serum: 373 U/L (09-22 @ 01:05)

## 2021-09-24 NOTE — PROGRESS NOTE ADULT - SUBJECTIVE AND OBJECTIVE BOX
RICKY JOINT  MRN#: [id number]  Subjective:  Pulmonary progress  : recurrent Acute hypoxic respiratory Failure ,aspiration pneumonia, NICM  , chart reviewed and H/O obtained radiological and Laboratory study reviewed patient Examined     65M PMH ACC/AHA stage D HF due to NICM HM2 LVAD , TV annuloplasty ring 17 as destination therapy due to severe peripheral artery disease with significant stenosis  SIADH, Depression, CKD-3 with hyperkalemia, past E. coli UTIs, driveline drainage (21) and COVID-19 (back in 2020)  He was recently seen in clinic where he complained of abdominal pain and dark stools w constipation back in May. He presents to [facility] ER today weakness and fatigue, moderate and + Black stools for three days, on coumadin secondary to warfarin use in the setting of an LVAD. Patient has required transfusions for GIB in the past. Mostly recently back in 2021 pt had anemia with dark stools. No interventions was done at that time. However Last Endoscopy was done in 2020 (negative). Today labs show patient is anemic with H/H of 4.5/16.3,. INR is 8.84 MAP in the 90s, Temp 35.1. He denies any chest pain, shortness of breath, dizziness, abd pain, nausea or vomiting. found to have  rectal bleeding underwent endoscopy ,old blood in the proximal ileum ,  develop sepsis with LL opacity given Antibiotics , Extubated , reintubated , Bronchoscopy on Zosyn for LL pneumonia  and Amiodarone S/P TV Annuloplasty , patient remain intubated on full ventilatory support .S/P multiple units of blood transfusion , remain on full ventilatory support on Precedex and propofol , new central IJ line , diarrhea C diff. +ve on po vancomycin and IV Flagyl,  mildly distended belly , fever start on cefepime 2gm q 8 hrs S/P tracheostomy .  new RT Subclavian central line continue on contact  isolation ,still diarrhea on C-diff antibiotics ENT follow up appreciated , trial of C-PAP as tolerated , , copious secretion from trach. site chest x ray left lower lobe pneumonia , tolerating trch. collar 50% FI02 still excessive secretion need pulmonary toilet , off Ancef antibiotic , no more diarrhea back on full support mechanical ventilator , chest x ray show improvement in LLL air space disease, more awake and responsive on tube feeding no more diarrhea ,  no nausea or vomiting or diarrhea still very weak and tired , back on tube feeding ,still on po vancomycin , getting PT and OT at bed side , no plan for decannulation for now. , no more diarrhea receiving PT and OPT at bed side , minimal secretion from tracheostomy site , no SOB getting stronger , improve muscle tone patient transfer to monitor bed still on contact isolation for C-Difficel colitis on 50% FI02, and change to Suresh  tube feeding still loose stool . H/H drop significantly require blood transfusion , most likely GI bleeding , IV heparin D/C ,  H/H is stable ., patient develop TR sided  pneumothorax require chest tube placement , RT IJ central line  placed , develop fever shaking chills , blood culture positive for serratia marcescens , start on cefepime .the patient  become hypoxic and hypotensive placed on full ventilatory support and Vasopressin , levophed and Dobutamine ,S/P blood transfusion on meropenem and vancomycin ,   , on and  off pressors , occasional agitation on Precedex .S/P IR cholecystostomy tube drainage placement in the RT upper Quadrant , resume anticoagulation chest x ray noted C-PAP trail lasted only for 2 hrs , new RT SC line and D/C RT IJ line , RT pig tail cathter has been removed , tolerating C-PAP trial placed on trach. collar 50% FI02 GI consultant noted on NG tube feeding as tolerated , develop AF RVR S/P  bolus Amiodarone  back to regular sinus Rhythm , Flat Affect depressed , back on tube feeding Vital AF at 60 cc/ hr .still intermittent abdominal pain , no fever saturation is accepted  back on full ventilatory support , tired and sleepy on round    .start  back on  tube feeding . tolerating it very well , no nausea or vomiting , good 02 saturation on trac-collar on methimazole for hyperthyroidism , no new C/O no plan for decannulation yet , electrolyte blood test is still pending .on respiratory isolation sputum culture is negative , increase WBC  improved on cefepime , sputum positive for enterococcus , condition is the same ,still C/O Abdominal pain , white count is improving , no chest pain or SOB ,  .placed on Reglan 10 mg TID for gastric motility , depressed , withdrawn. on full NG tube diet with Vital , secretion are much improved , went for mesenteric angiogram , unable to stent SMA S/P 3 units of blood transfusion on trach. collar 40% Fi02, anxious to eat discuss possible decannulation,  H/H  are stable vascular  note appreciated no mediate plan for repeat mesenteric angiogram remain on trach.-collar at 40% FI02, RT IJ in place reassessed for stent in the SMA by vascular, no plan for now depresses and withdrawn on meropenem and po vancomycin for elevated white count pending culture no plan for further vascular study or decannulation  , no events overnight , white count is trending down , dark stool stable H/H ,surgical opinion for possible Lap cholecystectomy. no plan for surgery refuse PEG tube placement , no new over night events 3  patient is decannulated tolerating it very well .     (2021 16:57)    PAST MEDICAL & SURGICAL HISTORY:  CHF (congestive heart failure)    CAD (coronary artery disease)  Depression    Pleural effusion    History of 2019 novel coronavirus disease (COVID-19)  2020    Hemorrhoids    Bleeding hemorrhoids    Peripheral arterial disease    Claudication    BPH with urinary obstruction    ACC/AHA stage D systolic heart failure  Anticoagulation goal of INR 2.0 to 2.5    Falls    Clavicle fracture    CKD (chronic kidney disease), stage III    Iron deficiency anemia    H/O epistaxis    Vertigo    GI bleed    S/P TVR (tricuspid valve repair)    S/P ventricular assist device    S/P endoscopy    OBJECTIVE:  ICU Vital Signs Last 24 Hrs  T(C): 38.2 (2021 10:00), Max: 38.5 (2021 12:00)  T(F): 100.8 (2021 10:00), Max: 101.3 (2021 12:00)  HR: 65 (2021 10:00) (61 - 69)  BP: --  BP(mean): --  ABP: 105/67 (2021 10:00) (90/54 - 113/64)  ABP(mean): 77 (2021 10:00) (63 - 77)  RR: 20 (2021 10:00) (19 - 35)  SpO2: 99% (2021 10:00) (96% - 100%)      -19 @ 07:01  -  06-20 @ 07:00  --------------------------------------------------------  IN: 2693.9 mL / OUT: 1415 mL / NET: 1278.9 mL     @ 07:01   @ 10:49  --------------------------------------------------------  IN: 420.8 mL / OUT: 115 mL / NET: 305.8 mL  PHYSICAL EXAM:Daily   Elderly male S/P tracheostomy S/O decannulation   Daily Weight in k.4 (2021 00:00)  HEENT:     + NCAT  + EOMI  - Conjuctival edema   - Icterus   - Thrush   - ETT  + NGT/OGT  Neck:         + FROM  RT IJ  line JVD  - Nodes - Masses + Mid-line trachea - Tracheostomy  Chest:            normal A-P diameter    Lungs:          + CTA   + Rhonchi    - Rales    - Wheezing + Decreased  LT BS   - Dullness R L  Cardiac:       + S1 + S2    + RRR   - Irregular   - S3  - S4    - Murmurs   - Rub   - Hamman’s sign   Abdomen:    + BS  + Soft + Non-tender - Distended - Organomegaly - PEG .cholecystostomy tube in place  Extremities:   - Cyanosis U/L   - Clubbing  U/L  + LE/UE Edema   + Capillary refill    + Pulses   Neuro:        - Awake   -  Alert   - Confused   - Lethargic   + Sedated  + Generalized Weakness  Skin:        - Rashes    - Erythema   + Normal incisions   + IV sites intact          HOSPITAL MEDICATIONS: All medications reviewed and analyzed  MEDICATIONS  (STANDING):  amiodarone    Tablet 200 milliGRAM(s) Oral daily  chlorhexidine 0.12% Liquid 15 milliLiter(s) Oral Mucosa every 12 hours  chlorhexidine 2% Cloths 1 Application(s) Topical <User Schedule>  dexmedetomidine Infusion 0.5 MICROgram(s)/kG/Hr (9.81 mL/Hr) IV Continuous <Continuous>  dextrose 50% Injectable 50 milliLiter(s) IV Push every 15 minutes  heparin  Infusion 400 Unit(s)/Hr (12.5 mL/Hr) IV Continuous <Continuous>  Hydromorphone  Injectable 0.5 milliGRAM(s) IV Push once  insulin lispro (ADMELOG) corrective regimen sliding scale   SubCutaneous every 6 hours  pantoprazole  Injectable 40 milliGRAM(s) IV Push every 12 hours  piperacillin/tazobactam IVPB.. 3.375 Gram(s) IV Intermittent every 8 hours  propofol Infusion 20 MICROgram(s)/kG/Min (9.42 mL/Hr) IV Continuous <Continuous>  sodium chloride 0.9% lock flush 3 milliLiter(s) IV Push every 8 hours  sodium chloride 0.9%. 1000 milliLiter(s) (10 mL/Hr) IV Continuous <Continuous>    MEDICATIONS  (PRN):  acetaminophen    Suspension .. 650 milliGRAM(s) Oral every 6 hours PRN Temp greater or equal to 38C (100.4F)    LABS: All Lab data reviewed and analyzed                         10. )-----------( 405      ( 24 Sep 2021 00:46 )             33.9   -    133<L>  |  97  |  22  ----------------------------<  149<H>  4.9   |  26  |  0.38<L>    Ca    9.7      24 Sep 2021 00:46  Phos  3.5     -  Mg     2.0         TPro  8.8<H>  /  Alb  3.1<L>  /  TBili  0.8  /  DBili  x   /  AST  50<H>  /  ALT  47<H>  /  AlkPhos  288<H>      Ca    9.6      23 Sep 2021 01:15  Phos  3.5       Mg     1.9         TPro  8.6<H>  /  Alb  3.1<L>  /  TBili  0.7  /  DBili  x   /  AST  34  /  ALT  35  /  AlkPhos  262<H>      Ca    9.3      22 Sep 2021 01:05  Phos  3.3     -  Mg     1.8         TPro  8.2  /  Alb  3.1<L>  /  TBili  0.8  /  DBili  x   /  AST  31  /  ALT  32  /  AlkPhos  233<H>                                                                                                                                                        PTT - ( 2021 04:52 )  PTT:45.2 sec LIVER FUNCTIONS - ( 2021 00:42 )  Alb: 3.4 g/dL / Pro: 6.7 g/dL / ALK PHOS: 213 U/L / ALT: 15 U/L / AST: 24 U/L / GGT: x           RADIOLOGY: - Reviewed and analyzed RT Pig tail cathter  , LVAD HM2, CT scan of abdomen reviewed result noted

## 2021-09-24 NOTE — CHART NOTE - NSCHARTNOTEFT_GEN_A_CORE
Pt seen at bedside for follow-up to monitor for participation in dysphagia evaluation. Pt now s/p decannulation, with stoma noted to be healing. Pt would benefit from more complete stoma closure prior to swallow evaluation to improve potential for success. Case d/w CLAUDIA Ambrocio and PATIENCE Altamirano. Will plan for FEES exam for Monday, as medically appropriate.     Gege Hernandez MA, CCC-SLP  Speech-Language Pathologist  pager #[phone]

## 2021-09-24 NOTE — PROGRESS NOTE ADULT - PROBLEM SELECTOR PLAN 2
- Continue current speed of 9200 RPM. Speed increased this admission due to signs of inadequately unloaded left ventricle  - MAP is at goal at this time  - Will trail patient on heparin gtt with low PTT goal given elevated LDH levels   - Currently off coumadin and aspirin  given recurrent GI bleeding.   - LDH level remains spoked to 439 today. Will trail heparin gtt and see if patient is able to tolerate. Plase free hemoglobin level pending  - May need to consider repeating ECHO over the weekend, last ECHO was completed 7/26 - Continue current speed of 9200 RPM. Speed increased this admission due to signs of inadequately unloaded left ventricle  - MAP is at goal at this time  - Will trail patient on heparin gtt with low PTT goal given elevated LDH levels   - Currently off coumadin and aspirin  given recurrent GI bleeding.   - LDH level remains spoked to 439 today. Will trail heparin gtt and see if patient is able to tolerate. Plasma free hemoglobin level pending  - May need to consider repeating ECHO over the weekend, last ECHO was completed 7/26

## 2021-09-24 NOTE — PROGRESS NOTE ADULT - ATTENDING COMMENTS
65 YO M with a history of stage D NICM s/p HM2 on 9/2017 as DT (due to severe PAD) with TV ring, prior COVID-19 infection 4/2020, recurrent syncope post LVAD s/p ILR, and chronic abdominal pain who was admitted June 2021 for GI bleeding who has had a long and complicated course.  - He has been HD stable, no signifcant alarms noted on the LVAD  - LDH is rising, he has been off AC due to bleeding. Initially rise in LDH was attributed to RP bleed from an interventional procedure but there is now concern for thrombosis. Will send plasma free hemoglobin. Will challenge with low dose heparin  - Has been tolerating tube feeds now. He does not want a PEG/PEJ tube. He has been decannulated and will undergo a formal speech and swallo eval next week  - In regards to cholecystostomy tube, general surgery has evaluated the patient and they state they would prefer to do chronic tube management rather than removing his gall bladder  - He has been noted to have a rising WBC now that he is off antibiotics. Will resume antibiotics but may need long term suppressive therapy

## 2021-09-24 NOTE — PROGRESS NOTE ADULT - SUBJECTIVE AND OBJECTIVE BOX
RICKY HAMPTON  MRN-[id number]  Patient is a 66y old  Male who presents with a chief complaint of Anemia, Supratherapeutic INR, Dark Stools (23 Sep 2021 16:35)    HPI:  65M PMH ACC/AHA stage D HF due to NICM HM2 LVAD , TV annuloplasty ring 17 as destination therapy due to severe peripheral artery disease with significant stenosis  SIADH, Depression, CKD-3 with hyperkalemia, past E. coli UTIs, driveline drainage (21) and COVID-19 (back in 2020)  He was recently seen in clinic where he complained of abdominal pain and dark stools w constipation back in May. He presents to [facility] ER today weakness and fatigue, moderate and + Black stools for three days, on coumadin secondary to warfarin use in the setting of an LVAD. Patient has required transfusions for GIB in the past. Mostly recently back in 2021 pt had anemia with dark stools. No interventions was done at that time. However Last Endoscopy was done in 2020 (negative). Today labs show patient is anemic with H/H of 4.5/16.3,. INR is 8.84 MAP in the 90s, Temp 35.1. He denies any chest pain, shortness of breath, dizziness, abd pain, nausea or vomiting.       (2021 16:57)      Surgery/Hospital Course:   admit for melena w/ anemia, INR 8.84   6/15 Capsul study (+) for small bowel bleed, balloon endoscopy (old blood in prox ileum); post EGD - septic w/ L opacity, re-intubated for concern for aspiration, TTE (Mod MR, decrease biV w/ interventricular septum boweing towards R)   bronch    +C Diff    CT C/A/P: Fluid filled colon which may be 2/2 rapid transit. Small bilateral pleffs with associates. Compressive atelectasis New ISABELLE & LLL  parenchymal opacities, suspicious for pneumonia. 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    #8 Shiley trach at bedside    LVAD speed increased to 9200   Bronch   TC since . Patient transferred to SDU.    INR today 2.64.  H&H 7.3/24 this AM.  Will repeat CBC at noon, and will send stool guaiac Patient with persistent abdominal tenderness, rate of tube feeds decreased.  No nausea/vomiting.     INR today 2.4. H&H 9.1/28.6 low flow overnight /N&V, refusing Tube feeds on D5 1/2 normal  @50 cc/hr   INR 2.69  H&H 7.7/.1 refusing Tube feeds on D5 1/2 normal  @50 cc/hr. This am + BM Melena Dr Oneill HF  aware- PRBC x1  GI team consulted -  NPO  plan on study in am-  D/w Dr Cadet Patient  to return to CTU for further management; 1PRBCS    Post op INR 2.2 today.  No bleeding. BC + for SM.  Pt is hypotensive requiring pressor and inotropic support.  ID follow up today on Cefepime will follow.   R PTC for PTX    CT C/A/P: sub q emphysema in R chest wall, GGO RUL, small ascites CTH negative; Abd US: GB thickening, pericholecystic fluid     Perchole drain in place continues to drain total output overnight 133.  Fever today 38.8    duplex LE negative    Patient with persistent abdominal pain, refusing tube feeds and medications, Psych consulted   CTA A/P ordered to r/o mesenteric ischemia 2/2 persistent anorexia, nausea, vomiting. Revealed:  Evaluation of the mesenteric vessels is limited by streak artifact from LVAD. There appears to be severe stenosis of the proximal SMA; abdominal mesenteric doppler is recommended for further evaluation. 2.  No small bowel findings to suggest acute mesenteric ischemia. 3.  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  8/15: Cultures resulted BC 1/2 +GPC in clusters, SC enterobacter; mesenteric duplex: borderline stenosis of proximal SMA  : CT C/A/P noncon: Nondular opacities in R lung apex w/cavitation, abd nl  :  Continue current care, treatment of thyrotoxicosis with medications as per endocrine, d/c ABX as per team.    RUQ sono: Contracted gallbladder with cholecystostomy tube in place.  9/10 failed SMA stent, L fem PSA, s/p 3U PRCB   CTA Abd/Pelvis L RP hematoma    Trach decannulated     Today:  Endorsed episode of tachypnea overnight, currently now on 4L NC. Potential FEES w/ S/S on monday.     REVIEW OF SYSTEMS:  Unable to obtain, pt had Trach decannulated on       ICU Vital Signs Last 24 Hrs  T(C): 36.7 (24 Sep 2021 08:00), Max: 36.7 (24 Sep 2021 08:00)  T(F): 98 (24 Sep 2021 08:00), Max: 98 (24 Sep 2021 08:00)  HR: 101 (24 Sep 2021 08:00) (88 - 112)  BP: --  BP(mean): --  ABP: 91/66 (24 Sep 2021 08:00) (77/62 - 102/68)  ABP(mean): 78 (24 Sep 2021 08:00) (66 - 84)  RR: 34 (24 Sep 2021 08:00) (19 - 42)  SpO2: 100% (24 Sep 2021 08:00) (97% - 100%)      Physical Exam:  Gen:  Awake, alert, NAD  CNS:  intact, nonfocal, responds to all commands  Neck: no JVD, gauze over trach wound  RES : course breath sounds, no wheezing    CVS: +LVAD hum   Abd: Soft. Sybil drain with bilious fluid. Positive BS throughout. Mild RUQ abdominal tenderness by perc sybil.  Skin: No rash, erythema, cyanosis.  Vasc: Warm and well-perfused.  Ext:  no edema      ============================I/O===========================   I&O's Detail    23 Sep 2021 07:01  -  24 Sep 2021 07:00  --------------------------------------------------------  IN:    Enteral Tube Flush: 110 mL    Miscellaneous Tube Feedin mL    sodium chloride 0.9%: 110 mL  Total IN: 1455 mL    OUT:    Drain (mL): 200 mL    Stool (mL): 1 mL    Voided (mL): 1100 mL  Total OUT: 1301 mL    Total NET: 154 mL      24 Sep 2021 07:01  -  24 Sep 2021 08:37  --------------------------------------------------------  IN:    Miscellaneous Tube Feedin mL    sodium chloride 0.9%: 5 mL  Total IN: 65 mL    OUT:  Total OUT: 0 mL    Total NET: 65 mL        ============================ LABS =========================                        10.4   12.51 )-----------( 405      ( 24 Sep 2021 00:46 )             33.9         133<L>  |  97  |  22  ----------------------------<  149<H>  4.9   |  26  |  0.38<L>    Ca    9.7      24 Sep 2021 00:46  Phos  3.5       Mg     2.0         TPro  8.8<H>  /  Alb  3.1<L>  /  TBili  0.8  /  DBili  x   /  AST  50<H>  /  ALT  47<H>  /  AlkPhos  288<H>      LIVER FUNCTIONS - ( 24 Sep 2021 00:46 )  Alb: 3.1 g/dL / Pro: 8.8 g/dL / ALK PHOS: 288 U/L / ALT: 47 U/L / AST: 50 U/L / GGT: x             ABG - ( 24 Sep 2021 00:41 )  pH, Arterial: 7.39  pH, Blood: x     /  pCO2: 52    /  pO2: 129   / HCO3: 32    / Base Excess: 5.5   /  SaO2: 100.0               ======================Micro/Rad/Cardio=================  Culture: Reviewed   CXR: Reviewed  Echo:Reviewed  ======================================================  PAST MEDICAL & SURGICAL HISTORY:  CHF (congestive heart failure)    CAD (coronary artery disease)    Depression    Pleural effusion    History of  novel coronavirus disease (COVID-19)  2020    Hemorrhoids    Bleeding hemorrhoids    Peripheral arterial disease    Claudication    BPH with urinary obstruction    ACC/AHA stage D systolic heart failure    Anticoagulation goal of INR 2.0 to 2.5    Falls    Clavicle fracture    CKD (chronic kidney disease), stage III    Iron deficiency anemia    H/O epistaxis    Vertigo    GI bleed    S/P TVR (tricuspid valve repair)    S/P ventricular assist device    S/P endoscopy      ====================ASSESSMENT ==============  Stage D Nonischemic Cardiomyopathy, Status Post HM2 on 2017    Cardiogenic shock  Hemodynamic instability   Acute hypoxemic respiratory failure s/p trach   GI bleed , Status Post Enteroscopy   Anemia, in setting of melena   Chronic Kidney Disease  Stress hyperglycemia   C.diff positive on    Hypovolemic shock  Septic shock  Leukocytosis  GB thickening/percholecystic s/p perc choley by IT   SMA stenosis  Serratia/citrobacter pneumonia   Stenotrophomonas pneumonia   Enterobacter pneumonia   Nasuea/vomiting  Deconditioning  Hyponatremia    Plan:  ====================== NEUROLOGY=====================  Nonfocal, continue to monitor neuro status    Continue ambulation as tolerated   Tylenol PRN for analgesia   C/w mirtazapine and sertraline for depression  Deconditioned, PT/Ambulate as tolerated    acetaminophen   Tablet .. 650 milliGRAM(s) Oral every 6 hours PRN Mild Pain (1 - 3)  mirtazapine 7.5 milliGRAM(s) Oral daily  sertraline 100 milliGRAM(s) Oral daily  ==================== RESPIRATORY======================  Acute hypoxemic respiratory failure s/p #8 shiley trach on   Tracheostomy decannulated  without issue, on 4L NC, monitor  Continue close monitoring of respiratory rate and breathing pattern, following of ABG’s with A-line monitoring, continuous pulse oximetry monitoring.     ====================CARDIOVASCULAR==================  Stage D Nonischemic Cardiomyopathy, Status Post HM2 on 2017; LVAD settings 9200, flow 5.7  TTE : reveals at least moderate MR, mild AI, severe global LV syst dysfxn, dec RV fxn   Propranolol for rate control of HR 2/2 Hyperthyroidism, titrate as tolerated per Endo  Continue invasive hemodynamic monitoring     SQH restarted for DVT prophylaxiss) Oral every 8 hours  ===================HEMATOLOGIC/ONC ===================  CTA A/P on  +L RP hematoma   Acute blood loss anemia, monitor H&H/Plts    Holding coumadin and ASA given recurrent GI bleeding  SQH restarted for DVT ppx    heparin   Injectable 5000 Unit(s) SubCutaneous every 8 hours  ===================== RENAL =========================  Continue monitoring urine output, I&OS, BUN/Cr   Euvolemic, even fluid balance, currently off diuretics.    Replete lytes PRN. Keep K> 4 and Mg >2   ==================== GASTROINTESTINAL===================  S/p cholecystostomy tube placed on  with IR  Tolerating TFs @ 55cc/hr, increase 5cc/day for goal 65cc/hr as tolerated   CTA A/P : Evaluation of the mesenteric vessels is limited by streak artifact from LVAD. There appears to be severe stenosis of the proximal SMA; abdominal mesenteric doppler is recommended for further evaluation. 2.  No small bowel findings to suggest acute mesenteric ischemia. 3.  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  Mesenteric duplex on 8/15 borderline stenosis of proximal SMA, s/p failed SMA stent, L fem RP hematoma on 9/10, s/p 3U PRBC.   Reglan for gut motility  Zofran PRN nausea/vomiting  No intervention for cholecystostomy tube, PEJ  S&S eval, ice chips PRN, will re-evaluate, poss FEES Monday    multivitamin 1 Tablet(s) Oral daily  pantoprazole  Injectable 40 milliGRAM(s) IV Push every 12 hours  simethicone 80 milliGRAM(s) Chew every 8 hours PRN Gas  sodium chloride 0.9% lock flush 10 milliLiter(s) IV Push every 1 hour PRN Pre/post blood products, medications, blood draw, and to maintain line patency  sodium chloride 0.9%. 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 - Free T4 remains elevated   Per endocrine      - Thyroid US when trach is out      - Propanolol as above for optimal T4-->T3 conversion - titrate as tolerated       - c/w Methimazole       - s/p hydrocortisone    dextrose 40% Gel 15 Gram(s) Oral once  dextrose 50% Injectable 25 Gram(s) IV Push once  dextrose 50% Injectable 12.5 Gram(s) IV Push once  glucagon  Injectable 1 milliGRAM(s) IntraMuscular once  insulin lispro (ADMELOG) corrective regimen sliding scale   SubCutaneous every 6 hours  methimazole 10 milliGRAM(s) Oral every 8 hours  ========================INFECTIOUS DISEASE================  Afebrile, WBC rising 11.78 --> 12.51   Continue trending WBC and monitoring fever curve    Patient requires continuous monitoring with bedside rhythm monitoring, pulse ox monitoring, and intermittent blood gas analysis. Care plan discussed with ICU care team. Patient remained critical and at risk for life threatening decompensation.     By signing my name below, IJanina Tiffany, attest that this documentation has been prepared under the direction and in the presence of CLAUDIA Goldstein   Electronically signed: Emily Roa Scribe, 21 @ 08:37    I, CLAUDIA Goldstein , personally performed the services described in this documentation. all medical record entries made by the luisibmel were at my direction and in my presence. I have reviewed the chart and agree that the record reflects my personal performance and is accurate and complete  Electronically signed: CLAUDIA Goldstein        RICKY HAMPTON  MRN-[id number]  Patient is a 66y old  Male who presents with a chief complaint of Anemia, Supratherapeutic INR, Dark Stools (23 Sep 2021 16:35)    HPI:  65M PMH ACC/AHA stage D HF due to NICM HM2 LVAD , TV annuloplasty ring 17 as destination therapy due to severe peripheral artery disease with significant stenosis  SIADH, Depression, CKD-3 with hyperkalemia, past E. coli UTIs, driveline drainage (21) and COVID-19 (back in 2020)  He was recently seen in clinic where he complained of abdominal pain and dark stools w constipation back in May. He presents to [facility] ER today weakness and fatigue, moderate and + Black stools for three days, on coumadin secondary to warfarin use in the setting of an LVAD. Patient has required transfusions for GIB in the past. Mostly recently back in 2021 pt had anemia with dark stools. No interventions was done at that time. However Last Endoscopy was done in 2020 (negative). Today labs show patient is anemic with H/H of 4.5/16.3,. INR is 8.84 MAP in the 90s, Temp 35.1. He denies any chest pain, shortness of breath, dizziness, abd pain, nausea or vomiting.       (2021 16:57)      Surgery/Hospital Course:   admit for melena w/ anemia, INR 8.84   6/15 Capsul study (+) for small bowel bleed, balloon endoscopy (old blood in prox ileum); post EGD - septic w/ L opacity, re-intubated for concern for aspiration, TTE (Mod MR, decrease biV w/ interventricular septum boweing towards R)   bronch    +C Diff    CT C/A/P: Fluid filled colon which may be 2/2 rapid transit. Small bilateral pleffs with associates. Compressive atelectasis New ISABELLE & LLL  parenchymal opacities, suspicious for pneumonia. 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    #8 Shiley trach at bedside    LVAD speed increased to 9200   Bronch   TC since . Patient transferred to SDU.    INR today 2.64.  H&H 7.3/24 this AM.  Will repeat CBC at noon, and will send stool guaiac Patient with persistent abdominal tenderness, rate of tube feeds decreased.  No nausea/vomiting.     INR today 2.4. H&H 9.1/28.6 low flow overnight /N&V, refusing Tube feeds on D5 1/2 normal  @50 cc/hr   INR 2.69  H&H 7.7/.1 refusing Tube feeds on D5 1/2 normal  @50 cc/hr. This am + BM Melena Dr Oneill HF  aware- PRBC x1  GI team consulted -  NPO  plan on study in am-  D/w Dr Cadet Patient  to return to CTU for further management; 1PRBCS    Post op INR 2.2 today.  No bleeding. BC + for SM.  Pt is hypotensive requiring pressor and inotropic support.  ID follow up today on Cefepime will follow.   R PTC for PTX    CT C/A/P: sub q emphysema in R chest wall, GGO RUL, small ascites CTH negative; Abd US: GB thickening, pericholecystic fluid     Perchole drain in place continues to drain total output overnight 133.  Fever today 38.8    duplex LE negative    Patient with persistent abdominal pain, refusing tube feeds and medications, Psych consulted   CTA A/P ordered to r/o mesenteric ischemia 2/2 persistent anorexia, nausea, vomiting. Revealed:  Evaluation of the mesenteric vessels is limited by streak artifact from LVAD. There appears to be severe stenosis of the proximal SMA; abdominal mesenteric doppler is recommended for further evaluation. 2.  No small bowel findings to suggest acute mesenteric ischemia. 3.  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  8/15: Cultures resulted BC 1/2 +GPC in clusters, SC enterobacter; mesenteric duplex: borderline stenosis of proximal SMA  : CT C/A/P noncon: Nondular opacities in R lung apex w/cavitation, abd nl  :  Continue current care, treatment of thyrotoxicosis with medications as per endocrine, d/c ABX as per team.    RUQ sono: Contracted gallbladder with cholecystostomy tube in place.  9/10 failed SMA stent, L fem PSA, s/p 3U PRCB   CTA Abd/Pelvis L RP hematoma    Trach decannulated     Today:  - Endorsed episode of tachypnea overnight, currently now on 4L NC.   - Potential FEES w/ S/S on monday.     REVIEW OF SYSTEMS:  Unable to obtain, pt had Trach decannulated on       ICU Vital Signs Last 24 Hrs  T(C): 36.7 (24 Sep 2021 08:00), Max: 36.7 (24 Sep 2021 08:00)  T(F): 98 (24 Sep 2021 08:00), Max: 98 (24 Sep 2021 08:00)  HR: 101 (24 Sep 2021 08:00) (88 - 112)  BP: --  BP(mean): --  ABP: 91/66 (24 Sep 2021 08:00) (77/62 - 102/68)  ABP(mean): 78 (24 Sep 2021 08:00) (66 - 84)  RR: 34 (24 Sep 2021 08:00) (19 - 42)  SpO2: 100% (24 Sep 2021 08:00) (97% - 100%)      Physical Exam:  Gen:  Awake, alert, NAD  CNS:  intact, nonfocal, responds to all commands  Neck: no JVD, gauze over trach wound  RES : course breath sounds, no wheezing    CVS: +LVAD hum   Abd: Soft. Sybil drain with bilious fluid. Positive BS throughout. Mild RUQ abdominal tenderness by perc sybil.  Skin: No rash, erythema, cyanosis.  Vasc: Warm and well-perfused.  Ext:  no edema      ============================I/O===========================   I&O's Detail    23 Sep 2021 07:01  -  24 Sep 2021 07:00  --------------------------------------------------------  IN:    Enteral Tube Flush: 110 mL    Miscellaneous Tube Feedin mL    sodium chloride 0.9%: 110 mL  Total IN: 1455 mL    OUT:    Drain (mL): 200 mL    Stool (mL): 1 mL    Voided (mL): 1100 mL  Total OUT: 1301 mL    Total NET: 154 mL      24 Sep 2021 07:01  -  24 Sep 2021 08:37  --------------------------------------------------------  IN:    Miscellaneous Tube Feedin mL    sodium chloride 0.9%: 5 mL  Total IN: 65 mL    OUT:  Total OUT: 0 mL    Total NET: 65 mL        ============================ LABS =========================                        10.4   12.51 )-----------( 405      ( 24 Sep 2021 00:46 )             33.9         133<L>  |  97  |  22  ----------------------------<  149<H>  4.9   |  26  |  0.38<L>    Ca    9.7      24 Sep 2021 00:46  Phos  3.5       Mg     2.0         TPro  8.8<H>  /  Alb  3.1<L>  /  TBili  0.8  /  DBili  x   /  AST  50<H>  /  ALT  47<H>  /  AlkPhos  288<H>      LIVER FUNCTIONS - ( 24 Sep 2021 00:46 )  Alb: 3.1 g/dL / Pro: 8.8 g/dL / ALK PHOS: 288 U/L / ALT: 47 U/L / AST: 50 U/L / GGT: x             ABG - ( 24 Sep 2021 00:41 )  pH, Arterial: 7.39  pH, Blood: x     /  pCO2: 52    /  pO2: 129   / HCO3: 32    / Base Excess: 5.5   /  SaO2: 100.0               ======================Micro/Rad/Cardio=================  Culture: Reviewed   CXR: Reviewed  Echo:Reviewed  ======================================================  PAST MEDICAL & SURGICAL HISTORY:  CHF (congestive heart failure)    CAD (coronary artery disease)    Depression    Pleural effusion    History of  novel coronavirus disease (COVID-19)  2020    Hemorrhoids    Bleeding hemorrhoids    Peripheral arterial disease    Claudication    BPH with urinary obstruction    ACC/AHA stage D systolic heart failure    Anticoagulation goal of INR 2.0 to 2.5    Falls    Clavicle fracture    CKD (chronic kidney disease), stage III    Iron deficiency anemia    H/O epistaxis    Vertigo    GI bleed    S/P TVR (tricuspid valve repair)    S/P ventricular assist device    S/P endoscopy      ====================ASSESSMENT ==============  Stage D Nonischemic Cardiomyopathy, Status Post HM2 on 2017    Cardiogenic shock  Hemodynamic instability   Acute hypoxemic respiratory failure s/p trach   GI bleed , Status Post Enteroscopy   Anemia, in setting of melena   Chronic Kidney Disease  Stress hyperglycemia   C.diff positive on    Hypovolemic shock  Septic shock  Leukocytosis  GB thickening/percholecystic s/p perc choley by IT   SMA stenosis  Serratia/citrobacter pneumonia   Stenotrophomonas pneumonia   Enterobacter pneumonia   Nasuea/vomiting  Deconditioning  Hyponatremia    Plan:  ====================== NEUROLOGY=====================  Nonfocal, continue to monitor neuro status    Continue ambulation as tolerated   Tylenol PRN for analgesia   C/w mirtazapine and sertraline for depression  Deconditioned, PT/Ambulate as tolerated    acetaminophen   Tablet .. 650 milliGRAM(s) Oral every 6 hours PRN Mild Pain (1 - 3)  mirtazapine 7.5 milliGRAM(s) Oral daily  sertraline 100 milliGRAM(s) Oral daily  ==================== RESPIRATORY======================  Acute hypoxemic respiratory failure s/p #8 shiley trach on   Tracheostomy decannulated  without issue, on 4L NC, monitor  Continue close monitoring of respiratory rate and breathing pattern, following of ABG’s with A-line monitoring, continuous pulse oximetry monitoring.     ====================CARDIOVASCULAR==================  Stage D Nonischemic Cardiomyopathy, Status Post HM2 on 2017; LVAD settings 9200, flow 5.7  TTE : reveals at least moderate MR, mild AI, severe global LV syst dysfxn, dec RV fxn   Propranolol for rate control of HR 2/2 Hyperthyroidism, titrate as tolerated per Endo  Continue invasive hemodynamic monitoring     SQH restarted for DVT prophylaxiss) Oral every 8 hours  ===================HEMATOLOGIC/ONC ===================  CTA A/P on  +L RP hematoma   Acute blood loss anemia, monitor H&H/Plts    Holding coumadin and ASA given recurrent GI bleeding  SQH restarted for DVT ppx    heparin   Injectable 5000 Unit(s) SubCutaneous every 8 hours  ===================== RENAL =========================  Continue monitoring urine output, I&OS, BUN/Cr   Euvolemic, even fluid balance, currently off diuretics.    Replete lytes PRN. Keep K> 4 and Mg >2   ==================== GASTROINTESTINAL===================  S/p cholecystostomy tube placed on  with IR  Tolerating TFs @ 55cc/hr, increase 5cc/day for goal 65cc/hr as tolerated   CTA A/P : Evaluation of the mesenteric vessels is limited by streak artifact from LVAD. There appears to be severe stenosis of the proximal SMA; abdominal mesenteric doppler is recommended for further evaluation. 2.  No small bowel findings to suggest acute mesenteric ischemia. 3.  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  Mesenteric duplex on 8/15 borderline stenosis of proximal SMA, s/p failed SMA stent, L fem RP hematoma on 9/10, s/p 3U PRBC.   Reglan for gut motility  Zofran PRN nausea/vomiting  No intervention for cholecystostomy tube, PEJ  S&S eval, ice chips PRN, will re-evaluate, poss FEES Monday    multivitamin 1 Tablet(s) Oral daily  pantoprazole  Injectable 40 milliGRAM(s) IV Push every 12 hours  simethicone 80 milliGRAM(s) Chew every 8 hours PRN Gas  sodium chloride 0.9% lock flush 10 milliLiter(s) IV Push every 1 hour PRN Pre/post blood products, medications, blood draw, and to maintain line patency  sodium chloride 0.9%. 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 - Free T4 remains elevated   Per endocrine      - Thyroid US when trach is out      - Propanolol as above for optimal T4-->T3 conversion - titrate as tolerated       - c/w Methimazole       - s/p hydrocortisone    dextrose 40% Gel 15 Gram(s) Oral once  dextrose 50% Injectable 25 Gram(s) IV Push once  dextrose 50% Injectable 12.5 Gram(s) IV Push once  glucagon  Injectable 1 milliGRAM(s) IntraMuscular once  insulin lispro (ADMELOG) corrective regimen sliding scale   SubCutaneous every 6 hours  methimazole 10 milliGRAM(s) Oral every 8 hours  ========================INFECTIOUS DISEASE================  Afebrile, WBC rising 11.78 --> 12.51   Continue trending WBC and monitoring fever curve    Patient requires continuous monitoring with bedside rhythm monitoring, pulse ox monitoring, and intermittent blood gas analysis. Care plan discussed with ICU care team. Patient remained critical and at risk for life threatening decompensation.     By signing my name below, IJanina Tiffany, attest that this documentation has been prepared under the direction and in the presence of CLAUDIA Goldstein   Electronically signed: Emily Roa Scribe, 21 @ 08:37    Cristóbal STANLEY PA , personally performed the services described in this documentation. all medical record entries made by the scribe were at my direction and in my presence. I have reviewed the chart and agree that the record reflects my personal performance and is accurate and complete  Electronically signed: CLAUDIA Goldstein        RICKY HAMPTON  MRN-[id number]  Patient is a 66y old  Male who presents with a chief complaint of Anemia, Supratherapeutic INR, Dark Stools (23 Sep 2021 16:35)    HPI:  65M PMH ACC/AHA stage D HF due to NICM HM2 LVAD , TV annuloplasty ring 17 as destination therapy due to severe peripheral artery disease with significant stenosis  SIADH, Depression, CKD-3 with hyperkalemia, past E. coli UTIs, driveline drainage (21) and COVID-19 (back in 2020)  He was recently seen in clinic where he complained of abdominal pain and dark stools w constipation back in May. He presents to [facility] ER today weakness and fatigue, moderate and + Black stools for three days, on coumadin secondary to warfarin use in the setting of an LVAD. Patient has required transfusions for GIB in the past. Mostly recently back in 2021 pt had anemia with dark stools. No interventions was done at that time. However Last Endoscopy was done in 2020 (negative). Today labs show patient is anemic with H/H of 4.5/16.3,. INR is 8.84 MAP in the 90s, Temp 35.1. He denies any chest pain, shortness of breath, dizziness, abd pain, nausea or vomiting.       (2021 16:57)      Surgery/Hospital Course:   admit for melena w/ anemia, INR 8.84   6/15 Capsul study (+) for small bowel bleed, balloon endoscopy (old blood in prox ileum); post EGD - septic w/ L opacity, re-intubated for concern for aspiration, TTE (Mod MR, decrease biV w/ interventricular septum boweing towards R)   bronch    +C Diff    CT C/A/P: Fluid filled colon which may be 2/2 rapid transit. Small bilateral pleffs with associates. Compressive atelectasis New ISABELLE & LLL  parenchymal opacities, suspicious for pneumonia. 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    #8 Shiley trach at bedside    LVAD speed increased to 9200   Bronch   TC since . Patient transferred to SDU.    INR today 2.64.  H&H 7.3/24 this AM.  Will repeat CBC at noon, and will send stool guaiac Patient with persistent abdominal tenderness, rate of tube feeds decreased.  No nausea/vomiting.     INR today 2.4. H&H 9.1/28.6 low flow overnight /N&V, refusing Tube feeds on D5 1/2 normal  @50 cc/hr   INR 2.69  H&H 7.7/.1 refusing Tube feeds on D5 1/2 normal  @50 cc/hr. This am + BM Melena Dr Oneill HF  aware- PRBC x1  GI team consulted -  NPO  plan on study in am-  D/w Dr Cadet Patient  to return to CTU for further management; 1PRBCS    Post op INR 2.2 today.  No bleeding. BC + for SM.  Pt is hypotensive requiring pressor and inotropic support.  ID follow up today on Cefepime will follow.   R PTC for PTX    CT C/A/P: sub q emphysema in R chest wall, GGO RUL, small ascites CTH negative; Abd US: GB thickening, pericholecystic fluid     Perchole drain in place continues to drain total output overnight 133.  Fever today 38.8    duplex LE negative    Patient with persistent abdominal pain, refusing tube feeds and medications, Psych consulted   CTA A/P ordered to r/o mesenteric ischemia 2/2 persistent anorexia, nausea, vomiting. Revealed:  Evaluation of the mesenteric vessels is limited by streak artifact from LVAD. There appears to be severe stenosis of the proximal SMA; abdominal mesenteric doppler is recommended for further evaluation. 2.  No small bowel findings to suggest acute mesenteric ischemia. 3.  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  8/15: Cultures resulted BC 1/2 +GPC in clusters, SC enterobacter; mesenteric duplex: borderline stenosis of proximal SMA  : CT C/A/P noncon: Nondular opacities in R lung apex w/cavitation, abd nl  :  Continue current care, treatment of thyrotoxicosis with medications as per endocrine, d/c ABX as per team.    RUQ sono: Contracted gallbladder with cholecystostomy tube in place.  9/10 failed SMA stent, L fem PSA, s/p 3U PRCB   CTA Abd/Pelvis L RP hematoma    Trach decannulated     Today:  - Trach decannulated yesterday, stoma already closing, no respiratory distress  - LDH uptrending concerning for LVAD pump thrombosis, Heparin drip restarted with low PTT goal 40-50  - Increasing WBC count, empiric Zosyn restarted w/ PO Vanco for C. Diff ppx  - S&S re-assessed today, plan for FEES monday    REVIEW OF SYSTEMS:  Unable to obtain, pt had Trach decannulated on       ICU Vital Signs Last 24 Hrs  T(C): 36.7 (24 Sep 2021 08:00), Max: 36.7 (24 Sep 2021 08:00)  T(F): 98 (24 Sep 2021 08:00), Max: 98 (24 Sep 2021 08:00)  HR: 101 (24 Sep 2021 08:00) (88 - 112)  BP: --  BP(mean): --  ABP: 91/66 (24 Sep 2021 08:00) (77/62 - 102/68)  ABP(mean): 78 (24 Sep 2021 08:00) (66 - 84)  RR: 34 (24 Sep 2021 08:00) (19 - 42)  SpO2: 100% (24 Sep 2021 08:00) (97% - 100%)      Physical Exam:  Gen:  Awake, alert, NAD  CNS:  intact, nonfocal, responds to all commands  Neck: no JVD, gauze over trach wound  RES : coarse breath sounds, no wheezing    CVS: +LVAD hum   Abd: Soft. Sybil drain with bilious fluid. Positive BS throughout. Mild RUQ abdominal tenderness by perc sybil.  Skin: No rash, erythema, cyanosis.  Vasc: Warm and well-perfused.  Ext:  no edema      ============================I/O===========================   I&O's Detail    23 Sep 2021 07:01  -  24 Sep 2021 07:00  --------------------------------------------------------  IN:    Enteral Tube Flush: 110 mL    Miscellaneous Tube Feedin mL    sodium chloride 0.9%: 110 mL  Total IN: 1455 mL    OUT:    Drain (mL): 200 mL    Stool (mL): 1 mL    Voided (mL): 1100 mL  Total OUT: 1301 mL    Total NET: 154 mL      24 Sep 2021 07:01  -  24 Sep 2021 08:37  --------------------------------------------------------  IN:    Miscellaneous Tube Feedin mL    sodium chloride 0.9%: 5 mL  Total IN: 65 mL    OUT:  Total OUT: 0 mL    Total NET: 65 mL        ============================ LABS =========================                        10.4   12.51 )-----------( 405      ( 24 Sep 2021 00:46 )             33.9         133<L>  |  97  |  22  ----------------------------<  149<H>  4.9   |  26  |  0.38<L>    Ca    9.7      24 Sep 2021 00:46  Phos  3.5       Mg     2.0         TPro  8.8<H>  /  Alb  3.1<L>  /  TBili  0.8  /  DBili  x   /  AST  50<H>  /  ALT  47<H>  /  AlkPhos  288<H>      LIVER FUNCTIONS - ( 24 Sep 2021 00:46 )  Alb: 3.1 g/dL / Pro: 8.8 g/dL / ALK PHOS: 288 U/L / ALT: 47 U/L / AST: 50 U/L / GGT: x             ABG - ( 24 Sep 2021 00:41 )  pH, Arterial: 7.39  pH, Blood: x     /  pCO2: 52    /  pO2: 129   / HCO3: 32    / Base Excess: 5.5   /  SaO2: 100.0               ======================Micro/Rad/Cardio=================  Culture: Reviewed   CXR: Reviewed  Echo:Reviewed  ======================================================  PAST MEDICAL & SURGICAL HISTORY:  CHF (congestive heart failure)    CAD (coronary artery disease)    Depression    Pleural effusion    History of  novel coronavirus disease (COVID-19)  2020    Hemorrhoids    Bleeding hemorrhoids    Peripheral arterial disease    Claudication    BPH with urinary obstruction    ACC/AHA stage D systolic heart failure    Anticoagulation goal of INR 2.0 to 2.5    Falls    Clavicle fracture    CKD (chronic kidney disease), stage III    Iron deficiency anemia    H/O epistaxis    Vertigo    GI bleed    S/P TVR (tricuspid valve repair)    S/P ventricular assist device    S/P endoscopy      ====================ASSESSMENT ==============  Stage D Nonischemic Cardiomyopathy, Status Post HM2 on 2017    Cardiogenic shock  Hemodynamic instability   Acute hypoxemic respiratory failure s/p trach   GI bleed , Status Post Enteroscopy   Anemia, in setting of melena   Chronic Kidney Disease  Stress hyperglycemia   C.diff positive on    Hypovolemic shock  Septic shock  Leukocytosis  GB thickening/percholecystic s/p perc choley by IT   SMA stenosis  Serratia/citrobacter pneumonia   Stenotrophomonas pneumonia   Enterobacter pneumonia   Nasuea/vomiting  Deconditioning  Hyponatremia    Plan:  ====================== NEUROLOGY=====================  Nonfocal, continue to monitor neuro status    Continue ambulation as tolerated   Tylenol PRN for analgesia   C/w mirtazapine and sertraline for depression  Deconditioned, PT/Ambulate as tolerated    acetaminophen   Tablet .. 650 milliGRAM(s) Oral every 6 hours PRN Mild Pain (1 - 3)  mirtazapine 7.5 milliGRAM(s) Oral daily  sertraline 100 milliGRAM(s) Oral daily  ==================== RESPIRATORY======================  Acute hypoxemic respiratory failure s/p #8 shiley trach on   Tracheostomy decannulated  without issue, on 4L NC, monitor  Continue close monitoring of respiratory rate and breathing pattern, following of ABG’s with A-line monitoring, continuous pulse oximetry monitoring.     ====================CARDIOVASCULAR==================  Stage D Nonischemic Cardiomyopathy, Status Post HM2 on 2017; LVAD settings 9200, flow 5.7  TTE : reveals at least moderate MR, mild AI, severe global LV syst dysfxn, dec RV fxn   Propranolol for rate control of HR 2/2 Hyperthyroidism, titrate as tolerated per Endo  Continue invasive hemodynamic monitoring     SQH restarted for DVT prophylaxiss) Oral every 8 hours  ===================HEMATOLOGIC/ONC ===================  CTA A/P on  +L RP hematoma   Acute blood loss anemia, monitor H&H/Plts    Holding coumadin and ASA given recurrent GI bleeding  Heparin drip to prevent LVAD pump thrombosis    heparin   Infusion  ===================== RENAL =========================  Continue monitoring urine output, I&OS, BUN/Cr   Euvolemic, even fluid balance, currently off diuretics.    Replete lytes PRN. Keep K> 4 and Mg >2   ==================== GASTROINTESTINAL===================  S/p cholecystostomy tube placed on  with IR  Tolerating TFs @ 55cc/hr, increase 5cc/day for goal 65cc/hr as tolerated   CTA A/P : Evaluation of the mesenteric vessels is limited by streak artifact from LVAD. There appears to be severe stenosis of the proximal SMA; abdominal mesenteric doppler is recommended for further evaluation. 2.  No small bowel findings to suggest acute mesenteric ischemia. 3.  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  Mesenteric duplex on 8/15 borderline stenosis of proximal SMA, s/p failed SMA stent, L fem RP hematoma on 9/10, s/p 3U PRBC.   Reglan for gut motility  Zofran PRN nausea/vomiting  No intervention for cholecystostomy tube, PEJ  S&S eval, ice chips PRN, FEES Monday    multivitamin 1 Tablet(s) Oral daily  pantoprazole  Injectable 40 milliGRAM(s) IV Push every 12 hours  simethicone 80 milliGRAM(s) Chew every 8 hours PRN Gas  sodium chloride 0.9% lock flush 10 milliLiter(s) IV Push every 1 hour PRN Pre/post blood products, medications, blood draw, and to maintain line patency  sodium chloride 0.9%. 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 - Free T4 remains elevated   Per endocrine      - Thyroid US when trach is out      - Propanolol as above for optimal T4-->T3 conversion - titrate as tolerated       - c/w Methimazole       - s/p hydrocortisone    dextrose 40% Gel 15 Gram(s) Oral once  dextrose 50% Injectable 25 Gram(s) IV Push once  dextrose 50% Injectable 12.5 Gram(s) IV Push once  glucagon  Injectable 1 milliGRAM(s) IntraMuscular once  insulin lispro (ADMELOG) corrective regimen sliding scale   SubCutaneous every 6 hours  methimazole 10 milliGRAM(s) Oral every 8 hours  ========================INFECTIOUS DISEASE================  Afebrile, WBC rising 11.78 --> 12.51   Continue trending WBC and monitoring fever curve  Zosyn added empirically and PO Vanco for possibly recurrent aspiration PNA    Patient requires continuous monitoring with bedside rhythm monitoring, pulse ox monitoring, and intermittent blood gas analysis. Care plan discussed with ICU care team. Patient remained critical and at risk for life threatening decompensation.     By signing my name below, IJanina Tiffany, attest that this documentation has been prepared under the direction and in the presence of CLAUDIA Goldstein   Electronically signed: Emily Roa Scribe, 21 @ 08:37    I, CLAUDIA Goldstein , personally performed the services described in this documentation. all medical record entries made by the luisibmel were at my direction and in my presence. I have reviewed the chart and agree that the record reflects my personal performance and is accurate and complete  Electronically signed: CLAUDIA Goldstein

## 2021-09-24 NOTE — PROGRESS NOTE ADULT - ASSESSMENT
67 YO M with a history of stage D NICM s/p HM2 on 9/2017 as DT (due to severe PAD) with TV ring, prior COVID-19 infection 4/2020, recurrent syncope post LVAD s/p ILR, and chronic abdominal pain with prior negative workup who was admitted 6/14/21 with symptomatic anemia with Hgb 4.5 in setting of INR 8.8 without hemodynamic instability and has since had a protracted hospitalization. He was transfused and underwent VCE which showed active bleeding in the mid small bowel but subsequent enteroscopy 6/15 did not reveal any active bleeding. He acutely decompensated after procedure with fever/hypertension, low flow alarms, and pulmonary infiltrate with hypoxia requiring intubation from probable aspiration PNA. He was unable to extubated and has since undergone tracheostomy but tolerating persistent trach collar and nearing ability for decannulation. 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 Short term goal is maintenance of adequate nutrition and eventual trach decannulation.     Underwent mesenteric angiogram on 9/10 with vascular surgery. Found to have SMA stenosis at its origin but unable to place stent. Intra-op received 3uPRBC and required fem stop placement for L pseudoaneurysm. Post-op continued to have drop in H/H requiring additional 2u PRBC (last on 9/12), CT angio revealed left-sided retroperitoneal hematoma. Most recent sputum culture is positive for serratia marcescens, s/p course of meropenum. He remains frail and deconditioned, will continue to work with PT and advance tube feeds as tolerated.          67 YO M with a history of stage D NICM s/p HM2 on 9/2017 as DT (due to severe PAD) with TV ring, prior COVID-19 infection 4/2020, recurrent syncope post LVAD s/p ILR, and chronic abdominal pain with prior negative workup who was admitted 6/14/21 with symptomatic anemia with Hgb 4.5 in setting of INR 8.8 without hemodynamic instability and has since had a protracted hospitalization. He was transfused and underwent VCE which showed active bleeding in the mid small bowel but subsequent enteroscopy 6/15 did not reveal any active bleeding. He acutely decompensated after procedure with fever/hypertension, low flow alarms, and pulmonary infiltrate with hypoxia requiring intubation from probable aspiration PNA. He was unable to extubated and has since undergone tracheostomy but tolerating persistent trach collar and nearing ability for decannulation. 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 He underwent angiogram on 9/10, stent was unable to be placed and course was then complicated by RP bleed. He continued to have persistent leukocytosis and febrile episodes and was noted to have positive sputum culture for serratia marcescens. Since stopping his antibiotics his leukocytosis has worsened, remains afebrile however will be placed on IV Zosyn. Low threshold to repeat cultures and scan patient.     He is now decannulated (trach removed 9/23) and saturating on nasal cannula. He remains frail and deconditioned, will continue to work with PT and advance tube feeds as tolerated.

## 2021-09-24 NOTE — PROGRESS NOTE ADULT - PROBLEM SELECTOR PLAN 4
- ID following and appreciate recommendations   - serratia bacteremia and poss acalculous cholecystitis. B/c with gram + cocci and many enterobacter Carbapenem resistant strain and now s/p per dawn drain  - also with enterobacter from sputum culture 8/13  - sputum cx 9/14 positive for serratia marcescens  - Blood Cultures negative  - s/p course of Meropenum  - WBC slowly up-trending, however remains afebrile. Will be placed on Zosyn today. If continues to rise would repeat cultures and scan patient.

## 2021-09-25 NOTE — PROGRESS NOTE ADULT - PROBLEM SELECTOR PLAN 6
Endo following  Continue Propranolol 40 mg q8h for rate control & titrate as tolerated as per Endo   Continue Methimazole 10 mg q8h   Repeat TFTs 9/28 as per Endo

## 2021-09-25 NOTE — PROGRESS NOTE ADULT - SUBJECTIVE AND OBJECTIVE BOX
Subjective:    Telemetry:      Vital Signs Last 24 Hrs  T(C): 36.4 (21 @ 16:31), Max: 36.5 (21 @ 08:00)  T(F): 97.6 (21 @ 16:31), Max: 97.7 (21 @ 08:00)  HR: 123 (21 @ 16:31) (104 - 130)  BP: 103/62 (21 @ 16:31) (103/62 - 103/62)  RR: 24 (21 @ 16:31) (22 - 49)  SpO2: 100% (21 @ 16:31) (97% - 100%)                    @ 07:01  -   @ 07:00  --------------------------------------------------------  IN: 510.5 mL / OUT: 1150 mL / NET: -639.5 mL     @ 07:01  -   @ 17:34  --------------------------------------------------------  IN: 1020 mL / OUT: 360 mL / NET: 660 mL          Daily     Daily Weight in k.7 (25 Sep 2021 00:00)        CAPILLARY BLOOD GLUCOSE      POCT Blood Glucose.: 105 mg/dL (25 Sep 2021 15:53)  POCT Blood Glucose.: 143 mg/dL (25 Sep 2021 11:51)  POCT Blood Glucose.: 172 mg/dL (25 Sep 2021 05:40)  POCT Blood Glucose.: 147 mg/dL (25 Sep 2021 00:15)          Drains:     MS         [  ] Drainage:                 L Pleural  [  ]  Drainage:                R Pleural  [  ]  Drainage:    Pacing Wires        [  ]   Settings:                                  Isolated  [  ]    Coumadin    [ ] YES          [  ]      NO         Reason:                                 10.3   13.58 )-----------( 418      ( 25 Sep 2021 00:28 )             31.6           130<L>  |  94<L>  |  24<H>  ----------------------------<  158<H>  4.5   |  26  |  0.39<L>    Ca    9.8      25 Sep 2021 00:28  Phos  3.4       Mg     1.8         TPro  8.7<H>  /  Alb  3.2<L>  /  TBili  0.8  /  DBili  x   /  AST  43<H>  /  ALT  49<H>  /  AlkPhos  291<H>        PTT - ( 25 Sep 2021 14:45 )  PTT:29.7 sec    PHYSICAL EXAM:    Neurology: alert and oriented x 3, nonfocal, no gross deficits    CV:    Sternal Wound: CDI, Stable    Lungs:    Abdomen: soft, nontender, nondistended, (+) bowel sounds, (+) BM    :               Extremities:               acetaminophen   Tablet .. 650 milliGRAM(s) Oral every 6 hours PRN  chlorhexidine 0.12% Liquid 15 milliLiter(s) Oral Mucosa two times a day  chlorhexidine 2% Cloths 1 Application(s) Topical <User Schedule>  dextrose 40% Gel 15 Gram(s) Oral once  dextrose 50% Injectable 25 Gram(s) IV Push once  dextrose 50% Injectable 12.5 Gram(s) IV Push once  glucagon  Injectable 1 milliGRAM(s) IntraMuscular once  heparin  Infusion 500 Unit(s)/Hr IV Continuous <Continuous>  insulin lispro (ADMELOG) corrective regimen sliding scale   SubCutaneous every 6 hours  lidocaine   4% Patch 1 Patch Transdermal daily  methimazole 10 milliGRAM(s) Oral every 8 hours  metoclopramide Injectable 10 milliGRAM(s) IV Push every 8 hours  mirtazapine 7.5 milliGRAM(s) Oral daily  multivitamin 1 Tablet(s) Oral daily  ondansetron Injectable 4 milliGRAM(s) IV Push every 6 hours PRN  pantoprazole  Injectable 40 milliGRAM(s) IV Push every 12 hours  piperacillin/tazobactam IVPB.. 3.375 Gram(s) IV Intermittent every 8 hours  propranolol 40 milliGRAM(s) Oral every 8 hours  sertraline 100 milliGRAM(s) Oral daily  simethicone 80 milliGRAM(s) Chew every 8 hours PRN  sodium chloride 0.9% lock flush 10 milliLiter(s) IV Push every 1 hour PRN  sodium chloride 0.9%. 1000 milliLiter(s) IV Continuous <Continuous>  thiamine 100 milliGRAM(s) Oral daily  vancomycin    Solution 125 milliGRAM(s) Oral every 12 hours                    Physical Therapy Rec:   Home  [  ]   Home w/ PT  [  ]  Rehab  [  ]    Discussed with Cardiothoracic Team at AM rounds. Subjective: Unable to obtain - pt had trach decannulated , not verbalizing but nods head to yes/no questions    Telemetry:      Vital Signs Last 24 Hrs  T(C): 36.4 (21 @ 16:31), Max: 36.5 (21 @ 08:00)  T(F): 97.6 (21 @ 16:31), Max: 97.7 (21 @ 08:00)  HR: 123 (21 @ 16:31) (104 - 130)  BP: 103/62 (21 @ 16:31) (103/62 - 103/62)  RR: 24 (21 @ 16:31) (22 - 49)  SpO2: 100% (21 @ 16:31) (97% - 100%)              @ 07:01  -   @ 07:00  --------------------------------------------------------  IN: 510.5 mL / OUT: 1150 mL / NET: -639.5 mL     @ 07:01  -   @ 17:34  --------------------------------------------------------  IN: 1020 mL / OUT: 360 mL / NET: 660 mL      Daily Weight in k.7 (25 Sep 2021 00:00)      POCT Blood Glucose.: 105 mg/dL (25 Sep 2021 15:53)  POCT Blood Glucose.: 143 mg/dL (25 Sep 2021 11:51)  POCT Blood Glucose.: 172 mg/dL (25 Sep 2021 05:40)  POCT Blood Glucose.: 147 mg/dL (25 Sep 2021 00:15)          Drains:     Dawn drain        [  ] Drainage:                  Coumadin    [ ] YES          [ x ]      NO         Reason: h/o GI bleeding                               10.3   13.58 )-----------( 418      ( 25 Sep 2021 00:28 )             31.6         130<L>  |  94<L>  |  24<H>  ----------------------------<  158<H>  4.5   |  26  |  0.39<L>    Ca    9.8      25 Sep 2021 00:28  Phos  3.4       Mg     1.8         TPro  8.7<H>  /  Alb  3.2<L>  /  TBili  0.8  /  DBili  x   /  AST  43<H>  /  ALT  49<H>  /  AlkPhos  291<H>      PTT - ( 25 Sep 2021 14:45 )  PTT:29.7 sec      PHYSICAL EXAM:    Neurology: alert and oriented x 3, nonfocal, no gross deficits    HEENT: (+) R nare w/ kaofeed in place     CV: (+) VAD hum     Sternal Wound: prior MSI well healed, Stable    Lungs: coarse breath sounds b/l, no wheezes    Abdomen: (+) driveline site w/ dressing in place, (+) RUQ dawn drain w/ bilious drainage, soft, mildly tender in RUQ around perc dawn site, nondistended, (+) bowel sounds, (+) BM - last     : voiding freely                Extremities: (+) L axillary nabil, (+) LUE midline, (+) dopplerable pulses b/l LE, warm and well perfused distally, no LE edema, BLANDON               acetaminophen   Tablet .. 650 milliGRAM(s) Oral every 6 hours PRN  chlorhexidine 0.12% Liquid 15 milliLiter(s) Oral Mucosa two times a day  heparin  Infusion 500 Unit(s)/Hr IV Continuous <Continuous>  insulin lispro (ADMELOG) corrective regimen sliding scale SubCutaneous every 6 hours  lidocaine 4% Patch 1 Patch Transdermal daily  methimazole 10 milliGRAM(s) Oral every 8 hours  metoclopramide Injectable 10 milliGRAM(s) IV Push every 8 hours  mirtazapine 7.5 milliGRAM(s) Oral daily  multivitamin 1 Tablet(s) Oral daily  ondansetron Injectable 4 milliGRAM(s) IV Push every 6 hours PRN  pantoprazole Injectable 40 milliGRAM(s) IV Push every 12 hours  piperacillin/tazobactam IVPB.. 3.375 Gram(s) IV Intermittent every 8 hours  propranolol 40 milliGRAM(s) Oral every 8 hours  sertraline 100 milliGRAM(s) Oral daily  simethicone 80 milliGRAM(s) Chew every 8 hours PRN  thiamine 100 milliGRAM(s) Oral daily  vancomycin Solution 125 milliGRAM(s) Oral every 12 hours       Physical Therapy Rec:   Home  [  ]   Home w/ PT  [ x ]  Rehab  [  ]    Discussed with Cardiothoracic Team at AM rounds. Subjective: Unable to obtain - pt had trach decannulated , not verbalizing but nods head to yes/no questions    Telemetry: ST 120s-130s     Vital Signs Last 24 Hrs  T(C): 36.4 (21 @ 16:31), Max: 36.5 (21 @ 08:00)  T(F): 97.6 (21 @ 16:31), Max: 97.7 (21 @ 08:00)  HR: 123 (21 @ 16:31) (104 - 130)  BP: 103/62 (21 @ 16:31) (103/62 - 103/62)  RR: 24 (21 @ 16:31) (22 - 49)  SpO2: 100% (21 @ 16:31) (97% - 100%)              @ 07:01  -   @ 07:00  --------------------------------------------------------  IN: 510.5 mL / OUT: 1150 mL / NET: -639.5 mL     @ 07:01  -   @ 17:34  --------------------------------------------------------  IN: 1020 mL / OUT: 360 mL / NET: 660 mL      Daily Weight in k.7 (25 Sep 2021 00:00)      POCT Blood Glucose.: 105 mg/dL (25 Sep 2021 15:53)  POCT Blood Glucose.: 143 mg/dL (25 Sep 2021 11:51)  POCT Blood Glucose.: 172 mg/dL (25 Sep 2021 05:40)  POCT Blood Glucose.: 147 mg/dL (25 Sep 2021 00:15)          Drains:     Dawn drain        [ x ] Drainage: 260cc today                 Coumadin    [ ] YES          [ x ]      NO         Reason: h/o GI bleeding                               10.3   13.58 )-----------( 418      ( 25 Sep 2021 00:28 )             31.6         130<L>  |  94<L>  |  24<H>  ----------------------------<  158<H>  4.5   |  26  |  0.39<L>    Ca    9.8      25 Sep 2021 00:28  Phos  3.4       Mg     1.8         TPro  8.7<H>  /  Alb  3.2<L>  /  TBili  0.8  /  DBili  x   /  AST  43<H>  /  ALT  49<H>  /  AlkPhos  291<H>      PTT - ( 25 Sep 2021 14:45 )  PTT:29.7 sec      PHYSICAL EXAM:    Neurology: alert and oriented x 3, nonfocal, no gross deficits    HEENT: (+) R nare w/ kaofeed in place     CV: (+) VAD hum     Sternal Wound: prior MSI well healed, Stable    Lungs: coarse breath sounds b/l, no wheezes    Abdomen: (+) driveline site w/ dressing in place, (+) RUQ dawn drain w/ bilious drainage, soft, mildly tender in RUQ around perc dawn site, nondistended, (+) bowel sounds, (+) BM - last     : voiding freely                Extremities: (+) L axillary nabil, (+) LUE midline, (+) dopplerable pulses b/l LE, warm and well perfused distally, no LE edema, BLANDON           acetaminophen   Tablet .. 650 milliGRAM(s) Oral every 6 hours PRN  chlorhexidine 0.12% Liquid 15 milliLiter(s) Oral Mucosa two times a day  heparin  Infusion 500 Unit(s)/Hr IV Continuous <Continuous>  insulin lispro (ADMELOG) corrective regimen sliding scale SubCutaneous every 6 hours  lidocaine 4% Patch 1 Patch Transdermal daily  methimazole 10 milliGRAM(s) Oral every 8 hours  metoclopramide Injectable 10 milliGRAM(s) IV Push every 8 hours  mirtazapine 7.5 milliGRAM(s) Oral daily  multivitamin 1 Tablet(s) Oral daily  ondansetron Injectable 4 milliGRAM(s) IV Push every 6 hours PRN  pantoprazole Injectable 40 milliGRAM(s) IV Push every 12 hours  piperacillin/tazobactam IVPB.. 3.375 Gram(s) IV Intermittent every 8 hours  propranolol 40 milliGRAM(s) Oral every 8 hours  sertraline 100 milliGRAM(s) Oral daily  simethicone 80 milliGRAM(s) Chew every 8 hours PRN  thiamine 100 milliGRAM(s) Oral daily  vancomycin Solution 125 milliGRAM(s) Oral every 12 hours       Physical Therapy Rec:   Home  [  ]   Home w/ PT  [ x ]  Rehab  [  ]    Discussed with Cardiothoracic Team at AM rounds.

## 2021-09-25 NOTE — PROGRESS NOTE ADULT - PROBLEM SELECTOR PLAN 1
Admitted for melena w/ anemia & supratherapeutic INR 6/14   Capsule study 6/15 (+) for small bowel bleed   Holding ASA, DVT ppx, and Coumadin 2/2 GIB   Daily CBC to monitor H/H

## 2021-09-25 NOTE — PROGRESS NOTE ADULT - SUBJECTIVE AND OBJECTIVE BOX
RICKY HAMPTON  MRN-[id number]  Patient is a 66y old  Male who presents with a chief complaint of Anemia, Supratherapeutic INR, Dark Stools (24 Sep 2021 18:34)    HPI:  65M PMH ACC/AHA stage D HF due to NICM HM2 LVAD , TV annuloplasty ring 17 as destination therapy due to severe peripheral artery disease with significant stenosis  SIADH, Depression, CKD-3 with hyperkalemia, past E. coli UTIs, driveline drainage (21) and COVID-19 (back in 2020)  He was recently seen in clinic where he complained of abdominal pain and dark stools w constipation back in May. He presents to [facility] ER today weakness and fatigue, moderate and + Black stools for three days, on coumadin secondary to warfarin use in the setting of an LVAD. Patient has required transfusions for GIB in the past. Mostly recently back in 2021 pt had anemia with dark stools. No interventions was done at that time. However Last Endoscopy was done in 2020 (negative). Today labs show patient is anemic with H/H of 4.5/16.3,. INR is 8.84 MAP in the 90s, Temp 35.1. He denies any chest pain, shortness of breath, dizziness, abd pain, nausea or vomiting.       (2021 16:57)      Surgery/Hospital Course:   admit for melena w/ anemia, INR 8.84   6/15 Capsul study (+) for small bowel bleed, balloon endoscopy (old blood in prox ileum); post EGD - septic w/ L opacity, re-intubated for concern for aspiration, TTE (Mod MR, decrease biV w/ interventricular septum boweing towards R)   bronch    +C Diff    CT C/A/P: Fluid filled colon which may be 2/2 rapid transit. Small bilateral pleffs with associates. Compressive atelectasis New ISABELLE & LLL  parenchymal opacities, suspicious for pneumonia. 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    #8 Shiley trach at bedside    LVAD speed increased to 9200   Bronch   TC since . Patient transferred to SDU.    INR today 2.64.  H&H 7.3/24 this AM.  Will repeat CBC at noon, and will send stool guaiac Patient with persistent abdominal tenderness, rate of tube feeds decreased.  No nausea/vomiting.     INR today 2.4. H&H 9.1/28.6 low flow overnight /N&V, refusing Tube feeds on D5 1/2 normal  @50 cc/hr   INR 2.69  H&H 7.7/.1 refusing Tube feeds on D5 1/2 normal  @50 cc/hr. This am + BM Melena Dr Oneill HF  aware- PRBC x1  GI team consulted -  NPO  plan on study in am-  D/w Dr Cadet Patient  to return to CTU for further management; 1PRBCS    Post op INR 2.2 today.  No bleeding. BC + for SM.  Pt is hypotensive requiring pressor and inotropic support.  ID follow up today on Cefepime will follow.   R PTC for PTX    CT C/A/P: sub q emphysema in R chest wall, GGO RUL, small ascites CTH negative; Abd US: GB thickening, pericholecystic fluid     Perchole drain in place continues to drain total output overnight 133.  Fever today 38.8    duplex LE negative    Patient with persistent abdominal pain, refusing tube feeds and medications, Psych consulted   CTA A/P ordered to r/o mesenteric ischemia 2/2 persistent anorexia, nausea, vomiting. Revealed:  Evaluation of the mesenteric vessels is limited by streak artifact from LVAD. There appears to be severe stenosis of the proximal SMA; abdominal mesenteric doppler is recommended for further evaluation. 2.  No small bowel findings to suggest acute mesenteric ischemia. 3.  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  8/15: Cultures resulted BC 1/2 +GPC in clusters, SC enterobacter; mesenteric duplex: borderline stenosis of proximal SMA  : CT C/A/P noncon: Nondular opacities in R lung apex w/cavitation, abd nl  :  Continue current care, treatment of thyrotoxicosis with medications as per endocrine, d/c ABX as per team.    RUQ sono: Contracted gallbladder with cholecystostomy tube in place.  9/10 failed SMA stent, L fem PSA, s/p 3U PRCB   CTA Abd/Pelvis L RP hematoma    Trach decannulated    Trach decannulated    Today:  Patient finally feeds at goal, continue as tolerated. FEES on Monday will f/u.      REVIEW OF SYSTEMS:  Unable to obtain, pt had Trach decannulated on     ICU Vital Signs Last 24 Hrs  T(C): 36.5 (25 Sep 2021 08:00), Max: 36.5 (25 Sep 2021 08:00)  T(F): 97.7 (25 Sep 2021 08:00), Max: 97.7 (25 Sep 2021 08:00)  HR: 108 (25 Sep 2021 12:00) (99 - 130)  BP: --  BP(mean): --  ABP: 86/68 (25 Sep 2021 12:00) (76/61 - 99/83)  ABP(mean): 77 (25 Sep 2021 12:00) (69 - 91)  RR: 41 (25 Sep 2021 12:00) (22 - 49)  SpO2: 100% (25 Sep 2021 12:00) (97% - 100%)      Physical Exam:  Gen:  Awake, alert, NAD  CNS:  intact, nonfocal, responds to all commands  Neck: no JVD, gauze over trach wound  RES : coarse breath sounds, no wheezing    CVS: +LVAD hum   Abd: Soft. Sybil drain with bilious fluid. Positive BS throughout. Mild RUQ abdominal tenderness by perc sybil.  Skin: No rash, erythema, cyanosis.  Vasc: Warm and well-perfused.  Ext:  no edema  ============================I/O===========================   I&O's Detail    24 Sep 2021 07:01  -  25 Sep 2021 07:00  --------------------------------------------------------  IN:    Heparin: 105.5 mL    IV PiggyBack: 225 mL    Miscellaneous Tube Feedin mL    sodium chloride 0.9%: 60 mL  Total IN: 510.5 mL    OUT:    Drain (mL): 550 mL    Voided (mL): 600 mL  Total OUT: 1150 mL    Total NET: -639.5 mL      25 Sep 2021 07:01  -  25 Sep 2021 12:35  --------------------------------------------------------  IN:    Heparin: 25 mL    IV PiggyBack: 100 mL    Miscellaneous Tube Feedin mL  Total IN: 450 mL    OUT:    Drain (mL): 100 mL    Voided (mL): 200 mL  Total OUT: 300 mL    Total NET: 150 mL        ============================ LABS =========================                        10.3   13.58 )-----------( 418      ( 25 Sep 2021 00:28 )             31.6         130<L>  |  94<L>  |  24<H>  ----------------------------<  158<H>  4.5   |  26  |  0.39<L>    Ca    9.8      25 Sep 2021 00:28  Phos  3.4       Mg     1.8         TPro  8.7<H>  /  Alb  3.2<L>  /  TBili  0.8  /  DBili  x   /  AST  43<H>  /  ALT  49<H>  /  AlkPhos  291<H>      LIVER FUNCTIONS - ( 25 Sep 2021 00:28 )  Alb: 3.2 g/dL / Pro: 8.7 g/dL / ALK PHOS: 291 U/L / ALT: 49 U/L / AST: 43 U/L / GGT: x           PTT - ( 25 Sep 2021 07:57 )  PTT:34.5 sec  ABG - ( 25 Sep 2021 00:23 )  pH, Arterial: 7.42  pH, Blood: x     /  pCO2: 49    /  pO2: 140   / HCO3: 32    / Base Excess: 6.4   /  SaO2: 99.8                ======================Micro/Rad/Cardio=================  Culture: Reviewed   CXR: Reviewed  Echo:Reviewed  ======================================================  PAST MEDICAL & SURGICAL HISTORY:  CHF (congestive heart failure)    CAD (coronary artery disease)    Depression    Pleural effusion    History of  novel coronavirus disease (COVID-19)  2020    Hemorrhoids    Bleeding hemorrhoids    Peripheral arterial disease    Claudication    BPH with urinary obstruction    ACC/AHA stage D systolic heart failure    Anticoagulation goal of INR 2.0 to 2.5    Falls    Clavicle fracture    CKD (chronic kidney disease), stage III    Iron deficiency anemia    H/O epistaxis    Vertigo    GI bleed    S/P TVR (tricuspid valve repair)    S/P ventricular assist device    S/P endoscopy      ====================ASSESSMENT ==============  Stage D Nonischemic Cardiomyopathy, Status Post HM2 on 2017    Cardiogenic shock  Hemodynamic instability   Acute hypoxemic respiratory failure s/p trach   GI bleed , Status Post Enteroscopy   Anemia, in setting of melena   Chronic Kidney Disease  Stress hyperglycemia   C.diff positive on    Hypovolemic shock  Septic shock  Leukocytosis  GB thickening/percholecystic s/p perc choley by IT   SMA stenosis  Serratia/citrobacter pneumonia   Stenotrophomonas pneumonia   Enterobacter pneumonia   Nasuea/vomiting  Deconditioning  Hyponatremia    Plan:  ====================== NEUROLOGY=====================  continue to monitor neuro status    Continue ambulation as tolerated   Tylenol PRN for analgesia   C/w mirtazapine and sertraline for depression  Deconditioned, PT/Ambulate as tolerated    acetaminophen   Tablet .. 650 milliGRAM(s) Oral every 6 hours PRN Mild Pain (1 - 3)  mirtazapine 7.5 milliGRAM(s) Oral daily  sertraline 100 milliGRAM(s) Oral daily    ==================== RESPIRATORY======================  cute hypoxemic respiratory failure s/p #8 shijett trach on   Tracheostomy decannulated  without issue, on 4L NC, monitor  Continue close monitoring of respiratory rate and breathing pattern, following of ABG’s with A-line monitoring, continuous pulse oximetry monitoring.     ====================CARDIOVASCULAR==================  Stage D Nonischemic Cardiomyopathy, Status Post HM2 on 2017; LVAD settings 9200, flow 5.7  TTE : reveals at least moderate MR, mild AI, severe global LV syst dysfxn, dec RV fxn   Propranolol for rate control of HR 2/2 Hyperthyroidism, titrate as tolerated per Endo  Continue invasive hemodynamic monitoring     propranolol 40 milliGRAM(s) Oral every 8 hours    ===================HEMATOLOGIC/ONC ===================  CTA A/P on  +L RP hematoma   Acute blood loss anemia, monitor H&H/Plts    Holding coumadin and ASA given recurrent GI bleeding  Heparin drip to prevent LVAD pump thrombosis    VTE prophylaxis, heparin  Infusion 500 Unit(s)/Hr (5 mL/Hr) IV Continuous <Continuous>    ===================== RENAL =========================  Continue monitoring urine output, I&OS, BUN/Cr   Euvolemic, even fluid balance, currently off diuretics.    Replete lytes PRN. Keep K> 4 and Mg >2     ==================== GASTROINTESTINAL===================  S/p cholecystostomy tube placed on  with IR  Tolerating TFs @ 55cc/hr, increase 5cc/day for goal 65cc/hr as tolerated   CTA A/P : Evaluation of the mesenteric vessels is limited by streak artifact from LVAD. There appears to be severe stenosis of the proximal SMA; abdominal mesenteric doppler is recommended for further evaluation. 2.  No small bowel findings to suggest acute mesenteric ischemia. 3.  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  Mesenteric duplex on 8/15 borderline stenosis of proximal SMA, s/p failed SMA stent, L fem RP hematoma on 9/10, s/p 3U PRBC.   Reglan for gut motility  Zofran PRN nausea/vomiting  No intervention for cholecystostomy tube, PEJ  S&S eval, ice chips PRN, FEES Monday    multivitamin 1 Tablet(s) Oral daily  GI prophylaxis, pantoprazole  Injectable 40 milliGRAM(s) IV Push every 12 hours  simethicone 80 milliGRAM(s) Chew every 8 hours PRN Gas  sodium chloride 0.9% lock flush 10 milliLiter(s) IV Push every 1 hour PRN Pre/post blood products, medications, blood draw, and to maintain line patency  sodium chloride 0.9%. 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 - Free T4 remains elevated   Per endocrine      - Thyroid US when trach is out      - Propanolol as above for optimal T4-->T3 conversion - titrate as tolerated       - c/w Methimazole       - s/p hydrocortisone    dextrose 40% Gel 15 Gram(s) Oral once  dextrose 50% Injectable 25 Gram(s) IV Push once  dextrose 50% Injectable 12.5 Gram(s) IV Push once  glucagon  Injectable 1 milliGRAM(s) IntraMuscular once  insulin lispro (ADMELOG) corrective regimen sliding scale   SubCutaneous every 6 hours  methimazole 10 milliGRAM(s) Oral every 8 hours    ========================INFECTIOUS DISEASE================  Afebrile, WBC rising 12.51 -> 13.58  Continue trending WBC and monitoring fever curve  Zosyn added empirically and PO Vanco for possibly recurrent aspiration PNA    piperacillin/tazobactam IVPB.. 3.375 Gram(s) IV Intermittent every 8 hours  vancomycin    Solution 125 milliGRAM(s) Oral every 12 hours      Patient requires continuous monitoring with bedside rhythm monitoring, pulse ox monitoring, and intermittent blood gas analysis. Care plan discussed with ICU care team. Patient remained critical and at risk for life threatening decompensation.     By signing my name below, I, Aparna Saleh, attest that this documentation has been prepared under the direction and in the presence of CLAUDIA Goldstein   Electronically signed: Cesia Villegas, 21 @ 12:35    I, Cristóbal Cisneros, personally performed the services described in this documentation. all medical record entries made by the scribmel were at my direction and in my presence. I have reviewed the chart and agree that the record reflects my personal performance and is accurate and complete  Electronically signed: CLAUDIA Goldstein        RICKY HAMPTON  MRN-[id number]  Patient is a 66y old  Male who presents with a chief complaint of Anemia, Supratherapeutic INR, Dark Stools (24 Sep 2021 18:34)    HPI:  65M PMH ACC/AHA stage D HF due to NICM HM2 LVAD , TV annuloplasty ring 17 as destination therapy due to severe peripheral artery disease with significant stenosis  SIADH, Depression, CKD-3 with hyperkalemia, past E. coli UTIs, driveline drainage (21) and COVID-19 (back in 2020)  He was recently seen in clinic where he complained of abdominal pain and dark stools w constipation back in May. He presents to [facility] ER today weakness and fatigue, moderate and + Black stools for three days, on coumadin secondary to warfarin use in the setting of an LVAD. Patient has required transfusions for GIB in the past. Mostly recently back in 2021 pt had anemia with dark stools. No interventions was done at that time. However Last Endoscopy was done in 2020 (negative). Today labs show patient is anemic with H/H of 4.5/16.3,. INR is 8.84 MAP in the 90s, Temp 35.1. He denies any chest pain, shortness of breath, dizziness, abd pain, nausea or vomiting.       (2021 16:57)      Surgery/Hospital Course:   admit for melena w/ anemia, INR 8.84   6/15 Capsul study (+) for small bowel bleed, balloon endoscopy (old blood in prox ileum); post EGD - septic w/ L opacity, re-intubated for concern for aspiration, TTE (Mod MR, decrease biV w/ interventricular septum boweing towards R)   bronch    +C Diff    CT C/A/P: Fluid filled colon which may be 2/2 rapid transit. Small bilateral pleffs with associates. Compressive atelectasis New ISABELLE & LLL  parenchymal opacities, suspicious for pneumonia. 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    #8 Shiley trach at bedside    LVAD speed increased to 9200   Bronch   TC since . Patient transferred to SDU.    INR today 2.64.  H&H 7.3/24 this AM.  Will repeat CBC at noon, and will send stool guaiac Patient with persistent abdominal tenderness, rate of tube feeds decreased.  No nausea/vomiting.     INR today 2.4. H&H 9.1/28.6 low flow overnight /N&V, refusing Tube feeds on D5 1/2 normal  @50 cc/hr   INR 2.69  H&H 7.7/.1 refusing Tube feeds on D5 1/2 normal  @50 cc/hr. This am + BM Melena Dr Oneill HF  aware- PRBC x1  GI team consulted -  NPO  plan on study in am-  D/w Dr Cadet Patient  to return to CTU for further management; 1PRBCS    Post op INR 2.2 today.  No bleeding. BC + for SM.  Pt is hypotensive requiring pressor and inotropic support.  ID follow up today on Cefepime will follow.   R PTC for PTX    CT C/A/P: sub q emphysema in R chest wall, GGO RUL, small ascites CTH negative; Abd US: GB thickening, pericholecystic fluid     Perchole drain in place continues to drain total output overnight 133.  Fever today 38.8    duplex LE negative    Patient with persistent abdominal pain, refusing tube feeds and medications, Psych consulted   CTA A/P ordered to r/o mesenteric ischemia 2/2 persistent anorexia, nausea, vomiting. Revealed:  Evaluation of the mesenteric vessels is limited by streak artifact from LVAD. There appears to be severe stenosis of the proximal SMA; abdominal mesenteric doppler is recommended for further evaluation. 2.  No small bowel findings to suggest acute mesenteric ischemia. 3.  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  8/15: Cultures resulted BC 1/2 +GPC in clusters, SC enterobacter; mesenteric duplex: borderline stenosis of proximal SMA  : CT C/A/P noncon: Nondular opacities in R lung apex w/cavitation, abd nl  :  Continue current care, treatment of thyrotoxicosis with medications as per endocrine, d/c ABX as per team.    RUQ sono: Contracted gallbladder with cholecystostomy tube in place.  9/10 failed SMA stent, L fem PSA, s/p 3U PRCB   CTA Abd/Pelvis L RP hematoma    Trach decannulated    Trach decannulated    Today:  Tube feeds at goal now, tolerating without issue. No vomiting. No respiratory distress. Heparin at 500cc/hr, not titrating for today. LDH trending down. Empiric Zosyn continued.    REVIEW OF SYSTEMS:  Unable to obtain, pt had Trach decannulated on     ICU Vital Signs Last 24 Hrs  T(C): 36.5 (25 Sep 2021 08:00), Max: 36.5 (25 Sep 2021 08:00)  T(F): 97.7 (25 Sep 2021 08:00), Max: 97.7 (25 Sep 2021 08:00)  HR: 108 (25 Sep 2021 12:00) (99 - 130)  BP: --  BP(mean): --  ABP: 86/68 (25 Sep 2021 12:00) (76/61 - 99/83)  ABP(mean): 77 (25 Sep 2021 12:00) (69 - 91)  RR: 41 (25 Sep 2021 12:00) (22 - 49)  SpO2: 100% (25 Sep 2021 12:00) (97% - 100%)      Physical Exam:  Gen:  Awake, alert, NAD  CNS:  intact, nonfocal, responds to all commands  Neck: no JVD, gauze over trach wound  RES : coarse breath sounds, no wheezing    CVS: +LVAD hum   Abd: Soft. Sybil drain with bilious fluid. Positive BS throughout. Mild RUQ abdominal tenderness by perc sybil.  Skin: No rash, erythema, cyanosis.  Vasc: Warm and well-perfused.  Ext:  no edema  ============================I/O===========================   I&O's Detail    24 Sep 2021 07:01  -  25 Sep 2021 07:00  --------------------------------------------------------  IN:    Heparin: 105.5 mL    IV PiggyBack: 225 mL    Miscellaneous Tube Feedin mL    sodium chloride 0.9%: 60 mL  Total IN: 510.5 mL    OUT:    Drain (mL): 550 mL    Voided (mL): 600 mL  Total OUT: 1150 mL    Total NET: -639.5 mL      25 Sep 2021 07:01  -  25 Sep 2021 12:35  --------------------------------------------------------  IN:    Heparin: 25 mL    IV PiggyBack: 100 mL    Miscellaneous Tube Feedin mL  Total IN: 450 mL    OUT:    Drain (mL): 100 mL    Voided (mL): 200 mL  Total OUT: 300 mL    Total NET: 150 mL        ============================ LABS =========================                        10.3   13.58 )-----------( 418      ( 25 Sep 2021 00:28 )             31.6         130<L>  |  94<L>  |  24<H>  ----------------------------<  158<H>  4.5   |  26  |  0.39<L>    Ca    9.8      25 Sep 2021 00:28  Phos  3.4       Mg     1.8         TPro  8.7<H>  /  Alb  3.2<L>  /  TBili  0.8  /  DBili  x   /  AST  43<H>  /  ALT  49<H>  /  AlkPhos  291<H>      LIVER FUNCTIONS - ( 25 Sep 2021 00:28 )  Alb: 3.2 g/dL / Pro: 8.7 g/dL / ALK PHOS: 291 U/L / ALT: 49 U/L / AST: 43 U/L / GGT: x           PTT - ( 25 Sep 2021 07:57 )  PTT:34.5 sec  ABG - ( 25 Sep 2021 00:23 )  pH, Arterial: 7.42  pH, Blood: x     /  pCO2: 49    /  pO2: 140   / HCO3: 32    / Base Excess: 6.4   /  SaO2: 99.8                ======================Micro/Rad/Cardio=================  Culture: Reviewed   CXR: Reviewed  Echo:Reviewed  ======================================================  PAST MEDICAL & SURGICAL HISTORY:  CHF (congestive heart failure)    CAD (coronary artery disease)    Depression    Pleural effusion    History of 2019 novel coronavirus disease (COVID-19)  2020    Hemorrhoids    Bleeding hemorrhoids    Peripheral arterial disease    Claudication    BPH with urinary obstruction    ACC/AHA stage D systolic heart failure    Anticoagulation goal of INR 2.0 to 2.5    Falls    Clavicle fracture    CKD (chronic kidney disease), stage III    Iron deficiency anemia    H/O epistaxis    Vertigo    GI bleed    S/P TVR (tricuspid valve repair)    S/P ventricular assist device    S/P endoscopy      ====================ASSESSMENT ==============  Stage D Nonischemic Cardiomyopathy, Status Post HM2 on 2017    Cardiogenic shock  Hemodynamic instability   Acute hypoxemic respiratory failure s/p trach   GI bleed , Status Post Enteroscopy   Anemia, in setting of melena   Chronic Kidney Disease  Stress hyperglycemia   C.diff positive on    Hypovolemic shock  Septic shock  Leukocytosis  GB thickening/percholecystic s/p perc choley by IT   SMA stenosis  Serratia/citrobacter pneumonia   Stenotrophomonas pneumonia   Enterobacter pneumonia   Nasuea/vomiting  Deconditioning  Hyponatremia    Plan:  ====================== NEUROLOGY=====================  continue to monitor neuro status    Continue ambulation as tolerated   Tylenol PRN for analgesia   C/w mirtazapine and sertraline for depression  Deconditioned, PT/Ambulate as tolerated    acetaminophen   Tablet .. 650 milliGRAM(s) Oral every 6 hours PRN Mild Pain (1 - 3)  mirtazapine 7.5 milliGRAM(s) Oral daily  sertraline 100 milliGRAM(s) Oral daily    ==================== RESPIRATORY======================  cute hypoxemic respiratory failure s/p #8 shiley trach on   Tracheostomy decannulated  without issue, on 4L NC, monitor  Continue close monitoring of respiratory rate and breathing pattern, following of ABG’s with A-line monitoring, continuous pulse oximetry monitoring.     ====================CARDIOVASCULAR==================  Stage D Nonischemic Cardiomyopathy, Status Post HM2 on 2017; LVAD settings 9200, flow 5.7  TTE : reveals at least moderate MR, mild AI, severe global LV syst dysfxn, dec RV fxn   Propranolol for rate control of HR 2/2 Hyperthyroidism, titrate as tolerated per Endo  Continue invasive hemodynamic monitoring     propranolol 40 milliGRAM(s) Oral every 8 hours    ===================HEMATOLOGIC/ONC ===================  CTA A/P on  +L RP hematoma   Acute blood loss anemia, monitor H&H/Plts    Holding coumadin and ASA given recurrent GI bleeding  Heparin drip to prevent LVAD pump thrombosis    VTE prophylaxis, heparin  Infusion 500 Unit(s)/Hr (5 mL/Hr) IV Continuous <Continuous>    ===================== RENAL =========================  Continue monitoring urine output, I&OS, BUN/Cr   Euvolemic, even fluid balance, currently off diuretics.    Replete lytes PRN. Keep K> 4 and Mg >2     ==================== GASTROINTESTINAL===================  S/p cholecystostomy tube placed on  with IR  Tolerating TFs @ 65cc/hr at goal now  CTA A/P : Evaluation of the mesenteric vessels is limited by streak artifact from LVAD. There appears to be severe stenosis of the proximal SMA; abdominal mesenteric doppler is recommended for further evaluation. 2.  No small bowel findings to suggest acute mesenteric ischemia. 3.  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  Mesenteric duplex on 8/15 borderline stenosis of proximal SMA, s/p failed SMA stent, L fem RP hematoma on 9/10, s/p 3U PRBC.   Reglan for gut motility  Zofran PRN nausea/vomiting  No intervention for cholecystostomy tube, PEJ  S&S eval, ice chips PRN, FEES Monday    multivitamin 1 Tablet(s) Oral daily  GI prophylaxis, pantoprazole  Injectable 40 milliGRAM(s) IV Push every 12 hours  simethicone 80 milliGRAM(s) Chew every 8 hours PRN Gas  sodium chloride 0.9% lock flush 10 milliLiter(s) IV Push every 1 hour PRN Pre/post blood products, medications, blood draw, and to maintain line patency  sodium chloride 0.9%. 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 - Free T4 remains elevated   Per endocrine      - Thyroid US when trach is out      - Propanolol as above for optimal T4-->T3 conversion - titrate as tolerated       - c/w Methimazole       - s/p hydrocortisone    dextrose 40% Gel 15 Gram(s) Oral once  dextrose 50% Injectable 25 Gram(s) IV Push once  dextrose 50% Injectable 12.5 Gram(s) IV Push once  glucagon  Injectable 1 milliGRAM(s) IntraMuscular once  insulin lispro (ADMELOG) corrective regimen sliding scale   SubCutaneous every 6 hours  methimazole 10 milliGRAM(s) Oral every 8 hours    ========================INFECTIOUS DISEASE================  Afebrile, WBC rising 12.51 -> 13.58  Continue trending WBC and monitoring fever curve  Zosyn added empirically and PO Vanco for possibly recurrent aspiration PNA    piperacillin/tazobactam IVPB.. 3.375 Gram(s) IV Intermittent every 8 hours  vancomycin    Solution 125 milliGRAM(s) Oral every 12 hours      Patient requires continuous monitoring with bedside rhythm monitoring, pulse ox monitoring, and intermittent blood gas analysis. Care plan discussed with ICU care team. Patient remained critical and at risk for life threatening decompensation.     By signing my name below, I, Aparna Saleh, attest that this documentation has been prepared under the direction and in the presence of CLAUDIA Goldstein   Electronically signed: Cesia Villegas, 21 @ 12:35    I, Cristóbal Cisneros, personally performed the services described in this documentation. all medical record entries made by the scribmel were at my direction and in my presence. I have reviewed the chart and agree that the record reflects my personal performance and is accurate and complete  Electronically signed: CLAUDIA Goldstein

## 2021-09-25 NOTE — PROGRESS NOTE ADULT - PROBLEM SELECTOR PLAN 4
General surgery following   7/28 abd US: GB wall thickening/percholecystic fluid   7/30 perc dawn placed by IR   Maintain perc dawn drain for now per general surgery Dr. Azar   No immediate indication for cholecystectomy per general surgery Dr. Azar

## 2021-09-25 NOTE — PROGRESS NOTE ADULT - PROBLEM SELECTOR PLAN 3
WBC trending up --> 13.5 today 9/25   Afebrile, nontoxic appearing   Continue to trend WBC and monitor for fevers   Empiric Zosyn & PO Vanco initiated given h/o recurrent aspiration PNA

## 2021-09-25 NOTE — PROGRESS NOTE ADULT - ASSESSMENT
Assessment and Recommendation:   · Assessment	  Assessment and recommendation :  Recurrent Acute hypoxic respiratory Failure S/P tracheostomy and decannulation   Acute blood loss anemia S/P multiple  blood transfusion   going for mesenteric angiogram  S/P septic shock off vasopressin , levophed and off  Dobutamine   S/P cholecystostomy tube placement by IR    AF RVR back to regular sinus Rhythm   Non ischemic cardiomyopathy continue ACE inhibitor and B-Blockers   mesenteric ischemia failure to stent SMA , no plan for repeated trial   S/P 3 unit of blood transfusion   S/P Septic shock and cardiogenic shock   Stage D systolic heart failure S/P LVAD HM2   MH2 LVAD  with  TV Annuloplasty  Severe peripheral vascular disease  surgical reassessment for possible cholecystectomy   severe hyperglycemia on insulin coverage    on meropenem and po vancomycin   Reglan 10 mg for Gastric Motility   hyperthyroidism on Methimazole 10mg TID   critical care polyneuropathy   Anemia of Acute blood Loss   severe protein caloric malnutrition   S/P blood and FFP transfusion   Chronic kidney disease stage III  Depressed and withdrawn   on   NGT feeding on Vital  advanced to 55 cc/ hr   refuse PEG tube placement   GI prophylaxis with PPI   speech and swallow evaluation   Discussed with TCV team

## 2021-09-25 NOTE — PROGRESS NOTE ADULT - ASSESSMENT
64 y/o M w/ PMHx of ACC/AHA stage D HF due to NICM s/p HM2 LVAD & TV annuloplasty ring 9/12/17 as destination therapy due to severe peripheral artery disease with significant stenosis, SIADH, Depression, CKD-3 with hyperkalemia, past E. coli UTIs, driveline drainage (1/7/21) and COVID-19 (April 2020)  He was recently seen in clinic where he complained of abdominal pain and dark stools w constipation back in May. He presents to [facility] ER today weakness and fatigue, moderate and + Black stools for three days, on coumadin secondary to warfarin use in the setting of an LVAD. Patient has required transfusions for GIB in the past. Mostly recently back in jan 2021 pt had anemia with dark stools. No interventions was done at that time. However Last Endoscopy was done in July 2020 (negative). Today labs show patient is anemic with H/H of 4.5/16.3,. INR is 8.84 MAP in the 90s, Temp 35.1. He denies any chest pain, shortness of breath, dizziness, abd pain, nausea or vomiting.        Surgery/Hospital Course:  6/14 admit for melena w/ anemia, INR 8.84   6/15 Capsul study (+) for small bowel bleed, balloon endoscopy (old blood in prox ileum); post EGD - septic w/ L opacity, re-intubated for concern for aspiration, TTE (Mod MR, decrease biV w/ interventricular septum boweing towards R)  6/17 bronch   6/20 +C Diff   6/22 CT C/A/P: Fluid filled colon which may be 2/2 rapid transit. Small bilateral pleffs with associates. Compressive atelectasis New ISABELLE & LLL  parenchymal opacities, suspicious for pneumonia. 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   6/25 #8 Shiley trach at bedside   7/1 LVAD speed increased to 9200  7/2 Bronch  7/20 TC since 7/7. Patient transferred to SDU.   7/23 INR today 2.64.  H&H 7.3/24 this AM.  Will repeat CBC at noon, and will send stool guaiac Patient with persistent abdominal tenderness, rate of tube feeds decreased.  No nausea/vomiting.    7/24 INR today 2.4. H&H 9.1/28.6 low flow overnight /N&V, refusing Tube feeds on D5 1/2 normal  @50 cc/hr  7/25 INR 2.69  H&H 7.7/25.1 refusing Tube feeds on D5 1/2 normal  @50 cc/hr. This am + BM Melena Dr Oneill HF  aware- PRBC x1  GI team consulted -  NPO  plan on study in am-  D/w Dr Cadet Patient  to return to CTU for further management; 1PRBCS   7/27 Post op INR 2.2 today.  No bleeding. BC + for SM.  Pt is hypotensive requiring pressor and inotropic support.  ID follow up today on Cefepime will follow.  7/26 R PTC for PTX   7/28 CT C/A/P: sub q emphysema in R chest wall, GGO RUL, small ascites CTH negative; Abd US: GB thickening, pericholecystic fluid    7/31 Perchole drain in place continues to drain total output overnight 133.  Fever today 38.8   8/1 duplex LE negative   8/7 Patient with persistent abdominal pain, refusing tube feeds and medications, Psych consulted  8/13 CTA A/P ordered to r/o mesenteric ischemia 2/2 persistent anorexia, nausea, vomiting. Revealed:  Evaluation of the mesenteric vessels is limited by streak artifact from LVAD. There appears to be severe stenosis of the proximal SMA; abdominal mesenteric doppler is recommended for further evaluation. 2.  No small bowel findings to suggest acute mesenteric ischemia. 3.  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  8/15: Cultures resulted BC 1/2 +GPC in clusters, SC enterobacter; mesenteric duplex: borderline stenosis of proximal SMA  8/27: CT C/A/P noncon: Nondular opacities in R lung apex w/cavitation, abd nl  8/31:  Continue current care, treatment of thyrotoxicosis with medications as per endocrine, d/c ABX as per team.   9/8 RUQ sono: Contracted gallbladder with cholecystostomy tube in place.  9/10 failed SMA stent, L fem PSA, s/p 3U PRCB  9/12 CTA Abd/Pelvis L RP hematoma   9/23 Trach decannulated  9/25 WBC trending up this AM to 13.5, afebrile, nontoxic appearing - empirically started on Zosyn & PO Vanco. Heparin gtt initiated to prevent LVAD pump thrombosis given recent elevation in LDH - maintain at 500u/hr today, eventual PTT goal of 40-50. Pt received to SDU - VSS, NAD. Will continue current medication regimen for now. Plan for FEES with S/S Monday.

## 2021-09-25 NOTE — CHART NOTE - NSCHARTNOTEFT_GEN_A_CORE
Endocrine following for hyperthyroid management. Chart reviewed. Methimazole was increased to 10 q8 on 9/21 with first dose given late in the day almost 9/22. It is not enough time to reassess TFTs. Will order Free T4 for 9/28. Will follow-up and adjust as needed.      Billy Tipton D.O  [phone]

## 2021-09-25 NOTE — PROGRESS NOTE ADULT - PROBLEM SELECTOR PLAN 5
TTE 7/26: reveals at least moderate MR, mild AI, severe global LV syst dysfxn, dec RV fxn   Holding ASA, DVT ppx, and Coumadin 2/2 GIB   Heparin gtt initiated 9/25 to prevent LVAD pump thrombosis given elevation in LDH - will maintain at 500u/hr today as per Dr. Rahman with eventual PTT goal of 40-50  Daily CBC to monitor H/H  Daily LDH to trend  Continue Propranolol 40 mg q8h for rate control 2/2 hyperthyroidism & titrate as tolerated as per Endo

## 2021-09-25 NOTE — PROGRESS NOTE ADULT - PROBLEM SELECTOR PLAN 2
s/p trach 6/25   Decannulated 9/23 without issue   Continue on supplemental O2 via NC s/p trach 6/25   Decannulated 9/23 without issue   Continue on supplemental O2 via NC  Plan for FEES w/ S/S Monday 9/27

## 2021-09-25 NOTE — PROGRESS NOTE ADULT - SUBJECTIVE AND OBJECTIVE BOX
RICKY JOINT  MRN#: [id number]  Subjective:  Pulmonary progress  : recurrent Acute hypoxic respiratory Failure ,aspiration pneumonia, NICM  , chart reviewed and H/O obtained radiological and Laboratory study reviewed patient Examined     65M PMH ACC/AHA stage D HF due to NICM HM2 LVAD , TV annuloplasty ring 17 as destination therapy due to severe peripheral artery disease with significant stenosis  SIADH, Depression, CKD-3 with hyperkalemia, past E. coli UTIs, driveline drainage (21) and COVID-19 (back in 2020)  He was recently seen in clinic where he complained of abdominal pain and dark stools w constipation back in May. He presents to [facility] ER today weakness and fatigue, moderate and + Black stools for three days, on coumadin secondary to warfarin use in the setting of an LVAD. Patient has required transfusions for GIB in the past. Mostly recently back in 2021 pt had anemia with dark stools. No interventions was done at that time. However Last Endoscopy was done in 2020 (negative). Today labs show patient is anemic with H/H of 4.5/16.3,. INR is 8.84 MAP in the 90s, Temp 35.1. He denies any chest pain, shortness of breath, dizziness, abd pain, nausea or vomiting. found to have  rectal bleeding underwent endoscopy ,old blood in the proximal ileum ,  develop sepsis with LL opacity given Antibiotics , Extubated , reintubated , Bronchoscopy on Zosyn for LL pneumonia  and Amiodarone S/P TV Annuloplasty , patient remain intubated on full ventilatory support .S/P multiple units of blood transfusion , remain on full ventilatory support on Precedex and propofol , new central IJ line , diarrhea C diff. +ve on po vancomycin and IV Flagyl,  mildly distended belly , fever start on cefepime 2gm q 8 hrs S/P tracheostomy .  new RT Subclavian central line continue on contact  isolation ,still diarrhea on C-diff antibiotics ENT follow up appreciated , trial of C-PAP as tolerated , , copious secretion from trach. site chest x ray left lower lobe pneumonia , tolerating trch. collar 50% FI02 still excessive secretion need pulmonary toilet , off Ancef antibiotic , no more diarrhea back on full support mechanical ventilator , chest x ray show improvement in LLL air space disease, more awake and responsive on tube feeding no more diarrhea ,  no nausea or vomiting or diarrhea still very weak and tired , back on tube feeding ,still on po vancomycin , getting PT and OT at bed side , no plan for decannulation for now. , no more diarrhea receiving PT and OPT at bed side , minimal secretion from tracheostomy site , no SOB getting stronger , improve muscle tone patient transfer to monitor bed still on contact isolation for C-Difficel colitis on 50% FI02, and change to Suresh  tube feeding still loose stool . H/H drop significantly require blood transfusion , most likely GI bleeding , IV heparin D/C ,  H/H is stable ., patient develop TR sided  pneumothorax require chest tube placement , RT IJ central line  placed , develop fever shaking chills , blood culture positive for serratia marcescens , start on cefepime .the patient  become hypoxic and hypotensive placed on full ventilatory support and Vasopressin , levophed and Dobutamine ,S/P blood transfusion on meropenem and vancomycin ,   , on and  off pressors , occasional agitation on Precedex .S/P IR cholecystostomy tube drainage placement in the RT upper Quadrant , resume anticoagulation chest x ray noted C-PAP trail lasted only for 2 hrs , new RT SC line and D/C RT IJ line , RT pig tail cathter has been removed , tolerating C-PAP trial placed on trach. collar 50% FI02 GI consultant noted on NG tube feeding as tolerated , develop AF RVR S/P  bolus Amiodarone  back to regular sinus Rhythm , Flat Affect depressed , back on tube feeding Vital AF at 60 cc/ hr .still intermittent abdominal pain , no fever saturation is accepted  back on full ventilatory support , tired and sleepy on round    .start  back on  tube feeding . tolerating it very well , no nausea or vomiting , good 02 saturation on trac-collar on methimazole for hyperthyroidism , no new C/O no plan for decannulation yet , electrolyte blood test is still pending .on respiratory isolation sputum culture is negative , increase WBC  improved on cefepime , sputum positive for enterococcus , condition is the same ,still C/O Abdominal pain , white count is improving , no chest pain or SOB ,  .placed on Reglan 10 mg TID for gastric motility , depressed , withdrawn. on full NG tube diet with Vital , secretion are much improved , went for mesenteric angiogram , unable to stent SMA S/P 3 units of blood transfusion on trach. collar 40% Fi02, anxious to eat discuss possible decannulation,  H/H  are stable vascular  note appreciated no mediate plan for repeat mesenteric angiogram remain on trach.-collar at 40% FI02, RT IJ in place reassessed for stent in the SMA by vascular, no plan for now depresses and withdrawn on meropenem and po vancomycin for elevated white count pending culture no plan for further vascular study or decannulation  , no events overnight , white count is trending down , dark stool stable H/H ,surgical opinion for possible Lap cholecystectomy. no plan for surgery refuse PEG tube placement , no new over night events 3  patient is decannulated tolerating it very well .speech and swallow evaluation      (2021 16:57)    PAST MEDICAL & SURGICAL HISTORY:  CHF (congestive heart failure)    CAD (coronary artery disease)  Depression    Pleural effusion    History of 2019 novel coronavirus disease (COVID-19)  2020    Hemorrhoids    Bleeding hemorrhoids    Peripheral arterial disease    Claudication    BPH with urinary obstruction    ACC/AHA stage D systolic heart failure  Anticoagulation goal of INR 2.0 to 2.5    Falls    Clavicle fracture    CKD (chronic kidney disease), stage III    Iron deficiency anemia    H/O epistaxis    Vertigo    GI bleed    S/P TVR (tricuspid valve repair)    S/P ventricular assist device    S/P endoscopy    OBJECTIVE:  ICU Vital Signs Last 24 Hrs  T(C): 38.2 (2021 10:00), Max: 38.5 (2021 12:00)  T(F): 100.8 (2021 10:00), Max: 101.3 (2021 12:00)  HR: 65 (2021 10:00) (61 - 69)  BP: --  BP(mean): --  ABP: 105/67 (2021 10:00) (90/54 - 113/64)  ABP(mean): 77 (2021 10:00) (63 - 77)  RR: 20 (2021 10:00) (19 - 35)  SpO2: 99% (2021 10:00) (96% - 100%)      -19 @ 07:01  -  06-20 @ 07:00  --------------------------------------------------------  IN: 2693.9 mL / OUT: 1415 mL / NET: 1278.9 mL     @ 07: @ 10:49  --------------------------------------------------------  IN: 420.8 mL / OUT: 115 mL / NET: 305.8 mL  PHYSICAL EXAM:Daily   Elderly male S/P tracheostomy S/O decannulation   Daily Weight in k.4 (2021 00:00)  HEENT:     + NCAT  + EOMI  - Conjuctival edema   - Icterus   - Thrush   - ETT  + NGT/OGT  Neck:         + FROM  RT IJ  line JVD  - Nodes - Masses + Mid-line trachea - Tracheostomy  Chest:            normal A-P diameter    Lungs:          + CTA   + Rhonchi    - Rales    - Wheezing + Decreased  LT BS   - Dullness R L  Cardiac:       + S1 + S2    + RRR   - Irregular   - S3  - S4    - Murmurs   - Rub   - Hamman’s sign   Abdomen:    + BS  + Soft + Non-tender - Distended - Organomegaly - PEG .cholecystostomy tube in place  Extremities:   - Cyanosis U/L   - Clubbing  U/L  + LE/UE Edema   + Capillary refill    + Pulses   Neuro:        - Awake   -  Alert   - Confused   - Lethargic   + Sedated  + Generalized Weakness  Skin:        - Rashes    - Erythema   + Normal incisions   + IV sites intact          HOSPITAL MEDICATIONS: All medications reviewed and analyzed  MEDICATIONS  (STANDING):  amiodarone    Tablet 200 milliGRAM(s) Oral daily  chlorhexidine 0.12% Liquid 15 milliLiter(s) Oral Mucosa every 12 hours  chlorhexidine 2% Cloths 1 Application(s) Topical <User Schedule>  dexmedetomidine Infusion 0.5 MICROgram(s)/kG/Hr (9.81 mL/Hr) IV Continuous <Continuous>  dextrose 50% Injectable 50 milliLiter(s) IV Push every 15 minutes  heparin  Infusion 400 Unit(s)/Hr (12.5 mL/Hr) IV Continuous <Continuous>  Hydromorphone  Injectable 0.5 milliGRAM(s) IV Push once  insulin lispro (ADMELOG) corrective regimen sliding scale   SubCutaneous every 6 hours  pantoprazole  Injectable 40 milliGRAM(s) IV Push every 12 hours  piperacillin/tazobactam IVPB.. 3.375 Gram(s) IV Intermittent every 8 hours  propofol Infusion 20 MICROgram(s)/kG/Min (9.42 mL/Hr) IV Continuous <Continuous>  sodium chloride 0.9% lock flush 3 milliLiter(s) IV Push every 8 hours  sodium chloride 0.9%. 1000 milliLiter(s) (10 mL/Hr) IV Continuous <Continuous>    MEDICATIONS  (PRN):  acetaminophen    Suspension .. 650 milliGRAM(s) Oral every 6 hours PRN Temp greater or equal to 38C (100.4F)    LABS: All Lab data reviewed and analyzed                        10.3   13.58 )-----------( 418      ( 25 Sep 2021 00:28 )             31.6        130<L>  |  94<L>  |  24<H>  ----------------------------<  158<H>  4.5   |  26  |  0.39<L>    Ca    9.8      25 Sep 2021 00:28  Phos  3.4       Mg     1.8         TPro  8.7<H>  /  Alb  3.2<L>  /  TBili  0.8  /  DBili  x   /  AST  43<H>  /  ALT  49<H>  /  AlkPhos  291<H>      Ca    9.7      24 Sep 2021 00:46  Phos  3.5       Mg     2.0         TPro  8.8<H>  /  Alb  3.1<L>  /  TBili  0.8  /  DBili  x   /  AST  50<H>  /  ALT  47<H>  /  AlkPhos  288<H>                                                                                                                                                          PTT - ( 2021 04:52 )  PTT:45.2 sec LIVER FUNCTIONS - ( 2021 00:42 )  Alb: 3.4 g/dL / Pro: 6.7 g/dL / ALK PHOS: 213 U/L / ALT: 15 U/L / AST: 24 U/L / GGT: x           RADIOLOGY: - Reviewed and analyzed RT Pig tail cathter  , LVAD HM2, CT scan of abdomen reviewed result noted

## 2021-09-26 NOTE — PROCEDURE NOTE - ADDITIONAL PROCEDURE DETAILS
Called by primary team for difficult aleman placement in critical patient.  Using aseptic technique, 18F coude inserted with ease. Patient's bladder empty, was flushed with normal saline to confirm placement. 10cc of sterile water inserted into the balloon.    Aleman plan per primary team.

## 2021-09-26 NOTE — PROGRESS NOTE ADULT - ASSESSMENT
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 coli UTIs, driveline drainage (1/7/21) and COVID-19 (back in April 2020)  He was recently seen in clinic where he complained of abdominal pain and dark stools w constipation back in May. He presents to [facility] ER today weakness and fatigue, moderate and + Black stools for three days, on coumadin secondary to warfarin use in the setting of an LVAD. Patient has required transfusions for GIB in the past. Mostly recently back in jan 2021 pt had anemia with dark stools. No interventions was done at that time. However Last Endoscopy was done in July 2020 (negative). Today labs show patient is anemic with H/H of 4.5/16.3,. INR is 8.84 MAP in the 90s,   Returned from endoscopy appearing ill tachypneic with chills with new white out of his left lung      A/p  s/p LVAD placement  admission prolonged following GI bleeding, aspiration event, CDI, VAP, chronic abdominal pain, most recently with retroperitoneal bleeding post attempted stent placement  Acute event while on the floor with possible aspiration and required re-intubation and transfer to cTU  Agree with Vanco and Zosyn  follow Vanco trough   oral Vanco in the setting if severe prior C.diff infection  Image with CT chest/abd/pelvis  requiring large doses of pressor support  GOC discussion with family    prognosis poor    Discussed with CTU    Morris Prater MD  [phone]  After 5pm/weekends [phone]

## 2021-09-26 NOTE — PROGRESS NOTE ADULT - SUBJECTIVE AND OBJECTIVE BOX
Patient seen and examined at the bedside.    Remained critically ill on continuous ICU monitoring.    OBJECTIVE:  Vital Signs Last 24 Hrs  T(C): 38.1 (26 Sep 2021 16:00), Max: 38.1 (26 Sep 2021 16:00)  T(F): 100.6 (26 Sep 2021 16:00), Max: 100.6 (26 Sep 2021 16:00)  HR: 101 (26 Sep 2021 16:30) (88 - 151)  BP: --  BP(mean): --  RR: 21 (26 Sep 2021 16:30) (14 - 65)  SpO2: 100% (26 Sep 2021 16:30) (83% - 100%)    REVIEW OF SYSTEMS:  [x ] N/A    Physical Exam:  General: Sedated and intubated on multiple lines, gtt, & tubes  Neurology: Sedated  Eyes: EOMI, Gross vision intact  ENT/Neck: + ENT Midline, Neck supple, trachea midline, No JVD, Gross hearing intact  Respiratory: No wheezing, rhonchi. Rales noted bilaterally.  CV: RRR, S1S2, no murmurs, rubs or gallops         [] Sternal dressing, [] Mediastinal CT, [] Pleural CT, [] ROMY drain        [] Sinus rhythm, [] Afib, [] Temporary pacing, [] PPM [x] LVAD  Abdominal: Soft, NT, ND +BS,   Extremities: 1-2+ pedal edema noted, + peripheral pulses  Skin: No Rashes, CTA A/P on D# 14 +L RP hematoma, Ecchymosis                           Assessment:  65M PMH ACC/AHA stage D HF due to NICM HM2 LVAD , TV annuloplasty ring 9/12/17 as destination therapy due to severe peripheral artery disease with significant stenosis  SIADH, Depression, CKD-3 with hyperkalemia, past E. coli UTIs, driveline drainage (1/7/21) and COVID-19 (back in April 2020).    Stage D Nonischemic Cardiomyopathy, Status Post HM2 on 9/2017    Cardiogenic shock  Hemodynamic instability   Acute hypoxemic respiratory failure s/p trach D# 93  GI bleed , Status Post Enteroscopy D# 104  Anemia, in setting of melena S/P 3U PRBC  Chronic Kidney Disease / Hyponatremia   C.diff positive on D# 90   Hypovolemia  Hyperglycema  Septic shock  Leukocytosis  GB thickening/percholecystic s/p perc choley by IT D# 58  SMA stenosis  Serratia/citrobacter pneumonia on D# 81  Stenotrophomonas pneumonia on D# 56  Enterobacter pneumonia on D# 44  Nasuea/vomiting  Deconditioning    Plan:   ***Neuro***  [] Nonfocal  [x] Sedated   Sedated on Propofol gtt for ventilator synchrony.   Tylenol and Dilaudid for pain management     ***Cardiovascular***  SR / 104 no back up pacing / monitor for Rhythm abnormalities  Continue invasive hemodynamic monitoring / CVP 2 MAP 73  Hypovolemia / Hematocrit 28.4%, Hgb 9.1, lactate 2.0 / S/P 3U PRBC  Transfuse pRBCs if remain hemodynamically unstable   Post operative labile hemodynamic / requiring Vasopressin, Levophed, and Phenylphrine infusions     LVAD management with settings of rpm 9200, L/min 5    ***Pulmonary***  Acute hypoxemic respiratory failure s/p #8 shiley trach D# 93  Mode: AC/ CMV (Assist Control/ Continuous Mandatory Ventilation)  RR (machine): 20  TV (machine): 500  FiO2: 50  PEEP: 5  ITime: 1  MAP: 11  PIP: 24            Will plan to wean & extubate once pt is hemodynamically stable.     ***GI***  S/P cholecystostomy tube placed D# 58 with IR  GI bleed, Status Post Enteroscopy D# 104  Stress ulcer prophylaxis: [x] Protonix  [] Pepcid  Zofran PRN nausea/vomiting  Simethicone for gas  Reglan for gut motility  NPO, advance diet as tolerated.     ***Renal***  CKD-3 with hyponatremia   Continue to monitor I/Os, BUN/Creatinine.   Replete lytes PRN. Keep K> 4 and Mg >2.  Landrum present [x] negative     ***ID***  TMAX: 100.6F and elevated WBC at 43.19.  Monitor temperature and trend WBC.   Zosyn added empirically and PO Vanco for possibly recurrent aspiration PNA    ***Endocrine***  [x] Hyperglycemia: HbA1c 5.4%             - [x] ISS              - Need tight glycemic control to prevent wound infection.    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 Care plan discussed with the ICU care team.   Patient remained critical, at risk for life threatening decompensation.    I have spent 30 minutes providing critical care management to this patient.    By signing my name below, I, Daniela Chowdary, attest that this documentation has been prepared under the direction and in the presence of Dona Perry MD   Electronically signed: Cesia Suresh, 09-26-21 @ 17:53    I, Dona Perry, personally performed the services described in this documentation. all medical record entries made by the scribe were at my direction and in my presence. I have reviewed the chart and agree that the record reflects my personal performance and is accurate and complete  Electronically signed: Dona Perry MD  Patient seen and examined at the bedside.    Remained critically ill on continuous ICU monitoring.    OBJECTIVE:  Vital Signs Last 24 Hrs  T(C): 38.1 (26 Sep 2021 16:00), Max: 38.1 (26 Sep 2021 16:00)  T(F): 100.6 (26 Sep 2021 16:00), Max: 100.6 (26 Sep 2021 16:00)  HR: 101 (26 Sep 2021 16:30) (88 - 151)  BP: --  BP(mean): --  RR: 21 (26 Sep 2021 16:30) (14 - 65)  SpO2: 100% (26 Sep 2021 16:30) (83% - 100%)    REVIEW OF SYSTEMS:  [x ] N/A    Physical Exam:  General: Sedated and intubated on multiple lines, gtt, & tubes  Neurology: Sedated  Eyes: EOMI,   ENT/Neck: , Neck supple, + ET trachea midline, No JVD,   Respiratory: Basilar rales noted   CV: RRR, S1S2,              [x] Sinus rhythm,  [x] LVAD  Abdominal: Soft, NT, ND +BS,                    RUQ billary drain                   LVAD Driveline   Extremities: 1-2+ pedal edema noted, + peripheral pulses  Skin: No Rashes,                        Assessment:      65 yr old male H/O HTN, NICM, PVD,  ACC /AHA stage D HF, S/P LVAD , TV Annuloplasty 9/12/2017  Post op course complicated with  recurrent GIB   Recurrent Vomiting / Aspiration / Respiratory failure requiring intubation / S/P trach 6/25-- Decannulated 9/24  Recurrent Nosocomial sepsis including severe C-diff, Cholecystitis billary sepsis S/P cholecystostomy 7/30    PVD /Aorto illiac disease w moderate SMA stenosis / S/P Attempted SMA stent aborted 9/10 / L RP bleed requiring multiple PRBC Tx  Amiodarone induced thyroiditis / thyrotoxicosis   Severe Depression  Decannulated 9/24 transferred to [place] 9/25 multiple episodes of vomiting aspiration respiratory failure with cardiovascular collapse, septic shock       Plan:   ***Neuro***  [x] Nonfocal  [x] Sedated   Sedated on Propofol gtt for ventilator synchrony.       ***Cardiovascular***  S/P LVAD  Aspiration pneumonia / Septic shock   Invasive hemodynamic monitoring   -110 / CVP 5-8 / PAP 28/13 / CI 2.5 -3 / / SVO2 62 / lactate 2.1 / Hct 25%   Hemodynamic data with high output vasodilatory  shock  Levo 0.22 mcg/kg/min, Vasopressin 0.1 units /kg/min   Jeremy gtt off  S/P large volume resuscitation 2PRBC / 3 lit Albumin   Pancultured CT + multifocal Left sided Pneumonia   Broad spectrum Abx pnd culture results  Stress dose steroids for vasopressor refractory shock  LVAD 9200 RPM / Flows improved post volume resuscitation   AC on hold for several large dark bloody BM  Rising LDH preceding current decompensation, Patient was off AC 7/22 - 9/10 recently restarted on AC with bloody BM  At risk for VAD thrombosis  VT started on Lidocaine gtt      ***Pulmonary***    Reintubated for aspiration pneumonia   Mode: AC/ CMV (Assist Control/ Continuous Mandatory Ventilation)  RR (machine): 20  TV (machine): 500  FiO2: 50  PEEP: 5  ITime: 1  MAP: 11  PIP: 24            Once hemodynamically stable will need Re trach     ***GI***  NPO  Sump to LWS  S/P cholecystostomy tube  GI bleed  Cyclical vomiting VT will need J tube     ***Renal***  CKD-3 / BPH   Continue to monitor I/Os, BUN/Creatinine.   Replete lytes PRN. Keep K> 4 and Mg >2.  Landrum present [x]     ***ID***  TMAX: 100.6F and elevated WBC at 43.19.  Monitor temperature and trend WBC.   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 Care plan discussed with the ICU care team.   Patient remained critical, at risk for life threatening decompensation.    I have spent 60  minutes providing critical care management to this patient.    By signing my name below, I, Daniela Chowdary, attest that this documentation has been prepared under the direction and in the presence of Dona Perry MD   Electronically signed: Cesia Suresh, 09-26-21 @ :53    I, Dona Perry, personally performed the services described in this documentation. all medical record entries made by the scribe were at my direction and in my presence. I have reviewed the chart and agree that the record reflects my personal performance and is accurate and complete  Electronically signed: Dona Perry MD  Patient seen and examined at the bedside.    Remained critically ill on continuous ICU monitoring.    OBJECTIVE:  Vital Signs Last 24 Hrs  T(C): 38.1 (26 Sep 2021 16:00), Max: 38.1 (26 Sep 2021 16:00)  T(F): 100.6 (26 Sep 2021 16:00), Max: 100.6 (26 Sep 2021 16:00)  HR: 101 (26 Sep 2021 16:30) (88 - 151)  BP: --  BP(mean): --  RR: 21 (26 Sep 2021 16:30) (14 - 65)  SpO2: 100% (26 Sep 2021 16:30) (83% - 100%)    REVIEW OF SYSTEMS:  [x ] N/A    Physical Exam:  General: Sedated and intubated on multiple lines, gtt, & tubes  Neurology: Sedated  Eyes: EOMI,   ENT/Neck: , Neck supple, + ET trachea midline, No JVD,   Respiratory: Basilar rales noted   CV: RRR, S1S2,              [x] Sinus rhythm,  [x] LVAD  Abdominal: Soft, NT, ND +BS,                    RUQ billary drain                   LVAD Driveline   Extremities: 1-2+ pedal edema noted, + peripheral pulses  Skin: No Rashes,                        Assessment:      65 yr old male H/O HTN, NICM, PVD,  ACC /AHA stage D HF, S/P LVAD , TV Annuloplasty 9/12/2017  Post op course complicated with  recurrent GIB   Recurrent Vomiting / Aspiration / Respiratory failure requiring intubation / S/P trach 6/25-- Decannulated 9/24  Recurrent Nosocomial sepsis including severe C-diff, Cholecystitis billary sepsis S/P cholecystostomy 7/30    PVD /Aorto illiac disease w moderate SMA stenosis / S/P Attempted SMA stent aborted 9/10 / L RP bleed requiring multiple PRBC Tx  Amiodarone induced thyroiditis / thyrotoxicosis   Severe Depression  Decannulated 9/24 transferred to [place] 9/25 multiple episodes of vomiting aspiration respiratory failure with cardiovascular collapse, septic shock       Plan:   ***Neuro***  [x] Nonfocal  [x] Sedated   Sedated on Propofol gtt for ventilator synchrony.       ***Cardiovascular***  S/P LVAD  Aspiration pneumonia / Septic shock   Invasive hemodynamic monitoring   -110 / CVP 5-8 / PAP 28/13 / CI 2.5 -3 / / SVO2 62 / lactate 2.1 / Hct 25%   Hemodynamic data with high output vasodilatory  shock  Levo 0.22 mcg/kg/min, Vasopressin 0.1 units /kg/min   Jeremy gtt off  S/P large volume resuscitation 2PRBC / 3 lit Albumin   Pancultured CT + multifocal Left sided Pneumonia   Broad spectrum Abx pnd culture results  Stress dose steroids for vasopressor refractory shock  LVAD 9200 RPM / Flows improved post volume resuscitation   AC on hold for several large dark bloody BM  Rising LDH preceding current decompensation, Patient was off AC 7/22 - 9/10 recently restarted on AC with bloody BM  At risk for VAD thrombosis  VT started on Lidocaine gtt      ***Pulmonary***    Reintubated for aspiration pneumonia   Mode: AC/ CMV (Assist Control/ Continuous Mandatory Ventilation)  RR (machine): 20  TV (machine): 500  FiO2: 50  PEEP: 5  ITime: 1  MAP: 11  PIP: 24            Once hemodynamically stable will need Re trach     ***GI***  NPO  Sump to LWS  S/P cholecystostomy tube  GI bleed  Cyclical vomiting VT will need J tube     ***Renal***  CKD-3 / BPH   Continue to monitor I/Os, BUN/Creatinine.   Replete lytes PRN. Keep K> 4 and Mg >2.  Landrum present [x]     ***ID***  TMAX: 100.6F and elevated WBC at 43.19.  Monitor temperature and trend WBC.   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 Care plan discussed with the ICU care team.   Patient remained critical, at risk for life threatening decompensation.    I have spent 60  minutes providing critical care management to this patient.    By signing my name below, I, Daniela Chowdary, attest that this documentation has been prepared under the direction and in the presence of Dona Perry MD   Electronically signed: Cesia Suresh, 09-26-21 @ 17:53    I, Dona Perry, personally performed the services described in this documentation. all medical record entries made by the scribe were at my direction and in my presence. I have reviewed the chart and agree that the record reflects my personal performance and is accurate and complete  Electronically signed: Dona Perry MD

## 2021-09-26 NOTE — PROGRESS NOTE ADULT - SUBJECTIVE AND OBJECTIVE BOX
Subjective:  - 5 episodes of vomiting yesterday and subsequently intubated.    Medications:  acetaminophen    Suspension .. 650 milliGRAM(s) Enteral Tube every 6 hours PRN  albumin human  5% IVPB 250 milliLiter(s) IV Intermittent every 30 minutes  chlorhexidine 0.12% Liquid 15 milliLiter(s) Oral Mucosa two times a day  chlorhexidine 2% Cloths 1 Application(s) Topical <User Schedule>  dextrose 40% Gel 15 Gram(s) Oral once  dextrose 50% Injectable 25 Gram(s) IV Push once  dextrose 50% Injectable 12.5 Gram(s) IV Push once  glucagon  Injectable 1 milliGRAM(s) IntraMuscular once  hydrocortisone sodium succinate Injectable 100 milliGRAM(s) IV Push every 8 hours  insulin lispro (ADMELOG) corrective regimen sliding scale   SubCutaneous every 6 hours  lidocaine   4% Patch 1 Patch Transdermal daily  lidocaine   Infusion 0.5 mG/Min IV Continuous <Continuous>  methimazole 10 milliGRAM(s) Oral every 8 hours  metoclopramide Injectable 10 milliGRAM(s) IV Push every 8 hours  multivitamin 1 Tablet(s) Oral daily  norepinephrine Infusion 0.05 MICROgram(s)/kG/Min IV Continuous <Continuous>  ondansetron Injectable 4 milliGRAM(s) IV Push every 6 hours PRN  pantoprazole  Injectable 40 milliGRAM(s) IV Push every 12 hours  phenylephrine    Infusion 4.329 MICROgram(s)/kG/Min IV Continuous <Continuous>  piperacillin/tazobactam IVPB.. 3.375 Gram(s) IV Intermittent every 8 hours  propofol Infusion 13.528 MICROgram(s)/kG/Min IV Continuous <Continuous>  simethicone 80 milliGRAM(s) Chew every 8 hours PRN  sodium chloride 0.9% lock flush 10 milliLiter(s) IV Push every 1 hour PRN  sodium chloride 0.9%. 1000 milliLiter(s) IV Continuous <Continuous>  thiamine 100 milliGRAM(s) Oral daily  vancomycin    Solution 125 milliGRAM(s) Oral every 6 hours  vancomycin  IVPB 1000 milliGRAM(s) IV Intermittent every 12 hours  vasopressin Infusion 0.1 Unit(s)/Min IV Continuous <Continuous>      ICU Vital Signs Last 24 Hrs  T(C): 37.2, Max: 37.2 (09-26 @ 08:00)  HR: 90 (88 - 151)  BP: 103/62 (103/62 - 103/62)  BP(mean): 77 (77 - 77)  ABP: 81/71 (62/43 - 118/83)  ABP(mean): 75 (52 - 102)  RR: 20 (14 - 65)  SpO2: 100% (83% - 100%)    Weight in k.7 (21)  Weight in k.1 (21)      I&O's Summary Last 24 Hrs    IN: 3526.3 mL / OUT: 995 mL / NET: 2531.3 mL      Tele: SR    Physical Exam:    General: Intubated and sedated.  Neck: JVP not elevated.  Respiratory: Clear to auscultation bilaterally  CV: RRR. Normal S1 and S2. No murmurs, rub, or gallops. Radial pulses normal.  Abdomen: Soft, non-distended, non-tender  Skin: No skin lesions  Extremities: Warm, no edema  Neurology: MARELY, sedated.  Psych: MARELY    Labs:    ( 21 @ 02:23 )               9.1    43.19 )--------( 330                  28.4     ( 21 @ 02:23 )     135  |  99  |  41  ---------------------<  106  4.6  |  21  |  1.03    Ca 9.7  Phos 3.0  Mg 1.6    ( 21 @ 02:23 )  TPro  8.1  /  Alb  3.5  /  TBili  3.2  /  DBili  x   /  AST  50  /  ALT  49  /  AlkPhos  295  ( 21 @ 22:21 )  TPro  8.9  /  Alb  3.3  /  TBili  1.4  /  DBili  x   /  AST  50  /  ALT  56  /  AlkPhos  335    PTT/PT/INR - ( 21 @ 21:11 )  PTT: 29.9 sec / PT: x     / INR: x        Oxygen Saturation, Mixed: 62.7 (21 @ 06:50)    VBG - ( 21 @ 04:13 )  pH: 7.37  / pCO2: 42    / pO2: 34    / Oxygen saturation: 61.5    VBG - ( 21 @ 01:33 )  pH: 7.33  / pCO2: 55    / pO2: 41    / Oxygen saturation: 64.0      ABG - ( 21 @ 06:50 )  pH: 7.36  / pCO2: 38    / pO2: 133   / HCO3: 22    / Base Excess: -3.6  / SaO2: 98.9     Blood Gas Venous - Lactate: 1.5 (21 @ 04:13)    Lactate Dehydrogenase, Serum: 484 (21 @ 02:23)  Lactate Dehydrogenase, Serum: 407 (21 @ 00:28)

## 2021-09-26 NOTE — PROGRESS NOTE ADULT - PROBLEM SELECTOR PLAN 2
- Continue current speed of 9200 RPM. Speed increased this admission due to signs of inadequately unloaded left ventricle  - Slight drop in Hgb, heparin gtt currently on hold  - Currently off coumadin and aspirin given recurrent GI bleeding.   - Trend LDH  - May need to consider repeating ECHO, last ECHO was completed 7/26

## 2021-09-26 NOTE — PROGRESS NOTE ADULT - SUBJECTIVE AND OBJECTIVE BOX
RICKY JOINT  MRN#: [id number]  Subjective:  Pulmonary progress  : recurrent Acute hypoxic respiratory Failure ,aspiration pneumonia, NICM  ,   65M PMH ACC/AHA stage D HF due to NICM HM2 LVAD , TV annuloplasty ring 17 as destination therapy due to severe peripheral artery disease with significant stenosis  SIADH, Depression, CKD-3 with hyperkalemia, past E. coli UTIs, driveline drainage (21) and COVID-19 (back in 2020)  He was recently seen in clinic where he complained of abdominal pain and dark stools w constipation back in May. He presents to [facility] ER today weakness and fatigue, moderate and + Black stools for three days, on coumadin secondary to warfarin use in the setting of an LVAD. Patient has required transfusions for GIB in the past. Mostly recently back in 2021 pt had anemia with dark stools. No interventions was done at that time. However Last Endoscopy was done in 2020 (negative). Today labs show patient is anemic with H/H of 4.5/16.3,. INR is 8.84 MAP in the 90s, Temp 35.1. He denies any chest pain, shortness of breath, dizziness, abd pain, nausea or vomiting. found to have  rectal bleeding underwent endoscopy ,old blood in the proximal ileum ,  develop sepsis with LL opacity given Antibiotics , Extubated , reintubated , Bronchoscopy on Zosyn for LL pneumonia  and Amiodarone S/P TV Annuloplasty , patient remain intubated on full ventilatory support .S/P multiple units of blood transfusion , remain on full ventilatory support on Precedex and propofol , new central IJ line , diarrhea C diff. +ve on po vancomycin and IV Flagyl,  mildly distended belly , fever start on cefepime 2gm q 8 hrs S/P tracheostomy .  new RT Subclavian central line continue on contact  isolation ,S/P  C-diff antibiotics, no more diarrhea back on full support mechanical ventilator , chest x ray show improvement in LLL air space disease, more awake and responsive on tube feeding no more diarrhea ,  no nausea or vomiting or diarrhea still very weak and tired , back on tube feeding ,still on po vancomycin , getting PT and OT at bed side ,   , no SOB getting stronger , improve muscle tone patient transfer to monitor bed still on contact isolation for C-Difficel colitis on 50% FI02, and change to Suresh  tube feeding still loose stool . H/H drop significantly require blood transfusion , most likely GI bleeding , IV heparin D/C ,  H/H is stable ., patient develop TR sided  pneumothorax require chest tube placement , RT IJ central line  placed , develop fever shaking chills , blood culture positive for serratia marcescens , start on cefepime .the patient  become hypoxic and hypotensive placed on full ventilatory support and Vasopressin , levophed and Dobutamine ,S/P blood transfusion on meropenem and vancomycin ,   , on and  off pressors , occasional agitation on Precedex .S/P IR cholecystostomy tube drainage placement in the RT upper Quadrant , resume anticoagulation chest x ray noted C-PAP trail lasted only for 2 hrs , new RT SC line and D/C RT IJ line , RT pig tail cathter has been removed , tolerating C-PAP trial placed on trach. collar 50% FI02 GI consultant noted on NG tube feeding as tolerated , develop AF RVR S/P  bolus Amiodarone  back to regular sinus Rhythm , Flat Affect depressed , back on tube feeding Vital AF at 60 cc/ hr .still intermittent abdominal pain , no fever saturation is accepted  back on full ventilatory support ,   .start  back on  tube feeding . tolerating it very well , no nausea or vomiting , good 02 saturation on trac-collar on methimazole for hyperthyroidism , no new C/O no plan for decannulation yet , electrolyte blood test is still pending .on respiratory isolation sputum culture is negative , increase WBC  improved on cefepime , sputum positive for enterococcus , condition is the same ,still C/O Abdominal pain , white count is improving , no chest pain or SOB ,  .placed on Reglan 10 mg TID for gastric motility , depressed , withdrawn. , went for mesenteric angiogram , unable to stent SMA S/P 3 units of blood transfusion   H/H  remain  stable vascular  note appreciated no mediate plan for repeat mesenteric angiogram remain on trach.-collar at 40% FI02, RT IJ in place reassessed for stent in the SMA by vascular, no plan for now depresses and withdrawn on meropenem and po vancomycin for elevated white count pending culture no plan for further vascular study or decannulation  , dark stool stable H/H ,surgical opinion for possible Lap cholecystectomy. over night events noted develop respiratory distress, , rectal bleeding, require intubation placed on full ventilatory support , FI02 is 50% also became hemianosmically unstable require triple pressor support with levophed , vasopressin and norsynephrine, pan culture placed  on Vancomycin IV and PO  and Zosyn, sedated with propofol kept NPO , new central line RT and left IJ cathter placed .      (2021 16:57)    PAST MEDICAL & SURGICAL HISTORY:  CHF (congestive heart failure)    CAD (coronary artery disease)  Depression    Pleural effusion    History of 2019 novel coronavirus disease (COVID-19)  2020    Hemorrhoids    Bleeding hemorrhoids    Peripheral arterial disease    Claudication    BPH with urinary obstruction    ACC/AHA stage D systolic heart failure  Anticoagulation goal of INR 2.0 to 2.5    Falls    Clavicle fracture    CKD (chronic kidney disease), stage III    Iron deficiency anemia    H/O epistaxis    Vertigo    GI bleed    S/P TVR (tricuspid valve repair)    S/P ventricular assist device    S/P endoscopy    OBJECTIVE:  ICU Vital Signs Last 24 Hrs  T(C): 38.2 (2021 10:00), Max: 38.5 (2021 12:00)  T(F): 100.8 (2021 10:00), Max: 101.3 (2021 12:00)  HR: 65 (2021 10:00) (61 - 69)  BP: --  BP(mean): --  ABP: 105/67 (2021 10:00) (90/54 - 113/64)  ABP(mean): 77 (2021 10:00) (63 - 77)  RR: 20 (2021 10:00) (19 - 35)  SpO2: 99% (2021 10:00) (96% - 100%)       @ :  -   @ 07:00  --------------------------------------------------------  IN: 2693.9 mL / OUT: 1415 mL / NET: 1278.9 mL     @ 07: @ 10:49  --------------------------------------------------------  IN: 420.8 mL / OUT: 115 mL / NET: 305.8 mL  PHYSICAL EXAM: Daily   Elderly male intubated on full ventilatory support FI02 is 50% , on triple pressor support   Daily Weight in k.4 (2021 00:00)  HEENT:     + NCAT  + EOMI  - Conjuctival edema   - Icterus   - Thrush   - ETT  + NGT/OGT  Neck:         + FROM  RT IJ , LT IJ  lines JVD  - Nodes - Masses + Mid-line trachea - Tracheostomy  Chest:            normal A-P diameter    Lungs:          + CTA   + Rhonchi    - Rales    - Wheezing + Decreased  LT BS   - Dullness R L  Cardiac:       + S1 + S2    + RRR   - Irregular   - S3  - S4    - Murmurs   - Rub   - Hamman’s sign   Abdomen:    + BS  + Soft + Non-tender - Distended - Organomegaly - PEG .cholecystostomy tube in place  Extremities:   - Cyanosis U/L   - Clubbing  U/L  + LE/UE Edema   + Capillary refill    + Pulses   Neuro:        - Awake   -  Alert   - Confused   - Lethargic   + Sedated  + Generalized Weakness  Skin:        - Rashes    - Erythema   + Normal incisions   + IV sites intact          HOSPITAL MEDICATIONS: All medications reviewed and analyzed  MEDICATIONS  (STANDING):  amiodarone    Tablet 200 milliGRAM(s) Oral daily  chlorhexidine 0.12% Liquid 15 milliLiter(s) Oral Mucosa every 12 hours  chlorhexidine 2% Cloths 1 Application(s) Topical <User Schedule>  dexmedetomidine Infusion 0.5 MICROgram(s)/kG/Hr (9.81 mL/Hr) IV Continuous <Continuous>  dextrose 50% Injectable 50 milliLiter(s) IV Push every 15 minutes  heparin  Infusion 400 Unit(s)/Hr (12.5 mL/Hr) IV Continuous <Continuous>  Hydromorphone  Injectable 0.5 milliGRAM(s) IV Push once  insulin lispro (ADMELOG) corrective regimen sliding scale   SubCutaneous every 6 hours  pantoprazole  Injectable 40 milliGRAM(s) IV Push every 12 hours  piperacillin/tazobactam IVPB.. 3.375 Gram(s) IV Intermittent every 8 hours  propofol Infusion 20 MICROgram(s)/kG/Min (9.42 mL/Hr) IV Continuous <Continuous>  sodium chloride 0.9% lock flush 3 milliLiter(s) IV Push every 8 hours  sodium chloride 0.9%. 1000 milliLiter(s) (10 mL/Hr) IV Continuous <Continuous>    MEDICATIONS  (PRN):  acetaminophen    Suspension .. 650 milliGRAM(s) Oral every 6 hours PRN Temp greater or equal to 38C (100.4F)    LABS: All Lab data reviewed and analyzed                        9.1    43.19 )-----------( 330      ( 26 Sep 2021 02:23 )             28.4        135  |  99  |  41<H>  ----------------------------<  106<H>  4.6   |  21<L>  |  1.03    Ca    9.7      26 Sep 2021 02:23  Phos  3.0       Mg     1.6         TPro  8.1  /  Alb  3.5  /  TBili  3.2<H>  /  DBili  x   /  AST  50<H>  /  ALT  49<H>  /  AlkPhos  295<H>        Ca    9.8      25 Sep 2021 00:28  Phos  3.4     -  Mg     1.8         TPro  8.7<H>  /  Alb  3.2<L>  /  TBili  0.8  /  DBili  x   /  AST  43<H>  /  ALT  49<H>  /  AlkPhos  291<H>      Ca    9.7      24 Sep 2021 00:46  Phos  3.5       Mg     2.0         TPro  8.8<H>  /  Alb  3.1<L>  /  TBili  0.8  /  DBili  x   /  AST  50<H>  /  ALT  47<H>  /  AlkPhos  288<H>                                                                                                                                                          PTT - ( 2021 04:52 )  PTT:45.2 sec LIVER FUNCTIONS - ( 2021 00:42 )  Alb: 3.4 g/dL / Pro: 6.7 g/dL / ALK PHOS: 213 U/L / ALT: 15 U/L / AST: 24 U/L / GGT: x           RADIOLOGY: - Reviewed and analyzed RT Pig tail cathter  , LVAD HM2, CT scan of abdomen reviewed result noted    Abdomen soft, nontender, nondistended, bowel sounds present in all 4 quadrants.

## 2021-09-26 NOTE — PROGRESS NOTE ADULT - PROBLEM SELECTOR PLAN 4
- ID following and appreciate recommendations   - serratia bacteremia and poss acalculous cholecystitis. B/c with gram + cocci and many enterobacter Carbapenem resistant strain and now s/p per dawn drain  - also with enterobacter from sputum culture 8/13  - sputum cx 9/14 positive for serratia marcescens  - Blood Cultures negative  - s/p course of Meropenum  - worsening leukocytosis; c/w broad spectrum abx and will pan scan today, f/u culture results (would also send cx from dawn tube)

## 2021-09-26 NOTE — PROGRESS NOTE ADULT - REASON FOR ADMISSION
Anemia, , Dark Stools Anemia, , Dark Stools, Bacteria, Septic shock Anemia, , Dark Stools, Bacteremia  Septic shock

## 2021-09-26 NOTE — PROGRESS NOTE ADULT - SUBJECTIVE AND OBJECTIVE BOX
CHIEF COMPLAINT: transfered to SDU during the day time. called to see patient tachypneic vomited X5 NGT to LWS , CXR revealed Left lower lobe Pneumonia .seen by Dr Perry  brought patient  back to ICU noncompliant with Bipap despite of being on precedex drip intubated with size 7 ETT back on full vent support .        REVIEW OF SYSTEMS:  CONSTITUTIONAL: Tachypnoenic, ^ WOB agitated  EYES/ENT: No visual changes;   NECK: No pain or stiffness  RESPIRATORY: No cough, wheezing, hemoptysis;  shortness of breath  CARDIOVASCULAR: No chest pain or palpitations  GASTROINTESTINAL: No abdominal or epigastric pain. No nausea, vomiting, or hematemesis; No diarrhea or constipation. No melena or hematochezia.  GENITOURINARY: No dysuria, frequency or hematuria  NEUROLOGICAL: No numbness or weakness  SKIN: No itching, burning, rashes, or lesions   ICU Vital Signs Last 24 Hrs  T(C): 36.6 (25 Sep 2021 19:00), Max: 36.6 (25 Sep 2021 19:00)  T(F): 97.9 (25 Sep 2021 19:00), Max: 97.9 (25 Sep 2021 19:00)  HR: 144 (26 Sep 2021 01:00) (106 - 151)  BP: 103/62 (25 Sep 2021 16:31) (103/62 - 103/62)  BP(mean): 77 (25 Sep 2021 16:31) (77 - 77)  ABP: 69/62 (26 Sep 2021 01:00) (62/43 - 108/94)  ABP(mean): 65 (26 Sep 2021 01:00) (52 - 102)  RR: 20 (26 Sep 2021 01:00) (18 - 65)  SpO2: 100% (26 Sep 2021 01:00) (83% - 100%)    Mode: AC/ CMV (Assist Control/ Continuous Mandatory Ventilation), RR (machine): 20, TV (machine): 450, FiO2: 80, PEEP: 5, ITime: 1, MAP: 14, PIP: 31    09-24 @ 07:01  -  09-25 @ 07:00  --------------------------------------------------------  IN: 510.5 mL / OUT: 1150 mL / NET: -639.5 mL    09-25 @ 07:01 - 09-26 @ 01:39  --------------------------------------------------------  IN: 1150 mL / OUT: 760 mL / NET: 390 mL      CAPILLARY BLOOD GLUCOSE      POCT Blood Glucose.: 166 mg/dL (25 Sep 2021 17:57)      PHYSICAL EXAM:  General: WN/WD NAD  Neurology: A&Ox3, nonfocal, BLANDON x 4  Eyes: PERRLA/ EOMI, Gross vision intact  ENT/Neck: Neck supple, trachea midline, No JVD, Gross hearing intact  Respiratory: CTA B/L, No wheezing, rales, rhonchi  CV: RRR, S1S2, no murmurs, rubs or gallops  Abdominal: Soft, NT, ND +BS,   Extremities: No edema, + peripheral pulses  Skin: No Rashes, Hematoma, Ecchymosis  Incisions:   Tubes:  LINES:    HOSPITAL MEDICATIONS:  MEDICATIONS  (STANDING):  albumin human 25% IVPB 50 milliLiter(s) IV Intermittent every 10 minutes  chlorhexidine 0.12% Liquid 15 milliLiter(s) Oral Mucosa two times a day  chlorhexidine 2% Cloths 1 Application(s) Topical <User Schedule>  dextrose 40% Gel 15 Gram(s) Oral once  dextrose 50% Injectable 25 Gram(s) IV Push once  dextrose 50% Injectable 12.5 Gram(s) IV Push once  etomidate Injectable 40 milliGRAM(s) IV Push once  glucagon  Injectable 1 milliGRAM(s) IntraMuscular once  hydrocortisone sodium succinate Injectable 50 milliGRAM(s) IV Push every 8 hours  insulin lispro (ADMELOG) corrective regimen sliding scale   SubCutaneous every 6 hours  lidocaine   4% Patch 1 Patch Transdermal daily  methimazole 10 milliGRAM(s) Oral every 8 hours  metoclopramide Injectable 10 milliGRAM(s) IV Push every 8 hours  mirtazapine 7.5 milliGRAM(s) Oral daily  multivitamin 1 Tablet(s) Oral daily  pantoprazole  Injectable 40 milliGRAM(s) IV Push every 12 hours  piperacillin/tazobactam IVPB.. 3.375 Gram(s) IV Intermittent every 8 hours  propofol Infusion 13.528 MICROgram(s)/kG/Min (5 mL/Hr) IV Continuous <Continuous>  propranolol 40 milliGRAM(s) Oral every 8 hours  sertraline 100 milliGRAM(s) Oral daily  sodium chloride 0.9%. 1000 milliLiter(s) (10 mL/Hr) IV Continuous <Continuous>  thiamine 100 milliGRAM(s) Oral daily  vancomycin    Solution 125 milliGRAM(s) Oral every 12 hours  vasopressin Infusion 0.1 Unit(s)/Min (6 mL/Hr) IV Continuous <Continuous>  veCURonium  Injectable 10 milliGRAM(s) IV Push once    MEDICATIONS  (PRN):  acetaminophen    Suspension .. 650 milliGRAM(s) Enteral Tube every 6 hours PRN Mild Pain (1 - 3)  ondansetron Injectable 4 milliGRAM(s) IV Push every 6 hours PRN Nausea and/or Vomiting  simethicone 80 milliGRAM(s) Chew every 8 hours PRN Gas  sodium chloride 0.9% lock flush 10 milliLiter(s) IV Push every 1 hour PRN Pre/post blood products, medications, blood draw, and to maintain line patency      LABS:                        10.2   34.99 )-----------( 432      ( 25 Sep 2021 22:21 )             31.2     09-25    133<L>  |  96  |  36<H>  ----------------------------<  178<H>  4.6   |  24  |  0.46<L>    Ca    9.9      25 Sep 2021 22:21  Phos  3.4     09-25  Mg     1.8     09-25    TPro  8.9<H>  /  Alb  3.3  /  TBili  1.4<H>  /  DBili  x   /  AST  50<H>  /  ALT  56<H>  /  AlkPhos  335<H>  09-25    PTT - ( 25 Sep 2021 21:11 )  PTT:29.9 sec    Arterial Blood Gas:  09-26 @ 01:15  7.36/48/120/27/99.6/1.2  ABG lactate: --  Arterial Blood Gas:  09-25 @ 23:51  7.44/38/78/26/98.0/1.6  ABG lactate: --  Arterial Blood Gas:  09-25 @ 22:25  7.43/44/61/29/92.3/4.3  ABG lactate: --  Arterial Blood Gas:  09-25 @ 21:07  7.42/47/99/30/99.0/5.2  ABG lactate: --  Arterial Blood Gas:  09-25 @ 20:05  7.41/49/75/31/97.3/5.6  ABG lactate: --  Arterial Blood Gas:  09-25 @ 00:23  7.42/49/140/32/99.8/6.4  ABG lactate: --    Venous Blood Gas:  09-26 @ 01:33  7.33/55/41/29/64.0  VBG Lactate: --      MICROBIOLOGY:     RADIOLOGY:  [ ] Reviewed and interpreted by me    EKG: CHIEF COMPLAINT: transfered to SDU during the day time. called to see patient tachypneic vomited X5 NGT to LWS , CXR revealed Left lower lobe Pneumonia .seen by Dr Perry  brought patient  back to ICU noncompliant with Bipap despite of being on precedex drip intubated with size 7 ETT back on full vent support . Septic shock on escalating doses of pressors, panculture will start broad spectrum antibiotics C diff prophiolaxis        REVIEW OF SYSTEMS:  CONSTITUTIONAL: Sedated on Full vent support  EYES/ENT: No visual changes;   NECK: No pain or stiffness  RESPIRATORY: No cough, wheezing, hemoptysis;  shortness of breath on Vent support  CARDIOVASCULAR: No chest pain or palpitations On LVAD  GASTROINTESTINAL: diffused  abdominal or epigastric pain.nausea, vomiting, melena  GENITOURINARY: Monitor Urine oupt, insert [place]rum  NEUROLOGICAL: No numbness or weakness  SKIN: No itching, burning, rashes, or lesions   ICU Vital Signs Last 24 Hrs  T(C): 36.6 (25 Sep 2021 19:00), Max: 36.6 (25 Sep 2021 19:00)  T(F): 97.9 (25 Sep 2021 19:00), Max: 97.9 (25 Sep 2021 19:00)  HR: 144 (26 Sep 2021 01:00) (106 - 151)  BP: 103/62 (25 Sep 2021 16:31) (103/62 - 103/62)  BP(mean): 77 (25 Sep 2021 16:31) (77 - 77)  ABP: 69/62 (26 Sep 2021 01:00) (62/43 - 108/94)  ABP(mean): 65 (26 Sep 2021 01:00) (52 - 102)  RR: 20 (26 Sep 2021 01:00) (18 - 65)  SpO2: 100% (26 Sep 2021 01:00) (83% - 100%)    Mode: AC/ CMV (Assist Control/ Continuous Mandatory Ventilation), RR (machine): 20, TV (machine): 450, FiO2: 80, PEEP: 5, ITime: 1, MAP: 14, PIP: 31    09-24 @ 07:01  -  09-25 @ 07:00  --------------------------------------------------------  IN: 510.5 mL / OUT: 1150 mL / NET: -639.5 mL    09-25 @ 07:01  - 09-26 @ 01:39  --------------------------------------------------------  IN: 1150 mL / OUT: 760 mL / NET: 390 mL      CAPILLARY BLOOD GLUCOSE      POCT Blood Glucose.: 166 mg/dL (25 Sep 2021 17:57)      PHYSICAL EXAM:  General: Sedated on Vent Diprivan   Neurology: sedated nonfocal, BLANDON x 4  Eyes: PERRLA/ EOMI, Gross vision intact  ENT/Neck: Neck supple, trachea midline, No JVD, Gross hearing intact  Respiratory: CTA B/L, Rales on LLL  CV:  On LVAD  Abdominal: Soft, NT, ND +BS, Drive line intact   Extremities: No edema, + peripheral pulses  Skin: No Rashes, Hematoma, Ecchymosis    Tubes: Feeding tube, NGT   LINES: Rtij TLC, Rt axill A. line    HOSPITAL MEDICATIONS:  MEDICATIONS  (STANDING):  albumin human 25% IVPB 50 milliLiter(s) IV Intermittent every 10 minutes  chlorhexidine 0.12% Liquid 15 milliLiter(s) Oral Mucosa two times a day  chlorhexidine 2% Cloths 1 Application(s) Topical <User Schedule>  dextrose 40% Gel 15 Gram(s) Oral once  dextrose 50% Injectable 25 Gram(s) IV Push once  dextrose 50% Injectable 12.5 Gram(s) IV Push once  etomidate Injectable 40 milliGRAM(s) IV Push once  glucagon  Injectable 1 milliGRAM(s) IntraMuscular once  hydrocortisone sodium succinate Injectable 50 milliGRAM(s) IV Push every 8 hours  insulin lispro (ADMELOG) corrective regimen sliding scale   SubCutaneous every 6 hours  lidocaine   4% Patch 1 Patch Transdermal daily  methimazole 10 milliGRAM(s) Oral every 8 hours  metoclopramide Injectable 10 milliGRAM(s) IV Push every 8 hours  mirtazapine 7.5 milliGRAM(s) Oral daily  multivitamin 1 Tablet(s) Oral daily  pantoprazole  Injectable 40 milliGRAM(s) IV Push every 12 hours  piperacillin/tazobactam IVPB.. 3.375 Gram(s) IV Intermittent every 8 hours  propofol Infusion 13.528 MICROgram(s)/kG/Min (5 mL/Hr) IV Continuous <Continuous>  propranolol 40 milliGRAM(s) Oral every 8 hours  sertraline 100 milliGRAM(s) Oral daily  sodium chloride 0.9%. 1000 milliLiter(s) (10 mL/Hr) IV Continuous <Continuous>  thiamine 100 milliGRAM(s) Oral daily  vancomycin    Solution 125 milliGRAM(s) Oral every 12 hours  vasopressin Infusion 0.1 Unit(s)/Min (6 mL/Hr) IV Continuous <Continuous>  veCURonium  Injectable 10 milliGRAM(s) IV Push once    MEDICATIONS  (PRN):  acetaminophen    Suspension .. 650 milliGRAM(s) Enteral Tube every 6 hours PRN Mild Pain (1 - 3)  ondansetron Injectable 4 milliGRAM(s) IV Push every 6 hours PRN Nausea and/or Vomiting  simethicone 80 milliGRAM(s) Chew every 8 hours PRN Gas  sodium chloride 0.9% lock flush 10 milliLiter(s) IV Push every 1 hour PRN Pre/post blood products, medications, blood draw, and to maintain line patency      LABS:                        10.2   34.99 )-----------( 432      ( 25 Sep 2021 22:21 )             31.2     09-25    133<L>  |  96  |  36<H>  ----------------------------<  178<H>  4.6   |  24  |  0.46<L>    Ca    9.9      25 Sep 2021 22:21  Phos  3.4     09-25  Mg     1.8     09-25    TPro  8.9<H>  /  Alb  3.3  /  TBili  1.4<H>  /  DBili  x   /  AST  50<H>  /  ALT  56<H>  /  AlkPhos  335<H>  09-25    PTT - ( 25 Sep 2021 21:11 )  PTT:29.9 sec    Arterial Blood Gas:  09-26 @ 01:15  7.36/48/120/27/99.6/1.2  ABG lactate: --  Arterial Blood Gas:  09-25 @ 23:51  7.44/38/78/26/98.0/1.6  ABG lactate: --  Arterial Blood Gas:  09-25 @ 22:25  7.43/44/61/29/92.3/4.3  ABG lactate: --  Arterial Blood Gas:  09-25 @ 21:07  7.42/47/99/30/99.0/5.2  ABG lactate: --  Arterial Blood Gas:  09-25 @ 20:05  7.41/49/75/31/97.3/5.6  ABG lactate: --  Arterial Blood Gas:  09-25 @ 00:23  7.42/49/140/32/99.8/6.4  ABG lactate: --    Venous Blood Gas:  09-26 @ 01:33  7.33/55/41/29/64.0  VBG Lactate: --      MICROBIOLOGY: Culture - Blood (09.15.21 @ 01:21)    Specimen Source: .Blood Blood    Culture Results:   No Growth Final  Culture - Blood (09.15.21 @ 01:20)    Specimen Source: .Blood Blood    Culture Results:   No Growth Final          RADIOLOGY: The heart is normal in size. The lungs appear to be clear. No pleural effusion. No pneumothorax. A left ventricular assisted device is present. A loop recorder is overlying the left hemithorax. NG tube is in the stomach however its tip is not seen on the current study. Status poststernotomy.

## 2021-09-26 NOTE — PROGRESS NOTE ADULT - SUBJECTIVE AND OBJECTIVE BOX
Follow Up:      Interval History/ROS:Patient is a 66y old  Male who presents with a chief complaint of Anemia, Supratherapeutic INR, Dark Stools (26 Sep 2021 11:59)  developed acute aspiration event suspected with a marked leukocytosis, required reintubation and transfer back to CTU  Currently intubated and sedated    REVIEW OF SYSTEMS  [  ] ROS unobtainable because:    [ x ] All other systems negative except as noted below    Constitutional:  [ ] fever [ ] chills  [ ] weight loss  [ ]night sweat  [ ]poor appetite/PO intake [ ]fatigue   Skin:  [ ] rash [ ] phlebitis	  Eyes: [ ] icterus [ ] pain  [ ] discharge	  ENMT: [ ] sore throat  [ ] thrush [ ] ulcers [ ] exudates [ ]anosmia  Respiratory: [ ] dyspnea [ ] hemoptysis [ ] cough [ ] sputum	  Cardiovascular:  [ ] chest pain [ ] palpitations [ ] edema	  Gastrointestinal:  [ ] nausea [ ] vomiting [ ] diarrhea [ ] constipation [ ] pain	  Genitourinary:  [ ] dysuria [ ] frequency [ ] hematuria [ ] discharge [ ] flank pain  [ ] incontinence  Musculoskeletal:  [ ] myalgias [ ] arthralgias [ ] arthritis  [ ] back pain  Neurological:  [ ] headache [ ] weakness [ ] seizures  [ ] confusion/altered mental status    Allergies  Amiodarone Hydrochloride (Other (Severe))        ANTIMICROBIALS:    piperacillin/tazobactam IVPB.. 3.375 every 8 hours  vancomycin    Solution 125 every 6 hours  vancomycin  IVPB 1000 every 12 hours      ANTIMICROBIALS (past 90 days):  MEDICATIONS  (STANDING):  OTHER MEDS: MEDICATIONS  (STANDING):  acetaminophen    Suspension .. 650 every 6 hours PRN  dextrose 40% Gel 15 once  dextrose 50% Injectable 25 once  dextrose 50% Injectable 12.5 once  glucagon  Injectable 1 once  hydrocortisone sodium succinate Injectable 100 every 8 hours  insulin lispro (ADMELOG) corrective regimen sliding scale  every 6 hours  lidocaine   Infusion 0.5 <Continuous>  methimazole 10 every 8 hours  metoclopramide Injectable 10 every 8 hours  norepinephrine Infusion 0.05 <Continuous>  ondansetron Injectable 4 every 6 hours PRN  pantoprazole  Injectable 40 every 12 hours  phenylephrine    Infusion 4.329 <Continuous>  propofol Infusion 13.528 <Continuous>  simethicone 80 every 8 hours PRN  vasopressin Infusion 0.1 <Continuous>      Vital Signs Last 24 Hrs  T(F): 99.1 (21 @ 12:00), Max: 99.1 (21 @ 12:00)    Vital Signs Last 24 Hrs  HR: 92 (21 @ 14:15) (88 - 151)  BP: 103/62 (21 @ 16:31) (103/62 - 103/62)  RR: 20 (21 @ 14:15)  SpO2: 100% (21 @ 14:15) (83% - 100%)  Wt(kg): --    EXAM:    GA: NAD  HEENT: intubated  CV: VADs ounds  Lungs: Coarse  Abd: cholecystotomy tube  Ext: no edema  Neuro: No focal deficits  Skin: Intact  IV: no phlebitis    Labs:                        9.1    43.19 )-----------( 330      ( 26 Sep 2021 02:23 )             28.4         135  |  99  |  41<H>  ----------------------------<  106<H>  4.6   |  21<L>  |  1.03    Ca    9.7      26 Sep 2021 02:23  Phos  3.0       Mg     1.6         TPro  8.1  /  Alb  3.5  /  TBili  3.2<H>  /  DBili  x   /  AST  50<H>  /  ALT  49<H>  /  AlkPhos  295<H>        WBC Trend:  WBC Count: 43.19 (21 @ 02:23)  WBC Count: 34.99 (21 @ 22:21)  WBC Count: 13.58 (21 @ 00:28)  WBC Count: 12.51 (21 @ 00:46)      Creatine Trend:  Creatinine, Serum: 1.03 ()  Creatinine, Serum: 0.46 ()  Creatinine, Serum: 0.39 ()  Creatinine, Serum: 0.38 ()      Liver Biochemical Testing Trend:  Alanine Aminotransferase (ALT/SGPT): 49 *H* ()  Alanine Aminotransferase (ALT/SGPT): 56 *H* ()  Alanine Aminotransferase (ALT/SGPT): 49 *H* ()  Alanine Aminotransferase (ALT/SGPT): 47 *H* ()  Alanine Aminotransferase (ALT/SGPT): 35 ()  Aspartate Aminotransferase (AST/SGOT): 50 (21 @ 02:23)  Aspartate Aminotransferase (AST/SGOT): 50 (21 @ 22:21)  Aspartate Aminotransferase (AST/SGOT): 43 (21 @ 00:28)  Aspartate Aminotransferase (AST/SGOT): 50 (21 @ 00:46)  Aspartate Aminotransferase (AST/SGOT): 34 (21 @ 01:15)  Bilirubin Total, Serum: 3.2 ()  Bilirubin Total, Serum: 1.4 ()  Bilirubin Total, Serum: 0.8 ()  Bilirubin Total, Serum: 0.8 ()  Bilirubin Total, Serum: 0.7 ()      Trend LDH  21 @ 02:23  484<H>  21 @ 00:28  407<H>  21 @ 00:46  439<H>  21 @ 01:15  400<H>  21 @ 01:05  373<H>      Urinalysis Basic - ( 26 Sep 2021 03:33 )    Color: Light Yellow / Appearance: Slightly Turbid / S.007 / pH: x  Gluc: x / Ketone: Negative  / Bili: Negative / Urobili: Negative   Blood: x / Protein: Trace / Nitrite: Negative   Leuk Esterase: Moderate / RBC: 110 /hpf / WBC 26 /HPF   Sq Epi: x / Non Sq Epi: 7 /hpf / Bacteria: Negative        MICROBIOLOGY:    MRSA PCR Result.: Madhu (21 @ 23:02)      Culture - Blood (collected 15 Sep 2021 01:21)  Source: .Blood Blood  Final Report:    No Growth Final    Culture - Blood (collected 15 Sep 2021 01:20)  Source: .Blood Blood  Final Report:    No Growth Final    Culture - Sputum (collected 14 Sep 2021 16:27)  Source: .Sputum Sputum  Final Report:    Moderate Serratia marcescens    Normal Respiratory Bria present  Organism: Serratia marcescens  Organism: Serratia marcescens    Sensitivities:      -  Amikacin: S <=16      -  Amoxicillin/Clavulanic Acid: R >16/8      -  Ampicillin: I 16 These ampicillin results predict results for amoxicillin      -  Ampicillin/Sulbactam: I 16/8 Enterobacter, Citrobacter, and Serratia may develop resistance during prolonged therapy (3-4 days)      -  Aztreonam: S <=4      -  Cefazolin: R >16 Enterobacter, Citrobacter, and Serratia may develop resistance during prolonged therapy (3-4 days)      -  Cefepime: S <=2      -  Cefoxitin: S <=8      -  Ceftriaxone: S <=1 Enterobacter, Citrobacter, and Serratia may develop resistance during prolonged therapy      -  Ciprofloxacin: S <=0.25      -  Ertapenem: S <=0.5      -  Gentamicin: S <=2      -  Levofloxacin: S <=0.5      -  Meropenem: S <=1      -  Piperacillin/Tazobactam: S <=8      -  Tobramycin: S <=2      -  Trimethoprim/Sulfamethoxazole: S <=0.5/9.5      Method Type: CLAU    Culture - Blood (collected 14 Sep 2021 15:13)  Source: .Blood Blood  Final Report:    No Growth Final    Culture - Blood (collected 13 Sep 2021 18:59)  Source: .Blood Blood  Final Report:    No Growth Final    Culture - Sputum (collected 08 Sep 2021 14:58)  Source: .Sputum Sputum  Final Report:    Normal Respiratory Bria present    Culture - Sputum (collected 07 Sep 2021 18:32)  Source: .Sputum Sputum  Final Report:    Normal Respiratory Bria present    Culture - Sputum (collected 02 Sep 2021 16:29)  Source: .Sputum Sputum  Final Report:    Normal Respiratory Bria present    Culture - Blood (collected 02 Sep 2021 04:56)  Source: .Blood Blood-Peripheral  Final Report:    No Growth Final    Culture - Body Fluid with Gram Stain (collected 27 Aug 2021 14:30)  Source: .Body Fluid Cholecystostomy drain  Final Report:    No growth at 5 days      HIV-1/2 Combo Result: Nonreact (21 @ 08:18)    ABS CD4: 129 /uL (20 @ 08:38)                    COVID-19 PCR: NotDetec (21 @ 17:52)    COVID-19 [place] Domain AB Interp: Positive (06-15-21 @ 10:15)  COVID-19 IgG Antibody Interpretation: Positive (21 @ 08:55)            Lactate Dehydrogenase, Serum: 484 ()  Lactate Dehydrogenase, Serum: 407 ()  Lactate Dehydrogenase, Serum: 439 ()  Lactate Dehydrogenase, Serum: 400 ()  Lactate Dehydrogenase, Serum: 373 ()  Lactate Dehydrogenase, Serum: 366 ()  Lactate Dehydrogenase, Serum: 372 ()        Blood Gas Arterial, Lactate: 2.0 ( @ 12:36)  Blood Gas Arterial, Lactate: 1.8 ( @ 06:50)  Blood Gas Arterial, Lactate: 1.6 ( @ 04:13)  Blood Gas Venous - Lactate: 1.5 ( @ 04:13)      RADIOLOGY:  imaging below personally reviewed        < from: Xray Chest 1 View- PORTABLE-Urgent (Xray Chest 1 View- PORTABLE-Urgent .) (21 @ 06:51) >  FINDINGS:    Interval placement of a left IJ approach Dema-Tariq catheter with tip in the right pulmonary artery. Right central venous catheter with tip in SVC. ET tube is above the bud. Enteric tube seen coursing under the diaphragm with tip off the image. LVAD in place.  The heart is normal in size. Status post median sternotomy and valvuloplasty. Loop recorder in the left chest.  Pacer pad overlies the right mid chest. Unchanged left lower lung patchy opacity.  There is no pneumothorax or pleural effusion.    IMPRESSION:  Interval placement of a left IJ approach Dema-Tariq catheter with tip in the right pulmonary artery.    < end of copied text >

## 2021-09-26 NOTE — PROGRESS NOTE ADULT - ASSESSMENT
65 YO M with a history of stage D NICM s/p HM2 on 9/2017 as DT (due to severe PAD) with TV ring, prior COVID-19 infection 4/2020, recurrent syncope post LVAD s/p ILR, and chronic abdominal pain with prior negative workup who was admitted 6/14/21 with symptomatic anemia with Hgb 4.5 in setting of INR 8.8 without hemodynamic instability and has since had a protracted hospitalization. He was transfused and underwent VCE which showed active bleeding in the mid small bowel but subsequent enteroscopy 6/15 did not reveal any active bleeding. He acutely decompensated after procedure with fever/hypertension, low flow alarms, and pulmonary infiltrate with hypoxia requiring intubation from probable aspiration PNA. He was unable to extubated and has since undergone tracheostomy but tolerating persistent trach collar and nearing ability for decannulation. 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 He underwent angiogram on 9/10, stent was unable to be placed and course was then complicated by RP bleed. He continued to have persistent leukocytosis and febrile episodes and was noted to have positive sputum culture for serratia marcescens. Since stopping his antibiotics his leukocytosis has worsened, remains afebrile however will be placed on IV Zosyn. Low threshold to repeat cultures and scan patient.     He was decannulated on 9/23 and was doing well, but last night had 5 episodes of vomiting and went into acute respiratory distress, now intubated. He was also having low flow alarms and hypotensive requiring fluid resuscitation, currently on 3 pressors. He has a slight RASHAWN and worsening leukocytosis, but afebrile.

## 2021-09-26 NOTE — PROGRESS NOTE ADULT - PROBLEM SELECTOR PLAN 3
- Chronic in nature with questionable SMA stenosis on imaging and mesenteric duplex difficult to interpret with continuous flow. Underwent angiogram on 9/11 which showed SMA stenosis. Unable to place stent. Would hold off on repeating angiogram at this time.   - Feeds currently on hold in the setting of vomiting  - Will need to discuss with GI about possibly undergoing PEGJ tube placed  - Discussed cholecystostomy with general surgery, unlikely to derive symptomatic benefit since perc dawn tube continues to drain. Repeat RUQ shows contracted gallbladder. Per general surgery holding off on removal of drain at this time.

## 2021-09-26 NOTE — PROGRESS NOTE ADULT - ATTENDING COMMENTS
67 YO M with a history of stage D NICM s/p HM2 on 9/2017 as DT (due to severe PAD) with TV ring, prior COVID-19 infection 4/2020, recurrent syncope post LVAD s/p ILR, and chronic abdominal pain who was admitted June 2021 for GI bleeding who has had a long and complicated course.  - Unfortunately the patient had multiple episodes of vomiting last night and ended up getting intubated. He also needed a central line because he became hypotensive. His WBC count went up to 43. Agree with starting broad spectrum antibiotics. Would get a pan CT  - In regards to cholecystostomy tube, general surgery has evaluated the patient and they state they would prefer to do chronic tube management rather than removing his gall bladder  - now on low dose heparin for rising LDH

## 2021-09-26 NOTE — PROGRESS NOTE ADULT - ASSESSMENT
Stage D Nonischemic Cardiomyopathy, Status Post HM2 on 9/2017    Cardiogenic shock  Hemodynamic instability   Acute hypoxemic respiratory failure s/p trach 6/25  GI bleed , Status Post Enteroscopy 6/14  Anemia, in setting of melena   Chronic Kidney Disease  Stress hyperglycemia   C.diff positive on 6/20   Hypovolemic shock  Septic shock  Leukocytosis  GB thickening/percholecystic s/p perc choley by IT 7/30  SMA stenosis  Serratia/citrobacter pneumonia 7/7  Stenotrophomonas pneumonia 8/1  Enterobacter pneumonia 8/13  Nasuea/vomiting  Deconditioning  Hyponatremia  9/26 Reintubated , leukocytosis, Penny, Vomited x5, Bacteremia   Plan:  ====================== NEUROLOGY=====================  continue to monitor neuro status    Continue ambulation as tolerated   Tylenol PRN for analgesia   C/w mirtazapine and sertraline for depression  Deconditioned, PT/Ambulate as tolerated    acetaminophen   Tablet .. 650 milliGRAM(s) Oral every 6 hours PRN Mild Pain (1 - 3)  ==================== RESPIRATORY======================  cute hypoxemic respiratory failure s/p #8 shiley trach on 6/25  Tracheostomy decannulated . 9/26 ^ WOB, tachypnoenic, Reintubated on vent support, sedated. Tracheostomy Stoma pin point.  ====================CARDIOVASCULAR==================  Stage D Nonischemic Cardiomyopathy, Status Post HM2 on 9/2017; LVAD settings 9200, flow 5.7  TTE 7/26: reveals at least moderate MR, mild AI, severe global LV syst dysfxn, dec RV fxn   Propranolol for rate control of HR 2/2 Hyperthyroidism, titrate as tolerated per Endo  Continue invasive hemodynamic monitoring       ===================HEMATOLOGIC/ONC ===================  CTA A/P on 9/12 +L RP hematoma   Acute blood loss anemia, monitor H&H/Plts    GI bleed melanotic stools Dc heparin drip    VTE prophylaxis, heparin  Infusion 500 Unit(s)/Hr (5 mL/Hr) IV Continuous <Continuous>    ===================== RENAL =========================  Continue monitoring urine output, I&OS, BUN/Cr , urology consult for aleman insertion.  Euvolemic, even fluid balance, currently off diuretics.    Replete lytes PRN. Keep K> 4 and Mg >2     ==================== GASTROINTESTINAL===================  S/p cholecystostomy tube placed on 7/30 with IR  Vomited x6 times NGT on Hold  CTA A/P 8/13: Evaluation of the mesenteric vessels is limited by streak artifact from LVAD. There appears to be severe stenosis of the proximal SMA; abdominal mesenteric doppler is recommended for further evaluation. 2.  No small bowel findings to suggest acute mesenteric ischemia. 3.  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  Mesenteric duplex on 8/15 borderline stenosis of proximal SMA, s/p failed SMA stent, L fem RP hematoma on 9/10, s/p 3U PRBC.   Reglan for gut motility  Zofran PRN nausea/vomiting  No intervention for cholecystostomy tube, PEJ  S&S eval, ice chips PRN, FEES Monday    multivitamin 1 Tablet(s) Oral daily  GI prophylaxis, pantoprazole  Injectable 40 milliGRAM(s) IV Push every 12 hours  simethicone 80 milliGRAM(s) Chew every 8 hours PRN Gas  sodium chloride 0.9% lock flush 10 milliLiter(s) IV Push every 1 hour PRN Pre/post blood products, medications, blood draw, and to maintain line patency  sodium chloride 0.9%. 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 - Free T4 remains elevated   Per endocrine     Hypotensive on Pressors       - c/w Methimazole       - s/p hydrocortisone 100 IVSS Q8hrly    dextrose 40% Gel 15 Gram(s) Oral once  dextrose 50% Injectable 25 Gram(s) IV Push once  dextrose 50% Injectable 12.5 Gram(s) IV Push once  glucagon  Injectable 1 milliGRAM(s) IntraMuscular once  insulin lispro (ADMELOG) corrective regimen sliding scale   SubCutaneous every 6 hours  methimazole 10 milliGRAM(s) Oral every 8 hours    ========================INFECTIOUS DISEASE================  Leukocytosis WBC 34   Continue trending WBC and monitoring fever curve  Zosyn added empirically and PO Vanco for possibly recurrent aspiration LLL PNA Vanco 1000mg ivv BID, Diflucan 100mg ivss daily, ID follow up    piperacillin/tazobactam IVPB.. 3.375 Gram(s) IV Intermittent every 8 hours  vancomycin    Solution 125 milliGRAM(s) Oral every 12 hours

## 2021-09-26 NOTE — PROCEDURE NOTE - ADDITIONAL PROCEDURE DETAILS
Under sterile conditions and with proper draping of the patient, a pulmonary artery catheter was floated through the introducer catheter in the ____left Ij______ vein. With the ballon inflated with 1.5ml of air, the PA catheter was advanced while monitoring the pressure tracing from the central venous system to the right ventricle and to the pulmonary artery. Wedge tracing was observed at _50____ cm. The balloon was deflated, PA pressure tracing was noted again. The position of the catheter was verified by CXR. The initial readings are:  -CVP=   5  mmHg  -PA sys/rogers=25/11     mmHg  -PCWP=     mmHg  -C.O. =4.6.    lit/min  -C.I. = 2.4     lit/min/m^2    Complications:  none

## 2021-09-26 NOTE — PROGRESS NOTE ADULT - ASSESSMENT
Assessment and Recommendation:   · Assessment	  Assessment and recommendation :  Recurrent Acute hypoxic respiratory Failure reintubated on full ventilator support FI02 is 50%  Acute blood loss anemia S/P multiple  blood transfusion   recurrent  septic shock on  vasopressin , levophed and norsynephrine   hemorrhagic shock receiving 2 unit of blood transfusion   pan culture on IV vancomycin , po vancomycin and Zosyn   S/P cholecystostomy tube placement by IR    AF RVR back to regular sinus Rhythm   Non ischemic cardiomyopathy continue ACE inhibitor and B-Blockers   mesenteric ischemia failure to stent SMA , no plan for repeated trial   S/P 3 unit of blood transfusion   S/P Septic shock and cardiogenic shock   Stage D systolic heart failure S/P LVAD HM2   MH2 LVAD  with  TV Annuloplasty  Severe peripheral vascular disease   severe hyperglycemia on insulin coverage    Reglan 10 mg for Gastric Motility   hyperthyroidism on Methimazole 10mg TID   critical care polyneuropathy   Anemia of Acute blood Loss   severe protein caloric malnutrition   Chronic kidney disease stage III  Depressed and withdrawn   off   NGT feeding NPO   GI prophylaxis with PPI   Discussed with TCV team   critical care time is 55 minutes

## 2021-09-26 NOTE — PROGRESS NOTE ADULT - PROBLEM SELECTOR PLAN 7
- endocrine recs appreciated  - currently on methimazole  - Steroid taper completed  - Continue to monitor TFT post angiogram

## 2021-09-27 NOTE — PROGRESS NOTE ADULT - ATTENDING COMMENTS
Weekend events noted with nausea/emesis complicated by hypotension requiring pressors and intubation. Pressor requirement decreasing and remains chronically ill, nl JVP, LVAD hum, nontender abdomen, no pedal edema. Labs reviewed.   - abx as above  - c/w pall care and psychiatry for GOC/capacity; arrange family mtg  - patient previously declined PEG/PEJ or other surgeries  - prognosis guarded

## 2021-09-27 NOTE — PROGRESS NOTE ADULT - SUBJECTIVE AND OBJECTIVE BOX
INFECTIOUS DISEASES FOLLOW UP-- Anitra Prater  [phone]    This is a follow up note for this  66yMale with  Anemia    Acute cholecystitis        ROS:  CONSTITUTIONAL:  No fever, good appetite  CARDIOVASCULAR:  No chest pain or palpitations  RESPIRATORY:  No dyspnea  GASTROINTESTINAL:  No nausea, vomiting, diarrhea, or abdominal pain  GENITOURINARY:  No dysuria  NEUROLOGIC:  No headache,     Allergies    Amiodarone Hydrochloride (Other (Severe))    Intolerances        ANTIBIOTICS/RELEVANT:  antimicrobials  fluconAZOLE IVPB 100 milliGRAM(s) IV Intermittent every 24 hours  piperacillin/tazobactam IVPB.. 3.375 Gram(s) IV Intermittent every 8 hours  vancomycin    Solution 125 milliGRAM(s) Oral every 6 hours  vancomycin  IVPB 1000 milliGRAM(s) IV Intermittent every 12 hours    immunologic:    OTHER:  acetaminophen    Suspension .. 650 milliGRAM(s) Enteral Tube every 6 hours PRN  chlorhexidine 0.12% Liquid 15 milliLiter(s) Oral Mucosa two times a day  chlorhexidine 2% Cloths 1 Application(s) Topical <User Schedule>  dextrose 50% Injectable 25 Gram(s) IV Push once  hydrocortisone sodium succinate Injectable 100 milliGRAM(s) IV Push every 8 hours  insulin lispro (ADMELOG) corrective regimen sliding scale   SubCutaneous every 6 hours  insulin regular Infusion 2 Unit(s)/Hr IV Continuous <Continuous>  lidocaine   4% Patch 1 Patch Transdermal daily  lidocaine   Infusion 0.5 mG/Min IV Continuous <Continuous>  methimazole 10 milliGRAM(s) Oral every 8 hours  metoclopramide Injectable 10 milliGRAM(s) IV Push every 8 hours  metoprolol tartrate Injectable 2.5 milliGRAM(s) IV Push every 6 hours  multivitamin 1 Tablet(s) Oral daily  norepinephrine Infusion 0.05 MICROgram(s)/kG/Min IV Continuous <Continuous>  ondansetron Injectable 4 milliGRAM(s) IV Push every 6 hours PRN  pantoprazole  Injectable 40 milliGRAM(s) IV Push every 12 hours  propofol Infusion 13.528 MICROgram(s)/kG/Min IV Continuous <Continuous>  simethicone 80 milliGRAM(s) Chew every 8 hours PRN  sodium chloride 0.9% lock flush 10 milliLiter(s) IV Push every 1 hour PRN  sodium chloride 0.9%. 1000 milliLiter(s) IV Continuous <Continuous>  thiamine 100 milliGRAM(s) Oral daily  vasopressin Infusion 0.1 Unit(s)/Min IV Continuous <Continuous>      Objective:  Vital Signs Last 24 Hrs  T(C): 36.3 (27 Sep 2021 16:00), Max: 37.8 (26 Sep 2021 20:00)  T(F): 97.3 (27 Sep 2021 16:00), Max: 100 (26 Sep 2021 20:00)  HR: 79 (27 Sep 2021 18:45) (77 - 107)  BP: --  BP(mean): --  RR: 20 (27 Sep 2021 18:45) (20 - 29)  SpO2: 100% (27 Sep 2021 18:45) (96% - 100%)    PHYSICAL EXAM:  Constitutional:no acute distress  Eyes:ALONSO, EOMI  Ear/Nose/Throat: no oral lesions, 	  Respiratory: clear BL  Cardiovascular: S1S2  Gastrointestinal:soft, (+) BS, no tenderness  Extremities:no e/e/c  No Lymphadenopathy  IV sites not inflammed.    LABS:                        8.5    36.59 )-----------( 145      ( 27 Sep 2021 00:28 )             25.5         137  |  102  |  28<H>  ----------------------------<  221<H>  4.7   |  18<L>  |  0.52    Ca    10.2      27 Sep 2021 00:28  Phos  2.6       Mg     2.1         TPro  7.7  /  Alb  4.7  /  TBili  2.5<H>  /  DBili  x   /  AST  26  /  ALT  26  /  AlkPhos  147<H>      PTT - ( 25 Sep 2021 21:11 )  PTT:29.9 sec  Urinalysis Basic - ( 26 Sep 2021 03:33 )    Color: Light Yellow / Appearance: Slightly Turbid / S.007 / pH: x  Gluc: x / Ketone: Negative  / Bili: Negative / Urobili: Negative   Blood: x / Protein: Trace / Nitrite: Negative   Leuk Esterase: Moderate / RBC: 110 /hpf / WBC 26 /HPF   Sq Epi: x / Non Sq Epi: 7 /hpf / Bacteria: Negative        MICROBIOLOGY:            RECENT CULTURES:   @ 03:25  .Blood Blood-Peripheral  --  --  --    No growth to date.  --      RADIOLOGY & ADDITIONAL STUDIES: INFECTIOUS DISEASES FOLLOW UP-- Anitra Prater  [phone]    This is a follow up note for this  66yMale with  Anemia    Acute cholecystitis        ROS:  CONSTITUTIONAL: intubated sedated  open prior trach site with secretions    Allergies    Amiodarone Hydrochloride (Other (Severe))    Intolerances        ANTIBIOTICS/RELEVANT:  antimicrobials  fluconAZOLE IVPB 100 milliGRAM(s) IV Intermittent every 24 hours  piperacillin/tazobactam IVPB.. 3.375 Gram(s) IV Intermittent every 8 hours  vancomycin    Solution 125 milliGRAM(s) Oral every 6 hours  vancomycin  IVPB 1000 milliGRAM(s) IV Intermittent every 12 hours    immunologic:    OTHER:  acetaminophen    Suspension .. 650 milliGRAM(s) Enteral Tube every 6 hours PRN  chlorhexidine 0.12% Liquid 15 milliLiter(s) Oral Mucosa two times a day  chlorhexidine 2% Cloths 1 Application(s) Topical <User Schedule>  dextrose 50% Injectable 25 Gram(s) IV Push once  hydrocortisone sodium succinate Injectable 100 milliGRAM(s) IV Push every 8 hours  insulin lispro (ADMELOG) corrective regimen sliding scale   SubCutaneous every 6 hours  insulin regular Infusion 2 Unit(s)/Hr IV Continuous <Continuous>  lidocaine   4% Patch 1 Patch Transdermal daily  lidocaine   Infusion 0.5 mG/Min IV Continuous <Continuous>  methimazole 10 milliGRAM(s) Oral every 8 hours  metoclopramide Injectable 10 milliGRAM(s) IV Push every 8 hours  metoprolol tartrate Injectable 2.5 milliGRAM(s) IV Push every 6 hours  multivitamin 1 Tablet(s) Oral daily  norepinephrine Infusion 0.05 MICROgram(s)/kG/Min IV Continuous <Continuous>  ondansetron Injectable 4 milliGRAM(s) IV Push every 6 hours PRN  pantoprazole  Injectable 40 milliGRAM(s) IV Push every 12 hours  propofol Infusion 13.528 MICROgram(s)/kG/Min IV Continuous <Continuous>  simethicone 80 milliGRAM(s) Chew every 8 hours PRN  sodium chloride 0.9% lock flush 10 milliLiter(s) IV Push every 1 hour PRN  sodium chloride 0.9%. 1000 milliLiter(s) IV Continuous <Continuous>  thiamine 100 milliGRAM(s) Oral daily  vasopressin Infusion 0.1 Unit(s)/Min IV Continuous <Continuous>      Objective:  Vital Signs Last 24 Hrs  T(C): 36.3 (27 Sep 2021 16:00), Max: 37.8 (26 Sep 2021 20:00)  T(F): 97.3 (27 Sep 2021 16:00), Max: 100 (26 Sep 2021 20:00)  HR: 79 (27 Sep 2021 18:45) (77 - 107)  BP: --  BP(mean): --  RR: 20 (27 Sep 2021 18:45) (20 - 29)  SpO2: 100% (27 Sep 2021 18:45) (96% - 100%)    PHYSICAL EXAM:  Constitutional: sedated  Eyes:ALONSO, EOMI  Ear/Nose/Throat: intubated, open prior trach site with purulent secretions	  Respiratory: coarse BS  Cardiovascular: VAD sounds  Gastrointestinal:soft, (+) BS, no tenderness  VAD site dressing CDI  Extremities:no e/e/c  No Lymphadenopathy  IV sites not inflammed.    LABS:                        8.5    36.59 )-----------( 145      ( 27 Sep 2021 00:28 )             25.5         137  |  102  |  28<H>  ----------------------------<  221<H>  4.7   |  18<L>  |  0.52    Ca    10.2      27 Sep 2021 00:28  Phos  2.6       Mg     2.1         TPro  7.7  /  Alb  4.7  /  TBili  2.5<H>  /  DBili  x   /  AST  26  /  ALT  26  /  AlkPhos  147<H>      PTT - ( 25 Sep 2021 21:11 )  PTT:29.9 sec  Urinalysis Basic - ( 26 Sep 2021 03:33 )    Color: Light Yellow / Appearance: Slightly Turbid / S.007 / pH: x  Gluc: x / Ketone: Negative  / Bili: Negative / Urobili: Negative   Blood: x / Protein: Trace / Nitrite: Negative   Leuk Esterase: Moderate / RBC: 110 /hpf / WBC 26 /HPF   Sq Epi: x / Non Sq Epi: 7 /hpf / Bacteria: Negative        MICROBIOLOGY:    < from: Xray Chest 1 View- PORTABLE-Routine (Xray Chest 1 View- PORTABLE-Routine in AM.) (21 @ 02:24) >  IMPRESSION:    The heart is normal in size. Left lower lobe pneumonia cannot be ruled out entirely. No pleural effusion. No pneumothorax. All life supporting devices in good position and unchanged when compared to previous study done on 2021 at 6:49 AM.    < end of copied text >    Culture - Blood (21 @ 03:25)    Specimen Source: .Blood Blood-Peripheral    Culture Results:   No growth to date.          RECENT CULTURES:   @ 03:25  .Blood Blood-Peripheral  --  --  --    No growth to date.  --      RADIOLOGY & ADDITIONAL STUDIES:    < from: CT Abdomen and Pelvis w/ IV Cont (21 @ 13:38) >  IMPRESSION:  Progressive left upper lobe tree-in-bud opacities and left upper and lower lobe consolidative opacities, concerning for multifocal pneumonia.    Slight interval decrease in left retroperitoneal hematoma.    < end of copied text >

## 2021-09-27 NOTE — PROGRESS NOTE ADULT - ASSESSMENT
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 coli UTIs, driveline drainage (1/7/21) and COVID-19 (back in April 2020)  He was recently seen in clinic where he complained of abdominal pain and dark stools w constipation back in May. He presents to [facility] ER today weakness and fatigue, moderate and + Black stools for three days, on coumadin secondary to warfarin use in the setting of an LVAD. Patient has required transfusions for GIB in the past. Mostly recently back in jan 2021 pt had anemia with dark stools. No interventions was done at that time. However Last Endoscopy was done in July 2020 (negative). Today labs show patient is anemic with H/H of 4.5/16.3,. INR is 8.84 MAP in the 90s,   Returned from endoscopy appearing ill tachypneic with chills with new white out of his left lung      A/p  s/p LVAD placement  admission prolonged following GI bleeding, aspiration event, CDI, VAP, chronic abdominal pain, most recently with retroperitoneal bleeding post attempted stent placement  Acute event while on the floor with possible aspiration and required re-intubation and transfer to cTU  Agree with Vanco and Zosyn  Vanco trough 9/27 therapeutic  oral Vanco in the setting if severe prior C.diff infection    Would send sputum from open trach site or deep suction from ET tube- suspect patient has MDRO organism based upon significant persistent leukocytosis  Will consider broadening coverage from Zosyn to Meropenem   GOC discussion with family    prognosis poor    Discussed with CTU    Morris Prater MD  [phone]  After 5pm/weekends [phone]

## 2021-09-27 NOTE — PROGRESS NOTE ADULT - PROBLEM SELECTOR PLAN 3
- Chronic in nature with questionable SMA stenosis on imaging and mesenteric duplex difficult to interpret with continuous flow. Underwent angiogram on 9/11 which showed SMA stenosis. Unable to place stent. Would hold off on repeating angiogram at this time.   - Feeds currently on hold in the setting of vomiting  - Discussed cholecystostomy with general surgery, unlikely to derive symptomatic benefit since perc dawn tube continues to drain. Repeat RUQ shows contracted gallbladder. Per general surgery holding off on removal of drain at this time.

## 2021-09-27 NOTE — PROGRESS NOTE ADULT - ASSESSMENT
Assessment and Recommendation:   · Assessment	  Assessment and recommendation :  Recurrent Acute hypoxic respiratory Failure reintubated on full ventilator support FI02 is 40%  Acute blood loss anemia S/P multiple  blood transfusion   recurrent  septic shock on  vasopressin ,   hemorrhagic shock receiving 3 unit of blood transfusion   pan culture on IV vancomycin , po vancomycin and Zosyn and Diflucan   S/P cholecystostomy tube placement by IR    AF RVR back to regular sinus Rhythm   Non ischemic cardiomyopathy continue ACE inhibitor and B-Blockers   mesenteric ischemia failure to stent SMA , no plan for repeated trial   S/P 3 unit of blood transfusion   S/P Septic shock and cardiogenic shock   Stage D systolic heart failure S/P LVAD HM2   MH2 LVAD  with  TV Annuloplasty  Severe peripheral vascular disease   severe hyperglycemia on insulin coverage    Reglan 10 mg for Gastric Motility   hyperthyroidism on Methimazole 10mg TID   critical care polyneuropathy   Anemia of Acute blood Loss   severe protein caloric malnutrition   Chronic kidney disease stage III  Depressed and withdrawn   off   NGT feeding NPO   GI prophylaxis with PPI   Discussed with TCV team   critical care time is 55 minutes

## 2021-09-27 NOTE — PROGRESS NOTE ADULT - SUBJECTIVE AND OBJECTIVE BOX
Subjective: Patient seen and examined resting in bed. Remains intubated and sedated    Medications:  acetaminophen    Suspension .. 650 milliGRAM(s) Enteral Tube every 6 hours PRN  chlorhexidine 0.12% Liquid 15 milliLiter(s) Oral Mucosa two times a day  chlorhexidine 2% Cloths 1 Application(s) Topical <User Schedule>  dextrose 50% Injectable 25 Gram(s) IV Push once  fluconAZOLE IVPB 100 milliGRAM(s) IV Intermittent every 24 hours  hydrocortisone sodium succinate Injectable 100 milliGRAM(s) IV Push every 8 hours  insulin lispro (ADMELOG) corrective regimen sliding scale   SubCutaneous every 6 hours  insulin regular Infusion 2 Unit(s)/Hr IV Continuous <Continuous>  lidocaine   4% Patch 1 Patch Transdermal daily  lidocaine   Infusion 0.5 mG/Min IV Continuous <Continuous>  methimazole 10 milliGRAM(s) Oral every 8 hours  metoclopramide Injectable 10 milliGRAM(s) IV Push every 8 hours  metoprolol tartrate Injectable 2.5 milliGRAM(s) IV Push every 6 hours  multivitamin 1 Tablet(s) Oral daily  norepinephrine Infusion 0.05 MICROgram(s)/kG/Min IV Continuous <Continuous>  ondansetron Injectable 4 milliGRAM(s) IV Push every 6 hours PRN  pantoprazole  Injectable 40 milliGRAM(s) IV Push every 12 hours  piperacillin/tazobactam IVPB.. 3.375 Gram(s) IV Intermittent every 8 hours  propofol Infusion 13.528 MICROgram(s)/kG/Min IV Continuous <Continuous>  simethicone 80 milliGRAM(s) Chew every 8 hours PRN  sodium chloride 0.9% lock flush 10 milliLiter(s) IV Push every 1 hour PRN  sodium chloride 0.9%. 1000 milliLiter(s) IV Continuous <Continuous>  thiamine 100 milliGRAM(s) Oral daily  vancomycin    Solution 125 milliGRAM(s) Oral every 6 hours  vancomycin  IVPB 1000 milliGRAM(s) IV Intermittent every 12 hours  vasopressin Infusion 0.1 Unit(s)/Min IV Continuous <Continuous>      ICU Vital Signs Last 24 Hrs  T(C): 36.5 (27 Sep 2021 12:00), Max: 38.1 (26 Sep 2021 16:00)  T(F): 97.7 (27 Sep 2021 12:00), Max: 100.6 (26 Sep 2021 16:00)  HR: 83 (27 Sep 2021 13:30) (77 - 108)  BP: --  BP(mean): --  ABP: 86/73 (27 Sep 2021 13:30) (76/65 - 115/80)  ABP(mean): 79 (27 Sep 2021 13:30) (70 - 98)  RR: 20 (27 Sep 2021 13:30) (20 - 29)  SpO2: 99% (27 Sep 2021 13:30) (96% - 100%)    Mode: AC/ CMV (Assist Control/ Continuous Mandatory Ventilation)  RR (machine): 20  TV (machine): 500  FiO2: 40  PEEP: 5  ITime: 1  MAP: 11  PIP: 25      Weight in k.1 ( @ 00:00)    I&O's Summary    26 Sep 2021 07:01  -  27 Sep 2021 07:00  --------------------------------------------------------  IN: 4147 mL / OUT: 3615 mL / NET: 532 mL    27 Sep 2021 07:01  -  27 Sep 2021 13:33  --------------------------------------------------------  IN: 667.2 mL / OUT: 290 mL / NET: 377.2 mL        Physical Exam:   General: No distress. Comfortable.  HEENT: EOM intact.  Neck: Neck supple. JVP not elevated. No masses  Chest: Clear to auscultation bilaterally  CV: Normal S1 and S2. No murmurs, rub, or gallops. Radial pulses normal.  Abdomen: Soft, non-distended, non-tender  Skin: No rashes or skin breakdown  Neurology: Alert and oriented times three. Sensation intact  Psych: Affect normal    LVAD Interrogation  VAD TYPE  Speed  Flow  Power  PI   HVAD wave form description  Assessment of driveline exit site  Programming changes:     Labs:                        8.5    36.59 )-----------( 145      ( 27 Sep 2021 00:28 )             25.5         137  |  102  |  28<H>  ----------------------------<  221<H>  4.7   |  18<L>  |  0.52    Ca    10.2      27 Sep 2021 00:28  Phos  2.6       Mg     2.1         TPro  7.7  /  Alb  4.7  /  TBili  2.5<H>  /  DBili  x   /  AST  26  /  ALT  26  /  AlkPhos  147<H>      PTT - ( 25 Sep 2021 21:11 )  PTT:29.9 sec        Oxygen Saturation, Mixed: 65.8 ( @ 00:08)  Oxygen Saturation, Mixed: 64.8 ( @ 12:36)  Oxygen Saturation, Mixed: 62.7 ( @ 06:50)    Lactate Dehydrogenase, Serum: 253 U/L ( @ 00:28)  Lactate Dehydrogenase, Serum: 484 U/L ( @ 02:23)  Lactate Dehydrogenase, Serum: 407 U/L ( @ 00:28)          Imaging Studies  Subjective: Patient seen and examined resting in bed. Remains intubated and sedated    Medications:  acetaminophen    Suspension .. 650 milliGRAM(s) Enteral Tube every 6 hours PRN  chlorhexidine 0.12% Liquid 15 milliLiter(s) Oral Mucosa two times a day  chlorhexidine 2% Cloths 1 Application(s) Topical <User Schedule>  dextrose 50% Injectable 25 Gram(s) IV Push once  fluconAZOLE IVPB 100 milliGRAM(s) IV Intermittent every 24 hours  hydrocortisone sodium succinate Injectable 100 milliGRAM(s) IV Push every 8 hours  insulin lispro (ADMELOG) corrective regimen sliding scale   SubCutaneous every 6 hours  insulin regular Infusion 2 Unit(s)/Hr IV Continuous <Continuous>  lidocaine   4% Patch 1 Patch Transdermal daily  lidocaine   Infusion 0.5 mG/Min IV Continuous <Continuous>  methimazole 10 milliGRAM(s) Oral every 8 hours  metoclopramide Injectable 10 milliGRAM(s) IV Push every 8 hours  metoprolol tartrate Injectable 2.5 milliGRAM(s) IV Push every 6 hours  multivitamin 1 Tablet(s) Oral daily  norepinephrine Infusion 0.05 MICROgram(s)/kG/Min IV Continuous <Continuous>  ondansetron Injectable 4 milliGRAM(s) IV Push every 6 hours PRN  pantoprazole  Injectable 40 milliGRAM(s) IV Push every 12 hours  piperacillin/tazobactam IVPB.. 3.375 Gram(s) IV Intermittent every 8 hours  propofol Infusion 13.528 MICROgram(s)/kG/Min IV Continuous <Continuous>  simethicone 80 milliGRAM(s) Chew every 8 hours PRN  sodium chloride 0.9% lock flush 10 milliLiter(s) IV Push every 1 hour PRN  sodium chloride 0.9%. 1000 milliLiter(s) IV Continuous <Continuous>  thiamine 100 milliGRAM(s) Oral daily  vancomycin    Solution 125 milliGRAM(s) Oral every 6 hours  vancomycin  IVPB 1000 milliGRAM(s) IV Intermittent every 12 hours  vasopressin Infusion 0.1 Unit(s)/Min IV Continuous <Continuous>      ICU Vital Signs Last 24 Hrs  T(C): 36.5 (27 Sep 2021 12:00), Max: 38.1 (26 Sep 2021 16:00)  T(F): 97.7 (27 Sep 2021 12:00), Max: 100.6 (26 Sep 2021 16:00)  HR: 83 (27 Sep 2021 13:30) (77 - 108)  BP: --  BP(mean): --  ABP: 86/73 (27 Sep 2021 13:30) (76/65 - 115/80)  ABP(mean): 79 (27 Sep 2021 13:30) (70 - 98)  RR: 20 (27 Sep 2021 13:30) (20 - 29)  SpO2: 99% (27 Sep 2021 13:30) (96% - 100%)    Mode: AC/ CMV (Assist Control/ Continuous Mandatory Ventilation)  RR (machine): 20  TV (machine): 500  FiO2: 40  PEEP: 5  ITime: 1  MAP: 11  PIP: 25      Weight in k.1 ( @ 00:00)    I&O's Summary    26 Sep 2021 07:01  -  27 Sep 2021 07:00  --------------------------------------------------------  IN: 4147 mL / OUT: 3615 mL / NET: 532 mL    27 Sep 2021 07:01  -  27 Sep 2021 13:33  --------------------------------------------------------  IN: 667.2 mL / OUT: 290 mL / NET: 377.2 mL        Physical Exam:   General: intubated and sedated   Neck: Neck supple. JVP not elevated. No masses  Chest: intubated, +vent sounds   CV: +VD hum  Abdomen: Soft, non-distended, +keotube   Neurology: sedated    LVAD Interrogation  VAD TYPE HM 2  Speed 9200  Flow 5.2  Power 5.5  PI 6  Assessment of driveline exit site: driveline dressing c/d/i  Programming changes: no changes made    Labs:                        8.5    36.59 )-----------( 145      ( 27 Sep 2021 00:28 )             25.5     09-27    137  |  102  |  28<H>  ----------------------------<  221<H>  4.7   |  18<L>  |  0.52    Ca    10.2      27 Sep 2021 00:28  Phos  2.6       Mg     2.1         TPro  7.7  /  Alb  4.7  /  TBili  2.5<H>  /  DBili  x   /  AST  26  /  ALT  26  /  AlkPhos  147<H>      PTT - ( 25 Sep 2021 21:11 )  PTT:29.9 sec        Oxygen Saturation, Mixed: 65.8 ( @ 00:08)  Oxygen Saturation, Mixed: 64.8 ( @ 12:36)  Oxygen Saturation, Mixed: 62.7 ( @ 06:50)    Lactate Dehydrogenase, Serum: 253 U/L ( @ 00:28)  Lactate Dehydrogenase, Serum: 484 U/L ( @ 02:23)  Lactate Dehydrogenase, Serum: 407 U/L ( @ 00:28)

## 2021-09-27 NOTE — PROGRESS NOTE ADULT - SUBJECTIVE AND OBJECTIVE BOX
RICKY HAMPTON  MRN-[id number]  Patient is a 66y old  Male who presents with a chief complaint of Anemia, Supratherapeutic INR, Dark Stools (26 Sep 2021 17:53)    HPI:  65M PMH ACC/AHA stage D HF due to NICM HM2 LVAD , TV annuloplasty ring 17 as destination therapy due to severe peripheral artery disease with significant stenosis  SIADH, Depression, CKD-3 with hyperkalemia, past E. coli UTIs, driveline drainage (21) and COVID-19 (back in 2020)  He was recently seen in clinic where he complained of abdominal pain and dark stools w constipation back in May. He presents to [facility] ER today weakness and fatigue, moderate and + Black stools for three days, on coumadin secondary to warfarin use in the setting of an LVAD. Patient has required transfusions for GIB in the past. Mostly recently back in 2021 pt had anemia with dark stools. No interventions was done at that time. However Last Endoscopy was done in 2020 (negative). Today labs show patient is anemic with H/H of 4.5/16.3,. INR is 8.84 MAP in the 90s, Temp 35.1. He denies any chest pain, shortness of breath, dizziness, abd pain, nausea or vomiting.       (2021 16:57)      Surgery/Hospital Course:   admit for melena w/ anemia, INR 8.84   6/15 Capsul study (+) for small bowel bleed, balloon endoscopy (old blood in prox ileum); post EGD - septic w/ L opacity, re-intubated for concern for aspiration, TTE (Mod MR, decrease biV w/ interventricular septum boweing towards R)   bronch    +C Diff    CT C/A/P: Fluid filled colon which may be 2/2 rapid transit. Small bilateral pleffs with associates. Compressive atelectasis New ISABELLE & LLL  parenchymal opacities, suspicious for pneumonia. 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    #8 Shiley trach at bedside    LVAD speed increased to 9200   Bronch   TC since . Patient transferred to SDU.    INR today 2.64.  H&H 7.3/24 this AM.  Will repeat CBC at noon, and will send stool guaiac Patient with persistent abdominal tenderness, rate of tube feeds decreased.  No nausea/vomiting.     INR today 2.4. H&H 9.1/28.6 low flow overnight /N&V, refusing Tube feeds on D5 1/2 normal  @50 cc/hr   INR 2.69  H&H 7.7/.1 refusing Tube feeds on D5 1/2 normal  @50 cc/hr. This am + BM Melena Dr Oneill HF  aware- PRBC x1  GI team consulted -  NPO  plan on study in am-  D/w Dr Cadet Patient  to return to CTU for further management; 1PRBCS    Post op INR 2.2 today.  No bleeding. BC + for SM.  Pt is hypotensive requiring pressor and inotropic support.  ID follow up today on Cefepime will follow.   R PTC for PTX    CT C/A/P: sub q emphysema in R chest wall, GGO RUL, small ascites CTH negative; Abd US: GB thickening, pericholecystic fluid     Perchole drain in place continues to drain total output overnight 133.  Fever today 38.8    duplex LE negative    Patient with persistent abdominal pain, refusing tube feeds and medications, Psych consulted   CTA A/P ordered to r/o mesenteric ischemia 2/2 persistent anorexia, nausea, vomiting. Revealed:  Evaluation of the mesenteric vessels is limited by streak artifact from LVAD. There appears to be severe stenosis of the proximal SMA; abdominal mesenteric doppler is recommended for further evaluation. 2.  No small bowel findings to suggest acute mesenteric ischemia. 3.  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  8/15: Cultures resulted BC 1/2 +GPC in clusters, SC enterobacter; mesenteric duplex: borderline stenosis of proximal SMA  : CT C/A/P noncon: Nondular opacities in R lung apex w/cavitation, abd nl  :  Continue current care, treatment of thyrotoxicosis with medications as per endocrine, d/c ABX as per team.    RUQ sono: Contracted gallbladder with cholecystostomy tube in place.  9/10 failed SMA stent, L fem PSA, s/p 3U PRCB   CTA Abd/Pelvis L RP hematoma    Trach decannulated    intubated fro resp distress for increased WOB, LL pneumonia; CT C/A/P: progressive ISABELLE opacities and L consolidations, multifocal pneumonia     Today:      REVIEW OF SYSTEMS:  Unable to obtain, pt intubated     ICU Vital Signs Last 24 Hrs  T(C): 36.9 (27 Sep 2021 08:00), Max: 38.1 (26 Sep 2021 16:00)  T(F): 98.4 (27 Sep 2021 08:00), Max: 100.6 (26 Sep 2021 16:00)  HR: 95 (27 Sep 2021 08:00) (88 - 108)  BP: --  BP(mean): --  ABP: 76/67 (27 Sep 2021 08:00) (71/64 - 115/80)  ABP(mean): 71 (27 Sep 2021 08:00) (67 - 96)  RR: 20 (27 Sep 2021 08:00) (20 - 29)  SpO2: 98% (27 Sep 2021 08:00) (96% - 100%)      Physical Exam:  Gen:  intubated   CNS:  sedated   Neck: no JVD, gauze over trach wound  RES : coarse breath sounds, no wheezing    CVS: +LVAD hum   Abd: Soft. Sybil drain with bilious fluid. Positive BS throughout. Mild RUQ abdominal tenderness by perc sybil.  Skin: No rash, erythema, cyanosis.  Vasc: Warm and well-perfused.  Ext:  no edema    ============================I/O===========================   I&O's Detail    26 Sep 2021 07:01  -  27 Sep 2021 07:00  --------------------------------------------------------  IN:    Albumin 5%  - 250 mL: 1500 mL    Enteral Tube Flush: 120 mL    Insulin: 9 mL    IV PiggyBack: 600 mL    IV PiggyBack: 250 mL    Lidocaine: 91.2 mL    Norepinephrine: 246 mL    Phenylephrine: 105 mL    PRBCs (Packed Red Blood Cells): 300 mL    PRBCs (Packed Red Blood Cells): 300 mL    Propofol: 271.8 mL    sodium chloride 0.9%: 240 mL    Vasopressin: 114 mL  Total IN: 4147 mL    OUT:    Drain (mL): 140 mL    Miscellaneous Tube Feedin mL    Voided (mL): 3475 mL  Total OUT: 3615 mL    Total NET: 532 mL      27 Sep 2021 07:01  -  27 Sep 2021 08:21  --------------------------------------------------------  IN:    Insulin: 3 mL    Lidocaine: 3.8 mL    Propofol: 12.9 mL    sodium chloride 0.9%: 10 mL    Vasopressin: 6 mL  Total IN: 35.7 mL    OUT:    Miscellaneous Tube Feedin mL    Norepinephrine: 0 mL    Voided (mL): 100 mL  Total OUT: 100 mL    Total NET: -64.3 mL        ============================ LABS =========================                        8.5    36.59 )-----------( 145      ( 27 Sep 2021 00:28 )             25.5     -    137  |  102  |  28<H>  ----------------------------<  221<H>  4.7   |  18<L>  |  0.52    Ca    10.2      27 Sep 2021 00:28  Phos  2.6       Mg     2.1         TPro  7.7  /  Alb  4.7  /  TBili  2.5<H>  /  DBili  x   /  AST  26  /  ALT  26  /  AlkPhos  147<H>      LIVER FUNCTIONS - ( 27 Sep 2021 00:28 )  Alb: 4.7 g/dL / Pro: 7.7 g/dL / ALK PHOS: 147 U/L / ALT: 26 U/L / AST: 26 U/L / GGT: x           PTT - ( 25 Sep 2021 21:11 )  PTT:29.9 sec  ABG - ( 27 Sep 2021 00:08 )  pH, Arterial: 7.36  pH, Blood: x     /  pCO2: 38    /  pO2: 145   / HCO3: 22    / Base Excess: -3.6  /  SaO2: 99.8              Urinalysis Basic - ( 26 Sep 2021 03:33 )    Color: Light Yellow / Appearance: Slightly Turbid / S.007 / pH: x  Gluc: x / Ketone: Negative  / Bili: Negative / Urobili: Negative   Blood: x / Protein: Trace / Nitrite: Negative   Leuk Esterase: Moderate / RBC: 110 /hpf / WBC 26 /HPF   Sq Epi: x / Non Sq Epi: 7 /hpf / Bacteria: Negative      ======================Micro/Rad/Cardio=================  Culture: Reviewed   CXR: Reviewed  Echo:Reviewed  ======================================================  PAST MEDICAL & SURGICAL HISTORY:  CHF (congestive heart failure)    CAD (coronary artery disease)    Depression    Pleural effusion    History of 2019 novel coronavirus disease (COVID-19)  2020    Hemorrhoids    Bleeding hemorrhoids    Peripheral arterial disease    Claudication    BPH with urinary obstruction    ACC/AHA stage D systolic heart failure    Anticoagulation goal of INR 2.0 to 2.5    Falls    Clavicle fracture    CKD (chronic kidney disease), stage III    Iron deficiency anemia    H/O epistaxis    Vertigo    GI bleed    S/P TVR (tricuspid valve repair)    S/P ventricular assist device    S/P endoscopy      ====================ASSESSMENT ==============  Stage D Nonischemic Cardiomyopathy, Status Post HM2 on 2017    Cardiogenic shock  Hemodynamic instability   Acute hypoxemic respiratory failure s/p trach , decannulated on ; Reintubated on    GI bleed , Status Post Enteroscopy   Anemia, in setting of melena   Chronic Kidney Disease  Stress hyperglycemia   C.diff positive on    Hypovolemic shock  Septic shock  Leukocytosis  GB thickening/percholecystic s/p perc choley by IT   SMA stenosis  Serratia/citrobacter pneumonia   Stenotrophomonas pneumonia   Enterobacter pneumonia   Nasuea/vomiting  Deconditioning  Hyponatremia    Plan:  ====================== NEUROLOGY=====================  Sedated with IV Propofol to maintain vent synchrony   Tylenol PRN for analgesia   Deconditioned, PT/Ambulate as tolerated    acetaminophen    Suspension .. 650 milliGRAM(s) Enteral Tube every 6 hours PRN Mild Pain (1 - 3)  propofol Infusion 13.528 MICROgram(s)/kG/Min (5 mL/Hr) IV Continuous <Continuous>    ==================== RESPIRATORY======================  Acute hypoxemic respiratory failure s/p #8 shiley trach on ; decannulated on ; Reintubated on    Continue close monitoring of respiratory rate and breathing pattern, following of ABG’s with A-line monitoring, continuous pulse oximetry monitoring.     Mechanical Ventilation:  Mode: AC/ CMV (Assist Control/ Continuous Mandatory Ventilation)  RR (machine): 20  TV (machine): 500  FiO2: 50  PEEP: 5  ITime: 1  MAP: 11  PIP: 26      ====================CARDIOVASCULAR==================  Stage D Nonischemic Cardiomyopathy, Status Post HM2 on 2017; LVAD settings 9200, flow 5.2  TTE : reveals at least moderate MR, mild AI, severe global LV syst dysfxn, dec RV fxn   Propranolol for rate control of HR 2/2 Hyperthyroidism, titrate as tolerated per Endo  Continue invasive hemodynamic monitoring   Recent VT, continue with IV Lidocaine   On pressor support with IV Levophed and IV Vasopressin     lidocaine   Infusion 0.5 mG/Min (3.75 mL/Hr) IV Continuous <Continuous>  norepinephrine Infusion 0.05 MICROgram(s)/kG/Min (2.89 mL/Hr) IV Continuous <Continuous>  vasopressin Infusion 0.1 Unit(s)/Min (6 mL/Hr) IV Continuous <Continuous>    ===================HEMATOLOGIC/ONC ===================  CTA A/P on  +L RP hematoma   Acute blood loss anemia, monitor H&H/Plts    Holding coumadin and ASA given recurrent GI bleeding, continue monitoring LDH     ===================== RENAL =========================  Continue monitoring urine output, I&OS, BUN/Cr   Euvolemic, even fluid balance, currently off diuretics.    Replete lytes PRN. Keep K> 4 and Mg >2     ==================== GASTROINTESTINAL===================  S/p cholecystostomy tube placed on  with IR  Tube feeds held, currently NPO   CTA A/P : Evaluation of the mesenteric vessels is limited by streak artifact from LVAD. There appears to be severe stenosis of the proximal SMA; abdominal mesenteric doppler is recommended for further evaluation. 2.  No small bowel findings to suggest acute mesenteric ischemia. 3.  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  Mesenteric duplex on 8/15 borderline stenosis of proximal SMA, s/p failed SMA stent, L fem RP hematoma on 9/10; No intervention for cholecystostomy tube, PEJ   Reglan for gut motility  Zofran PRN nausea/vomiting    multivitamin 1 Tablet(s) Oral daily  GI prophylaxis, pantoprazole  Injectable 40 milliGRAM(s) IV Push every 12 hours  simethicone 80 milliGRAM(s) Chew every 8 hours PRN Gas  sodium chloride 0.9% lock flush 10 milliLiter(s) IV Push every 1 hour PRN Pre/post blood products, medications, blood draw, and to maintain line patency  sodium chloride 0.9%. 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 - Free T4 remains elevated   Per endocrine      - Thyroid US when trach is out      - Will discuss with Endocrine re:Propanolol for        - c/w Methimazole and hydrocortisone    dextrose 50% Injectable 25 Gram(s) IV Push once  hydrocortisone sodium succinate Injectable 100 milliGRAM(s) IV Push every 8 hours  insulin lispro (ADMELOG) corrective regimen sliding scale   SubCutaneous every 6 hours  insulin regular Infusion 2 Unit(s)/Hr (2 mL/Hr) IV Continuous <Continuous>  methimazole 10 milliGRAM(s) Oral every 8 hours    ========================INFECTIOUS DISEASE================  Afebrile, WBC downtrending 43/19->36.59  Continue trending WBC and monitoring fever curve   CT C/A/P : progressive ISABELLE opacities and L consolidations, multifocal pneumonia  Concern for aspiration pneumonia as per ID, C/w IVPB Vanco, Zosyn, and Flagyl for empiric coverage   Vanco solution for c.diff prophylaxis     fluconAZOLE IVPB 100 milliGRAM(s) IV Intermittent every 24 hours  piperacillin/tazobactam IVPB.. 3.375 Gram(s) IV Intermittent every 8 hours  vancomycin    Solution 125 milliGRAM(s) Oral every 6 hours  vancomycin  IVPB 1000 milliGRAM(s) IV Intermittent every 12 hours      Patient requires continuous monitoring with bedside rhythm monitoring, pulse ox monitoring, and intermittent blood gas analysis. Care plan discussed with ICU care team. Patient remained critical and at risk for life threatening decompensation.     By signing my name below, I, Agnieszka Cherry, attest that this documentation has been prepared under the direction and in the presence of CLAUDIA Johnson   Electronically signed: Cesia Shaffer, 21 @ 08:21    I, CLAUDIA Johnson, personally performed the services described in this documentation. all medical record entries made by the luisibmel were at my direction and in my presence. I have reviewed the chart and agree that the record reflects my personal performance and is accurate and complete  Electronically signed: CLAUDIA Johnson        RICKY HAMPTON  MRN-[id number]  Patient is a 66y old  Male who presents with a chief complaint of Anemia, Supratherapeutic INR, Dark Stools (26 Sep 2021 17:53)    HPI:  65M PMH ACC/AHA stage D HF due to NICM HM2 LVAD , TV annuloplasty ring 17 as destination therapy due to severe peripheral artery disease with significant stenosis  SIADH, Depression, CKD-3 with hyperkalemia, past E. coli UTIs, driveline drainage (21) and COVID-19 (back in 2020)  He was recently seen in clinic where he complained of abdominal pain and dark stools w constipation back in May. He presents to [facility] ER today weakness and fatigue, moderate and + Black stools for three days, on coumadin secondary to warfarin use in the setting of an LVAD. Patient has required transfusions for GIB in the past. Mostly recently back in 2021 pt had anemia with dark stools. No interventions was done at that time. However Last Endoscopy was done in 2020 (negative). Today labs show patient is anemic with H/H of 4.5/16.3,. INR is 8.84 MAP in the 90s, Temp 35.1. He denies any chest pain, shortness of breath, dizziness, abd pain, nausea or vomiting.       (2021 16:57)      Surgery/Hospital Course:   admit for melena w/ anemia, INR 8.84   6/15 Capsul study (+) for small bowel bleed, balloon endoscopy (old blood in prox ileum); post EGD - septic w/ L opacity, re-intubated for concern for aspiration, TTE (Mod MR, decrease biV w/ interventricular septum boweing towards R)   bronch    +C Diff    CT C/A/P: Fluid filled colon which may be 2/2 rapid transit. Small bilateral pleffs with associates. Compressive atelectasis New ISABELLE & LLL  parenchymal opacities, suspicious for pneumonia. 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    #8 Shiley trach at bedside    LVAD speed increased to 9200   Bronch   TC since . Patient transferred to SDU.    INR today 2.64.  H&H 7.3/24 this AM.  Will repeat CBC at noon, and will send stool guaiac Patient with persistent abdominal tenderness, rate of tube feeds decreased.  No nausea/vomiting.     INR today 2.4. H&H 9.1/28.6 low flow overnight /N&V, refusing Tube feeds on D5 1/2 normal  @50 cc/hr   INR 2.69  H&H 7.7/.1 refusing Tube feeds on D5 1/2 normal  @50 cc/hr. This am + BM Melena Dr Oneill HF  aware- PRBC x1  GI team consulted -  NPO  plan on study in am-  D/w Dr Cadet Patient  to return to CTU for further management; 1PRBCS    Post op INR 2.2 today.  No bleeding. BC + for SM.  Pt is hypotensive requiring pressor and inotropic support.  ID follow up today on Cefepime will follow.   R PTC for PTX    CT C/A/P: sub q emphysema in R chest wall, GGO RUL, small ascites CTH negative; Abd US: GB thickening, pericholecystic fluid     Perchole drain in place continues to drain total output overnight 133.  Fever today 38.8    duplex LE negative    Patient with persistent abdominal pain, refusing tube feeds and medications, Psych consulted   CTA A/P ordered to r/o mesenteric ischemia 2/2 persistent anorexia, nausea, vomiting. Revealed:  Evaluation of the mesenteric vessels is limited by streak artifact from LVAD. There appears to be severe stenosis of the proximal SMA; abdominal mesenteric doppler is recommended for further evaluation. 2.  No small bowel findings to suggest acute mesenteric ischemia. 3.  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  8/15: Cultures resulted BC 1/2 +GPC in clusters, SC enterobacter; mesenteric duplex: borderline stenosis of proximal SMA  : CT C/A/P noncon: Nondular opacities in R lung apex w/cavitation, abd nl  :  Continue current care, treatment of thyrotoxicosis with medications as per endocrine, d/c ABX as per team.    RUQ sono: Contracted gallbladder with cholecystostomy tube in place.  9/10 failed SMA stent, L fem PSA, s/p 3U PRCB   CTA Abd/Pelvis L RP hematoma    Trach decannulated    intubated fro resp distress for increased WOB, LL pneumonia; CT C/A/P: progressive ISABELLE opacities and L consolidations, multifocal pneumonia     Today:  Pressor support with IV Levophed weaned to off overnight, c/w IV Vasopressin. Plan to remove PAC, maintain A line and RIJ TL. 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 Plan to discuss goals of care with palliative. Will also re-consult psych to clarify goals of care for patient. Will start planning for re-trach, final decision will be made pending palliative recommendations.     REVIEW OF SYSTEMS:  Unable to obtain, pt intubated     ICU Vital Signs Last 24 Hrs  T(C): 36.9 (27 Sep 2021 08:00), Max: 38.1 (26 Sep 2021 16:00)  T(F): 98.4 (27 Sep 2021 08:00), Max: 100.6 (26 Sep 2021 16:00)  HR: 95 (27 Sep 2021 08:00) (88 - 108)  BP: --  BP(mean): --  ABP: 76/67 (27 Sep 2021 08:00) (71/64 - 115/80)  ABP(mean): 71 (27 Sep 2021 08:00) (67 - 96)  RR: 20 (27 Sep 2021 08:00) (20 - 29)  SpO2: 98% (27 Sep 2021 08:00) (96% - 100%)      Physical Exam:  Gen:  intubated   CNS:  sedated   Neck: no JVD, gauze over trach wound  RES : coarse breath sounds, no wheezing    CVS: +LVAD hum   Abd: Soft. Sybil drain with bilious fluid. Positive BS throughout. Mild RUQ abdominal tenderness by perc sybil.  Skin: No rash, erythema, cyanosis.  Vasc: Warm and well-perfused.  Ext:  no edema    ============================I/O===========================   I&O's Detail    26 Sep 2021 07:01  -  27 Sep 2021 07:00  --------------------------------------------------------  IN:    Albumin 5%  - 250 mL: 1500 mL    Enteral Tube Flush: 120 mL    Insulin: 9 mL    IV PiggyBack: 600 mL    IV PiggyBack: 250 mL    Lidocaine: 91.2 mL    Norepinephrine: 246 mL    Phenylephrine: 105 mL    PRBCs (Packed Red Blood Cells): 300 mL    PRBCs (Packed Red Blood Cells): 300 mL    Propofol: 271.8 mL    sodium chloride 0.9%: 240 mL    Vasopressin: 114 mL  Total IN: 4147 mL    OUT:    Drain (mL): 140 mL    Miscellaneous Tube Feedin mL    Voided (mL): 3475 mL  Total OUT: 3615 mL    Total NET: 532 mL      27 Sep 2021 07:01  -  27 Sep 2021 08:21  --------------------------------------------------------  IN:    Insulin: 3 mL    Lidocaine: 3.8 mL    Propofol: 12.9 mL    sodium chloride 0.9%: 10 mL    Vasopressin: 6 mL  Total IN: 35.7 mL    OUT:    Miscellaneous Tube Feedin mL    Norepinephrine: 0 mL    Voided (mL): 100 mL  Total OUT: 100 mL    Total NET: -64.3 mL        ============================ LABS =========================                        8.5    36.59 )-----------( 145      ( 27 Sep 2021 00:28 )             25.5         137  |  102  |  28<H>  ----------------------------<  221<H>  4.7   |  18<L>  |  0.52    Ca    10.2      27 Sep 2021 00:28  Phos  2.6       Mg     2.1         TPro  7.7  /  Alb  4.7  /  TBili  2.5<H>  /  DBili  x   /  AST  26  /  ALT  26  /  AlkPhos  147<H>      LIVER FUNCTIONS - ( 27 Sep 2021 00:28 )  Alb: 4.7 g/dL / Pro: 7.7 g/dL / ALK PHOS: 147 U/L / ALT: 26 U/L / AST: 26 U/L / GGT: x           PTT - ( 25 Sep 2021 21:11 )  PTT:29.9 sec  ABG - ( 27 Sep 2021 00:08 )  pH, Arterial: 7.36  pH, Blood: x     /  pCO2: 38    /  pO2: 145   / HCO3: 22    / Base Excess: -3.6  /  SaO2: 99.8              Urinalysis Basic - ( 26 Sep 2021 03:33 )    Color: Light Yellow / Appearance: Slightly Turbid / S.007 / pH: x  Gluc: x / Ketone: Negative  / Bili: Negative / Urobili: Negative   Blood: x / Protein: Trace / Nitrite: Negative   Leuk Esterase: Moderate / RBC: 110 /hpf / WBC 26 /HPF   Sq Epi: x / Non Sq Epi: 7 /hpf / Bacteria: Negative      ======================Micro/Rad/Cardio=================  Culture: Reviewed   CXR: Reviewed  Echo:Reviewed  ======================================================  PAST MEDICAL & SURGICAL HISTORY:  CHF (congestive heart failure)    CAD (coronary artery disease)    Depression    Pleural effusion    History of 2019 novel coronavirus disease (COVID-19)  2020    Hemorrhoids    Bleeding hemorrhoids    Peripheral arterial disease    Claudication    BPH with urinary obstruction    ACC/AHA stage D systolic heart failure    Anticoagulation goal of INR 2.0 to 2.5    Falls    Clavicle fracture    CKD (chronic kidney disease), stage III    Iron deficiency anemia    H/O epistaxis    Vertigo    GI bleed    S/P TVR (tricuspid valve repair)    S/P ventricular assist device    S/P endoscopy      ====================ASSESSMENT ==============  Stage D Nonischemic Cardiomyopathy, Status Post HM2 on 2017    Cardiogenic shock  Hemodynamic instability   Acute hypoxemic respiratory failure s/p trach , decannulated on ; Reintubated on    GI bleed , Status Post Enteroscopy   Anemia, in setting of melena   Chronic Kidney Disease  Stress hyperglycemia   C.diff positive on    Hypovolemic shock  Septic shock  Leukocytosis  GB thickening/percholecystic s/p perc choley by IT   SMA stenosis  Serratia/citrobacter pneumonia   Stenotrophomonas pneumonia   Enterobacter pneumonia   Nasuea/vomiting  Deconditioning  Hyponatremia    Plan:  ====================== NEUROLOGY=====================  Sedated with IV Propofol to maintain vent synchrony   Tylenol PRN for analgesia   Reconsult psych, will f/u recommendations   Deconditioned, PT/Ambulate as tolerated    acetaminophen    Suspension .. 650 milliGRAM(s) Enteral Tube every 6 hours PRN Mild Pain (1 - 3)  propofol Infusion 13.528 MICROgram(s)/kG/Min (5 mL/Hr) IV Continuous <Continuous>    ==================== RESPIRATORY======================  Acute hypoxemic respiratory failure s/p #8 shiley trach on ; decannulated on ; Reintubated on    Continue close monitoring of respiratory rate and breathing pattern, following of ABG’s with A-line monitoring, continuous pulse oximetry monitoring.   Will start planning for re-trach, final decision will be made pending palliative recommendations.     Mechanical Ventilation:  Mode: AC/ CMV (Assist Control/ Continuous Mandatory Ventilation)  RR (machine): 20  TV (machine): 500  FiO2: 50  PEEP: 5  ITime: 1  MAP: 11  PIP: 26      ====================CARDIOVASCULAR==================  Stage D Nonischemic Cardiomyopathy, Status Post HM2 on 2017; LVAD settings 9200, flow 5.2  TTE : reveals at least moderate MR, mild AI, severe global LV syst dysfxn, dec RV fxn   Propranolol for rate control of HR 2/2 Hyperthyroidism, titrate as tolerated per Endo  Continue invasive hemodynamic monitoring - Plan to remove PAC, maintain A line and RIJ TL.   Recent VT, continue with IV Lidocaine   Pressor support with IV Levophed weaned to off overnight, c/w IV Vasopressin    lidocaine   Infusion 0.5 mG/Min (3.75 mL/Hr) IV Continuous <Continuous>  norepinephrine Infusion 0.05 MICROgram(s)/kG/Min (2.89 mL/Hr) IV Continuous <Continuous>  vasopressin Infusion 0.1 Unit(s)/Min (6 mL/Hr) IV Continuous <Continuous>    ===================HEMATOLOGIC/ONC ===================  CTA A/P on  +L RP hematoma   Acute blood loss anemia, monitor H&H/Plts    Holding coumadin and ASA given recurrent GI bleeding, continue monitoring LDH     ===================== RENAL =========================  Continue monitoring urine output, I&OS, BUN/Cr   Euvolemic, even fluid balance, currently off diuretics.    Replete lytes PRN. Keep K> 4 and Mg >2     ==================== GASTROINTESTINAL===================  S/p cholecystostomy tube placed on  with IR  Tube feeds held, currently NPO   CTA A/P : Evaluation of the mesenteric vessels is limited by streak artifact from LVAD. There appears to be severe stenosis of the proximal SMA; abdominal mesenteric doppler is recommended for further evaluation. 2.  No small bowel findings to suggest acute mesenteric ischemia. 3.  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  Mesenteric duplex on 8/15 borderline stenosis of proximal SMA, s/p failed SMA stent, L fem RP hematoma on 9/10; No intervention for cholecystostomy tube, PEJ   Reglan for gut motility  Zofran PRN nausea/vomiting    multivitamin 1 Tablet(s) Oral daily  GI prophylaxis, pantoprazole  Injectable 40 milliGRAM(s) IV Push every 12 hours  simethicone 80 milliGRAM(s) Chew every 8 hours PRN Gas  sodium chloride 0.9% lock flush 10 milliLiter(s) IV Push every 1 hour PRN Pre/post blood products, medications, blood draw, and to maintain line patency  sodium chloride 0.9%. 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 - Free T4 remains elevated   Per endocrine      - Thyroid US when trach is out      - Will discuss with Endocrine re: Propanolol       - c/w Methimazole and hydrocortisone    dextrose 50% Injectable 25 Gram(s) IV Push once  hydrocortisone sodium succinate Injectable 100 milliGRAM(s) IV Push every 8 hours  insulin lispro (ADMELOG) corrective regimen sliding scale   SubCutaneous every 6 hours  insulin regular Infusion 2 Unit(s)/Hr (2 mL/Hr) IV Continuous <Continuous>  methimazole 10 milliGRAM(s) Oral every 8 hours    ========================INFECTIOUS DISEASE================  Afebrile, WBC downtrending 43/19->36.59  Continue trending WBC and monitoring fever curve   CT C/A/P : progressive ISABELLE opacities and L consolidations, multifocal pneumonia  Concern for aspiration pneumonia as per ID, C/w IVPB Vanco, Zosyn, and Flagyl for empiric coverage   Vanco solution for c.diff prophylaxis     fluconAZOLE IVPB 100 milliGRAM(s) IV Intermittent every 24 hours  piperacillin/tazobactam IVPB.. 3.375 Gram(s) IV Intermittent every 8 hours  vancomycin    Solution 125 milliGRAM(s) Oral every 6 hours  vancomycin  IVPB 1000 milliGRAM(s) IV Intermittent every 12 hours    ========================GOALS OF CARE================   Plan to discuss goals of care with palliative.       Patient requires continuous monitoring with bedside rhythm monitoring, pulse ox monitoring, and intermittent blood gas analysis. Care plan discussed with ICU care team. Patient remained critical and at risk for life threatening decompensation.     By signing my name below, I, Agnieszka Cherry, attest that this documentation has been prepared under the direction and in the presence of CLAUDIA Johnson   Electronically signed: Cesia Shaffer, 21 @ 08:21    I, CLAUDIA Johnson, personally performed the services described in this documentation. all medical record entries made by the scribe were at my direction and in my presence. I have reviewed the chart and agree that the record reflects my personal performance and is accurate and complete  Electronically signed: CLAUDIA Johnson

## 2021-09-27 NOTE — PROGRESS NOTE ADULT - SUBJECTIVE AND OBJECTIVE BOX
RICKY JOINT  MRN#: [id number]  Subjective:  Pulmonary progress  : recurrent Acute hypoxic respiratory Failure ,aspiration pneumonia, NICM  ,   65M PMH ACC/AHA stage D HF due to NICM HM2 LVAD , TV annuloplasty ring 17 as destination therapy due to severe peripheral artery disease with significant stenosis  SIADH, Depression, CKD-3 with hyperkalemia, past E. coli UTIs, driveline drainage (21) and COVID-19 (back in 2020)  He was recently seen in clinic where he complained of abdominal pain and dark stools w constipation back in May. He presents to [facility] ER today weakness and fatigue, moderate and + Black stools for three days, on coumadin secondary to warfarin use in the setting of an LVAD. Patient has required transfusions for GIB in the past. Mostly recently back in 2021 pt had anemia with dark stools. No interventions was done at that time. However Last Endoscopy was done in 2020 (negative). Today labs show patient is anemic with H/H of 4.5/16.3,. INR is 8.84 MAP in the 90s, Temp 35.1. He denies any chest pain, shortness of breath, dizziness, abd pain, nausea or vomiting. found to have  rectal bleeding underwent endoscopy ,old blood in the proximal ileum ,  develop sepsis with LL opacity given Antibiotics , Extubated , reintubated , Bronchoscopy on Zosyn for LL pneumonia  and Amiodarone S/P TV Annuloplasty , patient remain intubated on full ventilatory support .S/P multiple units of blood transfusion , remain on full ventilatory support on Precedex and propofol , new central IJ line , diarrhea C diff. +ve on po vancomycin and IV Flagyl,  mildly distended belly , fever start on cefepime 2gm q 8 hrs S/P tracheostomy .  new RT Subclavian central line continue on contact  isolation ,S/P  C-diff antibiotics, no more diarrhea back on full support mechanical ventilator , chest x ray show improvement in LLL air space disease, more awake and responsive on tube feeding no more diarrhea ,  no nausea or vomiting or diarrhea still very weak and tired , back on tube feeding ,still on po vancomycin , getting PT and OT at bed side ,   , no SOB getting stronger , improve muscle tone patient transfer to monitor bed still on contact isolation for C-Difficel colitis on 50% FI02, and change to Suresh  tube feeding still loose stool . H/H drop significantly require blood transfusion , most likely GI bleeding , IV heparin D/C ,  H/H is stable ., patient develop TR sided  pneumothorax require chest tube placement , RT IJ central line  placed , develop fever shaking chills , blood culture positive for serratia marcescens , start on cefepime .the patient  become hypoxic and hypotensive placed on full ventilatory support and Vasopressin , levophed and Dobutamine ,S/P blood transfusion on meropenem and vancomycin ,   , on and  off pressors , occasional agitation on Precedex .S/P IR cholecystostomy tube drainage placement in the RT upper Quadrant , resume anticoagulation chest x ray noted C-PAP trail lasted only for 2 hrs , new RT SC line and D/C RT IJ line , RT pig tail cathter has been removed , tolerating C-PAP trial placed on trach. collar 50% FI02 GI consultant noted on NG tube feeding as tolerated , develop AF RVR S/P  bolus Amiodarone  back to regular sinus Rhythm , Flat Affect depressed , back on tube feeding Vital AF at 60 cc/ hr .still intermittent abdominal pain , no fever saturation is accepted  back on full ventilatory support ,   on methimazole for hyperthyroidism ,  condition is the same ,still C/O Abdominal pain , white count is improving , no chest pain or SOB ,  .placed on Reglan 10 mg TID for gastric motility , depressed , withdrawn. , S/P  mesenteric angiogram , unable to stent SMA S/P 3 units of blood transfusion   RT IJ in place reassessed for stent in the SMA by vascular,  dark stool stable H/H ,surgical opinion for possible Lap cholecystectomy. over night events noted develop respiratory distress, , rectal bleeding, require intubation placed on full ventilatory support , FI02 is 50% also became hemianosmically unstable require triple pressor support with levophed , vasopressin and norsynephrine, pan culture placed  on Vancomycin IV and PO  and Zosyn, sedated with propofol kept NPO , new central line RT and left IJ cathter placed . Diflucan Added .fever of 38.5 weaned off levophed and norsynephrine , sedated on propofol      (2021 16:57)    PAST MEDICAL & SURGICAL HISTORY:  CHF (congestive heart failure)    CAD (coronary artery disease)  Depression    Pleural effusion    History of  novel coronavirus disease (COVID-19)  2020    Hemorrhoids    Bleeding hemorrhoids    Peripheral arterial disease    Claudication    BPH with urinary obstruction    ACC/AHA stage D systolic heart failure  Anticoagulation goal of INR 2.0 to 2.5    Falls    Clavicle fracture    CKD (chronic kidney disease), stage III    Iron deficiency anemia    H/O epistaxis    Vertigo    GI bleed    S/P TVR (tricuspid valve repair)    S/P ventricular assist device    S/P endoscopy    OBJECTIVE:  ICU Vital Signs Last 24 Hrs  T(C): 38.2 (2021 10:00), Max: 38.5 (2021 12:00)  T(F): 100.8 (2021 10:00), Max: 101.3 (2021 12:00)  HR: 65 (2021 10:00) (61 - 69)  BP: --  BP(mean): --  ABP: 105/67 (2021 10:00) (90/54 - 113/64)  ABP(mean): 77 (2021 10:00) (63 - 77)  RR: 20 (2021 10:00) (19 - 35)  SpO2: 99% (2021 10:00) (96% - 100%)       @ 07: @ 07:00  --------------------------------------------------------  IN: 2693.9 mL / OUT: 1415 mL / NET: 1278.9 mL     @ 07: @ 10:49  --------------------------------------------------------  IN: 420.8 mL / OUT: 115 mL / NET: 305.8 mL  PHYSICAL EXAM: Daily   Elderly male intubated on full ventilatory support FI02 is 40% , on vasopressin  support   Daily Weight in k.4 (2021 00:00)  HEENT:     + NCAT  + EOMI  - Conjuctival edema   - Icterus   - Thrush   - ETT  + NGT/OGT  Neck:         + FROM  RT IJ , LT IJ  lines JVD  - Nodes - Masses + Mid-line trachea - Tracheostomy  Chest:            normal A-P diameter    Lungs:          + CTA   + Rhonchi    - Rales    - Wheezing + Decreased  LT BS   - Dullness R L  Cardiac:       + S1 + S2    + RRR   - Irregular   - S3  - S4    - Murmurs   - Rub   - Hamman’s sign   Abdomen:    + BS  + Soft + Non-tender - Distended - Organomegaly - PEG .cholecystostomy tube in place  Extremities:   - Cyanosis U/L   - Clubbing  U/L  + LE/UE Edema   + Capillary refill    + Pulses   Neuro:        - Awake   -  Alert   - Confused   - Lethargic   + Sedated  + Generalized Weakness  Skin:        - Rashes    - Erythema   + Normal incisions   + IV sites intact          HOSPITAL MEDICATIONS: All medications reviewed and analyzed  MEDICATIONS  (STANDING):  amiodarone    Tablet 200 milliGRAM(s) Oral daily  chlorhexidine 0.12% Liquid 15 milliLiter(s) Oral Mucosa every 12 hours  chlorhexidine 2% Cloths 1 Application(s) Topical <User Schedule>  dexmedetomidine Infusion 0.5 MICROgram(s)/kG/Hr (9.81 mL/Hr) IV Continuous <Continuous>  dextrose 50% Injectable 50 milliLiter(s) IV Push every 15 minutes  heparin  Infusion 400 Unit(s)/Hr (12.5 mL/Hr) IV Continuous <Continuous>  Hydromorphone  Injectable 0.5 milliGRAM(s) IV Push once  insulin lispro (ADMELOG) corrective regimen sliding scale   SubCutaneous every 6 hours  pantoprazole  Injectable 40 milliGRAM(s) IV Push every 12 hours  piperacillin/tazobactam IVPB.. 3.375 Gram(s) IV Intermittent every 8 hours  propofol Infusion 20 MICROgram(s)/kG/Min (9.42 mL/Hr) IV Continuous <Continuous>  sodium chloride 0.9% lock flush 3 milliLiter(s) IV Push every 8 hours  sodium chloride 0.9%. 1000 milliLiter(s) (10 mL/Hr) IV Continuous <Continuous>    MEDICATIONS  (PRN):  acetaminophen    Suspension .. 650 milliGRAM(s) Oral every 6 hours PRN Temp greater or equal to 38C (100.4F)    LABS: All Lab data reviewed and analyzed                        8.5    36.59 )-----------( 145      ( 27 Sep 2021 00:28 )             25.5        137  |  102  |  28<H>  ----------------------------<  221<H>  4.7   |  18<L>  |  0.52    Ca    10.2      27 Sep 2021 00:28  Phos  2.6       Mg     2.1         TPro  7.7  /  Alb  4.7  /  TBili  2.5<H>  /  DBili  x   /  AST  26  /  ALT  26  /  AlkPhos  147<H>      Ca    9.7      26 Sep 2021 02:23  Phos  3.0       Mg     1.6         TPro  8.1  /  Alb  3.5  /  TBili  3.2<H>  /  DBili  x   /  AST  50<H>  /  ALT  49<H>  /  AlkPhos  295<H>                                                                                                                                                            PTT - ( 2021 04:52 )  PTT:45.2 sec LIVER FUNCTIONS - ( 2021 00:42 )  Alb: 3.4 g/dL / Pro: 6.7 g/dL / ALK PHOS: 213 U/L / ALT: 15 U/L / AST: 24 U/L / GGT: x           RADIOLOGY: - Reviewed and analyzed RT Pig tail cathter  , LVAD HM2, CT scan of abdomen reviewed result noted

## 2021-09-27 NOTE — PROGRESS NOTE ADULT - PROBLEM SELECTOR PLAN 8
My Clinic Care Coordination Care Plan    [facility]  [address] [place]  [address]  [phone]      My Preferred Method of Contact:  Phone: [phone]    My Primary/Preferred Language:  Angela    Preferred Learning Style:  Face to face discussion, Pictures/Diagrams and Hands on teaching    Emergency Contact: Extended Emergency Contact Information  Primary Emergency Contact: Mikki Lloyd   [facility]  Home Phone: [phone]  Relation: Mother  Secondary Emergency Contact: Joey Montero   [facility]  Home Phone: [phone]  Relation: Father     PCP:  Jose Rangel MD    Hospitalizations and/or ED Visits  Most Recent: 7/23/2017    Previous:    Reason:  Headache, dizziness, hyponatremia    Concerns regarding my health: Assist and support with managing medications, assist with transportation to get to medical appointments, PCA services for ADLs, re-establish mental health providers, resolve medical bills from [place], someone to provide  services and support to live safely in the community.    Advanced Directive/Living Will: The patient was given information regarding Adanced Directives/Living Will      Toe elected to enroll in the [facility] Care Coordination.  Toe was given a copy of the Clinic Care Coordination brochure and full description of how to access their care team both during clinic hours and after hours.   My Care Guide's Name and Phone Number:  Scarlett GAVIOTA Varma; [phone].   The Care Guide and myself agreed to be in contact monthly.      Medication Update  The patient's medication list is up to date per Dr. Rangel    Health Maintenance and Immunization Update  The patient's preventive health screenings and immunizations are up to date per Dr. Rangel.    My Emergency Plan        All [facility] clinic patients have access to a Nurse 24 hours a day, 7 days a week.  If you have questions or want advice from a Nurse, please know [facility] is here  for you.  You can call your clinic and they will connect you or you can call Care Connection at [phone].  [facility] also has Walk In Care clinics in multiple locations.  Call the number listed above for more information about our Walk In Care clinics or visit the [facility] website at [email or web address].    Patient Support  I will ask my emergency contact for help in supporting me in these goals.  Relationship to patient: Father  Home # : [phone] , best time to call any time     - Likely medication related and cholecystitis related, overall stable

## 2021-09-27 NOTE — PROGRESS NOTE ADULT - PROBLEM SELECTOR PLAN 6
-  was initially decannulated on 9/23 and was ultimately reintubated on 9/26 after having multiple episodes of emesis

## 2021-09-27 NOTE — PROGRESS NOTE ADULT - ASSESSMENT
65 YO M with a history of stage D NICM s/p HM2 on 9/2017 as DT (due to severe PAD) with TV ring, prior COVID-19 infection 4/2020, recurrent syncope post LVAD s/p ILR, and chronic abdominal pain with prior negative workup who was admitted 6/14/21 with symptomatic anemia with Hgb 4.5 in setting of INR 8.8 without hemodynamic instability and has since had a protracted hospitalization. He was transfused and underwent VCE which showed active bleeding in the mid small bowel but subsequent enteroscopy 6/15 did not reveal any active bleeding. He acutely decompensated after procedure with fever/hypertension, low flow alarms, and pulmonary infiltrate with hypoxia requiring intubation from probable aspiration PNA. He was unable to extubated and has since undergone tracheostomy but tolerating persistent trach collar and nearing ability for decannulation. 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 He underwent angiogram on 9/10, stent was unable to be placed and course was then complicated by RP bleed. He continued to have persistent leukocytosis and febrile episodes and was noted to have positive sputum culture for serratia marcescens and completed his course of abx. He was initially decannulated on 9/23.    Recently he started having multiples of melena, emesis and went into acte respiratory distress. He was also noted to be in NSVT, required re-intubated and required fluid resuscitation. He remains on pressors, which will continue to wean as tolerated. His overall prognosis is poor. Will require palliative care for further discussion of GOC.

## 2021-09-27 NOTE — CONSULT NOTE ADULT - ASSESSMENT
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 coli UTIs, driveline drainage (1/7/21) and COVID-19 (back in April 2020) with prolonged hospitalization c/b acute hypoxic respiratory failure in the setting of refractory nausea/vomiting, currently mechanically ventilated, unable to participate in conversations.

## 2021-09-27 NOTE — PROGRESS NOTE ADULT - PROBLEM SELECTOR PLAN 2
- Continue current speed of 9200 RPM. Speed increased this admission due to signs of inadequately unloaded left ventricle  - Will remain off all AC and Aspirin indefinitely given recurrent GI bleeding. Recently tried heparin trail and starting having episodes of melena   - Continue to trend LDH level  - May need to consider repeating ECHO, last ECHO was completed 7/26  - Please consult palliative for GOC as patients prognosis is poor

## 2021-09-27 NOTE — CHART NOTE - NSCHARTNOTEFT_GEN_A_CORE
Nutrition Follow Up Note  Patient seen for: Malnutrition follow-up    Chart reviewed, events noted. 66M, PMH ACC/AHA stage D HF 2/2 NICM s/p HM2 LVAD (2017). Here initially for GIB prolonged hospital course, s/p tracheostomy, acalculous cholecystitis s/p perc dawn. Patient c persistent intermittent abdominal pain, per team, possible mesenteric ischemia from possible SMA stenosis; Underwent mesenteric angiogram on 9/10, found with SMA stenosis, however unable to place stent. CTA Abd/Pelvis () reveals L RP hematoma. Was tolerating TC and decannulated , transferred to [place] , then had multiple episodes of vomiting and went into acute respiratory distress to which he was subsequently intubated.    Source: EMR, Team/HF Rounds    Diet, NPO (21 @ 02:16)    Prior to NPO status, Pt receiving EN via NGT of Vital 1.5 with ordered goal rate of 65ml/hr x 24hrs       At goal EN regimen provided: 1560ml formula, 2340kcals, 105g protein, 1192ml free H2O    Nutrition-related concerns:  - Propofol ordered, if continues at current rate (12.9ml/hr) will provide additional 340kcals/day via lipids  - Pt NPO as of  @20:00  - Cholecystostomy tube - general surgery evaluated and prefers chronic tube management rather than gallbladder removal  - Multivitamin and thiamin supplementation ordered daily  - Insulin gtt & Insulin Sliding Scale ordered to optimize BG; noted to be receiving steroids     GI:  Last BM .   Bowel Regimen? [] Yes   [x] No  - tarry loose stool noted    Weight in k.1 (), 56.7 (), 58.1 (), 58.1 (), 58.9 (-), 59.7 (-), 60 (-), 60.4 (-17), 65.7 (-15), 61.4 (-), 59 (-), 58.4 (-), 63 (-), 66.2 (-), 67.7 (), 71.2 ()    Weight history:   - Admission weight: 176.3 pounds / 80 kg (6/15)  significant wt loss noted; wt trending up    MEDICATIONS  (STANDING):  dextrose 50% Injectable  fluconAZOLE IVPB  hydrocortisone sodium succinate Injectable  insulin lispro (ADMELOG) corrective regimen sliding scale  insulin regular Infusion  lidocaine   Infusion  methimazole  multivitamin  norepinephrine Infusion  pantoprazole  Injectable  piperacillin/tazobactam IVPB..  potassium chloride  10 mEq/50 mL IVPB  sodium chloride 0.9%.  thiamine  vancomycin    Solution  vancomycin  IVPB  vasopressin Infusion    Pertinent Labs:  @ 00:28: Na 137, BUN 28<H>, Cr 0.52, <H>, K+ 4.7, Phos 2.6, Mg 2.1, Alk Phos 147<H>, ALT/SGPT 26, AST/SGOT 26, HbA1c --    A1C with Estimated Average Glucose Result: 5.4 % (08-15-21 @ 13:52)  A1C with Estimated Average Glucose Result: 5.6 % (06-15-21 @ 02:42)    Finger Sticks:  POCT Blood Glucose.: 134 mg/dL ( @ 10:05)  POCT Blood Glucose.: 147 mg/dL ( @ 07:57)  POCT Blood Glucose.: 162 mg/dL ( @ 06:49)  POCT Blood Glucose.: 188 mg/dL ( @ 06:02)  POCT Blood Glucose.: 227 mg/dL ( @ 05:29)  POCT Blood Glucose.: 182 mg/dL ( @ 17:58)  POCT Blood Glucose.: 183 mg/dL ( @ 12:27)    Skin per nursing documentation: no pressure injuries   Edema: none at this time     Estimated Nutrition Needs:   Using dosing weight 78.5 kg  Energy Needs (25-30 kcal/kg): 3448-4538 kcal/day  Protein Needs (1.2-1.4 g/kg):     Previous Nutrition Diagnosis: Severe chronic malnutrition  Nutrition diagnosis is ongoing & addressed with tube feeds as tolerated    No new nutrition diagnosis at this time    Recommendations:    1. Recommend as medically feasible, reinitiate Vital 1.5, would start @ 20ml/hr and increase as tolerated to goal rate of 65ml/hr x 24hrs. At goal to provide 1560ml formula, 2340kcals, 105g protein, 1192ml free H2O (meets ~30kcals/kg & 1.33g protein/kg based on dosing wt 78.5kg).  2. Continue micronutrient supplementation as ordered  3. RD to remain available to adjust EN formulary, volume/rate PRN.     Monitoring and Evaluation:   Continue to monitor Nutritional intake, Tolerance to diet prescription, weights, labs, skin integrity  RD remains available upon request and will follow up per protocol     Wendy Travis, MS, RD, CDN, [facility]  pager #[phone]

## 2021-09-27 NOTE — CONSULT NOTE ADULT - SUBJECTIVE AND OBJECTIVE BOX
**********************  Dr. Jia Kelly, PGY-5  Hospice and Palliative Care Medicine Fellow  **********************    HPI:  65M PMH ACC/AHA stage D HF due to NICM HM2 LVAD , TV annuloplasty ring 17 as destination therapy due to severe peripheral artery disease with significant stenosis  SIADH, Depression, CKD-3 with hyperkalemia, past E. coli UTIs, driveline drainage (21) and COVID-19 (back in 2020)  He was recently seen in clinic where he complained of abdominal pain and dark stools w constipation back in May. He presents to [facility] ER today weakness and fatigue, moderate and + Black stools for three days, on coumadin secondary to warfarin use in the setting of an LVAD. Patient has required transfusions for GIB in the past. Mostly recently back in 2021 pt had anemia with dark stools. No interventions was done at that time. However Last Endoscopy was done in 2020 (negative). Today labs show patient is anemic with H/H of 4.5/16.3,. INR is 8.84 MAP in the 90s, Temp 35.1. He denies any chest pain, shortness of breath, dizziness, abd pain, nausea or vomiting.       (2021 16:57)    Palliative care team consulted for GOC. I was involved in the rounds with the heart failure team this AM. Patient continues to be vented and sedated, requiring pressors.     PERTINENT PM/SXH:   No pertinent past medical history    CHF (congestive heart failure)    CAD (coronary artery disease)    Depression    Pleural effusion    History of 2019 novel coronavirus disease (COVID-19)    Hemorrhoids    Bleeding hemorrhoids    Peripheral arterial disease    Claudication    BPH with urinary obstruction    ACC/AHA stage D systolic heart failure    Anticoagulation goal of INR 2.0 to 2.5    Falls    Clavicle fracture    CKD (chronic kidney disease), stage III    Iron deficiency anemia    H/O epistaxis    Vertigo    GI bleed      No significant past surgical history    S/P TVR (tricuspid valve repair)    S/P ventricular assist device    S/P endoscopy      FAMILY HISTORY:    ITEMS NOT CHECKED ARE NOT PRESENT    SOCIAL HISTORY:   Significant other/partner[ ]  Children[ ]  Uatsdin/Spirituality:  Substance hx:  [ ]   Tobacco hx:  [ ]   Alcohol hx: [ ]   Home Opioid hx:  [ ] I-Stop Reference No:  Living Situation: [ ]Home  [ ]Long term care  [ ]Rehab [ ]Other    ADVANCE DIRECTIVES:    DNR  MOLST  [ ]  Living Will  [ ]   DECISION MAKER(s):  [ ] Health Care Proxy(s)  [ ] Surrogate(s)  [ ] Guardian           Name(s): Phone Number(s):    BASELINE (I)ADL(s) (prior to admission):  [place]: [ ]Total  [ ] Moderate [ ]Dependent    Allergies    Amiodarone Hydrochloride (Other (Severe))    Intolerances    MEDICATIONS  (STANDING):  chlorhexidine 0.12% Liquid 15 milliLiter(s) Oral Mucosa two times a day  chlorhexidine 2% Cloths 1 Application(s) Topical <User Schedule>  dextrose 50% Injectable 25 Gram(s) IV Push once  fluconAZOLE IVPB 100 milliGRAM(s) IV Intermittent every 24 hours  hydrocortisone sodium succinate Injectable 100 milliGRAM(s) IV Push every 8 hours  insulin lispro (ADMELOG) corrective regimen sliding scale   SubCutaneous every 6 hours  insulin regular Infusion 2 Unit(s)/Hr (2 mL/Hr) IV Continuous <Continuous>  lidocaine   4% Patch 1 Patch Transdermal daily  lidocaine   Infusion 0.5 mG/Min (3.75 mL/Hr) IV Continuous <Continuous>  methimazole 10 milliGRAM(s) Oral every 8 hours  metoclopramide Injectable 10 milliGRAM(s) IV Push every 8 hours  metoprolol tartrate Injectable 2.5 milliGRAM(s) IV Push every 6 hours  multivitamin 1 Tablet(s) Oral daily  norepinephrine Infusion 0.05 MICROgram(s)/kG/Min (2.89 mL/Hr) IV Continuous <Continuous>  pantoprazole  Injectable 40 milliGRAM(s) IV Push every 12 hours  piperacillin/tazobactam IVPB.. 3.375 Gram(s) IV Intermittent every 8 hours  propofol Infusion 13.528 MICROgram(s)/kG/Min (5 mL/Hr) IV Continuous <Continuous>  sodium chloride 0.9%. 1000 milliLiter(s) (10 mL/Hr) IV Continuous <Continuous>  thiamine 100 milliGRAM(s) Oral daily  vancomycin    Solution 125 milliGRAM(s) Oral every 6 hours  vancomycin  IVPB 1000 milliGRAM(s) IV Intermittent every 12 hours  vasopressin Infusion 0.1 Unit(s)/Min (6 mL/Hr) IV Continuous <Continuous>    MEDICATIONS  (PRN):  acetaminophen    Suspension .. 650 milliGRAM(s) Enteral Tube every 6 hours PRN Mild Pain (1 - 3)  ondansetron Injectable 4 milliGRAM(s) IV Push every 6 hours PRN Nausea and/or Vomiting  simethicone 80 milliGRAM(s) Chew every 8 hours PRN Gas  sodium chloride 0.9% lock flush 10 milliLiter(s) IV Push every 1 hour PRN Pre/post blood products, medications, blood draw, and to maintain line patency    PRESENT SYMPTOMS: [ ]Unable to obtain due to poor mentation   Source if other than patient:  [ ]Family   [ ]Team     Pain: [ ]yes [ ]no  QOL impact -   Location -                    Aggravating factors -  Quality -  Radiation -  Timing-  Severity (0-10 scale):  Minimal acceptable level (0-10 scale):     CPOT:    https://www.[facility].org/getattachment/[email or web address]/Critical-Care-Pain-Observation-Tool-(CPOT)      PAIN AD Score:     http://geriatrictoolkit.[email or web address]/cog/painad.pdf (press ctrl +  left click to view)    Dyspnea:                           [ ]Mild [ ]Moderate [ ]Severe  Anxiety:                             [ ]Mild [ ]Moderate [ ]Severe  Fatigue:                             [ ]Mild [ ]Moderate [ ]Severe  Nausea:                             [ ]Mild [ ]Moderate [ ]Severe  Loss of appetite:              [ ]Mild [ ]Moderate [ ]Severe  Constipation:                    [ ]Mild [ ]Moderate [ ]Severe    Other Symptoms:  [ ]All other review of systems negative     Palliative Performance Status Version 2:         %    http://npcrc.org/files/news/palliative_performance_scale_ppsv2.pdf  PHYSICAL EXAM:  Vital Signs Last 24 Hrs  T(C): 36.5 (27 Sep 2021 12:00), Max: 38.1 (26 Sep 2021 16:00)  T(F): 97.7 (27 Sep 2021 12:00), Max: 100.6 (26 Sep 2021 16:00)  HR: 85 (27 Sep 2021 14:15) (77 - 108)  BP: --  BP(mean): --  RR: 20 (27 Sep 2021 14:15) (20 - 29)  SpO2: 100% (27 Sep 2021 14:15) (96% - 100%) I&O's Summary    26 Sep 2021 07:01  -  27 Sep 2021 07:00  --------------------------------------------------------  IN: 4147 mL / OUT: 3615 mL / NET: 532 mL    27 Sep 2021 07:01  -  27 Sep 2021 14:30  --------------------------------------------------------  IN: 693.9 mL / OUT: 320 mL / NET: 373.9 mL      GENERAL:  [ ]Alert  [ ]Oriented x   [ ]Lethargic  [ ]Cachexia  [ ]Unarousable  [ ]Verbal  [ ]Non-Verbal  Behavioral:   [ ] Anxiety  [ ] Delirium [ ] Agitation [ ] Other  HEENT:  [ ]Normal   [ ]Dry mouth   [ ]ET Tube/Trach  [ ]Oral lesions  PULMONARY:   [ ]Clear [ ]Tachypnea  [ ]Audible excessive secretions   [ ]Rhonchi        [ ]Right [ ]Left [ ]Bilateral  [ ]Crackles        [ ]Right [ ]Left [ ]Bilateral  [ ]Wheezing     [ ]Right [ ]Left [ ]Bilateral  [ ]Diminished breath sounds [ ]right [ ]left [ ]bilateral  CARDIOVASCULAR:    [ ]Regular [ ]Irregular [ ]Tachy  [ ]Jose [ ]Murmur [ ]Other  GASTROINTESTINAL:  [ ]Soft  [ ]Distended   [ ]+BS  [ ]Non tender [ ]Tender  [ ]PEG [ ]OGT/ NGT  Last BM:   GENITOURINARY:  [ ]Normal [ ] Incontinent   [ ]Oliguria/Anuria   [ ]Landrum  MUSCULOSKELETAL:   [ ]Normal   [ ]Weakness  [ ]Bed/Wheelchair bound [ ]Edema  NEUROLOGIC:   [ ]No focal deficits  [ ]Cognitive impairment  [ ]Dysphagia [ ]Dysarthria [ ]Paresis [ ]Other   SKIN:   [ ]Normal    [ ]Rash  [ ]Pressure ulcer(s)       Present on admission [ ]y [ ]n    CRITICAL CARE:  [ ] Shock Present  [ ]Septic [ ]Cardiogenic [ ]Neurologic [ ]Hypovolemic  [ ]  Vasopressors [ ]  Inotropes   [ ]Respiratory failure present [ ]Mechanical ventilation [ ]Non-invasive ventilatory support [ ]High flow  Mode: AC/ CMV (Assist Control/ Continuous Mandatory Ventilation), RR (machine): 20, TV (machine): 500, FiO2: 40, PEEP: 5, ITime: 1, MAP: 11, PIP: 25  [ ]Acute  [ ]Chronic [ ]Hypoxic  [ ]Hypercarbic [ ]Other  [ ]Other organ failure     LABS:                        8.5    36.59 )-----------( 145      ( 27 Sep 2021 00:28 )             25.5       137  |  102  |  28<H>  ----------------------------<  221<H>  4.7   |  18<L>  |  0.52    Ca    10.2      27 Sep 2021 00:28  Phos  2.6       Mg     2.1         TPro  7.7  /  Alb  4.7  /  TBili  2.5<H>  /  DBili  x   /  AST  26  /  ALT  26  /  AlkPhos  147<H>    PTT - ( 25 Sep 2021 21:11 )  PTT:29.9 sec    Urinalysis Basic - ( 26 Sep 2021 03:33 )    Color: Light Yellow / Appearance: Slightly Turbid / S.007 / pH: x  Gluc: x / Ketone: Negative  / Bili: Negative / Urobili: Negative   Blood: x / Protein: Trace / Nitrite: Negative   Leuk Esterase: Moderate / RBC: 110 /hpf / WBC 26 /HPF   Sq Epi: x / Non Sq Epi: 7 /hpf / Bacteria: Negative      RADIOLOGY & ADDITIONAL STUDIES:    PROTEIN CALORIE MALNUTRITION PRESENT: [ ]mild [ ]moderate [ ]severe [ ]underweight [ ]morbid obesity  https://www.andeal.org/vault/9230/web/files/ONC/Table_Clinical%20Characteristics%20to%20Document%20Malnutrition-White%20JV%20et%20al%2020.pdf    Height (cm): 177.8 (09-10-21 @ 13:30), 177.8 (21 @ 09:28)  Weight (kg): 61.6 (09-10-21 @ 13:30), 74 (21 @ 10:48)  BMI (kg/m2): 19.5 (09-10-21 @ 13:30), 23.4 (21 @ 10:48)    [ ]PPSV2 < or = to 30% [ ]significant weight loss  [ ]poor nutritional intake  [ ]anasarca      [ ]Artificial Nutrition      REFERRALS:   [ ]Chaplaincy  [ ]Hospice  [ ]Child Life  [ ]Social Work  [ ]Case management [ ]Holistic Therapy     Goals of Care Document:     Please do not hesitate to contact us in case of any queries or concerns via pager [phone] or Microsoft Teams.    Jia Kelly, PGY-5  Hospice and Palliative Care Medicine Fellow **********************  Dr. Jia Kelly, PGY-5  Hospice and Palliative Care Medicine Fellow  **********************    HPI:  65M PMH ACC/AHA stage D HF due to NICM HM2 LVAD , TV annuloplasty ring 17 as destination therapy due to severe peripheral artery disease with significant stenosis  SIADH, Depression, CKD-3 with hyperkalemia, past E. coli UTIs, driveline drainage (21) and COVID-19 (back in 2020)  He was recently seen in clinic where he complained of abdominal pain and dark stools w constipation back in May. He presents to [facility] ER today weakness and fatigue, moderate and + Black stools for three days, on coumadin secondary to warfarin use in the setting of an LVAD. Patient has required transfusions for GIB in the past. Mostly recently back in 2021 pt had anemia with dark stools. No interventions was done at that time. However Last Endoscopy was done in 2020 (negative). Today labs show patient is anemic with H/H of 4.5/16.3,. INR is 8.84 MAP in the 90s, Temp 35.1. He denies any chest pain, shortness of breath, dizziness, abd pain, nausea or vomiting.       (2021 16:57)    Palliative care team consulted for GOC. I was involved in the rounds with the heart failure team this AM. Patient continues to be vented and sedated, requiring pressors.   From previous conversations with the palliative care team, there have been discussions about nutrition, assessment of capacity. It appears he was okay with tube feeds but due to refractory nausea and vomiting, over the weekend became hypoxic requiring intubation.       PERTINENT PM/SXH:   No pertinent past medical history    CHF (congestive heart failure)    CAD (coronary artery disease)    Depression    Pleural effusion    History of 2019 novel coronavirus disease (COVID-19)    Hemorrhoids    Bleeding hemorrhoids    Peripheral arterial disease    Claudication    BPH with urinary obstruction    ACC/AHA stage D systolic heart failure    Anticoagulation goal of INR 2.0 to 2.5    Falls    Clavicle fracture    CKD (chronic kidney disease), stage III    Iron deficiency anemia    H/O epistaxis    Vertigo    GI bleed      No significant past surgical history    S/P TVR (tricuspid valve repair)    S/P ventricular assist device    S/P endoscopy      FAMILY HISTORY:    ITEMS NOT CHECKED ARE NOT PRESENT    SOCIAL HISTORY:   Significant other/partner[ ]  Children[x ]; Mandaeism/Spirituality:  Substance hx:  [ ]   Tobacco hx:  [ ]   Alcohol hx: [ ]   Home Opioid hx:  [ ] I-Stop Reference No:  Living Situation: [ ]Home  [ ]Long term care  [ ]Rehab [ ]Other  He lives in a home with his sister in [place],  His sister works full time.  He has 3 sons who live in [place], [place] and [facility]. His son from [place] will be arriving tomorrow to visit him.  He does have a niece Celestina who works at [employer] as an RN.     ADVANCE DIRECTIVES:    DNR  MOLST  [ ]  Living Will  [ ]   DECISION MAKER(s):  [x ] Health Care Proxy(s)  [ ] Surrogate(s)  [ ] Guardian           Name(s): Ms. Alyssa Rudolph [phone], sister    BASELINE (I)ADL(s) (prior to admission):  [place]: [ ]Total  [ ] Moderate [ ]Dependent    Allergies    Amiodarone Hydrochloride (Other (Severe))    Intolerances    MEDICATIONS  (STANDING):  chlorhexidine 0.12% Liquid 15 milliLiter(s) Oral Mucosa two times a day  chlorhexidine 2% Cloths 1 Application(s) Topical <User Schedule>  dextrose 50% Injectable 25 Gram(s) IV Push once  fluconAZOLE IVPB 100 milliGRAM(s) IV Intermittent every 24 hours  hydrocortisone sodium succinate Injectable 100 milliGRAM(s) IV Push every 8 hours  insulin lispro (ADMELOG) corrective regimen sliding scale   SubCutaneous every 6 hours  insulin regular Infusion 2 Unit(s)/Hr (2 mL/Hr) IV Continuous <Continuous>  lidocaine   4% Patch 1 Patch Transdermal daily  lidocaine   Infusion 0.5 mG/Min (3.75 mL/Hr) IV Continuous <Continuous>  methimazole 10 milliGRAM(s) Oral every 8 hours  metoclopramide Injectable 10 milliGRAM(s) IV Push every 8 hours  metoprolol tartrate Injectable 2.5 milliGRAM(s) IV Push every 6 hours  multivitamin 1 Tablet(s) Oral daily  norepinephrine Infusion 0.05 MICROgram(s)/kG/Min (2.89 mL/Hr) IV Continuous <Continuous>  pantoprazole  Injectable 40 milliGRAM(s) IV Push every 12 hours  piperacillin/tazobactam IVPB.. 3.375 Gram(s) IV Intermittent every 8 hours  propofol Infusion 13.528 MICROgram(s)/kG/Min (5 mL/Hr) IV Continuous <Continuous>  sodium chloride 0.9%. 1000 milliLiter(s) (10 mL/Hr) IV Continuous <Continuous>  thiamine 100 milliGRAM(s) Oral daily  vancomycin    Solution 125 milliGRAM(s) Oral every 6 hours  vancomycin  IVPB 1000 milliGRAM(s) IV Intermittent every 12 hours  vasopressin Infusion 0.1 Unit(s)/Min (6 mL/Hr) IV Continuous <Continuous>    MEDICATIONS  (PRN):  acetaminophen    Suspension .. 650 milliGRAM(s) Enteral Tube every 6 hours PRN Mild Pain (1 - 3)  ondansetron Injectable 4 milliGRAM(s) IV Push every 6 hours PRN Nausea and/or Vomiting  simethicone 80 milliGRAM(s) Chew every 8 hours PRN Gas  sodium chloride 0.9% lock flush 10 milliLiter(s) IV Push every 1 hour PRN Pre/post blood products, medications, blood draw, and to maintain line patency    PRESENT SYMPTOMS: [x ]Unable to obtain due to poor mentation , sedated, intubated  Source if other than patient:  [ ]Family   [ ]Team     Pain: [ ]yes [ ]no  QOL impact -   Location -                    Aggravating factors -  Quality -  Radiation -  Timing-  Severity (0-10 scale):  Minimal acceptable level (0-10 scale):     CPOT:    https://www.[facility].org/getattachment/[email or web address]/Critical-Care-Pain-Observation-Tool-(CPOT)      PAIN AD Score: 0    http://geriatrictoolkit.[email or web address]/cog/painad.pdf (press ctrl +  left click to view)    Dyspnea:                           [ ]Mild [ ]Moderate [ ]Severe  Anxiety:                             [ ]Mild [ ]Moderate [ ]Severe  Fatigue:                             [ ]Mild [ ]Moderate [ ]Severe  Nausea:                             [ ]Mild [ ]Moderate [ ]Severe  Loss of appetite:              [ ]Mild [ ]Moderate [ ]Severe  Constipation:                    [ ]Mild [ ]Moderate [ ]Severe    Other Symptoms:  [ ]All other review of systems negative     Palliative Performance Status Version 2:         %    http://[facility].org/files/news/palliative_performance_scale_ppsv2.pdf  PHYSICAL EXAM:  Vital Signs Last 24 Hrs  T(C): 36.5 (27 Sep 2021 12:00), Max: 38.1 (26 Sep 2021 16:00)  T(F): 97.7 (27 Sep 2021 12:00), Max: 100.6 (26 Sep 2021 16:00)  HR: 85 (27 Sep 2021 14:15) (77 - 108)  BP: --  BP(mean): --  RR: 20 (27 Sep 2021 14:15) (20 - 29)  SpO2: 100% (27 Sep 2021 14:15) (96% - 100%) I&O's Summary    26 Sep 2021 07:01  -  27 Sep 2021 07:00  --------------------------------------------------------  IN: 4147 mL / OUT: 3615 mL / NET: 532 mL    27 Sep 2021 07:01  -  27 Sep 2021 14:30  --------------------------------------------------------  IN: 693.9 mL / OUT: 320 mL / NET: 373.9 mL      GENERAL:  [ ]Alert  [ ]Oriented x   [ ]Lethargic  [ ]Cachexia  [x ]Unarousable  [ ]Verbal  [ ]Non-Verbal  Behavioral:   [ ] Anxiety  [ ] Delirium [ ] Agitation [ ] Other  HEENT:  [ ]Normal   [ ]Dry mouth   [ ]ET Tube/Trach  [ ]Oral lesions  PULMONARY:   [ ]Clear [ ]Tachypnea  [ ]Audible excessive secretions   [ ]Rhonchi        [ ]Right [ ]Left [ ]Bilateral  [ ]Crackles        [ ]Right [ ]Left [ ]Bilateral  [ ]Wheezing     [ ]Right [ ]Left [ ]Bilateral  [x ]Diminished breath sounds [ ]right [ ]left [x ]bilateral  CARDIOVASCULAR:    [ ]Regular [ ]Irregular [ ]Tachy  [ ]Jose [ ]Murmur [x ]Other, soft mechanical continuous sound  GASTROINTESTINAL:  [ ]Soft  [ ]Distended   [ ]+BS  [ ]Non tender [ ]Tender  [ ]PEG [ ]OGT/ NGT  Last BM:   GENITOURINARY:  [ ]Normal [ ] Incontinent   [ ]Oliguria/Anuria   [ ]Landrum  MUSCULOSKELETAL:   [ ]Normal   [ ]Weakness  [ ]Bed/Wheelchair bound [ ]Edema  NEUROLOGIC:   [ ]No focal deficits  [ ]Cognitive impairment  [ ]Dysphagia [ ]Dysarthria [ ]Paresis [ ]Other   SKIN:   [ ]Normal    [ ]Rash  [ ]Pressure ulcer(s)       Present on admission [ ]y [ ]n    CRITICAL CARE:  [x ] Shock Present  [ ]Septic [ ]Cardiogenic [ ]Neurologic [ ]Hypovolemic  [x ]  Vasopressors [ ]  Inotropes   [x ]Respiratory failure present [x ]Mechanical ventilation [ ]Non-invasive ventilatory support [ ]High flow  Mode: AC/ CMV (Assist Control/ Continuous Mandatory Ventilation), RR (machine): 20, TV (machine): 500, FiO2: 40, PEEP: 5, ITime: 1, MAP: 11, PIP: 25  [x ]Acute  [ ]Chronic [x ]Hypoxic  [ ]Hypercarbic [ ]Other  [ ]Other organ failure     LABS:                        8.5    36.59 )-----------( 145      ( 27 Sep 2021 00:28 )             25.5       137  |  102  |  28<H>  ----------------------------<  221<H>  4.7   |  18<L>  |  0.52    Ca    10.2      27 Sep 2021 00:28  Phos  2.6       Mg     2.1         TPro  7.7  /  Alb  4.7  /  TBili  2.5<H>  /  DBili  x   /  AST  26  /  ALT  26  /  AlkPhos  147<H>    PTT - ( 25 Sep 2021 21:11 )  PTT:29.9 sec    Urinalysis Basic - ( 26 Sep 2021 03:33 )    Color: Light Yellow / Appearance: Slightly Turbid / S.007 / pH: x  Gluc: x / Ketone: Negative  / Bili: Negative / Urobili: Negative   Blood: x / Protein: Trace / Nitrite: Negative   Leuk Esterase: Moderate / RBC: 110 /hpf / WBC 26 /HPF   Sq Epi: x / Non Sq Epi: 7 /hpf / Bacteria: Negative      RADIOLOGY & ADDITIONAL STUDIES:    PROTEIN CALORIE MALNUTRITION PRESENT: [ ]mild [ ]moderate [ ]severe [ ]underweight [ ]morbid obesity  https://www.andeal.org/vault/2440/web/files/ONC/Table_Clinical%20Characteristics%20to%20Document%20Malnutrition-White%20JV%20et%20al%999888.pdf    Height (cm): 177.8 (09-10-21 @ 13:30), 177.8 (21 @ 09:28)  Weight (kg): 61.6 (09-10-21 @ 13:30), 74 (21 @ 10:48)  BMI (kg/m2): 19.5 (09-10-21 @ 13:30), 23.4 (21 @ 10:48)    [ ]PPSV2 < or = to 30% [ ]significant weight loss  [ ]poor nutritional intake  [ ]anasarca      [ ]Artificial Nutrition      REFERRALS:   [ ]Chaplaincy  [ ]Hospice  [ ]Child Life  [ ]Social Work  [ ]Case management [ ]Holistic Therapy     Goals of Care Document:

## 2021-09-27 NOTE — PROGRESS NOTE ADULT - PROBLEM SELECTOR PLAN 4
- ID following and appreciate recommendations   - serratia bacteremia and poss acalculous cholecystitis. B/c with gram + cocci and many enterobacter Carbapenem resistant strain and now s/p per dawn drain  - also with enterobacter from sputum culture 8/13 and serratia marcescens from sputum culture 9/14  - Blood Cultures negative  - Leukocytosis remained elevated, currently 36.59  - CT Scan 926 showed multifocal PNA  - remains on Zosyn IV, Vanco IV, and Fluconazole IV  - continue PO Vano for C. Diff ppx   - new cultures pending

## 2021-09-27 NOTE — CONSULT NOTE ADULT - ATTENDING COMMENTS
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 coli UTIs, driveline drainage (1/7/21) and COVID-19 (back in April 2020) with prolonged hospitalization c/b acute hypoxic respiratory failure in the setting of refractory nausea/vomiting. He is currently mechanically ventilated and unable to participate in conversations.     GOC: At the time of this evaluation, the patient was not able to participate in medical decision making. His HCP is his sister, Alyssa. Will further address GOC and ACP during a FM on 9/29 at 2pm.     Francisco Burgos MD.   9am-5pm pager [phone]  After hours or weekends pager [phone].

## 2021-09-28 NOTE — PROGRESS NOTE ADULT - ASSESSMENT
Assessment and Recommendation:   · Assessment	  Assessment and recommendation :  Recurrent Acute hypoxic respiratory Failure reintubated on full ventilator support FI02 is 40%  Acute blood loss anemia S/P multiple  blood transfusion   recurrent  septic shock weaned off   vasopressin ,   hemorrhagic shock receiving 3 unit of blood transfusion   pan culture negative S/P  IV vancomycin , po vancomycin and Zosyn and Diflucan   S/P cholecystostomy tube placement by IR    AF RVR back to regular sinus Rhythm   Non ischemic cardiomyopathy continue ACE inhibitor and B-Blockers   mesenteric ischemia failure to stent SMA , no plan for repeated trial   S/P 3 unit of blood transfusion   S/P Septic shock and cardiogenic shock   Stage D systolic heart failure S/P LVAD HM2   MH2 LVAD  with  TV Annuloplasty  Severe peripheral vascular disease   severe hyperglycemia on insulin coverage    Reglan 10 mg for Gastric Motility   hyperthyroidism on Methimazole 10mg TID   critical care polyneuropathy   Anemia of Acute blood Loss   severe protein caloric malnutrition   Chronic kidney disease stage III  Depressed and withdrawn   resume NG tube feeding   GI prophylaxis with PPI   Discussed with TCV team   critical care time is 55 minutes

## 2021-09-28 NOTE — PROGRESS NOTE ADULT - SUBJECTIVE AND OBJECTIVE BOX
RICKY HAMPTON  MRN-[id number]  Patient is a 66y old  Male who presents with a chief complaint of Anemia, Supratherapeutic INR, Dark Stools (27 Sep 2021 18:57)    HPI:  65M PMH ACC/AHA stage D HF due to NICM HM2 LVAD , TV annuloplasty ring 17 as destination therapy due to severe peripheral artery disease with significant stenosis  SIADH, Depression, CKD-3 with hyperkalemia, past E. coli UTIs, driveline drainage (21) and COVID-19 (back in 2020)  He was recently seen in clinic where he complained of abdominal pain and dark stools w constipation back in May. He presents to [facility] ER today weakness and fatigue, moderate and + Black stools for three days, on coumadin secondary to warfarin use in the setting of an LVAD. Patient has required transfusions for GIB in the past. Mostly recently back in 2021 pt had anemia with dark stools. No interventions was done at that time. However Last Endoscopy was done in 2020 (negative). Today labs show patient is anemic with H/H of 4.5/16.3,. INR is 8.84 MAP in the 90s, Temp 35.1. He denies any chest pain, shortness of breath, dizziness, abd pain, nausea or vomiting.       (2021 16:57)      Surgery/Hospital Course:   admit for melena w/ anemia, INR 8.84   6/15 Capsul study (+) for small bowel bleed, balloon endoscopy (old blood in prox ileum); post EGD - septic w/ L opacity, re-intubated for concern for aspiration, TTE (Mod MR, decrease biV w/ interventricular septum boweing towards R)   bronch    +C Diff    CT C/A/P: Fluid filled colon which may be 2/2 rapid transit. Small bilateral pleffs with associates. Compressive atelectasis New ISABELLE & LLL  parenchymal opacities, suspicious for pneumonia. 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    #8 Shiley trach at bedside    LVAD speed increased to 9200   Bronch   TC since . Patient transferred to SDU.    INR today 2.64.  H&H 7.3/24 this AM.  Will repeat CBC at noon, and will send stool guaiac Patient with persistent abdominal tenderness, rate of tube feeds decreased.  No nausea/vomiting.     INR today 2.4. H&H 9.1/28.6 low flow overnight /N&V, refusing Tube feeds on D5 1/2 normal  @50 cc/hr   INR 2.69  H&H 7.7/.1 refusing Tube feeds on D5 1/2 normal  @50 cc/hr. This am + BM Melena Dr Oneill HF  aware- PRBC x1  GI team consulted -  NPO  plan on study in am-  D/w Dr Cadet Patient  to return to CTU for further management; 1PRBCS    Post op INR 2.2 today.  No bleeding. BC + for SM.  Pt is hypotensive requiring pressor and inotropic support.  ID follow up today on Cefepime will follow.   R PTC for PTX    CT C/A/P: sub q emphysema in R chest wall, GGO RUL, small ascites CTH negative; Abd US: GB thickening, pericholecystic fluid     Perchole drain in place continues to drain total output overnight 133.  Fever today 38.8    duplex LE negative    Patient with persistent abdominal pain, refusing tube feeds and medications, Psych consulted   CTA A/P ordered to r/o mesenteric ischemia 2/2 persistent anorexia, nausea, vomiting. Revealed:  Evaluation of the mesenteric vessels is limited by streak artifact from LVAD. There appears to be severe stenosis of the proximal SMA; abdominal mesenteric doppler is recommended for further evaluation. 2.  No small bowel findings to suggest acute mesenteric ischemia. 3.  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  8/15: Cultures resulted BC 1/2 +GPC in clusters, SC enterobacter; mesenteric duplex: borderline stenosis of proximal SMA  : CT C/A/P noncon: Nondular opacities in R lung apex w/cavitation, abd nl  :  Continue current care, treatment of thyrotoxicosis with medications as per endocrine, d/c ABX as per team.    RUQ sono: Contracted gallbladder with cholecystostomy tube in place.  9/10 failed SMA stent, L fem PSA, s/p 3U PRCB   CTA Abd/Pelvis L RP hematoma    Trach decannulated    intubated fro resp distress for increased WOB, LL pneumonia; CT C/A/P: progressive ISABELLE opacities and L consolidations, multifocal pneumonia     Today:    REVIEW OF SYSTEMS:  Unable to obtain, pt intubated     ICU Vital Signs Last 24 Hrs  T(C): 35.8 (28 Sep 2021 04:00), Max: 36.9 (27 Sep 2021 08:00)  T(F): 96.4 (28 Sep 2021 04:00), Max: 98.4 (27 Sep 2021 08:00)  HR: 74 (28 Sep 2021 07:00) (72 - 103)  BP: --  BP(mean): --  ABP: 115/83 (28 Sep 2021 07:00) (76/67 - 115/83)  ABP(mean): 95 (28 Sep 2021 07:00) (71 - 98)  RR: 20 (28 Sep 2021 07:00) (20 - 23)  SpO2: 100% (28 Sep 2021 07:00) (98% - 100%)      Physical Exam:  Gen:  intubated   CNS:  sedated   Neck: no JVD, gauze over trach wound  RES : coarse breath sounds, no wheezing    CVS: +LVAD hum   Abd: Soft. Sybil drain with bilious fluid. Positive BS throughout. Mild RUQ abdominal tenderness by perc sybil.  Skin: No rash, erythema, cyanosis.  Vasc: Warm and well-perfused.  Ext:  no edema      ============================I/O===========================   I&O's Detail    27 Sep 2021 07:01  -  28 Sep 2021 07:00  --------------------------------------------------------  IN:    Albumin 5%  - 250 mL: 250 mL    Enteral Tube Flush: 110 mL    Insulin: 27 mL    IV PiggyBack: 300 mL    IV PiggyBack: 950 mL    Lidocaine: 91.2 mL    Propofol: 313.4 mL    sodium chloride 0.9%: 240 mL    Vasopressin: 31 mL  Total IN: 2312.6 mL    OUT:    Drain (mL): 400 mL    Indwelling Catheter - Urethral (mL): 680 mL    Miscellaneous Tube Feedin mL    Norepinephrine: 0 mL    Voided (mL): 365 mL  Total OUT: 1445 mL    Total NET: 867.6 mL        ============================ LABS =========================                        8.8    28.31 )-----------( 120      ( 28 Sep 2021 00:24 )             26.6         138  |  106  |  22  ----------------------------<  132<H>  3.6   |  20<L>  |  0.45<L>    Ca    10.7<H>      28 Sep 2021 00:24  Phos  1.8       Mg     2.0         TPro  7.5  /  Alb  4.1  /  TBili  2.1<H>  /  DBili  x   /  AST  19  /  ALT  23  /  AlkPhos  136<H>      LIVER FUNCTIONS - ( 28 Sep 2021 00:24 )  Alb: 4.1 g/dL / Pro: 7.5 g/dL / ALK PHOS: 136 U/L / ALT: 23 U/L / AST: 19 U/L / GGT: x             ABG - ( 28 Sep 2021 00:14 )  pH, Arterial: 7.46  pH, Blood: x     /  pCO2: 35    /  pO2: 183   / HCO3: 25    / Base Excess: 1.2   /  SaO2: 100.0               ======================Micro/Rad/Cardio=================  Culture: Reviewed   CXR: Reviewed  Echo:Reviewed  ======================================================  PAST MEDICAL & SURGICAL HISTORY:  CHF (congestive heart failure)    CAD (coronary artery disease)    Depression    Pleural effusion    History of 2019 novel coronavirus disease (COVID-19)  2020    Hemorrhoids    Bleeding hemorrhoids    Peripheral arterial disease    Claudication    BPH with urinary obstruction    ACC/AHA stage D systolic heart failure    Anticoagulation goal of INR 2.0 to 2.5    Falls    Clavicle fracture    CKD (chronic kidney disease), stage III    Iron deficiency anemia    H/O epistaxis    Vertigo    GI bleed    S/P TVR (tricuspid valve repair)    S/P ventricular assist device    S/P endoscopy      ====================ASSESSMENT ==============  Stage D Nonischemic Cardiomyopathy, Status Post HM2 on 2017    Cardiogenic shock  Hemodynamic instability   Acute hypoxemic respiratory failure s/p trach , decannulated on ; Reintubated on    GI bleed , Status Post Enteroscopy   Anemia, in setting of melena   Chronic Kidney Disease  Stress hyperglycemia   C.diff positive on    Hypovolemic shock  Septic shock  Leukocytosis  GB thickening/percholecystic s/p perc choley by IT   SMA stenosis  Serratia/citrobacter pneumonia   Stenotrophomonas pneumonia   Enterobacter pneumonia   Nasuea/vomiting  Deconditioning  Hyponatremia      Plan:  ====================== NEUROLOGY=====================  Sedated with IV Propofol to maintain vent synchrony   Tylenol PRN for analgesia   Deconditioned, PT/Ambulate as tolerated    acetaminophen    Suspension .. 650 milliGRAM(s) Enteral Tube every 6 hours PRN Mild Pain (1 - 3)  propofol Infusion 13.528 MICROgram(s)/kG/Min (5 mL/Hr) IV Continuous <Continuous>    ==================== RESPIRATORY======================  Acute hypoxemic respiratory failure s/p #8 shiley trach on ; decannulated on ; Reintubated on    Continue close monitoring of respiratory rate and breathing pattern, following of ABG’s with A-line monitoring, continuous pulse oximetry monitoring.   Will start planning for re-trach, final decision will be made pending palliative recommendations - family meeting scheduled on      Mechanical Ventilation:  Mode: AC/ CMV (Assist Control/ Continuous Mandatory Ventilation)  RR (machine): 20  TV (machine): 500  FiO2: 40  PEEP: 5  ITime: 1  MAP: 12  PIP: 22      ====================CARDIOVASCULAR==================  Stage D Nonischemic Cardiomyopathy, Status Post HM2 on 2017; LVAD settings 9200, flow 3.9  TTE : reveals at least moderate MR, mild AI, severe global LV syst dysfxn, dec RV fxn   Continue invasive hemodynamic monitoring    Recent VT, continue with IV Lidocaine   Rate control with Lopressor   Pressor support with IV Levophed and IV Vasopressin    lidocaine   Infusion 0.5 mG/Min (3.75 mL/Hr) IV Continuous <Continuous>  metoprolol tartrate Injectable 2.5 milliGRAM(s) IV Push every 6 hours  norepinephrine Infusion 0.05 MICROgram(s)/kG/Min (2.89 mL/Hr) IV Continuous <Continuous>  vasopressin Infusion 0.1 Unit(s)/Min (6 mL/Hr) IV Continuous <Continuous>    ===================HEMATOLOGIC/ONC ===================  CTA A/P on  +L RP hematoma   Acute blood loss anemia, monitor H&H/Plts    Holding coumadin and ASA given recurrent GI bleeding, continue monitoring LDH     ===================== RENAL =========================  Continue monitoring urine output, I&OS, BUN/Cr   Euvolemic, even fluid balance, currently off diuretics.    Replete lytes PRN. Keep K> 4 and Mg >2     ==================== GASTROINTESTINAL===================  S/p cholecystostomy tube placed on  with IR  Tube feeds held, currently NPO   CTA A/P : Evaluation of the mesenteric vessels is limited by streak artifact from LVAD. There appears to be severe stenosis of the proximal SMA; abdominal mesenteric doppler is recommended for further evaluation. 2.  No small bowel findings to suggest acute mesenteric ischemia. 3.  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  Mesenteric duplex on 8/15 borderline stenosis of proximal SMA, s/p failed SMA stent, L fem RP hematoma on 9/10; No intervention for cholecystostomy tube, PEJ   Reglan for gut motility  Zofran PRN nausea/vomiting    multivitamin 1 Tablet(s) Oral daily  GI prophylaxis, pantoprazole  Injectable 40 milliGRAM(s) IV Push every 12 hours  simethicone 80 milliGRAM(s) Chew every 8 hours PRN Gas  sodium chloride 0.9% lock flush 10 milliLiter(s) IV Push every 1 hour PRN Pre/post blood products, medications, blood draw, and to maintain line patency  sodium chloride 0.9%. 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 - Free T4 remains elevated   Per endocrine      - Thyroid US when trach is out      - Will discuss with Endocrine re: Propanolol       - c/w Methimazole and hydrocortisone    dextrose 50% Injectable 25 Gram(s) IV Push once  hydrocortisone sodium succinate Injectable 100 milliGRAM(s) IV Push every 8 hours  insulin lispro (ADMELOG) corrective regimen sliding scale   SubCutaneous every 6 hours  insulin regular Infusion 2 Unit(s)/Hr (2 mL/Hr) IV Continuous <Continuous>  methimazole 10 milliGRAM(s) Oral every 8 hours    ========================INFECTIOUS DISEASE================  Afebrile, WBC downtrending 36.59->28.31  Continue trending WBC and monitoring fever curve   CT C/A/P : progressive ISABELLE opacities and L consolidations, multifocal pneumonia  Concern for aspiration pneumonia as per ID, C/w IVPB Vanco, Zosyn, and Flagyl for empiric coverage   Vanco solution for c.diff prophylaxis     fluconAZOLE IVPB 100 milliGRAM(s) IV Intermittent every 24 hours  piperacillin/tazobactam IVPB.. 3.375 Gram(s) IV Intermittent every 8 hours  vancomycin    Solution 125 milliGRAM(s) Oral every 6 hours  vancomycin  IVPB 1000 milliGRAM(s) IV Intermittent every 12 hours      Patient requires continuous monitoring with bedside rhythm monitoring, pulse ox monitoring, and intermittent blood gas analysis. Care plan discussed with ICU care team. Patient remained critical and at risk for life threatening decompensation.     By signing my name below, I, Agnieszka Cherry, attest that this documentation has been prepared under the direction and in the presence of CLAUDIA Bey   Electronically signed: Romel Shaffer, 21 @ 07:27    I, Luciano Ayala, personally performed the services described in this documentation. all medical record entries made by the romel were at my direction and in my presence. I have reviewed the chart and agree that the record reflects my personal performance and is accurate and complete  Electronically signed: CLAUDIA Bey        RICKY HAMPTON  MRN-[id number]  Patient is a 66y old  Male who presents with a chief complaint of Anemia, Supratherapeutic INR, Dark Stools (27 Sep 2021 18:57)    HPI:  65M PMH ACC/AHA stage D HF due to NICM HM2 LVAD , TV annuloplasty ring 17 as destination therapy due to severe peripheral artery disease with significant stenosis  SIADH, Depression, CKD-3 with hyperkalemia, past E. coli UTIs, driveline drainage (21) and COVID-19 (back in 2020)  He was recently seen in clinic where he complained of abdominal pain and dark stools w constipation back in May. He presents to [facility] ER today weakness and fatigue, moderate and + Black stools for three days, on coumadin secondary to warfarin use in the setting of an LVAD. Patient has required transfusions for GIB in the past. Mostly recently back in 2021 pt had anemia with dark stools. No interventions was done at that time. However Last Endoscopy was done in 2020 (negative). Today labs show patient is anemic with H/H of 4.5/16.3,. INR is 8.84 MAP in the 90s, Temp 35.1. He denies any chest pain, shortness of breath, dizziness, abd pain, nausea or vomiting.       (2021 16:57)      Surgery/Hospital Course:   admit for melena w/ anemia, INR 8.84   6/15 Capsul study (+) for small bowel bleed, balloon endoscopy (old blood in prox ileum); post EGD - septic w/ L opacity, re-intubated for concern for aspiration, TTE (Mod MR, decrease biV w/ interventricular septum boweing towards R)   bronch    +C Diff    CT C/A/P: Fluid filled colon which may be 2/2 rapid transit. Small bilateral pleffs with associates. Compressive atelectasis New ISABELLE & LLL  parenchymal opacities, suspicious for pneumonia. 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    #8 Shiley trach at bedside    LVAD speed increased to 9200   Bronch   TC since . Patient transferred to SDU.    INR today 2.64.  H&H 7.3/24 this AM.  Will repeat CBC at noon, and will send stool guaiac Patient with persistent abdominal tenderness, rate of tube feeds decreased.  No nausea/vomiting.     INR today 2.4. H&H 9.1/28.6 low flow overnight /N&V, refusing Tube feeds on D5 1/2 normal  @50 cc/hr   INR 2.69  H&H 7.7/.1 refusing Tube feeds on D5 1/2 normal  @50 cc/hr. This am + BM Melena Dr Oneill HF  aware- PRBC x1  GI team consulted -  NPO  plan on study in am-  D/w Dr Cadet Patient  to return to CTU for further management; 1PRBCS    Post op INR 2.2 today.  No bleeding. BC + for SM.  Pt is hypotensive requiring pressor and inotropic support.  ID follow up today on Cefepime will follow.   R PTC for PTX    CT C/A/P: sub q emphysema in R chest wall, GGO RUL, small ascites CTH negative; Abd US: GB thickening, pericholecystic fluid     Perchole drain in place continues to drain total output overnight 133.  Fever today 38.8    duplex LE negative    Patient with persistent abdominal pain, refusing tube feeds and medications, Psych consulted   CTA A/P ordered to r/o mesenteric ischemia 2/2 persistent anorexia, nausea, vomiting. Revealed:  Evaluation of the mesenteric vessels is limited by streak artifact from LVAD. There appears to be severe stenosis of the proximal SMA; abdominal mesenteric doppler is recommended for further evaluation. 2.  No small bowel findings to suggest acute mesenteric ischemia. 3.  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  8/15: Cultures resulted BC 1/2 +GPC in clusters, SC enterobacter; mesenteric duplex: borderline stenosis of proximal SMA  : CT C/A/P noncon: Nondular opacities in R lung apex w/cavitation, abd nl  :  Continue current care, treatment of thyrotoxicosis with medications as per endocrine, d/c ABX as per team.    RUQ sono: Contracted gallbladder with cholecystostomy tube in place.  9/10 failed SMA stent, L fem PSA, s/p 3U PRCB   CTA Abd/Pelvis L RP hematoma    Trach decannulated    intubated fro resp distress for increased WOB, LL pneumonia; CT C/A/P: progressive ISABELLE opacities and L consolidations, multifocal pneumonia     Today:  Will start planning for re-trach, final outcome will be made pending palliative recommendations - family meeting scheduled for tomorrow. Wean sedation with IV Propofol to assess patients neuro status.     REVIEW OF SYSTEMS:  Unable to obtain, pt intubated and sedated     ICU Vital Signs Last 24 Hrs  T(C): 35.8 (28 Sep 2021 04:00), Max: 36.9 (27 Sep 2021 08:00)  T(F): 96.4 (28 Sep 2021 04:00), Max: 98.4 (27 Sep 2021 08:00)  HR: 74 (28 Sep 2021 07:00) (72 - 103)  BP: --  BP(mean): --  ABP: 115/83 (28 Sep 2021 07:00) (76/67 - 115/83)  ABP(mean): 95 (28 Sep 2021 07:00) (71 - 98)  RR: 20 (28 Sep 2021 07:00) (20 - 23)  SpO2: 100% (28 Sep 2021 07:00) (98% - 100%)      Physical Exam:  Gen:  intubated   CNS:  sedated   Neck: no JVD, gauze over trach wound  RES : coarse breath sounds, no wheezing    CVS: +LVAD hum   Abd: Soft. Sybil drain with bilious fluid. Positive BS throughout. Mild RUQ abdominal tenderness by perc sybil.  Skin: No rash, erythema, cyanosis.  Vasc: Warm and well-perfused.  Ext:  no edema      ============================I/O===========================   I&O's Detail    27 Sep 2021 07:01  -  28 Sep 2021 07:00  --------------------------------------------------------  IN:    Albumin 5%  - 250 mL: 250 mL    Enteral Tube Flush: 110 mL    Insulin: 27 mL    IV PiggyBack: 300 mL    IV PiggyBack: 950 mL    Lidocaine: 91.2 mL    Propofol: 313.4 mL    sodium chloride 0.9%: 240 mL    Vasopressin: 31 mL  Total IN: 2312.6 mL    OUT:    Drain (mL): 400 mL    Indwelling Catheter - Urethral (mL): 680 mL    Miscellaneous Tube Feedin mL    Norepinephrine: 0 mL    Voided (mL): 365 mL  Total OUT: 1445 mL    Total NET: 867.6 mL        ============================ LABS =========================                        8.8    28.31 )-----------( 120      ( 28 Sep 2021 00:24 )             26.6         138  |  106  |  22  ----------------------------<  132<H>  3.6   |  20<L>  |  0.45<L>    Ca    10.7<H>      28 Sep 2021 00:24  Phos  1.8       Mg     2.0         TPro  7.5  /  Alb  4.1  /  TBili  2.1<H>  /  DBili  x   /  AST  19  /  ALT  23  /  AlkPhos  136<H>      LIVER FUNCTIONS - ( 28 Sep 2021 00:24 )  Alb: 4.1 g/dL / Pro: 7.5 g/dL / ALK PHOS: 136 U/L / ALT: 23 U/L / AST: 19 U/L / GGT: x             ABG - ( 28 Sep 2021 00:14 )  pH, Arterial: 7.46  pH, Blood: x     /  pCO2: 35    /  pO2: 183   / HCO3: 25    / Base Excess: 1.2   /  SaO2: 100.0               ======================Micro/Rad/Cardio=================  Culture: Reviewed   CXR: Reviewed  Echo:Reviewed  ======================================================  PAST MEDICAL & SURGICAL HISTORY:  CHF (congestive heart failure)    CAD (coronary artery disease)    Depression    Pleural effusion    History of  novel coronavirus disease (COVID-19)  2020    Hemorrhoids    Bleeding hemorrhoids    Peripheral arterial disease    Claudication    BPH with urinary obstruction    ACC/AHA stage D systolic heart failure    Anticoagulation goal of INR 2.0 to 2.5    Falls    Clavicle fracture    CKD (chronic kidney disease), stage III    Iron deficiency anemia    H/O epistaxis    Vertigo    GI bleed    S/P TVR (tricuspid valve repair)    S/P ventricular assist device    S/P endoscopy      ====================ASSESSMENT ==============  Stage D Nonischemic Cardiomyopathy, Status Post HM2 on 2017    Cardiogenic shock  Hemodynamic instability   Acute hypoxemic respiratory failure s/p trach , decannulated on ; Reintubated on    GI bleed , Status Post Enteroscopy   Anemia, in setting of melena   Chronic Kidney Disease  Stress hyperglycemia   C.diff positive on    Hypovolemic shock  Septic shock  Leukocytosis  GB thickening/percholecystic s/p perc choley by IT   SMA stenosis  Serratia/citrobacter pneumonia   Stenotrophomonas pneumonia   Enterobacter pneumonia   Nasuea/vomiting  Deconditioning  Hyponatremia      Plan:  ====================== NEUROLOGY=====================  Sedated with IV Propofol to maintain vent synchrony, wean as tolerated to assess neuro status   Deconditioned, PT as tolerated  Tylenol PRN for analgesia     acetaminophen    Suspension .. 650 milliGRAM(s) Enteral Tube every 6 hours PRN Mild Pain (1 - 3)  propofol Infusion 13.528 MICROgram(s)/kG/Min (5 mL/Hr) IV Continuous <Continuous>    ==================== RESPIRATORY======================  Acute hypoxemic respiratory failure s/p #8 shiley trach on ; decannulated on ; Reintubated on    Continue close monitoring of respiratory rate and breathing pattern, following of ABG’s with A-line monitoring, continuous pulse oximetry monitoring.   Will start planning for re-trach, final decision will be made pending palliative recommendations - family meeting scheduled on      Mechanical Ventilation:  Mode: AC/ CMV (Assist Control/ Continuous Mandatory Ventilation)  RR (machine): 20  TV (machine): 500  FiO2: 40  PEEP: 5  ITime: 1  MAP: 12  PIP: 22      ====================CARDIOVASCULAR==================  Stage D Nonischemic Cardiomyopathy, Status Post HM2 on 2017; LVAD settings 9200, flow 3.9  TTE : EF 12%, mild-mod AR, mild-mod TR, severe global LV systolic dysfxn, RV enlargement with decreased fxn  Continue invasive hemodynamic monitoring    Recent VT, continue with IV Lidocaine   Rate control with Lopressor   Pressor support with IV Levophed and IV Vasopressin    lidocaine   Infusion 0.5 mG/Min (3.75 mL/Hr) IV Continuous <Continuous>  metoprolol tartrate Injectable 2.5 milliGRAM(s) IV Push every 6 hours  norepinephrine Infusion 0.05 MICROgram(s)/kG/Min (2.89 mL/Hr) IV Continuous <Continuous>  vasopressin Infusion 0.1 Unit(s)/Min (6 mL/Hr) IV Continuous <Continuous>    ===================HEMATOLOGIC/ONC ===================  CTA A/P on  +L RP hematoma   Acute blood loss anemia, monitor H&H/Plts    Holding coumadin and ASA given recurrent GI bleeding, continue monitoring LDH     ===================== RENAL =========================  Continue monitoring urine output, I&OS, BUN/Cr   Euvolemic, even fluid balance, currently off diuretics.    Replete lytes PRN. Keep K> 4 and Mg >2     ==================== GASTROINTESTINAL===================  S/p cholecystostomy tube placed on  with IR  Tube feeds held, currently NPO   CTA A/P : Evaluation of the mesenteric vessels is limited by streak artifact from LVAD. There appears to be severe stenosis of the proximal SMA; abdominal mesenteric doppler is recommended for further evaluation. 2.  No small bowel findings to suggest acute mesenteric ischemia. 3.  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  Mesenteric duplex on 8/15 borderline stenosis of proximal SMA, s/p failed SMA stent, L fem RP hematoma on 9/10; No intervention for cholecystostomy tube, PEJ   Reglan for gut motility  Zofran PRN nausea/vomiting    multivitamin 1 Tablet(s) Oral daily  GI prophylaxis, pantoprazole  Injectable 40 milliGRAM(s) IV Push every 12 hours  simethicone 80 milliGRAM(s) Chew every 8 hours PRN Gas  sodium chloride 0.9% lock flush 10 milliLiter(s) IV Push every 1 hour PRN Pre/post blood products, medications, blood draw, and to maintain line patency  sodium chloride 0.9%. 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 - Free T4 remains elevated   Per endocrine      - Thyroid US when trach is out      - Will discuss with Endocrine re: Propanolol       - c/w Methimazole and hydrocortisone    dextrose 50% Injectable 25 Gram(s) IV Push once  hydrocortisone sodium succinate Injectable 100 milliGRAM(s) IV Push every 8 hours  insulin lispro (ADMELOG) corrective regimen sliding scale   SubCutaneous every 6 hours  insulin regular Infusion 2 Unit(s)/Hr (2 mL/Hr) IV Continuous <Continuous>  methimazole 10 milliGRAM(s) Oral every 8 hours    ========================INFECTIOUS DISEASE================  Afebrile, WBC downtrending 36.59->28.31  Continue trending WBC and monitoring fever curve   CT C/A/P : progressive ISABELLE opacities and L consolidations, multifocal pneumonia  Concern for aspiration pneumonia as per ID, C/w IVPB Vanco, Zosyn, and Flagyl for empiric coverage   Vanco solution for c.diff prophylaxis     fluconAZOLE IVPB 100 milliGRAM(s) IV Intermittent every 24 hours  piperacillin/tazobactam IVPB.. 3.375 Gram(s) IV Intermittent every 8 hours  vancomycin    Solution 125 milliGRAM(s) Oral every 6 hours  vancomycin  IVPB 1000 milliGRAM(s) IV Intermittent every 12 hours      Patient requires continuous monitoring with bedside rhythm monitoring, pulse ox monitoring, and intermittent blood gas analysis. Care plan discussed with ICU care team. Patient remained critical and at risk for life threatening decompensation.     By signing my name below, I, Agnieszka Cherry, attest that this documentation has been prepared under the direction and in the presence of CLAUDIA Bey   Electronically signed: Romel Shaffer, 21 @ 07:27    I, Luciano Ayala, personally performed the services described in this documentation. all medical record entries made by the romel were at my direction and in my presence. I have reviewed the chart and agree that the record reflects my personal performance and is accurate and complete  Electronically signed: CLAUDIA Bey        RICKY HAMPTON  MRN-[id number]  Patient is a 66y old  Male who presents with a chief complaint of Anemia, Supratherapeutic INR, Dark Stools (27 Sep 2021 18:57)    HPI:  65M PMH ACC/AHA stage D HF due to NICM HM2 LVAD , TV annuloplasty ring 17 as destination therapy due to severe peripheral artery disease with significant stenosis  SIADH, Depression, CKD-3 with hyperkalemia, past E. coli UTIs, driveline drainage (21) and COVID-19 (back in 2020)  He was recently seen in clinic where he complained of abdominal pain and dark stools w constipation back in May. He presents to [facility] ER today weakness and fatigue, moderate and + Black stools for three days, on coumadin secondary to warfarin use in the setting of an LVAD. Patient has required transfusions for GIB in the past. Mostly recently back in 2021 pt had anemia with dark stools. No interventions was done at that time. However Last Endoscopy was done in 2020 (negative). Today labs show patient is anemic with H/H of 4.5/16.3,. INR is 8.84 MAP in the 90s, Temp 35.1. He denies any chest pain, shortness of breath, dizziness, abd pain, nausea or vomiting.       (2021 16:57)      Surgery/Hospital Course:   admit for melena w/ anemia, INR 8.84   6/15 Capsul study (+) for small bowel bleed, balloon endoscopy (old blood in prox ileum); post EGD - septic w/ L opacity, re-intubated for concern for aspiration, TTE (Mod MR, decrease biV w/ interventricular septum boweing towards R)   bronch    +C Diff    CT C/A/P: Fluid filled colon which may be 2/2 rapid transit. Small bilateral pleffs with associates. Compressive atelectasis New ISABELLE & LLL  parenchymal opacities, suspicious for pneumonia. 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    #8 Shiley trach at bedside    LVAD speed increased to 9200   Bronch   TC since . Patient transferred to SDU.    INR today 2.64.  H&H 7.3/24 this AM.  Will repeat CBC at noon, and will send stool guaiac Patient with persistent abdominal tenderness, rate of tube feeds decreased.  No nausea/vomiting.     INR today 2.4. H&H 9.1/28.6 low flow overnight /N&V, refusing Tube feeds on D5 1/2 normal  @50 cc/hr   INR 2.69  H&H 7.7/.1 refusing Tube feeds on D5 1/2 normal  @50 cc/hr. This am + BM Melena Dr Oneill HF  aware- PRBC x1  GI team consulted -  NPO  plan on study in am-  D/w Dr Cadet Patient  to return to CTU for further management; 1PRBCS    Post op INR 2.2 today.  No bleeding. BC + for SM.  Pt is hypotensive requiring pressor and inotropic support.  ID follow up today on Cefepime will follow.   R PTC for PTX    CT C/A/P: sub q emphysema in R chest wall, GGO RUL, small ascites CTH negative; Abd US: GB thickening, pericholecystic fluid     Perchole drain in place continues to drain total output overnight 133.  Fever today 38.8    duplex LE negative    Patient with persistent abdominal pain, refusing tube feeds and medications, Psych consulted   CTA A/P ordered to r/o mesenteric ischemia 2/2 persistent anorexia, nausea, vomiting. Revealed:  Evaluation of the mesenteric vessels is limited by streak artifact from LVAD. There appears to be severe stenosis of the proximal SMA; abdominal mesenteric doppler is recommended for further evaluation. 2.  No small bowel findings to suggest acute mesenteric ischemia. 3.  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  8/15: Cultures resulted BC 1/2 +GPC in clusters, SC enterobacter; mesenteric duplex: borderline stenosis of proximal SMA  : CT C/A/P noncon: Nondular opacities in R lung apex w/cavitation, abd nl  :  Continue current care, treatment of thyrotoxicosis with medications as per endocrine, d/c ABX as per team.    RUQ sono: Contracted gallbladder with cholecystostomy tube in place.  9/10 failed SMA stent, L fem PSA, s/p 3U PRCB   CTA Abd/Pelvis L RP hematoma    Trach decannulated    intubated fro resp distress for increased WOB, LL pneumonia; CT C/A/P: progressive ISABELLE opacities and L consolidations, multifocal pneumonia     Today:  Will start planning for re-trach, final outcome will be made pending palliative recommendations - family meeting scheduled for tomorrow. Wean sedation with IV Propofol to assess patients neuro status.     REVIEW OF SYSTEMS:  Unable to obtain, pt intubated and sedated     ICU Vital Signs Last 24 Hrs  T(C): 35.8 (28 Sep 2021 04:00), Max: 36.9 (27 Sep 2021 08:00)  T(F): 96.4 (28 Sep 2021 04:00), Max: 98.4 (27 Sep 2021 08:00)  HR: 74 (28 Sep 2021 07:00) (72 - 103)  BP: --  BP(mean): --  ABP: 115/83 (28 Sep 2021 07:00) (76/67 - 115/83)  ABP(mean): 95 (28 Sep 2021 07:00) (71 - 98)  RR: 20 (28 Sep 2021 07:00) (20 - 23)  SpO2: 100% (28 Sep 2021 07:00) (98% - 100%)      Physical Exam:  Gen:  intubated   CNS:  sedated   Neck: no JVD, gauze over trach wound  RES : coarse breath sounds, no wheezing    CVS: +LVAD hum   Abd: Soft. Sybil drain with bilious fluid. Positive BS throughout. Mild RUQ abdominal tenderness by perc sybil.  Skin: No rash, erythema, cyanosis.  Vasc: Warm and well-perfused.  Ext:  no edema      ============================I/O===========================   I&O's Detail    27 Sep 2021 07:01  -  28 Sep 2021 07:00  --------------------------------------------------------  IN:    Albumin 5%  - 250 mL: 250 mL    Enteral Tube Flush: 110 mL    Insulin: 27 mL    IV PiggyBack: 300 mL    IV PiggyBack: 950 mL    Lidocaine: 91.2 mL    Propofol: 313.4 mL    sodium chloride 0.9%: 240 mL    Vasopressin: 31 mL  Total IN: 2312.6 mL    OUT:    Drain (mL): 400 mL    Indwelling Catheter - Urethral (mL): 680 mL    Miscellaneous Tube Feedin mL    Norepinephrine: 0 mL    Voided (mL): 365 mL  Total OUT: 1445 mL    Total NET: 867.6 mL        ============================ LABS =========================                        8.8    28.31 )-----------( 120      ( 28 Sep 2021 00:24 )             26.6         138  |  106  |  22  ----------------------------<  132<H>  3.6   |  20<L>  |  0.45<L>    Ca    10.7<H>      28 Sep 2021 00:24  Phos  1.8       Mg     2.0         TPro  7.5  /  Alb  4.1  /  TBili  2.1<H>  /  DBili  x   /  AST  19  /  ALT  23  /  AlkPhos  136<H>      LIVER FUNCTIONS - ( 28 Sep 2021 00:24 )  Alb: 4.1 g/dL / Pro: 7.5 g/dL / ALK PHOS: 136 U/L / ALT: 23 U/L / AST: 19 U/L / GGT: x             ABG - ( 28 Sep 2021 00:14 )  pH, Arterial: 7.46  pH, Blood: x     /  pCO2: 35    /  pO2: 183   / HCO3: 25    / Base Excess: 1.2   /  SaO2: 100.0               ======================Micro/Rad/Cardio=================  Culture: Reviewed   CXR: Reviewed  Echo:Reviewed  ======================================================  PAST MEDICAL & SURGICAL HISTORY:  CHF (congestive heart failure)    CAD (coronary artery disease)    Depression    Pleural effusion    History of  novel coronavirus disease (COVID-19)  2020    Hemorrhoids    Bleeding hemorrhoids    Peripheral arterial disease    Claudication    BPH with urinary obstruction    ACC/AHA stage D systolic heart failure    Anticoagulation goal of INR 2.0 to 2.5    Falls    Clavicle fracture    CKD (chronic kidney disease), stage III    Iron deficiency anemia    H/O epistaxis    Vertigo    GI bleed    S/P TVR (tricuspid valve repair)    S/P ventricular assist device    S/P endoscopy      ====================ASSESSMENT ==============  Stage D Nonischemic Cardiomyopathy, Status Post HM2 on 2017    Cardiogenic shock  Hemodynamic instability   Acute hypoxemic respiratory failure s/p trach , decannulated on ; Reintubated on    GI bleed , Status Post Enteroscopy   Anemia, in setting of melena   Chronic Kidney Disease  Stress hyperglycemia   C.diff positive on    Hypovolemic shock  Septic shock  Leukocytosis  GB thickening/percholecystic s/p perc choley by IT   SMA stenosis  Serratia/citrobacter pneumonia   Stenotrophomonas pneumonia   Enterobacter pneumonia   Nasuea/vomiting  Deconditioning  Hyponatremia      Plan:  ====================== NEUROLOGY=====================  Sedated with IV Propofol to maintain vent synchrony, wean as tolerated to assess neuro status   Deconditioned, PT as tolerated  Tylenol PRN for analgesia     acetaminophen    Suspension .. 650 milliGRAM(s) Enteral Tube every 6 hours PRN Mild Pain (1 - 3)  propofol Infusion 13.528 MICROgram(s)/kG/Min (5 mL/Hr) IV Continuous <Continuous>    ==================== RESPIRATORY======================  Acute hypoxemic respiratory failure s/p #8 shiley trach on ; decannulated on ; Reintubated on    Continue close monitoring of respiratory rate and breathing pattern, following of ABG’s with A-line monitoring, continuous pulse oximetry monitoring.   Will start planning for re-trach, final decision will be made pending palliative recommendations - family meeting scheduled on      Mechanical Ventilation:  Mode: AC/ CMV (Assist Control/ Continuous Mandatory Ventilation)  RR (machine): 20  TV (machine): 500  FiO2: 40  PEEP: 5  ITime: 1  MAP: 12  PIP: 22      ====================CARDIOVASCULAR==================  Stage D Nonischemic Cardiomyopathy, Status Post HM2 on 2017; LVAD settings 9200, flow 3.9  TTE : EF 12%, mild-mod AR, mild-mod TR, severe global LV systolic dysfxn, RV enlargement with decreased fxn  Continue invasive hemodynamic monitoring    Recent VT, continue with IV Lidocaine   Rate control with Lopressor   Pressor support with IV Levophed and IV Vasopressin    lidocaine   Infusion 0.5 mG/Min (3.75 mL/Hr) IV Continuous <Continuous>  metoprolol tartrate Injectable 2.5 milliGRAM(s) IV Push every 6 hours  norepinephrine Infusion 0.05 MICROgram(s)/kG/Min (2.89 mL/Hr) IV Continuous <Continuous>  vasopressin Infusion 0.1 Unit(s)/Min (6 mL/Hr) IV Continuous <Continuous>    ===================HEMATOLOGIC/ONC ===================  CTA A/P on  +L RP hematoma   Acute blood loss anemia, monitor H&H/Plts    Holding coumadin and ASA given recurrent GI bleeding, continue monitoring LDH     ===================== RENAL =========================  Continue monitoring urine output, I&OS, BUN/Cr   Euvolemic, even fluid balance, currently off diuretics.    Replete lytes PRN. Keep K> 4 and Mg >2     ==================== GASTROINTESTINAL===================  S/p cholecystostomy tube placed on  with IR  Tube feeds held, currently NPO   CTA A/P : Evaluation of the mesenteric vessels is limited by streak artifact from LVAD. There appears to be severe stenosis of the proximal SMA; abdominal mesenteric doppler is recommended for further evaluation. 2.  No small bowel findings to suggest acute mesenteric ischemia. 3.  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  Mesenteric duplex on 8/15 borderline stenosis of proximal SMA, s/p failed SMA stent, L fem RP hematoma on 9/10; No intervention for cholecystostomy tube, PEJ   Reglan for gut motility  Zofran PRN nausea/vomiting    multivitamin 1 Tablet(s) Oral daily  GI prophylaxis, pantoprazole  Injectable 40 milliGRAM(s) IV Push every 12 hours  simethicone 80 milliGRAM(s) Chew every 8 hours PRN Gas  sodium chloride 0.9% lock flush 10 milliLiter(s) IV Push every 1 hour PRN Pre/post blood products, medications, blood draw, and to maintain line patency  sodium chloride 0.9%. 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 - Free T4 remains elevated   Per endocrine      - Thyroid US when trach is out      - c/w Methimazole and hydrocortisone    dextrose 50% Injectable 25 Gram(s) IV Push once  hydrocortisone sodium succinate Injectable 100 milliGRAM(s) IV Push every 8 hours  insulin lispro (ADMELOG) corrective regimen sliding scale   SubCutaneous every 6 hours  insulin regular Infusion 2 Unit(s)/Hr (2 mL/Hr) IV Continuous <Continuous>  methimazole 10 milliGRAM(s) Oral every 8 hours    ========================INFECTIOUS DISEASE================  Afebrile, WBC downtrending 36.59->28.31  Continue trending WBC and monitoring fever curve   CT C/A/P : progressive ISABELLE opacities and L consolidations, multifocal pneumonia  Concern for aspiration pneumonia as per ID, C/w IVPB Vanco, Zosyn, and Flagyl for empiric coverage   Vanco solution for c.diff prophylaxis     fluconAZOLE IVPB 100 milliGRAM(s) IV Intermittent every 24 hours  piperacillin/tazobactam IVPB.. 3.375 Gram(s) IV Intermittent every 8 hours  vancomycin    Solution 125 milliGRAM(s) Oral every 6 hours  vancomycin  IVPB 1000 milliGRAM(s) IV Intermittent every 12 hours      Patient requires continuous monitoring with bedside rhythm monitoring, pulse ox monitoring, and intermittent blood gas analysis. Care plan discussed with ICU care team. Patient remained critical and at risk for life threatening decompensation.     By signing my name below, I, Agnieszka Cherry, attest that this documentation has been prepared under the direction and in the presence of CLAUDIA Bey   Electronically signed: Romel Shaffer, 21 @ 07:27    I, Luciano Ayala, personally performed the services described in this documentation. all medical record entries made by the romel were at my direction and in my presence. I have reviewed the chart and agree that the record reflects my personal performance and is accurate and complete  Electronically signed: CLAUDIA Bey        RICKY HAMPTON  MRN-[id number]  Patient is a 66y old  Male who presents with a chief complaint of Anemia, Supratherapeutic INR, Dark Stools (27 Sep 2021 18:57)    HPI:  65M PMH ACC/AHA stage D HF due to NICM HM2 LVAD , TV annuloplasty ring 17 as destination therapy due to severe peripheral artery disease with significant stenosis  SIADH, Depression, CKD-3 with hyperkalemia, past E. coli UTIs, driveline drainage (21) and COVID-19 (back in 2020)  He was recently seen in clinic where he complained of abdominal pain and dark stools w constipation back in May. He presents to [facility] ER today weakness and fatigue, moderate and + Black stools for three days, on coumadin secondary to warfarin use in the setting of an LVAD. Patient has required transfusions for GIB in the past. Mostly recently back in 2021 pt had anemia with dark stools. No interventions was done at that time. However Last Endoscopy was done in 2020 (negative). Today labs show patient is anemic with H/H of 4.5/16.3,. INR is 8.84 MAP in the 90s, Temp 35.1. He denies any chest pain, shortness of breath, dizziness, abd pain, nausea or vomiting.       (2021 16:57)      Surgery/Hospital Course:   admit for melena w/ anemia, INR 8.84   6/15 Capsul study (+) for small bowel bleed, balloon endoscopy (old blood in prox ileum); post EGD - septic w/ L opacity, re-intubated for concern for aspiration, TTE (Mod MR, decrease biV w/ interventricular septum boweing towards R)   bronch    +C Diff    CT C/A/P: Fluid filled colon which may be 2/2 rapid transit. Small bilateral pleffs with associates. Compressive atelectasis New ISABELLE & LLL  parenchymal opacities, suspicious for pneumonia. 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    #8 Shiley trach at bedside    LVAD speed increased to 9200   Bronch   TC since . Patient transferred to SDU.    INR today 2.64.  H&H 7.3/24 this AM.  Will repeat CBC at noon, and will send stool guaiac Patient with persistent abdominal tenderness, rate of tube feeds decreased.  No nausea/vomiting.     INR today 2.4. H&H 9.1/28.6 low flow overnight /N&V, refusing Tube feeds on D5 1/2 normal  @50 cc/hr   INR 2.69  H&H 7.7/.1 refusing Tube feeds on D5 1/2 normal  @50 cc/hr. This am + BM Melena Dr Oneill HF  aware- PRBC x1  GI team consulted -  NPO  plan on study in am-  D/w Dr Cadet Patient  to return to CTU for further management; 1PRBCS    Post op INR 2.2 today.  No bleeding. BC + for SM.  Pt is hypotensive requiring pressor and inotropic support.  ID follow up today on Cefepime will follow.   R PTC for PTX    CT C/A/P: sub q emphysema in R chest wall, GGO RUL, small ascites CTH negative; Abd US: GB thickening, pericholecystic fluid     Perchole drain in place continues to drain total output overnight 133.  Fever today 38.8    duplex LE negative    Patient with persistent abdominal pain, refusing tube feeds and medications, Psych consulted   CTA A/P ordered to r/o mesenteric ischemia 2/2 persistent anorexia, nausea, vomiting. Revealed:  Evaluation of the mesenteric vessels is limited by streak artifact from LVAD. There appears to be severe stenosis of the proximal SMA; abdominal mesenteric doppler is recommended for further evaluation. 2.  No small bowel findings to suggest acute mesenteric ischemia. 3.  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  8/15: Cultures resulted BC 1/2 +GPC in clusters, SC enterobacter; mesenteric duplex: borderline stenosis of proximal SMA  : CT C/A/P noncon: Nondular opacities in R lung apex w/cavitation, abd nl  :  Continue current care, treatment of thyrotoxicosis with medications as per endocrine, d/c ABX as per team.    RUQ sono: Contracted gallbladder with cholecystostomy tube in place.  9/10 failed SMA stent, L fem PSA, s/p 3U PRCB   CTA Abd/Pelvis L RP hematoma    Trach decannulated    intubated fro resp distress for increased WOB, LL pneumonia; CT C/A/P: progressive ISABELLE opacities and L consolidations, multifocal pneumonia     Today:  Off pressor [id number], continue to monitor hemodynamics closely. Recent VT on IV Lidocaine, wean to off as tolerated. Plan to restart trickle feeds. Will start planning for re-trach, will consult ENT, final outcome will be made pending palliative recommendations - family meeting scheduled for tomorrow. Wean sedation with IV Propofol to assess patients neuro status and discuss plan for re-trach with patient. Will f/u with Endocrine re: management of hyperthyroidism in setting of elevated T3    REVIEW OF SYSTEMS:  Unable to obtain, pt intubated and sedated     ICU Vital Signs Last 24 Hrs  T(C): 35.8 (28 Sep 2021 04:00), Max: 36.9 (27 Sep 2021 08:00)  T(F): 96.4 (28 Sep 2021 04:00), Max: 98.4 (27 Sep 2021 08:00)  HR: 74 (28 Sep 2021 07:00) (72 - 103)  BP: --  BP(mean): --  ABP: 115/83 (28 Sep 2021 07:00) (76/67 - 115/83)  ABP(mean): 95 (28 Sep 2021 07:00) (71 - 98)  RR: 20 (28 Sep 2021 07:00) (20 - 23)  SpO2: 100% (28 Sep 2021 07:00) (98% - 100%)      Physical Exam:  Gen:  intubated   CNS:  sedated   Neck: no JVD, gauze over trach wound  RES : coarse breath sounds, no wheezing    CVS: +LVAD hum   Abd: Soft. Sybil drain with bilious fluid. Positive BS throughout. Mild RUQ abdominal tenderness by perc sybil.  Skin: No rash, erythema, cyanosis.  Vasc: Warm and well-perfused.  Ext:  no edema      ============================I/O===========================   I&O's Detail    27 Sep 2021 07:01  -  28 Sep 2021 07:00  --------------------------------------------------------  IN:    Albumin 5%  - 250 mL: 250 mL    Enteral Tube Flush: 110 mL    Insulin: 27 mL    IV PiggyBack: 300 mL    IV PiggyBack: 950 mL    Lidocaine: 91.2 mL    Propofol: 313.4 mL    sodium chloride 0.9%: 240 mL    Vasopressin: 31 mL  Total IN: 2312.6 mL    OUT:    Drain (mL): 400 mL    Indwelling Catheter - Urethral (mL): 680 mL    Miscellaneous Tube Feedin mL    Norepinephrine: 0 mL    Voided (mL): 365 mL  Total OUT: 1445 mL    Total NET: 867.6 mL        ============================ LABS =========================                        8.8    28.31 )-----------( 120      ( 28 Sep 2021 00:24 )             26.6         138  |  106  |  22  ----------------------------<  132<H>  3.6   |  20<L>  |  0.45<L>    Ca    10.7<H>      28 Sep 2021 00:24  Phos  1.8       Mg     2.0         TPro  7.5  /  Alb  4.1  /  TBili  2.1<H>  /  DBili  x   /  AST  19  /  ALT  23  /  AlkPhos  136<H>      LIVER FUNCTIONS - ( 28 Sep 2021 00:24 )  Alb: 4.1 g/dL / Pro: 7.5 g/dL / ALK PHOS: 136 U/L / ALT: 23 U/L / AST: 19 U/L / GGT: x             ABG - ( 28 Sep 2021 00:14 )  pH, Arterial: 7.46  pH, Blood: x     /  pCO2: 35    /  pO2: 183   / HCO3: 25    / Base Excess: 1.2   /  SaO2: 100.0               ======================Micro/Rad/Cardio=================  Culture: Reviewed   CXR: Reviewed  Echo:Reviewed  ======================================================  PAST MEDICAL & SURGICAL HISTORY:  CHF (congestive heart failure)    CAD (coronary artery disease)    Depression    Pleural effusion    History of 2019 novel coronavirus disease (COVID-19)  2020    Hemorrhoids    Bleeding hemorrhoids    Peripheral arterial disease    Claudication    BPH with urinary obstruction    ACC/AHA stage D systolic heart failure    Anticoagulation goal of INR 2.0 to 2.5    Falls    Clavicle fracture    CKD (chronic kidney disease), stage III    Iron deficiency anemia    H/O epistaxis    Vertigo    GI bleed    S/P TVR (tricuspid valve repair)    S/P ventricular assist device    S/P endoscopy      ====================ASSESSMENT ==============  Stage D Nonischemic Cardiomyopathy, Status Post HM2 on 2017    Cardiogenic shock  Hemodynamic instability   Acute hypoxemic respiratory failure s/p trach , decannulated on ; Reintubated on    GI bleed , Status Post Enteroscopy   Anemia, in setting of melena   Chronic Kidney Disease  Stress hyperglycemia   C.diff positive on    Hypovolemic shock  Septic shock  Leukocytosis  GB thickening/percholecystic s/p perc choley by IT   SMA stenosis  Serratia/citrobacter pneumonia   Stenotrophomonas pneumonia   Enterobacter pneumonia   Nasuea/vomiting  Deconditioning  Hyponatremia      Plan:  ====================== NEUROLOGY=====================  Sedated with IV Propofol to maintain vent synchrony, wean as tolerated to assess neuro status   Deconditioned, PT as tolerated  Tylenol PRN for analgesia     acetaminophen    Suspension .. 650 milliGRAM(s) Enteral Tube every 6 hours PRN Mild Pain (1 - 3)  propofol Infusion 13.528 MICROgram(s)/kG/Min (5 mL/Hr) IV Continuous <Continuous>    ==================== RESPIRATORY======================  Acute hypoxemic respiratory failure s/p #8 shiley trach on ; decannulated on ; Reintubated on    Continue close monitoring of respiratory rate and breathing pattern, following of ABG’s with A-line monitoring, continuous pulse oximetry monitoring.   Will start planning for re-trach, will re-consult ENT, final decision will be made pending palliative recommendations - family meeting scheduled on      Mechanical Ventilation:  Mode: AC/ CMV (Assist Control/ Continuous Mandatory Ventilation)  RR (machine): 20  TV (machine): 500  FiO2: 40  PEEP: 5  ITime: 1  MAP: 12  PIP: 22      ====================CARDIOVASCULAR==================  Stage D Nonischemic Cardiomyopathy, Status Post HM2 on 2017; LVAD settings 9200, flow 3.9  TTE : EF 12%, mild-mod AR, mild-mod TR, severe global LV systolic dysfxn, RV enlargement with decreased fxn  Recent VT on IV Lidocaine, wean to off as tolerated today  Off pressor [id number], continue to monitor hemodynamics closely.   Rate control with Lopressor     lidocaine   Infusion 0.5 mG/Min (3.75 mL/Hr) IV Continuous <Continuous>  metoprolol tartrate Injectable 2.5 milliGRAM(s) IV Push every 6 hours    ===================HEMATOLOGIC/ONC ===================  CTA A/P on  +L RP hematoma   Acute blood loss anemia, monitor H&H/Plts    Holding coumadin and ASA given recurrent GI bleeding, continue monitoring LDH     ===================== RENAL =========================  Continue monitoring urine output, I&OS, BUN/Cr   Euvolemic, even fluid balance, currently off diuretics.    Replete lytes PRN. Keep K> 4 and Mg >2     ==================== GASTROINTESTINAL===================  S/p cholecystostomy tube placed on  with IR  Plan to re-start trickle feeds   CTA A/P : Evaluation of the mesenteric vessels is limited by streak artifact from LVAD. There appears to be severe stenosis of the proximal SMA; abdominal mesenteric doppler is recommended for further evaluation. 2.  No small bowel findings to suggest acute mesenteric ischemia. 3.  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  Mesenteric duplex on 8/15 borderline stenosis of proximal SMA, s/p failed SMA stent, L fem RP hematoma on 9/10; No intervention for cholecystostomy tube, PEJ   Reglan for gut motility  Zofran PRN nausea/vomiting    multivitamin 1 Tablet(s) Oral daily  GI prophylaxis, pantoprazole  Injectable 40 milliGRAM(s) IV Push every 12 hours  simethicone 80 milliGRAM(s) Chew every 8 hours PRN Gas  sodium chloride 0.9% lock flush 10 milliLiter(s) IV Push every 1 hour PRN Pre/post blood products, medications, blood draw, and to maintain line patency  sodium chloride 0.9%. 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; Will f/u with Endocrine re: management of hyperthyroidism in setting of elevated T3  Per endocrine      - Thyroid US when trach is out      - c/w Methimazole and hydrocortisone    dextrose 50% Injectable 25 Gram(s) IV Push once  hydrocortisone sodium succinate Injectable 100 milliGRAM(s) IV Push every 8 hours  insulin lispro (ADMELOG) corrective regimen sliding scale   SubCutaneous every 6 hours  insulin regular Infusion 2 Unit(s)/Hr (2 mL/Hr) IV Continuous <Continuous>  methimazole 10 milliGRAM(s) Oral every 8 hours    ========================INFECTIOUS DISEASE================  Afebrile, WBC downtrending 36.59->28.31  Continue trending WBC and monitoring fever curve   CT C/A/P : progressive ISABELLE opacities and L consolidations, multifocal pneumonia  Concern for aspiration pneumonia as per ID, C/w IVPB Vanco, Zosyn, and Flagyl for empiric coverage   Vanco solution for c.diff prophylaxis     fluconAZOLE IVPB 100 milliGRAM(s) IV Intermittent every 24 hours  piperacillin/tazobactam IVPB.. 3.375 Gram(s) IV Intermittent every 8 hours  vancomycin    Solution 125 milliGRAM(s) Oral every 6 hours  vancomycin  IVPB 1000 milliGRAM(s) IV Intermittent every 12 hours      Patient requires continuous monitoring with bedside rhythm monitoring, pulse ox monitoring, and intermittent blood gas analysis. Care plan discussed with ICU care team. Patient remained critical and at risk for life threatening decompensation.     By signing my name below, I, Agnieszka Cherry, attest that this documentation has been prepared under the direction and in the presence of CLAUDIA Bey   Electronically signed: Romel Shaffer, 21 @ 07:27    I, Luciano Ayala, personally performed the services described in this documentation. all medical record entries made by the romel were at my direction and in my presence. I have reviewed the chart and agree that the record reflects my personal performance and is accurate and complete  Electronically signed: CLAUDIA Bey        RICKY HAMPTON  MRN-[id number]  Patient is a 66y old  Male who presents with a chief complaint of Anemia, Supratherapeutic INR, Dark Stools (27 Sep 2021 18:57)    HPI:  65M PMH ACC/AHA stage D HF due to NICM HM2 LVAD , TV annuloplasty ring 17 as destination therapy due to severe peripheral artery disease with significant stenosis  SIADH, Depression, CKD-3 with hyperkalemia, past E. coli UTIs, driveline drainage (21) and COVID-19 (back in 2020)  He was recently seen in clinic where he complained of abdominal pain and dark stools w constipation back in May. He presents to [facility] ER today weakness and fatigue, moderate and + Black stools for three days, on coumadin secondary to warfarin use in the setting of an LVAD. Patient has required transfusions for GIB in the past. Mostly recently back in 2021 pt had anemia with dark stools. No interventions was done at that time. However Last Endoscopy was done in 2020 (negative). Today labs show patient is anemic with H/H of 4.5/16.3,. INR is 8.84 MAP in the 90s, Temp 35.1. He denies any chest pain, shortness of breath, dizziness, abd pain, nausea or vomiting.       (2021 16:57)      Surgery/Hospital Course:   admit for melena w/ anemia, INR 8.84   6/15 Capsul study (+) for small bowel bleed, balloon endoscopy (old blood in prox ileum); post EGD - septic w/ L opacity, re-intubated for concern for aspiration, TTE (Mod MR, decrease biV w/ interventricular septum boweing towards R)   bronch    +C Diff    CT C/A/P: Fluid filled colon which may be 2/2 rapid transit. Small bilateral pleffs with associates. Compressive atelectasis New ISABELLE & LLL  parenchymal opacities, suspicious for pneumonia. 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    #8 Shiley trach at bedside    LVAD speed increased to 9200   Bronch   TC since . Patient transferred to SDU.    INR today 2.64.  H&H 7.3/24 this AM.  Will repeat CBC at noon, and will send stool guaiac Patient with persistent abdominal tenderness, rate of tube feeds decreased.  No nausea/vomiting.     INR today 2.4. H&H 9.1/28.6 low flow overnight /N&V, refusing Tube feeds on D5 1/2 normal  @50 cc/hr   INR 2.69  H&H 7.7/.1 refusing Tube feeds on D5 1/2 normal  @50 cc/hr. This am + BM Melena Dr Oneill HF  aware- PRBC x1  GI team consulted -  NPO  plan on study in am-  D/w Dr Cadet Patient  to return to CTU for further management; 1PRBCS    Post op INR 2.2 today.  No bleeding. BC + for SM.  Pt is hypotensive requiring pressor and inotropic support.  ID follow up today on Cefepime will follow.   R PTC for PTX    CT C/A/P: sub q emphysema in R chest wall, GGO RUL, small ascites CTH negative; Abd US: GB thickening, pericholecystic fluid     Perchole drain in place continues to drain total output overnight 133.  Fever today 38.8    duplex LE negative    Patient with persistent abdominal pain, refusing tube feeds and medications, Psych consulted   CTA A/P ordered to r/o mesenteric ischemia 2/2 persistent anorexia, nausea, vomiting. Revealed:  Evaluation of the mesenteric vessels is limited by streak artifact from LVAD. There appears to be severe stenosis of the proximal SMA; abdominal mesenteric doppler is recommended for further evaluation. 2.  No small bowel findings to suggest acute mesenteric ischemia. 3.  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  8/15: Cultures resulted BC 1/2 +GPC in clusters, SC enterobacter; mesenteric duplex: borderline stenosis of proximal SMA  : CT C/A/P noncon: Nondular opacities in R lung apex w/cavitation, abd nl  :  Continue current care, treatment of thyrotoxicosis with medications as per endocrine, d/c ABX as per team.    RUQ sono: Contracted gallbladder with cholecystostomy tube in place.  9/10 failed SMA stent, L fem PSA, s/p 3U PRCB   CTA Abd/Pelvis L RP hematoma    Trach decannulated    intubated fro resp distress for increased WOB, LL pneumonia; CT C/A/P: progressive ISABELLE opacities and L consolidations, multifocal pneumonia     Today:  Off pressor [id number], continue to monitor hemodynamics closely. Recent VT on IV Lidocaine, wean to off as tolerated. Plan to restart trickle feeds. Will start planning for re-trach, will consult ENT, final outcome will be made pending palliative recommendations - family meeting scheduled for tomorrow. Wean sedation with IV Propofol to off, start Precedex to maintain vent synchrony. Will f/u with Endocrine re: management of hyperthyroidism in setting of elevated T3. Improving CXR and recent cultures negative, d/c all antibiotics.    REVIEW OF SYSTEMS:  Unable to obtain, pt intubated and sedated     ICU Vital Signs Last 24 Hrs  T(C): 35.8 (28 Sep 2021 04:00), Max: 36.9 (27 Sep 2021 08:00)  T(F): 96.4 (28 Sep 2021 04:00), Max: 98.4 (27 Sep 2021 08:00)  HR: 74 (28 Sep 2021 07:00) (72 - 103)  BP: --  BP(mean): --  ABP: 115/83 (28 Sep 2021 07:00) (76/67 - 115/83)  ABP(mean): 95 (28 Sep 2021 07:00) (71 - 98)  RR: 20 (28 Sep 2021 07:00) (20 - 23)  SpO2: 100% (28 Sep 2021 07:00) (98% - 100%)      Physical Exam:  Gen:  intubated   CNS:  sedated   Neck: no JVD, gauze over trach wound  RES : coarse breath sounds, no wheezing    CVS: +LVAD hum   Abd: Soft. Sybil drain with bilious fluid. Positive BS throughout. Mild RUQ abdominal tenderness by perc sybil.  Skin: No rash, erythema, cyanosis.  Vasc: Warm and well-perfused.  Ext:  no edema      ============================I/O===========================   I&O's Detail    27 Sep 2021 07:01  -  28 Sep 2021 07:00  --------------------------------------------------------  IN:    Albumin 5%  - 250 mL: 250 mL    Enteral Tube Flush: 110 mL    Insulin: 27 mL    IV PiggyBack: 300 mL    IV PiggyBack: 950 mL    Lidocaine: 91.2 mL    Propofol: 313.4 mL    sodium chloride 0.9%: 240 mL    Vasopressin: 31 mL  Total IN: 2312.6 mL    OUT:    Drain (mL): 400 mL    Indwelling Catheter - Urethral (mL): 680 mL    Miscellaneous Tube Feedin mL    Norepinephrine: 0 mL    Voided (mL): 365 mL  Total OUT: 1445 mL    Total NET: 867.6 mL        ============================ LABS =========================                        8.8    28.31 )-----------( 120      ( 28 Sep 2021 00:24 )             26.6         138  |  106  |  22  ----------------------------<  132<H>  3.6   |  20<L>  |  0.45<L>    Ca    10.7<H>      28 Sep 2021 00:24  Phos  1.8       Mg     2.0         TPro  7.5  /  Alb  4.1  /  TBili  2.1<H>  /  DBili  x   /  AST  19  /  ALT  23  /  AlkPhos  136<H>      LIVER FUNCTIONS - ( 28 Sep 2021 00:24 )  Alb: 4.1 g/dL / Pro: 7.5 g/dL / ALK PHOS: 136 U/L / ALT: 23 U/L / AST: 19 U/L / GGT: x             ABG - ( 28 Sep 2021 00:14 )  pH, Arterial: 7.46  pH, Blood: x     /  pCO2: 35    /  pO2: 183   / HCO3: 25    / Base Excess: 1.2   /  SaO2: 100.0               ======================Micro/Rad/Cardio=================  Culture: Reviewed   CXR: Reviewed  Echo:Reviewed  ======================================================  PAST MEDICAL & SURGICAL HISTORY:  CHF (congestive heart failure)    CAD (coronary artery disease)    Depression    Pleural effusion    History of  novel coronavirus disease (COVID-19)  2020    Hemorrhoids    Bleeding hemorrhoids    Peripheral arterial disease    Claudication    BPH with urinary obstruction    ACC/AHA stage D systolic heart failure    Anticoagulation goal of INR 2.0 to 2.5    Falls    Clavicle fracture    CKD (chronic kidney disease), stage III    Iron deficiency anemia    H/O epistaxis    Vertigo    GI bleed    S/P TVR (tricuspid valve repair)    S/P ventricular assist device    S/P endoscopy      ====================ASSESSMENT ==============  Stage D Nonischemic Cardiomyopathy, Status Post HM2 on 2017    Cardiogenic shock  Hemodynamic instability   Acute hypoxemic respiratory failure s/p trach , decannulated on ; Reintubated on    GI bleed , Status Post Enteroscopy   Anemia, in setting of melena   Chronic Kidney Disease  Stress hyperglycemia   C.diff positive on    Hypovolemic shock  Septic shock  Leukocytosis  GB thickening/percholecystic s/p perc choley by IT   SMA stenosis  Serratia/citrobacter pneumonia   Stenotrophomonas pneumonia   Enterobacter pneumonia   Nasuea/vomiting  Deconditioning  Hyponatremia      Plan:  ====================== NEUROLOGY=====================  Sedated with IV Propofol to off, wean as tolerated and start Precedex to maintain vent synchrony  Deconditioned, PT as tolerated  Tylenol PRN for analgesia     acetaminophen    Suspension .. 650 milliGRAM(s) Enteral Tube every 6 hours PRN Mild Pain (1 - 3)  propofol Infusion 13.528 MICROgram(s)/kG/Min (5 mL/Hr) IV Continuous <Continuous>    ==================== RESPIRATORY======================  Acute hypoxemic respiratory failure s/p #8 shiley trach on ; decannulated on ; Reintubated on    Continue close monitoring of respiratory rate and breathing pattern, following of ABG’s with A-line monitoring, continuous pulse oximetry monitoring.   Will start planning for re-trach, will re-consult ENT, final decision will be made pending palliative recommendations - family meeting scheduled on      Mechanical Ventilation:  Mode: AC/ CMV (Assist Control/ Continuous Mandatory Ventilation)  RR (machine): 20  TV (machine): 500  FiO2: 40  PEEP: 5  ITime: 1  MAP: 12  PIP: 22      ====================CARDIOVASCULAR==================  Stage D Nonischemic Cardiomyopathy, Status Post HM2 on 2017; LVAD settings 9200, flow 3.9  TTE : EF 12%, mild-mod AR, mild-mod TR, severe global LV systolic dysfxn, RV enlargement with decreased fxn  Recent VT on IV Lidocaine, wean to off as tolerated today  Off pressor [id number], continue to monitor hemodynamics closely.   Rate control with Lopressor     lidocaine   Infusion 0.5 mG/Min (3.75 mL/Hr) IV Continuous <Continuous>  metoprolol tartrate Injectable 2.5 milliGRAM(s) IV Push every 6 hours    ===================HEMATOLOGIC/ONC ===================  CTA A/P on  +L RP hematoma   Acute blood loss anemia, monitor H&H/Plts    Holding coumadin and ASA given recurrent GI bleeding, continue monitoring LDH     ===================== RENAL =========================  Continue monitoring urine output, I&OS, BUN/Cr   Euvolemic, even fluid balance, currently off diuretics.    Replete lytes PRN. Keep K> 4 and Mg >2     ==================== GASTROINTESTINAL===================  S/p cholecystostomy tube placed on  with IR  Plan to re-start trickle feeds   CTA A/P : Evaluation of the mesenteric vessels is limited by streak artifact from LVAD. There appears to be severe stenosis of the proximal SMA; abdominal mesenteric doppler is recommended for further evaluation. 2.  No small bowel findings to suggest acute mesenteric ischemia. 3.  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  Mesenteric duplex on 8/15 borderline stenosis of proximal SMA, s/p failed SMA stent, L fem RP hematoma on 9/10; No intervention for cholecystostomy tube, PEJ  Reglan for gut motility  Zofran PRN nausea/vomiting    multivitamin 1 Tablet(s) Oral daily  GI prophylaxis, pantoprazole  Injectable 40 milliGRAM(s) IV Push every 12 hours  simethicone 80 milliGRAM(s) Chew every 8 hours PRN Gas  sodium chloride 0.9% lock flush 10 milliLiter(s) IV Push every 1 hour PRN Pre/post blood products, medications, blood draw, and to maintain line patency  sodium chloride 0.9%. 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; Will f/u with Endocrine re: management of hyperthyroidism in setting of elevated T3  Per endocrine      - Thyroid US when trach is out      - c/w Methimazole and hydrocortisone    dextrose 50% Injectable 25 Gram(s) IV Push once  hydrocortisone sodium succinate Injectable 100 milliGRAM(s) IV Push every 8 hours  insulin lispro (ADMELOG) corrective regimen sliding scale   SubCutaneous every 6 hours  insulin regular Infusion 2 Unit(s)/Hr (2 mL/Hr) IV Continuous <Continuous>  methimazole 10 milliGRAM(s) Oral every 8 hours    ========================INFECTIOUS DISEASE================  Afebrile, WBC downtrending 36.59->28.31  Continue trending WBC and monitoring fever curve   CT C/A/P : progressive ISABELLE opacities and L consolidations, multifocal pneumonia  Vanco solution for c.diff prophylaxis  Improving CXR and recent culture negative, d/c all antibiotics     fluconAZOLE IVPB 100 milliGRAM(s) IV Intermittent every 24 hours  piperacillin/tazobactam IVPB.. 3.375 Gram(s) IV Intermittent every 8 hours  vancomycin    Solution 125 milliGRAM(s) Oral every 6 hours  vancomycin  IVPB 1000 milliGRAM(s) IV Intermittent every 12 hours      Patient requires continuous monitoring with bedside rhythm monitoring, pulse ox monitoring, and intermittent blood gas analysis. Care plan discussed with ICU care team. Patient remained critical and at risk for life threatening decompensation.     By signing my name below, I, Agnieszka Cherry, attest that this documentation has been prepared under the direction and in the presence of CLAUDIA Bey   Electronically signed: Romel Shaffer, 21 @ 07:27    I, Luciano Ayala, personally performed the services described in this documentation. all medical record entries made by the romel were at my direction and in my presence. I have reviewed the chart and agree that the record reflects my personal performance and is accurate and complete  Electronically signed: CLAUDIA Bey        RICKY HAMPTON  MRN-[id number]  Patient is a 66y old  Male who presents with a chief complaint of Anemia, Supratherapeutic INR, Dark Stools (27 Sep 2021 18:57)    HPI:  65M PMH ACC/AHA stage D HF due to NICM HM2 LVAD , TV annuloplasty ring 17 as destination therapy due to severe peripheral artery disease with significant stenosis  SIADH, Depression, CKD-3 with hyperkalemia, past E. coli UTIs, driveline drainage (21) and COVID-19 (back in 2020)  He was recently seen in clinic where he complained of abdominal pain and dark stools w constipation back in May. He presents to [facility] ER today weakness and fatigue, moderate and + Black stools for three days, on coumadin secondary to warfarin use in the setting of an LVAD. Patient has required transfusions for GIB in the past. Mostly recently back in 2021 pt had anemia with dark stools. No interventions was done at that time. However Last Endoscopy was done in 2020 (negative). Today labs show patient is anemic with H/H of 4.5/16.3,. INR is 8.84 MAP in the 90s, Temp 35.1. He denies any chest pain, shortness of breath, dizziness, abd pain, nausea or vomiting.       (2021 16:57)      Surgery/Hospital Course:   admit for melena w/ anemia, INR 8.84   6/15 Capsul study (+) for small bowel bleed, balloon endoscopy (old blood in prox ileum); post EGD - septic w/ L opacity, re-intubated for concern for aspiration, TTE (Mod MR, decrease biV w/ interventricular septum boweing towards R)   bronch    +C Diff    CT C/A/P: Fluid filled colon which may be 2/2 rapid transit. Small bilateral pleffs with associates. Compressive atelectasis New ISABELLE & LLL  parenchymal opacities, suspicious for pneumonia. 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    #8 Shiley trach at bedside    LVAD speed increased to 9200   Bronch   TC since . Patient transferred to SDU.    INR today 2.64.  H&H 7.3/24 this AM.  Will repeat CBC at noon, and will send stool guaiac Patient with persistent abdominal tenderness, rate of tube feeds decreased.  No nausea/vomiting.     INR today 2.4. H&H 9.1/28.6 low flow overnight /N&V, refusing Tube feeds on D5 1/2 normal  @50 cc/hr   INR 2.69  H&H 7.7/.1 refusing Tube feeds on D5 1/2 normal  @50 cc/hr. This am + BM Melena Dr Oneill HF  aware- PRBC x1  GI team consulted -  NPO  plan on study in am-  D/w Dr Cadet Patient  to return to CTU for further management; 1PRBCS    Post op INR 2.2 today.  No bleeding. BC + for SM.  Pt is hypotensive requiring pressor and inotropic support.  ID follow up today on Cefepime will follow.   R PTC for PTX    CT C/A/P: sub q emphysema in R chest wall, GGO RUL, small ascites CTH negative; Abd US: GB thickening, pericholecystic fluid     Perchole drain in place continues to drain total output overnight 133.  Fever today 38.8    duplex LE negative    Patient with persistent abdominal pain, refusing tube feeds and medications, Psych consulted   CTA A/P ordered to r/o mesenteric ischemia 2/2 persistent anorexia, nausea, vomiting. Revealed:  Evaluation of the mesenteric vessels is limited by streak artifact from LVAD. There appears to be severe stenosis of the proximal SMA; abdominal mesenteric doppler is recommended for further evaluation. 2.  No small bowel findings to suggest acute mesenteric ischemia. 3.  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  8/15: Cultures resulted BC 1/2 +GPC in clusters, SC enterobacter; mesenteric duplex: borderline stenosis of proximal SMA  : CT C/A/P noncon: Nondular opacities in R lung apex w/cavitation, abd nl  :  Continue current care, treatment of thyrotoxicosis with medications as per endocrine, d/c ABX as per team.    RUQ sono: Contracted gallbladder with cholecystostomy tube in place.  9/10 failed SMA stent, L fem PSA, s/p 3U PRCB   CTA Abd/Pelvis L RP hematoma    Trach decannulated    intubated fro resp distress for increased WOB, LL pneumonia; CT C/A/P: progressive ISABELLE opacities and L consolidations, multifocal pneumonia     Today:  Off pressor [id number], continue to monitor hemodynamics closely. Recent VT on IV Lidocaine, wean to off as tolerated. Plan to restart trickle feeds. Will start planning for re-trach, will consult ENT, final outcome will be made pending palliative recommendations - family meeting scheduled for tomorrow. Wean sedation with IV Propofol to off, start Precedex to maintain vent synchrony. Will f/u with Endocrine re: management of hyperthyroidism in setting of elevated T3. Improving CXR and recent cultures negative, d/c all antibiotics. Plan to titrate stress dose steroids.     REVIEW OF SYSTEMS:  Unable to obtain, pt intubated and sedated     ICU Vital Signs Last 24 Hrs  T(C): 35.8 (28 Sep 2021 04:00), Max: 36.9 (27 Sep 2021 08:00)  T(F): 96.4 (28 Sep 2021 04:00), Max: 98.4 (27 Sep 2021 08:00)  HR: 74 (28 Sep 2021 07:00) (72 - 103)  BP: --  BP(mean): --  ABP: 115/83 (28 Sep 2021 07:00) (76/67 - 115/83)  ABP(mean): 95 (28 Sep 2021 07:00) (71 - 98)  RR: 20 (28 Sep 2021 07:00) (20 - 23)  SpO2: 100% (28 Sep 2021 07:00) (98% - 100%)      Physical Exam:  Gen:  intubated   CNS:  sedated   Neck: no JVD, gauze over trach wound  RES : coarse breath sounds, no wheezing    CVS: +LVAD hum   Abd: Soft. Sybil drain with bilious fluid. Positive BS throughout. Mild RUQ abdominal tenderness by perc sybil.  Skin: No rash, erythema, cyanosis.  Vasc: Warm and well-perfused.  Ext:  no edema      ============================I/O===========================   I&O's Detail    27 Sep 2021 07:01  -  28 Sep 2021 07:00  --------------------------------------------------------  IN:    Albumin 5%  - 250 mL: 250 mL    Enteral Tube Flush: 110 mL    Insulin: 27 mL    IV PiggyBack: 300 mL    IV PiggyBack: 950 mL    Lidocaine: 91.2 mL    Propofol: 313.4 mL    sodium chloride 0.9%: 240 mL    Vasopressin: 31 mL  Total IN: 2312.6 mL    OUT:    Drain (mL): 400 mL    Indwelling Catheter - Urethral (mL): 680 mL    Miscellaneous Tube Feedin mL    Norepinephrine: 0 mL    Voided (mL): 365 mL  Total OUT: 1445 mL    Total NET: 867.6 mL        ============================ LABS =========================                        8.8    28.31 )-----------( 120      ( 28 Sep 2021 00:24 )             26.6         138  |  106  |  22  ----------------------------<  132<H>  3.6   |  20<L>  |  0.45<L>    Ca    10.7<H>      28 Sep 2021 00:24  Phos  1.8       Mg     2.0         TPro  7.5  /  Alb  4.1  /  TBili  2.1<H>  /  DBili  x   /  AST  19  /  ALT  23  /  AlkPhos  136<H>      LIVER FUNCTIONS - ( 28 Sep 2021 00:24 )  Alb: 4.1 g/dL / Pro: 7.5 g/dL / ALK PHOS: 136 U/L / ALT: 23 U/L / AST: 19 U/L / GGT: x             ABG - ( 28 Sep 2021 00:14 )  pH, Arterial: 7.46  pH, Blood: x     /  pCO2: 35    /  pO2: 183   / HCO3: 25    / Base Excess: 1.2   /  SaO2: 100.0               ======================Micro/Rad/Cardio=================  Culture: Reviewed   CXR: Reviewed  Echo:Reviewed  ======================================================  PAST MEDICAL & SURGICAL HISTORY:  CHF (congestive heart failure)    CAD (coronary artery disease)    Depression    Pleural effusion    History of  novel coronavirus disease (COVID-19)  2020    Hemorrhoids    Bleeding hemorrhoids    Peripheral arterial disease    Claudication    BPH with urinary obstruction    ACC/AHA stage D systolic heart failure    Anticoagulation goal of INR 2.0 to 2.5    Falls    Clavicle fracture    CKD (chronic kidney disease), stage III    Iron deficiency anemia    H/O epistaxis    Vertigo    GI bleed    S/P TVR (tricuspid valve repair)    S/P ventricular assist device    S/P endoscopy      ====================ASSESSMENT ==============  Stage D Nonischemic Cardiomyopathy, Status Post HM2 on 2017    Cardiogenic shock  Hemodynamic instability   Acute hypoxemic respiratory failure s/p trach , decannulated on ; Reintubated on    GI bleed , Status Post Enteroscopy   Anemia, in setting of melena   Chronic Kidney Disease  Stress hyperglycemia   C.diff positive on    Hypovolemic shock  Septic shock  Leukocytosis  GB thickening/percholecystic s/p perc choley by IT   SMA stenosis  Serratia/citrobacter pneumonia   Stenotrophomonas pneumonia   Enterobacter pneumonia   Nasuea/vomiting  Deconditioning  Hyponatremia      Plan:  ====================== NEUROLOGY=====================  Sedated with IV Propofol to off, wean as tolerated and start Precedex to maintain vent synchrony  Deconditioned, PT as tolerated  Tylenol PRN for analgesia     acetaminophen    Suspension .. 650 milliGRAM(s) Enteral Tube every 6 hours PRN Mild Pain (1 - 3)  propofol Infusion 13.528 MICROgram(s)/kG/Min (5 mL/Hr) IV Continuous <Continuous>    ==================== RESPIRATORY======================  Acute hypoxemic respiratory failure s/p #8 shiley trach on ; decannulated on ; Reintubated on    Continue close monitoring of respiratory rate and breathing pattern, following of ABG’s with A-line monitoring, continuous pulse oximetry monitoring.   Will start planning for re-trach, will re-consult ENT, final decision will be made pending palliative recommendations - family meeting scheduled on      Mechanical Ventilation:  Mode: AC/ CMV (Assist Control/ Continuous Mandatory Ventilation)  RR (machine): 20  TV (machine): 500  FiO2: 40  PEEP: 5  ITime: 1  MAP: 12  PIP: 22      ====================CARDIOVASCULAR==================  Stage D Nonischemic Cardiomyopathy, Status Post HM2 on 2017; LVAD settings 9200, flow 3.9  TTE : EF 12%, mild-mod AR, mild-mod TR, severe global LV systolic dysfxn, RV enlargement with decreased fxn  Recent VT on IV Lidocaine, wean to off as tolerated today  Off pressor [id number], continue to monitor hemodynamics closely.   Rate control with Lopressor     lidocaine   Infusion 0.5 mG/Min (3.75 mL/Hr) IV Continuous <Continuous>  metoprolol tartrate Injectable 2.5 milliGRAM(s) IV Push every 6 hours    ===================HEMATOLOGIC/ONC ===================  CTA A/P on  +L RP hematoma   Acute blood loss anemia, monitor H&H/Plts    Holding coumadin and ASA given recurrent GI bleeding, continue monitoring LDH     ===================== RENAL =========================  Continue monitoring urine output, I&OS, BUN/Cr   Euvolemic, even fluid balance, currently off diuretics.    Replete lytes PRN. Keep K> 4 and Mg >2     ==================== GASTROINTESTINAL===================  S/p cholecystostomy tube placed on  with IR  Plan to re-start trickle feeds   CTA A/P : Evaluation of the mesenteric vessels is limited by streak artifact from LVAD. There appears to be severe stenosis of the proximal SMA; abdominal mesenteric doppler is recommended for further evaluation. 2.  No small bowel findings to suggest acute mesenteric ischemia. 3.  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  Mesenteric duplex on 8/15 borderline stenosis of proximal SMA, s/p failed SMA stent, L fem RP hematoma on 9/10; No intervention for cholecystostomy tube, PEJ  Reglan for gut motility  Zofran PRN nausea/vomiting    multivitamin 1 Tablet(s) Oral daily  GI prophylaxis, pantoprazole  Injectable 40 milliGRAM(s) IV Push every 12 hours  simethicone 80 milliGRAM(s) Chew every 8 hours PRN Gas  sodium chloride 0.9% lock flush 10 milliLiter(s) IV Push every 1 hour PRN Pre/post blood products, medications, blood draw, and to maintain line patency  sodium chloride 0.9%. 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; Will f/u with Endocrine re: management of hyperthyroidism in setting of elevated T3  Per endocrine      - Thyroid US when trach is out      - c/w Methimazole       - Titrate stress dose steroids     dextrose 50% Injectable 25 Gram(s) IV Push once  hydrocortisone sodium succinate Injectable 100 milliGRAM(s) IV Push every 8 hours  insulin lispro (ADMELOG) corrective regimen sliding scale   SubCutaneous every 6 hours  insulin regular Infusion 2 Unit(s)/Hr (2 mL/Hr) IV Continuous <Continuous>  methimazole 10 milliGRAM(s) Oral every 8 hours    ========================INFECTIOUS DISEASE================  Afebrile, WBC downtrending 36.59->28.31  Continue trending WBC and monitoring fever curve   CT C/A/P : progressive ISABELLE opacities and L consolidations, multifocal pneumonia  Vanco solution for c.diff prophylaxis  Improving CXR and recent culture negative, d/c all antibiotics     fluconAZOLE IVPB 100 milliGRAM(s) IV Intermittent every 24 hours  piperacillin/tazobactam IVPB.. 3.375 Gram(s) IV Intermittent every 8 hours  vancomycin    Solution 125 milliGRAM(s) Oral every 6 hours  vancomycin  IVPB 1000 milliGRAM(s) IV Intermittent every 12 hours      Patient requires continuous monitoring with bedside rhythm monitoring, pulse ox monitoring, and intermittent blood gas analysis. Care plan discussed with ICU care team. Patient remained critical and at risk for life threatening decompensation.     By signing my name below, IAgnieszka, attest that this documentation has been prepared under the direction and in the presence of CLAUDIA Bey   Electronically signed: Romel Shaffer, 21 @ 07:27    I, Luciano Ayala, personally performed the services described in this documentation. all medical record entries made by the romel were at my direction and in my presence. I have reviewed the chart and agree that the record reflects my personal performance and is accurate and complete  Electronically signed: CLAUDIA Bey

## 2021-09-28 NOTE — PROGRESS NOTE ADULT - PROBLEM SELECTOR PLAN 4
June 23, 2021    Gael Le  [address]  [facility] [address]    Dear Gael,       We recently received a refill request for escitalopram (LEXAPRO) 10 MG tablet.  We have refilled this for a one time 30 day supply only because you are due for a:    Anxiety office visit      Please call at your earliest convenience so that there will not be a delay with your future refills.          Thank you,   Your [facility] Team/sp  [phone]                 - ID following and appreciate recommendations   - serratia bacteremia and poss acalculous cholecystitis. B/c with gram + cocci and many enterobacter Carbapenem resistant strain and now s/p per dawn drain  - also with enterobacter from sputum culture 8/13 and serratia marcescens from sputum culture 9/14  - Blood Cultures negative  - Leukocytosis remained elevated, currently 36.59  - CT Scan 9/26 showed multifocal PNA  - remains on Zosyn IV, Vanco IV, and Fluconazole IV  - continue PO Vano for C. Diff ppx   - new cultures pending - ID following and appreciate recommendations   - serratia bacteremia and poss acalculous cholecystitis. B/c with gram + cocci and many enterobacter Carbapenem resistant strain and now s/p per dawn drain  - enterobacter from sputum culture 8/13 and serratia marcescens from sputum culture 9/14  - Blood Cultures negative  - Leukocytosis remained elevated, currently 28.31  - CT Scan 9/26 showed multifocal PNA  - remains on Zosyn IV, Vanco IV, and Fluconazole IV  - continue PO Vano for C. Diff ppx   - was recultured on 9/27, all cultures NGTD at this time. Please continue to monitor

## 2021-09-28 NOTE — PROGRESS NOTE ADULT - SUBJECTIVE AND OBJECTIVE BOX
INFECTIOUS DISEASES FOLLOW UP-- Anitra Prater  [phone]    This is a follow up note for this  66yMale with  Anemia    Acute cholecystitis        ROS:  CONSTITUTIONAL:  No fever, good appetite  CARDIOVASCULAR:  No chest pain or palpitations  RESPIRATORY:  No dyspnea  GASTROINTESTINAL:  No nausea, vomiting, diarrhea, or abdominal pain  GENITOURINARY:  No dysuria  NEUROLOGIC:  No headache,     Allergies    Amiodarone Hydrochloride (Other (Severe))    Intolerances        ANTIBIOTICS/RELEVANT:  antimicrobials    immunologic:    OTHER:  acetaminophen    Suspension .. 650 milliGRAM(s) Enteral Tube every 6 hours PRN  chlorhexidine 0.12% Liquid 15 milliLiter(s) Oral Mucosa two times a day  chlorhexidine 2% Cloths 1 Application(s) Topical <User Schedule>  dexMEDEtomidine Infusion 1.5 MICROgram(s)/kG/Hr IV Continuous <Continuous>  dextrose 50% Injectable 25 Gram(s) IV Push once  hydrALAZINE 25 milliGRAM(s) Oral every 8 hours  hydrocortisone sodium succinate Injectable 50 milliGRAM(s) IV Push every 8 hours  insulin lispro (ADMELOG) corrective regimen sliding scale   SubCutaneous every 6 hours  insulin regular Infusion 2 Unit(s)/Hr IV Continuous <Continuous>  lidocaine   4% Patch 1 Patch Transdermal daily  methimazole 20 milliGRAM(s) Oral every 8 hours  metoclopramide Injectable 10 milliGRAM(s) IV Push every 8 hours  multivitamin 1 Tablet(s) Oral daily  ondansetron Injectable 4 milliGRAM(s) IV Push every 6 hours PRN  pantoprazole  Injectable 40 milliGRAM(s) IV Push every 12 hours  propofol Infusion 13.528 MICROgram(s)/kG/Min IV Continuous <Continuous>  propranolol 40 milliGRAM(s) Oral every 8 hours  simethicone 80 milliGRAM(s) Chew every 8 hours PRN  sodium chloride 0.9% lock flush 10 milliLiter(s) IV Push every 1 hour PRN  sodium chloride 0.9%. 1000 milliLiter(s) IV Continuous <Continuous>  thiamine 100 milliGRAM(s) Oral daily  vasopressin Infusion 0.1 Unit(s)/Min IV Continuous <Continuous>      Objective:  Vital Signs Last 24 Hrs  T(C): 35.6 (28 Sep 2021 15:00), Max: 36.3 (27 Sep 2021 20:00)  T(F): 96.1 (28 Sep 2021 15:00), Max: 97.3 (27 Sep 2021 20:00)  HR: 83 (28 Sep 2021 19:00) (72 - 92)  BP: --  BP(mean): --  RR: 20 (28 Sep 2021 19:00) (20 - 39)  SpO2: 100% (28 Sep 2021 19:00) (98% - 100%)    PHYSICAL EXAM:  Constitutional:no acute distress  Eyes:ALONSO, EOMI  Ear/Nose/Throat: no oral lesions, 	  Respiratory: clear BL  Cardiovascular: S1S2  Gastrointestinal:soft, (+) BS, no tenderness  Extremities:no e/e/c  No Lymphadenopathy  IV sites not inflammed.    LABS:                        8.8    28.31 )-----------( 120      ( 28 Sep 2021 00:24 )             26.6     09-28    138  |  106  |  22  ----------------------------<  132<H>  3.6   |  20<L>  |  0.45<L>    Ca    10.7<H>      28 Sep 2021 00:24  Phos  1.8     09-28  Mg     2.0     09-28    TPro  7.5  /  Alb  4.1  /  TBili  2.1<H>  /  DBili  x   /  AST  19  /  ALT  23  /  AlkPhos  136<H>  09-28          MICROBIOLOGY:            RECENT CULTURES:  09-27 @ 14:26  .Sputum Sputum  --  --  --  --  --  09-26 @ 03:25  .Blood Blood-Peripheral  --  --  --    No growth to date.  --      RADIOLOGY & ADDITIONAL STUDIES: INFECTIOUS DISEASES FOLLOW UP-- Anitra Moi  [phone]    This is a follow up note for this  66yMale with  Anemia  cachectic            ROS:  CONSTITUTIONAL: frail and sad appearing    Allergies    Amiodarone Hydrochloride (Other (Severe))    Intolerances        ANTIBIOTICS/RELEVANT:  antimicrobials    immunologic:    OTHER:  acetaminophen    Suspension .. 650 milliGRAM(s) Enteral Tube every 6 hours PRN  chlorhexidine 0.12% Liquid 15 milliLiter(s) Oral Mucosa two times a day  chlorhexidine 2% Cloths 1 Application(s) Topical <User Schedule>  dexMEDEtomidine Infusion 1.5 MICROgram(s)/kG/Hr IV Continuous <Continuous>  dextrose 50% Injectable 25 Gram(s) IV Push once  hydrALAZINE 25 milliGRAM(s) Oral every 8 hours  hydrocortisone sodium succinate Injectable 50 milliGRAM(s) IV Push every 8 hours  insulin lispro (ADMELOG) corrective regimen sliding scale   SubCutaneous every 6 hours  insulin regular Infusion 2 Unit(s)/Hr IV Continuous <Continuous>  lidocaine   4% Patch 1 Patch Transdermal daily  methimazole 20 milliGRAM(s) Oral every 8 hours  metoclopramide Injectable 10 milliGRAM(s) IV Push every 8 hours  multivitamin 1 Tablet(s) Oral daily  ondansetron Injectable 4 milliGRAM(s) IV Push every 6 hours PRN  pantoprazole  Injectable 40 milliGRAM(s) IV Push every 12 hours  propofol Infusion 13.528 MICROgram(s)/kG/Min IV Continuous <Continuous>  propranolol 40 milliGRAM(s) Oral every 8 hours  simethicone 80 milliGRAM(s) Chew every 8 hours PRN  sodium chloride 0.9% lock flush 10 milliLiter(s) IV Push every 1 hour PRN  sodium chloride 0.9%. 1000 milliLiter(s) IV Continuous <Continuous>  thiamine 100 milliGRAM(s) Oral daily  vasopressin Infusion 0.1 Unit(s)/Min IV Continuous <Continuous>      Objective:  Vital Signs Last 24 Hrs  T(C): 35.6 (28 Sep 2021 15:00), Max: 36.3 (27 Sep 2021 20:00)  T(F): 96.1 (28 Sep 2021 15:00), Max: 97.3 (27 Sep 2021 20:00)  HR: 83 (28 Sep 2021 19:00) (72 - 92)  BP: --  BP(mean): --  RR: 20 (28 Sep 2021 19:00) (20 - 39)  SpO2: 100% (28 Sep 2021 19:00) (98% - 100%)    PHYSICAL EXAM:  Constitutional:no acute distress  Eyes:ALONSO, EOMI  Ear/Nose/Throat: no oral lesions, ET tube in place	  Respiratory: audible bilat  Cardiovascular: VAD sounds  Gastrointestinal: VAD dressing and cholecystotomy tube  Extremities:no e/e/c  No Lymphadenopathy  IV sites not inflammed.    LABS:                        8.8    28.31 )-----------( 120      ( 28 Sep 2021 00:24 )             26.6     09-28    138  |  106  |  22  ----------------------------<  132<H>  3.6   |  20<L>  |  0.45<L>    Ca    10.7<H>      28 Sep 2021 00:24  Phos  1.8     09-28  Mg     2.0     09-28    TPro  7.5  /  Alb  4.1  /  TBili  2.1<H>  /  DBili  x   /  AST  19  /  ALT  23  /  AlkPhos  136<H>  09-28          MICROBIOLOGY:            RECENT CULTURES:  09-27 @ 14:26  .Sputum Sputum  enterobacter cloacae  sensitivities pending  --  --  --  --  --  09-26 @ 03:25  .Blood Blood-Peripheral  --  --  --    No growth to date.  --      RADIOLOGY & ADDITIONAL STUDIES:    < from: Xray Chest 1 View- PORTABLE-Urgent (Xray Chest 1 View- PORTABLE-Urgent .) (09.28.21 @ 09:57) >  IMPRESSION:  Endotracheal tube tip is less than a centimeter above the bud. Recommend repositioning the endotracheal tube. Discussed with CLAUDIA Wolf with read back at 10:36 AM on September 28, 2021 by Dr. Macias.    Two enteric tubes extendbelow the diaphragm. Tips not seen.    Continued patchy left lower lung opacities which could be due to subsegmental atelectasis or pneumonia in the appropriate clinical context.    < end of copied text >

## 2021-09-28 NOTE — PROGRESS NOTE ADULT - PROBLEM SELECTOR PLAN 3
- Chronic in nature with questionable SMA stenosis on imaging and mesenteric duplex difficult to interpret with continuous flow. Underwent angiogram on 9/11 which showed SMA stenosis. Unable to place stent. Would hold off on repeating angiogram at this time.   - Feeds currently on hold in the setting of vomiting  - Discussed cholecystostomy with general surgery, unlikely to derive symptomatic benefit since perc dawn tube continues to drain. Repeat RUQ shows contracted gallbladder. Per general surgery holding off on removal of drain at this time. - Chronic in nature with questionable SMA stenosis on imaging and mesenteric duplex difficult to interpret with continuous flow. Underwent angiogram on 9/11 which showed SMA stenosis. Unable to place stent. Would hold off on repeating angiogram at this time.   - Please resume tube feeds today, will up-titrate as tolerated   - Discussed cholecystostomy with general surgery, unlikely to derive symptomatic benefit since perc dawn tube continues to drain. Repeat RUQ shows contracted gallbladder. Per general surgery holding off on removal of drain at this time.

## 2021-09-28 NOTE — PROGRESS NOTE ADULT - ASSESSMENT
67 YO M with a history of stage D NICM s/p HM2 on 9/2017 as DT (due to severe PAD) with TV ring, prior COVID-19 infection 4/2020, recurrent syncope post LVAD s/p ILR, and chronic abdominal pain with prior negative workup who was admitted 6/14/21 with symptomatic anemia with Hgb 4.5 in setting of INR 8.8 without hemodynamic instability and has since had a protracted hospitalization. He was transfused and underwent VCE which showed active bleeding in the mid small bowel but subsequent enteroscopy 6/15 did not reveal any active bleeding. He acutely decompensated after procedure with fever/hypertension, low flow alarms, and pulmonary infiltrate with hypoxia requiring intubation from probable aspiration PNA. He was unable to extubated and has since undergone tracheostomy but tolerating persistent trach collar and nearing ability for decannulation. 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 He underwent angiogram on 9/10, stent was unable to be placed and course was then complicated by RP bleed. He continued to have persistent leukocytosis and febrile episodes and was noted to have positive sputum culture for serratia marcescens and completed his course of abx. He was initially decannulated on 9/23.    Recently he started having multiples of melena, emesis and went into acte respiratory distress. He was also noted to be in NSVT, required re-intubated and required fluid resuscitation. He remains on pressors, which will continue to wean as tolerated. His overall prognosis is poor. Will require palliative care for further discussion of GOC.  65 YO M with a history of stage D NICM s/p HM2 on 9/2017 as DT (due to severe PAD) with TV ring, prior COVID-19 infection 4/2020, recurrent syncope post LVAD s/p ILR, and chronic abdominal pain with prior negative workup who was admitted 6/14/21 with symptomatic anemia with Hgb 4.5 in setting of INR 8.8 without hemodynamic instability and has since had a protracted hospitalization. He was transfused and underwent VCE which showed active bleeding in the mid small bowel but subsequent enteroscopy 6/15 did not reveal any active bleeding. He acutely decompensated after procedure with fever/hypertension, low flow alarms, and pulmonary infiltrate with hypoxia requiring intubation from probable aspiration PNA. He was unable to extubated and has since undergone tracheostomy but tolerating persistent trach collar and nearing ability for decannulation. 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 He underwent angiogram on 9/10, stent was unable to be placed and course was then complicated by RP bleed. He continued to have persistent leukocytosis and febrile episodes and was noted to have positive sputum culture for serratia marcescens and completed his course of abx. He was initially decannulated on 9/23.    Recently he started having multiples of melena, emesis and went into acte respiratory distress. He was also noted to be in NSVT, required re-intubated and required fluid resuscitation. He remains on pressors, which will continue to wean as tolerated. His overall prognosis is poor. Palliative care following, family meeting schedule for tomorrow afternoon.

## 2021-09-28 NOTE — PROGRESS NOTE ADULT - SUBJECTIVE AND OBJECTIVE BOX
Admitted for:    Following for:    Subjective:       MEDICATIONS  (STANDING):  chlorhexidine 0.12% Liquid 15 milliLiter(s) Oral Mucosa two times a day  chlorhexidine 2% Cloths 1 Application(s) Topical <User Schedule>  dexMEDEtomidine Infusion 1.5 MICROgram(s)/kG/Hr (23.1 mL/Hr) IV Continuous <Continuous>  dextrose 50% Injectable 25 Gram(s) IV Push once  hydrALAZINE 10 milliGRAM(s) Oral three times a day  hydrALAZINE Injectable 10 milliGRAM(s) IV Push once  hydrocortisone sodium succinate Injectable 50 milliGRAM(s) IV Push every 8 hours  insulin lispro (ADMELOG) corrective regimen sliding scale   SubCutaneous every 6 hours  insulin regular Infusion 2 Unit(s)/Hr (2 mL/Hr) IV Continuous <Continuous>  lidocaine   4% Patch 1 Patch Transdermal daily  methimazole 10 milliGRAM(s) Oral every 8 hours  metoclopramide Injectable 10 milliGRAM(s) IV Push every 8 hours  multivitamin 1 Tablet(s) Oral daily  pantoprazole  Injectable 40 milliGRAM(s) IV Push every 12 hours  propofol Infusion 13.528 MICROgram(s)/kG/Min (5 mL/Hr) IV Continuous <Continuous>  propranolol 40 milliGRAM(s) Oral every 8 hours  sodium chloride 0.9%. 1000 milliLiter(s) (10 mL/Hr) IV Continuous <Continuous>  thiamine 100 milliGRAM(s) Oral daily  vasopressin Infusion 0.1 Unit(s)/Min (6 mL/Hr) IV Continuous <Continuous>    MEDICATIONS  (PRN):  acetaminophen    Suspension .. 650 milliGRAM(s) Enteral Tube every 6 hours PRN Mild Pain (1 - 3)  ondansetron Injectable 4 milliGRAM(s) IV Push every 6 hours PRN Nausea and/or Vomiting  simethicone 80 milliGRAM(s) Chew every 8 hours PRN Gas  sodium chloride 0.9% lock flush 10 milliLiter(s) IV Push every 1 hour PRN Pre/post blood products, medications, blood draw, and to maintain line patency      Allergies    Amiodarone Hydrochloride (Other (Severe))    Intolerances        Review of Systems:  Constitutional: No fever  Eyes: No blurry vision  Neuro: No tremors  HEENT: No pain  Cardiovascular: No chest pain, palpitations  Respiratory: No SOB, no cough  GI: No nausea, vomiting, abdominal pain  : No dysuria  Skin: no rash  Psych: no depression  Endocrine: no polyuria, polydipsia  Hem/lymph: no swelling  Osteoporosis: no fractures    PHYSICAL EXAM:  VITALS: T(C): 35.6 (09-28-21 @ 08:00)  T(F): 96.1 (09-28-21 @ 08:00), Max: 97.3 (09-27-21 @ 16:00)  HR: 76 (09-28-21 @ 13:00) (72 - 93)  BP: --  RR:  (20 - 39)  SpO2:  (99% - 100%)  Wt(kg): --  GENERAL: NAD  EYES: No proptosis, no lid lag, anicteric  HEENT:  Atraumatic  THYROID: Normal size, no palpable nodules  RESPIRATORY: Clear to auscultation bilaterally  CARDIOVASCULAR: Regular rate and rhythm; No murmurs; no peripheral edema  GI: Soft, nontender, non distended, normal bowel sounds  SKIN: Dry  PSYCH: Alert and oriented x 3, flat affect      A1C with Estimated Average Glucose Result: 5.4 % (08-15-21 @ 13:52)  A1C with Estimated Average Glucose Result: 5.6 % (06-15-21 @ 02:42)      POCT Blood Glucose.: 126 mg/dL (09-28-21 @ 11:56)  POCT Blood Glucose.: 117 mg/dL (09-28-21 @ 10:44)  POCT Blood Glucose.: 120 mg/dL (09-28-21 @ 09:01)  POCT Blood Glucose.: 113 mg/dL (09-28-21 @ 07:53)  POCT Blood Glucose.: 110 mg/dL (09-28-21 @ 06:44)  POCT Blood Glucose.: 130 mg/dL (09-28-21 @ 05:17)  POCT Blood Glucose.: 156 mg/dL (09-28-21 @ 03:57)  POCT Blood Glucose.: 150 mg/dL (09-28-21 @ 03:08)  POCT Blood Glucose.: 122 mg/dL (09-27-21 @ 22:51)  POCT Blood Glucose.: 124 mg/dL (09-27-21 @ 21:14)  POCT Blood Glucose.: 127 mg/dL (09-27-21 @ 18:51)  POCT Blood Glucose.: 122 mg/dL (09-27-21 @ 17:53)  POCT Blood Glucose.: 121 mg/dL (09-27-21 @ 17:09)  POCT Blood Glucose.: 116 mg/dL (09-27-21 @ 15:59)  POCT Blood Glucose.: 105 mg/dL (09-27-21 @ 14:46)  POCT Blood Glucose.: 101 mg/dL (09-27-21 @ 13:48)  POCT Blood Glucose.: 108 mg/dL (09-27-21 @ 12:55)  POCT Blood Glucose.: 119 mg/dL (09-27-21 @ 11:53)  POCT Blood Glucose.: 127 mg/dL (09-27-21 @ 10:58)  POCT Blood Glucose.: 134 mg/dL (09-27-21 @ 10:05)  POCT Blood Glucose.: 147 mg/dL (09-27-21 @ 07:57)  POCT Blood Glucose.: 162 mg/dL (09-27-21 @ 06:49)  POCT Blood Glucose.: 188 mg/dL (09-27-21 @ 06:02)  POCT Blood Glucose.: 227 mg/dL (09-27-21 @ 05:29)  POCT Blood Glucose.: 182 mg/dL (09-26-21 @ 17:58)  POCT Blood Glucose.: 183 mg/dL (09-26-21 @ 12:27)  POCT Blood Glucose.: 166 mg/dL (09-25-21 @ 17:57)  POCT Blood Glucose.: 105 mg/dL (09-25-21 @ 15:53)      09-28    138  |  106  |  22  ----------------------------<  132<H>  3.6   |  20<L>  |  0.45<L>    EGFR if : 138  EGFR if non : 119    Ca    10.7<H>      09-28  Mg     2.0     09-28  Phos  1.8     09-28    TPro  7.5  /  Alb  4.1  /  TBili  2.1<H>  /  DBili  x   /  AST  19  /  ALT  23  /  AlkPhos  136<H>  09-28      Thyroid Function Tests:  09-28 @ 07:19 TSH -- FreeT4 6.4 T3 -- Anti TPO -- Anti Thyroglobulin Ab -- TSI --  09-25 @ 04:14 TSH <0.01 FreeT4 -- T3 274 Anti TPO -- Anti Thyroglobulin Ab -- TSI --                         Admitted for: Anemia, now with complicated hospital course    Following for: AIT Hyperthyroidism    Subjective: Patient intubated on 9/27 for concern for aspiration PNA.       MEDICATIONS  (STANDING):  chlorhexidine 0.12% Liquid 15 milliLiter(s) Oral Mucosa two times a day  chlorhexidine 2% Cloths 1 Application(s) Topical <User Schedule>  dexMEDEtomidine Infusion 1.5 MICROgram(s)/kG/Hr (23.1 mL/Hr) IV Continuous <Continuous>  dextrose 50% Injectable 25 Gram(s) IV Push once  hydrALAZINE 10 milliGRAM(s) Oral three times a day  hydrALAZINE Injectable 10 milliGRAM(s) IV Push once  hydrocortisone sodium succinate Injectable 50 milliGRAM(s) IV Push every 8 hours  insulin lispro (ADMELOG) corrective regimen sliding scale   SubCutaneous every 6 hours  insulin regular Infusion 2 Unit(s)/Hr (2 mL/Hr) IV Continuous <Continuous>  lidocaine   4% Patch 1 Patch Transdermal daily  methimazole 10 milliGRAM(s) Oral every 8 hours  metoclopramide Injectable 10 milliGRAM(s) IV Push every 8 hours  multivitamin 1 Tablet(s) Oral daily  pantoprazole  Injectable 40 milliGRAM(s) IV Push every 12 hours  propofol Infusion 13.528 MICROgram(s)/kG/Min (5 mL/Hr) IV Continuous <Continuous>  propranolol 40 milliGRAM(s) Oral every 8 hours  sodium chloride 0.9%. 1000 milliLiter(s) (10 mL/Hr) IV Continuous <Continuous>  thiamine 100 milliGRAM(s) Oral daily  vasopressin Infusion 0.1 Unit(s)/Min (6 mL/Hr) IV Continuous <Continuous>    MEDICATIONS  (PRN):  acetaminophen    Suspension .. 650 milliGRAM(s) Enteral Tube every 6 hours PRN Mild Pain (1 - 3)  ondansetron Injectable 4 milliGRAM(s) IV Push every 6 hours PRN Nausea and/or Vomiting  simethicone 80 milliGRAM(s) Chew every 8 hours PRN Gas  sodium chloride 0.9% lock flush 10 milliLiter(s) IV Push every 1 hour PRN Pre/post blood products, medications, blood draw, and to maintain line patency      Allergies    Amiodarone Hydrochloride (Other (Severe))    Intolerances      PHYSICAL EXAM:  VITALS: T(C): 35.6 (09-28-21 @ 08:00)  T(F): 96.1 (09-28-21 @ 08:00), Max: 97.3 (09-27-21 @ 16:00)  HR: 76 (09-28-21 @ 13:00) (72 - 93)  BP: --  RR:  (20 - 39)  SpO2:  (99% - 100%)  Wt(kg): --  GENERAL: NAD, intubated and sedated  EYES: No proptosis  THYROID: Normal size, no palpable nodules  RESPIRATORY: Clear to auscultation bilaterally  CARDIOVASCULAR: Regular rate and rhythm; No murmurs; no peripheral edema  GI: Soft, nontender, non distended, normal bowel sounds  SKIN: Dry      A1C with Estimated Average Glucose Result: 5.4 % (08-15-21 @ 13:52)  A1C with Estimated Average Glucose Result: 5.6 % (06-15-21 @ 02:42)      POCT Blood Glucose.: 126 mg/dL (09-28-21 @ 11:56)  POCT Blood Glucose.: 117 mg/dL (09-28-21 @ 10:44)  POCT Blood Glucose.: 120 mg/dL (09-28-21 @ 09:01)  POCT Blood Glucose.: 113 mg/dL (09-28-21 @ 07:53)  POCT Blood Glucose.: 110 mg/dL (09-28-21 @ 06:44)  POCT Blood Glucose.: 130 mg/dL (09-28-21 @ 05:17)  POCT Blood Glucose.: 156 mg/dL (09-28-21 @ 03:57)  POCT Blood Glucose.: 150 mg/dL (09-28-21 @ 03:08)  POCT Blood Glucose.: 122 mg/dL (09-27-21 @ 22:51)  POCT Blood Glucose.: 124 mg/dL (09-27-21 @ 21:14)  POCT Blood Glucose.: 127 mg/dL (09-27-21 @ 18:51)  POCT Blood Glucose.: 122 mg/dL (09-27-21 @ 17:53)  POCT Blood Glucose.: 121 mg/dL (09-27-21 @ 17:09)  POCT Blood Glucose.: 116 mg/dL (09-27-21 @ 15:59)  POCT Blood Glucose.: 105 mg/dL (09-27-21 @ 14:46)  POCT Blood Glucose.: 101 mg/dL (09-27-21 @ 13:48)  POCT Blood Glucose.: 108 mg/dL (09-27-21 @ 12:55)  POCT Blood Glucose.: 119 mg/dL (09-27-21 @ 11:53)  POCT Blood Glucose.: 127 mg/dL (09-27-21 @ 10:58)  POCT Blood Glucose.: 134 mg/dL (09-27-21 @ 10:05)  POCT Blood Glucose.: 147 mg/dL (09-27-21 @ 07:57)  POCT Blood Glucose.: 162 mg/dL (09-27-21 @ 06:49)  POCT Blood Glucose.: 188 mg/dL (09-27-21 @ 06:02)  POCT Blood Glucose.: 227 mg/dL (09-27-21 @ 05:29)  POCT Blood Glucose.: 182 mg/dL (09-26-21 @ 17:58)  POCT Blood Glucose.: 183 mg/dL (09-26-21 @ 12:27)  POCT Blood Glucose.: 166 mg/dL (09-25-21 @ 17:57)  POCT Blood Glucose.: 105 mg/dL (09-25-21 @ 15:53)      09-28    138  |  106  |  22  ----------------------------<  132<H>  3.6   |  20<L>  |  0.45<L>    EGFR if : 138  EGFR if non : 119    Ca    10.7<H>      09-28  Mg     2.0     09-28  Phos  1.8     09-28    TPro  7.5  /  Alb  4.1  /  TBili  2.1<H>  /  DBili  x   /  AST  19  /  ALT  23  /  AlkPhos  136<H>  09-28      Thyroid Function Tests:  09-28 @ 07:19 TSH -- FreeT4 6.4 T3 -- Anti TPO -- Anti Thyroglobulin Ab -- TSI --  09-25 @ 04:14 TSH <0.01 FreeT4 -- T3 274 Anti TPO -- Anti Thyroglobulin Ab -- TSI --                         Admitted for: Anemia, now with complicated hospital course    Following for: AIT Hyperthyroidism    Subjective: Patient intubated on 9/27 for concern for aspiration PNA.       MEDICATIONS  (STANDING):  chlorhexidine 0.12% Liquid 15 milliLiter(s) Oral Mucosa two times a day  chlorhexidine 2% Cloths 1 Application(s) Topical <User Schedule>  dexMEDEtomidine Infusion 1.5 MICROgram(s)/kG/Hr (23.1 mL/Hr) IV Continuous <Continuous>  dextrose 50% Injectable 25 Gram(s) IV Push once  hydrALAZINE 10 milliGRAM(s) Oral three times a day  hydrALAZINE Injectable 10 milliGRAM(s) IV Push once  hydrocortisone sodium succinate Injectable 50 milliGRAM(s) IV Push every 8 hours  insulin lispro (ADMELOG) corrective regimen sliding scale   SubCutaneous every 6 hours  insulin regular Infusion 2 Unit(s)/Hr (2 mL/Hr) IV Continuous <Continuous>  lidocaine   4% Patch 1 Patch Transdermal daily  methimazole 10 milliGRAM(s) Oral every 8 hours  metoclopramide Injectable 10 milliGRAM(s) IV Push every 8 hours  multivitamin 1 Tablet(s) Oral daily  pantoprazole  Injectable 40 milliGRAM(s) IV Push every 12 hours  propofol Infusion 13.528 MICROgram(s)/kG/Min (5 mL/Hr) IV Continuous <Continuous>  propranolol 40 milliGRAM(s) Oral every 8 hours  sodium chloride 0.9%. 1000 milliLiter(s) (10 mL/Hr) IV Continuous <Continuous>  thiamine 100 milliGRAM(s) Oral daily  vasopressin Infusion 0.1 Unit(s)/Min (6 mL/Hr) IV Continuous <Continuous>    MEDICATIONS  (PRN):  acetaminophen    Suspension .. 650 milliGRAM(s) Enteral Tube every 6 hours PRN Mild Pain (1 - 3)  ondansetron Injectable 4 milliGRAM(s) IV Push every 6 hours PRN Nausea and/or Vomiting  simethicone 80 milliGRAM(s) Chew every 8 hours PRN Gas  sodium chloride 0.9% lock flush 10 milliLiter(s) IV Push every 1 hour PRN Pre/post blood products, medications, blood draw, and to maintain line patency      Allergies    Amiodarone Hydrochloride (Other (Severe))    Intolerances      PHYSICAL EXAM:  VITALS: T(C): 35.6 (09-28-21 @ 08:00)  T(F): 96.1 (09-28-21 @ 08:00), Max: 97.3 (09-27-21 @ 16:00)  HR: 76 (09-28-21 @ 13:00) (72 - 93)  BP: --  RR:  (20 - 39)  SpO2:  (99% - 100%)  Wt(kg): --  GENERAL: intubated and sedated  EYES: No proptosis  RESPIRATORY: intubated  CARDIOVASCULAR: Regular rate and rhythm; No murmurs; no peripheral edema  GI: Soft, nontender, non distended, normal bowel sounds  SKIN: Dry      A1C with Estimated Average Glucose Result: 5.4 % (08-15-21 @ 13:52)  A1C with Estimated Average Glucose Result: 5.6 % (06-15-21 @ 02:42)      POCT Blood Glucose.: 126 mg/dL (09-28-21 @ 11:56)  POCT Blood Glucose.: 117 mg/dL (09-28-21 @ 10:44)  POCT Blood Glucose.: 120 mg/dL (09-28-21 @ 09:01)  POCT Blood Glucose.: 113 mg/dL (09-28-21 @ 07:53)  POCT Blood Glucose.: 110 mg/dL (09-28-21 @ 06:44)  POCT Blood Glucose.: 130 mg/dL (09-28-21 @ 05:17)  POCT Blood Glucose.: 156 mg/dL (09-28-21 @ 03:57)  POCT Blood Glucose.: 150 mg/dL (09-28-21 @ 03:08)  POCT Blood Glucose.: 122 mg/dL (09-27-21 @ 22:51)  POCT Blood Glucose.: 124 mg/dL (09-27-21 @ 21:14)  POCT Blood Glucose.: 127 mg/dL (09-27-21 @ 18:51)  POCT Blood Glucose.: 122 mg/dL (09-27-21 @ 17:53)  POCT Blood Glucose.: 121 mg/dL (09-27-21 @ 17:09)  POCT Blood Glucose.: 116 mg/dL (09-27-21 @ 15:59)  POCT Blood Glucose.: 105 mg/dL (09-27-21 @ 14:46)  POCT Blood Glucose.: 101 mg/dL (09-27-21 @ 13:48)  POCT Blood Glucose.: 108 mg/dL (09-27-21 @ 12:55)  POCT Blood Glucose.: 119 mg/dL (09-27-21 @ 11:53)  POCT Blood Glucose.: 127 mg/dL (09-27-21 @ 10:58)  POCT Blood Glucose.: 134 mg/dL (09-27-21 @ 10:05)  POCT Blood Glucose.: 147 mg/dL (09-27-21 @ 07:57)  POCT Blood Glucose.: 162 mg/dL (09-27-21 @ 06:49)  POCT Blood Glucose.: 188 mg/dL (09-27-21 @ 06:02)  POCT Blood Glucose.: 227 mg/dL (09-27-21 @ 05:29)  POCT Blood Glucose.: 182 mg/dL (09-26-21 @ 17:58)  POCT Blood Glucose.: 183 mg/dL (09-26-21 @ 12:27)  POCT Blood Glucose.: 166 mg/dL (09-25-21 @ 17:57)  POCT Blood Glucose.: 105 mg/dL (09-25-21 @ 15:53)      09-28    138  |  106  |  22  ----------------------------<  132<H>  3.6   |  20<L>  |  0.45<L>    EGFR if : 138  EGFR if non : 119    Ca    10.7<H>      09-28  Mg     2.0     09-28  Phos  1.8     09-28    TPro  7.5  /  Alb  4.1  /  TBili  2.1<H>  /  DBili  x   /  AST  19  /  ALT  23  /  AlkPhos  136<H>  09-28      Thyroid Function Tests:  09-28 @ 07:19 TSH -- FreeT4 6.4 T3 -- Anti TPO -- Anti Thyroglobulin Ab -- TSI --  09-25 @ 04:14 TSH <0.01 FreeT4 -- T3 274 Anti TPO -- Anti Thyroglobulin Ab -- TSI --

## 2021-09-28 NOTE — PROGRESS NOTE ADULT - SUBJECTIVE AND OBJECTIVE BOX
Subjective: Patient seen and examined resting in bed. ON no issues.     Medications:  acetaminophen    Suspension .. 650 milliGRAM(s) Enteral Tube every 6 hours PRN  chlorhexidine 0.12% Liquid 15 milliLiter(s) Oral Mucosa two times a day  chlorhexidine 2% Cloths 1 Application(s) Topical <User Schedule>  dextrose 50% Injectable 25 Gram(s) IV Push once  fluconAZOLE IVPB 100 milliGRAM(s) IV Intermittent every 24 hours  hydrocortisone sodium succinate Injectable 100 milliGRAM(s) IV Push every 8 hours  insulin lispro (ADMELOG) corrective regimen sliding scale   SubCutaneous every 6 hours  insulin regular Infusion 2 Unit(s)/Hr IV Continuous <Continuous>  lidocaine   4% Patch 1 Patch Transdermal daily  lidocaine   Infusion 0.5 mG/Min IV Continuous <Continuous>  methimazole 10 milliGRAM(s) Oral every 8 hours  metoclopramide Injectable 10 milliGRAM(s) IV Push every 8 hours  metoprolol tartrate Injectable 2.5 milliGRAM(s) IV Push every 6 hours  multivitamin 1 Tablet(s) Oral daily  norepinephrine Infusion 0.05 MICROgram(s)/kG/Min IV Continuous <Continuous>  ondansetron Injectable 4 milliGRAM(s) IV Push every 6 hours PRN  pantoprazole  Injectable 40 milliGRAM(s) IV Push every 12 hours  piperacillin/tazobactam IVPB.. 3.375 Gram(s) IV Intermittent every 8 hours  propofol Infusion 13.528 MICROgram(s)/kG/Min IV Continuous <Continuous>  simethicone 80 milliGRAM(s) Chew every 8 hours PRN  sodium chloride 0.9% lock flush 10 milliLiter(s) IV Push every 1 hour PRN  sodium chloride 0.9%. 1000 milliLiter(s) IV Continuous <Continuous>  thiamine 100 milliGRAM(s) Oral daily  vancomycin    Solution 125 milliGRAM(s) Oral every 6 hours  vancomycin  IVPB 1000 milliGRAM(s) IV Intermittent every 12 hours  vasopressin Infusion 0.1 Unit(s)/Min IV Continuous <Continuous>    ICU Vital Signs Last 24 Hrs  T(C): 35.6 (28 Sep 2021 08:00), Max: 36.5 (27 Sep 2021 12:00)  T(F): 96.1 (28 Sep 2021 08:00), Max: 97.7 (27 Sep 2021 12:00)  HR: 87 (28 Sep 2021 08:48) (72 - 97)  BP: --  BP(mean): --  ABP: 117/85 (28 Sep 2021 08:00) (77/66 - 117/85)  ABP(mean): 98 (28 Sep 2021 08:00) (71 - 98)  RR: 22 (28 Sep 2021 08:00) (20 - 23)  SpO2: 100% (28 Sep 2021 08:48) (98% - 100%)      Mode: AC/ CMV (Assist Control/ Continuous Mandatory Ventilation)  RR (machine): 20  TV (machine): 500  FiO2: 40  PEEP: 5  ITime: 1  MAP: 12  PIP: 28      Weight in k.5 ( @ 00:00)    I&O's Summary    27 Sep 2021 07:01  -  28 Sep 2021 07:00  --------------------------------------------------------  IN: 2312.6 mL / OUT: 1445 mL / NET: 867.6 mL    28 Sep 2021 07:01  -  28 Sep 2021 08:51  --------------------------------------------------------  IN: 28.6 mL / OUT: 75 mL / NET: -46.4 mL        Physical Exam  General: intubated and sedated   Chest: intubated, +vent sound   CV: +VAD hum   Abdomen: Soft, non-distended, non-tender  Skin: No rashes or skin breakdown  Neurology: sedated     LVAD Interrogation  VAD TYPE HM 2  Speed 9200  Flow 4.4  Power 5.2  PI  7.2  Assessment of driveline exit site: driveline dressing c/d/i  Programming changes: no changes made     Labs:                        8.8    28.31 )-----------( 120      ( 28 Sep 2021 00:24 )             26.6         138  |  106  |  22  ----------------------------<  132<H>  3.6   |  20<L>  |  0.45<L>    Ca    10.7<H>      28 Sep 2021 00:24  Phos  1.8       Mg     2.0         TPro  7.5  /  Alb  4.1  /  TBili  2.1<H>  /  DBili  x   /  AST  19  /  ALT  23  /  AlkPhos  136<H>              Oxygen Saturation, Mixed: 65.8 ( @ 00:08)  Oxygen Saturation, Mixed: 64.8 ( @ 12:36)  Oxygen Saturation, Mixed: 62.7 ( @ 06:50)    Lactate Dehydrogenase, Serum: 225 U/L ( @ 00:24)  Lactate Dehydrogenase, Serum: 253 U/L ( @ 00:28)  Lactate Dehydrogenase, Serum: 484 U/L ( @ 02:23)

## 2021-09-28 NOTE — PROGRESS NOTE ADULT - PROBLEM SELECTOR PLAN 6
-  was initially decannulated on 9/23 and was ultimately reintubated on 9/26 after developing acute respiratory distress

## 2021-09-28 NOTE — PROGRESS NOTE ADULT - SUBJECTIVE AND OBJECTIVE BOX
RICKY JOINT  MRN#: [id number]  Subjective:  Pulmonary progress  : recurrent Acute hypoxic respiratory Failure ,aspiration pneumonia, NICM  ,   65M PMH ACC/AHA stage D HF due to NICM HM2 LVAD , TV annuloplasty ring 17 as destination therapy due to severe peripheral artery disease with significant stenosis  SIADH, Depression, CKD-3 with hyperkalemia, past E. coli UTIs, driveline drainage (21) and COVID-19 (back in 2020)  He was recently seen in clinic where he complained of abdominal pain and dark stools w constipation back in May. He presents to [facility] ER today weakness and fatigue, moderate and + Black stools for three days, on coumadin secondary to warfarin use in the setting of an LVAD. Patient has required transfusions for GIB in the past. Mostly recently back in 2021 pt had anemia with dark stools. No interventions was done at that time. However Last Endoscopy was done in 2020 (negative). Today labs show patient is anemic with H/H of 4.5/16.3,. INR is 8.84 MAP in the 90s, Temp 35.1. He denies any chest pain, shortness of breath, dizziness, abd pain, nausea or vomiting. found to have  rectal bleeding underwent endoscopy ,old blood in the proximal ileum ,  develop sepsis with LL opacity given Antibiotics , Extubated , reintubated , Bronchoscopy on Zosyn for LL pneumonia  and Amiodarone S/P TV Annuloplasty , patient remain intubated on full ventilatory support .S/P multiple units of blood transfusion , remain on full ventilatory support on Precedex and propofol , new central IJ line , diarrhea C diff. +ve on po vancomycin and IV Flagyl,  mildly distended belly , fever start on cefepime 2gm q 8 hrs S/P tracheostomy .  new RT Subclavian central line continue on contact  isolation ,S/P  C-diff antibiotics, no more diarrhea back on full support mechanical ventilator , chest x ray show improvement in LLL air space disease, more awake and responsive on tube feeding no more diarrhea ,  no nausea or vomiting or diarrhea still very weak and tired , back on tube feeding ,still on po vancomycin , getting PT and OT at bed side ,   , no SOB getting stronger , improve muscle tone patient transfer to monitor bed still on contact isolation for C-Difficel colitis on 50% FI02, and change to Suresh  tube feeding still loose stool . H/H drop significantly require blood transfusion , most likely GI bleeding , IV heparin D/C ,  H/H is stable ., patient develop TR sided  pneumothorax require chest tube placement , RT IJ central line  placed , develop fever shaking chills , blood culture positive for serratia marcescens , start on cefepime .the patient  become hypoxic and hypotensive placed on full ventilatory support and Vasopressin , levophed and Dobutamine ,S/P blood transfusion on meropenem and vancomycin ,   , on and  off pressors , occasional agitation on Precedex .S/P IR cholecystostomy tube drainage placement in the RT upper Quadrant , resume anticoagulation chest x ray noted C-PAP trail lasted only for 2 hrs , new RT SC line and D/C RT IJ line , RT pig tail cathter has been removed , tolerating C-PAP trial placed on trach. collar 50% FI02 GI consultant noted on NG tube feeding as tolerated , develop AF RVR S/P  bolus Amiodarone  back to regular sinus Rhythm , Flat Affect depressed , back on tube feeding Vital AF at 60 cc/ hr .still intermittent abdominal pain , no fever saturation is accepted  back on full ventilatory support ,   on methimazole for hyperthyroidism ,  condition is the same ,still C/O Abdominal pain , white count is improving , no chest pain or SOB ,  .placed on Reglan 10 mg TID for gastric motility , depressed , withdrawn. , S/P  mesenteric angiogram , unable to stent SMA S/P 3 units of blood transfusion   RT IJ in place reassessed for stent in the SMA by vascular,  dark stool stable H/H ,surgical opinion for possible Lap cholecystectomy. over night events noted develop respiratory distress, , rectal bleeding, require intubation placed on full ventilatory support , FI02 is 50% also became hemianosmically unstable require triple pressor support with levophed , vasopressin and norsynephrine, pan culture placed  on Vancomycin IV and PO  and Zosyn, sedated with propofol kept NPO , new central line RT and left IJ cathter placed . Diflucan Added .fever of 38.5 weaned off levophed and norsynephrine  weaned  off vasopressin , sedated on propofol , all culture are negative, resume tube feeding      (2021 16:57)    PAST MEDICAL & SURGICAL HISTORY:  CHF (congestive heart failure)    CAD (coronary artery disease)  Depression    Pleural effusion    History of 2019 novel coronavirus disease (COVID-19)  2020    Hemorrhoids    Bleeding hemorrhoids    Peripheral arterial disease    Claudication    BPH with urinary obstruction    ACC/AHA stage D systolic heart failure  Anticoagulation goal of INR 2.0 to 2.5    Falls    Clavicle fracture    CKD (chronic kidney disease), stage III    Iron deficiency anemia    H/O epistaxis    Vertigo    GI bleed    S/P TVR (tricuspid valve repair)    S/P ventricular assist device    S/P endoscopy    OBJECTIVE:  ICU Vital Signs Last 24 Hrs  T(C): 38.2 (2021 10:00), Max: 38.5 (2021 12:00)  T(F): 100.8 (2021 10:00), Max: 101.3 (2021 12:00)  HR: 65 (2021 10:00) (61 - 69)  BP: --  BP(mean): --  ABP: 105/67 (2021 10:00) (90/54 - 113/64)  ABP(mean): 77 (2021 10:00) (63 - 77)  RR: 20 (2021 10:00) (19 - 35)  SpO2: 99% (2021 10:00) (96% - 100%)       @ : @ 07:00  --------------------------------------------------------  IN: 2693.9 mL / OUT: 1415 mL / NET: 1278.9 mL     @ 07: @ 10:49  --------------------------------------------------------  IN: 420.8 mL / OUT: 115 mL / NET: 305.8 mL  PHYSICAL EXAM: Daily   Elderly male intubated on full ventilatory support FI02 is 40% , on vasopressin  support   Daily Weight in k.4 (2021 00:00)  HEENT:     + NCAT  + EOMI  - Conjuctival edema   - Icterus   - Thrush   - ETT  + NGT/OGT  Neck:         + FROM  RT IJ , LT IJ  lines JVD  - Nodes - Masses + Mid-line trachea - Tracheostomy  Chest:            normal A-P diameter    Lungs:          + CTA   + Rhonchi    - Rales    - Wheezing + Decreased  LT BS   - Dullness R L  Cardiac:       + S1 + S2    + RRR   - Irregular   - S3  - S4    - Murmurs   - Rub   - Hamman’s sign   Abdomen:    + BS  + Soft + Non-tender - Distended - Organomegaly - PEG .cholecystostomy tube in place  Extremities:   - Cyanosis U/L   - Clubbing  U/L  + LE/UE Edema   + Capillary refill    + Pulses   Neuro:        - Awake   -  Alert   - Confused   - Lethargic   + Sedated  + Generalized Weakness  Skin:        - Rashes    - Erythema   + Normal incisions   + IV sites intact          HOSPITAL MEDICATIONS: All medications reviewed and analyzed  MEDICATIONS  (STANDING):  amiodarone    Tablet 200 milliGRAM(s) Oral daily  chlorhexidine 0.12% Liquid 15 milliLiter(s) Oral Mucosa every 12 hours  chlorhexidine 2% Cloths 1 Application(s) Topical <User Schedule>  dexmedetomidine Infusion 0.5 MICROgram(s)/kG/Hr (9.81 mL/Hr) IV Continuous <Continuous>  dextrose 50% Injectable 50 milliLiter(s) IV Push every 15 minutes  heparin  Infusion 400 Unit(s)/Hr (12.5 mL/Hr) IV Continuous <Continuous>  Hydromorphone  Injectable 0.5 milliGRAM(s) IV Push once  insulin lispro (ADMELOG) corrective regimen sliding scale   SubCutaneous every 6 hours  pantoprazole  Injectable 40 milliGRAM(s) IV Push every 12 hours  piperacillin/tazobactam IVPB.. 3.375 Gram(s) IV Intermittent every 8 hours  propofol Infusion 20 MICROgram(s)/kG/Min (9.42 mL/Hr) IV Continuous <Continuous>  sodium chloride 0.9% lock flush 3 milliLiter(s) IV Push every 8 hours  sodium chloride 0.9%. 1000 milliLiter(s) (10 mL/Hr) IV Continuous <Continuous>    MEDICATIONS  (PRN):  acetaminophen    Suspension .. 650 milliGRAM(s) Oral every 6 hours PRN Temp greater or equal to 38C (100.4F)    LABS: All Lab data reviewed and analyzed                                     .31 )-----------( 120      ( 28 Sep 2021 00:24 )             26.6       138  |  106  |  22  ----------------------------<  132<H>  3.6   |  20<L>  |  0.45<L>    Ca    10.7<H>      28 Sep 2021 00:24  Phos  1.8       Mg     2.0         TPro  7.5  /  Alb  4.1  /  TBili  2.1<H>  /  DBili  x   /  AST  19  /  ALT  23  /  AlkPhos  136<H>      Ca    10.2      27 Sep 2021 00:28  Phos  2.6       Mg     2.1         TPro  7.7  /  Alb  4.7  /  TBili  2.5<H>  /  DBili  x   /  AST  26  /  ALT  26  /  AlkPhos  147<H>      Ca    9.7      26 Sep 2021 02:23  Phos  3.0       Mg     1.6         TPro  8.1  /  Alb  3.5  /  TBili  3.2<H>  /  DBili  x   /  AST  50<H>  /  ALT  49<H>  /  AlkPhos  295<H>                                                                                                                                                            PTT - ( 2021 04:52 )  PTT:45.2 sec LIVER FUNCTIONS - ( 2021 00:42 )  Alb: 3.4 g/dL / Pro: 6.7 g/dL / ALK PHOS: 213 U/L / ALT: 15 U/L / AST: 24 U/L / GGT: x           RADIOLOGY: - Reviewed and analyzed RT Pig tail cathter  , LVAD HM2, CT scan of abdomen reviewed result noted

## 2021-09-28 NOTE — PROGRESS NOTE ADULT - PROBLEM SELECTOR PLAN 2
- Continue current speed of 9200 RPM. Speed increased this admission due to signs of inadequately unloaded left ventricle  - Will remain off all AC and Aspirin indefinitely given recurrent GI bleeding. Recently tried heparin trail and starting having episodes of melena   - Continue to trend LDH level  - May need to consider repeating ECHO, last ECHO was completed 7/26  - Please consult palliative for GOC as patients prognosis is poor - Continue current speed of 9200 RPM. Speed increased this admission due to signs of inadequately unloaded left ventricle  - Will remain off all AC and Aspirin indefinitely given recurrent GI bleeding. Recently tried heparin trail and starting having episodes of melena   - Continue to trend LDH level  - May need to consider repeating ECHO, last ECHO was completed 7/26  - Palliative care following, family meeting schedule for tomorrow

## 2021-09-28 NOTE — PROGRESS NOTE ADULT - PROBLEM SELECTOR PLAN 7
- endocrine recs appreciated  - currently on methimazole  - Steroid taper completed  - Continue to monitor TFT post angiogram - endocrine recs appreciated, please have them see patient today. may need to consider resuming propanolol   - currently on methimazole  - Steroid taper completed  - Continue to monitor TFT post angiogram

## 2021-09-28 NOTE — PROGRESS NOTE ADULT - PROBLEM SELECTOR PLAN 1
- Wean pressors as tolerated  - Appears dry, will be given Albumin 250 cc; goal CVP 6-8 - has been weaned off pressors  - please d/c metoprolol and start Hydralazine 10 mg PO TID for HTN (MAP goal 70-80)

## 2021-09-28 NOTE — PROGRESS NOTE ADULT - ASSESSMENT
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 coli UTIs, driveline drainage (1/7/21) and COVID-19 (back in April 2020)  He was recently seen in clinic where he complained of abdominal pain and dark stools w constipation back in May. He presents to [facility] ER today weakness and fatigue, moderate and + Black stools for three days, on coumadin secondary to warfarin use in the setting of an LVAD. Patient has required transfusions for GIB in the past. Mostly recently back in jan 2021 pt had anemia with dark stools. No interventions was done at that time. However Last Endoscopy was done in July 2020 (negative). Today labs show patient is anemic with H/H of 4.5/16.3,. INR is 8.84 MAP in the 90s,   Returned from endoscopy appearing ill tachypneic with chills with new white out of his left lung      A/p  s/p LVAD placement  admission prolonged following GI bleeding, aspiration event, CDI, VAP, chronic abdominal pain, most recently with retroperitoneal bleeding post attempted stent placement  Acute event while on the floor with possible aspiration and required re-intubation and transfer to cTU  Antibiotics currently completed    GOC discussion with family planned 9/29    prognosis poor    Discussed with CTU    Morris Prater MD  [phone]  After 5pm/weekends [phone]

## 2021-09-28 NOTE — PROGRESS NOTE ADULT - ASSESSMENT
65 year old male with history of Stage D HF due to NICM on LVAD, TV annuloplasty ring 9/12/17 as destination therapy due to severe peripheral artery disease with significant stenosis  SIADH, Depression, CKD-3 with hyperkalemia, admitted 6/14/21 with symptomatic anemia with Hgb 4.5 in setting of INR 8.8, thereafter with complicated hospital course including VAP, serratia bacteremia with acalculous cholecystitis s/p percutaneous tube, abdominal pain s/p attempted SMA stent on 9/10/21, RP bleed, now reintubated in setting of aspiration PNA.     Endocrine consulted for management of hyperthyroidism in the setting of recent amiodarone use and 2 IV contrast CTs with (7/28 and 8/13) and steroid induced hyperglycemia on tube feeds, now resolved    1. Hyperthyroidism likely 2/2 Amiodarone and contrast induced thyroiditis  Type 1 vs. Type 2 AIT, exacerbated by contrast   s/p steroid taper  FT4 improved initially, but have steadily increased. FT4 is 6.4 (from 4.9 and 4.0 before)  Currently on LT4 10 mg q8 hrs. Also on propranolol 40 mg q8 hrs    Recs  -Worsening TFTs are likely related to recent contrast load for mesenteric angiogram  -Increase Methimazole to ___  -Check Free T4 and total T3 on 10/1  -Continue Propranolol 40 mg q 8 hrs. Goal HR 60-80. HR has been in the 90s recently. Will hold off on further Propranolol adjustments at this time    2. Steroid induced hyperglycemia, s/p completion of steroids  Patient is currently receiving Hydrocortisone (50 mg q 8 hrs on 9/28, then 25 mg q 8 hrs on 9/29)  Continue insulin gtt for now while patient is intubated  Will assist with transition when needed        Sia Aceves MD  Endocrine Fellow  Pager: [phone]/TIMBO [phone]  Consults 9am-5pm: [phone]  After 5pm and weekends: [phone]   65 year old male with history of Stage D HF due to NICM on LVAD, TV annuloplasty ring 9/12/17 as destination therapy due to severe peripheral artery disease with significant stenosis  SIADH, Depression, CKD-3 with hyperkalemia, admitted 6/14/21 with symptomatic anemia with Hgb 4.5 in setting of INR 8.8, thereafter with complicated hospital course including VAP, serratia bacteremia with acalculous cholecystitis s/p percutaneous tube, abdominal pain s/p attempted SMA stent on 9/10/21, RP bleed, now reintubated in setting of aspiration PNA.     Endocrine consulted for management of hyperthyroidism in the setting of recent amiodarone use and 2 IV contrast CTs with (7/28 and 8/13) and steroid induced hyperglycemia on tube feeds, now resolved    1. Hyperthyroidism likely 2/2 Amiodarone and contrast induced thyroiditis  Type 1 vs. Type 2 AIT, exacerbated by contrast   s/p steroid taper  FT4 improved initially, but have steadily increased. FT4 is 6.4 (from 4.9 and 4.0 before)  Currently on LT4 10 mg q8 hrs. Also on propranolol 40 mg q8 hrs    Recs  -Worsening TFTs are likely related to recent contrast load for mesenteric angiogram  -Increase Methimazole to ___  -Check Free T4 and total T3 on 10/1  -Continue Propranolol 40 mg q 8 hrs. Goal HR 60-80. HR has been in the 90s recently. Will hold off on further Propranolol adjustments at this time    2. Steroid induced hyperglycemia, s/p completion of steroids  Patient is currently receiving Hydrocortisone for sepsis, will be done on 9/30  -Insulin gtt off at this time and FS are at goal   -Continue low dose correctional scale q6 hrs       Sia Aceves MD  Endocrine Fellow  Pager: [phone]/TIMBO [phone]  Consults 9am-5pm: [phone]  After 5pm and weekends: [phone]   65 year old male with history of Stage D HF due to NICM on LVAD, TV annuloplasty ring 9/12/17 as destination therapy due to severe peripheral artery disease with significant stenosis  SIADH, Depression, CKD-3 with hyperkalemia, admitted 6/14/21 with symptomatic anemia with Hgb 4.5 in setting of INR 8.8, thereafter with complicated hospital course including VAP, serratia bacteremia with acalculous cholecystitis s/p percutaneous tube, abdominal pain s/p attempted SMA stent on 9/10/21, RP bleed, now reintubated in setting of aspiration PNA.     Endocrine consulted for management of hyperthyroidism in the setting of recent amiodarone use and 2 IV contrast CTs with (7/28 and 8/13) and steroid induced hyperglycemia on tube feeds, now resolved    1. Hyperthyroidism likely 2/2 Amiodarone and contrast induced thyroiditis  Type 1 vs. Type 2 AIT, unclear which type, exacerbated by contrast   s/p steroid taper  FT4 improved initially, but have steadily increased. FT4 is 6.4 (from 4.9 and 4.0 before).   Currently on LT4 10 mg q8 hrs. Also on propranolol 40 mg q8 hrs    Recs  -Worsening TFTs are likely related to recent contrast loads  -Increase Methimazole to 20 mg TID (was on MMI 10 mg TID and continues to have rising FT4)  -Check Free T4 and total T3 on 9/30  -Continue Propranolol 40 mg q 8 hrs. Goal HR 60-80. HR has been in the 90s recently. Will hold off on further Propranolol adjustments at this time  -Please continue to monitor CMP daily to assess bilirubin and LFTs. MMI affects the biliary tree    2. Steroid induced hyperglycemia, s/p completion of steroids  Patient is currently receiving Hydrocortisone for sepsis, will be done on 9/30  -Insulin gtt off at this time and FS are at goal   -Continue low dose correctional scale q6 hrs     Discussed with Dr. Reyes and primary team     Sia Aceves MD  Endocrine Fellow  Pager: [phone]/TIMBO [phone]  Consults 9am-5pm: [phone]  After 5pm and weekends: [phone]   65 year old male with history of Stage D HF due to NICM on LVAD, TV annuloplasty ring 9/12/17 as destination therapy due to severe peripheral artery disease with significant stenosis  SIADH, Depression, CKD-3 with hyperkalemia, admitted 6/14/21 with symptomatic anemia with Hgb 4.5 in setting of INR 8.8, thereafter with complicated hospital course including VAP, serratia bacteremia with acalculous cholecystitis s/p percutaneous tube, abdominal pain s/p attempted SMA stent on 9/10/21, RP bleed, now reintubated in setting of aspiration PNA.     Endocrine consulted for management of hyperthyroidism in the setting of recent amiodarone use and 2 IV contrast CTs with (7/28 and 8/13) and steroid induced hyperglycemia on tube feeds, now resolved    1. Hyperthyroidism likely 2/2 Amiodarone and contrast induced thyroiditis  Type 1 vs. Type 2 AIT, unclear which type, exacerbated by contrast   s/p steroid taper  FT4 improved initially, but have steadily increased. FT4 is 6.4 (from 4.9 and 4.0 before).   Currently on LT4 10 mg q8 hrs. Also on propranolol 40 mg q8 hrs    Recs  -Worsening TFTs are likely related to recent contrast loads  -Increase Methimazole to 20 mg TID (was on MMI 10 mg TID and continues to have rising FT4). Continue this dose for a short course (24-48 hrs), will adjust based on labs  -Check Free T4 and total T3 on 9/30  -Continue Propranolol 40 mg q 8 hrs. Goal HR 60-80. HR has been in the 90s recently. Will hold off on further Propranolol adjustments at this time  -Please continue to monitor CMP daily to assess bilirubin and LFTs. MMI affects the biliary tree    2. Steroid induced hyperglycemia, s/p completion of steroids  Patient is currently receiving Hydrocortisone for sepsis, will be done on 9/30  -Insulin gtt off at this time and FS are at goal   -Continue low dose correctional scale q6 hrs     Discussed with Dr. Reyes and primary team     Sia Aceves MD  Endocrine Fellow  Pager: [phone]/TIMBO [phone]  Consults 9am-5pm: [phone]  After 5pm and weekends: [phone]   65 year old male with history of Stage D HF due to NICM on LVAD, TV annuloplasty ring 9/12/17 as destination therapy due to severe peripheral artery disease with significant stenosis  SIADH, Depression, CKD-3 with hyperkalemia, admitted 6/14/21 with symptomatic anemia with Hgb 4.5 in setting of INR 8.8, thereafter with complicated hospital course including VAP, serratia bacteremia with acalculous cholecystitis s/p percutaneous tube, abdominal pain s/p attempted SMA stent on 9/10/21, RP bleed, now reintubated in setting of aspiration PNA.     Endocrine consulted for management of hyperthyroidism in the setting of recent amiodarone use and 2 IV contrast CTs with (7/28 and 8/13) and steroid induced hyperglycemia on tube feeds, now resolved    1. Hyperthyroidism likely 2/2 Amiodarone and contrast induced thyroiditis  Type 1 vs. Type 2 AIT, unclear which type, exacerbated by contrast   s/p steroid taper  FT4 improved initially, but have steadily increased. FT4 is 6.4 (from 4.9 and 4.0 before).   Currently on methimazole 10 mg q8 hrs. Also on propranolol 40 mg q8 hrs    Recs  -Worsening TFTs are likely related to recent contrast loads  -Increase Methimazole to 20 mg TID (was on MMI 10 mg TID and continues to have rising FT4 likely due to recent contrast load).   -Continue this dose for a short course (24-48 hrs), will adjust based on labs  -also restarted on hydrocortisone 9/28 in setting of worsening clinical status   -Check Free T4 and total T3 on 9/30  -Continue Propranolol 40 mg q 8 hrs. Goal HR 60-80. HR has been in the 90s recently. Will hold off on further Propranolol adjustments at this time  -Please continue to monitor CMP daily to assess bilirubin and LFTs. MMI can have rare SE of agranuocytosis and hepatic dysfuntion  current increase in bili is multifactorial and likely also component of venous congestion and would not stop MMI for increase in bili,  monitor LFTS closely     2. Steroid induced hyperglycemia, s/p completion of steroids  Patient is currently receiving Hydrocortisone for sepsis, will be done on 9/30  -Insulin gtt off at this time and FS are at goal   -Continue low dose correctional scale q6 hrs     Discussed with Dr. Reyes and primary team     Sia Aceves MD  Endocrine Fellow  Pager: [phone]/TIMBO [phone]  Consults 9am-5pm: [phone]  After 5pm and weekends: [phone]        Denisha Reyes MD  Attending Physician   Department of Endocrinology, Diabetes and Metabolism     Pager [phone] ([facility])/ [phone] ([facility]) [please provide 10 digit call back number]  [facility] 9-5  [email or web address]  Nights and weekends: [phone]  Please note that this patient may be followed by a different provider tomorrow.   If no answer or after hours, please contact [phone].  For final dc reccomendations, please call [phone] or page the endocrine fellow on call.

## 2021-09-28 NOTE — PROGRESS NOTE ADULT - ATTENDING COMMENTS
We discussed the findings and plan. I agree with fellow note as outlined above.  increase MMI with close montioring of LFTS  continue beta blocker   high dose steroids currrently being used to treat infection can also help with treating hyperthyroidism

## 2021-09-28 NOTE — PROGRESS NOTE ADULT - PROBLEM SELECTOR PLAN 5
- postop angiogram H/H continue to drop. Intraop received 3uPRBC and post-op required additional 2uPRBC (last on 9/12).   - Continue to trend H/H and have active type and screen  - CT Angio 9/12 shows moderate sized left-sided retroperitoneal hematoma with intraperitoneal extension. 2 cm segment of right femoral artery occlusion with distal reconstitution. - postop angiogram H/H continue to drop. Intraop received 3uPRBC and post-op required additional 2uPRBC (last on 9/12).   - after starting heparin gtt, started having episodes of melena. Heparin gtt d/c and was transfused last on 9/26   - Continue to trend H/H and have active type and screen  - CT Angio 9/12 shows moderate sized left-sided retroperitoneal hematoma with intraperitoneal extension. 2 cm segment of right femoral artery occlusion with distal reconstitution.

## 2021-09-28 NOTE — PROGRESS NOTE ADULT - ATTENDING COMMENTS
Off pressors. Hypertensive. Sedated and remains chronically ill, nl JVP, LVAD hum, nontender abdomen, no pedal edema. Labs reviewed.   - abx as above  - c/w pall care and psychiatry for GOC/capacity; family mtg to be held tomorrow  - patient previously declined PEG/PEJ or other surgeries  - prognosis guarded

## 2021-09-29 NOTE — GOALS OF CARE CONVERSATION - ADVANCED CARE PLANNING - CONVERSATION DETAILS
Family members, as above, updated about patient's progress so far. Primary problems identified: Chronic abdominal pain 2/2 SMA stenosis that is not amenable to stenting due to concerns about need for blood thinners after given h/o recurrent GI bleed and ongoing anemia. Respiratory distress due to recurrent aspiration from refractory vomiting requiring recurrent intubations and tracheostomy in the past.    Family able to teach back providers about their understanding of the same.    Current situation with patient being sedated explained to them, plan is to be able to wean him off of sedation and assess capacity to make decisions. Family informed that if this does not happen, the HCP  would need to be in a situation to understand the above and have ongoing discussions with the multidisciplinary team about decisions to move forward which may include palliative care in a broader sense.    A time frame of 4 days was decided tentatively to give the family time to process the above as well as the patient to recover to a point where he is able to make decisions for himself.     Questions with respect to diagnosis and prognosis were offered. Son stated " I am not going to pull the plug or anything but we also want him to be comfortable".     The team offered to continue to keep in touch with the family through this process and they were receptive of the same. Family members, as above, updated about patient's progress so far. Primary problems identified: Chronic abdominal pain 2/2 SMA stenosis that is not amenable to stenting due to concerns about need for blood thinners after given h/o recurrent GI bleed and ongoing anemia. Respiratory distress due to recurrent aspiration from refractory vomiting requiring recurrent intubations and tracheostomy in the past.    Family able to teach back providers about their understanding of the same.    Current situation with patient being sedated explained to them, plan is to be able to wean him off of sedation and assess capacity to make decisions. Family informed that if this does not happen, the HCP  would need to be in a situation to understand the above and have ongoing discussions with the multidisciplinary team about decisions to move forward which may include palliative care in a broader sense.    A time frame of 4 days was decided tentatively to give the family time to process the above as well as the patient to recover to a point where he is able to make decisions for himself.     Questions with respect to diagnosis and prognosis were offered. Son stated " I am not going to pull the plug or anything but we also want him to be comfortable".     The team offered to continue to keep in touch with the family through this process and they were receptive of the same.  _________________________________________________________________________________________________    Attending's note.     I agree with the fellow's note above as it clearly describe my goals of care discussion with the patient's family and the multidisciplinary team as above.     Francisco Burgos MD.   Pager: [phone]  after hours and weekends: [phone].

## 2021-09-29 NOTE — PROGRESS NOTE ADULT - ASSESSMENT
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 coli UTIs, driveline drainage (1/7/21) and COVID-19 (back in April 2020)  He was recently seen in clinic where he complained of abdominal pain and dark stools w constipation back in May. He presents to [facility] ER today weakness and fatigue, moderate and + Black stools for three days, on coumadin secondary to warfarin use in the setting of an LVAD. Patient has required transfusions for GIB in the past. Mostly recently back in jan 2021 pt had anemia with dark stools. No interventions was done at that time. However Last Endoscopy was done in July 2020 (negative). Today labs show patient is anemic with H/H of 4.5/16.3,. INR is 8.84 MAP in the 90s,   Returned from endoscopy appearing ill tachypneic with chills with new white out of his left lung      A/p  s/p LVAD placement  admission prolonged following GI bleeding, aspiration event, CDI, VAP, chronic abdominal pain, most recently with retroperitoneal bleeding post attempted stent placement  Acute event while on the floor with possible aspiration and required re-intubation and transfer to cTU  Antibiotics currently completed and being monitored off antimicrobials    GOC discussion with family in progress and with patient directly once sedation weans    Morris Prater MD  [phone]  After 5pm/weekends [phone]

## 2021-09-29 NOTE — PROGRESS NOTE ADULT - SUBJECTIVE AND OBJECTIVE BOX
Behavioral Cardiology Progress Note     History of present illness: Mr. Quispe is a 66 year-old man with history of NICM s/p HM2 on 9/2017 as DT (due to severe PAD) with TV ring, prior COVID-19 infection 4/2020, recurrent syncope post LVAD s/p ILR, and chronic abdominal pain with prior negative workup who was admitted with symptomatic anemia with Hgb 4.5 in setting of INR 8.8 without hemodynamic instability. Testing showed active bleeding in the small bowel but subsequent enteroscopy 6/15 did not reveal any active bleeding. He acutely decompensated after procedure with fever/hypertension, low flow alarms, and pulmonary infiltrate with hypoxia requiring intubation from probable aspiration PNA.  Course notable for inability to wean ventilator with persistent secretions for which he underwent tracheostomy as well as acute c diff colitis.     Social history: , lives in his sister’s house in [place]. Has 3 sons (2 live in , 1 in [facility]). One of 3 siblings. Lives in his sister’s house in [place] (Cristina Rudolph [phone]), also in the home are niece [facility] [phone]) and nephew (Miller Rudolph).  Another sister lives close by in [place] and all other siblings live in [facility] [place] [facility] which the family visit often.  Worked full time at [employer] in [place], stopped working due to heart disease. Education: high school graduate.     Substance use:   Tobacco: Former smoker, 8-10 cigarettes/day for 30 years.   Alcohol: Past use of 3-4 drinks of Johnson 2x/week. None for past 4 years.   Drug: Denies any drug use.

## 2021-09-29 NOTE — PROGRESS NOTE ADULT - ASSESSMENT
Mental status exam: Seen lying in bed in CTU. Intubated with mild sedation. Following simple commands appropriately. Communicating via moving his hands, shaking head yes/no. Unable to assess orientation, thought process. Affect irritable.      Psychological assessment: Seen lying in bed in CTU. Intubated with mild sedation. Eyes opened when name called. Following simple commands. Communicating via moving his hands, shaking head yes/no. Affect irritable. Frustrated with having mitts on for safety (as per staff, was trying to pull at lines). Provided emotional support and validation of feelings given current medical issues.     Family meeting:  Earlier in day, family meeting was held to update Mr. Quispe's family on current medical status. On the conference call were his sister, Alyssa Rudolph (HCP), sons, Kang and jasiel [place] Patricia.   Multidisciplinary team members included: attending (Dr. Son), palliative (Dr. Burgos, Dr. Kelly), ethics (Dr. Duffy), LVAD coordinators (LISA Mcclendon, PATIENCE, LISA Burns, RN), SW (Ashish Conley, Heather covering for Jhoana Mcclain), and this writer. Family members were on conference call (sister -Alyssa Rudolph HCP, sons -Kang and Vignesh, sammieece -Celestina).     Dr. Son updated family members on current diagnosis and prognosis including chronic abdominal pain due to SMA stenosis that is not amenable to treatment given need for anticoagulation for procedure and history of bleeding. Also discussed recent respiratory distress and need for intubation from refractory vomiting.     Dr. Burgos reviewed family’s understanding of the information and family was able to teach back this information. Plan is to hopefully wean patient off sedation and determine his capacity to make autonomous decisions. Discussed with family that if this is not possible, his HCP (sister Alyssa Rudolph) would need to make decisions regarding goals of care.     Dx: Adjustment disorder with anxiety and depressed mood. F43.23 Systolic heart failure I50.2  LVAD Z95.811    Recommendations:   Behavioral Cardiology will follow      20 minutes spent on total patient encounter    Jessica Hennessy, PhD  [phone]

## 2021-09-29 NOTE — CHART NOTE - NSCHARTNOTEFT_GEN_A_CORE
Ethicist REX Duffy attended a team/family meeting on 9/29/2021 at [phone]. Those present included palliative care, heart failure, CTICU team, LVAD coordination team, social work. Patient's family (sons Kang and Vignesh and sister/health care agent Alyssa) on conference call. Dr. Son from Heart Failure Cardiology provided a medical update. Discussed recent events regarding patient's hospitalization. Explained that patient has stenosis of the mesenteric artery which has been causing issues with feeding. Explained patient is currently sedated and intubated, however has been showing signs of improving mental status.     Prepared family for potential decisions that need to be made if the patient's condition were to worsen. Plan is to give the patient some time to be weaned from sedation so he can exercise his own autonomy.    Thank you for involving ethics. At this point no ethical issue has presented itself. Please re-consult as needed.    Matt Duffy, Joe, DEBORAH, [facility]  Medical Ethicist  [phone]

## 2021-09-29 NOTE — PROGRESS NOTE ADULT - ASSESSMENT
Assessment and Recommendation:   · Assessment	  Assessment and recommendation :  Recurrent Acute hypoxic respiratory Failure reintubated on full ventilator support FI02 is 40%  Acute blood loss anemia S/P multiple  blood transfusion   recurrent  septic shock weaned off vasopressin ,   hemorrhagic shock receiving 3 unit of blood transfusion   pan culture negative S/P  IV vancomycin , po vancomycin and Zosyn and Diflucan   S/P cholecystostomy tube placement by IR    AF RVR back to regular sinus Rhythm   Non ischemic cardiomyopathy continue ACE inhibitor and B-Blockers   mesenteric ischemia failure to stent SMA , no plan for repeated trial   S/P 3 unit of blood transfusion   S/P Septic shock and cardiogenic shock   Stage D systolic heart failure S/P LVAD HM2   MH2 LVAD  with  TV Annuloplasty  Severe peripheral vascular disease   severe hyperglycemia on insulin coverage    Reglan 10 mg for Gastric Motility   hyperthyroidism on Methimazole 10mg TID   critical care polyneuropathy   Anemia of Acute blood Loss   severe protein caloric malnutrition   Chronic kidney disease stage III  Depressed and withdrawn   resume NG tube feeding   GI prophylaxis with PPI   Discussed with TCV team   critical care time is 35 minutes

## 2021-09-29 NOTE — PROGRESS NOTE ADULT - SUBJECTIVE AND OBJECTIVE BOX
INFECTIOUS DISEASES FOLLOW UP-- Anitra Prater  [phone]    This is a follow up note for this  66yMale with  Anemia  weaning sedation            ROS:  CONSTITUTIONAL:  No fever, sleepy but arousable  CARDIOVASCULAR:  No chest pain or palpitations  RESPIRATORY:  No dyspnea  GASTROINTESTINAL:  No nausea, vomiting, diarrhea, or abdominal pain  GENITOURINARY:  No dysuria  NEUROLOGIC:  No headache,     Allergies    Amiodarone Hydrochloride (Other (Severe))    Intolerances        ANTIBIOTICS/RELEVANT:  antimicrobials    immunologic:    OTHER:  acetaminophen    Suspension .. 650 milliGRAM(s) Enteral Tube every 6 hours PRN  chlorhexidine 0.12% Liquid 15 milliLiter(s) Oral Mucosa two times a day  chlorhexidine 2% Cloths 1 Application(s) Topical <User Schedule>  dexMEDEtomidine Infusion 1.5 MICROgram(s)/kG/Hr IV Continuous <Continuous>  hydrALAZINE 25 milliGRAM(s) Oral every 8 hours  insulin lispro (ADMELOG) corrective regimen sliding scale   SubCutaneous every 6 hours  lidocaine   4% Patch 1 Patch Transdermal daily  methimazole 20 milliGRAM(s) Oral every 8 hours  metoclopramide Injectable 10 milliGRAM(s) IV Push every 8 hours  multivitamin 1 Tablet(s) Oral daily  ondansetron Injectable 4 milliGRAM(s) IV Push every 6 hours PRN  pantoprazole  Injectable 40 milliGRAM(s) IV Push every 12 hours  propofol Infusion 13.528 MICROgram(s)/kG/Min IV Continuous <Continuous>  propranolol 40 milliGRAM(s) Oral every 8 hours  simethicone 80 milliGRAM(s) Chew every 8 hours PRN  sodium chloride 0.9%. 1000 milliLiter(s) IV Continuous <Continuous>  thiamine 100 milliGRAM(s) Oral daily      Objective:  Vital Signs Last 24 Hrs  T(C): 36.4 (29 Sep 2021 20:00), Max: 37.5 (29 Sep 2021 00:00)  T(F): 97.5 (29 Sep 2021 20:00), Max: 99.5 (29 Sep 2021 00:00)  HR: 84 (29 Sep 2021 22:00) (79 - 91)  BP: --  BP(mean): --  RR: 20 (29 Sep 2021 22:00) (0 - 25)  SpO2: 99% (29 Sep 2021 22:00) (99% - 100%)    PHYSICAL EXAM:  Constitutional:no acute distress remains with trach stoma and ET tube intubated  Eyes:ALONSO, EOMI  Ear/Nose/Throat: no oral lesions, few secretions from old stoma	  Respiratory: clear BL  Cardiovascular: S1S2 VAd sounds  Gastrointestinal:soft, (+) BS, no tenderness cholecystotomy tube  Extremities:no e/e/c  No Lymphadenopathy  IV sites not inflammed.    LABS:                        10.3   17.63 )-----------( 114      ( 29 Sep 2021 00:49 )             32.3     09-29    144  |  111<H>  |  23  ----------------------------<  157<H>  4.0   |  19<L>  |  0.48<L>    Ca    10.8<H>      29 Sep 2021 00:49  Phos  3.8     09-29  Mg     1.7     09-29    TPro  7.6  /  Alb  3.8  /  TBili  1.8<H>  /  DBili  x   /  AST  14  /  ALT  21  /  AlkPhos  167<H>  09-29          MICROBIOLOGY:            RECENT CULTURES:  09-27 @ 14:26  .Sputum Sputum  Enterobacter cloacae complex  Enterobacter cloacae complex  CLAU    Moderate Enterobacter cloacae complex  Normal Respiratory Bria present  --  09-26 @ 03:25  .Blood Blood-Peripheral  --  --  --    No growth to date.  --      RADIOLOGY & ADDITIONAL STUDIES:

## 2021-09-29 NOTE — PROGRESS NOTE ADULT - SUBJECTIVE AND OBJECTIVE BOX
RICKY JOINT  MRN#: [id number]  Subjective:  Pulmonary progress  : recurrent Acute hypoxic respiratory Failure ,aspiration pneumonia, NICM  ,   65M PMH ACC/AHA stage D HF due to NICM HM2 LVAD , TV annuloplasty ring 17 as destination therapy due to severe peripheral artery disease with significant stenosis  SIADH, Depression, CKD-3 with hyperkalemia, past E. coli UTIs, driveline drainage (21) and COVID-19 (back in 2020)  He was recently seen in clinic where he complained of abdominal pain and dark stools w constipation back in May. He presents to [facility] ER today weakness and fatigue, moderate and + Black stools for three days, on coumadin secondary to warfarin use in the setting of an LVAD. Patient has required transfusions for GIB in the past. Mostly recently back in 2021 pt had anemia with dark stools. No interventions was done at that time. However Last Endoscopy was done in 2020 (negative). Today labs show patient is anemic with H/H of 4.5/16.3,. INR is 8.84 MAP in the 90s, Temp 35.1. He denies any chest pain, shortness of breath, dizziness, abd pain, nausea or vomiting. found to have  rectal bleeding underwent endoscopy ,old blood in the proximal ileum ,  develop sepsis with LL opacity given Antibiotics , Extubated , reintubated , Bronchoscopy on Zosyn for LL pneumonia  and Amiodarone S/P TV Annuloplasty , patient remain intubated on full ventilatory support .S/P multiple units of blood transfusion , remain on full ventilatory support on Precedex and propofol , new central IJ line , diarrhea C diff. +ve on po vancomycin and IV Flagyl,  mildly distended belly , fever start on cefepime 2gm q 8 hrs S/P tracheostomy .  new RT Subclavian central line continue on contact  isolation ,S/P  C-diff antibiotics, no more diarrhea back on full support mechanical ventilator , chest x ray show improvement in LLL air space disease, more awake and responsive on tube feeding no more diarrhea ,  no nausea or vomiting or diarrhea still very weak and tired , back on tube feeding ,still on po vancomycin , getting PT and OT at bed side ,   , no SOB getting stronger , improve muscle tone patient transfer to monitor bed still on contact isolation for C-Difficel colitis on 50% FI02, and change to Suresh  tube feeding still loose stool . H/H drop significantly require blood transfusion , most likely GI bleeding , IV heparin D/C ,  H/H is stable ., patient develop TR sided  pneumothorax require chest tube placement , RT IJ central line  placed , develop fever shaking chills , blood culture positive for serratia marcescens , start on cefepime .the patient  become hypoxic and hypotensive placed on full ventilatory support and Vasopressin , levophed and Dobutamine ,S/P blood transfusion on meropenem and vancomycin ,   , on and  off pressors , occasional agitation on Precedex .S/P IR cholecystostomy tube drainage placement in the RT upper Quadrant , resume anticoagulation chest x ray noted C-PAP trail lasted only for 2 hrs , new RT SC line and D/C RT IJ line , RT pig tail cathter has been removed , tolerating C-PAP trial placed on trach. collar 50% FI02 GI consultant noted on NG tube feeding as tolerated , develop AF RVR S/P  bolus Amiodarone  back to regular sinus Rhythm , Flat Affect depressed , back on tube feeding Vital AF at 60 cc/ hr .still intermittent abdominal pain , no fever saturation is accepted  back on full ventilatory support ,   on methimazole for hyperthyroidism ,  condition is the same ,still C/O Abdominal pain , white count is improving , no chest pain or SOB ,  .placed on Reglan 10 mg TID for gastric motility , depressed , withdrawn. , S/P  mesenteric angiogram , unable to stent SMA S/P 3 units of blood transfusion   RT IJ in place reassessed for stent in the SMA by vascular,  dark stool stable H/H ,surgical opinion for possible Lap cholecystectomy. over night events noted develop respiratory distress, , rectal bleeding, require intubation placed on full ventilatory support , FI02 is 50% also became hemianosmically unstable require triple pressor support with levophed , vasopressin and norsynephrine, pan culture placed  on Vancomycin IV and PO  and Zosyn, sedated with propofol kept NPO , new central line RT and left IJ cathter placed . Diflucan Added .fever of 38.5 weaned off levophed and norsynephrine  weaned  off vasopressin , sedated on propofol , all culture are negative, resume tube feeding , BP fluctuate lethargic , white count is improving      (2021 16:57)    PAST MEDICAL & SURGICAL HISTORY:  CHF (congestive heart failure)    CAD (coronary artery disease)  Depression    Pleural effusion    History of 2019 novel coronavirus disease (COVID-19)  2020    Hemorrhoids    Bleeding hemorrhoids    Peripheral arterial disease    Claudication    BPH with urinary obstruction    ACC/AHA stage D systolic heart failure  Anticoagulation goal of INR 2.0 to 2.5    Falls    Clavicle fracture    CKD (chronic kidney disease), stage III    Iron deficiency anemia    H/O epistaxis    Vertigo    GI bleed    S/P TVR (tricuspid valve repair)    S/P ventricular assist device    S/P endoscopy    OBJECTIVE:  ICU Vital Signs Last 24 Hrs  T(C): 38.2 (2021 10:00), Max: 38.5 (2021 12:00)  T(F): 100.8 (2021 10:00), Max: 101.3 (2021 12:00)  HR: 65 (2021 10:00) (61 - 69)  BP: --  BP(mean): --  ABP: 105/67 (2021 10:00) (90/54 - 113/64)  ABP(mean): 77 (2021 10:00) (63 - 77)  RR: 20 (2021 10:00) (19 - 35)  SpO2: 99% (2021 10:00) (96% - 100%)       @ 07: @ 07:00  --------------------------------------------------------  IN: 2693.9 mL / OUT: 1415 mL / NET: 1278.9 mL     @ 07: @ 10:49  --------------------------------------------------------  IN: 420.8 mL / OUT: 115 mL / NET: 305.8 mL  PHYSICAL EXAM: Daily   Elderly male intubated on full ventilatory support FI02 is 40% , off vasopressin  support   Daily Weight in k.4 (2021 00:00)  HEENT:     + NCAT  + EOMI  - Conjuctival edema   - Icterus   - Thrush   - ETT  + NGT/OGT  Neck:         + FROM  RT IJ , LT IJ  lines JVD  - Nodes - Masses + Mid-line trachea - Tracheostomy  Chest:            normal A-P diameter    Lungs:          + CTA   + Rhonchi    - Rales    - Wheezing + Decreased  LT BS   - Dullness R L  Cardiac:       + S1 + S2    + RRR   - Irregular   - S3  - S4    - Murmurs   - Rub   - Hamman’s sign   Abdomen:    + BS  + Soft + Non-tender - Distended - Organomegaly - PEG .cholecystostomy tube in place  Extremities:   - Cyanosis U/L   - Clubbing  U/L  + LE/UE Edema   + Capillary refill    + Pulses   Neuro:        - Awake   -  Alert   - Confused   - Lethargic   + Sedated  + Generalized Weakness  Skin:        - Rashes    - Erythema   + Normal incisions   + IV sites intact          HOSPITAL MEDICATIONS: All medications reviewed and analyzed  MEDICATIONS  (STANDING):  amiodarone    Tablet 200 milliGRAM(s) Oral daily  chlorhexidine 0.12% Liquid 15 milliLiter(s) Oral Mucosa every 12 hours  chlorhexidine 2% Cloths 1 Application(s) Topical <User Schedule>  dexmedetomidine Infusion 0.5 MICROgram(s)/kG/Hr (9.81 mL/Hr) IV Continuous <Continuous>  dextrose 50% Injectable 50 milliLiter(s) IV Push every 15 minutes  heparin  Infusion 400 Unit(s)/Hr (12.5 mL/Hr) IV Continuous <Continuous>  Hydromorphone  Injectable 0.5 milliGRAM(s) IV Push once  insulin lispro (ADMELOG) corrective regimen sliding scale   SubCutaneous every 6 hours  pantoprazole  Injectable 40 milliGRAM(s) IV Push every 12 hours  piperacillin/tazobactam IVPB.. 3.375 Gram(s) IV Intermittent every 8 hours  propofol Infusion 20 MICROgram(s)/kG/Min (9.42 mL/Hr) IV Continuous <Continuous>  sodium chloride 0.9% lock flush 3 milliLiter(s) IV Push every 8 hours  sodium chloride 0.9%. 1000 milliLiter(s) (10 mL/Hr) IV Continuous <Continuous>    MEDICATIONS  (PRN):  acetaminophen    Suspension .. 650 milliGRAM(s) Oral every 6 hours PRN Temp greater or equal to 38C (100.4F)    LABS: All Lab data reviewed and analyzed                                    10.3   17.63 )-----------( 114      ( 29 Sep 2021 00:49 )             32.3        144  |  111<H>  |  23  ----------------------------<  157<H>  4.0   |  19<L>  |  0.48<L>    Ca    10.8<H>      29 Sep 2021 00:49  Phos  3.8       Mg     1.7         TPro  7.6  /  Alb  3.8  /  TBili  1.8<H>  /  DBili  x   /  AST  14  /  ALT  21  /  AlkPhos  167<H>      Ca    10.7<H>      28 Sep 2021 00:24  Phos  1.8       Mg     2.0         TPro  7.5  /  Alb  4.1  /  TBili  2.1<H>  /  DBili  x   /  AST  19  /  ALT  23  /  AlkPhos  136<H>                                                                                                                                                                PTT - ( 2021 04:52 )  PTT:45.2 sec LIVER FUNCTIONS - ( 2021 00:42 )  Alb: 3.4 g/dL / Pro: 6.7 g/dL / ALK PHOS: 213 U/L / ALT: 15 U/L / AST: 24 U/L / GGT: x           RADIOLOGY: - Reviewed and analyzed RT Pig tail cathter  , LVAD HM2, CT scan of abdomen reviewed result noted

## 2021-09-29 NOTE — PROGRESS NOTE ADULT - SUBJECTIVE AND OBJECTIVE BOX
RICKY HAMPTON  MRN-[id number]  Patient is a 66y old  Male who presents with a chief complaint of Anemia, Supratherapeutic INR, Dark Stools (28 Sep 2021 18:56)    HPI:  65M PMH ACC/AHA stage D HF due to NICM HM2 LVAD , TV annuloplasty ring 17 as destination therapy due to severe peripheral artery disease with significant stenosis  SIADH, Depression, CKD-3 with hyperkalemia, past E. coli UTIs, driveline drainage (21) and COVID-19 (back in 2020)  He was recently seen in clinic where he complained of abdominal pain and dark stools w constipation back in May. He presents to [facility] ER today weakness and fatigue, moderate and + Black stools for three days, on coumadin secondary to warfarin use in the setting of an LVAD. Patient has required transfusions for GIB in the past. Mostly recently back in 2021 pt had anemia with dark stools. No interventions was done at that time. However Last Endoscopy was done in 2020 (negative). Today labs show patient is anemic with H/H of 4.5/16.3,. INR is 8.84 MAP in the 90s, Temp 35.1. He denies any chest pain, shortness of breath, dizziness, abd pain, nausea or vomiting.       (2021 16:57)      Surgery/Hospital Course:   admit for melena w/ anemia, INR 8.84   6/15 Capsul study (+) for small bowel bleed, balloon endoscopy (old blood in prox ileum); post EGD - septic w/ L opacity, re-intubated for concern for aspiration, TTE (Mod MR, decrease biV w/ interventricular septum boweing towards R)   bronch    +C Diff    CT C/A/P: Fluid filled colon which may be 2/2 rapid transit. Small bilateral pleffs with associates. Compressive atelectasis New ISABELLE & LLL  parenchymal opacities, suspicious for pneumonia. 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    #8 Shiley trach at bedside    LVAD speed increased to 9200   Bronch   TC since . Patient transferred to SDU.    INR today 2.64.  H&H 7.3/24 this AM.  Will repeat CBC at noon, and will send stool guaiac Patient with persistent abdominal tenderness, rate of tube feeds decreased.  No nausea/vomiting.     INR today 2.4. H&H 9.1/28.6 low flow overnight /N&V, refusing Tube feeds on D5 1/2 normal  @50 cc/hr   INR 2.69  H&H 7.7/.1 refusing Tube feeds on D5 1/2 normal  @50 cc/hr. This am + BM Melena Dr Oneill HF  aware- PRBC x1  GI team consulted -  NPO  plan on study in am-  D/w Dr Cadet Patient  to return to CTU for further management; 1PRBCS    Post op INR 2.2 today.  No bleeding. BC + for SM.  Pt is hypotensive requiring pressor and inotropic support.  ID follow up today on Cefepime will follow.   R PTC for PTX    CT C/A/P: sub q emphysema in R chest wall, GGO RUL, small ascites CTH negative; Abd US: GB thickening, pericholecystic fluid     Perchole drain in place continues to drain total output overnight 133.  Fever today 38.8    duplex LE negative    Patient with persistent abdominal pain, refusing tube feeds and medications, Psych consulted   CTA A/P ordered to r/o mesenteric ischemia 2/2 persistent anorexia, nausea, vomiting. Revealed:  Evaluation of the mesenteric vessels is limited by streak artifact from LVAD. There appears to be severe stenosis of the proximal SMA; abdominal mesenteric doppler is recommended for further evaluation. 2.  No small bowel findings to suggest acute mesenteric ischemia. 3.  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  8/15: Cultures resulted BC 1/2 +GPC in clusters, SC enterobacter; mesenteric duplex: borderline stenosis of proximal SMA  : CT C/A/P noncon: Nondular opacities in R lung apex w/cavitation, abd nl  :  Continue current care, treatment of thyrotoxicosis with medications as per endocrine, d/c ABX as per team.    RUQ sono: Contracted gallbladder with cholecystostomy tube in place.  9/10 failed SMA stent, L fem PSA, s/p 3U PRCB   CTA Abd/Pelvis L RP hematoma    Trach decannulated    intubated fro resp distress for increased WOB, LL pneumonia; CT C/A/P: progressive ISABELLE opacities and L consolidations, multifocal pneumonia       Today:    REVIEW OF SYSTEMS:  Unable to obtain, pt intubated and sedated     ICU Vital Signs Last 24 Hrs  T(C): 36.6 (29 Sep 2021 08:00), Max: 37.5 (29 Sep 2021 00:00)  T(F): 97.9 (29 Sep 2021 08:00), Max: 99.5 (29 Sep 2021 00:00)  HR: 86 (29 Sep 2021 08:00) (72 - 91)  BP: --  BP(mean): --  ABP: 122/86 (29 Sep 2021 08:00) (99/72 - 144/121)  ABP(mean): 103 (29 Sep 2021 08:00) (81 - 130)  RR: 0 (29 Sep 2021 08:00) (0 - 39)  SpO2: 100% (29 Sep 2021 08:00) (98% - 100%)      Physical Exam:  Gen:  intubated   CNS:  sedated   Neck: no JVD, gauze over trach wound  RES : coarse breath sounds, no wheezing    CVS: +LVAD hum   Abd: Soft. Sybil drain with bilious fluid. Positive BS throughout. Mild RUQ abdominal tenderness by perc sybil.  Skin: No rash, erythema, cyanosis.  Vasc: Warm and well-perfused.  Ext:  no edema    ============================I/O===========================   I&O's Detail    28 Sep 2021 07:01  -  29 Sep 2021 07:00  --------------------------------------------------------  IN:    Dexmedetomidine: 485.1 mL    Lidocaine: 7.6 mL    Miscellaneous Tube Feedin mL    Propofol: 185 mL    sodium chloride 0.9%: 240 mL  Total IN: 1127.7 mL    OUT:    Drain (mL): 425 mL    Indwelling Catheter - Urethral (mL): 815 mL    Insulin: 0 mL    Norepinephrine: 0 mL    Vasopressin: 0 mL    Voided (mL): 200 mL  Total OUT: 1440 mL    Total NET: -312.3 mL      29 Sep 2021 07:01  -  29 Sep 2021 08:26  --------------------------------------------------------  IN:    Dexmedetomidine: 23.1 mL    Miscellaneous Tube Feeding: 15 mL    Propofol: 3.7 mL    sodium chloride 0.9%: 10 mL  Total IN: 51.8 mL    OUT:  Total OUT: 0 mL    Total NET: 51.8 mL        ============================ LABS =========================                        10.3   17.63 )-----------( 114      ( 29 Sep 2021 00:49 )             32.3         144  |  111<H>  |  23  ----------------------------<  157<H>  4.0   |  19<L>  |  0.48<L>    Ca    10.8<H>      29 Sep 2021 00:49  Phos  3.8       Mg     1.7         TPro  7.6  /  Alb  3.8  /  TBili  1.8<H>  /  DBili  x   /  AST  14  /  ALT  21  /  AlkPhos  167<H>      LIVER FUNCTIONS - ( 29 Sep 2021 00:49 )  Alb: 3.8 g/dL / Pro: 7.6 g/dL / ALK PHOS: 167 U/L / ALT: 21 U/L / AST: 14 U/L / GGT: x             ABG - ( 29 Sep 2021 00:43 )  pH, Arterial: 7.43  pH, Blood: x     /  pCO2: 32    /  pO2: 192   / HCO3: 21    / Base Excess: -2.5  /  SaO2: 99.6                ======================Micro/Rad/Cardio=================  Culture: Reviewed   CXR: Reviewed  Echo:Reviewed  ======================================================  PAST MEDICAL & SURGICAL HISTORY:  CHF (congestive heart failure)    CAD (coronary artery disease)    Depression    Pleural effusion    History of  novel coronavirus disease (COVID-19)  2020    Hemorrhoids    Bleeding hemorrhoids    Peripheral arterial disease    Claudication    BPH with urinary obstruction    ACC/AHA stage D systolic heart failure    Anticoagulation goal of INR 2.0 to 2.5    Falls    Clavicle fracture    CKD (chronic kidney disease), stage III    Iron deficiency anemia    H/O epistaxis    Vertigo    GI bleed    S/P TVR (tricuspid valve repair)    S/P ventricular assist device    S/P endoscopy      ====================ASSESSMENT ==============  Stage D Nonischemic Cardiomyopathy, Status Post HM2 on 2017    Cardiogenic shock  Hemodynamic instability   Acute hypoxemic respiratory failure s/p trach , decannulated on ; Reintubated on    GI bleed , Status Post Enteroscopy   Anemia, in setting of melena   Chronic Kidney Disease  Stress hyperglycemia   C.diff positive on    Hypovolemic shock  Septic shock  Leukocytosis  GB thickening/percholecystic s/p perc choley by IT   SMA stenosis  Serratia/citrobacter pneumonia   Stenotrophomonas pneumonia   Enterobacter pneumonia   Nasuea/vomiting  Deconditioning    Plan:  ====================== NEUROLOGY=====================  Sedated with IV Precedex and Propofol  to maintain vent synchrony   Deconditioned, PT as tolerated  Tylenol PRN for analgesia     acetaminophen    Suspension .. 650 milliGRAM(s) Enteral Tube every 6 hours PRN Mild Pain (1 - 3)  dexMEDEtomidine Infusion 1.5 MICROgram(s)/kG/Hr (23.1 mL/Hr) IV Continuous <Continuous>  propofol Infusion 13.528 MICROgram(s)/kG/Min (5 mL/Hr) IV Continuous <Continuous>    ==================== RESPIRATORY======================  Acute hypoxemic respiratory failure s/p #8 shiley trach on ; decannulated on ; Reintubated on    Continue close monitoring of respiratory rate and breathing pattern, following of ABG’s with A-line monitoring, continuous pulse oximetry monitoring.   Planning for re-trach     Mechanical Ventilation:  Mode: AC/ CMV (Assist Control/ Continuous Mandatory Ventilation)  RR (machine): 20  TV (machine): 500  FiO2: 40  PEEP: 5  ITime: 1  MAP: 11  PIP: 22      ====================CARDIOVASCULAR==================  Stage D Nonischemic Cardiomyopathy, Status Post HM2 on 2017; LVAD settings 9200, flow 4.6  TTE : EF 12%, mild-mod AR, mild-mod TR, severe global LV systolic dysfxn, RV enlargement with decreased fxn  Propranolol for rate control of HR 2/2 Hyperthyroidism   Afterload reduction with Hydralazine     hydrALAZINE 25 milliGRAM(s) Oral every 8 hours  propranolol 40 milliGRAM(s) Oral every 8 hours    ===================HEMATOLOGIC/ONC ===================  CTA A/P on  +L RP hematoma   Acute blood loss anemia, monitor H&H/Plts    Holding coumadin and ASA given recurrent GI bleeding, continue monitoring LDH     ===================== RENAL =========================  Continue monitoring urine output, I&OS, BUN/Cr   Euvolemic, even fluid balance, currently off diuretics.    Replete lytes PRN. Keep K> 4 and Mg >2     ==================== GASTROINTESTINAL===================  S/p cholecystostomy tube placed on  with IR  Tolerating trickel feeds, Vital 1.5 @ 10cc/hr   CTA A/P : Evaluation of the mesenteric vessels is limited by streak artifact from LVAD. There appears to be severe stenosis of the proximal SMA; abdominal mesenteric doppler is recommended for further evaluation. 2.  No small bowel findings to suggest acute mesenteric ischemia. 3.  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  Mesenteric duplex on 8/15 borderline stenosis of proximal SMA, s/p failed SMA stent, L fem RP hematoma on 9/10; No intervention for cholecystostomy tube, PEJ  Reglan for gut motility  Zofran PRN nausea/vomiting    multivitamin 1 Tablet(s) Oral daily  GI prophylaxis, pantoprazole  Injectable 40 milliGRAM(s) IV Push every 12 hours  simethicone 80 milliGRAM(s) Chew every 8 hours PRN Gas  sodium chloride 0.9%. 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; Will f/u with Endocrine re: management of hyperthyroidism in setting of elevated T3  Per endocrine      - Thyroid US when trach is out      - c/w Methimazole       - Titrate stress dose steroids     hydrocortisone sodium succinate Injectable 25 milliGRAM(s) IV Push every 8 hours  insulin lispro (ADMELOG) corrective regimen sliding scale   SubCutaneous every 6 hours  methimazole 20 milliGRAM(s) Oral every 8 hours    ========================INFECTIOUS DISEASE================  Afebrile, WBC downtrending 28.31->17.63   Continue trending WBC and monitoring fever curve   CT C/A/P : progressive ISABELLE opacities and L consolidations, multifocal pneumonia      Patient requires continuous monitoring with bedside rhythm monitoring, pulse ox monitoring, and intermittent blood gas analysis. Care plan discussed with ICU care team. Patient remained critical and at risk for life threatening decompensation.     By signing my name below, I, Agnieszka Cherry, attest that this documentation has been prepared under the direction and in the presence of CLAUDIA Bey   Electronically signed: Cesia Shaffer, -29-21 @ 08:26    I, Luciano Ayala, personally performed the services described in this documentation. all medical record entries made by the luisibe were at my direction and in my presence. I have reviewed the chart and agree that the record reflects my personal performance and is accurate and complete  Electronically signed: CLAUDIA Bey        RICKY HAMPTON  MRN-[id number]  Patient is a 66y old  Male who presents with a chief complaint of Anemia, Supratherapeutic INR, Dark Stools (28 Sep 2021 18:56)    HPI:  65M PMH ACC/AHA stage D HF due to NICM HM2 LVAD , TV annuloplasty ring 17 as destination therapy due to severe peripheral artery disease with significant stenosis  SIADH, Depression, CKD-3 with hyperkalemia, past E. coli UTIs, driveline drainage (21) and COVID-19 (back in 2020)  He was recently seen in clinic where he complained of abdominal pain and dark stools w constipation back in May. He presents to [facility] ER today weakness and fatigue, moderate and + Black stools for three days, on coumadin secondary to warfarin use in the setting of an LVAD. Patient has required transfusions for GIB in the past. Mostly recently back in 2021 pt had anemia with dark stools. No interventions was done at that time. However Last Endoscopy was done in 2020 (negative). Today labs show patient is anemic with H/H of 4.5/16.3,. INR is 8.84 MAP in the 90s, Temp 35.1. He denies any chest pain, shortness of breath, dizziness, abd pain, nausea or vomiting.       (2021 16:57)      Surgery/Hospital Course:   admit for melena w/ anemia, INR 8.84   6/15 Capsul study (+) for small bowel bleed, balloon endoscopy (old blood in prox ileum); post EGD - septic w/ L opacity, re-intubated for concern for aspiration, TTE (Mod MR, decrease biV w/ interventricular septum boweing towards R)   bronch    +C Diff    CT C/A/P: Fluid filled colon which may be 2/2 rapid transit. Small bilateral pleffs with associates. Compressive atelectasis New ISABELLE & LLL  parenchymal opacities, suspicious for pneumonia. 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    #8 Shiley trach at bedside    LVAD speed increased to 9200   Bronch   TC since . Patient transferred to SDU.    INR today 2.64.  H&H 7.3/24 this AM.  Will repeat CBC at noon, and will send stool guaiac Patient with persistent abdominal tenderness, rate of tube feeds decreased.  No nausea/vomiting.     INR today 2.4. H&H 9.1/28.6 low flow overnight /N&V, refusing Tube feeds on D5 1/2 normal  @50 cc/hr   INR 2.69  H&H 7.7/.1 refusing Tube feeds on D5 1/2 normal  @50 cc/hr. This am + BM Melena Dr Oneill HF  aware- PRBC x1  GI team consulted -  NPO  plan on study in am-  D/w Dr Cadet Patient  to return to CTU for further management; 1PRBCS    Post op INR 2.2 today.  No bleeding. BC + for SM.  Pt is hypotensive requiring pressor and inotropic support.  ID follow up today on Cefepime will follow.   R PTC for PTX    CT C/A/P: sub q emphysema in R chest wall, GGO RUL, small ascites CTH negative; Abd US: GB thickening, pericholecystic fluid     Perchole drain in place continues to drain total output overnight 133.  Fever today 38.8    duplex LE negative    Patient with persistent abdominal pain, refusing tube feeds and medications, Psych consulted   CTA A/P ordered to r/o mesenteric ischemia 2/2 persistent anorexia, nausea, vomiting. Revealed:  Evaluation of the mesenteric vessels is limited by streak artifact from LVAD. There appears to be severe stenosis of the proximal SMA; abdominal mesenteric doppler is recommended for further evaluation. 2.  No small bowel findings to suggest acute mesenteric ischemia. 3.  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  8/15: Cultures resulted BC 1/2 +GPC in clusters, SC enterobacter; mesenteric duplex: borderline stenosis of proximal SMA  : CT C/A/P noncon: Nondular opacities in R lung apex w/cavitation, abd nl  :  Continue current care, treatment of thyrotoxicosis with medications as per endocrine, d/c ABX as per team.    RUQ sono: Contracted gallbladder with cholecystostomy tube in place.  9/10 failed SMA stent, L fem PSA, s/p 3U PRCB   CTA Abd/Pelvis L RP hematoma    Trach decannulated    intubated fro resp distress for increased WOB, LL pneumonia; CT C/A/P: progressive ISABELLE opacities and L consolidations, multifocal pneumonia       Today:  Plan to d/c IV Propofol, wean sedation with IV Precedex as tolerated. Tolerating TFs, Vital 1.5, increased to 15cc/hr this AM, will increase to 20cc/hr as tolerated. Persistent hypertension, continue afterload reduction with Hydralazine.     REVIEW OF SYSTEMS:  Unable to obtain, pt intubated and sedated     ICU Vital Signs Last 24 Hrs  T(C): 36.6 (29 Sep 2021 08:00), Max: 37.5 (29 Sep 2021 00:00)  T(F): 97.9 (29 Sep 2021 08:00), Max: 99.5 (29 Sep 2021 00:00)  HR: 86 (29 Sep 2021 08:00) (72 - 91)  BP: --  BP(mean): --  ABP: 122/86 (29 Sep 2021 08:00) (99/72 - 144/121)  ABP(mean): 103 (29 Sep 2021 08:00) (81 - 130)  RR: 0 (29 Sep 2021 08:00) (0 - 39)  SpO2: 100% (29 Sep 2021 08:00) (98% - 100%)      Physical Exam:  Gen:  intubated   CNS:  sedated   Neck: no JVD, gauze over trach wound  RES : coarse breath sounds, no wheezing    CVS: +LVAD hum   Abd: Soft. Sybil drain with bilious fluid. Positive BS throughout. Mild RUQ abdominal tenderness by perc sybil.  Skin: No rash, erythema, cyanosis.  Vasc: Warm and well-perfused.  Ext:  no edema    ============================I/O===========================   I&O's Detail    28 Sep 2021 07:01  -  29 Sep 2021 07:00  --------------------------------------------------------  IN:    Dexmedetomidine: 485.1 mL    Lidocaine: 7.6 mL    Miscellaneous Tube Feedin mL    Propofol: 185 mL    sodium chloride 0.9%: 240 mL  Total IN: 1127.7 mL    OUT:    Drain (mL): 425 mL    Indwelling Catheter - Urethral (mL): 815 mL    Insulin: 0 mL    Norepinephrine: 0 mL    Vasopressin: 0 mL    Voided (mL): 200 mL  Total OUT: 1440 mL    Total NET: -312.3 mL      29 Sep 2021 07:  -  29 Sep 2021 08:26  --------------------------------------------------------  IN:    Dexmedetomidine: 23.1 mL    Miscellaneous Tube Feeding: 15 mL    Propofol: 3.7 mL    sodium chloride 0.9%: 10 mL  Total IN: 51.8 mL    OUT:  Total OUT: 0 mL    Total NET: 51.8 mL        ============================ LABS =========================                        10.3   17.63 )-----------( 114      ( 29 Sep 2021 00:49 )             32.3         144  |  111<H>  |  23  ----------------------------<  157<H>  4.0   |  19<L>  |  0.48<L>    Ca    10.8<H>      29 Sep 2021 00:49  Phos  3.8       Mg     1.7         TPro  7.6  /  Alb  3.8  /  TBili  1.8<H>  /  DBili  x   /  AST  14  /  ALT  21  /  AlkPhos  167<H>      LIVER FUNCTIONS - ( 29 Sep 2021 00:49 )  Alb: 3.8 g/dL / Pro: 7.6 g/dL / ALK PHOS: 167 U/L / ALT: 21 U/L / AST: 14 U/L / GGT: x             ABG - ( 29 Sep 2021 00:43 )  pH, Arterial: 7.43  pH, Blood: x     /  pCO2: 32    /  pO2: 192   / HCO3: 21    / Base Excess: -2.5  /  SaO2: 99.6                ======================Micro/Rad/Cardio=================  Culture: Reviewed   CXR: Reviewed  Echo:Reviewed  ======================================================  PAST MEDICAL & SURGICAL HISTORY:  CHF (congestive heart failure)    CAD (coronary artery disease)    Depression    Pleural effusion    History of  novel coronavirus disease (COVID-19)  2020    Hemorrhoids    Bleeding hemorrhoids    Peripheral arterial disease    Claudication    BPH with urinary obstruction    ACC/AHA stage D systolic heart failure    Anticoagulation goal of INR 2.0 to 2.5    Falls    Clavicle fracture    CKD (chronic kidney disease), stage III    Iron deficiency anemia    H/O epistaxis    Vertigo    GI bleed    S/P TVR (tricuspid valve repair)    S/P ventricular assist device    S/P endoscopy      ====================ASSESSMENT ==============  Stage D Nonischemic Cardiomyopathy, Status Post HM2 on 2017    Cardiogenic shock  Hemodynamic instability   Acute hypoxemic respiratory failure s/p trach , decannulated on ; Reintubated on    GI bleed , Status Post Enteroscopy   Anemia, in setting of melena   Chronic Kidney Disease  Stress hyperglycemia   C.diff positive on    Hypovolemic shock  Septic shock  Leukocytosis  GB thickening/percholecystic s/p perc choley by IT   SMA stenosis  Serratia/citrobacter pneumonia   Stenotrophomonas pneumonia   Enterobacter pneumonia   Nasuea/vomiting  Deconditioning    Plan:  ====================== NEUROLOGY=====================  Plan to d/c IV Propofol, wean sedation with IV Precedex as tolerated.   Deconditioned, PT as tolerated  Tylenol PRN for analgesia     acetaminophen    Suspension .. 650 milliGRAM(s) Enteral Tube every 6 hours PRN Mild Pain (1 - 3)  dexMEDEtomidine Infusion 1.5 MICROgram(s)/kG/Hr (23.1 mL/Hr) IV Continuous <Continuous>  propofol Infusion 13.528 MICROgram(s)/kG/Min (5 mL/Hr) IV Continuous <Continuous>    ==================== RESPIRATORY======================  Acute hypoxemic respiratory failure s/p #8 shiley trach on ; decannulated on ; Reintubated on    Continue close monitoring of respiratory rate and breathing pattern, following of ABG’s with A-line monitoring, continuous pulse oximetry monitoring.   Planning for re-trach     Mechanical Ventilation:  Mode: AC/ CMV (Assist Control/ Continuous Mandatory Ventilation)  RR (machine): 20  TV (machine): 500  FiO2: 40  PEEP: 5  ITime: 1  MAP: 11  PIP: 22      ====================CARDIOVASCULAR==================  Stage D Nonischemic Cardiomyopathy, Status Post HM2 on 2017; LVAD settings 9200, flow 4.6  TTE : EF 12%, mild-mod AR, mild-mod TR, severe global LV systolic dysfxn, RV enlargement with decreased fxn  Propranolol for rate control of HR 2/2 Hyperthyroidism   Persistent hypertension, continue afterload reduction with Hydralazine    hydrALAZINE 25 milliGRAM(s) Oral every 8 hours  propranolol 40 milliGRAM(s) Oral every 8 hours    ===================HEMATOLOGIC/ONC ===================  CTA A/P on  +L RP hematoma   Acute blood loss anemia, monitor H&H/Plts    Holding coumadin and ASA given recurrent GI bleeding, continue monitoring LDH     ===================== RENAL =========================  Continue monitoring urine output, I&OS, BUN/Cr   Euvolemic, even fluid balance, currently off diuretics.    Replete lytes PRN. Keep K> 4 and Mg >2     ==================== GASTROINTESTINAL===================  S/p cholecystostomy tube placed on  with IR  Tolerating TFs, Vital 1.5, increased to 15cc/hr this AM, will increase to 20cc/hr as tolerated.   CTA A/P : Evaluation of the mesenteric vessels is limited by streak artifact from LVAD. There appears to be severe stenosis of the proximal SMA; abdominal mesenteric doppler is recommended for further evaluation. 2.  No small bowel findings to suggest acute mesenteric ischemia. 3.  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  Mesenteric duplex on 8/15 borderline stenosis of proximal SMA, s/p failed SMA stent, L fem RP hematoma on 9/10; No intervention for cholecystostomy tube, PEJ  Reglan for gut motility  Zofran PRN nausea/vomiting    multivitamin 1 Tablet(s) Oral daily  GI prophylaxis, pantoprazole  Injectable 40 milliGRAM(s) IV Push every 12 hours  simethicone 80 milliGRAM(s) Chew every 8 hours PRN Gas  sodium chloride 0.9%. 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; Will f/u with Endocrine re: management of hyperthyroidism in setting of elevated T3  Per endocrine      - Thyroid US when trach is out      - c/w Methimazole       - Titrate stress dose steroids     hydrocortisone sodium succinate Injectable 25 milliGRAM(s) IV Push every 8 hours  insulin lispro (ADMELOG) corrective regimen sliding scale   SubCutaneous every 6 hours  methimazole 20 milliGRAM(s) Oral every 8 hours    ========================INFECTIOUS DISEASE================  Afebrile, WBC downtrending 28.31->17.63   Continue trending WBC and monitoring fever curve   CT C/A/P : progressive ISABELLE opacities and L consolidations, multifocal pneumonia        Patient requires continuous monitoring with bedside rhythm monitoring, pulse ox monitoring, and intermittent blood gas analysis. Care plan discussed with ICU care team. Patient remained critical and at risk for life threatening decompensation.     By signing my name below, I, Agnieszka Cherry, attest that this documentation has been prepared under the direction and in the presence of CLAUDIA Bey   Electronically signed: Romel Shaffer, 21 @ 08:26    I, Luciano Ayala, personally performed the services described in this documentation. all medical record entries made by the romel were at my direction and in my presence. I have reviewed the chart and agree that the record reflects my personal performance and is accurate and complete  Electronically signed: CLAUDIA Bey        RIKCY HAMPTON  MRN-[id number]  Patient is a 66y old  Male who presents with a chief complaint of Anemia, Supratherapeutic INR, Dark Stools (28 Sep 2021 18:56)    HPI:  65M PMH ACC/AHA stage D HF due to NICM HM2 LVAD , TV annuloplasty ring 17 as destination therapy due to severe peripheral artery disease with significant stenosis  SIADH, Depression, CKD-3 with hyperkalemia, past E. coli UTIs, driveline drainage (21) and COVID-19 (back in 2020)  He was recently seen in clinic where he complained of abdominal pain and dark stools w constipation back in May. He presents to [facility] ER today weakness and fatigue, moderate and + Black stools for three days, on coumadin secondary to warfarin use in the setting of an LVAD. Patient has required transfusions for GIB in the past. Mostly recently back in 2021 pt had anemia with dark stools. No interventions was done at that time. However Last Endoscopy was done in 2020 (negative). Today labs show patient is anemic with H/H of 4.5/16.3,. INR is 8.84 MAP in the 90s, Temp 35.1. He denies any chest pain, shortness of breath, dizziness, abd pain, nausea or vomiting.       (2021 16:57)      Surgery/Hospital Course:   admit for melena w/ anemia, INR 8.84   6/15 Capsul study (+) for small bowel bleed, balloon endoscopy (old blood in prox ileum); post EGD - septic w/ L opacity, re-intubated for concern for aspiration, TTE (Mod MR, decrease biV w/ interventricular septum boweing towards R)   bronch    +C Diff    CT C/A/P: Fluid filled colon which may be 2/2 rapid transit. Small bilateral pleffs with associates. Compressive atelectasis New ISABELLE & LLL  parenchymal opacities, suspicious for pneumonia. 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    #8 Shiley trach at bedside    LVAD speed increased to 9200   Bronch   TC since . Patient transferred to SDU.    INR today 2.64.  H&H 7.3/24 this AM.  Will repeat CBC at noon, and will send stool guaiac Patient with persistent abdominal tenderness, rate of tube feeds decreased.  No nausea/vomiting.     INR today 2.4. H&H 9.1/28.6 low flow overnight /N&V, refusing Tube feeds on D5 1/2 normal  @50 cc/hr   INR 2.69  H&H 7.7/.1 refusing Tube feeds on D5 1/2 normal  @50 cc/hr. This am + BM Melena Dr Oneill HF  aware- PRBC x1  GI team consulted -  NPO  plan on study in am-  D/w Dr Cadet Patient  to return to CTU for further management; 1PRBCS    Post op INR 2.2 today.  No bleeding. BC + for SM.  Pt is hypotensive requiring pressor and inotropic support.  ID follow up today on Cefepime will follow.   R PTC for PTX    CT C/A/P: sub q emphysema in R chest wall, GGO RUL, small ascites CTH negative; Abd US: GB thickening, pericholecystic fluid     Perchole drain in place continues to drain total output overnight 133.  Fever today 38.8    duplex LE negative    Patient with persistent abdominal pain, refusing tube feeds and medications, Psych consulted   CTA A/P ordered to r/o mesenteric ischemia 2/2 persistent anorexia, nausea, vomiting. Revealed:  Evaluation of the mesenteric vessels is limited by streak artifact from LVAD. There appears to be severe stenosis of the proximal SMA; abdominal mesenteric doppler is recommended for further evaluation. 2.  No small bowel findings to suggest acute mesenteric ischemia. 3.  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  8/15: Cultures resulted BC 1/2 +GPC in clusters, SC enterobacter; mesenteric duplex: borderline stenosis of proximal SMA  : CT C/A/P noncon: Nondular opacities in R lung apex w/cavitation, abd nl  :  Continue current care, treatment of thyrotoxicosis with medications as per endocrine, d/c ABX as per team.    RUQ sono: Contracted gallbladder with cholecystostomy tube in place.  9/10 failed SMA stent, L fem PSA, s/p 3U PRCB   CTA Abd/Pelvis L RP hematoma    Trach decannulated    intubated fro resp distress for increased WOB, LL pneumonia; CT C/A/P: progressive ISABELLE opacities and L consolidations, multifocal pneumonia     Today:  Plan to d/c IV Propofol, wean sedation with IV Precedex as tolerated. Tolerating TFs, Vital 1.5, increased to 20cc/hr this AM, will increase as tolerated. Persistent hypertension, continue afterload reduction with Hydralazine.     REVIEW OF SYSTEMS:  Unable to obtain, pt intubated and sedated     ICU Vital Signs Last 24 Hrs  T(C): 36.6 (29 Sep 2021 08:00), Max: 37.5 (29 Sep 2021 00:00)  T(F): 97.9 (29 Sep 2021 08:00), Max: 99.5 (29 Sep 2021 00:00)  HR: 86 (29 Sep 2021 08:00) (72 - 91)  BP: --  BP(mean): --  ABP: 122/86 (29 Sep 2021 08:00) (99/72 - 144/121)  ABP(mean): 103 (29 Sep 2021 08:00) (81 - 130)  RR: 0 (29 Sep 2021 08:00) (0 - 39)  SpO2: 100% (29 Sep 2021 08:00) (98% - 100%)      Physical Exam:  Gen:  intubated   CNS: moves all extremities to command on low dose sedation   Neck: no JVD, gauze over trach wound  RES : coarse breath sounds, no wheezing    CVS: +LVAD hum   Abd: Soft. Sybil drain with bilious fluid. Positive BS throughout. Mild RUQ abdominal tenderness by perc sybil.  Skin: No rash, erythema, cyanosis.  Vasc: Warm and well-perfused.  Ext:  no edema    ============================I/O===========================   I&O's Detail    28 Sep 2021 07:01  -  29 Sep 2021 07:00  --------------------------------------------------------  IN:    Dexmedetomidine: 485.1 mL    Lidocaine: 7.6 mL    Miscellaneous Tube Feedin mL    Propofol: 185 mL    sodium chloride 0.9%: 240 mL  Total IN: 1127.7 mL    OUT:    Drain (mL): 425 mL    Indwelling Catheter - Urethral (mL): 815 mL    Insulin: 0 mL    Norepinephrine: 0 mL    Vasopressin: 0 mL    Voided (mL): 200 mL  Total OUT: 1440 mL    Total NET: -312.3 mL      29 Sep 2021 07:01  -  29 Sep 2021 08:26  --------------------------------------------------------  IN:    Dexmedetomidine: 23.1 mL    Miscellaneous Tube Feeding: 15 mL    Propofol: 3.7 mL    sodium chloride 0.9%: 10 mL  Total IN: 51.8 mL    OUT:  Total OUT: 0 mL    Total NET: 51.8 mL        ============================ LABS =========================                        10.3   17.63 )-----------( 114      ( 29 Sep 2021 00:49 )             32.3         144  |  111<H>  |  23  ----------------------------<  157<H>  4.0   |  19<L>  |  0.48<L>    Ca    10.8<H>      29 Sep 2021 00:49  Phos  3.8       Mg     1.7         TPro  7.6  /  Alb  3.8  /  TBili  1.8<H>  /  DBili  x   /  AST  14  /  ALT  21  /  AlkPhos  167<H>      LIVER FUNCTIONS - ( 29 Sep 2021 00:49 )  Alb: 3.8 g/dL / Pro: 7.6 g/dL / ALK PHOS: 167 U/L / ALT: 21 U/L / AST: 14 U/L / GGT: x             ABG - ( 29 Sep 2021 00:43 )  pH, Arterial: 7.43  pH, Blood: x     /  pCO2: 32    /  pO2: 192   / HCO3: 21    / Base Excess: -2.5  /  SaO2: 99.6      ======================Micro/Rad/Cardio=================  Culture: Reviewed   CXR: Reviewed  Echo:Reviewed  ======================================================  PAST MEDICAL & SURGICAL HISTORY:  CHF (congestive heart failure)    CAD (coronary artery disease)    Depression    Pleural effusion    History of  novel coronavirus disease (COVID-19)  2020    Hemorrhoids    Bleeding hemorrhoids    Peripheral arterial disease    Claudication    BPH with urinary obstruction    ACC/AHA stage D systolic heart failure    Anticoagulation goal of INR 2.0 to 2.5    Falls    Clavicle fracture    CKD (chronic kidney disease), stage III    Iron deficiency anemia    H/O epistaxis    Vertigo    GI bleed    S/P TVR (tricuspid valve repair)    S/P ventricular assist device    S/P endoscopy      ====================ASSESSMENT ==============  Stage D Nonischemic Cardiomyopathy, Status Post HM2 on 2017    Cardiogenic shock  Hemodynamic instability   Acute hypoxemic respiratory failure s/p trach , decannulated on ; Reintubated on    GI bleed , Status Post Enteroscopy   Anemia, in setting of melena   Chronic Kidney Disease  Stress hyperglycemia   C.diff positive on    Hypovolemic shock  Septic shock  Leukocytosis  GB thickening/percholecystic s/p perc choley by IT   SMA stenosis  Serratia/citrobacter pneumonia   Stenotrophomonas pneumonia   Enterobacter pneumonia   Nasuea/vomiting  Deconditioning    Plan:  ====================== NEUROLOGY=====================  Plan to d/c IV Propofol, wean sedation with IV Precedex as tolerated.   Deconditioned, PT as tolerated  Tylenol PRN for analgesia     acetaminophen    Suspension .. 650 milliGRAM(s) Enteral Tube every 6 hours PRN Mild Pain (1 - 3)  dexMEDEtomidine Infusion 1.5 MICROgram(s)/kG/Hr (23.1 mL/Hr) IV Continuous <Continuous>  propofol Infusion 13.528 MICROgram(s)/kG/Min (5 mL/Hr) IV Continuous <Continuous>    ==================== RESPIRATORY======================  Acute hypoxemic respiratory failure s/p #8 shiley trach on ; decannulated on ; Reintubated on    Continue close monitoring of respiratory rate and breathing pattern, following of ABG’s with A-line monitoring, continuous pulse oximetry monitoring.   Planning for re-trach     Mechanical Ventilation:  Mode: AC/ CMV (Assist Control/ Continuous Mandatory Ventilation)  RR (machine): 20  TV (machine): 500  FiO2: 40  PEEP: 5  ITime: 1  MAP: 11  PIP: 22      ====================CARDIOVASCULAR==================  Stage D Nonischemic Cardiomyopathy, Status Post HM2 on 2017; LVAD settings 9200, flow 4.6  TTE : EF 12%, mild-mod AR, mild-mod TR, severe global LV systolic dysfxn, RV enlargement with decreased fxn  Propranolol for rate control of HR 2/2 Hyperthyroidism   Persistent hypertension, continue afterload reduction with Hydralazine    hydrALAZINE 25 milliGRAM(s) Oral every 8 hours  propranolol 40 milliGRAM(s) Oral every 8 hours    ===================HEMATOLOGIC/ONC ===================  CTA A/P on  +L RP hematoma   Acute blood loss anemia, monitor H&H/Plts    Holding coumadin and ASA given recurrent GI bleeding, continue monitoring LDH     ===================== RENAL =========================  Continue monitoring urine output, I&OS, BUN/Cr   Euvolemic, even fluid balance, currently off diuretics.    Replete lytes PRN. Keep K> 4 and Mg >2     ==================== GASTROINTESTINAL===================  S/p cholecystostomy tube placed on  with IR  Tolerating TFs, Vital 1.5, increased to 15cc/hr this AM, will increase to 20cc/hr as tolerated.   CTA A/P : Evaluation of the mesenteric vessels is limited by streak artifact from LVAD. There appears to be severe stenosis of the proximal SMA; abdominal mesenteric doppler is recommended for further evaluation. 2.  No small bowel findings to suggest acute mesenteric ischemia. 3.  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  Mesenteric duplex on 8/15 borderline stenosis of proximal SMA, s/p failed SMA stent, L fem RP hematoma on 9/10; No intervention for cholecystostomy tube, PEJ  Reglan for gut motility  Zofran PRN nausea/vomiting    multivitamin 1 Tablet(s) Oral daily  GI prophylaxis, pantoprazole  Injectable 40 milliGRAM(s) IV Push every 12 hours  simethicone 80 milliGRAM(s) Chew every 8 hours PRN Gas  sodium chloride 0.9%. 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; Will f/u with Endocrine re: management of hyperthyroidism in setting of elevated T3  Per endocrine      - Thyroid US when trach is out      - c/w Methimazole       - Titrate stress dose steroids     hydrocortisone sodium succinate Injectable 25 milliGRAM(s) IV Push every 8 hours  insulin lispro (ADMELOG) corrective regimen sliding scale   SubCutaneous every 6 hours  methimazole 20 milliGRAM(s) Oral every 8 hours    ========================INFECTIOUS DISEASE================  Afebrile, WBC downtrending 28.31->17.63   Continue trending WBC and monitoring fever curve   CT C/A/P : progressive ISABELLE opacities and L consolidations, multifocal pneumonia        Patient requires continuous monitoring with bedside rhythm monitoring, pulse ox monitoring, and intermittent blood gas analysis. Care plan discussed with ICU care team. Patient remained critical and at risk for life threatening decompensation.     By signing my name below, I, Agnieszka Cherry, attest that this documentation has been prepared under the direction and in the presence of CLAUDIA Bey   Electronically signed: Romel Shaffer, 21 @ 08:26    I, Luciano Ayala, personally performed the services described in this documentation. all medical record entries made by the romel were at my direction and in my presence. I have reviewed the chart and agree that the record reflects my personal performance and is accurate and complete  Electronically signed: CLAUDIA Bey

## 2021-09-30 NOTE — PROGRESS NOTE ADULT - PROBLEM SELECTOR PLAN 3
- Chronic in nature with questionable SMA stenosis on imaging and mesenteric duplex difficult to interpret with continuous flow. Underwent angiogram on 9/11 which showed SMA stenosis. Unable to place stent. Would hold off on repeating angiogram at this time.   - Please resume tube feeds today, will up-titrate as tolerated   - Discussed cholecystostomy with general surgery, unlikely to derive symptomatic benefit since perc dawn tube continues to drain. Repeat RUQ shows contracted gallbladder. Per general surgery holding off on removal of drain at this time.

## 2021-09-30 NOTE — PROGRESS NOTE ADULT - ASSESSMENT
Assessment and Recommendation:   · Assessment	  Assessment and recommendation :  Recurrent Acute hypoxic respiratory Failure reintubated on full ventilator support FI02 is 40%  Acute blood loss anemia S/P multiple  blood transfusion   recurrent  septic shock on vasopressin and levophed   hemorrhagic shock receiving 3 unit of blood transfusion   pan culture negative S/P  IV vancomycin , po vancomycin and Zosyn and Diflucan   S/P cholecystostomy tube placement by IR    AF RVR back to regular sinus Rhythm   Non ischemic cardiomyopathy continue ACE inhibitor and B-Blockers   mesenteric ischemia failure to stent SMA , no plan for repeated trial   S/P 3 unit of blood transfusion   S/P Septic shock and cardiogenic shock   Stage D systolic heart failure S/P LVAD HM2   MH2 LVAD  with  TV Annuloplasty  Severe peripheral vascular disease   severe hyperglycemia on insulin coverage    Reglan 10 mg for Gastric Motility   hyperthyroidism on Methimazole 10mg TID   critical care polyneuropathy   Anemia of Acute blood Loss   severe protein caloric malnutrition   Chronic kidney disease stage III  Depressed and withdrawn   resume NG tube feeding   GI prophylaxis with PPI   Discussed with TCV team   critical care time is 35 minutes

## 2021-09-30 NOTE — PROGRESS NOTE ADULT - PROBLEM SELECTOR PLAN 7
- endocrine recs appreciated  - on propranolol 40 mg q8hr  - currently on methimazole  - Steroid taper completed  - Continue to monitor TFT post angiogram

## 2021-09-30 NOTE — PROGRESS NOTE ADULT - PROBLEM SELECTOR PLAN 1
- has become hypotensive requiring pressors for support   - please d/c hydralazine - has become hypotensive requiring increased pressors for support   - please d/c hydralazine

## 2021-09-30 NOTE — PROGRESS NOTE ADULT - SUBJECTIVE AND OBJECTIVE BOX
RICKY HAMPTON  MRN-[id number]  Patient is a 66y old  Male who presents with a chief complaint of Anemia, Supratherapeutic INR, Dark Stools (30 Sep 2021 14:08)    HPI:  65M PMH ACC/AHA stage D HF due to NICM HM2 LVAD , TV annuloplasty ring 17 as destination therapy due to severe peripheral artery disease with significant stenosis  SIADH, Depression, CKD-3 with hyperkalemia, past E. coli UTIs, driveline drainage (21) and COVID-19 (back in 2020)  He was recently seen in clinic where he complained of abdominal pain and dark stools w constipation back in May. He presents to [facility] ER today weakness and fatigue, moderate and + Black stools for three days, on coumadin secondary to warfarin use in the setting of an LVAD. Patient has required transfusions for GIB in the past. Mostly recently back in 2021 pt had anemia with dark stools. No interventions was done at that time. However Last Endoscopy was done in 2020 (negative). Today labs show patient is anemic with H/H of 4.5/16.3,. INR is 8.84 MAP in the 90s, Temp 35.1. He denies any chest pain, shortness of breath, dizziness, abd pain, nausea or vomiting.       (2021 16:57)      Surgery/Hospital course:   admit for melena w/ anemia, INR 8.84   6/15 Capsul study (+) for small bowel bleed, balloon endoscopy (old blood in prox ileum); post EGD - septic w/ L opacity, re-intubated for concern for aspiration, TTE (Mod MR, decrease biV w/ interventricular septum boweing towards R)   bronch    +C Diff    CT C/A/P: Fluid filled colon which may be 2/2 rapid transit. Small bilateral pleffs with associates. Compressive atelectasis New ISABELLE & LLL  parenchymal opacities, suspicious for pneumonia. 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    #8 Shiley trach at bedside    LVAD speed increased to 9200   Bronch   TC since . Patient transferred to SDU.    INR today 2.64.  H&H 7.3/24 this AM.  Will repeat CBC at noon, and will send stool guaiac Patient with persistent abdominal tenderness, rate of tube feeds decreased.  No nausea/vomiting.     INR today 2.4. H&H 9.1/28.6 low flow overnight /N&V, refusing Tube feeds on D5 1/2 normal  @50 cc/hr   INR 2.69  H&H 7.7/.1 refusing Tube feeds on D5 1/2 normal  @50 cc/hr. This am + BM Melena Dr Oneill HF  aware- PRBC x1  GI team consulted -  NPO  plan on study in am-  D/w Dr Cadet Patient  to return to CTU for further management; 1PRBCS    Post op INR 2.2 today.  No bleeding. BC + for SM.  Pt is hypotensive requiring pressor and inotropic support.  ID follow up today on Cefepime will follow.   R PTC for PTX    CT C/A/P: sub q emphysema in R chest wall, GGO RUL, small ascites CTH negative; Abd US: GB thickening, pericholecystic fluid     Perchole drain in place continues to drain total output overnight 133.  Fever today 38.8    duplex LE negative    Patient with persistent abdominal pain, refusing tube feeds and medications, Psych consulted   CTA A/P ordered to r/o mesenteric ischemia 2/2 persistent anorexia, nausea, vomiting. Revealed:  Evaluation of the mesenteric vessels is limited by streak artifact from LVAD. There appears to be severe stenosis of the proximal SMA; abdominal mesenteric doppler is recommended for further evaluation. 2.  No small bowel findings to suggest acute mesenteric ischemia. 3.  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  8/15: Cultures resulted BC 1/2 +GPC in clusters, SC enterobacter; mesenteric duplex: borderline stenosis of proximal SMA  : CT C/A/P noncon: Nondular opacities in R lung apex w/cavitation, abd nl  :  Continue current care, treatment of thyrotoxicosis with medications as per endocrine, d/c ABX as per team.    RUQ sono: Contracted gallbladder with cholecystostomy tube in place.  9/10 failed SMA stent, L fem PSA, s/p 3U PRCB   CTA Abd/Pelvis L RP hematoma    Trach decannulated    intubated fro resp distress for increased WOB, LL pneumonia; CT C/A/P: progressive ISABELLE opacities and L consolidations, multifocal pneumonia     Today/Overnight:  - Restarted on sedation with IV Propofol and IV Precedex  - Placed Landrum for monitoring urine output  - Hypotensive this AM, started on IV Levophed and IV Vasopressin    Vital Signs Last 24 Hrs  T(C): 38 (30 Sep 2021 16:00), Max: 40.8 (30 Sep 2021 10:30)  T(F): 100.4 (30 Sep 2021 16:00), Max: 105.4 (30 Sep 2021 10:30)  HR: 82 (30 Sep 2021 19:30) (74 - 114)  BP: --  BP(mean): --  RR: 20 (30 Sep 2021 19:30) (10 - 49)  SpO2: 100% (30 Sep 2021 19:30) (92% - 100%)  ============================I/O===========================  I&O's Detail    29 Sep 2021 07:01  -  30 Sep 2021 07:00  --------------------------------------------------------  IN:    Dexmedetomidine: 531.4 mL    Enteral Tube Flush: 175 mL    IV PiggyBack: 50 mL    Miscellaneous Tube Feedin mL    Propofol: 120.7 mL    sodium chloride 0.9%: 240 mL  Total IN: 1757.1 mL    OUT:    Drain (mL): 350 mL    Voided (mL): 1600 mL  Total OUT: 1950 mL    Total NET: -192.9 mL      30 Sep 2021 07:01  -  30 Sep 2021 20:04  --------------------------------------------------------  IN:    Albumin 5%  - 250 mL: 500 mL    Dexmedetomidine: 277.2 mL    Enteral Tube Flush: 240 mL    IV PiggyBack: 100 mL    IV PiggyBack: 250 mL    Miscellaneous Tube Feedin mL    Norepinephrine: 84.2 mL    Propofol: 14.6 mL    sodium chloride 0.9%: 120 mL    Vasopressin: 18 mL  Total IN: 1724 mL    OUT:    Drain (mL): 50 mL    Indwelling Catheter - Urethral (mL): 75 mL    Voided (mL): 850 mL  Total OUT: 975 mL    Total NET: 749 mL        ============================ LABS =========================                        10.2   20.58 )-----------( 62       ( 30 Sep 2021 12:05 )             31.5         146<H>  |  115<H>  |  26<H>  ----------------------------<  137<H>  4.1   |  17<L>  |  0.99    Ca    9.9      30 Sep 2021 12:05  Phos  1.2       Mg     1.8         TPro  6.9  /  Alb  3.5  /  TBili  3.6<H>  /  DBili  x   /  AST  16  /  ALT  18  /  AlkPhos  195<H>      LIVER FUNCTIONS - ( 30 Sep 2021 12:05 )  Alb: 3.5 g/dL / Pro: 6.9 g/dL / ALK PHOS: 195 U/L / ALT: 18 U/L / AST: 16 U/L / GGT: x             ABG - ( 30 Sep 2021 19:41 )  pH, Arterial: 7.36  pH, Blood: x     /  pCO2: 32    /  pO2: 167   / HCO3: 18    / Base Excess: -6.5  /  SaO2: 100.0               ======================Micro/Rad/Cardio=================  Culture: Reviewed   CXR: Reviewed  Echo: Reviewed  ======================================================  PAST MEDICAL & SURGICAL HISTORY:  CHF (congestive heart failure)    CAD (coronary artery disease)    Depression    Pleural effusion    History of  novel coronavirus disease (COVID-19)  2020    Hemorrhoids    Bleeding hemorrhoids    Peripheral arterial disease    Claudication    BPH with urinary obstruction    ACC/AHA stage D systolic heart failure    Anticoagulation goal of INR 2.0 to 2.5    Falls    Clavicle fracture    CKD (chronic kidney disease), stage III    Iron deficiency anemia    H/O epistaxis    Vertigo    GI bleed    S/P TVR (tricuspid valve repair)    S/P ventricular assist device    S/P endoscopy      ========================ASSESSMENT ================  Stage D Nonischemic Cardiomyopathy, Status Post HM2 on 2017    Cardiogenic shock  Hemodynamic instability   Acute hypoxemic respiratory failure s/p trach , decannulated on ; Reintubated on    GI bleed , Status Post Enteroscopy   Anemia, in setting of melena   Chronic Kidney Disease  Stress hyperglycemia   C.diff positive on    Hypovolemic shock  Septic shock  Leukocytosis  GB thickening/percholecystic s/p perc choley by IT   SMA stenosis  Serratia/citrobacter pneumonia   Stenotrophomonas pneumonia   Enterobacter pneumonia   Nasuea/vomiting  Deconditioning    Plan:  ====================== NEUROLOGY=====================  Sedated with IV Precedex and IV Propofol drips for ventilator synchrony.   Assess neuro status per protocol when pt is off sedation.   Tylenol PRN for analgesia     acetaminophen    Suspension .. 650 milliGRAM(s) Enteral Tube every 6 hours PRN Mild Pain (1 - 3)  dexMEDEtomidine Infusion 1.5 MICROgram(s)/kG/Hr (23.1 mL/Hr) IV Continuous <Continuous>  propofol Infusion 13.528 MICROgram(s)/kG/Min (5 mL/Hr) IV Continuous <Continuous>    ==================== RESPIRATORY======================  Acute hypoxemic respiratory failure s/p #8 shiley trach on ; decannulated on ; Reintubated on    Continue close monitoring of respiratory rate and breathing pattern, following of ABG’s with A-line monitoring, continuous pulse oximetry monitoring.   Planning for re-trach, potentially tomorrow pending family meeting     Mechanical Ventilation:  Mode: AC/ CMV (Assist Control/ Continuous Mandatory Ventilation)  RR (machine): 20  TV (machine): 500  FiO2: 40  PEEP: 5  ITime: 1  MAP: 12  PIP: 28      ====================CARDIOVASCULAR==================  Stage D Nonischemic Cardiomyopathy, Status Post HM2 on 2017; LVAD settings 9200, flow 5.7   TTE : EF 12%, mild-mod AR, mild-mod TR, severe global LV systolic dysfxn, RV enlargement with decreased fxn  Propranolol for rate control of HR 2/2 Hyperthyroidism, goal HR 60-80  Hypotensive this AM, restarted pressor support with IV Levophed and Vasopressin  Hydralazine for afterload reduction, currently on hold   WCT this AM in setting of hypokalemia, supplemented     norepinephrine Infusion 0.05 MICROgram(s)/kG/Min (5.78 mL/Hr) IV Continuous <Continuous>  vasopressin Infusion 0.067 Unit(s)/Min (4 mL/Hr) IV Continuous <Continuous>    ===================HEMATOLOGIC/ONC ===================  CTA A/P on  +L RP hematoma   Acute blood loss anemia, monitor H&H/Plts    Platelets downtrending, possible in setting of sepsis   Holding coumadin and ASA given recurrent GI bleeding, continue monitoring LDH     ===================== RENAL =========================  Continue monitoring urine output, I&OS, BUN/Cr   Euvolemic, even fluid balance, currently off diuretics.    Replete lytes PRN. Keep K> 4 and Mg >2   Placed Landrum for monitoring urine output    ==================== GASTROINTESTINAL===================  S/p cholecystostomy tube placed on  with IR  Tube feeds held this AM 2/2 hypotension and suspected septic shock   CTA A/P : Evaluation of the mesenteric vessels is limited by streak artifact from LVAD. There appears to be severe stenosis of the proximal SMA; abdominal mesenteric doppler is recommended for further evaluation. 2.  No small bowel findings to suggest acute mesenteric ischemia. 3.  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  Mesenteric duplex on 8/15 borderline stenosis of proximal SMA, s/p failed SMA stent, L fem RP hematoma on 9/10; No intervention for cholecystostomy tube, PEJ  Reglan for gut motility  Zofran PRN nausea/vomiting  Continue Protonix for stress ulcer prophylaxis.     metoclopramide Injectable 10 milliGRAM(s) IV Push every 8 hours  ondansetron Injectable 4 milliGRAM(s) IV Push every 6 hours PRN Nausea and/or Vomiting  multivitamin 1 Tablet(s) Oral daily  pantoprazole  Injectable 40 milliGRAM(s) IV Push every 12 hours  simethicone 80 milliGRAM(s) Chew every 8 hours PRN Gas  sodium chloride 0.9%. 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   Per endocrine      - Thyroid US when trach is out      - c/w Methimazole, adjust based on labs        - Check Free T4 and total T3 today        - Propranolol as above     insulin lispro (ADMELOG) corrective regimen sliding scale   SubCutaneous every 6 hours  methimazole 20 milliGRAM(s) Oral every 8 hours      ========================INFECTIOUS DISEASE================  TMAX: 105.4F, WBC restarted 28.31->17.63->20.58  Continue trending WBC and monitoring fever curve   CT C/A/P : progressive ISABELLE opacities and L consolidations, multifocal pneumonia  SCx  +Enterobacter cloacae complex  Restarted Cefepime for coverage this AM   Blood cultures sent     cefepime   IVPB 1000 milliGRAM(s) IV Intermittent every 8 hours      Patient requires continuous monitoring with bedside rhythm monitoring, pulse oximetry monitoring, and continuous central venous and arterial pressure monitoring; and intermittent blood gas analysis.  Care plan discussed with ICU care team.    Patient remained critical, at risk for life threatening decompensation.   I have spent 35 minutes providing acute care with multiple re-evaluations throughout the evening.     By signing my name below, I, Daniela Chowdary, attest that this documentation has been prepared under the direction and in the presence of CLAUDIA Lee.  Electronically signed: Cesia Suresh, 21 @ 20:04    I, CLAUDIA Lee, personally performed the services described in this documentation. All medical record entries made by the scribe were at my direction and in my presence. I have reviewed the chart and agree that the record reflects my personal performance and is accurate and complete  Electronically signed: CLAUDIA Lee, 21 @ 20:04       RICKY HAMPTON  MRN-[id number]  Patient is a 66y old  Male who presents with a chief complaint of Anemia, Supratherapeutic INR, Dark Stools (30 Sep 2021 14:08)    HPI:  65M PMH ACC/AHA stage D HF due to NICM HM2 LVAD , TV annuloplasty ring 17 as destination therapy due to severe peripheral artery disease with significant stenosis  SIADH, Depression, CKD-3 with hyperkalemia, past E. coli UTIs, driveline drainage (21) and COVID-19 (back in 2020)  He was recently seen in clinic where he complained of abdominal pain and dark stools w constipation back in May. He presents to [facility] ER today weakness and fatigue, moderate and + Black stools for three days, on coumadin secondary to warfarin use in the setting of an LVAD. Patient has required transfusions for GIB in the past. Mostly recently back in 2021 pt had anemia with dark stools. No interventions was done at that time. However Last Endoscopy was done in 2020 (negative). Today labs show patient is anemic with H/H of 4.5/16.3,. INR is 8.84 MAP in the 90s, Temp 35.1. He denies any chest pain, shortness of breath, dizziness, abd pain, nausea or vomiting.       (2021 16:57)      Surgery/Hospital course:   admit for melena w/ anemia, INR 8.84   6/15 Capsul study (+) for small bowel bleed, balloon endoscopy (old blood in prox ileum); post EGD - septic w/ L opacity, re-intubated for concern for aspiration, TTE (Mod MR, decrease biV w/ interventricular septum boweing towards R)   bronch    +C Diff    CT C/A/P: Fluid filled colon which may be 2/2 rapid transit. Small bilateral pleffs with associates. Compressive atelectasis New ISABELLE & LLL  parenchymal opacities, suspicious for pneumonia. 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    #8 Shiley trach at bedside    LVAD speed increased to 9200   Bronch   TC since . Patient transferred to SDU.    INR today 2.64.  H&H 7.3/24 this AM.  Will repeat CBC at noon, and will send stool guaiac Patient with persistent abdominal tenderness, rate of tube feeds decreased.  No nausea/vomiting.     INR today 2.4. H&H 9.1/28.6 low flow overnight /N&V, refusing Tube feeds on D5 1/2 normal  @50 cc/hr   INR 2.69  H&H 7.7/.1 refusing Tube feeds on D5 1/2 normal  @50 cc/hr. This am + BM Melena Dr Oneill HF  aware- PRBC x1  GI team consulted -  NPO  plan on study in am-  D/w Dr Cadet Patient  to return to CTU for further management; 1PRBCS    Post op INR 2.2 today.  No bleeding. BC + for SM.  Pt is hypotensive requiring pressor and inotropic support.  ID follow up today on Cefepime will follow.   R PTC for PTX    CT C/A/P: sub q emphysema in R chest wall, GGO RUL, small ascites CTH negative; Abd US: GB thickening, pericholecystic fluid     Perchole drain in place continues to drain total output overnight 133.  Fever today 38.8    duplex LE negative    Patient with persistent abdominal pain, refusing tube feeds and medications, Psych consulted   CTA A/P ordered to r/o mesenteric ischemia 2/2 persistent anorexia, nausea, vomiting. Revealed:  Evaluation of the mesenteric vessels is limited by streak artifact from LVAD. There appears to be severe stenosis of the proximal SMA; abdominal mesenteric doppler is recommended for further evaluation. 2.  No small bowel findings to suggest acute mesenteric ischemia. 3.  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  8/15: Cultures resulted BC 1/2 +GPC in clusters, SC enterobacter; mesenteric duplex: borderline stenosis of proximal SMA  : CT C/A/P noncon: Nondular opacities in R lung apex w/cavitation, abd nl  :  Continue current care, treatment of thyrotoxicosis with medications as per endocrine, d/c ABX as per team.    RUQ sono: Contracted gallbladder with cholecystostomy tube in place.  9/10 failed SMA stent, L fem PSA, s/p 3U PRCB   CTA Abd/Pelvis L RP hematoma    Trach decannulated    intubated fro resp distress for increased WOB, LL pneumonia; CT C/A/P: progressive ISABELLE opacities and L consolidations, multifocal pneumonia     Today/Overnight:  - Restarted on sedation with IV Propofol and IV Precedex  - Placed Landrum for monitoring urine output  - Hypotensive this AM, started on IV Levophed and IV Vasopressin    Vital Signs Last 24 Hrs  T(C): 38 (30 Sep 2021 16:00), Max: 40.8 (30 Sep 2021 10:30)  T(F): 100.4 (30 Sep 2021 16:00), Max: 105.4 (30 Sep 2021 10:30)  HR: 82 (30 Sep 2021 19:30) (74 - 114)  BP: --  BP(mean): --  RR: 20 (30 Sep 2021 19:30) (10 - 49)  SpO2: 100% (30 Sep 2021 19:30) (92% - 100%)  ============================I/O===========================  I&O's Detail    29 Sep 2021 07:01  -  30 Sep 2021 07:00  --------------------------------------------------------  IN:    Dexmedetomidine: 531.4 mL    Enteral Tube Flush: 175 mL    IV PiggyBack: 50 mL    Miscellaneous Tube Feedin mL    Propofol: 120.7 mL    sodium chloride 0.9%: 240 mL  Total IN: 1757.1 mL    OUT:    Drain (mL): 350 mL    Voided (mL): 1600 mL  Total OUT: 1950 mL    Total NET: -192.9 mL      30 Sep 2021 07:01  -  30 Sep 2021 20:04  --------------------------------------------------------  IN:    Albumin 5%  - 250 mL: 500 mL    Dexmedetomidine: 277.2 mL    Enteral Tube Flush: 240 mL    IV PiggyBack: 100 mL    IV PiggyBack: 250 mL    Miscellaneous Tube Feedin mL    Norepinephrine: 84.2 mL    Propofol: 14.6 mL    sodium chloride 0.9%: 120 mL    Vasopressin: 18 mL  Total IN: 1724 mL    OUT:    Drain (mL): 50 mL    Indwelling Catheter - Urethral (mL): 75 mL    Voided (mL): 850 mL  Total OUT: 975 mL    Total NET: 749 mL        ============================ LABS =========================                        10.2   20.58 )-----------( 62       ( 30 Sep 2021 12:05 )             31.5         146<H>  |  115<H>  |  26<H>  ----------------------------<  137<H>  4.1   |  17<L>  |  0.99    Ca    9.9      30 Sep 2021 12:05  Phos  1.2       Mg     1.8         TPro  6.9  /  Alb  3.5  /  TBili  3.6<H>  /  DBili  x   /  AST  16  /  ALT  18  /  AlkPhos  195<H>      LIVER FUNCTIONS - ( 30 Sep 2021 12:05 )  Alb: 3.5 g/dL / Pro: 6.9 g/dL / ALK PHOS: 195 U/L / ALT: 18 U/L / AST: 16 U/L / GGT: x             ABG - ( 30 Sep 2021 19:41 )  pH, Arterial: 7.36  pH, Blood: x     /  pCO2: 32    /  pO2: 167   / HCO3: 18    / Base Excess: -6.5  /  SaO2: 100.0               ======================Micro/Rad/Cardio=================  Culture: Reviewed   CXR: Reviewed  Echo: Reviewed  ======================================================  PAST MEDICAL & SURGICAL HISTORY:  CHF (congestive heart failure)    CAD (coronary artery disease)    Depression    Pleural effusion    History of  novel coronavirus disease (COVID-19)  2020    Hemorrhoids    Bleeding hemorrhoids    Peripheral arterial disease    Claudication    BPH with urinary obstruction    ACC/AHA stage D systolic heart failure    Anticoagulation goal of INR 2.0 to 2.5    Falls    Clavicle fracture    CKD (chronic kidney disease), stage III    Iron deficiency anemia    H/O epistaxis    Vertigo    GI bleed    S/P TVR (tricuspid valve repair)    S/P ventricular assist device    S/P endoscopy      ========================ASSESSMENT ================  Stage D Nonischemic Cardiomyopathy, Status Post HM2 on 2017    Cardiogenic shock  Hemodynamic instability   Acute hypoxemic respiratory failure s/p trach , decannulated on ; Reintubated on    GI bleed , Status Post Enteroscopy   Anemia, in setting of melena   Chronic Kidney Disease  Stress hyperglycemia   C.diff positive on    Hypovolemic shock  Septic shock  Leukocytosis  GB thickening/percholecystic s/p perc choley by IT   SMA stenosis  Serratia/citrobacter pneumonia   Stenotrophomonas pneumonia   Enterobacter pneumonia   Nasuea/vomiting  Deconditioning    Plan:  ====================== NEUROLOGY=====================  Sedated with IV Precedex and IV Propofol drips for ventilator synchrony.   Assess neuro status per protocol when pt is off sedation.   Tylenol PRN for analgesia     acetaminophen    Suspension .. 650 milliGRAM(s) Enteral Tube every 6 hours PRN Mild Pain (1 - 3)  dexMEDEtomidine Infusion 1.5 MICROgram(s)/kG/Hr (23.1 mL/Hr) IV Continuous <Continuous>  propofol Infusion 13.528 MICROgram(s)/kG/Min (5 mL/Hr) IV Continuous <Continuous>    ==================== RESPIRATORY======================  Acute hypoxemic respiratory failure s/p #8 shiley trach on ; decannulated on ; Reintubated on    Continue close monitoring of respiratory rate and breathing pattern, following of ABG’s with A-line monitoring, continuous pulse oximetry monitoring.   Planning for re-trach, potentially tomorrow pending family meeting     Mechanical Ventilation:  Mode: AC/ CMV (Assist Control/ Continuous Mandatory Ventilation)  RR (machine): 20  TV (machine): 500  FiO2: 40  PEEP: 5  ITime: 1  MAP: 12  PIP: 28      ====================CARDIOVASCULAR==================  Stage D Nonischemic Cardiomyopathy, Status Post HM2 on 2017; LVAD settings 9200, flow 5.8  TTE : EF 12%, mild-mod AR, mild-mod TR, severe global LV systolic dysfxn, RV enlargement with decreased fxn  Propranolol for rate control of HR 2/2 Hyperthyroidism, goal HR 60-80  Hypotensive this AM, restarted pressor support with IV Levophed and Vasopressin  Hydralazine for afterload reduction, currently on hold   WCT this AM in setting of hypokalemia, supplemented     norepinephrine Infusion 0.05 MICROgram(s)/kG/Min (5.78 mL/Hr) IV Continuous <Continuous>  vasopressin Infusion 0.067 Unit(s)/Min (4 mL/Hr) IV Continuous <Continuous>    ===================HEMATOLOGIC/ONC ===================  CTA A/P on  +L RP hematoma   Acute blood loss anemia, monitor H&H/Plts    Platelets downtrending, possible in setting of sepsis   Holding coumadin and ASA given recurrent GI bleeding, continue monitoring LDH     ===================== RENAL =========================  Continue monitoring urine output, I&OS, BUN/Cr   Euvolemic, even fluid balance, currently off diuretics.    Replete lytes PRN. Keep K> 4 and Mg >2   Placed Landrum for monitoring urine output    ==================== GASTROINTESTINAL===================  S/p cholecystostomy tube placed on  with IR  Tube feeds held this AM 2/2 hypotension and suspected septic shock   CTA A/P : Evaluation of the mesenteric vessels is limited by streak artifact from LVAD. There appears to be severe stenosis of the proximal SMA; abdominal mesenteric doppler is recommended for further evaluation. 2.  No small bowel findings to suggest acute mesenteric ischemia. 3.  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  Mesenteric duplex on 8/15 borderline stenosis of proximal SMA, s/p failed SMA stent, L fem RP hematoma on 9/10; No intervention for cholecystostomy tube, PEJ  Reglan for gut motility  Zofran PRN nausea/vomiting  Continue Protonix for stress ulcer prophylaxis.     metoclopramide Injectable 10 milliGRAM(s) IV Push every 8 hours  ondansetron Injectable 4 milliGRAM(s) IV Push every 6 hours PRN Nausea and/or Vomiting  multivitamin 1 Tablet(s) Oral daily  pantoprazole  Injectable 40 milliGRAM(s) IV Push every 12 hours  simethicone 80 milliGRAM(s) Chew every 8 hours PRN Gas  sodium chloride 0.9%. 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   Per endocrine      - Thyroid US when trach is out      - c/w Methimazole, adjust based on labs        - Check Free T4 and total T3 today        - Propranolol as above     insulin lispro (ADMELOG) corrective regimen sliding scale   SubCutaneous every 6 hours  methimazole 20 milliGRAM(s) Oral every 8 hours      ========================INFECTIOUS DISEASE================  TMAX: 105.4F, WBC restarted 28.31->17.63->20.58  Continue trending WBC and monitoring fever curve   CT C/A/P : progressive ISABELLE opacities and L consolidations, multifocal pneumonia  SCx  +Enterobacter cloacae complex  Restarted Cefepime for coverage this AM   Blood cultures sent     cefepime   IVPB 1000 milliGRAM(s) IV Intermittent every 8 hours      Patient requires continuous monitoring with bedside rhythm monitoring, pulse oximetry monitoring, and continuous central venous and arterial pressure monitoring; and intermittent blood gas analysis.  Care plan discussed with ICU care team.    Patient remained critical, at risk for life threatening decompensation.   I have spent 35 minutes providing acute care with multiple re-evaluations throughout the evening.     By signing my name below, I, Daniela Chowdary, attest that this documentation has been prepared under the direction and in the presence of CLAUDIA Lee.  Electronically signed: Cesia Suresh, 21 @ 20:04    I, CLAUDIA Lee, personally performed the services described in this documentation. All medical record entries made by the scribe were at my direction and in my presence. I have reviewed the chart and agree that the record reflects my personal performance and is accurate and complete  Electronically signed: CLAUDIA Lee, 21 @ 20:04       RICKY HAMPTON  MRN-[id number]  Patient is a 66y old  Male who presents with a chief complaint of Anemia, Supratherapeutic INR, Dark Stools (30 Sep 2021 14:08)    HPI:  65M PMH ACC/AHA stage D HF due to NICM HM2 LVAD , TV annuloplasty ring 17 as destination therapy due to severe peripheral artery disease with significant stenosis  SIADH, Depression, CKD-3 with hyperkalemia, past E. coli UTIs, driveline drainage (21) and COVID-19 (back in 2020)  He was recently seen in clinic where he complained of abdominal pain and dark stools w constipation back in May. He presents to [facility] ER today weakness and fatigue, moderate and + Black stools for three days, on coumadin secondary to warfarin use in the setting of an LVAD. Patient has required transfusions for GIB in the past. Mostly recently back in 2021 pt had anemia with dark stools. No interventions was done at that time. However Last Endoscopy was done in 2020 (negative). Today labs show patient is anemic with H/H of 4.5/16.3,. INR is 8.84 MAP in the 90s, Temp 35.1. He denies any chest pain, shortness of breath, dizziness, abd pain, nausea or vomiting.       (2021 16:57)      Surgery/Hospital course:   admit for melena w/ anemia, INR 8.84   6/15 Capsul study (+) for small bowel bleed, balloon endoscopy (old blood in prox ileum); post EGD - septic w/ L opacity, re-intubated for concern for aspiration, TTE (Mod MR, decrease biV w/ interventricular septum boweing towards R)   bronch    +C Diff    CT C/A/P: Fluid filled colon which may be 2/2 rapid transit. Small bilateral pleffs with associates. Compressive atelectasis New ISABELLE & LLL  parenchymal opacities, suspicious for pneumonia. 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    #8 Shiley trach at bedside    LVAD speed increased to 9200   Bronch   TC since . Patient transferred to SDU.    INR today 2.64.  H&H 7.3/24 this AM.  Will repeat CBC at noon, and will send stool guaiac Patient with persistent abdominal tenderness, rate of tube feeds decreased.  No nausea/vomiting.     INR today 2.4. H&H 9.1/28.6 low flow overnight /N&V, refusing Tube feeds on D5 1/2 normal  @50 cc/hr   INR 2.69  H&H 7.7/.1 refusing Tube feeds on D5 1/2 normal  @50 cc/hr. This am + BM Melena Dr Oneill HF  aware- PRBC x1  GI team consulted -  NPO  plan on study in am-  D/w Dr Cadet Patient  to return to CTU for further management; 1PRBCS    Post op INR 2.2 today.  No bleeding. BC + for SM.  Pt is hypotensive requiring pressor and inotropic support.  ID follow up today on Cefepime will follow.   R PTC for PTX    CT C/A/P: sub q emphysema in R chest wall, GGO RUL, small ascites CTH negative; Abd US: GB thickening, pericholecystic fluid     Perchole drain in place continues to drain total output overnight 133.  Fever today 38.8    duplex LE negative    Patient with persistent abdominal pain, refusing tube feeds and medications, Psych consulted   CTA A/P ordered to r/o mesenteric ischemia 2/2 persistent anorexia, nausea, vomiting. Revealed:  Evaluation of the mesenteric vessels is limited by streak artifact from LVAD. There appears to be severe stenosis of the proximal SMA; abdominal mesenteric doppler is recommended for further evaluation. 2.  No small bowel findings to suggest acute mesenteric ischemia. 3.  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  8/15: Cultures resulted BC 1/2 +GPC in clusters, SC enterobacter; mesenteric duplex: borderline stenosis of proximal SMA  : CT C/A/P noncon: Nondular opacities in R lung apex w/cavitation, abd nl  :  Continue current care, treatment of thyrotoxicosis with medications as per endocrine, d/c ABX as per team.    RUQ sono: Contracted gallbladder with cholecystostomy tube in place.  9/10 failed SMA stent, L fem PSA, s/p 3U PRCB   CTA Abd/Pelvis L RP hematoma    Trach decannulated    intubated fro resp distress for increased WOB, LL pneumonia; CT C/A/P: progressive ISABELLE opacities and L consolidations, multifocal pneumonia     Today/Overnight:  - Restarted on sedation with IV Propofol and IV Precedex  - Placed Landrum for monitoring urine output  - Hypotensive this AM, started on IV Levophed and IV Vasopressin    Vital Signs Last 24 Hrs  T(C): 38 (30 Sep 2021 16:00), Max: 40.8 (30 Sep 2021 10:30)  T(F): 100.4 (30 Sep 2021 16:00), Max: 105.4 (30 Sep 2021 10:30)  HR: 82 (30 Sep 2021 19:30) (74 - 114)  BP: --  BP(mean): --  RR: 20 (30 Sep 2021 19:30) (10 - 49)  SpO2: 100% (30 Sep 2021 19:30) (92% - 100%)  ============================I/O===========================  I&O's Detail    29 Sep 2021 07:01  -  30 Sep 2021 07:00  --------------------------------------------------------  IN:    Dexmedetomidine: 531.4 mL    Enteral Tube Flush: 175 mL    IV PiggyBack: 50 mL    Miscellaneous Tube Feedin mL    Propofol: 120.7 mL    sodium chloride 0.9%: 240 mL  Total IN: 1757.1 mL    OUT:    Drain (mL): 350 mL    Voided (mL): 1600 mL  Total OUT: 1950 mL    Total NET: -192.9 mL      30 Sep 2021 07:01  -  30 Sep 2021 20:04  --------------------------------------------------------  IN:    Albumin 5%  - 250 mL: 500 mL    Dexmedetomidine: 277.2 mL    Enteral Tube Flush: 240 mL    IV PiggyBack: 100 mL    IV PiggyBack: 250 mL    Miscellaneous Tube Feedin mL    Norepinephrine: 84.2 mL    Propofol: 14.6 mL    sodium chloride 0.9%: 120 mL    Vasopressin: 18 mL  Total IN: 1724 mL    OUT:    Drain (mL): 50 mL    Indwelling Catheter - Urethral (mL): 75 mL    Voided (mL): 850 mL  Total OUT: 975 mL    Total NET: 749 mL        ============================ LABS =========================                        10.2   20.58 )-----------( 62       ( 30 Sep 2021 12:05 )             31.5         146<H>  |  115<H>  |  26<H>  ----------------------------<  137<H>  4.1   |  17<L>  |  0.99    Ca    9.9      30 Sep 2021 12:05  Phos  1.2       Mg     1.8         TPro  6.9  /  Alb  3.5  /  TBili  3.6<H>  /  DBili  x   /  AST  16  /  ALT  18  /  AlkPhos  195<H>      LIVER FUNCTIONS - ( 30 Sep 2021 12:05 )  Alb: 3.5 g/dL / Pro: 6.9 g/dL / ALK PHOS: 195 U/L / ALT: 18 U/L / AST: 16 U/L / GGT: x             ABG - ( 30 Sep 2021 19:41 )  pH, Arterial: 7.36  pH, Blood: x     /  pCO2: 32    /  pO2: 167   / HCO3: 18    / Base Excess: -6.5  /  SaO2: 100.0               ======================Micro/Rad/Cardio=================  Culture: Reviewed   CXR: Reviewed  Echo: Reviewed  ======================================================  PAST MEDICAL & SURGICAL HISTORY:  CHF (congestive heart failure)    CAD (coronary artery disease)    Depression    Pleural effusion    History of  novel coronavirus disease (COVID-19)  2020    Hemorrhoids    Bleeding hemorrhoids    Peripheral arterial disease    Claudication    BPH with urinary obstruction    ACC/AHA stage D systolic heart failure    Anticoagulation goal of INR 2.0 to 2.5    Falls    Clavicle fracture    CKD (chronic kidney disease), stage III    Iron deficiency anemia    H/O epistaxis    Vertigo    GI bleed    S/P TVR (tricuspid valve repair)    S/P ventricular assist device    S/P endoscopy      ========================ASSESSMENT ================  Stage D Nonischemic Cardiomyopathy, Status Post HM2 on 2017    Cardiogenic shock  Hemodynamic instability   Acute hypoxemic respiratory failure s/p trach , decannulated on ; Reintubated on    GI bleed , Status Post Enteroscopy   Anemia, in setting of melena   Chronic Kidney Disease  Stress hyperglycemia   C.diff positive on    Hypovolemic shock  Septic shock  Leukocytosis  GB thickening/percholecystic s/p perc choley by IT   SMA stenosis  Serratia/citrobacter pneumonia   Stenotrophomonas pneumonia   Enterobacter pneumonia   Nasuea/vomiting  Deconditioning    Plan:  ====================== NEUROLOGY=====================  Sedated with IV Precedex and IV Propofol drips for ventilator synchrony.   Assess neuro status per protocol when pt is off sedation.   Tylenol PRN for analgesia     acetaminophen    Suspension .. 650 milliGRAM(s) Enteral Tube every 6 hours PRN Mild Pain (1 - 3)  dexMEDEtomidine Infusion 1.5 MICROgram(s)/kG/Hr (23.1 mL/Hr) IV Continuous <Continuous>  propofol Infusion 13.528 MICROgram(s)/kG/Min (5 mL/Hr) IV Continuous <Continuous>    ==================== RESPIRATORY======================  Acute hypoxemic respiratory failure s/p #8 shiley trach on ; decannulated on ; Reintubated on    Continue close monitoring of respiratory rate and breathing pattern, following of ABG’s with A-line monitoring, continuous pulse oximetry monitoring.   Planning for re-trach in the future, depending on GOC     Mechanical Ventilation:  Mode: AC/ CMV (Assist Control/ Continuous Mandatory Ventilation)  RR (machine): 20  TV (machine): 500  FiO2: 40  PEEP: 5  ITime: 1  MAP: 12  PIP: 28      ====================CARDIOVASCULAR==================  Stage D Nonischemic Cardiomyopathy, Status Post HM2 on 2017; LVAD settings 9200, flow 5.8  TTE : EF 12%, mild-mod AR, mild-mod TR, severe global LV systolic dysfxn, RV enlargement with decreased fxn  Propranolol for rate control of HR 2/2 Hyperthyroidism- now on hold for hypotension this AM    Hypotensive this AM, restarted pressor support with IV Levophed and Vasopressin  PVCs this AM in setting of hypokalemia, supplemented     norepinephrine Infusion 0.05 MICROgram(s)/kG/Min (5.78 mL/Hr) IV Continuous <Continuous>  vasopressin Infusion 0.067 Unit(s)/Min (4 mL/Hr) IV Continuous <Continuous>    ===================HEMATOLOGIC/ONC ===================  CTA A/P on  +L RP hematoma   Acute blood loss anemia, monitor H&H/Plts    Platelets downtrending, possible in setting of sepsis   Holding coumadin and ASA given recurrent GI bleeding, continue monitoring LDH     ===================== RENAL =========================  Continue monitoring urine output, I&OS, BUN/Cr   Euvolemic, even fluid balance, currently off diuretics.    Replete lytes PRN. Keep K> 4 and Mg >2   Placed Landrum for monitoring urine output    ==================== GASTROINTESTINAL===================  S/p cholecystostomy tube placed on  with IR  Tube feeds held this AM 2/2 hypotension and suspected septic shock   CTA A/P : Evaluation of the mesenteric vessels is limited by streak artifact from LVAD. There appears to be severe stenosis of the proximal SMA; abdominal mesenteric doppler is recommended for further evaluation. 2.  No small bowel findings to suggest acute mesenteric ischemia. 3.  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  Mesenteric duplex on 8/15 borderline stenosis of proximal SMA, s/p failed SMA stent, L fem RP hematoma on 9/10; No intervention for cholecystostomy tube, PEJ  Reglan for gut motility  Zofran PRN nausea/vomiting  Continue Protonix for stress ulcer prophylaxis.     metoclopramide Injectable 10 milliGRAM(s) IV Push every 8 hours  ondansetron Injectable 4 milliGRAM(s) IV Push every 6 hours PRN Nausea and/or Vomiting  multivitamin 1 Tablet(s) Oral daily  pantoprazole  Injectable 40 milliGRAM(s) IV Push every 12 hours  simethicone 80 milliGRAM(s) Chew every 8 hours PRN Gas  sodium chloride 0.9%. 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   Per endocrine      - Thyroid US       - c/w Methimazole, adjust based on labs        - Check Free T4 and total T3       - Propranolol    insulin lispro (ADMELOG) corrective regimen sliding scale   SubCutaneous every 6 hours  methimazole 20 milliGRAM(s) Oral every 8 hours      ========================INFECTIOUS DISEASE================  TMAX: 105.4F, WBC restarted 28.31->17.63->20.58  Continue trending WBC and monitoring fever curve   CT C/A/P : progressive ISABELLE opacities and L consolidations, multifocal pneumonia  SCx  +Enterobacter cloacae complex  Restarted Cefepime for coverage this AM   Blood cultures sent     cefepime   IVPB 1000 milliGRAM(s) IV Intermittent every 8 hours      Patient requires continuous monitoring with bedside rhythm monitoring, pulse oximetry monitoring, and continuous central venous and arterial pressure monitoring; and intermittent blood gas analysis.  Care plan discussed with ICU care team.    Patient remained critical, at risk for life threatening decompensation.   I have spent 35 minutes providing acute care with multiple re-evaluations throughout the evening.     By signing my name below, I, Daniela Chowdary, attest that this documentation has been prepared under the direction and in the presence of CLAUDIA Lee.  Electronically signed: Cesia Suresh, 21 @ 20:04    I, CLAUDIA Lee, personally performed the services described in this documentation. All medical record entries made by the luisibe were at my direction and in my presence. I have reviewed the chart and agree that the record reflects my personal performance and is accurate and complete  Electronically signed: CLAUDIA Lee, 21 @ 20:04

## 2021-09-30 NOTE — PROGRESS NOTE ADULT - PROBLEM SELECTOR PLAN 4
- ID following and appreciate recommendations   - today has spiked fevers with Tmax 105.8, receiving IV Tylenol and cooling blankets   - serratia bacteremia and poss acalculous cholecystitis. B/c with gram + cocci and many enterobacter Carbapenem resistant strain and now s/p per dawn drain  - enterobacter from sputum culture 8/13 and 9/27 and serratia marcescens from sputum culture 9/14  - Blood Cultures negative  - Leukocytosis remained elevated, currently 15.28  - CT Scan 9/26 showed multifocal PNA  - currently on cefepime IV q8  - was recultured on 9/27, all cultures NGTD at this time. Please continue to monitor. Given recent febrile episodes was reculture today - ID following and appreciate recommendations   - today has spiked fevers with rectal temp Tmax 105.8  - serratia bacteremia and poss acalculous cholecystitis. B/c with gram + cocci and many enterobacter Carbapenem resistant strain and now s/p per dawn drain  - enterobacter from sputum culture 8/13 and 9/27 and serratia marcescens from sputum culture 9/14  - Blood Cultures negative  - Leukocytosis remained elevated, currently 15.28  - CT Scan 9/26 showed multifocal PNA  - currently on cefepime IV q8  - was recultured on 9/27, all cultures NGTD at this time. Please continue to monitor. Given recent febrile episodes was reculture today

## 2021-09-30 NOTE — PROGRESS NOTE ADULT - ASSESSMENT
67 YO M with a history of stage D NICM s/p HM2 on 9/2017 as DT (due to severe PAD) with TV ring, prior COVID-19 infection 4/2020, recurrent syncope post LVAD s/p ILR, and chronic abdominal pain with prior negative workup who was admitted 6/14/21 with symptomatic anemia with Hgb 4.5 in setting of INR 8.8 without hemodynamic instability and has since had a protracted hospitalization. He was transfused and underwent VCE which showed active bleeding in the mid small bowel but subsequent enteroscopy 6/15 did not reveal any active bleeding. He acutely decompensated after procedure with fever/hypertension, low flow alarms, and pulmonary infiltrate with hypoxia requiring intubation from probable aspiration PNA. He was unable to extubated and has since undergone tracheostomy but tolerating persistent trach collar and nearing ability for decannulation. 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 He underwent angiogram on 9/10, stent was unable to be placed and course was then complicated by RP bleed. He continued to have persistent leukocytosis and febrile episodes and was noted to have positive sputum culture for serratia marcescens and completed his course of abx. He was initially decannulated on 9/23.Recently he started having multiples of melena, emesis and went into acte respiratory distress and was ultimately re-intubated on 9/26.     Today he became febrile     He was also noted to be in NSVT, required re-intubated and required fluid resuscitation. He remains on pressors, which will continue to wean as tolerated. His overall prognosis is poor. Palliative care following, family meeting schedule for tomorrow afternoon.  67 YO M with a history of stage D NICM s/p HM2 on 9/2017 as DT (due to severe PAD) with TV ring, prior COVID-19 infection 4/2020, recurrent syncope post LVAD s/p ILR, and chronic abdominal pain with prior negative workup who was admitted 6/14/21 with symptomatic anemia with Hgb 4.5 in setting of INR 8.8 without hemodynamic instability and has since had a protracted hospitalization. He was transfused and underwent VCE which showed active bleeding in the mid small bowel but subsequent enteroscopy 6/15 did not reveal any active bleeding. He acutely decompensated after procedure with fever/hypertension, low flow alarms, and pulmonary infiltrate with hypoxia requiring intubation from probable aspiration PNA. He was unable to extubated and has since undergone tracheostomy but tolerating persistent trach collar and nearing ability for decannulation. 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 He underwent angiogram on 9/10, stent was unable to be placed and course was then complicated by RP bleed. He continued to have persistent leukocytosis and febrile episodes and was noted to have positive sputum culture for serratia marcescens and completed his course of abx. He was initially decannulated on 9/23.Recently he started having multiples of melena, emesis and went into acte respiratory distress and was ultimately re-intubated on 9/26.     Today he became febrile with rectal temp of 105.8 and hypotensive requiring increased amount of pressors for support. He was placed back on antibiotics and cultures were taken. His overall prognosis is poor. Will need to discuss GOC with family.

## 2021-09-30 NOTE — PROGRESS NOTE ADULT - PROBLEM SELECTOR PLAN 2
- Continue current speed of 9200 RPM. Speed increased this admission due to signs of inadequately unloaded left ventricle  - Will remain off all AC and Aspirin indefinitely given recurrent GI bleeding. Recently tried heparin trail and starting having episodes of melena   - Continue to trend LDH level  - May need to consider repeating ECHO, last ECHO was completed 7/26  - Palliative care following - Continue current speed of 9200 RPM. Speed increased this admission due to signs of inadequately unloaded left ventricle  - Will remain off all AC and Aspirin indefinitely given recurrent GI bleeding. Recently tried heparin trail and starting having episodes of melena   - Continue to trend LDH level  - May need to consider repeating ECHO, last ECHO was completed 7/26. Please repeat ECHO today.   - Palliative care following

## 2021-09-30 NOTE — PROGRESS NOTE ADULT - ATTENDING COMMENTS
Family meeting yesterday to give family update. Notably hypotensive this AM with fevers to 105. Sedated and remains chronically ill, nl JVP, LVAD hum, nontender abdomen, no pedal edema. Labs reviewed.   - abx as above  - pressors to maintain MAP >70  - c/w pall care and psychiatry for GOC/capacity  - patient previously declined PEG/PEJ or other surgeries  - prognosis guarded

## 2021-09-30 NOTE — PROGRESS NOTE ADULT - SUBJECTIVE AND OBJECTIVE BOX
Subjective: Patient seen and examined resting in bed. ON no issues.     Medications:  acetaminophen    Suspension .. 650 milliGRAM(s) Enteral Tube every 6 hours PRN  cefepime   IVPB 1000 milliGRAM(s) IV Intermittent every 8 hours  chlorhexidine 0.12% Liquid 15 milliLiter(s) Oral Mucosa two times a day  chlorhexidine 2% Cloths 1 Application(s) Topical <User Schedule>  dexMEDEtomidine Infusion 1.5 MICROgram(s)/kG/Hr IV Continuous <Continuous>  hydrALAZINE 25 milliGRAM(s) Oral every 8 hours  insulin lispro (ADMELOG) corrective regimen sliding scale   SubCutaneous every 6 hours  lidocaine   4% Patch 1 Patch Transdermal daily  methimazole 20 milliGRAM(s) Oral every 8 hours  metoclopramide Injectable 10 milliGRAM(s) IV Push every 8 hours  multivitamin 1 Tablet(s) Oral daily  norepinephrine Infusion 0.05 MICROgram(s)/kG/Min IV Continuous <Continuous>  ondansetron Injectable 4 milliGRAM(s) IV Push every 6 hours PRN  pantoprazole  Injectable 40 milliGRAM(s) IV Push every 12 hours  propofol Infusion 13.528 MICROgram(s)/kG/Min IV Continuous <Continuous>  propranolol 40 milliGRAM(s) Oral every 8 hours  simethicone 80 milliGRAM(s) Chew every 8 hours PRN  sodium chloride 0.9%. 1000 milliLiter(s) IV Continuous <Continuous>  thiamine 100 milliGRAM(s) Oral daily      Vitals:  Vital Signs Last 24 Hours  T(C): 38.4 (21 @ 12:00), Max: 40.8 (21 @ 10:30)  HR: 102 (21 @ 13:00) (74 - 114)  BP: --  RR: 47 (21 @ 13:00) (9 - 49)  SpO2: 99% (21 @ 13:00) (92% - 100%)    Weight in k.9 ( @ 00:00)    I&O's Summary    29 Sep 2021 07:01  -  30 Sep 2021 07:00  --------------------------------------------------------  IN: 1757.1 mL / OUT: 1950 mL / NET: -192.9 mL    30 Sep 2021 07:01  -  30 Sep 2021 14:09  --------------------------------------------------------  IN: 567.5 mL / OUT: 0 mL / NET: 567.5 mL        Physical Exam  General: intubated  Chest: intubated, +Vent sounds   CV: +VAD hum  Abdomen: Soft, non-distended, non-tender  Neurology: sedated    LVAD Interrogation  VAD TYPE HM 2  Speed 9200  Flow 5.8  Power 5.9  PI 4.6  Assessment of driveline exit site: driveline dressing c/d/i  Programming changes: no changes made    Labs:                        10.2   20.58 )-----------( 62       ( 30 Sep 2021 12:05 )             31.5         146<H>  |  115<H>  |  26<H>  ----------------------------<  137<H>  4.1   |  17<L>  |  0.99    Ca    9.9      30 Sep 2021 12:05  Phos  1.2       Mg     1.8         TPro  6.9  /  Alb  3.5  /  TBili  3.6<H>  /  DBili  x   /  AST  16  /  ALT  18  /  AlkPhos  195<H>                Lactate Dehydrogenase, Serum: 209 U/L ( @ 00:37)  Lactate Dehydrogenase, Serum: 216 U/L ( @ 00:49)  Lactate Dehydrogenase, Serum: 225 U/L ( @ 00:24)         Subjective: Patient seen and examined resting in bed. ON no issues.     Medications:  acetaminophen    Suspension .. 650 milliGRAM(s) Enteral Tube every 6 hours PRN  cefepime   IVPB 1000 milliGRAM(s) IV Intermittent every 8 hours  chlorhexidine 0.12% Liquid 15 milliLiter(s) Oral Mucosa two times a day  chlorhexidine 2% Cloths 1 Application(s) Topical <User Schedule>  dexMEDEtomidine Infusion 1.5 MICROgram(s)/kG/Hr IV Continuous <Continuous>  hydrALAZINE 25 milliGRAM(s) Oral every 8 hours  insulin lispro (ADMELOG) corrective regimen sliding scale   SubCutaneous every 6 hours  lidocaine   4% Patch 1 Patch Transdermal daily  methimazole 20 milliGRAM(s) Oral every 8 hours  metoclopramide Injectable 10 milliGRAM(s) IV Push every 8 hours  multivitamin 1 Tablet(s) Oral daily  norepinephrine Infusion 0.05 MICROgram(s)/kG/Min IV Continuous <Continuous>  ondansetron Injectable 4 milliGRAM(s) IV Push every 6 hours PRN  pantoprazole  Injectable 40 milliGRAM(s) IV Push every 12 hours  propofol Infusion 13.528 MICROgram(s)/kG/Min IV Continuous <Continuous>  propranolol 40 milliGRAM(s) Oral every 8 hours  simethicone 80 milliGRAM(s) Chew every 8 hours PRN  sodium chloride 0.9%. 1000 milliLiter(s) IV Continuous <Continuous>  thiamine 100 milliGRAM(s) Oral daily      ICU Vital Signs Last 24 Hrs  T(C): 38.4 (30 Sep 2021 12:00), Max: 40.8 (30 Sep 2021 10:30)  T(F): 101.2 (30 Sep 2021 12:00), Max: 105.4 (30 Sep 2021 10:30)  HR: 91 (30 Sep 2021 14:30) (74 - 114)  BP: --  BP(mean): --  ABP: 78/62 (30 Sep 2021 14:30) (28/ - 121/88)  ABP(mean): 68 (30 Sep 2021 14:30) (28 - 103)  RR: 22 (30 Sep 2021 14:30) (9 - 49)  SpO2: 100% (30 Sep 2021 14:30) (92% - 100%)    Mode: AC/ CMV (Assist Control/ Continuous Mandatory Ventilation)  RR (machine): 20  TV (machine): 500  FiO2: 40  PEEP: 5  ITime: 1  MAP: 12  PIP: 25      Weight in k.9 ( @ 00:00)    I&O's Summary    29 Sep 2021 07:  -  30 Sep 2021 07:00  --------------------------------------------------------  IN: 1757.1 mL / OUT: 1950 mL / NET: -192.9 mL    30 Sep 2021 07:01  -  30 Sep 2021 14:09  --------------------------------------------------------  IN: 567.5 mL / OUT: 0 mL / NET: 567.5 mL        Physical Exam  General: intubated  Chest: intubated, +Vent sounds   CV: +VAD hum  Abdomen: Soft, non-distended, non-tender  Neurology: sedated    LVAD Interrogation  VAD TYPE HM 2  Speed 9200  Flow 5.8  Power 5.9  PI 4.6  Assessment of driveline exit site: driveline dressing c/d/i  Programming changes: no changes made    Labs:                        10.2   20.58 )-----------( 62       ( 30 Sep 2021 12:05 )             31.5         146<H>  |  115<H>  |  26<H>  ----------------------------<  137<H>  4.1   |  17<L>  |  0.99    Ca    9.9      30 Sep 2021 12:05  Phos  1.2       Mg     1.8         TPro  6.9  /  Alb  3.5  /  TBili  3.6<H>  /  DBili  x   /  AST  16  /  ALT  18  /  AlkPhos  195<H>                Lactate Dehydrogenase, Serum: 209 U/L ( @ 00:37)  Lactate Dehydrogenase, Serum: 216 U/L ( @ 00:49)  Lactate Dehydrogenase, Serum: 225 U/L ( @ 00:24)

## 2021-09-30 NOTE — PROGRESS NOTE ADULT - PROBLEM SELECTOR PLAN 5
- postop angiogram H/H continue to drop. Intraop received 3uPRBC and post-op required additional 2uPRBC (last on 9/12).   - after starting heparin gtt, started having episodes of melena. Heparin gtt d/c and was transfused last on 9/26   - Continue to trend H/H and have active type and screen  - CT Angio 9/12 shows moderate sized left-sided retroperitoneal hematoma with intraperitoneal extension. 2 cm segment of right femoral artery occlusion with distal reconstitution.

## 2021-09-30 NOTE — PROGRESS NOTE ADULT - SUBJECTIVE AND OBJECTIVE BOX
RICKY JOINT  MRN#: [id number]  Subjective:  Pulmonary progress  : recurrent Acute hypoxic respiratory Failure ,aspiration pneumonia, NICM  ,   65M PMH ACC/AHA stage D HF due to NICM HM2 LVAD , TV annuloplasty ring 17 as destination therapy due to severe peripheral artery disease with significant stenosis  SIADH, Depression, CKD-3 with hyperkalemia, past E. coli UTIs, driveline drainage (21) and COVID-19 (back in 2020)  He was recently seen in clinic where he complained of abdominal pain and dark stools w constipation back in May. He presents to [facility] ER today weakness and fatigue, moderate and + Black stools for three days, on coumadin secondary to warfarin use in the setting of an LVAD. Patient has required transfusions for GIB in the past. Mostly recently back in 2021 pt had anemia with dark stools. No interventions was done at that time. However Last Endoscopy was done in 2020 (negative). Today labs show patient is anemic with H/H of 4.5/16.3,. INR is 8.84 MAP in the 90s, Temp 35.1. He denies any chest pain, shortness of breath, dizziness, abd pain, nausea or vomiting. found to have  rectal bleeding underwent endoscopy ,old blood in the proximal ileum ,  develop sepsis with LL opacity given Antibiotics , Extubated , reintubated , Bronchoscopy on Zosyn for LL pneumonia  and Amiodarone S/P TV Annuloplasty , patient remain intubated on full ventilatory support .S/P multiple units of blood transfusion , remain on full ventilatory support on Precedex and propofol , new central IJ line , diarrhea C diff. +ve on po vancomycin and IV Flagyl,  mildly distended belly , fever start on cefepime 2gm q 8 hrs S/P tracheostomy .  new RT Subclavian central line continue on contact  isolation ,S/P  C-diff antibiotics, no more diarrhea back on full support mechanical ventilator , chest x ray show improvement in LLL air space disease, more awake and responsive on tube feeding no more diarrhea ,  no nausea or vomiting or diarrhea still very weak and tired , back on tube feeding ,still on po vancomycin , getting PT and OT at bed side ,   , no SOB getting stronger , improve muscle tone patient transfer to monitor bed still on contact isolation for C-Difficel colitis on 50% FI02, and change to Suresh  tube feeding still loose stool . H/H drop significantly require blood transfusion , most likely GI bleeding , IV heparin D/C ,  H/H is stable ., patient develop TR sided  pneumothorax require chest tube placement , RT IJ central line  placed , develop fever shaking chills , blood culture positive for serratia marcescens , start on cefepime .the patient  become hypoxic and hypotensive placed on full ventilatory support and Vasopressin , levophed and Dobutamine ,S/P blood transfusion on meropenem and vancomycin ,   , on and  off pressors , occasional agitation on Precedex .S/P IR cholecystostomy tube drainage placement in the RT upper Quadrant , resume anticoagulation chest x ray noted C-PAP trail lasted only for 2 hrs , new RT SC line and D/C RT IJ line , RT pig tail cathter has been removed , tolerating C-PAP trial placed on trach. collar 50% FI02 GI consultant noted on NG tube feeding as tolerated , develop AF RVR S/P  bolus Amiodarone  back to regular sinus Rhythm , Flat Affect depressed , back on tube feeding Vital AF at 60 cc/ hr .still intermittent abdominal pain , no fever saturation is accepted  back on full ventilatory support ,   on methimazole for hyperthyroidism ,  condition is the same ,still C/O Abdominal pain , white count is improving , no chest pain or SOB ,  .placed on Reglan 10 mg TID for gastric motility , depressed , withdrawn. , S/P  mesenteric angiogram , unable to stent SMA S/P 3 units of blood transfusion   RT IJ in place reassessed for stent in the SMA by vascular,  dark stool stable H/H ,surgical opinion for possible Lap cholecystectomy. over night events noted develop respiratory distress, , rectal bleeding, require intubation placed on full ventilatory support , FI02 is 50% also became hemianosmically unstable require triple pressor support with levophed , vasopressin and norsynephrine, pan culture placed  on Vancomycin IV and PO  and Zosyn, sedated with propofol kept NPO , new central line RT and left IJ cathter placed . Diflucan Added .fever of 38.5 weaned on  levophed  vasopressin , sedated on propofol , all culture are negative, resume tube feeding , BP fluctuate lethargic , white count is improving , on Zosyn antibiotic      (2021 16:57)    PAST MEDICAL & SURGICAL HISTORY:  CHF (congestive heart failure)    CAD (coronary artery disease)  Depression    Pleural effusion    History of 2019 novel coronavirus disease (COVID-19)  2020    Hemorrhoids    Bleeding hemorrhoids    Peripheral arterial disease    Claudication    BPH with urinary obstruction    ACC/AHA stage D systolic heart failure  Anticoagulation goal of INR 2.0 to 2.5    Falls    Clavicle fracture    CKD (chronic kidney disease), stage III    Iron deficiency anemia    H/O epistaxis    Vertigo    GI bleed    S/P TVR (tricuspid valve repair)    S/P ventricular assist device    S/P endoscopy    OBJECTIVE:  ICU Vital Signs Last 24 Hrs  T(C): 38.2 (2021 10:00), Max: 38.5 (2021 12:00)  T(F): 100.8 (2021 10:00), Max: 101.3 (2021 12:00)  HR: 65 (2021 10:00) (61 - 69)  BP: --  BP(mean): --  ABP: 105/67 (2021 10:00) (90/54 - 113/64)  ABP(mean): 77 (2021 10:00) (63 - 77)  RR: 20 (2021 10:00) (19 - 35)  SpO2: 99% (2021 10:00) (96% - 100%)       @ 07: @ 07:00  --------------------------------------------------------  IN: 2693.9 mL / OUT: 1415 mL / NET: 1278.9 mL     @ 07: @ 10:49  --------------------------------------------------------  IN: 420.8 mL / OUT: 115 mL / NET: 305.8 mL  PHYSICAL EXAM: Daily   Elderly male intubated on full ventilatory support FI02 is 40%  support on Levophed and vasopressin   Daily Weight in k.4 (2021 00:00)  HEENT:     + NCAT  + EOMI  - Conjuctival edema   - Icterus   - Thrush   - ETT  + NGT/OGT  Neck:         + FROM  RT IJ , LT IJ  lines JVD  - Nodes - Masses + Mid-line trachea - Tracheostomy  Chest:            normal A-P diameter    Lungs:          + CTA   + Rhonchi    - Rales    - Wheezing + Decreased  LT BS   - Dullness R L  Cardiac:       + S1 + S2    + RRR   - Irregular   - S3  - S4    - Murmurs   - Rub   - Hamman’s sign   Abdomen:    + BS  + Soft + Non-tender - Distended - Organomegaly - PEG .cholecystostomy tube in place  Extremities:   - Cyanosis U/L   - Clubbing  U/L  + LE/UE Edema   + Capillary refill    + Pulses   Neuro:        - Awake   -  Alert   - Confused   - Lethargic   + Sedated  + Generalized Weakness  Skin:        - Rashes    - Erythema   + Normal incisions   + IV sites intact          HOSPITAL MEDICATIONS: All medications reviewed and analyzed  MEDICATIONS  (STANDING):  amiodarone    Tablet 200 milliGRAM(s) Oral daily  chlorhexidine 0.12% Liquid 15 milliLiter(s) Oral Mucosa every 12 hours  chlorhexidine 2% Cloths 1 Application(s) Topical <User Schedule>  dexmedetomidine Infusion 0.5 MICROgram(s)/kG/Hr (9.81 mL/Hr) IV Continuous <Continuous>  dextrose 50% Injectable 50 milliLiter(s) IV Push every 15 minutes  heparin  Infusion 400 Unit(s)/Hr (12.5 mL/Hr) IV Continuous <Continuous>  Hydromorphone  Injectable 0.5 milliGRAM(s) IV Push once  insulin lispro (ADMELOG) corrective regimen sliding scale   SubCutaneous every 6 hours  pantoprazole  Injectable 40 milliGRAM(s) IV Push every 12 hours  piperacillin/tazobactam IVPB.. 3.375 Gram(s) IV Intermittent every 8 hours  propofol Infusion 20 MICROgram(s)/kG/Min (9.42 mL/Hr) IV Continuous <Continuous>  sodium chloride 0.9% lock flush 3 milliLiter(s) IV Push every 8 hours  sodium chloride 0.9%. 1000 milliLiter(s) (10 mL/Hr) IV Continuous <Continuous>    MEDICATIONS  (PRN):  acetaminophen    Suspension .. 650 milliGRAM(s) Oral every 6 hours PRN Temp greater or equal to 38C (100.4F)    LABS: All Lab data reviewed and analyzed                                   10.6   15.28 )-----------( 97       ( 30 Sep 2021 00:43 )             34.4          145  |  112<H>  |  19  ----------------------------<  168<H>  3.8   |  20<L>  |  0.46<L>    Ca    10.3      30 Sep 2021 00:37  Phos  2.5       Mg     1.5         TPro  7.2  /  Alb  3.5  /  TBili  3.6<H>  /  DBili  2.4<H>  /  AST  17  /  ALT  22  /  AlkPhos  213<H>        Ca    10.8<H>      29 Sep 2021 00:49  Phos  3.8       Mg     1.7         TPro  7.6  /  Alb  3.8  /  TBili  1.8<H>  /  DBili  x   /  AST  14  /  ALT  21  /  AlkPhos  167<H>      Ca    10.7<H>      28 Sep 2021 00:24  Phos  1.8       Mg     2.0         TPro  7.5  /  Alb  4.1  /  TBili  2.1<H>  /  DBili  x   /  AST  19  /  ALT  23  /  AlkPhos  136<H>                                                                                                                                                                PTT - ( 2021 04:52 )  PTT:45.2 sec LIVER FUNCTIONS - ( 2021 00:42 )  Alb: 3.4 g/dL / Pro: 6.7 g/dL / ALK PHOS: 213 U/L / ALT: 15 U/L / AST: 24 U/L / GGT: x           RADIOLOGY: - Reviewed and analyzed RT Pig tail cathter  , LVAD HM2, CT scan of abdomen reviewed result noted

## 2021-09-30 NOTE — PROGRESS NOTE ADULT - SUBJECTIVE AND OBJECTIVE BOX
RICKY HAMPTON  MRN-[id number]  Patient is a 66y old  Male who presents with a chief complaint of Anemia, Supratherapeutic INR, Dark Stools (29 Sep 2021 22:31)    HPI:  65M PMH ACC/AHA stage D HF due to NICM HM2 LVAD , TV annuloplasty ring 17 as destination therapy due to severe peripheral artery disease with significant stenosis  SIADH, Depression, CKD-3 with hyperkalemia, past E. coli UTIs, driveline drainage (21) and COVID-19 (back in 2020)  He was recently seen in clinic where he complained of abdominal pain and dark stools w constipation back in May. He presents to [facility] ER today weakness and fatigue, moderate and + Black stools for three days, on coumadin secondary to warfarin use in the setting of an LVAD. Patient has required transfusions for GIB in the past. Mostly recently back in 2021 pt had anemia with dark stools. No interventions was done at that time. However Last Endoscopy was done in 2020 (negative). Today labs show patient is anemic with H/H of 4.5/16.3,. INR is 8.84 MAP in the 90s, Temp 35.1. He denies any chest pain, shortness of breath, dizziness, abd pain, nausea or vomiting.       (2021 16:57)      Surgery/Hospital Course:   admit for melena w/ anemia, INR 8.84   6/15 Capsul study (+) for small bowel bleed, balloon endoscopy (old blood in prox ileum); post EGD - septic w/ L opacity, re-intubated for concern for aspiration, TTE (Mod MR, decrease biV w/ interventricular septum boweing towards R)   bronch    +C Diff    CT C/A/P: Fluid filled colon which may be 2/2 rapid transit. Small bilateral pleffs with associates. Compressive atelectasis New ISABELLE & LLL  parenchymal opacities, suspicious for pneumonia. 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    #8 Shiley trach at bedside    LVAD speed increased to 9200   Bronch   TC since . Patient transferred to SDU.    INR today 2.64.  H&H 7.3/24 this AM.  Will repeat CBC at noon, and will send stool guaiac Patient with persistent abdominal tenderness, rate of tube feeds decreased.  No nausea/vomiting.     INR today 2.4. H&H 9.1/28.6 low flow overnight /N&V, refusing Tube feeds on D5 1/2 normal  @50 cc/hr   INR 2.69  H&H 7.7/.1 refusing Tube feeds on D5 1/2 normal  @50 cc/hr. This am + BM Melena Dr Oneill HF  aware- PRBC x1  GI team consulted -  NPO  plan on study in am-  D/w Dr Cadet Patient  to return to CTU for further management; 1PRBCS    Post op INR 2.2 today.  No bleeding. BC + for SM.  Pt is hypotensive requiring pressor and inotropic support.  ID follow up today on Cefepime will follow.   R PTC for PTX    CT C/A/P: sub q emphysema in R chest wall, GGO RUL, small ascites CTH negative; Abd US: GB thickening, pericholecystic fluid     Perchole drain in place continues to drain total output overnight 133.  Fever today 38.8    duplex LE negative    Patient with persistent abdominal pain, refusing tube feeds and medications, Psych consulted   CTA A/P ordered to r/o mesenteric ischemia 2/2 persistent anorexia, nausea, vomiting. Revealed:  Evaluation of the mesenteric vessels is limited by streak artifact from LVAD. There appears to be severe stenosis of the proximal SMA; abdominal mesenteric doppler is recommended for further evaluation. 2.  No small bowel findings to suggest acute mesenteric ischemia. 3.  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  8/15: Cultures resulted BC 1/2 +GPC in clusters, SC enterobacter; mesenteric duplex: borderline stenosis of proximal SMA  : CT C/A/P noncon: Nondular opacities in R lung apex w/cavitation, abd nl  :  Continue current care, treatment of thyrotoxicosis with medications as per endocrine, d/c ABX as per team.    RUQ sono: Contracted gallbladder with cholecystostomy tube in place.  9/10 failed SMA stent, L fem PSA, s/p 3U PRCB   CTA Abd/Pelvis L RP hematoma    Trach decannulated    intubated fro resp distress for increased WOB, LL pneumonia; CT C/A/P: progressive ISABELLE opacities and L consolidations, multifocal pneumonia     Today:  Sedated with IV Precedex for vent synchrony, IV Propofol weaned to off this AM. Hypotensive this AM, restarted pressor support with IV Levophed. Restarted Zosyn for coverage. Check Free T4 and total T3 today as per endocrine recommendations.     REVIEW OF SYSTEMS:  Unable to obtain, pt intubated and sedated     ICU Vital Signs Last 24 Hrs  T(C): 36.5 (30 Sep 2021 08:00), Max: 37.5 (29 Sep 2021 12:00)  T(F): 97.7 (30 Sep 2021 08:00), Max: 99.5 (29 Sep 2021 12:00)  HR: 90 (30 Sep 2021 09:29) (74 - 102)  BP: --  BP(mean): --  ABP: 60/49 (30 Sep 2021 08:00) (60/49 - 121/88)  ABP(mean): 53 (30 Sep 2021 08:00) (53 - 103)  RR: 18 (30 Sep 2021 08:00) (9 - 28)  SpO2: 99% (30 Sep 2021 09:29) (97% - 100%)      Physical Exam:  Gen:  intubated   CNS: moves all extremities to command on low dose sedation   Neck: no JVD, gauze over trach wound  RES : coarse breath sounds, no wheezing    CVS: +LVAD hum   Abd: Soft. Sybil drain with bilious fluid. Positive BS throughout. Mild RUQ abdominal tenderness by perc sybil.  Skin: No rash, erythema, cyanosis.  Vasc: Warm and well-perfused.  Ext:  no edema    ============================I/O===========================   I&O's Detail    29 Sep 2021 07:01  -  30 Sep 2021 07:00  --------------------------------------------------------  IN:    Dexmedetomidine: 531.4 mL    Enteral Tube Flush: 175 mL    IV PiggyBack: 50 mL    Miscellaneous Tube Feedin mL    Propofol: 120.7 mL    sodium chloride 0.9%: 240 mL  Total IN: 1757.1 mL    OUT:    Drain (mL): 350 mL    Voided (mL): 1600 mL  Total OUT: 1950 mL    Total NET: -192.9 mL      30 Sep 2021 07:01  -  30 Sep 2021 11:04  --------------------------------------------------------  IN:    Albumin 5%  - 250 mL: 250 mL    Dexmedetomidine: 46.2 mL    Miscellaneous Tube Feedin mL    Propofol: 14.6 mL    sodium chloride 0.9%: 20 mL  Total IN: 410.8 mL    OUT:  Total OUT: 0 mL    Total NET: 410.8 mL        ============================ LABS =========================                        10.6   15.28 )-----------( 97       ( 30 Sep 2021 00:43 )             34.4         145  |  112<H>  |  19  ----------------------------<  168<H>  3.8   |  20<L>  |  0.46<L>    Ca    10.3      30 Sep 2021 00:37  Phos  2.5       Mg     1.5         TPro  7.2  /  Alb  3.5  /  TBili  3.6<H>  /  DBili  2.4<H>  /  AST  17  /  ALT  22  /  AlkPhos  213<H>      LIVER FUNCTIONS - ( 30 Sep 2021 00:37 )  Alb: 3.5 g/dL / Pro: 7.2 g/dL / ALK PHOS: 213 U/L / ALT: 22 U/L / AST: 17 U/L / GGT: x             ABG - ( 30 Sep 2021 10:34 )  pH, Arterial: 7.39  pH, Blood: x     /  pCO2: 31    /  pO2: 90    / HCO3: 19    / Base Excess: -5.3  /  SaO2: 98.8                ======================Micro/Rad/Cardio=================  Culture: Reviewed   CXR: Reviewed  Echo:Reviewed  ======================================================  PAST MEDICAL & SURGICAL HISTORY:  CHF (congestive heart failure)    CAD (coronary artery disease)    Depression    Pleural effusion    History of 2019 novel coronavirus disease (COVID-19)  2020    Hemorrhoids    Bleeding hemorrhoids    Peripheral arterial disease    Claudication    BPH with urinary obstruction    ACC/AHA stage D systolic heart failure    Anticoagulation goal of INR 2.0 to 2.5    Falls    Clavicle fracture    CKD (chronic kidney disease), stage III    Iron deficiency anemia    H/O epistaxis    Vertigo    GI bleed    S/P TVR (tricuspid valve repair)    S/P ventricular assist device    S/P endoscopy      ====================ASSESSMENT ==============  Stage D Nonischemic Cardiomyopathy, Status Post HM2 on 2017    Cardiogenic shock  Hemodynamic instability   Acute hypoxemic respiratory failure s/p trach , decannulated on ; Reintubated on    GI bleed , Status Post Enteroscopy   Anemia, in setting of melena   Chronic Kidney Disease  Stress hyperglycemia   C.diff positive on    Hypovolemic shock  Septic shock  Leukocytosis  GB thickening/percholecystic s/p perc choley by IT   SMA stenosis  Serratia/citrobacter pneumonia   Stenotrophomonas pneumonia   Enterobacter pneumonia   Nasuea/vomiting  Deconditioning    Plan:  ====================== NEUROLOGY=====================  Sedated with IV Precedex for vent synchrony, IV Propofol weaned to off this AM   Tylenol PRN for analgesia     acetaminophen    Suspension .. 650 milliGRAM(s) Enteral Tube every 6 hours PRN Mild Pain (1 - 3)  dexMEDEtomidine Infusion 1.5 MICROgram(s)/kG/Hr (23.1 mL/Hr) IV Continuous <Continuous>  propofol Infusion 13.528 MICROgram(s)/kG/Min (5 mL/Hr) IV Continuous <Continuous>    ==================== RESPIRATORY======================  Acute hypoxemic respiratory failure s/p #8 shiley trach on ; decannulated on ; Reintubated on    Continue close monitoring of respiratory rate and breathing pattern, following of ABG’s with A-line monitoring, continuous pulse oximetry monitoring.   Planning for re-trach     Mechanical Ventilation:  Mode: AC/ CMV (Assist Control/ Continuous Mandatory Ventilation)  RR (machine): 20  TV (machine): 500  FiO2: 40  PEEP: 5  ITime: 1  MAP: 12  PIP: 25      ====================CARDIOVASCULAR==================  Stage D Nonischemic Cardiomyopathy, Status Post HM2 on 2017; LVAD settings 9200, flow 5.7   TTE : EF 12%, mild-mod AR, mild-mod TR, severe global LV systolic dysfxn, RV enlargement with decreased fxn  Propranolol for rate control of HR 2/2 Hyperthyroidism, goal HR 60-80  Hypotensive this AM, restarted pressor support with IV Levophed   Hydralazine for afterload reduction     hydrALAZINE 25 milliGRAM(s) Oral every 8 hours  propranolol 40 milliGRAM(s) Oral every 8 hours    ===================HEMATOLOGIC/ONC ===================  CTA A/P on  +L RP hematoma   Acute blood loss anemia, monitor H&H/Plts    Holding coumadin and ASA given recurrent GI bleeding, continue monitoring LDH     ===================== RENAL =========================  Continue monitoring urine output, I&OS, BUN/Cr   Euvolemic, even fluid balance, currently off diuretics.    Replete lytes PRN. Keep K> 4 and Mg >2     ==================== GASTROINTESTINAL===================  S/p cholecystostomy tube placed on  with IR  Tolerating TFs, Vital 1.5 at 40cc/hr   CTA A/P : Evaluation of the mesenteric vessels is limited by streak artifact from LVAD. There appears to be severe stenosis of the proximal SMA; abdominal mesenteric doppler is recommended for further evaluation. 2.  No small bowel findings to suggest acute mesenteric ischemia. 3.  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  Mesenteric duplex on 8/15 borderline stenosis of proximal SMA, s/p failed SMA stent, L fem RP hematoma on 9/10; No intervention for cholecystostomy tube, PEJ  Reglan for gut motility  Zofran PRN nausea/vomiting    multivitamin 1 Tablet(s) Oral daily  GI prophylaxis, pantoprazole  Injectable 40 milliGRAM(s) IV Push every 12 hours  simethicone 80 milliGRAM(s) Chew every 8 hours PRN Gas  sodium chloride 0.9%. 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   Per endocrine      - Thyroid US when trach is out      - c/w Methimazole, adjust based on labs        - Check Free T4 and total T3 today        - Propranolol as above     insulin lispro (ADMELOG) corrective regimen sliding scale   SubCutaneous every 6 hours  methimazole 20 milliGRAM(s) Oral every 8 hours    ========================INFECTIOUS DISEASE================  Afebrile, WBC downtrending 28.31->17.63   Continue trending WBC and monitoring fever curve   CT C/A/P : progressive ISABELLE opacities and L consolidations, multifocal pneumonia  SCx  +Enterobacter cloacae complex  Restarted Zosyn for coverage this AM     piperacillin/tazobactam IVPB.. 3.375 Gram(s) IV Intermittent every 8 hours      Patient requires continuous monitoring with bedside rhythm monitoring, pulse ox monitoring, and intermittent blood gas analysis. Care plan discussed with ICU care team. Patient remained critical and at risk for life threatening decompensation.     By signing my name below, I, Agnieszka Cherry, attest that this documentation has been prepared under the direction and in the presence of CLAUDIA Cruz   Electronically signed: Cesia Shaffer, 21 @ 11:04    I, Priscilla Amos, personally performed the services described in this documentation. all medical record entries made by the luisibmel were at my direction and in my presence. I have reviewed the chart and agree that the record reflects my personal performance and is accurate and complete  Electronically signed: CLAUDIA Cruz          RICKY HAMPTON  MRN-[id number]  Patient is a 66y old  Male who presents with a chief complaint of Anemia, Supratherapeutic INR, Dark Stools (29 Sep 2021 22:31)    HPI:  65M PMH ACC/AHA stage D HF due to NICM HM2 LVAD , TV annuloplasty ring 17 as destination therapy due to severe peripheral artery disease with significant stenosis  SIADH, Depression, CKD-3 with hyperkalemia, past E. coli UTIs, driveline drainage (21) and COVID-19 (back in 2020)  He was recently seen in clinic where he complained of abdominal pain and dark stools w constipation back in May. He presents to [facility] ER today weakness and fatigue, moderate and + Black stools for three days, on coumadin secondary to warfarin use in the setting of an LVAD. Patient has required transfusions for GIB in the past. Mostly recently back in 2021 pt had anemia with dark stools. No interventions was done at that time. However Last Endoscopy was done in 2020 (negative). Today labs show patient is anemic with H/H of 4.5/16.3,. INR is 8.84 MAP in the 90s, Temp 35.1. He denies any chest pain, shortness of breath, dizziness, abd pain, nausea or vomiting.       (2021 16:57)      Surgery/Hospital Course:   admit for melena w/ anemia, INR 8.84   6/15 Capsul study (+) for small bowel bleed, balloon endoscopy (old blood in prox ileum); post EGD - septic w/ L opacity, re-intubated for concern for aspiration, TTE (Mod MR, decrease biV w/ interventricular septum boweing towards R)   bronch    +C Diff    CT C/A/P: Fluid filled colon which may be 2/2 rapid transit. Small bilateral pleffs with associates. Compressive atelectasis New ISABELLE & LLL  parenchymal opacities, suspicious for pneumonia. 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    #8 Shiley trach at bedside    LVAD speed increased to 9200   Bronch   TC since . Patient transferred to SDU.    INR today 2.64.  H&H 7.3/24 this AM.  Will repeat CBC at noon, and will send stool guaiac Patient with persistent abdominal tenderness, rate of tube feeds decreased.  No nausea/vomiting.     INR today 2.4. H&H 9.1/28.6 low flow overnight /N&V, refusing Tube feeds on D5 1/2 normal  @50 cc/hr   INR 2.69  H&H 7.7/.1 refusing Tube feeds on D5 1/2 normal  @50 cc/hr. This am + BM Melena Dr Oneill HF  aware- PRBC x1  GI team consulted -  NPO  plan on study in am-  D/w Dr Cadet Patient  to return to CTU for further management; 1PRBCS    Post op INR 2.2 today.  No bleeding. BC + for SM.  Pt is hypotensive requiring pressor and inotropic support.  ID follow up today on Cefepime will follow.   R PTC for PTX    CT C/A/P: sub q emphysema in R chest wall, GGO RUL, small ascites CTH negative; Abd US: GB thickening, pericholecystic fluid     Perchole drain in place continues to drain total output overnight 133.  Fever today 38.8    duplex LE negative    Patient with persistent abdominal pain, refusing tube feeds and medications, Psych consulted   CTA A/P ordered to r/o mesenteric ischemia 2/2 persistent anorexia, nausea, vomiting. Revealed:  Evaluation of the mesenteric vessels is limited by streak artifact from LVAD. There appears to be severe stenosis of the proximal SMA; abdominal mesenteric doppler is recommended for further evaluation. 2.  No small bowel findings to suggest acute mesenteric ischemia. 3.  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  8/15: Cultures resulted BC 1/2 +GPC in clusters, SC enterobacter; mesenteric duplex: borderline stenosis of proximal SMA  : CT C/A/P noncon: Nondular opacities in R lung apex w/cavitation, abd nl  :  Continue current care, treatment of thyrotoxicosis with medications as per endocrine, d/c ABX as per team.    RUQ sono: Contracted gallbladder with cholecystostomy tube in place.  9/10 failed SMA stent, L fem PSA, s/p 3U PRCB   CTA Abd/Pelvis L RP hematoma    Trach decannulated    intubated fro resp distress for increased WOB, LL pneumonia; CT C/A/P: progressive ISABELLE opacities and L consolidations, multifocal pneumonia     Today:  Sedated with IV Precedex for vent synchrony, IV Propofol weaned to off this AM. Hypotensive this AM, restarted pressor support with IV Levophed. Restarted Zosyn for coverage. Check Free T4 and total T3 today as per endocrine recommendations.     REVIEW OF SYSTEMS:  Unable to obtain, pt intubated and sedated     ICU Vital Signs Last 24 Hrs  T(C): 36.5 (30 Sep 2021 08:00), Max: 37.5 (29 Sep 2021 12:00)  T(F): 97.7 (30 Sep 2021 08:00), Max: 99.5 (29 Sep 2021 12:00)  HR: 90 (30 Sep 2021 09:29) (74 - 102)  BP: --  BP(mean): --  ABP: 60/49 (30 Sep 2021 08:00) (60/49 - 121/88)  ABP(mean): 53 (30 Sep 2021 08:00) (53 - 103)  RR: 18 (30 Sep 2021 08:00) (9 - 28)  SpO2: 99% (30 Sep 2021 09:29) (97% - 100%)      Physical Exam:  Gen:  intubated   CNS: moves all extremities to command on low dose sedation   Neck: no JVD, gauze over trach wound  RES : coarse breath sounds, no wheezing    CVS: +LVAD hum   Abd: Soft. Sybil drain with bilious fluid. Positive BS throughout. Mild RUQ abdominal tenderness by perc sybil.  Skin: No rash, erythema, cyanosis.  Vasc: Warm and well-perfused.  Ext:  no edema    ============================I/O===========================   I&O's Detail    29 Sep 2021 07:01  -  30 Sep 2021 07:00  --------------------------------------------------------  IN:    Dexmedetomidine: 531.4 mL    Enteral Tube Flush: 175 mL    IV PiggyBack: 50 mL    Miscellaneous Tube Feedin mL    Propofol: 120.7 mL    sodium chloride 0.9%: 240 mL  Total IN: 1757.1 mL    OUT:    Drain (mL): 350 mL    Voided (mL): 1600 mL  Total OUT: 1950 mL    Total NET: -192.9 mL      30 Sep 2021 07:01  -  30 Sep 2021 11:04  --------------------------------------------------------  IN:    Albumin 5%  - 250 mL: 250 mL    Dexmedetomidine: 46.2 mL    Miscellaneous Tube Feedin mL    Propofol: 14.6 mL    sodium chloride 0.9%: 20 mL  Total IN: 410.8 mL    OUT:  Total OUT: 0 mL    Total NET: 410.8 mL        ============================ LABS =========================                        10.6   15.28 )-----------( 97       ( 30 Sep 2021 00:43 )             34.4         145  |  112<H>  |  19  ----------------------------<  168<H>  3.8   |  20<L>  |  0.46<L>    Ca    10.3      30 Sep 2021 00:37  Phos  2.5       Mg     1.5         TPro  7.2  /  Alb  3.5  /  TBili  3.6<H>  /  DBili  2.4<H>  /  AST  17  /  ALT  22  /  AlkPhos  213<H>      LIVER FUNCTIONS - ( 30 Sep 2021 00:37 )  Alb: 3.5 g/dL / Pro: 7.2 g/dL / ALK PHOS: 213 U/L / ALT: 22 U/L / AST: 17 U/L / GGT: x             ABG - ( 30 Sep 2021 10:34 )  pH, Arterial: 7.39  pH, Blood: x     /  pCO2: 31    /  pO2: 90    / HCO3: 19    / Base Excess: -5.3  /  SaO2: 98.8                ======================Micro/Rad/Cardio=================  Culture: Reviewed   CXR: Reviewed  Echo:Reviewed  ======================================================  PAST MEDICAL & SURGICAL HISTORY:  CHF (congestive heart failure)    CAD (coronary artery disease)    Depression    Pleural effusion    History of 2019 novel coronavirus disease (COVID-19)  2020    Hemorrhoids    Bleeding hemorrhoids    Peripheral arterial disease    Claudication    BPH with urinary obstruction    ACC/AHA stage D systolic heart failure    Anticoagulation goal of INR 2.0 to 2.5    Falls    Clavicle fracture    CKD (chronic kidney disease), stage III    Iron deficiency anemia    H/O epistaxis    Vertigo    GI bleed    S/P TVR (tricuspid valve repair)    S/P ventricular assist device    S/P endoscopy      ====================ASSESSMENT ==============  Stage D Nonischemic Cardiomyopathy, Status Post HM2 on 2017    Cardiogenic shock  Hemodynamic instability   Acute hypoxemic respiratory failure s/p trach , decannulated on ; Reintubated on    GI bleed , Status Post Enteroscopy   Anemia, in setting of melena   Chronic Kidney Disease  Stress hyperglycemia   C.diff positive on    Hypovolemic shock  Septic shock  Leukocytosis  GB thickening/percholecystic s/p perc choley by IT   SMA stenosis  Serratia/citrobacter pneumonia   Stenotrophomonas pneumonia   Enterobacter pneumonia   Nasuea/vomiting  Deconditioning    Plan:  ====================== NEUROLOGY=====================  Sedated with IV Precedex for vent synchrony, IV Propofol weaned to off this AM secondary to acute hypotension   Tylenol PRN for analgesia     acetaminophen    Suspension .. 650 milliGRAM(s) Enteral Tube every 6 hours PRN Mild Pain (1 - 3)  dexMEDEtomidine Infusion 1.5 MICROgram(s)/kG/Hr (23.1 mL/Hr) IV Continuous <Continuous>  propofol Infusion 13.528 MICROgram(s)/kG/Min (5 mL/Hr) IV Continuous <Continuous>    ==================== RESPIRATORY======================  Acute hypoxemic respiratory failure s/p #8 shiley trach on ; decannulated on ; Reintubated on    Continue close monitoring of respiratory rate and breathing pattern, following of ABG’s with A-line monitoring, continuous pulse oximetry monitoring.   Planning for re-trach, potentially tomorrow pending family meeting     Mechanical Ventilation:  Mode: AC/ CMV (Assist Control/ Continuous Mandatory Ventilation)  RR (machine): 20  TV (machine): 500  FiO2: 40  PEEP: 5  ITime: 1  MAP: 12  PIP: 25      ====================CARDIOVASCULAR==================  Stage D Nonischemic Cardiomyopathy, Status Post HM2 on 2017; LVAD settings 9200, flow 5.7   TTE : EF 12%, mild-mod AR, mild-mod TR, severe global LV systolic dysfxn, RV enlargement with decreased fxn  Propranolol for rate control of HR 2/2 Hyperthyroidism, goal HR 60-80  Hypotensive this AM, restarted pressor support with IV Levophed and Vasopressin  Hydralazine for afterload reduction, currently on hold   WCT this AM in setting of hypokalemia, supplemented     hydrALAZINE 25 milliGRAM(s) Oral every 8 hours  propranolol 40 milliGRAM(s) Oral every 8 hours    ===================HEMATOLOGIC/ONC ===================  CTA A/P on  +L RP hematoma   Acute blood loss anemia, monitor H&H/Plts    Platelets downtrending, possible in setting of sepsis   Holding coumadin and ASA given recurrent GI bleeding, continue monitoring LDH     ===================== RENAL =========================  Continue monitoring urine output, I&OS, BUN/Cr   Euvolemic, even fluid balance, currently off diuretics.    Replete lytes PRN. Keep K> 4 and Mg >2     ==================== GASTROINTESTINAL===================  S/p cholecystostomy tube placed on  with IR  Tube feeds held this AM 2/2 hypotension and suspected septic shock   CTA A/P : Evaluation of the mesenteric vessels is limited by streak artifact from LVAD. There appears to be severe stenosis of the proximal SMA; abdominal mesenteric doppler is recommended for further evaluation. 2.  No small bowel findings to suggest acute mesenteric ischemia. 3.  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  Mesenteric duplex on 8/15 borderline stenosis of proximal SMA, s/p failed SMA stent, L fem RP hematoma on 9/10; No intervention for cholecystostomy tube, PEJ  Reglan for gut motility  Zofran PRN nausea/vomiting    multivitamin 1 Tablet(s) Oral daily  GI prophylaxis, pantoprazole  Injectable 40 milliGRAM(s) IV Push every 12 hours  simethicone 80 milliGRAM(s) Chew every 8 hours PRN Gas  sodium chloride 0.9%. 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   Per endocrine      - Thyroid US when trach is out      - c/w Methimazole, adjust based on labs        - Check Free T4 and total T3 today        - Propranolol as above     insulin lispro (ADMELOG) corrective regimen sliding scale   SubCutaneous every 6 hours  methimazole 20 milliGRAM(s) Oral every 8 hours    ========================INFECTIOUS DISEASE================  Afebrile, WBC downtrending 28.31->17.63   Continue trending WBC and monitoring fever curve   CT C/A/P : progressive ISABELLE opacities and L consolidations, multifocal pneumonia  SCx  +Enterobacter cloacae complex  Restarted Cefepime for coverage this AM   Blood cultures sent     piperacillin/tazobactam IVPB.. 3.375 Gram(s) IV Intermittent every 8 hours      Patient requires continuous monitoring with bedside rhythm monitoring, pulse ox monitoring, and intermittent blood gas analysis. Care plan discussed with ICU care team. Patient remained critical and at risk for life threatening decompensation.     By signing my name below, I, Agnieszka Cherry, attest that this documentation has been prepared under the direction and in the presence of CLAUDIA Cruz   Electronically signed: Cesia Shaffer, 21 @ 11:04    I, Priscilla Amos, personally performed the services described in this documentation. all medical record entries made by the luisibmel were at my direction and in my presence. I have reviewed the chart and agree that the record reflects my personal performance and is accurate and complete  Electronically signed: CLAUDIA Cruz

## 2021-10-01 NOTE — CONSULT NOTE ADULT - NSCONSULTADDITIONALINFOA_GEN_ALL_CORE
Please do not hesitate to contact us in case of any queries or concerns via pager [phone] or [employer] Teams.    Jia Kelly, PGY-5  Hospice and Palliative Care Medicine Fellow
Thank you for involving our team in the comprehensive care of this patient. We will sign off at this time but feel free to re-consult in case of any queries or concerns.    Please do not hesitate to contact us in case of any queries or concerns via pager [phone] or [employer] Teams.    Jia Kelly, PGY-5  Hospice and Palliative Care Medicine Fellow

## 2021-10-01 NOTE — CONSULT NOTE ADULT - PROBLEM SELECTOR PROBLEM 4
Anemia
Advance care planning
Gastrointestinal hemorrhage, unspecified gastrointestinal hemorrhage type
Functional quadriplegia

## 2021-10-01 NOTE — PROGRESS NOTE ADULT - SUBJECTIVE AND OBJECTIVE BOX
Chief Complaint:     History:    MEDICATIONS  (STANDING):  albumin human  5% IVPB 250 milliLiter(s) IV Intermittent once  chlorhexidine 0.12% Liquid 15 milliLiter(s) Oral Mucosa two times a day  chlorhexidine 2% Cloths 1 Application(s) Topical <User Schedule>  dexMEDEtomidine Infusion 1.5 MICROgram(s)/kG/Hr (23.1 mL/Hr) IV Continuous <Continuous>  insulin lispro (ADMELOG) corrective regimen sliding scale   SubCutaneous every 6 hours  meropenem  IVPB 1000 milliGRAM(s) IV Intermittent every 8 hours  methimazole 20 milliGRAM(s) Oral every 8 hours  metoclopramide Injectable 10 milliGRAM(s) IV Push every 8 hours  multivitamin 1 Tablet(s) Oral daily  pantoprazole  Injectable 40 milliGRAM(s) IV Push every 12 hours  sodium chloride 0.9%. 1000 milliLiter(s) (10 mL/Hr) IV Continuous <Continuous>  thiamine 100 milliGRAM(s) Oral daily  vancomycin    Solution 125 milliGRAM(s) Oral every 12 hours  vasopressin Infusion 0.067 Unit(s)/Min (4 mL/Hr) IV Continuous <Continuous>    MEDICATIONS  (PRN):  acetaminophen    Suspension .. 650 milliGRAM(s) Enteral Tube every 6 hours PRN Mild Pain (1 - 3)  simethicone 80 milliGRAM(s) Chew every 8 hours PRN Gas      Allergies    Amiodarone Hydrochloride (Other (Severe))    Intolerances      Review of Systems:  Constitutional: No fever  Eyes: No blurry vision  Neuro: No tremors  HEENT: No pain  Cardiovascular: No chest pain, palpitations  Respiratory: No SOB, no cough  GI: No nausea, vomiting, abdominal pain  : No dysuria  Skin: no rash  Psych: no depression  Endocrine: no polyuria, polydipsia  Hem/lymph: no swelling  Osteoporosis: no fractures    ALL OTHER SYSTEMS REVIEWED AND NEGATIVE    UNABLE TO OBTAIN    PHYSICAL EXAM:  VITALS: T(C): 37.5 (10-01-21 @ 08:00)  T(F): 99.5 (10-01-21 @ 08:00), Max: 101.2 (09-30-21 @ 12:00)  HR: 78 (10-01-21 @ 09:45) (71 - 114)  BP: --  RR:  (10 - 47)  SpO2:  (97% - 100%)  Wt(kg): --  GENERAL: NAD, well-groomed, well-developed  EYES: No proptosis, no lid lag, anicteric  HEENT:  Atraumatic, Normocephalic, moist mucous membranes  THYROID: Normal size, no palpable nodules  RESPIRATORY: Clear to auscultation bilaterally; No rales, rhonchi, wheezing, or rubs  CARDIOVASCULAR: Regular rate and rhythm; No murmurs; no peripheral edema  GI: Soft, nontender, non distended, normal bowel sounds  SKIN: Dry, intact, No rashes or lesions  MUSCULOSKELETAL: Full range of motion, normal strength  NEURO: sensation intact, extraocular movements intact, no tremor, normal reflexes  PSYCH: Alert and oriented x 3, normal affect, normal mood  CUSHING'S SIGNS: no striae    POCT Blood Glucose.: 163 mg/dL (10-01-21 @ 06:03)  POCT Blood Glucose.: 147 mg/dL (09-30-21 @ 17:51)  POCT Blood Glucose.: 228 mg/dL (09-30-21 @ 05:40)  POCT Blood Glucose.: 162 mg/dL (09-29-21 @ 18:21)  POCT Blood Glucose.: 163 mg/dL (09-29-21 @ 11:42)  POCT Blood Glucose.: 142 mg/dL (09-29-21 @ 05:35)  POCT Blood Glucose.: 144 mg/dL (09-28-21 @ 23:13)  POCT Blood Glucose.: 125 mg/dL (09-28-21 @ 17:27)  POCT Blood Glucose.: 132 mg/dL (09-28-21 @ 16:04)  POCT Blood Glucose.: 129 mg/dL (09-28-21 @ 14:52)  POCT Blood Glucose.: 126 mg/dL (09-28-21 @ 11:56)      10-01    146<H>  |  117<H>  |  24<H>  ----------------------------<  183<H>  4.6   |  17<L>  |  0.63    EGFR if : 120  EGFR if non : 103    Ca    9.6      10-01  Mg     1.9     10-01  Phos  3.5     10-01    TPro  6.6  /  Alb  3.3  /  TBili  4.0<H>  /  DBili  x   /  AST  15  /  ALT  16  /  AlkPhos  150<H>  10-01          Thyroid Function Tests:  09-30 @ 05:53 TSH -- FreeT4 5.2 T3 103 Anti TPO -- Anti Thyroglobulin Ab -- TSI --  09-28 @ 07:19 TSH -- FreeT4 6.4 T3 -- Anti TPO -- Anti Thyroglobulin Ab -- TSI --                           Chief Complaint: Hyperthyroidism    History: Patient is on pressors, off propranolol. HR 70s-80s. FT4 is trending down.     MEDICATIONS  (STANDING):  albumin human  5% IVPB 250 milliLiter(s) IV Intermittent once  chlorhexidine 0.12% Liquid 15 milliLiter(s) Oral Mucosa two times a day  chlorhexidine 2% Cloths 1 Application(s) Topical <User Schedule>  dexMEDEtomidine Infusion 1.5 MICROgram(s)/kG/Hr (23.1 mL/Hr) IV Continuous <Continuous>  insulin lispro (ADMELOG) corrective regimen sliding scale   SubCutaneous every 6 hours  meropenem  IVPB 1000 milliGRAM(s) IV Intermittent every 8 hours  methimazole 20 milliGRAM(s) Oral every 8 hours  metoclopramide Injectable 10 milliGRAM(s) IV Push every 8 hours  multivitamin 1 Tablet(s) Oral daily  pantoprazole  Injectable 40 milliGRAM(s) IV Push every 12 hours  sodium chloride 0.9%. 1000 milliLiter(s) (10 mL/Hr) IV Continuous <Continuous>  thiamine 100 milliGRAM(s) Oral daily  vancomycin    Solution 125 milliGRAM(s) Oral every 12 hours  vasopressin Infusion 0.067 Unit(s)/Min (4 mL/Hr) IV Continuous <Continuous>    MEDICATIONS  (PRN):  acetaminophen    Suspension .. 650 milliGRAM(s) Enteral Tube every 6 hours PRN Mild Pain (1 - 3)  simethicone 80 milliGRAM(s) Chew every 8 hours PRN Gas      Allergies    Amiodarone Hydrochloride (Other (Severe))    Intolerances      Review of Systems:  Constitutional: No fever  Eyes: No blurry vision  Neuro: No tremors  HEENT: No pain  Cardiovascular: No chest pain, palpitations  Respiratory: No SOB, no cough  GI: No nausea, vomiting, abdominal pain  : No dysuria  Skin: no rash  Psych: no depression  Endocrine: no polyuria, polydipsia      ALL OTHER SYSTEMS REVIEWED AND NEGATIVE        PHYSICAL EXAM:  VITALS: T(C): 37.5 (10-01-21 @ 08:00)  T(F): 99.5 (10-01-21 @ 08:00), Max: 101.2 (09-30-21 @ 12:00)  HR: 78 (10-01-21 @ 09:45) (71 - 114)  BP: --  RR:  (10 - 47)  SpO2:  (97% - 100%)  Wt(kg): --  GENERAL: NAD, well-groomed, well-developed  EYES: No proptosis, no lid lag, anicteric  HEENT:  Atraumatic, Normocephalic, moist mucous membranes  RESPIRATORY: Clear to auscultation bilaterally; No rales, rhonchi, wheezing, or rubs  CARDIOVASCULAR: Regular rate and rhythm; No murmurs  GI: Soft, nontender, non distended, normal bowel sounds  SKIN: Dry, intact, No rashes or lesions  MUSCULOSKELETAL: Full range of motion, normal strength  NEURO: sensation intact, extraocular movements intact, no tremor, normal reflexes  PSYCH: Alert and oriented x 3, normal affect, normal mood    POCT Blood Glucose.: 163 mg/dL (10-01-21 @ 06:03)  POCT Blood Glucose.: 147 mg/dL (09-30-21 @ 17:51)  POCT Blood Glucose.: 228 mg/dL (09-30-21 @ 05:40)  POCT Blood Glucose.: 162 mg/dL (09-29-21 @ 18:21)  POCT Blood Glucose.: 163 mg/dL (09-29-21 @ 11:42)  POCT Blood Glucose.: 142 mg/dL (09-29-21 @ 05:35)  POCT Blood Glucose.: 144 mg/dL (09-28-21 @ 23:13)  POCT Blood Glucose.: 125 mg/dL (09-28-21 @ 17:27)  POCT Blood Glucose.: 132 mg/dL (09-28-21 @ 16:04)  POCT Blood Glucose.: 129 mg/dL (09-28-21 @ 14:52)  POCT Blood Glucose.: 126 mg/dL (09-28-21 @ 11:56)      10-01    146<H>  |  117<H>  |  24<H>  ----------------------------<  183<H>  4.6   |  17<L>  |  0.63    EGFR if : 120  EGFR if non : 103    Ca    9.6      10-01  Mg     1.9     10-01  Phos  3.5     10-01    TPro  6.6  /  Alb  3.3  /  TBili  4.0<H>  /  DBili  x   /  AST  15  /  ALT  16  /  AlkPhos  150<H>  10-01          Thyroid Function Tests:  09-30 @ 05:53 TSH -- FreeT4 5.2 T3 103 Anti TPO -- Anti Thyroglobulin Ab -- TSI --  09-28 @ 07:19 TSH -- FreeT4 6.4 T3 -- Anti TPO -- Anti Thyroglobulin Ab -- TSI --

## 2021-10-01 NOTE — PROGRESS NOTE ADULT - SUBJECTIVE AND OBJECTIVE BOX
INFECTIOUS DISEASES FOLLOW UP-- Anitra Prater  [phone]    This is a follow up note for this  66yMale with  Anemia    Acute cholecystitis        ROS:  CONSTITUTIONAL:  No fever, good appetite  CARDIOVASCULAR:  No chest pain or palpitations  RESPIRATORY:  No dyspnea  GASTROINTESTINAL:  No nausea, vomiting, diarrhea, or abdominal pain  GENITOURINARY:  No dysuria  NEUROLOGIC:  No headache,     Allergies    Amiodarone Hydrochloride (Other (Severe))    Intolerances        ANTIBIOTICS/RELEVANT:  antimicrobials  meropenem  IVPB 1000 milliGRAM(s) IV Intermittent every 8 hours  vancomycin    Solution 125 milliGRAM(s) Oral every 12 hours    immunologic:    OTHER:  acetaminophen    Suspension .. 650 milliGRAM(s) Enteral Tube every 6 hours PRN  chlorhexidine 0.12% Liquid 15 milliLiter(s) Oral Mucosa two times a day  chlorhexidine 2% Cloths 1 Application(s) Topical <User Schedule>  dexMEDEtomidine Infusion 1.5 MICROgram(s)/kG/Hr IV Continuous <Continuous>  insulin lispro (ADMELOG) corrective regimen sliding scale   SubCutaneous every 6 hours  methimazole 20 milliGRAM(s) Oral every 8 hours  metoclopramide Injectable 10 milliGRAM(s) IV Push every 8 hours  multivitamin 1 Tablet(s) Oral daily  pantoprazole  Injectable 40 milliGRAM(s) IV Push every 12 hours  simethicone 80 milliGRAM(s) Chew every 8 hours PRN  sodium chloride 0.9%. 1000 milliLiter(s) IV Continuous <Continuous>  thiamine 100 milliGRAM(s) Oral daily  vasopressin Infusion 0.067 Unit(s)/Min IV Continuous <Continuous>      Objective:  Vital Signs Last 24 Hrs  T(C): 38.1 (01 Oct 2021 18:00), Max: 38.1 (01 Oct 2021 18:00)  T(F): 100.6 (01 Oct 2021 18:00), Max: 100.6 (01 Oct 2021 18:00)  HR: 77 (01 Oct 2021 18:30) (71 - 87)  BP: --  BP(mean): --  RR: 20 (01 Oct 2021 18:30) (10 - 23)  SpO2: 100% (01 Oct 2021 18:30) (100% - 100%)    PHYSICAL EXAM:  Constitutional:no acute distress  Eyes:ALONSO, EOMI  Ear/Nose/Throat: no oral lesions, 	  Respiratory: clear BL  Cardiovascular: S1S2  Gastrointestinal:soft, (+) BS, no tenderness  Extremities:no e/e/c  No Lymphadenopathy  IV sites not inflammed.    LABS:                        9.1    25.00 )-----------( 53       ( 01 Oct 2021 01:23 )             28.2     10-01    146<H>  |  117<H>  |  24<H>  ----------------------------<  183<H>  4.6   |  17<L>  |  0.63    Ca    9.6      01 Oct 2021 01:25  Phos  3.5     10-01  Mg     1.9     10-01    TPro  6.6  /  Alb  3.3  /  TBili  4.0<H>  /  DBili  x   /  AST  15  /  ALT  16  /  AlkPhos  150<H>  10-01          MICROBIOLOGY:            RECENT CULTURES:  09-30 @ 16:56  .Blood Blood-Peripheral  Blood Culture PCR  Blood Culture PCR  PCR    Growth in aerobic bottle: Gram Negative Rods  Growth in anaerobic bottle: Gram Negative Rods  --  09-27 @ 14:26  .Sputum Sputum  Enterobacter cloacae complex  Enterobacter cloacae complex  CLAU    Moderate Enterobacter cloacae complex  Normal Respiratory Bria present  --  09-26 @ 03:25  .Blood Blood-Peripheral  --  --  --    No Growth Final  --      RADIOLOGY & ADDITIONAL STUDIES: INFECTIOUS DISEASES FOLLOW UP-- Anitra Prater  [phone]    This is a follow up note for this  66yMale with  Anemia  intubated, sleeping, cachectic    Acute cholecystitis        ROS:  CONSTITUTIONAL:  frail, sleeping    Allergies    Amiodarone Hydrochloride (Other (Severe))    Intolerances        ANTIBIOTICS/RELEVANT:  antimicrobials  meropenem  IVPB 1000 milliGRAM(s) IV Intermittent every 8 hours  vancomycin    Solution 125 milliGRAM(s) Oral every 12 hours    immunologic:    OTHER:  acetaminophen    Suspension .. 650 milliGRAM(s) Enteral Tube every 6 hours PRN  chlorhexidine 0.12% Liquid 15 milliLiter(s) Oral Mucosa two times a day  chlorhexidine 2% Cloths 1 Application(s) Topical <User Schedule>  dexMEDEtomidine Infusion 1.5 MICROgram(s)/kG/Hr IV Continuous <Continuous>  insulin lispro (ADMELOG) corrective regimen sliding scale   SubCutaneous every 6 hours  methimazole 20 milliGRAM(s) Oral every 8 hours  metoclopramide Injectable 10 milliGRAM(s) IV Push every 8 hours  multivitamin 1 Tablet(s) Oral daily  pantoprazole  Injectable 40 milliGRAM(s) IV Push every 12 hours  simethicone 80 milliGRAM(s) Chew every 8 hours PRN  sodium chloride 0.9%. 1000 milliLiter(s) IV Continuous <Continuous>  thiamine 100 milliGRAM(s) Oral daily  vasopressin Infusion 0.067 Unit(s)/Min IV Continuous <Continuous>      Objective:  Vital Signs Last 24 Hrs  T(C): 38.1 (01 Oct 2021 18:00), Max: 38.1 (01 Oct 2021 18:00)  T(F): 100.6 (01 Oct 2021 18:00), Max: 100.6 (01 Oct 2021 18:00)  HR: 77 (01 Oct 2021 18:30) (71 - 87)  BP: --  BP(mean): --  RR: 20 (01 Oct 2021 18:30) (10 - 23)  SpO2: 100% (01 Oct 2021 18:30) (100% - 100%)    PHYSICAL EXAM:  Constitutional: sleeping  Eyes:ALONSO, EOMI  Ear/Nose/Throat: no oral lesions, 	  Respiratory: clear BL  Cardiovascular: S1S2  Gastrointestinal:soft, (+) BS, no tenderness  Extremities:no e/e/c  No Lymphadenopathy  IV sites not inflammed.    LABS:                        9.1    25.00 )-----------( 53       ( 01 Oct 2021 01:23 )             28.2     10-01    146<H>  |  117<H>  |  24<H>  ----------------------------<  183<H>  4.6   |  17<L>  |  0.63    Ca    9.6      01 Oct 2021 01:25  Phos  3.5     10-01  Mg     1.9     10-01    TPro  6.6  /  Alb  3.3  /  TBili  4.0<H>  /  DBili  x   /  AST  15  /  ALT  16  /  AlkPhos  150<H>  10-01          MICROBIOLOGY:            RECENT CULTURES:  09-30 @ 16:56  .Blood Blood-Peripheral  Blood Culture PCR  Blood Culture PCR  PCR    Growth in aerobic bottle: Gram Negative Rods  Growth in anaerobic bottle: Gram Negative Rods  --  09-27 @ 14:26  .Sputum Sputum  Enterobacter cloacae complex  Enterobacter cloacae complex  CLAU    Moderate Enterobacter cloacae complex  Normal Respiratory Bria present  --  09-26 @ 03:25  .Blood Blood-Peripheral  --  --  --    No Growth Final  --      RADIOLOGY & ADDITIONAL STUDIES:

## 2021-10-01 NOTE — CONSULT NOTE ADULT - PROBLEM SELECTOR PROBLEM 1
ACC/AHA stage D systolic heart failure
Functional quadriplegia
Hyperthyroidism
Respiratory failure
Anemia, unspecified type
ACC/AHA stage D systolic heart failure

## 2021-10-01 NOTE — CONSULT NOTE ADULT - CONVERSATION DETAILS
met with family at bedside along with patient, SW and CTU PA  Overview of roles explained and medical update provided.    We explained reason for meeting was in reference to patients clinical status last week- recurrent GIB, requiring pressors and septic shock. We discussed how currently, he is no longer bleeding, no longer on pressors, and infection is being treated with antibiotics. Patient awake, alert and able to participate in conversation to an extent.     We discussed current plan of care is regarding cpap trials and trying to answer risk/benefit of anticoagulation given patients previously supra-therapeutic INR and risk of bleeding/clotting with or without AC.     Gene asked this provider regarding hospice services and explained that if patients goals were aligned with symptom focused care that he could be a candidate for hospice, however, the presence of the LVAD would likely interfere with what level of care he could receive and whether or not it would be in line with goals for deactivation. Explained that in the alert/awake person who has shown improvement since last week, the CTU team will continue medical management including CPAP trials. Next steps will be regarding ongoing nutrition and anticoagulation and continuing therapy for antibiotics.    We also addressed HCP with patient and who he would trust to make medical decisions in the event he got more sick or could not participate. Patient elected sister Alyssa as proxy, and HCP form was filled out with verbal consent.     All questions answered. we explained that in the event patient got more sick and was not improving, we would plan to meet with patient and/or family to discuss options further. for now, all in agreement with continued medical management.
Heart failure/LVAD team Dr. Stu Son, LVAD coordinator Tamanna Mcclendon NP and LVAD RN coordinator and myself involved in conversation.    Patient able to confirm his name for us. He wants the ET tube out. Team explained to him that if that needs to come out, he is going to need a tracheostomy, similar to the one he had in the past. Patient initially hesitant about tracheostomy but later confirmed that he would like to have it. Team was able to assess his capacity by confirming his answers with yes or no questions and he nods or shakes his head in response. Was also able to clarify capacity by following instructions like "close eyes or open eyes" to convey certain information and he was able to do so appropriately. Factual questions based on his name were used to assess. He was able to convey to multiple team members, at different times during the same encounter, that he wanted the tracheostomy.     Plan per ENT and primary team is to get the tracheostomy done Monday, 10/4/21.

## 2021-10-01 NOTE — PROGRESS NOTE ADULT - ATTENDING COMMENTS
Was febrile, hypotensive but now improving. Cultures with GNR bacteremia. More alert but still somnolent, remains chronically ill, nl JVP, LVAD hum, nontender abdomen, no pedal edema. Labs reviewed.   - abx as above  - wean pressors as tolerated  - c/w pall care and psychiatry for GOC/capacity  - patient previously declined PEG/PEJ or other surgeries but amenable to trach   - RUQ US given rising Tbili and GNR bacteremia  - prognosis guarded

## 2021-10-01 NOTE — PROGRESS NOTE ADULT - SUBJECTIVE AND OBJECTIVE BOX
Subjective: Patient seen and examined resting in bed.    Medications:  acetaminophen    Suspension .. 650 milliGRAM(s) Enteral Tube every 6 hours PRN  cefepime   IVPB 1000 milliGRAM(s) IV Intermittent every 8 hours  chlorhexidine 0.12% Liquid 15 milliLiter(s) Oral Mucosa two times a day  chlorhexidine 2% Cloths 1 Application(s) Topical <User Schedule>  dexMEDEtomidine Infusion 1.5 MICROgram(s)/kG/Hr IV Continuous <Continuous>  insulin lispro (ADMELOG) corrective regimen sliding scale   SubCutaneous every 6 hours  methimazole 20 milliGRAM(s) Oral every 8 hours  metoclopramide Injectable 10 milliGRAM(s) IV Push every 8 hours  multivitamin 1 Tablet(s) Oral daily  pantoprazole  Injectable 40 milliGRAM(s) IV Push every 12 hours  simethicone 80 milliGRAM(s) Chew every 8 hours PRN  sodium chloride 0.9%. 1000 milliLiter(s) IV Continuous <Continuous>  thiamine 100 milliGRAM(s) Oral daily  vancomycin  IVPB 1000 milliGRAM(s) IV Intermittent every 12 hours  vasopressin Infusion 0.067 Unit(s)/Min IV Continuous <Continuous>    ICU Vital Signs Last 24 Hrs  T(C): 37.8 (01 Oct 2021 00:00), Max: 40.8 (30 Sep 2021 10:30)  T(F): 100 (01 Oct 2021 00:00), Max: 105.4 (30 Sep 2021 10:30)  HR: 77 (01 Oct 2021 06:15) (72 - 114)  BP: --  BP(mean): --  ABP: 81/66 (01 Oct 2021 06:15) (28/28 - 102/75)  ABP(mean): 72 (01 Oct 2021 06:15) (28 - 89)  RR: 20 (01 Oct 2021 06:15) (10 - 49)  SpO2: 100% (01 Oct 2021 06:15) (92% - 100%)    Mode: AC/ CMV (Assist Control/ Continuous Mandatory Ventilation)  RR (machine): 20  TV (machine): 500  FiO2: 40  PEEP: 5  ITime: 1  MAP: 11  PIP: 20      Weight in k (10-01 @ 00:00)    I&O's Summary    30 Sep 2021 07:01  -  01 Oct 2021 07:00  --------------------------------------------------------  IN: 2395.2 mL / OUT: 1785 mL / NET: 610.2 mL        Physical Exam  General: intubated and sedated  Chest: intubated, +vent sound  CV: +VAD hum  Abdomen: Soft, non-distended, non-tender  Neurology: sedated     LVAD Interrogation  VAD TYPE HM 2  Speed 9200  Flow 5.7  Power 5.8  PI 5.4  Assessment of driveline exit site: driveline dressing c/d/i  Programming changes: no changes made    Labs:                        9.1    . )-----------( 53       ( 01 Oct 2021 01:23 )             28.2     10    146<H>  |  117<H>  |  24<H>  ----------------------------<  183<H>  4.6   |  17<L>  |  0.63    Ca    9.6      01 Oct 2021 01:25  Phos  3.5     10-  Mg     1.9     10-01    TPro  6.6  /  Alb  3.3  /  TBili  4.0<H>  /  DBili  x   /  AST  15  /  ALT  16  /  AlkPhos  150<H>  10              Lactate Dehydrogenase, Serum: 196 U/L (10-01 @ 01:25)  Lactate Dehydrogenase, Serum: 209 U/L ( @ 00:37)  Lactate Dehydrogenase, Serum: 216 U/L ( @ 00:49)       Subjective: Patient seen and examined resting in bed.    Medications:  acetaminophen    Suspension .. 650 milliGRAM(s) Enteral Tube every 6 hours PRN  cefepime   IVPB 1000 milliGRAM(s) IV Intermittent every 8 hours  chlorhexidine 0.12% Liquid 15 milliLiter(s) Oral Mucosa two times a day  chlorhexidine 2% Cloths 1 Application(s) Topical <User Schedule>  dexMEDEtomidine Infusion 1.5 MICROgram(s)/kG/Hr IV Continuous <Continuous>  insulin lispro (ADMELOG) corrective regimen sliding scale   SubCutaneous every 6 hours  methimazole 20 milliGRAM(s) Oral every 8 hours  metoclopramide Injectable 10 milliGRAM(s) IV Push every 8 hours  multivitamin 1 Tablet(s) Oral daily  pantoprazole  Injectable 40 milliGRAM(s) IV Push every 12 hours  simethicone 80 milliGRAM(s) Chew every 8 hours PRN  sodium chloride 0.9%. 1000 milliLiter(s) IV Continuous <Continuous>  thiamine 100 milliGRAM(s) Oral daily  vancomycin  IVPB 1000 milliGRAM(s) IV Intermittent every 12 hours  vasopressin Infusion 0.067 Unit(s)/Min IV Continuous <Continuous>    ICU Vital Signs Last 24 Hrs  T(C): 37.8 (01 Oct 2021 00:00), Max: 40.8 (30 Sep 2021 10:30)  T(F): 100 (01 Oct 2021 00:00), Max: 105.4 (30 Sep 2021 10:30)  HR: 77 (01 Oct 2021 06:15) (72 - 114)  BP: --  BP(mean): --  ABP: 81/66 (01 Oct 2021 06:15) (28/28 - 102/75)  ABP(mean): 72 (01 Oct 2021 06:15) (28 - 89)  RR: 20 (01 Oct 2021 06:15) (10 - 49)  SpO2: 100% (01 Oct 2021 06:15) (92% - 100%)    Mode: AC/ CMV (Assist Control/ Continuous Mandatory Ventilation)  RR (machine): 20  TV (machine): 500  FiO2: 40  PEEP: 5  ITime: 1  MAP: 11  PIP: 20      Weight in k (10-01 @ 00:00)    I&O's Summary    30 Sep 2021 07:01  -  01 Oct 2021 07:00  --------------------------------------------------------  IN: 2395.2 mL / OUT: 1785 mL / NET: 610.2 mL        Physical Exam  General: intubated and sedated  Chest: intubated, +vent sound  CV: +VAD hum  Abdomen: Soft, non-distended, non-tender  Neurology: sedated     LVAD Interrogation  VAD TYPE HM 2  Speed 9200  Flow 5.7  Power 5.8  PI 5.4  Assessment of driveline exit site: driveline dressing c/d/i  Programming changes: backup battery was exchanged  Old back up battery serial number [id number], replace in 6 months  New back up battery serial number [id number], replace in 24 months       Labs:                        9.1    25.00 )-----------( 53       ( 01 Oct 2021 01:23 )             28.2     10-01    146<H>  |  117<H>  |  24<H>  ----------------------------<  183<H>  4.6   |  17<L>  |  0.63    Ca    9.6      01 Oct 2021 01:25  Phos  3.5     10-01  Mg     1.9     10-01    TPro  6.6  /  Alb  3.3  /  TBili  4.0<H>  /  DBili  x   /  AST  15  /  ALT  16  /  AlkPhos  150<H>  10-01              Lactate Dehydrogenase, Serum: 196 U/L (10-01 @ 01:25)  Lactate Dehydrogenase, Serum: 209 U/L ( @ 00:37)  Lactate Dehydrogenase, Serum: 216 U/L ( @ 00:49)

## 2021-10-01 NOTE — CONSULT NOTE ADULT - ATTENDING COMMENTS
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 coli UTIs, driveline drainage (1/7/21) and COVID-19 (back in April 2020) with prolonged hospitalization c/b acute hypoxic respiratory failure in the setting of refractory nausea/vomiting, currently mechanically ventilated, being treated for GNR bacteremia.    GOC: As noticed above, today a GOC discussion was led by HF/LVAD team. Based on the GOC discussion above, a decision for trach was made and it is tentatively schedule for next week.   Nausea, vomiting, and abdominal pain: Understanding an intervention for his SMA stenosis is unlikely, if there are not contraindications, may want to consider a trial of Reglan 5mg IV before meals and hs and also 5mg IV q 6 PRN or Zofran 8mg IV q 8 PRN for recurrent symptoms. Dietary is f/u.   Pain: Reglan as above. If intractable pain, may then consider Morphine 1mg IV q 2 PRN.       Will sign off.   Call us back if needing any further help with GOC, ACP, symptoms, or resources    Francisco Burgos MD.   [phone] working hours  After hours or weekends, [phone].

## 2021-10-01 NOTE — PROGRESS NOTE ADULT - ASSESSMENT
Assessment and Recommendation:   · Assessment	  Assessment and recommendation :  Recurrent Acute hypoxic respiratory Failure reintubated on full ventilator support FI02 is 40%  Acute blood loss anemia S/P multiple  blood transfusion   recurrent  septic shock on vasopressin   hemorrhagic shock receiving 3 unit of blood transfusion   pan culture negative S/P  IV vancomycin , po vancomycin and Zosyn and Diflucan   S/P cholecystostomy tube placement by IR    AF RVR back to regular sinus Rhythm   Non ischemic cardiomyopathy continue ACE inhibitor and B-Blockers   mesenteric ischemia failure to stent SMA , no plan for repeated trial   S/P 3 unit of blood transfusion   S/P Septic shock and cardiogenic shock   Stage D systolic heart failure S/P LVAD HM2   MH2 LVAD  with  TV Annuloplasty  Severe peripheral vascular disease   severe hyperglycemia on insulin coverage    Reglan 10 mg for Gastric Motility   hyperthyroidism on Methimazole 10mg TID   critical care polyneuropathy   Anemia of Acute blood Loss   severe protein caloric malnutrition   Chronic kidney disease stage III  Depressed and withdrawn   resume NG tube feeding   GI prophylaxis with PPI   Discussed with TCV team   critical care time is 35 minutes

## 2021-10-01 NOTE — CONSULT NOTE ADULT - SUBJECTIVE AND OBJECTIVE BOX
SUBJECTIVE AND OBJECTIVE:    Somnolent but able to have a conversation with us regarding further plan of care. Refer C note below.    INTERVAL HPI/OVERNIGHT EVENTS:  Patient developed fevers, hypotension yesterday, BC from 9/30 growing GNR. Patient required aggressive pressor support, improving in the last 24 hours.     DNR on chart:   Allergies    Amiodarone Hydrochloride (Other (Severe))    Intolerances    MEDICATIONS  (STANDING):  chlorhexidine 0.12% Liquid 15 milliLiter(s) Oral Mucosa two times a day  chlorhexidine 2% Cloths 1 Application(s) Topical <User Schedule>  dexMEDEtomidine Infusion 1.5 MICROgram(s)/kG/Hr (23.1 mL/Hr) IV Continuous <Continuous>  insulin lispro (ADMELOG) corrective regimen sliding scale   SubCutaneous every 6 hours  magnesium sulfate  IVPB 1 Gram(s) IV Intermittent once  meropenem  IVPB 1000 milliGRAM(s) IV Intermittent every 8 hours  methimazole 20 milliGRAM(s) Oral every 8 hours  metoclopramide Injectable 10 milliGRAM(s) IV Push every 8 hours  multivitamin 1 Tablet(s) Oral daily  pantoprazole  Injectable 40 milliGRAM(s) IV Push every 12 hours  potassium chloride  10 mEq/50 mL IVPB 10 milliEquivalent(s) IV Intermittent every 1 hour  sodium chloride 0.9%. 1000 milliLiter(s) (10 mL/Hr) IV Continuous <Continuous>  thiamine 100 milliGRAM(s) Oral daily  vancomycin    Solution 125 milliGRAM(s) Oral every 12 hours  vasopressin Infusion 0.067 Unit(s)/Min (4 mL/Hr) IV Continuous <Continuous>    MEDICATIONS  (PRN):  acetaminophen    Suspension .. 650 milliGRAM(s) Enteral Tube every 6 hours PRN Mild Pain (1 - 3)  simethicone 80 milliGRAM(s) Chew every 8 hours PRN Gas      ITEMS UNCHECKED ARE NOT PRESENT    PRESENT SYMPTOMS: [ ]Unable to obtain due to poor mentation   Source if other than patient:  [ ]Family   [ ]Team     Pain:  [x ]yes [ ]no, unable to assess but nods yes when asked if he has pain  QOL impact -   Location -                    Aggravating factors -  Quality -  Radiation -  Timing-  Severity (0-10 scale):  Minimal acceptable level (0-10 scale):     Dyspnea:                           [ ]Mild [ ]Moderate [ ]Severe  Anxiety:                             [ ]Mild [ ]Moderate [ ]Severe  Fatigue:                             [ ]Mild [ ]Moderate [ ]Severe  Nausea:                             [ ]Mild [ ]Moderate [ ]Severe  Loss of appetite:              [ ]Mild [ ]Moderate [ ]Severe  Constipation:                    [ ]Mild [ ]Moderate [ ]Severe    CPOT:    https://[email or web address]/[email or web address]/Critical-Care-Pain-Observation-Tool-(CPOT)    PAIN AD Score:	0  [email or web address]/[email or web address] (Ctrl + left click to view)    Other Symptoms:  [ ]All other review of systems negative     Palliative Performance Status Version 2: 10     %      [email or web address]/[email or web address]_[email or web address]  PHYSICAL EXAM:  Vital Signs Last 24 Hrs  T(C): 37.7 (01 Oct 2021 11:00), Max: 38 (30 Sep 2021 16:00)  T(F): 99.9 (01 Oct 2021 11:00), Max: 100.4 (30 Sep 2021 16:00)  HR: 85 (01 Oct 2021 13:45) (71 - 93)  BP: --  BP(mean): --  RR: 20 (01 Oct 2021 13:45) (10 - 23)  SpO2: 100% (01 Oct 2021 13:45) (100% - 100%) I&O's Summary    30 Sep 2021 07:01  -  01 Oct 2021 07:00  --------------------------------------------------------  IN: 2395.2 mL / OUT: 1785 mL / NET: 610.2 mL    01 Oct 2021 07:01  -  01 Oct 2021 13:53  --------------------------------------------------------  IN: 631.7 mL / OUT: 470 mL / NET: 161.7 mL       GENERAL:  [ ]Alert  [ ]Oriented x   [ x]Lethargic  [ ]Cachexia  [ ]Unarousable  [ ]Verbal  [ ]Non-Verbal, intubated, on minimal sedation  Behavioral:   [ ]Anxiety  [ ]Delirium [ ]Agitation [ ]Other  HEENT:  [ ]Normal   [ ]Dry mouth   [ ]ET Tube/Trach  [ ]Oral lesions  PULMONARY:   [ ]Clear [ ]Tachypnea  [ ]Audible excessive secretions   [ ]Rhonchi        [ ]Right [ ]Left [ ]Bilateral  [ ]Crackles        [ ]Right [ ]Left [ ]Bilateral  [ ]Wheezing     [ ]Right [ ]Left [ ]Bilateral  [x ]Diminished BS [ ] Right [ ]Left [x ]Bilateral  CARDIOVASCULAR:    [x ]Regular [ ]Irregular [ ]Tachy  [ ]Jose [ ]Murmur [ ]Other  GASTROINTESTINAL:  [x ]Soft  [ ]Distended   [ ]+BS  [ ]Non tender [ ]Tender  [ ]PEG [ ]OGT/ NGT   Last BM:    GENITOURINARY:  [ ]Normal [ ]Incontinent   [ ]Oliguria/Anuria   [ ]Landrum  MUSCULOSKELETAL:   [ ]Normal   [x ]Weakness  [ ]Bed/Wheelchair bound [ ]Edema  NEUROLOGIC:   [ ]No focal deficits  [ ] Cognitive impairment  [ ] Dysphagia [ ]Dysarthria [ ] Paresis [ ]Other   SKIN:   [ ]Normal  [ ]Rash   [ ]Pressure ulcer(s) [ ]y [ ]n present on admission    CRITICAL CARE:  [x ]Shock Present  [x ]Septic [x ]Cardiogenic [ ]Neurologic [ ]Hypovolemic  [x ]Vasopressors [ ]Inotropes  [x ]Respiratory failure present [x ]Mechanical Ventilation [ ]Non-invasive ventilatory support [ ]High-Flow Mode: AC/ CMV (Assist Control/ Continuous Mandatory Ventilation), RR (machine): 20, TV (machine): 500, FiO2: 40, PEEP: 5, ITime: 1, MAP: 12, PIP: 27  [x ]Acute  [ ]Chronic [x ]Hypoxic  [ ]Hypercarbic [ ]Other  [ ]Other organ failure     LABS:                        9.1    25.00 )-----------( 53       ( 01 Oct 2021 01:23 )             28.2   10-01    146<H>  |  117<H>  |  24<H>  ----------------------------<  183<H>  4.6   |  17<L>  |  0.63    Ca    9.6      01 Oct 2021 01:25  Phos  3.5     10-01  Mg     1.9     10-01    TPro  6.6  /  Alb  3.3  /  TBili  4.0<H>  /  DBili  x   /  AST  15  /  ALT  16  /  AlkPhos  150<H>  10-01        RADIOLOGY & ADDITIONAL STUDIES:    Protein Calorie Malnutrition Present: [ ]mild [ ]moderate [ ]severe [ ]underweight [ ]morbid obesity  https://www.andeal.org/vault/2440/web/files/ONC/Table_Clinical%20Characteristics%20to%20Document%20Malnutrition-White%20JV%20et%20al%202012.pdf    Height (cm): 177.8 (09-10-21 @ 13:30), 177.8 (01-11-21 @ 09:28)  Weight (kg): 61.6 (09-10-21 @ 13:30), 74 (01-12-21 @ 10:48)  BMI (kg/m2): 19.5 (09-10-21 @ 13:30), 23.4 (01-12-21 @ 10:48)    [ ]PPSV2 < or = 30%  [ ]significant weight loss [ ]poor nutritional intake [ ]anasarca    [ ]Artificial Nutrition    REFERRALS:   [ ]Chaplaincy  [ ]Hospice  [ ]Child Life  [ ]Social Work  [ ]Case management [ ]Holistic Therapy     Goals of Care Document:

## 2021-10-01 NOTE — PROGRESS NOTE ADULT - SUBJECTIVE AND OBJECTIVE BOX
RICKY JOINT  MRN#: [id number]  Subjective:  Pulmonary progress  : recurrent Acute hypoxic respiratory Failure ,aspiration pneumonia, NICM  ,   65M PMH ACC/AHA stage D HF due to NICM HM2 LVAD , TV annuloplasty ring 17 as destination therapy due to severe peripheral artery disease with significant stenosis  SIADH, Depression, CKD-3 with hyperkalemia, past E. coli UTIs, driveline drainage (21) and COVID-19 (back in 2020)  He was recently seen in clinic where he complained of abdominal pain and dark stools w constipation back in May. He presents to [facility] ER today weakness and fatigue, moderate and + Black stools for three days, on coumadin secondary to warfarin use in the setting of an LVAD. Patient has required transfusions for GIB in the past. Mostly recently back in 2021 pt had anemia with dark stools. No interventions was done at that time. However Last Endoscopy was done in 2020 (negative). Today labs show patient is anemic with H/H of 4.5/16.3,. INR is 8.84 MAP in the 90s, Temp 35.1. He denies any chest pain, shortness of breath, dizziness, abd pain, nausea or vomiting. found to have  rectal bleeding underwent endoscopy ,old blood in the proximal ileum ,  develop sepsis with LL opacity given Antibiotics , Extubated , reintubated , Bronchoscopy on Zosyn for LL pneumonia  and Amiodarone S/P TV Annuloplasty , patient remain intubated on full ventilatory support .S/P multiple units of blood transfusion , remain on full ventilatory support on Precedex and propofol , new central IJ line , diarrhea C diff. +ve on po vancomycin and IV Flagyl,  mildly distended belly , fever start on cefepime 2gm q 8 hrs S/P tracheostomy .  new RT Subclavian central line continue on contact  isolation ,S/P  C-diff antibiotics, no more diarrhea back on full support mechanical ventilator , chest x ray show improvement in LLL air space disease, more awake and responsive on tube feeding no more diarrhea ,  no nausea or vomiting or diarrhea still very weak and tired , back on tube feeding ,still on po vancomycin , getting PT and OT at bed side ,   , no SOB getting stronger , improve muscle tone patient transfer to monitor bed still on contact isolation for C-Difficel colitis on 50% FI02, and change to Suresh  tube feeding still loose stool . H/H drop significantly require blood transfusion , most likely GI bleeding , IV heparin D/C ,  H/H is stable ., patient develop TR sided  pneumothorax require chest tube placement , RT IJ central line  placed , develop fever shaking chills , blood culture positive for serratia marcescens , start on cefepime .the patient  become hypoxic and hypotensive placed on full ventilatory support and Vasopressin , levophed and Dobutamine ,S/P blood transfusion on meropenem and vancomycin ,   , on and  off pressors , occasional agitation on Precedex .S/P IR cholecystostomy tube drainage placement in the RT upper Quadrant , resume anticoagulation chest x ray noted C-PAP trail lasted only for 2 hrs , new RT SC line and D/C RT IJ line , RT pig tail cathter has been removed , tolerating C-PAP trial placed on trach. collar 50% FI02 GI consultant noted on NG tube feeding as tolerated , develop AF RVR S/P  bolus Amiodarone  back to regular sinus Rhythm , Flat Affect depressed , back on tube feeding Vital AF at 60 cc/ hr .still intermittent abdominal pain , no fever saturation is accepted  back on full ventilatory support ,   on methimazole for hyperthyroidism ,  condition is the same ,still C/O Abdominal pain , white count is improving , no chest pain or SOB ,  .placed on Reglan 10 mg TID for gastric motility , depressed , withdrawn. , S/P  mesenteric angiogram , unable to stent SMA S/P 3 units of blood transfusion   RT IJ in place reassessed for stent in the SMA by vascular,  dark stool stable H/H ,surgical opinion for possible Lap cholecystectomy. over night events noted develop respiratory distress, , rectal bleeding, require intubation placed on full ventilatory support , FI02 is 50% also became hemianosmically unstable require triple pressor support with levophed , vasopressin and norsynephrine, pan culture placed  on Vancomycin IV and PO  and Zosyn, sedated with propofol kept NPO , new central line RT and left IJ cathter placed . Diflucan Added .fever of 38.5 weaned on   vasopressin , sedated on propofol , all culture are negative, resume tube feeding , BP fluctuate lethargic , white count is improving , on Zosyn antibiotic , on Precedex      (2021 16:57)    PAST MEDICAL & SURGICAL HISTORY:  CHF (congestive heart failure)    CAD (coronary artery disease)  Depression    Pleural effusion    History of 2019 novel coronavirus disease (COVID-19)  2020    Hemorrhoids    Bleeding hemorrhoids    Peripheral arterial disease    Claudication    BPH with urinary obstruction    ACC/AHA stage D systolic heart failure  Anticoagulation goal of INR 2.0 to 2.5    Falls    Clavicle fracture    CKD (chronic kidney disease), stage III    Iron deficiency anemia    H/O epistaxis    Vertigo    GI bleed    S/P TVR (tricuspid valve repair)    S/P ventricular assist device    S/P endoscopy    OBJECTIVE:  ICU Vital Signs Last 24 Hrs  T(C): 38.2 (2021 10:00), Max: 38.5 (2021 12:00)  T(F): 100.8 (2021 10:00), Max: 101.3 (2021 12:00)  HR: 65 (2021 10:00) (61 - 69)  BP: --  BP(mean): --  ABP: 105/67 (2021 10:00) (90/54 - 113/64)  ABP(mean): 77 (2021 10:00) (63 - 77)  RR: 20 (2021 10:00) (19 - 35)  SpO2: 99% (2021 10:00) (96% - 100%)       @ 07: @ 07:00  --------------------------------------------------------  IN: 2693.9 mL / OUT: 1415 mL / NET: 1278.9 mL     @ 07: @ 10:49  --------------------------------------------------------  IN: 420.8 mL / OUT: 115 mL / NET: 305.8 mL  PHYSICAL EXAM: Daily   Elderly male intubated on full ventilatory support FI02 is 40%  support on  vasopressin , Precedex   Daily Weight in k.4 (2021 00:00)  HEENT:     + NCAT  + EOMI  - Conjuctival edema   - Icterus   - Thrush   - ETT  + NGT/OGT  Neck:         + FROM  RT IJ , LT IJ  lines JVD  - Nodes - Masses + Mid-line trachea - Tracheostomy  Chest:            normal A-P diameter    Lungs:          + CTA   + Rhonchi    - Rales    - Wheezing + Decreased  LT BS   - Dullness R L  Cardiac:       + S1 + S2    + RRR   - Irregular   - S3  - S4    - Murmurs   - Rub   - Hamman’s sign   Abdomen:    + BS  + Soft + Non-tender - Distended - Organomegaly - PEG .cholecystostomy tube in place  Extremities:   - Cyanosis U/L   - Clubbing  U/L  + LE/UE Edema   + Capillary refill    + Pulses   Neuro:        - Awake   -  Alert   - Confused   - Lethargic   + Sedated  + Generalized Weakness  Skin:        - Rashes    - Erythema   + Normal incisions   + IV sites intact          HOSPITAL MEDICATIONS: All medications reviewed and analyzed  MEDICATIONS  (STANDING):  amiodarone    Tablet 200 milliGRAM(s) Oral daily  chlorhexidine 0.12% Liquid 15 milliLiter(s) Oral Mucosa every 12 hours  chlorhexidine 2% Cloths 1 Application(s) Topical <User Schedule>  dexmedetomidine Infusion 0.5 MICROgram(s)/kG/Hr (9.81 mL/Hr) IV Continuous <Continuous>  dextrose 50% Injectable 50 milliLiter(s) IV Push every 15 minutes  heparin  Infusion 400 Unit(s)/Hr (12.5 mL/Hr) IV Continuous <Continuous>  Hydromorphone  Injectable 0.5 milliGRAM(s) IV Push once  insulin lispro (ADMELOG) corrective regimen sliding scale   SubCutaneous every 6 hours  pantoprazole  Injectable 40 milliGRAM(s) IV Push every 12 hours  piperacillin/tazobactam IVPB.. 3.375 Gram(s) IV Intermittent every 8 hours  propofol Infusion 20 MICROgram(s)/kG/Min (9.42 mL/Hr) IV Continuous <Continuous>  sodium chloride 0.9% lock flush 3 milliLiter(s) IV Push every 8 hours  sodium chloride 0.9%. 1000 milliLiter(s) (10 mL/Hr) IV Continuous <Continuous>    MEDICATIONS  (PRN):  acetaminophen    Suspension .. 650 milliGRAM(s) Oral every 6 hours PRN Temp greater or equal to 38C (100.4F)    LABS: All Lab data reviewed and analyzed                         9.1    . )-----------( 53       ( 01 Oct 2021 01:23 )             28.2              10    146<H>  |  117<H>  |  24<H>  ----------------------------<  183<H>  4.6   |  17<L>  |  0.63    Ca    9.6      01 Oct 2021 01:25  Phos  3.5     10  Mg     1.9     10-01    TPro  6.6  /  Alb  3.3  /  TBili  4.0<H>  /  DBili  x   /  AST  15  /  ALT  16  /  AlkPhos  150<H>  10-01    Ca    10.3      30 Sep 2021 00:37  Phos  2.5       Mg     1.5         TPro  7.2  /  Alb  3.5  /  TBili  3.6<H>  /  DBili  2.4<H>  /  AST  17  /  ALT  22  /  AlkPhos  213<H>        Ca    10.8<H>      29 Sep 2021 00:49  Phos  3.8     -  Mg     1.7         TPro  7.6  /  Alb  3.8  /  TBili  1.8<H>  /  DBili  x   /  AST  14  /  ALT  21  /  AlkPhos  167<H>      Ca    10.7<H>      28 Sep 2021 00:24  Phos  1.8       Mg     2.0         TPro  7.5  /  Alb  4.1  /  TBili  2.1<H>  /  DBili  x   /  AST  19  /  ALT  23  /  AlkPhos  136<H>                                                                                                                                                                PTT - ( 2021 04:52 )  PTT:45.2 sec LIVER FUNCTIONS - ( 2021 00:42 )  Alb: 3.4 g/dL / Pro: 6.7 g/dL / ALK PHOS: 213 U/L / ALT: 15 U/L / AST: 24 U/L / GGT: x           RADIOLOGY: - Reviewed and analyzed RT Pig tail cathter  , LVAD HM2, CT scan of abdomen reviewed result noted

## 2021-10-01 NOTE — CONSULT NOTE ADULT - PROBLEM SELECTOR RECOMMENDATION 9
- Problem: Anemia, unspecified type.  Plan: - readmitted with H/H 4.5/16 in the setting of INR > 8  - 2 PRBC ordered  - Vitamin K given  - FFP 1-2 units ordered.   - Fecal Occult to be ordered  - hold Anticoagulation
Plan for trach on friday  NPO past midnight thursday into friday  Consent pending
Hyperthyroidism may be amiodarone-induced.  Will check thyroid antibodies, thyroid US to r/o thyroid nodule/goiter.  Will continue monitoring and FU.
currently on vasopressors, LVAD  heart failure team following
PPS 20%, requires total care  artificial nutrition via NGT
s/p LVAD, on vasopressors

## 2021-10-01 NOTE — CONSULT NOTE ADULT - PROBLEM SELECTOR RECOMMENDATION 5
meeting held today with patients family including sister, niece and nephew  HCP completed naming sister Alyssa as primary.  per CTU team, patient improved compared to last week, no longer with GIB and septic shock resolved.  ongoing medical management per CTU team.  >16 minutes spent on ACP
refer C note 9/29 and above
[facility] meeting Wednesday  Code status - full code  HCP - Ms. Shah, sister  Disposition: TBD  Prognosis: TBD

## 2021-10-01 NOTE — CONSULT NOTE ADULT - PROBLEM SELECTOR RECOMMENDATION 3
GIB and not on AC  CTU team to discuss risk/benefit
- Continue with current speed (8800 rpm)  - continue to monitor LDH daily
Suggest to continue medications, monitoring, FU primary team recommendations.
requires assistance with all ADLs, PPSV2 10%
fevers, since 9/30, BC sent on 9/30 growing GNR  f/u by ID, on meropenem  continues to require pressors, being weaned

## 2021-10-01 NOTE — CONSULT NOTE ADULT - PROBLEM SELECTOR PROBLEM 2
ACC/AHA stage D systolic heart failure
CHF (congestive heart failure)
LVAD (left ventricular assist device) present
LVAD (left ventricular assist device) present
Acute respiratory failure with hypoxia

## 2021-10-01 NOTE — CONSULT NOTE ADULT - PROBLEM SELECTOR PROBLEM 3
Abdominal pain, unspecified abdominal location
LVAD (left ventricular assist device) present
Functional quadriplegia
LVAD (left ventricular assist device) present
Sepsis, due to unspecified organism, unspecified whether acute organ dysfunction present

## 2021-10-01 NOTE — PROGRESS NOTE ADULT - PROBLEM SELECTOR PLAN 4
- ID following and appreciate recommendations   - currently remains febrile and continues to have worsening leukocytosis  - serratia bacteremia and poss acalculous cholecystitis. B/c with gram + cocci and many enterobacter Carbapenem resistant strain and now s/p per dawn drain  - enterobacter from sputum culture 8/13 and 9/27 and serratia marcescens from sputum culture 9/14  - CT Scan 9/26 showed multifocal PNA  - currently on cefepime IV q8  - was recultured on 9/27, all cultures NGTD at this time. Please continue to monitor. Given recent febrile episodes was reculture today - ID following and appreciate recommendations   - currently remains febrile and continues to have worsening leukocytosis  - serratia bacteremia and poss acalculous cholecystitis. B/c with gram + cocci and many enterobacter Carbapenem resistant strain and now s/p per dawn drain  - enterobacter from sputum culture 8/13 and 9/27 and serratia marcescens from sputum culture 9/14  - CT Scan 9/26 showed multifocal PNA  - currently on meropenum IV and Vanco IV  - was recultured on 9/30 and positive gram negative rods

## 2021-10-01 NOTE — PROGRESS NOTE ADULT - PROBLEM SELECTOR PLAN 6
-  was initially decannulated on 9/23 and was ultimately reintubated on 9/26 after developing acute respiratory distress -  was initially decannulated on 9/23 and was ultimately reintubated on 9/26 after developing acute respiratory distress  - is tentatively schedule to undergo trach placement with ENT on Monday 10/4 @1130 am

## 2021-10-01 NOTE — PROGRESS NOTE ADULT - ASSESSMENT
65 year old male with history of Stage D HF due to NICM on LVAD, TV annuloplasty ring 9/12/17 as destination therapy due to severe peripheral artery disease with significant stenosis  SIADH, Depression, CKD-3 with hyperkalemia, admitted 6/14/21 with symptomatic anemia with Hgb 4.5 in setting of INR 8.8, thereafter with complicated hospital course including VAP, serratia bacteremia with acalculous cholecystitis s/p percutaneous tube, abdominal pain s/p attempted SMA stent on 9/10/21, RP bleed, now reintubated in setting of aspiration PNA.     Endocrine consulted for management of hyperthyroidism in the setting of recent amiodarone use and 2 IV contrast CTs with (7/28 and 8/13) and steroid induced hyperglycemia on tube feeds, now resolved    1. 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 propranolol on hold due to low BP.    Recs  -Worsening TFTs are likely related to recent contrast loads  -c/w Methimazole 20 mg TID   -Check Free T4 and total T3 on 10/3  -Please continue to monitor CMP daily to assess bilirubin and LFTs. MMI can have rare SE of agranuocytosis and hepatic dysfuntion  current increase in bili is multifactorial and likely also component of venous congestion and would not stop MMI for increase in bili,  monitor LFTS closely     2. Steroid induced hyperglycemia, s/p completion of steroids  -Continue low dose correctional scale q6 hrs

## 2021-10-01 NOTE — CONSULT NOTE ADULT - ASSESSMENT
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 coli UTIs, driveline drainage (1/7/21) and COVID-19 (back in April 2020) with prolonged hospitalization c/b acute hypoxic respiratory failure in the setting of refractory nausea/vomiting, currently mechanically ventilated, being treated for GNR bacteremia.

## 2021-10-01 NOTE — CONSULT NOTE ADULT - PROBLEM SELECTOR RECOMMENDATION 4
Continue monitoring H&H, FU Primary team.
per , plan for family meeting with multidisciplinary team at 2 PM on Wednesday 9/29/21  Son will be participating in conversation as well
supratherapeutic INR  GIB now resolved, but patient of coumadine  AC risk/benefit per CTU team
requires assistance with all ADLs, PPSV2 10%  deconditioned, will benefit from therapy once stable

## 2021-10-01 NOTE — PROGRESS NOTE ADULT - SUBJECTIVE AND OBJECTIVE BOX
RICKY HAMPTON  MRN-[id number]  Patient is a 66y old  Male who presents with a chief complaint of Anemia, Supratherapeutic INR, Dark Stools (01 Oct 2021 10:49)    HPI:  65M PMH ACC/AHA stage D HF due to NICM HM2 LVAD , TV annuloplasty ring 17 as destination therapy due to severe peripheral artery disease with significant stenosis  SIADH, Depression, CKD-3 with hyperkalemia, past E. coli UTIs, driveline drainage (21) and COVID-19 (back in 2020)  He was recently seen in clinic where he complained of abdominal pain and dark stools w constipation back in May. He presents to [facility] ER today weakness and fatigue, moderate and + Black stools for three days, on coumadin secondary to warfarin use in the setting of an LVAD. Patient has required transfusions for GIB in the past. Mostly recently back in 2021 pt had anemia with dark stools. No interventions was done at that time. However Last Endoscopy was done in 2020 (negative). Today labs show patient is anemic with H/H of 4.5/16.3,. INR is 8.84 MAP in the 90s, Temp 35.1. He denies any chest pain, shortness of breath, dizziness, abd pain, nausea or vomiting.       (2021 16:57)      Surgery/Hospital Course:   admit for melena w/ anemia, INR 8.84   6/15 Capsul study (+) for small bowel bleed, balloon endoscopy (old blood in prox ileum); post EGD - septic w/ L opacity, re-intubated for concern for aspiration, TTE (Mod MR, decrease biV w/ interventricular septum boweing towards R)   bronch    +C Diff    CT C/A/P: Fluid filled colon which may be 2/2 rapid transit. Small bilateral pleffs with associates. Compressive atelectasis New ISABELLE & LLL  parenchymal opacities, suspicious for pneumonia. 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    #8 Shiley trach at bedside    LVAD speed increased to 9200   Bronch   TC since . Patient transferred to SDU.    INR today 2.64.  H&H 7.3/24 this AM.  Will repeat CBC at noon, and will send stool guaiac Patient with persistent abdominal tenderness, rate of tube feeds decreased.  No nausea/vomiting.     INR today 2.4. H&H 9.1/28.6 low flow overnight /N&V, refusing Tube feeds on D5 1/2 normal  @50 cc/hr   INR 2.69  H&H 7.7/.1 refusing Tube feeds on D5 1/2 normal  @50 cc/hr. This am + BM Melena Dr Oneill HF  aware- PRBC x1  GI team consulted -  NPO  plan on study in am-  D/w Dr Cadet Patient  to return to CTU for further management; 1PRBCS    Post op INR 2.2 today.  No bleeding. BC + for SM.  Pt is hypotensive requiring pressor and inotropic support.  ID follow up today on Cefepime will follow.   R PTC for PTX    CT C/A/P: sub q emphysema in R chest wall, GGO RUL, small ascites CTH negative; Abd US: GB thickening, pericholecystic fluid     Perchole drain in place continues to drain total output overnight 133.  Fever today 38.8    duplex LE negative    Patient with persistent abdominal pain, refusing tube feeds and medications, Psych consulted   CTA A/P ordered to r/o mesenteric ischemia 2/2 persistent anorexia, nausea, vomiting. Revealed:  Evaluation of the mesenteric vessels is limited by streak artifact from LVAD. There appears to be severe stenosis of the proximal SMA; abdominal mesenteric doppler is recommended for further evaluation. 2.  No small bowel findings to suggest acute mesenteric ischemia. 3.  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  8/15: Cultures resulted BC 1/2 +GPC in clusters, SC enterobacter; mesenteric duplex: borderline stenosis of proximal SMA  : CT C/A/P noncon: Nondular opacities in R lung apex w/cavitation, abd nl  :  Continue current care, treatment of thyrotoxicosis with medications as per endocrine, d/c ABX as per team.    RUQ sono: Contracted gallbladder with cholecystostomy tube in place.  9/10 failed SMA stent, L fem PSA, s/p 3U PRCB   CTA Abd/Pelvis L RP hematoma    Trach decannulated    intubated fro resp distress for increased WOB, LL pneumonia; CT C/A/P: progressive ISABELLE opacities and L consolidations, multifocal pneumonia    TTE (EF 25%, decreased RV, mod MR)     Today:  SCx  +Enterobacter cloacae complex, repeat BCx sent yesterday with +GPR with TMAX temp 101.2F, Cefepime switched to Meropenem, also started Vancomycin solution for C.diff prophylaxis. Tolerating tube feeds, Vital 1.5 shantelle @ 20cc/hr, increase as tolerated. Continue pressor support with IV Vasopressin, maintain MAP 65-70s. Scheduled for re-trach on Monday 10/4 with ENT.    REVIEW OF SYSTEMS:  Unable to obtain, pt intubated and sedated     ICU Vital Signs Last 24 Hrs  T(C): 37.5 (01 Oct 2021 08:00), Max: 38.4 (30 Sep 2021 12:00)  T(F): 99.5 (01 Oct 2021 08:00), Max: 101.2 (30 Sep 2021 12:00)  HR: 79 (01 Oct 2021 10:45) (71 - 110)  BP: --  BP(mean): --  ABP: 100/68 (01 Oct 2021 10:45) (28/ - 107/75)  ABP(mean): 79 (01 Oct 2021 10:45) (28 - 89)  RR: 20 (01 Oct 2021 10:45) (10 - 47)  SpO2: 100% (01 Oct 2021 10:45) (99% - 100%)      Physical Exam:  Gen:  intubated   CNS: moves all extremities to command on low dose sedation   Neck: no JVD, gauze over trach wound  RES : coarse breath sounds, no wheezing    CVS: +LVAD hum   Abd: Soft. Sybil drain with bilious fluid. Positive BS throughout. Mild RUQ abdominal tenderness by perc sybil.  Skin: No rash, erythema, cyanosis.  Vasc: Warm and well-perfused.  Ext:  no edema    ============================I/O===========================   I&O's Detail    30 Sep 2021 07:01  -  01 Oct 2021 07:00  --------------------------------------------------------  IN:    Albumin 5%  - 250 mL: 500 mL    Dexmedetomidine: 554.4 mL    Enteral Tube Flush: 300 mL    IV PiggyBack: 400 mL    IV PiggyBack: 100 mL    Miscellaneous Tube Feedin mL    Norepinephrine: 85.4 mL    Propofol: 29.4 mL    sodium chloride 0.9%: 240 mL    Vasopressin: 66 mL  Total IN: 2395.2 mL    OUT:    Drain (mL): 65 mL    Indwelling Catheter - Urethral (mL): 870 mL    Voided (mL): 850 mL  Total OUT: 1785 mL    Total NET: 610.2 mL      01 Oct 2021 07:01  -  01 Oct 2021 11:26  --------------------------------------------------------  IN:    Albumin 5%  - 250 mL: 250 mL    Dexmedetomidine: 92.4 mL    Miscellaneous Tube Feedin mL    sodium chloride 0.9%: 40 mL    Vasopressin: 5 mL  Total IN: 447.4 mL    OUT:    Indwelling Catheter - Urethral (mL): 235 mL  Total OUT: 235 mL    Total NET: 212.4 mL        ============================ LABS =========================                        9.1    25.00 )-----------( 53       ( 01 Oct 2021 01:23 )             28.2     10-01    146<H>  |  117<H>  |  24<H>  ----------------------------<  183<H>  4.6   |  17<L>  |  0.63    Ca    9.6      01 Oct 2021 01:25  Phos  3.5     10-01  Mg     1.9     10-01    TPro  6.6  /  Alb  3.3  /  TBili  4.0<H>  /  DBili  x   /  AST  15  /  ALT  16  /  AlkPhos  150<H>  10-01    LIVER FUNCTIONS - ( 01 Oct 2021 01:25 )  Alb: 3.3 g/dL / Pro: 6.6 g/dL / ALK PHOS: 150 U/L / ALT: 16 U/L / AST: 15 U/L / GGT: x             ABG - ( 01 Oct 2021 01:15 )  pH, Arterial: 7.40  pH, Blood: x     /  pCO2: 32    /  pO2: 161   / HCO3: 20    / Base Excess: -4.3  /  SaO2: 99.7                ======================Micro/Rad/Cardio=================  Culture: Reviewed   CXR: Reviewed  Echo:Reviewed  ======================================================  PAST MEDICAL & SURGICAL HISTORY:  CHF (congestive heart failure)    CAD (coronary artery disease)    Depression    Pleural effusion    History of 2019 novel coronavirus disease (COVID-19)  2020    Hemorrhoids    Bleeding hemorrhoids    Peripheral arterial disease    Claudication    BPH with urinary obstruction    ACC/AHA stage D systolic heart failure    Anticoagulation goal of INR 2.0 to 2.5    Falls    Clavicle fracture    CKD (chronic kidney disease), stage III    Iron deficiency anemia    H/O epistaxis    Vertigo    GI bleed    S/P TVR (tricuspid valve repair)    S/P ventricular assist device    S/P endoscopy      ====================ASSESSMENT ==============  Stage D Nonischemic Cardiomyopathy, Status Post HM2 on 2017    Cardiogenic shock  Hemodynamic instability   Acute hypoxemic respiratory failure s/p trach , decannulated on ; Reintubated on    GI bleed , Status Post Enteroscopy   Anemia, in setting of melena   Chronic Kidney Disease  Stress hyperglycemia   C.diff positive on    Hypovolemic shock  Septic shock  Leukocytosis  GB thickening/percholecystic s/p perc choley by IT   SMA stenosis  Serratia/citrobacter pneumonia   Stenotrophomonas pneumonia   Enterobacter pneumonia   Nasuea/vomiting  Deconditioning    Plan:  ====================== NEUROLOGY=====================  Sedated with IV Precedex for vent synchrony   Tylenol PRN for analgesia     acetaminophen    Suspension .. 650 milliGRAM(s) Enteral Tube every 6 hours PRN Mild Pain (1 - 3)  dexMEDEtomidine Infusion 1.5 MICROgram(s)/kG/Hr (23.1 mL/Hr) IV Continuous <Continuous>    ==================== RESPIRATORY======================  Acute hypoxemic respiratory failure s/p #8 shiley trach on ; decannulated on ; Reintubated on    Continue close monitoring of respiratory rate and breathing pattern, following of ABG’s with A-line monitoring, continuous pulse oximetry monitoring.   Scheduled for re-trach on Monday 10/4 with ENT    Mechanical Ventilation:  Mode: AC/ CMV (Assist Control/ Continuous Mandatory Ventilation)  RR (machine): 20  TV (machine): 500  FiO2: 40  PEEP: 5  ITime: 1  MAP: 12  PIP: 27      ====================CARDIOVASCULAR==================  Stage D Nonischemic Cardiomyopathy, Status Post HM2 on 2017; LVAD settings 9200, flow 4.9  TTE : EF 12%, mild-mod AR, mild-mod TR, severe global LV systolic dysfxn, RV enlargement with decreased fxn  Continue pressor support with IV Vasopressin, maintain MAP 65-70s    vasopressin Infusion 0.067 Unit(s)/Min (4 mL/Hr) IV Continuous <Continuous>    ===================HEMATOLOGIC/ONC ===================  CTA A/P on  +L RP hematoma   Acute blood loss anemia, monitor H&H/Plts    Platelets downtrending, possible in setting of sepsis   Holding coumadin and ASA given recurrent GI bleeding, continue monitoring LDH     ===================== RENAL =========================  Continue monitoring urine output, I&OS, BUN/Cr   Euvolemic, even fluid balance, currently off diuretics.    Replete lytes PRN. Keep K> 4 and Mg >2    ==================== GASTROINTESTINAL===================  S/p cholecystostomy tube placed on  with IR  Tolerating tube feeds, Vital 1.5 shantelle @ 20cc/hr, increase as tolerated   CTA A/P : Evaluation of the mesenteric vessels is limited by streak artifact from LVAD. There appears to be severe stenosis of the proximal SMA; abdominal mesenteric doppler is recommended for further evaluation. 2.  No small bowel findings to suggest acute mesenteric ischemia. 3.  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  Mesenteric duplex on 8/15 borderline stenosis of proximal SMA, s/p failed SMA stent, L fem RP hematoma on 9/10; No intervention for cholecystostomy tube, PEJ  Reglan for gut motility    multivitamin 1 Tablet(s) Oral daily  GI prophylaxis, pantoprazole  Injectable 40 milliGRAM(s) IV Push every 12 hours  simethicone 80 milliGRAM(s) Chew every 8 hours PRN Gas  sodium chloride 0.9%. 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       Per endocrine      - c/w Methimazole, adjust based on labs        - Check Free T4 and total T3       - Propranolol dc'd yesterday in setting of HTN     insulin lispro (ADMELOG) corrective regimen sliding scale   SubCutaneous every 6 hours  methimazole 20 milliGRAM(s) Oral every 8 hours    ========================INFECTIOUS DISEASE================  TMAX 101.2F, WBC downtrending 28.31->17.63   Continue trending WBC and monitoring fever curve   CT C/A/P : progressive ISABELLE opacities and L consolidations, multifocal pneumonia  SCx  +Enterobacter cloacae complex, repeat BCx sent yesterday with GPR, Cefepime switched to Meropenem this AM  Aso started Vancomycin solution for C.diff prophylaxis.     meropenem  IVPB 1000 milliGRAM(s) IV Intermittent every 8 hours  vancomycin    Solution 125 milliGRAM(s) Oral every 12 hours      Patient requires continuous monitoring with bedside rhythm monitoring, pulse ox monitoring, and intermittent blood gas analysis. Care plan discussed with ICU care team. Patient remained critical and at risk for life threatening decompensation.     By signing my name below, I, Agnieszka Cherry, attest that this documentation has been prepared under the direction and in the presence of CLAUDIA Bey   Electronically signed: Romel Shaffer, 10-01-21 @ 11:26    I, Luciano Ayala, personally performed the services described in this documentation. all medical record entries made by the romel were at my direction and in my presence. I have reviewed the chart and agree that the record reflects my personal performance and is accurate and complete  Electronically signed: CLAUDIA Bey        RICKY HAMPTON  MRN-[id number]  Patient is a 66y old  Male who presents with a chief complaint of Anemia, Supratherapeutic INR, Dark Stools (01 Oct 2021 10:49)    HPI:  65M PMH ACC/AHA stage D HF due to NICM HM2 LVAD , TV annuloplasty ring 17 as destination therapy due to severe peripheral artery disease with significant stenosis  SIADH, Depression, CKD-3 with hyperkalemia, past E. coli UTIs, driveline drainage (21) and COVID-19 (back in 2020)  He was recently seen in clinic where he complained of abdominal pain and dark stools w constipation back in May. He presents to [facility] ER today weakness and fatigue, moderate and + Black stools for three days, on coumadin secondary to warfarin use in the setting of an LVAD. Patient has required transfusions for GIB in the past. Mostly recently back in 2021 pt had anemia with dark stools. No interventions was done at that time. However Last Endoscopy was done in 2020 (negative). Today labs show patient is anemic with H/H of 4.5/16.3,. INR is 8.84 MAP in the 90s, Temp 35.1. He denies any chest pain, shortness of breath, dizziness, abd pain, nausea or vomiting.       (2021 16:57)      Surgery/Hospital Course:   admit for melena w/ anemia, INR 8.84   6/15 Capsul study (+) for small bowel bleed, balloon endoscopy (old blood in prox ileum); post EGD - septic w/ L opacity, re-intubated for concern for aspiration, TTE (Mod MR, decrease biV w/ interventricular septum boweing towards R)   bronch    +C Diff    CT C/A/P: Fluid filled colon which may be 2/2 rapid transit. Small bilateral pleffs with associates. Compressive atelectasis New ISABELLE & LLL  parenchymal opacities, suspicious for pneumonia. 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    #8 Shiley trach at bedside    LVAD speed increased to 9200   Bronch   TC since . Patient transferred to SDU.    INR today 2.64.  H&H 7.3/24 this AM.  Will repeat CBC at noon, and will send stool guaiac Patient with persistent abdominal tenderness, rate of tube feeds decreased.  No nausea/vomiting.     INR today 2.4. H&H 9.1/28.6 low flow overnight /N&V, refusing Tube feeds on D5 1/2 normal  @50 cc/hr   INR 2.69  H&H 7.7/.1 refusing Tube feeds on D5 1/2 normal  @50 cc/hr. This am + BM Melena Dr Oneill HF  aware- PRBC x1  GI team consulted -  NPO  plan on study in am-  D/w Dr Cadet Patient  to return to CTU for further management; 1PRBCS    Post op INR 2.2 today.  No bleeding. BC + for SM.  Pt is hypotensive requiring pressor and inotropic support.  ID follow up today on Cefepime will follow.   R PTC for PTX    CT C/A/P: sub q emphysema in R chest wall, GGO RUL, small ascites CTH negative; Abd US: GB thickening, pericholecystic fluid     Perchole drain in place continues to drain total output overnight 133.  Fever today 38.8    duplex LE negative    Patient with persistent abdominal pain, refusing tube feeds and medications, Psych consulted   CTA A/P ordered to r/o mesenteric ischemia 2/2 persistent anorexia, nausea, vomiting. Revealed:  Evaluation of the mesenteric vessels is limited by streak artifact from LVAD. There appears to be severe stenosis of the proximal SMA; abdominal mesenteric doppler is recommended for further evaluation. 2.  No small bowel findings to suggest acute mesenteric ischemia. 3.  Focal dissections involving the right and left common iliac arteries.  8/15: Cultures resulted BC 1/2 +GPC in clusters, SC enterobacter; mesenteric duplex: borderline stenosis of proximal SMA  : CT C/A/P noncon: Nondular opacities in R lung apex w/cavitation, abd nl  :  Continue current care, treatment of thyrotoxicosis with medications as per endocrine, d/c ABX as per team.    RUQ sono: Contracted gallbladder with cholecystostomy tube in place.  9/10 failed SMA stent, L fem PSA, s/p 3U PRCB   CTA Abd/Pelvis L RP hematoma    Trach decannulated    intubated fro resp distress for increased WOB, LL pneumonia; CT C/A/P: progressive ISABELLE opacities and L consolidations, multifocal pneumonia    TTE (EF 25%, decreased RV, mod MR)     Today:  SCx  +Enterobacter cloacae complex, repeat BCx sent yesterday with +GNR with TMAX temp 101.2F, Cefepime switched to Meropenem, also started Vancomycin PO solution for C.diff prophylaxis. Tolerating tube feeds, Vital 1.5 shantelle @ 20cc/hr, increase as tolerated. Continue pressor support with IV Vasopressin, maintain MAP 65-70s. Scheduled for re-trach on Monday 10/4 with ENT.    REVIEW OF SYSTEMS:  Unable to obtain, pt intubated and sedated     ICU Vital Signs Last 24 Hrs  T(C): 37.5 (01 Oct 2021 08:00), Max: 38.4 (30 Sep 2021 12:00)  T(F): 99.5 (01 Oct 2021 08:00), Max: 101.2 (30 Sep 2021 12:00)  HR: 79 (01 Oct 2021 10:45) (71 - 110)  BP: --  BP(mean): --  ABP: 100/68 (01 Oct 2021 10:45) (/ - 107/75)  ABP(mean): 79 (01 Oct 2021 10:45) (28 - 89)  RR: 20 (01 Oct 2021 10:45) (10 - 47)  SpO2: 100% (01 Oct 2021 10:45) (99% - 100%)      Physical Exam:  Gen:  intubated   CNS: moves all extremities to command on low dose sedation   Neck: no JVD, gauze over trach wound  RES : coarse breath sounds, no wheezing    CVS: +LVAD hum   Abd: Soft. Sybil drain with bilious fluid. Positive BS throughout. Mild RUQ abdominal tenderness by perc sybil.  Skin: No rash, erythema, cyanosis.  Vasc: Warm and well-perfused.  Ext:  no edema    ============================I/O===========================   I&O's Detail    30 Sep 2021 07:01  -  01 Oct 2021 07:00  --------------------------------------------------------  IN:    Albumin 5%  - 250 mL: 500 mL    Dexmedetomidine: 554.4 mL    Enteral Tube Flush: 300 mL    IV PiggyBack: 400 mL    IV PiggyBack: 100 mL    Miscellaneous Tube Feedin mL    Norepinephrine: 85.4 mL    Propofol: 29.4 mL    sodium chloride 0.9%: 240 mL    Vasopressin: 66 mL  Total IN: 2395.2 mL    OUT:    Drain (mL): 65 mL    Indwelling Catheter - Urethral (mL): 870 mL    Voided (mL): 850 mL  Total OUT: 1785 mL    Total NET: 610.2 mL      01 Oct 2021 07:01  -  01 Oct 2021 11:26  --------------------------------------------------------  IN:    Albumin 5%  - 250 mL: 250 mL    Dexmedetomidine: 92.4 mL    Miscellaneous Tube Feedin mL    sodium chloride 0.9%: 40 mL    Vasopressin: 5 mL  Total IN: 447.4 mL    OUT:    Indwelling Catheter - Urethral (mL): 235 mL  Total OUT: 235 mL    Total NET: 212.4 mL        ============================ LABS =========================                        9.1    25.00 )-----------( 53       ( 01 Oct 2021 01:23 )             28.2     10    146<H>  |  117<H>  |  24<H>  ----------------------------<  183<H>  4.6   |  17<L>  |  0.63    Ca    9.6      01 Oct 2021 01:25  Phos  3.5     10-01  Mg     1.9     10-01    TPro  6.6  /  Alb  3.3  /  TBili  4.0<H>  /  DBili  x   /  AST  15  /  ALT  16  /  AlkPhos  150<H>  10-01    LIVER FUNCTIONS - ( 01 Oct 2021 01:25 )  Alb: 3.3 g/dL / Pro: 6.6 g/dL / ALK PHOS: 150 U/L / ALT: 16 U/L / AST: 15 U/L / GGT: x             ABG - ( 01 Oct 2021 01:15 )  pH, Arterial: 7.40  pH, Blood: x     /  pCO2: 32    /  pO2: 161   / HCO3: 20    / Base Excess: -4.3  /  SaO2: 99.7      ======================Micro/Rad/Cardio=================  Culture: Reviewed   CXR: Reviewed  Echo:Reviewed  ======================================================  PAST MEDICAL & SURGICAL HISTORY:  CHF (congestive heart failure)    CAD (coronary artery disease)    Depression    Pleural effusion    History of 2019 novel coronavirus disease (COVID-19)  2020    Hemorrhoids    Bleeding hemorrhoids    Peripheral arterial disease    Claudication    BPH with urinary obstruction    ACC/AHA stage D systolic heart failure    Anticoagulation goal of INR 2.0 to 2.5    Falls    Clavicle fracture    CKD (chronic kidney disease), stage III    Iron deficiency anemia    H/O epistaxis    Vertigo    GI bleed    S/P TVR (tricuspid valve repair)    S/P ventricular assist device    S/P endoscopy      ====================ASSESSMENT ==============  Stage D Nonischemic Cardiomyopathy, Status Post HM2 on 2017    Cardiogenic shock  Hemodynamic instability   Acute hypoxemic respiratory failure s/p trach , decannulated on ; Reintubated on    GI bleed , Status Post Enteroscopy   Anemia, in setting of melena   Chronic Kidney Disease  Stress hyperglycemia   C.diff positive on    Hypovolemic shock  Septic shock  Leukocytosis  GB thickening/percholecystic s/p perc choley by IT   SMA stenosis  Serratia/citrobacter pneumonia   Stenotrophomonas pneumonia   Enterobacter pneumonia   Nasuea/vomiting  Deconditioning    Plan:  ====================== NEUROLOGY=====================  Sedated with IV Precedex for vent synchrony   Tylenol PRN for analgesia     acetaminophen    Suspension .. 650 milliGRAM(s) Enteral Tube every 6 hours PRN Mild Pain (1 - 3)  dexMEDEtomidine Infusion 1.5 MICROgram(s)/kG/Hr (23.1 mL/Hr) IV Continuous <Continuous>    ==================== RESPIRATORY======================  Acute hypoxemic respiratory failure s/p #8 shiley trach on ; decannulated on ; Reintubated on    Continue close monitoring of respiratory rate and breathing pattern, following of ABG’s with A-line monitoring, continuous pulse oximetry monitoring.   Scheduled for re-trach on Monday 10/4 with ENT    Mechanical Ventilation:  Mode: AC/ CMV (Assist Control/ Continuous Mandatory Ventilation)  RR (machine): 20  TV (machine): 500  FiO2: 40  PEEP: 5  ITime: 1  MAP: 12  PIP: 27      ====================CARDIOVASCULAR==================  Stage D Nonischemic Cardiomyopathy, Status Post HM2 on 2017; LVAD settings 9200, flow 4.9  TTE : EF 12%, mild-mod AR, mild-mod TR, severe global LV systolic dysfxn, RV enlargement with decreased fxn  Continue pressor support with IV Vasopressin, maintain MAP 65-70s    vasopressin Infusion 0.067 Unit(s)/Min (4 mL/Hr) IV Continuous <Continuous>    ===================HEMATOLOGIC/ONC ===================  CTA A/P on  +L RP hematoma   Acute blood loss anemia, monitor H&H/Plts    Platelets downtrending, possible in setting of sepsis   Holding coumadin and ASA given recurrent GI bleeding, continue monitoring LDH     ===================== RENAL =========================  Continue monitoring urine output, I&OS, BUN/Cr   Euvolemic, even fluid balance, currently off diuretics.    Replete lytes PRN. Keep K> 4 and Mg >2    ==================== GASTROINTESTINAL===================  S/p cholecystostomy tube placed on  with IR  Tolerating tube feeds, Vital 1.5 shantelle @ 20cc/hr, increase as tolerated   CTA A/P : Evaluation of the mesenteric vessels is limited by streak artifact from LVAD. There appears to be severe stenosis of the proximal SMA; abdominal mesenteric doppler is recommended for further evaluation. 2.  No small bowel findings to suggest acute mesenteric ischemia. 3.  Focal dissections involving the right and left common iliac arteries.  Mesenteric duplex on 8/15 borderline stenosis of proximal SMA, s/p failed SMA stent, L fem RP hematoma on 9/10; No intervention for cholecystostomy tube/PEJ at this time   Reglan for gut motility    multivitamin 1 Tablet(s) Oral daily  GI prophylaxis, pantoprazole  Injectable 40 milliGRAM(s) IV Push every 12 hours  simethicone 80 milliGRAM(s) Chew every 8 hours PRN Gas  sodium chloride 0.9%. 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       Per endocrine      - c/w Methimazole, adjust based on labs        - Check Free T4 and total T3       - Propranolol dc'd yesterday in setting of Hypotension    insulin lispro (ADMELOG) corrective regimen sliding scale   SubCutaneous every 6 hours  methimazole 20 milliGRAM(s) Oral every 8 hours    ========================INFECTIOUS DISEASE================  TMAX 101.2F, WBC downtrending 28.31->17.63   Continue trending WBC and monitoring fever curve   CT C/A/P : progressive ISABELLE opacities and L consolidations, multifocal pneumonia  SCx  +Enterobacter cloacae complex, repeat BCx sent yesterday with GNR, Cefepime switched to Meropenem this AM  Aso started Vancomycin PO solution for C.diff prophylaxis.     meropenem  IVPB 1000 milliGRAM(s) IV Intermittent every 8 hours  vancomycin    Solution 125 milliGRAM(s) Oral every 12 hours      Patient requires continuous monitoring with bedside rhythm monitoring, pulse ox monitoring, and intermittent blood gas analysis. Care plan discussed with ICU care team. Patient remained critical and at risk for life threatening decompensation.     By signing my name below, I, Agnieszka Cherry, attest that this documentation has been prepared under the direction and in the presence of CLAUDIA Bey   Electronically signed: Cesia Shaffer, 10-01-21 @ 11:26    I, Luciano Ayala, personally performed the services described in this documentation. all medical record entries made by the luisibmel were at my direction and in my presence. I have reviewed the chart and agree that the record reflects my personal performance and is accurate and complete  Electronically signed: CLAUDIA Bey

## 2021-10-01 NOTE — PROGRESS NOTE ADULT - ASSESSMENT
65 YO M with a history of stage D NICM s/p HM2 on 9/2017 as DT (due to severe PAD) with TV ring, prior COVID-19 infection 4/2020, recurrent syncope post LVAD s/p ILR, and chronic abdominal pain with prior negative workup who was admitted 6/14/21 with symptomatic anemia with Hgb 4.5 in setting of INR 8.8 without hemodynamic instability and has since had a protracted hospitalization. He was transfused and underwent VCE which showed active bleeding in the mid small bowel but subsequent enteroscopy 6/15 did not reveal any active bleeding. He acutely decompensated after procedure with fever/hypertension, low flow alarms, and pulmonary infiltrate with hypoxia requiring intubation from probable aspiration PNA. He was unable to extubated and has since undergone tracheostomy but tolerating persistent trach collar and nearing ability for decannulation. 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 He underwent angiogram on 9/10, stent was unable to be placed and course was then complicated by RP bleed. He continued to have persistent leukocytosis and febrile episodes and was noted to have positive sputum culture for serratia marcescens and completed his course of abx. He was initially decannulated on 9/23.Recently he started having multiples of melena, emesis and went into acte respiratory distress and was ultimately re-intubated on 9/26.     Today he became febrile with rectal temp of 105.8 and hypotensive requiring increased amount of pressors for support. He was placed back on antibiotics and cultures were taken. His overall prognosis is poor. Will need to discuss GOC with family.  67 YO M with a history of stage D NICM s/p HM2 on 9/2017 as DT (due to severe PAD) with TV ring, prior COVID-19 infection 4/2020, recurrent syncope post LVAD s/p ILR, and chronic abdominal pain with prior negative workup who was admitted 6/14/21 with symptomatic anemia with Hgb 4.5 in setting of INR 8.8 without hemodynamic instability and has since had a protracted hospitalization. He was transfused and underwent VCE which showed active bleeding in the mid small bowel but subsequent enteroscopy 6/15 did not reveal any active bleeding. He acutely decompensated after procedure with fever/hypertension, low flow alarms, and pulmonary infiltrate with hypoxia requiring intubation from probable aspiration PNA. He was unable to extubated and has since undergone tracheostomy but tolerating persistent trach collar and nearing ability for decannulation. 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 He underwent angiogram on 9/10, stent was unable to be placed and course was then complicated by RP bleed. He continued to have persistent leukocytosis and febrile episodes and was noted to have positive sputum culture for serratia marcescens and completed his course of abx. He was initially decannulated on 9/23.Recently he started having multiples of melena, emesis and went into acte respiratory distress and was ultimately re-intubated on 9/26.     He remains febrile and recent blood cultures are positive for gram negative rods, currently on Vanco IV and Meropenum IV. He remains on pressors for support, which will continue to wean as tolerated. Is tentatively schedule to undergo trach for comfort measures with ENT on Monday 10/4. His overall prognosis is guarded. Will need to discuss GOC with family.  67 YO M with a history of stage D NICM s/p HM2 on 9/2017 as DT (due to severe PAD) with TV ring, prior COVID-19 infection 4/2020, recurrent syncope post LVAD s/p ILR, and chronic abdominal pain with prior negative workup who was admitted 6/14/21 with symptomatic anemia with Hgb 4.5 in setting of INR 8.8 without hemodynamic instability and has since had a protracted hospitalization. He was transfused and underwent VCE which showed active bleeding in the mid small bowel but subsequent enteroscopy 6/15 did not reveal any active bleeding. He acutely decompensated after procedure with fever/hypertension, low flow alarms, and pulmonary infiltrate with hypoxia requiring intubation from probable aspiration PNA. He was unable to extubated and has since undergone tracheostomy but tolerating persistent trach collar and nearing ability for decannulation. 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 He underwent angiogram on 9/10, stent was unable to be placed and course was then complicated by RP bleed. He continued to have persistent leukocytosis and febrile episodes and was noted to have positive sputum culture for serratia marcescens and completed his course of abx. He was initially decannulated on 9/23. Recently he started having multiples of melena, emesis and went into acte respiratory distress and was ultimately re-intubated on 9/26.     He remains febrile and recent blood cultures are positive for gram negative rods, currently on Vanco IV and Meropenum IV. He remains on pressors for support, which will continue to wean as tolerated. Is tentatively schedule to undergo trach for comfort measures with ENT on Monday 10/4. His overall prognosis is guarded. Will need to discuss GOC with family.

## 2021-10-01 NOTE — CONSULT NOTE ADULT - PROBLEM SELECTOR RECOMMENDATION 7
Chronic, thought to be 2/2 SMA stenosis based on angio 9/11, unable to place stent due to retroperitoneal hematoma and risk for bleeding with blood thinners  currently on precedex  PAINAD score 0  If develops vomiting or nausea likely due to SMA stenosis, may try reglan PRN Chronic, thought to be 2/2 SMA stenosis based on angio 9/11, unable to place stent due to retroperitoneal hematoma and risk for bleeding with blood thinners  currently on precedex  PAINAD score 0  If develops vomiting or nausea likely due to SMA stenosis, may try Reglan 5mg IV q 6  PRN. May also consider feeding small volumes more frequently through the day as well as working with dietary to find a feeding formula the patient may be able to better tolerate.

## 2021-10-01 NOTE — CONSULT NOTE ADULT - PROBLEM SELECTOR RECOMMENDATION 6
goals are clear for ongoing medical management in setting of patient showing improvement compared to last week.  Should patient decompensate clinically, discussed with family about re-involvement of palliative team.  at this time, no further role for palliative care involvement. signing off.  discussed with CLAUDIA Sneed and KINSEY.    [phone]
Tracheostomy per patient's wishes, on Monday 10/4/21, refer [facility] note above  Code status - full code  HCP - Ms. Shah, sister  Disposition: TBD  Prognosis: poor, time TBD
Chronic, thought to be 2/2 SMA stenosis based on angio 9/11, unable to place stent  currently on Fentanyl IV, intubated, sedated  PAINAD score 0  will continue to monitor for symptoms

## 2021-10-01 NOTE — PROGRESS NOTE ADULT - PROBLEM SELECTOR PLAN 2
- Continue current speed of 9200 RPM. Speed increased this admission due to signs of inadequately unloaded left ventricle  - Will remain off all AC and Aspirin indefinitely given recurrent GI bleeding. Recently tried heparin trail and starting having episodes of melena   - Continue to trend LDH level  - May need to consider repeating ECHO, last ECHO was completed 7/26. Please repeat ECHO today.   - Palliative care following - Continue current speed of 9200 RPM. Speed increased this admission due to signs of inadequately unloaded left ventricle  - Will remain off all AC and Aspirin indefinitely given recurrent GI bleeding. Have tried heparin trails and starting having episodes of melena   - Continue to trend LDH level  - Back up battery exchanged 10/1, back up battery serial number [id number], replace in 24 months   - Palliative care following

## 2021-10-01 NOTE — CONSULT NOTE ADULT - PROBLEM SELECTOR RECOMMENDATION 2
Suggest to continue medications, monitoring, FU primary team recommendations.
Off of AC and Aspirin indefinitely given recurrent GI bleeding.   poor prognosis.
- Continue home Metoprolol XL 25 mg QD  - Continue Amio 200 mg QD  - Continue Lisinopril 5mg daily
heart failure team following  Off of AC and Aspirin indefinitely given recurrent GI bleeding.   poor prognosis
currently on vent  CPAP trials  s/p trach

## 2021-10-01 NOTE — PROGRESS NOTE ADULT - ASSESSMENT
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 coli UTIs, driveline drainage (1/7/21) and COVID-19 (back in April 2020)  He was recently seen in clinic where he complained of abdominal pain and dark stools w constipation back in May. He presents to [facility] ER today weakness and fatigue, moderate and + Black stools for three days, on coumadin secondary to warfarin use in the setting of an LVAD. Patient has required transfusions for GIB in the past. Mostly recently back in jan 2021 pt had anemia with dark stools. No interventions was done at that time. However Last Endoscopy was done in July 2020 (negative). Today labs show patient is anemic with H/H of 4.5/16.3,. INR is 8.84 MAP in the 90s,   Returned from endoscopy appearing ill tachypneic with chills with new white out of his left lung      A/p  s/p LVAD placement  admission prolonged following GI bleeding, aspiration event, CDI, VAP, chronic abdominal pain, most recently with retroperitoneal bleeding post attempted stent placement  Acute event while on the floor with possible aspiration and required re-intubation and transfer to cTU  Antibiotics currently completed and being monitored off antimicrobials    GOC discussion with family in progress and with patient directly once sedation weans    Morris Prater MD  [phone]  After 5pm/weekends [phone]               65M PMH ACC/AHA stage D HF due to NICM HM2 LVAD , TV annuloplasty ring 9/12/17 as destination therapy due to severe peripheral artery disease with significant stenosis  SIADH, Depression, CKD-3 with hyperkalemia, past E. coli UTIs, driveline drainage (1/7/21) and COVID-19 (back in April 2020)  He was recently seen in clinic where he complained of abdominal pain and dark stools w constipation back in May. He presents to [facility] ER today weakness and fatigue, moderate and + Black stools for three days, on coumadin secondary to warfarin use in the setting of an LVAD. Patient has required transfusions for GIB in the past. Mostly recently back in jan 2021 pt had anemia with dark stools. No interventions was done at that time. However Last Endoscopy was done in July 2020 (negative). Today labs show patient is anemic with H/H of 4.5/16.3,. INR is 8.84 MAP in the 90s,   Returned from endoscopy appearing ill tachypneic with chills with new white out of his left lung      A/p  s/p LVAD placement  admission prolonged following GI bleeding, aspiration event, CDI, VAP, chronic abdominal pain, most recently with retroperitoneal bleeding post attempted stent placement  Acute event while on the floor with possible aspiration and required re-intubation and transfer to cTU  Antibiotics with Meropenem  enterobacter in blood culture on 9/30  repeat blood cultures and sputum if aligned with GOC    GOC discussion with family in progress and with patient directly once sedation weans    Morris Prater MD  [phone]  After 5pm/weekends [phone]               65M PMH ACC/AHA stage D HF due to NICM HM2 LVAD , TV annuloplasty ring 9/12/17 as destination therapy due to severe peripheral artery disease with significant stenosis  SIADH, Depression, CKD-3 with hyperkalemia, past E. coli UTIs, driveline drainage (1/7/21) and COVID-19 (back in April 2020)  He was recently seen in clinic where he complained of abdominal pain and dark stools w constipation back in May. He presents to [facility] ER today weakness and fatigue, moderate and + Black stools for three days, on coumadin secondary to warfarin use in the setting of an LVAD. Patient has required transfusions for GIB in the past. Mostly recently back in jan 2021 pt had anemia with dark stools. No interventions was done at that time. However Last Endoscopy was done in July 2020 (negative). Today labs show patient is anemic with H/H of 4.5/16.3,. INR is 8.84 MAP in the 90s,   Returned from endoscopy appearing ill tachypneic with chills with new white out of his left lung      A/p  s/p LVAD placement  admission prolonged following GI bleeding, aspiration event, CDI, VAP, chronic abdominal pain, most recently with retroperitoneal bleeding post attempted stent placement  Acute event while on the floor with possible aspiration and required re-intubation and transfer to cTU  Antibiotics with Meropenem  enterobacter in blood culture on 9/30  x2 sets  source gall bladder vs lungs   repeat blood cultures and sputum if aligned with GOC    GOC discussion with family in progress and with patient directly once sedation weans    Morris Prater MD  [phone]  After 5pm/weekends [phone]

## 2021-10-02 NOTE — PROGRESS NOTE ADULT - ASSESSMENT
Assessment and Recommendation:   · Assessment	  Assessment and recommendation :  Recurrent Acute hypoxic respiratory Failure reintubated on full ventilator support FI02 is 40%  Acute blood loss anemia S/P multiple  blood transfusion   recurrent  septic shock on vasopressin   hemorrhagic shock receiving 3 unit of blood transfusion   pan culture negative on meropenem  , po vancomycin   S/P cholecystostomy tube placement by IR    AF RVR back to regular sinus Rhythm   Non ischemic cardiomyopathy continue ACE inhibitor and B-Blockers   mesenteric ischemia failure to stent SMA , no plan for repeated trial   S/P 3 unit of blood transfusion   S/P Septic shock and cardiogenic shock   Stage D systolic heart failure S/P LVAD HM2   MH2 LVAD  with  TV Annuloplasty  Severe peripheral vascular disease   severe hyperglycemia on insulin coverage    Reglan 10 mg for Gastric Motility   hyperthyroidism on Methimazole 10mg TID   critical care polyneuropathy   Anemia of Acute blood Loss   severe protein caloric malnutrition   Chronic kidney disease stage III  Depressed and withdrawn   resume NG tube feeding   GI prophylaxis with PPI   Discussed with TCV team   critical care time is 35 minutes

## 2021-10-02 NOTE — PROGRESS NOTE ADULT - SUBJECTIVE AND OBJECTIVE BOX
RICKY JOINT  MRN#: [id number]  Subjective:  Pulmonary progress  : recurrent Acute hypoxic respiratory Failure ,aspiration pneumonia, NICM  ,   65M PMH ACC/AHA stage D HF due to NICM HM2 LVAD , TV annuloplasty ring 17 as destination therapy due to severe peripheral artery disease with significant stenosis  SIADH, Depression, CKD-3 with hyperkalemia, past E. coli UTIs, driveline drainage (21) and COVID-19 (back in 2020)  He was recently seen in clinic where he complained of abdominal pain and dark stools w constipation back in May. He presents to [facility] ER today weakness and fatigue, moderate and + Black stools for three days, on coumadin secondary to warfarin use in the setting of an LVAD. Patient has required transfusions for GIB in the past. Mostly recently back in 2021 pt had anemia with dark stools. No interventions was done at that time. However Last Endoscopy was done in 2020 (negative). Today labs show patient is anemic with H/H of 4.5/16.3,. INR is 8.84 MAP in the 90s, Temp 35.1. He denies any chest pain, shortness of breath, dizziness, abd pain, nausea or vomiting. found to have  rectal bleeding underwent endoscopy ,old blood in the proximal ileum ,  develop sepsis with LL opacity given Antibiotics , Extubated , reintubated , Bronchoscopy on Zosyn for LL pneumonia  and Amiodarone S/P TV Annuloplasty , patient remain intubated on full ventilatory support .S/P multiple units of blood transfusion , remain on full ventilatory support on Precedex and propofol , new central IJ line , diarrhea C diff. +ve on po vancomycin and IV Flagyl,  mildly distended belly , fever start on cefepime 2gm q 8 hrs S/P tracheostomy .  new RT Subclavian central line continue on contact  isolation ,S/P  C-diff antibiotics, no more diarrhea back on full support mechanical ventilator , chest x ray show improvement in LLL air space disease, more awake and responsive on tube feeding no more diarrhea ,  no nausea or vomiting or diarrhea still very weak and tired , back on tube feeding ,still on po vancomycin , getting PT and OT at bed side ,   , no SOB getting stronger , improve muscle tone patient transfer to monitor bed still on contact isolation for C-Difficel colitis on 50% FI02, and change to Suresh  tube feeding still loose stool . H/H drop significantly require blood transfusion , most likely GI bleeding , IV heparin D/C ,  H/H is stable ., patient develop TR sided  pneumothorax require chest tube placement , RT IJ central line  placed , develop fever shaking chills , blood culture positive for serratia marcescens , start on cefepime .the patient  become hypoxic and hypotensive placed on full ventilatory support and Vasopressin , levophed and Dobutamine ,S/P blood transfusion on meropenem and vancomycin ,   , on and  off pressors , occasional agitation on Precedex .S/P IR cholecystostomy tube drainage placement in the RT upper Quadrant , resume anticoagulation chest x ray noted C-PAP trail lasted only for 2 hrs , new RT SC line and D/C RT IJ line , RT pig tail cathter has been removed , tolerating C-PAP trial placed on trach. collar 50% FI02 GI consultant noted on NG tube feeding as tolerated , develop AF RVR S/P  bolus Amiodarone  back to regular sinus Rhythm , Flat Affect depressed , back on tube feeding Vital AF at 60 cc/ hr .still intermittent abdominal pain , no fever saturation is accepted  back on full ventilatory support ,   on methimazole for hyperthyroidism ,  condition is the same ,still C/O Abdominal pain , white count is improving , no chest pain or SOB ,  .placed on Reglan 10 mg TID for gastric motility , depressed , withdrawn. , S/P  mesenteric angiogram , unable to stent SMA S/P 3 units of blood transfusion   RT IJ in place reassessed for stent in the SMA by vascular,  dark stool stable H/H ,surgical opinion for possible Lap cholecystectomy. over night events noted develop respiratory distress, , rectal bleeding, require intubation placed on full ventilatory support , FI02 is 50% also became hemianosmically unstable require triple pressor support with levophed , vasopressin and norsynephrine, pan culture placed  on Vancomycin IV and PO  and Zosyn, sedated with propofol kept NPO , new central line RT and left IJ cathter placed . Diflucan Added .fever of 38.5 weaned on   vasopressin , all culture are negative, resume tube feeding , BP fluctuate lethargic , white count is improving , on meropenem, vancomycin solution  , on Precedex      (2021 16:57)    PAST MEDICAL & SURGICAL HISTORY:  CHF (congestive heart failure)    CAD (coronary artery disease)  Depression    Pleural effusion    History of 2019 novel coronavirus disease (COVID-19)  2020    Hemorrhoids    Bleeding hemorrhoids    Peripheral arterial disease    Claudication    BPH with urinary obstruction    ACC/AHA stage D systolic heart failure  Anticoagulation goal of INR 2.0 to 2.5    Falls    Clavicle fracture    CKD (chronic kidney disease), stage III    Iron deficiency anemia    H/O epistaxis    Vertigo    GI bleed    S/P TVR (tricuspid valve repair)    S/P ventricular assist device    S/P endoscopy    OBJECTIVE:  ICU Vital Signs Last 24 Hrs  T(C): 38.2 (2021 10:00), Max: 38.5 (2021 12:00)  T(F): 100.8 (2021 10:00), Max: 101.3 (2021 12:00)  HR: 65 (2021 10:00) (61 - 69)  BP: --  BP(mean): --  ABP: 105/67 (2021 10:00) (90/54 - 113/64)  ABP(mean): 77 (2021 10:00) (63 - 77)  RR: 20 (2021 10:00) (19 - 35)  SpO2: 99% (2021 10:00) (96% - 100%)       @ 07: @ 07:00  --------------------------------------------------------  IN: 2693.9 mL / OUT: 1415 mL / NET: 1278.9 mL     @ 07: @ 10:49  --------------------------------------------------------  IN: 420.8 mL / OUT: 115 mL / NET: 305.8 mL  PHYSICAL EXAM: Daily   Elderly male intubated on full ventilatory support FI02 is 40%  support on  vasopressin , Precedex   Daily Weight in k.4 (2021 00:00)  HEENT:     + NCAT  + EOMI  - Conjuctival edema   - Icterus   - Thrush   - ETT  + NGT/OGT  Neck:         + FROM  RT IJ , LT IJ  lines JVD  - Nodes - Masses + Mid-line trachea - Tracheostomy  Chest:            normal A-P diameter    Lungs:          + CTA   + Rhonchi    - Rales    - Wheezing + Decreased  LT BS   - Dullness R L  Cardiac:       + S1 + S2    + RRR   - Irregular   - S3  - S4    - Murmurs   - Rub   - Hamman’s sign   Abdomen:    + BS  + Soft + Non-tender - Distended - Organomegaly - PEG .cholecystostomy tube in place  Extremities:   - Cyanosis U/L   - Clubbing  U/L  + LE/UE Edema   + Capillary refill    + Pulses   Neuro:        - Awake   -  Alert   - Confused   - Lethargic   + Sedated  + Generalized Weakness  Skin:        - Rashes    - Erythema   + Normal incisions   + IV sites intact          HOSPITAL MEDICATIONS: All medications reviewed and analyzed  MEDICATIONS  (STANDING):  amiodarone    Tablet 200 milliGRAM(s) Oral daily  chlorhexidine 0.12% Liquid 15 milliLiter(s) Oral Mucosa every 12 hours  chlorhexidine 2% Cloths 1 Application(s) Topical <User Schedule>  dexmedetomidine Infusion 0.5 MICROgram(s)/kG/Hr (9.81 mL/Hr) IV Continuous <Continuous>  dextrose 50% Injectable 50 milliLiter(s) IV Push every 15 minutes  heparin  Infusion 400 Unit(s)/Hr (12.5 mL/Hr) IV Continuous <Continuous>  Hydromorphone  Injectable 0.5 milliGRAM(s) IV Push once  insulin lispro (ADMELOG) corrective regimen sliding scale   SubCutaneous every 6 hours  pantoprazole  Injectable 40 milliGRAM(s) IV Push every 12 hours  piperacillin/tazobactam IVPB.. 3.375 Gram(s) IV Intermittent every 8 hours  propofol Infusion 20 MICROgram(s)/kG/Min (9.42 mL/Hr) IV Continuous <Continuous>  sodium chloride 0.9% lock flush 3 milliLiter(s) IV Push every 8 hours  sodium chloride 0.9%. 1000 milliLiter(s) (10 mL/Hr) IV Continuous <Continuous>    MEDICATIONS  (PRN):  acetaminophen    Suspension .. 650 milliGRAM(s) Oral every 6 hours PRN Temp greater or equal to 38C (100.4F)    LABS: All Lab data reviewed and analyzed                        8.6    18.35 )-----------( 45       ( 02 Oct 2021 00:34 )             27.5                10-    142  |  110<H>  |  18  ----------------------------<  187<H>  4.0   |  20<L>  |  0.47<L>    Ca    9.9      02 Oct 2021 00:34  Phos  2.5     10-  Mg     1.7     10-    TPro  6.8  /  Alb  3.3  /  TBili  2.1<H>  /  DBili  x   /  AST  14  /  ALT  17  /  AlkPhos  152<H>  10    Ca    9.6      01 Oct 2021 01:25  Phos  3.5     10-  Mg     1.9     10-    TPro  6.6  /  Alb  3.3  /  TBili  4.0<H>  /  DBili  x   /  AST  15  /  ALT  16  /  AlkPhos  150<H>  10-01                                                                                                                                                                  PTT - ( 2021 04:52 )  PTT:45.2 sec LIVER FUNCTIONS - ( 2021 00:42 )  Alb: 3.4 g/dL / Pro: 6.7 g/dL / ALK PHOS: 213 U/L / ALT: 15 U/L / AST: 24 U/L / GGT: x           RADIOLOGY: - Reviewed and analyzed RT Pig tail cathter  , LVAD HM2, CT scan of abdomen reviewed result noted

## 2021-10-02 NOTE — PROGRESS NOTE ADULT - SUBJECTIVE AND OBJECTIVE BOX
RICKY HAMPTON  MRN-[id number]  Patient is a 66y old  Male who presents with a chief complaint of Anemia, Supratherapeutic INR, Dark Stools (01 Oct 2021 20:00)    HPI:  65M PMH ACC/AHA stage D HF due to NICM HM2 LVAD , TV annuloplasty ring 17 as destination therapy due to severe peripheral artery disease with significant stenosis  SIADH, Depression, CKD-3 with hyperkalemia, past E. coli UTIs, driveline drainage (21) and COVID-19 (back in 2020)  He was recently seen in clinic where he complained of abdominal pain and dark stools w constipation back in May. He presents to [facility] ER today weakness and fatigue, moderate and + Black stools for three days, on coumadin secondary to warfarin use in the setting of an LVAD. Patient has required transfusions for GIB in the past. Mostly recently back in 2021 pt had anemia with dark stools. No interventions was done at that time. However Last Endoscopy was done in 2020 (negative). Today labs show patient is anemic with H/H of 4.5/16.3,. INR is 8.84 MAP in the 90s, Temp 35.1. He denies any chest pain, shortness of breath, dizziness, abd pain, nausea or vomiting.       (2021 16:57)      Surgery/Hospital Course:   admit for melena w/ anemia, INR 8.84   6/15 Capsul study (+) for small bowel bleed, balloon endoscopy (old blood in prox ileum); post EGD - septic w/ L opacity, re-intubated for concern for aspiration, TTE (Mod MR, decrease biV w/ interventricular septum boweing towards R)   bronch    +C Diff    CT C/A/P: Fluid filled colon which may be 2/2 rapid transit. Small bilateral pleffs with associates. Compressive atelectasis New ISABELLE & LLL  parenchymal opacities, suspicious for pneumonia. 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    #8 Shiley trach at bedside    LVAD speed increased to 9200   Bronch   TC since . Patient transferred to SDU.    INR today 2.64.  H&H 7.3/24 this AM.  Will repeat CBC at noon, and will send stool guaiac Patient with persistent abdominal tenderness, rate of tube feeds decreased.  No nausea/vomiting.     INR today 2.4. H&H 9.1/28.6 low flow overnight /N&V, refusing Tube feeds on D5 1/2 normal  @50 cc/hr   INR 2.69  H&H 7.7/.1 refusing Tube feeds on D5 1/2 normal  @50 cc/hr. This am + BM Melena Dr Oneill HF  aware- PRBC x1  GI team consulted -  NPO  plan on study in am-  D/w Dr Cadet Patient  to return to CTU for further management; 1PRBCS    Post op INR 2.2 today.  No bleeding. BC + for SM.  Pt is hypotensive requiring pressor and inotropic support.  ID follow up today on Cefepime will follow.   R PTC for PTX    CT C/A/P: sub q emphysema in R chest wall, GGO RUL, small ascites CTH negative; Abd US: GB thickening, pericholecystic fluid     Perchole drain in place continues to drain total output overnight 133.  Fever today 38.8    duplex LE negative    Patient with persistent abdominal pain, refusing tube feeds and medications, Psych consulted   CTA A/P ordered to r/o mesenteric ischemia 2/2 persistent anorexia, nausea, vomiting. Revealed:  Evaluation of the mesenteric vessels is limited by streak artifact from LVAD. There appears to be severe stenosis of the proximal SMA; abdominal mesenteric doppler is recommended for further evaluation. 2.  No small bowel findings to suggest acute mesenteric ischemia. 3.  Focal dissections involving the right and left common iliac arteries.  8/15: Cultures resulted BC 1/2 +GPC in clusters, SC enterobacter; mesenteric duplex: borderline stenosis of proximal SMA  : CT C/A/P noncon: Nondular opacities in R lung apex w/cavitation, abd nl  :  Continue current care, treatment of thyrotoxicosis with medications as per endocrine, d/c ABX as per team.    RUQ sono: Contracted gallbladder with cholecystostomy tube in place.  9/10 failed SMA stent, L fem PSA, s/p 3U PRCB   CTA Abd/Pelvis L RP hematoma    Trach decannulated    intubated fro resp distress for increased WOB, LL pneumonia; CT C/A/P: progressive ISABELLE opacities and L consolidations, multifocal pneumonia    TTE (EF 25%, decreased RV, mod MR)     Today:    REVIEW OF SYSTEMS:  Unable to obtain, pt intubated and sedated     ICU Vital Signs Last 24 Hrs  T(C): 37.5 (02 Oct 2021 08:00), Max: 38.1 (01 Oct 2021 18:00)  T(F): 99.5 (02 Oct 2021 08:00), Max: 100.6 (01 Oct 2021 18:00)  HR: 73 (02 Oct 2021 08:00) (66 - 87)  BP: --  BP(mean): --  ABP: 122/73 (02 Oct 2021 08:00) (63/54 - 166/165)  ABP(mean): 90 (02 Oct 2021 08:) (57 - 166)  RR: 20 (02 Oct 2021 08:) (20 - 20)  SpO2: 100% (02 Oct 2021 08:00) (100% - 100%)      Physical Exam:  Gen:  intubated   CNS: moves all extremities to command on low dose sedation   Neck: no JVD, gauze over trach wound  RES : coarse breath sounds, no wheezing    CVS: +LVAD hum   Abd: Soft. Sybil drain with bilious fluid. Positive BS throughout. Mild RUQ abdominal tenderness by perc sybil.  Skin: No rash, erythema, cyanosis.  Vasc: Warm and well-perfused.  Ext:  no edema    ============================I/O===========================   I&O's Detail    01 Oct 2021 07:01  -  02 Oct 2021 07:00  --------------------------------------------------------  IN:    Albumin 5%  - 250 mL: 250 mL    Dexmedetomidine: 554.4 mL    Enteral Tube Flush: 250 mL    IV PiggyBack: 100 mL    Miscellaneous Tube Feedin mL    sodium chloride 0.9%: 240 mL    Vasopressin: 19 mL  Total IN: 2063.4 mL    OUT:    Drain (mL): 200 mL    Indwelling Catheter - Urethral (mL): 2370 mL  Total OUT: 2570 mL    Total NET: -506.6 mL      02 Oct 2021 07:01  -  02 Oct 2021 08:28  --------------------------------------------------------  IN:    Dexmedetomidine: 23.1 mL    Miscellaneous Tube Feedin mL    sodium chloride 0.9%: 10 mL  Total IN: 63.1 mL    OUT:    Indwelling Catheter - Urethral (mL): 120 mL    Vasopressin: 0 mL  Total OUT: 120 mL    Total NET: -56.9 mL        ============================ LABS =========================                        8.6    18.35 )-----------( 45       ( 02 Oct 2021 00:34 )             27.5     10-02    142  |  110<H>  |  18  ----------------------------<  187<H>  4.0   |  20<L>  |  0.47<L>    Ca    9.9      02 Oct 2021 00:34  Phos  2.5     10-  Mg     1.7     10-    TPro  6.8  /  Alb  3.3  /  TBili  2.1<H>  /  DBili  x   /  AST  14  /  ALT  17  /  AlkPhos  152<H>  10    LIVER FUNCTIONS - ( 02 Oct 2021 00:34 )  Alb: 3.3 g/dL / Pro: 6.8 g/dL / ALK PHOS: 152 U/L / ALT: 17 U/L / AST: 14 U/L / GGT: x             ABG - ( 02 Oct 2021 00:19 )  pH, Arterial: 7.45  pH, Blood: x     /  pCO2: 32    /  pO2: 184   / HCO3: 22    / Base Excess: -1.4  /  SaO2: 99.6                ======================Micro/Rad/Cardio=================  Culture: Reviewed   CXR: Reviewed  Echo:Reviewed  ======================================================  PAST MEDICAL & SURGICAL HISTORY:  CHF (congestive heart failure)    CAD (coronary artery disease)    Depression    Pleural effusion    History of  novel coronavirus disease (COVID-19)  2020    Hemorrhoids    Bleeding hemorrhoids    Peripheral arterial disease    Claudication    BPH with urinary obstruction    ACC/AHA stage D systolic heart failure    Anticoagulation goal of INR 2.0 to 2.5    Falls    Clavicle fracture    CKD (chronic kidney disease), stage III    Iron deficiency anemia    H/O epistaxis    Vertigo    GI bleed    S/P TVR (tricuspid valve repair)    S/P ventricular assist device    S/P endoscopy      ====================ASSESSMENT ==============  Stage D Nonischemic Cardiomyopathy, Status Post HM2 on 2017    Cardiogenic shock  Hemodynamic instability   Acute hypoxemic respiratory failure s/p trach , decannulated on ; Reintubated on    GI bleed , Status Post Enteroscopy   Anemia, in setting of melena   Chronic Kidney Disease  Stress hyperglycemia   C.diff positive on    Hypovolemic shock  Septic shock  Leukocytosis  GB thickening/percholecystic s/p perc choley by IT   SMA stenosis  Serratia/citrobacter pneumonia   Stenotrophomonas pneumonia   Enterobacter pneumonia   Nasuea/vomiting  Deconditioning      Plan:  ====================== NEUROLOGY=====================  Sedated with IV Precedex for vent synchrony   Tylenol PRN for analgesia     acetaminophen    Suspension .. 650 milliGRAM(s) Enteral Tube every 6 hours PRN Mild Pain (1 - 3)  dexMEDEtomidine Infusion 1.5 MICROgram(s)/kG/Hr (23.1 mL/Hr) IV Continuous <Continuous>    ==================== RESPIRATORY======================  Acute hypoxemic respiratory failure s/p #8 shiley trach on ; decannulated on ; Reintubated on    Continue close monitoring of respiratory rate and breathing pattern, following of ABG’s with A-line monitoring, continuous pulse oximetry monitoring.   Scheduled for re-trach on Monday 10/4 with ENT      Mechanical Ventilation:  Mode: AC/ CMV (Assist Control/ Continuous Mandatory Ventilation)  RR (machine): 20  TV (machine): 500  FiO2: 40  PEEP: 5  ITime: 1  MAP: 10  PIP: 23      ====================CARDIOVASCULAR==================  Stage D Nonischemic Cardiomyopathy, Status Post HM2 on 2017; LVAD settings 9200, flow 4.9  TTE : EF 12%, mild-mod AR, mild-mod TR, severe global LV systolic dysfxn, RV enlargement with decreased fxn  Continue pressor support with IV Vasopressin, maintain MAP 65-70s    vasopressin Infusion 0.067 Unit(s)/Min (4 mL/Hr) IV Continuous <Continuous>    ===================HEMATOLOGIC/ONC ===================  CTA A/P on  +L RP hematoma   Acute blood loss anemia, monitor H&H/Plts    Platelets downtrending, possible in setting of sepsis   Holding coumadin and ASA given recurrent GI bleeding, continue monitoring LDH     ===================== RENAL =========================  Continue monitoring urine output, I&OS, BUN/Cr   Euvolemic, even fluid balance, currently off diuretics.    Replete lytes PRN. Keep K> 4 and Mg >2    ==================== GASTROINTESTINAL===================  Tolerating tube feeds, Vital 1.5 shantelle @ 20cc/hr, increase as tolerated   CTA A/P : Evaluation of the mesenteric vessels is limited by streak artifact from LVAD. There appears to be severe stenosis of the proximal SMA; abdominal mesenteric doppler is recommended for further evaluation. 2.  No small bowel findings to suggest acute mesenteric ischemia. 3.  Focal dissections involving the right and left common iliac arteries.  Mesenteric duplex on 8/15 borderline stenosis of proximal SMA, s/p failed SMA stent, L fem RP hematoma on 9/10; No intervention for cholecystostomy tube/PEJ at this time   Reglan for gut motility      multivitamin 1 Tablet(s) Oral daily  GI prophylaxis, pantoprazole  Injectable 40 milliGRAM(s) IV Push every 12 hours  simethicone 80 milliGRAM(s) Chew every 8 hours PRN Gas  sodium chloride 0.9%. 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       Per endocrine      - c/w Methimazole, adjust based on labs        - Check Free T4 and total T3       - Propranolol dc'd yesterday in setting of Hypotension      insulin lispro (ADMELOG) corrective regimen sliding scale   SubCutaneous every 6 hours  methimazole 20 milliGRAM(s) Oral every 8 hours    ========================INFECTIOUS DISEASE================  Temp 99.5F, WBC downtrending 25.00 -> 18.35  Continue trending WBC and monitoring fever curve   CT C/A/P : progressive ISABELLE opacities and L consolidations, multifocal pneumonia  SCx  +Enterobacter cloacae complex, repeat BCx sent yesterday with GNR, Cefepime switched to Meropenem this AM  Aso started Vancomycin PO solution for C.diff prophylaxis.     meropenem  IVPB 1000 milliGRAM(s) IV Intermittent every 8 hours  vancomycin    Solution 125 milliGRAM(s) Oral every 12 hours      Patient requires continuous monitoring with bedside rhythm monitoring, pulse ox monitoring, and intermittent blood gas analysis. Care plan discussed with ICU care team. Patient remained critical and at risk for life threatening decompensation.     By signing my name below, I, Aparna Saleh, attest that this documentation has been prepared under the direction and in the presence of CLAUDIA Goldstein   Electronically signed: Cesia Villegas, 10-02-21 @ 08:28    I, Cristóbal Cisneros, personally performed the services described in this documentation. all medical record entries made by the scribe were at my direction and in my presence. I have reviewed the chart and agree that the record reflects my personal performance and is accurate and complete  Electronically signed: CLAUDIA Goldstein        RICKY HAMPTON  MRN-[id number]  Patient is a 66y old  Male who presents with a chief complaint of Anemia, Supratherapeutic INR, Dark Stools (01 Oct 2021 20:00)    HPI:  65M PMH ACC/AHA stage D HF due to NICM HM2 LVAD , TV annuloplasty ring 17 as destination therapy due to severe peripheral artery disease with significant stenosis  SIADH, Depression, CKD-3 with hyperkalemia, past E. coli UTIs, driveline drainage (21) and COVID-19 (back in 2020)  He was recently seen in clinic where he complained of abdominal pain and dark stools w constipation back in May. He presents to [facility] ER today weakness and fatigue, moderate and + Black stools for three days, on coumadin secondary to warfarin use in the setting of an LVAD. Patient has required transfusions for GIB in the past. Mostly recently back in 2021 pt had anemia with dark stools. No interventions was done at that time. However Last Endoscopy was done in 2020 (negative). Today labs show patient is anemic with H/H of 4.5/16.3,. INR is 8.84 MAP in the 90s, Temp 35.1. He denies any chest pain, shortness of breath, dizziness, abd pain, nausea or vomiting.       (2021 16:57)      Surgery/Hospital Course:   admit for melena w/ anemia, INR 8.84   6/15 Capsul study (+) for small bowel bleed, balloon endoscopy (old blood in prox ileum); post EGD - septic w/ L opacity, re-intubated for concern for aspiration, TTE (Mod MR, decrease biV w/ interventricular septum boweing towards R)   bronch    +C Diff    CT C/A/P: Fluid filled colon which may be 2/2 rapid transit. Small bilateral pleffs with associates. Compressive atelectasis New ISABELLE & LLL  parenchymal opacities, suspicious for pneumonia. 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    #8 Shiley trach at bedside    LVAD speed increased to 9200   Bronch   TC since . Patient transferred to SDU.    INR today 2.64.  H&H 7.3/24 this AM.  Will repeat CBC at noon, and will send stool guaiac Patient with persistent abdominal tenderness, rate of tube feeds decreased.  No nausea/vomiting.     INR today 2.4. H&H 9.1/28.6 low flow overnight /N&V, refusing Tube feeds on D5 1/2 normal  @50 cc/hr   INR 2.69  H&H 7.7/.1 refusing Tube feeds on D5 1/2 normal  @50 cc/hr. This am + BM Melena Dr Oneill HF  aware- PRBC x1  GI team consulted -  NPO  plan on study in am-  D/w Dr Cadet Patient  to return to CTU for further management; 1PRBCS    Post op INR 2.2 today.  No bleeding. BC + for SM.  Pt is hypotensive requiring pressor and inotropic support.  ID follow up today on Cefepime will follow.   R PTC for PTX    CT C/A/P: sub q emphysema in R chest wall, GGO RUL, small ascites CTH negative; Abd US: GB thickening, pericholecystic fluid     Perchole drain in place continues to drain total output overnight 133.  Fever today 38.8    duplex LE negative    Patient with persistent abdominal pain, refusing tube feeds and medications, Psych consulted   CTA A/P ordered to r/o mesenteric ischemia 2/2 persistent anorexia, nausea, vomiting. Revealed:  Evaluation of the mesenteric vessels is limited by streak artifact from LVAD. There appears to be severe stenosis of the proximal SMA; abdominal mesenteric doppler is recommended for further evaluation. 2.  No small bowel findings to suggest acute mesenteric ischemia. 3.  Focal dissections involving the right and left common iliac arteries.  8/15: Cultures resulted BC 1/2 +GPC in clusters, SC enterobacter; mesenteric duplex: borderline stenosis of proximal SMA  : CT C/A/P noncon: Nondular opacities in R lung apex w/cavitation, abd nl  :  Continue current care, treatment of thyrotoxicosis with medications as per endocrine, d/c ABX as per team.    RUQ sono: Contracted gallbladder with cholecystostomy tube in place.  9/10 failed SMA stent, L fem PSA, s/p 3U PRCB   CTA Abd/Pelvis L RP hematoma    Trach decannulated    intubated fro resp distress for increased WOB, LL pneumonia; CT C/A/P: progressive ISABELLE opacities and L consolidations, multifocal pneumonia    TTE (EF 25%, decreased RV, mod MR)     Today:  - Precedex weaned down so he can wake up more, still agrees to tracheostomy  - Hemodynamics stable, off of Vasopressin  - CPAP  since 5PM, saturating well and comfortable  - Uptitrating tube feeds as tolerated, no vomiting  - BC continue to grow Serratia/GNR, waiting for sensitivities to come back, continuing Meropenem for now    REVIEW OF SYSTEMS:  Unable to obtain, pt intubated and sedated     ICU Vital Signs Last 24 Hrs  T(C): 37.5 (02 Oct 2021 08:00), Max: 38.1 (01 Oct 2021 18:00)  T(F): 99.5 (02 Oct 2021 08:00), Max: 100.6 (01 Oct 2021 18:00)  HR: 73 (02 Oct 2021 08:00) (66 - 87)  BP: --  BP(mean): --  ABP: 122/73 (02 Oct 2021 08:00) (63/54 - 166/165)  ABP(mean): 90 (02 Oct 2021 08:00) (57 - 166)  RR: 20 (02 Oct 2021 08:00) (20 - 20)  SpO2: 100% (02 Oct 2021 08:00) (100% - 100%)      Physical Exam:  Gen:  intubated, NAD  CNS: moves all extremities to command on low dose sedation   Neck: no JVD, gauze over trach wound  RES : coarse breath sounds, no wheezing    CVS: +LVAD hum   Abd: Soft. Sybil drain with bilious fluid. Positive BS throughout. Mild RUQ abdominal tenderness by perc sybil.  : Landrum intact draining urine without hematuria.  Skin: No rash, erythema, cyanosis.  Vasc: Warm and well-perfused.  Ext:  no edema    ============================I/O===========================   I&O's Detail    01 Oct 2021 07:01  -  02 Oct 2021 07:00  --------------------------------------------------------  IN:    Albumin 5%  - 250 mL: 250 mL    Dexmedetomidine: 554.4 mL    Enteral Tube Flush: 250 mL    IV PiggyBack: 100 mL    Miscellaneous Tube Feedin mL    sodium chloride 0.9%: 240 mL    Vasopressin: 19 mL  Total IN: 2063.4 mL    OUT:    Drain (mL): 200 mL    Indwelling Catheter - Urethral (mL): 2370 mL  Total OUT: 2570 mL    Total NET: -506.6 mL      02 Oct 2021 07:01  -  02 Oct 2021 08:28  --------------------------------------------------------  IN:    Dexmedetomidine: 23.1 mL    Miscellaneous Tube Feedin mL    sodium chloride 0.9%: 10 mL  Total IN: 63.1 mL    OUT:    Indwelling Catheter - Urethral (mL): 120 mL    Vasopressin: 0 mL  Total OUT: 120 mL    Total NET: -56.9 mL        ============================ LABS =========================                        8.6    18.35 )-----------( 45       ( 02 Oct 2021 00:34 )             27.5     10-02    142  |  110<H>  |  18  ----------------------------<  187<H>  4.0   |  20<L>  |  0.47<L>    Ca    9.9      02 Oct 2021 00:34  Phos  2.5     10-02  Mg     1.7     10-02    TPro  6.8  /  Alb  3.3  /  TBili  2.1<H>  /  DBili  x   /  AST  14  /  ALT  17  /  AlkPhos  152<H>  10-02    LIVER FUNCTIONS - ( 02 Oct 2021 00:34 )  Alb: 3.3 g/dL / Pro: 6.8 g/dL / ALK PHOS: 152 U/L / ALT: 17 U/L / AST: 14 U/L / GGT: x             ABG - ( 02 Oct 2021 00:19 )  pH, Arterial: 7.45  pH, Blood: x     /  pCO2: 32    /  pO2: 184   / HCO3: 22    / Base Excess: -1.4  /  SaO2: 99.6                ======================Micro/Rad/Cardio=================  Culture: Reviewed   CXR: Reviewed  Echo:Reviewed  ======================================================  PAST MEDICAL & SURGICAL HISTORY:  CHF (congestive heart failure)    CAD (coronary artery disease)    Depression    Pleural effusion    History of 2019 novel coronavirus disease (COVID-19)  2020    Hemorrhoids    Bleeding hemorrhoids    Peripheral arterial disease    Claudication    BPH with urinary obstruction    ACC/AHA stage D systolic heart failure    Anticoagulation goal of INR 2.0 to 2.5    Falls    Clavicle fracture    CKD (chronic kidney disease), stage III    Iron deficiency anemia    H/O epistaxis    Vertigo    GI bleed    S/P TVR (tricuspid valve repair)    S/P ventricular assist device    S/P endoscopy      ====================ASSESSMENT ==============  Stage D Nonischemic Cardiomyopathy, Status Post HM2 on 2017    Cardiogenic shock  Hemodynamic instability   Acute hypoxemic respiratory failure s/p trach , decannulated on ; Reintubated on    GI bleed , Status Post Enteroscopy   Anemia, in setting of melena   Chronic Kidney Disease  Stress hyperglycemia   C.diff positive on    Hypovolemic shock  Septic shock  Leukocytosis  GB thickening/percholecystic s/p perc choley by IT   SMA stenosis  Serratia/citrobacter pneumonia   Stenotrophomonas pneumonia   Enterobacter pneumonia   Nasuea/vomiting  Deconditioning      Plan:  ====================== NEUROLOGY=====================  Sedated with IV Precedex for vent synchrony, wean as tolerated  Tylenol PRN for analgesia     acetaminophen    Suspension .. 650 milliGRAM(s) Enteral Tube every 6 hours PRN Mild Pain (1 - 3)  dexMEDEtomidine Infusion 1.5 MICROgram(s)/kG/Hr (23.1 mL/Hr) IV Continuous <Continuous>    ==================== RESPIRATORY======================  Acute hypoxemic respiratory failure s/p #8 shiley trach on ; decannulated on ; Reintubated on    Continue close monitoring of respiratory rate and breathing pattern, following of ABG’s with A-line monitoring, continuous pulse oximetry monitoring.   Scheduled for re-trach on Monday 10/4 with ENT  CPAP as tolerated      Mechanical Ventilation:  Mode: AC/ CMV (Assist Control/ Continuous Mandatory Ventilation)  RR (machine): 20  TV (machine): 500  FiO2: 40  PEEP: 5  ITime: 1  MAP: 10  PIP: 23      ====================CARDIOVASCULAR==================  Stage D Nonischemic Cardiomyopathy, Status Post HM2 on 2017; LVAD settings 9200, flow 4.9  TTE : EF 12%, mild-mod AR, mild-mod TR, severe global LV systolic dysfxn, RV enlargement with decreased fxn, no obvious vegetations documented  Off of Vasopressin, monitor MAPs      ===================HEMATOLOGIC/ONC ===================  CTA A/P on  +L RP hematoma   Acute blood loss anemia, monitor H&H/Plts    Platelets downtrending thrombocytopenia, possible in setting of sepsis   Holding coumadin and ASA given recurrent GI bleeding, continue monitoring LDH     ===================== RENAL =========================  Continue monitoring urine output, I&OS, BUN/Cr   Euvolemic, even fluid balance, currently off diuretics.    Replete lytes PRN. Keep K> 4 and Mg >2    ==================== GASTROINTESTINAL===================  Tolerating tube feeds, Vital 1.5 shantelle @ 40cc/hr, increase as tolerated   CTA A/P : Evaluation of the mesenteric vessels is limited by streak artifact from LVAD. There appears to be severe stenosis of the proximal SMA; abdominal mesenteric doppler is recommended for further evaluation. 2.  No small bowel findings to suggest acute mesenteric ischemia. 3.  Focal dissections involving the right and left common iliac arteries.  Mesenteric duplex on 8/15 borderline stenosis of proximal SMA, s/p failed SMA stent, L fem RP hematoma on 9/10; No intervention for cholecystostomy tube/PEJ at this time   Reglan for gut motility      multivitamin 1 Tablet(s) Oral daily  GI prophylaxis, pantoprazole  Injectable 40 milliGRAM(s) IV Push every 12 hours  simethicone 80 milliGRAM(s) Chew every 8 hours PRN Gas  sodium chloride 0.9%. 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       Per endocrine      - c/w Methimazole, adjust based on labs        - Check Free T4 and total T3       - Propranolol dc'd yesterday in setting of Hypotension      insulin lispro (ADMELOG) corrective regimen sliding scale   SubCutaneous every 6 hours  methimazole 20 milliGRAM(s) Oral every 8 hours    ========================INFECTIOUS DISEASE================  Temp 99.5F, WBC downtrending 25.00 -> 18.35  Continue trending WBC and monitoring fever curve   CT C/A/P : progressive ISABELLE opacities and L consolidations, multifocal pneumonia  SCx  +Enterobacter cloacae complex, repeat BCx sent yesterday with GNR, Cefepime switched to Meropenem 10/1  BC continue to grow GNR, awaiting sensitivities  Also started Vancomycin PO solution for C.diff prophylaxis.     meropenem  IVPB 1000 milliGRAM(s) IV Intermittent every 8 hours  vancomycin    Solution 125 milliGRAM(s) Oral every 12 hours      Patient requires continuous monitoring with bedside rhythm monitoring, pulse ox monitoring, and intermittent blood gas analysis. Care plan discussed with ICU care team. Patient remained critical and at risk for life threatening decompensation.     By signing my name below, I, Aparna Saleh, attest that this documentation has been prepared under the direction and in the presence of CLAUDIA Goldstein   Electronically signed: Cesia Villegas, 10-02-21 @ 08:28    I, Cristóbal Cisneros, personally performed the services described in this documentation. all medical record entries made by the scribe were at my direction and in my presence. I have reviewed the chart and agree that the record reflects my personal performance and is accurate and complete  Electronically signed: CLAUDIA Goldstein

## 2021-10-03 NOTE — PROGRESS NOTE ADULT - SUBJECTIVE AND OBJECTIVE BOX
RICKY JOINT  MRN#: [id number]  Subjective:  Pulmonary progress  : recurrent Acute hypoxic respiratory Failure ,aspiration pneumonia, NICM  ,   65M PMH ACC/AHA stage D HF due to NICM HM2 LVAD , TV annuloplasty ring 17 as destination therapy due to severe peripheral artery disease with significant stenosis  SIADH, Depression, CKD-3 with hyperkalemia, past E. coli UTIs, driveline drainage (21) and COVID-19 (back in 2020)  He was recently seen in clinic where he complained of abdominal pain and dark stools w constipation back in May. He presents to [facility] ER today weakness and fatigue, moderate and + Black stools for three days, on coumadin secondary to warfarin use in the setting of an LVAD. Patient has required transfusions for GIB in the past. Mostly recently back in 2021 pt had anemia with dark stools. No interventions was done at that time. However Last Endoscopy was done in 2020 (negative). Today labs show patient is anemic with H/H of 4.5/16.3,. INR is 8.84 MAP in the 90s, Temp 35.1. He denies any chest pain, shortness of breath, dizziness, abd pain, nausea or vomiting. found to have  rectal bleeding underwent endoscopy ,old blood in the proximal ileum ,  develop sepsis with LL opacity given Antibiotics , Extubated , reintubated , Bronchoscopy on Zosyn for LL pneumonia  and Amiodarone S/P TV Annuloplasty , patient remain intubated on full ventilatory support .S/P multiple units of blood transfusion , remain on full ventilatory support on Precedex and propofol , new central IJ line , diarrhea C diff. +ve on po vancomycin and IV Flagyl,  mildly distended belly , fever start on cefepime 2gm q 8 hrs S/P tracheostomy .  new RT Subclavian central line continue on contact  isolation ,S/P  C-diff antibiotics, no more diarrhea back on full support mechanical ventilator , chest x ray show improvement in LLL air space disease, more awake and responsive on tube feeding no more diarrhea ,  no nausea or vomiting or diarrhea still very weak and tired , back on tube feeding ,still on po vancomycin , getting PT and OT at bed side ,   , no SOB getting stronger , improve muscle tone patient transfer to monitor bed still on contact isolation for C-Difficel colitis on 50% FI02, and change to Suresh  tube feeding still loose stool . H/H drop significantly require blood transfusion , most likely GI bleeding , IV heparin D/C ,  H/H is stable ., patient develop TR sided  pneumothorax require chest tube placement , RT IJ central line  placed , develop fever shaking chills , blood culture positive for serratia marcescens , start on cefepime .the patient  become hypoxic and hypotensive placed on full ventilatory support and Vasopressin , levophed and Dobutamine ,S/P blood transfusion on meropenem and vancomycin ,   , on and  off pressors , occasional agitation on Precedex .S/P IR cholecystostomy tube drainage placement in the RT upper Quadrant , resume anticoagulation chest x ray noted C-PAP trail lasted only for 2 hrs , new RT SC line and D/C RT IJ line , RT pig tail cathter has been removed , tolerating C-PAP trial placed on trach. collar 50% FI02 GI consultant noted on NG tube feeding as tolerated , develop AF RVR S/P  bolus Amiodarone  back to regular sinus Rhythm , Flat Affect depressed , back on tube feeding Vital AF at 60 cc/ hr .still intermittent abdominal pain , no fever saturation is accepted  back on full ventilatory support ,   on methimazole for hyperthyroidism ,  condition is the same ,still C/O Abdominal pain , white count is improving , no chest pain or SOB ,  .placed on Reglan 10 mg TID for gastric motility , depressed , withdrawn. , S/P  mesenteric angiogram , unable to stent SMA S/P 3 units of blood transfusion   RT IJ in place reassessed for stent in the SMA by vascular,  dark stool stable H/H ,surgical opinion for possible Lap cholecystectomy. over night events noted develop respiratory distress, , rectal bleeding, require intubation placed on full ventilatory support , FI02 is 50% also became hemianosmically unstable require triple pressor support with levophed , vasopressin and norsynephrine, pan culture placed  on Vancomycin IV and PO  and Zosyn, sedated with propofol kept NPO , new central line RT and left IJ cathter placed . Diflucan Added .fever of 38.5 weaned off  vasopressin , all culture are negative, resume tube feeding ,  white count is improving , on meropenem, vancomycin solution  , on Precedex for tracheostomy in AM      (2021 16:57)    PAST MEDICAL & SURGICAL HISTORY:  CHF (congestive heart failure)    CAD (coronary artery disease)  Depression    Pleural effusion    History of 2019 novel coronavirus disease (COVID-19)  2020    Hemorrhoids    Bleeding hemorrhoids    Peripheral arterial disease    Claudication    BPH with urinary obstruction    ACC/AHA stage D systolic heart failure  Anticoagulation goal of INR 2.0 to 2.5    Falls    Clavicle fracture    CKD (chronic kidney disease), stage III    Iron deficiency anemia    H/O epistaxis    Vertigo    GI bleed    S/P TVR (tricuspid valve repair)    S/P ventricular assist device    S/P endoscopy    OBJECTIVE:  ICU Vital Signs Last 24 Hrs  T(C): 38.2 (2021 10:00), Max: 38.5 (2021 12:00)  T(F): 100.8 (2021 10:00), Max: 101.3 (2021 12:00)  HR: 65 (2021 10:00) (61 - 69)  BP: --  BP(mean): --  ABP: 105/67 (2021 10:00) (90/54 - 113/64)  ABP(mean): 77 (2021 10:00) (63 - 77)  RR: 20 (2021 10:00) (19 - 35)  SpO2: 99% (2021 10:00) (96% - 100%)       @ : @ 07:00  --------------------------------------------------------  IN: 2693.9 mL / OUT: 1415 mL / NET: 1278.9 mL     @ 07: @ 10:49  --------------------------------------------------------  IN: 420.8 mL / OUT: 115 mL / NET: 305.8 mL  PHYSICAL EXAM: Daily   Elderly male intubated on full ventilatory support FI02 is 40%  support off  vasopressin , Precedex   Daily Weight in k.4 (2021 00:00)  HEENT:     + NCAT  + EOMI  - Conjuctival edema   - Icterus   - Thrush   - ETT  + NGT/OGT  Neck:         + FROM  RT IJ , LT IJ  lines JVD  - Nodes - Masses + Mid-line trachea - Tracheostomy  Chest:            normal A-P diameter    Lungs:          + CTA   + Rhonchi    - Rales    - Wheezing + Decreased  LT BS   - Dullness R L  Cardiac:       + S1 + S2    + RRR   - Irregular   - S3  - S4    - Murmurs   - Rub   - Hamman’s sign   Abdomen:    + BS  + Soft + Non-tender - Distended - Organomegaly - PEG .cholecystostomy tube in place  Extremities:   - Cyanosis U/L   - Clubbing  U/L  + LE/UE Edema   + Capillary refill    + Pulses   Neuro:        - Awake   -  Alert   - Confused   - Lethargic   + Sedated  + Generalized Weakness  Skin:        - Rashes    - Erythema   + Normal incisions   + IV sites intact          HOSPITAL MEDICATIONS: All medications reviewed and analyzed  MEDICATIONS  (STANDING):  amiodarone    Tablet 200 milliGRAM(s) Oral daily  chlorhexidine 0.12% Liquid 15 milliLiter(s) Oral Mucosa every 12 hours  chlorhexidine 2% Cloths 1 Application(s) Topical <User Schedule>  dexmedetomidine Infusion 0.5 MICROgram(s)/kG/Hr (9.81 mL/Hr) IV Continuous <Continuous>  dextrose 50% Injectable 50 milliLiter(s) IV Push every 15 minutes  heparin  Infusion 400 Unit(s)/Hr (12.5 mL/Hr) IV Continuous <Continuous>  Hydromorphone  Injectable 0.5 milliGRAM(s) IV Push once  insulin lispro (ADMELOG) corrective regimen sliding scale   SubCutaneous every 6 hours  pantoprazole  Injectable 40 milliGRAM(s) IV Push every 12 hours  piperacillin/tazobactam IVPB.. 3.375 Gram(s) IV Intermittent every 8 hours  propofol Infusion 20 MICROgram(s)/kG/Min (9.42 mL/Hr) IV Continuous <Continuous>  sodium chloride 0.9% lock flush 3 milliLiter(s) IV Push every 8 hours  sodium chloride 0.9%. 1000 milliLiter(s) (10 mL/Hr) IV Continuous <Continuous>    MEDICATIONS  (PRN):  acetaminophen    Suspension .. 650 milliGRAM(s) Oral every 6 hours PRN Temp greater or equal to 38C (100.4F)    LABS: All Lab data reviewed and analyzed                        9.4    19.50 )-----------( 57       ( 03 Oct 2021 01:10 )             30.7    10    135  |  103  |  15  ----------------------------<  179<H>  4.1   |  21<L>  |  0.35<L>    Ca    9.1      03 Oct 2021 14:17  Phos  2.5     10-  Mg     2.1     10    TPro  6.8  /  Alb  3.5  /  TBili  1.5<H>  /  DBili  x   /  AST  19  /  ALT  19  /  AlkPhos  178<H>  10-03      Ca    9.9      02 Oct 2021 00:34  Phos  2.5     10-  Mg     1.7     10-02    TPro  6.8  /  Alb  3.3  /  TBili  2.1<H>  /  DBili  x   /  AST  14  /  ALT  17  /  AlkPhos  152<H>  10-02    Ca    9.6      01 Oct 2021 01:25  Phos  3.5     10-  Mg     1.9     10-01    TPro  6.6  /  Alb  3.3  /  TBili  4.0<H>  /  DBili  x   /  AST  15  /  ALT  16  /  AlkPhos  150<H>  10-01                                                                                                                                                                  PTT - ( 2021 04:52 )  PTT:45.2 sec LIVER FUNCTIONS - ( 2021 00:42 )  Alb: 3.4 g/dL / Pro: 6.7 g/dL / ALK PHOS: 213 U/L / ALT: 15 U/L / AST: 24 U/L / GGT: x           RADIOLOGY: - Reviewed and analyzed RT Pig tail cathter  , LVAD HM2, CT scan of abdomen reviewed result noted

## 2021-10-03 NOTE — PROGRESS NOTE ADULT - SUBJECTIVE AND OBJECTIVE BOX
Preop Dx: Respiratory Failure  Surgeon: Dr Rodriguez  Procedure: Tracheostomy     Vital Signs Last 24 Hrs  T(C): 37.2 (03 Oct 2021 20:00), Max: 37.6 (03 Oct 2021 04:00)  T(F): 99 (03 Oct 2021 20:00), Max: 99.7 (03 Oct 2021 04:00)  HR: 72 (03 Oct 2021 20:00) (65 - 82)  BP: --  BP(mean): --  RR: 20 (03 Oct 2021 20:00) (19 - 33)  SpO2: 100% (03 Oct 2021 20:00) (95% - 100%)                        9.4    19.50 )-----------( 57       ( 03 Oct 2021 01:10 )             30.7     10-03    135  |  103  |  15  ----------------------------<  179<H>  4.1   |  21<L>  |  0.35<L>    Ca    9.1      03 Oct 2021 14:17  Phos  2.5     10-03  Mg     2.1     10-03    TPro  6.8  /  Alb  3.5  /  TBili  1.5<H>  /  DBili  x   /  AST  19  /  ALT  19  /  AlkPhos  178<H>  10-03    PT/INR - ( 03 Oct 2021 01:10 )   PT: 14.0 sec;   INR: 1.18 ratio         PTT - ( 03 Oct 2021 01:10 )  PTT:28.4 sec  Daily     Daily Weight in k.8 (03 Oct 2021 00:00)    EKG:    Ventricular Rate 104 BPM    Atrial Rate 104 BPM    P-R Interval 128 ms    QRS Duration 112 ms    Q-T Interval 334 ms    QTC Calculation(Bazett) 439 ms    P Axis 83 degrees    R Axis -82 degrees    T Axis 78 degrees    Diagnosis Line SINUS TACHYCARDIA  LEFT ATRIAL ENLARGEMENT  LEFT ANTERIOR FASCICULAR BLOCK  LATERAL INFARCT (CITED ON OR BEFORE 2021)  ABNORMAL ECG  WHEN COMPARED WITH ECG OF 10-SEP-2021 21:14,  NO SIGNIFICANT CHANGE WAS FOUND  Confirmed by FRANCISCO PERSON, KAROL ([id number]) on 2021 4:11:33 PM          CXR:     EXAM:  XR CHEST PORTABLE ROUTINE 1V                            PROCEDURE DATE:  10/01/2021            INTERPRETATION:  Chest one view    HISTORY: Postop    COMPARISON STUDY: 2021    Frontal expiratory view of the chest shows the heart to be similar in size. Endotracheal tube, feeding tube and left jugular line are present. Loop recorder, sternal wires, cardiac valve ring and LVAD device are again noted.    The lungs are clear and there is no evidence of pneumothorax nor definite pleural effusion.    IMPRESSION:  No active pulmonary disease.    Thank you for the courtesy of this referral.    --- End of Report ---                Type and Screen: O + (neg antibodies)    Plan:  - OR 10/04/21 for tracheostomy with Dr. Rodriguez  - NPO after midnight except meds  - IVF while NPO  - Consent pending  - Medical clearance for OR

## 2021-10-03 NOTE — CHART NOTE - NSCHARTNOTEFT_GEN_A_CORE
TFTs reviewed. FT4 downtrending to 3.2 from 5.2 and TT3 109 from 103.   Recommend decreasing Methimazole to 20 mg BID and repeating Free T4 and Total T3 on 10/6.     Sia Aceves MD  Endocrine Fellow  Pager: [phone] [phone]  Consults 9am-5pm: [phone]  After 5pm and weekends: [phone]

## 2021-10-03 NOTE — PROGRESS NOTE ADULT - SUBJECTIVE AND OBJECTIVE BOX
RICKY HAMPTON  MRN-[id number]  Patient is a 66y old  Male who presents with a chief complaint of Anemia, Supratherapeutic INR, Dark Stools (02 Oct 2021 12:55)    HPI:  65M PMH ACC/AHA stage D HF due to NICM HM2 LVAD , TV annuloplasty ring 17 as destination therapy due to severe peripheral artery disease with significant stenosis  SIADH, Depression, CKD-3 with hyperkalemia, past E. coli UTIs, driveline drainage (21) and COVID-19 (back in 2020)  He was recently seen in clinic where he complained of abdominal pain and dark stools w constipation back in May. He presents to [facility] ER today weakness and fatigue, moderate and + Black stools for three days, on coumadin secondary to warfarin use in the setting of an LVAD. Patient has required transfusions for GIB in the past. Mostly recently back in 2021 pt had anemia with dark stools. No interventions was done at that time. However Last Endoscopy was done in 2020 (negative). Today labs show patient is anemic with H/H of 4.5/16.3,. INR is 8.84 MAP in the 90s, Temp 35.1. He denies any chest pain, shortness of breath, dizziness, abd pain, nausea or vomiting.       (2021 16:57)      Surgery/Hospital Course:   admit for melena w/ anemia, INR 8.84   6/15 Capsul study (+) for small bowel bleed, balloon endoscopy (old blood in prox ileum); post EGD - septic w/ L opacity, re-intubated for concern for aspiration, TTE (Mod MR, decrease biV w/ interventricular septum boweing towards R)   bronch    +C Diff    CT C/A/P: Fluid filled colon which may be 2/2 rapid transit. Small bilateral pleffs with associates. Compressive atelectasis New ISABELLE & LLL  parenchymal opacities, suspicious for pneumonia. Moderate stenosis in the proximal superior mesenteric artery.    #8 Shiley trach at bedside    LVAD speed increased to 9200   Bronch   TC since . Patient transferred to SDU.    INR today 2.64.  H&H 7.3/24 this AM.  Will repeat CBC at noon, and will send stool guaiac Patient with persistent abdominal tenderness, rate of tube feeds decreased.  No nausea/vomiting.     INR today 2.4. H&H 9.1/28.6 low flow overnight /N&V, refusing Tube feeds on D5 1/2 normal  @50 cc/hr   INR 2.69  H&H 7.7/.1 refusing Tube feeds on D5 1/2 normal  @50 cc/hr. This am + BM Melena Dr Oneill HF  aware- PRBC x1  GI team consulted -  NPO  plan on study in am-  D/w Dr Cadet Patient  to return to CTU for further management; 1PRBCS    Post op INR 2.2 today.  No bleeding. BC + for SM.  Pt is hypotensive requiring pressor and inotropic support.  ID follow up today on Cefepime will follow.   R PTC for PTX    CT C/A/P: sub q emphysema in R chest wall, GGO RUL, small ascites CTH negative; Abd US: GB thickening, pericholecystic fluid     Perchole drain in place continues to drain total output overnight 133.  Fever today 38.8    duplex LE negative    Patient with persistent abdominal pain, refusing tube feeds and medications, Psych consulted   CTA A/P ordered to r/o mesenteric ischemia 2/2 persistent anorexia, nausea, vomiting. Revealed:  Evaluation of the mesenteric vessels is limited by streak artifact from LVAD. There appears to be severe stenosis of the proximal SMA; abdominal mesenteric doppler is recommended for further evaluation. 2.  No small bowel findings to suggest acute mesenteric ischemia. 3.  Focal dissections involving the right and left common iliac arteries.  8/15: Cultures resulted BC 1/2 +GPC in clusters, SC enterobacter; mesenteric duplex: borderline stenosis of proximal SMA  : CT C/A/P noncon: Nondular opacities in R lung apex w/cavitation, abd nl  :  Continue current care, treatment of thyrotoxicosis with medications as per endocrine, d/c ABX as per team.    RUQ sono: Contracted gallbladder with cholecystostomy tube in place.  9/10 failed SMA stent, L fem PSA, s/p 3U PRCB   CTA Abd/Pelvis L RP hematoma    Trach decannulated    intubated fro resp distress for increased WOB, LL pneumonia; CT C/A/P: progressive ISABELLE opacities and L consolidations, multifocal pneumonia    TTE (EF 25%, decreased RV, mod MR)     Today:    REVIEW OF SYSTEMS:  Unable to obtain, pt intubated and sedated     ICU Vital Signs Last 24 Hrs  T(C): 36.6 (03 Oct 2021 08:00), Max: 37.7 (02 Oct 2021 12:00)  T(F): 97.9 (03 Oct 2021 08:00), Max: 99.9 (02 Oct 2021 12:00)  HR: 67 (03 Oct 2021 08:00) (65 - 81)  BP: 130/87 (02 Oct 2021 13:00) (130/87 - 130/87)  BP(mean): 103 (02 Oct 2021 13:00) (103 - 103)  ABP: 80/65 (03 Oct 2021 08:00) (80/62 - 138/81)  ABP(mean): 72 (03 Oct 2021 08:00) (69 - 101)  RR: 20 (03 Oct 2021 08:00) (20 - 33)  SpO2: 100% (03 Oct 2021 08:00) (99% - 100%)      Physical Exam:  Gen:  intubated, NAD  CNS: moves all extremities to command on low dose sedation   Neck: no JVD, gauze over trach wound  RES : coarse breath sounds, no wheezing    CVS: +LVAD hum   Abd: Soft. Sybil drain with bilious fluid. Positive BS throughout. Mild RUQ abdominal tenderness by perc sybil.  : Landrum intact draining urine without hematuria.  Skin: No rash, erythema, cyanosis.  Vasc: Warm and well-perfused.  Ext:  no edema    ============================I/O===========================   I&O's Detail    02 Oct 2021 07:01  -  03 Oct 2021 07:00  --------------------------------------------------------  IN:    Dexmedetomidine: 542.1 mL    Enteral Tube Flush: 150 mL    IV PiggyBack: 50 mL    IV PiggyBack: 50 mL    Miscellaneous Tube Feedin mL    sodium chloride 0.9%: 240 mL  Total IN: 1902.1 mL    OUT:    Drain (mL): 250 mL    Indwelling Catheter - Urethral (mL): 1845 mL    Vasopressin: 0 mL  Total OUT: 2095 mL    Total NET: -192.9 mL      03 Oct 2021 07:01  -  03 Oct 2021 08:05  --------------------------------------------------------  IN:    Dexmedetomidine: 30.8 mL    Enteral Tube Flush: 10 mL    Miscellaneous Tube Feedin mL    sodium chloride 0.9%: 20 mL  Total IN: 140.8 mL    OUT:    Vasopressin: 0 mL  Total OUT: 0 mL    Total NET: 140.8 mL        ============================ LABS =========================                        9.4    19.50 )-----------( 57       ( 03 Oct 2021 01:10 )             30.7     10-    135  |  103  |  14  ----------------------------<  177<H>  3.9   |  21<L>  |  0.41<L>    Ca    9.3      03 Oct 2021 01:10  Phos  3.5     10-03  Mg     1.4     10-    TPro  6.9  /  Alb  3.2<L>  /  TBili  1.6<H>  /  DBili  x   /  AST  17  /  ALT  16  /  AlkPhos  186<H>  10-    LIVER FUNCTIONS - ( 03 Oct 2021 01:10 )  Alb: 3.2 g/dL / Pro: 6.9 g/dL / ALK PHOS: 186 U/L / ALT: 16 U/L / AST: 17 U/L / GGT: x           PT/INR - ( 03 Oct 2021 01:10 )   PT: 14.0 sec;   INR: 1.18 ratio         PTT - ( 03 Oct 2021 01:10 )  PTT:28.4 sec  ABG - ( 03 Oct 2021 06:16 )  pH, Arterial: 7.38  pH, Blood: x     /  pCO2: 41    /  pO2: 152   / HCO3: 24    / Base Excess: -0.8  /  SaO2: 100.0               ======================Micro/Rad/Cardio=================  Culture: Reviewed   CXR: Reviewed  Echo:Reviewed  ======================================================  PAST MEDICAL & SURGICAL HISTORY:  CHF (congestive heart failure)    CAD (coronary artery disease)    Depression    Pleural effusion    History of 2019 novel coronavirus disease (COVID-19)  2020    Hemorrhoids    Bleeding hemorrhoids    Peripheral arterial disease    Claudication    BPH with urinary obstruction    ACC/AHA stage D systolic heart failure    Anticoagulation goal of INR 2.0 to 2.5    Falls    Clavicle fracture    CKD (chronic kidney disease), stage III    Iron deficiency anemia    H/O epistaxis    Vertigo    GI bleed    S/P TVR (tricuspid valve repair)    S/P ventricular assist device    S/P endoscopy      ====================ASSESSMENT ==============  Stage D Nonischemic Cardiomyopathy, Status Post HM2 on 2017    Cardiogenic shock  Hemodynamic instability   Acute hypoxemic respiratory failure s/p trach , decannulated on ; Reintubated on    GI bleed , Status Post Enteroscopy   Anemia, in setting of melena   Chronic Kidney Disease  Stress hyperglycemia   C.diff positive on    Hypovolemic shock  Septic shock  Leukocytosis  GB thickening/percholecystic s/p perc choley by IT   SMA stenosis  Serratia/citrobacter pneumonia   Stenotrophomonas pneumonia   Enterobacter pneumonia   Nasuea/vomiting  Deconditioning    Plan:  ====================== NEUROLOGY=====================  Sedated with IV Precedex for vent synchrony, wean as tolerated  Tylenol PRN for analgesia    acetaminophen    Suspension .. 650 milliGRAM(s) Enteral Tube every 6 hours PRN Mild Pain (1 - 3)  dexMEDEtomidine Infusion 1.5 MICROgram(s)/kG/Hr (23.1 mL/Hr) IV Continuous <Continuous>  fentaNYL    Injectable 25 MICROGram(s) IV Push once    ==================== RESPIRATORY======================  Acute hypoxemic respiratory failure s/p #8 shiley trach on ; decannulated on ; Reintubated on    Continue close monitoring of respiratory rate and breathing pattern, following of ABG’s with A-line monitoring, continuous pulse oximetry monitoring.   Scheduled for re-trach on Monday 10/4 with ENT  CPAP as tolerated    Mechanical Ventilation:  Mode: AC/ CMV (Assist Control/ Continuous Mandatory Ventilation)  RR (machine): 20  TV (machine): 500  FiO2: 40  PEEP: 5  ITime: 1  MAP: 10  PIP: 22      ====================CARDIOVASCULAR==================  Stage D Nonischemic Cardiomyopathy, Status Post HM2 on 2017; LVAD settings 9200, flow 4.8  TTE : EF 12%, mild-mod AR, mild-mod TR, severe global LV systolic dysfxn, RV enlargement with decreased fxn, no obvious vegetations documented  monitor MAPs    vasopressin Infusion 0.067 Unit(s)/Min (4 mL/Hr) IV Continuous <Continuous>    ===================HEMATOLOGIC/ONC ===================  CTA A/P on  +L RP hematoma   Acute blood loss anemia, monitor H&H/Plts    Platelets downtrending thrombocytopenia, possible in setting of sepsis   Holding coumadin and ASA given recurrent GI bleeding, continue monitoring LDH     ===================== RENAL =========================  Continue monitoring urine output, I&OS, BUN/Cr   Euvolemic, even fluid balance, currently off diuretics.    Replete lytes PRN. Keep K> 4 and Mg >2    ==================== GASTROINTESTINAL===================  Tolerating tube feeds, Vital 1.5 shantelle @ 40cc/hr, increase as tolerated   CTA A/P : Evaluation of the mesenteric vessels is limited by streak artifact from LVAD. There appears to be severe stenosis of the proximal SMA; abdominal mesenteric doppler is recommended for further evaluation. 2.  No small bowel findings to suggest acute mesenteric ischemia. 3.  Focal dissections involving the right and left common iliac arteries.  Mesenteric duplex on 8/15 borderline stenosis of proximal SMA, s/p failed SMA stent, L fem RP hematoma on 9/10; No intervention for cholecystostomy tube/PEJ at this time   Reglan for gut motility    multivitamin 1 Tablet(s) Oral daily  GI prophylaxis, pantoprazole  Injectable 40 milliGRAM(s) IV Push every 12 hours  simethicone 80 milliGRAM(s) Chew every 8 hours PRN Gas  sodium chloride 0.9%. 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       Per endocrine      - c/w Methimazole, adjust based on labs        - Check Free T4 and total T3     insulin lispro (ADMELOG) corrective regimen sliding scale   SubCutaneous every 6 hours  methimazole 20 milliGRAM(s) Oral every 8 hours    ========================INFECTIOUS DISEASE================  Afebrile, WBC rising 18.35 -> 19.50  Continue trending WBC and monitoring fever curve   CT C/A/P : progressive ISABELLE opacities and L consolidations, multifocal pneumonia  SCx  +Enterobacter cloacae complex, repeat BCx sent yesterday with GNR, Cefepime switched to Meropenem 10/1  BC continue to grow GNR, awaiting sensitivities  Also started Vancomycin PO solution for C.diff prophylaxis.     meropenem  IVPB 1000 milliGRAM(s) IV Intermittent every 8 hours  vancomycin    Solution 125 milliGRAM(s) Oral every 12 hours      Patient requires continuous monitoring with bedside rhythm monitoring, pulse ox monitoring, and intermittent blood gas analysis. Care plan discussed with ICU care team. Patient remained critical and at risk for life threatening decompensation.     By signing my name below, I, Aparna Saleh, attest that this documentation has been prepared under the direction and in the presence of CLAUDIA Goldstein   Electronically signed: Cesia Villegas, 10-03-21 @ 08:05    I, Cristóbal Cisneros, personally performed the services described in this documentation. all medical record entries made by the scribe were at my direction and in my presence. I have reviewed the chart and agree that the record reflects my personal performance and is accurate and complete  Electronically signed: CLAUDIA Goldstein        RICKY HAMPTON  MRN-[id number]  Patient is a 66y old  Male who presents with a chief complaint of Anemia, Supratherapeutic INR, Dark Stools (02 Oct 2021 12:55)    HPI:  65M PMH ACC/AHA stage D HF due to NICM HM2 LVAD , TV annuloplasty ring 17 as destination therapy due to severe peripheral artery disease with significant stenosis  SIADH, Depression, CKD-3 with hyperkalemia, past E. coli UTIs, driveline drainage (21) and COVID-19 (back in 2020)  He was recently seen in clinic where he complained of abdominal pain and dark stools w constipation back in May. He presents to [facility] ER today weakness and fatigue, moderate and + Black stools for three days, on coumadin secondary to warfarin use in the setting of an LVAD. Patient has required transfusions for GIB in the past. Mostly recently back in 2021 pt had anemia with dark stools. No interventions was done at that time. However Last Endoscopy was done in 2020 (negative). Today labs show patient is anemic with H/H of 4.5/16.3,. INR is 8.84 MAP in the 90s, Temp 35.1. He denies any chest pain, shortness of breath, dizziness, abd pain, nausea or vomiting.       (2021 16:57)      Surgery/Hospital Course:   admit for melena w/ anemia, INR 8.84   6/15 Capsul study (+) for small bowel bleed, balloon endoscopy (old blood in prox ileum); post EGD - septic w/ L opacity, re-intubated for concern for aspiration, TTE (Mod MR, decrease biV w/ interventricular septum boweing towards R)   bronch    +C Diff    CT C/A/P: Fluid filled colon which may be 2/2 rapid transit. Small bilateral pleffs with associates. Compressive atelectasis New ISABELLE & LLL  parenchymal opacities, suspicious for pneumonia. Moderate stenosis in the proximal superior mesenteric artery.    #8 Shiley trach at bedside    LVAD speed increased to 9200   Bronch   TC since . Patient transferred to SDU.    INR today 2.64.  H&H 7.3/24 this AM.  Will repeat CBC at noon, and will send stool guaiac Patient with persistent abdominal tenderness, rate of tube feeds decreased.  No nausea/vomiting.     INR today 2.4. H&H 9.1/28.6 low flow overnight /N&V, refusing Tube feeds on D5 1/2 normal  @50 cc/hr   INR 2.69  H&H 7.7/.1 refusing Tube feeds on D5 1/2 normal  @50 cc/hr. This am + BM Melena Dr Oneill HF  aware- PRBC x1  GI team consulted -  NPO  plan on study in am-  D/w Dr Cadet Patient  to return to CTU for further management; 1PRBCS    Post op INR 2.2 today.  No bleeding. BC + for SM.  Pt is hypotensive requiring pressor and inotropic support.  ID follow up today on Cefepime will follow.   R PTC for PTX    CT C/A/P: sub q emphysema in R chest wall, GGO RUL, small ascites CTH negative; Abd US: GB thickening, pericholecystic fluid     Perchole drain in place continues to drain total output overnight 133.  Fever today 38.8    duplex LE negative    Patient with persistent abdominal pain, refusing tube feeds and medications, Psych consulted   CTA A/P ordered to r/o mesenteric ischemia 2/2 persistent anorexia, nausea, vomiting. Revealed:  Evaluation of the mesenteric vessels is limited by streak artifact from LVAD. There appears to be severe stenosis of the proximal SMA; abdominal mesenteric doppler is recommended for further evaluation. 2.  No small bowel findings to suggest acute mesenteric ischemia. 3.  Focal dissections involving the right and left common iliac arteries.  8/15: Cultures resulted BC 1/2 +GPC in clusters, SC enterobacter; mesenteric duplex: borderline stenosis of proximal SMA  : CT C/A/P noncon: Nondular opacities in R lung apex w/cavitation, abd nl  :  Continue current care, treatment of thyrotoxicosis with medications as per endocrine, d/c ABX as per team.    RUQ sono: Contracted gallbladder with cholecystostomy tube in place.  9/10 failed SMA stent, L fem PSA, s/p 3U PRCB   CTA Abd/Pelvis L RP hematoma    Trach decannulated    intubated fro resp distress for increased WOB, LL pneumonia; CT C/A/P: progressive ISABELLE opacities and L consolidations, multifocal pneumonia    TTE (EF 25%, decreased RV, mod MR)     Today:  - Plan for trach tomorrow with ENT   - Patient entered atrial tachycardia this AM  - Map drop low to mid 60s   - Flows remain stable   - Possible initiation of vasopressin for support if needed  - Low filling pressure patient is receiving albumin   - CPAP as tolerated   - Remains on meropenem for bacterium as still positive culture      REVIEW OF SYSTEMS:  Unable to obtain, pt intubated and sedated     ICU Vital Signs Last 24 Hrs  T(C): 36.6 (03 Oct 2021 08:00), Max: 37.7 (02 Oct 2021 12:00)  T(F): 97.9 (03 Oct 2021 08:00), Max: 99.9 (02 Oct 2021 12:00)  HR: 67 (03 Oct 2021 08:00) (65 - 81)  BP: 130/87 (02 Oct 2021 13:00) (130/87 - 130/87)  BP(mean): 103 (02 Oct 2021 13:00) (103 - 103)  ABP: 80/65 (03 Oct 2021 08:00) (80/62 - 138/81)  ABP(mean): 72 (03 Oct 2021 08:00) (69 - 101)  RR: 20 (03 Oct 2021 08:00) (20 - 33)  SpO2: 100% (03 Oct 2021 08:00) (99% - 100%)      Physical Exam:  Gen:  intubated, NAD  CNS: moves all extremities to command on low dose sedation   Neck: no JVD, gauze over trach wound  RES : coarse breath sounds, no wheezing    CVS: +LVAD hum   Abd: Soft. Sybil drain with bilious fluid. Positive BS throughout. Mild RUQ abdominal tenderness by perc sybil.  : Landrum intact draining urine without hematuria.  Skin: No rash, erythema, cyanosis.  Vasc: Warm and well-perfused.  Ext:  no edema    ============================I/O===========================   I&O's Detail    02 Oct 2021 07:01  -  03 Oct 2021 07:00  --------------------------------------------------------  IN:    Dexmedetomidine: 542.1 mL    Enteral Tube Flush: 150 mL    IV PiggyBack: 50 mL    IV PiggyBack: 50 mL    Miscellaneous Tube Feedin mL    sodium chloride 0.9%: 240 mL  Total IN: 1902.1 mL    OUT:    Drain (mL): 250 mL    Indwelling Catheter - Urethral (mL): 1845 mL    Vasopressin: 0 mL  Total OUT: 2095 mL    Total NET: -192.9 mL      03 Oct 2021 07:01  -  03 Oct 2021 08:05  --------------------------------------------------------  IN:    Dexmedetomidine: 30.8 mL    Enteral Tube Flush: 10 mL    Miscellaneous Tube Feedin mL    sodium chloride 0.9%: 20 mL  Total IN: 140.8 mL    OUT:    Vasopressin: 0 mL  Total OUT: 0 mL    Total NET: 140.8 mL        ============================ LABS =========================                        9.4    19.50 )-----------( 57       ( 03 Oct 2021 01:10 )             30.7     10-03    135  |  103  |  14  ----------------------------<  177<H>  3.9   |  21<L>  |  0.41<L>    Ca    9.3      03 Oct 2021 01:10  Phos  3.5     10-03  Mg     1.4     10-03    TPro  6.9  /  Alb  3.2<L>  /  TBili  1.6<H>  /  DBili  x   /  AST  17  /  ALT  16  /  AlkPhos  186<H>  10-03    LIVER FUNCTIONS - ( 03 Oct 2021 01:10 )  Alb: 3.2 g/dL / Pro: 6.9 g/dL / ALK PHOS: 186 U/L / ALT: 16 U/L / AST: 17 U/L / GGT: x           PT/INR - ( 03 Oct 2021 01:10 )   PT: 14.0 sec;   INR: 1.18 ratio         PTT - ( 03 Oct 2021 01:10 )  PTT:28.4 sec  ABG - ( 03 Oct 2021 06:16 )  pH, Arterial: 7.38  pH, Blood: x     /  pCO2: 41    /  pO2: 152   / HCO3: 24    / Base Excess: -0.8  /  SaO2: 100.0               ======================Micro/Rad/Cardio=================  Culture: Reviewed   CXR: Reviewed  Echo:Reviewed  ======================================================  PAST MEDICAL & SURGICAL HISTORY:  CHF (congestive heart failure)    CAD (coronary artery disease)    Depression    Pleural effusion    History of 2019 novel coronavirus disease (COVID-19)  2020    Hemorrhoids    Bleeding hemorrhoids    Peripheral arterial disease    Claudication    BPH with urinary obstruction    ACC/AHA stage D systolic heart failure    Anticoagulation goal of INR 2.0 to 2.5    Falls    Clavicle fracture    CKD (chronic kidney disease), stage III    Iron deficiency anemia    H/O epistaxis    Vertigo    GI bleed    S/P TVR (tricuspid valve repair)    S/P ventricular assist device    S/P endoscopy      ====================ASSESSMENT ==============  Stage D Nonischemic Cardiomyopathy, Status Post HM2 on 2017    Cardiogenic shock  Hemodynamic instability   Acute hypoxemic respiratory failure s/p trach , decannulated on ; Reintubated on    GI bleed , Status Post Enteroscopy   Anemia, in setting of melena   Chronic Kidney Disease  Stress hyperglycemia   C.diff positive on    Hypovolemic shock  Septic shock  Leukocytosis  GB thickening/percholecystic s/p perc choley by IT   SMA stenosis  Serratia/citrobacter pneumonia   Stenotrophomonas pneumonia   Enterobacter pneumonia   Nasuea/vomiting  Deconditioning    Plan:  ====================== NEUROLOGY=====================  Sedated with IV Precedex for vent synchrony, wean as tolerated  Tylenol PRN for analgesia    acetaminophen    Suspension .. 650 milliGRAM(s) Enteral Tube every 6 hours PRN Mild Pain (1 - 3)  dexMEDEtomidine Infusion 1.5 MICROgram(s)/kG/Hr (23.1 mL/Hr) IV Continuous <Continuous>  fentaNYL    Injectable 25 MICROGram(s) IV Push once    ==================== RESPIRATORY======================  Acute hypoxemic respiratory failure s/p #8 shiley trach on ; decannulated on ; Reintubated on    Continue close monitoring of respiratory rate and breathing pattern, following of ABG’s with A-line monitoring, continuous pulse oximetry monitoring.   Scheduled for re-trach on Monday 10/4 with ENT  CPAP as tolerated    Mechanical Ventilation:  Mode: AC/ CMV (Assist Control/ Continuous Mandatory Ventilation)  RR (machine): 20  TV (machine): 500  FiO2: 40  PEEP: 5  ITime: 1  MAP: 10  PIP: 22      ====================CARDIOVASCULAR==================  Stage D Nonischemic Cardiomyopathy, Status Post HM2 on 2017; LVAD settings 9200, flow 4.8  TTE : EF 12%, mild-mod AR, mild-mod TR, severe global LV systolic dysfxn, RV enlargement with decreased fxn, no obvious vegetations documented  monitor MAPs    vasopressin Infusion 0.067 Unit(s)/Min (4 mL/Hr) IV Continuous <Continuous>    ===================HEMATOLOGIC/ONC ===================  CTA A/P on  +L RP hematoma   Acute blood loss anemia, monitor H&H/Plts    Platelets downtrending thrombocytopenia, possible in setting of sepsis   Holding coumadin and ASA given recurrent GI bleeding, continue monitoring LDH     ===================== RENAL =========================  Continue monitoring urine output, I&OS, BUN/Cr   Euvolemic, even fluid balance, currently off diuretics.    Replete lytes PRN. Keep K> 4 and Mg >2    ==================== GASTROINTESTINAL===================  Tolerating tube feeds, Vital 1.5 shantelle @ 40cc/hr, increase as tolerated   CTA A/P : Evaluation of the mesenteric vessels is limited by streak artifact from LVAD. There appears to be severe stenosis of the proximal SMA; abdominal mesenteric doppler is recommended for further evaluation. 2.  No small bowel findings to suggest acute mesenteric ischemia. 3.  Focal dissections involving the right and left common iliac arteries.  Mesenteric duplex on 8/15 borderline stenosis of proximal SMA, s/p failed SMA stent, L fem RP hematoma on 9/10; No intervention for cholecystostomy tube/PEJ at this time   Reglan for gut motility    multivitamin 1 Tablet(s) Oral daily  GI prophylaxis, pantoprazole  Injectable 40 milliGRAM(s) IV Push every 12 hours  simethicone 80 milliGRAM(s) Chew every 8 hours PRN Gas  sodium chloride 0.9%. 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       Per endocrine      - c/w Methimazole, adjust based on labs        - Check Free T4 and total T3     insulin lispro (ADMELOG) corrective regimen sliding scale   SubCutaneous every 6 hours  methimazole 20 milliGRAM(s) Oral every 8 hours    ========================INFECTIOUS DISEASE================  Afebrile, WBC rising 18.35 -> 19.50  Continue trending WBC and monitoring fever curve   CT C/A/P : progressive ISABELLE opacities and L consolidations, multifocal pneumonia  SCx  +Enterobacter cloacae complex, repeat BCx sent yesterday with GNR, Cefepime switched to Meropenem 10/1  BC continue to grow GNR, awaiting sensitivities  Also started Vancomycin PO solution for C.diff prophylaxis.     meropenem  IVPB 1000 milliGRAM(s) IV Intermittent every 8 hours  vancomycin    Solution 125 milliGRAM(s) Oral every 12 hours      Patient requires continuous monitoring with bedside rhythm monitoring, pulse ox monitoring, and intermittent blood gas analysis. Care plan discussed with ICU care team. Patient remained critical and at risk for life threatening decompensation.     By signing my name below, I, Aparna Saleh, attest that this documentation has been prepared under the direction and in the presence of CLAUDIA Goldstein   Electronically signed: Cesia Villegas, 10-03-21 @ 08:05    I, Cristóbal Cisneros, personally performed the services described in this documentation. all medical record entries made by the scribe were at my direction and in my presence. I have reviewed the chart and agree that the record reflects my personal performance and is accurate and complete  Electronically signed: CLAUDIA Goldstein        RICKY HAMPTON  MRN-[id number]  Patient is a 66y old  Male who presents with a chief complaint of Anemia, Supratherapeutic INR, Dark Stools (02 Oct 2021 12:55)    HPI:  65M PMH ACC/AHA stage D HF due to NICM HM2 LVAD , TV annuloplasty ring 17 as destination therapy due to severe peripheral artery disease with significant stenosis  SIADH, Depression, CKD-3 with hyperkalemia, past E. coli UTIs, driveline drainage (21) and COVID-19 (back in 2020)  He was recently seen in clinic where he complained of abdominal pain and dark stools w constipation back in May. He presents to [facility] ER today weakness and fatigue, moderate and + Black stools for three days, on coumadin secondary to warfarin use in the setting of an LVAD. Patient has required transfusions for GIB in the past. Mostly recently back in 2021 pt had anemia with dark stools. No interventions was done at that time. However Last Endoscopy was done in 2020 (negative). Today labs show patient is anemic with H/H of 4.5/16.3,. INR is 8.84 MAP in the 90s, Temp 35.1. He denies any chest pain, shortness of breath, dizziness, abd pain, nausea or vomiting.       (2021 16:57)      Surgery/Hospital Course:   admit for melena w/ anemia, INR 8.84   6/15 Capsul study (+) for small bowel bleed, balloon endoscopy (old blood in prox ileum); post EGD - septic w/ L opacity, re-intubated for concern for aspiration, TTE (Mod MR, decrease biV w/ interventricular septum boweing towards R)   bronch    +C Diff    CT C/A/P: Fluid filled colon which may be 2/2 rapid transit. Small bilateral pleffs with associates. Compressive atelectasis New ISABELLE & LLL  parenchymal opacities, suspicious for pneumonia. Moderate stenosis in the proximal superior mesenteric artery.    #8 Shiley trach at bedside    LVAD speed increased to 9200   Bronch   TC since . Patient transferred to SDU.    INR today 2.64.  H&H 7.3/24 this AM.  Will repeat CBC at noon, and will send stool guaiac Patient with persistent abdominal tenderness, rate of tube feeds decreased.  No nausea/vomiting.     INR today 2.4. H&H 9.1/28.6 low flow overnight /N&V, refusing Tube feeds on D5 1/2 normal  @50 cc/hr   INR 2.69  H&H 7.7/.1 refusing Tube feeds on D5 1/2 normal  @50 cc/hr. This am + BM Melena Dr Oneill HF  aware- PRBC x1  GI team consulted -  NPO  plan on study in am-  D/w Dr Cadet Patient  to return to CTU for further management; 1PRBCS    Post op INR 2.2 today.  No bleeding. BC + for SM.  Pt is hypotensive requiring pressor and inotropic support.  ID follow up today on Cefepime will follow.   R PTC for PTX    CT C/A/P: sub q emphysema in R chest wall, GGO RUL, small ascites CTH negative; Abd US: GB thickening, pericholecystic fluid     Perchole drain in place continues to drain total output overnight 133.  Fever today 38.8    duplex LE negative    Patient with persistent abdominal pain, refusing tube feeds and medications, Psych consulted   CTA A/P ordered to r/o mesenteric ischemia 2/2 persistent anorexia, nausea, vomiting. Revealed:  Evaluation of the mesenteric vessels is limited by streak artifact from LVAD. There appears to be severe stenosis of the proximal SMA; abdominal mesenteric doppler is recommended for further evaluation. 2.  No small bowel findings to suggest acute mesenteric ischemia. 3.  Focal dissections involving the right and left common iliac arteries.  8/15: Cultures resulted BC 1/2 +GPC in clusters, SC enterobacter; mesenteric duplex: borderline stenosis of proximal SMA  : CT C/A/P noncon: Nondular opacities in R lung apex w/cavitation, abd nl  :  Continue current care, treatment of thyrotoxicosis with medications as per endocrine, d/c ABX as per team.    RUQ sono: Contracted gallbladder with cholecystostomy tube in place.  9/10 failed SMA stent, L fem PSA, s/p 3U PRCB   CTA Abd/Pelvis L RP hematoma    Trach decannulated    intubated fro resp distress for increased WOB, LL pneumonia; CT C/A/P: progressive ISABELLE opacities and L consolidations, multifocal pneumonia    TTE (EF 25%, decreased RV, mod MR)     Today:  - Plan for trach tomorrow with ENT bedside @ 1PM with cardiac anesthesia  - Patient entered atrial tachycardia this AM with drop in MAP but no drop in LVAD flows, Given 2g of Mg with resolution, re-entered in the afternoon, supplemented with more Mg and K with resolution  - Possible initiation of vasopressin for support if needed  - Low filling pressure patient is receiving albumin, making good UOP  - CPAP as tolerated and suction PRN, patient has good amount of secretions  - Remains on meropenem for GNR bacteremia as still has positive cultures  - New BC x2 being drawn  - Sensitivities came back for Serratia in blood, is sensitive to Meropenem  - 1/2 BC from 10/1 grew Enterobacter cloacae, will f/u sensitivities      REVIEW OF SYSTEMS:  Unable to obtain, pt intubated and sedated     ICU Vital Signs Last 24 Hrs  T(C): 36.6 (03 Oct 2021 08:00), Max: 37.7 (02 Oct 2021 12:00)  T(F): 97.9 (03 Oct 2021 08:00), Max: 99.9 (02 Oct 2021 12:00)  HR: 67 (03 Oct 2021 08:00) (65 - 81)  BP: 130/87 (02 Oct 2021 13:00) (130/87 - 130/87)  BP(mean): 103 (02 Oct 2021 13:00) (103 - 103)  ABP: 80/65 (03 Oct 2021 08:00) (80/62 - 138/81)  ABP(mean): 72 (03 Oct 2021 08:00) (69 - 101)  RR: 20 (03 Oct 2021 08:00) (20 - 33)  SpO2: 100% (03 Oct 2021 08:00) (99% - 100%)      Physical Exam:  Gen:  intubated, NAD  CNS: moves all extremities to command on low dose sedation   Neck: no JVD, gauze over trach wound  RES : coarse breath sounds, no wheezing    CVS: +LVAD hum   Abd: Soft. Sybil drain with bilious fluid. Positive BS throughout. Mild RUQ abdominal tenderness by perc sybil.  : Landrum intact draining urine without hematuria.  Skin: No rash, erythema, cyanosis.  Vasc: Warm and well-perfused.  Ext:  no edema    ============================I/O===========================   I&O's Detail    02 Oct 2021 07:01  -  03 Oct 2021 07:00  --------------------------------------------------------  IN:    Dexmedetomidine: 542.1 mL    Enteral Tube Flush: 150 mL    IV PiggyBack: 50 mL    IV PiggyBack: 50 mL    Miscellaneous Tube Feedin mL    sodium chloride 0.9%: 240 mL  Total IN: 1902.1 mL    OUT:    Drain (mL): 250 mL    Indwelling Catheter - Urethral (mL): 1845 mL    Vasopressin: 0 mL  Total OUT: 2095 mL    Total NET: -192.9 mL      03 Oct 2021 07:01  -  03 Oct 2021 08:05  --------------------------------------------------------  IN:    Dexmedetomidine: 30.8 mL    Enteral Tube Flush: 10 mL    Miscellaneous Tube Feedin mL    sodium chloride 0.9%: 20 mL  Total IN: 140.8 mL    OUT:    Vasopressin: 0 mL  Total OUT: 0 mL    Total NET: 140.8 mL        ============================ LABS =========================                        9.4    19.50 )-----------( 57       ( 03 Oct 2021 01:10 )             30.7     10-03    135  |  103  |  14  ----------------------------<  177<H>  3.9   |  21<L>  |  0.41<L>    Ca    9.3      03 Oct 2021 01:10  Phos  3.5     10-03  Mg     1.4     10-03    TPro  6.9  /  Alb  3.2<L>  /  TBili  1.6<H>  /  DBili  x   /  AST  17  /  ALT  16  /  AlkPhos  186<H>  10-03    LIVER FUNCTIONS - ( 03 Oct 2021 01:10 )  Alb: 3.2 g/dL / Pro: 6.9 g/dL / ALK PHOS: 186 U/L / ALT: 16 U/L / AST: 17 U/L / GGT: x           PT/INR - ( 03 Oct 2021 01:10 )   PT: 14.0 sec;   INR: 1.18 ratio         PTT - ( 03 Oct 2021 01:10 )  PTT:28.4 sec  ABG - ( 03 Oct 2021 06:16 )  pH, Arterial: 7.38  pH, Blood: x     /  pCO2: 41    /  pO2: 152   / HCO3: 24    / Base Excess: -0.8  /  SaO2: 100.0               ======================Micro/Rad/Cardio=================  Culture: Reviewed   CXR: Reviewed  Echo:Reviewed  ======================================================  PAST MEDICAL & SURGICAL HISTORY:  CHF (congestive heart failure)    CAD (coronary artery disease)    Depression    Pleural effusion    History of 2019 novel coronavirus disease (COVID-19)  2020    Hemorrhoids    Bleeding hemorrhoids    Peripheral arterial disease    Claudication    BPH with urinary obstruction    ACC/AHA stage D systolic heart failure    Anticoagulation goal of INR 2.0 to 2.5    Falls    Clavicle fracture    CKD (chronic kidney disease), stage III    Iron deficiency anemia    H/O epistaxis    Vertigo    GI bleed    S/P TVR (tricuspid valve repair)    S/P ventricular assist device    S/P endoscopy      ====================ASSESSMENT ==============  Stage D Nonischemic Cardiomyopathy, Status Post HM2 on 2017    Cardiogenic shock  Hemodynamic instability   Acute hypoxemic respiratory failure s/p trach , decannulated on ; Reintubated on    GI bleed , Status Post Enteroscopy   Anemia, in setting of melena   Chronic Kidney Disease  Stress hyperglycemia   C.diff positive on    Hypovolemic shock  Septic shock  Leukocytosis  GB thickening/percholecystic s/p perc choley by IT   SMA stenosis  Serratia/citrobacter pneumonia   Stenotrophomonas pneumonia   Enterobacter pneumonia   Nasuea/vomiting  Deconditioning    Plan:  ====================== NEUROLOGY=====================  Sedated with IV Precedex for vent synchrony, wean as tolerated  Tylenol PRN for analgesia    acetaminophen    Suspension .. 650 milliGRAM(s) Enteral Tube every 6 hours PRN Mild Pain (1 - 3)  dexMEDEtomidine Infusion 1.5 MICROgram(s)/kG/Hr (23.1 mL/Hr) IV Continuous <Continuous>  fentaNYL    Injectable 25 MICROGram(s) IV Push once    ==================== RESPIRATORY======================  Acute hypoxemic respiratory failure s/p #8 shiley trach on ; decannulated on ; Reintubated on    Continue close monitoring of respiratory rate and breathing pattern, following of ABG’s with A-line monitoring, continuous pulse oximetry monitoring.   Scheduled for re-trach on Monday 10/4 with ENT bedside 1PM  CPAP as tolerated    Mechanical Ventilation:  Mode: AC/ CMV (Assist Control/ Continuous Mandatory Ventilation)  RR (machine): 20  TV (machine): 500  FiO2: 40  PEEP: 5  ITime: 1  MAP: 10  PIP: 22      ====================CARDIOVASCULAR==================  Stage D Nonischemic Cardiomyopathy, Status Post HM2 on 2017; LVAD settings 9200, flow 4.8  TTE : EF 12%, mild-mod AR, mild-mod TR, severe global LV systolic dysfxn, RV enlargement with decreased fxn, no obvious vegetations documented  monitor MAPs  Atrial tachycardia, s/p Mg and K supplementation  Consider re-adding Propranolol if BP tolerates      ===================HEMATOLOGIC/ONC ===================  CTA A/P on  +L RP hematoma   Acute blood loss anemia, monitor H&H/Plts    Platelets downtrending thrombocytopenia, possible in setting of sepsis, now starting to trend up  Rest of labs not consistent with DIC  Holding coumadin and ASA given recurrent GI bleeding, continue monitoring LDH     ===================== RENAL =========================  Continue monitoring urine output, I&OS, BUN/Cr   Euvolemic, even fluid balance, currently off diuretics.    Replete lytes PRN. Keep K> 4 and Mg >2  Albumin x2 given, monitor volume status    ==================== GASTROINTESTINAL===================  Tolerating tube feeds, Vital 1.5 shantelle @ 40cc/hr, increase as tolerated to goal 65  CTA A/P : Evaluation of the mesenteric vessels is limited by streak artifact from LVAD. There appears to be severe stenosis of the proximal SMA; abdominal mesenteric doppler is recommended for further evaluation. 2.  No small bowel findings to suggest acute mesenteric ischemia. 3.  Focal dissections involving the right and left common iliac arteries.  Mesenteric duplex on 8/15 borderline stenosis of proximal SMA, s/p failed SMA stent, L fem RP hematoma on 9/10; No intervention for cholecystostomy tube/PEJ at this time   Reglan for gut motility    multivitamin 1 Tablet(s) Oral daily  GI prophylaxis, pantoprazole  Injectable 40 milliGRAM(s) IV Push every 12 hours  simethicone 80 milliGRAM(s) Chew every 8 hours PRN Gas  sodium chloride 0.9%. 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       Per endocrine      - c/w Methimazole, adjust based on labs        - Check Free T4 and total T3     insulin lispro (ADMELOG) corrective regimen sliding scale   SubCutaneous every 6 hours  methimazole 20 milliGRAM(s) Oral every 8 hours    ========================INFECTIOUS DISEASE================  Afebrile, WBC rising 18.35 -> 19.50  Continue trending WBC and monitoring fever curve   CT C/A/P : progressive ISABELLE opacities and L consolidations, multifocal pneumonia  SCx  +Enterobacter cloacae complex, repeat BCx sent yesterday with GNR, Cefepime switched to Meropenem 10/1  BC continue to grow GNR, sensitive to Meropenem so far  Also started Vancomycin PO solution for C.diff prophylaxis.     meropenem  IVPB 1000 milliGRAM(s) IV Intermittent every 8 hours  vancomycin    Solution 125 milliGRAM(s) Oral every 12 hours      Patient requires continuous monitoring with bedside rhythm monitoring, pulse ox monitoring, and intermittent blood gas analysis. Care plan discussed with ICU care team. Patient remained critical and at risk for life threatening decompensation.     By signing my name below, I, Aparna Saleh, attest that this documentation has been prepared under the direction and in the presence of CLAUDIA Goldstein   Electronically signed: Cesia Villegas, 10-03-21 @ 08:05    I, Cristóbal Cisneros, personally performed the services described in this documentation. all medical record entries made by the scribe were at my direction and in my presence. I have reviewed the chart and agree that the record reflects my personal performance and is accurate and complete  Electronically signed: CLAUDIA Goldstein

## 2021-10-03 NOTE — PROGRESS NOTE ADULT - ASSESSMENT
Assessment and Recommendation:   · Assessment	  Assessment and recommendation :  Recurrent Acute hypoxic respiratory Failure reintubated on full ventilator support FI02 is 40%  Acute blood loss anemia S/P multiple  blood transfusion   recurrent  septic shock  weaned off  vasopressin   hemorrhagic shock receiving 3 unit of blood transfusion   pan culture negative on meropenem  , po vancomycin   S/P cholecystostomy tube placement by IR    AF RVR back to regular sinus Rhythm   Non ischemic cardiomyopathy continue ACE inhibitor and B-Blockers   mesenteric ischemia failure to stent SMA , no plan for repeated trial   S/P 3 unit of blood transfusion   S/P Septic shock and cardiogenic shock   Stage D systolic heart failure S/P LVAD HM2   MH2 LVAD  with  TV Annuloplasty  Severe peripheral vascular disease   severe hyperglycemia on insulin coverage    Reglan 10 mg for Gastric Motility   hyperthyroidism on Methimazole 10mg TID   critical care polyneuropathy   Anemia of Acute blood Loss   severe protein caloric malnutrition   Chronic kidney disease stage III  Depressed and withdrawn   resume NG tube feeding   GI prophylaxis with PPI   plan for tracheostomy in AM   Discussed with TCV team   critical care time is 35 minutes

## 2021-10-04 NOTE — PROGRESS NOTE ADULT - SUBJECTIVE AND OBJECTIVE BOX
RICKY HAMPTON  MRN-[id number]  Patient is a 66y old  Male who presents with a chief complaint of Anemia, Supratherapeutic INR, Dark Stools (04 Oct 2021 18:15)    HPI:  65M PMH ACC/AHA stage D HF due to NICM HM2 LVAD , TV annuloplasty ring 17 as destination therapy due to severe peripheral artery disease with significant stenosis  SIADH, Depression, CKD-3 with hyperkalemia, past E. coli UTIs, driveline drainage (21) and COVID-19 (back in 2020)  He was recently seen in clinic where he complained of abdominal pain and dark stools w constipation back in May. He presents to [facility] ER today weakness and fatigue, moderate and + Black stools for three days, on coumadin secondary to warfarin use in the setting of an LVAD. Patient has required transfusions for GIB in the past. Mostly recently back in 2021 pt had anemia with dark stools. No interventions was done at that time. However Last Endoscopy was done in 2020 (negative). Today labs show patient is anemic with H/H of 4.5/16.3,. INR is 8.84 MAP in the 90s, Temp 35.1. He denies any chest pain, shortness of breath, dizziness, abd pain, nausea or vomiting.       (2021 16:57)      Surgery/Hospital course:   admit for melena w/ anemia, INR 8.84   6/15 Capsul study (+) for small bowel bleed, balloon endoscopy (old blood in prox ileum); post EGD - septic w/ L opacity, re-intubated for concern for aspiration, TTE (Mod MR, decrease biV w/ interventricular septum boweing towards R)   bronch    +C Diff    CT C/A/P: Fluid filled colon which may be 2/2 rapid transit. Small bilateral pleffs with associates. Compressive atelectasis New ISABELLE & LLL  parenchymal opacities, suspicious for pneumonia. Moderate stenosis in the proximal superior mesenteric artery.    #8 Shiley trach at bedside    LVAD speed increased to 9200   Bronch   TC since . Patient transferred to SDU.    INR today 2.64.  H&H 7.3/24 this AM.  Will repeat CBC at noon, and will send stool guaiac Patient with persistent abdominal tenderness, rate of tube feeds decreased.  No nausea/vomiting.     INR today 2.4. H&H 9.1/28.6 low flow overnight /N&V, refusing Tube feeds on D5 1/2 normal  @50 cc/hr   INR 2.69  H&H 7.7/.1 refusing Tube feeds on D5 1/2 normal  @50 cc/hr. This am + BM Melena Dr Oneill HF  aware- PRBC x1  GI team consulted -  NPO  plan on study in am-  D/w Dr Cadet Patient  to return to CTU for further management; 1PRBCS    Post op INR 2.2 today.  No bleeding. BC + for SM.  Pt is hypotensive requiring pressor and inotropic support.  ID follow up today on Cefepime will follow.   R PTC for PTX    CT C/A/P: sub q emphysema in R chest wall, GGO RUL, small ascites CTH negative; Abd US: GB thickening, pericholecystic fluid     Perchole drain in place continues to drain total output overnight 133.  Fever today 38.8    duplex LE negative    Patient with persistent abdominal pain, refusing tube feeds and medications, Psych consulted   CTA A/P ordered to r/o mesenteric ischemia 2/2 persistent anorexia, nausea, vomiting. Revealed:  Evaluation of the mesenteric vessels is limited by streak artifact from LVAD. There appears to be severe stenosis of the proximal SMA; abdominal mesenteric doppler is recommended for further evaluation. 2.  No small bowel findings to suggest acute mesenteric ischemia. 3.  Focal dissections involving the right and left common iliac arteries.  8/15: Cultures resulted BC 1/2 +GPC in clusters, SC enterobacter; mesenteric duplex: borderline stenosis of proximal SMA  : CT C/A/P noncon: Nondular opacities in R lung apex w/cavitation, abd nl  :  Continue current care, treatment of thyrotoxicosis with medications as per endocrine, d/c ABX as per team.    RUQ sono: Contracted gallbladder with cholecystostomy tube in place.  9/10 failed SMA stent, L fem PSA, s/p 3U PRCB   CTA Abd/Pelvis L RP hematoma    Trach decannulated    intubated fro resp distress for increased WOB, LL pneumonia; CT C/A/P: progressive ISABELLE opacities and L consolidations, multifocal pneumonia    TTE (EF 25%, decreased RV, mod MR)   10/3 VT -> Lidocaine gtt   10/4 Trach size 6 DCT cuff    Today/Overnight:  -  Trach size 6 DCT cuff  - VT overnight, S/P Lido drip, now Lido drip is dc'ed due to possible altering of mental status  - TTE showed no pericardial effusion  - BCx  + Serratia marcescens, BCx 10/1 +Enterobacter cloacae complex, is sensitive to Meropenem, will continue    Vital Signs Last 24 Hrs  T(C): 37 (04 Oct 2021 16:00), Max: 37.5 (04 Oct 2021 08:00)  T(F): 98.6 (04 Oct 2021 16:00), Max: 99.5 (04 Oct 2021 08:00)  HR: 66 (04 Oct 2021 19:00) (66 - 119)  BP: --  BP(mean): --  RR: 16 (04 Oct 2021 19:00) (16 - 41)  SpO2: 100% (04 Oct 2021 19:00) (97% - 100%)  ============================I/O===========================  I&O's Detail    03 Oct 2021 07:01  -  04 Oct 2021 07:00  --------------------------------------------------------  IN:    Albumin 5%  - 250 mL: 750 mL    Dexmedetomidine: 369.6 mL    Enteral Tube Flush: 110 mL    IV PiggyBack: 450 mL    Lidocaine: 15 mL    Lidocaine: 135 mL    Miscellaneous Tube Feedin mL    sodium chloride 0.9%: 240 mL  Total IN: 2709.6 mL    OUT:    Drain (mL): 150 mL    Indwelling Catheter - Urethral (mL): 2045 mL    Vasopressin: 0 mL  Total OUT: 2195 mL    Total NET: 514.6 mL      04 Oct 2021 07:01  -  04 Oct 2021 19:13  --------------------------------------------------------  IN:    Albumin 5%  - 250 mL: 500 mL    Dexmedetomidine: 115.5 mL    Enteral Tube Flush: 20 mL    IV PiggyBack: 250 mL    IV PiggyBack: 100 mL    Miscellaneous Tube Feedin mL    sodium chloride 0.9%: 120 mL  Total IN: 1125.5 mL    OUT:    Drain (mL): 185 mL    Indwelling Catheter - Urethral (mL): 550 mL    Lidocaine: 0 mL  Total OUT: 735 mL    Total NET: 390.5 mL        ============================ LABS =========================                        8.8    20.51 )-----------( 73       ( 04 Oct 2021 00:31 )             27.8     10-04    136  |  103  |  14  ----------------------------<  155<H>  4.1   |  21<L>  |  0.38<L>    Ca    9.3      04 Oct 2021 00:29  Phos  2.0     10-  Mg     2.1     10-    TPro  7.2  /  Alb  3.9  /  TBili  1.4<H>  /  DBili  x   /  AST  23  /  ALT  20  /  AlkPhos  196<H>  1004    LIVER FUNCTIONS - ( 04 Oct 2021 00:29 )  Alb: 3.9 g/dL / Pro: 7.2 g/dL / ALK PHOS: 196 U/L / ALT: 20 U/L / AST: 23 U/L / GGT: x           PT/INR - ( 04 Oct 2021 00:31 )   PT: 13.2 sec;   INR: 1.11 ratio         PTT - ( 04 Oct 2021 00:31 )  PTT:29.8 sec  ABG - ( 04 Oct 2021 18:34 )  pH, Arterial: 7.47  pH, Blood: x     /  pCO2: 29    /  pO2: 188   / HCO3: 21    / Base Excess: -2.0  /  SaO2: 100.0               ======================Micro/Rad/Cardio=================  Culture: Reviewed   CXR: Reviewed  Echo: Reviewed  ======================================================  PAST MEDICAL & SURGICAL HISTORY:  CHF (congestive heart failure)    CAD (coronary artery disease)    Depression    Pleural effusion    History of 2019 novel coronavirus disease (COVID-19)  2020    Hemorrhoids    Bleeding hemorrhoids    Peripheral arterial disease    Claudication    BPH with urinary obstruction    ACC/AHA stage D systolic heart failure    Anticoagulation goal of INR 2.0 to 2.5    Falls    Clavicle fracture    CKD (chronic kidney disease), stage III    Iron deficiency anemia    H/O epistaxis    Vertigo    GI bleed    S/P TVR (tricuspid valve repair)    S/P ventricular assist device    S/P endoscopy      ========================ASSESSMENT ================  Stage D Nonischemic Cardiomyopathy, Status Post HM2 on 2017    Cardiogenic shock  Hemodynamic instability   Acute hypoxemic respiratory failure s/p trach , decannulated on ; Reintubated on    GI bleed , Status Post Enteroscopy   Anemia, in setting of melena   Thrombocytopenia   Chronic Kidney Disease  Stress hyperglycemia   C.diff positive on    Hypovolemic shock  Septic shock  Leukocytosis  GB thickening/percholecystic s/p perc choley by IT   SMA stenosis  Serratia/citrobacter pneumonia   Stenotrophomonas pneumonia   Enterobacter pneumonia   Nasuea/vomiting  Deconditioning    Plan:  ====================== NEUROLOGY=====================  Sedated with IV Precedex for vent synchrony   Tylenol PRN for analgesia    acetaminophen    Suspension .. 650 milliGRAM(s) Enteral Tube every 6 hours PRN Mild Pain (1 - 3)  dexMEDEtomidine Infusion 1.5 MICROgram(s)/kG/Hr (23.1 mL/Hr) IV Continuous <Continuous>      ==================== RESPIRATORY======================  Acute hypoxemic respiratory failure s/p #8 shiley trach on ; decannulated on ; Reintubated on    Continue close monitoring of respiratory rate and breathing pattern, following of ABG’s with A-line monitoring, continuous pulse oximetry monitoring.   Trach size 6 DCT cuff 10/4    Mechanical Ventilation:  Mode: AC/ CMV (Assist Control/ Continuous Mandatory Ventilation)  RR (machine): 16  TV (machine): 500  FiO2: 40  PEEP: 5  ITime: 1  MAP: 10  PIP: 23      ====================CARDIOVASCULAR==================  Stage D Nonischemic Cardiomyopathy, Status Post HM2 on 2017; LVAD settings 9200, flow 5.2  TTE : EF 12%, mild-mod AR, mild-mod TR, severe global LV systolic dysfxn, RV enlargement with decreased fxn, no obvious vegetations documented  monitor MAPs  VT yesterday, s/p Lidocaine drip, weaned to off this AM  Holding off on Propranolol for now   TTE 10/4 no pericardial effusion    ===================HEMATOLOGIC/ONC ===================  CTA A/P on  +L RP hematoma   Acute blood loss anemia, monitor H&H/Plts    Thrombocytopenia, possible in setting of sepsis, now starting to trend up, 57->73; Rest of labs not consistent with DIC  Holding coumadin and ASA given recurrent GI bleeding, continue monitoring LDH     ===================== RENAL =========================  Hx of CKD, continue monitoring urine output, I&OS, BUN/Cr   Euvolemic, even fluid balance, currently off diuretics.    Replete lytes PRN. Keep K> 4 and Mg >2      ==================== GASTROINTESTINAL===================  Tolerating TF Vital AF 1.5, @ goal 40 cc/hr.   CTA A/P : Evaluation of the mesenteric vessels is limited by streak artifact from LVAD. There appears to be severe stenosis of the proximal SMA; abdominal mesenteric doppler is recommended for further evaluation. 2.  No small bowel findings to suggest acute mesenteric ischemia. 3.  Focal dissections involving the right and left common iliac arteries.  Mesenteric duplex on 8/15 borderline stenosis of proximal SMA, s/p failed SMA stent, L fem RP hematoma on 9/10; No intervention for cholecystostomy tube/PEJ at this time   Reglan for gut motility  Continue Protonix for stress ulcer prophylaxis.   PRN Simethicone for gas    metoclopramide Injectable 10 milliGRAM(s) IV Push every 8 hours  multivitamin 1 Tablet(s) Oral daily  pantoprazole  Injectable 40 milliGRAM(s) IV Push every 12 hours  simethicone 80 milliGRAM(s) Chew every 8 hours PRN Gas  sodium chloride 0.9%. 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       Per endocrine      - c/w Methimazole, adjust based on labs        - Check Free T4 and total T3     insulin lispro (ADMELOG) corrective regimen sliding scale   SubCutaneous every 6 hours  methimazole 20 milliGRAM(s) Oral <User Schedule>    ========================INFECTIOUS DISEASE================  Temp 99.5F, WBC rising 19.50->20.51   Continue trending WBC and monitoring fever curve   CT C/A/P : progressive ISABELLE opacities and L consolidations, multifocal pneumonia  BCx  + Serratia marcescens, BCx 10/1 +Enterobacter cloacae complex, c/w Meropenem   Vancomycin solution for C.diff prophylaxis.     meropenem  IVPB 1000 milliGRAM(s) IV Intermittent every 8 hours  vancomycin    Solution 125 milliGRAM(s) Oral every 12 hours      Patient requires continuous monitoring with bedside rhythm monitoring, pulse oximetry monitoring, and continuous central venous and arterial pressure monitoring; and intermittent blood gas analysis.  Care plan discussed with ICU care team.    Patient remained critical, at risk for life threatening decompensation.   I have spent 35 minutes providing acute care with multiple re-evaluations throughout the evening.     By signing my name below, I, Daniela Chowdary, attest that this documentation has been prepared under the direction and in the presence of Kali Mendoza NP.  Electronically signed: Cesia Suresh, 10-04-21 @ 19:13    I, Kali eMndoza NP, personally performed the services described in this documentation. All medical record entries made by the luisibe were at my direction and in my presence. I have reviewed the chart and agree that the record reflects my personal performance and is accurate and complete  Electronically signed: Kali Mendoza NP, 10-04-21 @ 19:13

## 2021-10-04 NOTE — PROGRESS NOTE ADULT - PROBLEM SELECTOR PLAN 4
- Chronic in nature with questionable SMA stenosis on imaging and mesenteric duplex difficult to interpret with continuous flow. Underwent angiogram on 9/11 which showed SMA stenosis. Unable to place stent. Would hold off on repeating angiogram at this time.   - Holding tube feeds for trach placement today   - Discussed cholecystostomy with general surgery, unlikely to derive symptomatic benefit since perc dawn tube continues to drain. Repeat RUQ shows contracted gallbladder. Per general surgery holding off on removal of drain at this time. - Chronic in nature with SMA stenosis on imaging and mesenteric duplex difficult to interpret with continuous flow. Underwent angiogram on 9/11 which showed SMA stenosis. Unable to place stent. Potentially can approach from subclavian artery access.  - Discussed cholecystostomy with general surgery, unlikely to derive symptomatic benefit since perc dawn tube continues to drain. Repeat RUQ shows contracted gallbladder. Per general surgery holding off on removal of drain at this time.  - Intermittent tolerance of tube feeds

## 2021-10-04 NOTE — PROGRESS NOTE ADULT - ASSESSMENT
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 coli UTIs, driveline drainage (1/7/21) and COVID-19 (back in April 2020)  He was recently seen in clinic where he complained of abdominal pain and dark stools w constipation back in May. He presents to [facility] ER today weakness and fatigue, moderate and + Black stools for three days, on coumadin secondary to warfarin use in the setting of an LVAD. Patient has required transfusions for GIB in the past. Mostly recently back in jan 2021 pt had anemia with dark stools. No interventions was done at that time. However Last Endoscopy was done in July 2020 (negative). Today labs show patient is anemic with H/H of 4.5/16.3,. INR is 8.84 MAP in the 90s,   Returned from endoscopy appearing ill tachypneic with chills with new white out of his left lung      A/p  s/p LVAD placement  admission prolonged following GI bleeding, aspiration event, CDI, VAP, chronic abdominal pain, most recently with retroperitoneal bleeding post attempted stent placement  Acute event while on the floor with possible aspiration and required re-intubation and transfer to cTU  Antibiotics with Meropenem  serratia in blood culture on 9/30  and enterobacter in blood culture 10/1 likely from a GI source given the multiple GNRs grown  Continue Meropenem  plan 2 week course  repeat blood cutlures    Morris Prater MD  [phone]  After 5pm/weekends [phone]

## 2021-10-04 NOTE — PROGRESS NOTE ADULT - SUBJECTIVE AND OBJECTIVE BOX
Subjective: Patient seen and examined resting in bed. Schedule to undergo trach today     Medications:  acetaminophen    Suspension .. 650 milliGRAM(s) Enteral Tube every 6 hours PRN  chlorhexidine 0.12% Liquid 15 milliLiter(s) Oral Mucosa two times a day  chlorhexidine 2% Cloths 1 Application(s) Topical <User Schedule>  dexMEDEtomidine Infusion 1.5 MICROgram(s)/kG/Hr IV Continuous <Continuous>  insulin lispro (ADMELOG) corrective regimen sliding scale   SubCutaneous every 6 hours  meropenem  IVPB 1000 milliGRAM(s) IV Intermittent every 8 hours  methimazole 20 milliGRAM(s) Oral <User Schedule>  metoclopramide Injectable 10 milliGRAM(s) IV Push every 8 hours  multivitamin 1 Tablet(s) Oral daily  pantoprazole  Injectable 40 milliGRAM(s) IV Push every 12 hours  simethicone 80 milliGRAM(s) Chew every 8 hours PRN  sodium chloride 0.9%. 1000 milliLiter(s) IV Continuous <Continuous>  thiamine 100 milliGRAM(s) Oral daily  vancomycin    Solution 125 milliGRAM(s) Oral every 12 hours    ICU Vital Signs Last 24 Hrs  T(C): 37 (04 Oct 2021 12:00), Max: 37.5 (04 Oct 2021 08:00)  T(F): 98.6 (04 Oct 2021 12:00), Max: 99.5 (04 Oct 2021 08:00)  HR: 114 (04 Oct 2021 13:00) (66 - 114)  BP: --  BP(mean): --  ABP: 84/72 (04 Oct 2021 13:00) (49/49 - 135/82)  ABP(mean): 77 (04 Oct 2021 13:00) (49 - 101)  RR: 20 (04 Oct 2021 13:00) (20 - 41)  SpO2: 100% (04 Oct 2021 13:00) (97% - 100%)    Mode: AC/ CMV (Assist Control/ Continuous Mandatory Ventilation)  RR (machine): 20  TV (machine): 500  FiO2: 40  PEEP: 5  ITime: 1  MAP: 10  PIP: 21      Weight in k.8 (10-04 @ 00:00)    I&O's Summary    03 Oct 2021 07:01  -  04 Oct 2021 07:00  --------------------------------------------------------  IN: 2709.6 mL / OUT: 2195 mL / NET: 514.6 mL    04 Oct 2021 07:01  -  04 Oct 2021 13:51  --------------------------------------------------------  IN: 190 mL / OUT: 385 mL / NET: -195 mL        Physical Exam  General: No distress, intubated  Neck: JVP not appreciated   Chest: +vent sounds  CV: +VAD hum   Abdomen: Soft, non-distended, tenderness noted in RUQ, + billary drain in place  Neurology: Alert and oriented     LVAD Interrogation  VAD TYPE HM 2  Speed 9200  Flow 5  Power 5.5  PI  6.7  Assessment of driveline exit site: driveline dressing c/d/i  Programming changes: no changes made    Labs:                        8.8    20.51 )-----------( 73       ( 04 Oct 2021 00:31 )             27.8     10-    136  |  103  |  14  ----------------------------<  155<H>  4.1   |  21<L>  |  0.38<L>    Ca    9.3      04 Oct 2021 00:29  Phos  2.0     10  Mg     2.1     10-04    TPro  7.2  /  Alb  3.9  /  TBili  1.4<H>  /  DBili  x   /  AST  23  /  ALT  20  /  AlkPhos  196<H>  10-04    PT/INR - ( 04 Oct 2021 00:31 )   PT: 13.2 sec;   INR: 1.11 ratio         PTT - ( 04 Oct 2021 00:31 )  PTT:29.8 sec          Lactate Dehydrogenase, Serum: 195 U/L (10-04 @ 00:29)  Lactate Dehydrogenase, Serum: 181 U/L (10-03 @ 01:10)  Lactate Dehydrogenase, Serum: 196 U/L (10-02 @ 00:34)       Subjective: Patient seen and examined resting in bed. Schedule to undergo trach today     Medications:  acetaminophen    Suspension .. 650 milliGRAM(s) Enteral Tube every 6 hours PRN  chlorhexidine 0.12% Liquid 15 milliLiter(s) Oral Mucosa two times a day  chlorhexidine 2% Cloths 1 Application(s) Topical <User Schedule>  dexMEDEtomidine Infusion 1.5 MICROgram(s)/kG/Hr IV Continuous <Continuous>  insulin lispro (ADMELOG) corrective regimen sliding scale   SubCutaneous every 6 hours  meropenem  IVPB 1000 milliGRAM(s) IV Intermittent every 8 hours  methimazole 20 milliGRAM(s) Oral <User Schedule>  metoclopramide Injectable 10 milliGRAM(s) IV Push every 8 hours  multivitamin 1 Tablet(s) Oral daily  pantoprazole  Injectable 40 milliGRAM(s) IV Push every 12 hours  simethicone 80 milliGRAM(s) Chew every 8 hours PRN  sodium chloride 0.9%. 1000 milliLiter(s) IV Continuous <Continuous>  thiamine 100 milliGRAM(s) Oral daily  vancomycin    Solution 125 milliGRAM(s) Oral every 12 hours    ICU Vital Signs Last 24 Hrs  T(C): 37 (04 Oct 2021 12:00), Max: 37.5 (04 Oct 2021 08:00)  T(F): 98.6 (04 Oct 2021 12:00), Max: 99.5 (04 Oct 2021 08:00)  HR: 114 (04 Oct 2021 13:00) (66 - 114)  BP: --  BP(mean): --  ABP: 84/72 (04 Oct 2021 13:00) (49/49 - 135/82)  ABP(mean): 77 (04 Oct 2021 13:00) (49 - 101)  RR: 20 (04 Oct 2021 13:00) (20 - 41)  SpO2: 100% (04 Oct 2021 13:00) (97% - 100%)    Mode: AC/ CMV (Assist Control/ Continuous Mandatory Ventilation)  RR (machine): 20  TV (machine): 500  FiO2: 40  PEEP: 5  ITime: 1  MAP: 10  PIP: 21      Weight in k.8 (10-04 @ 00:00)    I&O's Summary    03 Oct 2021 07:01  -  04 Oct 2021 07:00  --------------------------------------------------------  IN: 2709.6 mL / OUT: 2195 mL / NET: 514.6 mL    04 Oct 2021 07:01  -  04 Oct 2021 13:51  --------------------------------------------------------  IN: 190 mL / OUT: 385 mL / NET: -195 mL        Physical Exam  General: No distress, intubated  Neck: JVP not appreciated   Chest: CTA anterolaterally  CV: +VAD hum   Abdomen: Soft, non-distended, tenderness noted in RUQ, + biliary drain in place  Neurology: Alert and oriented     LVAD Interrogation  VAD TYPE HM 2  Speed 9200  Flow 5  Power 5.5  PI  6.7  Assessment of driveline exit site: driveline dressing c/d/i  Programming changes: no changes made    Labs:                        8.8    20.51 )-----------( 73       ( 04 Oct 2021 00:31 )             27.8     10-    136  |  103  |  14  ----------------------------<  155<H>  4.1   |  21<L>  |  0.38<L>    Ca    9.3      04 Oct 2021 00:29  Phos  2.0     10  Mg     2.1     10-04    TPro  7.2  /  Alb  3.9  /  TBili  1.4<H>  /  DBili  x   /  AST  23  /  ALT  20  /  AlkPhos  196<H>  1004    PT/INR - ( 04 Oct 2021 00:31 )   PT: 13.2 sec;   INR: 1.11 ratio         PTT - ( 04 Oct 2021 00:31 )  PTT:29.8 sec          Lactate Dehydrogenase, Serum: 195 U/L (10-04 @ 00:29)  Lactate Dehydrogenase, Serum: 181 U/L (10-03 @ 01:10)  Lactate Dehydrogenase, Serum: 196 U/L (10-02 @ 00:34)

## 2021-10-04 NOTE — BRIEF OPERATIVE NOTE - COMMENTS
#8 DCT trach with surgicel x1 placed, sutures x4 and umbilical tie placed
Pt tolerated the procedure well without complications. A #6 shiley DCT trach placed.

## 2021-10-04 NOTE — PROGRESS NOTE ADULT - PROBLEM SELECTOR PLAN 1
- has been weaned off pressors  - MAPs at goal 70-80 - has been weaned off pressors  - MAPs at goal 70-80  - unable to tolerate any GDMT

## 2021-10-04 NOTE — PROGRESS NOTE ADULT - PROBLEM SELECTOR PLAN 6
- postop angiogram H/H continue to drop. Intraop received 3uPRBC and post-op required additional 2uPRBC (last on 9/12).   - after starting heparin gtt, started having episodes of melena. Heparin gtt d/c and was transfused last on 9/26   - Continue to trend H/H and have active type and screen  - CT Angio 9/12 shows moderate sized left-sided retroperitoneal hematoma with intraperitoneal extension. 2 cm segment of right femoral artery occlusion with distal reconstitution. - postop angiogram H/H continue to drop. Intraop received 3uPRBC and post-op required additional 2uPRBC (last on 9/12).   - after starting heparin gtt, started having episodes of melena. Heparin gtt d/c and was transfused last on 9/26   - CT Angio 9/12 shows moderate sized left-sided retroperitoneal hematoma with intraperitoneal extension. 2 cm segment of right femoral artery occlusion with distal reconstitution.  - He remains off AC

## 2021-10-04 NOTE — PROGRESS NOTE ADULT - PROBLEM SELECTOR PLAN 8
- endocrine recs appreciated  - would discuss with endocrine about resuming propanolol   - currently on methimazole  - Steroid taper completed  - Continue to monitor TFT post angiogram - endocrine recs appreciated  - would discuss with endocrine about resuming propanolol   - currently on methimazole  - Steroid taper completed  - Next TFTs on 10/6 per Endocrine

## 2021-10-04 NOTE — PROGRESS NOTE ADULT - ASSESSMENT
Assessment and Recommendation:   · Assessment	  Assessment and recommendation :  Recurrent Acute hypoxic respiratory Failure reintubated on full ventilator support FI02 is 40%  going for tracheostomy today   Acute blood loss anemia S/P multiple  blood transfusion   recurrent  septic shock  weaned off  vasopressin   hemorrhagic shock receiving 3 unit of blood transfusion   pan culture negative on meropenem  , po vancomycin   S/P cholecystostomy tube placement by IR    AF RVR back to regular sinus Rhythm   Non ischemic cardiomyopathy continue ACE inhibitor and B-Blockers   mesenteric ischemia failure to stent SMA , no plan for repeated trial   S/P 3 unit of blood transfusion   S/P Septic shock and cardiogenic shock   Stage D systolic heart failure S/P LVAD HM2   MH2 LVAD  with  TV Annuloplasty  Severe peripheral vascular disease   severe hyperglycemia on insulin coverage    Reglan 10 mg for Gastric Motility   hyperthyroidism on Methimazole 10mg TID   critical care polyneuropathy   Anemia of Acute blood Loss   severe protein caloric malnutrition   Chronic kidney disease stage III  Depressed and withdrawn   resume NG tube feeding   GI prophylaxis with PPI   plan for tracheostomy in AM   Discussed with TCV team   critical care time is 35 minutes

## 2021-10-04 NOTE — PROGRESS NOTE ADULT - SUBJECTIVE AND OBJECTIVE BOX
INFECTIOUS DISEASES FOLLOW UP-- Anitra Prater  [phone]    This is a follow up note for this  66yMale with  Anemia    Acute cholecystitis  prolonged CTU stay  s/p repeat trach placement        ROS:  CONSTITUTIONAL:  trach sedated    Allergies    Amiodarone Hydrochloride (Other (Severe))    Intolerances        ANTIBIOTICS/RELEVANT:  antimicrobials  meropenem  IVPB 1000 milliGRAM(s) IV Intermittent every 8 hours  vancomycin    Solution 125 milliGRAM(s) Oral every 12 hours    immunologic:    OTHER:  acetaminophen    Suspension .. 650 milliGRAM(s) Enteral Tube every 6 hours PRN  chlorhexidine 0.12% Liquid 15 milliLiter(s) Oral Mucosa two times a day  chlorhexidine 2% Cloths 1 Application(s) Topical <User Schedule>  dexMEDEtomidine Infusion 1.5 MICROgram(s)/kG/Hr IV Continuous <Continuous>  insulin lispro (ADMELOG) corrective regimen sliding scale   SubCutaneous every 6 hours  methimazole 20 milliGRAM(s) Oral <User Schedule>  metoclopramide Injectable 10 milliGRAM(s) IV Push every 8 hours  multivitamin 1 Tablet(s) Oral daily  pantoprazole  Injectable 40 milliGRAM(s) IV Push every 12 hours  potassium chloride  10 mEq/50 mL IVPB 10 milliEquivalent(s) IV Intermittent every 1 hour  simethicone 80 milliGRAM(s) Chew every 8 hours PRN  sodium chloride 0.9%. 1000 milliLiter(s) IV Continuous <Continuous>  thiamine 100 milliGRAM(s) Oral daily      Objective:  Vital Signs Last 24 Hrs  T(C): 37 (04 Oct 2021 16:00), Max: 37.5 (04 Oct 2021 08:00)  T(F): 98.6 (04 Oct 2021 16:00), Max: 99.5 (04 Oct 2021 08:00)  HR: 73 (04 Oct 2021 18:00) (66 - 119)  BP: --  BP(mean): --  RR: 16 (04 Oct 2021 18:00) (16 - 41)  SpO2: 100% (04 Oct 2021 18:00) (97% - 100%)    PHYSICAL EXAM:  Constitutional: cachectic  Eyes:ALONSO, EOMI  Ear/Nose/Throat: no oral lesions,  new trach placed- site no bleeding	  Respiratory: audible bilat  Cardiovascular: VAD sounds  Gastrointestinal: no changes  Extremities:no e/e/c  No Lymphadenopathy  IV sites not inflammed.    LABS:                        8.8    20.51 )-----------( 73       ( 04 Oct 2021 00:31 )             27.8     10-04    136  |  103  |  14  ----------------------------<  155<H>  4.1   |  21<L>  |  0.38<L>    Ca    9.3      04 Oct 2021 00:29  Phos  2.0     10-04  Mg     2.1     10-04    TPro  7.2  /  Alb  3.9  /  TBili  1.4<H>  /  DBili  x   /  AST  23  /  ALT  20  /  AlkPhos  196<H>  10-04    PT/INR - ( 04 Oct 2021 00:31 )   PT: 13.2 sec;   INR: 1.11 ratio         PTT - ( 04 Oct 2021 00:31 )  PTT:29.8 sec      MICROBIOLOGY:            RECENT CULTURES:  10-01 @ 05:42  .Blood Blood-Peripheral  --  --  --    No growth to date.  --  10-01 @ 05:41  .Blood Blood-Peripheral  Enterobacter cloacae complex  Enterobacter cloacae complex  CLAU    Growth in anaerobic bottle: Enterobacter cloacae complex  --  09-30 @ 16:56  .Blood Blood-Peripheral  Blood Culture PCR  Serratia marcescens  Blood Culture PCR  PCR    Growth in aerobic and anaerobic bottles: Serratia marcescens  See previous culture [id number]  --      RADIOLOGY & ADDITIONAL STUDIES:    < from: Xray Chest 1 View- PORTABLE-Routine (Xray Chest 1 View- PORTABLE-Routine in AM.) (10.04.21 @ 02:25) >  IMPRESSION:    The heart is normal in size. The lungs appear to be clear. No pleural effusion. No pneumothorax. All life supporting devices are in good position and unchanged when compared to previous study done October 1, 2021 at 2:03 AM. Status post sternotomy.    < end of copied text >

## 2021-10-04 NOTE — PROGRESS NOTE ADULT - ATTENDING COMMENTS
Overall, a very challenging hospital course for Mr. Quispe, who is well known to our service. Active issues currently being addressed include:    1. Respiratory failure - plan for trach today by ENT. His sister, HCP, has agreed to proceed.  2. Bacteremia/sepsis - Unclear source, but may be enteric. GB doesn't seem to be the issue and drain is in place and functional. Antibiotics per ID.  3. SMA stenosis - Vascular may reattempt via subclavian approach.   4. Stage D HF s/p LVAD - stable without evidence of device malfunction. MAP at goal, off pressors. Unable to tolerate AC or ECASA due to recurrent GI bleeding. LDH stable.    Overall prognosis is poor given recurrent bleeding and infection, but he is on appropriate therapies at the moment. He is frail and cachectic and will certainly need rehab should we be able to advance his diet at any point. He was adamant in the past about not receiving a surgical PEG or PEJ. Hopefully we will be able to work with him after the trach to sort through his GOC.

## 2021-10-04 NOTE — PROGRESS NOTE ADULT - PROBLEM SELECTOR PLAN 5
- ID following and appreciate recommendations   - currently remains febrile and continues to have worsening leukocytosis  - serratia bacteremia and poss acalculous cholecystitis. B/c with gram + cocci and many enterobacter Carbapenem resistant strain and now s/p per dawn drain  - enterobacter from sputum culture 8/13 and 9/27 and serratia marcescens from sputum culture 9/14  - CT Scan 9/26 showed multifocal PNA  - currently on meropenum IV  - was recultured on 9/30 and positive enterobacter colace   - continue with PO Vanco for C Diff ppx - ID following and appreciate recommendations   - low grade temp and persistent leukocytosis  - serratia bacteremia and poss acalculous cholecystitis. B/c with gram + cocci and many enterobacter Carbapenem resistant strain and now s/p per adwn drain  - enterobacter from sputum culture 8/13 and 9/27 and serratia marcescens from sputum culture 9/14  - CT Scan 9/26 showed multifocal PNA  - currently on meropenum IV  - was recultured on 9/30 and positive enterobacter cloacae  - continue with PO Vanco for C Diff ppx

## 2021-10-04 NOTE — PROGRESS NOTE ADULT - SUBJECTIVE AND OBJECTIVE BOX
RICKY HAMPTON  MRN-[id number]  Patient is a 66y old  Male who presents with a chief complaint of Anemia, Supratherapeutic INR, Dark Stools (03 Oct 2021 20:13)    HPI:  65M PMH ACC/AHA stage D HF due to NICM HM2 LVAD , TV annuloplasty ring 17 as destination therapy due to severe peripheral artery disease with significant stenosis  SIADH, Depression, CKD-3 with hyperkalemia, past E. coli UTIs, driveline drainage (21) and COVID-19 (back in 2020)  He was recently seen in clinic where he complained of abdominal pain and dark stools w constipation back in May. He presents to [facility] ER today weakness and fatigue, moderate and + Black stools for three days, on coumadin secondary to warfarin use in the setting of an LVAD. Patient has required transfusions for GIB in the past. Mostly recently back in 2021 pt had anemia with dark stools. No interventions was done at that time. However Last Endoscopy was done in 2020 (negative). Today labs show patient is anemic with H/H of 4.5/16.3,. INR is 8.84 MAP in the 90s, Temp 35.1. He denies any chest pain, shortness of breath, dizziness, abd pain, nausea or vomiting.       (2021 16:57)      Surgery/Hospital Course:   admit for melena w/ anemia, INR 8.84   6/15 Capsul study (+) for small bowel bleed, balloon endoscopy (old blood in prox ileum); post EGD - septic w/ L opacity, re-intubated for concern for aspiration, TTE (Mod MR, decrease biV w/ interventricular septum boweing towards R)   bronch    +C Diff    CT C/A/P: Fluid filled colon which may be 2/2 rapid transit. Small bilateral pleffs with associates. Compressive atelectasis New ISABELLE & LLL  parenchymal opacities, suspicious for pneumonia. Moderate stenosis in the proximal superior mesenteric artery.    #8 Shiley trach at bedside    LVAD speed increased to 9200   Bronch   TC since . Patient transferred to SDU.    INR today 2.64.  H&H 7.3/24 this AM.  Will repeat CBC at noon, and will send stool guaiac Patient with persistent abdominal tenderness, rate of tube feeds decreased.  No nausea/vomiting.     INR today 2.4. H&H 9.1/28.6 low flow overnight /N&V, refusing Tube feeds on D5 1/2 normal  @50 cc/hr   INR 2.69  H&H 7.7/.1 refusing Tube feeds on D5 1/2 normal  @50 cc/hr. This am + BM Melena Dr Oneill HF  aware- PRBC x1  GI team consulted -  NPO  plan on study in am-  D/w Dr Cadet Patient  to return to CTU for further management; 1PRBCS    Post op INR 2.2 today.  No bleeding. BC + for SM.  Pt is hypotensive requiring pressor and inotropic support.  ID follow up today on Cefepime will follow.   R PTC for PTX    CT C/A/P: sub q emphysema in R chest wall, GGO RUL, small ascites CTH negative; Abd US: GB thickening, pericholecystic fluid     Perchole drain in place continues to drain total output overnight 133.  Fever today 38.8    duplex LE negative    Patient with persistent abdominal pain, refusing tube feeds and medications, Psych consulted   CTA A/P ordered to r/o mesenteric ischemia 2/2 persistent anorexia, nausea, vomiting. Revealed:  Evaluation of the mesenteric vessels is limited by streak artifact from LVAD. There appears to be severe stenosis of the proximal SMA; abdominal mesenteric doppler is recommended for further evaluation. 2.  No small bowel findings to suggest acute mesenteric ischemia. 3.  Focal dissections involving the right and left common iliac arteries.  8/15: Cultures resulted BC 1/2 +GPC in clusters, SC enterobacter; mesenteric duplex: borderline stenosis of proximal SMA  : CT C/A/P noncon: Nondular opacities in R lung apex w/cavitation, abd nl  :  Continue current care, treatment of thyrotoxicosis with medications as per endocrine, d/c ABX as per team.    RUQ sono: Contracted gallbladder with cholecystostomy tube in place.  9/10 failed SMA stent, L fem PSA, s/p 3U PRCB   CTA Abd/Pelvis L RP hematoma    Trach decannulated    intubated fro resp distress for increased WOB, LL pneumonia; CT C/A/P: progressive ISABELLE opacities and L consolidations, multifocal pneumonia    TTE (EF 25%, decreased RV, mod MR)   10/3 VT -> Lidocaine gtt     Today:    REVIEW OF SYSTEMS:  Unable to obtain, pt intubated and sedated     ICU Vital Signs Last 24 Hrs  T(C): 37.5 (04 Oct 2021 08:00), Max: 37.5 (04 Oct 2021 08:00)  T(F): 99.5 (04 Oct 2021 08:00), Max: 99.5 (04 Oct 2021 08:00)  HR: 94 (04 Oct 2021 09:00) (66 - 94)  BP: --  BP(mean): --  ABP: 77/72 (04 Oct 2021 09:00) (49/49 - 135/82)  ABP(mean): 75 (04 Oct 2021 09:00) (49 - 101)  RR: 20 (04 Oct 2021 09:00) (19 - 41)  SpO2: 100% (04 Oct 2021 09:00) (97% - 100%)      Physical Exam:  Gen:  intubated, NAD  CNS: moves all extremities to command on low dose sedation   Neck: no JVD, gauze over trach wound  RES : coarse breath sounds, no wheezing    CVS: +LVAD hum   Abd: Soft. Sybil drain with bilious fluid. Positive BS throughout. Mild RUQ abdominal tenderness by perc sybil.  : Landrum intact draining urine without hematuria.  Skin: No rash, erythema, cyanosis.  Vasc: Warm and well-perfused.  Ext:  no edema    ============================I/O===========================   I&O's Detail    03 Oct 2021 07:01  -  04 Oct 2021 07:00  --------------------------------------------------------  IN:    Albumin 5%  - 250 mL: 750 mL    Dexmedetomidine: 369.6 mL    Enteral Tube Flush: 110 mL    IV PiggyBack: 450 mL    Lidocaine: 135 mL    Lidocaine: 15 mL    Miscellaneous Tube Feedin mL    sodium chloride 0.9%: 240 mL  Total IN: 2709.6 mL    OUT:    Drain (mL): 150 mL    Indwelling Catheter - Urethral (mL): 2045 mL    Vasopressin: 0 mL  Total OUT: 2195 mL    Total NET: 514.6 mL      04 Oct 2021 07:01  -  04 Oct 2021 09:05  --------------------------------------------------------  IN:    Enteral Tube Flush: 20 mL    sodium chloride 0.9%: 20 mL  Total IN: 40 mL    OUT:    Dexmedetomidine: 0 mL    Drain (mL): 30 mL    Indwelling Catheter - Urethral (mL): 190 mL    Lidocaine: 0 mL    Miscellaneous Tube Feedin mL  Total OUT: 220 mL    Total NET: -180 mL        ============================ LABS =========================                        8.8    20.51 )-----------( 73       ( 04 Oct 2021 00:31 )             27.8     10-    136  |  103  |  14  ----------------------------<  155<H>  4.1   |  21<L>  |  0.38<L>    Ca    9.3      04 Oct 2021 00:29  Phos  2.0     10-  Mg     2.1     10    TPro  7.2  /  Alb  3.9  /  TBili  1.4<H>  /  DBili  x   /  AST  23  /  ALT  20  /  AlkPhos  196<H>  10    LIVER FUNCTIONS - ( 04 Oct 2021 00:29 )  Alb: 3.9 g/dL / Pro: 7.2 g/dL / ALK PHOS: 196 U/L / ALT: 20 U/L / AST: 23 U/L / GGT: x           PT/INR - ( 04 Oct 2021 00:31 )   PT: 13.2 sec;   INR: 1.11 ratio         PTT - ( 04 Oct 2021 00:31 )  PTT:29.8 sec  ABG - ( 04 Oct 2021 00:22 )  pH, Arterial: 7.45  pH, Blood: x     /  pCO2: 33    /  pO2: 168   / HCO3: 23    / Base Excess: -0.7  /  SaO2: 99.9                ======================Micro/Rad/Cardio=================  Culture: Reviewed   CXR: Reviewed  Echo:Reviewed  ======================================================  PAST MEDICAL & SURGICAL HISTORY:  CHF (congestive heart failure)    CAD (coronary artery disease)    Depression    Pleural effusion    History of 2019 novel coronavirus disease (COVID-19)  2020    Hemorrhoids    Bleeding hemorrhoids    Peripheral arterial disease    Claudication    BPH with urinary obstruction    ACC/AHA stage D systolic heart failure    Anticoagulation goal of INR 2.0 to 2.5    Falls    Clavicle fracture    CKD (chronic kidney disease), stage III    Iron deficiency anemia    H/O epistaxis    Vertigo    GI bleed    S/P TVR (tricuspid valve repair)    S/P ventricular assist device    S/P endoscopy      ====================ASSESSMENT ==============  Stage D Nonischemic Cardiomyopathy, Status Post HM2 on 2017    Cardiogenic shock  Hemodynamic instability   Acute hypoxemic respiratory failure s/p trach , decannulated on ; Reintubated on    GI bleed , Status Post Enteroscopy   Anemia, in setting of melena   Chronic Kidney Disease  Stress hyperglycemia   C.diff positive on    Hypovolemic shock  Septic shock  Leukocytosis  GB thickening/percholecystic s/p perc choley by IT   SMA stenosis  Serratia/citrobacter pneumonia   Stenotrophomonas pneumonia   Enterobacter pneumonia   Nasuea/vomiting  Deconditioning    Plan:  ====================== NEUROLOGY=====================  Sedated with IV Precedex for vent synchrony   Tylenol PRN for analgesia    acetaminophen    Suspension .. 650 milliGRAM(s) Enteral Tube every 6 hours PRN Mild Pain (1 - 3)  dexMEDEtomidine Infusion 1.5 MICROgram(s)/kG/Hr (23.1 mL/Hr) IV Continuous <Continuous>    ==================== RESPIRATORY======================  Acute hypoxemic respiratory failure s/p #8 shiley trach on ; decannulated on ; Reintubated on    Continue close monitoring of respiratory rate and breathing pattern, following of ABG’s with A-line monitoring, continuous pulse oximetry monitoring.   For trach today with ENT     Mechanical Ventilation:  Mode: AC/ CMV (Assist Control/ Continuous Mandatory Ventilation)  RR (machine): 20  TV (machine): 500  FiO2: 40  PEEP: 5  ITime: 1  MAP: 11  PIP: 25      ====================CARDIOVASCULAR==================  Stage D Nonischemic Cardiomyopathy, Status Post HM2 on 2017; LVAD settings 9200, flow 4.6  TTE : EF 12%, mild-mod AR, mild-mod TR, severe global LV systolic dysfxn, RV enlargement with decreased fxn, no obvious vegetations documented  monitor MAPs  Atrial tachycardia recently, s/p Mg and K supplementation    ===================HEMATOLOGIC/ONC ===================  CTA A/P on  +L RP hematoma   Acute blood loss anemia, monitor H&H/Plts    Thrombocytopenia, possible in setting of sepsis, now starting to trend up, 57->73; Rest of labs not consistent with DIC  Holding coumadin and ASA given recurrent GI bleeding, continue monitoring LDH     ===================== RENAL =========================  Continue monitoring urine output, I&OS, BUN/Cr   Euvolemic, even fluid balance, currently off diuretics.    Replete lytes PRN. Keep K> 4 and Mg >2    ==================== GASTROINTESTINAL===================  NPO for trach   CTA A/P : Evaluation of the mesenteric vessels is limited by streak artifact from LVAD. There appears to be severe stenosis of the proximal SMA; abdominal mesenteric doppler is recommended for further evaluation. 2.  No small bowel findings to suggest acute mesenteric ischemia. 3.  Focal dissections involving the right and left common iliac arteries.  Mesenteric duplex on 8/15 borderline stenosis of proximal SMA, s/p failed SMA stent, L fem RP hematoma on 9/10; No intervention for cholecystostomy tube/PEJ at this time   Reglan for gut motility    multivitamin 1 Tablet(s) Oral daily  GI prophylaxis, pantoprazole  Injectable 40 milliGRAM(s) IV Push every 12 hours  simethicone 80 milliGRAM(s) Chew every 8 hours PRN Gas  sodium chloride 0.9%. 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       Per endocrine      - c/w Methimazole, adjust based on labs        - Check Free T4 and total T3     insulin lispro (ADMELOG) corrective regimen sliding scale   SubCutaneous every 6 hours  methimazole 20 milliGRAM(s) Oral <User Schedule>    ========================INFECTIOUS DISEASE================  Temp 99.5F, WBC rising 19.50->20.51   Continue trending WBC and monitoring fever curve   CT C/A/P : progressive ISABELLE opacities and L consolidations, multifocal pneumonia  SCx  +Enterobacter cloacae complex, BCx 10/1 +Enterobacter cloacae complex, c/w Meropenem   Vancomycin solution for C.diff prophylaxis.       meropenem  IVPB 1000 milliGRAM(s) IV Intermittent every 8 hours  vancomycin    Solution 125 milliGRAM(s) Oral every 12 hours      Patient requires continuous monitoring with bedside rhythm monitoring, pulse ox monitoring, and intermittent blood gas analysis. Care plan discussed with ICU care team. Patient remained critical and at risk for life threatening decompensation.     By signing my name below, I, Agnieszka Cehrry, attest that this documentation has been prepared under the direction and in the presence of CLAUDIA Goldstein   Electronically signed: Cesia Shaffer, 10-04-21 @ 09:05    I, Cristóbal Cisneros, personally performed the services described in this documentation. all medical record entries made by the luisibe were at my direction and in my presence. I have reviewed the chart and agree that the record reflects my personal performance and is accurate and complete  Electronically signed: CLAUDIA Goldstein        RICKY HAMPTON  MRN-[id number]  Patient is a 66y old  Male who presents with a chief complaint of Anemia, Supratherapeutic INR, Dark Stools (03 Oct 2021 20:13)    HPI:  65M PMH ACC/AHA stage D HF due to NICM HM2 LVAD , TV annuloplasty ring 17 as destination therapy due to severe peripheral artery disease with significant stenosis  SIADH, Depression, CKD-3 with hyperkalemia, past E. coli UTIs, driveline drainage (21) and COVID-19 (back in 2020)  He was recently seen in clinic where he complained of abdominal pain and dark stools w constipation back in May. He presents to [facility] ER today weakness and fatigue, moderate and + Black stools for three days, on coumadin secondary to warfarin use in the setting of an LVAD. Patient has required transfusions for GIB in the past. Mostly recently back in 2021 pt had anemia with dark stools. No interventions was done at that time. However Last Endoscopy was done in 2020 (negative). Today labs show patient is anemic with H/H of 4.5/16.3,. INR is 8.84 MAP in the 90s, Temp 35.1. He denies any chest pain, shortness of breath, dizziness, abd pain, nausea or vomiting.       (2021 16:57)      Surgery/Hospital Course:   admit for melena w/ anemia, INR 8.84   6/15 Capsul study (+) for small bowel bleed, balloon endoscopy (old blood in prox ileum); post EGD - septic w/ L opacity, re-intubated for concern for aspiration, TTE (Mod MR, decrease biV w/ interventricular septum boweing towards R)   bronch    +C Diff    CT C/A/P: Fluid filled colon which may be 2/2 rapid transit. Small bilateral pleffs with associates. Compressive atelectasis New ISABELLE & LLL  parenchymal opacities, suspicious for pneumonia. Moderate stenosis in the proximal superior mesenteric artery.    #8 Shiley trach at bedside    LVAD speed increased to 9200   Bronch   TC since . Patient transferred to SDU.    INR today 2.64.  H&H 7.3/24 this AM.  Will repeat CBC at noon, and will send stool guaiac Patient with persistent abdominal tenderness, rate of tube feeds decreased.  No nausea/vomiting.     INR today 2.4. H&H 9.1/28.6 low flow overnight /N&V, refusing Tube feeds on D5 1/2 normal  @50 cc/hr   INR 2.69  H&H 7.7/.1 refusing Tube feeds on D5 1/2 normal  @50 cc/hr. This am + BM Melena Dr Oneill HF  aware- PRBC x1  GI team consulted -  NPO  plan on study in am-  D/w Dr Cadet Patient  to return to CTU for further management; 1PRBCS    Post op INR 2.2 today.  No bleeding. BC + for SM.  Pt is hypotensive requiring pressor and inotropic support.  ID follow up today on Cefepime will follow.   R PTC for PTX    CT C/A/P: sub q emphysema in R chest wall, GGO RUL, small ascites CTH negative; Abd US: GB thickening, pericholecystic fluid     Perchole drain in place continues to drain total output overnight 133.  Fever today 38.8    duplex LE negative    Patient with persistent abdominal pain, refusing tube feeds and medications, Psych consulted   CTA A/P ordered to r/o mesenteric ischemia 2/2 persistent anorexia, nausea, vomiting. Revealed:  Evaluation of the mesenteric vessels is limited by streak artifact from LVAD. There appears to be severe stenosis of the proximal SMA; abdominal mesenteric doppler is recommended for further evaluation. 2.  No small bowel findings to suggest acute mesenteric ischemia. 3.  Focal dissections involving the right and left common iliac arteries.  8/15: Cultures resulted BC 1/2 +GPC in clusters, SC enterobacter; mesenteric duplex: borderline stenosis of proximal SMA  : CT C/A/P noncon: Nondular opacities in R lung apex w/cavitation, abd nl  :  Continue current care, treatment of thyrotoxicosis with medications as per endocrine, d/c ABX as per team.    RUQ sono: Contracted gallbladder with cholecystostomy tube in place.  9/10 failed SMA stent, L fem PSA, s/p 3U PRCB   CTA Abd/Pelvis L RP hematoma    Trach decannulated    intubated fro resp distress for increased WOB, LL pneumonia; CT C/A/P: progressive ISABELLE opacities and L consolidations, multifocal pneumonia    TTE (EF 25%, decreased RV, mod MR)   10/3 VT -> Lidocaine gtt     Today:  NPO for bedside trach placement today.     REVIEW OF SYSTEMS:  Unable to obtain, pt intubated and sedated     ICU Vital Signs Last 24 Hrs  T(C): 37.5 (04 Oct 2021 08:00), Max: 37.5 (04 Oct 2021 08:00)  T(F): 99.5 (04 Oct 2021 08:00), Max: 99.5 (04 Oct 2021 08:00)  HR: 94 (04 Oct 2021 09:00) (66 - 94)  BP: --  BP(mean): --  ABP: 77/72 (04 Oct 2021 09:00) (49/49 - 135/82)  ABP(mean): 75 (04 Oct 2021 09:00) (49 - 101)  RR: 20 (04 Oct 2021 09:00) (19 - 41)  SpO2: 100% (04 Oct 2021 09:00) (97% - 100%)      Physical Exam:  Gen:  intubated, NAD  CNS: moves all extremities to command on low dose sedation   Neck: no JVD, gauze over trach wound  RES : coarse breath sounds, no wheezing    CVS: +LVAD hum   Abd: Soft. Sybil drain with bilious fluid. Positive BS throughout. Mild RUQ abdominal tenderness by perc sybil.  : Landrum intact draining urine without hematuria.  Skin: No rash, erythema, cyanosis.  Vasc: Warm and well-perfused.  Ext:  no edema    ============================I/O===========================   I&O's Detail    03 Oct 2021 07:01  -  04 Oct 2021 07:00  --------------------------------------------------------  IN:    Albumin 5%  - 250 mL: 750 mL    Dexmedetomidine: 369.6 mL    Enteral Tube Flush: 110 mL    IV PiggyBack: 450 mL    Lidocaine: 135 mL    Lidocaine: 15 mL    Miscellaneous Tube Feedin mL    sodium chloride 0.9%: 240 mL  Total IN: 2709.6 mL    OUT:    Drain (mL): 150 mL    Indwelling Catheter - Urethral (mL): 2045 mL    Vasopressin: 0 mL  Total OUT: 2195 mL    Total NET: 514.6 mL      04 Oct 2021 07:01  -  04 Oct 2021 09:05  --------------------------------------------------------  IN:    Enteral Tube Flush: 20 mL    sodium chloride 0.9%: 20 mL  Total IN: 40 mL    OUT:    Dexmedetomidine: 0 mL    Drain (mL): 30 mL    Indwelling Catheter - Urethral (mL): 190 mL    Lidocaine: 0 mL    Miscellaneous Tube Feedin mL  Total OUT: 220 mL    Total NET: -180 mL        ============================ LABS =========================                        8.8    20.51 )-----------( 73       ( 04 Oct 2021 00:31 )             27.8     10    136  |  103  |  14  ----------------------------<  155<H>  4.1   |  21<L>  |  0.38<L>    Ca    9.3      04 Oct 2021 00:29  Phos  2.0     10-  Mg     2.1     10    TPro  7.2  /  Alb  3.9  /  TBili  1.4<H>  /  DBili  x   /  AST  23  /  ALT  20  /  AlkPhos  196<H>  10    LIVER FUNCTIONS - ( 04 Oct 2021 00:29 )  Alb: 3.9 g/dL / Pro: 7.2 g/dL / ALK PHOS: 196 U/L / ALT: 20 U/L / AST: 23 U/L / GGT: x           PT/INR - ( 04 Oct 2021 00:31 )   PT: 13.2 sec;   INR: 1.11 ratio         PTT - ( 04 Oct 2021 00:31 )  PTT:29.8 sec  ABG - ( 04 Oct 2021 00:22 )  pH, Arterial: 7.45  pH, Blood: x     /  pCO2: 33    /  pO2: 168   / HCO3: 23    / Base Excess: -0.7  /  SaO2: 99.9                ======================Micro/Rad/Cardio=================  Culture: Reviewed   CXR: Reviewed  Echo:Reviewed  ======================================================  PAST MEDICAL & SURGICAL HISTORY:  CHF (congestive heart failure)    CAD (coronary artery disease)    Depression    Pleural effusion    History of 2019 novel coronavirus disease (COVID-19)  2020    Hemorrhoids    Bleeding hemorrhoids    Peripheral arterial disease    Claudication    BPH with urinary obstruction    ACC/AHA stage D systolic heart failure    Anticoagulation goal of INR 2.0 to 2.5    Falls    Clavicle fracture    CKD (chronic kidney disease), stage III    Iron deficiency anemia    H/O epistaxis    Vertigo    GI bleed    S/P TVR (tricuspid valve repair)    S/P ventricular assist device    S/P endoscopy      ====================ASSESSMENT ==============  Stage D Nonischemic Cardiomyopathy, Status Post HM2 on 2017    Cardiogenic shock  Hemodynamic instability   Acute hypoxemic respiratory failure s/p trach , decannulated on ; Reintubated on    GI bleed , Status Post Enteroscopy   Anemia, in setting of melena   Chronic Kidney Disease  Stress hyperglycemia   C.diff positive on    Hypovolemic shock  Septic shock  Leukocytosis  GB thickening/percholecystic s/p perc choley by IT   SMA stenosis  Serratia/citrobacter pneumonia   Stenotrophomonas pneumonia   Enterobacter pneumonia   Nasuea/vomiting  Deconditioning    Plan:  ====================== NEUROLOGY=====================  Sedated with IV Precedex for vent synchrony   Tylenol PRN for analgesia    acetaminophen    Suspension .. 650 milliGRAM(s) Enteral Tube every 6 hours PRN Mild Pain (1 - 3)  dexMEDEtomidine Infusion 1.5 MICROgram(s)/kG/Hr (23.1 mL/Hr) IV Continuous <Continuous>    ==================== RESPIRATORY======================  Acute hypoxemic respiratory failure s/p #8 shiley trach on ; decannulated on ; Reintubated on    Continue close monitoring of respiratory rate and breathing pattern, following of ABG’s with A-line monitoring, continuous pulse oximetry monitoring.   For trach today with ENT     Mechanical Ventilation:  Mode: AC/ CMV (Assist Control/ Continuous Mandatory Ventilation)  RR (machine): 20  TV (machine): 500  FiO2: 40  PEEP: 5  ITime: 1  MAP: 11  PIP: 25      ====================CARDIOVASCULAR==================  Stage D Nonischemic Cardiomyopathy, Status Post HM2 on 2017; LVAD settings 9200, flow 4.6  TTE : EF 12%, mild-mod AR, mild-mod TR, severe global LV systolic dysfxn, RV enlargement with decreased fxn, no obvious vegetations documented  monitor MAPs  Atrial tachycardia yesterday, s/p Mg and K supplementation    ===================HEMATOLOGIC/ONC ===================  CTA A/P on  +L RP hematoma   Acute blood loss anemia, monitor H&H/Plts    Thrombocytopenia, possible in setting of sepsis, now starting to trend up, 57->73; Rest of labs not consistent with DIC  Holding coumadin and ASA given recurrent GI bleeding, continue monitoring LDH     ===================== RENAL =========================  Continue monitoring urine output, I&OS, BUN/Cr   Euvolemic, even fluid balance, currently off diuretics.    Replete lytes PRN. Keep K> 4 and Mg >2    ==================== GASTROINTESTINAL===================  NPO for trach   CTA A/P : Evaluation of the mesenteric vessels is limited by streak artifact from LVAD. There appears to be severe stenosis of the proximal SMA; abdominal mesenteric doppler is recommended for further evaluation. 2.  No small bowel findings to suggest acute mesenteric ischemia. 3.  Focal dissections involving the right and left common iliac arteries.  Mesenteric duplex on 8/15 borderline stenosis of proximal SMA, s/p failed SMA stent, L fem RP hematoma on 9/10; No intervention for cholecystostomy tube/PEJ at this time   Reglan for gut motility    multivitamin 1 Tablet(s) Oral daily  GI prophylaxis, pantoprazole  Injectable 40 milliGRAM(s) IV Push every 12 hours  simethicone 80 milliGRAM(s) Chew every 8 hours PRN Gas  sodium chloride 0.9%. 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       Per endocrine      - c/w Methimazole, adjust based on labs        - Check Free T4 and total T3     insulin lispro (ADMELOG) corrective regimen sliding scale   SubCutaneous every 6 hours  methimazole 20 milliGRAM(s) Oral <User Schedule>    ========================INFECTIOUS DISEASE================  Temp 99.5F, WBC rising 19.50->20.51   Continue trending WBC and monitoring fever curve   CT C/A/P : progressive ISABELLE opacities and L consolidations, multifocal pneumonia  SCx  +Enterobacter cloacae complex, BCx 10/1 +Enterobacter cloacae complex, c/w Meropenem   Vancomycin solution for C.diff prophylaxis.       meropenem  IVPB 1000 milliGRAM(s) IV Intermittent every 8 hours  vancomycin    Solution 125 milliGRAM(s) Oral every 12 hours      Patient requires continuous monitoring with bedside rhythm monitoring, pulse ox monitoring, and intermittent blood gas analysis. Care plan discussed with ICU care team. Patient remained critical and at risk for life threatening decompensation.     By signing my name below, I, Agnieszka hCerry, attest that this documentation has been prepared under the direction and in the presence of CLAUDIA Goldstein   Electronically signed: Cesia Shaffer, 10-04-21 @ 09:05    I, Cristóbal Cisneros, personally performed the services described in this documentation. all medical record entries made by the luisibmel were at my direction and in my presence. I have reviewed the chart and agree that the record reflects my personal performance and is accurate and complete  Electronically signed: CLAUDIA Goldstein        RICKY HAMPTON  MRN-[id number]  Patient is a 66y old  Male who presents with a chief complaint of Anemia, Supratherapeutic INR, Dark Stools (03 Oct 2021 20:13)    HPI:  65M PMH ACC/AHA stage D HF due to NICM HM2 LVAD , TV annuloplasty ring 17 as destination therapy due to severe peripheral artery disease with significant stenosis  SIADH, Depression, CKD-3 with hyperkalemia, past E. coli UTIs, driveline drainage (21) and COVID-19 (back in 2020)  He was recently seen in clinic where he complained of abdominal pain and dark stools w constipation back in May. He presents to [facility] ER today weakness and fatigue, moderate and + Black stools for three days, on coumadin secondary to warfarin use in the setting of an LVAD. Patient has required transfusions for GIB in the past. Mostly recently back in 2021 pt had anemia with dark stools. No interventions was done at that time. However Last Endoscopy was done in 2020 (negative). Today labs show patient is anemic with H/H of 4.5/16.3,. INR is 8.84 MAP in the 90s, Temp 35.1. He denies any chest pain, shortness of breath, dizziness, abd pain, nausea or vomiting.       (2021 16:57)      Surgery/Hospital Course:   admit for melena w/ anemia, INR 8.84   6/15 Capsul study (+) for small bowel bleed, balloon endoscopy (old blood in prox ileum); post EGD - septic w/ L opacity, re-intubated for concern for aspiration, TTE (Mod MR, decrease biV w/ interventricular septum boweing towards R)   bronch    +C Diff    CT C/A/P: Fluid filled colon which may be 2/2 rapid transit. Small bilateral pleffs with associates. Compressive atelectasis New ISABELLE & LLL  parenchymal opacities, suspicious for pneumonia. Moderate stenosis in the proximal superior mesenteric artery.    #8 Shiley trach at bedside    LVAD speed increased to 9200   Bronch   TC since . Patient transferred to SDU.    INR today 2.64.  H&H 7.3/24 this AM.  Will repeat CBC at noon, and will send stool guaiac Patient with persistent abdominal tenderness, rate of tube feeds decreased.  No nausea/vomiting.     INR today 2.4. H&H 9.1/28.6 low flow overnight /N&V, refusing Tube feeds on D5 1/2 normal  @50 cc/hr   INR 2.69  H&H 7.7/.1 refusing Tube feeds on D5 1/2 normal  @50 cc/hr. This am + BM Melena Dr Oneill HF  aware- PRBC x1  GI team consulted -  NPO  plan on study in am-  D/w Dr Cadet Patient  to return to CTU for further management; 1PRBCS    Post op INR 2.2 today.  No bleeding. BC + for SM.  Pt is hypotensive requiring pressor and inotropic support.  ID follow up today on Cefepime will follow.   R PTC for PTX    CT C/A/P: sub q emphysema in R chest wall, GGO RUL, small ascites CTH negative; Abd US: GB thickening, pericholecystic fluid     Perchole drain in place continues to drain total output overnight 133.  Fever today 38.8    duplex LE negative    Patient with persistent abdominal pain, refusing tube feeds and medications, Psych consulted   CTA A/P ordered to r/o mesenteric ischemia 2/2 persistent anorexia, nausea, vomiting. Revealed:  Evaluation of the mesenteric vessels is limited by streak artifact from LVAD. There appears to be severe stenosis of the proximal SMA; abdominal mesenteric doppler is recommended for further evaluation. 2.  No small bowel findings to suggest acute mesenteric ischemia. 3.  Focal dissections involving the right and left common iliac arteries.  8/15: Cultures resulted BC 1/2 +GPC in clusters, SC enterobacter; mesenteric duplex: borderline stenosis of proximal SMA  : CT C/A/P noncon: Nondular opacities in R lung apex w/cavitation, abd nl  :  Continue current care, treatment of thyrotoxicosis with medications as per endocrine, d/c ABX as per team.    RUQ sono: Contracted gallbladder with cholecystostomy tube in place.  9/10 failed SMA stent, L fem PSA, s/p 3U PRCB   CTA Abd/Pelvis L RP hematoma    Trach decannulated    intubated fro resp distress for increased WOB, LL pneumonia; CT C/A/P: progressive ISABELLE opacities and L consolidations, multifocal pneumonia    TTE (EF 25%, decreased RV, mod MR)   10/3 VT -> Lidocaine gtt     Today:  - VT yesterday, s/p Lidocaine drip, weaned to off this AM  - NPO for bedside trach placement today with cardiac anesthesia; consent obtained by family   - BCx  + Serratia marcescens, BCx 10/1 +Enterobacter cloacae complex, plan for CT Abd to assess for source of infection ?later this week     REVIEW OF SYSTEMS:  Unable to obtain, pt intubated and sedated     ICU Vital Signs Last 24 Hrs  T(C): 37.5 (04 Oct 2021 08:00), Max: 37.5 (04 Oct 2021 08:00)  T(F): 99.5 (04 Oct 2021 08:00), Max: 99.5 (04 Oct 2021 08:00)  HR: 94 (04 Oct 2021 09:00) (66 - 94)  BP: --  BP(mean): --  ABP: 77/72 (04 Oct 2021 09:00) (49/49 - 135/82)  ABP(mean): 75 (04 Oct 2021 09:00) (49 - 101)  RR: 20 (04 Oct 2021 09:00) (19 - 41)  SpO2: 100% (04 Oct 2021 09:00) (97% - 100%)      Physical Exam:  Gen:  intubated, NAD  CNS: moves all extremities to command on low dose sedation   Neck: no JVD, gauze over trach wound  RES : coarse breath sounds, no wheezing    CVS: +LVAD hum   Abd: Soft. Sybil drain with bilious fluid. Positive BS throughout. Mild RUQ abdominal tenderness by perc sybil.  : Landrum intact draining urine without hematuria.  Skin: No rash, erythema, cyanosis.  Vasc: Warm and well-perfused.  Ext:  no edema    ============================I/O===========================   I&O's Detail    03 Oct 2021 07:01  -  04 Oct 2021 07:00  --------------------------------------------------------  IN:    Albumin 5%  - 250 mL: 750 mL    Dexmedetomidine: 369.6 mL    Enteral Tube Flush: 110 mL    IV PiggyBack: 450 mL    Lidocaine: 135 mL    Lidocaine: 15 mL    Miscellaneous Tube Feedin mL    sodium chloride 0.9%: 240 mL  Total IN: 2709.6 mL    OUT:    Drain (mL): 150 mL    Indwelling Catheter - Urethral (mL): 2045 mL    Vasopressin: 0 mL  Total OUT: 2195 mL    Total NET: 514.6 mL      04 Oct 2021 07:01  -  04 Oct 2021 09:05  --------------------------------------------------------  IN:    Enteral Tube Flush: 20 mL    sodium chloride 0.9%: 20 mL  Total IN: 40 mL    OUT:    Dexmedetomidine: 0 mL    Drain (mL): 30 mL    Indwelling Catheter - Urethral (mL): 190 mL    Lidocaine: 0 mL    Miscellaneous Tube Feedin mL  Total OUT: 220 mL    Total NET: -180 mL        ============================ LABS =========================                        8.8    20.51 )-----------( 73       ( 04 Oct 2021 00:31 )             27.8     10    136  |  103  |  14  ----------------------------<  155<H>  4.1   |  21<L>  |  0.38<L>    Ca    9.3      04 Oct 2021 00:29  Phos  2.0     10  Mg     2.1     10-04    TPro  7.2  /  Alb  3.9  /  TBili  1.4<H>  /  DBili  x   /  AST  23  /  ALT  20  /  AlkPhos  196<H>  10-04    LIVER FUNCTIONS - ( 04 Oct 2021 00:29 )  Alb: 3.9 g/dL / Pro: 7.2 g/dL / ALK PHOS: 196 U/L / ALT: 20 U/L / AST: 23 U/L / GGT: x           PT/INR - ( 04 Oct 2021 00:31 )   PT: 13.2 sec;   INR: 1.11 ratio         PTT - ( 04 Oct 2021 00:31 )  PTT:29.8 sec  ABG - ( 04 Oct 2021 00:22 )  pH, Arterial: 7.45  pH, Blood: x     /  pCO2: 33    /  pO2: 168   / HCO3: 23    / Base Excess: -0.7  /  SaO2: 99.9                ======================Micro/Rad/Cardio=================  Culture: Reviewed   CXR: Reviewed  Echo:Reviewed  ======================================================  PAST MEDICAL & SURGICAL HISTORY:  CHF (congestive heart failure)    CAD (coronary artery disease)    Depression    Pleural effusion    History of 2019 novel coronavirus disease (COVID-19)  2020    Hemorrhoids    Bleeding hemorrhoids    Peripheral arterial disease    Claudication    BPH with urinary obstruction    ACC/AHA stage D systolic heart failure    Anticoagulation goal of INR 2.0 to 2.5    Falls    Clavicle fracture    CKD (chronic kidney disease), stage III    Iron deficiency anemia    H/O epistaxis    Vertigo    GI bleed    S/P TVR (tricuspid valve repair)    S/P ventricular assist device    S/P endoscopy      ====================ASSESSMENT ==============  Stage D Nonischemic Cardiomyopathy, Status Post HM2 on 2017    Cardiogenic shock  Hemodynamic instability   Acute hypoxemic respiratory failure s/p trach , decannulated on ; Reintubated on    GI bleed , Status Post Enteroscopy 6/14  Anemia, in setting of melena   Chronic Kidney Disease  Stress hyperglycemia   C.diff positive on    Hypovolemic shock  Septic shock  Leukocytosis  GB thickening/percholecystic s/p perc choley by IT   SMA stenosis  Serratia/citrobacter pneumonia   Stenotrophomonas pneumonia   Enterobacter pneumonia   Nasuea/vomiting  Deconditioning    Plan:  ====================== NEUROLOGY=====================  Sedated with IV Precedex for vent synchrony   Tylenol PRN for analgesia    acetaminophen    Suspension .. 650 milliGRAM(s) Enteral Tube every 6 hours PRN Mild Pain (1 - 3)  dexMEDEtomidine Infusion 1.5 MICROgram(s)/kG/Hr (23.1 mL/Hr) IV Continuous <Continuous>    ==================== RESPIRATORY======================  Acute hypoxemic respiratory failure s/p #8 betzaida pritchard on ; decannulated on ; Reintubated on    Continue close monitoring of respiratory rate and breathing pattern, following of ABG’s with A-line monitoring, continuous pulse oximetry monitoring.   For trach today with ENT with cardiac anesthesia; consent obtained by family     Mechanical Ventilation:  Mode: AC/ CMV (Assist Control/ Continuous Mandatory Ventilation)  RR (machine): 20  TV (machine): 500  FiO2: 40  PEEP: 5  ITime: 1  MAP: 11  PIP: 25      ====================CARDIOVASCULAR==================  Stage D Nonischemic Cardiomyopathy, Status Post HM2 on 2017; LVAD settings 9200, flow 4.6  TTE : EF 12%, mild-mod AR, mild-mod TR, severe global LV systolic dysfxn, RV enlargement with decreased fxn, no obvious vegetations documented  monitor MAPs  VT yesterday, s/p Lidocaine drip, weaned to off this AM  Holding off on Propranolol for now     ===================HEMATOLOGIC/ONC ===================  CTA A/P on  +L RP hematoma   Acute blood loss anemia, monitor H&H/Plts    Thrombocytopenia, possible in setting of sepsis, now starting to trend up, 57->73; Rest of labs not consistent with DIC  Holding coumadin and ASA given recurrent GI bleeding, continue monitoring LDH     ===================== RENAL =========================  Continue monitoring urine output, I&OS, BUN/Cr   Euvolemic, even fluid balance, currently off diuretics.    Replete lytes PRN. Keep K> 4 and Mg >2    ==================== GASTROINTESTINAL===================  NPO for trach   CTA A/P : Evaluation of the mesenteric vessels is limited by streak artifact from LVAD. There appears to be severe stenosis of the proximal SMA; abdominal mesenteric doppler is recommended for further evaluation. 2.  No small bowel findings to suggest acute mesenteric ischemia. 3.  Focal dissections involving the right and left common iliac arteries.  Mesenteric duplex on 8/15 borderline stenosis of proximal SMA, s/p failed SMA stent, L fem RP hematoma on 9/10; No intervention for cholecystostomy tube/PEJ at this time   Reglan for gut motility    multivitamin 1 Tablet(s) Oral daily  GI prophylaxis, pantoprazole  Injectable 40 milliGRAM(s) IV Push every 12 hours  simethicone 80 milliGRAM(s) Chew every 8 hours PRN Gas  sodium chloride 0.9%. 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       Per endocrine      - c/w Methimazole, adjust based on labs        - Check Free T4 and total T3     insulin lispro (ADMELOG) corrective regimen sliding scale   SubCutaneous every 6 hours  methimazole 20 milliGRAM(s) Oral <User Schedule>    ========================INFECTIOUS DISEASE================  Temp 99.5F, WBC rising 19.50->20.51   Continue trending WBC and monitoring fever curve   CT C/A/P : progressive ISABELLE opacities and L consolidations, multifocal pneumonia  BCx  + Serratia marcescens, BCx 10/1 +Enterobacter cloacae complex, c/w Meropenem   Plan for CT Abd to assess for source of infection ?later this week   Vancomycin solution for C.diff prophylaxis.       meropenem  IVPB 1000 milliGRAM(s) IV Intermittent every 8 hours  vancomycin    Solution 125 milliGRAM(s) Oral every 12 hours      Patient requires continuous monitoring with bedside rhythm monitoring, pulse ox monitoring, and intermittent blood gas analysis. Care plan discussed with ICU care team. Patient remained critical and at risk for life threatening decompensation.     By signing my name below, I, Agnieszka Cherry, attest that this documentation has been prepared under the direction and in the presence of CLAUDIA Goldstein   Electronically signed: Romel Shaffer, 10-04-21 @ 09:05    I, Cristóbal Cisneros, personally performed the services described in this documentation. all medical record entries made by the romel were at my direction and in my presence. I have reviewed the chart and agree that the record reflects my personal performance and is accurate and complete  Electronically signed: CLAUDIA Goldstein        RICKY HAMPTON  MRN-[id number]  Patient is a 66y old  Male who presents with a chief complaint of Anemia, Supratherapeutic INR, Dark Stools (03 Oct 2021 20:13)    HPI:  65M PMH ACC/AHA stage D HF due to NICM HM2 LVAD , TV annuloplasty ring 17 as destination therapy due to severe peripheral artery disease with significant stenosis  SIADH, Depression, CKD-3 with hyperkalemia, past E. coli UTIs, driveline drainage (21) and COVID-19 (back in 2020)  He was recently seen in clinic where he complained of abdominal pain and dark stools w constipation back in May. He presents to [facility] ER today weakness and fatigue, moderate and + Black stools for three days, on coumadin secondary to warfarin use in the setting of an LVAD. Patient has required transfusions for GIB in the past. Mostly recently back in 2021 pt had anemia with dark stools. No interventions was done at that time. However Last Endoscopy was done in 2020 (negative). Today labs show patient is anemic with H/H of 4.5/16.3,. INR is 8.84 MAP in the 90s, Temp 35.1. He denies any chest pain, shortness of breath, dizziness, abd pain, nausea or vomiting.       (2021 16:57)      Surgery/Hospital Course:   admit for melena w/ anemia, INR 8.84   6/15 Capsul study (+) for small bowel bleed, balloon endoscopy (old blood in prox ileum); post EGD - septic w/ L opacity, re-intubated for concern for aspiration, TTE (Mod MR, decrease biV w/ interventricular septum boweing towards R)   bronch    +C Diff    CT C/A/P: Fluid filled colon which may be 2/2 rapid transit. Small bilateral pleffs with associates. Compressive atelectasis New ISABELLE & LLL  parenchymal opacities, suspicious for pneumonia. Moderate stenosis in the proximal superior mesenteric artery.    #8 Shiley trach at bedside    LVAD speed increased to 9200   Bronch   TC since . Patient transferred to SDU.    INR today 2.64.  H&H 7.3/24 this AM.  Will repeat CBC at noon, and will send stool guaiac Patient with persistent abdominal tenderness, rate of tube feeds decreased.  No nausea/vomiting.     INR today 2.4. H&H 9.1/28.6 low flow overnight /N&V, refusing Tube feeds on D5 1/2 normal  @50 cc/hr   INR 2.69  H&H 7.7/.1 refusing Tube feeds on D5 1/2 normal  @50 cc/hr. This am + BM Melena Dr Oneill HF  aware- PRBC x1  GI team consulted -  NPO  plan on study in am-  D/w Dr Cadet Patient  to return to CTU for further management; 1PRBCS    Post op INR 2.2 today.  No bleeding. BC + for SM.  Pt is hypotensive requiring pressor and inotropic support.  ID follow up today on Cefepime will follow.   R PTC for PTX    CT C/A/P: sub q emphysema in R chest wall, GGO RUL, small ascites CTH negative; Abd US: GB thickening, pericholecystic fluid     Perchole drain in place continues to drain total output overnight 133.  Fever today 38.8    duplex LE negative    Patient with persistent abdominal pain, refusing tube feeds and medications, Psych consulted   CTA A/P ordered to r/o mesenteric ischemia 2/2 persistent anorexia, nausea, vomiting. Revealed:  Evaluation of the mesenteric vessels is limited by streak artifact from LVAD. There appears to be severe stenosis of the proximal SMA; abdominal mesenteric doppler is recommended for further evaluation. 2.  No small bowel findings to suggest acute mesenteric ischemia. 3.  Focal dissections involving the right and left common iliac arteries.  8/15: Cultures resulted BC 1/2 +GPC in clusters, SC enterobacter; mesenteric duplex: borderline stenosis of proximal SMA  : CT C/A/P noncon: Nondular opacities in R lung apex w/cavitation, abd nl  :  Continue current care, treatment of thyrotoxicosis with medications as per endocrine, d/c ABX as per team.    RUQ sono: Contracted gallbladder with cholecystostomy tube in place.  9/10 failed SMA stent, L fem PSA, s/p 3U PRCB   CTA Abd/Pelvis L RP hematoma    Trach decannulated    intubated fro resp distress for increased WOB, LL pneumonia; CT C/A/P: progressive ISABELLE opacities and L consolidations, multifocal pneumonia    TTE (EF 25%, decreased RV, mod MR)   10/3 VT -> Lidocaine gtt     Today:  - VT yesterday, s/p Lidocaine drip, weaned to off this AM as it can alter his mental status and he has been in SR. Given 2g of Mg as well  - NPO for bedside trach placement today with cardiac anesthesia; consent was obtained from the sister who is the HCP  - BCx  + Serratia marcescens, BCx 10/1 +Enterobacter cloacae complex, plan for CT Abd to assess for source of infection ?later this week     REVIEW OF SYSTEMS:  Unable to obtain, pt intubated and sedated     ICU Vital Signs Last 24 Hrs  T(C): 37.5 (04 Oct 2021 08:00), Max: 37.5 (04 Oct 2021 08:00)  T(F): 99.5 (04 Oct 2021 08:00), Max: 99.5 (04 Oct 2021 08:00)  HR: 94 (04 Oct 2021 09:00) (66 - 94)  BP: --  BP(mean): --  ABP: 77/72 (04 Oct 2021 09:00) (49/49 - 135/82)  ABP(mean): 75 (04 Oct 2021 09:00) (49 - 101)  RR: 20 (04 Oct 2021 09:00) (19 - 41)  SpO2: 100% (04 Oct 2021 09:00) (97% - 100%)      Physical Exam:  Gen:  intubated, NAD  CNS: moves all extremities to command on low dose sedation   Neck: no JVD, gauze over trach wound  RES : coarse breath sounds, no wheezing    CVS: +LVAD hum   Abd: Soft. Sybil drain with bilious fluid. Positive BS throughout. Mild RUQ abdominal tenderness by perc sybil.  : Landrum intact draining urine without hematuria.  Skin: No rash, erythema, cyanosis.  Vasc: Warm and well-perfused.  Ext:  no edema    ============================I/O===========================   I&O's Detail    03 Oct 2021 07:01  -  04 Oct 2021 07:00  --------------------------------------------------------  IN:    Albumin 5%  - 250 mL: 750 mL    Dexmedetomidine: 369.6 mL    Enteral Tube Flush: 110 mL    IV PiggyBack: 450 mL    Lidocaine: 135 mL    Lidocaine: 15 mL    Miscellaneous Tube Feedin mL    sodium chloride 0.9%: 240 mL  Total IN: 2709.6 mL    OUT:    Drain (mL): 150 mL    Indwelling Catheter - Urethral (mL): 2045 mL    Vasopressin: 0 mL  Total OUT: 2195 mL    Total NET: 514.6 mL      04 Oct 2021 07:01  -  04 Oct 2021 09:05  --------------------------------------------------------  IN:    Enteral Tube Flush: 20 mL    sodium chloride 0.9%: 20 mL  Total IN: 40 mL    OUT:    Dexmedetomidine: 0 mL    Drain (mL): 30 mL    Indwelling Catheter - Urethral (mL): 190 mL    Lidocaine: 0 mL    Miscellaneous Tube Feedin mL  Total OUT: 220 mL    Total NET: -180 mL        ============================ LABS =========================                        8.8    20.51 )-----------( 73       ( 04 Oct 2021 00:31 )             27.8     10    136  |  103  |  14  ----------------------------<  155<H>  4.1   |  21<L>  |  0.38<L>    Ca    9.3      04 Oct 2021 00:29  Phos  2.0     10-  Mg     2.1     10-    TPro  7.2  /  Alb  3.9  /  TBili  1.4<H>  /  DBili  x   /  AST  23  /  ALT  20  /  AlkPhos  196<H>  10-04    LIVER FUNCTIONS - ( 04 Oct 2021 00:29 )  Alb: 3.9 g/dL / Pro: 7.2 g/dL / ALK PHOS: 196 U/L / ALT: 20 U/L / AST: 23 U/L / GGT: x           PT/INR - ( 04 Oct 2021 00:31 )   PT: 13.2 sec;   INR: 1.11 ratio         PTT - ( 04 Oct 2021 00:31 )  PTT:29.8 sec  ABG - ( 04 Oct 2021 00:22 )  pH, Arterial: 7.45  pH, Blood: x     /  pCO2: 33    /  pO2: 168   / HCO3: 23    / Base Excess: -0.7  /  SaO2: 99.9                ======================Micro/Rad/Cardio=================  Culture: Reviewed   CXR: Reviewed  Echo:Reviewed  ======================================================  PAST MEDICAL & SURGICAL HISTORY:  CHF (congestive heart failure)    CAD (coronary artery disease)    Depression    Pleural effusion    History of 2019 novel coronavirus disease (COVID-19)  2020    Hemorrhoids    Bleeding hemorrhoids    Peripheral arterial disease    Claudication    BPH with urinary obstruction    ACC/AHA stage D systolic heart failure    Anticoagulation goal of INR 2.0 to 2.5    Falls    Clavicle fracture    CKD (chronic kidney disease), stage III    Iron deficiency anemia    H/O epistaxis    Vertigo    GI bleed    S/P TVR (tricuspid valve repair)    S/P ventricular assist device    S/P endoscopy      ====================ASSESSMENT ==============  Stage D Nonischemic Cardiomyopathy, Status Post HM2 on 2017    Cardiogenic shock  Hemodynamic instability   Acute hypoxemic respiratory failure s/p trach , decannulated on ; Reintubated on    GI bleed , Status Post Enteroscopy   Anemia, in setting of melena   Chronic Kidney Disease  Stress hyperglycemia   C.diff positive on    Hypovolemic shock  Septic shock  Leukocytosis  GB thickening/percholecystic s/p perc choley by IT   SMA stenosis  Serratia/citrobacter pneumonia   Stenotrophomonas pneumonia   Enterobacter pneumonia   Nasuea/vomiting  Deconditioning    Plan:  ====================== NEUROLOGY=====================  Sedated with IV Precedex for vent synchrony   Tylenol PRN for analgesia    acetaminophen    Suspension .. 650 milliGRAM(s) Enteral Tube every 6 hours PRN Mild Pain (1 - 3)  dexMEDEtomidine Infusion 1.5 MICROgram(s)/kG/Hr (23.1 mL/Hr) IV Continuous <Continuous>    ==================== RESPIRATORY======================  Acute hypoxemic respiratory failure s/p #8 betzaida trach on ; decannulated on ; Reintubated on    Continue close monitoring of respiratory rate and breathing pattern, following of ABG’s with A-line monitoring, continuous pulse oximetry monitoring.   For trach today with ENT with cardiac anesthesia; consent obtained by family     Mechanical Ventilation:  Mode: AC/ CMV (Assist Control/ Continuous Mandatory Ventilation)  RR (machine): 20  TV (machine): 500  FiO2: 40  PEEP: 5  ITime: 1  MAP: 11  PIP: 25      ====================CARDIOVASCULAR==================  Stage D Nonischemic Cardiomyopathy, Status Post HM2 on 2017; LVAD settings 9200, flow 4.6  TTE : EF 12%, mild-mod AR, mild-mod TR, severe global LV systolic dysfxn, RV enlargement with decreased fxn, no obvious vegetations documented  monitor MAPs  VT yesterday, s/p Lidocaine drip, weaned to off this AM  Holding off on Propranolol for now     ===================HEMATOLOGIC/ONC ===================  CTA A/P on  +L RP hematoma   Acute blood loss anemia, monitor H&H/Plts    Thrombocytopenia, possible in setting of sepsis, now starting to trend up, 57->73; Rest of labs not consistent with DIC  Holding coumadin and ASA given recurrent GI bleeding, continue monitoring LDH     ===================== RENAL =========================  Continue monitoring urine output, I&OS, BUN/Cr   Euvolemic, even fluid balance, currently off diuretics.    Replete lytes PRN. Keep K> 4 and Mg >2    ==================== GASTROINTESTINAL===================  NPO for trach   CTA A/P : Evaluation of the mesenteric vessels is limited by streak artifact from LVAD. There appears to be severe stenosis of the proximal SMA; abdominal mesenteric doppler is recommended for further evaluation. 2.  No small bowel findings to suggest acute mesenteric ischemia. 3.  Focal dissections involving the right and left common iliac arteries.  Mesenteric duplex on 8/15 borderline stenosis of proximal SMA, s/p failed SMA stent, L fem RP hematoma on 9/10; No intervention for cholecystostomy tube/PEJ at this time   Reglan for gut motility    multivitamin 1 Tablet(s) Oral daily  GI prophylaxis, pantoprazole  Injectable 40 milliGRAM(s) IV Push every 12 hours  simethicone 80 milliGRAM(s) Chew every 8 hours PRN Gas  sodium chloride 0.9%. 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       Per endocrine      - c/w Methimazole, adjust based on labs        - Check Free T4 and total T3     insulin lispro (ADMELOG) corrective regimen sliding scale   SubCutaneous every 6 hours  methimazole 20 milliGRAM(s) Oral <User Schedule>    ========================INFECTIOUS DISEASE================  Temp 99.5F, WBC rising 19.50->20.51   Continue trending WBC and monitoring fever curve   CT C/A/P : progressive ISABELLE opacities and L consolidations, multifocal pneumonia  BCx  + Serratia marcescens, BCx 10/1 +Enterobacter cloacae complex, c/w Meropenem   Plan for CT Abd to assess for source of infection ?later this week   Vancomycin solution for C.diff prophylaxis.       meropenem  IVPB 1000 milliGRAM(s) IV Intermittent every 8 hours  vancomycin    Solution 125 milliGRAM(s) Oral every 12 hours      Patient requires continuous monitoring with bedside rhythm monitoring, pulse ox monitoring, and intermittent blood gas analysis. Care plan discussed with ICU care team. Patient remained critical and at risk for life threatening decompensation.     By signing my name below, I, Agnieszka Cherry, attest that this documentation has been prepared under the direction and in the presence of CLAUDIA Goldstein   Electronically signed: Cesia Shaffer, 10-04-21 @ 09:05    I, Cristóbal Cisneros, personally performed the services described in this documentation. all medical record entries made by the luisibmel were at my direction and in my presence. I have reviewed the chart and agree that the record reflects my personal performance and is accurate and complete  Electronically signed: CLAUDIA Goldstein        RICKY HAMPTON  MRN-[id number]  Patient is a 66y old  Male who presents with a chief complaint of Anemia, Supratherapeutic INR, Dark Stools (03 Oct 2021 20:13)    HPI:  65M PMH ACC/AHA stage D HF due to NICM HM2 LVAD , TV annuloplasty ring 17 as destination therapy due to severe peripheral artery disease with significant stenosis  SIADH, Depression, CKD-3 with hyperkalemia, past E. coli UTIs, driveline drainage (21) and COVID-19 (back in 2020)  He was recently seen in clinic where he complained of abdominal pain and dark stools w constipation back in May. He presents to [facility] ER today weakness and fatigue, moderate and + Black stools for three days, on coumadin secondary to warfarin use in the setting of an LVAD. Patient has required transfusions for GIB in the past. Mostly recently back in 2021 pt had anemia with dark stools. No interventions was done at that time. However Last Endoscopy was done in 2020 (negative). Today labs show patient is anemic with H/H of 4.5/16.3,. INR is 8.84 MAP in the 90s, Temp 35.1. He denies any chest pain, shortness of breath, dizziness, abd pain, nausea or vomiting.       (2021 16:57)      Surgery/Hospital Course:   admit for melena w/ anemia, INR 8.84   6/15 Capsul study (+) for small bowel bleed, balloon endoscopy (old blood in prox ileum); post EGD - septic w/ L opacity, re-intubated for concern for aspiration, TTE (Mod MR, decrease biV w/ interventricular septum boweing towards R)   bronch    +C Diff    CT C/A/P: Fluid filled colon which may be 2/2 rapid transit. Small bilateral pleffs with associates. Compressive atelectasis New ISABELLE & LLL  parenchymal opacities, suspicious for pneumonia. Moderate stenosis in the proximal superior mesenteric artery.    #8 Shiley trach at bedside    LVAD speed increased to 9200   Bronch   TC since . Patient transferred to SDU.    INR today 2.64.  H&H 7.3/24 this AM.  Will repeat CBC at noon, and will send stool guaiac Patient with persistent abdominal tenderness, rate of tube feeds decreased.  No nausea/vomiting.     INR today 2.4. H&H 9.1/28.6 low flow overnight /N&V, refusing Tube feeds on D5 1/2 normal  @50 cc/hr   INR 2.69  H&H 7.7/.1 refusing Tube feeds on D5 1/2 normal  @50 cc/hr. This am + BM Melena Dr Oneill HF  aware- PRBC x1  GI team consulted -  NPO  plan on study in am-  D/w Dr Cadet Patient  to return to CTU for further management; 1PRBCS    Post op INR 2.2 today.  No bleeding. BC + for SM.  Pt is hypotensive requiring pressor and inotropic support.  ID follow up today on Cefepime will follow.   R PTC for PTX    CT C/A/P: sub q emphysema in R chest wall, GGO RUL, small ascites CTH negative; Abd US: GB thickening, pericholecystic fluid     Perchole drain in place continues to drain total output overnight 133.  Fever today 38.8    duplex LE negative    Patient with persistent abdominal pain, refusing tube feeds and medications, Psych consulted   CTA A/P ordered to r/o mesenteric ischemia 2/2 persistent anorexia, nausea, vomiting. Revealed:  Evaluation of the mesenteric vessels is limited by streak artifact from LVAD. There appears to be severe stenosis of the proximal SMA; abdominal mesenteric doppler is recommended for further evaluation. 2.  No small bowel findings to suggest acute mesenteric ischemia. 3.  Focal dissections involving the right and left common iliac arteries.  8/15: Cultures resulted BC 1/2 +GPC in clusters, SC enterobacter; mesenteric duplex: borderline stenosis of proximal SMA  : CT C/A/P noncon: Nondular opacities in R lung apex w/cavitation, abd nl  :  Continue current care, treatment of thyrotoxicosis with medications as per endocrine, d/c ABX as per team.    RUQ sono: Contracted gallbladder with cholecystostomy tube in place.  9/10 failed SMA stent, L fem PSA, s/p 3U PRCB   CTA Abd/Pelvis L RP hematoma    Trach decannulated    intubated fro resp distress for increased WOB, LL pneumonia; CT C/A/P: progressive ISABELLE opacities and L consolidations, multifocal pneumonia    TTE (EF 25%, decreased RV, mod MR)   10/3 VT -> Lidocaine gtt     Today:  - VT yesterday, s/p Lidocaine drip, weaned to off this AM as it can alter his mental status and he has been in SR. Given 2g of Mg as well  - TTE showed no pericardial effusion  - NPO for bedside trach placement today with cardiac anesthesia; consent was obtained from the sister who is the HCP  - BCx  + Serratia marcescens, BCx 10/1 +Enterobacter cloacae complex, is sensitive to Meropenem, will continue    REVIEW OF SYSTEMS:  Unable to obtain, pt intubated and sedated     ICU Vital Signs Last 24 Hrs  T(C): 37.5 (04 Oct 2021 08:00), Max: 37.5 (04 Oct 2021 08:00)  T(F): 99.5 (04 Oct 2021 08:00), Max: 99.5 (04 Oct 2021 08:00)  HR: 94 (04 Oct 2021 09:00) (66 - 94)  BP: --  BP(mean): --  ABP: 77/72 (04 Oct 2021 09:00) (49/49 - 135/82)  ABP(mean): 75 (04 Oct 2021 09:00) (49 - 101)  RR: 20 (04 Oct 2021 09:00) (19 - 41)  SpO2: 100% (04 Oct 2021 09:00) (97% - 100%)      Physical Exam:  Gen:  intubated, NAD  CNS: moves all extremities to command on low dose sedation   Neck: no JVD, gauze over trach wound  RES : coarse breath sounds, no wheezing    CVS: +LVAD hum   Abd: Soft. Sybil drain with bilious fluid. Positive BS throughout. Mild RUQ abdominal tenderness by perc sybil.  : Landrum intact draining urine without hematuria.  Skin: No rash, erythema, cyanosis.  Vasc: Warm and well-perfused.  Ext:  no edema    ============================I/O===========================   I&O's Detail    03 Oct 2021 07:01  -  04 Oct 2021 07:00  --------------------------------------------------------  IN:    Albumin 5%  - 250 mL: 750 mL    Dexmedetomidine: 369.6 mL    Enteral Tube Flush: 110 mL    IV PiggyBack: 450 mL    Lidocaine: 135 mL    Lidocaine: 15 mL    Miscellaneous Tube Feedin mL    sodium chloride 0.9%: 240 mL  Total IN: 2709.6 mL    OUT:    Drain (mL): 150 mL    Indwelling Catheter - Urethral (mL): 2045 mL    Vasopressin: 0 mL  Total OUT: 2195 mL    Total NET: 514.6 mL      04 Oct 2021 07:01  -  04 Oct 2021 09:05  --------------------------------------------------------  IN:    Enteral Tube Flush: 20 mL    sodium chloride 0.9%: 20 mL  Total IN: 40 mL    OUT:    Dexmedetomidine: 0 mL    Drain (mL): 30 mL    Indwelling Catheter - Urethral (mL): 190 mL    Lidocaine: 0 mL    Miscellaneous Tube Feedin mL  Total OUT: 220 mL    Total NET: -180 mL        ============================ LABS =========================                        8.8    20.51 )-----------( 73       ( 04 Oct 2021 00:31 )             27.8     10    136  |  103  |  14  ----------------------------<  155<H>  4.1   |  21<L>  |  0.38<L>    Ca    9.3      04 Oct 2021 00:29  Phos  2.0     10-  Mg     2.1     10-    TPro  7.2  /  Alb  3.9  /  TBili  1.4<H>  /  DBili  x   /  AST  23  /  ALT  20  /  AlkPhos  196<H>  10-04    LIVER FUNCTIONS - ( 04 Oct 2021 00:29 )  Alb: 3.9 g/dL / Pro: 7.2 g/dL / ALK PHOS: 196 U/L / ALT: 20 U/L / AST: 23 U/L / GGT: x           PT/INR - ( 04 Oct 2021 00:31 )   PT: 13.2 sec;   INR: 1.11 ratio         PTT - ( 04 Oct 2021 00:31 )  PTT:29.8 sec  ABG - ( 04 Oct 2021 00:22 )  pH, Arterial: 7.45  pH, Blood: x     /  pCO2: 33    /  pO2: 168   / HCO3: 23    / Base Excess: -0.7  /  SaO2: 99.9                ======================Micro/Rad/Cardio=================  Culture: Reviewed   CXR: Reviewed  Echo:Reviewed  ======================================================  PAST MEDICAL & SURGICAL HISTORY:  CHF (congestive heart failure)    CAD (coronary artery disease)    Depression    Pleural effusion    History of 2019 novel coronavirus disease (COVID-19)  2020    Hemorrhoids    Bleeding hemorrhoids    Peripheral arterial disease    Claudication    BPH with urinary obstruction    ACC/AHA stage D systolic heart failure    Anticoagulation goal of INR 2.0 to 2.5    Falls    Clavicle fracture    CKD (chronic kidney disease), stage III    Iron deficiency anemia    H/O epistaxis    Vertigo    GI bleed    S/P TVR (tricuspid valve repair)    S/P ventricular assist device    S/P endoscopy      ====================ASSESSMENT ==============  Stage D Nonischemic Cardiomyopathy, Status Post HM2 on 2017    Cardiogenic shock  Hemodynamic instability   Acute hypoxemic respiratory failure s/p trach , decannulated on ; Reintubated on    GI bleed , Status Post Enteroscopy   Anemia, in setting of melena   Chronic Kidney Disease  Stress hyperglycemia   C.diff positive on    Hypovolemic shock  Septic shock  Leukocytosis  GB thickening/percholecystic s/p perc choley by IT   SMA stenosis  Serratia/citrobacter pneumonia   Stenotrophomonas pneumonia   Enterobacter pneumonia   Nasuea/vomiting  Deconditioning    Plan:  ====================== NEUROLOGY=====================  Sedated with IV Precedex for vent synchrony   Tylenol PRN for analgesia    acetaminophen    Suspension .. 650 milliGRAM(s) Enteral Tube every 6 hours PRN Mild Pain (1 - 3)  dexMEDEtomidine Infusion 1.5 MICROgram(s)/kG/Hr (23.1 mL/Hr) IV Continuous <Continuous>    ==================== RESPIRATORY======================  Acute hypoxemic respiratory failure s/p #8 betzaida trach on ; decannulated on ; Reintubated on    Continue close monitoring of respiratory rate and breathing pattern, following of ABG’s with A-line monitoring, continuous pulse oximetry monitoring.   For trach today with ENT with cardiac anesthesia; consent obtained by family     Mechanical Ventilation:  Mode: AC/ CMV (Assist Control/ Continuous Mandatory Ventilation)  RR (machine): 20  TV (machine): 500  FiO2: 40  PEEP: 5  ITime: 1  MAP: 11  PIP: 25      ====================CARDIOVASCULAR==================  Stage D Nonischemic Cardiomyopathy, Status Post HM2 on 2017; LVAD settings 9200, flow 4.6  TTE : EF 12%, mild-mod AR, mild-mod TR, severe global LV systolic dysfxn, RV enlargement with decreased fxn, no obvious vegetations documented  monitor MAPs  VT yesterday, s/p Lidocaine drip, weaned to off this AM  Holding off on Propranolol for now     ===================HEMATOLOGIC/ONC ===================  CTA A/P on  +L RP hematoma   Acute blood loss anemia, monitor H&H/Plts    Thrombocytopenia, possible in setting of sepsis, now starting to trend up, 57->73; Rest of labs not consistent with DIC  Holding coumadin and ASA given recurrent GI bleeding, continue monitoring LDH     ===================== RENAL =========================  Continue monitoring urine output, I&OS, BUN/Cr   Euvolemic, even fluid balance, currently off diuretics.    Replete lytes PRN. Keep K> 4 and Mg >2    ==================== GASTROINTESTINAL===================  NPO for trach   CTA A/P : Evaluation of the mesenteric vessels is limited by streak artifact from LVAD. There appears to be severe stenosis of the proximal SMA; abdominal mesenteric doppler is recommended for further evaluation. 2.  No small bowel findings to suggest acute mesenteric ischemia. 3.  Focal dissections involving the right and left common iliac arteries.  Mesenteric duplex on 8/15 borderline stenosis of proximal SMA, s/p failed SMA stent, L fem RP hematoma on 9/10; No intervention for cholecystostomy tube/PEJ at this time   Reglan for gut motility    multivitamin 1 Tablet(s) Oral daily  GI prophylaxis, pantoprazole  Injectable 40 milliGRAM(s) IV Push every 12 hours  simethicone 80 milliGRAM(s) Chew every 8 hours PRN Gas  sodium chloride 0.9%. 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       Per endocrine      - c/w Methimazole, adjust based on labs        - Check Free T4 and total T3     insulin lispro (ADMELOG) corrective regimen sliding scale   SubCutaneous every 6 hours  methimazole 20 milliGRAM(s) Oral <User Schedule>    ========================INFECTIOUS DISEASE================  Temp 99.5F, WBC rising 19.50->20.51   Continue trending WBC and monitoring fever curve   CT C/A/P : progressive ISABELLE opacities and L consolidations, multifocal pneumonia  BCx  + Serratia marcescens, BCx 10/1 +Enterobacter cloacae complex, c/w Meropenem   Plan for CT Abd to assess for source of infection ?later this week   Vancomycin solution for C.diff prophylaxis.       meropenem  IVPB 1000 milliGRAM(s) IV Intermittent every 8 hours  vancomycin    Solution 125 milliGRAM(s) Oral every 12 hours      Patient requires continuous monitoring with bedside rhythm monitoring, pulse ox monitoring, and intermittent blood gas analysis. Care plan discussed with ICU care team. Patient remained critical and at risk for life threatening decompensation.     By signing my name below, I, Agnieszka Cherry, attest that this documentation has been prepared under the direction and in the presence of CLAUDIA Goldstein   Electronically signed: Cesia Shaffer, 10-04-21 @ 09:05    I, Cristóbal Cisneros, personally performed the services described in this documentation. all medical record entries made by the luisibmel were at my direction and in my presence. I have reviewed the chart and agree that the record reflects my personal performance and is accurate and complete  Electronically signed: CLAUDIA Goldstein        RICKY HAMPTON  MRN-[id number]  Patient is a 66y old  Male who presents with a chief complaint of Anemia, Supratherapeutic INR, Dark Stools (03 Oct 2021 20:13)    HPI:  65M PMH ACC/AHA stage D HF due to NICM HM2 LVAD , TV annuloplasty ring 17 as destination therapy due to severe peripheral artery disease with significant stenosis  SIADH, Depression, CKD-3 with hyperkalemia, past E. coli UTIs, driveline drainage (21) and COVID-19 (back in 2020)  He was recently seen in clinic where he complained of abdominal pain and dark stools w constipation back in May. He presents to [facility] ER today weakness and fatigue, moderate and + Black stools for three days, on coumadin secondary to warfarin use in the setting of an LVAD. Patient has required transfusions for GIB in the past. Mostly recently back in 2021 pt had anemia with dark stools. No interventions was done at that time. However Last Endoscopy was done in 2020 (negative). Today labs show patient is anemic with H/H of 4.5/16.3,. INR is 8.84 MAP in the 90s, Temp 35.1. He denies any chest pain, shortness of breath, dizziness, abd pain, nausea or vomiting.       (2021 16:57)      Surgery/Hospital Course:   admit for melena w/ anemia, INR 8.84   6/15 Capsul study (+) for small bowel bleed, balloon endoscopy (old blood in prox ileum); post EGD - septic w/ L opacity, re-intubated for concern for aspiration, TTE (Mod MR, decrease biV w/ interventricular septum boweing towards R)   bronch    +C Diff    CT C/A/P: Fluid filled colon which may be 2/2 rapid transit. Small bilateral pleffs with associates. Compressive atelectasis New ISABELLE & LLL  parenchymal opacities, suspicious for pneumonia. Moderate stenosis in the proximal superior mesenteric artery.    #8 Shiley trach at bedside    LVAD speed increased to 9200   Bronch   TC since . Patient transferred to SDU.    INR today 2.64.  H&H 7.3/24 this AM.  Will repeat CBC at noon, and will send stool guaiac Patient with persistent abdominal tenderness, rate of tube feeds decreased.  No nausea/vomiting.     INR today 2.4. H&H 9.1/28.6 low flow overnight /N&V, refusing Tube feeds on D5 1/2 normal  @50 cc/hr   INR 2.69  H&H 7.7/.1 refusing Tube feeds on D5 1/2 normal  @50 cc/hr. This am + BM Melena Dr Oneill HF  aware- PRBC x1  GI team consulted -  NPO  plan on study in am-  D/w Dr Cadet Patient  to return to CTU for further management; 1PRBCS    Post op INR 2.2 today.  No bleeding. BC + for SM.  Pt is hypotensive requiring pressor and inotropic support.  ID follow up today on Cefepime will follow.   R PTC for PTX    CT C/A/P: sub q emphysema in R chest wall, GGO RUL, small ascites CTH negative; Abd US: GB thickening, pericholecystic fluid     Perchole drain in place continues to drain total output overnight 133.  Fever today 38.8    duplex LE negative    Patient with persistent abdominal pain, refusing tube feeds and medications, Psych consulted   CTA A/P ordered to r/o mesenteric ischemia 2/2 persistent anorexia, nausea, vomiting. Revealed:  Evaluation of the mesenteric vessels is limited by streak artifact from LVAD. There appears to be severe stenosis of the proximal SMA; abdominal mesenteric doppler is recommended for further evaluation. 2.  No small bowel findings to suggest acute mesenteric ischemia. 3.  Focal dissections involving the right and left common iliac arteries.  8/15: Cultures resulted BC 1/2 +GPC in clusters, SC enterobacter; mesenteric duplex: borderline stenosis of proximal SMA  : CT C/A/P noncon: Nondular opacities in R lung apex w/cavitation, abd nl  :  Continue current care, treatment of thyrotoxicosis with medications as per endocrine, d/c ABX as per team.    RUQ sono: Contracted gallbladder with cholecystostomy tube in place.  9/10 failed SMA stent, L fem PSA, s/p 3U PRCB   CTA Abd/Pelvis L RP hematoma    Trach decannulated    intubated fro resp distress for increased WOB, LL pneumonia; CT C/A/P: progressive ISABELLE opacities and L consolidations, multifocal pneumonia    TTE (EF 25%, decreased RV, mod MR)   10/3 VT -> Lidocaine gtt     Today:  - VT yesterday, s/p Lidocaine drip, weaned to off this AM as it can alter his mental status and he has been in SR. Given 2g of Mg as well  - TTE showed no pericardial effusion  - NPO for bedside trach placement today with cardiac anesthesia; consent was obtained from the sister who is the HCP  - BCx  + Serratia marcescens, BCx 10/1 +Enterobacter cloacae complex, is sensitive to Meropenem, will continue    REVIEW OF SYSTEMS:  Unable to obtain, pt intubated and sedated     ICU Vital Signs Last 24 Hrs  T(C): 37.5 (04 Oct 2021 08:00), Max: 37.5 (04 Oct 2021 08:00)  T(F): 99.5 (04 Oct 2021 08:00), Max: 99.5 (04 Oct 2021 08:00)  HR: 94 (04 Oct 2021 09:00) (66 - 94)  BP: --  BP(mean): --  ABP: 77/72 (04 Oct 2021 09:00) (49/49 - 135/82)  ABP(mean): 75 (04 Oct 2021 09:00) (49 - 101)  RR: 20 (04 Oct 2021 09:00) (19 - 41)  SpO2: 100% (04 Oct 2021 09:00) (97% - 100%)      Physical Exam:  Gen:  intubated, NAD  CNS: moves all extremities to command on low dose sedation   Neck: no JVD, gauze over trach wound  RES : coarse breath sounds, no wheezing    CVS: +LVAD hum   Abd: Soft. Sybil drain with bilious fluid. Positive BS throughout. Mild RUQ abdominal tenderness by perc sybil.  : Landrum intact draining urine without hematuria.  Skin: No rash, erythema, cyanosis.  Vasc: Warm and well-perfused.  Ext:  no edema    ============================I/O===========================   I&O's Detail    03 Oct 2021 07:01  -  04 Oct 2021 07:00  --------------------------------------------------------  IN:    Albumin 5%  - 250 mL: 750 mL    Dexmedetomidine: 369.6 mL    Enteral Tube Flush: 110 mL    IV PiggyBack: 450 mL    Lidocaine: 135 mL    Lidocaine: 15 mL    Miscellaneous Tube Feedin mL    sodium chloride 0.9%: 240 mL  Total IN: 2709.6 mL    OUT:    Drain (mL): 150 mL    Indwelling Catheter - Urethral (mL): 2045 mL    Vasopressin: 0 mL  Total OUT: 2195 mL    Total NET: 514.6 mL      04 Oct 2021 07:01  -  04 Oct 2021 09:05  --------------------------------------------------------  IN:    Enteral Tube Flush: 20 mL    sodium chloride 0.9%: 20 mL  Total IN: 40 mL    OUT:    Dexmedetomidine: 0 mL    Drain (mL): 30 mL    Indwelling Catheter - Urethral (mL): 190 mL    Lidocaine: 0 mL    Miscellaneous Tube Feedin mL  Total OUT: 220 mL    Total NET: -180 mL        ============================ LABS =========================                        8.8    20.51 )-----------( 73       ( 04 Oct 2021 00:31 )             27.8     10    136  |  103  |  14  ----------------------------<  155<H>  4.1   |  21<L>  |  0.38<L>    Ca    9.3      04 Oct 2021 00:29  Phos  2.0     10-  Mg     2.1     10-    TPro  7.2  /  Alb  3.9  /  TBili  1.4<H>  /  DBili  x   /  AST  23  /  ALT  20  /  AlkPhos  196<H>  10-04    LIVER FUNCTIONS - ( 04 Oct 2021 00:29 )  Alb: 3.9 g/dL / Pro: 7.2 g/dL / ALK PHOS: 196 U/L / ALT: 20 U/L / AST: 23 U/L / GGT: x           PT/INR - ( 04 Oct 2021 00:31 )   PT: 13.2 sec;   INR: 1.11 ratio         PTT - ( 04 Oct 2021 00:31 )  PTT:29.8 sec  ABG - ( 04 Oct 2021 00:22 )  pH, Arterial: 7.45  pH, Blood: x     /  pCO2: 33    /  pO2: 168   / HCO3: 23    / Base Excess: -0.7  /  SaO2: 99.9                ======================Micro/Rad/Cardio=================  Culture: Reviewed   CXR: Reviewed  Echo:Reviewed  ======================================================  PAST MEDICAL & SURGICAL HISTORY:  CHF (congestive heart failure)    CAD (coronary artery disease)    Depression    Pleural effusion    History of 2019 novel coronavirus disease (COVID-19)  2020    Hemorrhoids    Bleeding hemorrhoids    Peripheral arterial disease    Claudication    BPH with urinary obstruction    ACC/AHA stage D systolic heart failure    Anticoagulation goal of INR 2.0 to 2.5    Falls    Clavicle fracture    CKD (chronic kidney disease), stage III    Iron deficiency anemia    H/O epistaxis    Vertigo    GI bleed    S/P TVR (tricuspid valve repair)    S/P ventricular assist device    S/P endoscopy      ====================ASSESSMENT ==============  Stage D Nonischemic Cardiomyopathy, Status Post HM2 on 2017    Cardiogenic shock  Hemodynamic instability   Acute hypoxemic respiratory failure s/p trach , decannulated on ; Reintubated on    GI bleed , Status Post Enteroscopy   Anemia, in setting of melena   Chronic Kidney Disease  Stress hyperglycemia   C.diff positive on    Hypovolemic shock  Septic shock  Leukocytosis  GB thickening/percholecystic s/p perc choley by IT   SMA stenosis  Serratia/citrobacter pneumonia   Stenotrophomonas pneumonia   Enterobacter pneumonia   Nasuea/vomiting  Deconditioning    Plan:  ====================== NEUROLOGY=====================  Sedated with IV Precedex for vent synchrony   Tylenol PRN for analgesia    acetaminophen    Suspension .. 650 milliGRAM(s) Enteral Tube every 6 hours PRN Mild Pain (1 - 3)  dexMEDEtomidine Infusion 1.5 MICROgram(s)/kG/Hr (23.1 mL/Hr) IV Continuous <Continuous>    ==================== RESPIRATORY======================  Acute hypoxemic respiratory failure s/p #8 betzaida trach on ; decannulated on ; Reintubated on    Continue close monitoring of respiratory rate and breathing pattern, following of ABG’s with A-line monitoring, continuous pulse oximetry monitoring.   For trach today with ENT with cardiac anesthesia    Mechanical Ventilation:  Mode: AC/ CMV (Assist Control/ Continuous Mandatory Ventilation)  RR (machine): 20  TV (machine): 500  FiO2: 40  PEEP: 5  ITime: 1  MAP: 11  PIP: 25      ====================CARDIOVASCULAR==================  Stage D Nonischemic Cardiomyopathy, Status Post HM2 on 2017; LVAD settings 9200, flow 4.6  TTE : EF 12%, mild-mod AR, mild-mod TR, severe global LV systolic dysfxn, RV enlargement with decreased fxn, no obvious vegetations documented  monitor MAPs  VT yesterday, s/p Lidocaine drip, weaned to off this AM  Holding off on Propranolol for now   TTE 10/4 no pericardial effusion    ===================HEMATOLOGIC/ONC ===================  CTA A/P on  +L RP hematoma   Acute blood loss anemia, monitor H&H/Plts    Thrombocytopenia, possible in setting of sepsis, now starting to trend up, 57->73; Rest of labs not consistent with DIC  Holding coumadin and ASA given recurrent GI bleeding, continue monitoring LDH     ===================== RENAL =========================  Continue monitoring urine output, I&OS, BUN/Cr   Euvolemic, even fluid balance, currently off diuretics.    Replete lytes PRN. Keep K> 4 and Mg >2    ==================== GASTROINTESTINAL===================  NPO for trach   CTA A/P : Evaluation of the mesenteric vessels is limited by streak artifact from LVAD. There appears to be severe stenosis of the proximal SMA; abdominal mesenteric doppler is recommended for further evaluation. 2.  No small bowel findings to suggest acute mesenteric ischemia. 3.  Focal dissections involving the right and left common iliac arteries.  Mesenteric duplex on 8/15 borderline stenosis of proximal SMA, s/p failed SMA stent, L fem RP hematoma on 9/10; No intervention for cholecystostomy tube/PEJ at this time   Reglan for gut motility    multivitamin 1 Tablet(s) Oral daily  GI prophylaxis, pantoprazole  Injectable 40 milliGRAM(s) IV Push every 12 hours  simethicone 80 milliGRAM(s) Chew every 8 hours PRN Gas  sodium chloride 0.9%. 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       Per endocrine      - c/w Methimazole, adjust based on labs        - Check Free T4 and total T3     insulin lispro (ADMELOG) corrective regimen sliding scale   SubCutaneous every 6 hours  methimazole 20 milliGRAM(s) Oral <User Schedule>    ========================INFECTIOUS DISEASE================  Temp 99.5F, WBC rising 19.50->20.51   Continue trending WBC and monitoring fever curve   CT C/A/P : progressive ISABELLE opacities and L consolidations, multifocal pneumonia  BCx  + Serratia marcescens, BCx 10/1 +Enterobacter cloacae complex, c/w Meropenem   Vancomycin solution for C.diff prophylaxis.       meropenem  IVPB 1000 milliGRAM(s) IV Intermittent every 8 hours  vancomycin    Solution 125 milliGRAM(s) Oral every 12 hours      Patient requires continuous monitoring with bedside rhythm monitoring, pulse ox monitoring, and intermittent blood gas analysis. Care plan discussed with ICU care team. Patient remained critical and at risk for life threatening decompensation.     By signing my name below, I, Agnieszka Cherry, attest that this documentation has been prepared under the direction and in the presence of CLAUDIA Goldstein   Electronically signed: Cesia Shaffer, 10-04-21 @ 09:05    I, Cristóbal Cisneros, personally performed the services described in this documentation. all medical record entries made by the luisibmel were at my direction and in my presence. I have reviewed the chart and agree that the record reflects my personal performance and is accurate and complete  Electronically signed: CLAUDIA Goldstein

## 2021-10-04 NOTE — PROGRESS NOTE ADULT - PROBLEM SELECTOR PLAN 2
- Continue current speed of 9200 RPM. Speed increased this admission due to signs of inadequately unloaded left ventricle  - Will remain off all AC and Aspirin indefinitely given recurrent GI bleeding. Have tried heparin trails and starting having episodes of melena   - Continue to trend LDH level  - Back up battery exchanged 10/1, back up battery serial number [id number], replace in 24 months   - Palliative care following - No sign of device malfunction  - Continue current speed of 9200 RPM. Speed increased this admission due to signs of inadequately unloaded left ventricle  - Will remain off all AC and Aspirin indefinitely given recurrent GI bleeding. Heparin attempted, but had recurrent melena   - Continue to trend LDH level  - Back up battery exchanged 10/1, back up battery serial number [id number], replace in 24 months   - Palliative care following

## 2021-10-04 NOTE — PROGRESS NOTE ADULT - SUBJECTIVE AND OBJECTIVE BOX
RICKY JOINT  MRN#: [id number]  Subjective:  Pulmonary progress  : recurrent Acute hypoxic respiratory Failure ,aspiration pneumonia, NICM  ,   65M PMH ACC/AHA stage D HF due to NICM HM2 LVAD , TV annuloplasty ring 17 as destination therapy due to severe peripheral artery disease with significant stenosis  SIADH, Depression, CKD-3 with hyperkalemia, past E. coli UTIs, driveline drainage (21) and COVID-19 (back in 2020)  He was recently seen in clinic where he complained of abdominal pain and dark stools w constipation back in May. He presents to [facility] ER today weakness and fatigue, moderate and + Black stools for three days, on coumadin secondary to warfarin use in the setting of an LVAD. Patient has required transfusions for GIB in the past. Mostly recently back in 2021 pt had anemia with dark stools. No interventions was done at that time. However Last Endoscopy was done in 2020 (negative). Today labs show patient is anemic with H/H of 4.5/16.3,. INR is 8.84 MAP in the 90s, Temp 35.1. He denies any chest pain, shortness of breath, dizziness, abd pain, nausea or vomiting. found to have  rectal bleeding underwent endoscopy ,old blood in the proximal ileum ,  develop sepsis with LL opacity given Antibiotics , Extubated , reintubated , Bronchoscopy on Zosyn for LL pneumonia  and Amiodarone S/P TV Annuloplasty , patient remain intubated on full ventilatory support .S/P multiple units of blood transfusion , remain on full ventilatory support on Precedex and propofol , new central IJ line , diarrhea C diff. +ve on po vancomycin and IV Flagyl,  mildly distended belly , fever start on cefepime 2gm q 8 hrs S/P tracheostomy .  new RT Subclavian central line continue on contact  isolation ,S/P  C-diff antibiotics, no more diarrhea back on full support mechanical ventilator , chest x ray show improvement in LLL air space disease, more awake and responsive on tube feeding no more diarrhea ,  no nausea or vomiting or diarrhea still very weak and tired , back on tube feeding ,still on po vancomycin , getting PT and OT at bed side ,   , no SOB getting stronger , improve muscle tone patient transfer to monitor bed still on contact isolation for C-Difficel colitis on 50% FI02, and change to Suresh  tube feeding still loose stool . H/H drop significantly require blood transfusion , most likely GI bleeding , IV heparin D/C ,  H/H is stable ., patient develop TR sided  pneumothorax require chest tube placement , RT IJ central line  placed , develop fever shaking chills , blood culture positive for serratia marcescens , start on cefepime .the patient  become hypoxic and hypotensive placed on full ventilatory support and Vasopressin , levophed and Dobutamine ,S/P blood transfusion on meropenem and vancomycin ,   , on and  off pressors , occasional agitation on Precedex .S/P IR cholecystostomy tube drainage placement in the RT upper Quadrant , resume anticoagulation chest x ray noted C-PAP trail lasted only for 2 hrs , new RT SC line and D/C RT IJ line , RT pig tail cathter has been removed , tolerating C-PAP trial placed on trach. collar 50% FI02 GI consultant noted on NG tube feeding as tolerated , develop AF RVR S/P  bolus Amiodarone  back to regular sinus Rhythm , Flat Affect depressed , back on tube feeding Vital AF at 60 cc/ hr .still intermittent abdominal pain , no fever saturation is accepted  back on full ventilatory support ,   on methimazole for hyperthyroidism ,  condition is the same ,still C/O Abdominal pain , white count is improving , no chest pain or SOB ,  .placed on Reglan 10 mg TID for gastric motility , depressed , withdrawn. , S/P  mesenteric angiogram , unable to stent SMA S/P 3 units of blood transfusion   RT IJ in place reassessed for stent in the SMA by vascular,  dark stool stable H/H ,surgical opinion for possible Lap cholecystectomy. over night events noted develop respiratory distress, , rectal bleeding, require intubation placed on full ventilatory support , FI02 is 50% also became hemianosmically unstable require triple pressor support with levophed , vasopressin and norsynephrine, pan culture placed  on Vancomycin IV and PO  and Zosyn, sedated with propofol kept NPO , new central line RT and left IJ cathter placed . Diflucan Added .fever of 38.5 weaned off  vasopressin , all culture are negative, resume tube feeding ,  white count is improving , on meropenem, vancomycin solution  , on Precedex, no over night events , for tracheostomy today       (2021 16:57)    PAST MEDICAL & SURGICAL HISTORY:  CHF (congestive heart failure)    CAD (coronary artery disease)  Depression    Pleural effusion    History of 2019 novel coronavirus disease (COVID-19)  2020    Hemorrhoids    Bleeding hemorrhoids    Peripheral arterial disease    Claudication    BPH with urinary obstruction    ACC/AHA stage D systolic heart failure  Anticoagulation goal of INR 2.0 to 2.5    Falls    Clavicle fracture    CKD (chronic kidney disease), stage III    Iron deficiency anemia    H/O epistaxis    Vertigo    GI bleed    S/P TVR (tricuspid valve repair)    S/P ventricular assist device    S/P endoscopy    OBJECTIVE:  ICU Vital Signs Last 24 Hrs  T(C): 38.2 (2021 10:00), Max: 38.5 (2021 12:00)  T(F): 100.8 (2021 10:00), Max: 101.3 (2021 12:00)  HR: 65 (2021 10:00) (61 - 69)  BP: --  BP(mean): --  ABP: 105/67 (2021 10:00) (90/54 - 113/64)  ABP(mean): 77 (2021 10:00) (63 - 77)  RR: 20 (2021 10:00) (19 - 35)  SpO2: 99% (2021 10:00) (96% - 100%)       @ 07: @ 07:00  --------------------------------------------------------  IN: 2693.9 mL / OUT: 1415 mL / NET: 1278.9 mL     @ 07: @ 10:49  --------------------------------------------------------  IN: 420.8 mL / OUT: 115 mL / NET: 305.8 mL  PHYSICAL EXAM: Daily   Elderly male intubated on full ventilatory support FI02 is 40%  support off  vasopressin , Precedex   Daily Weight in k.4 (2021 00:00)  HEENT:     + NCAT  + EOMI  - Conjuctival edema   - Icterus   - Thrush   - ETT  + NGT/OGT  Neck:         + FROM  RT IJ , LT IJ  lines JVD  - Nodes - Masses + Mid-line trachea - Tracheostomy  Chest:            normal A-P diameter    Lungs:          + CTA   + Rhonchi    - Rales    - Wheezing + Decreased  LT BS   - Dullness R L  Cardiac:       + S1 + S2    + RRR   - Irregular   - S3  - S4    - Murmurs   - Rub   - Hamman’s sign   Abdomen:    + BS  + Soft + Non-tender - Distended - Organomegaly - PEG .cholecystostomy tube in place  Extremities:   - Cyanosis U/L   - Clubbing  U/L  + LE/UE Edema   + Capillary refill    + Pulses   Neuro:        - Awake   -  Alert   - Confused   - Lethargic   + Sedated  + Generalized Weakness  Skin:        - Rashes    - Erythema   + Normal incisions   + IV sites intact          HOSPITAL MEDICATIONS: All medications reviewed and analyzed  MEDICATIONS  (STANDING):  amiodarone    Tablet 200 milliGRAM(s) Oral daily  chlorhexidine 0.12% Liquid 15 milliLiter(s) Oral Mucosa every 12 hours  chlorhexidine 2% Cloths 1 Application(s) Topical <User Schedule>  dexmedetomidine Infusion 0.5 MICROgram(s)/kG/Hr (9.81 mL/Hr) IV Continuous <Continuous>  dextrose 50% Injectable 50 milliLiter(s) IV Push every 15 minutes  heparin  Infusion 400 Unit(s)/Hr (12.5 mL/Hr) IV Continuous <Continuous>  Hydromorphone  Injectable 0.5 milliGRAM(s) IV Push once  insulin lispro (ADMELOG) corrective regimen sliding scale   SubCutaneous every 6 hours  pantoprazole  Injectable 40 milliGRAM(s) IV Push every 12 hours  piperacillin/tazobactam IVPB.. 3.375 Gram(s) IV Intermittent every 8 hours  propofol Infusion 20 MICROgram(s)/kG/Min (9.42 mL/Hr) IV Continuous <Continuous>  sodium chloride 0.9% lock flush 3 milliLiter(s) IV Push every 8 hours  sodium chloride 0.9%. 1000 milliLiter(s) (10 mL/Hr) IV Continuous <Continuous>    MEDICATIONS  (PRN):  acetaminophen    Suspension .. 650 milliGRAM(s) Oral every 6 hours PRN Temp greater or equal to 38C (100.4F)    LABS: All Lab data reviewed and analyzed                        9.4    19.50 )-----------( 57       ( 03 Oct 2021 01:10 )             30.7    10    135  |  103  |  15  ----------------------------<  179<H>  4.1   |  21<L>  |  0.35<L>    Ca    9.1      03 Oct 2021 14:17  Phos  2.5     10-  Mg     2.1     10    TPro  6.8  /  Alb  3.5  /  TBili  1.5<H>  /  DBili  x   /  AST  19  /  ALT  19  /  AlkPhos  178<H>  10      Ca    9.9      02 Oct 2021 00:34  Phos  2.5     10-  Mg     1.7     10-    TPro  6.8  /  Alb  3.3  /  TBili  2.1<H>  /  DBili  x   /  AST  14  /  ALT  17  /  AlkPhos  152<H>  10-02    Ca    9.6      01 Oct 2021 01:25  Phos  3.5     10-  Mg     1.9     10-01    TPro  6.6  /  Alb  3.3  /  TBili  4.0<H>  /  DBili  x   /  AST  15  /  ALT  16  /  AlkPhos  150<H>  10-01                                                                                                                                                                  PTT - ( 2021 04:52 )  PTT:45.2 sec LIVER FUNCTIONS - ( 2021 00:42 )  Alb: 3.4 g/dL / Pro: 6.7 g/dL / ALK PHOS: 213 U/L / ALT: 15 U/L / AST: 24 U/L / GGT: x           RADIOLOGY: - Reviewed and analyzed RT Pig tail cathter  , LVAD HM2, CT scan of abdomen reviewed result noted

## 2021-10-04 NOTE — CHART NOTE - NSCHARTNOTEFT_GEN_A_CORE
Nutrition Follow Up Note  Patient seen for: Malnutrition follow-up    Chart reviewed, events noted. 66M, PMH ACC/AHA stage D HF 2/2 NICM s/p HM2 LVAD (2017). Here initially for GIB prolonged hospital course, s/p tracheostomy, acalculous cholecystitis s/p perc dawn. Patient c persistent intermittent abdominal pain, per team, possible mesenteric ischemia from possible SMA stenosis; Underwent mesenteric angiogram on 9/10, found with SMA stenosis, however unable to place stent. CTA Abd/Pelvis () reveals L RP hematoma. Was tolerating TC and decannulated , transferred to [place] , had multiple episodes of vomiting and went into acute respiratory distress to which he was subsequently intubated. CT Scan  showed multifocal PNA; recultured on  and positive enterobacter cloacae - on abx. Scheduled for trach placement today    Source: EMR, Team    Diet, NPO after Midnight:      NPO Start Date: 03-Oct-2021,   NPO Start Time: 23:59 (10-03-21 @ 10:51)  Diet, NPO with Tube Feed:   Tube Feeding Modality: Nasogastric  Vital 1.5 Tank (VITAL1.5RTH)  Total Volume for 24 Hours (mL): 960  Continuous  Starting Tube Feed Rate {mL per Hour}: 20  Increase Tube Feed Rate by (mL): 5     Every 6 hours  Until Goal Tube Feed Rate (mL per Hour): 40  Tube Feed Duration (in Hours): 24  Tube Feed Start Time: 10:00 (21 @ 10:11)    EN order provides: 1440kcals, 65g protein, 733ml free H2O    Is EN order appropriate? No - Previously ordered for Vital 1.5 with ordered goal rate of 65ml/hr x 24hrs, which met 100% nutritional needs    Nutrition-related concerns:  - Currently NPO pending trach placement  - Sedation now discontinued  - Cholecystostomy tube - general surgery prefers chronic tube management rather than gallbladder removal  - Multivitamin and thiamin supplementation ordered daily  - Insulin Sliding Scale ordered to optimize BG; now off steroids     GI:  Last BM 10/3.   Bowel Regimen? [] Yes   [x] No  - Noted on abx course at this time    Weight in k.8 (10-04), 69.8 (10-03), 62.7 (10-02), 60 (10-01), 61.9 (), 67.8 (), 62.1 (), 58.1 (), 65.7 (09-15), 58.4 (), 66.2 (), 71.2 ()    Weight history:   - Admission weight: 176.3 pounds / 80 kg (6/15)      MEDICATIONS  (STANDING):  insulin lispro (ADMELOG) corrective regimen sliding scale  meropenem  IVPB  methimazole  multivitamin  pantoprazole  Injectable  sodium chloride 0.9%.  thiamine  vancomycin    Solution    Pertinent Labs: 10-04 @ 00:29: Na 136, BUN 14, Cr 0.38<L>, <H>, K+ 4.1, Phos 2.0<L>, Mg 2.1, Alk Phos 196<H>, ALT/SGPT 20, AST/SGOT 23, HbA1c --    A1C with Estimated Average Glucose Result: 5.4 % (08-15-21 @ 13:52)  A1C with Estimated Average Glucose Result: 5.6 % (06-15-21 @ 02:42)    Finger Sticks:  POCT Blood Glucose.: 104 mg/dL (10-04 @ 11:22)  POCT Blood Glucose.: 136 mg/dL (10-04 @ 05:25)  POCT Blood Glucose.: 194 mg/dL (10-03 @ 17:41)      Skin per nursing documentation: no pressure injuries   Edema: none at this time     Estimated Nutrition Needs:   Using dosing weight 78.5 kg  Energy Needs (25-30 kcal/kg): 2971-6131 kcal/day  Protein Needs (1.2-1.4 g/kg):     Previous Nutrition Diagnosis: Severe chronic malnutrition  Nutrition diagnosis is ongoing & addressed with tube feeds as tolerated    No new nutrition diagnosis at this time    Recommendations:    1. Recommend as medically feasible, reinitiate Vital 1.5; increase as tolerated to goal rate of 65ml/hr x 24hrs. At goal to provide 1560ml formula, 2340kcals, 105g protein, 1192ml free H2O (meets ~30kcals/kg & 1.33g protein/kg based on dosing wt 78.5kg).  2. Continue micronutrient supplementation as ordered  3. RD to remain available to adjust EN formulary, volume/rate PRN.     Monitoring and Evaluation:   Continue to monitor Nutritional intake, Tolerance to diet prescription, weights, labs, skin integrity  RD remains available upon request and will follow up per protocol     Wendy Travis MS, RD, CDN, [facility]  pager #[phone]

## 2021-10-04 NOTE — PROGRESS NOTE ADULT - PROBLEM SELECTOR PLAN 3
-  was initially decannulated on 9/23 and was ultimately reintubated on 9/26 after developing acute respiratory distress  - ENT following, schedule to undergo trach placement today

## 2021-10-04 NOTE — PROGRESS NOTE ADULT - SUBJECTIVE AND OBJECTIVE BOX
POD/STATUS POST/ENT ISSUE: POD #0 for trach     INTERVAL HPI: 67 y/o male POD #0 for tracheostomy #6 DCT cuffed in place 2/2 respiratory failure. Pt is doing well on the vent. no complaints or issues.     ICU Vital Signs Last 24 Hrs  T(C): 36.4 (04 Oct 2021 20:00), Max: 37.5 (04 Oct 2021 08:00)  T(F): 97.5 (04 Oct 2021 20:00), Max: 99.5 (04 Oct 2021 08:00)  HR: 65 (04 Oct 2021 21:15) (65 - 119)  BP: --  BP(mean): --  ABP: 79/65 (04 Oct 2021 21:15) (49/49 - 135/82)  ABP(mean): 71 (04 Oct 2021 21:15) (49 - 101)  RR: 17 (04 Oct 2021 21:15) (14 - 32)  SpO2: 100% (04 Oct 2021 21:15) (97% - 100%)                          8.8    20.51 )-----------( 73       ( 04 Oct 2021 00:31 )             27.8     10-04    136  |  103  |  14  ----------------------------<  155<H>  4.1   |  21<L>  |  0.38<L>    Ca    9.3      04 Oct 2021 00:29  Phos  2.0     10-04  Mg     2.1     10-04    TPro  7.2  /  Alb  3.9  /  TBili  1.4<H>  /  DBili  x   /  AST  23  /  ALT  20  /  AlkPhos  196<H>  10-04      PHYSICAL EXAM:  Gen: NAD, well-developed  Head: Normocephalic, Atraumatic  Face: no edema/erythema/fluctuance  Eyes:  no scleral injection  Nose: Nares bilaterally patent, no discharge  Mouth: No Stridor / Drooling / Trismus.  Mucosa moist, tongue/uvula midline, oropharynx clear  Neck: #6 DCT inflated cuff with minimal serosanguinous oozing from stoma, no active bleeding, sutures x4 and umbilical tie in place.  No lymphadenopathy, trachea midline, no masses  Resp: ventilating well, satting   CV: no peripheral edema/cyanosis

## 2021-10-04 NOTE — PROGRESS NOTE ADULT - ASSESSMENT
65 YO M with a history of stage D NICM s/p HM2 on 9/2017 as DT (due to severe PAD) with TV ring, prior COVID-19 infection 4/2020, recurrent syncope post LVAD s/p ILR, and chronic abdominal pain with prior negative workup who was admitted 6/14/21 with symptomatic anemia with Hgb 4.5 in setting of INR 8.8 without hemodynamic instability and has since had a protracted hospitalization. He was transfused and underwent VCE which showed active bleeding in the mid small bowel but subsequent enteroscopy 6/15 did not reveal any active bleeding. He acutely decompensated after procedure with fever/hypertension, low flow alarms, and pulmonary infiltrate with hypoxia requiring intubation from probable aspiration PNA. He was unable to extubated and has since undergone tracheostomy but tolerating persistent trach collar and nearing ability for decannulation. 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 He underwent angiogram on 9/10, stent was unable to be placed and course was then complicated by RP bleed. He continued to have persistent leukocytosis and febrile episodes and was noted to have positive sputum culture for serratia marcescens and completed his course of abx. He was initially decannulated on 9/23. Recently he started having multiples of melena, emesis and went into acte respiratory distress and was ultimately re-intubated on 9/26.     His most recent blood cultures are positive for enterobacter colace and remains on IV antibiotics. Pressors have been weaned off and MAPs are at goal (70-80). Is schedule to undergo trach today with ENT. Overall prognosis remains guarded. Will need to discuss GOC with patient and family.    65 YO M with a history of stage D NICM s/p HM2 on 9/2017 as DT (due to severe PAD) with TV ring, prior COVID-19 infection 4/2020, recurrent syncope post LVAD s/p ILR, and chronic abdominal pain with prior negative workup who was admitted 6/14/21 with symptomatic anemia with Hgb 4.5 in setting of INR 8.8 without hemodynamic instability and has since had a protracted hospitalization. He was transfused and underwent VCE which showed active bleeding in the mid small bowel but subsequent enteroscopy 6/15 did not reveal any active bleeding. He acutely decompensated after procedure with fever/hypertension, low flow alarms, and pulmonary infiltrate with hypoxia requiring intubation from probable aspiration PNA. He was unable to extubated and has since undergone tracheostomy but tolerating persistent trach collar and nearing ability for decannulation. 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 He underwent angiogram on 9/10, stent was unable to be placed and course was then complicated by RP bleed. He continued to have persistent leukocytosis and febrile episodes and was noted to have positive sputum culture for serratia marcescens and completed his course of abx. He was initially decannulated on 9/23. Recently he started having multiples of melena, emesis and went into acte respiratory distress and was ultimately re-intubated on 9/26.     His most recent blood cultures are positive for enterobacter cloacae and remains on IV antibiotics. Pressors have been weaned off and MAPs are at goal (70-80). Is schedule to undergo trach today with ENT. Overall prognosis remains guarded. Will need to discuss GOC with patient and family.

## 2021-10-05 NOTE — CHART NOTE - NSCHARTNOTEFT_GEN_A_CORE
Per Dr Pierce please start Aldactone 12.5 mg PO QD for low K today. Also please discuss with Endocrine about possibly re-starting Propanolol

## 2021-10-05 NOTE — PROGRESS NOTE ADULT - SUBJECTIVE AND OBJECTIVE BOX
RICKY HAMPTON  MRN-[id number]  Patient is a 66y old  Male who presents with a chief complaint of Anemia, Supratherapeutic INR, Dark Stools (05 Oct 2021 13:10)    HPI:  65M PMH ACC/AHA stage D HF due to NICM HM2 LVAD , TV annuloplasty ring 17 as destination therapy due to severe peripheral artery disease with significant stenosis  SIADH, Depression, CKD-3 with hyperkalemia, past E. coli UTIs, driveline drainage (21) and COVID-19 (back in 2020)  He was recently seen in clinic where he complained of abdominal pain and dark stools w constipation back in May. He presents to [facility] ER today weakness and fatigue, moderate and + Black stools for three days, on coumadin secondary to warfarin use in the setting of an LVAD. Patient has required transfusions for GIB in the past. Mostly recently back in 2021 pt had anemia with dark stools. No interventions was done at that time. However Last Endoscopy was done in 2020 (negative). Today labs show patient is anemic with H/H of 4.5/16.3,. INR is 8.84 MAP in the 90s, Temp 35.1. He denies any chest pain, shortness of breath, dizziness, abd pain, nausea or vomiting.       (2021 16:57)      Surgery/Hospital course:   admit for melena w/ anemia, INR 8.84   6/15 Capsul study (+) for small bowel bleed, balloon endoscopy (old blood in prox ileum); post EGD - septic w/ L opacity, re-intubated for concern for aspiration, TTE (Mod MR, decrease biV w/ interventricular septum boweing towards R)   bronch    +C Diff    CT C/A/P: Fluid filled colon which may be 2/2 rapid transit. Small bilateral pleffs with associates. Compressive atelectasis New ISABELLE & LLL  parenchymal opacities, suspicious for pneumonia. Moderate stenosis in the proximal superior mesenteric artery.    #8 Shiley trach at bedside    LVAD speed increased to 9200   Bronch   TC since . Patient transferred to SDU.    INR today 2.64.  H&H 7.3/24 this AM.  Will repeat CBC at noon, and will send stool guaiac Patient with persistent abdominal tenderness, rate of tube feeds decreased.  No nausea/vomiting.     INR today 2.4. H&H 9.1/28.6 low flow overnight /N&V, refusing Tube feeds on D5 1/2 normal  @50 cc/hr   INR 2.69  H&H 7.7/.1 refusing Tube feeds on D5 1/2 normal  @50 cc/hr. This am + BM Melena Dr Oneill HF  aware- PRBC x1  GI team consulted -  NPO  plan on study in am-  D/w Dr Cadet Patient  to return to CTU for further management; 1PRBCS    Post op INR 2.2 today.  No bleeding. BC + for SM.  Pt is hypotensive requiring pressor and inotropic support.  ID follow up today on Cefepime will follow.   R PTC for PTX    CT C/A/P: sub q emphysema in R chest wall, GGO RUL, small ascites CTH negative; Abd US: GB thickening, pericholecystic fluid     Perchole drain in place continues to drain total output overnight 133.  Fever today 38.8    duplex LE negative    Patient with persistent abdominal pain, refusing tube feeds and medications, Psych consulted   CTA A/P ordered to r/o mesenteric ischemia 2/2 persistent anorexia, nausea, vomiting. Revealed:  Evaluation of the mesenteric vessels is limited by streak artifact from LVAD. There appears to be severe stenosis of the proximal SMA; abdominal mesenteric doppler is recommended for further evaluation. 2.  No small bowel findings to suggest acute mesenteric ischemia. 3.  Focal dissections involving the right and left common iliac arteries.  8/15: Cultures resulted BC 1/2 +GPC in clusters, SC enterobacter; mesenteric duplex: borderline stenosis of proximal SMA  : CT C/A/P noncon: Nondular opacities in R lung apex w/cavitation, abd nl  :  Continue current care, treatment of thyrotoxicosis with medications as per endocrine, d/c ABX as per team.    RUQ sono: Contracted gallbladder with cholecystostomy tube in place.  9/10 failed SMA stent, L fem PSA, s/p 3U PRCB   CTA Abd/Pelvis L RP hematoma    Trach decannulated    intubated fro resp distress for increased WOB, LL pneumonia; CT C/A/P: progressive ISABELLE opacities and L consolidations, multifocal pneumonia    TTE (EF 25%, decreased RV, mod MR)   10/3 VT -> Lidocaine gtt   10/4 Trach size 6 DCT cuff  10/5 CT abd (neg for intra-abd abcess, severe stenosis of prox SMA and b/l iliac arteries)    Today/Overnight:  -CT Abd/Pel neg for intra-abd abcess, severe stenosis of prox SMA and b/l iliac arteries  -  S/p trach yesterday, plan for CPAP vent weaning as tolerated.   - give 2U PRBC  - started on Aldactone for diuresis     Vital Signs Last 24 Hrs  T(C): 36.3 (05 Oct 2021 16:00), Max: 37 (05 Oct 2021 08:00)  T(F): 97.3 (05 Oct 2021 16:00), Max: 98.6 (05 Oct 2021 08:00)  HR: 72 (05 Oct 2021 18:45) (53 - 73)  BP: --  BP(mean): --  RR: 18 (05 Oct 2021 18:45) (9 - )  SpO2: 100% (05 Oct 2021 18:45) (99% - 100%)  ============================I/O===========================  I&O's Detail    04 Oct 2021 07:01  -  05 Oct 2021 07:00  --------------------------------------------------------  IN:    Albumin 5%  - 250 mL: 500 mL    Dexmedetomidine: 297.3 mL    Enteral Tube Flush: 120 mL    IV PiggyBack: 666.5 mL    IV PiggyBack: 200 mL    Miscellaneous Tube Feedin mL    sodium chloride 0.9%: 230 mL    Vasopressin: 2 mL  Total IN: 2415.8 mL    OUT:    Drain (mL): 285 mL    Indwelling Catheter - Urethral (mL): 1095 mL    Lidocaine: 0 mL  Total OUT: 1380 mL    Total NET: 1035.8 mL      05 Oct 2021 07:01  -  05 Oct 2021 19:24  --------------------------------------------------------  IN:    Dexmedetomidine: 284.6 mL    Enteral Tube Flush: 660 mL    IV PiggyBack: 100 mL    IV PiggyBack: 366.6 mL    Miscellaneous Tube Feedin mL    PRBCs (Packed Red Blood Cells): 300 mL    PRBCs (Packed Red Blood Cells): 300 mL    sodium chloride 0.9%: 110 mL    Vasopressin: 6 mL  Total IN: 2377.2 mL    OUT:    Drain (mL): 75 mL    Indwelling Catheter - Urethral (mL): 1020 mL  Total OUT: 1095 mL    Total NET: 1282.2 mL        ============================ LABS =========================                        8.1    14.03 )-----------( 103      ( 05 Oct 2021 00:23 )             26.5     10-05    135  |  104  |  14  ----------------------------<  105<H>  3.8   |  20<L>  |  0.48<L>    Ca    9.3      05 Oct 2021 00:23  Phos  2.3     10-  Mg     1.9     10-    TPro  7.2  /  Alb  4.0  /  TBili  1.3<H>  /  DBili  x   /  AST  16  /  ALT  18  /  AlkPhos  157<H>  10-    LIVER FUNCTIONS - ( 05 Oct 2021 00:23 )  Alb: 4.0 g/dL / Pro: 7.2 g/dL / ALK PHOS: 157 U/L / ALT: 18 U/L / AST: 16 U/L / GGT: x           PT/INR - ( 04 Oct 2021 00:31 )   PT: 13.2 sec;   INR: 1.11 ratio         PTT - ( 04 Oct 2021 00:31 )  PTT:29.8 sec  ABG - ( 05 Oct 2021 17:28 )  pH, Arterial: 7.37  pH, Blood: x     /  pCO2: 37    /  pO2: 203   / HCO3: 21    / Base Excess: -3.5  /  SaO2: 100.0               ======================Micro/Rad/Cardio=================  Culture: Reviewed   CXR: Reviewed  Echo: Reviewed  ======================================================  PAST MEDICAL & SURGICAL HISTORY:  CHF (congestive heart failure)    CAD (coronary artery disease)    Depression    Pleural effusion    History of  novel coronavirus disease (COVID-19)  2020    Hemorrhoids    Bleeding hemorrhoids    Peripheral arterial disease    Claudication    BPH with urinary obstruction    ACC/AHA stage D systolic heart failure    Anticoagulation goal of INR 2.0 to 2.5    Falls    Clavicle fracture    CKD (chronic kidney disease), stage III    Iron deficiency anemia    H/O epistaxis    Vertigo    GI bleed    S/P TVR (tricuspid valve repair)    S/P ventricular assist device    S/P endoscopy      ========================ASSESSMENT ================  Stage D Nonischemic Cardiomyopathy, Status Post HM2 on 2017    Cardiogenic shock  Hemodynamic instability   Acute hypoxemic respiratory failure s/p trach , decannulated on ; Reintubated on    GI bleed , Status Post Enteroscopy   Anemia, in setting of melena S/P 2U PRBC   Chronic Kidney Disease  Stress hyperglycemia   C.diff positive on    Hypovolemic shock  Septic shock  Leukocytosis  GB thickening/percholecystic s/p perc choley by IT   SMA stenosis  Serratia/citrobacter pneumonia   Stenotrophomonas pneumonia   Enterobacter pneumonia   Nasuea/vomiting  Deconditioning    Plan:  ====================== NEUROLOGY=====================  Sedated with IV Precedex to maintain vent synchrony   Tylenol PRN for analgesia   PT as tolerated     acetaminophen    Suspension .. 650 milliGRAM(s) Enteral Tube every 6 hours PRN Mild Pain (1 - 3)  dexMEDEtomidine Infusion 1.5 MICROgram(s)/kG/Hr (23.1 mL/Hr) IV Continuous <Continuous>    ==================== RESPIRATORY======================  Acute hypoxemic respiratory failure s/p #8 shiley trach on ; decannulated on ; Reintubated on ; trach size 6 DCT cuff on 10/4   Continue close monitoring of respiratory rate and breathing pattern, following of ABG’s with A-line monitoring, continuous pulse oximetry monitoring.   CPAP vent weaning as tolerated     Mechanical Ventilation:  Mode: AC/ CMV (Assist Control/ Continuous Mandatory Ventilation)  RR (machine): 12  TV (machine): 500  FiO2: 40  PEEP: 5  ITime: 1  MAP: 9  PIP: 17      ====================CARDIOVASCULAR==================  Stage D Nonischemic Cardiomyopathy, Status Post HM2 on 2017; LVAD settings 9200, flow 5.6   TTE 10/4:  Severely decreased LV systolic function, Diffuse hypokinesis. Mild AR. No pericardial effusion   VT on 10/4, s/p Lidocaine drip, continue close tele monitoring   Pressor support with IV Vasopressin   Holding off on Propranolol for now     vasopressin Infusion 0.017 Unit(s)/Min (1 mL/Hr) IV Continuous <Continuous>  ===================HEMATOLOGIC/ONC ===================  CTA A/P on  +L RP hematoma   Acute blood loss anemia, H/H 8.1/26.5, S/P 2U of pRBCs today and monitor CBC   Thrombocytopenia, possible in setting of sepsis, now starting to trend up, 73->103, Rest of labs not consistent with DIC  Holding coumadin and ASA given recurrent GI bleeding, continue monitoring LDH     ===================== RENAL =========================  Hx of CKD, continue monitoring urine output, I&OS, BUN/Cr   Euvolemic, even fluid balance, currently off diuretics.    Replete lytes PRN. Keep K> 4 and Mg >2  C/w Aldactone for diuresis     spironolactone 12.5 milliGRAM(s) Oral every 12 hours    ==================== GASTROINTESTINAL===================  Tolerating TF Vital AF 1.5, @ goal 40 cc/hr   CT A/P 10/5 neg for intra-abd abcess, severe stenosis of prox SMA and b/l iliac arteries  CTA A/P : Evaluation of the mesenteric vessels is limited by streak artifact from LVAD. There appears to be severe stenosis of the proximal SMA; abdominal mesenteric doppler is recommended for further evaluation. 2.  No small bowel findings to suggest acute mesenteric ischemia. 3.  Focal dissections involving the right and left common iliac arteries.  Mesenteric duplex on 8/15 borderline stenosis of proximal SMA, s/p failed SMA stent, L fem RP hematoma on 9/10; No intervention for cholecystostomy tube/PEJ at this time   Reglan for gut motility  PRN Simethicone for gas  Continue Protonix for stress ulcer prophylaxis.       metoclopramide Injectable 10 milliGRAM(s) IV Push every 8 hours  multivitamin 1 Tablet(s) Oral daily  pantoprazole  Injectable 40 milliGRAM(s) IV Push every 12 hours  simethicone 80 milliGRAM(s) Chew every 8 hours PRN Gas  sodium chloride 0.9%. 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       Per endocrine      - c/w Methimazole, adjust based on labs        - Check Free T4 and total T3     insulin lispro (ADMELOG) corrective regimen sliding scale   SubCutaneous every 6 hours  methimazole 20 milliGRAM(s) Oral <User Schedule>      ========================INFECTIOUS DISEASE================  Afebrile, WBC downtrending 20.51->14.03   Continue trending WBC and monitoring fever curve   CT C/A/P : progressive ISABELLE opacities and L consolidations, multifocal pneumonia  BCx  + Serratia marcescens, BCx 10/1 +Enterobacter cloacae complex, repeat BCx on 10/3 NGTD, c/w Meropenem   repeat BCx sent today, follow up  Vancomycin solution for C.diff prophylaxis  Will f/u antibiotic regimen with ID    meropenem  IVPB 1000 milliGRAM(s) IV Intermittent every 8 hours  vancomycin    Solution 125 milliGRAM(s) Oral every 12 hours      Patient requires continuous monitoring with bedside rhythm monitoring, pulse oximetry monitoring, and continuous central venous and arterial pressure monitoring; and intermittent blood gas analysis.  Care plan discussed with ICU care team.    Patient remained critical, at risk for life threatening decompensation.   I have spent 35 minutes providing acute care with multiple re-evaluations throughout the evening.     By signing my name below, I, Daniela Chowdary, attest that this documentation has been prepared under the direction and in the presence of Kali Mendoza NP.  Electronically signed: Cesia Suresh, 10-05-21 @ 19:24    I, Kali Mendoza NP, personally performed the services described in this documentation. All medical record entries made by the scribe were at my direction and in my presence. I have reviewed the chart and agree that the record reflects my personal performance and is accurate and complete  Electronically signed: Kali Mendoza NP, 10-05-21 @ 19:24       RICKY HAMPTON  MRN-[id number]  Patient is a 66y old  Male who presents with a chief complaint of Anemia, Supratherapeutic INR, Dark Stools (05 Oct 2021 13:10)    HPI:  65M PMH ACC/AHA stage D HF due to NICM HM2 LVAD , TV annuloplasty ring 17 as destination therapy due to severe peripheral artery disease with significant stenosis  SIADH, Depression, CKD-3 with hyperkalemia, past E. coli UTIs, driveline drainage (21) and COVID-19 (back in 2020)  He was recently seen in clinic where he complained of abdominal pain and dark stools w constipation back in May. He presents to [facility] ER today weakness and fatigue, moderate and + Black stools for three days, on coumadin secondary to warfarin use in the setting of an LVAD. Patient has required transfusions for GIB in the past. Mostly recently back in 2021 pt had anemia with dark stools. No interventions was done at that time. However Last Endoscopy was done in 2020 (negative). Today labs show patient is anemic with H/H of 4.5/16.3,. INR is 8.84 MAP in the 90s, Temp 35.1. He denies any chest pain, shortness of breath, dizziness, abd pain, nausea or vomiting.       (2021 16:57)      Surgery/Hospital course:   admit for melena w/ anemia, INR 8.84   6/15 Capsul study (+) for small bowel bleed, balloon endoscopy (old blood in prox ileum); post EGD - septic w/ L opacity, re-intubated for concern for aspiration, TTE (Mod MR, decrease biV w/ interventricular septum boweing towards R)   bronch    +C Diff    CT C/A/P: Fluid filled colon which may be 2/2 rapid transit. Small bilateral pleffs with associates. Compressive atelectasis New ISABELLE & LLL  parenchymal opacities, suspicious for pneumonia. Moderate stenosis in the proximal superior mesenteric artery.    #8 Shiley trach at bedside    LVAD speed increased to 9200   Bronch   TC since . Patient transferred to SDU.    INR today 2.64.  H&H 7.3/24 this AM.  Will repeat CBC at noon, and will send stool guaiac Patient with persistent abdominal tenderness, rate of tube feeds decreased.  No nausea/vomiting.     INR today 2.4. H&H 9.1/28.6 low flow overnight /N&V, refusing Tube feeds on D5 1/2 normal  @50 cc/hr   INR 2.69  H&H 7.7/.1 refusing Tube feeds on D5 1/2 normal  @50 cc/hr. This am + BM Melena Dr Oneill HF  aware- PRBC x1  GI team consulted -  NPO  plan on study in am-  D/w Dr Cadet Patient  to return to CTU for further management; 1PRBCS    Post op INR 2.2 today.  No bleeding. BC + for SM.  Pt is hypotensive requiring pressor and inotropic support.  ID follow up today on Cefepime will follow.   R PTC for PTX    CT C/A/P: sub q emphysema in R chest wall, GGO RUL, small ascites CTH negative; Abd US: GB thickening, pericholecystic fluid     Perchole drain in place continues to drain total output overnight 133.  Fever today 38.8    duplex LE negative    Patient with persistent abdominal pain, refusing tube feeds and medications, Psych consulted   CTA A/P ordered to r/o mesenteric ischemia 2/2 persistent anorexia, nausea, vomiting. Revealed:  Evaluation of the mesenteric vessels is limited by streak artifact from LVAD. There appears to be severe stenosis of the proximal SMA; abdominal mesenteric doppler is recommended for further evaluation. 2.  No small bowel findings to suggest acute mesenteric ischemia. 3.  Focal dissections involving the right and left common iliac arteries.  8/15: Cultures resulted BC 1/2 +GPC in clusters, SC enterobacter; mesenteric duplex: borderline stenosis of proximal SMA  : CT C/A/P noncon: Nondular opacities in R lung apex w/cavitation, abd nl  :  Continue current care, treatment of thyrotoxicosis with medications as per endocrine, d/c ABX as per team.    RUQ sono: Contracted gallbladder with cholecystostomy tube in place.  9/10 failed SMA stent, L fem PSA, s/p 3U PRCB   CTA Abd/Pelvis L RP hematoma    Trach decannulated    intubated fro resp distress for increased WOB, LL pneumonia; CT C/A/P: progressive ISABELLE opacities and L consolidations, multifocal pneumonia    TTE (EF 25%, decreased RV, mod MR)   10/3 VT -> Lidocaine gtt   10/4 Trach size 6 DCT cuff  10/5 CT abd (neg for intra-abd abcess, severe stenosis of prox SMA and b/l iliac arteries)    Today/Overnight:  -CT Abd/Pel neg for intra-abd abcess, severe stenosis of prox SMA and b/l iliac arteries  -  S/p trach yesterday, plan for CPAP vent weaning as tolerated.   - give 2U PRBC  - started on Aldactone for diuresis     Vital Signs Last 24 Hrs  T(C): 36.3 (05 Oct 2021 16:00), Max: 37 (05 Oct 2021 08:00)  T(F): 97.3 (05 Oct 2021 16:00), Max: 98.6 (05 Oct 2021 08:00)  HR: 72 (05 Oct 2021 18:45) (53 - 73)  BP: --  BP(mean): --  RR: 18 (05 Oct 2021 18:45) (9 - )  SpO2: 100% (05 Oct 2021 18:45) (99% - 100%)  ============================I/O===========================  I&O's Detail    04 Oct 2021 07:01  -  05 Oct 2021 07:00  --------------------------------------------------------  IN:    Albumin 5%  - 250 mL: 500 mL    Dexmedetomidine: 297.3 mL    Enteral Tube Flush: 120 mL    IV PiggyBack: 666.5 mL    IV PiggyBack: 200 mL    Miscellaneous Tube Feedin mL    sodium chloride 0.9%: 230 mL    Vasopressin: 2 mL  Total IN: 2415.8 mL    OUT:    Drain (mL): 285 mL    Indwelling Catheter - Urethral (mL): 1095 mL    Lidocaine: 0 mL  Total OUT: 1380 mL    Total NET: 1035.8 mL      05 Oct 2021 07:01  -  05 Oct 2021 19:24  --------------------------------------------------------  IN:    Dexmedetomidine: 284.6 mL    Enteral Tube Flush: 660 mL    IV PiggyBack: 100 mL    IV PiggyBack: 366.6 mL    Miscellaneous Tube Feedin mL    PRBCs (Packed Red Blood Cells): 300 mL    PRBCs (Packed Red Blood Cells): 300 mL    sodium chloride 0.9%: 110 mL    Vasopressin: 6 mL  Total IN: 2377.2 mL    OUT:    Drain (mL): 75 mL    Indwelling Catheter - Urethral (mL): 1020 mL  Total OUT: 1095 mL    Total NET: 1282.2 mL        ============================ LABS =========================                        8.1    14.03 )-----------( 103      ( 05 Oct 2021 00:23 )             26.5     10-05    135  |  104  |  14  ----------------------------<  105<H>  3.8   |  20<L>  |  0.48<L>    Ca    9.3      05 Oct 2021 00:23  Phos  2.3     10-  Mg     1.9     10-    TPro  7.2  /  Alb  4.0  /  TBili  1.3<H>  /  DBili  x   /  AST  16  /  ALT  18  /  AlkPhos  157<H>  10-    LIVER FUNCTIONS - ( 05 Oct 2021 00:23 )  Alb: 4.0 g/dL / Pro: 7.2 g/dL / ALK PHOS: 157 U/L / ALT: 18 U/L / AST: 16 U/L / GGT: x           PT/INR - ( 04 Oct 2021 00:31 )   PT: 13.2 sec;   INR: 1.11 ratio         PTT - ( 04 Oct 2021 00:31 )  PTT:29.8 sec  ABG - ( 05 Oct 2021 17:28 )  pH, Arterial: 7.37  pH, Blood: x     /  pCO2: 37    /  pO2: 203   / HCO3: 21    / Base Excess: -3.5  /  SaO2: 100.0               ======================Micro/Rad/Cardio=================  Culture: Reviewed   CXR: Reviewed  Echo: Reviewed  ======================================================  PAST MEDICAL & SURGICAL HISTORY:  CHF (congestive heart failure)    CAD (coronary artery disease)    Depression    Pleural effusion    History of  novel coronavirus disease (COVID-19)  2020    Hemorrhoids    Bleeding hemorrhoids    Peripheral arterial disease    Claudication    BPH with urinary obstruction    ACC/AHA stage D systolic heart failure    Anticoagulation goal of INR 2.0 to 2.5    Falls    Clavicle fracture    CKD (chronic kidney disease), stage III    Iron deficiency anemia    H/O epistaxis    Vertigo    GI bleed    S/P TVR (tricuspid valve repair)    S/P ventricular assist device    S/P endoscopy      ========================ASSESSMENT ================  Stage D Nonischemic Cardiomyopathy, Status Post HM2 on 2017    Cardiogenic shock  Hemodynamic instability   Acute hypoxemic respiratory failure s/p trach , decannulated on ; Reintubated on    GI bleed , Status Post Enteroscopy   Anemia, in setting of melena S/P 2U PRBC   Chronic Kidney Disease  Stress hyperglycemia   C.diff positive on    Hypovolemic shock  Septic shock  Leukocytosis  GB thickening/percholecystic s/p perc choley by IT   SMA stenosis  Serratia/citrobacter pneumonia   Stenotrophomonas pneumonia   Enterobacter pneumonia   Nasuea/vomiting  Deconditioning    Plan:  ====================== NEUROLOGY=====================  Sedated with IV Precedex to maintain vent synchrony   Tylenol PRN for analgesia   PT as tolerated     acetaminophen    Suspension .. 650 milliGRAM(s) Enteral Tube every 6 hours PRN Mild Pain (1 - 3)  dexMEDEtomidine Infusion 1.5 MICROgram(s)/kG/Hr (23.1 mL/Hr) IV Continuous <Continuous>    ==================== RESPIRATORY======================  Acute hypoxemic respiratory failure s/p #8 shiley trach on ; decannulated on ; Reintubated on ; trach size 6 DCT cuff on 10/4   Continue close monitoring of respiratory rate and breathing pattern, following of ABG’s with A-line monitoring, continuous pulse oximetry monitoring.   CPAP vent weaning as tolerated     Mechanical Ventilation:  Mode: AC/ CMV (Assist Control/ Continuous Mandatory Ventilation)  RR (machine): 12  TV (machine): 500  FiO2: 40  PEEP: 5  ITime: 1  MAP: 9  PIP: 17      ====================CARDIOVASCULAR==================  Stage D Nonischemic Cardiomyopathy, Status Post HM2 on 2017; LVAD settings 9200, flow 6.8   TTE 10/4:  Severely decreased LV systolic function, Diffuse hypokinesis. Mild AR. No pericardial effusion   VT on 10/4, s/p Lidocaine drip, continue close tele monitoring   Pressor support with IV Vasopressin   Holding off on Propranolol for now     vasopressin Infusion 0.017 Unit(s)/Min (1 mL/Hr) IV Continuous <Continuous>  ===================HEMATOLOGIC/ONC ===================  CTA A/P on  +L RP hematoma   Acute blood loss anemia, H/H 8.1/26.5, S/P 2U of pRBCs today and monitor CBC   Thrombocytopenia, possible in setting of sepsis, now starting to trend up, 73->103, Rest of labs not consistent with DIC  Holding coumadin and ASA given recurrent GI bleeding, continue monitoring LDH     ===================== RENAL =========================  Hx of CKD, continue monitoring urine output, I&OS, BUN/Cr   Euvolemic, even fluid balance, currently off diuretics.    Replete lytes PRN. Keep K> 4 and Mg >2  C/w Aldactone for diuresis     spironolactone 12.5 milliGRAM(s) Oral every 12 hours    ==================== GASTROINTESTINAL===================  Tolerating TF Vital AF 1.5, @ goal 40 cc/hr   CT A/P 10/5 neg for intra-abd abcess, severe stenosis of prox SMA and b/l iliac arteries  CTA A/P : Evaluation of the mesenteric vessels is limited by streak artifact from LVAD. There appears to be severe stenosis of the proximal SMA; abdominal mesenteric doppler is recommended for further evaluation. 2.  No small bowel findings to suggest acute mesenteric ischemia. 3.  Focal dissections involving the right and left common iliac arteries.  Mesenteric duplex on 8/15 borderline stenosis of proximal SMA, s/p failed SMA stent, L fem RP hematoma on 9/10; No intervention for cholecystostomy tube/PEJ at this time   Reglan for gut motility  PRN Simethicone for gas  Continue Protonix for stress ulcer prophylaxis.       metoclopramide Injectable 10 milliGRAM(s) IV Push every 8 hours  multivitamin 1 Tablet(s) Oral daily  pantoprazole  Injectable 40 milliGRAM(s) IV Push every 12 hours  simethicone 80 milliGRAM(s) Chew every 8 hours PRN Gas  sodium chloride 0.9%. 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       Per endocrine      - c/w Methimazole, adjust based on labs        - Check Free T4 and total T3     insulin lispro (ADMELOG) corrective regimen sliding scale   SubCutaneous every 6 hours  methimazole 20 milliGRAM(s) Oral <User Schedule>      ========================INFECTIOUS DISEASE================  Afebrile, WBC downtrending 20.51->14.03   Continue trending WBC and monitoring fever curve   CT C/A/P : progressive ISABELLE opacities and L consolidations, multifocal pneumonia  BCx  + Serratia marcescens, BCx 10/1 +Enterobacter cloacae complex, repeat BCx on 10/3 NGTD, c/w Meropenem   repeat BCx sent today, follow up  Vancomycin solution for C.diff prophylaxis  Will f/u antibiotic regimen with ID    meropenem  IVPB 1000 milliGRAM(s) IV Intermittent every 8 hours  vancomycin    Solution 125 milliGRAM(s) Oral every 12 hours      Patient requires continuous monitoring with bedside rhythm monitoring, pulse oximetry monitoring, and continuous central venous and arterial pressure monitoring; and intermittent blood gas analysis.  Care plan discussed with ICU care team.    Patient remained critical, at risk for life threatening decompensation.   I have spent 35 minutes providing acute care with multiple re-evaluations throughout the evening.     By signing my name below, I, Daniela Chowdary, attest that this documentation has been prepared under the direction and in the presence of Kali Mendoza NP.  Electronically signed: Cesia Suresh, 10-05-21 @ 19:24    I, Kali Mendoza NP, personally performed the services described in this documentation. All medical record entries made by the scribe were at my direction and in my presence. I have reviewed the chart and agree that the record reflects my personal performance and is accurate and complete  Electronically signed: Kali Mendoza NP, 10-05-21 @ 19:24

## 2021-10-05 NOTE — PROGRESS NOTE ADULT - ASSESSMENT
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 coli UTIs, driveline drainage (1/7/21) and COVID-19 (back in April 2020)  He was recently seen in clinic where he complained of abdominal pain and dark stools w constipation back in May. He presents to [facility] ER today weakness and fatigue, moderate and + Black stools for three days, on coumadin secondary to warfarin use in the setting of an LVAD. Patient has required transfusions for GIB in the past. Mostly recently back in jan 2021 pt had anemia with dark stools. No interventions was done at that time. However Last Endoscopy was done in July 2020 (negative). Today labs show patient is anemic with H/H of 4.5/16.3,. INR is 8.84 MAP in the 90s,   Returned from endoscopy appearing ill tachypneic with chills with new white out of his left lung      A/p  s/p LVAD placement  admission prolonged following GI bleeding, aspiration event, CDI, VAP, chronic abdominal pain, most recently with retroperitoneal bleeding post attempted stent placement  Acute event while on the floor with possible aspiration and required re-intubation and transfer to cTU  Antibiotics with Meropenem  serratia in blood culture on 9/30  and enterobacter in blood culture 10/1 likely from a GI source given the multiple GNRs grown  Continue Meropenem  plan 2 week course  repeat blood cutlures    Morris Prater MD  [phone]  After 5pm/weekends [phone]               65M PMH ACC/AHA stage D HF due to NICM HM2 LVAD , TV annuloplasty ring 9/12/17 as destination therapy due to severe peripheral artery disease with significant stenosis  SIADH, Depression, CKD-3 with hyperkalemia, past E. coli UTIs, driveline drainage (1/7/21) and COVID-19 (back in April 2020)  He was recently seen in clinic where he complained of abdominal pain and dark stools w constipation back in May. He presents to [facility] ER today weakness and fatigue, moderate and + Black stools for three days, on coumadin secondary to warfarin use in the setting of an LVAD. Patient has required transfusions for GIB in the past. Mostly recently back in jan 2021 pt had anemia with dark stools. No interventions was done at that time. However Last Endoscopy was done in July 2020 (negative). Today labs show patient is anemic with H/H of 4.5/16.3,. INR is 8.84 MAP in the 90s,   Returned from endoscopy appearing ill tachypneic with chills with new white out of his left lung      A/p  s/p LVAD placement  admission prolonged following GI bleeding, aspiration event, CDI, VAP, chronic abdominal pain, most recently with retroperitoneal bleeding post attempted stent placement  Acute event while on the floor with possible aspiration and required re-intubation and transfer to cTU  Antibiotics with Meropenem  serratia in blood culture on 9/30  and enterobacter in blood culture 10/1 likely from a GI source given the multiple GNRs grown but CT scan without evidence of a specific source raises the possiblilty of GI translocation in the setting on ongoing ischemic bowel?  Continue Meropenem  plan 2 week course  repeat blood cutlures sent and pending    Morris Prater MD  [phone]  After 5pm/weekends [phone]

## 2021-10-05 NOTE — PROGRESS NOTE ADULT - ASSESSMENT
Assessment and Recommendation:   · Assessment	  Assessment and recommendation :  Recurrent Acute hypoxic respiratory Failure reintubated on full ventilator support FI02 is 40% S/P tracheostomy   going for tracheostomy today   Acute blood loss anemia S/P multiple  blood transfusion   recurrent  septic shock  weaned on   vasopressin   hemorrhagic shock receiving 2 unit of blood transfusion   pan culture negative on meropenem  , po vancomycin   S/P cholecystostomy tube placement by IR    AF RVR back to regular sinus Rhythm   Non ischemic cardiomyopathy continue ACE inhibitor and B-Blockers   mesenteric ischemia failure to stent SMA , no plan for repeated trial   S/P 3 unit of blood transfusion   Stage D systolic heart failure S/P LVAD HM2   MH2 LVAD  with  TV Annuloplasty  Severe peripheral vascular disease   severe hyperglycemia on insulin coverage    Reglan 10 mg for Gastric Motility   hyperthyroidism on Methimazole 10mg TID   critical care polyneuropathy   Anemia of Acute blood Loss   severe protein caloric malnutrition   Chronic kidney disease stage III  Depressed and withdrawn   resume NG tube feeding   GI prophylaxis with PPI   plan for tracheostomy in AM   Discussed with TCV team   critical care time is 35 minutes

## 2021-10-05 NOTE — PROGRESS NOTE ADULT - ASSESSMENT
50 y/o POD #1 for tracheostomy 2/2 respiratory failure. Pt is doing well, #6 DCT cuffed in place, on the vent, with minimal serosanguinous oozing from stoma, no active bleed. sutures x4 and umbilical tie in place.

## 2021-10-05 NOTE — PROGRESS NOTE ADULT - ATTENDING COMMENTS
S/p tracheostomy. Trach in place and patent. Ventilating well. Will continue to follow. Any trach issues please call ENT [phone].

## 2021-10-05 NOTE — PROGRESS NOTE ADULT - SUBJECTIVE AND OBJECTIVE BOX
RICKY JOINT  MRN#: [id number]  Subjective:  Pulmonary progress  : recurrent Acute hypoxic respiratory Failure ,aspiration pneumonia, NICM  ,   65M PMH ACC/AHA stage D HF due to NICM HM2 LVAD , TV annuloplasty ring 17 as destination therapy due to severe peripheral artery disease with significant stenosis  SIADH, Depression, CKD-3 with hyperkalemia, past E. coli UTIs, driveline drainage (21) and COVID-19 (back in 2020)  He was recently seen in clinic where he complained of abdominal pain and dark stools w constipation back in May. He presents to [facility] ER today weakness and fatigue, moderate and + Black stools for three days, on coumadin secondary to warfarin use in the setting of an LVAD. Patient has required transfusions for GIB in the past. Mostly recently back in 2021 pt had anemia with dark stools. No interventions was done at that time. However Last Endoscopy was done in 2020 (negative). Today labs show patient is anemic with H/H of 4.5/16.3,. INR is 8.84 MAP in the 90s, Temp 35.1. He denies any chest pain, shortness of breath, dizziness, abd pain, nausea or vomiting. found to have  rectal bleeding underwent endoscopy ,old blood in the proximal ileum ,  develop sepsis with LL opacity given Antibiotics , Extubated , reintubated , Bronchoscopy on Zosyn for LL pneumonia  and Amiodarone S/P TV Annuloplasty , patient remain intubated on full ventilatory support .S/P multiple units of blood transfusion , remain on full ventilatory support on Precedex and propofol , new central IJ line , diarrhea C diff. +ve on po vancomycin and IV Flagyl,  mildly distended belly , fever start on cefepime 2gm q 8 hrs S/P tracheostomy .  new RT Subclavian central line continue on contact  isolation ,S/P  C-diff antibiotics, no more diarrhea back on full support mechanical ventilator , chest x ray show improvement in LLL air space disease, more awake and responsive on tube feeding no more diarrhea ,  no nausea or vomiting or diarrhea still very weak and tired , back on tube feeding ,still on po vancomycin , getting PT and OT at bed side ,   , no SOB getting stronger , improve muscle tone patient transfer to monitor bed still on contact isolation for C-Difficel colitis on 50% FI02, and change to Suresh  tube feeding still loose stool . H/H drop significantly require blood transfusion , most likely GI bleeding , IV heparin D/C ,  H/H is stable ., patient develop TR sided  pneumothorax require chest tube placement , RT IJ central line  placed , develop fever shaking chills , blood culture positive for serratia marcescens , start on cefepime .the patient  become hypoxic and hypotensive placed on full ventilatory support and Vasopressin , levophed and Dobutamine ,S/P blood transfusion on meropenem and vancomycin ,   , on and  off pressors , occasional agitation on Precedex .S/P IR cholecystostomy tube drainage placement in the RT upper Quadrant , resume anticoagulation chest x ray noted C-PAP trail lasted only for 2 hrs , new RT SC line and D/C RT IJ line , RT pig tail cathter has been removed , tolerating C-PAP trial placed on trach. collar 50% FI02 GI consultant noted on NG tube feeding as tolerated , develop AF RVR S/P  bolus Amiodarone  back to regular sinus Rhythm , Flat Affect depressed , back on tube feeding Vital AF at 60 cc/ hr .still intermittent abdominal pain , no fever saturation is accepted  back on full ventilatory support ,   on methimazole for hyperthyroidism ,  condition is the same ,still C/O Abdominal pain , white count is improving , no chest pain or SOB ,  .placed on Reglan 10 mg TID for gastric motility , depressed , withdrawn. , S/P  mesenteric angiogram , unable to stent SMA S/P 3 units of blood transfusion   RT IJ in place reassessed for stent in the SMA by vascular,  dark stool stable H/H ,surgical opinion for possible Lap cholecystectomy. over night events noted develop respiratory distress, , rectal bleeding, require intubation placed on full ventilatory support , FI02 is 50% also became hemianosmically unstable require triple pressor support with levophed , vasopressin and norsynephrine, pan culture placed  on Vancomycin IV and PO  and Zosyn, sedated with propofol kept NPO , new central line RT and left IJ cathter placed . Diflucan Added .fever of 38.5 weaned off  vasopressin , all culture are negative, resume tube feeding ,  white count is improving , on meropenem, vancomycin solution  , on Precedex, no over night events , S/P  tracheostomy , bleeding receiving blood transfusion on vasopressin      (2021 16:57)    PAST MEDICAL & SURGICAL HISTORY:  CHF (congestive heart failure)    CAD (coronary artery disease)  Depression    Pleural effusion    History of 2019 novel coronavirus disease (COVID-19)  2020    Hemorrhoids    Bleeding hemorrhoids    Peripheral arterial disease    Claudication    BPH with urinary obstruction    ACC/AHA stage D systolic heart failure  Anticoagulation goal of INR 2.0 to 2.5    Falls    Clavicle fracture    CKD (chronic kidney disease), stage III    Iron deficiency anemia    H/O epistaxis    Vertigo    GI bleed    S/P TVR (tricuspid valve repair)    S/P ventricular assist device    S/P endoscopy    OBJECTIVE:  ICU Vital Signs Last 24 Hrs  T(C): 38.2 (2021 10:00), Max: 38.5 (2021 12:00)  T(F): 100.8 (2021 10:00), Max: 101.3 (2021 12:00)  HR: 65 (2021 10:00) (61 - 69)  BP: --  BP(mean): --  ABP: 105/67 (2021 10:00) (90/54 - 113/64)  ABP(mean): 77 (2021 10:00) (63 - 77)  RR: 20 (2021 10:00) (19 - 35)  SpO2: 99% (2021 10:00) (96% - 100%)       @ 07: @ 07:00  --------------------------------------------------------  IN: 2693.9 mL / OUT: 1415 mL / NET: 1278.9 mL     @ 07: @ 10:49  --------------------------------------------------------  IN: 420.8 mL / OUT: 115 mL / NET: 305.8 mL  PHYSICAL EXAM: Daily   Elderly male intubated on full ventilatory support FI02 is 40% S/P tracheostomy   on  vasopressin , Precedex , bllod transfusion   Daily Weight in k.4 (2021 00:00)  HEENT:     + NCAT  + EOMI  - Conjuctival edema   - Icterus   - Thrush   - ETT  + NGT/OGT  Neck:         + FROM  RT IJ , LT IJ  lines JVD  - Nodes - Masses + Mid-line trachea + Tracheostomy  Chest:            normal A-P diameter    Lungs:          + CTA   + Rhonchi    - Rales    - Wheezing + Decreased  LT BS   - Dullness R L  Cardiac:       + S1 + S2    + RRR   - Irregular   - S3  - S4    - Murmurs   - Rub   - Hamman’s sign   Abdomen:    + BS  + Soft + Non-tender - Distended - Organomegaly - PEG .cholecystostomy tube in place  Extremities:   - Cyanosis U/L   - Clubbing  U/L  + LE/UE Edema   + Capillary refill    + Pulses   Neuro:        - Awake   -  Alert   - Confused   - Lethargic   + Sedated  + Generalized Weakness  Skin:        - Rashes    - Erythema   + Normal incisions   + IV sites intact          HOSPITAL MEDICATIONS: All medications reviewed and analyzed  MEDICATIONS  (STANDING):  amiodarone    Tablet 200 milliGRAM(s) Oral daily  chlorhexidine 0.12% Liquid 15 milliLiter(s) Oral Mucosa every 12 hours  chlorhexidine 2% Cloths 1 Application(s) Topical <User Schedule>  dexmedetomidine Infusion 0.5 MICROgram(s)/kG/Hr (9.81 mL/Hr) IV Continuous <Continuous>  dextrose 50% Injectable 50 milliLiter(s) IV Push every 15 minutes  heparin  Infusion 400 Unit(s)/Hr (12.5 mL/Hr) IV Continuous <Continuous>  Hydromorphone  Injectable 0.5 milliGRAM(s) IV Push once  insulin lispro (ADMELOG) corrective regimen sliding scale   SubCutaneous every 6 hours  pantoprazole  Injectable 40 milliGRAM(s) IV Push every 12 hours  piperacillin/tazobactam IVPB.. 3.375 Gram(s) IV Intermittent every 8 hours  propofol Infusion 20 MICROgram(s)/kG/Min (9.42 mL/Hr) IV Continuous <Continuous>  sodium chloride 0.9% lock flush 3 milliLiter(s) IV Push every 8 hours  sodium chloride 0.9%. 1000 milliLiter(s) (10 mL/Hr) IV Continuous <Continuous>    MEDICATIONS  (PRN):  acetaminophen    Suspension .. 650 milliGRAM(s) Oral every 6 hours PRN Temp greater or equal to 38C (100.4F)    LABS: All Lab data reviewed and analyzed                        9.4    19.50 )-----------( 57       ( 03 Oct 2021 01:10 )             30.7    10    135  |  103  |  15  ----------------------------<  179<H>  4.1   |  21<L>  |  0.35<L>    Ca    9.1      03 Oct 2021 14:17  Phos  2.5     10-  Mg     2.1     10    TPro  6.8  /  Alb  3.5  /  TBili  1.5<H>  /  DBili  x   /  AST  19  /  ALT  19  /  AlkPhos  178<H>  10      Ca    9.9      02 Oct 2021 00:34  Phos  2.5     10-  Mg     1.7     10-02    TPro  6.8  /  Alb  3.3  /  TBili  2.1<H>  /  DBili  x   /  AST  14  /  ALT  17  /  AlkPhos  152<H>  10-02    Ca    9.6      01 Oct 2021 01:25  Phos  3.5     10-  Mg     1.9     10-01    TPro  6.6  /  Alb  3.3  /  TBili  4.0<H>  /  DBili  x   /  AST  15  /  ALT  16  /  AlkPhos  150<H>  10-01                                                                                                                                                                  PTT - ( 2021 04:52 )  PTT:45.2 sec LIVER FUNCTIONS - ( 2021 00:42 )  Alb: 3.4 g/dL / Pro: 6.7 g/dL / ALK PHOS: 213 U/L / ALT: 15 U/L / AST: 24 U/L / GGT: x           RADIOLOGY: - Reviewed and analyzed RT Pig tail cathter  , LVAD HM2, CT scan of abdomen reviewed result noted

## 2021-10-05 NOTE — PROGRESS NOTE ADULT - SUBJECTIVE AND OBJECTIVE BOX
ENT ISSUE/POD: POD #1 for trach     INTERVAL HPI: 67 y/o male POD #1 for tracheostomy #6 DCT cuffed in place 2/2 respiratory failure. Pt is doing well on the vent. No reported issues.     PAST MEDICAL & SURGICAL HISTORY:  CHF (congestive heart failure)    CAD (coronary artery disease)    Depression    Pleural effusion    History of 2019 novel coronavirus disease (COVID-19)  april 2020    Hemorrhoids    Bleeding hemorrhoids    Peripheral arterial disease    Claudication    BPH with urinary obstruction    ACC/AHA stage D systolic heart failure    Anticoagulation goal of INR 2.0 to 2.5    Falls    Clavicle fracture    CKD (chronic kidney disease), stage III    Iron deficiency anemia    H/O epistaxis    Vertigo    GI bleed    S/P TVR (tricuspid valve repair)    S/P ventricular assist device    S/P endoscopy      Allergies    Amiodarone Hydrochloride (Other (Severe))    Intolerances      MEDICATIONS  (STANDING):  chlorhexidine 0.12% Liquid 15 milliLiter(s) Oral Mucosa two times a day  chlorhexidine 2% Cloths 1 Application(s) Topical <User Schedule>  dexMEDEtomidine Infusion 1.5 MICROgram(s)/kG/Hr (23.1 mL/Hr) IV Continuous <Continuous>  insulin lispro (ADMELOG) corrective regimen sliding scale   SubCutaneous every 6 hours  meropenem  IVPB 1000 milliGRAM(s) IV Intermittent every 8 hours  methimazole 20 milliGRAM(s) Oral <User Schedule>  metoclopramide Injectable 10 milliGRAM(s) IV Push every 8 hours  multivitamin 1 Tablet(s) Oral daily  pantoprazole  Injectable 40 milliGRAM(s) IV Push every 12 hours  sodium chloride 0.9%. 1000 milliLiter(s) (10 mL/Hr) IV Continuous <Continuous>  thiamine 100 milliGRAM(s) Oral daily  vancomycin    Solution 125 milliGRAM(s) Oral every 12 hours  vasopressin Infusion 0.017 Unit(s)/Min (1 mL/Hr) IV Continuous <Continuous>    MEDICATIONS  (PRN):  acetaminophen    Suspension .. 650 milliGRAM(s) Enteral Tube every 6 hours PRN Mild Pain (1 - 3)  simethicone 80 milliGRAM(s) Chew every 8 hours PRN Gas    ROS:   Unable to obtain due to pts clinical condition       Vital Signs Last 24 Hrs  T(C): 36.8 (05 Oct 2021 04:00), Max: 37 (04 Oct 2021 12:00)  T(F): 98.2 (05 Oct 2021 04:00), Max: 98.6 (04 Oct 2021 12:00)  HR: 66 (05 Oct 2021 08:30) (59 - 119)  BP: --  BP(mean): --  RR: 15 (05 Oct 2021 08:30) (13 - 32)  SpO2: 100% (05 Oct 2021 08:30) (99% - 100%)                          8.1    14.03 )-----------( 103      ( 05 Oct 2021 00:23 )             26.5    10-05    135  |  104  |  14  ----------------------------<  105<H>  3.8   |  20<L>  |  0.48<L>    Ca    9.3      05 Oct 2021 00:23  Phos  2.3     10-05  Mg     1.9     10-05    TPro  7.2  /  Alb  4.0  /  TBili  1.3<H>  /  DBili  x   /  AST  16  /  ALT  18  /  AlkPhos  157<H>  10-05   PT/INR - ( 04 Oct 2021 00:31 )   PT: 13.2 sec;   INR: 1.11 ratio         PTT - ( 04 Oct 2021 00:31 )  PTT:29.8 sec    PHYSICAL EXAM:  Gen: NAD  Head: Normocephalic, Atraumatic  Face: no edema/erythema/fluctuance  Eyes:  no scleral injection  Nose: Nares bilaterally patent, no discharge  Mouth: No Stridor / Drooling / Trismus.  Mucosa moist, tongue/uvula midline, oropharynx clear  Neck: #6 DCT inflated cuff with minimal serosanguinous oozing from stoma, no active bleeding, sutures x4 and umbilical tie in place.  No lymphadenopathy, trachea midline, no masses  Resp: ventilating well  CV: no peripheral edema/cyanosis

## 2021-10-05 NOTE — PROGRESS NOTE ADULT - SUBJECTIVE AND OBJECTIVE BOX
INFECTIOUS DISEASES FOLLOW UP-- Anitra Prater  [phone]    This is a follow up note for this  66yMale with  Anemia    Acute cholecystitis        ROS:  CONSTITUTIONAL:  No fever, good appetite  CARDIOVASCULAR:  No chest pain or palpitations  RESPIRATORY:  No dyspnea  GASTROINTESTINAL:  No nausea, vomiting, diarrhea, or abdominal pain  GENITOURINARY:  No dysuria  NEUROLOGIC:  No headache,     Allergies    Amiodarone Hydrochloride (Other (Severe))    Intolerances        ANTIBIOTICS/RELEVANT:  antimicrobials  meropenem  IVPB 1000 milliGRAM(s) IV Intermittent every 8 hours  vancomycin    Solution 125 milliGRAM(s) Oral every 12 hours    immunologic:    OTHER:  acetaminophen    Suspension .. 650 milliGRAM(s) Enteral Tube every 6 hours PRN  chlorhexidine 0.12% Liquid 15 milliLiter(s) Oral Mucosa two times a day  chlorhexidine 2% Cloths 1 Application(s) Topical <User Schedule>  dexMEDEtomidine Infusion 1.5 MICROgram(s)/kG/Hr IV Continuous <Continuous>  insulin lispro (ADMELOG) corrective regimen sliding scale   SubCutaneous every 6 hours  methimazole 20 milliGRAM(s) Oral <User Schedule>  metoclopramide Injectable 10 milliGRAM(s) IV Push every 8 hours  multivitamin 1 Tablet(s) Oral daily  pantoprazole  Injectable 40 milliGRAM(s) IV Push every 12 hours  simethicone 80 milliGRAM(s) Chew every 8 hours PRN  sodium chloride 0.9%. 1000 milliLiter(s) IV Continuous <Continuous>  spironolactone 12.5 milliGRAM(s) Oral every 12 hours  thiamine 100 milliGRAM(s) Oral daily  vasopressin Infusion 0.017 Unit(s)/Min IV Continuous <Continuous>      Objective:  Vital Signs Last 24 Hrs  T(C): 36.3 (05 Oct 2021 16:00), Max: 37 (05 Oct 2021 08:00)  T(F): 97.3 (05 Oct 2021 16:00), Max: 98.6 (05 Oct 2021 08:00)  HR: 72 (05 Oct 2021 18:45) (53 - 73)  BP: --  BP(mean): --  RR: 18 (05 Oct 2021 18:45) (9 - 24)  SpO2: 100% (05 Oct 2021 18:45) (99% - 100%)    PHYSICAL EXAM:  Constitutional:no acute distress  Eyes:ALONSO, EOMI  Ear/Nose/Throat: no oral lesions, 	  Respiratory: clear BL  Cardiovascular: S1S2  Gastrointestinal:soft, (+) BS, no tenderness  Extremities:no e/e/c  No Lymphadenopathy  IV sites not inflammed.    LABS:                        8.1    14.03 )-----------( 103      ( 05 Oct 2021 00:23 )             26.5     10-05    135  |  104  |  14  ----------------------------<  105<H>  3.8   |  20<L>  |  0.48<L>    Ca    9.3      05 Oct 2021 00:23  Phos  2.3     10-05  Mg     1.9     10-05    TPro  7.2  /  Alb  4.0  /  TBili  1.3<H>  /  DBili  x   /  AST  16  /  ALT  18  /  AlkPhos  157<H>  10-05    PT/INR - ( 04 Oct 2021 00:31 )   PT: 13.2 sec;   INR: 1.11 ratio         PTT - ( 04 Oct 2021 00:31 )  PTT:29.8 sec      MICROBIOLOGY:            RECENT CULTURES:  10-03 @ 21:21  .Blood Blood  --  --  --    No growth to date.  --  10-01 @ 05:42  .Blood Blood-Peripheral  --  --  --    No growth to date.  --  10-01 @ 05:41  .Blood Blood-Peripheral  Enterobacter cloacae complex  Enterobacter cloacae complex  CLAU    Growth in anaerobic bottle: Enterobacter cloacae complex  --  09-30 @ 16:56  .Blood Blood-Peripheral  Blood Culture PCR  Serratia marcescens  Blood Culture PCR  PCR    Growth in aerobic and anaerobic bottles: Serratia marcescens  See previous culture [id number]  --      RADIOLOGY & ADDITIONAL STUDIES: INFECTIOUS DISEASES FOLLOW UP-- Anitra Prater  [phone]    This is a follow up note for this  66yMale with  Anemia     cholecystitis s/p cholecystotomy tube earlier in his admission        ROS:  CONSTITUTIONAL: awake, alert    Allergies    Amiodarone Hydrochloride (Other (Severe))    Intolerances        ANTIBIOTICS/RELEVANT:  antimicrobials  meropenem  IVPB 1000 milliGRAM(s) IV Intermittent every 8 hours  vancomycin    Solution 125 milliGRAM(s) Oral every 12 hours    immunologic:    OTHER:  acetaminophen    Suspension .. 650 milliGRAM(s) Enteral Tube every 6 hours PRN  chlorhexidine 0.12% Liquid 15 milliLiter(s) Oral Mucosa two times a day  chlorhexidine 2% Cloths 1 Application(s) Topical <User Schedule>  dexMEDEtomidine Infusion 1.5 MICROgram(s)/kG/Hr IV Continuous <Continuous>  insulin lispro (ADMELOG) corrective regimen sliding scale   SubCutaneous every 6 hours  methimazole 20 milliGRAM(s) Oral <User Schedule>  metoclopramide Injectable 10 milliGRAM(s) IV Push every 8 hours  multivitamin 1 Tablet(s) Oral daily  pantoprazole  Injectable 40 milliGRAM(s) IV Push every 12 hours  simethicone 80 milliGRAM(s) Chew every 8 hours PRN  sodium chloride 0.9%. 1000 milliLiter(s) IV Continuous <Continuous>  spironolactone 12.5 milliGRAM(s) Oral every 12 hours  thiamine 100 milliGRAM(s) Oral daily  vasopressin Infusion 0.017 Unit(s)/Min IV Continuous <Continuous>      Objective:  Vital Signs Last 24 Hrs  T(C): 36.3 (05 Oct 2021 16:00), Max: 37 (05 Oct 2021 08:00)  T(F): 97.3 (05 Oct 2021 16:00), Max: 98.6 (05 Oct 2021 08:00)  HR: 72 (05 Oct 2021 18:45) (53 - 73)  BP: --  BP(mean): --  RR: 18 (05 Oct 2021 18:45) (9 - 24)  SpO2: 100% (05 Oct 2021 18:45) (99% - 100%)    PHYSICAL EXAM:  Constitutional: c/o abdominal pain, nausea  Eyes:ALONSO, EOMI  Ear/Nose/Throat: no oral lesions, trach site dried blood	  Respiratory: audible bilat  Cardiovascular: I2C2PWL sounds  Gastrointestinal: cholecystotomy tube draining bilious fluid, tender mid abdomen  Extremities:no e/e/c  No Lymphadenopathy  IV sites not inflammed.    LABS:                        8.1    14.03 )-----------( 103      ( 05 Oct 2021 00:23 )             26.5     10-05    135  |  104  |  14  ----------------------------<  105<H>  3.8   |  20<L>  |  0.48<L>    Ca    9.3      05 Oct 2021 00:23  Phos  2.3     10-05  Mg     1.9     10-05    TPro  7.2  /  Alb  4.0  /  TBili  1.3<H>  /  DBili  x   /  AST  16  /  ALT  18  /  AlkPhos  157<H>  10-05    PT/INR - ( 04 Oct 2021 00:31 )   PT: 13.2 sec;   INR: 1.11 ratio         PTT - ( 04 Oct 2021 00:31 )  PTT:29.8 sec      MICROBIOLOGY:            RECENT CULTURES:  10-03 @ 21:21  .Blood Blood  --  --  --    No growth to date.  --  10-01 @ 05:42  .Blood Blood-Peripheral  --  --  --    No growth to date.  --  10-01 @ 05:41  .Blood Blood-Peripheral  Enterobacter cloacae complex  Enterobacter cloacae complex  CLAU    Growth in anaerobic bottle: Enterobacter cloacae complex  --  09-30 @ 16:56  .Blood Blood-Peripheral  Blood Culture PCR  Serratia marcescens  Blood Culture PCR  PCR    Growth in aerobic and anaerobic bottles: Serratia marcescens  See previous culture [id number]  --      RADIOLOGY & ADDITIONAL STUDIES:    < from: CT Abdomen and Pelvis w/ Oral Cont and w/ IV Cont (10.05.21 @ 15:28) >  IMPRESSION: No evidence of intra-abdominal abscess.    < end of copied text >

## 2021-10-05 NOTE — PROGRESS NOTE ADULT - SUBJECTIVE AND OBJECTIVE BOX
RICKY HAMPTON  MRN-[id number]  Patient is a 66y old  Male who presents with a chief complaint of Anemia, Supratherapeutic INR, Dark Stools (04 Oct 2021 21:30)    HPI:  65M PMH ACC/AHA stage D HF due to NICM HM2 LVAD , TV annuloplasty ring 17 as destination therapy due to severe peripheral artery disease with significant stenosis  SIADH, Depression, CKD-3 with hyperkalemia, past E. coli UTIs, driveline drainage (21) and COVID-19 (back in 2020)  He was recently seen in clinic where he complained of abdominal pain and dark stools w constipation back in May. He presents to [facility] ER today weakness and fatigue, moderate and + Black stools for three days, on coumadin secondary to warfarin use in the setting of an LVAD. Patient has required transfusions for GIB in the past. Mostly recently back in 2021 pt had anemia with dark stools. No interventions was done at that time. However Last Endoscopy was done in 2020 (negative). Today labs show patient is anemic with H/H of 4.5/16.3,. INR is 8.84 MAP in the 90s, Temp 35.1. He denies any chest pain, shortness of breath, dizziness, abd pain, nausea or vomiting.       (2021 16:57)      Surgery/Hospital Course:   admit for melena w/ anemia, INR 8.84   6/15 Capsul study (+) for small bowel bleed, balloon endoscopy (old blood in prox ileum); post EGD - septic w/ L opacity, re-intubated for concern for aspiration, TTE (Mod MR, decrease biV w/ interventricular septum boweing towards R)   bronch    +C Diff    CT C/A/P: Fluid filled colon which may be 2/2 rapid transit. Small bilateral pleffs with associates. Compressive atelectasis New ISABELLE & LLL  parenchymal opacities, suspicious for pneumonia. Moderate stenosis in the proximal superior mesenteric artery.    #8 Shiley trach at bedside    LVAD speed increased to 9200   Bronch   TC since . Patient transferred to SDU.    INR today 2.64.  H&H 7.3/24 this AM.  Will repeat CBC at noon, and will send stool guaiac Patient with persistent abdominal tenderness, rate of tube feeds decreased.  No nausea/vomiting.     INR today 2.4. H&H 9.1/28.6 low flow overnight /N&V, refusing Tube feeds on D5 1/2 normal  @50 cc/hr   INR 2.69  H&H 7.7/.1 refusing Tube feeds on D5 1/2 normal  @50 cc/hr. This am + BM Melena Dr Oniell HF  aware- PRBC x1  GI team consulted -  NPO  plan on study in am-  D/w Dr Cadet Patient  to return to CTU for further management; 1PRBCS    Post op INR 2.2 today.  No bleeding. BC + for SM.  Pt is hypotensive requiring pressor and inotropic support.  ID follow up today on Cefepime will follow.   R PTC for PTX    CT C/A/P: sub q emphysema in R chest wall, GGO RUL, small ascites CTH negative; Abd US: GB thickening, pericholecystic fluid     Perchole drain in place continues to drain total output overnight 133.  Fever today 38.8    duplex LE negative    Patient with persistent abdominal pain, refusing tube feeds and medications, Psych consulted   CTA A/P ordered to r/o mesenteric ischemia 2/2 persistent anorexia, nausea, vomiting. Revealed:  Evaluation of the mesenteric vessels is limited by streak artifact from LVAD. There appears to be severe stenosis of the proximal SMA; abdominal mesenteric doppler is recommended for further evaluation. 2.  No small bowel findings to suggest acute mesenteric ischemia. 3.  Focal dissections involving the right and left common iliac arteries.  8/15: Cultures resulted BC 1/2 +GPC in clusters, SC enterobacter; mesenteric duplex: borderline stenosis of proximal SMA  : CT C/A/P noncon: Nondular opacities in R lung apex w/cavitation, abd nl  :  Continue current care, treatment of thyrotoxicosis with medications as per endocrine, d/c ABX as per team.    RUQ sono: Contracted gallbladder with cholecystostomy tube in place.  9/10 failed SMA stent, L fem PSA, s/p 3U PRCB   CTA Abd/Pelvis L RP hematoma    Trach decannulated    intubated fro resp distress for increased WOB, LL pneumonia; CT C/A/P: progressive ISABELLE opacities and L consolidations, multifocal pneumonia    TTE (EF 25%, decreased RV, mod MR)   10/3 VT -> Lidocaine gtt   10/4 Trach size 6 DCT cuff    Today:    REVIEW OF SYSTEMS:  Unable to obtain, pt intubated and sedated     ICU Vital Signs Last 24 Hrs  T(C): 36.8 (05 Oct 2021 04:00), Max: 37 (04 Oct 2021 12:00)  T(F): 98.2 (05 Oct 2021 04:00), Max: 98.6 (04 Oct 2021 12:00)  HR: 67 (05 Oct 2021 07:00) (59 - 119)  BP: --  BP(mean): --  ABP: 92/69 (05 Oct 2021 07:00) (61/56 - 98/73)  ABP(mean): 78 (05 Oct 2021 07:00) (58 - 85)  RR: 17 (05 Oct 2021 07:00) (13 - 32)  SpO2: 100% (05 Oct 2021 07:00) (100% - 100%)      Physical Exam:  Gen:  intubated, NAD  CNS: moves all extremities to command on low dose sedation   Neck: no JVD, +trach   RES : coarse breath sounds, no wheezing    CVS: +LVAD hum   Abd: Soft. Sybil drain with bilious fluid. Positive BS throughout. Mild RUQ abdominal tenderness by perc sybil.  : Landrum intact draining urine without hematuria.  Skin: No rash, erythema, cyanosis.  Vasc: Warm and well-perfused.  Ext:  no edema    ============================I/O===========================   I&O's Detail    04 Oct 2021 07:01  -  05 Oct 2021 07:00  --------------------------------------------------------  IN:    Albumin 5%  - 250 mL: 500 mL    Dexmedetomidine: 297.3 mL    Enteral Tube Flush: 120 mL    IV PiggyBack: 200 mL    IV PiggyBack: 666.5 mL    Miscellaneous Tube Feedin mL    sodium chloride 0.9%: 230 mL    Vasopressin: 2 mL  Total IN: 2415.8 mL    OUT:    Drain (mL): 285 mL    Indwelling Catheter - Urethral (mL): 1095 mL    Lidocaine: 0 mL  Total OUT: 1380 mL    Total NET: 1035.8 mL        ============================ LABS =========================                        8.1    14.03 )-----------( 103      ( 05 Oct 2021 00:23 )             26.5     10-05    135  |  104  |  14  ----------------------------<  105<H>  3.8   |  20<L>  |  0.48<L>    Ca    9.3      05 Oct 2021 00:23  Phos  2.3     10-05  Mg     1.9     10-05    TPro  7.2  /  Alb  4.0  /  TBili  1.3<H>  /  DBili  x   /  AST  16  /  ALT  18  /  AlkPhos  157<H>  10-05    LIVER FUNCTIONS - ( 05 Oct 2021 00:23 )  Alb: 4.0 g/dL / Pro: 7.2 g/dL / ALK PHOS: 157 U/L / ALT: 18 U/L / AST: 16 U/L / GGT: x           PT/INR - ( 04 Oct 2021 00:31 )   PT: 13.2 sec;   INR: 1.11 ratio         PTT - ( 04 Oct 2021 00:31 )  PTT:29.8 sec  ABG - ( 04 Oct 2021 23:56 )  pH, Arterial: 7.43  pH, Blood: x     /  pCO2: 33    /  pO2: 194   / HCO3: 22    / Base Excess: -2.0  /  SaO2: 100.0               ======================Micro/Rad/Cardio=================  Culture: Reviewed   CXR: Reviewed  Echo:Reviewed  ======================================================  PAST MEDICAL & SURGICAL HISTORY:  CHF (congestive heart failure)    CAD (coronary artery disease)    Depression    Pleural effusion    History of  novel coronavirus disease (COVID-19)  2020    Hemorrhoids    Bleeding hemorrhoids    Peripheral arterial disease    Claudication    BPH with urinary obstruction    ACC/AHA stage D systolic heart failure    Anticoagulation goal of INR 2.0 to 2.5    Falls    Clavicle fracture    CKD (chronic kidney disease), stage III    Iron deficiency anemia    H/O epistaxis    Vertigo    GI bleed    S/P TVR (tricuspid valve repair)    S/P ventricular assist device    S/P endoscopy      ====================ASSESSMENT ==============  Stage D Nonischemic Cardiomyopathy, Status Post HM2 on 2017    Cardiogenic shock  Hemodynamic instability   Acute hypoxemic respiratory failure s/p trach , decannulated on ; Reintubated on    GI bleed , Status Post Enteroscopy   Anemia, in setting of melena   Chronic Kidney Disease  Stress hyperglycemia   C.diff positive on    Hypovolemic shock  Septic shock  Leukocytosis  GB thickening/percholecystic s/p perc choley by IT   SMA stenosis  Serratia/citrobacter pneumonia   Stenotrophomonas pneumonia   Enterobacter pneumonia   Nasuea/vomiting  Deconditioning    Plan:  ====================== NEUROLOGY=====================  Sedated with IV Precedex to maintain vent synchrony   Tylenol PRN for analgesia     acetaminophen    Suspension .. 650 milliGRAM(s) Enteral Tube every 6 hours PRN Mild Pain (1 - 3)  dexMEDEtomidine Infusion 1.5 MICROgram(s)/kG/Hr (23.1 mL/Hr) IV Continuous <Continuous>    ==================== RESPIRATORY======================  Acute hypoxemic respiratory failure s/p #8 shiley trach on ; decannulated on ; Reintubated on ; trach size 6 DCT cuff on 10/4   Continue close monitoring of respiratory rate and breathing pattern, following of ABG’s with A-line monitoring, continuous pulse oximetry monitoring.     Mechanical Ventilation:  Mode: AC/ CMV (Assist Control/ Continuous Mandatory Ventilation)  RR (machine): 16  TV (machine): 500  FiO2: 40  PEEP: 5  ITime: 1  MAP: 10  PIP: 21      ====================CARDIOVASCULAR==================  Stage D Nonischemic Cardiomyopathy, Status Post HM2 on 2017; LVAD settings 9200, flow 5.6   TTE 10/4:  Severely decreased LV systolic function, Diffuse hypokinesis. Mild AR. No pericardial effusion   VT on 10/4, s/p Lidocaine drip, continue close tele monitoring   Pressor support with IV Vasopressin   Holding off on Propranolol for now     vasopressin Infusion 0.017 Unit(s)/Min (1 mL/Hr) IV Continuous <Continuous>    ===================HEMATOLOGIC/ONC ===================  CTA A/P on  +L RP hematoma   Acute blood loss anemia, monitor H&H/Plts    Thrombocytopenia, possible in setting of sepsis, now starting to trend up, 73->103, Rest of labs not consistent with DIC  Holding coumadin and ASA given recurrent GI bleeding, continue monitoring LDH     ===================== RENAL =========================  Hx of CKD, continue monitoring urine output, I&OS, BUN/Cr   Euvolemic, even fluid balance, currently off diuretics.    Replete lytes PRN. Keep K> 4 and Mg >2    ==================== GASTROINTESTINAL===================  Tolerating TF Vital AF 1.5, @ goal 40 cc/hr   CTA A/P : Evaluation of the mesenteric vessels is limited by streak artifact from LVAD. There appears to be severe stenosis of the proximal SMA; abdominal mesenteric doppler is recommended for further evaluation. 2.  No small bowel findings to suggest acute mesenteric ischemia. 3.  Focal dissections involving the right and left common iliac arteries.  Mesenteric duplex on 8/15 borderline stenosis of proximal SMA, s/p failed SMA stent, L fem RP hematoma on 9/10; No intervention for cholecystostomy tube/PEJ at this time   Reglan for gut motility  PRN Simethicone for gas    multivitamin 1 Tablet(s) Oral daily  GI prophylaxis, pantoprazole  Injectable 40 milliGRAM(s) IV Push every 12 hours  simethicone 80 milliGRAM(s) Chew every 8 hours PRN Gas  sodium chloride 0.9%. 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       Per endocrine      - c/w Methimazole, adjust based on labs        - Check Free T4 and total T3     insulin lispro (ADMELOG) corrective regimen sliding scale   SubCutaneous every 6 hours  methimazole 20 milliGRAM(s) Oral <User Schedule>    ========================INFECTIOUS DISEASE================  Afebrile, WBC downtrending 20.51->14.03   Continue trending WBC and monitoring fever curve   CT C/A/P : progressive ISABELLE opacities and L consolidations, multifocal pneumonia  BCx  + Serratia marcescens, BCx 10/1 +Enterobacter cloacae complex, c/w Meropenem   Vancomycin solution for C.diff prophylaxis    meropenem  IVPB 1000 milliGRAM(s) IV Intermittent every 8 hours  vancomycin    Solution 125 milliGRAM(s) Oral every 12 hours          Patient requires continuous monitoring with bedside rhythm monitoring, pulse ox monitoring, and intermittent blood gas analysis. Care plan discussed with ICU care team. Patient remained critical and at risk for life threatening decompensation.     By signing my name below, IAgnieszka, attest that this documentation has been prepared under the direction and in the presence of CLAUDIA Lee   Electronically signed: Romel Shaffer, 10-05-21 @ 08:00    I, Georgina Perez, personally performed the services described in this documentation. all medical record entries made by the romel were at my direction and in my presence. I have reviewed the chart and agree that the record reflects my personal performance and is accurate and complete  Electronically signed: CLAUDIA Lee      RICKY HAMPTON  MRN-[id number]  Patient is a 66y old  Male who presents with a chief complaint of Anemia, Supratherapeutic INR, Dark Stools (04 Oct 2021 21:30)    HPI:  65M PMH ACC/AHA stage D HF due to NICM HM2 LVAD , TV annuloplasty ring 17 as destination therapy due to severe peripheral artery disease with significant stenosis  SIADH, Depression, CKD-3 with hyperkalemia, past E. coli UTIs, driveline drainage (21) and COVID-19 (back in 2020)  He was recently seen in clinic where he complained of abdominal pain and dark stools w constipation back in May. He presents to [facility] ER today weakness and fatigue, moderate and + Black stools for three days, on coumadin secondary to warfarin use in the setting of an LVAD. Patient has required transfusions for GIB in the past. Mostly recently back in 2021 pt had anemia with dark stools. No interventions was done at that time. However Last Endoscopy was done in 2020 (negative). Today labs show patient is anemic with H/H of 4.5/16.3,. INR is 8.84 MAP in the 90s, Temp 35.1. He denies any chest pain, shortness of breath, dizziness, abd pain, nausea or vomiting.       (2021 16:57)      Surgery/Hospital Course:   admit for melena w/ anemia, INR 8.84   6/15 Capsul study (+) for small bowel bleed, balloon endoscopy (old blood in prox ileum); post EGD - septic w/ L opacity, re-intubated for concern for aspiration, TTE (Mod MR, decrease biV w/ interventricular septum boweing towards R)   bronch    +C Diff    CT C/A/P: Fluid filled colon which may be 2/2 rapid transit. Small bilateral pleffs with associates. Compressive atelectasis New ISABELLE & LLL  parenchymal opacities, suspicious for pneumonia. Moderate stenosis in the proximal superior mesenteric artery.    #8 Shiley trach at bedside    LVAD speed increased to 9200   Bronch   TC since . Patient transferred to SDU.    INR today 2.64.  H&H 7.3/24 this AM.  Will repeat CBC at noon, and will send stool guaiac Patient with persistent abdominal tenderness, rate of tube feeds decreased.  No nausea/vomiting.     INR today 2.4. H&H 9.1/28.6 low flow overnight /N&V, refusing Tube feeds on D5 1/2 normal  @50 cc/hr   INR 2.69  H&H 7.7/.1 refusing Tube feeds on D5 1/2 normal  @50 cc/hr. This am + BM Melena Dr Oneill HF  aware- PRBC x1  GI team consulted -  NPO  plan on study in am-  D/w Dr Cadet Patient  to return to CTU for further management; 1PRBCS    Post op INR 2.2 today.  No bleeding. BC + for SM.  Pt is hypotensive requiring pressor and inotropic support.  ID follow up today on Cefepime will follow.   R PTC for PTX    CT C/A/P: sub q emphysema in R chest wall, GGO RUL, small ascites CTH negative; Abd US: GB thickening, pericholecystic fluid     Perchole drain in place continues to drain total output overnight 133.  Fever today 38.8    duplex LE negative    Patient with persistent abdominal pain, refusing tube feeds and medications, Psych consulted   CTA A/P ordered to r/o mesenteric ischemia 2/2 persistent anorexia, nausea, vomiting. Revealed:  Evaluation of the mesenteric vessels is limited by streak artifact from LVAD. There appears to be severe stenosis of the proximal SMA; abdominal mesenteric doppler is recommended for further evaluation. 2.  No small bowel findings to suggest acute mesenteric ischemia. 3.  Focal dissections involving the right and left common iliac arteries.  8/15: Cultures resulted BC 1/2 +GPC in clusters, SC enterobacter; mesenteric duplex: borderline stenosis of proximal SMA  : CT C/A/P noncon: Nondular opacities in R lung apex w/cavitation, abd nl  :  Continue current care, treatment of thyrotoxicosis with medications as per endocrine, d/c ABX as per team.    RUQ sono: Contracted gallbladder with cholecystostomy tube in place.  9/10 failed SMA stent, L fem PSA, s/p 3U PRCB   CTA Abd/Pelvis L RP hematoma    Trach decannulated    intubated fro resp distress for increased WOB, LL pneumonia; CT C/A/P: progressive ISABELLE opacities and L consolidations, multifocal pneumonia    TTE (EF 25%, decreased RV, mod MR)   10/3 VT -> Lidocaine gtt   10/4 Trach size 6 DCT cuff    Today:  On meropenem for BCx  + Serratia marcescens, BCx 10/1 +Enterobacter cloacae complex, now repeat BCx on 10/3 NGTD, will f/u antibiotic regimen with ID. S/p trach yesterday, plan for CPAP vent weaning as tolerated. H/H 8..5, plan to transfuse 2U of pRBCs. Continue PT as tolerated.     REVIEW OF SYSTEMS:  Unable to obtain, pt intubated and sedated     ICU Vital Signs Last 24 Hrs  T(C): 36.8 (05 Oct 2021 04:00), Max: 37 (04 Oct 2021 12:00)  T(F): 98.2 (05 Oct 2021 04:00), Max: 98.6 (04 Oct 2021 12:00)  HR: 67 (05 Oct 2021 07:00) (59 - 119)  BP: --  BP(mean): --  ABP: 92/69 (05 Oct 2021 07:00) (61/56 - 98/73)  ABP(mean): 78 (05 Oct 2021 07:00) (58 - 85)  RR: 17 (05 Oct 2021 07:00) (13 - 32)  SpO2: 100% (05 Oct 2021 07:00) (100% - 100%)      Physical Exam:  Gen:  intubated, NAD  CNS: moves all extremities to command on low dose sedation   Neck: no JVD, +trach   RES : coarse breath sounds, no wheezing    CVS: +LVAD hum   Abd: Soft. Sybil drain with bilious fluid. Positive BS throughout. Mild RUQ abdominal tenderness by perc sybil.  : Landrum intact draining urine without hematuria.  Skin: No rash, erythema, cyanosis.  Vasc: Warm and well-perfused.  Ext:  no edema    ============================I/O===========================   I&O's Detail    04 Oct 2021 07:01  -  05 Oct 2021 07:00  --------------------------------------------------------  IN:    Albumin 5%  - 250 mL: 500 mL    Dexmedetomidine: 297.3 mL    Enteral Tube Flush: 120 mL    IV PiggyBack: 200 mL    IV PiggyBack: 666.5 mL    Miscellaneous Tube Feedin mL    sodium chloride 0.9%: 230 mL    Vasopressin: 2 mL  Total IN: 2415.8 mL    OUT:    Drain (mL): 285 mL    Indwelling Catheter - Urethral (mL): 1095 mL    Lidocaine: 0 mL  Total OUT: 1380 mL    Total NET: 1035.8 mL        ============================ LABS =========================                        8.1    14.03 )-----------( 103      ( 05 Oct 2021 00:23 )             26.5     10-05    135  |  104  |  14  ----------------------------<  105<H>  3.8   |  20<L>  |  0.48<L>    Ca    9.3      05 Oct 2021 00:23  Phos  2.3     10-  Mg     1.9     10-    TPro  7.2  /  Alb  4.0  /  TBili  1.3<H>  /  DBili  x   /  AST  16  /  ALT  18  /  AlkPhos  157<H>  10    LIVER FUNCTIONS - ( 05 Oct 2021 00:23 )  Alb: 4.0 g/dL / Pro: 7.2 g/dL / ALK PHOS: 157 U/L / ALT: 18 U/L / AST: 16 U/L / GGT: x           PT/INR - ( 04 Oct 2021 00:31 )   PT: 13.2 sec;   INR: 1.11 ratio         PTT - ( 04 Oct 2021 00:31 )  PTT:29.8 sec  ABG - ( 04 Oct 2021 23:56 )  pH, Arterial: 7.43  pH, Blood: x     /  pCO2: 33    /  pO2: 194   / HCO3: 22    / Base Excess: -2.0  /  SaO2: 100.0               ======================Micro/Rad/Cardio=================  Culture: Reviewed   CXR: Reviewed  Echo:Reviewed  ======================================================  PAST MEDICAL & SURGICAL HISTORY:  CHF (congestive heart failure)    CAD (coronary artery disease)    Depression    Pleural effusion    History of 2019 novel coronavirus disease (COVID-19)  2020    Hemorrhoids    Bleeding hemorrhoids    Peripheral arterial disease    Claudication    BPH with urinary obstruction    ACC/AHA stage D systolic heart failure    Anticoagulation goal of INR 2.0 to 2.5    Falls    Clavicle fracture    CKD (chronic kidney disease), stage III    Iron deficiency anemia    H/O epistaxis    Vertigo    GI bleed    S/P TVR (tricuspid valve repair)    S/P ventricular assist device    S/P endoscopy      ====================ASSESSMENT ==============  Stage D Nonischemic Cardiomyopathy, Status Post HM2 on 2017    Cardiogenic shock  Hemodynamic instability   Acute hypoxemic respiratory failure s/p trach , decannulated on ; Reintubated on    GI bleed , Status Post Enteroscopy   Anemia, in setting of melena   Chronic Kidney Disease  Stress hyperglycemia   C.diff positive on    Hypovolemic shock  Septic shock  Leukocytosis  GB thickening/percholecystic s/p perc choley by IT   SMA stenosis  Serratia/citrobacter pneumonia   Stenotrophomonas pneumonia   Enterobacter pneumonia   Nasuea/vomiting  Deconditioning    Plan:  ====================== NEUROLOGY=====================  Sedated with IV Precedex to maintain vent synchrony   Tylenol PRN for analgesia   PT as tolerated     acetaminophen    Suspension .. 650 milliGRAM(s) Enteral Tube every 6 hours PRN Mild Pain (1 - 3)  dexMEDEtomidine Infusion 1.5 MICROgram(s)/kG/Hr (23.1 mL/Hr) IV Continuous <Continuous>    ==================== RESPIRATORY======================  Acute hypoxemic respiratory failure s/p #8 shiley trach on ; decannulated on ; Reintubated on ; trach size 6 DCT cuff on 10/4   Continue close monitoring of respiratory rate and breathing pattern, following of ABG’s with A-line monitoring, continuous pulse oximetry monitoring.   CPAP vent weaning as tolerated     Mechanical Ventilation:  Mode: AC/ CMV (Assist Control/ Continuous Mandatory Ventilation)  RR (machine): 16  TV (machine): 500  FiO2: 40  PEEP: 5  ITime: 1  MAP: 10  PIP: 21      ====================CARDIOVASCULAR==================  Stage D Nonischemic Cardiomyopathy, Status Post HM2 on 2017; LVAD settings 9200, flow 5.6   TTE 10/4:  Severely decreased LV systolic function, Diffuse hypokinesis. Mild AR. No pericardial effusion   VT on 10/4, s/p Lidocaine drip, continue close tele monitoring   Pressor support with IV Vasopressin   Holding off on Propranolol for now     vasopressin Infusion 0.017 Unit(s)/Min (1 mL/Hr) IV Continuous <Continuous>    ===================HEMATOLOGIC/ONC ===================  CTA A/P on  +L RP hematoma   Acute blood loss anemia, H/H 8.1/26.5, plan to transfuse 2U of pRBCs and monitor CBC   Thrombocytopenia, possible in setting of sepsis, now starting to trend up, 73->103, Rest of labs not consistent with DIC  Holding coumadin and ASA given recurrent GI bleeding, continue monitoring LDH     ===================== RENAL =========================  Hx of CKD, continue monitoring urine output, I&OS, BUN/Cr   Euvolemic, even fluid balance, currently off diuretics.    Replete lytes PRN. Keep K> 4 and Mg >2    ==================== GASTROINTESTINAL===================  Tolerating TF Vital AF 1.5, @ goal 40 cc/hr   CTA A/P : Evaluation of the mesenteric vessels is limited by streak artifact from LVAD. There appears to be severe stenosis of the proximal SMA; abdominal mesenteric doppler is recommended for further evaluation. 2.  No small bowel findings to suggest acute mesenteric ischemia. 3.  Focal dissections involving the right and left common iliac arteries.  Mesenteric duplex on 8/15 borderline stenosis of proximal SMA, s/p failed SMA stent, L fem RP hematoma on 9/10; No intervention for cholecystostomy tube/PEJ at this time   Reglan for gut motility  PRN Simethicone for gas    multivitamin 1 Tablet(s) Oral daily  GI prophylaxis, pantoprazole  Injectable 40 milliGRAM(s) IV Push every 12 hours  simethicone 80 milliGRAM(s) Chew every 8 hours PRN Gas  sodium chloride 0.9%. 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       Per endocrine      - c/w Methimazole, adjust based on labs        - Check Free T4 and total T3     insulin lispro (ADMELOG) corrective regimen sliding scale   SubCutaneous every 6 hours  methimazole 20 milliGRAM(s) Oral <User Schedule>    ========================INFECTIOUS DISEASE================  Afebrile, WBC downtrending 20.51->14.03   Continue trending WBC and monitoring fever curve   CT C/A/P : progressive ISABELLE opacities and L consolidations, multifocal pneumonia  BCx  + Serratia marcescens, BCx 10/1 +Enterobacter cloacae complex, repeat BCx on 10/3 NGTD, c/w Meropenem   Vancomycin solution for C.diff prophylaxis  Will f/u antibiotic regimen with ID    meropenem  IVPB 1000 milliGRAM(s) IV Intermittent every 8 hours  vancomycin    Solution 125 milliGRAM(s) Oral every 12 hours          Patient requires continuous monitoring with bedside rhythm monitoring, pulse ox monitoring, and intermittent blood gas analysis. Care plan discussed with ICU care team. Patient remained critical and at risk for life threatening decompensation.     By signing my name below, I, Agnieszka Cherry, attest that this documentation has been prepared under the direction and in the presence of CLAUDIA Lee   Electronically signed: Romel Shaffer, 10-05-21 @ 08:00    I, Georgina Perez, personally performed the services described in this documentation. all medical record entries made by the romel were at my direction and in my presence. I have reviewed the chart and agree that the record reflects my personal performance and is accurate and complete  Electronically signed: CLAUDIA Lee      RICKY HAMPTON  MRN-[id number]  Patient is a 66y old  Male who presents with a chief complaint of Anemia, Supratherapeutic INR, Dark Stools (04 Oct 2021 21:30)    HPI:  65M PMH ACC/AHA stage D HF due to NICM HM2 LVAD , TV annuloplasty ring 17 as destination therapy due to severe peripheral artery disease with significant stenosis  SIADH, Depression, CKD-3 with hyperkalemia, past E. coli UTIs, driveline drainage (21) and COVID-19 (back in 2020)  He was recently seen in clinic where he complained of abdominal pain and dark stools w constipation back in May. He presents to [facility] ER today weakness and fatigue, moderate and + Black stools for three days, on coumadin secondary to warfarin use in the setting of an LVAD. Patient has required transfusions for GIB in the past. Mostly recently back in 2021 pt had anemia with dark stools. No interventions was done at that time. However Last Endoscopy was done in 2020 (negative). Today labs show patient is anemic with H/H of 4.5/16.3,. INR is 8.84 MAP in the 90s, Temp 35.1. He denies any chest pain, shortness of breath, dizziness, abd pain, nausea or vomiting.       (2021 16:57)      Surgery/Hospital Course:   admit for melena w/ anemia, INR 8.84   6/15 Capsul study (+) for small bowel bleed, balloon endoscopy (old blood in prox ileum); post EGD - septic w/ L opacity, re-intubated for concern for aspiration, TTE (Mod MR, decrease biV w/ interventricular septum boweing towards R)   bronch    +C Diff    CT C/A/P: Fluid filled colon which may be 2/2 rapid transit. Small bilateral pleffs with associates. Compressive atelectasis New ISABELLE & LLL  parenchymal opacities, suspicious for pneumonia. Moderate stenosis in the proximal superior mesenteric artery.    #8 Shiley trach at bedside    LVAD speed increased to 9200   Bronch   TC since . Patient transferred to SDU.    INR today 2.64.  H&H 7.3/24 this AM.  Will repeat CBC at noon, and will send stool guaiac Patient with persistent abdominal tenderness, rate of tube feeds decreased.  No nausea/vomiting.     INR today 2.4. H&H 9.1/28.6 low flow overnight /N&V, refusing Tube feeds on D5 1/2 normal  @50 cc/hr   INR 2.69  H&H 7.7/.1 refusing Tube feeds on D5 1/2 normal  @50 cc/hr. This am + BM Melena Dr Oneill HF  aware- PRBC x1  GI team consulted -  NPO  plan on study in am-  D/w Dr Cadet Patient  to return to CTU for further management; 1PRBCS    Post op INR 2.2 today.  No bleeding. BC + for SM.  Pt is hypotensive requiring pressor and inotropic support.  ID follow up today on Cefepime will follow.   R PTC for PTX    CT C/A/P: sub q emphysema in R chest wall, GGO RUL, small ascites CTH negative; Abd US: GB thickening, pericholecystic fluid     Perchole drain in place continues to drain total output overnight 133.  Fever today 38.8    duplex LE negative    Patient with persistent abdominal pain, refusing tube feeds and medications, Psych consulted   CTA A/P ordered to r/o mesenteric ischemia 2/2 persistent anorexia, nausea, vomiting. Revealed:  Evaluation of the mesenteric vessels is limited by streak artifact from LVAD. There appears to be severe stenosis of the proximal SMA; abdominal mesenteric doppler is recommended for further evaluation. 2.  No small bowel findings to suggest acute mesenteric ischemia. 3.  Focal dissections involving the right and left common iliac arteries.  8/15: Cultures resulted BC 1/2 +GPC in clusters, SC enterobacter; mesenteric duplex: borderline stenosis of proximal SMA  : CT C/A/P noncon: Nondular opacities in R lung apex w/cavitation, abd nl  :  Continue current care, treatment of thyrotoxicosis with medications as per endocrine, d/c ABX as per team.    RUQ sono: Contracted gallbladder with cholecystostomy tube in place.  9/10 failed SMA stent, L fem PSA, s/p 3U PRCB   CTA Abd/Pelvis L RP hematoma    Trach decannulated    intubated fro resp distress for increased WOB, LL pneumonia; CT C/A/P: progressive ISABELLE opacities and L consolidations, multifocal pneumonia    TTE (EF 25%, decreased RV, mod MR)   10/3 VT -> Lidocaine gtt   10/4 Trach size 6 DCT cuff    Today:  On meropenem for BCx  + Serratia marcescens, BCx 10/1 +Enterobacter cloacae complex, now repeat BCx on 10/3 NGTD, will f/u antibiotic regimen with ID. CT Abd/Pel w/ contrast today to assess for acute process. S/p trach yesterday, plan for CPAP vent weaning as tolerated. H/H ..5, plan to transfuse 2U of pRBCs. Continue PT as tolerated.     REVIEW OF SYSTEMS:  Unable to obtain, pt intubated and sedated     ICU Vital Signs Last 24 Hrs  T(C): 36.8 (05 Oct 2021 04:00), Max: 37 (04 Oct 2021 12:00)  T(F): 98.2 (05 Oct 2021 04:00), Max: 98.6 (04 Oct 2021 12:00)  HR: 67 (05 Oct 2021 07:00) (59 - 119)  BP: --  BP(mean): --  ABP: 92/69 (05 Oct 2021 07:00) (61/56 - 98/73)  ABP(mean): 78 (05 Oct 2021 07:00) (58 - 85)  RR: 17 (05 Oct 2021 07:00) (13 - 32)  SpO2: 100% (05 Oct 2021 07:00) (100% - 100%)      Physical Exam:  Gen:  intubated, NAD  CNS: moves all extremities to command on low dose sedation   Neck: no JVD, +trach   RES : coarse breath sounds, no wheezing    CVS: +LVAD hum   Abd: Soft. Sybil drain with bilious fluid. Positive BS throughout. Mild RUQ abdominal tenderness by perc sybil.  : Landrum intact draining urine without hematuria.  Skin: No rash, erythema, cyanosis.  Vasc: Warm and well-perfused.  Ext:  no edema    ============================I/O===========================   I&O's Detail    04 Oct 2021 07:01  -  05 Oct 2021 07:00  --------------------------------------------------------  IN:    Albumin 5%  - 250 mL: 500 mL    Dexmedetomidine: 297.3 mL    Enteral Tube Flush: 120 mL    IV PiggyBack: 200 mL    IV PiggyBack: 666.5 mL    Miscellaneous Tube Feedin mL    sodium chloride 0.9%: 230 mL    Vasopressin: 2 mL  Total IN: 2415.8 mL    OUT:    Drain (mL): 285 mL    Indwelling Catheter - Urethral (mL): 1095 mL    Lidocaine: 0 mL  Total OUT: 1380 mL    Total NET: 1035.8 mL        ============================ LABS =========================                        8.1    14.03 )-----------( 103      ( 05 Oct 2021 00:23 )             26.5     10-05    135  |  104  |  14  ----------------------------<  105<H>  3.8   |  20<L>  |  0.48<L>    Ca    9.3      05 Oct 2021 00:23  Phos  2.3     10-  Mg     1.9     10-    TPro  7.2  /  Alb  4.0  /  TBili  1.3<H>  /  DBili  x   /  AST  16  /  ALT  18  /  AlkPhos  157<H>  10-    LIVER FUNCTIONS - ( 05 Oct 2021 00:23 )  Alb: 4.0 g/dL / Pro: 7.2 g/dL / ALK PHOS: 157 U/L / ALT: 18 U/L / AST: 16 U/L / GGT: x           PT/INR - ( 04 Oct 2021 00:31 )   PT: 13.2 sec;   INR: 1.11 ratio         PTT - ( 04 Oct 2021 00:31 )  PTT:29.8 sec  ABG - ( 04 Oct 2021 23:56 )  pH, Arterial: 7.43  pH, Blood: x     /  pCO2: 33    /  pO2: 194   / HCO3: 22    / Base Excess: -2.0  /  SaO2: 100.0               ======================Micro/Rad/Cardio=================  Culture: Reviewed   CXR: Reviewed  Echo:Reviewed  ======================================================  PAST MEDICAL & SURGICAL HISTORY:  CHF (congestive heart failure)    CAD (coronary artery disease)    Depression    Pleural effusion    History of  novel coronavirus disease (COVID-19)  2020    Hemorrhoids    Bleeding hemorrhoids    Peripheral arterial disease    Claudication    BPH with urinary obstruction    ACC/AHA stage D systolic heart failure    Anticoagulation goal of INR 2.0 to 2.5    Falls    Clavicle fracture    CKD (chronic kidney disease), stage III    Iron deficiency anemia    H/O epistaxis    Vertigo    GI bleed    S/P TVR (tricuspid valve repair)    S/P ventricular assist device    S/P endoscopy      ====================ASSESSMENT ==============  Stage D Nonischemic Cardiomyopathy, Status Post HM2 on 2017    Cardiogenic shock  Hemodynamic instability   Acute hypoxemic respiratory failure s/p trach , decannulated on ; Reintubated on    GI bleed , Status Post Enteroscopy   Anemia, in setting of melena   Chronic Kidney Disease  Stress hyperglycemia   C.diff positive on    Hypovolemic shock  Septic shock  Leukocytosis  GB thickening/percholecystic s/p perc choley by IT   SMA stenosis  Serratia/citrobacter pneumonia   Stenotrophomonas pneumonia   Enterobacter pneumonia   Nasuea/vomiting  Deconditioning    Plan:  ====================== NEUROLOGY=====================  Sedated with IV Precedex to maintain vent synchrony   Tylenol PRN for analgesia   PT as tolerated     acetaminophen    Suspension .. 650 milliGRAM(s) Enteral Tube every 6 hours PRN Mild Pain (1 - 3)  dexMEDEtomidine Infusion 1.5 MICROgram(s)/kG/Hr (23.1 mL/Hr) IV Continuous <Continuous>    ==================== RESPIRATORY======================  Acute hypoxemic respiratory failure s/p #8 shiley trach on ; decannulated on ; Reintubated on ; trach size 6 DCT cuff on 10/4   Continue close monitoring of respiratory rate and breathing pattern, following of ABG’s with A-line monitoring, continuous pulse oximetry monitoring.   CPAP vent weaning as tolerated     Mechanical Ventilation:  Mode: AC/ CMV (Assist Control/ Continuous Mandatory Ventilation)  RR (machine): 16  TV (machine): 500  FiO2: 40  PEEP: 5  ITime: 1  MAP: 10  PIP: 21      ====================CARDIOVASCULAR==================  Stage D Nonischemic Cardiomyopathy, Status Post HM2 on 2017; LVAD settings 9200, flow 5.6   TTE 10/4:  Severely decreased LV systolic function, Diffuse hypokinesis. Mild AR. No pericardial effusion   VT on 10/4, s/p Lidocaine drip, continue close tele monitoring   Pressor support with IV Vasopressin   Holding off on Propranolol for now     vasopressin Infusion 0.017 Unit(s)/Min (1 mL/Hr) IV Continuous <Continuous>    ===================HEMATOLOGIC/ONC ===================  CTA A/P on  +L RP hematoma   Acute blood loss anemia, H/H 8.1/26.5, plan to transfuse 2U of pRBCs and monitor CBC   Thrombocytopenia, possible in setting of sepsis, now starting to trend up, 73->103, Rest of labs not consistent with DIC  Holding coumadin and ASA given recurrent GI bleeding, continue monitoring LDH     ===================== RENAL =========================  Hx of CKD, continue monitoring urine output, I&OS, BUN/Cr   Euvolemic, even fluid balance, currently off diuretics.    Replete lytes PRN. Keep K> 4 and Mg >2    ==================== GASTROINTESTINAL===================  Tolerating TF Vital AF 1.5, @ goal 40 cc/hr   CTA A/P : Evaluation of the mesenteric vessels is limited by streak artifact from LVAD. There appears to be severe stenosis of the proximal SMA; abdominal mesenteric doppler is recommended for further evaluation. 2.  No small bowel findings to suggest acute mesenteric ischemia. 3.  Focal dissections involving the right and left common iliac arteries.  Mesenteric duplex on 8/15 borderline stenosis of proximal SMA, s/p failed SMA stent, L fem RP hematoma on 9/10; No intervention for cholecystostomy tube/PEJ at this time   CT Abd/Pel w/ contrast today to assess for acute process   Reglan for gut motility  PRN Simethicone for gas    multivitamin 1 Tablet(s) Oral daily  GI prophylaxis, pantoprazole  Injectable 40 milliGRAM(s) IV Push every 12 hours  simethicone 80 milliGRAM(s) Chew every 8 hours PRN Gas  sodium chloride 0.9%. 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       Per endocrine      - c/w Methimazole, adjust based on labs        - Check Free T4 and total T3     insulin lispro (ADMELOG) corrective regimen sliding scale   SubCutaneous every 6 hours  methimazole 20 milliGRAM(s) Oral <User Schedule>    ========================INFECTIOUS DISEASE================  Afebrile, WBC downtrending 20.51->14.03   Continue trending WBC and monitoring fever curve   CT C/A/P : progressive ISABELLE opacities and L consolidations, multifocal pneumonia  BCx  + Serratia marcescens, BCx 10/1 +Enterobacter cloacae complex, repeat BCx on 10/3 NGTD, c/w Meropenem   Vancomycin solution for C.diff prophylaxis  Will f/u antibiotic regimen with ID    meropenem  IVPB 1000 milliGRAM(s) IV Intermittent every 8 hours  vancomycin    Solution 125 milliGRAM(s) Oral every 12 hours          Patient requires continuous monitoring with bedside rhythm monitoring, pulse ox monitoring, and intermittent blood gas analysis. Care plan discussed with ICU care team. Patient remained critical and at risk for life threatening decompensation.     By signing my name below, IAgnieszka, attest that this documentation has been prepared under the direction and in the presence of CLAUDIA Lee   Electronically signed: Cesia Shaffer, 10-05-21 @ 08:00    IGeorgina, personally performed the services described in this documentation. all medical record entries made by the luisibmel were at my direction and in my presence. I have reviewed the chart and agree that the record reflects my personal performance and is accurate and complete  Electronically signed: CLAUDIA Lee      RICKY HAMPTON  MRN-[id number]  Patient is a 66y old  Male who presents with a chief complaint of Anemia, Supratherapeutic INR, Dark Stools (04 Oct 2021 21:30)    HPI:  65M PMH ACC/AHA stage D HF due to NICM HM2 LVAD , TV annuloplasty ring 17 as destination therapy due to severe peripheral artery disease with significant stenosis  SIADH, Depression, CKD-3 with hyperkalemia, past E. coli UTIs, driveline drainage (21) and COVID-19 (back in 2020)  He was recently seen in clinic where he complained of abdominal pain and dark stools w constipation back in May. He presents to [facility] ER today weakness and fatigue, moderate and + Black stools for three days, on coumadin secondary to warfarin use in the setting of an LVAD. Patient has required transfusions for GIB in the past. Mostly recently back in 2021 pt had anemia with dark stools. No interventions was done at that time. However Last Endoscopy was done in 2020 (negative). Today labs show patient is anemic with H/H of 4.5/16.3,. INR is 8.84 MAP in the 90s, Temp 35.1. He denies any chest pain, shortness of breath, dizziness, abd pain, nausea or vomiting.       (2021 16:57)      Surgery/Hospital Course:   admit for melena w/ anemia, INR 8.84   6/15 Capsul study (+) for small bowel bleed, balloon endoscopy (old blood in prox ileum); post EGD - septic w/ L opacity, re-intubated for concern for aspiration, TTE (Mod MR, decrease biV w/ interventricular septum boweing towards R)   bronch    +C Diff    CT C/A/P: Fluid filled colon which may be 2/2 rapid transit. Small bilateral pleffs with associates. Compressive atelectasis New ISABELLE & LLL  parenchymal opacities, suspicious for pneumonia. Moderate stenosis in the proximal superior mesenteric artery.    #8 Shiley trach at bedside    LVAD speed increased to 9200   Bronch   TC since . Patient transferred to SDU.    INR today 2.64.  H&H 7.3/24 this AM.  Will repeat CBC at noon, and will send stool guaiac Patient with persistent abdominal tenderness, rate of tube feeds decreased.  No nausea/vomiting.     INR today 2.4. H&H 9.1/28.6 low flow overnight /N&V, refusing Tube feeds on D5 1/2 normal  @50 cc/hr   INR 2.69  H&H 7.7/.1 refusing Tube feeds on D5 1/2 normal  @50 cc/hr. This am + BM Melena Dr Oneill HF  aware- PRBC x1  GI team consulted -  NPO  plan on study in am-  D/w Dr Cadet Patient  to return to CTU for further management; 1PRBCS    Post op INR 2.2 today.  No bleeding. BC + for SM.  Pt is hypotensive requiring pressor and inotropic support.  ID follow up today on Cefepime will follow.   R PTC for PTX    CT C/A/P: sub q emphysema in R chest wall, GGO RUL, small ascites CTH negative; Abd US: GB thickening, pericholecystic fluid     Perchole drain in place continues to drain total output overnight 133.  Fever today 38.8    duplex LE negative    Patient with persistent abdominal pain, refusing tube feeds and medications, Psych consulted   CTA A/P ordered to r/o mesenteric ischemia 2/2 persistent anorexia, nausea, vomiting. Revealed:  Evaluation of the mesenteric vessels is limited by streak artifact from LVAD. There appears to be severe stenosis of the proximal SMA; abdominal mesenteric doppler is recommended for further evaluation. 2.  No small bowel findings to suggest acute mesenteric ischemia. 3.  Focal dissections involving the right and left common iliac arteries.  8/15: Cultures resulted BC 1/2 +GPC in clusters, SC enterobacter; mesenteric duplex: borderline stenosis of proximal SMA  : CT C/A/P noncon: Nondular opacities in R lung apex w/cavitation, abd nl  :  Continue current care, treatment of thyrotoxicosis with medications as per endocrine, d/c ABX as per team.    RUQ sono: Contracted gallbladder with cholecystostomy tube in place.  9/10 failed SMA stent, L fem PSA, s/p 3U PRCB   CTA Abd/Pelvis L RP hematoma    Trach decannulated    intubated fro resp distress for increased WOB, LL pneumonia; CT C/A/P: progressive ISABELLE opacities and L consolidations, multifocal pneumonia    TTE (EF 25%, decreased RV, mod MR)   10/3 VT -> Lidocaine gtt   10/4 Trach size 6 DCT cuff    Today:  On meropenem for BCx  + Serratia marcescens, BCx 10/1 +Enterobacter cloacae complex, now repeat BCx on 10/3 NGTD, will f/u antibiotic regimen with ID. CT Abd/Pel w/ contrast today to assess for acute process. S/p trach yesterday, plan for CPAP vent weaning as tolerated. H/H ..5, plan to transfuse 2U of pRBCs. Continue PT as tolerated.     REVIEW OF SYSTEMS:  Unable to obtain, pt intubated and sedated     ICU Vital Signs Last 24 Hrs  T(C): 36.8 (05 Oct 2021 04:00), Max: 37 (04 Oct 2021 12:00)  T(F): 98.2 (05 Oct 2021 04:00), Max: 98.6 (04 Oct 2021 12:00)  HR: 67 (05 Oct 2021 07:00) (59 - 119)  BP: --  BP(mean): --  ABP: 92/69 (05 Oct 2021 07:00) (61/56 - 98/73)  ABP(mean): 78 (05 Oct 2021 07:00) (58 - 85)  RR: 17 (05 Oct 2021 07:00) (13 - 32)  SpO2: 100% (05 Oct 2021 07:00) (100% - 100%)      Physical Exam:  Gen:  intubated, NAD  CNS: moves all extremities to command on low dose sedation   Neck: no JVD, +trach   RES : coarse breath sounds, no wheezing    CVS: +LVAD hum   Abd: Soft. Sybil drain with bilious fluid. Positive BS throughout. Mild RUQ abdominal tenderness by perc sybil.  : Landrum intact draining urine without hematuria.  Skin: No rash, erythema, cyanosis.  Vasc: Warm and well-perfused.  Ext:  no edema    ============================I/O===========================   I&O's Detail    04 Oct 2021 07:01  -  05 Oct 2021 07:00  --------------------------------------------------------  IN:    Albumin 5%  - 250 mL: 500 mL    Dexmedetomidine: 297.3 mL    Enteral Tube Flush: 120 mL    IV PiggyBack: 200 mL    IV PiggyBack: 666.5 mL    Miscellaneous Tube Feedin mL    sodium chloride 0.9%: 230 mL    Vasopressin: 2 mL  Total IN: 2415.8 mL    OUT:    Drain (mL): 285 mL    Indwelling Catheter - Urethral (mL): 1095 mL    Lidocaine: 0 mL  Total OUT: 1380 mL    Total NET: 1035.8 mL        ============================ LABS =========================                        8.1    14.03 )-----------( 103      ( 05 Oct 2021 00:23 )             26.5     10-05    135  |  104  |  14  ----------------------------<  105<H>  3.8   |  20<L>  |  0.48<L>    Ca    9.3      05 Oct 2021 00:23  Phos  2.3     10-  Mg     1.9     10-    TPro  7.2  /  Alb  4.0  /  TBili  1.3<H>  /  DBili  x   /  AST  16  /  ALT  18  /  AlkPhos  157<H>  10-    LIVER FUNCTIONS - ( 05 Oct 2021 00:23 )  Alb: 4.0 g/dL / Pro: 7.2 g/dL / ALK PHOS: 157 U/L / ALT: 18 U/L / AST: 16 U/L / GGT: x           PT/INR - ( 04 Oct 2021 00:31 )   PT: 13.2 sec;   INR: 1.11 ratio         PTT - ( 04 Oct 2021 00:31 )  PTT:29.8 sec  ABG - ( 04 Oct 2021 23:56 )  pH, Arterial: 7.43  pH, Blood: x     /  pCO2: 33    /  pO2: 194   / HCO3: 22    / Base Excess: -2.0  /  SaO2: 100.0               ======================Micro/Rad/Cardio=================  Culture: Reviewed   CXR: Reviewed  Echo:Reviewed  ======================================================  PAST MEDICAL & SURGICAL HISTORY:  CHF (congestive heart failure)    CAD (coronary artery disease)    Depression    Pleural effusion    History of  novel coronavirus disease (COVID-19)  2020    Hemorrhoids    Bleeding hemorrhoids    Peripheral arterial disease    Claudication    BPH with urinary obstruction    ACC/AHA stage D systolic heart failure    Anticoagulation goal of INR 2.0 to 2.5    Falls    Clavicle fracture    CKD (chronic kidney disease), stage III    Iron deficiency anemia    H/O epistaxis    Vertigo    GI bleed    S/P TVR (tricuspid valve repair)    S/P ventricular assist device    S/P endoscopy      ====================ASSESSMENT ==============  Stage D Nonischemic Cardiomyopathy, Status Post HM2 on 2017    Cardiogenic shock  Hemodynamic instability   Acute hypoxemic respiratory failure s/p trach , decannulated on ; Reintubated on    GI bleed , Status Post Enteroscopy   Anemia, in setting of melena   Chronic Kidney Disease  Stress hyperglycemia   C.diff positive on    Hypovolemic shock  Septic shock  Leukocytosis  GB thickening/percholecystic s/p perc choley by IT   SMA stenosis  Serratia/citrobacter pneumonia   Stenotrophomonas pneumonia   Enterobacter pneumonia   Nasuea/vomiting  Deconditioning    Plan:  ====================== NEUROLOGY=====================  Sedated with IV Precedex to maintain vent synchrony   Tylenol PRN for analgesia   PT as tolerated     acetaminophen    Suspension .. 650 milliGRAM(s) Enteral Tube every 6 hours PRN Mild Pain (1 - 3)  dexMEDEtomidine Infusion 1.5 MICROgram(s)/kG/Hr (23.1 mL/Hr) IV Continuous <Continuous>    ==================== RESPIRATORY======================  Acute hypoxemic respiratory failure s/p #8 shiley trach on ; decannulated on ; Reintubated on ; trach size 6 DCT cuff on 10/4   Continue close monitoring of respiratory rate and breathing pattern, following of ABG’s with A-line monitoring, continuous pulse oximetry monitoring.   CPAP vent weaning as tolerated     Mechanical Ventilation:  Mode: AC/ CMV (Assist Control/ Continuous Mandatory Ventilation)  RR (machine): 16  TV (machine): 500  FiO2: 40  PEEP: 5  ITime: 1  MAP: 10  PIP: 21      ====================CARDIOVASCULAR==================  Stage D Nonischemic Cardiomyopathy, Status Post HM2 on 2017; LVAD settings 9200, flow 5.6   TTE 10/4:  Severely decreased LV systolic function, Diffuse hypokinesis. Mild AR. No pericardial effusion   VT on 10/4, s/p Lidocaine drip, continue close tele monitoring   Pressor support with IV Vasopressin   Holding off on Propranolol for now     vasopressin Infusion 0.017 Unit(s)/Min (1 mL/Hr) IV Continuous <Continuous>    ===================HEMATOLOGIC/ONC ===================  CTA A/P on  +L RP hematoma   Acute blood loss anemia, H/H 8.1/26.5, plan to transfuse 2U of pRBCs and monitor CBC   Thrombocytopenia, possible in setting of sepsis, now starting to trend up, 73->103, Rest of labs not consistent with DIC  Holding coumadin and ASA given recurrent GI bleeding, continue monitoring LDH     ===================== RENAL =========================  Hx of CKD, continue monitoring urine output, I&OS, BUN/Cr   Euvolemic, even fluid balance, currently off diuretics.    Replete lytes PRN. Keep K> 4 and Mg >2    ==================== GASTROINTESTINAL===================  Tolerating TF Vital AF 1.5, @ goal 40 cc/hr   CTA A/P : Evaluation of the mesenteric vessels is limited by streak artifact from LVAD. There appears to be severe stenosis of the proximal SMA; abdominal mesenteric doppler is recommended for further evaluation. 2.  No small bowel findings to suggest acute mesenteric ischemia. 3.  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  Mesenteric duplex on 8/15 borderline stenosis of proximal SMA, s/p failed SMA stent, L fem RP hematoma on 9/10; No intervention for cholecystostomy tube/PEJ at this time   CT Abd/Pel w/ contrast today to assess for acute process   Reglan for gut motility  PRN Simethicone for gas    multivitamin 1 Tablet(s) Oral daily  GI prophylaxis, pantoprazole  Injectable 40 milliGRAM(s) IV Push every 12 hours  simethicone 80 milliGRAM(s) Chew every 8 hours PRN Gas  sodium chloride 0.9%. 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       Per endocrine      - c/w Methimazole, adjust based on labs        - Check Free T4 and total T3     insulin lispro (ADMELOG) corrective regimen sliding scale   SubCutaneous every 6 hours  methimazole 20 milliGRAM(s) Oral <User Schedule>    ========================INFECTIOUS DISEASE================  Afebrile, WBC downtrending 20.51->14.03   Continue trending WBC and monitoring fever curve   CT C/A/P : progressive ISABELLE opacities and L consolidations, multifocal pneumonia  BCx  + Serratia marcescens, BCx 10/1 +Enterobacter cloacae complex, repeat BCx on 10/3 NGTD, c/w Meropenem   repeat BCx sent today, follow up  follow up ct abd on 10/5   Vancomycin solution for C.diff prophylaxis  Will f/u antibiotic regimen with ID    meropenem  IVPB 1000 milliGRAM(s) IV Intermittent every 8 hours  vancomycin    Solution 125 milliGRAM(s) Oral every 12 hours          Patient requires continuous monitoring with bedside rhythm monitoring, pulse ox monitoring, and intermittent blood gas analysis. Care plan discussed with ICU care team. Patient remained critical and at risk for life threatening decompensation.     By signing my name below, I, Agnieszka Cherry, attest that this documentation has been prepared under the direction and in the presence of CLAUDIA Lee   Electronically signed: Romel Shaffer, 10-05-21 @ 08:00    IGeorgina, personally performed the services described in this documentation. all medical record entries made by the romel were at my direction and in my presence. I have reviewed the chart and agree that the record reflects my personal performance and is accurate and complete  Electronically signed: CLAUDIA Lee

## 2021-10-06 NOTE — BH CONSULTATION LIAISON PROGRESS NOTE - NSBHCHARTREVIEWLAB_PSY_A_CORE FT
8.6    15.41 )-----------( 220      ( 19 Aug 2021 01:02 )             27.6     08-19    130<L>  |  94<L>  |  8   ----------------------------<  178<H>  3.8   |  26  |  0.50    Ca    9.6      19 Aug 2021 01:02  Phos  2.6     08-19  Mg     1.7     08-19    TPro  7.5  /  Alb  3.1<L>  /  TBili  0.5  /  DBili  x   /  AST  20  /  ALT  39  /  AlkPhos  197<H>  08-19  
                                        8.6    15.41 )-----------( 220      ( 19 Aug 2021 01:02 )             27.6     08-19    130<L>  |  94<L>  |  8   ----------------------------<  178<H>  3.8   |  26  |  0.50    Ca    9.6      19 Aug 2021 01:02  Phos  2.6     08-19  Mg     1.7     08-19    TPro  7.5  /  Alb  3.1<L>  /  TBili  0.5  /  DBili  x   /  AST  20  /  ALT  39  /  AlkPhos  197<H>  08-19

## 2021-10-06 NOTE — PROGRESS NOTE ADULT - ASSESSMENT
Assessment and Recommendation:   · Assessment	  Assessment and recommendation :  Recurrent Acute hypoxic respiratory Failure reintubated on full ventilator support FI02 is 40% S/P tracheostomy    Acute blood loss anemia S/P multiple  blood transfusion post tracheostomy   recurrent  septic shock  weaned on   vasopressin   hemorrhagic shock receiving 2 unit of blood transfusion   pan culture negative on meropenem  , po vancomycin   S/P cholecystostomy tube placement by IR    AF RVR back to regular sinus Rhythm   Non ischemic cardiomyopathy continue ACE inhibitor and B-Blockers   mesenteric ischemia failure to stent SMA , no plan for repeated trial   S/P 3 unit of blood transfusion   Stage D systolic heart failure S/P LVAD HM2   MH2 LVAD  with  TV Annuloplasty  Severe peripheral vascular disease   severe hyperglycemia on insulin coverage    Reglan 10 mg for Gastric Motility   hyperthyroidism on Methimazole 10mg TID   critical care polyneuropathy   Anemia of Acute blood Loss   severe protein caloric malnutrition   Chronic kidney disease stage III  Depressed and withdrawn   resume NG tube feeding   GI prophylaxis with PPI   Discussed with TCV team   critical care time is 35 minutes

## 2021-10-06 NOTE — PROGRESS NOTE ADULT - SUBJECTIVE AND OBJECTIVE BOX
ENT ISSUE/POD: POD #2 for trach     HPI: 65 y/o male POD #2 for tracheostomy #6 DCT cuffed in place 2/2 respiratory failure. Pt is doing well on the vent. no complaints or issues.           PAST MEDICAL & SURGICAL HISTORY:  CHF (congestive heart failure)    CAD (coronary artery disease)    Depression    Pleural effusion    History of 2019 novel coronavirus disease (COVID-19)  april 2020    Hemorrhoids    Bleeding hemorrhoids    Peripheral arterial disease    Claudication    BPH with urinary obstruction    ACC/AHA stage D systolic heart failure    Anticoagulation goal of INR 2.0 to 2.5    Falls    Clavicle fracture    CKD (chronic kidney disease), stage III    Iron deficiency anemia    H/O epistaxis    Vertigo    GI bleed    S/P TVR (tricuspid valve repair)    S/P ventricular assist device    S/P endoscopy      Allergies    Amiodarone Hydrochloride (Other (Severe))    Intolerances      MEDICATIONS  (STANDING):  chlorhexidine 0.12% Liquid 15 milliLiter(s) Oral Mucosa two times a day  chlorhexidine 2% Cloths 1 Application(s) Topical <User Schedule>  dexMEDEtomidine Infusion 1.5 MICROgram(s)/kG/Hr (23.1 mL/Hr) IV Continuous <Continuous>  insulin lispro (ADMELOG) corrective regimen sliding scale   SubCutaneous every 6 hours  meropenem  IVPB 1000 milliGRAM(s) IV Intermittent every 8 hours  methimazole 20 milliGRAM(s) Oral <User Schedule>  metoclopramide Injectable 10 milliGRAM(s) IV Push every 8 hours  multivitamin 1 Tablet(s) Oral daily  pantoprazole  Injectable 40 milliGRAM(s) IV Push every 12 hours  sodium chloride 0.9%. 1000 milliLiter(s) (10 mL/Hr) IV Continuous <Continuous>  spironolactone 12.5 milliGRAM(s) Oral every 12 hours  thiamine 100 milliGRAM(s) Oral daily  vancomycin    Solution 125 milliGRAM(s) Oral every 12 hours  vasopressin Infusion 0.017 Unit(s)/Min (1 mL/Hr) IV Continuous <Continuous>    MEDICATIONS  (PRN):  acetaminophen    Suspension .. 650 milliGRAM(s) Enteral Tube every 6 hours PRN Mild Pain (1 - 3)  simethicone 80 milliGRAM(s) Chew every 8 hours PRN Gas      Social History: see consult    Family history: see consult    ROS:   ENT: all negative except as noted in HPI   Pulm: denies SOB, cough, hemoptysis  Neuro: denies numbness/tingling, loss of sensation  Endo: denies heat/cold intolerance, excessive sweating      Vital Signs Last 24 Hrs  T(C): 37 (06 Oct 2021 04:00), Max: 37 (06 Oct 2021 00:00)  T(F): 98.6 (06 Oct 2021 04:00), Max: 98.6 (06 Oct 2021 00:00)  HR: 86 (06 Oct 2021 09:00) (53 - 99)  BP: --  BP(mean): --  RR: 17 (06 Oct 2021 09:00) (9 - 31)  SpO2: 100% (06 Oct 2021 09:00) (89% - 100%)                          10.9   11.59 )-----------( 130      ( 06 Oct 2021 00:26 )             33.7    10-06    134<L>  |  102  |  10  ----------------------------<  87  4.0   |  18<L>  |  0.42<L>    Ca    9.8      06 Oct 2021 00:26  Phos  2.7     10-06  Mg     1.9     10-06    TPro  7.8  /  Alb  4.0  /  TBili  1.6<H>  /  DBili  x   /  AST  18  /  ALT  20  /  AlkPhos  177<H>  10-06       PHYSICAL EXAM:  Gen: NAD, well-developed  Head: Normocephalic, Atraumatic  Face: no edema/erythema/fluctuance  Eyes:  no scleral injection  Nose: Nares bilaterally patent, no discharge  Mouth: No Stridor / Drooling / Trismus.  Mucosa moist, tongue/uvula midline, oropharynx clear  Neck: #6 DCT inflated cuff with minimal serosanguinous oozing from stoma, no active bleeding, sutures x4 and umbilical tie in place.  No lymphadenopathy, trachea midline, no masses  Resp: ventilating well, satting   CV: no peripheral edema/cyanosis

## 2021-10-06 NOTE — PROGRESS NOTE ADULT - PROBLEM SELECTOR PLAN 2
- Stable w/o evidence of LVAD malfunction, settings as above  - Unable to tolerate AC or ECASA due to recurrent GI bleeding  - LDH stable

## 2021-10-06 NOTE — BH CONSULTATION LIAISON PROGRESS NOTE - NSBHFUPINTERVALHXFT_PSY_A_CORE
team requested psych f/u for depression. Pt continues to remain mostly nonverbal, answers with one word  at times, difficult to understand due to trach. pt shaked head yes when asked  if he was feeling depressed. pt denies si/hi. pt with eyes mostly closed, lying in bed. 
team requested psych f/u for capacity to refuse SMA stenosis stent. As per team, pt has been ambivalent about what approach he wants to take during his course, does not want palliative care, but also does not want to have the stents procedure done. Pt is unable to explain risks/benefits of stent procedure. Pt appears confused, difficult to speak due to trach, answers with few words. Pt unable to explain reasons for admission, recent hospital course. no si/hi. no complaints. no prns needed.

## 2021-10-06 NOTE — PROGRESS NOTE ADULT - SUBJECTIVE AND OBJECTIVE BOX
RICKY JOINT  MRN#: [id number]  Subjective:  Pulmonary progress  : recurrent Acute hypoxic respiratory Failure ,aspiration pneumonia, NICM  ,   65M PMH ACC/AHA stage D HF due to NICM HM2 LVAD , TV annuloplasty ring 17 as destination therapy due to severe peripheral artery disease with significant stenosis  SIADH, Depression, CKD-3 with hyperkalemia, past E. coli UTIs, driveline drainage (21) and COVID-19 (back in 2020)  He was recently seen in clinic where he complained of abdominal pain and dark stools w constipation back in May. He presents to [facility] ER today weakness and fatigue, moderate and + Black stools for three days, on coumadin secondary to warfarin use in the setting of an LVAD. Patient has required transfusions for GIB in the past. Mostly recently back in 2021 pt had anemia with dark stools. No interventions was done at that time. However Last Endoscopy was done in 2020 (negative). Today labs show patient is anemic with H/H of 4.5/16.3,. INR is 8.84 MAP in the 90s, Temp 35.1. He denies any chest pain, shortness of breath, dizziness, abd pain, nausea or vomiting. found to have  rectal bleeding underwent endoscopy ,old blood in the proximal ileum ,  develop sepsis with LL opacity given Antibiotics , Extubated , reintubated , Bronchoscopy on Zosyn for LL pneumonia  and Amiodarone S/P TV Annuloplasty , patient remain intubated on full ventilatory support .S/P multiple units of blood transfusion , remain on full ventilatory support on Precedex and propofol , new central IJ line , diarrhea C diff. +ve on po vancomycin and IV Flagyl,  mildly distended belly , fever start on cefepime 2gm q 8 hrs S/P tracheostomy .  new RT Subclavian central line continue on contact  isolation ,S/P  C-diff antibiotics, no more diarrhea back on full support mechanical ventilator , chest x ray show improvement in LLL air space disease, more awake and responsive on tube feeding no more diarrhea ,  no nausea or vomiting or diarrhea still very weak and tired , back on tube feeding ,still on po vancomycin , getting PT and OT at bed side ,   , no SOB getting stronger , improve muscle tone patient transfer to monitor bed still on contact isolation for C-Difficel colitis on 50% FI02, and change to Suresh  tube feeding still loose stool . H/H drop significantly require blood transfusion , most likely GI bleeding , IV heparin D/C ,  H/H is stable ., patient develop TR sided  pneumothorax require chest tube placement , RT IJ central line  placed , develop fever shaking chills , blood culture positive for serratia marcescens , start on cefepime .the patient  become hypoxic and hypotensive placed on full ventilatory support and Vasopressin , levophed and Dobutamine ,S/P blood transfusion on meropenem and vancomycin ,   , on and  off pressors , occasional agitation on Precedex .S/P IR cholecystostomy tube drainage placement in the RT upper Quadrant , resume anticoagulation chest x ray noted C-PAP trail lasted only for 2 hrs , new RT SC line and D/C RT IJ line , RT pig tail cathter has been removed , tolerating C-PAP trial placed on trach. collar 50% FI02 GI consultant noted on NG tube feeding as tolerated , develop AF RVR S/P  bolus Amiodarone  back to regular sinus Rhythm , Flat Affect depressed , back on tube feeding Vital AF at 60 cc/ hr .still intermittent abdominal pain , no fever saturation is accepted  back on full ventilatory support ,   on methimazole for hyperthyroidism ,  condition is the same ,still C/O Abdominal pain , white count is improving , no chest pain or SOB ,  .placed on Reglan 10 mg TID for gastric motility , depressed , withdrawn. , S/P  mesenteric angiogram , unable to stent SMA S/P 3 units of blood transfusion   RT IJ in place reassessed for stent in the SMA by vascular,  dark stool stable H/H ,surgical opinion for possible Lap cholecystectomy. over night events noted develop respiratory distress, , rectal bleeding, require intubation placed on full ventilatory support , FI02 is 50% also became hemianosmically unstable require triple pressor support with levophed , vasopressin and norsynephrine, pan culture placed  on Vancomycin IV and PO  and Zosyn, sedated with propofol kept NPO , new central line RT and left IJ cathter placed . Diflucan Added .fever of 38.5 weaned off  vasopressin , all culture are negative, resume tube feeding ,  white count is improving , on meropenem, vancomycin solution  , on Precedex, no over night events , S/P  tracheostomy , bleeding receiving blood transfusion on vasopressin , no more bleeding , no fever      (2021 16:57)    PAST MEDICAL & SURGICAL HISTORY:  CHF (congestive heart failure)    CAD (coronary artery disease)  Depression    Pleural effusion    History of 2019 novel coronavirus disease (COVID-19)  2020    Hemorrhoids    Bleeding hemorrhoids    Peripheral arterial disease    Claudication    BPH with urinary obstruction    ACC/AHA stage D systolic heart failure  Anticoagulation goal of INR 2.0 to 2.5    Falls    Clavicle fracture    CKD (chronic kidney disease), stage III    Iron deficiency anemia    H/O epistaxis    Vertigo    GI bleed    S/P TVR (tricuspid valve repair)    S/P ventricular assist device    S/P endoscopy    OBJECTIVE:  ICU Vital Signs Last 24 Hrs  T(C): 38.2 (2021 10:00), Max: 38.5 (2021 12:00)  T(F): 100.8 (2021 10:00), Max: 101.3 (2021 12:00)  HR: 65 (2021 10:00) (61 - 69)  BP: --  BP(mean): --  ABP: 105/67 (2021 10:00) (90/54 - 113/64)  ABP(mean): 77 (2021 10:00) (63 - 77)  RR: 20 (2021 10:00) (19 - 35)  SpO2: 99% (2021 10:00) (96% - 100%)       @ 07: @ 07:00  --------------------------------------------------------  IN: 2693.9 mL / OUT: 1415 mL / NET: 1278.9 mL     @ 07: @ 10:49  --------------------------------------------------------  IN: 420.8 mL / OUT: 115 mL / NET: 305.8 mL  PHYSICAL EXAM: Daily   Elderly male intubated on full ventilatory support FI02 is 40% S/P tracheostomy   on  vasopressin , Precedex , blood  transfusion   Daily Weight in k.4 (2021 00:00)  HEENT:     + NCAT  + EOMI  - Conjuctival edema   - Icterus   - Thrush   - ETT  + NGT/OGT  Neck:         + FROM  RT IJ , LT IJ  lines JVD  - Nodes - Masses + Mid-line trachea + Tracheostomy  Chest:            normal A-P diameter    Lungs:          + CTA   + Rhonchi    - Rales    - Wheezing + Decreased  LT BS   - Dullness R L  Cardiac:       + S1 + S2    + RRR   - Irregular   - S3  - S4    - Murmurs   - Rub   - Hamman’s sign   Abdomen:    + BS  + Soft + Non-tender - Distended - Organomegaly - PEG .cholecystostomy tube in place  Extremities:   - Cyanosis U/L   - Clubbing  U/L  + LE/UE Edema   + Capillary refill    + Pulses   Neuro:        - Awake   -  Alert   - Confused   - Lethargic   + Sedated  + Generalized Weakness  Skin:        - Rashes    - Erythema   + Normal incisions   + IV sites intact          HOSPITAL MEDICATIONS: All medications reviewed and analyzed  MEDICATIONS  (STANDING):  amiodarone    Tablet 200 milliGRAM(s) Oral daily  chlorhexidine 0.12% Liquid 15 milliLiter(s) Oral Mucosa every 12 hours  chlorhexidine 2% Cloths 1 Application(s) Topical <User Schedule>  dexmedetomidine Infusion 0.5 MICROgram(s)/kG/Hr (9.81 mL/Hr) IV Continuous <Continuous>  dextrose 50% Injectable 50 milliLiter(s) IV Push every 15 minutes  heparin  Infusion 400 Unit(s)/Hr (12.5 mL/Hr) IV Continuous <Continuous>  Hydromorphone  Injectable 0.5 milliGRAM(s) IV Push once  insulin lispro (ADMELOG) corrective regimen sliding scale   SubCutaneous every 6 hours  pantoprazole  Injectable 40 milliGRAM(s) IV Push every 12 hours  piperacillin/tazobactam IVPB.. 3.375 Gram(s) IV Intermittent every 8 hours  propofol Infusion 20 MICROgram(s)/kG/Min (9.42 mL/Hr) IV Continuous <Continuous>  sodium chloride 0.9% lock flush 3 milliLiter(s) IV Push every 8 hours  sodium chloride 0.9%. 1000 milliLiter(s) (10 mL/Hr) IV Continuous <Continuous>    MEDICATIONS  (PRN):  acetaminophen    Suspension .. 650 milliGRAM(s) Oral every 6 hours PRN Temp greater or equal to 38C (100.4F)    LABS: All Lab data reviewed and analyzed                         10.9   11.59 )-----------( 130      ( 06 Oct 2021 00:26 )             33.7   10-    134<L>  |  102  |  10  ----------------------------<  87  4.0   |  18<L>  |  0.42<L>    Ca    9.8      06 Oct 2021 00:26  Phos  2.7     10-  Mg     1.9     10-    TPro  7.8  /  Alb  4.0  /  TBili  1.6<H>  /  DBili  x   /  AST  18  /  ALT  20  /  AlkPhos  177<H>  10      Ca    9.1      03 Oct 2021 14:17  Phos  2.5     10-  Mg     2.1     10-    TPro  6.8  /  Alb  3.5  /  TBili  1.5<H>  /  DBili  x   /  AST  19  /  ALT  19  /  AlkPhos  178<H>  10-03      Ca    9.9      02 Oct 2021 00:34  Phos  2.5     10-  Mg     1.7     10-    TPro  6.8  /  Alb  3.3  /  TBili  2.1<H>  /  DBili  x   /  AST  14  /  ALT  17  /  AlkPhos  152<H>  10-02    Ca    9.6      01 Oct 2021 01:25  Phos  3.5     10-  Mg     1.9     10-    TPro  6.6  /  Alb  3.3  /  TBili  4.0<H>  /  DBili  x   /  AST  15  /  ALT  16  /  AlkPhos  150<H>  10-01                                                                                                                                                                  PTT - ( 2021 04:52 )  PTT:45.2 sec LIVER FUNCTIONS - ( 2021 00:42 )  Alb: 3.4 g/dL / Pro: 6.7 g/dL / ALK PHOS: 213 U/L / ALT: 15 U/L / AST: 24 U/L / GGT: x           RADIOLOGY: - Reviewed and analyzed RT Pig tail cathter  , LVAD HM2, CT scan of abdomen reviewed result noted

## 2021-10-06 NOTE — CHART NOTE - NSCHARTNOTEFT_GEN_A_CORE
Ethics follow up consult initiated.    66 year old male with complex medical history and prolonged hospital course, which Ethics team was following, is now refusing procedures that will prolong his life, but is also saying he doesn't want to die. The medical team is questioning his decision making capacity and reconsulted Ethics to assist with goals of care discussions.    I spoke with patient today and based on my conversation and I believe he does have capacity, although perhaps limited. He was awake and conversive but due to tracheostomy was unable to verbalize. We communicated using yes and no questions to which he could shake his head, or use a motion response. He was able to tell me his name and date of birth, the month and year, which hospital he was in and the president. He is able to tell me if he doesn't do the procedure he will die, but he was not able to tell me the indications for the procedure or how the procedure is done and what the potential benefits and risks are of the procedure. He would like someone to explain it to him so he can understand it, and requested it be done in his native language, Creole.    I spoke with Dr. Pierce and discussed that the team should make every effort to insure he fully understands the indications, risks, benefits, and alternatives of the procedures in question by speaking to him via a Creole  and in language he understands. Once that is done Ethics will re-assess his decision making capacity, and if he has capacity he has a right to choose not to go through the procedure(s).    Discussed with Ethicist Jaclyn Green.    Full consult note to follow. Ethics follow up consult initiated.    66 year old male with complex medical history and prolonged hospital course, which Ethics team was following, is now refusing procedures that will prolong his life, but is also saying he doesn't want to die. The medical team is questioning his decision making capacity and reconsulted Ethics to assist with goals of care discussions.    I spoke with patient today and based on my conversation, I believe he does have capacity, although perhaps limited. He was awake and conversive but due to tracheostomy was unable to verbalize. We communicated using yes and no questions to which he could shake his head, or use a motion response. He was able to tell me his name and date of birth, the month and year, which hospital he was in and the president. He is able to tell me if he doesn't do the procedure he will die, but he was not able to tell me the indications for the procedure or how the procedure is done and what the potential benefits and risks are of the procedure. He would like someone to explain it to him so he can understand it, and requested it be done in his native language, Creole.    I spoke with Dr. Pierce and discussed that the team should make every effort to ensure he fully understands the indications, risks, benefits, and alternatives of the procedures in question by speaking to him via a Creole  and in language he understands. Once that is done Ethics will re-assess his decision making capacity, and if he has capacity he has a right to choose not to go through the procedure(s).    Discussed with Ethicist Jaclyn Green.    Full consult note to follow. Ethics follow up consult initiated.    66 year old male with complex medical history and prolonged hospital course, which Ethics team was following, is now refusing procedures that will prolong his life, but is also saying he doesn't want to die. The medical team is questioning his decision making capacity and reconsulted Ethics to assist with goals of care discussions.    I spoke with patient today and based on my conversation, I believe he does have capacity, although perhaps limited. He was awake and conversive but due to tracheostomy was unable to verbalize. We communicated using yes and no questions to which he could shake his head, or use a motion response. He was able to tell me his name and date of birth, the month and year, which hospital he was in and the president. He is able to tell me if he doesn't do the procedure he will die, but he was not able to tell me the indications for the procedure or how the procedure is done and what the potential benefits and risks are of the procedure. He would like someone to explain it to him so he can understand it, and requested it be done in his native language, Creole.    I spoke with Dr. Pierce and discussed that the team should make every effort to ensure he fully understands the indications, risks, benefits, and alternatives of the procedures in question by speaking to him via a Creole  and in language he understands. Once that is done Ethics will re-assess his decision making capacity, and if he has capacity he has a right to choose not to go through the procedure(s).    Discussed with Ethicist Jaclyn Green.    Full consult note to follow.    ADDENDUM: Reviewed and agree with Ethics Fellow note above. Reviewed  note.  has deemed patient without capacity to refuse a procedure, specifically the stents and to defer to the HCP. First, we must try to communicate with him in his primary language (as above). Second, a patient may not have capacity for a decision and in that case we look to the HCP/surrogate. EVEN THOUGH A PATIENT MAY NOT HAVE CAPACITY FOR A SPECIFIC DECISION, HE HAS THE RIGHT TO REFUSE THE INTERVENTION. In those cases, unless the procedure is EMERGENT (TO SAVE LIFE OR LIMB), we CANNOT PROCEED IF THE PATIENT IS REFUSING. If the procedure is non-emergent and the patient is actively refusing, we may need legal intervention if necessary.     Ethics consult in progress.    Jaclyn Green MD  Ethics Attending  [phone]

## 2021-10-06 NOTE — PROGRESS NOTE ADULT - PROBLEM SELECTOR PLAN 5
- Unclear source, likely enteric  - Prior cholecystitis w/ per dawn drain with bilious output  - Continue meropenem and PO vanc, recs as per ID

## 2021-10-06 NOTE — PROGRESS NOTE ADULT - SUBJECTIVE AND OBJECTIVE BOX
S  Pt seen at bedside. With trach collar    O  Vital Signs Last 24 Hrs  T(C): 36.8 (06 Oct 2021 07:00), Max: 37 (06 Oct 2021 00:00)  T(F): 98.2 (06 Oct 2021 07:00), Max: 98.6 (06 Oct 2021 00:00)  HR: 104 (06 Oct 2021 10:45) (53 - 104)  BP: --  BP(mean): --  RR: 22 (06 Oct 2021 10:45) (9 - 31)  SpO2: 100% (06 Oct 2021 10:45) (89% - 100%)    I&O's Summary    05 Oct 2021 07:01  -  06 Oct 2021 07:00  --------------------------------------------------------  IN: 3223.6 mL / OUT: 3220 mL / NET: 3.6 mL        PHYSICAL EXAM:  Gen: NAD, well-developed, responsive with head shakes  HEENT: trach collar  Abd: soft, non-distended, mild tenderness

## 2021-10-06 NOTE — PROGRESS NOTE ADULT - PROBLEM SELECTOR PLAN 1
- ACC/AHA stage D systolic heart failure s/p LVAD - stable without evidence of device malfunction  - MAP goal 70, on vasopressin 0.03 this morning  - Aldactone if can tolerate

## 2021-10-06 NOTE — BH CONSULTATION LIAISON PROGRESS NOTE - NSBHASSESSMENTFT_PSY_ALL_CORE
Patient is a 66 year-old male, with no known psychiatry hx, no past inpatient psychiatric hospitalizations, complicated PMH of stage D NICM s/p HM2, recurrent syncope post LVAD, chronic abdominal pain admitted to [facility] on 6/14/21 for symptomatic anemia, with an extensive hospital course including tracheostomy for probable aspiration PNA, recurrent GI bleed, sepsis, and BH CL was consulted for assessment of depression in the context of patient refusing tube feeds. The patient does not currently meet criteria for MDD. The patient's behavior could be explained by adjustment d/o in the context of his complex medical picture, also mostly delirium.     Can continue with the patient's Remeron  
Patient is a 66 year-old male, with no known psychiatry hx, no past inpatient psychiatric hospitalizations, complicated PMH of stage D NICM s/p HM2, recurrent syncope post LVAD, chronic abdominal pain admitted to [facility] on 6/14/21 for symptomatic anemia, with an extensive hospital course including tracheostomy for probable aspiration PNA, recurrent GI bleed, sepsis, and BH CL was consulted for assessment of depression in the context of patient refusing tube feeds. The patient does not currently meet criteria for MDD. The patient's behavior could be explained by adjustment d/o in the context of his complex medical picture, also mostly delirium.

## 2021-10-06 NOTE — PROGRESS NOTE ADULT - ASSESSMENT
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 coli UTIs, driveline drainage (1/7/21) and COVID-19, here initially for GIB prolonged hospital course, s/p tracheostomy, acalculous cholecystitis s/p perc dawn. Patient has persistent intermittent abdominal pain, CTA showing possible proximal SMA stenosis. CTA poor quality limited by significant streak artifact. Mesenteric duplex velocities without evidence of significant stenosis, however study unreliable in the setting of patient's augmented systolic function given LVAD. Now s/p diagnostic mesenteric angiogram with unsuccessful SMA stenting. 09/12 CTA obtained due to downtrending H/H with evidence of L RP hematoma without evidence of active extravasation, L groin wound stable. H/H stable. Vascular re-consulted for potential attempt at second intervention given improved patient condition    PLAN:  - Discussed procedure with patient who demonstrated understanding, says that he does not want intervention however primary team will consult psych to assess capacity    Vascular Surgery  [id number]

## 2021-10-06 NOTE — PROGRESS NOTE ADULT - SUBJECTIVE AND OBJECTIVE BOX
RICKY HAMPTON  MRN-[id number]  Patient is a 66y old  Male who presents with a chief complaint of Anemia, Supratherapeutic INR, Dark Stools (05 Oct 2021 19:42)    HPI:  65M PMH ACC/AHA stage D HF due to NICM HM2 LVAD , TV annuloplasty ring 17 as destination therapy due to severe peripheral artery disease with significant stenosis  SIADH, Depression, CKD-3 with hyperkalemia, past E. coli UTIs, driveline drainage (21) and COVID-19 (back in 2020)  He was recently seen in clinic where he complained of abdominal pain and dark stools w constipation back in May. He presents to [facility] ER today weakness and fatigue, moderate and + Black stools for three days, on coumadin secondary to warfarin use in the setting of an LVAD. Patient has required transfusions for GIB in the past. Mostly recently back in 2021 pt had anemia with dark stools. No interventions was done at that time. However Last Endoscopy was done in 2020 (negative). Today labs show patient is anemic with H/H of 4.5/16.3,. INR is 8.84 MAP in the 90s, Temp 35.1. He denies any chest pain, shortness of breath, dizziness, abd pain, nausea or vomiting.       (2021 16:57)      Surgery/Hospital Course:   admit for melena w/ anemia, INR 8.84   6/15 Capsul study (+) for small bowel bleed, balloon endoscopy (old blood in prox ileum); post EGD - septic w/ L opacity, re-intubated for concern for aspiration, TTE (Mod MR, decrease biV w/ interventricular septum boweing towards R)   bronch    +C Diff    CT C/A/P: Fluid filled colon which may be 2/2 rapid transit. Small bilateral pleffs with associates. Compressive atelectasis New ISABELLE & LLL  parenchymal opacities, suspicious for pneumonia. Moderate stenosis in the proximal superior mesenteric artery.    #8 Shiley trach at bedside    LVAD speed increased to 9200   Bronch   TC since . Patient transferred to SDU.    INR today 2.64.  H&H 7.3/24 this AM.  Will repeat CBC at noon, and will send stool guaiac Patient with persistent abdominal tenderness, rate of tube feeds decreased.  No nausea/vomiting.     INR today 2.4. H&H 9.1/28.6 low flow overnight /N&V, refusing Tube feeds on D5 1/2 normal  @50 cc/hr   INR 2.69  H&H 7.7/.1 refusing Tube feeds on D5 1/2 normal  @50 cc/hr. This am + BM Melena Dr Oneill HF  aware- PRBC x1  GI team consulted -  NPO  plan on study in am-  D/w Dr Cadet Patient  to return to CTU for further management; 1PRBCS    Post op INR 2.2 today.  No bleeding. BC + for SM.  Pt is hypotensive requiring pressor and inotropic support.  ID follow up today on Cefepime will follow.   R PTC for PTX    CT C/A/P: sub q emphysema in R chest wall, GGO RUL, small ascites CTH negative; Abd US: GB thickening, pericholecystic fluid     Perchole drain in place continues to drain total output overnight 133.  Fever today 38.8    duplex LE negative    Patient with persistent abdominal pain, refusing tube feeds and medications, Psych consulted   CTA A/P ordered to r/o mesenteric ischemia 2/2 persistent anorexia, nausea, vomiting. Revealed:  Evaluation of the mesenteric vessels is limited by streak artifact from LVAD. There appears to be severe stenosis of the proximal SMA; abdominal mesenteric doppler is recommended for further evaluation. 2.  No small bowel findings to suggest acute mesenteric ischemia. 3.  Focal dissections involving the right and left common iliac arteries.  8/15: Cultures resulted BC 1/2 +GPC in clusters, SC enterobacter; mesenteric duplex: borderline stenosis of proximal SMA  : CT C/A/P noncon: Nondular opacities in R lung apex w/cavitation, abd nl  :  Continue current care, treatment of thyrotoxicosis with medications as per endocrine, d/c ABX as per team.    RUQ sono: Contracted gallbladder with cholecystostomy tube in place.  9/10 failed SMA stent, L fem PSA, s/p 3U PRCB   CTA Abd/Pelvis L RP hematoma    Trach decannulated    intubated fro resp distress for increased WOB, LL pneumonia; CT C/A/P: progressive ISABELLE opacities and L consolidations, multifocal pneumonia    TTE (EF 25%, decreased RV, mod MR)   10/3 VT -> Lidocaine gtt   10/4 Trach size 6 DCT cuff  10/5 CT abd (neg for intra-abd abcess, severe stenosis of prox SMA and b/l iliac arteries), Trach 8 jasminley     Today:    REVIEW OF SYSTEMS:  Unable to obtain, pt is intubated and sedated.     ICU Vital Signs Last 24 Hrs  T(C): 37 (06 Oct 2021 04:00), Max: 37 (05 Oct 2021 08:00)  T(F): 98.6 (06 Oct 2021 04:00), Max: 98.6 (05 Oct 2021 08:00)  HR: 86 (06 Oct 2021 06:00) (53 - 99)  BP: --  BP(mean): --  ABP: 75/59 (06 Oct 2021 06:00) (69/59 - 163/106)  ABP(mean): 66 (06 Oct 2021 06:00) (62 - 137)  RR: 31 (06 Oct 2021 06:00) (9 - 31)  SpO2: 97% (06 Oct 2021 06:00) (89% - 100%)      Physical Exam:  Gen:  Awake, alert   CNS: non focal 	  Neck: no JVD  RES : clear , no wheezing              CVS: Regular  rhythm. Normal S1/S2  Abd: Soft, non-distended. Bowel sounds present.  Skin: No rash.  Ext:  no edema    ============================I/O===========================   I&O's Detail    04 Oct 2021 07:01  -  05 Oct 2021 07:00  --------------------------------------------------------  IN:    Albumin 5%  - 250 mL: 500 mL    Dexmedetomidine: 297.3 mL    Enteral Tube Flush: 120 mL    IV PiggyBack: 666.5 mL    IV PiggyBack: 200 mL    Miscellaneous Tube Feedin mL    sodium chloride 0.9%: 230 mL    Vasopressin: 2 mL  Total IN: 2415.8 mL    OUT:    Drain (mL): 285 mL    Indwelling Catheter - Urethral (mL): 1095 mL    Lidocaine: 0 mL  Total OUT: 1380 mL    Total NET: 1035.8 mL      05 Oct 2021 07:01  -  06 Oct 2021 06:35  --------------------------------------------------------  IN:    Dexmedetomidine: 402.3 mL    Enteral Tube Flush: 850 mL    IV PiggyBack: 300 mL    IV PiggyBack: 366.6 mL    Miscellaneous Tube Feedin mL    PRBCs (Packed Red Blood Cells): 300 mL    PRBCs (Packed Red Blood Cells): 300 mL    sodium chloride 0.9%: 230 mL    Vasopressin: 12 mL  Total IN: 3180.9 mL    OUT:    Drain (mL): 75 mL    Indwelling Catheter - Urethral (mL): 2930 mL  Total OUT: 3005 mL    Total NET: 175.9 mL        ============================ LABS =========================                        10.9   11.59 )-----------( 130      ( 06 Oct 2021 00:26 )             33.7     10-    134<L>  |  102  |  10  ----------------------------<  87  4.0   |  18<L>  |  0.42<L>    Ca    9.8      06 Oct 2021 00:26  Phos  2.7     10-  Mg     1.9     10-    TPro  7.8  /  Alb  4.0  /  TBili  1.6<H>  /  DBili  x   /  AST  18  /  ALT  20  /  AlkPhos  177<H>  10    LIVER FUNCTIONS - ( 06 Oct 2021 00:26 )  Alb: 4.0 g/dL / Pro: 7.8 g/dL / ALK PHOS: 177 U/L / ALT: 20 U/L / AST: 18 U/L / GGT: x             ABG - ( 06 Oct 2021 04:50 )  pH, Arterial: 7.35  pH, Blood: x     /  pCO2: 38    /  pO2: 181   / HCO3: 21    / Base Excess: -4.2  /  SaO2: 99.8                ======================Micro/Rad/Cardio=================  Culture: Reviewed   CXR: Reviewed  Echo:Reviewed  ======================================================  PAST MEDICAL & SURGICAL HISTORY:  CHF (congestive heart failure)    CAD (coronary artery disease)    Depression    Pleural effusion    History of 2019 novel coronavirus disease (COVID-19)  2020    Hemorrhoids    Bleeding hemorrhoids    Peripheral arterial disease    Claudication    BPH with urinary obstruction    ACC/AHA stage D systolic heart failure    Anticoagulation goal of INR 2.0 to 2.5    Falls    Clavicle fracture    CKD (chronic kidney disease), stage III    Iron deficiency anemia    H/O epistaxis    Vertigo    GI bleed    S/P TVR (tricuspid valve repair)    S/P ventricular assist device    S/P endoscopy      ====================ASSESSMENT ==============  Stage D Nonischemic Cardiomyopathy, Status Post HM2 on 2017    Cardiogenic shock  Hemodynamic instability   Acute hypoxemic respiratory failure s/p trach , decannulated on ; Reintubated on    GI bleed , Status Post Enteroscopy   Anemia, in setting of melena S/P 2U PRBC   Chronic Kidney Disease  Stress hyperglycemia   C.diff positive on    Hypovolemic shock  Septic shock  Leukocytosis  GB thickening/percholecystic s/p perc choley by IT   SMA stenosis  Serratia/citrobacter pneumonia   Stenotrophomonas pneumonia   Enterobacter pneumonia   Nausea/vomiting  Deconditioning    Plan:  ====================== NEUROLOGY=====================  Sedated with IV Precedex to maintain vent synchrony   Tylenol PRN for analgesia   PT as tolerated     acetaminophen    Suspension .. 650 milliGRAM(s) Enteral Tube every 6 hours PRN Mild Pain (1 - 3)  dexMEDEtomidine Infusion 1.5 MICROgram(s)/kG/Hr (23.1 mL/Hr) IV Continuous <Continuous>  ==================== RESPIRATORY======================  Acute hypoxemic respiratory failure s/p #8 shiley trach on ; decannulated on ; Reintubated on ; trach size 6 DCT cuff on 10/4, 10/5 Trach 8 betzaida   Continue close monitoring of respiratory rate and breathing pattern, following of ABG’s with A-line monitoring, continuous pulse oximetry monitoring.   CPAP vent weaning as tolerated     Mechanical Ventilation:  Mode: AC/ CMV (Assist Control/ Continuous Mandatory Ventilation)  RR (machine): 12  TV (machine): 500  FiO2: 40  PEEP: 5  ITime: 1  MAP: 12  PIP: 18    ====================CARDIOVASCULAR==================  Stage D Nonischemic Cardiomyopathy, Status Post HM2 on 2017; LVAD settings 9200, flow 6.8   TTE 10/4:  Severely decreased LV systolic function, Diffuse hypokinesis. Mild AR. No pericardial effusion   VT on 10/4, s/p Lidocaine drip, continue close tele monitoring   Pressor support with IV Vasopressin   Holding off on Propranolol for now     vasopressin Infusion 0.017 Unit(s)/Min (1 mL/Hr) IV Continuous <Continuous>  ===================HEMATOLOGIC/ONC ===================  CTA A/P on  +L RP hematoma   Acute blood loss anemia, H/H 8.1/26.5, S/P 2U of pRBCs yesterday and monitor CBC   Thrombocytopenia, possible in setting of sepsis, now starting to trend up, 73->103, Rest of labs not consistent with DIC  Holding coumadin and ASA given recurrent GI bleeding, continue monitoring LDH   ===================== RENAL =========================  Hx of CKD, continue monitoring urine output, I&OS, BUN/Cr   Euvolemic, even fluid balance, currently off diuretics.    Replete lytes PRN. Keep K> 4 and Mg >2  C/w Aldactone for diuresis    Continue monitoring urine output, I&OS, BUN/Cr   spironolactone 12.5 milliGRAM(s) Oral every 12 hours  ==================== GASTROINTESTINAL===================  Tolerating TF Vital AF 1.5, @ goal 40 cc/hr   CT A/P 10/5 neg for intra-abd abcess, severe stenosis of prox SMA and b/l iliac arteries  CTA A/P : Evaluation of the mesenteric vessels is limited by streak artifact from LVAD. There appears to be severe stenosis of the proximal SMA; abdominal mesenteric doppler is recommended for further evaluation. 2.  No small bowel findings to suggest acute mesenteric ischemia. 3.  Focal dissections involving the right and left common iliac arteries.  Mesenteric duplex on 8/15 borderline stenosis of proximal SMA, s/p failed SMA stent, L fem RP hematoma on 9/10; No intervention for cholecystostomy tube/PEJ at this time   Reglan for gut motility  PRN Simethicone for gas  Continue Protonix for stress ulcer prophylaxis.     multivitamin 1 Tablet(s) Oral daily  pantoprazole  Injectable 40 milliGRAM(s) IV Push every 12 hours  simethicone 80 milliGRAM(s) Chew every 8 hours PRN Gas  sodium chloride 0.9%. 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       Per endocrine      - c/w Methimazole, adjust based on labs        - Check Free T4 and total T3     insulin lispro (ADMELOG) corrective regimen sliding scale   SubCutaneous every 6 hours  methimazole 20 milliGRAM(s) Oral <User Schedule>  ========================INFECTIOUS DISEASE================  Afebrile, WBC downtrending 14.03--> 11.59  Continue trending WBC and monitoring fever curve   CT C/A/P : progressive ISABELLE opacities and L consolidations, multifocal pneumonia  BCx  + Serratia marcescens, BCx 10/1 +Enterobacter cloacae complex, repeat BCx on 10/3 NGTD, c/w Meropenem   repeat BCx sent today, follow up  Vancomycin solution for C.diff prophylaxis  Will f/u antibiotic regimen with ID    meropenem  IVPB 1000 milliGRAM(s) IV Intermittent every 8 hours  vancomycin    Solution 125 milliGRAM(s) Oral every 12 hours      Patient requires continuous monitoring with bedside rhythm monitoring, pulse ox monitoring, and intermittent blood gas analysis. Care plan discussed with ICU care team. Patient remained critical and at risk for life threatening decompensation.     By signing my name below, I, Emily Roa, attest that this documentation has been prepared under the direction and in the presence of Jaclyn Mendoza NP  Electronically signed: Emily Roa Scribe, 10-06-21 @ 06:35    I, Jaclyn Mendoza NP , personally performed the services described in this documentation. all medical record entries made by the luisibmel were at my direction and in my presence. I have reviewed the chart and agree that the record reflects my personal performance and is accurate and complete  Electronically signed: Jaclyn Mendoza NP        RICKY HAMPTON  MRN-[id number]  Patient is a 66y old  Male who presents with a chief complaint of Anemia, Supratherapeutic INR, Dark Stools (05 Oct 2021 19:42)    HPI:  65M PMH ACC/AHA stage D HF due to NICM HM2 LVAD , TV annuloplasty ring 17 as destination therapy due to severe peripheral artery disease with significant stenosis  SIADH, Depression, CKD-3 with hyperkalemia, past E. coli UTIs, driveline drainage (21) and COVID-19 (back in 2020)  He was recently seen in clinic where he complained of abdominal pain and dark stools w constipation back in May. He presents to [facility] ER today weakness and fatigue, moderate and + Black stools for three days, on coumadin secondary to warfarin use in the setting of an LVAD. Patient has required transfusions for GIB in the past. Mostly recently back in 2021 pt had anemia with dark stools. No interventions was done at that time. However Last Endoscopy was done in 2020 (negative). Today labs show patient is anemic with H/H of 4.5/16.3,. INR is 8.84 MAP in the 90s, Temp 35.1. He denies any chest pain, shortness of breath, dizziness, abd pain, nausea or vomiting.       (2021 16:57)      Surgery/Hospital Course:   admit for melena w/ anemia, INR 8.84   6/15 Capsul study (+) for small bowel bleed, balloon endoscopy (old blood in prox ileum); post EGD - septic w/ L opacity, re-intubated for concern for aspiration, TTE (Mod MR, decrease biV w/ interventricular septum boweing towards R)   bronch    +C Diff    CT C/A/P: Fluid filled colon which may be 2/2 rapid transit. Small bilateral pleffs with associates. Compressive atelectasis New ISABELLE & LLL  parenchymal opacities, suspicious for pneumonia. Moderate stenosis in the proximal superior mesenteric artery.    #8 Shiley trach at bedside    LVAD speed increased to 9200   Bronch   TC since . Patient transferred to SDU.    INR today 2.64.  H&H 7.3/24 this AM.  Will repeat CBC at noon, and will send stool guaiac Patient with persistent abdominal tenderness, rate of tube feeds decreased.  No nausea/vomiting.     INR today 2.4. H&H 9.1/28.6 low flow overnight /N&V, refusing Tube feeds on D5 1/2 normal  @50 cc/hr   INR 2.69  H&H 7.7/.1 refusing Tube feeds on D5 1/2 normal  @50 cc/hr. This am + BM Melena Dr Oneill HF  aware- PRBC x1  GI team consulted -  NPO  plan on study in am-  D/w Dr Cadet Patient  to return to CTU for further management; 1PRBCS    Post op INR 2.2 today.  No bleeding. BC + for SM.  Pt is hypotensive requiring pressor and inotropic support.  ID follow up today on Cefepime will follow.   R PTC for PTX    CT C/A/P: sub q emphysema in R chest wall, GGO RUL, small ascites CTH negative; Abd US: GB thickening, pericholecystic fluid     Perchole drain in place continues to drain total output overnight 133.  Fever today 38.8    duplex LE negative    Patient with persistent abdominal pain, refusing tube feeds and medications, Psych consulted   CTA A/P ordered to r/o mesenteric ischemia 2/2 persistent anorexia, nausea, vomiting. Revealed:  Evaluation of the mesenteric vessels is limited by streak artifact from LVAD. There appears to be severe stenosis of the proximal SMA; abdominal mesenteric doppler is recommended for further evaluation. 2.  No small bowel findings to suggest acute mesenteric ischemia. 3.  Focal dissections involving the right and left common iliac arteries.  8/15: Cultures resulted BC 1/2 +GPC in clusters, SC enterobacter; mesenteric duplex: borderline stenosis of proximal SMA  : CT C/A/P noncon: Nondular opacities in R lung apex w/cavitation, abd nl  :  Continue current care, treatment of thyrotoxicosis with medications as per endocrine, d/c ABX as per team.    RUQ sono: Contracted gallbladder with cholecystostomy tube in place.  9/10 failed SMA stent, L fem PSA, s/p 3U PRCB   CTA Abd/Pelvis L RP hematoma    Trach decannulated    intubated fro resp distress for increased WOB, LL pneumonia; CT C/A/P: progressive ISABELLE opacities and L consolidations, multifocal pneumonia    TTE (EF 25%, decreased RV, mod MR)   10/3 VT -> Lidocaine gtt   10/4 Trach size 6 DCT cuff  10/5 CT abd (neg for intra-abd abcess, severe stenosis of prox SMA and b/l iliac arteries)    Today:    REVIEW OF SYSTEMS:  Unable to obtain, pt is intubated and sedated.     ICU Vital Signs Last 24 Hrs  T(C): 37 (06 Oct 2021 04:00), Max: 37 (05 Oct 2021 08:00)  T(F): 98.6 (06 Oct 2021 04:00), Max: 98.6 (05 Oct 2021 08:00)  HR: 86 (06 Oct 2021 06:00) (53 - 99)  BP: --  BP(mean): --  ABP: 75/59 (06 Oct 2021 06:00) (69/59 - 163/106)  ABP(mean): 66 (06 Oct 2021 06:00) (62 - 137)  RR: 31 (06 Oct 2021 06:00) (9 - 31)  SpO2: 97% (06 Oct 2021 06:00) (89% - 100%)      Physical Exam:  Gen:  Awake, alert   CNS: non focal 	  Neck: no JVD  RES : clear , no wheezing              CVS: Regular  rhythm. Normal S1/S2  Abd: Soft, non-distended. Bowel sounds present.  Skin: No rash.  Ext:  no edema    ============================I/O===========================   I&O's Detail    04 Oct 2021 07:01  -  05 Oct 2021 07:00  --------------------------------------------------------  IN:    Albumin 5%  - 250 mL: 500 mL    Dexmedetomidine: 297.3 mL    Enteral Tube Flush: 120 mL    IV PiggyBack: 666.5 mL    IV PiggyBack: 200 mL    Miscellaneous Tube Feedin mL    sodium chloride 0.9%: 230 mL    Vasopressin: 2 mL  Total IN: 2415.8 mL    OUT:    Drain (mL): 285 mL    Indwelling Catheter - Urethral (mL): 1095 mL    Lidocaine: 0 mL  Total OUT: 1380 mL    Total NET: 1035.8 mL      05 Oct 2021 07:01  -  06 Oct 2021 06:35  --------------------------------------------------------  IN:    Dexmedetomidine: 402.3 mL    Enteral Tube Flush: 850 mL    IV PiggyBack: 300 mL    IV PiggyBack: 366.6 mL    Miscellaneous Tube Feedin mL    PRBCs (Packed Red Blood Cells): 300 mL    PRBCs (Packed Red Blood Cells): 300 mL    sodium chloride 0.9%: 230 mL    Vasopressin: 12 mL  Total IN: 3180.9 mL    OUT:    Drain (mL): 75 mL    Indwelling Catheter - Urethral (mL): 2930 mL  Total OUT: 3005 mL    Total NET: 175.9 mL        ============================ LABS =========================                        10.9   11.59 )-----------( 130      ( 06 Oct 2021 00:26 )             33.7     10-    134<L>  |  102  |  10  ----------------------------<  87  4.0   |  18<L>  |  0.42<L>    Ca    9.8      06 Oct 2021 00:26  Phos  2.7     10-  Mg     1.9     10-    TPro  7.8  /  Alb  4.0  /  TBili  1.6<H>  /  DBili  x   /  AST  18  /  ALT  20  /  AlkPhos  177<H>  10    LIVER FUNCTIONS - ( 06 Oct 2021 00:26 )  Alb: 4.0 g/dL / Pro: 7.8 g/dL / ALK PHOS: 177 U/L / ALT: 20 U/L / AST: 18 U/L / GGT: x             ABG - ( 06 Oct 2021 04:50 )  pH, Arterial: 7.35  pH, Blood: x     /  pCO2: 38    /  pO2: 181   / HCO3: 21    / Base Excess: -4.2  /  SaO2: 99.8                ======================Micro/Rad/Cardio=================  Culture: Reviewed   CXR: Reviewed  Echo:Reviewed  ======================================================  PAST MEDICAL & SURGICAL HISTORY:  CHF (congestive heart failure)    CAD (coronary artery disease)    Depression    Pleural effusion    History of 2019 novel coronavirus disease (COVID-19)  2020    Hemorrhoids    Bleeding hemorrhoids    Peripheral arterial disease    Claudication    BPH with urinary obstruction    ACC/AHA stage D systolic heart failure    Anticoagulation goal of INR 2.0 to 2.5    Falls    Clavicle fracture    CKD (chronic kidney disease), stage III    Iron deficiency anemia    H/O epistaxis    Vertigo    GI bleed    S/P TVR (tricuspid valve repair)    S/P ventricular assist device    S/P endoscopy      ====================ASSESSMENT ==============  Stage D Nonischemic Cardiomyopathy, Status Post HM2 on 2017    Cardiogenic shock  Hemodynamic instability   Acute hypoxemic respiratory failure s/p trach , decannulated on ; Reintubated on    GI bleed , Status Post Enteroscopy   Anemia, in setting of melena S/P 2U PRBC   Chronic Kidney Disease  Stress hyperglycemia   C.diff positive on    Hypovolemic shock  Septic shock  Leukocytosis  GB thickening/percholecystic s/p perc choley by IT   SMA stenosis  Serratia/citrobacter pneumonia   Stenotrophomonas pneumonia   Enterobacter pneumonia   Nausea/vomiting  Deconditioning    Plan:  ====================== NEUROLOGY=====================  Sedated with IV Precedex to maintain vent synchrony   Tylenol PRN for analgesia   PT as tolerated     acetaminophen    Suspension .. 650 milliGRAM(s) Enteral Tube every 6 hours PRN Mild Pain (1 - 3)  dexMEDEtomidine Infusion 1.5 MICROgram(s)/kG/Hr (23.1 mL/Hr) IV Continuous <Continuous>  ==================== RESPIRATORY======================  Acute hypoxemic respiratory failure s/p #8 shiley trach on ; decannulated on ; Reintubated on ; trach size 6 DCT cuff on 10/4, 10/5 Trach 8 shiley   Continue close monitoring of respiratory rate and breathing pattern, following of ABG’s with A-line monitoring, continuous pulse oximetry monitoring.   CPAP vent weaning as tolerated     Mechanical Ventilation:  Mode: AC/ CMV (Assist Control/ Continuous Mandatory Ventilation)  RR (machine): 12  TV (machine): 500  FiO2: 40  PEEP: 5  ITime: 1  MAP: 12  PIP: 18    ====================CARDIOVASCULAR==================  Stage D Nonischemic Cardiomyopathy, Status Post HM2 on 2017; LVAD settings 9200, flow 6.8   TTE 10/4:  Severely decreased LV systolic function, Diffuse hypokinesis. Mild AR. No pericardial effusion   VT on 10/4, s/p Lidocaine drip, continue close tele monitoring   Pressor support with IV Vasopressin   Holding off on Propranolol for now     vasopressin Infusion 0.017 Unit(s)/Min (1 mL/Hr) IV Continuous <Continuous>  ===================HEMATOLOGIC/ONC ===================  CTA A/P on  +L RP hematoma   Acute blood loss anemia, H/H 8.1/26.5, S/P 2U of pRBCs yesterday and monitor CBC   Thrombocytopenia, possible in setting of sepsis, now starting to trend up, 73->103, Rest of labs not consistent with DIC  Holding coumadin and ASA given recurrent GI bleeding, continue monitoring LDH   ===================== RENAL =========================  Hx of CKD, continue monitoring urine output, I&OS, BUN/Cr   Euvolemic, even fluid balance, currently off diuretics.    Replete lytes PRN. Keep K> 4 and Mg >2  C/w Aldactone for diuresis    Continue monitoring urine output, I&OS, BUN/Cr   spironolactone 12.5 milliGRAM(s) Oral every 12 hours  ==================== GASTROINTESTINAL===================  Tolerating TF Vital AF 1.5, @ goal 40 cc/hr   CT A/P 10/5 neg for intra-abd abcess, severe stenosis of prox SMA and b/l iliac arteries  CTA A/P : Evaluation of the mesenteric vessels is limited by streak artifact from LVAD. There appears to be severe stenosis of the proximal SMA; abdominal mesenteric doppler is recommended for further evaluation. 2.  No small bowel findings to suggest acute mesenteric ischemia. 3.  Focal dissections involving the right and left common iliac arteries.  Mesenteric duplex on 8/15 borderline stenosis of proximal SMA, s/p failed SMA stent, L fem RP hematoma on 9/10; No intervention for cholecystostomy tube/PEJ at this time   Reglan for gut motility  PRN Simethicone for gas  Continue Protonix for stress ulcer prophylaxis.     multivitamin 1 Tablet(s) Oral daily  pantoprazole  Injectable 40 milliGRAM(s) IV Push every 12 hours  simethicone 80 milliGRAM(s) Chew every 8 hours PRN Gas  sodium chloride 0.9%. 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       Per endocrine      - c/w Methimazole, adjust based on labs        - Check Free T4 and total T3     insulin lispro (ADMELOG) corrective regimen sliding scale   SubCutaneous every 6 hours  methimazole 20 milliGRAM(s) Oral <User Schedule>  ========================INFECTIOUS DISEASE================  Afebrile, WBC downtrending 14.03--> 11.59  Continue trending WBC and monitoring fever curve   CT C/A/P : progressive ISABELLE opacities and L consolidations, multifocal pneumonia  BCx  + Serratia marcescens, BCx 10/1 +Enterobacter cloacae complex, repeat BCx on 10/3 NGTD, c/w Meropenem   repeat BCx sent today, follow up  Vancomycin solution for C.diff prophylaxis  Will f/u antibiotic regimen with ID    meropenem  IVPB 1000 milliGRAM(s) IV Intermittent every 8 hours  vancomycin    Solution 125 milliGRAM(s) Oral every 12 hours      Patient requires continuous monitoring with bedside rhythm monitoring, pulse ox monitoring, and intermittent blood gas analysis. Care plan discussed with ICU care team. Patient remained critical and at risk for life threatening decompensation.     By signing my name below, I, Emily Roa, attest that this documentation has been prepared under the direction and in the presence of Jaclyn Mendoza NP  Electronically signed: Emily Roa Scribe, 10-06-21 @ 06:35    I, Jaclyn Mendoza NP , personally performed the services described in this documentation. all medical record entries made by the luisibmel were at my direction and in my presence. I have reviewed the chart and agree that the record reflects my personal performance and is accurate and complete  Electronically signed: Jaclyn Mendoza NP        RICKY HAMPTON  MRN-[id number]  Patient is a 66y old  Male who presents with a chief complaint of Anemia, Supratherapeutic INR, Dark Stools (05 Oct 2021 19:42)    HPI:  65M PMH ACC/AHA stage D HF due to NICM HM2 LVAD , TV annuloplasty ring 17 as destination therapy due to severe peripheral artery disease with significant stenosis  SIADH, Depression, CKD-3 with hyperkalemia, past E. coli UTIs, driveline drainage (21) and COVID-19 (back in 2020)  He was recently seen in clinic where he complained of abdominal pain and dark stools w constipation back in May. He presents to [facility] ER today weakness and fatigue, moderate and + Black stools for three days, on coumadin secondary to warfarin use in the setting of an LVAD. Patient has required transfusions for GIB in the past. Mostly recently back in 2021 pt had anemia with dark stools. No interventions was done at that time. However Last Endoscopy was done in 2020 (negative). Today labs show patient is anemic with H/H of 4.5/16.3,. INR is 8.84 MAP in the 90s, Temp 35.1. He denies any chest pain, shortness of breath, dizziness, abd pain, nausea or vomiting.       (2021 16:57)      Surgery/Hospital Course:   admit for melena w/ anemia, INR 8.84   6/15 Capsul study (+) for small bowel bleed, balloon endoscopy (old blood in prox ileum); post EGD - septic w/ L opacity, re-intubated for concern for aspiration, TTE (Mod MR, decrease biV w/ interventricular septum boweing towards R)   bronch    +C Diff    CT C/A/P: Fluid filled colon which may be 2/2 rapid transit. Small bilateral pleffs with associates. Compressive atelectasis New ISABELLE & LLL  parenchymal opacities, suspicious for pneumonia. Moderate stenosis in the proximal superior mesenteric artery.    #8 Shiley trach at bedside    LVAD speed increased to 9200   Bronch   TC since . Patient transferred to SDU.    INR today 2.64.  H&H 7.3/24 this AM.  Will repeat CBC at noon, and will send stool guaiac Patient with persistent abdominal tenderness, rate of tube feeds decreased.  No nausea/vomiting.     INR today 2.4. H&H 9.1/28.6 low flow overnight /N&V, refusing Tube feeds on D5 1/2 normal  @50 cc/hr   INR 2.69  H&H 7.7/.1 refusing Tube feeds on D5 1/2 normal  @50 cc/hr. This am + BM Melena Dr Oneill HF  aware- PRBC x1  GI team consulted -  NPO  plan on study in am-  D/w Dr Cadet Patient  to return to CTU for further management; 1PRBCS    Post op INR 2.2 today.  No bleeding. BC + for SM.  Pt is hypotensive requiring pressor and inotropic support.  ID follow up today on Cefepime will follow.   R PTC for PTX    CT C/A/P: sub q emphysema in R chest wall, GGO RUL, small ascites CTH negative; Abd US: GB thickening, pericholecystic fluid     Perchole drain in place continues to drain total output overnight 133.  Fever today 38.8    duplex LE negative    Patient with persistent abdominal pain, refusing tube feeds and medications, Psych consulted   CTA A/P ordered to r/o mesenteric ischemia 2/2 persistent anorexia, nausea, vomiting. Revealed:  Evaluation of the mesenteric vessels is limited by streak artifact from LVAD. There appears to be severe stenosis of the proximal SMA; abdominal mesenteric doppler is recommended for further evaluation. 2.  No small bowel findings to suggest acute mesenteric ischemia. 3.  Focal dissections involving the right and left common iliac arteries.  8/15: Cultures resulted BC 1/2 +GPC in clusters, SC enterobacter; mesenteric duplex: borderline stenosis of proximal SMA  : CT C/A/P noncon: Nondular opacities in R lung apex w/cavitation, abd nl  :  Continue current care, treatment of thyrotoxicosis with medications as per endocrine, d/c ABX as per team.    RUQ sono: Contracted gallbladder with cholecystostomy tube in place.  9/10 failed SMA stent, L fem PSA, s/p 3U PRCB   CTA Abd/Pelvis L RP hematoma    Trach decannulated    intubated fro resp distress for increased WOB, LL pneumonia; CT C/A/P: progressive ISABELLE opacities and L consolidations, multifocal pneumonia    TTE (EF 25%, decreased RV, mod MR)   10/3 VT -> Lidocaine gtt   10/4 Trach size 6 DCT cuff  10/5 CT abd (neg for intra-abd abcess, severe stenosis of prox SMA and b/l iliac arteries)    Today:  Will call psych to determine if pt has capacity of making medical decisions. If not, will follow up with vascular whether or not to pursue SMA stent. Continue to ambulate pt as tolerated.     REVIEW OF SYSTEMS:  Unable to obtain, pt is intubated and sedated.     ICU Vital Signs Last 24 Hrs  T(C): 37 (06 Oct 2021 04:00), Max: 37 (05 Oct 2021 08:00)  T(F): 98.6 (06 Oct 2021 04:00), Max: 98.6 (05 Oct 2021 08:00)  HR: 86 (06 Oct 2021 06:00) (53 - 99)  BP: --  BP(mean): --  ABP: 75/59 (06 Oct 2021 06:00) (69/59 - 163/106)  ABP(mean): 66 (06 Oct 2021 06:00) (62 - 137)  RR: 31 (06 Oct 2021 06:00) (9 - 31)  SpO2: 97% (06 Oct 2021 06:00) (89% - 100%)      Physical Exam:  Gen:  Awake, alert   CNS: non focal 	  Neck: no JVD  RES : clear , no wheezing              CVS: Regular  rhythm. Normal S1/S2  Abd: Soft, non-distended. Bowel sounds present.  Skin: No rash.  Ext:  no edema    ============================I/O===========================   I&O's Detail    04 Oct 2021 07:01  -  05 Oct 2021 07:00  --------------------------------------------------------  IN:    Albumin 5%  - 250 mL: 500 mL    Dexmedetomidine: 297.3 mL    Enteral Tube Flush: 120 mL    IV PiggyBack: 666.5 mL    IV PiggyBack: 200 mL    Miscellaneous Tube Feedin mL    sodium chloride 0.9%: 230 mL    Vasopressin: 2 mL  Total IN: 2415.8 mL    OUT:    Drain (mL): 285 mL    Indwelling Catheter - Urethral (mL): 1095 mL    Lidocaine: 0 mL  Total OUT: 1380 mL    Total NET: 1035.8 mL      05 Oct 2021 07:01  -  06 Oct 2021 06:35  --------------------------------------------------------  IN:    Dexmedetomidine: 402.3 mL    Enteral Tube Flush: 850 mL    IV PiggyBack: 300 mL    IV PiggyBack: 366.6 mL    Miscellaneous Tube Feedin mL    PRBCs (Packed Red Blood Cells): 300 mL    PRBCs (Packed Red Blood Cells): 300 mL    sodium chloride 0.9%: 230 mL    Vasopressin: 12 mL  Total IN: 3180.9 mL    OUT:    Drain (mL): 75 mL    Indwelling Catheter - Urethral (mL): 2930 mL  Total OUT: 3005 mL    Total NET: 175.9 mL        ============================ LABS =========================                        10.9   11.59 )-----------( 130      ( 06 Oct 2021 00:26 )             33.7     10-    134<L>  |  102  |  10  ----------------------------<  87  4.0   |  18<L>  |  0.42<L>    Ca    9.8      06 Oct 2021 00:26  Phos  2.7     10-  Mg     1.9     10-    TPro  7.8  /  Alb  4.0  /  TBili  1.6<H>  /  DBili  x   /  AST  18  /  ALT  20  /  AlkPhos  177<H>  10-06    LIVER FUNCTIONS - ( 06 Oct 2021 00:26 )  Alb: 4.0 g/dL / Pro: 7.8 g/dL / ALK PHOS: 177 U/L / ALT: 20 U/L / AST: 18 U/L / GGT: x             ABG - ( 06 Oct 2021 04:50 )  pH, Arterial: 7.35  pH, Blood: x     /  pCO2: 38    /  pO2: 181   / HCO3: 21    / Base Excess: -4.2  /  SaO2: 99.8                ======================Micro/Rad/Cardio=================  Culture: Reviewed   CXR: Reviewed  Echo:Reviewed  ======================================================  PAST MEDICAL & SURGICAL HISTORY:  CHF (congestive heart failure)    CAD (coronary artery disease)    Depression    Pleural effusion    History of 2019 novel coronavirus disease (COVID-19)  2020    Hemorrhoids    Bleeding hemorrhoids    Peripheral arterial disease    Claudication    BPH with urinary obstruction    ACC/AHA stage D systolic heart failure    Anticoagulation goal of INR 2.0 to 2.5    Falls    Clavicle fracture    CKD (chronic kidney disease), stage III    Iron deficiency anemia    H/O epistaxis    Vertigo    GI bleed    S/P TVR (tricuspid valve repair)    S/P ventricular assist device    S/P endoscopy      ====================ASSESSMENT ==============  Stage D Nonischemic Cardiomyopathy, Status Post HM2 on 2017    Cardiogenic shock  Hemodynamic instability   Acute hypoxemic respiratory failure s/p trach , decannulated on ; Reintubated on    GI bleed , Status Post Enteroscopy   Anemia, in setting of melena S/P 2U PRBC   Chronic Kidney Disease  Stress hyperglycemia   C.diff positive on    Hypovolemic shock  Septic shock  Leukocytosis  GB thickening/percholecystic s/p perc choley by IT   SMA stenosis  Serratia/citrobacter pneumonia   Stenotrophomonas pneumonia   Enterobacter pneumonia   Nausea/vomiting  Deconditioning    Plan:  ====================== NEUROLOGY=====================  Sedated with IV Precedex to maintain vent synchrony   Tylenol PRN for analgesia   PT as tolerated   Will call psych to determine if pt has capacity of making medical decisions. If not, will follow up with vascular whether or not to pursue SMA stent.     acetaminophen    Suspension .. 650 milliGRAM(s) Enteral Tube every 6 hours PRN Mild Pain (1 - 3)  dexMEDEtomidine Infusion 1.5 MICROgram(s)/kG/Hr (23.1 mL/Hr) IV Continuous <Continuous>  ==================== RESPIRATORY======================  Acute hypoxemic respiratory failure s/p #8 shiley trach on ; decannulated on ; Reintubated on ; trach size 6 DCT cuff on 10/4, 10/5 Trach 8 betzaida   Continue close monitoring of respiratory rate and breathing pattern, following of ABG’s with A-line monitoring, continuous pulse oximetry monitoring.   CPAP vent weaning as tolerated     Mechanical Ventilation:  Mode: AC/ CMV (Assist Control/ Continuous Mandatory Ventilation)  RR (machine): 12  TV (machine): 500  FiO2: 40  PEEP: 5  ITime: 1  MAP: 12  PIP: 18    ====================CARDIOVASCULAR==================  Stage D Nonischemic Cardiomyopathy, Status Post HM2 on 2017; LVAD settings 9200, flow 6.8   TTE 10/4:  Severely decreased LV systolic function, Diffuse hypokinesis. Mild AR. No pericardial effusion   VT on 10/4, s/p Lidocaine drip, continue close tele monitoring   Pressor support with IV Vasopressin   Holding off on Propranolol for now     vasopressin Infusion 0.017 Unit(s)/Min (1 mL/Hr) IV Continuous <Continuous>  ===================HEMATOLOGIC/ONC ===================  CTA A/P on  +L RP hematoma   Acute blood loss anemia, H/H 8.1/26.5, S/P 2U of pRBCs yesterday and monitor CBC   Thrombocytopenia, possible in setting of sepsis, now starting to trend up, 73->103, Rest of labs not consistent with DIC  Holding coumadin and ASA given recurrent GI bleeding, continue monitoring LDH   ===================== RENAL =========================  Hx of CKD, continue monitoring urine output, I&OS, BUN/Cr   Euvolemic, even fluid balance, currently off diuretics.    Replete lytes PRN. Keep K> 4 and Mg >2  C/w Aldactone for diuresis    Continue monitoring urine output, I&OS, BUN/Cr   spironolactone 12.5 milliGRAM(s) Oral every 12 hours  ==================== GASTROINTESTINAL===================  Tolerating TF Vital AF 1.5, @ goal 40 cc/hr   CT A/P 10/5 neg for intra-abd abcess, severe stenosis of prox SMA and b/l iliac arteries  CTA A/P : Evaluation of the mesenteric vessels is limited by streak artifact from LVAD. There appears to be severe stenosis of the proximal SMA; abdominal mesenteric doppler is recommended for further evaluation. 2.  No small bowel findings to suggest acute mesenteric ischemia. 3.  Focal dissections involving the right and left common iliac arteries.  Mesenteric duplex on 8/15 borderline stenosis of proximal SMA, s/p failed SMA stent, L fem RP hematoma on 9/10; No intervention for cholecystostomy tube/PEJ at this time   Reglan for gut motility  PRN Simethicone for gas  Continue Protonix for stress ulcer prophylaxis.     multivitamin 1 Tablet(s) Oral daily  pantoprazole  Injectable 40 milliGRAM(s) IV Push every 12 hours  simethicone 80 milliGRAM(s) Chew every 8 hours PRN Gas  sodium chloride 0.9%. 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       Per endocrine      - c/w Methimazole, adjust based on labs        - Check Free T4 and total T3     insulin lispro (ADMELOG) corrective regimen sliding scale   SubCutaneous every 6 hours  methimazole 20 milliGRAM(s) Oral <User Schedule>  ========================INFECTIOUS DISEASE================  Afebrile, WBC downtrending 14.03--> 11.59  Continue trending WBC and monitoring fever curve   CT C/A/P : progressive ISABELLE opacities and L consolidations, multifocal pneumonia  BCx  + Serratia marcescens, BCx 10/1 +Enterobacter cloacae complex, repeat BCx on 10/3 NGTD, c/w Meropenem   repeat BCx sent today, follow up  Vancomycin solution for C.diff prophylaxis  Will f/u antibiotic regimen with ID    meropenem  IVPB 1000 milliGRAM(s) IV Intermittent every 8 hours  vancomycin    Solution 125 milliGRAM(s) Oral every 12 hours      Patient requires continuous monitoring with bedside rhythm monitoring, pulse ox monitoring, and intermittent blood gas analysis. Care plan discussed with ICU care team. Patient remained critical and at risk for life threatening decompensation.     By signing my name below, IJanina Tiffany, attest that this documentation has been prepared under the direction and in the presence of Jaclyn Mendoza NP  Electronically signed: Emily Roa Scribe, 10-06-21 @ 06:35    Jaclyn STANLEY NP , personally performed the services described in this documentation. all medical record entries made by the romel were at my direction and in my presence. I have reviewed the chart and agree that the record reflects my personal performance and is accurate and complete  Electronically signed: Jaclyn Mendoza NP        RICKY HAMPTON  MRN-[id number]  Patient is a 66y old  Male who presents with a chief complaint of Anemia, Supratherapeutic INR, Dark Stools (05 Oct 2021 19:42)    HPI:  65M PMH ACC/AHA stage D HF due to NICM HM2 LVAD , TV annuloplasty ring 17 as destination therapy due to severe peripheral artery disease with significant stenosis  SIADH, Depression, CKD-3 with hyperkalemia, past E. coli UTIs, driveline drainage (21) and COVID-19 (back in 2020)  He was recently seen in clinic where he complained of abdominal pain and dark stools w constipation back in May. He presents to [facility] ER today weakness and fatigue, moderate and + Black stools for three days, on coumadin secondary to warfarin use in the setting of an LVAD. Patient has required transfusions for GIB in the past. Mostly recently back in 2021 pt had anemia with dark stools. No interventions was done at that time. However Last Endoscopy was done in 2020 (negative). Today labs show patient is anemic with H/H of 4.5/16.3,. INR is 8.84 MAP in the 90s, Temp 35.1. He denies any chest pain, shortness of breath, dizziness, abd pain, nausea or vomiting.       (2021 16:57)      Surgery/Hospital Course:   admit for melena w/ anemia, INR 8.84   6/15 Capsul study (+) for small bowel bleed, balloon endoscopy (old blood in prox ileum); post EGD - septic w/ L opacity, re-intubated for concern for aspiration, TTE (Mod MR, decrease biV w/ interventricular septum boweing towards R)   bronch    +C Diff    CT C/A/P: Fluid filled colon which may be 2/2 rapid transit. Small bilateral pleffs with associates. Compressive atelectasis New ISABELLE & LLL  parenchymal opacities, suspicious for pneumonia. Moderate stenosis in the proximal superior mesenteric artery.    #8 Shiley trach at bedside    LVAD speed increased to 9200   Bronch   TC since . Patient transferred to SDU.    INR today 2.64.  H&H 7.3/24 this AM.  Will repeat CBC at noon, and will send stool guaiac Patient with persistent abdominal tenderness, rate of tube feeds decreased.  No nausea/vomiting.     INR today 2.4. H&H 9.1/28.6 low flow overnight /N&V, refusing Tube feeds on D5 1/2 normal  @50 cc/hr   INR 2.69  H&H 7.7/.1 refusing Tube feeds on D5 1/2 normal  @50 cc/hr. This am + BM Melena Dr Oneill HF  aware- PRBC x1  GI team consulted -  NPO  plan on study in am-  D/w Dr Cadet Patient  to return to CTU for further management; 1PRBCS    Post op INR 2.2 today.  No bleeding. BC + for SM.  Pt is hypotensive requiring pressor and inotropic support.  ID follow up today on Cefepime will follow.   R PTC for PTX    CT C/A/P: sub q emphysema in R chest wall, GGO RUL, small ascites CTH negative; Abd US: GB thickening, pericholecystic fluid     Perchole drain in place continues to drain total output overnight 133.  Fever today 38.8    duplex LE negative    Patient with persistent abdominal pain, refusing tube feeds and medications, Psych consulted   CTA A/P ordered to r/o mesenteric ischemia 2/2 persistent anorexia, nausea, vomiting. Revealed:  Evaluation of the mesenteric vessels is limited by streak artifact from LVAD. There appears to be severe stenosis of the proximal SMA; abdominal mesenteric doppler is recommended for further evaluation. 2.  No small bowel findings to suggest acute mesenteric ischemia. 3.  Focal dissections involving the right and left common iliac arteries.  8/15: Cultures resulted BC 1/2 +GPC in clusters, SC enterobacter; mesenteric duplex: borderline stenosis of proximal SMA  : CT C/A/P noncon: Nondular opacities in R lung apex w/cavitation, abd nl  :  Continue current care, treatment of thyrotoxicosis with medications as per endocrine, d/c ABX as per team.    RUQ sono: Contracted gallbladder with cholecystostomy tube in place.  9/10 failed SMA stent, L fem PSA, s/p 3U PRCB   CTA Abd/Pelvis L RP hematoma    Trach decannulated    intubated fro resp distress for increased WOB, LL pneumonia; CT C/A/P: progressive ISABELLE opacities and L consolidations, multifocal pneumonia    TTE (EF 25%, decreased RV, mod MR)   10/3 VT -> Lidocaine gtt   10/4 Trach size 6 DCT cuff  10/5 CT abd (neg for intra-abd abcess, severe stenosis of prox SMA and b/l iliac arteries)    Today:  Will call psych/ethics to determine if pt has capacity of making medical decisions. If not, will follow up with vascular whether or not to pursue SMA stent. Wean vasopressors as tolerated.     REVIEW OF SYSTEMS:  Unable to obtain, pt is intubated and sedated.     ICU Vital Signs Last 24 Hrs  T(C): 37 (06 Oct 2021 04:00), Max: 37 (05 Oct 2021 08:00)  T(F): 98.6 (06 Oct 2021 04:00), Max: 98.6 (05 Oct 2021 08:00)  HR: 86 (06 Oct 2021 06:00) (53 - 99)  BP: --  BP(mean): --  ABP: 75/59 (06 Oct 2021 06:00) (69/59 - 163/106)  ABP(mean): 66 (06 Oct 2021 06:00) (62 - 137)  RR: 31 (06 Oct 2021 06:00) (9 - 31)  SpO2: 97% (06 Oct 2021 06:00) (89% - 100%)      Physical Exam:  Gen:  Awake, alert   CNS: non focal 	  Neck: no JVD  RES : clear , no wheezing              CVS: Regular  rhythm. Normal S1/S2  Abd: Soft, non-distended. Bowel sounds present.  Skin: No rash.  Ext:  no edema    ============================I/O===========================   I&O's Detail    04 Oct 2021 07:01  -  05 Oct 2021 07:00  --------------------------------------------------------  IN:    Albumin 5%  - 250 mL: 500 mL    Dexmedetomidine: 297.3 mL    Enteral Tube Flush: 120 mL    IV PiggyBack: 666.5 mL    IV PiggyBack: 200 mL    Miscellaneous Tube Feedin mL    sodium chloride 0.9%: 230 mL    Vasopressin: 2 mL  Total IN: 2415.8 mL    OUT:    Drain (mL): 285 mL    Indwelling Catheter - Urethral (mL): 1095 mL    Lidocaine: 0 mL  Total OUT: 1380 mL    Total NET: 1035.8 mL      05 Oct 2021 07:01  -  06 Oct 2021 06:35  --------------------------------------------------------  IN:    Dexmedetomidine: 402.3 mL    Enteral Tube Flush: 850 mL    IV PiggyBack: 300 mL    IV PiggyBack: 366.6 mL    Miscellaneous Tube Feedin mL    PRBCs (Packed Red Blood Cells): 300 mL    PRBCs (Packed Red Blood Cells): 300 mL    sodium chloride 0.9%: 230 mL    Vasopressin: 12 mL  Total IN: 3180.9 mL    OUT:    Drain (mL): 75 mL    Indwelling Catheter - Urethral (mL): 2930 mL  Total OUT: 3005 mL    Total NET: 175.9 mL        ============================ LABS =========================                        10.9   11.59 )-----------( 130      ( 06 Oct 2021 00:26 )             33.7     10-    134<L>  |  102  |  10  ----------------------------<  87  4.0   |  18<L>  |  0.42<L>    Ca    9.8      06 Oct 2021 00:26  Phos  2.7     10-  Mg     1.9     10-    TPro  7.8  /  Alb  4.0  /  TBili  1.6<H>  /  DBili  x   /  AST  18  /  ALT  20  /  AlkPhos  177<H>  10-06    LIVER FUNCTIONS - ( 06 Oct 2021 00:26 )  Alb: 4.0 g/dL / Pro: 7.8 g/dL / ALK PHOS: 177 U/L / ALT: 20 U/L / AST: 18 U/L / GGT: x             ABG - ( 06 Oct 2021 04:50 )  pH, Arterial: 7.35  pH, Blood: x     /  pCO2: 38    /  pO2: 181   / HCO3: 21    / Base Excess: -4.2  /  SaO2: 99.8                ======================Micro/Rad/Cardio=================  Culture: Reviewed   CXR: Reviewed  Echo:Reviewed  ======================================================  PAST MEDICAL & SURGICAL HISTORY:  CHF (congestive heart failure)    CAD (coronary artery disease)    Depression    Pleural effusion    History of 2019 novel coronavirus disease (COVID-19)  2020    Hemorrhoids    Bleeding hemorrhoids    Peripheral arterial disease    Claudication    BPH with urinary obstruction    ACC/AHA stage D systolic heart failure    Anticoagulation goal of INR 2.0 to 2.5    Falls    Clavicle fracture    CKD (chronic kidney disease), stage III    Iron deficiency anemia    H/O epistaxis    Vertigo    GI bleed    S/P TVR (tricuspid valve repair)    S/P ventricular assist device    S/P endoscopy      ====================ASSESSMENT ==============  Stage D Nonischemic Cardiomyopathy, Status Post HM2 on 2017    Cardiogenic shock  Hemodynamic instability   Acute hypoxemic respiratory failure s/p trach , decannulated on ; Reintubated on    GI bleed , Status Post Enteroscopy   Anemia, in setting of melena S/P 2U PRBC   Chronic Kidney Disease  Stress hyperglycemia   C.diff positive on    Hypovolemic shock  Septic shock  Leukocytosis  GB thickening/percholecystic s/p perc choley by IT   SMA stenosis  Serratia/citrobacter pneumonia   Stenotrophomonas pneumonia   Enterobacter pneumonia   Nausea/vomiting  Deconditioning    Plan:  ====================== NEUROLOGY=====================  Sedated with IV Precedex to maintain vent synchrony   Tylenol PRN for analgesia   PT as tolerated   Will call psych to determine if pt has capacity of making medical decisions. If not, will follow up with vascular whether or not to pursue SMA stent.     acetaminophen    Suspension .. 650 milliGRAM(s) Enteral Tube every 6 hours PRN Mild Pain (1 - 3)  dexMEDEtomidine Infusion 1.5 MICROgram(s)/kG/Hr (23.1 mL/Hr) IV Continuous <Continuous>  ==================== RESPIRATORY======================  Acute hypoxemic respiratory failure s/p #8 shiley trach on ; decannulated on ; Reintubated on ; trach size 6 DCT cuff on 10/4, 10/5 Trach 8 betzaida   Continue close monitoring of respiratory rate and breathing pattern, following of ABG’s with A-line monitoring, continuous pulse oximetry monitoring.   CPAP vent weaning as tolerated     Mechanical Ventilation:  Mode: AC/ CMV (Assist Control/ Continuous Mandatory Ventilation)  RR (machine): 12  TV (machine): 500  FiO2: 40  PEEP: 5  ITime: 1  MAP: 12  PIP: 18    ====================CARDIOVASCULAR==================  Stage D Nonischemic Cardiomyopathy, Status Post HM2 on 2017; LVAD settings 9200, flow 6.8   TTE 10/4:  Severely decreased LV systolic function, Diffuse hypokinesis. Mild AR. No pericardial effusion   VT on 10/4, s/p Lidocaine drip, continue close tele monitoring   Pressor support with IV Vasopressin, wean as tolerated  Holding off on Propranolol for now     vasopressin Infusion 0.017 Unit(s)/Min (1 mL/Hr) IV Continuous <Continuous>  ===================HEMATOLOGIC/ONC ===================  CTA A/P on  +L RP hematoma   Acute blood loss anemia, H/H 8.1/26.5, S/P 2U of pRBCs yesterday and monitor CBC   Thrombocytopenia, possible in setting of sepsis, now starting to trend up, 73->103, Rest of labs not consistent with DIC  Holding coumadin and ASA given recurrent GI bleeding, continue monitoring LDH     ===================== RENAL =========================  Hx of CKD, continue monitoring urine output, I&OS, BUN/Cr   Euvolemic, even fluid balance, currently off diuretics.    Replete lytes PRN. Keep K> 4 and Mg >2  C/w Aldactone for diuresis    Continue monitoring urine output, I&OS, BUN/Cr   spironolactone 12.5 milliGRAM(s) Oral every 12 hours  ==================== GASTROINTESTINAL===================  Tolerating TF Vital AF 1.5, @ goal 40 cc/hr   CT A/P 10/5 neg for intra-abd abcess, severe stenosis of prox SMA and b/l iliac arteries  CTA A/P : Evaluation of the mesenteric vessels is limited by streak artifact from LVAD. There appears to be severe stenosis of the proximal SMA; abdominal mesenteric doppler is recommended for further evaluation. 2.  No small bowel findings to suggest acute mesenteric ischemia. 3.  Focal dissections involving the right and left common iliac arteries.  Mesenteric duplex on 8/15 borderline stenosis of proximal SMA, s/p failed SMA stent, L fem RP hematoma on 9/10; No intervention for cholecystostomy tube/PEJ at this time   Reglan for gut motility  PRN Simethicone for gas  Continue Protonix for stress ulcer prophylaxis.     multivitamin 1 Tablet(s) Oral daily  pantoprazole  Injectable 40 milliGRAM(s) IV Push every 12 hours  simethicone 80 milliGRAM(s) Chew every 8 hours PRN Gas  sodium chloride 0.9%. 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       Per endocrine      - c/w Methimazole, adjust based on labs        - Check Free T4 and total T3     insulin lispro (ADMELOG) corrective regimen sliding scale   SubCutaneous every 6 hours  methimazole 20 milliGRAM(s) Oral <User Schedule>  ========================INFECTIOUS DISEASE================  Afebrile, WBC downtrending 14.03--> 11.59  Continue trending WBC and monitoring fever curve   CT C/A/P : progressive ISABELLE opacities and L consolidations, multifocal pneumonia  BCx  + Serratia marcescens, BCx 10/1 +Enterobacter cloacae complex, repeat BCx on 10/3 NGTD, c/w Meropenem   repeat BCx sent today, follow up  Vancomycin solution for C.diff prophylaxis  Will f/u antibiotic regimen with ID    meropenem  IVPB 1000 milliGRAM(s) IV Intermittent every 8 hours  vancomycin    Solution 125 milliGRAM(s) Oral every 12 hours      Patient requires continuous monitoring with bedside rhythm monitoring, pulse ox monitoring, and intermittent blood gas analysis. Care plan discussed with ICU care team. Patient remained critical and at risk for life threatening decompensation.     By signing my name below, IJanina Tiffany, attest that this documentation has been prepared under the direction and in the presence of Jaclyn Mendoza NP  Electronically signed: Emily Roa Scribe, 10-06-21 @ 06:35    Jaclyn STANLEY NP , personally performed the services described in this documentation. all medical record entries made by the romel were at my direction and in my presence. I have reviewed the chart and agree that the record reflects my personal performance and is accurate and complete  Electronically signed: Jaclyn Mendoza NP

## 2021-10-06 NOTE — PROGRESS NOTE ADULT - PROBLEM SELECTOR PLAN 4
- S/p trach 10/4 by ENT, consent given by HCP sister  - Current vent setting: tidal volume 500, RR 16, PEEP 5, FIO2 40%

## 2021-10-06 NOTE — PROGRESS NOTE ADULT - ASSESSMENT
52 y/o POD #2 for tracheostomy 2/2 respiratory failure. Pt is doing well, #6 DCT cuffed in place, on the vent, with minimal serosanguinous oozing from stoma, no active bleed. sutures x4 and umbilical tie in place.

## 2021-10-06 NOTE — PROGRESS NOTE ADULT - SUBJECTIVE AND OBJECTIVE BOX
HEART FAILURE FELLOW PROGRESS NOTE    Subjective:    No acute events overnight.     Tele: NSR with sinus tachycardia    ROS + secretions     Current Medications:   acetaminophen    Suspension .. 650 milliGRAM(s) Enteral Tube every 6 hours PRN  chlorhexidine 0.12% Liquid 15 milliLiter(s) Oral Mucosa two times a day  chlorhexidine 2% Cloths 1 Application(s) Topical <User Schedule>  dexMEDEtomidine Infusion 1.5 MICROgram(s)/kG/Hr IV Continuous <Continuous>  insulin lispro (ADMELOG) corrective regimen sliding scale   SubCutaneous every 6 hours  meropenem  IVPB 1000 milliGRAM(s) IV Intermittent every 8 hours  methimazole 20 milliGRAM(s) Oral <User Schedule>  metoclopramide Injectable 10 milliGRAM(s) IV Push every 8 hours  multivitamin 1 Tablet(s) Oral daily  pantoprazole  Injectable 40 milliGRAM(s) IV Push every 12 hours  simethicone 80 milliGRAM(s) Chew every 8 hours PRN  sodium chloride 0.9%. 1000 milliLiter(s) IV Continuous <Continuous>  spironolactone 12.5 milliGRAM(s) Oral every 12 hours  thiamine 100 milliGRAM(s) Oral daily  vancomycin    Solution 125 milliGRAM(s) Oral every 12 hours  vasopressin Infusion 0.017 Unit(s)/Min IV Continuous <Continuous>    Physical Exam:  T(F): 98.2 (10-06), Max: 98.6 (10-06)  HR: 110 (10-06) (53 - 113)  BP: --  BP(mean): --  ABP: 98/66 (10-06) (69/59 - 163/106)  ABP(mean): 78 (10-06) (62 - 137)  RR: 25 (10-06)  SpO2: 100% (10-06)  GENERAL: No acute distress, well-developed, lethargic  HEAD:  Atraumatic, Normocephalic  ENT: EOMI, PERRLA, conjunctiva and sclera clear. Trach w/ mechanical ventilation.  CHEST/LUNG: Decreased breath sounds bilaterally   BACK: No spinal tenderness  HEART: +LVAD hum  ABDOMEN: Mildly distended w/ mild tenderness diffusely. Per dawn drain w/ biliary drainage.  EXTREMITIES:  No clubbing, cyanosis, or edema  PSYCH: Nl behavior, nl affect  NEUROLOGY: Follows simple commands, not vocal on exam today to assess mental status but is able to nod or shake head to questions.   SKIN: Normal color, No rashes or lesions    LVAD Interrogation  VAD TYPE HM 2  Speed 9200  Flow 4.9  Power 5.4  PI  6.8  Assessment of driveline exit site: driveline dressing c/d/i  Programming changes: no changes made      Cardiovascular Diagnostic Testing: personally reviewed    CXR: Personally reviewed    Labs: Personally reviewed                        10.9   11.59 )-----------( 130      ( 06 Oct 2021 00:26 )             33.7     10-06    134<L>  |  102  |  10  ----------------------------<  87  4.0   |  18<L>  |  0.42<L>    Ca    9.8      06 Oct 2021 00:26  Phos  2.7     10-06  Mg     1.9     10-06    TPro  7.8  /  Alb  4.0  /  TBili  1.6<H>  /  DBili  x   /  AST  18  /  ALT  20  /  AlkPhos  177<H>  10-06

## 2021-10-06 NOTE — PROGRESS NOTE ADULT - ASSESSMENT
67 YO M with a history of stage D NICM s/p HM2 on 9/2017 as DT (due to severe PAD) with TV ring, prior COVID-19 infection 4/2020, recurrent syncope post LVAD s/p ILR, and chronic abdominal pain with prior negative workup who was admitted 6/14/21 with symptomatic anemia with Hgb 4.5 in setting of INR 8.8 without hemodynamic instability and has since had a protracted hospitalization. He was transfused and underwent VCE which showed active bleeding in the mid small bowel but subsequent enteroscopy 6/15 did not reveal any active bleeding. He acutely decompensated after procedure with fever/hypertension, low flow alarms, and pulmonary infiltrate with hypoxia requiring intubation from probable aspiration PNA. He was unable to extubated and has since undergone tracheostomy but tolerating persistent trach collar and nearing ability for decannulation. 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 He underwent angiogram on 9/10, stent was unable to be placed and course was then complicated by RP bleed. He continued to have persistent leukocytosis and febrile episodes and was noted to have positive sputum culture for serratia marcescens and completed his course of abx. He was initially decannulated on 9/23. Recently he started having multiples of melena, emesis and went into acte respiratory distress and was ultimately re-intubated on 9/26.     His most recent blood cultures are positive for enterobacter cloacae and remains on IV antibiotics. Pressors have been weaned off and MAPs are at goal (70-80). Is schedule to undergo trach today with ENT. Overall prognosis remains guarded. Will need to discuss GOC with patient and family.

## 2021-10-06 NOTE — BH CONSULTATION LIAISON PROGRESS NOTE - NSBHMSESPABN_PSY_A_CORE
Soft volume/Slowed rate/Decreased productivity/Impaired articulation
Soft volume/Slowed rate/Decreased productivity/Impaired articulation

## 2021-10-06 NOTE — BH CONSULTATION LIAISON PROGRESS NOTE - CURRENT MEDICATION
MEDICATIONS  (STANDING):  cefepime   IVPB      cefepime   IVPB 1000 milliGRAM(s) IV Intermittent every 8 hours  chlorhexidine 0.12% Liquid 15 milliLiter(s) Oral Mucosa every 12 hours  chlorhexidine 2% Cloths 1 Application(s) Topical <User Schedule>  clonazePAM  Tablet 0.25 milliGRAM(s) Oral at bedtime  dextrose 5% + sodium chloride 0.45%. 1000 milliLiter(s) (50 mL/Hr) IV Continuous <Continuous>  heparin   Injectable 5000 Unit(s) SubCutaneous every 8 hours  insulin lispro (ADMELOG) corrective regimen sliding scale   SubCutaneous every 6 hours  methimazole 10 milliGRAM(s) Oral every 8 hours  metoclopramide Injectable 10 milliGRAM(s) IV Push every 8 hours  mirtazapine 7.5 milliGRAM(s) Oral daily  multivitamin 1 Tablet(s) Oral daily  pantoprazole  Injectable 40 milliGRAM(s) IV Push every 12 hours  propranolol 20 milliGRAM(s) Oral every 8 hours  thiamine 100 milliGRAM(s) Oral daily  vancomycin    Solution 125 milliGRAM(s) Oral every 6 hours    MEDICATIONS  (PRN):  acetaminophen    Suspension .. 650 milliGRAM(s) Oral every 6 hours PRN Mild Pain (1 - 3)  ondansetron Injectable 4 milliGRAM(s) IV Push every 6 hours PRN Nausea and/or Vomiting  
MEDICATIONS  (STANDING):  chlorhexidine 0.12% Liquid 15 milliLiter(s) Oral Mucosa two times a day  chlorhexidine 2% Cloths 1 Application(s) Topical <User Schedule>  dexMEDEtomidine Infusion 1.5 MICROgram(s)/kG/Hr (23.1 mL/Hr) IV Continuous <Continuous>  insulin lispro (ADMELOG) corrective regimen sliding scale   SubCutaneous every 6 hours  meropenem  IVPB 1000 milliGRAM(s) IV Intermittent every 8 hours  methimazole 20 milliGRAM(s) Oral <User Schedule>  metoclopramide Injectable 10 milliGRAM(s) IV Push every 8 hours  multivitamin 1 Tablet(s) Oral daily  pantoprazole  Injectable 40 milliGRAM(s) IV Push every 12 hours  sodium chloride 0.9%. 1000 milliLiter(s) (10 mL/Hr) IV Continuous <Continuous>  spironolactone 12.5 milliGRAM(s) Oral every 12 hours  thiamine 100 milliGRAM(s) Oral daily  vancomycin    Solution 125 milliGRAM(s) Oral every 12 hours  vasopressin Infusion 0.017 Unit(s)/Min (1 mL/Hr) IV Continuous <Continuous>    MEDICATIONS  (PRN):  acetaminophen    Suspension .. 650 milliGRAM(s) Enteral Tube every 6 hours PRN Mild Pain (1 - 3)  simethicone 80 milliGRAM(s) Chew every 8 hours PRN Gas

## 2021-10-06 NOTE — PROGRESS NOTE ADULT - SUBJECTIVE AND OBJECTIVE BOX
RICKY HAMPTON  MRN-[id number]  Patient is a 66y old  Male who presents with a chief complaint of Anemia, Supratherapeutic INR, Dark Stools (06 Oct 2021 17:59)    HPI:  65M PMH ACC/AHA stage D HF due to NICM HM2 LVAD , TV annuloplasty ring 17 as destination therapy due to severe peripheral artery disease with significant stenosis  SIADH, Depression, CKD-3 with hyperkalemia, past E. coli UTIs, driveline drainage (21) and COVID-19 (back in 2020)  He was recently seen in clinic where he complained of abdominal pain and dark stools w constipation back in May. He presents to [facility] ER today weakness and fatigue, moderate and + Black stools for three days, on coumadin secondary to warfarin use in the setting of an LVAD. Patient has required transfusions for GIB in the past. Mostly recently back in 2021 pt had anemia with dark stools. No interventions was done at that time. However Last Endoscopy was done in 2020 (negative). Today labs show patient is anemic with H/H of 4.5/16.3,. INR is 8.84 MAP in the 90s, Temp 35.1. He denies any chest pain, shortness of breath, dizziness, abd pain, nausea or vomiting.       (2021 16:57)      Surgery/Hospital course:   admit for melena w/ anemia, INR 8.84   6/15 Capsul study (+) for small bowel bleed, balloon endoscopy (old blood in prox ileum); post EGD - septic w/ L opacity, re-intubated for concern for aspiration, TTE (Mod MR, decrease biV w/ interventricular septum boweing towards R)   bronch    +C Diff    CT C/A/P: Fluid filled colon which may be 2/2 rapid transit. Small bilateral pleffs with associates. Compressive atelectasis New ISABELLE & LLL  parenchymal opacities, suspicious for pneumonia. Moderate stenosis in the proximal superior mesenteric artery.    #8 Shiley trach at bedside    LVAD speed increased to 9200   Bronch   TC since . Patient transferred to SDU.    INR today 2.64.  H&H 7.3/24 this AM.  Will repeat CBC at noon, and will send stool guaiac Patient with persistent abdominal tenderness, rate of tube feeds decreased.  No nausea/vomiting.     INR today 2.4. H&H 9.1/28.6 low flow overnight /N&V, refusing Tube feeds on D5 1/2 normal  @50 cc/hr   INR 2.69  H&H 7.7/.1 refusing Tube feeds on D5 1/2 normal  @50 cc/hr. This am + BM Melena Dr Oneill HF  aware- PRBC x1  GI team consulted -  NPO  plan on study in am-  D/w Dr Cadet Patient  to return to CTU for further management; 1PRBCS    Post op INR 2.2 today.  No bleeding. BC + for SM.  Pt is hypotensive requiring pressor and inotropic support.  ID follow up today on Cefepime will follow.   R PTC for PTX    CT C/A/P: sub q emphysema in R chest wall, GGO RUL, small ascites CTH negative; Abd US: GB thickening, pericholecystic fluid     Perchole drain in place continues to drain total output overnight 133.  Fever today 38.8    duplex LE negative    Patient with persistent abdominal pain, refusing tube feeds and medications, Psych consulted   CTA A/P ordered to r/o mesenteric ischemia 2/2 persistent anorexia, nausea, vomiting. Revealed:  Evaluation of the mesenteric vessels is limited by streak artifact from LVAD. There appears to be severe stenosis of the proximal SMA; abdominal mesenteric doppler is recommended for further evaluation. 2.  No small bowel findings to suggest acute mesenteric ischemia. 3.  Focal dissections involving the right and left common iliac arteries.  8/15: Cultures resulted BC 1/2 +GPC in clusters, SC enterobacter; mesenteric duplex: borderline stenosis of proximal SMA  : CT C/A/P noncon: Nondular opacities in R lung apex w/cavitation, abd nl  :  Continue current care, treatment of thyrotoxicosis with medications as per endocrine, d/c ABX as per team.    RUQ sono: Contracted gallbladder with cholecystostomy tube in place.  9/10 failed SMA stent, L fem PSA, s/p 3U PRCB   CTA Abd/Pelvis L RP hematoma    Trach decannulated    intubated fro resp distress for increased WOB, LL pneumonia; CT C/A/P: progressive ISABELLE opacities and L consolidations, multifocal pneumonia    TTE (EF 25%, decreased RV, mod MR)   10/3 VT -> Lidocaine gtt   10/4 Trach size 6 DCT cuff  10/5 CT abd (neg for intra-abd abcess, severe stenosis of prox SMA and b/l iliac arteries)      REVIEW OF SYSTEMS:  Unable to obtain due to patient being intubated    Vital Signs Last 24 Hrs  T(C): 36.9 (06 Oct 2021 16:00), Max: 37 (06 Oct 2021 00:00)  T(F): 98.4 (06 Oct 2021 16:00), Max: 98.6 (06 Oct 2021 00:00)  HR: 118 (06 Oct 2021 18:15) (59 - 138)  BP: --  BP(mean): --  RR: 24 (06 Oct 2021 18:15) (9 - 31)  SpO2: 99% (06 Oct 2021 18:15) (89% - 100%)    ============================I/O===========================   I&O's Detail    05 Oct 2021 07:01  -  06 Oct 2021 07:00  --------------------------------------------------------  IN:    Dexmedetomidine: 413 mL    Enteral Tube Flush: 850 mL    IV PiggyBack: 300 mL    IV PiggyBack: 366.6 mL    Miscellaneous Tube Feedin mL    PRBCs (Packed Red Blood Cells): 300 mL    PRBCs (Packed Red Blood Cells): 300 mL    sodium chloride 0.9%: 240 mL    Vasopressin: 14 mL  Total IN: 3223.6 mL    OUT:    Drain (mL): 215 mL    Indwelling Catheter - Urethral (mL): 3005 mL  Total OUT: 3220 mL    Total NET: 3.6 mL      06 Oct 2021 07:01  -  06 Oct 2021 19:20  --------------------------------------------------------  IN:    Enteral Tube Flush: 100 mL    IV PiggyBack: 200 mL    Miscellaneous Tube Feedin mL    sodium chloride 0.9%: 100 mL    Vasopressin: 12 mL  Total IN: 562 mL    OUT:    Dexmedetomidine: 0 mL    Drain (mL): 150 mL    Emesis (mL): 700 mL    Indwelling Catheter - Urethral (mL): 435 mL  Total OUT: 1285 mL    Total NET: -723 mL        ============================ LABS =========================                        10.9   11.59 )-----------( 130      ( 06 Oct 2021 00:26 )             33.7     10-06    134<L>  |  102  |  10  ----------------------------<  87  4.0   |  18<L>  |  0.42<L>    Ca    9.8      06 Oct 2021 00:26  Phos  2.7     10-  Mg     1.9     10-    TPro  7.8  /  Alb  4.0  /  TBili  1.6<H>  /  DBili  x   /  AST  18  /  ALT  20  /  AlkPhos  177<H>  10-06    LIVER FUNCTIONS - ( 06 Oct 2021 00:26 )  Alb: 4.0 g/dL / Pro: 7.8 g/dL / ALK PHOS: 177 U/L / ALT: 20 U/L / AST: 18 U/L / GGT: x             ABG - ( 06 Oct 2021 12:44 )  pH, Arterial: 7.31  pH, Blood: x     /  pCO2: 43    /  pO2: 179   / HCO3: 22    / Base Excess: -4.4  /  SaO2: 100.0               ======================Microbiology/Radiology=================  Culture: Reviewed   CXR: Reviewed  ======================================================  PAST MEDICAL & SURGICAL HISTORY:  CHF (congestive heart failure)    CAD (coronary artery disease)    Depression    Pleural effusion    History of  novel coronavirus disease (COVID-19)  2020    Hemorrhoids    Bleeding hemorrhoids    Peripheral arterial disease    Claudication    BPH with urinary obstruction    ACC/AHA stage D systolic heart failure    Anticoagulation goal of INR 2.0 to 2.5    Falls    Clavicle fracture    CKD (chronic kidney disease), stage III    Iron deficiency anemia    H/O epistaxis    Vertigo    GI bleed    S/P TVR (tricuspid valve repair)    S/P ventricular assist device    S/P endoscopy      ====================ASSESSMENT ==============  Stage D Nonischemic Cardiomyopathy, Status Post HM2 on 2017    Cardiogenic shock  Hemodynamic instability   Acute hypoxemic respiratory failure s/p trach , decannulated on ; Reintubated on    GI bleed , Status Post Enteroscopy   Anemia, in setting of melena S/P 2U PRBC   Chronic Kidney Disease  Stress hyperglycemia   C.diff positive on    Hypovolemic shock  Septic shock  Leukocytosis  GB thickening/percholecystic s/p perc choley by IT   SMA stenosis  Serratia/citrobacter pneumonia   Stenotrophomonas pneumonia   Enterobacter pneumonia   Nausea/vomiting  Deconditioning    Plan:  ====================== NEUROLOGY=====================  Sedated with IV Precedex to maintain vent synchrony   Tylenol PRN for analgesia   PT as tolerated   Will call psych to determine if pt has capacity of making medical decisions. If not, will follow up with vascular whether or not to pursue SMA stent.     acetaminophen    Suspension .. 650 milliGRAM(s) Enteral Tube every 6 hours PRN Mild Pain (1 - 3)  dexMEDEtomidine Infusion 1.5 MICROgram(s)/kG/Hr (23.1 mL/Hr) IV Continuous <Continuous>    ==================== RESPIRATORY======================  Acute hypoxemic respiratory failure s/p #8 shijett trach on ; decannulated on ; Reintubated on ; trach size 6 DCT cuff on 10/4, 10/5 Trach 8 shijett   Continue close monitoring of respiratory rate and breathing pattern, following of ABG’s with A-line monitoring, continuous pulse oximetry monitoring.   CPAP vent weaning as tolerated     Mechanical Ventilation:  Mode: CPAP with PS  FiO2: 40  PEEP: 5  PS: 10  MAP: 11  PIP: 20      ====================CARDIOVASCULAR==================  Stage D Nonischemic Cardiomyopathy, Status Post HM2 on 2017; LVAD settings 9200, flow 5.6  TTE 10/4:  Severely decreased LV systolic function, Diffuse hypokinesis. Mild AR. No pericardial effusion   VT on 10/4, s/p Lidocaine drip, continue close tele monitoring   Pressor support with IV Vasopressin, wean as tolerated  Holding off on Propranolol for now     vasopressin Infusion 0.017 Unit(s)/Min (1 mL/Hr) IV Continuous <Continuous>    ===================HEMATOLOGIC/ONC ===================  CTA A/P on  +L RP hematoma   Acute blood loss anemia, H/H 10.9/33.7, S/P 2U of pRBCs yesterday and monitor CBC   Thrombocytopenia, possible in setting of sepsis, now starting to trend up, 73->103->130, Rest of labs not consistent with DIC  Holding coumadin and ASA given recurrent GI bleeding, continue monitoring LDH   Monitor H&H/Plts     ===================== RENAL =========================  Hx of CKD, continue monitoring urine output, I&OS, BUN/Cr   Euvolemic, even fluid balance, currently off diuretics.    Replete lytes PRN. Keep K> 4 and Mg >2  C/w Aldactone for diuresis    spironolactone 12.5 milliGRAM(s) Oral every 12 hours    ==================== GASTROINTESTINAL===================  Tolerating TF Vital AF 1.5, @ goal 40 cc/hr   CT A/P 10/5 neg for intra-abd abcess, severe stenosis of prox SMA and b/l iliac arteries  CTA A/P : Evaluation of the mesenteric vessels is limited by streak artifact from LVAD. There appears to be severe stenosis of the proximal SMA; abdominal mesenteric doppler is recommended for further evaluation. 2.  No small bowel findings to suggest acute mesenteric ischemia. 3.  Focal dissections involving the right and left common iliac arteries.  Mesenteric duplex on 8/15 borderline stenosis of proximal SMA, s/p failed SMA stent, L fem RP hematoma on 9/10; No intervention for cholecystostomy tube/PEJ at this time   Reglan for gut motility  PRN Simethicone for gas  Continue Protonix for stress ulcer prophylaxis.     metoclopramide Injectable 10 milliGRAM(s) IV Push every 8 hours  multivitamin 1 Tablet(s) Oral daily  pantoprazole  Injectable 40 milliGRAM(s) IV Push every 12 hours  simethicone 80 milliGRAM(s) Chew every 8 hours PRN Gas  sodium chloride 0.9%. 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       Per endocrine      - c/w Methimazole, adjust based on labs        - Check Free T4 and total T3     insulin lispro (ADMELOG) corrective regimen sliding scale   SubCutaneous every 6 hours  methimazole 20 milliGRAM(s) Oral <User Schedule>    ========================INFECTIOUS DISEASE================  Afebrile, WBC downtrending 14.03--> 11.59  Continue trending WBC and monitoring fever curve   CT C/A/P : progressive ISABELLE opacities and L consolidations, multifocal pneumonia  BCx  + Serratia marcescens, BCx 10/1 +Enterobacter cloacae complex, repeat BCx on 10/3 NGTD, c/w Meropenem   repeat BCx sent today, follow up  Vancomycin solution for C.diff prophylaxis  Will f/u antibiotic regimen with ID    meropenem  IVPB 1000 milliGRAM(s) IV Intermittent every 8 hours  vancomycin    Solution 125 milliGRAM(s) Oral every 12 hours        Patient requires continuous monitoring with bedside rhythm monitoring, pulse ox monitoring, and intermittent blood gas analysis. Care plan discussed with ICU care team. Patient remained critical and at risk for life threatening decompensation.    By signing my name below, I, Daniela Chowdary, attest that this documentation has been prepared under the direction and in the presence of Jus Jensen MD   Electronically signed: Marisel Sureshibe    IJus, personally performed the services described in this documentation. all medical record entries made by the scribe were at my direction and in my presence. I have reviewed the chart and agree that the record reflects my personal performance and is accurate and complete  Electronically signed: Jus Jensen MD 10-06-21 @ 19:20

## 2021-10-06 NOTE — BH CONSULTATION LIAISON PROGRESS NOTE - NSBHMSESPEECH_PSY_A_CORE
Patient's NG tube limits the patient's volume, but speech is otherwise within normal limits./Abnormal as indicated, otherwise normal...
Patient's NG tube limits the patient's volume, but speech is otherwise within normal limits./Abnormal as indicated, otherwise normal...

## 2021-10-06 NOTE — PROGRESS NOTE ADULT - SUBJECTIVE AND OBJECTIVE BOX
INFECTIOUS DISEASES FOLLOW UP-- Anitra Prater  [phone]    This is a follow up note for this  66yMale with  Anemia    Acute cholecystitis        ROS:  CONSTITUTIONAL:  No fever, good appetite  CARDIOVASCULAR:  No chest pain or palpitations  RESPIRATORY:  No dyspnea  GASTROINTESTINAL:  No nausea, vomiting, diarrhea, or abdominal pain  GENITOURINARY:  No dysuria  NEUROLOGIC:  No headache,     Allergies    Amiodarone Hydrochloride (Other (Severe))    Intolerances        ANTIBIOTICS/RELEVANT:  antimicrobials  meropenem  IVPB 1000 milliGRAM(s) IV Intermittent every 8 hours  vancomycin    Solution 125 milliGRAM(s) Oral every 12 hours    immunologic:    OTHER:  acetaminophen    Suspension .. 650 milliGRAM(s) Enteral Tube every 6 hours PRN  chlorhexidine 0.12% Liquid 15 milliLiter(s) Oral Mucosa two times a day  chlorhexidine 2% Cloths 1 Application(s) Topical <User Schedule>  dexMEDEtomidine Infusion 1.5 MICROgram(s)/kG/Hr IV Continuous <Continuous>  insulin lispro (ADMELOG) corrective regimen sliding scale   SubCutaneous every 6 hours  methimazole 20 milliGRAM(s) Oral <User Schedule>  metoclopramide Injectable 10 milliGRAM(s) IV Push every 8 hours  multivitamin 1 Tablet(s) Oral daily  pantoprazole  Injectable 40 milliGRAM(s) IV Push every 12 hours  simethicone 80 milliGRAM(s) Chew every 8 hours PRN  sodium chloride 0.9%. 1000 milliLiter(s) IV Continuous <Continuous>  spironolactone 12.5 milliGRAM(s) Oral every 12 hours  thiamine 100 milliGRAM(s) Oral daily  vasopressin Infusion 0.017 Unit(s)/Min IV Continuous <Continuous>      Objective:  Vital Signs Last 24 Hrs  T(C): 36.9 (06 Oct 2021 16:00), Max: 37 (06 Oct 2021 00:00)  T(F): 98.4 (06 Oct 2021 16:00), Max: 98.6 (06 Oct 2021 00:00)  HR: 116 (06 Oct 2021 17:30) (59 - 138)  BP: --  BP(mean): --  RR: 24 (06 Oct 2021 17:30) (9 - 31)  SpO2: 99% (06 Oct 2021 17:30) (89% - 100%)    PHYSICAL EXAM:  Constitutional:no acute distress  Eyes:ALONSO, EOMI  Ear/Nose/Throat: no oral lesions, 	  Respiratory: clear BL  Cardiovascular: S1S2  Gastrointestinal:soft, (+) BS, no tenderness  Extremities:no e/e/c  No Lymphadenopathy  IV sites not inflammed.    LABS:                        10.9   11.59 )-----------( 130      ( 06 Oct 2021 00:26 )             33.7     10-06    134<L>  |  102  |  10  ----------------------------<  87  4.0   |  18<L>  |  0.42<L>    Ca    9.8      06 Oct 2021 00:26  Phos  2.7     10-06  Mg     1.9     10-06    TPro  7.8  /  Alb  4.0  /  TBili  1.6<H>  /  DBili  x   /  AST  18  /  ALT  20  /  AlkPhos  177<H>  10-06          MICROBIOLOGY:            RECENT CULTURES:  10-05 @ 14:55  .Blood Blood-Peripheral  --  --  --    No growth to date.  --  10-03 @ 21:21  .Blood Blood  --  --  --    No growth to date.  --  10-01 @ 05:42  .Blood Blood-Peripheral  --  --  --    No Growth Final  --  10-01 @ 05:41  .Blood Blood-Peripheral  Enterobacter cloacae complex  Enterobacter cloacae complex  CLAU    Growth in anaerobic bottle: Enterobacter cloacae complex  --  09-30 @ 16:56  .Blood Blood-Peripheral  Blood Culture PCR  Serratia marcescens  Blood Culture PCR  PCR    Growth in aerobic and anaerobic bottles: Serratia marcescens  See previous culture [id number]  --      RADIOLOGY & ADDITIONAL STUDIES: INFECTIOUS DISEASES FOLLOW UP-- Anitra Prater  [phone]    This is a follow up note for this  66yMale with  Anemia    Acute cholecystitis        ROS:  CONSTITUTIONAL:  awake, alert    Allergies    Amiodarone Hydrochloride (Other (Severe))    Intolerances        ANTIBIOTICS/RELEVANT:  antimicrobials  meropenem  IVPB 1000 milliGRAM(s) IV Intermittent every 8 hours  vancomycin    Solution 125 milliGRAM(s) Oral every 12 hours    immunologic:    OTHER:  acetaminophen    Suspension .. 650 milliGRAM(s) Enteral Tube every 6 hours PRN  chlorhexidine 0.12% Liquid 15 milliLiter(s) Oral Mucosa two times a day  chlorhexidine 2% Cloths 1 Application(s) Topical <User Schedule>  dexMEDEtomidine Infusion 1.5 MICROgram(s)/kG/Hr IV Continuous <Continuous>  insulin lispro (ADMELOG) corrective regimen sliding scale   SubCutaneous every 6 hours  methimazole 20 milliGRAM(s) Oral <User Schedule>  metoclopramide Injectable 10 milliGRAM(s) IV Push every 8 hours  multivitamin 1 Tablet(s) Oral daily  pantoprazole  Injectable 40 milliGRAM(s) IV Push every 12 hours  simethicone 80 milliGRAM(s) Chew every 8 hours PRN  sodium chloride 0.9%. 1000 milliLiter(s) IV Continuous <Continuous>  spironolactone 12.5 milliGRAM(s) Oral every 12 hours  thiamine 100 milliGRAM(s) Oral daily  vasopressin Infusion 0.017 Unit(s)/Min IV Continuous <Continuous>      Objective:  Vital Signs Last 24 Hrs  T(C): 36.9 (06 Oct 2021 16:00), Max: 37 (06 Oct 2021 00:00)  T(F): 98.4 (06 Oct 2021 16:00), Max: 98.6 (06 Oct 2021 00:00)  HR: 116 (06 Oct 2021 17:30) (59 - 138)  BP: --  BP(mean): --  RR: 24 (06 Oct 2021 17:30) (9 - 31)  SpO2: 99% (06 Oct 2021 17:30) (89% - 100%)    PHYSICAL EXAM:  Constitutional:no acute distress but with significant vomiting  Eyes:ALONSO, EOMI  Ear/Nose/Throat: no oral lesions, trach site okay	  Respiratory: clear BL  Cardiovascular: [id number] sounds  Gastrointestinal: RUQ cholecystotomy tube, c/o pain  Extremities:no e/e/c  No Lymphadenopathy  IV sites not inflammed.    LABS:                        10.9   11.59 )-----------( 130      ( 06 Oct 2021 00:26 )             33.7     10-06    134<L>  |  102  |  10  ----------------------------<  87  4.0   |  18<L>  |  0.42<L>    Ca    9.8      06 Oct 2021 00:26  Phos  2.7     10-06  Mg     1.9     10-06    TPro  7.8  /  Alb  4.0  /  TBili  1.6<H>  /  DBili  x   /  AST  18  /  ALT  20  /  AlkPhos  177<H>  10-06          MICROBIOLOGY:            RECENT CULTURES:  10-05 @ 14:55  .Blood Blood-Peripheral  --  --  --    No growth to date.  --  10-03 @ 21:21  .Blood Blood  --  --  --    No growth to date.  --  10-01 @ 05:42  .Blood Blood-Peripheral  --  --  --    No Growth Final  --  10-01 @ 05:41  .Blood Blood-Peripheral  Enterobacter cloacae complex  Enterobacter cloacae complex  CLAU    Growth in anaerobic bottle: Enterobacter cloacae complex  --  09-30 @ 16:56  .Blood Blood-Peripheral  Blood Culture PCR  Serratia marcescens  Blood Culture PCR  PCR    Growth in aerobic and anaerobic bottles: Serratia marcescens  See previous culture [id number]  --      RADIOLOGY & ADDITIONAL STUDIES:    < from: Xray Chest 1 View- PORTABLE-Routine (Xray Chest 1 View- PORTABLE-Routine in AM.) (10.06.21 @ 02:08) >  IMPRESSION:    The heart is normal in size. The lungs are clear. No pleural effusion. No pneumothorax. All life supporting devices are in good position and unchanged when compared to previous study done October 4, 2021 at 3:52 PM.    < end of copied text >

## 2021-10-06 NOTE — BH CONSULTATION LIAISON PROGRESS NOTE - NSBHCHARTREVIEWVS_PSY_A_CORE FT
Vital Signs Last 24 Hrs  T(C): 37 (06 Oct 2021 12:45), Max: 37 (06 Oct 2021 00:00)  T(F): 98.6 (06 Oct 2021 12:45), Max: 98.6 (06 Oct 2021 00:00)  HR: 125 (06 Oct 2021 15:45) (59 - 138)  BP: --  BP(mean): --  RR: 24 (06 Oct 2021 15:45) (9 - 31)  SpO2: 100% (06 Oct 2021 15:45) (89% - 100%)
Vital Signs Last 24 Hrs  T(C): 37.2 (19 Aug 2021 12:00), Max: 37.3 (18 Aug 2021 20:00)  T(F): 98.9 (19 Aug 2021 12:00), Max: 99.1 (18 Aug 2021 20:00)  HR: 114 (19 Aug 2021 13:00) (90 - 114)  BP: --  BP(mean): --  RR: 38 (19 Aug 2021 13:00) (12 - 38)  SpO2: 97% (19 Aug 2021 13:00) (95% - 100%)

## 2021-10-06 NOTE — PROGRESS NOTE ADULT - PROBLEM SELECTOR PLAN 3
- Vascular surgery following, may reattempt via subclavian approach  - To consult psychiatry today for capacity evaluation, if deemed to have capacity and the patient continues to refuse, to undergo ethics discussion

## 2021-10-06 NOTE — BH CONSULTATION LIAISON PROGRESS NOTE - NSBHCONSULTRECOMMENDOTHER_PSY_A_CORE FT
pt does not have capacity to refuse stents at this time, pt does not understand risks/benefits, defer to hcp 
cont remeron, klonopin for now. if mental status worsens, may consider d/c klonopin

## 2021-10-06 NOTE — PROGRESS NOTE ADULT - ATTENDING COMMENTS
Overall, a very challenging hospital course for Mr. Quispe, who is well known to our service. Active issues currently being addressed include:    1. Respiratory failure - s/p trach. Vent weaning, tolerating some trach trials.  2. Bacteremia/sepsis - No obvious source on abdominal CT. GB drain is in place and functional. Antibiotics per ID.  3. SMA stenosis - Vascular can reattempt via subclavian approach, but patient currently refuses.   4. Stage D HF s/p LVAD - stable without evidence of device malfunction. MAP at goal, off pressors. Unable to tolerate AC or ECASA due to recurrent GI bleeding. LDH stable.    Overall prognosis is poor given recurrent bleeding and infection, but he is on appropriate therapies at the moment. He is frail and cachectic and will certainly need rehab should we be able to advance his diet at any point. He was adamant in the past about not receiving a surgical PEG or PEJ. Appreciate input from Ethics and Psychiatry. Need to have Palliative Care revisit his GOC.

## 2021-10-07 NOTE — PROCEDURE NOTE - NSCVLACTUALSITE_VASC_A_CORE
"""Patient informed of anatomically narrow angles.  Recommend peripheral iridotomy OD
"""Will reevaluate cataracts after YAP PI OU has been done. """
left/internal jugular
left/internal jugular
right/internal jugular
right/subclavian vein
right/femoral vein
left/internal jugular
right/internal jugular
right/internal jugular
left/internal jugular
right/subclavian vein
right/internal jugular

## 2021-10-07 NOTE — PROGRESS NOTE ADULT - ASSESSMENT
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 coli UTIs, driveline drainage (1/7/21) and COVID-19, here initially for GIB prolonged hospital course, s/p tracheostomy, acalculous cholecystitis s/p perc dawn. Patient has persistent intermittent abdominal pain, CTA showing possible proximal SMA stenosis. CTA poor quality limited by significant streak artifact. Mesenteric duplex velocities without evidence of significant stenosis, however study unreliable in the setting of patient's augmented systolic function given LVAD. Now s/p diagnostic mesenteric angiogram with unsuccessful SMA stenting. 09/12 CTA obtained due to downtrending H/H with evidence of L RP hematoma without evidence of active extravasation, L groin wound stable. H/H stable. Vascular re-consulted for potential attempt at second intervention given improved patient condition    PLAN:  - Goals of care remain unclear per psych and ethics, awaiting to be seen by palliative care for discussion of further intervention    Vascular Surgery  [id number]

## 2021-10-07 NOTE — PROCEDURE NOTE - NSNUMOFLUMENS_VASC_A_CORE
triple lumen
double lumen
triple lumen
single lumen
triple lumen
double lumen
triple lumen
triple lumen

## 2021-10-07 NOTE — PROGRESS NOTE ADULT - ASSESSMENT
52 y/o POD #3 for tracheostomy 2/2 respiratory failure. Pt is doing well, #6 DCT cuffed in place, on the vent, with minimal serosanguinous oozing from stoma, no active bleed. sutures x4 and umbilical tie in place.

## 2021-10-07 NOTE — PROCEDURE NOTE - NSPROCDETAILS_GEN_ALL_CORE
Seldinger technique/dressing applied/secured in place/sterile dressing applied/supine position
guidewire recovered/lumen(s) aspirated and flushed/sterile dressing applied/sterile technique, catheter placed/ultrasound guidance with use of sterile gel and probe cove
location identified, draped/prepped, sterile technique used, needle inserted/introduced/positive blood return obtained via catheter/connected to a pressurized flush line/sutured in place/hemostasis with direct pressure, dressing applied/Seldinger technique/all materials/supplies accounted for at end of procedure
guidewire recovered/lumen(s) aspirated and flushed/sterile dressing applied/sterile technique, catheter placed/ultrasound guidance with use of sterile gel and probe cove
guidewire recovered/lumen(s) aspirated and flushed/sterile dressing applied/sterile technique, catheter placed/ultrasound guidance with use of sterile gel and probe cove
location identified, draped/prepped, sterile technique used, needle inserted/introduced/positive blood return obtained via catheter/connected to a pressurized flush line/sutured in place/hemostasis with direct pressure, dressing applied/Seldinger technique/all materials/supplies accounted for at end of procedure
location identified, draped/prepped, sterile technique used, needle inserted/introduced/positive blood return obtained via catheter/connected to a pressurized flush line/sutured in place/Seldinger technique/all materials/supplies accounted for at end of procedure
guidewire recovered/lumen(s) aspirated and flushed/sterile dressing applied
location identified, draped/prepped, sterile technique used/sterile dressing applied/sterile technique, catheter placed/ultrasound guidance
guidewire recovered/lumen(s) aspirated and flushed/sterile dressing applied/sterile technique, catheter placed/ultrasound guidance with use of sterile gel and probe cove
location identified, draped/prepped, sterile technique used, needle inserted/introduced/positive blood return obtained via catheter/connected to a pressurized flush line/sutured in place/hemostasis with direct pressure, dressing applied/all materials/supplies accounted for at end of procedure
patient pre-oxygenated, tube inserted, placement confirmed
location identified, draped/prepped, sterile technique used, needle inserted/introduced
location identified, draped/prepped, sterile technique used, needle inserted/introduced/positive blood return obtained via catheter/connected to a pressurized flush line/sutured in place/hemostasis with direct pressure, dressing applied/Seldinger technique/all materials/supplies accounted for at end of procedure
sterile technique, indwelling urinary device inserted
location identified, draped/prepped, sterile technique used, needle inserted/introduced/positive blood return obtained via catheter/connected to a pressurized flush line/sutured in place/hemostasis with direct pressure, dressing applied/Seldinger technique/all materials/supplies accounted for at end of procedure
guidewire recovered/lumen(s) aspirated and flushed/sterile dressing applied/sterile technique, catheter placed/ultrasound guidance with use of sterile gel and probe cove
location identified, draped/prepped, sterile technique used, needle inserted/introduced/positive blood return obtained via catheter/connected to a pressurized flush line/sutured in place/hemostasis with direct pressure, dressing applied/Seldinger technique/all materials/supplies accounted for at end of procedure
location identified, draped/prepped, sterile technique used, needle inserted/introduced/positive blood return obtained via catheter/connected to a pressurized flush line/sutured in place/hemostasis with direct pressure, dressing applied/Seldinger technique/all materials/supplies accounted for at end of procedure
guidewire recovered/lumen(s) aspirated and flushed/sterile dressing applied/sterile technique, catheter placed/ultrasound guidance with use of sterile gel and probe cove
location identified, draped/prepped, sterile technique used, needle inserted/introduced/positive blood return obtained via catheter/connected to a pressurized flush line/sutured in place/hemostasis with direct pressure, dressing applied/Seldinger technique/all materials/supplies accounted for at end of procedure
guidewire recovered/lumen(s) aspirated and flushed/sterile dressing applied/sterile technique, catheter placed/ultrasound guidance with use of sterile gel and probe cove
location identified, draped/prepped, sterile technique used, needle inserted/introduced/positive blood return obtained via catheter/connected to a pressurized flush line/sutured in place/hemostasis with direct pressure, dressing applied/Seldinger technique/all materials/supplies accounted for at end of procedure
guidewire recovered/lumen(s) aspirated and flushed/sterile dressing applied/sterile technique, catheter placed/ultrasound guidance with use of sterile gel and probe cove
guidewire recovered/lumen(s) aspirated and flushed/sterile dressing applied/sterile technique, catheter placed/ultrasound guidance with use of sterile gel and probe cove
location identified, draped/prepped, sterile technique used, needle inserted/introduced/positive blood return obtained via catheter/connected to a pressurized flush line/sutured in place/hemostasis with direct pressure, dressing applied/Seldinger technique/all materials/supplies accounted for at end of procedure
location identified, draped/prepped, sterile technique used, needle inserted/introduced/positive blood return obtained via catheter/connected to a pressurized flush line/sutured in place/hemostasis with direct pressure, dressing applied/Seldinger technique/all materials/supplies accounted for at end of procedure

## 2021-10-07 NOTE — PROCEDURE NOTE - NSSITEPREP_SKIN_A_CORE
chlorhexidine
povidone iodine (if allergic to chlorhexidine)
chlorhexidine/Adherence to aseptic technique: hand hygiene prior to donning barriers (gown, gloves), don cap and mask, sterile drape over patient
chlorhexidine
chlorhexidine
chlorhexidine/Adherence to aseptic technique: hand hygiene prior to donning barriers (gown, gloves), don cap and mask, sterile drape over patient
chlorhexidine
chlorhexidine/povidone-iodine ( under 2 weeks of age or 1500 grams)
chlorhexidine
chlorhexidine
chlorhexidine/Adherence to aseptic technique: hand hygiene prior to donning barriers (gown, gloves), don cap and mask, sterile drape over patient
chlorhexidine
povidone iodine (if allergic to chlorhexidine)
chlorhexidine
alcohol
chlorhexidine

## 2021-10-07 NOTE — PROGRESS NOTE ADULT - SUBJECTIVE AND OBJECTIVE BOX
Chief Complaint: Evaluating this 65 y/o M for hyperthyroidism      Interval History: Repeat Free T4 increasing despite higher dose of methimazole 20mg q12. Remains on tube feeds. Hreld shortly today for line change. Pt. had emesis last night. Also with increasing WBC.    MEDICATIONS  (STANDING):  chlorhexidine 0.12% Liquid 15 milliLiter(s) Oral Mucosa two times a day  chlorhexidine 2% Cloths 1 Application(s) Topical <User Schedule>  dexMEDEtomidine Infusion 1.5 MICROgram(s)/kG/Hr (23.1 mL/Hr) IV Continuous <Continuous>  insulin lispro (ADMELOG) corrective regimen sliding scale   SubCutaneous every 6 hours  meropenem  IVPB 1000 milliGRAM(s) IV Intermittent every 8 hours  methimazole 20 milliGRAM(s) Oral every 8 hours  metoclopramide Injectable 10 milliGRAM(s) IV Push every 8 hours  multivitamin 1 Tablet(s) Oral daily  pantoprazole  Injectable 40 milliGRAM(s) IV Push every 12 hours  predniSONE   Tablet 40 milliGRAM(s) Oral daily  propofol Infusion 13.528 MICROgram(s)/kG/Min (5 mL/Hr) IV Continuous <Continuous>  sodium chloride 0.9%. 1000 milliLiter(s) (10 mL/Hr) IV Continuous <Continuous>  spironolactone 12.5 milliGRAM(s) Oral every 12 hours  thiamine 100 milliGRAM(s) Oral daily  vancomycin    Solution 125 milliGRAM(s) Oral every 12 hours  vasopressin Infusion 0.017 Unit(s)/Min (1 mL/Hr) IV Continuous <Continuous>    MEDICATIONS  (PRN):  acetaminophen    Suspension .. 650 milliGRAM(s) Enteral Tube every 6 hours PRN Mild Pain (1 - 3)  fentaNYL    Injectable 12.5 MICROGram(s) IV Push every 3 hours PRN Moderate to severe pain  simethicone 80 milliGRAM(s) Chew every 8 hours PRN Gas      Allergies    Amiodarone Hydrochloride (Other (Severe))    Intolerances      Review of Systems: Unable to obtain fully from the patient at this time    PHYSICAL EXAM:  VITALS: T(C): 37 (10-07-21 @ 08:00)  T(F): 98.6 (10-07-21 @ 08:00), Max: 99.1 (10-07-21 @ 04:00)  HR: 84 (10-07-21 @ 17:45) (80 - 153)  BP: --  RR:  (15 - 42)  SpO2:  (97% - 100%)  Wt(kg): --  GENERAL: NAD at this time  EYES: EOMI, No proptosis  HEENT:  Atraumatic, Normocephalic, +trach  RESPIRATORY: full excursion, non labored  CARDIOVASCULAR: Regular rhythm; normal S1/S2, no peripheral edema  GI: Soft, nontender, non distended, normal bowel sounds  SKIN: Warm and dry  PSYCH: normal affect      POCT Blood Glucose.: 114 mg/dL (10-07-21 @ 12:14)  POCT Blood Glucose.: 88 mg/dL (10-07-21 @ 05:13)  POCT Blood Glucose.: 79 mg/dL (10-06-21 @ 18:04)  POCT Blood Glucose.: 98 mg/dL (10-06-21 @ 04:48)  POCT Blood Glucose.: 109 mg/dL (10-05-21 @ 17:24)  POCT Blood Glucose.: 137 mg/dL (10-05-21 @ 11:49)  POCT Blood Glucose.: 219 mg/dL (10-05-21 @ 06:32)  POCT Blood Glucose.: 108 mg/dL (10-04-21 @ 23:53)  POCT Blood Glucose.: 94 mg/dL (10-04-21 @ 18:47)        10-07    129<L>  |  95<L>  |  17  ----------------------------<  89  3.9   |  18<L>  |  0.42<L>    EGFR if : 142  EGFR if non : 122    Ca    10.3      10-07  Mg     1.9     10-07  Phos  2.4     10-07    TPro  8.4<H>  /  Alb  4.2  /  TBili  1.3<H>  /  DBili  x   /  AST  21  /  ALT  17  /  AlkPhos  191<H>  10-07        Thyroid Function Tests:  10-06 @ 11:21 TSH -- FreeT4 4.2 T3 152 Anti TPO -- Anti Thyroglobulin Ab -- TSI --  10-03 @ 13:29 TSH -- FreeT4 3.2 T3 109 Anti TPO -- Anti Thyroglobulin Ab -- TSI --

## 2021-10-07 NOTE — PROCEDURE NOTE - NSASSISTBY_GEN_A_CORE
Myself
RN/Other
Myself
Mikey/Attending
Myself
Other
Myself

## 2021-10-07 NOTE — PROGRESS NOTE ADULT - PROBLEM SELECTOR PLAN 1
- continue vent per RT/CTU  - suction prn   - continue with trach site care, keep dry and clean   - call ENT with any issues.

## 2021-10-07 NOTE — PROCEDURE NOTE - PROCEDURE DATE TIME, MLM
26-Sep-2021 05:00
14-Jul-2021 16:00
26-Sep-2021 01:00
01-Oct-2021
08-Oct-2021 10:42
26-Jun-2021
15-Sep-2021 00:29
20-Jun-2021
01-Oct-2021
15-Aug-2021 05:00
01-Aug-2021 14:00
03-Jul-2021 04:00
14-Jul-2021 16:43
26-Jul-2021 05:30
08-Oct-2021
26-Sep-2021 02:45
20-Jun-2021 11:00
10-Sep-2021
23-Sep-2021
26-Sep-2021 05:00
09-Jul-2021 12:12
14-Jun-2021 23:18
15-Aug-2021 01:00
15-Sep-2021 00:31
26-Jul-2021 17:17
27-Aug-2021 04:00
26-Jul-2021 19:30
01-Aug-2021 13:00

## 2021-10-07 NOTE — PROGRESS NOTE ADULT - SUBJECTIVE AND OBJECTIVE BOX
INFECTIOUS DISEASES FOLLOW UP-- Anitra Prater  [phone]    This is a follow up note for this  66yMale with  Anemia    Acute cholecystitis        ROS:  CONSTITUTIONAL:  No fever, good appetite  CARDIOVASCULAR:  No chest pain or palpitations  RESPIRATORY:  No dyspnea  GASTROINTESTINAL:  No nausea, vomiting, diarrhea, or abdominal pain  GENITOURINARY:  No dysuria  NEUROLOGIC:  No headache,     Allergies    Amiodarone Hydrochloride (Other (Severe))    Intolerances        ANTIBIOTICS/RELEVANT:  antimicrobials  meropenem  IVPB 1000 milliGRAM(s) IV Intermittent every 8 hours  vancomycin    Solution 125 milliGRAM(s) Oral every 12 hours    immunologic:    OTHER:  acetaminophen    Suspension .. 650 milliGRAM(s) Enteral Tube every 6 hours PRN  chlorhexidine 0.12% Liquid 15 milliLiter(s) Oral Mucosa two times a day  chlorhexidine 2% Cloths 1 Application(s) Topical <User Schedule>  dexMEDEtomidine Infusion 1.5 MICROgram(s)/kG/Hr IV Continuous <Continuous>  fentaNYL    Injectable 12.5 MICROGram(s) IV Push every 3 hours PRN  insulin lispro (ADMELOG) corrective regimen sliding scale   SubCutaneous every 6 hours  methimazole 20 milliGRAM(s) Oral every 8 hours  metoclopramide Injectable 10 milliGRAM(s) IV Push every 8 hours  multivitamin 1 Tablet(s) Oral daily  pantoprazole  Injectable 40 milliGRAM(s) IV Push every 12 hours  predniSONE   Tablet 40 milliGRAM(s) Oral daily  propofol Infusion 13.528 MICROgram(s)/kG/Min IV Continuous <Continuous>  simethicone 80 milliGRAM(s) Chew every 8 hours PRN  sodium chloride 0.9%. 1000 milliLiter(s) IV Continuous <Continuous>  spironolactone 12.5 milliGRAM(s) Oral every 12 hours  thiamine 100 milliGRAM(s) Oral daily  vasopressin Infusion 0.017 Unit(s)/Min IV Continuous <Continuous>      Objective:  Vital Signs Last 24 Hrs  T(C): 36.8 (07 Oct 2021 16:00), Max: 37.3 (07 Oct 2021 04:00)  T(F): 98.2 (07 Oct 2021 16:00), Max: 99.1 (07 Oct 2021 04:00)  HR: 84 (07 Oct 2021 17:45) (80 - 153)  BP: --  BP(mean): --  RR: 21 (07 Oct 2021 17:00) (15 - 42)  SpO2: 100% (07 Oct 2021 17:45) (97% - 100%)    PHYSICAL EXAM:  Constitutional:no acute distress  Eyes:ALONSO, EOMI  Ear/Nose/Throat: no oral lesions, 	  Respiratory: clear BL  Cardiovascular: S1S2  Gastrointestinal:soft, (+) BS, no tenderness  Extremities:no e/e/c  No Lymphadenopathy  IV sites not inflammed.    LABS:                        11.5   15.48 )-----------( 179      ( 07 Oct 2021 00:57 )             35.0     10-07    129<L>  |  95<L>  |  17  ----------------------------<  89  3.9   |  18<L>  |  0.42<L>    Ca    10.3      07 Oct 2021 00:57  Phos  2.4     10-07  Mg     1.9     10-07    TPro  8.4<H>  /  Alb  4.2  /  TBili  1.3<H>  /  DBili  x   /  AST  21  /  ALT  17  /  AlkPhos  191<H>  10-07          MICROBIOLOGY:            RECENT CULTURES:  10-05 @ 14:55  .Blood Blood-Peripheral  --  --  --    No growth to date.  --  10-03 @ 21:21  .Blood Blood  --  --  --    Growth in anaerobic bottle: Gram Negative Rods  --  10-01 @ 05:42  .Blood Blood-Peripheral  --  --  --    No Growth Final  --  10-01 @ 05:41  .Blood Blood-Peripheral  Enterobacter cloacae complex  Enterobacter cloacae complex  CLAU    Growth in anaerobic bottle: Enterobacter cloacae complex  --      RADIOLOGY & ADDITIONAL STUDIES: INFECTIOUS DISEASES FOLLOW UP-- Anitra Prater  [phone]    This is a follow up note for this  66yMale with  Anemia    Acute cholecystitis        ROS:  CONSTITUTIONAL:  c/o nausea/vomiting    Allergies    Amiodarone Hydrochloride (Other (Severe))    Intolerances        ANTIBIOTICS/RELEVANT:  antimicrobials  meropenem  IVPB 1000 milliGRAM(s) IV Intermittent every 8 hours  vancomycin    Solution 125 milliGRAM(s) Oral every 12 hours    immunologic:    OTHER:  acetaminophen    Suspension .. 650 milliGRAM(s) Enteral Tube every 6 hours PRN  chlorhexidine 0.12% Liquid 15 milliLiter(s) Oral Mucosa two times a day  chlorhexidine 2% Cloths 1 Application(s) Topical <User Schedule>  dexMEDEtomidine Infusion 1.5 MICROgram(s)/kG/Hr IV Continuous <Continuous>  fentaNYL    Injectable 12.5 MICROGram(s) IV Push every 3 hours PRN  insulin lispro (ADMELOG) corrective regimen sliding scale   SubCutaneous every 6 hours  methimazole 20 milliGRAM(s) Oral every 8 hours  metoclopramide Injectable 10 milliGRAM(s) IV Push every 8 hours  multivitamin 1 Tablet(s) Oral daily  pantoprazole  Injectable 40 milliGRAM(s) IV Push every 12 hours  predniSONE   Tablet 40 milliGRAM(s) Oral daily  propofol Infusion 13.528 MICROgram(s)/kG/Min IV Continuous <Continuous>  simethicone 80 milliGRAM(s) Chew every 8 hours PRN  sodium chloride 0.9%. 1000 milliLiter(s) IV Continuous <Continuous>  spironolactone 12.5 milliGRAM(s) Oral every 12 hours  thiamine 100 milliGRAM(s) Oral daily  vasopressin Infusion 0.017 Unit(s)/Min IV Continuous <Continuous>      Objective:  Vital Signs Last 24 Hrs  T(C): 36.8 (07 Oct 2021 16:00), Max: 37.3 (07 Oct 2021 04:00)  T(F): 98.2 (07 Oct 2021 16:00), Max: 99.1 (07 Oct 2021 04:00)  HR: 84 (07 Oct 2021 17:45) (80 - 153)  BP: --  BP(mean): --  RR: 21 (07 Oct 2021 17:00) (15 - 42)  SpO2: 100% (07 Oct 2021 17:45) (97% - 100%)    PHYSICAL EXAM:  Constitutional:no acute distress  Eyes:ALONSO, EOMI  Ear/Nose/Throat: trach site intact	  Respiratory: coarse BS bilat  Cardiovascular: VAD sounds  Gastrointestinal:cholecystotomy tube, tender  Extremities:no e/e/c  No Lymphadenopathy  IV sites not inflammed.    LABS:                        11.5   15.48 )-----------( 179      ( 07 Oct 2021 00:57 )             35.0     10-07    129<L>  |  95<L>  |  17  ----------------------------<  89  3.9   |  18<L>  |  0.42<L>    Ca    10.3      07 Oct 2021 00:57  Phos  2.4     10-07  Mg     1.9     10-07    TPro  8.4<H>  /  Alb  4.2  /  TBili  1.3<H>  /  DBili  x   /  AST  21  /  ALT  17  /  AlkPhos  191<H>  10-07          MICROBIOLOGY:            RECENT CULTURES:  10-05 @ 14:55  .Blood Blood-Peripheral  --  --  --    No growth to date.  --  10-03 @ 21:21  .Blood Blood  --  --  --    Growth in anaerobic bottle: Gram Negative Rods  --  10-01 @ 05:42  .Blood Blood-Peripheral  --  --  --    No Growth Final  --  10-01 @ 05:41  .Blood Blood-Peripheral  Enterobacter cloacae complex  Enterobacter cloacae complex  CLAU    Growth in anaerobic bottle: Enterobacter cloacae complex  --      RADIOLOGY & ADDITIONAL STUDIES:    < from: Xray Chest 1 View- PORTABLE-Urgent (Xray Chest 1 View- PORTABLE-Urgent .) (10.07.21 @ 12:51) >    IMPRESSION:    Tracheostomy tube, bilateral IJ lines, enteric tube, and LVAD as above.    Left base and left apex are obscured and notevaluated. Visualized lungs are clear.    Trace right pleural effusion.    < end of copied text >

## 2021-10-07 NOTE — PROCEDURE NOTE - NSPOSTCAREGUIDE_GEN_A_CORE
Verbal/written post procedure instructions were given to patient/caregiver/Care for catheter as per unit/ICU protocols
Care for catheter as per unit/ICU protocols
Care for catheter as per unit/ICU protocols
Verbal/written post procedure instructions were given to patient/caregiver/Keep the cast/splint/dressing clean and dry/Care for catheter as per unit/ICU protocols
Verbal/written post procedure instructions were given to patient/caregiver/Care for catheter as per unit/ICU protocols
Verbal/written post procedure instructions were given to patient/caregiver
Verbal/written post procedure instructions were given to patient/caregiver/Care for catheter as per unit/ICU protocols
Verbal/written post procedure instructions were given to patient/caregiver/Care for catheter as per unit/ICU protocols
Verbal/written post procedure instructions were given to patient/caregiver/Instructed patient/caregiver regarding signs and symptoms of infection
Verbal/written post procedure instructions were given to patient/caregiver/Care for catheter as per unit/ICU protocols
Care for catheter as per unit/ICU protocols
Care for catheter as per unit/ICU protocols
Verbal/written post procedure instructions were given to patient/caregiver/Care for catheter as per unit/ICU protocols
Care for catheter as per unit/ICU protocols
Care for catheter as per unit/ICU protocols
Verbal/written post procedure instructions were given to patient/caregiver/Care for catheter as per unit/ICU protocols
Care for catheter as per unit/ICU protocols
Verbal/written post procedure instructions were given to patient/caregiver/Care for catheter as per unit/ICU protocols
Verbal/written post procedure instructions were given to patient/caregiver/Keep the cast/splint/dressing clean and dry/Care for catheter as per unit/ICU protocols
Verbal/written post procedure instructions were given to patient/caregiver/Keep the cast/splint/dressing clean and dry/Care for catheter as per unit/ICU protocols
Care for catheter as per unit/ICU protocols
Verbal/written post procedure instructions were given to patient/caregiver

## 2021-10-07 NOTE — PROCEDURE NOTE - NSINDICATIONS_GEN_A_CORE
critical illness/emergency venous access/hypertonic/irritant infusion/volume resuscitation
arterial puncture to obtain ABG's/blood sampling
emergency venous access/hemodynamic monitoring
arterial puncture to obtain ABG's/blood sampling
arterial puncture to obtain ABG's/blood sampling/monitoring purposes
emergency venous access/hemodynamic monitoring
arterial puncture to obtain ABG's/blood sampling
critical illness
airway protection/respiratory distress
arterial puncture to obtain ABG's
emergency venous access/hemodynamic monitoring
arterial puncture to obtain ABG's/blood sampling
arterial puncture to obtain ABG's/blood sampling
cannulation purposes/critical patient
critical illness/venous access
critical patient
critical illness/emergency venous access/hemodynamic monitoring
critical patient
emergency venous access
arterial puncture to obtain ABG's
emergency venous access/hemodynamic monitoring
venous access
emergency venous access/hemodynamic monitoring
arterial puncture to obtain ABG's/blood sampling
pneumothorax
arterial puncture to obtain ABG's/blood sampling/cannulation purposes/critical patient/monitoring purposes
critical illness/venous access
arterial puncture to obtain ABG's/critical patient/monitoring purposes
critical illness/hemodynamic monitoring
critical illness

## 2021-10-07 NOTE — PROCEDURE NOTE - NSANATOMICLLOCATION_GEN_A_CORE
left
left/axillae
right/radial artery
left/brachial vein
right/radial artery
left
left/axillae
right/axillae
right/axillae
left/axillae
right/axillae
left/axillae
right/axillae
right/axillae
right/radial artery

## 2021-10-07 NOTE — PROGRESS NOTE ADULT - SUBJECTIVE AND OBJECTIVE BOX
ENT ISSUE/POD: POD #3 for trach     HPI: 65 y/o male POD #3 for tracheostomy #6 DCT cuffed in place 2/2 respiratory failure. Pt is doing well on the vent. no reported issues.       PAST MEDICAL & SURGICAL HISTORY:  CHF (congestive heart failure)    CAD (coronary artery disease)    Depression    Pleural effusion    History of 2019 novel coronavirus disease (COVID-19)  april 2020    Hemorrhoids    Bleeding hemorrhoids    Peripheral arterial disease    Claudication    BPH with urinary obstruction    ACC/AHA stage D systolic heart failure    Anticoagulation goal of INR 2.0 to 2.5    Falls    Clavicle fracture    CKD (chronic kidney disease), stage III    Iron deficiency anemia    H/O epistaxis    Vertigo    GI bleed    S/P TVR (tricuspid valve repair)    S/P ventricular assist device    S/P endoscopy      Allergies    Amiodarone Hydrochloride (Other (Severe))    Intolerances      MEDICATIONS  (STANDING):  chlorhexidine 0.12% Liquid 15 milliLiter(s) Oral Mucosa two times a day  chlorhexidine 2% Cloths 1 Application(s) Topical <User Schedule>  dexMEDEtomidine Infusion 1.5 MICROgram(s)/kG/Hr (23.1 mL/Hr) IV Continuous <Continuous>  insulin lispro (ADMELOG) corrective regimen sliding scale   SubCutaneous every 6 hours  meropenem  IVPB 1000 milliGRAM(s) IV Intermittent every 8 hours  methimazole 20 milliGRAM(s) Oral <User Schedule>  metoclopramide Injectable 10 milliGRAM(s) IV Push every 8 hours  multivitamin 1 Tablet(s) Oral daily  pantoprazole  Injectable 40 milliGRAM(s) IV Push every 12 hours  sodium chloride 0.9%. 1000 milliLiter(s) (10 mL/Hr) IV Continuous <Continuous>  spironolactone 12.5 milliGRAM(s) Oral every 12 hours  thiamine 100 milliGRAM(s) Oral daily  vancomycin    Solution 125 milliGRAM(s) Oral every 12 hours  vasopressin Infusion 0.017 Unit(s)/Min (1 mL/Hr) IV Continuous <Continuous>    MEDICATIONS  (PRN):  acetaminophen    Suspension .. 650 milliGRAM(s) Enteral Tube every 6 hours PRN Mild Pain (1 - 3)  simethicone 80 milliGRAM(s) Chew every 8 hours PRN Gas      ROS:   ENT: all negative except as noted in HPI   Pulm: denies SOB, cough, hemoptysis  Neuro: denies numbness/tingling, loss of sensation  Endo: denies heat/cold intolerance, excessive sweating      Vital Signs Last 24 Hrs  T(C): 37.3 (07 Oct 2021 04:00), Max: 37.3 (07 Oct 2021 04:00)  T(F): 99.1 (07 Oct 2021 04:00), Max: 99.1 (07 Oct 2021 04:00)  HR: 118 (07 Oct 2021 06:45) (72 - 153)  BP: --  BP(mean): --  RR: 19 (07 Oct 2021 06:45) (16 - 38)  SpO2: 99% (07 Oct 2021 06:45) (97% - 100%)                          11.5   15.48 )-----------( 179      ( 07 Oct 2021 00:57 )             35.0    10-07    129<L>  |  95<L>  |  17  ----------------------------<  89  3.9   |  18<L>  |  0.42<L>    Ca    10.3      07 Oct 2021 00:57  Phos  2.4     10-07  Mg     1.9     10-07    TPro  8.4<H>  /  Alb  4.2  /  TBili  1.3<H>  /  DBili  x   /  AST  21  /  ALT  17  /  AlkPhos  191<H>  10-07       PHYSICAL EXAM:  Gen: NAD, well-developed  Head: Normocephalic, Atraumatic  Face: no edema/erythema/fluctuance  Eyes:  no scleral injection  Nose: Nares bilaterally patent, no discharge  Mouth: No Stridor / Drooling / Trismus.  Mucosa moist, tongue/uvula midline, oropharynx clear  Neck: #6 DCT inflated cuff with minimal serosanguinous oozing from stoma, no active bleeding, sutures x4 and umbilical tie in place.  No lymphadenopathy, trachea midline, no masses  Resp: ventilating well  CV: no peripheral edema/cyanosis

## 2021-10-07 NOTE — PROCEDURE NOTE - NSPROCNAME_GEN_A_CORE
Central Line Insertion
Tracheal Intubation
Central Line Insertion
General
Interventional Radiology
Central Line Insertion
Chest Tube
Arterial Puncture/Cannulation
Midline Insertion
Arterial Puncture/Cannulation
Central Line Insertion
Arterial Puncture/Cannulation
General
Arterial Puncture/Cannulation
Central Line Insertion
Urinary Device Placement
Central Line Insertion
Arterial Puncture/Cannulation
Central Line Insertion

## 2021-10-07 NOTE — PROCEDURE NOTE - NSCVLATTEMPTSITEVASC_A_CORE
right/internal jugular
right/subclavian vein
left/internal jugular
right/internal jugular
right/internal jugular
left/internal jugular
right/internal jugular
right/internal jugular
right/subclavian vein
right/internal jugular

## 2021-10-07 NOTE — PROCEDURE NOTE - NSPATIENTPOSTION_GEN_A_CORE
supine
sitting
supine
Trendelenburg
Trendelenburg
supine
sitting
supine
Trendelenburg
(semi) fowlers
supine
supine
Trendelenburg
Trendelenburg

## 2021-10-07 NOTE — PROCEDURE NOTE - NSSPECDEVICE_VASC_A_CORE
monitoring device

## 2021-10-07 NOTE — PROCEDURE NOTE - NSCHLORHEXIDINEBATH_GEN_A_CORE
2% wipes/To be discontinued when line removed
2% wipes/To be discontinued when line removed
To be discontinued when line removed
2% wipes/To be discontinued when line removed
2% wipes
2% wipes/To be discontinued when line removed
4% solution/To be discontinued when line removed
2% wipes/To be discontinued when line removed
4% solution/To be discontinued when line removed
2% wipes/To be discontinued when line removed

## 2021-10-07 NOTE — PROCEDURE NOTE - NSANESTHESIA_GEN_A_CORE
1% lidocaine

## 2021-10-07 NOTE — PROCEDURE NOTE - PRACTITIONER PERFORMING THE TIME OUT
kelli
CLAUDIA Haskins
марина
sjohn
Sjohn
Lenny
Sjohn
марина
Sjohn
kelli
sjohn
марина
Shadylas
kelli
KRISTINE Sellers, PA
Sjohn
CLAUDIA Ayala
kelli
Shadylas
марина
Georgina Perez

## 2021-10-07 NOTE — PROGRESS NOTE ADULT - ASSESSMENT
65 year old male with history of Stage D HF due to NICM on LVAD, TV annuloplasty ring 9/12/17 as destination therapy due to severe peripheral artery disease with significant stenosis  SIADH, Depression, CKD-3 with hyperkalemia, admitted 6/14/21 with symptomatic anemia with Hgb 4.5 in setting of INR 8.8, thereafter with complicated hospital course including VAP, serratia bacteremia with acalculous cholecystitis s/p percutaneous tube, abdominal pain s/p attempted SMA stent on 9/10/21, RP bleed, now reintubated in setting of aspiration PNA.     Endocrine consulted for management of hyperthyroidism in the setting of recent amiodarone use and 2 IV contrast CTs with (7/28 and 8/13) and steroid induced hyperglycemia on tube feeds, now resolved    1. 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 propranolol on hold due to low BP. Increase methimazole to 20mg q8. Pt. was treated with steroids initially with better improvement in TFTs. Would recommend restarting steroids for more likely Type 2 AIT. Start prednisone 40mg daily x 2 weeks with taper of 10mg every week after the 2 weeks. Will likely need to decrease methimazole if steroids start to improve TFTs more rapidly. Repeat Free T4 on 10/13.  Assess bilirubin and LFTs. MMI can have rare SE of agranuocytosis and hepatic dysfuntion  current increase in bili is multifactorial and likely also component of venous congestion and would not stop MMI for increase in bili,  monitor LFTS closely     2. Steroid induced hyperglycemia, s/p completion of steroids  -Continue low dose correctional scale q6 hrs. Cristofer need NPH now on prednisone. Will continue to monitor.      Billy Tipton D.O  [phone]

## 2021-10-07 NOTE — PROGRESS NOTE ADULT - SUBJECTIVE AND OBJECTIVE BOX
SUBJECTIVE: Pt seen and examined on rounds. No acute events overnight.        OBJECTIVE    PHYSICAL EXAM:  Gen: NAD, well-developed, responsive with head shakes  HEENT: trach collar  Abd: soft, non-distended, mild tenderness      VITALS  T(C): 37 (10-07-21 @ 08:00), Max: 37.3 (10-07-21 @ 04:00)  HR: 80 (10-07-21 @ 12:00) (74 - 153)  BP: --  RR: 31 (10-07-21 @ 12:00) (15 - 42)  SpO2: 100% (10-07-21 @ 12:00) (97% - 100%)  CAPILLARY BLOOD GLUCOSE      POCT Blood Glucose.: 114 mg/dL (07 Oct 2021 12:14)  POCT Blood Glucose.: 88 mg/dL (07 Oct 2021 05:13)  POCT Blood Glucose.: 79 mg/dL (06 Oct 2021 18:04)      Is/Os    10-06 @ :  -  10-07 @ 07:00  --------------------------------------------------------  IN:    Albumin 5%  - 250 mL: 250 mL    Dexmedetomidine: 55.7 mL    Enteral Tube Flush: 120 mL    IV PiggyBack: 300 mL    Miscellaneous Tube Feedin mL    sodium chloride 0.9%: 220 mL    Vasopressin: 41 mL  Total IN: 1216.7 mL    OUT:    Drain (mL): 150 mL    Emesis (mL): 950 mL    Indwelling Catheter - Urethral (mL): 860 mL  Total OUT: 1960 mL    Total NET: -743.3 mL      10-07 @ 07:  -  10-07 @ 12:39  --------------------------------------------------------  IN:    Albumin 5%  - 250 mL: 250 mL    Dexmedetomidine: 9.3 mL    Miscellaneous Tube Feedin mL    Propofol: 15 mL    Vasopressin: 13 mL  Total IN: 307.3 mL    OUT:    Indwelling Catheter - Urethral (mL): 100 mL  Total OUT: 100 mL    Total NET: 207.3 mL          MEDICATIONS (STANDING): albumin human  5% IVPB 250 milliLiter(s) IV Intermittent once  dexMEDEtomidine Infusion 1.5 MICROgram(s)/kG/Hr IV Continuous <Continuous>  insulin lispro (ADMELOG) corrective regimen sliding scale   SubCutaneous every 6 hours  meropenem  IVPB 1000 milliGRAM(s) IV Intermittent every 8 hours  methimazole 20 milliGRAM(s) Oral <User Schedule>  metoclopramide Injectable 10 milliGRAM(s) IV Push every 8 hours  multivitamin 1 Tablet(s) Oral daily  pantoprazole  Injectable 40 milliGRAM(s) IV Push every 12 hours  propofol Infusion 13.528 MICROgram(s)/kG/Min IV Continuous <Continuous>  sodium chloride 0.9%. 1000 milliLiter(s) IV Continuous <Continuous>  spironolactone 12.5 milliGRAM(s) Oral every 12 hours  thiamine 100 milliGRAM(s) Oral daily  vancomycin    Solution 125 milliGRAM(s) Oral every 12 hours  vasopressin Infusion 0.017 Unit(s)/Min IV Continuous <Continuous>    MEDICATIONS (PRN):acetaminophen    Suspension .. 650 milliGRAM(s) Enteral Tube every 6 hours PRN Mild Pain (1 - 3)  simethicone 80 milliGRAM(s) Chew every 8 hours PRN Gas      LABS  CBC (10-07 @ 00:57)                              11.5<L>                         15.48<H>  )----------------(  179        --    % Neutrophils, --    % Lymphocytes, ANC: --                                  35.0<L>    BMP (10-07 @ 00:57)             129<L>  |  95<L>   |  17    		Ca++ --      Ca 10.3               ---------------------------------( 89    		Mg 1.9                3.9     |  18<L>   |  0.42<L>			Ph 2.4<L>    LFTs (10-07 @ 00:57)      TPro 8.4<H> / Alb 4.2 / TBili 1.3<H> / DBili -- / AST 21 / ALT 17 / AlkPhos 191<H>        ABG (10-07 @ 00:43)     7.35 / 37 / 168<H> / 20<L> / -4.7<L> / 99.9<H>%     Lactate:    ABG (10-06 @ 12:44)     7.31<L> / 43 / 179<H> / 22 / -4.4<L> / 100.0<H>%     Lactate:          IMAGING STUDIES

## 2021-10-07 NOTE — PROGRESS NOTE ADULT - SUBJECTIVE AND OBJECTIVE BOX
RICKY JOINT  MRN#: [id number]  Subjective:  Pulmonary progress  : recurrent Acute hypoxic respiratory Failure ,aspiration pneumonia, NICM  ,   65M PMH ACC/AHA stage D HF due to NICM HM2 LVAD , TV annuloplasty ring 17 as destination therapy due to severe peripheral artery disease with significant stenosis  SIADH, Depression, CKD-3 with hyperkalemia, past E. coli UTIs, driveline drainage (21) and COVID-19 (back in 2020)  He was recently seen in clinic where he complained of abdominal pain and dark stools w constipation back in May. He presents to [facility] ER today weakness and fatigue, moderate and + Black stools for three days, on coumadin secondary to warfarin use in the setting of an LVAD. Patient has required transfusions for GIB in the past. Mostly recently back in 2021 pt had anemia with dark stools. No interventions was done at that time. However Last Endoscopy was done in 2020 (negative). Today labs show patient is anemic with H/H of 4.5/16.3,. INR is 8.84 MAP in the 90s, Temp 35.1. He denies any chest pain, shortness of breath, dizziness, abd pain, nausea or vomiting. found to have  rectal bleeding underwent endoscopy ,old blood in the proximal ileum ,  develop sepsis with LL opacity given Antibiotics , Extubated , reintubated , Bronchoscopy on Zosyn for LL pneumonia  and Amiodarone S/P TV Annuloplasty , patient remain intubated on full ventilatory support .S/P multiple units of blood transfusion , remain on full ventilatory support on Precedex and propofol , new central IJ line , diarrhea C diff. +ve on po vancomycin and IV Flagyl,  mildly distended belly , fever start on cefepime 2gm q 8 hrs S/P tracheostomy .  new RT Subclavian central line continue on contact  isolation ,S/P  C-diff antibiotics, no more diarrhea back on full support mechanical ventilator , chest x ray show improvement in LLL air space disease, more awake and responsive on tube feeding no more diarrhea ,  no nausea or vomiting or diarrhea still very weak and tired , back on tube feeding ,still on po vancomycin , getting PT and OT at bed side ,   , no SOB getting stronger , improve muscle tone patient transfer to monitor bed still on contact isolation for C-Difficel colitis on 50% FI02, and change to Suresh  tube feeding still loose stool . H/H drop significantly require blood transfusion , most likely GI bleeding , IV heparin D/C ,  H/H is stable ., patient develop TR sided  pneumothorax require chest tube placement , RT IJ central line  placed , develop fever shaking chills , blood culture positive for serratia marcescens , start on cefepime .the patient  become hypoxic and hypotensive placed on full ventilatory support and Vasopressin , levophed and Dobutamine ,S/P blood transfusion on meropenem and vancomycin ,   , on and  off pressors , occasional agitation on Precedex .S/P IR cholecystostomy tube drainage placement in the RT upper Quadrant , resume anticoagulation chest x ray noted C-PAP trail lasted only for 2 hrs , new RT SC line and D/C RT IJ line , RT pig tail cathter has been removed , tolerating C-PAP trial placed on trach. collar 50% FI02 GI consultant noted on NG tube feeding as tolerated , develop AF RVR S/P  bolus Amiodarone  back to regular sinus Rhythm , Flat Affect depressed , back on tube feeding Vital AF at 60 cc/ hr .still intermittent abdominal pain , no fever saturation is accepted  back on full ventilatory support ,   on methimazole for hyperthyroidism ,  condition is the same ,still C/O Abdominal pain , white count is improving , no chest pain or SOB ,  .placed on Reglan 10 mg TID for gastric motility , depressed , withdrawn. , S/P  mesenteric angiogram , unable to stent SMA S/P 3 units of blood transfusion   RT IJ in place reassessed for stent in the SMA by vascular,  dark stool stable H/H ,surgical opinion for possible Lap cholecystectomy. over night events noted develop respiratory distress, , rectal bleeding, require intubation placed on full ventilatory support , FI02 is 50% also became hemianosmically unstable require triple pressor support with levophed , vasopressin and norsynephrine, pan culture placed  on Vancomycin IV and PO  and Zosyn, sedated with propofol kept NPO , new central line RT and left IJ cathter placed . Diflucan Added .fever of 38.5 weaned off  vasopressin , all culture are negative, resume tube feeding ,  white count is improving , on meropenem, vancomycin solution  , on Precedex, no over night events , S/P  tracheostomy , bleeding receiving blood transfusion on vasopressin , no more bleeding , no fever , more awake and responsive ,tolerating tube feeding      (2021 16:57)    PAST MEDICAL & SURGICAL HISTORY:  CHF (congestive heart failure)    CAD (coronary artery disease)  Depression    Pleural effusion    History of 2019 novel coronavirus disease (COVID-19)  2020    Hemorrhoids    Bleeding hemorrhoids    Peripheral arterial disease    Claudication    BPH with urinary obstruction    ACC/AHA stage D systolic heart failure  Anticoagulation goal of INR 2.0 to 2.5    Falls    Clavicle fracture    CKD (chronic kidney disease), stage III    Iron deficiency anemia    H/O epistaxis    Vertigo    GI bleed    S/P TVR (tricuspid valve repair)    S/P ventricular assist device    S/P endoscopy    OBJECTIVE:  ICU Vital Signs Last 24 Hrs  T(C): 38.2 (2021 10:00), Max: 38.5 (2021 12:00)  T(F): 100.8 (2021 10:00), Max: 101.3 (2021 12:00)  HR: 65 (2021 10:00) (61 - 69)  BP: --  BP(mean): --  ABP: 105/67 (2021 10:00) (90/54 - 113/64)  ABP(mean): 77 (2021 10:00) (63 - 77)  RR: 20 (2021 10:00) (19 - 35)  SpO2: 99% (2021 10:00) (96% - 100%)       @ 07: @ 07:00  --------------------------------------------------------  IN: 2693.9 mL / OUT: 1415 mL / NET: 1278.9 mL     @ 07: @ 10:49  --------------------------------------------------------  IN: 420.8 mL / OUT: 115 mL / NET: 305.8 mL  PHYSICAL EXAM: Daily   Elderly male intubated on full ventilatory support FI02 is 40% S/P tracheostomy   on  vasopressin , Precedex , blood  transfusion   Daily Weight in k.4 (2021 00:00)  HEENT:     + NCAT  + EOMI  - Conjuctival edema   - Icterus   - Thrush   - ETT  + NGT/OGT  Neck:         + FROM  RT IJ , LT IJ  lines JVD  - Nodes - Masses + Mid-line trachea + Tracheostomy  Chest:            normal A-P diameter    Lungs:          + CTA   + Rhonchi    - Rales    - Wheezing + Decreased  LT BS   - Dullness R L  Cardiac:       + S1 + S2    + RRR   - Irregular   - S3  - S4    - Murmurs   - Rub   - Hamman’s sign   Abdomen:    + BS  + Soft + Non-tender - Distended - Organomegaly - PEG .cholecystostomy tube in place  Extremities:   - Cyanosis U/L   - Clubbing  U/L  + LE/UE Edema   + Capillary refill    + Pulses   Neuro:        - Awake   -  Alert   - Confused   - Lethargic   + Sedated  + Generalized Weakness  Skin:        - Rashes    - Erythema   + Normal incisions   + IV sites intact          HOSPITAL MEDICATIONS: All medications reviewed and analyzed  MEDICATIONS  (STANDING):  amiodarone    Tablet 200 milliGRAM(s) Oral daily  chlorhexidine 0.12% Liquid 15 milliLiter(s) Oral Mucosa every 12 hours  chlorhexidine 2% Cloths 1 Application(s) Topical <User Schedule>  dexmedetomidine Infusion 0.5 MICROgram(s)/kG/Hr (9.81 mL/Hr) IV Continuous <Continuous>  dextrose 50% Injectable 50 milliLiter(s) IV Push every 15 minutes  heparin  Infusion 400 Unit(s)/Hr (12.5 mL/Hr) IV Continuous <Continuous>  Hydromorphone  Injectable 0.5 milliGRAM(s) IV Push once  insulin lispro (ADMELOG) corrective regimen sliding scale   SubCutaneous every 6 hours  pantoprazole  Injectable 40 milliGRAM(s) IV Push every 12 hours  piperacillin/tazobactam IVPB.. 3.375 Gram(s) IV Intermittent every 8 hours  propofol Infusion 20 MICROgram(s)/kG/Min (9.42 mL/Hr) IV Continuous <Continuous>  sodium chloride 0.9% lock flush 3 milliLiter(s) IV Push every 8 hours  sodium chloride 0.9%. 1000 milliLiter(s) (10 mL/Hr) IV Continuous <Continuous>    MEDICATIONS  (PRN):  acetaminophen    Suspension .. 650 milliGRAM(s) Oral every 6 hours PRN Temp greater or equal to 38C (100.4F)    LABS: All Lab data reviewed and analyzed                        11.5   1548 )-----------( 179      ( 07 Oct 2021 00:57 )             35.0     10-07    129<L>  |  95<L>  |  17  ----------------------------<  89  3.9   |  18<L>  |  0.42<L>    Ca    10.3      07 Oct 2021 00:57  Phos  2.4     10-07  Mg     1.9     10-    TPro  8.4<H>  /  Alb  4.2  /  TBili  1.3<H>  /  DBili  x   /  AST  21  /  ALT  17  /  AlkPhos  191<H>  10-    Ca    9.8      06 Oct 2021 00:26  Phos  2.7     10-06  Mg     1.9     10-06    TPro  7.8  /  Alb  4.0  /  TBili  1.6<H>  /  DBili  x   /  AST  18  /  ALT  20  /  AlkPhos  177<H>  10-06                                                                                                                                                                        PTT - ( 2021 04:52 )  PTT:45.2 sec LIVER FUNCTIONS - ( 2021 00:42 )  Alb: 3.4 g/dL / Pro: 6.7 g/dL / ALK PHOS: 213 U/L / ALT: 15 U/L / AST: 24 U/L / GGT: x           RADIOLOGY: - Reviewed and analyzed RT Pig tail cathter  , LVAD HM2, CT scan of abdomen reviewed result noted

## 2021-10-07 NOTE — GOALS OF CARE CONVERSATION - ADVANCED CARE PLANNING - CONVERSATION DETAILS
full note to f/u.     In a simple way the patient was able to demonstrate appropriate understanding about his SMA occlusion and the impact it may have on his symptoms and capacity to tolerate nutrition. He was also able to demonstrate appreciation (understanding risks and benefits) of a possible intervention to try to resolve his SMA occlusion. He also understood that if not procedure is done that then he will continue to decline and will never be able to be DC; however, if willing to have a vacular procedure for his MALAIKA occlusion that then he may have a chance for improving his gastric symptoms and maybe being able to be DC. At the end of our discussion, the patient wanted to think about options.     I will f/u with him on 10/11    d/w . By nodding his head and using facial and manual gestures and by using different test questions, we determine the patient was able to communicated.     Vascular surgery d/w the patient about his current issues (abdominal pain and lack of capacity to tolerate nutrition 2/2 to SMA occlusion) and possible treatment options (SMA stenting via upper extremity)     By using very simple terms, the patient was able to demonstrate appropriate understanding about his SMA occlusion and the impact it may have on his symptoms and capacity to tolerate nutrition. He was also able to demonstrate appreciation (understanding risks [mainly bleeding, infection, brain, heart, or lung injury] and benefits [improving abdominal pain and being able to tolerate nutrition]) of a possible intervention to try to resolve his SMA occlusion. He also understood that if not procedure is done that then he will continue to decline and will never be able to be DC; however, if willing to have a vacular procedure for his MALAIKA occlusion that then he may have a chance for improving his gastric symptoms and maybe being able to be DC. At the end of our discussion, the patient wanted to think about options.     I will be away until 10/12; However, I will ask one of the palliative team members to f/u on this conversation on Monday, 10/11.     d/w NP By nodding his head and using facial and manual gestures and by using different test questions, we determine the patient was able to communicated.     Vascular surgery d/w the patient about his current issues (abdominal pain and lack of capacity to tolerate nutrition 2/2 to SMA occlusion) and possible treatment options (SMA stenting via upper extremity)     By using very simple terms, the patient was able to demonstrate appropriate understanding about his SMA occlusion and the impact it may have on his symptoms and capacity to tolerate nutrition. He was also able to demonstrate appreciation (understanding risks [mainly bleeding, infection, brain, heart, or lung injury] and benefits [improving abdominal pain and being able to tolerate nutrition]) of a possible intervention to try to resolve his SMA occlusion. He also understood that if not procedure is done that then he will continue to decline and will never be able to be DC; however, if willing to have a vacular procedure for his MALAIKA occlusion that then he may have a chance for improving his gastric symptoms and maybe being able to be DC. At the end of our discussion, the patient wanted to think about options.     I will be away until 10/12; However, I will ask one of the palliative team members to f/u on this conversation on Monday, 10/11.     d/w CLAUDIA Bazzi.         Francisco Burgos MD   Geriatrics and Palliative Care (GAP) Consult Service    of Geriatric and Palliative Medicine  [facility]      Please page the following number for clinical matters between the hours of 9 am and 5 pm from Monday through Friday : [phone]    After 5pm and on weekends, please see the contact information below:    In the event of newly developing, evolving, or worsening symptoms, please contact the Palliative Medicine team via pager (if the patient is at [facility] #[phone] or if the patient is at [facility] #[phone]) The Geriatric and Palliative Medicine service has coverage 24 hours a day/ 7 days a week to provide medical recommendations regarding symptom management needs via telephone

## 2021-10-07 NOTE — PROCEDURE NOTE - NSPERFORMEDBY_GEN_A_CORE
Myself
Myself/Attending
PA
Myself
NP
Myself

## 2021-10-07 NOTE — PROCEDURE NOTE - NSTIMEOUT_GEN_A_CORE
Patient's first and last name, , procedure, and correct site confirmed prior to the start of procedure.

## 2021-10-07 NOTE — PROGRESS NOTE ADULT - SUBJECTIVE AND OBJECTIVE BOX
RICKY HAMPTON  MRN-[id number]  Patient is a 66y old  Male who presents with a chief complaint of Anemia, Supratherapeutic INR, Dark Stools (07 Oct 2021 07:48)    HPI:  65M PMH ACC/AHA stage D HF due to NICM HM2 LVAD , TV annuloplasty ring 17 as destination therapy due to severe peripheral artery disease with significant stenosis  SIADH, Depression, CKD-3 with hyperkalemia, past E. coli UTIs, driveline drainage (21) and COVID-19 (back in 2020)  He was recently seen in clinic where he complained of abdominal pain and dark stools w constipation back in May. He presents to [facility] ER today weakness and fatigue, moderate and + Black stools for three days, on coumadin secondary to warfarin use in the setting of an LVAD. Patient has required transfusions for GIB in the past. Mostly recently back in 2021 pt had anemia with dark stools. No interventions was done at that time. However Last Endoscopy was done in 2020 (negative). Today labs show patient is anemic with H/H of 4.5/16.3,. INR is 8.84 MAP in the 90s, Temp 35.1. He denies any chest pain, shortness of breath, dizziness, abd pain, nausea or vomiting.       (2021 16:57)      Surgery/Hospital Course:   admit for melena w/ anemia, INR 8.84   6/15 Capsul study (+) for small bowel bleed, balloon endoscopy (old blood in prox ileum); post EGD - septic w/ L opacity, re-intubated for concern for aspiration, TTE (Mod MR, decrease biV w/ interventricular septum boweing towards R)   bronch    +C Diff    CT C/A/P: Fluid filled colon which may be 2/2 rapid transit. Small bilateral pleffs with associates. Compressive atelectasis New ISABELLE & LLL  parenchymal opacities, suspicious for pneumonia. Moderate stenosis in the proximal superior mesenteric artery.    #8 Shiley trach at bedside    LVAD speed increased to 9200   Bronch   TC since . Patient transferred to SDU.    INR today 2.64.  H&H 7.3/24 this AM.  Will repeat CBC at noon, and will send stool guaiac Patient with persistent abdominal tenderness, rate of tube feeds decreased.  No nausea/vomiting.     INR today 2.4. H&H 9.1/28.6 low flow overnight /N&V, refusing Tube feeds on D5 1/2 normal  @50 cc/hr   INR 2.69  H&H 7.7/.1 refusing Tube feeds on D5 1/2 normal  @50 cc/hr. This am + BM Melena Dr Oneill HF  aware- PRBC x1  GI team consulted -  NPO  plan on study in am-  D/w Dr Cadet Patient  to return to CTU for further management; 1PRBCS    Post op INR 2.2 today.  No bleeding. BC + for SM.  Pt is hypotensive requiring pressor and inotropic support.  ID follow up today on Cefepime will follow.   R PTC for PTX    CT C/A/P: sub q emphysema in R chest wall, GGO RUL, small ascites CTH negative; Abd US: GB thickening, pericholecystic fluid     Perchole drain in place continues to drain total output overnight 133.  Fever today 38.8    duplex LE negative    Patient with persistent abdominal pain, refusing tube feeds and medications, Psych consulted   CTA A/P ordered to r/o mesenteric ischemia 2/2 persistent anorexia, nausea, vomiting. Revealed:  Evaluation of the mesenteric vessels is limited by streak artifact from LVAD. There appears to be severe stenosis of the proximal SMA; abdominal mesenteric doppler is recommended for further evaluation. 2.  No small bowel findings to suggest acute mesenteric ischemia. 3.  Focal dissections involving the right and left common iliac arteries.  8/15: Cultures resulted BC 1/2 +GPC in clusters, SC enterobacter; mesenteric duplex: borderline stenosis of proximal SMA  : CT C/A/P noncon: Nondular opacities in R lung apex w/cavitation, abd nl  :  Continue current care, treatment of thyrotoxicosis with medications as per endocrine, d/c ABX as per team.    RUQ sono: Contracted gallbladder with cholecystostomy tube in place.  9/10 failed SMA stent, L fem PSA, s/p 3U PRCB   CTA Abd/Pelvis L RP hematoma    Trach decannulated    intubated fro resp distress for increased WOB, LL pneumonia; CT C/A/P: progressive ISABELLE opacities and L consolidations, multifocal pneumonia    TTE (EF 25%, decreased RV, mod MR)   10/3 VT -> Lidocaine gtt   10/4 Trach size 6 DCT cuff  10/5 CT abd (neg for intra-abd abcess, severe stenosis of prox SMA and b/l iliac arteries)    Today:    REVIEW OF SYSTEMS:  Unable to obtain due to patients trach     ICU Vital Signs Last 24 Hrs  T(C): 37 (07 Oct 2021 08:00), Max: 37.3 (07 Oct 2021 04:00)  T(F): 98.6 (07 Oct 2021 08:00), Max: 99.1 (07 Oct 2021 04:00)  HR: 112 (07 Oct 2021 10:00) (74 - 153)  BP: --  BP(mean): --  ABP: 93/66 (07 Oct 2021 10:00) (71/60 - 162/76)  ABP(mean): 76 (07 Oct 2021 10:00) (63 - 132)  RR: 22 (07 Oct 2021 10:00) (16 - 38)  SpO2: 100% (07 Oct 2021 10:00) (97% - 100%)      Physical Exam:  Gen:  intubated, NAD  CNS: moves all extremities to command on low dose sedation   Neck: no JVD, +trach   RES : coarse breath sounds, no wheezing    CVS: +LVAD hum   Abd: Soft. Sybil drain with bilious fluid. Positive BS throughout. Mild RUQ abdominal tenderness by perc sybil.  : Landrum intact draining urine without hematuria.  Skin: No rash, erythema, cyanosis.  Vasc: Warm and well-perfused.  Ext:  no edema    ============================I/O===========================   I&O's Detail    06 Oct 2021 07:01  -  07 Oct 2021 07:00  --------------------------------------------------------  IN:    Albumin 5%  - 250 mL: 250 mL    Dexmedetomidine: 55.7 mL    Enteral Tube Flush: 120 mL    IV PiggyBack: 300 mL    Miscellaneous Tube Feedin mL    sodium chloride 0.9%: 220 mL    Vasopressin: 41 mL  Total IN: 1216.7 mL    OUT:    Drain (mL): 150 mL    Emesis (mL): 950 mL    Indwelling Catheter - Urethral (mL): 860 mL  Total OUT: 1960 mL    Total NET: -743.3 mL      07 Oct 2021 07:01  -  07 Oct 2021 11:32  --------------------------------------------------------  IN:    Dexmedetomidine: 9.3 mL    Miscellaneous Tube Feedin mL    Propofol: 5 mL    Vasopressin: 3 mL  Total IN: 37.3 mL    OUT:    Indwelling Catheter - Urethral (mL): 30 mL  Total OUT: 30 mL    Total NET: 7.3 mL        ============================ LABS =========================                        11.5   15.48 )-----------( 179      ( 07 Oct 2021 00:57 )             35.0     10-07    129<L>  |  95<L>  |  17  ----------------------------<  89  3.9   |  18<L>  |  0.42<L>    Ca    10.3      07 Oct 2021 00:57  Phos  2.4     10-07  Mg     1.9     10-    TPro  8.4<H>  /  Alb  4.2  /  TBili  1.3<H>  /  DBili  x   /  AST  21  /  ALT  17  /  AlkPhos  191<H>  10-07    LIVER FUNCTIONS - ( 07 Oct 2021 00:57 )  Alb: 4.2 g/dL / Pro: 8.4 g/dL / ALK PHOS: 191 U/L / ALT: 17 U/L / AST: 21 U/L / GGT: x             ABG - ( 07 Oct 2021 00:43 )  pH, Arterial: 7.35  pH, Blood: x     /  pCO2: 37    /  pO2: 168   / HCO3: 20    / Base Excess: -4.7  /  SaO2: 99.9                ======================Micro/Rad/Cardio=================  Culture: Reviewed   CXR: Reviewed  Echo:Reviewed  ======================================================  PAST MEDICAL & SURGICAL HISTORY:  CHF (congestive heart failure)    CAD (coronary artery disease)    Depression    Pleural effusion    History of  novel coronavirus disease (COVID-19)  2020    Hemorrhoids    Bleeding hemorrhoids    Peripheral arterial disease    Claudication    BPH with urinary obstruction    ACC/AHA stage D systolic heart failure    Anticoagulation goal of INR 2.0 to 2.5    Falls    Clavicle fracture    CKD (chronic kidney disease), stage III    Iron deficiency anemia    H/O epistaxis    Vertigo    GI bleed    S/P TVR (tricuspid valve repair)    S/P ventricular assist device    S/P endoscopy      ====================ASSESSMENT ==============  Stage D Nonischemic Cardiomyopathy, Status Post HM2 on 2017    Cardiogenic shock  Hemodynamic instability   Acute hypoxemic respiratory failure s/p trach , decannulated on ; Reintubated on    GI bleed , Status Post Enteroscopy   Anemia, in setting of melena   Chronic Kidney Disease  Stress hyperglycemia   C.diff positive on    Hypovolemic shock  Septic shock  Leukocytosis  GB thickening/percholecystic s/p perc choley by IT   SMA stenosis  Serratia/citrobacter pneumonia   Stenotrophomonas pneumonia   Enterobacter pneumonia   Nasuea/vomiting  Deconditioning    Plan:  ====================== NEUROLOGY=====================  Sedated with IV Precedex and IV Propofol to maintain vent synchrony   Tylenol PRN for analgesia     acetaminophen    Suspension .. 650 milliGRAM(s) Enteral Tube every 6 hours PRN Mild Pain (1 - 3)  dexMEDEtomidine Infusion 1.5 MICROgram(s)/kG/Hr (23.1 mL/Hr) IV Continuous <Continuous>  propofol Infusion 13.528 MICROgram(s)/kG/Min (5 mL/Hr) IV Continuous <Continuous>    ==================== RESPIRATORY======================  Acute hypoxemic respiratory failure s/p #8 shiley trach on ; decannulated on ; Reintubated on ; trach size 6 DCT cuff on 10/4   Continue close monitoring of respiratory rate and breathing pattern, following of ABG’s with A-line monitoring, continuous pulse oximetry monitoring.     Mechanical Ventilation:  Mode: AC/ CMV (Assist Control/ Continuous Mandatory Ventilation)  RR (machine): 12  TV (machine): 500  FiO2: 40  PEEP: 5  ITime: 1  MAP: 10  PIP: 22      ====================CARDIOVASCULAR==================  Stage D Nonischemic Cardiomyopathy, Status Post HM2 on 2017; LVAD settings 9200, flow 5.8  TTE 10/4:  Severely decreased LV systolic function, Diffuse hypokinesis. Mild AR. No pericardial effusion   VT on 10/4, s/p Lidocaine drip, continue close tele monitoring   Pressor support with IV Vasopressin   Holding off on Propranolol for now     vasopressin Infusion 0.017 Unit(s)/Min (1 mL/Hr) IV Continuous <Continuous>    ===================HEMATOLOGIC/ONC ===================  CTA A/P on  +L RP hematoma   Acute blood loss anemia, H/H 10.9/33.7, S/P 2U of pRBCs yesterday and monitor CBC   Thrombocytopenia, possible in setting of sepsis, now starting to trend up, 130->179, Rest of labs not consistent with DIC  Holding coumadin and ASA given recurrent GI bleeding, continue monitoring LDH   Monitor H&H/Plts     ===================== RENAL =========================  Hx of CKD, continue monitoring urine output, I&OS, BUN/Cr   Euvolemic, even fluid balance, currently off diuretics.    Replete lytes PRN. Keep K> 4 and Mg >2  C/w Aldactone for diuresis    spironolactone 12.5 milliGRAM(s) Oral every 12 hours    ==================== GASTROINTESTINAL===================  Held TFs this AM for line changes   CT A/P 10/5 neg for intra-abd abcess, severe stenosis of prox SMA and b/l iliac arteries  CTA A/P : Evaluation of the mesenteric vessels is limited by streak artifact from LVAD. There appears to be severe stenosis of the proximal SMA; abdominal mesenteric doppler is recommended for further evaluation. 2.  No small bowel findings to suggest acute mesenteric ischemia. 3.  Focal dissections involving the right and left common iliac arteries.  Mesenteric duplex on 8/15 borderline stenosis of proximal SMA, s/p failed SMA stent, L fem RP hematoma on 9/10; No intervention for cholecystostomy tube/PEJ at this time   Reglan for gut motility  PRN Simethicone for gas    albumin human  5% IVPB 250 milliLiter(s) IV Intermittent once  multivitamin 1 Tablet(s) Oral daily  GI prophylaxis, pantoprazole  Injectable 40 milliGRAM(s) IV Push every 12 hours  simethicone 80 milliGRAM(s) Chew every 8 hours PRN Gas  sodium chloride 0.9%. 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       Per endocrine      - c/w Methimazole, adjust based on labs        - Check Free T4 and total T3     insulin lispro (ADMELOG) corrective regimen sliding scale   SubCutaneous every 6 hours  methimazole 20 milliGRAM(s) Oral <User Schedule>    ========================INFECTIOUS DISEASE================  Afebrile, WBC rising 11.59->15.48   Continue trending WBC and monitoring fever curve   CT C/A/P : progressive ISABELLE opacities and L consolidations, multifocal pneumonia  BCx  + Serratia marcescens, BCx 1/2 on 10/1 +Enterobacter cloacae complex, BCx 10/3 +GNR, repeat BCx x2 10/5 NGTD   Empiric coverage with Meropenem   Vancomycin solution for C.diff prophylaxis  Will f/u antibiotic regimen with ID    meropenem  IVPB 1000 milliGRAM(s) IV Intermittent every 8 hours  vancomycin    Solution 125 milliGRAM(s) Oral every 12 hours      Patient requires continuous monitoring with bedside rhythm monitoring, pulse ox monitoring, and intermittent blood gas analysis. Care plan discussed with ICU care team. Patient remained critical and at risk for life threatening decompensation.     By signing my name below, IAgnieszka, attest that this documentation has been prepared under the direction and in the presence of Aroldo Wright NP   Electronically signed: Romel Shaffer, 10-07-21 @ 11:32    I, Aroldo Wright, personally performed the services described in this documentation. all medical record entries made by the romel were at my direction and in my presence. I have reviewed the chart and agree that the record reflects my personal performance and is accurate and complete  Electronically signed: Aroldo Wright NP

## 2021-10-07 NOTE — PROGRESS NOTE ADULT - ASSESSMENT
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 coli UTIs, driveline drainage (1/7/21) and COVID-19 (back in April 2020)  He was recently seen in clinic where he complained of abdominal pain and dark stools w constipation back in May. He presents to [facility] ER today weakness and fatigue, moderate and + Black stools for three days, on coumadin secondary to warfarin use in the setting of an LVAD. Patient has required transfusions for GIB in the past. Mostly recently back in jan 2021 pt had anemia with dark stools. No interventions was done at that time. However Last Endoscopy was done in July 2020 (negative). Today labs show patient is anemic with H/H of 4.5/16.3,. INR is 8.84 MAP in the 90s,   Returned from endoscopy appearing ill tachypneic with chills with new white out of his left lung      A/p  s/p LVAD placement  admission prolonged following GI bleeding, aspiration event, CDI, VAP, chronic abdominal pain, most recently with retroperitoneal bleeding post attempted stent placement  Acute event while on the floor with possible aspiration and required re-intubation and transfer to cTU  Antibiotics with Meropenem  serratia in blood culture on 9/30  and enterobacter in blood culture 10/1 likely from a GI source given the multiple GNRs grown but CT scan without evidence of a specific source raises the possiblilty of GI translocation in the setting on ongoing ischemic bowel?  Continue Meropenem  plan 2 week course  repeat blood cutlures sent and pending- NGTD    Morris Prater MD  [phone]  After 5pm/weekends [phone]               65M PMH ACC/AHA stage D HF due to NICM HM2 LVAD , TV annuloplasty ring 9/12/17 as destination therapy due to severe peripheral artery disease with significant stenosis  SIADH, Depression, CKD-3 with hyperkalemia, past E. coli UTIs, driveline drainage (1/7/21) and COVID-19 (back in April 2020)  He was recently seen in clinic where he complained of abdominal pain and dark stools w constipation back in May. He presents to [facility] ER today weakness and fatigue, moderate and + Black stools for three days, on coumadin secondary to warfarin use in the setting of an LVAD. Patient has required transfusions for GIB in the past. Mostly recently back in jan 2021 pt had anemia with dark stools. No interventions was done at that time. However Last Endoscopy was done in July 2020 (negative). Today labs show patient is anemic with H/H of 4.5/16.3,. INR is 8.84 MAP in the 90s,   Returned from endoscopy appearing ill tachypneic with chills with new white out of his left lung      A/p  s/p LVAD placement  admission prolonged following GI bleeding, aspiration event, CDI, VAP, chronic abdominal pain, most recently with retroperitoneal bleeding post attempted stent placement  Acute event while on the floor with possible aspiration and required re-intubation and transfer to cTU  Antibiotics with Meropenem  serratia in blood culture on 9/30  and enterobacter in blood culture 10/1 likely from a GI source given the multiple GNRs grown but CT scan without evidence of a specific source raises the possiblilty of GI translocation in the setting on ongoing ischemic bowel?  Continue Meropenem  plan 2 week course (until 10/16)  repeat blood cutlures sent and no growth    Morris Prater MD  [phone]  After 5pm/weekends [phone]

## 2021-10-07 NOTE — PROGRESS NOTE ADULT - ATTENDING COMMENTS
S/p tracheostomy. Trach in place and patent. Ventilating well. Will continue to follow. Any trach issues please call ENT [phone].

## 2021-10-08 NOTE — PROGRESS NOTE ADULT - SUBJECTIVE AND OBJECTIVE BOX
INFECTIOUS DISEASES FOLLOW UP-- Anitra Prater  [phone]    This is a follow up note for this  66yMale with  Anemia    Acute cholecystitis        ROS:  CONSTITUTIONAL:  No fever, good appetite  CARDIOVASCULAR:  No chest pain or palpitations  RESPIRATORY:  No dyspnea  GASTROINTESTINAL:  No nausea, vomiting, diarrhea, or abdominal pain  GENITOURINARY:  No dysuria  NEUROLOGIC:  No headache,     Allergies    Amiodarone Hydrochloride (Other (Severe))    Intolerances        ANTIBIOTICS/RELEVANT:  antimicrobials  meropenem  IVPB 1000 milliGRAM(s) IV Intermittent every 8 hours  vancomycin    Solution 125 milliGRAM(s) Oral every 12 hours    immunologic:    OTHER:  acetaminophen    Suspension .. 650 milliGRAM(s) Enteral Tube every 6 hours PRN  chlorhexidine 0.12% Liquid 15 milliLiter(s) Oral Mucosa two times a day  chlorhexidine 2% Cloths 1 Application(s) Topical <User Schedule>  fentaNYL    Injectable 12.5 MICROGram(s) IV Push every 3 hours PRN  insulin lispro (ADMELOG) corrective regimen sliding scale   SubCutaneous every 6 hours  methimazole 20 milliGRAM(s) Oral every 8 hours  metoclopramide Injectable 10 milliGRAM(s) IV Push every 8 hours  multivitamin 1 Tablet(s) Oral daily  pantoprazole  Injectable 40 milliGRAM(s) IV Push every 12 hours  predniSONE   Tablet 40 milliGRAM(s) Oral daily  simethicone 80 milliGRAM(s) Chew every 8 hours PRN  sodium chloride 0.9%. 1000 milliLiter(s) IV Continuous <Continuous>  spironolactone 12.5 milliGRAM(s) Oral every 12 hours  thiamine 100 milliGRAM(s) Oral daily      Objective:  Vital Signs Last 24 Hrs  T(C): 36.8 (08 Oct 2021 16:00), Max: 36.8 (08 Oct 2021 16:00)  T(F): 98.2 (08 Oct 2021 16:00), Max: 98.2 (08 Oct 2021 16:00)  HR: 115 (08 Oct 2021 18:00) (70 - 117)  BP: --  BP(mean): --  RR: 16 (08 Oct 2021 18:00) (16 - 26)  SpO2: 100% (08 Oct 2021 18:00) (99% - 100%)    PHYSICAL EXAM:  Constitutional:no acute distress  Eyes:ALONSO, EOMI  Ear/Nose/Throat: no oral lesions, 	  Respiratory: clear BL  Cardiovascular: S1S2  Gastrointestinal:soft, (+) BS, no tenderness  Extremities:no e/e/c  No Lymphadenopathy  IV sites not inflammed.    LABS:                        9.9    7.76  )-----------( 180      ( 08 Oct 2021 00:43 )             31.0     10-08    134<L>  |  99  |  20  ----------------------------<  214<H>  4.4   |  19<L>  |  0.37<L>    Ca    10.4      08 Oct 2021 00:43  Phos  2.2     10-08  Mg     1.5     10-08    TPro  8.3  /  Alb  4.4  /  TBili  1.2  /  DBili  x   /  AST  17  /  ALT  15  /  AlkPhos  167<H>  10-08          MICROBIOLOGY:            RECENT CULTURES:  10-07 @ 14:09  .Blood Blood-Peripheral  --  --  --    No growth to date.  --  10-05 @ 14:55  .Blood Blood-Peripheral  --  --  --    No growth to date.  --  10-03 @ 21:21  .Blood Blood  Serratia marcescens  Serratia marcescens  CLAU    Growth in anaerobic bottle: Serratia marcescens  --      RADIOLOGY & ADDITIONAL STUDIES:    < from: Xray Chest 1 View- PORTABLE-Routine (Xray Chest 1 View- PORTABLE-Routine .) (10.08.21 @ 00:44) >  IMPRESSION:    The visualized lungs are clear.    < end of copied text >

## 2021-10-08 NOTE — PROGRESS NOTE ADULT - ASSESSMENT
Assessment and Recommendation:   · Assessment	  Assessment and recommendation :  Recurrent Acute hypoxic respiratory Failure reintubated on full ventilator support FI02 is 40% S/P tracheostomy    Acute blood loss anemia S/P multiple  blood transfusion post tracheostomy   recurrent  septic shock  weaned off  vasopressin   hemorrhagic shock receiving 2 unit of blood transfusion   pan culture negative on meropenem  , po vancomycin   S/P cholecystostomy tube placement by IR    AF RVR back to regular sinus Rhythm   Non ischemic cardiomyopathy continue ACE inhibitor and B-Blockers   mesenteric ischemia failure to stent SMA , no plan for repeated trial   S/P 3 unit of blood transfusion   Stage D systolic heart failure S/P LVAD HM2   MH2 LVAD  with  TV Annuloplasty  Severe peripheral vascular disease   severe hyperglycemia on insulin coverage    Reglan 10 mg for Gastric Motility   hyperthyroidism on Methimazole 10mg TID   critical care polyneuropathy   Anemia of Acute blood Loss   severe protein caloric malnutrition   Chronic kidney disease stage III  Depressed and withdrawn   resume NG tube feeding   GI prophylaxis with PPI   weaning trial   Discussed with TCV team   critical care time is 35 minutes

## 2021-10-08 NOTE — PROGRESS NOTE ADULT - ASSESSMENT
67 YO M with a history of stage D NICM s/p HM2 on 9/2017 as DT (due to severe PAD) with TV ring, prior COVID-19 infection 4/2020, recurrent syncope post LVAD s/p ILR, and chronic abdominal pain with prior negative workup who was admitted 6/14/21 with symptomatic anemia with Hgb 4.5 in setting of INR 8.8 without hemodynamic instability and has since had a protracted hospitalization. He was transfused and underwent VCE which showed active bleeding in the mid small bowel but subsequent enteroscopy 6/15 did not reveal any active bleeding. He acutely decompensated after procedure with fever/hypertension, low flow alarms, and pulmonary infiltrate with hypoxia requiring intubation from probable aspiration PNA. He was unable to extubated and has since undergone tracheostomy but tolerating persistent trach collar and nearing ability for decannulation. 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 He underwent angiogram on 9/10, stent was unable to be placed and course was then complicated by RP bleed. He continued to have persistent leukocytosis and febrile episodes and was noted to have positive sputum culture for serratia marcescens and completed his course of abx. He was initially decannulated on 9/23. Recently he started having multiples of melena, emesis and went into acte respiratory distress and was ultimately re-intubated on 9/26.     He had blood cultures are positive for enterobacter cloacae/serratia, remains on IV antibiotics. Most recent cultures from 10/8 NGTD. Pressors have been weaned off and MAPs are at goal (70-80). He is s/p trach with ENT on 10/4. Overall prognosis remains poor. Ethics and palliative care following. Will need to discuss GOC with patient. Further discussion to be held on Monday 10/11.

## 2021-10-08 NOTE — CHART NOTE - NSCHARTNOTEFT_GEN_A_CORE
Cont current dose of methimazole and prednisone. Recheck FT4 and Total T3 tomorrow AM.     Kayce Bran DO

## 2021-10-08 NOTE — PROGRESS NOTE ADULT - SUBJECTIVE AND OBJECTIVE BOX
RICKY JOINT  MRN#: [id number]  Subjective:  Pulmonary progress  : recurrent Acute hypoxic respiratory Failure ,aspiration pneumonia, NICM  ,   65M PMH ACC/AHA stage D HF due to NICM HM2 LVAD , TV annuloplasty ring 17 as destination therapy due to severe peripheral artery disease with significant stenosis  SIADH, Depression, CKD-3 with hyperkalemia, past E. coli UTIs, driveline drainage (21) and COVID-19 (back in 2020)  He was recently seen in clinic where he complained of abdominal pain and dark stools w constipation back in May. He presents to [facility] ER today weakness and fatigue, moderate and + Black stools for three days, on coumadin secondary to warfarin use in the setting of an LVAD. Patient has required transfusions for GIB in the past. Mostly recently back in 2021 pt had anemia with dark stools. No interventions was done at that time. However Last Endoscopy was done in 2020 (negative). Today labs show patient is anemic with H/H of 4.5/16.3,. INR is 8.84 MAP in the 90s, Temp 35.1. He denies any chest pain, shortness of breath, dizziness, abd pain, nausea or vomiting. found to have  rectal bleeding underwent endoscopy ,old blood in the proximal ileum ,  develop sepsis with LL opacity given Antibiotics , Extubated , reintubated , Bronchoscopy on Zosyn for LL pneumonia  and Amiodarone S/P TV Annuloplasty , patient remain intubated on full ventilatory support .S/P multiple units of blood transfusion , remain on full ventilatory support on Precedex and propofol , new central IJ line , diarrhea C diff. +ve on po vancomycin and IV Flagyl,  mildly distended belly , fever start on cefepime 2gm q 8 hrs S/P tracheostomy .  new RT Subclavian central line continue on contact  isolation ,S/P  C-diff antibiotics, no more diarrhea back on full support mechanical ventilator , chest x ray show improvement in LLL air space disease, more awake and responsive on tube feeding no more diarrhea ,  no nausea or vomiting or diarrhea still very weak and tired , back on tube feeding ,still on po vancomycin , getting PT and OT at bed side ,   , no SOB getting stronger , improve muscle tone patient transfer to monitor bed still on contact isolation for C-Difficel colitis on 50% FI02, and change to Suresh  tube feeding still loose stool . H/H drop significantly require blood transfusion , most likely GI bleeding , IV heparin D/C ,  H/H is stable ., patient develop TR sided  pneumothorax require chest tube placement , RT IJ central line  placed , develop fever shaking chills , blood culture positive for serratia marcescens , start on cefepime .the patient  become hypoxic and hypotensive placed on full ventilatory support and Vasopressin , levophed and Dobutamine ,S/P blood transfusion on meropenem and vancomycin ,   , on and  off pressors , occasional agitation on Precedex .S/P IR cholecystostomy tube drainage placement in the RT upper Quadrant , resume anticoagulation chest x ray noted C-PAP trail lasted only for 2 hrs , new RT SC line and D/C RT IJ line , RT pig tail cathter has been removed , tolerating C-PAP trial placed on trach. collar 50% FI02 GI consultant noted on NG tube feeding as tolerated , develop AF RVR S/P  bolus Amiodarone  back to regular sinus Rhythm , Flat Affect depressed , back on tube feeding Vital AF at 60 cc/ hr .still intermittent abdominal pain , no fever saturation is accepted  back on full ventilatory support ,   on methimazole for hyperthyroidism ,  condition is the same ,still C/O Abdominal pain , white count is improving , no chest pain or SOB ,  .placed on Reglan 10 mg TID for gastric motility , depressed , withdrawn. , S/P  mesenteric angiogram , unable to stent SMA S/P 3 units of blood transfusion   RT IJ in place reassessed for stent in the SMA by vascular,  dark stool stable H/H ,surgical opinion for possible Lap cholecystectomy. over night events noted develop respiratory distress, , rectal bleeding, require intubation placed on full ventilatory support , FI02 is 50% also became hemianosmically unstable require triple pressor support with levophed , vasopressin and norsynephrine, pan culture placed  on Vancomycin IV and PO  and Zosyn, sedated with propofol kept NPO , new central line RT and left IJ cathter placed . Diflucan Added .fever of 38.5 weaned off  vasopressin , all culture are negative, resume tube feeding ,  white count is improving , on meropenem, vancomycin solution  , on Precedex, no over night events , S/P  tracheostomy , bleeding receiving blood transfusion on vasopressin , no more bleeding , no fever , more awake and responsive ,tolerating tube feeding , ID and heart failure follow up appreciated      (2021 16:57)    PAST MEDICAL & SURGICAL HISTORY:  CHF (congestive heart failure)    CAD (coronary artery disease)  Depression    Pleural effusion    History of 2019 novel coronavirus disease (COVID-19)  2020    Hemorrhoids    Bleeding hemorrhoids    Peripheral arterial disease    Claudication    BPH with urinary obstruction    ACC/AHA stage D systolic heart failure  Anticoagulation goal of INR 2.0 to 2.5    Falls    Clavicle fracture    CKD (chronic kidney disease), stage III    Iron deficiency anemia    H/O epistaxis    Vertigo    GI bleed    S/P TVR (tricuspid valve repair)    S/P ventricular assist device    S/P endoscopy    OBJECTIVE:  ICU Vital Signs Last 24 Hrs  T(C): 38.2 (2021 10:00), Max: 38.5 (2021 12:00)  T(F): 100.8 (2021 10:00), Max: 101.3 (2021 12:00)  HR: 65 (2021 10:00) (61 - 69)  BP: --  BP(mean): --  ABP: 105/67 (2021 10:00) (90/54 - 113/64)  ABP(mean): 77 (2021 10:00) (63 - 77)  RR: 20 (2021 10:00) (19 - 35)  SpO2: 99% (2021 10:00) (96% - 100%)       @ : @ 07:00  --------------------------------------------------------  IN: 2693.9 mL / OUT: 1415 mL / NET: 1278.9 mL     @ 07: @ 10:49  --------------------------------------------------------  IN: 420.8 mL / OUT: 115 mL / NET: 305.8 mL  PHYSICAL EXAM: Daily   Elderly male intubated on full ventilatory support FI02 is 40% S/P tracheostomy off   vasopressin , Precedex , blood  transfusion   Daily Weight in k.4 (2021 00:00)  HEENT:     + NCAT  + EOMI  - Conjuctival edema   - Icterus   - Thrush   - ETT  + NGT/OGT  Neck:         + FROM  RT IJ , LT IJ  lines JVD  - Nodes - Masses + Mid-line trachea + Tracheostomy  Chest:            normal A-P diameter    Lungs:          + CTA   + Rhonchi    - Rales    - Wheezing + Decreased  LT BS   - Dullness R L  Cardiac:       + S1 + S2    + RRR   - Irregular   - S3  - S4    - Murmurs   - Rub   - Hamman’s sign   Abdomen:    + BS  + Soft + Non-tender - Distended - Organomegaly - PEG .cholecystostomy tube in place  Extremities:   - Cyanosis U/L   - Clubbing  U/L  + LE/UE Edema   + Capillary refill    + Pulses   Neuro:        - Awake   -  Alert   - Confused   - Lethargic   + Sedated  + Generalized Weakness  Skin:        - Rashes    - Erythema   + Normal incisions   + IV sites intact          HOSPITAL MEDICATIONS: All medications reviewed and analyzed  MEDICATIONS  (STANDING):  amiodarone    Tablet 200 milliGRAM(s) Oral daily  chlorhexidine 0.12% Liquid 15 milliLiter(s) Oral Mucosa every 12 hours  chlorhexidine 2% Cloths 1 Application(s) Topical <User Schedule>  dexmedetomidine Infusion 0.5 MICROgram(s)/kG/Hr (9.81 mL/Hr) IV Continuous <Continuous>  dextrose 50% Injectable 50 milliLiter(s) IV Push every 15 minutes  heparin  Infusion 400 Unit(s)/Hr (12.5 mL/Hr) IV Continuous <Continuous>  Hydromorphone  Injectable 0.5 milliGRAM(s) IV Push once  insulin lispro (ADMELOG) corrective regimen sliding scale   SubCutaneous every 6 hours  pantoprazole  Injectable 40 milliGRAM(s) IV Push every 12 hours  piperacillin/tazobactam IVPB.. 3.375 Gram(s) IV Intermittent every 8 hours  propofol Infusion 20 MICROgram(s)/kG/Min (9.42 mL/Hr) IV Continuous <Continuous>  sodium chloride 0.9% lock flush 3 milliLiter(s) IV Push every 8 hours  sodium chloride 0.9%. 1000 milliLiter(s) (10 mL/Hr) IV Continuous <Continuous>    MEDICATIONS  (PRN):  acetaminophen    Suspension .. 650 milliGRAM(s) Oral every 6 hours PRN Temp greater or equal to 38C (100.4F)    LABS: All Lab data reviewed and analyzed                        9.9    7.76  )-----------( 180      ( 08 Oct 2021 00:43 )             31.0    10-08    134<L>  |  99  |  20  ----------------------------<  214<H>  4.4   |  19<L>  |  0.37<L>    Ca    10.4      08 Oct 2021 00:43  Phos  2.2     10-08  Mg     1.5     10-    TPro  8.3  /  Alb  4.4  /  TBili  1.2  /  DBili  x   /  AST  17  /  ALT  15  /  AlkPhos  167<H>  10-    Ca    10.3      07 Oct 2021 00:57  Phos  2.4     10-07  Mg     1.9     10-    TPro  8.4<H>  /  Alb  4.2  /  TBili  1.3<H>  /  DBili  x   /  AST  21  /  ALT  17  /  AlkPhos  191<H>  10-    Ca    9.8      06 Oct 2021 00:26  Phos  2.7     10-06  Mg     1.9     10-    TPro  7.8  /  Alb  4.0  /  TBili  1.6<H>  /  DBili  x   /  AST  18  /  ALT  20  /  AlkPhos  177<H>  10-                                                                                                                                                                        PTT - ( 2021 04:52 )  PTT:45.2 sec LIVER FUNCTIONS - ( 2021 00:42 )  Alb: 3.4 g/dL / Pro: 6.7 g/dL / ALK PHOS: 213 U/L / ALT: 15 U/L / AST: 24 U/L / GGT: x           RADIOLOGY: - Reviewed and analyzed RT Pig tail cathter  , LVAD HM2, CT scan of abdomen reviewed result noted

## 2021-10-08 NOTE — PROGRESS NOTE ADULT - ASSESSMENT
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 coli UTIs, driveline drainage (1/7/21) and COVID-19 (back in April 2020)  He was recently seen in clinic where he complained of abdominal pain and dark stools w constipation back in May. He presents to [facility] ER today weakness and fatigue, moderate and + Black stools for three days, on coumadin secondary to warfarin use in the setting of an LVAD. Patient has required transfusions for GIB in the past. Mostly recently back in jan 2021 pt had anemia with dark stools. No interventions was done at that time. However Last Endoscopy was done in July 2020 (negative). Today labs show patient is anemic with H/H of 4.5/16.3,. INR is 8.84 MAP in the 90s,   Returned from endoscopy appearing ill tachypneic with chills with new white out of his left lung      A/p  s/p LVAD placement  admission prolonged following GI bleeding, aspiration event, CDI, VAP, chronic abdominal pain, most recently with retroperitoneal bleeding post attempted stent placement  Acute event while on the floor with possible aspiration and required re-intubation and transfer to cTU  Antibiotics with Meropenem  serratia in blood culture on 9/30  and enterobacter in blood culture 10/3 likely from a GI source given the multiple GNRs grown but CT scan without evidence of a specific source raises the possiblilty of GI translocation in the setting on ongoing ischemic bowel?  Continue Meropenem  plan 2 week course (until 10/16)  repeat blood cutlures sent and no growth    Morris Prater MD  [phone]  After 5pm/weekends [phone]

## 2021-10-08 NOTE — CONSULT NOTE ADULT - ASSESSMENT
Recommendation:   The medical team should make every effort to ensure Mr. Quispe fully understands the indications, risks, benefits, and alternatives of the procedures in question by speaking to him via a Creole  and in language he understands.   However, EVEN IN A PATIENT THAT MAY NOT HAVE CAPACITY FOR A SPECIFIC DECISION, HE HAS THE RIGHT TO REFUSE THE INTERVENTION. In those cases, unless the procedure is EMERGENT (TO SAVE LIFE OR LIMB), we CANNOT PROCEED IF THE PATIENT IS REFUSING. In a patient who is deemed to lack capacity, if the procedure is non-emergent and the patient is actively refusing we may need to utilize legal intervention if necessary.    ETHICS SERVICE WILL REMAIN AVAILABLE FOR FURTHER CONVERSATION ABOUT THE PATIENT’S CURRENT CONDITION AND DECISION-POINTS THAT MAY OCCUR.     CASE DISCUSSED with ATTENDINGS: Matt Duffy DrPH, [facility] and Jaclyn Green MS, MD, MPH, [facility]-.  More than 50% of the time of this consultation was spent in coordination of Care of Patient.  REFERENCES  (i)	Axel TL & Mendoza JF. Principles of Biomedical Ethics. [place]: [employer]; 2019.    (ii)	MARIBEL Oden. Assessment of patients’ competence to consent to treatment. NEJM. 2007; 357:4680-8082.  (iii) Lorraine MIRANDA. Deciding life and death in the courtroom. From [place] to Vivian, Deloris, and Taras – a brief history and analysis of constitutional protection of the ‘right to die’. DARIN. 1997;278(18):4949-2052 pmid:[id number].  (iv) Philippe GUPTA. Informed consent—It's more than a signature on a piece of paper. The American Journal of Surgery. 2017 Dec 1;214(6):993-7.  (v) Kenny EK, Fady H, et al. Surviving Surrogate Decision-Making: What Helps and Hampers the Experience of Making Medical Decisions for Others. Soc Gen Int Med 2007;22:6482-3761.

## 2021-10-08 NOTE — PROGRESS NOTE ADULT - SUBJECTIVE AND OBJECTIVE BOX
Subjective: Patient seen and examined resting in bed.     Medications:  acetaminophen    Suspension .. 650 milliGRAM(s) Enteral Tube every 6 hours PRN  chlorhexidine 0.12% Liquid 15 milliLiter(s) Oral Mucosa two times a day  chlorhexidine 2% Cloths 1 Application(s) Topical <User Schedule>  fentaNYL    Injectable 12.5 MICROGram(s) IV Push every 3 hours PRN  insulin lispro (ADMELOG) corrective regimen sliding scale   SubCutaneous every 6 hours  meropenem  IVPB 1000 milliGRAM(s) IV Intermittent every 8 hours  methimazole 20 milliGRAM(s) Oral every 8 hours  metoclopramide Injectable 10 milliGRAM(s) IV Push every 8 hours  multivitamin 1 Tablet(s) Oral daily  pantoprazole  Injectable 40 milliGRAM(s) IV Push every 12 hours  predniSONE   Tablet 40 milliGRAM(s) Oral daily  simethicone 80 milliGRAM(s) Chew every 8 hours PRN  sodium chloride 0.9%. 1000 milliLiter(s) IV Continuous <Continuous>  spironolactone 12.5 milliGRAM(s) Oral every 12 hours  thiamine 100 milliGRAM(s) Oral daily  vancomycin    Solution 125 milliGRAM(s) Oral every 12 hours      ICU Vital Signs Last 24 Hrs  T(C): 36.3 (08 Oct 2021 12:00), Max: 36.8 (07 Oct 2021 16:00)  T(F): 97.3 (08 Oct 2021 12:00), Max: 98.2 (07 Oct 2021 16:00)  HR: 113 (08 Oct 2021 15:00) (70 - 113)  BP: --  BP(mean): --  ABP: 115/72 (08 Oct 2021 15:00) (84/65 - 345/345)  ABP(mean): 88 (08 Oct 2021 15:00) (73 - 345)  RR: 23 (08 Oct 2021 15:00) (20 - 26)  SpO2: 100% (08 Oct 2021 15:00) (99% - 100%)    Mode: CPAP with PS  FiO2: 40  PEEP: 5  PS: 10  ITime: 1  MAP: 11  PIP: 24          I&O's Summary    07 Oct 2021 07:01  -  08 Oct 2021 07:00  --------------------------------------------------------  IN: 2011.7 mL / OUT: 420 mL / NET: 1591.7 mL    08 Oct 2021 07:01  -  08 Oct 2021 15:43  --------------------------------------------------------  IN: 452.4 mL / OUT: 1100 mL / NET: -647.6 mL        Physical Exam  General: No distress.   Neck: JVP not appreciated   Chest: +vent sounds b/l   CV: +VAD hum  Abdomen: Soft, non-distended, non-tender, +keotube   Skin: No rashes or skin breakdown  Neurology: Alert and oriented times three    LVAD Interrogation  VAD TYPE HM 2  Speed 9200  Flow 5.5  Power 5.8  PI 6  Assessment of driveline exit site: driveline dressing c/d/i  Programming changes: no changes made    Labs:                        9.9    7.76  )-----------( 180      ( 08 Oct 2021 00:43 )             31.0     10-08    134<L>  |  99  |  20  ----------------------------<  214<H>  4.4   |  19<L>  |  0.37<L>    Ca    10.4      08 Oct 2021 00:43  Phos  2.2     10-08  Mg     1.5     10-08    TPro  8.3  /  Alb  4.4  /  TBili  1.2  /  DBili  x   /  AST  17  /  ALT  15  /  AlkPhos  167<H>  10-08              Lactate Dehydrogenase, Serum: 263 U/L (10-08 @ 00:43)  Lactate Dehydrogenase, Serum: 275 U/L (10-07 @ 00:57)  Lactate Dehydrogenase, Serum: 204 U/L (10-06 @ 00:26)

## 2021-10-08 NOTE — CONSULT NOTE ADULT - SUBJECTIVE AND OBJECTIVE BOX
Consult requested by: Dr. Christopher Pierce   Service: Heart Failure Attending    Primary MD: JAMES Cadet (CT Surgery Attending)  Consultant: Adina Schwab (Ethics Fellow) and Jaclyn Green (Ethicist) [phone] or (O) [phone]  Consult purpose:  Assist the team in the ethical dilemma posed by a 66 year old male with complex medical history and prolonged hospitalization (almost 4 months) who is refusing procedures.     Clinical summary (SEE INITIAL CONSULT 8/19/21): This is a 66 year old male with a PMH ACC/AHA stage D systolic HF due to non-ischemic cardiomyopathy, s/p HM2 LVAD, tricuspid valve annuloplasty ring 9/12/17 as destination therapy due to severe peripheral artery disease with significant stenosis, bleeding hemorrhoids, BPH with urinary obstruction, coronary artery disease, claudication, clavicle fracture, falls, GI bleed, epistaxis, iron deficiency anemia, pleural effusion, vertigo, SIADH, Depression, CKD-3 with hyperkalemia, past E. coli UTIs, driveline drainage (1/7/21) and COVID-19 (April 2020). He presented on 6/14/21 to [facility] ER for weakness and fatigue, positive black stools for three days, on coumadin in the setting of an LVAD.     Overall course complicated by recurrent infections and sepsis, cardiogenic shock, hemodynamic instability, and acute hypoxemic respiratory failure. Patient is status post diagnostic mesenteric angiogram with unsuccessful attempt to stent the SMA on 9/10. Post procedure course was complicated by retroperitoneal bleed. On 9/24 Trach was decannulated however on 9/26 patient was intubated for respiratory distress. He had a left sided multifocal pneumonia and completed antibiotic course. On 9/29/21, Family Meeting with patient’s sons (Vega and Vignesh), sister Alyssa (his HCP) with interdisciplinary team regarding goals of care. As per Palliative Care’s notes, it was decided that the plan would be to attempt to wean him off sedation to see if he could make his own decision. On 9/30 TTE showed EF 25%, decreased right ventricular systolic function and severe global left ventricular systolic dysfunction, and moderate mitral valve regurgitation.  On 10/1/21, as per Palliative Care, patient stated he wants the ET tube out and it was explained that he needs a tracheostomy, similar to the one in the past. Palliative noted that at first patient was hesitant, but subsequently consented. Also noted that patient was assessed for capacity for this decision. On 10/4, patient underwent tracheostomy procedure. On 10/5 CT abdomen showed no intra-abdominal abscess, but severe stenosis of proximal SMA and bilateral iliac arteries. As per notes, SMA stenting was discussed with patient and he refused procedure. Per notes, Behavioral Health to assess capacity for medical decision regarding SMA stent placement. Heart Failure/Transplant, Pulmonary, Endocrine, Behavioral Health, GI, ENT, ID, IR, Vascular Surgery, SLP and Palliative Care have been on board.     Continuation of consult (initial on 8/19/2021) to further clarify goals of care as patient is refusing interventions.    Prognosis Estimate (survival in hrs, days, wks, mos, yrs): poor  Patient Decision-Making Capacity:  Has limited capacity Lacks capacity  Patient Aware of:  Diagnosis:   Yes    No   Unknown    Prognosis:   Yes    No   Unknown       Name of medical decision-maker should patient lack capacity: Cristina Rudolph, [phone]  Other stakeholders: Celestina, Niece; Miller, Nephew; Vega, Son; Vignesh, Son   Other Decision-Maker (i.e., [facility] or Surrogate) Aware of:  Diagnosis: Yes   No  Unknown      Prognosis:   Yes    No    Unknown   Evidence of Patient’s Preference of Life-Sustaining Treatment (Written or Oral): none  Resuscitation status:   DNR:  Yes   No      DNI: Yes   No       DISCUSSIONS:  Discussion with Adina Schwab (Ethics Fellow) and patient on 10/6/21: I spoke with patient today and based on my conversation, I believe he does have capacity, although it is limited at this point due to lack of knowledge. He was awake and conversive but due to tracheostomy was unable to verbalize. We communicated using yes and no questions to which he could shake his head, or use a motion response. He was able to tell me his name and date of birth, the month and year, which hospital he was in and the president. We discussed briefly regarding the SMA stent and its intended outcome. He stated he didn’t want the procedure done. He was able to tell me if he doesn't do the procedure he will die, but he was not able to tell me the indications for the procedure or how the procedure is done and what the potential benefits and risks are of the procedure. I asked him if he would like someone to explain this to him and he said he would like someone to explain it to him so he can understand it, and requested it be done in his native language, Creole.    Discussion between A. Schwab (Ethics Fellow) and Dr. Pierce (Heart Failure Attending) on 10/6/21: I spoke with Dr. Pierce and discussed her concerns of his refusal of interventions (PEG/PEJ tube, SMA stent, and cholecystectomy tube - although this is not so relevant at this point). We discussed that the medical team should make every effort to ensure he fully understands the indications, risks, benefits, and alternatives of the procedures in question by speaking to him via a Creole , a language he understands. Once that is done Ethics will re-assess any further issues of refusal, capacity, etc.    Bioethics analysis:   The central ethical issue that exists in this case is (A) the patient’s autonomy versus (B) the providers’ desire for beneficence and non-maleficence.   The principle of respect for autonomous persons acknowledges a patient’s right to hold views, make choices, and take actions based on their values and beliefs(i). Furthermore, this principle recognizes the patient’s dignity and bodily integrity(i). Essential to this principle is the capacity for autonomous choice(i). In other words, the patient’s ability to decide what can and cannot be done to him according to his set of values.    Capacity is assessed using, among other tests, the CUAR test(ii). It requires the patient to (1) be able to communicate a choice (communication); (2) comprehend the facts, management, and alternatives about his condition (understanding); (3) acknowledge his medical condition and likely consequences of treatment options (appreciation); and (4) engage in a rational process of manipulating the relevant information (reasoning). If a patient lacks any of these four criteria, we should utilize the appropriate colleen of the patient’s autonomy (The patient’s sister and HCP). That way, we preserve the respect for the autonomous patient and protect him from the consequences of a decision resulting in harm. Of note, capacity is decision specific, and it can wax and wane over time depending on factors such as mood, medical condition, nutritional status, and delirium. Therefore, practitioners should continuously optimize a patient’s capacity, to the extent possible return them to a baseline capacity, to help them make medical decisions for themselves. They should be allowed to make their own health care decisions if they can do so.    Mr. Quispe’s innate moral status leads the health care team to show respect for him as a person with dignity. In this case, Mr. Quispe likely has capacity, however to ensure he fully understands the indications, risks, benefits, and alternatives of the procedures in question, the medical team should speak to him via a Creole  and in simple language he understands, trying to gauge his understanding via feedback in a way he can communicate (yes or no questions, images he can point to, utilizing motions etc). The patient’s autonomy is best preserved through informed consent, or informed refusal, by the autonomous patient with capacity. In the [place], adults with decision-making capacity (i.e., the ability to understand the consequences of their decisions) have the right to refuse medical therapy, even if the therapy is thought to be life-saving.(iii)  A medical team may challenge a refusal only if they think that the patient does not understand the therapy, alternatives, or consequences of refusal or is otherwise impaired (eg, suicidal or depressed). Importantly, a patient may not have capacity for a decision and in that case we look to the HCP/surrogate. However, even though a patient may not have capacity for a specific decision, he has the right to refuse the intervention. In those cases, unless the procedure is Emergent (to save life or limb), we CANNOT PROCEED IF THE PATIENT IS REFUSING.    Informed consent is an ethical concept that is codified in the law and is in daily practice at every health care institution. Three fundamental criteria are needed for clinical informed consent: the patient must have capacity, adequately informed and not coerced. Physician-patient interaction is rooted in the ethical concept of beneficence, but over the 19th and 20th centuries, case law and societal changes brought respect for autonomy and with it--informed consent. They must have the capacity to be able to understand and assess the information given, communicate their choices and understand the consequences of their decision. The physician must provide adequate information, with a minimum being the diagnosis, the procedure with its risks, benefits, and the alternatives, along with their benefits and risks—including doing nothing. The decision must be voluntary.(iv)    Today, communication issues are the most frequent root cause of serious adverse events. It's not surprising since communication is probably one of the hardest things that we do and especially to do right. There are a multitude of barriers to understanding when attempting to obtain a truly informed consent. There may be ineffective provider-patient communication because of language differences, or perhaps because of a lack of health literacy or cultural issues. It may also be due to physician's reluctance to use a shared decision-making technique.(iv)  Language is one of the most common barriers between patients and physicians. The Federal Government through Title VI of the Civil Rights Act of 1964 requires interpretation and translation services. Culturally and Linguistically Appropriate Services in health care is a national standard from Department of Health and Human Services that requires language assistance regardless of the number of language speakers in the community and at all points of contact and at all hours of operation. Cultural issues may also be a barrier. Informed consent is more than a signature on a legal document. It requires a process of communication to truly provide and achieve an informed consent. The trust that is inherent in the bond between physician and patient is fragile and must be carefully protected.(iv)  Communication is central in the practitioner-patient relationship(v). This is not only owed to patients. It also extends to a patient’s health care proxy when a patient loses capacity. Sometimes patients can create several concerns for the medical team. However, patients are sometimes thrown to a difficult situation and can bring their fears and anxieties to an already delicate situation. Understanding the root of the problem can help diffuse these difficult moments. Practical tools such as clear, open, honest, compassionate, and respectful communication and active listening can help in this regard and build trust, a necessity in this vulnerable population. The key feature of a goals of care conversation is identifying "what matters to the patient" — i.e., the patient’s values, wishes and preferences. This also pertains to the patient’s surrogate decision maker when the patient lacks capacity.

## 2021-10-08 NOTE — PROGRESS NOTE ADULT - PROBLEM SELECTOR PLAN 7
- Has been transfused multiple times throughout this admission, last transfusion was on 10/5  - CBC daily

## 2021-10-08 NOTE — CHART NOTE - NSCHARTNOTEFT_GEN_A_CORE
Nutrition Follow Up Note  Patient seen for: Malnutrition follow-up    Chart reviewed, events noted. 66M, PMH ACC/AHA stage D HF 2/2 NICM s/p HM2 LVAD (2017). Here initially for GIB prolonged hospital course, s/p tracheostomy, acalculous cholecystitis s/p perc dawn. Patient c persistent intermittent abdominal pain - found with SMA stenosis on mesenteric angiogram on 9/10, however, unable to place stent. CTA Abd/Pelvis () reveals L RP hematoma. Was tolerating TC and decannulated , transferred to [place] , had multiple episodes of vomiting and went into acute respiratory distress to which he was subsequently intubated. CT Scan  showed multifocal PNA; recultured on  and positive enterobacter cloacae - on abx. S/p trach placement 10/4. Vascular re-consulted regarding SMA stenosis stenting - pt refusing procedure - ethics &  following as well as Palliative for GOC    Source: EMR, Team    Diet, NPO with Tube Feed:   Tube Feeding Modality: Nasogastric  Vital 1.5 Tank (VITAL1.5RTH)  Total Volume for 24 Hours (mL): 960  Continuous  Starting Tube Feed Rate {mL per Hour}: 20  Increase Tube Feed Rate by (mL): 5     Every 6 hours  Until Goal Tube Feed Rate (mL per Hour): 40  Tube Feed Duration (in Hours): 24  Tube Feed Start Time: 10:00 (21 @ 10:11)    EN order provides: 1440kcals, 65g protein, 733ml free H2O  * Team aware goal is 65ml/hr, however, pt has not been able to advance to goal of 40    Is EN order appropriate? No - Previously ordered for Vital 1.5 with goal rate of 65ml/hr x 24hrs, which met 100% nutritional needs    EN Currently infusing @ 20ml/hr  EN provisions (per flowsheets):     (10/7) 260ml formula - 27% ordered EN goal volume achieved     (10/6) 300ml formula - 31% ordered EN goal volume achieved     (10/5) 610ml formula - 64% ordered EN goal volume achieved     (10/4) 80ml formula - 8% ordered EN goal volume achieved     (10/3) 960ml formula - 100% ordered EN goal volume achieved    Nutrition-related concerns:  - Cholecystostomy tube - general surgery prefers chronic tube management rather than gallbladder removal  - Multivitamin and thiamin supplementation ordered daily  - Insulin Sliding Scale ordered to optimize BG; receiving steroids     GI:  Last BM 10/7.   Bowel Regimen? [] Yes   [x] No  - Emesis noted 10/6  - Noted on abx course at this time    Weight in k.3 (10-07), 59.2 (10-06), 62.1 (10-05), 59.8 (10-04), 59.8 (10-04), 69.8 (10-03), 62.7 (10-02), 60 (10-01), 61.9 (), 67.8 (), 62.1 (), 58.1 (), 65.7 (09-15), 58.4 (), 66.2 (), 71.2 ()    Weight history:   - Admission weight: 176.3 pounds / 80 kg (6/15)    MEDICATIONS  (STANDING):  insulin lispro (ADMELOG) corrective regimen sliding scale  meropenem  IVPB  methimazole  multivitamin  pantoprazole  Injectable  predniSONE   Tablet  sodium chloride 0.9%.  spironolactone  thiamine  vancomycin    Solution    Pertinent Labs: 10-08 @ 00:43: Na 134<L>, BUN 20, Cr 0.37<L>, <H>, K+ 4.4, Phos 2.2<L>, Mg 1.5<L>, Alk Phos 167<H>, ALT/SGPT 15, AST/SGOT 17, HbA1c --    A1C with Estimated Average Glucose Result: 5.4 % (08-15-21 @ 13:52)  A1C with Estimated Average Glucose Result: 5.6 % (06-15-21 @ 02:42)    Finger Sticks:  POCT Blood Glucose.: 121 mg/dL (10-07 @ 18:21)  POCT Blood Glucose.: 114 mg/dL (10-07 @ 12:14)    Skin per nursing documentation: no pressure injuries   Edema: none at this time     Estimated Nutrition Needs:   Using dosing weight 78.5 kg  Energy Needs (25-30 kcal/kg): 7535-8444 kcal/day  Protein Needs (1.2-1.4 g/kg):     Previous Nutrition Diagnosis: Severe chronic malnutrition  Nutrition diagnosis is ongoing & addressed with tube feeds as tolerated    No new nutrition diagnosis at this time    Recommendations:    1. Recommend as medically feasible, increase Vital 1.5 as tolerated to goal rate of 65ml/hr x 24hrs. At goal to provide 1560ml formula, 2340kcals, 105g protein, 1192ml free H2O (meets ~30kcals/kg & 1.33g protein/kg based on dosing wt 78.5kg).  - May consider alternative means of nutrition/hydration pending within GOC  2. Continue micronutrient supplementation as ordered  3. RD to remain available to adjust EN formulary, volume/rate PRN.     Monitoring and Evaluation:   Continue to monitor Nutritional intake, Tolerance to diet prescription, weights, labs, skin integrity  RD remains available upon request and will follow up per protocol     Wendy Travis, MS, RD, CDN, [facility]  pager #[phone] Nutrition Follow Up Note  Patient seen for: Malnutrition follow-up    Chart reviewed, events noted. 66M, PMH ACC/AHA stage D HF 2/2 NICM s/p HM2 LVAD (2017). Here initially for GIB prolonged hospital course, s/p tracheostomy, acalculous cholecystitis s/p perc dawn. Patient c persistent intermittent abdominal pain - found with SMA stenosis on mesenteric angiogram on 9/10, however, unable to place stent. CTA Abd/Pelvis () reveals L RP hematoma. Was tolerating TC and decannulated , transferred [place] , had multiple episodes of vomiting and went into acute respiratory distress to which he was subsequently intubated. CT Scan  showed multifocal PNA; recultured on  and positive enterobacter cloacae - on abx. S/p trach placement 10/4. Vascular re-consulted regarding SMA stenosis stenting - pt refusing procedure - ethics &  following as well as Palliative for GOC    Source: EMR, Team    Diet, NPO with Tube Feed:   Tube Feeding Modality: Nasogastric  Vital 1.5 Tank (VITAL1.5RTH)  Total Volume for 24 Hours (mL): 960  Continuous  Starting Tube Feed Rate {mL per Hour}: 20  Increase Tube Feed Rate by (mL): 5     Every 6 hours  Until Goal Tube Feed Rate (mL per Hour): 40  Tube Feed Duration (in Hours): 24  Tube Feed Start Time: 10:00 (21 @ 10:11)    EN order provides: 1440kcals, 65g protein, 733ml free H2O  * Team aware goal is 65ml/hr, however, pt has not been able to advance to goal of 40    Is EN order appropriate? No - Previously ordered for Vital 1.5 with goal rate of 65ml/hr x 24hrs, which met 100% nutritional needs    EN Currently infusing @ 20ml/hr  EN provisions (per flowsheets):     (10/7) 260ml formula - 27% ordered EN goal volume achieved     (10/6) 300ml formula - 31% ordered EN goal volume achieved     (10/5) 610ml formula - 64% ordered EN goal volume achieved     (10/4) 80ml formula - 8% ordered EN goal volume achieved     (10/3) 960ml formula - 100% ordered EN goal volume achieved    Nutrition-related concerns:  - Cholecystostomy tube - general surgery prefers chronic tube management rather than gallbladder removal  - Multivitamin and thiamin supplementation ordered daily  - Insulin Sliding Scale ordered to optimize BG; receiving steroids     GI:  Last BM 10/7.   Bowel Regimen? [] Yes   [x] No  - Emesis noted 10/6  - Noted on abx course at this time    Weight in k.3 (10-07), 59.2 (10-06), 62.1 (10-05), 59.8 (10-04), 59.8 (10-04), 69.8 (10-03), 62.7 (10-02), 60 (10-01), 61.9 (), 67.8 (), 62.1 (), 58.1 (), 65.7 (09-15), 58.4 (), 66.2 (), 71.2 ()    Weight history:   - Admission weight: 176.3 pounds / 80 kg (6/15)    MEDICATIONS  (STANDING):  insulin lispro (ADMELOG) corrective regimen sliding scale  meropenem  IVPB  methimazole  multivitamin  pantoprazole  Injectable  predniSONE   Tablet  sodium chloride 0.9%.  spironolactone  thiamine  vancomycin    Solution    Pertinent Labs: 10-08 @ 00:43: Na 134<L>, BUN 20, Cr 0.37<L>, <H>, K+ 4.4, Phos 2.2<L>, Mg 1.5<L>, Alk Phos 167<H>, ALT/SGPT 15, AST/SGOT 17, HbA1c --    A1C with Estimated Average Glucose Result: 5.4 % (08-15-21 @ 13:52)  A1C with Estimated Average Glucose Result: 5.6 % (06-15-21 @ 02:42)    Finger Sticks:  POCT Blood Glucose.: 121 mg/dL (10-07 @ 18:21)  POCT Blood Glucose.: 114 mg/dL (10-07 @ 12:14)    Skin per nursing documentation: no pressure injuries   Edema: none at this time     Estimated Nutrition Needs:   Using dosing weight 78.5 kg  Energy Needs (25-30 kcal/kg): 2485-4420 kcal/day  Protein Needs (1.2-1.4 g/kg):     Previous Nutrition Diagnosis: Severe chronic malnutrition  Nutrition diagnosis is ongoing & addressed with tube feeds as tolerated    No new nutrition diagnosis at this time    Recommendations:    1. Recommend as medically feasible, increase Vital 1.5 as tolerated to goal rate of 65ml/hr x 24hrs. At goal to provide 1560ml formula, 2340kcals, 105g protein, 1192ml free H2O (meets ~30kcals/kg & 1.33g protein/kg based on dosing wt 78.5kg).  - May consider alternative means of nutrition/hydration pending within GOC  2. Continue micronutrient supplementation as ordered  3. Monitor electrolytes and replenish PRN  4. RD to remain available to adjust EN formulary, volume/rate PRN.     Monitoring and Evaluation:   Continue to monitor Nutritional intake, Tolerance to diet prescription, weights, labs, skin integrity  RD remains available upon request and will follow up per protocol     Wendy Travis, MS, RD, CDN, [facility]  pager #[phone]

## 2021-10-08 NOTE — PROGRESS NOTE ADULT - PROBLEM SELECTOR PLAN 3
- Vascular surgery following, may reattempt via subclavian approach  - Patient aware of risk and benefits. Will have further discussion on Monday about possibly going to OR. If patient refuses will need to discuss further GOC

## 2021-10-08 NOTE — PROGRESS NOTE ADULT - SUBJECTIVE AND OBJECTIVE BOX
ENT ISSUE/POD: POD #4 for trach     HPI: 67 y/o male POD #4 for tracheostomy #6 DCT cuffed in place 2/2 respiratory failure. Pt is doing well on the vent. no reported issues.       PAST MEDICAL & SURGICAL HISTORY:  CHF (congestive heart failure)    CAD (coronary artery disease)    Depression    Pleural effusion    History of 2019 novel coronavirus disease (COVID-19)  april 2020    Hemorrhoids    Bleeding hemorrhoids    Peripheral arterial disease    Claudication    BPH with urinary obstruction    ACC/AHA stage D systolic heart failure    Anticoagulation goal of INR 2.0 to 2.5    Falls    Clavicle fracture    CKD (chronic kidney disease), stage III    Iron deficiency anemia    H/O epistaxis    Vertigo    GI bleed    S/P TVR (tricuspid valve repair)    S/P ventricular assist device    S/P endoscopy      Allergies    Amiodarone Hydrochloride (Other (Severe))    Intolerances      MEDICATIONS  (STANDING):  chlorhexidine 0.12% Liquid 15 milliLiter(s) Oral Mucosa two times a day  chlorhexidine 2% Cloths 1 Application(s) Topical <User Schedule>  dexMEDEtomidine Infusion 1.5 MICROgram(s)/kG/Hr (23.1 mL/Hr) IV Continuous <Continuous>  insulin lispro (ADMELOG) corrective regimen sliding scale   SubCutaneous every 6 hours  meropenem  IVPB 1000 milliGRAM(s) IV Intermittent every 8 hours  methimazole 20 milliGRAM(s) Oral every 8 hours  metoclopramide Injectable 10 milliGRAM(s) IV Push every 8 hours  multivitamin 1 Tablet(s) Oral daily  pantoprazole  Injectable 40 milliGRAM(s) IV Push every 12 hours  predniSONE   Tablet 40 milliGRAM(s) Oral daily  propofol Infusion 13.528 MICROgram(s)/kG/Min (5 mL/Hr) IV Continuous <Continuous>  sodium chloride 0.9%. 1000 milliLiter(s) (10 mL/Hr) IV Continuous <Continuous>  spironolactone 12.5 milliGRAM(s) Oral every 12 hours  thiamine 100 milliGRAM(s) Oral daily  vancomycin    Solution 125 milliGRAM(s) Oral every 12 hours  vasopressin Infusion 0.017 Unit(s)/Min (1 mL/Hr) IV Continuous <Continuous>    MEDICATIONS  (PRN):  acetaminophen    Suspension .. 650 milliGRAM(s) Enteral Tube every 6 hours PRN Mild Pain (1 - 3)  fentaNYL    Injectable 12.5 MICROGram(s) IV Push every 3 hours PRN Moderate to severe pain  simethicone 80 milliGRAM(s) Chew every 8 hours PRN Gas    ROS:   Unable to obtain due to pts clinical condition       Vital Signs Last 24 Hrs  T(C): 36.7 (08 Oct 2021 04:00), Max: 36.8 (07 Oct 2021 16:00)  T(F): 98.1 (08 Oct 2021 04:00), Max: 98.2 (07 Oct 2021 16:00)  HR: 83 (08 Oct 2021 08:13) (70 - 120)  BP: --  BP(mean): --  RR: 21 (07 Oct 2021 17:00) (15 - 42)  SpO2: 100% (08 Oct 2021 08:13) (98% - 100%)                          9.9    7.76  )-----------( 180      ( 08 Oct 2021 00:43 )             31.0    10-08    134<L>  |  99  |  20  ----------------------------<  214<H>  4.4   |  19<L>  |  0.37<L>    Ca    10.4      08 Oct 2021 00:43  Phos  2.2     10-08  Mg     1.5     10-08    TPro  8.3  /  Alb  4.4  /  TBili  1.2  /  DBili  x   /  AST  17  /  ALT  15  /  AlkPhos  167<H>  10-08     PHYSICAL EXAM:  Gen: NAD, well-developed  Head: Normocephalic, Atraumatic  Face: no edema/erythema/fluctuance  Eyes:  no scleral injection  Nose: Nares bilaterally patent, no discharge  Mouth: No Stridor / Drooling / Trismus.  Mucosa moist, tongue/uvula midline, oropharynx clear  Neck: #6 DCT inflated cuff with minimal serosanguinous oozing from stoma, no active bleeding, sutures x4 and umbilical tie in place.  No lymphadenopathy, trachea midline, no masses  Resp: ventilating well

## 2021-10-08 NOTE — PROGRESS NOTE ADULT - SUBJECTIVE AND OBJECTIVE BOX
RICKY HAMPTON  MRN-[id number]  Patient is a 66y old  Male who presents with a chief complaint of Anemia, Supratherapeutic INR, Dark Stools (07 Oct 2021 07:48)    HPI:  65M PMH ACC/AHA stage D HF due to NICM HM2 LVAD , TV annuloplasty ring 17 as destination therapy due to severe peripheral artery disease with significant stenosis  SIADH, Depression, CKD-3 with hyperkalemia, past E. coli UTIs, driveline drainage (21) and COVID-19 (back in 2020)  He was recently seen in clinic where he complained of abdominal pain and dark stools w constipation back in May. He presents to [facility] ER today weakness and fatigue, moderate and + Black stools for three days, on coumadin secondary to warfarin use in the setting of an LVAD. Patient has required transfusions for GIB in the past. Mostly recently back in 2021 pt had anemia with dark stools. No interventions was done at that time. However Last Endoscopy was done in 2020 (negative). Today labs show patient is anemic with H/H of 4.5/16.3,. INR is 8.84 MAP in the 90s, Temp 35.1. He denies any chest pain, shortness of breath, dizziness, abd pain, nausea or vomiting.       (2021 16:57)      Surgery/Hospital Course:   admit for melena w/ anemia, INR 8.84   6/15 Capsul study (+) for small bowel bleed, balloon endoscopy (old blood in prox ileum); post EGD - septic w/ L opacity, re-intubated for concern for aspiration, TTE (Mod MR, decrease biV w/ interventricular septum boweing towards R)   bronch    +C Diff    CT C/A/P: Fluid filled colon which may be 2/2 rapid transit. Small bilateral pleffs with associates. Compressive atelectasis New ISABELLE & LLL  parenchymal opacities, suspicious for pneumonia. Moderate stenosis in the proximal superior mesenteric artery.    #8 Shiley trach at bedside    LVAD speed increased to 9200   Bronch   TC since . Patient transferred to SDU.    INR today 2.64.  H&H 7.3/24 this AM.  Will repeat CBC at noon, and will send stool guaiac Patient with persistent abdominal tenderness, rate of tube feeds decreased.  No nausea/vomiting.     INR today 2.4. H&H 9.1/28.6 low flow overnight /N&V, refusing Tube feeds on D5 1/2 normal  @50 cc/hr   INR 2.69  H&H 7.7/.1 refusing Tube feeds on D5 1/2 normal  @50 cc/hr. This am + BM Melena Dr Oneill HF  aware- PRBC x1  GI team consulted -  NPO  plan on study in am-  D/w Dr Cadet Patient  to return to CTU for further management; 1PRBCS    Post op INR 2.2 today.  No bleeding. BC + for SM.  Pt is hypotensive requiring pressor and inotropic support.  ID follow up today on Cefepime will follow.   R PTC for PTX    CT C/A/P: sub q emphysema in R chest wall, GGO RUL, small ascites CTH negative; Abd US: GB thickening, pericholecystic fluid     Perchole drain in place continues to drain total output overnight 133.  Fever today 38.8    duplex LE negative    Patient with persistent abdominal pain, refusing tube feeds and medications, Psych consulted   CTA A/P ordered to r/o mesenteric ischemia 2/2 persistent anorexia, nausea, vomiting. Revealed:  Evaluation of the mesenteric vessels is limited by streak artifact from LVAD. There appears to be severe stenosis of the proximal SMA; abdominal mesenteric doppler is recommended for further evaluation. 2.  No small bowel findings to suggest acute mesenteric ischemia. 3.  Focal dissections involving the right and left common iliac arteries.  8/15: Cultures resulted BC 1/2 +GPC in clusters, SC enterobacter; mesenteric duplex: borderline stenosis of proximal SMA  : CT C/A/P noncon: Nondular opacities in R lung apex w/cavitation, abd nl  :  Continue current care, treatment of thyrotoxicosis with medications as per endocrine, d/c ABX as per team.    RUQ sono: Contracted gallbladder with cholecystostomy tube in place.  9/10 failed SMA stent, L fem PSA, s/p 3U PRCB   CTA Abd/Pelvis L RP hematoma    Trach decannulated    intubated fro resp distress for increased WOB, LL pneumonia; CT C/A/P: progressive ISABELLE opacities and L consolidations, multifocal pneumonia    TTE (EF 25%, decreased RV, mod MR)   10/3 VT -> Lidocaine gtt   10/4 Trach size 6 DCT cuff  10/5 CT abd (neg for intra-abd abcess, severe stenosis of prox SMA and b/l iliac arteries)    Today:  Plan for CPAP trials  Plan for palliative care discussion follow up on 10/11    REVIEW OF SYSTEMS:  Unable to obtain due to patients trach     ICU Vital Signs Last 24 Hrs  T(C): 37 (07 Oct 2021 08:00), Max: 37.3 (07 Oct 2021 04:00)  T(F): 98.6 (07 Oct 2021 08:00), Max: 99.1 (07 Oct 2021 04:00)  HR: 112 (07 Oct 2021 10:00) (74 - 153)  BP: --  BP(mean): --  ABP: 93/66 (07 Oct 2021 10:00) (71/60 - 162/76)  ABP(mean): 76 (07 Oct 2021 10:00) (63 - 132)  RR: 22 (07 Oct 2021 10:00) (16 - 38)  SpO2: 100% (07 Oct 2021 10:00) (97% - 100%)      Physical Exam:  Gen:  NAD  CNS: moves all extremities to command on low dose sedation   Neck: no JVD, +trach   RES : coarse breath sounds, no wheezing    CVS: +LVAD hum   Abd: Soft. Dawn drain with bilious fluid. Positive BS throughout. Mild RUQ abdominal tenderness by perc dawn.  : Landrum intact draining urine without hematuria.  Skin: No rash, erythema, cyanosis.  Vasc: Warm and well-perfused.  Ext:  no edema    ============================I/O===========================  I&O's Detail    07 Oct 2021 07:01  -  08 Oct 2021 07:00  --------------------------------------------------------  IN:    Albumin 5%  - 250 mL: 750 mL    Albumin 5% - 50 mL: 250 mL    Dexmedetomidine: 92.7 mL    Enteral Tube Flush: 80 mL    IV PiggyBack: 50 mL    IV PiggyBack: 200 mL    Miscellaneous Tube Feedin mL    Propofol: 15 mL    sodium chloride 0.9%: 190 mL    Vasopressin: 44 mL  Total IN: 2011.7 mL    OUT:    Drain (mL): 75 mL    Indwelling Catheter - Urethral (mL): 345 mL  Total OUT: 420 mL    Total NET: 1591.7 mL      08 Oct 2021 07:01  -  08 Oct 2021 16:43  --------------------------------------------------------  IN:    Dexmedetomidine: 12.4 mL    IV PiggyBack: 300 mL    Miscellaneous Tube Feedin mL    sodium chloride 0.9%: 40 mL  Total IN: 472.4 mL    OUT:    Drain (mL): 100 mL    Indwelling Catheter - Urethral (mL): 1000 mL    Propofol: 0 mL    Vasopressin: 0 mL  Total OUT: 1100 mL    Total NET: -627.6 mL         ============================ LABS =========================                                            9.9    7.76  )-----------( 180      ( 08 Oct 2021 00:43 )             31.0     10-08    134<L>  |  99  |  20  ----------------------------<  214<H>  4.4   |  19<L>  |  0.37<L>    Ca    10.4      08 Oct 2021 00:43  Phos  2.2     10-08  Mg     1.5     10-08    TPro  8.3  /  Alb  4.4  /  TBili  1.2  /  DBili  x   /  AST  17  /  ALT  15  /  AlkPhos  167<H>  10-08      ABG - ( 08 Oct 2021 00:33 )  pH, Arterial: 7.42  pH, Blood: x     /  pCO2: 34    /  pO2: 193   / HCO3: 22    / Base Excess: -1.9  /  SaO2: 99.6              ======================Micro/Rad/Cardio=================  Culture: Reviewed   CXR: Reviewed  Echo:Reviewed  ======================================================  PAST MEDICAL & SURGICAL HISTORY:  CHF (congestive heart failure)    CAD (coronary artery disease)    Depression    Pleural effusion    History of  novel coronavirus disease (COVID-19)  2020    Hemorrhoids    Bleeding hemorrhoids    Peripheral arterial disease    Claudication    BPH with urinary obstruction    ACC/AHA stage D systolic heart failure    Anticoagulation goal of INR 2.0 to 2.5    Falls    Clavicle fracture    CKD (chronic kidney disease), stage III    Iron deficiency anemia    H/O epistaxis    Vertigo    GI bleed    S/P TVR (tricuspid valve repair)    S/P ventricular assist device    S/P endoscopy      ====================ASSESSMENT ==============  Stage D Nonischemic Cardiomyopathy, Status Post HM2 on 2017    Cardiogenic shock  Hemodynamic instability   Acute hypoxemic respiratory failure s/p trach , decannulated on ; Reintubated on    GI bleed , Status Post Enteroscopy   Anemia, in setting of melena   Chronic Kidney Disease  Stress hyperglycemia   C.diff positive on    Hypovolemic shock  Septic shock  Leukocytosis  GB thickening/percholecystic s/p perc choley by IT   SMA stenosis  Serratia/citrobacter pneumonia   Stenotrophomonas pneumonia   Enterobacter pneumonia   Nasuea/vomiting  Deconditioning    Plan:  ====================== NEUROLOGY=====================  Sedated with IV Precedex, wean as tolerated for vent synchrony  Tylenol PRN for analgesia     acetaminophen    Suspension .. 650 milliGRAM(s) Enteral Tube every 6 hours PRN Mild Pain (1 - 3)  dexMEDEtomidine Infusion 1.5 MICROgram(s)/kG/Hr (23.1 mL/Hr) IV Continuous <Continuous>    ==================== RESPIRATORY======================  Acute hypoxemic respiratory failure s/p #8 shiley trach on ; decannulated on ; Reintubated on ; trach size 6 DCT cuff on 10/4   Continue close monitoring of respiratory rate and breathing pattern, following of ABG’s with A-line monitoring, continuous pulse oximetry monitoring.   CPAP trials as tolerated    Mechanical Ventilation:  Mode: AC/ CMV (Assist Control/ Continuous Mandatory Ventilation)  RR (machine): 12  TV (machine): 500  FiO2: 40  PEEP: 5  ITime: 1  MAP: 10  PIP: 22      ====================CARDIOVASCULAR==================  Stage D Nonischemic Cardiomyopathy, Status Post HM2 on 2017; LVAD settings 9200, flow 5.8  TTE 10/4:  Severely decreased LV systolic function, Diffuse hypokinesis. Mild AR. No pericardial effusion   VT on 10/4, s/p Lidocaine drip, continue close tele monitoring   Pressor support with IV Vasopressin, wean as tolerated  Holding off on Propranolol for now given pressor requirement    vasopressin Infusion 0.017 Unit(s)/Min (1 mL/Hr) IV Continuous <Continuous>    ===================HEMATOLOGIC/ONC ===================  CTA A/P on  +L RP hematoma   Acute blood loss anemia, H/H 10.9/33.7, S/P 2U of pRBCs yesterday and monitor CBC   Thrombocytopenia, possible in setting of sepsis, now starting to trend up, 130->179, Rest of labs not consistent with DIC  Holding coumadin and ASA given recurrent GI bleeding, continue monitoring LDH   Monitor H&H/Plts     ===================== RENAL =========================  Hx of CKD, continue monitoring urine output, I&OS, BUN/Cr   Euvolemic, even fluid balance, currently off diuretics.    Replete lytes PRN. Keep K> 4 and Mg >2  C/w Aldactone for diuresis    spironolactone 12.5 milliGRAM(s) Oral every 12 hours    ==================== GASTROINTESTINAL===================  Held TFs this AM for line changes   CT A/P 10/5 neg for intra-abd abcess, severe stenosis of prox SMA and b/l iliac arteries  CTA A/P : Evaluation of the mesenteric vessels is limited by streak artifact from LVAD. There appears to be severe stenosis of the proximal SMA; abdominal mesenteric doppler is recommended for further evaluation. 2.  No small bowel findings to suggest acute mesenteric ischemia. 3.  Focal dissections involving the right and left common iliac arteries.  Mesenteric duplex on 8/15 borderline stenosis of proximal SMA, s/p failed SMA stent, L fem RP hematoma on 9/10; No intervention for cholecystostomy tube/PEJ at this time   Reglan for gut motility  PRN Simethicone for gas    albumin human  5% IVPB 250 milliLiter(s) IV Intermittent once  multivitamin 1 Tablet(s) Oral daily  GI prophylaxis, pantoprazole  Injectable 40 milliGRAM(s) IV Push every 12 hours  simethicone 80 milliGRAM(s) Chew every 8 hours PRN Gas  sodium chloride 0.9%. 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       Per endocrine      - c/w Methimazole, adjust based on labs        - Check Free T4 and total T3     insulin lispro (ADMELOG) corrective regimen sliding scale   SubCutaneous every 6 hours  methimazole 20 milliGRAM(s) Oral <User Schedule>    ========================INFECTIOUS DISEASE================  Afebrile, WBC rising 11.59->15.48   Continue trending WBC and monitoring fever curve   CT C/A/P : progressive ISABELLE opacities and L consolidations, multifocal pneumonia  BCx  + Serratia marcescens, BCx 1/2 on 10/1 +Enterobacter cloacae complex, BCx 10/3 +GNR, repeat BCx x2 10/5 NGTD   Empiric coverage with Meropenem   Vancomycin solution for C.diff prophylaxis  Will f/u antibiotic regimen with ID    meropenem  IVPB 1000 milliGRAM(s) IV Intermittent every 8 hours  vancomycin    Solution 125 milliGRAM(s) Oral every 12 hours      Patient requires continuous monitoring with bedside rhythm monitoring, pulse ox monitoring, and intermittent blood gas analysis. Care plan discussed with ICU care team. Patient remained critical and at risk for life threatening decompensation.

## 2021-10-08 NOTE — PROGRESS NOTE ADULT - SUBJECTIVE AND OBJECTIVE BOX
ENT ISSUE: Tracheostomy     SUBJECTIVE: Pt seen and examined at bedside. There were no acute overnight events. The patient is laying in bed comfortably. He is breathing comfortably with trach attached to the vent.       PAST MEDICAL & SURGICAL HISTORY:  CHF (congestive heart failure)    CAD (coronary artery disease)    Depression    Pleural effusion    History of 2019 novel coronavirus disease (COVID-19)  april 2020    Hemorrhoids    Bleeding hemorrhoids    Peripheral arterial disease    Claudication    BPH with urinary obstruction    ACC/AHA stage D systolic heart failure    Anticoagulation goal of INR 2.0 to 2.5    Falls    Clavicle fracture    CKD (chronic kidney disease), stage III    Iron deficiency anemia    H/O epistaxis    Vertigo    GI bleed    S/P TVR (tricuspid valve repair)    S/P ventricular assist device    S/P endoscopy      Allergies    Amiodarone Hydrochloride (Other (Severe))    Intolerances      MEDICATIONS  (STANDING):  chlorhexidine 0.12% Liquid 15 milliLiter(s) Oral Mucosa two times a day  chlorhexidine 2% Cloths 1 Application(s) Topical <User Schedule>  dexMEDEtomidine Infusion 1.5 MICROgram(s)/kG/Hr (23.1 mL/Hr) IV Continuous <Continuous>  insulin lispro (ADMELOG) corrective regimen sliding scale   SubCutaneous every 6 hours  meropenem  IVPB 1000 milliGRAM(s) IV Intermittent every 8 hours  methimazole 20 milliGRAM(s) Oral every 8 hours  metoclopramide Injectable 10 milliGRAM(s) IV Push every 8 hours  multivitamin 1 Tablet(s) Oral daily  pantoprazole  Injectable 40 milliGRAM(s) IV Push every 12 hours  predniSONE   Tablet 40 milliGRAM(s) Oral daily  propofol Infusion 13.528 MICROgram(s)/kG/Min (5 mL/Hr) IV Continuous <Continuous>  sodium chloride 0.9%. 1000 milliLiter(s) (10 mL/Hr) IV Continuous <Continuous>  spironolactone 12.5 milliGRAM(s) Oral every 12 hours  thiamine 100 milliGRAM(s) Oral daily  vancomycin    Solution 125 milliGRAM(s) Oral every 12 hours  vasopressin Infusion 0.017 Unit(s)/Min (1 mL/Hr) IV Continuous <Continuous>    MEDICATIONS  (PRN):  acetaminophen    Suspension .. 650 milliGRAM(s) Enteral Tube every 6 hours PRN Mild Pain (1 - 3)  fentaNYL    Injectable 12.5 MICROGram(s) IV Push every 3 hours PRN Moderate to severe pain  simethicone 80 milliGRAM(s) Chew every 8 hours PRN Gas      Social History: see consult    Family history: see consult    ROS:   ENT: all negative except as noted in HPI   Pulm: denies SOB, cough, hemoptysis  Neuro: denies numbness/tingling, loss of sensation  Endo: denies heat/cold intolerance, excessive sweating      Vital Signs Last 24 Hrs  T(C): 36.7 (08 Oct 2021 04:00), Max: 36.8 (07 Oct 2021 16:00)  T(F): 98.1 (08 Oct 2021 04:00), Max: 98.2 (07 Oct 2021 16:00)  HR: 83 (08 Oct 2021 08:13) (70 - 120)  BP: --  BP(mean): --  RR: 21 (07 Oct 2021 17:00) (15 - 42)  SpO2: 100% (08 Oct 2021 08:13) (98% - 100%)                          9.9    7.76  )-----------( 180      ( 08 Oct 2021 00:43 )             31.0    10-08    134<L>  |  99  |  20  ----------------------------<  214<H>  4.4   |  19<L>  |  0.37<L>    Ca    10.4      08 Oct 2021 00:43  Phos  2.2     10-08  Mg     1.5     10-08    TPro  8.3  /  Alb  4.4  /  TBili  1.2  /  DBili  x   /  AST  17  /  ALT  15  /  AlkPhos  167<H>  10-08       PHYSICAL EXAM:  Gen: NAD, well-developed  Head: Normocephalic, Atraumatic  Face: no edema/erythema/fluctuance  Eyes:  no scleral injection  Nose: Nares bilaterally patent, no discharge  Mouth: No Stridor / Drooling / Trismus.  Mucosa moist, tongue/uvula midline, oropharynx clear  Neck: #6 DCT inflated cuff with minimal serosanguinous oozing from stoma, no active bleeding, sutures x4 and umbilical tie in place.  No lymphadenopathy, trachea midline, no masses  Resp: ventilating well  CV: no peripheral edema/cyanosis

## 2021-10-08 NOTE — PROGRESS NOTE ADULT - ASSESSMENT
50 y/o POD #3 for tracheostomy 2/2 respiratory failure. Pt is doing well, #6 DCT cuffed in place, on the vent, with minimal serosanguinous oozing from stoma, no active bleed. sutures x4 and umbilical tie in place.     Plan:  - Continue vent per RT/CTU  - Suction prn   - Continue with trach site care, keep dry and clean   - Sutures will be removed 10/11  - Call ENT with any issues [id number] 52 y/o POD #4 for tracheostomy 2/2 respiratory failure. Pt is doing well, #6 DCT cuffed in place, on the vent, with minimal serosanguinous oozing from stoma, no active bleed. sutures x4 and umbilical tie in place.     Plan:  - Continue vent per RT/CTU  - Suction prn   - Continue with trach site care, keep dry and clean   - Sutures will be removed 10/11  - Call ENT with any issues [phone]

## 2021-10-08 NOTE — PROGRESS NOTE ADULT - ASSESSMENT
52 y/o POD #4 for tracheostomy 2/2 respiratory failure. Pt is doing well, #6 DCT cuffed in place, on the vent, with minimal serosanguinous oozing from stoma, no active bleed. sutures x4 and umbilical tie in place.

## 2021-10-09 NOTE — PROGRESS NOTE ADULT - SUBJECTIVE AND OBJECTIVE BOX
RICKY HAMPTON  MRN-[id number]  Patient is a 66y old  Male who presents with a chief complaint of Anemia, Supratherapeutic INR, Dark Stools (07 Oct 2021 07:48)    HPI:  65M PMH ACC/AHA stage D HF due to NICM HM2 LVAD , TV annuloplasty ring 17 as destination therapy due to severe peripheral artery disease with significant stenosis  SIADH, Depression, CKD-3 with hyperkalemia, past E. coli UTIs, driveline drainage (21) and COVID-19 (back in 2020)  He was recently seen in clinic where he complained of abdominal pain and dark stools w constipation back in May. He presents to [facility] ER today weakness and fatigue, moderate and + Black stools for three days, on coumadin secondary to warfarin use in the setting of an LVAD. Patient has required transfusions for GIB in the past. Mostly recently back in 2021 pt had anemia with dark stools. No interventions was done at that time. However Last Endoscopy was done in 2020 (negative). Today labs show patient is anemic with H/H of 4.5/16.3,. INR is 8.84 MAP in the 90s, Temp 35.1. He denies any chest pain, shortness of breath, dizziness, abd pain, nausea or vomiting.       (2021 16:57)      Surgery/Hospital Course:   admit for melena w/ anemia, INR 8.84   6/15 Capsul study (+) for small bowel bleed, balloon endoscopy (old blood in prox ileum); post EGD - septic w/ L opacity, re-intubated for concern for aspiration, TTE (Mod MR, decrease biV w/ interventricular septum boweing towards R)   bronch    +C Diff    CT C/A/P: Fluid filled colon which may be 2/2 rapid transit. Small bilateral pleffs with associates. Compressive atelectasis New ISABELLE & LLL  parenchymal opacities, suspicious for pneumonia. Moderate stenosis in the proximal superior mesenteric artery.    #8 Shiley trach at bedside    LVAD speed increased to 9200   Bronch   TC since . Patient transferred to SDU.    INR today 2.64.  H&H 7.3/24 this AM.  Will repeat CBC at noon, and will send stool guaiac Patient with persistent abdominal tenderness, rate of tube feeds decreased.  No nausea/vomiting.     INR today 2.4. H&H 9.1/28.6 low flow overnight /N&V, refusing Tube feeds on D5 1/2 normal  @50 cc/hr   INR 2.69  H&H 7.7/.1 refusing Tube feeds on D5 1/2 normal  @50 cc/hr. This am + BM Melena Dr Oneill HF  aware- PRBC x1  GI team consulted -  NPO  plan on study in am-  D/w Dr Cadet Patient  to return to CTU for further management; 1PRBCS    Post op INR 2.2 today.  No bleeding. BC + for SM.  Pt is hypotensive requiring pressor and inotropic support.  ID follow up today on Cefepime will follow.   R PTC for PTX    CT C/A/P: sub q emphysema in R chest wall, GGO RUL, small ascites CTH negative; Abd US: GB thickening, pericholecystic fluid     Perchole drain in place continues to drain total output overnight 133.  Fever today 38.8    duplex LE negative    Patient with persistent abdominal pain, refusing tube feeds and medications, Psych consulted   CTA A/P ordered to r/o mesenteric ischemia 2/2 persistent anorexia, nausea, vomiting. Revealed:  Evaluation of the mesenteric vessels is limited by streak artifact from LVAD. There appears to be severe stenosis of the proximal SMA; abdominal mesenteric doppler is recommended for further evaluation. 2.  No small bowel findings to suggest acute mesenteric ischemia. 3.  Focal dissections involving the right and left common iliac arteries.  8/15: Cultures resulted BC 1/2 +GPC in clusters, SC enterobacter; mesenteric duplex: borderline stenosis of proximal SMA  : CT C/A/P noncon: Nondular opacities in R lung apex w/cavitation, abd nl  :  Continue current care, treatment of thyrotoxicosis with medications as per endocrine, d/c ABX as per team.    RUQ sono: Contracted gallbladder with cholecystostomy tube in place.  9/10 failed SMA stent, L fem PSA, s/p 3U PRCB   CTA Abd/Pelvis L RP hematoma    Trach decannulated    intubated fro resp distress for increased WOB, LL pneumonia; CT C/A/P: progressive ISABELLE opacities and L consolidations, multifocal pneumonia    TTE (EF 25%, decreased RV, mod MR)   10/3 VT -> Lidocaine gtt   10/4 Trach size 6 DCT cuff  10/5 CT abd (neg for intra-abd abcess, severe stenosis of prox SMA and b/l iliac arteries)    Today:  Plan for CPAP trials  Plan for palliative care discussion follow up on 10/11    REVIEW OF SYSTEMS:  Unable to obtain due to patients trach     ICU Vital Signs Last 24 Hrs  T(C): 36.9 (09 Oct 2021 08:00), Max: 37.1 (09 Oct 2021 00:00)  T(F): 98.4 (09 Oct 2021 08:00), Max: 98.8 (09 Oct 2021 00:00)  HR: 110 (09 Oct 2021 10:00) (85 - 163)  BP: --  BP(mean): --  ABP: 114/84 (09 Oct 2021 10:00) (89/72 - 130/90)  ABP(mean): 98 (09 Oct 2021 10:00) (79 - 107)  RR: 28 (09 Oct 2021 10:00) (16 - 38)  SpO2: 100% (09 Oct 2021 10:00) (99% - 100%)      Physical Exam:  Gen:  NAD  CNS: moves all extremities to command on low dose sedation   Neck: no JVD, +trach   RES : coarse breath sounds, no wheezing    CVS: +LVAD hum   Abd: Soft. Dawn drain with bilious fluid. Positive BS throughout. Mild RUQ abdominal tenderness by perc dawn.  : Landrum intact draining urine without hematuria.  Skin: No rash, erythema, cyanosis.  Vasc: Warm and well-perfused.  Ext:  no edema    ============================I/O===========================  I&O's Detail    07 Oct 2021 07:01  -  08 Oct 2021 07:00  --------------------------------------------------------  IN:    Albumin 5%  - 250 mL: 750 mL    Albumin 5% - 50 mL: 250 mL    Dexmedetomidine: 92.7 mL    Enteral Tube Flush: 80 mL    IV PiggyBack: 50 mL    IV PiggyBack: 200 mL    Miscellaneous Tube Feedin mL    Propofol: 15 mL    sodium chloride 0.9%: 190 mL    Vasopressin: 44 mL  Total IN: 2011.7 mL    OUT:    Drain (mL): 75 mL    Indwelling Catheter - Urethral (mL): 345 mL  Total OUT: 420 mL    Total NET: 1591.7 mL      08 Oct 2021 07:01  -  08 Oct 2021 16:43  --------------------------------------------------------  IN:    Dexmedetomidine: 12.4 mL    IV PiggyBack: 300 mL    Miscellaneous Tube Feedin mL    sodium chloride 0.9%: 40 mL  Total IN: 472.4 mL    OUT:    Drain (mL): 100 mL    Indwelling Catheter - Urethral (mL): 1000 mL    Propofol: 0 mL    Vasopressin: 0 mL  Total OUT: 1100 mL    Total NET: -627.6 mL         ============================ LABS =========================                                            9.9    7.76  )-----------( 180      ( 08 Oct 2021 00:43 )             31.0     10-08    134<L>  |  99  |  20  ----------------------------<  214<H>  4.4   |  19<L>  |  0.37<L>    Ca    10.4      08 Oct 2021 00:43  Phos  2.2     10-08  Mg     1.5     10-08    TPro  8.3  /  Alb  4.4  /  TBili  1.2  /  DBili  x   /  AST  17  /  ALT  15  /  AlkPhos  167<H>  10-08      ABG - ( 08 Oct 2021 00:33 )  pH, Arterial: 7.42  pH, Blood: x     /  pCO2: 34    /  pO2: 193   / HCO3: 22    / Base Excess: -1.9  /  SaO2: 99.6              ======================Micro/Rad/Cardio=================  Culture: Reviewed   CXR: Reviewed  Echo:Reviewed  ======================================================  PAST MEDICAL & SURGICAL HISTORY:  CHF (congestive heart failure)    CAD (coronary artery disease)    Depression    Pleural effusion    History of 2019 novel coronavirus disease (COVID-19)  2020    Hemorrhoids    Bleeding hemorrhoids    Peripheral arterial disease    Claudication    BPH with urinary obstruction    ACC/AHA stage D systolic heart failure    Anticoagulation goal of INR 2.0 to 2.5    Falls    Clavicle fracture    CKD (chronic kidney disease), stage III    Iron deficiency anemia    H/O epistaxis    Vertigo    GI bleed    S/P TVR (tricuspid valve repair)    S/P ventricular assist device    S/P endoscopy      ====================ASSESSMENT ==============  Stage D Nonischemic Cardiomyopathy, Status Post HM2 on 2017    Cardiogenic shock  Hemodynamic instability   Acute hypoxemic respiratory failure s/p trach , decannulated on ; Reintubated on    GI bleed , Status Post Enteroscopy   Anemia, in setting of melena   Chronic Kidney Disease  Stress hyperglycemia   C.diff positive on    Hypovolemic shock  Septic shock  Leukocytosis  GB thickening/percholecystic s/p perc choley by IT   SMA stenosis  Serratia/citrobacter pneumonia   Stenotrophomonas pneumonia   Enterobacter pneumonia   Nasuea/vomiting  Deconditioning    Plan:  ====================== NEUROLOGY=====================  Sedated with IV Precedex, wean as tolerated for vent synchrony  Tylenol PRN for analgesia     acetaminophen    Suspension .. 650 milliGRAM(s) Enteral Tube every 6 hours PRN Mild Pain (1 - 3)  dexMEDEtomidine Infusion 1.5 MICROgram(s)/kG/Hr (23.1 mL/Hr) IV Continuous <Continuous>    ==================== RESPIRATORY======================  Acute hypoxemic respiratory failure s/p #8 shiley trach on ; decannulated on ; Reintubated on ; trach size 6 DCT cuff on 10/4   Continue close monitoring of respiratory rate and breathing pattern, following of ABG’s with A-line monitoring, continuous pulse oximetry monitoring.   CPAP trials as tolerated    Mechanical Ventilation:  Mode: AC/ CMV (Assist Control/ Continuous Mandatory Ventilation)  RR (machine): 12  TV (machine): 500  FiO2: 40  PEEP: 5  ITime: 1  MAP: 10  PIP: 22      ====================CARDIOVASCULAR==================  Stage D Nonischemic Cardiomyopathy, Status Post HM2 on 2017; LVAD settings 9200, flow 5.8  TTE 10/4:  Severely decreased LV systolic function, Diffuse hypokinesis. Mild AR. No pericardial effusion   VT on 10/4, s/p Lidocaine drip, continue close tele monitoring   Pressor support with IV Vasopressin, wean as tolerated  Holding off on Propranolol for now given pressor requirement    vasopressin Infusion 0.017 Unit(s)/Min (1 mL/Hr) IV Continuous <Continuous>    ===================HEMATOLOGIC/ONC ===================  CTA A/P on  +L RP hematoma   Acute blood loss anemia, H/H 10.9/33.7, S/P 2U of pRBCs yesterday and monitor CBC   Thrombocytopenia, possible in setting of sepsis, now starting to trend up, 130->179, Rest of labs not consistent with DIC  Holding coumadin and ASA given recurrent GI bleeding, continue monitoring LDH   Monitor H&H/Plts     ===================== RENAL =========================  Hx of CKD, continue monitoring urine output, I&OS, BUN/Cr   Euvolemic, even fluid balance, currently off diuretics.    Replete lytes PRN. Keep K> 4 and Mg >2  C/w Aldactone for diuresis    spironolactone 12.5 milliGRAM(s) Oral every 12 hours    ==================== GASTROINTESTINAL===================  Held TFs this AM for line changes   CT A/P 10/5 neg for intra-abd abcess, severe stenosis of prox SMA and b/l iliac arteries  CTA A/P : Evaluation of the mesenteric vessels is limited by streak artifact from LVAD. There appears to be severe stenosis of the proximal SMA; abdominal mesenteric doppler is recommended for further evaluation. 2.  No small bowel findings to suggest acute mesenteric ischemia. 3.  Focal dissections involving the right and left common iliac arteries.  Mesenteric duplex on 8/15 borderline stenosis of proximal SMA, s/p failed SMA stent, L fem RP hematoma on 9/10; No intervention for cholecystostomy tube/PEJ at this time   Reglan for gut motility  PRN Simethicone for gas    albumin human  5% IVPB 250 milliLiter(s) IV Intermittent once  multivitamin 1 Tablet(s) Oral daily  GI prophylaxis, pantoprazole  Injectable 40 milliGRAM(s) IV Push every 12 hours  simethicone 80 milliGRAM(s) Chew every 8 hours PRN Gas  sodium chloride 0.9%. 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       Per endocrine      - c/w Methimazole, adjust based on labs        - Check Free T4 and total T3     insulin lispro (ADMELOG) corrective regimen sliding scale   SubCutaneous every 6 hours  methimazole 20 milliGRAM(s) Oral <User Schedule>    ========================INFECTIOUS DISEASE================  Afebrile, WBC rising 11.59->15.48   Continue trending WBC and monitoring fever curve   CT C/A/P : progressive ISABELLE opacities and L consolidations, multifocal pneumonia  BCx  + Serratia marcescens, BCx 1/2 on 10/1 +Enterobacter cloacae complex, BCx 10/3 +GNR, repeat BCx x2 10/5 NGTD   Empiric coverage with Meropenem   Vancomycin solution for C.diff prophylaxis  Will f/u antibiotic regimen with ID    meropenem  IVPB 1000 milliGRAM(s) IV Intermittent every 8 hours  vancomycin    Solution 125 milliGRAM(s) Oral every 12 hours      Patient requires continuous monitoring with bedside rhythm monitoring, pulse ox monitoring, and intermittent blood gas analysis. Care plan discussed with ICU care team. Patient remained critical and at risk for life threatening decompensation.      RICKY HAMPTON  MRN-[id number]  Patient is a 66y old  Male who presents with a chief complaint of Anemia, Supratherapeutic INR, Dark Stools (07 Oct 2021 07:48)    HPI:  65M PMH ACC/AHA stage D HF due to NICM HM2 LVAD , TV annuloplasty ring 17 as destination therapy due to severe peripheral artery disease with significant stenosis  SIADH, Depression, CKD-3 with hyperkalemia, past E. coli UTIs, driveline drainage (21) and COVID-19 (back in 2020)  He was recently seen in clinic where he complained of abdominal pain and dark stools w constipation back in May. He presents to [facility] ER today weakness and fatigue, moderate and + Black stools for three days, on coumadin secondary to warfarin use in the setting of an LVAD. Patient has required transfusions for GIB in the past. Mostly recently back in 2021 pt had anemia with dark stools. No interventions was done at that time. However Last Endoscopy was done in 2020 (negative). Today labs show patient is anemic with H/H of 4.5/16.3,. INR is 8.84 MAP in the 90s, Temp 35.1. He denies any chest pain, shortness of breath, dizziness, abd pain, nausea or vomiting.       (2021 16:57)      Surgery/Hospital Course:   admit for melena w/ anemia, INR 8.84   6/15 Capsul study (+) for small bowel bleed, balloon endoscopy (old blood in prox ileum); post EGD - septic w/ L opacity, re-intubated for concern for aspiration, TTE (Mod MR, decrease biV w/ interventricular septum boweing towards R)   bronch    +C Diff    CT C/A/P: Fluid filled colon which may be 2/2 rapid transit. Small bilateral pleffs with associates. Compressive atelectasis New ISABELLE & LLL  parenchymal opacities, suspicious for pneumonia. Moderate stenosis in the proximal superior mesenteric artery.    #8 Shiley trach at bedside    LVAD speed increased to 9200   Bronch   TC since . Patient transferred to SDU.    INR today 2.64.  H&H 7.3/24 this AM.  Will repeat CBC at noon, and will send stool guaiac Patient with persistent abdominal tenderness, rate of tube feeds decreased.  No nausea/vomiting.     INR today 2.4. H&H 9.1/28.6 low flow overnight /N&V, refusing Tube feeds on D5 1/2 normal  @50 cc/hr   INR 2.69  H&H 7.7/.1 refusing Tube feeds on D5 1/2 normal  @50 cc/hr. This am + BM Melena Dr Oneill HF  aware- PRBC x1  GI team consulted -  NPO  plan on study in am-  D/w Dr Cadet Patient  to return to CTU for further management; 1PRBCS    Post op INR 2.2 today.  No bleeding. BC + for SM.  Pt is hypotensive requiring pressor and inotropic support.  ID follow up today on Cefepime will follow.   R PTC for PTX    CT C/A/P: sub q emphysema in R chest wall, GGO RUL, small ascites CTH negative; Abd US: GB thickening, pericholecystic fluid     Perchole drain in place continues to drain total output overnight 133.  Fever today 38.8    duplex LE negative    Patient with persistent abdominal pain, refusing tube feeds and medications, Psych consulted   CTA A/P ordered to r/o mesenteric ischemia 2/2 persistent anorexia, nausea, vomiting. Revealed:  Evaluation of the mesenteric vessels is limited by streak artifact from LVAD. There appears to be severe stenosis of the proximal SMA; abdominal mesenteric doppler is recommended for further evaluation. 2.  No small bowel findings to suggest acute mesenteric ischemia. 3.  Focal dissections involving the right and left common iliac arteries.  8/15: Cultures resulted BC 1/2 +GPC in clusters, SC enterobacter; mesenteric duplex: borderline stenosis of proximal SMA  : CT C/A/P noncon: Nondular opacities in R lung apex w/cavitation, abd nl  :  Continue current care, treatment of thyrotoxicosis with medications as per endocrine, d/c ABX as per team.    RUQ sono: Contracted gallbladder with cholecystostomy tube in place.  9/10 failed SMA stent, L fem PSA, s/p 3U PRCB   CTA Abd/Pelvis L RP hematoma    Trach decannulated    intubated fro resp distress for increased WOB, LL pneumonia; CT C/A/P: progressive ISABELLE opacities and L consolidations, multifocal pneumonia    TTE (EF 25%, decreased RV, mod MR)   10/3 VT -> Lidocaine gtt   10/4 Trach size 6 DCT cuff  10/5 CT abd (neg for intra-abd abcess, severe stenosis of prox SMA and b/l iliac arteries)    Today:  Plan for CPAP trials  Tube feeds on hold for 4 episodes of vomitting  Plan for palliative care discussion follow up on 10/11    REVIEW OF SYSTEMS:  Unable to obtain due to patients trach     ICU Vital Signs Last 24 Hrs  T(C): 36.9 (09 Oct 2021 08:00), Max: 37.1 (09 Oct 2021 00:00)  T(F): 98.4 (09 Oct 2021 08:00), Max: 98.8 (09 Oct 2021 00:00)  HR: 110 (09 Oct 2021 10:00) (85 - 163)  BP: --  BP(mean): --  ABP: 114/84 (09 Oct 2021 10:00) (89/72 - 130/90)  ABP(mean): 98 (09 Oct 2021 10:00) (79 - 107)  RR: 28 (09 Oct 2021 10:00) (16 - 38)  SpO2: 100% (09 Oct 2021 10:00) (99% - 100%)      Physical Exam:  Gen:  NAD  CNS: moves all extremities to command on low dose sedation   Neck: no JVD, +trach   RES : coarse breath sounds, no wheezing    CVS: +LVAD hum   Abd: Soft. Dawn drain with bilious fluid. Positive BS throughout. Mild RUQ abdominal tenderness by perc dawn.  : Landrum intact draining urine without hematuria.  Skin: No rash, erythema, cyanosis.  Vasc: Warm and well-perfused.  Ext:  no edema    ============================I/O===========================  I&O's Detail    08 Oct 2021 07:01  -  09 Oct 2021 07:00  --------------------------------------------------------  IN:    Dexmedetomidine: 12.4 mL    dextrose 5% + sodium chloride 0.45%: 300 mL    Enteral Tube Flush: 75 mL    IV PiggyBack: 550 mL    Miscellaneous Tube Feedin mL    sodium chloride 0.9%: 190 mL  Total IN: 1327.4 mL    OUT:    Drain (mL): 400 mL    Indwelling Catheter - Urethral (mL): 1775 mL    Propofol: 0 mL    Vasopressin: 0 mL  Total OUT: 2175 mL    Total NET: -847.6 mL      09 Oct 2021 07:01  -  09 Oct 2021 11:08  --------------------------------------------------------  IN:    dextrose 5% + sodium chloride 0.45%: 120 mL    IV PiggyBack: 50 mL    sodium chloride 0.9%: 40 mL  Total IN: 210 mL    OUT:    Indwelling Catheter - Urethral (mL): 110 mL    Miscellaneous Tube Feedin mL  Total OUT: 110 mL    Total NET: 100 mL           ============================ LABS =========================                               11.3   16.10 )-----------( 230      ( 09 Oct 2021 00:18 )             34.4     10-09    140  |  103  |  15  ----------------------------<  93  3.2<L>   |  22  |  0.39<L>    Ca    10.6<H>      09 Oct 2021 00:18  Phos  2.6     10-09  Mg     1.7     10-09    TPro  8.6<H>  /  Alb  4.4  /  TBili  1.1  /  DBili  x   /  AST  25  /  ALT  17  /  AlkPhos  166<H>  10-09      ABG - ( 09 Oct 2021 00:08 )  pH, Arterial: 7.44  pH, Blood: x     /  pCO2: 40    /  pO2: 190   / HCO3: 27    / Base Excess: 2.8   /  SaO2: 99.5               ======================Micro/Rad/Cardio=================  Culture: Reviewed   CXR: Reviewed  Echo:Reviewed  ======================================================  PAST MEDICAL & SURGICAL HISTORY:  CHF (congestive heart failure)    CAD (coronary artery disease)    Depression    Pleural effusion    History of 2019 novel coronavirus disease (COVID-19)  2020    Hemorrhoids    Bleeding hemorrhoids    Peripheral arterial disease    Claudication    BPH with urinary obstruction    ACC/AHA stage D systolic heart failure    Anticoagulation goal of INR 2.0 to 2.5    Falls    Clavicle fracture    CKD (chronic kidney disease), stage III    Iron deficiency anemia    H/O epistaxis    Vertigo    GI bleed    S/P TVR (tricuspid valve repair)    S/P ventricular assist device    S/P endoscopy      ====================ASSESSMENT ==============  Stage D Nonischemic Cardiomyopathy, Status Post HM2 on 2017    Cardiogenic shock  Hemodynamic instability   Acute hypoxemic respiratory failure s/p trach , decannulated on ; Reintubated on    GI bleed , Status Post Enteroscopy   Anemia, in setting of melena   Chronic Kidney Disease  Stress hyperglycemia   C.diff positive on    Hypovolemic shock  Septic shock  Leukocytosis  GB thickening/percholecystic s/p perc choley by IT   SMA stenosis  Serratia/citrobacter pneumonia   Stenotrophomonas pneumonia   Enterobacter pneumonia   Nasuea/vomiting  Deconditioning    Plan:  ====================== NEUROLOGY=====================  Sedated with IV Precedex, wean as tolerated for vent synchrony  Tylenol PRN for analgesia     acetaminophen    Suspension .. 650 milliGRAM(s) Enteral Tube every 6 hours PRN Mild Pain (1 - 3)  dexMEDEtomidine Infusion 1.5 MICROgram(s)/kG/Hr (23.1 mL/Hr) IV Continuous <Continuous>    ==================== RESPIRATORY======================  Acute hypoxemic respiratory failure s/p #8 shiley trach on ; decannulated on ; Reintubated on ; trach size 6 DCT cuff on 10/4   Continue close monitoring of respiratory rate and breathing pattern, following of ABG’s with A-line monitoring, continuous pulse oximetry monitoring.   CPAP trials as tolerated    Mechanical Ventilation:  Mode: AC/ CMV (Assist Control/ Continuous Mandatory Ventilation)  RR (machine): 12  TV (machine): 500  FiO2: 40  PEEP: 5  ITime: 1  MAP: 10  PIP: 22      ====================CARDIOVASCULAR==================  Stage D Nonischemic Cardiomyopathy, Status Post HM2 on 2017; LVAD settings 9200, flow 5.8  TTE 10/4:  Severely decreased LV systolic function, Diffuse hypokinesis. Mild AR. No pericardial effusion   VT on 10/4, s/p Lidocaine drip, continue close tele monitoring   Pressor support with IV Vasopressin, wean as tolerated  Holding off on Propranolol for now given pressor requirement    vasopressin Infusion 0.017 Unit(s)/Min (1 mL/Hr) IV Continuous <Continuous>    ===================HEMATOLOGIC/ONC ===================  CTA A/P on  +L RP hematoma   Acute blood loss anemia, H/H 10.9/33.7, S/P 2U of pRBCs yesterday and monitor CBC   Thrombocytopenia, possible in setting of sepsis, now starting to trend up, 130->179, Rest of labs not consistent with DIC  Holding coumadin and ASA given recurrent GI bleeding, continue monitoring LDH   Monitor H&H/Plts     ===================== RENAL =========================  Hx of CKD, continue monitoring urine output, I&OS, BUN/Cr   Euvolemic, even fluid balance, currently off diuretics.    Replete lytes PRN. Keep K> 4 and Mg >2  C/w Aldactone for diuresis    spironolactone 12.5 milliGRAM(s) Oral every 12 hours    ==================== GASTROINTESTINAL===================  Held TFs this AM for line changes   CT A/P 10/5 neg for intra-abd abcess, severe stenosis of prox SMA and b/l iliac arteries  CTA A/P : Evaluation of the mesenteric vessels is limited by streak artifact from LVAD. There appears to be severe stenosis of the proximal SMA; abdominal mesenteric doppler is recommended for further evaluation. 2.  No small bowel findings to suggest acute mesenteric ischemia. 3.  Focal dissections involving the right and left common iliac arteries.  Mesenteric duplex on 8/15 borderline stenosis of proximal SMA, s/p failed SMA stent, L fem RP hematoma on 9/10; No intervention for cholecystostomy tube/PEJ at this time   Reglan for gut motility  PRN Simethicone for gas    albumin human  5% IVPB 250 milliLiter(s) IV Intermittent once  multivitamin 1 Tablet(s) Oral daily  GI prophylaxis, pantoprazole  Injectable 40 milliGRAM(s) IV Push every 12 hours  simethicone 80 milliGRAM(s) Chew every 8 hours PRN Gas  sodium chloride 0.9%. 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       Per endocrine      - c/w Methimazole, adjust based on labs        - Check Free T4 and total T3     insulin lispro (ADMELOG) corrective regimen sliding scale   SubCutaneous every 6 hours  methimazole 20 milliGRAM(s) Oral <User Schedule>    ========================INFECTIOUS DISEASE================  Afebrile, WBC rising 11.59->15.48   Continue trending WBC and monitoring fever curve   CT C/A/P : progressive ISABELLE opacities and L consolidations, multifocal pneumonia  BCx  + Serratia marcescens, BCx 1/2 on 10/1 +Enterobacter cloacae complex, BCx 10/3 +GNR, repeat BCx x2 10/5 NGTD   Empiric coverage with Meropenem   Vancomycin solution for C.diff prophylaxis  Will f/u antibiotic regimen with ID    meropenem  IVPB 1000 milliGRAM(s) IV Intermittent every 8 hours  vancomycin    Solution 125 milliGRAM(s) Oral every 12 hours      Patient requires continuous monitoring with bedside rhythm monitoring, pulse ox monitoring, and intermittent blood gas analysis. Care plan discussed with ICU care team. Patient remained critical and at risk for life threatening decompensation.      RICKY HAPMTON  MRN-[id number]  Patient is a 66y old  Male who presents with a chief complaint of Anemia, Supratherapeutic INR, Dark Stools (07 Oct 2021 07:48)    HPI:  65M PMH ACC/AHA stage D HF due to NICM HM2 LVAD , TV annuloplasty ring 17 as destination therapy due to severe peripheral artery disease with significant stenosis  SIADH, Depression, CKD-3 with hyperkalemia, past E. coli UTIs, driveline drainage (21) and COVID-19 (back in 2020)  He was recently seen in clinic where he complained of abdominal pain and dark stools w constipation back in May. He presents to [facility] ER today weakness and fatigue, moderate and + Black stools for three days, on coumadin secondary to warfarin use in the setting of an LVAD. Patient has required transfusions for GIB in the past. Mostly recently back in 2021 pt had anemia with dark stools. No interventions was done at that time. However Last Endoscopy was done in 2020 (negative). Today labs show patient is anemic with H/H of 4.5/16.3,. INR is 8.84 MAP in the 90s, Temp 35.1. He denies any chest pain, shortness of breath, dizziness, abd pain, nausea or vomiting.       (2021 16:57)      Surgery/Hospital Course:   admit for melena w/ anemia, INR 8.84   6/15 Capsul study (+) for small bowel bleed, balloon endoscopy (old blood in prox ileum); post EGD - septic w/ L opacity, re-intubated for concern for aspiration, TTE (Mod MR, decrease biV w/ interventricular septum boweing towards R)   bronch    +C Diff    CT C/A/P: Fluid filled colon which may be 2/2 rapid transit. Small bilateral pleffs with associates. Compressive atelectasis New ISABELLE & LLL  parenchymal opacities, suspicious for pneumonia. Moderate stenosis in the proximal superior mesenteric artery.    #8 Shiley trach at bedside    LVAD speed increased to 9200   Bronch   TC since . Patient transferred to SDU.    INR today 2.64.  H&H 7.3/24 this AM.  Will repeat CBC at noon, and will send stool guaiac Patient with persistent abdominal tenderness, rate of tube feeds decreased.  No nausea/vomiting.     INR today 2.4. H&H 9.1/28.6 low flow overnight /N&V, refusing Tube feeds on D5 1/2 normal  @50 cc/hr   INR 2.69  H&H 7.7/.1 refusing Tube feeds on D5 1/2 normal  @50 cc/hr. This am + BM Melena Dr Oneill HF  aware- PRBC x1  GI team consulted -  NPO  plan on study in am-  D/w Dr Cadet Patient  to return to CTU for further management; 1PRBCS    Post op INR 2.2 today.  No bleeding. BC + for SM.  Pt is hypotensive requiring pressor and inotropic support.  ID follow up today on Cefepime will follow.   R PTC for PTX    CT C/A/P: sub q emphysema in R chest wall, GGO RUL, small ascites CTH negative; Abd US: GB thickening, pericholecystic fluid     Perchole drain in place continues to drain total output overnight 133.  Fever today 38.8    duplex LE negative    Patient with persistent abdominal pain, refusing tube feeds and medications, Psych consulted   CTA A/P ordered to r/o mesenteric ischemia 2/2 persistent anorexia, nausea, vomiting. Revealed:  Evaluation of the mesenteric vessels is limited by streak artifact from LVAD. There appears to be severe stenosis of the proximal SMA; abdominal mesenteric doppler is recommended for further evaluation. 2.  No small bowel findings to suggest acute mesenteric ischemia. 3.  Focal dissections involving the right and left common iliac arteries.  8/15: Cultures resulted BC 1/2 +GPC in clusters, SC enterobacter; mesenteric duplex: borderline stenosis of proximal SMA  : CT C/A/P noncon: Nondular opacities in R lung apex w/cavitation, abd nl  :  Continue current care, treatment of thyrotoxicosis with medications as per endocrine, d/c ABX as per team.    RUQ sono: Contracted gallbladder with cholecystostomy tube in place.  9/10 failed SMA stent, L fem PSA, s/p 3U PRCB   CTA Abd/Pelvis L RP hematoma    Trach decannulated    intubated fro resp distress for increased WOB, LL pneumonia; CT C/A/P: progressive ISABELLE opacities and L consolidations, multifocal pneumonia    TTE (EF 25%, decreased RV, mod MR)   10/3 VT -> Lidocaine gtt   10/4 Trach size 6 DCT cuff  10/5 CT abd (neg for intra-abd abcess, severe stenosis of prox SMA and b/l iliac arteries)    Today:  Plan for CPAP trials  Tube feeds on hold for 4 episodes of vomitting  Plan for palliative care discussion follow up on 10/11    REVIEW OF SYSTEMS:  Unable to obtain due to patients trach     ICU Vital Signs Last 24 Hrs  T(C): 36.9 (09 Oct 2021 08:00), Max: 37.1 (09 Oct 2021 00:00)  T(F): 98.4 (09 Oct 2021 08:00), Max: 98.8 (09 Oct 2021 00:00)  HR: 110 (09 Oct 2021 10:00) (85 - 163)  BP: --  BP(mean): --  ABP: 114/84 (09 Oct 2021 10:00) (89/72 - 130/90)  ABP(mean): 98 (09 Oct 2021 10:00) (79 - 107)  RR: 28 (09 Oct 2021 10:00) (16 - 38)  SpO2: 100% (09 Oct 2021 10:00) (99% - 100%)      Physical Exam:  Gen:  NAD  CNS: moves all extremities to command on low dose sedation   Neck: no JVD, +trach   RES : coarse breath sounds, no wheezing    CVS: +LVAD hum   Abd: Soft. Dawn drain with bilious fluid. Positive BS throughout. Mild RUQ abdominal tenderness by perc dawn.  : Landrum intact draining urine without hematuria.  Skin: No rash, erythema, cyanosis.  Vasc: Warm and well-perfused.  Ext:  no edema    ============================I/O===========================  I&O's Detail    08 Oct 2021 07:01  -  09 Oct 2021 07:00  --------------------------------------------------------  IN:    Dexmedetomidine: 12.4 mL    dextrose 5% + sodium chloride 0.45%: 300 mL    Enteral Tube Flush: 75 mL    IV PiggyBack: 550 mL    Miscellaneous Tube Feedin mL    sodium chloride 0.9%: 190 mL  Total IN: 1327.4 mL    OUT:    Drain (mL): 400 mL    Indwelling Catheter - Urethral (mL): 1775 mL    Propofol: 0 mL    Vasopressin: 0 mL  Total OUT: 2175 mL    Total NET: -847.6 mL      09 Oct 2021 07:01  -  09 Oct 2021 11:08  --------------------------------------------------------  IN:    dextrose 5% + sodium chloride 0.45%: 120 mL    IV PiggyBack: 50 mL    sodium chloride 0.9%: 40 mL  Total IN: 210 mL    OUT:    Indwelling Catheter - Urethral (mL): 110 mL    Miscellaneous Tube Feedin mL  Total OUT: 110 mL    Total NET: 100 mL           ============================ LABS =========================                               11.3   16.10 )-----------( 230      ( 09 Oct 2021 00:18 )             34.4     10-09    140  |  103  |  15  ----------------------------<  93  3.2<L>   |  22  |  0.39<L>    Ca    10.6<H>      09 Oct 2021 00:18  Phos  2.6     10-09  Mg     1.7     10-09    TPro  8.6<H>  /  Alb  4.4  /  TBili  1.1  /  DBili  x   /  AST  25  /  ALT  17  /  AlkPhos  166<H>  10-09      ABG - ( 09 Oct 2021 00:08 )  pH, Arterial: 7.44  pH, Blood: x     /  pCO2: 40    /  pO2: 190   / HCO3: 27    / Base Excess: 2.8   /  SaO2: 99.5               ======================Micro/Rad/Cardio=================  Culture: Reviewed   CXR: Reviewed  Echo:Reviewed  ======================================================  PAST MEDICAL & SURGICAL HISTORY:  CHF (congestive heart failure)    CAD (coronary artery disease)    Depression    Pleural effusion    History of 2019 novel coronavirus disease (COVID-19)  2020    Hemorrhoids    Bleeding hemorrhoids    Peripheral arterial disease    Claudication    BPH with urinary obstruction    ACC/AHA stage D systolic heart failure    Anticoagulation goal of INR 2.0 to 2.5    Falls    Clavicle fracture    CKD (chronic kidney disease), stage III    Iron deficiency anemia    H/O epistaxis    Vertigo    GI bleed    S/P TVR (tricuspid valve repair)    S/P ventricular assist device    S/P endoscopy      ====================ASSESSMENT ==============  Stage D Nonischemic Cardiomyopathy, Status Post HM2 on 2017    Cardiogenic shock  Hemodynamic instability   Acute hypoxemic respiratory failure s/p trach , decannulated on ; Reintubated on    GI bleed , Status Post Enteroscopy   Anemia, in setting of melena   Chronic Kidney Disease  Stress hyperglycemia   C.diff positive on    Hypovolemic shock  Septic shock  Leukocytosis  GB thickening/percholecystic s/p perc choley by IT   SMA stenosis  Serratia/citrobacter pneumonia   Stenotrophomonas pneumonia   Enterobacter pneumonia   Nasuea/vomiting  Deconditioning    Plan:  ====================== NEUROLOGY=====================  Non-focal, continue to monitor neuro status  Tylenol PRN for analgesia     acetaminophen    Suspension .. 650 milliGRAM(s) Enteral Tube every 6 hours PRN Mild Pain (1 - 3)  dexMEDEtomidine Infusion 1.5 MICROgram(s)/kG/Hr (23.1 mL/Hr) IV Continuous <Continuous>    ==================== RESPIRATORY======================  Acute hypoxemic respiratory failure s/p #8 shiley trach on ; decannulated on ; Reintubated on ; trach size 6 DCT cuff on 10/4   Continue close monitoring of respiratory rate and breathing pattern, following of ABG’s with A-line monitoring, continuous pulse oximetry monitoring.   CPAP trials as tolerated    Mechanical Ventilation:  Mode: AC/ CMV (Assist Control/ Continuous Mandatory Ventilation)  RR (machine): 12  TV (machine): 500  FiO2: 40  PEEP: 5  ITime: 1  MAP: 10  PIP: 22      ====================CARDIOVASCULAR==================  Stage D Nonischemic Cardiomyopathy, Status Post HM2 on 2017; LVAD settings 9200, flow 5.8  TTE 10/4:  Severely decreased LV systolic function, Diffuse hypokinesis. Mild AR. No pericardial effusion   VT on 10/4, s/p Lidocaine drip, continue close tele monitoring   Pressor support with IV Vasopressin, weaned off  Holding off on Propranolol for now given recent pressor requirement      ===================HEMATOLOGIC/ONC ===================  CTA A/P on  +L RP hematoma   Acute blood loss anemia, H/H 10.9/33.7, S/P 2U of pRBCs yesterday and monitor CBC   Thrombocytopenia, possible in setting of sepsis, now starting to trend up, 130->179, Rest of labs not consistent with DIC  Holding coumadin and ASA given recurrent GI bleeding, continue monitoring LDH   Monitor H&H/Plts     ===================== RENAL =========================  Hx of CKD, continue monitoring urine output, I&OS, BUN/Cr   Euvolemic, even fluid balance, currently off diuretics.    Replete lytes PRN. Keep K> 4 and Mg >2  C/w Aldactone for euvolemic fluid balance goal    spironolactone 12.5 milliGRAM(s) Oral every 12 hours    ==================== GASTROINTESTINAL===================  Held TFs this AM for line changes   CT A/P 10/5 neg for intra-abd abcess, severe stenosis of prox SMA and b/l iliac arteries  CTA A/P : Evaluation of the mesenteric vessels is limited by streak artifact from LVAD. There appears to be severe stenosis of the proximal SMA; abdominal mesenteric doppler is recommended for further evaluation. 2.  No small bowel findings to suggest acute mesenteric ischemia. 3.  Focal dissections involving the right and left common iliac arteries.  Mesenteric duplex on 8/15 borderline stenosis of proximal SMA, s/p failed SMA stent, L fem RP hematoma on 9/10; No intervention for cholecystostomy tube/PEJ at this time   Reglan for gut motility  PRN Simethicone for gas  TF on hold, given recent overnight vomitting.  Hydration w/ d51/2ns    albumin human  5% IVPB 250 milliLiter(s) IV Intermittent once  multivitamin 1 Tablet(s) Oral daily  GI prophylaxis, pantoprazole  Injectable 40 milliGRAM(s) IV Push every 12 hours  simethicone 80 milliGRAM(s) Chew every 8 hours PRN Gas  sodium chloride 0.9%. 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       Per endocrine      - c/w Methimazole, adjust based on labs        - Check Free T4 and total T3     insulin lispro (ADMELOG) corrective regimen sliding scale   SubCutaneous every 6 hours  methimazole 20 milliGRAM(s) Oral <User Schedule>    ========================INFECTIOUS DISEASE================  Afebrile, WBC rising 11.59->15.48   Continue trending WBC and monitoring fever curve   CT C/A/P : progressive ISABELLE opacities and L consolidations, multifocal pneumonia  BCx  + Serratia marcescens, BCx 1/2 on 10/1 +Enterobacter cloacae complex, BCx 10/3 +GNR, repeat BCx x2 10/5 NGTD   Coverage with Meropenem that completes 10/10  Vancomycin solution for C.diff prophylaxis  Will f/u antibiotic regimen with ID    meropenem  IVPB 1000 milliGRAM(s) IV Intermittent every 8 hours  vancomycin    Solution 125 milliGRAM(s) Oral every 12 hours      Patient requires continuous monitoring with bedside rhythm monitoring, pulse ox monitoring, and intermittent blood gas analysis. Care plan discussed with ICU care team. Patient remained critical and at risk for life threatening decompensation.      RICKY HAMPTON  MRN-[id number]  Patient is a 66y old  Male who presents with a chief complaint of Anemia, Supratherapeutic INR, Dark Stools (07 Oct 2021 07:48)    HPI:  65M PMH ACC/AHA stage D HF due to NICM HM2 LVAD , TV annuloplasty ring 17 as destination therapy due to severe peripheral artery disease with significant stenosis  SIADH, Depression, CKD-3 with hyperkalemia, past E. coli UTIs, driveline drainage (21) and COVID-19 (back in 2020)  He was recently seen in clinic where he complained of abdominal pain and dark stools w constipation back in May. He presents to [facility] ER today weakness and fatigue, moderate and + Black stools for three days, on coumadin secondary to warfarin use in the setting of an LVAD. Patient has required transfusions for GIB in the past. Mostly recently back in 2021 pt had anemia with dark stools. No interventions was done at that time. However Last Endoscopy was done in 2020 (negative). Today labs show patient is anemic with H/H of 4.5/16.3,. INR is 8.84 MAP in the 90s, Temp 35.1. He denies any chest pain, shortness of breath, dizziness, abd pain, nausea or vomiting.       (2021 16:57)      Surgery/Hospital Course:   admit for melena w/ anemia, INR 8.84   6/15 Capsul study (+) for small bowel bleed, balloon endoscopy (old blood in prox ileum); post EGD - septic w/ L opacity, re-intubated for concern for aspiration, TTE (Mod MR, decrease biV w/ interventricular septum boweing towards R)   bronch    +C Diff    CT C/A/P: Fluid filled colon which may be 2/2 rapid transit. Small bilateral pleffs with associates. Compressive atelectasis New ISABELLE & LLL  parenchymal opacities, suspicious for pneumonia. Moderate stenosis in the proximal superior mesenteric artery.    #8 Shiley trach at bedside    LVAD speed increased to 9200   Bronch   TC since . Patient transferred to SDU.    INR today 2.64.  H&H 7.3/24 this AM.  Will repeat CBC at noon, and will send stool guaiac Patient with persistent abdominal tenderness, rate of tube feeds decreased.  No nausea/vomiting.     INR today 2.4. H&H 9.1/28.6 low flow overnight /N&V, refusing Tube feeds on D5 1/2 normal  @50 cc/hr   INR 2.69  H&H 7.7/.1 refusing Tube feeds on D5 1/2 normal  @50 cc/hr. This am + BM Melena Dr Oneill HF  aware- PRBC x1  GI team consulted -  NPO  plan on study in am-  D/w Dr Cadet Patient  to return to CTU for further management; 1PRBCS    Post op INR 2.2 today.  No bleeding. BC + for SM.  Pt is hypotensive requiring pressor and inotropic support.  ID follow up today on Cefepime will follow.   R PTC for PTX    CT C/A/P: sub q emphysema in R chest wall, GGO RUL, small ascites CTH negative; Abd US: GB thickening, pericholecystic fluid     Perchole drain in place continues to drain total output overnight 133.  Fever today 38.8    duplex LE negative    Patient with persistent abdominal pain, refusing tube feeds and medications, Psych consulted   CTA A/P ordered to r/o mesenteric ischemia 2/2 persistent anorexia, nausea, vomiting. Revealed:  Evaluation of the mesenteric vessels is limited by streak artifact from LVAD. There appears to be severe stenosis of the proximal SMA; abdominal mesenteric doppler is recommended for further evaluation. 2.  No small bowel findings to suggest acute mesenteric ischemia. 3.  Focal dissections involving the right and left common iliac arteries.  8/15: Cultures resulted BC 1/2 +GPC in clusters, SC enterobacter; mesenteric duplex: borderline stenosis of proximal SMA  : CT C/A/P noncon: Nondular opacities in R lung apex w/cavitation, abd nl  :  Continue current care, treatment of thyrotoxicosis with medications as per endocrine, d/c ABX as per team.    RUQ sono: Contracted gallbladder with cholecystostomy tube in place.  9/10 failed SMA stent, L fem PSA, s/p 3U PRCB   CTA Abd/Pelvis L RP hematoma    Trach decannulated    intubated fro resp distress for increased WOB, LL pneumonia; CT C/A/P: progressive ISABELLE opacities and L consolidations, multifocal pneumonia    TTE (EF 25%, decreased RV, mod MR)   10/3 VT -> Lidocaine gtt   10/4 Trach size 6 DCT cuff  10/5 CT abd (neg for intra-abd abcess, severe stenosis of prox SMA and b/l iliac arteries)    Today:  Plan for CPAP trials  Tube feeds on hold for 4 episodes of vomitting  Plan for palliative care discussion follow up on 10/11    REVIEW OF SYSTEMS:  Unable to obtain due to patients trach     ICU Vital Signs Last 24 Hrs  T(C): 36.9 (09 Oct 2021 08:00), Max: 37.1 (09 Oct 2021 00:00)  T(F): 98.4 (09 Oct 2021 08:00), Max: 98.8 (09 Oct 2021 00:00)  HR: 110 (09 Oct 2021 10:00) (85 - 163)  BP: --  BP(mean): --  ABP: 114/84 (09 Oct 2021 10:00) (89/72 - 130/90)  ABP(mean): 98 (09 Oct 2021 10:00) (79 - 107)  RR: 28 (09 Oct 2021 10:00) (16 - 38)  SpO2: 100% (09 Oct 2021 10:00) (99% - 100%)      Physical Exam:  Gen:  NAD  CNS: moves all extremities to command on low dose sedation   Neck: no JVD, +trach   RES : coarse breath sounds, no wheezing    CVS: +LVAD hum   Abd: Soft. Dawn drain with bilious fluid. Positive BS throughout. Mild RUQ abdominal tenderness by perc dawn.  : Landrum intact draining urine without hematuria.  Skin: No rash, erythema, cyanosis.  Vasc: Warm and well-perfused.  Ext:  no edema    ============================I/O===========================  I&O's Detail    08 Oct 2021 07:01  -  09 Oct 2021 07:00  --------------------------------------------------------  IN:    Dexmedetomidine: 12.4 mL    dextrose 5% + sodium chloride 0.45%: 300 mL    Enteral Tube Flush: 75 mL    IV PiggyBack: 550 mL    Miscellaneous Tube Feedin mL    sodium chloride 0.9%: 190 mL  Total IN: 1327.4 mL    OUT:    Drain (mL): 400 mL    Indwelling Catheter - Urethral (mL): 1775 mL    Propofol: 0 mL    Vasopressin: 0 mL  Total OUT: 2175 mL    Total NET: -847.6 mL      09 Oct 2021 07:01  -  09 Oct 2021 11:08  --------------------------------------------------------  IN:    dextrose 5% + sodium chloride 0.45%: 120 mL    IV PiggyBack: 50 mL    sodium chloride 0.9%: 40 mL  Total IN: 210 mL    OUT:    Indwelling Catheter - Urethral (mL): 110 mL    Miscellaneous Tube Feedin mL  Total OUT: 110 mL    Total NET: 100 mL           ============================ LABS =========================                               11.3   16.10 )-----------( 230      ( 09 Oct 2021 00:18 )             34.4     10-09    140  |  103  |  15  ----------------------------<  93  3.2<L>   |  22  |  0.39<L>    Ca    10.6<H>      09 Oct 2021 00:18  Phos  2.6     10-09  Mg     1.7     10-09    TPro  8.6<H>  /  Alb  4.4  /  TBili  1.1  /  DBili  x   /  AST  25  /  ALT  17  /  AlkPhos  166<H>  10-09      ABG - ( 09 Oct 2021 00:08 )  pH, Arterial: 7.44  pH, Blood: x     /  pCO2: 40    /  pO2: 190   / HCO3: 27    / Base Excess: 2.8   /  SaO2: 99.5               ======================Micro/Rad/Cardio=================  Culture: Reviewed   CXR: Reviewed  Echo:Reviewed  ======================================================  PAST MEDICAL & SURGICAL HISTORY:  CHF (congestive heart failure)    CAD (coronary artery disease)    Depression    Pleural effusion    History of 2019 novel coronavirus disease (COVID-19)  2020    Hemorrhoids    Bleeding hemorrhoids    Peripheral arterial disease    Claudication    BPH with urinary obstruction    ACC/AHA stage D systolic heart failure    Anticoagulation goal of INR 2.0 to 2.5    Falls    Clavicle fracture    CKD (chronic kidney disease), stage III    Iron deficiency anemia    H/O epistaxis    Vertigo    GI bleed    S/P TVR (tricuspid valve repair)    S/P ventricular assist device    S/P endoscopy      ====================ASSESSMENT ==============  Stage D Nonischemic Cardiomyopathy, Status Post HM2 on 2017    Cardiogenic shock  Hemodynamic instability   Acute hypoxemic respiratory failure s/p trach , decannulated on ; Reintubated on    GI bleed , Status Post Enteroscopy   Anemia, in setting of melena   Chronic Kidney Disease  Stress hyperglycemia   C.diff positive on    Hypovolemic shock  Septic shock  Leukocytosis  GB thickening/percholecystic s/p perc choley by IT   SMA stenosis  Serratia/citrobacter pneumonia   Stenotrophomonas pneumonia   Enterobacter pneumonia   Nasuea/vomiting  Deconditioning    Plan:  ====================== NEUROLOGY=====================  Non-focal, continue to monitor neuro status  Tylenol PRN for analgesia     acetaminophen    Suspension .. 650 milliGRAM(s) Enteral Tube every 6 hours PRN Mild Pain (1 - 3)  dexMEDEtomidine Infusion 1.5 MICROgram(s)/kG/Hr (23.1 mL/Hr) IV Continuous <Continuous>    ==================== RESPIRATORY======================  Acute hypoxemic respiratory failure s/p #8 shiley trach on ; decannulated on ; Reintubated on ; trach size 6 DCT cuff on 10/4   Continue close monitoring of respiratory rate and breathing pattern, following of ABG’s with A-line monitoring, continuous pulse oximetry monitoring.   CPAP trials as tolerated    Mechanical Ventilation:  Mode: AC/ CMV (Assist Control/ Continuous Mandatory Ventilation)  RR (machine): 12  TV (machine): 500  FiO2: 40  PEEP: 5  ITime: 1  MAP: 10  PIP: 22      ====================CARDIOVASCULAR==================  Stage D Nonischemic Cardiomyopathy, Status Post HM2 on 2017; LVAD settings 9200, flow 5.8  TTE 10/4:  Severely decreased LV systolic function, Diffuse hypokinesis. Mild AR. No pericardial effusion   VT on 10/4, s/p Lidocaine drip, continue close tele monitoring   Pressor support with IV Vasopressin, weaned off  Holding off on Propranolol for now given recent pressor requirement      ===================HEMATOLOGIC/ONC ===================  CTA A/P on  +L RP hematoma   Acute blood loss anemia, H/H 10.9/33.7, S/P 2U of pRBCs yesterday and monitor CBC   Thrombocytopenia, possible in setting of sepsis, now starting to trend up, 130->179, Rest of labs not consistent with DIC  Holding coumadin and ASA given recurrent GI bleeding, continue monitoring LDH   Monitor H&H/Plts     ===================== RENAL =========================  Hx of CKD, continue monitoring urine output, I&OS, BUN/Cr   Euvolemic, even fluid balance, currently off diuretics.    Replete lytes PRN. Keep K> 4 and Mg >2  C/w Aldactone for euvolemic fluid balance goal    spironolactone 12.5 milliGRAM(s) Oral every 12 hours    ==================== GASTROINTESTINAL===================  Held TFs this AM for line changes   CT A/P 10/5 neg for intra-abd abcess, severe stenosis of prox SMA and b/l iliac arteries  CTA A/P : Evaluation of the mesenteric vessels is limited by streak artifact from LVAD. There appears to be severe stenosis of the proximal SMA; abdominal mesenteric doppler is recommended for further evaluation. 2.  No small bowel findings to suggest acute mesenteric ischemia. 3.  Focal dissections involving the right and left common iliac arteries.  Mesenteric duplex on 8/15 borderline stenosis of proximal SMA, s/p failed SMA stent, L fem RP hematoma on 9/10; No intervention for cholecystostomy tube/PEJ at this time   Reglan for gut motility  PRN Simethicone for gas  TF on hold, given recent overnight vomitting.  Hydration w/ d51/2ns    albumin human  5% IVPB 250 milliLiter(s) IV Intermittent once  multivitamin 1 Tablet(s) Oral daily  GI prophylaxis, pantoprazole  Injectable 40 milliGRAM(s) IV Push every 12 hours  simethicone 80 milliGRAM(s) Chew every 8 hours PRN Gas  sodium chloride 0.9%. 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       Per endocrine      - c/w Methimazole, adjust based on labs        - Check Free T4 and total T3     insulin lispro (ADMELOG) corrective regimen sliding scale   SubCutaneous every 6 hours  methimazole 20 milliGRAM(s) Oral <User Schedule>    ========================INFECTIOUS DISEASE================  Afebrile, WBC rising 11.59->15.48   Continue trending WBC and monitoring fever curve   CT C/A/P : progressive ISABELLE opacities and L consolidations, multifocal pneumonia  BCx  + Serratia marcescens, BCx 1/2 on 10/1 +Enterobacter cloacae complex, BCx 10/3 +GNR, repeat BCx x2 10/5 NGTD   Coverage with Meropenem that completes 10/16  Vancomycin solution for C.diff prophylaxis  Will f/u antibiotic regimen with ID    meropenem  IVPB 1000 milliGRAM(s) IV Intermittent every 8 hours  vancomycin    Solution 125 milliGRAM(s) Oral every 12 hours      Patient requires continuous monitoring with bedside rhythm monitoring, pulse ox monitoring, and intermittent blood gas analysis. Care plan discussed with ICU care team. Patient remained critical and at risk for life threatening decompensation.

## 2021-10-09 NOTE — PROGRESS NOTE ADULT - SUBJECTIVE AND OBJECTIVE BOX
ENT ISSUE/POD: POD #5 for trach     HPI: 67 y/o male POD #5 for tracheostomy #6 DCT cuffed in place 2/2 respiratory failure. Pt is doing well on the vent. no reported issues.     PAST MEDICAL & SURGICAL HISTORY:  CHF (congestive heart failure)    CAD (coronary artery disease)    Depression    Pleural effusion    History of 2019 novel coronavirus disease (COVID-19)  april 2020    Hemorrhoids    Bleeding hemorrhoids    Peripheral arterial disease    Claudication    BPH with urinary obstruction    ACC/AHA stage D systolic heart failure    Anticoagulation goal of INR 2.0 to 2.5    Falls    Clavicle fracture    CKD (chronic kidney disease), stage III    Iron deficiency anemia    H/O epistaxis    Vertigo    GI bleed    S/P TVR (tricuspid valve repair)    S/P ventricular assist device    S/P endoscopy      Allergies    Amiodarone Hydrochloride (Other (Severe))    Intolerances      MEDICATIONS  (STANDING):  chlorhexidine 0.12% Liquid 15 milliLiter(s) Oral Mucosa two times a day  chlorhexidine 2% Cloths 1 Application(s) Topical <User Schedule>  dextrose 5% + sodium chloride 0.45%. 1000 milliLiter(s) (30 mL/Hr) IV Continuous <Continuous>  insulin lispro (ADMELOG) corrective regimen sliding scale   SubCutaneous every 6 hours  meropenem  IVPB 1000 milliGRAM(s) IV Intermittent every 8 hours  methimazole 20 milliGRAM(s) Oral every 8 hours  metoclopramide Injectable 10 milliGRAM(s) IV Push every 8 hours  multivitamin 1 Tablet(s) Oral daily  ondansetron Injectable 4 milliGRAM(s) IV Push once  pantoprazole  Injectable 40 milliGRAM(s) IV Push every 12 hours  predniSONE   Tablet 40 milliGRAM(s) Oral daily  sodium chloride 0.9%. 1000 milliLiter(s) (10 mL/Hr) IV Continuous <Continuous>  spironolactone 12.5 milliGRAM(s) Oral every 12 hours  thiamine 100 milliGRAM(s) Oral daily  vancomycin    Solution 125 milliGRAM(s) Oral every 12 hours    MEDICATIONS  (PRN):  acetaminophen    Suspension .. 650 milliGRAM(s) Enteral Tube every 6 hours PRN Mild Pain (1 - 3)  fentaNYL    Injectable 12.5 MICROGram(s) IV Push every 3 hours PRN Moderate to severe pain  simethicone 80 milliGRAM(s) Chew every 8 hours PRN Gas      social history: see consult     family history: see consult     ROS:   ENT: all negative except as noted in HPI   Pulm: denies SOB, cough, hemoptysis  Neuro: denies numbness/tingling, loss of sensation  Endo: denies heat/cold intolerance, excessive sweating      Vital Signs Last 24 Hrs  T(C): 36.9 (09 Oct 2021 08:00), Max: 37.1 (09 Oct 2021 00:00)  T(F): 98.4 (09 Oct 2021 08:00), Max: 98.8 (09 Oct 2021 00:00)  HR: 107 (09 Oct 2021 10:32) (85 - 163)  BP: --  BP(mean): --  RR: 28 (09 Oct 2021 10:00) (16 - 38)  SpO2: 99% (09 Oct 2021 10:32) (99% - 100%)                          11.3   16.10 )-----------( 230      ( 09 Oct 2021 00:18 )             34.4    10-09    140  |  103  |  15  ----------------------------<  93  3.2<L>   |  22  |  0.39<L>    Ca    10.6<H>      09 Oct 2021 00:18  Phos  2.6     10-09  Mg     1.7     10-09    TPro  8.6<H>  /  Alb  4.4  /  TBili  1.1  /  DBili  x   /  AST  25  /  ALT  17  /  AlkPhos  166<H>  10-09       PHYSICAL EXAM:  Gen: NAD, well-developed  Head: Normocephalic, Atraumatic  Face: no edema/erythema/fluctuance  Eyes:  no scleral injection  Nose: Nares bilaterally patent, no discharge  Mouth: No Stridor / Drooling / Trismus.  Mucosa moist, tongue/uvula midline, oropharynx clear  Neck: #6 DCT inflated cuff with minimal serosanguinous oozing from stoma, no active bleeding, sutures x4 and umbilical tie in place.  No lymphadenopathy, trachea midline, no masses  Resp: ventilating well  CV: no peripheral edema/cyanosis

## 2021-10-09 NOTE — PROGRESS NOTE ADULT - ASSESSMENT
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 coli UTIs, driveline drainage (1/7/21) and COVID-19, here initially for GIB prolonged hospital course, s/p tracheostomy, acalculous cholecystitis s/p perc dawn. Patient has persistent intermittent abdominal pain, and was being evaluated for chronic mesenteric ischemia vs SMA syndrome.  8/13 bld cxs positive. TPN was consulted for Protein Calorie Malnutrition, Prealb 11, prior a1c 5.6, tg 141. Mesenteric duplex showied borderline stenosis of prox SMA,  Pt now s/p Mesenteric angiogram 9/10 unable to place stent. Pt had been tolerating feeds but with intermittent abd pain. SLP eval had recommended NPO status.    -Tube feeds- Vital 1.5 previously tolerated at 45cc/hr (goal 65cc/hr) currently on hold  - Nutritional assessment. Obtain weekly prealbumin  - Suspect hyperthyroidism contributing in part to metabolic issues ie. cAMP effects GI motility, hypercalcemia, tachycardia; with some clinical improvement, further management as per endo  -Electrolyte Imbalance Risk. Daily CMP, Mg, Phos, K.  h/o hypomagnesemia- rec replete as per CTU  -h/o abdominal pain/vomiting- as above, Zofran prn for nausea; Reglan for gut motility; Vascular input noted, s/p Mesenteric Angiogram 9/10, unable to pass stent. f/u possible OR plan with Vascular for next week.  -Anemia- rec iron supp when feasible  -Management as per CTU; will remain available as needed    plan discussed with Dr. Soliz and Dr ANNE Tang, agreeable with above    TPN pager [phone], spectra [phone]  M-F 6A-4P, Weekends and holidays 8A-12P

## 2021-10-09 NOTE — PROGRESS NOTE ADULT - SUBJECTIVE AND OBJECTIVE BOX
[facility] NUTRITION SUPPORT-- FOLLOW UP NOTE  --------------------------------------------------------------------------------      24 hour events/subjective:      TPN originally consulted for nutrition support, however TPN was not indicated as pt was tolerating TFs close to goal - which was dc'd yesterday because of emesis. Plan to hold off restarting TFs for now.      PAST HISTORY  --------------------------------------------------------------------------------  No significant changes to PMH, PSH, FHx, SHx, unless otherwise noted    ALLERGIES & MEDICATIONS  --------------------------------------------------------------------------------  Allergies    Amiodarone Hydrochloride (Other (Severe))    Intolerances      Standing Inpatient Medications  chlorhexidine 0.12% Liquid 15 milliLiter(s) Oral Mucosa two times a day  chlorhexidine 2% Cloths 1 Application(s) Topical <User Schedule>  dextrose 5% + sodium chloride 0.45%. 1000 milliLiter(s) IV Continuous <Continuous>  insulin lispro (ADMELOG) corrective regimen sliding scale   SubCutaneous every 6 hours  meropenem  IVPB 1000 milliGRAM(s) IV Intermittent every 8 hours  methimazole 20 milliGRAM(s) Oral every 8 hours  metoclopramide Injectable 10 milliGRAM(s) IV Push every 8 hours  multivitamin 1 Tablet(s) Oral daily  pantoprazole  Injectable 40 milliGRAM(s) IV Push every 12 hours  predniSONE   Tablet 40 milliGRAM(s) Oral daily  sodium chloride 0.9%. 1000 milliLiter(s) IV Continuous <Continuous>  spironolactone 12.5 milliGRAM(s) Oral every 12 hours  thiamine 100 milliGRAM(s) Oral daily  vancomycin    Solution 125 milliGRAM(s) Oral every 12 hours    PRN Inpatient Medications  acetaminophen    Suspension .. 650 milliGRAM(s) Enteral Tube every 6 hours PRN  fentaNYL    Injectable 12.5 MICROGram(s) IV Push every 3 hours PRN  simethicone 80 milliGRAM(s) Chew every 8 hours PRN      REVIEW OF SYSTEMS  --------------------------------------------------------------------------------  Gen: fatigue, fevers/chills, weakness  Skin: No rashes  Head/Eyes/Ears/Mouth: No headache;No sore throat  Respiratory: No dyspnea, cough,   CV: No chest pain, PND, orthopnea  GI: No abdominal pain, diarrhea, constipation, nausea, vomiting  Transplant: No pain  : No increased frequency, dysuria, hematuria, nocturia  MSK: No joint pain/swelling; no back pain; no edema  Neuro: No dizziness/lightheadedness, weakness, seizures, numbness, tingling  Psych: No significant nervousness, anxiety, stress, depression    All other systems were reviewed and are negative, except as noted.          LABS/STUDIES  --------------------------------------------------------------------------------              11.3   16.10 >-----------<  230      [10-09-21 @ 00:18]              34.4     140  |  103  |  15  ----------------------------<  93      [10-09-21 @ 00:18]  3.2   |  22  |  0.39        Ca     10.6     [10-09-21 @ 00:18]      Mg     1.7     [10-09-21 @ 00:18]      Phos  2.6     [10-09-21 @ 00:18]    TPro  8.6  /  Alb  4.4  /  TBili  1.1  /  DBili  x   /  AST  25  /  ALT  17  /  AlkPhos  166  [10-09-21 @ 00:18]              [10-09-21 @ 00:18]    Ca ionizedBlood Gas Arterial - Calcium, Ionized: 1.38 mmol/L (10-09-21 @ 00:08)  Blood Gas Arterial - Calcium, Ionized: 1.36 mmol/L (10-08-21 @ 00:33)    Creatinine Trend:  POC glucoseGlucose, Serum: 93 mg/dL (10-09-21 @ 00:18)    PrealbuminPrealbumin, Serum: 11 mg/dL (08-16-21 @ 04:01)  Prealbumin, Serum: 10 mg/dL (08-15-21 @ 13:53)    Triglycerides    VITALS/PHYSICAL EXAM  --------------------------------------------------------------------------------  T(C): 36.9 (10-09-21 @ 08:00), Max: 37.1 (10-09-21 @ 00:00)  HR: 123 (10-09-21 @ 11:53) (85 - 163)  BP: --  RR: 28 (10-09-21 @ 11:16) (16 - 38)  SpO2: 100% (10-09-21 @ 11:53) (99% - 100%)  Wt(kg): --        10-08-21 @ 07:01  -  10-09-21 @ 07:00  --------------------------------------------------------  IN: 1327.4 mL / OUT: 2175 mL / NET: -847.6 mL    10-09-21 @ 07:01  -  10-09-21 @ 11:58  --------------------------------------------------------  IN: 210 mL / OUT: 110 mL / NET: 100 mL      Physical Exam:    Gen: laying in bed in NAD  HEENT: NCAT PERRL   Neck: trachea collar in place, no noted JVD  Chest: respirations non labored  Abd: soft non distended  Extrem: WWP no edema noted  Neuro: awake alert appropriate  Skin: warm, no rashes noted    .  .  .

## 2021-10-09 NOTE — PROGRESS NOTE ADULT - SUBJECTIVE AND OBJECTIVE BOX
RICKY JOINT  MRN#: [id number]  Subjective:  Pulmonary progress  : recurrent Acute hypoxic respiratory Failure ,aspiration pneumonia, NICM  ,   65M PMH ACC/AHA stage D HF due to NICM HM2 LVAD , TV annuloplasty ring 17 as destination therapy due to severe peripheral artery disease with significant stenosis  SIADH, Depression, CKD-3 with hyperkalemia, past E. coli UTIs, driveline drainage (21) and COVID-19 (back in 2020)  He was recently seen in clinic where he complained of abdominal pain and dark stools w constipation back in May. He presents to [facility] ER today weakness and fatigue, moderate and + Black stools for three days, on coumadin secondary to warfarin use in the setting of an LVAD. Patient has required transfusions for GIB in the past. Mostly recently back in 2021 pt had anemia with dark stools. No interventions was done at that time. However Last Endoscopy was done in 2020 (negative). Today labs show patient is anemic with H/H of 4.5/16.3,. INR is 8.84 MAP in the 90s, Temp 35.1. He denies any chest pain, shortness of breath, dizziness, abd pain, nausea or vomiting. found to have  rectal bleeding underwent endoscopy ,old blood in the proximal ileum ,  develop sepsis with LL opacity given Antibiotics , Extubated , reintubated , Bronchoscopy on Zosyn for LL pneumonia  and Amiodarone S/P TV Annuloplasty , patient remain intubated on full ventilatory support .S/P multiple units of blood transfusion , remain on full ventilatory support on Precedex and propofol , new central IJ line , diarrhea C diff. +ve on po vancomycin and IV Flagyl,  mildly distended belly , fever start on cefepime 2gm q 8 hrs S/P tracheostomy .  new RT Subclavian central line continue on contact  isolation ,S/P  C-diff antibiotics, no more diarrhea back on full support mechanical ventilator , chest x ray show improvement in LLL air space disease, more awake and responsive on tube feeding no more diarrhea ,  no nausea or vomiting or diarrhea still very weak and tired , back on tube feeding ,still on po vancomycin , getting PT and OT at bed side ,   , no SOB getting stronger , improve muscle tone patient transfer to monitor bed still on contact isolation for C-Difficel colitis on 50% FI02, and change to Suresh  tube feeding still loose stool . H/H drop significantly require blood transfusion , most likely GI bleeding , IV heparin D/C ,  H/H is stable ., patient develop TR sided  pneumothorax require chest tube placement , RT IJ central line  placed , develop fever shaking chills , blood culture positive for serratia marcescens , start on cefepime .the patient  become hypoxic and hypotensive placed on full ventilatory support and Vasopressin , levophed and Dobutamine ,S/P blood transfusion on meropenem and vancomycin ,   , on and  off pressors , occasional agitation on Precedex .S/P IR cholecystostomy tube drainage placement in the RT upper Quadrant , resume anticoagulation chest x ray noted C-PAP trail lasted only for 2 hrs , new RT SC line and D/C RT IJ line , RT pig tail cathter has been removed , tolerating C-PAP trial placed on trach. collar 50% FI02 GI consultant noted on NG tube feeding as tolerated , develop AF RVR S/P  bolus Amiodarone  back to regular sinus Rhythm , Flat Affect depressed , back on tube feeding Vital AF at 60 cc/ hr .still intermittent abdominal pain , no fever saturation is accepted  back on full ventilatory support ,   on methimazole for hyperthyroidism ,  condition is the same ,still C/O Abdominal pain , white count is improving , no chest pain or SOB ,  .placed on Reglan 10 mg TID for gastric motility , depressed , withdrawn. , S/P  mesenteric angiogram , unable to stent SMA S/P 3 units of blood transfusion   RT IJ in place reassessed for stent in the SMA by vascular,  dark stool stable H/H ,surgical opinion for possible Lap cholecystectomy. over night events noted develop respiratory distress, , rectal bleeding, require intubation placed on full ventilatory support , FI02 is 50% also became hemianosmically unstable require triple pressor support with levophed , vasopressin and norsynephrine, pan culture placed  on Vancomycin IV and PO  and Zosyn, sedated with propofol kept NPO , new central line RT and left IJ cathter placed . Diflucan Added .fever of 38.5 weaned off  vasopressin , all culture are negative, resume tube feeding ,  white count is improving , on meropenem, vancomycin solution  , on Precedex, no over night events , S/P  tracheostomy , bleeding receiving blood transfusion on vasopressin , no more bleeding , no fever , more awake and responsive ,tolerating tube feeding , ID and heart failure follow up appreciated , INR is supra therapeutic      (2021 16:57)    PAST MEDICAL & SURGICAL HISTORY:  CHF (congestive heart failure)    CAD (coronary artery disease)  Depression    Pleural effusion    History of 2019 novel coronavirus disease (COVID-19)  2020    Hemorrhoids    Bleeding hemorrhoids    Peripheral arterial disease    Claudication    BPH with urinary obstruction    ACC/AHA stage D systolic heart failure  Anticoagulation goal of INR 2.0 to 2.5    Falls    Clavicle fracture    CKD (chronic kidney disease), stage III    Iron deficiency anemia    H/O epistaxis    Vertigo    GI bleed    S/P TVR (tricuspid valve repair)    S/P ventricular assist device    S/P endoscopy    OBJECTIVE:  ICU Vital Signs Last 24 Hrs  T(C): 38.2 (2021 10:00), Max: 38.5 (2021 12:00)  T(F): 100.8 (2021 10:00), Max: 101.3 (2021 12:00)  HR: 65 (2021 10:00) (61 - 69)  BP: --  BP(mean): --  ABP: 105/67 (2021 10:00) (90/54 - 113/64)  ABP(mean): 77 (2021 10:00) (63 - 77)  RR: 20 (2021 10:00) (19 - 35)  SpO2: 99% (2021 10:00) (96% - 100%)       @ : @ 07:00  --------------------------------------------------------  IN: 2693.9 mL / OUT: 1415 mL / NET: 1278.9 mL     @ 07:  -   @ 10:49  --------------------------------------------------------  IN: 420.8 mL / OUT: 115 mL / NET: 305.8 mL  PHYSICAL EXAM: Daily   Elderly male intubated on full ventilatory support FI02 is 40% S/P tracheostomy off   vasopressin , Precedex , blood  transfusion   Daily Weight in k.4 (2021 00:00)  HEENT:     + NCAT  + EOMI  - Conjuctival edema   - Icterus   - Thrush   - ETT  + NGT/OGT  Neck:         + FROM  RT IJ , LT IJ  lines JVD  - Nodes - Masses + Mid-line trachea + Tracheostomy  Chest:            normal A-P diameter    Lungs:          + CTA   + Rhonchi    - Rales    - Wheezing + Decreased  LT BS   - Dullness R L  Cardiac:       + S1 + S2    + RRR   - Irregular   - S3  - S4    - Murmurs   - Rub   - Hamman’s sign   Abdomen:    + BS  + Soft + Non-tender - Distended - Organomegaly - PEG .cholecystostomy tube in place  Extremities:   - Cyanosis U/L   - Clubbing  U/L  + LE/UE Edema   + Capillary refill    + Pulses   Neuro:        - Awake   -  Alert   - Confused   - Lethargic   + Sedated  + Generalized Weakness  Skin:        - Rashes    - Erythema   + Normal incisions   + IV sites intact          HOSPITAL MEDICATIONS: All medications reviewed and analyzed  MEDICATIONS  (STANDING):  amiodarone    Tablet 200 milliGRAM(s) Oral daily  chlorhexidine 0.12% Liquid 15 milliLiter(s) Oral Mucosa every 12 hours  chlorhexidine 2% Cloths 1 Application(s) Topical <User Schedule>  dexmedetomidine Infusion 0.5 MICROgram(s)/kG/Hr (9.81 mL/Hr) IV Continuous <Continuous>  dextrose 50% Injectable 50 milliLiter(s) IV Push every 15 minutes  heparin  Infusion 400 Unit(s)/Hr (12.5 mL/Hr) IV Continuous <Continuous>  Hydromorphone  Injectable 0.5 milliGRAM(s) IV Push once  insulin lispro (ADMELOG) corrective regimen sliding scale   SubCutaneous every 6 hours  pantoprazole  Injectable 40 milliGRAM(s) IV Push every 12 hours  piperacillin/tazobactam IVPB.. 3.375 Gram(s) IV Intermittent every 8 hours  propofol Infusion 20 MICROgram(s)/kG/Min (9.42 mL/Hr) IV Continuous <Continuous>  sodium chloride 0.9% lock flush 3 milliLiter(s) IV Push every 8 hours  sodium chloride 0.9%. 1000 milliLiter(s) (10 mL/Hr) IV Continuous <Continuous>    MEDICATIONS  (PRN):  acetaminophen    Suspension .. 650 milliGRAM(s) Oral every 6 hours PRN Temp greater or equal to 38C (100.4F)    LABS: All Lab data reviewed and analyzed                         11.3   16.10 )-----------( 230      ( 09 Oct 2021 00:18 )             34.4   10-    140  |  103  |  15  ----------------------------<  93  3.2<L>   |  22  |  0.39<L>    Ca    10.6<H>      09 Oct 2021 00:18  Phos  2.6     10-  Mg     1.7     10-    TPro  8.6<H>  /  Alb  4.4  /  TBili  1.1  /  DBili  x   /  AST  25  /  ALT  17  /  AlkPhos  166<H>  10-    Ca    10.4      08 Oct 2021 00:43  Phos  2.2     10-08  Mg     1.5     10-    TPro  8.3  /  Alb  4.4  /  TBili  1.2  /  DBili  x   /  AST  17  /  ALT  15  /  AlkPhos  167<H>  10-    Ca    10.3      07 Oct 2021 00:57  Phos  2.4     10-07  Mg     1.9     10-    TPro  8.4<H>  /  Alb  4.2  /  TBili  1.3<H>  /  DBili  x   /  AST  21  /  ALT  17  /  AlkPhos  191<H>  10-    Ca    9.8      06 Oct 2021 00:26  Phos  2.7     10-06  Mg     1.9     10-    TPro  7.8  /  Alb  4.0  /  TBili  1.6<H>  /  DBili  x   /  AST  18  /  ALT  20  /  AlkPhos  177<H>  10-                                                                                                                                                                        PTT - ( 2021 04:52 )  PTT:45.2 sec LIVER FUNCTIONS - ( 2021 00:42 )  Alb: 3.4 g/dL / Pro: 6.7 g/dL / ALK PHOS: 213 U/L / ALT: 15 U/L / AST: 24 U/L / GGT: x           RADIOLOGY: - Reviewed and analyzed RT Pig tail cathter  , LVAD HM2, CT scan of abdomen reviewed result noted

## 2021-10-09 NOTE — PROGRESS NOTE ADULT - ASSESSMENT
52 y/o POD #5 for tracheostomy 2/2 respiratory failure. Pt is doing well, #6 DCT cuffed in place, on the vent, with minimal serosanguinous oozing from stoma, no active bleed. sutures x4 and umbilical tie in place.

## 2021-10-09 NOTE — PROGRESS NOTE ADULT - PROBLEM SELECTOR PLAN 1
- Continue vent per RT/CTU  - Suction prn   - Continue with trach site care, keep dry and clean   - Sutures will be removed 10/11  - will continue to follow

## 2021-10-09 NOTE — PROGRESS NOTE ADULT - ASSESSMENT
Assessment and Recommendation:   · Assessment	  Assessment and recommendation :  Recurrent Acute hypoxic respiratory Failure reintubated on full ventilator support FI02 is 40% S/P tracheostomy    Acute blood loss anemia S/P multiple  blood transfusion post tracheostomy   recurrent  septic shock  weaned off  vasopressin   hemorrhagic shock receiving 2 unit of blood transfusion   pan culture negative on meropenem  , po vancomycin   S/P cholecystostomy tube placement by IR    AF RVR back to regular sinus Rhythm   Non ischemic cardiomyopathy continue ACE inhibitor and B-Blockers   mesenteric ischemia failure to stent SMA , no plan for repeated trial   S/P 3 unit of blood transfusion   INR is very high repeat , no evidence of bleeding   Stage D systolic heart failure S/P LVAD HM2   MH2 LVAD  with  TV Annuloplasty  Severe peripheral vascular disease   severe hyperglycemia on insulin coverage    Reglan 10 mg for Gastric Motility   hyperthyroidism on Methimazole 10mg TID   critical care polyneuropathy   Anemia of Acute blood Loss   severe protein caloric malnutrition   Chronic kidney disease stage III  Depressed and withdrawn   resume NG tube feeding   GI prophylaxis with PPI   weaning trial   Discussed with TCV team   critical care time is 35 minutes

## 2021-10-10 NOTE — PROGRESS NOTE ADULT - SUBJECTIVE AND OBJECTIVE BOX
RICKY JOINT  MRN#: [id number]  Subjective:  Pulmonary progress  : recurrent Acute hypoxic respiratory Failure ,aspiration pneumonia, NICM  ,   65M PMH ACC/AHA stage D HF due to NICM HM2 LVAD , TV annuloplasty ring 17 as destination therapy due to severe peripheral artery disease with significant stenosis  SIADH, Depression, CKD-3 with hyperkalemia, past E. coli UTIs, driveline drainage (21) and COVID-19 (back in 2020)  He was recently seen in clinic where he complained of abdominal pain and dark stools w constipation back in May. He presents to [facility] ER today weakness and fatigue, moderate and + Black stools for three days, on coumadin secondary to warfarin use in the setting of an LVAD. Patient has required transfusions for GIB in the past. Mostly recently back in 2021 pt had anemia with dark stools. No interventions was done at that time. However Last Endoscopy was done in 2020 (negative). Today labs show patient is anemic with H/H of 4.5/16.3,. INR is 8.84 MAP in the 90s, Temp 35.1. He denies any chest pain, shortness of breath, dizziness, abd pain, nausea or vomiting. found to have  rectal bleeding underwent endoscopy ,old blood in the proximal ileum ,  develop sepsis with LL opacity given Antibiotics , Extubated , reintubated , Bronchoscopy on Zosyn for LL pneumonia  and Amiodarone S/P TV Annuloplasty , patient remain intubated on full ventilatory support .S/P multiple units of blood transfusion , remain on full ventilatory support on Precedex and propofol , new central IJ line , diarrhea C diff. +ve on po vancomycin and IV Flagyl,  mildly distended belly , fever start on cefepime 2gm q 8 hrs S/P tracheostomy .  new RT Subclavian central line continue on contact  isolation ,S/P  C-diff antibiotics, no more diarrhea back on full support mechanical ventilator , chest x ray show improvement in LLL air space disease, more awake and responsive on tube feeding no more diarrhea ,  no nausea or vomiting or diarrhea still very weak and tired , back on tube feeding ,still on po vancomycin , getting PT and OT at bed side ,   , no SOB getting stronger , improve muscle tone patient transfer to monitor bed still on contact isolation for C-Difficel colitis on 50% FI02, and change to Suresh  tube feeding still loose stool . H/H drop significantly require blood transfusion , most likely GI bleeding , IV heparin D/C ,  H/H is stable ., patient develop TR sided  pneumothorax require chest tube placement , RT IJ central line  placed , develop fever shaking chills , blood culture positive for serratia marcescens , start on cefepime .the patient  become hypoxic and hypotensive placed on full ventilatory support and Vasopressin , levophed and Dobutamine ,S/P blood transfusion on meropenem and vancomycin ,   , on and  off pressors , occasional agitation on Precedex .S/P IR cholecystostomy tube drainage placement in the RT upper Quadrant , resume anticoagulation chest x ray noted C-PAP trail lasted only for 2 hrs , new RT SC line and D/C RT IJ line , RT pig tail cathter has been removed , tolerating C-PAP trial placed on trach. collar 50% FI02 GI consultant noted on NG tube feeding as tolerated , develop AF RVR S/P  bolus Amiodarone  back to regular sinus Rhythm , Flat Affect depressed , back on tube feeding Vital AF at 60 cc/ hr .still intermittent abdominal pain , no fever saturation is accepted  back on full ventilatory support ,   on methimazole for hyperthyroidism ,  condition is the same ,still C/O Abdominal pain , white count is improving , no chest pain or SOB ,  .placed on Reglan 10 mg TID for gastric motility , depressed , withdrawn. , S/P  mesenteric angiogram , unable to stent SMA S/P 3 units of blood transfusion   RT IJ in place reassessed for stent in the SMA by vascular,  dark stool stable H/H ,surgical opinion for possible Lap cholecystectomy. over night events noted develop respiratory distress, , rectal bleeding, require intubation placed on full ventilatory support , FI02 is 50% also became hemianosmically unstable require triple pressor support with levophed , vasopressin and norsynephrine, pan culture placed  on Vancomycin IV and PO  and Zosyn, sedated with propofol kept NPO , new central line RT and left IJ cathter placed . Diflucan Added .fever of 38.5 weaned off  vasopressin , all culture are negative, resume tube feeding ,  white count is improving , on meropenem, vancomycin solution  , on Precedex, no over night events , S/P  tracheostomy , bleeding receiving blood transfusion on vasopressin , no more bleeding , no fever , more awake and responsive ,tolerating tube feeding , ID and heart failure follow up appreciated , depresses no weaning for now , magnesium is low to give supplement too keep Above 2      (2021 16:57)    PAST MEDICAL & SURGICAL HISTORY:  CHF (congestive heart failure)    CAD (coronary artery disease)  Depression    Pleural effusion    History of 2019 novel coronavirus disease (COVID-19)  2020    Hemorrhoids    Bleeding hemorrhoids    Peripheral arterial disease    Claudication    BPH with urinary obstruction    ACC/AHA stage D systolic heart failure  Anticoagulation goal of INR 2.0 to 2.5    Falls    Clavicle fracture    CKD (chronic kidney disease), stage III    Iron deficiency anemia    H/O epistaxis    Vertigo    GI bleed    S/P TVR (tricuspid valve repair)    S/P ventricular assist device    S/P endoscopy    OBJECTIVE:  ICU Vital Signs Last 24 Hrs  T(C): 38.2 (2021 10:00), Max: 38.5 (2021 12:00)  T(F): 100.8 (2021 10:00), Max: 101.3 (2021 12:00)  HR: 65 (2021 10:00) (61 - 69)  BP: --  BP(mean): --  ABP: 105/67 (2021 10:00) (90/54 - 113/64)  ABP(mean): 77 (2021 10:00) (63 - 77)  RR: 20 (2021 10:00) (19 - 35)  SpO2: 99% (2021 10:00) (96% - 100%)       @ 07:  -   @ 07:00  --------------------------------------------------------  IN: 2693.9 mL / OUT: 1415 mL / NET: 1278.9 mL     @ 07:  -   @ 10:49  --------------------------------------------------------  IN: 420.8 mL / OUT: 115 mL / NET: 305.8 mL  PHYSICAL EXAM: Daily   Elderly male intubated on full ventilatory support FI02 is 40% S/P tracheostomy off   vasopressin , Precedex , blood  transfusion   Daily Weight in k.4 (2021 00:00)  HEENT:     + NCAT  + EOMI  - Conjuctival edema   - Icterus   - Thrush   - ETT  + NGT/OGT  Neck:         + FROM  RT IJ , LT IJ  lines JVD  - Nodes - Masses + Mid-line trachea + Tracheostomy  Chest:            normal A-P diameter    Lungs:          + CTA   + Rhonchi    - Rales    - Wheezing + Decreased  LT BS   - Dullness R L  Cardiac:       + S1 + S2    + RRR   - Irregular   - S3  - S4    - Murmurs   - Rub   - Hamman’s sign   Abdomen:    + BS  + Soft + Non-tender - Distended - Organomegaly - PEG .cholecystostomy tube in place  Extremities:   - Cyanosis U/L   - Clubbing  U/L  + LE/UE Edema   + Capillary refill    + Pulses   Neuro:        - Awake   -  Alert   - Confused   - Lethargic   + Sedated  + Generalized Weakness  Skin:        - Rashes    - Erythema   + Normal incisions   + IV sites intact          HOSPITAL MEDICATIONS: All medications reviewed and analyzed  MEDICATIONS  (STANDING):  amiodarone    Tablet 200 milliGRAM(s) Oral daily  chlorhexidine 0.12% Liquid 15 milliLiter(s) Oral Mucosa every 12 hours  chlorhexidine 2% Cloths 1 Application(s) Topical <User Schedule>  dexmedetomidine Infusion 0.5 MICROgram(s)/kG/Hr (9.81 mL/Hr) IV Continuous <Continuous>  dextrose 50% Injectable 50 milliLiter(s) IV Push every 15 minutes  heparin  Infusion 400 Unit(s)/Hr (12.5 mL/Hr) IV Continuous <Continuous>  Hydromorphone  Injectable 0.5 milliGRAM(s) IV Push once  insulin lispro (ADMELOG) corrective regimen sliding scale   SubCutaneous every 6 hours  pantoprazole  Injectable 40 milliGRAM(s) IV Push every 12 hours  piperacillin/tazobactam IVPB.. 3.375 Gram(s) IV Intermittent every 8 hours  propofol Infusion 20 MICROgram(s)/kG/Min (9.42 mL/Hr) IV Continuous <Continuous>  sodium chloride 0.9% lock flush 3 milliLiter(s) IV Push every 8 hours  sodium chloride 0.9%. 1000 milliLiter(s) (10 mL/Hr) IV Continuous <Continuous>    MEDICATIONS  (PRN):  acetaminophen    Suspension .. 650 milliGRAM(s) Oral every 6 hours PRN Temp greater or equal to 38C (100.4F)    LABS: All Lab data reviewed and analyzed                          11.1   15.06 )-----------( 240      ( 10 Oct 2021 01:22 )             34.3    10-10    140  |  101  |  14  ----------------------------<  81  3.4<L>   |  24  |  0.38<L>    Ca    9.8      10 Oct 2021 01:22  Phos  2.2     10-10  Mg     1.4     10-10    TPro  8.1  /  Alb  4.1  /  TBili  1.1  /  DBili  x   /  AST  19  /  ALT  20  /  AlkPhos  157<H>  10-10    Ca    10.6<H>      09 Oct 2021 00:18  Phos  2.6     10-09  Mg     1.7     10-09    TPro  8.6<H>  /  Alb  4.4  /  TBili  1.1  /  DBili  x   /  AST  25  /  ALT  17  /  AlkPhos  166<H>  10-09    Ca    10.4      08 Oct 2021 00:43  Phos  2.2     10-08  Mg     1.5     10-    TPro  8.3  /  Alb  4.4  /  TBili  1.2  /  DBili  x   /  AST  17  /  ALT  15  /  AlkPhos  167<H>  10-08    Ca    10.3      07 Oct 2021 00:57  Phos  2.4     10-07  Mg     1.9     10-07    TPro  8.4<H>  /  Alb  4.2  /  TBili  1.3<H>  /  DBili  x   /  AST  21  /  ALT  17  /  AlkPhos  191<H>  10-07    Ca    9.8      06 Oct 2021 00:26  Phos  2.7     10-06  Mg     1.9     10-    TPro  7.8  /  Alb  4.0  /  TBili  1.6<H>  /  DBili  x   /  AST  18  /  ALT  20  /  AlkPhos  177<H>  10-06                                                                                                                                                                        PTT - ( 2021 04:52 )  PTT:45.2 sec LIVER FUNCTIONS - ( 2021 00:42 )  Alb: 3.4 g/dL / Pro: 6.7 g/dL / ALK PHOS: 213 U/L / ALT: 15 U/L / AST: 24 U/L / GGT: x           RADIOLOGY: - Reviewed and analyzed RT Pig tail cathter  , LVAD HM2, CT scan of abdomen reviewed result noted

## 2021-10-10 NOTE — PROGRESS NOTE ADULT - SUBJECTIVE AND OBJECTIVE BOX
RICKY HAMPTON  MRN-[id number]  Patient is a 66y old  Male who presents with a chief complaint of Anemia, Supratherapeutic INR, Dark Stools (07 Oct 2021 07:48)    HPI:  65M PMH ACC/AHA stage D HF due to NICM HM2 LVAD , TV annuloplasty ring 17 as destination therapy due to severe peripheral artery disease with significant stenosis  SIADH, Depression, CKD-3 with hyperkalemia, past E. coli UTIs, driveline drainage (21) and COVID-19 (back in 2020)  He was recently seen in clinic where he complained of abdominal pain and dark stools w constipation back in May. He presents to [facility] ER today weakness and fatigue, moderate and + Black stools for three days, on coumadin secondary to warfarin use in the setting of an LVAD. Patient has required transfusions for GIB in the past. Mostly recently back in 2021 pt had anemia with dark stools. No interventions was done at that time. However Last Endoscopy was done in 2020 (negative). Today labs show patient is anemic with H/H of 4.5/16.3,. INR is 8.84 MAP in the 90s, Temp 35.1. He denies any chest pain, shortness of breath, dizziness, abd pain, nausea or vomiting.       (2021 16:57)      Surgery/Hospital Course:   admit for melena w/ anemia, INR 8.84   6/15 Capsul study (+) for small bowel bleed, balloon endoscopy (old blood in prox ileum); post EGD - septic w/ L opacity, re-intubated for concern for aspiration, TTE (Mod MR, decrease biV w/ interventricular septum boweing towards R)   bronch    +C Diff    CT C/A/P: Fluid filled colon which may be 2/2 rapid transit. Small bilateral pleffs with associates. Compressive atelectasis New ISABELLE & LLL  parenchymal opacities, suspicious for pneumonia. Moderate stenosis in the proximal superior mesenteric artery.    #8 Shiley trach at bedside    LVAD speed increased to 9200   Bronch   TC since . Patient transferred to SDU.    INR today 2.64.  H&H 7.3/24 this AM.  Will repeat CBC at noon, and will send stool guaiac Patient with persistent abdominal tenderness, rate of tube feeds decreased.  No nausea/vomiting.     INR today 2.4. H&H 9.1/28.6 low flow overnight /N&V, refusing Tube feeds on D5 1/2 normal  @50 cc/hr   INR 2.69  H&H 7.7/.1 refusing Tube feeds on D5 1/2 normal  @50 cc/hr. This am + BM Melena Dr Oneill HF  aware- PRBC x1  GI team consulted -  NPO  plan on study in am-  D/w Dr Cadet Patient  to return to CTU for further management; 1PRBCS    Post op INR 2.2 today.  No bleeding. BC + for SM.  Pt is hypotensive requiring pressor and inotropic support.  ID follow up today on Cefepime will follow.   R PTC for PTX    CT C/A/P: sub q emphysema in R chest wall, GGO RUL, small ascites CTH negative; Abd US: GB thickening, pericholecystic fluid     Perchole drain in place continues to drain total output overnight 133.  Fever today 38.8    duplex LE negative    Patient with persistent abdominal pain, refusing tube feeds and medications, Psych consulted   CTA A/P ordered to r/o mesenteric ischemia 2/2 persistent anorexia, nausea, vomiting. Revealed:  Evaluation of the mesenteric vessels is limited by streak artifact from LVAD. There appears to be severe stenosis of the proximal SMA; abdominal mesenteric doppler is recommended for further evaluation. 2.  No small bowel findings to suggest acute mesenteric ischemia. 3.  Focal dissections involving the right and left common iliac arteries.  8/15: Cultures resulted BC 1/2 +GPC in clusters, SC enterobacter; mesenteric duplex: borderline stenosis of proximal SMA  : CT C/A/P noncon: Nondular opacities in R lung apex w/cavitation, abd nl  :  Continue current care, treatment of thyrotoxicosis with medications as per endocrine, d/c ABX as per team.    RUQ sono: Contracted gallbladder with cholecystostomy tube in place.  9/10 failed SMA stent, L fem PSA, s/p 3U PRCB   CTA Abd/Pelvis L RP hematoma    Trach decannulated    intubated fro resp distress for increased WOB, LL pneumonia; CT C/A/P: progressive ISABELLE opacities and L consolidations, multifocal pneumonia    TTE (EF 25%, decreased RV, mod MR)   10/3 VT -> Lidocaine gtt   10/4 Trach size 6 DCT cuff  10/5 CT abd (neg for intra-abd abcess, severe stenosis of prox SMA and b/l iliac arteries)    Today:  Plan for CPAP trials  Tube feeds remain on hold, recieving IV hydration w/ D5  NS @ 30ml/hr  Plan for palliative care discussion follow up on 10/11, recommendations per ethics are to have with creole  as its patient's primary language and     REVIEW OF SYSTEMS:  Unable to obtain due to patients trach     ICU Vital Signs Last 24 Hrs  T(C): 36.8 (10 Oct 2021 08:00), Max: 36.9 (09 Oct 2021 12:00)  T(F): 98.2 (10 Oct 2021 08:00), Max: 98.4 (09 Oct 2021 12:00)  HR: 109 (10 Oct 2021 09:00) (97 - 124)  BP: --  BP(mean): --  ABP: 106/96 (10 Oct 2021 09:00) (99/80 - 137/87)  ABP(mean): 101 (10 Oct 2021 09:00) (80 - 109)  RR: 20 (10 Oct 2021 09:00) (12 - 28)  SpO2: 99% (10 Oct 2021 09:00) (93% - 100%)      Physical Exam:  Gen:  NAD  CNS: moves all extremities to command   Neck: no JVD, +trach   RES : coarse breath sounds, no wheezing    CVS: +LVAD hum   Abd: Soft. Dawn drain with bilious fluid. Positive BS throughout. Mild RUQ abdominal tenderness by perc dawn.  : Landrum intact draining urine without hematuria.  Skin: No rash, erythema, cyanosis.  Vasc: Warm and well-perfused.  Ext:  no edema    ============================I/O===========================  I&O's Detail    09 Oct 2021 07:01  -  10 Oct 2021 07:00  --------------------------------------------------------  IN:    dextrose 5% + sodium chloride 0.45%: 720 mL    Enteral Tube Flush: 125 mL    IV PiggyBack: 250 mL    sodium chloride 0.9%: 240 mL  Total IN: 1335 mL    OUT:    Drain (mL): 160 mL    Indwelling Catheter - Urethral (mL): 710 mL    Miscellaneous Tube Feedin mL  Total OUT: 870 mL    Total NET: 465 mL      10 Oct 2021 07:01  -  10 Oct 2021 09:51  --------------------------------------------------------  IN:    dextrose 5% + sodium chloride 0.45%: 60 mL    sodium chloride 0.9%: 20 mL  Total IN: 80 mL    OUT:    Indwelling Catheter - Urethral (mL): 30 mL  Total OUT: 30 mL    Total NET: 50 mL           ============================ LABS =========================                                                11.1   15.06 )-----------( 240      ( 10 Oct 2021 01:22 )             34.3     10-10    140  |  101  |  14  ----------------------------<  81  3.4<L>   |  24  |  0.38<L>    Ca    9.8      10 Oct 2021 01:22  Phos  2.2     10-10  Mg     1.4     10-10    TPro  8.1  /  Alb  4.1  /  TBili  1.1  /  DBili  x   /  AST  19  /  ALT  20  /  AlkPhos  157<H>  1010      ABG - ( 10 Oct 2021 00:40 )  pH, Arterial: 7.39  pH, Blood: x     /  pCO2: 46    /  pO2: 181   / HCO3: 28    / Base Excess: 2.2   /  SaO2: 99.8            ======================Micro/Rad/Cardio=================  Culture: Reviewed   CXR: Reviewed  Echo:Reviewed  ======================================================  PAST MEDICAL & SURGICAL HISTORY:  CHF (congestive heart failure)    CAD (coronary artery disease)    Depression    Pleural effusion    History of  novel coronavirus disease (COVID-19)  2020    Hemorrhoids    Bleeding hemorrhoids    Peripheral arterial disease    Claudication    BPH with urinary obstruction    ACC/AHA stage D systolic heart failure    Anticoagulation goal of INR 2.0 to 2.5    Falls    Clavicle fracture    CKD (chronic kidney disease), stage III    Iron deficiency anemia    H/O epistaxis    Vertigo    GI bleed    S/P TVR (tricuspid valve repair)    S/P ventricular assist device    S/P endoscopy      ====================ASSESSMENT ==============  Stage D Nonischemic Cardiomyopathy, Status Post HM2 on 2017    Cardiogenic shock  Hemodynamic instability   Acute hypoxemic respiratory failure s/p trach , decannulated on ; Reintubated on    GI bleed , Status Post Enteroscopy   Anemia, in setting of melena   Chronic Kidney Disease  Stress hyperglycemia   C.diff positive on    Hypovolemic shock  Septic shock  Leukocytosis  GB thickening/percholecystic s/p perc choley by IT   SMA stenosis  Serratia/citrobacter pneumonia   Stenotrophomonas pneumonia   Enterobacter pneumonia   Nasuea/vomiting  Deconditioning    Plan:  ====================== NEUROLOGY=====================  Non-focal, continue to monitor neuro status  Tylenol PRN for analgesia     acetaminophen    Suspension .. 650 milliGRAM(s) Enteral Tube every 6 hours PRN Mild Pain (1 - 3)    ==================== RESPIRATORY======================  Acute hypoxemic respiratory failure s/p #8 shiley trach on ; decannulated on ; Reintubated on ; trach size 6 DCT cuff on 10/4   Continue close monitoring of respiratory rate and breathing pattern, following of ABG’s with A-line monitoring, continuous pulse oximetry monitoring.   CPAP trials as tolerated    Mechanical Ventilation:  Mode: AC/ CMV (Assist Control/ Continuous Mandatory Ventilation)  RR (machine): 12  TV (machine): 500  FiO2: 40  PEEP: 5  ITime: 1  MAP: 10  PIP: 22      ====================CARDIOVASCULAR==================  Stage D Nonischemic Cardiomyopathy, Status Post HM2 on 2017; LVAD settings 9200, flow 5.8  TTE 10/4:  Severely decreased LV systolic function, Diffuse hypokinesis. Mild AR. No pericardial effusion   VT on 10/4, s/p Lidocaine drip, continue close tele monitoring   Holding off on Propranolol for now given recent pressor requirement      ===================HEMATOLOGIC/ONC ===================  CTA A/P on  +L RP hematoma   Acute blood loss anemia, H/H 10.9/33.7, S/P 2U of pRBCs yesterday and monitor CBC   Thrombocytopenia, possible in setting of sepsis, now starting to trend up, 130->179, Rest of labs not consistent with DIC  Holding coumadin and ASA given recurrent GI bleeding, continue monitoring LDH   Monitor H&H/Plts     ===================== RENAL =========================  Hx of CKD, continue monitoring urine output, I&OS, BUN/Cr   Euvolemic, even fluid balance, currently off diuretics.    Replete lytes PRN. Keep K> 4 and Mg >2  C/w Aldactone for euvolemic fluid balance goal    spironolactone 12.5 milliGRAM(s) Oral every 12 hours    ==================== GASTROINTESTINAL===================  Held TFs this AM for line changes   CT A/P 10/5 neg for intra-abd abcess, severe stenosis of prox SMA and b/l iliac arteries  CTA A/P : Evaluation of the mesenteric vessels is limited by streak artifact from LVAD. There appears to be severe stenosis of the proximal SMA; abdominal mesenteric doppler is recommended for further evaluation. 2.  No small bowel findings to suggest acute mesenteric ischemia. 3.  Focal dissections involving the right and left common iliac arteries.  Mesenteric duplex on 8/15 borderline stenosis of proximal SMA, s/p failed SMA stent, L fem RP hematoma on 9/10; No intervention for cholecystostomy tube/PEJ at this time   Vascular following for possible SMA stent, currently patient refusing  Reglan for gut motility  PRN Simethicone for gas  TF on hold, given recent episodes of vomitting  Hydration w/ d5 1/2ns    albumin human  5% IVPB 250 milliLiter(s) IV Intermittent once  multivitamin 1 Tablet(s) Oral daily  GI prophylaxis, pantoprazole  Injectable 40 milliGRAM(s) IV Push every 12 hours  simethicone 80 milliGRAM(s) Chew every 8 hours PRN Gas  sodium chloride 0.9%. 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       Per endocrine      - c/w Methimazole, adjust based on labs        - Check Free T4 and total T3     insulin lispro (ADMELOG) corrective regimen sliding scale   SubCutaneous every 6 hours  methimazole 20 milliGRAM(s) Oral <User Schedule>    ========================INFECTIOUS DISEASE================  Afebrile, WBC rising 11.59->15.48   Continue trending WBC and monitoring fever curve   CT C/A/P : progressive ISABELLE opacities and L consolidations, multifocal pneumonia  BCx  + Serratia marcescens, BCx 1/2 on 10/1 +Enterobacter cloacae complex, BCx 10/3 +GNR, repeat BCx x2 10/5 NGTD   Coverage with Meropenem that completes 10/16  Vancomycin solution for C.diff prophylaxis  Will f/u antibiotic regimen with ID    meropenem  IVPB 1000 milliGRAM(s) IV Intermittent every 8 hours  vancomycin    Solution 125 milliGRAM(s) Oral every 12 hours      Patient requires continuous monitoring with bedside rhythm monitoring, pulse ox monitoring, and intermittent blood gas analysis. Care plan discussed with ICU care team. Patient remained critical and at risk for life threatening decompensation.

## 2021-10-10 NOTE — PROGRESS NOTE ADULT - ASSESSMENT
Assessment and Recommendation:   · Assessment	  Assessment and recommendation :  Recurrent Acute hypoxic respiratory Failure reintubated on full ventilator support FI02 is 40% S/P tracheostomy    Acute blood loss anemia S/P multiple  blood transfusion post tracheostomy   recurrent  septic shock  weaned off  vasopressin   hemorrhagic shock receiving 2 unit of blood transfusion   pan culture negative on meropenem  , po vancomycin   S/P cholecystostomy tube placement by IR    AF RVR back to regular sinus Rhythm   Non ischemic cardiomyopathy continue ACE inhibitor and B-Blockers   mesenteric ischemia failure to stent SMA , no plan for repeated trial   S/P 3 unit of blood transfusion   INR is very high repeat , no evidence of bleeding   Stage D systolic heart failure S/P LVAD HM2   MH2 LVAD  with  TV Annuloplasty  Severe peripheral vascular disease   severe hyperglycemia on insulin coverage    Reglan 10 mg for Gastric Motility   hyperthyroidism on Methimazole 10mg TID   critical care polyneuropathy   Anemia of Acute blood Loss   severe protein caloric malnutrition   magnesium supplement keep above 2   Chronic kidney disease stage III  Depressed and withdrawn   resume NG tube feeding   GI prophylaxis with PPI   weaning trial   Discussed with TCV team   critical care time is 35 minutes

## 2021-10-10 NOTE — PROGRESS NOTE ADULT - SUBJECTIVE AND OBJECTIVE BOX
RICKY HAMPTON  MRN-[id number]  Patient is a 66y old  Male who presents with a chief complaint of Anemia, Supratherapeutic INR, Dark Stools (10 Oct 2021 14:31)    HPI:  65M PMH ACC/AHA stage D HF due to NICM HM2 LVAD , TV annuloplasty ring 17 as destination therapy due to severe peripheral artery disease with significant stenosis  SIADH, Depression, CKD-3 with hyperkalemia, past E. coli UTIs, driveline drainage (21) and COVID-19 (back in 2020)  He was recently seen in clinic where he complained of abdominal pain and dark stools w constipation back in May. He presents to [facility] ER today weakness and fatigue, moderate and + Black stools for three days, on coumadin secondary to warfarin use in the setting of an LVAD. Patient has required transfusions for GIB in the past. Mostly recently back in 2021 pt had anemia with dark stools. No interventions was done at that time. However Last Endoscopy was done in 2020 (negative). Today labs show patient is anemic with H/H of 4.5/16.3,. INR is 8.84 MAP in the 90s, Temp 35.1. He denies any chest pain, shortness of breath, dizziness, abd pain, nausea or vomiting.       (2021 16:57)      Surgery/Hospital course:    Today:      ============================I/O===========================   I&O's Detail    09 Oct 2021 07:01  -  10 Oct 2021 07:00  --------------------------------------------------------  IN:    dextrose 5% + sodium chloride 0.45%: 720 mL    Enteral Tube Flush: 125 mL    IV PiggyBack: 250 mL    sodium chloride 0.9%: 240 mL  Total IN: 1335 mL    OUT:    Drain (mL): 160 mL    Indwelling Catheter - Urethral (mL): 710 mL    Miscellaneous Tube Feedin mL  Total OUT: 870 mL    Total NET: 465 mL      10 Oct 2021 07:01  -  10 Oct 2021 19:02  --------------------------------------------------------  IN:    dextrose 5% + sodium chloride 0.45%: 360 mL    IV PiggyBack: 50 mL    sodium chloride 0.9%: 120 mL  Total IN: 530 mL    OUT:    Drain (mL): 50 mL    Indwelling Catheter - Urethral (mL): 30 mL    Voided (mL): 200 mL  Total OUT: 280 mL    Total NET: 250 mL        ============================ LABS =========================                        11.1   15.06 )-----------( 240      ( 10 Oct 2021 01:22 )             34.3     10-10    140  |  101  |  14  ----------------------------<  81  3.4<L>   |  24  |  0.38<L>    Ca    9.8      10 Oct 2021 01:22  Phos  2.2     10-10  Mg     1.4     10-10    TPro  8.1  /  Alb  4.1  /  TBili  1.1  /  DBili  x   /  AST  19  /  ALT  20  /  AlkPhos  157<H>  10-10    LIVER FUNCTIONS - ( 10 Oct 2021 01:22 )  Alb: 4.1 g/dL / Pro: 8.1 g/dL / ALK PHOS: 157 U/L / ALT: 20 U/L / AST: 19 U/L / GGT: x             ABG - ( 10 Oct 2021 00:40 )  pH, Arterial: 7.39  pH, Blood: x     /  pCO2: 46    /  pO2: 181   / HCO3: 28    / Base Excess: 2.2   /  SaO2: 99.8                ======================Micro/Rad/Cardio=================  Culture: Reviewed   CXR: Reviewed  Echo:Reviewed  ======================================================  PAST MEDICAL & SURGICAL HISTORY:  CHF (congestive heart failure)    CAD (coronary artery disease)    Depression    Pleural effusion    History of  novel coronavirus disease (COVID-19)  2020    Hemorrhoids    Bleeding hemorrhoids    Peripheral arterial disease    Claudication    BPH with urinary obstruction    ACC/AHA stage D systolic heart failure    Anticoagulation goal of INR 2.0 to 2.5    Falls    Clavicle fracture    CKD (chronic kidney disease), stage III    Iron deficiency anemia    H/O epistaxis    Vertigo    GI bleed    S/P TVR (tricuspid valve repair)    S/P ventricular assist device    S/P endoscopy      ====================ASSESMENT ==============  CNS:  RES:  CVS:  Hem:  Sabino:  GI:  Endo:  ID:  Skin  Plan:  ====================== NEUROLOGY=====================  acetaminophen    Suspension .. 650 milliGRAM(s) Enteral Tube every 6 hours PRN Mild Pain (1 - 3)  fentaNYL    Injectable 12.5 MICROGram(s) IV Push every 3 hours PRN Moderate to severe pain  metoclopramide Injectable 10 milliGRAM(s) IV Push every 8 hours  ondansetron Injectable 4 milliGRAM(s) IV Push once    ==================== RESPIRATORY======================  Mechanical Ventilation:  Mode: AC/ CMV (Assist Control/ Continuous Mandatory Ventilation)  RR (machine): 12  TV (machine): 500  FiO2: 40  PEEP: 5  ITime: 1  MAP: 8  PIP: 20      ====================CARDIOVASCULAR==================  spironolactone 12.5 milliGRAM(s) Oral every 12 hours    ===================HEMATOLOGIC/ONC ===================    ===================== RENAL =========================  Landrum for monitoring urine output    ==================== GASTROINTESTINAL===================  dextrose 5% + sodium chloride 0.45%. 1000 milliLiter(s) (30 mL/Hr) IV Continuous <Continuous>  multivitamin 1 Tablet(s) Oral daily  pantoprazole  Injectable 40 milliGRAM(s) IV Push every 12 hours  simethicone 80 milliGRAM(s) Chew every 8 hours PRN Gas  sodium chloride 0.9%. 1000 milliLiter(s) (10 mL/Hr) IV Continuous <Continuous>  thiamine 100 milliGRAM(s) Oral daily    =======================    ENDOCRINE  =====================  insulin lispro (ADMELOG) corrective regimen sliding scale   SubCutaneous every 6 hours  methimazole 20 milliGRAM(s) Oral every 8 hours  predniSONE   Tablet 40 milliGRAM(s) Oral daily    ========================INFECTIOUS DISEASE================  meropenem  IVPB 1000 milliGRAM(s) IV Intermittent every 8 hours  vancomycin    Solution 125 milliGRAM(s) Oral every 12 hours      Patient requires continuous monitoring with bedside rhythm monitoring, pulse oximetry monitoring; and intermittent blood gas analysis.  Care plan discussed with ICU care team.    Patient remained critical and required more than usual post op care.     I have spent 35 minutes providing non-routine post op care with multiple re-evalutions throughout the evening.            RICKY HAMPTON  MRN-[id number]  Patient is a 66y old  Male who presents with a chief complaint of Anemia, Supratherapeutic INR, Dark Stools (10 Oct 2021 14:31)    HPI:  65M PMH ACC/AHA stage D HF due to NICM HM2 LVAD , TV annuloplasty ring 17 as destination therapy due to severe peripheral artery disease with significant stenosis  SIADH, Depression, CKD-3 with hyperkalemia, past E. coli UTIs, driveline drainage (21) and COVID-19 (back in 2020)  He was recently seen in clinic where he complained of abdominal pain and dark stools w constipation back in May. He presents to [facility] ER today weakness and fatigue, moderate and + Black stools for three days, on coumadin secondary to warfarin use in the setting of an LVAD. Patient has required transfusions for GIB in the past. Mostly recently back in 2021 pt had anemia with dark stools. No interventions was done at that time. However Last Endoscopy was done in 2020 (negative). Today labs show patient is anemic with H/H of 4.5/16.3,. INR is 8.84 MAP in the 90s, Temp 35.1. He denies any chest pain, shortness of breath, dizziness, abd pain, nausea or vomiting.       (2021 16:57)      Surgery/Hospital course:   admit for melena w/ anemia, INR 8.84   6/15 Capsul study (+) for small bowel bleed, balloon endoscopy (old blood in prox ileum); post EGD - septic w/ L opacity, re-intubated for concern for aspiration, TTE (Mod MR, decrease biV w/ interventricular septum boweing towards R)   bronch    +C Diff    CT C/A/P: Fluid filled colon which may be 2/2 rapid transit. Small bilateral pleffs with associates. Compressive atelectasis New ISABELLE & LLL  parenchymal opacities, suspicious for pneumonia. Moderate stenosis in the proximal superior mesenteric artery.    #8 Shiley trach at bedside    LVAD speed increased to 9200   Bronch   TC since . Patient transferred to SDU.    INR today 2.64.  H&H 7.3/24 this AM.  Will repeat CBC at noon, and will send stool guaiac Patient with persistent abdominal tenderness, rate of tube feeds decreased.  No nausea/vomiting.     INR today 2.4. H&H 9.1/28.6 low flow overnight /N&V, refusing Tube feeds on D5 1/2 normal  @50 cc/hr   INR 2.69  H&H 7.7/.1 refusing Tube feeds on D5 1/2 normal  @50 cc/hr. This am + BM Melena Dr Oneill HF  aware- PRBC x1  GI team consulted -  NPO  plan on study in am-  D/w Dr Cadet Patient  to return to CTU for further management; 1PRBCS    Post op INR 2.2 today.  No bleeding. BC + for SM.  Pt is hypotensive requiring pressor and inotropic support.  ID follow up today on Cefepime will follow.   R PTC for PTX    CT C/A/P: sub q emphysema in R chest wall, GGO RUL, small ascites CTH negative; Abd US: GB thickening, pericholecystic fluid     Perchole drain in place continues to drain total output overnight 133.  Fever today 38.8    duplex LE negative    Patient with persistent abdominal pain, refusing tube feeds and medications, Psych consulted   CTA A/P ordered to r/o mesenteric ischemia 2/2 persistent anorexia, nausea, vomiting. Revealed:  Evaluation of the mesenteric vessels is limited by streak artifact from LVAD. There appears to be severe stenosis of the proximal SMA; abdominal mesenteric doppler is recommended for further evaluation. 2.  No small bowel findings to suggest acute mesenteric ischemia. 3.  Focal dissections involving the right and left common iliac arteries.  8/15: Cultures resulted BC 1/2 +GPC in clusters, SC enterobacter; mesenteric duplex: borderline stenosis of proximal SMA  : CT C/A/P noncon: Nondular opacities in R lung apex w/cavitation, abd nl  :  Continue current care, treatment of thyrotoxicosis with medications as per endocrine, d/c ABX as per team.    RUQ sono: Contracted gallbladder with cholecystostomy tube in place.  9/10 failed SMA stent, L fem PSA, s/p 3U PRCB   CTA Abd/Pelvis L RP hematoma    Trach decannulated    intubated fro resp distress for increased WOB, LL pneumonia; CT C/A/P: progressive ISABELLE opacities and L consolidations, multifocal pneumonia    TTE (EF 25%, decreased RV, mod MR)   10/3 VT -> Lidocaine gtt   10/4 Trach size 6 DCT cuff  10/5 CT abd (neg for intra-abd abcess, severe stenosis of prox SMA and b/l iliac arteries)    Today:  Plan for CPAP trials  Tube feeds remain on hold, recieving IV hydration w/ D5  NS @ 30ml/hr    ============================I/O===========================   I&O's Detail    09 Oct 2021 07:  -  10 Oct 2021 07:00  --------------------------------------------------------  IN:    dextrose 5% + sodium chloride 0.45%: 720 mL    Enteral Tube Flush: 125 mL    IV PiggyBack: 250 mL    sodium chloride 0.9%: 240 mL  Total IN: 1335 mL    OUT:    Drain (mL): 160 mL    Indwelling Catheter - Urethral (mL): 710 mL    Miscellaneous Tube Feedin mL  Total OUT: 870 mL    Total NET: 465 mL      10 Oct 2021 07:01  -  10 Oct 2021 19:02  --------------------------------------------------------  IN:    dextrose 5% + sodium chloride 0.45%: 360 mL    IV PiggyBack: 50 mL    sodium chloride 0.9%: 120 mL  Total IN: 530 mL    OUT:    Drain (mL): 50 mL    Indwelling Catheter - Urethral (mL): 30 mL    Voided (mL): 200 mL  Total OUT: 280 mL    Total NET: 250 mL        ============================ LABS =========================                        11.1   15.06 )-----------( 240      ( 10 Oct 2021 01:22 )             34.3     10-10    140  |  101  |  14  ----------------------------<  81  3.4<L>   |  24  |  0.38<L>    Ca    9.8      10 Oct 2021 01:22  Phos  2.2     10-10  Mg     1.4     10-10    TPro  8.1  /  Alb  4.1  /  TBili  1.1  /  DBili  x   /  AST  19  /  ALT  20  /  AlkPhos  157<H>  10-10    LIVER FUNCTIONS - ( 10 Oct 2021 01:22 )  Alb: 4.1 g/dL / Pro: 8.1 g/dL / ALK PHOS: 157 U/L / ALT: 20 U/L / AST: 19 U/L / GGT: x             ABG - ( 10 Oct 2021 00:40 )  pH, Arterial: 7.39  pH, Blood: x     /  pCO2: 46    /  pO2: 181   / HCO3: 28    / Base Excess: 2.2   /  SaO2: 99.8      Gen:  NAD  CNS: moves all extremities to command   Neck: no JVD, +trach   RES : coarse breath sounds, no wheezing    CVS: +LVAD hum   Abd: Soft. Dawn drain with bilious fluid. Positive BS throughout. Mild RUQ abdominal tenderness by perc dawn.  : Landrum intact draining urine without hematuria.  Skin: No rash, erythema, cyanosis.  Vasc: Warm and well-perfused.  Ext:  no edema          ======================Micro/Rad/Cardio=================  Culture: Reviewed   CXR: Reviewed  Echo:Reviewed  ======================================================  PAST MEDICAL & SURGICAL HISTORY:  CHF (congestive heart failure)    CAD (coronary artery disease)    Depression    Pleural effusion    History of 2019 novel coronavirus disease (COVID-19)  2020    Hemorrhoids    Bleeding hemorrhoids    Peripheral arterial disease    Claudication    BPH with urinary obstruction    ACC/AHA stage D systolic heart failure    Anticoagulation goal of INR 2.0 to 2.5    Falls    Clavicle fracture    CKD (chronic kidney disease), stage III    Iron deficiency anemia    H/O epistaxis    Vertigo    GI bleed    S/P TVR (tricuspid valve repair)    S/P ventricular assist device    S/P endoscopy      ====================ASSESMENT ==============   Stage D Nonischemic Cardiomyopathy, Status Post HM2 on 2017    Cardiogenic shock  Hemodynamic instability   Acute hypoxemic respiratory failure s/p trach , decannulated on ; Reintubated on    GI bleed , Status Post Enteroscopy   Anemia, in setting of melena   Chronic Kidney Disease  Stress hyperglycemia   C.diff positive on    Hypovolemic shock  Septic shock  Leukocytosis  GB thickening/percholecystic s/p perc choley by IT   SMA stenosis  Serratia/citrobacter pneumonia   Stenotrophomonas pneumonia   Enterobacter pneumonia   Nasuea/vomiting  Deconditioning      ====================== NEUROLOGY=====================  acetaminophen    Suspension .. 650 milliGRAM(s) Enteral Tube every 6 hours PRN Mild Pain (1 - 3)  fentaNYL    Injectable 12.5 MICROGram(s) IV Push every 3 hours PRN Moderate to severe pain  metoclopramide Injectable 10 milliGRAM(s) IV Push every 8 hours  ondansetron Injectable 4 milliGRAM(s) IV Push once      Non-focal, continue to monitor neuro status  ==================== RESPIRATORY======================  Mechanical Ventilation:  Mode: AC/ CMV (Assist Control/ Continuous Mandatory Ventilation)  RR (machine): 12  TV (machine): 500  FiO2: 40  PEEP: 5  ITime: 1  MAP: 8  PIP: 20    Acute hypoxemic respiratory failure s/p #8 shiley trach on ; decannulated on ; Reintubated on ; trach size 6 DCT cuff on 10/4   Continue close monitoring of respiratory rate and breathing pattern, following of ABG’s with A-line monitoring, continuous pulse oximetry monitoring.   CPAP trials as tolerated  ====================CARDIOVASCULAR==================  spironolactone 12.5 milliGRAM(s) Oral every 12 hours    Stage D Nonischemic Cardiomyopathy, Status Post HM2 on 2017; LVAD settings 9200, flow 5.8  TTE 10/4:  Severely decreased LV systolic function, Diffuse hypokinesis. Mild AR. No pericardial effusion   VT on 10/4, s/p Lidocaine drip, continue close tele monitoring   Holding off on Propranolol for now given recent pressor requirement    ===================HEMATOLOGIC/ONC ===================    ===================== RENAL =========================  Landrum for monitoring urine output    ==================== GASTROINTESTINAL===================  dextrose 5% + sodium chloride 0.45%. 1000 milliLiter(s) (30 mL/Hr) IV Continuous <Continuous>  multivitamin 1 Tablet(s) Oral daily  pantoprazole  Injectable 40 milliGRAM(s) IV Push every 12 hours  simethicone 80 milliGRAM(s) Chew every 8 hours PRN Gas  sodium chloride 0.9%. 1000 milliLiter(s) (10 mL/Hr) IV Continuous <Continuous>  thiamine 100 milliGRAM(s) Oral daily    =======================    ENDOCRINE  =====================  insulin lispro (ADMELOG) corrective regimen sliding scale   SubCutaneous every 6 hours  methimazole 20 milliGRAM(s) Oral every 8 hours  predniSONE   Tablet 40 milliGRAM(s) Oral daily    ========================INFECTIOUS DISEASE================  meropenem  IVPB 1000 milliGRAM(s) IV Intermittent every 8 hours  vancomycin    Solution 125 milliGRAM(s) Oral every 12 hours      Patient requires continuous monitoring with bedside rhythm monitoring, pulse oximetry monitoring; and intermittent blood gas analysis.  Care plan discussed with ICU care team.    Patient remained critical and required more than usual post op care.     I have spent 35 minutes providing non-routine post op care with multiple re-evalutions throughout the evening.

## 2021-10-10 NOTE — PROGRESS NOTE ADULT - ASSESSMENT
50 y/o POD #6 for tracheostomy 2/2 respiratory failure. Pt is doing well, #6 DCT cuffed in place, on the vent, with minimal serosanguinous oozing from stoma, no active bleed. sutures x4 and umbilical tie in place.

## 2021-10-10 NOTE — CHART NOTE - NSCHARTNOTEFT_GEN_A_CORE
Repeat TFTs reviewed. Free T4 now 4.0 and total T3 93. Dose of Methimazole was increased to 20 mg TID on 10/7/2021. Suggest c/w this dose for now and can repeat TFTs later this week.    Mila Grajeda MD   On evenings and weekends, please call the office at [phone] or page endocrine fellow on call. Please note that this patient may be followed by different provider tomorrow. If no answer, contact the office.

## 2021-10-10 NOTE — PROGRESS NOTE ADULT - PROBLEM SELECTOR PLAN 1
- Continue vent per RT/CTU  - Suction prn   - Continue with trach site care, keep dry and clean   - Sutures will be removed 10/11  - will continue to follow.

## 2021-10-10 NOTE — PROGRESS NOTE ADULT - SUBJECTIVE AND OBJECTIVE BOX
ENT ISSUE/POD: POD #6 for trach     HPI: 67 y/o male POD #6 for tracheostomy #6 DCT cuffed in place 2/2 respiratory failure. Pt is doing well on the vent. no reported issues.       PAST MEDICAL & SURGICAL HISTORY:  CHF (congestive heart failure)    CAD (coronary artery disease)    Depression    Pleural effusion    History of 2019 novel coronavirus disease (COVID-19)  april 2020    Hemorrhoids    Bleeding hemorrhoids    Peripheral arterial disease    Claudication    BPH with urinary obstruction    ACC/AHA stage D systolic heart failure    Anticoagulation goal of INR 2.0 to 2.5    Falls    Clavicle fracture    CKD (chronic kidney disease), stage III    Iron deficiency anemia    H/O epistaxis    Vertigo    GI bleed    S/P TVR (tricuspid valve repair)    S/P ventricular assist device    S/P endoscopy      Allergies    Amiodarone Hydrochloride (Other (Severe))    Intolerances      MEDICATIONS  (STANDING):  chlorhexidine 0.12% Liquid 15 milliLiter(s) Oral Mucosa two times a day  chlorhexidine 2% Cloths 1 Application(s) Topical <User Schedule>  dextrose 5% + sodium chloride 0.45%. 1000 milliLiter(s) (30 mL/Hr) IV Continuous <Continuous>  insulin lispro (ADMELOG) corrective regimen sliding scale   SubCutaneous every 6 hours  meropenem  IVPB 1000 milliGRAM(s) IV Intermittent every 8 hours  methimazole 20 milliGRAM(s) Oral every 8 hours  metoclopramide Injectable 10 milliGRAM(s) IV Push every 8 hours  multivitamin 1 Tablet(s) Oral daily  pantoprazole  Injectable 40 milliGRAM(s) IV Push every 12 hours  predniSONE   Tablet 40 milliGRAM(s) Oral daily  sodium chloride 0.9%. 1000 milliLiter(s) (10 mL/Hr) IV Continuous <Continuous>  spironolactone 12.5 milliGRAM(s) Oral every 12 hours  thiamine 100 milliGRAM(s) Oral daily  vancomycin    Solution 125 milliGRAM(s) Oral every 12 hours    MEDICATIONS  (PRN):  acetaminophen    Suspension .. 650 milliGRAM(s) Enteral Tube every 6 hours PRN Mild Pain (1 - 3)  fentaNYL    Injectable 12.5 MICROGram(s) IV Push every 3 hours PRN Moderate to severe pain  simethicone 80 milliGRAM(s) Chew every 8 hours PRN Gas    social history: see consult     family history: see consult   ROS:   ENT: all negative except as noted in HPI   Pulm: denies SOB, cough, hemoptysis  Neuro: denies numbness/tingling, loss of sensation  Endo: denies heat/cold intolerance, excessive sweating      Vital Signs Last 24 Hrs  T(C): 36.8 (10 Oct 2021 08:00), Max: 36.9 (09 Oct 2021 12:00)  T(F): 98.2 (10 Oct 2021 08:00), Max: 98.4 (09 Oct 2021 12:00)  HR: 113 (10 Oct 2021 10:00) (97 - 124)  BP: --  BP(mean): --  RR: 19 (10 Oct 2021 10:00) (12 - 28)  SpO2: 100% (10 Oct 2021 10:00) (93% - 100%)                          11.1   15.06 )-----------( 240      ( 10 Oct 2021 01:22 )             34.3    10-10    140  |  101  |  14  ----------------------------<  81  3.4<L>   |  24  |  0.38<L>    Ca    9.8      10 Oct 2021 01:22  Phos  2.2     10-10  Mg     1.4     10-10    TPro  8.1  /  Alb  4.1  /  TBili  1.1  /  DBili  x   /  AST  19  /  ALT  20  /  AlkPhos  157<H>  10-10       PHYSICAL EXAM:  Gen: NAD, well-developed  Head: Normocephalic, Atraumatic  Face: no edema/erythema/fluctuance  Eyes:  no scleral injection  Nose: Nares bilaterally patent, no discharge  Mouth: No Stridor / Drooling / Trismus.  Mucosa moist, tongue/uvula midline, oropharynx clear  Neck: #6 DCT inflated cuff with minimal serosanguinous oozing from stoma, no active bleeding, sutures x4 and umbilical tie in place.  No lymphadenopathy, trachea midline, no masses  Resp: ventilating well  CV: no peripheral edema/cyanosis

## 2021-10-11 NOTE — PROGRESS NOTE ADULT - ATTENDING COMMENTS
Palliative involved for goals of care. patient unwilling to engage in conversation. attempts to coordinate meeting with vascular unsuccessful- per their note today, they are signing off. will need assistance coordinating meeting with multiple disciplines, and may also need family given patients reluctance to participate in conversation today. Our availability for tomorrow is between 10-3:30pm currently. if a meeting is set up, please ensure the provider who would be attending is notified directly. Thank you.    [phone]

## 2021-10-11 NOTE — PROGRESS NOTE ADULT - PROBLEM SELECTOR PLAN 3
- Vascular surgery following, may reattempt via subclavian approach  - Patient aware of risk and benefits. Will have further discussion today about possibly going to OR. If patient refuses will need to discuss further GOC

## 2021-10-11 NOTE — PROGRESS NOTE ADULT - ASSESSMENT
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 coli UTIs, driveline drainage (1/7/21) and COVID-19 (back in April 2020)  He was recently seen in clinic where he complained of abdominal pain and dark stools w constipation back in May. He presents to [facility] ER today weakness and fatigue, moderate and + Black stools for three days, on coumadin secondary to warfarin use in the setting of an LVAD. Patient has required transfusions for GIB in the past. Mostly recently back in jan 2021 pt had anemia with dark stools. No interventions was done at that time. However Last Endoscopy was done in July 2020 (negative). Today labs show patient is anemic with H/H of 4.5/16.3,. INR is 8.84 MAP in the 90s,   Returned from endoscopy appearing ill tachypneic with chills with new white out of his left lung      A/p  s/p LVAD placement  admission prolonged following GI bleeding, aspiration event, CDI, VAP, chronic abdominal pain, most recently with retroperitoneal bleeding post attempted stent placement  Acute event while on the floor with possible aspiration and required re-intubation and transfer to cTU  Antibiotics with Meropenem  serratia in blood culture on 9/30  and enterobacter in blood culture 10/1 likely from a GI source given the multiple GNRs grown but CT scan without evidence of a specific source raises the possiblilty of GI translocation in the setting on ongoing ischemic bowel?  Continue Meropenem  plan 2 week course (until 10/16)  repeat blood cutlures sent and no growth    Morris Prater MD  [phone]  After 5pm/weekends [phone]

## 2021-10-11 NOTE — PROGRESS NOTE ADULT - SUBJECTIVE AND OBJECTIVE BOX
SUBJECTIVE AND OBJECTIVE: Pt endorsing continued pain and nausea.    INTERVAL HPI/OVERNIGHT EVENTS: No acute events overnight.    DNR on chart:   Allergies    Amiodarone Hydrochloride (Other (Severe))    Intolerances    MEDICATIONS  (STANDING):  chlorhexidine 0.12% Liquid 15 milliLiter(s) Oral Mucosa two times a day  chlorhexidine 2% Cloths 1 Application(s) Topical <User Schedule>  dextrose 5% + sodium chloride 0.45%. 1000 milliLiter(s) (30 mL/Hr) IV Continuous <Continuous>  insulin lispro (ADMELOG) corrective regimen sliding scale   SubCutaneous every 6 hours  meropenem  IVPB 1000 milliGRAM(s) IV Intermittent every 8 hours  methimazole 20 milliGRAM(s) Oral every 8 hours  metoclopramide Injectable 10 milliGRAM(s) IV Push every 8 hours  multivitamin 1 Tablet(s) Oral daily  pantoprazole  Injectable 40 milliGRAM(s) IV Push every 12 hours  predniSONE   Tablet 40 milliGRAM(s) Oral daily  sodium chloride 0.9%. 1000 milliLiter(s) (10 mL/Hr) IV Continuous <Continuous>  spironolactone 12.5 milliGRAM(s) Oral every 12 hours  thiamine 100 milliGRAM(s) Oral daily  vancomycin    Solution 125 milliGRAM(s) Oral every 12 hours    MEDICATIONS  (PRN):  acetaminophen    Suspension .. 650 milliGRAM(s) Enteral Tube every 6 hours PRN Mild Pain (1 - 3)  fentaNYL    Injectable 12.5 MICROGram(s) IV Push every 3 hours PRN Moderate to severe pain  simethicone 80 milliGRAM(s) Chew every 8 hours PRN Gas      ITEMS UNCHECKED ARE NOT PRESENT    PRESENT SYMPTOMS: [ ]Unable to obtain due to poor mentation   Source if other than patient:  [ ]Family   [ ]Team     Pain:  [X]yes [ ]no  QOL impact - Unable to eat, highly negative impact   Location - abdomen  Aggravating factors - Eating  Quality - unable to endorse  Radiation - None  Timing- Unable to endorse  Severity (0-10 scale): Unable to endorse  Minimal acceptable level (0-10 scale):     Dyspnea:                           [ ]Mild [ ]Moderate [ ]Severe  Anxiety:                             [ ]Mild [ ]Moderate [ ]Severe  Fatigue:                             [ ]Mild [ ]Moderate [ ]Severe  Nausea:                             [ ]Mild [ ]Moderate [X]Severe  Loss of appetite:              [ ]Mild [ ]Moderate [ ]Severe  Constipation:                    [ ]Mild [ ]Moderate [ ]Severe    CPOT:    https://www.[email or web address].org/getattachment/[email or web address]/Critical-Care-Pain-Observation-Tool-(CPOT)    PAIN AD Score:	  http://[email or web address]/painad.pdf (Ctrl + left click to view)    Other Symptoms:  [X]All other review of systems negative     Palliative Performance Status Version 2:        30 %      http://[email or web address]/news/palliative_performance_scale_[email or web address].pdf    PHYSICAL EXAM:  Vital Signs Last 24 Hrs  T(C): 37.3 (11 Oct 2021 15:00), Max: 37.3 (11 Oct 2021 15:00)  T(F): 99.1 (11 Oct 2021 15:00), Max: 99.1 (11 Oct 2021 15:00)  HR: 123 (11 Oct 2021 16:00) (89 - 127)  BP: --  BP(mean): --  RR: 24 (11 Oct 2021 16:00) (10 - 36)  SpO2: 99% (11 Oct 2021 16:00) (94% - 100%) I&O's Summary    10 Oct 2021 07:01  -  11 Oct 2021 07:00  --------------------------------------------------------  IN: 1495 mL / OUT: 730 mL / NET: 765 mL    11 Oct 2021 07:01  -  11 Oct 2021 16:55  --------------------------------------------------------  IN: 430 mL / OUT: 845 mL / NET: -415 mL       GENERAL:  [X]Alert  [ ]Oriented x   [ ]Lethargic  [ ]Cachexia  [ ]Unarousable  [X]Verbal  [ ]Non-Verbal  Behavioral:   [ ]Anxiety  [ ]Delirium [ ]Agitation [ ]Other  HEENT:  [X]Normal   [ ]Dry mouth   [ ]ET Tube/Trach  [ ]Oral lesions  PULMONARY:   [X]Clear [ ]Tachypnea  [ ]Audible excessive secretions   [ ]Rhonchi        [ ]Right [ ]Left [ ]Bilateral  [ ]Crackles        [ ]Right [ ]Left [ ]Bilateral  [ ]Wheezing     [ ]Right [ ]Left [ ]Bilateral  [ ]Diminished BS [ ] Right [ ]Left [ ]Bilateral  CARDIOVASCULAR:    [X]Regular [ ]Irregular [ ]Tachy  [ ]Jose [ ]Murmur [ ]Other  GASTROINTESTINAL:  [X]Soft  [ ]Distended   [ ]+BS  [ ]Non tender [ ]Tender  [ ]PEG [ ]OGT/ NGT   Last BM:    GENITOURINARY:  [ ]Normal [ ]Incontinent   [ ]Oliguria/Anuria   [ ]Landrum  MUSCULOSKELETAL:   [ ]Normal   [ ]Weakness  [ ]Bed/Wheelchair bound [ ]Edema  NEUROLOGIC:   [ ]No focal deficits  [ ] Cognitive impairment  [ ] Dysphagia [ ]Dysarthria [ ] Paresis [ ]Other   SKIN:   [ ]Normal  [ ]Rash   [ ]Pressure ulcer(s) [ ]y [ ]n present on admission    CRITICAL CARE:  [ ]Shock Present  [ ]Septic [ ]Cardiogenic [ ]Neurologic [ ]Hypovolemic  [ ]Vasopressors [ ]Inotropes  [ ]Respiratory failure present [ ]Mechanical Ventilation [ ]Non-invasive ventilatory support [ ]High-Flow Mode: CPAP with PS, FiO2: 40, PEEP: 5, PS: 10, ITime: 1, MAP: 8, PIP: 17  [ ]Acute  [ ]Chronic [ ]Hypoxic  [ ]Hypercarbic [ ]Other  [ ]Other organ failure     LABS:                        11.5   13.30 )-----------( 264      ( 11 Oct 2021 00:25 )             34.4   10-11    134<L>  |  96  |  13  ----------------------------<  78  3.1<L>   |  22  |  0.40<L>    Ca    10.0      11 Oct 2021 00:24  Phos  1.6     10-11  Mg     1.5     10-11    TPro  7.7  /  Alb  4.0  /  TBili  1.4<H>  /  DBili  x   /  AST  19  /  ALT  19  /  AlkPhos  147<H>  10-11          RADIOLOGY & ADDITIONAL STUDIES:    Protein Calorie Malnutrition Present: [ ]mild [ ]moderate [ ]severe [ ]underweight [ ]morbid obesity  https://www.andeal.org/vault/2440/web/files/ONC/Table_Clinical%20Characteristics%20to%20Document%20Malnutrition-White%20JV%20et%20al%199392.pdf    Height (cm): 177.8 (10-04-21 @ 14:21), 177.8 (01-11-21 @ 09:28)  Weight (kg): 61.6 (10-04-21 @ 14:21), 74 (01-12-21 @ 10:48)  BMI (kg/m2): 19.5 (10-04-21 @ 14:21), 23.4 (01-12-21 @ 10:48)    [X]PPSV2 < or = 30%  [ ]significant weight loss [X]poor nutritional intake [ ]anasarca    [ ]Artificial Nutrition    REFERRALS:   [ ]Chaplaincy  [ ]Hospice  [ ]Child Life  [X]Social Work  [ ]Case management [ ]Holistic Therapy     Goals of Care Document:     SUBJECTIVE AND OBJECTIVE: Pt endorsing continued pain and nausea.    INTERVAL HPI/OVERNIGHT EVENTS: No acute events overnight.    DNR on chart:   Allergies    Amiodarone Hydrochloride (Other (Severe))    Intolerances    MEDICATIONS  (STANDING):  chlorhexidine 0.12% Liquid 15 milliLiter(s) Oral Mucosa two times a day  chlorhexidine 2% Cloths 1 Application(s) Topical <User Schedule>  dextrose 5% + sodium chloride 0.45%. 1000 milliLiter(s) (30 mL/Hr) IV Continuous <Continuous>  insulin lispro (ADMELOG) corrective regimen sliding scale   SubCutaneous every 6 hours  meropenem  IVPB 1000 milliGRAM(s) IV Intermittent every 8 hours  methimazole 20 milliGRAM(s) Oral every 8 hours  metoclopramide Injectable 10 milliGRAM(s) IV Push every 8 hours  multivitamin 1 Tablet(s) Oral daily  pantoprazole  Injectable 40 milliGRAM(s) IV Push every 12 hours  predniSONE   Tablet 40 milliGRAM(s) Oral daily  sodium chloride 0.9%. 1000 milliLiter(s) (10 mL/Hr) IV Continuous <Continuous>  spironolactone 12.5 milliGRAM(s) Oral every 12 hours  thiamine 100 milliGRAM(s) Oral daily  vancomycin    Solution 125 milliGRAM(s) Oral every 12 hours    MEDICATIONS  (PRN):  acetaminophen    Suspension .. 650 milliGRAM(s) Enteral Tube every 6 hours PRN Mild Pain (1 - 3)  fentaNYL    Injectable 12.5 MICROGram(s) IV Push every 3 hours PRN Moderate to severe pain  simethicone 80 milliGRAM(s) Chew every 8 hours PRN Gas      ITEMS UNCHECKED ARE NOT PRESENT    PRESENT SYMPTOMS: [ ]Unable to obtain due to poor mentation   Source if other than patient:  [ ]Family   [ ]Team     Pain:  [X]yes [ ]no  QOL impact - Unable to eat, highly negative impact   Location - abdomen  Aggravating factors - Eating  Quality - unable to endorse  Radiation - None  Timing- Unable to endorse  Severity (0-10 scale): Unable to endorse  Minimal acceptable level (0-10 scale):     Dyspnea:                           [ ]Mild [ ]Moderate [ ]Severe  Anxiety:                             [ ]Mild [ ]Moderate [ ]Severe  Fatigue:                             [ ]Mild [ ]Moderate [ ]Severe  Nausea:                             [ ]Mild [ ]Moderate [X]Severe  Loss of appetite:              [ ]Mild [ ]Moderate [ ]Severe  Constipation:                    [ ]Mild [ ]Moderate [ ]Severe    CPOT:    https://www.[facility].org/getattachment/[email or web address]/Critical-Care-Pain-Observation-Tool-(CPOT)    PAIN AD Score:	  http://geriatrictoolkit.[facility]/cog/painad.pdf (Ctrl + left click to view)    Other Symptoms:  [X]All other review of systems negative     Palliative Performance Status Version 2:        30 %      http://[email or web address]/files/news/palliative_performance_scale_[email or web address].pdf    PHYSICAL EXAM:  Vital Signs Last 24 Hrs  T(C): 37.3 (11 Oct 2021 15:00), Max: 37.3 (11 Oct 2021 15:00)  T(F): 99.1 (11 Oct 2021 15:00), Max: 99.1 (11 Oct 2021 15:00)  HR: 123 (11 Oct 2021 16:00) (89 - 127)  BP: --  BP(mean): --  RR: 24 (11 Oct 2021 16:00) (10 - 36)  SpO2: 99% (11 Oct 2021 16:00) (94% - 100%) I&O's Summary    10 Oct 2021 07:01  -  11 Oct 2021 07:00  --------------------------------------------------------  IN: 1495 mL / OUT: 730 mL / NET: 765 mL    11 Oct 2021 07:01  -  11 Oct 2021 16:55  --------------------------------------------------------  IN: 430 mL / OUT: 845 mL / NET: -415 mL       GENERAL:  [X]Alert  [ ]Oriented x   [ ]Lethargic  [ ]Cachexia  [ ]Unarousable  [X]Verbal  [ ]Non-Verbal  Behavioral:   [ ]Anxiety  [ ]Delirium [ ]Agitation [ ]Other  HEENT:  [X]Normal   [ ]Dry mouth   [ ]ET Tube/Trach  [ ]Oral lesions  PULMONARY:   [X]Clear [ ]Tachypnea  [ ]Audible excessive secretions   [ ]Rhonchi        [ ]Right [ ]Left [ ]Bilateral  [ ]Crackles        [ ]Right [ ]Left [ ]Bilateral  [ ]Wheezing     [ ]Right [ ]Left [ ]Bilateral  [ ]Diminished BS [ ] Right [ ]Left [ ]Bilateral  CARDIOVASCULAR:    [X]Regular [ ]Irregular [ ]Tachy  [ ]Jose [ ]Murmur [ ]Other  GASTROINTESTINAL:  [X]Soft  [ ]Distended   [ ]+BS  [ ]Non tender [ ]Tender  [ ]PEG [ ]OGT/ NGT   Last BM:    GENITOURINARY:  [ ]Normal [ ]Incontinent   [ ]Oliguria/Anuria   [ ]Landrum  MUSCULOSKELETAL:   [ ]Normal   [x ]Weakness  [ ]Bed/Wheelchair bound [ ]Edema  NEUROLOGIC:   [ ]No focal deficits  [ ] Cognitive impairment  [ x] Dysphagia [ ]Dysarthria [ ] Paresis [ ]Other   SKIN:   [ ]Normal  [ ]Rash   [ ]Pressure ulcer(s) [ ]y [ ]n present on admission    CRITICAL CARE:  [ ]Shock Present  [ ]Septic [ ]Cardiogenic [ ]Neurologic [ ]Hypovolemic  [ ]Vasopressors [ ]Inotropes  [x ]Respiratory failure present [ ]Mechanical Ventilation [ ]Non-invasive ventilatory support [ ]High-Flow Mode: CPAP with PS, FiO2: 40, PEEP: 5, PS: 10, ITime: 1, MAP: 8, PIP: 17  [ ]Acute  [ ]Chronic [ ]Hypoxic  [ ]Hypercarbic [ ]Other  [ ]Other organ failure     LABS:                        11.5   13.30 )-----------( 264      ( 11 Oct 2021 00:25 )             34.4   10-11    134<L>  |  96  |  13  ----------------------------<  78  3.1<L>   |  22  |  0.40<L>    Ca    10.0      11 Oct 2021 00:24  Phos  1.6     10-11  Mg     1.5     10-11    TPro  7.7  /  Alb  4.0  /  TBili  1.4<H>  /  DBili  x   /  AST  19  /  ALT  19  /  AlkPhos  147<H>  10-11          RADIOLOGY & ADDITIONAL STUDIES: all recent imaging reviewed    Protein Calorie Malnutrition Present: [ ]mild [ ]moderate [ ]severe [ ]underweight [ ]morbid obesity  https://www.andeal.org/vault/2440/web/files/ONC/Table_Clinical%20Characteristics%20to%20Document%20Malnutrition-White%20JV%20et%20al%504636.pdf    Height (cm): 177.8 (10-04-21 @ 14:21), 177.8 (01-11-21 @ 09:28)  Weight (kg): 61.6 (10-04-21 @ 14:21), 74 (01-12-21 @ 10:48)  BMI (kg/m2): 19.5 (10-04-21 @ 14:21), 23.4 (01-12-21 @ 10:48)    [X]PPSV2 < or = 30%  [ ]significant weight loss [X]poor nutritional intake [ ]anasarca    [ ]Artificial Nutrition    REFERRALS:   [ ]Chaplaincy  [ ]Hospice  [ ]Child Life  [X]Social Work  [ ]Case management [ ]Holistic Therapy     Goals of Care Document:

## 2021-10-11 NOTE — PROGRESS NOTE ADULT - ASSESSMENT
50 y/o POD #7 for tracheostomy 2/2 respiratory failure. Pt is doing well, #6 DCT cuffed in place, on the vent, with minimal serosanguinous oozing from stoma, no active bleed. sutures x4 and velcro tie in place.

## 2021-10-11 NOTE — PROGRESS NOTE ADULT - PROBLEM SELECTOR PLAN 5
GOC meeting needed to establish patient's wishes regarding surgical intervention.  - Plan for GOC with palliative, heart failure and possibly vascular surgery tomorrow GOC meeting needed to establish patient's wishes regarding surgical intervention.  - Plan for GOC with palliative, heart failure and possibly vascular surgery tomorrow- our availability is from 10-3:30pm. Attempted to page vascular surgery to coordinate as they left sign off note, no returned call

## 2021-10-11 NOTE — PROGRESS NOTE ADULT - SUBJECTIVE AND OBJECTIVE BOX
Subjective: Patient seen and examined resting in bed. ON no issues    Medications:  acetaminophen    Suspension .. 650 milliGRAM(s) Enteral Tube every 6 hours PRN  chlorhexidine 0.12% Liquid 15 milliLiter(s) Oral Mucosa two times a day  chlorhexidine 2% Cloths 1 Application(s) Topical <User Schedule>  dextrose 5% + sodium chloride 0.45%. 1000 milliLiter(s) IV Continuous <Continuous>  fentaNYL    Injectable 12.5 MICROGram(s) IV Push every 3 hours PRN  insulin lispro (ADMELOG) corrective regimen sliding scale   SubCutaneous every 6 hours  meropenem  IVPB 1000 milliGRAM(s) IV Intermittent every 8 hours  methimazole 20 milliGRAM(s) Oral every 8 hours  metoclopramide Injectable 10 milliGRAM(s) IV Push every 8 hours  multivitamin 1 Tablet(s) Oral daily  pantoprazole  Injectable 40 milliGRAM(s) IV Push every 12 hours  predniSONE   Tablet 40 milliGRAM(s) Oral daily  simethicone 80 milliGRAM(s) Chew every 8 hours PRN  sodium chloride 0.9%. 1000 milliLiter(s) IV Continuous <Continuous>  spironolactone 12.5 milliGRAM(s) Oral every 12 hours  thiamine 100 milliGRAM(s) Oral daily  vancomycin    Solution 125 milliGRAM(s) Oral every 12 hours    Vital Signs Last 24 Hrs  T(C): 37 (11 Oct 2021 00:00), Max: 37 (11 Oct 2021 00:00)  T(F): 98.6 (11 Oct 2021 00:00), Max: 98.6 (11 Oct 2021 00:00)  HR: 91 (11 Oct 2021 06:00) (89 - 127)  BP: --  BP(mean): --  RR: 12 (11 Oct 2021 06:00) (10 - 36)  SpO2: 100% (11 Oct 2021 06:00) (94% - 100%)    Mode: AC/ CMV (Assist Control/ Continuous Mandatory Ventilation)  RR (machine): 12  TV (machine): 500  FiO2: 40  PEEP: 5  ITime: 1  MAP: 11  PIP: 19      Weight in k.9 (10-11 @ 00:00)    I&O's Summary    10 Oct 2021 07:01  -  11 Oct 2021 07:00  --------------------------------------------------------  IN: 1495 mL / OUT: 730 mL / NET: 765 mL        Physical Exam  General: No distress.   Neck: JVP not appreciated   Chest: +trach, +vent sounds b/l  CV: +VAD hum  Abdomen: Soft, non-distended, tenderness noted over epigastric region, +biliary drain, +kevin tuibe   Neurology: Alert and oriented times three    LVAD Interrogation  VAD TYPE HM 2  Speed 9200  Flow 6  Power 5.4  PI 6.5  Assessment of driveline exit site: driveline dressing c/d/i  Programming changes: no changes made    Labs:                        11.5   13.30 )-----------( 264      ( 11 Oct 2021 00:25 )             34.4     10-11    134<L>  |  96  |  13  ----------------------------<  78  3.1<L>   |  22  |  0.40<L>    Ca    10.0      11 Oct 2021 00:24  Phos  1.6     10-11  Mg     1.5     10-11    TPro  7.7  /  Alb  4.0  /  TBili  1.4<H>  /  DBili  x   /  AST  19  /  ALT  19  /  AlkPhos  147<H>  10-11              Lactate Dehydrogenase, Serum: 216 U/L (10-11 @ 00:24)  Lactate Dehydrogenase, Serum: 234 U/L (10-10 @ 01:22)  Lactate Dehydrogenase, Serum: 317 U/L (10-09 @ 00:18)

## 2021-10-11 NOTE — PROGRESS NOTE ADULT - ATTENDING COMMENTS
S/p tracheostomy; surgical site in neck soft and flat. No crepitous or excessive oozing.  Trach secured. will follow

## 2021-10-11 NOTE — PROGRESS NOTE ADULT - SUBJECTIVE AND OBJECTIVE BOX
RICKY HAMPTON  MRN-[id number]  Patient is a 66y old  Male who presents with a chief complaint of Anemia, Supratherapeutic INR, Dark Stools (11 Oct 2021 07:45)    HPI:  65M PMH ACC/AHA stage D HF due to NICM HM2 LVAD , TV annuloplasty ring 9/12/17 as destination therapy due to severe peripheral artery disease with significant stenosis  SIADH, Depression, CKD-3 with hyperkalemia, past E. coli UTIs, driveline drainage (1/7/21) and COVID-19 (back in April 2020)  He was recently seen in clinic where he complained of abdominal pain and dark stools w constipation back in May. He presents to [facility] ER today weakness and fatigue, moderate and + Black stools for three days, on coumadin secondary to warfarin use in the setting of an LVAD. Patient has required transfusions for GIB in the past. Mostly recently back in jan 2021 pt had anemia with dark stools. No interventions was done at that time. However Last Endoscopy was done in July 2020 (negative). Today labs show patient is anemic with H/H of 4.5/16.3,. INR is 8.84 MAP in the 90s, Temp 35.1. He denies any chest pain, shortness of breath, dizziness, abd pain, nausea or vomiting.       (14 Jun 2021 16:57)      Surgery/Hospital Course:  6/14 admit for melena w/ anemia, INR 8.84   6/15 Capsul study (+) for small bowel bleed, balloon endoscopy (old blood in prox ileum); post EGD - septic w/ L opacity, re-intubated for concern for aspiration, TTE (Mod MR, decrease biV w/ interventricular septum boweing towards R)  6/17 bronch   6/20 +C Diff   6/22 CT C/A/P: Fluid filled colon which may be 2/2 rapid transit. Small bilateral pleffs with associates. Compressive atelectasis New ISABELLE & LLL  parenchymal opacities, suspicious for pneumonia. Moderate stenosis in the proximal superior mesenteric artery.   6/25 #8 Shiley trach at bedside   7/1 LVAD speed increased to 9200  7/2 Bronch  7/20 TC since 7/7. Patient transferred to SDU.   7/23 INR today 2.64.  H&H 7.3/24 this AM.  Will repeat CBC at noon, and will send stool guaiac Patient with persistent abdominal tenderness, rate of tube feeds decreased.  No nausea/vomiting.    7/24 INR today 2.4. H&H 9.1/28.6 low flow overnight /N&V, refusing Tube feeds on D5 1/2 normal  @50 cc/hr  7/25 INR 2.69  H&H 7.7/25.1 refusing Tube feeds on D5 1/2 normal  @50 cc/hr. This am + BM Melena Dr Oneill HF  aware- PRBC x1  GI team consulted -  NPO  plan on study in am-  D/w Dr Cadet Patient  to return to CTU for further management; 1PRBCS   7/27 Post op INR 2.2 today.  No bleeding. BC + for SM.  Pt is hypotensive requiring pressor and inotropic support.  ID follow up today on Cefepime will follow.  7/26 R PTC for PTX   7/28 CT C/A/P: sub q emphysema in R chest wall, GGO RUL, small ascites CTH negative; Abd US: GB thickening, pericholecystic fluid    7/31 Perchole drain in place continues to drain total output overnight 133.  Fever today 38.8   8/1 duplex LE negative   8/7 Patient with persistent abdominal pain, refusing tube feeds and medications, Psych consulted  8/13 CTA A/P ordered to r/o mesenteric ischemia 2/2 persistent anorexia, nausea, vomiting. Revealed:  Evaluation of the mesenteric vessels is limited by streak artifact from LVAD. There appears to be severe stenosis of the proximal SMA; abdominal mesenteric doppler is recommended for further evaluation. 2.  No small bowel findings to suggest acute mesenteric ischemia. 3.  Focal dissections involving the right and left common iliac arteries.  8/15: Cultures resulted BC 1/2 +GPC in clusters, SC enterobacter; mesenteric duplex: borderline stenosis of proximal SMA  8/27: CT C/A/P noncon: Nondular opacities in R lung apex w/cavitation, abd nl  8/31:  Continue current care, treatment of thyrotoxicosis with medications as per endocrine, d/c ABX as per team.   9/8 RUQ sono: Contracted gallbladder with cholecystostomy tube in place.  9/10 failed SMA stent, L fem PSA, s/p 3U PRCB  9/12 CTA Abd/Pelvis L RP hematoma   9/24 Trach decannulated   9/26 intubated fro resp distress for increased WOB, LL pneumonia; CT C/A/P: progressive ISABELLE opacities and L consolidations, multifocal pneumonia   9/30 TTE (EF 25%, decreased RV, mod MR)   10/3 VT -> Lidocaine gtt   10/4 Trach size 6 DCT cuff  10/5 CT abd (neg for intra-abd abcess, severe stenosis of prox SMA and b/l iliac arteries)    Today:    REVIEW OF SYSTEMS:  Unable to obtain, limited secondary to pts trach     ICU Vital Signs Last 24 Hrs  T(C): 37 (11 Oct 2021 00:00), Max: 37 (11 Oct 2021 00:00)  T(F): 98.6 (11 Oct 2021 00:00), Max: 98.6 (11 Oct 2021 00:00)  HR: 91 (11 Oct 2021 06:00) (89 - 127)  BP: --  BP(mean): --  ABP: 100/80 (11 Oct 2021 06:00) (90/77 - 151/145)  ABP(mean): 90 (11 Oct 2021 06:00) (82 - 200)  RR: 12 (11 Oct 2021 06:00) (10 - 36)  SpO2: 100% (11 Oct 2021 06:00) (94% - 100%)      Physical Exam:  Gen:  NAD  CNS: moves all extremities to command   Neck: no JVD, +trach   RES : coarse breath sounds, no wheezing    CVS: +LVAD hum   Abd: Soft. Sybil drain with bilious fluid. Positive BS throughout. Mild RUQ abdominal tenderness by perc sybil.  : Landrum intact draining urine without hematuria.  Skin: No rash, erythema, cyanosis.  Vasc: Warm and well-perfused.  Ext:  no edema    ============================I/O===========================   I&O's Detail    10 Oct 2021 07:01  -  11 Oct 2021 07:00  --------------------------------------------------------  IN:    dextrose 5% + sodium chloride 0.45%: 690 mL    IV PiggyBack: 575 mL    sodium chloride 0.9%: 230 mL  Total IN: 1495 mL    OUT:    Drain (mL): 150 mL    Indwelling Catheter - Urethral (mL): 30 mL    Voided (mL): 550 mL  Total OUT: 730 mL    Total NET: 765 mL        ============================ LABS =========================                        11.5   13.30 )-----------( 264      ( 11 Oct 2021 00:25 )             34.4     10-11    134<L>  |  96  |  13  ----------------------------<  78  3.1<L>   |  22  |  0.40<L>    Ca    10.0      11 Oct 2021 00:24  Phos  1.6     10-11  Mg     1.5     10-11    TPro  7.7  /  Alb  4.0  /  TBili  1.4<H>  /  DBili  x   /  AST  19  /  ALT  19  /  AlkPhos  147<H>  10-11    LIVER FUNCTIONS - ( 11 Oct 2021 00:24 )  Alb: 4.0 g/dL / Pro: 7.7 g/dL / ALK PHOS: 147 U/L / ALT: 19 U/L / AST: 19 U/L / GGT: x             ABG - ( 11 Oct 2021 00:14 )  pH, Arterial: 7.52  pH, Blood: x     /  pCO2: 34    /  pO2: 192   / HCO3: 28    / Base Excess: 5.0   /  SaO2: 100.0               ======================Micro/Rad/Cardio=================  Culture: Reviewed   CXR: Reviewed  Echo:Reviewed  ======================================================  PAST MEDICAL & SURGICAL HISTORY:  CHF (congestive heart failure)    CAD (coronary artery disease)    Depression    Pleural effusion    History of 2019 novel coronavirus disease (COVID-19)  april 2020    Hemorrhoids    Bleeding hemorrhoids    Peripheral arterial disease    Claudication    BPH with urinary obstruction    ACC/AHA stage D systolic heart failure    Anticoagulation goal of INR 2.0 to 2.5    Falls    Clavicle fracture    CKD (chronic kidney disease), stage III    Iron deficiency anemia    H/O epistaxis    Vertigo    GI bleed    S/P TVR (tricuspid valve repair)    S/P ventricular assist device    S/P endoscopy      ====================ASSESSMENT ==============  Stage D Nonischemic Cardiomyopathy, Status Post HM2 on 9/2017    Cardiogenic shock  Hemodynamic instability   Acute hypoxemic respiratory failure s/p trach 6/25, decannulated on 9/24; Reintubated on 9/26   GI bleed , Status Post Enteroscopy 6/14  Anemia, in setting of melena   Chronic Kidney Disease  Stress hyperglycemia   C.diff positive on 6/20   Hypovolemic shock  Septic shock  Leukocytosis  GB thickening/percholecystic s/p perc choley by IT 7/30  SMA stenosis  Serratia/citrobacter pneumonia 7/7  Stenotrophomonas pneumonia 8/1  Enterobacter pneumonia 8/13  Nasuea/vomiting  Deconditioning    Plan:  ====================== NEUROLOGY=====================  Continue close monitoring of neuro status   PRN Fentanyl and PRN Tylenol for analgesia     acetaminophen    Suspension .. 650 milliGRAM(s) Enteral Tube every 6 hours PRN Mild Pain (1 - 3)  fentaNYL    Injectable 12.5 MICROGram(s) IV Push every 3 hours PRN Moderate to severe pain    ==================== RESPIRATORY======================  Acute hypoxemic respiratory failure s/p #8 shiley trach on 6/25; decannulated on 9/24; Reintubated on 9/26; trach size 6 DCT cuff on 10/4   Continue close monitoring of respiratory rate and breathing pattern, following of ABG’s with A-line monitoring, continuous pulse oximetry monitoring.     Mechanical Ventilation:  Mode: AC/ CMV (Assist Control/ Continuous Mandatory Ventilation)  RR (machine): 12  TV (machine): 500  FiO2: 40  PEEP: 5  ITime: 1  MAP: 11  PIP: 19      ====================CARDIOVASCULAR==================  Stage D Nonischemic Cardiomyopathy, Status Post HM2 on 9/2017; LVAD settings 9200, flow 5.8  TTE 10/4:  Severely decreased LV systolic function, Diffuse hypokinesis. Mild AR. No pericardial effusion   VT on 10/4, s/p Lidocaine drip, continue close tele monitoring   Holding off on Propranolol for now     ===================HEMATOLOGIC/ONC ===================  CTA A/P on 9/12 +L RP hematoma   Acute blood loss anemia, monitor H&H/Plts   Holding coumadin and ASA given recurrent GI bleeding, continue monitoring LDH     ===================== RENAL =========================  Hx of CKD, continue monitoring urine output, I&OS, BUN/Cr   Euvolemic, even fluid balance, currently off diuretics.    Replete lytes PRN. Keep K> 4 and Mg >2  C/w Aldactone for diuresis    spironolactone 12.5 milliGRAM(s) Oral every 12 hours    ==================== GASTROINTESTINAL===================  TFs held in setting of ongoing nausea  CT A/P 10/5 neg for intra-abd abcess, severe stenosis of prox SMA and b/l iliac arteries  CTA A/P 8/13: Evaluation of the mesenteric vessels is limited by streak artifact from LVAD. There appears to be severe stenosis of the proximal SMA; abdominal mesenteric doppler is recommended for further evaluation. 2.  No small bowel findings to suggest acute mesenteric ischemia. 3.  Focal dissections involving the right and left common iliac arteries.  Mesenteric duplex on 8/15 borderline stenosis of proximal SMA, s/p failed SMA stent, L fem RP hematoma on 9/10; Will f/u Vascular for surgical intervention   Reglan for gut motility  PRN Simethicone for gas    dextrose 5% + sodium chloride 0.45%. 1000 milliLiter(s) (30 mL/Hr) IV Continuous <Continuous>  multivitamin 1 Tablet(s) Oral daily  GI prophylaxis, pantoprazole  Injectable 40 milliGRAM(s) IV Push every 12 hours  simethicone 80 milliGRAM(s) Chew every 8 hours PRN Gas  sodium chloride 0.9%. 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       Per endocrine      - c/w Methimazole, adjust based on labs        - Check Free T4 and total T3       - c/w Prednisone     insulin lispro (ADMELOG) corrective regimen sliding scale   SubCutaneous every 6 hours  methimazole 20 milliGRAM(s) Oral every 8 hours  predniSONE   Tablet 40 milliGRAM(s) Oral daily    ========================INFECTIOUS DISEASE================  Afebrile, WBC downtrending   Continue trending WBC and monitoring fever curve   CT C/A/P 9/26: progressive ISABELLE opacities and L consolidations, multifocal pneumonia  BCx 9/30 + Serratia marcescens, BCx 1/2 on 10/1 +Enterobacter cloacae complex, c/w Meropenem - to be completed 10/15   BCx 10/5 + 10/5 NGTD   Vancomycin solution for C.diff prophylaxis    meropenem  IVPB 1000 milliGRAM(s) IV Intermittent every 8 hours  vancomycin    Solution 125 milliGRAM(s) Oral every 12 hours      Patient requires continuous monitoring with bedside rhythm monitoring, pulse ox monitoring, and intermittent blood gas analysis. Care plan discussed with ICU care team. Patient remained critical and at risk for life threatening decompensation.     By signing my name below, I, Agnieszka Cherry, attest that this documentation has been prepared under the direction and in the presence of CLAUDIA Bey   Electronically signed: Cesia Shaffer, 10-11-21 @ 08:09    I, Luciano Ayala, personally performed the services described in this documentation. all medical record entries made by the luisibmel were at my direction and in my presence. I have reviewed the chart and agree that the record reflects my personal performance and is accurate and complete  Electronically signed: CLAUDIA Bey        RICKY HAMPTON  MRN-[id number]  Patient is a 66y old  Male who presents with a chief complaint of Anemia, Supratherapeutic INR, Dark Stools (11 Oct 2021 07:45)    HPI:  65M PMH ACC/AHA stage D HF due to NICM HM2 LVAD , TV annuloplasty ring 9/12/17 as destination therapy due to severe peripheral artery disease with significant stenosis  SIADH, Depression, CKD-3 with hyperkalemia, past E. coli UTIs, driveline drainage (1/7/21) and COVID-19 (back in April 2020)  He was recently seen in clinic where he complained of abdominal pain and dark stools w constipation back in May. He presents to [facility] ER today weakness and fatigue, moderate and + Black stools for three days, on coumadin secondary to warfarin use in the setting of an LVAD. Patient has required transfusions for GIB in the past. Mostly recently back in jan 2021 pt had anemia with dark stools. No interventions was done at that time. However Last Endoscopy was done in July 2020 (negative). Today labs show patient is anemic with H/H of 4.5/16.3,. INR is 8.84 MAP in the 90s, Temp 35.1. He denies any chest pain, shortness of breath, dizziness, abd pain, nausea or vomiting.       (14 Jun 2021 16:57)      Surgery/Hospital Course:  6/14 admit for melena w/ anemia, INR 8.84   6/15 Capsul study (+) for small bowel bleed, balloon endoscopy (old blood in prox ileum); post EGD - septic w/ L opacity, re-intubated for concern for aspiration, TTE (Mod MR, decrease biV w/ interventricular septum boweing towards R)  6/17 bronch   6/20 +C Diff   6/22 CT C/A/P: Fluid filled colon which may be 2/2 rapid transit. Small bilateral pleffs with associates. Compressive atelectasis New ISABELLE & LLL  parenchymal opacities, suspicious for pneumonia. Moderate stenosis in the proximal superior mesenteric artery.   6/25 #8 Shiley trach at bedside   7/1 LVAD speed increased to 9200  7/2 Bronch  7/20 TC since 7/7. Patient transferred to SDU.   7/23 INR today 2.64.  H&H 7.3/24 this AM.  Will repeat CBC at noon, and will send stool guaiac Patient with persistent abdominal tenderness, rate of tube feeds decreased.  No nausea/vomiting.    7/24 INR today 2.4. H&H 9.1/28.6 low flow overnight /N&V, refusing Tube feeds on D5 1/2 normal  @50 cc/hr  7/25 INR 2.69  H&H 7.7/25.1 refusing Tube feeds on D5 1/2 normal  @50 cc/hr. This am + BM Melena Dr Oneill HF  aware- PRBC x1  GI team consulted -  NPO  plan on study in am-  D/w Dr Cadet Patient  to return to CTU for further management; 1PRBCS   7/27 Post op INR 2.2 today.  No bleeding. BC + for SM.  Pt is hypotensive requiring pressor and inotropic support.  ID follow up today on Cefepime will follow.  7/26 R PTC for PTX   7/28 CT C/A/P: sub q emphysema in R chest wall, GGO RUL, small ascites CTH negative; Abd US: GB thickening, pericholecystic fluid    7/31 Perchole drain in place continues to drain total output overnight 133.  Fever today 38.8   8/1 duplex LE negative   8/7 Patient with persistent abdominal pain, refusing tube feeds and medications, Psych consulted  8/13 CTA A/P ordered to r/o mesenteric ischemia 2/2 persistent anorexia, nausea, vomiting. Revealed:  Evaluation of the mesenteric vessels is limited by streak artifact from LVAD. There appears to be severe stenosis of the proximal SMA; abdominal mesenteric doppler is recommended for further evaluation. 2.  No small bowel findings to suggest acute mesenteric ischemia. 3.  Focal dissections involving the right and left common iliac arteries.  8/15: Cultures resulted BC 1/2 +GPC in clusters, SC enterobacter; mesenteric duplex: borderline stenosis of proximal SMA  8/27: CT C/A/P noncon: Nondular opacities in R lung apex w/cavitation, abd nl  8/31:  Continue current care, treatment of thyrotoxicosis with medications as per endocrine, d/c ABX as per team.   9/8 RUQ sono: Contracted gallbladder with cholecystostomy tube in place.  9/10 failed SMA stent, c/b RP bleed s/p 3U PRCB  9/12 CTA Abd/Pelvis L RP hematoma   9/24 Trach decannulated   9/26 intubated fro resp distress for increased WOB, LL pneumonia; CT C/A/P: progressive ISABELLE opacities and L consolidations, multifocal pneumonia   9/30 TTE (EF 25%, decreased RV, mod MR)   10/3 VT -> Lidocaine gtt   10/4 Trach size 6 DCT cuff  10/5 CT abd (neg for intra-abd abcess, severe stenosis of prox SMA and b/l iliac arteries)    Today:  Patient refusing meds this AM, ENT to return for removal of sutures at a later time. TFs held for ongoing nausea / recent emesis. Stenosis of proximal SMA, s/p failed SMA stent c/b RP bleed on 9/10 - vascular to discuss potential re-op via subclavian approach with patient. If patient refuses will have to further discuss GOC/palliative care.     REVIEW OF SYSTEMS:  Unable to obtain, limited secondary to pts trach     ICU Vital Signs Last 24 Hrs  T(C): 37 (11 Oct 2021 00:00), Max: 37 (11 Oct 2021 00:00)  T(F): 98.6 (11 Oct 2021 00:00), Max: 98.6 (11 Oct 2021 00:00)  HR: 91 (11 Oct 2021 06:00) (89 - 127)  BP: --  BP(mean): --  ABP: 100/80 (11 Oct 2021 06:00) (90/77 - 151/145)  ABP(mean): 90 (11 Oct 2021 06:00) (82 - 200)  RR: 12 (11 Oct 2021 06:00) (10 - 36)  SpO2: 100% (11 Oct 2021 06:00) (94% - 100%)      Physical Exam:  Gen:  NAD  CNS: moves all extremities to command   Neck: no JVD, +trach   RES : coarse breath sounds, no wheezing    CVS: +LVAD hum   Abd: Soft. Sybil drain with bilious fluid. Positive BS throughout. Mild RUQ abdominal tenderness by perc sybil.  : Landrum intact draining urine without hematuria.  Skin: No rash, erythema, cyanosis.  Vasc: Warm and well-perfused.  Ext:  no edema    ============================I/O===========================   I&O's Detail    10 Oct 2021 07:01  -  11 Oct 2021 07:00  --------------------------------------------------------  IN:    dextrose 5% + sodium chloride 0.45%: 690 mL    IV PiggyBack: 575 mL    sodium chloride 0.9%: 230 mL  Total IN: 1495 mL    OUT:    Drain (mL): 150 mL    Indwelling Catheter - Urethral (mL): 30 mL    Voided (mL): 550 mL  Total OUT: 730 mL    Total NET: 765 mL        ============================ LABS =========================                        11.5   13.30 )-----------( 264      ( 11 Oct 2021 00:25 )             34.4     10-11    134<L>  |  96  |  13  ----------------------------<  78  3.1<L>   |  22  |  0.40<L>    Ca    10.0      11 Oct 2021 00:24  Phos  1.6     10-11  Mg     1.5     10-11    TPro  7.7  /  Alb  4.0  /  TBili  1.4<H>  /  DBili  x   /  AST  19  /  ALT  19  /  AlkPhos  147<H>  10-11    LIVER FUNCTIONS - ( 11 Oct 2021 00:24 )  Alb: 4.0 g/dL / Pro: 7.7 g/dL / ALK PHOS: 147 U/L / ALT: 19 U/L / AST: 19 U/L / GGT: x             ABG - ( 11 Oct 2021 00:14 )  pH, Arterial: 7.52  pH, Blood: x     /  pCO2: 34    /  pO2: 192   / HCO3: 28    / Base Excess: 5.0   /  SaO2: 100.0               ======================Micro/Rad/Cardio=================  Culture: Reviewed   CXR: Reviewed  Echo:Reviewed  ======================================================  PAST MEDICAL & SURGICAL HISTORY:  CHF (congestive heart failure)    CAD (coronary artery disease)    Depression    Pleural effusion    History of 2019 novel coronavirus disease (COVID-19)  april 2020    Hemorrhoids    Bleeding hemorrhoids    Peripheral arterial disease    Claudication    BPH with urinary obstruction    ACC/AHA stage D systolic heart failure    Anticoagulation goal of INR 2.0 to 2.5    Falls    Clavicle fracture    CKD (chronic kidney disease), stage III    Iron deficiency anemia    H/O epistaxis    Vertigo    GI bleed    S/P TVR (tricuspid valve repair)    S/P ventricular assist device    S/P endoscopy      ====================ASSESSMENT ==============  Stage D Nonischemic Cardiomyopathy, Status Post HM2 on 9/2017    Cardiogenic shock  Hemodynamic instability   Acute hypoxemic respiratory failure s/p trach 6/25, decannulated on 9/24; Reintubated on 9/26   GI bleed , Status Post Enteroscopy 6/14  Anemia, in setting of melena   Chronic Kidney Disease  Stress hyperglycemia   C.diff positive on 6/20   Hypovolemic shock  Septic shock  Leukocytosis  GB thickening/percholecystic s/p perc choley by IT 7/30  SMA stenosis  Serratia/citrobacter pneumonia 7/7  Stenotrophomonas pneumonia 8/1  Enterobacter pneumonia 8/13  Nasuea/vomiting  Deconditioning    Plan:  ====================== NEUROLOGY=====================  Continue close monitoring of neuro status   PRN Fentanyl and PRN Tylenol for analgesia     acetaminophen    Suspension .. 650 milliGRAM(s) Enteral Tube every 6 hours PRN Mild Pain (1 - 3)  fentaNYL    Injectable 12.5 MICROGram(s) IV Push every 3 hours PRN Moderate to severe pain    ==================== RESPIRATORY======================  Acute hypoxemic respiratory failure s/p #8 shiley trach on 6/25; decannulated on 9/24; Reintubated on 9/26; trach size 6 DCT cuff on 10/4 - ENT to return later for suture removal   Continue close monitoring of respiratory rate and breathing pattern, following of ABG’s with A-line monitoring, continuous pulse oximetry monitoring.     Mechanical Ventilation:  Mode: AC/ CMV (Assist Control/ Continuous Mandatory Ventilation)  RR (machine): 12  TV (machine): 500  FiO2: 40  PEEP: 5  ITime: 1  MAP: 11  PIP: 19      ====================CARDIOVASCULAR==================  Stage D Nonischemic Cardiomyopathy, Status Post HM2 on 9/2017; LVAD settings 9200, flow 5.8  TTE 10/4:  Severely decreased LV systolic function, Diffuse hypokinesis. Mild AR. No pericardial effusion   VT on 10/4, s/p Lidocaine drip, continue close tele monitoring   Holding off on Propranolol for now     ===================HEMATOLOGIC/ONC ===================  CTA A/P on 9/12 +L RP hematoma   Acute blood loss anemia, monitor H&H/Plts   Holding coumadin and ASA given recurrent GI bleeding, continue monitoring LDH     ===================== RENAL =========================  Hx of CKD, continue monitoring urine output, I&OS, BUN/Cr   Euvolemic, even fluid balance, currently off diuretics.    Replete lytes PRN. Keep K> 4 and Mg >2  C/w Aldactone for diuresis    spironolactone 12.5 milliGRAM(s) Oral every 12 hours    ==================== GASTROINTESTINAL===================  TFs held in setting of ongoing nausea / recent emesis   CT A/P 10/5 neg for intra-abd abcess, severe stenosis of prox SMA and b/l iliac arteries  CTA A/P 8/13: Evaluation of the mesenteric vessels is limited by streak artifact from LVAD. There appears to be severe stenosis of the proximal SMA; abdominal mesenteric doppler is recommended for further evaluation. 2.  No small bowel findings to suggest acute mesenteric ischemia. 3.  Focal dissections involving the right and left common iliac arteries.  Stenosis of proximal SMA, s/p failed SMA stent c/b RP bleed on 9/10 - vascular to discuss potential re-op via subclavian approach with patient. If patient refuses will have to further discuss GOC / palliative care.   Reglan for gut motility  PRN Simethicone for gas    dextrose 5% + sodium chloride 0.45%. 1000 milliLiter(s) (30 mL/Hr) IV Continuous <Continuous>  multivitamin 1 Tablet(s) Oral daily  GI prophylaxis, pantoprazole  Injectable 40 milliGRAM(s) IV Push every 12 hours  simethicone 80 milliGRAM(s) Chew every 8 hours PRN Gas  sodium chloride 0.9%. 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       Per endocrine      - c/w Methimazole, adjust based on labs        - Check Free T4 and total T3       - c/w Prednisone     insulin lispro (ADMELOG) corrective regimen sliding scale   SubCutaneous every 6 hours  methimazole 20 milliGRAM(s) Oral every 8 hours  predniSONE   Tablet 40 milliGRAM(s) Oral daily    ========================INFECTIOUS DISEASE================  Afebrile, WBC downtrending   Continue trending WBC and monitoring fever curve   CT C/A/P 9/26: progressive ISABELLE opacities and L consolidations, multifocal pneumonia  BCx 9/30 + Serratia marcescens, BCx 1/2 on 10/1 +Enterobacter cloacae complex, c/w Meropenem - to be completed 10/15   BCx 10/5 + 10/5 NGTD   Vancomycin solution for C.diff prophylaxis    meropenem  IVPB 1000 milliGRAM(s) IV Intermittent every 8 hours  vancomycin    Solution 125 milliGRAM(s) Oral every 12 hours      Patient requires continuous monitoring with bedside rhythm monitoring, pulse ox monitoring, and intermittent blood gas analysis. Care plan discussed with ICU care team. Patient remained critical and at risk for life threatening decompensation.     By signing my name below, I, Agnieszka Cherry, attest that this documentation has been prepared under the direction and in the presence of CLAUDIA Bey   Electronically signed: Cesia Shaffer, 10-11-21 @ 08:09    I, Luciano Ayala, personally performed the services described in this documentation. all medical record entries made by the luisibmel were at my direction and in my presence. I have reviewed the chart and agree that the record reflects my personal performance and is accurate and complete  Electronically signed: CLAUDIA Bey        RICKY HAMPTON  MRN-[id number]  Patient is a 66y old  Male who presents with a chief complaint of Anemia, Supratherapeutic INR, Dark Stools (11 Oct 2021 07:45)    HPI:  65M PMH ACC/AHA stage D HF due to NICM HM2 LVAD , TV annuloplasty ring 9/12/17 as destination therapy due to severe peripheral artery disease with significant stenosis  SIADH, Depression, CKD-3 with hyperkalemia, past E. coli UTIs, driveline drainage (1/7/21) and COVID-19 (back in April 2020)  He was recently seen in clinic where he complained of abdominal pain and dark stools w constipation back in May. He presents to [facility] ER today weakness and fatigue, moderate and + Black stools for three days, on coumadin secondary to warfarin use in the setting of an LVAD. Patient has required transfusions for GIB in the past. Mostly recently back in jan 2021 pt had anemia with dark stools. No interventions was done at that time. However Last Endoscopy was done in July 2020 (negative). Today labs show patient is anemic with H/H of 4.5/16.3,. INR is 8.84 MAP in the 90s, Temp 35.1. He denies any chest pain, shortness of breath, dizziness, abd pain, nausea or vomiting.       (14 Jun 2021 16:57)      Surgery/Hospital Course:  6/14 admit for melena w/ anemia, INR 8.84   6/15 Capsul study (+) for small bowel bleed, balloon endoscopy (old blood in prox ileum); post EGD - septic w/ L opacity, re-intubated for concern for aspiration, TTE (Mod MR, decrease biV w/ interventricular septum boweing towards R)  6/17 bronch   6/20 +C Diff   6/22 CT C/A/P: Fluid filled colon which may be 2/2 rapid transit. Small bilateral pleffs with associates. Compressive atelectasis New ISABELLE & LLL  parenchymal opacities, suspicious for pneumonia. Moderate stenosis in the proximal superior mesenteric artery.   6/25 #8 Shiley trach at bedside   7/1 LVAD speed increased to 9200  7/2 Bronch  7/20 TC since 7/7. Patient transferred to SDU.   7/23 INR today 2.64.  H&H 7.3/24 this AM.  Will repeat CBC at noon, and will send stool guaiac Patient with persistent abdominal tenderness, rate of tube feeds decreased.  No nausea/vomiting.    7/24 INR today 2.4. H&H 9.1/28.6 low flow overnight /N&V, refusing Tube feeds on D5 1/2 normal  @50 cc/hr  7/25 INR 2.69  H&H 7.7/25.1 refusing Tube feeds on D5 1/2 normal  @50 cc/hr. This am + BM Melena Dr Oneill HF  aware- PRBC x1  GI team consulted -  NPO  plan on study in am-  D/w Dr Cadet Patient  to return to CTU for further management; 1PRBCS   7/27 Post op INR 2.2 today.  No bleeding. BC + for SM.  Pt is hypotensive requiring pressor and inotropic support.  ID follow up today on Cefepime will follow.  7/26 R PTC for PTX   7/28 CT C/A/P: sub q emphysema in R chest wall, GGO RUL, small ascites CTH negative; Abd US: GB thickening, pericholecystic fluid    7/31 Perchole drain in place continues to drain total output overnight 133.  Fever today 38.8   8/1 duplex LE negative   8/7 Patient with persistent abdominal pain, refusing tube feeds and medications, Psych consulted  8/13 CTA A/P ordered to r/o mesenteric ischemia 2/2 persistent anorexia, nausea, vomiting. Revealed:  Evaluation of the mesenteric vessels is limited by streak artifact from LVAD. There appears to be severe stenosis of the proximal SMA; abdominal mesenteric doppler is recommended for further evaluation. 2.  No small bowel findings to suggest acute mesenteric ischemia. 3.  Focal dissections involving the right and left common iliac arteries.  8/15: Cultures resulted BC 1/2 +GPC in clusters, SC enterobacter; mesenteric duplex: borderline stenosis of proximal SMA  8/27: CT C/A/P noncon: Nondular opacities in R lung apex w/cavitation, abd nl  8/31:  Continue current care, treatment of thyrotoxicosis with medications as per endocrine, d/c ABX as per team.   9/8 RUQ sono: Contracted gallbladder with cholecystostomy tube in place.  9/10 failed SMA stent, c/b RP bleed s/p 3U PRCB  9/12 CTA Abd/Pelvis L RP hematoma   9/24 Trach decannulated   9/26 intubated fro resp distress for increased WOB, LL pneumonia; CT C/A/P: progressive ISABELLE opacities and L consolidations, multifocal pneumonia   9/30 TTE (EF 25%, decreased RV, mod MR)   10/3 VT -> Lidocaine gtt   10/4 Trach size 6 DCT cuff  10/5 CT abd (neg for intra-abd abcess, severe stenosis of prox SMA and b/l iliac arteries)    Today:  Patient refusing meds this AM, ENT to return for removal of sutures at a later time. TFs held for ongoing nausea / recent emesis. Stenosis of proximal SMA, s/p failed SMA stent c/b RP bleed on 9/10 - vascular to discuss potential re-op via subclavian approach with patient. If patient refuses will have to further discuss GOC/palliative care.     REVIEW OF SYSTEMS:  Unable to obtain, limited secondary to pts trach     ICU Vital Signs Last 24 Hrs  T(C): 37 (11 Oct 2021 00:00), Max: 37 (11 Oct 2021 00:00)  T(F): 98.6 (11 Oct 2021 00:00), Max: 98.6 (11 Oct 2021 00:00)  HR: 91 (11 Oct 2021 06:00) (89 - 127)  BP: --  BP(mean): --  ABP: 100/80 (11 Oct 2021 06:00) (90/77 - 151/145)  ABP(mean): 90 (11 Oct 2021 06:00) (82 - 200)  RR: 12 (11 Oct 2021 06:00) (10 - 36)  SpO2: 100% (11 Oct 2021 06:00) (94% - 100%)      Physical Exam:  Gen:  NAD, limited engagement to conversation  CNS: moves all extremities to command   Neck: no JVD, +trach   RES : coarse breath sounds, no wheezing    CVS: +LVAD hum   Abd: Soft. Sybil drain with bilious fluid. Positive BS throughout. Mild RUQ abdominal tenderness by perc sybil.  Skin: No rash, erythema, cyanosis.  Vasc: Warm and well-perfused.  Ext:  no edema    ============================I/O===========================   I&O's Detail    10 Oct 2021 07:01  -  11 Oct 2021 07:00  --------------------------------------------------------  IN:    dextrose 5% + sodium chloride 0.45%: 690 mL    IV PiggyBack: 575 mL    sodium chloride 0.9%: 230 mL  Total IN: 1495 mL    OUT:    Drain (mL): 150 mL    Indwelling Catheter - Urethral (mL): 30 mL    Voided (mL): 550 mL  Total OUT: 730 mL    Total NET: 765 mL    ============================ LABS =========================                        11.5   13.30 )-----------( 264      ( 11 Oct 2021 00:25 )             34.4     10-11    134<L>  |  96  |  13  ----------------------------<  78  3.1<L>   |  22  |  0.40<L>    Ca    10.0      11 Oct 2021 00:24  Phos  1.6     10-11  Mg     1.5     10-11    TPro  7.7  /  Alb  4.0  /  TBili  1.4<H>  /  DBili  x   /  AST  19  /  ALT  19  /  AlkPhos  147<H>  10-11    LIVER FUNCTIONS - ( 11 Oct 2021 00:24 )  Alb: 4.0 g/dL / Pro: 7.7 g/dL / ALK PHOS: 147 U/L / ALT: 19 U/L / AST: 19 U/L / GGT: x             ABG - ( 11 Oct 2021 00:14 )  pH, Arterial: 7.52  pH, Blood: x     /  pCO2: 34    /  pO2: 192   / HCO3: 28    / Base Excess: 5.0   /  SaO2: 100.0     ======================Micro/Rad/Cardio=================  Culture: Reviewed   CXR: Reviewed  Echo:Reviewed  ======================================================  PAST MEDICAL & SURGICAL HISTORY:  CHF (congestive heart failure)    CAD (coronary artery disease)    Depression    Pleural effusion    History of 2019 novel coronavirus disease (COVID-19)  april 2020    Hemorrhoids    Bleeding hemorrhoids    Peripheral arterial disease    Claudication    BPH with urinary obstruction    ACC/AHA stage D systolic heart failure    Anticoagulation goal of INR 2.0 to 2.5    Falls    Clavicle fracture    CKD (chronic kidney disease), stage III    Iron deficiency anemia    H/O epistaxis    Vertigo    GI bleed    S/P TVR (tricuspid valve repair)    S/P ventricular assist device    S/P endoscopy      ====================ASSESSMENT ==============  Stage D Nonischemic Cardiomyopathy, Status Post HM2 on 9/2017    Cardiogenic shock  Hemodynamic instability   Acute hypoxemic respiratory failure s/p trach 6/25, decannulated on 9/24; Reintubated on 9/26   GI bleed , Status Post Enteroscopy 6/14  Anemia, in setting of melena   Chronic Kidney Disease  Stress hyperglycemia   C.diff positive on 6/20   Hypovolemic shock  Septic shock  Leukocytosis  GB thickening/percholecystic s/p perc choley by IT 7/30  SMA stenosis  Serratia/citrobacter pneumonia 7/7  Stenotrophomonas pneumonia 8/1  Enterobacter pneumonia 8/13  Nasuea/vomiting  Deconditioning    Plan:  ====================== NEUROLOGY=====================  Continue close monitoring of neuro status   PRN Fentanyl and PRN Tylenol for analgesia     acetaminophen    Suspension .. 650 milliGRAM(s) Enteral Tube every 6 hours PRN Mild Pain (1 - 3)  fentaNYL    Injectable 12.5 MICROGram(s) IV Push every 3 hours PRN Moderate to severe pain    ==================== RESPIRATORY======================  Acute hypoxemic respiratory failure s/p #8 shiley trach on 6/25; decannulated on 9/24; Reintubated on 9/26; trach size 6 DCT cuff on 10/4 - ENT to return later for suture removal   Continue close monitoring of respiratory rate and breathing pattern, following of ABG’s with A-line monitoring, continuous pulse oximetry monitoring.     Mechanical Ventilation:  Mode: AC/ CMV (Assist Control/ Continuous Mandatory Ventilation)  RR (machine): 12  TV (machine): 500  FiO2: 40  PEEP: 5  ITime: 1  MAP: 11  PIP: 19      ====================CARDIOVASCULAR==================  Stage D Nonischemic Cardiomyopathy, Status Post HM2 on 9/2017; LVAD settings 9200, flow 5.8  TTE 10/4:  Severely decreased LV systolic function, Diffuse hypokinesis. Mild AR. No pericardial effusion   VT on 10/4, s/p Lidocaine drip, continue close tele monitoring   Holding off on Propranolol for now     ===================HEMATOLOGIC/ONC ===================  CTA A/P on 9/12 +L RP hematoma   Acute blood loss anemia, monitor H&H/Plts   Holding coumadin and ASA given recurrent GI bleeding, continue monitoring LDH     ===================== RENAL =========================  Hx of CKD, continue monitoring urine output, I&OS, BUN/Cr   Euvolemic, even fluid balance, currently off diuretics.    Replete lytes PRN. Keep K> 4 and Mg >2  C/w Aldactone for diuresis    spironolactone 12.5 milliGRAM(s) Oral every 12 hours    ==================== GASTROINTESTINAL===================  TFs held in setting of ongoing nausea / recent emesis   CT A/P 10/5 neg for intra-abd abcess, severe stenosis of prox SMA and b/l iliac arteries  CTA A/P 8/13: Evaluation of the mesenteric vessels is limited by streak artifact from LVAD. There appears to be severe stenosis of the proximal SMA; abdominal mesenteric doppler is recommended for further evaluation. 2.  No small bowel findings to suggest acute mesenteric ischemia. 3.  Focal dissections involving the right and left common iliac arteries.  Stenosis of proximal SMA, s/p failed SMA stent c/b RP bleed on 9/10 - vascular to discuss potential re-op via subclavian approach with patient. If patient refuses will have to further discuss GOC / palliative care.   Reglan for gut motility  PRN Simethicone for gas    dextrose 5% + sodium chloride 0.45%. 1000 milliLiter(s) (30 mL/Hr) IV Continuous <Continuous>  multivitamin 1 Tablet(s) Oral daily  GI prophylaxis, pantoprazole  Injectable 40 milliGRAM(s) IV Push every 12 hours  simethicone 80 milliGRAM(s) Chew every 8 hours PRN Gas  sodium chloride 0.9%. 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       Per endocrine      - c/w Methimazole, adjust based on labs        - Check Free T4 and total T3       - c/w Prednisone     insulin lispro (ADMELOG) corrective regimen sliding scale   SubCutaneous every 6 hours  methimazole 20 milliGRAM(s) Oral every 8 hours  predniSONE   Tablet 40 milliGRAM(s) Oral daily    ========================INFECTIOUS DISEASE================  Afebrile, WBC downtrending   Continue trending WBC and monitoring fever curve   CT C/A/P 9/26: progressive ISABELLE opacities and L consolidations, multifocal pneumonia  BCx 9/30 + Serratia marcescens, BCx 1/2 on 10/1 +Enterobacter cloacae complex, c/w Meropenem - to be completed 10/15   BCx 10/5 + 10/5 NGTD   Vancomycin solution for C.diff prophylaxis    meropenem  IVPB 1000 milliGRAM(s) IV Intermittent every 8 hours  vancomycin    Solution 125 milliGRAM(s) Oral every 12 hours      Patient requires continuous monitoring with bedside rhythm monitoring, pulse ox monitoring, and intermittent blood gas analysis. Care plan discussed with ICU care team. Patient remained critical and at risk for life threatening decompensation.     By signing my name below, I, Agnieszka Cherry, attest that this documentation has been prepared under the direction and in the presence of CLAUDIA Bey   Electronically signed: Romel Shaffer, 10-11-21 @ 08:09    I, Luciano Ayala, personally performed the services described in this documentation. all medical record entries made by the romel were at my direction and in my presence. I have reviewed the chart and agree that the record reflects my personal performance and is accurate and complete  Electronically signed: CLAUDIA Bey

## 2021-10-11 NOTE — PROGRESS NOTE ADULT - ASSESSMENT
65 YO M with a history of stage D NICM s/p HM2 on 9/2017 as DT (due to severe PAD) with TV ring, prior COVID-19 infection 4/2020, recurrent syncope post LVAD s/p ILR, and chronic abdominal pain with prior negative workup who was admitted 6/14/21 with symptomatic anemia with Hgb 4.5 in setting of INR 8.8 without hemodynamic instability and has since had a protracted hospitalization. He was transfused and underwent VCE which showed active bleeding in the mid small bowel but subsequent enteroscopy 6/15 did not reveal any active bleeding. He acutely decompensated after procedure with fever/hypertension, low flow alarms, and pulmonary infiltrate with hypoxia requiring intubation from probable aspiration PNA. He was unable to extubated and has since undergone tracheostomy but tolerating persistent trach collar and nearing ability for decannulation. 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 He underwent angiogram on 9/10, stent was unable to be placed and course was then complicated by RP bleed. He continued to have persistent leukocytosis and febrile episodes and was noted to have positive sputum culture for serratia marcescens and completed his course of abx. He was initially decannulated on 9/23. Recently he started having multiples of melena, emesis and went into acte respiratory distress and was ultimately re-intubated on 9/26.     He had blood cultures are positive for enterobacter cloacae/serratia, remains on IV antibiotics. Most recent cultures from 10/8 NGTD. Pressors have been weaned off and MAPs are at goal (70-80). He is s/p trach with ENT on 10/4. Overall prognosis remains poor. Ethics and palliative care following. Will need to discuss GOC with patient. Further discussion to be held on Monday 10/11.  67 YO M with a history of stage D NICM s/p HM2 on 9/2017 as DT (due to severe PAD) with TV ring, prior COVID-19 infection 4/2020, recurrent syncope post LVAD s/p ILR, and chronic abdominal pain with prior negative workup who was admitted 6/14/21 with symptomatic anemia with Hgb 4.5 in setting of INR 8.8 without hemodynamic instability and has since had a protracted hospitalization. He was transfused and underwent VCE which showed active bleeding in the mid small bowel but subsequent enteroscopy 6/15 did not reveal any active bleeding. He acutely decompensated after procedure with fever/hypertension, low flow alarms, and pulmonary infiltrate with hypoxia requiring intubation from probable aspiration PNA. He was unable to extubated and has since undergone tracheostomy but tolerating persistent trach collar and nearing ability for decannulation. 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 He underwent angiogram on 9/10, stent was unable to be placed and course was then complicated by RP bleed. He continued to have persistent leukocytosis and febrile episodes and was noted to have positive sputum culture for serratia marcescens and completed his course of abx. He was initially decannulated on 9/23. Recently he started having multiples of melena, emesis and went into acte respiratory distress and was ultimately re-intubated on 9/26.     He had blood cultures are positive for enterobacter cloacae/serratia, remains on IV antibiotics. Most recent cultures from 10/8 NGTD. Pressors have been weaned off and MAPs are at goal (70-80). He is s/p trach with ENT on 10/4. Overall prognosis remains poor. Ethics and palliative care following. Will need to discuss GOC with patient.

## 2021-10-11 NOTE — PROGRESS NOTE ADULT - SUBJECTIVE AND OBJECTIVE BOX
INFECTIOUS DISEASES FOLLOW UP-- Anitra Prater  [phone]    This is a follow up note for this  66yMale with  Anemia    Acute cholecystitis        ROS:  CONSTITUTIONAL:  No fever, good appetite  CARDIOVASCULAR:  No chest pain or palpitations  RESPIRATORY:  No dyspnea  GASTROINTESTINAL:  No nausea, vomiting, diarrhea, or abdominal pain  GENITOURINARY:  No dysuria  NEUROLOGIC:  No headache,     Allergies    Amiodarone Hydrochloride (Other (Severe))    Intolerances        ANTIBIOTICS/RELEVANT:  antimicrobials  meropenem  IVPB 1000 milliGRAM(s) IV Intermittent every 8 hours  vancomycin    Solution 125 milliGRAM(s) Oral every 12 hours    immunologic:    OTHER:  acetaminophen    Suspension .. 650 milliGRAM(s) Enteral Tube every 6 hours PRN  chlorhexidine 0.12% Liquid 15 milliLiter(s) Oral Mucosa two times a day  chlorhexidine 2% Cloths 1 Application(s) Topical <User Schedule>  dextrose 5% + sodium chloride 0.45%. 1000 milliLiter(s) IV Continuous <Continuous>  fentaNYL    Injectable 12.5 MICROGram(s) IV Push every 3 hours PRN  insulin lispro (ADMELOG) corrective regimen sliding scale   SubCutaneous every 6 hours  methimazole 20 milliGRAM(s) Oral every 8 hours  metoclopramide Injectable 10 milliGRAM(s) IV Push every 8 hours  multivitamin 1 Tablet(s) Oral daily  pantoprazole  Injectable 40 milliGRAM(s) IV Push every 12 hours  predniSONE   Tablet 40 milliGRAM(s) Oral daily  simethicone 80 milliGRAM(s) Chew every 8 hours PRN  sodium chloride 0.9%. 1000 milliLiter(s) IV Continuous <Continuous>  spironolactone 12.5 milliGRAM(s) Oral every 12 hours  thiamine 100 milliGRAM(s) Oral daily      Objective:  Vital Signs Last 24 Hrs  T(C): 37.3 (11 Oct 2021 15:00), Max: 37.3 (11 Oct 2021 15:00)  T(F): 99.1 (11 Oct 2021 15:00), Max: 99.1 (11 Oct 2021 15:00)  HR: 95 (11 Oct 2021 19:00) (89 - 127)  BP: --  BP(mean): --  RR: 16 (11 Oct 2021 19:00) (10 - 36)  SpO2: 100% (11 Oct 2021 19:00) (94% - 100%)    PHYSICAL EXAM:  Constitutional:no acute distress  Eyes:ALONSO, EOMI  Ear/Nose/Throat: no oral lesions, 	  Respiratory: clear BL  Cardiovascular: S1S2  Gastrointestinal:soft, (+) BS, no tenderness  Extremities:no e/e/c  No Lymphadenopathy  IV sites not inflammed.    LABS:                        11.5   13.30 )-----------( 264      ( 11 Oct 2021 00:25 )             34.4     10-11    134<L>  |  96  |  13  ----------------------------<  78  3.1<L>   |  22  |  0.40<L>    Ca    10.0      11 Oct 2021 00:24  Phos  1.6     10-11  Mg     1.5     10-11    TPro  7.7  /  Alb  4.0  /  TBili  1.4<H>  /  DBili  x   /  AST  19  /  ALT  19  /  AlkPhos  147<H>  10-11          MICROBIOLOGY:            RECENT CULTURES:  10-07 @ 14:09  .Blood Blood-Peripheral  --  --  --    No growth to date.  --  10-05 @ 14:55  .Blood Blood-Peripheral  --  --  --    No Growth Final  --      RADIOLOGY & ADDITIONAL STUDIES: INFECTIOUS DISEASES FOLLOW UP-- Anitra Prater  [phone]    This is a follow up note for this  66yMale with  Anemia    Acute cholecystitis        ROS:  CONSTITUTIONAL:  No fever, good appetite  CARDIOVASCULAR:  No chest pain or palpitations  RESPIRATORY:  No dyspnea  GASTROINTESTINAL:  No nausea, vomiting, diarrhea, or abdominal pain  GENITOURINARY:  No dysuria  NEUROLOGIC:  No headache,     Allergies    Amiodarone Hydrochloride (Other (Severe))    Intolerances        ANTIBIOTICS/RELEVANT:  antimicrobials  meropenem  IVPB 1000 milliGRAM(s) IV Intermittent every 8 hours  vancomycin    Solution 125 milliGRAM(s) Oral every 12 hours    immunologic:    OTHER:  acetaminophen    Suspension .. 650 milliGRAM(s) Enteral Tube every 6 hours PRN  chlorhexidine 0.12% Liquid 15 milliLiter(s) Oral Mucosa two times a day  chlorhexidine 2% Cloths 1 Application(s) Topical <User Schedule>  dextrose 5% + sodium chloride 0.45%. 1000 milliLiter(s) IV Continuous <Continuous>  fentaNYL    Injectable 12.5 MICROGram(s) IV Push every 3 hours PRN  insulin lispro (ADMELOG) corrective regimen sliding scale   SubCutaneous every 6 hours  methimazole 20 milliGRAM(s) Oral every 8 hours  metoclopramide Injectable 10 milliGRAM(s) IV Push every 8 hours  multivitamin 1 Tablet(s) Oral daily  pantoprazole  Injectable 40 milliGRAM(s) IV Push every 12 hours  predniSONE   Tablet 40 milliGRAM(s) Oral daily  simethicone 80 milliGRAM(s) Chew every 8 hours PRN  sodium chloride 0.9%. 1000 milliLiter(s) IV Continuous <Continuous>  spironolactone 12.5 milliGRAM(s) Oral every 12 hours  thiamine 100 milliGRAM(s) Oral daily      Objective:  Vital Signs Last 24 Hrs  T(C): 37.3 (11 Oct 2021 15:00), Max: 37.3 (11 Oct 2021 15:00)  T(F): 99.1 (11 Oct 2021 15:00), Max: 99.1 (11 Oct 2021 15:00)  HR: 95 (11 Oct 2021 19:00) (89 - 127)  BP: --  BP(mean): --  RR: 16 (11 Oct 2021 19:00) (10 - 36)  SpO2: 100% (11 Oct 2021 19:00) (94% - 100%)    PHYSICAL EXAM:  Constitutional:no acute distress but does not wish to be bothered  Eyes:ALONSO, EOMI  Ear/Nose/Throat: no oral lesions, 	  Respiratory: clear BL  Cardiovascular: [id number] sounds  Gastrointestinal:thin tender, cholecystotomy tube  Extremities:no e/e/c  No Lymphadenopathy  IV sites not inflammed.    LABS:                        11.5   13.30 )-----------( 264      ( 11 Oct 2021 00:25 )             34.4     10-11    134<L>  |  96  |  13  ----------------------------<  78  3.1<L>   |  22  |  0.40<L>    Ca    10.0      11 Oct 2021 00:24  Phos  1.6     10-11  Mg     1.5     10-11    TPro  7.7  /  Alb  4.0  /  TBili  1.4<H>  /  DBili  x   /  AST  19  /  ALT  19  /  AlkPhos  147<H>  10-11          MICROBIOLOGY:            RECENT CULTURES:  10-07 @ 14:09  .Blood Blood-Peripheral  --  --  --    No growth to date.  --  10-05 @ 14:55  .Blood Blood-Peripheral  --  --  --    No Growth Final  --      RADIOLOGY & ADDITIONAL STUDIES:    < from: Xray Chest 1 View- PORTABLE-Urgent (Xray Chest 1 View- PORTABLE-Urgent .) (10.11.21 @ 02:44) >    IMPRESSION:  Clear lungs.    < end of copied text >

## 2021-10-11 NOTE — PROGRESS NOTE ADULT - PROBLEM SELECTOR PLAN 1
- ACC/AHA stage D systolic heart failure s/p LVAD   - stable without evidence of device malfunction  - MAP goal 70, currently off pressors  - c/w Aldactone 12.5 mg PO BID

## 2021-10-11 NOTE — PROGRESS NOTE ADULT - ASSESSMENT
Assessment and Recommendation:   · Assessment	  Assessment and recommendation :  Recurrent Acute hypoxic respiratory Failure reintubated on full ventilator support FI02 is 40% S/P tracheostomy    Acute blood loss anemia S/P multiple  blood transfusion post tracheostomy   recurrent  septic shock  weaned off  vasopressin   hemorrhagic shock receiving 2 unit of blood transfusion   pan culture negative on meropenem  , po vancomycin   S/P cholecystostomy tube placement by IR    AF RVR back to regular sinus Rhythm   Non ischemic cardiomyopathy continue ACE inhibitor and B-Blockers   mesenteric ischemia failure to stent SMA , no plan for repeated trial   S/P 3 unit of blood transfusion   INR is very high repeat , no evidence of bleeding   Stage D systolic heart failure S/P LVAD HM2   MH2 LVAD  with  TV Annuloplasty  Severe peripheral vascular disease   severe hyperglycemia on insulin coverage    Reglan 10 mg for Gastric Motility   hyperthyroidism on Methimazole 10mg TID   critical care polyneuropathy   Anemia of Acute blood Loss   severe protein caloric malnutrition   magnesium supplement keep above 2   potasium supplement   Chronic kidney disease stage III  Depressed and withdrawn   resume NG tube feeding   GI prophylaxis with PPI   weaning trial ?  Discussed with TCV team   critical care time is 35 minutes

## 2021-10-11 NOTE — CHART NOTE - NSCHARTNOTEFT_GEN_A_CORE
Vascular was re-consulted for potential SMA stenosis intervention. On 10/8, vascular spoke to patient with creole  during a goals of care meeting, and the patient had denied intervention. Goals of care as well as palliative decision making remain unclear. Please re-consult as needed once clarified and if patient may be amenable to intervention      Vascular  [id number]

## 2021-10-11 NOTE — PROGRESS NOTE ADULT - PROBLEM SELECTOR PLAN 6
Following for GOC given complex hospital course.  - Plan for GOC meeting tomorrow with heart failure and vascular surgery Following for GOC given complex hospital course.  - Awaiting multidisciplinary team meeting. attempted to contact vascular surgery without success.  -Palliative availability is 10am-3:30pm, discussed with CTU team.

## 2021-10-11 NOTE — PROGRESS NOTE ADULT - SUBJECTIVE AND OBJECTIVE BOX
RICKY JOINT  MRN#: [id number]  Subjective:  Pulmonary progress  : recurrent Acute hypoxic respiratory Failure ,aspiration pneumonia, NICM  ,   65M PMH ACC/AHA stage D HF due to NICM HM2 LVAD , TV annuloplasty ring 17 as destination therapy due to severe peripheral artery disease with significant stenosis  SIADH, Depression, CKD-3 with hyperkalemia, past E. coli UTIs, driveline drainage (21) and COVID-19 (back in 2020)  He was recently seen in clinic where he complained of abdominal pain and dark stools w constipation back in May. He presents to [facility] ER today weakness and fatigue, moderate and + Black stools for three days, on coumadin secondary to warfarin use in the setting of an LVAD. Patient has required transfusions for GIB in the past. Mostly recently back in 2021 pt had anemia with dark stools. No interventions was done at that time. However Last Endoscopy was done in 2020 (negative). Today labs show patient is anemic with H/H of 4.5/16.3,. INR is 8.84 MAP in the 90s, Temp 35.1. He denies any chest pain, shortness of breath, dizziness, abd pain, nausea or vomiting. found to have  rectal bleeding underwent endoscopy ,old blood in the proximal ileum ,  develop sepsis with LL opacity given Antibiotics , Extubated , reintubated , Bronchoscopy on Zosyn for LL pneumonia  and Amiodarone S/P TV Annuloplasty , patient remain intubated on full ventilatory support .S/P multiple units of blood transfusion , remain on full ventilatory support on Precedex and propofol , new central IJ line , diarrhea C diff. +ve on po vancomycin and IV Flagyl,  mildly distended belly , fever start on cefepime 2gm q 8 hrs S/P tracheostomy .  new RT Subclavian central line continue on contact  isolation ,S/P  C-diff antibiotics, no more diarrhea back on full support mechanical ventilator , chest x ray show improvement in LLL air space disease, more awake and responsive on tube feeding no more diarrhea ,  no nausea or vomiting or diarrhea still very weak and tired , back on tube feeding ,still on po vancomycin , getting PT and OT at bed side ,   , no SOB getting stronger , improve muscle tone patient transfer to monitor bed still on contact isolation for C-Difficel colitis on 50% FI02, and change to Suresh  tube feeding still loose stool . H/H drop significantly require blood transfusion , most likely GI bleeding , IV heparin D/C ,  H/H is stable ., patient develop TR sided  pneumothorax require chest tube placement , RT IJ central line  placed , develop fever shaking chills , blood culture positive for serratia marcescens , start on cefepime .the patient  become hypoxic and hypotensive placed on full ventilatory support and Vasopressin , levophed and Dobutamine ,S/P blood transfusion on meropenem and vancomycin ,   , on and  off pressors , occasional agitation on Precedex .S/P IR cholecystostomy tube drainage placement in the RT upper Quadrant , resume anticoagulation chest x ray noted C-PAP trail lasted only for 2 hrs , new RT SC line and D/C RT IJ line , RT pig tail cathter has been removed , tolerating C-PAP trial placed on trach. collar 50% FI02 GI consultant noted on NG tube feeding as tolerated , develop AF RVR S/P  bolus Amiodarone  back to regular sinus Rhythm , Flat Affect depressed , back on tube feeding Vital AF at 60 cc/ hr .still intermittent abdominal pain , no fever saturation is accepted  back on full ventilatory support ,   on methimazole for hyperthyroidism ,  condition is the same ,still C/O Abdominal pain , white count is improving , no chest pain or SOB ,  .placed on Reglan 10 mg TID for gastric motility , depressed , withdrawn. , S/P  mesenteric angiogram , unable to stent SMA S/P 3 units of blood transfusion   RT IJ in place reassessed for stent in the SMA by vascular,  dark stool stable H/H ,surgical opinion for possible Lap cholecystectomy. over night events noted develop respiratory distress, , rectal bleeding, require intubation placed on full ventilatory support , FI02 is 50% also became hemianosmically unstable require triple pressor support with levophed , vasopressin and norsynephrine, pan culture placed  on Vancomycin IV and PO  and Zosyn, sedated with propofol kept NPO , new central line RT and left IJ cathter placed . Diflucan Added .fever of 38.5 weaned off  vasopressin , all culture are negative, resume tube feeding ,  white count is improving , on meropenem, vancomycin solution  , on Precedex, no over night events , S/P  tracheostomy , bleeding receiving blood transfusion on vasopressin , no more bleeding , no fever , more awake and responsive ,tolerating tube feeding , ID and heart failure follow up appreciated , depresses no weaning for now , magnesium is low to give supplement too keep Above 2 , potassium is low to give supplement      (2021 16:57)    PAST MEDICAL & SURGICAL HISTORY:  CHF (congestive heart failure)    CAD (coronary artery disease)  Depression    Pleural effusion    History of 2019 novel coronavirus disease (COVID-19)  2020    Hemorrhoids    Bleeding hemorrhoids    Peripheral arterial disease    Claudication    BPH with urinary obstruction    ACC/AHA stage D systolic heart failure  Anticoagulation goal of INR 2.0 to 2.5    Falls    Clavicle fracture    CKD (chronic kidney disease), stage III    Iron deficiency anemia    H/O epistaxis    Vertigo    GI bleed    S/P TVR (tricuspid valve repair)    S/P ventricular assist device    S/P endoscopy    OBJECTIVE:  ICU Vital Signs Last 24 Hrs  T(C): 38.2 (2021 10:00), Max: 38.5 (2021 12:00)  T(F): 100.8 (2021 10:00), Max: 101.3 (2021 12:00)  HR: 65 (2021 10:00) (61 - 69)  BP: --  BP(mean): --  ABP: 105/67 (2021 10:00) (90/54 - 113/64)  ABP(mean): 77 (2021 10:00) (63 - 77)  RR: 20 (2021 10:00) (19 - 35)  SpO2: 99% (2021 10:00) (96% - 100%)       @ 07: @ 07:00  --------------------------------------------------------  IN: 2693.9 mL / OUT: 1415 mL / NET: 1278.9 mL     @ 07:01  -   @ 10:49  --------------------------------------------------------  IN: 420.8 mL / OUT: 115 mL / NET: 305.8 mL  PHYSICAL EXAM: Daily   Elderly male intubated on full ventilatory support FI02 is 40% S/P tracheostomy off   vasopressin , Precedex ,  Daily Weight in k.4 (2021 00:00)  HEENT:     + NCAT  + EOMI  - Conjuctival edema   - Icterus   - Thrush   - ETT  + NGT/OGT  Neck:         + FROM  RT IJ , LT IJ  lines JVD  - Nodes - Masses + Mid-line trachea + Tracheostomy  Chest:            normal A-P diameter    Lungs:          + CTA   + Rhonchi    - Rales    - Wheezing + Decreased  LT BS   - Dullness R L  Cardiac:       + S1 + S2    + RRR   - Irregular   - S3  - S4    - Murmurs   - Rub   - Hamman’s sign   Abdomen:    + BS  + Soft + Non-tender - Distended - Organomegaly - PEG .cholecystostomy tube in place  Extremities:   - Cyanosis U/L   - Clubbing  U/L  + LE/UE Edema   + Capillary refill    + Pulses   Neuro:        - Awake   -  Alert   - Confused   - Lethargic   + Sedated  + Generalized Weakness  Skin:        - Rashes    - Erythema   + Normal incisions   + IV sites intact          HOSPITAL MEDICATIONS: All medications reviewed and analyzed  MEDICATIONS  (STANDING):  amiodarone    Tablet 200 milliGRAM(s) Oral daily  chlorhexidine 0.12% Liquid 15 milliLiter(s) Oral Mucosa every 12 hours  chlorhexidine 2% Cloths 1 Application(s) Topical <User Schedule>  dexmedetomidine Infusion 0.5 MICROgram(s)/kG/Hr (9.81 mL/Hr) IV Continuous <Continuous>  dextrose 50% Injectable 50 milliLiter(s) IV Push every 15 minutes  heparin  Infusion 400 Unit(s)/Hr (12.5 mL/Hr) IV Continuous <Continuous>  Hydromorphone  Injectable 0.5 milliGRAM(s) IV Push once  insulin lispro (ADMELOG) corrective regimen sliding scale   SubCutaneous every 6 hours  pantoprazole  Injectable 40 milliGRAM(s) IV Push every 12 hours  piperacillin/tazobactam IVPB.. 3.375 Gram(s) IV Intermittent every 8 hours  propofol Infusion 20 MICROgram(s)/kG/Min (9.42 mL/Hr) IV Continuous <Continuous>  sodium chloride 0.9% lock flush 3 milliLiter(s) IV Push every 8 hours  sodium chloride 0.9%. 1000 milliLiter(s) (10 mL/Hr) IV Continuous <Continuous>    MEDICATIONS  (PRN):  acetaminophen    Suspension .. 650 milliGRAM(s) Oral every 6 hours PRN Temp greater or equal to 38C (100.4F)    LABS: All Lab data reviewed and analyzed                        11.5   13.30 )-----------( 264      ( 11 Oct 2021 00:25 )             34.4    10-11    134<L>  |  96  |  13  ----------------------------<  78  3.1<L>   |  22  |  0.40<L>    Ca    10.0      11 Oct 2021 00:24  Phos  1.6     10-11  Mg     1.5     10-    TPro  7.7  /  Alb  4.0  /  TBili  1.4<H>  /  DBili  x   /  AST  19  /  ALT  19  /  AlkPhos  147<H>  10-    Ca    9.8      10 Oct 2021 01:22  Phos  2.2     10-10  Mg     1.4     10-10    TPro  8.1  /  Alb  4.1  /  TBili  1.1  /  DBili  x   /  AST  19  /  ALT  20  /  AlkPhos  157<H>  10-10    Ca    10.6<H>      09 Oct 2021 00:18  Phos  2.6     10-09  Mg     1.7     10-    TPro  8.6<H>  /  Alb  4.4  /  TBili  1.1  /  DBili  x   /  AST  25  /  ALT  17  /  AlkPhos  166<H>  10-    Ca    10.4      08 Oct 2021 00:43  Phos  2.2     10-08  Mg     1.5     10-    TPro  8.3  /  Alb  4.4  /  TBili  1.2  /  DBili  x   /  AST  17  /  ALT  15  /  AlkPhos  167<H>  10-08                                                                                                                                                                      PTT - ( 2021 04:52 )  PTT:45.2 sec LIVER FUNCTIONS - ( 2021 00:42 )  Alb: 3.4 g/dL / Pro: 6.7 g/dL / ALK PHOS: 213 U/L / ALT: 15 U/L / AST: 24 U/L / GGT: x           RADIOLOGY: - Reviewed and analyzed RT Pig tail cathter  , LVAD HM2, CT scan of abdomen reviewed result noted

## 2021-10-11 NOTE — PROGRESS NOTE ADULT - ATTENDING COMMENTS
66yrs, non ischemic. s/p HM 2 and Tv annuloplasty  as DT (PVD) sept 2017.   COVID admission april 2020.   Recurrent GI bleeds. No source known. Has been off AC during this admission.  Admitted June 14 with anemia and elevated INR.   Endsoscopy unremarkable.  ID issues- multiple aspiration. multiple rounds of abs. multiple positive BC last +ve 10.4 seratia, entrebacter.   Required re trach on 10.4. Now fully ventilated.   Patient diagnosed with SMA stenosis with abdo pain vomitting.   9.10 attempted percutaneous angioplasty resulting in RP bleed. Vascular surgery have recommended repeat attempt from upper extremity, but patient does not wish this. Patient can not be fed adequately due to persistent vomitting.   Pych and palliative care have deemed patient to have capacity.   MDR have raised the possibility that if the patient does not with to undergo the vascular procedure thereby making adequate nutition impossible, that he might be moved to a hospice environment.   meds: meropenem, vanco PO. off pressors. ald 12.5, PPI, pred 40 (thyrotoxicosis) methimazole.   LVAD 9200, 6.0, HR , MAPs 80-90  I/O: +725  10-11  134<L>  |  96  |  13  ----------------------------<  78  3.1<L>   |  22  |  0.40<L>  Ca    10.0      11 Oct 2021 00:24  Phos  1.6     10-11  Mg     1.5     10-11  TPro  7.7  /  Alb  4.0  /  TBili  1.4<H>  /  DBili  x   /  AST  19  /  ALT  19  /  AlkPhos  147<H>  10-11                        11.5   13.30 )-----------( 264      ( 11 Oct 2021 00:25 )             34.4   WBC 13.3, 15.6   Plan as described above. Await ongoing discussions with palliative care and CT surgery.   Zi Werner

## 2021-10-11 NOTE — PROGRESS NOTE ADULT - PROBLEM SELECTOR PLAN 1
Chronic, thought to be 2/2 SMA stenosis based on angio 9/11. Unable to place stent due to retroperitoneal hematoma previously but vascular now open to procedure.  - GOC unable to be establisihed with patient at this time. Planning GOC meeting with HF and vascular.  - C/w Reglan 5mg IV q 6  PRN Chronic, thought to be 2/2 SMA stenosis based on angio 9/11. Unable to place stent due to retroperitoneal hematoma previously but vascular now open to procedure.  - GOC unable to be established with patient at this time- he is reluctant to engage in conversation. Planning GOC meeting with HF and vascular-> will have SW/ CTU team assist.   - C/w Reglan 5mg IV q 6  PRN

## 2021-10-11 NOTE — PROGRESS NOTE ADULT - PROBLEM SELECTOR PLAN 1
-Continue vent per RT/CTU  - Suction prn   - Continue with trach site care, keep dry and clean   -Reconsult ent as needed -Continue vent per RT/CTU  - Suction prn   - Continue with trach site care, keep dry and clean   - pt combative and refusing suture removal.  will try again tomorrow and see if cooperative

## 2021-10-11 NOTE — PROGRESS NOTE ADULT - SUBJECTIVE AND OBJECTIVE BOX
ENT ISSUE/POD: POD #7 for trach     HPI: 65 y/o male POD #7 for tracheostomy #6 DCT cuffed in place 2/2 respiratory failure. Pt is doing well on the vent. no reported issues.       PAST MEDICAL & SURGICAL HISTORY:  CHF (congestive heart failure)    CAD (coronary artery disease)    Depression    Pleural effusion    History of 2019 novel coronavirus disease (COVID-19)  april 2020    Hemorrhoids    Bleeding hemorrhoids    Peripheral arterial disease    Claudication    BPH with urinary obstruction    ACC/AHA stage D systolic heart failure    Anticoagulation goal of INR 2.0 to 2.5    Falls    Clavicle fracture    CKD (chronic kidney disease), stage III    Iron deficiency anemia    H/O epistaxis    Vertigo    GI bleed    S/P TVR (tricuspid valve repair)    S/P ventricular assist device    S/P endoscopy      Allergies    Amiodarone Hydrochloride (Other (Severe))    Intolerances      MEDICATIONS  (STANDING):  chlorhexidine 0.12% Liquid 15 milliLiter(s) Oral Mucosa two times a day  chlorhexidine 2% Cloths 1 Application(s) Topical <User Schedule>  dextrose 5% + sodium chloride 0.45%. 1000 milliLiter(s) (30 mL/Hr) IV Continuous <Continuous>  insulin lispro (ADMELOG) corrective regimen sliding scale   SubCutaneous every 6 hours  meropenem  IVPB 1000 milliGRAM(s) IV Intermittent every 8 hours  methimazole 20 milliGRAM(s) Oral every 8 hours  metoclopramide Injectable 10 milliGRAM(s) IV Push every 8 hours  multivitamin 1 Tablet(s) Oral daily  pantoprazole  Injectable 40 milliGRAM(s) IV Push every 12 hours  predniSONE   Tablet 40 milliGRAM(s) Oral daily  sodium chloride 0.9%. 1000 milliLiter(s) (10 mL/Hr) IV Continuous <Continuous>  spironolactone 12.5 milliGRAM(s) Oral every 12 hours  thiamine 100 milliGRAM(s) Oral daily  vancomycin    Solution 125 milliGRAM(s) Oral every 12 hours    MEDICATIONS  (PRN):  acetaminophen    Suspension .. 650 milliGRAM(s) Enteral Tube every 6 hours PRN Mild Pain (1 - 3)  fentaNYL    Injectable 12.5 MICROGram(s) IV Push every 3 hours PRN Moderate to severe pain  simethicone 80 milliGRAM(s) Chew every 8 hours PRN Gas      ROS:   ENT: all negative except as noted in HPI   Pulm: denies SOB, cough, hemoptysis  Neuro: denies numbness/tingling, loss of sensation  Endo: denies heat/cold intolerance, excessive sweating      Vital Signs Last 24 Hrs  T(C): 37 (11 Oct 2021 00:00), Max: 37 (11 Oct 2021 00:00)  T(F): 98.6 (11 Oct 2021 00:00), Max: 98.6 (11 Oct 2021 00:00)  HR: 91 (11 Oct 2021 06:00) (89 - 127)  BP: --  BP(mean): --  RR: 12 (11 Oct 2021 06:00) (10 - 36)  SpO2: 100% (11 Oct 2021 06:00) (94% - 100%)                          11.5   13.30 )-----------( 264      ( 11 Oct 2021 00:25 )             34.4    10-11    134<L>  |  96  |  13  ----------------------------<  78  3.1<L>   |  22  |  0.40<L>    Ca    10.0      11 Oct 2021 00:24  Phos  1.6     10-11  Mg     1.5     10-11    TPro  7.7  /  Alb  4.0  /  TBili  1.4<H>  /  DBili  x   /  AST  19  /  ALT  19  /  AlkPhos  147<H>  10-11       PHYSICAL EXAM:  Gen: NAD, well-developed  Head: Normocephalic, Atraumatic  Face: no edema/erythema/fluctuance  Eyes:  no scleral injection  Nose: Nares bilaterally patent, no discharge  Mouth: No Stridor / Drooling / Trismus.  Mucosa moist, tongue/uvula midline, oropharynx clear  Neck: #6 DCT inflated cuff with minimal serosanguinous oozing from stoma, no active bleeding, sutures x4 and velcro tie in place.  No lymphadenopathy, trachea midline, no masses  Resp: ventilating well  CV: no peripheral edema/cyanosis

## 2021-10-11 NOTE — PROGRESS NOTE ADULT - PROBLEM SELECTOR PLAN 5
- Unclear source, likely enteric  - Prior cholecystitis w/ per dawn drain with bilious output  - Continue meropenem and PO vanc, recs as per ID - Unclear source, likely enteric  - Prior cholecystitis w/ per dawn drain with bilious output  - Continue meropenem and PO vanc, recs as per ID  - Most recent culture 10/7 NGTD

## 2021-10-12 NOTE — PROGRESS NOTE ADULT - ATTENDING COMMENTS
Pt refused to let me examine him today. Patient has received contrast 3 times in the past month which has caused his FT4 to go up so he would benefit from a quicker steroid taper as he is so ill. LFTs are WNL but mildly elevated direct bilirubin. Would check a FT4 in the next week - 10/20 as he would benefit from a dose reduction of MMI as side effects are more likely when a dose of over 20mg is used.  Sally Beatty MD  Endocrinology Attending  Mondays and Tuesdays 9am-6pm: [phone] (pager)  Other days, night and weekend: [phone]

## 2021-10-12 NOTE — CHART NOTE - NSCHARTNOTEFT_GEN_A_CORE
Follow up Ethics note.    I met with heart failure and CTU teams this morning. Palliative care and vascular met with patient last week and explained in detail, in simple terms and with a Creole , regarding the SMA stent procedure and at the end of the conversation Mr. Quispe said he wanted to think about his options. Yesterday the patient expressed to the team that he does not want to go through with the procedure. The team's present concern is how we can best respect the patients autonomy when he is refusing the procedure, which will result in his demise, but also not agreeing to comfort measures/hospice.    I met with Mr. Quispe this morning along with Kristian Bee and Carmencita Morales (Ethics Medical Students). He agreed to them being present. He expressed not wanting the SMA stenting and preferring a palliative route. While he had a hard time actively agreeing to comfort/hospice as this will mean he is dying, he did say he wants to get out of the hospital and was agreeable to an outside hospice facility, if feasible with his LVAD. When asked why he doesn't want the SMA stent he expressed that he has been here for 5 months and did not want to go through any more procedures or complications, and that he wants to get out of the hospital.    At 1:30pm there was an interdisciplinary meeting including Palliative Care, CT surgery, Heart Failure, Ethics, the patient, and his sister and son were on the phone. The meeting took place at the patient's bedside and a Creole video  was utilized.    Initially the patient maintained that he wouldn't want the SMA stent, and that his hesitation was due to his concern for complications he may experience. However, after he was informed that pursuing comfort measures may mean he has in the order of days left to live and will likely pass away in the hospital, versus potentially weeks to maybe even months with the procedure, he had a hard time making this decision on his own and deferred decision making to his sister and HCP Cristina Rudolph. Cristina is concerned of the risks of the procedure as well but would want him to live longer and therefore was agreeable to having the procedure done in order to extend his life. I confirmed with Mr. Quispe whether he was ok with this as this was different than what he had told me in the morning, and he nodded yes.    Mr. Quispe has capacity and is deferring decision making to his sister. This is ethically permissible. Of note, since his sister is the one agreeing to the procedure she would be the one to sign the consent.    I had further conversation with Heart Failure CLAUDIA Bazzi and CT Surgery MD Missy Rahman after the meeting. We discussed that Vascular will see the patient tomorrow and confirm with him that he is still agreeable to his sister making the decision for him, and with the decision that she made. If he assents they will proceed with SMA stenting. If at any point he refuses the procedure then we cannot force him against his will, regardless of capacity, to go through with the procedure. In this case it would be appropriate to pursue a conversation about hospice.    Ethics will continue to follow and remain available for further conversations.    Discussed with Ethicists Jaclyn Green and Matt Duffy.    Adina Schwab  Ethics Fellow  [phone] Follow up Ethics note.    I met with heart failure and CTU teams this morning. Palliative care and vascular met with patient last week and explained in detail, in simple terms and with a Creole , regarding the SMA stent procedure and at the end of the conversation Mr. Quispe said he wanted to think about his options. Yesterday the patient expressed to the team that he does not want to go through with the procedure. The team's present concern is how we can best respect the patients autonomy when he is refusing the procedure, which will result in his demise, but also not agreeing to comfort measures/hospice.    I met with Mr. Quispe this morning along with Kristian Bee and Carmencita Morales (Ethics Medical Students). He agreed to them being present. He expressed not wanting the SMA stenting and preferring a palliative route. While he had a hard time actively agreeing to comfort/hospice as this will mean he is dying, he did say he wants to get out of the hospital and was agreeable to an outside hospice facility, if feasible with his LVAD. When asked why he doesn't want the SMA stent he expressed that he has been here for 5 months and did not want to go through any more procedures or complications, and that he wants to get out of the hospital.    At 1:30pm there was an interdisciplinary meeting including Palliative Care, CT surgery, Heart Failure, Ethics, the patient, and his sister and son were on the phone. The meeting took place at the patient's bedside and a Creole video  was utilized.    Initially the patient maintained that he wouldn't want the SMA stent, and that his hesitation was due to his concern for complications he may experience. However, after he was informed that pursuing comfort measures may mean he has in the order of days left to live and will likely pass away in the hospital, versus potentially weeks to maybe even months with the procedure, he had a hard time making this decision on his own and deferred decision making to his sister and HCP Cristina Rudolph. Cristina is concerned of the risks of the procedure as well but would want him to live longer and therefore was agreeable to having the procedure done in order to extend his life. I confirmed with Mr. Quispe whether he was ok with this as this was different than what he had told me in the morning, and he nodded yes.    Mr. Quispe has capacity and is deferring decision making to his sister. This is ethically permissible. Of note, since his sister is the one agreeing to the procedure she would be the one to sign the consent.    I had further conversation with Heart Failure CLAUDIA Bazzi and CT Surgery MD Missy Rahman after the meeting. We discussed that Vascular Surgery will see the patient tomorrow and confirm with him that he is still agreeable to his sister making the decision for him, and with the decision that she made. If he assents they will proceed with SMA stenting. If at any point he refuses the procedure then we cannot force him against his will, regardless of capacity, to go through with the procedure. In this case it would be appropriate to pursue a conversation about hospice.    Ethics will continue to follow and remain available for further conversations.    Discussed with Ethicists Jaclyn Green and Matt Duffy.    Adina Schwab  Ethics Fellow  [phone]

## 2021-10-12 NOTE — PROGRESS NOTE ADULT - PROBLEM SELECTOR PLAN 6
- On methimazole and prednisone   - Will discuss resuming propranolol with endocrine - On methimazole and prednisone

## 2021-10-12 NOTE — PROGRESS NOTE ADULT - ASSESSMENT
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 coli UTIs, driveline drainage (1/7/21) and COVID-19, here initially for GIB prolonged hospital course, s/p tracheostomy, acalculous cholecystitis s/p perc dawn. Patient has persistent intermittent abdominal pain, and was being evaluated for chronic mesenteric ischemia vs SMA syndrome.  8/13 bld cxs positive. TPN was consulted for Protein Calorie Malnutrition, Prealb 11, prior a1c 5.6, tg 141. Mesenteric duplex showied borderline stenosis of prox SMA,  Pt now s/p Mesenteric angiogram 9/10 unable to place stent. Pt had been tolerating feeds but with intermittent abd pain. SLP eval had recommended NPO status.    -Tube feeds remain on hold given nausea/emesis  -Suspect hyperthyroidism contributing in part to metabolic issues ie. cAMP effects GI motility, hypercalcemia, tachycardia; levels have improved, endo following, as per dw Dr Soliz- recommend checking repeat free t3 level  -Recurrent abdominal pain/vomiting- as above, multifactorial, s/p mesenteric angiogram 9/10 by vascular, unable to pass stent, pt refusing reattempt  -Electrolyte Imbalance Risk- monitor and replete elytes as needed  -Anemia- rec iron supp when feasible  -GOC- pall care input noted, planning for family meeting with team to further delineate goc  -Management as per CTU; will remain available as needed    plan discussed with Dr. Soliz and Dr ANNE Tang, agreeable with above    TPN pager [phone], spectra [phone]  M-F 6A-4P, Weekends and holidays 8A-12P

## 2021-10-12 NOTE — PROGRESS NOTE ADULT - SUBJECTIVE AND OBJECTIVE BOX
RICKY JOINT  MRN#: [id number]  Subjective:  Pulmonary progress  : recurrent Acute hypoxic respiratory Failure ,aspiration pneumonia, NICM  ,   65M PMH ACC/AHA stage D HF due to NICM HM2 LVAD , TV annuloplasty ring 17 as destination therapy due to severe peripheral artery disease with significant stenosis  SIADH, Depression, CKD-3 with hyperkalemia, past E. coli UTIs, driveline drainage (21) and COVID-19 (back in 2020)  He was recently seen in clinic where he complained of abdominal pain and dark stools w constipation back in May. He presents to [facility] ER today weakness and fatigue, moderate and + Black stools for three days, on coumadin secondary to warfarin use in the setting of an LVAD. Patient has required transfusions for GIB in the past. Mostly recently back in 2021 pt had anemia with dark stools. No interventions was done at that time. However Last Endoscopy was done in 2020 (negative). Today labs show patient is anemic with H/H of 4.5/16.3,. INR is 8.84 MAP in the 90s, Temp 35.1. He denies any chest pain, shortness of breath, dizziness, abd pain, nausea or vomiting. found to have  rectal bleeding underwent endoscopy ,old blood in the proximal ileum ,  develop sepsis with LL opacity given Antibiotics , Extubated , reintubated , Bronchoscopy on Zosyn for LL pneumonia  and Amiodarone S/P TV Annuloplasty , patient remain intubated on full ventilatory support .S/P multiple units of blood transfusion , remain on full ventilatory support on Precedex and propofol , new central IJ line , diarrhea C diff. +ve on po vancomycin and IV Flagyl,  mildly distended belly , fever start on cefepime 2gm q 8 hrs S/P tracheostomy .  new RT Subclavian central line continue on contact  isolation ,S/P  C-diff antibiotics, no more diarrhea back on full support mechanical ventilator , chest x ray show improvement in LLL air space disease, more awake and responsive on tube feeding no more diarrhea ,  no nausea or vomiting or diarrhea still very weak and tired , back on tube feeding ,still on po vancomycin , getting PT and OT at bed side ,   , no SOB getting stronger , improve muscle tone patient transfer to monitor bed still on contact isolation for C-Difficel colitis on 50% FI02, and change to Suresh  tube feeding still loose stool . H/H drop significantly require blood transfusion , most likely GI bleeding , IV heparin D/C ,  H/H is stable ., patient develop TR sided  pneumothorax require chest tube placement , RT IJ central line  placed , develop fever shaking chills , blood culture positive for serratia marcescens , start on cefepime .the patient  become hypoxic and hypotensive placed on full ventilatory support and Vasopressin , levophed and Dobutamine ,S/P blood transfusion on meropenem and vancomycin ,   , on and  off pressors , occasional agitation on Precedex .S/P IR cholecystostomy tube drainage placement in the RT upper Quadrant , resume anticoagulation chest x ray noted C-PAP trail lasted only for 2 hrs , new RT SC line and D/C RT IJ line , RT pig tail cathter has been removed , tolerating C-PAP trial placed on trach. collar 50% FI02 GI consultant noted on NG tube feeding as tolerated , develop AF RVR S/P  bolus Amiodarone  back to regular sinus Rhythm , Flat Affect depressed , back on tube feeding Vital AF at 60 cc/ hr .still intermittent abdominal pain , no fever saturation is accepted  back on full ventilatory support ,   on methimazole for hyperthyroidism ,  condition is the same ,still C/O Abdominal pain , white count is improving , no chest pain or SOB ,  .placed on Reglan 10 mg TID for gastric motility , depressed , withdrawn. , S/P  mesenteric angiogram , unable to stent SMA S/P 3 units of blood transfusion   RT IJ in place reassessed for stent in the SMA by vascular,  dark stool stable H/H ,surgical opinion for possible Lap cholecystectomy. over night events noted develop respiratory distress, , rectal bleeding, require intubation placed on full ventilatory support , FI02 is 50% also became hemianosmically unstable require triple pressor support with levophed , vasopressin and norsynephrine, pan culture placed  on Vancomycin IV and PO  and Zosyn, sedated with propofol kept NPO , new central line RT and left IJ cathter placed . Diflucan Added .fever of 38.5 weaned off  vasopressin , all culture are negative, resume tube feeding ,  white count is improving , on meropenem, vancomycin solution  , on Precedex, no over night events , S/P  tracheostomy , bleeding receiving blood transfusion on vasopressin , no more bleeding , no fever , more awake and responsive ,tolerating tube feeding , ID and heart failure follow up appreciated , depresses no weaning for now , magnesium is low to give supplement too keep Above 2 , potassium is low to give supplement      (2021 16:57)    PAST MEDICAL & SURGICAL HISTORY:  CHF (congestive heart failure)    CAD (coronary artery disease)  Depression    Pleural effusion    History of 2019 novel coronavirus disease (COVID-19)  2020    Hemorrhoids    Bleeding hemorrhoids    Peripheral arterial disease    Claudication    BPH with urinary obstruction    ACC/AHA stage D systolic heart failure  Anticoagulation goal of INR 2.0 to 2.5    Falls    Clavicle fracture    CKD (chronic kidney disease), stage III    Iron deficiency anemia    H/O epistaxis    Vertigo    GI bleed    S/P TVR (tricuspid valve repair)    S/P ventricular assist device    S/P endoscopy    OBJECTIVE:  ICU Vital Signs Last 24 Hrs  T(C): 38.2 (2021 10:00), Max: 38.5 (2021 12:00)  T(F): 100.8 (2021 10:00), Max: 101.3 (2021 12:00)  HR: 65 (2021 10:00) (61 - 69)  BP: --  BP(mean): --  ABP: 105/67 (2021 10:00) (90/54 - 113/64)  ABP(mean): 77 (2021 10:00) (63 - 77)  RR: 20 (2021 10:00) (19 - 35)  SpO2: 99% (2021 10:00) (96% - 100%)       @ 07: @ 07:00  --------------------------------------------------------  IN: 2693.9 mL / OUT: 1415 mL / NET: 1278.9 mL     @ 07:01  -   @ 10:49  --------------------------------------------------------  IN: 420.8 mL / OUT: 115 mL / NET: 305.8 mL  PHYSICAL EXAM: Daily   Elderly male intubated on full ventilatory support FI02 is 40% S/P tracheostomy off   vasopressin , Precedex ,  Daily Weight in k.4 (2021 00:00)  HEENT:     + NCAT  + EOMI  - Conjuctival edema   - Icterus   - Thrush   - ETT  + NGT/OGT  Neck:         + FROM  RT IJ , LT IJ  lines JVD  - Nodes - Masses + Mid-line trachea + Tracheostomy  Chest:            normal A-P diameter    Lungs:          + CTA   + Rhonchi    - Rales    - Wheezing + Decreased  LT BS   - Dullness R L  Cardiac:       + S1 + S2    + RRR   - Irregular   - S3  - S4    - Murmurs   - Rub   - Hamman’s sign   Abdomen:    + BS  + Soft + Non-tender - Distended - Organomegaly - PEG .cholecystostomy tube in place  Extremities:   - Cyanosis U/L   - Clubbing  U/L  + LE/UE Edema   + Capillary refill    + Pulses   Neuro:        - Awake   -  Alert   - Confused   - Lethargic   + Sedated  + Generalized Weakness  Skin:        - Rashes    - Erythema   + Normal incisions   + IV sites intact          HOSPITAL MEDICATIONS: All medications reviewed and analyzed  MEDICATIONS  (STANDING):  amiodarone    Tablet 200 milliGRAM(s) Oral daily  chlorhexidine 0.12% Liquid 15 milliLiter(s) Oral Mucosa every 12 hours  chlorhexidine 2% Cloths 1 Application(s) Topical <User Schedule>  dexmedetomidine Infusion 0.5 MICROgram(s)/kG/Hr (9.81 mL/Hr) IV Continuous <Continuous>  dextrose 50% Injectable 50 milliLiter(s) IV Push every 15 minutes  heparin  Infusion 400 Unit(s)/Hr (12.5 mL/Hr) IV Continuous <Continuous>  Hydromorphone  Injectable 0.5 milliGRAM(s) IV Push once  insulin lispro (ADMELOG) corrective regimen sliding scale   SubCutaneous every 6 hours  pantoprazole  Injectable 40 milliGRAM(s) IV Push every 12 hours  piperacillin/tazobactam IVPB.. 3.375 Gram(s) IV Intermittent every 8 hours  propofol Infusion 20 MICROgram(s)/kG/Min (9.42 mL/Hr) IV Continuous <Continuous>  sodium chloride 0.9% lock flush 3 milliLiter(s) IV Push every 8 hours  sodium chloride 0.9%. 1000 milliLiter(s) (10 mL/Hr) IV Continuous <Continuous>    MEDICATIONS  (PRN):  acetaminophen    Suspension .. 650 milliGRAM(s) Oral every 6 hours PRN Temp greater or equal to 38C (100.4F)    LABS: All Lab data reviewed and analyzed                          10.8   13.13 )-----------( 222      ( 12 Oct 2021 00:25 )             33.3    10-    137  |  99  |  8   ----------------------------<  85  3.2<L>   |  25  |  0.39<L>    Ca    9.4      12 Oct 2021 00:25  Phos  2.4     10-12  Mg     1.6     10-12    TPro  7.2  /  Alb  3.6  /  TBili  1.6<H>  /  DBili  x   /  AST  18  /  ALT  18  /  AlkPhos  139<H>  10    Ca    10.0      11 Oct 2021 00:24  Phos  1.6     10-11  Mg     1.5     10-    TPro  7.7  /  Alb  4.0  /  TBili  1.4<H>  /  DBili  x   /  AST  19  /  ALT  19  /  AlkPhos  147<H>  10-    Ca    9.8      10 Oct 2021 01:22  Phos  2.2     10-10  Mg     1.4     10-10    TPro  8.1  /  Alb  4.1  /  TBili  1.1  /  DBili  x   /  AST  19  /  ALT  20  /  AlkPhos  157<H>  10-10    Ca    10.6<H>      09 Oct 2021 00:18  Phos  2.6     10-09  Mg     1.7     10-    TPro  8.6<H>  /  Alb  4.4  /  TBili  1.1  /  DBili  x   /  AST  25  /  ALT  17  /  AlkPhos  166<H>  10-    Ca    10.4      08 Oct 2021 00:43  Phos  2.2     10-08  Mg     1.5     10-    TPro  8.3  /  Alb  4.4  /  TBili  1.2  /  DBili  x   /  AST  17  /  ALT  15  /  AlkPhos  167<H>  10-08                                                                                                                                                                      PTT - ( 2021 04:52 )  PTT:45.2 sec LIVER FUNCTIONS - ( 2021 00:42 )  Alb: 3.4 g/dL / Pro: 6.7 g/dL / ALK PHOS: 213 U/L / ALT: 15 U/L / AST: 24 U/L / GGT: x           RADIOLOGY: - Reviewed and analyzed RT Pig tail cathter  , LVAD HM2, CT scan of abdomen reviewed result noted

## 2021-10-12 NOTE — PROGRESS NOTE ADULT - PROBLEM SELECTOR PLAN 5
- Unclear source, likely enteric  - Prior cholecystitis w/ per dawn drain with bilious output  - Continue meropenem and PO vanc, recs as per ID  - Most recent culture 10/7 NGTD

## 2021-10-12 NOTE — GOALS OF CARE CONVERSATION - ADVANCED CARE PLANNING - CONVERSATION DETAILS
Full note to f/u    ID # [id number]  Met with the patient, HF, CT Sx, Ethics, and the patient's HCP (sister) and son.   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 After a lengthy  d/w the patient and his family, he indicated he wanted his family to make a decisions for him regarding a possible SMA procedure. His HCP and son decided for a Sx approach. Consents are to be gathered from the HCP.         Francisco Burgos MD   Geriatrics and Palliative Care (GAP) Consult Service    of Geriatric and Palliative Medicine  [facility]      Please page the following number for clinical matters between the hours of 9 am and 5 pm from Monday through Friday : [phone]    After 5pm and on weekends, please see the contact information below:    In the event of newly developing, evolving, or worsening symptoms, please contact the Palliative Medicine team via pager (if the patient is at [facility] #[phone] or if the patient is at [facility] #[phone]) The Geriatric and Palliative Medicine service has coverage 24 hours a day/ 7 days a week to provide medical recommendations regarding symptom management needs via telephone  ID # [id number]  Met with the patient, HF, CT Sx, Ethics, and the patient's HCP (sister) and son.   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 After a lengthy  d/w the patient and his family, he indicated he wanted his family to make a decisions for him regarding a possible SMA procedure. His HCP and son decided for a Sx approach. Consents are to be gathered from the HCP.         Francisco Burgos MD   Geriatrics and Palliative Care (GAP) Consult Service    of Geriatric and Palliative Medicine  [facility]      Please page the following number for clinical matters between the hours of 9 am and 5 pm from Monday through Friday : [phone]    After 5pm and on weekends, please see the contact information below:    In the event of newly developing, evolving, or worsening symptoms, please contact the Palliative Medicine team via pager (if the patient is at [facility] #[phone] or if the patient is at [facility] #[phone]) The Geriatric and Palliative Medicine service has coverage 24 hours a day/ 7 days a week to provide medical recommendations regarding symptom management needs via telephone  ID # [id number]  Met with the patient, HF, CT Sx, Ethics, and the patient's HCP (sister) and son.   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 He was able to appropriately confirm or deny question that allowed for evaluating his capacity to make decision regarding this procedure. At the same time his family was able to give verbalized understanding about the possible risks and benefits of a possible SMA intervention. After a lengthy  d/w the patient and his family, he indicated he wanted his family to make a decisions for him regarding a possible SMA procedure. 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 Consents are to be gathered from the HCP.     See Ethics note for further details.     Francisco Burgos MD   Geriatrics and Palliative Care (GAP) Consult Service    of Geriatric and Palliative Medicine  [facility]      Please page the following number for clinical matters between the hours of 9 am and 5 pm from Monday through Friday : [phone]    After 5pm and on weekends, please see the contact information below:    In the event of newly developing, evolving, or worsening symptoms, please contact the Palliative Medicine team via pager (if the patient is at [facility] #[phone] or if the patient is at [facility] #[phone]) The Geriatric and Palliative Medicine service has coverage 24 hours a day/ 7 days a week to provide medical recommendations regarding symptom management needs via telephone

## 2021-10-12 NOTE — PROGRESS NOTE ADULT - SUBJECTIVE AND OBJECTIVE BOX
RICKY HAMPTON  MRN-[id number]  Patient is a 66y old  Male who presents with a chief complaint of Anemia, Supratherapeutic INR, Dark Stools (12 Oct 2021 07:02)    HPI:  65M PMH ACC/AHA stage D HF due to NICM HM2 LVAD , TV annuloplasty ring 17 as destination therapy due to severe peripheral artery disease with significant stenosis  SIADH, Depression, CKD-3 with hyperkalemia, past E. coli UTIs, driveline drainage (21) and COVID-19 (back in 2020)  He was recently seen in clinic where he complained of abdominal pain and dark stools w constipation back in May. He presents to [facility] ER today weakness and fatigue, moderate and + Black stools for three days, on coumadin secondary to warfarin use in the setting of an LVAD. Patient has required transfusions for GIB in the past. Mostly recently back in 2021 pt had anemia with dark stools. No interventions was done at that time. However Last Endoscopy was done in 2020 (negative). Today labs show patient is anemic with H/H of 4.5/16.3,. INR is 8.84 MAP in the 90s, Temp 35.1. He denies any chest pain, shortness of breath, dizziness, abd pain, nausea or vomiting.       (2021 16:57)      Surgery/Hospital Course:   admit for melena w/ anemia, INR 8.84   6/15 Capsul study (+) for small bowel bleed, balloon endoscopy (old blood in prox ileum); post EGD - septic w/ L opacity, re-intubated for concern for aspiration, TTE (Mod MR, decrease biV w/ interventricular septum boweing towards R)   bronch    +C Diff    CT C/A/P: Fluid filled colon which may be 2/2 rapid transit. Small bilateral pleffs with associates. Compressive atelectasis New ISABELLE & LLL  parenchymal opacities, suspicious for pneumonia. Moderate stenosis in the proximal superior mesenteric artery.    #8 Shiley trach at bedside    LVAD speed increased to 9200   Bronch   TC since . Patient transferred to SDU.    INR today 2.64.  H&H 7.3/24 this AM.  Will repeat CBC at noon, and will send stool guaiac Patient with persistent abdominal tenderness, rate of tube feeds decreased.  No nausea/vomiting.     INR today 2.4. H&H 9.1/28.6 low flow overnight /N&V, refusing Tube feeds on D5 1/2 normal  @50 cc/hr   INR 2.69  H&H 7.7/.1 refusing Tube feeds on D5 1/2 normal  @50 cc/hr. This am + BM Melena Dr Oneill HF  aware- PRBC x1  GI team consulted -  NPO  plan on study in am-  D/w Dr Cadet Patient  to return to CTU for further management; 1PRBCS    Post op INR 2.2 today.  No bleeding. BC + for SM.  Pt is hypotensive requiring pressor and inotropic support.  ID follow up today on Cefepime will follow.   R PTC for PTX    CT C/A/P: sub q emphysema in R chest wall, GGO RUL, small ascites CTH negative; Abd US: GB thickening, pericholecystic fluid     Perchole drain in place continues to drain total output overnight 133.  Fever today 38.8    duplex LE negative    Patient with persistent abdominal pain, refusing tube feeds and medications, Psych consulted   CTA A/P ordered to r/o mesenteric ischemia 2/2 persistent anorexia, nausea, vomiting. Revealed:  Evaluation of the mesenteric vessels is limited by streak artifact from LVAD. There appears to be severe stenosis of the proximal SMA; abdominal mesenteric doppler is recommended for further evaluation. 2.  No small bowel findings to suggest acute mesenteric ischemia. 3.  Focal dissections involving the right and left common iliac arteries.  8/15: Cultures resulted BC 1/2 +GPC in clusters, SC enterobacter; mesenteric duplex: borderline stenosis of proximal SMA  : CT C/A/P noncon: Nondular opacities in R lung apex w/cavitation, abd nl  :  Continue current care, treatment of thyrotoxicosis with medications as per endocrine, d/c ABX as per team.    RUQ sono: Contracted gallbladder with cholecystostomy tube in place.  9/10 failed SMA stent, c/b RP bleed s/p 3U PRCB   CTA Abd/Pelvis L RP hematoma    Trach decannulated    intubated fro resp distress for increased WOB, LL pneumonia; CT C/A/P: progressive ISABELLE opacities and L consolidations, multifocal pneumonia    TTE (EF 25%, decreased RV, mod MR)   10/3 VT -> Lidocaine gtt   10/4 Trach size 6 DCT cuff  10/5 CT abd (neg for intra-abd abcess, severe stenosis of prox SMA and b/l iliac arteries)    Today:    REVIEW OF SYSTEMS:  Unable to obtain, limited secondary to pts trach     ICU Vital Signs Last 24 Hrs  T(C): 36.8 (12 Oct 2021 04:00), Max: 37.3 (11 Oct 2021 15:00)  T(F): 98.2 (12 Oct 2021 04:00), Max: 99.2 (11 Oct 2021 20:00)  HR: 91 (12 Oct 2021 06:00) (91 - 124)  BP: --  BP(mean): --  ABP: 95/74 (12 Oct 2021 06:00) (86/74 - 116/91)  ABP(mean): 84 (12 Oct 2021 06:00) (77 - 118)  RR: 13 (12 Oct 2021 06:00) (12 - 27)  SpO2: 100% (12 Oct 2021 06:00) (98% - 100%)      Physical Exam:  Gen:  NAD, limited engagement to conversation  CNS: moves all extremities to command   Neck: no JVD, +trach   RES : coarse breath sounds, no wheezing    CVS: +LVAD hum   Abd: Soft. Sybil drain with bilious fluid. Positive BS throughout. Mild RUQ abdominal tenderness by perc sybil.  Skin: No rash, erythema, cyanosis.  Vasc: Warm and well-perfused.  Ext:  no edema    ============================I/O===========================   I&O's Detail    11 Oct 2021 07:01  -  12 Oct 2021 07:00  --------------------------------------------------------  IN:    dextrose 5% + sodium chloride 0.45%: 930 mL    Enteral Tube Flush: 20 mL    IV PiggyBack: 650 mL    sodium chloride 0.9%: 230 mL  Total IN: 1830 mL    OUT:    Drain (mL): 205 mL    Emesis (mL): 300 mL    Miscellaneous Tube Feedin mL    Voided (mL): 1125 mL  Total OUT: 1630 mL    Total NET: 200 mL        ============================ LABS =========================                        10.8   13.13 )-----------( 222      ( 12 Oct 2021 00:25 )             33.3     10-12    137  |  99  |  8   ----------------------------<  85  3.2<L>   |  25  |  0.39<L>    Ca    9.4      12 Oct 2021 00:25  Phos  2.4     10-12  Mg     1.6     10-12    TPro  7.2  /  Alb  3.6  /  TBili  1.6<H>  /  DBili  x   /  AST  18  /  ALT  18  /  AlkPhos  139<H>  10-12    LIVER FUNCTIONS - ( 12 Oct 2021 00:25 )  Alb: 3.6 g/dL / Pro: 7.2 g/dL / ALK PHOS: 139 U/L / ALT: 18 U/L / AST: 18 U/L / GGT: x             ABG - ( 12 Oct 2021 00:07 )  pH, Arterial: 7.45  pH, Blood: x     /  pCO2: 40    /  pO2: 201   / HCO3: 28    / Base Excess: 3.5   /  SaO2: 100.0               ======================Micro/Rad/Cardio=================  Culture: Reviewed   CXR: Reviewed  Echo:Reviewed  ======================================================  PAST MEDICAL & SURGICAL HISTORY:  CHF (congestive heart failure)    CAD (coronary artery disease)    Depression    Pleural effusion    History of 2019 novel coronavirus disease (COVID-19)  2020    Hemorrhoids    Bleeding hemorrhoids    Peripheral arterial disease    Claudication    BPH with urinary obstruction    ACC/AHA stage D systolic heart failure    Anticoagulation goal of INR 2.0 to 2.5    Falls    Clavicle fracture    CKD (chronic kidney disease), stage III    Iron deficiency anemia    H/O epistaxis    Vertigo    GI bleed    S/P TVR (tricuspid valve repair)    S/P ventricular assist device    S/P endoscopy      ====================ASSESSMENT ==============  Stage D Nonischemic Cardiomyopathy, Status Post HM2 on 2017    Cardiogenic shock  Hemodynamic instability   Acute hypoxemic respiratory failure s/p trach , decannulated on ; Reintubated on    GI bleed , Status Post Enteroscopy   Anemia, in setting of melena   Chronic Kidney Disease  Stress hyperglycemia   C.diff positive on    Hypovolemic shock  Septic shock  Leukocytosis  GB thickening/percholecystic s/p perc choley by IT   SMA stenosis  Serratia/citrobacter pneumonia   Stenotrophomonas pneumonia   Enterobacter pneumonia   Nasuea/vomiting  Deconditioning     Plan:  ====================== NEUROLOGY=====================  Continue close monitoring of neuro status   PRN Fentanyl and PRN Tylenol for analgesia     acetaminophen    Suspension .. 650 milliGRAM(s) Enteral Tube every 6 hours PRN Mild Pain (1 - 3)  fentaNYL    Injectable 12.5 MICROGram(s) IV Push every 3 hours PRN Moderate to severe pain    ==================== RESPIRATORY======================  Acute hypoxemic respiratory failure s/p #8 shiley trach on ; decannulated on ; Reintubated on ; trach size 6 DCT cuff on 10/4   Continue close monitoring of respiratory rate and breathing pattern, following of ABG’s with A-line monitoring, continuous pulse oximetry monitoring.     Mechanical Ventilation:  Mode: CPAP with PS  FiO2: 40  PEEP: 5  PS: 10  ITime: 1  MAP: 9  PIP: 16      ====================CARDIOVASCULAR==================  Stage D Nonischemic Cardiomyopathy, Status Post HM2 on 2017; LVAD settings 9200, flow 5.7  TTE 10/4:  Severely decreased LV systolic function, Diffuse hypokinesis. Mild AR. No pericardial effusion   VT on 10/4, s/p Lidocaine drip, continue close tele monitoring   Holding off on Propranolol for now     ===================HEMATOLOGIC/ONC ===================  CTA A/P on  +L RP hematoma   Acute blood loss anemia, monitor H&H/Plts   Holding coumadin and ASA given recurrent GI bleeding, continue monitoring LDH     ===================== RENAL =========================  Hx of CKD, continue monitoring urine output, I&OS, BUN/Cr   Replete lytes PRN. Keep K> 4 and Mg >2  C/w Aldactone for diuresis    spironolactone 12.5 milliGRAM(s) Oral every 12 hours    ==================== GASTROINTESTINAL===================  TFs held in setting of ongoing nausea / recent emesis   CT A/P 10/5 neg for intra-abd abcess, severe stenosis of prox SMA and b/l iliac arteries  CTA A/P : Evaluation of the mesenteric vessels is limited by streak artifact from LVAD. There appears to be severe stenosis of the proximal SMA; abdominal mesenteric doppler is recommended for further evaluation. 2.  No small bowel findings to suggest acute mesenteric ischemia. 3.  Focal dissections involving the right and left common iliac arteries.  Stenosis of proximal SMA, s/p failed SMA stent c/b RP bleed on 9/10 - vascular reportedly discussed potential re-op with patient on 10/8 - patient declined   Reglan for gut motility    dextrose 5% + sodium chloride 0.45%. 1000 milliLiter(s) (50 mL/Hr) IV Continuous <Continuous>  multivitamin 1 Tablet(s) Oral daily  GI prophylaxis, pantoprazole  Injectable 40 milliGRAM(s) IV Push every 12 hours  simethicone 80 milliGRAM(s) Chew every 8 hours PRN Gas  sodium chloride 0.9%. 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       Per endocrine      - c/w Methimazole, adjust based on labs        - Check Free T4 and total T3       - c/w Prednisone     insulin lispro (ADMELOG) corrective regimen sliding scale   SubCutaneous every 6 hours  methimazole 20 milliGRAM(s) Oral every 8 hours  predniSONE   Tablet 40 milliGRAM(s) Oral daily    ========================INFECTIOUS DISEASE================  Afebrile, WBC downtrending 15.06->13.30->13.13   Continue trending WBC and monitoring fever curve   CT C/A/P : progressive ISABELLE opacities and L consolidations, multifocal pneumonia  BCx  + Serratia marcescens, BCx 1/2 on 10/1 +Enterobacter cloacae complex, c/w Meropenem - to be completed 10/15   BCx 10/5 + 10/7 NGTD   Vancomycin solution for C.diff prophylaxis    meropenem  IVPB 1000 milliGRAM(s) IV Intermittent every 8 hours  vancomycin    Solution 125 milliGRAM(s) Oral every 12 hours      Patient requires continuous monitoring with bedside rhythm monitoring, pulse ox monitoring, and intermittent blood gas analysis. Care plan discussed with ICU care team. Patient remained critical and at risk for life threatening decompensation.     By signing my name below, I, Agnieszka Cherry, attest that this documentation has been prepared under the direction and in the presence of CLAUDIA Burleson   Electronically signed: Cesia Shaffer, 10-12-21 @ 07:15    I, Anastasiya Moulton, personally performed the services described in this documentation. all medical record entries made by the luisibmel were at my direction and in my presence. I have reviewed the chart and agree that the record reflects my personal performance and is accurate and complete  Electronically signed: CLAUDIA Burleson        RICKY HAMPTON  MRN-[id number]  Patient is a 66y old  Male who presents with a chief complaint of Anemia, Supratherapeutic INR, Dark Stools (12 Oct 2021 07:02)    HPI:  65M PMH ACC/AHA stage D HF due to NICM HM2 LVAD , TV annuloplasty ring 17 as destination therapy due to severe peripheral artery disease with significant stenosis  SIADH, Depression, CKD-3 with hyperkalemia, past E. coli UTIs, driveline drainage (21) and COVID-19 (back in 2020)  He was recently seen in clinic where he complained of abdominal pain and dark stools w constipation back in May. He presents to [facility] ER today weakness and fatigue, moderate and + Black stools for three days, on coumadin secondary to warfarin use in the setting of an LVAD. Patient has required transfusions for GIB in the past. Mostly recently back in 2021 pt had anemia with dark stools. No interventions was done at that time. However Last Endoscopy was done in 2020 (negative). Today labs show patient is anemic with H/H of 4.5/16.3,. INR is 8.84 MAP in the 90s, Temp 35.1. He denies any chest pain, shortness of breath, dizziness, abd pain, nausea or vomiting.       (2021 16:57)      Surgery/Hospital Course:   admit for melena w/ anemia, INR 8.84   6/15 Capsul study (+) for small bowel bleed, balloon endoscopy (old blood in prox ileum); post EGD - septic w/ L opacity, re-intubated for concern for aspiration, TTE (Mod MR, decrease biV w/ interventricular septum boweing towards R)   bronch    +C Diff    CT C/A/P: Fluid filled colon which may be 2/2 rapid transit. Small bilateral pleffs with associates. Compressive atelectasis New ISABELLE & LLL  parenchymal opacities, suspicious for pneumonia. Moderate stenosis in the proximal superior mesenteric artery.    #8 Shiley trach at bedside    LVAD speed increased to 9200   Bronch   TC since . Patient transferred to SDU.    INR today 2.64.  H&H 7.3/24 this AM.  Will repeat CBC at noon, and will send stool guaiac Patient with persistent abdominal tenderness, rate of tube feeds decreased.  No nausea/vomiting.     INR today 2.4. H&H 9.1/28.6 low flow overnight /N&V, refusing Tube feeds on D5 1/2 normal  @50 cc/hr   INR 2.69  H&H 7.7/.1 refusing Tube feeds on D5 1/2 normal  @50 cc/hr. This am + BM Melena Dr Oneill HF  aware- PRBC x1  GI team consulted -  NPO  plan on study in am-  D/w Dr Cadet Patient  to return to CTU for further management; 1PRBCS    Post op INR 2.2 today.  No bleeding. BC + for SM.  Pt is hypotensive requiring pressor and inotropic support.  ID follow up today on Cefepime will follow.   R PTC for PTX    CT C/A/P: sub q emphysema in R chest wall, GGO RUL, small ascites CTH negative; Abd US: GB thickening, pericholecystic fluid     Perchole drain in place continues to drain total output overnight 133.  Fever today 38.8    duplex LE negative    Patient with persistent abdominal pain, refusing tube feeds and medications, Psych consulted   CTA A/P ordered to r/o mesenteric ischemia 2/2 persistent anorexia, nausea, vomiting. Revealed:  Evaluation of the mesenteric vessels is limited by streak artifact from LVAD. There appears to be severe stenosis of the proximal SMA; abdominal mesenteric doppler is recommended for further evaluation. 2.  No small bowel findings to suggest acute mesenteric ischemia. 3.  Focal dissections involving the right and left common iliac arteries.  8/15: Cultures resulted BC 1/2 +GPC in clusters, SC enterobacter; mesenteric duplex: borderline stenosis of proximal SMA  : CT C/A/P noncon: Nondular opacities in R lung apex w/cavitation, abd nl  :  Continue current care, treatment of thyrotoxicosis with medications as per endocrine, d/c ABX as per team.    RUQ sono: Contracted gallbladder with cholecystostomy tube in place.  9/10 failed SMA stent, c/b RP bleed s/p 3U PRCB   CTA Abd/Pelvis L RP hematoma    Trach decannulated    intubated fro resp distress for increased WOB, LL pneumonia; CT C/A/P: progressive ISABELLE opacities and L consolidations, multifocal pneumonia    TTE (EF 25%, decreased RV, mod MR)   10/3 VT -> Lidocaine gtt   10/4 Trach size 6 DCT cuff  10/5 CT abd (neg for intra-abd abcess, severe stenosis of prox SMA and b/l iliac arteries)    Today:  TFs remain on hold. Palliative meeting today to discuss GOC.     REVIEW OF SYSTEMS:  Unable to obtain, limited secondary to pts trach     ICU Vital Signs Last 24 Hrs  T(C): 36.8 (12 Oct 2021 04:00), Max: 37.3 (11 Oct 2021 15:00)  T(F): 98.2 (12 Oct 2021 04:00), Max: 99.2 (11 Oct 2021 20:00)  HR: 91 (12 Oct 2021 06:00) (91 - 124)  BP: --  BP(mean): --  ABP: 95/74 (12 Oct 2021 06:00) (86/74 - 116/91)  ABP(mean): 84 (12 Oct 2021 06:00) (77 - 118)  RR: 13 (12 Oct 2021 06:00) (12 - 27)  SpO2: 100% (12 Oct 2021 06:00) (98% - 100%)      Physical Exam:  Gen:  NAD, limited engagement to conversation  CNS: moves all extremities to command   Neck: no JVD, +trach   RES : coarse breath sounds, no wheezing    CVS: +LVAD hum   Abd: Soft. Sybil drain with bilious fluid. Positive BS throughout. Mild RUQ abdominal tenderness by perc sybil.  Skin: No rash, erythema, cyanosis.  Vasc: Warm and well-perfused.  Ext:  no edema    ============================I/O===========================   I&O's Detail    11 Oct 2021 07:01  -  12 Oct 2021 07:00  --------------------------------------------------------  IN:    dextrose 5% + sodium chloride 0.45%: 930 mL    Enteral Tube Flush: 20 mL    IV PiggyBack: 650 mL    sodium chloride 0.9%: 230 mL  Total IN: 1830 mL    OUT:    Drain (mL): 205 mL    Emesis (mL): 300 mL    Miscellaneous Tube Feedin mL    Voided (mL): 1125 mL  Total OUT: 1630 mL    Total NET: 200 mL        ============================ LABS =========================                        10.8   13.13 )-----------( 222      ( 12 Oct 2021 00:25 )             33.3     10-12    137  |  99  |  8   ----------------------------<  85  3.2<L>   |  25  |  0.39<L>    Ca    9.4      12 Oct 2021 00:25  Phos  2.4     10-12  Mg     1.6     10-12    TPro  7.2  /  Alb  3.6  /  TBili  1.6<H>  /  DBili  x   /  AST  18  /  ALT  18  /  AlkPhos  139<H>  10-12    LIVER FUNCTIONS - ( 12 Oct 2021 00:25 )  Alb: 3.6 g/dL / Pro: 7.2 g/dL / ALK PHOS: 139 U/L / ALT: 18 U/L / AST: 18 U/L / GGT: x             ABG - ( 12 Oct 2021 00:07 )  pH, Arterial: 7.45  pH, Blood: x     /  pCO2: 40    /  pO2: 201   / HCO3: 28    / Base Excess: 3.5   /  SaO2: 100.0               ======================Micro/Rad/Cardio=================  Culture: Reviewed   CXR: Reviewed  Echo:Reviewed  ======================================================  PAST MEDICAL & SURGICAL HISTORY:  CHF (congestive heart failure)    CAD (coronary artery disease)    Depression    Pleural effusion    History of  novel coronavirus disease (COVID-19)  2020    Hemorrhoids    Bleeding hemorrhoids    Peripheral arterial disease    Claudication    BPH with urinary obstruction    ACC/AHA stage D systolic heart failure    Anticoagulation goal of INR 2.0 to 2.5    Falls    Clavicle fracture    CKD (chronic kidney disease), stage III    Iron deficiency anemia    H/O epistaxis    Vertigo    GI bleed    S/P TVR (tricuspid valve repair)    S/P ventricular assist device    S/P endoscopy      ====================ASSESSMENT ==============  Stage D Nonischemic Cardiomyopathy, Status Post HM2 on 2017    Cardiogenic shock  Hemodynamic instability   Acute hypoxemic respiratory failure s/p trach , decannulated on ; Reintubated on    GI bleed , Status Post Enteroscopy   Anemia, in setting of melena   Chronic Kidney Disease  Stress hyperglycemia   C.diff positive on    Hypovolemic shock  Septic shock  Leukocytosis  GB thickening/percholecystic s/p perc choley by IT   SMA stenosis  Serratia/citrobacter pneumonia   Stenotrophomonas pneumonia   Enterobacter pneumonia   Nasuea/vomiting  Deconditioning     Plan:  ====================== NEUROLOGY=====================  Continue close monitoring of neuro status   PRN Fentanyl and PRN Tylenol for analgesia     acetaminophen    Suspension .. 650 milliGRAM(s) Enteral Tube every 6 hours PRN Mild Pain (1 - 3)  fentaNYL    Injectable 12.5 MICROGram(s) IV Push every 3 hours PRN Moderate to severe pain    ==================== RESPIRATORY======================  Acute hypoxemic respiratory failure s/p #8 shiley trach on ; decannulated on ; Reintubated on ; trach size 6 DCT cuff on 10/4   Continue close monitoring of respiratory rate and breathing pattern, following of ABG’s with A-line monitoring, continuous pulse oximetry monitoring.     Mechanical Ventilation:  Mode: CPAP with PS  FiO2: 40  PEEP: 5  PS: 10  ITime: 1  MAP: 9  PIP: 16      ====================CARDIOVASCULAR==================  Stage D Nonischemic Cardiomyopathy, Status Post HM2 on 2017; LVAD settings 9200, flow 5.7  TTE 10/4:  Severely decreased LV systolic function, Diffuse hypokinesis. Mild AR. No pericardial effusion   VT on 10/4, s/p Lidocaine drip, continue close tele monitoring   Holding off on Propranolol for now     ===================HEMATOLOGIC/ONC ===================  CTA A/P on  +L RP hematoma   Acute blood loss anemia, monitor H&H/Plts   Holding coumadin and ASA given recurrent GI bleeding, continue monitoring LDH     ===================== RENAL =========================  Hx of CKD, continue monitoring urine output, I&OS, BUN/Cr   Replete lytes PRN. Keep K> 4 and Mg >2  C/w Aldactone for diuresis    spironolactone 12.5 milliGRAM(s) Oral every 12 hours    ==================== GASTROINTESTINAL===================  TFs held in setting of ongoing nausea / recent emesis   CT A/P 10/5 neg for intra-abd abcess, severe stenosis of prox SMA and b/l iliac arteries  CTA A/P : Evaluation of the mesenteric vessels is limited by streak artifact from LVAD. There appears to be severe stenosis of the proximal SMA; abdominal mesenteric doppler is recommended for further evaluation. 2.  No small bowel findings to suggest acute mesenteric ischemia. 3.  Focal dissections involving the right and left common iliac arteries.  Stenosis of proximal SMA, s/p failed SMA stent c/b RP bleed on 9/10 - vascular reportedly discussed potential re-op with patient on 10/8 - patient declined   Reglan for gut motility    dextrose 5% + sodium chloride 0.45%. 1000 milliLiter(s) (50 mL/Hr) IV Continuous <Continuous>  multivitamin 1 Tablet(s) Oral daily  GI prophylaxis, pantoprazole  Injectable 40 milliGRAM(s) IV Push every 12 hours  simethicone 80 milliGRAM(s) Chew every 8 hours PRN Gas  sodium chloride 0.9%. 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       Per endocrine      - c/w Methimazole, adjust based on labs        - Check Free T4 and total T3       - c/w Prednisone     insulin lispro (ADMELOG) corrective regimen sliding scale   SubCutaneous every 6 hours  methimazole 20 milliGRAM(s) Oral every 8 hours  predniSONE   Tablet 40 milliGRAM(s) Oral daily    ========================INFECTIOUS DISEASE================  Afebrile, WBC downtrending 15.06->13.30->13.13   Continue trending WBC and monitoring fever curve   CT C/A/P : progressive ISABELLE opacities and L consolidations, multifocal pneumonia  BCx  + Serratia marcescens, BCx 1/2 on 10/1 +Enterobacter cloacae complex, c/w Meropenem - to be completed 10/15   BCx 10/5 + 10/7 NGTD   Vancomycin solution for C.diff prophylaxis    meropenem  IVPB 1000 milliGRAM(s) IV Intermittent every 8 hours  vancomycin    Solution 125 milliGRAM(s) Oral every 12 hours    ========================GOALS OF CASE================  Palliative meeting today to discuss goals of care       Patient requires continuous monitoring with bedside rhythm monitoring, pulse ox monitoring, and intermittent blood gas analysis. Care plan discussed with ICU care team. Patient remained critical and at risk for life threatening decompensation.     By signing my name below, I, Agnieszka Cherry, attest that this documentation has been prepared under the direction and in the presence of CLAUDIA Burleson   Electronically signed: Romel Shaffer, 10-12-21 @ 07:15    I, Anastasiya Moulton, personally performed the services described in this documentation. all medical record entries made by the romel were at my direction and in my presence. I have reviewed the chart and agree that the record reflects my personal performance and is accurate and complete  Electronically signed: CLAUDIA Burleson        RICKY HAMPTON  MRN-[id number]  Patient is a 66y old  Male who presents with a chief complaint of Anemia, Supratherapeutic INR, Dark Stools (12 Oct 2021 07:02)    HPI:  65M PMH ACC/AHA stage D HF due to NICM HM2 LVAD , TV annuloplasty ring 17 as destination therapy due to severe peripheral artery disease with significant stenosis  SIADH, Depression, CKD-3 with hyperkalemia, past E. coli UTIs, driveline drainage (21) and COVID-19 (back in 2020)  He was recently seen in clinic where he complained of abdominal pain and dark stools w constipation back in May. He presents to [facility] ER today weakness and fatigue, moderate and + Black stools for three days, on coumadin secondary to warfarin use in the setting of an LVAD. Patient has required transfusions for GIB in the past. Mostly recently back in 2021 pt had anemia with dark stools. No interventions was done at that time. However Last Endoscopy was done in 2020 (negative). Today labs show patient is anemic with H/H of 4.5/16.3,. INR is 8.84 MAP in the 90s, Temp 35.1. He denies any chest pain, shortness of breath, dizziness, abd pain, nausea or vomiting.       (2021 16:57)      Surgery/Hospital Course:   admit for melena w/ anemia, INR 8.84   6/15 Capsul study (+) for small bowel bleed, balloon endoscopy (old blood in prox ileum); post EGD - septic w/ L opacity, re-intubated for concern for aspiration, TTE (Mod MR, decrease biV w/ interventricular septum boweing towards R)   bronch    +C Diff    CT C/A/P: Fluid filled colon which may be 2/2 rapid transit. Small bilateral pleffs with associates. Compressive atelectasis New ISABELLE & LLL  parenchymal opacities, suspicious for pneumonia. Moderate stenosis in the proximal superior mesenteric artery.    #8 Shiley trach at bedside    LVAD speed increased to 9200   Bronch   TC since . Patient transferred to SDU.    INR today 2.64.  H&H 7.3/24 this AM.  Will repeat CBC at noon, and will send stool guaiac Patient with persistent abdominal tenderness, rate of tube feeds decreased.  No nausea/vomiting.     INR today 2.4. H&H 9.1/28.6 low flow overnight /N&V, refusing Tube feeds on D5 1/2 normal  @50 cc/hr   INR 2.69  H&H 7.7/.1 refusing Tube feeds on D5 1/2 normal  @50 cc/hr. This am + BM Melena Dr Oneill HF  aware- PRBC x1  GI team consulted -  NPO  plan on study in am-  D/w Dr Cadet Patient  to return to CTU for further management; 1PRBCS    Post op INR 2.2 today.  No bleeding. BC + for SM.  Pt is hypotensive requiring pressor and inotropic support.  ID follow up today on Cefepime will follow.   R PTC for PTX    CT C/A/P: sub q emphysema in R chest wall, GGO RUL, small ascites CTH negative; Abd US: GB thickening, pericholecystic fluid     Perchole drain in place continues to drain total output overnight 133.  Fever today 38.8    duplex LE negative    Patient with persistent abdominal pain, refusing tube feeds and medications, Psych consulted   CTA A/P ordered to r/o mesenteric ischemia 2/2 persistent anorexia, nausea, vomiting. Revealed:  Evaluation of the mesenteric vessels is limited by streak artifact from LVAD. There appears to be severe stenosis of the proximal SMA; abdominal mesenteric doppler is recommended for further evaluation. 2.  No small bowel findings to suggest acute mesenteric ischemia. 3.  Focal dissections involving the right and left common iliac arteries.  8/15: Cultures resulted BC 1/2 +GPC in clusters, SC enterobacter; mesenteric duplex: borderline stenosis of proximal SMA  : CT C/A/P noncon: Nondular opacities in R lung apex w/cavitation, abd nl  :  Continue current care, treatment of thyrotoxicosis with medications as per endocrine, d/c ABX as per team.    RUQ sono: Contracted gallbladder with cholecystostomy tube in place.  9/10 failed SMA stent, c/b RP bleed s/p 3U PRCB   CTA Abd/Pelvis L RP hematoma    Trach decannulated    intubated fro resp distress for increased WOB, LL pneumonia; CT C/A/P: progressive ISABELLE opacities and L consolidations, multifocal pneumonia    TTE (EF 25%, decreased RV, mod MR)   10/3 VT -> Lidocaine gtt   10/4 Trach size 6 DCT cuff  10/5 CT abd (neg for intra-abd abcess, severe stenosis of prox SMA and b/l iliac arteries)    Today:  TFs remain on hold. Palliative meeting today to discuss GOC.     REVIEW OF SYSTEMS:  Unable to obtain, limited secondary to pts trach     ICU Vital Signs Last 24 Hrs  T(C): 36.8 (12 Oct 2021 04:00), Max: 37.3 (11 Oct 2021 15:00)  T(F): 98.2 (12 Oct 2021 04:00), Max: 99.2 (11 Oct 2021 20:00)  HR: 91 (12 Oct 2021 06:00) (91 - 124)  BP: --  BP(mean): --  ABP: 95/74 (12 Oct 2021 06:00) (86/74 - 116/91)  ABP(mean): 84 (12 Oct 2021 06:00) (77 - 118)  RR: 13 (12 Oct 2021 06:00) (12 - 27)  SpO2: 100% (12 Oct 2021 06:00) (98% - 100%)      Physical Exam:  Gen:  NAD, limited engagement to conversation  CNS: moves all extremities to command   Neck: no JVD, +trach   RES : coarse breath sounds, no wheezing    CVS: +LVAD hum   Abd: Soft. Sybil drain with bilious fluid. Positive BS throughout. Mild RUQ abdominal tenderness by perc sybil.  Skin: No rash, erythema, cyanosis.  Vasc: Warm and well-perfused.  Ext:  no edema    ============================I/O===========================   I&O's Detail    11 Oct 2021 07:01  -  12 Oct 2021 07:00  --------------------------------------------------------  IN:    dextrose 5% + sodium chloride 0.45%: 930 mL    Enteral Tube Flush: 20 mL    IV PiggyBack: 650 mL    sodium chloride 0.9%: 230 mL  Total IN: 1830 mL    OUT:    Drain (mL): 205 mL    Emesis (mL): 300 mL    Miscellaneous Tube Feedin mL    Voided (mL): 1125 mL  Total OUT: 1630 mL    Total NET: 200 mL        ============================ LABS =========================                        10.8   13.13 )-----------( 222      ( 12 Oct 2021 00:25 )             33.3     10-12    137  |  99  |  8   ----------------------------<  85  3.2<L>   |  25  |  0.39<L>    Ca    9.4      12 Oct 2021 00:25  Phos  2.4     10-12  Mg     1.6     10-12    TPro  7.2  /  Alb  3.6  /  TBili  1.6<H>  /  DBili  x   /  AST  18  /  ALT  18  /  AlkPhos  139<H>  10-12    LIVER FUNCTIONS - ( 12 Oct 2021 00:25 )  Alb: 3.6 g/dL / Pro: 7.2 g/dL / ALK PHOS: 139 U/L / ALT: 18 U/L / AST: 18 U/L / GGT: x             ABG - ( 12 Oct 2021 00:07 )  pH, Arterial: 7.45  pH, Blood: x     /  pCO2: 40    /  pO2: 201   / HCO3: 28    / Base Excess: 3.5   /  SaO2: 100.0               ======================Micro/Rad/Cardio=================  Culture: Reviewed   CXR: Reviewed  Echo:Reviewed  ======================================================  PAST MEDICAL & SURGICAL HISTORY:  CHF (congestive heart failure)    CAD (coronary artery disease)    Depression    Pleural effusion    History of  novel coronavirus disease (COVID-19)  2020    Hemorrhoids    Bleeding hemorrhoids    Peripheral arterial disease    Claudication    BPH with urinary obstruction    ACC/AHA stage D systolic heart failure    Anticoagulation goal of INR 2.0 to 2.5    Falls    Clavicle fracture    CKD (chronic kidney disease), stage III    Iron deficiency anemia    H/O epistaxis    Vertigo    GI bleed    S/P TVR (tricuspid valve repair)    S/P ventricular assist device    S/P endoscopy      ====================ASSESSMENT ==============  Stage D Nonischemic Cardiomyopathy, Status Post HM2 on 2017    Cardiogenic shock  Hemodynamic instability   Acute hypoxemic respiratory failure s/p trach , decannulated on ; Reintubated on    GI bleed , Status Post Enteroscopy   Anemia, in setting of melena   Chronic Kidney Disease  Stress hyperglycemia   C.diff positive on    Hypovolemic shock  Septic shock  Leukocytosis  GB thickening/percholecystic s/p perc choley by IT   SMA stenosis  Serratia/citrobacter pneumonia   Stenotrophomonas pneumonia   Enterobacter pneumonia   Nasuea/vomiting  Deconditioning     Plan:  ====================== NEUROLOGY=====================  Continue close monitoring of neuro status   PRN Fentanyl and PRN Tylenol for analgesia   GOC meetings today    acetaminophen    Suspension .. 650 milliGRAM(s) Enteral Tube every 6 hours PRN Mild Pain (1 - 3)  fentaNYL    Injectable 12.5 MICROGram(s) IV Push every 3 hours PRN Moderate to severe pain    ==================== RESPIRATORY======================  Acute hypoxemic respiratory failure s/p #8 shiley trach on ; decannulated on ; Reintubated on ; trach size 6 DCT cuff on 10/4   Continue close monitoring of respiratory rate and breathing pattern, following of ABG’s with A-line monitoring, continuous pulse oximetry monitoring.     Mechanical Ventilation:  Mode: CPAP with PS  FiO2: 40  PEEP: 5  PS: 10  ITime: 1  MAP: 9  PIP: 16      ====================CARDIOVASCULAR==================  Stage D Nonischemic Cardiomyopathy, Status Post HM2 on 2017; LVAD settings 9200, flow 5.7  TTE 10/4:  Severely decreased LV systolic function, Diffuse hypokinesis. Mild AR. No pericardial effusion   VT on 10/4, s/p Lidocaine drip, continue close tele monitoring   Holding off on Propranolol for now     ===================HEMATOLOGIC/ONC ===================  CTA A/P on  +L RP hematoma   Acute blood loss anemia, monitor H&H/Plts   Holding coumadin and ASA given recurrent GI bleeding, continue monitoring LDH     ===================== RENAL =========================  Hx of CKD, continue monitoring urine output, I&OS, BUN/Cr   Replete lytes PRN. Keep K> 4 and Mg >2  C/w Aldactone for diuresis    spironolactone 12.5 milliGRAM(s) Oral every 12 hours    ==================== GASTROINTESTINAL===================  TFs held in setting of ongoing nausea / recent emesis   CT A/P 10/5 neg for intra-abd abcess, severe stenosis of prox SMA and b/l iliac arteries  CTA A/P : Evaluation of the mesenteric vessels is limited by streak artifact from LVAD. There appears to be severe stenosis of the proximal SMA; abdominal mesenteric doppler is recommended for further evaluation. 2.  No small bowel findings to suggest acute mesenteric ischemia. 3.  Focal dissections involving the right and left common iliac arteries.  Stenosis of proximal SMA, s/p failed SMA stent c/b RP bleed on 9/10 - vascular reportedly discussed potential re-op with patient on 10/8 - patient declined   Reglan for gut motility    dextrose 5% + sodium chloride 0.45%. 1000 milliLiter(s) (50 mL/Hr) IV Continuous <Continuous>  multivitamin 1 Tablet(s) Oral daily  GI prophylaxis, pantoprazole  Injectable 40 milliGRAM(s) IV Push every 12 hours  simethicone 80 milliGRAM(s) Chew every 8 hours PRN Gas  sodium chloride 0.9%. 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       Per endocrine      - c/w Methimazole, adjust based on labs        - Check Free T4 and total T3       - c/w Prednisone     insulin lispro (ADMELOG) corrective regimen sliding scale   SubCutaneous every 6 hours  methimazole 20 milliGRAM(s) Oral every 8 hours  predniSONE   Tablet 40 milliGRAM(s) Oral daily    ========================INFECTIOUS DISEASE================  Afebrile, WBC downtrending 15.06->13.30->13.13   Continue trending WBC and monitoring fever curve   CT C/A/P : progressive ISABELLE opacities and L consolidations, multifocal pneumonia  BCx  + Serratia marcescens, BCx 1/2 on 10/1 +Enterobacter cloacae complex, c/w Meropenem - to be completed 10/15   BCx 10/5 + 10/7 NGTD   Vancomycin solution for C. diff prophylaxis    meropenem  IVPB 1000 milliGRAM(s) IV Intermittent every 8 hours  vancomycin    Solution 125 milliGRAM(s) Oral every 12 hours    ========================GOALS OF CASE================  Palliative meeting today to discuss goals of care       Patient requires continuous monitoring with bedside rhythm monitoring, pulse ox monitoring, and intermittent blood gas analysis. Care plan discussed with ICU care team. Patient remained critical and at risk for life threatening decompensation.     By signing my name below, I, Agnieszka Cherry, attest that this documentation has been prepared under the direction and in the presence of CLAUDIA Burleson   Electronically signed: Cesia Shaffer, 10-12-21 @ 07:15    I, Anastasiya Moulton, personally performed the services described in this documentation. all medical record entries made by the luisibe were at my direction and in my presence. I have reviewed the chart and agree that the record reflects my personal performance and is accurate and complete  Electronically signed: CLAUDIA Burleson

## 2021-10-12 NOTE — PROGRESS NOTE ADULT - SUBJECTIVE AND OBJECTIVE BOX
ENT ISSUE/POD: POD #8 for trach    HPI: 67 y/o male POD #8 for tracheostomy #6 DCT cuffed in place 2/2 respiratory failure. Pt is doing well on the vent. no reported issues. Refusing suture removal       PAST MEDICAL & SURGICAL HISTORY:  CHF (congestive heart failure)    CAD (coronary artery disease)    Depression    Pleural effusion    History of 2019 novel coronavirus disease (COVID-19)  april 2020    Hemorrhoids    Bleeding hemorrhoids    Peripheral arterial disease    Claudication    BPH with urinary obstruction    ACC/AHA stage D systolic heart failure    Anticoagulation goal of INR 2.0 to 2.5    Falls    Clavicle fracture    CKD (chronic kidney disease), stage III    Iron deficiency anemia    H/O epistaxis    Vertigo    GI bleed    S/P TVR (tricuspid valve repair)    S/P ventricular assist device    S/P endoscopy      Allergies    Amiodarone Hydrochloride (Other (Severe))    Intolerances      MEDICATIONS  (STANDING):  chlorhexidine 0.12% Liquid 15 milliLiter(s) Oral Mucosa two times a day  chlorhexidine 2% Cloths 1 Application(s) Topical <User Schedule>  dextrose 5% + sodium chloride 0.45%. 1000 milliLiter(s) (50 mL/Hr) IV Continuous <Continuous>  insulin lispro (ADMELOG) corrective regimen sliding scale   SubCutaneous every 6 hours  meropenem  IVPB 1000 milliGRAM(s) IV Intermittent every 8 hours  methimazole 20 milliGRAM(s) Oral every 8 hours  metoclopramide Injectable 10 milliGRAM(s) IV Push every 8 hours  multivitamin 1 Tablet(s) Oral daily  pantoprazole  Injectable 40 milliGRAM(s) IV Push every 12 hours  predniSONE   Tablet 40 milliGRAM(s) Oral daily  sodium chloride 0.9%. 1000 milliLiter(s) (10 mL/Hr) IV Continuous <Continuous>  spironolactone 12.5 milliGRAM(s) Oral every 12 hours  thiamine 100 milliGRAM(s) Oral daily  vancomycin    Solution 125 milliGRAM(s) Oral every 12 hours    MEDICATIONS  (PRN):  acetaminophen    Suspension .. 650 milliGRAM(s) Enteral Tube every 6 hours PRN Mild Pain (1 - 3)  fentaNYL    Injectable 12.5 MICROGram(s) IV Push every 3 hours PRN Moderate to severe pain  simethicone 80 milliGRAM(s) Chew every 8 hours PRN Gas      Vital Signs Last 24 Hrs  T(C): 36.8 (12 Oct 2021 04:00), Max: 37.3 (11 Oct 2021 15:00)  T(F): 98.2 (12 Oct 2021 04:00), Max: 99.2 (11 Oct 2021 20:00)  HR: 91 (12 Oct 2021 06:00) (91 - 124)  BP: --  BP(mean): --  RR: 13 (12 Oct 2021 06:00) (12 - 27)  SpO2: 100% (12 Oct 2021 06:00) (98% - 100%)                          10.8   13.13 )-----------( 222      ( 12 Oct 2021 00:25 )             33.3    10-12    137  |  99  |  8   ----------------------------<  85  3.2<L>   |  25  |  0.39<L>    Ca    9.4      12 Oct 2021 00:25  Phos  2.4     10-12  Mg     1.6     10-12    TPro  7.2  /  Alb  3.6  /  TBili  1.6<H>  /  DBili  x   /  AST  18  /  ALT  18  /  AlkPhos  139<H>  10-12       PHYSICAL EXAM:  Gen: NAD  Skin: No rashes, bruises, or lesions  Head: Normocephalic, Atraumatic  Face: no edema, erythema, or fluctuance. Parotid glands soft without mass  Eyes: no scleral injection  Nose: Nares bilaterally patent, no discharge  Mouth: No Stridor / Drooling / Trismus.  Mucosa moist, tongue/uvula midline, oropharynx clear  Neck:  #6 DCT inflated cuff with minimal serosanguinous oozing from stoma, no active bleeding, sutures x4 and velcro tie in place.  No lymphadenopathy, trachea midline, no masses  Lymphatic: No lymphadenopathy  Resp: breathing easily, no stridor  Neuro: facial nerve intact, no facial droop

## 2021-10-12 NOTE — PROGRESS NOTE ADULT - ASSESSMENT
65 YO M with a history of stage D NICM s/p HM2 on 9/2017 as DT (due to severe PAD) with TV ring, prior COVID-19 infection 4/2020, recurrent syncope post LVAD s/p ILR, and chronic abdominal pain with prior negative workup who was admitted 6/14/21 with symptomatic anemia with Hgb 4.5 in setting of INR 8.8 without hemodynamic instability and has since had a protracted hospitalization. He was transfused and underwent VCE which showed active bleeding in the mid small bowel but subsequent enteroscopy 6/15 did not reveal any active bleeding. He acutely decompensated after procedure with fever/hypertension, low flow alarms, and pulmonary infiltrate with hypoxia requiring intubation from probable aspiration PNA. He was unable to extubated and has since undergone tracheostomy but tolerating persistent trach collar and nearing ability for decannulation. 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 He underwent angiogram on 9/10, stent was unable to be placed and course was then complicated by RP bleed. He continued to have persistent leukocytosis and febrile episodes and was noted to have positive sputum culture for serratia marcescens and completed his course of abx. He was initially decannulated on 9/23. Recently he started having multiples of melena, emesis and went into acte respiratory distress and was ultimately re-intubated on 9/26.     He had blood cultures are positive for enterobacter cloacae/serratia, remains on IV antibiotics. Most recent cultures from 10/8 NGTD. Pressors have been weaned off and MAPs are at goal (70-80). He is s/p trach with ENT on 10/4. Overall prognosis remains poor. Patient is refusing further interventions at this time. Ethics and palliative care following. Will need to finalize GOC with patient and family. 65 YO M with a history of stage D NICM s/p HM2 on 9/2017 as DT (due to severe PAD) with TV ring, prior COVID-19 infection 4/2020, recurrent syncope post LVAD s/p ILR, and chronic abdominal pain with prior negative workup who was admitted 6/14/21 with symptomatic anemia with Hgb 4.5 in setting of INR 8.8 without hemodynamic instability and has since had a protracted hospitalization. He was transfused and underwent VCE which showed active bleeding in the mid small bowel but subsequent enteroscopy 6/15 did not reveal any active bleeding. He acutely decompensated after procedure with fever/hypertension, low flow alarms, and pulmonary infiltrate with hypoxia requiring intubation from probable aspiration PNA. He was unable to extubated and has since undergone tracheostomy but tolerating persistent trach collar and nearing ability for decannulation. 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 He underwent angiogram on 9/10, stent was unable to be placed and course was then complicated by RP bleed. He continued to have persistent leukocytosis and febrile episodes and was noted to have positive sputum culture for serratia marcescens and completed his course of abx. He was initially decannulated on 9/23. Recently he started having multiples of melena, emesis and went into acte respiratory distress and was ultimately re-intubated on 9/26.     He had blood cultures are positive for enterobacter cloacae/serratia, remains on IV antibiotics. Most recent cultures from 10/8 NGTD. Pressors have been weaned off and MAPs are at goal (70-80). He is s/p trach with ENT on 10/4. Overall prognosis remains poor. Patient is refusing further interventions at this time. Ethics and palliative care following. Will need to finalize GOC with patient and family. There is a family meeting later today.

## 2021-10-12 NOTE — PROGRESS NOTE ADULT - ATTENDING COMMENTS
Patient discussed at [facility], ethics was present.   Patient is competent. Patient does not want any further procedures. I explained to the patient yesterday and ethics explained to the patient today that without attempts to address his GI issues patient will continue to deteriorate. Patient appears to want comfort measures.   Plan is for goals of care meeting with patient, family and palliative care today.   Zi Werner

## 2021-10-12 NOTE — PROGRESS NOTE ADULT - SUBJECTIVE AND OBJECTIVE BOX
[facility] NUTRITION SUPPORT-- FOLLOW UP NOTE  --------------------------------------------------------------------------------    24 hour events/subjective: pt seen and examined. remains npo, ngt in place. resting in bed. small amount of emesis overnight per nursing, being suctioned prn. pt c/o nausea and mild abd discomfort. pall care input noted.    Diet:  Diet, NPO with Tube Feed:   Tube Feeding Modality: Nasogastric  Vital 1.5 Tank (VITAL1.5RTH)  Total Volume for 24 Hours (mL): 960  Continuous  Starting Tube Feed Rate mL per Hour: 20  Increase Tube Feed Rate by (mL): 5     Every 6 hours  Until Goal Tube Feed Rate (mL per Hour): 40  Tube Feed Duration (in Hours): 24  Tube Feed Start Time: 10:00 (21 @ 10:11)      PAST HISTORY  --------------------------------------------------------------------------------  No significant changes to PMH, PSH, FHx, SHx, unless otherwise noted    ALLERGIES & MEDICATIONS  --------------------------------------------------------------------------------  Allergies    Amiodarone Hydrochloride (Other (Severe))    Intolerances      Standing Inpatient Medications  chlorhexidine 0.12% Liquid 15 milliLiter(s) Oral Mucosa two times a day  chlorhexidine 2% Cloths 1 Application(s) Topical <User Schedule>  dextrose 5% + sodium chloride 0.45%. 1000 milliLiter(s) IV Continuous <Continuous>  insulin lispro (ADMELOG) corrective regimen sliding scale   SubCutaneous every 6 hours  meropenem  IVPB 1000 milliGRAM(s) IV Intermittent every 8 hours  methimazole 20 milliGRAM(s) Oral every 8 hours  metoclopramide Injectable 10 milliGRAM(s) IV Push every 8 hours  multivitamin 1 Tablet(s) Oral daily  pantoprazole  Injectable 40 milliGRAM(s) IV Push every 12 hours  predniSONE   Tablet 40 milliGRAM(s) Oral daily  sodium chloride 0.9%. 1000 milliLiter(s) IV Continuous <Continuous>  spironolactone 12.5 milliGRAM(s) Oral every 12 hours  thiamine 100 milliGRAM(s) Oral daily  vancomycin    Solution 125 milliGRAM(s) Oral every 12 hours    PRN Inpatient Medications  acetaminophen    Suspension .. 650 milliGRAM(s) Enteral Tube every 6 hours PRN  fentaNYL    Injectable 12.5 MICROGram(s) IV Push every 3 hours PRN  simethicone 80 milliGRAM(s) Chew every 8 hours PRN        REVIEW OF SYSTEMS  --------------------------------------------------------------------------------    General:  as per hpi  HEENT:  no headaches, no vision changes, no ear pain, no epistaxis, no throat pain   CV:  no chest pain, no palpitations  Resp:  no dyspnea  GI:  as per hpi  :  no pain, no bleeding, no dysuria  Muscle:  no pain, no weakness  Neuro:  no numbness, no tingling, no memory problems  Psych:  no insomnia  Endocrine:  no cold/heat intolerance  Heme: no ecchymosis, no easy bruising  Skin:  no rash, no edema      VITALS/PHYSICAL EXAM  --------------------------------------------------------------------------------  T(C): 36.8 (10-12-21 @ 04:00), Max: 37.3 (10-11-21 @ 15:00)  HR: 91 (10-12-21 @ 06:00) (91 - 124)  BP: --  RR: 13 (10-12-21 @ 06:00) (12 - 27)  SpO2: 100% (10-12-21 @ 06:00) (98% - 100%)  Wt(kg): --      10-11-21 @ 07:01  -  10-12-21 @ 07:00  --------------------------------------------------------  IN: 1830 mL / OUT: 1630 mL / NET: 200 mL      I&O's Detail    11 Oct 2021 07:01  -  12 Oct 2021 07:00  --------------------------------------------------------  IN:    dextrose 5% + sodium chloride 0.45%: 930 mL    Enteral Tube Flush: 20 mL    IV PiggyBack: 650 mL    sodium chloride 0.9%: 230 mL  Total IN: 1830 mL    OUT:    Drain (mL): 205 mL    Emesis (mL): 300 mL    Miscellaneous Tube Feedin mL    Voided (mL): 1125 mL  Total OUT: 1630 mL    Total NET: 200 mL          Physical Exam:  Gen: lying in bed in nad +ngt  HEENT: +trach  Chest: respirations non labored  Abd: soft, nondistended  LE:  no edema  Neuro: awake alert responsive        LABS/STUDIES  --------------------------------------------------------------------------------              10.8   13.13 >-----------<  222      [10-12-21 @ 00:25]              33.3     137  |  99  |  8   ----------------------------<  85      [10-12-21 @ 00:25]  3.2   |  25  |  0.39        Ca     9.4     [10-12-21 @ 00:25]      Mg     1.6     [10-12-21 @ 00:25]      Phos  2.4     [10-12-21 @ 00:25]    TPro  7.2  /  Alb  3.6  /  TBili  1.6  /  DBili  x   /  AST  18  /  ALT  18  /  AlkPhos  139  [10-12-21 @ 00:25]              [10-12-21 @ 00:25]    Ca ionizedBlood Gas Arterial - Calcium, Ionized: 1.29 mmol/L (10-12-21 @ 00:07)  Blood Gas Arterial - Calcium, Ionized: 1.29 mmol/L (10-11-21 @ 00:14)    Creatinine Trend:  POC glucoseGlucose, Serum: 85 mg/dL (10-12-21 @ 00:25)  CAPILLARY BLOOD GLUCOSE      POCT Blood Glucose.: 99 mg/dL (12 Oct 2021 05:35)  POCT Blood Glucose.: 86 mg/dL (12 Oct 2021 00:01)  POCT Blood Glucose.: 79 mg/dL (11 Oct 2021 17:24)  POCT Blood Glucose.: 92 mg/dL (11 Oct 2021 13:19)    PrealbuminPrealbumin, Serum: 11 mg/dL (21 @ 04:01)  Prealbumin, Serum: 10 mg/dL (08-15-21 @ 13:53)    Triglycerides

## 2021-10-12 NOTE — PROGRESS NOTE ADULT - PROBLEM SELECTOR PLAN 1
-Continue vent per RT/CTU  - Suction prn   - Continue with trach site care, keep dry and clean   - Plan for team meeting today in regards to GOC. Patient currently refusing suture removal

## 2021-10-12 NOTE — PROGRESS NOTE ADULT - ASSESSMENT
65 year old male with history of Stage D HF due to NICM on LVAD, TV annuloplasty ring 9/12/17 as destination therapy due to severe peripheral artery disease with significant stenosis  SIADH, Depression, CKD-3 with hyperkalemia, admitted 6/14/21 with symptomatic anemia with Hgb 4.5 in setting of INR 8.8, thereafter with complicated hospital course including VAP, serratia bacteremia with acalculous cholecystitis s/p percutaneous tube, abdominal pain s/p attempted SMA stent on 9/10/21, RP bleed, now reintubated in setting of aspiration PNA.     Endocrine consulted for management of hyperthyroidism in the setting of recent amiodarone use and 2 IV contrast CTs with (7/28 and 8/13) and steroid induced hyperglycemia on tube feeds, now resolved    1. 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 propranolol on hold due to low BP. Increased methimazole to 20mg q8. Pt. was treated with steroids initially with better improvement in TFTs. Would recommend continuing steroids for more likely Type 2 AIT. Continue prednisone 40mg daily x 2 weeks (started 10/7) with taper of 10mg every week after the 2 weeks. Will likely need to decrease methimazole if steroids start to improve TFTs more rapidly. Repeat Free T4 on 10/15.  Assess bilirubin and LFTs. MMI can have rare SE of agranuocytosis and hepatic dysfuntion  current increase in bili is multifactorial and likely also component of venous congestion and would not stop MMI for increase in bili,  monitor LFTS closely     2. Steroid induced hyperglycemia, s/p completion of steroids  -Continue low dose correctional scale q6 hrs. May need NPH while on prednisone. Will continue to monitor.      Jude Lr DO, Endocrinology Fellow  Pager [phone] from 9am to 5pm. After hours and on weekends, please call [phone].   65 year old male with history of Stage D HF due to NICM on LVAD, TV annuloplasty ring 9/12/17 as destination therapy due to severe peripheral artery disease with significant stenosis  SIADH, Depression, CKD-3 with hyperkalemia, admitted 6/14/21 with symptomatic anemia with Hgb 4.5 in setting of INR 8.8, thereafter with complicated hospital course including VAP, serratia bacteremia with acalculous cholecystitis s/p percutaneous tube, abdominal pain s/p attempted SMA stent on 9/10/21, RP bleed, now reintubated in setting of aspiration PNA.     Endocrine consulted for management of hyperthyroidism in the setting of recent amiodarone use and multiple IV contrast studies (7/28, 8/13, 9/12, 9/26, 10/5) and steroid induced hyperglycemia on tube feeds, now resolved    1. 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 propranolol on hold due to low BP. Will taper down methimazole to 20mg q12h today (10/12). Pt. was treated with steroids initially with better improvement in TFTs. Would recommend continuing steroids for more likely Type 2 AIT. Decrease prednisone to 30mg daily x for the next week, with reassessment of need for further taper thereafter. Will likely need to decrease methimazole if steroids start to improve TFTs more rapidly. Repeat FT4 in 1 week.  Assess bilirubin and LFTs. MMI can have rare SE of agranuocytosis and hepatic dysfuntion  current increase in bili is multifactorial and likely also component of venous congestion and would not stop MMI for increase in bili,  monitor LFTS closely     2. Steroid induced hyperglycemia, s/p completion of steroids  -D/c correctional insulin. No need to check FSBG so frequently, either.    Jude Lr DO, Endocrinology Fellow  Pager [phone] from 9am to 5pm. After hours and on weekends, please call [phone].

## 2021-10-12 NOTE — PROGRESS NOTE ADULT - SUBJECTIVE AND OBJECTIVE BOX
Chief Complaint: Hyperthyroidism    History: Per patient, having N/V, palpitations and CP.    MEDICATIONS  (STANDING):  chlorhexidine 0.12% Liquid 15 milliLiter(s) Oral Mucosa two times a day  chlorhexidine 2% Cloths 1 Application(s) Topical <User Schedule>  dextrose 5% + sodium chloride 0.45%. 1000 milliLiter(s) (50 mL/Hr) IV Continuous <Continuous>  insulin lispro (ADMELOG) corrective regimen sliding scale   SubCutaneous every 6 hours  meropenem  IVPB 1000 milliGRAM(s) IV Intermittent every 8 hours  methimazole 20 milliGRAM(s) Oral every 8 hours  metoclopramide Injectable 10 milliGRAM(s) IV Push every 8 hours  multivitamin 1 Tablet(s) Oral daily  pantoprazole  Injectable 40 milliGRAM(s) IV Push every 12 hours  predniSONE   Tablet 40 milliGRAM(s) Oral daily  sodium chloride 0.9%. 1000 milliLiter(s) (10 mL/Hr) IV Continuous <Continuous>  spironolactone 12.5 milliGRAM(s) Oral every 12 hours  thiamine 100 milliGRAM(s) Oral daily  vancomycin    Solution 125 milliGRAM(s) Oral every 12 hours    MEDICATIONS  (PRN):  acetaminophen    Suspension .. 650 milliGRAM(s) Enteral Tube every 6 hours PRN Mild Pain (1 - 3)  fentaNYL    Injectable 12.5 MICROGram(s) IV Push every 3 hours PRN Moderate to severe pain  simethicone 80 milliGRAM(s) Chew every 8 hours PRN Gas      PHYSICAL EXAM:  VITALS: T(C): 37.4 (10-12-21 @ 17:00)  T(F): 99.4 (10-12-21 @ 17:00), Max: 99.4 (10-12-21 @ 17:00)  HR: 111 (10-12-21 @ 17:00) (91 - 124)  BP: --  RR:  (12 - 28)  SpO2:  (86% - 100%)  Wt(kg): --  General: Frail male, No acute distress, not speaking.   Eye:  No proptosis or lid lag.   Respiratory:  Respirations are non-labored, Symmetric chest wall expansion.  Cardiovascular:  Tachycardic, irregular rhythm, No edema.  Gastrointestinal:  Tender to palpation diffusely, + guarding, Non-distended.   Integumentary:  Dry.    POCT Blood Glucose.: 114 mg/dL (10-12-21 @ 17:18)  POCT Blood Glucose.: 122 mg/dL (10-12-21 @ 12:09)  POCT Blood Glucose.: 99 mg/dL (10-12-21 @ 05:35)  POCT Blood Glucose.: 86 mg/dL (10-12-21 @ 00:01)  POCT Blood Glucose.: 79 mg/dL (10-11-21 @ 17:24)  POCT Blood Glucose.: 92 mg/dL (10-11-21 @ 13:19)  POCT Blood Glucose.: 111 mg/dL (10-11-21 @ 05:10)  POCT Blood Glucose.: 92 mg/dL (10-10-21 @ 18:11)  POCT Blood Glucose.: 111 mg/dL (10-10-21 @ 12:28)  POCT Blood Glucose.: 70 mg/dL (10-10-21 @ 05:54)  POCT Blood Glucose.: 92 mg/dL (10-09-21 @ 17:33)      10-12    137  |  99  |  8   ----------------------------<  85  3.2<L>   |  25  |  0.39<L>    EGFR if : 146  EGFR if non : 126    Ca    9.4      10-12  Mg     1.6     10-12  Phos  2.4     10-12    TPro  7.2  /  Alb  3.6  /  TBili  1.6<H>  /  DBili  x   /  AST  18  /  ALT  18  /  AlkPhos  139<H>  10-12          Thyroid Function Tests:  10-12 @ 09:04 TSH -- FreeT4 3.4 T3 81 Anti TPO -- Anti Thyroglobulin Ab -- TSI --  10-09 @ 09:57 TSH -- FreeT4 4.0 T3 98 Anti TPO -- Anti Thyroglobulin Ab -- TSI --                         Chief Complaint: Hyperthyroidism    History: Per patient, having N/V, palpitations and CP.    MEDICATIONS  (STANDING):  chlorhexidine 0.12% Liquid 15 milliLiter(s) Oral Mucosa two times a day  chlorhexidine 2% Cloths 1 Application(s) Topical <User Schedule>  dextrose 5% + sodium chloride 0.45%. 1000 milliLiter(s) (50 mL/Hr) IV Continuous <Continuous>  insulin lispro (ADMELOG) corrective regimen sliding scale   SubCutaneous every 6 hours  meropenem  IVPB 1000 milliGRAM(s) IV Intermittent every 8 hours  methimazole 20 milliGRAM(s) Oral every 8 hours  metoclopramide Injectable 10 milliGRAM(s) IV Push every 8 hours  multivitamin 1 Tablet(s) Oral daily  pantoprazole  Injectable 40 milliGRAM(s) IV Push every 12 hours  predniSONE   Tablet 40 milliGRAM(s) Oral daily  sodium chloride 0.9%. 1000 milliLiter(s) (10 mL/Hr) IV Continuous <Continuous>  spironolactone 12.5 milliGRAM(s) Oral every 12 hours  thiamine 100 milliGRAM(s) Oral daily  vancomycin    Solution 125 milliGRAM(s) Oral every 12 hours    MEDICATIONS  (PRN):  acetaminophen    Suspension .. 650 milliGRAM(s) Enteral Tube every 6 hours PRN Mild Pain (1 - 3)  fentaNYL    Injectable 12.5 MICROGram(s) IV Push every 3 hours PRN Moderate to severe pain  simethicone 80 milliGRAM(s) Chew every 8 hours PRN Gas      PHYSICAL EXAM:  VITALS: T(C): 37.4 (10-12-21 @ 17:00)  T(F): 99.4 (10-12-21 @ 17:00), Max: 99.4 (10-12-21 @ 17:00)  HR: 111 (10-12-21 @ 17:00) (91 - 124)  BP: --  RR:  (12 - 28)  SpO2:  (86% - 100%)  Wt(kg): --  General: Frail male, No acute distress, not speaking.   Eye:  No proptosis   Respiratory:  Respirations are non-labored, Symmetric chest wall expansion.  Cardiovascular:  Tachycardic, irregular rhythm, No edema.  Gastrointestinal:  Tender to palpation diffusely, + guarding, Non-distended.   Integumentary:  Dry.    POCT Blood Glucose.: 114 mg/dL (10-12-21 @ 17:18)  POCT Blood Glucose.: 122 mg/dL (10-12-21 @ 12:09)  POCT Blood Glucose.: 99 mg/dL (10-12-21 @ 05:35)  POCT Blood Glucose.: 86 mg/dL (10-12-21 @ 00:01)  POCT Blood Glucose.: 79 mg/dL (10-11-21 @ 17:24)  POCT Blood Glucose.: 92 mg/dL (10-11-21 @ 13:19)  POCT Blood Glucose.: 111 mg/dL (10-11-21 @ 05:10)  POCT Blood Glucose.: 92 mg/dL (10-10-21 @ 18:11)  POCT Blood Glucose.: 111 mg/dL (10-10-21 @ 12:28)  POCT Blood Glucose.: 70 mg/dL (10-10-21 @ 05:54)  POCT Blood Glucose.: 92 mg/dL (10-09-21 @ 17:33)      10-12    137  |  99  |  8   ----------------------------<  85  3.2<L>   |  25  |  0.39<L>    EGFR if : 146  EGFR if non : 126    Ca    9.4      10-12  Mg     1.6     10-12  Phos  2.4     10-12    TPro  7.2  /  Alb  3.6  /  TBili  1.6<H>  /  DBili  x   /  AST  18  /  ALT  18  /  AlkPhos  139<H>  10-12          Thyroid Function Tests:  10-12 @ 09:04 FreeT4 3.4 T3 81  10-09 @ 09:57 FreeT4 4.0 T3 98

## 2021-10-12 NOTE — PROGRESS NOTE ADULT - SUBJECTIVE AND OBJECTIVE BOX
Subjective: Patient seen and examined resting in bed.     Medications:  acetaminophen    Suspension .. 650 milliGRAM(s) Enteral Tube every 6 hours PRN  chlorhexidine 0.12% Liquid 15 milliLiter(s) Oral Mucosa two times a day  chlorhexidine 2% Cloths 1 Application(s) Topical <User Schedule>  dextrose 5% + sodium chloride 0.45%. 1000 milliLiter(s) IV Continuous <Continuous>  fentaNYL    Injectable 12.5 MICROGram(s) IV Push every 3 hours PRN  insulin lispro (ADMELOG) corrective regimen sliding scale   SubCutaneous every 6 hours  meropenem  IVPB 1000 milliGRAM(s) IV Intermittent every 8 hours  methimazole 20 milliGRAM(s) Oral every 8 hours  metoclopramide Injectable 10 milliGRAM(s) IV Push every 8 hours  multivitamin 1 Tablet(s) Oral daily  pantoprazole  Injectable 40 milliGRAM(s) IV Push every 12 hours  predniSONE   Tablet 40 milliGRAM(s) Oral daily  simethicone 80 milliGRAM(s) Chew every 8 hours PRN  sodium chloride 0.9%. 1000 milliLiter(s) IV Continuous <Continuous>  spironolactone 12.5 milliGRAM(s) Oral every 12 hours  thiamine 100 milliGRAM(s) Oral daily  vancomycin    Solution 125 milliGRAM(s) Oral every 12 hours      ICU Vital Signs Last 24 Hrs  T(C): 36.8 (12 Oct 2021 04:00), Max: 37.3 (11 Oct 2021 15:00)  T(F): 98.2 (12 Oct 2021 04:00), Max: 99.2 (11 Oct 2021 20:00)  HR: 91 (12 Oct 2021 06:00) (91 - 124)  BP: --  BP(mean): --  ABP: 95/74 (12 Oct 2021 06:00) (86/74 - 116/91)  ABP(mean): 84 (12 Oct 2021 06:00) (77 - 118)  RR: 13 (12 Oct 2021 06:00) (12 - 27)  SpO2: 100% (12 Oct 2021 06:00) (98% - 100%)    Mode: CPAP with PS  FiO2: 40  PEEP: 5  PS: 10  ITime: 1  MAP: 9  PIP: 16      Weight in k.3 (10-12 @ 00:00)    I&O's Summary    11 Oct 2021 07:01  -  12 Oct 2021 07:00  --------------------------------------------------------  IN: 1830 mL / OUT: 1630 mL / NET: 200 mL        Physical Exam  General: frail, thin appearing and lethargic   Neck:  +trach collar  Chest: +Vent sounds b/l  CV: +VAD hum,  Abdomen: Soft, non-distended, tenderness noted in epigastric region, +biliary drain, +keotube  Neurology: Alert and oriented times three.     LVAD Interrogation  VAD TYPE HM 2  Speed 9200  Flow 5.7  Power 5.8  PI  5.9  Assessment of driveline exit site: driveline dressing c/d/i  Programming changes: no changes made    Labs:                        10.8   13.13 )-----------( 222      ( 12 Oct 2021 00:25 )             33.3     10-12    137  |  99  |  8   ----------------------------<  85  3.2<L>   |  25  |  0.39<L>    Ca    9.4      12 Oct 2021 00:25  Phos  2.4     10-12  Mg     1.6     10-12    TPro  7.2  /  Alb  3.6  /  TBili  1.6<H>  /  DBili  x   /  AST  18  /  ALT  18  /  AlkPhos  139<H>  10-12              Lactate Dehydrogenase, Serum: 228 U/L (10-12 @ 00:25)  Lactate Dehydrogenase, Serum: 216 U/L (10-11 @ 00:24)  Lactate Dehydrogenase, Serum: 234 U/L (10-10 @ 01:22)

## 2021-10-12 NOTE — PROGRESS NOTE ADULT - PROBLEM SELECTOR PROBLEM 6
Post-operative pain
Post-operative pain
Hyponatremia
Hyponatremia
Post-operative pain
Post-operative pain
Hyponatremia
Hyponatremia
Cardiac cachexia
Cardiac cachexia
Hyponatremia
Post-operative pain
Minoxidil Pregnancy And Lactation Text: This medication has not been assigned a Pregnancy Risk Category but animal studies failed to show danger with the topical medication. It is unknown if the medication is excreted in breast milk.

## 2021-10-12 NOTE — PROGRESS NOTE ADULT - PROBLEM SELECTOR PLAN 3
- Vascular surgery following, may reattempt via subclavian approach  - Patient aware of risk and benefits and is refusing any intervention to be completed

## 2021-10-12 NOTE — PROGRESS NOTE ADULT - ASSESSMENT
50 y/o POD #8 for tracheostomy 2/2 respiratory failure. Pt is doing well, #6 DCT cuffed in place, on the vent, with minimal serosanguinous oozing from stoma, no active bleed. sutures x4 and velcro tie in place. Patient refusing suture removal. Will try again later today. Per nursing, plan for team meeting today

## 2021-10-12 NOTE — PROGRESS NOTE ADULT - ATTENDING COMMENTS
patient refusing suture removal and so will respect his wishes and leave sutures in place. goals of care meeting planned.

## 2021-10-13 NOTE — PROGRESS NOTE ADULT - ASSESSMENT
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 coli UTIs, driveline drainage (1/7/21) and COVID-19 (back in April 2020)  He was recently seen in clinic where he complained of abdominal pain and dark stools w constipation back in May. He presents to [facility] ER today weakness and fatigue, moderate and + Black stools for three days, on coumadin secondary to warfarin use in the setting of an LVAD. Patient has required transfusions for GIB in the past. Mostly recently back in jan 2021 pt had anemia with dark stools. No interventions was done at that time. However Last Endoscopy was done in July 2020 (negative). Today labs show patient is anemic with H/H of 4.5/16.3,. INR is 8.84 MAP in the 90s,   Returned from endoscopy appearing ill tachypneic with chills with new white out of his left lung      A/p  s/p LVAD placement  admission prolonged following GI bleeding, aspiration event, CDI, VAP, chronic abdominal pain, most recently with retroperitoneal bleeding post attempted stent placement  Acute event while on the floor with possible aspiration and required re-intubation and transfer to cTU  Antibiotics with Meropenem  serratia in blood culture on 9/30  and enterobacter in blood culture 10/1 likely from a GI source given the multiple GNRs grown but CT scan without evidence of a specific source raises the possiblilty of GI translocation in the setting on ongoing ischemic bowel?  Continue Meropenem  plan 2 week course (until 10/16)  repeat blood cutlures sent and no growth    plan for possible SMA stenting by Vascular this week    Morris Prater MD  [phone]  After 5pm/weekends [phone]

## 2021-10-13 NOTE — CHART NOTE - NSCHARTNOTEFT_GEN_A_CORE
Enrike Gonzalez  ID #     Met with the patient to f/u with him regarding the [facility] meeting I had with him, his family, and  the multidisciplinary team yesterday. He wanted to know about the status of the plan we discussed on 10/12. I briefly summarized the outcome of yesterday's meeting, basically that he transferred decision making to his family (sister and HCP and his son) and that they have decided for vascular to perform a surgical interventions. The patient verbalized understanding about the current plan of care. He then asked me about how long will it take for the surgery to be scheduled. He also wanted to know about how long will it last.     I reached out to vascular Sx who will try to f/u with the patient so information about the duration of the procedure and the time needed schedule it can be provided to the patient.         Francisco Burgos MD   Geriatrics and Palliative Care (GAP) Consult Service    of Geriatric and Palliative Medicine  [facility]      Please page the following number for clinical matters between the hours of 9 am and 5 pm from Monday through Friday : [phone]    After 5pm and on weekends, please see the contact information below:    In the event of newly developing, evolving, or worsening symptoms, please contact the Palliative Medicine team via pager (if the patient is at [facility] #[phone] or if the patient is at [facility] #[phone]) The Geriatric and Palliative Medicine service has coverage 24 hours a day/ 7 days a week to provide medical recommendations regarding symptom management needs via telephone Enrike Gonzalez  ID #     Met with the patient to f/u with him regarding the [facility] meeting I had with him, his family, and  the multidisciplinary team yesterday. He wanted to know about the status of the plan we discussed on 10/12. I briefly summarized the outcome of yesterday's meeting, basically that he transferred decision making to his family (sister and HCP and his son) and that they have decided for vascular to perform a surgical interventions. The patient verbalized understanding about the current plan of care. He then asked me about how long will it take for the surgery to be scheduled. He also wanted to know about how long will it last.     I reached out to vascular Sx, Dr. Pardo, who will try to f/u with the patient so information about the duration of the procedure and the time needed to schedule it can be provided to the patient.         Francisco Burgos MD   Geriatrics and Palliative Care (GAP) Consult Service    of Geriatric and Palliative Medicine  [facility]      Please page the following number for clinical matters between the hours of 9 am and 5 pm from Monday through Friday : [phone]    After 5pm and on weekends, please see the contact information below:    In the event of newly developing, evolving, or worsening symptoms, please contact the Palliative Medicine team via pager (if the patient is at [facility] #[phone] or if the patient is at [facility] #[phone]) The Geriatric and Palliative Medicine service has coverage 24 hours a day/ 7 days a week to provide medical recommendations regarding symptom management needs via telephone Enrike Gonzalez  ID #     Met with the patient to f/u with him regarding the [facility] meeting I had with him, his family, and  the multidisciplinary team yesterday. He wanted to know about the status of the plan we discussed on 10/12. I briefly summarized the outcome of yesterday's meeting, basically that he transferred decision making to his family (sister and HCP and his son) and that they have decided for vascular to perform a surgical interventions. The patient verbalized understanding about the current plan of care. He then asked me about how long will it take for the surgery to be scheduled. He also wanted to know about how long will it last.     I reached out to vascular Sx, Dr. Pardo, who will try to f/u with the patient so information about the duration of the procedure and the time needed to schedule it can be provided to the patient.     I spent a total time of 20  mins with the patient at bedside of which more than 50% of time was spent on counseling/coordination of care.       Francisco Burgos MD   Geriatrics and Palliative Care (GAP) Consult Service    of Geriatric and Palliative Medicine  [facility]      Please page the following number for clinical matters between the hours of 9 am and 5 pm from Monday through Friday : [phone]    After 5pm and on weekends, please see the contact information below:    In the event of newly developing, evolving, or worsening symptoms, please contact the Palliative Medicine team via pager (if the patient is at [facility] #[phone] or if the patient is at [facility] #[phone]) The Geriatric and Palliative Medicine service has coverage 24 hours a day/ 7 days a week to provide medical recommendations regarding symptom management needs via telephone

## 2021-10-13 NOTE — PROGRESS NOTE ADULT - ATTENDING COMMENTS
no new events. patient continues to have abdo pain and vomitting and is not receiving feeds.   meds: misty, vanco PO, ald 12.5, reglan, PPi, pred 30, methymathole. off ac.   LVAD flows 5.5, speed 9200. power 6.2  HR -110, MAPs 88-90. afebrile.   I/o: +130 .                           10.7   14.78 )-----------( 221      ( 13 Oct 2021 01:37 )             32.3   WBC 14.8, 13.0   10-13    135  |  99  |  6<L>  ----------------------------<  85  3.3<L>   |  25  |  0.38<L>  Ca    9.5      13 Oct 2021 01:38  Phos  1.9     10-13  Mg     1.6     10-13  TPro  7.3  /  Alb  3.7  /  TBili  1.2  /  DBili  x   /  AST  24  /  ALT  24  /  AlkPhos  139<H>  10-13    Overall plan remains unclear. Family meeting with ethics and palliative care yesterday established that patient would proceed with whatever his family recommended. However today patient is not agreeing to move forward with vascular procedure. Vascular service require (appropriately) that patient must assent to any procedure. Discussed on MDR rounds. If patient does not assent to vascular procedure tomorrow, will reopen discussion about palliative measures.   Zi Werner

## 2021-10-13 NOTE — PROGRESS NOTE ADULT - SUBJECTIVE AND OBJECTIVE BOX
INFECTIOUS DISEASES FOLLOW UP-- Anitra Prater  [phone]    This is a follow up note for this  66yMale with  Anemia    Acute cholecystitis    SMA vessel blockage/poor blood flow        ROS:  CONSTITUTIONAL: awake, alert, interactive  persistent vomiting    Allergies    Amiodarone Hydrochloride (Other (Severe))    Intolerances        ANTIBIOTICS/RELEVANT:  antimicrobials  meropenem  IVPB 1000 milliGRAM(s) IV Intermittent every 8 hours  vancomycin    Solution 125 milliGRAM(s) Oral every 12 hours    immunologic:    OTHER:  acetaminophen    Suspension .. 650 milliGRAM(s) Enteral Tube every 6 hours PRN  chlorhexidine 0.12% Liquid 15 milliLiter(s) Oral Mucosa two times a day  chlorhexidine 2% Cloths 1 Application(s) Topical <User Schedule>  dextrose 5% + sodium chloride 0.45% with potassium chloride 10 mEq/L 1000 milliLiter(s) IV Continuous <Continuous>  fentaNYL    Injectable 12.5 MICROGram(s) IV Push every 3 hours PRN  insulin lispro (ADMELOG) corrective regimen sliding scale   SubCutaneous every 6 hours  methimazole 20 milliGRAM(s) Oral <User Schedule>  metoclopramide Injectable 10 milliGRAM(s) IV Push every 8 hours  multivitamin 1 Tablet(s) Oral daily  pantoprazole  Injectable 40 milliGRAM(s) IV Push every 12 hours  predniSONE   Tablet 30 milliGRAM(s) Oral daily  simethicone 80 milliGRAM(s) Chew every 8 hours PRN  sodium chloride 0.9%. 1000 milliLiter(s) IV Continuous <Continuous>  spironolactone 12.5 milliGRAM(s) Oral every 12 hours  thiamine 100 milliGRAM(s) Oral daily      Objective:  Vital Signs Last 24 Hrs  T(C): 37 (13 Oct 2021 16:00), Max: 37 (13 Oct 2021 16:00)  T(F): 98.6 (13 Oct 2021 16:00), Max: 98.6 (13 Oct 2021 16:00)  HR: 114 (13 Oct 2021 18:00) (98 - 132)  BP: --  BP(mean): --  RR: 12 (13 Oct 2021 18:00) (12 - 23)  SpO2: 100% (13 Oct 2021 18:00) (91% - 100%)    PHYSICAL EXAM:  Constitutional:no acute distress  Eyes:ALONSO, EOMI  Ear/Nose/Throat: no oral lesions, continues to vomit daily	  Respiratory: clear BL  Cardiovascular: [id number] sounds  Gastrointestinal: tender to palpation  VAD dressing intact  cholecystotomy tube  Extremities:no e/e/c  No Lymphadenopathy  IV sites not inflammed.    LABS:                        10.7   14.78 )-----------( 221      ( 13 Oct 2021 01:37 )             32.3     10-13    135  |  99  |  6<L>  ----------------------------<  85  3.3<L>   |  25  |  0.38<L>    Ca    9.5      13 Oct 2021 01:38  Phos  1.9     10-13  Mg     1.6     10-13    TPro  7.3  /  Alb  3.7  /  TBili  1.2  /  DBili  x   /  AST  24  /  ALT  24  /  AlkPhos  139<H>  10-13          MICROBIOLOGY:            RECENT CULTURES:  10-07 @ 14:09  .Blood Blood-Peripheral  --  --  --    No Growth Final  --      RADIOLOGY & ADDITIONAL STUDIES:    < from: Xray Chest 1 View- PORTABLE-Routine (Xray Chest 1 View- PORTABLE-Routine in AM.) (10.13.21 @ 02:36) >  IMPRESSION:  All lines and tubes as above.    LVAD obscures left costophrenic angle. Otherwise clear lungs.    < end of copied text >

## 2021-10-13 NOTE — PROGRESS NOTE ADULT - ASSESSMENT
Assessment and Recommendation:   · Assessment	  Assessment and recommendation :  Recurrent Acute hypoxic respiratory Failure reintubated on full ventilator support FI02 is 40% S/P tracheostomy    Acute blood loss anemia S/P multiple  blood transfusion post tracheostomy   recurrent  septic shock  weaned off  vasopressin   hemorrhagic shock receiving 2 unit of blood transfusion   pan culture negative on meropenem  , po vancomycin   S/P cholecystostomy tube placement by IR    patient refuse repeat vacular procedure   AF RVR back to regular sinus Rhythm   Non ischemic cardiomyopathy continue ACE inhibitor and B-Blockers   mesenteric ischemia failure to stent SMA , no plan for repeated trial   S/P 3 unit of blood transfusion   INR is very high repeat , no evidence of bleeding   Stage D systolic heart failure S/P LVAD HM2   MH2 LVAD  with  TV Annuloplasty  Severe peripheral vascular disease   severe hyperglycemia on insulin coverage    Reglan 10 mg for Gastric Motility   hyperthyroidism on Methimazole 10mg TID   critical care polyneuropathy   Anemia of Acute blood Loss   severe protein caloric malnutrition   magnesium supplement keep above 2   potasium supplement   Chronic kidney disease stage III  Depressed and withdrawn   resume NG tube feeding   GI prophylaxis with PPI   weaning trial ?  Discussed with TCV team   critical care time is 35 minutes

## 2021-10-13 NOTE — PROGRESS NOTE ADULT - PROBLEM SELECTOR PLAN 3
- Vascular surgery following, may reattempt via subclavian approach  - Patient was made aware of risk and benefits, please have vascular come by today and discuss intervention with patient. If patient says he wants to proceed will need to obtain consent from his HCP

## 2021-10-13 NOTE — PROGRESS NOTE ADULT - PROBLEM SELECTOR PLAN 5
- Unclear source, likely enteric  - Prior cholecystitis w/ per dawn drain with bilious output  - Continue meropenem and PO vanc, recs as per ID  - Most recent culture 10/7 negative

## 2021-10-13 NOTE — PROGRESS NOTE ADULT - ASSESSMENT
65 YO M with a history of stage D NICM s/p HM2 on 9/2017 as DT (due to severe PAD) with TV ring, prior COVID-19 infection 4/2020, recurrent syncope post LVAD s/p ILR, and chronic abdominal pain with prior negative workup who was admitted 6/14/21 with symptomatic anemia with Hgb 4.5 in setting of INR 8.8 without hemodynamic instability and has since had a protracted hospitalization. He was transfused and underwent VCE which showed active bleeding in the mid small bowel but subsequent enteroscopy 6/15 did not reveal any active bleeding. He acutely decompensated after procedure with fever/hypertension, low flow alarms, and pulmonary infiltrate with hypoxia requiring intubation from probable aspiration PNA. He was unable to extubated and has since undergone tracheostomy but tolerating persistent trach collar and nearing ability for decannulation. 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 He underwent angiogram on 9/10, stent was unable to be placed and course was then complicated by RP bleed. He continued to have persistent leukocytosis and febrile episodes and was noted to have positive sputum culture for serratia marcescens and completed his course of abx. He was initially decannulated on 9/23. Recently he started having multiples of melena, emesis and went into acte respiratory distress and was ultimately re-intubated on 9/26.     He had blood cultures are positive for enterobacter cloacae/serratia, remains on IV antibiotics. Most recent cultures from 10/8 NGTD. Pressors have been weaned off and MAPs are at goal (70-80). He is s/p trach with ENT on 10/4. Will have vascular come by today and discuss intervention one last time, if agreeable consent will need to be obtained from HCP.  Ethics and palliative care following. His overall prognosis remains poor. Of note if patient is not agreeable to intervention will focus on making patient comfortable.

## 2021-10-13 NOTE — PROGRESS NOTE ADULT - PROBLEM SELECTOR PLAN 2
- Stable w/o evidence of LVAD malfunction, settings as above  - Unable to tolerate AC or ECASA due to recurrent GI bleeding  - noted to have slight bump in LDH, continue to trend daily

## 2021-10-13 NOTE — PROGRESS NOTE ADULT - SUBJECTIVE AND OBJECTIVE BOX
Subjective: Patient seen and examined resting in bed.     Medications:  acetaminophen    Suspension .. 650 milliGRAM(s) Enteral Tube every 6 hours PRN  chlorhexidine 0.12% Liquid 15 milliLiter(s) Oral Mucosa two times a day  chlorhexidine 2% Cloths 1 Application(s) Topical <User Schedule>  dextrose 5% + sodium chloride 0.45%. 1000 milliLiter(s) IV Continuous <Continuous>  fentaNYL    Injectable 12.5 MICROGram(s) IV Push every 3 hours PRN  insulin lispro (ADMELOG) corrective regimen sliding scale   SubCutaneous every 6 hours  meropenem  IVPB 1000 milliGRAM(s) IV Intermittent every 8 hours  methimazole 20 milliGRAM(s) Oral <User Schedule>  metoclopramide Injectable 10 milliGRAM(s) IV Push every 8 hours  multivitamin 1 Tablet(s) Oral daily  pantoprazole  Injectable 40 milliGRAM(s) IV Push every 12 hours  predniSONE   Tablet 30 milliGRAM(s) Oral daily  simethicone 80 milliGRAM(s) Chew every 8 hours PRN  sodium chloride 0.9%. 1000 milliLiter(s) IV Continuous <Continuous>  spironolactone 12.5 milliGRAM(s) Oral every 12 hours  thiamine 100 milliGRAM(s) Oral daily  vancomycin    Solution 125 milliGRAM(s) Oral every 12 hours      ICU Vital Signs Last 24 Hrs  T(C): 36.9 (13 Oct 2021 04:00), Max: 37.4 (12 Oct 2021 16:00)  T(F): 98.4 (13 Oct 2021 04:00), Max: 99.4 (12 Oct 2021 16:00)  HR: 105 (13 Oct 2021 05:00) (95 - 116)  BP: --  BP(mean): --  ABP: 105/79 (13 Oct 2021 05:00) (88/72 - 106/76)  ABP(mean): 89 (13 Oct 2021 05:00) (82 - 91)  RR: 22 (13 Oct 2021 05:00) (12 - 22)  SpO2: 100% (13 Oct 2021 05:00) (86% - 100%)      Mode: CPAP with PS  FiO2: 40  PEEP: 5  PS: 10  MAP: 9  PIP: 16      Weight in k.3 (10-13 @ 00:00)    I&O's Summary    12 Oct 2021 07:01  -  13 Oct 2021 07:00  --------------------------------------------------------  IN: 1635 mL / OUT: 1505 mL / NET: 130 mL        Physical Exam  General: frail, thin appearing and lethargic   Neck:  +trach collar  Chest: +Vent sounds b/l  CV: +VAD hum,  Abdomen: Soft, non-distended, tenderness noted in epigastric region, +biliary drain, +keotube  Neurology: Alert and oriented times three.       LVAD Interrogation  VAD TYPE HM2    Speed 9200  Flow 5.5  Power 5.7  PI  6.3  Assessment of driveline exit site: driveline dressing c/d/i  Programming changes: no changes made    Labs:                        10.7   14.78 )-----------( 221      ( 13 Oct 2021 01:37 )             32.3     10-13    135  |  99  |  6<L>  ----------------------------<  85  3.3<L>   |  25  |  0.38<L>    Ca    9.5      13 Oct 2021 01:38  Phos  1.9     10-13  Mg     1.6     10-13    TPro  7.3  /  Alb  3.7  /  TBili  1.2  /  DBili  x   /  AST  24  /  ALT  24  /  AlkPhos  139<H>  10-13              Lactate Dehydrogenase, Serum: 253 U/L (10-13 @ 01:38)  Lactate Dehydrogenase, Serum: 228 U/L (10-12 @ 00:25)  Lactate Dehydrogenase, Serum: 216 U/L (10-11 @ 00:24)

## 2021-10-13 NOTE — PROGRESS NOTE ADULT - SUBJECTIVE AND OBJECTIVE BOX
RICKY JOINT  MRN#: [id number]  Subjective:  Pulmonary progress  : recurrent Acute hypoxic respiratory Failure ,aspiration pneumonia, NICM  ,   65M PMH ACC/AHA stage D HF due to NICM HM2 LVAD , TV annuloplasty ring 17 as destination therapy due to severe peripheral artery disease with significant stenosis  SIADH, Depression, CKD-3 with hyperkalemia, past E. coli UTIs, driveline drainage (21) and COVID-19 (back in 2020)  He was recently seen in clinic where he complained of abdominal pain and dark stools w constipation back in May. He presents to [facility] ER today weakness and fatigue, moderate and + Black stools for three days, on coumadin secondary to warfarin use in the setting of an LVAD. Patient has required transfusions for GIB in the past. Mostly recently back in 2021 pt had anemia with dark stools. No interventions was done at that time. However Last Endoscopy was done in 2020 (negative). Today labs show patient is anemic with H/H of 4.5/16.3,. INR is 8.84 MAP in the 90s, Temp 35.1. He denies any chest pain, shortness of breath, dizziness, abd pain, nausea or vomiting. found to have  rectal bleeding underwent endoscopy ,old blood in the proximal ileum ,  develop sepsis with LL opacity given Antibiotics , Extubated , reintubated , Bronchoscopy on Zosyn for LL pneumonia  and Amiodarone S/P TV Annuloplasty , patient remain intubated on full ventilatory support .S/P multiple units of blood transfusion , remain on full ventilatory support on Precedex and propofol , new central IJ line , diarrhea C diff. +ve on po vancomycin and IV Flagyl,  mildly distended belly , fever start on cefepime 2gm q 8 hrs S/P tracheostomy .  new RT Subclavian central line continue on contact  isolation ,S/P  C-diff antibiotics, no more diarrhea back on full support mechanical ventilator , chest x ray show improvement in LLL air space disease, more awake and responsive on tube feeding no more diarrhea ,  no nausea or vomiting or diarrhea still very weak and tired , back on tube feeding ,still on po vancomycin , getting PT and OT at bed side ,   , no SOB getting stronger , improve muscle tone patient transfer to monitor bed still on contact isolation for C-Difficel colitis on 50% FI02, and change to Suresh  tube feeding still loose stool . H/H drop significantly require blood transfusion , most likely GI bleeding , IV heparin D/C ,  H/H is stable ., patient develop TR sided  pneumothorax require chest tube placement , RT IJ central line  placed , develop fever shaking chills , blood culture positive for serratia marcescens , start on cefepime .the patient  become hypoxic and hypotensive placed on full ventilatory support and Vasopressin , levophed and Dobutamine ,S/P blood transfusion on meropenem and vancomycin ,   , on and  off pressors , occasional agitation on Precedex .S/P IR cholecystostomy tube drainage placement in the RT upper Quadrant , resume anticoagulation chest x ray noted C-PAP trail lasted only for 2 hrs , new RT SC line and D/C RT IJ line , RT pig tail cathter has been removed , tolerating C-PAP trial placed on trach. collar 50% FI02 GI consultant noted on NG tube feeding as tolerated , develop AF RVR S/P  bolus Amiodarone  back to regular sinus Rhythm , Flat Affect depressed , back on tube feeding Vital AF at 60 cc/ hr .still intermittent abdominal pain , no fever saturation is accepted  back on full ventilatory support ,   on methimazole for hyperthyroidism ,  condition is the same ,still C/O Abdominal pain , white count is improving , no chest pain or SOB ,  .placed on Reglan 10 mg TID for gastric motility , depressed , withdrawn. , S/P  mesenteric angiogram , unable to stent SMA S/P 3 units of blood transfusion   RT IJ in place reassessed for stent in the SMA by vascular,  dark stool stable H/H ,surgical opinion for possible Lap cholecystectomy. over night events noted develop respiratory distress, , rectal bleeding, require intubation placed on full ventilatory support , FI02 is 50% also became hemianosmically unstable require triple pressor support with levophed , vasopressin and norsynephrine, pan culture placed  on Vancomycin IV and PO  and Zosyn, sedated with propofol kept NPO , new central line RT and left IJ cathter placed . Diflucan Added .fever of 38.5 weaned off  vasopressin , all culture are negative, resume tube feeding ,  white count is improving , on meropenem, vancomycin solution  , on Precedex, no over night events , S/P  tracheostomy , bleeding receiving blood transfusion on vasopressin , no more bleeding , no fever , more awake and responsive ,tolerating tube feeding , ID and heart failure follow up appreciated , depresses no weaning for now , magnesium is low to give supplement too keep Above 2 , potassium is low to give supplement , patient refuse vascular procedure for superior mesenteric artery occlusion      (2021 16:57)    PAST MEDICAL & SURGICAL HISTORY:  CHF (congestive heart failure)    CAD (coronary artery disease)  Depression    Pleural effusion    History of 2019 novel coronavirus disease (COVID-19)  2020    Hemorrhoids    Bleeding hemorrhoids    Peripheral arterial disease    Claudication    BPH with urinary obstruction    ACC/AHA stage D systolic heart failure  Anticoagulation goal of INR 2.0 to 2.5    Falls    Clavicle fracture    CKD (chronic kidney disease), stage III    Iron deficiency anemia    H/O epistaxis    Vertigo    GI bleed    S/P TVR (tricuspid valve repair)    S/P ventricular assist device    S/P endoscopy    OBJECTIVE:  ICU Vital Signs Last 24 Hrs  T(C): 38.2 (2021 10:00), Max: 38.5 (2021 12:00)  T(F): 100.8 (2021 10:00), Max: 101.3 (2021 12:00)  HR: 65 (2021 10:00) (61 - 69)  BP: --  BP(mean): --  ABP: 105/67 (2021 10:00) (90/54 - 113/64)  ABP(mean): 77 (2021 10:00) (63 - 77)  RR: 20 (2021 10:00) (19 - 35)  SpO2: 99% (2021 10:00) (96% - 100%)       @ 07:  -   @ 07:00  --------------------------------------------------------  IN: 2693.9 mL / OUT: 1415 mL / NET: 1278.9 mL     @ 07:01  -   @ 10:49  --------------------------------------------------------  IN: 420.8 mL / OUT: 115 mL / NET: 305.8 mL  PHYSICAL EXAM: Daily   Elderly male intubated on full ventilatory support FI02 is 40% S/P tracheostomy off   vasopressin , Precedex ,  Daily Weight in k.4 (2021 00:00)  HEENT:     + NCAT  + EOMI  - Conjuctival edema   - Icterus   - Thrush   - ETT  + NGT/OGT  Neck:         + FROM  RT IJ , LT IJ  lines JVD  - Nodes - Masses + Mid-line trachea + Tracheostomy  Chest:            normal A-P diameter    Lungs:          + CTA   + Rhonchi    - Rales    - Wheezing + Decreased  LT BS   - Dullness R L  Cardiac:       + S1 + S2    + RRR   - Irregular   - S3  - S4    - Murmurs   - Rub   - Hamman’s sign   Abdomen:    + BS  + Soft + Non-tender - Distended - Organomegaly - PEG .cholecystostomy tube in place  Extremities:   - Cyanosis U/L   - Clubbing  U/L  + LE/UE Edema   + Capillary refill    + Pulses   Neuro:        - Awake   -  Alert   - Confused   - Lethargic   + Sedated  + Generalized Weakness  Skin:        - Rashes    - Erythema   + Normal incisions   + IV sites intact          HOSPITAL MEDICATIONS: All medications reviewed and analyzed  MEDICATIONS  (STANDING):  amiodarone    Tablet 200 milliGRAM(s) Oral daily  chlorhexidine 0.12% Liquid 15 milliLiter(s) Oral Mucosa every 12 hours  chlorhexidine 2% Cloths 1 Application(s) Topical <User Schedule>  dexmedetomidine Infusion 0.5 MICROgram(s)/kG/Hr (9.81 mL/Hr) IV Continuous <Continuous>  dextrose 50% Injectable 50 milliLiter(s) IV Push every 15 minutes  heparin  Infusion 400 Unit(s)/Hr (12.5 mL/Hr) IV Continuous <Continuous>  Hydromorphone  Injectable 0.5 milliGRAM(s) IV Push once  insulin lispro (ADMELOG) corrective regimen sliding scale   SubCutaneous every 6 hours  pantoprazole  Injectable 40 milliGRAM(s) IV Push every 12 hours  piperacillin/tazobactam IVPB.. 3.375 Gram(s) IV Intermittent every 8 hours  propofol Infusion 20 MICROgram(s)/kG/Min (9.42 mL/Hr) IV Continuous <Continuous>  sodium chloride 0.9% lock flush 3 milliLiter(s) IV Push every 8 hours  sodium chloride 0.9%. 1000 milliLiter(s) (10 mL/Hr) IV Continuous <Continuous>    MEDICATIONS  (PRN):  acetaminophen    Suspension .. 650 milliGRAM(s) Oral every 6 hours PRN Temp greater or equal to 38C (100.4F)    LABS: All Lab data reviewed and analyzed                        10.7   14.78 )-----------( 221      ( 13 Oct 2021 01:37 )             32.3                10-    135  |  99  |  6<L>  ----------------------------<  85  3.3<L>   |  25  |  0.38<L>    Ca    9.5      13 Oct 2021 01:38  Phos  1.9     10-13  Mg     1.6     10-    TPro  7.3  /  Alb  3.7  /  TBili  1.2  /  DBili  x   /  AST  24  /  ALT  24  /  AlkPhos  139<H>  10    Ca    9.4      12 Oct 2021 00:25  Phos  2.4     10-12  Mg     1.6     10-    TPro  7.2  /  Alb  3.6  /  TBili  1.6<H>  /  DBili  x   /  AST  18  /  ALT  18  /  AlkPhos  139<H>  10-    Ca    10.0      11 Oct 2021 00:24  Phos  1.6     10-11  Mg     1.5     10-11    TPro  7.7  /  Alb  4.0  /  TBili  1.4<H>  /  DBili  x   /  AST  19  /  ALT  19  /  AlkPhos  147<H>  10-    Ca    9.8      10 Oct 2021 01:22  Phos  2.2     10-10  Mg     1.4     10-10    TPro  8.1  /  Alb  4.1  /  TBili  1.1  /  DBili  x   /  AST  19  /  ALT  20  /  AlkPhos  157<H>  10-10    Ca    10.6<H>      09 Oct 2021 00:18  Phos  2.6     10-09  Mg     1.7     10-    TPro  8.6<H>  /  Alb  4.4  /  TBili  1.1  /  DBili  x   /  AST  25  /  ALT  17  /  AlkPhos  166<H>  10-    Ca    10.4      08 Oct 2021 00:43  Phos  2.2     10-08  Mg     1.5     10-08    TPro  8.3  /  Alb  4.4  /  TBili  1.2  /  DBili  x   /  AST  17  /  ALT  15  /  AlkPhos  167<H>  10-08                                                                                                                                                                      PTT - ( 2021 04:52 )  PTT:45.2 sec LIVER FUNCTIONS - ( 2021 00:42 )  Alb: 3.4 g/dL / Pro: 6.7 g/dL / ALK PHOS: 213 U/L / ALT: 15 U/L / AST: 24 U/L / GGT: x           RADIOLOGY: - Reviewed and analyzed RT Pig tail cathter  , LVAD HM2, CT scan of abdomen reviewed result noted

## 2021-10-13 NOTE — PROGRESS NOTE ADULT - SUBJECTIVE AND OBJECTIVE BOX
RICKY HAMPTON  MRN-[id number]  Patient is a 66y old  Male who presents with a chief complaint of Anemia, Supratherapeutic INR, Dark Stools (13 Oct 2021 07:04)    HPI:  65M PMH ACC/AHA stage D HF due to NICM HM2 LVAD , TV annuloplasty ring 17 as destination therapy due to severe peripheral artery disease with significant stenosis  SIADH, Depression, CKD-3 with hyperkalemia, past E. coli UTIs, driveline drainage (21) and COVID-19 (back in 2020)  He was recently seen in clinic where he complained of abdominal pain and dark stools w constipation back in May. He presents to [facility] ER today weakness and fatigue, moderate and + Black stools for three days, on coumadin secondary to warfarin use in the setting of an LVAD. Patient has required transfusions for GIB in the past. Mostly recently back in 2021 pt had anemia with dark stools. No interventions was done at that time. However Last Endoscopy was done in 2020 (negative). Today labs show patient is anemic with H/H of 4.5/16.3,. INR is 8.84 MAP in the 90s, Temp 35.1. He denies any chest pain, shortness of breath, dizziness, abd pain, nausea or vomiting.       (2021 16:57)      Surgery/Hospital Course:   admit for melena w/ anemia, INR 8.84   6/15 Capsul study (+) for small bowel bleed, balloon endoscopy (old blood in prox ileum); post EGD - septic w/ L opacity, re-intubated for concern for aspiration, TTE (Mod MR, decrease biV w/ interventricular septum boweing towards R)   bronch    +C Diff    CT C/A/P: Fluid filled colon which may be 2/2 rapid transit. Small bilateral pleffs with associates. Compressive atelectasis New ISABELLE & LLL  parenchymal opacities, suspicious for pneumonia. Moderate stenosis in the proximal superior mesenteric artery.    #8 Shiley trach at bedside    LVAD speed increased to 9200   Bronch   TC since . Patient transferred to SDU.    INR today 2.64.  H&H 7.3/24 this AM.  Will repeat CBC at noon, and will send stool guaiac Patient with persistent abdominal tenderness, rate of tube feeds decreased.  No nausea/vomiting.     INR today 2.4. H&H 9.1/28.6 low flow overnight /N&V, refusing Tube feeds on D5 1/2 normal  @50 cc/hr   INR 2.69  H&H 7.7/.1 refusing Tube feeds on D5 1/2 normal  @50 cc/hr. This am + BM Melena Dr Oneill HF  aware- PRBC x1  GI team consulted -  NPO  plan on study in am-  D/w Dr Cadet Patient  to return to CTU for further management; 1PRBCS    Post op INR 2.2 today.  No bleeding. BC + for SM.  Pt is hypotensive requiring pressor and inotropic support.  ID follow up today on Cefepime will follow.   R PTC for PTX    CT C/A/P: sub q emphysema in R chest wall, GGO RUL, small ascites CTH negative; Abd US: GB thickening, pericholecystic fluid     Perchole drain in place continues to drain total output overnight 133.  Fever today 38.8    duplex LE negative    Patient with persistent abdominal pain, refusing tube feeds and medications, Psych consulted   CTA A/P ordered to r/o mesenteric ischemia 2/2 persistent anorexia, nausea, vomiting. Revealed:  Evaluation of the mesenteric vessels is limited by streak artifact from LVAD. There appears to be severe stenosis of the proximal SMA; abdominal mesenteric doppler is recommended for further evaluation. 2.  No small bowel findings to suggest acute mesenteric ischemia. 3.  Focal dissections involving the right and left common iliac arteries.  8/15: Cultures resulted BC 1/2 +GPC in clusters, SC enterobacter; mesenteric duplex: borderline stenosis of proximal SMA  : CT C/A/P noncon: Nondular opacities in R lung apex w/cavitation, abd nl  :  Continue current care, treatment of thyrotoxicosis with medications as per endocrine, d/c ABX as per team.    RUQ sono: Contracted gallbladder with cholecystostomy tube in place.  9/10 failed SMA stent, c/b RP bleed s/p 3U PRCB   CTA Abd/Pelvis L RP hematoma    Trach decannulated    intubated fro resp distress for increased WOB, LL pneumonia; CT C/A/P: progressive ISABELLE opacities and L consolidations, multifocal pneumonia    TTE (EF 25%, decreased RV, mod MR)   10/3 VT -> Lidocaine gtt   10/4 Trach size 6 DCT cuff  10/5 CT abd (neg for intra-abd abcess, severe stenosis of prox SMA and b/l iliac arteries)    Today:    REVIEW OF SYSTEMS:  Unable to obtain, limited secondary to pts trach     ICU Vital Signs Last 24 Hrs  T(C): 36.9 (13 Oct 2021 04:00), Max: 37.4 (12 Oct 2021 16:00)  T(F): 98.4 (13 Oct 2021 04:00), Max: 99.4 (12 Oct 2021 16:00)  HR: 105 (13 Oct 2021 05:00) (95 - 116)  BP: --  BP(mean): --  ABP: 105/79 (13 Oct 2021 05:00) (88/72 - 106/76)  ABP(mean): 89 (13 Oct 2021 05:00) (82 - 91)  RR: 22 (13 Oct 2021 05:00) (12 - 22)  SpO2: 100% (13 Oct 2021 05:00) (86% - 100%)      Physical Exam:  Gen:  NAD, limited engagement to conversation  CNS: moves all extremities to command   Neck: no JVD, +trach   RES : coarse breath sounds, no wheezing    CVS: +LVAD hum   Abd: Soft. Sybil drain with bilious fluid. Positive BS throughout. Mild RUQ abdominal tenderness by perc sybil.  Skin: No rash, erythema, cyanosis.  Vasc: Warm and well-perfused.  Ext:  no edema    ============================I/O===========================   I&O's Detail    12 Oct 2021 07:01  -  13 Oct 2021 07:00  --------------------------------------------------------  IN:    dextrose 5% + sodium chloride 0.45%: 1000 mL    dextrose 5% + sodium chloride 0.45%: 100 mL    Enteral Tube Flush: 50 mL    IV PiggyBack: 250 mL    IV PiggyBack: 125 mL    sodium chloride 0.9%: 110 mL  Total IN: 1635 mL    OUT:    Drain (mL): 80 mL    Miscellaneous Tube Feedin mL    Voided (mL): 1425 mL  Total OUT: 1505 mL    Total NET: 130 mL        ============================ LABS =========================                        10.7   14.78 )-----------( 221      ( 13 Oct 2021 01:37 )             32.3     10-13    135  |  99  |  6<L>  ----------------------------<  85  3.3<L>   |  25  |  0.38<L>    Ca    9.5      13 Oct 2021 01:38  Phos  1.9     10  Mg     1.6     10-13    TPro  7.3  /  Alb  3.7  /  TBili  1.2  /  DBili  x   /  AST  24  /  ALT  24  /  AlkPhos  139<H>  10-13    LIVER FUNCTIONS - ( 13 Oct 2021 01:38 )  Alb: 3.7 g/dL / Pro: 7.3 g/dL / ALK PHOS: 139 U/L / ALT: 24 U/L / AST: 24 U/L / GGT: x             ABG - ( 13 Oct 2021 01:23 )  pH, Arterial: 7.44  pH, Blood: x     /  pCO2: 45    /  pO2: 143   / HCO3: 31    / Base Excess: 5.7   /  SaO2: 100.0               ======================Micro/Rad/Cardio=================  Culture: Reviewed   CXR: Reviewed  Echo:Reviewed  ======================================================  PAST MEDICAL & SURGICAL HISTORY:  CHF (congestive heart failure)    CAD (coronary artery disease)    Depression    Pleural effusion    History of  novel coronavirus disease (COVID-19)  2020    Hemorrhoids    Bleeding hemorrhoids    Peripheral arterial disease    Claudication    BPH with urinary obstruction    ACC/AHA stage D systolic heart failure    Anticoagulation goal of INR 2.0 to 2.5    Falls    Clavicle fracture    CKD (chronic kidney disease), stage III    Iron deficiency anemia    H/O epistaxis    Vertigo    GI bleed    S/P TVR (tricuspid valve repair)    S/P ventricular assist device    S/P endoscopy      ====================ASSESSMENT ==============  Stage D Nonischemic Cardiomyopathy, Status Post HM2 on 2017    Cardiogenic shock  Hemodynamic instability   Acute hypoxemic respiratory failure s/p trach , decannulated on ; Reintubated on    GI bleed , Status Post Enteroscopy   Anemia, in setting of melena   Chronic Kidney Disease  Stress hyperglycemia   C.diff positive on    Hypovolemic shock  Septic shock  Leukocytosis  GB thickening/percholecystic s/p perc choley by IT   SMA stenosis  Serratia/citrobacter pneumonia   Stenotrophomonas pneumonia   Enterobacter pneumonia   Nasuea/vomiting  Deconditioning     Plan:  ====================== NEUROLOGY=====================  Continue close monitoring of neuro status   PRN Fentanyl and PRN Tylenol for analgesia     acetaminophen    Suspension .. 650 milliGRAM(s) Enteral Tube every 6 hours PRN Mild Pain (1 - 3)  fentaNYL    Injectable 12.5 MICROGram(s) IV Push every 3 hours PRN Moderate to severe pain    ==================== RESPIRATORY======================  Acute hypoxemic respiratory failure s/p #8 shiley trach on ; decannulated on ; Reintubated on ; trach size 6 DCT cuff on 10/4   Continue close monitoring of respiratory rate and breathing pattern, following of ABG’s with A-line monitoring, continuous pulse oximetry monitoring.     Mechanical Ventilation:  Mode: CPAP with PS  FiO2: 40  PEEP: 5  PS: 10  MAP: 9  PIP: 16      ====================CARDIOVASCULAR==================  Stage D Nonischemic Cardiomyopathy, Status Post HM2 on 2017; LVAD settings 9200, flow 5.8  TTE 10/4:  Severely decreased LV systolic function, Diffuse hypokinesis. Mild AR. No pericardial effusion   VT on 10/4, s/p Lidocaine drip, continue close tele monitoring   Holding off on Propranolol for now     ===================HEMATOLOGIC/ONC ===================  CTA A/P on  +L RP hematoma   Acute blood loss anemia, monitor H&H/Plts   Holding coumadin and ASA given recurrent GI bleeding, continue monitoring LDH     ===================== RENAL =========================  Hx of CKD, continue monitoring urine output, I&OS, BUN/Cr   Replete lytes PRN. Keep K> 4 and Mg >2  C/w Aldactone for diuresis    spironolactone 12.5 milliGRAM(s) Oral every 12 hours    ==================== GASTROINTESTINAL===================  TFs held in setting of ongoing nausea / recent emesis   CT A/P 10/5 neg for intra-abd abcess, severe stenosis of prox SMA and b/l iliac arteries  CTA A/P : Evaluation of the mesenteric vessels is limited by streak artifact from LVAD. There appears to be severe stenosis of the proximal SMA; abdominal mesenteric doppler is recommended for further evaluation. 2.  No small bowel findings to suggest acute mesenteric ischemia. 3.  Focal dissections involving the right and left common iliac arteries.  Stenosis of proximal SMA, s/p failed SMA stent c/b RP bleed on 9/10 - Pt deferred decision re: reop to HCP (Sister, Cristina Rudolph), who reportedly states she will consent to surgery. Vascular to f/u with patient today.    Reglan for gut motility    dextrose 5% + sodium chloride 0.45%. 1000 milliLiter(s) (50 mL/Hr) IV Continuous <Continuous>  multivitamin 1 Tablet(s) Oral daily  GI prophylaxis, pantoprazole  Injectable 40 milliGRAM(s) IV Push every 12 hours  simethicone 80 milliGRAM(s) Chew every 8 hours PRN Gas  sodium chloride 0.9%. 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       Per endocrine      - c/w Methimazole, adjust based on labs        - Check Free T4 and total T3       - c/w Prednisone     insulin lispro (ADMELOG) corrective regimen sliding scale   SubCutaneous every 6 hours  methimazole 20 milliGRAM(s) Oral <User Schedule>  predniSONE   Tablet 30 milliGRAM(s) Oral daily    ========================INFECTIOUS DISEASE================  Afebrile, WBC rising 13.13->14.78   Continue trending WBC and monitoring fever curve   CT C/A/P : progressive ISABELLE opacities and L consolidations, multifocal pneumonia  BCx  + Serratia marcescens, BCx 1/2 on 10/1 +Enterobacter cloacae complex, c/w Meropenem - to be completed 10/15   BCx 10/5 + 10/7 NGTD   Vancomycin solution for C. diff prophylaxis    meropenem  IVPB 1000 milliGRAM(s) IV Intermittent every 8 hours  vancomycin    Solution 125 milliGRAM(s) Oral every 12 hours        Patient requires continuous monitoring with bedside rhythm monitoring, pulse ox monitoring, and intermittent blood gas analysis. Care plan discussed with ICU care team. Patient remained critical and at risk for life threatening decompensation.     By signing my name below, IAgnieszka, attest that this documentation has been prepared under the direction and in the presence of CLAUDIA Burleson   Electronically signed: Cesia Shaffer, 10-13-21 @ 07:40    I, Anastasiya Moulton, personally performed the services described in this documentation. all medical record entries made by the luisibmel were at my direction and in my presence. I have reviewed the chart and agree that the record reflects my personal performance and is accurate and complete  Electronically signed: CLAUDIA Burleson        RICKY HAMPTON  MRN-[id number]  Patient is a 66y old  Male who presents with a chief complaint of Anemia, Supratherapeutic INR, Dark Stools (13 Oct 2021 07:04)    HPI:  65M PMH ACC/AHA stage D HF due to NICM HM2 LVAD , TV annuloplasty ring 17 as destination therapy due to severe peripheral artery disease with significant stenosis  SIADH, Depression, CKD-3 with hyperkalemia, past E. coli UTIs, driveline drainage (21) and COVID-19 (back in 2020)  He was recently seen in clinic where he complained of abdominal pain and dark stools w constipation back in May. He presents to [facility] ER today weakness and fatigue, moderate and + Black stools for three days, on coumadin secondary to warfarin use in the setting of an LVAD. Patient has required transfusions for GIB in the past. Mostly recently back in 2021 pt had anemia with dark stools. No interventions was done at that time. However Last Endoscopy was done in 2020 (negative). Today labs show patient is anemic with H/H of 4.5/16.3,. INR is 8.84 MAP in the 90s, Temp 35.1. He denies any chest pain, shortness of breath, dizziness, abd pain, nausea or vomiting.       (2021 16:57)      Surgery/Hospital Course:   admit for melena w/ anemia, INR 8.84   6/15 Capsul study (+) for small bowel bleed, balloon endoscopy (old blood in prox ileum); post EGD - septic w/ L opacity, re-intubated for concern for aspiration, TTE (Mod MR, decrease biV w/ interventricular septum boweing towards R)   bronch    +C Diff    CT C/A/P: Fluid filled colon which may be 2/2 rapid transit. Small bilateral pleffs with associates. Compressive atelectasis New ISABELLE & LLL  parenchymal opacities, suspicious for pneumonia. Moderate stenosis in the proximal superior mesenteric artery.    #8 Shiley trach at bedside    LVAD speed increased to 9200   Bronch   TC since . Patient transferred to SDU.    INR today 2.64.  H&H 7.3/24 this AM.  Will repeat CBC at noon, and will send stool guaiac Patient with persistent abdominal tenderness, rate of tube feeds decreased.  No nausea/vomiting.     INR today 2.4. H&H 9.1/28.6 low flow overnight /N&V, refusing Tube feeds on D5 1/2 normal  @50 cc/hr   INR 2.69  H&H 7.7/.1 refusing Tube feeds on D5 1/2 normal  @50 cc/hr. This am + BM Melena Dr Oneill HF  aware- PRBC x1  GI team consulted -  NPO  plan on study in am-  D/w Dr Cadet Patient  to return to CTU for further management; 1PRBCS    Post op INR 2.2 today.  No bleeding. BC + for SM.  Pt is hypotensive requiring pressor and inotropic support.  ID follow up today on Cefepime will follow.   R PTC for PTX    CT C/A/P: sub q emphysema in R chest wall, GGO RUL, small ascites CTH negative; Abd US: GB thickening, pericholecystic fluid     Perchole drain in place continues to drain total output overnight 133.  Fever today 38.8    duplex LE negative    Patient with persistent abdominal pain, refusing tube feeds and medications, Psych consulted   CTA A/P ordered to r/o mesenteric ischemia 2/2 persistent anorexia, nausea, vomiting. Revealed:  Evaluation of the mesenteric vessels is limited by streak artifact from LVAD. There appears to be severe stenosis of the proximal SMA; abdominal mesenteric doppler is recommended for further evaluation. 2.  No small bowel findings to suggest acute mesenteric ischemia. 3.  Focal dissections involving the right and left common iliac arteries.  8/15: Cultures resulted BC 1/2 +GPC in clusters, SC enterobacter; mesenteric duplex: borderline stenosis of proximal SMA  : CT C/A/P noncon: Nondular opacities in R lung apex w/cavitation, abd nl  :  Continue current care, treatment of thyrotoxicosis with medications as per endocrine, d/c ABX as per team.    RUQ sono: Contracted gallbladder with cholecystostomy tube in place.  9/10 failed SMA stent, c/b RP bleed s/p 3U PRCB   CTA Abd/Pelvis L RP hematoma    Trach decannulated    intubated fro resp distress for increased WOB, LL pneumonia; CT C/A/P: progressive ISABELLE opacities and L consolidations, multifocal pneumonia    TTE (EF 25%, decreased RV, mod MR)   10/3 VT -> Lidocaine gtt   10/4 Trach size 6 DCT cuff  10/5 CT abd (neg for intra-abd abcess, severe stenosis of prox SMA and b/l iliac arteries)    Today:  Pt deferred decision re: reop to HCP (Sister, Cristina Rudolph), who reportedly states she will consent to surgery, vascular to follow up.     REVIEW OF SYSTEMS:  Unable to obtain, limited secondary to pts trach     ICU Vital Signs Last 24 Hrs  T(C): 36.9 (13 Oct 2021 04:00), Max: 37.4 (12 Oct 2021 16:00)  T(F): 98.4 (13 Oct 2021 04:00), Max: 99.4 (12 Oct 2021 16:00)  HR: 105 (13 Oct 2021 05:00) (95 - 116)  BP: --  BP(mean): --  ABP: 105/79 (13 Oct 2021 05:00) (88/72 - 106/76)  ABP(mean): 89 (13 Oct 2021 05:00) (82 - 91)  RR: 22 (13 Oct 2021 05:00) (12 - 22)  SpO2: 100% (13 Oct 2021 05:00) (86% - 100%)      Physical Exam:  Gen:  NAD, limited engagement to conversation  CNS: moves all extremities to command   Neck: no JVD, +trach   RES : coarse breath sounds, no wheezing    CVS: +LVAD hum   Abd: Soft. Sybil drain with bilious fluid. Positive BS throughout. Mild RUQ abdominal tenderness by perc sybil.  Skin: No rash, erythema, cyanosis.  Vasc: Warm and well-perfused.  Ext:  no edema    ============================I/O===========================   I&O's Detail    12 Oct 2021 07:01  -  13 Oct 2021 07:00  --------------------------------------------------------  IN:    dextrose 5% + sodium chloride 0.45%: 1000 mL    dextrose 5% + sodium chloride 0.45%: 100 mL    Enteral Tube Flush: 50 mL    IV PiggyBack: 250 mL    IV PiggyBack: 125 mL    sodium chloride 0.9%: 110 mL  Total IN: 1635 mL    OUT:    Drain (mL): 80 mL    Miscellaneous Tube Feedin mL    Voided (mL): 1425 mL  Total OUT: 1505 mL    Total NET: 130 mL        ============================ LABS =========================                        10.7   14.78 )-----------( 221      ( 13 Oct 2021 01:37 )             32.3     10-    135  |  99  |  6<L>  ----------------------------<  85  3.3<L>   |  25  |  0.38<L>    Ca    9.5      13 Oct 2021 01:38  Phos  1.9     10  Mg     1.6     10-    TPro  7.3  /  Alb  3.7  /  TBili  1.2  /  DBili  x   /  AST  24  /  ALT  24  /  AlkPhos  139<H>  10-13    LIVER FUNCTIONS - ( 13 Oct 2021 01:38 )  Alb: 3.7 g/dL / Pro: 7.3 g/dL / ALK PHOS: 139 U/L / ALT: 24 U/L / AST: 24 U/L / GGT: x             ABG - ( 13 Oct 2021 01:23 )  pH, Arterial: 7.44  pH, Blood: x     /  pCO2: 45    /  pO2: 143   / HCO3: 31    / Base Excess: 5.7   /  SaO2: 100.0               ======================Micro/Rad/Cardio=================  Culture: Reviewed   CXR: Reviewed  Echo:Reviewed  ======================================================  PAST MEDICAL & SURGICAL HISTORY:  CHF (congestive heart failure)    CAD (coronary artery disease)    Depression    Pleural effusion    History of  novel coronavirus disease (COVID-19)  2020    Hemorrhoids    Bleeding hemorrhoids    Peripheral arterial disease    Claudication    BPH with urinary obstruction    ACC/AHA stage D systolic heart failure    Anticoagulation goal of INR 2.0 to 2.5    Falls    Clavicle fracture    CKD (chronic kidney disease), stage III    Iron deficiency anemia    H/O epistaxis    Vertigo    GI bleed    S/P TVR (tricuspid valve repair)    S/P ventricular assist device    S/P endoscopy      ====================ASSESSMENT ==============  Stage D Nonischemic Cardiomyopathy, Status Post HM2 on 2017    Cardiogenic shock  Hemodynamic instability   Acute hypoxemic respiratory failure s/p trach , decannulated on ; Reintubated on    GI bleed , Status Post Enteroscopy   Anemia, in setting of melena   Chronic Kidney Disease  Stress hyperglycemia   C.diff positive on    Hypovolemic shock  Septic shock  Leukocytosis  GB thickening/percholecystic s/p perc sybil by IR   SMA stenosis  Serratia/citrobacter pneumonia   Stenotrophomonas pneumonia   Enterobacter pneumonia   Nasuea/vomiting  Deconditioning     Plan:  ====================== NEUROLOGY=====================  Continue close monitoring of neuro status   PRN Fentanyl and PRN Tylenol for analgesia     acetaminophen    Suspension .. 650 milliGRAM(s) Enteral Tube every 6 hours PRN Mild Pain (1 - 3)  fentaNYL    Injectable 12.5 MICROGram(s) IV Push every 3 hours PRN Moderate to severe pain    ==================== RESPIRATORY======================  Acute hypoxemic respiratory failure s/p #8 shiley trach on ; decannulated on ; Reintubated on ; trach size 6 DCT cuff on 10/4   Continue close monitoring of respiratory rate and breathing pattern, following of ABG’s with A-line monitoring, continuous pulse oximetry monitoring.     Mechanical Ventilation:  Mode: CPAP with PS  FiO2: 40  PEEP: 5  PS: 10  MAP: 9  PIP: 16      ====================CARDIOVASCULAR==================  Stage D Nonischemic Cardiomyopathy, Status Post HM2 on 2017; LVAD settings 9200, flow 5.8  TTE 10/4:  Severely decreased LV systolic function, Diffuse hypokinesis. Mild AR. No pericardial effusion   VT on 10/4, s/p Lidocaine drip, continue close tele monitoring   Holding off on Propranolol for now     ===================HEMATOLOGIC/ONC ===================  CTA A/P on  +L RP hematoma   Acute blood loss anemia, monitor H&H/Plts   Holding coumadin and ASA given recurrent GI bleeding, continue monitoring LDH     ===================== RENAL =========================  Hx of CKD, continue monitoring urine output, I&OS, BUN/Cr   Replete lytes PRN. Keep K> 4 and Mg >2  C/w Aldactone for diuresis    spironolactone 12.5 milliGRAM(s) Oral every 12 hours    ==================== GASTROINTESTINAL===================  TFs held in setting of ongoing nausea / recent emesis   CT A/P 10/5 neg for intra-abd abcess, severe stenosis of prox SMA and b/l iliac arteries  CTA A/P : Evaluation of the mesenteric vessels is limited by streak artifact from LVAD. There appears to be severe stenosis of the proximal SMA; abdominal mesenteric doppler is recommended for further evaluation. 2.  No small bowel findings to suggest acute mesenteric ischemia. 3.  Focal dissections involving the right and left common iliac arteries.  Stenosis of proximal SMA, s/p failed SMA stent c/b RP bleed on 9/10 - Pt deferred decision re: reop to HCP (Sister, Cristina Rudolph), who reportedly states she will consent to surgery, vascular to f/u   Reglan for gut motility    dextrose 5% + sodium chloride 0.45%. 1000 milliLiter(s) (50 mL/Hr) IV Continuous <Continuous>  multivitamin 1 Tablet(s) Oral daily  GI prophylaxis, pantoprazole  Injectable 40 milliGRAM(s) IV Push every 12 hours  simethicone 80 milliGRAM(s) Chew every 8 hours PRN Gas  sodium chloride 0.9%. 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       Per endocrine      - c/w Methimazole, adjust based on labs        - Check Free T4 and total T3       - c/w Prednisone     insulin lispro (ADMELOG) corrective regimen sliding scale   SubCutaneous every 6 hours  methimazole 20 milliGRAM(s) Oral <User Schedule>  predniSONE   Tablet 30 milliGRAM(s) Oral daily    ========================INFECTIOUS DISEASE================  Afebrile, WBC rising 13.13->14.78   Continue trending WBC and monitoring fever curve   CT C/A/P : progressive ISABELLE opacities and L consolidations, multifocal pneumonia  BCx  + Serratia marcescens, BCx 1/2 on 10/1 +Enterobacter cloacae complex, c/w Meropenem - to be completed 10/15   BCx 10/5 + 10/7 NGTD   Vancomycin solution for C. diff prophylaxis    meropenem  IVPB 1000 milliGRAM(s) IV Intermittent every 8 hours  vancomycin    Solution 125 milliGRAM(s) Oral every 12 hours        Patient requires continuous monitoring with bedside rhythm monitoring, pulse ox monitoring, and intermittent blood gas analysis. Care plan discussed with ICU care team. Patient remained critical and at risk for life threatening decompensation.     By signing my name below, I, Agnieszka Cherry, attest that this documentation has been prepared under the direction and in the presence of CLAUDIA Burleson   Electronically signed: Cesia Shaffer, 10-13-21 @ 07:40    I, Anastasiya Moulton, personally performed the services described in this documentation. all medical record entries made by the luisibmel were at my direction and in my presence. I have reviewed the chart and agree that the record reflects my personal performance and is accurate and complete  Electronically signed: CLAUDIA Burleson        RICKY HAMPTON  MRN-[id number]  Patient is a 66y old  Male who presents with a chief complaint of Anemia, Supratherapeutic INR, Dark Stools (13 Oct 2021 07:04)    HPI:  65M PMH ACC/AHA stage D HF due to NICM HM2 LVAD , TV annuloplasty ring 17 as destination therapy due to severe peripheral artery disease with significant stenosis  SIADH, Depression, CKD-3 with hyperkalemia, past E. coli UTIs, driveline drainage (21) and COVID-19 (back in 2020)  He was recently seen in clinic where he complained of abdominal pain and dark stools w constipation back in May. He presents to [facility] ER today weakness and fatigue, moderate and + Black stools for three days, on coumadin secondary to warfarin use in the setting of an LVAD. Patient has required transfusions for GIB in the past. Mostly recently back in 2021 pt had anemia with dark stools. No interventions was done at that time. However Last Endoscopy was done in 2020 (negative). Today labs show patient is anemic with H/H of 4.5/16.3,. INR is 8.84 MAP in the 90s, Temp 35.1. He denies any chest pain, shortness of breath, dizziness, abd pain, nausea or vomiting.       (2021 16:57)      Surgery/Hospital Course:   admit for melena w/ anemia, INR 8.84   6/15 Capsul study (+) for small bowel bleed, balloon endoscopy (old blood in prox ileum); post EGD - septic w/ L opacity, re-intubated for concern for aspiration, TTE (Mod MR, decrease biV w/ interventricular septum boweing towards R)   bronch    +C Diff    CT C/A/P: Fluid filled colon which may be 2/2 rapid transit. Small bilateral pleffs with associates. Compressive atelectasis New ISABELLE & LLL  parenchymal opacities, suspicious for pneumonia. Moderate stenosis in the proximal superior mesenteric artery.    #8 Shiley trach at bedside    LVAD speed increased to 9200   Bronch   TC since . Patient transferred to SDU.    INR today 2.64.  H&H 7.3/24 this AM.  Will repeat CBC at noon, and will send stool guaiac Patient with persistent abdominal tenderness, rate of tube feeds decreased.  No nausea/vomiting.     INR today 2.4. H&H 9.1/28.6 low flow overnight /N&V, refusing Tube feeds on D5 1/2 normal  @50 cc/hr   INR 2.69  H&H 7.7/.1 refusing Tube feeds on D5 1/2 normal  @50 cc/hr. This am + BM Melena Dr Oneill HF  aware- PRBC x1  GI team consulted -  NPO  plan on study in am-  D/w Dr Cadet Patient  to return to CTU for further management; 1PRBCS    Post op INR 2.2 today.  No bleeding. BC + for SM.  Pt is hypotensive requiring pressor and inotropic support.  ID follow up today on Cefepime will follow.   R PTC for PTX    CT C/A/P: sub q emphysema in R chest wall, GGO RUL, small ascites CTH negative; Abd US: GB thickening, pericholecystic fluid     Perchole drain in place continues to drain total output overnight 133.  Fever today 38.8    duplex LE negative    Patient with persistent abdominal pain, refusing tube feeds and medications, Psych consulted   CTA A/P ordered to r/o mesenteric ischemia 2/2 persistent anorexia, nausea, vomiting. Revealed:  Evaluation of the mesenteric vessels is limited by streak artifact from LVAD. There appears to be severe stenosis of the proximal SMA; abdominal mesenteric doppler is recommended for further evaluation. 2.  No small bowel findings to suggest acute mesenteric ischemia. 3.  Focal dissections involving the right and left common iliac arteries.  8/15: Cultures resulted BC 1/2 +GPC in clusters, SC enterobacter; mesenteric duplex: borderline stenosis of proximal SMA  : CT C/A/P noncon: Nondular opacities in R lung apex w/cavitation, abd nl  :  Continue current care, treatment of thyrotoxicosis with medications as per endocrine, d/c ABX as per team.    RUQ sono: Contracted gallbladder with cholecystostomy tube in place.  9/10 failed SMA stent, c/b RP bleed s/p 3U PRCB   CTA Abd/Pelvis L RP hematoma    Trach decannulated    intubated fro resp distress for increased WOB, LL pneumonia; CT C/A/P: progressive ISABELLE opacities and L consolidations, multifocal pneumonia    TTE (EF 25%, decreased RV, mod MR)   10/3 VT -> Lidocaine gtt   10/4 Trach size 6 DCT cuff  10/5 CT abd (neg for intra-abd abcess, severe stenosis of prox SMA and b/l iliac arteries)    Today:  Pt deferred decision re: reop to HCP (Sister, Cristina Rudolph), who reportedly states she will consent to surgery, vascular to follow up. CPAP vent weaning as tolerated. Ambulate/PT.      REVIEW OF SYSTEMS:  Unable to obtain, limited secondary to pts trach     ICU Vital Signs Last 24 Hrs  T(C): 36.9 (13 Oct 2021 04:00), Max: 37.4 (12 Oct 2021 16:00)  T(F): 98.4 (13 Oct 2021 04:00), Max: 99.4 (12 Oct 2021 16:00)  HR: 105 (13 Oct 2021 05:00) (95 - 116)  BP: --  BP(mean): --  ABP: 105/79 (13 Oct 2021 05:00) (88/72 - 106/76)  ABP(mean): 89 (13 Oct 2021 05:00) (82 - 91)  RR: 22 (13 Oct 2021 05:00) (12 - 22)  SpO2: 100% (13 Oct 2021 05:00) (86% - 100%)      Physical Exam:  Gen:  NAD, limited engagement to conversation  CNS: moves all extremities to command   Neck: no JVD, +trach   RES : coarse breath sounds, no wheezing    CVS: +LVAD hum   Abd: Soft. Sybil drain with bilious fluid. Positive BS throughout. Mild RUQ abdominal tenderness by perc sybil.  Skin: No rash, erythema, cyanosis.  Vasc: Warm and well-perfused.  Ext:  no edema    ============================I/O===========================   I&O's Detail    12 Oct 2021 07:01  -  13 Oct 2021 07:00  --------------------------------------------------------  IN:    dextrose 5% + sodium chloride 0.45%: 1000 mL    dextrose 5% + sodium chloride 0.45%: 100 mL    Enteral Tube Flush: 50 mL    IV PiggyBack: 250 mL    IV PiggyBack: 125 mL    sodium chloride 0.9%: 110 mL  Total IN: 1635 mL    OUT:    Drain (mL): 80 mL    Miscellaneous Tube Feedin mL    Voided (mL): 1425 mL  Total OUT: 1505 mL    Total NET: 130 mL        ============================ LABS =========================                        10.7   14.78 )-----------( 221      ( 13 Oct 2021 01:37 )             32.3     10-13    135  |  99  |  6<L>  ----------------------------<  85  3.3<L>   |  25  |  0.38<L>    Ca    9.5      13 Oct 2021 01:38  Phos  1.9     10-13  Mg     1.6     10-    TPro  7.3  /  Alb  3.7  /  TBili  1.2  /  DBili  x   /  AST  24  /  ALT  24  /  AlkPhos  139<H>  1013    LIVER FUNCTIONS - ( 13 Oct 2021 01:38 )  Alb: 3.7 g/dL / Pro: 7.3 g/dL / ALK PHOS: 139 U/L / ALT: 24 U/L / AST: 24 U/L / GGT: x             ABG - ( 13 Oct 2021 01:23 )  pH, Arterial: 7.44  pH, Blood: x     /  pCO2: 45    /  pO2: 143   / HCO3: 31    / Base Excess: 5.7   /  SaO2: 100.0               ======================Micro/Rad/Cardio=================  Culture: Reviewed   CXR: Reviewed  Echo:Reviewed  ======================================================  PAST MEDICAL & SURGICAL HISTORY:  CHF (congestive heart failure)    CAD (coronary artery disease)    Depression    Pleural effusion    History of  novel coronavirus disease (COVID-19)  2020    Hemorrhoids    Bleeding hemorrhoids    Peripheral arterial disease    Claudication    BPH with urinary obstruction    ACC/AHA stage D systolic heart failure    Anticoagulation goal of INR 2.0 to 2.5    Falls    Clavicle fracture    CKD (chronic kidney disease), stage III    Iron deficiency anemia    H/O epistaxis    Vertigo    GI bleed    S/P TVR (tricuspid valve repair)    S/P ventricular assist device    S/P endoscopy      ====================ASSESSMENT ==============  Stage D Nonischemic Cardiomyopathy, Status Post HM2 on 2017    Cardiogenic shock  Hemodynamic instability   Acute hypoxemic respiratory failure s/p trach , decannulated on ; Reintubated on    GI bleed , Status Post Enteroscopy   Anemia, in setting of melena   Chronic Kidney Disease  Stress hyperglycemia   C.diff positive on    Hypovolemic shock  Septic shock  Leukocytosis  GB thickening/percholecystic s/p perc sybil by IR   SMA stenosis  Serratia/citrobacter pneumonia   Stenotrophomonas pneumonia   Enterobacter pneumonia   Nasuea/vomiting  Deconditioning     Plan:  ====================== NEUROLOGY=====================  Continue close monitoring of neuro status   PRN Fentanyl and PRN Tylenol for analgesia   Ambulate/PT as tolerated     acetaminophen    Suspension .. 650 milliGRAM(s) Enteral Tube every 6 hours PRN Mild Pain (1 - 3)  fentaNYL    Injectable 12.5 MICROGram(s) IV Push every 3 hours PRN Moderate to severe pain    ==================== RESPIRATORY======================  Acute hypoxemic respiratory failure s/p #8 shiley trach on ; decannulated on ; Reintubated on ; trach size 6 DCT cuff on 10/4   Continue close monitoring of respiratory rate and breathing pattern, following of ABG’s with A-line monitoring, continuous pulse oximetry monitoring.   CPAP vent weaning as tolerated     Mechanical Ventilation:  Mode: CPAP with PS  FiO2: 40  PEEP: 5  PS: 10  MAP: 9  PIP: 16      ====================CARDIOVASCULAR==================  Stage D Nonischemic Cardiomyopathy, Status Post HM2 on 2017; LVAD settings 9200, flow 5.8  TTE 10/4:  Severely decreased LV systolic function, Diffuse hypokinesis. Mild AR. No pericardial effusion   VT on 10/4, s/p Lidocaine drip, continue close tele monitoring   Holding off on Propranolol for now     ===================HEMATOLOGIC/ONC ===================  CTA A/P on  +L RP hematoma   Acute blood loss anemia, monitor H&H/Plts   Holding coumadin and ASA given recurrent GI bleeding, continue monitoring LDH     ===================== RENAL =========================  Hx of CKD, continue monitoring urine output, I&OS, BUN/Cr   Replete lytes PRN. Keep K> 4 and Mg >2  C/w Aldactone for diuresis    spironolactone 12.5 milliGRAM(s) Oral every 12 hours    ==================== GASTROINTESTINAL===================  TFs held in setting of ongoing nausea / recent emesis   CT A/P 10/5 neg for intra-abd abcess, severe stenosis of prox SMA and b/l iliac arteries  CTA A/P : Evaluation of the mesenteric vessels is limited by streak artifact from LVAD. There appears to be severe stenosis of the proximal SMA; abdominal mesenteric doppler is recommended for further evaluation. 2.  No small bowel findings to suggest acute mesenteric ischemia. 3.  Focal dissections involving the right and left common iliac arteries.  Stenosis of proximal SMA, s/p failed SMA stent c/b RP bleed on 9/10 - Pt deferred decision re: reop to HCP (Sister, Cristina Rudolph), who reportedly states she will consent to surgery, vascular to f/u   Reglan for gut motility    dextrose 5% + sodium chloride 0.45%. 1000 milliLiter(s) (50 mL/Hr) IV Continuous <Continuous>  multivitamin 1 Tablet(s) Oral daily  GI prophylaxis, pantoprazole  Injectable 40 milliGRAM(s) IV Push every 12 hours  simethicone 80 milliGRAM(s) Chew every 8 hours PRN Gas  sodium chloride 0.9%. 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       Per endocrine      - c/w Methimazole, adjust based on labs        - Check Free T4 and total T3       - c/w Prednisone     insulin lispro (ADMELOG) corrective regimen sliding scale   SubCutaneous every 6 hours  methimazole 20 milliGRAM(s) Oral <User Schedule>  predniSONE   Tablet 30 milliGRAM(s) Oral daily    ========================INFECTIOUS DISEASE================  Afebrile, WBC rising 13.13->14.78   Continue trending WBC and monitoring fever curve   CT C/A/P : progressive ISABELLE opacities and L consolidations, multifocal pneumonia  BCx  + Serratia marcescens, BCx 1/2 on 10/1 +Enterobacter cloacae complex, c/w Meropenem - to be completed 10/15   BCx 10/5 + 10/7 NGTD   Vancomycin solution for C. diff prophylaxis    meropenem  IVPB 1000 milliGRAM(s) IV Intermittent every 8 hours  vancomycin    Solution 125 milliGRAM(s) Oral every 12 hours        Patient requires continuous monitoring with bedside rhythm monitoring, pulse ox monitoring, and intermittent blood gas analysis. Care plan discussed with ICU care team. Patient remained critical and at risk for life threatening decompensation.     By signing my name below, I, Agnieszka Cherry, attest that this documentation has been prepared under the direction and in the presence of CLAUDIA Burleson   Electronically signed: Romel Shaffer, 10-13-21 @ 07:40    I, Anastasiya Moulton, personally performed the services described in this documentation. all medical record entries made by the romel were at my direction and in my presence. I have reviewed the chart and agree that the record reflects my personal performance and is accurate and complete  Electronically signed: CLAUDIA Burleson

## 2021-10-14 NOTE — PROGRESS NOTE ADULT - SUBJECTIVE AND OBJECTIVE BOX
RICKY HAMPTON  MRN-[id number]  Patient is a 66y old  Male who presents with a chief complaint of Anemia, Supratherapeutic INR, Dark Stools (14 Oct 2021 07:29)    HPI:  65M PMH ACC/AHA stage D HF due to NICM HM2 LVAD , TV annuloplasty ring 17 as destination therapy due to severe peripheral artery disease with significant stenosis  SIADH, Depression, CKD-3 with hyperkalemia, past E. coli UTIs, driveline drainage (21) and COVID-19 (back in 2020)  He was recently seen in clinic where he complained of abdominal pain and dark stools w constipation back in May. He presents to [facility] ER today weakness and fatigue, moderate and + Black stools for three days, on coumadin secondary to warfarin use in the setting of an LVAD. Patient has required transfusions for GIB in the past. Mostly recently back in 2021 pt had anemia with dark stools. No interventions was done at that time. However Last Endoscopy was done in 2020 (negative). Today labs show patient is anemic with H/H of 4.5/16.3,. INR is 8.84 MAP in the 90s, Temp 35.1. He denies any chest pain, shortness of breath, dizziness, abd pain, nausea or vomiting.       (2021 16:57)      Surgery/Hospital Course:   admit for melena w/ anemia, INR 8.84   6/15 Capsul study (+) for small bowel bleed, balloon endoscopy (old blood in prox ileum); post EGD - septic w/ L opacity, re-intubated for concern for aspiration, TTE (Mod MR, decrease biV w/ interventricular septum boweing towards R)   bronch    +C Diff    CT C/A/P: Fluid filled colon which may be 2/2 rapid transit. Small bilateral pleffs with associates. Compressive atelectasis New ISABELLE & LLL  parenchymal opacities, suspicious for pneumonia. Moderate stenosis in the proximal superior mesenteric artery.    #8 Shiley trach at bedside    LVAD speed increased to 9200   Bronch   TC since . Patient transferred to SDU.    INR today 2.64.  H&H 7.3/24 this AM.  Will repeat CBC at noon, and will send stool guaiac Patient with persistent abdominal tenderness, rate of tube feeds decreased.  No nausea/vomiting.     INR today 2.4. H&H 9.1/28.6 low flow overnight /N&V, refusing Tube feeds on D5 1/2 normal  @50 cc/hr   INR 2.69  H&H 7.7/.1 refusing Tube feeds on D5 1/2 normal  @50 cc/hr. This am + BM Melena Dr Oneill HF  aware- PRBC x1  GI team consulted -  NPO  plan on study in am-  D/w Dr Cadet Patient  to return to CTU for further management; 1PRBCS    Post op INR 2.2 today.  No bleeding. BC + for SM.  Pt is hypotensive requiring pressor and inotropic support.  ID follow up today on Cefepime will follow.   R PTC for PTX    CT C/A/P: sub q emphysema in R chest wall, GGO RUL, small ascites CTH negative; Abd US: GB thickening, pericholecystic fluid     Perchole drain in place continues to drain total output overnight 133.  Fever today 38.8    duplex LE negative    Patient with persistent abdominal pain, refusing tube feeds and medications, Psych consulted   CTA A/P ordered to r/o mesenteric ischemia 2/2 persistent anorexia, nausea, vomiting. Revealed:  Evaluation of the mesenteric vessels is limited by streak artifact from LVAD. There appears to be severe stenosis of the proximal SMA; abdominal mesenteric doppler is recommended for further evaluation. 2.  No small bowel findings to suggest acute mesenteric ischemia. 3.  Focal dissections involving the right and left common iliac arteries.  8/15: Cultures resulted BC 1/2 +GPC in clusters, SC enterobacter; mesenteric duplex: borderline stenosis of proximal SMA  : CT C/A/P noncon: Nondular opacities in R lung apex w/cavitation, abd nl  :  Continue current care, treatment of thyrotoxicosis with medications as per endocrine, d/c ABX as per team.    RUQ sono: Contracted gallbladder with cholecystostomy tube in place.  9/10 failed SMA stent, c/b RP bleed s/p 3U PRCB   CTA Abd/Pelvis L RP hematoma    Trach decannulated    intubated fro resp distress for increased WOB, LL pneumonia; CT C/A/P: progressive ISABELLE opacities and L consolidations, multifocal pneumonia    TTE (EF 25%, decreased RV, mod MR)   10/3 VT -> Lidocaine gtt   10/4 Trach size 6 DCT cuff  10/5 CT abd (neg for intra-abd abcess, severe stenosis of prox SMA and b/l iliac arteries)    Today:  Pt developed fever TMAX 102.6F overnight. Blood cx was sent. Will follow up on results and start on IV vanco. Pt was as well refusing central line removal overnight. Will attempt to remove central line today. Will set up family meeting to discuss further care of pt as pt desires to give his medical decision to his family.     REVIEW OF SYSTEMS:  Unable to obtain, pt's trach    ICU Vital Signs Last 24 Hrs  T(C): 36.9 (14 Oct 2021 08:00), Max: 39.1 (14 Oct 2021 00:00)  T(F): 98.5 (14 Oct 2021 08:00), Max: 102.4 (14 Oct 2021 00:00)  HR: 76 (14 Oct 2021 13:00) (76 - 147)  BP: --  BP(mean): --  ABP: 82/65 (14 Oct 2021 13:00) (78/62 - 193/190)  ABP(mean): 73 (14 Oct 2021 13:00) (70 - 192)  RR: 14 (14 Oct 2021 13:00) (12 - 37)  SpO2: 100% (14 Oct 2021 13:00) (92% - 100%)      Physical Exam:  Gen:  NAD, limited engagement to conversation  CNS: moves all extremities to command   Neck: no JVD, +trach   RES : coarse breath sounds, no wheezing    CVS: +LVAD hum   Abd: Soft. Sybil drain with bilious fluid. Positive BS throughout. Mild RUQ abdominal tenderness by perc sybil.  Skin: No rash, erythema, cyanosis.  Vasc: Warm and well-perfused.  Ext:  no edema    ============================I/O===========================   I&O's Detail    13 Oct 2021 07:01  -  14 Oct 2021 07:00  --------------------------------------------------------  IN:    Albumin 5%  - 250 mL: 500 mL    dextrose 5% + sodium chloride 0.45%: 500 mL    dextrose 5% + sodium chloride 0.45% w/ Additives: 650 mL    Enteral Tube Flush: 100 mL    IV PiggyBack: 50 mL    IV PiggyBack: 100 mL    IV PiggyBack: 187.5 mL    sodium chloride 0.9%: 115 mL  Total IN: 2202.5 mL    OUT:    Drain (mL): 210 mL    Emesis (mL): 50 mL    Miscellaneous Tube Feedin mL    Nasogastric/Oral tube (mL): 0 mL    Voided (mL): 1050 mL  Total OUT: 1310 mL    Total NET: 892.5 mL      14 Oct 2021 07:01  -  14 Oct 2021 13:13  --------------------------------------------------------  IN:    dextrose 5% + sodium chloride 0.45% w/ Additives: 300 mL    Enteral Tube Flush: 60 mL    IV PiggyBack: 62.5 mL    IV PiggyBack: 250 mL    sodium chloride 0.9%: 10 mL  Total IN: 682.5 mL    OUT:    Voided (mL): 1025 mL  Total OUT: 1025 mL    Total NET: -342.5 mL        ============================ LABS =========================                        10.5   15.51 )-----------( 174      ( 14 Oct 2021 01:11 )             32.7     10-14    133<L>  |  97  |  6<L>  ----------------------------<  87  3.7   |  24  |  0.40<L>    Ca    9.3      14 Oct 2021 01:11  Phos  1.6     10-14  Mg     1.3     10-14    TPro  7.5  /  Alb  3.8  /  TBili  1.5<H>  /  DBili  x   /  AST  41<H>  /  ALT  39  /  AlkPhos  149<H>  10-14    LIVER FUNCTIONS - ( 14 Oct 2021 01:11 )  Alb: 3.8 g/dL / Pro: 7.5 g/dL / ALK PHOS: 149 U/L / ALT: 39 U/L / AST: 41 U/L / GGT: x             ABG - ( 14 Oct 2021 01:01 )  pH, Arterial: 7.47  pH, Blood: x     /  pCO2: 39    /  pO2: 174   / HCO3: 28    / Base Excess: 4.4   /  SaO2: 100.0               ======================Micro/Rad/Cardio=================  Culture: Reviewed   CXR: Reviewed  Echo:Reviewed  ======================================================  PAST MEDICAL & SURGICAL HISTORY:  CHF (congestive heart failure)    CAD (coronary artery disease)    Depression    Pleural effusion    History of 2019 novel coronavirus disease (COVID-19)  2020    Hemorrhoids    Bleeding hemorrhoids    Peripheral arterial disease    Claudication    BPH with urinary obstruction    ACC/AHA stage D systolic heart failure    Anticoagulation goal of INR 2.0 to 2.5    Falls    Clavicle fracture    CKD (chronic kidney disease), stage III    Iron deficiency anemia    H/O epistaxis    Vertigo    GI bleed    S/P TVR (tricuspid valve repair)    S/P ventricular assist device    S/P endoscopy      ====================ASSESSMENT ==============  Stage D Nonischemic Cardiomyopathy, Status Post HM2 on 2017    Cardiogenic shock  Hemodynamic instability   Acute hypoxemic respiratory failure s/p trach , decannulated on ; Reintubated on    GI bleed , Status Post Enteroscopy   Anemia, in setting of melena   Chronic Kidney Disease  Stress hyperglycemia   C.diff positive on    Hypovolemic shock  Septic shock  Leukocytosis  GB thickening/percholecystic s/p perc sybil by IR   SMA stenosis  Serratia/citrobacter pneumonia   Stenotrophomonas pneumonia   Enterobacter pneumonia   Nasuea/vomiting  Deconditioning       Plan:  ====================== NEUROLOGY=====================  Continue close monitoring of neuro status   PRN Fentanyl and PRN Tylenol for analgesia   Ambulate/PT as tolerated     acetaminophen    Suspension .. 650 milliGRAM(s) Enteral Tube every 6 hours PRN Mild Pain (1 - 3)  fentaNYL    Injectable 12.5 MICROGram(s) IV Push every 3 hours PRN Moderate to severe pain  ==================== RESPIRATORY======================  Acute hypoxemic respiratory failure s/p #8 shiley trach on ; decannulated on ; Reintubated on ; trach size 6 DCT cuff on 10/4   Continue close monitoring of respiratory rate and breathing pattern, following of ABG’s with A-line monitoring, continuous pulse oximetry monitoring.   CPAP vent weaning as tolerated     Mechanical Ventilation:  Mode: CPAP with PS  FiO2: 40  PEEP: 5  PS: 10  MAP: 7  PIP: 15  ====================CARDIOVASCULAR==================  Stage D Nonischemic Cardiomyopathy, Status Post HM2 on 2017; LVAD settings 9200, flow 5.8  TTE 10/4:  Severely decreased LV systolic function, Diffuse hypokinesis. Mild AR. No pericardial effusion   VT on 10/4, s/p Lidocaine drip, continue close tele monitoring   Holding off on Propranolol for now   ===================HEMATOLOGIC/ONC ===================  CTA A/P on  +L RP hematoma   Acute blood loss anemia, monitor H&H/Plts   Holding coumadin and ASA given recurrent GI bleeding, continue monitoring LDH   ===================== RENAL =========================  Hx of CKD, continue monitoring urine output, I&OS, BUN/Cr   Replete lytes PRN. Keep K> 4 and Mg >2  C/w Aldactone for diuresis    spironolactone 12.5 milliGRAM(s) Oral every 12 hours  ==================== GASTROINTESTINAL===================  TFs held in setting of ongoing nausea / recent emesis   CT A/P 10/5 neg for intra-abd abcess, severe stenosis of prox SMA and b/l iliac arteries  CTA A/P : Evaluation of the mesenteric vessels is limited by streak artifact from LVAD. There appears to be severe stenosis of the proximal SMA; abdominal mesenteric doppler is recommended for further evaluation. 2.  No small bowel findings to suggest acute mesenteric ischemia. 3.  Focal dissections involving the right and left common iliac arteries.  Stenosis of proximal SMA, s/p failed SMA stent c/b RP bleed on 9/10 - Pt deferred decision re: reop to HCP (Sister, Cristina Rudolph), who reportedly states she will consent to surgery, vascular to f/u   Reglan for gut motility    dextrose 5% + sodium chloride 0.45% with potassium chloride 10 mEq/L 1000 milliLiter(s) (50 mL/Hr) IV Continuous <Continuous>  multivitamin 1 Tablet(s) Oral daily  pantoprazole  Injectable 40 milliGRAM(s) IV Push every 12 hours  simethicone 80 milliGRAM(s) Chew every 8 hours PRN Gas  thiamine 100 milliGRAM(s) Oral daily  metoclopramide Injectable 10 milliGRAM(s) IV Push every 8 hours  ondansetron Injectable 4 milliGRAM(s) IV Push every 8 hours PRN Nausea and/or Vomiting  =======================    ENDOCRINE  =====================  Stress hyperglycemia, continue glucose control with admelog sliding scale   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   CT C/A/P : progressive ISABELLE opacities and L consolidations, multifocal pneumonia  BCx  + Serratia marcescens, BCx 1/2 on 10/1 +Enterobacter cloacae complex, c/w Meropenem - to be completed 10/15   BCx 10/5 + 10/7 NGTD   Vancomycin solution for C. diff prophylaxis    meropenem  IVPB 1000 milliGRAM(s) IV Intermittent every 8 hours  vancomycin    Solution 125 milliGRAM(s) Oral every 12 hours  vancomycin  IVPB 1000 milliGRAM(s) IV Intermittent every 12 hours      Patient requires continuous monitoring with bedside rhythm monitoring, pulse ox monitoring, and intermittent blood gas analysis. Care plan discussed with ICU care team. Patient remained critical and at risk for life threatening decompensation.     By signing my name below, IJanina Tiffany, attest that this documentation has been prepared under the direction and in the presence of CLAUDIA Gale   Electronically signed: Emily Roa Scribe, 10-14-21 @ 13:13    IAleksandra PA  , personally performed the services described in this documentation. all medical record entries made by the luisibmel were at my direction and in my presence. I have reviewed the chart and agree that the record reflects my personal performance and is accurate and complete  Electronically signed: CLAUDIA Gale        RICKY HAMPTON  MRN-[id number]  Patient is a 66y old  Male who presents with a chief complaint of Anemia, Supratherapeutic INR, Dark Stools (14 Oct 2021 07:29)    HPI:  65M PMH ACC/AHA stage D HF due to NICM HM2 LVAD , TV annuloplasty ring 17 as destination therapy due to severe peripheral artery disease with significant stenosis  SIADH, Depression, CKD-3 with hyperkalemia, past E. coli UTIs, driveline drainage (21) and COVID-19 (back in 2020)  He was recently seen in clinic where he complained of abdominal pain and dark stools w constipation back in May. He presents to [facility] ER today weakness and fatigue, moderate and + Black stools for three days, on coumadin secondary to warfarin use in the setting of an LVAD. Patient has required transfusions for GIB in the past. Mostly recently back in 2021 pt had anemia with dark stools. No interventions was done at that time. However Last Endoscopy was done in 2020 (negative). Today labs show patient is anemic with H/H of 4.5/16.3,. INR is 8.84 MAP in the 90s, Temp 35.1. He denies any chest pain, shortness of breath, dizziness, abd pain, nausea or vomiting.       (2021 16:57)      Surgery/Hospital Course:   admit for melena w/ anemia, INR 8.84   6/15 Capsul study (+) for small bowel bleed, balloon endoscopy (old blood in prox ileum); post EGD - septic w/ L opacity, re-intubated for concern for aspiration, TTE (Mod MR, decrease biV w/ interventricular septum boweing towards R)   bronch    +C Diff    CT C/A/P: Fluid filled colon which may be 2/2 rapid transit. Small bilateral pleffs with associates. Compressive atelectasis New ISABELLE & LLL  parenchymal opacities, suspicious for pneumonia. Moderate stenosis in the proximal superior mesenteric artery.    #8 Shiley trach at bedside    LVAD speed increased to 9200   Bronch   TC since . Patient transferred to SDU.    INR today 2.64.  H&H 7.3/24 this AM.  Will repeat CBC at noon, and will send stool guaiac Patient with persistent abdominal tenderness, rate of tube feeds decreased.  No nausea/vomiting.     INR today 2.4. H&H 9.1/28.6 low flow overnight /N&V, refusing Tube feeds on D5 1/2 normal  @50 cc/hr   INR 2.69  H&H 7.7/.1 refusing Tube feeds on D5 1/2 normal  @50 cc/hr. This am + BM Melena Dr Oneill HF  aware- PRBC x1  GI team consulted -  NPO  plan on study in am-  D/w Dr Cadet Patient  to return to CTU for further management; 1PRBCS    Post op INR 2.2 today.  No bleeding. BC + for SM.  Pt is hypotensive requiring pressor and inotropic support.  ID follow up today on Cefepime will follow.   R PTC for PTX    CT C/A/P: sub q emphysema in R chest wall, GGO RUL, small ascites CTH negative; Abd US: GB thickening, pericholecystic fluid     Perchole drain in place continues to drain total output overnight 133.  Fever today 38.8    duplex LE negative    Patient with persistent abdominal pain, refusing tube feeds and medications, Psych consulted   CTA A/P ordered to r/o mesenteric ischemia 2/2 persistent anorexia, nausea, vomiting. Revealed:  Evaluation of the mesenteric vessels is limited by streak artifact from LVAD. There appears to be severe stenosis of the proximal SMA; abdominal mesenteric doppler is recommended for further evaluation. 2.  No small bowel findings to suggest acute mesenteric ischemia. 3.  Focal dissections involving the right and left common iliac arteries.  8/15: Cultures resulted BC 1/2 +GPC in clusters, SC enterobacter; mesenteric duplex: borderline stenosis of proximal SMA  : CT C/A/P noncon: Nondular opacities in R lung apex w/cavitation, abd nl  :  Continue current care, treatment of thyrotoxicosis with medications as per endocrine, d/c ABX as per team.    RUQ sono: Contracted gallbladder with cholecystostomy tube in place.  9/10 failed SMA stent, c/b RP bleed s/p 3U PRCB   CTA Abd/Pelvis L RP hematoma    Trach decannulated    intubated fro resp distress for increased WOB, LL pneumonia; CT C/A/P: progressive ISABELLE opacities and L consolidations, multifocal pneumonia    TTE (EF 25%, decreased RV, mod MR)   10/3 VT -> Lidocaine gtt   10/4 Trach size 6 DCT cuff  10/5 CT abd (neg for intra-abd abcess, severe stenosis of prox SMA and b/l iliac arteries)    Today:  Pt developed fever TMAX 102.6F overnight. Blood cx was sent. Will follow up on results and start on IV vanco. Pt was as well refusing central line removal overnight. Will attempt to remove central line today. Will set up family meeting to discuss further care of pt as pt desires to give his medical decision to his family.     REVIEW OF SYSTEMS:  Unable to obtain, pt's trach    ICU Vital Signs Last 24 Hrs  T(C): 36.9 (14 Oct 2021 08:00), Max: 39.1 (14 Oct 2021 00:00)  T(F): 98.5 (14 Oct 2021 08:00), Max: 102.4 (14 Oct 2021 00:00)  HR: 76 (14 Oct 2021 13:00) (76 - 147)  BP: --  BP(mean): --  ABP: 82/65 (14 Oct 2021 13:00) (78/62 - 193/190)  ABP(mean): 73 (14 Oct 2021 13:00) (70 - 192)  RR: 14 (14 Oct 2021 13:00) (12 - 37)  SpO2: 100% (14 Oct 2021 13:00) (92% - 100%)      Physical Exam:  Gen:  NAD, limited engagement to conversation  CNS: moves all extremities to command   Neck: no JVD, +trach, +central line   RES : coarse breath sounds, no wheezing    CVS: +LVAD hum   Abd: Soft. Sybil drain with bilious fluid. Positive BS throughout. Mild RUQ abdominal tenderness by perc sybil.  Skin: No rash, erythema, cyanosis.  Vasc: Warm and well-perfused.  Ext:  no edema    ============================I/O===========================   I&O's Detail    13 Oct 2021 07:01  -  14 Oct 2021 07:00  --------------------------------------------------------  IN:    Albumin 5%  - 250 mL: 500 mL    dextrose 5% + sodium chloride 0.45%: 500 mL    dextrose 5% + sodium chloride 0.45% w/ Additives: 650 mL    Enteral Tube Flush: 100 mL    IV PiggyBack: 50 mL    IV PiggyBack: 100 mL    IV PiggyBack: 187.5 mL    sodium chloride 0.9%: 115 mL  Total IN: 2202.5 mL    OUT:    Drain (mL): 210 mL    Emesis (mL): 50 mL    Miscellaneous Tube Feedin mL    Nasogastric/Oral tube (mL): 0 mL    Voided (mL): 1050 mL  Total OUT: 1310 mL    Total NET: 892.5 mL      14 Oct 2021 07:01  -  14 Oct 2021 13:13  --------------------------------------------------------  IN:    dextrose 5% + sodium chloride 0.45% w/ Additives: 300 mL    Enteral Tube Flush: 60 mL    IV PiggyBack: 62.5 mL    IV PiggyBack: 250 mL    sodium chloride 0.9%: 10 mL  Total IN: 682.5 mL    OUT:    Voided (mL): 1025 mL  Total OUT: 1025 mL    Total NET: -342.5 mL        ============================ LABS =========================                        10.5   15.51 )-----------( 174      ( 14 Oct 2021 01:11 )             32.7     10-14    133<L>  |  97  |  6<L>  ----------------------------<  87  3.7   |  24  |  0.40<L>    Ca    9.3      14 Oct 2021 01:11  Phos  1.6     10-14  Mg     1.3     10-14    TPro  7.5  /  Alb  3.8  /  TBili  1.5<H>  /  DBili  x   /  AST  41<H>  /  ALT  39  /  AlkPhos  149<H>  10-14    LIVER FUNCTIONS - ( 14 Oct 2021 01:11 )  Alb: 3.8 g/dL / Pro: 7.5 g/dL / ALK PHOS: 149 U/L / ALT: 39 U/L / AST: 41 U/L / GGT: x             ABG - ( 14 Oct 2021 01:01 )  pH, Arterial: 7.47  pH, Blood: x     /  pCO2: 39    /  pO2: 174   / HCO3: 28    / Base Excess: 4.4   /  SaO2: 100.0               ======================Micro/Rad/Cardio=================  Culture: Reviewed   CXR: Reviewed  Echo:Reviewed  ======================================================  PAST MEDICAL & SURGICAL HISTORY:  CHF (congestive heart failure)    CAD (coronary artery disease)    Depression    Pleural effusion    History of 2019 novel coronavirus disease (COVID-19)  2020    Hemorrhoids    Bleeding hemorrhoids    Peripheral arterial disease    Claudication    BPH with urinary obstruction    ACC/AHA stage D systolic heart failure    Anticoagulation goal of INR 2.0 to 2.5    Falls    Clavicle fracture    CKD (chronic kidney disease), stage III    Iron deficiency anemia    H/O epistaxis    Vertigo    GI bleed    S/P TVR (tricuspid valve repair)    S/P ventricular assist device    S/P endoscopy      ====================ASSESSMENT ==============  Stage D Nonischemic Cardiomyopathy, Status Post HM2 on 2017    Cardiogenic shock  Hemodynamic instability   Acute hypoxemic respiratory failure s/p trach , decannulated on ; Reintubated on    GI bleed , Status Post Enteroscopy   Anemia, in setting of melena   Chronic Kidney Disease  Stress hyperglycemia   C.diff positive on    Hypovolemic shock  Septic shock  Leukocytosis  GB thickening/percholecystic s/p perc sybil by IR   SMA stenosis  Serratia/citrobacter pneumonia   Stenotrophomonas pneumonia   Enterobacter pneumonia   Nasuea/vomiting  Deconditioning       Plan:  ====================== NEUROLOGY=====================  Continue close monitoring of neuro status   PRN Fentanyl and PRN Tylenol for analgesia   Ambulate/PT as tolerated     acetaminophen    Suspension .. 650 milliGRAM(s) Enteral Tube every 6 hours PRN Mild Pain (1 - 3)  fentaNYL    Injectable 12.5 MICROGram(s) IV Push every 3 hours PRN Moderate to severe pain  ==================== RESPIRATORY======================  Acute hypoxemic respiratory failure s/p #8 shiley trach on ; decannulated on ; Reintubated on ; trach size 6 DCT cuff on 10/4   Continue close monitoring of respiratory rate and breathing pattern, following of ABG’s with A-line monitoring, continuous pulse oximetry monitoring.   CPAP vent weaning as tolerated   Try trach collar today    Mechanical Ventilation:  Mode: CPAP with PS  FiO2: 40  PEEP: 5  PS: 10  MAP: 7  PIP: 15  ====================CARDIOVASCULAR==================  Stage D Nonischemic Cardiomyopathy, Status Post HM2 on 2017; LVAD settings 9200, flow 5.8  TTE 10/4:  Severely decreased LV systolic function, Diffuse hypokinesis. Mild AR. No pericardial effusion   VT on 10/4, s/p Lidocaine drip, continue close tele monitoring   Holding off on Propranolol for now     ===================HEMATOLOGIC/ONC ===================  CTA A/P on  +L RP hematoma   Acute blood loss anemia, monitor H&H/Plts   Holding coumadin and ASA given recurrent GI bleeding, continue monitoring LDH     ===================== RENAL =========================  Hx of CKD, continue monitoring urine output, I&OS, BUN/Cr   Replete lytes PRN. Keep K> 4 and Mg >2  C/w Aldactone for diuresis    spironolactone 12.5 milliGRAM(s) Oral every 12 hours    ==================== GASTROINTESTINAL===================  TFs held in setting of ongoing nausea / recent emesis   CT A/P 10/5 neg for intra-abd abcess, severe stenosis of prox SMA and b/l iliac arteries  CTA A/P : Evaluation of the mesenteric vessels is limited by streak artifact from LVAD. There appears to be severe stenosis of the proximal SMA; abdominal mesenteric doppler is recommended for further evaluation. 2.  No small bowel findings to suggest acute mesenteric ischemia. 3.  Focal dissections involving the right and left common iliac arteries.  Stenosis of proximal SMA, s/p failed SMA stent c/b RP bleed on 9/10 - Pt deferred decision re: reop to HCP (Sister, Cristina Rudolph), who reportedly states she will consent to surgery, vascular to f/u   Reglan for gut motility    dextrose 5% + sodium chloride 0.45% with potassium chloride 10 mEq/L 1000 milliLiter(s) (50 mL/Hr) IV Continuous <Continuous>  multivitamin 1 Tablet(s) Oral daily  pantoprazole  Injectable 40 milliGRAM(s) IV Push every 12 hours  simethicone 80 milliGRAM(s) Chew every 8 hours PRN Gas  thiamine 100 milliGRAM(s) Oral daily  metoclopramide Injectable 10 milliGRAM(s) IV Push every 8 hours  ondansetron Injectable 4 milliGRAM(s) IV Push every 8 hours PRN Nausea and/or Vomiting  =======================    ENDOCRINE  =====================  Stress hyperglycemia, continue glucose control with admelog sliding scale   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   CT C/A/P : progressive ISABELLE opacities and L consolidations, multifocal pneumonia  BCx  + Serratia marcescens, BCx 1/2 on 10/1 +Enterobacter cloacae complex, c/w Meropenem - to be completed 10/15   BCx 10 + 10/7 NGTD   Vancomycin solution for C. diff prophylaxis  will add IV vanco and continue meropenem    meropenem  IVPB 1000 milliGRAM(s) IV Intermittent every 8 hours  vancomycin    Solution 125 milliGRAM(s) Oral every 12 hours  vancomycin  IVPB 1000 milliGRAM(s) IV Intermittent every 12 hours      Patient requires continuous monitoring with bedside rhythm monitoring, pulse ox monitoring, and intermittent blood gas analysis. Care plan discussed with ICU care team. Patient remained critical and at risk for life threatening decompensation.     By signing my name below, Janina STANLEY Tiffany, attest that this documentation has been prepared under the direction and in the presence of CLAUDIA Gale   Electronically signed: Emily Roa Scribe, 10-14-21 @ 13:13    Aleksandra STANLEY PA  , personally performed the services described in this documentation. all medical record entries made by the luisibmel were at my direction and in my presence. I have reviewed the chart and agree that the record reflects my personal performance and is accurate and complete  Electronically signed: CLAUDIA Gale

## 2021-10-14 NOTE — PROGRESS NOTE ADULT - ASSESSMENT
Mental status exam: Seen resting in bed in CTU with trach. Awake and alert. Communicating via moving his hands, shaking head yes/no. Mood moved hand back and forth suggesting "so-so." Affect less constricted. Ambulated in antonio with PT and did well as per PT. Denies SI/HI.     Psychological assessment: Seen lying in bed in CTU, with trach. Communicating via moving his hands, shaking head yes/no. When asked about proposed stent procedure, indicated "yes" by nodding his head. When asked why he wants to pursue, pointed to his abdomen. Reports his pain is 3 out of 10 on pain scale by nodding head to correct number. Receptive to support and validation. Provided some information about his family. He is one of 3 siblings, came to [place] from [facility] in 1989 (segundo date on bed with his finger). When he came did not speak any English. Nodded "yes" when asked if he misses living in [facility] and nodded yes that he thinks of it often.     Dx: Adjustment disorder with anxiety and depressed mood. F43.23 Systolic heart failure I50.2  LVAD Z95.811    Recommendations:   Behavioral Cardiology will follow      20 minutes spent on total patient encounter    Jessica Hennessy, PhD  [phone]

## 2021-10-14 NOTE — PROGRESS NOTE ADULT - SUBJECTIVE AND OBJECTIVE BOX
Subjective: Patient seen and examined resting in bed. ON became febrile with tmax 102.4, and fluid was given    Medications:  acetaminophen    Suspension .. 650 milliGRAM(s) Enteral Tube every 6 hours PRN  chlorhexidine 0.12% Liquid 15 milliLiter(s) Oral Mucosa two times a day  chlorhexidine 2% Cloths 1 Application(s) Topical <User Schedule>  dextrose 5% + sodium chloride 0.45% with potassium chloride 10 mEq/L 1000 milliLiter(s) IV Continuous <Continuous>  fentaNYL    Injectable 12.5 MICROGram(s) IV Push every 3 hours PRN  insulin lispro (ADMELOG) corrective regimen sliding scale   SubCutaneous every 6 hours  meropenem  IVPB 1000 milliGRAM(s) IV Intermittent every 8 hours  methimazole 20 milliGRAM(s) Oral <User Schedule>  metoclopramide Injectable 10 milliGRAM(s) IV Push every 8 hours  multivitamin 1 Tablet(s) Oral daily  pantoprazole  Injectable 40 milliGRAM(s) IV Push every 12 hours  predniSONE   Tablet 30 milliGRAM(s) Oral daily  simethicone 80 milliGRAM(s) Chew every 8 hours PRN  sodium chloride 0.9%. 1000 milliLiter(s) IV Continuous <Continuous>  spironolactone 12.5 milliGRAM(s) Oral every 12 hours  thiamine 100 milliGRAM(s) Oral daily  vancomycin    Solution 125 milliGRAM(s) Oral every 12 hours      ICU Vital Signs Last 24 Hrs  T(C): 37 (14 Oct 2021 04:00), Max: 39.1 (14 Oct 2021 00:00)  T(F): 98.6 (14 Oct 2021 04:00), Max: 102.4 (14 Oct 2021 00:00)  HR: 82 (14 Oct 2021 07:00) (82 - 147)  BP: --  BP(mean): --  ABP: 78/62 (14 Oct 2021 07:00) (78/62 - 193/190)  ABP(mean): 70 (14 Oct 2021 07:00) (70 - 192)  RR: 12 (14 Oct 2021 07:00) (12 - 25)  SpO2: 100% (14 Oct 2021 07:00) (91% - 100%)      Mode: AC/ CMV (Assist Control/ Continuous Mandatory Ventilation)  RR (machine): 12  TV (machine): 500  FiO2: 40  PEEP: 5  MAP: 10  PIP: 26      Weight in k.9 (10-14 @ 00:00)    I&O's Summary    13 Oct 2021 07:01  -  14 Oct 2021 07:00  --------------------------------------------------------  IN: 1415 mL / OUT: 1050 mL / NET: 365 mL    Physical Exam  General: thin appear and frail   Neck: Neck supple. JVP not elevated. No masses  Chest: +trach, +vent sounds   CV: +VAD hum  Abdomen: Soft, non-distended, tenderness noted in epigastric region, +keotube, Biliary drain   Neurology: Alert and oriented times three.     LVAD Interrogation  VAD TYPE HM 2  Speed 9200  Flow 6  Power 5.3  PI  6.2  Assessment of driveline exit site: driveline dressing c/d/i  Programming changes: no changes made    Labs:                        10.5   15.51 )-----------( 174      ( 14 Oct 2021 01:11 )             32.7     10-    133<L>  |  97  |  6<L>  ----------------------------<  87  3.7   |  24  |  0.40<L>    Ca    9.3      14 Oct 2021 01:11  Phos  1.6     10-14  Mg     1.3     10-14    TPro  7.5  /  Alb  3.8  /  TBili  1.5<H>  /  DBili  x   /  AST  41<H>  /  ALT  39  /  AlkPhos  149<H>  10-14        Lactate Dehydrogenase, Serum: 364 U/L (10-14 @ 01:11)  Lactate Dehydrogenase, Serum: 253 U/L (10-13 @ 01:38)  Lactate Dehydrogenase, Serum: 228 U/L (10-12 @ 00:25)

## 2021-10-14 NOTE — PROGRESS NOTE ADULT - ASSESSMENT
65 YO M with a history of stage D NICM s/p HM2 on 9/2017 as DT (due to severe PAD) with TV ring, prior COVID-19 infection 4/2020, recurrent syncope post LVAD s/p ILR, and chronic abdominal pain with prior negative workup who was admitted 6/14/21 with symptomatic anemia with Hgb 4.5 in setting of INR 8.8 without hemodynamic instability and has since had a protracted hospitalization. He was transfused and underwent VCE which showed active bleeding in the mid small bowel but subsequent enteroscopy 6/15 did not reveal any active bleeding. He acutely decompensated after procedure with fever/hypertension, low flow alarms, and pulmonary infiltrate with hypoxia requiring intubation from probable aspiration PNA. He was unable to extubated and has since undergone tracheostomy but tolerating persistent trach collar and nearing ability for decannulation. 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 He underwent angiogram on 9/10, stent was unable to be placed and course was then complicated by RP bleed. He continued to have persistent leukocytosis and febrile episodes and was noted to have positive sputum culture for serratia marcescens and completed his course of abx. He was initially decannulated on 9/23. Recently he started having multiples of melena, emesis and went into acte respiratory distress and was ultimately re-intubated on 9/26.     He had blood cultures are positive for enterobacter cloacae/serratia, remains on IV antibiotics. He recently became febrile, new culture was taken on 10/14. Fluids were given for soft MAP, currently off pressors. He is s/p trach with ENT on 10/4. On going discussion with patient and vascular about possibly undergoing SMA stent. Ethics and palliative care following. His overall prognosis remains poor. Of note if patient is not agreeable to intervention will focus on making patient comfortable.  67 YO M with a history of stage D NICM s/p HM2 on 9/2017 as DT (due to severe PAD) with TV ring, prior COVID-19 infection 4/2020, recurrent syncope post LVAD s/p ILR, and chronic abdominal pain with prior negative workup who was admitted 6/14/21 with symptomatic anemia with Hgb 4.5 in setting of INR 8.8 without hemodynamic instability and has since had a protracted hospitalization. He was transfused and underwent VCE which showed active bleeding in the mid small bowel but subsequent enteroscopy 6/15 did not reveal any active bleeding. He acutely decompensated after procedure with fever/hypertension, low flow alarms, and pulmonary infiltrate with hypoxia requiring intubation from probable aspiration PNA. He was unable to extubated and has since undergone tracheostomy but tolerating persistent trach collar and nearing ability for decannulation. 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 He underwent angiogram on 9/10, stent was unable to be placed and course was then complicated by RP bleed. He continued to have persistent leukocytosis and febrile episodes and was noted to have positive sputum culture for serratia marcescens and completed his course of abx. He was initially decannulated on 9/23. Recently he started having multiples of melena, emesis and went into acte respiratory distress and was ultimately re-intubated on 9/26.     He had blood cultures are positive for enterobacter cloacae/serratia, remains on IV antibiotics. He is s/p trach with ENT on 10/4. Today he developed fevers with Tmax 102.4, new cultures were taken. Unfortunately he is currently not a candidate for a vascular procedure at this time. His overall prognosis is poor. There is will be a family meeting tomorrow at bedside to discuss GOC and making patient comfortable at this time given.

## 2021-10-14 NOTE — PROGRESS NOTE ADULT - PROBLEM SELECTOR PLAN 3
- Vascular surgery following, may reattempt via subclavian approach  - Patient was made aware of risk and benefits, was previously refusing intervention. Will need to finalize decision today - Vascular surgery following, may reattempt via subclavian approach  - Patient was made aware of risk and benefits, was previously refusing intervention. Unfortunately he is currently not a candidate at this time due to sepsis.

## 2021-10-14 NOTE — PROGRESS NOTE ADULT - ASSESSMENT
Assessment and Recommendation:   · Assessment	  Assessment and recommendation :  Recurrent Acute hypoxic respiratory Failure  FI02 is 40% S/P tracheostomy  on track. collar   Acute blood loss anemia S/P multiple  blood transfusion post tracheostomy   recurrent  septic shock  weaned off  vasopressin   hemorrhagic shock receiving 2 unit of blood transfusion   pan culture negative on meropenem  , po vancomycin   S/P cholecystostomy tube placement by IR    patient refuse repeat vacular procedure   AF RVR back to regular sinus Rhythm   Non ischemic cardiomyopathy continue ACE inhibitor and B-Blockers   mesenteric ischemia failure to stent SMA , no plan for repeated trial   S/P 3 unit of blood transfusion   INR is very high repeat , no evidence of bleeding   Stage D systolic heart failure S/P LVAD HM2   MH2 LVAD  with  TV Annuloplasty  Severe peripheral vascular disease   severe hyperglycemia on insulin coverage    Reglan 10 mg for Gastric Motility   hyperthyroidism on Methimazole 10mg TID   critical care polyneuropathy   Anemia of Acute blood Loss   severe protein caloric malnutrition   magnesium supplement keep above 2   potasium supplement   Chronic kidney disease stage III  Depressed and withdrawn   resume NG tube feeding   GI prophylaxis with PPI   weaning trial ?  Discussed with TCV team   critical care time is 35 minutes

## 2021-10-14 NOTE — PROGRESS NOTE ADULT - SUBJECTIVE AND OBJECTIVE BOX
RICKY JOINT  MRN#: [id number]  Subjective:  Pulmonary progress  : recurrent Acute hypoxic respiratory Failure ,aspiration pneumonia, NICM  ,   65M PMH ACC/AHA stage D HF due to NICM HM2 LVAD , TV annuloplasty ring 17 as destination therapy due to severe peripheral artery disease with significant stenosis  SIADH, Depression, CKD-3 with hyperkalemia, past E. coli UTIs, driveline drainage (21) and COVID-19 (back in 2020)  He was recently seen in clinic where he complained of abdominal pain and dark stools w constipation back in May. He presents to [facility] ER today weakness and fatigue, moderate and + Black stools for three days, on coumadin secondary to warfarin use in the setting of an LVAD. Patient has required transfusions for GIB in the past. Mostly recently back in 2021 pt had anemia with dark stools. No interventions was done at that time. However Last Endoscopy was done in 2020 (negative). Today labs show patient is anemic with H/H of 4.5/16.3,. INR is 8.84 MAP in the 90s, Temp 35.1. He denies any chest pain, shortness of breath, dizziness, abd pain, nausea or vomiting. found to have  rectal bleeding underwent endoscopy ,old blood in the proximal ileum ,  develop sepsis with LL opacity given Antibiotics , Extubated , reintubated , Bronchoscopy on Zosyn for LL pneumonia  and Amiodarone S/P TV Annuloplasty , patient remain intubated on full ventilatory support .S/P multiple units of blood transfusion , remain on full ventilatory support on Precedex and propofol , new central IJ line , diarrhea C diff. +ve on po vancomycin and IV Flagyl,  mildly distended belly , fever start on cefepime 2gm q 8 hrs S/P tracheostomy .  new RT Subclavian central line continue on contact  isolation ,S/P  C-diff antibiotics, no more diarrhea back on full support mechanical ventilator , chest x ray show improvement in LLL air space disease, more awake and responsive on tube feeding no more diarrhea ,  no nausea or vomiting or diarrhea still very weak and tired , back on tube feeding ,still on po vancomycin , getting PT and OT at bed side ,   , no SOB getting stronger , improve muscle tone patient transfer to monitor bed still on contact isolation for C-Difficel colitis on 50% FI02, and change to Suresh  tube feeding still loose stool . H/H drop significantly require blood transfusion , most likely GI bleeding , IV heparin D/C ,  H/H is stable ., patient develop TR sided  pneumothorax require chest tube placement , RT IJ central line  placed , develop fever shaking chills , blood culture positive for serratia marcescens , start on cefepime .the patient  become hypoxic and hypotensive placed on full ventilatory support and Vasopressin , levophed and Dobutamine ,S/P blood transfusion on meropenem and vancomycin ,   , on and  off pressors , occasional agitation on Precedex .S/P IR cholecystostomy tube drainage placement in the RT upper Quadrant , resume anticoagulation chest x ray noted C-PAP trail lasted only for 2 hrs , new RT SC line and D/C RT IJ line , RT pig tail cathter has been removed , tolerating C-PAP trial placed on trach. collar 50% FI02 GI consultant noted on NG tube feeding as tolerated , develop AF RVR S/P  bolus Amiodarone  back to regular sinus Rhythm , Flat Affect depressed , back on tube feeding Vital AF at 60 cc/ hr .still intermittent abdominal pain , no fever saturation is accepted  back on full ventilatory support ,   on methimazole for hyperthyroidism ,  condition is the same ,still C/O Abdominal pain , white count is improving , no chest pain or SOB ,  .placed on Reglan 10 mg TID for gastric motility , depressed , withdrawn. , S/P  mesenteric angiogram , unable to stent SMA S/P 3 units of blood transfusion   RT IJ in place reassessed for stent in the SMA by vascular,  dark stool stable H/H ,surgical opinion for possible Lap cholecystectomy. over night events noted develop respiratory distress, , rectal bleeding, require intubation placed on full ventilatory support , FI02 is 50% also became hemianosmically unstable require triple pressor support with levophed , vasopressin and norsynephrine, pan culture placed  on Vancomycin IV and PO  and Zosyn, sedated with propofol kept NPO , new central line RT and left IJ cathter placed . Diflucan Added .fever of 38.5 weaned off  vasopressin , all culture are negative, resume tube feeding ,  white count is improving , on meropenem, vancomycin solution  , on Precedex, no over night events , S/P  tracheostomy , bleeding receiving blood transfusion on vasopressin , no more bleeding , no fever , more awake and responsive ,tolerating tube feeding , ID and heart failure follow up appreciated , depresses no weaning for now , magnesium is low to give supplement too keep Above 2 , potassium is low to give supplement , patient refuse vascular procedure for superior mesenteric artery occlusion C-PAP trial to mei cabrera      (2021 16:57)    PAST MEDICAL & SURGICAL HISTORY:  CHF (congestive heart failure)    CAD (coronary artery disease)  Depression    Pleural effusion    History of 2019 novel coronavirus disease (COVID-19)  2020    Hemorrhoids    Bleeding hemorrhoids    Peripheral arterial disease    Claudication    BPH with urinary obstruction    ACC/AHA stage D systolic heart failure  Anticoagulation goal of INR 2.0 to 2.5    Falls    Clavicle fracture    CKD (chronic kidney disease), stage III    Iron deficiency anemia    H/O epistaxis    Vertigo    GI bleed    S/P TVR (tricuspid valve repair)    S/P ventricular assist device    S/P endoscopy    OBJECTIVE:  ICU Vital Signs Last 24 Hrs  T(C): 38.2 (2021 10:00), Max: 38.5 (2021 12:00)  T(F): 100.8 (2021 10:00), Max: 101.3 (2021 12:00)  HR: 65 (2021 10:00) (61 - 69)  BP: --  BP(mean): --  ABP: 105/67 (2021 10:00) (90/54 - 113/64)  ABP(mean): 77 (2021 10:00) (63 - 77)  RR: 20 (2021 10:00) (19 - 35)  SpO2: 99% (2021 10:00) (96% - 100%)       @ :  -   @ 07:00  --------------------------------------------------------  IN: 2693.9 mL / OUT: 1415 mL / NET: 1278.9 mL     @ 07: @ 10:49  --------------------------------------------------------  IN: 420.8 mL / OUT: 115 mL / NET: 305.8 mL  PHYSICAL EXAM: Daily   Elderly male  on trach. collar  FI02 is 40% S/P tracheostomy   Daily Weight in k.4 (2021 00:00)  HEENT:     + NCAT  + EOMI  - Conjuctival edema   - Icterus   - Thrush   - ETT  + NGT/OGT  Neck:         + FROM  RT IJ , LT IJ  lines JVD  - Nodes - Masses + Mid-line trachea + Tracheostomy  Chest:            normal A-P diameter    Lungs:          + CTA   + Rhonchi    - Rales    - Wheezing + Decreased  LT BS   - Dullness R L  Cardiac:       + S1 + S2    + RRR   - Irregular   - S3  - S4    - Murmurs   - Rub   - Hamman’s sign   Abdomen:    + BS  + Soft + Non-tender - Distended - Organomegaly - PEG .cholecystostomy tube in place  Extremities:   - Cyanosis U/L   - Clubbing  U/L  + LE/UE Edema   + Capillary refill    + Pulses   Neuro:        - Awake   -  Alert   - Confused   - Lethargic   + Sedated  + Generalized Weakness  Skin:        - Rashes    - Erythema   + Normal incisions   + IV sites intact          HOSPITAL MEDICATIONS: All medications reviewed and analyzed  MEDICATIONS  (STANDING):  amiodarone    Tablet 200 milliGRAM(s) Oral daily  chlorhexidine 0.12% Liquid 15 milliLiter(s) Oral Mucosa every 12 hours  chlorhexidine 2% Cloths 1 Application(s) Topical <User Schedule>  dexmedetomidine Infusion 0.5 MICROgram(s)/kG/Hr (9.81 mL/Hr) IV Continuous <Continuous>  dextrose 50% Injectable 50 milliLiter(s) IV Push every 15 minutes  heparin  Infusion 400 Unit(s)/Hr (12.5 mL/Hr) IV Continuous <Continuous>  Hydromorphone  Injectable 0.5 milliGRAM(s) IV Push once  insulin lispro (ADMELOG) corrective regimen sliding scale   SubCutaneous every 6 hours  pantoprazole  Injectable 40 milliGRAM(s) IV Push every 12 hours  piperacillin/tazobactam IVPB.. 3.375 Gram(s) IV Intermittent every 8 hours  propofol Infusion 20 MICROgram(s)/kG/Min (9.42 mL/Hr) IV Continuous <Continuous>  sodium chloride 0.9% lock flush 3 milliLiter(s) IV Push every 8 hours  sodium chloride 0.9%. 1000 milliLiter(s) (10 mL/Hr) IV Continuous <Continuous>    MEDICATIONS  (PRN):  acetaminophen    Suspension .. 650 milliGRAM(s) Oral every 6 hours PRN Temp greater or equal to 38C (100.4F)    LABS: All Lab data reviewed and analyzed                        10.5   15.51 )-----------( 174      ( 14 Oct 2021 01:11 )             32.7    10-14    133<L>  |  97  |  6<L>  ----------------------------<  87  3.7   |  24  |  0.40<L>    Ca    9.3      14 Oct 2021 01:11  Phos  1.6     10-14  Mg     1.3     10-14    TPro  7.5  /  Alb  3.8  /  TBili  1.5<H>  /  DBili  x   /  AST  41<H>  /  ALT  39  /  AlkPhos  149<H>  10-    Ca    9.5      13 Oct 2021 01:38  Phos  1.9     10-13  Mg     1.6     10-13    TPro  7.3  /  Alb  3.7  /  TBili  1.2  /  DBili  x   /  AST  24  /  ALT  24  /  AlkPhos  139<H>  10-                                                                                                                                                                      PTT - ( 2021 04:52 )  PTT:45.2 sec LIVER FUNCTIONS - ( 2021 00:42 )  Alb: 3.4 g/dL / Pro: 6.7 g/dL / ALK PHOS: 213 U/L / ALT: 15 U/L / AST: 24 U/L / GGT: x           RADIOLOGY: - Reviewed and analyzed RT Pig tail cathter  , LVAD HM2, CT scan of abdomen reviewed result noted

## 2021-10-14 NOTE — PROGRESS NOTE ADULT - ATTENDING COMMENTS
febrile overnight 102.4. while patient was on misty, po vanco.   patient is more somulent and is disorientated. patient refused removal of central line.   patient has not been fed at all for 6 days.   evidence for rise in LVAD power and LDH  meds: misty, vanco PO, vanco IV added.   LVAD flow 6.0, power 5.3. PI 6.2.   CVP 3-4 HR 90-120SR, MAPS 70-80, afebrile this morning.   I/O +100  CXR clear.   , 253                        10.5   15.51 )-----------( 174      ( 14 Oct 2021 01:11 )             32.7   WBC 15.1, 14.  10-14    133<L>  |  97  |  6<L>  ----------------------------<  87  3.7   |  24  |  0.40<L>  Ca    9.3      14 Oct 2021 01:11  Phos  1.6     10-14  Mg     1.3     10-14  TPro  7.5  /  Alb  3.8  /  TBili  1.5<H>  /  DBili  x   /  AST  41<H>  /  ALT  39  /  AlkPhos  149<H>  10-14  lactate .6  not withstanding prior discussion about decision making, patient is currently not a candidate for a vascular procedure as he is septic. patient is refusing line removal and appears somewhat confused.   discussed on MDR. will remove lines. send cultures.   further family meeting planned.   Zi Werner

## 2021-10-14 NOTE — PROGRESS NOTE ADULT - SUBJECTIVE AND OBJECTIVE BOX
Behavioral Cardiology Progress Note     History of present illness: Mr. Quispe is a 66 year-old man with history of NICM s/p HM2 on 9/2017 as DT (due to severe PAD) with TV ring, prior COVID-19 infection 4/2020, recurrent syncope post LVAD s/p ILR, and chronic abdominal pain with prior negative workup who was admitted with symptomatic anemia with Hgb 4.5 in setting of INR 8.8 without hemodynamic instability. Testing showed active bleeding in the small bowel but subsequent enteroscopy 6/15 did not reveal any active bleeding. He acutely decompensated after procedure with fever/hypertension, low flow alarms, and pulmonary infiltrate with hypoxia requiring intubation from probable aspiration PNA.  Course notable for inability to wean ventilator with persistent secretions for which he underwent tracheostomy as well as acute c diff colitis.     Social history: , lives in his sister’s house in [place]. Has 3 sons (2 live in , 1 in [facility]). One of 3 siblings. Lives in his sister’s house in [place] (Cristina Rudolph [phone]), also in the home are niece ([facility] [phone]) and nephew (Miller Rudolph).  Another sister lives close by in [place] and all other siblings live in [facility] which the family visit often.  Worked full time at [employer] in [place], stopped working due to heart disease. Education: high school graduate.     Substance use:   Tobacco: Former smoker, 8-10 cigarettes/day for 30 years.   Alcohol: Past use of 3-4 drinks of Johnson 2x/week. None for past 4 years.   Drug: Denies any drug use.

## 2021-10-14 NOTE — PROGRESS NOTE ADULT - ASSESSMENT
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 coli UTIs, driveline drainage (1/7/21) and COVID-19 (back in April 2020)  He was recently seen in clinic where he complained of abdominal pain and dark stools w constipation back in May. He presents to [facility] ER today weakness and fatigue, moderate and + Black stools for three days, on coumadin secondary to warfarin use in the setting of an LVAD. Patient has required transfusions for GIB in the past. Mostly recently back in jan 2021 pt had anemia with dark stools. No interventions was done at that time. However Last Endoscopy was done in July 2020 (negative). Today labs show patient is anemic with H/H of 4.5/16.3,. INR is 8.84 MAP in the 90s,   Returned from endoscopy appearing ill tachypneic with chills with new white out of his left lung      A/p  s/p LVAD placement  admission prolonged following GI bleeding, aspiration event, CDI, VAP, chronic abdominal pain, most recently with retroperitoneal bleeding post attempted stent placement  Acute event while on the floor with possible aspiration and required re-intubation and transfer to cTU  Antibiotics with Meropenem  serratia in blood culture on 9/30  and enterobacter in blood culture 10/1 likely from a GI source given the multiple GNRs grown but CT scan without evidence of a specific source raises the possiblilty of GI translocation in the setting on ongoing ischemic bowel?  Continue Meropenem  Developed fevers again overnight and cultures sent and pending  antibiotics broadened to Vancomycin/Meropenem pending culture results  patient refused line changes      GOC discussions with family is ongoing    Morris Prater MD  [phone]  After 5pm/weekends [phone]               65M PMH ACC/AHA stage D HF due to NICM HM2 LVAD , TV annuloplasty ring 9/12/17 as destination therapy due to severe peripheral artery disease with significant stenosis  SIADH, Depression, CKD-3 with hyperkalemia, past E. coli UTIs, driveline drainage (1/7/21) and COVID-19 (back in April 2020)  He was recently seen in clinic where he complained of abdominal pain and dark stools w constipation back in May. He presents to [facility] ER today weakness and fatigue, moderate and + Black stools for three days, on coumadin secondary to warfarin use in the setting of an LVAD. Patient has required transfusions for GIB in the past. Mostly recently back in jan 2021 pt had anemia with dark stools. No interventions was done at that time. However Last Endoscopy was done in July 2020 (negative). Today labs show patient is anemic with H/H of 4.5/16.3,. INR is 8.84 MAP in the 90s,   Returned from endoscopy appearing ill tachypneic with chills with new white out of his left lung      A/p  s/p LVAD placement  admission prolonged following GI bleeding, aspiration event, CDI, VAP, chronic abdominal pain, most recently with retroperitoneal bleeding post attempted stent placement  Acute event while on the floor with possible aspiration and required re-intubation and transfer to cTU  Antibiotics with Meropenem  serratia in blood culture on 9/30  and enterobacter in blood culture 10/1 likely from a GI source given the multiple GNRs grown but CT scan without evidence of a specific source raises the possiblilty of GI translocation in the setting on ongoing ischemic bowel?  Continue Meropenem  Developed fevers again overnight and cultures sent and pending  antibiotics broadened to Vancomycin/Meropenem pending culture results  patient refused line changes  repeat stool studies for C.diff if able to provide specimen  despite fevers actually looks well, and very interactive    Morris Prater MD  [phone]  After 5pm/weekends [phone]

## 2021-10-14 NOTE — PROGRESS NOTE ADULT - PROBLEM SELECTOR PLAN 5
- Unclear source, likely enteric  - Continue meropenem and PO vanc, recs as per ID  - today became febrile with Tmax 102.4, was refusing lines to be exchanged. New blood/sputum culture taken. Previous culture from 10/7 negative   - Prior cholecystitis w/ per dawn drain with bilious output - Unclear source, likely enteric  - Antibiotics were broaden to vanco/meropenem IV and PO vanc  - today became febrile with Tmax 102.4, was refusing lines to be exchanged. New blood/sputum culture taken. Previous culture from 10/7 negative   - Prior cholecystitis w/ per dawn drain with bilious output

## 2021-10-14 NOTE — CHART NOTE - NSCHARTNOTEFT_GEN_A_CORE
Nutrition Follow Up Note  Patient seen for: Malnutrition follow-up    Chart reviewed, events noted. 66M, PMH ACC/AHA stage D HF 2/2 NICM s/p HM2 LVAD (2017). Here initially for GIB prolonged hospital course, s/p tracheostomy, acalculous cholecystitis s/p perc dawn. Patient c persistent intermittent abdominal pain - found with SMA stenosis on mesenteric angiogram on 9/10, however, unable to place stent. CTA Abd/Pelvis () reveals L RP hematoma. Was tolerating TC and decannulated , transferred to [place] , had multiple episodes of vomiting and went into acute respiratory distress to which he was subsequently intubated. CT Scan  showed multifocal PNA; recultured on  and positive enterobacter cloacae - on abx. S/p trach placement 10/4. Vascular re-consulted regarding SMA stenosis stenting - pt refusing procedure - ethics & BH following - Now currently not a candidate 2/2 sepsis. Family meeting scheduled again for tomorrow (10/14)     Source: EMR, Team    Diet, NPO with Tube Feed:   Tube Feeding Modality: Nasogastric  Vital 1.5 Tank (VITAL1.5RTH)  Total Volume for 24 Hours (mL): 960  Continuous  Starting Tube Feed Rate {mL per Hour}: 20  Increase Tube Feed Rate by (mL): 5     Every 6 hours  Until Goal Tube Feed Rate (mL per Hour): 40  Tube Feed Duration (in Hours): 24  Tube Feed Start Time: 10:00 (21 @ 10:11)    EN order provides: 1440kcals, 65g protein, 733ml free H2O  * Team aware goal is 65ml/hr, however, pt has not been able to advance to goal of 40    Is EN order appropriate? No - Previously ordered for Vital 1.5 with goal rate of 65ml/hr x 24hrs, which met 100% nutritional needs    EN Currently not infusing & has been held since 10/8 @ 22:00  *Pt at very high risk for refeeding risk - Noted to be receiving Multivitamin & thiamin daily     - Electrolytes reviewed, Phos & Mg low earlier today - have since been supplemented     - Also receiving Dex5%+NaCl infusing @50ml/hr    Nutrition-related concerns:  - Cholecystostomy tube - general surgery prefers chronic tube management rather than gallbladder removal  - Multivitamin and thiamin supplementation ordered daily  - Insulin Sliding Scale ordered to optimize BG; receiving steroids     GI:  Last BM 10/14.   Bowel Regimen? [] Yes   [x] No  - Emesis noted 10/11 & 10/13  - Noted on abx course at this time  - Reglan ordered as well as Zofran (PRN)    Weight in k.9 (10-14), 57.3 (10-13), 58.3 (10-12), 57.9 (10-11), 57.6 (10-09), 56.3 (10-07), 59.2 (10-06), 62.1 (10-05), 59.8 (10-04), 59.8 (10-04), 69.8 (10-03), 62.7 (10-02), 60 (10-01), 61.9 (), 67.8 (), 62.1 (), 58.1 (), 65.7 (09-15), 58.4 (), 66.2 (), 71.2 ()    Weight history:   - Admission weight: 176.3 pounds / 80 kg (6/15)    MEDICATIONS  (STANDING):  dextrose 5% + sodium chloride 0.45% with potassium chloride 10 mEq/L  insulin lispro (ADMELOG) corrective regimen sliding scale  meropenem  IVPB  methimazole  multivitamin  pantoprazole  Injectable  predniSONE   Tablet  spironolactone  thiamine  vancomycin    Solution  vancomycin  IVPB    Pertinent Labs: 10-14 @ 01:11: Na 133<L>, BUN 6<L>, Cr 0.40<L>, BG 87, K+ 3.7, Phos 1.6<L>, Mg 1.3<L>, Alk Phos 149<H>, ALT/SGPT 39, AST/SGOT 41<H>, HbA1c --    A1C with Estimated Average Glucose Result: 5.4 % (08-15-21 @ 13:52)  A1C with Estimated Average Glucose Result: 5.6 % (06-15-21 @ 02:42)    Finger Sticks:  POCT Blood Glucose.: 163 mg/dL (10-14 @ 13:06)  POCT Blood Glucose.: 98 mg/dL (10-14 @ 05:37)  POCT Blood Glucose.: 107 mg/dL (10-13 @ 17:47)    Skin per nursing documentation: no pressure injuries   Edema: none at this time     Estimated Nutrition Needs:   Using dosing weight 78.5 kg  Energy Needs (25-30 kcal/kg): 4783-9800 kcal/day  Protein Needs (1.2-1.4 g/kg):     Previous Nutrition Diagnosis: Severe chronic malnutrition  Nutrition diagnosis is ongoing & addressed with tube feeds as tolerated    No new nutrition diagnosis at this time    Recommendations:    1. Defer nutrition advancement to team and GOC.   - If EN resumed recommend as medically feasible, initiate Vital 1.5 @ 10ml and increase by 10ml q24hrs as tolerated (and pending electrolytes WNL) to goal rate of 65ml/hr x 24hrs. At goal to provide 1560ml formula, 2340kcals, 105g protein, 1192ml free H2O (meets ~30kcals/kg & 1.33g protein/kg based on dosing wt 78.5kg).  - May consider alternative means of nutrition/hydration pending within GOC  2. Continue micronutrient supplementation as ordered  3. Monitor electrolytes and replenish PRN; Consider decreasing Dextrose infusion as pt at high risk for refeeding syndrome  4. RD to remain available to adjust EN formulary, volume/rate PRN.     Monitoring and Evaluation:   Continue to monitor Nutritional intake, Tolerance to diet prescription, weights, labs, skin integrity  RD remains available upon request and will follow up per protocol     Wendy Travis, MS, RD, CDN, CNSC  pager #[phone]

## 2021-10-14 NOTE — PROGRESS NOTE ADULT - SUBJECTIVE AND OBJECTIVE BOX
INFECTIOUS DISEASES FOLLOW UP-- Anitra Prater  [phone]    This is a follow up note for this  66yMale with  Anemia    Acute cholecystitis        ROS:  CONSTITUTIONAL:  No fever, good appetite  CARDIOVASCULAR:  No chest pain or palpitations  RESPIRATORY:  No dyspnea  GASTROINTESTINAL:  No nausea, vomiting, diarrhea, or abdominal pain  GENITOURINARY:  No dysuria  NEUROLOGIC:  No headache,     Allergies    Amiodarone Hydrochloride (Other (Severe))    Intolerances        ANTIBIOTICS/RELEVANT:  antimicrobials  meropenem  IVPB 1000 milliGRAM(s) IV Intermittent every 8 hours  vancomycin    Solution 125 milliGRAM(s) Oral every 12 hours  vancomycin  IVPB 1000 milliGRAM(s) IV Intermittent every 12 hours    immunologic:    OTHER:  acetaminophen    Suspension .. 650 milliGRAM(s) Enteral Tube every 6 hours PRN  chlorhexidine 0.12% Liquid 15 milliLiter(s) Oral Mucosa two times a day  chlorhexidine 2% Cloths 1 Application(s) Topical <User Schedule>  dextrose 5% + sodium chloride 0.45% with potassium chloride 10 mEq/L 1000 milliLiter(s) IV Continuous <Continuous>  fentaNYL    Injectable 12.5 MICROGram(s) IV Push every 3 hours PRN  insulin lispro (ADMELOG) corrective regimen sliding scale   SubCutaneous every 6 hours  methimazole 20 milliGRAM(s) Oral <User Schedule>  metoclopramide Injectable 10 milliGRAM(s) IV Push every 8 hours  multivitamin 1 Tablet(s) Oral daily  ondansetron Injectable 4 milliGRAM(s) IV Push every 8 hours PRN  pantoprazole  Injectable 40 milliGRAM(s) IV Push every 12 hours  predniSONE   Tablet 30 milliGRAM(s) Oral daily  simethicone 80 milliGRAM(s) Chew every 8 hours PRN  spironolactone 12.5 milliGRAM(s) Oral every 12 hours  thiamine 100 milliGRAM(s) Oral daily      Objective:  Vital Signs Last 24 Hrs  T(C): 35.6 (14 Oct 2021 16:00), Max: 39.1 (14 Oct 2021 00:00)  T(F): 96 (14 Oct 2021 16:00), Max: 102.4 (14 Oct 2021 00:00)  HR: 90 (14 Oct 2021 16:00) (76 - 147)  BP: --  BP(mean): --  RR: 34 (14 Oct 2021 16:00) (12 - 37)  SpO2: 100% (14 Oct 2021 16:00) (92% - 100%)    PHYSICAL EXAM:  Constitutional:no acute distress  Eyes:ALONSO, EOMI  Ear/Nose/Throat: no oral lesions, 	  Respiratory: clear BL  Cardiovascular: S1S2  Gastrointestinal:soft, (+) BS, no tenderness  Extremities:no e/e/c  No Lymphadenopathy  IV sites not inflammed.    LABS:                        10.5   15.51 )-----------( 174      ( 14 Oct 2021 01:11 )             32.7     10-14    133<L>  |  97  |  6<L>  ----------------------------<  87  3.7   |  24  |  0.40<L>    Ca    9.3      14 Oct 2021 01:11  Phos  1.6     10-14  Mg     1.3     10-14    TPro  7.5  /  Alb  3.8  /  TBili  1.5<H>  /  DBili  x   /  AST  41<H>  /  ALT  39  /  AlkPhos  149<H>  10-14          MICROBIOLOGY:            RECENT CULTURES:  10-14 @ 09:38  .Sputum Sputum  --  --  --  --  --      RADIOLOGY & ADDITIONAL STUDIES: INFECTIOUS DISEASES FOLLOW UP-- Anitra Prater  [phone]    This is a follow up note for this  66yMale with  Anemia    Acute cholecystitis    walked in the cTU today        ROS:  CONSTITUTIONAL:  febrile earlier, awake, alert    Allergies    Amiodarone Hydrochloride (Other (Severe))    Intolerances        ANTIBIOTICS/RELEVANT:  antimicrobials  meropenem  IVPB 1000 milliGRAM(s) IV Intermittent every 8 hours  vancomycin    Solution 125 milliGRAM(s) Oral every 12 hours  vancomycin  IVPB 1000 milliGRAM(s) IV Intermittent every 12 hours    immunologic:    OTHER:  acetaminophen    Suspension .. 650 milliGRAM(s) Enteral Tube every 6 hours PRN  chlorhexidine 0.12% Liquid 15 milliLiter(s) Oral Mucosa two times a day  chlorhexidine 2% Cloths 1 Application(s) Topical <User Schedule>  dextrose 5% + sodium chloride 0.45% with potassium chloride 10 mEq/L 1000 milliLiter(s) IV Continuous <Continuous>  fentaNYL    Injectable 12.5 MICROGram(s) IV Push every 3 hours PRN  insulin lispro (ADMELOG) corrective regimen sliding scale   SubCutaneous every 6 hours  methimazole 20 milliGRAM(s) Oral <User Schedule>  metoclopramide Injectable 10 milliGRAM(s) IV Push every 8 hours  multivitamin 1 Tablet(s) Oral daily  ondansetron Injectable 4 milliGRAM(s) IV Push every 8 hours PRN  pantoprazole  Injectable 40 milliGRAM(s) IV Push every 12 hours  predniSONE   Tablet 30 milliGRAM(s) Oral daily  simethicone 80 milliGRAM(s) Chew every 8 hours PRN  spironolactone 12.5 milliGRAM(s) Oral every 12 hours  thiamine 100 milliGRAM(s) Oral daily      Objective:  Vital Signs Last 24 Hrs  T(C): 35.6 (14 Oct 2021 16:00), Max: 39.1 (14 Oct 2021 00:00)  T(F): 96 (14 Oct 2021 16:00), Max: 102.4 (14 Oct 2021 00:00)  HR: 90 (14 Oct 2021 16:00) (76 - 147)  BP: --  BP(mean): --  RR: 34 (14 Oct 2021 16:00) (12 - 37)  SpO2: 100% (14 Oct 2021 16:00) (92% - 100%)    PHYSICAL EXAM:  Constitutional:no acute distress  Eyes:ALONSO, EOMI  Ear/Nose/Throat: no oral lesions, 	  Respiratory: clear BL  Cardiovascular: S1S2  Gastrointestinal:soft, (+) BS, no tenderness  Extremities:no e/e/c  No Lymphadenopathy  IV sites not inflammed.    LABS:                        10.5   15.51 )-----------( 174      ( 14 Oct 2021 01:11 )             32.7     10-14    133<L>  |  97  |  6<L>  ----------------------------<  87  3.7   |  24  |  0.40<L>    Ca    9.3      14 Oct 2021 01:11  Phos  1.6     10-14  Mg     1.3     10-14    TPro  7.5  /  Alb  3.8  /  TBili  1.5<H>  /  DBili  x   /  AST  41<H>  /  ALT  39  /  AlkPhos  149<H>  10-14          MICROBIOLOGY:            RECENT CULTURES:  10-14 @ 09:38  .Sputum Sputum  --  --  --  --  --      RADIOLOGY & ADDITIONAL STUDIES:    < from: Xray Chest 1 View- PORTABLE-Routine (Xray Chest 1 View- PORTABLE-Routine in AM.) (10.14.21 @ 02:11) >  IMPRESSION:  All lines and tubes as above.    LVAD obscures left costophrenic angle. Otherwise clear lungs.    < end of copied text >   cell phone/clothing

## 2021-10-15 NOTE — PROGRESS NOTE ADULT - ATTENDING COMMENTS
patient seems more alert and interactive today.   no further fevers. all cultures negative (rare pseud in sputum). patient managed with IV vanco, IV misty, po vanco,   LVAD flows in the 6L, power 5.8 (no spikes),   discussed at [facility]. vascular team prepare to move forward with family consent and have suggested procedure may be done end of next week.   team feels that if mesenteric revasc is to be undertaken, it should be done as soon as possible to enable reinitiation of nutrition and possibly to diminish the risk of gut translocation. Team will discuss with vascular to expedite procedure.   Zi Werner

## 2021-10-15 NOTE — PROGRESS NOTE ADULT - SUBJECTIVE AND OBJECTIVE BOX
RICKY HAMPTON  MRN-[id number]  Patient is a 66y old  Male who presents with a chief complaint of Anemia, Supratherapeutic INR, Dark Stools (14 Oct 2021 22:56)    HPI:  65M PMH ACC/AHA stage D HF due to NICM HM2 LVAD , TV annuloplasty ring 17 as destination therapy due to severe peripheral artery disease with significant stenosis  SIADH, Depression, CKD-3 with hyperkalemia, past E. coli UTIs, driveline drainage (21) and COVID-19 (back in 2020)  He was recently seen in clinic where he complained of abdominal pain and dark stools w constipation back in May. He presents to [facility] ER today weakness and fatigue, moderate and + Black stools for three days, on coumadin secondary to warfarin use in the setting of an LVAD. Patient has required transfusions for GIB in the past. Mostly recently back in 2021 pt had anemia with dark stools. No interventions was done at that time. However Last Endoscopy was done in 2020 (negative). Today labs show patient is anemic with H/H of 4.5/16.3,. INR is 8.84 MAP in the 90s, Temp 35.1. He denies any chest pain, shortness of breath, dizziness, abd pain, nausea or vomiting.       (2021 16:57)      Surgery/Hospital Course:   admit for melena w/ anemia, INR 8.84   6/15 Capsul study (+) for small bowel bleed, balloon endoscopy (old blood in prox ileum); post EGD - septic w/ L opacity, re-intubated for concern for aspiration, TTE (Mod MR, decrease biV w/ interventricular septum boweing towards R)   bronch    +C Diff    CT C/A/P: Fluid filled colon which may be 2/2 rapid transit. Small bilateral pleffs with associates. Compressive atelectasis New ISABELLE & LLL  parenchymal opacities, suspicious for pneumonia. Moderate stenosis in the proximal superior mesenteric artery.    #8 Shiley trach at bedside    LVAD speed increased to 9200   Bronch   TC since . Patient transferred to SDU.    INR today 2.64.  H&H 7.3/24 this AM.  Will repeat CBC at noon, and will send stool guaiac Patient with persistent abdominal tenderness, rate of tube feeds decreased.  No nausea/vomiting.     INR today 2.4. H&H 9.1/28.6 low flow overnight /N&V, refusing Tube feeds on D5 1/2 normal  @50 cc/hr   INR 2.69  H&H 7.7/.1 refusing Tube feeds on D5 1/2 normal  @50 cc/hr. This am + BM Melena Dr Oneill HF  aware- PRBC x1  GI team consulted -  NPO  plan on study in am-  D/w Dr Cadet Patient  to return to CTU for further management; 1PRBCS    Post op INR 2.2 today.  No bleeding. BC + for SM.  Pt is hypotensive requiring pressor and inotropic support.  ID follow up today on Cefepime will follow.   R PTC for PTX    CT C/A/P: sub q emphysema in R chest wall, GGO RUL, small ascites CTH negative; Abd US: GB thickening, pericholecystic fluid     Perchole drain in place continues to drain total output overnight 133.  Fever today 38.8    duplex LE negative    Patient with persistent abdominal pain, refusing tube feeds and medications, Psych consulted   CTA A/P ordered to r/o mesenteric ischemia 2/2 persistent anorexia, nausea, vomiting. Revealed:  Evaluation of the mesenteric vessels is limited by streak artifact from LVAD. There appears to be severe stenosis of the proximal SMA; abdominal mesenteric doppler is recommended for further evaluation. 2.  No small bowel findings to suggest acute mesenteric ischemia. 3.  Focal dissections involving the right and left common iliac arteries.  8/15: Cultures resulted BC 1/2 +GPC in clusters, SC enterobacter; mesenteric duplex: borderline stenosis of proximal SMA  : CT C/A/P noncon: Nondular opacities in R lung apex w/cavitation, abd nl  :  Continue current care, treatment of thyrotoxicosis with medications as per endocrine, d/c ABX as per team.    RUQ sono: Contracted gallbladder with cholecystostomy tube in place.  9/10 failed SMA stent, c/b RP bleed s/p 3U PRCB   CTA Abd/Pelvis L RP hematoma    Trach decannulated    intubated fro resp distress for increased WOB, LL pneumonia; CT C/A/P: progressive ISABELLE opacities and L consolidations, multifocal pneumonia    TTE (EF 25%, decreased RV, mod MR)   10/3 VT -> Lidocaine gtt   10/4 Trach size 6 DCT cuff  10/5 CT abd (neg for intra-abd abcess, severe stenosis of prox SMA and b/l iliac arteries)    Today:    REVIEW OF SYSTEMS:  Unable to obtain, due to pt being trached     ICU Vital Signs Last 24 Hrs  T(C): 36.4 (15 Oct 2021 08:00), Max: 36.5 (14 Oct 2021 12:00)  T(F): 97.5 (15 Oct 2021 08:00), Max: 97.7 (14 Oct 2021 12:00)  HR: 98 (15 Oct 2021 10:00) (65 - 112)  BP: --  BP(mean): --  ABP: 77/65 (15 Oct 2021 10:00) (75/68 - 295/204)  ABP(mean): 72 (15 Oct 2021 10:00) (69 - 295)  RR: 26 (15 Oct 2021 10:00) (14 - 37)  SpO2: 100% (15 Oct 2021 10:00) (98% - 100%)      Physical Exam:  Gen:  NAD, limited engagement to conversation  CNS: moves all extremities to command   Neck: no JVD, +trach, +central line   RES : coarse breath sounds, no wheezing    CVS: +LVAD hum   Abd: Soft. Sybil drain with bilious fluid. Positive BS throughout. Mild RUQ abdominal tenderness by perc sybil.  Skin: No rash, erythema, cyanosis.  Vasc: Warm and well-perfused.  Ext:  no edema      ============================I/O===========================   I&O's Detail    14 Oct 2021 07:01  -  15 Oct 2021 07:00  --------------------------------------------------------  IN:    dextrose 5% + sodium chloride 0.45% w/ Additives: 1150 mL    Enteral Tube Flush: 180 mL    IV PiggyBack: 312.5 mL    IV PiggyBack: 1050 mL    sodium chloride 0.9%: 10 mL  Total IN: 2702.5 mL    OUT:    Drain (mL): 105 mL    Voided (mL): 2650 mL  Total OUT: 2755 mL    Total NET: -52.5 mL      15 Oct 2021 07:01  -  15 Oct 2021 10:43  --------------------------------------------------------  IN:    dextrose 5% + sodium chloride 0.45% w/ Additives: 130 mL  Total IN: 130 mL    OUT:    Drain (mL): 50 mL    Miscellaneous Tube Feedin mL    Voided (mL): 500 mL  Total OUT: 550 mL    Total NET: -420 mL        ============================ LABS =========================                        10.4   13.29 )-----------( 145      ( 15 Oct 2021 01:14 )             33.3     10-15    135  |  98  |  5<L>  ----------------------------<  83  3.4<L>   |  26  |  0.36<L>    Ca    9.4      15 Oct 2021 01:13  Phos  1.9     10-15  Mg     1.7     10-15    TPro  7.3  /  Alb  4.0  /  TBili  1.5<H>  /  DBili  x   /  AST  24  /  ALT  33  /  AlkPhos  136<H>  1015    LIVER FUNCTIONS - ( 15 Oct 2021 01:13 )  Alb: 4.0 g/dL / Pro: 7.3 g/dL / ALK PHOS: 136 U/L / ALT: 33 U/L / AST: 24 U/L / GGT: x             ABG - ( 15 Oct 2021 04:24 )  pH, Arterial: 7.42  pH, Blood: x     /  pCO2: 46    /  pO2: 181   / HCO3: 30    / Base Excess: 4.6   /  SaO2: 99.7                ======================Micro/Rad/Cardio=================  Culture: Reviewed   CXR: Reviewed  Echo:Reviewed  ======================================================  PAST MEDICAL & SURGICAL HISTORY:  CHF (congestive heart failure)    CAD (coronary artery disease)    Depression    Pleural effusion    History of  novel coronavirus disease (COVID-19)  2020    Hemorrhoids    Bleeding hemorrhoids    Peripheral arterial disease    Claudication    BPH with urinary obstruction    ACC/AHA stage D systolic heart failure    Anticoagulation goal of INR 2.0 to 2.5    Falls    Clavicle fracture    CKD (chronic kidney disease), stage III    Iron deficiency anemia    H/O epistaxis    Vertigo    GI bleed    S/P TVR (tricuspid valve repair)    S/P ventricular assist device    S/P endoscopy      ====================ASSESSMENT ==============  Stage D Nonischemic Cardiomyopathy, Status Post HM2 on 2017    Cardiogenic shock  Hemodynamic instability   Acute hypoxemic respiratory failure s/p trach , decannulated on ; Reintubated on    GI bleed , Status Post Enteroscopy   Anemia, in setting of melena   Chronic Kidney Disease  Stress hyperglycemia   C.diff positive on    Hypovolemic shock  Septic shock  Leukocytosis  GB thickening/percholecystic s/p perc sybil by IR   SMA stenosis  Serratia/citrobacter pneumonia   Stenotrophomonas pneumonia   Enterobacter pneumonia   Nasuea/vomiting  Deconditioning     Plan:  ====================== NEUROLOGY=====================  Nonfocal, continue to monitor neuro status per ICU protocol.   Tylenol PRN for analgesia     acetaminophen    Suspension .. 650 milliGRAM(s) Enteral Tube every 6 hours PRN Mild Pain (1 - 3)  ==================== RESPIRATORY======================  Acute hypoxemic respiratory failure s/p #8 shiley trach on ; decannulated on ; Reintubated on ; trach size 6 DCT cuff on 10/4   Continue close monitoring of respiratory rate and breathing pattern, following of ABG’s with A-line monitoring, continuous pulse oximetry monitoring.   CPAP vent weaning as tolerated   Try trach collar today  FiO2: 40  ====================CARDIOVASCULAR==================  Stage D Nonischemic Cardiomyopathy, Status Post HM2 on 2017; LVAD settings 9200, flow 5.8  TTE 10/4:  Severely decreased LV systolic function, Diffuse hypokinesis. Mild AR. No pericardial effusion   VT on 10/4, s/p Lidocaine drip, continue close tele monitoring   Holding off on Propranolol for now     ===================HEMATOLOGIC/ONC ===================  CTA A/P on  +L RP hematoma   Acute blood loss anemia, monitor H&H/Plts   Holding coumadin and ASA given recurrent GI bleeding, continue monitoring LDH     ===================== RENAL =========================  Hx of CKD, continue monitoring urine output, I&OS, BUN/Cr   Replete lytes PRN. Keep K> 4 and Mg >2  C/w Aldactone for diuresis    spironolactone 12.5 milliGRAM(s) Oral every 12 hours  ==================== GASTROINTESTINAL===================  TFs held in setting of ongoing nausea / recent emesis   CT A/P 10/5 neg for intra-abd abcess, severe stenosis of prox SMA and b/l iliac arteries  CTA A/P : Evaluation of the mesenteric vessels is limited by streak artifact from LVAD. There appears to be severe stenosis of the proximal SMA; abdominal mesenteric doppler is recommended for further evaluation. 2.  No small bowel findings to suggest acute mesenteric ischemia. 3.  Focal dissections involving the right and left common iliac arteries.  Stenosis of proximal SMA, s/p failed SMA stent c/b RP bleed on 9/10 - Pt deferred decision re: reop to HCP (Sister, Cristina Rudolph), who reportedly states she will consent to surgery, vascular to f/u   Reglan for gut motility    dextrose 5% + sodium chloride 0.45% with potassium chloride 10 mEq/L 1000 milliLiter(s) (30 mL/Hr) IV Continuous <Continuous>  multivitamin 1 Tablet(s) Oral daily  pantoprazole  Injectable 40 milliGRAM(s) IV Push every 12 hours  simethicone 80 milliGRAM(s) Chew every 8 hours PRN Gas  thiamine 100 milliGRAM(s) Oral daily  metoclopramide Injectable 10 milliGRAM(s) IV Push every 8 hours  ondansetron Injectable 4 milliGRAM(s) IV Push every 8 hours PRN Nausea and/or Vomiting  =======================    ENDOCRINE  =====================  Stress hyperglycemia, continue glucose control with admelog sliding scale   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   CT C/A/P : progressive ISABELLE opacities and L consolidations, multifocal pneumonia  BCx  + Serratia marcescens, BCx 1/2 on 10/1 +Enterobacter cloacae complex, c/w Meropenem - to be completed 10/15   BCx 10/5 + 10/7 NGTD   Vancomycin solution for C. diff prophylaxis  will add IV vanco and continue meropenem    vancomycin    Solution 125 milliGRAM(s) Oral every 12 hours  vancomycin  IVPB 1000 milliGRAM(s) IV Intermittent every 12 hours      Patient requires continuous monitoring with bedside rhythm monitoring, pulse ox monitoring, and intermittent blood gas analysis. Care plan discussed with ICU care team. Patient remained critical and at risk for life threatening decompensation.     By signing my name below, IJanina Tiffany, attest that this documentation has been prepared under the direction and in the presence of Anastasiya Moulton)  Electronically signed: mEily Roa Scribe, 10-15-21 @ 10:43    Saige STANLEY Melinda (PA) , personally performed the services described in this documentation. all medical record entries made by the romel were at my direction and in my presence. I have reviewed the chart and agree that the record reflects my personal performance and is accurate and complete  Electronically signed: Anastasiya Moulton (PA)         RICKY HAMPTON  MRN-[id number]  Patient is a 66y old  Male who presents with a chief complaint of Anemia, Supratherapeutic INR, Dark Stools (14 Oct 2021 22:56)    HPI:  65M PMH ACC/AHA stage D HF due to NICM HM2 LVAD , TV annuloplasty ring 17 as destination therapy due to severe peripheral artery disease with significant stenosis  SIADH, Depression, CKD-3 with hyperkalemia, past E. coli UTIs, driveline drainage (21) and COVID-19 (back in 2020)  He was recently seen in clinic where he complained of abdominal pain and dark stools w constipation back in May. He presents to [facility] ER today weakness and fatigue, moderate and + Black stools for three days, on coumadin secondary to warfarin use in the setting of an LVAD. Patient has required transfusions for GIB in the past. Mostly recently back in 2021 pt had anemia with dark stools. No interventions was done at that time. However Last Endoscopy was done in 2020 (negative). Today labs show patient is anemic with H/H of 4.5/16.3,. INR is 8.84 MAP in the 90s, Temp 35.1. He denies any chest pain, shortness of breath, dizziness, abd pain, nausea or vomiting.       (2021 16:57)      Surgery/Hospital Course:   admit for melena w/ anemia, INR 8.84   6/15 Capsul study (+) for small bowel bleed, balloon endoscopy (old blood in prox ileum); post EGD - septic w/ L opacity, re-intubated for concern for aspiration, TTE (Mod MR, decrease biV w/ interventricular septum boweing towards R)   bronch    +C Diff    CT C/A/P: Fluid filled colon which may be 2/2 rapid transit. Small bilateral pleffs with associates. Compressive atelectasis New ISABELLE & LLL  parenchymal opacities, suspicious for pneumonia. Moderate stenosis in the proximal superior mesenteric artery.    #8 Shiley trach at bedside    LVAD speed increased to 9200   Bronch   TC since . Patient transferred to SDU.    INR today 2.64.  H&H 7.3/24 this AM.  Will repeat CBC at noon, and will send stool guaiac Patient with persistent abdominal tenderness, rate of tube feeds decreased.  No nausea/vomiting.     INR today 2.4. H&H 9.1/28.6 low flow overnight /N&V, refusing Tube feeds on D5 1/2 normal  @50 cc/hr   INR 2.69  H&H 7.7/.1 refusing Tube feeds on D5 1/2 normal  @50 cc/hr. This am + BM Melena Dr Oneill HF  aware- PRBC x1  GI team consulted -  NPO  plan on study in am-  D/w Dr Cadet Patient  to return to CTU for further management; 1PRBCS    Post op INR 2.2 today.  No bleeding. BC + for SM.  Pt is hypotensive requiring pressor and inotropic support.  ID follow up today on Cefepime will follow.   R PTC for PTX    CT C/A/P: sub q emphysema in R chest wall, GGO RUL, small ascites CTH negative; Abd US: GB thickening, pericholecystic fluid     Perchole drain in place continues to drain total output overnight 133.  Fever today 38.8    duplex LE negative    Patient with persistent abdominal pain, refusing tube feeds and medications, Psych consulted   CTA A/P ordered to r/o mesenteric ischemia 2/2 persistent anorexia, nausea, vomiting. Revealed:  Evaluation of the mesenteric vessels is limited by streak artifact from LVAD. There appears to be severe stenosis of the proximal SMA; abdominal mesenteric doppler is recommended for further evaluation. 2.  No small bowel findings to suggest acute mesenteric ischemia. 3.  Focal dissections involving the right and left common iliac arteries.  8/15: Cultures resulted BC 1/2 +GPC in clusters, SC enterobacter; mesenteric duplex: borderline stenosis of proximal SMA  : CT C/A/P noncon: Nondular opacities in R lung apex w/cavitation, abd nl  :  Continue current care, treatment of thyrotoxicosis with medications as per endocrine, d/c ABX as per team.    RUQ sono: Contracted gallbladder with cholecystostomy tube in place.  9/10 failed SMA stent, c/b RP bleed s/p 3U PRCB   CTA Abd/Pelvis L RP hematoma    Trach decannulated    intubated fro resp distress for increased WOB, LL pneumonia; CT C/A/P: progressive ISABELLE opacities and L consolidations, multifocal pneumonia    TTE (EF 25%, decreased RV, mod MR)   10/3 VT -> Lidocaine gtt   10/4 Trach size 6 DCT cuff  10/5 CT abd (neg for intra-abd abcess, severe stenosis of prox SMA and b/l iliac arteries)    Today:  trach collar, ambulating, planning for vascular stent placement    REVIEW OF SYSTEMS:  No complaints at this time, intermittently complains of abdominal pain and nausea    ICU Vital Signs Last 24 Hrs  T(C): 36.4 (15 Oct 2021 08:00), Max: 36.5 (14 Oct 2021 12:00)  T(F): 97.5 (15 Oct 2021 08:00), Max: 97.7 (14 Oct 2021 12:00)  HR: 98 (15 Oct 2021 10:00) (65 - 112)  BP: --  BP(mean): --  ABP: 77/65 (15 Oct 2021 10:00) (75/68 - 295/204)  ABP(mean): 72 (15 Oct 2021 10:00) (69 - 295)  RR: 26 (15 Oct 2021 10:00) (14 - 37)  SpO2: 100% (15 Oct 2021 10:00) (98% - 100%)      Physical Exam:  Gen:  NAD, limited engagement to conversation  CNS: moves all extremities to command   Neck: no JVD, +trach, +central line   RES : coarse breath sounds, no wheezing    CVS: +LVAD hum   Abd: Soft. Sybil drain with bilious fluid. Positive BS throughout. Mild RUQ abdominal tenderness by perc sybil.  Skin: No rash, erythema, cyanosis.  Vasc: Warm and well-perfused.  Ext:  no edema      ============================I/O===========================   I&O's Detail    14 Oct 2021 07:01  -  15 Oct 2021 07:00  --------------------------------------------------------  IN:    dextrose 5% + sodium chloride 0.45% w/ Additives: 1150 mL    Enteral Tube Flush: 180 mL    IV PiggyBack: 312.5 mL    IV PiggyBack: 1050 mL    sodium chloride 0.9%: 10 mL  Total IN: 2702.5 mL    OUT:    Drain (mL): 105 mL    Voided (mL): 2650 mL  Total OUT: 2755 mL    Total NET: -52.5 mL      15 Oct 2021 07:01  -  15 Oct 2021 10:43  --------------------------------------------------------  IN:    dextrose 5% + sodium chloride 0.45% w/ Additives: 130 mL  Total IN: 130 mL    OUT:    Drain (mL): 50 mL    Miscellaneous Tube Feedin mL    Voided (mL): 500 mL  Total OUT: 550 mL    Total NET: -420 mL        ============================ LABS =========================                        10.4   13.29 )-----------( 145      ( 15 Oct 2021 01:14 )             33.3     10-15    135  |  98  |  5<L>  ----------------------------<  83  3.4<L>   |  26  |  0.36<L>    Ca    9.4      15 Oct 2021 01:13  Phos  1.9     10-15  Mg     1.7     10-15    TPro  7.3  /  Alb  4.0  /  TBili  1.5<H>  /  DBili  x   /  AST  24  /  ALT  33  /  AlkPhos  136<H>  10-15    LIVER FUNCTIONS - ( 15 Oct 2021 01:13 )  Alb: 4.0 g/dL / Pro: 7.3 g/dL / ALK PHOS: 136 U/L / ALT: 33 U/L / AST: 24 U/L / GGT: x             ABG - ( 15 Oct 2021 04:24 )  pH, Arterial: 7.42  pH, Blood: x     /  pCO2: 46    /  pO2: 181   / HCO3: 30    / Base Excess: 4.6   /  SaO2: 99.7                ======================Micro/Rad/Cardio=================  Culture: Reviewed   CXR: Reviewed  Echo:Reviewed  ======================================================  PAST MEDICAL & SURGICAL HISTORY:  CHF (congestive heart failure)    CAD (coronary artery disease)    Depression    Pleural effusion    History of  novel coronavirus disease (COVID-19)  2020    Hemorrhoids    Bleeding hemorrhoids    Peripheral arterial disease    Claudication    BPH with urinary obstruction    ACC/AHA stage D systolic heart failure    Anticoagulation goal of INR 2.0 to 2.5    Falls    Clavicle fracture    CKD (chronic kidney disease), stage III    Iron deficiency anemia    H/O epistaxis    Vertigo    GI bleed    S/P TVR (tricuspid valve repair)    S/P ventricular assist device    S/P endoscopy      ====================ASSESSMENT ==============  Stage D Nonischemic Cardiomyopathy, Status Post HM2 on 2017    Cardiogenic shock  Hemodynamic instability   Acute hypoxemic respiratory failure s/p trach , decannulated on ; Reintubated on    GI bleed , Status Post Enteroscopy   Anemia, in setting of melena   Chronic Kidney Disease  Stress hyperglycemia   C.diff positive on    Hypovolemic shock  Septic shock  Leukocytosis  GB thickening/percholecystic s/p perc sybil by IR   SMA stenosis  Serratia/citrobacter pneumonia   Stenotrophomonas pneumonia   Enterobacter pneumonia   Nasuea/vomiting  Deconditioning     Plan:  ====================== NEUROLOGY=====================  Nonfocal, continue to monitor neuro status per ICU protocol.   Tylenol PRN for analgesia     acetaminophen    Suspension .. 650 milliGRAM(s) Enteral Tube every 6 hours PRN Mild Pain (1 - 3)  ==================== RESPIRATORY======================  Acute hypoxemic respiratory failure s/p #8 shiley trach on ; decannulated on ; Reintubated on ; trach size 6 DCT cuff on 10/4   Continue close monitoring of respiratory rate and breathing pattern, following of ABG’s with A-line monitoring, continuous pulse oximetry monitoring.   trach collar since 3pm yesterday    FiO2: 40  ====================CARDIOVASCULAR==================  Stage D Nonischemic Cardiomyopathy, Status Post HM2 on 2017; LVAD settings 9200, flow 5.8  TTE 10/4:  Severely decreased LV systolic function, Diffuse hypokinesis. Mild AR. No pericardial effusion   VT on 10/4, s/p Lidocaine drip, continue close tele monitoring   Holding off on Propranolol for now     ===================HEMATOLOGIC/ONC ===================  CTA A/P on  +L RP hematoma   Acute blood loss anemia, monitor H&H/Plts   Holding coumadin and ASA given recurrent GI bleeding, continue monitoring LDH     ===================== RENAL =========================  Hx of CKD, continue monitoring urine output, I&OS, BUN/Cr   Replete lytes PRN. Keep K> 4 and Mg >2  C/w Aldactone for diuresis    spironolactone 12.5 milliGRAM(s) Oral every 12 hours  ==================== GASTROINTESTINAL===================  TFs held in setting of ongoing nausea / recent emesis   CT A/P 10/5 neg for intra-abd abcess, severe stenosis of prox SMA and b/l iliac arteries  CTA A/P : Evaluation of the mesenteric vessels is limited by streak artifact from LVAD. There appears to be severe stenosis of the proximal SMA; abdominal mesenteric doppler is recommended for further evaluation. 2.  No small bowel findings to suggest acute mesenteric ischemia. 3.  Focal dissections involving the right and left common iliac arteries.  Stenosis of proximal SMA, s/p failed SMA stent c/b RP bleed on 9/10 - Pt deferred decision re: reop to HCP (Sister, Cristina Rudolph), who reportedly states she will consent to surgery, vascular to f/u   Reglan for gut motility    dextrose 5% + sodium chloride 0.45% with potassium chloride 10 mEq/L 1000 milliLiter(s) (30 mL/Hr) IV Continuous <Continuous>  multivitamin 1 Tablet(s) Oral daily  pantoprazole  Injectable 40 milliGRAM(s) IV Push every 12 hours  simethicone 80 milliGRAM(s) Chew every 8 hours PRN Gas  thiamine 100 milliGRAM(s) Oral daily  metoclopramide Injectable 10 milliGRAM(s) IV Push every 8 hours  ondansetron Injectable 4 milliGRAM(s) IV Push every 8 hours PRN Nausea and/or Vomiting  =======================    ENDOCRINE  =====================  Stress hyperglycemia, continue glucose control with admelog sliding scale   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   CT C/A/P : progressive ISABELLE opacities and L consolidations, multifocal pneumonia  BCx  + Serratia marcescens, BCx 1/2 on 10/1 +Enterobacter cloacae complex, c/w Meropenem - to be completed 10/15   BCx 10/5 + 10/7 NGTD   Vancomycin solution for C. diff prophylaxis  will add IV vanco and continue meropenem    vancomycin    Solution 125 milliGRAM(s) Oral every 12 hours  vancomycin  IVPB 1000 milliGRAM(s) IV Intermittent every 12 hours      Patient requires continuous monitoring with bedside rhythm monitoring, pulse ox monitoring, and intermittent blood gas analysis. Care plan discussed with ICU care team. Patient remained critical and at risk for life threatening decompensation.     By signing my name below, IJanina Tiffany, attest that this documentation has been prepared under the direction and in the presence of Anastasiya Moulton)  Electronically signed: Emily Roa Scribe, 10-15-21 @ 10:43    LIZETH, Anastasiya Moulton (PA) , personally performed the services described in this documentation. all medical record entries made by the romel were at my direction and in my presence. I have reviewed the chart and agree that the record reflects my personal performance and is accurate and complete  Electronically signed: Anastasiya Moulton)

## 2021-10-15 NOTE — PROGRESS NOTE ADULT - PROBLEM SELECTOR PLAN 2
- Stable w/o evidence of LVAD malfunction, settings as above  - Unable to tolerate AC or ECASA due to recurrent GI bleeding  - noted to have slight bump in LDH, continue to trend daily.

## 2021-10-15 NOTE — PROGRESS NOTE ADULT - ASSESSMENT
Assessment and Recommendation:   · Assessment	  Assessment and recommendation :  Recurrent Acute hypoxic respiratory Failure  FI02 is 40% S/P tracheostomy  on track. collar   Acute blood loss anemia S/P multiple  blood transfusion post tracheostomy   recurrent  septic shock  weaned off  vasopressin   hemorrhagic shock receiving 2 unit of blood transfusion   pan culture, fever  on IV meropenem  ,IV vancomycin and  po vancomycin   S/P cholecystostomy tube placement by IR    patient refuse repeat vacular procedure   AF RVR back to regular sinus Rhythm   Non ischemic cardiomyopathy continue ACE inhibitor and B-Blockers   mesenteric ischemia failure to stent SMA , no plan for repeated trial   S/P 3 unit of blood transfusion   INR is very high repeat , no evidence of bleeding   Stage D systolic heart failure S/P LVAD HM2   MH2 LVAD  with  TV Annuloplasty  Severe peripheral vascular disease   severe hyperglycemia on insulin coverage    Reglan 10 mg for Gastric Motility   hyperthyroidism on Methimazole 10mg TID   critical care polyneuropathy   Anemia of Acute blood Loss   severe protein caloric malnutrition   magnesium supplement keep above 2   potasium supplement   Chronic kidney disease stage III  Depressed and withdrawn   resume NG tube feeding   GI prophylaxis with PPI   Discussed with TCV team

## 2021-10-15 NOTE — PROGRESS NOTE ADULT - ASSESSMENT
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 coli UTIs, driveline drainage (1/7/21) and COVID-19 (back in April 2020)  He was recently seen in clinic where he complained of abdominal pain and dark stools w constipation back in May. He presents to [facility] ER today weakness and fatigue, moderate and + Black stools for three days, on coumadin secondary to warfarin use in the setting of an LVAD. Patient has required transfusions for GIB in the past. Mostly recently back in jan 2021 pt had anemia with dark stools. No interventions was done at that time. However Last Endoscopy was done in July 2020 (negative). Today labs show patient is anemic with H/H of 4.5/16.3,. INR is 8.84 MAP in the 90s,   Returned from endoscopy appearing ill tachypneic with chills with new white out of his left lung      A/p  s/p LVAD placement  admission prolonged following GI bleeding, aspiration event, CDI, VAP, chronic abdominal pain, most recently with retroperitoneal bleeding post attempted stent placement  Acute event while on the floor with possible aspiration and required re-intubation and transfer to cTU  Antibiotics with Meropenem  serratia in blood culture on 9/30  and enterobacter in blood culture 10/1 likely from a GI source given the multiple GNRs grown but CT scan without evidence of a specific source raises the possiblilty of GI translocation in the setting on ongoing ischemic bowel?  Continue Meropenem  Developed fevers again overnight and cultures sent and pending  antibiotics broadened to Vancomycin/Meropenem pending culture results  patient refused line changes  repeat stool studies for C.diff if able to provide specimen  despite fevers actually looks well, and very interactive    Morris Prater MD  [phone]  After 5pm/weekends [phone]               65M PMH ACC/AHA stage D HF due to NICM HM2 LVAD , TV annuloplasty ring 9/12/17 as destination therapy due to severe peripheral artery disease with significant stenosis  SIADH, Depression, CKD-3 with hyperkalemia, past E. coli UTIs, driveline drainage (1/7/21) and COVID-19 (back in April 2020)  He was recently seen in clinic where he complained of abdominal pain and dark stools w constipation back in May. He presents to [facility] ER today weakness and fatigue, moderate and + Black stools for three days, on coumadin secondary to warfarin use in the setting of an LVAD. Patient has required transfusions for GIB in the past. Mostly recently back in jan 2021 pt had anemia with dark stools. No interventions was done at that time. However Last Endoscopy was done in July 2020 (negative). Today labs show patient is anemic with H/H of 4.5/16.3,. INR is 8.84 MAP in the 90s,   Returned from endoscopy appearing ill tachypneic with chills with new white out of his left lung      A/p  s/p LVAD placement  admission prolonged following GI bleeding, aspiration event, CDI, VAP, chronic abdominal pain, most recently with retroperitoneal bleeding post attempted stent placement  Acute event while on the floor with possible aspiration and required re-intubation and transfer to cTU  Antibiotics with Meropenem  serratia in blood culture on 9/30  and enterobacter in blood culture 10/1 likely from a GI source given the multiple GNRs grown but CT scan without evidence of a specific source raises the possiblilty of GI translocation in the setting on ongoing ischemic bowel?  Continue Meropenem  Developed fevers again overnight and cultures sent and pending  antibiotics broadened to Vancomycin/Meropenem pending culture results  patient refused line changes  repeat stool studies for C.diff if able to provide specimen  despite fevers actually looks well, and very interactive    sputum from ET tube with stenotrophomonas likely colonization as CXR is relatively clear  can proceed wtoh [lanned SMA stent placement procedure    Morris Prater MD  [phone]  After 5pm/weekends [phone]

## 2021-10-15 NOTE — CHART NOTE - NSCHARTNOTEFT_GEN_A_CORE
This afternoon on October 15. 2021 Vascular Surgeon consented Patient Vic [facility] VIA Health Care Proxy (Sister: Alyssa Rudolph) and [place] . We intend for surgery on Monday for "Aortogram, mesenteric angiograph, possible angioplasty, possible stent."     Vascular Surgery   [id number]

## 2021-10-15 NOTE — PROGRESS NOTE ADULT - ASSESSMENT
65 YO M with a history of stage D NICM s/p HM2 on 9/2017 as DT (due to severe PAD) with TV ring, prior COVID-19 infection 4/2020, recurrent syncope post LVAD s/p ILR, and chronic abdominal pain with prior negative workup who was admitted 6/14/21 with symptomatic anemia with Hgb 4.5 in setting of INR 8.8 without hemodynamic instability and has since had a protracted hospitalization. He was transfused and underwent VCE which showed active bleeding in the mid small bowel but subsequent enteroscopy 6/15 did not reveal any active bleeding. He acutely decompensated after procedure with fever/hypertension, low flow alarms, and pulmonary infiltrate with hypoxia requiring intubation from probable aspiration PNA. He was unable to extubated and has since undergone tracheostomy but tolerating persistent trach collar and nearing ability for decannulation. 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 He underwent angiogram on 9/10, stent was unable to be placed and course was then complicated by RP bleed. He continued to have persistent leukocytosis and febrile episodes and was noted to have positive sputum culture for serratia marcescens and completed his course of abx. He was initially decannulated on 9/23. Recently he started having multiples of melena, emesis and went into acte respiratory distress and was ultimately re-intubated on 9/26.     He had blood cultures are positive for enterobacter cloacae/serratia, remains on IV antibiotics. He is s/p trach with ENT on 10/4. Today he developed fevers with Tmax 102.4, new cultures were taken. Unfortunately he is currently not a candidate for a vascular procedure at this time. His overall prognosis is poor. There is will be a family meeting tomorrow at bedside to discuss GOC and making patient comfortable at this time given.

## 2021-10-15 NOTE — PROGRESS NOTE ADULT - PROBLEM SELECTOR PLAN 5
- Unclear source, likely enteric  - Antibiotics were broaden to vanco/meropenem IV and PO vanc  - today became febrile with Tmax 102.4, was refusing lines to be exchanged. New blood/sputum culture taken. Previous culture from 10/7 negative   - Prior cholecystitis w/ per dawn drain with bilious output.

## 2021-10-15 NOTE — PROGRESS NOTE ADULT - SUBJECTIVE AND OBJECTIVE BOX
INFECTIOUS DISEASES FOLLOW UP-- Anitra Prater  [phone]    This is a follow up note for this  66yMale with  Anemia    Acute cholecystitis        ROS:  CONSTITUTIONAL:  No fever, good appetite  CARDIOVASCULAR:  No chest pain or palpitations  RESPIRATORY:  No dyspnea  GASTROINTESTINAL:  No nausea, vomiting, diarrhea, or abdominal pain  GENITOURINARY:  No dysuria  NEUROLOGIC:  No headache,     Allergies    Amiodarone Hydrochloride (Other (Severe))    Intolerances        ANTIBIOTICS/RELEVANT:  antimicrobials  trimethoprim  40 mG/sulfamethoxazole 200 mG Suspension 320 milliGRAM(s) Oral every 8 hours  vancomycin    Solution 125 milliGRAM(s) Oral every 12 hours  vancomycin  IVPB 1000 milliGRAM(s) IV Intermittent every 12 hours    immunologic:    OTHER:  acetaminophen    Suspension .. 650 milliGRAM(s) Enteral Tube every 6 hours PRN  chlorhexidine 0.12% Liquid 15 milliLiter(s) Oral Mucosa two times a day  chlorhexidine 2% Cloths 1 Application(s) Topical <User Schedule>  dextrose 5% + sodium chloride 0.45% with potassium chloride 10 mEq/L 1000 milliLiter(s) IV Continuous <Continuous>  insulin lispro (ADMELOG) corrective regimen sliding scale   SubCutaneous every 6 hours  methimazole 20 milliGRAM(s) Oral <User Schedule>  metoclopramide Injectable 10 milliGRAM(s) IV Push every 8 hours  multivitamin 1 Tablet(s) Oral daily  ondansetron Injectable 4 milliGRAM(s) IV Push every 8 hours PRN  pantoprazole  Injectable 40 milliGRAM(s) IV Push every 12 hours  predniSONE   Tablet 30 milliGRAM(s) Oral daily  simethicone 80 milliGRAM(s) Chew every 8 hours PRN  spironolactone 12.5 milliGRAM(s) Oral every 12 hours  thiamine 100 milliGRAM(s) Oral daily      Objective:  Vital Signs Last 24 Hrs  T(C): 36.7 (15 Oct 2021 16:00), Max: 36.7 (15 Oct 2021 16:00)  T(F): 98 (15 Oct 2021 16:00), Max: 98 (15 Oct 2021 16:00)  HR: 98 (15 Oct 2021 18:00) (65 - 113)  BP: --  BP(mean): --  RR: 18 (15 Oct 2021 18:00) (12 - 36)  SpO2: 100% (15 Oct 2021 18:00) (97% - 100%)    PHYSICAL EXAM:  Constitutional:no acute distress  Eyes:ALONSO, EOMI  Ear/Nose/Throat: no oral lesions, 	  Respiratory: clear BL  Cardiovascular: S1S2  Gastrointestinal:soft, (+) BS, no tenderness  Extremities:no e/e/c  No Lymphadenopathy  IV sites not inflammed.    LABS:                        10.4   13.29 )-----------( 145      ( 15 Oct 2021 01:14 )             33.3     10-15    132<L>  |  97  |  4<L>  ----------------------------<  122<H>  4.6   |  24  |  0.36<L>    Ca    9.4      15 Oct 2021 13:21  Phos  2.1     10-15  Mg     1.7     10-15    TPro  7.3  /  Alb  4.0  /  TBili  1.5<H>  /  DBili  x   /  AST  24  /  ALT  33  /  AlkPhos  136<H>  10-15          MICROBIOLOGY:            RECENT CULTURES:  10-14 @ 09:38  .Sputum Sputum  --  --  --    Moderate Stenotrophomonas maltophilia Susceptibility to follow.  Normal Respiratory Bria absent  --  10-14 @ 05:45  .Blood Blood  --  --  --    No growth to date.  --      RADIOLOGY & ADDITIONAL STUDIES:  < from: Xray Chest 1 View- PORTABLE-Routine (Xray Chest 1 View- PORTABLE-Routine in AM.) (10.15.21 @ 03:14) >  IMPRESSION:  All lines and tubes as above.    LVAD obscures left costophrenic angle. Otherwise clear lungs.    < end of copied text >   INFECTIOUS DISEASES FOLLOW UP-- Anitra Prater  [phone]    This is a follow up note for this  66yMale with  Anemia    Acute cholecystitis        ROS:  CONSTITUTIONAL: awake and alert    Allergies    Amiodarone Hydrochloride (Other (Severe))    Intolerances        ANTIBIOTICS/RELEVANT:  antimicrobials  trimethoprim  40 mG/sulfamethoxazole 200 mG Suspension 320 milliGRAM(s) Oral every 8 hours  vancomycin    Solution 125 milliGRAM(s) Oral every 12 hours  vancomycin  IVPB 1000 milliGRAM(s) IV Intermittent every 12 hours    immunologic:    OTHER:  acetaminophen    Suspension .. 650 milliGRAM(s) Enteral Tube every 6 hours PRN  chlorhexidine 0.12% Liquid 15 milliLiter(s) Oral Mucosa two times a day  chlorhexidine 2% Cloths 1 Application(s) Topical <User Schedule>  dextrose 5% + sodium chloride 0.45% with potassium chloride 10 mEq/L 1000 milliLiter(s) IV Continuous <Continuous>  insulin lispro (ADMELOG) corrective regimen sliding scale   SubCutaneous every 6 hours  methimazole 20 milliGRAM(s) Oral <User Schedule>  metoclopramide Injectable 10 milliGRAM(s) IV Push every 8 hours  multivitamin 1 Tablet(s) Oral daily  ondansetron Injectable 4 milliGRAM(s) IV Push every 8 hours PRN  pantoprazole  Injectable 40 milliGRAM(s) IV Push every 12 hours  predniSONE   Tablet 30 milliGRAM(s) Oral daily  simethicone 80 milliGRAM(s) Chew every 8 hours PRN  spironolactone 12.5 milliGRAM(s) Oral every 12 hours  thiamine 100 milliGRAM(s) Oral daily      Objective:  Vital Signs Last 24 Hrs  T(C): 36.7 (15 Oct 2021 16:00), Max: 36.7 (15 Oct 2021 16:00)  T(F): 98 (15 Oct 2021 16:00), Max: 98 (15 Oct 2021 16:00)  HR: 98 (15 Oct 2021 18:00) (65 - 113)  BP: --  BP(mean): --  RR: 18 (15 Oct 2021 18:00) (12 - 36)  SpO2: 100% (15 Oct 2021 18:00) (97% - 100%)    PHYSICAL EXAM:  Constitutional:no acute distress  Eyes:ALONSO, EOMI  Ear/Nose/Throat: no oral lesions, trach site no erythema	  Respiratory: audible bilat clear  Cardiovascular: VAD sounds  Gastrointestinal:biliary drainage in cholecystotomy tube  Extremities:no e/e/c  No Lymphadenopathy  IV sites not inflammed.    LABS:                        10.4   13.29 )-----------( 145      ( 15 Oct 2021 01:14 )             33.3     10-15    132<L>  |  97  |  4<L>  ----------------------------<  122<H>  4.6   |  24  |  0.36<L>    Ca    9.4      15 Oct 2021 13:21  Phos  2.1     10-15  Mg     1.7     10-15    TPro  7.3  /  Alb  4.0  /  TBili  1.5<H>  /  DBili  x   /  AST  24  /  ALT  33  /  AlkPhos  136<H>  10-15          MICROBIOLOGY:            RECENT CULTURES:  10-14 @ 09:38  .Sputum Sputum  --  --  --    Moderate Stenotrophomonas maltophilia Susceptibility to follow.  Normal Respiratory Bria absent  --  10-14 @ 05:45  .Blood Blood  --  --  --    No growth to date.  --      RADIOLOGY & ADDITIONAL STUDIES:  < from: Xray Chest 1 View- PORTABLE-Routine (Xray Chest 1 View- PORTABLE-Routine in AM.) (10.15.21 @ 03:14) >  IMPRESSION:  All lines and tubes as above.    LVAD obscures left costophrenic angle. Otherwise clear lungs.    < end of copied text >

## 2021-10-15 NOTE — PROGRESS NOTE ADULT - PROBLEM SELECTOR PLAN 7
- Has been transfused multiple times throughout this admission, last transfusion was on 10/5  - CBC daily.

## 2021-10-15 NOTE — GOALS OF CARE CONVERSATION - ADVANCED CARE PLANNING - CONVERSATION DETAILS
Enrike Gonzalez  [phone]  Once again. I d/w the patient and his sister about current plan of care for trying to resolve his SMA stenosis via a Sx approach. This time the patient indicated (actually verbalized) that in a common agreement with his family, he agreed with his family decision for a Sx approach to try to resolve/improve his SMA stenosis which they hope will improve his GI symptoms (nausea, vomiting, poor po tolerance which are maybe also leading to aspiration episodes). He also agreed with his HCP signing consents for the proposed vacular procedure. He and his sister still have questions about the duration of the Sx and the date and time when it will be happening and I already asked vascular to discuss about that with the patient and his sister. At this point is up  to vascular Sx to f/u with the patient's sister in order to move forward with the plan of care as indicated above.     I spent a total time of 26  mins with the patient at bedside of which more than 50% of time was spent on counseling/coordination of care.         Francisco Burgos MD   Geriatrics and Palliative Care ([facility]) Consult Service    of Geriatric and Palliative Medicine  [facility]      Please page the following number for clinical matters between the hours of 9 am and 5 pm from Monday through Friday : [phone]    After 5pm and on weekends, please see the contact information below:    In the event of newly developing, evolving, or worsening symptoms, please contact the Palliative Medicine team via pager (if the patient is at [facility] #[phone] or if the patient is at [facility] #[phone]) The Geriatric and Palliative Medicine service has coverage 24 hours a day/ 7 days a week to provide medical recommendations regarding symptom management needs via telephone

## 2021-10-15 NOTE — PROGRESS NOTE ADULT - PROBLEM SELECTOR PLAN 3
- Vascular surgery following, may reattempt via subclavian approach, patient actually agreeable to doing intervention, please discuss w/ vascular surgery regarding timing and consent

## 2021-10-15 NOTE — CHART NOTE - NSCHARTNOTEFT_GEN_A_CORE
Nutrition Chart Note. Pt at risk for refeeding Syndrome    Chart reviewed, events noted. 66M, PMH ACC/AHA stage D HF 2/2 NICM s/p HM2 LVAD (2017). Here initially for GIB prolonged hospital course, s/p tracheostomy, acalculous cholecystitis s/p perc dawn. Patient c persistent intermittent abdominal pain - found with SMA stenosis on mesenteric angiogram on 9/10, however, unable to place stent. CTA Abd/Pelvis () reveals L RP hematoma. Was tolerating TC and decannulated , transferred to [place] , had multiple episodes of vomiting and went into acute respiratory distress to which he was subsequently intubated. CT Scan  showed multifocal PNA; recultured on  and positive enterobacter cloacae - on abx. S/p trach placement 10/4. Vascular re-consulted regarding SMA stenosis stenting - pt refusing procedure - ethics & BH following. Family meeting scheduled again for today (10/15)     Source: EMR, Team    Diet, NPO with Tube Feed:   Tube Feeding Modality: Nasogastric  Vital 1.5 Tank (VITAL1.5RTH)  Total Volume for 24 Hours (mL): 960  Continuous  Starting Tube Feed Rate {mL per Hour}: 20  Increase Tube Feed Rate by (mL): 5     Every 6 hours  Until Goal Tube Feed Rate (mL per Hour): 40  Tube Feed Duration (in Hours): 24  Tube Feed Start Time: 10:00 (21 @ 10:11)    EN order provides: 1440kcals, 65g protein, 733ml free H2O  * Team aware goal is 65ml/hr, however, pt has not been able to advance to goal of 40  - EN Currently not infusing & has been held since 10/8 @ 22:00    Expressed to Team during rounds this AM pt at high risk for Refeeding Syndrome as he has not received EN in ~ 1 week. Was receiving Dex5%+NaCl infusing @50ml/hr - however, now decreased to 30ml/hr after RD recommendations to lower after observed with electrolyte abnormalities.  Electrolytes reviewed from this AM (10/15) - K (3.4) <L>, Phos (1.9) <L> & Mg (1.7) WNL (low end) - all replenished this AM. RD asked to continue trending electrolytes (specifically K+, Phos, and Mg) and replenishing aggressively.     GI:  Last BM 10/14.   Bowel Regimen? [] Yes   [x] No  - Emesis noted 10/11 & 10/13  - Noted on abx course at this time  - Reglan ordered as well as Zofran (PRN)    Weight in k.9 (10-), 57.3 (10-13), 58.3 (10-12), 57.9 (10-11), 57.6 (10-09), 56.3 (10-07), 59.2 (10-06), 62.1 (10-05), 59.8 (10-04), 59.8 (10-04), 69.8 (10-03), 62.7 (10-02), 60 (10-01), 61.9 (), 67.8 (), 62.1 (), 58.1 (), 65.7 (09-15), 58.4 (), 66.2 (), 71.2 ()    Weight history:   - Admission weight: 176.3 pounds / 80 kg (6/15)    Estimated Nutrition Needs:   Using dosing weight 78.5 kg  Energy Needs (25-30 kcal/kg): 8807-1755 kcal/day  Protein Needs (1.2-1.4 g/kg):     Previous Nutrition Diagnosis: Severe chronic malnutrition  Nutrition diagnosis is ongoing & addressed with tube feeds as tolerated    No new nutrition diagnosis at this time    Recommendations:    1. Defer nutrition advancement to team and GOC.   - If EN resumed recommend as medically feasible, initiate Vital 1.5 @ 10ml and increase by 10ml q24hrs as tolerated (and pending electrolytes WNL) to goal rate of 65ml/hr x 24hrs. At goal to provide 1560ml formula, 2340kcals, 105g protein, 1192ml free H2O (meets ~30kcals/kg & 1.33g protein/kg based on dosing wt 78.5kg).  - May consider alternative means of nutrition/hydration pending within GOC  2. Continue micronutrient supplementation as ordered  3. Monitor electrolytes and replenish (specifically K+, Phos, and Mg) and replenishing aggressively. Consider decreasing Dextrose infusion as pt at high risk for refeeding syndrome - discussed with team  4. RD to remain available to adjust EN formulary, volume/rate PRN.     Monitoring and Evaluation:   Continue to monitor Nutritional intake, Tolerance to diet prescription, weights, labs, skin integrity  RD remains available upon request and will follow up per protocol     Wendy Travis MS, RD, CDN, CNSC  pager #[phone]

## 2021-10-15 NOTE — PROGRESS NOTE ADULT - PROBLEM SELECTOR PLAN 1
- ACC/AHA stage D systolic heart failure s/p LVAD   - stable without evidence of device malfunction  - MAP goal 70, currently off pressors  - c/w Aldactone 12.5 mg PO BID.

## 2021-10-15 NOTE — PROGRESS NOTE ADULT - SUBJECTIVE AND OBJECTIVE BOX
RICKY JOINT  MRN#: [id number]  Subjective:  Pulmonary progress  : recurrent Acute hypoxic respiratory Failure ,aspiration pneumonia, NICM  ,   65M PMH ACC/AHA stage D HF due to NICM HM2 LVAD , TV annuloplasty ring 17 as destination therapy due to severe peripheral artery disease with significant stenosis  SIADH, Depression, CKD-3 with hyperkalemia, past E. coli UTIs, driveline drainage (21) and COVID-19 (back in 2020)  He was recently seen in clinic where he complained of abdominal pain and dark stools w constipation back in May. He presents to [facility] ER today weakness and fatigue, moderate and + Black stools for three days, on coumadin secondary to warfarin use in the setting of an LVAD. Patient has required transfusions for GIB in the past. Mostly recently back in 2021 pt had anemia with dark stools. No interventions was done at that time. However Last Endoscopy was done in 2020 (negative). Today labs show patient is anemic with H/H of 4.5/16.3,. INR is 8.84 MAP in the 90s, Temp 35.1. He denies any chest pain, shortness of breath, dizziness, abd pain, nausea or vomiting. found to have  rectal bleeding underwent endoscopy ,old blood in the proximal ileum ,  develop sepsis with LL opacity given Antibiotics , Extubated , reintubated , Bronchoscopy on Zosyn for LL pneumonia  and Amiodarone S/P TV Annuloplasty , patient remain intubated on full ventilatory support .S/P multiple units of blood transfusion , remain on full ventilatory support on Precedex and propofol , new central IJ line , diarrhea C diff. +ve on po vancomycin and IV Flagyl,  mildly distended belly , fever start on cefepime 2gm q 8 hrs S/P tracheostomy .  new RT Subclavian central line continue on contact  isolation ,S/P  C-diff antibiotics, no more diarrhea back on full support mechanical ventilator , chest x ray show improvement in LLL air space disease, more awake and responsive on tube feeding no more diarrhea ,  no nausea or vomiting or diarrhea still very weak and tired , back on tube feeding ,still on po vancomycin , getting PT and OT at bed side ,   , no SOB getting stronger , improve muscle tone patient transfer to monitor bed still on contact isolation for C-Difficel colitis on 50% FI02, and change to Suresh  tube feeding still loose stool . H/H drop significantly require blood transfusion , most likely GI bleeding , IV heparin D/C ,  H/H is stable ., patient develop TR sided  pneumothorax require chest tube placement , RT IJ central line  placed , develop fever shaking chills , blood culture positive for serratia marcescens , start on cefepime .the patient  become hypoxic and hypotensive placed on full ventilatory support and Vasopressin , levophed and Dobutamine ,S/P blood transfusion on meropenem and vancomycin ,   , on and  off pressors , occasional agitation on Precedex .S/P IR cholecystostomy tube drainage placement in the RT upper Quadrant , resume anticoagulation chest x ray noted C-PAP trail lasted only for 2 hrs , new RT SC line and D/C RT IJ line , RT pig tail cathter has been removed , tolerating C-PAP trial placed on trach. collar 50% FI02 GI consultant noted on NG tube feeding as tolerated , develop AF RVR S/P  bolus Amiodarone  back to regular sinus Rhythm , Flat Affect depressed , back on tube feeding Vital AF at 60 cc/ hr .still intermittent abdominal pain , no fever saturation is accepted  back on full ventilatory support ,   on methimazole for hyperthyroidism ,  condition is the same ,still C/O Abdominal pain , white count is improving , no chest pain or SOB ,  .placed on Reglan 10 mg TID for gastric motility , depressed , withdrawn. , S/P  mesenteric angiogram , unable to stent SMA S/P 3 units of blood transfusion   RT IJ in place reassessed for stent in the SMA by vascular,  dark stool stable H/H ,surgical opinion for possible Lap cholecystectomy. over night events noted develop respiratory distress, , rectal bleeding, require intubation placed on full ventilatory support , FI02 is 50% also became hemianosmically unstable require triple pressor support with levophed , vasopressin and norsynephrine, pan culture placed  on Vancomycin IV and PO  and Zosyn, sedated with propofol kept NPO , new central line RT and left IJ cathter placed . Diflucan Added .fever of 38.5 weaned off  vasopressin , all culture are negative, resume tube feeding ,  white count is improving , on meropenem,  fever adding IV vancomycin and  vancomycin solution  , and Bactrim , S/P  tracheostomy , bleeding receiving blood transfusion on vasopressin , no more bleeding , no fever , more awake and responsive ,tolerating tube feeding , ID and heart failure follow up appreciated , depresses no weaning for now , magnesium is low to give supplement too keep Above 2 , potassium is low to give supplement , patient refuse vascular procedure for superior mesenteric artery occlusion C-PAP trial to mei cabrera      (2021 16:57)    PAST MEDICAL & SURGICAL HISTORY:  CHF (congestive heart failure)    CAD (coronary artery disease)  Depression    Pleural effusion    History of 2019 novel coronavirus disease (COVID-19)  2020    Hemorrhoids    Bleeding hemorrhoids    Peripheral arterial disease    Claudication    BPH with urinary obstruction    ACC/AHA stage D systolic heart failure  Anticoagulation goal of INR 2.0 to 2.5    Falls    Clavicle fracture    CKD (chronic kidney disease), stage III    Iron deficiency anemia    H/O epistaxis    Vertigo    GI bleed    S/P TVR (tricuspid valve repair)    S/P ventricular assist device    S/P endoscopy    OBJECTIVE:  ICU Vital Signs Last 24 Hrs  T(C): 38.2 (2021 10:00), Max: 38.5 (2021 12:00)  T(F): 100.8 (2021 10:00), Max: 101.3 (2021 12:00)  HR: 65 (2021 10:00) (61 - 69)  BP: --  BP(mean): --  ABP: 105/67 (2021 10:00) (90/54 - 113/64)  ABP(mean): 77 (2021 10:00) (63 - 77)  RR: 20 (2021 10:00) (19 - 35)  SpO2: 99% (2021 10:00) (96% - 100%)       @ :  -   @ 07:00  --------------------------------------------------------  IN: 2693.9 mL / OUT: 1415 mL / NET: 1278.9 mL     @ 07: @ 10:49  --------------------------------------------------------  IN: 420.8 mL / OUT: 115 mL / NET: 305.8 mL  PHYSICAL EXAM: Daily   Elderly male  on trach. collar  FI02 is 40% S/P tracheostomy   Daily Weight in k.4 (2021 00:00)  HEENT:     + NCAT  + EOMI  - Conjuctival edema   - Icterus   - Thrush   - ETT  + NGT/OGT  Neck:         + FROM  RT IJ , LT IJ  lines JVD  - Nodes - Masses + Mid-line trachea + Tracheostomy  Chest:            normal A-P diameter    Lungs:          + CTA   + Rhonchi    - Rales    - Wheezing + Decreased  LT BS   - Dullness R L  Cardiac:       + S1 + S2    + RRR   - Irregular   - S3  - S4    - Murmurs   - Rub   - Hamman’s sign   Abdomen:    + BS  + Soft + Non-tender - Distended - Organomegaly - PEG .cholecystostomy tube in place  Extremities:   - Cyanosis U/L   - Clubbing  U/L  + LE/UE Edema   + Capillary refill    + Pulses   Neuro:        - Awake   -  Alert   - Confused   - Lethargic   + Sedated  + Generalized Weakness  Skin:        - Rashes    - Erythema   + Normal incisions   + IV sites intact          HOSPITAL MEDICATIONS: All medications reviewed and analyzed  MEDICATIONS  (STANDING):  amiodarone    Tablet 200 milliGRAM(s) Oral daily  chlorhexidine 0.12% Liquid 15 milliLiter(s) Oral Mucosa every 12 hours  chlorhexidine 2% Cloths 1 Application(s) Topical <User Schedule>  dexmedetomidine Infusion 0.5 MICROgram(s)/kG/Hr (9.81 mL/Hr) IV Continuous <Continuous>  dextrose 50% Injectable 50 milliLiter(s) IV Push every 15 minutes  heparin  Infusion 400 Unit(s)/Hr (12.5 mL/Hr) IV Continuous <Continuous>  Hydromorphone  Injectable 0.5 milliGRAM(s) IV Push once  insulin lispro (ADMELOG) corrective regimen sliding scale   SubCutaneous every 6 hours  pantoprazole  Injectable 40 milliGRAM(s) IV Push every 12 hours  piperacillin/tazobactam IVPB.. 3.375 Gram(s) IV Intermittent every 8 hours  propofol Infusion 20 MICROgram(s)/kG/Min (9.42 mL/Hr) IV Continuous <Continuous>  sodium chloride 0.9% lock flush 3 milliLiter(s) IV Push every 8 hours  sodium chloride 0.9%. 1000 milliLiter(s) (10 mL/Hr) IV Continuous <Continuous>    MEDICATIONS  (PRN):  acetaminophen    Suspension .. 650 milliGRAM(s) Oral every 6 hours PRN Temp greater or equal to 38C (100.4F)    LABS: All Lab data reviewed and analyzed                        10.4   13.29 )-----------( 145      ( 15 Oct 2021 01:14 )             33.3    10-15    132<L>  |  97  |  4<L>  ----------------------------<  122<H>  4.6   |  24  |  0.36<L>    Ca    9.4      15 Oct 2021 13:21  Phos  2.1     10-15  Mg     1.7     10-15    TPro  7.3  /  Alb  4.0  /  TBili  1.5<H>  /  DBili  x   /  AST  24  /  ALT  33  /  AlkPhos  136<H>  10-15    Ca    9.3      14 Oct 2021 01:11  Phos  1.6     10-14  Mg     1.3     10-14    TPro  7.5  /  Alb  3.8  /  TBili  1.5<H>  /  DBili  x   /  AST  41<H>  /  ALT  39  /  AlkPhos  149<H>  10-    Ca    9.5      13 Oct 2021 01:38  Phos  1.9     10-13  Mg     1.6     10-    TPro  7.3  /  Alb  3.7  /  TBili  1.2  /  DBili  x   /  AST  24  /  ALT  24  /  AlkPhos  139<H>  10-                                                                                                                                                                      PTT - ( 2021 04:52 )  PTT:45.2 sec LIVER FUNCTIONS - ( 2021 00:42 )  Alb: 3.4 g/dL / Pro: 6.7 g/dL / ALK PHOS: 213 U/L / ALT: 15 U/L / AST: 24 U/L / GGT: x           RADIOLOGY: - Reviewed and analyzed RT Pig tail cathter  , LVAD HM2, CT scan of abdomen reviewed result noted

## 2021-10-16 NOTE — PROGRESS NOTE ADULT - PROBLEM SELECTOR PLAN 5
- sputum culture positive for stenotrophomonas, now on bactrim vanc and PO vanc for cdif ppx  - most recent Bcx negative  - Prior cholecystitis w/ per dawn drain with bilious output.

## 2021-10-16 NOTE — PROGRESS NOTE ADULT - SUBJECTIVE AND OBJECTIVE BOX
Subjective:    Medications:  acetaminophen    Suspension .. 650 milliGRAM(s) Enteral Tube every 6 hours PRN  chlorhexidine 0.12% Liquid 15 milliLiter(s) Oral Mucosa two times a day  chlorhexidine 2% Cloths 1 Application(s) Topical <User Schedule>  dextrose 5% + sodium chloride 0.45% with potassium chloride 10 mEq/L 1000 milliLiter(s) IV Continuous <Continuous>  hydrocortisone sodium succinate Injectable 60 milliGRAM(s) IV Push every 12 hours  insulin lispro (ADMELOG) corrective regimen sliding scale   SubCutaneous every 6 hours  methimazole 20 milliGRAM(s) Oral <User Schedule>  metoclopramide Injectable 10 milliGRAM(s) IV Push every 8 hours  multivitamin 1 Tablet(s) Oral daily  ondansetron Injectable 4 milliGRAM(s) IV Push every 8 hours PRN  pantoprazole  Injectable 40 milliGRAM(s) IV Push every 12 hours  simethicone 80 milliGRAM(s) Chew every 8 hours PRN  spironolactone 12.5 milliGRAM(s) Oral every 12 hours  thiamine 100 milliGRAM(s) Oral daily  trimethoprim / sulfamethoxazole IVPB 300 milliGRAM(s) IV Intermittent every 6 hours  vancomycin    Solution 125 milliGRAM(s) Oral every 12 hours  vancomycin  IVPB 1000 milliGRAM(s) IV Intermittent every 12 hours      Physical Exam:    Vitals:  Vital Signs Last 24 Hours  T(C): 36.6 (10-16-21 @ 08:00), Max: 36.7 (10-15-21 @ 16:00)  HR: 91 (10-16-21 @ 08:35) (85 - 113)  BP:   RR: 20 (10-16-21 @ 08:35) (12 - 31)  SpO2: 100% (10-16-21 @ 08:35) (93% - 100%)    Weight in k.9 (10-16 @ 00:00)    I&O's Summary    15 Oct 2021 07:01  -  16 Oct 2021 07:00  --------------------------------------------------------  IN: 2771 mL / OUT: 3425 mL / NET: -654 mL    16 Oct 2021 07:01  -  16 Oct 2021 08:42  --------------------------------------------------------  IN: 30 mL / OUT: 280 mL / NET: -250 mL    Tele:    General: No distress. Comfortable.  HEENT: EOM intact.  Neck: Neck supple. JVP not elevated. No masses  Chest: Clear to auscultation bilaterally  CV: Normal S1 and S2. No murmurs, rub, or gallops. Radial pulses normal.  Abdomen: Soft, non-distended, non-tender  Skin: No rashes or skin breakdown  Neurology: Alert and oriented times three. Sensation intact  Psych: Affect normal    Labs:                        10.8   9.10  )-----------( 160      ( 16 Oct 2021 00:44 )             33.8     10-16    135  |  98  |  <4<L>  ----------------------------<  88  3.7   |  26  |  0.38<L>    Ca    9.5      16 Oct 2021 00:44  Phos  3.2     10-16  Mg     2.0     10-16    TPro  7.4  /  Alb  3.8  /  TBili  1.0  /  DBili  x   /  AST  14  /  ALT  26  /  AlkPhos  138<H>  10-16              Lactate Dehydrogenase, Serum: 218 U/L (10-16 @ 00:44)  Lactate Dehydrogenase, Serum: 232 U/L (10-15 @ 01:13)  Lactate Dehydrogenase, Serum: 217 U/L (10-14 @ 09:51)  Lactate Dehydrogenase, Serum: 364 U/L (10-14 @ 01:11)       Subjective:  - no acute events  - intermittent abdominal pain    Medications:  acetaminophen    Suspension .. 650 milliGRAM(s) Enteral Tube every 6 hours PRN  chlorhexidine 0.12% Liquid 15 milliLiter(s) Oral Mucosa two times a day  chlorhexidine 2% Cloths 1 Application(s) Topical <User Schedule>  dextrose 5% + sodium chloride 0.45% with potassium chloride 10 mEq/L 1000 milliLiter(s) IV Continuous <Continuous>  hydrocortisone sodium succinate Injectable 60 milliGRAM(s) IV Push every 12 hours  insulin lispro (ADMELOG) corrective regimen sliding scale   SubCutaneous every 6 hours  methimazole 20 milliGRAM(s) Oral <User Schedule>  metoclopramide Injectable 10 milliGRAM(s) IV Push every 8 hours  multivitamin 1 Tablet(s) Oral daily  ondansetron Injectable 4 milliGRAM(s) IV Push every 8 hours PRN  pantoprazole  Injectable 40 milliGRAM(s) IV Push every 12 hours  simethicone 80 milliGRAM(s) Chew every 8 hours PRN  spironolactone 12.5 milliGRAM(s) Oral every 12 hours  thiamine 100 milliGRAM(s) Oral daily  trimethoprim / sulfamethoxazole IVPB 300 milliGRAM(s) IV Intermittent every 6 hours  vancomycin    Solution 125 milliGRAM(s) Oral every 12 hours  vancomycin  IVPB 1000 milliGRAM(s) IV Intermittent every 12 hours      Physical Exam:    Vitals:  Vital Signs Last 24 Hours  T(C): 36.6 (10-16-21 @ 08:00), Max: 36.7 (10-15-21 @ 16:00)  HR: 91 (10-16-21 @ 08:35) (85 - 113)  BP: MAPs 73-84  RR: 20 (10-16-21 @ 08:35) (12 - 31)  SpO2: 100% (10-16-21 @ 08:35) (93% - 100%)    LVAD interrogation:  HM2  Speed 9200  Flow 5.5  Power 6  PI 5.8    Weight in k.9 (10-16 @ 00:00)    I&O's Summary    15 Oct 2021 07:01  -  16 Oct 2021 07:00  --------------------------------------------------------  IN: 2771 mL / OUT: 3425 mL / NET: -654 mL    16 Oct 2021 07:01  -  16 Oct 2021 08:42  --------------------------------------------------------  IN: 30 mL / OUT: 280 mL / NET: -250 mL    Tele:     Physical Exam  General: thin appear and frail   Neck: Neck supple. JVP not elevated. No masses  Chest: +trach, +vent sounds   CV: +VAD hum  Abdomen: Soft, non-distended, tenderness noted in epigastric region, +keotube, Biliary drain   Neurology: lethargic.     Labs:                        10.8   9.10  )-----------( 160      ( 16 Oct 2021 00:44 )             33.8     10-16    135  |  98  |  <4<L>  ----------------------------<  88  3.7   |  26  |  0.38<L>    Ca    9.5      16 Oct 2021 00:44  Phos  3.2     10-16  Mg     2.0     10-16    TPro  7.4  /  Alb  3.8  /  TBili  1.0  /  DBili  x   /  AST  14  /  ALT  26  /  AlkPhos  138<H>  10-16    Lactate Dehydrogenase, Serum: 218 U/L (10-16 @ 00:44)  Lactate Dehydrogenase, Serum: 232 U/L (10-15 @ 01:13)  Lactate Dehydrogenase, Serum: 217 U/L (10-14 @ 09:51)  Lactate Dehydrogenase, Serum: 364 U/L (10-14 @ 01:11)

## 2021-10-16 NOTE — PROGRESS NOTE ADULT - SUBJECTIVE AND OBJECTIVE BOX
RICKY JOINT  MRN#: [id number]  Subjective:  Pulmonary progress  : recurrent Acute hypoxic respiratory Failure ,aspiration pneumonia, NICM  ,   65M PMH ACC/AHA stage D HF due to NICM HM2 LVAD , TV annuloplasty ring 17 as destination therapy due to severe peripheral artery disease with significant stenosis  SIADH, Depression, CKD-3 with hyperkalemia, past E. coli UTIs, driveline drainage (21) and COVID-19 (back in 2020)  He was recently seen in clinic where he complained of abdominal pain and dark stools w constipation back in May. He presents to [facility] ER today weakness and fatigue, moderate and + Black stools for three days, on coumadin secondary to warfarin use in the setting of an LVAD. Patient has required transfusions for GIB in the past. Mostly recently back in 2021 pt had anemia with dark stools. No interventions was done at that time. However Last Endoscopy was done in 2020 (negative). Today labs show patient is anemic with H/H of 4.5/16.3,. INR is 8.84 MAP in the 90s, Temp 35.1. He denies any chest pain, shortness of breath, dizziness, abd pain, nausea or vomiting. found to have  rectal bleeding underwent endoscopy ,old blood in the proximal ileum ,  develop sepsis with LL opacity given Antibiotics , Extubated , reintubated , Bronchoscopy on Zosyn for LL pneumonia  and Amiodarone S/P TV Annuloplasty , patient remain intubated on full ventilatory support .S/P multiple units of blood transfusion , remain on full ventilatory support on Precedex and propofol , new central IJ line , diarrhea C diff. +ve on po vancomycin and IV Flagyl,  mildly distended belly , fever start on cefepime 2gm q 8 hrs S/P tracheostomy .  new RT Subclavian central line continue on contact  isolation ,S/P  C-diff antibiotics, no more diarrhea back on full support mechanical ventilator , chest x ray show improvement in LLL air space disease, more awake and responsive on tube feeding no more diarrhea ,  no nausea or vomiting or diarrhea still very weak and tired , back on tube feeding ,still on po vancomycin , getting PT and OT at bed side ,   , no SOB getting stronger , improve muscle tone patient transfer to monitor bed still on contact isolation for C-Difficel colitis on 50% FI02, and change to Suresh  tube feeding still loose stool . H/H drop significantly require blood transfusion , most likely GI bleeding , IV heparin D/C ,  H/H is stable ., patient develop TR sided  pneumothorax require chest tube placement , RT IJ central line  placed , develop fever shaking chills , blood culture positive for serratia marcescens , start on cefepime .the patient  become hypoxic and hypotensive placed on full ventilatory support and Vasopressin , levophed and Dobutamine ,S/P blood transfusion on meropenem and vancomycin ,   , on and  off pressors , occasional agitation on Precedex .S/P IR cholecystostomy tube drainage placement in the RT upper Quadrant , resume anticoagulation chest x ray noted C-PAP trail lasted only for 2 hrs , new RT SC line and D/C RT IJ line , RT pig tail cathter has been removed , tolerating C-PAP trial placed on trach. collar 50% FI02 GI consultant noted on NG tube feeding as tolerated , develop AF RVR S/P  bolus Amiodarone  back to regular sinus Rhythm , Flat Affect depressed , back on tube feeding Vital AF at 60 cc/ hr .still intermittent abdominal pain , no fever saturation is accepted  back on full ventilatory support ,   on methimazole for hyperthyroidism ,  condition is the same ,still C/O Abdominal pain , white count is improving , no chest pain or SOB ,  .placed on Reglan 10 mg TID for gastric motility , depressed , withdrawn. , S/P  mesenteric angiogram , unable to stent SMA S/P 3 units of blood transfusion   RT IJ in place reassessed for stent in the SMA by vascular,  dark stool stable H/H ,surgical opinion for possible Lap cholecystectomy. over night events noted develop respiratory distress, , rectal bleeding, require intubation placed on full ventilatory support , FI02 is 50% also became hemianosmically unstable require triple pressor support with levophed , vasopressin and norsynephrine, pan culture placed  on Vancomycin IV and PO  and Zosyn, sedated with propofol kept NPO , new central line RT and left IJ cathter placed . Diflucan Added .fever of 38.5 weaned off  vasopressin , all culture are negative, resume tube feeding ,  white count is improving , on meropenem,  fever adding IV vancomycin and  vancomycin solution  , and Bactrim , S/P  tracheostomy , bleeding receiving blood transfusion on vasopressin , no more bleeding , no fever , more awake and responsive ,tolerating tube feeding , ID and heart failure follow up appreciated , depresses no weaning for now , magnesium is low to give supplement too keep Above 2 , potassium is low to give supplement , patient refuse vascular procedure for superior mesenteric artery occlusion C-PAP trial to trach. collar , minimal secretion no hemoptysis      (2021 16:57)    PAST MEDICAL & SURGICAL HISTORY:  CHF (congestive heart failure)    CAD (coronary artery disease)  Depression    Pleural effusion    History of 2019 novel coronavirus disease (COVID-19)  2020    Hemorrhoids    Bleeding hemorrhoids    Peripheral arterial disease    Claudication    BPH with urinary obstruction    ACC/AHA stage D systolic heart failure  Anticoagulation goal of INR 2.0 to 2.5    Falls    Clavicle fracture    CKD (chronic kidney disease), stage III    Iron deficiency anemia    H/O epistaxis    Vertigo    GI bleed    S/P TVR (tricuspid valve repair)    S/P ventricular assist device    S/P endoscopy    OBJECTIVE:  ICU Vital Signs Last 24 Hrs  T(C): 38.2 (2021 10:00), Max: 38.5 (2021 12:00)  T(F): 100.8 (2021 10:00), Max: 101.3 (2021 12:00)  HR: 65 (2021 10:00) (61 - 69)  BP: --  BP(mean): --  ABP: 105/67 (2021 10:00) (90/54 - 113/64)  ABP(mean): 77 (2021 10:00) (63 - 77)  RR: 20 (2021 10:00) (19 - 35)  SpO2: 99% (2021 10:00) (96% - 100%)       @ 07:  -   @ 07:00  --------------------------------------------------------  IN: 2693.9 mL / OUT: 1415 mL / NET: 1278.9 mL     @ 07:01   @ 10:49  --------------------------------------------------------  IN: 420.8 mL / OUT: 115 mL / NET: 305.8 mL  PHYSICAL EXAM: Daily   Elderly male  on trach. collar  FI02 is 40% S/P tracheostomy   Daily Weight in k.4 (2021 00:00)  HEENT:     + NCAT  + EOMI  - Conjuctival edema   - Icterus   - Thrush   - ETT  + NGT/OGT  Neck:         + FROM  RT IJ , LT IJ  lines JVD  - Nodes - Masses + Mid-line trachea + Tracheostomy  Chest:            normal A-P diameter    Lungs:          + CTA   + Rhonchi    - Rales    - Wheezing + Decreased  LT BS   - Dullness R L  Cardiac:       + S1 + S2    + RRR   - Irregular   - S3  - S4    - Murmurs   - Rub   - Hamman’s sign   Abdomen:    + BS  + Soft + Non-tender - Distended - Organomegaly - PEG .cholecystostomy tube in place  Extremities:   - Cyanosis U/L   - Clubbing  U/L  + LE/UE Edema   + Capillary refill    + Pulses   Neuro:        - Awake   -  Alert   - Confused   - Lethargic   + Sedated  + Generalized Weakness  Skin:        - Rashes    - Erythema   + Normal incisions   + IV sites intact          HOSPITAL MEDICATIONS: All medications reviewed and analyzed  MEDICATIONS  (STANDING):  amiodarone    Tablet 200 milliGRAM(s) Oral daily  chlorhexidine 0.12% Liquid 15 milliLiter(s) Oral Mucosa every 12 hours  chlorhexidine 2% Cloths 1 Application(s) Topical <User Schedule>  dexmedetomidine Infusion 0.5 MICROgram(s)/kG/Hr (9.81 mL/Hr) IV Continuous <Continuous>  dextrose 50% Injectable 50 milliLiter(s) IV Push every 15 minutes  heparin  Infusion 400 Unit(s)/Hr (12.5 mL/Hr) IV Continuous <Continuous>  Hydromorphone  Injectable 0.5 milliGRAM(s) IV Push once  insulin lispro (ADMELOG) corrective regimen sliding scale   SubCutaneous every 6 hours  pantoprazole  Injectable 40 milliGRAM(s) IV Push every 12 hours  piperacillin/tazobactam IVPB.. 3.375 Gram(s) IV Intermittent every 8 hours  propofol Infusion 20 MICROgram(s)/kG/Min (9.42 mL/Hr) IV Continuous <Continuous>  sodium chloride 0.9% lock flush 3 milliLiter(s) IV Push every 8 hours  sodium chloride 0.9%. 1000 milliLiter(s) (10 mL/Hr) IV Continuous <Continuous>    MEDICATIONS  (PRN):  acetaminophen    Suspension .. 650 milliGRAM(s) Oral every 6 hours PRN Temp greater or equal to 38C (100.4F)    LABS: All Lab data reviewed and analyzed                        10.8   9.10  )-----------( 160      ( 16 Oct 2021 00:44 )             33.8   10-16    135  |  98  |  <4<L>  ----------------------------<  88  3.7   |  26  |  0.38<L>    Ca    9.5      16 Oct 2021 00:44  Phos  3.2     10-16  Mg     2.0     10-16    TPro  7.4  /  Alb  3.8  /  TBili  1.0  /  DBili  x   /  AST  14  /  ALT  26  /  AlkPhos  138<H>  10-    Ca    9.4      15 Oct 2021 13:21  Phos  2.1     10-15  Mg     1.7     10-15    TPro  7.3  /  Alb  4.0  /  TBili  1.5<H>  /  DBili  x   /  AST  24  /  ALT  33  /  AlkPhos  136<H>  10-15    Ca    9.3      14 Oct 2021 01:11  Phos  1.6     10-14  Mg     1.3     10-14    TPro  7.5  /  Alb  3.8  /  TBili  1.5<H>  /  DBili  x   /  AST  41<H>  /  ALT  39  /  AlkPhos  149<H>  10-14    Ca    9.5      13 Oct 2021 01:38  Phos  1.9     10-13  Mg     1.6     10-13    TPro  7.3  /  Alb  3.7  /  TBili  1.2  /  DBili  x   /  AST  24  /  ALT  24  /  AlkPhos  139<H>  10-                                                                                                                                                                      PTT - ( 2021 04:52 )  PTT:45.2 sec LIVER FUNCTIONS - ( 2021 00:42 )  Alb: 3.4 g/dL / Pro: 6.7 g/dL / ALK PHOS: 213 U/L / ALT: 15 U/L / AST: 24 U/L / GGT: x           RADIOLOGY: - Reviewed and analyzed RT Pig tail cathter  , LVAD HM2, CT scan of abdomen reviewed result noted

## 2021-10-16 NOTE — PROGRESS NOTE ADULT - ASSESSMENT
Assessment and Recommendation:   · Assessment	  Assessment and recommendation :  Recurrent Acute hypoxic respiratory Failure  FI02 is 40% S/P tracheostomy  on track. collar   Acute blood loss anemia S/P multiple  blood transfusion post tracheostomy   recurrent  septic shock  weaned off  vasopressin   pan culture, fever  on IV meropenem  ,IV vancomycin and  po vancomycin , IV bactrim   S/P cholecystostomy tube placement by IR     AF RVR back to regular sinus Rhythm   Non ischemic cardiomyopathy continue ACE inhibitor and B-Blockers   mesenteric ischemia failure to stent SMA , no plan for repeated trial    Stage D systolic heart failure S/P LVAD HM2   MH2 LVAD  with  TV Annuloplasty  Severe peripheral vascular disease   severe hyperglycemia on insulin coverage    Reglan 10 mg for Gastric Motility   hyperthyroidism on Methimazole 10mg TID   critical care polyneuropathy   Anemia of Acute blood Loss   severe protein caloric malnutrition   Chronic kidney disease stage III  Depressed and withdrawn   resume NG tube feeding   GI prophylaxis with PPI   Discussed with TCV team

## 2021-10-16 NOTE — PROGRESS NOTE ADULT - PROBLEM SELECTOR PLAN 2
- Stable w/o evidence of LVAD malfunction, settings as above  - Unable to tolerate AC or ECASA due to recurrent GI bleeding  - trend daily LDH, currently stable

## 2021-10-16 NOTE — PROGRESS NOTE ADULT - ASSESSMENT
67 YO M with a history of stage D NICM s/p HM2 on 9/2017 as DT (due to severe PAD) with TV ring, prior COVID-19 infection 4/2020, recurrent syncope post LVAD s/p ILR, and chronic abdominal pain with prior negative workup who was admitted 6/14/21 with symptomatic anemia with Hgb 4.5 in setting of INR 8.8 without hemodynamic instability and has since had a protracted hospitalization. He was transfused and underwent VCE which showed active bleeding in the mid small bowel but subsequent enteroscopy 6/15 did not reveal any active bleeding. He acutely decompensated after procedure with fever/hypertension, low flow alarms, and pulmonary infiltrate with hypoxia requiring intubation from probable aspiration PNA. He was unable to extubated and has since undergone tracheostomy but tolerating persistent trach collar and nearing ability for decannulation. 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 He underwent angiogram on 9/10, stent was unable to be placed and course was then complicated by RP bleed. He continued to have persistent leukocytosis and febrile episodes and was noted to have positive sputum culture for serratia marcescens and completed his course of abx. He was initially decannulated on 9/23. Recently he started having multiples of melena, emesis and went into acte respiratory distress and was ultimately re-intubated on 9/26.     He had blood cultures are positive for enterobacter cloacae/serratia, remains on IV antibiotics. He is s/p trach with ENT on 10/4. Sputum cultures positive for stenotrophomonas, started on bactrim. BCx negative. Plan for SMA stent on Monday.

## 2021-10-16 NOTE — PROGRESS NOTE ADULT - SUBJECTIVE AND OBJECTIVE BOX
RICKY HAMPTON  MRN-[id number]  Patient is a 66y old  Male who presents with a chief complaint of Anemia, Supratherapeutic INR, Dark Stools (16 Oct 2021 08:41)    HPI:  65M PMH ACC/AHA stage D HF due to NICM HM2 LVAD , TV annuloplasty ring 17 as destination therapy due to severe peripheral artery disease with significant stenosis  SIADH, Depression, CKD-3 with hyperkalemia, past E. coli UTIs, driveline drainage (21) and COVID-19 (back in 2020)  He was recently seen in clinic where he complained of abdominal pain and dark stools w constipation back in May. He presents to [facility] ER today weakness and fatigue, moderate and + Black stools for three days, on coumadin secondary to warfarin use in the setting of an LVAD. Patient has required transfusions for GIB in the past. Mostly recently back in 2021 pt had anemia with dark stools. No interventions was done at that time. However Last Endoscopy was done in 2020 (negative). Today labs show patient is anemic with H/H of 4.5/16.3,. INR is 8.84 MAP in the 90s, Temp 35.1. He denies any chest pain, shortness of breath, dizziness, abd pain, nausea or vomiting.       (2021 16:57)      Surgery/Hospital Course:   admit for melena w/ anemia, INR 8.84   6/15 Capsul study (+) for small bowel bleed, balloon endoscopy (old blood in prox ileum); post EGD - septic w/ L opacity, re-intubated for concern for aspiration, TTE (Mod MR, decrease biV w/ interventricular septum boweing towards R)   bronch    +C Diff    CT C/A/P: Fluid filled colon which may be 2/2 rapid transit. Small bilateral pleffs with associates. Compressive atelectasis New ISABELLE & LLL  parenchymal opacities, suspicious for pneumonia. Moderate stenosis in the proximal superior mesenteric artery.    #8 Shiley trach at bedside    LVAD speed increased to 9200   Bronch   TC since . Patient transferred to SDU.    INR today 2.64.  H&H 7.3/24 this AM.  Will repeat CBC at noon, and will send stool guaiac Patient with persistent abdominal tenderness, rate of tube feeds decreased.  No nausea/vomiting.     INR today 2.4. H&H 9.1/28.6 low flow overnight /N&V, refusing Tube feeds on D5 1/2 normal  @50 cc/hr   INR 2.69  H&H 7.7/.1 refusing Tube feeds on D5 1/2 normal  @50 cc/hr. This am + BM Melena Dr Oneill HF  aware- PRBC x1  GI team consulted -  NPO  plan on study in am-  D/w Dr Cadet Patient  to return to CTU for further management; 1PRBCS    Post op INR 2.2 today.  No bleeding. BC + for SM.  Pt is hypotensive requiring pressor and inotropic support.  ID follow up today on Cefepime will follow.   R PTC for PTX    CT C/A/P: sub q emphysema in R chest wall, GGO RUL, small ascites CTH negative; Abd US: GB thickening, pericholecystic fluid     Perchole drain in place continues to drain total output overnight 133.  Fever today 38.8    duplex LE negative    Patient with persistent abdominal pain, refusing tube feeds and medications, Psych consulted   CTA A/P ordered to r/o mesenteric ischemia 2/2 persistent anorexia, nausea, vomiting. Revealed:  Evaluation of the mesenteric vessels is limited by streak artifact from LVAD. There appears to be severe stenosis of the proximal SMA; abdominal mesenteric doppler is recommended for further evaluation. 2.  No small bowel findings to suggest acute mesenteric ischemia. 3.  Focal dissections involving the right and left common iliac arteries.  8/15: Cultures resulted BC 1/2 +GPC in clusters, SC enterobacter; mesenteric duplex: borderline stenosis of proximal SMA  : CT C/A/P noncon: Nondular opacities in R lung apex w/cavitation, abd nl  :  Continue current care, treatment of thyrotoxicosis with medications as per endocrine, d/c ABX as per team.    RUQ sono: Contracted gallbladder with cholecystostomy tube in place.  9/10 failed SMA stent, c/b RP bleed s/p 3U PRCB   CTA Abd/Pelvis L RP hematoma    Trach decannulated    intubated fro resp distress for increased WOB, LL pneumonia; CT C/A/P: progressive ISABELLE opacities and L consolidations, multifocal pneumonia    TTE (EF 25%, decreased RV, mod MR)   10/3 VT -> Lidocaine gtt   10/4 Trach size 6 DCT cuff  10/5 CT abd (neg for intra-abd abcess, severe stenosis of prox SMA and b/l iliac arteries)    Today:    REVIEW OF SYSTEMS:  No complaints at this time, intermittently complains of abdominal pain and nausea    ICU Vital Signs Last 24 Hrs  T(C): 36.6 (16 Oct 2021 08:00), Max: 36.7 (15 Oct 2021 16:00)  T(F): 97.8 (16 Oct 2021 08:00), Max: 98 (15 Oct 2021 16:00)  HR: 81 (16 Oct 2021 10:00) (80 - 113)  BP: --  BP(mean): --  ABP: 106/69 (16 Oct 2021 10:00) (79/61 - 118/77)  ABP(mean): 84 (16 Oct 2021 10:00) (68 - 95)  RR: 20 (16 Oct 2021 10:00) (12 - 31)  SpO2: 100% (16 Oct 2021 10:00) (93% - 100%)      Physical Exam:  Gen:  NAD, limited engagement to conversation  CNS: moves all extremities to command   Neck: no JVD, +trach, +central line   RES : coarse breath sounds, no wheezing    CVS: +LVAD hum   Abd: Soft. Sybil drain with bilious fluid. Positive BS throughout. Mild RUQ abdominal tenderness by perc sybil.  Skin: No rash, erythema, cyanosis.  Vasc: Warm and well-perfused.  Ext:  no edema  ============================I/O===========================   I&O's Detail    15 Oct 2021 07:01  -  16 Oct 2021 07:00  --------------------------------------------------------  IN:    dextrose 5% + sodium chloride 0.45% w/ Additives: 760 mL    Enteral Tube Flush: 711 mL    IV PiggyBack: 1300 mL  Total IN: 2771 mL    OUT:    Drain (mL): 375 mL    Miscellaneous Tube Feedin mL    Voided (mL): 3050 mL  Total OUT: 3425 mL    Total NET: -654 mL      16 Oct 2021 07:01  -  16 Oct 2021 10:29  --------------------------------------------------------  IN:    dextrose 5% + sodium chloride 0.45% w/ Additives: 90 mL  Total IN: 90 mL    OUT:    Miscellaneous Tube Feedin mL    Voided (mL): 500 mL  Total OUT: 500 mL    Total NET: -410 mL        ============================ LABS =========================                        10.8   9.10  )-----------( 160      ( 16 Oct 2021 00:44 )             33.8     10-    135  |  98  |  <4<L>  ----------------------------<  88  3.7   |  26  |  0.38<L>    Ca    9.5      16 Oct 2021 00:44  Phos  3.2     10-  Mg     2.0     10-    TPro  7.4  /  Alb  3.8  /  TBili  1.0  /  DBili  x   /  AST  14  /  ALT  26  /  AlkPhos  138<H>  10-    LIVER FUNCTIONS - ( 16 Oct 2021 00:44 )  Alb: 3.8 g/dL / Pro: 7.4 g/dL / ALK PHOS: 138 U/L / ALT: 26 U/L / AST: 14 U/L / GGT: x             ABG - ( 16 Oct 2021 00:14 )  pH, Arterial: 7.44  pH, Blood: x     /  pCO2: 47    /  pO2: 193   / HCO3: 32    / Base Excess: 6.8   /  SaO2: 99.8                ======================Micro/Rad/Cardio=================  Culture: Reviewed   CXR: Reviewed  Echo:Reviewed  ======================================================  PAST MEDICAL & SURGICAL HISTORY:  CHF (congestive heart failure)    CAD (coronary artery disease)    Depression    Pleural effusion    History of  novel coronavirus disease (COVID-19)  2020    Hemorrhoids    Bleeding hemorrhoids    Peripheral arterial disease    Claudication    BPH with urinary obstruction    ACC/AHA stage D systolic heart failure    Anticoagulation goal of INR 2.0 to 2.5    Falls    Clavicle fracture    CKD (chronic kidney disease), stage III    Iron deficiency anemia    H/O epistaxis    Vertigo    GI bleed    S/P TVR (tricuspid valve repair)    S/P ventricular assist device    S/P endoscopy      ====================ASSESSMENT ==============  Stage D Nonischemic Cardiomyopathy, Status Post HM2 on 2017    Cardiogenic shock  Hemodynamic instability   Acute hypoxemic respiratory failure s/p trach , decannulated on ; Reintubated on    GI bleed , Status Post Enteroscopy   Anemia, in setting of melena   Chronic Kidney Disease  Stress hyperglycemia   C.diff positive on    Hypovolemic shock  Septic shock  Leukocytosis  GB thickening/percholecystic s/p perc sybil by IR   SMA stenosis  Serratia/citrobacter pneumonia   Stenotrophomonas pneumonia   Enterobacter pneumonia   Nasuea/vomiting  Deconditioning     Plan:  ====================== NEUROLOGY=====================  Nonfocal, continue to monitor neuro status per ICU protocol.   Tylenol PRN for analgesia     acetaminophen    Suspension .. 650 milliGRAM(s) Enteral Tube every 6 hours PRN Mild Pain (1 - 3)    ==================== RESPIRATORY======================  Acute hypoxemic respiratory failure s/p #8 shiley trach on ; decannulated on ; Reintubated on ; trach size 6 DCT cuff on 10/4   Continue close monitoring of respiratory rate and breathing pattern, following of ABG’s with A-line monitoring, continuous pulse oximetry monitoring.   trach collar       FiO2: 40    ====================CARDIOVASCULAR==================  Stage D Nonischemic Cardiomyopathy, Status Post HM2 on 2017; LVAD settings 9200, flow 5.8  TTE 10/4:  Severely decreased LV systolic function, Diffuse hypokinesis. Mild AR. No pericardial effusion   VT on 10/4, s/p Lidocaine drip, continue close tele monitoring     ===================HEMATOLOGIC/ONC ===================  CTA A/P on  +L RP hematoma   Acute blood loss anemia, monitor H&H/Plts   Holding coumadin and ASA given recurrent GI bleeding, continue monitoring LDH     ===================== RENAL =========================  Hx of CKD, continue monitoring urine output, I&OS, BUN/Cr   Replete lytes PRN. Keep K> 4 and Mg >2  C/w Aldactone for diuresis    spironolactone 12.5 milliGRAM(s) Oral every 12 hours    ==================== GASTROINTESTINAL===================  TFs held in setting of ongoing nausea / recent emesis   CT A/P 10/5 neg for intra-abd abcess, severe stenosis of prox SMA and b/l iliac arteries  CTA A/P : Evaluation of the mesenteric vessels is limited by streak artifact from LVAD. There appears to be severe stenosis of the proximal SMA; abdominal mesenteric doppler is recommended for further evaluation. 2.  No small bowel findings to suggest acute mesenteric ischemia. 3.  Focal dissections involving the right and left common iliac arteries.  Stenosis of proximal SMA, s/p failed SMA stent c/b RP bleed on 9/10 - Pt deferred decision re: reop to HCP (Sister, Cristina Rudolph), who reportedly states she will consent to surgery, vascular to f/u   Reglan for gut motility      dextrose 5% + sodium chloride 0.45% with potassium chloride 10 mEq/L 1000 milliLiter(s) (30 mL/Hr) IV Continuous <Continuous>  multivitamin 1 Tablet(s) Oral daily  pantoprazole  Injectable 40 milliGRAM(s) IV Push every 12 hours  simethicone 80 milliGRAM(s) Chew every 8 hours PRN Gas  thiamine 100 milliGRAM(s) Oral daily  metoclopramide Injectable 10 milliGRAM(s) IV Push every 8 hours  ondansetron Injectable 4 milliGRAM(s) IV Push every 8 hours PRN Nausea and/or Vomiting    =======================    ENDOCRINE  =====================  Stress hyperglycemia, continue glucose control with admelog sliding scale   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   CT C/A/P : progressive ISABELLE opacities and L consolidations, multifocal pneumonia  BCx  + Serratia marcescens, BCx 1/2 on 10/1 +Enterobacter cloacae complex  BCx 10/5 + 10/7 NGTD   Vancomycin solution for C. diff prophylaxis        trimethoprim / sulfamethoxazole IVPB 300 milliGRAM(s) IV Intermittent every 6 hours  vancomycin    Solution 125 milliGRAM(s) Oral every 12 hours  vancomycin  IVPB 1000 milliGRAM(s) IV Intermittent every 12 hours      Patient requires continuous monitoring with bedside rhythm monitoring, pulse ox monitoring, and intermittent blood gas analysis. Care plan discussed with ICU care team. Patient remained critical and at risk for life threatening decompensation.     By signing my name below, I, Aparna Saleh, attest that this documentation has been prepared under the direction and in the presence of CLAUDIA Alfaro   Electronically signed: Cesia Villegas, 10-16-21 @ 10:29    I, Bertrand Turk, personally performed the services described in this documentation. all medical record entries made by the scribe were at my direction and in my presence. I have reviewed the chart and agree that the record reflects my personal performance and is accurate and complete  Electronically signed: CLAUDIA Alfaro        RICKY HAMPTON  MRN-[id number]  Patient is a 66y old  Male who presents with a chief complaint of Anemia, Supratherapeutic INR, Dark Stools (16 Oct 2021 08:41)    HPI:  65M PMH ACC/AHA stage D HF due to NICM HM2 LVAD , TV annuloplasty ring 17 as destination therapy due to severe peripheral artery disease with significant stenosis  SIADH, Depression, CKD-3 with hyperkalemia, past E. coli UTIs, driveline drainage (21) and COVID-19 (back in 2020)  He was recently seen in clinic where he complained of abdominal pain and dark stools w constipation back in May. He presents to [facility] ER today weakness and fatigue, moderate and + Black stools for three days, on coumadin secondary to warfarin use in the setting of an LVAD. Patient has required transfusions for GIB in the past. Mostly recently back in 2021 pt had anemia with dark stools. No interventions was done at that time. However Last Endoscopy was done in 2020 (negative). Today labs show patient is anemic with H/H of 4.5/16.3,. INR is 8.84 MAP in the 90s, Temp 35.1. He denies any chest pain, shortness of breath, dizziness, abd pain, nausea or vomiting.       (2021 16:57)      Surgery/Hospital Course:   admit for melena w/ anemia, INR 8.84   6/15 Capsul study (+) for small bowel bleed, balloon endoscopy (old blood in prox ileum); post EGD - septic w/ L opacity, re-intubated for concern for aspiration, TTE (Mod MR, decrease biV w/ interventricular septum boweing towards R)   bronch    +C Diff    CT C/A/P: Fluid filled colon which may be 2/2 rapid transit. Small bilateral pleffs with associates. Compressive atelectasis New ISABELLE & LLL  parenchymal opacities, suspicious for pneumonia. Moderate stenosis in the proximal superior mesenteric artery.    #8 Shiley trach at bedside    LVAD speed increased to 9200   Bronch   TC since . Patient transferred to SDU.    INR today 2.64.  H&H 7.3/24 this AM.  Will repeat CBC at noon, and will send stool guaiac Patient with persistent abdominal tenderness, rate of tube feeds decreased.  No nausea/vomiting.     INR today 2.4. H&H 9.1/28.6 low flow overnight /N&V, refusing Tube feeds on D5 1/2 normal  @50 cc/hr   INR 2.69  H&H 7.7/.1 refusing Tube feeds on D5 1/2 normal  @50 cc/hr. This am + BM Melena Dr Oneill HF  aware- PRBC x1  GI team consulted -  NPO  plan on study in am-  D/w Dr Cadet Patient  to return to CTU for further management; 1PRBCS    Post op INR 2.2 today.  No bleeding. BC + for SM.  Pt is hypotensive requiring pressor and inotropic support.  ID follow up today on Cefepime will follow.   R PTC for PTX    CT C/A/P: sub q emphysema in R chest wall, GGO RUL, small ascites CTH negative; Abd US: GB thickening, pericholecystic fluid     Perchole drain in place continues to drain total output overnight 133.  Fever today 38.8    duplex LE negative    Patient with persistent abdominal pain, refusing tube feeds and medications, Psych consulted   CTA A/P ordered to r/o mesenteric ischemia 2/2 persistent anorexia, nausea, vomiting. Revealed:  Evaluation of the mesenteric vessels is limited by streak artifact from LVAD. There appears to be severe stenosis of the proximal SMA; abdominal mesenteric doppler is recommended for further evaluation. 2.  No small bowel findings to suggest acute mesenteric ischemia. 3.  Focal dissections involving the right and left common iliac arteries.  8/15: Cultures resulted BC 1/2 +GPC in clusters, SC enterobacter; mesenteric duplex: borderline stenosis of proximal SMA  : CT C/A/P noncon: Nondular opacities in R lung apex w/cavitation, abd nl  :  Continue current care, treatment of thyrotoxicosis with medications as per endocrine, d/c ABX as per team.    RUQ sono: Contracted gallbladder with cholecystostomy tube in place.  9/10 failed SMA stent, c/b RP bleed s/p 3U PRCB   CTA Abd/Pelvis L RP hematoma    Trach decannulated    intubated fro resp distress for increased WOB, LL pneumonia; CT C/A/P: progressive ISABELLE opacities and L consolidations, multifocal pneumonia    TTE (EF 25%, decreased RV, mod MR)   10/3 VT -> Lidocaine gtt   10/4 Trach size 6 DCT cuff  10/5 CT abd (neg for intra-abd abcess, severe stenosis of prox SMA and b/l iliac arteries)    Today:  - Continue to ambulate as tolerated   - Patient is OOBTC   - OR Monday w/ vascular surgery for SMA stent     REVIEW OF SYSTEMS:  No complaints at this time, intermittently complains of abdominal pain and nausea    ICU Vital Signs Last 24 Hrs  T(C): 36.6 (16 Oct 2021 08:00), Max: 36.7 (15 Oct 2021 16:00)  T(F): 97.8 (16 Oct 2021 08:00), Max: 98 (15 Oct 2021 16:00)  HR: 81 (16 Oct 2021 10:00) (80 - 113)  BP: --  BP(mean): --  ABP: 106/69 (16 Oct 2021 10:00) (79/61 - 118/77)  ABP(mean): 84 (16 Oct 2021 10:00) (68 - 95)  RR: 20 (16 Oct 2021 10:00) (12 - 31)  SpO2: 100% (16 Oct 2021 10:00) (93% - 100%)      Physical Exam:  Gen:  NAD, limited engagement to conversation  CNS: moves all extremities to command   Neck: no JVD, +trach, +central line   RES : coarse breath sounds, no wheezing    CVS: +LVAD hum   Abd: Soft. Sybil drain with bilious fluid. Positive BS throughout. Mild RUQ abdominal tenderness by perc sybil.  Skin: No rash, erythema, cyanosis.  Vasc: Warm and well-perfused.  Ext:  no edema  ============================I/O===========================   I&O's Detail    15 Oct 2021 07:01  -  16 Oct 2021 07:00  --------------------------------------------------------  IN:    dextrose 5% + sodium chloride 0.45% w/ Additives: 760 mL    Enteral Tube Flush: 711 mL    IV PiggyBack: 1300 mL  Total IN: 2771 mL    OUT:    Drain (mL): 375 mL    Miscellaneous Tube Feedin mL    Voided (mL): 3050 mL  Total OUT: 3425 mL    Total NET: -654 mL      16 Oct 2021 07:01  -  16 Oct 2021 10:29  --------------------------------------------------------  IN:    dextrose 5% + sodium chloride 0.45% w/ Additives: 90 mL  Total IN: 90 mL    OUT:    Miscellaneous Tube Feedin mL    Voided (mL): 500 mL  Total OUT: 500 mL    Total NET: -410 mL        ============================ LABS =========================                        10.8   9.10  )-----------( 160      ( 16 Oct 2021 00:44 )             33.8     10-16    135  |  98  |  <4<L>  ----------------------------<  88  3.7   |  26  |  0.38<L>    Ca    9.5      16 Oct 2021 00:44  Phos  3.2     10-16  Mg     2.0     10-    TPro  7.4  /  Alb  3.8  /  TBili  1.0  /  DBili  x   /  AST  14  /  ALT  26  /  AlkPhos  138<H>  10-    LIVER FUNCTIONS - ( 16 Oct 2021 00:44 )  Alb: 3.8 g/dL / Pro: 7.4 g/dL / ALK PHOS: 138 U/L / ALT: 26 U/L / AST: 14 U/L / GGT: x             ABG - ( 16 Oct 2021 00:14 )  pH, Arterial: 7.44  pH, Blood: x     /  pCO2: 47    /  pO2: 193   / HCO3: 32    / Base Excess: 6.8   /  SaO2: 99.8                ======================Micro/Rad/Cardio=================  Culture: Reviewed   CXR: Reviewed  Echo:Reviewed  ======================================================  PAST MEDICAL & SURGICAL HISTORY:  CHF (congestive heart failure)    CAD (coronary artery disease)    Depression    Pleural effusion    History of  novel coronavirus disease (COVID-19)  2020    Hemorrhoids    Bleeding hemorrhoids    Peripheral arterial disease    Claudication    BPH with urinary obstruction    ACC/AHA stage D systolic heart failure    Anticoagulation goal of INR 2.0 to 2.5    Falls    Clavicle fracture    CKD (chronic kidney disease), stage III    Iron deficiency anemia    H/O epistaxis    Vertigo    GI bleed    S/P TVR (tricuspid valve repair)    S/P ventricular assist device    S/P endoscopy      ====================ASSESSMENT ==============  Stage D Nonischemic Cardiomyopathy, Status Post HM2 on 2017    Cardiogenic shock  Hemodynamic instability   Acute hypoxemic respiratory failure s/p trach , decannulated on ; Reintubated on    GI bleed , Status Post Enteroscopy   Anemia, in setting of melena   Chronic Kidney Disease  Stress hyperglycemia   C.diff positive on    Hypovolemic shock  Septic shock  Leukocytosis  GB thickening/percholecystic s/p perc sybil by IR   SMA stenosis  Serratia/citrobacter pneumonia   Stenotrophomonas pneumonia   Enterobacter pneumonia   Nasuea/vomiting  Deconditioning     Plan:  ====================== NEUROLOGY=====================  Nonfocal, continue to monitor neuro status per ICU protocol.   Tylenol PRN for analgesia     acetaminophen    Suspension .. 650 milliGRAM(s) Enteral Tube every 6 hours PRN Mild Pain (1 - 3)    ==================== RESPIRATORY======================  Acute hypoxemic respiratory failure s/p #8 shiley trach on ; decannulated on ; Reintubated on ; trach size 6 DCT cuff on 10/4   Continue close monitoring of respiratory rate and breathing pattern, following of ABG’s with A-line monitoring, continuous pulse oximetry monitoring.   trach collar       FiO2: 40    ====================CARDIOVASCULAR==================  Stage D Nonischemic Cardiomyopathy, Status Post HM2 on 2017; LVAD settings 9200, flow 5.8  TTE 10/4:  Severely decreased LV systolic function, Diffuse hypokinesis. Mild AR. No pericardial effusion   VT on 10/4, s/p Lidocaine drip, continue close tele monitoring     ===================HEMATOLOGIC/ONC ===================  CTA A/P on  +L RP hematoma   Acute blood loss anemia, monitor H&H/Plts   Holding coumadin and ASA given recurrent GI bleeding, continue monitoring LDH     ===================== RENAL =========================  Hx of CKD, continue monitoring urine output, I&OS, BUN/Cr   Replete lytes PRN. Keep K> 4 and Mg >2  C/w Aldactone for diuresis    spironolactone 12.5 milliGRAM(s) Oral every 12 hours    ==================== GASTROINTESTINAL===================  TFs held in setting of ongoing nausea / recent emesis   CT A/P 10/5 neg for intra-abd abcess, severe stenosis of prox SMA and b/l iliac arteries  CTA A/P : Evaluation of the mesenteric vessels is limited by streak artifact from LVAD. There appears to be severe stenosis of the proximal SMA; abdominal mesenteric doppler is recommended for further evaluation. 2.  No small bowel findings to suggest acute mesenteric ischemia. 3.  Focal dissections involving the right and left common iliac arteries.  Stenosis of proximal SMA, s/p failed SMA stent c/b RP bleed on 9/10 - Pt deferred decision re: reop to HCP (Sister, Cristina Rudolph), who reportedly states she will consent to surgery, vascular to f/u   Reglan for gut motility      dextrose 5% + sodium chloride 0.45% with potassium chloride 10 mEq/L 1000 milliLiter(s) (30 mL/Hr) IV Continuous <Continuous>  multivitamin 1 Tablet(s) Oral daily  pantoprazole  Injectable 40 milliGRAM(s) IV Push every 12 hours  simethicone 80 milliGRAM(s) Chew every 8 hours PRN Gas  thiamine 100 milliGRAM(s) Oral daily  metoclopramide Injectable 10 milliGRAM(s) IV Push every 8 hours  ondansetron Injectable 4 milliGRAM(s) IV Push every 8 hours PRN Nausea and/or Vomiting    =======================    ENDOCRINE  =====================  Stress hyperglycemia, continue glucose control with admelog sliding scale   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   CT C/A/P : progressive ISABELLE opacities and L consolidations, multifocal pneumonia  BCx  + Serratia marcescens, BCx 1/2 on 10/1 +Enterobacter cloacae complex  BCx 10/5 + 10/7 NGTD   Vancomycin solution for C. diff prophylaxis        trimethoprim / sulfamethoxazole IVPB 300 milliGRAM(s) IV Intermittent every 6 hours  vancomycin    Solution 125 milliGRAM(s) Oral every 12 hours  vancomycin  IVPB 1000 milliGRAM(s) IV Intermittent every 12 hours      Patient requires continuous monitoring with bedside rhythm monitoring, pulse ox monitoring, and intermittent blood gas analysis. Care plan discussed with ICU care team. Patient remained critical and at risk for life threatening decompensation.     By signing my name below, I, Aparna Saleh, attest that this documentation has been prepared under the direction and in the presence of CLAUDIA Alfaro   Electronically signed: Cesia Villegas, 10-16-21 @ 10:29    I, Bertrand Turk, personally performed the services described in this documentation. all medical record entries made by the scribe were at my direction and in my presence. I have reviewed the chart and agree that the record reflects my personal performance and is accurate and complete  Electronically signed: CLAUDIA Alfaro        RICKY HAMPTON  MRN-[id number]  Patient is a 66y old  Male who presents with a chief complaint of Anemia, Supratherapeutic INR, Dark Stools (16 Oct 2021 08:41)    HPI:  65M PMH ACC/AHA stage D HF due to NICM HM2 LVAD , TV annuloplasty ring 17 as destination therapy due to severe peripheral artery disease with significant stenosis  SIADH, Depression, CKD-3 with hyperkalemia, past E. coli UTIs, driveline drainage (21) and COVID-19 (back in 2020)  He was recently seen in clinic where he complained of abdominal pain and dark stools w constipation back in May. He presents to [facility] ER today weakness and fatigue, moderate and + Black stools for three days, on coumadin secondary to warfarin use in the setting of an LVAD. Patient has required transfusions for GIB in the past. Mostly recently back in 2021 pt had anemia with dark stools. No interventions was done at that time. However Last Endoscopy was done in 2020 (negative). Today labs show patient is anemic with H/H of 4.5/16.3,. INR is 8.84 MAP in the 90s, Temp 35.1. He denies any chest pain, shortness of breath, dizziness, abd pain, nausea or vomiting.       (2021 16:57)      Surgery/Hospital Course:   admit for melena w/ anemia, INR 8.84   6/15 Capsul study (+) for small bowel bleed, balloon endoscopy (old blood in prox ileum); post EGD - septic w/ L opacity, re-intubated for concern for aspiration, TTE (Mod MR, decrease biV w/ interventricular septum boweing towards R)   bronch    +C Diff    CT C/A/P: Fluid filled colon which may be 2/2 rapid transit. Small bilateral pleffs with associates. Compressive atelectasis New ISABELLE & LLL  parenchymal opacities, suspicious for pneumonia. Moderate stenosis in the proximal superior mesenteric artery.    #8 Shiley trach at bedside    LVAD speed increased to 9200   Bronch   TC since . Patient transferred to SDU.    INR today 2.64.  H&H 7.3/24 this AM.  Will repeat CBC at noon, and will send stool guaiac Patient with persistent abdominal tenderness, rate of tube feeds decreased.  No nausea/vomiting.     INR today 2.4. H&H 9.1/28.6 low flow overnight /N&V, refusing Tube feeds on D5 1/2 normal  @50 cc/hr   INR 2.69  H&H 7.7/.1 refusing Tube feeds on D5 1/2 normal  @50 cc/hr. This am + BM Melena Dr Oneill HF  aware- PRBC x1  GI team consulted -  NPO  plan on study in am-  D/w Dr Cadet Patient  to return to CTU for further management; 1PRBCS    Post op INR 2.2 today.  No bleeding. BC + for SM.  Pt is hypotensive requiring pressor and inotropic support.  ID follow up today on Cefepime will follow.   R PTC for PTX    CT C/A/P: sub q emphysema in R chest wall, GGO RUL, small ascites CTH negative; Abd US: GB thickening, pericholecystic fluid     Perchole drain in place continues to drain total output overnight 133.  Fever today 38.8    duplex LE negative    Patient with persistent abdominal pain, refusing tube feeds and medications, Psych consulted   CTA A/P ordered to r/o mesenteric ischemia 2/2 persistent anorexia, nausea, vomiting. Revealed:  Evaluation of the mesenteric vessels is limited by streak artifact from LVAD. There appears to be severe stenosis of the proximal SMA; abdominal mesenteric doppler is recommended for further evaluation. 2.  No small bowel findings to suggest acute mesenteric ischemia. 3.  Focal dissections involving the right and left common iliac arteries.  8/15: Cultures resulted BC 1/2 +GPC in clusters, SC enterobacter; mesenteric duplex: borderline stenosis of proximal SMA  : CT C/A/P noncon: Nondular opacities in R lung apex w/cavitation, abd nl  :  Continue current care, treatment of thyrotoxicosis with medications as per endocrine, d/c ABX as per team.    RUQ sono: Contracted gallbladder with cholecystostomy tube in place.  9/10 failed SMA stent, c/b RP bleed s/p 3U PRCB   CTA Abd/Pelvis L RP hematoma    Trach decannulated    intubated fro resp distress for increased WOB, LL pneumonia; CT C/A/P: progressive ISABELLE opacities and L consolidations, multifocal pneumonia    TTE (EF 25%, decreased RV, mod MR)   10/3 VT -> Lidocaine gtt   10/4 Trach size 6 DCT cuff  10/5 CT abd (neg for intra-abd abcess, severe stenosis of prox SMA and b/l iliac arteries)    Today:  - Continue to ambulate as tolerated   - Patient is OOBTC   - OR Monday w/ vascular surgery for SMA stent     REVIEW OF SYSTEMS:  No complaints at this time, intermittently complains of abdominal pain and nausea    ICU Vital Signs Last 24 Hrs  T(C): 36.6 (16 Oct 2021 08:00), Max: 36.7 (15 Oct 2021 16:00)  T(F): 97.8 (16 Oct 2021 08:00), Max: 98 (15 Oct 2021 16:00)  HR: 81 (16 Oct 2021 10:00) (80 - 113)  BP: --  BP(mean): --  ABP: 106/69 (16 Oct 2021 10:00) (79/61 - 118/77)  ABP(mean): 84 (16 Oct 2021 10:00) (68 - 95)  RR: 20 (16 Oct 2021 10:00) (12 - 31)  SpO2: 100% (16 Oct 2021 10:00) (93% - 100%)      Physical Exam:  Gen:  NAD, limited engagement to conversation  CNS: moves all extremities to command   Neck: no JVD, +trach, +central line   RES : coarse breath sounds, no wheezing    CVS: +LVAD hum   Abd: Soft. Sybil drain with bilious fluid. Positive BS throughout. Mild RUQ abdominal tenderness by perc sybil.  Skin: No rash, erythema, cyanosis.  Vasc: Warm and well-perfused.  Ext:  no edema  ============================I/O===========================   I&O's Detail    15 Oct 2021 07:01  -  16 Oct 2021 07:00  --------------------------------------------------------  IN:    dextrose 5% + sodium chloride 0.45% w/ Additives: 760 mL    Enteral Tube Flush: 711 mL    IV PiggyBack: 1300 mL  Total IN: 2771 mL    OUT:    Drain (mL): 375 mL    Miscellaneous Tube Feedin mL    Voided (mL): 3050 mL  Total OUT: 3425 mL    Total NET: -654 mL      16 Oct 2021 07:01  -  16 Oct 2021 10:29  --------------------------------------------------------  IN:    dextrose 5% + sodium chloride 0.45% w/ Additives: 90 mL  Total IN: 90 mL    OUT:    Miscellaneous Tube Feedin mL    Voided (mL): 500 mL  Total OUT: 500 mL    Total NET: -410 mL        ============================ LABS =========================                        10.8   9.10  )-----------( 160      ( 16 Oct 2021 00:44 )             33.8     10-16    135  |  98  |  <4<L>  ----------------------------<  88  3.7   |  26  |  0.38<L>    Ca    9.5      16 Oct 2021 00:44  Phos  3.2     10-16  Mg     2.0     10-    TPro  7.4  /  Alb  3.8  /  TBili  1.0  /  DBili  x   /  AST  14  /  ALT  26  /  AlkPhos  138<H>  10-    LIVER FUNCTIONS - ( 16 Oct 2021 00:44 )  Alb: 3.8 g/dL / Pro: 7.4 g/dL / ALK PHOS: 138 U/L / ALT: 26 U/L / AST: 14 U/L / GGT: x             ABG - ( 16 Oct 2021 00:14 )  pH, Arterial: 7.44  pH, Blood: x     /  pCO2: 47    /  pO2: 193   / HCO3: 32    / Base Excess: 6.8   /  SaO2: 99.8                ======================Micro/Rad/Cardio=================  Culture: Reviewed   CXR: Reviewed  Echo:Reviewed  ======================================================  PAST MEDICAL & SURGICAL HISTORY:  CHF (congestive heart failure)    CAD (coronary artery disease)    Depression    Pleural effusion    History of  novel coronavirus disease (COVID-19)  2020    Hemorrhoids    Bleeding hemorrhoids    Peripheral arterial disease    Claudication    BPH with urinary obstruction    ACC/AHA stage D systolic heart failure    Anticoagulation goal of INR 2.0 to 2.5    Falls    Clavicle fracture    CKD (chronic kidney disease), stage III    Iron deficiency anemia    H/O epistaxis    Vertigo    GI bleed    S/P TVR (tricuspid valve repair)    S/P ventricular assist device    S/P endoscopy      ====================ASSESSMENT ==============  Stage D Nonischemic Cardiomyopathy, Status Post HM2 on 2017    Cardiogenic shock  Hemodynamic instability   Acute hypoxemic respiratory failure s/p trach , decannulated on ; Reintubated on    GI bleed , Status Post Enteroscopy   Anemia, in setting of melena   Chronic Kidney Disease  Stress hyperglycemia   C.diff positive on    Hypovolemic shock  Septic shock  Leukocytosis  GB thickening/percholecystic s/p perc sybil by IR   SMA stenosis  Serratia/citrobacter pneumonia   Stenotrophomonas pneumonia   Enterobacter pneumonia   Nasuea/vomiting  Deconditioning     Plan:  ====================== NEUROLOGY=====================  Nonfocal, continue to monitor neuro status per ICU protocol.   Tylenol PRN for analgesia     acetaminophen    Suspension .. 650 milliGRAM(s) Enteral Tube every 6 hours PRN Mild Pain (1 - 3)    ==================== RESPIRATORY======================  Acute hypoxemic respiratory failure s/p #8 shiley trach on ; decannulated on ; Reintubated on ; trach size 6 DCT cuff on 10/4   Continue close monitoring of respiratory rate and breathing pattern, following of ABG’s with A-line monitoring, continuous pulse oximetry monitoring.   trach collar       FiO2: 40    ====================CARDIOVASCULAR==================  Stage D Nonischemic Cardiomyopathy, Status Post HM2 on 2017; LVAD settings 9200, flow 5.8  TTE 10/4:  Severely decreased LV systolic function, Diffuse hypokinesis. Mild AR. No pericardial effusion   VT on 10/4, s/p Lidocaine drip, continue close tele monitoring     ===================HEMATOLOGIC/ONC ===================  CTA A/P on  +L RP hematoma   Acute blood loss anemia, monitor H&H/Plts   Holding coumadin and ASA given recurrent GI bleeding, continue monitoring LDH     ===================== RENAL =========================  Hx of CKD, continue monitoring urine output, I&OS, BUN/Cr   Replete lytes PRN. Keep K> 4 and Mg >2  C/w Aldactone for diuresis    spironolactone 12.5 milliGRAM(s) Oral every 12 hours    ==================== GASTROINTESTINAL===================  TFs held in setting of ongoing nausea / recent emesis   CT A/P 10/5 neg for intra-abd abcess, severe stenosis of prox SMA and b/l iliac arteries  CTA A/P : Evaluation of the mesenteric vessels is limited by streak artifact from LVAD. There appears to be severe stenosis of the proximal SMA; abdominal mesenteric doppler is recommended for further evaluation. 2.  No small bowel findings to suggest acute mesenteric ischemia. 3.  Focal dissections involving the right and left common iliac arteries.  Stenosis of proximal SMA, s/p failed SMA stent c/b RP bleed on 9/10 - Pt deferred decision re: reop to HCP (Sister, Cristina Rudolph), who reportedly states she will consent to surgery, vascular to f/u   Reglan for gut motility      dextrose 5% + sodium chloride 0.45% with potassium chloride 10 mEq/L 1000 milliLiter(s) (30 mL/Hr) IV Continuous <Continuous>  multivitamin 1 Tablet(s) Oral daily  pantoprazole  Injectable 40 milliGRAM(s) IV Push every 12 hours  simethicone 80 milliGRAM(s) Chew every 8 hours PRN Gas  thiamine 100 milliGRAM(s) Oral daily  metoclopramide Injectable 10 milliGRAM(s) IV Push every 8 hours  ondansetron Injectable 4 milliGRAM(s) IV Push every 8 hours PRN Nausea and/or Vomiting    =======================    ENDOCRINE  =====================  Stress hyperglycemia, continue glucose control with admelog sliding scale   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   CT C/A/P : progressive ISABELLE opacities and L consolidations, multifocal pneumonia  BCx  + Serratia marcescens, BCx 1/2 on 10/1 +Enterobacter cloacae complex  BCx 10/5 + 10/7 NGTD   Vancomycin solution for C. diff prophylaxis  Bactrim added for S. Maltophilia in sputum      trimethoprim / sulfamethoxazole IVPB 300 milliGRAM(s) IV Intermittent every 6 hours  vancomycin    Solution 125 milliGRAM(s) Oral every 12 hours  vancomycin  IVPB 1000 milliGRAM(s) IV Intermittent every 12 hours      Patient requires continuous monitoring with bedside rhythm monitoring, pulse ox monitoring, and intermittent blood gas analysis. Care plan discussed with ICU care team. Patient remained critical and at risk for life threatening decompensation.     By signing my name below, I, Aparna Saleh, attest that this documentation has been prepared under the direction and in the presence of CLAUDIA Alfaro   Electronically signed: Cesia Villegas, 10-16-21 @ 10:29    I, Bertrand Turk, personally performed the services described in this documentation. all medical record entries made by the luisibmel were at my direction and in my presence. I have reviewed the chart and agree that the record reflects my personal performance and is accurate and complete  Electronically signed: CLAUDIA Alfaro

## 2021-10-16 NOTE — CHART NOTE - NSCHARTNOTEFT_GEN_A_CORE
Reviewed chart and discussed patient's clinical status with CT ICU Team provider.  Patient with acute thyrotoxicosis, currently with tracheostomy, planned for SMA stent on 10/18/21, NPO on tube feeds  Recommend continue current regimen of methimazole and hydrocortisone  Check T4, FT4, TSH on 10/18/21 for endocrine team to adjust methimazole and hydrocortisone doses as clinically warranted  Please continue to check daily CBC and CMP to monitor for agranulocytosis and transaminitis from Methimazole therapy    D/w ICU Team  *Please note a different provider maybe managing this patient tomorrow/daily*. Please contact our office at [phone] regarding follow-up queries about this patient. For any endocrine emergencies, please state urgency when calling [phone] during business hours and to speak with on-call fellow after 5pm and on weekends.    Naila Harris DO  Pager: 9AM - 5PM (Mon-Fri): [phone]  After 5PM and on Weekends: [phone] Reviewed chart and discussed patient's clinical status with CT ICU Team provider.  Patient with acute thyrotoxicosis, currently with tracheostomy, planned for SMA stent on 10/18/21, NPO on tube feeds  Recommend continue current regimen of methimazole and hydrocortisone  Check T4, FT4, T3, FT4 by ED on 10/18/21 for endocrine team to adjust methimazole and hydrocortisone doses as clinically warranted  Please continue to check daily CBC and CMP to monitor for agranulocytosis and transaminitis from Methimazole therapy    D/w ICU Team  *Please note a different provider maybe managing this patient tomorrow/daily*. Please contact our office at [phone] regarding follow-up queries about this patient. For any endocrine emergencies, please state urgency when calling [phone] during business hours and to speak with on-call fellow after 5pm and on weekends.    Naila Harris DO  Pager: 9AM - 5PM (Mon-Fri): [phone]  After 5PM and on Weekends: [phone]

## 2021-10-16 NOTE — PROGRESS NOTE ADULT - ATTENDING COMMENTS
no events overnight.   plan for vascular procedure monday. continues to suffer from abdo pain. patient is not being fed.   no further fevers. BC negative. sputum 10.14 stenotrophonas, initiated on IV bactrim.   meds: IV bactrim, PO vanco, IV vanco. ald 12.5, reglan, PPI. methimazole. steriods.   LVAD flow 5.5, Power 6.0, PI 5.8  trach collar 40%  HR , MAPs 70-80. afebrile  I/O -650  10-16  135  |  98  |  <4<L>  ----------------------------<  88  3.7   |  26  |  0.38<L>    Ca    9.5      16 Oct 2021 00:44  Phos  3.2     10-16  Mg     2.0     10-16  TPro  7.4  /  Alb  3.8  /  TBili  1.0  /  DBili  x   /  AST  14  /  ALT  26  /  AlkPhos  138<H>  10-16                        10.8   9.10  )-----------( 160      ( 16 Oct 2021 00:44 )             33.8   .   status quo. no further fevers. positive sputum culture being treated.   for vascular procedure monday  Zi Werner

## 2021-10-17 NOTE — CHART NOTE - NSCHARTNOTEFT_GEN_A_CORE
Surgery Pre-op Note    Patient is a 66y old  Male who presents for aortogram, mesenteric angiogram    Vitals/Labs:  Vital Signs Last 24 Hrs  T(C): 36.5 (17 Oct 2021 08:00), Max: 36.5 (16 Oct 2021 20:00)  T(F): 97.7 (17 Oct 2021 08:00), Max: 97.7 (16 Oct 2021 20:00)  HR: 87 (17 Oct 2021 08:00) (71 - 104)  BP: --  BP(mean): --  RR: 17 (17 Oct 2021 08:00) (17 - 35)  SpO2: 100% (17 Oct 2021 08:00) (96% - 100%)    I&O's Detail    16 Oct 2021 07:01  -  17 Oct 2021 07:00  --------------------------------------------------------  IN:    dextrose 5% + sodium chloride 0.45% w/ Additives: 600 mL    Enteral Tube Flush: 150 mL    IV PiggyBack: 1650 mL  Total IN: 2400 mL    OUT:    Drain (mL): 120 mL    Miscellaneous Tube Feedin mL    Voided (mL): 1900 mL  Total OUT: 2020 mL    Total NET: 380 mL      17 Oct 2021 07:01  -  17 Oct 2021 08:50  --------------------------------------------------------  IN:    dextrose 5% + sodium chloride 0.45% w/ Additives: 60 mL  Total IN: 60 mL    OUT:    Voided (mL): 250 mL  Total OUT: 250 mL    Total NET: -190 mL                                10.8   8.34  )-----------( 190      ( 17 Oct 2021 00:32 )             33.6       10-17    134<L>  |  97  |  5<L>  ----------------------------<  98  4.3   |  24  |  0.46<L>    Ca    9.9      17 Oct 2021 00:30  Phos  2.3     10-17  Mg     1.6     10-17    TPro  7.7  /  Alb  4.0  /  TBili  0.9  /  DBili  x   /  AST  12  /  ALT  21  /  AlkPhos  149<H>  10-17      CAPILLARY BLOOD GLUCOSE      POCT Blood Glucose.: 89 mg/dL (17 Oct 2021 06:30)  POCT Blood Glucose.: 106 mg/dL (17 Oct 2021 00:02)  POCT Blood Glucose.: 86 mg/dL (16 Oct 2021 17:34)  POCT Blood Glucose.: 177 mg/dL (16 Oct 2021 13:11)      LIVER FUNCTIONS - ( 17 Oct 2021 00:30 )  Alb: 4.0 g/dL / Pro: 7.7 g/dL / ALK PHOS: 149 U/L / ALT: 21 U/L / AST: 12 U/L / GGT: x                       Imaging:   Chest X RAY:    EKG:          MEDICATIONS  (STANDING):  chlorhexidine 0.12% Liquid 15 milliLiter(s) Oral Mucosa two times a day  chlorhexidine 2% Cloths 1 Application(s) Topical <User Schedule>  dextrose 5% + sodium chloride 0.45% with potassium chloride 10 mEq/L 1000 milliLiter(s) (30 mL/Hr) IV Continuous <Continuous>  hydrocortisone sodium succinate Injectable 60 milliGRAM(s) IV Push every 12 hours  insulin lispro (ADMELOG) corrective regimen sliding scale   SubCutaneous every 6 hours  methimazole 20 milliGRAM(s) Oral <User Schedule>  metoclopramide Injectable 10 milliGRAM(s) IV Push every 8 hours  pantoprazole  Injectable 40 milliGRAM(s) IV Push every 12 hours  trimethoprim / sulfamethoxazole IVPB 300 milliGRAM(s) IV Intermittent every 6 hours  vancomycin    Solution 125 milliGRAM(s) Oral every 12 hours  vancomycin  IVPB 1000 milliGRAM(s) IV Intermittent every 12 hours    MEDICATIONS  (PRN):  ondansetron Injectable 4 milliGRAM(s) IV Push every 8 hours PRN Nausea and/or Vomiting    Assessment & Plan:  RICKY JOINT 66y Male for aorotogram, mesenteric angiogram  - NPO after midnight  - Please document clearance  - Please draw COVID  - CBC, BMP w/ mag and phos, PTT and PT/INR, type and screen    Vascular  9002

## 2021-10-17 NOTE — PROGRESS NOTE ADULT - SUBJECTIVE AND OBJECTIVE BOX
Subjective:    Medications:  chlorhexidine 0.12% Liquid 15 milliLiter(s) Oral Mucosa two times a day  chlorhexidine 2% Cloths 1 Application(s) Topical <User Schedule>  dextrose 5% + sodium chloride 0.45% with potassium chloride 10 mEq/L 1000 milliLiter(s) IV Continuous <Continuous>  hydrocortisone sodium succinate Injectable 60 milliGRAM(s) IV Push every 12 hours  insulin lispro (ADMELOG) corrective regimen sliding scale   SubCutaneous every 6 hours  methimazole 20 milliGRAM(s) Oral <User Schedule>  metoclopramide Injectable 10 milliGRAM(s) IV Push every 8 hours  ondansetron Injectable 4 milliGRAM(s) IV Push every 8 hours PRN  pantoprazole  Injectable 40 milliGRAM(s) IV Push every 12 hours  trimethoprim / sulfamethoxazole IVPB 300 milliGRAM(s) IV Intermittent every 6 hours  vancomycin    Solution 125 milliGRAM(s) Oral every 12 hours  vancomycin  IVPB 1000 milliGRAM(s) IV Intermittent every 12 hours      Physical Exam:    Vitals:  Vital Signs Last 24 Hours  T(C): 36.5 (10-17-21 @ 08:00), Max: 36.5 (10-16-21 @ 20:00)  HR: 87 (10-17-21 @ 10:00) (71 - 104)  BP: --  RR: 18 (10-17-21 @ 10:00) (17 - 35)  SpO2: 100% (10-17-21 @ 10:00) (96% - 100%)    Weight in k.6 (10-17 @ 00:00)    I&O's Summary    16 Oct 2021 07:01  -  17 Oct 2021 07:00  --------------------------------------------------------  IN: 2400 mL / OUT:  mL / NET: 380 mL    17 Oct 2021 07:  -  17 Oct 2021 11:58  --------------------------------------------------------  IN: 90 mL / OUT: 250 mL / NET: -160 mL        Tele:    General: No distress. Comfortable.  HEENT: EOM intact.  Neck: Neck supple. JVP not elevated. No masses  Chest: Clear to auscultation bilaterally  CV: Normal S1 and S2. No murmurs, rub, or gallops. Radial pulses normal.  Abdomen: Soft, non-distended, non-tender  Skin: No rashes or skin breakdown  Neurology: Alert and oriented times three. Sensation intact  Psych: Affect normal    Labs:                        10.8   8.34  )-----------( 190      ( 17 Oct 2021 00:32 )             33.6     10-17    134<L>  |  97  |  5<L>  ----------------------------<  98  4.3   |  24  |  0.46<L>    Ca    9.9      17 Oct 2021 00:30  Phos  2.3     10-17  Mg     1.6     10-17    TPro  7.7  /  Alb  4.0  /  TBili  0.9  /  DBili  x   /  AST  12  /  ALT  21  /  AlkPhos  149<H>  10-17              Lactate Dehydrogenase, Serum: 214 U/L (10-17 @ 00:30)  Lactate Dehydrogenase, Serum: 218 U/L (10-16 @ 00:44)  Lactate Dehydrogenase, Serum: 232 U/L (10-15 @ 01:13)       Subjective:  - refusing TF. no abdominal pain    Medications:  chlorhexidine 0.12% Liquid 15 milliLiter(s) Oral Mucosa two times a day  chlorhexidine 2% Cloths 1 Application(s) Topical <User Schedule>  dextrose 5% + sodium chloride 0.45% with potassium chloride 10 mEq/L 1000 milliLiter(s) IV Continuous <Continuous>  hydrocortisone sodium succinate Injectable 60 milliGRAM(s) IV Push every 12 hours  insulin lispro (ADMELOG) corrective regimen sliding scale   SubCutaneous every 6 hours  methimazole 20 milliGRAM(s) Oral <User Schedule>  metoclopramide Injectable 10 milliGRAM(s) IV Push every 8 hours  ondansetron Injectable 4 milliGRAM(s) IV Push every 8 hours PRN  pantoprazole  Injectable 40 milliGRAM(s) IV Push every 12 hours  trimethoprim / sulfamethoxazole IVPB 300 milliGRAM(s) IV Intermittent every 6 hours  vancomycin    Solution 125 milliGRAM(s) Oral every 12 hours  vancomycin  IVPB 1000 milliGRAM(s) IV Intermittent every 12 hours      Physical Exam:    Vitals:  Vital Signs Last 24 Hours  T(C): 36.5 (10-17-21 @ 08:00), Max: 36.5 (10-16-21 @ 20:00)  HR: 87 (10-17-21 @ 10:00) (71 - 104)  BP: --  RR: 18 (10-17-21 @ 10:00) (17 - 35)  SpO2: 100% (10-17-21 @ 10:00) (96% - 100%)    Weight in k.6 (10-17 @ 00:00)    I&O's Summary    16 Oct 2021 07:01  -  17 Oct 2021 07:00  --------------------------------------------------------  IN: 2400 mL / OUT: 2020 mL / NET: 380 mL    17 Oct 2021 07:  -  17 Oct 2021 11:58  --------------------------------------------------------  IN: 90 mL / OUT: 250 mL / NET: -160 mL    Tele:    General: Frail. No distress. Comfortable.  HEENT: EOM intact.  Neck: Neck supple. JVP not elevated. No masses  Chest: Clear to auscultation bilaterally  CV: LVAD hum.   Abdomen: Soft, non-distended, non-tender  Skin: No rashes or skin breakdown  Neurology: Alert. Sensation intact  Psych: Affect flat    Labs:                        10.8   8.34  )-----------( 190      ( 17 Oct 2021 00:32 )             33.6     10-17    134<L>  |  97  |  5<L>  ----------------------------<  98  4.3   |  24  |  0.46<L>    Ca    9.9      17 Oct 2021 00:30  Phos  2.3     10-17  Mg     1.6     10-17    TPro  7.7  /  Alb  4.0  /  TBili  0.9  /  DBili  x   /  AST  12  /  ALT  21  /  AlkPhos  149<H>  10-17    Lactate Dehydrogenase, Serum: 214 U/L (10-17 @ 00:30)  Lactate Dehydrogenase, Serum: 218 U/L (10-16 @ 00:44)  Lactate Dehydrogenase, Serum: 232 U/L (10-15 @ 01:13)

## 2021-10-17 NOTE — PROGRESS NOTE ADULT - SUBJECTIVE AND OBJECTIVE BOX
RICKY JOINT  MRN#: [id number]  Subjective:  Pulmonary progress  : recurrent Acute hypoxic respiratory Failure ,aspiration pneumonia, NICM  ,   65M PMH ACC/AHA stage D HF due to NICM HM2 LVAD , TV annuloplasty ring 17 as destination therapy due to severe peripheral artery disease with significant stenosis  SIADH, Depression, CKD-3 with hyperkalemia, past E. coli UTIs, driveline drainage (21) and COVID-19 (back in 2020)  He was recently seen in clinic where he complained of abdominal pain and dark stools w constipation back in May. He presents to [facility] ER today weakness and fatigue, moderate and + Black stools for three days, on coumadin secondary to warfarin use in the setting of an LVAD. Patient has required transfusions for GIB in the past. Mostly recently back in 2021 pt had anemia with dark stools. No interventions was done at that time. However Last Endoscopy was done in 2020 (negative). Today labs show patient is anemic with H/H of 4.5/16.3,. INR is 8.84 MAP in the 90s, Temp 35.1. He denies any chest pain, shortness of breath, dizziness, abd pain, nausea or vomiting. found to have  rectal bleeding underwent endoscopy ,old blood in the proximal ileum ,  develop sepsis with LL opacity given Antibiotics , Extubated , reintubated , Bronchoscopy on Zosyn for LL pneumonia  and Amiodarone S/P TV Annuloplasty , patient remain intubated on full ventilatory support .S/P multiple units of blood transfusion , remain on full ventilatory support on Precedex and propofol , new central IJ line , diarrhea C diff. +ve on po vancomycin and IV Flagyl,  mildly distended belly , fever start on cefepime 2gm q 8 hrs S/P tracheostomy .  new RT Subclavian central line continue on contact  isolation ,S/P  C-diff antibiotics, no more diarrhea back on full support mechanical ventilator , chest x ray show improvement in LLL air space disease, more awake and responsive on tube feeding no more diarrhea ,  no nausea or vomiting or diarrhea still very weak and tired , back on tube feeding ,still on po vancomycin , getting PT and OT at bed side ,   , no SOB getting stronger , improve muscle tone patient transfer to monitor bed still on contact isolation for C-Difficel colitis on 50% FI02, and change to Suresh  tube feeding still loose stool . H/H drop significantly require blood transfusion , most likely GI bleeding , IV heparin D/C ,  H/H is stable ., patient develop TR sided  pneumothorax require chest tube placement , RT IJ central line  placed , develop fever shaking chills , blood culture positive for serratia marcescens , start on cefepime .the patient  become hypoxic and hypotensive placed on full ventilatory support and Vasopressin , levophed and Dobutamine ,S/P blood transfusion on meropenem and vancomycin ,   , on and  off pressors , occasional agitation on Precedex .S/P IR cholecystostomy tube drainage placement in the RT upper Quadrant , resume anticoagulation chest x ray noted C-PAP trail lasted only for 2 hrs , new RT SC line and D/C RT IJ line , RT pig tail cathter has been removed , tolerating C-PAP trial placed on trach. collar 50% FI02 GI consultant noted on NG tube feeding as tolerated , develop AF RVR S/P  bolus Amiodarone  back to regular sinus Rhythm , Flat Affect depressed , back on tube feeding Vital AF at 60 cc/ hr .still intermittent abdominal pain , no fever saturation is accepted  back on full ventilatory support ,   on methimazole for hyperthyroidism ,  condition is the same ,still C/O Abdominal pain , white count is improving , no chest pain or SOB ,  .placed on Reglan 10 mg TID for gastric motility , depressed , withdrawn. , S/P  mesenteric angiogram , unable to stent SMA S/P 3 units of blood transfusion   RT IJ in place reassessed for stent in the SMA by vascular,  dark stool stable H/H ,surgical opinion for possible Lap cholecystectomy. over night events noted develop respiratory distress, , rectal bleeding, require intubation placed on full ventilatory support , FI02 is 50% also became hemianosmically unstable require triple pressor support with levophed , vasopressin and norsynephrine, pan culture placed  on Vancomycin IV and PO  and Zosyn, sedated with propofol kept NPO , new central line RT and left IJ cathter placed . Diflucan Added .fever of 38.5 weaned off  vasopressin , all culture are negative, resume tube feeding ,  white count is improving , on meropenem,  and bactrim   fever adding IV vancomycin and  vancomycin solution  , and Bactrim , S/P  tracheostomy , bleeding receiving blood transfusion on vasopressin , no more bleeding , no fever , more awake and responsive ,tolerating tube feeding , ID and heart failure follow up appreciated , depresses no weaning for now , magnesium is low to give supplement too keep Above 2 , potassium is low to give supplement , patient refuse vascular procedure for superior mesenteric artery occlusion C-PAP trial to mei cabrera      (2021 16:57)    PAST MEDICAL & SURGICAL HISTORY:  CHF (congestive heart failure)    CAD (coronary artery disease)  Depression    Pleural effusion    History of 2019 novel coronavirus disease (COVID-19)  2020    Hemorrhoids    Bleeding hemorrhoids    Peripheral arterial disease    Claudication    BPH with urinary obstruction    ACC/AHA stage D systolic heart failure  Anticoagulation goal of INR 2.0 to 2.5    Falls    Clavicle fracture    CKD (chronic kidney disease), stage III    Iron deficiency anemia    H/O epistaxis    Vertigo    GI bleed    S/P TVR (tricuspid valve repair)    S/P ventricular assist device    S/P endoscopy    OBJECTIVE:  ICU Vital Signs Last 24 Hrs  T(C): 38.2 (2021 10:00), Max: 38.5 (2021 12:00)  T(F): 100.8 (2021 10:00), Max: 101.3 (2021 12:00)  HR: 65 (2021 10:00) (61 - 69)  BP: --  BP(mean): --  ABP: 105/67 (2021 10:00) (90/54 - 113/64)  ABP(mean): 77 (2021 10:00) (63 - 77)  RR: 20 (2021 10:00) (19 - 35)  SpO2: 99% (2021 10:00) (96% - 100%)       @ 07:  -   @ 07:00  --------------------------------------------------------  IN: 2693.9 mL / OUT: 1415 mL / NET: 1278.9 mL     @ 07: @ 10:49  --------------------------------------------------------  IN: 420.8 mL / OUT: 115 mL / NET: 305.8 mL  PHYSICAL EXAM: Daily   Elderly male  on trach. collar  FI02 is 40% S/P tracheostomy   Daily Weight in k.4 (2021 00:00)  HEENT:     + NCAT  + EOMI  - Conjuctival edema   - Icterus   - Thrush   - ETT  + NGT/OGT  Neck:         + FROM  RT IJ , LT IJ  lines JVD  - Nodes - Masses + Mid-line trachea + Tracheostomy  Chest:            normal A-P diameter    Lungs:          + CTA   + Rhonchi    - Rales    - Wheezing + Decreased  LT BS   - Dullness R L  Cardiac:       + S1 + S2    + RRR   - Irregular   - S3  - S4    - Murmurs   - Rub   - Hamman’s sign   Abdomen:    + BS  + Soft + Non-tender - Distended - Organomegaly - PEG .cholecystostomy tube in place  Extremities:   - Cyanosis U/L   - Clubbing  U/L  + LE/UE Edema   + Capillary refill    + Pulses   Neuro:        - Awake   -  Alert   - Confused   - Lethargic   + Sedated  + Generalized Weakness  Skin:        - Rashes    - Erythema   + Normal incisions   + IV sites intact          HOSPITAL MEDICATIONS: All medications reviewed and analyzed  MEDICATIONS  (STANDING):  amiodarone    Tablet 200 milliGRAM(s) Oral daily  chlorhexidine 0.12% Liquid 15 milliLiter(s) Oral Mucosa every 12 hours  chlorhexidine 2% Cloths 1 Application(s) Topical <User Schedule>  dexmedetomidine Infusion 0.5 MICROgram(s)/kG/Hr (9.81 mL/Hr) IV Continuous <Continuous>  dextrose 50% Injectable 50 milliLiter(s) IV Push every 15 minutes  heparin  Infusion 400 Unit(s)/Hr (12.5 mL/Hr) IV Continuous <Continuous>  Hydromorphone  Injectable 0.5 milliGRAM(s) IV Push once  insulin lispro (ADMELOG) corrective regimen sliding scale   SubCutaneous every 6 hours  pantoprazole  Injectable 40 milliGRAM(s) IV Push every 12 hours  piperacillin/tazobactam IVPB.. 3.375 Gram(s) IV Intermittent every 8 hours  propofol Infusion 20 MICROgram(s)/kG/Min (9.42 mL/Hr) IV Continuous <Continuous>  sodium chloride 0.9% lock flush 3 milliLiter(s) IV Push every 8 hours  sodium chloride 0.9%. 1000 milliLiter(s) (10 mL/Hr) IV Continuous <Continuous>    MEDICATIONS  (PRN):  acetaminophen    Suspension .. 650 milliGRAM(s) Oral every 6 hours PRN Temp greater or equal to 38C (100.4F)    LABS: All Lab data reviewed and analyzed                        10.4   13 )-----------( 145      ( 15 Oct 2021 01:14 )             33.3    10-15    132<L>  |  97  |  4<L>  ----------------------------<  122<H>  4.6   |  24  |  0.36<L>    Ca    9.4      15 Oct 2021 13:21  Phos  2.1     10-15  Mg     1.7     10-15    TPro  7.3  /  Alb  4.0  /  TBili  1.5<H>  /  DBili  x   /  AST  24  /  ALT  33  /  AlkPhos  136<H>  10-15    Ca    9.3      14 Oct 2021 01:11  Phos  1.6     10-14  Mg     1.3     10-14    TPro  7.5  /  Alb  3.8  /  TBili  1.5<H>  /  DBili  x   /  AST  41<H>  /  ALT  39  /  AlkPhos  149<H>  10-    Ca    9.5      13 Oct 2021 01:38  Phos  1.9     10-13  Mg     1.6     10-13    TPro  7.3  /  Alb  3.7  /  TBili  1.2  /  DBili  x   /  AST  24  /  ALT  24  /  AlkPhos  139<H>  10-                                                                                                                                                                      PTT - ( 2021 04:52 )  PTT:45.2 sec LIVER FUNCTIONS - ( 2021 00:42 )  Alb: 3.4 g/dL / Pro: 6.7 g/dL / ALK PHOS: 213 U/L / ALT: 15 U/L / AST: 24 U/L / GGT: x           RADIOLOGY: - Reviewed and analyzed RT Pig tail cathter  , LVAD HM2, CT scan of abdomen reviewed result noted

## 2021-10-17 NOTE — PROGRESS NOTE ADULT - SUBJECTIVE AND OBJECTIVE BOX
RICKY HAMPTON  MRN-[id number]  Patient is a 66y old  Male who presents with a chief complaint of Anemia, Supratherapeutic INR, Dark Stools (16 Oct 2021 15:03)    HPI:  65M PMH ACC/AHA stage D HF due to NICM HM2 LVAD , TV annuloplasty ring 17 as destination therapy due to severe peripheral artery disease with significant stenosis  SIADH, Depression, CKD-3 with hyperkalemia, past E. coli UTIs, driveline drainage (21) and COVID-19 (back in 2020)  He was recently seen in clinic where he complained of abdominal pain and dark stools w constipation back in May. He presents to [facility] ER today weakness and fatigue, moderate and + Black stools for three days, on coumadin secondary to warfarin use in the setting of an LVAD. Patient has required transfusions for GIB in the past. Mostly recently back in 2021 pt had anemia with dark stools. No interventions was done at that time. However Last Endoscopy was done in 2020 (negative). Today labs show patient is anemic with H/H of 4.5/16.3,. INR is 8.84 MAP in the 90s, Temp 35.1. He denies any chest pain, shortness of breath, dizziness, abd pain, nausea or vomiting.       (2021 16:57)      Surgery/Hospital Course:   admit for melena w/ anemia, INR 8.84   6/15 Capsul study (+) for small bowel bleed, balloon endoscopy (old blood in prox ileum); post EGD - septic w/ L opacity, re-intubated for concern for aspiration, TTE (Mod MR, decrease biV w/ interventricular septum boweing towards R)   bronch    +C Diff    CT C/A/P: Fluid filled colon which may be 2/2 rapid transit. Small bilateral pleffs with associates. Compressive atelectasis New ISABELLE & LLL  parenchymal opacities, suspicious for pneumonia. Moderate stenosis in the proximal superior mesenteric artery.    #8 Shiley trach at bedside    LVAD speed increased to 9200   Bronch   TC since . Patient transferred to SDU.    INR today 2.64.  H&H 7.3/24 this AM.  Will repeat CBC at noon, and will send stool guaiac Patient with persistent abdominal tenderness, rate of tube feeds decreased.  No nausea/vomiting.     INR today 2.4. H&H 9.1/28.6 low flow overnight /N&V, refusing Tube feeds on D5 1/2 normal  @50 cc/hr   INR 2.69  H&H 7.7/.1 refusing Tube feeds on D5 1/2 normal  @50 cc/hr. This am + BM Melena Dr Oneill HF  aware- PRBC x1  GI team consulted -  NPO  plan on study in am-  D/w Dr Cadet Patient  to return to CTU for further management; 1PRBCS    Post op INR 2.2 today.  No bleeding. BC + for SM.  Pt is hypotensive requiring pressor and inotropic support.  ID follow up today on Cefepime will follow.   R PTC for PTX    CT C/A/P: sub q emphysema in R chest wall, GGO RUL, small ascites CTH negative; Abd US: GB thickening, pericholecystic fluid     Perchole drain in place continues to drain total output overnight 133.  Fever today 38.8    duplex LE negative    Patient with persistent abdominal pain, refusing tube feeds and medications, Psych consulted   CTA A/P ordered to r/o mesenteric ischemia 2/2 persistent anorexia, nausea, vomiting. Revealed:  Evaluation of the mesenteric vessels is limited by streak artifact from LVAD. There appears to be severe stenosis of the proximal SMA; abdominal mesenteric doppler is recommended for further evaluation. 2.  No small bowel findings to suggest acute mesenteric ischemia. 3.  Focal dissections involving the right and left common iliac arteries.  8/15: Cultures resulted BC 1/2 +GPC in clusters, SC enterobacter; mesenteric duplex: borderline stenosis of proximal SMA  : CT C/A/P noncon: Nondular opacities in R lung apex w/cavitation, abd nl  :  Continue current care, treatment of thyrotoxicosis with medications as per endocrine, d/c ABX as per team.    RUQ sono: Contracted gallbladder with cholecystostomy tube in place.  9/10 failed SMA stent, c/b RP bleed s/p 3U PRCB   CTA Abd/Pelvis L RP hematoma    Trach decannulated    intubated fro resp distress for increased WOB, LL pneumonia; CT C/A/P: progressive ISABELLE opacities and L consolidations, multifocal pneumonia    TTE (EF 25%, decreased RV, mod MR)   10/3 VT -> Lidocaine gtt   10/4 Trach size 6 DCT cuff  10/5 CT abd (neg for intra-abd abcess, severe stenosis of prox SMA and b/l iliac arteries)    Today:    REVIEW OF SYSTEMS:  No complaints at this time, intermittently complains of abdominal pain and nausea    ICU Vital Signs Last 24 Hrs  T(C): 36.4 (17 Oct 2021 04:00), Max: 36.5 (16 Oct 2021 20:00)  T(F): 97.6 (17 Oct 2021 04:00), Max: 97.7 (16 Oct 2021 20:00)  HR: 83 (17 Oct 2021 06:00) (71 - 104)  BP: --  BP(mean): --  ABP: 86/67 (17 Oct 2021 06:00) (75/63 - 106/69)  ABP(mean): 73 (17 Oct 2021 06:00) (67 - 96)  RR: 19 (17 Oct 2021 06:00) (19 - 35)  SpO2: 100% (17 Oct 2021 06:00) (96% - 100%)      Physical Exam:  Gen:  NAD, limited engagement to conversation  CNS: moves all extremities to command   Neck: no JVD, +trach, +central line   RES : coarse breath sounds, no wheezing    CVS: +LVAD hum   Abd: Soft. Sybil drain with bilious fluid. Positive BS throughout. Mild RUQ abdominal tenderness by perc sybil.  Skin: No rash, erythema, cyanosis.  Vasc: Warm and well-perfused.  Ext:  no edema    ============================I/O===========================   I&O's Detail    16 Oct 2021 07:01  -  17 Oct 2021 07:00  --------------------------------------------------------  IN:    dextrose 5% + sodium chloride 0.45% w/ Additives: 570 mL    Enteral Tube Flush: 150 mL    IV PiggyBack: 1650 mL  Total IN: 2370 mL    OUT:    Drain (mL): 120 mL    Miscellaneous Tube Feedin mL    Voided (mL): 1900 mL  Total OUT: 2020 mL    Total NET: 350 mL        ============================ LABS =========================                        10.8   8.34  )-----------( 190      ( 17 Oct 2021 00:32 )             33.6     10-    134<L>  |  97  |  5<L>  ----------------------------<  98  4.3   |  24  |  0.46<L>    Ca    9.9      17 Oct 2021 00:30  Phos  2.3     10-  Mg     1.6     10-17    TPro  7.7  /  Alb  4.0  /  TBili  0.9  /  DBili  x   /  AST  12  /  ALT  21  /  AlkPhos  149<H>  10-17    LIVER FUNCTIONS - ( 17 Oct 2021 00:30 )  Alb: 4.0 g/dL / Pro: 7.7 g/dL / ALK PHOS: 149 U/L / ALT: 21 U/L / AST: 12 U/L / GGT: x             ABG - ( 17 Oct 2021 00:20 )  pH, Arterial: 7.43  pH, Blood: x     /  pCO2: 45    /  pO2: 168   / HCO3: 30    / Base Excess: 4.9   /  SaO2: 99.6                ======================Micro/Rad/Cardio=================  Culture: Reviewed   CXR: Reviewed  Echo:Reviewed  ======================================================  PAST MEDICAL & SURGICAL HISTORY:  CHF (congestive heart failure)    CAD (coronary artery disease)    Depression    Pleural effusion    History of 2019 novel coronavirus disease (COVID-19)  2020    Hemorrhoids    Bleeding hemorrhoids    Peripheral arterial disease    Claudication    BPH with urinary obstruction    ACC/AHA stage D systolic heart failure    Anticoagulation goal of INR 2.0 to 2.5    Falls    Clavicle fracture    CKD (chronic kidney disease), stage III    Iron deficiency anemia    H/O epistaxis    Vertigo    GI bleed    S/P TVR (tricuspid valve repair)    S/P ventricular assist device    S/P endoscopy      ====================ASSESSMENT ==============  Stage D Nonischemic Cardiomyopathy, Status Post HM2 on 2017    Cardiogenic shock  Hemodynamic instability   Acute hypoxemic respiratory failure s/p trach , decannulated on ; Reintubated on    GI bleed , Status Post Enteroscopy   Anemia, in setting of melena   Chronic Kidney Disease  Stress hyperglycemia   C.diff positive on    Hypovolemic shock  Septic shock  Leukocytosis  GB thickening/percholecystic s/p perc sybil by IR   SMA stenosis  Serratia/citrobacter pneumonia   Stenotrophomonas pneumonia   Enterobacter pneumonia   Nasuea/vomiting  Deconditioning     Plan:  ====================== NEUROLOGY=====================  Nonfocal, continue to monitor neuro status per ICU protocol.     ==================== RESPIRATORY======================  Acute hypoxemic respiratory failure s/p #8 shiley trach on ; decannulated on ; Reintubated on ; trach size 6 DCT cuff on 10/4   Continue close monitoring of respiratory rate and breathing pattern, following of ABG’s with A-line monitoring, continuous pulse oximetry monitoring.   trach collar       FiO2: 35    ====================CARDIOVASCULAR==================  Stage D Nonischemic Cardiomyopathy, Status Post HM2 on 2017; LVAD settings 9200, flow 5.3  TTE 10/4:  Severely decreased LV systolic function, Diffuse hypokinesis. Mild AR. No pericardial effusion   VT on 10/4, s/p Lidocaine drip, continue close tele monitoring     ===================HEMATOLOGIC/ONC ===================  CTA A/P on  +L RP hematoma   Acute blood loss anemia, monitor H&H/Plts   Holding coumadin and ASA given recurrent GI bleeding, continue monitoring LDH     ===================== RENAL =========================  Hx of CKD, continue monitoring urine output, I&OS, BUN/Cr   Replete lytes PRN. Keep K> 4 and Mg >2    ==================== GASTROINTESTINAL===================  TFs held in setting of ongoing nausea / recent emesis   CT A/P 10/5 neg for intra-abd abcess, severe stenosis of prox SMA and b/l iliac arteries  CTA A/P : Evaluation of the mesenteric vessels is limited by streak artifact from LVAD. There appears to be severe stenosis of the proximal SMA; abdominal mesenteric doppler is recommended for further evaluation. 2.  No small bowel findings to suggest acute mesenteric ischemia. 3.  Focal dissections involving the right and left common iliac arteries.  Stenosis of proximal SMA, s/p failed SMA stent c/b RP bleed on 9/10 - Pt deferred decision re: reop to HCP (Sister, Cristina Rudolph), who reportedly states she will consent to surgery, vascular to f/u   Reglan for gut motility  Zofran for nausea     dextrose 5% + sodium chloride 0.45% with potassium chloride 10 mEq/L 1000 milliLiter(s) (30 mL/Hr) IV Continuous <Continuous>  pantoprazole  Injectable 40 milliGRAM(s) IV Push every 12 hours  metoclopramide Injectable 10 milliGRAM(s) IV Push every 8 hours  ondansetron Injectable 4 milliGRAM(s) IV Push every 8 hours PRN Nausea and/or Vomiting    =======================    ENDOCRINE  =====================  Stress hyperglycemia, continue glucose control with admelog sliding scale   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   CT C/A/P : progressive ISABELLE opacities and L consolidations, multifocal pneumonia  BCx  + Serratia marcescens, BCx 1/2 on 10/1 +Enterobacter cloacae complex  BCx 10/5 + 10/7 NGTD   Vancomycin solution for C. diff prophylaxis  Bactrim added for S. Maltophilia in sputum    trimethoprim / sulfamethoxazole IVPB 300 milliGRAM(s) IV Intermittent every 6 hours  vancomycin    Solution 125 milliGRAM(s) Oral every 12 hours  vancomycin  IVPB 1000 milliGRAM(s) IV Intermittent every 12 hours      Patient requires continuous monitoring with bedside rhythm monitoring, pulse ox monitoring, and intermittent blood gas analysis. Care plan discussed with ICU care team. Patient remained critical and at risk for life threatening decompensation.     By signing my name below, Aparna STANLEY, attest that this documentation has been prepared under the direction and in the presence of CLAUDIA Alfaro   Electronically signed: Cesia Villegas, 10-17-21 @ 08:02    Bertrand STANLEY, personally performed the services described in this documentation. all medical record entries made by the scribe were at my direction and in my presence. I have reviewed the chart and agree that the record reflects my personal performance and is accurate and complete  Electronically signed: CLAUDIA Alfaro        RICKY HAMPTON  MRN-[id number]  Patient is a 66y old  Male who presents with a chief complaint of Anemia, Supratherapeutic INR, Dark Stools (16 Oct 2021 15:03)    HPI:  65M PMH ACC/AHA stage D HF due to NICM HM2 LVAD , TV annuloplasty ring 17 as destination therapy due to severe peripheral artery disease with significant stenosis  SIADH, Depression, CKD-3 with hyperkalemia, past E. coli UTIs, driveline drainage (21) and COVID-19 (back in 2020)  He was recently seen in clinic where he complained of abdominal pain and dark stools w constipation back in May. He presents to [facility] ER today weakness and fatigue, moderate and + Black stools for three days, on coumadin secondary to warfarin use in the setting of an LVAD. Patient has required transfusions for GIB in the past. Mostly recently back in 2021 pt had anemia with dark stools. No interventions was done at that time. However Last Endoscopy was done in 2020 (negative). Today labs show patient is anemic with H/H of 4.5/16.3,. INR is 8.84 MAP in the 90s, Temp 35.1. He denies any chest pain, shortness of breath, dizziness, abd pain, nausea or vomiting.       (2021 16:57)      Surgery/Hospital Course:   admit for melena w/ anemia, INR 8.84   6/15 Capsul study (+) for small bowel bleed, balloon endoscopy (old blood in prox ileum); post EGD - septic w/ L opacity, re-intubated for concern for aspiration, TTE (Mod MR, decrease biV w/ interventricular septum boweing towards R)   bronch    +C Diff    CT C/A/P: Fluid filled colon which may be 2/2 rapid transit. Small bilateral pleffs with associates. Compressive atelectasis New ISABELLE & LLL  parenchymal opacities, suspicious for pneumonia. Moderate stenosis in the proximal superior mesenteric artery.    #8 Shiley trach at bedside    LVAD speed increased to 9200   Bronch   TC since . Patient transferred to SDU.    INR today 2.64.  H&H 7.3/24 this AM.  Will repeat CBC at noon, and will send stool guaiac Patient with persistent abdominal tenderness, rate of tube feeds decreased.  No nausea/vomiting.     INR today 2.4. H&H 9.1/28.6 low flow overnight /N&V, refusing Tube feeds on D5 1/2 normal  @50 cc/hr   INR 2.69  H&H 7.7/.1 refusing Tube feeds on D5 1/2 normal  @50 cc/hr. This am + BM Melena Dr Oneill HF  aware- PRBC x1  GI team consulted -  NPO  plan on study in am-  D/w Dr Cadet Patient  to return to CTU for further management; 1PRBCS    Post op INR 2.2 today.  No bleeding. BC + for SM.  Pt is hypotensive requiring pressor and inotropic support.  ID follow up today on Cefepime will follow.   R PTC for PTX    CT C/A/P: sub q emphysema in R chest wall, GGO RUL, small ascites CTH negative; Abd US: GB thickening, pericholecystic fluid     Perchole drain in place continues to drain total output overnight 133.  Fever today 38.8    duplex LE negative    Patient with persistent abdominal pain, refusing tube feeds and medications, Psych consulted   CTA A/P ordered to r/o mesenteric ischemia 2/2 persistent anorexia, nausea, vomiting. Revealed:  Evaluation of the mesenteric vessels is limited by streak artifact from LVAD. There appears to be severe stenosis of the proximal SMA; abdominal mesenteric doppler is recommended for further evaluation. 2.  No small bowel findings to suggest acute mesenteric ischemia. 3.  Focal dissections involving the right and left common iliac arteries.  8/15: Cultures resulted BC 1/2 +GPC in clusters, SC enterobacter; mesenteric duplex: borderline stenosis of proximal SMA  : CT C/A/P noncon: Nondular opacities in R lung apex w/cavitation, abd nl  :  Continue current care, treatment of thyrotoxicosis with medications as per endocrine, d/c ABX as per team.    RUQ sono: Contracted gallbladder with cholecystostomy tube in place.  9/10 failed SMA stent, c/b RP bleed s/p 3U PRCB   CTA Abd/Pelvis L RP hematoma    Trach decannulated    intubated fro resp distress for increased WOB, LL pneumonia; CT C/A/P: progressive ISABELLE opacities and L consolidations, multifocal pneumonia    TTE (EF 25%, decreased RV, mod MR)   10/3 VT -> Lidocaine gtt   10/4 Trach size 6 DCT cuff  10/5 CT abd (neg for intra-abd abcess, severe stenosis of prox SMA and b/l iliac arteries)    Today:  Plan for SMA stent tomorrow, stable from cardiac surgery standpoint for planned procedure  - Bcx from 10/14 + w/ GNR, Repeat cultures sent, on appropriate ABx coverage      REVIEW OF SYSTEMS:  No complaints at this time, intermittently complains of abdominal pain and nausea    ICU Vital Signs Last 24 Hrs  T(C): 36.4 (17 Oct 2021 04:00), Max: 36.5 (16 Oct 2021 20:00)  T(F): 97.6 (17 Oct 2021 04:00), Max: 97.7 (16 Oct 2021 20:00)  HR: 83 (17 Oct 2021 06:00) (71 - 104)  BP: --  BP(mean): --  ABP: 86/67 (17 Oct 2021 06:00) (75/63 - 106/69)  ABP(mean): 73 (17 Oct 2021 06:00) (67 - 96)  RR: 19 (17 Oct 2021 06:00) (19 - 35)  SpO2: 100% (17 Oct 2021 06:00) (96% - 100%)      Physical Exam:  Gen:  NAD  CNS: moves all extremities to command   Neck: no JVD, +trach, +central line   RES : coarse breath sounds, no wheezing    CVS: +LVAD hum   Abd: Soft. Sybil drain with bilious fluid. Positive BS throughout. Mild RUQ abdominal tenderness by perc sybil.  Skin: No rash, erythema, cyanosis.  Vasc: Warm and well-perfused.  Ext:  no edema    ============================I/O===========================   I&O's Detail    16 Oct 2021 07:01  -  17 Oct 2021 07:00  --------------------------------------------------------  IN:    dextrose 5% + sodium chloride 0.45% w/ Additives: 570 mL    Enteral Tube Flush: 150 mL    IV PiggyBack: 1650 mL  Total IN: 2370 mL    OUT:    Drain (mL): 120 mL    Miscellaneous Tube Feedin mL    Voided (mL): 1900 mL  Total OUT: 2020 mL    Total NET: 350 mL        ============================ LABS =========================                        10.8   8.34  )-----------( 190      ( 17 Oct 2021 00:32 )             33.6     10-17    134<L>  |  97  |  5<L>  ----------------------------<  98  4.3   |  24  |  0.46<L>    Ca    9.9      17 Oct 2021 00:30  Phos  2.3     10-17  Mg     1.6     10-17    TPro  7.7  /  Alb  4.0  /  TBili  0.9  /  DBili  x   /  AST  12  /  ALT  21  /  AlkPhos  149<H>  10-17    LIVER FUNCTIONS - ( 17 Oct 2021 00:30 )  Alb: 4.0 g/dL / Pro: 7.7 g/dL / ALK PHOS: 149 U/L / ALT: 21 U/L / AST: 12 U/L / GGT: x             ABG - ( 17 Oct 2021 00:20 )  pH, Arterial: 7.43  pH, Blood: x     /  pCO2: 45    /  pO2: 168   / HCO3: 30    / Base Excess: 4.9   /  SaO2: 99.6                ======================Micro/Rad/Cardio=================  Culture: Reviewed   CXR: Reviewed  Echo:Reviewed  ======================================================  PAST MEDICAL & SURGICAL HISTORY:  CHF (congestive heart failure)    CAD (coronary artery disease)    Depression    Pleural effusion    History of  novel coronavirus disease (COVID-19)  2020    Hemorrhoids    Bleeding hemorrhoids    Peripheral arterial disease    Claudication    BPH with urinary obstruction    ACC/AHA stage D systolic heart failure    Anticoagulation goal of INR 2.0 to 2.5    Falls    Clavicle fracture    CKD (chronic kidney disease), stage III    Iron deficiency anemia    H/O epistaxis    Vertigo    GI bleed    S/P TVR (tricuspid valve repair)    S/P ventricular assist device    S/P endoscopy      ====================ASSESSMENT ==============  Stage D Nonischemic Cardiomyopathy, Status Post HM2 on 2017    Cardiogenic shock  Hemodynamic instability   Acute hypoxemic respiratory failure s/p trach , decannulated on ; Reintubated on    GI bleed , Status Post Enteroscopy   Anemia, in setting of melena   Chronic Kidney Disease  Stress hyperglycemia   C.diff positive on    Hypovolemic shock  Septic shock  Leukocytosis  GB thickening/percholecystic s/p perc sybil by IR   SMA stenosis  Serratia/citrobacter pneumonia   Stenotrophomonas pneumonia   Enterobacter pneumonia   Nasuea/vomiting  Deconditioning     Plan:  ====================== NEUROLOGY=====================  Nonfocal, continue to monitor neuro status per ICU protocol.     ==================== RESPIRATORY======================  Acute hypoxemic respiratory failure s/p #8 shiley trach on ; decannulated on ; Reintubated on ; trach size 6 DCT cuff on 10/4   Continue close monitoring of respiratory rate and breathing pattern, following of ABG’s with A-line monitoring, continuous pulse oximetry monitoring.   trach collar       FiO2: 35    ====================CARDIOVASCULAR==================  Stage D Nonischemic Cardiomyopathy, Status Post HM2 on 2017; LVAD settings 9200, flow 5.3  TTE 10/4:  Severely decreased LV systolic function, Diffuse hypokinesis. Mild AR. No pericardial effusion   VT on 10/4, s/p Lidocaine drip, continue close tele monitoring     ===================HEMATOLOGIC/ONC ===================  CTA A/P on  +L RP hematoma   Acute blood loss anemia, monitor H&H/Plts   Holding coumadin and ASA given recurrent GI bleeding, continue monitoring LDH     ===================== RENAL =========================  Hx of CKD, continue monitoring urine output, I&OS, BUN/Cr   Replete lytes PRN. Keep K> 4 and Mg >2    ==================== GASTROINTESTINAL===================  TFs held in setting of ongoing nausea / recent emesis   CT A/P 10/5 neg for intra-abd abcess, severe stenosis of prox SMA and b/l iliac arteries  CTA A/P : Evaluation of the mesenteric vessels is limited by streak artifact from LVAD. There appears to be severe stenosis of the proximal SMA; abdominal mesenteric doppler is recommended for further evaluation. 2.  No small bowel findings to suggest acute mesenteric ischemia. 3.  Focal dissections involving the right and left common iliac arteries.  Stenosis of proximal SMA, s/p failed SMA stent c/b RP bleed on 9/10 - Pt deferred decision re: reop to HCP (Sister, Cristina Rudolph), who reportedly states she will consent to surgery, vascular to f/u   Reglan for gut motility  Zofran for nausea   Plan for SMA stent tomorrow with Vascular surgery, patient is stable for planned procedure.    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   CT C/A/P : progressive ISABELLE opacities and L consolidations, multifocal pneumonia  BCx  + Serratia marcescens, BCx 1/2 on 10/1 +Enterobacter cloacae complex  BCx 10/5 + 10/7 NGTD   Vancomycin solution for C. diff prophylaxis  Bactrim added for S. Maltophilia in sputum  Repeat culture sent for + Bcx w/ GNR from 10/14, prior cultured organisms sensitive to current abx coverage.    trimethoprim / sulfamethoxazole IVPB 300 milliGRAM(s) IV Intermittent every 6 hours  vancomycin    Solution 125 milliGRAM(s) Oral every 12 hours  vancomycin  IVPB 1000 milliGRAM(s) IV Intermittent every 12 hours      Patient requires continuous monitoring with bedside rhythm monitoring, pulse ox monitoring, and intermittent blood gas analysis. Care plan discussed with ICU care team. Patient remained critical and at risk for life threatening decompensation.     By signing my name below, I, Aparna Saleh, attest that this documentation has been prepared under the direction and in the presence of CLAUDIA Alfaro   Electronically signed: Cesia Villegas, 10-17-21 @ 08:02    I, Bertrand Turk, personally performed the services described in this documentation. all medical record entries made by the scribe were at my direction and in my presence. I have reviewed the chart and agree that the record reflects my personal performance and is accurate and complete  Electronically signed: CLAUDIA Alfaro

## 2021-10-17 NOTE — PROGRESS NOTE ADULT - ASSESSMENT
65 YO M with a history of stage D NICM s/p HM2 on 9/2017 as DT (due to severe PAD) with TV ring, prior COVID-19 infection 4/2020, recurrent syncope post LVAD s/p ILR, and chronic abdominal pain with prior negative workup who was admitted 6/14/21 with symptomatic anemia with Hgb 4.5 in setting of INR 8.8 without hemodynamic instability and has since had a protracted hospitalization. He was transfused and underwent VCE which showed active bleeding in the mid small bowel but subsequent enteroscopy 6/15 did not reveal any active bleeding. He acutely decompensated after procedure with fever/hypertension, low flow alarms, and pulmonary infiltrate with hypoxia requiring intubation from probable aspiration PNA. He was unable to extubated and has since undergone tracheostomy but tolerating persistent trach collar and nearing ability for decannulation. 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 He underwent angiogram on 9/10, stent was unable to be placed and course was then complicated by RP bleed. He continued to have persistent leukocytosis and febrile episodes and was noted to have positive sputum culture for serratia marcescens and completed his course of abx. He was initially decannulated on 9/23. Recently he started having multiples of melena, emesis and went into acte respiratory distress and was ultimately re-intubated on 9/26.     He had blood cultures are positive for enterobacter cloacae/serratia, remains on IV antibiotics. He is s/p trach with ENT on 10/4. Sputum cultures positive for stenotrophomonas, started on bactrim. BCx negative. Plan for SMA stent on Monday.

## 2021-10-18 NOTE — PROGRESS NOTE ADULT - SUBJECTIVE AND OBJECTIVE BOX
Vascular Surgery    SUBJECTIVE: Pt seen and examined on rounds with team. No acute events overnight.        OBJECTIVE    PHYSICAL EXAM:  Gen: NAD, well-developed, responsive with head shakes  HEENT: trach collar  Abd: soft, non-distended, mild tenderness    VITALS  T(C): 36.2 (10-18-21 @ 08:00), Max: 36.5 (10-17-21 @ 11:00)  HR: 96 (10-18-21 @ 10:00) (82 - 111)  BP: --  RR: 18 (10-18-21 @ 10:00) (12 - 37)  SpO2: 100% (10-18-21 @ 10:00) (90% - 100%)  CAPILLARY BLOOD GLUCOSE      POCT Blood Glucose.: 90 mg/dL (18 Oct 2021 05:32)  POCT Blood Glucose.: 96 mg/dL (18 Oct 2021 00:54)  POCT Blood Glucose.: 91 mg/dL (17 Oct 2021 17:05)  POCT Blood Glucose.: 84 mg/dL (17 Oct 2021 11:07)      Is/Os    10-17 @ 07:01  -  10-18 @ 07:00  --------------------------------------------------------  IN:    dextrose 5% + sodium chloride 0.45% w/ Additives: 720 mL    Enteral Tube Flush: 100 mL    IV PiggyBack: 1700 mL  Total IN: 2520 mL    OUT:    Drain (mL): 250 mL    Voided (mL): 1865 mL  Total OUT: 2115 mL    Total NET: 405 mL      10-18 @ 07:01  -  10-18 @ 10:44  --------------------------------------------------------  IN:    dextrose 5% + sodium chloride 0.45% w/ Additives: 60 mL  Total IN: 60 mL    OUT:    Voided (mL): 450 mL  Total OUT: 450 mL    Total NET: -390 mL          MEDICATIONS (STANDING): dextrose 5% + sodium chloride 0.45% with potassium chloride 10 mEq/L 1000 milliLiter(s) IV Continuous <Continuous>  hydrocortisone sodium succinate Injectable 60 milliGRAM(s) IV Push every 12 hours  insulin lispro (ADMELOG) corrective regimen sliding scale   SubCutaneous every 6 hours  methimazole 20 milliGRAM(s) Oral <User Schedule>  metoclopramide Injectable 10 milliGRAM(s) IV Push every 8 hours  pantoprazole  Injectable 40 milliGRAM(s) IV Push every 12 hours  trimethoprim / sulfamethoxazole IVPB 300 milliGRAM(s) IV Intermittent every 6 hours  vancomycin    Solution 125 milliGRAM(s) Oral every 12 hours  vancomycin  IVPB 1000 milliGRAM(s) IV Intermittent every 12 hours    MEDICATIONS (PRN):ondansetron Injectable 4 milliGRAM(s) IV Push every 8 hours PRN Nausea and/or Vomiting      LABS  CBC (10-18 @ 01:03)                              10.6<L>                         6.15    )----------------(  188        --    % Neutrophils, --    % Lymphocytes, ANC: --                                  33.3<L>  CBC (10-17 @ 00:32)                              10.8<L>                         8.34    )----------------(  190        --    % Neutrophils, --    % Lymphocytes, ANC: --                                  33.6<L>    BMP (10-18 @ 01:03)             133<L>  |  95<L>   |  5<L>  		Ca++ --      Ca 9.7                ---------------------------------( 94    		Mg 1.5<L>             3.9     |  23      |  0.52  			Ph 3.0     BMP (10-17 @ 00:30)             134<L>  |  97      |  5<L>  		Ca++ --      Ca 9.9                ---------------------------------( 98    		Mg 1.6                4.3     |  24      |  0.46<L>			Ph 2.3<L>    LFTs (10-18 @ 01:03)      TPro 7.6 / Alb 3.9 / TBili 0.8 / DBili -- / AST 15 / ALT 20 / AlkPhos 153<H>  LFTs (10-17 @ 00:30)      TPro 7.7 / Alb 4.0 / TBili 0.9 / DBili -- / AST 12 / ALT 21 / AlkPhos 149<H>    Coags (10-18 @ 01:03)  aPTT 29.2 / INR 1.50<H> / PT 17.7<H>      ABG (10-18 @ 01:01)     7.42 / 43 / 119<H> / 28 / 3.0 / 99.6<H>%     Lactate:    ABG (10-17 @ 00:20)     7.43 / 45 / 168<H> / 30<H> / 4.9<H> / 99.6<H>%     Lactate:          IMAGING STUDIES     Vascular Surgery    SUBJECTIVE: Pt seen and examined on rounds with team. No acute events overnight.        OBJECTIVE    PHYSICAL EXAM:  Gen: NAD, well-developed, responsive   HEENT: trach collar  Abd: soft, tender to deep palpation    VITALS  T(C): 36.2 (10-18-21 @ 08:00), Max: 36.5 (10-17-21 @ 11:00)  HR: 96 (10-18-21 @ 10:00) (82 - 111)  BP: --  RR: 18 (10-18-21 @ 10:00) (12 - 37)  SpO2: 100% (10-18-21 @ 10:00) (90% - 100%)  CAPILLARY BLOOD GLUCOSE      POCT Blood Glucose.: 90 mg/dL (18 Oct 2021 05:32)  POCT Blood Glucose.: 96 mg/dL (18 Oct 2021 00:54)  POCT Blood Glucose.: 91 mg/dL (17 Oct 2021 17:05)  POCT Blood Glucose.: 84 mg/dL (17 Oct 2021 11:07)      Is/Os    10-17 @ 07:01  -  10-18 @ 07:00  --------------------------------------------------------  IN:    dextrose 5% + sodium chloride 0.45% w/ Additives: 720 mL    Enteral Tube Flush: 100 mL    IV PiggyBack: 1700 mL  Total IN: 2520 mL    OUT:    Drain (mL): 250 mL    Voided (mL): 1865 mL  Total OUT: 2115 mL    Total NET: 405 mL      10-18 @ 07:01  -  10-18 @ 10:44  --------------------------------------------------------  IN:    dextrose 5% + sodium chloride 0.45% w/ Additives: 60 mL  Total IN: 60 mL    OUT:    Voided (mL): 450 mL  Total OUT: 450 mL    Total NET: -390 mL          MEDICATIONS (STANDING): dextrose 5% + sodium chloride 0.45% with potassium chloride 10 mEq/L 1000 milliLiter(s) IV Continuous <Continuous>  hydrocortisone sodium succinate Injectable 60 milliGRAM(s) IV Push every 12 hours  insulin lispro (ADMELOG) corrective regimen sliding scale   SubCutaneous every 6 hours  methimazole 20 milliGRAM(s) Oral <User Schedule>  metoclopramide Injectable 10 milliGRAM(s) IV Push every 8 hours  pantoprazole  Injectable 40 milliGRAM(s) IV Push every 12 hours  trimethoprim / sulfamethoxazole IVPB 300 milliGRAM(s) IV Intermittent every 6 hours  vancomycin    Solution 125 milliGRAM(s) Oral every 12 hours  vancomycin  IVPB 1000 milliGRAM(s) IV Intermittent every 12 hours    MEDICATIONS (PRN):ondansetron Injectable 4 milliGRAM(s) IV Push every 8 hours PRN Nausea and/or Vomiting      LABS  CBC (10-18 @ 01:03)                              10.6<L>                         6.15    )----------------(  188        --    % Neutrophils, --    % Lymphocytes, ANC: --                                  33.3<L>  CBC (10-17 @ 00:32)                              10.8<L>                         8.34    )----------------(  190        --    % Neutrophils, --    % Lymphocytes, ANC: --                                  33.6<L>    BMP (10-18 @ 01:03)             133<L>  |  95<L>   |  5<L>  		Ca++ --      Ca 9.7                ---------------------------------( 94    		Mg 1.5<L>             3.9     |  23      |  0.52  			Ph 3.0     BMP (10-17 @ 00:30)             134<L>  |  97      |  5<L>  		Ca++ --      Ca 9.9                ---------------------------------( 98    		Mg 1.6                4.3     |  24      |  0.46<L>			Ph 2.3<L>    LFTs (10-18 @ 01:03)      TPro 7.6 / Alb 3.9 / TBili 0.8 / DBili -- / AST 15 / ALT 20 / AlkPhos 153<H>  LFTs (10-17 @ 00:30)      TPro 7.7 / Alb 4.0 / TBili 0.9 / DBili -- / AST 12 / ALT 21 / AlkPhos 149<H>    Coags (10-18 @ 01:03)  aPTT 29.2 / INR 1.50<H> / PT 17.7<H>      ABG (10-18 @ 01:01)     7.42 / 43 / 119<H> / 28 / 3.0 / 99.6<H>%     Lactate:    ABG (10-17 @ 00:20)     7.43 / 45 / 168<H> / 30<H> / 4.9<H> / 99.6<H>%     Lactate:          IMAGING STUDIES

## 2021-10-18 NOTE — BRIEF OPERATIVE NOTE - OPERATION/FINDINGS
L brachial cutdown, mesenteric angiogram, SMA stent x2 placed
trachea midline
Patially open stoma
SMA stenosis at origin, unable to successfully advance stent into SMA

## 2021-10-18 NOTE — PROGRESS NOTE ADULT - SUBJECTIVE AND OBJECTIVE BOX
RICKY HAMPTON  MRN-[id number]  Patient is a 66y old  Male who presents with a chief complaint of Anemia, Supratherapeutic INR, Dark Stools (18 Oct 2021 07:46)    HPI:  65M PMH ACC/AHA stage D HF due to NICM HM2 LVAD , TV annuloplasty ring 9/12/17 as destination therapy due to severe peripheral artery disease with significant stenosis  SIADH, Depression, CKD-3 with hyperkalemia, past E. coli UTIs, driveline drainage (1/7/21) and COVID-19 (back in April 2020)  He was recently seen in clinic where he complained of abdominal pain and dark stools w constipation back in May. He presents to [facility] ER today weakness and fatigue, moderate and + Black stools for three days, on coumadin secondary to warfarin use in the setting of an LVAD. Patient has required transfusions for GIB in the past. Mostly recently back in jan 2021 pt had anemia with dark stools. No interventions was done at that time. However Last Endoscopy was done in July 2020 (negative). Today labs show patient is anemic with H/H of 4.5/16.3,. INR is 8.84 MAP in the 90s, Temp 35.1. He denies any chest pain, shortness of breath, dizziness, abd pain, nausea or vomiting.       (14 Jun 2021 16:57)      Surgery/Hospital Course:  6/14 admit for melena w/ anemia, INR 8.84   6/15 Capsul study (+) for small bowel bleed, balloon endoscopy (old blood in prox ileum); post EGD - septic w/ L opacity, re-intubated for concern for aspiration, TTE (Mod MR, decrease biV w/ interventricular septum boweing towards R)  6/17 bronch   6/20 +C Diff   6/22 CT C/A/P: Fluid filled colon which may be 2/2 rapid transit. Small bilateral pleffs with associates. Compressive atelectasis New ISABELLE & LLL  parenchymal opacities, suspicious for pneumonia. Moderate stenosis in the proximal superior mesenteric artery.   6/25 #8 Shiley trach at bedside   7/1 LVAD speed increased to 9200  7/2 Bronch  7/20 TC since 7/7. Patient transferred to SDU.   7/23 INR today 2.64.  H&H 7.3/24 this AM.  Will repeat CBC at noon, and will send stool guaiac Patient with persistent abdominal tenderness, rate of tube feeds decreased.  No nausea/vomiting.    7/24 INR today 2.4. H&H 9.1/28.6 low flow overnight /N&V, refusing Tube feeds on D5 1/2 normal  @50 cc/hr  7/25 INR 2.69  H&H 7.7/25.1 refusing Tube feeds on D5 1/2 normal  @50 cc/hr. This am + BM Melena Dr Oneill HF  aware- PRBC x1  GI team consulted -  NPO  plan on study in am-  D/w Dr Cadet Patient  to return to CTU for further management; 1PRBCS   7/27 Post op INR 2.2 today.  No bleeding. BC + for SM.  Pt is hypotensive requiring pressor and inotropic support.  ID follow up today on Cefepime will follow.  7/26 R PTC for PTX   7/28 CT C/A/P: sub q emphysema in R chest wall, GGO RUL, small ascites CTH negative; Abd US: GB thickening, pericholecystic fluid    7/31 Perchole drain in place continues to drain total output overnight 133.  Fever today 38.8   8/1 duplex LE negative   8/7 Patient with persistent abdominal pain, refusing tube feeds and medications, Psych consulted  8/13 CTA A/P ordered to r/o mesenteric ischemia 2/2 persistent anorexia, nausea, vomiting. Revealed:  Evaluation of the mesenteric vessels is limited by streak artifact from LVAD. There appears to be severe stenosis of the proximal SMA; abdominal mesenteric doppler is recommended for further evaluation. 2.  No small bowel findings to suggest acute mesenteric ischemia. 3.  Focal dissections involving the right and left common iliac arteries.  8/15: Cultures resulted BC 1/2 +GPC in clusters, SC enterobacter; mesenteric duplex: borderline stenosis of proximal SMA  8/27: CT C/A/P noncon: Nondular opacities in R lung apex w/cavitation, abd nl  8/31:  Continue current care, treatment of thyrotoxicosis with medications as per endocrine, d/c ABX as per team.   9/8 RUQ sono: Contracted gallbladder with cholecystostomy tube in place.  9/10 failed SMA stent, c/b RP bleed s/p 3U PRCB  9/12 CTA Abd/Pelvis L RP hematoma   9/24 Trach decannulated   9/26 intubated fro resp distress for increased WOB, LL pneumonia; CT C/A/P: progressive ISABELLE opacities and L consolidations, multifocal pneumonia   9/30 TTE (EF 25%, decreased RV, mod MR)   10/3 VT -> Lidocaine gtt   10/4 Trach size 6 DCT cuff  10/5 CT abd (neg for intra-abd abcess, severe stenosis of prox SMA and b/l iliac arteries)    Today:  Plan for SMA stent, cleared from cardiac surgery standpoint.     REVIEW OF SYSTEMS:  Gen: No fever  EYES/ENT: No visual changes;  No vertigo or throat pain   NECK: No pain   RES:  No shortness of breath or Cough  CARD: No chest pain   GI: +intermittently c/o abd pain and nausea   : No dysuria  NEURO: No weakness  SKIN: No itching, rashes     ICU Vital Signs Last 24 Hrs  T(C): 36.2 (18 Oct 2021 08:00), Max: 36.5 (17 Oct 2021 11:00)  T(F): 97.2 (18 Oct 2021 08:00), Max: 97.7 (17 Oct 2021 11:00)  HR: 100 (18 Oct 2021 08:00) (82 - 111)  BP: --  BP(mean): --  ABP: 101/73 (18 Oct 2021 08:00) (75/65 - 179/179)  ABP(mean): 85 (18 Oct 2021 08:00) (70 - 179)  RR: 32 (18 Oct 2021 08:00) (12 - 37)  SpO2: 95% (18 Oct 2021 08:00) (90% - 100%)      Physical Exam:  Gen:  NAD  CNS: moves all extremities to command   Neck: no JVD, +trach, +central line   RES : coarse breath sounds, no wheezing    CVS: +LVAD hum   Abd: Soft. Sybil drain with bilious fluid. Positive BS throughout. Mild RUQ abdominal tenderness by perc sybil.  Skin: No rash, erythema, cyanosis.  Vasc: Warm and well-perfused.  Ext:  no edema      ============================I/O===========================   I&O's Detail    17 Oct 2021 07:01  -  18 Oct 2021 07:00  --------------------------------------------------------  IN:    dextrose 5% + sodium chloride 0.45% w/ Additives: 720 mL    Enteral Tube Flush: 100 mL    IV PiggyBack: 1700 mL  Total IN: 2520 mL    OUT:    Drain (mL): 250 mL    Voided (mL): 1865 mL  Total OUT: 2115 mL    Total NET: 405 mL        ============================ LABS =========================                        10.6   6.15  )-----------( 188      ( 18 Oct 2021 01:03 )             33.3     10-18    133<L>  |  95<L>  |  5<L>  ----------------------------<  94  3.9   |  23  |  0.52    Ca    9.7      18 Oct 2021 01:03  Phos  3.0     10-18  Mg     1.5     10-18    TPro  7.6  /  Alb  3.9  /  TBili  0.8  /  DBili  x   /  AST  15  /  ALT  20  /  AlkPhos  153<H>  10-18    LIVER FUNCTIONS - ( 18 Oct 2021 01:03 )  Alb: 3.9 g/dL / Pro: 7.6 g/dL / ALK PHOS: 153 U/L / ALT: 20 U/L / AST: 15 U/L / GGT: x           PT/INR - ( 18 Oct 2021 01:03 )   PT: 17.7 sec;   INR: 1.50 ratio         PTT - ( 18 Oct 2021 01:03 )  PTT:29.2 sec  ABG - ( 18 Oct 2021 01:01 )  pH, Arterial: 7.42  pH, Blood: x     /  pCO2: 43    /  pO2: 119   / HCO3: 28    / Base Excess: 3.0   /  SaO2: 99.6                ======================Micro/Rad/Cardio=================  Culture: Reviewed   CXR: Reviewed  Echo:Reviewed  ======================================================  PAST MEDICAL & SURGICAL HISTORY:  CHF (congestive heart failure)    CAD (coronary artery disease)    Depression    Pleural effusion    History of 2019 novel coronavirus disease (COVID-19)  april 2020    Hemorrhoids    Bleeding hemorrhoids    Peripheral arterial disease    Claudication    BPH with urinary obstruction    ACC/AHA stage D systolic heart failure    Anticoagulation goal of INR 2.0 to 2.5    Falls    Clavicle fracture    CKD (chronic kidney disease), stage III    Iron deficiency anemia    H/O epistaxis    Vertigo    GI bleed    S/P TVR (tricuspid valve repair)    S/P ventricular assist device    S/P endoscopy      ====================ASSESSMENT ==============  Stage D Nonischemic Cardiomyopathy, Status Post HM2 on 9/2017    Cardiogenic shock  Hemodynamic instability   Acute hypoxemic respiratory failure s/p trach 6/25, decannulated on 9/24; Reintubated on 9/26   GI bleed , Status Post Enteroscopy 6/14  Anemia, in setting of melena   Chronic Kidney Disease  Stress hyperglycemia   C.diff positive on 6/20   Hypovolemic shock  Septic shock  Leukocytosis  GB thickening/percholecystic s/p perc sybil by IR 7/30  SMA stenosis  Serratia/citrobacter pneumonia 7/7  Stenotrophomonas pneumonia 8/1  Enterobacter pneumonia 8/13  Nasuea/vomiting  Deconditioning     Plan:  ====================== NEUROLOGY=====================  Continue close monitoring of neuro status     ==================== RESPIRATORY======================  Acute hypoxemic respiratory failure s/p #8 shiley trach on 6/25; decannulated on 9/24; Reintubated on 9/26; trach size 6 DCT cuff on 10/4   Continue close monitoring of respiratory rate and breathing pattern, following of ABG’s with A-line monitoring, continuous pulse oximetry monitoring.   C/w TC as tolerated     Mechanical Ventilation:  Mode: off    ====================CARDIOVASCULAR==================  Stage D Nonischemic Cardiomyopathy, Status Post HM2 on 9/2017; LVAD settings 9200, flow 5.4  TTE 10/4:  Severely decreased LV systolic function, Diffuse hypokinesis. Mild AR. No pericardial effusion   VT on 10/4, s/p Lidocaine drip, continue close tele monitoring     ===================HEMATOLOGIC/ONC ===================  CTA A/P on 9/12 +L RP hematoma   Acute blood loss anemia, monitor H&H/Plts   Holding coumadin and ASA given recurrent GI bleeding, continue monitoring LDH     ===================== RENAL =========================  Hx of CKD, continue monitoring urine output, I&OS, BUN/Cr   Replete lytes PRN. Keep K> 4 and Mg >2    ==================== GASTROINTESTINAL===================  TFs held in setting of ongoing nausea / recent emesis   CT A/P 10/5 neg for intra-abd abcess, severe stenosis of prox SMA and b/l iliac arteries  CTA A/P 8/13: Evaluation of the mesenteric vessels is limited by streak artifact from LVAD. There appears to be severe stenosis of the proximal SMA; abdominal mesenteric doppler is recommended for further evaluation. 2.  No small bowel findings to suggest acute mesenteric ischemia. 3.  Focal dissections involving the right and left common iliac arteries.  Stenosis of proximal SMA, s/p failed SMA stent c/b RP bleed on 9/10 - plan for SMA stent with Vascular today   Reglan for gut motility  Zofran for nausea     dextrose 5% + sodium chloride 0.45% with potassium chloride 10 mEq/L 1000 milliLiter(s) (30 mL/Hr) IV Continuous <Continuous>  GI prophylaxis, pantoprazole  Injectable 40 milliGRAM(s) IV Push every 12 hours  metoclopramide Injectable 10 milliGRAM(s) IV Push every 8 hours  ondansetron Injectable 4 milliGRAM(s) IV Push every 8 hours PRN Nausea and/or Vomiting    =======================    ENDOCRINE  =====================  Stress hyperglycemia, continue glucose control with admelog sliding scale   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   CT C/A/P 9/26: progressive ISABELLE opacities and L consolidations, multifocal pneumonia  BCx 1/2 on 10/1 +Enterobacter cloacae complex, BCx  10/14+ Serratia marcescens   SCX on 10/14 +Stenotrophomonas maltophilia, c/w Bactrim and Vancomycin   Vancomycin solution for C. diff prophylaxis    trimethoprim / sulfamethoxazole IVPB 300 milliGRAM(s) IV Intermittent every 6 hours  vancomycin    Solution 125 milliGRAM(s) Oral every 12 hours  vancomycin  IVPB 1000 milliGRAM(s) IV Intermittent every 12 hours      Patient requires continuous monitoring with bedside rhythm monitoring, pulse ox monitoring, and intermittent blood gas analysis. Care plan discussed with ICU care team. Patient remained critical and at risk for life threatening decompensation.     By signing my name below, I, Agnieszka Cherry, attest that this documentation has been prepared under the direction and in the presence of CLAUDIA Bey   Electronically signed: Cesia Shaffer, 10-18-21 @ 08:54    I, Luciano Ayala, personally performed the services described in this documentation. all medical record entries made by the luisibe were at my direction and in my presence. I have reviewed the chart and agree that the record reflects my personal performance and is accurate and complete  Electronically signed: CLAUDIA Bey        RICKY HAMPTON  MRN-[id number]  Patient is a 66y old  Male who presents with a chief complaint of Anemia, Supratherapeutic INR, Dark Stools (18 Oct 2021 07:46)    HPI:  65M PMH ACC/AHA stage D HF due to NICM HM2 LVAD , TV annuloplasty ring 9/12/17 as destination therapy due to severe peripheral artery disease with significant stenosis  SIADH, Depression, CKD-3 with hyperkalemia, past E. coli UTIs, driveline drainage (1/7/21) and COVID-19 (back in April 2020)  He was recently seen in clinic where he complained of abdominal pain and dark stools w constipation back in May. He presents to [facility] ER today weakness and fatigue, moderate and + Black stools for three days, on coumadin secondary to warfarin use in the setting of an LVAD. Patient has required transfusions for GIB in the past. Mostly recently back in jan 2021 pt had anemia with dark stools. No interventions was done at that time. However Last Endoscopy was done in July 2020 (negative). Today labs show patient is anemic with H/H of 4.5/16.3,. INR is 8.84 MAP in the 90s, Temp 35.1. He denies any chest pain, shortness of breath, dizziness, abd pain, nausea or vomiting.       (14 Jun 2021 16:57)      Surgery/Hospital Course:  6/14 admit for melena w/ anemia, INR 8.84   6/15 Capsul study (+) for small bowel bleed, balloon endoscopy (old blood in prox ileum); post EGD - septic w/ L opacity, re-intubated for concern for aspiration, TTE (Mod MR, decrease biV w/ interventricular septum boweing towards R)  6/17 bronch   6/20 +C Diff   6/22 CT C/A/P: Fluid filled colon which may be 2/2 rapid transit. Small bilateral pleffs with associates. Compressive atelectasis New ISABELLE & LLL  parenchymal opacities, suspicious for pneumonia. Moderate stenosis in the proximal superior mesenteric artery.   6/25 #8 Shiley trach at bedside   7/1 LVAD speed increased to 9200  7/2 Bronch  7/20 TC since 7/7. Patient transferred to SDU.   7/23 INR today 2.64.  H&H 7.3/24 this AM.  Will repeat CBC at noon, and will send stool guaiac Patient with persistent abdominal tenderness, rate of tube feeds decreased.  No nausea/vomiting.    7/24 INR today 2.4. H&H 9.1/28.6 low flow overnight /N&V, refusing Tube feeds on D5 1/2 normal  @50 cc/hr  7/25 INR 2.69  H&H 7.7/25.1 refusing Tube feeds on D5 1/2 normal  @50 cc/hr. This am + BM Melena Dr Oneill HF  aware- PRBC x1  GI team consulted -  NPO  plan on study in am-  D/w Dr Cadet Patient  to return to CTU for further management; 1PRBCS   7/27 Post op INR 2.2 today.  No bleeding. BC + for SM.  Pt is hypotensive requiring pressor and inotropic support.  ID follow up today on Cefepime will follow.  7/26 R PTC for PTX   7/28 CT C/A/P: sub q emphysema in R chest wall, GGO RUL, small ascites CTH negative; Abd US: GB thickening, pericholecystic fluid    7/31 Perchole drain in place continues to drain total output overnight 133.  Fever today 38.8   8/1 duplex LE negative   8/7 Patient with persistent abdominal pain, refusing tube feeds and medications, Psych consulted  8/13 CTA A/P ordered to r/o mesenteric ischemia 2/2 persistent anorexia, nausea, vomiting. Revealed:  Evaluation of the mesenteric vessels is limited by streak artifact from LVAD. There appears to be severe stenosis of the proximal SMA; abdominal mesenteric doppler is recommended for further evaluation. 2.  No small bowel findings to suggest acute mesenteric ischemia. 3.  Focal dissections involving the right and left common iliac arteries.  8/15: Cultures resulted BC 1/2 +GPC in clusters, SC enterobacter; mesenteric duplex: borderline stenosis of proximal SMA  8/27: CT C/A/P noncon: Nondular opacities in R lung apex w/cavitation, abd nl  8/31:  Continue current care, treatment of thyrotoxicosis with medications as per endocrine, d/c ABX as per team.   9/8 RUQ sono: Contracted gallbladder with cholecystostomy tube in place.  9/10 failed SMA stent, c/b RP bleed s/p 3U PRCB  9/12 CTA Abd/Pelvis L RP hematoma   9/24 Trach decannulated   9/26 intubated fro resp distress for increased WOB, LL pneumonia; CT C/A/P: progressive ISABELLE opacities and L consolidations, multifocal pneumonia   9/30 TTE (EF 25%, decreased RV, mod MR)   10/3 VT -> Lidocaine gtt   10/4 Trach size 6 DCT cuff  10/5 CT abd (neg for intra-abd abcess, severe stenosis of prox SMA and b/l iliac arteries)    Today:  Plan for SMA stent, cleared from cardiac surgery standpoint.     REVIEW OF SYSTEMS:  Gen: No fever  EYES/ENT: No visual changes;  No vertigo or throat pain   NECK: No pain   RES:  No shortness of breath or Cough  CARD: No chest pain   GI: +intermittently c/o abd pain and nausea   : No dysuria  NEURO: No weakness  SKIN: No itching, rashes     ICU Vital Signs Last 24 Hrs  T(C): 36.2 (18 Oct 2021 08:00), Max: 36.5 (17 Oct 2021 11:00)  T(F): 97.2 (18 Oct 2021 08:00), Max: 97.7 (17 Oct 2021 11:00)  HR: 100 (18 Oct 2021 08:00) (82 - 111)  BP: --  BP(mean): --  ABP: 101/73 (18 Oct 2021 08:00) (75/65 - 179/179)  ABP(mean): 85 (18 Oct 2021 08:00) (70 - 179)  RR: 32 (18 Oct 2021 08:00) (12 - 37)  SpO2: 95% (18 Oct 2021 08:00) (90% - 100%)      Physical Exam:  Gen:  NAD  CNS: moves all extremities to command   Neck: no JVD, +trach, +central line   RES : coarse breath sounds, no wheezing    CVS: +LVAD hum   Abd: Soft. Sybil drain with bilious fluid. Positive BS throughout. Mild RUQ abdominal tenderness by perc sybil.  Skin: No rash, erythema, cyanosis.  Vasc: Warm and well-perfused.  Ext:  no edema      ============================I/O===========================   I&O's Detail    17 Oct 2021 07:01  -  18 Oct 2021 07:00  --------------------------------------------------------  IN:    dextrose 5% + sodium chloride 0.45% w/ Additives: 720 mL    Enteral Tube Flush: 100 mL    IV PiggyBack: 1700 mL  Total IN: 2520 mL    OUT:    Drain (mL): 250 mL    Voided (mL): 1865 mL  Total OUT: 2115 mL    Total NET: 405 mL        ============================ LABS =========================                        10.6   6.15  )-----------( 188      ( 18 Oct 2021 01:03 )             33.3     10-18    133<L>  |  95<L>  |  5<L>  ----------------------------<  94  3.9   |  23  |  0.52    Ca    9.7      18 Oct 2021 01:03  Phos  3.0     10-18  Mg     1.5     10-18    TPro  7.6  /  Alb  3.9  /  TBili  0.8  /  DBili  x   /  AST  15  /  ALT  20  /  AlkPhos  153<H>  10-18    LIVER FUNCTIONS - ( 18 Oct 2021 01:03 )  Alb: 3.9 g/dL / Pro: 7.6 g/dL / ALK PHOS: 153 U/L / ALT: 20 U/L / AST: 15 U/L / GGT: x           PT/INR - ( 18 Oct 2021 01:03 )   PT: 17.7 sec;   INR: 1.50 ratio         PTT - ( 18 Oct 2021 01:03 )  PTT:29.2 sec  ABG - ( 18 Oct 2021 01:01 )  pH, Arterial: 7.42  pH, Blood: x     /  pCO2: 43    /  pO2: 119   / HCO3: 28    / Base Excess: 3.0   /  SaO2: 99.6        ======================Micro/Rad/Cardio=================  Culture: Reviewed   CXR: Reviewed  Echo:Reviewed  ======================================================  PAST MEDICAL & SURGICAL HISTORY:  CHF (congestive heart failure)    CAD (coronary artery disease)    Depression    Pleural effusion    History of 2019 novel coronavirus disease (COVID-19)  april 2020    Hemorrhoids    Bleeding hemorrhoids    Peripheral arterial disease    Claudication    BPH with urinary obstruction    ACC/AHA stage D systolic heart failure    Anticoagulation goal of INR 2.0 to 2.5    Falls    Clavicle fracture    CKD (chronic kidney disease), stage III    Iron deficiency anemia    H/O epistaxis    Vertigo    GI bleed    S/P TVR (tricuspid valve repair)    S/P ventricular assist device    S/P endoscopy      ====================ASSESSMENT ==============  Stage D Nonischemic Cardiomyopathy, Status Post HM2 on 9/2017    Cardiogenic shock  Hemodynamic instability   Acute hypoxemic respiratory failure s/p trach 6/25, decannulated on 9/24; Reintubated on 9/26   GI bleed , Status Post Enteroscopy 6/14  Anemia, in setting of melena   Chronic Kidney Disease  Stress hyperglycemia   C.diff positive on 6/20   Hypovolemic shock  Septic shock  Leukocytosis  GB thickening/percholecystic s/p perc sybil by IR 7/30  SMA stenosis  Serratia/citrobacter pneumonia 7/7  Stenotrophomonas pneumonia 8/1  Enterobacter pneumonia 8/13  Nasuea/vomiting  Deconditioning     Plan:  ====================== NEUROLOGY=====================  Continue close monitoring of neuro status   Off sedation     ==================== RESPIRATORY======================  Acute hypoxemic respiratory failure s/p #8 shiley trach on 6/25; decannulated on 9/24; Reintubated on 9/26; trach size 6 DCT cuff on 10/4   Continue close monitoring of respiratory rate and breathing pattern, following of ABG’s with A-line monitoring, continuous pulse oximetry monitoring.   C/w TC as tolerated     Mechanical Ventilation:  Mode: off    ====================CARDIOVASCULAR==================  Stage D Nonischemic Cardiomyopathy, Status Post HM2 on 9/2017; LVAD settings 9200, flow 5.4  TTE 10/4:  Severely decreased LV systolic function, Diffuse hypokinesis. Mild AR. No pericardial effusion   VT on 10/4, s/p Lidocaine drip, continue close tele monitoring     ===================HEMATOLOGIC/ONC ===================  CTA A/P on 9/12 +L RP hematoma   Acute blood loss anemia, monitor H&H/Plts   Holding coumadin and ASA given recurrent GI bleeding, continue monitoring LDH     ===================== RENAL =========================  Hx of CKD, continue monitoring urine output, I&OS, BUN/Cr   Replete lytes PRN. Keep K> 4 and Mg >2    ==================== GASTROINTESTINAL===================  TFs held in setting of ongoing nausea / recent emesis   CT A/P 10/5 neg for intra-abd abcess, severe stenosis of prox SMA and b/l iliac arteries  CTA A/P 8/13: Evaluation of the mesenteric vessels is limited by streak artifact from LVAD. There appears to be severe stenosis of the proximal SMA; abdominal mesenteric doppler is recommended for further evaluation. 2.  No small bowel findings to suggest acute mesenteric ischemia. 3.  Focal dissections involving the right and left common iliac arteries.  Stenosis of proximal SMA, s/p failed SMA stent c/b RP bleed on 9/10 - plan for SMA stent with Vascular today   Reglan for gut motility  Zofran for nausea     dextrose 5% + sodium chloride 0.45% with potassium chloride 10 mEq/L 1000 milliLiter(s) (30 mL/Hr) IV Continuous <Continuous>  GI prophylaxis, pantoprazole  Injectable 40 milliGRAM(s) IV Push every 12 hours  metoclopramide Injectable 10 milliGRAM(s) IV Push every 8 hours  ondansetron Injectable 4 milliGRAM(s) IV Push every 8 hours PRN Nausea and/or Vomiting    =======================    ENDOCRINE  =====================  Stress hyperglycemia, continue glucose control with admelog sliding scale   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   CT C/A/P 9/26: progressive ISABELLE opacities and L consolidations, multifocal pneumonia  BCx 1/2 on 10/1 +Enterobacter cloacae complex, BCx  10/14+ Serratia marcescens   SCX on 10/14 +Stenotrophomonas maltophilia, c/w Bactrim and Vancomycin   Vancomycin solution for C. diff prophylaxis    trimethoprim / sulfamethoxazole IVPB 300 milliGRAM(s) IV Intermittent every 6 hours  vancomycin    Solution 125 milliGRAM(s) Oral every 12 hours  vancomycin  IVPB 1000 milliGRAM(s) IV Intermittent every 12 hours      Patient requires continuous monitoring with bedside rhythm monitoring, pulse ox monitoring, and intermittent blood gas analysis. Care plan discussed with ICU care team. Patient remained critical and at risk for life threatening decompensation.     By signing my name below, I, Agnieszka Dilip, attest that this documentation has been prepared under the direction and in the presence of CLAUDIA Bey   Electronically signed: Cesia Shaffer, 10-18-21 @ 08:54    I, Luciano Ayala, personally performed the services described in this documentation. all medical record entries made by the luisibe were at my direction and in my presence. I have reviewed the chart and agree that the record reflects my personal performance and is accurate and complete  Electronically signed: CLAUDIA Bey

## 2021-10-18 NOTE — PROGRESS NOTE ADULT - ASSESSMENT
65 year old male with history of Stage D HF due to NICM on LVAD, TV annuloplasty ring 9/12/17 as destination therapy due to severe peripheral artery disease with significant stenosis  SIADH, Depression, CKD-3 with hyperkalemia, admitted 6/14/21 with symptomatic anemia with Hgb 4.5 in setting of INR 8.8, thereafter with complicated hospital course including VAP, serratia bacteremia with acalculous cholecystitis s/p percutaneous tube, abdominal pain s/p attempted SMA stent on 9/10/21, RP bleed, now reintubated in setting of aspiration PNA.     Endocrine consulted for management of hyperthyroidism in the setting of recent amiodarone use and multiple IV contrast studies (7/28, 8/13, 9/12, 9/26, 10/5) and steroid induced hyperglycemia on tube feeds, now resolved    1. 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 propranolol on hold due to low BP. Will taper down methimazole to 20mg daily today (10/18). Pt. was treated with steroids initially with better improvement in TFTs. Would recommend continuing steroids for more likely Type 2 AIT. Was switched from prednisone to hydrocortisone as would not take meds by mouth but only by IV. Has been on hydrocortisone 60mg q12h for the past few days. Decrease hydrocortisone to 40mg q12h x4 days then can probably go down to 30mg q12h for a few days. Can reassess need for further taper thereafter. Repeat FT4 on Monday, 10/25.  Assess bilirubin and LFTs. MMI can have rare SE of agranulocytosis and hepatic dysfunction  current increase in bili is multifactorial and likely also component of venous congestion and would not stop MMI for increase in bili,  monitor LFTs closely     2. Steroid induced hyperglycemia, resolved  -No need to check FSBG so frequently.    Jude Lr DO, Endocrinology Fellow  Pager [phone] from 9am to 5pm. After hours and on weekends, please call [phone].   65 year old male with history of Stage D HF due to NICM on LVAD, TV annuloplasty ring 9/12/17 as destination therapy due to severe peripheral artery disease with significant stenosis  SIADH, Depression, CKD-3 with hyperkalemia, admitted 6/14/21 with symptomatic anemia with Hgb 4.5 in setting of INR 8.8, thereafter with complicated hospital course including VAP, serratia bacteremia with acalculous cholecystitis s/p percutaneous tube, abdominal pain s/p attempted SMA stent on 9/10/21, RP bleed, now reintubated in setting of aspiration PNA.     Endocrine consulted for management of hyperthyroidism in the setting of recent amiodarone use and multiple IV contrast studies (7/28, 8/13, 9/12, 9/26, 10/5) and steroid induced hyperglycemia on tube feeds, now resolved    1. 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 propranolol on hold due to low BP. Will taper down methimazole to 20mg daily today (10/18). Pt. was treated with steroids initially with better improvement in TFTs. Would recommend continuing steroids for more likely Type 2 AIT. Was switched from prednisone to hydrocortisone as would not take meds by mouth but only by IV. Has been on hydrocortisone 60mg q12h for the past few days. Decrease hydrocortisone to 40mg q12h x4 days then can probably go down to 30mg q12h for a few days. Can reassess need for further taper thereafter. Repeat FT4 on Monday, 10/25.  Assess bilirubin and LFTs. MMI can have rare SE of agranulocytosis and hepatic dysfunction  current increase in bili is multifactorial and likely also component of venous congestion and would not stop MMI for increase in bili,  monitor LFTs closely     2. Steroid induced hyperglycemia, mostly resolved  -Receiving little insulin per correction scale. No need to check FSBG so frequently.    Discussed with primary team.    Jude Lr DO, Endocrinology Fellow  Pager [phone] from 9am to 5pm. After hours and on weekends, please call [phone].

## 2021-10-18 NOTE — BRIEF OPERATIVE NOTE - NSICDXBRIEFPOSTOP_GEN_ALL_CORE_FT
POST-OP DIAGNOSIS:  Mesenteric ischemia, chronic 10-Sep-2021 20:17:32  Cris Escamilla  
POST-OP DIAGNOSIS:  Mesenteric ischemia, chronic 10-Sep-2021 20:17:32  Cris Escamilla  
POST-OP DIAGNOSIS:  Respiratory failure 25-Jun-2021 17:20:40  Jude Oscar  
POST-OP DIAGNOSIS:  Respiratory failure 25-Jun-2021 17:20:40  Jude Oscar

## 2021-10-18 NOTE — PROGRESS NOTE ADULT - ASSESSMENT
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 coli UTIs, driveline drainage (1/7/21) and COVID-19, here initially for GIB prolonged hospital course, s/p tracheostomy, acalculous cholecystitis s/p perc dawn. Patient has persistent intermittent abdominal pain, CTA showing possible proximal SMA stenosis. CTA poor quality limited by significant streak artifact. Mesenteric duplex velocities without evidence of significant stenosis, however study unreliable in the setting of patient's augmented systolic function given LVAD. Now s/p diagnostic mesenteric angiogram with unsuccessful SMA stenting. 09/12 CTA obtained due to downtrending H/H with evidence of L RP hematoma without evidence of active extravasation, L groin wound stable. H/H stable. Vascular re-consulted for potential attempt at second intervention given improved patient condition    - Or today for aortogram, mesenteric angio    Vascular  [id number].  Vascular Surgery  [id number]

## 2021-10-18 NOTE — PROGRESS NOTE ADULT - SUBJECTIVE AND OBJECTIVE BOX
RICKY JOINT  MRN#: [id number]  Subjective:  Pulmonary progress  : recurrent Acute hypoxic respiratory Failure ,aspiration pneumonia, NICM  ,   65M PMH ACC/AHA stage D HF due to NICM HM2 LVAD , TV annuloplasty ring 17 as destination therapy due to severe peripheral artery disease with significant stenosis  SIADH, Depression, CKD-3 with hyperkalemia, past E. coli UTIs, driveline drainage (21) and COVID-19 (back in 2020)  He was recently seen in clinic where he complained of abdominal pain and dark stools w constipation back in May. He presents to [facility] ER today weakness and fatigue, moderate and + Black stools for three days, on coumadin secondary to warfarin use in the setting of an LVAD. Patient has required transfusions for GIB in the past. Mostly recently back in 2021 pt had anemia with dark stools. No interventions was done at that time. However Last Endoscopy was done in 2020 (negative). Today labs show patient is anemic with H/H of 4.5/16.3,. INR is 8.84 MAP in the 90s, Temp 35.1. He denies any chest pain, shortness of breath, dizziness, abd pain, nausea or vomiting. found to have  rectal bleeding underwent endoscopy ,old blood in the proximal ileum ,  develop sepsis with LL opacity given Antibiotics , Extubated , reintubated , Bronchoscopy on Zosyn for LL pneumonia  and Amiodarone S/P TV Annuloplasty , patient remain intubated on full ventilatory support .S/P multiple units of blood transfusion , remain on full ventilatory support on Precedex and propofol , new central IJ line , diarrhea C diff. +ve on po vancomycin and IV Flagyl,  mildly distended belly , fever start on cefepime 2gm q 8 hrs S/P tracheostomy .  new RT Subclavian central line continue on contact  isolation ,S/P  C-diff antibiotics, no more diarrhea back on full support mechanical ventilator , chest x ray show improvement in LLL air space disease, more awake and responsive on tube feeding no more diarrhea ,  no nausea or vomiting or diarrhea still very weak and tired , back on tube feeding ,still on po vancomycin , getting PT and OT at bed side ,   , no SOB getting stronger , improve muscle tone patient transfer to monitor bed still on contact isolation for C-Difficel colitis on 50% FI02, and change to Suresh  tube feeding still loose stool . H/H drop significantly require blood transfusion , most likely GI bleeding , IV heparin D/C ,  H/H is stable ., patient develop TR sided  pneumothorax require chest tube placement , RT IJ central line  placed , develop fever shaking chills , blood culture positive for serratia marcescens , start on cefepime .the patient  become hypoxic and hypotensive placed on full ventilatory support and Vasopressin , levophed and Dobutamine ,S/P blood transfusion on meropenem and vancomycin ,   , on and  off pressors , occasional agitation on Precedex .S/P IR cholecystostomy tube drainage placement in the RT upper Quadrant , resume anticoagulation chest x ray noted C-PAP trail lasted only for 2 hrs , new RT SC line and D/C RT IJ line , RT pig tail cathter has been removed , tolerating C-PAP trial placed on trach. collar 50% FI02 GI consultant noted on NG tube feeding as tolerated , develop AF RVR S/P  bolus Amiodarone  back to regular sinus Rhythm , Flat Affect depressed , back on tube feeding Vital AF at 60 cc/ hr .still intermittent abdominal pain , no fever saturation is accepted  back on full ventilatory support ,   on methimazole for hyperthyroidism ,  condition is the same ,still C/O Abdominal pain , white count is improving , no chest pain or SOB ,  .placed on Reglan 10 mg TID for gastric motility , depressed , withdrawn. , S/P  mesenteric angiogram , unable to stent SMA S/P 3 units of blood transfusion   RT IJ in place reassessed for stent in the SMA by vascular,  dark stool stable H/H ,surgical opinion for possible Lap cholecystectomy. over night events noted develop respiratory distress, , rectal bleeding, require intubation placed on full ventilatory support , FI02 is 50% also became hemianosmically unstable require triple pressor support with levophed , vasopressin and norsynephrine, pan culture placed  on Vancomycin IV and PO  and Zosyn, sedated with propofol kept NPO , new central line RT and left IJ cathter placed . Diflucan Added .fever of 38.5 weaned off  vasopressin , all culture are negative, resume tube feeding ,  white count is improving , on meropenem,  and bactrim   fever adding IV vancomycin and  vancomycin solution  , and Bactrim , S/P  tracheostomy , bleeding receiving blood transfusion on vasopressin , no more bleeding , no fever , more awake and responsive ,tolerating tube feeding , ID and heart failure follow up appreciated , depresses no weaning for now , magnesium is low to give supplement too keep Above 2 , potassium is low to give supplement , patient refuse vascular procedure for superior mesenteric artery occlusion C-PAP trial to trach. collar for Aortogram and mesenteric angiogram in AM      (2021 16:57)    PAST MEDICAL & SURGICAL HISTORY:  CHF (congestive heart failure)    CAD (coronary artery disease)  Depression    Pleural effusion    History of 2019 novel coronavirus disease (COVID-19)  2020    Hemorrhoids    Bleeding hemorrhoids    Peripheral arterial disease    Claudication    BPH with urinary obstruction    ACC/AHA stage D systolic heart failure  Anticoagulation goal of INR 2.0 to 2.5    Falls    Clavicle fracture    CKD (chronic kidney disease), stage III    Iron deficiency anemia    H/O epistaxis    Vertigo    GI bleed    S/P TVR (tricuspid valve repair)    S/P ventricular assist device    S/P endoscopy    OBJECTIVE:  ICU Vital Signs Last 24 Hrs  T(C): 38.2 (2021 10:00), Max: 38.5 (2021 12:00)  T(F): 100.8 (2021 10:00), Max: 101.3 (2021 12:00)  HR: 65 (2021 10:00) (61 - 69)  BP: --  BP(mean): --  ABP: 105/67 (2021 10:00) (90/54 - 113/64)  ABP(mean): 77 (2021 10:00) (63 - 77)  RR: 20 (2021 10:00) (19 - 35)  SpO2: 99% (2021 10:00) (96% - 100%)       @ 07:  -   @ 07:00  --------------------------------------------------------  IN: 2693.9 mL / OUT: 1415 mL / NET: 1278.9 mL     @ 07: @ 10:49  --------------------------------------------------------  IN: 420.8 mL / OUT: 115 mL / NET: 305.8 mL  PHYSICAL EXAM: Daily   Elderly male  on trach. collar  FI02 is 40% S/P tracheostomy   Daily Weight in k.4 (2021 00:00)  HEENT:     + NCAT  + EOMI  - Conjuctival edema   - Icterus   - Thrush   - ETT  + NGT/OGT  Neck:         + FROM  RT IJ , LT IJ  lines JVD  - Nodes - Masses + Mid-line trachea + Tracheostomy  Chest:            normal A-P diameter    Lungs:          + CTA   + Rhonchi    - Rales    - Wheezing + Decreased  LT BS   - Dullness R L  Cardiac:       + S1 + S2    + RRR   - Irregular   - S3  - S4    - Murmurs   - Rub   - Hamman’s sign   Abdomen:    + BS  + Soft + Non-tender - Distended - Organomegaly - PEG .cholecystostomy tube in place  Extremities:   - Cyanosis U/L   - Clubbing  U/L  + LE/UE Edema   + Capillary refill    + Pulses   Neuro:        - Awake   -  Alert   - Confused   - Lethargic   + Sedated  + Generalized Weakness  Skin:        - Rashes    - Erythema   + Normal incisions   + IV sites intact          HOSPITAL MEDICATIONS: All medications reviewed and analyzed  MEDICATIONS  (STANDING):  amiodarone    Tablet 200 milliGRAM(s) Oral daily  chlorhexidine 0.12% Liquid 15 milliLiter(s) Oral Mucosa every 12 hours  chlorhexidine 2% Cloths 1 Application(s) Topical <User Schedule>  dexmedetomidine Infusion 0.5 MICROgram(s)/kG/Hr (9.81 mL/Hr) IV Continuous <Continuous>  dextrose 50% Injectable 50 milliLiter(s) IV Push every 15 minutes  heparin  Infusion 400 Unit(s)/Hr (12.5 mL/Hr) IV Continuous <Continuous>  Hydromorphone  Injectable 0.5 milliGRAM(s) IV Push once  insulin lispro (ADMELOG) corrective regimen sliding scale   SubCutaneous every 6 hours  pantoprazole  Injectable 40 milliGRAM(s) IV Push every 12 hours  piperacillin/tazobactam IVPB.. 3.375 Gram(s) IV Intermittent every 8 hours  propofol Infusion 20 MICROgram(s)/kG/Min (9.42 mL/Hr) IV Continuous <Continuous>  sodium chloride 0.9% lock flush 3 milliLiter(s) IV Push every 8 hours  sodium chloride 0.9%. 1000 milliLiter(s) (10 mL/Hr) IV Continuous <Continuous>    MEDICATIONS  (PRN):  acetaminophen    Suspension .. 650 milliGRAM(s) Oral every 6 hours PRN Temp greater or equal to 38C (100.4F)    LABS: All Lab data reviewed and analyzed                         10.6   6.15  )-----------( 188      ( 18 Oct 2021 01:03 )             33.3    10-18    133<L>  |  95<L>  |  5<L>  ----------------------------<  94  3.9   |  23  |  0.52    Ca    9.7      18 Oct 2021 01:03  Phos  3.0     10-18  Mg     1.5     10-18    TPro  7.6  /  Alb  3.9  /  TBili  0.8  /  DBili  x   /  AST  15  /  ALT  20  /  AlkPhos  153<H>  10-18    Ca    9.4      15 Oct 2021 13:21  Phos  2.1     10-15  Mg     1.7     10-15    TPro  7.3  /  Alb  4.0  /  TBili  1.5<H>  /  DBili  x   /  AST  24  /  ALT  33  /  AlkPhos  136<H>  10-15                                                                                                                                                                        PTT - ( 2021 04:52 )  PTT:45.2 sec LIVER FUNCTIONS - ( 2021 00:42 )  Alb: 3.4 g/dL / Pro: 6.7 g/dL / ALK PHOS: 213 U/L / ALT: 15 U/L / AST: 24 U/L / GGT: x           RADIOLOGY: - Reviewed and analyzed RT Pig tail cathter  , LVAD HM2, CT scan of abdomen reviewed result noted

## 2021-10-18 NOTE — BRIEF OPERATIVE NOTE - NSICDXBRIEFPREOP_GEN_ALL_CORE_FT
PRE-OP DIAGNOSIS:  Respiratory failure 25-Jun-2021 17:20:29  Jude Oscar  
PRE-OP DIAGNOSIS:  Mesenteric ischemia, chronic 10-Sep-2021 20:17:28  Cris Escamilla  
PRE-OP DIAGNOSIS:  Respiratory failure 25-Jun-2021 17:20:29  Jude Oscar  
PRE-OP DIAGNOSIS:  Mesenteric ischemia, chronic 10-Sep-2021 20:17:28  Cris Escamilla

## 2021-10-18 NOTE — PROGRESS NOTE ADULT - PROBLEM SELECTOR PLAN 5
- sputum culture positive for stenotrophomonas,  most recent Bcx 10/14 positive for Serratia. On bactrim, vanc and PO vanc for cdif ppx  - Prior cholecystitis w/ per dawn drain with bilious output.

## 2021-10-18 NOTE — PROGRESS NOTE ADULT - SUBJECTIVE AND OBJECTIVE BOX
[facility] NUTRITION SUPPORT-- FOLLOW UP NOTE  --------------------------------------------------------------------------------  24 hour events/subjective: pt seen and examined. resting in bed. states he feels ok. remains npo. planned for vascular intervention today    Diet:  Diet, NPO after Midnight:      NPO Start Date: 17-Oct-2021,   NPO Start Time: 23:59 (10-17-21 @ 17:44)  Diet, NPO with Tube Feed:   Tube Feeding Modality: Nasogastric  Vital 1.5 Tank (VITAL1.5RTH)  Total Volume for 24 Hours (mL): 960  Continuous  Starting Tube Feed Rate mL per Hour: 20  Increase Tube Feed Rate by (mL): 5     Every 6 hours  Until Goal Tube Feed Rate (mL per Hour): 40  Tube Feed Duration (in Hours): 24  Tube Feed Start Time: 10:00 (09-29-21 @ 10:11)      PAST HISTORY  --------------------------------------------------------------------------------  No significant changes to PMH, PSH, FHx, SHx, unless otherwise noted    ALLERGIES & MEDICATIONS  --------------------------------------------------------------------------------  Allergies    Amiodarone Hydrochloride (Other (Severe))    Intolerances      Standing Inpatient Medications  chlorhexidine 0.12% Liquid 15 milliLiter(s) Oral Mucosa two times a day  chlorhexidine 2% Cloths 1 Application(s) Topical <User Schedule>  dextrose 5% + sodium chloride 0.45% with potassium chloride 10 mEq/L 1000 milliLiter(s) IV Continuous <Continuous>  hydrocortisone sodium succinate Injectable 60 milliGRAM(s) IV Push every 12 hours  insulin lispro (ADMELOG) corrective regimen sliding scale   SubCutaneous every 6 hours  methimazole 20 milliGRAM(s) Oral <User Schedule>  metoclopramide Injectable 10 milliGRAM(s) IV Push every 8 hours  pantoprazole  Injectable 40 milliGRAM(s) IV Push every 12 hours  trimethoprim / sulfamethoxazole IVPB 300 milliGRAM(s) IV Intermittent every 6 hours  vancomycin    Solution 125 milliGRAM(s) Oral every 12 hours  vancomycin  IVPB 1000 milliGRAM(s) IV Intermittent every 12 hours    PRN Inpatient Medications  ondansetron Injectable 4 milliGRAM(s) IV Push every 8 hours PRN        REVIEW OF SYSTEMS  --------------------------------------------------------------------------------  General:  as per hpi  HEENT:  no headaches, no vision changes, no ear pain, no epistaxis, no throat pain   CV:  no chest pain, no palpitations  Resp:  no dyspnea  GI:  as per hpi  :  no pain, no bleeding, no dysuria  Muscle:  no pain, no weakness  Neuro:  no numbness, no tingling, no memory problems  Psych:  no insomnia  Endocrine:  no cold/heat intolerance  Heme: no ecchymosis, no easy bruising  Skin:  no rash, no edema      VITALS/PHYSICAL EXAM  --------------------------------------------------------------------------------  T(C): 35.9 (10-18-21 @ 04:00), Max: 36.5 (10-17-21 @ 08:00)  HR: 95 (10-18-21 @ 07:00) (82 - 111)  BP: --  RR: 12 (10-18-21 @ 07:00) (12 - 37)  SpO2: 99% (10-18-21 @ 07:00) (90% - 100%)  Wt(kg): --      10-17-21 @ 07:01  -  10-18-21 @ 07:00  --------------------------------------------------------  IN: 2520 mL / OUT: 2115 mL / NET: 405 mL      I&O's Detail    17 Oct 2021 07:01  -  18 Oct 2021 07:00  --------------------------------------------------------  IN:    dextrose 5% + sodium chloride 0.45% w/ Additives: 720 mL    Enteral Tube Flush: 100 mL    IV PiggyBack: 1700 mL  Total IN: 2520 mL    OUT:    Drain (mL): 250 mL    Voided (mL): 1865 mL  Total OUT: 2115 mL    Total NET: 405 mL          Physical Exam:  pt refused exam upon eval this am  resting in bed, appears comfortable, frail, +ngt in place      LABS/STUDIES  --------------------------------------------------------------------------------              10.6   6.15  >-----------<  188      [10-18-21 @ 01:03]              33.3     133  |  95  |  5   ----------------------------<  94      [10-18-21 @ 01:03]  3.9   |  23  |  0.52        Ca     9.7     [10-18-21 @ 01:03]      Mg     1.5     [10-18-21 @ 01:03]      Phos  3.0     [10-18-21 @ 01:03]    TPro  7.6  /  Alb  3.9  /  TBili  0.8  /  DBili  x   /  AST  15  /  ALT  20  /  AlkPhos  153  [10-18-21 @ 01:03]    PT/INR: PT 17.7 , INR 1.50       [10-18-21 @ 01:03]  PTT: 29.2       [10-18-21 @ 01:03]          [10-18-21 @ 01:03]    Ca ionizedBlood Gas Arterial - Calcium, Ionized: 1.33 mmol/L (10-18-21 @ 01:01)  Blood Gas Arterial - Calcium, Ionized: 1.35 mmol/L (10-17-21 @ 00:20)    Creatinine Trend:  POC glucoseGlucose, Serum: 94 mg/dL (10-18-21 @ 01:03)  CAPILLARY BLOOD GLUCOSE      POCT Blood Glucose.: 90 mg/dL (18 Oct 2021 05:32)  POCT Blood Glucose.: 96 mg/dL (18 Oct 2021 00:54)  POCT Blood Glucose.: 91 mg/dL (17 Oct 2021 17:05)  POCT Blood Glucose.: 84 mg/dL (17 Oct 2021 11:07)    Prealbumin  Triglycerides     [facility] NUTRITION SUPPORT-- FOLLOW UP NOTE  --------------------------------------------------------------------------------  24 hour events/subjective: pt seen and examined. resting in bed. states he feels ok. remains npo. planned for vascular intervention today. repeat bld cxs pending    Diet:  Diet, NPO after Midnight:      NPO Start Date: 17-Oct-2021,   NPO Start Time: 23:59 (10-17-21 @ 17:44)  Diet, NPO with Tube Feed:   Tube Feeding Modality: Nasogastric  Vital 1.5 Tank (VITAL1.5RTH)  Total Volume for 24 Hours (mL): 960  Continuous  Starting Tube Feed Rate mL per Hour: 20  Increase Tube Feed Rate by (mL): 5     Every 6 hours  Until Goal Tube Feed Rate (mL per Hour): 40  Tube Feed Duration (in Hours): 24  Tube Feed Start Time: 10:00 (09-29-21 @ 10:11)      PAST HISTORY  --------------------------------------------------------------------------------  No significant changes to PMH, PSH, FHx, SHx, unless otherwise noted    ALLERGIES & MEDICATIONS  --------------------------------------------------------------------------------  Allergies    Amiodarone Hydrochloride (Other (Severe))    Intolerances      Standing Inpatient Medications  chlorhexidine 0.12% Liquid 15 milliLiter(s) Oral Mucosa two times a day  chlorhexidine 2% Cloths 1 Application(s) Topical <User Schedule>  dextrose 5% + sodium chloride 0.45% with potassium chloride 10 mEq/L 1000 milliLiter(s) IV Continuous <Continuous>  hydrocortisone sodium succinate Injectable 60 milliGRAM(s) IV Push every 12 hours  insulin lispro (ADMELOG) corrective regimen sliding scale   SubCutaneous every 6 hours  methimazole 20 milliGRAM(s) Oral <User Schedule>  metoclopramide Injectable 10 milliGRAM(s) IV Push every 8 hours  pantoprazole  Injectable 40 milliGRAM(s) IV Push every 12 hours  trimethoprim / sulfamethoxazole IVPB 300 milliGRAM(s) IV Intermittent every 6 hours  vancomycin    Solution 125 milliGRAM(s) Oral every 12 hours  vancomycin  IVPB 1000 milliGRAM(s) IV Intermittent every 12 hours    PRN Inpatient Medications  ondansetron Injectable 4 milliGRAM(s) IV Push every 8 hours PRN        REVIEW OF SYSTEMS  --------------------------------------------------------------------------------  General:  as per hpi  HEENT:  no headaches, no vision changes, no ear pain, no epistaxis, no throat pain   CV:  no chest pain, no palpitations  Resp:  no dyspnea  GI:  as per hpi  :  no pain, no bleeding, no dysuria  Muscle:  no pain, no weakness  Neuro:  no numbness, no tingling, no memory problems  Psych:  no insomnia  Endocrine:  no cold/heat intolerance  Heme: no ecchymosis, no easy bruising  Skin:  no rash, no edema      VITALS/PHYSICAL EXAM  --------------------------------------------------------------------------------  T(C): 35.9 (10-18-21 @ 04:00), Max: 36.5 (10-17-21 @ 08:00)  HR: 95 (10-18-21 @ 07:00) (82 - 111)  BP: --  RR: 12 (10-18-21 @ 07:00) (12 - 37)  SpO2: 99% (10-18-21 @ 07:00) (90% - 100%)  Wt(kg): --      10-17-21 @ 07:01  -  10-18-21 @ 07:00  --------------------------------------------------------  IN: 2520 mL / OUT: 2115 mL / NET: 405 mL      I&O's Detail    17 Oct 2021 07:01  -  18 Oct 2021 07:00  --------------------------------------------------------  IN:    dextrose 5% + sodium chloride 0.45% w/ Additives: 720 mL    Enteral Tube Flush: 100 mL    IV PiggyBack: 1700 mL  Total IN: 2520 mL    OUT:    Drain (mL): 250 mL    Voided (mL): 1865 mL  Total OUT: 2115 mL    Total NET: 405 mL          Physical Exam:  pt refused exam upon eval this am  resting in bed, appears comfortable, frail, +ngt in place      LABS/STUDIES  --------------------------------------------------------------------------------              10.6   6.15  >-----------<  188      [10-18-21 @ 01:03]              33.3     133  |  95  |  5   ----------------------------<  94      [10-18-21 @ 01:03]  3.9   |  23  |  0.52        Ca     9.7     [10-18-21 @ 01:03]      Mg     1.5     [10-18-21 @ 01:03]      Phos  3.0     [10-18-21 @ 01:03]    TPro  7.6  /  Alb  3.9  /  TBili  0.8  /  DBili  x   /  AST  15  /  ALT  20  /  AlkPhos  153  [10-18-21 @ 01:03]    PT/INR: PT 17.7 , INR 1.50       [10-18-21 @ 01:03]  PTT: 29.2       [10-18-21 @ 01:03]          [10-18-21 @ 01:03]    Ca ionizedBlood Gas Arterial - Calcium, Ionized: 1.33 mmol/L (10-18-21 @ 01:01)  Blood Gas Arterial - Calcium, Ionized: 1.35 mmol/L (10-17-21 @ 00:20)    Creatinine Trend:  POC glucoseGlucose, Serum: 94 mg/dL (10-18-21 @ 01:03)  CAPILLARY BLOOD GLUCOSE      POCT Blood Glucose.: 90 mg/dL (18 Oct 2021 05:32)  POCT Blood Glucose.: 96 mg/dL (18 Oct 2021 00:54)  POCT Blood Glucose.: 91 mg/dL (17 Oct 2021 17:05)  POCT Blood Glucose.: 84 mg/dL (17 Oct 2021 11:07)    Prealbumin  Triglycerides

## 2021-10-18 NOTE — PROGRESS NOTE ADULT - PROBLEM SELECTOR PLAN 1
- ACC/AHA stage D systolic heart failure s/p LVAD   - stable without evidence of device malfunction  - MAP goal 70, currently off pressors

## 2021-10-18 NOTE — PRE-ANESTHESIA EVALUATION ADULT - NSPROPOSEDPROCEDFT_GEN_ALL_CORE
Mesenteric Angio
EGD balloon enteroscopy
Mesenteric Angiogram and Stent Placement
Percutaneous Cholecystostomy
tracheostomy

## 2021-10-18 NOTE — DIETITIAN INITIAL EVALUATION ADULT. - PROBLEM SELECTOR PLAN 1
Francine, fellow; Irais, PGY1 Dayne Francine, fellow MIGUEL Blood Continue with current medication regimen  AC therapy    cc x 1 IVB  Admit to 2 [facility] floor   Blood cultures x 1   Urin culture   Tylenol 650 mg PO every 6 hours as needed for fever > 38.5  CHF consult called and following   Plan of care Discussed with attending   D/C Plan home once medically cleared

## 2021-10-18 NOTE — PROGRESS NOTE ADULT - ATTENDING COMMENTS
Remains weak and debilitated. Has temporal wasting, he has not been tolerating feeds.   PLan for SMA stent today. Will have to aim for minimum antiplatelet therapy due to his h/o GI bleed.   No VAD or DL issues at this point.

## 2021-10-18 NOTE — PROGRESS NOTE ADULT - SUBJECTIVE AND OBJECTIVE BOX
Chief Complaint: Abnormal TFTs    History: Having N/V still. CP and palpitations improved. No eye complaints.    PHYSICAL EXAM:  VITALS: T(C): 36.2 (10-18-21 @ 15:55)  T(F): 97.2 (10-18-21 @ 12:00), Max: 97.2 (10-18-21 @ 08:00)  HR: 106 (10-18-21 @ 16:00) (82 - 106)  BP: --  RR:  (12 - 32)  SpO2:  (95% - 100%)  Wt(kg): --  General: Frail male, No acute distress, not speaking but nodding appropriately.   Eye:  No proptosis.  Respiratory:  Respirations are non-labored, Symmetric chest wall expansion.  Cardiovascular:  Normal rate, irregular rhythm, No edema.  Gastrointestinal:  Non-distended. Palpatory exam declined by patient.  Integumentary:  Dry.      POCT Blood Glucose.: 100 mg/dL (10-18-21 @ 11:32)  POCT Blood Glucose.: 90 mg/dL (10-18-21 @ 05:32)  POCT Blood Glucose.: 96 mg/dL (10-18-21 @ 00:54)  POCT Blood Glucose.: 91 mg/dL (10-17-21 @ 17:05)  POCT Blood Glucose.: 84 mg/dL (10-17-21 @ 11:07)  POCT Blood Glucose.: 89 mg/dL (10-17-21 @ 06:30)  POCT Blood Glucose.: 106 mg/dL (10-17-21 @ 00:02)  POCT Blood Glucose.: 86 mg/dL (10-16-21 @ 17:34)  POCT Blood Glucose.: 177 mg/dL (10-16-21 @ 13:11)  POCT Blood Glucose.: 118 mg/dL (10-16-21 @ 06:48)      10-18    133<L>  |  95<L>  |  5<L>  ----------------------------<  94  3.9   |  23  |  0.52    EGFR if : 130  EGFR if non : 112    Ca    9.7      10-18  Mg     1.5     10-18  Phos  3.0     10-18    TPro  7.6  /  Alb  3.9  /  TBili  0.8  /  DBili  x   /  AST  15  /  ALT  20  /  AlkPhos  153<H>  10-18          Thyroid Function Tests:  10-18 @ 13:02 TSH -- FreeT4 2.5 T3 67 Anti TPO -- Anti Thyroglobulin Ab -- TSI --  10-16 @ 01:37 TSH -- FreeT4 -- T3 68 Anti TPO -- Anti Thyroglobulin Ab -- TSI --                         Chief Complaint: Abnormal TFTs    History: Having N/V still. CP and palpitations improved. No eye complaints.    PHYSICAL EXAM:  VITALS: T(C): 36.2 (10-18-21 @ 15:55)  T(F): 97.2 (10-18-21 @ 12:00), Max: 97.2 (10-18-21 @ 08:00)  HR: 106 (10-18-21 @ 16:00) (82 - 106)  BP: --  RR:  (12 - 32)  SpO2:  (95% - 100%)  Wt(kg): --  General: Frail male, No acute distress, not speaking but nodding appropriately.   Eye:  No proptosis.  Respiratory:  Respirations are non-labored, Symmetric chest wall expansion.  Cardiovascular:  Normal rate, irregular rhythm, No edema.  Gastrointestinal:  Non-distended. Palpatory exam declined by patient.  Integumentary:  Dry.      POCT Blood Glucose.: 100 mg/dL (10-18-21 @ 11:32)  POCT Blood Glucose.: 90 mg/dL (10-18-21 @ 05:32)  POCT Blood Glucose.: 96 mg/dL (10-18-21 @ 00:54)  POCT Blood Glucose.: 91 mg/dL (10-17-21 @ 17:05)  POCT Blood Glucose.: 84 mg/dL (10-17-21 @ 11:07)  POCT Blood Glucose.: 89 mg/dL (10-17-21 @ 06:30)  POCT Blood Glucose.: 106 mg/dL (10-17-21 @ 00:02)  POCT Blood Glucose.: 86 mg/dL (10-16-21 @ 17:34)  POCT Blood Glucose.: 177 mg/dL (10-16-21 @ 13:11)  POCT Blood Glucose.: 118 mg/dL (10-16-21 @ 06:48)      10-18    133<L>  |  95<L>  |  5<L>  ----------------------------<  94  3.9   |  23  |  0.52    EGFR if : 130  EGFR if non : 112    Ca    9.7      10-18  Mg     1.5     10-18  Phos  3.0     10-18    TPro  7.6  /  Alb  3.9  /  TBili  0.8  /  DBili  x   /  AST  15  /  ALT  20  /  AlkPhos  153<H>  10-18          Thyroid Function Tests:  10-18 @ 13:02  FreeT4 2.5 T3 67   10-16 @ 01:37  T3 68

## 2021-10-18 NOTE — PROGRESS NOTE ADULT - SUBJECTIVE AND OBJECTIVE BOX
Subjective: Patient seen and examined resting in bed. Going for SMA stent with Vascular today    Medications:  chlorhexidine 0.12% Liquid 15 milliLiter(s) Oral Mucosa two times a day  chlorhexidine 2% Cloths 1 Application(s) Topical <User Schedule>  dextrose 5% + sodium chloride 0.45% with potassium chloride 10 mEq/L 1000 milliLiter(s) IV Continuous <Continuous>  hydrocortisone sodium succinate Injectable 60 milliGRAM(s) IV Push every 12 hours  insulin lispro (ADMELOG) corrective regimen sliding scale   SubCutaneous every 6 hours  methimazole 20 milliGRAM(s) Oral <User Schedule>  metoclopramide Injectable 10 milliGRAM(s) IV Push every 8 hours  ondansetron Injectable 4 milliGRAM(s) IV Push every 8 hours PRN  pantoprazole  Injectable 40 milliGRAM(s) IV Push every 12 hours  trimethoprim / sulfamethoxazole IVPB 300 milliGRAM(s) IV Intermittent every 6 hours  vancomycin    Solution 125 milliGRAM(s) Oral every 12 hours  vancomycin  IVPB 1000 milliGRAM(s) IV Intermittent every 12 hours      ICU Vital Signs Last 24 Hrs  T(C): 36.2 (18 Oct 2021 08:00), Max: 36.5 (17 Oct 2021 11:00)  T(F): 97.2 (18 Oct 2021 08:00), Max: 97.7 (17 Oct 2021 11:00)  HR: 100 (18 Oct 2021 08:00) (82 - 111)  BP: --  BP(mean): --  ABP: 101/73 (18 Oct 2021 08:00) (75/65 - 179/179)  ABP(mean): 85 (18 Oct 2021 08:00) (70 - 179)  RR: 32 (18 Oct 2021 08:00) (12 - 37)  SpO2: 95% (18 Oct 2021 08:00) (90% - 100%)      Weight in k.3 (10-18 @ 00:00)    I&O's Summary    17 Oct 2021 07:01  -  18 Oct 2021 07:00  --------------------------------------------------------  IN: 2520 mL / OUT: 2115 mL / NET: 405 mL    18 Oct 2021 07:01  -  18 Oct 2021 09:01  --------------------------------------------------------  IN: 60 mL / OUT: 250 mL / NET: -190 mL        Physical Exam  General: No distress. Comfortable.  Neck: +trach collar   Chest: Clear to auscultation bilaterally  CV: +VAD hum  Abdomen: Soft, non-distended, tenderness over epigastric region  Neurology: Alert and oriented times three.    LVAD Interrogation  VAD TYPE HM 2  Speed 9200  Flow 5.5  Power 5.9  PI 5.9  Assessment of driveline exit site: driveline exit site c/d/i  Programming changes: no changes made    Labs:                        10.6   6.15  )-----------( 188      ( 18 Oct 2021 01:03 )             33.3     1018    133<L>  |  95<L>  |  5<L>  ----------------------------<  94  3.9   |  23  |  0.52    Ca    9.7      18 Oct 2021 01:03  Phos  3.0     10-18  Mg     1.5     10-18    TPro  7.6  /  Alb  3.9  /  TBili  0.8  /  DBili  x   /  AST  15  /  ALT  20  /  AlkPhos  153<H>  10-18    PT/INR - ( 18 Oct 2021 01:03 )   PT: 17.7 sec;   INR: 1.50 ratio         PTT - ( 18 Oct 2021 01:03 )  PTT:29.2 sec          Lactate Dehydrogenase, Serum: 229 U/L (10-18 @ 01:03)  Lactate Dehydrogenase, Serum: 214 U/L (10-17 @ 00:30)  Lactate Dehydrogenase, Serum: 218 U/L (10-16 @ 00:44)

## 2021-10-18 NOTE — PRE-ANESTHESIA EVALUATION ADULT - NS MD HP INPLANTS MED DEV
Left Ventricular Assist Device

## 2021-10-18 NOTE — PRE-ANESTHESIA EVALUATION ADULT - NSANTHOSAYNRD_GEN_A_CORE
No. SIL screening performed.  STOP BANG Legend: 0-2 = LOW Risk; 3-4 = INTERMEDIATE Risk; 5-8 = HIGH Risk

## 2021-10-18 NOTE — BRIEF OPERATIVE NOTE - NSICDXBRIEFPROCEDURE_GEN_ALL_CORE_FT
PROCEDURES:  Angiogram, mesenteric vessels 10-Sep-2021 20:15:59  Cris Escamilla  
PROCEDURES:  Angiogram, mesenteric vessels 10-Sep-2021 20:15:59  Cris Escamilla  Insertion, stent, artery, superior mesenteric 18-Oct-2021 20:11:57  Cris Escamilla  
PROCEDURES:  Open tracheostomy 25-Jun-2021 17:20:16  Jude Oscar  
PROCEDURES:  Open tracheostomy 25-Jun-2021 17:20:16  Jude Oscar

## 2021-10-18 NOTE — PROGRESS NOTE ADULT - ASSESSMENT
Assessment and Recommendation:   · Assessment	  Assessment and recommendation :  Recurrent Acute hypoxic respiratory Failure  FI02 is 40% S/P tracheostomy  on track. collar   Acute blood loss anemia S/P multiple  blood transfusion post tracheostomy   recurrent  septic shock  weaned off  vasopressin   hemorrhagic shock receiving 2 unit of blood transfusion   pan culture, fever  on IV meropenem  ,IV vancomycin and  po vancomycin   S/P cholecystostomy tube placement by IR    patient refuse repeat vacular procedure   AF RVR back to regular sinus Rhythm   Non ischemic cardiomyopathy continue ACE inhibitor and B-Blockers   mesenteric ischemia failure to stent SMA , no plan for repeated trial   S/P 3 unit of blood transfusion   for vascular study of mesenteric artery in AM   INR is very high repeat , no evidence of bleeding   Stage D systolic heart failure S/P LVAD HM2   MH2 LVAD  with  TV Annuloplasty  Severe peripheral vascular disease   severe hyperglycemia on insulin coverage    Reglan 10 mg for Gastric Motility   hyperthyroidism on Methimazole 10mg TID   critical care polyneuropathy   Anemia of Acute blood Loss   severe protein caloric malnutrition   magnesium supplement keep above 2   potasium supplement   Chronic kidney disease stage III  Depressed and withdrawn   resume NG tube feeding   GI prophylaxis with PPI   Discussed with TCV team

## 2021-10-18 NOTE — PROGRESS NOTE ADULT - ATTENDING COMMENTS
Pt in the OR yesterday evening so unable to see on rounds but I saw him this morning. He has a NGT in [facility] and is being started on trickle feeds so can restart him on steroids. Taper as thus:  -Stop IV Hydrocortisone.  -Prednisone 20mg po x 2 days, then 15mg po qdaily x 2 days, then 10mg po qdaily x 2 days then keep on 5mg po qdaily until we recheck his levels  -Check FT3 and TT3 on Monday 10/25/21.  -Discussed with Michaelle Corcoran and MONTSE Beatty MD  Endocrinology Attending  Mondays and Tuesdays 9am-6pm: [phone] (pager)  Other days, night and weekend: [phone]

## 2021-10-18 NOTE — PROGRESS NOTE ADULT - ASSESSMENT
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 coli UTIs, driveline drainage (1/7/21) and COVID-19, here initially for GIB prolonged hospital course, s/p tracheostomy, acalculous cholecystitis s/p perc dawn. Patient has persistent intermittent abdominal pain, and was being evaluated for chronic mesenteric ischemia vs SMA syndrome.  8/13 bld cxs positive. TPN was consulted for Protein Calorie Malnutrition, Prealb 11, prior a1c 5.6, tg 141. Mesenteric duplex showied borderline stenosis of prox SMA,  Pt s/p Mesenteric angiogram 9/10 unable to place stent. Tube feeds remain on hold given nausea/emesis    -Remains NPO  -Suspect hyperthyroidism contributing in part to metabolic issues; levels have improved, endo following, repeat labs pending  -Recurrent abdominal pain/vomiting- as above, multifactorial- also w sma stenosis, planned for OR today for aortogram/mesenteric angio, f/u vascular recs  -H/o fever- abx as per ID, f/u repeat bld cxs  -Electrolyte Imbalance Risk- monitor and replete elytes as needed  -Anemia- rec iron supp when feasible  -Palliative care following for goc management   -Further management as per CTU; will remain available as needed    plan discussed with Dr. Soliz, agreeable with above    TPN pager [phone], spectra [phone]  M-F 6A-4P, Weekends and holidays 8A-12P

## 2021-10-18 NOTE — PROGRESS NOTE ADULT - ASSESSMENT
67 YO M with a history of stage D NICM s/p HM2 on 9/2017 as DT (due to severe PAD) with TV ring, prior COVID-19 infection 4/2020, recurrent syncope post LVAD s/p ILR, and chronic abdominal pain with prior negative workup who was admitted 6/14/21 with symptomatic anemia with Hgb 4.5 in setting of INR 8.8 without hemodynamic instability and has since had a protracted hospitalization. He was transfused and underwent VCE which showed active bleeding in the mid small bowel but subsequent enteroscopy 6/15 did not reveal any active bleeding. He acutely decompensated after procedure with fever/hypertension, low flow alarms, and pulmonary infiltrate with hypoxia requiring intubation from probable aspiration PNA. He was unable to extubated and has since undergone tracheostomy but tolerating persistent trach collar and nearing ability for decannulation. 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 He underwent angiogram on 9/10, stent was unable to be placed and course was then complicated by RP bleed. He continued to have persistent leukocytosis and febrile episodes and was noted to have positive sputum culture for serratia marcescens and completed his course of abx. He was initially decannulated on 9/23. Recently he started having multiples of melena, emesis and went into acte respiratory distress and was ultimately re-intubated on 9/26.     He had blood cultures are positive for enterobacter cloacae/serratia, remains on IV antibiotics. He is s/p trach with ENT on 10/4. Sputum cultures positive for stenotrophomonas and blood cultures positive for serratia on bactrim. Plan for SMA stent with Vascular today

## 2021-10-18 NOTE — PRE-ANESTHESIA EVALUATION ADULT - LAST ECHOCARDIOGRAM
reviewed - severely reduced EF, LVAD, decreased RV fxn, TV annuloplasty

## 2021-10-18 NOTE — PRE-ANESTHESIA EVALUATION ADULT - NSANTHRISKSRD_GEN_ALL_CORE
ASA of 4, 4E, 5 or 5E

## 2021-10-19 NOTE — PROGRESS NOTE ADULT - ASSESSMENT
Assessment and Recommendation:   · Assessment	  Assessment and recommendation :  Recurrent Acute hypoxic respiratory Failure  FI02 is 40% S/P tracheostomy  on track. collar   Acute blood loss anemia S/P multiple  blood transfusion post tracheostomy   recurrent  septic shock  weaned off  vasopressin   hemorrhagic shock receiving 2 unit of blood transfusion   pan culture, fever  on IV meropenem  ,IV vancomycin and  po vancomycin   S/P cholecystostomy tube placement by IR    patient is S/P  repeat vacular procedure and SMA stent x2   AF RVR back to regular sinus Rhythm   Non ischemic cardiomyopathy continue ACE inhibitor and B-Blockers   mesenteric ischemia failure to stent SMA , no plan for repeated trial   S/P 3 unit of blood transfusion   for vascular study of mesenteric artery in AM   INR is very high repeat , no evidence of bleeding   Stage D systolic heart failure S/P LVAD HM2   MH2 LVAD  with  TV Annuloplasty  Severe peripheral vascular disease   severe hyperglycemia on insulin coverage    Reglan 10 mg for Gastric Motility   hyperthyroidism on Methimazole 10mg TID   critical care polyneuropathy   Anemia of Acute blood Loss   severe protein caloric malnutrition   magnesium supplement keep above 2   potasium supplement   Chronic kidney disease stage III  Depressed and withdrawn   resume NG tube feeding   GI prophylaxis with PPI   Discussed with TCV team

## 2021-10-19 NOTE — PROGRESS NOTE ADULT - ATTENDING COMMENTS
S/p SMA stent placement yesterday.   Started on ASA/plavix (s/p plavix load). Should minimize antiplatelet (mono vs dual) if possible given h/o GI bleed.   Monitor H/H.  Restarting trickle feedings, high risk of refeeding syndrome.   No LVAD issues.

## 2021-10-19 NOTE — PROGRESS NOTE ADULT - PROBLEM SELECTOR PLAN 3
- S/p SMA stent 10/18  - Was started on aspirin and plavix by vascular, please f/u with vascular if only one antiplatelet agent can be used  - Restarting trickle feedings

## 2021-10-19 NOTE — PROGRESS NOTE ADULT - SUBJECTIVE AND OBJECTIVE BOX
RICKY JOINT  MRN#: [id number]  Subjective:  Pulmonary progress  : recurrent Acute hypoxic respiratory Failure ,aspiration pneumonia, NICM  ,   65M PMH ACC/AHA stage D HF due to NICM HM2 LVAD , TV annuloplasty ring 17 as destination therapy due to severe peripheral artery disease with significant stenosis  SIADH, Depression, CKD-3 with hyperkalemia, past E. coli UTIs, driveline drainage (21) and COVID-19 (back in 2020)  He was recently seen in clinic where he complained of abdominal pain and dark stools w constipation back in May. He presents to [facility] ER today weakness and fatigue, moderate and + Black stools for three days, on coumadin secondary to warfarin use in the setting of an LVAD. Patient has required transfusions for GIB in the past. Mostly recently back in 2021 pt had anemia with dark stools. No interventions was done at that time. However Last Endoscopy was done in 2020 (negative). Today labs show patient is anemic with H/H of 4.5/16.3,. INR is 8.84 MAP in the 90s, Temp 35.1. He denies any chest pain, shortness of breath, dizziness, abd pain, nausea or vomiting. found to have  rectal bleeding underwent endoscopy ,old blood in the proximal ileum ,  develop sepsis with LL opacity given Antibiotics , Extubated , reintubated , Bronchoscopy on Zosyn for LL pneumonia  and Amiodarone S/P TV Annuloplasty , patient remain intubated on full ventilatory support .S/P multiple units of blood transfusion , remain on full ventilatory support on Precedex and propofol , new central IJ line , diarrhea C diff. +ve on po vancomycin and IV Flagyl,  mildly distended belly , fever start on cefepime 2gm q 8 hrs S/P tracheostomy .  new RT Subclavian central line continue on contact  isolation ,S/P  C-diff antibiotics, no more diarrhea back on full support mechanical ventilator , chest x ray show improvement in LLL air space disease, more awake and responsive on tube feeding no more diarrhea ,  no nausea or vomiting or diarrhea still very weak and tired , back on tube feeding ,still on po vancomycin , getting PT and OT at bed side ,   , no SOB getting stronger , improve muscle tone patient transfer to monitor bed still on contact isolation for C-Difficel colitis on 50% FI02, and change to Suresh  tube feeding still loose stool . H/H drop significantly require blood transfusion , most likely GI bleeding , IV heparin D/C ,  H/H is stable ., patient develop TR sided  pneumothorax require chest tube placement , RT IJ central line  placed , develop fever shaking chills , blood culture positive for serratia marcescens , start on cefepime .the patient  become hypoxic and hypotensive placed on full ventilatory support and Vasopressin , levophed and Dobutamine ,S/P blood transfusion on meropenem and vancomycin ,   , on and  off pressors , occasional agitation on Precedex .S/P IR cholecystostomy tube drainage placement in the RT upper Quadrant , resume anticoagulation chest x ray noted C-PAP trail lasted only for 2 hrs , new RT SC line and D/C RT IJ line , RT pig tail cathter has been removed , tolerating C-PAP trial placed on trach. collar 50% FI02 GI consultant noted on NG tube feeding as tolerated , develop AF RVR S/P  bolus Amiodarone  back to regular sinus Rhythm , Flat Affect depressed , back on tube feeding Vital AF at 60 cc/ hr .still intermittent abdominal pain , no fever saturation is accepted  back on full ventilatory support ,   on methimazole for hyperthyroidism ,  condition is the same ,still C/O Abdominal pain , white count is improving , no chest pain or SOB ,  .placed on Reglan 10 mg TID for gastric motility , depressed , withdrawn. , S/P  mesenteric angiogram , unable to stent SMA S/P 3 units of blood transfusion   RT IJ in place reassessed for stent in the SMA by vascular,  dark stool stable H/H ,surgical opinion for possible Lap cholecystectomy. over night events noted develop respiratory distress, , rectal bleeding, require intubation placed on full ventilatory support , FI02 is 50% also became hemianosmically unstable require triple pressor support with levophed , vasopressin and norsynephrine, pan culture placed  on Vancomycin IV and PO  and Zosyn, sedated with propofol kept NPO , new central line RT and left IJ cathter placed . Diflucan Added .fever of 38.5 weaned off  vasopressin , all culture are negative, resume tube feeding ,  white count is improving , on meropenem,  and bactrim   fever adding IV vancomycin and  vancomycin solution  , and Bactrim , S/P  tracheostomy , bleeding receiving blood transfusion on vasopressin , no more bleeding , no fever , more awake and responsive ,tolerating tube feeding , ID and heart failure follow up appreciated , depresses no weaning for now , magnesium is low to give supplement too keep Above 2 , potassium is low to give supplement , patient S/P  vascular procedure for superior mesenteric artery occlusion S/P 2 stent placement , patient tolerate it very well       (2021 16:57)    PAST MEDICAL & SURGICAL HISTORY:  CHF (congestive heart failure)    CAD (coronary artery disease)  Depression    Pleural effusion    History of 2019 novel coronavirus disease (COVID-19)  2020    Hemorrhoids    Bleeding hemorrhoids    Peripheral arterial disease    Claudication    BPH with urinary obstruction    ACC/AHA stage D systolic heart failure  Anticoagulation goal of INR 2.0 to 2.5    Falls    Clavicle fracture    CKD (chronic kidney disease), stage III    Iron deficiency anemia    H/O epistaxis    Vertigo    GI bleed    S/P TVR (tricuspid valve repair)    S/P ventricular assist device    S/P endoscopy    OBJECTIVE:  ICU Vital Signs Last 24 Hrs  T(C): 38.2 (2021 10:00), Max: 38.5 (2021 12:00)  T(F): 100.8 (2021 10:00), Max: 101.3 (2021 12:00)  HR: 65 (2021 10:00) (61 - 69)  BP: --  BP(mean): --  ABP: 105/67 (2021 10:00) (90/54 - 113/64)  ABP(mean): 77 (2021 10:00) (63 - 77)  RR: 20 (2021 10:00) (19 - 35)  SpO2: 99% (2021 10:00) (96% - 100%)       @ 07:  -   @ 07:00  --------------------------------------------------------  IN: 2693.9 mL / OUT: 1415 mL / NET: 1278.9 mL     @ 07:01  -   @ 10:49  --------------------------------------------------------  IN: 420.8 mL / OUT: 115 mL / NET: 305.8 mL  PHYSICAL EXAM: Daily   Elderly male  on trach. collar  FI02 is 40% S/P tracheostomy   Daily Weight in k.4 (2021 00:00)  HEENT:     + NCAT  + EOMI  - Conjuctival edema   - Icterus   - Thrush   - ETT  + NGT/OGT  Neck:         + FROM  RT IJ , LT IJ  lines JVD  - Nodes - Masses + Mid-line trachea + Tracheostomy  Chest:            normal A-P diameter    Lungs:          + CTA   + Rhonchi    - Rales    - Wheezing + Decreased  LT BS   - Dullness R L  Cardiac:       + S1 + S2    + RRR   - Irregular   - S3  - S4    - Murmurs   - Rub   - Hamman’s sign   Abdomen:    + BS  + Soft + Non-tender - Distended - Organomegaly - PEG .cholecystostomy tube in place  Extremities:   - Cyanosis U/L   - Clubbing  U/L  + LE/UE Edema   + Capillary refill    + Pulses   Neuro:        - Awake   -  Alert   - Confused   - Lethargic   + Sedated  + Generalized Weakness  Skin:        - Rashes    - Erythema   + Normal incisions   + IV sites intact          HOSPITAL MEDICATIONS: All medications reviewed and analyzed  MEDICATIONS  (STANDING):  amiodarone    Tablet 200 milliGRAM(s) Oral daily  chlorhexidine 0.12% Liquid 15 milliLiter(s) Oral Mucosa every 12 hours  chlorhexidine 2% Cloths 1 Application(s) Topical <User Schedule>  dexmedetomidine Infusion 0.5 MICROgram(s)/kG/Hr (9.81 mL/Hr) IV Continuous <Continuous>  dextrose 50% Injectable 50 milliLiter(s) IV Push every 15 minutes  heparin  Infusion 400 Unit(s)/Hr (12.5 mL/Hr) IV Continuous <Continuous>  Hydromorphone  Injectable 0.5 milliGRAM(s) IV Push once  insulin lispro (ADMELOG) corrective regimen sliding scale   SubCutaneous every 6 hours  pantoprazole  Injectable 40 milliGRAM(s) IV Push every 12 hours  piperacillin/tazobactam IVPB.. 3.375 Gram(s) IV Intermittent every 8 hours  propofol Infusion 20 MICROgram(s)/kG/Min (9.42 mL/Hr) IV Continuous <Continuous>  sodium chloride 0.9% lock flush 3 milliLiter(s) IV Push every 8 hours  sodium chloride 0.9%. 1000 milliLiter(s) (10 mL/Hr) IV Continuous <Continuous>    MEDICATIONS  (PRN):  acetaminophen    Suspension .. 650 milliGRAM(s) Oral every 6 hours PRN Temp greater or equal to 38C (100.4F)    LABS: All Lab data reviewed and analyzed                         10.1   9.39  )-----------( 195      ( 19 Oct 2021 03:30 )             31.2    10-    129<L>  |  94<L>  |  5<L>  ----------------------------<  168<H>  4.1   |  23  |  0.55    Ca    9.2      19 Oct 2021 14:18  Phos  2.5     10-19  Mg     1.4     10-    TPro  7.0  /  Alb  3.7  /  TBili  0.7  /  DBili  x   /  AST  19  /  ALT  21  /  AlkPhos  155<H>  10-19    Ca    9.7      18 Oct 2021 01:03  Phos  3.0     10-18  Mg     1.5     10-18    TPro  7.6  /  Alb  3.9  /  TBili  0.8  /  DBili  x   /  AST  15  /  ALT  20  /  AlkPhos  153<H>  10-18    Ca    9.4      15 Oct 2021 13:21  Phos  2.1     10-15  Mg     1.7     10-15    TPro  7.3  /  Alb  4.0  /  TBili  1.5<H>  /  DBili  x   /  AST  24  /  ALT  33  /  AlkPhos  136<H>  10-15                                                                                                                                                                        PTT - ( 2021 04:52 )  PTT:45.2 sec LIVER FUNCTIONS - ( 2021 00:42 )  Alb: 3.4 g/dL / Pro: 6.7 g/dL / ALK PHOS: 213 U/L / ALT: 15 U/L / AST: 24 U/L / GGT: x           RADIOLOGY: - Reviewed and analyzed RT Pig tail cathter  , LVAD HM2, CT scan of abdomen reviewed result noted

## 2021-10-19 NOTE — CHART NOTE - NSCHARTNOTEFT_GEN_A_CORE
Surgery Post op Note    Procedure: L brachial cutdown, mesenteric angiogram, SMA stent x2 placed    SUBJECTIVE: Pt seen and examined at bedside several hours after surgery. Patient is sleepy but arousable and responsive.   SOB:  [ ] YES [ *] NO  Chest Discomfort: [ ] YES [* ] NO    Nausea: [ ] YES [* ] NO           Vomiting: [ ] YES [ *] NO  Flatus: [ ] YES [* ] NO             Bowel Movement: [ ] YES [ *] NO  Void: [ ]YES [* ]No         Pain Control Adequate: [* ] YES [ ] NO        Vital Signs Last 24 Hrs  T(C): 36.1 (18 Oct 2021 20:15), Max: 36.2 (18 Oct 2021 08:00)  T(F): 97 (18 Oct 2021 20:15), Max: 97.2 (18 Oct 2021 08:00)  HR: 60 (18 Oct 2021 23:) (60 - 106)  BP: --  BP(mean): --  RR: 12 (18 Oct 2021 23:) (12 - 32)  SpO2: 100% (18 Oct 2021 23:) (95% - 100%)  I&O's Summary    17 Oct 2021 07:  -  18 Oct 2021 07:00  --------------------------------------------------------  IN: 2520 mL / OUT: 2115 mL / NET: 405 mL    18 Oct 2021 07:  -  19 Oct 2021 00:41  --------------------------------------------------------  IN: 1137.7 mL / OUT: 1025 mL / NET: 112.7 mL      I&O's Detail    17 Oct 2021 07:  -  18 Oct 2021 07:00  --------------------------------------------------------  IN:    dextrose 5% + sodium chloride 0.45% w/ Additives: 720 mL    Enteral Tube Flush: 100 mL    IV PiggyBack: 1700 mL  Total IN: 2520 mL    OUT:    Drain (mL): 250 mL    Voided (mL): 1865 mL  Total OUT: 2115 mL    Total NET: 405 mL      18 Oct 2021 07:01  -  19 Oct 2021 00:41  --------------------------------------------------------  IN:    Albumin 5%  - 250 mL: 250 mL    Dexmedetomidine: 7.7 mL    dextrose 5% + sodium chloride 0.45% w/ Additives: 90 mL    dextrose 5% + sodium chloride 0.45% w/ Additives: 240 mL    IV PiggyBack: 550 mL  Total IN: 1137.7 mL    OUT:    Drain (mL): 150 mL    Miscellaneous Tube Feedin mL    Voided (mL): 875 mL  Total OUT: 1025 mL    Total NET: 112.7 mL          MEDICATIONS  (STANDING):  aspirin  chewable 81 milliGRAM(s) Oral daily  chlorhexidine 0.12% Liquid 5 milliLiter(s) Oral Mucosa two times a day  chlorhexidine 0.12% Liquid 15 milliLiter(s) Oral Mucosa every 12 hours  clopidogrel Tablet 75 milliGRAM(s) Oral daily  dexMEDEtomidine Infusion 0.5 MICROgram(s)/kG/Hr (7.7 mL/Hr) IV Continuous <Continuous>  dextrose 40% Gel 15 Gram(s) Oral once  dextrose 5% + sodium chloride 0.45% with potassium chloride 10 mEq/L 1000 milliLiter(s) (30 mL/Hr) IV Continuous <Continuous>  dextrose 50% Injectable 12.5 Gram(s) IV Push once  heparin   Injectable 5000 Unit(s) SubCutaneous every 8 hours  hydrocortisone sodium succinate Injectable 25 milliGRAM(s) IV Push daily  insulin lispro (ADMELOG) corrective regimen sliding scale   SubCutaneous every 6 hours  meperidine     Injectable 25 milliGRAM(s) IV Push once  methimazole 20 milliGRAM(s) Oral <User Schedule>  metoclopramide Injectable 10 milliGRAM(s) IV Push every 8 hours  mirtazapine 7.5 milliGRAM(s) Oral daily  multivitamin 1 Tablet(s) Oral daily  pantoprazole  Injectable 40 milliGRAM(s) IV Push every 12 hours  potassium chloride   Solution 20 milliEquivalent(s) Oral once  potassium chloride   Solution 20 milliEquivalent(s) Oral once  potassium chloride   Solution 20 milliEquivalent(s) Oral once  sertraline 100 milliGRAM(s) Oral daily  sodium chloride 0.9%. 1000 milliLiter(s) (10 mL/Hr) IV Continuous <Continuous>  thiamine 100 milliGRAM(s) Oral daily  trimethoprim / sulfamethoxazole IVPB 300 milliGRAM(s) IV Intermittent every 6 hours  vancomycin  IVPB 1000 milliGRAM(s) IV Intermittent every 12 hours    MEDICATIONS  (PRN):  ondansetron Injectable 4 milliGRAM(s) IV Push every 8 hours PRN Nausea and/or Vomiting  simethicone 80 milliGRAM(s) Chew every 8 hours PRN Gas      LABS:                        11.1   8.33  )-----------( 216      ( 18 Oct 2021 20:53 )             33.3     10-18    131<L>  |  94<L>  |  5<L>  ----------------------------<  95  3.4<L>   |  23  |  0.51    Ca    9.7      18 Oct 2021 20:53  Phos  2.8     10-18  Mg     1.6     10-18    TPro  7.9  /  Alb  4.0  /  TBili  0.9  /  DBili  x   /  AST  18  /  ALT  20  /  AlkPhos  165<H>  10-18    PT/INR - ( 18 Oct 2021 20:53 )   PT: 17.4 sec;   INR: 1.48 ratio         PTT - ( 18 Oct 2021 20:53 )  PTT:34.9 sec          Physical exam  General Appearance: Appears well, NAD  Respiratory: No labored breathing  CV: Pulse regularly present  Abdomen: Soft, nontender, nondistended  LUE: mild edema around the incision, dressing clean, +signal on radial and palmar arch, no signal on ulnar, left hand cooler than right, sensation and motion grossly equal on both sides.     Accessment:  65M PMH ACC/AHA stage D HF due to NICM HM2 LVAD , TV annuloplasty ring 17 as destination therapy due to severe peripheral artery disease with significant stenosis  SIADH, Depression, CKD-3 with hyperkalemia, past E. coli UTIs, driveline drainage (21) and COVID-19, here initially for GIB prolonged hospital course, s/p tracheostomy, acalculous cholecystitis s/p perc dawn. Patient has persistent intermittent abdominal pain, CTA showing possible proximal SMA stenosis. CTA poor quality limited by significant streak artifact. Mesenteric duplex velocities without evidence of significant stenosis, however study unreliable in the setting of patient's augmented systolic function given LVAD. Now s/p diagnostic mesenteric angiogram with unsuccessful SMA stenting.  CTA obtained due to downtrending H/H with evidence of L RP hematoma without evidence of active extravasation, L groin wound stable. H/H stable. Now s/p L brachial cutdown, mesenteric angiogram, SMA stent x2 placed on second intervention on 10/18/2021.    Plan:  -f/u GI function and abdominal symptoms.   -f/u vascular exam on LLE  -plavix load right after surgery and daily plavix then  -care per primary team      Vascular  [id number]

## 2021-10-19 NOTE — PROGRESS NOTE ADULT - ASSESSMENT
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 coli UTIs, driveline drainage (1/7/21) and COVID-19, here initially for GIB prolonged hospital course, s/p tracheostomy, acalculous cholecystitis s/p perc dawn. Patient has persistent intermittent abdominal pain, CTA showing possible proximal SMA stenosis. CTA poor quality limited by significant streak artifact. Mesenteric duplex velocities without evidence of significant stenosis, however study unreliable in the setting of patient's augmented systolic function given LVAD. Now s/p diagnostic mesenteric angiogram with unsuccessful SMA stenting. 09/12 CTA obtained due to downtrending H/H with evidence of L RP hematoma without evidence of active extravasation, L groin wound stable. H/H stable. Vascular re-consulted for potential attempt at second intervention given improved patient condition. S/p 10/18 mesenteric angiogram with x2 stents.     Plan:  - will follow  - Continue care per primary team  -     Vascular Surgery  [id number]   65M PMH ACC/AHA stage D HF due to NICM HM2 LVAD , TV annuloplasty ring 9/12/17 as destination therapy due to severe peripheral artery disease with significant stenosis  SIADH, Depression, CKD-3 with hyperkalemia, past E. coli UTIs, driveline drainage (1/7/21) and COVID-19, here initially for GIB prolonged hospital course, s/p tracheostomy, acalculous cholecystitis s/p perc dawn. Patient has persistent intermittent abdominal pain, CTA showing possible proximal SMA stenosis. CTA poor quality limited by significant streak artifact. Mesenteric duplex velocities without evidence of significant stenosis, however study unreliable in the setting of patient's augmented systolic function given LVAD. Now s/p diagnostic mesenteric angiogram with unsuccessful SMA stenting. 09/12 CTA obtained due to downtrending H/H with evidence of L RP hematoma without evidence of active extravasation, L groin wound stable. H/H stable. Vascular re-consulted for potential attempt at second intervention given improved patient condition. S/p 10/18 mesenteric angiogram with x2 stents.     Plan:  - exam stable from preop  - will follow  - Continue care per primary team    Vascular Surgery  [id number]

## 2021-10-19 NOTE — PROGRESS NOTE ADULT - SUBJECTIVE AND OBJECTIVE BOX
INFECTIOUS DISEASES FOLLOW UP-- Anitra Prater  [phone]    This is a follow up note for this  66yMale with  Anemia    Acute cholecystitis        ROS:  CONSTITUTIONAL:  No fever, good appetite  CARDIOVASCULAR:  No chest pain or palpitations  RESPIRATORY:  No dyspnea  GASTROINTESTINAL:  No nausea, vomiting, diarrhea, or abdominal pain  GENITOURINARY:  No dysuria  NEUROLOGIC:  No headache,     Allergies    Amiodarone Hydrochloride (Other (Severe))    Intolerances        ANTIBIOTICS/RELEVANT:  antimicrobials  trimethoprim / sulfamethoxazole IVPB 300 milliGRAM(s) IV Intermittent every 6 hours  vancomycin  IVPB 1000 milliGRAM(s) IV Intermittent every 12 hours    immunologic:    OTHER:  aspirin  chewable 81 milliGRAM(s) Oral daily  chlorhexidine 0.12% Liquid 5 milliLiter(s) Oral Mucosa two times a day  chlorhexidine 0.12% Liquid 15 milliLiter(s) Oral Mucosa every 12 hours  clopidogrel Tablet 75 milliGRAM(s) Oral daily  dexMEDEtomidine Infusion 0.5 MICROgram(s)/kG/Hr IV Continuous <Continuous>  dextrose 40% Gel 15 Gram(s) Oral once  dextrose 5% + sodium chloride 0.45% with potassium chloride 10 mEq/L 1000 milliLiter(s) IV Continuous <Continuous>  dextrose 50% Injectable 12.5 Gram(s) IV Push once  heparin   Injectable 5000 Unit(s) SubCutaneous every 8 hours  insulin lispro (ADMELOG) corrective regimen sliding scale   SubCutaneous every 6 hours  meperidine     Injectable 25 milliGRAM(s) IV Push once  methimazole 20 milliGRAM(s) Oral <User Schedule>  metoclopramide Injectable 10 milliGRAM(s) IV Push every 8 hours  mirtazapine 7.5 milliGRAM(s) Oral daily  multivitamin 1 Tablet(s) Oral daily  ondansetron Injectable 4 milliGRAM(s) IV Push every 8 hours PRN  pantoprazole  Injectable 40 milliGRAM(s) IV Push every 12 hours  predniSONE   Tablet 20 milliGRAM(s) Oral every 24 hours  sertraline 100 milliGRAM(s) Oral daily  simethicone 80 milliGRAM(s) Chew every 8 hours PRN  sodium chloride 0.9%. 1000 milliLiter(s) IV Continuous <Continuous>  thiamine 100 milliGRAM(s) Oral daily      Objective:  Vital Signs Last 24 Hrs  T(C): 36.5 (19 Oct 2021 16:00), Max: 36.6 (19 Oct 2021 08:00)  T(F): 97.7 (19 Oct 2021 16:00), Max: 97.9 (19 Oct 2021 08:00)  HR: 90 (19 Oct 2021 17:00) (60 - 107)  BP: --  BP(mean): --  RR: 15 (19 Oct 2021 17:00) (8 - 28)  SpO2: 100% (19 Oct 2021 17:00) (98% - 100%)    PHYSICAL EXAM:  Constitutional:no acute distress  Eyes:ALONSO, EOMI  Ear/Nose/Throat: no oral lesions, 	  Respiratory: clear BL  Cardiovascular: S1S2  Gastrointestinal:soft, (+) BS, no tenderness  Extremities:no e/e/c  No Lymphadenopathy  IV sites not inflammed.    LABS:                        10.1   9.39  )-----------( 195      ( 19 Oct 2021 03:30 )             31.2     10-19    129<L>  |  94<L>  |  5<L>  ----------------------------<  168<H>  4.1   |  23  |  0.55    Ca    9.2      19 Oct 2021 14:18  Phos  2.5     10-19  Mg     1.4     10-19    TPro  7.0  /  Alb  3.7  /  TBili  0.7  /  DBili  x   /  AST  19  /  ALT  21  /  AlkPhos  155<H>  10-19    PT/INR - ( 18 Oct 2021 20:53 )   PT: 17.4 sec;   INR: 1.48 ratio         PTT - ( 18 Oct 2021 20:53 )  PTT:34.9 sec      MICROBIOLOGY:            RECENT CULTURES:  10-17 @ 21:54  .Blood Blood-Peripheral  --  --  --    No growth to date.  --  10-14 @ 09:38  .Sputum Sputum  Stenotrophomonas maltophilia  Stenotrophomonas maltophilia  CLAU    Moderate Stenotrophomonas maltophilia  Normal Respiratory Bria absent  --  10-14 @ 05:45  .Blood Blood  Serratia marcescens  Serratia marcescens  CLAU    Growth in anaerobic bottle: Serratia marcescens  --      RADIOLOGY & ADDITIONAL STUDIES:  < from: Xray Chest 1 View- PORTABLE-Routine (10.19.21 @ 01:39) >    IMPRESSION:  Clear lungs. No focal consolidations.    < end of copied text >

## 2021-10-19 NOTE — PROGRESS NOTE ADULT - SUPERVISING ATTENDING
CBC from May 2021 reviewed and stable/improved  Continue ferrous sulfate daily  Recheck CBC Sept 2021 Dr Cadet

## 2021-10-19 NOTE — PROGRESS NOTE ADULT - ASSESSMENT
65 YO M with a history of stage D NICM s/p HM2 on 9/2017 as DT (due to severe PAD) with TV ring, prior COVID-19 infection 4/2020, recurrent syncope post LVAD s/p ILR, and chronic abdominal pain with prior negative workup who was admitted 6/14/21 with symptomatic anemia with Hgb 4.5 in setting of INR 8.8 without hemodynamic instability and has since had a protracted hospitalization. He was transfused and underwent VCE which showed active bleeding in the mid small bowel but subsequent enteroscopy 6/15 did not reveal any active bleeding. He acutely decompensated after procedure with fever/hypertension, low flow alarms, and pulmonary infiltrate with hypoxia requiring intubation from probable aspiration PNA. He was unable to extubated and has since undergone tracheostomy but tolerating persistent trach collar and nearing ability for decannulation. 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 He underwent angiogram on 9/10, stent was unable to be placed and course was then complicated by RP bleed. He continued to have persistent leukocytosis and febrile episodes and was noted to have positive sputum culture for serratia marcescens and completed his course of abx. He was initially decannulated on 9/23. Recently he started having multiples of melena, emesis and went into acte respiratory distress and was ultimately re-intubated on 9/26.     He had blood cultures are positive for enterobacter cloacae/serratia, remains on IV antibiotics. He is s/p trach with ENT on 10/4. Sputum cultures positive for stenotrophomonas and blood cultures positive for serratia on bactrim. S/p SMA stent x 2 10/18.

## 2021-10-19 NOTE — PROGRESS NOTE ADULT - PROBLEM SELECTOR PLAN 2
- Stable w/o evidence of LVAD malfunction, settings as above  - Unable to tolerate AC or ECASA due to recurrent GI bleeding  - Trend daily LDH, currently stable. - Stable w/o evidence of LVAD malfunction, settings as above  - H/o intolerability to AC or ECASA due to recurrent GI bleeding  - Trend daily LDH, currently stable.

## 2021-10-19 NOTE — PROGRESS NOTE ADULT - PROBLEM SELECTOR PLAN 2
- Stable w/o evidence of LVAD malfunction, settings as above  - H/o intolerability to AC or ECASA due to recurrent GI bleeding  - Trend daily LDH, currently stable.

## 2021-10-19 NOTE — PROGRESS NOTE ADULT - SUBJECTIVE AND OBJECTIVE BOX
HEART FAILURE FELLOW PROGRESS NOTE    Subjective:    S/p SMA stent x 2 through L brachial cutdown.     ROS + abdominal pain.     Current Medications:   aspirin  chewable 81 milliGRAM(s) Oral daily  chlorhexidine 0.12% Liquid 5 milliLiter(s) Oral Mucosa two times a day  chlorhexidine 0.12% Liquid 15 milliLiter(s) Oral Mucosa every 12 hours  clopidogrel Tablet 75 milliGRAM(s) Oral daily  dexMEDEtomidine Infusion 0.5 MICROgram(s)/kG/Hr IV Continuous <Continuous>  dextrose 40% Gel 15 Gram(s) Oral once  dextrose 5% + sodium chloride 0.45% with potassium chloride 10 mEq/L 1000 milliLiter(s) IV Continuous <Continuous>  dextrose 50% Injectable 12.5 Gram(s) IV Push once  heparin   Injectable 5000 Unit(s) SubCutaneous every 8 hours  insulin lispro (ADMELOG) corrective regimen sliding scale   SubCutaneous every 6 hours  meperidine     Injectable 25 milliGRAM(s) IV Push once  methimazole 20 milliGRAM(s) Oral <User Schedule>  metoclopramide Injectable 10 milliGRAM(s) IV Push every 8 hours  mirtazapine 7.5 milliGRAM(s) Oral daily  multivitamin 1 Tablet(s) Oral daily  ondansetron Injectable 4 milliGRAM(s) IV Push every 8 hours PRN  pantoprazole  Injectable 40 milliGRAM(s) IV Push every 12 hours  predniSONE   Tablet 20 milliGRAM(s) Oral every 24 hours  sertraline 100 milliGRAM(s) Oral daily  simethicone 80 milliGRAM(s) Chew every 8 hours PRN  sodium chloride 0.9%. 1000 milliLiter(s) IV Continuous <Continuous>  thiamine 100 milliGRAM(s) Oral daily  trimethoprim / sulfamethoxazole IVPB 300 milliGRAM(s) IV Intermittent every 6 hours  vancomycin  IVPB 1000 milliGRAM(s) IV Intermittent every 12 hours    Physical Exam:  T(F): 97.9 (10-19), Max: 97.9 (10-19)  HR: 82 (10-19) (60 - 106)  BP: --  BP(mean): --  ABP: 93/64 (10-19) (78/71 - 126/85)  ABP(mean): 77 (10-19) (70 - 104)  RR: 8 (10-19)  SpO2: 99% (10-19)  GENERAL: No acute distress, well-developed  HEAD:  Atraumatic, Normocephalic  ENT: EOMI, PERRLA, conjunctiva and sclera clear, Neck supple, No JVD, moist mucosa. NGT.  CHEST/LUNG: Clear to auscultation bilaterally; No wheeze, equal breath sounds bilaterally   BACK: No spinal tenderness  HEART: LVAD hum  ABDOMEN: Soft, Nontender, Nondistended; Bowel sounds present. Per dawn drain. Driveline site c/d/i.   EXTREMITIES:  No clubbing, cyanosis, or edema. LUE access site w/o hematoma but with bleeding on dressing.   PSYCH: Nl behavior, nl affect  NEUROLOGY: AAOx3, non-focal, cranial nerves intact  SKIN: Normal color, No rashes or lesions    LVAD Interrogation  VAD TYPE HM 2  Speed 9200  Flow 5.0  Power 5.5  PI 6.3  Assessment of driveline exit site: driveline exit site c/d/i  Programming changes: no changes made    Cardiovascular Diagnostic Testing: personally reviewed    CXR: Personally reviewed    Labs: Personally reviewed                        10.1   9.39  )-----------( 195      ( 19 Oct 2021 03:30 )             31.2     10-19    129<L>  |  96  |  6<L>  ----------------------------<  90  4.8   |  23  |  0.55    Ca    9.6      19 Oct 2021 03:30  Phos  2.6     10-19  Mg     1.6     10-19    TPro  7.6  /  Alb  4.0  /  TBili  0.8  /  DBili  x   /  AST  19  /  ALT  20  /  AlkPhos  152<H>  10-19    PT/INR - ( 18 Oct 2021 20:53 )   PT: 17.4 sec;   INR: 1.48 ratio         PTT - ( 18 Oct 2021 20:53 )  PTT:34.9 sec

## 2021-10-19 NOTE — PROGRESS NOTE ADULT - SUBJECTIVE AND OBJECTIVE BOX
RICKY HAMPTON  MRN-[id number]  Patient is a 66y old  Male who presents with a chief complaint of Anemia, Supratherapeutic INR, Dark Stools (18 Oct 2021 17:14)    HPI:  65M PMH ACC/AHA stage D HF due to NICM HM2 LVAD , TV annuloplasty ring 17 as destination therapy due to severe peripheral artery disease with significant stenosis  SIADH, Depression, CKD-3 with hyperkalemia, past E. coli UTIs, driveline drainage (21) and COVID-19 (back in 2020)  He was recently seen in clinic where he complained of abdominal pain and dark stools w constipation back in May. He presents to [facility] ER today weakness and fatigue, moderate and + Black stools for three days, on coumadin secondary to warfarin use in the setting of an LVAD. Patient has required transfusions for GIB in the past. Mostly recently back in 2021 pt had anemia with dark stools. No interventions was done at that time. However Last Endoscopy was done in 2020 (negative). Today labs show patient is anemic with H/H of 4.5/16.3,. INR is 8.84 MAP in the 90s, Temp 35.1. He denies any chest pain, shortness of breath, dizziness, abd pain, nausea or vomiting.       (2021 16:57)      Surgery/Hospital Course:   admit for melena w/ anemia, INR 8.84   6/15 Capsul study (+) for small bowel bleed, balloon endoscopy (old blood in prox ileum); post EGD - septic w/ L opacity, re-intubated for concern for aspiration, TTE (Mod MR, decrease biV w/ interventricular septum boweing towards R)   bronch    +C Diff    CT C/A/P: Fluid filled colon which may be 2/2 rapid transit. Small bilateral pleffs with associates. Compressive atelectasis New ISABELLE & LLL  parenchymal opacities, suspicious for pneumonia. Moderate stenosis in the proximal superior mesenteric artery.    #8 Shiley trach at bedside    LVAD speed increased to 9200   Bronch   TC since . Patient transferred to SDU.    INR today 2.64.  H&H 7.3/24 this AM.  Will repeat CBC at noon, and will send stool guaiac Patient with persistent abdominal tenderness, rate of tube feeds decreased.  No nausea/vomiting.     INR today 2.4. H&H 9.1/28.6 low flow overnight /N&V, refusing Tube feeds on D5 1/2 normal  @50 cc/hr   INR 2.69  H&H 7.7/.1 refusing Tube feeds on D5 1/2 normal  @50 cc/hr. This am + BM Melena Dr Oneill HF  aware- PRBC x1  GI team consulted -  NPO  plan on study in am-  D/w Dr Cadet Patient  to return to CTU for further management; 1PRBCS    Post op INR 2.2 today.  No bleeding. BC + for SM.  Pt is hypotensive requiring pressor and inotropic support.  ID follow up today on Cefepime will follow.   R PTC for PTX    CT C/A/P: sub q emphysema in R chest wall, GGO RUL, small ascites CTH negative; Abd US: GB thickening, pericholecystic fluid     Perchole drain in place continues to drain total output overnight 133.  Fever today 38.8    duplex LE negative    Patient with persistent abdominal pain, refusing tube feeds and medications, Psych consulted   CTA A/P ordered to r/o mesenteric ischemia 2/2 persistent anorexia, nausea, vomiting. Revealed:  Evaluation of the mesenteric vessels is limited by streak artifact from LVAD. There appears to be severe stenosis of the proximal SMA; abdominal mesenteric doppler is recommended for further evaluation. 2.  No small bowel findings to suggest acute mesenteric ischemia. 3.  Focal dissections involving the right and left common iliac arteries.  8/15: Cultures resulted BC 1/2 +GPC in clusters, SC enterobacter; mesenteric duplex: borderline stenosis of proximal SMA  : CT C/A/P noncon: Nondular opacities in R lung apex w/cavitation, abd nl  :  Continue current care, treatment of thyrotoxicosis with medications as per endocrine, d/c ABX as per team.    RUQ sono: Contracted gallbladder with cholecystostomy tube in place.  9/10 failed SMA stent, c/b RP bleed s/p 3U PRCB   CTA Abd/Pelvis L RP hematoma    Trach decannulated    intubated fro resp distress for increased WOB, LL pneumonia; CT C/A/P: progressive ISABELLE opacities and L consolidations, multifocal pneumonia    TTE (EF 25%, decreased RV, mod MR)   10/3 VT -> Lidocaine gtt   10/4 Trach size 6 DCT cuff  10/5 CT abd (neg for intra-abd abcess, severe stenosis of prox SMA and b/l iliac arteries)  10/18 SMA Stent     Today:    REVIEW OF SYSTEMS:  Gen: No fever  EYES/ENT: No visual changes;  No vertigo or throat pain   NECK: No pain   RES:  No shortness of breath or Cough  CARD: No chest pain   GI: +intermittently c/o abd pain and nausea   : No dysuria  NEURO: No weakness  SKIN: No itching, rashes    ICU Vital Signs Last 24 Hrs  T(C): 36.1 (19 Oct 2021 00:00), Max: 36.2 (18 Oct 2021 08:00)  T(F): 97 (19 Oct 2021 00:00), Max: 97.2 (18 Oct 2021 08:00)  HR: 80 (19 Oct 2021 06:00) (60 - 106)  BP: --  BP(mean): --  ABP: 84/71 (19 Oct 2021 04:00) (78/71 - 126/85)  ABP(mean): 77 (19 Oct 2021 04:00) (70 - 104)  RR: 8 (19 Oct 2021 04:00) (8 - 32)  SpO2: 100% (19 Oct 2021 06:00) (95% - 100%)      Physical Exam:  Gen:  NAD  CNS: moves all extremities to command   Neck: no JVD, +trach, +central line   RES : coarse breath sounds, no wheezing    CVS: +LVAD hum   Abd: Soft. Sybil drain with bilious fluid. Positive BS throughout. Mild RUQ abdominal tenderness by perc sybil.  Skin: No rash, erythema, cyanosis.  Vasc: Warm and well-perfused.  Ext:  no edema    ============================I/O===========================   I&O's Detail    18 Oct 2021 07:01  -  19 Oct 2021 07:00  --------------------------------------------------------  IN:    Albumin 5%  - 250 mL: 250 mL    Dexmedetomidine: 7.7 mL    dextrose 5% + sodium chloride 0.45% w/ Additives: 240 mL    dextrose 5% + sodium chloride 0.45% w/ Additives: 240 mL    IV PiggyBack: 850 mL  Total IN: 1587.7 mL    OUT:    Drain (mL): 190 mL    Miscellaneous Tube Feedin mL    Voided (mL): 875 mL  Total OUT: 1065 mL    Total NET: 522.7 mL        ============================ LABS =========================                        10.1   9.39  )-----------( 195      ( 19 Oct 2021 03:30 )             31.2     10-19    129<L>  |  96  |  6<L>  ----------------------------<  90  4.8   |  23  |  0.55    Ca    9.6      19 Oct 2021 03:30  Phos  2.6     10-  Mg     1.6     10-    TPro  7.6  /  Alb  4.0  /  TBili  0.8  /  DBili  x   /  AST  19  /  ALT  20  /  AlkPhos  152<H>  10-19    LIVER FUNCTIONS - ( 19 Oct 2021 03:30 )  Alb: 4.0 g/dL / Pro: 7.6 g/dL / ALK PHOS: 152 U/L / ALT: 20 U/L / AST: 19 U/L / GGT: x           PT/INR - ( 18 Oct 2021 20:53 )   PT: 17.4 sec;   INR: 1.48 ratio         PTT - ( 18 Oct 2021 20:53 )  PTT:34.9 sec  ABG - ( 19 Oct 2021 03:19 )  pH, Arterial: 7.41  pH, Blood: x     /  pCO2: 43    /  pO2: 200   / HCO3: 27    / Base Excess: 2.3   /  SaO2: 100.0               ======================Micro/Rad/Cardio=================  Culture: Reviewed   CXR: Reviewed  Echo:Reviewed  ======================================================  PAST MEDICAL & SURGICAL HISTORY:  CHF (congestive heart failure)    CAD (coronary artery disease)    Depression    Pleural effusion    History of  novel coronavirus disease (COVID-19)  2020    Hemorrhoids    Bleeding hemorrhoids    Peripheral arterial disease    Claudication    BPH with urinary obstruction    ACC/AHA stage D systolic heart failure    Anticoagulation goal of INR 2.0 to 2.5    Falls    Clavicle fracture    CKD (chronic kidney disease), stage III    Iron deficiency anemia    H/O epistaxis    Vertigo    GI bleed    S/P TVR (tricuspid valve repair)    S/P ventricular assist device    S/P endoscopy      ====================ASSESSMENT ==============  Stage D Nonischemic Cardiomyopathy, Status Post HM2 on 2017    Cardiogenic shock  Hemodynamic instability   Acute hypoxemic respiratory failure s/p trach , decannulated on ; Reintubated on    GI bleed , Status Post Enteroscopy   Anemia, in setting of melena   Chronic Kidney Disease  Stress hyperglycemia   C.diff positive on    Hypovolemic shock  Septic shock  Leukocytosis  GB thickening/percholecystic s/p perc sybil by IR   SMA stenosis  Serratia/citrobacter pneumonia   Stenotrophomonas pneumonia   Enterobacter pneumonia   Nasuea/vomiting  Deconditioning     Plan:  ====================== NEUROLOGY=====================  Patient is sedated with IV Precedex     dexMEDEtomidine Infusion 0.5 MICROgram(s)/kG/Hr (7.7 mL/Hr) IV Continuous <Continuous>  meperidine     Injectable 25 milliGRAM(s) IV Push once  mirtazapine 7.5 milliGRAM(s) Oral daily  sertraline 100 milliGRAM(s) Oral daily    ==================== RESPIRATORY======================  Acute hypoxemic respiratory failure s/p #8 shiley trach on ; decannulated on ; Reintubated on ; trach size 6 DCT cuff on 10/4   Continue close monitoring of respiratory rate and breathing pattern, following of ABG’s with A-line monitoring, continuous pulse oximetry monitoring.   C/w TC as tolerated     Mechanical Ventilation:  Mode: pt on Trach collar    ====================CARDIOVASCULAR==================  Stage D Nonischemic Cardiomyopathy, Status Post HM2 on 2017; LVAD settings 9200, flow 4.6  TTE 10/4:  Severely decreased LV systolic function, Diffuse hypokinesis. Mild AR. No pericardial effusion   VT on 10/4, s/p Lidocaine drip, continue close tele monitoring   ASA for recent stent   Clopidogrel for peripheral artery disease     aspirin  chewable 81 milliGRAM(s) Oral daily  clopidogrel Tablet 75 milliGRAM(s) Oral daily    ===================HEMATOLOGIC/ONC ===================  CTA A/P on  +L RP hematoma   Acute blood loss anemia, monitor H&H/Plts   Holding coumadin given recurrent GI bleeding, continue monitoring LDH     VTE prophylaxis, heparin   Injectable 5000 Unit(s) SubCutaneous every 8 hours    ===================== RENAL =========================  Hx of CKD, continue monitoring urine output, I&OS, BUN/Cr   Replete lytes PRN. Keep K> 4 and Mg >2    ==================== GASTROINTESTINAL===================  TFs held in setting of ongoing nausea / recent emesis   CT A/P 10/5 neg for intra-abd abcess, severe stenosis of prox SMA and b/l iliac arteries  CTA A/P : Evaluation of the mesenteric vessels is limited by streak artifact from LVAD. There appears to be severe stenosis of the proximal SMA; abdominal mesenteric doppler is recommended for further evaluation. 2.  No small bowel findings to suggest acute mesenteric ischemia. 3.  Focal dissections involving the right and left common iliac arteries.  Stenosis of proximal SMA, s/p failed SMA stent c/b RP bleed on 9/10 - SMA stent with Vascular 10/18   Simethicone PRN for gas   Reglan for gut motility  Zofran for nausea       dextrose 5% + sodium chloride 0.45% with potassium chloride 10 mEq/L 1000 milliLiter(s) (30 mL/Hr) IV Continuous <Continuous>  multivitamin 1 Tablet(s) Oral daily  GI prophylaxis, pantoprazole  Injectable 40 milliGRAM(s) IV Push every 12 hours  simethicone 80 milliGRAM(s) Chew every 8 hours PRN Gas  sodium chloride 0.9%. 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   CT C/A/P : progressive ISABELLE opacities and L consolidations, multifocal pneumonia  BCx 1/2 on 10/1 +Enterobacter cloacae complex, BCx  10/14+ Serratia marcescens   SCX on 10/14 +Stenotrophomonas maltophilia, c/w Bactrim and Vancomycin   Vancomycin solution for C. diff prophylaxis    trimethoprim / sulfamethoxazole IVPB 300 milliGRAM(s) IV Intermittent every 6 hours  vancomycin  IVPB 1000 milliGRAM(s) IV Intermittent every 12 hours      Patient requires continuous monitoring with bedside rhythm monitoring, pulse ox monitoring, and intermittent blood gas analysis. Care plan discussed with ICU care team. Patient remained critical and at risk for life threatening decompensation.     By signing my name below, I, Aparna Saleh, attest that this documentation has been prepared under the direction and in the presence of CLAUDIA Alfaro   Electronically signed: Cesia Villegas, 10-19-21 @ 07:16    I, Bertrand Turk, personally performed the services described in this documentation. all medical record entries made by the luisibe were at my direction and in my presence. I have reviewed the chart and agree that the record reflects my personal performance and is accurate and complete  Electronically signed: CLAUDIA Alfaro        RICKY HAMPTON  MRN-[id number]  Patient is a 66y old  Male who presents with a chief complaint of Anemia, Supratherapeutic INR, Dark Stools (18 Oct 2021 17:14)    HPI:  65M PMH ACC/AHA stage D HF due to NICM HM2 LVAD , TV annuloplasty ring 17 as destination therapy due to severe peripheral artery disease with significant stenosis  SIADH, Depression, CKD-3 with hyperkalemia, past E. coli UTIs, driveline drainage (21) and COVID-19 (back in 2020)  He was recently seen in clinic where he complained of abdominal pain and dark stools w constipation back in May. He presents to [facility] ER today weakness and fatigue, moderate and + Black stools for three days, on coumadin secondary to warfarin use in the setting of an LVAD. Patient has required transfusions for GIB in the past. Mostly recently back in 2021 pt had anemia with dark stools. No interventions was done at that time. However Last Endoscopy was done in 2020 (negative). Today labs show patient is anemic with H/H of 4.5/16.3,. INR is 8.84 MAP in the 90s, Temp 35.1. He denies any chest pain, shortness of breath, dizziness, abd pain, nausea or vomiting.       (2021 16:57)      Surgery/Hospital Course:   admit for melena w/ anemia, INR 8.84   6/15 Capsul study (+) for small bowel bleed, balloon endoscopy (old blood in prox ileum); post EGD - septic w/ L opacity, re-intubated for concern for aspiration, TTE (Mod MR, decrease biV w/ interventricular septum boweing towards R)   bronch    +C Diff    CT C/A/P: Fluid filled colon which may be 2/2 rapid transit. Small bilateral pleffs with associates. Compressive atelectasis New ISABELLE & LLL  parenchymal opacities, suspicious for pneumonia. Moderate stenosis in the proximal superior mesenteric artery.    #8 Shiley trach at bedside    LVAD speed increased to 9200   Bronch   TC since . Patient transferred to SDU.    INR today 2.64.  H&H 7.3/24 this AM.  Will repeat CBC at noon, and will send stool guaiac Patient with persistent abdominal tenderness, rate of tube feeds decreased.  No nausea/vomiting.     INR today 2.4. H&H 9.1/28.6 low flow overnight /N&V, refusing Tube feeds on D5 1/2 normal  @50 cc/hr   INR 2.69  H&H 7.7/.1 refusing Tube feeds on D5 1/2 normal  @50 cc/hr. This am + BM Melena Dr Oneill HF  aware- PRBC x1  GI team consulted -  NPO  plan on study in am-  D/w Dr Cadet Patient  to return to CTU for further management; 1PRBCS    Post op INR 2.2 today.  No bleeding. BC + for SM.  Pt is hypotensive requiring pressor and inotropic support.  ID follow up today on Cefepime will follow.   R PTC for PTX    CT C/A/P: sub q emphysema in R chest wall, GGO RUL, small ascites CTH negative; Abd US: GB thickening, pericholecystic fluid     Perchole drain in place continues to drain total output overnight 133.  Fever today 38.8    duplex LE negative    Patient with persistent abdominal pain, refusing tube feeds and medications, Psych consulted   CTA A/P ordered to r/o mesenteric ischemia 2/2 persistent anorexia, nausea, vomiting. Revealed:  Evaluation of the mesenteric vessels is limited by streak artifact from LVAD. There appears to be severe stenosis of the proximal SMA; abdominal mesenteric doppler is recommended for further evaluation. 2.  No small bowel findings to suggest acute mesenteric ischemia. 3.  Focal dissections involving the right and left common iliac arteries.  8/15: Cultures resulted BC 1/2 +GPC in clusters, SC enterobacter; mesenteric duplex: borderline stenosis of proximal SMA  : CT C/A/P noncon: Nondular opacities in R lung apex w/cavitation, abd nl  :  Continue current care, treatment of thyrotoxicosis with medications as per endocrine, d/c ABX as per team.    RUQ sono: Contracted gallbladder with cholecystostomy tube in place.  9/10 failed SMA stent, c/b RP bleed s/p 3U PRCB   CTA Abd/Pelvis L RP hematoma    Trach decannulated    intubated fro resp distress for increased WOB, LL pneumonia; CT C/A/P: progressive ISABELLE opacities and L consolidations, multifocal pneumonia    TTE (EF 25%, decreased RV, mod MR)   10/3 VT -> Lidocaine gtt   10/4 Trach size 6 DCT cuff  10/5 CT abd (neg for intra-abd abcess, severe stenosis of prox SMA and b/l iliac arteries)  10/18 SMA Stent     Today:  - Continue to aggressively mobilize the patient   - Patient is OOBTC   - Continue to ambulate as tolerated   - Plan to DC heparin today     REVIEW OF SYSTEMS:  Gen: No fever  EYES/ENT: No visual changes;  No vertigo or throat pain   NECK: No pain   RES:  No shortness of breath or Cough  CARD: No chest pain   GI: +intermittently c/o abd pain and nausea   : No dysuria  NEURO: No weakness  SKIN: No itching, rashes    ICU Vital Signs Last 24 Hrs  T(C): 36.1 (19 Oct 2021 00:00), Max: 36.2 (18 Oct 2021 08:00)  T(F): 97 (19 Oct 2021 00:00), Max: 97.2 (18 Oct 2021 08:00)  HR: 80 (19 Oct 2021 06:00) (60 - 106)  BP: --  BP(mean): --  ABP: 84/71 (19 Oct 2021 04:00) (78/71 - 126/85)  ABP(mean): 77 (19 Oct 2021 04:00) (70 - 104)  RR: 8 (19 Oct 2021 04:00) (8 - 32)  SpO2: 100% (19 Oct 2021 06:00) (95% - 100%)      Physical Exam:  Gen:  NAD  CNS: moves all extremities to command   Neck: no JVD, +trach, +central line   RES : coarse breath sounds, no wheezing    CVS: +LVAD hum   Abd: Soft. Sybil drain with bilious fluid. Positive BS throughout. Mild RUQ abdominal tenderness by perc sybil.  Skin: No rash, erythema, cyanosis.  Vasc: Warm and well-perfused.  Ext:  no edema    ============================I/O===========================   I&O's Detail    18 Oct 2021 07:01  -  19 Oct 2021 07:00  --------------------------------------------------------  IN:    Albumin 5%  - 250 mL: 250 mL    Dexmedetomidine: 7.7 mL    dextrose 5% + sodium chloride 0.45% w/ Additives: 240 mL    dextrose 5% + sodium chloride 0.45% w/ Additives: 240 mL    IV PiggyBack: 850 mL  Total IN: 1587.7 mL    OUT:    Drain (mL): 190 mL    Miscellaneous Tube Feedin mL    Voided (mL): 875 mL  Total OUT: 1065 mL    Total NET: 522.7 mL        ============================ LABS =========================                        10.1   9.39  )-----------( 195      ( 19 Oct 2021 03:30 )             31.2     10-    129<L>  |  96  |  6<L>  ----------------------------<  90  4.8   |  23  |  0.55    Ca    9.6      19 Oct 2021 03:30  Phos  2.6     10-19  Mg     1.6     10-19    TPro  7.6  /  Alb  4.0  /  TBili  0.8  /  DBili  x   /  AST  19  /  ALT  20  /  AlkPhos  152<H>  10-19    LIVER FUNCTIONS - ( 19 Oct 2021 03:30 )  Alb: 4.0 g/dL / Pro: 7.6 g/dL / ALK PHOS: 152 U/L / ALT: 20 U/L / AST: 19 U/L / GGT: x           PT/INR - ( 18 Oct 2021 20:53 )   PT: 17.4 sec;   INR: 1.48 ratio         PTT - ( 18 Oct 2021 20:53 )  PTT:34.9 sec  ABG - ( 19 Oct 2021 03:19 )  pH, Arterial: 7.41  pH, Blood: x     /  pCO2: 43    /  pO2: 200   / HCO3: 27    / Base Excess: 2.3   /  SaO2: 100.0               ======================Micro/Rad/Cardio=================  Culture: Reviewed   CXR: Reviewed  Echo:Reviewed  ======================================================  PAST MEDICAL & SURGICAL HISTORY:  CHF (congestive heart failure)    CAD (coronary artery disease)    Depression    Pleural effusion    History of 2019 novel coronavirus disease (COVID-19)  2020    Hemorrhoids    Bleeding hemorrhoids    Peripheral arterial disease    Claudication    BPH with urinary obstruction    ACC/AHA stage D systolic heart failure    Anticoagulation goal of INR 2.0 to 2.5    Falls    Clavicle fracture    CKD (chronic kidney disease), stage III    Iron deficiency anemia    H/O epistaxis    Vertigo    GI bleed    S/P TVR (tricuspid valve repair)    S/P ventricular assist device    S/P endoscopy      ====================ASSESSMENT ==============  Stage D Nonischemic Cardiomyopathy, Status Post HM2 on 2017    Cardiogenic shock  Hemodynamic instability   Acute hypoxemic respiratory failure s/p trach , decannulated on ; Reintubated on    GI bleed , Status Post Enteroscopy   Anemia, in setting of melena   Chronic Kidney Disease  Stress hyperglycemia   C.diff positive on    Hypovolemic shock  Septic shock  Leukocytosis  GB thickening/percholecystic s/p perc sybil by IR   SMA stenosis  Serratia/citrobacter pneumonia   Stenotrophomonas pneumonia   Enterobacter pneumonia   Nasuea/vomiting  Deconditioning     Plan:  ====================== NEUROLOGY=====================  Patient is sedated with IV Precedex     dexMEDEtomidine Infusion 0.5 MICROgram(s)/kG/Hr (7.7 mL/Hr) IV Continuous <Continuous>  meperidine     Injectable 25 milliGRAM(s) IV Push once  mirtazapine 7.5 milliGRAM(s) Oral daily  sertraline 100 milliGRAM(s) Oral daily    ==================== RESPIRATORY======================  Acute hypoxemic respiratory failure s/p #8 shiley trach on ; decannulated on ; Reintubated on ; trach size 6 DCT cuff on 10/4   Continue close monitoring of respiratory rate and breathing pattern, following of ABG’s with A-line monitoring, continuous pulse oximetry monitoring.   C/w TC as tolerated     Mechanical Ventilation:  Mode: pt on Trach collar    ====================CARDIOVASCULAR==================  Stage D Nonischemic Cardiomyopathy, Status Post HM2 on 2017; LVAD settings 9200, flow 4.6  TTE 10/4:  Severely decreased LV systolic function, Diffuse hypokinesis. Mild AR. No pericardial effusion   VT on 10/4, s/p Lidocaine drip, continue close tele monitoring   ASA for recent stent   Clopidogrel for peripheral artery disease     aspirin  chewable 81 milliGRAM(s) Oral daily  clopidogrel Tablet 75 milliGRAM(s) Oral daily    ===================HEMATOLOGIC/ONC ===================  CTA A/P on  +L RP hematoma   Acute blood loss anemia, monitor H&H/Plts   Holding coumadin given recurrent GI bleeding, continue monitoring LDH   Plan to DC heparin today     VTE prophylaxis, heparin   Injectable 5000 Unit(s) SubCutaneous every 8 hours    ===================== RENAL =========================  Hx of CKD, continue monitoring urine output, I&OS, BUN/Cr   Replete lytes PRN. Keep K> 4 and Mg >2    ==================== GASTROINTESTINAL===================  TFs held in setting of ongoing nausea / recent emesis   CT A/P 10/5 neg for intra-abd abcess, severe stenosis of prox SMA and b/l iliac arteries  CTA A/P : Evaluation of the mesenteric vessels is limited by streak artifact from LVAD. There appears to be severe stenosis of the proximal SMA; abdominal mesenteric doppler is recommended for further evaluation. 2.  No small bowel findings to suggest acute mesenteric ischemia. 3.  Focal dissections involving the right and left common iliac arteries.  Stenosis of proximal SMA, s/p failed SMA stent c/b RP bleed on 9/10 - SMA stent with Vascular 10/18   Simethicone PRN for gas   Reglan for gut motility  Zofran for nausea       dextrose 5% + sodium chloride 0.45% with potassium chloride 10 mEq/L 1000 milliLiter(s) (30 mL/Hr) IV Continuous <Continuous>  multivitamin 1 Tablet(s) Oral daily  GI prophylaxis, pantoprazole  Injectable 40 milliGRAM(s) IV Push every 12 hours  simethicone 80 milliGRAM(s) Chew every 8 hours PRN Gas  sodium chloride 0.9%. 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   CT C/A/P : progressive ISABELLE opacities and L consolidations, multifocal pneumonia  BCx 1/2 on 10/1 +Enterobacter cloacae complex, BCx  10/14+ Serratia marcescens   SCX on 10/14 +Stenotrophomonas maltophilia, c/w Bactrim and Vancomycin   Vancomycin solution for C. diff prophylaxis    trimethoprim / sulfamethoxazole IVPB 300 milliGRAM(s) IV Intermittent every 6 hours  vancomycin  IVPB 1000 milliGRAM(s) IV Intermittent every 12 hours      Patient requires continuous monitoring with bedside rhythm monitoring, pulse ox monitoring, and intermittent blood gas analysis. Care plan discussed with ICU care team. Patient remained critical and at risk for life threatening decompensation.     By signing my name below, I, Aparna Saleh, attest that this documentation has been prepared under the direction and in the presence of CLAUDIA Alfaro   Electronically signed: Cesia Villegas, 10-19-21 @ 07:16    I, Bertrand Turk, personally performed the services described in this documentation. all medical record entries made by the scribe were at my direction and in my presence. I have reviewed the chart and agree that the record reflects my personal performance and is accurate and complete  Electronically signed: CLAUDIA Alfaro        RICKY HAMPTON  MRN-[id number]  Patient is a 66y old  Male who presents with a chief complaint of Anemia, Supratherapeutic INR, Dark Stools (18 Oct 2021 17:14)    HPI:  65M PMH ACC/AHA stage D HF due to NICM HM2 LVAD , TV annuloplasty ring 17 as destination therapy due to severe peripheral artery disease with significant stenosis  SIADH, Depression, CKD-3 with hyperkalemia, past E. coli UTIs, driveline drainage (21) and COVID-19 (back in 2020)  He was recently seen in clinic where he complained of abdominal pain and dark stools w constipation back in May. He presents to [facility] ER today weakness and fatigue, moderate and + Black stools for three days, on coumadin secondary to warfarin use in the setting of an LVAD. Patient has required transfusions for GIB in the past. Mostly recently back in 2021 pt had anemia with dark stools. No interventions was done at that time. However Last Endoscopy was done in 2020 (negative). Today labs show patient is anemic with H/H of 4.5/16.3,. INR is 8.84 MAP in the 90s, Temp 35.1. He denies any chest pain, shortness of breath, dizziness, abd pain, nausea or vomiting.       (2021 16:57)      Surgery/Hospital Course:   admit for melena w/ anemia, INR 8.84   6/15 Capsul study (+) for small bowel bleed, balloon endoscopy (old blood in prox ileum); post EGD - septic w/ L opacity, re-intubated for concern for aspiration, TTE (Mod MR, decrease biV w/ interventricular septum boweing towards R)   bronch    +C Diff    CT C/A/P: Fluid filled colon which may be 2/2 rapid transit. Small bilateral pleffs with associates. Compressive atelectasis New ISABELLE & LLL  parenchymal opacities, suspicious for pneumonia. Moderate stenosis in the proximal superior mesenteric artery.    #8 Shiley trach at bedside    LVAD speed increased to 9200   Bronch   TC since . Patient transferred to SDU.    INR today 2.64.  H&H 7.3/24 this AM.  Will repeat CBC at noon, and will send stool guaiac Patient with persistent abdominal tenderness, rate of tube feeds decreased.  No nausea/vomiting.     INR today 2.4. H&H 9.1/28.6 low flow overnight /N&V, refusing Tube feeds on D5 1/2 normal  @50 cc/hr   INR 2.69  H&H 7.7/.1 refusing Tube feeds on D5 1/2 normal  @50 cc/hr. This am + BM Melena Dr Oneill HF  aware- PRBC x1  GI team consulted -  NPO  plan on study in am-  D/w Dr Cadet Patient  to return to CTU for further management; 1PRBCS    Post op INR 2.2 today.  No bleeding. BC + for SM.  Pt is hypotensive requiring pressor and inotropic support.  ID follow up today on Cefepime will follow.   R PTC for PTX    CT C/A/P: sub q emphysema in R chest wall, GGO RUL, small ascites CTH negative; Abd US: GB thickening, pericholecystic fluid     Perchole drain in place continues to drain total output overnight 133.  Fever today 38.8    duplex LE negative    Patient with persistent abdominal pain, refusing tube feeds and medications, Psych consulted   CTA A/P ordered to r/o mesenteric ischemia 2/2 persistent anorexia, nausea, vomiting. Revealed:  Evaluation of the mesenteric vessels is limited by streak artifact from LVAD. There appears to be severe stenosis of the proximal SMA; abdominal mesenteric doppler is recommended for further evaluation. 2.  No small bowel findings to suggest acute mesenteric ischemia. 3.  Focal dissections involving the right and left common iliac arteries.  8/15: Cultures resulted BC 1/2 +GPC in clusters, SC enterobacter; mesenteric duplex: borderline stenosis of proximal SMA  : CT C/A/P noncon: Nondular opacities in R lung apex w/cavitation, abd nl  :  Continue current care, treatment of thyrotoxicosis with medications as per endocrine, d/c ABX as per team.    RUQ sono: Contracted gallbladder with cholecystostomy tube in place.  9/10 failed SMA stent, c/b RP bleed s/p 3U PRCB   CTA Abd/Pelvis L RP hematoma    Trach decannulated    intubated fro resp distress for increased WOB, LL pneumonia; CT C/A/P: progressive ISABELLE opacities and L consolidations, multifocal pneumonia    TTE (EF 25%, decreased RV, mod MR)   10/3 VT -> Lidocaine gtt   10/4 Trach size 6 DCT cuff  10/5 CT abd (neg for intra-abd abcess, severe stenosis of prox SMA and b/l iliac arteries)  10/18 SMA Stent     Today:  - Continue to aggressively mobilize the patient   - Patient is OOBTC   - Continue to ambulate as tolerated   - Plan to DC heparin today     REVIEW OF SYSTEMS:  Gen: No fever  EYES/ENT: No visual changes;  No vertigo or throat pain   NECK: No pain   RES:  No shortness of breath or Cough  CARD: No chest pain   GI: +intermittently c/o abd pain and nausea   : No dysuria  NEURO: No weakness  SKIN: No itching, rashes    ICU Vital Signs Last 24 Hrs  T(C): 36.1 (19 Oct 2021 00:00), Max: 36.2 (18 Oct 2021 08:00)  T(F): 97 (19 Oct 2021 00:00), Max: 97.2 (18 Oct 2021 08:00)  HR: 80 (19 Oct 2021 06:00) (60 - 106)  BP: --  BP(mean): --  ABP: 84/71 (19 Oct 2021 04:00) (78/71 - 126/85)  ABP(mean): 77 (19 Oct 2021 04:00) (70 - 104)  RR: 8 (19 Oct 2021 04:00) (8 - 32)  SpO2: 100% (19 Oct 2021 06:00) (95% - 100%)      Physical Exam:  Gen:  NAD  CNS: moves all extremities to command   Neck: no JVD, +trach, +central line   RES : coarse breath sounds, no wheezing    CVS: +LVAD hum   Abd: Soft. Sybil drain with bilious fluid. Positive BS throughout. Mild RUQ abdominal tenderness by perc sybil.  Skin: No rash, erythema, cyanosis.  Vasc: Warm and well-perfused.  Ext:  no edema    ============================I/O===========================   I&O's Detail    18 Oct 2021 07:01  -  19 Oct 2021 07:00  --------------------------------------------------------  IN:    Albumin 5%  - 250 mL: 250 mL    Dexmedetomidine: 7.7 mL    dextrose 5% + sodium chloride 0.45% w/ Additives: 240 mL    dextrose 5% + sodium chloride 0.45% w/ Additives: 240 mL    IV PiggyBack: 850 mL  Total IN: 1587.7 mL    OUT:    Drain (mL): 190 mL    Miscellaneous Tube Feedin mL    Voided (mL): 875 mL  Total OUT: 1065 mL    Total NET: 522.7 mL        ============================ LABS =========================                        10.1   9.39  )-----------( 195      ( 19 Oct 2021 03:30 )             31.2     10-    129<L>  |  96  |  6<L>  ----------------------------<  90  4.8   |  23  |  0.55    Ca    9.6      19 Oct 2021 03:30  Phos  2.6     10-19  Mg     1.6     10-19    TPro  7.6  /  Alb  4.0  /  TBili  0.8  /  DBili  x   /  AST  19  /  ALT  20  /  AlkPhos  152<H>  10-19    LIVER FUNCTIONS - ( 19 Oct 2021 03:30 )  Alb: 4.0 g/dL / Pro: 7.6 g/dL / ALK PHOS: 152 U/L / ALT: 20 U/L / AST: 19 U/L / GGT: x           PT/INR - ( 18 Oct 2021 20:53 )   PT: 17.4 sec;   INR: 1.48 ratio         PTT - ( 18 Oct 2021 20:53 )  PTT:34.9 sec  ABG - ( 19 Oct 2021 03:19 )  pH, Arterial: 7.41  pH, Blood: x     /  pCO2: 43    /  pO2: 200   / HCO3: 27    / Base Excess: 2.3   /  SaO2: 100.0               ======================Micro/Rad/Cardio=================  Culture: Reviewed   CXR: Reviewed  Echo:Reviewed  ======================================================  PAST MEDICAL & SURGICAL HISTORY:  CHF (congestive heart failure)    CAD (coronary artery disease)    Depression    Pleural effusion    History of 2019 novel coronavirus disease (COVID-19)  2020    Hemorrhoids    Bleeding hemorrhoids    Peripheral arterial disease    Claudication    BPH with urinary obstruction    ACC/AHA stage D systolic heart failure    Anticoagulation goal of INR 2.0 to 2.5    Falls    Clavicle fracture    CKD (chronic kidney disease), stage III    Iron deficiency anemia    H/O epistaxis    Vertigo    GI bleed    S/P TVR (tricuspid valve repair)    S/P ventricular assist device    S/P endoscopy      ====================ASSESSMENT ==============  Stage D Nonischemic Cardiomyopathy, Status Post HM2 on 2017    Cardiogenic shock  Hemodynamic instability   Acute hypoxemic respiratory failure s/p trach , decannulated on ; Reintubated on    GI bleed , Status Post Enteroscopy   Anemia, in setting of melena   Chronic Kidney Disease  Stress hyperglycemia   C.diff positive on    Hypovolemic shock  Septic shock  Leukocytosis  GB thickening/percholecystic s/p perc sybil by IR   SMA stenosis  Serratia/citrobacter pneumonia   Stenotrophomonas pneumonia   Enterobacter pneumonia   Nasuea/vomiting  Deconditioning     Plan:  ====================== NEUROLOGY=====================  Patient is sedated with IV Precedex         mirtazapine 7.5 milliGRAM(s) Oral daily  sertraline 100 milliGRAM(s) Oral daily    ==================== RESPIRATORY======================  Acute hypoxemic respiratory failure s/p #8 shiley trach on ; decannulated on ; Reintubated on ; trach size 6 DCT cuff on 10/4   Continue close monitoring of respiratory rate and breathing pattern, following of ABG’s with A-line monitoring, continuous pulse oximetry monitoring.   C/w TC as tolerated     Mechanical Ventilation:  Mode: pt on Trach collar    ====================CARDIOVASCULAR==================  Stage D Nonischemic Cardiomyopathy, Status Post HM2 on 2017; LVAD settings 9200, flow 4.6  TTE 10/4:  Severely decreased LV systolic function, Diffuse hypokinesis. Mild AR. No pericardial effusion   VT on 10/4, s/p Lidocaine drip, continue close tele monitoring   ASA for recent stent   Clopidogrel for peripheral artery disease     aspirin  chewable 81 milliGRAM(s) Oral daily  clopidogrel Tablet 75 milliGRAM(s) Oral daily    ===================HEMATOLOGIC/ONC ===================  CTA A/P on  +L RP hematoma   Acute blood loss anemia, monitor H&H/Plts   Holding coumadin given recurrent GI bleeding, continue monitoring LDH   Plan to DC heparin today     VTE prophylaxis, heparin   Injectable 5000 Unit(s) SubCutaneous every 8 hours    ===================== RENAL =========================  Hx of CKD, continue monitoring urine output, I&OS, BUN/Cr   Replete lytes PRN. Keep K> 4 and Mg >2    ==================== GASTROINTESTINAL===================  TFs held in setting of ongoing nausea / recent emesis   CT A/P 10/5 neg for intra-abd abcess, severe stenosis of prox SMA and b/l iliac arteries  CTA A/P : Evaluation of the mesenteric vessels is limited by streak artifact from LVAD. There appears to be severe stenosis of the proximal SMA; abdominal mesenteric doppler is recommended for further evaluation. 2.  No small bowel findings to suggest acute mesenteric ischemia. 3.  Focal dissections involving the right and left common iliac arteries.  Stenosis of proximal SMA, s/p failed SMA stent c/b RP bleed on 9/10 - SMA stent with Vascular 10/18   Simethicone PRN for gas   Reglan for gut motility  Zofran for nausea       dextrose 5% + sodium chloride 0.45% with potassium chloride 10 mEq/L 1000 milliLiter(s) (30 mL/Hr) IV Continuous <Continuous>  multivitamin 1 Tablet(s) Oral daily  GI prophylaxis, pantoprazole  Injectable 40 milliGRAM(s) IV Push every 12 hours  simethicone 80 milliGRAM(s) Chew every 8 hours PRN Gas  sodium chloride 0.9%. 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   CT C/A/P : progressive ISABELLE opacities and L consolidations, multifocal pneumonia  BCx 1/2 on 10/1 +Enterobacter cloacae complex, BCx  10/14+ Serratia marcescens   SCX on 10/14 +Stenotrophomonas maltophilia, c/w Bactrim and Vancomycin   Vancomycin solution for C. diff prophylaxis    trimethoprim / sulfamethoxazole IVPB 300 milliGRAM(s) IV Intermittent every 6 hours  vancomycin  IVPB 1000 milliGRAM(s) IV Intermittent every 12 hours      Patient requires continuous monitoring with bedside rhythm monitoring, pulse ox monitoring, and intermittent blood gas analysis. Care plan discussed with ICU care team. Patient remained critical and at risk for life threatening decompensation.     By signing my name below, I, Aparna Saleh, attest that this documentation has been prepared under the direction and in the presence of CLAUDIA Alfaro   Electronically signed: Cesia Villegas, 10-19-21 @ 07:16    I, Bertrand Turk, personally performed the services described in this documentation. all medical record entries made by the luisibmel were at my direction and in my presence. I have reviewed the chart and agree that the record reflects my personal performance and is accurate and complete  Electronically signed: CLAUDIA Alfaro

## 2021-10-19 NOTE — PROGRESS NOTE ADULT - PROBLEM SELECTOR PLAN 1
- ACC/AHA stage D systolic heart failure s/p LVAD   - MAP goal 70, if remains hypertensive, should start afterload reduction

## 2021-10-19 NOTE — PROGRESS NOTE ADULT - PROBLEM SELECTOR PLAN 3
- S/p SMA stent 10/18  - Was started on aspirin and plavix by vascular, please f/u with vascular if only one antiplatelet agent can be used - S/p SMA stent 10/18  - Was started on aspirin and plavix by vascular, please f/u with vascular if only one antiplatelet agent can be used  - Restarting trickle feedings

## 2021-10-19 NOTE — PROGRESS NOTE ADULT - SUBJECTIVE AND OBJECTIVE BOX
RICKY HAMPTON  MRN-[id number]  Patient is a 66y old  Male who presents with a chief complaint of Anemia, Supratherapeutic INR, Dark Stools (19 Oct 2021 17:41)    HPI:  65M PMH ACC/AHA stage D HF due to NICM HM2 LVAD , TV annuloplasty ring 17 as destination therapy due to severe peripheral artery disease with significant stenosis  SIADH, Depression, CKD-3 with hyperkalemia, past E. coli UTIs, driveline drainage (21) and COVID-19 (back in 2020)  He was recently seen in clinic where he complained of abdominal pain and dark stools w constipation back in May. He presents to [facility] ER today weakness and fatigue, moderate and + Black stools for three days, on coumadin secondary to warfarin use in the setting of an LVAD. Patient has required transfusions for GIB in the past. Mostly recently back in 2021 pt had anemia with dark stools. No interventions was done at that time. However Last Endoscopy was done in 2020 (negative). Today labs show patient is anemic with H/H of 4.5/16.3,. INR is 8.84 MAP in the 90s, Temp 35.1. He denies any chest pain, shortness of breath, dizziness, abd pain, nausea or vomiting.       (2021 16:57)      Surgery/Hospital course:   admit for melena w/ anemia, INR 8.84   6/15 Capsul study (+) for small bowel bleed, balloon endoscopy (old blood in prox ileum); post EGD - septic w/ L opacity, re-intubated for concern for aspiration, TTE (Mod MR, decrease biV w/ interventricular septum boweing towards R)   bronch    +C Diff    CT C/A/P: Fluid filled colon which may be 2/2 rapid transit. Small bilateral pleffs with associates. Compressive atelectasis New ISABELLE & LLL  parenchymal opacities, suspicious for pneumonia. Moderate stenosis in the proximal superior mesenteric artery.    #8 Shiley trach at bedside    LVAD speed increased to 9200   Bronch   TC since . Patient transferred to SDU.    INR today 2.64.  H&H 7.3/24 this AM.  Will repeat CBC at noon, and will send stool guaiac Patient with persistent abdominal tenderness, rate of tube feeds decreased.  No nausea/vomiting.     INR today 2.4. H&H 9.1/28.6 low flow overnight /N&V, refusing Tube feeds on D5 1/2 normal  @50 cc/hr   INR 2.69  H&H 7.7/.1 refusing Tube feeds on D5 1/2 normal  @50 cc/hr. This am + BM Melena Dr Oneill HF  aware- PRBC x1  GI team consulted -  NPO  plan on study in am-  D/w Dr Cadet Patient  to return to CTU for further management; 1PRBCS    Post op INR 2.2 today.  No bleeding. BC + for SM.  Pt is hypotensive requiring pressor and inotropic support.  ID follow up today on Cefepime will follow.   R PTC for PTX    CT C/A/P: sub q emphysema in R chest wall, GGO RUL, small ascites CTH negative; Abd US: GB thickening, pericholecystic fluid     Perchole drain in place continues to drain total output overnight 133.  Fever today 38.8    duplex LE negative    Patient with persistent abdominal pain, refusing tube feeds and medications, Psych consulted   CTA A/P ordered to r/o mesenteric ischemia 2/2 persistent anorexia, nausea, vomiting. Revealed:  Evaluation of the mesenteric vessels is limited by streak artifact from LVAD. There appears to be severe stenosis of the proximal SMA; abdominal mesenteric doppler is recommended for further evaluation. 2.  No small bowel findings to suggest acute mesenteric ischemia. 3.  Focal dissections involving the right and left common iliac arteries.  8/15: Cultures resulted BC 1/2 +GPC in clusters, SC enterobacter; mesenteric duplex: borderline stenosis of proximal SMA  : CT C/A/P noncon: Nondular opacities in R lung apex w/cavitation, abd nl  :  Continue current care, treatment of thyrotoxicosis with medications as per endocrine, d/c ABX as per team.    RUQ sono: Contracted gallbladder with cholecystostomy tube in place.  9/10 failed SMA stent, c/b RP bleed s/p 3U PRCB   CTA Abd/Pelvis L RP hematoma    Trach decannulated    intubated fro resp distress for increased WOB, LL pneumonia; CT C/A/P: progressive ISABELLE opacities and L consolidations, multifocal pneumonia    TTE (EF 25%, decreased RV, mod MR)   10/3 VT -> Lidocaine gtt   10/4 Trach size 6 DCT cuff  10/5 CT abd (neg for intra-abd abcess, severe stenosis of prox SMA and b/l iliac arteries)  10/18 SMA Stent     Today/Overnight:    Vital Signs Last 24 Hrs  T(C): 36.2 (19 Oct 2021 20:00), Max: 36.6 (19 Oct 2021 08:00)  T(F): 97.2 (19 Oct 2021 20:00), Max: 97.9 (19 Oct 2021 08:00)  HR: 113 (19 Oct 2021 22:00) (60 - 113)  BP: --  BP(mean): --  RR: 13 (19 Oct 2021 22:00) (8 - 28)  SpO2: 97% (19 Oct 2021 22:00) (97% - 100%)  ============================I/O===========================  I&O's Detail    18 Oct 2021 07:01  -  19 Oct 2021 07:00  --------------------------------------------------------  IN:    Albumin 5%  - 250 mL: 250 mL    Dexmedetomidine: 7.7 mL    dextrose 5% + sodium chloride 0.45% w/ Additives: 300 mL    dextrose 5% + sodium chloride 0.45% w/ Additives: 240 mL    IV PiggyBack: 850 mL  Total IN: 1647.7 mL    OUT:    Drain (mL): 190 mL    Miscellaneous Tube Feedin mL    Voided (mL): 875 mL  Total OUT: 1065 mL    Total NET: 582.7 mL      19 Oct 2021 07:01  -  19 Oct 2021 22:38  --------------------------------------------------------  IN:    dextrose 5% + sodium chloride 0.45% w/ Additives: 450 mL    IV PiggyBack: 936 mL    Miscellaneous Tube Feedin mL  Total IN: 1506 mL    OUT:    Dexmedetomidine: 0 mL    Drain (mL): 120 mL    Emesis (mL): 50 mL    Voided (mL): 1700 mL  Total OUT: 1870 mL    Total NET: -364 mL        ============================ LABS =========================                        10.1   9.39  )-----------( 195      ( 19 Oct 2021 03:30 )             31.2     10-19    129<L>  |  94<L>  |  5<L>  ----------------------------<  168<H>  4.1   |  23  |  0.55    Ca    9.2      19 Oct 2021 14:18  Phos  2.5     10-19  Mg     1.4     10-19    TPro  7.0  /  Alb  3.7  /  TBili  0.7  /  DBili  x   /  AST  19  /  ALT  21  /  AlkPhos  155<H>  10-19    LIVER FUNCTIONS - ( 19 Oct 2021 14:18 )  Alb: 3.7 g/dL / Pro: 7.0 g/dL / ALK PHOS: 155 U/L / ALT: 21 U/L / AST: 19 U/L / GGT: x           PT/INR - ( 18 Oct 2021 20:53 )   PT: 17.4 sec;   INR: 1.48 ratio         PTT - ( 18 Oct 2021 20:53 )  PTT:34.9 sec  ABG - ( 19 Oct 2021 03:19 )  pH, Arterial: 7.41  pH, Blood: x     /  pCO2: 43    /  pO2: 200   / HCO3: 27    / Base Excess: 2.3   /  SaO2: 100.0               ======================Micro/Rad/Cardio=================  Culture: Reviewed   CXR: Reviewed  Echo: Reviewed  ======================================================  PAST MEDICAL & SURGICAL HISTORY:  CHF (congestive heart failure)    CAD (coronary artery disease)    Depression    Pleural effusion    History of  novel coronavirus disease (COVID-19)  2020    Hemorrhoids    Bleeding hemorrhoids    Peripheral arterial disease    Claudication    BPH with urinary obstruction    ACC/AHA stage D systolic heart failure    Anticoagulation goal of INR 2.0 to 2.5    Falls    Clavicle fracture    CKD (chronic kidney disease), stage III    Iron deficiency anemia    H/O epistaxis    Vertigo    GI bleed    S/P TVR (tricuspid valve repair)    S/P ventricular assist device    S/P endoscopy      ========================ASSESSMENT ================  Stage D Nonischemic Cardiomyopathy, Status Post HM2 on 2017    Cardiogenic shock  Hemodynamic instability   Acute hypoxemic respiratory failure s/p trach , decannulated on ; Reintubated on    GI bleed , Status Post Enteroscopy   Anemia, in setting of melena   Chronic Kidney Disease  Stress hyperglycemia   C.diff positive on    Hypovolemic shock  Septic shock  Leukocytosis  GB thickening/percholecystic s/p perc dawn by IR   SMA stenosis  Serratia/citrobacter pneumonia   Stenotrophomonas pneumonia   Enterobacter pneumonia   Nasuea/vomiting  Deconditioning     Plan:  ====================== NEUROLOGY=====================  Sedated with IV Precedex to maintain vent synchrony   C/w Mirtazapine and Sertraline for depression     dexMEDEtomidine Infusion 0.5 MICROgram(s)/kG/Hr (7.7 mL/Hr) IV Continuous <Continuous>  mirtazapine 7.5 milliGRAM(s) Oral daily  sertraline 100 milliGRAM(s) Oral daily    ==================== RESPIRATORY======================  Acute hypoxemic respiratory failure s/p #8 shiley trach on ; decannulated on ; Reintubated on ; trach size 6 DCT cuff on 10/4   Continue close monitoring of respiratory rate and breathing pattern, following of ABG’s with A-line monitoring, continuous pulse oximetry monitoring.   Continue TC as tolerated     Mechanical Ventilation:  Mode: 40% T/C    ====================CARDIOVASCULAR==================  Stage D Nonischemic Cardiomyopathy, Status Post HM2 on 2017; LVAD settings 9200, flow 4.6  TTE 10/4:  Severely decreased LV systolic function, Diffuse hypokinesis. Mild AR. No pericardial effusion   VT on 10/4, s/p Lidocaine drip, continue close tele monitoring   ASA for recent stent     aspirin  chewable 81 milliGRAM(s) Oral daily    ===================HEMATOLOGIC/ONC ===================  CTA A/P on  +L RP hematoma   Acute blood loss anemia, monitor H&H/Plts   S/p Plavix load, continue Plavix as per Vascular recs     clopidogrel Tablet 75 milliGRAM(s) Oral daily    ===================== RENAL =========================  Hx of CKD, continue monitoring I&Os, BUN/Cr, and urine output   Replete lytes PRN. Keep K> 4 and Mg >2    ==================== GASTROINTESTINAL===================  TFs held in setting of ongoing nausea / recent emesis   CT A/P 10/5 neg for intra-abd abcess, severe stenosis of prox SMA and b/l iliac arteries  CTA A/P : Evaluation of the mesenteric vessels is limited by streak artifact from LVAD. There appears to be severe stenosis of the proximal SMA; abdominal mesenteric doppler is recommended for further evaluation. 2.  No small bowel findings to suggest acute mesenteric ischemia. 3.  Focal dissections involving the right and left common iliac arteries.  Stenosis of proximal SMA, s/p failed SMA stent c/b RP bleed on 9/10 - SMA stent with Vascular 10/18   Simethicone PRN for gas   Reglan for gut motility  Zofran for nausea     dextrose 5% + sodium chloride 0.45% with potassium chloride 10 mEq/L 1000 milliLiter(s) (30 mL/Hr) IV Continuous <Continuous>  multivitamin 1 Tablet(s) Oral daily  GI prophylaxis, pantoprazole  Injectable 40 milliGRAM(s) IV Push every 12 hours  simethicone 80 milliGRAM(s) Chew every 8 hours PRN Gas  sodium chloride 0.9%. 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   CT C/A/P : progressive ISABELLE opacities and L consolidations, multifocal pneumonia  BCx 1/2 on 10/1 +Enterobacter cloacae complex, BCx  10/14+ Serratia marcescens   SCX on 10/14 +Stenotrophomonas maltophilia, c/w Bactrim and Vancomycin     trimethoprim / sulfamethoxazole IVPB 300 milliGRAM(s) IV Intermittent every 6 hours  vancomycin  IVPB 1000 milliGRAM(s) IV Intermittent every 12 hours      Patient requires continuous monitoring with bedside rhythm monitoring, pulse oximetry monitoring, and continuous central venous and arterial pressure monitoring; and intermittent blood gas analysis.  Care plan discussed with ICU care team.    Patient remained critical, at risk for life threatening decompensation.   I have spent 35 minutes providing acute care with multiple re-evaluations throughout the evening.     By signing my name below, I, Agnieszka Cherry, attest that this documentation has been prepared under the direction and in the presence of Kali Mendoza NP   Electronically signed: Cesia Shaffer, Kali Mendoza, personally performed the services described in this documentation. All medical record entries made by the scribe were at my direction and in my presence. I have reviewed the chart and agree that the record reflects my personal performance and is accurate and complete  Electronically signed: Kali Mendoza NP 10-19-21 @ 22:38

## 2021-10-19 NOTE — PROGRESS NOTE ADULT - SUBJECTIVE AND OBJECTIVE BOX
Subjective:   Patient seen at bedside this AM. Patient endorses continued abdominal pain consistent with prior days.     Objective:  Vital Signs  T(C): 36.6 (10-19 @ 08:00), Max: 36.6 (10-19 @ 08:00)  HR: 93 (10-19 @ 10:00) (60 - 106)  BP: --  RR: 9 (10-19 @ 10:00) (8 - 29)  SpO2: 98% (10-19 @ 10:00) (98% - 100%)  10-18-21 @ 07:01  -  10-19-21 @ 07:00  --------------------------------------------------------  IN:  Total IN: 0 mL    OUT:    Drain (mL): 190 mL    Voided (mL): 875 mL  Total OUT: 1065 mL    Total NET: -1065 mL      10-19-21 @ 07:01  -  10-19-21 @ 11:01  --------------------------------------------------------  IN:  Total IN: 0 mL    OUT:    Voided (mL): 650 mL  Total OUT: 650 mL    Total NET: -650 mL      PHYSICAL EXAM:  Gen: NAD, well-developed, responsive   HEENT: trach collar  Abd: soft, tender to deep palpation  Extremities: Upper extremity access site without any palpable mass. Distal pulses intact and palpable. Neurologically intact.       Labs:                        10.1   9.39  )-----------( 195      ( 19 Oct 2021 03:30 )             31.2   10-19    129<L>  |  96  |  6<L>  ----------------------------<  90  4.8   |  23  |  0.55    Ca    9.6      19 Oct 2021 03:30  Phos  2.6     10-19  Mg     1.6     10-19    TPro  7.6  /  Alb  4.0  /  TBili  0.8  /  DBili  x   /  AST  19  /  ALT  20  /  AlkPhos  152<H>  10-19    CAPILLARY BLOOD GLUCOSE      POCT Blood Glucose.: 103 mg/dL (19 Oct 2021 05:05)  POCT Blood Glucose.: 92 mg/dL (19 Oct 2021 01:25)  POCT Blood Glucose.: 100 mg/dL (18 Oct 2021 11:32)      Medications:   MEDICATIONS  (STANDING):  aspirin  chewable 81 milliGRAM(s) Oral daily  chlorhexidine 0.12% Liquid 15 milliLiter(s) Oral Mucosa every 12 hours  chlorhexidine 0.12% Liquid 5 milliLiter(s) Oral Mucosa two times a day  clopidogrel Tablet 75 milliGRAM(s) Oral daily  dexMEDEtomidine Infusion 0.5 MICROgram(s)/kG/Hr (7.7 mL/Hr) IV Continuous <Continuous>  dextrose 40% Gel 15 Gram(s) Oral once  dextrose 5% + sodium chloride 0.45% with potassium chloride 10 mEq/L 1000 milliLiter(s) (30 mL/Hr) IV Continuous <Continuous>  dextrose 50% Injectable 12.5 Gram(s) IV Push once  heparin   Injectable 5000 Unit(s) SubCutaneous every 8 hours  hydrocortisone sodium succinate Injectable 25 milliGRAM(s) IV Push daily  insulin lispro (ADMELOG) corrective regimen sliding scale   SubCutaneous every 6 hours  meperidine     Injectable 25 milliGRAM(s) IV Push once  methimazole 20 milliGRAM(s) Oral <User Schedule>  metoclopramide Injectable 10 milliGRAM(s) IV Push every 8 hours  mirtazapine 7.5 milliGRAM(s) Oral daily  multivitamin 1 Tablet(s) Oral daily  pantoprazole  Injectable 40 milliGRAM(s) IV Push every 12 hours  sertraline 100 milliGRAM(s) Oral daily  sodium chloride 0.9%. 1000 milliLiter(s) (10 mL/Hr) IV Continuous <Continuous>  thiamine 100 milliGRAM(s) Oral daily  trimethoprim / sulfamethoxazole IVPB 300 milliGRAM(s) IV Intermittent every 6 hours  vancomycin  IVPB 1000 milliGRAM(s) IV Intermittent every 12 hours    MEDICATIONS  (PRN):  ondansetron Injectable 4 milliGRAM(s) IV Push every 8 hours PRN Nausea and/or Vomiting  simethicone 80 milliGRAM(s) Chew every 8 hours PRN Gas      Imaging:

## 2021-10-20 NOTE — PROGRESS NOTE ADULT - PROBLEM SELECTOR PLAN 5
- Abx duration and choice as per ID  - Prior cholecystitis w/ per dawn drain with bilious output. - S/p trach 10/4 by ENT, consent given by HCP sister.  - currently tolerating trach collar

## 2021-10-20 NOTE — PROGRESS NOTE ADULT - PROBLEM SELECTOR PLAN 3
- S/p SMA stent 10/18  - Was started on aspirin and plavix by vascular, please f/u with vascular if only one antiplatelet agent can be used  - Restarting trickle feedings - S/p SMA stent 10/18  - currently on aspirin and plavix for stent, please f/u with vascular if only one antiplatelet agent can be used  - currently holding tube feeds as patient continues to have episodes of nausea and vomiting - S/p SMA stent 10/18  - currently on aspirin and plavix for stent, please f/u with vascular if only one antiplatelet agent can be used

## 2021-10-20 NOTE — PROGRESS NOTE ADULT - PROBLEM SELECTOR PLAN 2
S/p LVAD.  - Treatment in CTICU  - Followed by HF team Today, he was c/o cough with associated GE reflux   On Protonix bid   Will add Duoneb q 6ATC x 24 hours.   Not sure if recurrent secretions may be triggering cough. May consider low dose Glyco (0.2mg) IV q 6 PRN while monitoring for thick secretions (currently thin).   On Reglan.

## 2021-10-20 NOTE — PROGRESS NOTE ADULT - ASSESSMENT
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 coli UTIs, driveline drainage (1/7/21) and COVID-19 (back in April 2020) with prolonged hospitalization c/b acute hypoxic respiratory failure in the setting of refractory nausea/vomiting, currently mechanically ventilated, being treated for GNR bacteremia.     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 coli UTIs, driveline drainage (1/7/21) and COVID-19 (back in April 2020) with prolonged hospitalization c/b acute hypoxic respiratory failure, GIB, retroperitoneal bleed, bacteremia, and recurrent abdominal pain, nausea and vomiting that were suspected to be 2/2 to SMA stenosis s/p stent. Palliative care is f/u for GOC, ACP, symptoms and resources.

## 2021-10-20 NOTE — PROGRESS NOTE ADULT - SUBJECTIVE AND OBJECTIVE BOX
Behavioral Cardiology Progress Note     History of present illness: Mr. Quispe is a 66 year-old man with history of NICM s/p HM2 on 9/2017 as DT (due to severe PAD) with TV ring, prior COVID-19 infection 4/2020, recurrent syncope post LVAD s/p ILR, and chronic abdominal pain with prior negative workup who was admitted with symptomatic anemia with Hgb 4.5 in setting of INR 8.8 without hemodynamic instability. Testing showed active bleeding in the small bowel but subsequent enteroscopy 6/15 did not reveal any active bleeding. He acutely decompensated after procedure with fever/hypertension, low flow alarms, and pulmonary infiltrate with hypoxia requiring intubation from probable aspiration PNA.  Course notable for inability to wean ventilator with persistent secretions for which he underwent tracheostomy as well as acute c diff colitis.     Social history: , lives in his sister’s house in [place]. Has 3 sons (2 live in , 1 in [facility]). One of 3 siblings. Lives in his sister’s house in [place] (Cristina Rudolph [phone]), also in the home are niece [phone]) and nephew (Miller Rudolph).  Another sister lives close by in [place] and all other siblings live in [facility] which the family visit often.  Worked full time at [employer] in [place], stopped working due to heart disease. Education: high school graduate.     Substance use:   Tobacco: Former smoker, 8-10 cigarettes/day for 30 years.   Alcohol: Past use of 3-4 drinks of Johnson 2x/week. None for past 4 years.   Drug: Denies any drug use.

## 2021-10-20 NOTE — PROGRESS NOTE ADULT - SUBJECTIVE AND OBJECTIVE BOX
Subjective: Patient seen and examined resting in bed. Continues to have episodes of vomiting    Medications:  aspirin  chewable 81 milliGRAM(s) Oral daily  chlorhexidine 0.12% Liquid 5 milliLiter(s) Oral Mucosa two times a day  chlorhexidine 0.12% Liquid 15 milliLiter(s) Oral Mucosa every 12 hours  clopidogrel Tablet 75 milliGRAM(s) Oral daily  dexMEDEtomidine Infusion 0.5 MICROgram(s)/kG/Hr IV Continuous <Continuous>  dextrose 40% Gel 15 Gram(s) Oral once  dextrose 5% + sodium chloride 0.45% with potassium chloride 10 mEq/L 1000 milliLiter(s) IV Continuous <Continuous>  dextrose 50% Injectable 12.5 Gram(s) IV Push once  insulin lispro (ADMELOG) corrective regimen sliding scale   SubCutaneous every 6 hours  methimazole 20 milliGRAM(s) Oral <User Schedule>  metoclopramide Injectable 10 milliGRAM(s) IV Push every 8 hours  mirtazapine 7.5 milliGRAM(s) Oral daily  multivitamin 1 Tablet(s) Oral daily  ondansetron Injectable 4 milliGRAM(s) IV Push every 8 hours PRN  pantoprazole  Injectable 40 milliGRAM(s) IV Push every 12 hours  predniSONE   Tablet 20 milliGRAM(s) Oral every 24 hours  sertraline 100 milliGRAM(s) Oral daily  simethicone 80 milliGRAM(s) Chew every 8 hours PRN  sodium chloride 0.9%. 1000 milliLiter(s) IV Continuous <Continuous>  thiamine 100 milliGRAM(s) Oral daily  trimethoprim / sulfamethoxazole IVPB 300 milliGRAM(s) IV Intermittent every 6 hours  vancomycin  IVPB 1000 milliGRAM(s) IV Intermittent every 12 hours      ICU Vital Signs Last 24 Hrs  T(C): 36.1 (20 Oct 2021 04:00), Max: 36.6 (19 Oct 2021 08:00)  T(F): 97 (20 Oct 2021 04:00), Max: 97.9 (19 Oct 2021 08:00)  HR: 112 (20 Oct 2021 06:30) (81 - 144)  BP: --  BP(mean): --  ABP: 106/67 (20 Oct 2021 06:30) (84/61 - 124/64)  ABP(mean): 82 (20 Oct 2021 06:30) (70 - 102)  RR: 20 (20 Oct 2021 06:30) (8 - 32)  SpO2: 100% (20 Oct 2021 06:30) (94% - 100%)      Mode: Vent off  FiO2: 40      Weight in k.7 (10-20 @ 00:00)    I&O's Summary    19 Oct 2021 07:01  -  20 Oct 2021 07:00  --------------------------------------------------------  IN: 2326 mL / OUT: 3045 mL / NET: -719 mL        Physical Exam  General: No distress. Comfortable.  Neck: +trach collar  Chest: Clear to auscultation bilaterally  CV: +VAD hum  Abdomen: Soft, non-distended, tenderness over epigastric region   Skin: No rashes or skin breakdown  Neurology: Alert and oriented times three.    LVAD Interrogation  VAD TYPE HM 2  Speed 9200  Flow 5.5  Power 5.8  PI  6.2  Assessment of driveline exit site: driveline dressing c/d/i  Programming changes: no changes made    Labs:                        10.3   14.92 )-----------( 233      ( 20 Oct 2021 01:06 )             31.8     10-20    129<L>  |  92<L>  |  5<L>  ----------------------------<  99  4.3   |  24  |  0.53    Ca    9.6      20 Oct 2021 01:06  Phos  2.4     10-20  Mg     2.4     10-20    TPro  7.9  /  Alb  4.2  /  TBili  0.8  /  DBili  x   /  AST  17  /  ALT  22  /  AlkPhos  192<H>  10-20    PT/INR - ( 20 Oct 2021 01:06 )   PT: 16.3 sec;   INR: 1.38 ratio         PTT - ( 20 Oct 2021 01:06 )  PTT:28.1 sec          Lactate Dehydrogenase, Serum: 227 U/L (10-19 @ 03:30)  Lactate Dehydrogenase, Serum: 229 U/L (10-18 @ 01:03)

## 2021-10-20 NOTE — PROGRESS NOTE ADULT - PROBLEM SELECTOR PLAN 1
Chronic, thought to be 2/2 SMA stenosis based on angio 9/11. Unable to place stent due to retroperitoneal hematoma previously but vascular now open to procedure.  - GOC unable to be established with patient at this time- he is reluctant to engage in conversation. Planning GOC meeting with HF and vascular-> will have SW/ CTU team assist.   - C/w Reglan 5mg IV q 6  PRN When evaluated he denied active symptoms.   s/p SMA Stent  On Reglan 10mg IV q 8 ATC.   Will add Zofran 8mg IV q 8ATC  Today, he agreed with re-starting tube feeds.

## 2021-10-20 NOTE — PROGRESS NOTE ADULT - PROBLEM SELECTOR PLAN 1
- ACC/AHA stage D systolic heart failure s/p LVAD   - MAP goal 70, if remains hypertensive, should start afterload reduction - ACC/AHA stage D systolic heart failure s/p LVAD   - MAP goal 70, if remains hypertensive, will start afterload reduction.

## 2021-10-20 NOTE — PROGRESS NOTE ADULT - SUBJECTIVE AND OBJECTIVE BOX
RICKY JOINT  MRN#: [id number]  Subjective:  Pulmonary progress  : recurrent Acute hypoxic respiratory Failure ,aspiration pneumonia, NICM  ,   65M PMH ACC/AHA stage D HF due to NICM HM2 LVAD , TV annuloplasty ring 17 as destination therapy due to severe peripheral artery disease with significant stenosis  SIADH, Depression, CKD-3 with hyperkalemia, past E. coli UTIs, driveline drainage (21) and COVID-19 (back in 2020)  He was recently seen in clinic where he complained of abdominal pain and dark stools w constipation back in May. He presents to [facility] ER today weakness and fatigue, moderate and + Black stools for three days, on coumadin secondary to warfarin use in the setting of an LVAD. Patient has required transfusions for GIB in the past. Mostly recently back in 2021 pt had anemia with dark stools. No interventions was done at that time. However Last Endoscopy was done in 2020 (negative). Today labs show patient is anemic with H/H of 4.5/16.3,. INR is 8.84 MAP in the 90s, Temp 35.1. He denies any chest pain, shortness of breath, dizziness, abd pain, nausea or vomiting. found to have  rectal bleeding underwent endoscopy ,old blood in the proximal ileum ,  develop sepsis with LL opacity given Antibiotics , Extubated , reintubated , Bronchoscopy on Zosyn for LL pneumonia  and Amiodarone S/P TV Annuloplasty , patient remain intubated on full ventilatory support .S/P multiple units of blood transfusion , remain on full ventilatory support on Precedex and propofol , new central IJ line , diarrhea C diff. +ve on po vancomycin and IV Flagyl,  mildly distended belly , fever start on cefepime 2gm q 8 hrs S/P tracheostomy .  new RT Subclavian central line continue on contact  isolation ,S/P  C-diff antibiotics, no more diarrhea back on full support mechanical ventilator , chest x ray show improvement in LLL air space disease, more awake and responsive on tube feeding no more diarrhea ,  no nausea or vomiting or diarrhea still very weak and tired , back on tube feeding ,still on po vancomycin , getting PT and OT at bed side ,   , no SOB getting stronger , improve muscle tone patient transfer to monitor bed still on contact isolation for C-Difficel colitis on 50% FI02, and change to Suresh  tube feeding still loose stool . H/H drop significantly require blood transfusion , most likely GI bleeding , IV heparin D/C ,  H/H is stable ., patient develop TR sided  pneumothorax require chest tube placement , RT IJ central line  placed , develop fever shaking chills , blood culture positive for serratia marcescens , start on cefepime .the patient  become hypoxic and hypotensive placed on full ventilatory support and Vasopressin , levophed and Dobutamine ,S/P blood transfusion on meropenem and vancomycin ,   , on and  off pressors , occasional agitation on Precedex .S/P IR cholecystostomy tube drainage placement in the RT upper Quadrant , resume anticoagulation chest x ray noted C-PAP trail lasted only for 2 hrs , new RT SC line and D/C RT IJ line , RT pig tail cathter has been removed , tolerating C-PAP trial placed on trach. collar 50% FI02 GI consultant noted on NG tube feeding as tolerated , develop AF RVR S/P  bolus Amiodarone  back to regular sinus Rhythm , Flat Affect depressed , back on tube feeding Vital AF at 60 cc/ hr .still intermittent abdominal pain , no fever saturation is accepted  back on full ventilatory support ,   on methimazole for hyperthyroidism ,  condition is the same ,still C/O Abdominal pain , white count is improving , no chest pain or SOB ,  .placed on Reglan 10 mg TID for gastric motility , depressed , withdrawn. , S/P  mesenteric angiogram , unable to stent SMA S/P 3 units of blood transfusion   RT IJ in place reassessed for stent in the SMA by vascular,  dark stool stable H/H ,surgical opinion for possible Lap cholecystectomy. over night events noted develop respiratory distress, , rectal bleeding, require intubation placed on full ventilatory support , FI02 is 50% also became hemianosmically unstable require triple pressor support with levophed , vasopressin and norsynephrine, pan culture placed  on Vancomycin IV and PO  and Zosyn, sedated with propofol kept NPO , new central line RT and left IJ cathter placed . Diflucan Added .fever of 38.5 weaned off  vasopressin , all culture are negative, resume tube feeding ,  white count is improving , on meropenem,  and bactrim   fever adding IV vancomycin and  vancomycin solution  , and Bactrim , S/P  tracheostomy , bleeding receiving blood transfusion on vasopressin , no more bleeding , no fever , more awake and responsive ,tolerating tube feeding , ID and heart failure follow up appreciated , depresses no weaning for now , magnesium is low to give supplement too keep Above 2 , potassium is low to give supplement , patient S/P  vascular procedure for superior mesenteric artery occlusion S/P 2 stent placement , patient tolerate it very well  , left upper extremities hematoma .     (2021 16:57)    PAST MEDICAL & SURGICAL HISTORY:  CHF (congestive heart failure)    CAD (coronary artery disease)  Depression    Pleural effusion    History of 2019 novel coronavirus disease (COVID-19)  2020    Hemorrhoids    Bleeding hemorrhoids    Peripheral arterial disease    Claudication    BPH with urinary obstruction    ACC/AHA stage D systolic heart failure  Anticoagulation goal of INR 2.0 to 2.5    Falls    Clavicle fracture    CKD (chronic kidney disease), stage III    Iron deficiency anemia    H/O epistaxis    Vertigo    GI bleed    S/P TVR (tricuspid valve repair)    S/P ventricular assist device    S/P endoscopy    OBJECTIVE:  ICU Vital Signs Last 24 Hrs  T(C): 38.2 (2021 10:00), Max: 38.5 (2021 12:00)  T(F): 100.8 (2021 10:00), Max: 101.3 (2021 12:00)  HR: 65 (2021 10:00) (61 - 69)  BP: --  BP(mean): --  ABP: 105/67 (2021 10:00) (90/54 - 113/64)  ABP(mean): 77 (2021 10:00) (63 - 77)  RR: 20 (2021 10:00) (19 - 35)  SpO2: 99% (2021 10:00) (96% - 100%)       @ 07:  -   @ 07:00  --------------------------------------------------------  IN: 2693.9 mL / OUT: 1415 mL / NET: 1278.9 mL     @ 07:01  -   @ 10:49  --------------------------------------------------------  IN: 420.8 mL / OUT: 115 mL / NET: 305.8 mL  PHYSICAL EXAM: Daily   Elderly male  on trach. collar  FI02 is 40% S/P tracheostomy   Daily Weight in k.4 (2021 00:00)  HEENT:     + NCAT  + EOMI  - Conjuctival edema   - Icterus   - Thrush   - ETT  + NGT/OGT  Neck:         + FROM  RT IJ , LT IJ  lines JVD  - Nodes - Masses + Mid-line trachea + Tracheostomy  Chest:            normal A-P diameter    Lungs:          + CTA   + Rhonchi    - Rales    - Wheezing + Decreased  LT BS   - Dullness R L  Cardiac:       + S1 + S2    + RRR   - Irregular   - S3  - S4    - Murmurs   - Rub   - Hamman’s sign   Abdomen:    + BS  + Soft + Non-tender - Distended - Organomegaly - PEG .cholecystostomy tube in place  Extremities:   - Cyanosis U/L   - Clubbing  U/L  + LE/UE Edema LUE hematoma   + Capillary refill    + Pulses   Neuro:        - Awake   -  Alert   - Confused   - Lethargic   + Sedated  + Generalized Weakness  Skin:        - Rashes    - Erythema   + Normal incisions   + IV sites intact          HOSPITAL MEDICATIONS: All medications reviewed and analyzed  MEDICATIONS  (STANDING):  amiodarone    Tablet 200 milliGRAM(s) Oral daily  chlorhexidine 0.12% Liquid 15 milliLiter(s) Oral Mucosa every 12 hours  chlorhexidine 2% Cloths 1 Application(s) Topical <User Schedule>  dexmedetomidine Infusion 0.5 MICROgram(s)/kG/Hr (9.81 mL/Hr) IV Continuous <Continuous>  dextrose 50% Injectable 50 milliLiter(s) IV Push every 15 minutes  heparin  Infusion 400 Unit(s)/Hr (12.5 mL/Hr) IV Continuous <Continuous>  Hydromorphone  Injectable 0.5 milliGRAM(s) IV Push once  insulin lispro (ADMELOG) corrective regimen sliding scale   SubCutaneous every 6 hours  pantoprazole  Injectable 40 milliGRAM(s) IV Push every 12 hours  piperacillin/tazobactam IVPB.. 3.375 Gram(s) IV Intermittent every 8 hours  propofol Infusion 20 MICROgram(s)/kG/Min (9.42 mL/Hr) IV Continuous <Continuous>  sodium chloride 0.9% lock flush 3 milliLiter(s) IV Push every 8 hours  sodium chloride 0.9%. 1000 milliLiter(s) (10 mL/Hr) IV Continuous <Continuous>    MEDICATIONS  (PRN):  acetaminophen    Suspension .. 650 milliGRAM(s) Oral every 6 hours PRN Temp greater or equal to 38C (100.4F)    LABS: All Lab data reviewed and analyzed                        10.3    )-----------( 233      ( 20 Oct 2021 01:06 )             31.8   10-20    129<L>  |  92<L>  |  5<L>  ----------------------------<  99  4.3   |  24  |  0.53    Ca    9.6      20 Oct 2021 01:06  Phos  2.4     10-20  Mg     2.4     10-20    TPro  7.9  /  Alb  4.2  /  TBili  0.8  /  DBili  x   /  AST  17  /  ALT  22  /  AlkPhos  192<H>  10-20    Ca    9.2      19 Oct 2021 14:18  Phos  2.5     10-19  Mg     1.4     10-19    TPro  7.0  /  Alb  3.7  /  TBili  0.7  /  DBili  x   /  AST  19  /  ALT  21  /  AlkPhos  155<H>  10-19                                                                                                                                                                        PTT - ( 2021 04:52 )  PTT:45.2 sec LIVER FUNCTIONS - ( 2021 00:42 )  Alb: 3.4 g/dL / Pro: 6.7 g/dL / ALK PHOS: 213 U/L / ALT: 15 U/L / AST: 24 U/L / GGT: x           RADIOLOGY: - Reviewed and analyzed RT Pig tail cathter  , LVAD HM2, CT scan of abdomen reviewed result noted

## 2021-10-20 NOTE — PROGRESS NOTE ADULT - SUBJECTIVE AND OBJECTIVE BOX
INFECTIOUS DISEASES FOLLOW UP-- Anitra Prater  [phone]    This is a follow up note for this  66yMale with  Anemia  interval development of cellulitis of left forearm  clearing of serratia bacteremia        ROS:  CONSTITUTIONAL:  No fever, good appetite  CARDIOVASCULAR:  No chest pain or palpitations  RESPIRATORY:  No dyspnea  GASTROINTESTINAL:  No nausea, vomiting, diarrhea, or abdominal pain  GENITOURINARY:  No dysuria  NEUROLOGIC:  No headache,     Allergies    Amiodarone Hydrochloride (Other (Severe))    Intolerances        ANTIBIOTICS/RELEVANT:  antimicrobials  trimethoprim / sulfamethoxazole IVPB 300 milliGRAM(s) IV Intermittent every 6 hours  vancomycin  IVPB 1000 milliGRAM(s) IV Intermittent every 12 hours    immunologic:    OTHER:  albuterol/ipratropium for Nebulization 3 milliLiter(s) Nebulizer every 6 hours  aspirin  chewable 81 milliGRAM(s) Oral daily  chlorhexidine 0.12% Liquid 5 milliLiter(s) Oral Mucosa two times a day  chlorhexidine 0.12% Liquid 15 milliLiter(s) Oral Mucosa every 12 hours  clopidogrel Tablet 75 milliGRAM(s) Oral daily  dextrose 40% Gel 15 Gram(s) Oral once  dextrose 5% + sodium chloride 0.45% with potassium chloride 10 mEq/L 1000 milliLiter(s) IV Continuous <Continuous>  dextrose 50% Injectable 12.5 Gram(s) IV Push once  guaiFENesin  milliGRAM(s) Oral every 12 hours  insulin lispro (ADMELOG) corrective regimen sliding scale   SubCutaneous every 6 hours  methimazole 20 milliGRAM(s) Oral <User Schedule>  metoclopramide Injectable 10 milliGRAM(s) IV Push every 8 hours  mirtazapine 7.5 milliGRAM(s) Oral daily  multivitamin 1 Tablet(s) Oral daily  ondansetron Injectable 8 milliGRAM(s) IV Push every 8 hours  pantoprazole  Injectable 40 milliGRAM(s) IV Push every 12 hours  sertraline 100 milliGRAM(s) Oral daily  simethicone 80 milliGRAM(s) Chew every 8 hours PRN  sodium chloride 0.9%. 1000 milliLiter(s) IV Continuous <Continuous>  thiamine 100 milliGRAM(s) Oral daily      Objective:  Vital Signs Last 24 Hrs  T(C): 36.5 (20 Oct 2021 16:00), Max: 36.5 (20 Oct 2021 08:00)  T(F): 97.7 (20 Oct 2021 16:00), Max: 97.7 (20 Oct 2021 08:00)  HR: 97 (20 Oct 2021 18:00) (97 - 144)  BP: --  BP(mean): --  RR: 19 (20 Oct 2021 18:00) (12 - 32)  SpO2: 97% (20 Oct 2021 18:00) (94% - 100%)    PHYSICAL EXAM:  Constitutional:no acute distress  Eyes:ALONSO, EOMI  Ear/Nose/Throat: no oral lesions, 	  Respiratory: clear BL  Cardiovascular: S1S2  Gastrointestinal:soft, (+) BS, no tenderness  Extremities:no e/e/c  No Lymphadenopathy  IV sites not inflammed.    LABS:                        10.3   14.92 )-----------( 233      ( 20 Oct 2021 01:06 )             31.8     10-20    129<L>  |  92<L>  |  5<L>  ----------------------------<  99  4.3   |  24  |  0.53    Ca    9.6      20 Oct 2021 01:06  Phos  2.4     10-20  Mg     2.4     10-20    TPro  7.9  /  Alb  4.2  /  TBili  0.8  /  DBili  x   /  AST  17  /  ALT  22  /  AlkPhos  192<H>  10-20    PT/INR - ( 20 Oct 2021 01:06 )   PT: 16.3 sec;   INR: 1.38 ratio         PTT - ( 20 Oct 2021 01:06 )  PTT:28.1 sec      MICROBIOLOGY:            RECENT CULTURES:  10-17 @ 21:54  .Blood Blood-Peripheral  --  --  --    No growth to date.  --  10-14 @ 09:38  .Sputum Sputum  Stenotrophomonas maltophilia  Stenotrophomonas maltophilia  CLAU    Moderate Stenotrophomonas maltophilia  Normal Respiratory Bria absent  --  10-14 @ 05:45  .Blood Blood  Serratia marcescens  Serratia marcescens  CLAU    Growth in anaerobic bottle: Serratia marcescens  --      RADIOLOGY & ADDITIONAL STUDIES:    < from: US Extremity Nonvasc Limited, Left (10.20.21 @ 15:02) >  Impression:    No sonographic findings to suggest pseudoaneurysm of the left brachial artery.    Cellulitis of the overlying skin.    < end of copied text >   INFECTIOUS DISEASES FOLLOW UP-- Anitra Prater  [phone]    This is a follow up note for this  66yMale with  Anemia  interval development of cellulitis of left forearm  clearing of serratia bacteremia        ROS:  CONSTITUTIONAL: resting but awake and alert    Allergies    Amiodarone Hydrochloride (Other (Severe))    Intolerances        ANTIBIOTICS/RELEVANT:  antimicrobials  trimethoprim / sulfamethoxazole IVPB 300 milliGRAM(s) IV Intermittent every 6 hours  vancomycin  IVPB 1000 milliGRAM(s) IV Intermittent every 12 hours    immunologic:    OTHER:  albuterol/ipratropium for Nebulization 3 milliLiter(s) Nebulizer every 6 hours  aspirin  chewable 81 milliGRAM(s) Oral daily  chlorhexidine 0.12% Liquid 5 milliLiter(s) Oral Mucosa two times a day  chlorhexidine 0.12% Liquid 15 milliLiter(s) Oral Mucosa every 12 hours  clopidogrel Tablet 75 milliGRAM(s) Oral daily  dextrose 40% Gel 15 Gram(s) Oral once  dextrose 5% + sodium chloride 0.45% with potassium chloride 10 mEq/L 1000 milliLiter(s) IV Continuous <Continuous>  dextrose 50% Injectable 12.5 Gram(s) IV Push once  guaiFENesin  milliGRAM(s) Oral every 12 hours  insulin lispro (ADMELOG) corrective regimen sliding scale   SubCutaneous every 6 hours  methimazole 20 milliGRAM(s) Oral <User Schedule>  metoclopramide Injectable 10 milliGRAM(s) IV Push every 8 hours  mirtazapine 7.5 milliGRAM(s) Oral daily  multivitamin 1 Tablet(s) Oral daily  ondansetron Injectable 8 milliGRAM(s) IV Push every 8 hours  pantoprazole  Injectable 40 milliGRAM(s) IV Push every 12 hours  sertraline 100 milliGRAM(s) Oral daily  simethicone 80 milliGRAM(s) Chew every 8 hours PRN  sodium chloride 0.9%. 1000 milliLiter(s) IV Continuous <Continuous>  thiamine 100 milliGRAM(s) Oral daily      Objective:  Vital Signs Last 24 Hrs  T(C): 36.5 (20 Oct 2021 16:00), Max: 36.5 (20 Oct 2021 08:00)  T(F): 97.7 (20 Oct 2021 16:00), Max: 97.7 (20 Oct 2021 08:00)  HR: 97 (20 Oct 2021 18:00) (97 - 144)  BP: --  BP(mean): --  RR: 19 (20 Oct 2021 18:00) (12 - 32)  SpO2: 97% (20 Oct 2021 18:00) (94% - 100%)    PHYSICAL EXAM:  Constitutional:no acute distress  Eyes:ALONSO, EOMI  Ear/Nose/Throat: no oral lesions, trach	  Respiratory: audible bilat  Cardiovascular: VAD sounds  Gastrointestinal:cholecystotomy tube  Extremities:no e/e/c  No Lymphadenopathy  IV sites not inflammed.    LABS:                        10.3   14.92 )-----------( 233      ( 20 Oct 2021 01:06 )             31.8     10-20    129<L>  |  92<L>  |  5<L>  ----------------------------<  99  4.3   |  24  |  0.53    Ca    9.6      20 Oct 2021 01:06  Phos  2.4     10-20  Mg     2.4     10-20    TPro  7.9  /  Alb  4.2  /  TBili  0.8  /  DBili  x   /  AST  17  /  ALT  22  /  AlkPhos  192<H>  10-20    PT/INR - ( 20 Oct 2021 01:06 )   PT: 16.3 sec;   INR: 1.38 ratio         PTT - ( 20 Oct 2021 01:06 )  PTT:28.1 sec      MICROBIOLOGY:            RECENT CULTURES:  10-17 @ 21:54  .Blood Blood-Peripheral  --  --  --    No growth to date.  --  10-14 @ 09:38  .Sputum Sputum  Stenotrophomonas maltophilia  Stenotrophomonas maltophilia  CLAU    Moderate Stenotrophomonas maltophilia  Normal Respiratory Bria absent  --  10-14 @ 05:45  .Blood Blood  Serratia marcescens  Serratia marcescens  CLAU    Growth in anaerobic bottle: Serratia marcescens  --      RADIOLOGY & ADDITIONAL STUDIES:    < from: US Extremity Nonvasc Limited, Left (10.20.21 @ 15:02) >  Impression:    No sonographic findings to suggest pseudoaneurysm of the left brachial artery.    Cellulitis of the overlying skin.    < end of copied text >

## 2021-10-20 NOTE — PROGRESS NOTE ADULT - PROBLEM SELECTOR PLAN 4
requires assistance with all ADLs, PPSV2 10%  deconditioned, will benefit from therapy once stable. S/p LVAD.  -Treatment in CTICU  -Followed by HF team

## 2021-10-20 NOTE — PROGRESS NOTE ADULT - ASSESSMENT
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 coli UTIs, driveline drainage (1/7/21) and COVID-19, here initially for GIB prolonged hospital course, s/p tracheostomy, acalculous cholecystitis s/p perc dawn. Patient has persistent intermittent abdominal pain, CTA showing possible proximal SMA stenosis. CTA poor quality limited by significant streak artifact. Mesenteric duplex velocities without evidence of significant stenosis, however study unreliable in the setting of patient's augmented systolic function given LVAD. Now s/p diagnostic mesenteric angiogram with unsuccessful SMA stenting. 09/12 CTA obtained due to downtrending H/H with evidence of L RP hematoma without evidence of active extravasation, L groin wound stable. H/H stable. Vascular re-consulted for potential attempt at second intervention given improved patient condition. S/p 10/18 mesenteric angiogram with x2 stents, with hematoma at brachial cutdown site    Plan:  - abdominal exam stable from preop  - Patient has palpable hematoma in LUE at cutdown site,     Vascular Surgery  [id number]   65M PMH ACC/AHA stage D HF due to NICM HM2 LVAD , TV annuloplasty ring 9/12/17 as destination therapy due to severe peripheral artery disease with significant stenosis  SIADH, Depression, CKD-3 with hyperkalemia, past E. coli UTIs, driveline drainage (1/7/21) and COVID-19, here initially for GIB prolonged hospital course, s/p tracheostomy, acalculous cholecystitis s/p perc dawn. Patient has persistent intermittent abdominal pain, CTA showing possible proximal SMA stenosis. CTA poor quality limited by significant streak artifact. Mesenteric duplex velocities without evidence of significant stenosis, however study unreliable in the setting of patient's augmented systolic function given LVAD. Now s/p diagnostic mesenteric angiogram with unsuccessful SMA stenting. 09/12 CTA obtained due to downtrending H/H with evidence of L RP hematoma without evidence of active extravasation, L groin wound stable. H/H stable. Vascular re-consulted for potential attempt at second intervention given improved patient condition. S/p 10/18 mesenteric angiogram with x2 stents, with hematoma at brachial cutdown site    Plan:  - abdominal exam stable from preop  - Patient has palpable hematoma in LUE at cutdown site, recommend **    Vascular Surgery  [id number]   65M PMH ACC/AHA stage D HF due to NICM HM2 LVAD , TV annuloplasty ring 9/12/17 as destination therapy due to severe peripheral artery disease with significant stenosis  SIADH, Depression, CKD-3 with hyperkalemia, past E. coli UTIs, driveline drainage (1/7/21) and COVID-19, here initially for GIB prolonged hospital course, s/p tracheostomy, acalculous cholecystitis s/p perc dawn. Patient has persistent intermittent abdominal pain, CTA showing possible proximal SMA stenosis. CTA poor quality limited by significant streak artifact. Mesenteric duplex velocities without evidence of significant stenosis, however study unreliable in the setting of patient's augmented systolic function given LVAD. Now s/p diagnostic mesenteric angiogram with unsuccessful SMA stenting. 09/12 CTA obtained due to downtrending H/H with evidence of L RP hematoma without evidence of active extravasation, L groin wound stable. H/H stable. Vascular re-consulted for potential attempt at second intervention given improved patient condition. S/p 10/18 mesenteric angiogram with x2 stents, with hematoma at brachial cutdown site    Plan:  - abdominal exam stable from preop  - Patient has palpable hematoma in LUE at cutdown site, recommend [facility] US LUE arterial at brachial access site to eval for bleed    Vascular Surgery  [id number]   65M PMH ACC/AHA stage D HF due to NICM HM2 LVAD , TV annuloplasty ring 9/12/17 as destination therapy due to severe peripheral artery disease with significant stenosis  SIADH, Depression, CKD-3 with hyperkalemia, past E. coli UTIs, driveline drainage (1/7/21) and COVID-19, here initially for GIB prolonged hospital course, s/p tracheostomy, acalculous cholecystitis s/p perc dawn. Patient has persistent intermittent abdominal pain, CTA showing possible proximal SMA stenosis. CTA poor quality limited by significant streak artifact. Mesenteric duplex velocities without evidence of significant stenosis, however study unreliable in the setting of patient's augmented systolic function given LVAD. Now s/p diagnostic mesenteric angiogram with unsuccessful SMA stenting. 09/12 CTA obtained due to downtrending H/H with evidence of L RP hematoma without evidence of active extravasation, L groin wound stable. H/H stable. Vascular re-consulted for potential attempt at second intervention given improved patient condition. S/p 10/18 mesenteric angiogram with x2 stents, with hematoma at brachial cutdown site    Plan:  - abdominal exam stable from preop  - Patient has palpable hematoma in LUE at cutdown site, recommend US LUE arterial at brachial access site to eval for bleed    Vascular Surgery  [id number]

## 2021-10-20 NOTE — PROGRESS NOTE ADULT - ASSESSMENT
Assessment and Recommendation:   · Assessment	  Assessment and recommendation :  Recurrent Acute hypoxic respiratory Failure  FI02 is 40% S/P tracheostomy  on track. collar   Acute blood loss anemia S/P multiple  blood transfusion post tracheostomy   recurrent  septic shock  weaned off  vasopressin   hemorrhagic shock receiving 2 unit of blood transfusion   pan culture, fever  on IV meropenem  ,IV vancomycin and  po vancomycin   S/P cholecystostomy tube placement by IR    patient is S/P  repeat vacular procedure and SMA stent x2 complicated with LUE hematoma   AF RVR back to regular sinus Rhythm   Non ischemic cardiomyopathy continue ACE inhibitor and B-Blockers   mesenteric ischemia failure to stent SMA , no plan for repeated trial   S/P 3 unit of blood transfusion   for vascular study of mesenteric artery in AM   INR is very high repeat , no evidence of bleeding   Stage D systolic heart failure S/P LVAD HM2   MH2 LVAD  with  TV Annuloplasty  Severe peripheral vascular disease   severe hyperglycemia on insulin coverage    Reglan 10 mg for Gastric Motility   hyperthyroidism on Methimazole 10mg TID   critical care polyneuropathy   Anemia of Acute blood Loss   severe protein caloric malnutrition   magnesium supplement keep above 2   potasium supplement   Chronic kidney disease stage III  Depressed and withdrawn   resume NG tube feeding   GI prophylaxis with PPI   Discussed with TCV team

## 2021-10-20 NOTE — PROGRESS NOTE ADULT - PROBLEM SELECTOR PLAN 2
- Stable w/o evidence of LVAD malfunction, settings as above  - H/o intolerability to AC or ECASA due to recurrent GI bleeding  - Trend daily LDH, currently stable. - Stable w/o evidence of LVAD malfunction, settings as above  - H/o intolerability to AC or ECASA due to recurrent GI bleeding  - Trend daily LDH, currently stable.  - will require weekly family meetings to discuss GOC

## 2021-10-20 NOTE — PROGRESS NOTE ADULT - PROBLEM SELECTOR PLAN 4
- S/p trach 10/4 by ENT, consent given by HCP sister. - S/p trach 10/4 by ENT, consent given by HCP sister.  - currently tolerating trach collar - currently holding tube feeds as patient continues to have episodes of nausea and vomiting  - Will reassess NG tube distal tip location, goal is to have post pyloric placement
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
## 2021-10-20 NOTE — PROGRESS NOTE ADULT - PROBLEM SELECTOR PLAN 6
Following for GOC given complex hospital course.  - Awaiting multidisciplinary team meeting. attempted to contact vascular surgery without success.  -Palliative availability is 10am-3:30pm, discussed with CTU team. During last GOC meeting, goals were for tying SMA stents hoping that was going to improve pain and capacity to provide tube feeds so he was able to move forward with his care and leave the hospital. However, due to recurrent symptoms will have a f/u FM on 10/21 at 10:30am.

## 2021-10-20 NOTE — PROGRESS NOTE ADULT - PROBLEM SELECTOR PLAN 8
- Now improved, likely medication induced  - Daily CBC. - Has been transfused multiple times throughout this admission, last transfusion was on 10/5  - CBC daily.

## 2021-10-20 NOTE — PROGRESS NOTE ADULT - ASSESSMENT
65 YO M with a history of stage D NICM s/p HM2 on 9/2017 as DT (due to severe PAD) with TV ring, prior COVID-19 infection 4/2020, recurrent syncope post LVAD s/p ILR, and chronic abdominal pain with prior negative workup who was admitted 6/14/21 with symptomatic anemia with Hgb 4.5 in setting of INR 8.8 without hemodynamic instability and has since had a protracted hospitalization. He was transfused and underwent VCE which showed active bleeding in the mid small bowel but subsequent enteroscopy 6/15 did not reveal any active bleeding. He acutely decompensated after procedure with fever/hypertension, low flow alarms, and pulmonary infiltrate with hypoxia requiring intubation from probable aspiration PNA. He was unable to extubated and has since undergone tracheostomy but tolerating persistent trach collar and nearing ability for decannulation. 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 He underwent angiogram on 9/10, stent was unable to be placed and course was then complicated by RP bleed. He continued to have persistent leukocytosis and febrile episodes and was noted to have positive sputum culture for serratia marcescens and completed his course of abx. He was initially decannulated on 9/23. Recently he started having multiples of melena, emesis and went into acte respiratory distress and was ultimately re-intubated on 9/26.     He had blood cultures are positive for enterobacter cloacae/serratia, remains on IV antibiotics. He is s/p trach with ENT on 10/4. Sputum cultures positive for stenotrophomonas and blood cultures positive for serratia on bactrim. S/p SMA stent x 2 10/18.  67 YO M with a history of stage D NICM s/p HM2 on 9/2017 as DT (due to severe PAD) with TV ring, prior COVID-19 infection 4/2020, recurrent syncope post LVAD s/p ILR, and chronic abdominal pain with prior negative workup who was admitted 6/14/21 with symptomatic anemia with Hgb 4.5 in setting of INR 8.8 without hemodynamic instability and has since had a protracted hospitalization. He was transfused and underwent VCE which showed active bleeding in the mid small bowel but subsequent enteroscopy 6/15 did not reveal any active bleeding. He acutely decompensated after procedure with fever/hypertension, low flow alarms, and pulmonary infiltrate with hypoxia requiring intubation from probable aspiration PNA. He was unable to extubated and has since undergone tracheostomy but tolerating persistent trach collar and nearing ability for decannulation. 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 He underwent angiogram on 9/10, stent was unable to be placed and course was then complicated by RP bleed. He continued to have persistent leukocytosis and febrile episodes and was noted to have positive sputum culture for serratia marcescens and completed his course of abx. He was initially decannulated on 9/23. Recently he started having multiples of melena, emesis and went into acte respiratory distress and was ultimately re-intubated on 9/26.     He had blood cultures are positive for enterobacter cloacae/serratia, remains on IV antibiotics. He is s/p trach with ENT on 10/4. Sputum cultures positive for stenotrophomonas and blood cultures positive for serratia on bactrim. S/p SMA stent x 2 10/18.   Early evidence of clearing of serratia bacteremia    Morris Prater MD  [phone]  After 5pm/weekends [phone]

## 2021-10-20 NOTE — PROGRESS NOTE ADULT - ATTENDING COMMENTS
Presented nausea/?vomiting yesterday. Feedings held.   Reports diffuse abdominal discomfort. Abdomen is soft.   Should confirm NG tube location, would prefer post pyloric. May need GI eval, ?upper GI series.  No acute cardiac issues.

## 2021-10-20 NOTE — PROGRESS NOTE ADULT - SUBJECTIVE AND OBJECTIVE BOX
RICKY HAMPTON  MRN-[id number]  Patient is a 66y old  Male who presents with a chief complaint of Anemia, Supratherapeutic INR, Dark Stools (20 Oct 2021 07:13)    HPI:  65M PMH ACC/AHA stage D HF due to NICM HM2 LVAD , TV annuloplasty ring 17 as destination therapy due to severe peripheral artery disease with significant stenosis  SIADH, Depression, CKD-3 with hyperkalemia, past E. coli UTIs, driveline drainage (21) and COVID-19 (back in 2020)  He was recently seen in clinic where he complained of abdominal pain and dark stools w constipation back in May. He presents to [facility] ER today weakness and fatigue, moderate and + Black stools for three days, on coumadin secondary to warfarin use in the setting of an LVAD. Patient has required transfusions for GIB in the past. Mostly recently back in 2021 pt had anemia with dark stools. No interventions was done at that time. However Last Endoscopy was done in 2020 (negative). Today labs show patient is anemic with H/H of 4.5/16.3,. INR is 8.84 MAP in the 90s, Temp 35.1. He denies any chest pain, shortness of breath, dizziness, abd pain, nausea or vomiting.       (2021 16:57)      Surgery/Hospital Course:   admit for melena w/ anemia, INR 8.84   6/15 Capsul study (+) for small bowel bleed, balloon endoscopy (old blood in prox ileum); post EGD - septic w/ L opacity, re-intubated for concern for aspiration, TTE (Mod MR, decrease biV w/ interventricular septum boweing towards R)   bronch    +C Diff    CT C/A/P: Fluid filled colon which may be 2/2 rapid transit. Small bilateral pleffs with associates. Compressive atelectasis New ISABELLE & LLL  parenchymal opacities, suspicious for pneumonia. Moderate stenosis in the proximal superior mesenteric artery.    #8 Shiley trach at bedside    LVAD speed increased to 9200   Bronch   TC since . Patient transferred to SDU.    INR today 2.64.  H&H 7.3/24 this AM.  Will repeat CBC at noon, and will send stool guaiac Patient with persistent abdominal tenderness, rate of tube feeds decreased.  No nausea/vomiting.     INR today 2.4. H&H 9.1/28.6 low flow overnight /N&V, refusing Tube feeds on D5 1/2 normal  @50 cc/hr   INR 2.69  H&H 7.7/.1 refusing Tube feeds on D5 1/2 normal  @50 cc/hr. This am + BM Melena Dr Oneill HF  aware- PRBC x1  GI team consulted -  NPO  plan on study in am-  D/w Dr Cadet Patient  to return to CTU for further management; 1PRBCS    Post op INR 2.2 today.  No bleeding. BC + for SM.  Pt is hypotensive requiring pressor and inotropic support.  ID follow up today on Cefepime will follow.   R PTC for PTX    CT C/A/P: sub q emphysema in R chest wall, GGO RUL, small ascites CTH negative; Abd US: GB thickening, pericholecystic fluid     Perchole drain in place continues to drain total output overnight 133.  Fever today 38.8    duplex LE negative    Patient with persistent abdominal pain, refusing tube feeds and medications, Psych consulted   CTA A/P ordered to r/o mesenteric ischemia 2/2 persistent anorexia, nausea, vomiting. Revealed:  Evaluation of the mesenteric vessels is limited by streak artifact from LVAD. There appears to be severe stenosis of the proximal SMA; abdominal mesenteric doppler is recommended for further evaluation. 2.  No small bowel findings to suggest acute mesenteric ischemia. 3.  Focal dissections involving the right and left common iliac arteries.  8/15: Cultures resulted BC 1/2 +GPC in clusters, SC enterobacter; mesenteric duplex: borderline stenosis of proximal SMA  : CT C/A/P noncon: Nondular opacities in R lung apex w/cavitation, abd nl  :  Continue current care, treatment of thyrotoxicosis with medications as per endocrine, d/c ABX as per team.    RUQ sono: Contracted gallbladder with cholecystostomy tube in place.  9/10 failed SMA stent, c/b RP bleed s/p 3U PRCB   CTA Abd/Pelvis L RP hematoma    Trach decannulated    intubated fro resp distress for increased WOB, LL pneumonia; CT C/A/P: progressive ISABELLE opacities and L consolidations, multifocal pneumonia    TTE (EF 25%, decreased RV, mod MR)   10/3 VT -> Lidocaine gtt   10/4 Trach size 6 DCT cuff  10/5 CT abd (neg for intra-abd abcess, severe stenosis of prox SMA and b/l iliac arteries)  10/18 SMA Stent     Today:    REVIEW OF SYSTEMS:  Unable to obtain, pt's trach.     ICU Vital Signs Last 24 Hrs  T(C): 36.1 (20 Oct 2021 04:00), Max: 36.5 (19 Oct 2021 16:00)  T(F): 97 (20 Oct 2021 04:00), Max: 97.7 (19 Oct 2021 16:00)  HR: 112 (20 Oct 2021 06:30) (82 - 144)  BP: --  BP(mean): --  ABP: 106/67 (20 Oct 2021 06:30) (84/61 - 124/64)  ABP(mean): 82 (20 Oct 2021 06:30) (70 - 102)  RR: 20 (20 Oct 2021 06:30) (8 - 32)  SpO2: 100% (20 Oct 2021 06:30) (94% - 100%)      Physical Exam:  Gen:  NAD  CNS: moves all extremities to command   Neck: no JVD, +trach, +central line   RES : coarse breath sounds, no wheezing    CVS: +LVAD hum   Abd: Soft. Syibl drain with bilious fluid. Positive BS throughout. Mild RUQ abdominal tenderness by perc sybil.  Skin: No rash, erythema, cyanosis.  Vasc: Warm and well-perfused.  Ext:  no edema      ============================I/O===========================   I&O's Detail    19 Oct 2021 07:01  -  20 Oct 2021 07:00  --------------------------------------------------------  IN:    dextrose 5% + sodium chloride 0.45% w/ Additives: 720 mL    IV PiggyBack: 1486 mL    Miscellaneous Tube Feedin mL  Total IN: 2326 mL    OUT:    Dexmedetomidine: 0 mL    Drain (mL): 195 mL    Emesis (mL): 50 mL    Voided (mL): 2800 mL  Total OUT: 3045 mL    Total NET: -719 mL        ============================ LABS =========================                        10.3   14.92 )-----------( 233      ( 20 Oct 2021 01:06 )             31.8     10-20    129<L>  |  92<L>  |  5<L>  ----------------------------<  99  4.3   |  24  |  0.53    Ca    9.6      20 Oct 2021 01:06  Phos  2.4     10-20  Mg     2.4     10-20    TPro  7.9  /  Alb  4.2  /  TBili  0.8  /  DBili  x   /  AST  17  /  ALT  22  /  AlkPhos  192<H>  10-20    LIVER FUNCTIONS - ( 20 Oct 2021 01:06 )  Alb: 4.2 g/dL / Pro: 7.9 g/dL / ALK PHOS: 192 U/L / ALT: 22 U/L / AST: 17 U/L / GGT: x           PT/INR - ( 20 Oct 2021 01:06 )   PT: 16.3 sec;   INR: 1.38 ratio         PTT - ( 20 Oct 2021 01:06 )  PTT:28.1 sec  ABG - ( 20 Oct 2021 01:00 )  pH, Arterial: 7.41  pH, Blood: x     /  pCO2: 45    /  pO2: 162   / HCO3: 28    / Base Excess: 3.3   /  SaO2: 100.0         ======================Micro/Rad/Cardio=================  Culture: Reviewed   CXR: Reviewed  Echo:Reviewed  ======================================================  PAST MEDICAL & SURGICAL HISTORY:  CHF (congestive heart failure)    CAD (coronary artery disease)    Depression    Pleural effusion    History of  novel coronavirus disease (COVID-19)  2020    Hemorrhoids    Bleeding hemorrhoids    Peripheral arterial disease    Claudication    BPH with urinary obstruction    ACC/AHA stage D systolic heart failure    Anticoagulation goal of INR 2.0 to 2.5    Falls    Clavicle fracture    CKD (chronic kidney disease), stage III    Iron deficiency anemia    H/O epistaxis    Vertigo    GI bleed    S/P TVR (tricuspid valve repair)    S/P ventricular assist device    S/P endoscopy      ====================ASSESSMENT ==============  S/p SMA Stent on 10/18   Stage D Nonischemic Cardiomyopathy, Status Post HM2 on 2017    Cardiogenic shock  Hemodynamic instability   Acute hypoxemic respiratory failure s/p trach , decannulated on ; Reintubated on    GI bleed , Status Post Enteroscopy   Anemia, in setting of melena   Chronic Kidney Disease  Stress hyperglycemia   C.diff positive on    Hypovolemic shock  Septic shock  Leukocytosis  GB thickening/percholecystic s/p perc sybil by IR   SMA stenosis  Serratia/citrobacter pneumonia   Stenotrophomonas pneumonia   Enterobacter pneumonia   Nasuea/vomiting  Deconditioning     Plan:  ====================== NEUROLOGY=====================  Continue to monitor neuro status per ICU protocol.   C/w sertraline and mirtazapine for depression     mirtazapine 7.5 milliGRAM(s) Oral daily  sertraline 100 milliGRAM(s) Oral daily  ==================== RESPIRATORY======================  Acute hypoxemic respiratory failure s/p #8 shiley trach on ; decannulated on ; Reintubated on ; trach size 6 DCT cuff on 10/4   Continue close monitoring of respiratory rate and breathing pattern, following of ABG’s with A-line monitoring, continuous pulse oximetry monitoring.   Continue TC as tolerated     Mechanical Ventilation:  Mode: Vent off  FiO2: 40  ====================CARDIOVASCULAR==================  Stage D Nonischemic Cardiomyopathy, Status Post HM2 on 2017; LVAD settings 9200, flow 4.6  TTE 10/4:  Severely decreased LV systolic function, Diffuse hypokinesis. Mild AR. No pericardial effusion   VT on 10/4, s/p Lidocaine drip, continue close tele monitoring   ASA for recent SMA stent     aspirin  chewable 81 milliGRAM(s) Oral daily  ===================HEMATOLOGIC/ONC ===================  CTA A/P on  +L RP hematoma   Acute blood loss anemia, monitor H&H/Plts   Continue Plavix as per Vascular recs     clopidogrel Tablet 75 milliGRAM(s) Oral daily  ===================== RENAL =========================  Hx of CKD, continue monitoring I&Os, BUN/Cr, and urine output   Replete lytes PRN. Keep K> 4 and Mg >2  ==================== GASTROINTESTINAL===================  TFs held in setting of ongoing nausea / recent emesis   CT A/P 10/5 neg for intra-abd abcess, severe stenosis of prox SMA and b/l iliac arteries  CTA A/P : Evaluation of the mesenteric vessels is limited by streak artifact from LVAD. There appears to be severe stenosis of the proximal SMA; abdominal mesenteric doppler is recommended for further evaluation. 2.  No small bowel findings to suggest acute mesenteric ischemia. 3.  Focal dissections involving the right and left common iliac arteries.  Stenosis of proximal SMA, s/p failed SMA stent c/b RP bleed on 9/10 - SMA stent with Vascular 10/18   Simethicone PRN for gas   Reglan for gut motility  Zofran for nausea     dextrose 5% + sodium chloride 0.45% with potassium chloride 10 mEq/L 1000 milliLiter(s) (30 mL/Hr) IV Continuous <Continuous>  multivitamin 1 Tablet(s) Oral daily  pantoprazole  Injectable 40 milliGRAM(s) IV Push every 12 hours  simethicone 80 milliGRAM(s) Chew every 8 hours PRN Gas  sodium chloride 0.9%. 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   CT C/A/P : progressive ISABELLE opacities and L consolidations, multifocal pneumonia  BCx 1/2 on 10/1 +Enterobacter cloacae complex, BCx  10/14+ Serratia marcescens   SCX on 10/14 +Stenotrophomonas maltophilia, c/w Bactrim and Vancomycin     trimethoprim / sulfamethoxazole IVPB 300 milliGRAM(s) IV Intermittent every 6 hours  vancomycin  IVPB 1000 milliGRAM(s) IV Intermittent every 12 hours      Patient requires continuous monitoring with bedside rhythm monitoring, pulse ox monitoring, and intermittent blood gas analysis. Care plan discussed with ICU care team. Patient remained critical and at risk for life threatening decompensation.     By signing my name below, IJanina Tiffany, attest that this documentation has been prepared under the direction and in the presence of CLAUDIA Gale   Electronically signed: Emily Roa Scribe, 10-20-21 @ 08:09    IAleksandra PA  , personally performed the services described in this documentation. all medical record entries made by the luisibmel were at my direction and in my presence. I have reviewed the chart and agree that the record reflects my personal performance and is accurate and complete  Electronically signed: CLAUDIA Gale      RICKY HAMPTON  MRN-[id number]  Patient is a 66y old  Male who presents with a chief complaint of Anemia, Supratherapeutic INR, Dark Stools (20 Oct 2021 07:13)    HPI:  65M PMH ACC/AHA stage D HF due to NICM HM2 LVAD , TV annuloplasty ring 17 as destination therapy due to severe peripheral artery disease with significant stenosis  SIADH, Depression, CKD-3 with hyperkalemia, past E. coli UTIs, driveline drainage (21) and COVID-19 (back in 2020)  He was recently seen in clinic where he complained of abdominal pain and dark stools w constipation back in May. He presents to [facility] ER today weakness and fatigue, moderate and + Black stools for three days, on coumadin secondary to warfarin use in the setting of an LVAD. Patient has required transfusions for GIB in the past. Mostly recently back in 2021 pt had anemia with dark stools. No interventions was done at that time. However Last Endoscopy was done in 2020 (negative). Today labs show patient is anemic with H/H of 4.5/16.3,. INR is 8.84 MAP in the 90s, Temp 35.1. He denies any chest pain, shortness of breath, dizziness, abd pain, nausea or vomiting.       (2021 16:57)      Surgery/Hospital Course:   admit for melena w/ anemia, INR 8.84   6/15 Capsul study (+) for small bowel bleed, balloon endoscopy (old blood in prox ileum); post EGD - septic w/ L opacity, re-intubated for concern for aspiration, TTE (Mod MR, decrease biV w/ interventricular septum boweing towards R)   bronch    +C Diff    CT C/A/P: Fluid filled colon which may be 2/2 rapid transit. Small bilateral pleffs with associates. Compressive atelectasis New ISABELLE & LLL  parenchymal opacities, suspicious for pneumonia. Moderate stenosis in the proximal superior mesenteric artery.    #8 Shiley trach at bedside    LVAD speed increased to 9200   Bronch   TC since . Patient transferred to SDU.    INR today 2.64.  H&H 7.3/24 this AM.  Will repeat CBC at noon, and will send stool guaiac Patient with persistent abdominal tenderness, rate of tube feeds decreased.  No nausea/vomiting.     INR today 2.4. H&H 9.1/28.6 low flow overnight /N&V, refusing Tube feeds on D5 1/2 normal  @50 cc/hr   INR 2.69  H&H 7.7/.1 refusing Tube feeds on D5 1/2 normal  @50 cc/hr. This am + BM Melena Dr Oneill HF  aware- PRBC x1  GI team consulted -  NPO  plan on study in am-  D/w Dr Cadet Patient  to return to CTU for further management; 1PRBCS    Post op INR 2.2 today.  No bleeding. BC + for SM.  Pt is hypotensive requiring pressor and inotropic support.  ID follow up today on Cefepime will follow.   R PTC for PTX    CT C/A/P: sub q emphysema in R chest wall, GGO RUL, small ascites CTH negative; Abd US: GB thickening, pericholecystic fluid     Perchole drain in place continues to drain total output overnight 133.  Fever today 38.8    duplex LE negative    Patient with persistent abdominal pain, refusing tube feeds and medications, Psych consulted   CTA A/P ordered to r/o mesenteric ischemia 2/2 persistent anorexia, nausea, vomiting. Revealed:  Evaluation of the mesenteric vessels is limited by streak artifact from LVAD. There appears to be severe stenosis of the proximal SMA; abdominal mesenteric doppler is recommended for further evaluation. 2.  No small bowel findings to suggest acute mesenteric ischemia. 3.  Focal dissections involving the right and left common iliac arteries.  8/15: Cultures resulted BC 1/2 +GPC in clusters, SC enterobacter; mesenteric duplex: borderline stenosis of proximal SMA  : CT C/A/P noncon: Nondular opacities in R lung apex w/cavitation, abd nl  :  Continue current care, treatment of thyrotoxicosis with medications as per endocrine, d/c ABX as per team.    RUQ sono: Contracted gallbladder with cholecystostomy tube in place.  9/10 failed SMA stent, c/b RP bleed s/p 3U PRCB   CTA Abd/Pelvis L RP hematoma    Trach decannulated    intubated fro resp distress for increased WOB, LL pneumonia; CT C/A/P: progressive ISABELLE opacities and L consolidations, multifocal pneumonia    TTE (EF 25%, decreased RV, mod MR)   10/3 VT -> Lidocaine gtt   10/4 Trach size 6 DCT cuff  10/5 CT abd (neg for intra-abd abcess, severe stenosis of prox SMA and b/l iliac arteries)  10/18 SMA Stent     Today:  - Has been Trach collaring since 6AM  - Tube feeds currently on hold due to persistent abdominal pain, and vomiting.   - Will obtain XR to observe if Tube feeds is post pyloric.   - Vascular recommending US of LUE arterial at brachial access site due to hematoma.     REVIEW OF SYSTEMS:  Gen: No fever  EYES/ENT: No visual changes;  No vertigo or throat pain   NECK: No pain   RES:  No shortness of breath or Cough  CARD: No chest pain   GI: +intermittently c/o abd pain and nausea   : No dysuria  NEURO: No weakness  SKIN: No itching, rashes    ICU Vital Signs Last 24 Hrs  T(C): 36.1 (20 Oct 2021 04:00), Max: 36.5 (19 Oct 2021 16:00)  T(F): 97 (20 Oct 2021 04:00), Max: 97.7 (19 Oct 2021 16:00)  HR: 112 (20 Oct 2021 06:30) (82 - 144)  BP: --  BP(mean): --  ABP: 106/67 (20 Oct 2021 06:30) (84/61 - 124/64)  ABP(mean): 82 (20 Oct 2021 06:30) (70 - 102)  RR: 20 (20 Oct 2021 06:30) (8 - 32)  SpO2: 100% (20 Oct 2021 06:30) (94% - 100%)      Physical Exam:  Gen:  NAD  CNS: moves all extremities to command   Neck: no JVD, +trach, +central line   RES : coarse breath sounds, no wheezing    CVS: +LVAD hum   Abd: Soft. Sybil drain with bilious fluid. Positive BS throughout. Mild RUQ abdominal tenderness by perc sybil.  Skin: No rash, erythema, cyanosis.  Vasc: Warm and well-perfused.  Ext:  no edema      ============================I/O===========================   I&O's Detail    19 Oct 2021 07:01  -  20 Oct 2021 07:00  --------------------------------------------------------  IN:    dextrose 5% + sodium chloride 0.45% w/ Additives: 720 mL    IV PiggyBack: 1486 mL    Miscellaneous Tube Feedin mL  Total IN: 2326 mL    OUT:    Dexmedetomidine: 0 mL    Drain (mL): 195 mL    Emesis (mL): 50 mL    Voided (mL): 2800 mL  Total OUT: 3045 mL    Total NET: -719 mL        ============================ LABS =========================                        10.3   14.92 )-----------( 233      ( 20 Oct 2021 01:06 )             31.8     10-20    129<L>  |  92<L>  |  5<L>  ----------------------------<  99  4.3   |  24  |  0.53    Ca    9.6      20 Oct 2021 01:06  Phos  2.4     10-20  Mg     2.4     10-20    TPro  7.9  /  Alb  4.2  /  TBili  0.8  /  DBili  x   /  AST  17  /  ALT  22  /  AlkPhos  192<H>  10-20    LIVER FUNCTIONS - ( 20 Oct 2021 01:06 )  Alb: 4.2 g/dL / Pro: 7.9 g/dL / ALK PHOS: 192 U/L / ALT: 22 U/L / AST: 17 U/L / GGT: x           PT/INR - ( 20 Oct 2021 01:06 )   PT: 16.3 sec;   INR: 1.38 ratio         PTT - ( 20 Oct 2021 01:06 )  PTT:28.1 sec  ABG - ( 20 Oct 2021 01:00 )  pH, Arterial: 7.41  pH, Blood: x     /  pCO2: 45    /  pO2: 162   / HCO3: 28    / Base Excess: 3.3   /  SaO2: 100.0         ======================Micro/Rad/Cardio=================  Culture: Reviewed   CXR: Reviewed  Echo:Reviewed  ======================================================  PAST MEDICAL & SURGICAL HISTORY:  CHF (congestive heart failure)    CAD (coronary artery disease)    Depression    Pleural effusion    History of  novel coronavirus disease (COVID-19)  2020    Hemorrhoids    Bleeding hemorrhoids    Peripheral arterial disease    Claudication    BPH with urinary obstruction    ACC/AHA stage D systolic heart failure    Anticoagulation goal of INR 2.0 to 2.5    Falls    Clavicle fracture    CKD (chronic kidney disease), stage III    Iron deficiency anemia    H/O epistaxis    Vertigo    GI bleed    S/P TVR (tricuspid valve repair)    S/P ventricular assist device    S/P endoscopy      ====================ASSESSMENT ==============  S/p SMA Stent on 10/18   Stage D Nonischemic Cardiomyopathy, Status Post HM2 on 2017    Cardiogenic shock  Hemodynamic instability   Acute hypoxemic respiratory failure s/p trach , decannulated on ; Reintubated on    GI bleed , Status Post Enteroscopy   Anemia, in setting of melena   Chronic Kidney Disease  Stress hyperglycemia   C.diff positive on    Hypovolemic shock  Septic shock  Leukocytosis  GB thickening/percholecystic s/p perc sybil by IR   SMA stenosis  Serratia/citrobacter pneumonia   Stenotrophomonas pneumonia   Enterobacter pneumonia   Nasuea/vomiting  Deconditioning     Plan:  ====================== NEUROLOGY=====================  Continue to monitor neuro status per ICU protocol.   C/w sertraline and mirtazapine for depression     mirtazapine 7.5 milliGRAM(s) Oral daily  sertraline 100 milliGRAM(s) Oral daily  ==================== RESPIRATORY======================  Acute hypoxemic respiratory failure s/p #8 shiley trach on ; decannulated on ; Reintubated on ; trach size 6 DCT cuff on 10/4   Continue close monitoring of respiratory rate and breathing pattern, following of ABG’s with A-line monitoring, continuous pulse oximetry monitoring.   Has been Trach collaring since 6AM    Mechanical Ventilation:  Mode: Vent off  FiO2: 40  ====================CARDIOVASCULAR==================  Stage D Nonischemic Cardiomyopathy, Status Post HM2 on 2017; LVAD settings 9200, flow 4.6  TTE 10/4:  Severely decreased LV systolic function, Diffuse hypokinesis. Mild AR. No pericardial effusion   VT on 10/4, s/p Lidocaine drip, continue close tele monitoring   ASA for recent SMA stent     aspirin  chewable 81 milliGRAM(s) Oral daily  ===================HEMATOLOGIC/ONC ===================  CTA A/P on  +L RP hematoma   Acute blood loss anemia, monitor H&H/Plts   Continue Plavix as per Vascular recs   Vascular recommending US of LUE arterial at brachial access site due to hematoma.     clopidogrel Tablet 75 milliGRAM(s) Oral daily  ===================== RENAL =========================  Hx of CKD, continue monitoring I&Os, BUN/Cr, and urine output   Replete lytes PRN. Keep K> 4 and Mg >2  ==================== GASTROINTESTINAL===================  TFs held in setting of ongoing nausea / recent emesis   CT A/P 10/5 neg for intra-abd abcess, severe stenosis of prox SMA and b/l iliac arteries  CTA A/P : Evaluation of the mesenteric vessels is limited by streak artifact from LVAD. There appears to be severe stenosis of the proximal SMA; abdominal mesenteric doppler is recommended for further evaluation. 2.  No small bowel findings to suggest acute mesenteric ischemia. 3.  Focal dissections involving the right and left common iliac arteries.  Stenosis of proximal SMA, s/p failed SMA stent c/b RP bleed on 9/10 - SMA stent with Vascular 10/18   Simethicone PRN for gas   Reglan for gut motility  Zofran for nausea   Tube feeds currently on hold due to persistent abdominal pain, and vomiting.   Will obtain XR to observe if Tube feeds is post pyloric.     dextrose 5% + sodium chloride 0.45% with potassium chloride 10 mEq/L 1000 milliLiter(s) (30 mL/Hr) IV Continuous <Continuous>  multivitamin 1 Tablet(s) Oral daily  pantoprazole  Injectable 40 milliGRAM(s) IV Push every 12 hours  simethicone 80 milliGRAM(s) Chew every 8 hours PRN Gas  sodium chloride 0.9%. 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   CT C/A/P : progressive ISABELLE opacities and L consolidations, multifocal pneumonia  BCx 1/2 on 10/1 +Enterobacter cloacae complex, BCx  10/14+ Serratia marcescens   SCX on 10/14 +Stenotrophomonas maltophilia, c/w Bactrim and Vancomycin     trimethoprim / sulfamethoxazole IVPB 300 milliGRAM(s) IV Intermittent every 6 hours  vancomycin  IVPB 1000 milliGRAM(s) IV Intermittent every 12 hours      Patient requires continuous monitoring with bedside rhythm monitoring, pulse ox monitoring, and intermittent blood gas analysis. Care plan discussed with ICU care team. Patient remained critical and at risk for life threatening decompensation.     By signing my name below, IJanina Tiffany, attest that this documentation has been prepared under the direction and in the presence of CLAUDIA Gale   Electronically signed: Emily Roa Scribe, 10-20-21 @ 08:09    IAleksandra PA  , personally performed the services described in this documentation. all medical record entries made by the luisibmel were at my direction and in my presence. I have reviewed the chart and agree that the record reflects my personal performance and is accurate and complete  Electronically signed: CLAUDIA Gale      RICKY HAMPTON  MRN-[id number]  Patient is a 66y old  Male who presents with a chief complaint of Anemia, Supratherapeutic INR, Dark Stools (20 Oct 2021 07:13)    HPI:  65M PMH ACC/AHA stage D HF due to NICM HM2 LVAD , TV annuloplasty ring 17 as destination therapy due to severe peripheral artery disease with significant stenosis  SIADH, Depression, CKD-3 with hyperkalemia, past E. coli UTIs, driveline drainage (21) and COVID-19 (back in 2020)  He was recently seen in clinic where he complained of abdominal pain and dark stools w constipation back in May. He presents to [facility] ER today weakness and fatigue, moderate and + Black stools for three days, on coumadin secondary to warfarin use in the setting of an LVAD. Patient has required transfusions for GIB in the past. Mostly recently back in 2021 pt had anemia with dark stools. No interventions was done at that time. However Last Endoscopy was done in 2020 (negative). Today labs show patient is anemic with H/H of 4.5/16.3,. INR is 8.84 MAP in the 90s, Temp 35.1. He denies any chest pain, shortness of breath, dizziness, abd pain, nausea or vomiting.       (2021 16:57)      Surgery/Hospital Course:   admit for melena w/ anemia, INR 8.84   6/15 Capsul study (+) for small bowel bleed, balloon endoscopy (old blood in prox ileum); post EGD - septic w/ L opacity, re-intubated for concern for aspiration, TTE (Mod MR, decrease biV w/ interventricular septum boweing towards R)   bronch    +C Diff    CT C/A/P: Fluid filled colon which may be 2/2 rapid transit. Small bilateral pleffs with associates. Compressive atelectasis New ISABELLE & LLL  parenchymal opacities, suspicious for pneumonia. Moderate stenosis in the proximal superior mesenteric artery.    #8 Shiley trach at bedside    LVAD speed increased to 9200   Bronch   TC since . Patient transferred to SDU.    INR today 2.64.  H&H 7.3/24 this AM.  Will repeat CBC at noon, and will send stool guaiac Patient with persistent abdominal tenderness, rate of tube feeds decreased.  No nausea/vomiting.     INR today 2.4. H&H 9.1/28.6 low flow overnight /N&V, refusing Tube feeds on D5 1/2 normal  @50 cc/hr   INR 2.69  H&H 7.7/.1 refusing Tube feeds on D5 1/2 normal  @50 cc/hr. This am + BM Melena Dr Oneill HF  aware- PRBC x1  GI team consulted -  NPO  plan on study in am-  D/w Dr Cadet Patient  to return to CTU for further management; 1PRBCS    Post op INR 2.2 today.  No bleeding. BC + for SM.  Pt is hypotensive requiring pressor and inotropic support.  ID follow up today on Cefepime will follow.   R PTC for PTX    CT C/A/P: sub q emphysema in R chest wall, GGO RUL, small ascites CTH negative; Abd US: GB thickening, pericholecystic fluid     Perchole drain in place continues to drain total output overnight 133.  Fever today 38.8    duplex LE negative    Patient with persistent abdominal pain, refusing tube feeds and medications, Psych consulted   CTA A/P ordered to r/o mesenteric ischemia 2/2 persistent anorexia, nausea, vomiting. Revealed:  Evaluation of the mesenteric vessels is limited by streak artifact from LVAD. There appears to be severe stenosis of the proximal SMA; abdominal mesenteric doppler is recommended for further evaluation. 2.  No small bowel findings to suggest acute mesenteric ischemia. 3.  Focal dissections involving the right and left common iliac arteries.  8/15: Cultures resulted BC 1/2 +GPC in clusters, SC enterobacter; mesenteric duplex: borderline stenosis of proximal SMA  : CT C/A/P noncon: Nondular opacities in R lung apex w/cavitation, abd nl  :  Continue current care, treatment of thyrotoxicosis with medications as per endocrine, d/c ABX as per team.    RUQ sono: Contracted gallbladder with cholecystostomy tube in place.  9/10 failed SMA stent, c/b RP bleed s/p 3U PRCB   CTA Abd/Pelvis L RP hematoma    Trach decannulated    intubated fro resp distress for increased WOB, LL pneumonia; CT C/A/P: progressive ISABELLE opacities and L consolidations, multifocal pneumonia    TTE (EF 25%, decreased RV, mod MR)   10/3 VT -> Lidocaine gtt   10/4 Trach size 6 DCT cuff  10/5 CT abd (neg for intra-abd abcess, severe stenosis of prox SMA and b/l iliac arteries)  10/18 SMA Stent     Today:  - Has been Trach collaring since 6AM  - Tube feeds currently on hold due to persistent abdominal pain, and vomiting.   - Will obtain XR to observe if Tube feeds is post pyloric.   - Vascular recommending US of LUE arterial at brachial access site due to hematoma.     REVIEW OF SYSTEMS:  Gen: No fever  EYES/ENT: No visual changes;  No vertigo or throat pain   NECK: No pain   RES:  No shortness of breath or Cough  CARD: No chest pain   GI: +intermittently c/o abd pain and nausea   : No dysuria  NEURO: No weakness  SKIN: No itching, rashes    ICU Vital Signs Last 24 Hrs  T(C): 36.1 (20 Oct 2021 04:00), Max: 36.5 (19 Oct 2021 16:00)  T(F): 97 (20 Oct 2021 04:00), Max: 97.7 (19 Oct 2021 16:00)  HR: 112 (20 Oct 2021 06:30) (82 - 144)  BP: --  BP(mean): --  ABP: 106/67 (20 Oct 2021 06:30) (84/61 - 124/64)  ABP(mean): 82 (20 Oct 2021 06:30) (70 - 102)  RR: 20 (20 Oct 2021 06:30) (8 - 32)  SpO2: 100% (20 Oct 2021 06:30) (94% - 100%)      Physical Exam:  Gen:  NAD  CNS: moves all extremities to command   Neck: no JVD, +trach, +central line   RES : coarse breath sounds, no wheezing    CVS: +LVAD hum   Abd: Soft. Sybil drain with bilious fluid. Positive BS throughout. Mild RUQ abdominal tenderness by perc sybil.  Skin: No rash, erythema, cyanosis.  Vasc: Warm and well-perfused.  Ext:  no edema      ============================I/O===========================   I&O's Detail    19 Oct 2021 07:01  -  20 Oct 2021 07:00  --------------------------------------------------------  IN:    dextrose 5% + sodium chloride 0.45% w/ Additives: 720 mL    IV PiggyBack: 1486 mL    Miscellaneous Tube Feedin mL  Total IN: 2326 mL    OUT:    Dexmedetomidine: 0 mL    Drain (mL): 195 mL    Emesis (mL): 50 mL    Voided (mL): 2800 mL  Total OUT: 3045 mL    Total NET: -719 mL        ============================ LABS =========================                        10.3   14.92 )-----------( 233      ( 20 Oct 2021 01:06 )             31.8     10-20    129<L>  |  92<L>  |  5<L>  ----------------------------<  99  4.3   |  24  |  0.53    Ca    9.6      20 Oct 2021 01:06  Phos  2.4     10-20  Mg     2.4     10-20    TPro  7.9  /  Alb  4.2  /  TBili  0.8  /  DBili  x   /  AST  17  /  ALT  22  /  AlkPhos  192<H>  10-20    LIVER FUNCTIONS - ( 20 Oct 2021 01:06 )  Alb: 4.2 g/dL / Pro: 7.9 g/dL / ALK PHOS: 192 U/L / ALT: 22 U/L / AST: 17 U/L / GGT: x           PT/INR - ( 20 Oct 2021 01:06 )   PT: 16.3 sec;   INR: 1.38 ratio         PTT - ( 20 Oct 2021 01:06 )  PTT:28.1 sec  ABG - ( 20 Oct 2021 01:00 )  pH, Arterial: 7.41  pH, Blood: x     /  pCO2: 45    /  pO2: 162   / HCO3: 28    / Base Excess: 3.3   /  SaO2: 100.0         ======================Micro/Rad/Cardio=================  Culture: Reviewed   CXR: Reviewed  Echo:Reviewed  ======================================================  PAST MEDICAL & SURGICAL HISTORY:  CHF (congestive heart failure)    CAD (coronary artery disease)    Depression    Pleural effusion    History of  novel coronavirus disease (COVID-19)  2020    Hemorrhoids    Bleeding hemorrhoids    Peripheral arterial disease    Claudication    BPH with urinary obstruction    ACC/AHA stage D systolic heart failure    Anticoagulation goal of INR 2.0 to 2.5    Falls    Clavicle fracture    CKD (chronic kidney disease), stage III    Iron deficiency anemia    H/O epistaxis    Vertigo    GI bleed    S/P TVR (tricuspid valve repair)    S/P ventricular assist device    S/P endoscopy      ====================ASSESSMENT ==============  S/p SMA Stent on 10/18   Stage D Nonischemic Cardiomyopathy, Status Post HM2 on 2017    Cardiogenic shock  Hemodynamic instability   Acute hypoxemic respiratory failure s/p trach , decannulated on ; Reintubated on    GI bleed , Status Post Enteroscopy   Anemia, in setting of melena   Chronic Kidney Disease  Stress hyperglycemia   C.diff positive on    Hypovolemic shock  Septic shock  Leukocytosis  GB thickening/percholecystic s/p perc sybil by IR   SMA stenosis  Serratia/citrobacter pneumonia   Stenotrophomonas pneumonia   Enterobacter pneumonia   Nasuea/vomiting  Deconditioning     Plan:  ====================== NEUROLOGY=====================  Continue to monitor neuro status per ICU protocol.   C/w sertraline and mirtazapine for depression     mirtazapine 7.5 milliGRAM(s) Oral daily  sertraline 100 milliGRAM(s) Oral daily  ==================== RESPIRATORY======================  Acute hypoxemic respiratory failure s/p #8 shiley trach on ; decannulated on ; Reintubated on ; trach size 6 DCT cuff on 10/4   Continue close monitoring of respiratory rate and breathing pattern, following of ABG’s with [place] monitoring, continuous pulse oximetry monitoring.   Has been Trach collaring since 6AM    Mechanical Ventilation:  Mode: Vent off  FiO2: 40    ====================CARDIOVASCULAR==================  Stage D Nonischemic Cardiomyopathy, Status Post HM2 on 2017; LVAD settings 9200, flow 4.6  TTE 10/4:  Severely decreased LV systolic function, Diffuse hypokinesis. Mild AR. No pericardial effusion   VT on 10/4, s/p Lidocaine drip, continue close tele monitoring   ASA for recent SMA stent     aspirin  chewable 81 milliGRAM(s) Oral daily    ===================HEMATOLOGIC/ONC ===================  CTA A/P on  +L RP hematoma   Acute blood loss anemia, monitor H&H/Plts   Continue Plavix as per Vascular recs   Vascular recommending US of LUE arterial at brachial access site due to hematoma.     clopidogrel Tablet 75 milliGRAM(s) Oral daily    ===================== RENAL =========================  Hx of CKD, continue monitoring I&Os, BUN/Cr, and urine output   Replete lytes PRN. Keep K> 4 and Mg >2    ==================== GASTROINTESTINAL===================  TFs held in setting of ongoing nausea / recent emesis   CT A/P 10/5 neg for intra-abd abcess, severe stenosis of prox SMA and b/l iliac arteries  CTA A/P : Evaluation of the mesenteric vessels is limited by streak artifact from LVAD. There appears to be severe stenosis of the proximal SMA; abdominal mesenteric doppler is recommended for further evaluation. 2.  No small bowel findings to suggest acute mesenteric ischemia. 3.  Focal dissections involving the right and left common iliac arteries.  Stenosis of proximal SMA, s/p failed SMA stent c/b RP bleed on 9/10 - SMA stent with Vascular 10/18   Simethicone PRN for gas   Reglan for gut motility  Zofran for nausea   Tube feeds currently on hold due to persistent abdominal pain, and vomiting.   Will obtain XR to observe if Tube feeds is post pyloric.     dextrose 5% + sodium chloride 0.45% with potassium chloride 10 mEq/L 1000 milliLiter(s) (30 mL/Hr) IV Continuous <Continuous>  multivitamin 1 Tablet(s) Oral daily  pantoprazole  Injectable 40 milliGRAM(s) IV Push every 12 hours  simethicone 80 milliGRAM(s) Chew every 8 hours PRN Gas  sodium chloride 0.9%. 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   CT C/A/P : progressive ISABELLE opacities and L consolidations, multifocal pneumonia  BCx 1/2 on 10/1 +Enterobacter cloacae complex, BCx  10/14+ Serratia marcescens   SCX on 10/14 +Stenotrophomonas maltophilia, c/w Bactrim and Vancomycin     trimethoprim / sulfamethoxazole IVPB 300 milliGRAM(s) IV Intermittent every 6 hours  vancomycin  IVPB 1000 milliGRAM(s) IV Intermittent every 12 hours      Patient requires continuous monitoring with bedside rhythm monitoring, pulse ox monitoring, and intermittent blood gas analysis. Care plan discussed with ICU care team. Patient remained critical and at risk for life threatening decompensation.     By signing my name below, IJanina Tiffany, attest that this documentation has been prepared under the direction and in the presence of CLAUDIA Gale   Electronically signed: Emily Roa Scribe, 10-20-21 @ 08:09    IAleksandra PA  , personally performed the services described in this documentation. all medical record entries made by the luisibmel were at my direction and in my presence. I have reviewed the chart and agree that the record reflects my personal performance and is accurate and complete  Electronically signed: CLAUDIA Gale

## 2021-10-20 NOTE — PROGRESS NOTE ADULT - PROBLEM SELECTOR PLAN 3
S/p LVAD placement. Treatment in CTICU.  - Off pressors Chronic, thought to be 2/2 SMA which is now s/p x 2 stents.   Today he is denying pain   Consider Tylenol IV for mild pain and Fentanyl 12.5mcg IV q 2PRN for moderate to severe pain.

## 2021-10-20 NOTE — PROGRESS NOTE ADULT - PROBLEM SELECTOR PLAN 7
Will continue to f/u for symptoms, GOC, ACP, and resources.         Francisco Burgos MD   Geriatrics and Palliative Care (GAP) Consult Service    of Geriatric and Palliative Medicine  [facility]      Please page the following number for clinical matters between the hours of 9 am and 5 pm from Monday through Friday : [phone]    After 5pm and on weekends, please see the contact information below:    In the event of newly developing, evolving, or worsening symptoms, please contact the Palliative Medicine team via pager (if the patient is at [facility] #[phone] or if the patient is at [facility] #[phone]) The Geriatric and Palliative Medicine service has coverage 24 hours a day/ 7 days a week to provide medical recommendations regarding symptom management needs via telephone

## 2021-10-20 NOTE — PROGRESS NOTE ADULT - PROBLEM SELECTOR PLAN 6
- On methimazole and hydrocortisone taper as per endocrinology - Abx duration and choice as per ID  - Prior cholecystitis w/ per dawn drain with bilious output.

## 2021-10-20 NOTE — PROGRESS NOTE ADULT - PROBLEM SELECTOR PLAN 5
GOC meeting needed to establish patient's wishes regarding surgical intervention.  - Plan for GOC with palliative, heart failure and possibly vascular surgery tomorrow- our availability is from 10-3:30pm. Attempted to page vascular surgery to coordinate as they left sign off note, no returned call requires assistance with all ADLs, PPSV2 30%  deconditioned, will benefit from therapy once stable.

## 2021-10-20 NOTE — PROGRESS NOTE ADULT - SUBJECTIVE AND OBJECTIVE BOX
Enrike Gonzalez  ID: [id number]    SUBJECTIVE AND OBJECTIVE: When evaluating the patient he had an episode of what appeared to be cough and dry heaving with clear fluid regurgitating through his mouth. When asked, he denied nausea or vomiting but having cough spells that were causing partial reflux of food. However, when d/w the HF PA, it was indicated that earlier today, the patient was actually c/o nausea and vomiting reason why tube feeds were placed on hold.      INTERVAL HPI/OVERNIGHT EVENTS: No acute events overnight.    DNR on chart:   Allergies    Amiodarone Hydrochloride (Other (Severe))    Intolerances    MEDICATIONS  (STANDING):  albuterol/ipratropium for Nebulization 3 milliLiter(s) Nebulizer every 6 hours  aspirin  chewable 81 milliGRAM(s) Oral daily  chlorhexidine 0.12% Liquid 5 milliLiter(s) Oral Mucosa two times a day  chlorhexidine 0.12% Liquid 15 milliLiter(s) Oral Mucosa every 12 hours  clopidogrel Tablet 75 milliGRAM(s) Oral daily  dextrose 40% Gel 15 Gram(s) Oral once  dextrose 5% + sodium chloride 0.45% with potassium chloride 10 mEq/L 1000 milliLiter(s) (30 mL/Hr) IV Continuous <Continuous>  dextrose 50% Injectable 12.5 Gram(s) IV Push once  insulin lispro (ADMELOG) corrective regimen sliding scale   SubCutaneous every 6 hours  methimazole 20 milliGRAM(s) Oral <User Schedule>  metoclopramide Injectable 10 milliGRAM(s) IV Push every 8 hours  mirtazapine 7.5 milliGRAM(s) Oral daily  multivitamin 1 Tablet(s) Oral daily  ondansetron Injectable 8 milliGRAM(s) IV Push every 8 hours  pantoprazole  Injectable 40 milliGRAM(s) IV Push every 12 hours  sertraline 100 milliGRAM(s) Oral daily  sodium chloride 0.9%. 1000 milliLiter(s) (10 mL/Hr) IV Continuous <Continuous>  thiamine 100 milliGRAM(s) Oral daily  trimethoprim / sulfamethoxazole IVPB 300 milliGRAM(s) IV Intermittent every 6 hours  vancomycin  IVPB 1000 milliGRAM(s) IV Intermittent every 12 hours    MEDICATIONS  (PRN):  simethicone 80 milliGRAM(s) Chew every 8 hours PRN Gas    ITEMS UNCHECKED ARE NOT PRESENT    PRESENT SYMPTOMS: [ ]Unable to obtain due to poor mentation   Source if other than patient:  [ ]Family   [ ]Team     Pain:  [ ]yes [x ]no  QOL impact -   Location -   Aggravating factors -  Quality -   Radiation -   Timing-   Severity (0-10 scale):  Minimal acceptable level (0-10 scale):     Dyspnea:                           [ ]Mild [ ]Moderate [ ]Severe  Anxiety:                             [ ]Mild [ ]Moderate [ ]Severe  Fatigue:                             [ ]Mild [ ]Moderate [ ]Severe  Nausea:                             [ ]Mild [ ]Moderate [X]Severe  Loss of appetite:              [ ]Mild [ ]Moderate [ ]Severe  Constipation:                    [ ]Mild [ ]Moderate [ ]Severe    CPOT:    https://www.[facility].org/getattachment/[email or web address]/Critical-Care-Pain-Observation-Tool-(CPOT)    PAIN AD Score:	  http://[email or web address]/painad.pdf (Ctrl + left click to view)    Other Symptoms:  [X]All other review of systems negative but weakness. He was saying "I need to try to walk."     Palliative Performance Status Version 2:        30 %      http://[email or web address]/files/news/palliative_performance_scale_[email or web address].pdf    PHYSICAL EXAM:  Vital Signs Last 24 Hrs  T(C): 37.3 (11 Oct 2021 15:00), Max: 37.3 (11 Oct 2021 15:00)  T(F): 99.1 (11 Oct 2021 15:00), Max: 99.1 (11 Oct 2021 15:00)  HR: 123 (11 Oct 2021 16:00) (89 - 127)  BP: --  BP(mean): --  RR: 24 (11 Oct 2021 16:00) (10 - 36)  SpO2: 99% (11 Oct 2021 16:00) (94% - 100%) I&O's Summary    10 Oct 2021 07:01  -  11 Oct 2021 07:00  --------------------------------------------------------  IN: 1495 mL / OUT: 730 mL / NET: 765 mL    11 Oct 2021 07:01  -  11 Oct 2021 16:55  --------------------------------------------------------  IN: 430 mL / OUT: 845 mL / NET: -415 mL       GENERAL:  [X]Alert  [ ]Oriented x   [ ]Lethargic  [ ]Cachexia  [ ]Unarousable  [X]Verbal  [ ]Non-Verbal  Behavioral:   [ ]Anxiety  [ ]Delirium [ ]Agitation [ ]Other  HEENT:  [X]Normal   [ ]Dry mouth   [ ]ET Tube/Trach  [ ]Oral lesions  PULMONARY:   [X]Clear [ ]Tachypnea  [ ]Audible excessive secretions   [ ]Rhonchi        [ ]Right [ ]Left [ ]Bilateral  [ ]Crackles        [ ]Right [ ]Left [ ]Bilateral  [ ]Wheezing     [ ]Right [ ]Left [ ]Bilateral  [ ]Diminished BS [ ] Right [ ]Left [ ]Bilateral  CARDIOVASCULAR:    [X]Regular [ ]Irregular [ ]Tachy  [ ]Jose [ ]Murmur [ ]Other  GASTROINTESTINAL:  [X]Soft  [ ]Distended   [ ]+BS  [ ]Non tender [ ]Tender  [ ]PEG [ ]OGT/ NGT   Last BM:    GENITOURINARY:  [ ]Normal [ ]Incontinent   [ ]Oliguria/Anuria   [ ]Landrum  MUSCULOSKELETAL:   [ ]Normal   [x ]Weakness  [ ]Bed/Wheelchair bound [ ]Edema  NEUROLOGIC:   [ ]No focal deficits  [ ] Cognitive impairment  [ x] Dysphagia [ ]Dysarthria [ ] Paresis [ ]Other   SKIN:   [ ]Normal  [ ]Rash   [ ]Pressure ulcer(s) [ ]y [ ]n present on admission    CRITICAL CARE:  [ ]Shock Present  [ ]Septic [ ]Cardiogenic [ ]Neurologic [ ]Hypovolemic  [ ]Vasopressors [ ]Inotropes  [x ]Respiratory failure present [ ]Mechanical Ventilation [ ]Non-invasive ventilatory support [ ]High-Flow Mode: CPAP with PS, FiO2: 40, PEEP: 5, PS: 10, ITime: 1, MAP: 8, PIP: 17  [ ]Acute  [ ]Chronic [ ]Hypoxic  [ ]Hypercarbic [ ]Other  [ ]Other organ failure     LABS:                        11.5   13.30 )-----------( 264      ( 11 Oct 2021 00:25 )             34.4   10-11    134<L>  |  96  |  13  ----------------------------<  78  3.1<L>   |  22  |  0.40<L>    Ca    10.0      11 Oct 2021 00:24  Phos  1.6     10-11  Mg     1.5     10-11    TPro  7.7  /  Alb  4.0  /  TBili  1.4<H>  /  DBili  x   /  AST  19  /  ALT  19  /  AlkPhos  147<H>  10-11          RADIOLOGY & ADDITIONAL STUDIES: all recent imaging reviewed    Protein Calorie Malnutrition Present: [ ]mild [ ]moderate [ ]severe [ ]underweight [ ]morbid obesity  https://www.andeal.org/vault/2440/web/files/ONC/Table_Clinical%20Characteristics%20to%20Document%20Malnutrition-White%20JV%20et%20al%202012.pdf    Height (cm): 177.8 (10-04-21 @ 14:21), 177.8 (01-11-21 @ 09:28)  Weight (kg): 61.6 (10-04-21 @ 14:21), 74 (01-12-21 @ 10:48)  BMI (kg/m2): 19.5 (10-04-21 @ 14:21), 23.4 (01-12-21 @ 10:48)    [X]PPSV2 < or = 30%  [ ]significant weight loss [X]poor nutritional intake [ ]anasarca    [ ]Artificial Nutrition    REFERRALS:   [ ]Chaplaincy  [ ]Hospice  [ ]Child Life  [X]Social Work  [ ]Case management [ ]Holistic Therapy     Goals of Care Document:     Enrike Gonzalez  ID: [id number]    SUBJECTIVE AND OBJECTIVE: When evaluating the patient he had an episode of what appeared to be cough and dry heaving with clear fluid regurgitating through his mouth. When asked, he denied nausea or vomiting but having cough spells that were causing partial reflux of food. However, when d/w the HF PA, it was indicated that earlier today, the patient was actually c/o nausea and vomiting reason why tube feeds were placed on hold.      INTERVAL HPI/OVERNIGHT EVENTS: No acute events overnight.    DNR on chart:   Allergies    Amiodarone Hydrochloride (Other (Severe))    Intolerances    MEDICATIONS  (STANDING):  albuterol/ipratropium for Nebulization 3 milliLiter(s) Nebulizer every 6 hours  aspirin  chewable 81 milliGRAM(s) Oral daily  chlorhexidine 0.12% Liquid 5 milliLiter(s) Oral Mucosa two times a day  chlorhexidine 0.12% Liquid 15 milliLiter(s) Oral Mucosa every 12 hours  clopidogrel Tablet 75 milliGRAM(s) Oral daily  dextrose 40% Gel 15 Gram(s) Oral once  dextrose 5% + sodium chloride 0.45% with potassium chloride 10 mEq/L 1000 milliLiter(s) (30 mL/Hr) IV Continuous <Continuous>  dextrose 50% Injectable 12.5 Gram(s) IV Push once  insulin lispro (ADMELOG) corrective regimen sliding scale   SubCutaneous every 6 hours  methimazole 20 milliGRAM(s) Oral <User Schedule>  metoclopramide Injectable 10 milliGRAM(s) IV Push every 8 hours  mirtazapine 7.5 milliGRAM(s) Oral daily  multivitamin 1 Tablet(s) Oral daily  ondansetron Injectable 8 milliGRAM(s) IV Push every 8 hours  pantoprazole  Injectable 40 milliGRAM(s) IV Push every 12 hours  sertraline 100 milliGRAM(s) Oral daily  sodium chloride 0.9%. 1000 milliLiter(s) (10 mL/Hr) IV Continuous <Continuous>  thiamine 100 milliGRAM(s) Oral daily  trimethoprim / sulfamethoxazole IVPB 300 milliGRAM(s) IV Intermittent every 6 hours  vancomycin  IVPB 1000 milliGRAM(s) IV Intermittent every 12 hours    MEDICATIONS  (PRN):  simethicone 80 milliGRAM(s) Chew every 8 hours PRN Gas    ITEMS UNCHECKED ARE NOT PRESENT    PRESENT SYMPTOMS: [ ]Unable to obtain due to poor mentation   Source if other than patient:  [ ]Family   [ ]Team     Pain:  [ ]yes [x ]no  QOL impact -   Location -   Aggravating factors -  Quality -   Radiation -   Timing-   Severity (0-10 scale):  Minimal acceptable level (0-10 scale):     Dyspnea:                           [ ]Mild [ ]Moderate [ ]Severe  Anxiety:                             [ ]Mild [ ]Moderate [ ]Severe  Fatigue:                             [ ]Mild [ ]Moderate [ ]Severe  Nausea:                             [ ]Mild [ ]Moderate [X]Severe  Loss of appetite:              [ ]Mild [ ]Moderate [ ]Severe  Constipation:                    [ ]Mild [ ]Moderate [ ]Severe    CPOT:    https://www.[facility].org/getattachment/[email or web address]/Critical-Care-Pain-Observation-Tool-(CPOT)    PAIN AD Score:	  http://geriatrictoolkit.[facility]/cog/painad.pdf (Ctrl + left click to view)    Other Symptoms:  [X]All other review of systems negative but weakness. He was saying "I need to try to walk."     Palliative Performance Status Version 2:        30 %      http://[facility].org/files/news/palliative_performance_scale_[email or web address].pdf    PHYSICAL EXAM:  Vital Signs Last 24 Hrs  T(C): 37.3 (11 Oct 2021 15:00), Max: 37.3 (11 Oct 2021 15:00)  T(F): 99.1 (11 Oct 2021 15:00), Max: 99.1 (11 Oct 2021 15:00)  HR: 123 (11 Oct 2021 16:00) (89 - 127)  BP: --  BP(mean): --  RR: 24 (11 Oct 2021 16:00) (10 - 36)  SpO2: 99% (11 Oct 2021 16:00) (94% - 100%) I&O's Summary    10 Oct 2021 07:01  -  11 Oct 2021 07:00  --------------------------------------------------------  IN: 1495 mL / OUT: 730 mL / NET: 765 mL    11 Oct 2021 07:01  -  11 Oct 2021 16:55  --------------------------------------------------------  IN: 430 mL / OUT: 845 mL / NET: -415 mL       GENERAL:  [X]Alert  [x ]Oriented x 2  [ ]Lethargic  [x ]Cachexia  [ ]Unarousable  [X]Verbal  [ ]Non-Verbal  Behavioral:   [ ]Anxiety  [ ]Delirium [ ]Agitation [ ]Other  HEENT:  [ ]Normal   [ ]Dry mouth   [x ]ET Tube/Trach  [ ]Oral lesions  PULMONARY:   [ ]Clear [ ]Tachypnea  [x ]Audible excessive secretions   [ ]Rhonchi        [ ]Right [ ]Left [ ]Bilateral  [ ]Crackles        [ ]Right [ ]Left [ ]Bilateral  [ ]Wheezing     [ ]Right [ ]Left [ ]Bilateral  [x ]Diminished BS [ ] Right [ ]Left [x ]Bilateral    CARDIOVASCULAR:    [X]Regular [ ]Irregular [ ]Tachy  [ ]Jose [ ]Murmur [ ]Other [x] LVAD   GASTROINTESTINAL:  [X]Soft  [ ]Distended   [x ]+BS  [ ]Non tender [ ]Tender  [ ]PEG [ x]OGT/ NGT   Last BM:  10/15  GENITOURINARY:  [ ]Normal [ ]Incontinent   [ ]Oliguria/Anuria   [x ]Landrum  MUSCULOSKELETAL:   [ ]Normal   [x ]Weakness  [ ]Bed/Wheelchair bound [ ]Edema  NEUROLOGIC:   [ ]No focal deficits  [ ] Cognitive impairment  [ x] Dysphagia [ ]Dysarthria [ ] Paresis [ ]Other   SKIN:   [ ]Normal  [ ]Rash   [ ]Pressure ulcer(s) [ ]y [ ]n present on admission  Left upper arm wound covered.   CRITICAL CARE:  [ ]Shock Present  [ ]Septic [ ]Cardiogenic [ ]Neurologic [ ]Hypovolemic  [ ]Vasopressors [ ]Inotropes  [x ]Respiratory failure present [ ]Mechanical Ventilation [ ]Non-invasive ventilatory support [ ]High-Flow Mode: CPAP with PS, FiO2: 40, PEEP: 5, PS: 10, ITime: 1, MAP: 8, PIP: 17  [ ]Acute  [ ]Chronic [ ]Hypoxic  [ ]Hypercarbic [ ]Other  [ ]Other organ failure     LABS:                                      10.3   14.92 )-----------( 233      ( 20 Oct 2021 01:06 )             31.8   10-20    129<L>  |  92<L>  |  5<L>  ----------------------------<  99  4.3   |  24  |  0.53    Ca    9.6      20 Oct 2021 01:06  Phos  2.4     10-20  Mg     2.4     10-20    TPro  7.9  /  Alb  4.2  /  TBili  0.8  /  DBili  x   /  AST  17  /  ALT  22  /  AlkPhos  192<H>  10-20          RADIOLOGY & ADDITIONAL STUDIES: all recent imaging reviewed    Protein Calorie Malnutrition Present: [ ]mild [ ]moderate [ ]severe [ ]underweight [ ]morbid obesity  https://www.andeal.org/vault/2440/web/files/ONC/Table_Clinical%20Characteristics%20to%20Document%20Malnutrition-White%20JV%20et%20al%202012.pdf    Height (cm): 177.8 (10-04-21 @ 14:21), 177.8 (01-11-21 @ 09:28)  Weight (kg): 61.6 (10-04-21 @ 14:21), 74 (01-12-21 @ 10:48)  BMI (kg/m2): 19.5 (10-04-21 @ 14:21), 23.4 (01-12-21 @ 10:48)    [X]PPSV2 < or = 30%  [ ]significant weight loss [X]poor nutritional intake [ ]anasarca    [ ]Artificial Nutrition    REFERRALS:   [ ]Chaplaincy  [ ]Hospice  [ ]Child Life  [X]Social Work  [ ]Case management [ ]Holistic Therapy     Goals of Care Document:

## 2021-10-20 NOTE — PROGRESS NOTE ADULT - ASSESSMENT
67 YO M with a history of stage D NICM s/p HM2 on 9/2017 as DT (due to severe PAD) with TV ring, prior COVID-19 infection 4/2020, recurrent syncope post LVAD s/p ILR, and chronic abdominal pain with prior negative workup who was admitted 6/14/21 with symptomatic anemia with Hgb 4.5 in setting of INR 8.8 without hemodynamic instability and has since had a protracted hospitalization. He was transfused and underwent VCE which showed active bleeding in the mid small bowel but subsequent enteroscopy 6/15 did not reveal any active bleeding. He acutely decompensated after procedure with fever/hypertension, low flow alarms, and pulmonary infiltrate with hypoxia requiring intubation from probable aspiration PNA. He was unable to extubated and has since undergone tracheostomy but tolerating persistent trach collar and nearing ability for decannulation. 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 He underwent angiogram on 9/10, stent was unable to be placed and course was then complicated by RP bleed. He continued to have persistent leukocytosis and febrile episodes and was noted to have positive sputum culture for serratia marcescens and completed his course of abx. He was initially decannulated on 9/23. Recently he started having multiples of melena, emesis and went into acte respiratory distress and was ultimately re-intubated on 9/26.     He had blood cultures are positive for enterobacter cloacae/serratia, remains on IV antibiotics. He is s/p trach with ENT on 10/4. Sputum cultures positive for stenotrophomonas and blood cultures positive for serratia on bactrim. S/p SMA stent x 2 10/18.  65 YO M with a history of stage D NICM s/p HM2 on 9/2017 as DT (due to severe PAD) with TV ring, prior COVID-19 infection 4/2020, recurrent syncope post LVAD s/p ILR, and chronic abdominal pain with prior negative workup who was admitted 6/14/21 with symptomatic anemia with Hgb 4.5 in setting of INR 8.8 without hemodynamic instability and has since had a protracted hospitalization. He was transfused and underwent VCE which showed active bleeding in the mid small bowel but subsequent enteroscopy 6/15 did not reveal any active bleeding. He acutely decompensated after procedure with fever/hypertension, low flow alarms, and pulmonary infiltrate with hypoxia requiring intubation from probable aspiration PNA. He was unable to extubated and has since undergone tracheostomy but tolerating persistent trach collar and nearing ability for decannulation. 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 He underwent angiogram on 9/10, stent was unable to be placed and course was then complicated by RP bleed. He continued to have persistent leukocytosis and febrile episodes and was noted to have positive sputum culture for serratia marcescens and completed his course of abx. He was initially decannulated on 9/23. Recently he started having multiples of melena, emesis and went into acte respiratory distress and was ultimately re-intubated on 9/26.     He had blood cultures are positive for enterobacter cloacae/serratia, remains on IV antibiotics. He is s/p trach with ENT on 10/4. Sputum cultures positive for stenotrophomonas and blood cultures positive for serratia on bactrim. S/p SMA stent x 2 10/18. He continues to have episodes of nausea and vomiting, tube feeds remain on hold. His overall prognosis remains guarded, will require weekly family meetings to discuss GOC.

## 2021-10-20 NOTE — PROGRESS NOTE ADULT - SUBJECTIVE AND OBJECTIVE BOX
Vascular Surgery    SUBJECTIVE: Pt seen and examined on rounds with team. No acute events overnight.        OBJECTIVE    PHYSICAL EXAM:  Gen: NAD, well-developed, responsive   HEENT: trach collar  Abd: soft, tender to deep palpation  Extremities: Upper extremity access site with palpable hematoma, some oozing from incision site. Distal pulses palpable. Neurologically intact.     VITALS  T(C): 36.1 (10-20-21 @ 04:00), Max: 36.6 (10-19-21 @ 08:00)  HR: 112 (10-20-21 @ 06:30) (81 - 144)  BP: --  RR: 20 (10-20-21 @ 06:30) (8 - 32)  SpO2: 100% (10-20-21 @ 06:30) (94% - 100%)  CAPILLARY BLOOD GLUCOSE      POCT Blood Glucose.: 104 mg/dL (20 Oct 2021 05:31)  POCT Blood Glucose.: 130 mg/dL (19 Oct 2021 17:11)  POCT Blood Glucose.: 111 mg/dL (19 Oct 2021 11:26)      Is/Os    10-19 @ 07:01  -  10-20 @ 07:00  --------------------------------------------------------  IN:    dextrose 5% + sodium chloride 0.45% w/ Additives: 720 mL    IV PiggyBack: 1486 mL    Miscellaneous Tube Feedin mL  Total IN: 2326 mL    OUT:    Dexmedetomidine: 0 mL    Drain (mL): 195 mL    Emesis (mL): 50 mL    Voided (mL): 2800 mL  Total OUT: 3045 mL    Total NET: -719 mL          MEDICATIONS (STANDING): aspirin  chewable 81 milliGRAM(s) Oral daily  clopidogrel Tablet 75 milliGRAM(s) Oral daily  dexMEDEtomidine Infusion 0.5 MICROgram(s)/kG/Hr IV Continuous <Continuous>  dextrose 40% Gel 15 Gram(s) Oral once  dextrose 5% + sodium chloride 0.45% with potassium chloride 10 mEq/L 1000 milliLiter(s) IV Continuous <Continuous>  dextrose 50% Injectable 12.5 Gram(s) IV Push once  insulin lispro (ADMELOG) corrective regimen sliding scale   SubCutaneous every 6 hours  methimazole 20 milliGRAM(s) Oral <User Schedule>  metoclopramide Injectable 10 milliGRAM(s) IV Push every 8 hours  mirtazapine 7.5 milliGRAM(s) Oral daily  multivitamin 1 Tablet(s) Oral daily  pantoprazole  Injectable 40 milliGRAM(s) IV Push every 12 hours  predniSONE   Tablet 20 milliGRAM(s) Oral every 24 hours  sertraline 100 milliGRAM(s) Oral daily  sodium chloride 0.9%. 1000 milliLiter(s) IV Continuous <Continuous>  thiamine 100 milliGRAM(s) Oral daily  trimethoprim / sulfamethoxazole IVPB 300 milliGRAM(s) IV Intermittent every 6 hours  vancomycin  IVPB 1000 milliGRAM(s) IV Intermittent every 12 hours    MEDICATIONS (PRN):ondansetron Injectable 4 milliGRAM(s) IV Push every 8 hours PRN Nausea and/or Vomiting  simethicone 80 milliGRAM(s) Chew every 8 hours PRN Gas      LABS  CBC (10-20 @ 01:06)                              10.3<L>                         14.92<H>  )----------------(  233        --    % Neutrophils, --    % Lymphocytes, ANC: --                                  31.8<L>  CBC (10-19 @ 03:30)                              10.1<L>                         9.39    )----------------(  195        73.5  % Neutrophils, 14.2  % Lymphocytes, ANC: 6.90                                31.2<L>    BMP (10-20 @ 01:06)             129<L>  |  92<L>   |  5<L>  		Ca++ --      Ca 9.6                ---------------------------------( 99    		Mg 2.4                4.3     |  24      |  0.53  			Ph 2.4<L>  BMP (10-19 @ 22:54)             128<L>  |  93<L>   |  5<L>  		Ca++ --      Ca 9.3                ---------------------------------( 107<H>		Mg --                 4.3     |  23      |  0.51  			Ph --        LFTs (10-20 @ 01:06)      TPro 7.9 / Alb 4.2 / TBili 0.8 / DBili -- / AST 17 / ALT 22 / AlkPhos 192<H>  LFTs (10-19 @ 22:54)      TPro 7.9 / Alb 4.0 / TBili 0.9 / DBili -- / AST 18 / ALT 22 / AlkPhos 185<H>    Coags (10-20 @ 01:06)  aPTT 28.1 / INR 1.38<H> / PT 16.3<H>  Coags (10-18 @ 20:53)  aPTT 34.9 / INR 1.48<H> / PT 17.4<H>      ABG (10-20 @ 01:00)     7.41 / 45 / 162<H> / 28 / 3.3<H> / 100.0<H>%     Lactate:    ABG (10-19 @ 03:19)     7.41 / 43 / 200<H> / 27 / 2.3 / 100.0<H>%     Lactate:          IMAGING STUDIES

## 2021-10-20 NOTE — PROGRESS NOTE ADULT - PROBLEM SELECTOR PLAN 7
- Has been transfused multiple times throughout this admission, last transfusion was on 10/5  - CBC daily. - On methimazole and hydrocortisone taper as per endocrinology

## 2021-10-21 NOTE — GOALS OF CARE CONVERSATION - ADVANCED CARE PLANNING - FAMILY/RELATIVE
Sister, Cristina and his Son
Cristina (sister and HCP)
Cristina Rudolph (sister and HCP)
Sister Alyssa

## 2021-10-21 NOTE — PROGRESS NOTE ADULT - SUBJECTIVE AND OBJECTIVE BOX
INFECTIOUS DISEASES FOLLOW UP-- Anitra Prater  [phone]    This is a follow up note for this  66yMale with  Anemia    Acute cholecystitis        ROS:  CONSTITUTIONAL:  No fever, good appetite  CARDIOVASCULAR:  No chest pain or palpitations  RESPIRATORY:  No dyspnea  GASTROINTESTINAL:  No nausea, vomiting, diarrhea, or abdominal pain  GENITOURINARY:  No dysuria  NEUROLOGIC:  No headache,     Allergies    Amiodarone Hydrochloride (Other (Severe))    Intolerances        ANTIBIOTICS/RELEVANT:  antimicrobials  trimethoprim / sulfamethoxazole IVPB 300 milliGRAM(s) IV Intermittent every 6 hours  vancomycin  IVPB 1000 milliGRAM(s) IV Intermittent every 12 hours    immunologic:    OTHER:  aspirin  chewable 81 milliGRAM(s) Oral daily  chlorhexidine 0.12% Liquid 15 milliLiter(s) Oral Mucosa every 12 hours  clopidogrel Tablet 75 milliGRAM(s) Oral daily  dextrose 40% Gel 15 Gram(s) Oral once  dextrose 5% + sodium chloride 0.45% with potassium chloride 10 mEq/L 1000 milliLiter(s) IV Continuous <Continuous>  dextrose 50% Injectable 12.5 Gram(s) IV Push once  fentaNYL    Injectable 12 MICROGram(s) IV Push every 3 hours PRN  guaiFENesin Oral Liquid (Sugar-Free) 200 milliGRAM(s) Oral every 4 hours PRN  insulin lispro (ADMELOG) corrective regimen sliding scale   SubCutaneous every 6 hours  methimazole 20 milliGRAM(s) Oral <User Schedule>  metoclopramide Injectable 10 milliGRAM(s) IV Push every 8 hours  mirtazapine 7.5 milliGRAM(s) Oral daily  multivitamin 1 Tablet(s) Oral daily  ondansetron Injectable 8 milliGRAM(s) IV Push every 8 hours  pantoprazole  Injectable 40 milliGRAM(s) IV Push every 12 hours  predniSONE   Tablet 15 milliGRAM(s) Oral every 24 hours  prochlorperazine   Tablet 10 milliGRAM(s) Oral three times a day  sertraline 100 milliGRAM(s) Oral daily  simethicone 80 milliGRAM(s) Chew every 8 hours PRN  sodium chloride 0.9%. 1000 milliLiter(s) IV Continuous <Continuous>  thiamine 100 milliGRAM(s) Oral daily      Objective:  Vital Signs Last 24 Hrs  T(C): 36.4 (21 Oct 2021 12:00), Max: 36.5 (20 Oct 2021 20:00)  T(F): 97.5 (21 Oct 2021 12:00), Max: 97.7 (20 Oct 2021 20:00)  HR: 120 (21 Oct 2021 15:44) (97 - 122)  BP: --  BP(mean): --  RR: 13 (21 Oct 2021 15:42) (10 - 25)  SpO2: 97% (21 Oct 2021 15:44) (97% - 100%)    PHYSICAL EXAM:  Constitutional:no acute distress  Eyes:ALONSO, EOMI  Ear/Nose/Throat: no oral lesions, 	  Respiratory: clear BL  Cardiovascular: S1S2  Gastrointestinal:soft, (+) BS, no tenderness  Extremities:no e/e/c  No Lymphadenopathy  IV sites not inflammed.    LABS:                        9.5    9.31  )-----------( 184      ( 21 Oct 2021 00:33 )             28.8     10-21    130<L>  |  94<L>  |  6<L>  ----------------------------<  97  4.5   |  24  |  0.56    Ca    9.5      21 Oct 2021 00:33  Phos  2.8     10-21  Mg     1.7     10-21    TPro  7.8  /  Alb  4.0  /  TBili  0.8  /  DBili  x   /  AST  17  /  ALT  20  /  AlkPhos  192<H>  10-21    PT/INR - ( 20 Oct 2021 01:06 )   PT: 16.3 sec;   INR: 1.38 ratio         PTT - ( 20 Oct 2021 01:06 )  PTT:28.1 sec      MICROBIOLOGY:            RECENT CULTURES:  10-17 @ 21:54  .Blood Blood-Peripheral  --  --  --    No growth to date.  --      RADIOLOGY & ADDITIONAL STUDIES:  < from: Xray Abdomen 1 View PORTABLE -Urgent (Xray Abdomen 1 View PORTABLE -Urgent .) (10.21.21 @ 14:59) >    IMPRESSION:  Contrast is seen opacifying the ascending and transverse colon which do not appear dilated.  No evidence of small bowel obstruction.    < end of copied text >   INFECTIOUS DISEASES FOLLOW UP-- Anitra Prater  [phone]    This is a follow up note for this  66yMale with  Anemia    Acute cholecystitis        ROS:  CONSTITUTIONAL:  cachectic but awake and alert  vomited earlier today    Allergies    Amiodarone Hydrochloride (Other (Severe))    Intolerances        ANTIBIOTICS/RELEVANT:  antimicrobials  trimethoprim / sulfamethoxazole IVPB 300 milliGRAM(s) IV Intermittent every 6 hours  vancomycin  IVPB 1000 milliGRAM(s) IV Intermittent every 12 hours    immunologic:    OTHER:  aspirin  chewable 81 milliGRAM(s) Oral daily  chlorhexidine 0.12% Liquid 15 milliLiter(s) Oral Mucosa every 12 hours  clopidogrel Tablet 75 milliGRAM(s) Oral daily  dextrose 40% Gel 15 Gram(s) Oral once  dextrose 5% + sodium chloride 0.45% with potassium chloride 10 mEq/L 1000 milliLiter(s) IV Continuous <Continuous>  dextrose 50% Injectable 12.5 Gram(s) IV Push once  fentaNYL    Injectable 12 MICROGram(s) IV Push every 3 hours PRN  guaiFENesin Oral Liquid (Sugar-Free) 200 milliGRAM(s) Oral every 4 hours PRN  insulin lispro (ADMELOG) corrective regimen sliding scale   SubCutaneous every 6 hours  methimazole 20 milliGRAM(s) Oral <User Schedule>  metoclopramide Injectable 10 milliGRAM(s) IV Push every 8 hours  mirtazapine 7.5 milliGRAM(s) Oral daily  multivitamin 1 Tablet(s) Oral daily  ondansetron Injectable 8 milliGRAM(s) IV Push every 8 hours  pantoprazole  Injectable 40 milliGRAM(s) IV Push every 12 hours  predniSONE   Tablet 15 milliGRAM(s) Oral every 24 hours  prochlorperazine   Tablet 10 milliGRAM(s) Oral three times a day  sertraline 100 milliGRAM(s) Oral daily  simethicone 80 milliGRAM(s) Chew every 8 hours PRN  sodium chloride 0.9%. 1000 milliLiter(s) IV Continuous <Continuous>  thiamine 100 milliGRAM(s) Oral daily      Objective:  Vital Signs Last 24 Hrs  T(C): 36.4 (21 Oct 2021 12:00), Max: 36.5 (20 Oct 2021 20:00)  T(F): 97.5 (21 Oct 2021 12:00), Max: 97.7 (20 Oct 2021 20:00)  HR: 120 (21 Oct 2021 15:44) (97 - 122)  BP: --  BP(mean): --  RR: 13 (21 Oct 2021 15:42) (10 - 25)  SpO2: 97% (21 Oct 2021 15:44) (97% - 100%)    PHYSICAL EXAM:  Constitutional:no acute distress  Eyes:ALONSO, EOMI  Ear/Nose/Throat: no oral lesions, trach site no erythema	  Respiratory: clear BL  Cardiovascular: S1S2 VAD sounds  Gastrointestinal: unchanged tenderness, VAD dresising intact  Extremities:no e/e/c  No Lymphadenopathy  IV sites not inflammed.    LABS:                        9.5    9.31  )-----------( 184      ( 21 Oct 2021 00:33 )             28.8     10-21    130<L>  |  94<L>  |  6<L>  ----------------------------<  97  4.5   |  24  |  0.56    Ca    9.5      21 Oct 2021 00:33  Phos  2.8     10-21  Mg     1.7     10-21    TPro  7.8  /  Alb  4.0  /  TBili  0.8  /  DBili  x   /  AST  17  /  ALT  20  /  AlkPhos  192<H>  10-21    PT/INR - ( 20 Oct 2021 01:06 )   PT: 16.3 sec;   INR: 1.38 ratio         PTT - ( 20 Oct 2021 01:06 )  PTT:28.1 sec      MICROBIOLOGY:            RECENT CULTURES:  10-17 @ 21:54  .Blood Blood-Peripheral  --  --  --    No growth to date.  --      RADIOLOGY & ADDITIONAL STUDIES:  < from: Xray Abdomen 1 View PORTABLE -Urgent (Xray Abdomen 1 View PORTABLE -Urgent .) (10.21.21 @ 14:59) >    IMPRESSION:  Contrast is seen opacifying the ascending and transverse colon which do not appear dilated.  No evidence of small bowel obstruction.    < end of copied text >

## 2021-10-21 NOTE — GOALS OF CARE CONVERSATION - ADVANCED CARE PLANNING - TREATMENT GUIDELINES
Progress Notes by Melvina Brenner LPN at 9/10/2018  9:30 AM     Author: Melvina Brenner LPN Service: -- Author Type: Licensed Nurse    Filed: 9/10/2018  9:32 AM Encounter Date: 9/10/2018 Status: Attested    : Melvina Brenner LPN (Licensed Nurse) Cosigner: Jessica Diaz MD at 9/10/2018  2:36 PM    Attestation signed by Jessica Diaz MD at 9/10/2018  2:36 PM    Noted.                I met with Ora Og at the request of herself to recheck her blood pressure.  Blood pressure medications on the MAR were reviewed with patient.    Patient has taken all medications as per usual regimen: Yes  Patient reports tolerating them without any issues or concerns: Yes    Vitals:    09/10/18 0928   BP: 120/80   Patient Site: Left Arm   Patient Position: Sitting   Cuff Size: Adult Regular       Blood pressure was taken, previous encounter was reviewed, recorded blood pressure below 140/90.  Patient was discharged and the note will be sent to the provider for final review.                 
DNR Order
No

## 2021-10-21 NOTE — PROGRESS NOTE ADULT - ASSESSMENT
Assessment and Recommendation:   · Assessment	  Assessment and recommendation :  Recurrent Acute hypoxic respiratory Failure  FI02 is 40% S/P tracheostomy  on track. collar   Acute blood loss anemia S/P multiple  blood transfusion post tracheostomy   recurrent  septic shock  weaned off  vasopressin   hemorrhagic shock receiving 2 unit of blood transfusion   pan culture, fever  on IV meropenem  ,IV vancomycin and  po vancomycin   S/P cholecystostomy tube placement by IR    patient is S/P  repeat vacular procedure and SMA stent x2 complicated with LUE hematoma   AF RVR back to regular sinus Rhythm   Non ischemic cardiomyopathy continue ACE inhibitor and B-Blockers   mesenteric ischemia failure to stent SMA , no plan for repeated trial   hyponatremia fluid restriction   S/P 3 unit of blood transfusion   for vascular study of mesenteric artery in AM   INR is very high repeat , no evidence of bleeding   Stage D systolic heart failure S/P LVAD HM2   MH2 LVAD  with  TV Annuloplasty  Severe peripheral vascular disease   severe hyperglycemia on insulin coverage    Reglan 10 mg for Gastric Motility   hyperthyroidism on Methimazole 10mg TID   critical care polyneuropathy   Anemia of Acute blood Loss   severe protein caloric malnutrition   magnesium supplement keep above 2   potasium supplement   Chronic kidney disease stage III  Depressed and withdrawn   resume NG tube feeding   GI prophylaxis with PPI   Discussed with TCV team

## 2021-10-21 NOTE — GOALS OF CARE CONVERSATION - ADVANCED CARE PLANNING - CONVERSATION/DISCUSSION
Diagnosis/Prognosis/MOLST Discussed/Treatment Options
Diagnosis/Prognosis/Treatment Options
Diagnosis/Treatment Options
Diagnosis/Prognosis/Treatment Options

## 2021-10-21 NOTE — GOALS OF CARE CONVERSATION - ADVANCED CARE PLANNING - TREATMENT GUIDELINE COMMENT
watchful waiting for 4 days to make further plans
Also do not place back on a vent. However, continuing nutrition, Abx, and other treatments so his life can prolonged. However, he also wanted his symptoms to be addressed.

## 2021-10-21 NOTE — GOALS OF CARE CONVERSATION - ADVANCED CARE PLANNING - CONVERSATION DETAILS
The multidisciplinary team met with the patient and his sisterIn order to be able to communicate the patient was nodding to confirm or deny yes or no questions; however, though limited (2/2 to trach and secretions) he was also able to speak and give appropriate inputs. We discussed with them about the patient's recurrent symptoms (vomiting with likely recurrent aspiration as well as recurrent abdominal pain) despite successful SMA stenting. It was indicated that, at this point. there were not any other major interventions to be offered to try to improve the patient's GI symptoms and aspiration events. Hence, that it was expected that aspiration events were going to be recurrent and that barriers to provide artificial nutrition were going to remain. As a result of these issues, the patient was going to continue to decline, not being able to leave the hospital, and likely not being able to survive this admission. We then described two paths of care: 1) continuing Abx, attempting tube feeds, and ICU level of care in order to prolong his life while also trying to keep him comfortable; however, understanding he will continue to decompensate and die in the hospital 2) Trying to be transferred to a hospice unit where care will be focused towards symptoms Rx; however, understanding that under hospice care, treatment of recurrent infectious process will be limited and that quality of life will be a priority over quantity of life. Under hospice, his life expectancy will likely be less than if staying in ICU; however, symptoms Rx will be enhanced. After discussing with his sister he indicated he wanted to remain in the hospital and looking for plan of care # 1. However, he was clear on that he did not want to be placed back on a vent nor he wanted to have CPR (DNR. Do not put back on a Vent). Yesterday and today, he also indicated he does not want any more surgical procedures.     Though it was challenging to try to communicate with the patient, after simplifying the info given, and while being supported by his HCP,  the patient was able to give enough understanding and appreciation about the info provided (Dx, treatment options, risk and benefits of options presented). Through the meeting, teach back method was used multiple time to understand the patient's capacity to appreciate the info provided.         Francisco Burgos MD   Geriatrics and Palliative Care ([facility]) Consult Service    of Geriatric and Palliative Medicine  [facility]      Please page the following number for clinical matters between the hours of 9 am and 5 pm from Monday through Friday : [phone]    After 5pm and on weekends, please see the contact information below:    In the event of newly developing, evolving, or worsening symptoms, please contact the Palliative Medicine team via pager (if the patient is at [facility] #[phone] or if the patient is at [facility] #[phone]) The Geriatric and Palliative Medicine service has coverage 24 hours a day/ 7 days a week to provide medical recommendations regarding symptom management needs via telephone

## 2021-10-21 NOTE — PROGRESS NOTE ADULT - SUBJECTIVE AND OBJECTIVE BOX
Subjective: Patient seen and examined resting in bed. ON no issues    Medications:  aspirin  chewable 81 milliGRAM(s) Oral daily  chlorhexidine 0.12% Liquid 15 milliLiter(s) Oral Mucosa every 12 hours  clopidogrel Tablet 75 milliGRAM(s) Oral daily  dextrose 40% Gel 15 Gram(s) Oral once  dextrose 5% + sodium chloride 0.45% with potassium chloride 10 mEq/L 1000 milliLiter(s) IV Continuous <Continuous>  dextrose 50% Injectable 12.5 Gram(s) IV Push once  fentaNYL    Injectable 12 MICROGram(s) IV Push every 3 hours PRN  guaiFENesin Oral Liquid (Sugar-Free) 200 milliGRAM(s) Oral every 4 hours PRN  insulin lispro (ADMELOG) corrective regimen sliding scale   SubCutaneous every 6 hours  methimazole 20 milliGRAM(s) Oral <User Schedule>  metoclopramide Injectable 10 milliGRAM(s) IV Push every 8 hours  mirtazapine 7.5 milliGRAM(s) Oral daily  multivitamin 1 Tablet(s) Oral daily  ondansetron Injectable 8 milliGRAM(s) IV Push every 8 hours  pantoprazole  Injectable 40 milliGRAM(s) IV Push every 12 hours  predniSONE   Tablet 15 milliGRAM(s) Oral every 24 hours  prochlorperazine   Tablet 10 milliGRAM(s) Oral three times a day  sertraline 100 milliGRAM(s) Oral daily  simethicone 80 milliGRAM(s) Chew every 8 hours PRN  sodium chloride 0.9%. 1000 milliLiter(s) IV Continuous <Continuous>  thiamine 100 milliGRAM(s) Oral daily  trimethoprim / sulfamethoxazole IVPB 300 milliGRAM(s) IV Intermittent every 6 hours  vancomycin  IVPB 1000 milliGRAM(s) IV Intermittent every 12 hours      ICU Vital Signs Last 24 Hrs  T(C): 36.4 (21 Oct 2021 12:00), Max: 36.5 (20 Oct 2021 16:00)  T(F): 97.5 (21 Oct 2021 12:00), Max: 97.7 (20 Oct 2021 16:00)  HR: 113 (21 Oct 2021 14:00) (97 - 128)  BP: --  BP(mean): --  ABP: 107/71 (21 Oct 2021 14:00) (72/57 - 250/226)  ABP(mean): 87 (21 Oct 2021 14:00) (65 - 231)  RR: 17 (21 Oct 2021 14:00) (10 - 25)  SpO2: 100% (21 Oct 2021 14:00) (97% - 100%)      Weight in k.1 (10- @ 00:00)    I&O's Summary    20 Oct 2021 07:01  -  21 Oct 2021 07:00  --------------------------------------------------------  IN: 2450 mL / OUT: 2129 mL / NET: 321 mL    21 Oct 2021 07:01  -  21 Oct 2021 14:45  --------------------------------------------------------  IN: 620 mL / OUT: 525 mL / NET: 95 mL        Physical Exam  General: No distress. Comfortable.  Neck: +trach collar   Chest: Clear to auscultation bilaterally  CV: + VAD hum  Abdomen: Soft, non-distended, tenderness noted over epigastric region, +keotube   Neurology: Alert and oriented times three.    LVAD Interrogation  VAD TYPE HM 2  Speed 9200  Flow 5.3  Power 5.4  PI  6.3  Assessment of driveline exit site: driveline dressing c/d/i  Programming changes: no changes made    Labs:                        9.5    9.31  )-----------( 184      ( 21 Oct 2021 00:33 )             28.8     10-21    130<L>  |  94<L>  |  6<L>  ----------------------------<  97  4.5   |  24  |  0.56    Ca    9.5      21 Oct 2021 00:33  Phos  2.8     10-21  Mg     1.7     10-21    TPro  7.8  /  Alb  4.0  /  TBili  0.8  /  DBili  x   /  AST  17  /  ALT  20  /  AlkPhos  192<H>  10-21    PT/INR - ( 20 Oct 2021 01:06 )   PT: 16.3 sec;   INR: 1.38 ratio         PTT - ( 20 Oct 2021 01:06 )  PTT:28.1 sec          Lactate Dehydrogenase, Serum: 249 U/L (10-21 @ 00:33)  Lactate Dehydrogenase, Serum: 227 U/L (10-19 @ 03:30)

## 2021-10-21 NOTE — SPEAKING VALVE EVALUATION - DIAGNOSTIC IMPRESSIONS
Pt seen for passy-darren speaking valve evaluation. Pt maintained on 35% FIO2 via trach collar. Low pressure valve placed on hub of trach. Patient was dysphonic. Vocal quality hoarse/breathy/hyphonic. Pt tolerated valve relatively well. Brief desat x1 to upper 80s during coughing episode, returned to baseline quickly; per RN this is typical for him, even when not wearing PMV; otherwise, no change in vitals noted. No signs of respiratory distress. No increased work of breathing. No subjective complaints. No signs of air trapping. Valve removed after 25 minutes.

## 2021-10-21 NOTE — PROGRESS NOTE ADULT - PROBLEM SELECTOR PLAN 3
- S/p SMA stent 10/18  - currently on aspirin and plavix for stent, please f/u with vascular if only one antiplatelet agent can be used

## 2021-10-21 NOTE — PROGRESS NOTE ADULT - ASSESSMENT
65 YO M with a history of stage D NICM s/p HM2 on 9/2017 as DT (due to severe PAD) with TV ring, prior COVID-19 infection 4/2020, recurrent syncope post LVAD s/p ILR, and chronic abdominal pain with prior negative workup who was admitted 6/14/21 with symptomatic anemia with Hgb 4.5 in setting of INR 8.8 without hemodynamic instability and has since had a protracted hospitalization. He was transfused and underwent VCE which showed active bleeding in the mid small bowel but subsequent enteroscopy 6/15 did not reveal any active bleeding. He acutely decompensated after procedure with fever/hypertension, low flow alarms, and pulmonary infiltrate with hypoxia requiring intubation from probable aspiration PNA. He was unable to extubated and has since undergone tracheostomy but tolerating persistent trach collar and nearing ability for decannulation. 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 He underwent angiogram on 9/10, stent was unable to be placed and course was then complicated by RP bleed. He continued to have persistent leukocytosis and febrile episodes and was noted to have positive sputum culture for serratia marcescens and completed his course of abx. He was initially decannulated on 9/23. Recently he started having multiples of melena, emesis and went into acte respiratory distress and was ultimately re-intubated on 9/26.     He had blood cultures are positive for enterobacter cloacae/serratia, remains on IV antibiotics. He is s/p trach with ENT on 10/4. Sputum cultures positive for stenotrophomonas and blood cultures positive for serratia on bactrim. S/p SMA stent x 2 10/18. Currently tolerating tube feeds (goal rate 65 cc/hr). His overall prognosis remains guarded, now made DNR.

## 2021-10-21 NOTE — PROGRESS NOTE ADULT - SUBJECTIVE AND OBJECTIVE BOX
RICKY HAMPTON  MRN-[id number]  Patient is a 66y old  Male who presents with a chief complaint of Anemia, Supratherapeutic INR, Dark Stools (21 Oct 2021 10:31)    HPI:  65M PMH ACC/AHA stage D HF due to NICM HM2 LVAD , TV annuloplasty ring 17 as destination therapy due to severe peripheral artery disease with significant stenosis  SIADH, Depression, CKD-3 with hyperkalemia, past E. coli UTIs, driveline drainage (21) and COVID-19 (back in 2020)  He was recently seen in clinic where he complained of abdominal pain and dark stools w constipation back in May. He presents to [facility] ER today weakness and fatigue, moderate and + Black stools for three days, on coumadin secondary to warfarin use in the setting of an LVAD. Patient has required transfusions for GIB in the past. Mostly recently back in 2021 pt had anemia with dark stools. No interventions was done at that time. However Last Endoscopy was done in 2020 (negative). Today labs show patient is anemic with H/H of 4.5/16.3,. INR is 8.84 MAP in the 90s, Temp 35.1. He denies any chest pain, shortness of breath, dizziness, abd pain, nausea or vomiting.       (2021 16:57)      Surgery/Hospital Course:   admit for melena w/ anemia, INR 8.84   6/15 Capsul study (+) for small bowel bleed, balloon endoscopy (old blood in prox ileum); post EGD - septic w/ L opacity, re-intubated for concern for aspiration, TTE (Mod MR, decrease biV w/ interventricular septum boweing towards R)   bronch    +C Diff    CT C/A/P: Fluid filled colon which may be 2/2 rapid transit. Small bilateral pleffs with associates. Compressive atelectasis New ISABELLE & LLL  parenchymal opacities, suspicious for pneumonia. Moderate stenosis in the proximal superior mesenteric artery.    #8 Shiley trach at bedside    LVAD speed increased to 9200   Bronch   TC since . Patient transferred to SDU.    INR today 2.64.  H&H 7.3/24 this AM.  Will repeat CBC at noon, and will send stool guaiac Patient with persistent abdominal tenderness, rate of tube feeds decreased.  No nausea/vomiting.     INR today 2.4. H&H 9.1/28.6 low flow overnight /N&V, refusing Tube feeds on D5 1/2 normal  @50 cc/hr   INR 2.69  H&H 7.7/.1 refusing Tube feeds on D5 1/2 normal  @50 cc/hr. This am + BM Melena Dr Oneill HF  aware- PRBC x1  GI team consulted -  NPO  plan on study in am-  D/w Dr Cadet Patient  to return to CTU for further management; 1PRBCS    Post op INR 2.2 today.  No bleeding. BC + for SM.  Pt is hypotensive requiring pressor and inotropic support.  ID follow up today on Cefepime will follow.   R PTC for PTX    CT C/A/P: sub q emphysema in R chest wall, GGO RUL, small ascites CTH negative; Abd US: GB thickening, pericholecystic fluid     Perchole drain in place continues to drain total output overnight 133.  Fever today 38.8    duplex LE negative    Patient with persistent abdominal pain, refusing tube feeds and medications, Psych consulted   CTA A/P ordered to r/o mesenteric ischemia 2/2 persistent anorexia, nausea, vomiting. Revealed:  Evaluation of the mesenteric vessels is limited by streak artifact from LVAD. There appears to be severe stenosis of the proximal SMA; abdominal mesenteric doppler is recommended for further evaluation. 2.  No small bowel findings to suggest acute mesenteric ischemia. 3.  Focal dissections involving the right and left common iliac arteries.  8/15: Cultures resulted BC 1/2 +GPC in clusters, SC enterobacter; mesenteric duplex: borderline stenosis of proximal SMA  : CT C/A/P noncon: Nondular opacities in R lung apex w/cavitation, abd nl  :  Continue current care, treatment of thyrotoxicosis with medications as per endocrine, d/c ABX as per team.    RUQ sono: Contracted gallbladder with cholecystostomy tube in place.  9/10 failed SMA stent, c/b RP bleed s/p 3U PRCB   CTA Abd/Pelvis L RP hematoma    Trach decannulated    intubated fro resp distress for increased WOB, LL pneumonia; CT C/A/P: progressive ISABELLE opacities and L consolidations, multifocal pneumonia    TTE (EF 25%, decreased RV, mod MR)   10/3 VT -> Lidocaine gtt   10/4 Trach size 6 DCT cuff  10/5 CT abd (neg for intra-abd abcess, severe stenosis of prox SMA and b/l iliac arteries)  10/18 SMA Stent     Today:  Tolerating tube feeds, Vital AF @ 30cc/hr. BCx 10/14 and SCX on 10/14 +Stenotrophomonas maltophilia, c/w Bactrim, also started Vancomycin this AM. Tolerating TC since 10/19, continue as tolerated.     REVIEW OF SYSTEMS:  Speak over trach   Gen: No fever  EYES/ENT: No visual changes;  No vertigo or throat pain   NECK: No pain   RES:  No shortness of breath or Cough  CARD: No chest pain   GI: +intermittently c/o abd pain and nausea   : No dysuria  NEURO: No weakness  SKIN: No itching, rashes     ICU Vital Signs Last 24 Hrs  T(C): 36.1 (21 Oct 2021 04:00), Max: 36.5 (20 Oct 2021 16:00)  T(F): 97 (21 Oct 2021 04:00), Max: 97.7 (20 Oct 2021 16:00)  HR: 114 (21 Oct 2021 09:51) (97 - 128)  BP: --  BP(mean): --  ABP: 88/66 (21 Oct 2021 07:00) (72/57 - 250/226)  ABP(mean): 76 (21 Oct 2021 07:00) (65 - 231)  RR: 23 (21 Oct 2021 09:51) (10 - 25)  SpO2: 98% (21 Oct 2021 09:51) (97% - 100%)      Physical Exam:  Gen:  NAD  CNS: moves all extremities to command   Neck: no JVD, +trach, +central line   RES : coarse breath sounds, no wheezing    CVS: +LVAD hum   Abd: Soft. Sybil drain with bilious fluid. Positive BS throughout. Mild RUQ abdominal tenderness by perc sybil.  Skin: No rash, erythema, cyanosis.  Vasc: Warm and well-perfused.  Ext:  no edema    ============================I/O===========================   I&O's Detail    20 Oct 2021 07:01  -  21 Oct 2021 07:00  --------------------------------------------------------  IN:    dextrose 5% + sodium chloride 0.45% w/ Additives: 720 mL    Enteral Tube Flush: 40 mL    IV PiggyBack: 1450 mL    Miscellaneous Tube Feedin mL  Total IN: 2450 mL    OUT:    Drain (mL): 125 mL    Emesis (mL): 4 mL    Voided (mL): 2000 mL  Total OUT: 2129 mL    Total NET: 321 mL      21 Oct 2021 07:01  -  21 Oct 2021 11:24  --------------------------------------------------------  IN:  Total IN: 0 mL    OUT:    Drain (mL): 0 mL    Voided (mL): 125 mL  Total OUT: 125 mL    Total NET: -125 mL        ============================ LABS =========================                        9.5    9.31  )-----------( 184      ( 21 Oct 2021 00:33 )             28.8     10-21    130<L>  |  94<L>  |  6<L>  ----------------------------<  97  4.5   |  24  |  0.56    Ca    9.5      21 Oct 2021 00:33  Phos  2.8     10-21  Mg     1.7     10-21    TPro  7.8  /  Alb  4.0  /  TBili  0.8  /  DBili  x   /  AST  17  /  ALT  20  /  AlkPhos  192<H>  10-21    LIVER FUNCTIONS - ( 21 Oct 2021 00:33 )  Alb: 4.0 g/dL / Pro: 7.8 g/dL / ALK PHOS: 192 U/L / ALT: 20 U/L / AST: 17 U/L / GGT: x           PT/INR - ( 20 Oct 2021 01:06 )   PT: 16.3 sec;   INR: 1.38 ratio         PTT - ( 20 Oct 2021 01:06 )  PTT:28.1 sec  ABG - ( 20 Oct 2021 23:58 )  pH, Arterial: 7.42  pH, Blood: x     /  pCO2: 47    /  pO2: 168   / HCO3: 30    / Base Excess: 5.3   /  SaO2: 100.0               ======================Micro/Rad/Cardio=================  Culture: Reviewed   CXR: Reviewed  Echo:Reviewed  ======================================================  PAST MEDICAL & SURGICAL HISTORY:  CHF (congestive heart failure)    CAD (coronary artery disease)    Depression    Pleural effusion    History of 2019 novel coronavirus disease (COVID-19)  2020    Hemorrhoids    Bleeding hemorrhoids    Peripheral arterial disease    Claudication    BPH with urinary obstruction    ACC/AHA stage D systolic heart failure    Anticoagulation goal of INR 2.0 to 2.5    Falls    Clavicle fracture    CKD (chronic kidney disease), stage III    Iron deficiency anemia    H/O epistaxis    Vertigo    GI bleed    S/P TVR (tricuspid valve repair)    S/P ventricular assist device    S/P endoscopy      ====================ASSESSMENT ==============  Stage D Nonischemic Cardiomyopathy, Status Post HM2 on 2017    Cardiogenic shock  Hemodynamic instability   Acute hypoxemic respiratory failure s/p trach , decannulated on ; Reintubated on    GI bleed , Status Post Enteroscopy   Anemia, in setting of melena   Chronic Kidney Disease  Stress hyperglycemia   C.diff positive on    Hypovolemic shock  Septic shock  Leukocytosis  GB thickening/percholecystic s/p perc sybil by IR   SMA stenosis s/p SMA Stent on 10/18   Serratia/citrobacter pneumonia   Stenotrophomonas pneumonia   Enterobacter pneumonia   Nasuea/vomiting  Deconditioning     Plan:  ====================== NEUROLOGY=====================  Continue close monitoring of neuro status   C/w sertraline, prochlorperazine, and mirtazapine for depression     mirtazapine 7.5 milliGRAM(s) Oral daily  prochlorperazine   Tablet 10 milliGRAM(s) Oral three times a day  sertraline 100 milliGRAM(s) Oral daily    ==================== RESPIRATORY======================  Acute hypoxemic respiratory failure s/p #8 shiley trach on ; decannulated on ; Reintubated on ; trach size 6 DCT cuff on 10/4   Continue close monitoring of respiratory rate and breathing pattern, following of ABG’s with [place] monitoring, continuous pulse oximetry monitoring.   Tolerating TC since 10/19, continue as tolerated   Continue bronchodilators     Mechanical Ventilation:  Mode: vent off    albuterol/ipratropium for Nebulization 3 milliLiter(s) Nebulizer every 6 hours  guaiFENesin Oral Liquid (Sugar-Free) 200 milliGRAM(s) Oral every 4 hours PRN mucous plugging    ====================CARDIOVASCULAR==================  Stage D Nonischemic Cardiomyopathy, Status Post HM2 on 2017; LVAD settings 9200, flow 4.6  TTE 10/4:  Severely decreased LV systolic function, Diffuse hypokinesis. Mild AR. No pericardial effusion   VT on 10/4, s/p Lidocaine drip, continue close tele monitoring   ASA for recent SMA stent     aspirin  chewable 81 milliGRAM(s) Oral daily    ===================HEMATOLOGIC/ONC ===================  CTA A/P on  +L RP hematoma   Acute blood loss anemia, monitor H&H/Plts   Continue Plavix as per vascular recs for established peripheral artery disease   LUE US on 10/20 with no pseudoaneurysm    clopidogrel Tablet 75 milliGRAM(s) Oral daily    ===================== RENAL =========================  Hx of CKD, continue monitoring urine output, I&OS, BUN/Cr   Replete lytes PRN. Keep K> 4 and Mg >2    ==================== GASTROINTESTINAL===================  Tolerating tube feeds, Vital AF @ 30cc/hr, with goal 45cc/hr   CT A/P 10/5 neg for intra-abd abcess, severe stenosis of prox SMA and b/l iliac arteries  CTA A/P : Evaluation of the mesenteric vessels is limited by streak artifact from LVAD. There appears to be severe stenosis of the proximal SMA; abdominal mesenteric doppler is recommended for further evaluation. 2.  No small bowel findings to suggest acute mesenteric ischemia. 3.  Focal dissections involving the right and left common iliac arteries.  Stenosis of proximal SMA, s/p failed SMA stent c/b RP bleed on 9/10 - SMA stent with Vascular 10/18   Simethicone PRN for gas   Reglan for gut motility  Zofran for nausea     dextrose 5% + sodium chloride 0.45% with potassium chloride 10 mEq/L 1000 milliLiter(s) (30 mL/Hr) IV Continuous <Continuous>  multivitamin 1 Tablet(s) Oral daily  pantoprazole  Injectable 40 milliGRAM(s) IV Push every 12 hours  simethicone 80 milliGRAM(s) Chew every 8 hours PRN Gas  sodium chloride 0.9%. 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   CT C/A/P : progressive ISABELLE opacities and L consolidations, multifocal pneumonia  BCx 1/2 on 10/1 +Enterobacter cloacae complex, BCx  10/14+ Serratia marcescens   BCx 10/14 and SCX on 10/14 +Stenotrophomonas maltophilia, c/w Bactrim, also started Vancomycin this AM     trimethoprim / sulfamethoxazole IVPB 300 milliGRAM(s) IV Intermittent every 6 hours  vancomycin  IVPB 1000 milliGRAM(s) IV Intermittent every 12 hours        Patient requires continuous monitoring with bedside rhythm monitoring, pulse ox monitoring, and intermittent blood gas analysis. Care plan discussed with ICU care team. Patient remained critical and at risk for life threatening decompensation.     By signing my name below, Agnieszka STANLEY, attest that this documentation has been prepared under the direction and in the presence of Jaclyn Mendoza NP   Electronically signed: Cesia Shaffer, 10-21-21 @ 11:24    LIZETH, Jaclyn Mendoza, personally performed the services described in this documentation. all medical record entries made by the scribe were at my direction and in my presence. I have reviewed the chart and agree that the record reflects my personal performance and is accurate and complete  Electronically signed: Jaclyn Mendoza NP        RICKY HAMPTON  MRN-[id number]  Patient is a 66y old  Male who presents with a chief complaint of Anemia, Supratherapeutic INR, Dark Stools (21 Oct 2021 10:31)    HPI:  65M PMH ACC/AHA stage D HF due to NICM HM2 LVAD , TV annuloplasty ring 17 as destination therapy due to severe peripheral artery disease with significant stenosis  SIADH, Depression, CKD-3 with hyperkalemia, past E. coli UTIs, driveline drainage (21) and COVID-19 (back in 2020)  He was recently seen in clinic where he complained of abdominal pain and dark stools w constipation back in May. He presents to [facility] ER today weakness and fatigue, moderate and + Black stools for three days, on coumadin secondary to warfarin use in the setting of an LVAD. Patient has required transfusions for GIB in the past. Mostly recently back in 2021 pt had anemia with dark stools. No interventions was done at that time. However Last Endoscopy was done in 2020 (negative). Today labs show patient is anemic with H/H of 4.5/16.3,. INR is 8.84 MAP in the 90s, Temp 35.1. He denies any chest pain, shortness of breath, dizziness, abd pain, nausea or vomiting.       (2021 16:57)      Surgery/Hospital Course:   admit for melena w/ anemia, INR 8.84   6/15 Capsul study (+) for small bowel bleed, balloon endoscopy (old blood in prox ileum); post EGD - septic w/ L opacity, re-intubated for concern for aspiration, TTE (Mod MR, decrease biV w/ interventricular septum boweing towards R)   bronch    +C Diff    CT C/A/P: Fluid filled colon which may be 2/2 rapid transit. Small bilateral pleffs with associates. Compressive atelectasis New ISABELLE & LLL  parenchymal opacities, suspicious for pneumonia. Moderate stenosis in the proximal superior mesenteric artery.    #8 Shiley trach at bedside    LVAD speed increased to 9200   Bronch   TC since . Patient transferred to SDU.    INR today 2.64.  H&H 7.3/24 this AM.  Will repeat CBC at noon, and will send stool guaiac Patient with persistent abdominal tenderness, rate of tube feeds decreased.  No nausea/vomiting.     INR today 2.4. H&H 9.1/28.6 low flow overnight /N&V, refusing Tube feeds on D5 1/2 normal  @50 cc/hr   INR 2.69  H&H 7.7/.1 refusing Tube feeds on D5 1/2 normal  @50 cc/hr. This am + BM Melena Dr Oneill HF  aware- PRBC x1  GI team consulted -  NPO  plan on study in am-  D/w Dr Cadet Patient  to return to CTU for further management; 1PRBCS    Post op INR 2.2 today.  No bleeding. BC + for SM.  Pt is hypotensive requiring pressor and inotropic support.  ID follow up today on Cefepime will follow.   R PTC for PTX    CT C/A/P: sub q emphysema in R chest wall, GGO RUL, small ascites CTH negative; Abd US: GB thickening, pericholecystic fluid     Perchole drain in place continues to drain total output overnight 133.  Fever today 38.8    duplex LE negative    Patient with persistent abdominal pain, refusing tube feeds and medications, Psych consulted   CTA A/P ordered to r/o mesenteric ischemia 2/2 persistent anorexia, nausea, vomiting. Revealed:  Evaluation of the mesenteric vessels is limited by streak artifact from LVAD. There appears to be severe stenosis of the proximal SMA; abdominal mesenteric doppler is recommended for further evaluation. 2.  No small bowel findings to suggest acute mesenteric ischemia. 3.  Focal dissections involving the right and left common iliac arteries.  8/15: Cultures resulted BC 1/2 +GPC in clusters, SC enterobacter; mesenteric duplex: borderline stenosis of proximal SMA  : CT C/A/P noncon: Nondular opacities in R lung apex w/cavitation, abd nl  :  Continue current care, treatment of thyrotoxicosis with medications as per endocrine, d/c ABX as per team.    RUQ sono: Contracted gallbladder with cholecystostomy tube in place.  9/10 failed SMA stent, c/b RP bleed s/p 3U PRCB   CTA Abd/Pelvis L RP hematoma    Trach decannulated    intubated fro resp distress for increased WOB, LL pneumonia; CT C/A/P: progressive ISABELLE opacities and L consolidations, multifocal pneumonia    TTE (EF 25%, decreased RV, mod MR)   10/3 VT -> Lidocaine gtt   10/4 Trach size 6 DCT cuff  10/5 CT abd (neg for intra-abd abcess, severe stenosis of prox SMA and b/l iliac arteries)  10/18 SMA Stent     Today:  Tolerating tube feeds, Vital AF @ 30cc/hr. BCx 10/14 and SCX on 10/14 +Stenotrophomonas maltophilia, c/w Bactrim, and Vancomycin. Tolerating TC since 10/19, continue as tolerated. Plan for scheduled goals of care meeting this afternoon    REVIEW OF SYSTEMS:  Speak over trach   Gen: No fever  EYES/ENT: No visual changes;  No vertigo or throat pain   NECK: No pain   RES:  No shortness of breath or Cough  CARD: No chest pain   GI: +intermittently c/o abd pain and nausea   : No dysuria  NEURO: No weakness  SKIN: No itching, rashes     ICU Vital Signs Last 24 Hrs  T(C): 36.1 (21 Oct 2021 04:00), Max: 36.5 (20 Oct 2021 16:00)  T(F): 97 (21 Oct 2021 04:00), Max: 97.7 (20 Oct 2021 16:00)  HR: 114 (21 Oct 2021 09:51) (97 - 128)  BP: --  BP(mean): --  ABP: 88/66 (21 Oct 2021 07:00) (72/57 - 250/226)  ABP(mean): 76 (21 Oct 2021 07:00) (65 - 231)  RR: 23 (21 Oct 2021 09:51) (10 - 25)  SpO2: 98% (21 Oct 2021 09:51) (97% - 100%)      Physical Exam:  Gen:  NAD  CNS: moves all extremities to command   Neck: no JVD, +trach, +central line   RES : coarse breath sounds, no wheezing    CVS: +LVAD hum   Abd: Soft. Sybil drain with bilious fluid. Positive BS throughout. Mild RUQ abdominal tenderness by perc sybil.  Skin: No rash, erythema, cyanosis.  Vasc: Warm and well-perfused.  Ext:  no edema    ============================I/O===========================   I&O's Detail    20 Oct 2021 07:01  -  21 Oct 2021 07:00  --------------------------------------------------------  IN:    dextrose 5% + sodium chloride 0.45% w/ Additives: 720 mL    Enteral Tube Flush: 40 mL    IV PiggyBack: 1450 mL    Miscellaneous Tube Feedin mL  Total IN: 2450 mL    OUT:    Drain (mL): 125 mL    Emesis (mL): 4 mL    Voided (mL): 2000 mL  Total OUT: 2129 mL    Total NET: 321 mL      21 Oct 2021 07:01  -  21 Oct 2021 11:24  --------------------------------------------------------  IN:  Total IN: 0 mL    OUT:    Drain (mL): 0 mL    Voided (mL): 125 mL  Total OUT: 125 mL    Total NET: -125 mL        ============================ LABS =========================                        9.5    9.31  )-----------( 184      ( 21 Oct 2021 00:33 )             28.8     10-21    130<L>  |  94<L>  |  6<L>  ----------------------------<  97  4.5   |  24  |  0.56    Ca    9.5      21 Oct 2021 00:33  Phos  2.8     10-  Mg     1.7     10-21    TPro  7.8  /  Alb  4.0  /  TBili  0.8  /  DBili  x   /  AST  17  /  ALT  20  /  AlkPhos  192<H>  10-21    LIVER FUNCTIONS - ( 21 Oct 2021 00:33 )  Alb: 4.0 g/dL / Pro: 7.8 g/dL / ALK PHOS: 192 U/L / ALT: 20 U/L / AST: 17 U/L / GGT: x           PT/INR - ( 20 Oct 2021 01:06 )   PT: 16.3 sec;   INR: 1.38 ratio         PTT - ( 20 Oct 2021 01:06 )  PTT:28.1 sec  ABG - ( 20 Oct 2021 23:58 )  pH, Arterial: 7.42  pH, Blood: x     /  pCO2: 47    /  pO2: 168   / HCO3: 30    / Base Excess: 5.3   /  SaO2: 100.0               ======================Micro/Rad/Cardio=================  Culture: Reviewed   CXR: Reviewed  Echo:Reviewed  ======================================================  PAST MEDICAL & SURGICAL HISTORY:  CHF (congestive heart failure)    CAD (coronary artery disease)    Depression    Pleural effusion    History of 2019 novel coronavirus disease (COVID-19)  2020    Hemorrhoids    Bleeding hemorrhoids    Peripheral arterial disease    Claudication    BPH with urinary obstruction    ACC/AHA stage D systolic heart failure    Anticoagulation goal of INR 2.0 to 2.5    Falls    Clavicle fracture    CKD (chronic kidney disease), stage III    Iron deficiency anemia    H/O epistaxis    Vertigo    GI bleed    S/P TVR (tricuspid valve repair)    S/P ventricular assist device    S/P endoscopy      ====================ASSESSMENT ==============  Stage D Nonischemic Cardiomyopathy, Status Post HM2 on 2017    Cardiogenic shock  Hemodynamic instability   Acute hypoxemic respiratory failure s/p trach , decannulated on ; Reintubated on    GI bleed , Status Post Enteroscopy   Anemia, in setting of melena   Chronic Kidney Disease  Stress hyperglycemia   C.diff positive on    Hypovolemic shock  Septic shock  Leukocytosis  GB thickening/percholecystic s/p perc sybil by IR   SMA stenosis s/p SMA Stent on 10/18   Serratia/citrobacter pneumonia   Stenotrophomonas pneumonia   Enterobacter pneumonia   Nasuea/vomiting  Deconditioning     Plan:  ====================== NEUROLOGY=====================  Continue close monitoring of neuro status   C/w sertraline, prochlorperazine, and mirtazapine for depression     mirtazapine 7.5 milliGRAM(s) Oral daily  prochlorperazine   Tablet 10 milliGRAM(s) Oral three times a day  sertraline 100 milliGRAM(s) Oral daily    ==================== RESPIRATORY======================  Acute hypoxemic respiratory failure s/p #8 shiley trach on ; decannulated on ; Reintubated on ; trach size 6 DCT cuff on 10/4   Continue close monitoring of respiratory rate and breathing pattern, following of ABG’s with [place] monitoring, continuous pulse oximetry monitoring.   Tolerating TC since 10/19, continue as tolerated     Mechanical Ventilation:  Mode: vent off    albuterol/ipratropium for Nebulization 3 milliLiter(s) Nebulizer every 6 hours  guaiFENesin Oral Liquid (Sugar-Free) 200 milliGRAM(s) Oral every 4 hours PRN mucous plugging    ====================CARDIOVASCULAR==================  Stage D Nonischemic Cardiomyopathy, Status Post HM2 on 2017; LVAD settings 9200, flow 4.6  TTE 10/4:  Severely decreased LV systolic function, Diffuse hypokinesis. Mild AR. No pericardial effusion   VT on 10/4, s/p Lidocaine drip, continue close tele monitoring   ASA/Plavix for recent SMA stent     aspirin  chewable 81 milliGRAM(s) Oral daily    ===================HEMATOLOGIC/ONC ===================  CTA A/P on  +L RP hematoma   Acute blood loss anemia, monitor H&H/Plts   Continue Plavix as per vascular recs for established peripheral artery disease   LUE US on 10/20 with no pseudoaneurysm    clopidogrel Tablet 75 milliGRAM(s) Oral daily    ===================== RENAL =========================  Hx of CKD, continue monitoring urine output, I&OS, BUN/Cr   Replete lytes PRN. Keep K> 4 and Mg >2    ==================== GASTROINTESTINAL===================  Tolerating tube feeds, Vital AF @ 30cc/hr, with goal 45cc/hr   CT A/P 10/5 neg for intra-abd abcess, severe stenosis of prox SMA and b/l iliac arteries  CTA A/P : Evaluation of the mesenteric vessels is limited by streak artifact from LVAD. There appears to be severe stenosis of the proximal SMA; abdominal mesenteric doppler is recommended for further evaluation. 2.  No small bowel findings to suggest acute mesenteric ischemia. 3.  Focal dissections involving the right and left common iliac arteries.  Stenosis of proximal SMA, s/p failed SMA stent c/b RP bleed on 9/10 - SMA stent with Vascular 10/18   Simethicone PRN for gas   Reglan for gut motility  Zofran for nausea     dextrose 5% + sodium chloride 0.45% with potassium chloride 10 mEq/L 1000 milliLiter(s) (30 mL/Hr) IV Continuous <Continuous>  multivitamin 1 Tablet(s) Oral daily  pantoprazole  Injectable 40 milliGRAM(s) IV Push every 12 hours  simethicone 80 milliGRAM(s) Chew every 8 hours PRN Gas  sodium chloride 0.9%. 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   CT C/A/P : progressive ISABELLE opacities and L consolidations, multifocal pneumonia  BCx 1/2 on 10/1 +Enterobacter cloacae complex, BCx  10/14+ Serratia marcescens   BCx 10/14 and SCX on 10/14 +Stenotrophomonas maltophilia, c/w Bactrim, also started Vancomycin this AM     trimethoprim / sulfamethoxazole IVPB 300 milliGRAM(s) IV Intermittent every 6 hours  vancomycin  IVPB 1000 milliGRAM(s) IV Intermittent every 12 hours        Patient requires continuous monitoring with bedside rhythm monitoring, pulse ox monitoring, and intermittent blood gas analysis. Care plan discussed with ICU care team. Patient remained critical and at risk for life threatening decompensation.     By signing my name below, Agnieszka STANLEY, attest that this documentation has been prepared under the direction and in the presence of Jaclyn Mendoza NP   Electronically signed: Cesia Shaffer, 10-21-21 @ 11:24    LIZETH, Jaclyn Mendoza, personally performed the services described in this documentation. all medical record entries made by the scribe were at my direction and in my presence. I have reviewed the chart and agree that the record reflects my personal performance and is accurate and complete  Electronically signed: Jaclyn Mendoza NP

## 2021-10-21 NOTE — PROGRESS NOTE ADULT - ASSESSMENT
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 coli UTIs, driveline drainage (1/7/21) and COVID-19, here initially for GIB prolonged hospital course, s/p tracheostomy, acalculous cholecystitis s/p perc dawn. Patient has persistent intermittent abdominal pain, CTA showing possible proximal SMA stenosis. CTA poor quality limited by significant streak artifact. Mesenteric duplex velocities without evidence of significant stenosis, however study unreliable in the setting of patient's augmented systolic function given LVAD. Now s/p diagnostic mesenteric angiogram with unsuccessful SMA stenting. 09/12 CTA obtained due to downtrending H/H with evidence of L RP hematoma without evidence of active extravasation, L groin wound stable. H/H stable. Vascular re-consulted for potential attempt at second intervention given improved patient condition. S/p 10/18 mesenteric angiogram with x2 stents, with hematoma at brachial cutdown site    Plan:  - no PSA on u/s, read concerning for cellulitis  - No overlying skin changes or signs of infection at incision site   - no concern for infection of access site    Please follow up with Dr. Bran in 2 weeks for staple removal    Vascular Surgery  [id number]

## 2021-10-21 NOTE — PROGRESS NOTE ADULT - PROBLEM SELECTOR PLAN 2
- Stable w/o evidence of LVAD malfunction, settings as above  - H/o intolerability to AC or ECASA due to recurrent GI bleeding  - Trend daily LDH, currently stable.  - now DNR

## 2021-10-21 NOTE — PROGRESS NOTE ADULT - SUBJECTIVE AND OBJECTIVE BOX
RICKY JOINT  MRN#: [id number]  Subjective:  Pulmonary progress  : recurrent Acute hypoxic respiratory Failure ,aspiration pneumonia, NICM  ,   65M PMH ACC/AHA stage D HF due to NICM HM2 LVAD , TV annuloplasty ring 17 as destination therapy due to severe peripheral artery disease with significant stenosis  SIADH, Depression, CKD-3 with hyperkalemia, past E. coli UTIs, driveline drainage (21) and COVID-19 (back in 2020)  He was recently seen in clinic where he complained of abdominal pain and dark stools w constipation back in May. He presents to [facility] today weakness and fatigue, moderate and + Black stools for three days, on coumadin secondary to warfarin use in the setting of an LVAD. Patient has required transfusions for GIB in the past. Mostly recently back in 2021 pt had anemia with dark stools. No interventions was done at that time. However Last Endoscopy was done in 2020 (negative). Today labs show patient is anemic with H/H of 4.5/16.3,. INR is 8.84 MAP in the 90s, Temp 35.1. He denies any chest pain, shortness of breath, dizziness, abd pain, nausea or vomiting. found to have  rectal bleeding underwent endoscopy ,old blood in the proximal ileum ,  develop sepsis with LL opacity given Antibiotics , Extubated , reintubated , Bronchoscopy on Zosyn for LL pneumonia  and Amiodarone S/P TV Annuloplasty , patient remain intubated on full ventilatory support .S/P multiple units of blood transfusion , remain on full ventilatory support on Precedex and propofol , new central IJ line , diarrhea C diff. +ve on po vancomycin and IV Flagyl,  mildly distended belly , fever start on cefepime 2gm q 8 hrs S/P tracheostomy .  new RT Subclavian central line continue on contact  isolation ,S/P  C-diff antibiotics, no more diarrhea back on full support mechanical ventilator , chest x ray show improvement in LLL air space disease, more awake and responsive on tube feeding no more diarrhea ,  no nausea or vomiting or diarrhea still very weak and tired , back on tube feeding ,still on po vancomycin , getting PT and OT at bed side ,   , no SOB getting stronger , improve muscle tone patient transfer to monitor bed still on contact isolation for C-Difficel colitis on 50% FI02, and change to Suresh  tube feeding still loose stool . H/H drop significantly require blood transfusion , most likely GI bleeding , IV heparin D/C ,  H/H is stable ., patient develop TR sided  pneumothorax require chest tube placement , RT IJ central line  placed , develop fever shaking chills , blood culture positive for serratia marcescens , start on cefepime .the patient  become hypoxic and hypotensive placed on full ventilatory support and Vasopressin , levophed and Dobutamine ,S/P blood transfusion on meropenem and vancomycin ,   , on and  off pressors , occasional agitation on Precedex .S/P IR cholecystostomy tube drainage placement in the RT upper Quadrant , resume anticoagulation chest x ray noted C-PAP trail lasted only for 2 hrs , new RT SC line and D/C RT IJ line , RT pig tail cathter has been removed , tolerating C-PAP trial placed on trach. collar 50% FI02 GI consultant noted on NG tube feeding as tolerated , develop AF RVR S/P  bolus Amiodarone  back to regular sinus Rhythm , Flat Affect depressed , back on tube feeding Vital AF at 60 cc/ hr .still intermittent abdominal pain , no fever saturation is accepted  back on full ventilatory support ,   on methimazole for hyperthyroidism ,  condition is the same ,still C/O Abdominal pain , white count is improving , no chest pain or SOB ,  .placed on Reglan 10 mg TID for gastric motility , depressed , withdrawn. , S/P  mesenteric angiogram , unable to stent SMA S/P 3 units of blood transfusion   RT IJ in place reassessed for stent in the SMA by vascular,  dark stool stable H/H ,surgical opinion for possible Lap cholecystectomy. over night events noted develop respiratory distress, , rectal bleeding, require intubation placed on full ventilatory support , FI02 is 50% also became hemianosmically unstable require triple pressor support with levophed , vasopressin and norsynephrine, pan culture placed  on Vancomycin IV and PO  and Zosyn, sedated with propofol kept NPO , new central line RT and left IJ cathter placed . Diflucan Added .fever of 38.5 weaned off  vasopressin , all culture are negative, resume tube feeding ,  white count is improving , on meropenem,  and bactrim   fever adding IV vancomycin and  vancomycin solution  , and Bactrim , S/P  tracheostomy , bleeding receiving blood transfusion on vasopressin , no more bleeding , no fever , more awake and responsive ,tolerating tube feeding , ID and heart failure follow up appreciated , depresses no weaning for now , magnesium is low to give supplement too keep Above 2 , potassium is low to give supplement , patient S/P  vascular procedure for superior mesenteric artery occlusion S/P 2 stent placement , patient tolerate it very well  , left upper extremities hematoma .vascular follow up appreciated      (2021 16:57)    PAST MEDICAL & SURGICAL HISTORY:  CHF (congestive heart failure)    CAD (coronary artery disease)  Depression    Pleural effusion    History of 2019 novel coronavirus disease (COVID-19)  2020    Hemorrhoids    Bleeding hemorrhoids    Peripheral arterial disease    Claudication    BPH with urinary obstruction    ACC/AHA stage D systolic heart failure  Anticoagulation goal of INR 2.0 to 2.5    Falls    Clavicle fracture    CKD (chronic kidney disease), stage III    Iron deficiency anemia    H/O epistaxis    Vertigo    GI bleed    S/P TVR (tricuspid valve repair)    S/P ventricular assist device    S/P endoscopy    OBJECTIVE:  ICU Vital Signs Last 24 Hrs  T(C): 38.2 (2021 10:00), Max: 38.5 (2021 12:00)  T(F): 100.8 (2021 10:00), Max: 101.3 (2021 12:00)  HR: 65 (2021 10:00) (61 - 69)  BP: --  BP(mean): --  ABP: 105/67 (2021 10:00) (90/54 - 113/64)  ABP(mean): 77 (2021 10:00) (63 - 77)  RR: 20 (2021 10:00) (19 - 35)  SpO2: 99% (2021 10:00) (96% - 100%)       @ 07:  -   @ 07:00  --------------------------------------------------------  IN: 2693.9 mL / OUT: 1415 mL / NET: 1278.9 mL     @ 07:01  -   @ 10:49  --------------------------------------------------------  IN: 420.8 mL / OUT: 115 mL / NET: 305.8 mL  PHYSICAL EXAM: Daily   Elderly male  on trach. collar  FI02 is 40% S/P tracheostomy   Daily Weight in k.4 (2021 00:00)  HEENT:     + NCAT  + EOMI  - Conjuctival edema   - Icterus   - Thrush   - ETT  + NGT/OGT  Neck:         + FROM  RT IJ , LT IJ  lines JVD  - Nodes - Masses + Mid-line trachea + Tracheostomy  Chest:            normal A-P diameter    Lungs:          + CTA   + Rhonchi    - Rales    - Wheezing + Decreased  LT BS   - Dullness R L  Cardiac:       + S1 + S2    + RRR   - Irregular   - S3  - S4    - Murmurs   - Rub   - Hamman’s sign   Abdomen:    + BS  + Soft + Non-tender - Distended - Organomegaly - PEG .cholecystostomy tube in place  Extremities:   - Cyanosis U/L   - Clubbing  U/L  + LE/UE Edema LUE hematoma   + Capillary refill    + Pulses   Neuro:        - Awake   -  Alert   - Confused   - Lethargic   + Sedated  + Generalized Weakness  Skin:        - Rashes    - Erythema   + Normal incisions   + IV sites intact          HOSPITAL MEDICATIONS: All medications reviewed and analyzed  MEDICATIONS  (STANDING):  amiodarone    Tablet 200 milliGRAM(s) Oral daily  chlorhexidine 0.12% Liquid 15 milliLiter(s) Oral Mucosa every 12 hours  chlorhexidine 2% Cloths 1 Application(s) Topical <User Schedule>  dexmedetomidine Infusion 0.5 MICROgram(s)/kG/Hr (9.81 mL/Hr) IV Continuous <Continuous>  dextrose 50% Injectable 50 milliLiter(s) IV Push every 15 minutes  heparin  Infusion 400 Unit(s)/Hr (12.5 mL/Hr) IV Continuous <Continuous>  Hydromorphone  Injectable 0.5 milliGRAM(s) IV Push once  insulin lispro (ADMELOG) corrective regimen sliding scale   SubCutaneous every 6 hours  pantoprazole  Injectable 40 milliGRAM(s) IV Push every 12 hours  piperacillin/tazobactam IVPB.. 3.375 Gram(s) IV Intermittent every 8 hours  propofol Infusion 20 MICROgram(s)/kG/Min (9.42 mL/Hr) IV Continuous <Continuous>  sodium chloride 0.9% lock flush 3 milliLiter(s) IV Push every 8 hours  sodium chloride 0.9%. 1000 milliLiter(s) (10 mL/Hr) IV Continuous <Continuous>    MEDICATIONS  (PRN):  acetaminophen    Suspension .. 650 milliGRAM(s) Oral every 6 hours PRN Temp greater or equal to 38C (100.4F)    LABS: All Lab data reviewed and analyzed                          9.5    9.31  )-----------( 184      ( 21 Oct 2021 00:33 )             28.8     10-21    130<L>  |  94<L>  |  6<L>  ----------------------------<  97  4.5   |  24  |  0.56    Ca    9.5      21 Oct 2021 00:33  Phos  2.8     10-21  Mg     1.7     10-    TPro  7.8  /  Alb  4.0  /  TBili  0.8  /  DBili  x   /  AST  17  /  ALT  20  /  AlkPhos  192<H>  10-    Ca    9.6      20 Oct 2021 01:06  Phos  2.4     10-20  Mg     2.4     10-20    TPro  7.9  /  Alb  4.2  /  TBili  0.8  /  DBili  x   /  AST  17  /  ALT  22  /  AlkPhos  192<H>  10-    Ca    9.2      19 Oct 2021 14:18  Phos  2.5     10-19  Mg     1.4     10-    TPro  7.0  /  Alb  3.7  /  TBili  0.7  /  DBili  x   /  AST  19  /  ALT  21  /  AlkPhos  155<H>  10-                                                                                                                                                                        PTT - ( 2021 04:52 )  PTT:45.2 sec LIVER FUNCTIONS - ( 2021 00:42 )  Alb: 3.4 g/dL / Pro: 6.7 g/dL / ALK PHOS: 213 U/L / ALT: 15 U/L / AST: 24 U/L / GGT: x           RADIOLOGY: - Reviewed and analyzed RT Pig tail cathter  , LVAD HM2, CT scan of abdomen reviewed result noted

## 2021-10-21 NOTE — PROGRESS NOTE ADULT - ASSESSMENT
67 YO M with a history of stage D NICM s/p HM2 on 9/2017 as DT (due to severe PAD) with TV ring, prior COVID-19 infection 4/2020, recurrent syncope post LVAD s/p ILR, and chronic abdominal pain with prior negative workup who was admitted 6/14/21 with symptomatic anemia with Hgb 4.5 in setting of INR 8.8 without hemodynamic instability and has since had a protracted hospitalization. He was transfused and underwent VCE which showed active bleeding in the mid small bowel but subsequent enteroscopy 6/15 did not reveal any active bleeding. He acutely decompensated after procedure with fever/hypertension, low flow alarms, and pulmonary infiltrate with hypoxia requiring intubation from probable aspiration PNA. He was unable to extubated and has since undergone tracheostomy but tolerating persistent trach collar and nearing ability for decannulation. 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 He underwent angiogram on 9/10, stent was unable to be placed and course was then complicated by RP bleed. He continued to have persistent leukocytosis and febrile episodes and was noted to have positive sputum culture for serratia marcescens and completed his course of abx. He was initially decannulated on 9/23. Recently he started having multiples of melena, emesis and went into acte respiratory distress and was ultimately re-intubated on 9/26.     He had blood cultures are positive for enterobacter cloacae/serratia, remains on IV antibiotics. He is s/p trach with ENT on 10/4. Sputum cultures positive for stenotrophomonas and blood cultures positive for serratia on bactrim. S/p SMA stent x 2 10/18.   Early evidence of clearing of serratia bacteremia    Morris Prater MD  [phone]  After 5pm/weekends [phone]

## 2021-10-21 NOTE — PROGRESS NOTE ADULT - PROBLEM SELECTOR PLAN 5
- S/p trach 10/4 by ENT, consent given by HCP sister.  - currently tolerating trach collar  - Of note if patient becomes hypoxic or SOB, he is not to be placed on vent

## 2021-10-21 NOTE — CHART NOTE - NSCHARTNOTEFT_GEN_A_CORE
Nutrition Chart Note.   Pt seen for Nutrition follow-up    Chart reviewed, events noted. 66M, PMH ACC/AHA stage D HF 2/2 NICM s/p HM2 LVAD (2017). Here initially for GIB prolonged hospital course, s/p tracheostomy, acalculous cholecystitis s/p perc dawn. Patient c persistent intermittent abdominal pain - found with SMA stenosis on mesenteric angiogram on 9/10, however, unable to place stent. CTA Abd/Pelvis () reveals L RP hematoma. Was tolerating TC and decannulated , transferred to [place] , had multiple episodes of vomiting and went into acute respiratory distress to which he was subsequently intubated. CT Scan  showed multifocal PNA; recultured on  and positive enterobacter cloacae - on abx. S/p trach placement 10/4. S/p 10/18 mesenteric angiogram with x2 stents, with hematoma at brachial cutdown site.    Source: EMR, Team    Diet, NPO with Tube Feed:   Tube Feeding Modality: Nasogastric  Vital AF (VITALAFRTH)  Total Volume for 24 Hours (mL): 1080  Continuous  Starting Tube Feed Rate {mL per Hour}: 10  Increase Tube Feed Rate by (mL): 10     Every 6 hours  Until Goal Tube Feed Rate (mL per Hour): 45  Tube Feed Duration (in Hours): 24  Tube Feed Start Time: 11:00 (10-20-21 @ 10:50)    EN order provides: 1296kcals, 81g protein, 876ml free H2O    Current Pump Rate: 30ml/hr  EN provisions (per chart):     (10/20) 6% EN goal volume achieved - EN reinitiated @ 18:00      (10/19) 11% EN goal volume achieved - EN reinitiated @ 8:00 and stopped @ 18:00 in setting of emesis  - EN was previously last infusing on 10/8 and had been held as of 22:00 that day    Nutrition-Related Events:   - Team aware of Pt's risk for refeeding syndrome; electrolytes being closely monitored and replenished PRN  - Dextrose 5% + NaCl infusing @30ml/hr  - Insulin Sliding Scale ordered to optimize BG; noted currently receiving steroid  - Continues with Multivitamin & thiamin supplementation ordered daily  - Pt previously ordered for Vital 1.5 with goal of 40, yet had team was aware goal was 65ml/hr as pt has not been able to advance past goal of 40ml/hr    GI:  Last BM 10/15.   Bowel Regimen? [] Yes   [x] No  - Emesis noted 10/19 & 10/20  - Noted on abx course at this time  - Reglan ordered as well as Zofran (PRN)    Weight in k.1 (10-21), 59.7 (10-20), 55.1 (10-19), 55.3 (10-18), 54.6 (10-17), 55.9 (10-16), 56.4 (10-15), 57.3 (10-13), 59.8 (10-04), 62.7 (10-02), 62.1 (), 65.7 (09-15), 58.4 (), 66.2 (), 71.2 ()    Weight history:   - Admission weight: 176.3 pounds / 80 kg (6/15)    MEDICATIONS  (STANDING):  dextrose 40% Gel  dextrose 5% + sodium chloride 0.45% with potassium chloride 10 mEq/L  dextrose 50% Injectable  insulin lispro (ADMELOG) corrective regimen sliding scale  methimazole  multivitamin  pantoprazole  Injectable  predniSONE   Tablet  sodium chloride 0.9%.  thiamine  trimethoprim / sulfamethoxazole IVPB  vancomycin  IVPB    Pertinent Labs: 10-21 @ 00:33: Na 130<L>, BUN 6<L>, Cr 0.56, BG 97, K+ 4.5, Phos 2.8, Mg 1.7, Alk Phos 192<H>, ALT/SGPT 20, AST/SGOT 17, HbA1c --    A1C with Estimated Average Glucose Result: 5.4 % (08-15-21 @ 13:52)  A1C with Estimated Average Glucose Result: 5.6 % (06-15-21 @ 02:42)    Finger Sticks:  POCT Blood Glucose.: 117 mg/dL (10-21 @ 05:59)  POCT Blood Glucose.: 97 mg/dL (10-20 @ 17:12)  POCT Blood Glucose.: 131 mg/dL (10-20 @ 13:19)    Skin per nursing flowsheets: L upper arm wound; no pressure injuries noted  Edema: none noted    Estimated Nutrition Needs:   Using dosing weight 78.5 kg  Energy Needs (25-30 kcal/kg): 0923-0201 kcal/day  Protein Needs (1.2-1.4 g/kg):     Previous Nutrition Diagnosis: Severe chronic malnutrition  Nutrition diagnosis is ongoing & addressed with tube feeds as tolerated    No new nutrition diagnosis at this time    Recommendations:    - Pt without documented BM since 10/15, noted feeds were held during this time; may consider addition of bowel regimen - defer to team    Monitoring and Evaluation:   Continue to monitor Nutritional intake, Tolerance to diet prescription, weights, labs, skin integrity  RD remains available upon request and will follow up per protocol     Wendy Travis, MS, RD, CDN, CNSC  pager #[phone] Nutrition Chart Note.   Pt seen for Nutrition follow-up    Chart reviewed, events noted. 66M, PMH ACC/AHA stage D HF 2/2 NICM s/p HM2 LVAD (2017). Here initially for GIB prolonged hospital course, s/p tracheostomy, acalculous cholecystitis s/p perc dawn. Patient c persistent intermittent abdominal pain - found with SMA stenosis on mesenteric angiogram on 9/10, however, unable to place stent. CTA Abd/Pelvis () reveals L RP hematoma. Was tolerating TC and decannulated , transferred [place] , had multiple episodes of vomiting and went into acute respiratory distress to which he was subsequently intubated. CT Scan  showed multifocal PNA; recultured on  and positive enterobacter cloacae - on abx. S/p trach placement 10/4. S/p 10/18 mesenteric angiogram with x2 stents, with hematoma at brachial cutdown site. now made DNR.     Source: EMR, Team    Diet, NPO with Tube Feed:   Tube Feeding Modality: Nasogastric  Vital AF (VITALAFRTH)  Total Volume for 24 Hours (mL): 1080  Continuous  Starting Tube Feed Rate {mL per Hour}: 10  Increase Tube Feed Rate by (mL): 10     Every 6 hours  Until Goal Tube Feed Rate (mL per Hour): 45  Tube Feed Duration (in Hours): 24  Tube Feed Start Time: 11:00 (10-20-21 @ 10:50)    EN order provides: 1296kcals, 81g protein, 876ml free H2O    Current Pump Rate: 30ml/hr  EN provisions (per chart):     (10/20) 6% EN goal volume achieved - EN reinitiated @ 18:00      (10/19) 11% EN goal volume achieved - EN reinitiated @ 8:00 and stopped @ 18:00 in setting of emesis  - EN was previously last infusing on 10/8 and had been held as of 22:00 that day    Nutrition-Related Events:   - Team aware of Pt's risk for refeeding syndrome; electrolytes being closely monitored and replenished PRN  - Dextrose 5% + NaCl infusing @30ml/hr  - Insulin Sliding Scale ordered to optimize BG; noted currently receiving steroid  - Continues with Multivitamin & thiamin supplementation ordered daily  - Pt previously ordered for Vital 1.5 with goal of 40, yet had team was aware goal was 65ml/hr as pt has not been able to advance past goal of 40ml/hr    GI:  Last BM 10/15.   Bowel Regimen? [] Yes   [x] No  - Emesis noted 10/19 & 10/20  - Noted on abx course at this time  - Reglan ordered as well as Zofran (PRN)    Weight in k.1 (10-21), 59.7 (10-20), 55.1 (10-19), 55.3 (10-18), 54.6 (10-17), 55.9 (10-), 56.4 (10-15), 57.3 (10-13), 59.8 (10-04), 62.7 (10-02), 62.1 (), 65.7 (09-15), 58.4 (), 66.2 (), 71.2 ()    Weight history:   - Admission weight: 176.3 pounds / 80 kg (6/15)    MEDICATIONS  (STANDING):  dextrose 40% Gel  dextrose 5% + sodium chloride 0.45% with potassium chloride 10 mEq/L  dextrose 50% Injectable  insulin lispro (ADMELOG) corrective regimen sliding scale  methimazole  multivitamin  pantoprazole  Injectable  predniSONE   Tablet  sodium chloride 0.9%.  thiamine  trimethoprim / sulfamethoxazole IVPB  vancomycin  IVPB    Pertinent Labs: 10-21 @ 00:33: Na 130<L>, BUN 6<L>, Cr 0.56, BG 97, K+ 4.5, Phos 2.8, Mg 1.7, Alk Phos 192<H>, ALT/SGPT 20, AST/SGOT 17, HbA1c --    A1C with Estimated Average Glucose Result: 5.4 % (08-15-21 @ 13:52)  A1C with Estimated Average Glucose Result: 5.6 % (06-15-21 @ 02:42)    Finger Sticks:  POCT Blood Glucose.: 117 mg/dL (10-21 @ 05:59)  POCT Blood Glucose.: 97 mg/dL (10-20 @ 17:12)  POCT Blood Glucose.: 131 mg/dL (10-20 @ 13:19)    Skin per nursing flowsheets: L upper arm wound; no pressure injuries noted  Edema: none noted    Estimated Nutrition Needs:   Recalculated based on most recent weight 53.1kg (10/21)  Energy Needs (33-38 kcals/kg): 5847-8730  Protein Needs (1.6-2.0 g/kg):     Previous Nutrition Diagnosis: Severe chronic malnutrition  Nutrition diagnosis is ongoing & addressed with tube feeds as tolerated    No new nutrition diagnosis at this time    Recommendations:    1. Continue Vital AF and as medically feasible/tolerated recommend increasing to goal rate of 60ml/hr x 24hrs. A goal to provide 1440ml formula, 1728kcals, 108g protein, 1168ml free H2O (meets 33kcals/kg & 2.0g protein/kg based on 10/21 wt 53.1kg).  2. Continue micronutrient supplementation as ordered.  3. Monitor electrolytes (specifically K+, Phos, and Mg) and replenish aggressively. Team aware of pt's risk for refeeding syndrome.  4. Pt without documented BM since 10/15, noted feeds were held during this time; may consider addition of bowel regimen - defer to team  5. RD to remain available to adjust EN formulary, volume/rate PRN.     Monitoring and Evaluation:   Continue to monitor Nutritional intake, Tolerance to diet prescription, weights, labs, skin integrity  RD remains available upon request and will follow up per protocol     Wendy Travis, MS, RD, CDN, [facility]  pager #[phone]

## 2021-10-21 NOTE — PROGRESS NOTE ADULT - SUBJECTIVE AND OBJECTIVE BOX
Vascular Surgery    SUBJECTIVE: Pt seen and examined on rounds with team. No acute events overnight.        OBJECTIVE    PHYSICAL EXAM:  Gen: NAD, well-developed, responsive   HEENT: trach collar  Abd: soft, tender to deep palpation  Extremities: Upper extremity access site with palpable hematoma, stable from yesterday. Distal pulses palpable. Neurologically intact. No overlying erythema or cellulitic changes    VITALS  T(C): 36.1 (10-21-21 @ 04:00), Max: 36.5 (10-20-21 @ 16:00)  HR: 114 (10-21-21 @ 09:51) (97 - 128)  BP: --  RR: 23 (10-21-21 @ 09:51) (10 - 25)  SpO2: 98% (10-21-21 @ 09:51) (97% - 100%)  CAPILLARY BLOOD GLUCOSE      POCT Blood Glucose.: 117 mg/dL (21 Oct 2021 05:59)  POCT Blood Glucose.: 97 mg/dL (20 Oct 2021 17:12)  POCT Blood Glucose.: 131 mg/dL (20 Oct 2021 13:19)      Is/Os    10-20 @ 07:  -  10-21 @ 07:00  --------------------------------------------------------  IN:    dextrose 5% + sodium chloride 0.45% w/ Additives: 720 mL    Enteral Tube Flush: 40 mL    IV PiggyBack: 1450 mL    Miscellaneous Tube Feedin mL  Total IN: 2450 mL    OUT:    Drain (mL): 125 mL    Emesis (mL): 4 mL    Voided (mL): 2000 mL  Total OUT: 2129 mL    Total NET: 321 mL      10-21 @ 07:  -  10-21 @ 10:31  --------------------------------------------------------  IN:  Total IN: 0 mL    OUT:    Drain (mL): 0 mL    Voided (mL): 125 mL  Total OUT: 125 mL    Total NET: -125 mL          MEDICATIONS (STANDING): albuterol/ipratropium for Nebulization 3 milliLiter(s) Nebulizer every 6 hours  aspirin  chewable 81 milliGRAM(s) Oral daily  clopidogrel Tablet 75 milliGRAM(s) Oral daily  dextrose 40% Gel 15 Gram(s) Oral once  dextrose 5% + sodium chloride 0.45% with potassium chloride 10 mEq/L 1000 milliLiter(s) IV Continuous <Continuous>  dextrose 50% Injectable 12.5 Gram(s) IV Push once  insulin lispro (ADMELOG) corrective regimen sliding scale   SubCutaneous every 6 hours  methimazole 20 milliGRAM(s) Oral <User Schedule>  metoclopramide Injectable 10 milliGRAM(s) IV Push every 8 hours  mirtazapine 7.5 milliGRAM(s) Oral daily  multivitamin 1 Tablet(s) Oral daily  ondansetron Injectable 8 milliGRAM(s) IV Push every 8 hours  pantoprazole  Injectable 40 milliGRAM(s) IV Push every 12 hours  predniSONE   Tablet 15 milliGRAM(s) Oral every 24 hours  prochlorperazine   Tablet 10 milliGRAM(s) Oral three times a day  sertraline 100 milliGRAM(s) Oral daily  sodium chloride 0.9%. 1000 milliLiter(s) IV Continuous <Continuous>  thiamine 100 milliGRAM(s) Oral daily  trimethoprim / sulfamethoxazole IVPB 300 milliGRAM(s) IV Intermittent every 6 hours  vancomycin  IVPB 1000 milliGRAM(s) IV Intermittent every 12 hours    MEDICATIONS (PRN):guaiFENesin Oral Liquid (Sugar-Free) 200 milliGRAM(s) Oral every 4 hours PRN mucous plugging  simethicone 80 milliGRAM(s) Chew every 8 hours PRN Gas      LABS  CBC (10-21 @ 00:33)                              9.5<L>                         9.31    )----------------(  184        --    % Neutrophils, --    % Lymphocytes, ANC: --                                  28.8<L>  CBC (10-20 @ 01:06)                              10.3<L>                         14.92<H>  )----------------(  233        --    % Neutrophils, --    % Lymphocytes, ANC: --                                  31.8<L>    BMP (10-21 @ 00:33)             130<L>  |  94<L>   |  6<L>  		Ca++ --      Ca 9.5                ---------------------------------( 97    		Mg 1.7                4.5     |  24      |  0.56  			Ph 2.8     BMP (10-20 @ 01:06)             129<L>  |  92<L>   |  5<L>  		Ca++ --      Ca 9.6                ---------------------------------( 99    		Mg 2.4                4.3     |  24      |  0.53  			Ph 2.4<L>    LFTs (10-21 @ 00:33)      TPro 7.8 / Alb 4.0 / TBili 0.8 / DBili -- / AST 17 / ALT 20 / AlkPhos 192<H>  LFTs (10-20 @ 01:06)      TPro 7.9 / Alb 4.2 / TBili 0.8 / DBili -- / AST 17 / ALT 22 / AlkPhos 192<H>    Coags (10-20 @ 01:06)  aPTT 28.1 / INR 1.38<H> / PT 16.3<H>      ABG (10-20 @ 23:58)     7.42 / 47 / 168<H> / 30<H> / 5.3<H> / 100.0<H>%     Lactate:    ABG (10-20 @ 01:00)     7.41 / 45 / 162<H> / 28 / 3.3<H> / 100.0<H>%     Lactate:          IMAGING STUDIES

## 2021-10-21 NOTE — SPEAKING VALVE EVALUATION - H & P REVIEW
yes 66M PMH ACC/AHA stage D HF due to NICM HM2 LVAD , TV annuloplasty ring 9/12/17 as destination therapy due to severe peripheral artery disease with significant stenosis  SIADH, Depression, CKD-3 with hyperkalemia, past E. coli UTIs, driveline drainage (1/7/21) and COVID-19 (April 2020). Seen in clinic for c/o abdominal pain and dark stools w constipation in May. He presents to [facility] ER 6/14 w/ weakness, fatigue, and + Black stools x3 days, warfarin use in setting of  LVAD. Pt has required transfusions for GIB in past. Most recently in jan 2021 pt had anemia with dark stools. No interventions at that time. Last Endoscopy done in July 2020 (negative). 6/14: admitted for melena w/ anemia, INR 8.84/yes

## 2021-10-21 NOTE — PROGRESS NOTE ADULT - ATTENDING COMMENTS
Reports persistence of abdominal pain. No further nausea/vomiting. Unclear if it's cough related.   H/H slowly dropping, should monitor for bleeding as pt is on DAPT and has h/o GI bleed.  Extensive discussion today with patient, his sister, palliative, CTS team and . Patient agreed with DNR/DNI (will not place back on mechanical ventilation), currently not interested in hospice.

## 2021-10-22 NOTE — PROGRESS NOTE ADULT - PROBLEM SELECTOR PLAN 2
- Stable w/o evidence of LVAD malfunction, settings as above  - H/o intolerability to AC or ECASA due to recurrent GI bleeding  - Trend daily LDH, slowly up-trending   - now DNR - Stable w/o evidence of LVAD malfunction, settings as above  - H/o recurrent GI bleeding  - Trend daily LDH, slowly up-trending   - now DNR

## 2021-10-22 NOTE — PROGRESS NOTE ADULT - SUBJECTIVE AND OBJECTIVE BOX
Subjective: Patient seen and examined resting in bed.     Medications:  aspirin  chewable 81 milliGRAM(s) Oral daily  clopidogrel Tablet 75 milliGRAM(s) Oral daily  dextrose 40% Gel 15 Gram(s) Oral once  dextrose 5% + sodium chloride 0.45% with potassium chloride 10 mEq/L 1000 milliLiter(s) IV Continuous <Continuous>  dextrose 50% Injectable 12.5 Gram(s) IV Push once  fentaNYL    Injectable 12 MICROGram(s) IV Push every 3 hours PRN  guaiFENesin Oral Liquid (Sugar-Free) 200 milliGRAM(s) Oral every 4 hours PRN  insulin lispro (ADMELOG) corrective regimen sliding scale   SubCutaneous every 6 hours  methimazole 20 milliGRAM(s) Oral <User Schedule>  metoclopramide Injectable 10 milliGRAM(s) IV Push every 8 hours  mirtazapine 7.5 milliGRAM(s) Oral daily  multivitamin 1 Tablet(s) Oral daily  ondansetron Injectable 8 milliGRAM(s) IV Push every 8 hours  pantoprazole  Injectable 40 milliGRAM(s) IV Push every 12 hours  predniSONE   Tablet 15 milliGRAM(s) Oral every 24 hours  prochlorperazine   Tablet 10 milliGRAM(s) Oral three times a day  sertraline 100 milliGRAM(s) Oral daily  simethicone 80 milliGRAM(s) Chew every 8 hours PRN  sodium chloride 0.9%. 1000 milliLiter(s) IV Continuous <Continuous>  thiamine 100 milliGRAM(s) Oral daily  trimethoprim / sulfamethoxazole IVPB 300 milliGRAM(s) IV Intermittent every 6 hours  vancomycin  IVPB 1000 milliGRAM(s) IV Intermittent every 12 hours      ICU Vital Signs Last 24 Hrs  T(C): 36.6 (22 Oct 2021 04:00), Max: 36.6 (22 Oct 2021 04:00)  T(F): 97.8 (22 Oct 2021 04:00), Max: 97.8 (22 Oct 2021 04:00)  HR: 122 (22 Oct 2021 07:00) (104 - 124)  BP: --  BP(mean): --  ABP: 120/77 (22 Oct 2021 07:00) (65/60 - 348/348)  ABP(mean): 96 (22 Oct 2021 07:00) (62 - 348)  RR: 22 (22 Oct 2021 07:00) (10 - )  SpO2: 100% (22 Oct 2021 07:00) (94% - 100%)    Weight in k.7 (10-22 @ 00:00)    I&O's Summary    21 Oct 2021 07:01  -  22 Oct 2021 07:00  --------------------------------------------------------  IN: 2000 mL / OUT: 1146 mL / NET: 854 mL        Physical Exam  General: No distress. Comfortable.  Neck: +trach collar  Chest: Clear to auscultation bilaterally  CV: +VAD hum  Abdomen: Soft, non-distended, tenderness noted over epigastric region, +keotube, +billary drain  Neurology: Alert and oriented times three.    LVAD Interrogation  VAD TYPE HM 2  Speed 9200  Flow 5.4  Power 5.7  PI  5.9  Assessment of driveline exit site: driveline dressing c/d/i  Programming changes: no changes made    Labs:                        8.8    18.67 )-----------( 230      ( 22 Oct 2021 01:33 )             26.9     10    128<L>  |  90<L>  |  11  ----------------------------<  101<H>  4.3   |  25  |  0.61    Ca    9.2      22 Oct 2021 01:08  Phos  2.7     10-22  Mg     1.8     10-22    TPro  7.5  /  Alb  3.8  /  TBili  0.7  /  DBili  x   /  AST  15  /  ALT  18  /  AlkPhos  206<H>  10-22              Lactate Dehydrogenase, Serum: 276 U/L (10-22 @ 01:08)  Lactate Dehydrogenase, Serum: 249 U/L (10-21 @ 00:33)

## 2021-10-22 NOTE — PROGRESS NOTE ADULT - ATTENDING COMMENTS
Worsening leukocytosis. No fevers.   HAd desaturation when speaking valve was tried. ?Aspiration. HE is already on antibiotics.   Blood cultures to be drawn today.   Close monitoring of H/H, has been downtrending since DAPT started.   May need to start NS for hypovolemic/hyponatremia. HE was on D5+1/2NS.  Family meeting yesterday with the patient, his sister, palliative team and CTS team. Pt agreed to DNR/DNI (will not be connected to ventilatory). He is currently not interested in hospice.

## 2021-10-22 NOTE — PROGRESS NOTE ADULT - PROBLEM SELECTOR PLAN 3
- S/p SMA stent 10/18, currently on aspirin and plavix for stent - S/p SMA stent 10/18, currently on aspirin and plavix for stent. Monitor for bleeding

## 2021-10-22 NOTE — PROGRESS NOTE ADULT - PROBLEM SELECTOR PLAN 4
- on and off tube feeds due to nausea/vomiting - tube feeds continue to be placed on and off due to nausea/vomiting  - will resume when feasiable - tube feeds continue to be placed on and off due to nausea/vomiting  - will resume when feasible

## 2021-10-22 NOTE — PROGRESS NOTE ADULT - SUBJECTIVE AND OBJECTIVE BOX
RICKY JOINT  MRN#: [id number]  Subjective:  Pulmonary progress  : recurrent Acute hypoxic respiratory Failure ,aspiration pneumonia, NICM  ,   65M PMH ACC/AHA stage D HF due to NICM HM2 LVAD , TV annuloplasty ring 17 as destination therapy due to severe peripheral artery disease with significant stenosis  SIADH, Depression, CKD-3 with hyperkalemia, past E. coli UTIs, driveline drainage (21) and COVID-19 (back in 2020)  He was recently seen in clinic where he complained of abdominal pain and dark stools w constipation back in May. He presents to [facility] ER today weakness and fatigue, moderate and + Black stools for three days, on coumadin secondary to warfarin use in the setting of an LVAD. Patient has required transfusions for GIB in the past. Mostly recently back in 2021 pt had anemia with dark stools. No interventions was done at that time. However Last Endoscopy was done in 2020 (negative). Today labs show patient is anemic with H/H of 4.5/16.3,. INR is 8.84 MAP in the 90s, Temp 35.1. He denies any chest pain, shortness of breath, dizziness, abd pain, nausea or vomiting. found to have  rectal bleeding underwent endoscopy ,old blood in the proximal ileum ,  develop sepsis with LL opacity given Antibiotics , Extubated , reintubated , Bronchoscopy on Zosyn for LL pneumonia  and Amiodarone S/P TV Annuloplasty , patient remain intubated on full ventilatory support .S/P multiple units of blood transfusion , remain on full ventilatory support on Precedex and propofol , new central IJ line , diarrhea C diff. +ve on po vancomycin and IV Flagyl,  mildly distended belly , fever start on cefepime 2gm q 8 hrs S/P tracheostomy .  new RT Subclavian central line continue on contact  isolation ,S/P  C-diff antibiotics, no more diarrhea back on full support mechanical ventilator , chest x ray show improvement in LLL air space disease, more awake and responsive on tube feeding no more diarrhea ,  no nausea or vomiting or diarrhea still very weak and tired , back on tube feeding ,still on po vancomycin , getting PT and OT at bed side ,   , no SOB getting stronger , improve muscle tone patient transfer to monitor bed still on contact isolation for C-Difficel colitis on 50% FI02, and change to Suresh  tube feeding still loose stool . H/H drop significantly require blood transfusion , most likely GI bleeding , IV heparin D/C ,  H/H is stable ., patient develop TR sided  pneumothorax require chest tube placement , RT IJ central line  placed , develop fever shaking chills , blood culture positive for serratia marcescens , start on cefepime .the patient  become hypoxic and hypotensive placed on full ventilatory support and Vasopressin , levophed and Dobutamine ,S/P blood transfusion on meropenem and vancomycin ,   , on and  off pressors , occasional agitation on Precedex .S/P IR cholecystostomy tube drainage placement in the RT upper Quadrant , resume anticoagulation chest x ray noted C-PAP trail lasted only for 2 hrs , new RT SC line and D/C RT IJ line , RT pig tail cathter has been removed , tolerating C-PAP trial placed on trach. collar 50% FI02 GI consultant noted on NG tube feeding as tolerated , develop AF RVR S/P  bolus Amiodarone  back to regular sinus Rhythm , Flat Affect depressed , back on tube feeding Vital AF at 60 cc/ hr .still intermittent abdominal pain , no fever saturation is accepted  back on full ventilatory support ,   on methimazole for hyperthyroidism ,  condition is the same ,still C/O Abdominal pain , white count is improving , no chest pain or SOB ,  .placed on Reglan 10 mg TID for gastric motility , depressed , withdrawn. , S/P  mesenteric angiogram , unable to stent SMA S/P 3 units of blood transfusion   RT IJ in place reassessed for stent in the SMA by vascular,  dark stool stable H/H ,surgical opinion for possible Lap cholecystectomy. over night events noted develop respiratory distress, , rectal bleeding, require intubation placed on full ventilatory support , FI02 is 50% also became hemianosmically unstable require triple pressor support with levophed , vasopressin and norsynephrine, pan culture placed  on Vancomycin IV and PO  and Zosyn, sedated with propofol kept NPO , new central line RT and left IJ cathter placed . Diflucan Added .fever of 38.5 weaned off  vasopressin ,   fever adding IV vancomycin and  vancomycin solution  , and Bactrim , S/P  tracheostomy , bleeding receiving blood transfusion on vasopressin , no more bleeding , no fever , more awake and responsive ,tolerating tube feeding , ID and heart failure follow up appreciated , depresses no weaning for now , magnesium is low to give supplement too keep Above 2 , patient S/P  vascular procedure for superior mesenteric artery occlusion S/P 2 stent placement , patient tolerate it very well  , left upper extremities hematoma .vascular follow up appreciated , increase WBC      (2021 16:57)    PAST MEDICAL & SURGICAL HISTORY:  CHF (congestive heart failure)    CAD (coronary artery disease)  Depression    Pleural effusion    History of 2019 novel coronavirus disease (COVID-19)  2020    Hemorrhoids    Bleeding hemorrhoids    Peripheral arterial disease    Claudication    BPH with urinary obstruction    ACC/AHA stage D systolic heart failure  Anticoagulation goal of INR 2.0 to 2.5    Falls    Clavicle fracture    CKD (chronic kidney disease), stage III    Iron deficiency anemia    H/O epistaxis    Vertigo    GI bleed    S/P TVR (tricuspid valve repair)    S/P ventricular assist device    S/P endoscopy    OBJECTIVE:  ICU Vital Signs Last 24 Hrs  T(C): 38.2 (2021 10:00), Max: 38.5 (2021 12:00)  T(F): 100.8 (2021 10:00), Max: 101.3 (2021 12:00)  HR: 65 (2021 10:00) (61 - 69)  BP: --  BP(mean): --  ABP: 105/67 (2021 10:00) (90/54 - 113/64)  ABP(mean): 77 (2021 10:00) (63 - 77)  RR: 20 (2021 10:00) (19 - 35)  SpO2: 99% (2021 10:00) (96% - 100%)       @ 07:  -   @ 07:00  --------------------------------------------------------  IN: 2693.9 mL / OUT: 1415 mL / NET: 1278.9 mL     @ 07:01  -   @ 10:49  --------------------------------------------------------  IN: 420.8 mL / OUT: 115 mL / NET: 305.8 mL  PHYSICAL EXAM: Daily   Elderly male  on trach. collar  FI02 is 40% S/P tracheostomy   Daily Weight in k.4 (2021 00:00)  HEENT:     + NCAT  + EOMI  - Conjuctival edema   - Icterus   - Thrush   - ETT  + NGT/OGT  Neck:         + FROM  RT IJ , LT IJ  lines JVD  - Nodes - Masses + Mid-line trachea + Tracheostomy  Chest:            normal A-P diameter    Lungs:          + CTA   + Rhonchi    - Rales    - Wheezing + Decreased  LT BS   - Dullness R L  Cardiac:       + S1 + S2    + RRR   - Irregular   - S3  - S4    - Murmurs   - Rub   - Hamman’s sign   Abdomen:    + BS  + Soft + Non-tender - Distended - Organomegaly - PEG .cholecystostomy tube in place  Extremities:   - Cyanosis U/L   - Clubbing  U/L  + LE/UE Edema LUE hematoma   + Capillary refill    + Pulses   Neuro:        - Awake   -  Alert   - Confused   - Lethargic   + Sedated  + Generalized Weakness  Skin:        - Rashes    - Erythema   + Normal incisions   + IV sites intact          HOSPITAL MEDICATIONS: All medications reviewed and analyzed  MEDICATIONS  (STANDING):  amiodarone    Tablet 200 milliGRAM(s) Oral daily  chlorhexidine 0.12% Liquid 15 milliLiter(s) Oral Mucosa every 12 hours  chlorhexidine 2% Cloths 1 Application(s) Topical <User Schedule>  dexmedetomidine Infusion 0.5 MICROgram(s)/kG/Hr (9.81 mL/Hr) IV Continuous <Continuous>  dextrose 50% Injectable 50 milliLiter(s) IV Push every 15 minutes  heparin  Infusion 400 Unit(s)/Hr (12.5 mL/Hr) IV Continuous <Continuous>  Hydromorphone  Injectable 0.5 milliGRAM(s) IV Push once  insulin lispro (ADMELOG) corrective regimen sliding scale   SubCutaneous every 6 hours  pantoprazole  Injectable 40 milliGRAM(s) IV Push every 12 hours  piperacillin/tazobactam IVPB.. 3.375 Gram(s) IV Intermittent every 8 hours  propofol Infusion 20 MICROgram(s)/kG/Min (9.42 mL/Hr) IV Continuous <Continuous>  sodium chloride 0.9% lock flush 3 milliLiter(s) IV Push every 8 hours  sodium chloride 0.9%. 1000 milliLiter(s) (10 mL/Hr) IV Continuous <Continuous>    MEDICATIONS  (PRN):  acetaminophen    Suspension .. 650 milliGRAM(s) Oral every 6 hours PRN Temp greater or equal to 38C (100.4F)    LABS: All Lab data reviewed and analyzed                                      8.8    18.67 )-----------( 230      ( 22 Oct 2021 01:33 )             26.9    10-    128<L>  |  90<L>  |  11  ----------------------------<  101<H>  4.3   |  25  |  0.61    Ca    9.2      22 Oct 2021 01:08  Phos  2.7     10-22  Mg     1.8     10-    TPro  7.5  /  Alb  3.8  /  TBili  0.7  /  DBili  x   /  AST  15  /  ALT  18  /  AlkPhos  206<H>  10-22    Ca    9.5      21 Oct 2021 00:33  Phos  2.8     10-  Mg     1.7     10-    TPro  7.8  /  Alb  4.0  /  TBili  0.8  /  DBili  x   /  AST  17  /  ALT  20  /  AlkPhos  192<H>  10-                                                                                                                                                                            PTT - ( 2021 04:52 )  PTT:45.2 sec LIVER FUNCTIONS - ( 2021 00:42 )  Alb: 3.4 g/dL / Pro: 6.7 g/dL / ALK PHOS: 213 U/L / ALT: 15 U/L / AST: 24 U/L / GGT: x           RADIOLOGY: - Reviewed and analyzed RT Pig tail cathter  , LVAD HM2, CT scan of abdomen reviewed result noted

## 2021-10-22 NOTE — PROGRESS NOTE ADULT - PROBLEM SELECTOR PLAN 5
- S/p trach 10/4 by ENT, consent given by HCP sister.  - currently tolerating trach collar  - Of note if patient becomes hypoxic or SOB, he is not to be placed on vent - S/p trach 10/4 by ENT, consent given by HCP sister.  - currently tolerating trach collar  - Low O2 sats yesterday when trying speaking valve (?aspiration)  - Of note if patient becomes hypoxic or SOB, he is not to be placed on vent

## 2021-10-22 NOTE — PROGRESS NOTE ADULT - SUBJECTIVE AND OBJECTIVE BOX
RICKY HAMPTON  MRN-[id number]  Patient is a 66y old  Male who presents with a chief complaint of Anemia, Supratherapeutic INR, Dark Stools (22 Oct 2021 07:34)    HPI:  65M PMH ACC/AHA stage D HF due to NICM HM2 LVAD , TV annuloplasty ring 17 as destination therapy due to severe peripheral artery disease with significant stenosis  SIADH, Depression, CKD-3 with hyperkalemia, past E. coli UTIs, driveline drainage (21) and COVID-19 (back in 2020)  He was recently seen in clinic where he complained of abdominal pain and dark stools w constipation back in May. He presents to [facility] ER today weakness and fatigue, moderate and + Black stools for three days, on coumadin secondary to warfarin use in the setting of an LVAD. Patient has required transfusions for GIB in the past. Mostly recently back in 2021 pt had anemia with dark stools. No interventions was done at that time. However Last Endoscopy was done in 2020 (negative). Today labs show patient is anemic with H/H of 4.5/16.3,. INR is 8.84 MAP in the 90s, Temp 35.1. He denies any chest pain, shortness of breath, dizziness, abd pain, nausea or vomiting.       (2021 16:57)      Surgery/Hospital Course:   admit for melena w/ anemia, INR 8.84   6/15 Capsul study (+) for small bowel bleed, balloon endoscopy (old blood in prox ileum); post EGD - septic w/ L opacity, re-intubated for concern for aspiration, TTE (Mod MR, decrease biV w/ interventricular septum boweing towards R)   bronch    +C Diff    CT C/A/P: Fluid filled colon which may be 2/2 rapid transit. Small bilateral pleffs with associates. Compressive atelectasis New ISABELLE & LLL  parenchymal opacities, suspicious for pneumonia. Moderate stenosis in the proximal superior mesenteric artery.    #8 Shiley trach at bedside    LVAD speed increased to 9200   Bronch   TC since . Patient transferred to SDU.    INR today 2.64.  H&H 7.3/24 this AM.  Will repeat CBC at noon, and will send stool guaiac Patient with persistent abdominal tenderness, rate of tube feeds decreased.  No nausea/vomiting.     INR today 2.4. H&H 9.1/28.6 low flow overnight /N&V, refusing Tube feeds on D5 1/2 normal  @50 cc/hr   INR 2.69  H&H 7.7/.1 refusing Tube feeds on D5 1/2 normal  @50 cc/hr. This am + BM Melena Dr Oneill HF  aware- PRBC x1  GI team consulted -  NPO  plan on study in am-  D/w Dr Cdaet Patient  to return to CTU for further management; 1PRBCS    Post op INR 2.2 today.  No bleeding. BC + for SM.  Pt is hypotensive requiring pressor and inotropic support.  ID follow up today on Cefepime will follow.   R PTC for PTX    CT C/A/P: sub q emphysema in R chest wall, GGO RUL, small ascites CTH negative; Abd US: GB thickening, pericholecystic fluid     Perchole drain in place continues to drain total output overnight 133.  Fever today 38.8    duplex LE negative    Patient with persistent abdominal pain, refusing tube feeds and medications, Psych consulted   CTA A/P ordered to r/o mesenteric ischemia 2/2 persistent anorexia, nausea, vomiting. Revealed:  Evaluation of the mesenteric vessels is limited by streak artifact from LVAD. There appears to be severe stenosis of the proximal SMA; abdominal mesenteric doppler is recommended for further evaluation. 2.  No small bowel findings to suggest acute mesenteric ischemia. 3.  Focal dissections involving the right and left common iliac arteries.  8/15: Cultures resulted BC 1/2 +GPC in clusters, SC enterobacter; mesenteric duplex: borderline stenosis of proximal SMA  : CT C/A/P noncon: Nondular opacities in R lung apex w/cavitation, abd nl  :  Continue current care, treatment of thyrotoxicosis with medications as per endocrine, d/c ABX as per team.    RUQ sono: Contracted gallbladder with cholecystostomy tube in place.  9/10 failed SMA stent, c/b RP bleed s/p 3U PRCB   CTA Abd/Pelvis L RP hematoma    Trach decannulated    intubated fro resp distress for increased WOB, LL pneumonia; CT C/A/P: progressive ISABELLE opacities and L consolidations, multifocal pneumonia    TTE (EF 25%, decreased RV, mod MR)   10/3 VT -> Lidocaine gtt   10/4 Trach size 6 DCT cuff  10/5 CT abd (neg for intra-abd abcess, severe stenosis of prox SMA and b/l iliac arteries)  10/18 SMA Stent     Today:  - Patient is still having episodes of vomiting     REVIEW OF SYSTEMS:  Speak over trach   Gen: No fever  EYES/ENT: No visual changes;  No vertigo or throat pain   NECK: No pain   RES:  No shortness of breath or Cough  CARD: No chest pain   GI: +intermittently c/o abd pain and nausea   : No dysuria  NEURO: No weakness  SKIN: No itching, rashes     ICU Vital Signs Last 24 Hrs  T(C): 37.2 (22 Oct 2021 08:00), Max: 37.2 (22 Oct 2021 08:00)  T(F): 99 (22 Oct 2021 08:00), Max: 99 (22 Oct 2021 08:00)  HR: 117 (22 Oct 2021 11:00) (104 - 124)  BP: --  BP(mean): --  ABP: 79/68 (22 Oct 2021 11:00) (65/60 - 348/348)  ABP(mean): 73 (22 Oct 2021 11:00) (62 - 348)  RR: 18 (22 Oct 2021 11:00) (10 - 29)  SpO2: 100% (22 Oct 2021 11:00) (94% - 100%)      Physical Exam:  Gen:  NAD  CNS: moves all extremities to command   Neck: no JVD, +trach, +central line   RES : coarse breath sounds, no wheezing    CVS: +LVAD hum   Abd: Soft. Sybil drain with bilious fluid. Positive BS throughout. Mild RUQ abdominal tenderness by perc sybil.  Skin: No rash, erythema, cyanosis.  Vasc: Warm and well-perfused.  Ext:  no edema    ============================I/O===========================   I&O's Detail    21 Oct 2021 07:01  -  22 Oct 2021 07:00  --------------------------------------------------------  IN:    dextrose 5% + sodium chloride 0.45% w/ Additives: 300 mL    Enteral Tube Flush: 100 mL    IV PiggyBack: 1150 mL    Miscellaneous Tube Feedin mL    sodium chloride 0.9%: 10 mL  Total IN: 2000 mL    OUT:    Drain (mL): 170 mL    Emesis (mL): 1 mL    Voided (mL): 975 mL  Total OUT: 1146 mL    Total NET: 854 mL      22 Oct 2021 07:01  -  22 Oct 2021 11:57  --------------------------------------------------------  IN:    IV PiggyBack: 318 mL  Total IN: 318 mL    OUT:  Total OUT: 0 mL    Total NET: 318 mL        ============================ LABS =========================                        8.8    18.67 )-----------( 230      ( 22 Oct 2021 01:33 )             26.9     10-    128<L>  |  90<L>  |  11  ----------------------------<  101<H>  4.3   |  25  |  0.61    Ca    9.2      22 Oct 2021 01:08  Phos  2.7     10-  Mg     1.8     10-22    TPro  7.5  /  Alb  3.8  /  TBili  0.7  /  DBili  x   /  AST  15  /  ALT  18  /  AlkPhos  206<H>  10-22    LIVER FUNCTIONS - ( 22 Oct 2021 01:08 )  Alb: 3.8 g/dL / Pro: 7.5 g/dL / ALK PHOS: 206 U/L / ALT: 18 U/L / AST: 15 U/L / GGT: x             ABG - ( 22 Oct 2021 00:51 )  pH, Arterial: 7.44  pH, Blood: x     /  pCO2: 43    /  pO2: 138   / HCO3: 29    / Base Excess: 4.6   /  SaO2: 99.2                ======================Micro/Rad/Cardio=================  Culture: Reviewed   CXR: Reviewed  Echo:Reviewed  ======================================================  PAST MEDICAL & SURGICAL HISTORY:  CHF (congestive heart failure)    CAD (coronary artery disease)    Depression    Pleural effusion    History of 2019 novel coronavirus disease (COVID-19)  2020    Hemorrhoids    Bleeding hemorrhoids    Peripheral arterial disease    Claudication    BPH with urinary obstruction    ACC/AHA stage D systolic heart failure    Anticoagulation goal of INR 2.0 to 2.5    Falls    Clavicle fracture    CKD (chronic kidney disease), stage III    Iron deficiency anemia    H/O epistaxis    Vertigo    GI bleed    S/P TVR (tricuspid valve repair)    S/P ventricular assist device    S/P endoscopy      ====================ASSESSMENT ==============  Stage D Nonischemic Cardiomyopathy, Status Post HM2 on 2017    Cardiogenic shock  Hemodynamic instability   Acute hypoxemic respiratory failure s/p trach , decannulated on ; Reintubated on    GI bleed , Status Post Enteroscopy   Anemia, in setting of melena   Chronic Kidney Disease  Stress hyperglycemia   C.diff positive on    Hypovolemic shock  Septic shock  Leukocytosis  GB thickening/percholecystic s/p perc sybil by IR   SMA stenosis s/p SMA Stent on 10/18   Serratia/citrobacter pneumonia   Stenotrophomonas pneumonia   Enterobacter pneumonia   Nasuea/vomiting  Deconditioning     Plan:  ====================== NEUROLOGY=====================  Continue close monitoring of neuro status   C/w sertraline, prochlorperazine, and mirtazapine for depression     fentaNYL    Injectable 12 MICROGram(s) IV Push every 3 hours PRN Moderate to severe pain  mirtazapine 7.5 milliGRAM(s) Oral daily  prochlorperazine   Tablet 10 milliGRAM(s) Oral three times a day  sertraline 100 milliGRAM(s) Oral daily    ==================== RESPIRATORY======================  Acute hypoxemic respiratory failure s/p #8 shiley trach on ; decannulated on ; Reintubated on ; trach size 6 DCT cuff on 10/4   Continue close monitoring of respiratory rate and breathing pattern, following of ABG’s with [place] monitoring, continuous pulse oximetry monitoring.   Tolerating TC since 10/19, continue as tolerated     guaiFENesin Oral Liquid (Sugar-Free) 200 milliGRAM(s) Oral every 4 hours PRN mucous plugging    ====================CARDIOVASCULAR==================  Stage D Nonischemic Cardiomyopathy, Status Post HM2 on 2017; LVAD settings 9200, flow 4.6  TTE 10/4:  Severely decreased LV systolic function, Diffuse hypokinesis. Mild AR. No pericardial effusion   VT on 10/4, s/p Lidocaine drip, continue close tele monitoring   ASA/Plavix for recent SMA stent     predniSONE   Tablet 15 milliGRAM(s) Oral every 24 hours  aspirin  chewable 81 milliGRAM(s) Oral daily  clopidogrel Tablet 75 milliGRAM(s) Oral daily    ===================HEMATOLOGIC/ONC ===================  CTA A/P on  +L RP hematoma   Acute blood loss anemia, monitor H&H/Plts   Continue Plavix as per vascular recs for established peripheral artery disease   LUE US on 10/20 with no pseudoaneurysm      ===================== RENAL =========================  Hx of CKD, continue monitoring urine output, I&OS, BUN/Cr   Replete lytes PRN. Keep K> 4 and Mg >2    ==================== GASTROINTESTINAL===================  Tolerating tube feeds, Vital AF @ 30cc/hr, with goal 45cc/hr   CT A/P 10/5 neg for intra-abd abcess, severe stenosis of prox SMA and b/l iliac arteries  CTA A/P : Evaluation of the mesenteric vessels is limited by streak artifact from LVAD. There appears to be severe stenosis of the proximal SMA; abdominal mesenteric doppler is recommended for further evaluation. 2.  No small bowel findings to suggest acute mesenteric ischemia. 3.  Focal dissections involving the right and left common iliac arteries.  Stenosis of proximal SMA, s/p failed SMA stent c/b RP bleed on 9/10 - SMA stent with Vascular 10/18   Simethicone PRN for gas   Reglan for gut motility  Zofran for nausea     ondansetron Injectable 8 milliGRAM(s) IV Push every 8 hours  dextrose 5% + sodium chloride 0.45% with potassium chloride 10 mEq/L 1000 milliLiter(s) (30 mL/Hr) IV Continuous <Continuous>  multivitamin 1 Tablet(s) Oral daily  pantoprazole  Injectable 40 milliGRAM(s) IV Push every 12 hours  simethicone 80 milliGRAM(s) Chew every 8 hours PRN Gas  sodium chloride 0.9%. 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   CT C/A/P : progressive ISABELLE opacities and L consolidations, multifocal pneumonia  BCx 1/2 on 10/1 +Enterobacter cloacae complex, BCx  10/14+ Serratia marcescens   BCx 10/14 and SCX on 10/14 +Stenotrophomonas maltophilia, c/w Bactrim, also started Vancomycin this AM       trimethoprim / sulfamethoxazole IVPB 300 milliGRAM(s) IV Intermittent every 6 hours  vancomycin  IVPB 1000 milliGRAM(s) IV Intermittent every 12 hours      Patient requires continuous monitoring with bedside rhythm monitoring, pulse ox monitoring, and intermittent blood gas analysis. Care plan discussed with ICU care team. Patient remained critical and at risk for life threatening decompensation.     By signing my name below, I, Aparna Saleh, attest that this documentation has been prepared under the direction and in the presence of Jaclyn Mendoza NP   Electronically signed: Cesia Villgeas, 10-22-21 @ 11:58    I, Jaclyn Mendoza, personally performed the services described in this documentation. all medical record entries made by the scribe were at my direction and in my presence. I have reviewed the chart and agree that the record reflects my personal performance and is accurate and complete  Electronically signed: Jaclyn Mendoza NP        RICKY HAMPTON  MRN-[id number]  Patient is a 66y old  Male who presents with a chief complaint of Anemia, Supratherapeutic INR, Dark Stools (22 Oct 2021 07:34)    HPI:  65M PMH ACC/AHA stage D HF due to NICM HM2 LVAD , TV annuloplasty ring 17 as destination therapy due to severe peripheral artery disease with significant stenosis  SIADH, Depression, CKD-3 with hyperkalemia, past E. coli UTIs, driveline drainage (21) and COVID-19 (back in 2020)  He was recently seen in clinic where he complained of abdominal pain and dark stools w constipation back in May. He presents to [facility] ER today weakness and fatigue, moderate and + Black stools for three days, on coumadin secondary to warfarin use in the setting of an LVAD. Patient has required transfusions for GIB in the past. Mostly recently back in 2021 pt had anemia with dark stools. No interventions was done at that time. However Last Endoscopy was done in 2020 (negative). Today labs show patient is anemic with H/H of 4.5/16.3,. INR is 8.84 MAP in the 90s, Temp 35.1. He denies any chest pain, shortness of breath, dizziness, abd pain, nausea or vomiting.       (2021 16:57)      Surgery/Hospital Course:   admit for melena w/ anemia, INR 8.84   6/15 Capsul study (+) for small bowel bleed, balloon endoscopy (old blood in prox ileum); post EGD - septic w/ L opacity, re-intubated for concern for aspiration, TTE (Mod MR, decrease biV w/ interventricular septum boweing towards R)   bronch    +C Diff    CT C/A/P: Fluid filled colon which may be 2/2 rapid transit. Small bilateral pleffs with associates. Compressive atelectasis New ISABELLE & LLL  parenchymal opacities, suspicious for pneumonia. Moderate stenosis in the proximal superior mesenteric artery.    #8 Shiley trach at bedside    LVAD speed increased to 9200   Bronch   TC since . Patient transferred to SDU.    INR today 2.64.  H&H 7.3/24 this AM.  Will repeat CBC at noon, and will send stool guaiac Patient with persistent abdominal tenderness, rate of tube feeds decreased.  No nausea/vomiting.     INR today 2.4. H&H 9.1/28.6 low flow overnight /N&V, refusing Tube feeds on D5 1/2 normal  @50 cc/hr   INR 2.69  H&H 7.7/.1 refusing Tube feeds on D5 1/2 normal  @50 cc/hr. This am + BM Melena Dr Oneill HF  aware- PRBC x1  GI team consulted -  NPO  plan on study in am-  D/w Dr Cadet Patient  to return to CTU for further management; 1PRBCS    Post op INR 2.2 today.  No bleeding. BC + for SM.  Pt is hypotensive requiring pressor and inotropic support.  ID follow up today on Cefepime will follow.   R PTC for PTX    CT C/A/P: sub q emphysema in R chest wall, GGO RUL, small ascites CTH negative; Abd US: GB thickening, pericholecystic fluid     Perchole drain in place continues to drain total output overnight 133.  Fever today 38.8    duplex LE negative    Patient with persistent abdominal pain, refusing tube feeds and medications, Psych consulted   CTA A/P ordered to r/o mesenteric ischemia 2/2 persistent anorexia, nausea, vomiting. Revealed:  Evaluation of the mesenteric vessels is limited by streak artifact from LVAD. There appears to be severe stenosis of the proximal SMA; abdominal mesenteric doppler is recommended for further evaluation. 2.  No small bowel findings to suggest acute mesenteric ischemia. 3.  Focal dissections involving the right and left common iliac arteries.  8/15: Cultures resulted BC 1/2 +GPC in clusters, SC enterobacter; mesenteric duplex: borderline stenosis of proximal SMA  : CT C/A/P noncon: Nondular opacities in R lung apex w/cavitation, abd nl  :  Continue current care, treatment of thyrotoxicosis with medications as per endocrine, d/c ABX as per team.    RUQ sono: Contracted gallbladder with cholecystostomy tube in place.  9/10 failed SMA stent, c/b RP bleed s/p 3U PRCB   CTA Abd/Pelvis L RP hematoma    Trach decannulated    intubated fro resp distress for increased WOB, LL pneumonia; CT C/A/P: progressive ISABELLE opacities and L consolidations, multifocal pneumonia    TTE (EF 25%, decreased RV, mod MR)   10/3 VT -> Lidocaine gtt   10/4 Trach size 6 DCT cuff  10/5 CT abd (neg for intra-abd abcess, severe stenosis of prox SMA and b/l iliac arteries)  10/18 SMA Stent     Today:  - Patient is still having episodes of vomiting     REVIEW OF SYSTEMS:  Speak over trach   Gen: No fever  EYES/ENT: No visual changes;  No vertigo or throat pain   NECK: No pain   RES:  No shortness of breath or Cough  CARD: No chest pain   GI: +intermittently c/o abd pain and nausea   : No dysuria  NEURO: No weakness  SKIN: No itching, rashes     ICU Vital Signs Last 24 Hrs  T(C): 37.2 (22 Oct 2021 08:00), Max: 37.2 (22 Oct 2021 08:00)  T(F): 99 (22 Oct 2021 08:00), Max: 99 (22 Oct 2021 08:00)  HR: 117 (22 Oct 2021 11:00) (104 - 124)  BP: --  BP(mean): --  ABP: 79/68 (22 Oct 2021 11:00) (65/60 - 348/348)  ABP(mean): 73 (22 Oct 2021 11:00) (62 - 348)  RR: 18 (22 Oct 2021 11:00) (10 - 29)  SpO2: 100% (22 Oct 2021 11:00) (94% - 100%)      Physical Exam:  Gen:  NAD  CNS: moves all extremities to command   Neck: no JVD, +trach, +central line   RES : coarse breath sounds, no wheezing    CVS: +LVAD hum   Abd: Soft. Sybil drain with bilious fluid. Positive BS throughout. Mild RUQ abdominal tenderness by perc sybil.  Skin: No rash, erythema, cyanosis.  Vasc: Warm and well-perfused.  Ext:  no edema    ============================I/O===========================   I&O's Detail    21 Oct 2021 07:01  -  22 Oct 2021 07:00  --------------------------------------------------------  IN:    dextrose 5% + sodium chloride 0.45% w/ Additives: 300 mL    Enteral Tube Flush: 100 mL    IV PiggyBack: 1150 mL    Miscellaneous Tube Feedin mL    sodium chloride 0.9%: 10 mL  Total IN: 2000 mL    OUT:    Drain (mL): 170 mL    Emesis (mL): 1 mL    Voided (mL): 975 mL  Total OUT: 1146 mL    Total NET: 854 mL      22 Oct 2021 07:01  -  22 Oct 2021 11:57  --------------------------------------------------------  IN:    IV PiggyBack: 318 mL  Total IN: 318 mL    OUT:  Total OUT: 0 mL    Total NET: 318 mL        ============================ LABS =========================                        8.8    18.67 )-----------( 230      ( 22 Oct 2021 01:33 )             26.9     10-    128<L>  |  90<L>  |  11  ----------------------------<  101<H>  4.3   |  25  |  0.61    Ca    9.2      22 Oct 2021 01:08  Phos  2.7     10-  Mg     1.8     10-22    TPro  7.5  /  Alb  3.8  /  TBili  0.7  /  DBili  x   /  AST  15  /  ALT  18  /  AlkPhos  206<H>  10-22    LIVER FUNCTIONS - ( 22 Oct 2021 01:08 )  Alb: 3.8 g/dL / Pro: 7.5 g/dL / ALK PHOS: 206 U/L / ALT: 18 U/L / AST: 15 U/L / GGT: x             ABG - ( 22 Oct 2021 00:51 )  pH, Arterial: 7.44  pH, Blood: x     /  pCO2: 43    /  pO2: 138   / HCO3: 29    / Base Excess: 4.6   /  SaO2: 99.2                ======================Micro/Rad/Cardio=================  Culture: Reviewed   CXR: Reviewed  Echo:Reviewed  ======================================================  PAST MEDICAL & SURGICAL HISTORY:  CHF (congestive heart failure)    CAD (coronary artery disease)    Depression    Pleural effusion    History of 2019 novel coronavirus disease (COVID-19)  2020    Hemorrhoids    Bleeding hemorrhoids    Peripheral arterial disease    Claudication    BPH with urinary obstruction    ACC/AHA stage D systolic heart failure    Anticoagulation goal of INR 2.0 to 2.5    Falls    Clavicle fracture    CKD (chronic kidney disease), stage III    Iron deficiency anemia    H/O epistaxis    Vertigo    GI bleed    S/P TVR (tricuspid valve repair)    S/P ventricular assist device    S/P endoscopy      ====================ASSESSMENT ==============  Stage D Nonischemic Cardiomyopathy, Status Post HM2 on 2017    Cardiogenic shock  Hemodynamic instability   Acute hypoxemic respiratory failure s/p trach , decannulated on ; Reintubated on    GI bleed , Status Post Enteroscopy   Anemia, in setting of melena   Chronic Kidney Disease  Stress hyperglycemia   C.diff positive on    Hypovolemic shock  Septic shock  Leukocytosis  GB thickening/percholecystic s/p perc sybil by IR   SMA stenosis s/p SMA Stent on 10/18   Serratia/citrobacter pneumonia   Stenotrophomonas pneumonia   Enterobacter pneumonia   Nasuea/vomiting  Deconditioning     Plan:  ====================== NEUROLOGY=====================  Continue close monitoring of neuro status   C/w sertraline, prochlorperazine, and mirtazapine for depression   C/w fentanyl PRN for pain     fentaNYL    Injectable 12 MICROGram(s) IV Push every 3 hours PRN Moderate to severe pain  mirtazapine 7.5 milliGRAM(s) Oral daily  prochlorperazine   Tablet 10 milliGRAM(s) Oral three times a day  sertraline 100 milliGRAM(s) Oral daily    ==================== RESPIRATORY======================  Acute hypoxemic respiratory failure s/p #8 shiley trach on ; decannulated on ; Reintubated on ; trach size 6 DCT cuff on 10/4   Continue close monitoring of respiratory rate and breathing pattern, following of ABG’s with [place] monitoring, continuous pulse oximetry monitoring.   Tolerating TC since 10/19, continue as tolerated   Guaifenesin PO PRN mucous plugging     guaiFENesin Oral Liquid (Sugar-Free) 200 milliGRAM(s) Oral every 4 hours PRN mucous plugging    ====================CARDIOVASCULAR==================  Stage D Nonischemic Cardiomyopathy, Status Post HM2 on 2017; LVAD settings 9200, flow 4.6  TTE 10/4:  Severely decreased LV systolic function, Diffuse hypokinesis. Mild AR. No pericardial effusion   VT on 10/4, s/p Lidocaine drip, continue close tele monitoring   ASA/Plavix for recent SMA stent     aspirin  chewable 81 milliGRAM(s) Oral daily  clopidogrel Tablet 75 milliGRAM(s) Oral daily    ===================HEMATOLOGIC/ONC ===================  CTA A/P on  +L RP hematoma   Acute blood loss anemia, monitor H&H/Plts   Continue Plavix as per vascular recs for established peripheral artery disease   LUE US on 10/20 with no pseudoaneurysm    ===================== RENAL =========================  Hx of CKD, continue monitoring urine output, I&OS, BUN/Cr   Replete lytes PRN. Keep K> 4 and Mg >2    ==================== GASTROINTESTINAL===================  Tolerating tube feeds, Vital AF @ 30cc/hr, with goal 45cc/hr   CT A/P 10/5 neg for intra-abd abcess, severe stenosis of prox SMA and b/l iliac arteries  CTA A/P : Evaluation of the mesenteric vessels is limited by streak artifact from LVAD. There appears to be severe stenosis of the proximal SMA; abdominal mesenteric doppler is recommended for further evaluation. 2.  No small bowel findings to suggest acute mesenteric ischemia. 3.  Focal dissections involving the right and left common iliac arteries.  Stenosis of proximal SMA, s/p failed SMA stent c/b RP bleed on 9/10 - SMA stent with Vascular 10/18   Simethicone PRN for gas   Reglan for gut motility  Zofran for nausea     ondansetron Injectable 8 milliGRAM(s) IV Push every 8 hours  dextrose 5% + sodium chloride 0.45% with potassium chloride 10 mEq/L 1000 milliLiter(s) (30 mL/Hr) IV Continuous <Continuous>  multivitamin 1 Tablet(s) Oral daily  pantoprazole  Injectable 40 milliGRAM(s) IV Push every 12 hours  simethicone 80 milliGRAM(s) Chew every 8 hours PRN Gas  sodium chloride 0.9%. 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   CT C/A/P : progressive ISABELLE opacities and L consolidations, multifocal pneumonia  BCx 1/2 on 10/1 +Enterobacter cloacae complex, BCx  10/14+ Serratia marcescens   BCx 10/14 and SCX on 10/14 +Stenotrophomonas maltophilia, c/w Bactrim and vancomycin       trimethoprim / sulfamethoxazole IVPB 300 milliGRAM(s) IV Intermittent every 6 hours  vancomycin  IVPB 1000 milliGRAM(s) IV Intermittent every 12 hours      Patient requires continuous monitoring with bedside rhythm monitoring, pulse ox monitoring, and intermittent blood gas analysis. Care plan discussed with ICU care team. Patient remained critical and at risk for life threatening decompensation.     By signing my name below, I, Aparna Saleh, attest that this documentation has been prepared under the direction and in the presence of Jaclyn Mendoza NP   Electronically signed: Cesia Villegas, 10-22-21 @ 11:58    I, Jaclyn Mendoza, personally performed the services described in this documentation. all medical record entries made by the scribe were at my direction and in my presence. I have reviewed the chart and agree that the record reflects my personal performance and is accurate and complete  Electronically signed: Jaclyn Mendoza NP        RICKY HAMPTON  MRN-[id number]  Patient is a 66y old  Male who presents with a chief complaint of Anemia, Supratherapeutic INR, Dark Stools (22 Oct 2021 07:34)    HPI:  65M PMH ACC/AHA stage D HF due to NICM HM2 LVAD , TV annuloplasty ring 17 as destination therapy due to severe peripheral artery disease with significant stenosis  SIADH, Depression, CKD-3 with hyperkalemia, past E. coli UTIs, driveline drainage (21) and COVID-19 (back in 2020)  He was recently seen in clinic where he complained of abdominal pain and dark stools w constipation back in May. He presents to [facility] ER today weakness and fatigue, moderate and + Black stools for three days, on coumadin secondary to warfarin use in the setting of an LVAD. Patient has required transfusions for GIB in the past. Mostly recently back in 2021 pt had anemia with dark stools. No interventions was done at that time. However Last Endoscopy was done in 2020 (negative). Today labs show patient is anemic with H/H of 4.5/16.3,. INR is 8.84 MAP in the 90s, Temp 35.1. He denies any chest pain, shortness of breath, dizziness, abd pain, nausea or vomiting.       (2021 16:57)      Surgery/Hospital Course:   admit for melena w/ anemia, INR 8.84   6/15 Capsul study (+) for small bowel bleed, balloon endoscopy (old blood in prox ileum); post EGD - septic w/ L opacity, re-intubated for concern for aspiration, TTE (Mod MR, decrease biV w/ interventricular septum boweing towards R)   bronch    +C Diff    CT C/A/P: Fluid filled colon which may be 2/2 rapid transit. Small bilateral pleffs with associates. Compressive atelectasis New ISABELLE & LLL  parenchymal opacities, suspicious for pneumonia. Moderate stenosis in the proximal superior mesenteric artery.    #8 Shiley trach at bedside    LVAD speed increased to 9200   Bronch   TC since . Patient transferred to SDU.    INR today 2.64.  H&H 7.3/24 this AM.  Will repeat CBC at noon, and will send stool guaiac Patient with persistent abdominal tenderness, rate of tube feeds decreased.  No nausea/vomiting.     INR today 2.4. H&H 9.1/28.6 low flow overnight /N&V, refusing Tube feeds on D5 1/2 normal  @50 cc/hr   INR 2.69  H&H 7.7/.1 refusing Tube feeds on D5 1/2 normal  @50 cc/hr. This am + BM Melena Dr Oneill HF  aware- PRBC x1  GI team consulted -  NPO  plan on study in am-  D/w Dr Cadet Patient  to return to CTU for further management; 1PRBCS    Post op INR 2.2 today.  No bleeding. BC + for SM.  Pt is hypotensive requiring pressor and inotropic support.  ID follow up today on Cefepime will follow.   R PTC for PTX    CT C/A/P: sub q emphysema in R chest wall, GGO RUL, small ascites CTH negative; Abd US: GB thickening, pericholecystic fluid     Perchole drain in place continues to drain total output overnight 133.  Fever today 38.8    duplex LE negative    Patient with persistent abdominal pain, refusing tube feeds and medications, Psych consulted   CTA A/P ordered to r/o mesenteric ischemia 2/2 persistent anorexia, nausea, vomiting. Revealed:  Evaluation of the mesenteric vessels is limited by streak artifact from LVAD. There appears to be severe stenosis of the proximal SMA; abdominal mesenteric doppler is recommended for further evaluation. 2.  No small bowel findings to suggest acute mesenteric ischemia. 3.  Focal dissections involving the right and left common iliac arteries.  8/15: Cultures resulted BC 1/2 +GPC in clusters, SC enterobacter; mesenteric duplex: borderline stenosis of proximal SMA  : CT C/A/P noncon: Nondular opacities in R lung apex w/cavitation, abd nl  :  Continue current care, treatment of thyrotoxicosis with medications as per endocrine, d/c ABX as per team.    RUQ sono: Contracted gallbladder with cholecystostomy tube in place.  9/10 failed SMA stent, c/b RP bleed s/p 3U PRCB   CTA Abd/Pelvis L RP hematoma    Trach decannulated    intubated fro resp distress for increased WOB, LL pneumonia; CT C/A/P: progressive ISABELLE opacities and L consolidations, multifocal pneumonia    TTE (EF 25%, decreased RV, mod MR)   10/3 VT -> Lidocaine gtt   10/4 Trach size 6 DCT cuff  10/5 CT abd (neg for intra-abd abcess, severe stenosis of prox SMA and b/l iliac arteries)  10/18 SMA Stent     Today:  - Patient is still having episodes of vomiting, plan to resume tube feeds when nausea improves    REVIEW OF SYSTEMS:  Speak over trach   Gen: No fever  EYES/ENT: No visual changes;  No vertigo or throat pain   NECK: No pain   RES:  No shortness of breath or Cough  CARD: No chest pain   GI: +intermittently c/o abd pain and nausea   : No dysuria  NEURO: No weakness  SKIN: No itching, rashes     ICU Vital Signs Last 24 Hrs  T(C): 37.2 (22 Oct 2021 08:00), Max: 37.2 (22 Oct 2021 08:00)  T(F): 99 (22 Oct 2021 08:00), Max: 99 (22 Oct 2021 08:00)  HR: 117 (22 Oct 2021 11:00) (104 - 124)  BP: --  BP(mean): --  ABP: 79/68 (22 Oct 2021 11:00) (65/60 - 348/348)  ABP(mean): 73 (22 Oct 2021 11:00) (62 - 348)  RR: 18 (22 Oct 2021 11:00) (10 - 29)  SpO2: 100% (22 Oct 2021 11:00) (94% - 100%)      Physical Exam:  Gen:  NAD  CNS: moves all extremities to command   Neck: no JVD, +trach, +central line   RES : coarse breath sounds, no wheezing    CVS: +LVAD hum   Abd: Soft. Sybil drain with bilious fluid. Positive BS throughout. Mild RUQ abdominal tenderness by perc sybil.  Skin: No rash, erythema, cyanosis.  Vasc: Warm and well-perfused.  Ext:  no edema    ============================I/O===========================   I&O's Detail    21 Oct 2021 07:01  -  22 Oct 2021 07:00  --------------------------------------------------------  IN:    dextrose 5% + sodium chloride 0.45% w/ Additives: 300 mL    Enteral Tube Flush: 100 mL    IV PiggyBack: 1150 mL    Miscellaneous Tube Feedin mL    sodium chloride 0.9%: 10 mL  Total IN: 2000 mL    OUT:    Drain (mL): 170 mL    Emesis (mL): 1 mL    Voided (mL): 975 mL  Total OUT: 1146 mL    Total NET: 854 mL      22 Oct 2021 07:01  -  22 Oct 2021 11:57  --------------------------------------------------------  IN:    IV PiggyBack: 318 mL  Total IN: 318 mL    OUT:  Total OUT: 0 mL    Total NET: 318 mL        ============================ LABS =========================                        8.8    18.67 )-----------( 230      ( 22 Oct 2021 01:33 )             26.9     10    128<L>  |  90<L>  |  11  ----------------------------<  101<H>  4.3   |  25  |  0.61    Ca    9.2      22 Oct 2021 01:08  Phos  2.7     1022  Mg     1.8     10-22    TPro  7.5  /  Alb  3.8  /  TBili  0.7  /  DBili  x   /  AST  15  /  ALT  18  /  AlkPhos  206<H>  10    LIVER FUNCTIONS - ( 22 Oct 2021 01:08 )  Alb: 3.8 g/dL / Pro: 7.5 g/dL / ALK PHOS: 206 U/L / ALT: 18 U/L / AST: 15 U/L / GGT: x             ABG - ( 22 Oct 2021 00:51 )  pH, Arterial: 7.44  pH, Blood: x     /  pCO2: 43    /  pO2: 138   / HCO3: 29    / Base Excess: 4.6   /  SaO2: 99.2                ======================Micro/Rad/Cardio=================  Culture: Reviewed   CXR: Reviewed  Echo:Reviewed  ======================================================  PAST MEDICAL & SURGICAL HISTORY:  CHF (congestive heart failure)    CAD (coronary artery disease)    Depression    Pleural effusion    History of 2019 novel coronavirus disease (COVID-19)  2020    Hemorrhoids    Bleeding hemorrhoids    Peripheral arterial disease    Claudication    BPH with urinary obstruction    ACC/AHA stage D systolic heart failure    Anticoagulation goal of INR 2.0 to 2.5    Falls    Clavicle fracture    CKD (chronic kidney disease), stage III    Iron deficiency anemia    H/O epistaxis    Vertigo    GI bleed    S/P TVR (tricuspid valve repair)    S/P ventricular assist device    S/P endoscopy      ====================ASSESSMENT ==============  Stage D Nonischemic Cardiomyopathy, Status Post HM2 on 2017    Cardiogenic shock  Hemodynamic instability   Acute hypoxemic respiratory failure s/p trach , decannulated on ; Reintubated on    GI bleed , Status Post Enteroscopy   Anemia, in setting of melena   Chronic Kidney Disease  Stress hyperglycemia   C.diff positive on    Hypovolemic shock  Septic shock  Leukocytosis  GB thickening/percholecystic s/p perc sybil by IR   SMA stenosis s/p SMA Stent on 10/18   Serratia/citrobacter pneumonia   Stenotrophomonas pneumonia   Enterobacter pneumonia   Nasuea/vomiting  Deconditioning     Plan:  ====================== NEUROLOGY=====================  Continue close monitoring of neuro status   C/w sertraline, prochlorperazine, and mirtazapine for depression   C/w fentanyl PRN for pain     fentaNYL    Injectable 12 MICROGram(s) IV Push every 3 hours PRN Moderate to severe pain  mirtazapine 7.5 milliGRAM(s) Oral daily  prochlorperazine   Tablet 10 milliGRAM(s) Oral three times a day  sertraline 100 milliGRAM(s) Oral daily    ==================== RESPIRATORY======================  Acute hypoxemic respiratory failure s/p #8 shiley trach on ; decannulated on ; Reintubated on ; trach size 6 DCT cuff on 10/4   Continue close monitoring of respiratory rate and breathing pattern, following of ABG’s with [place] monitoring, continuous pulse oximetry monitoring.   Tolerating TC since 10/19, continue as tolerated   Guaifenesin PO PRN mucous plugging     guaiFENesin Oral Liquid (Sugar-Free) 200 milliGRAM(s) Oral every 4 hours PRN mucous plugging    ====================CARDIOVASCULAR==================  Stage D Nonischemic Cardiomyopathy, Status Post HM2 on 2017; LVAD settings 9200, flow 4.6  TTE 10/4:  Severely decreased LV systolic function, Diffuse hypokinesis. Mild AR. No pericardial effusion   VT on 10/4, s/p Lidocaine drip, continue close tele monitoring   ASA/Plavix for recent SMA stent     aspirin  chewable 81 milliGRAM(s) Oral daily  clopidogrel Tablet 75 milliGRAM(s) Oral daily    ===================HEMATOLOGIC/ONC ===================  CTA A/P on  +L RP hematoma   Acute blood loss anemia, monitor H&H/Plts   Continue Plavix as per vascular recs for established peripheral artery disease   LUE US on 10/20 with no pseudoaneurysm    ===================== RENAL =========================  Hx of CKD, continue monitoring urine output, I&OS, BUN/Cr   Replete lytes PRN. Keep K> 4 and Mg >2    ==================== GASTROINTESTINAL===================  Tolerating tube feeds, Vital AF @ 30cc/hr, with goal 45cc/hr   CT A/P 10/5 neg for intra-abd abcess, severe stenosis of prox SMA and b/l iliac arteries  CTA A/P : Evaluation of the mesenteric vessels is limited by streak artifact from LVAD. There appears to be severe stenosis of the proximal SMA; abdominal mesenteric doppler is recommended for further evaluation. 2.  No small bowel findings to suggest acute mesenteric ischemia. 3.  Focal dissections involving the right and left common iliac arteries.  Stenosis of proximal SMA, s/p failed SMA stent c/b RP bleed on 9/10 - SMA stent with Vascular 10/18   Simethicone PRN for gas   Reglan for gut motility  Zofran for nausea     ondansetron Injectable 8 milliGRAM(s) IV Push every 8 hours  dextrose 5% + sodium chloride 0.45% with potassium chloride 10 mEq/L 1000 milliLiter(s) (30 mL/Hr) IV Continuous <Continuous>  multivitamin 1 Tablet(s) Oral daily  pantoprazole  Injectable 40 milliGRAM(s) IV Push every 12 hours  simethicone 80 milliGRAM(s) Chew every 8 hours PRN Gas  sodium chloride 0.9%. 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   CT C/A/P : progressive ISABELLE opacities and L consolidations, multifocal pneumonia  BCx 1/2 on 10/1 +Enterobacter cloacae complex, BCx  10/14+ Serratia marcescens   BCx 10/14 and SCX on 10/14 +Stenotrophomonas maltophilia, c/w Bactrim and vancomycin       trimethoprim / sulfamethoxazole IVPB 300 milliGRAM(s) IV Intermittent every 6 hours  vancomycin  IVPB 1000 milliGRAM(s) IV Intermittent every 12 hours      Patient requires continuous monitoring with bedside rhythm monitoring, pulse ox monitoring, and intermittent blood gas analysis. Care plan discussed with ICU care team. Patient remained critical and at risk for life threatening decompensation.     By signing my name below, I, Aparna Saleh, attest that this documentation has been prepared under the direction and in the presence of Jaclyn Mendoza NP   Electronically signed: Cesia Villegas, 10-22-21 @ 11:58    I, Jaclyn Mendoza, personally performed the services described in this documentation. all medical record entries made by the scribe were at my direction and in my presence. I have reviewed the chart and agree that the record reflects my personal performance and is accurate and complete  Electronically signed: Jaclyn Mendoza NP

## 2021-10-22 NOTE — PROGRESS NOTE ADULT - PROBLEM SELECTOR PLAN 8
- Has been transfused multiple times throughout this admission, last transfusion was on 10/5  - since starting ASA and Plavix, Hgb continues to downtrend. No signs of bleeding at this time - Has been transfused multiple times throughout this admission, last transfusion was on 10/5  - since starting ASA and Plavix, Hgb continues to downtrend. No signs of bleeding at this time. Please transfuse if Hgb is less than 8

## 2021-10-22 NOTE — PROGRESS NOTE ADULT - ASSESSMENT
67 YO M with a history of stage D NICM s/p HM2 on 9/2017 as DT (due to severe PAD) with TV ring, prior COVID-19 infection 4/2020, recurrent syncope post LVAD s/p ILR, and chronic abdominal pain with prior negative workup who was admitted 6/14/21 with symptomatic anemia with Hgb 4.5 in setting of INR 8.8 without hemodynamic instability and has since had a protracted hospitalization. He was transfused and underwent VCE which showed active bleeding in the mid small bowel but subsequent enteroscopy 6/15 did not reveal any active bleeding. He acutely decompensated after procedure with fever/hypertension, low flow alarms, and pulmonary infiltrate with hypoxia requiring intubation from probable aspiration PNA. He was unable to extubated and has since undergone tracheostomy but tolerating persistent trach collar and nearing ability for decannulation. 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 He underwent angiogram on 9/10, stent was unable to be placed and course was then complicated by RP bleed. He continued to have persistent leukocytosis and febrile episodes and was noted to have positive sputum culture for serratia marcescens and completed his course of abx. He was initially decannulated on 9/23. Recently he started having multiples of melena, emesis and went into acte respiratory distress and was ultimately re-intubated on 9/26.     He had blood cultures are positive for enterobacter cloacae/serratia, remains on IV antibiotics. He is s/p trach with ENT on 10/4. Sputum cultures positive for stenotrophomonas and blood cultures positive for serratia on bactrim. S/p SMA stent x 2 10/18. Currently tolerating tube feeds (goal rate 65 cc/hr). His overall prognosis remains poor, is now DNR.  65 YO M with a history of stage D NICM s/p HM2 on 9/2017 as DT (due to severe PAD) with TV ring, prior COVID-19 infection 4/2020, recurrent syncope post LVAD s/p ILR, and chronic abdominal pain with prior negative workup who was admitted 6/14/21 with symptomatic anemia with Hgb 4.5 in setting of INR 8.8 without hemodynamic instability and has since had a protracted hospitalization. He was transfused and underwent VCE which showed active bleeding in the mid small bowel but subsequent enteroscopy 6/15 did not reveal any active bleeding. He acutely decompensated after procedure with fever/hypertension, low flow alarms, and pulmonary infiltrate with hypoxia requiring intubation from probable aspiration PNA. He was unable to extubated and has since undergone tracheostomy but tolerating persistent trach collar and nearing ability for decannulation. 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 He underwent angiogram on 9/10, stent was unable to be placed and course was then complicated by RP bleed. He continued to have persistent leukocytosis and febrile episodes and was noted to have positive sputum culture for serratia marcescens and completed his course of abx. He was initially decannulated on 9/23. Recently he started having multiples of melena, emesis and went into acte respiratory distress and was ultimately re-intubated on 9/26.     He had blood cultures are positive for enterobacter cloacae/serratia, remains on IV antibiotics. He is s/p trach with ENT on 10/4. Sputum cultures positive for stenotrophomonas and blood cultures positive for serratia on bactrim. S/p SMA stent x 2 on 10/18. Since undergoing the procedure he continues to have episodes of nausea/vomiting for which tube feeds continue to be placed on hold. His leukocytosis continues to rise however remains afebrile at this time. His overall prognosis remains poor, is now DNR.

## 2021-10-22 NOTE — PROGRESS NOTE ADULT - PROBLEM SELECTOR PLAN 6
- noted to have worsening leukocytosis with labs today. new cultures were taken  - currently on abx, duration and choice as per ID  - Prior cholecystitis w/ per dawn drain with bilious output. - noted to have worsening leukocytosis with labs today. new cultures will be taken, please follow up with results  - currently on abx, duration and choice as per ID  - Prior cholecystitis w/ per dawn drain with bilious output.

## 2021-10-23 NOTE — PROGRESS NOTE ADULT - SUBJECTIVE AND OBJECTIVE BOX
RICKY HAMPTON  MRN-[id number]  Patient is a 66y old  Male who presents with a chief complaint of Anemia, Supratherapeutic INR, Dark Stools (22 Oct 2021 19:41)    HPI:  65M PMH ACC/AHA stage D HF due to NICM HM2 LVAD , TV annuloplasty ring 17 as destination therapy due to severe peripheral artery disease with significant stenosis  SIADH, Depression, CKD-3 with hyperkalemia, past E. coli UTIs, driveline drainage (21) and COVID-19 (back in 2020)  He was recently seen in clinic where he complained of abdominal pain and dark stools w constipation back in May. He presents to [facility] ER today weakness and fatigue, moderate and + Black stools for three days, on coumadin secondary to warfarin use in the setting of an LVAD. Patient has required transfusions for GIB in the past. Mostly recently back in 2021 pt had anemia with dark stools. No interventions was done at that time. However Last Endoscopy was done in 2020 (negative). Today labs show patient is anemic with H/H of 4.5/16.3,. INR is 8.84 MAP in the 90s, Temp 35.1. He denies any chest pain, shortness of breath, dizziness, abd pain, nausea or vomiting.       (2021 16:57)      Surgery/Hospital Course:   admit for melena w/ anemia, INR 8.84   6/15 Capsul study (+) for small bowel bleed, balloon endoscopy (old blood in prox ileum); post EGD - septic w/ L opacity, re-intubated for concern for aspiration, TTE (Mod MR, decrease biV w/ interventricular septum boweing towards R)   bronch    +C Diff    CT C/A/P: Fluid filled colon which may be 2/2 rapid transit. Small bilateral pleffs with associates. Compressive atelectasis New ISABELLE & LLL  parenchymal opacities, suspicious for pneumonia. Moderate stenosis in the proximal superior mesenteric artery.    #8 Shiley trach at bedside    LVAD speed increased to 9200   Bronch   TC since . Patient transferred to SDU.    INR today 2.64.  H&H 7.3/24 this AM.  Will repeat CBC at noon, and will send stool guaiac Patient with persistent abdominal tenderness, rate of tube feeds decreased.  No nausea/vomiting.     INR today 2.4. H&H 9.1/28.6 low flow overnight /N&V, refusing Tube feeds on D5 1/2 normal  @50 cc/hr   INR 2.69  H&H 7.7/.1 refusing Tube feeds on D5 1/2 normal  @50 cc/hr. This am + BM Melena Dr Oneill HF  aware- PRBC x1  GI team consulted -  NPO  plan on study in am-  D/w Dr Cadet Patient  to return to CTU for further management; 1PRBCS    Post op INR 2.2 today.  No bleeding. BC + for SM.  Pt is hypotensive requiring pressor and inotropic support.  ID follow up today on Cefepime will follow.   R PTC for PTX    CT C/A/P: sub q emphysema in R chest wall, GGO RUL, small ascites CTH negative; Abd US: GB thickening, pericholecystic fluid     Perchole drain in place continues to drain total output overnight 133.  Fever today 38.8    duplex LE negative    Patient with persistent abdominal pain, refusing tube feeds and medications, Psych consulted   CTA A/P ordered to r/o mesenteric ischemia 2/2 persistent anorexia, nausea, vomiting. Revealed:  Evaluation of the mesenteric vessels is limited by streak artifact from LVAD. There appears to be severe stenosis of the proximal SMA; abdominal mesenteric doppler is recommended for further evaluation. 2.  No small bowel findings to suggest acute mesenteric ischemia. 3.  Focal dissections involving the right and left common iliac arteries.  8/15: Cultures resulted BC 1/2 +GPC in clusters, SC enterobacter; mesenteric duplex: borderline stenosis of proximal SMA  : CT C/A/P noncon: Nondular opacities in R lung apex w/cavitation, abd nl  :  Continue current care, treatment of thyrotoxicosis with medications as per endocrine, d/c ABX as per team.    RUQ sono: Contracted gallbladder with cholecystostomy tube in place.  9/10 failed SMA stent, c/b RP bleed s/p 3U PRCB   CTA Abd/Pelvis L RP hematoma    Trach decannulated    intubated fro resp distress for increased WOB, LL pneumonia; CT C/A/P: progressive ISABELLE opacities and L consolidations, multifocal pneumonia    TTE (EF 25%, decreased RV, mod MR)   10/3 VT -> Lidocaine gtt   10/4 Trach size 6 DCT cuff  10/5 CT abd (neg for intra-abd abcess, severe stenosis of prox SMA and b/l iliac arteries)  10/18 SMA Stent     Today:  - Continue with aggressive trach collar   - Continue to ambulate     REVIEW OF SYSTEMS:  Speak over trach   Gen: No fever  EYES/ENT: No visual changes;  No vertigo or throat pain   NECK: No pain   RES:  No shortness of breath or Cough  CARD: No chest pain   GI: +intermittently c/o abd pain and nausea   : No dysuria  NEURO: No weakness  SKIN: No itching, rashes     ICU Vital Signs Last 24 Hrs  T(C): 36 (23 Oct 2021 08:00), Max: 37.6 (22 Oct 2021 16:00)  T(F): 96.8 (23 Oct 2021 08:00), Max: 99.7 (22 Oct 2021 16:00)  HR: 122 (23 Oct 2021 09:00) (103 - 133)  BP: --  BP(mean): --  ABP: 122/81 (23 Oct 2021 08:00) (71/61 - 122/81)  ABP(mean): 97 (23 Oct 2021 08:00) (66 - 109)  RR: 20 (23 Oct 2021 09:16) (15 - 36)  SpO2: 98% (23 Oct 2021 09:16) (89% - 100%)      Physical Exam:  Gen:  NAD  CNS: moves all extremities to command   Neck: no JVD, +trach, +central line   RES : coarse breath sounds, no wheezing    CVS: +LVAD hum   Abd: Soft. Sybil drain with bilious fluid. Positive BS throughout. Mild RUQ abdominal tenderness by perc sybil.  Skin: No rash, erythema, cyanosis.  Vasc: Warm and well-perfused.  Ext:  no edema    ============================I/O===========================   I&O's Detail    22 Oct 2021 07:01  -  23 Oct 2021 07:00  --------------------------------------------------------  IN:    Enteral Tube Flush: 60 mL    IV PiggyBack: 1504 mL    multiple electrolytes Injection Type 1.: 60 mL    sodium chloride 0.9%: 210 mL  Total IN: 1834 mL    OUT:    Drain (mL): 80 mL    Emesis (mL): 303 mL    Voided (mL): 750 mL  Total OUT: 1133 mL    Total NET: 701 mL      23 Oct 2021 07:01  -  23 Oct 2021 10:36  --------------------------------------------------------  IN:    sodium chloride 0.9%: 60 mL  Total IN: 60 mL    OUT:    Drain (mL): 1 mL    Miscellaneous Tube Feedin mL  Total OUT: 1 mL    Total NET: 59 mL        ============================ LABS =========================                        7.6    . )-----------( 239      ( 23 Oct 2021 01:08 )             22.4     10-23    127<L>  |  90<L>  |  21  ----------------------------<  111<H>  4.6   |  23  |  0.60    Ca    9.2      23 Oct 2021 01:08  Phos  3.4     10-23  Mg     1.9     10    TPro  7.2  /  Alb  3.6  /  TBili  0.8  /  DBili  x   /  AST  10  /  ALT  13  /  AlkPhos  219<H>  1023    LIVER FUNCTIONS - ( 23 Oct 2021 01:08 )  Alb: 3.6 g/dL / Pro: 7.2 g/dL / ALK PHOS: 219 U/L / ALT: 13 U/L / AST: 10 U/L / GGT: x             ABG - ( 23 Oct 2021 01:06 )  pH, Arterial: 7.43  pH, Blood: x     /  pCO2: 41    /  pO2: 132   / HCO3: 27    / Base Excess: 2.6   /  SaO2: 100.0               ======================Micro/Rad/Cardio=================  Culture: Reviewed   CXR: Reviewed  Echo:Reviewed  ======================================================  PAST MEDICAL & SURGICAL HISTORY:  CHF (congestive heart failure)    CAD (coronary artery disease)    Depression    Pleural effusion    History of 2019 novel coronavirus disease (COVID-19)  2020    Hemorrhoids    Bleeding hemorrhoids    Peripheral arterial disease    Claudication    BPH with urinary obstruction    ACC/AHA stage D systolic heart failure    Anticoagulation goal of INR 2.0 to 2.5    Falls    Clavicle fracture    CKD (chronic kidney disease), stage III    Iron deficiency anemia    H/O epistaxis    Vertigo    GI bleed    S/P TVR (tricuspid valve repair)    S/P ventricular assist device    S/P endoscopy      ====================ASSESSMENT ==============  Stage D Nonischemic Cardiomyopathy, Status Post HM2 on 2017    Cardiogenic shock  Hemodynamic instability   Acute hypoxemic respiratory failure s/p trach , decannulated on ; Reintubated on    GI bleed , Status Post Enteroscopy   Anemia, in setting of melena   Chronic Kidney Disease  Stress hyperglycemia   C.diff positive on    Hypovolemic shock  Septic shock  Leukocytosis  GB thickening/percholecystic s/p perc sybil by IR   SMA stenosis s/p SMA Stent on 10/18   Serratia/citrobacter pneumonia   Stenotrophomonas pneumonia   Enterobacter pneumonia   Nasuea/vomiting  Deconditioning    Plan:  ====================== NEUROLOGY=====================  Continue close monitoring of neuro status   C/w sertraline, prochlorperazine, and mirtazapine for depression   Fentanyl and Tylenol for pain      acetaminophen   IVPB .. 1000 milliGRAM(s) IV Intermittent once  fentaNYL    Injectable 12 MICROGram(s) IV Push every 3 hours PRN Moderate to severe pain  mirtazapine 7.5 milliGRAM(s) Oral daily  prochlorperazine   Tablet 10 milliGRAM(s) Oral three times a day  sertraline 100 milliGRAM(s) Oral daily    ==================== RESPIRATORY======================  Acute hypoxemic respiratory failure s/p #8 shiley trach on ; decannulated on ; Reintubated on ; trach size 6 DCT cuff on 10/4   Continue close monitoring of respiratory rate and breathing pattern, following of ABG’s with Janet monitoring, continuous pulse oximetry monitoring.   Tolerating TC since 10/19, continue as tolerated   Guaifenesin PO PRN mucous plugging     guaiFENesin Oral Liquid (Sugar-Free) 200 milliGRAM(s) Oral every 4 hours PRN mucous plugging    ====================CARDIOVASCULAR==================  Stage D Nonischemic Cardiomyopathy, Status Post HM2 on 2017; LVAD settings 9200, flow 4.6  TTE 10/4:  Severely decreased LV systolic function, Diffuse hypokinesis. Mild AR. No pericardial effusion   VT on 10/4, s/p Lidocaine drip, continue close tele monitoring   ASA/Plavix for recent SMA stent     aspirin  chewable 81 milliGRAM(s) Oral daily  clopidogrel Tablet 75 milliGRAM(s) Oral daily    ===================HEMATOLOGIC/ONC ===================  CTA A/P on  +L RP hematoma   Acute blood loss anemia, monitor H&H/Plts   Continue Plavix as per vascular recs for established peripheral artery disease   LUE US on 10/20 with no pseudoaneurysm    ===================== RENAL =========================  Hx of CKD, continue monitoring urine output, I&OS, BUN/Cr   Replete lytes PRN. Keep K> 4 and Mg >2    ==================== GASTROINTESTINAL===================  Tolerating tube feeds, Vital AF @ 30cc/hr, with goal 45cc/hr   CT A/P 10/5 neg for intra-abd abcess, severe stenosis of prox SMA and b/l iliac arteries  CTA A/P : Evaluation of the mesenteric vessels is limited by streak artifact from LVAD. There appears to be severe stenosis of the proximal SMA; abdominal mesenteric doppler is recommended for further evaluation. 2.  No small bowel findings to suggest acute mesenteric ischemia. 3.  Focal dissections involving the right and left common iliac arteries.  Stenosis of proximal SMA, s/p failed SMA stent c/b RP bleed on 9/10 - SMA stent with Vascular 10/18   Simethicone PRN for gas   Reglan for gut motility  Zofran and fosaprepitant for nausea     multivitamin 1 Tablet(s) Oral daily  pantoprazole  Injectable 40 milliGRAM(s) IV Push every 12 hours  saline laxative (FLEET) Rectal Enema 1 Enema Rectal once  simethicone 80 milliGRAM(s) Chew every 8 hours PRN Gas  sodium chloride 0.9%. 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   CT C/A/P : progressive ISABELLE opacities and L consolidations, multifocal pneumonia  BCx 1/2 on 10/1 +Enterobacter cloacae complex, BCx  10/14+ Serratia marcescens   BCx 10/14 and SCX on 10/14 +Stenotrophomonas maltophilia, c/w Bactrim and vancomycin     trimethoprim / sulfamethoxazole IVPB 300 milliGRAM(s) IV Intermittent every 6 hours  vancomycin  IVPB 1000 milliGRAM(s) IV Intermittent every 12 hours      Patient requires continuous monitoring with bedside rhythm monitoring, pulse ox monitoring, and intermittent blood gas analysis. Care plan discussed with ICU care team. Patient remained critical and at risk for life threatening decompensation.     By signing my name below, I, Aparna Saleh, attest that this documentation has been prepared under the direction and in the presence of Jaclyn Mendoza NP   Electronically signed: Cesia Villegas, 10-23-21 @ 10:36    I, Jaclyn Mendoza, personally performed the services described in this documentation. all medical record entries made by the scribe were at my direction and in my presence. I have reviewed the chart and agree that the record reflects my personal performance and is accurate and complete  Electronically signed: Jaclyn Mendoza NP        RICKY HAMPTON  MRN-[id number]  Patient is a 66y old  Male who presents with a chief complaint of Anemia, Supratherapeutic INR, Dark Stools (22 Oct 2021 19:41)    HPI:  65M PMH ACC/AHA stage D HF due to NICM HM2 LVAD , TV annuloplasty ring 17 as destination therapy due to severe peripheral artery disease with significant stenosis  SIADH, Depression, CKD-3 with hyperkalemia, past E. coli UTIs, driveline drainage (21) and COVID-19 (back in 2020)  He was recently seen in clinic where he complained of abdominal pain and dark stools w constipation back in May. He presents to [facility] ER today weakness and fatigue, moderate and + Black stools for three days, on coumadin secondary to warfarin use in the setting of an LVAD. Patient has required transfusions for GIB in the past. Mostly recently back in 2021 pt had anemia with dark stools. No interventions was done at that time. However Last Endoscopy was done in 2020 (negative). Today labs show patient is anemic with H/H of 4.5/16.3,. INR is 8.84 MAP in the 90s, Temp 35.1. He denies any chest pain, shortness of breath, dizziness, abd pain, nausea or vomiting.       (2021 16:57)      Surgery/Hospital Course:   admit for melena w/ anemia, INR 8.84   6/15 Capsul study (+) for small bowel bleed, balloon endoscopy (old blood in prox ileum); post EGD - septic w/ L opacity, re-intubated for concern for aspiration, TTE (Mod MR, decrease biV w/ interventricular septum boweing towards R)   bronch    +C Diff    CT C/A/P: Fluid filled colon which may be 2/2 rapid transit. Small bilateral pleffs with associates. Compressive atelectasis New ISABELLE & LLL  parenchymal opacities, suspicious for pneumonia. Moderate stenosis in the proximal superior mesenteric artery.    #8 Shiley trach at bedside    LVAD speed increased to 9200   Bronch   TC since . Patient transferred to SDU.    INR today 2.64.  H&H 7.3/24 this AM.  Will repeat CBC at noon, and will send stool guaiac Patient with persistent abdominal tenderness, rate of tube feeds decreased.  No nausea/vomiting.     INR today 2.4. H&H 9.1/28.6 low flow overnight /N&V, refusing Tube feeds on D5 1/2 normal  @50 cc/hr   INR 2.69  H&H 7.7/.1 refusing Tube feeds on D5 1/2 normal  @50 cc/hr. This am + BM Melena Dr Oneill HF  aware- PRBC x1  GI team consulted -  NPO  plan on study in am-  D/w Dr Cadet Patient  to return to CTU for further management; 1PRBCS    Post op INR 2.2 today.  No bleeding. BC + for SM.  Pt is hypotensive requiring pressor and inotropic support.  ID follow up today on Cefepime will follow.   R PTC for PTX    CT C/A/P: sub q emphysema in R chest wall, GGO RUL, small ascites CTH negative; Abd US: GB thickening, pericholecystic fluid     Perchole drain in place continues to drain total output overnight 133.  Fever today 38.8    duplex LE negative    Patient with persistent abdominal pain, refusing tube feeds and medications, Psych consulted   CTA A/P ordered to r/o mesenteric ischemia 2/2 persistent anorexia, nausea, vomiting. Revealed:  Evaluation of the mesenteric vessels is limited by streak artifact from LVAD. There appears to be severe stenosis of the proximal SMA; abdominal mesenteric doppler is recommended for further evaluation. 2.  No small bowel findings to suggest acute mesenteric ischemia. 3.  Focal dissections involving the right and left common iliac arteries.  8/15: Cultures resulted BC 1/2 +GPC in clusters, SC enterobacter; mesenteric duplex: borderline stenosis of proximal SMA  : CT C/A/P noncon: Nondular opacities in R lung apex w/cavitation, abd nl  :  Continue current care, treatment of thyrotoxicosis with medications as per endocrine, d/c ABX as per team.    RUQ sono: Contracted gallbladder with cholecystostomy tube in place.  9/10 failed SMA stent, c/b RP bleed s/p 3U PRCB   CTA Abd/Pelvis L RP hematoma    Trach decannulated    intubated fro resp distress for increased WOB, LL pneumonia; CT C/A/P: progressive ISABELLE opacities and L consolidations, multifocal pneumonia    TTE (EF 25%, decreased RV, mod MR)   10/3 VT -> Lidocaine gtt   10/4 Trach size 6 DCT cuff  10/5 CT abd (neg for intra-abd abcess, severe stenosis of prox SMA and b/l iliac arteries)  10/18 SMA Stent     Today:  - Continue with aggressive trach collar   - Continue to ambulate     REVIEW OF SYSTEMS:  Speak over trach   Gen: No fever  EYES/ENT: No visual changes;  No vertigo or throat pain   NECK: No pain   RES:  No shortness of breath or Cough  CARD: No chest pain   GI: +intermittently c/o abd pain and nausea   : No dysuria  NEURO: No weakness  SKIN: No itching, rashes     ICU Vital Signs Last 24 Hrs  T(C): 36 (23 Oct 2021 08:00), Max: 37.6 (22 Oct 2021 16:00)  T(F): 96.8 (23 Oct 2021 08:00), Max: 99.7 (22 Oct 2021 16:00)  HR: 122 (23 Oct 2021 09:00) (103 - 133)  BP: --  BP(mean): --  ABP: 122/81 (23 Oct 2021 08:00) (71/61 - 122/81)  ABP(mean): 97 (23 Oct 2021 08:00) (66 - 109)  RR: 20 (23 Oct 2021 09:16) (15 - 36)  SpO2: 98% (23 Oct 2021 09:16) (89% - 100%)      Physical Exam:  Gen:  NAD  CNS: moves all extremities to command   Neck: no JVD, +trach, +central line   RES : coarse breath sounds, no wheezing    CVS: +LVAD hum   Abd: Soft. Sybil drain with bilious fluid. Positive BS throughout. Mild RUQ abdominal tenderness by perc sybil.  Skin: No rash, erythema, cyanosis.  Vasc: Warm and well-perfused.  Ext:  no edema    ============================I/O===========================   I&O's Detail    22 Oct 2021 07:01  -  23 Oct 2021 07:00  --------------------------------------------------------  IN:    Enteral Tube Flush: 60 mL    IV PiggyBack: 1504 mL    multiple electrolytes Injection Type 1.: 60 mL    sodium chloride 0.9%: 210 mL  Total IN: 1834 mL    OUT:    Drain (mL): 80 mL    Emesis (mL): 303 mL    Voided (mL): 750 mL  Total OUT: 1133 mL    Total NET: 701 mL      23 Oct 2021 07:01  -  23 Oct 2021 10:36  --------------------------------------------------------  IN:    sodium chloride 0.9%: 60 mL  Total IN: 60 mL    OUT:    Drain (mL): 1 mL    Miscellaneous Tube Feedin mL  Total OUT: 1 mL    Total NET: 59 mL        ============================ LABS =========================                        7.6    . )-----------( 239      ( 23 Oct 2021 01:08 )             22.4     10-23    127<L>  |  90<L>  |  21  ----------------------------<  111<H>  4.6   |  23  |  0.60    Ca    9.2      23 Oct 2021 01:08  Phos  3.4     10-23  Mg     1.9     10    TPro  7.2  /  Alb  3.6  /  TBili  0.8  /  DBili  x   /  AST  10  /  ALT  13  /  AlkPhos  219<H>  1023    LIVER FUNCTIONS - ( 23 Oct 2021 01:08 )  Alb: 3.6 g/dL / Pro: 7.2 g/dL / ALK PHOS: 219 U/L / ALT: 13 U/L / AST: 10 U/L / GGT: x             ABG - ( 23 Oct 2021 01:06 )  pH, Arterial: 7.43  pH, Blood: x     /  pCO2: 41    /  pO2: 132   / HCO3: 27    / Base Excess: 2.6   /  SaO2: 100.0               ======================Micro/Rad/Cardio=================  Culture: Reviewed   CXR: Reviewed  Echo:Reviewed  ======================================================  PAST MEDICAL & SURGICAL HISTORY:  CHF (congestive heart failure)    CAD (coronary artery disease)    Depression    Pleural effusion    History of 2019 novel coronavirus disease (COVID-19)  2020    Hemorrhoids    Bleeding hemorrhoids    Peripheral arterial disease    Claudication    BPH with urinary obstruction    ACC/AHA stage D systolic heart failure    Anticoagulation goal of INR 2.0 to 2.5    Falls    Clavicle fracture    CKD (chronic kidney disease), stage III    Iron deficiency anemia    H/O epistaxis    Vertigo    GI bleed    S/P TVR (tricuspid valve repair)    S/P ventricular assist device    S/P endoscopy      ====================ASSESSMENT ==============  Stage D Nonischemic Cardiomyopathy, Status Post HM2 on 2017    Cardiogenic shock  Hemodynamic instability   Acute hypoxemic respiratory failure s/p trach , decannulated on ; Reintubated on    GI bleed , Status Post Enteroscopy   Anemia, in setting of melena   Chronic Kidney Disease  Stress hyperglycemia   C.diff positive on    Hypovolemic shock  Septic shock  Leukocytosis  GB thickening/percholecystic s/p perc sybil by IR   SMA stenosis s/p SMA Stent on 10/18   Serratia/citrobacter pneumonia   Stenotrophomonas pneumonia   Enterobacter pneumonia   Nasuea/vomiting  Deconditioning    Plan:  ====================== NEUROLOGY=====================  Continue close monitoring of neuro status   C/w sertraline, prochlorperazine, and mirtazapine for depression   Fentanyl and Tylenol for pain      acetaminophen   IVPB .. 1000 milliGRAM(s) IV Intermittent once  fentaNYL    Injectable 12 MICROGram(s) IV Push every 3 hours PRN Moderate to severe pain  mirtazapine 7.5 milliGRAM(s) Oral daily  prochlorperazine   Tablet 10 milliGRAM(s) Oral three times a day  sertraline 100 milliGRAM(s) Oral daily    ==================== RESPIRATORY======================  Acute hypoxemic respiratory failure s/p #8 shiley trach on ; decannulated on ; Reintubated on ; trach size 6 DCT cuff on 10/4   Continue close monitoring of respiratory rate and breathing pattern, following of ABG’s with Janet monitoring, continuous pulse oximetry monitoring.   Tolerating TC since 10/19, continue as tolerated   Guaifenesin PO PRN mucous plugging     guaiFENesin Oral Liquid (Sugar-Free) 200 milliGRAM(s) Oral every 4 hours PRN mucous plugging    ====================CARDIOVASCULAR==================  Stage D Nonischemic Cardiomyopathy, Status Post HM2 on 2017; LVAD settings 9200, flow 5.0  TTE 10/4:  Severely decreased LV systolic function, Diffuse hypokinesis. Mild AR. No pericardial effusion   VT on 10/4, s/p Lidocaine drip, continue close tele monitoring   ASA/Plavix for recent SMA stent     aspirin  chewable 81 milliGRAM(s) Oral daily  clopidogrel Tablet 75 milliGRAM(s) Oral daily    ===================HEMATOLOGIC/ONC ===================  CTA A/P on  +L RP hematoma   Acute blood loss anemia, monitor H&H/Plts   Continue Plavix as per vascular recs for established peripheral artery disease   LUE US on 10/20 with no pseudoaneurysm    ===================== RENAL =========================  Hx of CKD, continue monitoring urine output, I&OS, BUN/Cr   Replete lytes PRN. Keep K> 4 and Mg >2    ==================== GASTROINTESTINAL===================  Tolerating tube feeds, Vital AF @ 30cc/hr, with goal 45cc/hr   CT A/P 10/5 neg for intra-abd abcess, severe stenosis of prox SMA and b/l iliac arteries  CTA A/P : Evaluation of the mesenteric vessels is limited by streak artifact from LVAD. There appears to be severe stenosis of the proximal SMA; abdominal mesenteric doppler is recommended for further evaluation. 2.  No small bowel findings to suggest acute mesenteric ischemia. 3.  Focal dissections involving the right and left common iliac arteries.  Stenosis of proximal SMA, s/p failed SMA stent c/b RP bleed on 9/10 - SMA stent with Vascular 10/18   Simethicone PRN for gas   Reglan for gut motility  Zofran and fosaprepitant for nausea     multivitamin 1 Tablet(s) Oral daily  pantoprazole  Injectable 40 milliGRAM(s) IV Push every 12 hours  saline laxative (FLEET) Rectal Enema 1 Enema Rectal once  simethicone 80 milliGRAM(s) Chew every 8 hours PRN Gas  sodium chloride 0.9%. 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   CT C/A/P : progressive ISABELLE opacities and L consolidations, multifocal pneumonia  BCx 1/2 on 10/1 +Enterobacter cloacae complex, BCx  10/14+ Serratia marcescens   BCx 10/14 and SCX on 10/14 +Stenotrophomonas maltophilia, c/w Bactrim and vancomycin     trimethoprim / sulfamethoxazole IVPB 300 milliGRAM(s) IV Intermittent every 6 hours  vancomycin  IVPB 1000 milliGRAM(s) IV Intermittent every 12 hours      Patient requires continuous monitoring with bedside rhythm monitoring, pulse ox monitoring, and intermittent blood gas analysis. Care plan discussed with ICU care team. Patient remained critical and at risk for life threatening decompensation.     By signing my name below, I, Aparna Saleh, attest that this documentation has been prepared under the direction and in the presence of Jaclyn Mendoza NP   Electronically signed: Cesia Villegas, 10-23-21 @ 10:36    I, Jaclyn Mendoza, personally performed the services described in this documentation. all medical record entries made by the scribe were at my direction and in my presence. I have reviewed the chart and agree that the record reflects my personal performance and is accurate and complete  Electronically signed: Jaclyn Mendoza NP        RICKY HAMPTON  MRN-[id number]  Patient is a 66y old  Male who presents with a chief complaint of Anemia, Supratherapeutic INR, Dark Stools (22 Oct 2021 19:41)    HPI:  65M PMH ACC/AHA stage D HF due to NICM HM2 LVAD , TV annuloplasty ring 17 as destination therapy due to severe peripheral artery disease with significant stenosis  SIADH, Depression, CKD-3 with hyperkalemia, past E. coli UTIs, driveline drainage (21) and COVID-19 (back in 2020)  He was recently seen in clinic where he complained of abdominal pain and dark stools w constipation back in May. He presents to [facility] ER today weakness and fatigue, moderate and + Black stools for three days, on coumadin secondary to warfarin use in the setting of an LVAD. Patient has required transfusions for GIB in the past. Mostly recently back in 2021 pt had anemia with dark stools. No interventions was done at that time. However Last Endoscopy was done in 2020 (negative). Today labs show patient is anemic with H/H of 4.5/16.3,. INR is 8.84 MAP in the 90s, Temp 35.1. He denies any chest pain, shortness of breath, dizziness, abd pain, nausea or vomiting.       (2021 16:57)      Surgery/Hospital Course:   admit for melena w/ anemia, INR 8.84   6/15 Capsul study (+) for small bowel bleed, balloon endoscopy (old blood in prox ileum); post EGD - septic w/ L opacity, re-intubated for concern for aspiration, TTE (Mod MR, decrease biV w/ interventricular septum boweing towards R)   bronch    +C Diff    CT C/A/P: Fluid filled colon which may be 2/2 rapid transit. Small bilateral pleffs with associates. Compressive atelectasis New ISABELLE & LLL  parenchymal opacities, suspicious for pneumonia. Moderate stenosis in the proximal superior mesenteric artery.    #8 Shiley trach at bedside    LVAD speed increased to 9200   Bronch   TC since . Patient transferred to SDU.    INR today 2.64.  H&H 7.3/24 this AM.  Will repeat CBC at noon, and will send stool guaiac Patient with persistent abdominal tenderness, rate of tube feeds decreased.  No nausea/vomiting.     INR today 2.4. H&H 9.1/28.6 low flow overnight /N&V, refusing Tube feeds on D5 1/2 normal  @50 cc/hr   INR 2.69  H&H 7.7/.1 refusing Tube feeds on D5 1/2 normal  @50 cc/hr. This am + BM Melena Dr Oneill HF  aware- PRBC x1  GI team consulted -  NPO  plan on study in am-  D/w Dr Cadet Patient  to return to CTU for further management; 1PRBCS    Post op INR 2.2 today.  No bleeding. BC + for SM.  Pt is hypotensive requiring pressor and inotropic support.  ID follow up today on Cefepime will follow.   R PTC for PTX    CT C/A/P: sub q emphysema in R chest wall, GGO RUL, small ascites CTH negative; Abd US: GB thickening, pericholecystic fluid     Perchole drain in place continues to drain total output overnight 133.  Fever today 38.8    duplex LE negative    Patient with persistent abdominal pain, refusing tube feeds and medications, Psych consulted   CTA A/P ordered to r/o mesenteric ischemia 2/2 persistent anorexia, nausea, vomiting. Revealed:  Evaluation of the mesenteric vessels is limited by streak artifact from LVAD. There appears to be severe stenosis of the proximal SMA; abdominal mesenteric doppler is recommended for further evaluation. 2.  No small bowel findings to suggest acute mesenteric ischemia. 3.  Focal dissections involving the right and left common iliac arteries.  8/15: Cultures resulted BC 1/2 +GPC in clusters, SC enterobacter; mesenteric duplex: borderline stenosis of proximal SMA  : CT C/A/P noncon: Nondular opacities in R lung apex w/cavitation, abd nl  :  Continue current care, treatment of thyrotoxicosis with medications as per endocrine, d/c ABX as per team.    RUQ sono: Contracted gallbladder with cholecystostomy tube in place.  9/10 failed SMA stent, c/b RP bleed s/p 3U PRCB   CTA Abd/Pelvis L RP hematoma    Trach decannulated    intubated fro resp distress for increased WOB, LL pneumonia; CT C/A/P: progressive ISABELLE opacities and L consolidations, multifocal pneumonia    TTE (EF 25%, decreased RV, mod MR)   10/3 VT -> Lidocaine gtt   10/4 Trach size 6 DCT cuff  10/5 CT abd (neg for intra-abd abcess, severe stenosis of prox SMA and b/l iliac arteries)  10/18 SMA Stent     Today:  - Drop in H/H today, plan to transfuse  - Continue with trach collar and suctioning  - Continue to ambulate       REVIEW OF SYSTEMS:  Speak over trach   Gen: No fever  EYES/ENT: No visual changes;  No vertigo or throat pain   NECK: No pain   RES:  No shortness of breath or Cough  CARD: No chest pain   GI: +intermittently c/o abd pain and nausea   : No dysuria  NEURO: No weakness  SKIN: No itching, rashes     ICU Vital Signs Last 24 Hrs  T(C): 36 (23 Oct 2021 08:00), Max: 37.6 (22 Oct 2021 16:00)  T(F): 96.8 (23 Oct 2021 08:00), Max: 99.7 (22 Oct 2021 16:00)  HR: 122 (23 Oct 2021 09:00) (103 - 133)  BP: --  BP(mean): --  ABP: 122/81 (23 Oct 2021 08:00) (71/61 - 122/81)  ABP(mean): 97 (23 Oct 2021 08:00) (66 - 109)  RR: 20 (23 Oct 2021 09:16) (15 - 36)  SpO2: 98% (23 Oct 2021 09:16) (89% - 100%)      Physical Exam:  Gen:  NAD  CNS: moves all extremities to command   Neck: no JVD, +trach, +central line   RES : coarse breath sounds, no wheezing    CVS: +LVAD hum   Abd: Soft. Sybil drain with bilious fluid. Positive BS throughout. Mild RUQ abdominal tenderness by perc sybil.  Skin: No rash, erythema, cyanosis.  Vasc: Warm and well-perfused.  Ext:  no edema    ============================I/O===========================   I&O's Detail    22 Oct 2021 07:01  -  23 Oct 2021 07:00  --------------------------------------------------------  IN:    Enteral Tube Flush: 60 mL    IV PiggyBack: 1504 mL    multiple electrolytes Injection Type 1.: 60 mL    sodium chloride 0.9%: 210 mL  Total IN: 1834 mL    OUT:    Drain (mL): 80 mL    Emesis (mL): 303 mL    Voided (mL): 750 mL  Total OUT: 1133 mL    Total NET: 701 mL      23 Oct 2021 07:01  -  23 Oct 2021 10:36  --------------------------------------------------------  IN:    sodium chloride 0.9%: 60 mL  Total IN: 60 mL    OUT:    Drain (mL): 1 mL    Miscellaneous Tube Feedin mL  Total OUT: 1 mL    Total NET: 59 mL        ============================ LABS =========================                        7.6    21.01 )-----------( 239      ( 23 Oct 2021 01:08 )             22.4     10-23    127<L>  |  90<L>  |  21  ----------------------------<  111<H>  4.6   |  23  |  0.60    Ca    9.2      23 Oct 2021 01:08  Phos  3.4     10-23  Mg     1.9     10-23    TPro  7.2  /  Alb  3.6  /  TBili  0.8  /  DBili  x   /  AST  10  /  ALT  13  /  AlkPhos  219<H>  10-    LIVER FUNCTIONS - ( 23 Oct 2021 01:08 )  Alb: 3.6 g/dL / Pro: 7.2 g/dL / ALK PHOS: 219 U/L / ALT: 13 U/L / AST: 10 U/L / GGT: x             ABG - ( 23 Oct 2021 01:06 )  pH, Arterial: 7.43  pH, Blood: x     /  pCO2: 41    /  pO2: 132   / HCO3: 27    / Base Excess: 2.6   /  SaO2: 100.0               ======================Micro/Rad/Cardio=================  Culture: Reviewed   CXR: Reviewed  Echo:Reviewed  ======================================================  PAST MEDICAL & SURGICAL HISTORY:  CHF (congestive heart failure)    CAD (coronary artery disease)    Depression    Pleural effusion    History of 2019 novel coronavirus disease (COVID-19)  2020    Hemorrhoids    Bleeding hemorrhoids    Peripheral arterial disease    Claudication    BPH with urinary obstruction    ACC/AHA stage D systolic heart failure    Anticoagulation goal of INR 2.0 to 2.5    Falls    Clavicle fracture    CKD (chronic kidney disease), stage III    Iron deficiency anemia    H/O epistaxis    Vertigo    GI bleed    S/P TVR (tricuspid valve repair)    S/P ventricular assist device    S/P endoscopy      ====================ASSESSMENT ==============  Stage D Nonischemic Cardiomyopathy, Status Post HM2 on 2017    Cardiogenic shock  Hemodynamic instability   Acute hypoxemic respiratory failure s/p trach , decannulated on ; Reintubated on    GI bleed , Status Post Enteroscopy   Anemia, in setting of melena   Chronic Kidney Disease  Stress hyperglycemia   C.diff positive on    Hypovolemic shock  Septic shock  Leukocytosis  GB thickening/percholecystic s/p perc sybil by IR   SMA stenosis s/p SMA Stent on 10/18   Serratia/citrobacter pneumonia   Stenotrophomonas pneumonia   Enterobacter pneumonia   Nasuea/vomiting  Deconditioning    Plan:  ====================== NEUROLOGY=====================  Continue close monitoring of neuro status   C/w sertraline, prochlorperazine, and mirtazapine for depression   Fentanyl and Tylenol for pain      acetaminophen   IVPB .. 1000 milliGRAM(s) IV Intermittent once  fentaNYL    Injectable 12 MICROGram(s) IV Push every 3 hours PRN Moderate to severe pain  mirtazapine 7.5 milliGRAM(s) Oral daily  prochlorperazine   Tablet 10 milliGRAM(s) Oral three times a day  sertraline 100 milliGRAM(s) Oral daily    ==================== RESPIRATORY======================  Acute hypoxemic respiratory failure s/p #8 shiley trach on ; decannulated on ; Reintubated on ; trach size 6 DCT cuff on 10/4   Continue close monitoring of respiratory rate and breathing pattern, following of ABG’s with [place] monitoring, continuous pulse oximetry monitoring.   Tolerating TC since 10/19, continue as tolerated   Guaifenesin PO PRN mucous plugging     guaiFENesin Oral Liquid (Sugar-Free) 200 milliGRAM(s) Oral every 4 hours PRN mucous plugging    ====================CARDIOVASCULAR==================  Stage D Nonischemic Cardiomyopathy, Status Post HM2 on 2017; LVAD settings 9200, flow 5.0  TTE 10/4:  Severely decreased LV systolic function, Diffuse hypokinesis. Mild AR. No pericardial effusion   VT on 10/4, s/p Lidocaine drip, continue close tele monitoring   ASA/Plavix for recent SMA stent on hold due to drop in h/h    aspirin  chewable 81 milliGRAM(s) Oral daily  clopidogrel Tablet 75 milliGRAM(s) Oral daily    ===================HEMATOLOGIC/ONC ===================  CTA A/P on  +L RP hematoma   Acute blood loss anemia, monitor H&H/Plts   Plan to give PRBC, repeat h/h  LUE US on 10/20 with no pseudoaneurysm    ===================== RENAL =========================  Hx of CKD, continue monitoring urine output, I&OS, BUN/Cr   Replete lytes PRN. Keep K> 4 and Mg >2    ==================== GASTROINTESTINAL===================  Tolerating tube feeds, Vital AF @ 30cc/hr, with goal 45cc/hr   CT A/P 10/5 neg for intra-abd abcess, severe stenosis of prox SMA and b/l iliac arteries  CTA A/P : Evaluation of the mesenteric vessels is limited by streak artifact from LVAD. There appears to be severe stenosis of the proximal SMA; abdominal mesenteric doppler is recommended for further evaluation. 2.  No small bowel findings to suggest acute mesenteric ischemia. 3.  Focal dissections involving the right and left common iliac arteries.  Stenosis of proximal SMA, s/p failed SMA stent c/b RP bleed on 9/10 - SMA stent with Vascular 10/18   Simethicone PRN for gas   Reglan for gut motility  Zofran and fosaprepitant for nausea     multivitamin 1 Tablet(s) Oral daily  pantoprazole  Injectable 40 milliGRAM(s) IV Push every 12 hours  saline laxative (FLEET) Rectal Enema 1 Enema Rectal once  simethicone 80 milliGRAM(s) Chew every 8 hours PRN Gas  sodium chloride 0.9%. 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   CT C/A/P : progressive ISABELLE opacities and L consolidations, multifocal pneumonia  BCx 1/2 on 10/1 +Enterobacter cloacae complex, BCx  10/14+ Serratia marcescens   BCx 10/14 and SCX on 10/14 +Stenotrophomonas maltophilia, c/w Bactrim and vancomycin     trimethoprim / sulfamethoxazole IVPB 300 milliGRAM(s) IV Intermittent every 6 hours  vancomycin  IVPB 1000 milliGRAM(s) IV Intermittent every 12 hours      Patient requires continuous monitoring with bedside rhythm monitoring, pulse ox monitoring, and intermittent blood gas analysis. Care plan discussed with ICU care team. Patient remained critical and at risk for life threatening decompensation.     By signing my name below, I, Aparna Saleh, attest that this documentation has been prepared under the direction and in the presence of Jaclyn Mendoza NP   Electronically signed: Cesia Villegas, 10-23-21 @ 10:36    I, Jaclyn Mendoza, personally performed the services described in this documentation. all medical record entries made by the scribe were at my direction and in my presence. I have reviewed the chart and agree that the record reflects my personal performance and is accurate and complete  Electronically signed: Jaclyn Mendoza NP

## 2021-10-23 NOTE — PROGRESS NOTE ADULT - ASSESSMENT
Assessment and Recommendation:   · Assessment	  Assessment and recommendation :  Recurrent Acute hypoxic respiratory Failure  FI02 is 40% S/P tracheostomy  on track. collar   Acute blood loss anemia S/P multiple  blood transfusion post tracheostomy   recurrent  septic shock  weaned off  vasopressin   pan culture, fever  on IV bactrim  ,IV vancomycin and  po vancomycin   S/P cholecystostomy tube placement by IR    patient is S/P  repeat vacular procedure and SMA stent x2 complicated with LUE hematoma   AF RVR back to regular sinus Rhythm   Non ischemic cardiomyopathy continue ACE inhibitor and B-Blockers   hyponatremia fluid restriction   S/P 3 unit of blood transfusion   Stage D systolic heart failure S/P LVAD HM2   MH2 LVAD  with  TV Annuloplasty  Severe peripheral vascular disease   severe hyperglycemia on insulin coverage    Reglan 10 mg for Gastric Motility   hyperthyroidism on Methimazole 10mg TID   critical care polyneuropathy   Anemia of Acute blood Loss   severe protein caloric malnutrition   magnesium supplement keep above 2   Chronic kidney disease stage III  Depressed and withdrawn   resume NG tube feeding   GI prophylaxis with PPI   Discussed with TCV team

## 2021-10-23 NOTE — PROGRESS NOTE ADULT - SUBJECTIVE AND OBJECTIVE BOX
RICKY JOINT  MRN#: [id number]  Subjective:  Pulmonary progress  : recurrent Acute hypoxic respiratory Failure ,aspiration pneumonia, NICM  ,   65M PMH ACC/AHA stage D HF due to NICM HM2 LVAD , TV annuloplasty ring 17 as destination therapy due to severe peripheral artery disease with significant stenosis  SIADH, Depression, CKD-3 with hyperkalemia, past E. coli UTIs, driveline drainage (21) and COVID-19 (back in 2020)  He was recently seen in clinic where he complained of abdominal pain and dark stools w constipation back in May. He presents to [facility] ER today weakness and fatigue, moderate and + Black stools for three days, on coumadin secondary to warfarin use in the setting of an LVAD. Patient has required transfusions for GIB in the past. Mostly recently back in 2021 pt had anemia with dark stools. No interventions was done at that time. However Last Endoscopy was done in 2020 (negative). Today labs show patient is anemic with H/H of 4.5/16.3,. INR is 8.84 MAP in the 90s, Temp 35.1. He denies any chest pain, shortness of breath, dizziness, abd pain, nausea or vomiting. found to have  rectal bleeding underwent endoscopy ,old blood in the proximal ileum ,  develop sepsis with LL opacity given Antibiotics , Extubated , reintubated , Bronchoscopy on Zosyn for LL pneumonia  and Amiodarone S/P TV Annuloplasty , patient remain intubated on full ventilatory support .S/P multiple units of blood transfusion , remain on full ventilatory support on Precedex and propofol , new central IJ line , diarrhea C diff. +ve on po vancomycin and IV Flagyl,  mildly distended belly , fever start on cefepime 2gm q 8 hrs S/P tracheostomy .  new RT Subclavian central line continue on contact  isolation ,S/P  C-diff antibiotics, no more diarrhea back on full support mechanical ventilator , chest x ray show improvement in LLL air space disease, more awake and responsive on tube feeding no more diarrhea ,  no nausea or vomiting or diarrhea still very weak and tired , back on tube feeding ,still on po vancomycin , getting PT and OT at bed side ,   , no SOB getting stronger , improve muscle tone patient transfer to monitor bed still on contact isolation for C-Difficel colitis on 50% FI02, and change to Suresh  tube feeding still loose stool . H/H drop significantly require blood transfusion , most likely GI bleeding , IV heparin D/C ,  H/H is stable ., patient develop TR sided  pneumothorax require chest tube placement , RT IJ central line  placed , develop fever shaking chills , blood culture positive for serratia marcescens , start on cefepime .the patient  become hypoxic and hypotensive placed on full ventilatory support and Vasopressin , levophed and Dobutamine ,S/P blood transfusion on meropenem and vancomycin ,   , on and  off pressors , occasional agitation on Precedex .S/P IR cholecystostomy tube drainage placement in the RT upper Quadrant , resume anticoagulation chest x ray noted C-PAP trail lasted only for 2 hrs , new RT SC line and D/C RT IJ line , RT pig tail cathter has been removed , tolerating C-PAP trial placed on trach. collar 50% FI02 GI consultant noted on NG tube feeding as tolerated , develop AF RVR S/P  bolus Amiodarone  back to regular sinus Rhythm , Flat Affect depressed , back on tube feeding Vital AF at 60 cc/ hr .still intermittent abdominal pain , no fever saturation is accepted  back on full ventilatory support ,   on methimazole for hyperthyroidism ,  condition is the same ,still C/O Abdominal pain , white count is improving , no chest pain or SOB ,  .placed on Reglan 10 mg TID for gastric motility , depressed , withdrawn. , S/P  mesenteric angiogram , unable to stent SMA S/P 3 units of blood transfusion   RT IJ in place reassessed for stent in the SMA by vascular,  dark stool stable H/H ,surgical opinion for possible Lap cholecystectomy. over night events noted develop respiratory distress, , rectal bleeding, require intubation placed on full ventilatory support , FI02 is 50% also became hemianosmically unstable require triple pressor support with levophed , vasopressin and norsynephrine, pan culture placed  on Vancomycin IV and PO  and Zosyn, sedated with propofol kept NPO , new central line RT and left IJ cathter placed . Diflucan Added .fever of 38.5 weaned off  vasopressin ,   fever adding IV vancomycin and  vancomycin solution  , and Bactrim , S/P  tracheostomy , bleeding receiving blood transfusion on vasopressin , no more bleeding , no fever , more awake and responsive ,tolerating tube feeding , ID and heart failure follow up appreciated , depresses no weaning for now , magnesium is low to give supplement too keep Above 2 , patient S/P  vascular procedure for superior mesenteric artery occlusion S/P 2 stent placement , patient tolerate it very well  , left upper extremities hematoma .vascular follow up appreciated , increase WBC , new RT UPE midline      (2021 16:57)    PAST MEDICAL & SURGICAL HISTORY:  CHF (congestive heart failure)    CAD (coronary artery disease)  Depression    Pleural effusion    History of 2019 novel coronavirus disease (COVID-19)  2020    Hemorrhoids    Bleeding hemorrhoids    Peripheral arterial disease    Claudication    BPH with urinary obstruction    ACC/AHA stage D systolic heart failure  Anticoagulation goal of INR 2.0 to 2.5    Falls    Clavicle fracture    CKD (chronic kidney disease), stage III    Iron deficiency anemia    H/O epistaxis    Vertigo    GI bleed    S/P TVR (tricuspid valve repair)    S/P ventricular assist device    S/P endoscopy    OBJECTIVE:  ICU Vital Signs Last 24 Hrs  T(C): 38.2 (2021 10:00), Max: 38.5 (2021 12:00)  T(F): 100.8 (2021 10:00), Max: 101.3 (2021 12:00)  HR: 65 (2021 10:00) (61 - 69)  BP: --  BP(mean): --  ABP: 105/67 (2021 10:00) (90/54 - 113/64)  ABP(mean): 77 (2021 10:00) (63 - 77)  RR: 20 (2021 10:00) (19 - 35)  SpO2: 99% (2021 10:00) (96% - 100%)       @ 07: @ 07:00  --------------------------------------------------------  IN: 2693.9 mL / OUT: 1415 mL / NET: 1278.9 mL     @ 07: @ 10:49  --------------------------------------------------------  IN: 420.8 mL / OUT: 115 mL / NET: 305.8 mL  PHYSICAL EXAM: Daily   Elderly male  on trach. collar  FI02 is 40% S/P tracheostomy   Daily Weight in k.4 (2021 00:00)  HEENT:     + NCAT  + EOMI  - Conjuctival edema   - Icterus   - Thrush   - ETT  + NGT/OGT  Neck:         + FROM  + JVD  - Nodes - Masses + Mid-line trachea + Tracheostomy  Chest:            normal A-P diameter    Lungs:          + CTA   + Rhonchi    - Rales    - Wheezing + Decreased  LT BS   - Dullness R L  Cardiac:       + S1 + S2    + RRR   - Irregular   - S3  - S4    - Murmurs   - Rub   - Hamman’s sign   Abdomen:    + BS  + Soft + Non-tender - Distended - Organomegaly - PEG .cholecystostomy tube in place  Extremities:   - Cyanosis U/L   - Clubbing  U/L  + LE/UE Edema LUE hematoma   + Capillary refill    + Pulses   Neuro:        - Awake   -  Alert   - Confused   - Lethargic   + Sedated  + Generalized Weakness  Skin:        - Rashes    - Erythema   + Normal incisions   + IV sites intact          HOSPITAL MEDICATIONS: All medications reviewed and analyzed  MEDICATIONS  (STANDING):  amiodarone    Tablet 200 milliGRAM(s) Oral daily  chlorhexidine 0.12% Liquid 15 milliLiter(s) Oral Mucosa every 12 hours  chlorhexidine 2% Cloths 1 Application(s) Topical <User Schedule>  dexmedetomidine Infusion 0.5 MICROgram(s)/kG/Hr (9.81 mL/Hr) IV Continuous <Continuous>  dextrose 50% Injectable 50 milliLiter(s) IV Push every 15 minutes  heparin  Infusion 400 Unit(s)/Hr (12.5 mL/Hr) IV Continuous <Continuous>  Hydromorphone  Injectable 0.5 milliGRAM(s) IV Push once  insulin lispro (ADMELOG) corrective regimen sliding scale   SubCutaneous every 6 hours  pantoprazole  Injectable 40 milliGRAM(s) IV Push every 12 hours  piperacillin/tazobactam IVPB.. 3.375 Gram(s) IV Intermittent every 8 hours  propofol Infusion 20 MICROgram(s)/kG/Min (9.42 mL/Hr) IV Continuous <Continuous>  sodium chloride 0.9% lock flush 3 milliLiter(s) IV Push every 8 hours  sodium chloride 0.9%. 1000 milliLiter(s) (10 mL/Hr) IV Continuous <Continuous>    MEDICATIONS  (PRN):  acetaminophen    Suspension .. 650 milliGRAM(s) Oral every 6 hours PRN Temp greater or equal to 38C (100.4F)    LABS: All Lab data reviewed and analyzed                        7.1    17.93 )-----------( 234      ( 23 Oct 2021 11:13 )             20.6                10-23    127<L>  |  90<L>  |  21  ----------------------------<  111<H>  4.6   |  23  |  0.60    Ca    9.2      23 Oct 2021 01:08  Phos  3.4     10-  Mg     1.9     10-    TPro  7.2  /  Alb  3.6  /  TBili  0.8  /  DBili  x   /  AST  10  /  ALT  13  /  AlkPhos  219<H>  10-23    Ca    9.2      22 Oct 2021 01:08  Phos  2.7     10-  Mg     1.8     10-    TPro  7.5  /  Alb  3.8  /  TBili  0.7  /  DBili  x   /  AST  15  /  ALT  18  /  AlkPhos  206<H>  10-22    Ca    9.5      21 Oct 2021 00:33  Phos  2.8     10-21  Mg     1.7     10-    TPro  7.8  /  Alb  4.0  /  TBili  0.8  /  DBili  x   /  AST  17  /  ALT  20  /  AlkPhos  192<H>  10                                                                                                                                                                            PTT - ( 2021 04:52 )  PTT:45.2 sec LIVER FUNCTIONS - ( 2021 00:42 )  Alb: 3.4 g/dL / Pro: 6.7 g/dL / ALK PHOS: 213 U/L / ALT: 15 U/L / AST: 24 U/L / GGT: x           RADIOLOGY: - Reviewed and analyzed RT Pig tail cathter  , LVAD HM2, CT scan of abdomen reviewed result noted

## 2021-10-24 NOTE — PROGRESS NOTE ADULT - PROBLEM SELECTOR PLAN 7
- On methimazole and hydrocortisone taper as per endocrinology - Has been transfused multiple times throughout this admission, last transfusion was on 10/23  -Antiplatelet currently held for bleeding. Favor at least 1 antiplatelet agent given fresh stent however defer to vascular

## 2021-10-24 NOTE — PROGRESS NOTE ADULT - SUBJECTIVE AND OBJECTIVE BOX
RICKY JOINT  MRN#: [id number]  Subjective:  Pulmonary progress  : recurrent Acute hypoxic respiratory Failure ,aspiration pneumonia, NICM  ,   65M PMH ACC/AHA stage D HF due to NICM HM2 LVAD , TV annuloplasty ring 17 as destination therapy due to severe peripheral artery disease with significant stenosis  SIADH, Depression, CKD-3 with hyperkalemia, past E. coli UTIs, driveline drainage (21) and COVID-19 (back in 2020)  He was recently seen in clinic where he complained of abdominal pain and dark stools w constipation back in May. He presents to [facility] ER today weakness and fatigue, moderate and + Black stools for three days, on coumadin secondary to warfarin use in the setting of an LVAD. Patient has required transfusions for GIB in the past. Mostly recently back in 2021 pt had anemia with dark stools. No interventions was done at that time. However Last Endoscopy was done in 2020 (negative). Today labs show patient is anemic with H/H of 4.5/16.3,. INR is 8.84 MAP in the 90s, Temp 35.1. He denies any chest pain, shortness of breath, dizziness, abd pain, nausea or vomiting. found to have  rectal bleeding underwent endoscopy ,old blood in the proximal ileum ,  develop sepsis with LL opacity given Antibiotics , Extubated , reintubated , Bronchoscopy on Zosyn for LL pneumonia  and Amiodarone S/P TV Annuloplasty , patient remain intubated on full ventilatory support .S/P multiple units of blood transfusion , remain on full ventilatory support on Precedex and propofol , new central IJ line , diarrhea C diff. +ve on po vancomycin and IV Flagyl,  mildly distended belly , fever start on cefepime 2gm q 8 hrs S/P tracheostomy .  new RT Subclavian central line continue on contact  isolation ,S/P  C-diff antibiotics, no more diarrhea back on full support mechanical ventilator , chest x ray show improvement in LLL air space disease, more awake and responsive on tube feeding no more diarrhea ,  no nausea or vomiting or diarrhea still very weak and tired , back on tube feeding ,still on po vancomycin , getting PT and OT at bed side ,   , no SOB getting stronger , improve muscle tone patient transfer to monitor bed still on contact isolation for C-Difficel colitis on 50% FI02, and change to Suresh  tube feeding still loose stool . H/H drop significantly require blood transfusion , most likely GI bleeding , IV heparin D/C ,  H/H is stable ., patient develop TR sided  pneumothorax require chest tube placement , RT IJ central line  placed , develop fever shaking chills , blood culture positive for serratia marcescens , start on cefepime .the patient  become hypoxic and hypotensive placed on full ventilatory support and Vasopressin , levophed and Dobutamine ,S/P blood transfusion on meropenem and vancomycin ,   , on and  off pressors , occasional agitation on Precedex .S/P IR cholecystostomy tube drainage placement in the RT upper Quadrant , resume anticoagulation chest x ray noted C-PAP trail lasted only for 2 hrs , new RT SC line and D/C RT IJ line , RT pig tail cathter has been removed , tolerating C-PAP trial placed on trach. collar 50% FI02 GI consultant noted on NG tube feeding as tolerated , develop AF RVR S/P  bolus Amiodarone  back to regular sinus Rhythm , Flat Affect depressed , back on tube feeding Vital AF at 60 cc/ hr .still intermittent abdominal pain , no fever saturation is accepted  back on full ventilatory support ,   on methimazole for hyperthyroidism ,  condition is the same ,still C/O Abdominal pain , white count is improving , no chest pain or SOB ,  .placed on Reglan 10 mg TID for gastric motility , depressed , withdrawn. , S/P  mesenteric angiogram , unable to stent SMA S/P 3 units of blood transfusion   RT IJ in place reassessed for stent in the SMA by vascular,  dark stool stable H/H ,surgical opinion for possible Lap cholecystectomy. over night events noted develop respiratory distress, , rectal bleeding, require intubation placed on full ventilatory support , FI02 is 50% also became hemianosmically unstable require triple pressor support with levophed , vasopressin and norsynephrine, pan culture placed  on Vancomycin IV and PO  and Zosyn, sedated with propofol kept NPO , new central line RT and left IJ cathter placed . Diflucan Added .fever of 38.5 weaned off  vasopressin ,   fever adding IV vancomycin and  vancomycin solution  , and Bactrim , S/P  tracheostomy , bleeding receiving blood transfusion on vasopressin , no more bleeding , no fever , more awake and responsive ,tolerating tube feeding , ID and heart failure follow up appreciated , depresses no weaning for now , magnesium is low to give supplement too keep Above 2 , patient S/P  vascular procedure for superior mesenteric artery occlusion S/P 2 stent placement , patient tolerate it very well  , left upper extremities hematoma .vascular follow up appreciated , increase WBC , new RT UPE midline , black tarry stool , very loose R/O recurrent GI bleeding and C-dificile  colitis      (2021 16:57)    PAST MEDICAL & SURGICAL HISTORY:  CHF (congestive heart failure)    CAD (coronary artery disease)  Depression    Pleural effusion    History of 2019 novel coronavirus disease (COVID-19)  2020    Hemorrhoids    Bleeding hemorrhoids    Peripheral arterial disease    Claudication    BPH with urinary obstruction    ACC/AHA stage D systolic heart failure  Anticoagulation goal of INR 2.0 to 2.5    Falls    Clavicle fracture    CKD (chronic kidney disease), stage III    Iron deficiency anemia    H/O epistaxis    Vertigo    GI bleed    S/P TVR (tricuspid valve repair)    S/P ventricular assist device    S/P endoscopy    OBJECTIVE:  ICU Vital Signs Last 24 Hrs  T(C): 38.2 (2021 10:00), Max: 38.5 (2021 12:00)  T(F): 100.8 (2021 10:00), Max: 101.3 (2021 12:00)  HR: 65 (2021 10:00) (61 - 69)  BP: --  BP(mean): --  ABP: 105/67 (2021 10:00) (90/54 - 113/64)  ABP(mean): 77 (2021 10:00) (63 - 77)  RR: 20 (2021 10:00) (19 - 35)  SpO2: 99% (2021 10:00) (96% - 100%)       @ 07:  -   @ 07:00  --------------------------------------------------------  IN: 2693.9 mL / OUT: 1415 mL / NET: 1278.9 mL     @ 07:01  -   @ 10:49  --------------------------------------------------------  IN: 420.8 mL / OUT: 115 mL / NET: 305.8 mL  PHYSICAL EXAM: Daily   Elderly male  on trach. collar  FI02 is 40% S/P tracheostomy   Daily Weight in k.4 (2021 00:00)  HEENT:     + NCAT  + EOMI  - Conjuctival edema   - Icterus   - Thrush   - ETT  + NGT/OGT  Neck:         + FROM  + JVD  - Nodes - Masses + Mid-line trachea + Tracheostomy  Chest:            normal A-P diameter    Lungs:          + CTA   + Rhonchi    - Rales    - Wheezing + Decreased  LT BS   - Dullness R L  Cardiac:       + S1 + S2    + RRR   - Irregular   - S3  - S4    - Murmurs   - Rub   - Hamman’s sign   Abdomen:    + BS  + Soft + Non-tender - Distended - Organomegaly - PEG .cholecystostomy tube in place  Extremities:   - Cyanosis U/L   - Clubbing  U/L  + LE/UE Edema LUE hematoma   + Capillary refill    + Pulses   Neuro:        - Awake   -  Alert   - Confused   - Lethargic   + Sedated  + Generalized Weakness  Skin:        - Rashes    - Erythema   + Normal incisions   + IV sites intact          HOSPITAL MEDICATIONS: All medications reviewed and analyzed  MEDICATIONS  (STANDING):  amiodarone    Tablet 200 milliGRAM(s) Oral daily  chlorhexidine 0.12% Liquid 15 milliLiter(s) Oral Mucosa every 12 hours  chlorhexidine 2% Cloths 1 Application(s) Topical <User Schedule>  dexmedetomidine Infusion 0.5 MICROgram(s)/kG/Hr (9.81 mL/Hr) IV Continuous <Continuous>  dextrose 50% Injectable 50 milliLiter(s) IV Push every 15 minutes  heparin  Infusion 400 Unit(s)/Hr (12.5 mL/Hr) IV Continuous <Continuous>  Hydromorphone  Injectable 0.5 milliGRAM(s) IV Push once  insulin lispro (ADMELOG) corrective regimen sliding scale   SubCutaneous every 6 hours  pantoprazole  Injectable 40 milliGRAM(s) IV Push every 12 hours  piperacillin/tazobactam IVPB.. 3.375 Gram(s) IV Intermittent every 8 hours  propofol Infusion 20 MICROgram(s)/kG/Min (9.42 mL/Hr) IV Continuous <Continuous>  sodium chloride 0.9% lock flush 3 milliLiter(s) IV Push every 8 hours  sodium chloride 0.9%. 1000 milliLiter(s) (10 mL/Hr) IV Continuous <Continuous>    MEDICATIONS  (PRN):  acetaminophen    Suspension .. 650 milliGRAM(s) Oral every 6 hours PRN Temp greater or equal to 38C (100.4F)    LABS: All Lab data reviewed and analyzed                         8.0    11.54 )-----------( 186      ( 24 Oct 2021 12:50 )             24.2   10-24    124<L>  |  90<L>  |  15  ----------------------------<  69<L>  4.2   |  22  |  0.54    Ca    8.9      24 Oct 2021 01:10  Phos  2.4     10-24  Mg     1.7     10-    TPro  6.6  /  Alb  3.4  /  TBili  0.8  /  DBili  x   /  AST  13  /  ALT  11  /  AlkPhos  191<H>  10-    Ca    9.2      23 Oct 2021 01:08  Phos  3.4     10-23  Mg     1.9     10-    TPro  7.2  /  Alb  3.6  /  TBili  0.8  /  DBili  x   /  AST  10  /  ALT  13  /  AlkPhos  219<H>  10-    Ca    9.2      22 Oct 2021 01:08  Phos  2.7     10-22  Mg     1.8     10-    TPro  7.5  /  Alb  3.8  /  TBili  0.7  /  DBili  x   /  AST  15  /  ALT  18  /  AlkPhos  206<H>  10-    Ca    9.5      21 Oct 2021 00:33  Phos  2.8     10-21  Mg     1.7     10-    TPro  7.8  /  Alb  4.0  /  TBili  0.8  /  DBili  x   /  AST  17  /  ALT  20  /  AlkPhos  192<H>  10-                                                                                                                                                                            PTT - ( 2021 04:52 )  PTT:45.2 sec LIVER FUNCTIONS - ( 2021 00:42 )  Alb: 3.4 g/dL / Pro: 6.7 g/dL / ALK PHOS: 213 U/L / ALT: 15 U/L / AST: 24 U/L / GGT: x           RADIOLOGY: - Reviewed and analyzed RT Pig tail cathter  , LVAD HM2, CT scan of abdomen reviewed result noted

## 2021-10-24 NOTE — PROGRESS NOTE ADULT - PROBLEM SELECTOR PLAN 6
-   leukocytosis improving  most recent cultures negative   - currently on abx, duration and choice as per ID  - Prior cholecystitis w/ per dawn drain with bilious output. - On methimazole and hydrocortisone taper as per endocrinology

## 2021-10-24 NOTE — PROGRESS NOTE ADULT - SUBJECTIVE AND OBJECTIVE BOX
Chief Complaint/Follow-up on: hyperthyroidism    Subjective: Patient allowed me to examine him. It is hard to communicate with him verbally as he is hypophonic.    MEDICATIONS  (STANDING):  dextrose 5% + sodium chloride 0.9%. 1000 milliLiter(s) (50 mL/Hr) IV Continuous <Continuous>  insulin lispro (ADMELOG) corrective regimen sliding scale   SubCutaneous every 6 hours  methimazole 20 milliGRAM(s) Oral daily  metoclopramide Injectable 10 milliGRAM(s) IV Push every 8 hours  mirtazapine 7.5 milliGRAM(s) Oral daily  multivitamin 1 Tablet(s) Oral daily  ondansetron Injectable 8 milliGRAM(s) IV Push every 8 hours  pantoprazole  Injectable 40 milliGRAM(s) IV Push every 12 hours  prochlorperazine   Tablet 10 milliGRAM(s) Oral three times a day  sertraline 100 milliGRAM(s) Oral daily  thiamine 100 milliGRAM(s) Oral daily  trimethoprim / sulfamethoxazole IVPB 300 milliGRAM(s) IV Intermittent every 6 hours  vancomycin  IVPB 1000 milliGRAM(s) IV Intermittent every 12 hours    MEDICATIONS  (PRN):  fentaNYL    Injectable 12 MICROGram(s) IV Push every 3 hours PRN Moderate to severe pain  guaiFENesin Oral Liquid (Sugar-Free) 200 milliGRAM(s) Oral every 4 hours PRN mucous plugging  simethicone 80 milliGRAM(s) Chew every 8 hours PRN Gas      PHYSICAL EXAM:  VITALS: T(C): 35.9 (10-24-21 @ 11:00)  T(F): 96.7 (10-24-21 @ 11:00), Max: 97.9 (10-23-21 @ 16:00)  HR: 100 (10-24-21 @ 15:00) (88 - 123)  BP: --  RR:  (12 - 39)  SpO2:  (87% - 100%)  Wt(kg): --  GENERAL: NAD, well-groomed, well-developed  EYES: No proptosis, no injection  HEENT:  Atraumatic, Normocephalic, moist mucous membranes, +NGT in place  RESPIRATORY: Clear to auscultation bilaterally; No rales, rhonchi, wheezing, or rubs  CARDIOVASCULAR: Regular rate and rhythm; No murmurs; no peripheral edema      POCT Blood Glucose.: 92 mg/dL (10-24-21 @ 12:29)  POCT Blood Glucose.: 222 mg/dL (10-24-21 @ 06:49)  POCT Blood Glucose.: 82 mg/dL (10-24-21 @ 06:30)  POCT Blood Glucose.: 83 mg/dL (10-24-21 @ 05:35)  POCT Blood Glucose.: 128 mg/dL (10-24-21 @ 02:01)  POCT Blood Glucose.: 81 mg/dL (10-24-21 @ 01:33)  POCT Blood Glucose.: 110 mg/dL (10-23-21 @ 18:05)  POCT Blood Glucose.: 123 mg/dL (10-23-21 @ 12:41)  POCT Blood Glucose.: 139 mg/dL (10-23-21 @ 06:26)  POCT Blood Glucose.: 129 mg/dL (10-22-21 @ 23:44)  POCT Blood Glucose.: 154 mg/dL (10-22-21 @ 17:03)  POCT Blood Glucose.: 143 mg/dL (10-22-21 @ 12:15)  POCT Blood Glucose.: 137 mg/dL (10-22-21 @ 06:16)  POCT Blood Glucose.: 113 mg/dL (10-22-21 @ 00:57)  POCT Blood Glucose.: 137 mg/dL (10-21-21 @ 18:05)    10-24    124<L>  |  90<L>  |  15  ----------------------------<  69<L>  4.2   |  22  |  0.54    EGFR if : 128  EGFR if non : 110    Ca    8.9      10-24  Mg     1.7     10-24  Phos  2.4     10-24    TPro  6.6  /  Alb  3.4  /  TBili  0.8  /  DBili  x   /  AST  13  /  ALT  11  /  AlkPhos  191<H>  10-24          Thyroid Function Tests:  10-18 @ 13:02  FreeT4 2.5 T3 67  10-16 @ 01:37  FreeT4 -- T3 68

## 2021-10-24 NOTE — PROGRESS NOTE ADULT - ASSESSMENT
65 year old male with history of Stage D HF due to NICM on LVAD, TV annuloplasty ring 9/12/17 as destination therapy due to severe peripheral artery disease with significant stenosis  SIADH, Depression, CKD-3 with hyperkalemia, admitted 6/14/21 with symptomatic anemia with Hgb 4.5 in setting of INR 8.8, thereafter with complicated hospital course including VAP, serratia bacteremia with acalculous cholecystitis s/p percutaneous tube, abdominal pain s/p attempted SMA stent on 9/10/21, RP bleed, now reintubated in setting of aspiration PNA.   Endocrine consulted for management of hyperthyroidism in the setting of recent amiodarone use and multiple IV contrast studies (7/28, 8/13, 9/12, 9/26, 10/5) and steroid induced hyperglycemia on tube feeds, now resolved

## 2021-10-24 NOTE — PROGRESS NOTE ADULT - PROBLEM SELECTOR PLAN 5
- S/p trach 10/4 by ENT, consent given by HCP sister.  - currently tolerating trach collar  - Low O2 sats w speaking valve (?aspiration)  - Of note if patient becomes hypoxic or SOB, he is not to be placed on vent -   leukocytosis improving  most recent cultures negative   - currently on abx, duration and choice as per ID  - Prior cholecystitis w/ per dawn drain with bilious output.

## 2021-10-24 NOTE — PROGRESS NOTE ADULT - ATTENDING COMMENTS
67 y/o male with extensive pmh who's had a prolonged hospital course with multiple complications.   Events this week include  - S/p SMA stent on 10/18 via left brachial art. Had small hematoma postop. Unfortunately, abdominal pain did not improved and he continues to have nausea/vomiting. Started on DAPT with subsequent worsening anemia, positive fecal occult blood and PRBC transfusion on 10/23. DAPT on hold.   - Has not tolerated tube feedings. On multiple antiemetic medications and bowel motility stimulants. Fosaprepitant added this week.   - Had episode of desaturation when speaking valve was tried. ?aspiration. Leukocytosis since then, blood cultures remains negative. He has remained on IV bactrim/vanco. TxId is following.   - Family meeting with patient, her sister, palliative, CTS and CTICU on 10/21. Pt is not interested in hospice. He agreed with DNR and not being connected to mechanical ventilation if his clinical condition changes.

## 2021-10-24 NOTE — PROGRESS NOTE ADULT - ASSESSMENT
67 YO M with a history of stage D NICM s/p HM2 on 9/2017 as DT (due to severe PAD) with TV ring, prior COVID-19 infection 4/2020, recurrent syncope post LVAD s/p ILR, and chronic abdominal pain with prior negative workup who was admitted 6/14/21 with symptomatic anemia with Hgb 4.5 in setting of INR 8.8 without hemodynamic instability and has since had a protracted hospitalization. He was transfused and underwent VCE which showed active bleeding in the mid small bowel but subsequent enteroscopy 6/15 did not reveal any active bleeding. He acutely decompensated after procedure with fever/hypertension, low flow alarms, and pulmonary infiltrate with hypoxia requiring intubation from probable aspiration PNA. He was unable to extubated and has since undergone tracheostomy but tolerating persistent trach collar and nearing ability for decannulation. 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 He underwent angiogram on 9/10, stent was unable to be placed and course was then complicated by RP bleed. He continued to have persistent leukocytosis and febrile episodes and was noted to have positive sputum culture for serratia marcescens and completed his course of abx. He was initially decannulated on 9/23. Recently he started having multiples of melena, emesis and went into acte respiratory distress and was ultimately re-intubated on 9/26.     He had blood cultures are positive for enterobacter cloacae/serratia, remains on IV antibiotics. He is s/p trach with ENT on 10/4. Sputum cultures positive for stenotrophomonas and blood cultures positive for serratia on bactrim. S/p SMA stent x 2 on 10/18. Since undergoing the procedure he continues to have episodes of nausea/vomiting for which tube feed rates have been decreased.  He has had dropping Hb requiring PRBC as recent as 10/23 now OFF antiplatelet or AC. His overall prognosis remains poor, is now DNR.  67 YO M with a history of stage D NICM s/p HM2 on 9/2017 as DT (due to severe PAD) with TV ring, prior COVID-19 infection 4/2020, recurrent syncope post LVAD s/p ILR, and chronic abdominal pain with prior negative workup who was admitted 6/14/21 with symptomatic anemia with Hgb 4.5 in setting of INR 8.8 without hemodynamic instability and has since had a protracted hospitalization. He was transfused and underwent VCE which showed active bleeding in the mid small bowel but subsequent enteroscopy 6/15 did not reveal any active bleeding. He acutely decompensated after procedure with fever/hypertension, low flow alarms, and pulmonary infiltrate with hypoxia requiring intubation from probable aspiration PNA. He was unable to extubated and has since undergone tracheostomy but tolerating persistent trach collar and nearing ability for decannulation. 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 He underwent angiogram on 9/10, stent was unable to be placed and course was then complicated by RP bleed. He continued to have persistent leukocytosis and febrile episodes and was noted to have positive sputum culture for serratia marcescens and completed his course of abx. He was initially decannulated on 9/23. Recently he started having multiples of melena, emesis and went into acte respiratory distress and was ultimately re-intubated on 9/26.     He had blood cultures positive for enterobacter cloacae/serratia, remains on IV antibiotics. He is s/p trach with ENT on 10/4. Sputum cultures positive for stenotrophomonas and blood cultures positive for serratia on bactrim. He underwent SMA stent x 2 on 10/18. Since undergoing the procedure he continues to have episodes of nausea/vomiting for which tube feed rates have been decreased.  He H/H has been trending down requiring PRBCs on 10/23. Fecal occult blood is positive, he is currently off DAPT. His overall prognosis remains poor, he is now DNR after family meeting this week.

## 2021-10-24 NOTE — PROGRESS NOTE ADULT - SUBJECTIVE AND OBJECTIVE BOX
Interval History: Cont to vomit. Trickle tube feeds restarted. Required 2 units PRBC yesterday for dropping H/H     Medications:  dextrose 5% + sodium chloride 0.9%. 1000 milliLiter(s) IV Continuous <Continuous>  fentaNYL    Injectable 12 MICROGram(s) IV Push every 3 hours PRN  guaiFENesin Oral Liquid (Sugar-Free) 200 milliGRAM(s) Oral every 4 hours PRN  insulin lispro (ADMELOG) corrective regimen sliding scale   SubCutaneous every 6 hours  methimazole 20 milliGRAM(s) Oral daily  metoclopramide Injectable 10 milliGRAM(s) IV Push every 8 hours  mirtazapine 7.5 milliGRAM(s) Oral daily  multivitamin 1 Tablet(s) Oral daily  ondansetron Injectable 8 milliGRAM(s) IV Push every 8 hours  pantoprazole  Injectable 40 milliGRAM(s) IV Push every 12 hours  predniSONE   Tablet 10 milliGRAM(s) Oral every 24 hours  prochlorperazine   Tablet 10 milliGRAM(s) Oral three times a day  sertraline 100 milliGRAM(s) Oral daily  simethicone 80 milliGRAM(s) Chew every 8 hours PRN  thiamine 100 milliGRAM(s) Oral daily  trimethoprim / sulfamethoxazole IVPB 300 milliGRAM(s) IV Intermittent every 6 hours  vancomycin  IVPB 1000 milliGRAM(s) IV Intermittent every 12 hours      Vitals:  T(C): 35.9 (10-24-21 @ 08:00), Max: 36.6 (10-23-21 @ 16:00)  HR: 114 (10-24-21 @ 10:00) (88 - 123)  ABP: 91/58 (10-24-21 @ 10:00) (76/57 - 125/75)  ABP(mean): 71 (10-24-21 @ 10:00) (65 - 95)  RR: 25 (10-24-21 @ 10:00) (12 - 50)  SpO2: 94% (10-24-21 @ 10:00) (91% - 100%)      Daily     Daily Weight in k (24 Oct 2021 00:00)        I&O's Summary    23 Oct 2021 07:01  -  24 Oct 2021 07:00  --------------------------------------------------------  IN: 3192.5 mL / OUT: 1012 mL / NET: 2180.5 mL    24 Oct 2021 07:01  -  24 Oct 2021 12:11  --------------------------------------------------------  IN: 100 mL / OUT: 0 mL / NET: 100 mL        Physical Exam:  General: No distress. Comfortable.  Neck: +trach collar  Chest: Clear to auscultation bilaterally  CV: +VAD hum  Abdomen: Soft, non-distended, tenderness noted over epigastric region, +keotube, +billary drain  Neurology: Alert and oriented times three.    LVAD Interrogation  VAD TYPE HM 2  Speed 9200  Flow 5.6  Power 5.8 THOMPSON   PI  5.8  Assessment of driveline exit site: driveline dressing c/d/i  Programming changes: no changes made    Labs:                        8.4    13.35 )-----------( 182      ( 24 Oct 2021 01:10 )             24.5     10-24    124<L>  |  90<L>  |  15  ----------------------------<  69<L>  4.2   |  22  |  0.54    Ca    8.9      24 Oct 2021 01:10  Phos  2.4     10-24  Mg     1.7     10-24    TPro  6.6  /  Alb  3.4  /  TBili  0.8  /  DBili  x   /  AST  13  /  ALT  11  /  AlkPhos  191<H>  10-24              Lactate Dehydrogenase, Serum: 216 U/L (10-24 @ 01:10)  Lactate Dehydrogenase, Serum: 237 U/L (10-23 @ 01:08)  Lactate Dehydrogenase, Serum: 276 U/L (10-22 @ 01:08)          TELEMETRY:    Echocardiogram:

## 2021-10-24 NOTE — PROGRESS NOTE ADULT - ASSESSMENT
Assessment and Recommendation:   · Assessment	  Assessment and recommendation :  Recurrent Acute hypoxic respiratory Failure  FI02 is 40% S/P tracheostomy  on track. collar   Acute blood loss anemia S/P multiple  blood transfusion post tracheostomy   recurrent  septic shock  weaned off  vasopressin   sepsis   on IV bactrim  ,IV vancomycin and  po vancomycin   S/P cholecystostomy tube placement by IR    patient is S/P  repeat vacular procedure and SMA stent x2 complicated with LUE hematoma   AF RVR back to regular sinus Rhythm   Non ischemic cardiomyopathy continue ACE inhibitor and B-Blockers   hyponatremia fluid restriction   S/P 3 unit of blood transfusion   Stage D systolic heart failure S/P LVAD HM2   MH2 LVAD  with  TV Annuloplasty  Severe peripheral vascular disease   severe hyperglycemia on insulin coverage    Reglan 10 mg for Gastric Motility   hyperthyroidism on Methimazole 10mg TID   critical care polyneuropathy   Anemia of Acute blood Loss   severe protein caloric malnutrition   magnesium supplement keep above 2   Chronic kidney disease stage III  Depressed and withdrawn   on  NG tube feeding   GI prophylaxis with PPI   Discussed with TCV team

## 2021-10-24 NOTE — PROGRESS NOTE ADULT - PROBLEM SELECTOR PLAN 1
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 Unable to get a thyroid US due to his trach.   -s/p steroid taper down to prednisone 5mg po qdaily  Last week, we asked team to decrease MMI to 20mg po qdaily but it was not done. Team changed it today.  Continue to assess bilirubin and LFTs. MMI can have rare SE of agranulocytosis and hepatic dysfunction, but more commonly its biliary in nature.

## 2021-10-24 NOTE — PROGRESS NOTE ADULT - SUBJECTIVE AND OBJECTIVE BOX
RICKY HAMPTON  MRN-[id number]  Patient is a 66y old  Male who presents with a chief complaint of Anemia, Supratherapeutic INR, Dark Stools (23 Oct 2021 12:45)    HPI:  65M PMH ACC/AHA stage D HF due to NICM HM2 LVAD , TV annuloplasty ring 17 as destination therapy due to severe peripheral artery disease with significant stenosis  SIADH, Depression, CKD-3 with hyperkalemia, past E. coli UTIs, driveline drainage (21) and COVID-19 (back in 2020)  He was recently seen in clinic where he complained of abdominal pain and dark stools w constipation back in May. He presents to [facility] ER today weakness and fatigue, moderate and + Black stools for three days, on coumadin secondary to warfarin use in the setting of an LVAD. Patient has required transfusions for GIB in the past. Mostly recently back in 2021 pt had anemia with dark stools. No interventions was done at that time. However Last Endoscopy was done in 2020 (negative). Today labs show patient is anemic with H/H of 4.5/16.3,. INR is 8.84 MAP in the 90s, Temp 35.1. He denies any chest pain, shortness of breath, dizziness, abd pain, nausea or vomiting.       (2021 16:57)      Surgery/Hospital Course:   admit for melena w/ anemia, INR 8.84   6/15 Capsul study (+) for small bowel bleed, balloon endoscopy (old blood in prox ileum); post EGD - septic w/ L opacity, re-intubated for concern for aspiration, TTE (Mod MR, decrease biV w/ interventricular septum boweing towards R)   bronch    +C Diff    CT C/A/P: Fluid filled colon which may be 2/2 rapid transit. Small bilateral pleffs with associates. Compressive atelectasis New ISABELLE & LLL  parenchymal opacities, suspicious for pneumonia. Moderate stenosis in the proximal superior mesenteric artery.    #8 Shiley trach at bedside    LVAD speed increased to 9200   Bronch   TC since . Patient transferred to SDU.    INR today 2.64.  H&H 7.3/24 this AM.  Will repeat CBC at noon, and will send stool guaiac Patient with persistent abdominal tenderness, rate of tube feeds decreased.  No nausea/vomiting.     INR today 2.4. H&H 9.1/28.6 low flow overnight /N&V, refusing Tube feeds on D5 1/2 normal  @50 cc/hr   INR 2.69  H&H 7.7/.1 refusing Tube feeds on D5 1/2 normal  @50 cc/hr. This am + BM Melena Dr Oneill HF  aware- PRBC x1  GI team consulted -  NPO  plan on study in am-  D/w Dr Cadet Patient  to return to CTU for further management; 1PRBCS    Post op INR 2.2 today.  No bleeding. BC + for SM.  Pt is hypotensive requiring pressor and inotropic support.  ID follow up today on Cefepime will follow.   R PTC for PTX    CT C/A/P: sub q emphysema in R chest wall, GGO RUL, small ascites CTH negative; Abd US: GB thickening, pericholecystic fluid     Perchole drain in place continues to drain total output overnight 133.  Fever today 38.8    duplex LE negative    Patient with persistent abdominal pain, refusing tube feeds and medications, Psych consulted   CTA A/P ordered to r/o mesenteric ischemia 2/2 persistent anorexia, nausea, vomiting. Revealed:  Evaluation of the mesenteric vessels is limited by streak artifact from LVAD. There appears to be severe stenosis of the proximal SMA; abdominal mesenteric doppler is recommended for further evaluation. 2.  No small bowel findings to suggest acute mesenteric ischemia. 3.  Focal dissections involving the right and left common iliac arteries.  8/15: Cultures resulted BC 1/2 +GPC in clusters, SC enterobacter; mesenteric duplex: borderline stenosis of proximal SMA  : CT C/A/P noncon: Nondular opacities in R lung apex w/cavitation, abd nl  :  Continue current care, treatment of thyrotoxicosis with medications as per endocrine, d/c ABX as per team.    RUQ sono: Contracted gallbladder with cholecystostomy tube in place.  9/10 failed SMA stent, c/b RP bleed s/p 3U PRCB   CTA Abd/Pelvis L RP hematoma    Trach decannulated    intubated fro resp distress for increased WOB, LL pneumonia; CT C/A/P: progressive ISABELLE opacities and L consolidations, multifocal pneumonia    TTE (EF 25%, decreased RV, mod MR)   10/3 VT -> Lidocaine gtt   10/4 Trach size 6 DCT cuff  10/5 CT abd (neg for intra-abd abcess, severe stenosis of prox SMA and b/l iliac arteries)  10/18 SMA Stent   10/23 RUE midline     Today:    REVIEW OF SYSTEMS:  Speak over trach   Gen: No fever  EYES/ENT: No visual changes;  No vertigo or throat pain   NECK: No pain   RES:  No shortness of breath or Cough  CARD: No chest pain   GI: +intermittently c/o abd pain and nausea   : No dysuria  NEURO: No weakness  SKIN: No itching, rashes     ICU Vital Signs Last 24 Hrs  T(C): 35.8 (24 Oct 2021 04:00), Max: 36.6 (23 Oct 2021 16:00)  T(F): 96.4 (24 Oct 2021 04:00), Max: 97.9 (23 Oct 2021 16:00)  HR: 97 (24 Oct 2021 07:00) (88 - 123)  BP: --  BP(mean): --  ABP: 103/59 (24 Oct 2021 07:00) (76/57 - 148/145)  ABP(mean): 76 (24 Oct 2021 07:00) (65 - 146)  RR: 17 (24 Oct 2021 07:00) (12 - 39)  SpO2: 100% (24 Oct 2021 07:00) (95% - 100%)      Physical Exam:  Gen:  NAD  CNS: moves all extremities to command   Neck: no JVD, +trach, +central line   RES : coarse breath sounds, no wheezing    CVS: +LVAD hum   Abd: Soft. Sybil drain with bilious fluid. Positive BS throughout. Mild RUQ abdominal tenderness by perc sybil.  Skin: No rash, erythema, cyanosis.  Vasc: Warm and well-perfused.  Ext:  no edema    ============================I/O===========================   I&O's Detail    23 Oct 2021 07:01  -  24 Oct 2021 07:00  --------------------------------------------------------  IN:    dextrose 5% + sodium chloride 0.45%: 180 mL    Enteral Tube Flush: 120 mL    IV PiggyBack: 550 mL    IV PiggyBack: 1362.5 mL    Miscellaneous Tube Feedin mL    PRBCs (Packed Red Blood Cells): 300 mL    PRBCs (Packed Red Blood Cells): 300 mL    sodium chloride 0.9%: 360 mL  Total IN: 3192.5 mL    OUT:    Drain (mL): 160 mL    Emesis (mL): 2 mL    Voided (mL): 850 mL  Total OUT: 1012 mL    Total NET: 2180.5 mL        ============================ LABS =========================                        8.4    13.35 )-----------( 182      ( 24 Oct 2021 01:10 )             24.5     10-24    124<L>  |  90<L>  |  15  ----------------------------<  69<L>  4.2   |  22  |  0.54    Ca    8.9      24 Oct 2021 01:10  Phos  2.4     10-24  Mg     1.7     10-    TPro  6.6  /  Alb  3.4  /  TBili  0.8  /  DBili  x   /  AST  13  /  ALT  11  /  AlkPhos  191<H>  10-24    LIVER FUNCTIONS - ( 24 Oct 2021 01:10 )  Alb: 3.4 g/dL / Pro: 6.6 g/dL / ALK PHOS: 191 U/L / ALT: 11 U/L / AST: 13 U/L / GGT: x             ABG - ( 24 Oct 2021 00:12 )  pH, Arterial: 7.41  pH, Blood: x     /  pCO2: 40    /  pO2: 129   / HCO3: 25    / Base Excess: 0.7   /  SaO2: 100.0               ======================Micro/Rad/Cardio=================  Culture: Reviewed   CXR: Reviewed  Echo:Reviewed  ======================================================  PAST MEDICAL & SURGICAL HISTORY:  CHF (congestive heart failure)    CAD (coronary artery disease)    Depression    Pleural effusion    History of  novel coronavirus disease (COVID-19)  2020    Hemorrhoids    Bleeding hemorrhoids    Peripheral arterial disease    Claudication    BPH with urinary obstruction    ACC/AHA stage D systolic heart failure    Anticoagulation goal of INR 2.0 to 2.5    Falls    Clavicle fracture    CKD (chronic kidney disease), stage III    Iron deficiency anemia    H/O epistaxis    Vertigo    GI bleed    S/P TVR (tricuspid valve repair)    S/P ventricular assist device    S/P endoscopy      ====================ASSESSMENT ==============  Stage D Nonischemic Cardiomyopathy, Status Post HM2 on 2017    Cardiogenic shock  Hemodynamic instability   Acute hypoxemic respiratory failure s/p trach , decannulated on ; Reintubated on    GI bleed , Status Post Enteroscopy   Anemia, in setting of melena   Chronic Kidney Disease  Stress hyperglycemia   C.diff positive on    Hypovolemic shock  Septic shock  Leukocytosis  GB thickening/percholecystic s/p perc sybil by IR   SMA stenosis s/p SMA Stent on 10/18   Serratia/citrobacter pneumonia   Stenotrophomonas pneumonia   Enterobacter pneumonia   Nasuea/vomiting  Deconditioning    Plan:  ====================== NEUROLOGY=====================  Continue close monitoring of neuro status   C/w sertraline, prochlorperazine, and mirtazapine for depression   Fentanyl PRN for pain      fentaNYL    Injectable 12 MICROGram(s) IV Push every 3 hours PRN Moderate to severe pain  mirtazapine 7.5 milliGRAM(s) Oral daily  prochlorperazine   Tablet 10 milliGRAM(s) Oral three times a day  sertraline 100 milliGRAM(s) Oral daily    ==================== RESPIRATORY======================  Acute hypoxemic respiratory failure s/p #8 shiley trach on ; decannulated on ; Reintubated on ; trach size 6 DCT cuff on 10/4   Continue close monitoring of respiratory rate and breathing pattern, following of ABG’s with [place] monitoring, continuous pulse oximetry monitoring.   Tolerating TC since 10/19, continue as tolerated   Guaifenesin PO PRN mucous plugging     guaiFENesin Oral Liquid (Sugar-Free) 200 milliGRAM(s) Oral every 4 hours PRN mucous plugging    ====================CARDIOVASCULAR==================  Stage D Nonischemic Cardiomyopathy, Status Post HM2 on 2017; LVAD settings 9200, flow 5.7  TTE 10/4:  Severely decreased LV systolic function, Diffuse hypokinesis. Mild AR. No pericardial effusion   VT on 10/4, s/p Lidocaine drip, continue close tele monitoring   ASA/Plavix for recent SMA stent on hold due to drop in h/h    ===================HEMATOLOGIC/ONC ===================  CTA A/P on  +L RP hematoma   Acute blood loss anemia, monitor H&H/Plts   Plan to give PRBC, repeat h/h  LUE US on 10/20 with no pseudoaneurysm    ===================== RENAL =========================  Hx of CKD, continue monitoring urine output, I&OS, BUN/Cr   Replete lytes PRN. Keep K> 4 and Mg >2    ==================== GASTROINTESTINAL===================  Tolerating tube feeds, Vital AF @ 45cc/hr  CT A/P 10/5 neg for intra-abd abcess, severe stenosis of prox SMA and b/l iliac arteries  CTA A/P : Evaluation of the mesenteric vessels is limited by streak artifact from LVAD. There appears to be severe stenosis of the proximal SMA; abdominal mesenteric doppler is recommended for further evaluation. 2.  No small bowel findings to suggest acute mesenteric ischemia. 3.  Focal dissections involving the right and left common iliac arteries.  Stenosis of proximal SMA, s/p failed SMA stent c/b RP bleed on 9/10 - SMA stent with Vascular 10/18   Simethicone PRN for gas   Reglan for gut motility  Zofran for nausea     dextrose 5% + sodium chloride 0.45%. 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   CT C/A/P : progressive ISABELLE opacities and L consolidations, multifocal pneumonia  BCx 1/2 on 10/1 +Enterobacter cloacae complex, BCx  10/14+ Serratia marcescens   BCx 10/14 and SCX on 10/14 +Stenotrophomonas maltophilia, c/w Bactrim and vancomycin       trimethoprim / sulfamethoxazole IVPB 300 milliGRAM(s) IV Intermittent every 6 hours  vancomycin  IVPB 1000 milliGRAM(s) IV Intermittent every 12 hours      Patient requires continuous monitoring with bedside rhythm monitoring, pulse ox monitoring, and intermittent blood gas analysis. Care plan discussed with ICU care team. Patient remained critical and at risk for life threatening decompensation.     By signing my name below, I, Aparna Saleh, attest that this documentation has been prepared under the direction and in the presence of CLAUDIA Rossi   Electronically signed: Cesia Villegas, 10-24-21 @ 08:00    I, Maryann Cullen, personally performed the services described in this documentation. all medical record entries made by the scribe were at my direction and in my presence. I have reviewed the chart and agree that the record reflects my personal performance and is accurate and complete  Electronically signed: CLAUDIA Rossi        RICKY HAMPTON  MRN-[id number]  Patient is a 66y old  Male who presents with a chief complaint of Anemia, Supratherapeutic INR, Dark Stools (23 Oct 2021 12:45)    HPI:  65M PMH ACC/AHA stage D HF due to NICM HM2 LVAD , TV annuloplasty ring 17 as destination therapy due to severe peripheral artery disease with significant stenosis  SIADH, Depression, CKD-3 with hyperkalemia, past E. coli UTIs, driveline drainage (21) and COVID-19 (back in 2020)  He was recently seen in clinic where he complained of abdominal pain and dark stools w constipation back in May. He presents to [facility] ER today weakness and fatigue, moderate and + Black stools for three days, on coumadin secondary to warfarin use in the setting of an LVAD. Patient has required transfusions for GIB in the past. Mostly recently back in 2021 pt had anemia with dark stools. No interventions was done at that time. However Last Endoscopy was done in 2020 (negative). Today labs show patient is anemic with H/H of 4.5/16.3,. INR is 8.84 MAP in the 90s, Temp 35.1. He denies any chest pain, shortness of breath, dizziness, abd pain, nausea or vomiting.       (2021 16:57)      Surgery/Hospital Course:   admit for melena w/ anemia, INR 8.84   6/15 Capsul study (+) for small bowel bleed, balloon endoscopy (old blood in prox ileum); post EGD - septic w/ L opacity, re-intubated for concern for aspiration, TTE (Mod MR, decrease biV w/ interventricular septum boweing towards R)   bronch    +C Diff    CT C/A/P: Fluid filled colon which may be 2/2 rapid transit. Small bilateral pleffs with associates. Compressive atelectasis New ISABELLE & LLL  parenchymal opacities, suspicious for pneumonia. Moderate stenosis in the proximal superior mesenteric artery.    #8 Shiley trach at bedside    LVAD speed increased to 9200   Bronch   TC since . Patient transferred to SDU.    INR today 2.64.  H&H 7.3/24 this AM.  Will repeat CBC at noon, and will send stool guaiac Patient with persistent abdominal tenderness, rate of tube feeds decreased.  No nausea/vomiting.     INR today 2.4. H&H 9.1/28.6 low flow overnight /N&V, refusing Tube feeds on D5 1/2 normal  @50 cc/hr   INR 2.69  H&H 7.7/.1 refusing Tube feeds on D5 1/2 normal  @50 cc/hr. This am + BM Melena Dr Oneill HF  aware- PRBC x1  GI team consulted -  NPO  plan on study in am-  D/w Dr Cadet Patient  to return to CTU for further management; 1PRBCS    Post op INR 2.2 today.  No bleeding. BC + for SM.  Pt is hypotensive requiring pressor and inotropic support.  ID follow up today on Cefepime will follow.   R PTC for PTX    CT C/A/P: sub q emphysema in R chest wall, GGO RUL, small ascites CTH negative; Abd US: GB thickening, pericholecystic fluid     Perchole drain in place continues to drain total output overnight 133.  Fever today 38.8    duplex LE negative    Patient with persistent abdominal pain, refusing tube feeds and medications, Psych consulted   CTA A/P ordered to r/o mesenteric ischemia 2/2 persistent anorexia, nausea, vomiting. Revealed:  Evaluation of the mesenteric vessels is limited by streak artifact from LVAD. There appears to be severe stenosis of the proximal SMA; abdominal mesenteric doppler is recommended for further evaluation. 2.  No small bowel findings to suggest acute mesenteric ischemia. 3.  Focal dissections involving the right and left common iliac arteries.  8/15: Cultures resulted BC 1/2 +GPC in clusters, SC enterobacter; mesenteric duplex: borderline stenosis of proximal SMA  : CT C/A/P noncon: Nondular opacities in R lung apex w/cavitation, abd nl  :  Continue current care, treatment of thyrotoxicosis with medications as per endocrine, d/c ABX as per team.    RUQ sono: Contracted gallbladder with cholecystostomy tube in place.  9/10 failed SMA stent, c/b RP bleed s/p 3U PRCB   CTA Abd/Pelvis L RP hematoma    Trach decannulated    intubated fro resp distress for increased WOB, LL pneumonia; CT C/A/P: progressive ISABELLE opacities and L consolidations, multifocal pneumonia    TTE (EF 25%, decreased RV, mod MR)   10/3 VT -> Lidocaine gtt   10/4 Trach size 6 DCT cuff  10/5 CT abd (neg for intra-abd abcess, severe stenosis of prox SMA and b/l iliac arteries)  10/18 SMA Stent   10/23 RUE midline     Today:  - Continue to ambulate   - Patient is a candidate for stepdown     REVIEW OF SYSTEMS:  Speak over trach   Gen: No fever  EYES/ENT: No visual changes;  No vertigo or throat pain   NECK: No pain   RES:  No shortness of breath or Cough  CARD: No chest pain   GI: +intermittently c/o abd pain and nausea   : No dysuria  NEURO: No weakness  SKIN: No itching, rashes     ICU Vital Signs Last 24 Hrs  T(C): 35.8 (24 Oct 2021 04:00), Max: 36.6 (23 Oct 2021 16:00)  T(F): 96.4 (24 Oct 2021 04:00), Max: 97.9 (23 Oct 2021 16:00)  HR: 97 (24 Oct 2021 07:00) (88 - 123)  BP: --  BP(mean): --  ABP: 103/59 (24 Oct 2021 07:00) (76/57 - 148/145)  ABP(mean): 76 (24 Oct 2021 07:00) (65 - 146)  RR: 17 (24 Oct 2021 07:00) (12 - 39)  SpO2: 100% (24 Oct 2021 07:00) (95% - 100%)      Physical Exam:  Gen:  NAD  CNS: moves all extremities to command   Neck: no JVD, +trach, +central line   RES : coarse breath sounds, no wheezing    CVS: +LVAD hum   Abd: Soft. Sybil drain with bilious fluid. Positive BS throughout. Mild RUQ abdominal tenderness by perc sybil.  Skin: No rash, erythema, cyanosis.  Vasc: Warm and well-perfused.  Ext:  no edema    ============================I/O===========================   I&O's Detail    23 Oct 2021 07:01  -  24 Oct 2021 07:00  --------------------------------------------------------  IN:    dextrose 5% + sodium chloride 0.45%: 180 mL    Enteral Tube Flush: 120 mL    IV PiggyBack: 550 mL    IV PiggyBack: 1362.5 mL    Miscellaneous Tube Feedin mL    PRBCs (Packed Red Blood Cells): 300 mL    PRBCs (Packed Red Blood Cells): 300 mL    sodium chloride 0.9%: 360 mL  Total IN: 3192.5 mL    OUT:    Drain (mL): 160 mL    Emesis (mL): 2 mL    Voided (mL): 850 mL  Total OUT: 1012 mL    Total NET: 2180.5 mL        ============================ LABS =========================                        8.4    13.35 )-----------( 182      ( 24 Oct 2021 01:10 )             24.5     10-24    124<L>  |  90<L>  |  15  ----------------------------<  69<L>  4.2   |  22  |  0.54    Ca    8.9      24 Oct 2021 01:10  Phos  2.4     10-24  Mg     1.7     10-24    TPro  6.6  /  Alb  3.4  /  TBili  0.8  /  DBili  x   /  AST  13  /  ALT  11  /  AlkPhos  191<H>  10-24    LIVER FUNCTIONS - ( 24 Oct 2021 01:10 )  Alb: 3.4 g/dL / Pro: 6.6 g/dL / ALK PHOS: 191 U/L / ALT: 11 U/L / AST: 13 U/L / GGT: x             ABG - ( 24 Oct 2021 00:12 )  pH, Arterial: 7.41  pH, Blood: x     /  pCO2: 40    /  pO2: 129   / HCO3: 25    / Base Excess: 0.7   /  SaO2: 100.0               ======================Micro/Rad/Cardio=================  Culture: Reviewed   CXR: Reviewed  Echo:Reviewed  ======================================================  PAST MEDICAL & SURGICAL HISTORY:  CHF (congestive heart failure)    CAD (coronary artery disease)    Depression    Pleural effusion    History of  novel coronavirus disease (COVID-19)  2020    Hemorrhoids    Bleeding hemorrhoids    Peripheral arterial disease    Claudication    BPH with urinary obstruction    ACC/AHA stage D systolic heart failure    Anticoagulation goal of INR 2.0 to 2.5    Falls    Clavicle fracture    CKD (chronic kidney disease), stage III    Iron deficiency anemia    H/O epistaxis    Vertigo    GI bleed    S/P TVR (tricuspid valve repair)    S/P ventricular assist device    S/P endoscopy      ====================ASSESSMENT ==============  Stage D Nonischemic Cardiomyopathy, Status Post HM2 on 2017    Cardiogenic shock  Hemodynamic instability   Acute hypoxemic respiratory failure s/p trach , decannulated on ; Reintubated on    GI bleed , Status Post Enteroscopy   Anemia, in setting of melena   Chronic Kidney Disease  Stress hyperglycemia   C.diff positive on    Hypovolemic shock  Septic shock  Leukocytosis  GB thickening/percholecystic s/p perc sybil by IR   SMA stenosis s/p SMA Stent on 10/18   Serratia/citrobacter pneumonia   Stenotrophomonas pneumonia   Enterobacter pneumonia   Nasuea/vomiting  Deconditioning    Plan:  ====================== NEUROLOGY=====================  Continue close monitoring of neuro status   C/w sertraline, prochlorperazine, and mirtazapine for depression   Fentanyl PRN for pain      fentaNYL    Injectable 12 MICROGram(s) IV Push every 3 hours PRN Moderate to severe pain  mirtazapine 7.5 milliGRAM(s) Oral daily  prochlorperazine   Tablet 10 milliGRAM(s) Oral three times a day  sertraline 100 milliGRAM(s) Oral daily    ==================== RESPIRATORY======================  Acute hypoxemic respiratory failure s/p #8 shiley trach on ; decannulated on ; Reintubated on ; trach size 6 DCT cuff on 10/4   Continue close monitoring of respiratory rate and breathing pattern, following of ABG’s with [place] monitoring, continuous pulse oximetry monitoring.   Tolerating TC since 10/19, continue as tolerated   Guaifenesin PO PRN mucous plugging     guaiFENesin Oral Liquid (Sugar-Free) 200 milliGRAM(s) Oral every 4 hours PRN mucous plugging    ====================CARDIOVASCULAR==================  Stage D Nonischemic Cardiomyopathy, Status Post HM2 on 2017; LVAD settings 9200, flow 5.7  TTE 10/4:  Severely decreased LV systolic function, Diffuse hypokinesis. Mild AR. No pericardial effusion   VT on 10/4, s/p Lidocaine drip, continue close tele monitoring   ASA/Plavix for recent SMA stent on hold due to drop in h/h    ===================HEMATOLOGIC/ONC ===================  CTA A/P on  +L RP hematoma   Acute blood loss anemia, monitor H&H/Plts   Plan to give PRBC, repeat h/h  LUE US on 10/20 with no pseudoaneurysm    ===================== RENAL =========================  Hx of CKD, continue monitoring urine output, I&OS, BUN/Cr   Replete lytes PRN. Keep K> 4 and Mg >2    ==================== GASTROINTESTINAL===================  Tolerating tube feeds, Vital AF @ 45cc/hr  CT A/P 10/5 neg for intra-abd abcess, severe stenosis of prox SMA and b/l iliac arteries  CTA A/P : Evaluation of the mesenteric vessels is limited by streak artifact from LVAD. There appears to be severe stenosis of the proximal SMA; abdominal mesenteric doppler is recommended for further evaluation. 2.  No small bowel findings to suggest acute mesenteric ischemia. 3.  Focal dissections involving the right and left common iliac arteries.  Stenosis of proximal SMA, s/p failed SMA stent c/b RP bleed on 9/10 - SMA stent with Vascular 10/18   Simethicone PRN for gas   Reglan for gut motility  Zofran for nausea     dextrose 5% + sodium chloride 0.45%. 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   CT C/A/P : progressive ISABELLE opacities and L consolidations, multifocal pneumonia  BCx 1/2 on 10/1 +Enterobacter cloacae complex, BCx  10/14+ Serratia marcescens   BCx 10/14 and SCX on 10/14 +Stenotrophomonas maltophilia, c/w Bactrim and vancomycin       trimethoprim / sulfamethoxazole IVPB 300 milliGRAM(s) IV Intermittent every 6 hours  vancomycin  IVPB 1000 milliGRAM(s) IV Intermittent every 12 hours      Patient requires continuous monitoring with bedside rhythm monitoring, pulse ox monitoring, and intermittent blood gas analysis. Care plan discussed with ICU care team. Patient remained critical and at risk for life threatening decompensation.     By signing my name below, I, Aparna Saleh, attest that this documentation has been prepared under the direction and in the presence of CLAUDIA Rossi   Electronically signed: Cesia Villegas, 10-24-21 @ 08:00    I, Maryann Cullen, personally performed the services described in this documentation. all medical record entries made by the scribe were at my direction and in my presence. I have reviewed the chart and agree that the record reflects my personal performance and is accurate and complete  Electronically signed: CLAUDIA Rossi        RICKY HAMPTON  MRN-[id number]  Patient is a 66y old  Male who presents with a chief complaint of Anemia, Supratherapeutic INR, Dark Stools (23 Oct 2021 12:45)    HPI:  65M PMH ACC/AHA stage D HF due to NICM HM2 LVAD , TV annuloplasty ring 17 as destination therapy due to severe peripheral artery disease with significant stenosis  SIADH, Depression, CKD-3 with hyperkalemia, past E. coli UTIs, driveline drainage (21) and COVID-19 (back in 2020)  He was recently seen in clinic where he complained of abdominal pain and dark stools w constipation back in May. He presents to [facility] ER today weakness and fatigue, moderate and + Black stools for three days, on coumadin secondary to warfarin use in the setting of an LVAD. Patient has required transfusions for GIB in the past. Mostly recently back in 2021 pt had anemia with dark stools. No interventions was done at that time. However Last Endoscopy was done in 2020 (negative). Today labs show patient is anemic with H/H of 4.5/16.3,. INR is 8.84 MAP in the 90s, Temp 35.1. He denies any chest pain, shortness of breath, dizziness, abd pain, nausea or vomiting.       (2021 16:57)      Surgery/Hospital Course:   admit for melena w/ anemia, INR 8.84   6/15 Capsul study (+) for small bowel bleed, balloon endoscopy (old blood in prox ileum); post EGD - septic w/ L opacity, re-intubated for concern for aspiration, TTE (Mod MR, decrease biV w/ interventricular septum boweing towards R)   bronch    +C Diff    CT C/A/P: Fluid filled colon which may be 2/2 rapid transit. Small bilateral pleffs with associates. Compressive atelectasis New ISABELLE & LLL  parenchymal opacities, suspicious for pneumonia. Moderate stenosis in the proximal superior mesenteric artery.    #8 Shiley trach at bedside    LVAD speed increased to 9200   Bronch   TC since . Patient transferred to SDU.    INR today 2.64.  H&H 7.3/24 this AM.  Will repeat CBC at noon, and will send stool guaiac Patient with persistent abdominal tenderness, rate of tube feeds decreased.  No nausea/vomiting.     INR today 2.4. H&H 9.1/28.6 low flow overnight /N&V, refusing Tube feeds on D5 1/2 normal  @50 cc/hr   INR 2.69  H&H 7.7/.1 refusing Tube feeds on D5 1/2 normal  @50 cc/hr. This am + BM Melena Dr Oneill HF  aware- PRBC x1  GI team consulted -  NPO  plan on study in am-  D/w Dr Cadet Patient  to return to CTU for further management; 1PRBCS    Post op INR 2.2 today.  No bleeding. BC + for SM.  Pt is hypotensive requiring pressor and inotropic support.  ID follow up today on Cefepime will follow.   R PTC for PTX    CT C/A/P: sub q emphysema in R chest wall, GGO RUL, small ascites CTH negative; Abd US: GB thickening, pericholecystic fluid     Perchole drain in place continues to drain total output overnight 133.  Fever today 38.8    duplex LE negative    Patient with persistent abdominal pain, refusing tube feeds and medications, Psych consulted   CTA A/P ordered to r/o mesenteric ischemia 2/2 persistent anorexia, nausea, vomiting. Revealed:  Evaluation of the mesenteric vessels is limited by streak artifact from LVAD. There appears to be severe stenosis of the proximal SMA; abdominal mesenteric doppler is recommended for further evaluation. 2.  No small bowel findings to suggest acute mesenteric ischemia. 3.  Focal dissections involving the right and left common iliac arteries.  8/15: Cultures resulted BC 1/2 +GPC in clusters, SC enterobacter; mesenteric duplex: borderline stenosis of proximal SMA  : CT C/A/P noncon: Nondular opacities in R lung apex w/cavitation, abd nl  :  Continue current care, treatment of thyrotoxicosis with medications as per endocrine, d/c ABX as per team.    RUQ sono: Contracted gallbladder with cholecystostomy tube in place.  9/10 failed SMA stent, c/b RP bleed s/p 3U PRCB   CTA Abd/Pelvis L RP hematoma    Trach decannulated    intubated fro resp distress for increased WOB, LL pneumonia; CT C/A/P: progressive ISABELLE opacities and L consolidations, multifocal pneumonia    TTE (EF 25%, decreased RV, mod MR)   10/3 VT -> Lidocaine gtt   10/4 Trach size 6 DCT cuff  10/5 CT abd (neg for intra-abd abcess, severe stenosis of prox SMA and b/l iliac arteries)  10/18 SMA Stent   10/23 RUE midline     Today:  - Continue to ambulate   - Patient is a candidate for stepdown     REVIEW OF SYSTEMS:  Speak over trach   Gen: No fever  EYES/ENT: No visual changes;  No vertigo or throat pain   NECK: No pain   RES:  No shortness of breath or Cough  CARD: No chest pain   GI: +intermittently c/o abd pain and nausea   : No dysuria  NEURO: No weakness  SKIN: No itching, rashes     ICU Vital Signs Last 24 Hrs  T(C): 35.8 (24 Oct 2021 04:00), Max: 36.6 (23 Oct 2021 16:00)  T(F): 96.4 (24 Oct 2021 04:00), Max: 97.9 (23 Oct 2021 16:00)  HR: 97 (24 Oct 2021 07:00) (88 - 123)  BP: --  BP(mean): --  ABP: 103/59 (24 Oct 2021 07:00) (76/57 - 148/145)  ABP(mean): 76 (24 Oct 2021 07:00) (65 - 146)  RR: 17 (24 Oct 2021 07:00) (12 - 39)  SpO2: 100% (24 Oct 2021 07:00) (95% - 100%)      Physical Exam:  Gen:  NAD  CNS: moves all extremities to command   Neck: no JVD, +trach, +central line   RES : coarse breath sounds, no wheezing    CVS: +LVAD hum   Abd: Soft. Sybil drain with bilious fluid. Positive BS throughout. Mild RUQ abdominal tenderness by perc sybil.  Skin: No rash, erythema, cyanosis.  Vasc: Warm and well-perfused.  Ext:  no edema    ============================I/O===========================   I&O's Detail    23 Oct 2021 07:01  -  24 Oct 2021 07:00  --------------------------------------------------------  IN:    dextrose 5% + sodium chloride 0.45%: 180 mL    Enteral Tube Flush: 120 mL    IV PiggyBack: 550 mL    IV PiggyBack: 1362.5 mL    Miscellaneous Tube Feedin mL    PRBCs (Packed Red Blood Cells): 300 mL    PRBCs (Packed Red Blood Cells): 300 mL    sodium chloride 0.9%: 360 mL  Total IN: 3192.5 mL    OUT:    Drain (mL): 160 mL    Emesis (mL): 2 mL    Voided (mL): 850 mL  Total OUT: 1012 mL    Total NET: 2180.5 mL        ============================ LABS =========================                        8.4    13.35 )-----------( 182      ( 24 Oct 2021 01:10 )             24.5     10-24    124<L>  |  90<L>  |  15  ----------------------------<  69<L>  4.2   |  22  |  0.54    Ca    8.9      24 Oct 2021 01:10  Phos  2.4     10-24  Mg     1.7     10-24    TPro  6.6  /  Alb  3.4  /  TBili  0.8  /  DBili  x   /  AST  13  /  ALT  11  /  AlkPhos  191<H>  10-24    LIVER FUNCTIONS - ( 24 Oct 2021 01:10 )  Alb: 3.4 g/dL / Pro: 6.6 g/dL / ALK PHOS: 191 U/L / ALT: 11 U/L / AST: 13 U/L / GGT: x             ABG - ( 24 Oct 2021 00:12 )  pH, Arterial: 7.41  pH, Blood: x     /  pCO2: 40    /  pO2: 129   / HCO3: 25    / Base Excess: 0.7   /  SaO2: 100.0               ======================Micro/Rad/Cardio=================  Culture: Reviewed   CXR: Reviewed  Echo:Reviewed  ======================================================  PAST MEDICAL & SURGICAL HISTORY:  CHF (congestive heart failure)    CAD (coronary artery disease)    Depression    Pleural effusion    History of  novel coronavirus disease (COVID-19)  2020    Hemorrhoids    Bleeding hemorrhoids    Peripheral arterial disease    Claudication    BPH with urinary obstruction    ACC/AHA stage D systolic heart failure    Anticoagulation goal of INR 2.0 to 2.5    Falls    Clavicle fracture    CKD (chronic kidney disease), stage III    Iron deficiency anemia    H/O epistaxis    Vertigo    GI bleed    S/P TVR (tricuspid valve repair)    S/P ventricular assist device    S/P endoscopy      ====================ASSESSMENT ==============  Stage D Nonischemic Cardiomyopathy, Status Post HM2 on 2017    Cardiogenic shock  Hemodynamic instability   Acute hypoxemic respiratory failure s/p trach , decannulated on ; Reintubated on    GI bleed , Status Post Enteroscopy   Anemia, in setting of melena   Chronic Kidney Disease  Stress hyperglycemia   C.diff positive on    Hypovolemic shock  Septic shock  Leukocytosis  GB thickening/percholecystic s/p perc sybil by IR   SMA stenosis s/p SMA Stent on 10/18   Serratia/citrobacter pneumonia   Stenotrophomonas pneumonia   Enterobacter pneumonia   Nasuea/vomiting  Deconditioning    Plan:  ====================== NEUROLOGY=====================  Continue close monitoring of neuro status   C/w sertraline, prochlorperazine, and mirtazapine for depression   Fentanyl PRN for pain      fentaNYL    Injectable 12 MICROGram(s) IV Push every 3 hours PRN Moderate to severe pain  mirtazapine 7.5 milliGRAM(s) Oral daily  prochlorperazine   Tablet 10 milliGRAM(s) Oral three times a day  sertraline 100 milliGRAM(s) Oral daily    ==================== RESPIRATORY======================  Acute hypoxemic respiratory failure s/p #8 shiley trach on ; decannulated on ; Reintubated on ; trach size 6 DCT cuff on 10/4   Continue close monitoring of respiratory rate and breathing pattern, following of ABG’s with Fords monitoring, continuous pulse oximetry monitoring.   Tolerating TC since 10/19, continue as tolerated   Guaifenesin PO PRN mucous plugging     guaiFENesin Oral Liquid (Sugar-Free) 200 milliGRAM(s) Oral every 4 hours PRN mucous plugging    ====================CARDIOVASCULAR==================  Stage D Nonischemic Cardiomyopathy, Status Post HM2 on 2017; LVAD settings 9200, flow 5.7  TTE 10/4:  Severely decreased LV systolic function, Diffuse hypokinesis. Mild AR. No pericardial effusion   VT on 10/4, s/p Lidocaine drip, continue close tele monitoring   ASA/Plavix for recent SMA stent on hold due to drop in h/h and +occult feces    ===================HEMATOLOGIC/ONC ===================  CTA A/P on  +L RP hematoma   Acute blood loss anemia, monitor H&H/Plts   Plan to give PRBC, repeat h/h  LUE US on 10/20 with no pseudoaneurysm    ===================== RENAL =========================  Hx of CKD, continue monitoring urine output, I&OS, BUN/Cr   Replete lytes PRN. Keep K> 4 and Mg >2    ==================== GASTROINTESTINAL===================  Tolerating tube feeds, Vital AF @ 45cc/hr  CT A/P 10/5 neg for intra-abd abcess, severe stenosis of prox SMA and b/l iliac arteries  CTA A/P : Evaluation of the mesenteric vessels is limited by streak artifact from LVAD. There appears to be severe stenosis of the proximal SMA; abdominal mesenteric doppler is recommended for further evaluation. 2.  No small bowel findings to suggest acute mesenteric ischemia. 3.  Focal dissections involving the right and left common iliac arteries.  Stenosis of proximal SMA, s/p failed SMA stent c/b RP bleed on 9/10 - SMA stent with Vascular 10/18   Simethicone PRN for gas   Reglan for gut motility  Zofran for nausea     dextrose 5% + sodium chloride 0.45%. 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   CT C/A/P : progressive ISABELLE opacities and L consolidations, multifocal pneumonia  BCx 1/2 on 10/1 +Enterobacter cloacae complex, BCx  10/14+ Serratia marcescens   BCx 10/14 and SCX on 10/14 +Stenotrophomonas maltophilia, c/w Bactrim and vancomycin       trimethoprim / sulfamethoxazole IVPB 300 milliGRAM(s) IV Intermittent every 6 hours  vancomycin  IVPB 1000 milliGRAM(s) IV Intermittent every 12 hours      Patient requires continuous monitoring with bedside rhythm monitoring, pulse ox monitoring, and intermittent blood gas analysis. Care plan discussed with ICU care team. Patient remained critical and at risk for life threatening decompensation.     By signing my name below, I, Aparna Saleh, attest that this documentation has been prepared under the direction and in the presence of CLAUDIA Rossi   Electronically signed: Cesia Villegas, 10-24-21 @ 08:00    I, Maryann Cullen, personally performed the services described in this documentation. all medical record entries made by the luisibe were at my direction and in my presence. I have reviewed the chart and agree that the record reflects my personal performance and is accurate and complete  Electronically signed: CLAUDIA Rossi

## 2021-10-24 NOTE — PROGRESS NOTE ADULT - PROBLEM SELECTOR PLAN 8
- Has been transfused multiple times throughout this admission, last transfusion was on 10/23  -Antiplatelet currently held for bleeding. Favor at least 1 antiplatelet agent given fresh stent however defer to vascular - likely hypovolemic  - Started on NS+D5 @ 30 ml/h, consider increasing to 50 ml/h. Recheck Na in 6 h to adjust rate.

## 2021-10-24 NOTE — PROGRESS NOTE ADULT - PROBLEM SELECTOR PLAN 2
-Pt now at physiologic dosing of steroids so no need to test BS at this point.  -discussed with ELVIE TAYLOR.  Sally Beatty MD  Endocrinology Attending  Mondays and Tuesdays 9am-6pm: [phone] (pager)  Other days, night and weekend: [phone]

## 2021-10-24 NOTE — PROGRESS NOTE ADULT - PROBLEM SELECTOR PLAN 2
- Stable w/o evidence of LVAD malfunction, settings as above  - H/o recurrent GI bleeding  - Trend daily LDH, slowly downtrending    - now DNR - Stable w/o evidence of LVAD malfunction, settings as above  - H/o recurrent GI bleeding  - Trend daily LDH  - now DNR

## 2021-10-25 NOTE — PROGRESS NOTE ADULT - PROBLEM SELECTOR PLAN 2
- Stable w/o evidence of LVAD malfunction, settings as above  - H/o recurrent GI bleeding  - Trend daily LDH  - now DNR

## 2021-10-25 NOTE — PROGRESS NOTE ADULT - SUBJECTIVE AND OBJECTIVE BOX
Chief Complaint/Follow-up on:     Subjective:    MEDICATIONS  (STANDING):  chlorhexidine 2% Cloths 1 Application(s) Topical <User Schedule>  insulin lispro (ADMELOG) corrective regimen sliding scale   SubCutaneous every 6 hours  metoclopramide Injectable 10 milliGRAM(s) IV Push every 8 hours  mirtazapine 7.5 milliGRAM(s) Oral daily  multivitamin 1 Tablet(s) Oral daily  ondansetron Injectable 8 milliGRAM(s) IV Push every 8 hours  pantoprazole  Injectable 40 milliGRAM(s) IV Push every 12 hours  prochlorperazine   Tablet 10 milliGRAM(s) Oral three times a day  sertraline 100 milliGRAM(s) Oral daily  sodium chloride 0.9%. 1000 milliLiter(s) (10 mL/Hr) IV Continuous <Continuous>  thiamine 100 milliGRAM(s) Oral daily  trimethoprim / sulfamethoxazole IVPB 300 milliGRAM(s) IV Intermittent every 6 hours    MEDICATIONS  (PRN):  fentaNYL    Injectable 12 MICROGram(s) IV Push every 3 hours PRN Moderate to severe pain  guaiFENesin Oral Liquid (Sugar-Free) 200 milliGRAM(s) Oral every 4 hours PRN mucous plugging  simethicone 80 milliGRAM(s) Chew every 8 hours PRN Gas      PHYSICAL EXAM:  VITALS: T(C): 36 (10-25-21 @ 16:00)  T(F): 96.8 (10-25-21 @ 16:00), Max: 97.6 (10-25-21 @ 00:00)  HR: 101 (10-25-21 @ 16:00) (98 - 132)  BP: --  RR:  (12 - 37)  SpO2:  (92% - 100%)  Wt(kg): --  GENERAL: NAD, well-groomed, well-developed  EYES: No proptosis, no injection  HEENT:  Atraumatic, Normocephalic, moist mucous membranes  THYROID: Normal size, no palpable nodules  RESPIRATORY: Clear to auscultation bilaterally; No rales, rhonchi, wheezing, or rubs  CARDIOVASCULAR: Regular rate and rhythm; No murmurs; no peripheral edema  GI: Soft, nontender, non distended, normal bowel sounds  CUSHING'S SIGNS: no striae    POCT Blood Glucose.: 76 mg/dL (10-25-21 @ 15:56)  POCT Blood Glucose.: 82 mg/dL (10-25-21 @ 13:36)  POCT Blood Glucose.: 62 mg/dL (10-25-21 @ 12:44)  POCT Blood Glucose.: 62 mg/dL (10-25-21 @ 12:40)  POCT Blood Glucose.: 78 mg/dL (10-25-21 @ 05:04)  POCT Blood Glucose.: 93 mg/dL (10-25-21 @ 03:32)  POCT Blood Glucose.: 65 mg/dL (10-25-21 @ 02:38)  POCT Blood Glucose.: 69 mg/dL (10-25-21 @ 02:34)  POCT Blood Glucose.: 94 mg/dL (10-24-21 @ 17:36)  POCT Blood Glucose.: 92 mg/dL (10-24-21 @ 12:29)  POCT Blood Glucose.: 222 mg/dL (10-24-21 @ 06:49)  POCT Blood Glucose.: 82 mg/dL (10-24-21 @ 06:30)  POCT Blood Glucose.: 83 mg/dL (10-24-21 @ 05:35)  POCT Blood Glucose.: 128 mg/dL (10-24-21 @ 02:01)  POCT Blood Glucose.: 81 mg/dL (10-24-21 @ 01:33)  POCT Blood Glucose.: 110 mg/dL (10-23-21 @ 18:05)  POCT Blood Glucose.: 123 mg/dL (10-23-21 @ 12:41)  POCT Blood Glucose.: 139 mg/dL (10-23-21 @ 06:26)  POCT Blood Glucose.: 129 mg/dL (10-22-21 @ 23:44)  POCT Blood Glucose.: 154 mg/dL (10-22-21 @ 17:03)    10-25    128<L>  |  96  |  6<L>  ----------------------------<  67<L>  3.8   |  21<L>  |  0.49<L>    EGFR if : 133  EGFR if non : 115    Ca    8.5      10-25  Mg     1.6     10-25  Phos  2.3     10-25    TPro  6.3  /  Alb  3.2<L>  /  TBili  0.5  /  DBili  x   /  AST  12  /  ALT  11  /  AlkPhos  176<H>  10-25          Thyroid Function Tests:  10-25 @ 03:20 TSH -- FreeT4 1.2 T3 -- Anti TPO -- Anti Thyroglobulin Ab -- TSI --  10-18 @ 13:02 TSH -- FreeT4 2.5 T3 67 Anti TPO -- Anti Thyroglobulin Ab -- TSI --                           Chief Complaint/Follow-up on: hyperthyoidism    Subjective: Patient more vocal and smiling today. He denies any complaints. He told me that he lives in [place] and had been living alone. Repeat FT4 is down to 1.2 and he is tolerating the steroid taper. Tachycardia persists.     MEDICATIONS  (STANDING):  chlorhexidine 2% Cloths 1 Application(s) Topical <User Schedule>  insulin lispro (ADMELOG) corrective regimen sliding scale   SubCutaneous every 6 hours  metoclopramide Injectable 10 milliGRAM(s) IV Push every 8 hours  mirtazapine 7.5 milliGRAM(s) Oral daily  multivitamin 1 Tablet(s) Oral daily  ondansetron Injectable 8 milliGRAM(s) IV Push every 8 hours  pantoprazole  Injectable 40 milliGRAM(s) IV Push every 12 hours  prochlorperazine   Tablet 10 milliGRAM(s) Oral three times a day  sertraline 100 milliGRAM(s) Oral daily  sodium chloride 0.9%. 1000 milliLiter(s) (10 mL/Hr) IV Continuous <Continuous>  thiamine 100 milliGRAM(s) Oral daily  trimethoprim / sulfamethoxazole IVPB 300 milliGRAM(s) IV Intermittent every 6 hours    MEDICATIONS  (PRN):  fentaNYL    Injectable 12 MICROGram(s) IV Push every 3 hours PRN Moderate to severe pain  guaiFENesin Oral Liquid (Sugar-Free) 200 milliGRAM(s) Oral every 4 hours PRN mucous plugging  simethicone 80 milliGRAM(s) Chew every 8 hours PRN Gas      PHYSICAL EXAM:  VITALS: T(C): 36 (10-25-21 @ 16:00)  T(F): 96.8 (10-25-21 @ 16:00), Max: 97.6 (10-25-21 @ 00:00)  HR: 101 (10-25-21 @ 16:00) (98 - 132)  BP: --  RR:  (12 - 37)  SpO2:  (92% - 100%)  Wt(kg): --  GENERAL: NAD, well-groomed, well-developed  HEENT:  Atraumatic, Normocephalic, moist mucous membranes, +trach  RESPIRATORY: Clear to auscultation bilaterally; No rales, rhonchi, wheezing, or rubs  CARDIOVASCULAR: Regular rate and rhythm; No murmurs  GI: Soft, nontender, non distended, normal bowel sounds    POCT Blood Glucose.: 76 mg/dL (10-25-21 @ 15:56)  POCT Blood Glucose.: 82 mg/dL (10-25-21 @ 13:36)  POCT Blood Glucose.: 62 mg/dL (10-25-21 @ 12:44)  POCT Blood Glucose.: 62 mg/dL (10-25-21 @ 12:40)  POCT Blood Glucose.: 78 mg/dL (10-25-21 @ 05:04)  POCT Blood Glucose.: 93 mg/dL (10-25-21 @ 03:32)  POCT Blood Glucose.: 65 mg/dL (10-25-21 @ 02:38)  POCT Blood Glucose.: 69 mg/dL (10-25-21 @ 02:34)  POCT Blood Glucose.: 94 mg/dL (10-24-21 @ 17:36)  POCT Blood Glucose.: 92 mg/dL (10-24-21 @ 12:29)  POCT Blood Glucose.: 222 mg/dL (10-24-21 @ 06:49)  POCT Blood Glucose.: 82 mg/dL (10-24-21 @ 06:30)  POCT Blood Glucose.: 83 mg/dL (10-24-21 @ 05:35)  POCT Blood Glucose.: 128 mg/dL (10-24-21 @ 02:01)  POCT Blood Glucose.: 81 mg/dL (10-24-21 @ 01:33)  POCT Blood Glucose.: 110 mg/dL (10-23-21 @ 18:05)  POCT Blood Glucose.: 123 mg/dL (10-23-21 @ 12:41)  POCT Blood Glucose.: 139 mg/dL (10-23-21 @ 06:26)  POCT Blood Glucose.: 129 mg/dL (10-22-21 @ 23:44)  POCT Blood Glucose.: 154 mg/dL (10-22-21 @ 17:03)    10-25    128<L>  |  96  |  6<L>  ----------------------------<  67<L>  3.8   |  21<L>  |  0.49<L>    EGFR if : 133  EGFR if non : 115    Ca    8.5      10-25  Mg     1.6     10-25  Phos  2.3     10-25    TPro  6.3  /  Alb  3.2<L>  /  TBili  0.5  /  DBili  x   /  AST  12  /  ALT  11  /  AlkPhos  176<H>  10-25          Thyroid Function Tests:  10-25 @ 03:20  FreeT4 1.2   10-18 @ 13:02  FreeT4 2.5 T3 67

## 2021-10-25 NOTE — PROGRESS NOTE ADULT - SUBJECTIVE AND OBJECTIVE BOX
[facility] NUTRITION SUPPORT-- FOLLOW UP NOTE  -------------------------------------------------------------------------------    24 hour events/subjective: pt seen and examined. resting in bed. tf increased to 30cc. c/o abd pain and intermittent vomiting; denies current nausea. +gi fxn    Diet:  Diet, NPO with Tube Feed:   Tube Feeding Modality: Nasogastric  Vital AF (VITALAFRTH)  Total Volume for 24 Hours (mL): 1080  Continuous  Starting Tube Feed Rate mL per Hour: 10  Increase Tube Feed Rate by (mL): 10     Every 6 hours  Until Goal Tube Feed Rate (mL per Hour): 45  Tube Feed Duration (in Hours): 24  Tube Feed Start Time: 11:00 (10-20-21 @ 10:50)      PAST HISTORY  --------------------------------------------------------------------------------  No significant changes to PMH, PSH, FHx, SHx, unless otherwise noted    ALLERGIES & MEDICATIONS  --------------------------------------------------------------------------------  Allergies    Amiodarone Hydrochloride (Other (Severe))    Intolerances      Standing Inpatient Medications  chlorhexidine 2% Cloths 1 Application(s) Topical <User Schedule>  dextrose 5% + sodium chloride 0.9%. 1000 milliLiter(s) IV Continuous <Continuous>  insulin lispro (ADMELOG) corrective regimen sliding scale   SubCutaneous every 6 hours  methimazole 20 milliGRAM(s) Oral daily  metoclopramide Injectable 10 milliGRAM(s) IV Push every 8 hours  mirtazapine 7.5 milliGRAM(s) Oral daily  multivitamin 1 Tablet(s) Oral daily  ondansetron Injectable 8 milliGRAM(s) IV Push every 8 hours  pantoprazole  Injectable 40 milliGRAM(s) IV Push every 12 hours  potassium phosphate IVPB 15 milliMole(s) IV Intermittent once  predniSONE   Tablet 5 milliGRAM(s) Oral every 24 hours  prochlorperazine   Tablet 10 milliGRAM(s) Oral three times a day  sertraline 100 milliGRAM(s) Oral daily  thiamine 100 milliGRAM(s) Oral daily  trimethoprim / sulfamethoxazole IVPB 300 milliGRAM(s) IV Intermittent every 6 hours    PRN Inpatient Medications  fentaNYL    Injectable 12 MICROGram(s) IV Push every 3 hours PRN  guaiFENesin Oral Liquid (Sugar-Free) 200 milliGRAM(s) Oral every 4 hours PRN  simethicone 80 milliGRAM(s) Chew every 8 hours PRN        REVIEW OF SYSTEMS  --------------------------------------------------------------------------------  General:  as per hpi  HEENT:  no headaches, no vision changes, no ear pain, no epistaxis, no throat pain   CV:  no chest pain, no palpitations  Resp: as per hpi  GI:  as per hpi  :  no pain, no bleeding, no dysuria  Muscle:  no pain, no weakness  Neuro:  no numbness, no tingling  Psych:  no insomnia  Endocrine:  no cold/heat intolerance  Heme: no ecchymosis, no easy bruising  Skin:  no rash, no edema          VITALS/PHYSICAL EXAM  --------------------------------------------------------------------------------  T(C): 36.1 (10-25-21 @ 08:00), Max: 36.4 (10-25-21 @ 00:00)  HR: 106 (10-25-21 @ 08:00) (94 - 123)  BP: --  RR: 17 (10-25-21 @ 08:00) (12 - 37)  SpO2: 100% (10-25-21 @ 08:00) (87% - 100%)  Wt(kg): --      10-24-21 @ 07:01  -  10-25-21 @ 07:00  --------------------------------------------------------  IN: 2955 mL / OUT: 1240 mL / NET: 1715 mL    10-25-21 @ 07:01  -  10-25-21 @ 09:00  --------------------------------------------------------  IN: 330 mL / OUT: 175 mL / NET: 155 mL      I&O's Detail    24 Oct 2021 07:01  -  25 Oct 2021 07:00  --------------------------------------------------------  IN:    dextrose 5% + sodium chloride 0.45%: 30 mL    dextrose 5% + sodium chloride 0.9%: 930 mL    Enteral Tube Flush: 45 mL    IV PiggyBack: 1500 mL    IV PiggyBack: 50 mL    Miscellaneous Tube Feedin mL  Total IN: 2955 mL    OUT:    Drain (mL): 390 mL    Voided (mL): 850 mL  Total OUT: 1240 mL    Total NET: 1715 mL      25 Oct 2021 07:  -  25 Oct 2021 09:00  --------------------------------------------------------  IN:    dextrose 5% + sodium chloride 0.9%: 50 mL    IV PiggyBack: 250 mL    Miscellaneous Tube Feedin mL  Total IN: 330 mL    OUT:    Drain (mL): 125 mL    Voided (mL): 50 mL  Total OUT: 175 mL    Total NET: 155 mL      Physical Exam:  Gen: lying in bed +ngt  HEENT: +trach  Chest: respirations non labored  Abd: soft, nondistended, + perc c tube  LE:  no edema  Neuro: awake alert responsive      LABS/STUDIES  --------------------------------------------------------------------------------              8.0    8.68  >-----------<  195      [10-25-21 @ 00:44]              24.3     128  |  96  |  6   ----------------------------<  67      [10-25-21 @ 00:44]  3.8   |  21  |  0.49        Ca     8.5     [10-25-21 @ 00:44]      Mg     1.6     [10-25-21 @ 00:44]      Phos  2.3     [10-25-21 @ 00:44]    TPro  6.3  /  Alb  3.2  /  TBili  0.5  /  DBili  x   /  AST  12  /  ALT  11  /  AlkPhos  176  [10-25-21 @ 00:44]              [10-25-21 @ 00:44]  Serum Osmolality 263      [10-24-21 @ 22:39]    Ca ionizedBlood Gas Arterial - Calcium, Ionized: 1.32 mmol/L (10-25-21 @ 00:31)  Blood Gas Arterial - Calcium, Ionized: 1.32 mmol/L (10-24-21 @ 00:12)    Creatinine Trend:  POC glucoseGlucose, Serum: 67 mg/dL (10-25-21 @ 00:44)  CAPILLARY BLOOD GLUCOSE      POCT Blood Glucose.: 78 mg/dL (25 Oct 2021 05:04)  POCT Blood Glucose.: 93 mg/dL (25 Oct 2021 03:32)  POCT Blood Glucose.: 65 mg/dL (25 Oct 2021 02:38)  POCT Blood Glucose.: 69 mg/dL (25 Oct 2021 02:34)  POCT Blood Glucose.: 94 mg/dL (24 Oct 2021 17:36)  POCT Blood Glucose.: 92 mg/dL (24 Oct 2021 12:29)    Prealbumin  Triglycerides

## 2021-10-25 NOTE — PROGRESS NOTE ADULT - ASSESSMENT
65 year old male with history of Stage D HF due to NICM on LVAD, TV annuloplasty ring 9/12/17 as destination therapy due to severe peripheral artery disease with significant stenosis  SIADH, Depression, CKD-3 with hyperkalemia, admitted 6/14/21 with symptomatic anemia with Hgb 4.5 in setting of INR 8.8, thereafter with complicated hospital course including VAP, serratia bacteremia with acalculous cholecystitis s/p percutaneous tube, abdominal pain s/p attempted SMA stent on 9/10/21, RP bleed, now reintubated in setting of aspiration PNA.   Endocrine consulted for management of hyperthyroidism in the setting of recent amiodarone use and multiple IV contrast studies (7/28, 8/13, 9/12, 9/26, 10/5) and steroid induced hyperglycemia on tube feeds, now resolved.

## 2021-10-25 NOTE — PROGRESS NOTE ADULT - SUBJECTIVE AND OBJECTIVE BOX
RICKY JOINT  MRN#: [id number]  Subjective:  Pulmonary progress  : recurrent Acute hypoxic respiratory Failure ,aspiration pneumonia, NICM  ,   65M PMH ACC/AHA stage D HF due to NICM HM2 LVAD , TV annuloplasty ring 17 as destination therapy due to severe peripheral artery disease with significant stenosis  SIADH, Depression, CKD-3 with hyperkalemia, past E. coli UTIs, driveline drainage (21) and COVID-19 (back in 2020)  He was recently seen in clinic where he complained of abdominal pain and dark stools w constipation back in May. He presents to [facility] ER today weakness and fatigue, moderate and + Black stools for three days, on coumadin secondary to warfarin use in the setting of an LVAD. Patient has required transfusions for GIB in the past. Mostly recently back in 2021 pt had anemia with dark stools. No interventions was done at that time. However Last Endoscopy was done in 2020 (negative). Today labs show patient is anemic with H/H of 4.5/16.3,. INR is 8.84 MAP in the 90s, Temp 35.1. He denies any chest pain, shortness of breath, dizziness, abd pain, nausea or vomiting. found to have  rectal bleeding underwent endoscopy ,old blood in the proximal ileum ,  develop sepsis with LL opacity given Antibiotics , Extubated , reintubated , Bronchoscopy on Zosyn for LL pneumonia  and Amiodarone S/P TV Annuloplasty , patient remain intubated on full ventilatory support .S/P multiple units of blood transfusion , remain on full ventilatory support on Precedex and propofol , new central IJ line , diarrhea C diff. +ve on po vancomycin and IV Flagyl,  mildly distended belly , fever start on cefepime 2gm q 8 hrs S/P tracheostomy .  new RT Subclavian central line continue on contact  isolation ,S/P  C-diff antibiotics, no more diarrhea back on full support mechanical ventilator , chest x ray show improvement in LLL air space disease, more awake and responsive on tube feeding no more diarrhea ,  no nausea or vomiting or diarrhea still very weak and tired , back on tube feeding ,still on po vancomycin , getting PT and OT at bed side ,   , no SOB getting stronger , improve muscle tone patient transfer to monitor bed still on contact isolation for C-Difficel colitis on 50% FI02, and change to Suresh  tube feeding still loose stool . H/H drop significantly require blood transfusion , most likely GI bleeding , IV heparin D/C ,  H/H is stable ., patient develop TR sided  pneumothorax require chest tube placement , RT IJ central line  placed , develop fever shaking chills , blood culture positive for serratia marcescens , start on cefepime .the patient  become hypoxic and hypotensive placed on full ventilatory support and Vasopressin , levophed and Dobutamine ,S/P blood transfusion on meropenem and vancomycin ,   , on and  off pressors , occasional agitation on Precedex .S/P IR cholecystostomy tube drainage placement in the RT upper Quadrant , resume anticoagulation chest x ray noted C-PAP trail lasted only for 2 hrs , new RT SC line and D/C RT IJ line , RT pig tail cathter has been removed , tolerating C-PAP trial placed on trach. collar 50% FI02 GI consultant noted on NG tube feeding as tolerated , develop AF RVR S/P  bolus Amiodarone  back to regular sinus Rhythm , Flat Affect depressed , back on tube feeding Vital AF at 60 cc/ hr .still intermittent abdominal pain , no fever saturation is accepted  back on full ventilatory support ,   on methimazole for hyperthyroidism ,  condition is the same ,still C/O Abdominal pain , white count is improving , no chest pain or SOB ,  .placed on Reglan 10 mg TID for gastric motility , depressed , withdrawn. , S/P  mesenteric angiogram , unable to stent SMA S/P 3 units of blood transfusion   RT IJ in place reassessed for stent in the SMA by vascular,  dark stool stable H/H ,surgical opinion for possible Lap cholecystectomy. over night events noted develop respiratory distress, , rectal bleeding, require intubation placed on full ventilatory support , FI02 is 50% also became hemianosmically unstable require triple pressor support with levophed , vasopressin and norsynephrine, pan culture placed  on Vancomycin IV and PO  and Zosyn, sedated with propofol kept NPO , new central line RT and left IJ cathter placed . Diflucan Added .fever of 38.5 weaned off  vasopressin ,   fever adding IV vancomycin and  vancomycin solution  , and Bactrim , S/P  tracheostomy , bleeding receiving blood transfusion on vasopressin , no more bleeding , no fever , more awake and responsive ,tolerating tube feeding , ID and heart failure follow up appreciated , depresses no weaning for now , magnesium is low to give supplement too keep Above 2 , patient S/P  vascular procedure for superior mesenteric artery occlusion S/P 2 stent placement , patient tolerate it very well  , left upper extremities hematoma .vascular follow up appreciated , increase WBC , new RT UPE midline , black tarry stool , very loose R/O recurrent GI bleeding and C-dificile  colitis stable H/H      (2021 16:57)    PAST MEDICAL & SURGICAL HISTORY:  CHF (congestive heart failure)    CAD (coronary artery disease)  Depression    Pleural effusion    History of 2019 novel coronavirus disease (COVID-19)  2020    Hemorrhoids    Bleeding hemorrhoids    Peripheral arterial disease    Claudication    BPH with urinary obstruction    ACC/AHA stage D systolic heart failure  Anticoagulation goal of INR 2.0 to 2.5    Falls    Clavicle fracture    CKD (chronic kidney disease), stage III    Iron deficiency anemia    H/O epistaxis    Vertigo    GI bleed    S/P TVR (tricuspid valve repair)    S/P ventricular assist device    S/P endoscopy    OBJECTIVE:  ICU Vital Signs Last 24 Hrs  T(C): 38.2 (2021 10:00), Max: 38.5 (2021 12:00)  T(F): 100.8 (2021 10:00), Max: 101.3 (2021 12:00)  HR: 65 (2021 10:00) (61 - 69)  BP: --  BP(mean): --  ABP: 105/67 (2021 10:00) (90/54 - 113/64)  ABP(mean): 77 (2021 10:00) (63 - 77)  RR: 20 (2021 10:00) (19 - 35)  SpO2: 99% (2021 10:00) (96% - 100%)       @ 07:  -   @ 07:00  --------------------------------------------------------  IN: 2693.9 mL / OUT: 1415 mL / NET: 1278.9 mL     @ 07:01  -   @ 10:49  --------------------------------------------------------  IN: 420.8 mL / OUT: 115 mL / NET: 305.8 mL  PHYSICAL EXAM: Daily   Elderly male  on trach. collar  FI02 is 40% S/P tracheostomy   Daily Weight in k.4 (2021 00:00)  HEENT:     + NCAT  + EOMI  - Conjuctival edema   - Icterus   - Thrush   - ETT  + NGT/OGT  Neck:         + FROM  + JVD  - Nodes - Masses + Mid-line trachea + Tracheostomy  Chest:            normal A-P diameter    Lungs:          + CTA   + Rhonchi    - Rales    - Wheezing + Decreased  LT BS   - Dullness R L  Cardiac:       + S1 + S2    + RRR   - Irregular   - S3  - S4    - Murmurs   - Rub   - Hamman’s sign   Abdomen:    + BS  + Soft + Non-tender - Distended - Organomegaly - PEG .cholecystostomy tube in place  Extremities:   - Cyanosis U/L   - Clubbing  U/L  + LE/UE Edema LUE hematoma   + Capillary refill    + Pulses   Neuro:        - Awake   -  Alert   - Confused   - Lethargic   + Sedated  + Generalized Weakness  Skin:        - Rashes    - Erythema   + Normal incisions   + IV sites intact          HOSPITAL MEDICATIONS: All medications reviewed and analyzed  MEDICATIONS  (STANDING):  amiodarone    Tablet 200 milliGRAM(s) Oral daily  chlorhexidine 0.12% Liquid 15 milliLiter(s) Oral Mucosa every 12 hours  chlorhexidine 2% Cloths 1 Application(s) Topical <User Schedule>  dexmedetomidine Infusion 0.5 MICROgram(s)/kG/Hr (9.81 mL/Hr) IV Continuous <Continuous>  dextrose 50% Injectable 50 milliLiter(s) IV Push every 15 minutes  heparin  Infusion 400 Unit(s)/Hr (12.5 mL/Hr) IV Continuous <Continuous>  Hydromorphone  Injectable 0.5 milliGRAM(s) IV Push once  insulin lispro (ADMELOG) corrective regimen sliding scale   SubCutaneous every 6 hours  pantoprazole  Injectable 40 milliGRAM(s) IV Push every 12 hours  piperacillin/tazobactam IVPB.. 3.375 Gram(s) IV Intermittent every 8 hours  propofol Infusion 20 MICROgram(s)/kG/Min (9.42 mL/Hr) IV Continuous <Continuous>  sodium chloride 0.9% lock flush 3 milliLiter(s) IV Push every 8 hours  sodium chloride 0.9%. 1000 milliLiter(s) (10 mL/Hr) IV Continuous <Continuous>    MEDICATIONS  (PRN):  acetaminophen    Suspension .. 650 milliGRAM(s) Oral every 6 hours PRN Temp greater or equal to 38C (100.4F)    LABS: All Lab data reviewed and analyzed                        8.0    8.68  )-----------( 195      ( 25 Oct 2021 00:44 )             24.3   10-25    128<L>  |  96  |  6<L>  ----------------------------<  67<L>  3.8   |  21<L>  |  0.49<L>    Ca    8.5      25 Oct 2021 00:44  Phos  2.3     10-25  Mg     1.6     10-    TPro  6.3  /  Alb  3.2<L>  /  TBili  0.5  /  DBili  x   /  AST  12  /  ALT  11  /  AlkPhos  176<H>  10-    Ca    8.9      24 Oct 2021 01:10  Phos  2.4     10-24  Mg     1.7     10-    TPro  6.6  /  Alb  3.4  /  TBili  0.8  /  DBili  x   /  AST  13  /  ALT  11  /  AlkPhos  191<H>  10-24    Ca    9.2      23 Oct 2021 01:08  Phos  3.4     10-23  Mg     1.9     10-    TPro  7.2  /  Alb  3.6  /  TBili  0.8  /  DBili  x   /  AST  10  /  ALT  13  /  AlkPhos  219<H>  10-    Ca    9.2      22 Oct 2021 01:08  Phos  2.7     10-22  Mg     1.8     10-    TPro  7.5  /  Alb  3.8  /  TBili  0.7  /  DBili  x   /  AST  15  /  ALT  18  /  AlkPhos  206<H>  10-    Ca    9.5      21 Oct 2021 00:33  Phos  2.8     10-21  Mg     1.7     10-    TPro  7.8  /  Alb  4.0  /  TBili  0.8  /  DBili  x   /  AST  17  /  ALT  20  /  AlkPhos  192<H>  10-21                                                                                                                                                                            PTT - ( 2021 04:52 )  PTT:45.2 sec LIVER FUNCTIONS - ( 2021 00:42 )  Alb: 3.4 g/dL / Pro: 6.7 g/dL / ALK PHOS: 213 U/L / ALT: 15 U/L / AST: 24 U/L / GGT: x           RADIOLOGY: - Reviewed and analyzed RT Pig tail cathter  , LVAD HM2, CT scan of abdomen reviewed result noted

## 2021-10-25 NOTE — PROGRESS NOTE ADULT - ASSESSMENT
67 YO M with a history of stage D NICM s/p HM2 on 9/2017 as DT (due to severe PAD) with TV ring, prior COVID-19 infection 4/2020, recurrent syncope post LVAD s/p ILR, and chronic abdominal pain with prior negative workup who was admitted 6/14/21 with symptomatic anemia with Hgb 4.5 in setting of INR 8.8 without hemodynamic instability and has since had a protracted hospitalization. He was transfused and underwent VCE which showed active bleeding in the mid small bowel but subsequent enteroscopy 6/15 did not reveal any active bleeding. He acutely decompensated after procedure with fever/hypertension, low flow alarms, and pulmonary infiltrate with hypoxia requiring intubation from probable aspiration PNA. He was unable to extubated and has since undergone tracheostomy but tolerating persistent trach collar and nearing ability for decannulation. 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 He underwent angiogram on 9/10, stent was unable to be placed and course was then complicated by RP bleed. He continued to have persistent leukocytosis and febrile episodes and was noted to have positive sputum culture for serratia marcescens and completed his course of abx. He was initially decannulated on 9/23. Recently he started having multiples of melena, emesis and went into acte respiratory distress and was ultimately re-intubated on 9/26.     He had blood cultures are positive for enterobacter cloacae/serratia, remains on IV antibiotics. He is s/p trach with ENT on 10/4. Sputum cultures positive for stenotrophomonas and blood cultures positive for serratia on bactrim. S/p SMA stent x 2 10/18.   Early evidence of clearing of serratia bacteremia    Morris Prater MD  [phone]  After 5pm/weekends [phone]   67 YO M with a history of stage D NICM s/p HM2 on 9/2017 as DT (due to severe PAD) with TV ring, prior COVID-19 infection 4/2020, recurrent syncope post LVAD s/p ILR, and chronic abdominal pain with prior negative workup who was admitted 6/14/21 with symptomatic anemia with Hgb 4.5 in setting of INR 8.8 without hemodynamic instability and has since had a protracted hospitalization. He was transfused and underwent VCE which showed active bleeding in the mid small bowel but subsequent enteroscopy 6/15 did not reveal any active bleeding. He acutely decompensated after procedure with fever/hypertension, low flow alarms, and pulmonary infiltrate with hypoxia requiring intubation from probable aspiration PNA. He was unable to extubated and has since undergone tracheostomy but tolerating persistent trach collar and nearing ability for decannulation. 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 He underwent angiogram on 9/10, stent was unable to be placed and course was then complicated by RP bleed. He continued to have persistent leukocytosis and febrile episodes and was noted to have positive sputum culture for serratia marcescens and completed his course of abx. He was initially decannulated on 9/23. Recently he started having multiples of melena, emesis and went into acte respiratory distress and was ultimately re-intubated on 9/26.     He had blood cultures are positive for enterobacter cloacae/serratia, remains on IV antibiotics. He is s/p trach with ENT on 10/4. Sputum cultures positive for stenotrophomonas and blood cultures positive for serratia on bactrim. S/p SMA stent x 2 10/18.   Early evidence of clearing of serratia bacteremia  completing 10 days of IV bactrim and would discotininue in the setting of low sodium from fluid requirements with bactrim    Morris Prater MD  [phone]  After 5pm/weekends [phone]

## 2021-10-25 NOTE — PROGRESS NOTE ADULT - PROBLEM SELECTOR PLAN 8
- likely hypovolemic  - Currently on  NS+D5 @ 30 ml/h  - Please consult nephrology for further recommendations. May consider started FeUrea

## 2021-10-25 NOTE — PROGRESS NOTE ADULT - SUBJECTIVE AND OBJECTIVE BOX
INFECTIOUS DISEASES FOLLOW UP-- Anitra Prater  [phone]    This is a follow up note for this  66yMale with  Anemia    Acute cholecystitis        ROS:  CONSTITUTIONAL:  No fever, good appetite  CARDIOVASCULAR:  No chest pain or palpitations  RESPIRATORY:  No dyspnea  GASTROINTESTINAL:  No nausea, vomiting, diarrhea, or abdominal pain  GENITOURINARY:  No dysuria  NEUROLOGIC:  No headache,     Allergies    Amiodarone Hydrochloride (Other (Severe))    Intolerances        ANTIBIOTICS/RELEVANT:  antimicrobials  trimethoprim / sulfamethoxazole IVPB 300 milliGRAM(s) IV Intermittent every 6 hours    immunologic:    OTHER:  chlorhexidine 2% Cloths 1 Application(s) Topical <User Schedule>  fentaNYL    Injectable 12 MICROGram(s) IV Push every 3 hours PRN  guaiFENesin Oral Liquid (Sugar-Free) 200 milliGRAM(s) Oral every 4 hours PRN  insulin lispro (ADMELOG) corrective regimen sliding scale   SubCutaneous every 6 hours  metoclopramide Injectable 10 milliGRAM(s) IV Push every 8 hours  mirtazapine 7.5 milliGRAM(s) Oral daily  multivitamin 1 Tablet(s) Oral daily  ondansetron Injectable 8 milliGRAM(s) IV Push every 8 hours  pantoprazole  Injectable 40 milliGRAM(s) IV Push every 12 hours  prochlorperazine   Tablet 10 milliGRAM(s) Oral three times a day  sertraline 100 milliGRAM(s) Oral daily  simethicone 80 milliGRAM(s) Chew every 8 hours PRN  sodium chloride 0.9%. 1000 milliLiter(s) IV Continuous <Continuous>  thiamine 100 milliGRAM(s) Oral daily      Objective:  Vital Signs Last 24 Hrs  T(C): 36 (25 Oct 2021 16:00), Max: 36.4 (25 Oct 2021 00:00)  T(F): 96.8 (25 Oct 2021 16:00), Max: 97.6 (25 Oct 2021 00:00)  HR: 103 (25 Oct 2021 19:00) (98 - 132)  BP: --  BP(mean): --  RR: 17 (25 Oct 2021 19:00) (12 - 37)  SpO2: 96% (25 Oct 2021 19:00) (92% - 100%)    PHYSICAL EXAM:  Constitutional:no acute distress  Eyes:ALONSO, EOMI  Ear/Nose/Throat: no oral lesions, 	  Respiratory: clear BL  Cardiovascular: S1S2  Gastrointestinal:soft, (+) BS, no tenderness  Extremities:no e/e/c  No Lymphadenopathy  IV sites not inflammed.    LABS:                        8.2    14.18 )-----------( 199      ( 25 Oct 2021 16:59 )             25.4     10-25    129<L>  |  94<L>  |  5<L>  ----------------------------<  76  3.7   |  20<L>  |  0.50    Ca    9.1      25 Oct 2021 16:59  Phos  2.3     10-25  Mg     1.6     10-25    TPro  6.7  /  Alb  3.4  /  TBili  0.5  /  DBili  x   /  AST  11  /  ALT  11  /  AlkPhos  190<H>  10-25          MICROBIOLOGY:            RECENT CULTURES:  10-23 @ 16:27  .Blood Blood  --  --  --    No growth to date.  --  10-22 @ 22:01  .Blood Blood  --  --  --    No growth to date.  --      RADIOLOGY & ADDITIONAL STUDIES:  < from: Xray Chest 1 View- PORTABLE-Routine (Xray Chest 1 View- PORTABLE-Routine in AM.) (10.24.21 @ 02:07) >  IMPRESSION:  Tubes and lines as described above.  Visualized portions of the lungs are clear.    < end of copied text >   INFECTIOUS DISEASES FOLLOW UP-- Anitra Prater  [phone]    This is a follow up note for this  66yMale with  Anemia    Acute cholecystitis        ROS:  CONSTITUTIONAL: frail, cachectic    Allergies    Amiodarone Hydrochloride (Other (Severe))    Intolerances        ANTIBIOTICS/RELEVANT:  antimicrobials  trimethoprim / sulfamethoxazole IVPB 300 milliGRAM(s) IV Intermittent every 6 hours    immunologic:    OTHER:  chlorhexidine 2% Cloths 1 Application(s) Topical <User Schedule>  fentaNYL    Injectable 12 MICROGram(s) IV Push every 3 hours PRN  guaiFENesin Oral Liquid (Sugar-Free) 200 milliGRAM(s) Oral every 4 hours PRN  insulin lispro (ADMELOG) corrective regimen sliding scale   SubCutaneous every 6 hours  metoclopramide Injectable 10 milliGRAM(s) IV Push every 8 hours  mirtazapine 7.5 milliGRAM(s) Oral daily  multivitamin 1 Tablet(s) Oral daily  ondansetron Injectable 8 milliGRAM(s) IV Push every 8 hours  pantoprazole  Injectable 40 milliGRAM(s) IV Push every 12 hours  prochlorperazine   Tablet 10 milliGRAM(s) Oral three times a day  sertraline 100 milliGRAM(s) Oral daily  simethicone 80 milliGRAM(s) Chew every 8 hours PRN  sodium chloride 0.9%. 1000 milliLiter(s) IV Continuous <Continuous>  thiamine 100 milliGRAM(s) Oral daily      Objective:  Vital Signs Last 24 Hrs  T(C): 36 (25 Oct 2021 16:00), Max: 36.4 (25 Oct 2021 00:00)  T(F): 96.8 (25 Oct 2021 16:00), Max: 97.6 (25 Oct 2021 00:00)  HR: 103 (25 Oct 2021 19:00) (98 - 132)  BP: --  BP(mean): --  RR: 17 (25 Oct 2021 19:00) (12 - 37)  SpO2: 96% (25 Oct 2021 19:00) (92% - 100%)    PHYSICAL EXAM:  Constitutional:no acute distress  Eyes:ALONSO, EOMI  Ear/Nose/Throat: no oral lesions, 	  Respiratory: clear BL  Cardiovascular: [id number] sounds  Gastrointestinal: episodes of vomiting, cholecystotomy tube  Extremities:no e/e/c  No Lymphadenopathy  IV sites not inflammed.    LABS:                        8.2    14.18 )-----------( 199      ( 25 Oct 2021 16:59 )             25.4     10-25    129<L>  |  94<L>  |  5<L>  ----------------------------<  76  3.7   |  20<L>  |  0.50    Ca    9.1      25 Oct 2021 16:59  Phos  2.3     10-25  Mg     1.6     10-25    TPro  6.7  /  Alb  3.4  /  TBili  0.5  /  DBili  x   /  AST  11  /  ALT  11  /  AlkPhos  190<H>  10-25          MICROBIOLOGY:            RECENT CULTURES:  10-23 @ 16:27  .Blood Blood  --  --  --    No growth to date.  --  10-22 @ 22:01  .Blood Blood  --  --  --    No growth to date.  --      RADIOLOGY & ADDITIONAL STUDIES:  < from: Xray Chest 1 View- PORTABLE-Routine (Xray Chest 1 View- PORTABLE-Routine in AM.) (10.24.21 @ 02:07) >  IMPRESSION:  Tubes and lines as described above.  Visualized portions of the lungs are clear.    < end of copied text >

## 2021-10-25 NOTE — PROGRESS NOTE ADULT - SUBJECTIVE AND OBJECTIVE BOX
RICKY HAMPTON  MRN-[id number]  Patient is a 66y old  Male who presents with a chief complaint of Anemia, Supratherapeutic INR, Dark Stools (25 Oct 2021 10:42)    HPI:  65M PMH ACC/AHA stage D HF due to NICM HM2 LVAD , TV annuloplasty ring 17 as destination therapy due to severe peripheral artery disease with significant stenosis  SIADH, Depression, CKD-3 with hyperkalemia, past E. coli UTIs, driveline drainage (21) and COVID-19 (back in 2020)  He was recently seen in clinic where he complained of abdominal pain and dark stools w constipation back in May. He presents to [facility] ER today weakness and fatigue, moderate and + Black stools for three days, on coumadin secondary to warfarin use in the setting of an LVAD. Patient has required transfusions for GIB in the past. Mostly recently back in 2021 pt had anemia with dark stools. No interventions was done at that time. However Last Endoscopy was done in 2020 (negative). Today labs show patient is anemic with H/H of 4.5/16.3,. INR is 8.84 MAP in the 90s, Temp 35.1. He denies any chest pain, shortness of breath, dizziness, abd pain, nausea or vomiting.       (2021 16:57)      Surgery/Hospital Course:   admit for melena w/ anemia, INR 8.84   6/15 Capsul study (+) for small bowel bleed, balloon endoscopy (old blood in prox ileum); post EGD - septic w/ L opacity, re-intubated for concern for aspiration, TTE (Mod MR, decrease biV w/ interventricular septum boweing towards R)   bronch    +C Diff    CT C/A/P: Fluid filled colon which may be 2/2 rapid transit. Small bilateral pleffs with associates. Compressive atelectasis New ISABELLE & LLL  parenchymal opacities, suspicious for pneumonia. Moderate stenosis in the proximal superior mesenteric artery.    #8 Shiley trach at bedside    LVAD speed increased to 9200   Bronch   TC since . Patient transferred to SDU.    INR today 2.64.  H&H 7.3/24 this AM.  Will repeat CBC at noon, and will send stool guaiac Patient with persistent abdominal tenderness, rate of tube feeds decreased.  No nausea/vomiting.     INR today 2.4. H&H 9.1/28.6 low flow overnight /N&V, refusing Tube feeds on D5 1/2 normal  @50 cc/hr   INR 2.69  H&H 7.7/.1 refusing Tube feeds on D5 1/2 normal  @50 cc/hr. This am + BM Melena Dr Oneill HF  aware- PRBC x1  GI team consulted -  NPO  plan on study in am-  D/w Dr Cadet Patient  to return to CTU for further management; 1PRBCS    Post op INR 2.2 today.  No bleeding. BC + for SM.  Pt is hypotensive requiring pressor and inotropic support.  ID follow up today on Cefepime will follow.   R PTC for PTX    CT C/A/P: sub q emphysema in R chest wall, GGO RUL, small ascites CTH negative; Abd US: GB thickening, pericholecystic fluid     Perchole drain in place continues to drain total output overnight 133.  Fever today 38.8    duplex LE negative    Patient with persistent abdominal pain, refusing tube feeds and medications, Psych consulted   CTA A/P ordered to r/o mesenteric ischemia 2/2 persistent anorexia, nausea, vomiting. Revealed:  Evaluation of the mesenteric vessels is limited by streak artifact from LVAD. There appears to be severe stenosis of the proximal SMA; abdominal mesenteric doppler is recommended for further evaluation. 2.  No small bowel findings to suggest acute mesenteric ischemia. 3.  Focal dissections involving the right and left common iliac arteries.  8/15: Cultures resulted BC 1/2 +GPC in clusters, SC enterobacter; mesenteric duplex: borderline stenosis of proximal SMA  : CT C/A/P noncon: Nondular opacities in R lung apex w/cavitation, abd nl  :  Continue current care, treatment of thyrotoxicosis with medications as per endocrine, d/c ABX as per team.    RUQ sono: Contracted gallbladder with cholecystostomy tube in place.  9/10 failed SMA stent, c/b RP bleed s/p 3U PRCB   CTA Abd/Pelvis L RP hematoma    Trach decannulated    intubated fro resp distress for increased WOB, LL pneumonia; CT C/A/P: progressive ISABELLE opacities and L consolidations, multifocal pneumonia    TTE (EF 25%, decreased RV, mod MR)   10/3 VT -> Lidocaine gtt   10/4 Trach size 6 DCT cuff  10/5 CT abd (neg for intra-abd abcess, severe stenosis of prox SMA and b/l iliac arteries)  10/18 SMA Stent   10/23 RUE midline         Today:    REVIEW OF SYSTEMS:  Speak over trach   Gen: No fever  EYES/ENT: No visual changes;  No vertigo or throat pain   NECK: No pain   RES:  No shortness of breath or Cough  CARD: No chest pain   GI: +intermittently c/o abd pain and nausea   : No dysuria  NEURO: No weakness  SKIN: No itching, rashes       ICU Vital Signs Last 24 Hrs  T(C): 36.1 (25 Oct 2021 08:00), Max: 36.4 (25 Oct 2021 00:00)  T(F): 97 (25 Oct 2021 08:00), Max: 97.6 (25 Oct 2021 00:00)  HR: 103 (25 Oct 2021 11:00) (94 - 123)  BP: --  BP(mean): --  ABP: 94/66 (25 Oct 2021 11:00) (87/64 - 322/322)  ABP(mean): 78 (25 Oct 2021 11:00) (74 - 322)  RR: 18 (25 Oct 2021 11:00) (12 - 37)  SpO2: 100% (25 Oct 2021 11:00) (93% - 100%)      Physical Exam:  Gen:  NAD  CNS: moves all extremities to command   Neck: no JVD, +trach, +central line   RES : coarse breath sounds, no wheezing    CVS: +LVAD hum   Abd: Soft. Sybil drain with bilious fluid. Positive BS throughout. Mild RUQ abdominal tenderness by perc sybil.  Skin: No rash, erythema, cyanosis.  Vasc: Warm and well-perfused.  Ext:  no edema      ============================I/O===========================   I&O's Detail    24 Oct 2021 07:01  -  25 Oct 2021 07:00  --------------------------------------------------------  IN:    dextrose 5% + sodium chloride 0.45%: 30 mL    dextrose 5% + sodium chloride 0.9%: 930 mL    Enteral Tube Flush: 45 mL    IV PiggyBack: 1500 mL    IV PiggyBack: 50 mL    Miscellaneous Tube Feedin mL  Total IN: 2955 mL    OUT:    Drain (mL): 390 mL    Voided (mL): 850 mL  Total OUT: 1240 mL    Total NET: 1715 mL      25 Oct 2021 07:01  -  25 Oct 2021 12:08  --------------------------------------------------------  IN:    dextrose 5% + sodium chloride 0.9%: 50 mL    IV PiggyBack: 375 mL    Miscellaneous Tube Feedin mL  Total IN: 515 mL    OUT:    Drain (mL): 125 mL    Voided (mL): 50 mL  Total OUT: 175 mL    Total NET: 340 mL        ============================ LABS =========================                        8.0    8.68  )-----------( 195      ( 25 Oct 2021 00:44 )             24.3     10-25    128<L>  |  96  |  6<L>  ----------------------------<  67<L>  3.8   |  21<L>  |  0.49<L>    Ca    8.5      25 Oct 2021 00:44  Phos  2.3     10-25  Mg     1.6     10-25    TPro  6.3  /  Alb  3.2<L>  /  TBili  0.5  /  DBili  x   /  AST  12  /  ALT  11  /  AlkPhos  176<H>  10-25    LIVER FUNCTIONS - ( 25 Oct 2021 00:44 )  Alb: 3.2 g/dL / Pro: 6.3 g/dL / ALK PHOS: 176 U/L / ALT: 11 U/L / AST: 12 U/L / GGT: x             ABG - ( 25 Oct 2021 00:31 )  pH, Arterial: 7.39  pH, Blood: x     /  pCO2: 42    /  pO2: 135   / HCO3: 25    / Base Excess: 0.4   /  SaO2: 100.0               ======================Micro/Rad/Cardio=================  Culture: Reviewed   CXR: Reviewed  Echo:Reviewed  ======================================================  PAST MEDICAL & SURGICAL HISTORY:  CHF (congestive heart failure)    CAD (coronary artery disease)    Depression    Pleural effusion    History of 2019 novel coronavirus disease (COVID-19)  2020    Hemorrhoids    Bleeding hemorrhoids    Peripheral arterial disease    Claudication    BPH with urinary obstruction    ACC/AHA stage D systolic heart failure    Anticoagulation goal of INR 2.0 to 2.5    Falls    Clavicle fracture    CKD (chronic kidney disease), stage III    Iron deficiency anemia    H/O epistaxis    Vertigo    GI bleed    S/P TVR (tricuspid valve repair)    S/P ventricular assist device    S/P endoscopy      ====================ASSESSMENT ==============  Stage D Nonischemic Cardiomyopathy, Status Post HM2 on 2017    Cardiogenic shock  Hemodynamic instability   Acute hypoxemic respiratory failure s/p trach , decannulated on ; Reintubated on    GI bleed , Status Post Enteroscopy   Anemia, in setting of melena   Chronic Kidney Disease  Stress hyperglycemia   C.diff positive on    Hypovolemic shock  Septic shock  Leukocytosis  GB thickening/percholecystic s/p perc sybil by IR   SMA stenosis s/p SMA Stent on 10/18   Serratia/citrobacter pneumonia   Stenotrophomonas pneumonia   Enterobacter pneumonia   Nasuea/vomiting  Deconditioning  Hyponatremia    Plan:  ====================== NEUROLOGY=====================  Continue close monitoring of neuro status   C/w sertraline, prochlorperazine, and mirtazapine for depression   Fentanyl PRN for pain        fentaNYL    Injectable 12 MICROGram(s) IV Push every 3 hours PRN Moderate to severe pain  mirtazapine 7.5 milliGRAM(s) Oral daily  prochlorperazine   Tablet 10 milliGRAM(s) Oral three times a day  sertraline 100 milliGRAM(s) Oral daily    ==================== RESPIRATORY======================  Acute hypoxemic respiratory failure s/p #8 shiley trach on ; decannulated on ; Reintubated on ; trach size 6 DCT cuff on 10/4   Continue close monitoring of respiratory rate and breathing pattern, following of ABG’s with [place] monitoring, continuous pulse oximetry monitoring.   Tolerating TC since 10/19, continue as tolerated   Guaifenesin PO PRN mucous plugging     guaiFENesin Oral Liquid (Sugar-Free) 200 milliGRAM(s) Oral every 4 hours PRN mucous plugging    ====================CARDIOVASCULAR==================  Stage D Nonischemic Cardiomyopathy, Status Post HM2 on 2017; LVAD settings 9200, flow 5.7  TTE 10/4:  Severely decreased LV systolic function, Diffuse hypokinesis. Mild AR. No pericardial effusion   VT on 10/4, s/p Lidocaine drip, continue close tele monitoring   ASA/Plavix for recent SMA stent on hold due to drop in h/h and +occult feces on 10/24    ===================HEMATOLOGIC/ONC ===================  CTA A/P on  +L RP hematoma   Acute blood loss anemia, monitor H&H/Plts   Plan to give PRBC, repeat h/h  LUE US on 10/20 with no pseudoaneurysm    ===================== RENAL =========================  Hx of CKD and has signs of hyponatremia, continue monitoring urine output, I&OS, BUN/Cr   Replete lytes PRN. Keep K> 4 and Mg >2    ==================== GASTROINTESTINAL===================  Tolerating tube feeds, Vital AF @ 45cc/hr  CT A/P 10/5 neg for intra-abd abcess, severe stenosis of prox SMA and b/l iliac arteries  CTA A/P : Evaluation of the mesenteric vessels is limited by streak artifact from LVAD. There appears to be severe stenosis of the proximal SMA; abdominal mesenteric doppler is recommended for further evaluation. 2.  No small bowel findings to suggest acute mesenteric ischemia. 3.  Focal dissections involving the right and left common iliac arteries.  Stenosis of proximal SMA, s/p failed SMA stent c/b RP bleed on 9/10 - SMA stent with Vascular 10/18   Simethicone PRN for gas   Reglan for gut motility  Zofran for nausea       dextrose 5% + sodium chloride 0.9%. 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   CT C/A/P : progressive ISABELLE opacities and L consolidations, multifocal pneumonia  BCx 1/2 on 10/1 +Enterobacter cloacae complex, BCx  10/14+ Serratia marcescens   BCx 10/14 and SCX on 10/14 +Stenotrophomonas maltophilia, c/w Bactrim       trimethoprim / sulfamethoxazole IVPB 300 milliGRAM(s) IV Intermittent every 6 hours      Patient requires continuous monitoring with bedside rhythm monitoring, pulse ox monitoring, and intermittent blood gas analysis. Care plan discussed with ICU care team. Patient remained critical and at risk for life threatening decompensation.     By signing my name below, I, Raquel Barone, attest that this documentation has been prepared under the direction and in the presence of Jaclyn Mendoza   Electronically signed: Romel Schmitt, 10-25-21 @ 12:08    I, Jaclyn Mendoza , personally performed the services described in this documentation. all medical record entries made by the romel were at my direction and in my presence. I have reviewed the chart and agree that the record reflects my personal performance and is accurate and complete  Electronically signed: Jaclyn Mendoza NP       RICKY HAMPTON  MRN-[id number]  Patient is a 66y old  Male who presents with a chief complaint of Anemia, Supratherapeutic INR, Dark Stools (25 Oct 2021 10:42)    HPI:  65M PMH ACC/AHA stage D HF due to NICM HM2 LVAD , TV annuloplasty ring 17 as destination therapy due to severe peripheral artery disease with significant stenosis  SIADH, Depression, CKD-3 with hyperkalemia, past E. coli UTIs, driveline drainage (21) and COVID-19 (back in 2020)  He was recently seen in clinic where he complained of abdominal pain and dark stools w constipation back in May. He presents to [facility] ER today weakness and fatigue, moderate and + Black stools for three days, on coumadin secondary to warfarin use in the setting of an LVAD. Patient has required transfusions for GIB in the past. Mostly recently back in 2021 pt had anemia with dark stools. No interventions was done at that time. However Last Endoscopy was done in 2020 (negative). Today labs show patient is anemic with H/H of 4.5/16.3,. INR is 8.84 MAP in the 90s, Temp 35.1. He denies any chest pain, shortness of breath, dizziness, abd pain, nausea or vomiting.       (2021 16:57)      Surgery/Hospital Course:   admit for melena w/ anemia, INR 8.84   6/15 Capsul study (+) for small bowel bleed, balloon endoscopy (old blood in prox ileum); post EGD - septic w/ L opacity, re-intubated for concern for aspiration, TTE (Mod MR, decrease biV w/ interventricular septum boweing towards R)   bronch    +C Diff    CT C/A/P: Fluid filled colon which may be 2/2 rapid transit. Small bilateral pleffs with associates. Compressive atelectasis New ISABELLE & LLL  parenchymal opacities, suspicious for pneumonia. Moderate stenosis in the proximal superior mesenteric artery.    #8 Shiley trach at bedside    LVAD speed increased to 9200   Bronch   TC since . Patient transferred to SDU.    INR today 2.64.  H&H 7.3/24 this AM.  Will repeat CBC at noon, and will send stool guaiac Patient with persistent abdominal tenderness, rate of tube feeds decreased.  No nausea/vomiting.     INR today 2.4. H&H 9.1/28.6 low flow overnight /N&V, refusing Tube feeds on D5 1/2 normal  @50 cc/hr   INR 2.69  H&H 7.7/.1 refusing Tube feeds on D5 1/2 normal  @50 cc/hr. This am + BM Melena Dr Oneill HF  aware- PRBC x1  GI team consulted -  NPO  plan on study in am-  D/w Dr Cadet Patient  to return to CTU for further management; 1PRBCS    Post op INR 2.2 today.  No bleeding. BC + for SM.  Pt is hypotensive requiring pressor and inotropic support.  ID follow up today on Cefepime will follow.   R PTC for PTX    CT C/A/P: sub q emphysema in R chest wall, GGO RUL, small ascites CTH negative; Abd US: GB thickening, pericholecystic fluid     Perchole drain in place continues to drain total output overnight 133.  Fever today 38.8    duplex LE negative    Patient with persistent abdominal pain, refusing tube feeds and medications, Psych consulted   CTA A/P ordered to r/o mesenteric ischemia 2/2 persistent anorexia, nausea, vomiting. Revealed:  Evaluation of the mesenteric vessels is limited by streak artifact from LVAD. There appears to be severe stenosis of the proximal SMA; abdominal mesenteric doppler is recommended for further evaluation. 2.  No small bowel findings to suggest acute mesenteric ischemia. 3.  Focal dissections involving the right and left common iliac arteries.  8/15: Cultures resulted BC 1/2 +GPC in clusters, SC enterobacter; mesenteric duplex: borderline stenosis of proximal SMA  : CT C/A/P noncon: Nondular opacities in R lung apex w/cavitation, abd nl  :  Continue current care, treatment of thyrotoxicosis with medications as per endocrine, d/c ABX as per team.    RUQ sono: Contracted gallbladder with cholecystostomy tube in place.  9/10 failed SMA stent, c/b RP bleed s/p 3U PRCB   CTA Abd/Pelvis L RP hematoma    Trach decannulated    intubated fro resp distress for increased WOB, LL pneumonia; CT C/A/P: progressive ISABELLE opacities and L consolidations, multifocal pneumonia    TTE (EF 25%, decreased RV, mod MR)   10/3 VT -> Lidocaine gtt   10/4 Trach size 6 DCT cuff  10/5 CT abd (neg for intra-abd abcess, severe stenosis of prox SMA and b/l iliac arteries)  10/18 SMA Stent   10/23 Emend for nausea   10/24 +Occult     Today:  Tolerating tube feeds, Vital AF @ 30cc/hr, plan to maintain at 30 today. TC since 10/19, continue TC as tolerated. Persistent hyponatremia, while on NS+D5 @ 30 ml/h - Will f/u with cardiorenal for concern of possible SIADH. Ambulate as tolerated. Patient is candidate for transfer to stepdown unit.     REVIEW OF SYSTEMS:  Speak over trach   Gen: No fever  EYES/ENT: No visual changes;  No vertigo or throat pain   NECK: No pain   RES:  No shortness of breath or Cough  CARD: No chest pain   GI: +intermittently c/o abd pain and nausea   : No dysuria  NEURO: No weakness  SKIN: No itching, rashes       ICU Vital Signs Last 24 Hrs  T(C): 36.1 (25 Oct 2021 08:00), Max: 36.4 (25 Oct 2021 00:00)  T(F): 97 (25 Oct 2021 08:00), Max: 97.6 (25 Oct 2021 00:00)  HR: 103 (25 Oct 2021 11:00) (94 - 123)  BP: --  BP(mean): --  ABP: 94/66 (25 Oct 2021 11:00) (87/64 - 322/322)  ABP(mean): 78 (25 Oct 2021 11:00) (74 - 322)  RR: 18 (25 Oct 2021 11:00) (12 - 37)  SpO2: 100% (25 Oct 2021 11:00) (93% - 100%)      Physical Exam:  Gen:  NAD  CNS: moves all extremities to command   Neck: no JVD, +trach, +central line   RES : coarse breath sounds, no wheezing    CVS: +LVAD hum   Abd: Soft. Sybil drain with bilious fluid. Positive BS throughout. Mild RUQ abdominal tenderness by perc sybil.  Skin: No rash, erythema, cyanosis.  Vasc: Warm and well-perfused.  Ext:  no edema      ============================I/O===========================   I&O's Detail    24 Oct 2021 07:  -  25 Oct 2021 07:00  --------------------------------------------------------  IN:    dextrose 5% + sodium chloride 0.45%: 30 mL    dextrose 5% + sodium chloride 0.9%: 930 mL    Enteral Tube Flush: 45 mL    IV PiggyBack: 1500 mL    IV PiggyBack: 50 mL    Miscellaneous Tube Feedin mL  Total IN: 2955 mL    OUT:    Drain (mL): 390 mL    Voided (mL): 850 mL  Total OUT: 1240 mL    Total NET: 1715 mL      25 Oct 2021 07:01  -  25 Oct 2021 12:08  --------------------------------------------------------  IN:    dextrose 5% + sodium chloride 0.9%: 50 mL    IV PiggyBack: 375 mL    Miscellaneous Tube Feedin mL  Total IN: 515 mL    OUT:    Drain (mL): 125 mL    Voided (mL): 50 mL  Total OUT: 175 mL    Total NET: 340 mL        ============================ LABS =========================                        8.0    8.68  )-----------( 195      ( 25 Oct 2021 00:44 )             24.3     10-25    128<L>  |  96  |  6<L>  ----------------------------<  67<L>  3.8   |  21<L>  |  0.49<L>    Ca    8.5      25 Oct 2021 00:44  Phos  2.3     10-  Mg     1.6     10    TPro  6.3  /  Alb  3.2<L>  /  TBili  0.5  /  DBili  x   /  AST  12  /  ALT  11  /  AlkPhos  176<H>  10    LIVER FUNCTIONS - ( 25 Oct 2021 00:44 )  Alb: 3.2 g/dL / Pro: 6.3 g/dL / ALK PHOS: 176 U/L / ALT: 11 U/L / AST: 12 U/L / GGT: x             ABG - ( 25 Oct 2021 00:31 )  pH, Arterial: 7.39  pH, Blood: x     /  pCO2: 42    /  pO2: 135   / HCO3: 25    / Base Excess: 0.4   /  SaO2: 100.0               ======================Micro/Rad/Cardio=================  Culture: Reviewed   CXR: Reviewed  Echo:Reviewed  ======================================================  PAST MEDICAL & SURGICAL HISTORY:  CHF (congestive heart failure)    CAD (coronary artery disease)    Depression    Pleural effusion    History of 2019 novel coronavirus disease (COVID-19)  2020    Hemorrhoids    Bleeding hemorrhoids    Peripheral arterial disease    Claudication    BPH with urinary obstruction    ACC/AHA stage D systolic heart failure    Anticoagulation goal of INR 2.0 to 2.5    Falls    Clavicle fracture    CKD (chronic kidney disease), stage III    Iron deficiency anemia    H/O epistaxis    Vertigo    GI bleed    S/P TVR (tricuspid valve repair)    S/P ventricular assist device    S/P endoscopy      ====================ASSESSMENT ==============  Stage D Nonischemic Cardiomyopathy, Status Post HM2 on 2017    Cardiogenic shock  Hemodynamic instability   Acute hypoxemic respiratory failure s/p trach 6/25, decannulated on ; Reintubated on    GI bleed , Status Post Enteroscopy   Anemia, in setting of melena   Chronic Kidney Disease  Stress hyperglycemia   C.diff positive on    Hypovolemic shock  Septic shock  Leukocytosis  GB thickening/percholecystic s/p perc sybil by IR   SMA stenosis s/p SMA Stent on 10/18   Serratia/citrobacter pneumonia   Stenotrophomonas pneumonia   Enterobacter pneumonia   Nasuea/vomiting  Deconditioning  Hyponatremia    Plan:  ====================== NEUROLOGY=====================  Continue close monitoring of neuro status   C/w sertraline, prochlorperazine, and mirtazapine for depression   Fentanyl PRN for pain    Ambulate as tolerated.     fentaNYL    Injectable 12 MICROGram(s) IV Push every 3 hours PRN Moderate to severe pain  mirtazapine 7.5 milliGRAM(s) Oral daily  prochlorperazine   Tablet 10 milliGRAM(s) Oral three times a day  sertraline 100 milliGRAM(s) Oral daily    ==================== RESPIRATORY======================  Acute hypoxemic respiratory failure s/p #8 shiley trach on ; decannulated on ; Reintubated on ; trach size 6 DCT cuff on 10/4   Continue close monitoring of respiratory rate and breathing pattern, following of ABG’s with [place] monitoring, continuous pulse oximetry monitoring.   Tolerating TC since 10/19, continue as tolerated   Guaifenesin PO PRN mucous plugging     guaiFENesin Oral Liquid (Sugar-Free) 200 milliGRAM(s) Oral every 4 hours PRN mucous plugging    ====================CARDIOVASCULAR==================  Stage D Nonischemic Cardiomyopathy, Status Post HM2 on 2017; LVAD settings 9200, flow 5.4  TTE 10/4:  Severely decreased LV systolic function, Diffuse hypokinesis. Mild AR. No pericardial effusion   VT on 10/4, s/p Lidocaine drip, continue close tele monitoring   ASA/Plavix for recent SMA stent on hold due to drop in h/h and +occult feces on 10/24    ===================HEMATOLOGIC/ONC ===================  CTA A/P on  +L RP hematoma   Acute blood loss anemia, monitor H&H/Plts   LUE US on 10/20 with no pseudoaneurysm  +occult feces on 10/24     ===================== RENAL =========================  Persistent hyponatremia, currently on  NS+D5 @ 30 ml/h - Will f/u with cardiorenal for concern of possible SIADH  Hx of CKD, continue monitoring urine output, I&OS, BUN/Cr   Replete lytes PRN. Keep K> 4 and Mg >2    ==================== GASTROINTESTINAL===================  Tolerating tube feeds, Vital AF @ 30cc/hr, plan to maintain at 30 today   CT A/P 10/5 neg for intra-abd abcess, severe stenosis of prox SMA and b/l iliac arteries  CTA A/P : Evaluation of the mesenteric vessels is limited by streak artifact from LVAD. There appears to be severe stenosis of the proximal SMA; abdominal mesenteric doppler is recommended for further evaluation. 2.  No small bowel findings to suggest acute mesenteric ischemia. 3.  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  Stenosis of proximal SMA, s/p failed SMA stent c/b RP bleed on 9/10 - SMA stent with Vascular 10/18   Simethicone PRN for gas   Reglan for gut motility  Zofran for nausea   +occult feces on 10/24     dextrose 5% + sodium chloride 0.9%. 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   CT C/A/P : progressive ISABELLE opacities and L consolidations, multifocal pneumonia  BCx 1/2 on 10/1 +Enterobacter cloacae complex, BCx  10/14+ Serratia marcescens   BCx 10/14 and SCX on 10/14 +Stenotrophomonas maltophilia, c/w Bactrim   BCx 10/22 and 10/23 NGTD     trimethoprim / sulfamethoxazole IVPB 300 milliGRAM(s) IV Intermittent every 6 hours      Patient requires continuous monitoring with bedside rhythm monitoring, pulse ox monitoring, and intermittent blood gas analysis. Care plan discussed with ICU care team. Patient remained critical and at risk for life threatening decompensation.     By signing my name below, Raquel STANLEY, attest that this documentation has been prepared under the direction and in the presence of Jaclyn Mendoza NP  Electronically signed: Cesia Schmitt, 10-25-21 @ 12:08    Jaclyn STANLEY , personally performed the services described in this documentation. all medical record entries made by the scribe were at my direction and in my presence. I have reviewed the chart and agree that the record reflects my personal performance and is accurate and complete  Electronically signed: Jaclyn Mendoza NP

## 2021-10-25 NOTE — PROGRESS NOTE ADULT - ASSESSMENT
65 YO M with a history of stage D NICM s/p HM2 on 9/2017 as DT (due to severe PAD) with TV ring, prior COVID-19 infection 4/2020, recurrent syncope post LVAD s/p ILR, and chronic abdominal pain with prior negative workup who was admitted 6/14/21 with symptomatic anemia with Hgb 4.5 in setting of INR 8.8 without hemodynamic instability and has since had a protracted hospitalization. He was transfused and underwent VCE which showed active bleeding in the mid small bowel but subsequent enteroscopy 6/15 did not reveal any active bleeding. He acutely decompensated after procedure with fever/hypertension, low flow alarms, and pulmonary infiltrate with hypoxia requiring intubation from probable aspiration PNA. He was unable to extubated and has since undergone tracheostomy but tolerating persistent trach collar and nearing ability for decannulation. 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 He underwent angiogram on 9/10, stent was unable to be placed and course was then complicated by RP bleed. He continued to have persistent leukocytosis and febrile episodes and was noted to have positive sputum culture for serratia marcescens and completed his course of abx. He was initially decannulated on 9/23. Recently he started having multiples of melena, emesis and went into acte respiratory distress and was ultimately re-intubated on 9/26.     He had blood cultures positive for enterobacter cloacae/serratia and sputum positive for stenotrophomonas remains on IV antibiotics. He is s/p trach with ENT on 10/4. He underwent SMA stent x 2 on 10/18. Since undergoing the procedure he continues to have episodes of nausea/vomiting for which tube feed rates have been decreased. Fecal occult blood is positive, he is currently off DAPT. Has been transfused with multiple units of PRBC during this admission, last on 10/23. His overall prognosis remains poor, he is now DNR after family meeting.

## 2021-10-25 NOTE — PROGRESS NOTE ADULT - ASSESSMENT
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 coli UTIs, driveline drainage (1/7/21) and COVID-19, here initially for GIB prolonged hospital course, s/p tracheostomy, acalculous cholecystitis s/p perc dawn. Patient has persistent intermittent abdominal pain, and was being evaluated for chronic mesenteric ischemia vs SMA syndrome.  8/13 bld cxs positive. TPN was consulted for Protein Calorie Malnutrition, Prealb 11, prior a1c 5.6, tg 141.    -Tube feeds as per CTU team, advance to goal as able  -Hyperthyroidism- labs improved, care per endo  -Abdominal pain/vomiting- multifactorial; sp sma stent by vascular; cont anti emetics prn  -Hyponatremia- suggest urine lytes, renal consult  -Electrolyte Imbalance Risk- monitor and replete elytes as needed  -Anemia- rec iron supp when feasible  -Seen by palliative care for goc management   -Further care as per CTU; will remain available as needed    plan discussed with Dr. Soliz and Dr ANNE Tang, agreeable with above    TPN pager [phone], spectra [phone]  M-F 6A-4P, Weekends and holidays 8A-12P

## 2021-10-25 NOTE — CONSULT NOTE ADULT - REASON FOR ADMISSION
Anemia, Supratherapeutic INR, Dark Stools
anemia and abdominal pain
Anemia, Supratherapeutic INR, Dark Stools
Anemia, Supratherapeutic INR, Dark Stools

## 2021-10-25 NOTE — CONSULT NOTE ADULT - ATTENDING COMMENTS
Hyponatremia: chronic, intermittent, does not appear volume overloaded  in the setting of use of IV D5w, SSRI and pain  sodium level stable today  will consider addition of UreNa, if sodium does not continue to improve.    Melissa Chin MD  Cell : [phone]  Pager : [phone]  Office : [phone]

## 2021-10-25 NOTE — CONSULT NOTE ADULT - SUBJECTIVE AND OBJECTIVE BOX
[facility] DIVISION OF KIDNEY DISEASES AND HYPERTENSION -- [phone]  -- INITIAL CONSULT NOTE  --------------------------------------------------------------------------------  HPI:  Patient is a 66 year old male with PMH of stage D NICM s/p HM2 on 9/2017 as DT (due to severe PAD) with TV ring, prior COVID-19 infection 4/2020, recurrent syncope post LVAD s/p ILR, and chronic abdominal pain with prior negative workup who was admitted 6/14/21 with symptomatic anemia with Hgb 4.5 in setting of INR 8.8 without hemodynamic instability and has since had a prolonged hospital course. The patient is unable to provide history due to tracheostomy so most of it was obtained through chart review. The patient underwent a VCE which was positive for an active bleeding in the mid small bowel but subsequent enteroscopy 6/15 did not reveal any active bleeding. Following the patient decompensated with  fever/hypertension and pulmonary infiltrate with hypoxia requiring intubation concerning for aspiration PNA. The patient has been unable to to be extubated and has undergone a tracheostomy on 10/4. The hospital course has also been complicated by VAP, serratia bacteremia with acalculous cholecystitis s/p percutaneous tube, thyrotoxicosis with hyperthyroidism likely related to amiodarone, and persistent abdominal pain from mesenteric ischemia from MA stenosis that has prevented adequate enteral nutrition. The nephrology team was consulted for hyponatremia over the past one week. The patient reports he has been urinating just well and he does not have a aleman catheter. He reported some pain in his abdominal area but denied any episodes of vomiting recently.     PAST HISTORY  --------------------------------------------------------------------------------  PAST MEDICAL & SURGICAL HISTORY:  CHF (congestive heart failure)    CAD (coronary artery disease)    Depression    Pleural effusion    History of 2019 novel coronavirus disease (COVID-19)  april 2020    Hemorrhoids    Bleeding hemorrhoids    Peripheral arterial disease    Claudication    BPH with urinary obstruction    ACC/AHA stage D systolic heart failure    Anticoagulation goal of INR 2.0 to 2.5    Falls    Clavicle fracture    CKD (chronic kidney disease), stage III    Iron deficiency anemia    H/O epistaxis    Vertigo    GI bleed    S/P TVR (tricuspid valve repair)    S/P ventricular assist device    S/P endoscopy      FAMILY HISTORY:    PAST SOCIAL HISTORY:    ALLERGIES & MEDICATIONS  --------------------------------------------------------------------------------  Allergies    Amiodarone Hydrochloride (Other (Severe))    Intolerances      Standing Inpatient Medications  chlorhexidine 2% Cloths 1 Application(s) Topical <User Schedule>  dextrose 5% + sodium chloride 0.9%. 1000 milliLiter(s) IV Continuous <Continuous>  insulin lispro (ADMELOG) corrective regimen sliding scale   SubCutaneous every 6 hours  methimazole 20 milliGRAM(s) Oral daily  metoclopramide Injectable 10 milliGRAM(s) IV Push every 8 hours  mirtazapine 7.5 milliGRAM(s) Oral daily  multivitamin 1 Tablet(s) Oral daily  ondansetron Injectable 8 milliGRAM(s) IV Push every 8 hours  pantoprazole  Injectable 40 milliGRAM(s) IV Push every 12 hours  predniSONE   Tablet 5 milliGRAM(s) Oral every 24 hours  prochlorperazine   Tablet 10 milliGRAM(s) Oral three times a day  sertraline 100 milliGRAM(s) Oral daily  thiamine 100 milliGRAM(s) Oral daily  trimethoprim / sulfamethoxazole IVPB 300 milliGRAM(s) IV Intermittent every 6 hours    PRN Inpatient Medications  fentaNYL    Injectable 12 MICROGram(s) IV Push every 3 hours PRN  guaiFENesin Oral Liquid (Sugar-Free) 200 milliGRAM(s) Oral every 4 hours PRN  simethicone 80 milliGRAM(s) Chew every 8 hours PRN      REVIEW OF SYSTEMS  All other systems were reviewed and are negative, except as noted.    VITALS/PHYSICAL EXAM  --------------------------------------------------------------------------------  T(C): 36.1 (10-25-21 @ 08:00), Max: 36.4 (10-25-21 @ 00:00)  HR: 122 (10-25-21 @ 14:00) (98 - 132)  BP: --  RR: 30 (10-25-21 @ 14:00) (12 - 37)  SpO2: 99% (10-25-21 @ 14:00) (92% - 100%)  Wt(kg): --        10-24-21 @ 07:01  -  10-25-21 @ 07:00  --------------------------------------------------------  IN: 2955 mL / OUT: 1240 mL / NET: 1715 mL    10-25-21 @ 07:01  -  10-25-21 @ 14:35  --------------------------------------------------------  IN: 1060 mL / OUT: 175 mL / NET: 885 mL      Physical Exam:  	Gen: NAD, chachectic  	HEENT: MMM, tracheostomy in place   	Pulm: CTA B/L  	CV: S1S2  	Abd: Soft, +BS, percutaneous dawn-tube  	Ext: No LE edema B/L  	Neuro: Awake and alert   	Skin: Warm and dry  	Vascular access: N/A    LABS/STUDIES  --------------------------------------------------------------------------------              8.0    8.68  >-----------<  195      [10-25-21 @ 00:44]              24.3     128  |  96  |  6   ----------------------------<  67      [10-25-21 @ 00:44]  3.8   |  21  |  0.49        Ca     8.5     [10-25-21 @ 00:44]      Mg     1.6     [10-25-21 @ 00:44]      Phos  2.3     [10-25-21 @ 00:44]    TPro  6.3  /  Alb  3.2  /  TBili  0.5  /  DBili  x   /  AST  12  /  ALT  11  /  AlkPhos  176  [10-25-21 @ 00:44]              [10-25-21 @ 00:44]  Serum Osmolality 263      [10-24-21 @ 22:39]    Creatinine Trend:  SCr 0.49 [10-25 @ 00:44]  SCr 0.54 [10-24 @ 01:10]  SCr 0.60 [10-23 @ 01:08]  SCr 0.61 [10-22 @ 01:08]  SCr 0.56 [10-21 @ 00:33]    Urinalysis - [09-26-21 @ 03:33]      Color Light Yellow / Appearance Slightly Turbid / SG 1.007 / pH 6.5      Gluc Negative / Ketone Negative  / Bili Negative / Urobili Negative       Blood Moderate / Protein Trace / Leuk Est Moderate / Nitrite Negative       / WBC 26 / Hyaline 8 / Gran  / Sq Epi  / Non Sq Epi 7 / Bacteria Negative    Urine Osmolality 382      [10-24-21 @ 22:39]    Iron 27, TIBC 202, %sat 13      [08-15-21 @ 10:20]  Ferritin 498      [08-15-21 @ 10:20]  Vitamin D (25OH) 11.0      [01-12-21 @ 14:15]  HbA1c 6.2      [09-01-17 @ 00:08]  TSH <0.01      [09-25-21 @ 04:14]  Lipid: chol 153, , HDL 34, LDL --      [08-15-21 @ 13:53]    HBsAb Reactive      [01-14-21 @ 10:47]  HBsAg Nonreact      [01-14-21 @ 10:47]  HBcAb Reactive      [01-14-21 @ 10:47]  HCV 0.08, Nonreact      [01-14-21 @ 10:47]  HIV Nonreact      [01-14-21 @ 08:18]    DORYS: titer 1:320, pattern Homogeneous      [08-15-21 @ 10:35]  Rheumatoid Factor 19      [08-15-21 @ 10:20]  ANCA: cANCA Negative, pANCA Negative, atypical ANCA Negative      [08-15-21 @ 10:35]  Syphilis Screen (Treponema Pallidum Ab) Negative      [08-15-21 @ 10:35]  Free Light Chains: kappa 4.78, lambda 3.60, ratio = 1.33      [08-15 @ 10:20]  Immunofixation Serum: No Monoclonal Band Identified    Reference Range: None Detected      [08-15-21 @ 10:20]  SPEP Interpretation: Polyclonal Gammopathy      [08-15-21 @ 10:20]  Immunofixation Urine: Reference Range: None Detected      [01-20-21 @ 15:16]  UPEP Interpretation: Normal Electrophoresis Pattern      [01-20-21 @ 15:16]

## 2021-10-25 NOTE — PROGRESS NOTE ADULT - PROBLEM SELECTOR PLAN 2
-Pt now at physiologic dosing of steroids so no need to test BS at this point.  -discussed with ELVIE TAYLOR.  Sally Beatty MD  Endocrinology Attending  Mondays and Tuesdays 9am-6pm: [phone] (pager)  Other days, night and weekend: [phone]

## 2021-10-25 NOTE — PROGRESS NOTE ADULT - PROBLEM SELECTOR PLAN 3
- S/p SMA stent 10/18, antiplatelet discontinued 10/23 for bleeding requiring blood products - S/p SMA stent 10/18, antiplatelet discontinued on 10/23 for bleeding requiring blood products

## 2021-10-25 NOTE — PROGRESS NOTE ADULT - PROBLEM SELECTOR PLAN 6
- On methimazole and hydrocortisone taper as per endocrinology - On methimazole and prednisone taper as per endocrinology

## 2021-10-25 NOTE — CONSULT NOTE ADULT - ASSESSMENT
Patient is a 66 year old male with PMH of stage D NICM s/p HM2 on 9/2017 as DT (due to severe PAD) with TV ring, prior COVID-19 infection 4/2020, recurrent syncope post LVAD s/p ILR, and chronic abdominal pain with prior negative workup who was admitted 6/14/21 with symptomatic anemia with Hgb 4.5 in setting of INR 8.8 without hemodynamic instability and has since had a prolonged hospital course. The nephrology team was consulted for hyponatremia over the past one week.     Hyponatremia:   - patient admitted 6/14 and has had a prolonged hospital course since with episodes of bacteremia   - hyponatremia with a sodium of 128 this AM; improved from 124 yesterday   - suspect this is the setting of excess free water with the patient on IVF with D5 as well as receiving bactrim in D5 solution as well   --- total calculates to be 1.2L of D5 with bactrim as Q6h   - can discuss with pharmacy and ID about possible alternate abx for bacteremia    - please also send for urine Na, Urine osm, and serum osm   - tolerating tube feeds at this time   - can repeat BMP this evening at 6pm   - patient asymptomatic at this time     If any questions, please feel free to contact me     Gary Webb  Nephrology Fellow  [facility] Pager: [phone]   Patient is a 66 year old male with PMH of stage D NICM s/p HM2 on 9/2017 as DT (due to severe PAD) with TV ring, prior COVID-19 infection 4/2020, recurrent syncope post LVAD s/p ILR, and chronic abdominal pain with prior negative workup who was admitted 6/14/21 with symptomatic anemia with Hgb 4.5 in setting of INR 8.8 without hemodynamic instability and has since had a prolonged hospital course. The nephrology team was consulted for hyponatremia over the past one week.     Hyponatremia:   - patient admitted 6/14 and has had a prolonged hospital course since with episodes of bacteremia   - hyponatremia with a sodium of 128 this AM; improved from 124 yesterday   - suspect this is the setting of excess free water with the patient on IVF with D5 as well as receiving bactrim in D5 solution as well, also is on sertraline that can contribute to hyponatremia  --- total calculates to be 1.2L of D5 with bactrim as Q6h   - can discuss with pharmacy and ID about possible alternate abx for bacteremia    - please also send for urine Na, Urine osm, and serum osm   - tolerating tube feeds at this time   - monitor BMP   - patient asymptomatic at this time     If any questions, please feel free to contact me     Gary Webb  Nephrology Fellow  [facility] Pager: [phone]

## 2021-10-25 NOTE — CONSULT NOTE ADULT - CONSULT REQUESTED DATE/TIME
14-Jun-2021 17:17
11-Aug-2021 16:22
14-Aug-2021 11:30
14-Jun-2021 12:00
15-Aug-2021 11:57
19-Jun-2021 14:00
25-Oct-2021 14:35
27-Sep-2021 14:29
25-Aug-2021 16:02
06-Oct-2021
13-Aug-2021 18:45
15-Jul-2021 12:10
23-Jul-2021
14-Aug-2021 11:25
15-Jamil-2021
19-Aug-2021 00:00
07-Sep-2021 19:22
23-Jun-2021 11:15
29-Jul-2021 09:58
02-Aug-2021
08-Aug-2021 16:09
27-Sep-2021 14:29

## 2021-10-25 NOTE — PROGRESS NOTE ADULT - PROBLEM SELECTOR PLAN 5
-   leukocytosis improving  most recent cultures negative   - currently on abx, duration and choice as per ID  - Prior cholecystitis w/ per dawn drain with bilious output. - leukocytosis improved  most recent culture from 10/22 negative   - currently on abx, duration and choice as per ID  - Prior cholecystitis w/ per dawn drain with bilious output.

## 2021-10-25 NOTE — PROGRESS NOTE ADULT - PROBLEM SELECTOR PLAN 7
- Has been transfused multiple times throughout this admission, last transfusion was on 10/23  -Antiplatelet currently held for bleeding. Favor at least 1 antiplatelet agent given fresh stent however defer to vascular - Has been transfused multiple times throughout this admission, last transfusion was on 10/23  -Antiplatelet has been discontinued for bleeding. Would favor at least 1 antiplatelet agent given fresh stent however defer to vascular

## 2021-10-25 NOTE — CONSULT NOTE ADULT - CONSULT REASON
r/o mesenteric ischemia
Assist the team in the ethical dilemma posed by a 66 year old male with complex medical history and prolonged hospitalization (>2 months) who is now refusing nutrition.
Evaluate mood/anxiety
SMA stenosis
Trach
abdominal pain
pericholecystic fluid /  transaminitis / abdominal pain
r/o vasculitis
sepsis post procedure
Hyponatremia
Hyperthyroidism
LVAD patient
LVAD, inability to tolerate feeds, re-intubated seconday to ?aspiration, GOC
Evaluation for TPN
LVAD, inability to tolerate feeds, re-intubated seconday to ?aspiration, GOC
anemia
Assist the team in the ethical dilemma posed by a 66 year old male with complex medical history and prolonged hospitalization (almost 4 months) who is refusing procedures.
SMA stent placement
recurrent Acute respiratory hypoxic  Failure   S/P TV Annuloplasty  Aspiration pneumonia
anemia
Hyperthyroidism
GOC, s/p LVAD, recurrent GIB

## 2021-10-25 NOTE — PROGRESS NOTE ADULT - SUBJECTIVE AND OBJECTIVE BOX
Subjective: Patient seen and examined resting in bed.     Medications:  chlorhexidine 2% Cloths 1 Application(s) Topical <User Schedule>  dextrose 5% + sodium chloride 0.9%. 1000 milliLiter(s) IV Continuous <Continuous>  fentaNYL    Injectable 12 MICROGram(s) IV Push every 3 hours PRN  guaiFENesin Oral Liquid (Sugar-Free) 200 milliGRAM(s) Oral every 4 hours PRN  insulin lispro (ADMELOG) corrective regimen sliding scale   SubCutaneous every 6 hours  methimazole 20 milliGRAM(s) Oral daily  metoclopramide Injectable 10 milliGRAM(s) IV Push every 8 hours  mirtazapine 7.5 milliGRAM(s) Oral daily  multivitamin 1 Tablet(s) Oral daily  ondansetron Injectable 8 milliGRAM(s) IV Push every 8 hours  pantoprazole  Injectable 40 milliGRAM(s) IV Push every 12 hours  potassium phosphate IVPB 15 milliMole(s) IV Intermittent once  predniSONE   Tablet 5 milliGRAM(s) Oral every 24 hours  prochlorperazine   Tablet 10 milliGRAM(s) Oral three times a day  sertraline 100 milliGRAM(s) Oral daily  simethicone 80 milliGRAM(s) Chew every 8 hours PRN  thiamine 100 milliGRAM(s) Oral daily  trimethoprim / sulfamethoxazole IVPB 300 milliGRAM(s) IV Intermittent every 6 hours      ICU Vital Signs Last 24 Hrs  T(C): 36.1 (25 Oct 2021 08:00), Max: 36.4 (25 Oct 2021 00:00)  T(F): 97 (25 Oct 2021 08:00), Max: 97.6 (25 Oct 2021 00:00)  HR: 106 (25 Oct 2021 08:00) (94 - 123)  BP: --  BP(mean): --  ABP: 97/69 (25 Oct 2021 08:00) (81/57 - 322/322)  ABP(mean): 81 (25 Oct 2021 08:00) (66 - 322)  RR: 17 (25 Oct 2021 08:00) (12 - 37)  SpO2: 100% (25 Oct 2021 08:00) (87% - 100%)    Weight in k.2 (10-25 @ 00:00)    I&O's Summary    24 Oct 2021 07:01  -  25 Oct 2021 07:00  --------------------------------------------------------  IN: 2955 mL / OUT: 1240 mL / NET: 1715 mL    25 Oct 2021 07:01  -  25 Oct 2021 08:54  --------------------------------------------------------  IN: 330 mL / OUT: 175 mL / NET: 155 mL        Physical Exam  General: frail, thin appearing  Neck: +trach collar  Chest: Clear to auscultation bilaterally  CV: +VAD hum  Abdomen: Soft, non-distended, tenderness noted over epigastric region, +biliary drain  Neurology: Alert and oriented times three. Sensation intact      LVAD Interrogation  VAD TYPE HM 2  Speed 9200  Flow 5.5  Power 5.7  PI 5.8  Assessment of driveline exit site: driveline dressing c/d/i  Programming changes: no changes made    Labs:                        8.0    8.68  )-----------( 195      ( 25 Oct 2021 00:44 )             24.3     10-25    128<L>  |  96  |  6<L>  ----------------------------<  67<L>  3.8   |  21<L>  |  0.49<L>    Ca    8.5      25 Oct 2021 00:44  Phos  2.3     10-25  Mg     1.6     10-25    TPro  6.3  /  Alb  3.2<L>  /  TBili  0.5  /  DBili  x   /  AST  12  /  ALT  11  /  AlkPhos  176<H>  10-25              Lactate Dehydrogenase, Serum: 204 U/L (10-25 @ 00:44)  Lactate Dehydrogenase, Serum: 216 U/L (10-24 @ 01:10)  Lactate Dehydrogenase, Serum: 237 U/L (10-23 @ 01:08)

## 2021-10-25 NOTE — PROGRESS NOTE ADULT - ATTENDING COMMENTS
Will advance tube feeds today and assess tolerance. Will also consult cardiorenal service given persistent hyponatremia (? candidate for urena). His LVAD was interrogated and there were no events other than occasional PI events. His LDH remains normal off systemic anticoagulation.

## 2021-10-25 NOTE — PROGRESS NOTE ADULT - PROBLEM SELECTOR PLAN 1
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 Unable to get a thyroid US due to his trach.   -Taper steroids as thus: Prednisone 5mg po qdaily x 3days, 4 mg po qdaily x 3 days, 3mg po qdaily x 3 days, 2mg po qdaily x 3 days, and 1mg po qdaily x 3 days.  Last week, we asked team to decrease MMI to 20mg po qdaily but it was not done. Team changed it yesterday. FT4 today is 1.2 so can decrease to 15mg po qdaily.   Continue to assess bilirubin and LFTs. MMI can have rare SE of agranulocytosis and hepatic dysfunction, but more commonly its biliary in nature.  Recheck FT4 on 11/1.

## 2021-10-25 NOTE — PROGRESS NOTE ADULT - PROBLEM SELECTOR PLAN 4
- Trickle tube feeds restarted despite continuing nausea and vomiting, advance as tolerated - currently tolerating tube feeds @ 30 cc/hr, would gradually increase as tolerated.

## 2021-10-25 NOTE — CONSULT NOTE ADULT - PROVIDER SPECIALTY LIST ADULT
Endocrinology
Transplant ID
Palliative Care
Vascular Surgery
Pulmonology
ENT
Ethics
Gastroenterology
Ethics
Rheumatology
Surgery
Nephrology
Psychology
Nutrition Support
Vascular Surgery
Gastroenterology
Intervent Radiology
Intervent Radiology
Palliative Care
Heart Failure
Endocrinology
Palliative Care

## 2021-10-25 NOTE — CONSULT NOTE ADULT - CONSULT REQUESTED BY NAME
Dr. Janusz Cadet
HOMAR HERNANDEZ
STEPHANIE Piecre MD
CTICU
CTU
Dr Cadet
Dr. Cadet
Dr. Cadet
Awais
CTU
Dr. Janusz Cadet
Dr. Sanchez
Helio
LISA Marte MD
Primary Team
Zi Werner MD
ct surgery
CTU
Helio
Janusz Cadet MD
Dr. Cadet
Dr Cadet

## 2021-10-26 NOTE — PROGRESS NOTE ADULT - PROBLEM SELECTOR PLAN 5
- leukocytosis improved. Most recent culture from 10/22 negative. Remains afebrile and is currently off antibiotics   - Prior cholecystitis w/ per dawn drain with bilious output.

## 2021-10-26 NOTE — PROGRESS NOTE ADULT - PROBLEM SELECTOR PLAN 8
- overall improving, will continue to monitor  - Nephrology consulted and appreciate recommendations

## 2021-10-26 NOTE — PROGRESS NOTE ADULT - PROBLEM SELECTOR PLAN 2
- Stable w/o evidence of LVAD malfunction, settings as above  - H/o intolerability to AC or ECASA due to recurrent GI bleeding  - Trend daily LDH, currently stable. Patient/Caregiver provided printed discharge information.

## 2021-10-26 NOTE — PROGRESS NOTE ADULT - ASSESSMENT
Patient is a 66 year old male with PMH of stage D NICM s/p HM2 on 9/2017 as DT (due to severe PAD) with TV ring, prior COVID-19 infection 4/2020, recurrent syncope post LVAD s/p ILR, and chronic abdominal pain with prior negative workup who was admitted 6/14/21 with symptomatic anemia with Hgb 4.5 in setting of INR 8.8 without hemodynamic instability and has since had a prolonged hospital course. The nephrology team was consulted for hyponatremia over the past one week.     Hyponatremia:   - patient admitted 6/14 and has had a prolonged hospital course since with episodes of bacteremia   - hyponatremia with a sodium of 129 this AM; stable right now   - suspect this is the setting of excess free water with the patient on IVF with D5 as well as receiving bactrim in D5 solution as well, also is on sertraline that can contribute to hyponatremia  - now that has finished course of bactrim and less infusion of free water expect sodium levels to improve    - monitor BMP   - patient asymptomatic at this time     If any questions, please feel free to contact me     Gary Webb  Nephrology Fellow  [facility] Pager: [phone]

## 2021-10-26 NOTE — PROGRESS NOTE ADULT - SUBJECTIVE AND OBJECTIVE BOX
INFECTIOUS DISEASES FOLLOW UP-- Anitra Prater  [phone]    This is a follow up note for this  66yMale with  Anemia    Acute cholecystitis        ROS:  CONSTITUTIONAL:  No fever, good appetite  CARDIOVASCULAR:  No chest pain or palpitations  RESPIRATORY:  No dyspnea  GASTROINTESTINAL:  No nausea, vomiting, diarrhea, or abdominal pain  GENITOURINARY:  No dysuria  NEUROLOGIC:  No headache,     Allergies    Amiodarone Hydrochloride (Other (Severe))    Intolerances        ANTIBIOTICS/RELEVANT:  antimicrobials    immunologic:    OTHER:  chlorhexidine 2% Cloths 1 Application(s) Topical <User Schedule>  fentaNYL    Injectable 12 MICROGram(s) IV Push every 3 hours PRN  guaiFENesin Oral Liquid (Sugar-Free) 200 milliGRAM(s) Oral every 4 hours PRN  insulin lispro (ADMELOG) corrective regimen sliding scale   SubCutaneous every 6 hours  methimazole 15 milliGRAM(s) Oral daily  metoclopramide Injectable 10 milliGRAM(s) IV Push every 8 hours  mirtazapine 7.5 milliGRAM(s) Oral at bedtime  multivitamin 1 Tablet(s) Oral daily  ondansetron Injectable 8 milliGRAM(s) IV Push every 8 hours  pantoprazole  Injectable 40 milliGRAM(s) IV Push every 12 hours  predniSONE   Tablet 4 milliGRAM(s) Oral daily  prochlorperazine   Tablet 10 milliGRAM(s) Oral three times a day  sertraline 100 milliGRAM(s) Oral daily  simethicone 80 milliGRAM(s) Chew every 8 hours PRN  sodium chloride 0.9%. 1000 milliLiter(s) IV Continuous <Continuous>  thiamine 100 milliGRAM(s) Oral daily      Objective:  Vital Signs Last 24 Hrs  T(C): 36.4 (26 Oct 2021 12:00), Max: 36.4 (26 Oct 2021 08:00)  T(F): 97.5 (26 Oct 2021 12:00), Max: 97.5 (26 Oct 2021 08:00)  HR: 112 (26 Oct 2021 17:00) (102 - 128)  BP: --  BP(mean): --  RR: 27 (26 Oct 2021 17:00) (16 - 27)  SpO2: 96% (26 Oct 2021 17:00) (92% - 100%)    PHYSICAL EXAM:  Constitutional:no acute distress  Eyes:ALONSO, EOMI  Ear/Nose/Throat: no oral lesions, 	  Respiratory: clear BL  Cardiovascular: S1S2  Gastrointestinal:soft, (+) BS, no tenderness  Extremities:no e/e/c  No Lymphadenopathy  IV sites not inflammed.    LABS:                        9.1    12.89 )-----------( 253      ( 26 Oct 2021 01:18 )             27.2     10-26    129<L>  |  92<L>  |  5<L>  ----------------------------<  75  3.9   |  21<L>  |  0.55    Ca    9.5      26 Oct 2021 01:18  Phos  2.5     10-26  Mg     1.6     10-26    TPro  7.4  /  Alb  3.7  /  TBili  0.6  /  DBili  x   /  AST  13  /  ALT  13  /  AlkPhos  211<H>  10-26          MICROBIOLOGY:            RECENT CULTURES:  10-23 @ 16:27  .Blood Blood  --  --  --    No growth to date.  --  10-22 @ 22:01  .Blood Blood  --  --  --    No growth to date.  --      RADIOLOGY & ADDITIONAL STUDIES:    < from: Xray Chest 1 View- PORTABLE-Urgent (Xray Chest 1 View- PORTABLE-Urgent .) (10.26.21 @ 08:44) >  IMPRESSION:  Left ventricular assist device in good position with no fractures or voids within connection/tubing.    Enteric tube descends below the level the diaphragm and then coils upon itself within the stomach. The tip of the enteric tube is overlying the right hemiabdomen is likely located in the distal stomach versus proximal duodenum.    < end of copied text >   INFECTIOUS DISEASES FOLLOW UP-- Anitra Prater  [phone]    This is a follow up note for this  66yMale with  Anemia    Acute cholecystitis        ROS:  CONSTITUTIONAL:  No fever, no vomiting for the past day  CARDIOVASCULAR:  No chest pain or palpitations  RESPIRATORY:  No dyspnea  GASTROINTESTINAL:  No nausea, vomiting, diarrhea, or abdominal pain  GENITOURINARY:  No dysuria  NEUROLOGIC:  No headache,     Allergies    Amiodarone Hydrochloride (Other (Severe))    Intolerances        ANTIBIOTICS/RELEVANT:  antimicrobials    immunologic:    OTHER:  chlorhexidine 2% Cloths 1 Application(s) Topical <User Schedule>  fentaNYL    Injectable 12 MICROGram(s) IV Push every 3 hours PRN  guaiFENesin Oral Liquid (Sugar-Free) 200 milliGRAM(s) Oral every 4 hours PRN  insulin lispro (ADMELOG) corrective regimen sliding scale   SubCutaneous every 6 hours  methimazole 15 milliGRAM(s) Oral daily  metoclopramide Injectable 10 milliGRAM(s) IV Push every 8 hours  mirtazapine 7.5 milliGRAM(s) Oral at bedtime  multivitamin 1 Tablet(s) Oral daily  ondansetron Injectable 8 milliGRAM(s) IV Push every 8 hours  pantoprazole  Injectable 40 milliGRAM(s) IV Push every 12 hours  predniSONE   Tablet 4 milliGRAM(s) Oral daily  prochlorperazine   Tablet 10 milliGRAM(s) Oral three times a day  sertraline 100 milliGRAM(s) Oral daily  simethicone 80 milliGRAM(s) Chew every 8 hours PRN  sodium chloride 0.9%. 1000 milliLiter(s) IV Continuous <Continuous>  thiamine 100 milliGRAM(s) Oral daily      Objective:  Vital Signs Last 24 Hrs  T(C): 36.4 (26 Oct 2021 12:00), Max: 36.4 (26 Oct 2021 08:00)  T(F): 97.5 (26 Oct 2021 12:00), Max: 97.5 (26 Oct 2021 08:00)  HR: 112 (26 Oct 2021 17:00) (102 - 128)  BP: --  BP(mean): --  RR: 27 (26 Oct 2021 17:00) (16 - 27)  SpO2: 96% (26 Oct 2021 17:00) (92% - 100%)    PHYSICAL EXAM:  Constitutional:no acute distress  Eyes:ALONSO, EOMI  Ear/Nose/Throat: no oral lesions, 	  Respiratory: clear BL  Cardiovascular: S1S2  Gastrointestinal:soft, (+) BS, no tenderness  cholecystotmy tube with bilious fluid  Extremities:no e/e/c  No Lymphadenopathy  IV sites not inflammed.    LABS:                        9.1    12.89 )-----------( 253      ( 26 Oct 2021 01:18 )             27.2     10-26    129<L>  |  92<L>  |  5<L>  ----------------------------<  75  3.9   |  21<L>  |  0.55    Ca    9.5      26 Oct 2021 01:18  Phos  2.5     10-26  Mg     1.6     10-26    TPro  7.4  /  Alb  3.7  /  TBili  0.6  /  DBili  x   /  AST  13  /  ALT  13  /  AlkPhos  211<H>  10-26          MICROBIOLOGY:            RECENT CULTURES:  10-23 @ 16:27  .Blood Blood  --  --  --    No growth to date.  --  10-22 @ 22:01  .Blood Blood  --  --  --    No growth to date.  --      RADIOLOGY & ADDITIONAL STUDIES:    < from: Xray Chest 1 View- PORTABLE-Urgent (Xray Chest 1 View- PORTABLE-Urgent .) (10.26.21 @ 08:44) >  IMPRESSION:  Left ventricular assist device in good position with no fractures or voids within connection/tubing.    Enteric tube descends below the level the diaphragm and then coils upon itself within the stomach. The tip of the enteric tube is overlying the right hemiabdomen is likely located in the distal stomach versus proximal duodenum.    < end of copied text >

## 2021-10-26 NOTE — PROGRESS NOTE ADULT - SUBJECTIVE AND OBJECTIVE BOX
RICKY JOINT  MRN#: [id number]  Subjective:  Pulmonary progress  : recurrent Acute hypoxic respiratory Failure ,aspiration pneumonia, NICM  ,   65M PMH ACC/AHA stage D HF due to NICM HM2 LVAD , TV annuloplasty ring 17 as destination therapy due to severe peripheral artery disease with significant stenosis  SIADH, Depression, CKD-3 with hyperkalemia, past E. coli UTIs, driveline drainage (21) and COVID-19 (back in 2020)  He was recently seen in clinic where he complained of abdominal pain and dark stools w constipation back in May. He presents to [facility] ER today weakness and fatigue, moderate and + Black stools for three days, on coumadin secondary to warfarin use in the setting of an LVAD. Patient has required transfusions for GIB in the past. Mostly recently back in 2021 pt had anemia with dark stools. No interventions was done at that time. However Last Endoscopy was done in 2020 (negative). Today labs show patient is anemic with H/H of 4.5/16.3,. INR is 8.84 MAP in the 90s, Temp 35.1. He denies any chest pain, shortness of breath, dizziness, abd pain, nausea or vomiting. found to have  rectal bleeding underwent endoscopy ,old blood in the proximal ileum ,  develop sepsis with LL opacity given Antibiotics , Extubated , reintubated , Bronchoscopy on Zosyn for LL pneumonia  and Amiodarone S/P TV Annuloplasty , patient remain intubated on full ventilatory support .S/P multiple units of blood transfusion , remain on full ventilatory support on Precedex and propofol , new central IJ line , diarrhea C diff. +ve on po vancomycin and IV Flagyl,  mildly distended belly , fever start on cefepime 2gm q 8 hrs S/P tracheostomy .  new RT Subclavian central line continue on contact  isolation ,S/P  C-diff antibiotics, no more diarrhea back on full support mechanical ventilator , chest x ray show improvement in LLL air space disease, more awake and responsive on tube feeding no more diarrhea ,  no nausea or vomiting or diarrhea still very weak and tired , back on tube feeding ,still on po vancomycin , getting PT and OT at bed side ,   , no SOB getting stronger , improve muscle tone patient transfer to monitor bed still on contact isolation for C-Difficel colitis on 50% FI02, and change to Suresh  tube feeding still loose stool . H/H drop significantly require blood transfusion , most likely GI bleeding , IV heparin D/C ,  H/H is stable ., patient develop TR sided  pneumothorax require chest tube placement , RT IJ central line  placed , develop fever shaking chills , blood culture positive for serratia marcescens , start on cefepime .the patient  become hypoxic and hypotensive placed on full ventilatory support and Vasopressin , levophed and Dobutamine ,S/P blood transfusion on meropenem and vancomycin ,   , on and  off pressors , occasional agitation on Precedex .S/P IR cholecystostomy tube drainage placement in the RT upper Quadrant , resume anticoagulation chest x ray noted C-PAP trail lasted only for 2 hrs , new RT SC line and D/C RT IJ line , RT pig tail cathter has been removed , tolerating C-PAP trial placed on trach. collar 50% FI02 GI consultant noted on NG tube feeding as tolerated , develop AF RVR S/P  bolus Amiodarone  back to regular sinus Rhythm , Flat Affect depressed , back on tube feeding Vital AF at 60 cc/ hr .still intermittent abdominal pain , no fever saturation is accepted  back on full ventilatory support ,   on methimazole for hyperthyroidism ,  condition is the same ,still C/O Abdominal pain , white count is improving , no chest pain or SOB ,  .placed on Reglan 10 mg TID for gastric motility , depressed , withdrawn. , S/P  mesenteric angiogram , unable to stent SMA S/P 3 units of blood transfusion   RT IJ in place reassessed for stent in the SMA by vascular,  dark stool stable H/H ,surgical opinion for possible Lap cholecystectomy. over night events noted develop respiratory distress, , rectal bleeding, require intubation placed on full ventilatory support , FI02 is 50% also became hemianosmically unstable require triple pressor support with levophed , vasopressin and norsynephrine, pan culture placed  on Vancomycin IV and PO  and Zosyn, sedated with propofol kept NPO , new central line RT and left IJ cathter placed . Diflucan Added .fever of 38.5 weaned off  vasopressin ,   fever adding IV vancomycin and  vancomycin solution  , and Bactrim , S/P  tracheostomy , bleeding receiving blood transfusion on vasopressin , no more bleeding , no fever , more awake and responsive ,tolerating tube feeding , ID and heart failure follow up appreciated , depresses no weaning for now , magnesium is low to give supplement too keep Above 2 , patient S/P  vascular procedure for superior mesenteric artery occlusion S/P 2 stent placement , patient tolerate it very well  , left upper extremities hematoma .vascular follow up appreciated , increase WBC , new RT UPE midline , black tarry stool , very loose R/O recurrent GI bleeding and C-dificile  colitis stable H/H no events over night      (2021 16:57)    PAST MEDICAL & SURGICAL HISTORY:  CHF (congestive heart failure)    CAD (coronary artery disease)  Depression    Pleural effusion    History of 2019 novel coronavirus disease (COVID-19)  2020    Hemorrhoids    Bleeding hemorrhoids    Peripheral arterial disease    Claudication    BPH with urinary obstruction    ACC/AHA stage D systolic heart failure  Anticoagulation goal of INR 2.0 to 2.5    Falls    Clavicle fracture    CKD (chronic kidney disease), stage III    Iron deficiency anemia    H/O epistaxis    Vertigo    GI bleed    S/P TVR (tricuspid valve repair)    S/P ventricular assist device    S/P endoscopy    OBJECTIVE:  ICU Vital Signs Last 24 Hrs  T(C): 38.2 (2021 10:00), Max: 38.5 (2021 12:00)  T(F): 100.8 (2021 10:00), Max: 101.3 (2021 12:00)  HR: 65 (2021 10:00) (61 - 69)  BP: --  BP(mean): --  ABP: 105/67 (2021 10:00) (90/54 - 113/64)  ABP(mean): 77 (2021 10:00) (63 - 77)  RR: 20 (2021 10:00) (19 - 35)  SpO2: 99% (2021 10:00) (96% - 100%)       @ 07:  -   @ 07:00  --------------------------------------------------------  IN: 2693.9 mL / OUT: 1415 mL / NET: 1278.9 mL     @ 07:01  -  06-20 @ 10:49  --------------------------------------------------------  IN: 420.8 mL / OUT: 115 mL / NET: 305.8 mL  PHYSICAL EXAM: Daily   Elderly male  on trach. collar  FI02 is 40% S/P tracheostomy   Daily Weight in k.4 (2021 00:00)  HEENT:     + NCAT  + EOMI  - Conjuctival edema   - Icterus   - Thrush   - ETT  + NGT/OGT  Neck:         + FROM  + JVD  - Nodes - Masses + Mid-line trachea + Tracheostomy  Chest:            normal A-P diameter    Lungs:          + CTA   + Rhonchi    - Rales    - Wheezing + Decreased  LT BS   - Dullness R L  Cardiac:       + S1 + S2    + RRR   - Irregular   - S3  - S4    - Murmurs   - Rub   - Hamman’s sign   Abdomen:    + BS  + Soft + Non-tender - Distended - Organomegaly - PEG .cholecystostomy tube in place  Extremities:   - Cyanosis U/L   - Clubbing  U/L  + LE/UE Edema LUE hematoma   + Capillary refill    + Pulses   Neuro:        - Awake   -  Alert   - Confused   - Lethargic   + Sedated  + Generalized Weakness  Skin:        - Rashes    - Erythema   + Normal incisions   + IV sites intact          HOSPITAL MEDICATIONS: All medications reviewed and analyzed  MEDICATIONS  (STANDING):  amiodarone    Tablet 200 milliGRAM(s) Oral daily  chlorhexidine 0.12% Liquid 15 milliLiter(s) Oral Mucosa every 12 hours  chlorhexidine 2% Cloths 1 Application(s) Topical <User Schedule>  dexmedetomidine Infusion 0.5 MICROgram(s)/kG/Hr (9.81 mL/Hr) IV Continuous <Continuous>  dextrose 50% Injectable 50 milliLiter(s) IV Push every 15 minutes  heparin  Infusion 400 Unit(s)/Hr (12.5 mL/Hr) IV Continuous <Continuous>  Hydromorphone  Injectable 0.5 milliGRAM(s) IV Push once  insulin lispro (ADMELOG) corrective regimen sliding scale   SubCutaneous every 6 hours  pantoprazole  Injectable 40 milliGRAM(s) IV Push every 12 hours  piperacillin/tazobactam IVPB.. 3.375 Gram(s) IV Intermittent every 8 hours  propofol Infusion 20 MICROgram(s)/kG/Min (9.42 mL/Hr) IV Continuous <Continuous>  sodium chloride 0.9% lock flush 3 milliLiter(s) IV Push every 8 hours  sodium chloride 0.9%. 1000 milliLiter(s) (10 mL/Hr) IV Continuous <Continuous>    MEDICATIONS  (PRN):  acetaminophen    Suspension .. 650 milliGRAM(s) Oral every 6 hours PRN Temp greater or equal to 38C (100.4F)    LABS: All Lab data reviewed and analyzed                                   9.1    12.89 )-----------( 253      ( 26 Oct 2021 01:18 )             27.2     10-    129<L>  |  92<L>  |  5<L>  ----------------------------<  75  3.9   |  21<L>  |  0.55    Ca    9.5      26 Oct 2021 01:18  Phos  2.5     10-  Mg     1.6     10-26    TPro  7.4  /  Alb  3.7  /  TBili  0.6  /  DBili  x   /  AST  13  /  ALT  13  /  AlkPhos  211<H>  10-26      Ca    8.5      25 Oct 2021 00:44  Phos  2.3     10-25  Mg     1.6     10-    TPro  6.3  /  Alb  3.2<L>  /  TBili  0.5  /  DBili  x   /  AST  12  /  ALT  11  /  AlkPhos  176<H>  10-25    Ca    8.9      24 Oct 2021 01:10  Phos  2.4     10-24  Mg     1.7     10-    TPro  6.6  /  Alb  3.4  /  TBili  0.8  /  DBili  x   /  AST  13  /  ALT  11  /  AlkPhos  191<H>  10-24                                                                                                                                                                              PTT - ( 2021 04:52 )  PTT:45.2 sec LIVER FUNCTIONS - ( 2021 00:42 )  Alb: 3.4 g/dL / Pro: 6.7 g/dL / ALK PHOS: 213 U/L / ALT: 15 U/L / AST: 24 U/L / GGT: x           RADIOLOGY: - Reviewed and analyzed  , LVAD HM2, CT scan of abdomen reviewed result noted

## 2021-10-26 NOTE — PROGRESS NOTE ADULT - ATTENDING COMMENTS
Patient pulled out NG tube overnight and it was since replaced. He has had no further emesis over past 36 hours. Will continue to slowly advance tube feeds as tolerates. MAPs borderline elevated but labile, if remain high will start metoprolol. Sodium is improving by reducing D5 in intake fluids.

## 2021-10-26 NOTE — PROGRESS NOTE ADULT - ASSESSMENT
Mental status exam: Seen resting in bed in CTU with trach collar. Awake and alert. Oriented x3. Communicating via mouthing words and speaking over trach, moving his hands, shaking head yes/no. Thought content goal directed. No evidence of any psychosis, kenan. Mood "I'm ok." Affect less constricted, smiling at times. Denies symptoms of hopelessness, depression. Denies anxiety. Denies SI/HI.     Psychological assessment: Seen lying in bed in CTU with trach collar. Communicating via mouthing words and talking over trach, moving his hands, shaking head yes/no. As per RN pulled out feeding tube last night. Stated he had "12/10" pain yesterday but today his pain level is 0/10. Mood more upbeat, smiling during assessment. Denies symptoms of hopelessness, depression. Denies anxiety. Denies SI/HI. Affect less constricted. This morning ambulated in hallway with PT at brisk pace. Will speak to PT about providing resistance bands for additional exercise. Receptive to support and validation.     Dx: Adjustment disorder with anxiety and depressed mood. F43.23 Systolic heart failure I50.2  LVAD Z95.811    Recommendations:   If possible, take outside weather permitting   Provide emotional support   Behavioral Cardiology will follow      25 minutes spent on total patient encounter    Jessica Hennessy, PhD  [phone]

## 2021-10-26 NOTE — PROGRESS NOTE ADULT - SUBJECTIVE AND OBJECTIVE BOX
Subjective: Patient seen and examined resting in bed.     Medications:  chlorhexidine 2% Cloths 1 Application(s) Topical <User Schedule>  fentaNYL    Injectable 12 MICROGram(s) IV Push every 3 hours PRN  guaiFENesin Oral Liquid (Sugar-Free) 200 milliGRAM(s) Oral every 4 hours PRN  insulin lispro (ADMELOG) corrective regimen sliding scale   SubCutaneous every 6 hours  methimazole 15 milliGRAM(s) Oral daily  metoclopramide Injectable 10 milliGRAM(s) IV Push every 8 hours  mirtazapine 7.5 milliGRAM(s) Oral daily  multivitamin 1 Tablet(s) Oral daily  ondansetron Injectable 8 milliGRAM(s) IV Push every 8 hours  pantoprazole  Injectable 40 milliGRAM(s) IV Push every 12 hours  predniSONE   Tablet 4 milliGRAM(s) Oral daily  prochlorperazine   Tablet 10 milliGRAM(s) Oral three times a day  sertraline 100 milliGRAM(s) Oral daily  simethicone 80 milliGRAM(s) Chew every 8 hours PRN  sodium chloride 0.9%. 1000 milliLiter(s) IV Continuous <Continuous>  thiamine 100 milliGRAM(s) Oral daily      ICU Vital Signs Last 24 Hrs  T(C): 36.1 (26 Oct 2021 04:00), Max: 36.2 (25 Oct 2021 20:00)  T(F): 97 (26 Oct 2021 04:00), Max: 97.2 (25 Oct 2021 20:00)  HR: 122 (26 Oct 2021 07:00) (101 - 132)  BP: --  BP(mean): --  ABP: 101/76 (26 Oct 2021 06:00) (87/62 - 125/116)  ABP(mean): 88 (26 Oct 2021 06:00) (71 - 119)  RR: 24 (26 Oct 2021 07:00) (16 - 33)  SpO2: 95% (26 Oct 2021 07:00) (92% - 100%)      Weight in k.7 (10- @ 00:00)    I&O's Summary    25 Oct 2021 07:01  -  26 Oct 2021 07:00  --------------------------------------------------------  IN: 2060 mL / OUT: 1675 mL / NET: 385 mL        Physical Exam  General: frail and thin appearing, resting in bed  Neck: +trach collar   Chest: Clear to auscultation bilaterally  CV: +VAD hum  Abdomen: Soft, non-distended, tenderness over epigastric region, +biliary drain  Neurology: Alert and oriented times three.     LVAD Interrogation  VAD TYPE HM 3  Speed 9200  Flow 5.2  Power 5.5  PI  6.2  Assessment of driveline exit site: driveline dressing c/d/i  Programming changes: no changes made    Labs:                        9.1    12.89 )-----------( 253      ( 26 Oct 2021 01:18 )             27.2     10-26    129<L>  |  92<L>  |  5<L>  ----------------------------<  75  3.9   |  21<L>  |  0.55    Ca    9.5      26 Oct 2021 01:18  Phos  2.5     10-26  Mg     1.6     10-26    TPro  7.4  /  Alb  3.7  /  TBili  0.6  /  DBili  x   /  AST  13  /  ALT  13  /  AlkPhos  211<H>  10-26              Lactate Dehydrogenase, Serum: 242 U/L (10-26 @ 01:18)  Lactate Dehydrogenase, Serum: 204 U/L (10-25 @ 00:44)  Lactate Dehydrogenase, Serum: 216 U/L (10-24 @ 01:10)

## 2021-10-26 NOTE — CHART NOTE - NSCHARTNOTEFT_GEN_A_CORE
GOC are already defined and for details please see my GOC note dated 10/21. Furthermore, as per HF notes, the patient is tolerating tube feeds. He is DNR and not to be re-intubated. When it comes to symptoms he is getting minimal PRNs and is on Fentanyl 12mcg q 3h as needed.     Since GOC are defined and symptoms are controlled, I will sign off.         Francisco Burgos MD   Geriatrics and Palliative Care ([facility]AP) Consult Service    of Geriatric and Palliative Medicine  [facility]      Please page the following number for clinical matters between the hours of 9 am and 5 pm from Monday through Friday : [phone]    After 5pm and on weekends, please see the contact information below:    In the event of newly developing, evolving, or worsening symptoms, please contact the Palliative Medicine team via pager (if the patient is at [facility] #[phone] or if the patient is at [facility] #[phone]) The Geriatric and Palliative Medicine service has coverage 24 hours a day/ 7 days a week to provide medical recommendations regarding symptom management needs via telephone

## 2021-10-26 NOTE — PROGRESS NOTE ADULT - PROBLEM SELECTOR PLAN 7
- Has been transfused multiple times throughout this admission, last transfusion was on 10/23  -Antiplatelet has been discontinued for bleeding. Would favor at least 1 antiplatelet agent given fresh stent however defer to vascular

## 2021-10-26 NOTE — PROGRESS NOTE ADULT - ATTENDING COMMENTS
Hyponatremia: chronic, intermittent, asymptomatic. Does not appear volume overloaded  labs consistent with true hyponatremia and SIADH in the setting of use of IV D5w, SSRI and pain  Pt. now off of IV bactrim (was receiving it in D5w)  sodium level improved today  will hold off on addition of UreNa at this time.   Avoid hypotonic fluids.     Melissa Chin MD  Cell : [phone]  Pager : [phone]  Office : [phone]

## 2021-10-26 NOTE — PROGRESS NOTE ADULT - SUBJECTIVE AND OBJECTIVE BOX
Behavioral Cardiology Progress Note     History of present illness: Mr. Quispe is a 66 year-old man with history of NICM s/p HM2 on 9/2017 as DT (due to severe PAD) with TV ring, prior COVID-19 infection 4/2020, recurrent syncope post LVAD s/p ILR, and chronic abdominal pain with prior negative workup who was admitted with symptomatic anemia with Hgb 4.5 in setting of INR 8.8 without hemodynamic instability. Testing showed active bleeding in the small bowel but subsequent enteroscopy 6/15 did not reveal any active bleeding. He acutely decompensated after procedure with fever/hypertension, low flow alarms, and pulmonary infiltrate with hypoxia requiring intubation from probable aspiration PNA.  Course notable for inability to wean ventilator with persistent secretions for which he underwent tracheostomy as well as acute c diff colitis.     Social history: , lives in his sister’s house in [place]. Has 3 sons (2 live in , 1 in [facility]). One of 3 siblings. Lives in his sister’s house in [place] (Cristina Rudolph [phone]), also in the home are niece ([facility] [phone]) and nephew (Miller Rudolph).  Another sister lives close by in [place] and all other siblings live in [facility] which the family visit often.  Worked full time at [employer] in [place], stopped working due to heart disease. Education: high school graduate.     Substance use:   Tobacco: Former smoker, 8-10 cigarettes/day for 30 years.   Alcohol: Past use of 3-4 drinks of Johnson 2x/week. None for past 4 years.   Drug: Denies any drug use.

## 2021-10-26 NOTE — SPEAKING VALVE EVALUATION - DIAGNOSTIC IMPRESSIONS
Pt seen for passy-darren speaking valve evaluation. Pt maintained on 35% FIO2 via trach collar. Low pressure valve placed on hub of trach. Patient was dysphonic with hoarse vocal quality. Pt tolerated valve with VSS stable throughout assessment. No signs of respiratory distress. No increased work of breathing. No subjective complaints. No signs of air trapping. Valve removed after 50 minutes.

## 2021-10-26 NOTE — PROGRESS NOTE ADULT - ASSESSMENT
65 YO M with a history of stage D NICM s/p HM2 on 9/2017 as DT (due to severe PAD) with TV ring, prior COVID-19 infection 4/2020, recurrent syncope post LVAD s/p ILR, and chronic abdominal pain with prior negative workup who was admitted 6/14/21 with symptomatic anemia with Hgb 4.5 in setting of INR 8.8 without hemodynamic instability and has since had a protracted hospitalization. He was transfused and underwent VCE which showed active bleeding in the mid small bowel but subsequent enteroscopy 6/15 did not reveal any active bleeding. He acutely decompensated after procedure with fever/hypertension, low flow alarms, and pulmonary infiltrate with hypoxia requiring intubation from probable aspiration PNA. He was unable to extubated and has since undergone tracheostomy but tolerating persistent trach collar and nearing ability for decannulation. 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 He underwent angiogram on 9/10, stent was unable to be placed and course was then complicated by RP bleed. He continued to have persistent leukocytosis and febrile episodes and was noted to have positive sputum culture for serratia marcescens and completed his course of abx. He was initially decannulated on 9/23. Recently he started having multiples of melena, emesis and went into acte respiratory distress and was ultimately re-intubated on 9/26.     He had blood cultures are positive for enterobacter cloacae/serratia, remains on IV antibiotics. He is s/p trach with ENT on 10/4. Sputum cultures positive for stenotrophomonas and blood cultures positive for serratia on bactrim. S/p SMA stent x 2 10/18.   Early evidence of clearing of serratia bacteremia  completing 10 days of IV bactrim and would discotininue in the setting of low sodium from fluid requirements with bactrim    Morris Prater MD  [phone]  After 5pm/weekends [phone]   67 YO M with a history of stage D NICM s/p HM2 on 9/2017 as DT (due to severe PAD) with TV ring, prior COVID-19 infection 4/2020, recurrent syncope post LVAD s/p ILR, and chronic abdominal pain with prior negative workup who was admitted 6/14/21 with symptomatic anemia with Hgb 4.5 in setting of INR 8.8 without hemodynamic instability and has since had a protracted hospitalization. He was transfused and underwent VCE which showed active bleeding in the mid small bowel but subsequent enteroscopy 6/15 did not reveal any active bleeding. He acutely decompensated after procedure with fever/hypertension, low flow alarms, and pulmonary infiltrate with hypoxia requiring intubation from probable aspiration PNA. He was unable to extubated and has since undergone tracheostomy but tolerating persistent trach collar and nearing ability for decannulation. 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 He underwent angiogram on 9/10, stent was unable to be placed and course was then complicated by RP bleed. He continued to have persistent leukocytosis and febrile episodes and was noted to have positive sputum culture for serratia marcescens and completed his course of abx. He was initially decannulated on 9/23. Recently he started having multiples of melena, emesis and went into acte respiratory distress and was ultimately re-intubated on 9/26.     He had blood cultures are positive for enterobacter cloacae/serratia, remains on IV antibiotics. He is s/p trach with ENT on 10/4. Sputum cultures positive for stenotrophomonas and blood cultures positive for serratia on bactrim. S/p SMA stent x 2 10/18.   Completed ten days of IV Bactrim    Morris Prater MD  [phone]  After 5pm/weekends [phone]

## 2021-10-26 NOTE — PROGRESS NOTE ADULT - PROBLEM SELECTOR PLAN 1
- ACC/AHA stage D systolic heart failure s/p LVAD   - Noted to have slightly elevated MAPs today, likely related to agitation (goal 70-80). Will continue to monitor at this time and if MAPs remain elevated will resume propanolol.

## 2021-10-26 NOTE — PROGRESS NOTE ADULT - SUBJECTIVE AND OBJECTIVE BOX
RICKY HAMPTON  MRN-[id number]  Patient is a 66y old  Male who presents with a chief complaint of Anemia, Supratherapeutic INR, Dark Stools (26 Oct 2021 07:37)    HPI:  65M PMH ACC/AHA stage D HF due to NICM HM2 LVAD , TV annuloplasty ring 17 as destination therapy due to severe peripheral artery disease with significant stenosis  SIADH, Depression, CKD-3 with hyperkalemia, past E. coli UTIs, driveline drainage (21) and COVID-19 (back in 2020)  He was recently seen in clinic where he complained of abdominal pain and dark stools w constipation back in May. He presents to [facility] ER today weakness and fatigue, moderate and + Black stools for three days, on coumadin secondary to warfarin use in the setting of an LVAD. Patient has required transfusions for GIB in the past. Mostly recently back in 2021 pt had anemia with dark stools. No interventions was done at that time. However Last Endoscopy was done in 2020 (negative). Today labs show patient is anemic with H/H of 4.5/16.3,. INR is 8.84 MAP in the 90s, Temp 35.1. He denies any chest pain, shortness of breath, dizziness, abd pain, nausea or vomiting.       (2021 16:57)      Surgery/Hospital Course:   admit for melena w/ anemia, INR 8.84   6/15 Capsul study (+) for small bowel bleed, balloon endoscopy (old blood in prox ileum); post EGD - septic w/ L opacity, re-intubated for concern for aspiration, TTE (Mod MR, decrease biV w/ interventricular septum boweing towards R)   bronch    +C Diff    CT C/A/P: Fluid filled colon which may be 2/2 rapid transit. Small bilateral pleffs with associates. Compressive atelectasis New ISABELLE & LLL  parenchymal opacities, suspicious for pneumonia. Moderate stenosis in the proximal superior mesenteric artery.    #8 Shiley trach at bedside    LVAD speed increased to 9200   Bronch   TC since . Patient transferred to SDU.    INR today 2.64.  H&H 7.3/24 this AM.  Will repeat CBC at noon, and will send stool guaiac Patient with persistent abdominal tenderness, rate of tube feeds decreased.  No nausea/vomiting.     INR today 2.4. H&H 9.1/28.6 low flow overnight /N&V, refusing Tube feeds on D5 1/2 normal  @50 cc/hr   INR 2.69  H&H 7.7/.1 refusing Tube feeds on D5 1/2 normal  @50 cc/hr. This am + BM Melena Dr Oneill HF  aware- PRBC x1  GI team consulted -  NPO  plan on study in am-  D/w Dr Cadet Patient  to return to CTU for further management; 1PRBCS    Post op INR 2.2 today.  No bleeding. BC + for SM.  Pt is hypotensive requiring pressor and inotropic support.  ID follow up today on Cefepime will follow.   R PTC for PTX    CT C/A/P: sub q emphysema in R chest wall, GGO RUL, small ascites CTH negative; Abd US: GB thickening, pericholecystic fluid     Perchole drain in place continues to drain total output overnight 133.  Fever today 38.8    duplex LE negative    Patient with persistent abdominal pain, refusing tube feeds and medications, Psych consulted   CTA A/P ordered to r/o mesenteric ischemia 2/2 persistent anorexia, nausea, vomiting. Revealed:  Evaluation of the mesenteric vessels is limited by streak artifact from LVAD. There appears to be severe stenosis of the proximal SMA; abdominal mesenteric doppler is recommended for further evaluation. 2.  No small bowel findings to suggest acute mesenteric ischemia. 3.  Focal dissections involving the right and left common iliac arteries.  8/15: Cultures resulted BC 1/2 +GPC in clusters, SC enterobacter; mesenteric duplex: borderline stenosis of proximal SMA  : CT C/A/P noncon: Nondular opacities in R lung apex w/cavitation, abd nl  :  Continue current care, treatment of thyrotoxicosis with medications as per endocrine, d/c ABX as per team.    RUQ sono: Contracted gallbladder with cholecystostomy tube in place.  9/10 failed SMA stent, c/b RP bleed s/p 3U PRCB   CTA Abd/Pelvis L RP hematoma    Trach decannulated    intubated fro resp distress for increased WOB, LL pneumonia; CT C/A/P: progressive ISABELLE opacities and L consolidations, multifocal pneumonia    TTE (EF 25%, decreased RV, mod MR)   10/3 VT -> Lidocaine gtt   10/4 Trach size 6 DCT cuff  10/5 CT abd (neg for intra-abd abcess, severe stenosis of prox SMA and b/l iliac arteries)  10/18 SMA Stent   10/23 Emend for nausea   10/24 +Occult    Today:    REVIEW OF SYSTEMS:  Speak over trach   Gen: No fever  EYES/ENT: No visual changes;  No vertigo or throat pain   NECK: No pain   RES:  No shortness of breath or Cough  CARD: No chest pain   GI: +intermittently c/o abd pain and nausea   : No dysuria  NEURO: No weakness  SKIN: No itching, rashes     ICU Vital Signs Last 24 Hrs  T(C): 36.1 (26 Oct 2021 04:00), Max: 36.2 (25 Oct 2021 20:00)  T(F): 97 (26 Oct 2021 04:00), Max: 97.2 (25 Oct 2021 20:00)  HR: 122 (26 Oct 2021 07:00) (101 - 132)  BP: --  BP(mean): --  ABP: 101/76 (26 Oct 2021 06:00) (87/62 - 125/116)  ABP(mean): 88 (26 Oct 2021 06:00) (71 - 119)  RR: 24 (26 Oct 2021 07:00) (16 - 33)  SpO2: 95% (26 Oct 2021 07:00) (92% - 100%)      Physical Exam:  Gen:  NAD  CNS: moves all extremities to command   Neck: no JVD, +trach, +central line   RES : coarse breath sounds, no wheezing    CVS: +LVAD hum   Abd: Soft. Sybil drain with bilious fluid. Positive BS throughout. Mild RUQ abdominal tenderness by perc sybil.  Skin: No rash, erythema, cyanosis.  Vasc: Warm and well-perfused.  Ext:  no edema    ============================I/O===========================   I&O's Detail    25 Oct 2021 07:01  -  26 Oct 2021 07:00  --------------------------------------------------------  IN:    dextrose 5% + sodium chloride 0.9%: 50 mL    IV PiggyBack: 1100 mL    IV PiggyBack: 100 mL    Miscellaneous Tube Feedin mL    sodium chloride 0.9%: 150 mL  Total IN: 2060 mL    OUT:    Drain (mL): 625 mL    Voided (mL): 1050 mL  Total OUT: 1675 mL    Total NET: 385 mL        ============================ LABS =========================                        9.1    12.89 )-----------( 253      ( 26 Oct 2021 01:18 )             27.2     10-26    129<L>  |  92<L>  |  5<L>  ----------------------------<  75  3.9   |  21<L>  |  0.55    Ca    9.5      26 Oct 2021 01:18  Phos  2.5     10-26  Mg     1.6     10-26    TPro  7.4  /  Alb  3.7  /  TBili  0.6  /  DBili  x   /  AST  13  /  ALT  13  /  AlkPhos  211<H>  10-26    LIVER FUNCTIONS - ( 26 Oct 2021 01:18 )  Alb: 3.7 g/dL / Pro: 7.4 g/dL / ALK PHOS: 211 U/L / ALT: 13 U/L / AST: 13 U/L / GGT: x             ABG - ( 26 Oct 2021 00:51 )  pH, Arterial: 7.40  pH, Blood: x     /  pCO2: 39    /  pO2: 92    / HCO3: 24    / Base Excess: -0.5  /  SaO2: 99.6                ======================Micro/Rad/Cardio=================  Culture: Reviewed   CXR: Reviewed  Echo:Reviewed  ======================================================  PAST MEDICAL & SURGICAL HISTORY:  CHF (congestive heart failure)    CAD (coronary artery disease)    Depression    Pleural effusion    History of  novel coronavirus disease (COVID-19)  2020    Hemorrhoids    Bleeding hemorrhoids    Peripheral arterial disease    Claudication    BPH with urinary obstruction    ACC/AHA stage D systolic heart failure    Anticoagulation goal of INR 2.0 to 2.5    Falls    Clavicle fracture    CKD (chronic kidney disease), stage III    Iron deficiency anemia    H/O epistaxis    Vertigo    GI bleed    S/P TVR (tricuspid valve repair)    S/P ventricular assist device    S/P endoscopy      ====================ASSESSMENT ==============  Stage D Nonischemic Cardiomyopathy, Status Post HM2 on 2017    Cardiogenic shock  Hemodynamic instability   Acute hypoxemic respiratory failure s/p trach , decannulated on ; Reintubated on    GI bleed , Status Post Enteroscopy   Anemia, in setting of melena   Chronic Kidney Disease  Stress hyperglycemia   C.diff positive on    Hypovolemic shock  Septic shock  Leukocytosis  GB thickening/percholecystic s/p perc sybil by IR   SMA stenosis s/p SMA Stent on 10/18   Serratia/citrobacter pneumonia   Stenotrophomonas pneumonia   Enterobacter pneumonia   Nasuea/vomiting  Deconditioning  Hyponatremia    DNR / No vent     Plan:  ====================== NEUROLOGY=====================  Continue close monitoring of neuro status   C/w sertraline, prochlorperazine, and mirtazapine for depression   Fentanyl PRN for pain      fentaNYL    Injectable 12 MICROGram(s) IV Push every 3 hours PRN Moderate to severe pain  mirtazapine 7.5 milliGRAM(s) Oral daily  prochlorperazine   Tablet 10 milliGRAM(s) Oral three times a day  sertraline 100 milliGRAM(s) Oral daily    ==================== RESPIRATORY======================  Acute hypoxemic respiratory failure s/p #8 shiley trach on ; decannulated on ; Reintubated on ; trach size 6 DCT cuff on 10/4   Continue close monitoring of respiratory rate and breathing pattern, following of ABG’s with Janet monitoring, continuous pulse oximetry monitoring.   Tolerating TC since 10/19, continue as tolerated   Guaifenesin PO PRN mucous plugging     guaiFENesin Oral Liquid (Sugar-Free) 200 milliGRAM(s) Oral every 4 hours PRN mucous plugging    ====================CARDIOVASCULAR==================  Stage D Nonischemic Cardiomyopathy, Status Post HM2 on 2017; LVAD settings 9200, flow 5.4  TTE 10/4:  Severely decreased LV systolic function, Diffuse hypokinesis. Mild AR. No pericardial effusion   VT on 10/4, s/p Lidocaine drip, continue close tele monitoring   ASA/Plavix for recent SMA stent on hold due to drop in h/h and +occult feces on 10/24    ===================HEMATOLOGIC/ONC ===================  CTA A/P on  +L RP hematoma   Acute blood loss anemia, monitor H&H/Plts   LUE US on 10/20 with no pseudoaneurysm  +occult feces on 10/24     ===================== RENAL =========================  Persistent hyponatremia, now rising 126->129, currently on  NS+D5 @ 30 ml/h,    As per renal will consider UreNa, if sodium dose not improve   Hx of CKD, continue monitoring urine output, I&OS, BUN/Cr   Replete lytes PRN. Keep K> 4 and Mg >2    ==================== GASTROINTESTINAL===================  TFs held this AM   CT A/P 10/5 neg for intra-abd abcess, severe stenosis of prox SMA and b/l iliac arteries  CTA A/P : Evaluation of the mesenteric vessels is limited by streak artifact from LVAD. There appears to be severe stenosis of the proximal SMA; abdominal mesenteric doppler is recommended for further evaluation. 2.  No small bowel findings to suggest acute mesenteric ischemia. 3.  Focal dissections involving the right and left common iliac arteries.  Stenosis of proximal SMA, s/p failed SMA stent c/b RP bleed on 9/10 - SMA stent with Vascular 10/18   Simethicone PRN for gas   Reglan for gut motility  Zofran for nausea   +occult feces on 10/24     multivitamin 1 Tablet(s) Oral daily  GI prophylaxis, pantoprazole  Injectable 40 milliGRAM(s) IV Push every 12 hours  simethicone 80 milliGRAM(s) Chew every 8 hours PRN Gas  sodium chloride 0.9%. 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   CT C/A/P : progressive ISABELLE opacities and L consolidations, multifocal pneumonia  BCx 1/2 on 10/1 +Enterobacter cloacae complex, BCx  10/14+ Serratia marcescens   BCx 10/14 and SCX on 10/14 +Stenotrophomonas maltophilia s/p Bactrim   BCx 10/22 and 10/23 NGTD         Patient requires continuous monitoring with bedside rhythm monitoring, pulse ox monitoring, and intermittent blood gas analysis. Care plan discussed with ICU care team. Patient remained critical and at risk for life threatening decompensation.     By signing my name below, I, Agnieszka Cherry, attest that this documentation has been prepared under the direction and in the presence of Jaclyn Mendoza NP   Electronically signed: Romel Shaffer, 10-26-21 @ 07:56    I, Jaclyn Mendoza, personally performed the services described in this documentation. all medical record entries made by the romel were at my direction and in my presence. I have reviewed the chart and agree that the record reflects my personal performance and is accurate and complete  Electronically signed: Jaclyn Mendoza NP        RICKY HAMPTON  MRN-[id number]  Patient is a 66y old  Male who presents with a chief complaint of Anemia, Supratherapeutic INR, Dark Stools (26 Oct 2021 07:37)    HPI:  65M PMH ACC/AHA stage D HF due to NICM HM2 LVAD , TV annuloplasty ring 17 as destination therapy due to severe peripheral artery disease with significant stenosis  SIADH, Depression, CKD-3 with hyperkalemia, past E. coli UTIs, driveline drainage (21) and COVID-19 (back in 2020)  He was recently seen in clinic where he complained of abdominal pain and dark stools w constipation back in May. He presents to [facility] ER today weakness and fatigue, moderate and + Black stools for three days, on coumadin secondary to warfarin use in the setting of an LVAD. Patient has required transfusions for GIB in the past. Mostly recently back in 2021 pt had anemia with dark stools. No interventions was done at that time. However Last Endoscopy was done in 2020 (negative). Today labs show patient is anemic with H/H of 4.5/16.3,. INR is 8.84 MAP in the 90s, Temp 35.1. He denies any chest pain, shortness of breath, dizziness, abd pain, nausea or vomiting.       (2021 16:57)      Surgery/Hospital Course:   admit for melena w/ anemia, INR 8.84   6/15 Capsul study (+) for small bowel bleed, balloon endoscopy (old blood in prox ileum); post EGD - septic w/ L opacity, re-intubated for concern for aspiration, TTE (Mod MR, decrease biV w/ interventricular septum boweing towards R)   bronch    +C Diff    CT C/A/P: Fluid filled colon which may be 2/2 rapid transit. Small bilateral pleffs with associates. Compressive atelectasis New ISABELLE & LLL  parenchymal opacities, suspicious for pneumonia. Moderate stenosis in the proximal superior mesenteric artery.    #8 Shiley trach at bedside    LVAD speed increased to 9200   Bronch   TC since . Patient transferred to SDU.    INR today 2.64.  H&H 7.3/24 this AM.  Will repeat CBC at noon, and will send stool guaiac Patient with persistent abdominal tenderness, rate of tube feeds decreased.  No nausea/vomiting.     INR today 2.4. H&H 9.1/28.6 low flow overnight /N&V, refusing Tube feeds on D5 1/2 normal  @50 cc/hr   INR 2.69  H&H 7.7/.1 refusing Tube feeds on D5 1/2 normal  @50 cc/hr. This am + BM Melena Dr Oneill HF  aware- PRBC x1  GI team consulted -  NPO  plan on study in am-  D/w Dr Cadet Patient  to return to CTU for further management; 1PRBCS    Post op INR 2.2 today.  No bleeding. BC + for SM.  Pt is hypotensive requiring pressor and inotropic support.  ID follow up today on Cefepime will follow.   R PTC for PTX    CT C/A/P: sub q emphysema in R chest wall, GGO RUL, small ascites CTH negative; Abd US: GB thickening, pericholecystic fluid     Perchole drain in place continues to drain total output overnight 133.  Fever today 38.8    duplex LE negative    Patient with persistent abdominal pain, refusing tube feeds and medications, Psych consulted   CTA A/P ordered to r/o mesenteric ischemia 2/2 persistent anorexia, nausea, vomiting. Revealed:  Evaluation of the mesenteric vessels is limited by streak artifact from LVAD. There appears to be severe stenosis of the proximal SMA; abdominal mesenteric doppler is recommended for further evaluation. 2.  No small bowel findings to suggest acute mesenteric ischemia. 3.  Focal dissections involving the right and left common iliac arteries.  8/15: Cultures resulted BC 1/2 +GPC in clusters, SC enterobacter; mesenteric duplex: borderline stenosis of proximal SMA  : CT C/A/P noncon: Nondular opacities in R lung apex w/cavitation, abd nl  :  Continue current care, treatment of thyrotoxicosis with medications as per endocrine, d/c ABX as per team.    RUQ sono: Contracted gallbladder with cholecystostomy tube in place.  9/10 failed SMA stent, c/b RP bleed s/p 3U PRCB   CTA Abd/Pelvis L RP hematoma    Trach decannulated    intubated fro resp distress for increased WOB, LL pneumonia; CT C/A/P: progressive ISABELLE opacities and L consolidations, multifocal pneumonia    TTE (EF 25%, decreased RV, mod MR)   10/3 VT -> Lidocaine gtt   10/4 Trach size 6 DCT cuff  10/5 CT abd (neg for intra-abd abcess, severe stenosis of prox SMA and b/l iliac arteries)  10/18 SMA Stent   10/23 Emend for nausea   10/24 +Occult    Today:  TFs held briefly this AM for new NG tube placed, will restart feeds and increase back to 35cc/hr as tolerated. Tolerating TC since 10/19, continue as tolerated. Ambulate as tolerated.     REVIEW OF SYSTEMS:  Speak over trach   Gen: No fever  EYES/ENT: No visual changes;  No vertigo or throat pain   NECK: No pain   RES:  No shortness of breath or Cough  CARD: No chest pain   GI: +intermittently c/o abd pain and nausea   : No dysuria  NEURO: No weakness  SKIN: No itching, rashes     ICU Vital Signs Last 24 Hrs  T(C): 36.1 (26 Oct 2021 04:00), Max: 36.2 (25 Oct 2021 20:00)  T(F): 97 (26 Oct 2021 04:00), Max: 97.2 (25 Oct 2021 20:00)  HR: 122 (26 Oct 2021 07:00) (101 - 132)  BP: --  BP(mean): --  ABP: 101/76 (26 Oct 2021 06:00) (87/62 - 125/116)  ABP(mean): 88 (26 Oct 2021 06:00) (71 - 119)  RR: 24 (26 Oct 2021 07:00) (16 - 33)  SpO2: 95% (26 Oct 2021 07:00) (92% - 100%)      Physical Exam:  Gen:  NAD  CNS: moves all extremities to command   Neck: no JVD, +trach, +central line   RES : coarse breath sounds, no wheezing    CVS: +LVAD hum   Abd: Soft. Sybil drain with bilious fluid. Positive BS throughout. Mild RUQ abdominal tenderness by perc sybil.  Skin: No rash, erythema, cyanosis.  Vasc: Warm and well-perfused.  Ext:  no edema    ============================I/O===========================   I&O's Detail    25 Oct 2021 07:01  -  26 Oct 2021 07:00  --------------------------------------------------------  IN:    dextrose 5% + sodium chloride 0.9%: 50 mL    IV PiggyBack: 1100 mL    IV PiggyBack: 100 mL    Miscellaneous Tube Feedin mL    sodium chloride 0.9%: 150 mL  Total IN: 2060 mL    OUT:    Drain (mL): 625 mL    Voided (mL): 1050 mL  Total OUT: 1675 mL    Total NET: 385 mL        ============================ LABS =========================                        9.1    12.89 )-----------( 253      ( 26 Oct 2021 01:18 )             27.2     10-    129<L>  |  92<L>  |  5<L>  ----------------------------<  75  3.9   |  21<L>  |  0.55    Ca    9.5      26 Oct 2021 01:18  Phos  2.5     10-26  Mg     1.6     10-26    TPro  7.4  /  Alb  3.7  /  TBili  0.6  /  DBili  x   /  AST  13  /  ALT  13  /  AlkPhos  211<H>  10-26    LIVER FUNCTIONS - ( 26 Oct 2021 01:18 )  Alb: 3.7 g/dL / Pro: 7.4 g/dL / ALK PHOS: 211 U/L / ALT: 13 U/L / AST: 13 U/L / GGT: x             ABG - ( 26 Oct 2021 00:51 )  pH, Arterial: 7.40  pH, Blood: x     /  pCO2: 39    /  pO2: 92    / HCO3: 24    / Base Excess: -0.5  /  SaO2: 99.6                ======================Micro/Rad/Cardio=================  Culture: Reviewed   CXR: Reviewed  Echo:Reviewed  ======================================================  PAST MEDICAL & SURGICAL HISTORY:  CHF (congestive heart failure)    CAD (coronary artery disease)    Depression    Pleural effusion    History of  novel coronavirus disease (COVID-19)  2020    Hemorrhoids    Bleeding hemorrhoids    Peripheral arterial disease    Claudication    BPH with urinary obstruction    ACC/AHA stage D systolic heart failure    Anticoagulation goal of INR 2.0 to 2.5    Falls    Clavicle fracture    CKD (chronic kidney disease), stage III    Iron deficiency anemia    H/O epistaxis    Vertigo    GI bleed    S/P TVR (tricuspid valve repair)    S/P ventricular assist device    S/P endoscopy      ====================ASSESSMENT ==============  Stage D Nonischemic Cardiomyopathy, Status Post HM2 on 2017    Cardiogenic shock  Hemodynamic instability   Acute hypoxemic respiratory failure s/p trach , decannulated on ; Reintubated on    GI bleed , Status Post Enteroscopy   Anemia, in setting of melena   Chronic Kidney Disease  Stress hyperglycemia   C.diff positive on    Hypovolemic shock  Septic shock  Leukocytosis  GB thickening/percholecystic s/p perc sybil by IR   SMA stenosis s/p SMA Stent on 10/18   Serratia/citrobacter pneumonia   Stenotrophomonas pneumonia   Enterobacter pneumonia   Nasuea/vomiting  Deconditioning  Hyponatremia    DNR / No vent     Plan:  ====================== NEUROLOGY=====================  Continue close monitoring of neuro status   C/w sertraline, prochlorperazine, and mirtazapine for depression   Fentanyl PRN for pain      fentaNYL    Injectable 12 MICROGram(s) IV Push every 3 hours PRN Moderate to severe pain  mirtazapine 7.5 milliGRAM(s) Oral daily  prochlorperazine   Tablet 10 milliGRAM(s) Oral three times a day  sertraline 100 milliGRAM(s) Oral daily    ==================== RESPIRATORY======================  Acute hypoxemic respiratory failure s/p #8 shiley trach on ; decannulated on ; Reintubated on ; trach size 6 DCT cuff on 10/4   Continue close monitoring of respiratory rate and breathing pattern, following of ABG’s with Janet monitoring, continuous pulse oximetry monitoring.   Tolerating TC since 10/19, continue as tolerated   Guaifenesin PO PRN mucous plugging     guaiFENesin Oral Liquid (Sugar-Free) 200 milliGRAM(s) Oral every 4 hours PRN mucous plugging    ====================CARDIOVASCULAR==================  Stage D Nonischemic Cardiomyopathy, Status Post HM2 on 2017; LVAD settings 9200, flow 5.4  TTE 10/4:  Severely decreased LV systolic function, Diffuse hypokinesis. Mild AR. No pericardial effusion   VT on 10/4, s/p Lidocaine drip, continue close tele monitoring   ASA/Plavix for recent SMA stent on hold due to drop in h/h and +occult feces on 10/24    ===================HEMATOLOGIC/ONC ===================  CTA A/P on  +L RP hematoma   Acute blood loss anemia, monitor H&H/Plts   LUE US on 10/20 with no pseudoaneurysm  +occult feces on 10/24     ===================== RENAL =========================  Persistent hyponatremia, now rising 126->129, currently on  NS+D5 @ 30 ml/h,    As per renal will consider UreNa, if sodium dose not improve   Hx of CKD, continue monitoring urine output, I&OS, BUN/Cr   Replete lytes PRN. Keep K> 4 and Mg >2    ==================== GASTROINTESTINAL===================  TFs held briefly this AM for new NG tube placed, will restart feeds and increase back to 35cc/hr as tolerated   CT A/P 10/5 neg for intra-abd abcess, severe stenosis of prox SMA and b/l iliac arteries  CTA A/P : Evaluation of the mesenteric vessels is limited by streak artifact from LVAD. There appears to be severe stenosis of the proximal SMA; abdominal mesenteric doppler is recommended for further evaluation. 2.  No small bowel findings to suggest acute mesenteric ischemia. 3.  Focal dissections involving the right and left common iliac arteries.  Stenosis of proximal SMA, s/p failed SMA stent c/b RP bleed on 9/10 - SMA stent with Vascular 10/18   Simethicone PRN for gas   Reglan for gut motility  Zofran for nausea   +occult feces on 10/24     multivitamin 1 Tablet(s) Oral daily  GI prophylaxis, pantoprazole  Injectable 40 milliGRAM(s) IV Push every 12 hours  simethicone 80 milliGRAM(s) Chew every 8 hours PRN Gas  sodium chloride 0.9%. 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   CT C/A/P : progressive ISABELLE opacities and L consolidations, multifocal pneumonia  BCx 1/2 on 10/1 +Enterobacter cloacae complex, BCx  10/14+ Serratia marcescens   BCx 10/14 and SCX on 10/14 +Stenotrophomonas maltophilia s/p Bactrim   BCx 10/22 and 10/23 NGTD         Patient requires continuous monitoring with bedside rhythm monitoring, pulse ox monitoring, and intermittent blood gas analysis. Care plan discussed with ICU care team. Patient remained critical and at risk for life threatening decompensation.     By signing my name below, I, Agnieszka Cherry, attest that this documentation has been prepared under the direction and in the presence of Jaclyn Mendoza NP   Electronically signed: Romel Shaffer, 10-26-21 @ 07:56    I, Jaclyn Mendoza, personally performed the services described in this documentation. all medical record entries made by the romle were at my direction and in my presence. I have reviewed the chart and agree that the record reflects my personal performance and is accurate and complete  Electronically signed: Jaclyn Mendoza NP        RICKY HAMPTON  MRN-[id number]  Patient is a 66y old  Male who presents with a chief complaint of Anemia, Supratherapeutic INR, Dark Stools (26 Oct 2021 07:37)    HPI:  65M PMH ACC/AHA stage D HF due to NICM HM2 LVAD , TV annuloplasty ring 17 as destination therapy due to severe peripheral artery disease with significant stenosis  SIADH, Depression, CKD-3 with hyperkalemia, past E. coli UTIs, driveline drainage (21) and COVID-19 (back in 2020)  He was recently seen in clinic where he complained of abdominal pain and dark stools w constipation back in May. He presents to [facility] ER today weakness and fatigue, moderate and + Black stools for three days, on coumadin secondary to warfarin use in the setting of an LVAD. Patient has required transfusions for GIB in the past. Mostly recently back in 2021 pt had anemia with dark stools. No interventions was done at that time. However Last Endoscopy was done in 2020 (negative). Today labs show patient is anemic with H/H of 4.5/16.3,. INR is 8.84 MAP in the 90s, Temp 35.1. He denies any chest pain, shortness of breath, dizziness, abd pain, nausea or vomiting.       (2021 16:57)      Surgery/Hospital Course:   admit for melena w/ anemia, INR 8.84   6/15 Capsul study (+) for small bowel bleed, balloon endoscopy (old blood in prox ileum); post EGD - septic w/ L opacity, re-intubated for concern for aspiration, TTE (Mod MR, decrease biV w/ interventricular septum boweing towards R)   bronch    +C Diff    CT C/A/P: Fluid filled colon which may be 2/2 rapid transit. Small bilateral pleffs with associates. Compressive atelectasis New ISABELLE & LLL  parenchymal opacities, suspicious for pneumonia. Moderate stenosis in the proximal superior mesenteric artery.    #8 Shiley trach at bedside    LVAD speed increased to 9200   Bronch   TC since . Patient transferred to SDU.    INR today 2.64.  H&H 7.3/24 this AM.  Will repeat CBC at noon, and will send stool guaiac Patient with persistent abdominal tenderness, rate of tube feeds decreased.  No nausea/vomiting.     INR today 2.4. H&H 9.1/28.6 low flow overnight /N&V, refusing Tube feeds on D5 1/2 normal  @50 cc/hr   INR 2.69  H&H 7.7/.1 refusing Tube feeds on D5 1/2 normal  @50 cc/hr. This am + BM Melena Dr Oneill HF  aware- PRBC x1  GI team consulted -  NPO  plan on study in am-  D/w Dr Cadet Patient  to return to CTU for further management; 1PRBCS    Post op INR 2.2 today.  No bleeding. BC + for SM.  Pt is hypotensive requiring pressor and inotropic support.  ID follow up today on Cefepime will follow.   R PTC for PTX    CT C/A/P: sub q emphysema in R chest wall, GGO RUL, small ascites CTH negative; Abd US: GB thickening, pericholecystic fluid     Perchole drain in place continues to drain total output overnight 133.  Fever today 38.8    duplex LE negative    Patient with persistent abdominal pain, refusing tube feeds and medications, Psych consulted   CTA A/P ordered to r/o mesenteric ischemia 2/2 persistent anorexia, nausea, vomiting. Revealed:  Evaluation of the mesenteric vessels is limited by streak artifact from LVAD. There appears to be severe stenosis of the proximal SMA; abdominal mesenteric doppler is recommended for further evaluation. 2.  No small bowel findings to suggest acute mesenteric ischemia. 3.  Focal dissections involving the right and left common iliac arteries.  8/15: Cultures resulted BC 1/2 +GPC in clusters, SC enterobacter; mesenteric duplex: borderline stenosis of proximal SMA  : CT C/A/P noncon: Nondular opacities in R lung apex w/cavitation, abd nl  :  Continue current care, treatment of thyrotoxicosis with medications as per endocrine, d/c ABX as per team.    RUQ sono: Contracted gallbladder with cholecystostomy tube in place.  9/10 failed SMA stent, c/b RP bleed s/p 3U PRCB   CTA Abd/Pelvis L RP hematoma    Trach decannulated    intubated fro resp distress for increased WOB, LL pneumonia; CT C/A/P: progressive ISABELLE opacities and L consolidations, multifocal pneumonia    TTE (EF 25%, decreased RV, mod MR)   10/3 VT -> Lidocaine gtt   10/4 Trach size 6 DCT cuff  10/5 CT abd (neg for intra-abd abcess, severe stenosis of prox SMA and b/l iliac arteries)  10/18 SMA Stent   10/23 Emend for nausea   10/24 +Occult    Today:  TFs held briefly this AM for new NG tube placed, will restart feeds and increase back to 35cc/hr as tolerated. Tolerating TC since 10/19, continue as tolerated. Ambulate as tolerated. Pending stepdown bed.    REVIEW OF SYSTEMS:  Speak over trach   Gen: No fever  EYES/ENT: No visual changes;  No vertigo or throat pain   NECK: No pain   RES:  No shortness of breath or Cough  CARD: No chest pain   GI: +intermittently c/o abd pain and nausea   : No dysuria  NEURO: No weakness  SKIN: No itching, rashes     ICU Vital Signs Last 24 Hrs  T(C): 36.1 (26 Oct 2021 04:00), Max: 36.2 (25 Oct 2021 20:00)  T(F): 97 (26 Oct 2021 04:00), Max: 97.2 (25 Oct 2021 20:00)  HR: 122 (26 Oct 2021 07:00) (101 - 132)  BP: --  BP(mean): --  ABP: 101/76 (26 Oct 2021 06:00) (87/62 - 125/116)  ABP(mean): 88 (26 Oct 2021 06:00) (71 - 119)  RR: 24 (26 Oct 2021 07:00) (16 - 33)  SpO2: 95% (26 Oct 2021 07:00) (92% - 100%)      Physical Exam:  Gen:  NAD  CNS: moves all extremities to command   Neck: no JVD, +trach, +central line   RES : coarse breath sounds, no wheezing    CVS: +LVAD hum   Abd: Soft. Sybil drain with bilious fluid. Positive BS throughout. Mild RUQ abdominal tenderness by perc sybil.  Skin: No rash, erythema, cyanosis.  Vasc: Warm and well-perfused.  Ext:  no edema    ============================I/O===========================   I&O's Detail    25 Oct 2021 07:01  -  26 Oct 2021 07:00  --------------------------------------------------------  IN:    dextrose 5% + sodium chloride 0.9%: 50 mL    IV PiggyBack: 1100 mL    IV PiggyBack: 100 mL    Miscellaneous Tube Feedin mL    sodium chloride 0.9%: 150 mL  Total IN: 2060 mL    OUT:    Drain (mL): 625 mL    Voided (mL): 1050 mL  Total OUT: 1675 mL    Total NET: 385 mL        ============================ LABS =========================                        9.1    12.89 )-----------( 253      ( 26 Oct 2021 01:18 )             27.2     10-26    129<L>  |  92<L>  |  5<L>  ----------------------------<  75  3.9   |  21<L>  |  0.55    Ca    9.5      26 Oct 2021 01:18  Phos  2.5     10-26  Mg     1.6     10-26    TPro  7.4  /  Alb  3.7  /  TBili  0.6  /  DBili  x   /  AST  13  /  ALT  13  /  AlkPhos  211<H>  10-26    LIVER FUNCTIONS - ( 26 Oct 2021 01:18 )  Alb: 3.7 g/dL / Pro: 7.4 g/dL / ALK PHOS: 211 U/L / ALT: 13 U/L / AST: 13 U/L / GGT: x             ABG - ( 26 Oct 2021 00:51 )  pH, Arterial: 7.40  pH, Blood: x     /  pCO2: 39    /  pO2: 92    / HCO3: 24    / Base Excess: -0.5  /  SaO2: 99.6                ======================Micro/Rad/Cardio=================  Culture: Reviewed   CXR: Reviewed  Echo:Reviewed  ======================================================  PAST MEDICAL & SURGICAL HISTORY:  CHF (congestive heart failure)    CAD (coronary artery disease)    Depression    Pleural effusion    History of  novel coronavirus disease (COVID-19)  2020    Hemorrhoids    Bleeding hemorrhoids    Peripheral arterial disease    Claudication    BPH with urinary obstruction    ACC/AHA stage D systolic heart failure    Anticoagulation goal of INR 2.0 to 2.5    Falls    Clavicle fracture    CKD (chronic kidney disease), stage III    Iron deficiency anemia    H/O epistaxis    Vertigo    GI bleed    S/P TVR (tricuspid valve repair)    S/P ventricular assist device    S/P endoscopy      ====================ASSESSMENT ==============  Stage D Nonischemic Cardiomyopathy, Status Post HM2 on 2017    Cardiogenic shock  Hemodynamic instability   Acute hypoxemic respiratory failure s/p trach , decannulated on ; Reintubated on    GI bleed , Status Post Enteroscopy   Anemia, in setting of melena   Chronic Kidney Disease  Stress hyperglycemia   C.diff positive on    Hypovolemic shock  Septic shock  Leukocytosis  GB thickening/percholecystic s/p perc sybil by IR   SMA stenosis s/p SMA Stent on 10/18   Serratia/citrobacter pneumonia   Stenotrophomonas pneumonia   Enterobacter pneumonia   Nasuea/vomiting  Deconditioning  Hyponatremia    DNR / No vent     Plan:  ====================== NEUROLOGY=====================  Continue close monitoring of neuro status   C/w sertraline, prochlorperazine, and mirtazapine for depression   Fentanyl PRN for pain      fentaNYL    Injectable 12 MICROGram(s) IV Push every 3 hours PRN Moderate to severe pain  mirtazapine 7.5 milliGRAM(s) Oral daily  prochlorperazine   Tablet 10 milliGRAM(s) Oral three times a day  sertraline 100 milliGRAM(s) Oral daily    ==================== RESPIRATORY======================  Acute hypoxemic respiratory failure s/p #8 shiley trach on ; decannulated on ; Reintubated on ; trach size 6 DCT cuff on 10/4   Continue close monitoring of respiratory rate and breathing pattern, following of ABG’s with [place] monitoring, continuous pulse oximetry monitoring.   Tolerating TC since 10/19, continue as tolerated   Guaifenesin PO PRN mucous plugging     guaiFENesin Oral Liquid (Sugar-Free) 200 milliGRAM(s) Oral every 4 hours PRN mucous plugging    ====================CARDIOVASCULAR==================  Stage D Nonischemic Cardiomyopathy, Status Post HM2 on 2017; LVAD settings 9200, flow 5.4  TTE 10/4:  Severely decreased LV systolic function, Diffuse hypokinesis. Mild AR. No pericardial effusion   VT on 10/4, s/p Lidocaine drip, continue close tele monitoring   ASA/Plavix for recent SMA stent on hold due to drop in h/h and +occult feces on 10/24    ===================HEMATOLOGIC/ONC ===================  CTA A/P on  +L RP hematoma   Acute blood loss anemia, monitor H&H/Plts   LUE US on 10/20 with no pseudoaneurysm  +occult feces on 10/24     ===================== RENAL =========================  Persistent hyponatremia, now rising 126->129, currently on  NS+D5 @ 30 ml/h,    As per renal will consider UreNa, if sodium dose not improve   Hx of CKD, continue monitoring urine output, I&OS, BUN/Cr   Replete lytes PRN. Keep K> 4 and Mg >2    ==================== GASTROINTESTINAL===================  TFs held briefly this AM for new NG tube placed, will restart feeds and increase back to 35cc/hr as tolerated   CT A/P 10/5 neg for intra-abd abcess, severe stenosis of prox SMA and b/l iliac arteries  CTA A/P : Evaluation of the mesenteric vessels is limited by streak artifact from LVAD. There appears to be severe stenosis of the proximal SMA; abdominal mesenteric doppler is recommended for further evaluation. 2.  No small bowel findings to suggest acute mesenteric ischemia. 3.  Focal dissections involving the right and left common iliac arteries.  Stenosis of proximal SMA, s/p failed SMA stent c/b RP bleed on 9/10 - SMA stent with Vascular 10/18   Simethicone PRN for gas   Reglan for gut motility  Zofran for nausea   +occult feces on 10/24     multivitamin 1 Tablet(s) Oral daily  GI prophylaxis, pantoprazole  Injectable 40 milliGRAM(s) IV Push every 12 hours  simethicone 80 milliGRAM(s) Chew every 8 hours PRN Gas  sodium chloride 0.9%. 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   CT C/A/P : progressive ISABELLE opacities and L consolidations, multifocal pneumonia  BCx 1/2 on 10/1 +Enterobacter cloacae complex, BCx  10/14+ Serratia marcescens   BCx 10/14 and SCX on 10/14 +Stenotrophomonas maltophilia s/p Bactrim   BCx 10/22 and 10/23 NGTD         Patient requires continuous monitoring with bedside rhythm monitoring, pulse ox monitoring, and intermittent blood gas analysis. Care plan discussed with ICU care team. Patient remained critical and at risk for life threatening decompensation.     By signing my name below, I, Agnieszka Cherry, attest that this documentation has been prepared under the direction and in the presence of Jaclyn Mendoza NP   Electronically signed: Cesia Shaffer, 10-26-21 @ 07:56    I, Jaclyn Mendoza, personally performed the services described in this documentation. all medical record entries made by the luisibmel were at my direction and in my presence. I have reviewed the chart and agree that the record reflects my personal performance and is accurate and complete  Electronically signed: Jaclyn Mendoza NP        RICKY HAMPTON  MRN-[id number]  Patient is a 66y old  Male who presents with a chief complaint of Anemia, Supratherapeutic INR, Dark Stools (26 Oct 2021 07:37)    HPI:  65M PMH ACC/AHA stage D HF due to NICM HM2 LVAD , TV annuloplasty ring 17 as destination therapy due to severe peripheral artery disease with significant stenosis  SIADH, Depression, CKD-3 with hyperkalemia, past E. coli UTIs, driveline drainage (21) and COVID-19 (back in 2020)  He was recently seen in clinic where he complained of abdominal pain and dark stools w constipation back in May. He presents to [facility] ER today weakness and fatigue, moderate and + Black stools for three days, on coumadin secondary to warfarin use in the setting of an LVAD. Patient has required transfusions for GIB in the past. Mostly recently back in 2021 pt had anemia with dark stools. No interventions was done at that time. However Last Endoscopy was done in 2020 (negative). Today labs show patient is anemic with H/H of 4.5/16.3,. INR is 8.84 MAP in the 90s, Temp 35.1. He denies any chest pain, shortness of breath, dizziness, abd pain, nausea or vomiting.       (2021 16:57)      Surgery/Hospital Course:   admit for melena w/ anemia, INR 8.84   6/15 Capsul study (+) for small bowel bleed, balloon endoscopy (old blood in prox ileum); post EGD - septic w/ L opacity, re-intubated for concern for aspiration, TTE (Mod MR, decrease biV w/ interventricular septum boweing towards R)   bronch    +C Diff    CT C/A/P: Fluid filled colon which may be 2/2 rapid transit. Small bilateral pleffs with associates. Compressive atelectasis New ISABELLE & LLL  parenchymal opacities, suspicious for pneumonia. Moderate stenosis in the proximal superior mesenteric artery.    #8 Shiley trach at bedside    LVAD speed increased to 9200   Bronch   TC since . Patient transferred to SDU.    INR today 2.64.  H&H 7.3/24 this AM.  Will repeat CBC at noon, and will send stool guaiac Patient with persistent abdominal tenderness, rate of tube feeds decreased.  No nausea/vomiting.     INR today 2.4. H&H 9.1/28.6 low flow overnight /N&V, refusing Tube feeds on D5 1/2 normal  @50 cc/hr   INR 2.69  H&H 7.7/.1 refusing Tube feeds on D5 1/2 normal  @50 cc/hr. This am + BM Melena Dr Oneill HF  aware- PRBC x1  GI team consulted -  NPO  plan on study in am-  D/w Dr Cadet Patient  to return to CTU for further management; 1PRBCS    Post op INR 2.2 today.  No bleeding. BC + for SM.  Pt is hypotensive requiring pressor and inotropic support.  ID follow up today on Cefepime will follow.   R PTC for PTX    CT C/A/P: sub q emphysema in R chest wall, GGO RUL, small ascites CTH negative; Abd US: GB thickening, pericholecystic fluid     Perchole drain in place continues to drain total output overnight 133.  Fever today 38.8    duplex LE negative    Patient with persistent abdominal pain, refusing tube feeds and medications, Psych consulted   CTA A/P ordered to r/o mesenteric ischemia 2/2 persistent anorexia, nausea, vomiting. Revealed:  Evaluation of the mesenteric vessels is limited by streak artifact from LVAD. There appears to be severe stenosis of the proximal SMA; abdominal mesenteric doppler is recommended for further evaluation. 2.  No small bowel findings to suggest acute mesenteric ischemia. 3.  Focal dissections involving the right and left common iliac arteries.  8/15: Cultures resulted BC 1/2 +GPC in clusters, SC enterobacter; mesenteric duplex: borderline stenosis of proximal SMA  : CT C/A/P noncon: Nondular opacities in R lung apex w/cavitation, abd nl  :  Continue current care, treatment of thyrotoxicosis with medications as per endocrine, d/c ABX as per team.    RUQ sono: Contracted gallbladder with cholecystostomy tube in place.  9/10 failed SMA stent, c/b RP bleed s/p 3U PRCB   CTA Abd/Pelvis L RP hematoma    Trach decannulated    intubated fro resp distress for increased WOB, LL pneumonia; CT C/A/P: progressive ISABELLE opacities and L consolidations, multifocal pneumonia    TTE (EF 25%, decreased RV, mod MR)   10/3 VT -> Lidocaine gtt   10/4 Trach size 6 DCT cuff  10/5 CT abd (neg for intra-abd abcess, severe stenosis of prox SMA and b/l iliac arteries)  10/18 SMA Stent   10/23 Emend for nausea   10/24 +Occult    Today:  TFs held briefly this AM for new NG tube placed, will restart feeds and increase back to 35cc/hr as tolerated. Tolerating TC since 10/19, continue as tolerated. Ambulate as tolerated. Pending stepdown bed.    REVIEW OF SYSTEMS:  Speak over trach   Gen: No fever  EYES/ENT: No visual changes;  No vertigo or throat pain   NECK: No pain   RES:  No shortness of breath or Cough  CARD: No chest pain   GI: +intermittently c/o abd pain and nausea   : No dysuria  NEURO: No weakness  SKIN: No itching, rashes     ICU Vital Signs Last 24 Hrs  T(C): 36.1 (26 Oct 2021 04:00), Max: 36.2 (25 Oct 2021 20:00)  T(F): 97 (26 Oct 2021 04:00), Max: 97.2 (25 Oct 2021 20:00)  HR: 122 (26 Oct 2021 07:00) (101 - 132)  BP: --  BP(mean): --  ABP: 101/76 (26 Oct 2021 06:00) (87/62 - 125/116)  ABP(mean): 88 (26 Oct 2021 06:00) (71 - 119)  RR: 24 (26 Oct 2021 07:00) (16 - 33)  SpO2: 95% (26 Oct 2021 07:00) (92% - 100%)      Physical Exam:  Gen:  NAD  CNS: moves all extremities to command   Neck: no JVD, +trach, +central line   RES : coarse breath sounds, no wheezing    CVS: +LVAD hum   Abd: Soft. Sybil drain with bilious fluid. Positive BS throughout. Mild RUQ abdominal tenderness by perc sybil.  Skin: No rash, erythema, cyanosis.  Vasc: Warm and well-perfused.  Ext:  no edema    ============================I/O===========================   I&O's Detail    25 Oct 2021 07:01  -  26 Oct 2021 07:00  --------------------------------------------------------  IN:    dextrose 5% + sodium chloride 0.9%: 50 mL    IV PiggyBack: 1100 mL    IV PiggyBack: 100 mL    Miscellaneous Tube Feedin mL    sodium chloride 0.9%: 150 mL  Total IN: 2060 mL    OUT:    Drain (mL): 625 mL    Voided (mL): 1050 mL  Total OUT: 1675 mL    Total NET: 385 mL        ============================ LABS =========================                        9.1    12.89 )-----------( 253      ( 26 Oct 2021 01:18 )             27.2     10-26    129<L>  |  92<L>  |  5<L>  ----------------------------<  75  3.9   |  21<L>  |  0.55    Ca    9.5      26 Oct 2021 01:18  Phos  2.5     10-26  Mg     1.6     10-26    TPro  7.4  /  Alb  3.7  /  TBili  0.6  /  DBili  x   /  AST  13  /  ALT  13  /  AlkPhos  211<H>  10-26    LIVER FUNCTIONS - ( 26 Oct 2021 01:18 )  Alb: 3.7 g/dL / Pro: 7.4 g/dL / ALK PHOS: 211 U/L / ALT: 13 U/L / AST: 13 U/L / GGT: x             ABG - ( 26 Oct 2021 00:51 )  pH, Arterial: 7.40  pH, Blood: x     /  pCO2: 39    /  pO2: 92    / HCO3: 24    / Base Excess: -0.5  /  SaO2: 99.6                ======================Micro/Rad/Cardio=================  Culture: Reviewed   CXR: Reviewed  Echo:Reviewed  ======================================================  PAST MEDICAL & SURGICAL HISTORY:  CHF (congestive heart failure)    CAD (coronary artery disease)    Depression    Pleural effusion    History of  novel coronavirus disease (COVID-19)  2020    Hemorrhoids    Bleeding hemorrhoids    Peripheral arterial disease    Claudication    BPH with urinary obstruction    ACC/AHA stage D systolic heart failure    Anticoagulation goal of INR 2.0 to 2.5    Falls    Clavicle fracture    CKD (chronic kidney disease), stage III    Iron deficiency anemia    H/O epistaxis    Vertigo    GI bleed    S/P TVR (tricuspid valve repair)    S/P ventricular assist device    S/P endoscopy      ====================ASSESSMENT ==============  Stage D Nonischemic Cardiomyopathy, Status Post HM2 on 2017    Cardiogenic shock  Hemodynamic instability   Acute hypoxemic respiratory failure s/p trach , decannulated on ; Reintubated on    GI bleed , Status Post Enteroscopy   Anemia, in setting of melena   Chronic Kidney Disease  Stress hyperglycemia   C.diff positive on    Hypovolemic shock  Septic shock  Leukocytosis  GB thickening/percholecystic s/p perc sybil by IR   SMA stenosis s/p SMA Stent on 10/18   Serratia/citrobacter pneumonia   Stenotrophomonas pneumonia   Enterobacter pneumonia   Nasuea/vomiting  Deconditioning  Hyponatremia    DNR / No vent     Plan:  ====================== NEUROLOGY=====================  Continue close monitoring of neuro status   C/w sertraline, prochlorperazine, and mirtazapine for depression   Fentanyl PRN for pain    Continue PT and ambulation as tolerated    fentaNYL    Injectable 12 MICROGram(s) IV Push every 3 hours PRN Moderate to severe pain  mirtazapine 7.5 milliGRAM(s) Oral daily  prochlorperazine   Tablet 10 milliGRAM(s) Oral three times a day  sertraline 100 milliGRAM(s) Oral daily    ==================== RESPIRATORY======================  Acute hypoxemic respiratory failure s/p #8 shiley trach on ; decannulated on ; Reintubated on ; trach size 6 DCT cuff on 10/4   Continue close monitoring of respiratory rate and breathing pattern, following of ABG’s with [place] monitoring, continuous pulse oximetry monitoring.   Tolerating TC since 10/19, continue as tolerated   Guaifenesin PO PRN mucous plugging     guaiFENesin Oral Liquid (Sugar-Free) 200 milliGRAM(s) Oral every 4 hours PRN mucous plugging    ====================CARDIOVASCULAR==================  Stage D Nonischemic Cardiomyopathy, Status Post HM2 on 2017; LVAD settings 9200, flow 5.4  TTE 10/4:  Severely decreased LV systolic function, Diffuse hypokinesis. Mild AR. No pericardial effusion   VT on 10/4, s/p Lidocaine drip, continue close tele monitoring   ASA/Plavix for recent SMA stent on hold due to drop in h/h and +occult feces on 10/24    ===================HEMATOLOGIC/ONC ===================  CTA A/P on  +L RP hematoma   Acute blood loss anemia, monitor H&H/Plts   LUE US on 10/20 with no pseudoaneurysm  +occult feces on 10/24     ===================== RENAL =========================  Persistent hyponatremia, now rising 126->129, currently on  NS+D5 @ 30 ml/h,    As per renal will consider UreNa, if sodium dose not improve   Hx of CKD, continue monitoring urine output, I&OS, BUN/Cr   Replete lytes PRN. Keep K> 4 and Mg >2    ==================== GASTROINTESTINAL===================  TFs held briefly this AM for new NG tube placed, will restart feeds and increase back to 35cc/hr as tolerated   CT A/P 10/5 neg for intra-abd abcess, severe stenosis of prox SMA and b/l iliac arteries  CTA A/P : Evaluation of the mesenteric vessels is limited by streak artifact from LVAD. There appears to be severe stenosis of the proximal SMA; abdominal mesenteric doppler is recommended for further evaluation. 2.  No small bowel findings to suggest acute mesenteric ischemia. 3.  Focal dissections involving the right and left common iliac arteries.  Stenosis of proximal SMA, s/p failed SMA stent c/b RP bleed on 9/10 - SMA stent with Vascular 10/18   Simethicone PRN for gas   Reglan for gut motility  Zofran for nausea   +occult feces on 10/24     multivitamin 1 Tablet(s) Oral daily  GI prophylaxis, pantoprazole  Injectable 40 milliGRAM(s) IV Push every 12 hours  simethicone 80 milliGRAM(s) Chew every 8 hours PRN Gas  sodium chloride 0.9%. 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   CT C/A/P : progressive ISABELLE opacities and L consolidations, multifocal pneumonia  BCx 1/2 on 10/1 +Enterobacter cloacae complex, BCx  10/14+ Serratia marcescens   BCx 10/14 and SCX on 10/14 +Stenotrophomonas maltophilia s/p Bactrim   BCx 10/22 and 10/23 NGTD         Patient requires continuous monitoring with bedside rhythm monitoring, pulse ox monitoring, and intermittent blood gas analysis. Care plan discussed with ICU care team. Patient remained critical and at risk for life threatening decompensation.     By signing my name below, I, Agnieszka Cherry, attest that this documentation has been prepared under the direction and in the presence of Jaclyn Mendoza NP   Electronically signed: Romel Shaffer, 10-26-21 @ 07:56    I, Jaclyn Mendoza, personally performed the services described in this documentation. all medical record entries made by the romel were at my direction and in my presence. I have reviewed the chart and agree that the record reflects my personal performance and is accurate and complete  Electronically signed: Jaclyn Mendoza NP

## 2021-10-27 NOTE — PROGRESS NOTE ADULT - SUBJECTIVE AND OBJECTIVE BOX
RICKY JOINT  MRN#: [id number]  Subjective:  Pulmonary progress  : recurrent Acute hypoxic respiratory Failure ,aspiration pneumonia, NICM  ,   65M PMH ACC/AHA stage D HF due to NICM HM2 LVAD , TV annuloplasty ring 17 as destination therapy due to severe peripheral artery disease with significant stenosis  SIADH, Depression, CKD-3 with hyperkalemia, past E. coli UTIs, driveline drainage (21) and COVID-19 (back in 2020)  He was recently seen in clinic where he complained of abdominal pain and dark stools w constipation back in May. He presents to [facility] ER today weakness and fatigue, moderate and + Black stools for three days, on coumadin secondary to warfarin use in the setting of an LVAD. Patient has required transfusions for GIB in the past. Mostly recently back in 2021 pt had anemia with dark stools. No interventions was done at that time. However Last Endoscopy was done in 2020 (negative). Today labs show patient is anemic with H/H of 4.5/16.3,. INR is 8.84 MAP in the 90s, Temp 35.1. He denies any chest pain, shortness of breath, dizziness, abd pain, nausea or vomiting. found to have  rectal bleeding underwent endoscopy ,old blood in the proximal ileum ,  develop sepsis with LL opacity given Antibiotics , Extubated , reintubated , Bronchoscopy on Zosyn for LL pneumonia  and Amiodarone S/P TV Annuloplasty , patient remain intubated on full ventilatory support .S/P multiple units of blood transfusion , remain on full ventilatory support on Precedex and propofol , new central IJ line , diarrhea C diff. +ve on po vancomycin and IV Flagyl,  mildly distended belly , fever start on cefepime 2gm q 8 hrs S/P tracheostomy .  new RT Subclavian central line continue on contact  isolation ,S/P  C-diff antibiotics, no more diarrhea back on full support mechanical ventilator , chest x ray show improvement in LLL air space disease, more awake and responsive on tube feeding no more diarrhea ,  no nausea or vomiting or diarrhea still very weak and tired , back on tube feeding ,still on po vancomycin , getting PT and OT at bed side ,   , no SOB getting stronger , improve muscle tone patient transfer to monitor bed still on contact isolation for C-Difficel colitis on 50% FI02, and change to Suresh  tube feeding still loose stool . H/H drop significantly require blood transfusion , most likely GI bleeding , IV heparin D/C ,  H/H is stable ., patient develop TR sided  pneumothorax require chest tube placement , RT IJ central line  placed , develop fever shaking chills , blood culture positive for serratia marcescens , start on cefepime .the patient  become hypoxic and hypotensive placed on full ventilatory support and Vasopressin , levophed and Dobutamine ,S/P blood transfusion on meropenem and vancomycin ,   , on and  off pressors , occasional agitation on Precedex .S/P IR cholecystostomy tube drainage placement in the RT upper Quadrant , resume anticoagulation chest x ray noted C-PAP trail lasted only for 2 hrs , new RT SC line and D/C RT IJ line , RT pig tail cathter has been removed , tolerating C-PAP trial placed on trach. collar 50% FI02 GI consultant noted on NG tube feeding as tolerated , develop AF RVR S/P  bolus Amiodarone  back to regular sinus Rhythm , Flat Affect depressed , back on tube feeding Vital AF at 60 cc/ hr .still intermittent abdominal pain , no fever saturation is accepted  back on full ventilatory support ,   on methimazole for hyperthyroidism ,  condition is the same ,still C/O Abdominal pain , white count is improving , no chest pain or SOB ,  .placed on Reglan 10 mg TID for gastric motility , depressed , withdrawn. , S/P  mesenteric angiogram , unable to stent SMA S/P 3 units of blood transfusion   RT IJ in place reassessed for stent in the SMA by vascular,  dark stool stable H/H ,surgical opinion for possible Lap cholecystectomy. over night events noted develop respiratory distress, , rectal bleeding, require intubation placed on full ventilatory support , FI02 is 50% also became hemianosmically unstable require triple pressor support with levophed , vasopressin and norsynephrine, pan culture placed  on Vancomycin IV and PO  and Zosyn, sedated with propofol kept NPO , new central line RT and left IJ cathter placed . Diflucan Added .fever of 38.5 weaned off  vasopressin ,   fever adding IV vancomycin and  vancomycin solution  , and Bactrim , S/P  tracheostomy , bleeding receiving blood transfusion on vasopressin , no more bleeding , no fever , more awake and responsive ,tolerating tube feeding , ID and heart failure follow up appreciated , depresses no weaning for now , magnesium is low to give supplement too keep Above 2 , patient S/P  vascular procedure for superior mesenteric artery occlusion S/P 2 stent placement , patient tolerate it very well  , left upper extremities hematoma .vascular follow up appreciated , increase WBC , new RT UPE midline , black tarry stool , very loose R/O recurrent GI bleeding and C-dificile  colitis stable H/H no events over night , diarrhea is improved , yet increase WBC , hyponatremia no change      (2021 16:57)    PAST MEDICAL & SURGICAL HISTORY:  CHF (congestive heart failure)    CAD (coronary artery disease)  Depression    Pleural effusion    History of 2019 novel coronavirus disease (COVID-19)  2020    Hemorrhoids    Bleeding hemorrhoids    Peripheral arterial disease    Claudication    BPH with urinary obstruction    ACC/AHA stage D systolic heart failure  Anticoagulation goal of INR 2.0 to 2.5    Falls    Clavicle fracture    CKD (chronic kidney disease), stage III    Iron deficiency anemia    H/O epistaxis    Vertigo    GI bleed    S/P TVR (tricuspid valve repair)    S/P ventricular assist device    S/P endoscopy    OBJECTIVE:  ICU Vital Signs Last 24 Hrs  T(C): 38.2 (2021 10:00), Max: 38.5 (2021 12:00)  T(F): 100.8 (2021 10:00), Max: 101.3 (2021 12:00)  HR: 65 (2021 10:00) (61 - 69)  BP: --  BP(mean): --  ABP: 105/67 (2021 10:00) (90/54 - 113/64)  ABP(mean): 77 (2021 10:00) (63 - 77)  RR: 20 (2021 10:00) (19 - 35)  SpO2: 99% (2021 10:00) (96% - 100%)       @ 07:01  -   @ 07:00  --------------------------------------------------------  IN: 2693.9 mL / OUT: 1415 mL / NET: 1278.9 mL     @ 07: @ 10:49  --------------------------------------------------------  IN: 420.8 mL / OUT: 115 mL / NET: 305.8 mL  PHYSICAL EXAM: Daily   Elderly male  on trach. collar  FI02 is 40% S/P tracheostomy   Daily Weight in k.4 (2021 00:00)  HEENT:     + NCAT  + EOMI  - Conjuctival edema   - Icterus   - Thrush   - ETT  + NGT/OGT  Neck:         + FROM  + JVD  - Nodes - Masses + Mid-line trachea + Tracheostomy  Chest:            normal A-P diameter    Lungs:          + CTA   + Rhonchi    - Rales    - Wheezing + Decreased  LT BS   - Dullness R L  Cardiac:       + S1 + S2    + RRR   - Irregular   - S3  - S4    - Murmurs   - Rub   - Hamman’s sign   Abdomen:    + BS  + Soft + Non-tender - Distended - Organomegaly - PEG .cholecystostomy tube in place  Extremities:   - Cyanosis U/L   - Clubbing  U/L  + LE/UE Edema LUE hematoma   + Capillary refill    + Pulses   Neuro:        - Awake   -  Alert   - Confused   - Lethargic   + Sedated  + Generalized Weakness  Skin:        - Rashes    - Erythema   + Normal incisions   + IV sites intact          HOSPITAL MEDICATIONS: All medications reviewed and analyzed  MEDICATIONS  (STANDING):  amiodarone    Tablet 200 milliGRAM(s) Oral daily  chlorhexidine 0.12% Liquid 15 milliLiter(s) Oral Mucosa every 12 hours  chlorhexidine 2% Cloths 1 Application(s) Topical <User Schedule>  dexmedetomidine Infusion 0.5 MICROgram(s)/kG/Hr (9.81 mL/Hr) IV Continuous <Continuous>  dextrose 50% Injectable 50 milliLiter(s) IV Push every 15 minutes  heparin  Infusion 400 Unit(s)/Hr (12.5 mL/Hr) IV Continuous <Continuous>  Hydromorphone  Injectable 0.5 milliGRAM(s) IV Push once  insulin lispro (ADMELOG) corrective regimen sliding scale   SubCutaneous every 6 hours  pantoprazole  Injectable 40 milliGRAM(s) IV Push every 12 hours  piperacillin/tazobactam IVPB.. 3.375 Gram(s) IV Intermittent every 8 hours  propofol Infusion 20 MICROgram(s)/kG/Min (9.42 mL/Hr) IV Continuous <Continuous>  sodium chloride 0.9% lock flush 3 milliLiter(s) IV Push every 8 hours  sodium chloride 0.9%. 1000 milliLiter(s) (10 mL/Hr) IV Continuous <Continuous>    MEDICATIONS  (PRN):  acetaminophen    Suspension .. 650 milliGRAM(s) Oral every 6 hours PRN Temp greater or equal to 38C (100.4F)    LABS: All Lab data reviewed and analyzed                         9.4    17.16 )-----------( 261      ( 27 Oct 2021 00:22 )             28.3   10-27    129<L>  |  95<L>  |  6<L>  ----------------------------<  89  4.6   |  21<L>  |  0.51    Ca    9.8      27 Oct 2021 00:24  Phos  2.4     10-  Mg     1.6     10-27    TPro  7.5  /  Alb  3.7  /  TBili  0.6  /  DBili  x   /  AST  20  /  ALT  16  /  AlkPhos  210<H>  10-27    Ca    9.5      26 Oct 2021 01:18  Phos  2.5     10-  Mg     1.6     10-26    TPro  7.4  /  Alb  3.7  /  TBili  0.6  /  DBili  x   /  AST  13  /  ALT  13  /  AlkPhos  211<H>  10-26      Ca    8.5      25 Oct 2021 00:44  Phos  2.3     10-25  Mg     1.6     10-25    TPro  6.3  /  Alb  3.2<L>  /  TBili  0.5  /  DBili  x   /  AST  12  /  ALT  11  /  AlkPhos  176<H>  10-25    Ca    8.9      24 Oct 2021 01:10  Phos  2.4     10-24  Mg     1.7     10-    TPro  6.6  /  Alb  3.4  /  TBili  0.8  /  DBili  x   /  AST  13  /  ALT  11  /  AlkPhos  191<H>  10-24                                                                                                                                                                              PTT - ( 2021 04:52 )  PTT:45.2 sec LIVER FUNCTIONS - ( 2021 00:42 )  Alb: 3.4 g/dL / Pro: 6.7 g/dL / ALK PHOS: 213 U/L / ALT: 15 U/L / AST: 24 U/L / GGT: x           RADIOLOGY: - Reviewed and analyzed  , LVAD HM2, CT scan of abdomen reviewed result noted

## 2021-10-27 NOTE — PROGRESS NOTE ADULT - ASSESSMENT
Patient is a 66 year old male with PMH of stage D NICM s/p HM2 on 9/2017 as DT (due to severe PAD) with TV ring, prior COVID-19 infection 4/2020, recurrent syncope post LVAD s/p ILR, and chronic abdominal pain with prior negative workup who was admitted 6/14/21 with symptomatic anemia with Hgb 4.5 in setting of INR 8.8 without hemodynamic instability and has since had a prolonged hospital course. The nephrology team was consulted for hyponatremia over the past one week.     Hyponatremia:   - patient admitted 6/14 and has had a prolonged hospital course since with episodes of bacteremia   - hyponatremia with a sodium of 129 this AM; stable right now   - suspect this is the setting of excess free water with the patient on IVF with D5 as well as receiving bactrim in D5 solution as well, also is on sertraline that can contribute to hyponatremia  - now that has finished course of bactrim and less infusion of free water expect sodium levels to improve    - monitor BMP; if the sodium starts to downtrend again will consider starting Urena  - patient asymptomatic at this time     If any questions, please feel free to contact me     Gary Webb  Nephrology Fellow  [facility] Pager: [phone]

## 2021-10-27 NOTE — PROVIDER CONTACT NOTE (CHANGE IN STATUS NOTIFICATION) - ACTION/TREATMENT ORDERED:
Continue to monitor, Georgina at bedside. Albumin, cardene.
IV Haldol given. Pt started on Precedex. Pt placed on 1:1. Safety maintained.
Tube feedings stopped and tube flushed.  Conchita TAYLOR called and at bedside to assess patient.  NS 250cc IV bolus, Lopressor 2.5mg ivpx1, Zofran 4mg ivpx1 given as ordered.  Pts flow now 3.4-4.2 continue to monitor patient.  Call bell in reach.
psychiatric consult to be called, continue to monitor for signs of change. continue to prompt patient to reattempt education and allowance for enteral nutrition. will continue to educate PRN.

## 2021-10-27 NOTE — PROGRESS NOTE ADULT - ATTENDING COMMENTS
Will start lopressor for continued tachycardia (? runs of SVT) which may be partially beta blocker withdrawal as patient previously on propranolol. Recheck TFT's. Leukocytosis worsening, will re-culture and consider chronic suppressive antibiotics.

## 2021-10-27 NOTE — PROGRESS NOTE ADULT - ASSESSMENT
67 YO M with a history of stage D NICM s/p HM2 on 9/2017 as DT (due to severe PAD) with TV ring, prior COVID-19 infection 4/2020, recurrent syncope post LVAD s/p ILR, and chronic abdominal pain with prior negative workup who was admitted 6/14/21 with symptomatic anemia with Hgb 4.5 in setting of INR 8.8 without hemodynamic instability and has since had a protracted hospitalization. He was transfused and underwent VCE which showed active bleeding in the mid small bowel but subsequent enteroscopy 6/15 did not reveal any active bleeding. He acutely decompensated after procedure with fever/hypertension, low flow alarms, and pulmonary infiltrate with hypoxia requiring intubation from probable aspiration PNA. He was unable to extubated and has since undergone tracheostomy but tolerating persistent trach collar and nearing ability for decannulation. 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 He underwent angiogram on 9/10, stent was unable to be placed and course was then complicated by RP bleed. He continued to have persistent leukocytosis and febrile episodes and was noted to have positive sputum culture for serratia marcescens and completed his course of abx. He was initially decannulated on 9/23. Recently he started having multiples of melena, emesis and went into acte respiratory distress and was ultimately re-intubated on 9/26.     He had blood cultures are positive for enterobacter cloacae/serratia, remains on IV antibiotics. He is s/p trach with ENT on 10/4. Sputum cultures positive for stenotrophomonas and blood cultures positive for serratia on bactrim. S/p SMA stent x 2 10/18.   Completed ten days of IV Bactrim for his positive blood cultures  More agitated today but otherwise unchanged    Morris Prater MD  [phone]  After 5pm/weekends [phone]

## 2021-10-27 NOTE — PROGRESS NOTE ADULT - PROBLEM SELECTOR PLAN 1
- ACC/AHA stage D systolic heart failure s/p LVAD   - Will be started on Lopressor 12.5 mg PO BID today (MAP goal 70-80)

## 2021-10-27 NOTE — PROGRESS NOTE ADULT - PROBLEM SELECTOR PLAN 2
- Stable w/o evidence of LVAD malfunction, settings as above  - H/o recurrent GI bleeding  - LDH slowly uptrending, continue to trend   - now DNR

## 2021-10-27 NOTE — PROVIDER CONTACT NOTE (CHANGE IN STATUS NOTIFICATION) - BACKGROUND
Patient previously had lower flow values than expected, currently treating with albumin x2.
6/14 admitted with anemia, + melena and INR   9/12/2017 s/p LVAD HM2
LVAD. Pt admitted 6/15. Trached.
stage D NICM s/p HM2 on 9/2017 as DT (due to severe PAD) with TV ring, prior COVID-19 infection 4/2020, recurrent syncope post LVAD s/p ILR, and chronic abdominal pain with prior negative workup who was admitted with symptomatic anemia with Hgb 4.5 in setting of INR 8.8 without hemodynamic instability. of recent, has had multiple episodes of refusal and agitation requiring reorientation, repeat physical exam findings and unwillingness to receive therapies

## 2021-10-27 NOTE — PROGRESS NOTE ADULT - ATTENDING COMMENTS
Hyponatremia: chronic, intermittent, asymptomatic. Does not appear volume overloaded  labs consistent with true hyponatremia and SIADH in the setting of use of IV D5w, SSRI and pain  Pt. now off of IV bactrim (was receiving it in D5w)  sodium level stable today  will hold off on addition of UreNa at this time, however may need to consider if sodium again starts to down trend.  Avoid hypotonic fluids.   monitor urine osm.    Melissa Chin MD  Cell : [phone]  Pager : [phone]  Office : [phone]

## 2021-10-27 NOTE — PROGRESS NOTE ADULT - SUBJECTIVE AND OBJECTIVE BOX
RICKY HAMPTON  MRN-[id number]  Patient is a 66y old  Male who presents with a chief complaint of Anemia, Supratherapeutic INR, Dark Stools (26 Oct 2021 18:17)    HPI:  65M PMH ACC/AHA stage D HF due to NICM HM2 LVAD , TV annuloplasty ring 17 as destination therapy due to severe peripheral artery disease with significant stenosis  SIADH, Depression, CKD-3 with hyperkalemia, past E. coli UTIs, driveline drainage (21) and COVID-19 (back in 2020)  He was recently seen in clinic where he complained of abdominal pain and dark stools w constipation back in May. He presents to [facility] ER today weakness and fatigue, moderate and + Black stools for three days, on coumadin secondary to warfarin use in the setting of an LVAD. Patient has required transfusions for GIB in the past. Mostly recently back in 2021 pt had anemia with dark stools. No interventions was done at that time. However Last Endoscopy was done in 2020 (negative). Today labs show patient is anemic with H/H of 4.5/16.3,. INR is 8.84 MAP in the 90s, Temp 35.1. He denies any chest pain, shortness of breath, dizziness, abd pain, nausea or vomiting.       (2021 16:57)      Surgery/Hospital Course:   admit for melena w/ anemia, INR 8.84   6/15 Capsul study (+) for small bowel bleed, balloon endoscopy (old blood in prox ileum); post EGD - septic w/ L opacity, re-intubated for concern for aspiration, TTE (Mod MR, decrease biV w/ interventricular septum boweing towards R)   bronch    +C Diff    CT C/A/P: Fluid filled colon which may be 2/2 rapid transit. Small bilateral pleffs with associates. Compressive atelectasis New ISABELLE & LLL  parenchymal opacities, suspicious for pneumonia. Moderate stenosis in the proximal superior mesenteric artery.    #8 Shiley trach at bedside    LVAD speed increased to 9200   Bronch   TC since . Patient transferred to SDU.    INR today 2.64.  H&H 7.3/24 this AM.  Will repeat CBC at noon, and will send stool guaiac Patient with persistent abdominal tenderness, rate of tube feeds decreased.  No nausea/vomiting.     INR today 2.4. H&H 9.1/28.6 low flow overnight /N&V, refusing Tube feeds on D5 1/2 normal  @50 cc/hr   INR 2.69  H&H 7.7/.1 refusing Tube feeds on D5 1/2 normal  @50 cc/hr. This am + BM Melena Dr Oneill HF  aware- PRBC x1  GI team consulted -  NPO  plan on study in am-  D/w Dr Cadet Patient  to return to CTU for further management; 1PRBCS    Post op INR 2.2 today.  No bleeding. BC + for SM.  Pt is hypotensive requiring pressor and inotropic support.  ID follow up today on Cefepime will follow.   R PTC for PTX    CT C/A/P: sub q emphysema in R chest wall, GGO RUL, small ascites CTH negative; Abd US: GB thickening, pericholecystic fluid     Perchole drain in place continues to drain total output overnight 133.  Fever today 38.8    duplex LE negative    Patient with persistent abdominal pain, refusing tube feeds and medications, Psych consulted   CTA A/P ordered to r/o mesenteric ischemia 2/2 persistent anorexia, nausea, vomiting. Revealed:  Evaluation of the mesenteric vessels is limited by streak artifact from LVAD. There appears to be severe stenosis of the proximal SMA; abdominal mesenteric doppler is recommended for further evaluation. 2.  No small bowel findings to suggest acute mesenteric ischemia. 3.  Focal dissections involving the right and left common iliac arteries.  8/15: Cultures resulted BC 1/2 +GPC in clusters, SC enterobacter; mesenteric duplex: borderline stenosis of proximal SMA  : CT C/A/P noncon: Nondular opacities in R lung apex w/cavitation, abd nl  :  Continue current care, treatment of thyrotoxicosis with medications as per endocrine, d/c ABX as per team.    RUQ sono: Contracted gallbladder with cholecystostomy tube in place.  9/10 failed SMA stent, c/b RP bleed s/p 3U PRCB   CTA Abd/Pelvis L RP hematoma    Trach decannulated    intubated fro resp distress for increased WOB, LL pneumonia; CT C/A/P: progressive ISABELLE opacities and L consolidations, multifocal pneumonia    TTE (EF 25%, decreased RV, mod MR)   10/3 VT -> Lidocaine gtt   10/4 Trach size 6 DCT cuff  10/5 CT abd (neg for intra-abd abcess, severe stenosis of prox SMA and b/l iliac arteries)  10/18 SMA Stent   10/23 Emend for nausea   10/24 +Occult    Today:  Pending step down unit bed.     REVIEW OF SYSTEMS:  Speak over trach   Gen: No fever  EYES/ENT: No visual changes;  No vertigo or throat pain   NECK: No pain   RES:  No shortness of breath or Cough  CARD: No chest pain   GI: +intermittently c/o abd pain and nausea   : No dysuria  NEURO: No weakness  SKIN: No itching, rashes       ICU Vital Signs Last 24 Hrs  T(C): 36.2 (26 Oct 2021 20:00), Max: 36.7 (26 Oct 2021 16:00)  T(F): 97.1 (26 Oct 2021 20:00), Max: 98 (26 Oct 2021 16:00)  HR: 145 (27 Oct 2021 07:00) (109 - 145)  BP: --  BP(mean): --  ABP: 80/64 (27 Oct 2021 07:00) (77/64 - 141/121)  ABP(mean): 72 (27 Oct 2021 07:00) (70 - 137)  RR: 24 (27 Oct 2021 07:00) (17 - 42)  SpO2: 100% (27 Oct 2021 07:00) (81% - 100%)      Physical Exam:  Gen:  NAD  CNS: moves all extremities to command   Neck: no JVD, +trach, +central line   RES : coarse breath sounds, no wheezing    CVS: +LVAD hum   Abd: Soft. Sybil drain with bilious fluid. Positive BS throughout. Mild RUQ abdominal tenderness by perc sybil.  Skin: No rash, erythema, cyanosis.  Vasc: Warm and well-perfused.  Ext:  no edema    ============================I/O===========================   I&O's Detail    26 Oct 2021 07:01  -  27 Oct 2021 07:00  --------------------------------------------------------  IN:    Enteral Tube Flush: 50 mL    IV PiggyBack: 50 mL    Miscellaneous Tube Feedin mL    sodium chloride 0.9%: 90 mL  Total IN: 815 mL    OUT:    Drain (mL): 150 mL    Voided (mL): 650 mL  Total OUT: 800 mL    Total NET: 15 mL        ============================ LABS =========================                        9.4    17.16 )-----------( 261      ( 27 Oct 2021 00:22 )             28.3     10-27    129<L>  |  95<L>  |  6<L>  ----------------------------<  89  4.6   |  21<L>  |  0.51    Ca    9.8      27 Oct 2021 00:24  Phos  2.4     10-27  Mg     1.6     10-27    TPro  7.5  /  Alb  3.7  /  TBili  0.6  /  DBili  x   /  AST  20  /  ALT  16  /  AlkPhos  210<H>  10-27    LIVER FUNCTIONS - ( 27 Oct 2021 00:24 )  Alb: 3.7 g/dL / Pro: 7.5 g/dL / ALK PHOS: 210 U/L / ALT: 16 U/L / AST: 20 U/L / GGT: x             ABG - ( 27 Oct 2021 00:11 )  pH, Arterial: 7.41  pH, Blood: x     /  pCO2: 39    /  pO2: 94    / HCO3: 25    / Base Excess: 0.1   /  SaO2: 99.7                ======================Micro/Rad/Cardio=================  Culture: Reviewed   CXR: Reviewed  Echo:Reviewed  ======================================================  PAST MEDICAL & SURGICAL HISTORY:  CHF (congestive heart failure)    CAD (coronary artery disease)    Depression    Pleural effusion    History of  novel coronavirus disease (COVID-19)  2020    Hemorrhoids    Bleeding hemorrhoids    Peripheral arterial disease    Claudication    BPH with urinary obstruction    ACC/AHA stage D systolic heart failure    Anticoagulation goal of INR 2.0 to 2.5    Falls    Clavicle fracture    CKD (chronic kidney disease), stage III    Iron deficiency anemia    H/O epistaxis    Vertigo    GI bleed    S/P TVR (tricuspid valve repair)    S/P ventricular assist device    S/P endoscopy      ====================ASSESSMENT ==============  Stage D Nonischemic Cardiomyopathy, Status Post HM2 on 2017    Cardiogenic shock  Hemodynamic instability   Acute hypoxemic respiratory failure s/p trach , decannulated on ; Reintubated on    GI bleed , Status Post Enteroscopy   Anemia, in setting of melena   Chronic Kidney Disease  Stress hyperglycemia   C.diff positive on    Hypovolemic shock  Septic shock  Leukocytosis  GB thickening/percholecystic s/p perc sybil by IR   SMA stenosis s/p SMA Stent on 10/18   Serratia/citrobacter pneumonia   Stenotrophomonas pneumonia   Enterobacter pneumonia   Nasuea/vomiting  Deconditioning  Hyponatremia  DNR / No vent     Plan:  ====================== NEUROLOGY=====================  Continue close monitoring of neuro status   C/w sertraline, prochlorperazine, and mirtazapine for depression   Fentanyl PRN for pain    Continue PT and ambulation as tolerated    fentaNYL    Injectable 12 MICROGram(s) IV Push every 3 hours PRN Moderate to severe pain  mirtazapine 7.5 milliGRAM(s) Oral at bedtime  prochlorperazine   Tablet 10 milliGRAM(s) Oral three times a day  sertraline 100 milliGRAM(s) Oral daily    ==================== RESPIRATORY======================  Acute hypoxemic respiratory failure s/p #8 shiley trach on ; decannulated on ; Reintubated on ; trach size 6 DCT cuff on 10/4   Continue close monitoring of respiratory rate and breathing pattern, following of ABG’s with [place] monitoring, continuous pulse oximetry monitoring.   Tolerating TC since 10/19, continue as tolerated   Guaifenesin PO PRN mucous plugging       guaiFENesin Oral Liquid (Sugar-Free) 200 milliGRAM(s) Oral every 4 hours PRN mucous plugging  ====================CARDIOVASCULAR==================  Stage D Nonischemic Cardiomyopathy, Status Post HM2 on 2017; LVAD settings 9200, flow 5.4  TTE 10/4:  Severely decreased LV systolic function, Diffuse hypokinesis. Mild AR. No pericardial effusion   VT on 10/4, s/p Lidocaine drip, continue close tele monitoring   ASA/Plavix for recent SMA stent on hold due to drop in h/h and +occult feces on 10/24  Rate control w/ lopressor     metoprolol tartrate Injectable 2.5 milliGRAM(s) IV Push once  ===================HEMATOLOGIC/ONC ===================  CTA A/P on  +L RP hematoma   Acute blood loss anemia, monitor H&H/Plts   LUE US on 10/20 with no pseudoaneurysm  +occult feces on 10/24   ===================== RENAL =========================  Persistent hyponatremia, now rising 126->129, currently on  NS+D5 @ 30 ml/h,    As per renal will consider UreNa, if sodium dose not improve   Hx of CKD, continue monitoring urine output, I&OS, BUN/Cr   Replete lytes PRN. Keep K> 4 and Mg >2  ==================== GASTROINTESTINAL===================  CT A/P 10/5 neg for intra-abd abcess, severe stenosis of prox SMA and b/l iliac arteries  CTA A/P : Evaluation of the mesenteric vessels is limited by streak artifact from LVAD. There appears to be severe stenosis of the proximal SMA; abdominal mesenteric doppler is recommended for further evaluation. 2.  No small bowel findings to suggest acute mesenteric ischemia. 3.  Focal dissections involving the right and left common iliac arteries.  Stenosis of proximal SMA, s/p failed SMA stent c/b RP bleed on 9/10 - SMA stent with Vascular 10/18   Simethicone PRN for gas   Reglan for gut motility  Zofran for nausea   +occult feces on 10/24     multivitamin 1 Tablet(s) Oral daily  pantoprazole  Injectable 40 milliGRAM(s) IV Push every 12 hours  simethicone 80 milliGRAM(s) Chew every 8 hours PRN Gas  sodium chloride 0.9%. 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   CT C/A/P : progressive ISABELLE opacities and L consolidations, multifocal pneumonia  BCx 1/2 on 10/1 +Enterobacter cloacae complex, BCx  10/14+ Serratia marcescens   BCx 10/14 and SCX on 10/14 +Stenotrophomonas maltophilia s/p Bactrim   BCx 10/22 and 10/23 NGTD    Patient requires continuous monitoring with bedside rhythm monitoring, pulse ox monitoring, and intermittent blood gas analysis. Care plan discussed with ICU care team. Patient remained critical and at risk for life threatening decompensation.     By signing my name below, I, Emily Roa, attest that this documentation has been prepared under the direction and in the presence of CLAUDIA Lee  Electronically signed: Emily Roa Scribe, 10-27-21 @ 08:27    I, CLAUDIA Lee , personally performed the services described in this documentation. all medical record entries made by the romel were at my direction and in my presence. I have reviewed the chart and agree that the record reflects my personal performance and is accurate and complete  Electronically signed: CLAUDIA Lee        RICKY HAMPTON  MRN-[id number]  Patient is a 66y old  Male who presents with a chief complaint of Anemia, Supratherapeutic INR, Dark Stools (26 Oct 2021 18:17)    HPI:  65M PMH ACC/AHA stage D HF due to NICM HM2 LVAD , TV annuloplasty ring 17 as destination therapy due to severe peripheral artery disease with significant stenosis  SIADH, Depression, CKD-3 with hyperkalemia, past E. coli UTIs, driveline drainage (21) and COVID-19 (back in 2020)  He was recently seen in clinic where he complained of abdominal pain and dark stools w constipation back in May. He presents to [facility] ER today weakness and fatigue, moderate and + Black stools for three days, on coumadin secondary to warfarin use in the setting of an LVAD. Patient has required transfusions for GIB in the past. Mostly recently back in 2021 pt had anemia with dark stools. No interventions was done at that time. However Last Endoscopy was done in 2020 (negative). Today labs show patient is anemic with H/H of 4.5/16.3,. INR is 8.84 MAP in the 90s, Temp 35.1. He denies any chest pain, shortness of breath, dizziness, abd pain, nausea or vomiting.       (2021 16:57)      Surgery/Hospital Course:   admit for melena w/ anemia, INR 8.84   6/15 Capsul study (+) for small bowel bleed, balloon endoscopy (old blood in prox ileum); post EGD - septic w/ L opacity, re-intubated for concern for aspiration, TTE (Mod MR, decrease biV w/ interventricular septum boweing towards R)   bronch    +C Diff    CT C/A/P: Fluid filled colon which may be 2/2 rapid transit. Small bilateral pleffs with associates. Compressive atelectasis New ISABELLE & LLL  parenchymal opacities, suspicious for pneumonia. Moderate stenosis in the proximal superior mesenteric artery.    #8 Shiley trach at bedside    LVAD speed increased to 9200   Bronch   TC since . Patient transferred to SDU.    INR today 2.64.  H&H 7.3/24 this AM.  Will repeat CBC at noon, and will send stool guaiac Patient with persistent abdominal tenderness, rate of tube feeds decreased.  No nausea/vomiting.     INR today 2.4. H&H 9.1/28.6 low flow overnight /N&V, refusing Tube feeds on D5 1/2 normal  @50 cc/hr   INR 2.69  H&H 7.7/.1 refusing Tube feeds on D5 1/2 normal  @50 cc/hr. This am + BM Melena Dr Oneill HF  aware- PRBC x1  GI team consulted -  NPO  plan on study in am-  D/w Dr Cadet Patient  to return to CTU for further management; 1PRBCS    Post op INR 2.2 today.  No bleeding. BC + for SM.  Pt is hypotensive requiring pressor and inotropic support.  ID follow up today on Cefepime will follow.   R PTC for PTX    CT C/A/P: sub q emphysema in R chest wall, GGO RUL, small ascites CTH negative; Abd US: GB thickening, pericholecystic fluid     Perchole drain in place continues to drain total output overnight 133.  Fever today 38.8    duplex LE negative    Patient with persistent abdominal pain, refusing tube feeds and medications, Psych consulted   CTA A/P ordered to r/o mesenteric ischemia 2/2 persistent anorexia, nausea, vomiting. Revealed:  Evaluation of the mesenteric vessels is limited by streak artifact from LVAD. There appears to be severe stenosis of the proximal SMA; abdominal mesenteric doppler is recommended for further evaluation. 2.  No small bowel findings to suggest acute mesenteric ischemia. 3.  Focal dissections involving the right and left common iliac arteries.  8/15: Cultures resulted BC 1/2 +GPC in clusters, SC enterobacter; mesenteric duplex: borderline stenosis of proximal SMA  : CT C/A/P noncon: Nondular opacities in R lung apex w/cavitation, abd nl  :  Continue current care, treatment of thyrotoxicosis with medications as per endocrine, d/c ABX as per team.    RUQ sono: Contracted gallbladder with cholecystostomy tube in place.  9/10 failed SMA stent, c/b RP bleed s/p 3U PRCB   CTA Abd/Pelvis L RP hematoma    Trach decannulated    intubated fro resp distress for increased WOB, LL pneumonia; CT C/A/P: progressive ISABELLE opacities and L consolidations, multifocal pneumonia    TTE (EF 25%, decreased RV, mod MR)   10/3 VT -> Lidocaine gtt   10/4 Trach size 6 DCT cuff  10/5 CT abd (neg for intra-abd abcess, severe stenosis of prox SMA and b/l iliac arteries)  10/18 SMA Stent   10/23 Emend for nausea   10/24 +Occult    Today:  Step down unit bed.   send bcx and cx of sybil drain. (WBC rising), off abx.  S&S called. Fees planned for friday   Started on lopressor for sinis tach       REVIEW OF SYSTEMS:  Speak over trach   Gen: No fever  EYES/ENT: No visual changes;  No vertigo or throat pain   NECK: No pain   RES:  No shortness of breath or Cough  CARD: No chest pain   GI: +intermittently c/o abd pain and nausea   : No dysuria  NEURO: No weakness  SKIN: No itching, rashes       ICU Vital Signs Last 24 Hrs  T(C): 36.2 (26 Oct 2021 20:00), Max: 36.7 (26 Oct 2021 16:00)  T(F): 97.1 (26 Oct 2021 20:00), Max: 98 (26 Oct 2021 16:00)  HR: 145 (27 Oct 2021 07:00) (109 - 145)  BP: --  BP(mean): --  ABP: 80/64 (27 Oct 2021 07:00) (77/64 - 141/121)  ABP(mean): 72 (27 Oct 2021 07:00) (70 - 137)  RR: 24 (27 Oct 2021 07:00) (17 - 42)  SpO2: 100% (27 Oct 2021 07:00) (81% - 100%)      Physical Exam:  Gen:  NAD  CNS: moves all extremities to command   Neck: no JVD, +trach, +central line   RES : coarse breath sounds, no wheezing    CVS: +LVAD hum   Abd: Soft. Sybil drain with bilious fluid. Positive BS throughout. Mild RUQ abdominal tenderness by perc sybil.  Skin: No rash, erythema, cyanosis.  Vasc: Warm and well-perfused.  Ext:  no edema    ============================I/O===========================   I&O's Detail    26 Oct 2021 07:01  -  27 Oct 2021 07:00  --------------------------------------------------------  IN:    Enteral Tube Flush: 50 mL    IV PiggyBack: 50 mL    Miscellaneous Tube Feedin mL    sodium chloride 0.9%: 90 mL  Total IN: 815 mL    OUT:    Drain (mL): 150 mL    Voided (mL): 650 mL  Total OUT: 800 mL    Total NET: 15 mL        ============================ LABS =========================                        9.4    17.16 )-----------( 261      ( 27 Oct 2021 00:22 )             28.3     10-27    129<L>  |  95<L>  |  6<L>  ----------------------------<  89  4.6   |  21<L>  |  0.51    Ca    9.8      27 Oct 2021 00:24  Phos  2.4     10-27  Mg     1.6     10-27    TPro  7.5  /  Alb  3.7  /  TBili  0.6  /  DBili  x   /  AST  20  /  ALT  16  /  AlkPhos  210<H>  10-27    LIVER FUNCTIONS - ( 27 Oct 2021 00:24 )  Alb: 3.7 g/dL / Pro: 7.5 g/dL / ALK PHOS: 210 U/L / ALT: 16 U/L / AST: 20 U/L / GGT: x             ABG - ( 27 Oct 2021 00:11 )  pH, Arterial: 7.41  pH, Blood: x     /  pCO2: 39    /  pO2: 94    / HCO3: 25    / Base Excess: 0.1   /  SaO2: 99.7                ======================Micro/Rad/Cardio=================  Culture: Reviewed   CXR: Reviewed  Echo:Reviewed  ======================================================  PAST MEDICAL & SURGICAL HISTORY:  CHF (congestive heart failure)    CAD (coronary artery disease)    Depression    Pleural effusion    History of  novel coronavirus disease (COVID-19)  2020    Hemorrhoids    Bleeding hemorrhoids    Peripheral arterial disease    Claudication    BPH with urinary obstruction    ACC/AHA stage D systolic heart failure    Anticoagulation goal of INR 2.0 to 2.5    Falls    Clavicle fracture    CKD (chronic kidney disease), stage III    Iron deficiency anemia    H/O epistaxis    Vertigo    GI bleed    S/P TVR (tricuspid valve repair)    S/P ventricular assist device    S/P endoscopy      ====================ASSESSMENT ==============  Stage D Nonischemic Cardiomyopathy, Status Post HM2 on 2017    Cardiogenic shock  Hemodynamic instability   Acute hypoxemic respiratory failure s/p trach , decannulated on ; Reintubated on    GI bleed , Status Post Enteroscopy   Anemia, in setting of melena   Chronic Kidney Disease  Stress hyperglycemia   C.diff positive on    Hypovolemic shock  Septic shock  Leukocytosis  GB thickening/percholecystic s/p perc sybil by IR   SMA stenosis s/p SMA Stent on 10/18   Serratia/citrobacter pneumonia   Stenotrophomonas pneumonia   Enterobacter pneumonia   Nasuea/vomiting  Deconditioning  Hyponatremia  DNR / No vent     Plan:  ====================== NEUROLOGY=====================  Continue close monitoring of neuro status   C/w sertraline, prochlorperazine, and mirtazapine for depression   Fentanyl PRN for pain    Continue PT and ambulation as tolerated    fentaNYL    Injectable 12 MICROGram(s) IV Push every 3 hours PRN Moderate to severe pain  mirtazapine 7.5 milliGRAM(s) Oral at bedtime  prochlorperazine   Tablet 10 milliGRAM(s) Oral three times a day  sertraline 100 milliGRAM(s) Oral daily    ==================== RESPIRATORY======================  Acute hypoxemic respiratory failure s/p #8 shiley trach on ; decannulated on ; Reintubated on ; trach size 6 DCT cuff on 10/4   Continue close monitoring of respiratory rate and breathing pattern, following of ABG’s with [place] monitoring, continuous pulse oximetry monitoring.   Tolerating TC since 10/19, continue as tolerated   pt DNR/ No vent   Guaifenesin PO PRN mucous plugging       guaiFENesin Oral Liquid (Sugar-Free) 200 milliGRAM(s) Oral every 4 hours PRN mucous plugging  ====================CARDIOVASCULAR==================  Stage D Nonischemic Cardiomyopathy, Status Post HM2 on 2017; LVAD settings 9200, flow 5.4  TTE 10/4:  Severely decreased LV systolic function, Diffuse hypokinesis. Mild AR. No pericardial effusion   hx of  VT, now resolved   ASA/Plavix for recent SMA stent on hold due to drop in h/h and +occult feces on 10/24  Rate control w/ lopressor 12.5q12 started on 10/27     metoprolol tartrate Injectable 2.5 milliGRAM(s) IV Push once  ===================HEMATOLOGIC/ONC ===================  CTA A/P on  +L RP hematoma   Acute blood loss anemia, monitor H&H/Plts   - ASA/lpaix on hold for recent drop in h/h in setting of (+) guaiac. (pt with recent SMA sent)    LUE US on 10/20 with no pseudoaneurysm  +occult feces on 10/24   ===================== RENAL =========================  Resolving hyponatremia  Hx of CKD, continue monitoring urine output, I&OS, BUN/Cr   Replete lytes PRN. Keep K> 4 and Mg >2  ==================== GASTROINTESTINAL===================  CT A/P 10/5 neg for intra-abd abcess, severe stenosis of prox SMA and b/l iliac arteries  CTA A/P : Evaluation of the mesenteric vessels is limited by streak artifact from LVAD. There appears to be severe stenosis of the proximal SMA; abdominal mesenteric doppler is recommended for further evaluation. 2.  No small bowel findings to suggest acute mesenteric ischemia. 3.  Focal dissections involving the right and left common iliac arteries.  Stenosis of proximal SMA, s/p failed SMA stent c/b RP bleed on 9/10 - SMA stent with Vascular 10/18   Simethicone PRN for gas   Reglan for gut motility  Zofran for nausea   +occult feces on 10/24     pt tolerating tube feeds now. Vital @45 with goal 60   S&S called for FEES planned for Friday  Pt has speaking valve by bedside.       multivitamin 1 Tablet(s) Oral daily  pantoprazole  Injectable 40 milliGRAM(s) IV Push every 12 hours  simethicone 80 milliGRAM(s) Chew every 8 hours PRN Gas  sodium chloride 0.9%. 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   BCx 1/2 on 10/1 +Enterobacter cloacae complex, BCx  10/14+ Serratia marcescens   BCx 10/14 and SCX on 10/14 +Stenotrophomonas maltophilia s/p Bactrim   BCx 10/22 and 10/23 NGTD    Bcx and Cx of sybil drain sent on 10/27 for rising WBC today     Patient requires continuous monitoring with bedside rhythm monitoring, pulse ox monitoring, and intermittent blood gas analysis. Care plan discussed with ICU care team. Patient remained critical and at risk for life threatening decompensation.     By signing my name below, I, Emily Roa, attest that this documentation has been prepared under the direction and in the presence of CLAUDIA Lee  Electronically signed: Emily Roa Scribe, 10-27-21 @ 08:27    IGeorgina PA , personally performed the services described in this documentation. all medical record entries made by the luisibmel were at my direction and in my presence. I have reviewed the chart and agree that the record reflects my personal performance and is accurate and complete  Electronically signed: CLAUDIA Lee

## 2021-10-27 NOTE — PROGRESS NOTE ADULT - SUBJECTIVE AND OBJECTIVE BOX
Subjective: Patient seen and examined resting in bed.     Medications:  chlorhexidine 2% Cloths 1 Application(s) Topical <User Schedule>  fentaNYL    Injectable 12 MICROGram(s) IV Push every 3 hours PRN  guaiFENesin Oral Liquid (Sugar-Free) 200 milliGRAM(s) Oral every 4 hours PRN  insulin lispro (ADMELOG) corrective regimen sliding scale   SubCutaneous every 6 hours  magnesium sulfate  IVPB 2 Gram(s) IV Intermittent once  methimazole 15 milliGRAM(s) Oral daily  metoclopramide Injectable 10 milliGRAM(s) IV Push every 8 hours  metoprolol tartrate 12.5 milliGRAM(s) Oral every 12 hours  mirtazapine 7.5 milliGRAM(s) Oral at bedtime  multivitamin 1 Tablet(s) Oral daily  ondansetron Injectable 8 milliGRAM(s) IV Push every 8 hours  pantoprazole  Injectable 40 milliGRAM(s) IV Push every 12 hours  predniSONE   Tablet 4 milliGRAM(s) Oral daily  prochlorperazine   Tablet 10 milliGRAM(s) Oral three times a day  sertraline 100 milliGRAM(s) Oral daily  simethicone 80 milliGRAM(s) Chew every 8 hours PRN  sodium chloride 0.9%. 1000 milliLiter(s) IV Continuous <Continuous>  thiamine 100 milliGRAM(s) Oral daily      ICU Vital Signs Last 24 Hrs  T(C): 35.6 (27 Oct 2021 08:00), Max: 36.7 (26 Oct 2021 16:00)  T(F): 96.1 (27 Oct 2021 08:00), Max: 98 (26 Oct 2021 16:00)  HR: 115 (27 Oct 2021 12:00) (109 - 145)  BP: --  BP(mean): --  ABP: 88/71 (27 Oct 2021 12:00) (74/68 - 141/121)  ABP(mean): 80 (27 Oct 2021 12:00) (70 - 137)  RR: 19 (27 Oct 2021 12:00) (17 - 42)  SpO2: 99% (27 Oct 2021 12:00) (81% - 100%)      Weight in k.9 (10-27 @ 00:00)    I&O's Summary    26 Oct 2021 07:01  -  27 Oct 2021 07:00  --------------------------------------------------------  IN: 815 mL / OUT: 800 mL / NET: 15 mL    27 Oct 2021 07:01  -  27 Oct 2021 12:16  --------------------------------------------------------  IN: 250 mL / OUT: 50 mL / NET: 200 mL        Physical Exam  General: No distress. Comfortable.  HEENT: EOM intact.  Neck: +trach collar   Chest: Clear to auscultation bilaterally  CV: Normal S1 and S2. No murmurs, rub, or gallops. Radial pulses normal.  Abdomen: Soft, non-distended, non-tender, +keotube, +biliary drain   Neurology: Alert and oriented times three    LVAD Interrogation  VAD TYPE Hm 2  Speed 9200  Flow 4.6  Power 5.3  PI  6.4  Assessment of driveline exit site: driveline dressing c/d/i  Programming changes: no changes made    Labs:                        9.4    17.16 )-----------( 261      ( 27 Oct 2021 00:22 )             28.3     10-27    129<L>  |  95<L>  |  6<L>  ----------------------------<  89  4.6   |  21<L>  |  0.51    Ca    9.8      27 Oct 2021 00:24  Phos  2.4     10-27  Mg     1.6     10-27    TPro  7.5  /  Alb  3.7  /  TBili  0.6  /  DBili  x   /  AST  20  /  ALT  16  /  AlkPhos  210<H>  10-27              Lactate Dehydrogenase, Serum: 262 U/L (10-27 @ 00:24)  Lactate Dehydrogenase, Serum: 242 U/L (10-26 @ 01:18)  Lactate Dehydrogenase, Serum: 204 U/L (10-25 @ 00:44)

## 2021-10-27 NOTE — PROGRESS NOTE ADULT - ASSESSMENT
Assessment and Recommendation:   · Assessment	  Assessment and recommendation :  Recurrent Acute hypoxic respiratory Failure  FI02 is 40% S/P tracheostomy  on track. collar   Acute blood loss anemia S/P multiple  blood transfusion post tracheostomy   recurrent  septic shock  weaned off  vasopressin   sepsis   on IV bactrim  ,IV vancomycin and  po vancomycin   C-Dificle colitis on po vancomycin   S/P cholecystostomy tube placement by IR    patient is S/P  repeat vacular procedure and SMA stent x2 complicated with LUE hematoma   AF RVR back to regular sinus Rhythm   Non ischemic cardiomyopathy continue ACE inhibitor and B-Blockers   hyponatremia fluid restriction   S/P 3 unit of blood transfusion   Stage D systolic heart failure S/P LVAD HM2   MH2 LVAD  with  TV Annuloplasty  Severe peripheral vascular disease   severe hyperglycemia on insulin coverage    Reglan 10 mg for Gastric Motility   hyperthyroidism on Methimazole 10mg TID   critical care polyneuropathy   Anemia of Acute blood Loss   severe protein caloric malnutrition   magnesium supplement keep above 2   Chronic kidney disease stage III  Depressed and withdrawn   on  NG tube feeding   GI prophylaxis with PPI   Discussed with TCV team

## 2021-10-27 NOTE — PROGRESS NOTE ADULT - PROBLEM SELECTOR PLAN 5
- since stopping abx his WBC has started to rise. Will be started on suppression abx. Will need to discuss with ID.   - Most recent culture from 10/22 negative. Plan to repeat cultures today   - Prior cholecystitis w/ per dawn drain with bilious output.

## 2021-10-27 NOTE — PROGRESS NOTE ADULT - SUBJECTIVE AND OBJECTIVE BOX
INFECTIOUS DISEASES FOLLOW UP-- Anitra Prater  [phone]    This is a follow up note for this  66yMale with  Anemia    Acute cholecystitis    Agitated today at times        ROS:  CONSTITUTIONAL: agitated, would like to go home    Allergies    Amiodarone Hydrochloride (Other (Severe))    Intolerances        ANTIBIOTICS/RELEVANT:  antimicrobials    immunologic:    OTHER:  chlorhexidine 2% Cloths 1 Application(s) Topical <User Schedule>  dexMEDEtomidine Infusion 0.5 MICROgram(s)/kG/Hr IV Continuous <Continuous>  fentaNYL    Injectable 12 MICROGram(s) IV Push every 3 hours PRN  guaiFENesin Oral Liquid (Sugar-Free) 200 milliGRAM(s) Oral every 4 hours PRN  insulin lispro (ADMELOG) corrective regimen sliding scale   SubCutaneous every 6 hours  methimazole 15 milliGRAM(s) Oral daily  metoclopramide Injectable 10 milliGRAM(s) IV Push every 8 hours  metoprolol tartrate 12.5 milliGRAM(s) Oral every 12 hours  mirtazapine 7.5 milliGRAM(s) Oral at bedtime  multivitamin 1 Tablet(s) Oral daily  ondansetron Injectable 8 milliGRAM(s) IV Push every 8 hours  pantoprazole  Injectable 40 milliGRAM(s) IV Push every 12 hours  predniSONE   Tablet 4 milliGRAM(s) Oral daily  prochlorperazine   Tablet 10 milliGRAM(s) Oral three times a day  sertraline 100 milliGRAM(s) Oral daily  simethicone 80 milliGRAM(s) Chew every 8 hours PRN  sodium chloride 0.9%. 1000 milliLiter(s) IV Continuous <Continuous>  thiamine 100 milliGRAM(s) Oral daily      Objective:  Vital Signs Last 24 Hrs  T(C): 35.8 (27 Oct 2021 16:00), Max: 36 (27 Oct 2021 12:00)  T(F): 96.5 (27 Oct 2021 16:00), Max: 96.8 (27 Oct 2021 12:00)  HR: 84 (27 Oct 2021 21:51) (84 - 145)  BP: --  BP(mean): --  RR: 20 (27 Oct 2021 21:51) (17 - 42)  SpO2: 100% (27 Oct 2021 21:51) (81% - 100%)    PHYSICAL EXAM:  Constitutional:no acute distress  Eyes:ALONSO, EOMI  Ear/Nose/Throat: no oral lesions,trach 	  Respiratory: clear BL  Cardiovascular: S1S2  Gastrointestinal:soft, cholecystotomy tube  Extremities:no e/e/c  No Lymphadenopathy  IV sites not inflammed.    LABS:                        9.8    16.12 )-----------( 288      ( 27 Oct 2021 17:07 )             30.0     10-27    129<L>  |  95<L>  |  6<L>  ----------------------------<  89  4.6   |  21<L>  |  0.51    Ca    9.8      27 Oct 2021 00:24  Phos  2.4     10-27  Mg     1.6     10-27    TPro  7.5  /  Alb  3.7  /  TBili  0.6  /  DBili  x   /  AST  20  /  ALT  16  /  AlkPhos  210<H>  10-27          MICROBIOLOGY:            RECENT CULTURES:  10-23 @ 16:27  .Blood Blood  --  --  --    No growth to date.  --  10-22 @ 22:01  .Blood Blood  --  --  --    No growth to date.  --      RADIOLOGY & ADDITIONAL STUDIES:    < from: Xray Chest 1 View- PORTABLE-Urgent (Xray Chest 1 View- PORTABLE-Urgent .) (10.27.21 @ 02:30) >    FINDINGS:  Life supporting devices and medical hardware are unchanged and stable in position consistent with chest x-ray from 10/26/2021.  Stable cardiomediastinal silhouette.  Clear lungs.  No pleural effusion. No pneumothorax.    IMPRESSION:  Life supporting devices medical hardware unchanged in stable position.    < end of copied text >

## 2021-10-27 NOTE — PROGRESS NOTE ADULT - ASSESSMENT
67 YO M with a history of stage D NICM s/p HM2 on 9/2017 as DT (due to severe PAD) with TV ring, prior COVID-19 infection 4/2020, recurrent syncope post LVAD s/p ILR, and chronic abdominal pain with prior negative workup who was admitted 6/14/21 with symptomatic anemia with Hgb 4.5 in setting of INR 8.8 without hemodynamic instability and has since had a protracted hospitalization. He was transfused and underwent VCE which showed active bleeding in the mid small bowel but subsequent enteroscopy 6/15 did not reveal any active bleeding. He acutely decompensated after procedure with fever/hypertension, low flow alarms, and pulmonary infiltrate with hypoxia requiring intubation from probable aspiration PNA. He was unable to extubated and has since undergone tracheostomy but tolerating persistent trach collar and nearing ability for decannulation. 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 He underwent angiogram on 9/10, stent was unable to be placed and course was then complicated by RP bleed. He continued to have persistent leukocytosis and febrile episodes and was noted to have positive sputum culture for serratia marcescens and completed his course of abx. He was initially decannulated on 9/23. Recently he started having multiples of melena, emesis and went into acte respiratory distress and was ultimately re-intubated on 9/26.     He had blood cultures positive for enterobacter cloacae/serratia and sputum positive for stenotrophomonas remains on IV antibiotics. He is s/p trach with ENT on 10/4. He underwent SMA stent x 2 on 10/18. His nausea and vomiting has improved, he is currently tolerating tube feeds (goal rate @ 60 cc/hr). Fecal occult blood was noted to be positive on 10/23, he is currently off DAPT. Has been transfused with multiple units of PRBC during this admission, last on 10/23. His overall prognosis remains poor, he is now DNR after family meeting.

## 2021-10-27 NOTE — PROGRESS NOTE ADULT - SUBJECTIVE AND OBJECTIVE BOX
[facility] DIVISION OF KIDNEY DISEASES AND HYPERTENSION -- FOLLOW UP NOTE  --------------------------------------------------------------------------------  Chief Complaint: Hyponatremia    24 hour events/subjective:    seen and examined this morning   no acute events overnight     PAST HISTORY  --------------------------------------------------------------------------------  No significant changes to PMH, PSH, FHx, SHx, unless otherwise noted    ALLERGIES & MEDICATIONS  --------------------------------------------------------------------------------  Allergies    Amiodarone Hydrochloride (Other (Severe))    Intolerances      Standing Inpatient Medications  chlorhexidine 2% Cloths 1 Application(s) Topical <User Schedule>  insulin lispro (ADMELOG) corrective regimen sliding scale   SubCutaneous every 6 hours  magnesium sulfate  IVPB 2 Gram(s) IV Intermittent once  methimazole 15 milliGRAM(s) Oral daily  metoclopramide Injectable 10 milliGRAM(s) IV Push every 8 hours  metoprolol tartrate 12.5 milliGRAM(s) Oral every 12 hours  mirtazapine 7.5 milliGRAM(s) Oral at bedtime  multivitamin 1 Tablet(s) Oral daily  ondansetron Injectable 8 milliGRAM(s) IV Push every 8 hours  pantoprazole  Injectable 40 milliGRAM(s) IV Push every 12 hours  predniSONE   Tablet 4 milliGRAM(s) Oral daily  prochlorperazine   Tablet 10 milliGRAM(s) Oral three times a day  sertraline 100 milliGRAM(s) Oral daily  sodium chloride 0.9%. 1000 milliLiter(s) IV Continuous <Continuous>  thiamine 100 milliGRAM(s) Oral daily    PRN Inpatient Medications  fentaNYL    Injectable 12 MICROGram(s) IV Push every 3 hours PRN  guaiFENesin Oral Liquid (Sugar-Free) 200 milliGRAM(s) Oral every 4 hours PRN  simethicone 80 milliGRAM(s) Chew every 8 hours PRN      REVIEW OF SYSTEMS      All other systems were reviewed and are negative, except as noted.    VITALS/PHYSICAL EXAM  --------------------------------------------------------------------------------  T(C): 35.6 (10-27-21 @ 08:00), Max: 36.7 (10-26-21 @ 16:00)  HR: 137 (10-27-21 @ 11:00) (109 - 145)  BP: --  RR: 28 (10-27-21 @ 11:00) (17 - 42)  SpO2: 97% (10-27-21 @ 11:00) (81% - 100%)  Wt(kg): --        10-26-21 @ 07:01  -  10-27-21 @ 07:00  --------------------------------------------------------  IN: 815 mL / OUT: 800 mL / NET: 15 mL    10-27-21 @ 07:01  -  10-27-21 @ 11:41  --------------------------------------------------------  IN: 200 mL / OUT: 50 mL / NET: 150 mL        Physical Exam:  	Gen: NAD  	HEENT: MMM, trach  	Pulm: CTA B/L  	CV: S1S2  	Abd: Soft, +BS, dawn tube   	Ext: No LE edema B/L  	Neuro: Awake and alert   	Skin: Warm and dry  	Vascular access: N/A    LABS/STUDIES  --------------------------------------------------------------------------------              9.4    17.16 >-----------<  261      [10-27-21 @ 00:22]              28.3     129  |  95  |  6   ----------------------------<  89      [10-27-21 @ 00:24]  4.6   |  21  |  0.51        Ca     9.8     [10-27-21 @ 00:24]      Mg     1.6     [10-27-21 @ 00:24]      Phos  2.4     [10-27-21 @ 00:24]    TPro  7.5  /  Alb  3.7  /  TBili  0.6  /  DBili  x   /  AST  20  /  ALT  16  /  AlkPhos  210  [10-27-21 @ 00:24]              [10-27-21 @ 00:24]  Serum Osmolality 262      [10-25-21 @ 15:57]    Creatinine Trend:  SCr 0.51 [10-27 @ 00:24]  SCr 0.55 [10-26 @ 01:18]  SCr 0.50 [10-25 @ 16:59]  SCr 0.49 [10-25 @ 00:44]  SCr 0.54 [10-24 @ 01:10]      Urine Sodium 70      [10-25-21 @ 16:36]  Urine Osmolality 210      [10-25-21 @ 16:36]    Iron 27, TIBC 202, %sat 13      [08-15-21 @ 10:20]  Ferritin 498      [08-15-21 @ 10:20]  Vitamin D (25OH) 11.0      [01-12-21 @ 14:15]  HbA1c 6.2      [09-01-17 @ 00:08]  TSH <0.01      [09-25-21 @ 04:14]  Lipid: chol 153, , HDL 34, LDL --      [08-15-21 @ 13:53]

## 2021-10-28 NOTE — PROGRESS NOTE ADULT - SUBJECTIVE AND OBJECTIVE BOX
Behavioral Cardiology Progress Note     History of present illness: Mr. Quispe is a 66 year-old man with history of NICM s/p HM2 on 9/2017 as DT (due to severe PAD) with TV ring, prior COVID-19 infection 4/2020, recurrent syncope post LVAD s/p ILR, and chronic abdominal pain with prior negative workup who was admitted with symptomatic anemia with Hgb 4.5 in setting of INR 8.8 without hemodynamic instability. Testing showed active bleeding in the small bowel but subsequent enteroscopy 6/15 did not reveal any active bleeding. He acutely decompensated after procedure with fever/hypertension, low flow alarms, and pulmonary infiltrate with hypoxia requiring intubation from probable aspiration PNA.  Course notable for inability to wean ventilator with persistent secretions for which he underwent tracheostomy as well as acute c diff colitis.     Social history: , lives in his sister’s house in [place]. Has 3 sons (2 live in , 1 in [facility]). One of 3 siblings. Lives in his sister’s house in [place] (Cristina Rudolph [phone]), also in the home are niece [facility] [phone]) and nephew (Miller Rudolph).  Another sister lives close by in [place] and all other siblings live in [place] which the family visit often.  Worked full time at [employer] in [place], stopped working due to heart disease. Education: high school graduate.     Substance use:   Tobacco: Former smoker, 8-10 cigarettes/day for 30 years.   Alcohol: Past use of 3-4 drinks of Johnson 2x/week. None for past 4 years.   Drug: Denies any drug use.

## 2021-10-28 NOTE — PROGRESS NOTE ADULT - ASSESSMENT
Mental status exam: Seen sleeping in bed in CTU with trach collar. Woke when name called but fell asleep throughout assessment. Communicated by mouthing words, using hand gestures, shaking head yes/no. Unable to identify date (reported month as May). Unable to assess thought process/content. Mood: gestured so-so. Affect somnolent. Denies symptoms of hopelessness, depression. Denies feeling anxious. Denies SI/HI.     Psychological assessment: Seen sleeping in bed in CTU with trach collar. Woke when name called. As per RN, Mr. Quispe has been noted to have episodes of agitation (pulling at tubes) since yesterday. Today however, he has been mostly sleeping. During evaluation fell asleep mid sentence several times. Unable to identify date (reported month as May). Remebered this writer but could not recall profession. Communicated by mouthing words, using hand gestures, shaking head yes/no. Mood: gestured so-so. Affect somnolent. Denies symptoms of hopelessness, depression. Denies feeling anxious. Denies SI/HI. Denies any abdominal pain. Did endorse left sided hip pain. RN aware.      Dx: Adjustment disorder with anxiety and depressed mood. F43.23 Systolic heart failure I50.2  LVAD Z95.811. Delirium, mixed hypoactive/hyperactive. F05    Recommendations:   Psychiatric follow up for delirium  Reorient frequently while delirium persists  Provide quiet environment   When possible, take outside weather permitting    Provide emotional support   Behavioral Cardiology will follow      18 minutes spent on total patient encounter     Jessica Hennessy, PhD  [phone]

## 2021-10-28 NOTE — PROGRESS NOTE ADULT - ATTENDING COMMENTS
Hyponatremia: chronic, intermittent, asymptomatic. Does not appear volume overloaded  labs consistent with true hyponatremia and SIADH in the setting of use of IV D5w, SSRI and pain  Pt. now off of IV bactrim (was receiving it in D5w)  sodium level 128 today  start UreNa 15 grams daily  monitor urine osm daily (UreNa likley to have a greater effect on patients with urine osm <450 and with low weight)  Avoid hypotonic fluids.       Melissa Chin MD  Cell : [phone]  Pager : [phone]  Office : [phone]

## 2021-10-28 NOTE — PROGRESS NOTE ADULT - SUBJECTIVE AND OBJECTIVE BOX
[facility] DIVISION OF KIDNEY DISEASES AND HYPERTENSION -- FOLLOW UP NOTE  --------------------------------------------------------------------------------  Chief Complaint:  Hyponatremia     24 hour events/subjective:    seen and examined this morning   sedated so unable to express concerns   overnight very agitated and pulling at his lines     PAST HISTORY  --------------------------------------------------------------------------------  No significant changes to PMH, PSH, FHx, SHx, unless otherwise noted    ALLERGIES & MEDICATIONS  --------------------------------------------------------------------------------  Allergies    Amiodarone Hydrochloride (Other (Severe))    Intolerances      Standing Inpatient Medications  chlorhexidine 2% Cloths 1 Application(s) Topical <User Schedule>  dexMEDEtomidine Infusion 0.5 MICROgram(s)/kG/Hr IV Continuous <Continuous>  insulin lispro (ADMELOG) corrective regimen sliding scale   SubCutaneous every 6 hours  methimazole 15 milliGRAM(s) Oral daily  metoclopramide Injectable 10 milliGRAM(s) IV Push every 8 hours  metoprolol tartrate 12.5 milliGRAM(s) Oral every 12 hours  mirtazapine 7.5 milliGRAM(s) Oral at bedtime  multivitamin 1 Tablet(s) Oral daily  ondansetron Injectable 8 milliGRAM(s) IV Push every 8 hours  pantoprazole  Injectable 40 milliGRAM(s) IV Push every 12 hours  prochlorperazine   Tablet 10 milliGRAM(s) Oral three times a day  sertraline 100 milliGRAM(s) Oral daily  sodium chloride 0.9%. 1000 milliLiter(s) IV Continuous <Continuous>  thiamine 100 milliGRAM(s) Oral daily    PRN Inpatient Medications  fentaNYL    Injectable 12 MICROGram(s) IV Push every 3 hours PRN  guaiFENesin Oral Liquid (Sugar-Free) 200 milliGRAM(s) Oral every 4 hours PRN  simethicone 80 milliGRAM(s) Chew every 8 hours PRN      REVIEW OF SYSTEMS  unable to obtain    VITALS/PHYSICAL EXAM  --------------------------------------------------------------------------------  T(C): 36.6 (10-28-21 @ 04:00), Max: 36.6 (10-28-21 @ 00:00)  HR: 98 (10-28-21 @ 04:00) (84 - 137)  BP: --  RR: 17 (10-28-21 @ 04:00) (17 - 33)  SpO2: 99% (10-28-21 @ 04:00) (95% - 100%)  Wt(kg): --        10-27-21 @ 07:01  -  10-28-21 @ 07:00  --------------------------------------------------------  IN: 853.2 mL / OUT: 630 mL / NET: 223.2 mL        Physical Exam:  	Gen: NAD  	HEENT: MMM, trach   	Pulm: CTA B/L  	CV: S1S2  	Abd: Soft, +BS, dawn tube    	Ext: No LE edema B/L  	Neuro: sedated  	Skin: Warm and dry  	Vascular access: N/A      LABS/STUDIES  --------------------------------------------------------------------------------              8.7    14.81 >-----------<  232      [10-28-21 @ 00:38]              26.6     128  |  94  |  12  ----------------------------<  154      [10-28-21 @ 00:38]  4.0   |  20  |  0.66        Ca     9.6     [10-28-21 @ 00:38]      Mg     1.8     [10-28-21 @ 00:38]      Phos  2.8     [10-28-21 @ 00:38]    TPro  7.2  /  Alb  3.5  /  TBili  0.6  /  DBili  x   /  AST  39  /  ALT  39  /  AlkPhos  192  [10-28-21 @ 00:38]              [10-28-21 @ 00:38]    Creatinine Trend:  SCr 0.66 [10-28 @ 00:38]  SCr 0.51 [10-27 @ 00:24]  SCr 0.55 [10-26 @ 01:18]  SCr 0.50 [10-25 @ 16:59]  SCr 0.49 [10-25 @ 00:44]      Urine Sodium 70      [10-25-21 @ 16:36]  Urine Osmolality 210      [10-25-21 @ 16:36]    Iron 27, TIBC 202, %sat 13      [08-15-21 @ 10:20]  Ferritin 498      [08-15-21 @ 10:20]  Vitamin D (25OH) 11.0      [01-12-21 @ 14:15]  HbA1c 6.2      [09-01-17 @ 00:08]  TSH <0.01      [09-25-21 @ 04:14]  Lipid: chol 153, , HDL 34, LDL --      [08-15-21 @ 13:53]

## 2021-10-28 NOTE — PROGRESS NOTE ADULT - SUBJECTIVE AND OBJECTIVE BOX
RICKY JOINT  MRN#: [id number]  Subjective:  Pulmonary progress  : recurrent Acute hypoxic respiratory Failure ,aspiration pneumonia, NICM  ,   65M PMH ACC/AHA stage D HF due to NICM HM2 LVAD , TV annuloplasty ring 17 as destination therapy due to severe peripheral artery disease with significant stenosis  SIADH, Depression, CKD-3 with hyperkalemia, past E. coli UTIs, driveline drainage (21) and COVID-19 (back in 2020)  He was recently seen in clinic where he complained of abdominal pain and dark stools w constipation back in May. He presents to [facility] ER today weakness and fatigue, moderate and + Black stools for three days, on coumadin secondary to warfarin use in the setting of an LVAD. Patient has required transfusions for GIB in the past. Mostly recently back in 2021 pt had anemia with dark stools. No interventions was done at that time. However Last Endoscopy was done in 2020 (negative). Today labs show patient is anemic with H/H of 4.5/16.3,. INR is 8.84 MAP in the 90s, Temp 35.1. He denies any chest pain, shortness of breath, dizziness, abd pain, nausea or vomiting. found to have  rectal bleeding underwent endoscopy ,old blood in the proximal ileum ,  develop sepsis with LL opacity given Antibiotics , Extubated , reintubated , Bronchoscopy on Zosyn for LL pneumonia  and Amiodarone S/P TV Annuloplasty , patient remain intubated on full ventilatory support .S/P multiple units of blood transfusion , remain on full ventilatory support on Precedex and propofol , new central IJ line , diarrhea C diff. +ve on po vancomycin and IV Flagyl,  mildly distended belly , fever start on cefepime 2gm q 8 hrs S/P tracheostomy .  new RT Subclavian central line continue on contact  isolation ,S/P  C-diff antibiotics, no more diarrhea back on full support mechanical ventilator , chest x ray show improvement in LLL air space disease, more awake and responsive on tube feeding no more diarrhea ,  no nausea or vomiting or diarrhea still very weak and tired , back on tube feeding ,still on po vancomycin , getting PT and OT at bed side ,   , no SOB getting stronger , improve muscle tone patient transfer to monitor bed still on contact isolation for C-Difficel colitis on 50% FI02, and change to Suresh  tube feeding still loose stool . H/H drop significantly require blood transfusion , most likely GI bleeding , IV heparin D/C ,  H/H is stable ., patient develop TR sided  pneumothorax require chest tube placement , RT IJ central line  placed , develop fever shaking chills , blood culture positive for serratia marcescens , start on cefepime .the patient  become hypoxic and hypotensive placed on full ventilatory support and Vasopressin , levophed and Dobutamine ,S/P blood transfusion on meropenem and vancomycin ,   , on and  off pressors , occasional agitation on Precedex .S/P IR cholecystostomy tube drainage placement in the RT upper Quadrant , resume anticoagulation chest x ray noted C-PAP trail lasted only for 2 hrs , new RT SC line and D/C RT IJ line , RT pig tail cathter has been removed , tolerating C-PAP trial placed on trach. collar 50% FI02 GI consultant noted on NG tube feeding as tolerated , develop AF RVR S/P  bolus Amiodarone  back to regular sinus Rhythm , Flat Affect depressed , back on tube feeding Vital AF at 60 cc/ hr .still intermittent abdominal pain , no fever saturation is accepted  back on full ventilatory support ,   on methimazole for hyperthyroidism ,  condition is the same ,still C/O Abdominal pain , white count is improving , no chest pain or SOB ,  .placed on Reglan 10 mg TID for gastric motility , depressed , withdrawn. , S/P  mesenteric angiogram , unable to stent SMA S/P 3 units of blood transfusion   RT IJ in place reassessed for stent in the SMA by vascular,  dark stool stable H/H ,surgical opinion for possible Lap cholecystectomy. over night events noted develop respiratory distress, , rectal bleeding, require intubation placed on full ventilatory support , FI02 is 50% also became hemianosmically unstable require triple pressor support with levophed , vasopressin and norsynephrine, pan culture placed  on Vancomycin IV and PO  and Zosyn, sedated with propofol kept NPO , new central line RT and left IJ cathter placed . Diflucan Added .fever of 38.5 weaned off  vasopressin ,   fever adding IV vancomycin and  vancomycin solution  , and Bactrim , S/P  tracheostomy , bleeding receiving blood transfusion on vasopressin , no more bleeding , no fever , more awake and responsive ,tolerating tube feeding , ID and heart failure follow up appreciated , depresses no weaning for now , magnesium is low to give supplement too keep Above 2 , patient S/P  vascular procedure for superior mesenteric artery occlusion S/P 2 stent placement , patient tolerate it very well  , left upper extremities hematoma .vascular follow up appreciated , increase WBC , new RT UPE midline , black tarry stool , very loose R/O recurrent GI bleeding and C-dificile  colitis stable H/H no events over night , diarrhea is improved ,  WBC is improving  , hyponatremia no change      (2021 16:57)    PAST MEDICAL & SURGICAL HISTORY:  CHF (congestive heart failure)    CAD (coronary artery disease)  Depression    Pleural effusion    History of 2019 novel coronavirus disease (COVID-19)  2020    Hemorrhoids    Bleeding hemorrhoids    Peripheral arterial disease    Claudication    BPH with urinary obstruction    ACC/AHA stage D systolic heart failure  Anticoagulation goal of INR 2.0 to 2.5    Falls    Clavicle fracture    CKD (chronic kidney disease), stage III    Iron deficiency anemia    H/O epistaxis    Vertigo    GI bleed    S/P TVR (tricuspid valve repair)    S/P ventricular assist device    S/P endoscopy    OBJECTIVE:  ICU Vital Signs Last 24 Hrs  T(C): 38.2 (2021 10:00), Max: 38.5 (2021 12:00)  T(F): 100.8 (2021 10:00), Max: 101.3 (2021 12:00)  HR: 65 (2021 10:00) (61 - 69)  BP: --  BP(mean): --  ABP: 105/67 (2021 10:00) (90/54 - 113/64)  ABP(mean): 77 (2021 10:00) (63 - 77)  RR: 20 (2021 10:00) (19 - 35)  SpO2: 99% (2021 10:00) (96% - 100%)       @ 07:01  -   @ 07:00  --------------------------------------------------------  IN: 2693.9 mL / OUT: 1415 mL / NET: 1278.9 mL     @ 07: @ 10:49  --------------------------------------------------------  IN: 420.8 mL / OUT: 115 mL / NET: 305.8 mL  PHYSICAL EXAM: Daily   Elderly male  on trach. collar  FI02 is 40% S/P tracheostomy   Daily Weight in k.4 (2021 00:00)  HEENT:     + NCAT  + EOMI  - Conjuctival edema   - Icterus   - Thrush   - ETT  + NGT/OGT  Neck:         + FROM  + JVD  - Nodes - Masses + Mid-line trachea + Tracheostomy  Chest:            normal A-P diameter    Lungs:          + CTA   + Rhonchi    - Rales    - Wheezing + Decreased  LT BS   - Dullness R L  Cardiac:       + S1 + S2    + RRR   - Irregular   - S3  - S4    - Murmurs   - Rub   - Hamman’s sign   Abdomen:    + BS  + Soft + Non-tender - Distended - Organomegaly - PEG .cholecystostomy tube in place  Extremities:   - Cyanosis U/L   - Clubbing  U/L  + LE/UE Edema LUE hematoma   + Capillary refill    + Pulses   Neuro:        - Awake   -  Alert   - Confused   - Lethargic   + Sedated  + Generalized Weakness  Skin:        - Rashes    - Erythema   + Normal incisions   + IV sites intact          HOSPITAL MEDICATIONS: All medications reviewed and analyzed  MEDICATIONS  (STANDING):  amiodarone    Tablet 200 milliGRAM(s) Oral daily  chlorhexidine 0.12% Liquid 15 milliLiter(s) Oral Mucosa every 12 hours  chlorhexidine 2% Cloths 1 Application(s) Topical <User Schedule>  dexmedetomidine Infusion 0.5 MICROgram(s)/kG/Hr (9.81 mL/Hr) IV Continuous <Continuous>  dextrose 50% Injectable 50 milliLiter(s) IV Push every 15 minutes  heparin  Infusion 400 Unit(s)/Hr (12.5 mL/Hr) IV Continuous <Continuous>  Hydromorphone  Injectable 0.5 milliGRAM(s) IV Push once  insulin lispro (ADMELOG) corrective regimen sliding scale   SubCutaneous every 6 hours  pantoprazole  Injectable 40 milliGRAM(s) IV Push every 12 hours  piperacillin/tazobactam IVPB.. 3.375 Gram(s) IV Intermittent every 8 hours  propofol Infusion 20 MICROgram(s)/kG/Min (9.42 mL/Hr) IV Continuous <Continuous>  sodium chloride 0.9% lock flush 3 milliLiter(s) IV Push every 8 hours  sodium chloride 0.9%. 1000 milliLiter(s) (10 mL/Hr) IV Continuous <Continuous>    MEDICATIONS  (PRN):  acetaminophen    Suspension .. 650 milliGRAM(s) Oral every 6 hours PRN Temp greater or equal to 38C (100.4F)    LABS: All Lab data reviewed and analyzed                         8.7    14.81 )-----------( 232      ( 28 Oct 2021 00:38 )  10-28    128<L>  |  94<L>  |  12  ----------------------------<  154<H>  4.0   |  20<L>  |  0.66    Ca    9.6      28 Oct 2021 00:38  Phos  2.8     10  Mg     1.8     10-28    TPro  7.2  /  Alb  3.5  /  TBili  0.6  /  DBili  x   /  AST  39  /  ALT  39  /  AlkPhos  192<H>  10-28               26.6   10    Ca    9.8      27 Oct 2021 00:24  Phos  2.4     10-  Mg     1.6     10-27                                                                                                                                                                                      PTT - ( 2021 04:52 )  PTT:45.2 sec LIVER FUNCTIONS - ( 2021 00:42 )  Alb: 3.4 g/dL / Pro: 6.7 g/dL / ALK PHOS: 213 U/L / ALT: 15 U/L / AST: 24 U/L / GGT: x           RADIOLOGY: - Reviewed and analyzed  , LVAD HM2, CT scan of abdomen reviewed result noted

## 2021-10-28 NOTE — PROGRESS NOTE ADULT - SUBJECTIVE AND OBJECTIVE BOX
RICKY HAMPTON  MRN-[id number]  Patient is a 66y old  Male who presents with a chief complaint of Anemia, Supratherapeutic INR, Dark Stools (28 Oct 2021 07:28)    HPI:  65M PMH ACC/AHA stage D HF due to NICM HM2 LVAD , TV annuloplasty ring 17 as destination therapy due to severe peripheral artery disease with significant stenosis  SIADH, Depression, CKD-3 with hyperkalemia, past E. coli UTIs, driveline drainage (21) and COVID-19 (back in 2020)  He was recently seen in clinic where he complained of abdominal pain and dark stools w constipation back in May. He presents to [facility] ER today weakness and fatigue, moderate and + Black stools for three days, on coumadin secondary to warfarin use in the setting of an LVAD. Patient has required transfusions for GIB in the past. Mostly recently back in 2021 pt had anemia with dark stools. No interventions was done at that time. However Last Endoscopy was done in 2020 (negative). Today labs show patient is anemic with H/H of 4.5/16.3,. INR is 8.84 MAP in the 90s, Temp 35.1. He denies any chest pain, shortness of breath, dizziness, abd pain, nausea or vomiting.       (2021 16:57)      Surgery/Hospital Course:   admit for melena w/ anemia, INR 8.84   6/15 Capsul study (+) for small bowel bleed, balloon endoscopy (old blood in prox ileum); post EGD - septic w/ L opacity, re-intubated for concern for aspiration, TTE (Mod MR, decrease biV w/ interventricular septum boweing towards R)   bronch    +C Diff    CT C/A/P: Fluid filled colon which may be 2/2 rapid transit. Small bilateral pleffs with associates. Compressive atelectasis New ISABELLE & LLL  parenchymal opacities, suspicious for pneumonia. Moderate stenosis in the proximal superior mesenteric artery.    #8 Shiley trach at bedside    LVAD speed increased to 9200   Bronch   TC since . Patient transferred to SDU.    INR today 2.64.  H&H 7.3/24 this AM.  Will repeat CBC at noon, and will send stool guaiac Patient with persistent abdominal tenderness, rate of tube feeds decreased.  No nausea/vomiting.     INR today 2.4. H&H 9.1/28.6 low flow overnight /N&V, refusing Tube feeds on D5 1/2 normal  @50 cc/hr   INR 2.69  H&H 7.7/.1 refusing Tube feeds on D5 1/2 normal  @50 cc/hr. This am + BM Melena Dr Oneill HF  aware- PRBC x1  GI team consulted -  NPO  plan on study in am-  D/w Dr Cadet Patient  to return to CTU for further management; 1PRBCS    Post op INR 2.2 today.  No bleeding. BC + for SM.  Pt is hypotensive requiring pressor and inotropic support.  ID follow up today on Cefepime will follow.   R PTC for PTX    CT C/A/P: sub q emphysema in R chest wall, GGO RUL, small ascites CTH negative; Abd US: GB thickening, pericholecystic fluid     Perchole drain in place continues to drain total output overnight 133.  Fever today 38.8    duplex LE negative    Patient with persistent abdominal pain, refusing tube feeds and medications, Psych consulted   CTA A/P ordered to r/o mesenteric ischemia 2/2 persistent anorexia, nausea, vomiting. Revealed:  Evaluation of the mesenteric vessels is limited by streak artifact from LVAD. There appears to be severe stenosis of the proximal SMA; abdominal mesenteric doppler is recommended for further evaluation. 2.  No small bowel findings to suggest acute mesenteric ischemia. 3.  Focal dissections involving the right and left common iliac arteries.  8/15: Cultures resulted BC 1/2 +GPC in clusters, SC enterobacter; mesenteric duplex: borderline stenosis of proximal SMA  : CT C/A/P noncon: Nondular opacities in R lung apex w/cavitation, abd nl  :  Continue current care, treatment of thyrotoxicosis with medications as per endocrine, d/c ABX as per team.    RUQ sono: Contracted gallbladder with cholecystostomy tube in place.  9/10 failed SMA stent, c/b RP bleed s/p 3U PRCB   CTA Abd/Pelvis L RP hematoma    Trach decannulated    intubated fro resp distress for increased WOB, LL pneumonia; CT C/A/P: progressive ISABELLE opacities and L consolidations, multifocal pneumonia    TTE (EF 25%, decreased RV, mod MR)   10/3 VT -> Lidocaine gtt   10/4 Trach size 6 DCT cuff  10/5 CT abd (neg for intra-abd abcess, severe stenosis of prox SMA and b/l iliac arteries)  10/18 SMA Stent   10/23 Emend for nausea   10/24 +Occult    Today:  Pending stepdown bed.     REVIEW OF SYSTEMS:  Speak over trach   Gen: No fever  EYES/ENT: No visual changes;  No vertigo or throat pain   NECK: No pain   RES:  No shortness of breath or Cough  CARD: No chest pain   GI: +intermittently c/o abd pain and nausea   : No dysuria  NEURO: No weakness  SKIN: No itching, rashes     ICU Vital Signs Last 24 Hrs  T(C): 36.7 (28 Oct 2021 08:00), Max: 36.7 (28 Oct 2021 08:00)  T(F): 98.1 (28 Oct 2021 08:00), Max: 98.1 (28 Oct 2021 08:00)  HR: 118 (28 Oct 2021 08:00) (84 - 137)  BP: --  BP(mean): 69 (28 Oct 2021 08:00) (54 - 69)  ABP: 76/62 (27 Oct 2021 19:00) (72/60 - 101/83)  ABP(mean): 67 (27 Oct 2021 19:00) (66 - 91)  RR: 23 (28 Oct 2021 08:00) (17 - 33)  SpO2: 100% (28 Oct 2021 08:00) (95% - 100%)      Physical Exam:  Gen:  NAD  CNS: intermittently agitated and confused    Neck: no JVD, +trach, +central line   RES : coarse breath sounds, no wheezing    CVS: +LVAD hum   Abd: Soft. Sybil drain with bilious fluid. Positive BS throughout. Mild RUQ abdominal tenderness by perc sybil.  Skin: No rash, erythema, cyanosis.  Vasc: Warm and well-perfused.  Ext:  no edema    ============================I/O===========================   I&O's Detail    27 Oct 2021 07:01  -  28 Oct 2021 07:00  --------------------------------------------------------  IN:    Dexmedetomidine: 63.2 mL    Enteral Tube Flush: 120 mL    Miscellaneous Tube Feedin mL    sodium chloride 0.9%: 45 mL  Total IN: 853.2 mL    OUT:    Drain (mL): 380 mL    Voided (mL): 250 mL  Total OUT: 630 mL    Total NET: 223.2 mL        ============================ LABS =========================                        8.7    14.81 )-----------( 232      ( 28 Oct 2021 00:38 )             26.6     10-28    128<L>  |  94<L>  |  12  ----------------------------<  154<H>  4.0   |  20<L>  |  0.66    Ca    9.6      28 Oct 2021 00:38  Phos  2.8     10-28  Mg     1.8     10-28    TPro  7.2  /  Alb  3.5  /  TBili  0.6  /  DBili  x   /  AST  39  /  ALT  39  /  AlkPhos  192<H>  10-28    LIVER FUNCTIONS - ( 28 Oct 2021 00:38 )  Alb: 3.5 g/dL / Pro: 7.2 g/dL / ALK PHOS: 192 U/L / ALT: 39 U/L / AST: 39 U/L / GGT: x             ABG - ( 27 Oct 2021 17:00 )  pH, Arterial: 7.38  pH, Blood: x     /  pCO2: 40    /  pO2: 135   / HCO3: 24    / Base Excess: -1.3  /  SaO2: 100.0               ======================Micro/Rad/Cardio=================  Culture: Reviewed   CXR: Reviewed  Echo:Reviewed  ======================================================  PAST MEDICAL & SURGICAL HISTORY:  CHF (congestive heart failure)    CAD (coronary artery disease)    Depression    Pleural effusion    History of  novel coronavirus disease (COVID-19)  2020    Hemorrhoids    Bleeding hemorrhoids    Peripheral arterial disease    Claudication    BPH with urinary obstruction    ACC/AHA stage D systolic heart failure    Anticoagulation goal of INR 2.0 to 2.5    Falls    Clavicle fracture    CKD (chronic kidney disease), stage III    Iron deficiency anemia    H/O epistaxis    Vertigo    GI bleed    S/P TVR (tricuspid valve repair)    S/P ventricular assist device    S/P endoscopy      ====================ASSESSMENT ==============  Stage D Nonischemic Cardiomyopathy, Status Post HM2 on 2017    Cardiogenic shock  Hemodynamic instability   Acute hypoxemic respiratory failure s/p trach , decannulated on ; Reintubated on    GI bleed , Status Post Enteroscopy   Anemia, in setting of melena   Chronic Kidney Disease  Stress hyperglycemia   C.diff positive on    Hypovolemic shock  Septic shock  Leukocytosis  GB thickening/percholecystic s/p perc sybil by IR   SMA stenosis s/p SMA Stent on 10/18   Serratia/citrobacter pneumonia   Stenotrophomonas pneumonia   Enterobacter pneumonia   Nasuea/vomiting  Deconditioning  Hyponatremia    DNR / No vent     Plan:  ====================== NEUROLOGY=====================  Intermittently agitated and confused, sedated with IVCD Precedex, c/w 1:1   C/w sertraline, prochlorperazine, and mirtazapine for depression   PRN Fentanyl for pain     dexMEDEtomidine Infusion 0.5 MICROgram(s)/kG/Hr (7.7 mL/Hr) IV Continuous <Continuous>  fentaNYL    Injectable 12 MICROGram(s) IV Push every 3 hours PRN Moderate to severe pain  mirtazapine 7.5 milliGRAM(s) Oral at bedtime  prochlorperazine   Tablet 10 milliGRAM(s) Oral three times a day  sertraline 100 milliGRAM(s) Oral daily    ==================== RESPIRATORY======================  Acute hypoxemic respiratory failure s/p #8 shiley trach on ; decannulated on ; Reintubated on ; trach size 6 DCT cuff on 10/4   Continue close monitoring of respiratory rate and breathing pattern, following of ABG’s with [place] monitoring, continuous pulse oximetry monitoring.   Tolerating TC since 10/19, continue TC as tolerated   Guaifenesin PO PRN mucous plugging     DNR/No vent     Mechanical Ventilation:    guaiFENesin Oral Liquid (Sugar-Free) 200 milliGRAM(s) Oral every 4 hours PRN mucous plugging    ====================CARDIOVASCULAR==================  Stage D Nonischemic Cardiomyopathy, Status Post HM2 on 2017; LVAD settings 9200, flow 5.5   TTE 10/4:  Severely decreased LV systolic function, Diffuse hypokinesis. Mild AR. No pericardial effusion   VT resolved, continue rate control w/ lopressor 12.5q12 started on 10/27   ASA/Plavix for recent SMA stent on hold due to drop in h/h and +occult feces on 10/24    metoprolol tartrate 12.5 milliGRAM(s) Oral every 12 hours    ===================HEMATOLOGIC/ONC ===================  CTA A/P on  +L RP hematoma;   Acute blood loss anemia, monitor H&H/Plts   LUE US on 10/20 with no pseudoaneurysm  ASA/Plavix for recent SMA stent on hold for recent drop in h/h in setting of (+) guaiac (on 10/24)     ===================== RENAL =========================  Hx of CKD, continue monitoring urine output, I&OS, BUN/Cr   Replete lytes PRN. Keep K> 4 and Mg >2  Hyponatremia, NA rising 127->128     ==================== GASTROINTESTINAL===================  CT A/P 10/5 neg for intra-abd abcess, severe stenosis of prox SMA and b/l iliac arteries  CTA A/P : Evaluation of the mesenteric vessels is limited by streak artifact from LVAD. There appears to be severe stenosis of the proximal SMA; abdominal mesenteric doppler is recommended for further evaluation. 2.  No small bowel findings to suggest acute mesenteric ischemia. 3.  Focal dissections involving the right and left common iliac arteries.  Stenosis of proximal SMA, s/p failed SMA stent c/b RP bleed on 9/10 - SMA stent with Vascular 10/18   Simethicone PRN for gas   Reglan for gut motility  Zofran for nausea   +occult feces on 10/24     Tolerating tube feeds, Vital @ 30cc/hr, with goal of 60   FEES with S&S planned for Friday  Pt has speaking valve by bedside     multivitamin 1 Tablet(s) Oral daily  GI prophylaxis, pantoprazole  Injectable 40 milliGRAM(s) IV Push every 12 hours  simethicone 80 milliGRAM(s) Chew every 8 hours PRN Gas  sodium chloride 0.9%. 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   BCx 10/14 and SCX on 10/14 +Stenotrophomonas maltophilia s/p Bactrim   Repeat BCx and Cx of sybil drain sent yesterday, pending results, will f/u         Patient requires continuous monitoring with bedside rhythm monitoring, pulse ox monitoring, and intermittent blood gas analysis. Care plan discussed with ICU care team. Patient remained critical and at risk for life threatening decompensation.     By signing my name below, I, Agnieszka Cherry, attest that this documentation has been prepared under the direction and in the presence of CLAUDIA Lee   Electronically signed: Romel Shaffer, 10-28-21 @ 08:23    I, Georgina Perez, personally performed the services described in this documentation. all medical record entries made by the romel were at my direction and in my presence. I have reviewed the chart and agree that the record reflects my personal performance and is accurate and complete  Electronically signed: CLAUDIA Lee        RICKY HAMPTON  MRN-[id number]  Patient is a 66y old  Male who presents with a chief complaint of Anemia, Supratherapeutic INR, Dark Stools (28 Oct 2021 07:28)    HPI:  65M PMH ACC/AHA stage D HF due to NICM HM2 LVAD , TV annuloplasty ring 17 as destination therapy due to severe peripheral artery disease with significant stenosis  SIADH, Depression, CKD-3 with hyperkalemia, past E. coli UTIs, driveline drainage (21) and COVID-19 (back in 2020)  He was recently seen in clinic where he complained of abdominal pain and dark stools w constipation back in May. He presents to [facility] ER today weakness and fatigue, moderate and + Black stools for three days, on coumadin secondary to warfarin use in the setting of an LVAD. Patient has required transfusions for GIB in the past. Mostly recently back in 2021 pt had anemia with dark stools. No interventions was done at that time. However Last Endoscopy was done in 2020 (negative). Today labs show patient is anemic with H/H of 4.5/16.3,. INR is 8.84 MAP in the 90s, Temp 35.1. He denies any chest pain, shortness of breath, dizziness, abd pain, nausea or vomiting.       (2021 16:57)      Surgery/Hospital Course:   admit for melena w/ anemia, INR 8.84   6/15 Capsul study (+) for small bowel bleed, balloon endoscopy (old blood in prox ileum); post EGD - septic w/ L opacity, re-intubated for concern for aspiration, TTE (Mod MR, decrease biV w/ interventricular septum boweing towards R)   bronch    +C Diff    CT C/A/P: Fluid filled colon which may be 2/2 rapid transit. Small bilateral pleffs with associates. Compressive atelectasis New ISABELLE & LLL  parenchymal opacities, suspicious for pneumonia. Moderate stenosis in the proximal superior mesenteric artery.    #8 Shiley trach at bedside    LVAD speed increased to 9200   Bronch   TC since . Patient transferred to SDU.    INR today 2.64.  H&H 7.3/24 this AM.  Will repeat CBC at noon, and will send stool guaiac Patient with persistent abdominal tenderness, rate of tube feeds decreased.  No nausea/vomiting.     INR today 2.4. H&H 9.1/28.6 low flow overnight /N&V, refusing Tube feeds on D5 1/2 normal  @50 cc/hr   INR 2.69  H&H 7.7/.1 refusing Tube feeds on D5 1/2 normal  @50 cc/hr. This am + BM Melena Dr Oneill HF  aware- PRBC x1  GI team consulted -  NPO  plan on study in am-  D/w Dr Cadet Patient  to return to CTU for further management; 1PRBCS    Post op INR 2.2 today.  No bleeding. BC + for SM.  Pt is hypotensive requiring pressor and inotropic support.  ID follow up today on Cefepime will follow.   R PTC for PTX    CT C/A/P: sub q emphysema in R chest wall, GGO RUL, small ascites CTH negative; Abd US: GB thickening, pericholecystic fluid     Perchole drain in place continues to drain total output overnight 133.  Fever today 38.8    duplex LE negative    Patient with persistent abdominal pain, refusing tube feeds and medications, Psych consulted   CTA A/P ordered to r/o mesenteric ischemia 2/2 persistent anorexia, nausea, vomiting. Revealed:  Evaluation of the mesenteric vessels is limited by streak artifact from LVAD. There appears to be severe stenosis of the proximal SMA; abdominal mesenteric doppler is recommended for further evaluation. 2.  No small bowel findings to suggest acute mesenteric ischemia. 3.  Focal dissections involving the right and left common iliac arteries.  8/15: Cultures resulted BC 1/2 +GPC in clusters, SC enterobacter; mesenteric duplex: borderline stenosis of proximal SMA  : CT C/A/P noncon: Nondular opacities in R lung apex w/cavitation, abd nl  :  Continue current care, treatment of thyrotoxicosis with medications as per endocrine, d/c ABX as per team.    RUQ sono: Contracted gallbladder with cholecystostomy tube in place.  9/10 failed SMA stent, c/b RP bleed s/p 3U PRCB   CTA Abd/Pelvis L RP hematoma    Trach decannulated    intubated fro resp distress for increased WOB, LL pneumonia; CT C/A/P: progressive ISABELLE opacities and L consolidations, multifocal pneumonia    TTE (EF 25%, decreased RV, mod MR)   10/3 VT -> Lidocaine gtt   10/4 Trach size 6 DCT cuff  10/5 CT abd (neg for intra-abd abcess, severe stenosis of prox SMA and b/l iliac arteries)  10/18 SMA Stent   10/23 Emend for nausea   10/24 +Occult    Today:  Intermittently agitated and confused, sedated with IVCD Precedex, on 1:1. Dc'd prochlorperazine this AM, C/w sertraline, mirtazapine for depression. Repeat BCx and Cx of sybil drain sent yesterday, pending results, will f/u. Plan to start Ciprofloxacin and Vancomycin for empiric coverage. Tolerating tube feeds, Vital @ 40cc/hr. FEES with S&S planned for tomorrow. Tolerating TC since 10/19, continue TC as tolerated.      REVIEW OF SYSTEMS:  Speak over trach   Gen: No fever  EYES/ENT: No visual changes;  No vertigo or throat pain   NECK: No pain   RES:  No shortness of breath or Cough  CARD: No chest pain   GI: +intermittently c/o abd pain and nausea   : No dysuria  NEURO: No weakness  SKIN: No itching, rashes     ICU Vital Signs Last 24 Hrs  T(C): 36.7 (28 Oct 2021 08:00), Max: 36.7 (28 Oct 2021 08:00)  T(F): 98.1 (28 Oct 2021 08:00), Max: 98.1 (28 Oct 2021 08:00)  HR: 118 (28 Oct 2021 08:00) (84 - 137)  BP: --  BP(mean): 69 (28 Oct 2021 08:00) (54 - 69)  ABP: 76/62 (27 Oct 2021 19:00) (72/60 - 101/83)  ABP(mean): 67 (27 Oct 2021 19:00) (66 - 91)  RR: 23 (28 Oct 2021 08:00) (17 - 33)  SpO2: 100% (28 Oct 2021 08:00) (95% - 100%)      Physical Exam:  Gen:  NAD  CNS: intermittently agitated and confused    Neck: no JVD, +trach, +central line   RES : coarse breath sounds, no wheezing    CVS: +LVAD hum   Abd: Soft. Sybil drain with bilious fluid. Positive BS throughout. Mild RUQ abdominal tenderness by perc sybil.  Skin: No rash, erythema, cyanosis.  Vasc: Warm and well-perfused.  Ext:  no edema    ============================I/O===========================   I&O's Detail    27 Oct 2021 07:01  -  28 Oct 2021 07:00  --------------------------------------------------------  IN:    Dexmedetomidine: 63.2 mL    Enteral Tube Flush: 120 mL    Miscellaneous Tube Feedin mL    sodium chloride 0.9%: 45 mL  Total IN: 853.2 mL    OUT:    Drain (mL): 380 mL    Voided (mL): 250 mL  Total OUT: 630 mL    Total NET: 223.2 mL        ============================ LABS =========================                        8.7    14.81 )-----------( 232      ( 28 Oct 2021 00:38 )             26.6     10-28    128<L>  |  94<L>  |  12  ----------------------------<  154<H>  4.0   |  20<L>  |  0.66    Ca    9.6      28 Oct 2021 00:38  Phos  2.8     10-28  Mg     1.8     10-28    TPro  7.2  /  Alb  3.5  /  TBili  0.6  /  DBili  x   /  AST  39  /  ALT  39  /  AlkPhos  192<H>  10-28    LIVER FUNCTIONS - ( 28 Oct 2021 00:38 )  Alb: 3.5 g/dL / Pro: 7.2 g/dL / ALK PHOS: 192 U/L / ALT: 39 U/L / AST: 39 U/L / GGT: x             ABG - ( 27 Oct 2021 17:00 )  pH, Arterial: 7.38  pH, Blood: x     /  pCO2: 40    /  pO2: 135   / HCO3: 24    / Base Excess: -1.3  /  SaO2: 100.0               ======================Micro/Rad/Cardio=================  Culture: Reviewed   CXR: Reviewed  Echo:Reviewed  ======================================================  PAST MEDICAL & SURGICAL HISTORY:  CHF (congestive heart failure)    CAD (coronary artery disease)    Depression    Pleural effusion    History of 2019 novel coronavirus disease (COVID-19)  2020    Hemorrhoids    Bleeding hemorrhoids    Peripheral arterial disease    Claudication    BPH with urinary obstruction    ACC/AHA stage D systolic heart failure    Anticoagulation goal of INR 2.0 to 2.5    Falls    Clavicle fracture    CKD (chronic kidney disease), stage III    Iron deficiency anemia    H/O epistaxis    Vertigo    GI bleed    S/P TVR (tricuspid valve repair)    S/P ventricular assist device    S/P endoscopy      ====================ASSESSMENT ==============  Stage D Nonischemic Cardiomyopathy, Status Post HM2 on 2017    Cardiogenic shock  Hemodynamic instability   Acute hypoxemic respiratory failure s/p trach , decannulated on ; Reintubated on    GI bleed , Status Post Enteroscopy   Anemia, in setting of melena   Chronic Kidney Disease  Stress hyperglycemia   C.diff positive on    Hypovolemic shock  Septic shock  Leukocytosis  GB thickening/percholecystic s/p perc sybil by IR   SMA stenosis s/p SMA Stent on 10/18   Serratia/citrobacter pneumonia   Stenotrophomonas pneumonia   Enterobacter pneumonia   Nasuea/vomiting  Deconditioning  Hyponatremia    DNR / No vent     Plan:  ====================== NEUROLOGY=====================  Intermittently agitated and confused, sedated with IVCD Precedex, c/w 1:1   Dc'd prochlorperazine this AM, C/w sertraline, mirtazapine for depression.  PRN Fentanyl for pain     dexMEDEtomidine Infusion 0.5 MICROgram(s)/kG/Hr (7.7 mL/Hr) IV Continuous <Continuous>  fentaNYL    Injectable 12 MICROGram(s) IV Push every 3 hours PRN Moderate to severe pain  mirtazapine 7.5 milliGRAM(s) Oral at bedtime  prochlorperazine   Tablet 10 milliGRAM(s) Oral three times a day  sertraline 100 milliGRAM(s) Oral daily    ==================== RESPIRATORY======================  Acute hypoxemic respiratory failure s/p #8 shiley trach on ; decannulated on ; Reintubated on ; trach size 6 DCT cuff on 10/4   Continue close monitoring of respiratory rate and breathing pattern, following of ABG’s with [place] monitoring, continuous pulse oximetry monitoring.   Tolerating TC since 10/19, continue TC as tolerated   Guaifenesin PO PRN mucous plugging     DNR/No vent     Mechanical Ventilation:    guaiFENesin Oral Liquid (Sugar-Free) 200 milliGRAM(s) Oral every 4 hours PRN mucous plugging    ====================CARDIOVASCULAR==================  Stage D Nonischemic Cardiomyopathy, Status Post HM2 on 2017; LVAD settings 9200, flow 5.5   TTE 10/4:  Severely decreased LV systolic function, Diffuse hypokinesis. Mild AR. No pericardial effusion   VT resolved, continue rate control w/ lopressor 12.5q12 started on 10/27   ASA/Plavix for recent SMA stent on hold due to drop in h/h and +occult feces on 10/24    metoprolol tartrate 12.5 milliGRAM(s) Oral every 12 hours    ===================HEMATOLOGIC/ONC ===================  CTA A/P on  +L RP hematoma;   Acute blood loss anemia, monitor H&H/Plts   LUE US on 10/20 with no pseudoaneurysm  ASA/Plavix for recent SMA stent on hold for recent drop in h/h in setting of (+) guaiac (on 10/24)     ===================== RENAL =========================  Hx of CKD, continue monitoring urine output, I&OS, BUN/Cr   Replete lytes PRN. Keep K> 4 and Mg >2  Hyponatremia, NA rising 127->128     ==================== GASTROINTESTINAL===================  CT A/P 10/5 neg for intra-abd abcess, severe stenosis of prox SMA and b/l iliac arteries  CTA A/P : Evaluation of the mesenteric vessels is limited by streak artifact from LVAD. There appears to be severe stenosis of the proximal SMA; abdominal mesenteric doppler is recommended for further evaluation. 2.  No small bowel findings to suggest acute mesenteric ischemia. 3.  Focal dissections involving the right and left common iliac arteries.  Stenosis of proximal SMA, s/p failed SMA stent c/b RP bleed on 9/10 - SMA stent with Vascular 10/18   Simethicone PRN for gas   Reglan for gut motility  Zofran for nausea   +occult feces on 10/24     Tolerating tube feeds, Vital @ 40cc/hr, with goal of 60   FEES with S&S planned for tomorrow   Pt has speaking valve by bedside     multivitamin 1 Tablet(s) Oral daily  GI prophylaxis, pantoprazole  Injectable 40 milliGRAM(s) IV Push every 12 hours  simethicone 80 milliGRAM(s) Chew every 8 hours PRN Gas  sodium chloride 0.9%. 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   BCx 10/14 and SCX on 10/14 +Stenotrophomonas maltophilia s/p Bactrim   Repeat BCx and Cx of sybil drain sent yesterday, pending results, will f/u   Plan to start Ciprofloxacin and Vancomycin for empiric coverage today       Patient requires continuous monitoring with bedside rhythm monitoring, pulse ox monitoring, and intermittent blood gas analysis. Care plan discussed with ICU care team. Patient remained critical and at risk for life threatening decompensation.     By signing my name below, I, Agnieszka Cherry, attest that this documentation has been prepared under the direction and in the presence of CLAUDIA Lee   Electronically signed: Romel Shaffer, 10-28-21 @ 08:23    I, Georgina Perez, personally performed the services described in this documentation. all medical record entries made by the romel were at my direction and in my presence. I have reviewed the chart and agree that the record reflects my personal performance and is accurate and complete  Electronically signed: CLAUDIA Lee        RICKY HAMPTON  MRN-[id number]  Patient is a 66y old  Male who presents with a chief complaint of Anemia, Supratherapeutic INR, Dark Stools (28 Oct 2021 07:28)    HPI:  65M PMH ACC/AHA stage D HF due to NICM HM2 LVAD , TV annuloplasty ring 17 as destination therapy due to severe peripheral artery disease with significant stenosis  SIADH, Depression, CKD-3 with hyperkalemia, past E. coli UTIs, driveline drainage (21) and COVID-19 (back in 2020)  He was recently seen in clinic where he complained of abdominal pain and dark stools w constipation back in May. He presents to [facility] ER today weakness and fatigue, moderate and + Black stools for three days, on coumadin secondary to warfarin use in the setting of an LVAD. Patient has required transfusions for GIB in the past. Mostly recently back in 2021 pt had anemia with dark stools. No interventions was done at that time. However Last Endoscopy was done in 2020 (negative). Today labs show patient is anemic with H/H of 4.5/16.3,. INR is 8.84 MAP in the 90s, Temp 35.1. He denies any chest pain, shortness of breath, dizziness, abd pain, nausea or vomiting.       (2021 16:57)      Surgery/Hospital Course:   admit for melena w/ anemia, INR 8.84   6/15 Capsul study (+) for small bowel bleed, balloon endoscopy (old blood in prox ileum); post EGD - septic w/ L opacity, re-intubated for concern for aspiration, TTE (Mod MR, decrease biV w/ interventricular septum boweing towards R)   bronch    +C Diff    CT C/A/P: Fluid filled colon which may be 2/2 rapid transit. Small bilateral pleffs with associates. Compressive atelectasis New ISABELLE & LLL  parenchymal opacities, suspicious for pneumonia. Moderate stenosis in the proximal superior mesenteric artery.    #8 Shiley trach at bedside    LVAD speed increased to 9200   Bronch   TC since . Patient transferred to SDU.    INR today 2.64.  H&H 7.3/24 this AM.  Will repeat CBC at noon, and will send stool guaiac Patient with persistent abdominal tenderness, rate of tube feeds decreased.  No nausea/vomiting.     INR today 2.4. H&H 9.1/28.6 low flow overnight /N&V, refusing Tube feeds on D5 1/2 normal  @50 cc/hr   INR 2.69  H&H 7.7/.1 refusing Tube feeds on D5 1/2 normal  @50 cc/hr. This am + BM Melena Dr Oneill HF  aware- PRBC x1  GI team consulted -  NPO  plan on study in am-  D/w Dr Cadet Patient  to return to CTU for further management; 1PRBCS    Post op INR 2.2 today.  No bleeding. BC + for SM.  Pt is hypotensive requiring pressor and inotropic support.  ID follow up today on Cefepime will follow.   R PTC for PTX    CT C/A/P: sub q emphysema in R chest wall, GGO RUL, small ascites CTH negative; Abd US: GB thickening, pericholecystic fluid     Perchole drain in place continues to drain total output overnight 133.  Fever today 38.8    duplex LE negative    Patient with persistent abdominal pain, refusing tube feeds and medications, Psych consulted   CTA A/P ordered to r/o mesenteric ischemia 2/2 persistent anorexia, nausea, vomiting. Revealed:  Evaluation of the mesenteric vessels is limited by streak artifact from LVAD. There appears to be severe stenosis of the proximal SMA; abdominal mesenteric doppler is recommended for further evaluation. 2.  No small bowel findings to suggest acute mesenteric ischemia. 3.  Focal dissections involving the right and left common iliac arteries.  8/15: Cultures resulted BC 1/2 +GPC in clusters, SC enterobacter; mesenteric duplex: borderline stenosis of proximal SMA  : CT C/A/P noncon: Nondular opacities in R lung apex w/cavitation, abd nl  :  Continue current care, treatment of thyrotoxicosis with medications as per endocrine, d/c ABX as per team.    RUQ sono: Contracted gallbladder with cholecystostomy tube in place.  9/10 failed SMA stent, c/b RP bleed s/p 3U PRCB   CTA Abd/Pelvis L RP hematoma    Trach decannulated    intubated fro resp distress for increased WOB, LL pneumonia; CT C/A/P: progressive SIABELLE opacities and L consolidations, multifocal pneumonia    TTE (EF 25%, decreased RV, mod MR)   10/3 VT -> Lidocaine gtt   10/4 Trach size 6 DCT cuff  10/5 CT abd (neg for intra-abd abcess, severe stenosis of prox SMA and b/l iliac arteries)  10/18 SMA Stent   10/23 Emend for nausea   10/24 +Occult      Today:  Intermittently agitated and confused, sedated with IVCD Precedex, wean as tolerated. Dc'd prochlorperazine this AM, C/w sertraline, mirtazapine for depression. Plan to start Ciprofloxacin for empiric coverage. Tolerating tube feeds, Vital @ 40cc/hr. FEES with S&S planned for tomorrow. Tolerating TC since 10/19, continue TC as tolerated.          REVIEW OF SYSTEMS:  Speak over trach   Gen: No fever  EYES/ENT: No visual changes;  No vertigo or throat pain   NECK: No pain   RES:  No shortness of breath or Cough  CARD: No chest pain   GI: +intermittently c/o abd pain and nausea   : No dysuria  NEURO: No weakness  SKIN: No itching, rashes     ICU Vital Signs Last 24 Hrs  T(C): 36.7 (28 Oct 2021 08:00), Max: 36.7 (28 Oct 2021 08:00)  T(F): 98.1 (28 Oct 2021 08:00), Max: 98.1 (28 Oct 2021 08:00)  HR: 118 (28 Oct 2021 08:00) (84 - 137)  BP: --  BP(mean): 69 (28 Oct 2021 08:00) (54 - 69)  ABP: 76/62 (27 Oct 2021 19:00) (72/60 - 101/83)  ABP(mean): 67 (27 Oct 2021 19:00) (66 - 91)  RR: 23 (28 Oct 2021 08:00) (17 - 33)  SpO2: 100% (28 Oct 2021 08:00) (95% - 100%)      Physical Exam:  Gen:  NAD  CNS: intermittently agitated and confused    Neck: no JVD, +trach, +central line   RES : coarse breath sounds, no wheezing    CVS: +LVAD hum   Abd: Soft. Sybil drain with bilious fluid. Positive BS throughout. Mild RUQ abdominal tenderness by perc sybil.  Skin: No rash, erythema, cyanosis.  Vasc: Warm and well-perfused.  Ext:  no edema    ============================I/O===========================   I&O's Detail    27 Oct 2021 07:01  -  28 Oct 2021 07:00  --------------------------------------------------------  IN:    Dexmedetomidine: 63.2 mL    Enteral Tube Flush: 120 mL    Miscellaneous Tube Feedin mL    sodium chloride 0.9%: 45 mL  Total IN: 853.2 mL    OUT:    Drain (mL): 380 mL    Voided (mL): 250 mL  Total OUT: 630 mL    Total NET: 223.2 mL        ============================ LABS =========================                        8.7    14.81 )-----------( 232      ( 28 Oct 2021 00:38 )             26.6     10-28    128<L>  |  94<L>  |  12  ----------------------------<  154<H>  4.0   |  20<L>  |  0.66    Ca    9.6      28 Oct 2021 00:38  Phos  2.8     10-28  Mg     1.8     10-28    TPro  7.2  /  Alb  3.5  /  TBili  0.6  /  DBili  x   /  AST  39  /  ALT  39  /  AlkPhos  192<H>  10-28    LIVER FUNCTIONS - ( 28 Oct 2021 00:38 )  Alb: 3.5 g/dL / Pro: 7.2 g/dL / ALK PHOS: 192 U/L / ALT: 39 U/L / AST: 39 U/L / GGT: x             ABG - ( 27 Oct 2021 17:00 )  pH, Arterial: 7.38  pH, Blood: x     /  pCO2: 40    /  pO2: 135   / HCO3: 24    / Base Excess: -1.3  /  SaO2: 100.0               ======================Micro/Rad/Cardio=================  Culture: Reviewed   CXR: Reviewed  Echo:Reviewed  ======================================================  PAST MEDICAL & SURGICAL HISTORY:  CHF (congestive heart failure)    CAD (coronary artery disease)    Depression    Pleural effusion    History of  novel coronavirus disease (COVID-19)  2020    Hemorrhoids    Bleeding hemorrhoids    Peripheral arterial disease    Claudication    BPH with urinary obstruction    ACC/AHA stage D systolic heart failure    Anticoagulation goal of INR 2.0 to 2.5    Falls    Clavicle fracture    CKD (chronic kidney disease), stage III    Iron deficiency anemia    H/O epistaxis    Vertigo    GI bleed    S/P TVR (tricuspid valve repair)    S/P ventricular assist device    S/P endoscopy      ====================ASSESSMENT ==============  Stage D Nonischemic Cardiomyopathy, Status Post HM2 on 2017    Cardiogenic shock  Hemodynamic instability   Acute hypoxemic respiratory failure s/p trach , decannulated on ; Reintubated on    GI bleed , Status Post Enteroscopy   Anemia, in setting of melena   Chronic Kidney Disease  Stress hyperglycemia   C.diff positive on    Hypovolemic shock  Septic shock  Leukocytosis  GB thickening/percholecystic s/p perc sbyil by IR   SMA stenosis s/p SMA Stent on 10/18   Serratia/citrobacter pneumonia   Stenotrophomonas pneumonia   Enterobacter pneumonia   Nasuea/vomiting  Deconditioning  Hyponatremia    DNR / No vent     Plan:  ====================== NEUROLOGY=====================  Intermittently agitated and confused, sedated with IVCD Precedex, wean as tolerated    Dc'd prochlorperazine this AM, C/w sertraline, mirtazapine for depression.  PRN Fentanyl for pain     dexMEDEtomidine Infusion 0.5 MICROgram(s)/kG/Hr (7.7 mL/Hr) IV Continuous <Continuous>  fentaNYL    Injectable 12 MICROGram(s) IV Push every 3 hours PRN Moderate to severe pain  mirtazapine 7.5 milliGRAM(s) Oral at bedtime  prochlorperazine   Tablet 10 milliGRAM(s) Oral three times a day  sertraline 100 milliGRAM(s) Oral daily    ==================== RESPIRATORY======================  Acute hypoxemic respiratory failure s/p #8 shiley trach on ; decannulated on ; Reintubated on ; trach size 6 DCT cuff on 10/4   Continue close monitoring of respiratory rate and breathing pattern, following of ABG’s with Janet monitoring, continuous pulse oximetry monitoring.   Tolerating TC since 10/19, continue TC as tolerated   Guaifenesin PO PRN mucous plugging     DNR/No vent     Mechanical Ventilation:    guaiFENesin Oral Liquid (Sugar-Free) 200 milliGRAM(s) Oral every 4 hours PRN mucous plugging    ====================CARDIOVASCULAR==================  Stage D Nonischemic Cardiomyopathy, Status Post HM2 on 2017; LVAD settings 9200, flow 5.5   TTE 10/4:  Severely decreased LV systolic function, Diffuse hypokinesis. Mild AR. No pericardial effusion   VT resolved, continue rate control w/ lopressor 12.5q12 started on 10/27   ASA/Plavix for recent SMA stent on hold due to drop in h/h and +occult feces on 10/24    metoprolol tartrate 12.5 milliGRAM(s) Oral every 12 hours    ===================HEMATOLOGIC/ONC ===================  CTA A/P on  +L RP hematoma;   Acute blood loss anemia, monitor H&H/Plts   LUE US on 10/20 with no pseudoaneurysm  ASA/Plavix for recent SMA stent on hold for recent drop in h/h in setting of (+) guaiac (on 10/24)     ===================== RENAL =========================  Hx of CKD, continue monitoring urine output, I&OS, BUN/Cr   Replete lytes PRN. Keep K> 4 and Mg >2  Hyponatremia, NA rising 127->128     ==================== GASTROINTESTINAL===================  CT A/P 10/5 neg for intra-abd abcess, severe stenosis of prox SMA and b/l iliac arteries  CTA A/P : Evaluation of the mesenteric vessels is limited by streak artifact from LVAD. There appears to be severe stenosis of the proximal SMA; abdominal mesenteric doppler is recommended for further evaluation. 2.  No small bowel findings to suggest acute mesenteric ischemia. 3.  Focal dissections involving the right and left common iliac arteries.  Stenosis of proximal SMA, s/p failed SMA stent c/b RP bleed on 9/10 - SMA stent with Vascular 10/18   Simethicone PRN for gas   Reglan for gut motility  Zofran for nausea   +occult feces on 10/24     Tolerating tube feeds, Vital @ 40cc/hr, with goal of 60   FEES with S&S planned for friday    Pt has speaking valve by bedside     multivitamin 1 Tablet(s) Oral daily  GI prophylaxis, pantoprazole  Injectable 40 milliGRAM(s) IV Push every 12 hours  simethicone 80 milliGRAM(s) Chew every 8 hours PRN Gas  sodium chloride 0.9%. 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   BCx 10/14 and SCX on 10/14 +Stenotrophomonas maltophilia s/p Bactrim   Plan to start Ciprofloxacin and Vancomycin for empiric coverage today       Patient requires continuous monitoring with bedside rhythm monitoring, pulse ox monitoring, and intermittent blood gas analysis. Care plan discussed with ICU care team. Patient remained critical and at risk for life threatening decompensation.     By signing my name below, I, Agnieszka Cherry, attest that this documentation has been prepared under the direction and in the presence of CLAUDIA Lee   Electronically signed: Romel Shaffer, 10-28-21 @ 08:23    I, Georgina Perez, personally performed the services described in this documentation. all medical record entries made by the romel were at my direction and in my presence. I have reviewed the chart and agree that the record reflects my personal performance and is accurate and complete  Electronically signed: CLAUDIA Lee

## 2021-10-28 NOTE — PROGRESS NOTE ADULT - ASSESSMENT
Patient is a 66 year old male with PMH of stage D NICM s/p HM2 on 9/2017 as DT (due to severe PAD) with TV ring, prior COVID-19 infection 4/2020, recurrent syncope post LVAD s/p ILR, and chronic abdominal pain with prior negative workup who was admitted 6/14/21 with symptomatic anemia with Hgb 4.5 in setting of INR 8.8 without hemodynamic instability and has since had a prolonged hospital course. The nephrology team was consulted for hyponatremia over the past one week.     Hyponatremia:   - patient admitted 6/14 and has had a prolonged hospital course since with episodes of bacteremia   - hyponatremia with a sodium of 128 this AM; has been stable however with no improvement over the past 3 days  - suspect this is the setting of excess free water with the patient on IVF with D5 as well as receiving bactrim in D5 solution as well, also is on sertraline that can contribute to hyponatremia  - will consider starting pateint on UreNa   - please monitor Ur osm     Patient is a 66 year old male with PMH of stage D NICM s/p HM2 on 9/2017 as DT (due to severe PAD) with TV ring, prior COVID-19 infection 4/2020, recurrent syncope post LVAD s/p ILR, and chronic abdominal pain with prior negative workup who was admitted 6/14/21 with symptomatic anemia with Hgb 4.5 in setting of INR 8.8 without hemodynamic instability and has since had a prolonged hospital course. The nephrology team was consulted for hyponatremia over the past one week.     Hyponatremia:   - patient admitted 6/14 and has had a prolonged hospital course since with episodes of bacteremia   - hyponatremia with a sodium of 128 this AM; has been stable however with no improvement over the past 3 days  - suspect this is the setting of excess free water with the patient on IVF with D5 as well as receiving bactrim in D5 solution as well, also is on sertraline that can contribute to hyponatremia  - can start patient on UreNa 15g daily   - please repeat Ur osm    If any questions, please feel free to contact me     Gary Webb  Nephrology Fellow  [facility] Pager: [phone]

## 2021-10-29 NOTE — SWALLOW FEES ASSESSMENT ADULT - COMMENTS
7/24 low flow overnight /N&V resolved, refusing Tube feeds on D5. 7/25 + BM Melena - PRBC x1, GI consulted - NPO, returned to CTU; 1PRBC. 7/27 BC + for SM. hypotensive requiring pressor and inotropic support. 7/26 R PCT for PTX. 7/28 CT C/A/P: sub q emphysema in R chest wall, GGO RUL, small ascites CTH neg; Abd US: GB thickening, pericholecystic fluid. 7/31 Perchole drain in place continues to drain total output overnight 133. Fever 38.8 given tylenol. 8/7 Persistent abd pain, refusing tube feeds and meds, Psych consulted. 8/8 tolerating TC ATC  8/13 CTA A/P ordered to r/o mesenteric ischemia 2/2 persistent anorexia, nausea, vomiting. There appears to be severe stenosis of proximal SMA; No small bowel findings to suggest acute mesenteric ischemia. Focal dissections involving b/l common iliac arteries. 8/15: Cxs - BC 1/2 +GPC in clusters, SC enterobacter; mesenteric duplex: borderline stenosis of proximal SMA  8/27: CT C/A/P: Nodular opacities in R lung apex w/cavitation, abd nl. 8/31: Tx of thyrotoxicosis with meds as per endocrine, d/c ABX. 9/8 RUQ sono: Contracted gallbladder w/ cholecystostomy tube in place. 9/10 failed SMA stent, c/b RP bleed s/p 3U PRBC. 9/12 CTA A/P L RP hematoma. 9/24 Trach decannulated. 9/26 intubated for resp distress for increased WOB, LLL PNA; CT C/A/P: progressive ISABELLE opacities and L consolidations, multifocal PNA. 9/30 TTE (EF 25%, decreased RV, mod MR). 10/4 Trach size 6 DCT cuff. 10/5 CT abd (neg for intra-abd abcess, severe stenosis of prox SMA and b/l iliac arteries). 10/18 SMA Stent. 10/23 Emend for nausea. 10/24 +Occult, Tolerating TC since 10/19    Followed by this service for PMV trials and dysphagia tx, monitoring for PO candidacy, cleared for use of PMV for 15-20 minute intervals with 100% supervision on 10/26. + KFT and trach in situ  + small glottic gap on phonation  ? mildly reduced R vocal cord movement

## 2021-10-29 NOTE — PROGRESS NOTE ADULT - SUBJECTIVE AND OBJECTIVE BOX
Behavioral Cardiology Progress Note     History of present illness: Mr. Quispe is a 66 year-old man with history of NICM s/p HM2 on 9/2017 as DT (due to severe PAD) with TV ring, prior COVID-19 infection 4/2020, recurrent syncope post LVAD s/p ILR, and chronic abdominal pain with prior negative workup who was admitted with symptomatic anemia with Hgb 4.5 in setting of INR 8.8 without hemodynamic instability. Testing showed active bleeding in the small bowel but subsequent enteroscopy 6/15 did not reveal any active bleeding. He acutely decompensated after procedure with fever/hypertension, low flow alarms, and pulmonary infiltrate with hypoxia requiring intubation from probable aspiration PNA.  Course notable for inability to wean ventilator with persistent secretions for which he underwent tracheostomy as well as acute c diff colitis.     Social history: , lives in his sister’s house in [place]. Has 3 sons (2 live in , 1 in [facility]). One of 3 siblings. Lives in his sister’s house in [place] (Cristina Rudolph [phone]), also in the home are niece ([facility] [phone]) and nephew (Miller Rudolph).  Another sister lives close by in [place] and all other siblings live in [place] which the family visit often.  Worked full time at [employer] in [place], stopped working due to heart disease. Education: high school graduate.     Substance use:   Tobacco: Former smoker, 8-10 cigarettes/day for 30 years.   Alcohol: Past use of 3-4 drinks of Johnson 2x/week. None for past 4 years.   Drug: Denies any drug use.

## 2021-10-29 NOTE — PROGRESS NOTE ADULT - PROBLEM SELECTOR PLAN 1
- ACC/AHA stage D systolic heart failure s/p LVAD   - Currently off all antihypertensive agents, MAPs are at goal (70-80)

## 2021-10-29 NOTE — PROGRESS NOTE ADULT - ASSESSMENT
Mental status exam: Seen sleeping in bed in CTU with trach collar. Woke when name called but fell asleep throughout assessment. Communicated by mouthing words, using hand gestures, shaking head yes/no. Unable to identify date (reported month as May). Unable to assess thought process/content. Mood: gestured so-so. Affect somnolent. Denies symptoms of hopelessness, depression. Denies feeling anxious. Denies SI/HI.     Psychological assessment: Seen sleeping in bed in CTU with trach collar. Woke when name called. As per RN, Mr. Quispe has been noted to have episodes of agitation (pulling at tubes) since yesterday. Today however, he has been mostly sleeping. During evaluation fell asleep mid sentence several times. Unable to identify date (reported month as May). Remebered this writer but could not recall profession. Communicated by mouthing words, using hand gestures, shaking head yes/no. Mood: gestured so-so. Affect somnolent. Denies symptoms of hopelessness, depression. Denies feeling anxious. Denies SI/HI. Denies any abdominal pain. Did endorse left sided hip pain. RN aware.      Dx: Adjustment disorder with anxiety and depressed mood. F43.23 Systolic heart failure I50.2  LVAD Z95.811. Delirium, mixed hypoactive/hyperactive. F05    Recommendations:   Psychiatric follow up for delirium  Reorient frequently while delirium persists  Provide quiet environment   When possible, take outside weather permitting    Provide emotional support   Behavioral Cardiology will follow

## 2021-10-29 NOTE — SWALLOW FEES ASSESSMENT ADULT - NS SWALLOW FEES REC ASPIR MON
Monitor for s/s aspiration/laryngeal penetration. If noted:  D/C p.o. intake, provide non-oral nutrition/hydration/meds, and contact this service @ [phone]/change of breathing pattern/gurgly voice/fever/pneumonia/throat clearing/upper respiratory infection

## 2021-10-29 NOTE — PROGRESS NOTE ADULT - ATTENDING COMMENTS
Delirium improved. Passed speech/swallow and set to get pureed foods today. Rising leukocytosis, will repeat pan-culture and RUQ u/s to make sure gall bladder is decompressed. Hyponatremia improving with urena.

## 2021-10-29 NOTE — PROGRESS NOTE ADULT - PROBLEM SELECTOR PLAN 5
- continues to have worsening leukocytosis, however is afebrile  - currently on Cipro 500 mg PO QD   - new cultures will be taken today. last culture 10/23 negative  - Prior cholecystitis w/ per dawn drain with bilious output, will obtain culture from drain today

## 2021-10-29 NOTE — SWALLOW FEES ASSESSMENT ADULT - DIAGNOSTIC IMPRESSIONS
Pt presents with an oropharyngeal dysphagia notable for prolonged oral phase with piecemeal deglutition and uncontrolled spillover to the pyriform sinuses across textures, trace to mild pharyngeal residue in the valleculae and pyriforms for liquids and purees, evere vallecular residue and mild pyriform residue post soft solids, and laryngeal penetration with thin and nectar-thick liquids, and soft solids. Use of a straw with nectar-thick liquids resulted in deep laryngeal penetration, with residual visible on the surface of the vocal folds, cleared by a cued cough. Laryngeal penetration of thin and nectar-thick liquids via spoonful and cup sip appeared more shallow, with trace residual on the interarytenoid space that does not descend during the study. Pt known to be impulsive with occasional episodes of agitation. Therefore, recommend start with more conservative liquid texture at this time. Pt presents with an oropharyngeal dysphagia notable for prolonged oral phase with piecemeal deglutition and uncontrolled spillover to the pyriform sinuses across textures, trace to mild pharyngeal residue in the valleculae and pyriforms for liquids and purees, evere vallecular residue and mild pyriform residue post soft solids, and laryngeal penetration with thin and nectar-thick liquids, and soft solids. Use of a straw with nectar-thick liquids resulted in deep laryngeal penetration, with residual visible on the surface of the vocal folds, cleared by a cued cough. Laryngeal penetration of thin and nectar-thick liquids via spoonful and cup sip appeared more shallow, with trace residual on the interarytenoid space that does not descend during the study. Pt known to be impulsive with occasional episodes of agitation. Therefore, recommend start with more conservative liquid texture at this time.    Disorders: reduced lingual strength/ROM/Rate of motion, reduced tongue to palate contact, suspected reduced BOT to posterior pharyngeal wall contact, delay in trigger of the swallow reflex, reduced supraglottic sensation.

## 2021-10-29 NOTE — SWALLOW FEES ASSESSMENT ADULT - ADDITIONAL RECOMMENDATIONS
Maintain good oral hygiene.   Restorative swallow therapy. Will continue to follow while patient is in-house, as schedule permits. Thin liquid trials with thin Will consider for liquid upgrade pending tolerance at bedside. Maintain good oral hygiene.   Restorative swallow therapy. Will continue to follow while patient is in-house, as schedule permits. Thin liquid trials with SLP only. Will consider for liquid upgrade pending tolerance at bedside.

## 2021-10-29 NOTE — PROGRESS NOTE ADULT - ATTENDING COMMENTS
normal laryngoscopy, tracheostomy tube visible on scope.  Defer diet to SLP  c/w care per primary team

## 2021-10-29 NOTE — PROGRESS NOTE ADULT - SUBJECTIVE AND OBJECTIVE BOX
RICKY HAMPTON  MRN-[id number]  Patient is a 66y old  Male who presents with a chief complaint of Anemia, Supratherapeutic INR, Dark Stools (29 Oct 2021 07:12)    HPI:  65M PMH ACC/AHA stage D HF due to NICM HM2 LVAD , TV annuloplasty ring 17 as destination therapy due to severe peripheral artery disease with significant stenosis  SIADH, Depression, CKD-3 with hyperkalemia, past E. coli UTIs, driveline drainage (21) and COVID-19 (back in 2020)  He was recently seen in clinic where he complained of abdominal pain and dark stools w constipation back in May. He presents to [facility] ER today weakness and fatigue, moderate and + Black stools for three days, on coumadin secondary to warfarin use in the setting of an LVAD. Patient has required transfusions for GIB in the past. Mostly recently back in 2021 pt had anemia with dark stools. No interventions was done at that time. However Last Endoscopy was done in 2020 (negative). Today labs show patient is anemic with H/H of 4.5/16.3,. INR is 8.84 MAP in the 90s, Temp 35.1. He denies any chest pain, shortness of breath, dizziness, abd pain, nausea or vomiting.       (2021 16:57)      Surgery/Hospital Course:   admit for melena w/ anemia, INR 8.84   6/15 Capsul study (+) for small bowel bleed, balloon endoscopy (old blood in prox ileum); post EGD - septic w/ L opacity, re-intubated for concern for aspiration, TTE (Mod MR, decrease biV w/ interventricular septum boweing towards R)   bronch    +C Diff    CT C/A/P: Fluid filled colon which may be 2/2 rapid transit. Small bilateral pleffs with associates. Compressive atelectasis New ISABELLE & LLL  parenchymal opacities, suspicious for pneumonia. Moderate stenosis in the proximal superior mesenteric artery.    #8 Shiley trach at bedside    LVAD speed increased to 9200   Bronch   TC since . Patient transferred to SDU.    INR today 2.64.  H&H 7.3/24 this AM.  Will repeat CBC at noon, and will send stool guaiac Patient with persistent abdominal tenderness, rate of tube feeds decreased.  No nausea/vomiting.     INR today 2.4. H&H 9.1/28.6 low flow overnight /N&V, refusing Tube feeds on D5 1/2 normal  @50 cc/hr   INR 2.69  H&H 7.7/.1 refusing Tube feeds on D5 1/2 normal  @50 cc/hr. This am + BM Melena Dr Oneill HF  aware- PRBC x1  GI team consulted -  NPO  plan on study in am-  D/w Dr Cadet Patient  to return to CTU for further management; 1PRBCS    Post op INR 2.2 today.  No bleeding. BC + for SM.  Pt is hypotensive requiring pressor and inotropic support.  ID follow up today on Cefepime will follow.   R PTC for PTX    CT C/A/P: sub q emphysema in R chest wall, GGO RUL, small ascites CTH negative; Abd US: GB thickening, pericholecystic fluid     Perchole drain in place continues to drain total output overnight 133.  Fever today 38.8    duplex LE negative    Patient with persistent abdominal pain, refusing tube feeds and medications, Psych consulted   CTA A/P ordered to r/o mesenteric ischemia 2/2 persistent anorexia, nausea, vomiting. Revealed:  Evaluation of the mesenteric vessels is limited by streak artifact from LVAD. There appears to be severe stenosis of the proximal SMA; abdominal mesenteric doppler is recommended for further evaluation. 2.  No small bowel findings to suggest acute mesenteric ischemia. 3.  Focal dissections involving the right and left common iliac arteries.  8/15: Cultures resulted BC 1/2 +GPC in clusters, SC enterobacter; mesenteric duplex: borderline stenosis of proximal SMA  : CT C/A/P noncon: Nondular opacities in R lung apex w/cavitation, abd nl  :  Continue current care, treatment of thyrotoxicosis with medications as per endocrine, d/c ABX as per team.    RUQ sono: Contracted gallbladder with cholecystostomy tube in place.  9/10 failed SMA stent, c/b RP bleed s/p 3U PRCB   CTA Abd/Pelvis L RP hematoma    Trach decannulated    intubated fro resp distress for increased WOB, LL pneumonia; CT C/A/P: progressive ISABELLE opacities and L consolidations, multifocal pneumonia    TTE (EF 25%, decreased RV, mod MR)   10/3 VT -> Lidocaine gtt   10/4 Trach size 6 DCT cuff  10/5 CT abd (neg for intra-abd abcess, severe stenosis of prox SMA and b/l iliac arteries)  10/18 SMA Stent   10/23 Emend for nausea   10/24 +Occult    Today:  FEES with S&S planned     REVIEW OF SYSTEMS:  Speak over trach   Gen: No fever  EYES/ENT: No visual changes;  No vertigo or throat pain   NECK: No pain   RES:  No shortness of breath or Cough  CARD: No chest pain   GI: +intermittently c/o abd pain and nausea   : No dysuria  NEURO: No weakness  SKIN: No itching, rashes     ICU Vital Signs Last 24 Hrs  T(C): 36.1 (29 Oct 2021 04:00), Max: 37.3 (29 Oct 2021 00:00)  T(F): 96.9 (29 Oct 2021 04:00), Max: 99.1 (29 Oct 2021 00:00)  HR: 111 (29 Oct 2021 07:00) (96 - 124)  BP: --  BP(mean): 78 (29 Oct 2021 04:00) (69 - 84)  ABP: --  ABP(mean): --  RR: 33 (29 Oct 2021 07:00) (17 - 40)  SpO2: 87% (29 Oct 2021 07:00) (87% - 100%)      Physical Exam:  Gen:  NAD  CNS: intermittently agitated and confused    Neck: no JVD, +trach, +central line   RES : coarse breath sounds, no wheezing    CVS: +LVAD hum   Abd: Soft. Sybil drain with bilious fluid. Positive BS throughout. Mild RUQ abdominal tenderness by perc sybil.  Skin: No rash, erythema, cyanosis.  Vasc: Warm and well-perfused.  Ext:  no edema    ============================I/O===========================   I&O's Detail    28 Oct 2021 07:01  -  29 Oct 2021 07:00  --------------------------------------------------------  IN:    Dexmedetomidine: 6 mL    Enteral Tube Flush: 150 mL    IV PiggyBack: 100 mL    Miscellaneous Tube Feedin mL  Total IN: 1326 mL    OUT:    Drain (mL): 250 mL    sodium chloride 0.9%: 0 mL    Voided (mL): 445 mL  Total OUT: 695 mL    Total NET: 631 mL        ============================ LABS =========================                        9.7    21.66 )-----------( 292      ( 29 Oct 2021 00:20 )             29.6     10-    130<L>  |  94<L>  |  12  ----------------------------<  136<H>  4.0   |  23  |  0.47<L>    Ca    9.5      29 Oct 2021 00:20  Phos  2.6     10-29  Mg     1.5     10-29    TPro  7.6  /  Alb  3.9  /  TBili  0.7  /  DBili  x   /  AST  74<H>  /  ALT  82<H>  /  AlkPhos  213<H>  10-29    LIVER FUNCTIONS - ( 29 Oct 2021 00:20 )  Alb: 3.9 g/dL / Pro: 7.6 g/dL / ALK PHOS: 213 U/L / ALT: 82 U/L / AST: 74 U/L / GGT: x             ABG - ( 27 Oct 2021 17:00 )  pH, Arterial: 7.38  pH, Blood: x     /  pCO2: 40    /  pO2: 135   / HCO3: 24    / Base Excess: -1.3  /  SaO2: 100.0               ======================Micro/Rad/Cardio=================  Culture: Reviewed   CXR: Reviewed  Echo:Reviewed  ======================================================  PAST MEDICAL & SURGICAL HISTORY:  CHF (congestive heart failure)    CAD (coronary artery disease)    Depression    Pleural effusion    History of  novel coronavirus disease (COVID-19)  2020    Hemorrhoids    Bleeding hemorrhoids    Peripheral arterial disease    Claudication    BPH with urinary obstruction    ACC/AHA stage D systolic heart failure    Anticoagulation goal of INR 2.0 to 2.5    Falls    Clavicle fracture    CKD (chronic kidney disease), stage III    Iron deficiency anemia    H/O epistaxis    Vertigo    GI bleed    S/P TVR (tricuspid valve repair)    S/P ventricular assist device    S/P endoscopy      ====================ASSESSMENT ==============  Stage D Nonischemic Cardiomyopathy, Status Post HM2 on 2017    Cardiogenic shock  Hemodynamic instability   Acute hypoxemic respiratory failure s/p trach , decannulated on ; Reintubated on    GI bleed , Status Post Enteroscopy   Anemia, in setting of melena   Chronic Kidney Disease  Stress hyperglycemia   C.diff positive on    Hypovolemic shock  Septic shock  Leukocytosis  GB thickening/percholecystic s/p perc sybil by IR   SMA stenosis s/p SMA Stent on 10/18   Serratia/citrobacter pneumonia   Stenotrophomonas pneumonia   Enterobacter pneumonia   Nasuea/vomiting  Deconditioning  Hyponatremia      DNR / No vent    Plan:  ====================== NEUROLOGY=====================  Intermittently agitated and confused, on 1:1   Continue close monitoring of neuro status   C/w sertraline, mirtazapine for depression   PRN Fentanyl for pain     fentaNYL    Injectable 12 MICROGram(s) IV Push every 3 hours PRN Moderate to severe pain  mirtazapine 7.5 milliGRAM(s) Oral at bedtime  sertraline 100 milliGRAM(s) Oral daily    ==================== RESPIRATORY======================  Acute hypoxemic respiratory failure s/p #8 shiley trach on ; decannulated on ; Reintubated on ; trach size 6 DCT cuff on 10/4   Continue close monitoring of respiratory rate and breathing pattern, following of ABG’s with [place] monitoring, continuous pulse oximetry monitoring.   Tolerating TC since 10/19, continue TC as tolerated   Guaifenesin PO PRN mucous plugging     DNR/No vent     guaiFENesin Oral Liquid (Sugar-Free) 200 milliGRAM(s) Oral every 4 hours PRN mucous plugging    ====================CARDIOVASCULAR==================  Stage D Nonischemic Cardiomyopathy, Status Post HM2 on 2017; LVAD settings 9200, flow 5.0  TTE 10/4:  Severely decreased LV systolic function, Diffuse hypokinesis. Mild AR. No pericardial effusion   VT resolved, continue rate control w/ Lopressor   ASA/Plavix for recent SMA stent on hold due to drop in h/h and +occult feces on 10/24    metoprolol tartrate 12.5 milliGRAM(s) Oral every 12 hours    ===================HEMATOLOGIC/ONC ===================  CTA A/P on  +L RP hematoma;   Acute blood loss anemia, monitor H&H/Plts   LUE US on 10/20 with no pseudoaneurysm  ASA/Plavix for recent SMA stent on hold for recent drop in h/h in setting of (+) guaiac (on 10/24)     ===================== RENAL =========================  Hx of CKD, continue monitoring urine output, I&OS, BUN/Cr   Replete lytes PRN. Keep K> 4 and Mg >2  Hyponatremia, NA rising 1280->130     ==================== GASTROINTESTINAL===================  CT A/P 10/5 neg for intra-abd abcess, severe stenosis of prox SMA and b/l iliac arteries  CTA A/P : Evaluation of the mesenteric vessels is limited by streak artifact from LVAD. There appears to be severe stenosis of the proximal SMA; abdominal mesenteric doppler is recommended for further evaluation. 2.  No small bowel findings to suggest acute mesenteric ischemia. 3.  Focal dissections involving the right and left common iliac arteries.  Stenosis of proximal SMA, s/p failed SMA stent c/b RP bleed on 9/10 - SMA stent with Vascular 10/18   Simethicone PRN for gas   Reglan for gut motility  Zofran for nausea   +occult feces on 10/24     Tolerating tube feeds, Vital @ 50cc/hr, with goal of 60   FEES with S&S planned for today   Pt has speaking valve by bedside     multivitamin 1 Tablet(s) Oral daily  GI prophylaxis, pantoprazole  Injectable 40 milliGRAM(s) IV Push every 12 hours  simethicone 80 milliGRAM(s) Chew every 8 hours PRN Gas  sodium chloride 0.9%. 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   BCx 10/14 and SCX on 10/14 +Stenotrophomonas maltophilia s/p Bactrim   Empiric coverage with Ciprofloxacin and Vancomycin     ciprofloxacin     Tablet 500 milliGRAM(s) Oral every 12 hours  vancomycin    Solution 125 milliGRAM(s) Oral every 12 hours      Patient requires continuous monitoring with bedside rhythm monitoring, pulse ox monitoring, and intermittent blood gas analysis. Care plan discussed with ICU care team. Patient remained critical and at risk for life threatening decompensation.     By signing my name below, I, Agnieszka Cherry, attest that this documentation has been prepared under the direction and in the presence of CLAUDIA Mejia   Electronically signed: Cesia Shaffer, 10-29-21 @ 07:41    I, Go Sellers, personally performed the services described in this documentation. all medical record entries made by the luisibe were at my direction and in my presence. I have reviewed the chart and agree that the record reflects my personal performance and is accurate and complete  Electronically signed: CLAUDIA Mejia        RICKY HAMPTON  MRN-[id number]  Patient is a 66y old  Male who presents with a chief complaint of Anemia, Supratherapeutic INR, Dark Stools (29 Oct 2021 07:12)    HPI:  65M PMH ACC/AHA stage D HF due to NICM HM2 LVAD , TV annuloplasty ring 17 as destination therapy due to severe peripheral artery disease with significant stenosis  SIADH, Depression, CKD-3 with hyperkalemia, past E. coli UTIs, driveline drainage (21) and COVID-19 (back in 2020)  He was recently seen in clinic where he complained of abdominal pain and dark stools w constipation back in May. He presents to [facility] ER today weakness and fatigue, moderate and + Black stools for three days, on coumadin secondary to warfarin use in the setting of an LVAD. Patient has required transfusions for GIB in the past. Mostly recently back in 2021 pt had anemia with dark stools. No interventions was done at that time. However Last Endoscopy was done in 2020 (negative). Today labs show patient is anemic with H/H of 4.5/16.3,. INR is 8.84 MAP in the 90s, Temp 35.1. He denies any chest pain, shortness of breath, dizziness, abd pain, nausea or vomiting.       (2021 16:57)      Surgery/Hospital Course:   admit for melena w/ anemia, INR 8.84   6/15 Capsul study (+) for small bowel bleed, balloon endoscopy (old blood in prox ileum); post EGD - septic w/ L opacity, re-intubated for concern for aspiration, TTE (Mod MR, decrease biV w/ interventricular septum boweing towards R)   bronch    +C Diff    CT C/A/P: Fluid filled colon which may be 2/2 rapid transit. Small bilateral pleffs with associates. Compressive atelectasis New ISABELLE & LLL  parenchymal opacities, suspicious for pneumonia. Moderate stenosis in the proximal superior mesenteric artery.    #8 Shiley trach at bedside    LVAD speed increased to 9200   Bronch   TC since . Patient transferred to SDU.    INR today 2.64.  H&H 7.3/24 this AM.  Will repeat CBC at noon, and will send stool guaiac Patient with persistent abdominal tenderness, rate of tube feeds decreased.  No nausea/vomiting.     INR today 2.4. H&H 9.1/28.6 low flow overnight /N&V, refusing Tube feeds on D5 1/2 normal  @50 cc/hr   INR 2.69  H&H 7.7/.1 refusing Tube feeds on D5 1/2 normal  @50 cc/hr. This am + BM Melena Dr Oneill HF  aware- PRBC x1  GI team consulted -  NPO  plan on study in am-  D/w Dr Cadet Patient  to return to CTU for further management; 1PRBCS    Post op INR 2.2 today.  No bleeding. BC + for SM.  Pt is hypotensive requiring pressor and inotropic support.  ID follow up today on Cefepime will follow.   R PTC for PTX    CT C/A/P: sub q emphysema in R chest wall, GGO RUL, small ascites CTH negative; Abd US: GB thickening, pericholecystic fluid     Perchole drain in place continues to drain total output overnight 133.  Fever today 38.8    duplex LE negative    Patient with persistent abdominal pain, refusing tube feeds and medications, Psych consulted   CTA A/P ordered to r/o mesenteric ischemia 2/2 persistent anorexia, nausea, vomiting. Revealed:  Evaluation of the mesenteric vessels is limited by streak artifact from LVAD. There appears to be severe stenosis of the proximal SMA; abdominal mesenteric doppler is recommended for further evaluation. 2.  No small bowel findings to suggest acute mesenteric ischemia. 3.  Focal dissections involving the right and left common iliac arteries.  8/15: Cultures resulted BC 1/2 +GPC in clusters, SC enterobacter; mesenteric duplex: borderline stenosis of proximal SMA  : CT C/A/P noncon: Nondular opacities in R lung apex w/cavitation, abd nl  :  Continue current care, treatment of thyrotoxicosis with medications as per endocrine, d/c ABX as per team.    RUQ sono: Contracted gallbladder with cholecystostomy tube in place.  9/10 failed SMA stent, c/b RP bleed s/p 3U PRCB   CTA Abd/Pelvis L RP hematoma    Trach decannulated    intubated fro resp distress for increased WOB, LL pneumonia; CT C/A/P: progressive ISABELLE opacities and L consolidations, multifocal pneumonia    TTE (EF 25%, decreased RV, mod MR)   10/3 VT -> Lidocaine gtt   10/4 Trach size 6 DCT cuff  10/5 CT abd (neg for intra-abd abcess, severe stenosis of prox SMA and b/l iliac arteries)  10/18 SMA Stent   10/23 Emend for nausea   10/24 +Occult    Today:  Tolerating TC since 10/19, continue as tolerated. Tolerated Vital @ 50cc/hr, goal of 60. FEES with S&S today     REVIEW OF SYSTEMS:  Speak over trach   Gen: No fever  EYES/ENT: No visual changes;  No vertigo or throat pain   NECK: No pain   RES:  No shortness of breath or Cough  CARD: No chest pain   GI: +intermittently c/o abd pain and nausea   : No dysuria  NEURO: No weakness  SKIN: No itching, rashes     ICU Vital Signs Last 24 Hrs  T(C): 36.1 (29 Oct 2021 04:00), Max: 37.3 (29 Oct 2021 00:00)  T(F): 96.9 (29 Oct 2021 04:00), Max: 99.1 (29 Oct 2021 00:00)  HR: 111 (29 Oct 2021 07:00) (96 - 124)  BP: --  BP(mean): 78 (29 Oct 2021 04:00) (69 - 84)  ABP: --  ABP(mean): --  RR: 33 (29 Oct 2021 07:00) (17 - 40)  SpO2: 87% (29 Oct 2021 07:00) (87% - 100%)      Physical Exam:  Gen:  NAD  CNS: intermittently agitated and confused    Neck: no JVD, +trach, +central line   RES : coarse breath sounds, no wheezing    CVS: +LVAD hum   Abd: Soft. Sybil drain with bilious fluid. Positive BS throughout. Mild RUQ abdominal tenderness by perc sybil.  Skin: No rash, erythema, cyanosis.  Vasc: Warm and well-perfused.  Ext:  no edema    ============================I/O===========================   I&O's Detail    28 Oct 2021 07:01  -  29 Oct 2021 07:00  --------------------------------------------------------  IN:    Dexmedetomidine: 6 mL    Enteral Tube Flush: 150 mL    IV PiggyBack: 100 mL    Miscellaneous Tube Feedin mL  Total IN: 1326 mL    OUT:    Drain (mL): 250 mL    sodium chloride 0.9%: 0 mL    Voided (mL): 445 mL  Total OUT: 695 mL    Total NET: 631 mL        ============================ LABS =========================                        9.7    21.66 )-----------( 292      ( 29 Oct 2021 00:20 )             29.6     10-    130<L>  |  94<L>  |  12  ----------------------------<  136<H>  4.0   |  23  |  0.47<L>    Ca    9.5      29 Oct 2021 00:20  Phos  2.6     10-29  Mg     1.5     10-29    TPro  7.6  /  Alb  3.9  /  TBili  0.7  /  DBili  x   /  AST  74<H>  /  ALT  82<H>  /  AlkPhos  213<H>  10-29    LIVER FUNCTIONS - ( 29 Oct 2021 00:20 )  Alb: 3.9 g/dL / Pro: 7.6 g/dL / ALK PHOS: 213 U/L / ALT: 82 U/L / AST: 74 U/L / GGT: x             ABG - ( 27 Oct 2021 17:00 )  pH, Arterial: 7.38  pH, Blood: x     /  pCO2: 40    /  pO2: 135   / HCO3: 24    / Base Excess: -1.3  /  SaO2: 100.0               ======================Micro/Rad/Cardio=================  Culture: Reviewed   CXR: Reviewed  Echo:Reviewed  ======================================================  PAST MEDICAL & SURGICAL HISTORY:  CHF (congestive heart failure)    CAD (coronary artery disease)    Depression    Pleural effusion    History of 2019 novel coronavirus disease (COVID-19)  2020    Hemorrhoids    Bleeding hemorrhoids    Peripheral arterial disease    Claudication    BPH with urinary obstruction    ACC/AHA stage D systolic heart failure    Anticoagulation goal of INR 2.0 to 2.5    Falls    Clavicle fracture    CKD (chronic kidney disease), stage III    Iron deficiency anemia    H/O epistaxis    Vertigo    GI bleed    S/P TVR (tricuspid valve repair)    S/P ventricular assist device    S/P endoscopy      ====================ASSESSMENT ==============  Stage D Nonischemic Cardiomyopathy, Status Post HM2 on 2017    Cardiogenic shock  Hemodynamic instability   Acute hypoxemic respiratory failure s/p trach , decannulated on ; Reintubated on    GI bleed , Status Post Enteroscopy   Anemia, in setting of melena   Chronic Kidney Disease  Stress hyperglycemia   C.diff positive on    Hypovolemic shock  Septic shock  Leukocytosis  GB thickening/percholecystic s/p perc sybil by IR   SMA stenosis s/p SMA Stent on 10/18   Serratia/citrobacter pneumonia   Stenotrophomonas pneumonia   Enterobacter pneumonia   Nasuea/vomiting  Deconditioning  Hyponatremia      DNR / No vent    Plan:  ====================== NEUROLOGY=====================  Intermittently agitated and confused, on 1:1   Continue close monitoring of neuro status   C/w sertraline, mirtazapine for depression   PRN Fentanyl for pain     fentaNYL    Injectable 12 MICROGram(s) IV Push every 3 hours PRN Moderate to severe pain  mirtazapine 7.5 milliGRAM(s) Oral at bedtime  sertraline 100 milliGRAM(s) Oral daily    ==================== RESPIRATORY======================  Acute hypoxemic respiratory failure s/p #8 shiley trach on ; decannulated on ; Reintubated on ; trach size 6 DCT cuff on 10/4   Continue close monitoring of respiratory rate and breathing pattern, following of ABG’s with [place] monitoring, continuous pulse oximetry monitoring.   Tolerating TC since 10/19, continue TC as tolerated   Guaifenesin PO PRN mucous plugging     DNR/No vent     guaiFENesin Oral Liquid (Sugar-Free) 200 milliGRAM(s) Oral every 4 hours PRN mucous plugging    ====================CARDIOVASCULAR==================  Stage D Nonischemic Cardiomyopathy, Status Post HM2 on 2017; LVAD settings 9200, flow 5.0  TTE 10/4:  Severely decreased LV systolic function, Diffuse hypokinesis. Mild AR. No pericardial effusion   VT resolved, continue rate control w/ Lopressor   ASA/Plavix for recent SMA stent on hold due to drop in h/h and +occult feces on 10/24    metoprolol tartrate 12.5 milliGRAM(s) Oral every 12 hours    ===================HEMATOLOGIC/ONC ===================  CTA A/P on  +L RP hematoma;   Acute blood loss anemia, monitor H&H/Plts   LUE US on 10/20 with no pseudoaneurysm  ASA/Plavix for recent SMA stent on hold for recent drop in h/h in setting of (+) guaiac (on 10/24)     ===================== RENAL =========================  Hx of CKD, continue monitoring urine output, I&OS, BUN/Cr   Replete lytes PRN. Keep K> 4 and Mg >2  Hyponatremia, NA rising 1280->130     ==================== GASTROINTESTINAL===================  CT A/P 10/5 neg for intra-abd abcess, severe stenosis of prox SMA and b/l iliac arteries  CTA A/P : Evaluation of the mesenteric vessels is limited by streak artifact from LVAD. There appears to be severe stenosis of the proximal SMA; abdominal mesenteric doppler is recommended for further evaluation. 2.  No small bowel findings to suggest acute mesenteric ischemia. 3.  Focal dissections involving the right and left common iliac arteries.  Stenosis of proximal SMA, s/p failed SMA stent c/b RP bleed on 9/10 - SMA stent with Vascular 10/18   Simethicone PRN for gas   Reglan for gut motility  Zofran for nausea   +occult feces on 10/24     Tolerating tube feeds, Vital @ 50cc/hr, with goal of 60   FEES with S&S planned for today   Pt has speaking valve by bedside     multivitamin 1 Tablet(s) Oral daily  GI prophylaxis, pantoprazole  Injectable 40 milliGRAM(s) IV Push every 12 hours  simethicone 80 milliGRAM(s) Chew every 8 hours PRN Gas  sodium chloride 0.9%. 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   BCx 10/14 and SCX on 10/14 +Stenotrophomonas maltophilia s/p Bactrim   Empiric coverage with Ciprofloxacin and Vancomycin     ciprofloxacin     Tablet 500 milliGRAM(s) Oral every 12 hours  vancomycin    Solution 125 milliGRAM(s) Oral every 12 hours      Patient requires continuous monitoring with bedside rhythm monitoring, pulse ox monitoring, and intermittent blood gas analysis. Care plan discussed with ICU care team. Patient remained critical and at risk for life threatening decompensation.     By signing my name below, I, Agnieszka Cherry, attest that this documentation has been prepared under the direction and in the presence of CLAUDIA Mejia   Electronically signed: Romel Shaffer, 10-29-21 @ 07:41    I, Go Sellers, personally performed the services described in this documentation. all medical record entries made by the romel were at my direction and in my presence. I have reviewed the chart and agree that the record reflects my personal performance and is accurate and complete  Electronically signed: CLAUDIA Mejia        RICKY HAMPTON  MRN-[id number]  Patient is a 66y old  Male who presents with a chief complaint of Anemia, Supratherapeutic INR, Dark Stools (29 Oct 2021 07:12)    HPI:  65M PMH ACC/AHA stage D HF due to NICM HM2 LVAD , TV annuloplasty ring 17 as destination therapy due to severe peripheral artery disease with significant stenosis  SIADH, Depression, CKD-3 with hyperkalemia, past E. coli UTIs, driveline drainage (21) and COVID-19 (back in 2020)  He was recently seen in clinic where he complained of abdominal pain and dark stools w constipation back in May. He presents to [facility] ER today weakness and fatigue, moderate and + Black stools for three days, on coumadin secondary to warfarin use in the setting of an LVAD. Patient has required transfusions for GIB in the past. Mostly recently back in 2021 pt had anemia with dark stools. No interventions was done at that time. However Last Endoscopy was done in 2020 (negative). Today labs show patient is anemic with H/H of 4.5/16.3,. INR is 8.84 MAP in the 90s, Temp 35.1. He denies any chest pain, shortness of breath, dizziness, abd pain, nausea or vomiting.     (2021 16:57)    Surgery/Hospital Course:   admit for melena w/ anemia, INR 8.84   6/15 Capsul study (+) for small bowel bleed, balloon endoscopy (old blood in prox ileum); post EGD - septic w/ L opacity, re-intubated for concern for aspiration, TTE (Mod MR, decrease biV w/ interventricular septum boweing towards R)   bronch    +C Diff    CT C/A/P: Fluid filled colon which may be 2/2 rapid transit. Small bilateral pleffs with associates. Compressive atelectasis New ISABELLE & LLL  parenchymal opacities, suspicious for pneumonia. Moderate stenosis in the proximal superior mesenteric artery.    #8 Shiley trach at bedside    LVAD speed increased to 9200   Bronch   TC since . Patient transferred to SDU.    INR today 2.64.  H&H 7.3/24 this AM.  Will repeat CBC at noon, and will send stool guaiac Patient with persistent abdominal tenderness, rate of tube feeds decreased.  No nausea/vomiting.     INR today 2.4. H&H 9.1/28.6 low flow overnight /N&V, refusing Tube feeds on D5 1/2 normal  @50 cc/hr   INR 2.69  H&H 7.7/.1 refusing Tube feeds on D5 1/2 normal  @50 cc/hr. This am + BM Melena Dr Oneill HF  aware- PRBC x1  GI team consulted -  NPO  plan on study in am-  D/w Dr Cadet Patient  to return to CTU for further management; 1PRBCS    Post op INR 2.2 today.  No bleeding. BC + for SM.  Pt is hypotensive requiring pressor and inotropic support.  ID follow up today on Cefepime will follow.   R PTC for PTX    CT C/A/P: sub q emphysema in R chest wall, GGO RUL, small ascites CTH negative; Abd US: GB thickening, pericholecystic fluid     Perchole drain in place continues to drain total output overnight 133.  Fever today 38.8    duplex LE negative    Patient with persistent abdominal pain, refusing tube feeds and medications, Psych consulted   CTA A/P ordered to r/o mesenteric ischemia 2/2 persistent anorexia, nausea, vomiting. Revealed:  Evaluation of the mesenteric vessels is limited by streak artifact from LVAD. There appears to be severe stenosis of the proximal SMA; abdominal mesenteric doppler is recommended for further evaluation. 2.  No small bowel findings to suggest acute mesenteric ischemia. 3.  Focal dissections involving the right and left common iliac arteries.  8/15: Cultures resulted BC 1/2 +GPC in clusters, SC enterobacter; mesenteric duplex: borderline stenosis of proximal SMA  : CT C/A/P noncon: Nondular opacities in R lung apex w/cavitation, abd nl  :  Continue current care, treatment of thyrotoxicosis with medications as per endocrine, d/c ABX as per team.    RUQ sono: Contracted gallbladder with cholecystostomy tube in place.  9/10 failed SMA stent, c/b RP bleed s/p 3U PRCB   CTA Abd/Pelvis L RP hematoma    Trach decannulated    intubated fro resp distress for increased WOB, LL pneumonia; CT C/A/P: progressive ISABELLE opacities and L consolidations, multifocal pneumonia    TTE (EF 25%, decreased RV, mod MR)   10/3 VT -> Lidocaine gtt   10/4 Trach size 6 DCT cuff  10/5 CT abd (neg for intra-abd abcess, severe stenosis of prox SMA and b/l iliac arteries)  10/18 SMA Stent   10/23 Emend for nausea   10/24 +Occult    Today:  Tolerating TC since 10/19, continue as tolerated. Tolerated Vital @ 50cc/hr, goal of 60. FEES with S&S today     REVIEW OF SYSTEMS:  Speak over trach   Gen: No fever  EYES/ENT: No visual changes;  No vertigo or throat pain   NECK: No pain   RES:  No shortness of breath or Cough  CARD: No chest pain   GI: +intermittently c/o abd pain and nausea   : No dysuria  NEURO: No weakness  SKIN: No itching, rashes     ICU Vital Signs Last 24 Hrs  T(C): 36.1 (29 Oct 2021 04:00), Max: 37.3 (29 Oct 2021 00:00)  T(F): 96.9 (29 Oct 2021 04:00), Max: 99.1 (29 Oct 2021 00:00)  HR: 111 (29 Oct 2021 07:00) (96 - 124)  BP(mean): 78 (29 Oct 2021 04:00) (69 - 84)  RR: 33 (29 Oct 2021 07:00) (17 - 40)  SpO2: 87% (29 Oct 2021 07:00) (87% - 100%)    Physical Exam:  Gen:  NAD  CNS: intermittently agitated and confused    Neck: no JVD, +trach, +central line   RES : coarse breath sounds, no wheezing    CVS: +LVAD hum   Abd: Soft. Sybil drain with bilious fluid. Positive BS throughout. Mild RUQ abdominal tenderness by perc sybil.  Skin: No rash, erythema, cyanosis.  Vasc: Warm and well-perfused.  Ext:  no edema    ============================I/O===========================   I&O's Detail    28 Oct 2021 07:01  -  29 Oct 2021 07:00  --------------------------------------------------------  IN:    Dexmedetomidine: 6 mL    Enteral Tube Flush: 150 mL    IV PiggyBack: 100 mL    Miscellaneous Tube Feedin mL  Total IN: 1326 mL    OUT:    Drain (mL): 250 mL    sodium chloride 0.9%: 0 mL    Voided (mL): 445 mL  Total OUT: 695 mL    Total NET: 631 mL    ============================ LABS =========================                        9.7    21.66 )-----------( 292      ( 29 Oct 2021 00:20 )             29.6     130<L>  |  94<L>  |  12  ----------------------------<  136<H>  4.0   |  23  |  0.47<L>    Ca    9.5      29 Oct 2021 00:20  Phos  2.6     10-  Mg     1.5     10-29    TPro  7.6  /  Alb  3.9  /  TBili  0.7  /  DBili  x   /  AST  74<H>  /  ALT  82<H>  /  AlkPhos  213<H>  10-29    LIVER FUNCTIONS - ( 29 Oct 2021 00:20 )  Alb: 3.9 g/dL / Pro: 7.6 g/dL / ALK PHOS: 213 U/L / ALT: 82 U/L / AST: 74 U/L / GGT: x           ABG - ( 27 Oct 2021 17:00 )  pH, Arterial: 7.38  pH, Blood: x     /  pCO2: 40    /  pO2: 135   / HCO3: 24    / Base Excess: -1.3  /  SaO2: 100.0     ======================Micro/Rad/Cardio=================  Culture: Reviewed   CXR: Reviewed  Echo:Reviewed    ======================================================  PAST MEDICAL & SURGICAL HISTORY:  CHF (congestive heart failure)    CAD (coronary artery disease)    Depression    Pleural effusion    History of  novel coronavirus disease (COVID-19)  2020    Hemorrhoids    Bleeding hemorrhoids    Peripheral arterial disease    Claudication    BPH with urinary obstruction    ACC/AHA stage D systolic heart failure    Anticoagulation goal of INR 2.0 to 2.5    Falls    Clavicle fracture    CKD (chronic kidney disease), stage III    Iron deficiency anemia    H/O epistaxis    Vertigo    GI bleed    S/P TVR (tricuspid valve repair)    S/P ventricular assist device    S/P endoscopy    ====================ASSESSMENT ==============  Stage D Nonischemic Cardiomyopathy, Status Post HM2 on 2017    Cardiogenic shock  Hemodynamic instability   Acute hypoxemic respiratory failure s/p trach , decannulated on ; Reintubated on    GI bleed , Status Post Enteroscopy   Anemia, in setting of melena   Chronic Kidney Disease  Stress hyperglycemia   C.diff positive on    Hypovolemic shock  Septic shock  Leukocytosis  GB thickening/percholecystic s/p perc sybil by IR   SMA stenosis s/p SMA Stent on 10/18   Serratia/citrobacter pneumonia   Stenotrophomonas pneumonia   Enterobacter pneumonia   Nasuea/vomiting  Deconditioning  Hyponatremia    DNR / No vent    Plan:  ====================== NEUROLOGY=====================  Intermittently agitated and confused, on 1:1   Continue close monitoring of neuro status   C/w sertraline, mirtazapine for depression   PRN Fentanyl for pain     fentaNYL    Injectable 12 MICROGram(s) IV Push every 3 hours PRN Moderate to severe pain  mirtazapine 7.5 milliGRAM(s) Oral at bedtime  sertraline 100 milliGRAM(s) Oral daily    ==================== RESPIRATORY======================  Acute hypoxemic respiratory failure s/p #8 shiley trach on ; decannulated on ; Reintubated on ; trach size 6 DCT cuff on 10/4   Continue close monitoring of respiratory rate and breathing pattern, following of ABG’s with [place] monitoring, continuous pulse oximetry monitoring.   Tolerating TC since 10/19, continue TC as tolerated   Guaifenesin PO PRN mucous plugging     DNR/No vent     guaiFENesin Oral Liquid (Sugar-Free) 200 milliGRAM(s) Oral every 4 hours PRN mucous plugging    ====================CARDIOVASCULAR==================  Stage D Nonischemic Cardiomyopathy, Status Post HM2 on 2017; LVAD settings 9200, flow 5.0  TTE 10/4:  Severely decreased LV systolic function, Diffuse hypokinesis. Mild AR. No pericardial effusion   VT resolved, continue rate control w/ Lopressor   ASA/Plavix for recent SMA stent on hold due to drop in h/h and +occult feces on 10/24    metoprolol tartrate 12.5 milliGRAM(s) Oral every 12 hours    ===================HEMATOLOGIC/ONC ===================  CTA A/P on  +L RP hematoma;   Acute blood loss anemia, monitor H&H/Plts   LUE US on 10/20 with no pseudoaneurysm  ASA/Plavix for recent SMA stent on hold for recent drop in h/h in setting of (+) guaiac (on 10/24)     ===================== RENAL =========================  Hx of CKD, continue monitoring urine output, I&OS, BUN/Cr   Replete lytes PRN. Keep K> 4 and Mg >2  Hyponatremia, NA rising 1280->130     ==================== GASTROINTESTINAL===================  CT A/P 10/5 neg for intra-abd abcess, severe stenosis of prox SMA and b/l iliac arteries  CTA A/P : Evaluation of the mesenteric vessels is limited by streak artifact from LVAD. There appears to be severe stenosis of the proximal SMA; abdominal mesenteric doppler is recommended for further evaluation. 2.  No small bowel findings to suggest acute mesenteric ischemia. 3.  Focal dissections involving the right and left common iliac arteries.  Stenosis of proximal SMA, s/p failed SMA stent c/b RP bleed on 9/10 - SMA stent with Vascular 10/18   Simethicone PRN for gas   Reglan for gut motility  Zofran for nausea   +occult feces on 10/24   Plan to repeat RUQ U/S to assess GB with perc sybil drain intact to ensure it is still decompressed in light of worsening leukocytosis    Tolerating tube feeds, Vital @ 50cc/hr, with goal of 60   FEES with S&S planned for today   Pt has speaking valve by bedside     multivitamin 1 Tablet(s) Oral daily  GI prophylaxis, pantoprazole  Injectable 40 milliGRAM(s) IV Push every 12 hours  simethicone 80 milliGRAM(s) Chew every 8 hours PRN Gas  sodium chloride 0.9%. 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   BCx 10/14 and SCX on 10/14 +Stenotrophomonas maltophilia s/p Bactrim   Empiric coverage with Ciprofloxacin and Vancomycin     ciprofloxacin     Tablet 500 milliGRAM(s) Oral every 12 hours  vancomycin    Solution 125 milliGRAM(s) Oral every 12 hours    I have spent 35 minutes of noncontinuous critical care time with this patient.    Patient requires continuous monitoring with bedside rhythm monitoring, pulse ox monitoring, and intermittent blood gas analysis. Care plan discussed with ICU care team. Patient remained critical and at risk for life threatening decompensation.     By signing my name below, I, Agnieszka Cherry, attest that this documentation has been prepared under the direction and in the presence of CLAUDIA Mejia   Electronically signed: Romel Shaffer, 10-29-21 @ 07:41    I, Go Sellers, personally performed the services described in this documentation. all medical record entries made by the romel were at my direction and in my presence. I have reviewed the chart and agree that the record reflects my personal performance and is accurate and complete  Electronically signed: CLAUDIA Mejia

## 2021-10-29 NOTE — PROGRESS NOTE ADULT - PROBLEM SELECTOR PLAN 8
- overall improving, will continue to monitor  - Nephrology consulted and appreciate recommendations  - currently on Urea 15 mg PO QD

## 2021-10-29 NOTE — SWALLOW FEES ASSESSMENT ADULT - ROSENBEK'S PENETRATION ASPIRATION SCALE
(2) material enters airway, remains above the vocal cords, no residue remains (penetration) with single straw sip; 2= enters airway, remains above the vocal cords, no residue remains for cup sips and teaspoon delivery/(5) material contacts vocal cords, visible residue remains (penetration) (1) no aspiration, material does not enter airway

## 2021-10-29 NOTE — SWALLOW FEES ASSESSMENT ADULT - H & P REVIEW
yes 66M PMH ACC/AHA stage D HF due to NICM HM2 LVAD , TV annuloplasty ring 9/12/17 as destination therapy due to severe peripheral artery disease with significant stenosis  SIADH, Depression, CKD-3 with hyperkalemia, past E. coli UTIs, driveline drainage (1/7/21) and COVID-19 (April 2020). Seen in clinic for c/o abdominal pain and dark stools w constipation in May. He presents to [facility] ER 6/14 w/ weakness, fatigue, and + Black stools x3 days, warfarin use in setting of  LVAD. Pt has required transfusions for GIB in past. Most recently in jan 2021 pt had anemia with dark stools. No interventions at that time. Last Endoscopy done in July 2020 (negative). 6/14: admitted for melena w/ anemia, INR 8.84/yes

## 2021-10-29 NOTE — PROGRESS NOTE ADULT - SUBJECTIVE AND OBJECTIVE BOX
Subjective: Patient seen and examined resting in bed.     Medications:  chlorhexidine 2% Cloths 1 Application(s) Topical <User Schedule>  ciprofloxacin     Tablet 500 milliGRAM(s) Oral every 12 hours  fentaNYL    Injectable 12 MICROGram(s) IV Push every 3 hours PRN  guaiFENesin Oral Liquid (Sugar-Free) 200 milliGRAM(s) Oral every 4 hours PRN  insulin lispro (ADMELOG) corrective regimen sliding scale   SubCutaneous every 6 hours  methimazole 15 milliGRAM(s) Oral daily  metoclopramide Injectable 10 milliGRAM(s) IV Push every 8 hours  metoprolol tartrate 12.5 milliGRAM(s) Oral every 12 hours  mirtazapine 7.5 milliGRAM(s) Oral at bedtime  multivitamin 1 Tablet(s) Oral daily  ondansetron Injectable 8 milliGRAM(s) IV Push every 8 hours  pantoprazole  Injectable 40 milliGRAM(s) IV Push every 12 hours  predniSONE   Tablet 3 milliGRAM(s) Oral daily  sertraline 100 milliGRAM(s) Oral daily  simethicone 80 milliGRAM(s) Chew every 8 hours PRN  sodium chloride 0.9%. 1000 milliLiter(s) IV Continuous <Continuous>  thiamine 100 milliGRAM(s) Oral daily  urea Oral Powder 15 Gram(s) Oral daily  vancomycin    Solution 125 milliGRAM(s) Oral every 12 hours    ICU Vital Signs Last 24 Hrs  T(C): 36.7 (29 Oct 2021 08:00), Max: 37.3 (29 Oct 2021 00:00)  T(F): 98 (29 Oct 2021 08:00), Max: 99.1 (29 Oct 2021 00:00)  HR: 101 (29 Oct 2021 11:00) (94 - 124)  BP: --  BP(mean): 80 (29 Oct 2021 08:00) (72 - 84)  ABP: --  ABP(mean): --  RR: 25 (29 Oct 2021 11:00) (17 - 40)  SpO2: 99% (29 Oct 2021 11:00) (87% - 100%)      Weight in k.2 (10-29 @ 00:00)    I&O's Summary    28 Oct 2021 07:01  -  29 Oct 2021 07:00  --------------------------------------------------------  IN: 1326 mL / OUT: 695 mL / NET: 631 mL    29 Oct 2021 07:01  -  29 Oct 2021 11:28  --------------------------------------------------------  IN: 50 mL / OUT: 100 mL / NET: -50 mL        Physical Exam  General: No distress. Comfortable.  Neck: +trach collar   Chest: Clear to auscultation bilaterally  CV: +VAD hum  Abdomen: Soft, non-distended, some tenderness noted over epigastric region   Neurology: Alert and oriented times threel    LVAD Interrogation  VAD TYPE HM 2  Speed 9200  Flow 5.9  Power 5.7  PI  6  Assessment of driveline exit site: driveline dressing c/d/i  Programming changes: no changes made     Labs:                        9.7    21.66 )-----------( 292      ( 29 Oct 2021 00:20 )             29.6     10    130<L>  |  94<L>  |  12  ----------------------------<  136<H>  4.0   |  23  |  0.47<L>    Ca    9.5      29 Oct 2021 00:20  Phos  2.6     10-29  Mg     1.5     10-29    TPro  7.6  /  Alb  3.9  /  TBili  0.7  /  DBili  x   /  AST  74<H>  /  ALT  82<H>  /  AlkPhos  213<H>  10-29              Lactate Dehydrogenase, Serum: 308 U/L (10-29 @ 00:20)  Lactate Dehydrogenase, Serum: 349 U/L (10-28 @ 00:38)  Lactate Dehydrogenase, Serum: 262 U/L (10-27 @ 00:24)

## 2021-10-29 NOTE — PROGRESS NOTE ADULT - SUBJECTIVE AND OBJECTIVE BOX
ENT ISSUE/POD: dysphagia     HPI: Patient is a 66 year old male with PMH of stage D NICM s/p HM2 on 9/2017 as DT (due to severe PAD) with TV ring, prior COVID-19 infection 4/2020, recurrent syncope post LVAD s/p ILR, and chronic abdominal pain with prior negative workup who was admitted 6/14/21 with symptomatic anemia with Hgb 4.5 in setting of INR 8.8 without hemodynamic instability and has since had a prolonged hospital course. trached 10/4, currently with #6DCTth speaking valve.       PAST MEDICAL & SURGICAL HISTORY:  CHF (congestive heart failure)    CAD (coronary artery disease)    Depression    Pleural effusion    History of 2019 novel coronavirus disease (COVID-19)  april 2020    Hemorrhoids    Bleeding hemorrhoids    Peripheral arterial disease    Claudication    BPH with urinary obstruction    ACC/AHA stage D systolic heart failure    Anticoagulation goal of INR 2.0 to 2.5    Falls    Clavicle fracture    CKD (chronic kidney disease), stage III    Iron deficiency anemia    H/O epistaxis    Vertigo    GI bleed    S/P TVR (tricuspid valve repair)    S/P ventricular assist device    S/P endoscopy      Allergies    Amiodarone Hydrochloride (Other (Severe))    Intolerances      MEDICATIONS  (STANDING):  chlorhexidine 2% Cloths 1 Application(s) Topical <User Schedule>  ciprofloxacin     Tablet 500 milliGRAM(s) Oral every 12 hours  insulin lispro (ADMELOG) corrective regimen sliding scale   SubCutaneous every 6 hours  methimazole 15 milliGRAM(s) Oral daily  metoclopramide Injectable 10 milliGRAM(s) IV Push every 8 hours  metoprolol tartrate 12.5 milliGRAM(s) Oral every 12 hours  mirtazapine 7.5 milliGRAM(s) Oral at bedtime  multivitamin 1 Tablet(s) Oral daily  ondansetron Injectable 8 milliGRAM(s) IV Push every 8 hours  pantoprazole  Injectable 40 milliGRAM(s) IV Push every 12 hours  predniSONE   Tablet 3 milliGRAM(s) Oral daily  sertraline 100 milliGRAM(s) Oral daily  sodium chloride 0.9%. 1000 milliLiter(s) (10 mL/Hr) IV Continuous <Continuous>  thiamine 100 milliGRAM(s) Oral daily  urea Oral Powder 15 Gram(s) Oral daily  vancomycin    Solution 125 milliGRAM(s) Oral every 12 hours    MEDICATIONS  (PRN):  fentaNYL    Injectable 12 MICROGram(s) IV Push every 3 hours PRN Moderate to severe pain  guaiFENesin Oral Liquid (Sugar-Free) 200 milliGRAM(s) Oral every 4 hours PRN mucous plugging  simethicone 80 milliGRAM(s) Chew every 8 hours PRN Gas      social history: see consult     family history: see consult   ROS:   ENT: all negative except as noted in HPI   Pulm: denies SOB, cough, hemoptysis  Neuro: denies numbness/tingling, loss of sensation  Endo: denies heat/cold intolerance, excessive sweating      Vital Signs Last 24 Hrs  T(C): 36.7 (29 Oct 2021 08:00), Max: 37.3 (29 Oct 2021 00:00)  T(F): 98 (29 Oct 2021 08:00), Max: 99.1 (29 Oct 2021 00:00)  HR: 101 (29 Oct 2021 11:00) (94 - 124)  BP: --  BP(mean): 80 (29 Oct 2021 08:00) (72 - 84)  RR: 25 (29 Oct 2021 11:00) (17 - 40)  SpO2: 99% (29 Oct 2021 11:00) (87% - 100%)                          9.7    21.66 )-----------( 292      ( 29 Oct 2021 00:20 )             29.6    10-29    130<L>  |  94<L>  |  12  ----------------------------<  136<H>  4.0   |  23  |  0.47<L>    Ca    9.5      29 Oct 2021 00:20  Phos  2.6     10-29  Mg     1.5     10-29    TPro  7.6  /  Alb  3.9  /  TBili  0.7  /  DBili  x   /  AST  74<H>  /  ALT  82<H>  /  AlkPhos  213<H>  10-29       PHYSICAL EXAM:  Gen: NAD  Skin: No rashes, bruises, or lesions  Head: Normocephalic, Atraumatic  Face: no edema, erythema, or fluctuance. Parotid glands soft without mass  Eyes: no scleral injection  Nose: Nares bilaterally patent, no discharge  Mouth: No Stridor / Drooling / Trismus.  Mucosa moist, tongue/uvula midline, oropharynx clear  Neck: Flat, supple, no lymphadenopathy, trachea midline, no masses  Lymphatic: No lymphadenopathy  Resp: breathing easily, no stridor  Neuro: facial nerve intact, no facial droop         ENT ISSUE/POD: dysphagia     HPI: Patient is a 66 year old male with PMH of stage D NICM s/p HM2 on 9/2017 as DT (due to severe PAD) with TV ring, prior COVID-19 infection 4/2020, recurrent syncope post LVAD s/p ILR, and chronic abdominal pain with prior negative workup who was admitted 6/14/21 with symptomatic anemia with Hgb 4.5 in setting of INR 8.8 without hemodynamic instability and has since had a prolonged hospital course. trached 10/4, currently with #6DCT with speaking valve. c/o dysphagia. Pt denies odynophagia, dysphonia, sob, dyspnea, changes in voice or inability to tolerated secretions         PAST MEDICAL & SURGICAL HISTORY:  CHF (congestive heart failure)    CAD (coronary artery disease)    Depression    Pleural effusion    History of 2019 novel coronavirus disease (COVID-19)  april 2020    Hemorrhoids    Bleeding hemorrhoids    Peripheral arterial disease    Claudication    BPH with urinary obstruction    ACC/AHA stage D systolic heart failure    Anticoagulation goal of INR 2.0 to 2.5    Falls    Clavicle fracture    CKD (chronic kidney disease), stage III    Iron deficiency anemia    H/O epistaxis    Vertigo    GI bleed    S/P TVR (tricuspid valve repair)    S/P ventricular assist device    S/P endoscopy      Allergies    Amiodarone Hydrochloride (Other (Severe))    Intolerances      MEDICATIONS  (STANDING):  chlorhexidine 2% Cloths 1 Application(s) Topical <User Schedule>  ciprofloxacin     Tablet 500 milliGRAM(s) Oral every 12 hours  insulin lispro (ADMELOG) corrective regimen sliding scale   SubCutaneous every 6 hours  methimazole 15 milliGRAM(s) Oral daily  metoclopramide Injectable 10 milliGRAM(s) IV Push every 8 hours  metoprolol tartrate 12.5 milliGRAM(s) Oral every 12 hours  mirtazapine 7.5 milliGRAM(s) Oral at bedtime  multivitamin 1 Tablet(s) Oral daily  ondansetron Injectable 8 milliGRAM(s) IV Push every 8 hours  pantoprazole  Injectable 40 milliGRAM(s) IV Push every 12 hours  predniSONE   Tablet 3 milliGRAM(s) Oral daily  sertraline 100 milliGRAM(s) Oral daily  sodium chloride 0.9%. 1000 milliLiter(s) (10 mL/Hr) IV Continuous <Continuous>  thiamine 100 milliGRAM(s) Oral daily  urea Oral Powder 15 Gram(s) Oral daily  vancomycin    Solution 125 milliGRAM(s) Oral every 12 hours    MEDICATIONS  (PRN):  fentaNYL    Injectable 12 MICROGram(s) IV Push every 3 hours PRN Moderate to severe pain  guaiFENesin Oral Liquid (Sugar-Free) 200 milliGRAM(s) Oral every 4 hours PRN mucous plugging  simethicone 80 milliGRAM(s) Chew every 8 hours PRN Gas      social history: see consult     family history: see consult   ROS:   ENT: all negative except as noted in HPI   Pulm: denies SOB, cough, hemoptysis  Neuro: denies numbness/tingling, loss of sensation  Endo: denies heat/cold intolerance, excessive sweating      Vital Signs Last 24 Hrs  T(C): 36.7 (29 Oct 2021 08:00), Max: 37.3 (29 Oct 2021 00:00)  T(F): 98 (29 Oct 2021 08:00), Max: 99.1 (29 Oct 2021 00:00)  HR: 101 (29 Oct 2021 11:00) (94 - 124)  BP: --  BP(mean): 80 (29 Oct 2021 08:00) (72 - 84)  RR: 25 (29 Oct 2021 11:00) (17 - 40)  SpO2: 99% (29 Oct 2021 11:00) (87% - 100%)                          9.7    21.66 )-----------( 292      ( 29 Oct 2021 00:20 )             29.6    10-29    130<L>  |  94<L>  |  12  ----------------------------<  136<H>  4.0   |  23  |  0.47<L>    Ca    9.5      29 Oct 2021 00:20  Phos  2.6     10-29  Mg     1.5     10-29    TPro  7.6  /  Alb  3.9  /  TBili  0.7  /  DBili  x   /  AST  74<H>  /  ALT  82<H>  /  AlkPhos  213<H>  10-29       PHYSICAL EXAM:  Gen: NAD  Skin: No rashes, bruises, or lesions  Head: Normocephalic, Atraumatic  Face: no edema, erythema, or fluctuance. Parotid glands soft without mass  Eyes: no scleral injection  Nose: Nares bilaterally patent, no discharge  Mouth: No Stridor / Drooling / Trismus.  Mucosa moist, tongue/uvula midline, oropharynx clear  Neck: #6 DCT in place with speaking valve. Flat, supple, no lymphadenopathy, trachea midline, no masses  Lymphatic: No lymphadenopathy  Resp: on trach collar   Neuro: facial nerve intact, no facial droop    indirect laryngoscopy- Reason for Laryngoscopy:    Patient was unable to cooperate with mirror.  Nasopharynx, oropharynx, and hypopharynx clear, no bleeding. Tongue base, posterior pharyngeal wall, vallecula, epiglottis, and subglottis appear normal. No erythema, edema, pooling of secretions, masses or lesions. Airway patent, no foreign body visualized. No glottic/supraglottic edema. True vocal cords, arytenoids, vestibular folds, ventricles, pyriform sinuses, and aryepiglottic folds appear normal bilaterally. Vocal cords mobile with good contact b/l.

## 2021-10-29 NOTE — PROGRESS NOTE ADULT - ATTENDING COMMENTS
Hyponatremia: chronic, intermittent, asymptomatic. Does not appear volume overloaded  labs consistent with true hyponatremia, 2/2 SIADH in the setting of use SSRI and pain. Pt. also on IV D5w and was receiving IV bactrim in D5w.   Pt. now off of IV bactrim (was receiving it in D5w)  sodium level improved to 130 today  Hold off on addition of UreNa at this time.   Encourage PO solute intake.   monitor urine osm daily (UreNa likley to have a greater effect on patients with urine osm <450 and with low weight)  Avoid hypotonic fluids.       Melissa Chin MD  Cell : [phone]  Pager : [phone]  Office : [phone]

## 2021-10-29 NOTE — PROGRESS NOTE ADULT - SUBJECTIVE AND OBJECTIVE BOX
[facility] DIVISION OF KIDNEY DISEASES AND HYPERTENSION -- FOLLOW UP NOTE  --------------------------------------------------------------------------------  Chief Complaint: hyponatremia    24 hour events/subjective:    seen and examined this morning   reported some mild abdominal pain but denied any nausea or vomiting  no acute events noted overnight     PAST HISTORY  --------------------------------------------------------------------------------  No significant changes to PMH, PSH, FHx, SHx, unless otherwise noted    ALLERGIES & MEDICATIONS  --------------------------------------------------------------------------------  Allergies    Amiodarone Hydrochloride (Other (Severe))    Intolerances      Standing Inpatient Medications  chlorhexidine 2% Cloths 1 Application(s) Topical <User Schedule>  ciprofloxacin     Tablet 500 milliGRAM(s) Oral every 12 hours  insulin lispro (ADMELOG) corrective regimen sliding scale   SubCutaneous every 6 hours  methimazole 15 milliGRAM(s) Oral daily  metoclopramide Injectable 10 milliGRAM(s) IV Push every 8 hours  metoprolol tartrate 12.5 milliGRAM(s) Oral every 12 hours  mirtazapine 7.5 milliGRAM(s) Oral at bedtime  multivitamin 1 Tablet(s) Oral daily  ondansetron Injectable 8 milliGRAM(s) IV Push every 8 hours  pantoprazole  Injectable 40 milliGRAM(s) IV Push every 12 hours  predniSONE   Tablet 3 milliGRAM(s) Oral daily  sertraline 100 milliGRAM(s) Oral daily  sodium chloride 0.9%. 1000 milliLiter(s) IV Continuous <Continuous>  thiamine 100 milliGRAM(s) Oral daily  urea Oral Powder 15 Gram(s) Oral daily  vancomycin    Solution 125 milliGRAM(s) Oral every 12 hours    PRN Inpatient Medications  fentaNYL    Injectable 12 MICROGram(s) IV Push every 3 hours PRN  guaiFENesin Oral Liquid (Sugar-Free) 200 milliGRAM(s) Oral every 4 hours PRN  simethicone 80 milliGRAM(s) Chew every 8 hours PRN      REVIEW OF SYSTEMS      All other systems were reviewed and are negative, except as noted.    VITALS/PHYSICAL EXAM  --------------------------------------------------------------------------------  T(C): 37.3 (10-29-21 @ 00:00), Max: 37.3 (10-29-21 @ 00:00)  HR: 111 (10-29-21 @ 07:00) (96 - 124)  BP: --  RR: 33 (10-29-21 @ 07:00) (17 - 40)  SpO2: 87% (10-29-21 @ 07:00) (87% - 100%)  Wt(kg): --        10-28-21 @ 07:01  -  10-29-21 @ 07:00  --------------------------------------------------------  IN: 1151 mL / OUT: 575 mL / NET: 576 mL        Physical Exam:  	Gen: NAD, cachectic   	HEENT: MMM, trach   	Pulm: CTA B/L  	CV: S1S2  	Abd: Soft, +BS, dawn tube    	Ext: No LE edema B/L  	Neuro: Awake  	Skin: Warm and dry  	Vascular access: N/A      LABS/STUDIES  --------------------------------------------------------------------------------              9.7    21.66 >-----------<  292      [10-29-21 @ 00:20]              29.6     130  |  94  |  12  ----------------------------<  136      [10-29-21 @ 00:20]  4.0   |  23  |  0.47        Ca     9.5     [10-29-21 @ 00:20]      Mg     1.5     [10-29-21 @ 00:20]      Phos  2.6     [10-29-21 @ 00:20]    TPro  7.6  /  Alb  3.9  /  TBili  0.7  /  DBili  x   /  AST  74  /  ALT  82  /  AlkPhos  213  [10-29-21 @ 00:20]              [10-29-21 @ 00:20]    Creatinine Trend:  SCr 0.47 [10-29 @ 00:20]  SCr 0.66 [10-28 @ 00:38]  SCr 0.51 [10-27 @ 00:24]  SCr 0.55 [10-26 @ 01:18]  SCr 0.50 [10-25 @ 16:59]      Urine Sodium 70      [10-25-21 @ 16:36]  Urine Osmolality 210      [10-25-21 @ 16:36]    Iron 27, TIBC 202, %sat 13      [08-15-21 @ 10:20]  Ferritin 498      [08-15-21 @ 10:20]  Vitamin D (25OH) 11.0      [01-12-21 @ 14:15]  HbA1c 6.2      [09-01-17 @ 00:08]  TSH <0.01      [09-25-21 @ 04:14]  Lipid: chol 153, , HDL 34, LDL --      [08-15-21 @ 13:53]

## 2021-10-29 NOTE — SWALLOW FEES ASSESSMENT ADULT - ORAL PHASE
Up to max uncontrolled spillover to the pyriform sinuses Up to Max uncontrolled spillover to the pyriform sinuses, with suspected intermittent spillover over the aryepiglottic folds, with no laryngeal residue post swallow Passive spillover to the pyriform sinuses Max uncontrolled spillover to the pyriforms prior to swallow trigger

## 2021-10-29 NOTE — PROGRESS NOTE ADULT - SUBJECTIVE AND OBJECTIVE BOX
RICKY JOINT  MRN#: [id number]  Subjective:  Pulmonary progress  : recurrent Acute hypoxic respiratory Failure ,aspiration pneumonia, NICM  ,   65M PMH ACC/AHA stage D HF due to NICM HM2 LVAD , TV annuloplasty ring 17 as destination therapy due to severe peripheral artery disease with significant stenosis  SIADH, Depression, CKD-3 with hyperkalemia, past E. coli UTIs, driveline drainage (21) and COVID-19 (back in 2020)  He was recently seen in clinic where he complained of abdominal pain and dark stools w constipation back in May. He presents to [facility] ER today weakness and fatigue, moderate and + Black stools for three days, on coumadin secondary to warfarin use in the setting of an LVAD. Patient has required transfusions for GIB in the past. Mostly recently back in 2021 pt had anemia with dark stools. No interventions was done at that time. However Last Endoscopy was done in 2020 (negative). Today labs show patient is anemic with H/H of 4.5/16.3,. INR is 8.84 MAP in the 90s, Temp 35.1. He denies any chest pain, shortness of breath, dizziness, abd pain, nausea or vomiting. found to have  rectal bleeding underwent endoscopy ,old blood in the proximal ileum ,  develop sepsis with LL opacity given Antibiotics , Extubated , reintubated , Bronchoscopy on Zosyn for LL pneumonia  and Amiodarone S/P TV Annuloplasty , patient remain intubated on full ventilatory support .S/P multiple units of blood transfusion , remain on full ventilatory support on Precedex and propofol , new central IJ line , diarrhea C diff. +ve on po vancomycin and IV Flagyl,  mildly distended belly , fever start on cefepime 2gm q 8 hrs S/P tracheostomy .  new RT Subclavian central line continue on contact  isolation ,S/P  C-diff antibiotics, no more diarrhea back on full support mechanical ventilator , chest x ray show improvement in LLL air space disease, more awake and responsive on tube feeding no more diarrhea ,  no nausea or vomiting or diarrhea still very weak and tired , back on tube feeding ,still on po vancomycin , getting PT and OT at bed side ,   , no SOB getting stronger , improve muscle tone patient transfer to monitor bed still on contact isolation for C-Difficel colitis on 50% FI02, and change to Suresh  tube feeding still loose stool . H/H drop significantly require blood transfusion , most likely GI bleeding , IV heparin D/C ,  H/H is stable ., patient develop TR sided  pneumothorax require chest tube placement , RT IJ central line  placed , develop fever shaking chills , blood culture positive for serratia marcescens , start on cefepime .the patient  become hypoxic and hypotensive placed on full ventilatory support and Vasopressin , levophed and Dobutamine ,S/P blood transfusion on meropenem and vancomycin ,   , on and  off pressors , occasional agitation on Precedex .S/P IR cholecystostomy tube drainage placement in the RT upper Quadrant , resume anticoagulation chest x ray noted C-PAP trail lasted only for 2 hrs , new RT SC line and D/C RT IJ line , RT pig tail cathter has been removed , tolerating C-PAP trial placed on trach. collar 50% FI02 GI consultant noted on NG tube feeding as tolerated , develop AF RVR S/P  bolus Amiodarone  back to regular sinus Rhythm , Flat Affect depressed , back on tube feeding Vital AF at 60 cc/ hr .still intermittent abdominal pain , no fever saturation is accepted  back on full ventilatory support ,   on methimazole for hyperthyroidism ,  condition is the same ,still C/O Abdominal pain , white count is improving , no chest pain or SOB ,  .placed on Reglan 10 mg TID for gastric motility , depressed , withdrawn. , S/P  mesenteric angiogram , unable to stent SMA S/P 3 units of blood transfusion   RT IJ in place reassessed for stent in the SMA by vascular,  dark stool stable H/H ,surgical opinion for possible Lap cholecystectomy. over night events noted develop respiratory distress, , rectal bleeding, require intubation placed on full ventilatory support , FI02 is 50% also became hemianosmically unstable require triple pressor support with levophed , vasopressin and norsynephrine, pan culture placed  on Vancomycin IV and PO  and Zosyn, sedated with propofol kept NPO , new central line RT and left IJ cathter placed . Diflucan Added .fever of 38.5 weaned off  vasopressin ,   fever adding IV vancomycin and  vancomycin solution  , and Bactrim , S/P  tracheostomy , bleeding receiving blood transfusion on vasopressin , no more bleeding , no fever , more awake and responsive ,tolerating tube feeding , ID and heart failure follow up appreciated , depresses no weaning for now , magnesium is low to give supplement too keep Above 2 , patient S/P  vascular procedure for superior mesenteric artery occlusion S/P 2 stent placement , patient tolerate it very well  , left upper extremities hematoma .vascular follow up appreciated , increase WBC , new RT UPE midline , black tarry stool , very loose R/O recurrent GI bleeding and C-dificile  colitis stable H/H no events over night , diarrhea is improved ,  WBC is improving  , hyponatremia no change , S/P FEEST study result is pending      (2021 16:57)    PAST MEDICAL & SURGICAL HISTORY:  CHF (congestive heart failure)    CAD (coronary artery disease)  Depression    Pleural effusion    History of 2019 novel coronavirus disease (COVID-19)  2020    Hemorrhoids    Bleeding hemorrhoids    Peripheral arterial disease    Claudication    BPH with urinary obstruction    ACC/AHA stage D systolic heart failure  Anticoagulation goal of INR 2.0 to 2.5    Falls    Clavicle fracture    CKD (chronic kidney disease), stage III    Iron deficiency anemia    H/O epistaxis    Vertigo    GI bleed    S/P TVR (tricuspid valve repair)    S/P ventricular assist device    S/P endoscopy    OBJECTIVE:  ICU Vital Signs Last 24 Hrs  T(C): 38.2 (2021 10:00), Max: 38.5 (2021 12:00)  T(F): 100.8 (2021 10:00), Max: 101.3 (2021 12:00)  HR: 65 (2021 10:00) (61 - 69)  BP: --  BP(mean): --  ABP: 105/67 (2021 10:00) (90/54 - 113/64)  ABP(mean): 77 (2021 10:00) (63 - 77)  RR: 20 (2021 10:00) (19 - 35)  SpO2: 99% (2021 10:00) (96% - 100%)       @ 07:01  -   @ 07:00  --------------------------------------------------------  IN: 2693.9 mL / OUT: 1415 mL / NET: 1278.9 mL     @ 07: @ 10:49  --------------------------------------------------------  IN: 420.8 mL / OUT: 115 mL / NET: 305.8 mL  PHYSICAL EXAM: Daily   Elderly male  on trach. collar  FI02 is 40% S/P tracheostomy   Daily Weight in k.4 (2021 00:00)  HEENT:     + NCAT  + EOMI  - Conjuctival edema   - Icterus   - Thrush   - ETT  + NGT/OGT  Neck:         + FROM  + JVD  - Nodes - Masses + Mid-line trachea + Tracheostomy  Chest:            normal A-P diameter    Lungs:          + CTA   + Rhonchi    - Rales    - Wheezing + Decreased  LT BS   - Dullness R L  Cardiac:       + S1 + S2    + RRR   - Irregular   - S3  - S4    - Murmurs   - Rub   - Hamman’s sign   Abdomen:    + BS  + Soft + Non-tender - Distended - Organomegaly - PEG .cholecystostomy tube in place  Extremities:   - Cyanosis U/L   - Clubbing  U/L  + LE/UE Edema LUE hematoma   + Capillary refill    + Pulses   Neuro:        - Awake   -  Alert   - Confused   - Lethargic   + Sedated  + Generalized Weakness  Skin:        - Rashes    - Erythema   + Normal incisions   + IV sites intact          HOSPITAL MEDICATIONS: All medications reviewed and analyzed  MEDICATIONS  (STANDING):  amiodarone    Tablet 200 milliGRAM(s) Oral daily  chlorhexidine 0.12% Liquid 15 milliLiter(s) Oral Mucosa every 12 hours  chlorhexidine 2% Cloths 1 Application(s) Topical <User Schedule>  dexmedetomidine Infusion 0.5 MICROgram(s)/kG/Hr (9.81 mL/Hr) IV Continuous <Continuous>  dextrose 50% Injectable 50 milliLiter(s) IV Push every 15 minutes  heparin  Infusion 400 Unit(s)/Hr (12.5 mL/Hr) IV Continuous <Continuous>  Hydromorphone  Injectable 0.5 milliGRAM(s) IV Push once  insulin lispro (ADMELOG) corrective regimen sliding scale   SubCutaneous every 6 hours  pantoprazole  Injectable 40 milliGRAM(s) IV Push every 12 hours  piperacillin/tazobactam IVPB.. 3.375 Gram(s) IV Intermittent every 8 hours  propofol Infusion 20 MICROgram(s)/kG/Min (9.42 mL/Hr) IV Continuous <Continuous>  sodium chloride 0.9% lock flush 3 milliLiter(s) IV Push every 8 hours  sodium chloride 0.9%. 1000 milliLiter(s) (10 mL/Hr) IV Continuous <Continuous>    MEDICATIONS  (PRN):  acetaminophen    Suspension .. 650 milliGRAM(s) Oral every 6 hours PRN Temp greater or equal to 38C (100.4F)    LABS: All Lab data reviewed and analyzed                         8.7    14.81 )-----------( 232      ( 28 Oct 2021 00:38 )  10-28    128<L>  |  94<L>  |  12  ----------------------------<  154<H>  4.0   |  20<L>  |  0.66    Ca    9.6      28 Oct 2021 00:38  Phos  2.8     10-28  Mg     1.8     10-28    TPro  7.2  /  Alb  3.5  /  TBili  0.6  /  DBili  x   /  AST  39  /  ALT  39  /  AlkPhos  192<H>  10-28               26.6   10-27    Ca    9.8      27 Oct 2021 00:24  Phos  2.4     10-  Mg     1.6     10-27                                                                                                                                                                                      PTT - ( 2021 04:52 )  PTT:45.2 sec LIVER FUNCTIONS - ( 2021 00:42 )  Alb: 3.4 g/dL / Pro: 6.7 g/dL / ALK PHOS: 213 U/L / ALT: 15 U/L / AST: 24 U/L / GGT: x           RADIOLOGY: - Reviewed and analyzed  , LVAD HM2, CT scan of abdomen reviewed result noted

## 2021-10-29 NOTE — PROGRESS NOTE ADULT - ASSESSMENT
Patient is a 66 year old male with PMH of stage D NICM s/p HM2 on 9/2017 as DT (due to severe PAD) with TV ring, prior COVID-19 infection 4/2020, recurrent syncope post LVAD s/p ILR, and chronic abdominal pain with prior negative workup who was admitted 6/14/21 with symptomatic anemia with Hgb 4.5 in setting of INR 8.8 without hemodynamic instability and has since had a prolonged hospital course. The nephrology team was consulted for hyponatremia over the past one week.     Hyponatremia:   - patient admitted 6/14 and has had a prolonged hospital course since with episodes of bacteremia   - hyponatremia with a sodium of 130 this AM; improved this morning   - suspect this is the setting of excess free water with the patient on IVF with D5 as well as receiving bactrim in D5 solution as well, also is on sertraline that can contribute to hyponatremia  - continue with UreNa 15g daily   - pending repeat Ur osm Patient is a 66 year old male with PMH of stage D NICM s/p HM2 on 9/2017 as DT (due to severe PAD) with TV ring, prior COVID-19 infection 4/2020, recurrent syncope post LVAD s/p ILR, and chronic abdominal pain with prior negative workup who was admitted 6/14/21 with symptomatic anemia with Hgb 4.5 in setting of INR 8.8 without hemodynamic instability and has since had a prolonged hospital course. The nephrology team was consulted for hyponatremia over the past one week.     Hyponatremia:   - patient admitted 6/14 and has had a prolonged hospital course since with episodes of bacteremia   - hyponatremia with a sodium of 130 this AM; improved this morning   - suspect this is the setting of excess free water with the patient on IVF with D5 as well as receiving bactrim in D5 solution as well, also is on sertraline that can contribute to hyponatremia  - sodium levels improved without UreNa; will hold at this time   - now that patient is able to tolerate oral intake then we can expect that his sodium levels will improve  - please repeat Ur osm    If any questions, please feel free to contact me     Gary Webb  Nephrology Fellow  [facility] Pager: [phone]

## 2021-10-30 NOTE — PROGRESS NOTE ADULT - ASSESSMENT
Assessment and Recommendation:   · Assessment	  Assessment and recommendation :  Recurrent Acute hypoxic respiratory Failure  FI02 is 40% S/P tracheostomy  on track. collar   Acute blood loss anemia S/P multiple  blood transfusion post tracheostomy   recurrent  septic shock  weaned off  vasopressin   sepsis   on Cipro tablets   po vancomycin   C-Dificle colitis on po vancomycin   S/P cholecystostomy tube placement by IR    patient is S/P  repeat vacular procedure and SMA stent x2 complicated with LUE hematoma   AF RVR back to regular sinus Rhythm   Non ischemic cardiomyopathy continue ACE inhibitor and B-Blockers   hyponatremia fluid restriction   S/P 3 unit of blood transfusion   Stage D systolic heart failure S/P LVAD HM2   MH2 LVAD  with  TV Annuloplasty  Severe peripheral vascular disease   severe hyperglycemia on insulin coverage    Reglan 10 mg for Gastric Motility   hyperthyroidism on Methimazole 10mg TID   critical care polyneuropathy   Anemia of Acute blood Loss   severe protein caloric malnutrition   magnesium supplement keep above 2   Chronic kidney disease stage III  Depressed and withdrawn   on  NG tube feeding   GI prophylaxis with PPI   Discussed with TCV team

## 2021-10-30 NOTE — PROGRESS NOTE ADULT - SUBJECTIVE AND OBJECTIVE BOX
RICKY HAMPTON  MRN-[id number]  Patient is a 66y old  Male who presents with a chief complaint of Anemia, Supratherapeutic INR, Dark Stools (29 Oct 2021 20:56)    HPI:  65M PMH ACC/AHA stage D HF due to NICM HM2 LVAD , TV annuloplasty ring 17 as destination therapy due to severe peripheral artery disease with significant stenosis  SIADH, Depression, CKD-3 with hyperkalemia, past E. coli UTIs, driveline drainage (21) and COVID-19 (back in 2020)  He was recently seen in clinic where he complained of abdominal pain and dark stools w constipation back in May. He presents to [facility] ER today weakness and fatigue, moderate and + Black stools for three days, on coumadin secondary to warfarin use in the setting of an LVAD. Patient has required transfusions for GIB in the past. Mostly recently back in 2021 pt had anemia with dark stools. No interventions was done at that time. However Last Endoscopy was done in 2020 (negative). Today labs show patient is anemic with H/H of 4.5/16.3,. INR is 8.84 MAP in the 90s, Temp 35.1. He denies any chest pain, shortness of breath, dizziness, abd pain, nausea or vomiting.       (2021 16:57)      Surgery/Hospital Course:   admit for melena w/ anemia, INR 8.84   6/15 Capsul study (+) for small bowel bleed, balloon endoscopy (old blood in prox ileum); post EGD - septic w/ L opacity, re-intubated for concern for aspiration, TTE (Mod MR, decrease biV w/ interventricular septum boweing towards R)   bronch    +C Diff    CT C/A/P: Fluid filled colon which may be 2/2 rapid transit. Small bilateral pleffs with associates. Compressive atelectasis New ISABELLE & LLL  parenchymal opacities, suspicious for pneumonia. Moderate stenosis in the proximal superior mesenteric artery.    #8 Shiley trach at bedside    LVAD speed increased to 9200   Bronch   TC since . Patient transferred to SDU.    INR today 2.64.  H&H 7.3/24 this AM.  Will repeat CBC at noon, and will send stool guaiac Patient with persistent abdominal tenderness, rate of tube feeds decreased.  No nausea/vomiting.     INR today 2.4. H&H 9.1/28.6 low flow overnight /N&V, refusing Tube feeds on D5 1/2 normal  @50 cc/hr   INR 2.69  H&H 7.7/.1 refusing Tube feeds on D5 1/2 normal  @50 cc/hr. This am + BM Melena Dr Oneill HF  aware- PRBC x1  GI team consulted -  NPO  plan on study in am-  D/w Dr Cadet Patient  to return to CTU for further management; 1PRBCS    Post op INR 2.2 today.  No bleeding. BC + for SM.  Pt is hypotensive requiring pressor and inotropic support.  ID follow up today on Cefepime will follow.   R PTC for PTX    CT C/A/P: sub q emphysema in R chest wall, GGO RUL, small ascites CTH negative; Abd US: GB thickening, pericholecystic fluid     Perchole drain in place continues to drain total output overnight 133.  Fever today 38.8    duplex LE negative    Patient with persistent abdominal pain, refusing tube feeds and medications, Psych consulted   CTA A/P ordered to r/o mesenteric ischemia 2/2 persistent anorexia, nausea, vomiting. Revealed:  Evaluation of the mesenteric vessels is limited by streak artifact from LVAD. There appears to be severe stenosis of the proximal SMA; abdominal mesenteric doppler is recommended for further evaluation. 2.  No small bowel findings to suggest acute mesenteric ischemia. 3.  Focal dissections involving the right and left common iliac arteries.  8/15: Cultures resulted BC 1/2 +GPC in clusters, SC enterobacter; mesenteric duplex: borderline stenosis of proximal SMA  : CT C/A/P noncon: Nondular opacities in R lung apex w/cavitation, abd nl  :  Continue current care, treatment of thyrotoxicosis with medications as per endocrine, d/c ABX as per team.    RUQ sono: Contracted gallbladder with cholecystostomy tube in place.  9/10 failed SMA stent, c/b RP bleed s/p 3U PRCB   CTA Abd/Pelvis L RP hematoma    Trach decannulated    intubated fro resp distress for increased WOB, LL pneumonia; CT C/A/P: progressive ISABELLE opacities and L consolidations, multifocal pneumonia    TTE (EF 25%, decreased RV, mod MR)   10/3 VT -> Lidocaine gtt   10/4 Trach size 6 DCT cuff  10/5 CT abd (neg for intra-abd abcess, severe stenosis of prox SMA and b/l iliac arteries)  10/18 SMA Stent   10/23 Emend for nausea   10/24 +Occult    Today:    REVIEW OF SYSTEMS:  Speak over trach   Gen: No fever  EYES/ENT: No visual changes;  No vertigo or throat pain   NECK: No pain   RES:  No shortness of breath or Cough  CARD: No chest pain   GI: +intermittently c/o abd pain and nausea   : No dysuria  NEURO: No weakness  SKIN: No itching, rashes     ICU Vital Signs Last 24 Hrs  T(C): 36.5 (30 Oct 2021 04:00), Max: 36.7 (29 Oct 2021 08:00)  T(F): 97.7 (30 Oct 2021 04:00), Max: 98 (29 Oct 2021 08:00)  HR: 103 (30 Oct 2021 05:00) (85 - 119)  BP: --  BP(mean): 80 (30 Oct 2021 04:00) (80 - 85)  ABP: --  ABP(mean): --  RR: 18 (30 Oct 2021 05:00) (16 - 49)  SpO2: 98% (30 Oct 2021 05:00) (87% - 100%)      Physical Exam:  Gen:  NAD  CNS: intermittently agitated and confused    Neck: no JVD, +trach, +central line   RES : coarse breath sounds, no wheezing    CVS: +LVAD hum   Abd: Soft. Sybil drain with bilious fluid. Positive BS throughout. Mild RUQ abdominal tenderness by perc sybil.  Skin: No rash, erythema, cyanosis.  Vasc: Warm and well-perfused.  Ext:  no edema    ============================I/O===========================   I&O's Detail    28 Oct 2021 07:01  -  29 Oct 2021 07:00  --------------------------------------------------------  IN:    Dexmedetomidine: 6 mL    Enteral Tube Flush: 150 mL    IV PiggyBack: 100 mL    Miscellaneous Tube Feedin mL  Total IN: 1326 mL    OUT:    Drain (mL): 250 mL    sodium chloride 0.9%: 0 mL    Voided (mL): 445 mL  Total OUT: 695 mL    Total NET: 631 mL      29 Oct 2021 07:01  -  30 Oct 2021 06:58  --------------------------------------------------------  IN:    Enteral Tube Flush: 150 mL    Miscellaneous Tube Feedin mL    sodium chloride 0.9%: 115 mL  Total IN: 1415 mL    OUT:    Drain (mL): 100 mL    Voided (mL): 525 mL  Total OUT: 625 mL    Total NET: 790 mL        ============================ LABS =========================                        9.3    12.69 )-----------( 272      ( 30 Oct 2021 00:48 )             29.3     10-30    129<L>  |  95<L>  |  11  ----------------------------<  110<H>  3.9   |  24  |  0.44<L>    Ca    9.3      30 Oct 2021 00:48  Phos  2.5     10-30  Mg     1.7     10-30    TPro  7.8  /  Alb  3.5  /  TBili  0.6  /  DBili  x   /  AST  87<H>  /  ALT  118<H>  /  AlkPhos  223<H>  10-30    LIVER FUNCTIONS - ( 30 Oct 2021 00:48 )  Alb: 3.5 g/dL / Pro: 7.8 g/dL / ALK PHOS: 223 U/L / ALT: 118 U/L / AST: 87 U/L / GGT: x                 ======================Micro/Rad/Cardio=================  Culture: Reviewed   CXR: Reviewed  Echo:Reviewed  ======================================================  PAST MEDICAL & SURGICAL HISTORY:  CHF (congestive heart failure)    CAD (coronary artery disease)    Depression    Pleural effusion    History of 2019 novel coronavirus disease (COVID-19)  2020    Hemorrhoids    Bleeding hemorrhoids    Peripheral arterial disease    Claudication    BPH with urinary obstruction    ACC/AHA stage D systolic heart failure    Anticoagulation goal of INR 2.0 to 2.5    Falls    Clavicle fracture    CKD (chronic kidney disease), stage III    Iron deficiency anemia    H/O epistaxis    Vertigo    GI bleed    S/P TVR (tricuspid valve repair)    S/P ventricular assist device    S/P endoscopy      ====================ASSESSMENT ==============  Stage D Nonischemic Cardiomyopathy, Status Post HM2 on 2017    Cardiogenic shock  Hemodynamic instability   Acute hypoxemic respiratory failure s/p trach , decannulated on ; Reintubated on    GI bleed , Status Post Enteroscopy   Anemia, in setting of melena   Chronic Kidney Disease  Stress hyperglycemia   C.diff positive on    Hypovolemic shock  Septic shock  Leukocytosis  GB thickening/percholecystic s/p perc sybil by IR   SMA stenosis s/p SMA Stent on 10/18   Serratia/citrobacter pneumonia   Stenotrophomonas pneumonia   Enterobacter pneumonia   Nasuea/vomiting  Deconditioning  Hyponatremia  DNR / No vent    Plan:  ====================== NEUROLOGY=====================  Intermittently agitated and confused, on 1:1   Continue close monitoring of neuro status   C/w sertraline, mirtazapine for depression   PRN Fentanyl for pain     fentaNYL    Injectable 12 MICROGram(s) IV Push every 3 hours PRN Moderate to severe pain  mirtazapine 7.5 milliGRAM(s) Oral at bedtime  sertraline 100 milliGRAM(s) Oral daily    ==================== RESPIRATORY======================  Acute hypoxemic respiratory failure s/p #8 shiley trach on ; decannulated on ; Reintubated on ; trach size 6 DCT cuff on 10/4   Continue close monitoring of respiratory rate and breathing pattern, following of ABG’s with [place] monitoring, continuous pulse oximetry monitoring.   Tolerating TC since 10/19, continue TC as tolerated   Guaifenesin PO PRN mucous plugging     DNR/No vent     guaiFENesin Oral Liquid (Sugar-Free) 200 milliGRAM(s) Oral every 4 hours PRN mucous plugging    ====================CARDIOVASCULAR==================  Stage D Nonischemic Cardiomyopathy, Status Post HM2 on 2017; LVAD settings 9200, flow 5.0  TTE 10/4:  Severely decreased LV systolic function, Diffuse hypokinesis. Mild AR. No pericardial effusion   VT resolved, continue rate control w/ Lopressor   ASA/Plavix for recent SMA stent on hold due to drop in h/h and +occult feces on 10/24    metoprolol tartrate 12.5 milliGRAM(s) Oral every 12 hours    ===================HEMATOLOGIC/ONC ===================  CTA A/P on  +L RP hematoma;   Acute blood loss anemia, monitor H&H/Plts   LUE US on 10/20 with no pseudoaneurysm  ASA/Plavix for recent SMA stent on hold for recent drop in h/h in setting of (+) guaiac (on 10/24)     ===================== RENAL =========================  Hx of CKD, continue monitoring urine output, I&OS, BUN/Cr   Replete lytes PRN. Keep K> 4 and Mg >2  Hyponatremia, NA rising 1280->129    ==================== GASTROINTESTINAL===================  CT A/P 10/5 neg for intra-abd abcess, severe stenosis of prox SMA and b/l iliac arteries  CTA A/P : Evaluation of the mesenteric vessels is limited by streak artifact from LVAD. There appears to be severe stenosis of the proximal SMA; abdominal mesenteric doppler is recommended for further evaluation. 2.  No small bowel findings to suggest acute mesenteric ischemia. 3.  Focal dissections involving the right and left common iliac arteries.  Stenosis of proximal SMA, s/p failed SMA stent c/b RP bleed on 9/10 - SMA stent with Vascular 10/18   Simethicone PRN for gas   Reglan for gut motility  Zofran for nausea   +occult feces on 10/24   Plan to repeat RUQ U/S to assess GB with perc sybil drain intact to ensure it is still decompressed in light of worsening leukocytosis  Tolerating tube feeds, Vital @ 50cc/hr, with goal of 60     urea Oral Powder 15 Gram(s) Oral daily  multivitamin 1 Tablet(s) Oral daily  GI prophylaxis, pantoprazole  Injectable 40 milliGRAM(s) IV Push every 12 hours  simethicone 80 milliGRAM(s) Chew every 8 hours PRN Gas  ondansetron Injectable 8 milliGRAM(s) IV Push every 8 hours  sodium chloride 0.9%. 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   BCx 10/14 and SCX on 10/14 +Stenotrophomonas maltophilia s/p Bactrim   Empiric coverage with Ciprofloxacin and Vancomycin    ciprofloxacin     Tablet 500 milliGRAM(s) Oral every 12 hours  vancomycin    Solution 125 milliGRAM(s) Oral every 12 hours      Patient requires continuous monitoring with bedside rhythm monitoring, pulse ox monitoring, and intermittent blood gas analysis. Care plan discussed with ICU care team. Patient remained critical and at risk for life threatening decompensation.     By signing my name below, I, Aparna Saleh, attest that this documentation has been prepared under the direction and in the presence of CLAUDIA Mejia   Electronically signed: Cesia Villegas, 10-30-21 @ 06:58    I, Go Sellers, personally performed the services described in this documentation. all medical record entries made by the scribe were at my direction and in my presence. I have reviewed the chart and agree that the record reflects my personal performance and is accurate and complete  Electronically signed: CLAUDIA Mejia        RICKY HAMPTON  MRN-[id number]  Patient is a 66y old  Male who presents with a chief complaint of Anemia, Supratherapeutic INR, Dark Stools (29 Oct 2021 20:56)    HPI:  65M PMH ACC/AHA stage D HF due to NICM HM2 LVAD , TV annuloplasty ring 17 as destination therapy due to severe peripheral artery disease with significant stenosis  SIADH, Depression, CKD-3 with hyperkalemia, past E. coli UTIs, driveline drainage (21) and COVID-19 (back in 2020)  He was recently seen in clinic where he complained of abdominal pain and dark stools w constipation back in May. He presents to [facility] ER today weakness and fatigue, moderate and + Black stools for three days, on coumadin secondary to warfarin use in the setting of an LVAD. Patient has required transfusions for GIB in the past. Mostly recently back in 2021 pt had anemia with dark stools. No interventions was done at that time. However Last Endoscopy was done in 2020 (negative). Today labs show patient is anemic with H/H of 4.5/16.3,. INR is 8.84 MAP in the 90s, Temp 35.1. He denies any chest pain, shortness of breath, dizziness, abd pain, nausea or vomiting.     (2021 16:57)    Surgery/Hospital Course:   admit for melena w/ anemia, INR 8.84   6/15 Capsul study (+) for small bowel bleed, balloon endoscopy (old blood in prox ileum); post EGD - septic w/ L opacity, re-intubated for concern for aspiration, TTE (Mod MR, decrease biV w/ interventricular septum boweing towards R)   bronch    +C Diff    CT C/A/P: Fluid filled colon which may be 2/2 rapid transit. Small bilateral pleffs with associates. Compressive atelectasis New ISABELLE & LLL  parenchymal opacities, suspicious for pneumonia. Moderate stenosis in the proximal superior mesenteric artery.    #8 Shiley trach at bedside    LVAD speed increased to 9200   Bronch   TC since . Patient transferred to SDU.    INR today 2.64.  H&H 7.3/24 this AM.  Will repeat CBC at noon, and will send stool guaiac Patient with persistent abdominal tenderness, rate of tube feeds decreased.  No nausea/vomiting.     INR today 2.4. H&H 9.1/28.6 low flow overnight /N&V, refusing Tube feeds on D5 1/2 normal  @50 cc/hr   INR 2.69  H&H 7.7/.1 refusing Tube feeds on D5 1/2 normal  @50 cc/hr. This am + BM Melena Dr Oneill HF  aware- PRBC x1  GI team consulted -  NPO  plan on study in am-  D/w Dr Cadet Patient  to return to CTU for further management; 1PRBCS    Post op INR 2.2 today.  No bleeding. BC + for SM.  Pt is hypotensive requiring pressor and inotropic support.  ID follow up today on Cefepime will follow.   R PTC for PTX    CT C/A/P: sub q emphysema in R chest wall, GGO RUL, small ascites CTH negative; Abd US: GB thickening, pericholecystic fluid     Perchole drain in place continues to drain total output overnight 133.  Fever today 38.8    duplex LE negative    Patient with persistent abdominal pain, refusing tube feeds and medications, Psych consulted   CTA A/P ordered to r/o mesenteric ischemia 2/2 persistent anorexia, nausea, vomiting. Revealed:  Evaluation of the mesenteric vessels is limited by streak artifact from LVAD. There appears to be severe stenosis of the proximal SMA; abdominal mesenteric doppler is recommended for further evaluation. 2.  No small bowel findings to suggest acute mesenteric ischemia. 3.  Focal dissections involving the right and left common iliac arteries.  8/15: Cultures resulted BC 1/2 +GPC in clusters, SC enterobacter; mesenteric duplex: borderline stenosis of proximal SMA  : CT C/A/P noncon: Nondular opacities in R lung apex w/cavitation, abd nl  :  Continue current care, treatment of thyrotoxicosis with medications as per endocrine, d/c ABX as per team.    RUQ sono: Contracted gallbladder with cholecystostomy tube in place.  9/10 failed SMA stent, c/b RP bleed s/p 3U PRCB   CTA Abd/Pelvis L RP hematoma    Trach decannulated    intubated fro resp distress for increased WOB, LL pneumonia; CT C/A/P: progressive ISABELLE opacities and L consolidations, multifocal pneumonia    TTE (EF 25%, decreased RV, mod MR)   10/3 VT -> Lidocaine gtt   10/4 Trach size 6 DCT cuff  10/5 CT abd (neg for intra-abd abcess, severe stenosis of prox SMA and b/l iliac arteries)  10/18 SMA Stent   10/23 Emend for nausea   10/24 +Occult    Today: Follow-up with general surgery regarding RUQ U/S findings of pericholecystic fluid despite perc sybil drain in place as well as ascites    REVIEW OF SYSTEMS:  Speak over trach   Gen: No fever  EYES/ENT: No visual changes;  No vertigo or throat pain   NECK: No pain   RES:  No shortness of breath or Cough  CARD: No chest pain   GI: +intermittently c/o abd pain and nausea   : No dysuria  NEURO: No weakness  SKIN: No itching, rashes     ICU Vital Signs Last 24 Hrs  T(C): 36.5 (30 Oct 2021 04:00), Max: 36.7 (29 Oct 2021 08:00)  T(F): 97.7 (30 Oct 2021 04:00), Max: 98 (29 Oct 2021 08:00)  HR: 103 (30 Oct 2021 05:00) (85 - 119)  BP(mean): 80 (30 Oct 2021 04:00) (80 - 85)  RR: 18 (30 Oct 2021 05:00) (16 - 49)  SpO2: 98% (30 Oct 2021 05:00) (87% - 100%)    Physical Exam:  Gen:  NAD  CNS: intermittently agitated and confused    Neck: no JVD, +trach, +central line   RES : coarse breath sounds, no wheezing    CVS: +LVAD hum   Abd: Soft. Sybil drain with bilious fluid. Positive BS throughout. Mild RUQ abdominal tenderness by perc sybil.  Skin: No rash, erythema, cyanosis.  Vasc: Warm and well-perfused.  Ext:  no edema    ============================I/O===========================   I&O's Detail    28 Oct 2021 07:01  -  29 Oct 2021 07:00  --------------------------------------------------------  IN:    Dexmedetomidine: 6 mL    Enteral Tube Flush: 150 mL    IV PiggyBack: 100 mL    Miscellaneous Tube Feedin mL  Total IN: 1326 mL    OUT:    Drain (mL): 250 mL    sodium chloride 0.9%: 0 mL    Voided (mL): 445 mL  Total OUT: 695 mL    Total NET: 631 mL    29 Oct 2021 07:01  -  30 Oct 2021 06:58  --------------------------------------------------------  IN:    Enteral Tube Flush: 150 mL    Miscellaneous Tube Feedin mL    sodium chloride 0.9%: 115 mL  Total IN: 1415 mL    OUT:    Drain (mL): 100 mL    Voided (mL): 525 mL  Total OUT: 625 mL    Total NET: 790 mL    ============================ LABS =========================                        9.3    12.69 )-----------( 272      ( 30 Oct 2021 00:48 )             29.3     129<L>  |  95<L>  |  11  ----------------------------<  110<H>  3.9   |  24  |  0.44<L>    Ca    9.3      30 Oct 2021 00:48  Phos  2.5     10-30  Mg     1.7     10-30    TPro  7.8  /  Alb  3.5  /  TBili  0.6  /  DBili  x   /  AST  87<H>  /  ALT  118<H>  /  AlkPhos  223<H>  10-30    LIVER FUNCTIONS - ( 30 Oct 2021 00:48 )  Alb: 3.5 g/dL / Pro: 7.8 g/dL / ALK PHOS: 223 U/L / ALT: 118 U/L / AST: 87 U/L / GGT: x           ======================Micro/Rad/Cardio=================  Culture: Reviewed   CXR: Reviewed  Echo:Reviewed    ======================================================  PAST MEDICAL & SURGICAL HISTORY:  CHF (congestive heart failure)    CAD (coronary artery disease)    Depression    Pleural effusion    History of 2019 novel coronavirus disease (COVID-19)  2020    Hemorrhoids    Bleeding hemorrhoids    Peripheral arterial disease    Claudication    BPH with urinary obstruction    ACC/AHA stage D systolic heart failure    Anticoagulation goal of INR 2.0 to 2.5    Falls    Clavicle fracture    CKD (chronic kidney disease), stage III    Iron deficiency anemia    H/O epistaxis    Vertigo    GI bleed    S/P TVR (tricuspid valve repair)    S/P ventricular assist device    S/P endoscopy    ====================ASSESSMENT ==============  Stage D Nonischemic Cardiomyopathy, Status Post HM2 on 2017    Cardiogenic shock  Hemodynamic instability   Acute hypoxemic respiratory failure s/p trach , decannulated on ; Reintubated on    GI bleed , Status Post Enteroscopy   Anemia, in setting of melena   Chronic Kidney Disease  Stress hyperglycemia   C.diff positive on    Hypovolemic shock  Septic shock  Leukocytosis  GB thickening/percholecystic s/p perc sybil by IR   SMA stenosis s/p SMA Stent on 10/18   Serratia/citrobacter pneumonia   Stenotrophomonas pneumonia   Enterobacter pneumonia   Nasuea/vomiting  Deconditioning  Hyponatremia  DNR / No vent    Plan:  ====================== NEUROLOGY=====================  Intermittently agitated and confused, on 1:1   Continue close monitoring of neuro status   C/w sertraline, mirtazapine for depression   PRN Fentanyl for pain     fentaNYL    Injectable 12 MICROGram(s) IV Push every 3 hours PRN Moderate to severe pain  mirtazapine 7.5 milliGRAM(s) Oral at bedtime  sertraline 100 milliGRAM(s) Oral daily    ==================== RESPIRATORY======================  Acute hypoxemic respiratory failure s/p #8 shiley trach on ; decannulated on ; Reintubated on ; trach size 6 DCT cuff on 10/4   Continue close monitoring of respiratory rate and breathing pattern, following of ABG’s with [place] monitoring, continuous pulse oximetry monitoring.   Tolerating TC since 10/19, continue TC as tolerated   Guaifenesin PO PRN mucous plugging     DNR/No vent     guaiFENesin Oral Liquid (Sugar-Free) 200 milliGRAM(s) Oral every 4 hours PRN mucous plugging    ====================CARDIOVASCULAR==================  Stage D Nonischemic Cardiomyopathy, Status Post HM2 on 2017; LVAD settings 9200, flow 5.0  TTE 10/4:  Severely decreased LV systolic function, Diffuse hypokinesis. Mild AR. No pericardial effusion   VT resolved, continue rate control w/ Lopressor   ASA/Plavix for recent SMA stent on hold due to drop in h/h and +occult feces on 10/24    metoprolol tartrate 12.5 milliGRAM(s) Oral every 12 hours    ===================HEMATOLOGIC/ONC ===================  CTA A/P on  +L RP hematoma;   Acute blood loss anemia, monitor H&H/Plts   LUE US on 10/20 with no pseudoaneurysm  ASA/Plavix for recent SMA stent on hold for recent drop in h/h in setting of (+) guaiac (on 10/24)     ===================== RENAL =========================  Hx of CKD, continue monitoring urine output, I&OS, BUN/Cr   Replete lytes PRN. Keep K> 4 and Mg >2  Hyponatremia, NA rising 128->129    ==================== GASTROINTESTINAL===================  CT A/P 10/5 neg for intra-abd abcess, severe stenosis of prox SMA and b/l iliac arteries  CTA A/P : Evaluation of the mesenteric vessels is limited by streak artifact from LVAD. There appears to be severe stenosis of the proximal SMA; abdominal mesenteric doppler is recommended for further evaluation. 2.  No small bowel findings to suggest acute mesenteric ischemia. 3.  Focal dissections involving the right and left common iliac arteries.  Stenosis of proximal SMA, s/p failed SMA stent c/b RP bleed on 9/10 - SMA stent with Vascular 10/18   Simethicone PRN for gas   Reglan for gut motility  Zofran for nausea   +occult feces on 10/24   RUQ U/S findings of pericholecystic fluid despite perc sybil drain in place as well as ascites  Tolerating tube feeds, Vital @ 50cc/hr. Passed FEES yesterday, now able to supplement NGT feedings with PO pureed diet with mildly thickened liquids when sitting upright [facility]    urea Oral Powder 15 Gram(s) Oral daily  multivitamin 1 Tablet(s) Oral daily  GI prophylaxis, pantoprazole  Injectable 40 milliGRAM(s) IV Push every 12 hours  simethicone 80 milliGRAM(s) Chew every 8 hours PRN Gas  ondansetron Injectable 8 milliGRAM(s) IV Push every 8 hours  sodium chloride 0.9%. 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   BCx 10/14 and SCX on 10/14 +Stenotrophomonas maltophilia s/p Bactrim   Empiric coverage with Ciprofloxacin  Will discuss with Gen Sx RUQ U/S findings of pericholecystic fluid despite perc sybil drain in place as well as ascites    ciprofloxacin     Tablet 500 milliGRAM(s) Oral every 12 hours  vancomycin    Solution 125 milliGRAM(s) Oral every 12 hours    I have spent 35 minutes of noncontinuous critical care time with this patient.    Patient requires continuous monitoring with bedside rhythm monitoring, pulse ox monitoring, and intermittent blood gas analysis. Care plan discussed with ICU care team. Patient remained critical and at risk for life threatening decompensation.     By signing my name below, I, Aparna Saleh, attest that this documentation has been prepared under the direction and in the presence of CLAUDIA Mejia   Electronically signed: Romel Villegas, 10-30-21 @ 06:58    I, Go Sellers, personally performed the services described in this documentation. all medical record entries made by the romel were at my direction and in my presence. I have reviewed the chart and agree that the record reflects my personal performance and is accurate and complete  Electronically signed: CLAUDIA Mejia

## 2021-10-30 NOTE — PROGRESS NOTE ADULT - SUBJECTIVE AND OBJECTIVE BOX
RICKY JOINT  MRN#: [id number]  Subjective:  Pulmonary progress  : recurrent Acute hypoxic respiratory Failure ,aspiration pneumonia, NICM  ,   65M PMH ACC/AHA stage D HF due to NICM HM2 LVAD , TV annuloplasty ring 17 as destination therapy due to severe peripheral artery disease with significant stenosis  SIADH, Depression, CKD-3 with hyperkalemia, past E. coli UTIs, driveline drainage (21) and COVID-19 (back in 2020)  He was recently seen in clinic where he complained of abdominal pain and dark stools w constipation back in May. He presents to [facility] ER today weakness and fatigue, moderate and + Black stools for three days, on coumadin secondary to warfarin use in the setting of an LVAD. Patient has required transfusions for GIB in the past. Mostly recently back in 2021 pt had anemia with dark stools. No interventions was done at that time. However Last Endoscopy was done in 2020 (negative). Today labs show patient is anemic with H/H of 4.5/16.3,. INR is 8.84 MAP in the 90s, Temp 35.1. He denies any chest pain, shortness of breath, dizziness, abd pain, nausea or vomiting. found to have  rectal bleeding underwent endoscopy ,old blood in the proximal ileum ,  develop sepsis with LL opacity given Antibiotics , Extubated , reintubated , Bronchoscopy on Zosyn for LL pneumonia  and Amiodarone S/P TV Annuloplasty , patient remain intubated on full ventilatory support .S/P multiple units of blood transfusion , remain on full ventilatory support on Precedex and propofol , new central IJ line , diarrhea C diff. +ve on po vancomycin and IV Flagyl,  mildly distended belly , fever start on cefepime 2gm q 8 hrs S/P tracheostomy .  new RT Subclavian central line continue on contact  isolation ,S/P  C-diff antibiotics, no more diarrhea back on full support mechanical ventilator , chest x ray show improvement in LLL air space disease, more awake and responsive on tube feeding no more diarrhea ,  no nausea or vomiting or diarrhea still very weak and tired , back on tube feeding ,still on po vancomycin , getting PT and OT at bed side ,   , no SOB getting stronger , improve muscle tone patient transfer to monitor bed still on contact isolation for C-Difficel colitis on 50% FI02, and change to Suresh  tube feeding still loose stool . H/H drop significantly require blood transfusion , most likely GI bleeding , IV heparin D/C ,  H/H is stable ., patient develop TR sided  pneumothorax require chest tube placement , RT IJ central line  placed , develop fever shaking chills , blood culture positive for serratia marcescens , start on cefepime .the patient  become hypoxic and hypotensive placed on full ventilatory support and Vasopressin , levophed and Dobutamine ,S/P blood transfusion on meropenem and vancomycin ,   , on and  off pressors , occasional agitation on Precedex .S/P IR cholecystostomy tube drainage placement in the RT upper Quadrant , resume anticoagulation chest x ray noted C-PAP trail lasted only for 2 hrs , new RT SC line and D/C RT IJ line , RT pig tail cathter has been removed , tolerating C-PAP trial placed on trach. collar 50% FI02 GI consultant noted on NG tube feeding as tolerated , develop AF RVR S/P  bolus Amiodarone  back to regular sinus Rhythm , Flat Affect depressed , back on tube feeding Vital AF at 60 cc/ hr .still intermittent abdominal pain , no fever saturation is accepted  back on full ventilatory support ,   on methimazole for hyperthyroidism ,  condition is the same ,still C/O Abdominal pain , white count is improving , no chest pain or SOB ,  .placed on Reglan 10 mg TID for gastric motility , depressed , withdrawn. , S/P  mesenteric angiogram , unable to stent SMA S/P 3 units of blood transfusion   RT IJ in place reassessed for stent in the SMA by vascular,  dark stool stable H/H ,surgical opinion for possible Lap cholecystectomy. over night events noted develop respiratory distress, , rectal bleeding, require intubation placed on full ventilatory support , FI02 is 50% also became hemianosmically unstable require triple pressor support with levophed , vasopressin and norsynephrine, pan culture placed  on Vancomycin IV and PO  and Zosyn, sedated with propofol kept NPO , new central line RT and left IJ cathter placed . Diflucan Added .fever of 38.5 weaned off  vasopressin ,   fever adding IV vancomycin and  vancomycin solution  , and Bactrim , S/P  tracheostomy , bleeding receiving blood transfusion on vasopressin , no more bleeding , no fever , more awake and responsive ,tolerating tube feeding , ID and heart failure follow up appreciated , depresses no weaning for now , magnesium is low to give supplement too keep Above 2 , patient S/P  vascular procedure for superior mesenteric artery occlusion S/P 2 stent placement , patient tolerate it very well  , left upper extremities hematoma .vascular follow up appreciated , increase WBC , new RT UPE midline , black tarry stool , very loose R/O recurrent GI bleeding and C-dificile  colitis stable H/H no events over night , diarrhea is improved ,  WBC is improving  , hyponatremia no change , S/P FEEST study result is pending , PO feeding with observation . on Cipro antibiotics       (2021 16:57)    PAST MEDICAL & SURGICAL HISTORY:  CHF (congestive heart failure)    CAD (coronary artery disease)  Depression    Pleural effusion    History of 2019 novel coronavirus disease (COVID-19)  2020    Hemorrhoids    Bleeding hemorrhoids    Peripheral arterial disease    Claudication    BPH with urinary obstruction    ACC/AHA stage D systolic heart failure  Anticoagulation goal of INR 2.0 to 2.5    Falls    Clavicle fracture    CKD (chronic kidney disease), stage III    Iron deficiency anemia    H/O epistaxis    Vertigo    GI bleed    S/P TVR (tricuspid valve repair)    S/P ventricular assist device    S/P endoscopy    OBJECTIVE:  ICU Vital Signs Last 24 Hrs  T(C): 38.2 (2021 10:00), Max: 38.5 (2021 12:00)  T(F): 100.8 (2021 10:00), Max: 101.3 (2021 12:00)  HR: 65 (2021 10:00) (61 - 69)  BP: --  BP(mean): --  ABP: 105/67 (2021 10:00) (90/54 - 113/64)  ABP(mean): 77 (2021 10:00) (63 - 77)  RR: 20 (2021 10:00) (19 - 35)  SpO2: 99% (2021 10:00) (96% - 100%)       @ 07:01  -   @ 07:00  --------------------------------------------------------  IN: 2693.9 mL / OUT: 1415 mL / NET: 1278.9 mL     @ 07: @ 10:49  --------------------------------------------------------  IN: 420.8 mL / OUT: 115 mL / NET: 305.8 mL  PHYSICAL EXAM: Daily   Elderly male  on trach. collar  FI02 is 40% S/P tracheostomy   Daily Weight in k.4 (2021 00:00)  HEENT:     + NCAT  + EOMI  - Conjuctival edema   - Icterus   - Thrush   - ETT  + NGT/OGT  Neck:         + FROM  + JVD  - Nodes - Masses + Mid-line trachea + Tracheostomy  Chest:            normal A-P diameter    Lungs:          + CTA   + Rhonchi    - Rales    - Wheezing + Decreased  LT BS   - Dullness R L  Cardiac:       + S1 + S2    + RRR   - Irregular   - S3  - S4    - Murmurs   - Rub   - Hamman’s sign   Abdomen:    + BS  + Soft + Non-tender - Distended - Organomegaly - PEG .cholecystostomy tube in place  Extremities:   - Cyanosis U/L   - Clubbing  U/L  + LE/UE Edema LUE hematoma   + Capillary refill    + Pulses   Neuro:        - Awake   -  Alert   - Confused   - Lethargic   + Sedated  + Generalized Weakness  Skin:        - Rashes    - Erythema   + Normal incisions   + IV sites intact          HOSPITAL MEDICATIONS: All medications reviewed and analyzed  MEDICATIONS  (STANDING):  amiodarone    Tablet 200 milliGRAM(s) Oral daily  chlorhexidine 0.12% Liquid 15 milliLiter(s) Oral Mucosa every 12 hours  chlorhexidine 2% Cloths 1 Application(s) Topical <User Schedule>  dexmedetomidine Infusion 0.5 MICROgram(s)/kG/Hr (9.81 mL/Hr) IV Continuous <Continuous>  dextrose 50% Injectable 50 milliLiter(s) IV Push every 15 minutes  heparin  Infusion 400 Unit(s)/Hr (12.5 mL/Hr) IV Continuous <Continuous>  Hydromorphone  Injectable 0.5 milliGRAM(s) IV Push once  insulin lispro (ADMELOG) corrective regimen sliding scale   SubCutaneous every 6 hours  pantoprazole  Injectable 40 milliGRAM(s) IV Push every 12 hours  piperacillin/tazobactam IVPB.. 3.375 Gram(s) IV Intermittent every 8 hours  propofol Infusion 20 MICROgram(s)/kG/Min (9.42 mL/Hr) IV Continuous <Continuous>  sodium chloride 0.9% lock flush 3 milliLiter(s) IV Push every 8 hours  sodium chloride 0.9%. 1000 milliLiter(s) (10 mL/Hr) IV Continuous <Continuous>    MEDICATIONS  (PRN):  acetaminophen    Suspension .. 650 milliGRAM(s) Oral every 6 hours PRN Temp greater or equal to 38C (100.4F)    LABS: All Lab data reviewed and analyzed                                     9.3    12.69 )-----------( 272      ( 30 Oct 2021 00:48 )  10-30    129<L>  |  95<L>  |  11  ----------------------------<  110<H>  3.9   |  24  |  0.44<L>    Ca    9.3      30 Oct 2021 00:48  Phos  2.5     10-30  Mg     1.7     10-30    TPro  7.8  /  Alb  3.5  /  TBili  0.6  /  DBili  x   /  AST  87<H>  /  ALT  118<H>  /  AlkPhos  223<H>  10-30               29.3    Ca    9.6      28 Oct 2021 00:38  Phos  2.8     10-  Mg     1.8     10-28    TPro  7.2  /  Alb  3.5  /  TBili  0.6  /  DBili  x   /  AST  39  /  ALT  39  /  AlkPhos  192<H>  10-28               26.6   10    Ca    9.8      27 Oct 2021 00:24  Phos  2.4     10-27  Mg     1.6     10-                                                                                                                                                                                      PTT - ( 2021 04:52 )  PTT:45.2 sec LIVER FUNCTIONS - ( 2021 00:42 )  Alb: 3.4 g/dL / Pro: 6.7 g/dL / ALK PHOS: 213 U/L / ALT: 15 U/L / AST: 24 U/L / GGT: x           RADIOLOGY: - Reviewed and analyzed  , LVAD HM2, CT scan of abdomen reviewed result noted

## 2021-10-30 NOTE — CHART NOTE - NSCHARTNOTEFT_GEN_A_CORE
Mr. Quispe is a 66 year old man with PMHx of ACC/AHA stage D HF due to NICM HM2 LVAD , CKD-3 with hyperkalemia, admitted in early June for GIB complicated by VAP, serratia bacteremia with acalculous cholecystitis s/p percutaneous tube, thyrotoxicosis with hyperthyroidism likely related to amiodarone, and persistent abdominal pain from mesenteric ischemia from  MA stenosis that has prevented adequate enteral nutrition. He underwent angiogram on 9/10, stent was unable to be placed and course was then complicated by RP bleed. He continued to have persistent leukocytosis and febrile episodes and was noted to have positive sputum culture for serratia marcescens and completed his course of abx. He was initially decannulated on 9/23. Recently he started having multiples of melena, emesis and went into acte respiratory distress and was ultimately re-intubated on 9/26.     He had blood cultures positive for enterobacter cloacae/serratia and sputum positive for stenotrophomonas remains on IV antibiotics. He is s/p trach with ENT on 10/4. He underwent SMA stent x 2 on 10/18. His nausea and vomiting has improved, he is currently tolerating tube feeds (goal rate @ 60 cc/hr). Fecal occult blood was noted to be positive on 10/23, he is currently off DAPT. Has been transfused with multiple units of PRBC during this admission, last on 10/23. His overall prognosis remains poor, he is now DNR after family meeting.     Surgery team called about persistently elevated WBC and bacteremia, RUQ u/s showing small pericholecystic fluid and ascites    Recommendations  - perc dawn tube with output, Tbili 0.6, alk phos 223, AST/ALT 87/118  - no acute surgical intervention indicated, patient is not a surgical candidate  - Recommend IR tube study to determine if adequately draining  - C/w abx    Acute Care Surgery  [phone]

## 2021-10-31 NOTE — PROGRESS NOTE ADULT - SUBJECTIVE AND OBJECTIVE BOX
RICKY HAMPTON  MRN-[id number]  Patient is a 66y old  Male who presents with a chief complaint of Anemia, Supratherapeutic INR, Dark Stools (29 Oct 2021 20:56)    HPI:  65M PMH ACC/AHA stage D HF due to NICM HM2 LVAD , TV annuloplasty ring 17 as destination therapy due to severe peripheral artery disease with significant stenosis  SIADH, Depression, CKD-3 with hyperkalemia, past E. coli UTIs, driveline drainage (21) and COVID-19 (back in 2020)  He was recently seen in clinic where he complained of abdominal pain and dark stools w constipation back in May. He presents to [facility] ER today weakness and fatigue, moderate and + Black stools for three days, on coumadin secondary to warfarin use in the setting of an LVAD. Patient has required transfusions for GIB in the past. Mostly recently back in 2021 pt had anemia with dark stools. No interventions was done at that time. However Last Endoscopy was done in 2020 (negative). Today labs show patient is anemic with H/H of 4.5/16.3,. INR is 8.84 MAP in the 90s, Temp 35.1. He denies any chest pain, shortness of breath, dizziness, abd pain, nausea or vomiting.     (2021 16:57)    Surgery/Hospital Course:   admit for melena w/ anemia, INR 8.84   6/15 Capsul study (+) for small bowel bleed, balloon endoscopy (old blood in prox ileum); post EGD - septic w/ L opacity, re-intubated for concern for aspiration, TTE (Mod MR, decrease biV w/ interventricular septum boweing towards R)   bronch    +C Diff    CT C/A/P: Fluid filled colon which may be 2/2 rapid transit. Small bilateral pleffs with associates. Compressive atelectasis New ISABELLE & LLL  parenchymal opacities, suspicious for pneumonia. Moderate stenosis in the proximal superior mesenteric artery.    #8 Shiley trach at bedside    LVAD speed increased to 9200   Bronch   TC since . Patient transferred to SDU.    INR today 2.64.  H&H 7.3/24 this AM.  Will repeat CBC at noon, and will send stool guaiac Patient with persistent abdominal tenderness, rate of tube feeds decreased.  No nausea/vomiting.     INR today 2.4. H&H 9.1/28.6 low flow overnight /N&V, refusing Tube feeds on D5 1/2 normal  @50 cc/hr   INR 2.69  H&H 7.7/.1 refusing Tube feeds on D5 1/2 normal  @50 cc/hr. This am + BM Melena Dr Oneill HF  aware- PRBC x1  GI team consulted -  NPO  plan on study in am-  D/w Dr Cadet Patient  to return to CTU for further management; 1PRBCS    Post op INR 2.2 today.  No bleeding. BC + for SM.  Pt is hypotensive requiring pressor and inotropic support.  ID follow up today on Cefepime will follow.   R PTC for PTX    CT C/A/P: sub q emphysema in R chest wall, GGO RUL, small ascites CTH negative; Abd US: GB thickening, pericholecystic fluid     Perchole drain in place continues to drain total output overnight 133.  Fever today 38.8    duplex LE negative    Patient with persistent abdominal pain, refusing tube feeds and medications, Psych consulted   CTA A/P ordered to r/o mesenteric ischemia 2/2 persistent anorexia, nausea, vomiting. Revealed:  Evaluation of the mesenteric vessels is limited by streak artifact from LVAD. There appears to be severe stenosis of the proximal SMA; abdominal mesenteric doppler is recommended for further evaluation. 2.  No small bowel findings to suggest acute mesenteric ischemia. 3.  Focal dissections involving the right and left common iliac arteries.  8/15: Cultures resulted BC 1/2 +GPC in clusters, SC enterobacter; mesenteric duplex: borderline stenosis of proximal SMA  : CT C/A/P noncon: Nondular opacities in R lung apex w/cavitation, abd nl  :  Continue current care, treatment of thyrotoxicosis with medications as per endocrine, d/c ABX as per team.    RUQ sono: Contracted gallbladder with cholecystostomy tube in place.  9/10 failed SMA stent, c/b RP bleed s/p 3U PRCB   CTA Abd/Pelvis L RP hematoma    Trach decannulated    intubated fro resp distress for increased WOB, LL pneumonia; CT C/A/P: progressive ISABELLE opacities and L consolidations, multifocal pneumonia    TTE (EF 25%, decreased RV, mod MR)   10/3 VT -> Lidocaine gtt   10/4 Trach size 6 DCT cuff  10/5 CT abd (neg for intra-abd abcess, severe stenosis of prox SMA and b/l iliac arteries)  10/18 SMA Stent   10/23 Emend for nausea   10/24 +Occult    Today:  - Patient was tachypneic 30-40s in AM, CXR showed no acute pathology, saturating well, likely psychosocial in origin. Otherwise tolerating TC without issue  - Tube feeds at 50/hr with Pureed diet, tolerating without issue  - Follow up with GI regarding potential contrast study for perc sybil drain    REVIEW OF SYSTEMS:  Speak over trach   Gen: No fever  EYES/ENT: No visual changes;  No vertigo or throat pain   NECK: No pain   RES:  Some SOB briefly, otherwise no Cough  CARD: No chest pain   GI: +intermittently c/o abd pain and nausea   : No dysuria  NEURO: No weakness  SKIN: No itching, rashes     ICU Vital Signs Last 24 Hrs  T(C): 36.5 (30 Oct 2021 04:00), Max: 36.7 (29 Oct 2021 08:00)  T(F): 97.7 (30 Oct 2021 04:00), Max: 98 (29 Oct 2021 08:00)  HR: 103 (30 Oct 2021 05:00) (85 - 119)  BP(mean): 80 (30 Oct 2021 04:00) (80 - 85)  RR: 18 (30 Oct 2021 05:00) (16 - 49)  SpO2: 98% (30 Oct 2021 05:00) (87% - 100%)    Physical Exam:  Gen:  NAD  CNS: intermittently agitated but responds to commands  Neck: no JVD, +trach, +central line   RES : coarse breath sounds, no wheezing    CVS: +LVAD hum   Abd: Soft. Sybil drain with bilious fluid. Positive BS throughout. Mild RUQ abdominal tenderness by perc sybil.  Skin: No rash, erythema, cyanosis.  Vasc: Warm and well-perfused.  Ext:  no edema    ============================I/O===========================   I&O's Detail    30 Oct 2021 07:  -  31 Oct 2021 07:00  --------------------------------------------------------  IN:    Enteral Tube Flush: 90 mL    IV PiggyBack: 50 mL    Miscellaneous Tube Feedin mL    sodium chloride 0.9%: 120 mL  Total IN: 1460 mL    OUT:    Drain (mL): 300 mL    Voided (mL): 650 mL  Total OUT: 950 mL    Total NET: 510 mL      31 Oct 2021 07:01  -  31 Oct 2021 17:24  --------------------------------------------------------  IN:    Miscellaneous Tube Feedin mL    sodium chloride 0.9%: 40 mL  Total IN: 440 mL    OUT:    Voided (mL): 330 mL  Total OUT: 330 mL    Total NET: 110 mL    ============================ LABS =========================                                   9.2    9.93  )-----------( 299      ( 31 Oct 2021 00:43 )             28.3     10-31    131<L>  |  96  |  21  ----------------------------<  106<H>  4.3   |  23  |  0.44<L>    Ca    9.4      31 Oct 2021 00:43  Phos  2.9     10-31  Mg     1.6     10-31    TPro  7.7  /  Alb  3.5  /  TBili  0.5  /  DBili  x   /  AST  67<H>  /  ALT  118<H>  /  AlkPhos  220<H>  10-31       ======================Micro/Rad/Cardio=================  Culture: Reviewed   CXR: Reviewed  Echo:Reviewed    ======================================================  PAST MEDICAL & SURGICAL HISTORY:  CHF (congestive heart failure)    CAD (coronary artery disease)    Depression    Pleural effusion    History of  novel coronavirus disease (COVID-19)  2020    Hemorrhoids    Bleeding hemorrhoids    Peripheral arterial disease    Claudication    BPH with urinary obstruction    ACC/AHA stage D systolic heart failure    Anticoagulation goal of INR 2.0 to 2.5    Falls    Clavicle fracture    CKD (chronic kidney disease), stage III    Iron deficiency anemia    H/O epistaxis    Vertigo    GI bleed    S/P TVR (tricuspid valve repair)    S/P ventricular assist device    S/P endoscopy    ====================ASSESSMENT ==============  Stage D Nonischemic Cardiomyopathy, Status Post HM2 on 2017    Cardiogenic shock  Hemodynamic instability   Acute hypoxemic respiratory failure s/p trach , decannulated on ; Reintubated on    GI bleed , Status Post Enteroscopy   Anemia, in setting of melena   Chronic Kidney Disease  Stress hyperglycemia   C.diff positive on    Hypovolemic shock  Septic shock  Leukocytosis  GB thickening/percholecystic s/p perc sybil by IR   SMA stenosis s/p SMA Stent on 10/18   Serratia/citrobacter pneumonia   Stenotrophomonas pneumonia   Enterobacter pneumonia   Nasuea/vomiting  Deconditioning  Hyponatremia  DNR / No vent    Plan:  ====================== NEUROLOGY=====================  Intermittently agitated  OOB, ambulate as tolerated  Continue close monitoring of neuro status   C/w sertraline, mirtazapine for depression   PRN Fentanyl for pain     fentaNYL    Injectable 12 MICROGram(s) IV Push every 3 hours PRN Moderate to severe pain  mirtazapine 7.5 milliGRAM(s) Oral at bedtime  sertraline 100 milliGRAM(s) Oral daily    ==================== RESPIRATORY======================  Acute hypoxemic respiratory failure s/p #8 shiley trach on ; decannulated on ; Reintubated on ; trach size 6 DCT cuff on 10/4   Continue close monitoring of respiratory rate and breathing pattern, following of ABG’s with Santa Claus monitoring, continuous pulse oximetry monitoring.   Tolerating TC since 10/19, continue TC as tolerated   Guaifenesin PO PRN mucous plugging     DNR/No vent     guaiFENesin Oral Liquid (Sugar-Free) 200 milliGRAM(s) Oral every 4 hours PRN mucous plugging    ====================CARDIOVASCULAR==================  Stage D Nonischemic Cardiomyopathy, Status Post HM2 on 2017; LVAD settings 9200, flow 5.0  TTE 10/4:  Severely decreased LV systolic function, Diffuse hypokinesis. Mild AR. No pericardial effusion   VT resolved, continue rate control w/ Lopressor   ASA/Plavix for recent SMA stent on hold due to drop in h/h and +occult feces on 10/24    metoprolol tartrate 12.5 milliGRAM(s) Oral every 12 hours    ===================HEMATOLOGIC/ONC ===================  CTA A/P on  +L RP hematoma;   Acute blood loss anemia, monitor H&H/Plts   LUE US on 10/20 with no pseudoaneurysm  ASA/Plavix for recent SMA stent on hold for recent drop in h/h in setting of (+) guaiac (on 10/24)     ===================== RENAL =========================  Hx of CKD, continue monitoring urine output, I&OS, BUN/Cr   Replete lytes PRN. Keep K> 4 and Mg >2  Hyponatremia, NA rising 128->129    ==================== GASTROINTESTINAL===================  CT A/P 10/5 neg for intra-abd abcess, severe stenosis of prox SMA and b/l iliac arteries  CTA A/P : Evaluation of the mesenteric vessels is limited by streak artifact from LVAD. There appears to be severe stenosis of the proximal SMA; abdominal mesenteric doppler is recommended for further evaluation. 2.  No small bowel findings to suggest acute mesenteric ischemia. 3.  Focal dissections involving the right and left common iliac arteries.  Stenosis of proximal SMA, s/p failed SMA stent c/b RP bleed on 9/10 - SMA stent with Vascular 10/18   Simethicone PRN for gas   Reglan for gut motility  Zofran for nausea   +occult feces on 10/24   RUQ U/S findings of pericholecystic fluid despite perc sybil drain in place as well as ascites, will follow-up with GI regarding potential contrast study  Tolerating tube feeds, Vital @ 50cc/hr. Passed FEES 10/29, now able to supplement NGT feedings with PO pureed diet with mildly thickened liquids when sitting upright OOBTC    urea Oral Powder 15 Gram(s) Oral daily  multivitamin 1 Tablet(s) Oral daily  GI prophylaxis, pantoprazole  Injectable 40 milliGRAM(s) IV Push every 12 hours  simethicone 80 milliGRAM(s) Chew every 8 hours PRN Gas  ondansetron Injectable 8 milliGRAM(s) IV Push every 8 hours  sodium chloride 0.9%. 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   BCx 10/14 and SCX on 10/14 +Stenotrophomonas maltophilia s/p Bactrim   Empiric coverage with Ciprofloxacin for chronic suppression, PO Vanco for C. Diff ppx  Will discuss with Gen Swilliam RUQ U/S findings of pericholecystic fluid despite perc sybil drain in place as well as ascites    ciprofloxacin     Tablet 500 milliGRAM(s) Oral every 12 hours  vancomycin    Solution 125 milliGRAM(s) Oral every 12 hours    I have spent 30 minutes of noncontinuous critical care time with this patient.    Patient requires continuous monitoring with bedside rhythm monitoring, pulse ox monitoring, and intermittent blood gas analysis. Care plan discussed with ICU care team. Patient remained critical and at risk for life threatening decompensation.

## 2021-10-31 NOTE — PROGRESS NOTE ADULT - SUBJECTIVE AND OBJECTIVE BOX
RICKY JOINT  MRN#: [id number]  Subjective:  Pulmonary progress  : recurrent Acute hypoxic respiratory Failure ,aspiration pneumonia, NICM  ,   65M PMH ACC/AHA stage D HF due to NICM HM2 LVAD , TV annuloplasty ring 17 as destination therapy due to severe peripheral artery disease with significant stenosis  SIADH, Depression, CKD-3 with hyperkalemia, past E. coli UTIs, driveline drainage (21) and COVID-19 (back in 2020)  He was recently seen in clinic where he complained of abdominal pain and dark stools w constipation back in May. He presents to [facility] ER today weakness and fatigue, moderate and + Black stools for three days, on coumadin secondary to warfarin use in the setting of an LVAD. Patient has required transfusions for GIB in the past. Mostly recently back in 2021 pt had anemia with dark stools. No interventions was done at that time. However Last Endoscopy was done in 2020 (negative). Today labs show patient is anemic with H/H of 4.5/16.3,. INR is 8.84 MAP in the 90s, Temp 35.1. He denies any chest pain, shortness of breath, dizziness, abd pain, nausea or vomiting. found to have  rectal bleeding underwent endoscopy ,old blood in the proximal ileum ,  develop sepsis with LL opacity given Antibiotics , Extubated , reintubated , Bronchoscopy on Zosyn for LL pneumonia  and Amiodarone S/P TV Annuloplasty , patient remain intubated on full ventilatory support .S/P multiple units of blood transfusion , remain on full ventilatory support on Precedex and propofol , new central IJ line , diarrhea C diff. +ve on po vancomycin and IV Flagyl,  mildly distended belly , fever start on cefepime 2gm q 8 hrs S/P tracheostomy .  new RT Subclavian central line continue on contact  isolation ,S/P  C-diff antibiotics, no more diarrhea back on full support mechanical ventilator , chest x ray show improvement in LLL air space disease, more awake and responsive on tube feeding no more diarrhea ,  no nausea or vomiting or diarrhea still very weak and tired , back on tube feeding ,still on po vancomycin , getting PT and OT at bed side ,   , no SOB getting stronger , improve muscle tone patient transfer to monitor bed still on contact isolation for C-Difficel colitis on 50% FI02, and change to Suresh  tube feeding still loose stool . H/H drop significantly require blood transfusion , most likely GI bleeding , IV heparin D/C ,  H/H is stable ., patient develop TR sided  pneumothorax require chest tube placement , RT IJ central line  placed , develop fever shaking chills , blood culture positive for serratia marcescens , start on cefepime .the patient  become hypoxic and hypotensive placed on full ventilatory support and Vasopressin , levophed and Dobutamine ,S/P blood transfusion on meropenem and vancomycin ,   , on and  off pressors , occasional agitation on Precedex .S/P IR cholecystostomy tube drainage placement in the RT upper Quadrant , resume anticoagulation chest x ray noted C-PAP trail lasted only for 2 hrs , new RT SC line and D/C RT IJ line , RT pig tail cathter has been removed , tolerating C-PAP trial placed on trach. collar 50% FI02 GI consultant noted on NG tube feeding as tolerated , develop AF RVR S/P  bolus Amiodarone  back to regular sinus Rhythm , Flat Affect depressed , back on tube feeding Vital AF at 60 cc/ hr .still intermittent abdominal pain , no fever saturation is accepted  back on full ventilatory support ,   on methimazole for hyperthyroidism ,  condition is the same ,still C/O Abdominal pain , white count is improving , no chest pain or SOB ,  .placed on Reglan 10 mg TID for gastric motility , depressed , withdrawn. , S/P  mesenteric angiogram , unable to stent SMA S/P 3 units of blood transfusion   RT IJ in place reassessed for stent in the SMA by vascular,  dark stool stable H/H ,surgical opinion for possible Lap cholecystectomy. over night events noted develop respiratory distress, , rectal bleeding, require intubation placed on full ventilatory support , FI02 is 50% also became hemianosmically unstable require triple pressor support with levophed , vasopressin and norsynephrine, pan culture placed  on Vancomycin IV and PO  and Zosyn, sedated with propofol kept NPO , new central line RT and left IJ cathter placed . Diflucan Added .fever of 38.5 weaned off  vasopressin ,   fever adding IV vancomycin and  vancomycin solution  , and Bactrim , S/P  tracheostomy , bleeding receiving blood transfusion on vasopressin , no more bleeding , no fever , more awake and responsive ,tolerating tube feeding , ID and heart failure follow up appreciated , depresses no weaning for now , magnesium is low to give supplement too keep Above 2 , patient S/P  vascular procedure for superior mesenteric artery occlusion S/P 2 stent placement , patient tolerate it very well  , left upper extremities hematoma .vascular follow up appreciated , increase WBC , new RT UPE midline , black tarry stool , very loose R/O recurrent GI bleeding and C-dificile  colitis stable H/H no events over night , diarrhea is improved ,  WBC is improving  , hyponatremia no change , S/P FEEST study result is pending , PO feeding with observation . on Cipro antibiotics  no major over night events      (2021 16:57)    PAST MEDICAL & SURGICAL HISTORY:  CHF (congestive heart failure)    CAD (coronary artery disease)  Depression    Pleural effusion    History of 2019 novel coronavirus disease (COVID-19)  2020    Hemorrhoids    Bleeding hemorrhoids    Peripheral arterial disease    Claudication    BPH with urinary obstruction    ACC/AHA stage D systolic heart failure  Anticoagulation goal of INR 2.0 to 2.5    Falls    Clavicle fracture    CKD (chronic kidney disease), stage III    Iron deficiency anemia    H/O epistaxis    Vertigo    GI bleed    S/P TVR (tricuspid valve repair)    S/P ventricular assist device    S/P endoscopy    OBJECTIVE:  ICU Vital Signs Last 24 Hrs  T(C): 38.2 (2021 10:00), Max: 38.5 (2021 12:00)  T(F): 100.8 (2021 10:00), Max: 101.3 (2021 12:00)  HR: 65 (2021 10:00) (61 - 69)  BP: --  BP(mean): --  ABP: 105/67 (2021 10:00) (90/54 - 113/64)  ABP(mean): 77 (2021 10:00) (63 - 77)  RR: 20 (2021 10:00) (19 - 35)  SpO2: 99% (2021 10:00) (96% - 100%)       @ 07:01  -   @ 07:00  --------------------------------------------------------  IN: 2693.9 mL / OUT: 1415 mL / NET: 1278.9 mL     @ 07: @ 10:49  --------------------------------------------------------  IN: 420.8 mL / OUT: 115 mL / NET: 305.8 mL  PHYSICAL EXAM: Daily   Elderly male  on trach. collar  FI02 is 40% S/P tracheostomy   Daily Weight in k.4 (2021 00:00)  HEENT:     + NCAT  + EOMI  - Conjuctival edema   - Icterus   - Thrush   - ETT  + NGT/OGT  Neck:         + FROM  + JVD  - Nodes - Masses + Mid-line trachea + Tracheostomy  Chest:            normal A-P diameter    Lungs:          + CTA   + Rhonchi    - Rales    - Wheezing + Decreased  LT BS   - Dullness R L  Cardiac:       + S1 + S2    + RRR   - Irregular   - S3  - S4    - Murmurs   - Rub   - Hamman’s sign   Abdomen:    + BS  + Soft + Non-tender - Distended - Organomegaly - PEG .cholecystostomy tube in place  Extremities:   - Cyanosis U/L   - Clubbing  U/L  + LE/UE Edema LUE hematoma   + Capillary refill    + Pulses   Neuro:        - Awake   -  Alert   - Confused   - Lethargic   + Sedated  + Generalized Weakness  Skin:        - Rashes    - Erythema   + Normal incisions   + IV sites intact          HOSPITAL MEDICATIONS: All medications reviewed and analyzed  MEDICATIONS  (STANDING):  amiodarone    Tablet 200 milliGRAM(s) Oral daily  chlorhexidine 0.12% Liquid 15 milliLiter(s) Oral Mucosa every 12 hours  chlorhexidine 2% Cloths 1 Application(s) Topical <User Schedule>  dexmedetomidine Infusion 0.5 MICROgram(s)/kG/Hr (9.81 mL/Hr) IV Continuous <Continuous>  dextrose 50% Injectable 50 milliLiter(s) IV Push every 15 minutes  heparin  Infusion 400 Unit(s)/Hr (12.5 mL/Hr) IV Continuous <Continuous>  Hydromorphone  Injectable 0.5 milliGRAM(s) IV Push once  insulin lispro (ADMELOG) corrective regimen sliding scale   SubCutaneous every 6 hours  pantoprazole  Injectable 40 milliGRAM(s) IV Push every 12 hours  piperacillin/tazobactam IVPB.. 3.375 Gram(s) IV Intermittent every 8 hours  propofol Infusion 20 MICROgram(s)/kG/Min (9.42 mL/Hr) IV Continuous <Continuous>  sodium chloride 0.9% lock flush 3 milliLiter(s) IV Push every 8 hours  sodium chloride 0.9%. 1000 milliLiter(s) (10 mL/Hr) IV Continuous <Continuous>    MEDICATIONS  (PRN):  acetaminophen    Suspension .. 650 milliGRAM(s) Oral every 6 hours PRN Temp greater or equal to 38C (100.4F)    LABS: All Lab data reviewed and analyzed                                      9.2    9.93  )-----------( 299      ( 31 Oct 2021 00:43 )             28.3   10-    131<L>  |  96  |  21  ----------------------------<  106<H>  4.3   |  23  |  0.44<L>    Ca    9.4      31 Oct 2021 00:43  Phos  2.9     10-31  Mg     1.6     10-    TPro  7.7  /  Alb  3.5  /  TBili  0.5  /  DBili  x   /  AST  67<H>  /  ALT  118<H>  /  AlkPhos  220<H>  10-31    Ca    9.3      30 Oct 2021 00:48  Phos  2.5     10-30  Mg     1.7     10-    TPro  7.8  /  Alb  3.5  /  TBili  0.6  /  DBili  x   /  AST  87<H>  /  ALT  118<H>  /  AlkPhos  223<H>  10-30               29.3    Ca    9.6      28 Oct 2021 00:38  Phos  2.8     10-28  Mg     1.8     10-    TPro  7.2  /  Alb  3.5  /  TBili  0.6  /  DBili  x   /  AST  39  /  ALT  39  /  AlkPhos  192<H>  10               26.6   10-    Ca    9.8      27 Oct 2021 00:24  Phos  2.4     10-27  Mg     1.6     10-                                                                                                                                                                                      PTT - ( 2021 04:52 )  PTT:45.2 sec LIVER FUNCTIONS - ( 2021 00:42 )  Alb: 3.4 g/dL / Pro: 6.7 g/dL / ALK PHOS: 213 U/L / ALT: 15 U/L / AST: 24 U/L / GGT: x           RADIOLOGY: - Reviewed and analyzed  , LVAD HM2, CT scan of abdomen reviewed result noted

## 2021-11-01 NOTE — PROGRESS NOTE ADULT - ASSESSMENT
Mental status exam: Seen resting in bed. Awake, trach collar. Alert and oriented x3. Improved ability to communicate. Trying to speak over trach with some success. Thought process goal oriented; content appropriate to conversation. Mood "ok." Affect engaged, less constricted. Denies symptoms of hopelessness, depression. Denies SI/HI. Insight and judgment adequate.      Psychological assessment: Lying in bed, awake. Engaged in assessment. Reports he is "happy" he's able to eat but doesn't like the food. Reports he did not ambulate with PT today because of discomfort at trach site. Denies abdominal pain. Mood "ok." Denies symptoms of hopelessness, depression. Denies feeling worried or nervous. Denies SI/HI. Denies any abdominal pain.  No signs of delirium. Receptive to support and validation.     Dx: Adjustment disorder with anxiety and depressed mood. F43.23 Systolic heart failure I50.2  LVAD Z95.811. Delirium, mixed hypoactive/hyperactive. F05    Recommendations:   When possible, take outside weather permitting    Provide emotional support   Behavioral Cardiology will follow      18 minutes spent on total patient encounter    Jessica Hennessy, PhD  [phone]

## 2021-11-01 NOTE — PROGRESS NOTE ADULT - PROBLEM SELECTOR PLAN 2
-Pt now at physiologic dosing of steroids so no need to test BS at this point.  -discussed with ELVIE TAYLOR.  Sally Beatty MD  Endocrinology Attending  Mondays and Tuesdays 9am-6pm: [phone] (pager)  Other days, night and weekend: [phone]

## 2021-11-01 NOTE — PROGRESS NOTE ADULT - ASSESSMENT
67 YO M with a history of stage D NICM s/p HM2 on 9/2017 as DT (due to severe PAD) with TV ring, prior COVID-19 infection 4/2020, recurrent syncope post LVAD s/p ILR, and chronic abdominal pain with prior negative workup who was admitted 6/14/21 with symptomatic anemia with Hgb 4.5 in setting of INR 8.8 without hemodynamic instability and has since had a protracted hospitalization. He was transfused and underwent VCE which showed active bleeding in the mid small bowel but subsequent enteroscopy 6/15 did not reveal any active bleeding. He acutely decompensated after procedure with fever/hypertension, low flow alarms, and pulmonary infiltrate with hypoxia requiring intubation from probable aspiration PNA. He was unable to extubated and has since undergone tracheostomy but tolerating persistent trach collar and nearing ability for decannulation. 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 He underwent angiogram on 9/10, stent was unable to be placed and course was then complicated by RP bleed. He continued to have persistent leukocytosis and febrile episodes and was noted to have positive sputum culture for serratia marcescens and completed his course of abx. He was initially decannulated on 9/23. Recently he started having multiples of melena, emesis and went into acte respiratory distress and was ultimately re-intubated on 9/26.     He had blood cultures positive for enterobacter cloacae/serratia and sputum positive for stenotrophomonas remains on IV antibiotics. He is s/p trach with ENT on 10/4. He underwent SMA stent x 2 on 10/18. His nausea and vomiting has improved, he is currently tolerating tube feeds (goal rate @ 60 cc/hr). Fecal occult blood was noted to be positive on 10/23, he is currently off DAPT. Has been transfused with multiple units of PRBC during this admission, last on 10/23. His overall prognosis remains poor, he is now DNR after family meeting.  67 YO M with a history of stage D NICM s/p HM2 on 9/2017 as DT (due to severe PAD) with TV ring, prior COVID-19 infection 4/2020, recurrent syncope post LVAD s/p ILR, and chronic abdominal pain with prior negative workup who was admitted 6/14/21 with symptomatic anemia with Hgb 4.5 in setting of INR 8.8 without hemodynamic instability and has since had a protracted hospitalization. He was transfused and underwent VCE which showed active bleeding in the mid small bowel but subsequent enteroscopy 6/15 did not reveal any active bleeding. He acutely decompensated after procedure with fever/hypertension, low flow alarms, and pulmonary infiltrate with hypoxia requiring intubation from probable aspiration PNA. He was unable to extubated and has since undergone tracheostomy but tolerating persistent trach collar and nearing ability for decannulation. 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 He underwent angiogram on 9/10, stent was unable to be placed and course was then complicated by RP bleed. He continued to have persistent leukocytosis and febrile episodes and was noted to have positive sputum culture for serratia marcescens and completed his course of abx. He was initially decannulated on 9/23. Recently he started having multiples of melena, emesis and went into acte respiratory distress and was ultimately re-intubated on 9/26.     He had blood cultures positive for enterobacter cloacae/serratia and sputum positive for stenotrophomonas remains on IV antibiotics. He is s/p trach with ENT on 10/4. He underwent SMA stent x 2 on 10/18. His nausea and vomiting has improved, and he is now tolerating PO diet along with tube feeds. Fecal occult blood was noted to be positive on 10/23, he is currently off DAPT. Has been transfused with multiple units of PRBC during this admission, last on 10/23. His overall prognosis remains poor, he is now DNR after family meeting.

## 2021-11-01 NOTE — PROGRESS NOTE ADULT - SUBJECTIVE AND OBJECTIVE BOX
Chief Complaint/Follow-up on:     Subjective:    MEDICATIONS  (STANDING):  chlorhexidine 2% Cloths 1 Application(s) Topical <User Schedule>  ciprofloxacin     Tablet 500 milliGRAM(s) Oral every 12 hours  insulin lispro (ADMELOG) corrective regimen sliding scale   SubCutaneous every 6 hours  magnesium oxide 400 milliGRAM(s) Oral every 12 hours  methimazole 15 milliGRAM(s) Oral daily  metoclopramide Injectable 10 milliGRAM(s) IV Push every 8 hours  metoprolol tartrate 12.5 milliGRAM(s) Oral every 12 hours  mirtazapine 7.5 milliGRAM(s) Oral at bedtime  multivitamin 1 Tablet(s) Oral daily  ondansetron Injectable 8 milliGRAM(s) IV Push every 8 hours  pantoprazole  Injectable 40 milliGRAM(s) IV Push every 12 hours  predniSONE   Tablet 2 milliGRAM(s) Oral daily  sertraline 100 milliGRAM(s) Oral daily  thiamine 100 milliGRAM(s) Oral daily  urea Oral Powder 15 Gram(s) Oral daily  vancomycin    Solution 125 milliGRAM(s) Oral every 12 hours    MEDICATIONS  (PRN):  fentaNYL    Injectable 12 MICROGram(s) IV Push every 3 hours PRN Moderate to severe pain  guaiFENesin Oral Liquid (Sugar-Free) 200 milliGRAM(s) Oral every 4 hours PRN mucous plugging  simethicone 80 milliGRAM(s) Chew every 8 hours PRN Gas      PHYSICAL EXAM:  VITALS: T(C): 36.7 (11-01-21 @ 12:00)  T(F): 98 (11-01-21 @ 12:00), Max: 98.8 (11-01-21 @ 00:00)  HR: 116 (11-01-21 @ 12:00) (93 - 126)  BP: --  RR:  (15 - 28)  SpO2:  (100% - 100%)  Wt(kg): --  GENERAL: NAD, well-groomed, well-developed  EYES: No proptosis, no injection  HEENT:  Atraumatic, Normocephalic, moist mucous membranes  THYROID: Normal size, no palpable nodules  RESPIRATORY: Clear to auscultation bilaterally; No rales, rhonchi, wheezing, or rubs  CARDIOVASCULAR: Regular rate and rhythm; No murmurs; no peripheral edema  GI: Soft, nontender, non distended, normal bowel sounds  CUSHING'S SIGNS: no striae    POCT Blood Glucose.: 155 mg/dL (11-01-21 @ 11:20)  POCT Blood Glucose.: 124 mg/dL (11-01-21 @ 05:32)  POCT Blood Glucose.: 122 mg/dL (11-01-21 @ 00:03)  POCT Blood Glucose.: 126 mg/dL (10-31-21 @ 17:06)  POCT Blood Glucose.: 122 mg/dL (10-31-21 @ 12:41)  POCT Blood Glucose.: 131 mg/dL (10-31-21 @ 06:19)  POCT Blood Glucose.: 110 mg/dL (10-31-21 @ 00:29)  POCT Blood Glucose.: 143 mg/dL (10-30-21 @ 18:21)  POCT Blood Glucose.: 132 mg/dL (10-30-21 @ 12:13)  POCT Blood Glucose.: 122 mg/dL (10-30-21 @ 05:23)  POCT Blood Glucose.: 115 mg/dL (10-30-21 @ 00:24)  POCT Blood Glucose.: 115 mg/dL (10-29-21 @ 17:30)    11-01    130<L>  |  96  |  24<H>  ----------------------------<  122<H>  4.3   |  24  |  0.45<L>    EGFR if : 138  EGFR if non : 119    Ca    9.5      11-01  Mg     1.8     11-01  Phos  2.9     11-01    TPro  7.9  /  Alb  3.6  /  TBili  0.5  /  DBili  x   /  AST  53<H>  /  ALT  102<H>  /  AlkPhos  212<H>  11-01          Thyroid Function Tests:  10-25 @ 03:20 TSH -- FreeT4 1.2 T3 -- Anti TPO -- Anti Thyroglobulin Ab -- TSI --  10-18 @ 13:02 TSH -- FreeT4 2.5 T3 67 Anti TPO -- Anti Thyroglobulin Ab -- TSI --                           Chief Complaint/Follow-up on: Hyperthyroidism    Subjective: Patient has no complaints. He denies cp or sob.     MEDICATIONS  (STANDING):  chlorhexidine 2% Cloths 1 Application(s) Topical <User Schedule>  ciprofloxacin     Tablet 500 milliGRAM(s) Oral every 12 hours  insulin lispro (ADMELOG) corrective regimen sliding scale   SubCutaneous every 6 hours  magnesium oxide 400 milliGRAM(s) Oral every 12 hours  methimazole 15 milliGRAM(s) Oral daily  metoclopramide Injectable 10 milliGRAM(s) IV Push every 8 hours  metoprolol tartrate 12.5 milliGRAM(s) Oral every 12 hours  mirtazapine 7.5 milliGRAM(s) Oral at bedtime  multivitamin 1 Tablet(s) Oral daily  ondansetron Injectable 8 milliGRAM(s) IV Push every 8 hours  pantoprazole  Injectable 40 milliGRAM(s) IV Push every 12 hours  predniSONE   Tablet 2 milliGRAM(s) Oral daily  sertraline 100 milliGRAM(s) Oral daily  thiamine 100 milliGRAM(s) Oral daily  urea Oral Powder 15 Gram(s) Oral daily  vancomycin    Solution 125 milliGRAM(s) Oral every 12 hours    MEDICATIONS  (PRN):  fentaNYL    Injectable 12 MICROGram(s) IV Push every 3 hours PRN Moderate to severe pain  guaiFENesin Oral Liquid (Sugar-Free) 200 milliGRAM(s) Oral every 4 hours PRN mucous plugging  simethicone 80 milliGRAM(s) Chew every 8 hours PRN Gas      PHYSICAL EXAM:  VITALS: T(C): 36.7 (11-01-21 @ 12:00)  T(F): 98 (11-01-21 @ 12:00), Max: 98.8 (11-01-21 @ 00:00)  HR: 116 (11-01-21 @ 12:00) (93 - 126)  BP: --  RR:  (15 - 28)  SpO2:  (100% - 100%)  Wt(kg): --  GENERAL: NAD, well-groomed, well-developed  EYES: No proptosis, no injection  HEENT:  Atraumatic, Normocephalic, moist mucous membranes, +trach  CARDIOVASCULAR: +tachycardia 100-110; No murmurs; no peripheral edema  GI: Soft, nontender, non distended, normal bowel sounds      POCT Blood Glucose.: 155 mg/dL (11-01-21 @ 11:20)  POCT Blood Glucose.: 124 mg/dL (11-01-21 @ 05:32)  POCT Blood Glucose.: 122 mg/dL (11-01-21 @ 00:03)  POCT Blood Glucose.: 126 mg/dL (10-31-21 @ 17:06)  POCT Blood Glucose.: 122 mg/dL (10-31-21 @ 12:41)  POCT Blood Glucose.: 131 mg/dL (10-31-21 @ 06:19)  POCT Blood Glucose.: 110 mg/dL (10-31-21 @ 00:29)  POCT Blood Glucose.: 143 mg/dL (10-30-21 @ 18:21)  POCT Blood Glucose.: 132 mg/dL (10-30-21 @ 12:13)  POCT Blood Glucose.: 122 mg/dL (10-30-21 @ 05:23)  POCT Blood Glucose.: 115 mg/dL (10-30-21 @ 00:24)  POCT Blood Glucose.: 115 mg/dL (10-29-21 @ 17:30)    11-01    130<L>  |  96  |  24<H>  ----------------------------<  122<H>  4.3   |  24  |  0.45<L>    EGFR if : 138  EGFR if non : 119    Ca    9.5      11-01  Mg     1.8     11-01  Phos  2.9     11-01    TPro  7.9  /  Alb  3.6  /  TBili  0.5  /  DBili  x   /  AST  53<H>  /  ALT  102<H>  /  AlkPhos  212<H>  11-01          Thyroid Function Tests:  10-25 @ 03:20 TSH -- FreeT4 1.2   10-18 @ 13:02 FreeT4 2.5 T3 67

## 2021-11-01 NOTE — PROGRESS NOTE ADULT - SUBJECTIVE AND OBJECTIVE BOX
Subjective: Patient seen and examined resting in bed.    Medications:  chlorhexidine 2% Cloths 1 Application(s) Topical <User Schedule>  ciprofloxacin     Tablet 500 milliGRAM(s) Oral every 12 hours  fentaNYL    Injectable 12 MICROGram(s) IV Push every 3 hours PRN  guaiFENesin Oral Liquid (Sugar-Free) 200 milliGRAM(s) Oral every 4 hours PRN  insulin lispro (ADMELOG) corrective regimen sliding scale   SubCutaneous every 6 hours  methimazole 15 milliGRAM(s) Oral daily  metoclopramide Injectable 10 milliGRAM(s) IV Push every 8 hours  metoprolol tartrate 12.5 milliGRAM(s) Oral every 12 hours  mirtazapine 7.5 milliGRAM(s) Oral at bedtime  multivitamin 1 Tablet(s) Oral daily  ondansetron Injectable 8 milliGRAM(s) IV Push every 8 hours  pantoprazole  Injectable 40 milliGRAM(s) IV Push every 12 hours  predniSONE   Tablet 2 milliGRAM(s) Oral daily  sertraline 100 milliGRAM(s) Oral daily  simethicone 80 milliGRAM(s) Chew every 8 hours PRN  thiamine 100 milliGRAM(s) Oral daily  urea Oral Powder 15 Gram(s) Oral daily  vancomycin    Solution 125 milliGRAM(s) Oral every 12 hours      ICU Vital Signs Last 24 Hrs  T(C): 36.8 (01 Nov 2021 08:00), Max: 37.1 (01 Nov 2021 00:00)  T(F): 98.2 (01 Nov 2021 08:00), Max: 98.8 (01 Nov 2021 00:00)  HR: 108 (01 Nov 2021 08:45) (90 - 126)  BP: --  BP(mean): 80 (01 Nov 2021 08:00) (80 - 86)  ABP: --  ABP(mean): --  RR: 21 (01 Nov 2021 08:45) (14 - 28)  SpO2: 100% (01 Nov 2021 08:45) (99% - 100%)        I&O's Summary    31 Oct 2021 07:01  -  01 Nov 2021 07:00  --------------------------------------------------------  IN: 1100 mL / OUT: 780 mL / NET: 320 mL    01 Nov 2021 07:01  -  01 Nov 2021 08:51  --------------------------------------------------------  IN: 50 mL / OUT: 0 mL / NET: 50 mL        Physical Exam  General: No distress. Comfortable.  Neck: +trach collar   Chest: Clear to auscultation bilaterally  CV: +VAD hum  Abdomen: Soft, non-distended, non-tender, +keotube, +biliary drain   Neurology: Alert and oriented times three.     LVAD Interrogation  VAD TYPE HM 2  Speed 9200  Flow 5.3  Power 5.6  PI  6.4  Assessment of driveline exit site: driveline dressing c/d/i  Programming changes: no changes made    Labs:                        9.4    8.05  )-----------( 312      ( 01 Nov 2021 00:29 )             28.8     11-01    130<L>  |  96  |  24<H>  ----------------------------<  122<H>  4.3   |  24  |  0.45<L>    Ca    9.5      01 Nov 2021 00:29  Phos  2.9     11-01  Mg     1.8     11-01    TPro  7.9  /  Alb  3.6  /  TBili  0.5  /  DBili  x   /  AST  53<H>  /  ALT  102<H>  /  AlkPhos  212<H>  11-01              Lactate Dehydrogenase, Serum: 288 U/L (11-01 @ 00:29)  Lactate Dehydrogenase, Serum: 273 U/L (10-31 @ 00:43)  Lactate Dehydrogenase, Serum: 311 U/L (10-30 @ 00:48)

## 2021-11-01 NOTE — PROGRESS NOTE ADULT - SUBJECTIVE AND OBJECTIVE BOX
Behavioral Cardiology Progress Note     History of present illness: Mr. Quispe is a 66 year-old man with history of NICM s/p HM2 on 9/2017 as DT (due to severe PAD) with TV ring, prior COVID-19 infection 4/2020, recurrent syncope post LVAD s/p ILR, and chronic abdominal pain with prior negative workup who was admitted with symptomatic anemia with Hgb 4.5 in setting of INR 8.8 without hemodynamic instability. Testing showed active bleeding in the small bowel but subsequent enteroscopy 6/15 did not reveal any active bleeding. He acutely decompensated after procedure with fever/hypertension, low flow alarms, and pulmonary infiltrate with hypoxia requiring intubation from probable aspiration PNA.  Course notable for inability to wean ventilator with persistent secretions for which he underwent tracheostomy as well as acute c diff colitis.     Social history: , lives in his sister’s house in [place]. Has 3 sons (2 live in , 1 in [facility]). One of 3 siblings. Lives in his sister’s house in [place] (Cristina Rudolph [phone]), also in the home are niece ([facility] [phone]) and nephew (Miller Rudolph).  Another sister lives close by in [place] and all other siblings live in [place] which the family visit often.  Worked full time at [employer] in [place], stopped working due to heart disease. Education: high school graduate.     Substance use:   Tobacco: Former smoker, 8-10 cigarettes/day for 30 years.   Alcohol: Past use of 3-4 drinks of Johnson 2x/week. None for past 4 years.   Drug: Denies any drug use.

## 2021-11-01 NOTE — PROGRESS NOTE ADULT - SUBJECTIVE AND OBJECTIVE BOX
RICKY JOINT  MRN#: [id number]  Subjective:  Pulmonary progress  : recurrent Acute hypoxic respiratory Failure ,aspiration pneumonia, NICM  ,   65M PMH ACC/AHA stage D HF due to NICM HM2 LVAD , TV annuloplasty ring 17 as destination therapy due to severe peripheral artery disease with significant stenosis  SIADH, Depression, CKD-3 with hyperkalemia, past E. coli UTIs, driveline drainage (21) and COVID-19 (back in 2020)  He was recently seen in clinic where he complained of abdominal pain and dark stools w constipation back in May. He presents to [facility] ER today weakness and fatigue, moderate and + Black stools for three days, on coumadin secondary to warfarin use in the setting of an LVAD. Patient has required transfusions for GIB in the past. Mostly recently back in 2021 pt had anemia with dark stools. No interventions was done at that time. However Last Endoscopy was done in 2020 (negative). Today labs show patient is anemic with H/H of 4.5/16.3,. INR is 8.84 MAP in the 90s, Temp 35.1. He denies any chest pain, shortness of breath, dizziness, abd pain, nausea or vomiting. found to have  rectal bleeding underwent endoscopy ,old blood in the proximal ileum ,  develop sepsis with LL opacity given Antibiotics , Extubated , reintubated , Bronchoscopy on Zosyn for LL pneumonia  and Amiodarone S/P TV Annuloplasty , patient remain intubated on full ventilatory support .S/P multiple units of blood transfusion , remain on full ventilatory support on Precedex and propofol , new central IJ line , diarrhea C diff. +ve on po vancomycin and IV Flagyl,  mildly distended belly , fever start on cefepime 2gm q 8 hrs S/P tracheostomy .  new RT Subclavian central line continue on contact  isolation ,S/P  C-diff antibiotics, no more diarrhea back on full support mechanical ventilator , chest x ray show improvement in LLL air space disease, more awake and responsive on tube feeding no more diarrhea ,  no nausea or vomiting or diarrhea still very weak and tired , back on tube feeding ,still on po vancomycin , getting PT and OT at bed side ,   , no SOB getting stronger , improve muscle tone patient transfer to monitor bed still on contact isolation for C-Difficel colitis on 50% FI02, and change to Suresh  tube feeding still loose stool . H/H drop significantly require blood transfusion , most likely GI bleeding , IV heparin D/C ,  H/H is stable ., patient develop TR sided  pneumothorax require chest tube placement , RT IJ central line  placed , develop fever shaking chills , blood culture positive for serratia marcescens , start on cefepime .the patient  become hypoxic and hypotensive placed on full ventilatory support and Vasopressin , levophed and Dobutamine ,S/P blood transfusion on meropenem and vancomycin ,   , on and  off pressors , occasional agitation on Precedex .S/P IR cholecystostomy tube drainage placement in the RT upper Quadrant , resume anticoagulation chest x ray noted C-PAP trail lasted only for 2 hrs , new RT SC line and D/C RT IJ line , RT pig tail cathter has been removed , tolerating C-PAP trial placed on trach. collar 50% FI02 GI consultant noted on NG tube feeding as tolerated , develop AF RVR S/P  bolus Amiodarone  back to regular sinus Rhythm , Flat Affect depressed , back on tube feeding Vital AF at 60 cc/ hr .still intermittent abdominal pain , no fever saturation is accepted  back on full ventilatory support ,   on methimazole for hyperthyroidism ,  condition is the same ,still C/O Abdominal pain , white count is improving , no chest pain or SOB ,  .placed on Reglan 10 mg TID for gastric motility , depressed , withdrawn. , S/P  mesenteric angiogram , unable to stent SMA S/P 3 units of blood transfusion   RT IJ in place reassessed for stent in the SMA by vascular,  dark stool stable H/H ,surgical opinion for possible Lap cholecystectomy. over night events noted develop respiratory distress, , rectal bleeding, require intubation placed on full ventilatory support , FI02 is 50% also became hemianosmically unstable require triple pressor support with levophed , vasopressin and norsynephrine, pan culture placed  on Vancomycin IV and PO  and Zosyn, sedated with propofol kept NPO , new central line RT and left IJ cathter placed . Diflucan Added .fever of 38.5 weaned off  vasopressin ,   fever adding IV vancomycin and  vancomycin solution  , and Bactrim , S/P  tracheostomy , bleeding receiving blood transfusion on vasopressin , no more bleeding , no fever , more awake and responsive ,tolerating tube feeding , ID and heart failure follow up appreciated , depresses no weaning for now , magnesium is low to give supplement too keep Above 2 , patient S/P  vascular procedure for superior mesenteric artery occlusion S/P 2 stent placement , patient tolerate it very well  , left upper extremities hematoma .vascular follow up appreciated , increase WBC , new RT UPE midline , black tarry stool , very loose R/O recurrent GI bleeding and C-dificile  colitis stable H/H no events over night , diarrhea is improved ,  WBC is improving  , hyponatremia no change , S/P FEEST study result is pending , PO feeding with observation . on Cipro antibiotics  no major over night events , worsening LFTs for repeat Abdominal sonogram     (2021 16:57)    PAST MEDICAL & SURGICAL HISTORY:  CHF (congestive heart failure)    CAD (coronary artery disease)  Depression    Pleural effusion    History of 2019 novel coronavirus disease (COVID-19)  2020    Hemorrhoids    Bleeding hemorrhoids    Peripheral arterial disease    Claudication    BPH with urinary obstruction    ACC/AHA stage D systolic heart failure  Anticoagulation goal of INR 2.0 to 2.5    Falls    Clavicle fracture    CKD (chronic kidney disease), stage III    Iron deficiency anemia    H/O epistaxis    Vertigo    GI bleed    S/P TVR (tricuspid valve repair)    S/P ventricular assist device    S/P endoscopy    OBJECTIVE:  ICU Vital Signs Last 24 Hrs  T(C): 38.2 (2021 10:00), Max: 38.5 (2021 12:00)  T(F): 100.8 (2021 10:00), Max: 101.3 (2021 12:00)  HR: 65 (2021 10:00) (61 - 69)  BP: --  BP(mean): --  ABP: 105/67 (2021 10:00) (90/54 - 113/64)  ABP(mean): 77 (2021 10:00) (63 - 77)  RR: 20 (2021 10:00) (19 - 35)  SpO2: 99% (2021 10:00) (96% - 100%)       @ 07:01  -   @ 07:00  --------------------------------------------------------  IN: 2693.9 mL / OUT: 1415 mL / NET: 1278.9 mL     @ 07: @ 10:49  --------------------------------------------------------  IN: 420.8 mL / OUT: 115 mL / NET: 305.8 mL  PHYSICAL EXAM: Daily   Elderly male  on trach. collar  FI02 is 40% S/P tracheostomy   Daily Weight in k.4 (2021 00:00)  HEENT:     + NCAT  + EOMI  - Conjuctival edema   - Icterus   - Thrush   - ETT  + NGT/OGT  Neck:         + FROM  + JVD  - Nodes - Masses + Mid-line trachea + Tracheostomy  Chest:            normal A-P diameter    Lungs:          + CTA   + Rhonchi    - Rales    - Wheezing + Decreased  LT BS   - Dullness R L  Cardiac:       + S1 + S2    + RRR   - Irregular   - S3  - S4    - Murmurs   - Rub   - Hamman’s sign   Abdomen:    + BS  + Soft + Non-tender - Distended - Organomegaly - PEG .cholecystostomy tube in place  Extremities:   - Cyanosis U/L   - Clubbing  U/L  + LE/UE Edema LUE hematoma   + Capillary refill    + Pulses   Neuro:        - Awake   -  Alert   - Confused   - Lethargic   + Sedated  + Generalized Weakness  Skin:        - Rashes    - Erythema   + Normal incisions   + IV sites intact          HOSPITAL MEDICATIONS: All medications reviewed and analyzed  MEDICATIONS  (STANDING):  amiodarone    Tablet 200 milliGRAM(s) Oral daily  chlorhexidine 0.12% Liquid 15 milliLiter(s) Oral Mucosa every 12 hours  chlorhexidine 2% Cloths 1 Application(s) Topical <User Schedule>  dexmedetomidine Infusion 0.5 MICROgram(s)/kG/Hr (9.81 mL/Hr) IV Continuous <Continuous>  dextrose 50% Injectable 50 milliLiter(s) IV Push every 15 minutes  heparin  Infusion 400 Unit(s)/Hr (12.5 mL/Hr) IV Continuous <Continuous>  Hydromorphone  Injectable 0.5 milliGRAM(s) IV Push once  insulin lispro (ADMELOG) corrective regimen sliding scale   SubCutaneous every 6 hours  pantoprazole  Injectable 40 milliGRAM(s) IV Push every 12 hours  piperacillin/tazobactam IVPB.. 3.375 Gram(s) IV Intermittent every 8 hours  propofol Infusion 20 MICROgram(s)/kG/Min (9.42 mL/Hr) IV Continuous <Continuous>  sodium chloride 0.9% lock flush 3 milliLiter(s) IV Push every 8 hours  sodium chloride 0.9%. 1000 milliLiter(s) (10 mL/Hr) IV Continuous <Continuous>    MEDICATIONS  (PRN):  acetaminophen    Suspension .. 650 milliGRAM(s) Oral every 6 hours PRN Temp greater or equal to 38C (100.4F)    LABS: All Lab data reviewed and analyzed                        9.4    8.05  )-----------( 312      ( 2021 00:29 )      130<L>  |  96  |  24<H>  ----------------------------<  122<H>  4.3   |  24  |  0.45<L>    Ca    9.5      2021 00:29  Phos  2.9     11-  Mg     1.8         TPro  7.9  /  Alb  3.6  /  TBili  0.5  /  DBili  x   /  AST  53<H>  /  ALT  102<H>  /  AlkPhos  212<H>                 28.8    Ca    9.4      31 Oct 2021 00:43  Phos  2.9     10-31  Mg     1.6     10-31    TPro  7.7  /  Alb  3.5  /  TBili  0.5  /  DBili  x   /  AST  67<H>  /  ALT  118<H>  /  AlkPhos  220<H>  10-31    Ca    9.3      30 Oct 2021 00:48  Phos  2.5     10-30  Mg     1.7     10-    TPro  7.8  /  Alb  3.5  /  TBili  0.6  /  DBili  x   /  AST  87<H>  /  ALT  118<H>  /  AlkPhos  223<H>  10-30               29.3    Ca    9.6      28 Oct 2021 00:38  Phos  2.8     10-  Mg     1.8     10-    TPro  7.2  /  Alb  3.5  /  TBili  0.6  /  DBili  x   /  AST  39  /  ALT  39  /  AlkPhos  192<H>  10-28               26.6   10-27    Ca    9.8      27 Oct 2021 00:24  Phos  2.4     10-27  Mg     1.6     10-27                                                                                                                                                                                      PTT - ( 2021 04:52 )  PTT:45.2 sec LIVER FUNCTIONS - ( 2021 00:42 )  Alb: 3.4 g/dL / Pro: 6.7 g/dL / ALK PHOS: 213 U/L / ALT: 15 U/L / AST: 24 U/L / GGT: x           RADIOLOGY: - Reviewed and analyzed  , LVAD HM2, CT scan of abdomen reviewed result noted

## 2021-11-01 NOTE — CHART NOTE - NSCHARTNOTEFT_GEN_A_CORE
Nutrition Chart Note.   Pt seen for Nutrition follow-up    Chart reviewed, events noted. 66M, PMH ACC/AHA stage D HF 2/2 NICM s/p HM2 LVAD (2017). Here initially for GIB prolonged hospital course, s/p tracheostomy, acalculous cholecystitis s/p perc dawn. Patient c persistent intermittent abdominal pain - found with SMA stenosis on mesenteric angiogram on 9/10, however, unable to place stent. CTA Abd/Pelvis () reveals L RP hematoma. Was tolerating TC and decannulated , transferred to [place] , had multiple episodes of vomiting and went into acute respiratory distress to which he was subsequently intubated. CT Scan  showed multifocal PNA; recultured on  and positive enterobacter cloacae - on abx. S/p trach placement 10/4. S/p 10/18 mesenteric angiogram with x2 stents, with hematoma at brachial cutdown site. now made DNR.     Source: EMR, Team    Diet, Pureed:   Mildly Thick Liquids (MILDTHICKLIQS)  Tube Feeding Modality: Nasogastric  Vital 1.5 Tank (VITAL1.5RTH)  Total Volume for 24 Hours (mL): 1200  Continuous  Starting Tube Feed Rate {mL per Hour}: 50  Until Goal Tube Feed Rate (mL per Hour): 50  Tube Feed Duration (in Hours): 24  Tube Feed Start Time: 15:40 (10-29-21 @ 15:40)    EN order provides: 1200ml formula, 1800kcals, 81g protein, 917ml free H2O  - Consuming purred foods - comfort    Current Pump Rate: 50mL/hr  EN provisions per chart:     (10/31) 83% EN goal volume achieved     (10/30) 100% EN goal volume achieved     (10/29) 100% EN goal volume achieved     (10/28) 73% EN goal volume achieved    Nutrition-Related Events:   - Remeron ordered  - Persistent issues with EN tolerance; has continued to refuse PEG  - Insulin Sliding Scale ordered to optimize BG; noted currently receiving steroid  - Continues with Multivitamin & thiamin supplementation ordered daily    GI:  Last BM  - noted with some liquid stools.   Bowel Regimen? [] Yes   [x] No  - Last emesis noted 10/23  - Noted on abx course at this time  - Reglan ordered as well as Zofran (PRN)    Weight in k (10-), 50.8 (10-), 51.2 (10-), 52.6 (10-), 54.9 (10-), 54.7 (10-), 53.1 (10-), 59.7 (10-20), 55.1 (10-19), 55.3 (10-18), 54.6 (10-17), 55.9 (10-16), 56.4 (10-15), 57.3 (10-13), 59.8 (10-04), 62.7 (10-02), 62.1 (), 65.7 (09-15), 58.4 (), 66.2 (), 71.2 ()    Weight history:   - Admission weight: 176.3 pounds / 80 kg (6/15)    MEDICATIONS  (STANDING):  ciprofloxacin     Tablet  insulin lispro (ADMELOG) corrective regimen sliding scale  methimazole  metoprolol tartrate  multivitamin  pantoprazole  Injectable  predniSONE   Tablet  thiamine  urea Oral Powder  vancomycin    Solution    Pertinent Labs:  @ 00:29: Na 130<L>, BUN 24<H>, Cr 0.45<L>, <H>, K+ 4.3, Phos 2.9, Mg 1.8, Alk Phos 212<H>, ALT/SGPT 102<H>, AST/SGOT 53<H>, HbA1c --    A1C with Estimated Average Glucose Result: 5.4 % (08-15-21 @ 13:52)  A1C with Estimated Average Glucose Result: 5.6 % (06-15-21 @ 02:42)    Finger Sticks:  POCT Blood Glucose.: 124 mg/dL ( @ 05:32)  POCT Blood Glucose.: 122 mg/dL ( @ 00:03)  POCT Blood Glucose.: 126 mg/dL (10-31 @ 17:06)  POCT Blood Glucose.: 122 mg/dL (10-31 @ 12:41)    Skin per nursing flowsheets: tracheal wound; no pressure injuries noted  Edema: none noted    Estimated Nutrition Needs:   Recalculated based on most recent weight 53.1kg (10/21)  Energy Needs (33-38 kcals/kg): 5738-4059  Protein Needs (1.6-2.0 g/kg):     Previous Nutrition Diagnosis: Severe chronic malnutrition  Nutrition diagnosis is ongoing & addressed with tube feeds as tolerated    No new nutrition diagnosis at this time    Recommendations:      Monitoring and Evaluation:   Continue to monitor Nutritional intake, Tolerance to diet prescription, weights, labs, skin integrity  RD remains available upon request and will follow up per protocol     Wendy Travis, MS, RD, CDN, [facility]  pager #[phone] Nutrition Chart Note.   Pt seen for Nutrition follow-up    Chart reviewed, events noted. 66M, PMH ACC/AHA stage D HF 2/2 NICM s/p HM2 LVAD (2017). Here initially for GIB prolonged hospital course, s/p tracheostomy, acalculous cholecystitis s/p perc dawn. Patient c persistent intermittent abdominal pain - found with SMA stenosis on mesenteric angiogram on 9/10. S/p trach placement 10/4. S/p mesenteric angiogram with x2 stents (10/18). now made DNR. Most recently has been receiving supplemental PO feeds after seen by SLP; currently tolerating with EN infusions.    Source: EMR, Team    Diet, Pureed:   Mildly Thick Liquids (MILDTHICKLIQS)  Tube Feeding Modality: Nasogastric  Vital 1.5 Tank (VITAL1.5RTH)  Total Volume for 24 Hours (mL): 1200  Continuous  Starting Tube Feed Rate {mL per Hour}: 50  Until Goal Tube Feed Rate (mL per Hour): 50  Tube Feed Duration (in Hours): 24  Tube Feed Start Time: 15:40 (10-29-21 @ 15:40)    EN order provides: 1200ml formula, 1800kcals, 81g protein, 917ml free H2O  - Consuming purred foods; per Team his N+V has improved and is tolerating PO diet + EN    Current Pump Rate: 50mL/hr  EN provisions per chart:     (10/31) 83% EN goal volume achieved     (10/30) 100% EN goal volume achieved     (10/29) 100% EN goal volume achieved     (10/28) 73% EN goal volume achieved    Nutrition-Related Events:   - FEES completed 10/29 with SLP recommendations for "Pureed texture diet with Mildly Thick Liquids, SMALL SINGLE SIPS, NO STRAWS"  - Remeron ordered  - Persistent issues with EN tolerance; has continued to refuse PEG  - Insulin Sliding Scale ordered to optimize BG; noted currently receiving steroid  - Continues with Multivitamin & thiamin supplementation ordered daily    GI:  Last BM  - noted with some liquid stools.   Bowel Regimen? [] Yes   [x] No  - Last emesis noted 10/23  - Noted on abx course at this time  - Reglan ordered as well as Zofran (PRN)    Weight in k (10-31), 50.8 (10-), 51.2 (10), 52.6 (10-), 54.9 (10-27), 54.7 (10-), 53.1 (10-), 59.7 (10-20), 55.1 (10-19), 55.3 (10-18), 54.6 (10-17), 55.9 (10-16), 56.4 (10-15), 57.3 (10-13), 59.8 (10-04), 62.7 (10-02), 62.1 (), 65.7 (09-15), 58.4 (), 66.2 (), 71.2 ()    Weight history:   - Admission weight: 176.3 pounds / 80 kg (6/15)    MEDICATIONS  (STANDING):  ciprofloxacin     Tablet  insulin lispro (ADMELOG) corrective regimen sliding scale  methimazole  metoprolol tartrate  multivitamin  pantoprazole  Injectable  predniSONE   Tablet  thiamine  urea Oral Powder  vancomycin    Solution    Pertinent Labs:  @ 00:29: Na 130<L>, BUN 24<H>, Cr 0.45<L>, <H>, K+ 4.3, Phos 2.9, Mg 1.8, Alk Phos 212<H>, ALT/SGPT 102<H>, AST/SGOT 53<H>, HbA1c --    A1C with Estimated Average Glucose Result: 5.4 % (08-15-21 @ 13:52)  A1C with Estimated Average Glucose Result: 5.6 % (06-15-21 @ 02:42)    Finger Sticks:  POCT Blood Glucose.: 124 mg/dL ( @ 05:32)  POCT Blood Glucose.: 122 mg/dL ( @ 00:03)  POCT Blood Glucose.: 126 mg/dL (10-31 @ 17:06)  POCT Blood Glucose.: 122 mg/dL (10-31 @ 12:41)    Skin per nursing flowsheets: tracheal wound; no pressure injuries noted  Edema: none noted    Estimated Nutrition Needs:   Recalculated based on recent weight 53.1kg (10/21)  Energy Needs (33-38 kcals/kg): 3756-2188  Protein Needs (1.6-2.0 g/kg):     Previous Nutrition Diagnosis: Severe chronic malnutrition  Nutrition diagnosis is ongoing & addressed with tube feeds as tolerated    No new nutrition diagnosis at this time    Recommendations:    1. Continue current EN regimen of Vital 1.5 @ goal rate of 50ml/hr x 24hrs. Provides 1200ml formula, 1800kcals, 81g protein, 917ml free H2O (meets 34kcals/kg & 1.5g protein/kg based on 10/21 wt 53.1kg).  - May consider initiating calorie count to assess adequacy of PO diet  2. Continue micronutrient supplementation as ordered.  3. RD to remain available to adjust EN formulary, volume/rate PRN.     Monitoring and Evaluation:   Continue to monitor Nutritional intake, Tolerance to diet prescription, weights, labs, skin integrity  RD remains available upon request and will follow up per protocol     Wendy Travis, MS, RD, CDN, [facility]  pager #[phone] Nutrition Chart Note.   Pt seen for Nutrition follow-up    Chart reviewed, events noted. 66M, PMH ACC/AHA stage D HF 2/2 NICM s/p HM2 LVAD (2017). Here initially for GIB prolonged hospital course, s/p tracheostomy, acalculous cholecystitis s/p perc dawn. Patient c persistent intermittent abdominal pain - found with SMA stenosis on mesenteric angiogram on 9/10. S/p trach placement 10/4. S/p mesenteric angiogram with x2 stents (10/18). now made DNR. Most recently has been receiving supplemental PO feeds after seen by SLP; currently tolerating with EN infusions.    Source: EMR, Team    Diet, Pureed:   Mildly Thick Liquids (MILDTHICKLIQS)  Tube Feeding Modality: Nasogastric  Vital 1.5 Tank (VITAL1.5RTH)  Total Volume for 24 Hours (mL): 1200  Continuous  Starting Tube Feed Rate {mL per Hour}: 50  Until Goal Tube Feed Rate (mL per Hour): 50  Tube Feed Duration (in Hours): 24  Tube Feed Start Time: 15:40 (10-29-21 @ 15:40)    EN order provides: 1200ml formula, 1800kcals, 81g protein, 917ml free H2O  - Consuming purred foods; per Team his N+V has improved and is tolerating PO diet + EN    Current Pump Rate: 50mL/hr  EN provisions per chart:     (10/31) 83% EN goal volume achieved     (10/30) 100% EN goal volume achieved     (10/29) 100% EN goal volume achieved     (10/28) 73% EN goal volume achieved    Nutrition-Related Events:   - FEES completed 10/29 with SLP recommendations for "Pureed texture diet with Mildly Thick Liquids, SMALL SINGLE SIPS, NO STRAWS"  - Remeron ordered  - Persistent issues with EN tolerance; has continued to refuse PEG  - Insulin Sliding Scale ordered to optimize BG; noted currently receiving steroid  - Continues with Multivitamin & thiamin supplementation ordered daily    GI:  Last BM  - noted with some liquid stools.   Bowel Regimen? [] Yes   [x] No  - Last emesis noted 10/23  - Noted on abx course at this time  - Reglan ordered as well as Zofran (PRN)    Weight in k (10-31), 50.8 (10-), 51.2 (10), 52.6 (10-), 54.9 (10-27), 54.7 (10-), 53.1 (10-), 59.7 (10-20), 55.1 (10-19), 55.3 (10-18), 54.6 (10-17), 55.9 (10-16), 56.4 (10-15), 57.3 (10-13), 59.8 (10-04), 62.7 (10-02), 62.1 (), 65.7 (09-15), 58.4 (), 66.2 (), 71.2 ()    Weight history:   - Admission weight: 176.3 pounds / 80 kg (6/15)    MEDICATIONS  (STANDING):  ciprofloxacin     Tablet  insulin lispro (ADMELOG) corrective regimen sliding scale  methimazole  metoprolol tartrate  multivitamin  pantoprazole  Injectable  predniSONE   Tablet  thiamine  urea Oral Powder  vancomycin    Solution    Pertinent Labs:  @ 00:29: Na 130<L>, BUN 24<H>, Cr 0.45<L>, <H>, K+ 4.3, Phos 2.9, Mg 1.8, Alk Phos 212<H>, ALT/SGPT 102<H>, AST/SGOT 53<H>, HbA1c --    A1C with Estimated Average Glucose Result: 5.4 % (08-15-21 @ 13:52)  A1C with Estimated Average Glucose Result: 5.6 % (06-15-21 @ 02:42)    Finger Sticks:  POCT Blood Glucose.: 124 mg/dL ( @ 05:32)  POCT Blood Glucose.: 122 mg/dL ( @ 00:03)  POCT Blood Glucose.: 126 mg/dL (10-31 @ 17:06)  POCT Blood Glucose.: 122 mg/dL (10-31 @ 12:41)    Skin per nursing flowsheets: tracheal wound; no pressure injuries noted  Edema: none noted    Estimated Nutrition Needs:   Recalculated based on recent weight 53.1kg (10/21)  Energy Needs (33-38 kcals/kg): 5265-7163  Protein Needs (1.6-2.0 g/kg):     Previous Nutrition Diagnosis: Severe chronic malnutrition  Nutrition diagnosis is ongoing & addressed with tube feeds as tolerated    No new nutrition diagnosis at this time    Recommendations:    1. Continue current EN regimen of Vital 1.5 @ goal rate of 50ml/hr x 24hrs. Provides 1200ml formula, 1800kcals, 81g protein, 917ml free H2O (meets 34kcals/kg & 1.5g protein/kg based on 10/21 wt 53.1kg).  - Continue supplemental PO diet without therapeutic restrictions - defer textures/consistencies to SLP/Team  2. May consider initiating calorie count to assess adequacy of PO diet  3. Continue micronutrient supplementation as ordered.  4. RD to remain available to adjust EN formulary, volume/rate PRN.     Monitoring and Evaluation:   Continue to monitor Nutritional intake, Tolerance to diet prescription, weights, labs, skin integrity  RD remains available upon request and will follow up per protocol     Wendy Travis, MS, RD, CDN, CNSC  pager #[phone]

## 2021-11-01 NOTE — PROGRESS NOTE ADULT - SUBJECTIVE AND OBJECTIVE BOX
Chief Complaint/Follow-up on:     Subjective:    MEDICATIONS  (STANDING):  chlorhexidine 2% Cloths 1 Application(s) Topical <User Schedule>  ciprofloxacin     Tablet 500 milliGRAM(s) Oral every 12 hours  insulin lispro (ADMELOG) corrective regimen sliding scale   SubCutaneous every 6 hours  magnesium oxide 400 milliGRAM(s) Oral every 12 hours  methimazole 15 milliGRAM(s) Oral daily  metoclopramide Injectable 10 milliGRAM(s) IV Push every 8 hours  metoprolol tartrate 12.5 milliGRAM(s) Oral every 12 hours  mirtazapine 7.5 milliGRAM(s) Oral at bedtime  multivitamin 1 Tablet(s) Oral daily  ondansetron Injectable 8 milliGRAM(s) IV Push every 8 hours  pantoprazole  Injectable 40 milliGRAM(s) IV Push every 12 hours  predniSONE   Tablet 2 milliGRAM(s) Oral daily  sertraline 100 milliGRAM(s) Oral daily  thiamine 100 milliGRAM(s) Oral daily  urea Oral Powder 15 Gram(s) Oral daily  vancomycin    Solution 125 milliGRAM(s) Oral every 12 hours    MEDICATIONS  (PRN):  fentaNYL    Injectable 12 MICROGram(s) IV Push every 3 hours PRN Moderate to severe pain  guaiFENesin Oral Liquid (Sugar-Free) 200 milliGRAM(s) Oral every 4 hours PRN mucous plugging  simethicone 80 milliGRAM(s) Chew every 8 hours PRN Gas      PHYSICAL EXAM:  VITALS: T(C): 36.7 (11-01-21 @ 12:00)  T(F): 98 (11-01-21 @ 12:00), Max: 98.8 (11-01-21 @ 00:00)  HR: 116 (11-01-21 @ 12:00) (93 - 126)  BP: --  RR:  (14 - 28)  SpO2:  (99% - 100%)  Wt(kg): --  GENERAL: NAD, well-groomed, well-developed  EYES: No proptosis, no injection  HEENT:  Atraumatic, Normocephalic, moist mucous membranes  THYROID: Normal size, no palpable nodules  RESPIRATORY: Clear to auscultation bilaterally; No rales, rhonchi, wheezing, or rubs  CARDIOVASCULAR: Regular rate and rhythm; No murmurs; no peripheral edema  GI: Soft, nontender, non distended, normal bowel sounds  CUSHING'S SIGNS: no striae    POCT Blood Glucose.: 155 mg/dL (11-01-21 @ 11:20)  POCT Blood Glucose.: 124 mg/dL (11-01-21 @ 05:32)  POCT Blood Glucose.: 122 mg/dL (11-01-21 @ 00:03)  POCT Blood Glucose.: 126 mg/dL (10-31-21 @ 17:06)  POCT Blood Glucose.: 122 mg/dL (10-31-21 @ 12:41)  POCT Blood Glucose.: 131 mg/dL (10-31-21 @ 06:19)  POCT Blood Glucose.: 110 mg/dL (10-31-21 @ 00:29)  POCT Blood Glucose.: 143 mg/dL (10-30-21 @ 18:21)  POCT Blood Glucose.: 132 mg/dL (10-30-21 @ 12:13)  POCT Blood Glucose.: 122 mg/dL (10-30-21 @ 05:23)  POCT Blood Glucose.: 115 mg/dL (10-30-21 @ 00:24)  POCT Blood Glucose.: 115 mg/dL (10-29-21 @ 17:30)    11-01    130<L>  |  96  |  24<H>  ----------------------------<  122<H>  4.3   |  24  |  0.45<L>    EGFR if : 138  EGFR if non : 119    Ca    9.5      11-01  Mg     1.8     11-01  Phos  2.9     11-01    TPro  7.9  /  Alb  3.6  /  TBili  0.5  /  DBili  x   /  AST  53<H>  /  ALT  102<H>  /  AlkPhos  212<H>  11-01          Thyroid Function Tests:  10-25 @ 03:20 TSH -- FreeT4 1.2 T3 -- Anti TPO -- Anti Thyroglobulin Ab -- TSI --  10-18 @ 13:02 TSH -- FreeT4 2.5 T3 67 Anti TPO -- Anti Thyroglobulin Ab -- TSI --

## 2021-11-01 NOTE — PROGRESS NOTE ADULT - PROBLEM SELECTOR PLAN 1
- ACC/AHA stage D systolic heart failure s/p LVAD   - Currently on Lopressor 12.5 mg PO BID, (MAP goal 70-80) - ACC/AHA stage D systolic heart failure s/p LVAD   - Currently on Lopressor 12.5 mg PO BID, (MAP goal 70-80). if able to tolerate will be transition to Toprol XL 25 mg PO tomorrow

## 2021-11-01 NOTE — PROGRESS NOTE ADULT - ATTENDING COMMENTS
66/M with Stage D HF s/p HM 2 as DT (Severe PAD), abdominal pain 2/2 mysentric ischemia, admitted on 6/2021 for anemia with Hb 4.5 with supratheraputic INR 8.8, with hospital course complicated by resp failure s/p Trach ( retrach on 10/4) weaned off vent, Entero bacter bactremia, serratia/ stenotrophomonas Pneumonia, recurrent GI bleed, s/p SMA stent for abdominal pain, now DNR and Doesn't want to be back on vent in the setting of clinical worsening.     LVAD interrogation: HM 2, Speed 9200, FLow 5's PI: 4-5 and Power 5's, Flows down to 3.3lit w/o speed drop.     Plan:  Stage D HF/ HM 2  MAP's in goal, off any vasoactive drips  Off all AC due to recurrent Bleeding  LDH stable     GI bleeding  off AC as above  Hb stable  COnt monitor    Resp failure:  On trach collar,  cont wean as tolerated and possible decannulation    Sepsis, Enterobacter bactremia, Serratia/ stenotrophomonas PNA  cont cipro  repeat cultures    Cholecystitis - achalculous, s/p perc drain placement  f/o gen surg for tube confirmation study indication    Hyperthyroidism  cont methimazole and steroid janeth    hyponatremia  nehro recs appreciated  improved   monitor off urea    Diet:  cont tube feed at goal  passed FEES and started on Puree diet  oral feeds only with supervision  aspiration precaution    OOB to chair, PT/OT  transfer to Step down

## 2021-11-01 NOTE — PROGRESS NOTE ADULT - PROBLEM SELECTOR PLAN 2
- Stable w/o evidence of LVAD malfunction, settings as above  - H/o recurrent GI bleeding  - LDH slowly uptrending, continue to trend   - now DNR - Stable w/o evidence of LVAD malfunction, settings as above  - H/o recurrent GI bleeding  - LDH elevated but stable   - now DNR

## 2021-11-01 NOTE — PROGRESS NOTE ADULT - ASSESSMENT
Assessment and Recommendation:   · Assessment	  Assessment and recommendation :  Recurrent Acute hypoxic respiratory Failure  FI02 is 40% S/P tracheostomy  on track. collar   Acute blood loss anemia S/P multiple  blood transfusion post tracheostomy   recurrent  septic shock  weaned off  vasopressin   sepsis   on Cipro tablets   po vancomycin   C-Dificle colitis on po vancomycin   S/P cholecystostomy tube placement by IR    to repeat Abdominal ultra sound  patient is S/P  repeat vacular procedure and SMA stent x2 complicated with LUE hematoma   AF RVR back to regular sinus Rhythm   Non ischemic cardiomyopathy continue ACE inhibitor and B-Blockers   hyponatremia fluid restriction   S/P 3 unit of blood transfusion   Stage D systolic heart failure S/P LVAD HM2   MH2 LVAD  with  TV Annuloplasty  Severe peripheral vascular disease   severe hyperglycemia on insulin coverage    Reglan 10 mg for Gastric Motility   hyperthyroidism on Methimazole 10mg TID   critical care polyneuropathy   Anemia of Acute blood Loss   severe protein caloric malnutrition   magnesium supplement keep above 2   Chronic kidney disease stage III  Depressed and withdrawn   on  NG tube feeding   GI prophylaxis with PPI   Discussed with TCV team

## 2021-11-01 NOTE — PROGRESS NOTE ADULT - SUBJECTIVE AND OBJECTIVE BOX
RICKY HAMPTON  MRN-[id number]  Patient is a 66y old  Male who presents with a chief complaint of Anemia, Supratherapeutic INR, Dark Stools (2021 10:32)    HPI:  65M PMH ACC/AHA stage D HF due to NICM HM2 LVAD , TV annuloplasty ring 17 as destination therapy due to severe peripheral artery disease with significant stenosis  SIADH, Depression, CKD-3 with hyperkalemia, past E. coli UTIs, driveline drainage (21) and COVID-19 (back in 2020)  He was recently seen in clinic where he complained of abdominal pain and dark stools w constipation back in May. He presents to [facility] ER today weakness and fatigue, moderate and + Black stools for three days, on coumadin secondary to warfarin use in the setting of an LVAD. Patient has required transfusions for GIB in the past. Mostly recently back in 2021 pt had anemia with dark stools. No interventions was done at that time. However Last Endoscopy was done in 2020 (negative). Today labs show patient is anemic with H/H of 4.5/16.3,. INR is 8.84 MAP in the 90s, Temp 35.1. He denies any chest pain, shortness of breath, dizziness, abd pain, nausea or vomiting.       (2021 16:57)      Surgery/Hospital Course:   admit for melena w/ anemia, INR 8.84   6/15 Capsul study (+) for small bowel bleed, balloon endoscopy (old blood in prox ileum); post EGD - septic w/ L opacity, re-intubated for concern for aspiration, TTE (Mod MR, decrease biV w/ interventricular septum boweing towards R)   bronch    +C Diff    CT C/A/P: Fluid filled colon which may be 2/2 rapid transit. Small bilateral pleffs with associates. Compressive atelectasis New ISABELLE & LLL  parenchymal opacities, suspicious for pneumonia. Moderate stenosis in the proximal superior mesenteric artery.    #8 Shiley trach at bedside    LVAD speed increased to 9200   Bronch   TC since . Patient transferred to SDU.    INR today 2.64.  H&H 7.3/24 this AM.  Will repeat CBC at noon, and will send stool guaiac Patient with persistent abdominal tenderness, rate of tube feeds decreased.  No nausea/vomiting.     INR today 2.4. H&H 9.1/28.6 low flow overnight /N&V, refusing Tube feeds on D5 1/2 normal  @50 cc/hr   INR 2.69  H&H 7.7/.1 refusing Tube feeds on D5 1/2 normal  @50 cc/hr. This am + BM Melena Dr Oneill HF  aware- PRBC x1  GI team consulted -  NPO  plan on study in am-  D/w Dr Cadet Patient  to return to CTU for further management; 1PRBCS    Post op INR 2.2 today.  No bleeding. BC + for SM.  Pt is hypotensive requiring pressor and inotropic support.  ID follow up today on Cefepime will follow.   R PTC for PTX    CT C/A/P: sub q emphysema in R chest wall, GGO RUL, small ascites CTH negative; Abd US: GB thickening, pericholecystic fluid     Perchole drain in place continues to drain total output overnight 133.  Fever today 38.8    duplex LE negative    Patient with persistent abdominal pain, refusing tube feeds and medications, Psych consulted   CTA A/P ordered to r/o mesenteric ischemia 2/2 persistent anorexia, nausea, vomiting. Revealed:  Evaluation of the mesenteric vessels is limited by streak artifact from LVAD. There appears to be severe stenosis of the proximal SMA; abdominal mesenteric doppler is recommended for further evaluation. 2.  No small bowel findings to suggest acute mesenteric ischemia. 3.  Focal dissections involving the right and left common iliac arteries.  8/15: Cultures resulted BC 1/2 +GPC in clusters, SC enterobacter; mesenteric duplex: borderline stenosis of proximal SMA  : CT C/A/P noncon: Nondular opacities in R lung apex w/cavitation, abd nl  :  Continue current care, treatment of thyrotoxicosis with medications as per endocrine, d/c ABX as per team.    RUQ sono: Contracted gallbladder with cholecystostomy tube in place.  9/10 failed SMA stent, c/b RP bleed s/p 3U PRCB   CTA Abd/Pelvis L RP hematoma    Trach decannulated    intubated fro resp distress for increased WOB, LL pneumonia; CT C/A/P: progressive ISABELLE opacities and L consolidations, multifocal pneumonia    TTE (EF 25%, decreased RV, mod MR)   10/3 VT -> Lidocaine gtt   10/4 Trach size 6 DCT cuff  10/5 CT abd (neg for intra-abd abcess, severe stenosis of prox SMA and b/l iliac arteries)  10/18 SMA Stent   10/23 Emend for nausea   10/24 +Occult    Today:  - tolerating PO pureed diet along w/ tube feeds, continue to advance diet as tolerated  - Will send culture today to eval for worsening leukocytosis   REVIEW OF SYSTEMS:  Speak over trach   Gen: No fever  EYES/ENT: No visual changes;  No vertigo or throat pain   NECK: No pain   RES:  Some SOB briefly, otherwise no Cough  CARD: No chest pain   GI: +intermittently c/o abd pain and nausea   : No dysuria  NEURO: No weakness  SKIN: No itching, rashes       ICU Vital Signs Last 24 Hrs  T(C): 36.8 (2021 08:00), Max: 37.1 (2021 00:00)  T(F): 98.2 (2021 08:00), Max: 98.8 (2021 00:00)  HR: 108 (2021 08:45) (93 - 126)  BP: --  BP(mean): 80 (2021 08:00) (80 - 86)  ABP: --  ABP(mean): --  RR: 21 (2021 08:45) (14 - 28)  SpO2: 100% (2021 08:45) (99% - 100%)      Physical Exam:  Gen:  NAD  CNS: intermittently agitated but responds to commands  Neck: no JVD, +trach, +central line   RES : coarse breath sounds, no wheezing    CVS: +LVAD hum   Abd: Soft. Sybil drain with bilious fluid. Positive BS throughout. Mild RUQ abdominal tenderness by perc sybil.  Skin: No rash, erythema, cyanosis.  Vasc: Warm and well-perfused.  Ext:  no edema      ============================I/O===========================   I&O's Detail    31 Oct 2021 07:01  -  2021 07:00  --------------------------------------------------------  IN:    Miscellaneous Tube Feedin mL    sodium chloride 0.9%: 100 mL  Total IN: 1100 mL    OUT:    Drain (mL): 300 mL    Voided (mL): 480 mL  Total OUT: 780 mL    Total NET: 320 mL      2021 07:01  -  2021 10:50  --------------------------------------------------------  IN:    Miscellaneous Tube Feedin mL  Total IN: 200 mL    OUT:    Drain (mL): 0 mL  Total OUT: 0 mL    Total NET: 200 mL        ============================ LABS =========================                        9.4    8.05  )-----------( 312      ( 2021 00:29 )             28.8         130<L>  |  96  |  24<H>  ----------------------------<  122<H>  4.3   |  24  |  0.45<L>    Ca    9.5      2021 00:29  Phos  2.9       Mg     1.8         TPro  7.9  /  Alb  3.6  /  TBili  0.5  /  DBili  x   /  AST  53<H>  /  ALT  102<H>  /  AlkPhos  212<H>      LIVER FUNCTIONS - ( 2021 00:29 )  Alb: 3.6 g/dL / Pro: 7.9 g/dL / ALK PHOS: 212 U/L / ALT: 102 U/L / AST: 53 U/L / GGT: x                 ======================Micro/Rad/Cardio=================  Culture: Reviewed   CXR: Reviewed  Echo:Reviewed  ======================================================  PAST MEDICAL & SURGICAL HISTORY:  CHF (congestive heart failure)    CAD (coronary artery disease)    Depression    Pleural effusion    History of  novel coronavirus disease (COVID-19)  2020    Hemorrhoids    Bleeding hemorrhoids    Peripheral arterial disease    Claudication    BPH with urinary obstruction    ACC/AHA stage D systolic heart failure    Anticoagulation goal of INR 2.0 to 2.5    Falls    Clavicle fracture    CKD (chronic kidney disease), stage III    Iron deficiency anemia    H/O epistaxis    Vertigo    GI bleed    S/P TVR (tricuspid valve repair)    S/P ventricular assist device    S/P endoscopy      ====================ASSESSMENT ==============  Stage D Nonischemic Cardiomyopathy, Status Post HM2 on 2017    Cardiogenic shock  Hemodynamic instability   Acute hypoxemic respiratory failure s/p trach , decannulated on ; Reintubated on    GI bleed , Status Post Enteroscopy   Anemia, in setting of melena   Chronic Kidney Disease  Stress hyperglycemia   C.diff positive on    Hypovolemic shock  Septic shock  Leukocytosis  GB thickening/percholecystic s/p perc sybil by IR   SMA stenosis s/p SMA Stent on 10/18   Serratia/citrobacter pneumonia   Stenotrophomonas pneumonia   Enterobacter pneumonia   Nasuea/vomiting  Deconditioning  Hyponatremia  DNR / No vent    Plan:  ====================== NEUROLOGY=====================  Intermittently agitated  OOB, ambulate as tolerated  Continue close monitoring of neuro status   C/w sertraline, mirtazapine for depression   PRN Fentanyl for pain     fentaNYL    Injectable 12 MICROGram(s) IV Push every 3 hours PRN Moderate to severe pain  mirtazapine 7.5 milliGRAM(s) Oral at bedtime  sertraline 100 milliGRAM(s) Oral daily  ==================== RESPIRATORY======================  Acute hypoxemic respiratory failure s/p #8 shiley trach on ; decannulated on ; Reintubated on ; trach size 6 DCT cuff on 10/4   Continue close monitoring of respiratory rate and breathing pattern, following of ABG’s with [place] monitoring, continuous pulse oximetry monitoring.   Tolerating TC since 10/19, continue TC as tolerated   Guaifenesin PO PRN mucous plugging   DNR/ no vent    Mechanical Ventilation:    guaiFENesin Oral Liquid (Sugar-Free) 200 milliGRAM(s) Oral every 4 hours PRN mucous plugging    ====================CARDIOVASCULAR==================  Stage D Nonischemic Cardiomyopathy, Status Post HM2 on 2017; LVAD settings 9200, flow 5.0  TTE 10/4:  Severely decreased LV systolic function, Diffuse hypokinesis. Mild AR. No pericardial effusion   VT resolved, continue rate control w/ Lopressor   ASA/Plavix for recent SMA stent on hold due to drop in h/h and +occult feces on 10/24    metoprolol tartrate 12.5 milliGRAM(s) Oral every 12 hours  urea Oral Powder 15 Gram(s) Oral daily  ===================HEMATOLOGIC/ONC ===================  CTA A/P on  +L RP hematoma;   Acute blood loss anemia, monitor H&H/Plts   LUE US on 10/20 with no pseudoaneurysm  ASA/Plavix for recent SMA stent on hold for recent drop in h/h in setting of (+) guaiac (on 10/24)   ===================== RENAL =========================  Hx of CKD, continue monitoring urine output, I&OS, BUN/Cr   Replete lytes PRN. Keep K> 4 and Mg >2  Hyponatremia  ==================== GASTROINTESTINAL===================  CT A/P 10/5 neg for intra-abd abcess, severe stenosis of prox SMA and b/l iliac arteries  CTA A/P : Evaluation of the mesenteric vessels is limited by streak artifact from LVAD. There appears to be severe stenosis of the proximal SMA; abdominal mesenteric doppler is recommended for further evaluation. 2.  No small bowel findings to suggest acute mesenteric ischemia. 3.  Focal dissections involving the right and left common iliac arteries.  Stenosis of proximal SMA, s/p failed SMA stent c/b RP bleed on 9/10 - SMA stent with Vascular 10/18   Simethicone PRN for gas   Reglan for gut motility  Zofran for nausea   +occult feces on 10/24   RUQ U/S findings of pericholecystic fluid despite perc sybil drain in place as well as ascites, will follow-up with GI regarding potential contrast study  Tolerating tube feeds, Vital @ 50cc/hr. Passed FEES 10/29, now able to supplement NGT feedings with PO pureed diet with mildly thickened liquids when sitting upright OOBTC  Continue Protonix for stress ulcer prophylaxis.     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   BCx 10/14 and SCX on 10/14 +Stenotrophomonas maltophilia s/p Bactrim   Empiric coverage with Ciprofloxacin for chronic suppression, PO Vanco for C. Diff ppx  Will discuss with Gen Sx RUQ U/S findings of pericholecystic fluid despite perc sybil drain in place as well as ascites    ciprofloxacin     Tablet 500 milliGRAM(s) Oral every 12 hours  vancomycin    Solution 125 milliGRAM(s) Oral every 12 hours      Patient requires continuous monitoring with bedside rhythm monitoring, pulse ox monitoring, and intermittent blood gas analysis. Care plan discussed with ICU care team. Patient remained critical and at risk for life threatening decompensation.     By signing my name below, I, Emily Roa, attest that this documentation has been prepared under the direction and in the presence of CLAUDIA Gale   Electronically signed: Emily Roa Scribe, 21 @ 10:50    I, CLAUDIA Gale  , personally performed the services described in this documentation. all medical record entries made by the romel were at my direction and in my presence. I have reviewed the chart and agree that the record reflects my personal performance and is accurate and complete  Electronically signed: CLAUDIA Gale        RICKY HAMPTON  MRN-[id number]  Patient is a 66y old  Male who presents with a chief complaint of Anemia, Supratherapeutic INR, Dark Stools (2021 10:32)    HPI:  65M PMH ACC/AHA stage D HF due to NICM HM2 LVAD , TV annuloplasty ring 17 as destination therapy due to severe peripheral artery disease with significant stenosis  SIADH, Depression, CKD-3 with hyperkalemia, past E. coli UTIs, driveline drainage (21) and COVID-19 (back in 2020)  He was recently seen in clinic where he complained of abdominal pain and dark stools w constipation back in May. He presents to [facility] ER today weakness and fatigue, moderate and + Black stools for three days, on coumadin secondary to warfarin use in the setting of an LVAD. Patient has required transfusions for GIB in the past. Mostly recently back in 2021 pt had anemia with dark stools. No interventions was done at that time. However Last Endoscopy was done in 2020 (negative). Today labs show patient is anemic with H/H of 4.5/16.3,. INR is 8.84 MAP in the 90s, Temp 35.1. He denies any chest pain, shortness of breath, dizziness, abd pain, nausea or vomiting.       (2021 16:57)      Surgery/Hospital Course:   admit for melena w/ anemia, INR 8.84   6/15 Capsul study (+) for small bowel bleed, balloon endoscopy (old blood in prox ileum); post EGD - septic w/ L opacity, re-intubated for concern for aspiration, TTE (Mod MR, decrease biV w/ interventricular septum boweing towards R)   bronch    +C Diff    CT C/A/P: Fluid filled colon which may be 2/2 rapid transit. Small bilateral pleffs with associates. Compressive atelectasis New ISABELLE & LLL  parenchymal opacities, suspicious for pneumonia. Moderate stenosis in the proximal superior mesenteric artery.    #8 Shiley trach at bedside    LVAD speed increased to 9200   Bronch   TC since . Patient transferred to SDU.    INR today 2.64.  H&H 7.3/24 this AM.  Will repeat CBC at noon, and will send stool guaiac Patient with persistent abdominal tenderness, rate of tube feeds decreased.  No nausea/vomiting.     INR today 2.4. H&H 9.1/28.6 low flow overnight /N&V, refusing Tube feeds on D5 1/2 normal  @50 cc/hr   INR 2.69  H&H 7.7/.1 refusing Tube feeds on D5 1/2 normal  @50 cc/hr. This am + BM Melena Dr Oneill HF  aware- PRBC x1  GI team consulted -  NPO  plan on study in am-  D/w Dr Cadet Patient  to return to CTU for further management; 1PRBCS    Post op INR 2.2 today.  No bleeding. BC + for SM.  Pt is hypotensive requiring pressor and inotropic support.  ID follow up today on Cefepime will follow.   R PTC for PTX    CT C/A/P: sub q emphysema in R chest wall, GGO RUL, small ascites CTH negative; Abd US: GB thickening, pericholecystic fluid     Perchole drain in place continues to drain total output overnight 133.  Fever today 38.8    duplex LE negative    Patient with persistent abdominal pain, refusing tube feeds and medications, Psych consulted   CTA A/P ordered to r/o mesenteric ischemia 2/2 persistent anorexia, nausea, vomiting. Revealed:  Evaluation of the mesenteric vessels is limited by streak artifact from LVAD. There appears to be severe stenosis of the proximal SMA; abdominal mesenteric doppler is recommended for further evaluation. 2.  No small bowel findings to suggest acute mesenteric ischemia. 3.  Focal dissections involving the right and left common iliac arteries.  8/15: Cultures resulted BC 1/2 +GPC in clusters, SC enterobacter; mesenteric duplex: borderline stenosis of proximal SMA  : CT C/A/P noncon: Nondular opacities in R lung apex w/cavitation, abd nl  :  Continue current care, treatment of thyrotoxicosis with medications as per endocrine, d/c ABX as per team.    RUQ sono: Contracted gallbladder with cholecystostomy tube in place.  9/10 failed SMA stent, c/b RP bleed s/p 3U PRCB   CTA Abd/Pelvis L RP hematoma    Trach decannulated    intubated fro resp distress for increased WOB, LL pneumonia; CT C/A/P: progressive ISABELLE opacities and L consolidations, multifocal pneumonia    TTE (EF 25%, decreased RV, mod MR)   10/3 VT -> Lidocaine gtt   10/4 Trach size 6 DCT cuff  10/5 CT abd (neg for intra-abd abcess, severe stenosis of prox SMA and b/l iliac arteries)  10/18 SMA Stent   10/23 Emend for nausea   10/24 +Occult    Today:  - tolerating PO pureed diet along w/ tube feeds, continue to advance diet as tolerated  - transfer to SDU    REVIEW OF SYSTEMS:  Speak over trach   Gen: No fever  EYES/ENT: No visual changes;  No vertigo or throat pain   NECK: No pain   RES:  Some SOB briefly, otherwise no Cough  CARD: No chest pain   GI: +intermittently c/o abd pain and nausea   : No dysuria  NEURO: No weakness  SKIN: No itching, rashes       ICU Vital Signs Last 24 Hrs  T(C): 36.8 (2021 08:00), Max: 37.1 (2021 00:00)  T(F): 98.2 (2021 08:00), Max: 98.8 (2021 00:00)  HR: 108 (2021 08:45) (93 - 126)  BP: --  BP(mean): 80 (2021 08:00) (80 - 86)  ABP: --  ABP(mean): --  RR: 21 (2021 08:45) (14 - 28)  SpO2: 100% (2021 08:45) (99% - 100%)      Physical Exam:  Gen:  NAD  CNS: intermittently agitated but responds to commands  Neck: no JVD, +trach, +central line   RES : coarse breath sounds, no wheezing    CVS: +LVAD hum   Abd: Soft. Sybil drain with bilious fluid. Positive BS throughout. Mild RUQ abdominal tenderness by perc sybil.  Skin: No rash, erythema, cyanosis.  Vasc: Warm and well-perfused.  Ext:  no edema      ============================I/O===========================   I&O's Detail    31 Oct 2021 07:01  -  2021 07:00  --------------------------------------------------------  IN:    Miscellaneous Tube Feedin mL    sodium chloride 0.9%: 100 mL  Total IN: 1100 mL    OUT:    Drain (mL): 300 mL    Voided (mL): 480 mL  Total OUT: 780 mL    Total NET: 320 mL      2021 07:01  -  2021 10:50  --------------------------------------------------------  IN:    Miscellaneous Tube Feedin mL  Total IN: 200 mL    OUT:    Drain (mL): 0 mL  Total OUT: 0 mL    Total NET: 200 mL        ============================ LABS =========================                        9.4    8.05  )-----------( 312      ( 2021 00:29 )             28.8         130<L>  |  96  |  24<H>  ----------------------------<  122<H>  4.3   |  24  |  0.45<L>    Ca    9.5      2021 00:29  Phos  2.9       Mg     1.8         TPro  7.9  /  Alb  3.6  /  TBili  0.5  /  DBili  x   /  AST  53<H>  /  ALT  102<H>  /  AlkPhos  212<H>      LIVER FUNCTIONS - ( 2021 00:29 )  Alb: 3.6 g/dL / Pro: 7.9 g/dL / ALK PHOS: 212 U/L / ALT: 102 U/L / AST: 53 U/L / GGT: x                 ======================Micro/Rad/Cardio=================  Culture: Reviewed   CXR: Reviewed  Echo:Reviewed  ======================================================  PAST MEDICAL & SURGICAL HISTORY:  CHF (congestive heart failure)    CAD (coronary artery disease)    Depression    Pleural effusion    History of  novel coronavirus disease (COVID-19)  2020    Hemorrhoids    Bleeding hemorrhoids    Peripheral arterial disease    Claudication    BPH with urinary obstruction    ACC/AHA stage D systolic heart failure    Anticoagulation goal of INR 2.0 to 2.5    Falls    Clavicle fracture    CKD (chronic kidney disease), stage III    Iron deficiency anemia    H/O epistaxis    Vertigo    GI bleed    S/P TVR (tricuspid valve repair)    S/P ventricular assist device    S/P endoscopy      ====================ASSESSMENT ==============  Stage D Nonischemic Cardiomyopathy, Status Post HM2 on 2017    Cardiogenic shock  Hemodynamic instability   Acute hypoxemic respiratory failure s/p trach , decannulated on ; Reintubated on    GI bleed , Status Post Enteroscopy   Anemia, in setting of melena   Chronic Kidney Disease  Stress hyperglycemia   C.diff positive on    Hypovolemic shock  Septic shock  Leukocytosis  GB thickening/percholecystic s/p perc sybil by IR   SMA stenosis s/p SMA Stent on 10/18   Serratia/citrobacter pneumonia   Stenotrophomonas pneumonia   Enterobacter pneumonia   Nasuea/vomiting  Deconditioning  Hyponatremia  DNR / No vent    Plan:  ====================== NEUROLOGY=====================  Intermittently agitated  OOB, ambulate as tolerated  Continue close monitoring of neuro status   C/w sertraline, mirtazapine for depression   PRN Fentanyl for pain     fentaNYL    Injectable 12 MICROGram(s) IV Push every 3 hours PRN Moderate to severe pain  mirtazapine 7.5 milliGRAM(s) Oral at bedtime  sertraline 100 milliGRAM(s) Oral daily  ==================== RESPIRATORY======================  Acute hypoxemic respiratory failure s/p #8 shiley trach on ; decannulated on ; Reintubated on ; trach size 6 DCT cuff on 10/4   Continue close monitoring of respiratory rate and breathing pattern, following of ABG’s with [place] monitoring, continuous pulse oximetry monitoring.   Tolerating TC since 10/19, continue TC as tolerated   Guaifenesin PO PRN mucous plugging   DNR/ no vent    Mechanical Ventilation:    guaiFENesin Oral Liquid (Sugar-Free) 200 milliGRAM(s) Oral every 4 hours PRN mucous plugging    ====================CARDIOVASCULAR==================  Stage D Nonischemic Cardiomyopathy, Status Post HM2 on 2017; LVAD settings 9200, flow 5.0  TTE 10/4:  Severely decreased LV systolic function, Diffuse hypokinesis. Mild AR. No pericardial effusion   VT resolved, continue rate control w/ Lopressor   ASA/Plavix for recent SMA stent on hold due to drop in h/h and +occult feces on 10/24    metoprolol tartrate 12.5 milliGRAM(s) Oral every 12 hours  urea Oral Powder 15 Gram(s) Oral daily  ===================HEMATOLOGIC/ONC ===================  CTA A/P on  +L RP hematoma;   Acute blood loss anemia, monitor H&H/Plts   LUE US on 10/20 with no pseudoaneurysm  ASA/Plavix for recent SMA stent on hold for recent drop in h/h in setting of (+) guaiac (on 10/24)   ===================== RENAL =========================  Hx of CKD, continue monitoring urine output, I&OS, BUN/Cr   Replete lytes PRN. Keep K> 4 and Mg >2  Hyponatremia  ==================== GASTROINTESTINAL===================  CT A/P 10/5 neg for intra-abd abcess, severe stenosis of prox SMA and b/l iliac arteries  CTA A/P : Evaluation of the mesenteric vessels is limited by streak artifact from LVAD. There appears to be severe stenosis of the proximal SMA; abdominal mesenteric doppler is recommended for further evaluation. 2.  No small bowel findings to suggest acute mesenteric ischemia. 3.  Focal dissections involving the right and left common iliac arteries.  Stenosis of proximal SMA, s/p failed SMA stent c/b RP bleed on 9/10 - SMA stent with Vascular 10/18   Simethicone PRN for gas   Reglan for gut motility  Zofran for nausea   +occult feces on 10/24   RUQ U/S findings of pericholecystic fluid despite perc sybil drain in place as well as ascites, will follow-up with GI regarding potential contrast study  Tolerating tube feeds, Vital @ 50cc/hr. Passed FEES 10/29, now able to supplement NGT feedings with PO pureed diet with mildly thickened liquids when sitting upright OOBTC  Continue Protonix for stress ulcer prophylaxis.     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   BCx 10/14 and SCX on 10/14 +Stenotrophomonas maltophilia s/p Bactrim   Empiric coverage with Ciprofloxacin for chronic suppression, PO Vanco for C. Diff ppx  Will discuss with Gen Sx RUQ U/S findings of pericholecystic fluid despite perc sybil drain in place as well as ascites    ciprofloxacin     Tablet 500 milliGRAM(s) Oral every 12 hours  vancomycin    Solution 125 milliGRAM(s) Oral every 12 hours      Patient requires continuous monitoring with bedside rhythm monitoring, pulse ox monitoring, and intermittent blood gas analysis. Care plan discussed with ICU care team. Patient remained critical and at risk for life threatening decompensation.     By signing my name below, I, Emily Roa, attest that this documentation has been prepared under the direction and in the presence of CLAUDIA Gale   Electronically signed: Emily Roa Scribe, 21 @ 10:50    I, CLAUDIA Gale  , personally performed the services described in this documentation. all medical record entries made by the luisibmel were at my direction and in my presence. I have reviewed the chart and agree that the record reflects my personal performance and is accurate and complete  Electronically signed: CLAUDIA Gale

## 2021-11-01 NOTE — PROGRESS NOTE ADULT - SUBJECTIVE AND OBJECTIVE BOX
[facility] NUTRITION SUPPORT-- FOLLOW UP NOTE  --------------------------------------------------------------------------------    24 hour events/subjective: pt seen and examined. resting in bed. tf running at goal at 50cc/hr. states tolerating po diet. +gi fxn. denies n/v/abd pain.    Diet:  Diet, Pureed:   Mildly Thick Liquids (MILDTHICKLIQS)  Tube Feeding Modality: Nasogastric  Vital 1.5 Tank (VITAL1.5RTH)  Total Volume for 24 Hours (mL): 1200  Continuous  Starting Tube Feed Rate mL per Hour: 50  Until Goal Tube Feed Rate (mL per Hour): 50  Tube Feed Duration (in Hours): 24  Tube Feed Start Time: 15:40 (10-29-21 @ 15:40)      PAST HISTORY  --------------------------------------------------------------------------------  No significant changes to PMH, PSH, FHx, SHx, unless otherwise noted    ALLERGIES & MEDICATIONS  --------------------------------------------------------------------------------  Allergies    Amiodarone Hydrochloride (Other (Severe))    Intolerances      Standing Inpatient Medications  chlorhexidine 2% Cloths 1 Application(s) Topical <User Schedule>  ciprofloxacin     Tablet 500 milliGRAM(s) Oral every 12 hours  insulin lispro (ADMELOG) corrective regimen sliding scale   SubCutaneous every 6 hours  methimazole 15 milliGRAM(s) Oral daily  metoclopramide Injectable 10 milliGRAM(s) IV Push every 8 hours  metoprolol tartrate 12.5 milliGRAM(s) Oral every 12 hours  mirtazapine 7.5 milliGRAM(s) Oral at bedtime  multivitamin 1 Tablet(s) Oral daily  ondansetron Injectable 8 milliGRAM(s) IV Push every 8 hours  pantoprazole  Injectable 40 milliGRAM(s) IV Push every 12 hours  predniSONE   Tablet 2 milliGRAM(s) Oral daily  sertraline 100 milliGRAM(s) Oral daily  thiamine 100 milliGRAM(s) Oral daily  urea Oral Powder 15 Gram(s) Oral daily  vancomycin    Solution 125 milliGRAM(s) Oral every 12 hours    PRN Inpatient Medications  fentaNYL    Injectable 12 MICROGram(s) IV Push every 3 hours PRN  guaiFENesin Oral Liquid (Sugar-Free) 200 milliGRAM(s) Oral every 4 hours PRN  simethicone 80 milliGRAM(s) Chew every 8 hours PRN        REVIEW OF SYSTEMS  --------------------------------------------------------------------------------    General:  as per hpi  HEENT:  no headaches, no vision changes, no ear pain  CV:  no chest pain, no palpitations  Resp:  no dyspnea  GI: as per hpi  :  no pain, no bleeding, no dysuria  Muscle:  no pain  Neuro:  no numbness, no tingling, no memory problems  Psych:  no insomnia  Endocrine:  no cold/heat intolerance  Heme: no ecchymosis, no easy bruising  Skin:  no rash, no edema        VITALS/PHYSICAL EXAM  --------------------------------------------------------------------------------  T(C): 36.8 (21 @ 08:00), Max: 37.1 (21 @ 00:00)  HR: 105 (21 @ 08:00) (90 - 126)  BP: --  RR: 21 (21 @ 08:00) (14 - 28)  SpO2: 100% (21 @ 08:00) (99% - 100%)  Wt(kg): --      10-31-21 @ 07:01  -  21 @ 07:00  --------------------------------------------------------  IN: 1100 mL / OUT: 780 mL / NET: 320 mL    21 @ 07:01  -  21 @ 08:36  --------------------------------------------------------  IN: 50 mL / OUT: 0 mL / NET: 50 mL      I&O's Detail    31 Oct 2021 07:01  -  2021 07:00  --------------------------------------------------------  IN:    Miscellaneous Tube Feedin mL    sodium chloride 0.9%: 100 mL  Total IN: 1100 mL    OUT:    Drain (mL): 300 mL    Voided (mL): 480 mL  Total OUT: 780 mL    Total NET: 320 mL      2021 07:  -  2021 08:36  --------------------------------------------------------  IN:    Miscellaneous Tube Feedin mL  Total IN: 50 mL    OUT:    Drain (mL): 0 mL  Total OUT: 0 mL    Total NET: 50 mL          Physical Exam:  Gen: lying in bed in nad, frail +ngt  HEENT: +trach  Chest: respirations non labored  Abd: soft  nondistended +perc c tube  LE:  no edema  Neuro: awake alert appropriate        LABS/STUDIES  --------------------------------------------------------------------------------              9.4    8.05  >-----------<  312      [21 00:29]              28.8     130  |  96  |  24  ----------------------------<  122      [21 00:29]  4.3   |  24  |  0.45        Ca     9.5     [21 00:29]      Mg     1.8     [21 00:29]      Phos  2.9     [21 00:29]    TPro  7.9  /  Alb  3.6  /  TBili  0.5  /  DBili  x   /  AST  53  /  ALT  102  /  AlkPhos  212  [21 00:29]              [21:29]    Ca ionized  Creatinine Trend:  POC glucoseGlucose, Serum: 122 mg/dL (21 00:29)  CAPILLARY BLOOD GLUCOSE      POCT Blood Glucose.: 124 mg/dL (2021 05:32)  POCT Blood Glucose.: 122 mg/dL (2021 00:03)  POCT Blood Glucose.: 126 mg/dL (31 Oct 2021 17:06)  POCT Blood Glucose.: 122 mg/dL (31 Oct 2021 12:41)    Prealbumin  Triglycerides

## 2021-11-01 NOTE — PROGRESS NOTE ADULT - PROBLEM SELECTOR PLAN 1
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 Unable to get a thyroid US due to his trach.   -Continuing to taper off steroids as thus: 2mg po qdaily x 3 days, and 1mg po qdaily x 3 days, then stop.  -Check FT4 tomorrow, continue MMI 15mg po daily.  Continue to assess bilirubin and LFTs. MMI can have rare SE of agranulocytosis and hepatic dysfunction, but more commonly its biliary in nature.

## 2021-11-02 NOTE — PROGRESS NOTE ADULT - SUBJECTIVE AND OBJECTIVE BOX
Subjective: Patient seen and examined resting in bed.     Medications:  chlorhexidine 2% Cloths 1 Application(s) Topical <User Schedule>  ciprofloxacin     Tablet 500 milliGRAM(s) Oral every 12 hours  fentaNYL    Injectable 12 MICROGram(s) IV Push every 3 hours PRN  guaiFENesin Oral Liquid (Sugar-Free) 200 milliGRAM(s) Oral every 4 hours PRN  insulin lispro (ADMELOG) corrective regimen sliding scale   SubCutaneous every 6 hours  magnesium oxide 400 milliGRAM(s) Oral every 12 hours  methimazole 15 milliGRAM(s) Oral daily  metoclopramide Injectable 10 milliGRAM(s) IV Push every 8 hours  metoprolol tartrate 12.5 milliGRAM(s) Oral every 12 hours  mirtazapine 7.5 milliGRAM(s) Oral at bedtime  multivitamin 1 Tablet(s) Oral daily  ondansetron Injectable 8 milliGRAM(s) IV Push every 8 hours  pantoprazole  Injectable 40 milliGRAM(s) IV Push every 12 hours  predniSONE   Tablet 2 milliGRAM(s) Oral daily  sertraline 100 milliGRAM(s) Oral daily  simethicone 80 milliGRAM(s) Chew every 8 hours PRN  thiamine 100 milliGRAM(s) Oral daily  urea Oral Powder 15 Gram(s) Oral daily  vancomycin    Solution 125 milliGRAM(s) Oral every 12 hours    ICU Vital Signs Last 24 Hrs  T(C): 35.6 (2021 04:00), Max: 36.9 (2021 16:00)  T(F): 96 (2021 04:00), Max: 98.5 (2021 16:00)  HR: 94 (2021 07:00) (94 - 116)  BP: --  BP(mean): 84 (2021 06:00) (78 - 86)  ABP: --  ABP(mean): --  RR: 10 (2021 07:00) (10 - 26)  SpO2: 100% (2021 07:00) (100% - 100%)    Weight in k.5 ( @ 00:00)    I&O's Summary    2021 07:01  -  2021 07:00  --------------------------------------------------------  IN: 1784 mL / OUT: 1075 mL / NET: 709 mL        Physical Exam  General: resting in bed, frail and thin appearing   Neck: +trach collar  Chest: Clear to auscultation bilaterally  CV: +VAD hum  Abdomen: Soft, non-distended, non-tender, +keotube, +bilary drain   Skin: No rashes or skin breakdown  Neurology: Alert and oriented times three.     LVAD Interrogation  VAD TYPE HM 2  Speed 9200  Flow 4.4  Power 5.2  PI  7.2  Assessment of driveline exit site: driveline dressing c/d/i  Programming changes: no changes made    Labs:                        9.0    9.24  )-----------( 335      ( 2021 01:09 )             27.9     11-    133<L>  |  96  |  26<H>  ----------------------------<  123<H>  4.0   |  26  |  0.50    Ca    9.7      2021 01:10  Phos  2.9       Mg     1.5         TPro  7.7  /  Alb  3.8  /  TBili  0.4  /  DBili  x   /  AST  44<H>  /  ALT  89<H>  /  AlkPhos  210<H>  11              Lactate Dehydrogenase, Serum: 246 U/L ( @ 01:10)  Lactate Dehydrogenase, Serum: 288 U/L ( @ 00:29)  Lactate Dehydrogenase, Serum: 273 U/L (10-31 @ 00:43)

## 2021-11-02 NOTE — PROGRESS NOTE ADULT - ASSESSMENT
67 YO M with a history of stage D NICM s/p HM2 on 9/2017 as DT (due to severe PAD) with TV ring, prior COVID-19 infection 4/2020, recurrent syncope post LVAD s/p ILR, and chronic abdominal pain with prior negative workup who was admitted 6/14/21 with symptomatic anemia with Hgb 4.5 in setting of INR 8.8 without hemodynamic instability and has since had a protracted hospitalization. He was transfused and underwent VCE which showed active bleeding in the mid small bowel but subsequent enteroscopy 6/15 did not reveal any active bleeding. He acutely decompensated after procedure with fever/hypertension, low flow alarms, and pulmonary infiltrate with hypoxia requiring intubation from probable aspiration PNA. He was unable to extubated and has since undergone tracheostomy but tolerating persistent trach collar and nearing ability for decannulation. 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 He underwent angiogram on 9/10, stent was unable to be placed and course was then complicated by RP bleed. He continued to have persistent leukocytosis and febrile episodes and was noted to have positive sputum culture for serratia marcescens and completed his course of abx. He was initially decannulated on 9/23. Recently he started having multiples of melena, emesis and went into acte respiratory distress and was ultimately re-intubated on 9/26.     He had blood cultures positive for enterobacter cloacae/serratia and sputum positive for stenotrophomonas remains on IV antibiotics. He is s/p trach with ENT on 10/4. He underwent SMA stent x 2 on 10/18. His nausea and vomiting has improved, and he is now tolerating PO diet along with tube feeds. Fecal occult blood was noted to be positive on 10/23, he is currently off DAPT. Has been transfused with multiple units of PRBC during this admission, last on 10/23. His overall prognosis remains poor, he is now DNR after family meeting.

## 2021-11-02 NOTE — PROGRESS NOTE ADULT - PROBLEM SELECTOR PLAN 2
- Stable w/o evidence of LVAD malfunction, settings as above  - H/o recurrent GI bleeding  - LDH elevated but stable   - now DNR

## 2021-11-02 NOTE — PROGRESS NOTE ADULT - PROBLEM SELECTOR PLAN 1
- ACC/AHA stage D systolic heart failure s/p LVAD   - Currently on Lopressor 12.5 mg PO BID, (MAP goal 70-80). if able to tolerate will be transition to Toprol XL 25 mg PO tomorrow - ACC/AHA stage D systolic heart failure s/p LVAD   - Currently on Lopressor 12.5 mg PO BID, (MAP goal 70-80).

## 2021-11-02 NOTE — PROGRESS NOTE ADULT - PROBLEM SELECTOR PLAN 5
- continues to have worsening leukocytosis, however is afebrile  - currently on Cipro 500 mg PO QD   - new cultures will be taken today. last culture 10/23 negative  - Prior cholecystitis w/ per dawn drain with bilious output, will obtain culture from drain today - leukocytosis improved, remains afebrile   - currently on Cipro 500 mg PO QD   - new cultures will be taken today. last culture 10/23 negative  - Prior cholecystitis w/ per dawn drain with bilious output

## 2021-11-02 NOTE — PROGRESS NOTE ADULT - PROBLEM SELECTOR PLAN 1
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 Unable to get a thyroid US due to his trach.   -FT4 today was 1.4, can reduce MMI 10 mg po qdaily, will recehck in a week  -Continuing to taper off steroids as thus: 2mg po qdaily x 3 days, and 1mg po qdaily x 3 days, then stop.  Continue to assess bilirubin and LFTs. MMI can have rare SE of agranulocytosis and hepatic dysfunction, but more commonly its biliary in nature.

## 2021-11-02 NOTE — PROGRESS NOTE ADULT - SUBJECTIVE AND OBJECTIVE BOX
RICKY HAMPTON  MRN-[id number]  Patient is a 66y old  Male who presents with a chief complaint of Anemia, Supratherapeutic INR, Dark Stools (2021 07:27)    HPI:  65M PMH ACC/AHA stage D HF due to NICM HM2 LVAD , TV annuloplasty ring 17 as destination therapy due to severe peripheral artery disease with significant stenosis  SIADH, Depression, CKD-3 with hyperkalemia, past E. coli UTIs, driveline drainage (21) and COVID-19 (back in 2020)  He was recently seen in clinic where he complained of abdominal pain and dark stools w constipation back in May. He presents to [facility] ER today weakness and fatigue, moderate and + Black stools for three days, on coumadin secondary to warfarin use in the setting of an LVAD. Patient has required transfusions for GIB in the past. Mostly recently back in 2021 pt had anemia with dark stools. No interventions was done at that time. However Last Endoscopy was done in 2020 (negative). Today labs show patient is anemic with H/H of 4.5/16.3,. INR is 8.84 MAP in the 90s, Temp 35.1. He denies any chest pain, shortness of breath, dizziness, abd pain, nausea or vomiting.       (2021 16:57)      Surgery/Hospital Course:   admit for melena w/ anemia, INR 8.84   6/15 Capsul study (+) for small bowel bleed, balloon endoscopy (old blood in prox ileum); post EGD - septic w/ L opacity, re-intubated for concern for aspiration, TTE (Mod MR, decrease biV w/ interventricular septum boweing towards R)   bronch    +C Diff    CT C/A/P: Fluid filled colon which may be 2/2 rapid transit. Small bilateral pleffs with associates. Compressive atelectasis New ISABELLE & LLL  parenchymal opacities, suspicious for pneumonia. Moderate stenosis in the proximal superior mesenteric artery.    #8 Shiley trach at bedside    LVAD speed increased to 9200   Bronch   TC since . Patient transferred to SDU.    INR today 2.64.  H&H 7.3/24 this AM.  Will repeat CBC at noon, and will send stool guaiac Patient with persistent abdominal tenderness, rate of tube feeds decreased.  No nausea/vomiting.     INR today 2.4. H&H 9.1/28.6 low flow overnight /N&V, refusing Tube feeds on D5 1/2 normal  @50 cc/hr   INR 2.69  H&H 7.7/.1 refusing Tube feeds on D5 1/2 normal  @50 cc/hr. This am + BM Melena Dr Oneill HF  aware- PRBC x1  GI team consulted -  NPO  plan on study in am-  D/w Dr Cadet Patient  to return to CTU for further management; 1PRBCS    Post op INR 2.2 today.  No bleeding. BC + for SM.  Pt is hypotensive requiring pressor and inotropic support.  ID follow up today on Cefepime will follow.   R PTC for PTX    CT C/A/P: sub q emphysema in R chest wall, GGO RUL, small ascites CTH negative; Abd US: GB thickening, pericholecystic fluid     Perchole drain in place continues to drain total output overnight 133.  Fever today 38.8    duplex LE negative    Patient with persistent abdominal pain, refusing tube feeds and medications, Psych consulted   CTA A/P ordered to r/o mesenteric ischemia 2/2 persistent anorexia, nausea, vomiting. Revealed:  Evaluation of the mesenteric vessels is limited by streak artifact from LVAD. There appears to be severe stenosis of the proximal SMA; abdominal mesenteric doppler is recommended for further evaluation. 2.  No small bowel findings to suggest acute mesenteric ischemia. 3.  Focal dissections involving the right and left common iliac arteries.  8/15: Cultures resulted BC 1/2 +GPC in clusters, SC enterobacter; mesenteric duplex: borderline stenosis of proximal SMA  : CT C/A/P noncon: Nondular opacities in R lung apex w/cavitation, abd nl  :  Continue current care, treatment of thyrotoxicosis with medications as per endocrine, d/c ABX as per team.    RUQ sono: Contracted gallbladder with cholecystostomy tube in place.  9/10 failed SMA stent, c/b RP bleed s/p 3U PRCB   CTA Abd/Pelvis L RP hematoma    Trach decannulated    intubated fro resp distress for increased WOB, LL pneumonia; CT C/A/P: progressive ISABELLE opacities and L consolidations, multifocal pneumonia    TTE (EF 25%, decreased RV, mod MR)   10/3 VT -> Lidocaine gtt   10/4 Trach size 6 DCT cuff  10/5 CT abd (neg for intra-abd abcess, severe stenosis of prox SMA and b/l iliac arteries)  10/18 SMA Stent   10/23 Emend for nausea   10/24 +Occult    Today:    REVIEW OF SYSTEMS:  Speak over trach   Gen: No fever  EYES/ENT: No visual changes;  No vertigo or throat pain   NECK: No pain   RES:  Some SOB briefly, otherwise no Cough  CARD: No chest pain   GI: +intermittently c/o abd pain and nausea   : No dysuria  NEURO: No weakness  SKIN: No itching, rashes       ICU Vital Signs Last 24 Hrs  T(C): 35.6 (2021 04:00), Max: 36.9 (2021 16:00)  T(F): 96 (2021 04:00), Max: 98.5 (2021 16:00)  HR: 94 (2021 07:00) (94 - 116)  BP: --  BP(mean): 84 (2021 06:00) (78 - 86)  ABP: --  ABP(mean): --  RR: 10 (2021 07:00) (10 - 26)  SpO2: 100% (2021 07:00) (100% - 100%)      Physical Exam:  Gen:  NAD  CNS: intermittently agitated but responds to commands  Neck: no JVD, +trach, +central line   RES : coarse breath sounds, no wheezing    CVS: +LVAD hum   Abd: Soft. Sybil drain with bilious fluid. Positive BS throughout. Mild RUQ abdominal tenderness by perc sybil.  Skin: No rash, erythema, cyanosis.  Vasc: Warm and well-perfused.  Ext:  no edema    ============================I/O===========================   I&O's Detail    2021 07:01  -  2021 07:00  --------------------------------------------------------  IN:    Enteral Tube Flush: 180 mL    IV PiggyBack: 50 mL    Miscellaneous Tube Feedin mL    Oral Fluid: 354 mL  Total IN: 1784 mL    OUT:    Drain (mL): 300 mL    Voided (mL): 775 mL  Total OUT: 1075 mL    Total NET: 709 mL      2021 07:01  -  2021 08:23  --------------------------------------------------------  IN:    Miscellaneous Tube Feedin mL  Total IN: 50 mL    OUT:  Total OUT: 0 mL    Total NET: 50 mL        ============================ LABS =========================                        9.0    9.24  )-----------( 335      ( 2021 01:09 )             27.9     11-02    133<L>  |  96  |  26<H>  ----------------------------<  123<H>  4.0   |  26  |  0.50    Ca    9.7      2021 01:10  Phos  2.9     11-  Mg     1.5     -    TPro  7.7  /  Alb  3.8  /  TBili  0.4  /  DBili  x   /  AST  44<H>  /  ALT  89<H>  /  AlkPhos  210<H>      LIVER FUNCTIONS - ( 2021 01:10 )  Alb: 3.8 g/dL / Pro: 7.7 g/dL / ALK PHOS: 210 U/L / ALT: 89 U/L / AST: 44 U/L / GGT: x                 ======================Micro/Rad/Cardio=================  Culture: Reviewed   CXR: Reviewed  Echo:Reviewed  ======================================================  PAST MEDICAL & SURGICAL HISTORY:  CHF (congestive heart failure)    CAD (coronary artery disease)    Depression    Pleural effusion    History of  novel coronavirus disease (COVID-19)  2020    Hemorrhoids    Bleeding hemorrhoids    Peripheral arterial disease    Claudication    BPH with urinary obstruction    ACC/AHA stage D systolic heart failure    Anticoagulation goal of INR 2.0 to 2.5    Falls    Clavicle fracture    CKD (chronic kidney disease), stage III    Iron deficiency anemia    H/O epistaxis    Vertigo    GI bleed    S/P TVR (tricuspid valve repair)    S/P ventricular assist device    S/P endoscopy      ====================ASSESSMENT ==============  Stage D Nonischemic Cardiomyopathy, Status Post HM2 on 2017    Cardiogenic shock  Hemodynamic instability   Acute hypoxemic respiratory failure s/p trach , decannulated on ; Reintubated on    GI bleed , Status Post Enteroscopy   Anemia, in setting of melena   Chronic Kidney Disease  Stress hyperglycemia   C.diff positive on    Hypovolemic shock  Septic shock  Leukocytosis  GB thickening/percholecystic s/p perc sybil by IR   SMA stenosis s/p SMA Stent on 10/18   Serratia/citrobacter pneumonia   Stenotrophomonas pneumonia   Enterobacter pneumonia   Nasuea/vomiting  Deconditioning  Hyponatremia  DNR / No vent    Plan:  ====================== NEUROLOGY=====================  Intermittently agitated  OOB, ambulate as tolerated  Continue close monitoring of neuro status   C/w sertraline, mirtazapine for depression   PRN Fentanyl for pain     fentaNYL    Injectable 12 MICROGram(s) IV Push every 3 hours PRN Moderate to severe pain  mirtazapine 7.5 milliGRAM(s) Oral at bedtime  sertraline 100 milliGRAM(s) Oral daily  ==================== RESPIRATORY======================  Acute hypoxemic respiratory failure s/p #8 shiley trach on ; decannulated on ; Reintubated on ; trach size 6 DCT cuff on 10/4   Continue close monitoring of respiratory rate and breathing pattern, following of ABG’s with [place] monitoring, continuous pulse oximetry monitoring.   Tolerating TC since 10/19, continue TC as tolerated   Guaifenesin PO PRN mucous plugging   DNR/ no vent    guaiFENesin Oral Liquid (Sugar-Free) 200 milliGRAM(s) Oral every 4 hours PRN mucous plugging  ====================CARDIOVASCULAR==================  Stage D Nonischemic Cardiomyopathy, Status Post HM2 on 2017; LVAD settings 9200, flow 5.0  TTE 10/4:  Severely decreased LV systolic function, Diffuse hypokinesis. Mild AR. No pericardial effusion   VT resolved, continue rate control w/ Lopressor   ASA/Plavix for recent SMA stent on hold due to drop in h/h and +occult feces on 10/24    metoprolol tartrate 12.5 milliGRAM(s) Oral every 12 hours  urea Oral Powder 15 Gram(s) Oral daily  ===================HEMATOLOGIC/ONC ===================  CTA A/P on  +L RP hematoma;   Acute blood loss anemia, monitor H&H/Plts   LUE US on 10/20 with no pseudoaneurysm  ===================== RENAL =========================  Hx of CKD, continue monitoring urine output, I&OS, BUN/Cr   Replete lytes PRN. Keep K> 4 and Mg >2  Hyponatremia  ==================== GASTROINTESTINAL===================  CT A/P 10/5 neg for intra-abd abcess, severe stenosis of prox SMA and b/l iliac arteries  CTA A/P : Evaluation of the mesenteric vessels is limited by streak artifact from LVAD. There appears to be severe stenosis of the proximal SMA; abdominal mesenteric doppler is recommended for further evaluation. 2.  No small bowel findings to suggest acute mesenteric ischemia. 3.  Focal dissections involving the right and left common iliac arteries.  Stenosis of proximal SMA, s/p failed SMA stent c/b RP bleed on 9/10 - SMA stent with Vascular 10/18   Simethicone PRN for gas   Reglan for gut motility  Zofran for nausea   +occult feces on 10/24   RUQ U/S findings of pericholecystic fluid despite perc sybil drain in place as well as ascites, will follow-up with GI regarding potential contrast study  Tolerating tube feeds, Vital @ 50cc/hr. Passed FEES 10/29, now able to supplement NGT feedings with PO pureed diet with mildly thickened liquids when sitting upright OOBTC  Continue Protonix for stress ulcer prophylaxis.     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   BCx 10/14 and SCX on 10/14 +Stenotrophomonas maltophilia s/p Bactrim   Empiric coverage with Ciprofloxacin for chronic suppression, PO Vanco for C. Diff ppx  Will discuss with Gen Sx RUQ U/S findings of pericholecystic fluid despite perc sybil drain in place as well as ascites    ciprofloxacin     Tablet 500 milliGRAM(s) Oral every 12 hours  vancomycin    Solution 125 milliGRAM(s) Oral every 12 hours      Patient requires continuous monitoring with bedside rhythm monitoring, pulse ox monitoring, and intermittent blood gas analysis. Care plan discussed with ICU care team. Patient remained critical and at risk for life threatening decompensation.     By signing my name below, I, Emily Roa, attest that this documentation has been prepared under the direction and in the presence of CLAUDIA Mejia   Electronically signed: Emily Roa Scribe, 21 @ 08:23    I, CLAUDIA Mejia  , personally performed the services described in this documentation. all medical record entries made by the romel were at my direction and in my presence. I have reviewed the chart and agree that the record reflects my personal performance and is accurate and complete  Electronically signed: CLAUDIA Mejia        RICKY HAMPTON  MRN-[id number]  Patient is a 66y old  Male who presents with a chief complaint of Anemia, Supratherapeutic INR, Dark Stools (2021 07:27)    HPI:  65M PMH ACC/AHA stage D HF due to NICM HM2 LVAD , TV annuloplasty ring 17 as destination therapy due to severe peripheral artery disease with significant stenosis  SIADH, Depression, CKD-3 with hyperkalemia, past E. coli UTIs, driveline drainage (21) and COVID-19 (back in 2020)  He was recently seen in clinic where he complained of abdominal pain and dark stools w constipation back in May. He presents to [facility] ER today weakness and fatigue, moderate and + Black stools for three days, on coumadin secondary to warfarin use in the setting of an LVAD. Patient has required transfusions for GIB in the past. Mostly recently back in 2021 pt had anemia with dark stools. No interventions was done at that time. However Last Endoscopy was done in 2020 (negative). Today labs show patient is anemic with H/H of 4.5/16.3,. INR is 8.84 MAP in the 90s, Temp 35.1. He denies any chest pain, shortness of breath, dizziness, abd pain, nausea or vomiting.       (2021 16:57)      Surgery/Hospital Course:   admit for melena w/ anemia, INR 8.84   6/15 Capsul study (+) for small bowel bleed, balloon endoscopy (old blood in prox ileum); post EGD - septic w/ L opacity, re-intubated for concern for aspiration, TTE (Mod MR, decrease biV w/ interventricular septum boweing towards R)   bronch    +C Diff    CT C/A/P: Fluid filled colon which may be 2/2 rapid transit. Small bilateral pleffs with associates. Compressive atelectasis New ISABELLE & LLL  parenchymal opacities, suspicious for pneumonia. Moderate stenosis in the proximal superior mesenteric artery.    #8 Shiley trach at bedside    LVAD speed increased to 9200   Bronch   TC since . Patient transferred to SDU.    INR today 2.64.  H&H 7.3/24 this AM.  Will repeat CBC at noon, and will send stool guaiac Patient with persistent abdominal tenderness, rate of tube feeds decreased.  No nausea/vomiting.     INR today 2.4. H&H 9.1/28.6 low flow overnight /N&V, refusing Tube feeds on D5 1/2 normal  @50 cc/hr   INR 2.69  H&H 7.7/.1 refusing Tube feeds on D5 1/2 normal  @50 cc/hr. This am + BM Melena Dr Oneill HF  aware- PRBC x1  GI team consulted -  NPO  plan on study in am-  D/w Dr Cadet Patient  to return to CTU for further management; 1PRBCS    Post op INR 2.2 today.  No bleeding. BC + for SM.  Pt is hypotensive requiring pressor and inotropic support.  ID follow up today on Cefepime will follow.   R PTC for PTX    CT C/A/P: sub q emphysema in R chest wall, GGO RUL, small ascites CTH negative; Abd US: GB thickening, pericholecystic fluid     Perchole drain in place continues to drain total output overnight 133.  Fever today 38.8    duplex LE negative    Patient with persistent abdominal pain, refusing tube feeds and medications, Psych consulted   CTA A/P ordered to r/o mesenteric ischemia 2/2 persistent anorexia, nausea, vomiting. Revealed:  Evaluation of the mesenteric vessels is limited by streak artifact from LVAD. There appears to be severe stenosis of the proximal SMA; abdominal mesenteric doppler is recommended for further evaluation. 2.  No small bowel findings to suggest acute mesenteric ischemia. 3.  Focal dissections involving the right and left common iliac arteries.  8/15: Cultures resulted BC 1/2 +GPC in clusters, SC enterobacter; mesenteric duplex: borderline stenosis of proximal SMA  : CT C/A/P noncon: Nondular opacities in R lung apex w/cavitation, abd nl  :  Continue current care, treatment of thyrotoxicosis with medications as per endocrine, d/c ABX as per team.    RUQ sono: Contracted gallbladder with cholecystostomy tube in place.  9/10 failed SMA stent, c/b RP bleed s/p 3U PRCB   CTA Abd/Pelvis L RP hematoma    Trach decannulated    intubated fro resp distress for increased WOB, LL pneumonia; CT C/A/P: progressive ISABELLE opacities and L consolidations, multifocal pneumonia    TTE (EF 25%, decreased RV, mod MR)   10/3 VT -> Lidocaine gtt   10/4 Trach size 6 DCT cuff  10/5 CT abd (neg for intra-abd abcess, severe stenosis of prox SMA and b/l iliac arteries)  10/18 SMA Stent   10/23 Emend for nausea   10/24 +Occult    Today:  no vomiting. continued trach collar    REVIEW OF SYSTEMS:  Speak over trach   Gen: No fever  EYES/ENT: No visual changes;  No vertigo or throat pain   NECK: No pain   RES:  Some SOB briefly, otherwise no Cough  CARD: No chest pain   GI: +intermittently c/o abd pain and nausea   : No dysuria  NEURO: No weakness  SKIN: No itching, rashes       ICU Vital Signs Last 24 Hrs  T(C): 35.6 (2021 04:00), Max: 36.9 (2021 16:00)  T(F): 96 (2021 04:00), Max: 98.5 (2021 16:00)  HR: 94 (2021 07:00) (94 - 116)  BP: --  BP(mean): 84 (2021 06:00) (78 - 86)  ABP: --  ABP(mean): --  RR: 10 (2021 07:00) (10 - 26)  SpO2: 100% (2021 07:00) (100% - 100%)      Physical Exam:  Gen:  NAD  CNS: intermittently agitated but responds to commands  Neck: no JVD, +trach, +central line   RES : coarse breath sounds, no wheezing    CVS: +LVAD hum   Abd: Soft. Sybil drain with bilious fluid. Positive BS throughout. Mild RUQ abdominal tenderness by perc sybil.  Skin: No rash, erythema, cyanosis.  Vasc: Warm and well-perfused.  Ext:  no edema    ============================I/O===========================   I&O's Detail    2021 07:01  -  2021 07:00  --------------------------------------------------------  IN:    Enteral Tube Flush: 180 mL    IV PiggyBack: 50 mL    Miscellaneous Tube Feedin mL    Oral Fluid: 354 mL  Total IN: 1784 mL    OUT:    Drain (mL): 300 mL    Voided (mL): 775 mL  Total OUT: 1075 mL    Total NET: 709 mL      2021 07:01  -  2021 08:23  --------------------------------------------------------  IN:    Miscellaneous Tube Feedin mL  Total IN: 50 mL    OUT:  Total OUT: 0 mL    Total NET: 50 mL        ============================ LABS =========================                        9.0    9.24  )-----------( 335      ( 2021 01:09 )             27.9     11-02    133<L>  |  96  |  26<H>  ----------------------------<  123<H>  4.0   |  26  |  0.50    Ca    9.7      2021 01:10  Phos  2.9     11-02  Mg     1.5     -    TPro  7.7  /  Alb  3.8  /  TBili  0.4  /  DBili  x   /  AST  44<H>  /  ALT  89<H>  /  AlkPhos  210<H>  11    LIVER FUNCTIONS - ( 2021 01:10 )  Alb: 3.8 g/dL / Pro: 7.7 g/dL / ALK PHOS: 210 U/L / ALT: 89 U/L / AST: 44 U/L / GGT: x                 ======================Micro/Rad/Cardio=================  Culture: Reviewed   CXR: Reviewed  Echo:Reviewed  ======================================================  PAST MEDICAL & SURGICAL HISTORY:  CHF (congestive heart failure)    CAD (coronary artery disease)    Depression    Pleural effusion    History of  novel coronavirus disease (COVID-19)  2020    Hemorrhoids    Bleeding hemorrhoids    Peripheral arterial disease    Claudication    BPH with urinary obstruction    ACC/AHA stage D systolic heart failure    Anticoagulation goal of INR 2.0 to 2.5    Falls    Clavicle fracture    CKD (chronic kidney disease), stage III    Iron deficiency anemia    H/O epistaxis    Vertigo    GI bleed    S/P TVR (tricuspid valve repair)    S/P ventricular assist device    S/P endoscopy      ====================ASSESSMENT ==============  Stage D Nonischemic Cardiomyopathy, Status Post HM2 on 2017    Cardiogenic shock  Hemodynamic instability   Acute hypoxemic respiratory failure s/p trach , decannulated on ; Reintubated on    GI bleed , Status Post Enteroscopy   Anemia, in setting of melena   Chronic Kidney Disease  Stress hyperglycemia   C.diff positive on    Hypovolemic shock  Septic shock  Leukocytosis  GB thickening/percholecystic s/p perc sybil by IR   SMA stenosis s/p SMA Stent on 10/18   Serratia/citrobacter pneumonia   Stenotrophomonas pneumonia   Enterobacter pneumonia   Nasuea/vomiting  Deconditioning  Hyponatremia  DNR / No vent    Plan:  ====================== NEUROLOGY=====================  Intermittently agitated  OOB, ambulate as tolerated  Continue close monitoring of neuro status   C/w sertraline, mirtazapine for depression   PRN Fentanyl for pain     fentaNYL    Injectable 12 MICROGram(s) IV Push every 3 hours PRN Moderate to severe pain  mirtazapine 7.5 milliGRAM(s) Oral at bedtime  sertraline 100 milliGRAM(s) Oral daily  ==================== RESPIRATORY======================  Acute hypoxemic respiratory failure s/p #8 shiley trach on ; decannulated on ; Reintubated on ; trach size 6 DCT cuff on 10/4   Continue close monitoring of respiratory rate and breathing pattern, following of ABG’s with [place] monitoring, continuous pulse oximetry monitoring.   Tolerating TC since 10/19, continue TC as tolerated   Guaifenesin PO PRN mucous plugging   DNR/ no vent    guaiFENesin Oral Liquid (Sugar-Free) 200 milliGRAM(s) Oral every 4 hours PRN mucous plugging  ====================CARDIOVASCULAR==================  Stage D Nonischemic Cardiomyopathy, Status Post HM2 on 2017; LVAD settings 9200, flow 5.0  TTE 10/4:  Severely decreased LV systolic function, Diffuse hypokinesis. Mild AR. No pericardial effusion   VT resolved, continue rate control w/ Lopressor   ASA/Plavix for recent SMA stent on hold due to drop in h/h and +occult feces on 10/24    metoprolol tartrate 12.5 milliGRAM(s) Oral every 12 hours  urea Oral Powder 15 Gram(s) Oral daily  ===================HEMATOLOGIC/ONC ===================  CTA A/P on  +L RP hematoma;   Acute blood loss anemia, monitor H&H/Plts   LUE US on 10/20 with no pseudoaneurysm  ===================== RENAL =========================  Hx of CKD, continue monitoring urine output, I&OS, BUN/Cr   Replete lytes PRN. Keep K> 4 and Mg >2  Hyponatremia  will put pt on standing magnesium    ==================== GASTROINTESTINAL===================  CT A/P 10/5 neg for intra-abd abcess, severe stenosis of prox SMA and b/l iliac arteries  CTA A/P : Evaluation of the mesenteric vessels is limited by streak artifact from LVAD. There appears to be severe stenosis of the proximal SMA; abdominal mesenteric doppler is recommended for further evaluation. 2.  No small bowel findings to suggest acute mesenteric ischemia. 3.  Focal dissections involving the right and left common iliac arteries.  Stenosis of proximal SMA, s/p failed SMA stent c/b RP bleed on 9/10 - SMA stent with Vascular 10/18   Simethicone PRN for gas   Reglan for gut motility  Zofran for nausea   +occult feces on 10/24   RUQ U/S findings of pericholecystic fluid despite perc sybil drain in place as well as ascites, will follow-up with GI regarding potential contrast study  Tolerating tube feeds, Vital @ 50cc/hr. Passed FEES 10/29, now able to supplement NGT feedings with PO pureed diet with mildly thickened liquids when sitting upright OOBTC  Continue Protonix for stress ulcer prophylaxis.     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   BCx 10/14 and SCX on 10/14 +Stenotrophomonas maltophilia s/p Bactrim   Empiric coverage with Ciprofloxacin for chronic suppression, PO Vanco for C. Diff ppx  Will discuss with Gen Sx RUQ U/S findings of pericholecystic fluid despite perc sybil drain in place as well as ascites    ciprofloxacin     Tablet 500 milliGRAM(s) Oral every 12 hours  vancomycin    Solution 125 milliGRAM(s) Oral every 12 hours      Patient requires continuous monitoring with bedside rhythm monitoring, pulse ox monitoring, and intermittent blood gas analysis. Care plan discussed with ICU care team. Patient remained critical and at risk for life threatening decompensation.     By signing my name below, IJanina Tiffany, attest that this documentation has been prepared under the direction and in the presence of CLAUDIA Mejia   Electronically signed: Emily Roa Scribe, 21 @ 08:23    I, CLAUDIA Mejia  , personally performed the services described in this documentation. all medical record entries made by the romel were at my direction and in my presence. I have reviewed the chart and agree that the record reflects my personal performance and is accurate and complete  Electronically signed: CLAUDIA Mejia        RICKY HAMPTON  MRN-[id number]  Patient is a 66y old  Male who presents with a chief complaint of Anemia, Supratherapeutic INR, Dark Stools (2021 07:27)    HPI:  65M PMH ACC/AHA stage D HF due to NICM HM2 LVAD , TV annuloplasty ring 17 as destination therapy due to severe peripheral artery disease with significant stenosis  SIADH, Depression, CKD-3 with hyperkalemia, past E. coli UTIs, driveline drainage (21) and COVID-19 (back in 2020)  He was recently seen in clinic where he complained of abdominal pain and dark stools w constipation back in May. He presents to [facility] ER today weakness and fatigue, moderate and + Black stools for three days, on coumadin secondary to warfarin use in the setting of an LVAD. Patient has required transfusions for GIB in the past. Mostly recently back in 2021 pt had anemia with dark stools. No interventions was done at that time. However Last Endoscopy was done in 2020 (negative). Today labs show patient is anemic with H/H of 4.5/16.3,. INR is 8.84 MAP in the 90s, Temp 35.1. He denies any chest pain, shortness of breath, dizziness, abd pain, nausea or vomiting.     (2021 16:57)    Surgery/Hospital Course:   admit for melena w/ anemia, INR 8.84   6/15 Capsul study (+) for small bowel bleed, balloon endoscopy (old blood in prox ileum); post EGD - septic w/ L opacity, re-intubated for concern for aspiration, TTE (Mod MR, decrease biV w/ interventricular septum boweing towards R)   bronch    +C Diff    CT C/A/P: Fluid filled colon which may be 2/2 rapid transit. Small bilateral pleffs with associates. Compressive atelectasis New ISABELLE & LLL  parenchymal opacities, suspicious for pneumonia. Moderate stenosis in the proximal superior mesenteric artery.    #8 Shiley trach at bedside    LVAD speed increased to 9200   Bronch   TC since . Patient transferred to SDU.    INR today 2.64.  H&H 7.3/24 this AM.  Will repeat CBC at noon, and will send stool guaiac Patient with persistent abdominal tenderness, rate of tube feeds decreased.  No nausea/vomiting.     INR today 2.4. H&H 9.1/28.6 low flow overnight /N&V, refusing Tube feeds on D5 1/2 normal  @50 cc/hr   INR 2.69  H&H 7.7/.1 refusing Tube feeds on D5 1/2 normal  @50 cc/hr. This am + BM Melena Dr Oneill HF  aware- PRBC x1  GI team consulted -  NPO  plan on study in am-  D/w Dr Cadet Patient  to return to CTU for further management; 1PRBCS    Post op INR 2.2 today.  No bleeding. BC + for SM.  Pt is hypotensive requiring pressor and inotropic support.  ID follow up today on Cefepime will follow.   R PTC for PTX    CT C/A/P: sub q emphysema in R chest wall, GGO RUL, small ascites CTH negative; Abd US: GB thickening, pericholecystic fluid     Perchole drain in place continues to drain total output overnight 133.  Fever today 38.8    duplex LE negative    Patient with persistent abdominal pain, refusing tube feeds and medications, Psych consulted   CTA A/P ordered to r/o mesenteric ischemia 2/2 persistent anorexia, nausea, vomiting. Revealed:  Evaluation of the mesenteric vessels is limited by streak artifact from LVAD. There appears to be severe stenosis of the proximal SMA; abdominal mesenteric doppler is recommended for further evaluation. 2.  No small bowel findings to suggest acute mesenteric ischemia. 3.  Focal dissections involving the right and left common iliac arteries.  8/15: Cultures resulted BC 1/2 +GPC in clusters, SC enterobacter; mesenteric duplex: borderline stenosis of proximal SMA  : CT C/A/P noncon: Nondular opacities in R lung apex w/cavitation, abd nl  :  Continue current care, treatment of thyrotoxicosis with medications as per endocrine, d/c ABX as per team.    RUQ sono: Contracted gallbladder with cholecystostomy tube in place.  9/10 failed SMA stent, c/b RP bleed s/p 3U PRCB   CTA Abd/Pelvis L RP hematoma    Trach decannulated    intubated fro resp distress for increased WOB, LL pneumonia; CT C/A/P: progressive ISABELLE opacities and L consolidations, multifocal pneumonia    TTE (EF 25%, decreased RV, mod MR)   10/3 VT -> Lidocaine gtt   10/4 Trach size 6 DCT cuff  10/5 CT abd (neg for intra-abd abcess, severe stenosis of prox SMA and b/l iliac arteries)  10/18 SMA Stent   10/23 Emend for nausea   10/24 +Occult    Today:  No issues with vomiting overnight or today. Continued trach collar    REVIEW OF SYSTEMS:  Speak over trach   Gen: No fever  EYES/ENT: No visual changes;  No vertigo or throat pain   NECK: No pain   RES:  Some SOB briefly, otherwise no Cough  CARD: No chest pain   GI: +intermittently c/o abd pain and nausea   : No dysuria  NEURO: No weakness  SKIN: No itching, rashes     ICU Vital Signs Last 24 Hrs  T(C): 35.6 (2021 04:00), Max: 36.9 (2021 16:00)  T(F): 96 (2021 04:00), Max: 98.5 (2021 16:00)  HR: 94 (2021 07:00) (94 - 116)  BP(mean): 84 (2021 06:00) (78 - 86)  RR: 10 (2021 07:00) (10 - 26)  SpO2: 100% (2021 07:00) (100% - 100%)    Physical Exam:  Gen:  NAD  CNS: intermittently agitated but responds to commands  Neck: no JVD, +trach, +central line   RES : coarse breath sounds, no wheezing    CVS: +LVAD hum   Abd: Soft. Sybil drain with bilious fluid. Positive BS throughout. Mild RUQ abdominal tenderness by perc sybil.  Skin: No rash, erythema, cyanosis.  Vasc: Warm and well-perfused.  Ext:  no edema    ============================I/O===========================   I&O's Detail    2021 07:01  -  2021 07:00  --------------------------------------------------------  IN:    Enteral Tube Flush: 180 mL    IV PiggyBack: 50 mL    Miscellaneous Tube Feedin mL    Oral Fluid: 354 mL  Total IN: 1784 mL    OUT:    Drain (mL): 300 mL    Voided (mL): 775 mL  Total OUT: 1075 mL    Total NET: 709 mL    2021 07:01  -  2021 08:23  --------------------------------------------------------  IN:    Miscellaneous Tube Feedin mL  Total IN: 50 mL    OUT:  Total OUT: 0 mL    Total NET: 50 mL    ============================ LABS =========================                        9.0    9.24  )-----------( 335      ( 2021 01:09 )             27.9     133<L>  |  96  |  26<H>  ----------------------------<  123<H>  4.0   |  26  |  0.50    Ca    9.7      2021 01:10  Phos  2.9     11-  Mg     1.5     -    TPro  7.7  /  Alb  3.8  /  TBili  0.4  /  DBili  x   /  AST  44<H>  /  ALT  89<H>  /  AlkPhos  210<H>      LIVER FUNCTIONS - ( 2021 01:10 )  Alb: 3.8 g/dL / Pro: 7.7 g/dL / ALK PHOS: 210 U/L / ALT: 89 U/L / AST: 44 U/L / GGT: x           ======================Micro/Rad/Cardio=================  Culture: Reviewed   CXR: Reviewed  Echo: Reviewed    ======================================================  PAST MEDICAL & SURGICAL HISTORY:  CHF (congestive heart failure)    CAD (coronary artery disease)    Depression    Pleural effusion    History of  novel coronavirus disease (COVID-19)  2020    Hemorrhoids    Bleeding hemorrhoids    Peripheral arterial disease    Claudication    BPH with urinary obstruction    ACC/AHA stage D systolic heart failure    Anticoagulation goal of INR 2.0 to 2.5    Falls    Clavicle fracture    CKD (chronic kidney disease), stage III    Iron deficiency anemia    H/O epistaxis    Vertigo    GI bleed    S/P TVR (tricuspid valve repair)    S/P ventricular assist device    S/P endoscopy    ====================ASSESSMENT ==============  Stage D Nonischemic Cardiomyopathy, Status Post HM2 on 2017    Cardiogenic shock  Hemodynamic instability   Acute hypoxemic respiratory failure s/p trach , decannulated on ; Reintubated on    GI bleed , Status Post Enteroscopy   Anemia, in setting of melena   Chronic Kidney Disease  Stress hyperglycemia   C.diff positive on    Hypovolemic shock  Septic shock  Leukocytosis  GB thickening/percholecystic s/p perc sybil by IR   SMA stenosis s/p SMA Stent on 10/18   Serratia/citrobacter pneumonia   Stenotrophomonas pneumonia   Enterobacter pneumonia   Nasuea/vomiting  Deconditioning  Hyponatremia  DNR / No vent    Plan:  ====================== NEUROLOGY=====================  Intermittently agitated  OOB, ambulate as tolerated  Continue close monitoring of neuro status   C/w sertraline, mirtazapine for depression   PRN Fentanyl for pain     fentaNYL    Injectable 12 MICROGram(s) IV Push every 3 hours PRN Moderate to severe pain  mirtazapine 7.5 milliGRAM(s) Oral at bedtime  sertraline 100 milliGRAM(s) Oral daily    ==================== RESPIRATORY======================  Acute hypoxemic respiratory failure s/p #8 shiley trach on ; decannulated on ; Reintubated on ; trach size 6 DCT cuff on 10/4   Continue close monitoring of respiratory rate and breathing pattern, following of ABG’s with Janet monitoring, continuous pulse oximetry monitoring.   Tolerating TC since 10/19, continue TC as tolerated   Guaifenesin PO PRN mucous plugging   DNR/ no vent    guaiFENesin Oral Liquid (Sugar-Free) 200 milliGRAM(s) Oral every 4 hours PRN mucous plugging    ====================CARDIOVASCULAR==================  Stage D Nonischemic Cardiomyopathy, Status Post HM2 on 2017; LVAD settings 9200, flow 5.0  TTE 10/4:  Severely decreased LV systolic function, Diffuse hypokinesis. Mild AR. No pericardial effusion   VT resolved, continue rate control w/ Lopressor   ASA/Plavix for recent SMA stent on hold due to drop in h/h and +occult feces on 10/24    metoprolol tartrate 12.5 milliGRAM(s) Oral every 12 hours  urea Oral Powder 15 Gram(s) Oral daily    ===================HEMATOLOGIC/ONC ===================  CTA A/P on  +L RP hematoma;   Acute blood loss anemia, monitor H&H/Plts   LUE US on 10/20 with no pseudoaneurysm    ===================== RENAL =========================  Hx of CKD, continue monitoring urine output, I&OS, BUN/Cr   Replete lytes PRN. Keep K> 4 and Mg >2  Hyponatremia  will put pt on standing magnesium    ==================== GASTROINTESTINAL===================  CT A/P 10/5 neg for intra-abd abcess, severe stenosis of prox SMA and b/l iliac arteries  CTA A/P : Evaluation of the mesenteric vessels is limited by streak artifact from LVAD. There appears to be severe stenosis of the proximal SMA; abdominal mesenteric doppler is recommended for further evaluation. 2.  No small bowel findings to suggest acute mesenteric ischemia. 3.  Focal dissections involving the right and left common iliac arteries.  Stenosis of proximal SMA, s/p failed SMA stent c/b RP bleed on 9/10 - SMA stent with Vascular 10/18   Simethicone PRN for gas   Reglan for gut motility  Zofran for nausea   +occult feces on 10/24   RUQ U/S findings of pericholecystic fluid despite perc sybil drain in place as well as ascites, will follow-up with GI regarding potential contrast study  Tolerating tube feeds, Vital @ 50cc/hr. Passed FEES 10/29, now able to supplement NGT feedings with PO pureed diet with mildly thickened liquids when sitting upright OOBTC  Continue Protonix for stress ulcer prophylaxis.     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   BCx 10/14 and SCX on 10/14 +Stenotrophomonas maltophilia s/p Bactrim   Empiric coverage with Ciprofloxacin for chronic suppression, PO Vanco for C. Diff ppx  Will discuss with Gen Sx RUQ U/S findings of pericholecystic fluid despite perc sybil drain in place as well as ascites    ciprofloxacin     Tablet 500 milliGRAM(s) Oral every 12 hours  vancomycin    Solution 125 milliGRAM(s) Oral every 12 hours    I have spent 30 minutes of noncontinuous critical care time with this patient.    Patient requires continuous monitoring with bedside rhythm monitoring, pulse ox monitoring, and intermittent blood gas analysis. Care plan discussed with ICU care team. Patient remained critical and at risk for life threatening decompensation.     By signing my name below, I, Emily Roa, attest that this documentation has been prepared under the direction and in the presence of CLAUDIA Mejia   Electronically signed: Emily Roa Scribe, 21 @ 08:23    IGo PA  , personally performed the services described in this documentation. all medical record entries made by the romel were at my direction and in my presence. I have reviewed the chart and agree that the record reflects my personal performance and is accurate and complete  Electronically signed: CALUDIA Mejia

## 2021-11-02 NOTE — PROGRESS NOTE ADULT - PROBLEM SELECTOR PLAN 2
-Pt now at physiologic dosing of steroids so no need to test BS at this point.  -discussed with CTICU PA and RN.  Sally Beatty MD  Endocrinology Attending  Mondays and Tuesdays 9am-6pm: [phone] (pager)  Other days, night and weekend: [phone]

## 2021-11-02 NOTE — PROGRESS NOTE ADULT - SUBJECTIVE AND OBJECTIVE BOX
RICKY JOINT  MRN#: [id number]  Subjective:  Pulmonary progress  : recurrent Acute hypoxic respiratory Failure ,aspiration pneumonia, NICM  ,   65M PMH ACC/AHA stage D HF due to NICM HM2 LVAD , TV annuloplasty ring 17 as destination therapy due to severe peripheral artery disease with significant stenosis  SIADH, Depression, CKD-3 with hyperkalemia, past E. coli UTIs, driveline drainage (21) and COVID-19 (back in 2020)  He was recently seen in clinic where he complained of abdominal pain and dark stools w constipation back in May. He presents to [facility] ER today weakness and fatigue, moderate and + Black stools for three days, on coumadin secondary to warfarin use in the setting of an LVAD. Patient has required transfusions for GIB in the past. Mostly recently back in 2021 pt had anemia with dark stools. No interventions was done at that time. However Last Endoscopy was done in 2020 (negative). Today labs show patient is anemic with H/H of 4.5/16.3,. INR is 8.84 MAP in the 90s, Temp 35.1. He denies any chest pain, shortness of breath, dizziness, abd pain, nausea or vomiting. found to have  rectal bleeding underwent endoscopy ,old blood in the proximal ileum ,  develop sepsis with LL opacity given Antibiotics , Extubated , reintubated , Bronchoscopy on Zosyn for LL pneumonia  and Amiodarone S/P TV Annuloplasty , patient remain intubated on full ventilatory support .S/P multiple units of blood transfusion , remain on full ventilatory support on Precedex and propofol , new central IJ line , diarrhea C diff. +ve on po vancomycin and IV Flagyl,  mildly distended belly , fever start on cefepime 2gm q 8 hrs S/P tracheostomy .  new RT Subclavian central line continue on contact  isolation ,S/P  C-diff antibiotics, no more diarrhea back on full support mechanical ventilator , chest x ray show improvement in LLL air space disease, more awake and responsive on tube feeding no more diarrhea ,  no nausea or vomiting or diarrhea still very weak and tired , back on tube feeding ,still on po vancomycin , getting PT and OT at bed side ,   , no SOB getting stronger , improve muscle tone patient transfer to monitor bed still on contact isolation for C-Difficel colitis on 50% FI02, and change to Suresh  tube feeding still loose stool . H/H drop significantly require blood transfusion , most likely GI bleeding , IV heparin D/C ,  H/H is stable ., patient develop TR sided  pneumothorax require chest tube placement , RT IJ central line  placed , develop fever shaking chills , blood culture positive for serratia marcescens , start on cefepime .the patient  become hypoxic and hypotensive placed on full ventilatory support and Vasopressin , levophed and Dobutamine ,S/P blood transfusion on meropenem and vancomycin ,   , on and  off pressors , occasional agitation on Precedex .S/P IR cholecystostomy tube drainage placement in the RT upper Quadrant , resume anticoagulation chest x ray noted C-PAP trail lasted only for 2 hrs , new RT SC line and D/C RT IJ line , RT pig tail cathter has been removed , tolerating C-PAP trial placed on trach. collar 50% FI02 GI consultant noted on NG tube feeding as tolerated , develop AF RVR S/P  bolus Amiodarone  back to regular sinus Rhythm , Flat Affect depressed , back on tube feeding Vital AF at 60 cc/ hr .still intermittent abdominal pain , no fever saturation is accepted  back on full ventilatory support ,   on methimazole for hyperthyroidism ,  condition is the same ,still C/O Abdominal pain , white count is improving , no chest pain or SOB ,  .placed on Reglan 10 mg TID for gastric motility , depressed , withdrawn. , S/P  mesenteric angiogram , unable to stent SMA S/P 3 units of blood transfusion   RT IJ in place reassessed for stent in the SMA by vascular,  dark stool stable H/H ,surgical opinion for possible Lap cholecystectomy. over night events noted develop respiratory distress, , rectal bleeding, require intubation placed on full ventilatory support , FI02 is 50% also became hemianosmically unstable require triple pressor support with levophed , vasopressin and norsynephrine, pan culture placed  on Vancomycin IV and PO  and Zosyn, sedated with propofol kept NPO , new central line RT and left IJ cathter placed . Diflucan Added .fever of 38.5 weaned off  vasopressin ,   fever adding IV vancomycin and  vancomycin solution  , and Bactrim , S/P  tracheostomy , bleeding receiving blood transfusion on vasopressin , no more bleeding , no fever , more awake and responsive ,tolerating tube feeding , ID and heart failure follow up appreciated , depresses no weaning for now , magnesium is low to give supplement too keep Above 2 , patient S/P  vascular procedure for superior mesenteric artery occlusion S/P 2 stent placement , patient tolerate it very well  , left upper extremities hematoma .vascular follow up appreciated , increase WBC , new RT UPE midline , black tarry stool , very loose R/O recurrent GI bleeding and C-dificile  colitis stable H/H no events over night , diarrhea is improved ,  WBC is improving  , hyponatremia no change , S/P FEEST study result is pending , PO feeding with observation . on Cipro antibiotics  no major over night events , worsening LFTs for repeat Abdominal sonogram pending , still diarrhea .     (2021 16:57)    PAST MEDICAL & SURGICAL HISTORY:  CHF (congestive heart failure)    CAD (coronary artery disease)  Depression    Pleural effusion    History of 2019 novel coronavirus disease (COVID-19)  2020    Hemorrhoids    Bleeding hemorrhoids    Peripheral arterial disease    Claudication    BPH with urinary obstruction    ACC/AHA stage D systolic heart failure  Anticoagulation goal of INR 2.0 to 2.5    Falls    Clavicle fracture    CKD (chronic kidney disease), stage III    Iron deficiency anemia    H/O epistaxis    Vertigo    GI bleed    S/P TVR (tricuspid valve repair)    S/P ventricular assist device    S/P endoscopy    OBJECTIVE:  ICU Vital Signs Last 24 Hrs  T(C): 38.2 (2021 10:00), Max: 38.5 (2021 12:00)  T(F): 100.8 (2021 10:00), Max: 101.3 (2021 12:00)  HR: 65 (2021 10:00) (61 - 69)  BP: --  BP(mean): --  ABP: 105/67 (2021 10:00) (90/54 - 113/64)  ABP(mean): 77 (2021 10:00) (63 - 77)  RR: 20 (2021 10:00) (19 - 35)  SpO2: 99% (2021 10:00) (96% - 100%)       @ 07:01  -   @ 07:00  --------------------------------------------------------  IN: 2693.9 mL / OUT: 1415 mL / NET: 1278.9 mL     @ 07:01   @ 10:49  --------------------------------------------------------  IN: 420.8 mL / OUT: 115 mL / NET: 305.8 mL  PHYSICAL EXAM: Daily   Elderly male  on trach. collar  FI02 is 40% S/P tracheostomy   Daily Weight in k.4 (2021 00:00)  HEENT:     + NCAT  + EOMI  - Conjuctival edema   - Icterus   - Thrush   - ETT  + NGT/OGT  Neck:         + FROM  + JVD  - Nodes - Masses + Mid-line trachea + Tracheostomy  Chest:            normal A-P diameter    Lungs:          + CTA   + Rhonchi    - Rales    - Wheezing + Decreased  LT BS   - Dullness R L  Cardiac:       + S1 + S2    + RRR   - Irregular   - S3  - S4    - Murmurs   - Rub   - Hamman’s sign   Abdomen:    + BS  + Soft + Non-tender - Distended - Organomegaly - PEG .cholecystostomy tube in place  Extremities:   - Cyanosis U/L   - Clubbing  U/L  + LE/UE Edema LUE hematoma   + Capillary refill    + Pulses   Neuro:        - Awake   -  Alert   - Confused   - Lethargic   + Sedated  + Generalized Weakness  Skin:        - Rashes    - Erythema   + Normal incisions   + IV sites intact          HOSPITAL MEDICATIONS: All medications reviewed and analyzed  MEDICATIONS  (STANDING):  amiodarone    Tablet 200 milliGRAM(s) Oral daily  chlorhexidine 0.12% Liquid 15 milliLiter(s) Oral Mucosa every 12 hours  chlorhexidine 2% Cloths 1 Application(s) Topical <User Schedule>  dexmedetomidine Infusion 0.5 MICROgram(s)/kG/Hr (9.81 mL/Hr) IV Continuous <Continuous>  dextrose 50% Injectable 50 milliLiter(s) IV Push every 15 minutes  heparin  Infusion 400 Unit(s)/Hr (12.5 mL/Hr) IV Continuous <Continuous>  Hydromorphone  Injectable 0.5 milliGRAM(s) IV Push once  insulin lispro (ADMELOG) corrective regimen sliding scale   SubCutaneous every 6 hours  pantoprazole  Injectable 40 milliGRAM(s) IV Push every 12 hours  piperacillin/tazobactam IVPB.. 3.375 Gram(s) IV Intermittent every 8 hours  propofol Infusion 20 MICROgram(s)/kG/Min (9.42 mL/Hr) IV Continuous <Continuous>  sodium chloride 0.9% lock flush 3 milliLiter(s) IV Push every 8 hours  sodium chloride 0.9%. 1000 milliLiter(s) (10 mL/Hr) IV Continuous <Continuous>    MEDICATIONS  (PRN):  acetaminophen    Suspension .. 650 milliGRAM(s) Oral every 6 hours PRN Temp greater or equal to 38C (100.4F)    LABS: All Lab data reviewed and analyzed                         9.0    9.24  )-----------( 335      ( 2021 01:09 )             27.9   11-    133<L>  |  96  |  26<H>  ----------------------------<  123<H>  4.0   |  26  |  0.50    Ca    9.7      2021 01:10  Phos  2.9     11-  Mg     1.5     11-    TPro  7.7  /  Alb  3.8  /  TBili  0.4  /  DBili  x   /  AST  44<H>  /  ALT  89<H>  /  AlkPhos  210<H>      Ca    9.5      2021 00:29  Phos  2.9     11-  Mg     1.8     -    TPro  7.9  /  Alb  3.6  /  TBili  0.5  /  DBili  x   /  AST  53<H>  /  ALT  102<H>  /  AlkPhos  212<H>                                                                                                                                                                                                       PTT - ( 2021 04:52 )  PTT:45.2 sec LIVER FUNCTIONS - ( 2021 00:42 )  Alb: 3.4 g/dL / Pro: 6.7 g/dL / ALK PHOS: 213 U/L / ALT: 15 U/L / AST: 24 U/L / GGT: x           RADIOLOGY: - Reviewed and analyzed  , LVAD HM2, CT scan of abdomen reviewed result noted

## 2021-11-02 NOTE — PROGRESS NOTE ADULT - PROBLEM SELECTOR PLAN 4
- currently tolerating PO pureed diet along with tube feeds, will continue to advance diet as tolerated

## 2021-11-02 NOTE — PROGRESS NOTE ADULT - ATTENDING COMMENTS
66/M with Stage D HF s/p HM 2 as DT (Severe PAD), abdominal pain 2/2 mysentric ischemia, admitted on 6/2021 for anemia with Hb 4.5 with supratheraputic INR 8.8, with hospital course complicated by resp failure s/p Trach ( retrach on 10/4) weaned off vent, Entero bacter bactremia, serratia/ stenotrophomonas Pneumonia, recurrent GI bleed, s/p SMA stent for abdominal pain, now DNR and Doesn't want to be back on vent in the setting of clinical worsening.     LVAD interrogation: HM 2, Speed 9200, FLow 5's PI: 4-5 and Power 5's, Flows down to 3.3lit w/o speed drop.     Plan:  Stage D HF/ HM 2  MAP's in goal, off any vasoactive drips  Off all AC due to recurrent Bleeding  LDH stable     GI bleeding  off AC as above  Hb stable  Cont monitor    Resp failure:  On trach collar,  cont wean as tolerated and possible decannulation  use PMV as tolerated    Sepsis, Enterobacter bactremia, Serratia PNA  recent Resp cx with  stenotrophomonas light growth, likley colonization.  cont cipro  repeat cultures as needed     SMA stenting, off antiplatelets due to recurrent GIB,   will d/w vascular regarding antiplatelets.     Cholecystitis - achalculous, s/p perc drain placement    Hyperthyroidism  cont methimazole and steroid janeth    Hyponatremia  nehro recs appreciated  improved       Diet:  cont tube feed at goal  cont Puree diet  aspiration precaution    OOB to chair, PT/OT  transfer to Step down .

## 2021-11-02 NOTE — PROGRESS NOTE ADULT - SUBJECTIVE AND OBJECTIVE BOX
Chief Complaint/Follow-up on:     Subjective:    MEDICATIONS  (STANDING):  chlorhexidine 2% Cloths 1 Application(s) Topical <User Schedule>  ciprofloxacin     Tablet 500 milliGRAM(s) Oral every 12 hours  insulin lispro (ADMELOG) corrective regimen sliding scale   SubCutaneous every 6 hours  magnesium oxide 400 milliGRAM(s) Oral every 12 hours  magnesium sulfate  IVPB 2 Gram(s) IV Intermittent daily  methimazole 15 milliGRAM(s) Oral daily  metoclopramide Injectable 10 milliGRAM(s) IV Push every 8 hours  metoprolol tartrate 12.5 milliGRAM(s) Oral every 12 hours  mirtazapine 7.5 milliGRAM(s) Oral at bedtime  multivitamin 1 Tablet(s) Oral daily  ondansetron Injectable 8 milliGRAM(s) IV Push every 8 hours  pantoprazole  Injectable 40 milliGRAM(s) IV Push every 12 hours  predniSONE   Tablet 2 milliGRAM(s) Oral daily  sertraline 100 milliGRAM(s) Oral daily  thiamine 100 milliGRAM(s) Oral daily  urea Oral Powder 15 Gram(s) Oral daily  vancomycin    Solution 125 milliGRAM(s) Oral every 12 hours    MEDICATIONS  (PRN):  fentaNYL    Injectable 12 MICROGram(s) IV Push every 3 hours PRN Moderate to severe pain  guaiFENesin Oral Liquid (Sugar-Free) 200 milliGRAM(s) Oral every 4 hours PRN mucous plugging  simethicone 80 milliGRAM(s) Chew every 8 hours PRN Gas      PHYSICAL EXAM:  VITALS: T(C): 36.1 (11-02-21 @ 12:00)  T(F): 97 (11-02-21 @ 12:00), Max: 98.5 (11-01-21 @ 16:00)  HR: 115 (11-02-21 @ 14:52) (80 - 115)  BP: --  RR:  (10 - 26)  SpO2:  (100% - 100%)  Wt(kg): --  GENERAL: NAD, well-groomed, well-developed  EYES: No proptosis, no injection  HEENT:  Atraumatic, Normocephalic, moist mucous membranes  THYROID: Normal size, no palpable nodules  RESPIRATORY: Clear to auscultation bilaterally; No rales, rhonchi, wheezing, or rubs  CARDIOVASCULAR: Regular rate and rhythm; No murmurs; no peripheral edema  GI: Soft, nontender, non distended, normal bowel sounds  CUSHING'S SIGNS: no striae    POCT Blood Glucose.: 181 mg/dL (11-02-21 @ 12:01)  POCT Blood Glucose.: 144 mg/dL (11-02-21 @ 06:24)  POCT Blood Glucose.: 141 mg/dL (11-02-21 @ 00:54)  POCT Blood Glucose.: 66 mg/dL (11-02-21 @ 00:51)  POCT Blood Glucose.: 151 mg/dL (11-01-21 @ 17:49)  POCT Blood Glucose.: 155 mg/dL (11-01-21 @ 11:20)  POCT Blood Glucose.: 124 mg/dL (11-01-21 @ 05:32)  POCT Blood Glucose.: 122 mg/dL (11-01-21 @ 00:03)  POCT Blood Glucose.: 126 mg/dL (10-31-21 @ 17:06)  POCT Blood Glucose.: 122 mg/dL (10-31-21 @ 12:41)  POCT Blood Glucose.: 131 mg/dL (10-31-21 @ 06:19)  POCT Blood Glucose.: 110 mg/dL (10-31-21 @ 00:29)  POCT Blood Glucose.: 143 mg/dL (10-30-21 @ 18:21)    11-02    133<L>  |  96  |  26<H>  ----------------------------<  123<H>  4.0   |  26  |  0.50    EGFR if : 132  EGFR if non : 114    Ca    9.7      11-02  Mg     1.5     11-02  Phos  2.9     11-02    TPro  7.7  /  Alb  3.8  /  TBili  0.4  /  DBili  x   /  AST  44<H>  /  ALT  89<H>  /  AlkPhos  210<H>  11-02          Thyroid Function Tests:  11-02 @ 02:04 TSH -- FreeT4 1.4 T3 -- Anti TPO -- Anti Thyroglobulin Ab -- TSI --  10-25 @ 03:20 TSH -- FreeT4 1.2 T3 -- Anti TPO -- Anti Thyroglobulin Ab -- TSI --                           Chief Complaint/Follow-up on: hyperthyroidism    Subjective: Patient sitting in the chair. He is unable to walk. He denies any cp or sob.    MEDICATIONS  (STANDING):  chlorhexidine 2% Cloths 1 Application(s) Topical <User Schedule>  ciprofloxacin     Tablet 500 milliGRAM(s) Oral every 12 hours  insulin lispro (ADMELOG) corrective regimen sliding scale   SubCutaneous every 6 hours  magnesium oxide 400 milliGRAM(s) Oral every 12 hours  magnesium sulfate  IVPB 2 Gram(s) IV Intermittent daily  methimazole 15 milliGRAM(s) Oral daily  metoclopramide Injectable 10 milliGRAM(s) IV Push every 8 hours  metoprolol tartrate 12.5 milliGRAM(s) Oral every 12 hours  mirtazapine 7.5 milliGRAM(s) Oral at bedtime  multivitamin 1 Tablet(s) Oral daily  ondansetron Injectable 8 milliGRAM(s) IV Push every 8 hours  pantoprazole  Injectable 40 milliGRAM(s) IV Push every 12 hours  predniSONE   Tablet 2 milliGRAM(s) Oral daily  sertraline 100 milliGRAM(s) Oral daily  thiamine 100 milliGRAM(s) Oral daily  urea Oral Powder 15 Gram(s) Oral daily  vancomycin    Solution 125 milliGRAM(s) Oral every 12 hours    MEDICATIONS  (PRN):  fentaNYL    Injectable 12 MICROGram(s) IV Push every 3 hours PRN Moderate to severe pain  guaiFENesin Oral Liquid (Sugar-Free) 200 milliGRAM(s) Oral every 4 hours PRN mucous plugging  simethicone 80 milliGRAM(s) Chew every 8 hours PRN Gas      PHYSICAL EXAM:  VITALS: T(C): 36.1 (11-02-21 @ 12:00)  T(F): 97 (11-02-21 @ 12:00), Max: 98.5 (11-01-21 @ 16:00)  HR: 115 (11-02-21 @ 14:52) (80 - 115)  BP: --  RR:  (10 - 26)  SpO2:  (100% - 100%)  Wt(kg): --  GENERAL: NAD, well-groomed, well-developed  EYES: No proptosis, no injection  HEENT:  Atraumatic, Normocephalic, moist mucous membranes, +NGT, +trach  RESPIRATORY: Clear to auscultation bilaterally; No rales, rhonchi, wheezing, or rubs  CARDIOVASCULAR: Regular rate and rhythm; No murmurs      POCT Blood Glucose.: 181 mg/dL (11-02-21 @ 12:01)  POCT Blood Glucose.: 144 mg/dL (11-02-21 @ 06:24)  POCT Blood Glucose.: 141 mg/dL (11-02-21 @ 00:54)  POCT Blood Glucose.: 66 mg/dL (11-02-21 @ 00:51)  POCT Blood Glucose.: 151 mg/dL (11-01-21 @ 17:49)  POCT Blood Glucose.: 155 mg/dL (11-01-21 @ 11:20)  POCT Blood Glucose.: 124 mg/dL (11-01-21 @ 05:32)  POCT Blood Glucose.: 122 mg/dL (11-01-21 @ 00:03)  POCT Blood Glucose.: 126 mg/dL (10-31-21 @ 17:06)  POCT Blood Glucose.: 122 mg/dL (10-31-21 @ 12:41)  POCT Blood Glucose.: 131 mg/dL (10-31-21 @ 06:19)  POCT Blood Glucose.: 110 mg/dL (10-31-21 @ 00:29)  POCT Blood Glucose.: 143 mg/dL (10-30-21 @ 18:21)    11-02    133<L>  |  96  |  26<H>  ----------------------------<  123<H>  4.0   |  26  |  0.50    EGFR if : 132  EGFR if non : 114    Ca    9.7      11-02  Mg     1.5     11-02  Phos  2.9     11-02    TPro  7.7  /  Alb  3.8  /  TBili  0.4  /  DBili  x   /  AST  44<H>  /  ALT  89<H>  /  AlkPhos  210<H>  11-02          Thyroid Function Tests:  11-02 @ 02:04 FreeT4 1.4   10-25 @ 03:20  FreeT4 1.2

## 2021-11-03 NOTE — PROGRESS NOTE ADULT - ATTENDING COMMENTS
66/M with Stage D HF s/p HM 2 as DT (Severe PAD), abdominal pain 2/2 mysentric ischemia, admitted on 6/2021 for anemia with Hb 4.5 with supratheraputic INR 8.8, with hospital course complicated by resp failure s/p Trach ( retrach on 10/4) weaned off vent, Entero bacter bactremia, serratia/ stenotrophomonas Pneumonia, recurrent GI bleed, s/p SMA stent for abdominal pain, now DNR and Doesn't want to be back on vent in the setting of clinical worsening.     LVAD interrogation: HM 2, Speed 9200, FLow 5'sPI, Power stable, no significant alarms noted.    Plan:  Stage D HF/ HM 2  MAP's in goal,  Off all AC due to recurrent Bleeding  LDH stable   cont metoprolol 12.5mg BID    GI bleeding  off AC as above  Hb stable  Cont monitor    Resp failure:  On trach collar, shiley 6  cont wean as tolerated and possible decannulation discussed in rounds  Plan to meet with patient and family to discuss Decannulation and patinet wish not to be intubated again if his resp status worsens.   use PMV as tolerated    Sepsis, Enterobacter bactremia, Serratia PNA  recent Resp cx with  stenotrophomonas light growth, austen colonization.  cont cipro  repeat cultures as needed     SMA stenting, off antiplatelets due to recurrent GIB,   will start aspirin given recent stenting   monitor HB. if has recurrent hb drop, will stop aspirin    Cholecystitis - achalculous, s/p perc drain placement    Hyperthyroidism  cont methimazole and steroid janeth    Hyponatremia  nehro recs appreciated  improved     Diet:  cont tube feed at goal  cont Puree diet, calorie count to assess oral intake.  aspiration precaution    OOB to chair, PT/OT  transfer to Step down    family meeting this week to discuss dispo (home vs [facility]), decannulation and goals of care.   DNR, no ventilator support.

## 2021-11-03 NOTE — PROGRESS NOTE ADULT - PROBLEM SELECTOR PLAN 5
- s/p trach on 10/4, currently tolerating trach collar  - will have family meeting today to discuss possibility of decannulation

## 2021-11-03 NOTE — PROGRESS NOTE ADULT - PROBLEM SELECTOR PLAN 2
- Stable w/o evidence of LVAD malfunction, settings as above  - Will be started on ASA 81 mg PO QD as stated below. Remains off Coumadin, h/o recurrent GI bleeding  - LDH elevated but stable   - now DNR

## 2021-11-03 NOTE — PROGRESS NOTE ADULT - SUBJECTIVE AND OBJECTIVE BOX
Behavioral Cardiology Progress Note     History of present illness: Mr. Quispe is a 66 year-old man with history of NICM s/p HM2 on 9/2017 as DT (due to severe PAD) with TV ring, prior COVID-19 infection 4/2020, recurrent syncope post LVAD s/p ILR, and chronic abdominal pain with prior negative workup who was admitted with symptomatic anemia with Hgb 4.5 in setting of INR 8.8 without hemodynamic instability. Testing showed active bleeding in the small bowel but subsequent enteroscopy 6/15 did not reveal any active bleeding. He acutely decompensated after procedure with fever/hypertension, low flow alarms, and pulmonary infiltrate with hypoxia requiring intubation from probable aspiration PNA.  Course notable for inability to wean ventilator with persistent secretions for which he underwent tracheostomy as well as acute c diff colitis.     Social history: , lives in his sister’s house in [place]. Has 3 sons (2 live in , 1 in [facility]). One of 3 siblings. Lives in his sister’s house in [place] (Cristina Rudolph [phone]), also in the home are niece ([facility] [phone]) and nephew (Miller Rudolph).  Another sister lives close by in [place] and all other siblings live in [facility] which the family visit often.  Worked full time at [employer] in [place], stopped working due to heart disease. Education: high school graduate.     Substance use:   Tobacco: Former smoker, 8-10 cigarettes/day for 30 years.   Alcohol: Past use of 3-4 drinks of Johnson 2x/week. None for past 4 years.   Drug: Denies any drug use.

## 2021-11-03 NOTE — PROGRESS NOTE ADULT - PROBLEM SELECTOR PLAN 1
- ACC/AHA stage D systolic heart failure s/p LVAD   - Currently on Lopressor 12.5 mg PO BID, (MAP goal 70-80).

## 2021-11-03 NOTE — PROGRESS NOTE ADULT - SUBJECTIVE AND OBJECTIVE BOX
Subjective: Patient seen and examined resting in bed.     Medications:  aspirin  chewable 81 milliGRAM(s) Oral daily  chlorhexidine 2% Cloths 1 Application(s) Topical <User Schedule>  ciprofloxacin     Tablet 500 milliGRAM(s) Oral every 12 hours  guaiFENesin Oral Liquid (Sugar-Free) 200 milliGRAM(s) Oral every 4 hours PRN  insulin lispro (ADMELOG) corrective regimen sliding scale   SubCutaneous every 6 hours  magnesium oxide 400 milliGRAM(s) Oral every 12 hours  magnesium sulfate  IVPB 2 Gram(s) IV Intermittent daily  methimazole 10 milliGRAM(s) Oral daily  metoclopramide Injectable 10 milliGRAM(s) IV Push every 8 hours  metoprolol tartrate 12.5 milliGRAM(s) Oral every 8 hours  mirtazapine 7.5 milliGRAM(s) Oral at bedtime  multivitamin 1 Tablet(s) Oral daily  ondansetron Injectable 8 milliGRAM(s) IV Push every 8 hours  pantoprazole  Injectable 40 milliGRAM(s) IV Push every 12 hours  sertraline 100 milliGRAM(s) Oral daily  simethicone 80 milliGRAM(s) Chew every 8 hours PRN  thiamine 100 milliGRAM(s) Oral daily  urea Oral Powder 15 Gram(s) Oral daily  vancomycin    Solution 125 milliGRAM(s) Oral every 12 hours      ICU Vital Signs Last 24 Hrs  T(C): 36.5 (03 Nov 2021 08:00), Max: 36.5 (03 Nov 2021 08:00)  T(F): 97.7 (03 Nov 2021 08:00), Max: 97.7 (03 Nov 2021 08:00)  HR: 98 (03 Nov 2021 12:00) (91 - 115)  BP: --  BP(mean): 92 (03 Nov 2021 08:00) (86 - 92)  ABP: --  ABP(mean): --  RR: 23 (03 Nov 2021 12:00) (14 - 27)  SpO2: 100% (03 Nov 2021 12:00) (100% - 100%)          I&O's Summary    02 Nov 2021 07:01  -  03 Nov 2021 07:00  --------------------------------------------------------  IN: 1800 mL / OUT: 550 mL / NET: 1250 mL    03 Nov 2021 07:01  -  03 Nov 2021 12:48  --------------------------------------------------------  IN: 532 mL / OUT: 200 mL / NET: 332 mL        Physical Exam  General: resting in chair, thin appearing   Neck: +trach  Chest: Clear to auscultation bilaterally  CV: +VAD hum  Abdomen: Soft, non-distended, non-tender, +keotube, +bilary drain   Neurology: Alert and oriented times three. Sensation intact  Psych: Affect normal    LVAD Interrogation  VAD TYPE HM 2  Speed 9200  Flow 4.1  Power 5.1  PI  6.3  Assessment of driveline exit site: driveline dressing c/d/i  Programming changes: no changes made    Labs:                        9.0    8.62  )-----------( 291      ( 03 Nov 2021 00:45 )             28.6     11-03    133<L>  |  97  |  25<H>  ----------------------------<  113<H>  4.2   |  26  |  0.49<L>    Ca    9.4      03 Nov 2021 00:44  Phos  2.5     11-03  Mg     2.0     11-03    TPro  7.8  /  Alb  3.6  /  TBili  0.4  /  DBili  x   /  AST  37  /  ALT  73<H>  /  AlkPhos  210<H>  11-03              Lactate Dehydrogenase, Serum: 277 U/L (11-03 @ 00:44)  Lactate Dehydrogenase, Serum: 246 U/L (11-02 @ 01:10)  Lactate Dehydrogenase, Serum: 288 U/L (11-01 @ 00:29)

## 2021-11-03 NOTE — PROGRESS NOTE ADULT - SUBJECTIVE AND OBJECTIVE BOX
RICKY HAMPTON  MRN-[id number]  Patient is a 66y old  Male who presents with a chief complaint of Anemia, Supratherapeutic INR, Dark Stools (2021 16:09)    HPI:  65M PMH ACC/AHA stage D HF due to NICM HM2 LVAD , TV annuloplasty ring 17 as destination therapy due to severe peripheral artery disease with significant stenosis  SIADH, Depression, CKD-3 with hyperkalemia, past E. coli UTIs, driveline drainage (21) and COVID-19 (back in 2020)  He was recently seen in clinic where he complained of abdominal pain and dark stools w constipation back in May. He presents to [facility] ER today weakness and fatigue, moderate and + Black stools for three days, on coumadin secondary to warfarin use in the setting of an LVAD. Patient has required transfusions for GIB in the past. Mostly recently back in 2021 pt had anemia with dark stools. No interventions was done at that time. However Last Endoscopy was done in 2020 (negative). Today labs show patient is anemic with H/H of 4.5/16.3,. INR is 8.84 MAP in the 90s, Temp 35.1. He denies any chest pain, shortness of breath, dizziness, abd pain, nausea or vomiting.       (2021 16:57)      Surgery/Hospital Course:   admit for melena w/ anemia, INR 8.84   6/15 Capsul study (+) for small bowel bleed, balloon endoscopy (old blood in prox ileum); post EGD - septic w/ L opacity, re-intubated for concern for aspiration, TTE (Mod MR, decrease biV w/ interventricular septum boweing towards R)   bronch    +C Diff    CT C/A/P: Fluid filled colon which may be 2/2 rapid transit. Small bilateral pleffs with associates. Compressive atelectasis New ISABELLE & LLL  parenchymal opacities, suspicious for pneumonia. Moderate stenosis in the proximal superior mesenteric artery.    #8 Shiley trach at bedside    LVAD speed increased to 9200   Bronch   TC since . Patient transferred to SDU.    INR today 2.64.  H&H 7.3/24 this AM.  Will repeat CBC at noon, and will send stool guaiac Patient with persistent abdominal tenderness, rate of tube feeds decreased.  No nausea/vomiting.     INR today 2.4. H&H 9.1/28.6 low flow overnight /N&V, refusing Tube feeds on D5 1/2 normal  @50 cc/hr   INR 2.69  H&H 7.7/.1 refusing Tube feeds on D5 1/2 normal  @50 cc/hr. This am + BM Melena Dr Oneill HF  aware- PRBC x1  GI team consulted -  NPO  plan on study in am-  D/w Dr Cadet Patient  to return to CTU for further management; 1PRBCS    Post op INR 2.2 today.  No bleeding. BC + for SM.  Pt is hypotensive requiring pressor and inotropic support.  ID follow up today on Cefepime will follow.   R PTC for PTX    CT C/A/P: sub q emphysema in R chest wall, GGO RUL, small ascites CTH negative; Abd US: GB thickening, pericholecystic fluid     Perchole drain in place continues to drain total output overnight 133.  Fever today 38.8    duplex LE negative    Patient with persistent abdominal pain, refusing tube feeds and medications, Psych consulted   CTA A/P ordered to r/o mesenteric ischemia 2/2 persistent anorexia, nausea, vomiting. Revealed:  Evaluation of the mesenteric vessels is limited by streak artifact from LVAD. There appears to be severe stenosis of the proximal SMA; abdominal mesenteric doppler is recommended for further evaluation. 2.  No small bowel findings to suggest acute mesenteric ischemia. 3.  Focal dissections involving the right and left common iliac arteries.  8/15: Cultures resulted BC 1/2 +GPC in clusters, SC enterobacter; mesenteric duplex: borderline stenosis of proximal SMA  : CT C/A/P noncon: Nondular opacities in R lung apex w/cavitation, abd nl  :  Continue current care, treatment of thyrotoxicosis with medications as per endocrine, d/c ABX as per team.    RUQ sono: Contracted gallbladder with cholecystostomy tube in place.  9/10 failed SMA stent, c/b RP bleed s/p 3U PRCB   CTA Abd/Pelvis L RP hematoma    Trach decannulated    intubated fro resp distress for increased WOB, LL pneumonia; CT C/A/P: progressive ISABELLE opacities and L consolidations, multifocal pneumonia    TTE (EF 25%, decreased RV, mod MR)   10/3 VT -> Lidocaine gtt   10/4 Trach size 6 DCT cuff  10/5 CT abd (neg for intra-abd abcess, severe stenosis of prox SMA and b/l iliac arteries)  10/18 SMA Stent   10/23 Emend for nausea   10/24 +Occult  10/29 FEES, RUQ ascites and pericholecystic fluid     Today:    REVIEW OF SYSTEMS:  Gen: No fever  EYES/ENT: No visual changes;  No vertigo or throat pain   NECK: No pain   RES:  Some SOB briefly, otherwise no Cough  CARD: No chest pain   GI: +intermittently c/o abd pain and nausea   : No dysuria  NEURO: No weakness  SKIN: No itching, rashes     ICU Vital Signs Last 24 Hrs  T(C): 36.1 (2021 04:00), Max: 36.2 (2021 16:00)  T(F): 97 (2021 04:00), Max: 97.2 (2021 00:00)  HR: 95 (2021 04:00) (95 - 115)  BP: --  BP(mean): 88 (2021 04:00) (86 - 88)  ABP: --  ABP(mean): --  RR: 14 (2021 04:00) (14 - 27)  SpO2: 100% (2021 04:00) (100% - 100%)      Physical Exam:  Gen:  NAD  CNS: intermittently agitated but responds to commands  Neck: no JVD, +trach, +central line   RES : coarse breath sounds, no wheezing    CVS: +LVAD hum   Abd: Soft. Sybil drain with bilious fluid. Positive BS throughout. Mild RUQ abdominal tenderness by perc syibl.  Skin: No rash, erythema, cyanosis.  Vasc: Warm and well-perfused.  Ext:  no edema    ============================I/O===========================   I&O's Detail    2021 07:01  -  2021 07:00  --------------------------------------------------------  IN:    Enteral Tube Flush: 240 mL    Miscellaneous Tube Feedin mL    Oral Fluid: 360 mL  Total IN: 1800 mL    OUT:    Drain (mL): 350 mL    Voided (mL): 200 mL  Total OUT: 550 mL    Total NET: 1250 mL        ============================ LABS =========================                        9.0    8.62  )-----------( 291      ( 2021 00:45 )             28.6     11-    133<L>  |  97  |  25<H>  ----------------------------<  113<H>  4.2   |  26  |  0.49<L>    Ca    9.4      2021 00:44  Phos  2.5       Mg     2.0         TPro  7.8  /  Alb  3.6  /  TBili  0.4  /  DBili  x   /  AST  37  /  ALT  73<H>  /  AlkPhos  210<H>      LIVER FUNCTIONS - ( 2021 00:44 )  Alb: 3.6 g/dL / Pro: 7.8 g/dL / ALK PHOS: 210 U/L / ALT: 73 U/L / AST: 37 U/L / GGT: x                 ======================Micro/Rad/Cardio=================  Culture: Reviewed   CXR: Reviewed  Echo:Reviewed  ======================================================  PAST MEDICAL & SURGICAL HISTORY:  CHF (congestive heart failure)    CAD (coronary artery disease)    Depression    Pleural effusion    History of 2019 novel coronavirus disease (COVID-19)  2020    Hemorrhoids    Bleeding hemorrhoids    Peripheral arterial disease    Claudication    BPH with urinary obstruction    ACC/AHA stage D systolic heart failure    Anticoagulation goal of INR 2.0 to 2.5    Falls    Clavicle fracture    CKD (chronic kidney disease), stage III    Iron deficiency anemia    H/O epistaxis    Vertigo    GI bleed    S/P TVR (tricuspid valve repair)    S/P ventricular assist device    S/P endoscopy      ====================ASSESSMENT ==============  Stage D Nonischemic Cardiomyopathy, Status Post HM2 on 2017    Cardiogenic shock  Acute hypoxemic respiratory failure s/p trach , decannulated on ; Reintubated on    GI bleed , Status Post Enteroscopy   Anemia, in setting of melena   Chronic Kidney Disease  C.diff positive on    Hypovolemic shock  Septic shock  GB thickening/percholecystic s/p perc sybil by IR   SMA stenosis s/p SMA Stent on 10/18   Serratia/citrobacter pneumonia   Stenotrophomonas pneumonia   Enterobacter pneumonia   Nasuea/vomiting  Deconditioning  Hyponatremia  Stress hyperglycemia     DNR / No vent    Plan:  ====================== NEUROLOGY=====================  Intermittently agitated, continue close monitoring of neuro status   C/w sertraline, mirtazapine for depression     mirtazapine 7.5 milliGRAM(s) Oral at bedtime  sertraline 100 milliGRAM(s) Oral daily    ==================== RESPIRATORY======================  Acute hypoxemic respiratory failure s/p #8 shiley trach on ; decannulated on ; Reintubated on ; trach size 6 DCT cuff on 10/4   Continue close monitoring of respiratory rate and breathing pattern, following of ABG’s with [place] monitoring, continuous pulse oximetry monitoring.   Tolerating TC since 10/19, continue TC as tolerated   Guaifenesin PO PRN mucous plugging       DNR/ no vent    guaiFENesin Oral Liquid (Sugar-Free) 200 milliGRAM(s) Oral every 4 hours PRN mucous plugging    ====================CARDIOVASCULAR==================  Stage D Nonischemic Cardiomyopathy, Status Post HM2 on 2017; LVAD settings 9200, flow 4.1  TTE 10/4:  Severely decreased LV systolic function, Diffuse hypokinesis. Mild AR. No pericardial effusion   ASA/Plavix for recent SMA stent on hold due to drop in h/h and +occult feces on 10/24  VT resolved, continue rate control w/ Lopressor     metoprolol tartrate 12.5 milliGRAM(s) Oral every 12 hours    ===================HEMATOLOGIC/ONC ===================  CTA A/P on  +L RP hematoma;   Acute blood loss anemia, monitor H&H/Plts   LUE US on 10/20 with no pseudoaneurysm    ===================== RENAL =========================  Hx of CKD, continue monitoring urine output, I&OS, BUN/Cr   Replete lytes PRN. Keep K> 4 and Mg >2  C/w standing magnesium     magnesium sulfate  IVPB 2 Gram(s) IV Intermittent daily    ==================== GASTROINTESTINAL===================  CT A/P 10/5 neg for intra-abd abcess, severe stenosis of prox SMA and b/l iliac arteries  CTA A/P : Evaluation of the mesenteric vessels is limited by streak artifact from LVAD. There appears to be severe stenosis of the proximal SMA; abdominal mesenteric doppler is recommended for further evaluation. 2.  No small bowel findings to suggest acute mesenteric ischemia. 3.  Focal dissections involving the right and left common iliac arteries.  Stenosis of proximal SMA, s/p failed SMA stent c/b RP bleed on 9/10 - SMA stent with Vascular 10/18   +occult feces on 10/24    RUQ U/S findings of pericholecystic fluid despite perc sybil drain in place as well as ascites, will follow-up with GI regarding potential contrast study  Tolerating tube feeds, Vital @ 50cc/hr. Passed FEES 10/29, now able to supplement NGT feedings with PO pureed diet with mildly thickened liquids when sitting upright OOBTC  Simethicone PRN for gas   Reglan for gut motility  Zofran for nausea     multivitamin 1 Tablet(s) Oral daily  GI prophylaxis, pantoprazole  Injectable 40 milliGRAM(s) IV Push every 12 hours  simethicone 80 milliGRAM(s) Chew every 8 hours PRN Gas  thiamine 100 milliGRAM(s) Oral daily  metoclopramide Injectable 10 milliGRAM(s) IV Push every 8 hours  ondansetron Injectable 8 milliGRAM(s) IV Push every 8 hours  urea Oral Powder 15 Gram(s) Oral daily    =======================    ENDOCRINE  =====================  Stress hyperglycemia, continue glucose control with admelog sliding scale     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   BCx 10/14 and SCX on 10/14 +Stenotrophomonas maltophilia s/p Bactrim   Empiric coverage with Ciprofloxacin for chronic suppression, PO Vanco for C. Diff ppx  F/u Gen Sx RUQ U/S findings of pericholecystic fluid despite perc sybil drain in place as well as ascites    ciprofloxacin     Tablet 500 milliGRAM(s) Oral every 12 hours  vancomycin    Solution 125 milliGRAM(s) Oral every 12 hours        Patient requires continuous monitoring with bedside rhythm monitoring, pulse ox monitoring, and intermittent blood gas analysis. Care plan discussed with ICU care team. Patient remained critical and at risk for life threatening decompensation.     By signing my name below, IAgnieszka, attest that this documentation has been prepared under the direction and in the presence of CLAUDIA Burleson   Electronically signed: Romel Shaffer, 21 @ 08:34    I, Anastasiya Moulton, personally performed the services described in this documentation. all medical record entries made by the romel were at my direction and in my presence. I have reviewed the chart and agree that the record reflects my personal performance and is accurate and complete  Electronically signed: CLAUDIA Burleson        RICKY HAMPTON  MRN-[id number]  Patient is a 66y old  Male who presents with a chief complaint of Anemia, Supratherapeutic INR, Dark Stools (2021 16:09)    HPI:  65M PMH ACC/AHA stage D HF due to NICM HM2 LVAD , TV annuloplasty ring 17 as destination therapy due to severe peripheral artery disease with significant stenosis  SIADH, Depression, CKD-3 with hyperkalemia, past E. coli UTIs, driveline drainage (21) and COVID-19 (back in 2020)  He was recently seen in clinic where he complained of abdominal pain and dark stools w constipation back in May. He presents to [facility] ER today weakness and fatigue, moderate and + Black stools for three days, on coumadin secondary to warfarin use in the setting of an LVAD. Patient has required transfusions for GIB in the past. Mostly recently back in 2021 pt had anemia with dark stools. No interventions was done at that time. However Last Endoscopy was done in 2020 (negative). Today labs show patient is anemic with H/H of 4.5/16.3,. INR is 8.84 MAP in the 90s, Temp 35.1. He denies any chest pain, shortness of breath, dizziness, abd pain, nausea or vomiting.       (2021 16:57)      Surgery/Hospital Course:   admit for melena w/ anemia, INR 8.84   6/15 Capsul study (+) for small bowel bleed, balloon endoscopy (old blood in prox ileum); post EGD - septic w/ L opacity, re-intubated for concern for aspiration, TTE (Mod MR, decrease biV w/ interventricular septum boweing towards R)   bronch    +C Diff    CT C/A/P: Fluid filled colon which may be 2/2 rapid transit. Small bilateral pleffs with associates. Compressive atelectasis New ISABELLE & LLL  parenchymal opacities, suspicious for pneumonia. Moderate stenosis in the proximal superior mesenteric artery.    #8 Shiley trach at bedside    LVAD speed increased to 9200   Bronch   TC since . Patient transferred to SDU.    INR today 2.64.  H&H 7.3/24 this AM.  Will repeat CBC at noon, and will send stool guaiac Patient with persistent abdominal tenderness, rate of tube feeds decreased.  No nausea/vomiting.     INR today 2.4. H&H 9.1/28.6 low flow overnight /N&V, refusing Tube feeds on D5 1/2 normal  @50 cc/hr   INR 2.69  H&H 7.7/.1 refusing Tube feeds on D5 1/2 normal  @50 cc/hr. This am + BM Melena Dr Oneill HF  aware- PRBC x1  GI team consulted -  NPO  plan on study in am-  D/w Dr Cadet Patient  to return to CTU for further management; 1PRBCS    Post op INR 2.2 today.  No bleeding. BC + for SM.  Pt is hypotensive requiring pressor and inotropic support.  ID follow up today on Cefepime will follow.   R PTC for PTX    CT C/A/P: sub q emphysema in R chest wall, GGO RUL, small ascites CTH negative; Abd US: GB thickening, pericholecystic fluid     Perchole drain in place continues to drain total output overnight 133.  Fever today 38.8    duplex LE negative    Patient with persistent abdominal pain, refusing tube feeds and medications, Psych consulted   CTA A/P ordered to r/o mesenteric ischemia 2/2 persistent anorexia, nausea, vomiting. Revealed:  Evaluation of the mesenteric vessels is limited by streak artifact from LVAD. There appears to be severe stenosis of the proximal SMA; abdominal mesenteric doppler is recommended for further evaluation. 2.  No small bowel findings to suggest acute mesenteric ischemia. 3.  Focal dissections involving the right and left common iliac arteries.  8/15: Cultures resulted BC 1/2 +GPC in clusters, SC enterobacter; mesenteric duplex: borderline stenosis of proximal SMA  : CT C/A/P noncon: Nondular opacities in R lung apex w/cavitation, abd nl  :  Continue current care, treatment of thyrotoxicosis with medications as per endocrine, d/c ABX as per team.    RUQ sono: Contracted gallbladder with cholecystostomy tube in place.  9/10 failed SMA stent, c/b RP bleed s/p 3U PRCB   CTA Abd/Pelvis L RP hematoma    Trach decannulated    intubated fro resp distress for increased WOB, LL pneumonia; CT C/A/P: progressive ISABELLE opacities and L consolidations, multifocal pneumonia    TTE (EF 25%, decreased RV, mod MR)   10/3 VT -> Lidocaine gtt   10/4 Trach size 6 DCT cuff  10/5 CT abd (neg for intra-abd abcess, severe stenosis of prox SMA and b/l iliac arteries)  10/18 SMA Stent   10/23 Emend for nausea   10/24 +Occult  10/29 FEES, RUQ ascites and pericholecystic fluid     Today:  Tolerating tube feeds, Vital @ 50cc/hr with intermittent supervised supplement NGT feedings with PO pureed diet with mildly thickened liquids while OOBTC, plan for calorie count. Tolerating TC since 10/19, continue TC as tolerated. PT/ambulation as tolerated.     REVIEW OF SYSTEMS:  Gen: No fever  EYES/ENT: No visual changes;  No vertigo or throat pain   NECK: No pain   RES:  Some SOB briefly, otherwise no Cough  CARD: No chest pain   GI: +intermittently c/o abd pain and nausea   : No dysuria  NEURO: No weakness  SKIN: No itching, rashes     ICU Vital Signs Last 24 Hrs  T(C): 36.1 (2021 04:00), Max: 36.2 (2021 16:00)  T(F): 97 (2021 04:00), Max: 97.2 (2021 00:00)  HR: 95 (2021 04:00) (95 - 115)  BP: --  BP(mean): 88 (2021 04:00) (86 - 88)  ABP: --  ABP(mean): --  RR: 14 (2021 04:00) (14 - 27)  SpO2: 100% (2021 04:00) (100% - 100%)      Physical Exam:  Gen:  NAD  CNS: intermittently agitated but responds to commands  Neck: no JVD, +trach, +central line   RES : coarse breath sounds, no wheezing    CVS: +LVAD hum   Abd: Soft. Sybil drain with bilious fluid. Positive BS throughout. Mild RUQ abdominal tenderness by perc sybil.  Skin: No rash, erythema, cyanosis.  Vasc: Warm and well-perfused.  Ext:  no edema    ============================I/O===========================   I&O's Detail    2021 07:01  -  2021 07:00  --------------------------------------------------------  IN:    Enteral Tube Flush: 240 mL    Miscellaneous Tube Feedin mL    Oral Fluid: 360 mL  Total IN: 1800 mL    OUT:    Drain (mL): 350 mL    Voided (mL): 200 mL  Total OUT: 550 mL    Total NET: 1250 mL        ============================ LABS =========================                        9.0    8.62  )-----------( 291      ( 2021 00:45 )             28.6         133<L>  |  97  |  25<H>  ----------------------------<  113<H>  4.2   |  26  |  0.49<L>    Ca    9.4      2021 00:44  Phos  2.5       Mg     2.0         TPro  7.8  /  Alb  3.6  /  TBili  0.4  /  DBili  x   /  AST  37  /  ALT  73<H>  /  AlkPhos  210<H>      LIVER FUNCTIONS - ( 2021 00:44 )  Alb: 3.6 g/dL / Pro: 7.8 g/dL / ALK PHOS: 210 U/L / ALT: 73 U/L / AST: 37 U/L / GGT: x                 ======================Micro/Rad/Cardio=================  Culture: Reviewed   CXR: Reviewed  Echo:Reviewed  ======================================================  PAST MEDICAL & SURGICAL HISTORY:  CHF (congestive heart failure)    CAD (coronary artery disease)    Depression    Pleural effusion    History of  novel coronavirus disease (COVID-19)  2020    Hemorrhoids    Bleeding hemorrhoids    Peripheral arterial disease    Claudication    BPH with urinary obstruction    ACC/AHA stage D systolic heart failure    Anticoagulation goal of INR 2.0 to 2.5    Falls    Clavicle fracture    CKD (chronic kidney disease), stage III    Iron deficiency anemia    H/O epistaxis    Vertigo    GI bleed    S/P TVR (tricuspid valve repair)    S/P ventricular assist device    S/P endoscopy      ====================ASSESSMENT ==============  Stage D Nonischemic Cardiomyopathy, Status Post HM2 on 2017    Cardiogenic shock  Acute hypoxemic respiratory failure s/p trach , decannulated on ; Reintubated on    GI bleed , Status Post Enteroscopy   Anemia, in setting of melena   Chronic Kidney Disease  C.diff positive on    Hypovolemic shock  Septic shock  GB thickening/percholecystic s/p perc sybil by IR   SMA stenosis s/p SMA Stent on 10/18   Serratia/citrobacter pneumonia   Stenotrophomonas pneumonia   Enterobacter pneumonia   Nasuea/vomiting  Deconditioning  Hyponatremia  Stress hyperglycemia     DNR / No vent    Plan:  ====================== NEUROLOGY=====================  Intermittently agitated, continue close monitoring of neuro status   C/w sertraline, mirtazapine for depression     mirtazapine 7.5 milliGRAM(s) Oral at bedtime  sertraline 100 milliGRAM(s) Oral daily    ==================== RESPIRATORY======================  Acute hypoxemic respiratory failure s/p #8 shiley trach on ; decannulated on ; Reintubated on ; trach size 6 DCT cuff on 10/4   Continue close monitoring of respiratory rate and breathing pattern, following of ABG’s with [place] monitoring, continuous pulse oximetry monitoring.   Tolerating TC since 10/19, continue TC as tolerated   Guaifenesin PO PRN mucous plugging     DNR/ no vent    guaiFENesin Oral Liquid (Sugar-Free) 200 milliGRAM(s) Oral every 4 hours PRN mucous plugging    ====================CARDIOVASCULAR==================  Stage D Nonischemic Cardiomyopathy, Status Post HM2 on 2017; LVAD settings 9200, flow 4.1  TTE 10/4:  Severely decreased LV systolic function, Diffuse hypokinesis. Mild AR. No pericardial effusion   ASA/Plavix for recent SMA stent on hold due to drop in h/h and +occult feces on 10/24  VT resolved, continue rate control w/ Lopressor     metoprolol tartrate 12.5 milliGRAM(s) Oral every 12 hours    ===================HEMATOLOGIC/ONC ===================  CTA A/P on  +L RP hematoma;   Acute blood loss anemia, monitor H&H/Plts   LUE US on 10/20 with no pseudoaneurysm    ===================== RENAL =========================  Hx of CKD, continue monitoring urine output, I&OS, BUN/Cr   Replete lytes PRN. Keep K> 4 and Mg >2  C/w standing magnesium     magnesium sulfate  IVPB 2 Gram(s) IV Intermittent daily    ==================== GASTROINTESTINAL===================  CT A/P 10/5 neg for intra-abd abcess, severe stenosis of prox SMA and b/l iliac arteries  CTA A/P : Evaluation of the mesenteric vessels is limited by streak artifact from LVAD. There appears to be severe stenosis of the proximal SMA; abdominal mesenteric doppler is recommended for further evaluation. 2.  No small bowel findings to suggest acute mesenteric ischemia. 3.  Focal dissections involving the right and left common iliac arteries.  Stenosis of proximal SMA, s/p failed SMA stent c/b RP bleed on 9/10 - SMA stent with Vascular 10/18   +occult feces on 10/24    RUQ U/S findings of pericholecystic fluid despite perc sybil drain in place as well as ascites, will follow-up with GI regarding potential contrast study  Tolerating tube feeds, Vital @ 50cc/hr with intermittent supervised supplement NGT feedings with PO pureed diet with mildly thickened liquids while OOBTC, plan for calorie count.   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   BCx 10/14 and SCX on 10/14 +Stenotrophomonas maltophilia s/p Bactrim   Empiric coverage with Ciprofloxacin for chronic suppression, PO Vanco for C. Diff ppx  F/u Gen Sx RUQ U/S findings of pericholecystic fluid despite perc sybil drain in place as well as ascites    ciprofloxacin     Tablet 500 milliGRAM(s) Oral every 12 hours  vancomycin    Solution 125 milliGRAM(s) Oral every 12 hours        Patient requires continuous monitoring with bedside rhythm monitoring, pulse ox monitoring, and intermittent blood gas analysis. Care plan discussed with ICU care team. Patient remained critical and at risk for life threatening decompensation.     By signing my name below, I, Agnieszka Cherry, attest that this documentation has been prepared under the direction and in the presence of CLAUDIA Burleson   Electronically signed: Romel Shaffer, 21 @ 08:34    I, Anastasiya Moulton, personally performed the services described in this documentation. all medical record entries made by the romel were at my direction and in my presence. I have reviewed the chart and agree that the record reflects my personal performance and is accurate and complete  Electronically signed: CLAUDIA Burleson        RICKY HAMPTON  MRN-[id number]  Patient is a 66y old  Male who presents with a chief complaint of Anemia, Supratherapeutic INR, Dark Stools (2021 16:09)    HPI:  65M PMH ACC/AHA stage D HF due to NICM HM2 LVAD , TV annuloplasty ring 17 as destination therapy due to severe peripheral artery disease with significant stenosis  SIADH, Depression, CKD-3 with hyperkalemia, past E. coli UTIs, driveline drainage (21) and COVID-19 (back in 2020)  He was recently seen in clinic where he complained of abdominal pain and dark stools w constipation back in May. He presents to [facility] ER today weakness and fatigue, moderate and + Black stools for three days, on coumadin secondary to warfarin use in the setting of an LVAD. Patient has required transfusions for GIB in the past. Mostly recently back in 2021 pt had anemia with dark stools. No interventions was done at that time. However Last Endoscopy was done in 2020 (negative). Today labs show patient is anemic with H/H of 4.5/16.3,. INR is 8.84 MAP in the 90s, Temp 35.1. He denies any chest pain, shortness of breath, dizziness, abd pain, nausea or vomiting.       (2021 16:57)      Surgery/Hospital Course:   admit for melena w/ anemia, INR 8.84   6/15 Capsul study (+) for small bowel bleed, balloon endoscopy (old blood in prox ileum); post EGD - septic w/ L opacity, re-intubated for concern for aspiration, TTE (Mod MR, decrease biV w/ interventricular septum boweing towards R)   bronch    +C Diff    CT C/A/P: Fluid filled colon which may be 2/2 rapid transit. Small bilateral pleffs with associates. Compressive atelectasis New ISABELLE & LLL  parenchymal opacities, suspicious for pneumonia. Moderate stenosis in the proximal superior mesenteric artery.    #8 Shiley trach at bedside    LVAD speed increased to 9200   Bronch   TC since . Patient transferred to SDU.    INR today 2.64.  H&H 7.3/24 this AM.  Will repeat CBC at noon, and will send stool guaiac Patient with persistent abdominal tenderness, rate of tube feeds decreased.  No nausea/vomiting.     INR today 2.4. H&H 9.1/28.6 low flow overnight /N&V, refusing Tube feeds on D5 1/2 normal  @50 cc/hr   INR 2.69  H&H 7.7/.1 refusing Tube feeds on D5 1/2 normal  @50 cc/hr. This am + BM Melena Dr Oneill HF  aware- PRBC x1  GI team consulted -  NPO  plan on study in am-  D/w Dr Cadet Patient  to return to CTU for further management; 1PRBCS    Post op INR 2.2 today.  No bleeding. BC + for SM.  Pt is hypotensive requiring pressor and inotropic support.  ID follow up today on Cefepime will follow.   R PTC for PTX    CT C/A/P: sub q emphysema in R chest wall, GGO RUL, small ascites CTH negative; Abd US: GB thickening, pericholecystic fluid     Perchole drain in place continues to drain total output overnight 133.  Fever today 38.8    duplex LE negative    Patient with persistent abdominal pain, refusing tube feeds and medications, Psych consulted   CTA A/P ordered to r/o mesenteric ischemia 2/2 persistent anorexia, nausea, vomiting. Revealed:  Evaluation of the mesenteric vessels is limited by streak artifact from LVAD. There appears to be severe stenosis of the proximal SMA; abdominal mesenteric doppler is recommended for further evaluation. 2.  No small bowel findings to suggest acute mesenteric ischemia. 3.  Focal dissections involving the right and left common iliac arteries.  8/15: Cultures resulted BC 1/2 +GPC in clusters, SC enterobacter; mesenteric duplex: borderline stenosis of proximal SMA  : CT C/A/P noncon: Nondular opacities in R lung apex w/cavitation, abd nl  :  Continue current care, treatment of thyrotoxicosis with medications as per endocrine, d/c ABX as per team.    RUQ sono: Contracted gallbladder with cholecystostomy tube in place.  9/10 failed SMA stent, c/b RP bleed s/p 3U PRCB   CTA Abd/Pelvis L RP hematoma    Trach decannulated    intubated fro resp distress for increased WOB, LL pneumonia; CT C/A/P: progressive ISABELLE opacities and L consolidations, multifocal pneumonia    TTE (EF 25%, decreased RV, mod MR)   10/3 VT -> Lidocaine gtt   10/4 Trach size 6 DCT cuff  10/5 CT abd (neg for intra-abd abcess, severe stenosis of prox SMA and b/l iliac arteries)  10/18 SMA Stent   10/23 Emend for nausea   10/24 +Occult  10/29 FEES, RUQ ascites and pericholecystic fluid     Today:  Hold off on Plavix for stent, will start ASA 81mg. Tolerating tube feeds, Vital @ 50cc/hr with intermittent supervised supplement NGT feedings with PO pureed diet with mildly thickened liquids while OOBTC, plan for calorie count. Tolerating TC since 10/19, continue TC as tolerated. Will schedule family meeting for Friday to discuss possible decannulation. PT/ambulation as tolerated.     REVIEW OF SYSTEMS:  Gen: No fever  EYES/ENT: No visual changes;  No vertigo or throat pain   NECK: No pain   RES:  Some SOB briefly, otherwise no Cough  CARD: No chest pain   GI: +intermittently c/o abd pain and nausea   : No dysuria  NEURO: No weakness  SKIN: No itching, rashes     ICU Vital Signs Last 24 Hrs  T(C): 36.1 (2021 04:00), Max: 36.2 (2021 16:00)  T(F): 97 (2021 04:00), Max: 97.2 (2021 00:00)  HR: 95 (2021 04:00) (95 - 115)  BP: --  BP(mean): 88 (2021 04:00) (86 - 88)  ABP: --  ABP(mean): --  RR: 14 (2021 04:00) (14 - 27)  SpO2: 100% (2021 04:00) (100% - 100%)      Physical Exam:  Gen:  NAD  CNS: intermittently agitated but responds to commands  Neck: no JVD, +trach, +central line   RES : coarse breath sounds, no wheezing    CVS: +LVAD hum   Abd: Soft. Sybil drain with bilious fluid. Positive BS throughout. Mild RUQ abdominal tenderness by perc sybil.  Skin: No rash, erythema, cyanosis.  Vasc: Warm and well-perfused.  Ext:  no edema    ============================I/O===========================   I&O's Detail    2021 07:01  -  2021 07:00  --------------------------------------------------------  IN:    Enteral Tube Flush: 240 mL    Miscellaneous Tube Feedin mL    Oral Fluid: 360 mL  Total IN: 1800 mL    OUT:    Drain (mL): 350 mL    Voided (mL): 200 mL  Total OUT: 550 mL    Total NET: 1250 mL        ============================ LABS =========================                        9.0    8.62  )-----------( 291      ( 2021 00:45 )             28.6     -    133<L>  |  97  |  25<H>  ----------------------------<  113<H>  4.2   |  26  |  0.49<L>    Ca    9.4      2021 00:44  Phos  2.5       Mg     2.0         TPro  7.8  /  Alb  3.6  /  TBili  0.4  /  DBili  x   /  AST  37  /  ALT  73<H>  /  AlkPhos  210<H>      LIVER FUNCTIONS - ( 2021 00:44 )  Alb: 3.6 g/dL / Pro: 7.8 g/dL / ALK PHOS: 210 U/L / ALT: 73 U/L / AST: 37 U/L / GGT: x                 ======================Micro/Rad/Cardio=================  Culture: Reviewed   CXR: Reviewed  Echo:Reviewed  ======================================================  PAST MEDICAL & SURGICAL HISTORY:  CHF (congestive heart failure)    CAD (coronary artery disease)    Depression    Pleural effusion    History of  novel coronavirus disease (COVID-19)  2020    Hemorrhoids    Bleeding hemorrhoids    Peripheral arterial disease    Claudication    BPH with urinary obstruction    ACC/AHA stage D systolic heart failure    Anticoagulation goal of INR 2.0 to 2.5    Falls    Clavicle fracture    CKD (chronic kidney disease), stage III    Iron deficiency anemia    H/O epistaxis    Vertigo    GI bleed    S/P TVR (tricuspid valve repair)    S/P ventricular assist device    S/P endoscopy      ====================ASSESSMENT ==============  Stage D Nonischemic Cardiomyopathy, Status Post HM2 on 2017    Cardiogenic shock  Acute hypoxemic respiratory failure s/p trach , decannulated on ; Reintubated on    GI bleed , Status Post Enteroscopy   Anemia, in setting of melena   Chronic Kidney Disease  C.diff positive on    Hypovolemic shock  Septic shock  GB thickening/percholecystic s/p perc sybil by IR   SMA stenosis s/p SMA Stent on 10/18   Serratia/citrobacter pneumonia   Stenotrophomonas pneumonia   Enterobacter pneumonia   Nasuea/vomiting  Deconditioning  Hyponatremia  Stress hyperglycemia     DNR / No vent    Plan:  ====================== NEUROLOGY=====================  Intermittently agitated, continue close monitoring of neuro status   C/w sertraline, mirtazapine for depression   PT/ambulation as tolerated     mirtazapine 7.5 milliGRAM(s) Oral at bedtime  sertraline 100 milliGRAM(s) Oral daily    ==================== RESPIRATORY======================  Acute hypoxemic respiratory failure s/p #8 shiley trach on ; decannulated on ; Reintubated on ; trach size 6 DCT cuff on 10/4   Continue close monitoring of respiratory rate and breathing pattern, following of ABG’s with [place] monitoring, continuous pulse oximetry monitoring.   Tolerating TC since 10/19, continue TC as tolerated   Guaifenesin PO PRN mucous plugging     DNR/ no vent    guaiFENesin Oral Liquid (Sugar-Free) 200 milliGRAM(s) Oral every 4 hours PRN mucous plugging    ====================CARDIOVASCULAR==================  Stage D Nonischemic Cardiomyopathy, Status Post HM2 on 2017; LVAD settings 9200, flow 4.1  TTE 10/4:  Severely decreased LV systolic function, Diffuse hypokinesis. Mild AR. No pericardial effusion   Hold off on Plavix for stent, will start ASA 81mg today   VT resolved, continue rate control w/ Lopressor     metoprolol tartrate 12.5 milliGRAM(s) Oral every 12 hours    ===================HEMATOLOGIC/ONC ===================  CTA A/P on  +L RP hematoma;   Acute blood loss anemia, monitor H&H/Plts   LUE US on 10/20 with no pseudoaneurysm    ===================== RENAL =========================  Hx of CKD, continue monitoring urine output, I&OS, BUN/Cr   Replete lytes PRN. Keep K> 4 and Mg >2  C/w standing magnesium     magnesium sulfate  IVPB 2 Gram(s) IV Intermittent daily    ==================== GASTROINTESTINAL===================  CT A/P 10/5 neg for intra-abd abcess, severe stenosis of prox SMA and b/l iliac arteries  CTA A/P : Evaluation of the mesenteric vessels is limited by streak artifact from LVAD. There appears to be severe stenosis of the proximal SMA; abdominal mesenteric doppler is recommended for further evaluation. 2.  No small bowel findings to suggest acute mesenteric ischemia. 3.  Focal dissections involving the right and left common iliac arteries.  Stenosis of proximal SMA, s/p failed SMA stent c/b RP bleed on 9/10 - SMA stent with Vascular 10/18   +occult feces on 10/24    RUQ U/S findings of pericholecystic fluid despite perc sybil drain in place as well as ascites, will follow-up with GI regarding potential contrast study  Tolerating tube feeds, Vital @ 50cc/hr with intermittent supervised supplement NGT feedings with PO pureed diet with mildly thickened liquids while OOBTC, plan for calorie count.   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   BCx 10/14 and SCX on 10/14 +Stenotrophomonas maltophilia s/p Bactrim   Empiric coverage with Ciprofloxacin for chronic suppression, PO Vanco for C. Diff ppx  F/u Gen Sx RUQ U/S findings of pericholecystic fluid despite perc sybil drain in place as well as ascites    ciprofloxacin     Tablet 500 milliGRAM(s) Oral every 12 hours  vancomycin    Solution 125 milliGRAM(s) Oral every 12 hours        Patient requires continuous monitoring with bedside rhythm monitoring, pulse ox monitoring, and intermittent blood gas analysis. Care plan discussed with ICU care team. Patient remained critical and at risk for life threatening decompensation.     By signing my name below, I, Agnieszka Cherry, attest that this documentation has been prepared under the direction and in the presence of CLAUDIA Burleson   Electronically signed: Romel Shaffer, 21 @ 08:34    I, Anastasiya Moulton, personally performed the services described in this documentation. all medical record entries made by the romel were at my direction and in my presence. I have reviewed the chart and agree that the record reflects my personal performance and is accurate and complete  Electronically signed: CLAUDIA Burleson

## 2021-11-03 NOTE — PROGRESS NOTE ADULT - PROBLEM SELECTOR PLAN 4
- currently tolerating PO pureed diet along with tube feeds, will continue to advance diet as tolerated  - undergoing calorie count (was started 11/3)

## 2021-11-03 NOTE — PROGRESS NOTE ADULT - ASSESSMENT
Mental status exam: Seen sitting in chair with Trach collar. Alert and oriented x3. Improved ability to communicate. Trying to speak over trach with some success. Thought process goal oriented; content appropriate to conversation. Mood "tired." Affect engaged, less constricted. Denies symptoms of hopelessness, depression. Denies SI/HI. Insight and judgment adequate.      Psychological assessment: Seen sitting in chair. Appetite fair. Eating pureed diet. Reports some nausea after meals. During assessment moved back into bed. Sat in chair for several hours. Continues to have discomfort at trach site but less so today. Denies abdominal pain. Mood "tired." Denies symptoms of hopelessness, depression. Denies feeling worried or nervous. Denies SI/HI.  No signs of delirium. Feels he is making progress and hopeful he can go home. His sister visited today. Receptive to support and validation.     Dx: Adjustment disorder with anxiety and depressed mood. F43.23 Systolic heart failure I50.2  LVAD Z95.811. Delirium, mixed hypoactive/hyperactive. F05    Recommendations:   When possible, take outside weather permitting    Provide emotional support   Behavioral Cardiology will follow      18 minutes spent on total patient encounter    Jessica Hennessy, PhD  [phone]

## 2021-11-03 NOTE — CHART NOTE - NSCHARTNOTEFT_GEN_A_CORE
Nutrition Chart Note.   Pt seen for Calorie Count Initiation    Chart reviewed, events noted. 66M, PMH ACC/AHA stage D HF 2/2 NICM s/p HM2 LVAD (9/2017). Here initially for GIB prolonged hospital course, s/p tracheostomy, acalculous cholecystitis s/p perc dawn. Patient c persistent intermittent abdominal pain - found with SMA stenosis on mesenteric angiogram on 9/10. S/p trach placement 10/4. S/p mesenteric angiogram with x2 stents (10/18). now made DNR. Most recently has been receiving supplemental PO feeds after seen by SLP; currently tolerating with EN infusions.    Pt currently ordered for EN with supplemental PO diet. Currently receiving EN regimen of Vital 1.5 @ goal rate of 50ml/hr x 24hrs to provide 1200ml formula, 1800kcals, 81g protein, 917ml free H2O (meets 34kcals/kg & 1.5g protein/kg based on 10/21 wt 53.1kg). Supplemental Pureed diet with Mildly Thick Liquids. Discussed with Team at rounds this AM initiating Calorie count to assess adequacy of PO diet. Also to add Magic cup BID and will recommend Ensure Enlive x1 daily (thickened to appropriate consistency).    Calorie count hung on Pt's door - Team aware. RD to follow-up and assess upon completion.    RD remains available upon request and will follow up per protocol   Wendy Travis, MS, RD, CDN, [facility]  pager #[phone]

## 2021-11-03 NOTE — PROGRESS NOTE ADULT - ASSESSMENT
Assessment and Recommendation:   · Assessment	  Assessment and recommendation :  Recurrent Acute hypoxic respiratory Failure  FI02 is 40% S/P tracheostomy  on track. collar   Acute blood loss anemia S/P multiple  blood transfusion post tracheostomy   recurrent  septic shock  weaned off  vasopressin   sepsis   on Cipro tablets   po vancomycin   C-Dificle colitis on po vancomycin   S/P cholecystostomy tube placement by IR    to repeat Abdominal ultra sound  patient is S/P  repeat vacular procedure and SMA stent   AF RVR back to regular sinus Rhythm   Non ischemic cardiomyopathy continue ACE inhibitor and B-Blockers   hyponatremia fluid restriction   S/P 3 unit of blood transfusion   Stage D systolic heart failure S/P LVAD HM2   MH2 LVAD  with  TV Annuloplasty  Severe peripheral vascular disease   severe hyperglycemia on insulin coverage    Reglan 10 mg for Gastric Motility   hyperthyroidism on Methimazole 10mg TID   critical care polyneuropathy   Anemia of Acute blood Loss   severe protein caloric malnutrition   magnesium supplement keep above 2   Chronic kidney disease stage III  Depressed and withdrawn   on  NG tube feeding   GI prophylaxis with PPI   Discussed with TCV team

## 2021-11-03 NOTE — PROGRESS NOTE ADULT - PROBLEM SELECTOR PROBLEM 9
Retroperitoneal bleed
Hyperthyroidism
Retroperitoneal bleed
Transaminitis
Retroperitoneal bleed
Transaminitis
Retroperitoneal bleed
Hyponatremia
Retroperitoneal bleed
Transaminitis
Retroperitoneal bleed

## 2021-11-03 NOTE — PROGRESS NOTE ADULT - PROBLEM SELECTOR PLAN 6
- leukocytosis improved, remains afebrile   - currently on Cipro 500 mg PO QD for suppressive therapy   - last +sputum cx on 10/30 grew out strenotrophomonas maltophilia (likely colonization). blood cultures remain negative at this time  - Prior cholecystitis w/ per dawn drain with bilious output

## 2021-11-03 NOTE — PROGRESS NOTE ADULT - SUBJECTIVE AND OBJECTIVE BOX
RICKY JOINT  MRN#: [id number]  Subjective:  Pulmonary progress  : recurrent Acute hypoxic respiratory Failure ,aspiration pneumonia, NICM  ,   65M PMH ACC/AHA stage D HF due to NICM HM2 LVAD , TV annuloplasty ring 17 as destination therapy due to severe peripheral artery disease with significant stenosis  SIADH, Depression, CKD-3 with hyperkalemia, past E. coli UTIs, driveline drainage (21) and COVID-19 (back in 2020)  He was recently seen in clinic where he complained of abdominal pain and dark stools w constipation back in May. He presents to [facility] ER today weakness and fatigue, moderate and + Black stools for three days, on coumadin secondary to warfarin use in the setting of an LVAD. Patient has required transfusions for GIB in the past. Mostly recently back in 2021 pt had anemia with dark stools. No interventions was done at that time. However Last Endoscopy was done in 2020 (negative). Today labs show patient is anemic with H/H of 4.5/16.3,. INR is 8.84 MAP in the 90s, Temp 35.1. He denies any chest pain, shortness of breath, dizziness, abd pain, nausea or vomiting. found to have  rectal bleeding underwent endoscopy ,old blood in the proximal ileum ,  develop sepsis with LL opacity given Antibiotics , Extubated , reintubated , Bronchoscopy on Zosyn for LL pneumonia  and Amiodarone S/P TV Annuloplasty , patient remain intubated on full ventilatory support .S/P multiple units of blood transfusion , remain on full ventilatory support on Precedex and propofol , new central IJ line , diarrhea C diff. +ve on po vancomycin and IV Flagyl,  mildly distended belly , fever start on cefepime 2gm q 8 hrs S/P tracheostomy .  new RT Subclavian central line continue on contact  isolation ,S/P  C-diff antibiotics, no more diarrhea back on full support mechanical ventilator , chest x ray show improvement in LLL air space disease, more awake and responsive on tube feeding no more diarrhea ,  no nausea or vomiting or diarrhea still very weak and tired , back on tube feeding ,still on po vancomycin , getting PT and OT at bed side ,   , no SOB getting stronger , improve muscle tone patient transfer to monitor bed still on contact isolation for C-Difficel colitis on 50% FI02, and change to Suresh  tube feeding still loose stool . H/H drop significantly require blood transfusion , most likely GI bleeding , IV heparin D/C ,  H/H is stable ., patient develop TR sided  pneumothorax require chest tube placement , RT IJ central line  placed , develop fever shaking chills , blood culture positive for serratia marcescens , start on cefepime .the patient  become hypoxic and hypotensive placed on full ventilatory support and Vasopressin , levophed and Dobutamine ,S/P blood transfusion on meropenem and vancomycin ,   , on and  off pressors , occasional agitation on Precedex .S/P IR cholecystostomy tube drainage placement in the RT upper Quadrant , resume anticoagulation chest x ray noted C-PAP trail lasted only for 2 hrs , new RT SC line and D/C RT IJ line , RT pig tail cathter has been removed , tolerating C-PAP trial placed on trach. collar 50% FI02 GI consultant noted on NG tube feeding as tolerated , develop AF RVR S/P  bolus Amiodarone  back to regular sinus Rhythm , Flat Affect depressed , back on tube feeding Vital AF at 60 cc/ hr .still intermittent abdominal pain , no fever saturation is accepted  back on full ventilatory support ,   on methimazole for hyperthyroidism ,  condition is the same ,still C/O Abdominal pain , white count is improving , no chest pain or SOB ,  .placed on Reglan 10 mg TID for gastric motility , depressed , withdrawn. , S/P  mesenteric angiogram , unable to stent SMA S/P 3 units of blood transfusion   RT IJ in place reassessed for stent in the SMA by vascular,  dark stool stable H/H ,surgical opinion for possible Lap cholecystectomy. over night events noted develop respiratory distress, , rectal bleeding, require intubation placed on full ventilatory support , FI02 is 50% also became hemianosmically unstable require triple pressor support with levophed , vasopressin and norsynephrine, pan culture placed  on Vancomycin IV and PO  and Zosyn, sedated with propofol kept NPO , new central line RT and left IJ cathter placed . Diflucan Added .fever of 38.5 weaned off  vasopressin ,   fever adding IV vancomycin and  vancomycin solution  , and Bactrim , S/P  tracheostomy , bleeding receiving blood transfusion on vasopressin , no more bleeding , no fever , more awake and responsive ,tolerating tube feeding , ID and heart failure follow up appreciated , depresses no weaning for now , magnesium is low to give supplement too keep Above 2 , patient S/P  vascular procedure for superior mesenteric artery occlusion S/P 2 stent placement , patient tolerate it very well  , left upper extremities hematoma .vascular follow up appreciated , increase WBC , new RT UPE midline , black tarry stool , very loose R/O recurrent GI bleeding and C-dificile  colitis stable H/H no events over night , diarrhea is improved ,  WBC is improving  , hyponatremia no change , S/P FEEST study result is pending , PO feeding with observation . on Cipro antibiotics  no major over night events , worsening LFTs for repeat Abdominal sonogram pending , still diarrhea .but less , trial of po feeding      (2021 16:57)    PAST MEDICAL & SURGICAL HISTORY:  CHF (congestive heart failure)    CAD (coronary artery disease)  Depression    Pleural effusion    History of 2019 novel coronavirus disease (COVID-19)  2020    Hemorrhoids    Bleeding hemorrhoids    Peripheral arterial disease    Claudication    BPH with urinary obstruction    ACC/AHA stage D systolic heart failure  Anticoagulation goal of INR 2.0 to 2.5    Falls    Clavicle fracture    CKD (chronic kidney disease), stage III    Iron deficiency anemia    H/O epistaxis    Vertigo    GI bleed    S/P TVR (tricuspid valve repair)    S/P ventricular assist device    S/P endoscopy    OBJECTIVE:  ICU Vital Signs Last 24 Hrs  T(C): 38.2 (2021 10:00), Max: 38.5 (2021 12:00)  T(F): 100.8 (2021 10:00), Max: 101.3 (2021 12:00)  HR: 65 (2021 10:00) (61 - 69)  BP: --  BP(mean): --  ABP: 105/67 (2021 10:00) (90/54 - 113/64)  ABP(mean): 77 (2021 10:00) (63 - 77)  RR: 20 (2021 10:00) (19 - 35)  SpO2: 99% (2021 10:00) (96% - 100%)      -19 @ 07:01  -  06-20 @ 07:00  --------------------------------------------------------  IN: 2693.9 mL / OUT: 1415 mL / NET: 1278.9 mL     @ 07:01   @ 10:49  --------------------------------------------------------  IN: 420.8 mL / OUT: 115 mL / NET: 305.8 mL  PHYSICAL EXAM: Daily   Elderly male  on trach. collar  FI02 is 40% S/P tracheostomy   Daily Weight in k.4 (2021 00:00)  HEENT:     + NCAT  + EOMI  - Conjuctival edema   - Icterus   - Thrush   - ETT  + NGT/OGT  Neck:         + FROM  + JVD  - Nodes - Masses + Mid-line trachea + Tracheostomy  Chest:            normal A-P diameter    Lungs:          + CTA   + Rhonchi    - Rales    - Wheezing + Decreased  LT BS   - Dullness R L  Cardiac:       + S1 + S2    + RRR   - Irregular   - S3  - S4    - Murmurs   - Rub   - Hamman’s sign   Abdomen:    + BS  + Soft + Non-tender - Distended - Organomegaly - PEG .cholecystostomy tube in place  Extremities:   - Cyanosis U/L   - Clubbing  U/L  + LE/UE Edema LUE hematoma   + Capillary refill    + Pulses   Neuro:        - Awake   -  Alert   - Confused   - Lethargic   + Sedated  + Generalized Weakness  Skin:        - Rashes    - Erythema   + Normal incisions   + IV sites intact          HOSPITAL MEDICATIONS: All medications reviewed and analyzed  MEDICATIONS  (STANDING):  amiodarone    Tablet 200 milliGRAM(s) Oral daily  chlorhexidine 0.12% Liquid 15 milliLiter(s) Oral Mucosa every 12 hours  chlorhexidine 2% Cloths 1 Application(s) Topical <User Schedule>  dexmedetomidine Infusion 0.5 MICROgram(s)/kG/Hr (9.81 mL/Hr) IV Continuous <Continuous>  dextrose 50% Injectable 50 milliLiter(s) IV Push every 15 minutes  heparin  Infusion 400 Unit(s)/Hr (12.5 mL/Hr) IV Continuous <Continuous>  Hydromorphone  Injectable 0.5 milliGRAM(s) IV Push once  insulin lispro (ADMELOG) corrective regimen sliding scale   SubCutaneous every 6 hours  pantoprazole  Injectable 40 milliGRAM(s) IV Push every 12 hours  piperacillin/tazobactam IVPB.. 3.375 Gram(s) IV Intermittent every 8 hours  propofol Infusion 20 MICROgram(s)/kG/Min (9.42 mL/Hr) IV Continuous <Continuous>  sodium chloride 0.9% lock flush 3 milliLiter(s) IV Push every 8 hours  sodium chloride 0.9%. 1000 milliLiter(s) (10 mL/Hr) IV Continuous <Continuous>    MEDICATIONS  (PRN):  acetaminophen    Suspension .. 650 milliGRAM(s) Oral every 6 hours PRN Temp greater or equal to 38C (100.4F)    LABS: All Lab data reviewed and analyzed                                    9.0    8.62  )-----------( 291      ( 2021 00:45 )             28.6   11-    133<L>  |  97  |  25<H>  ----------------------------<  113<H>  4.2   |  26  |  0.49<L>    Ca    9.4      2021 00:44  Phos  2.5     11-  Mg     2.0     -    TPro  7.8  /  Alb  3.6  /  TBili  0.4  /  DBili  x   /  AST  37  /  ALT  73<H>  /  AlkPhos  210<H>      Ca    9.7      2021 01:10  Phos  2.9     11-  Mg     1.5         TPro  7.7  /  Alb  3.8  /  TBili  0.4  /  DBili  x   /  AST  44<H>  /  ALT  89<H>  /  AlkPhos  210<H>      Ca    9.5      2021 00:29  Phos  2.9     11-  Mg     1.8         TPro  7.9  /  Alb  3.6  /  TBili  0.5  /  DBili  x   /  AST  53<H>  /  ALT  102<H>  /  AlkPhos  212<H>                                                                                                                                                                                                       PTT - ( 2021 04:52 )  PTT:45.2 sec LIVER FUNCTIONS - ( 2021 00:42 )  Alb: 3.4 g/dL / Pro: 6.7 g/dL / ALK PHOS: 213 U/L / ALT: 15 U/L / AST: 24 U/L / GGT: x           RADIOLOGY: - Reviewed and analyzed  , LVAD HM2, CT scan of abdomen reviewed result noted

## 2021-11-04 NOTE — PROGRESS NOTE ADULT - SUBJECTIVE AND OBJECTIVE BOX
VITAL SIGNS    Telemetry:      Vital Signs Last 24 Hrs  T(C): 36.2 (21 @ 15:05), Max: 36.2 (21 @ 15:05)  T(F): 97.1 (21 @ 15:05), Max: 97.1 (21 @ 15:05)  HR: 106 (21 @ 14:26) (95 - 120)  BP: --  RR: 22 (21 @ 14:26) (14 - 36)  SpO2: 100% (21 @ 14:26) (100% - 100%)                    @ 07:01  -   @ 07:00  --------------------------------------------------------  IN: 1732 mL / OUT: 725 mL / NET: 1007 mL     @ 07:01  -   @ 16:43  --------------------------------------------------------  IN: 250 mL / OUT: 200 mL / NET: 50 mL          Daily     Daily Weight in k.7 (2021 00:00)            CAPILLARY BLOOD GLUCOSE      POCT Blood Glucose.: 145 mg/dL (2021 11:19)  POCT Blood Glucose.: 265 mg/dL (2021 06:00)  POCT Blood Glucose.: 115 mg/dL (2021 17:01)            Drains:     MS         [  ] Drainage:                 L Pleural  [  ]  Drainage:                R Pleural  [  ]  Drainage:    Pacing Wires        [  ]   Settings:                                  Isolated  [  ]    Coumadin    [ ] YES          [  ]      NO                                   PHYSICAL EXAM        Neurology: alert and oriented x 3, nonfocal, no gross deficits  CV : s1 s2 RRR  Sternal Wound :  CDI , Stable  Lungs: cta  Abdomen: soft, nontender, nondistended, positive bowel sounds, last bowel movement                       chest tubes  :    voiding / aleman - sbd         Extremities:      edema   /  -   calve tenderness ,    L leg  /  R leg  incisions cdi          aspirin  chewable 81 milliGRAM(s) Oral daily  chlorhexidine 2% Cloths 1 Application(s) Topical <User Schedule>  ciprofloxacin     Tablet 500 milliGRAM(s) Oral every 12 hours  guaiFENesin Oral Liquid (Sugar-Free) 200 milliGRAM(s) Oral every 4 hours PRN  hydrALAZINE 10 milliGRAM(s) Oral every 8 hours  insulin lispro (ADMELOG) corrective regimen sliding scale   SubCutaneous every 6 hours  magnesium oxide 400 milliGRAM(s) Oral every 12 hours  methimazole 10 milliGRAM(s) Oral daily  metoclopramide Injectable 10 milliGRAM(s) IV Push every 8 hours  metoprolol tartrate 12.5 milliGRAM(s) Oral every 8 hours  mirtazapine 7.5 milliGRAM(s) Oral at bedtime  multivitamin 1 Tablet(s) Oral daily  ondansetron Injectable 8 milliGRAM(s) IV Push every 8 hours  pantoprazole  Injectable 40 milliGRAM(s) IV Push every 12 hours  predniSONE   Tablet 1 milliGRAM(s) Oral daily  sertraline 100 milliGRAM(s) Oral daily  simethicone 80 milliGRAM(s) Chew every 8 hours PRN  thiamine 100 milliGRAM(s) Oral daily  urea Oral Powder 15 Gram(s) Oral daily  vancomycin    Solution 125 milliGRAM(s) Oral every 12 hours                    Physical Therapy Rec:   Home  [  ]   Home w/ PT  [  ]  Rehab  [  ]  Discussed with Cardiothoracic Team at AM rounds.     VITAL SIGNS    Telemetry:  st 115    Vital Signs Last 24 Hrs  T(C): 36.2 (21 @ 15:05), Max: 36.2 (21 @ 15:05)  T(F): 97.1 (21 @ 15:05), Max: 97.1 (21 @ 15:05)  HR: 106 (21 @ 14:26) (95 - 120)  BP: --  RR: 22 (21 @ 14:26) (14 - 36)  SpO2: 100% (21 @ 14:26) (100% - 100%)                    @ 07:01  -   @ 07:00  --------------------------------------------------------  IN: 1732 mL / OUT: 725 mL / NET: 1007 mL     @ 07:01  -   @ 16:43  --------------------------------------------------------  IN: 250 mL / OUT: 200 mL / NET: 50 mL          Daily     Daily Weight in k.7 (2021 00:00)            CAPILLARY BLOOD GLUCOSE      POCT Blood Glucose.: 145 mg/dL (2021 11:19)  POCT Blood Glucose.: 265 mg/dL (2021 06:00)  POCT Blood Glucose.: 115 mg/dL (2021 17:01)            Drains:       Pacing Wires         Coumadin    [ ] YES          [ x ]      NO                                   PHYSICAL EXAM        Neurology: alert and oriented x 3, nonfocal, no gross deficits  CV : s1 s2 RRR  Sternal Wound :  CDI , Stable  Lungs: cta  rlq driveline cdi  R flank drain  Abdomen: soft, nontender, nondistended, positive bowel sounds, last bowel movement                        :    voiding        Extremities:   -   edema   /  -   calve tenderness ,              aspirin  chewable 81 milliGRAM(s) Oral daily  chlorhexidine 2% Cloths 1 Application(s) Topical <User Schedule>  ciprofloxacin     Tablet 500 milliGRAM(s) Oral every 12 hours  guaiFENesin Oral Liquid (Sugar-Free) 200 milliGRAM(s) Oral every 4 hours PRN  hydrALAZINE 10 milliGRAM(s) Oral every 8 hours  insulin lispro (ADMELOG) corrective regimen sliding scale   SubCutaneous every 6 hours  magnesium oxide 400 milliGRAM(s) Oral every 12 hours  methimazole 10 milliGRAM(s) Oral daily  metoclopramide Injectable 10 milliGRAM(s) IV Push every 8 hours  metoprolol tartrate 12.5 milliGRAM(s) Oral every 8 hours  mirtazapine 7.5 milliGRAM(s) Oral at bedtime  multivitamin 1 Tablet(s) Oral daily  ondansetron Injectable 8 milliGRAM(s) IV Push every 8 hours  pantoprazole  Injectable 40 milliGRAM(s) IV Push every 12 hours  predniSONE   Tablet 1 milliGRAM(s) Oral daily  sertraline 100 milliGRAM(s) Oral daily  simethicone 80 milliGRAM(s) Chew every 8 hours PRN  thiamine 100 milliGRAM(s) Oral daily  urea Oral Powder 15 Gram(s) Oral daily  vancomycin    Solution 125 milliGRAM(s) Oral every 12 hours                    Physical Therapy Rec:   Home  [  ]   Home w/ PT  [  ]  Rehab  [  ]  Discussed with Cardiothoracic Team at AM rounds.

## 2021-11-04 NOTE — PROGRESS NOTE ADULT - ACP WITH SCRIBE
I personally performed the service described in the documentation  recorded by the scribe in my presence, and it accurately and completely records my words and actions.

## 2021-11-04 NOTE — PROGRESS NOTE ADULT - ATTENDING COMMENTS
66/M with Stage D HF s/p HM 2 as DT (Severe PAD), abdominal pain 2/2 mysentric ischemia, admitted on 6/2021 for anemia with Hb 4.5 with supratheraputic INR 8.8, with hospital course complicated by resp failure s/p Trach ( retrach on 10/4) weaned off vent, Entero bacter bactremia, serratia/ stenotrophomonas Pneumonia, recurrent GI bleed, s/p SMA stent for abdominal pain, now DNR and Doesn't want to be back on vent in the setting of clinical worsening.     LVAD interrogation: HM 2, Speed 9200, FLow 4- 5's, PI, Power stable, no significant alarms noted.    Plan:  Stage D HF/ HM 2  MAP's in goal,  Off  AC due to recurrent Bleeding  LDH stable   cont metoprolol 12.5mg BID  start spironolactone 25mg daily    GI bleeding  off AC, resumed Aspirin for SMA stent.   Cont monitor hb, if drop, will send stool occult.     Resp failure:  On trach collar, shiley 6  cont wean as tolerated  use PMV as tolerated    Sepsis, Enterobacter bactremia, Serratia PNA  recent Resp cx with  stenotrophomonas light growth, austen colonization.  cont cipro  repeat cultures as needed     SMA stenting, off antiplatelets due to recurrent GIB,   cont aspirin given recent stenting   monitor HB. if has recurrent hb drop, will stop aspirin    Cholecystitis - achalculous, s/p perc drain placement    Hyperthyroidism  cont methimazole and steroid janeth    Hyponatremia  nehro recs appreciated  improved     Diet:  cont tube feed at goal  cont Puree diet, calorie count to assess oral intake.  aspiration precaution    OOB to chair, PT/OT  transfer to Step down    family meeting held yesterday with pt and his sister. Pt wants to have Trach and would like to go to rehab only at [facility]. the team understood his wishes and explained we will try our best to get him to rehab of his choice but he may need to look into other facilities as well.   DNR.

## 2021-11-04 NOTE — PROGRESS NOTE ADULT - CRITICAL CARE SERVICES
35
30
45
40
30
30
35
35
30
35
40
45
45
30
30
35
40
40
45
30
35
40
45
75
30
35
40
40
45
30
35
40
45
35
40
45
30
35
40
30
35
35
40
40
45
35
35
30
40
30
40
40
45
60
40
40
60
40
60
45
40

## 2021-11-04 NOTE — PROGRESS NOTE ADULT - SUBJECTIVE AND OBJECTIVE BOX
RICKY HAMPTON  MRN-[id number]  Patient is a 66y old  Male who presents with a chief complaint of Anemia, Supratherapeutic INR, Dark Stools (2021 18:51)    HPI:  65M PMH ACC/AHA stage D HF due to NICM HM2 LVAD , TV annuloplasty ring 17 as destination therapy due to severe peripheral artery disease with significant stenosis  SIADH, Depression, CKD-3 with hyperkalemia, past E. coli UTIs, driveline drainage (21) and COVID-19 (back in 2020)  He was recently seen in clinic where he complained of abdominal pain and dark stools w constipation back in May. He presents to [facility] ER today weakness and fatigue, moderate and + Black stools for three days, on coumadin secondary to warfarin use in the setting of an LVAD. Patient has required transfusions for GIB in the past. Mostly recently back in 2021 pt had anemia with dark stools. No interventions was done at that time. However Last Endoscopy was done in 2020 (negative). Today labs show patient is anemic with H/H of 4.5/16.3,. INR is 8.84 MAP in the 90s, Temp 35.1. He denies any chest pain, shortness of breath, dizziness, abd pain, nausea or vomiting.       (2021 16:57)      Surgery/Hospital Course:   admit for melena w/ anemia, INR 8.84   6/15 Capsul study (+) for small bowel bleed, balloon endoscopy (old blood in prox ileum); post EGD - septic w/ L opacity, re-intubated for concern for aspiration, TTE (Mod MR, decrease biV w/ interventricular septum boweing towards R)   bronch    +C Diff    CT C/A/P: Fluid filled colon which may be 2/2 rapid transit. Small bilateral pleffs with associates. Compressive atelectasis New ISABELLE & LLL  parenchymal opacities, suspicious for pneumonia. Moderate stenosis in the proximal superior mesenteric artery.    #8 Shiley trach at bedside    LVAD speed increased to 9200   Bronch   TC since . Patient transferred to SDU.    INR today 2.64.  H&H 7.3/24 this AM.  Will repeat CBC at noon, and will send stool guaiac Patient with persistent abdominal tenderness, rate of tube feeds decreased.  No nausea/vomiting.     INR today 2.4. H&H 9.1/28.6 low flow overnight /N&V, refusing Tube feeds on D5 1/2 normal  @50 cc/hr   INR 2.69  H&H 7.7/.1 refusing Tube feeds on D5 1/2 normal  @50 cc/hr. This am + BM Melena Dr Oneill HF  aware- PRBC x1  GI team consulted -  NPO  plan on study in am-  D/w Dr Cadet Patient  to return to CTU for further management; 1PRBCS    Post op INR 2.2 today.  No bleeding. BC + for SM.  Pt is hypotensive requiring pressor and inotropic support.  ID follow up today on Cefepime will follow.   R PTC for PTX    CT C/A/P: sub q emphysema in R chest wall, GGO RUL, small ascites CTH negative; Abd US: GB thickening, pericholecystic fluid     Perchole drain in place continues to drain total output overnight 133.  Fever today 38.8    duplex LE negative    Patient with persistent abdominal pain, refusing tube feeds and medications, Psych consulted   CTA A/P ordered to r/o mesenteric ischemia 2/2 persistent anorexia, nausea, vomiting. Revealed:  Evaluation of the mesenteric vessels is limited by streak artifact from LVAD. There appears to be severe stenosis of the proximal SMA; abdominal mesenteric doppler is recommended for further evaluation. 2.  No small bowel findings to suggest acute mesenteric ischemia. 3.  Focal dissections involving the right and left common iliac arteries.  8/15: Cultures resulted BC 1/2 +GPC in clusters, SC enterobacter; mesenteric duplex: borderline stenosis of proximal SMA  : CT C/A/P noncon: Nondular opacities in R lung apex w/cavitation, abd nl  :  Continue current care, treatment of thyrotoxicosis with medications as per endocrine, d/c ABX as per team.    RUQ sono: Contracted gallbladder with cholecystostomy tube in place.  9/10 failed SMA stent, c/b RP bleed s/p 3U PRCB   CTA Abd/Pelvis L RP hematoma    Trach decannulated    intubated fro resp distress for increased WOB, LL pneumonia; CT C/A/P: progressive ISABELLE opacities and L consolidations, multifocal pneumonia    TTE (EF 25%, decreased RV, mod MR)   10/3 VT -> Lidocaine gtt   10/4 Trach size 6 DCT cuff  10/5 CT abd (neg for intra-abd abcess, severe stenosis of prox SMA and b/l iliac arteries)  10/18 SMA Stent   10/23 Emend for nausea   10/24 +Occult  10/29 FEES, RUQ ascites and pericholecystic fluid     Today:    REVIEW OF SYSTEMS:  Speaks over trach   Gen: No fever  EYES/ENT: No visual changes;  No vertigo or throat pain   NECK: No pain   RES:  Some SOB briefly, otherwise no Cough  CARD: No chest pain   GI: +intermittently c/o abd pain and nausea   : No dysuria  NEURO: No weakness  SKIN: No itching, rashes     ICU Vital Signs Last 24 Hrs  T(C): 35.7 (2021 08:00), Max: 35.9 (2021 04:00)  T(F): 96.3 (2021 08:00), Max: 96.7 (2021 04:00)  HR: 100 (2021 08:00) (91 - 116)  BP: --  BP(mean): 80 (2021 08:00) (80 - 96)  ABP: --  ABP(mean): --  RR: 16 (2021 08:00) (14 - 26)  SpO2: 100% (2021 08:00) (100% - 100%)      Physical Exam:  Gen:  NAD  CNS: intermittently agitated but responds to commands  Neck: no JVD, +trach, +central line   RES : coarse breath sounds, no wheezing    CVS: +LVAD hum   Abd: Soft. Sybil drain with bilious fluid. Positive BS throughout. Mild RUQ abdominal tenderness by perc sybil.  Skin: No rash, erythema, cyanosis.  Vasc: Warm and well-perfused.  Ext:  no edema    ============================I/O===========================   I&O's Detail    2021 07:01  -  2021 07:00  --------------------------------------------------------  IN:    Enteral Tube Flush: 200 mL    Miscellaneous Tube Feedin mL    Oral Fluid: 332 mL  Total IN: 1732 mL    OUT:    Drain (mL): 200 mL    Voided (mL): 525 mL  Total OUT: 725 mL    Total NET: 1007 mL      2021 07:01  -  2021 08:28  --------------------------------------------------------  IN:    Miscellaneous Tube Feedin mL  Total IN: 100 mL    OUT:    Drain (mL): 50 mL  Total OUT: 50 mL    Total NET: 50 mL        ============================ LABS =========================                        8.7    9.69  )-----------( 302      ( 2021 00:30 )             28.3     11-04    134<L>  |  99  |  24<H>  ----------------------------<  124<H>  3.9   |  25  |  0.49<L>    Ca    9.2      2021 00:30  Phos  2.6     11-04  Mg     1.8         TPro  7.5  /  Alb  3.6  /  TBili  0.4  /  DBili  x   /  AST  27  /  ALT  57<H>  /  AlkPhos  188<H>  1104    LIVER FUNCTIONS - ( 2021 00:30 )  Alb: 3.6 g/dL / Pro: 7.5 g/dL / ALK PHOS: 188 U/L / ALT: 57 U/L / AST: 27 U/L / GGT: x                 ======================Micro/Rad/Cardio=================  Culture: Reviewed   CXR: Reviewed  Echo:Reviewed  ======================================================  PAST MEDICAL & SURGICAL HISTORY:  CHF (congestive heart failure)    CAD (coronary artery disease)    Depression    Pleural effusion    History of  novel coronavirus disease (COVID-19)  2020    Hemorrhoids    Bleeding hemorrhoids    Peripheral arterial disease    Claudication    BPH with urinary obstruction    ACC/AHA stage D systolic heart failure    Anticoagulation goal of INR 2.0 to 2.5    Falls    Clavicle fracture    CKD (chronic kidney disease), stage III    Iron deficiency anemia    H/O epistaxis    Vertigo    GI bleed    S/P TVR (tricuspid valve repair)    S/P ventricular assist device    S/P endoscopy      ====================ASSESSMENT ==============  Stage D Nonischemic Cardiomyopathy, Status Post HM2 on 2017    Cardiogenic shock  Acute hypoxemic respiratory failure s/p trach , decannulated on ; Reintubated on    GI bleed , Status Post Enteroscopy   Anemia, in setting of melena   Chronic Kidney Disease  C.diff positive on    Hypovolemic shock  Septic shock  GB thickening/percholecystic s/p perc sybil by IR   SMA stenosis s/p SMA Stent on 10/18   Serratia/citrobacter pneumonia   Stenotrophomonas pneumonia   Enterobacter pneumonia   Nasuea/vomiting  Deconditioning  Hyponatremia  Stress hyperglycemia     DNR / No vent    Plan:  ====================== NEUROLOGY=====================  Intermittently agitated, continue close monitoring of neuro status   C/w sertraline, mirtazapine for depression   PT/ambulation as tolerated     mirtazapine 7.5 milliGRAM(s) Oral at bedtime  sertraline 100 milliGRAM(s) Oral daily    ==================== RESPIRATORY======================  Acute hypoxemic respiratory failure s/p #8 shiley trach on ; decannulated on ; Reintubated on ; trach size 6 DCT cuff on 10/4   Continue close monitoring of respiratory rate and breathing pattern, following of ABG’s with Janet monitoring, continuous pulse oximetry monitoring.   Tolerating TC since 10/19, continue TC as tolerated   Guaifenesin PO PRN mucous plugging     guaiFENesin Oral Liquid (Sugar-Free) 200 milliGRAM(s) Oral every 4 hours PRN mucous plugging    ====================CARDIOVASCULAR==================  Stage D Nonischemic Cardiomyopathy, Status Post HM2 on 2017; LVAD settings 9200, flow 5.3   TTE 10/4:  Severely decreased LV systolic function, Diffuse hypokinesis. Mild AR. No pericardial effusion   Hold off on Plavix for stent, continue ASA   VT resolved, continue rate control w/ Lopressor   Afterload reduction with Hydralazine     aspirin  chewable 81 milliGRAM(s) Oral daily  hydrALAZINE 10 milliGRAM(s) Oral every 8 hours  metoprolol tartrate 12.5 milliGRAM(s) Oral every 8 hours    ===================HEMATOLOGIC/ONC ===================  CTA A/P on  +L RP hematoma;   Acute blood loss anemia, monitor H&H/Plts   LUE US on 10/20 with no pseudoaneurysm    ===================== RENAL =========================  Hx of CKD, continue monitoring urine output, I&OS, BUN/Cr   Replete lytes PRN. Keep K> 4 and Mg >2  C/w standing magnesium     magnesium sulfate  IVPB 2 Gram(s) IV Intermittent daily    ==================== GASTROINTESTINAL===================  Stenosis of proximal SMA, s/p failed SMA stent c/b RP bleed on 9/10, now s/p SMA stent with Vascular 10/18   RUQ U/S findings of pericholecystic fluid despite perc sybil drain in place as well as ascites   Tolerating tube feeds, Vital @ 50cc/hr with intermittent supervised supplement feeds, PO pureed diet with mildly thickened liquids while OOBTC   +occult feces on 10/24  Simethicone PRN for gas   Reglan for gut motility  Zofran for nausea     magnesium oxide 400 milliGRAM(s) Oral every 12 hours  multivitamin 1 Tablet(s) Oral daily  GI prophylaxis, pantoprazole  Injectable 40 milliGRAM(s) IV Push every 12 hours  simethicone 80 milliGRAM(s) Chew every 8 hours PRN Gas  thiamine 100 milliGRAM(s) Oral daily  urea Oral Powder 15 Gram(s) Oral daily  ondansetron Injectable 8 milliGRAM(s) IV Push every 8 hours  metoclopramide Injectable 10 milliGRAM(s) IV Push every 8 hours    =======================    ENDOCRINE  =====================  Stress hyperglycemia, continue glucose control with admelog sliding scale   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   BCx 10/14 and SCX on 10/14 +Stenotrophomonas maltophilia s/p Bactrim   Empiric coverage with Ciprofloxacin for chronic suppression, PO Vanco for C. Diff ppx    ciprofloxacin     Tablet 500 milliGRAM(s) Oral every 12 hours  vancomycin    Solution 125 milliGRAM(s) Oral every 12 hours        Patient requires continuous monitoring with bedside rhythm monitoring, pulse ox monitoring, and intermittent blood gas analysis. Care plan discussed with ICU care team. Patient remained critical and at risk for life threatening decompensation.     By signing my name below, I, Agnieszka Cherry, attest that this documentation has been prepared under the direction and in the presence of Jaclyn Mendoza NP   Electronically signed: Romel Shaffer, 21 @ 08:28    I, Jaclyn Mendoza, personally performed the services described in this documentation. all medical record entries made by the romel were at my direction and in my presence. I have reviewed the chart and agree that the record reflects my personal performance and is accurate and complete  Electronically signed: Jaclyn Mendoza NP        RICKY HAMPTON  MRN-[id number]  Patient is a 66y old  Male who presents with a chief complaint of Anemia, Supratherapeutic INR, Dark Stools (2021 18:51)    HPI:  65M PMH ACC/AHA stage D HF due to NICM HM2 LVAD , TV annuloplasty ring 17 as destination therapy due to severe peripheral artery disease with significant stenosis  SIADH, Depression, CKD-3 with hyperkalemia, past E. coli UTIs, driveline drainage (21) and COVID-19 (back in 2020)  He was recently seen in clinic where he complained of abdominal pain and dark stools w constipation back in May. He presents to [facility] ER today weakness and fatigue, moderate and + Black stools for three days, on coumadin secondary to warfarin use in the setting of an LVAD. Patient has required transfusions for GIB in the past. Mostly recently back in 2021 pt had anemia with dark stools. No interventions was done at that time. However Last Endoscopy was done in 2020 (negative). Today labs show patient is anemic with H/H of 4.5/16.3,. INR is 8.84 MAP in the 90s, Temp 35.1. He denies any chest pain, shortness of breath, dizziness, abd pain, nausea or vomiting.       (2021 16:57)      Surgery/Hospital Course:   admit for melena w/ anemia, INR 8.84   6/15 Capsul study (+) for small bowel bleed, balloon endoscopy (old blood in prox ileum); post EGD - septic w/ L opacity, re-intubated for concern for aspiration, TTE (Mod MR, decrease biV w/ interventricular septum boweing towards R)   bronch    +C Diff    CT C/A/P: Fluid filled colon which may be 2/2 rapid transit. Small bilateral pleffs with associates. Compressive atelectasis New ISABELLE & LLL  parenchymal opacities, suspicious for pneumonia. Moderate stenosis in the proximal superior mesenteric artery.    #8 Shiley trach at bedside    LVAD speed increased to 9200   Bronch   TC since . Patient transferred to SDU.    INR today 2.64.  H&H 7.3/24 this AM.  Will repeat CBC at noon, and will send stool guaiac Patient with persistent abdominal tenderness, rate of tube feeds decreased.  No nausea/vomiting.     INR today 2.4. H&H 9.1/28.6 low flow overnight /N&V, refusing Tube feeds on D5 1/2 normal  @50 cc/hr   INR 2.69  H&H 7.7/.1 refusing Tube feeds on D5 1/2 normal  @50 cc/hr. This am + BM Melena Dr Oneill HF  aware- PRBC x1  GI team consulted -  NPO  plan on study in am-  D/w Dr Cadet Patient  to return to CTU for further management; 1PRBCS    Post op INR 2.2 today.  No bleeding. BC + for SM.  Pt is hypotensive requiring pressor and inotropic support.  ID follow up today on Cefepime will follow.   R PTC for PTX    CT C/A/P: sub q emphysema in R chest wall, GGO RUL, small ascites CTH negative; Abd US: GB thickening, pericholecystic fluid     Perchole drain in place continues to drain total output overnight 133.  Fever today 38.8    duplex LE negative    Patient with persistent abdominal pain, refusing tube feeds and medications, Psych consulted   CTA A/P ordered to r/o mesenteric ischemia 2/2 persistent anorexia, nausea, vomiting. Revealed:  Evaluation of the mesenteric vessels is limited by streak artifact from LVAD. There appears to be severe stenosis of the proximal SMA; abdominal mesenteric doppler is recommended for further evaluation. 2.  No small bowel findings to suggest acute mesenteric ischemia. 3.  Focal dissections involving the right and left common iliac arteries.  8/15: Cultures resulted BC 1/2 +GPC in clusters, SC enterobacter; mesenteric duplex: borderline stenosis of proximal SMA  : CT C/A/P noncon: Nondular opacities in R lung apex w/cavitation, abd nl  :  Continue current care, treatment of thyrotoxicosis with medications as per endocrine, d/c ABX as per team.    RUQ sono: Contracted gallbladder with cholecystostomy tube in place.  9/10 failed SMA stent, c/b RP bleed s/p 3U PRCB   CTA Abd/Pelvis L RP hematoma    Trach decannulated    intubated fro resp distress for increased WOB, LL pneumonia; CT C/A/P: progressive ISABELLE opacities and L consolidations, multifocal pneumonia    TTE (EF 25%, decreased RV, mod MR)   10/3 VT -> Lidocaine gtt   10/4 Trach size 6 DCT cuff  10/5 CT abd (neg for intra-abd abcess, severe stenosis of prox SMA and b/l iliac arteries)  10/18 SMA Stent   10/23 Emend for nausea   10/24 +Occult  10/29 FEES, RUQ ascites and pericholecystic fluid     Today:  Ambulation, TC, and PO diet as tolerated.  Day 2 of calorie count  Discussions yesterday as to discharge planning, agreeable to go to [facility] rehab however will need to be decannulated and NGT removed.  Patient candidate for SDU.     REVIEW OF SYSTEMS:  Speaks over trach   Gen: No fever  EYES/ENT: No visual changes;  No vertigo or throat pain   NECK: No pain   RES:  Some SOB briefly, otherwise no Cough  CARD: No chest pain   GI: +intermittently c/o abd pain and nausea   : No dysuria  NEURO: No weakness  SKIN: No itching, rashes     ICU Vital Signs Last 24 Hrs  T(C): 35.7 (2021 08:00), Max: 35.9 (2021 04:00)  T(F): 96.3 (2021 08:00), Max: 96.7 (2021 04:00)  HR: 100 (2021 08:00) (91 - 116)  BP: --  BP(mean): 80 (2021 08:00) (80 - 96)  ABP: --  ABP(mean): --  RR: 16 (2021 08:00) (14 - 26)  SpO2: 100% (2021 08:00) (100% - 100%)      Physical Exam:  Gen:  NAD  CNS: intermittently agitated but responds to commands  Neck: no JVD, +trach  RES : coarse breath sounds, no wheezing    CVS: +LVAD hum   Abd: Soft. Sybil drain with bilious fluid. Positive BS throughout. Mild RUQ abdominal tenderness by perc sybil.  Skin: No rash, erythema, cyanosis.  Vasc: Warm and well-perfused.  Ext:  no edema    ============================I/O===========================   I&O's Detail    2021 07:01  -  2021 07:00  --------------------------------------------------------  IN:    Enteral Tube Flush: 200 mL    Miscellaneous Tube Feedin mL    Oral Fluid: 332 mL  Total IN: 1732 mL    OUT:    Drain (mL): 200 mL    Voided (mL): 525 mL  Total OUT: 725 mL    Total NET: 1007 mL      2021 07:01  -  2021 08:28  --------------------------------------------------------  IN:    Miscellaneous Tube Feedin mL  Total IN: 100 mL    OUT:    Drain (mL): 50 mL  Total OUT: 50 mL    Total NET: 50 mL        ============================ LABS =========================                        8.7    9.69  )-----------( 302      ( 2021 00:30 )             28.3     11-04    134<L>  |  99  |  24<H>  ----------------------------<  124<H>  3.9   |  25  |  0.49<L>    Ca    9.2      2021 00:30  Phos  2.6     11-04  Mg     1.8     11-04    TPro  7.5  /  Alb  3.6  /  TBili  0.4  /  DBili  x   /  AST  27  /  ALT  57<H>  /  AlkPhos  188<H>  11    LIVER FUNCTIONS - ( 2021 00:30 )  Alb: 3.6 g/dL / Pro: 7.5 g/dL / ALK PHOS: 188 U/L / ALT: 57 U/L / AST: 27 U/L / GGT: x                 ======================Micro/Rad/Cardio=================  Culture: Reviewed   CXR: Reviewed  Echo:Reviewed  ======================================================  PAST MEDICAL & SURGICAL HISTORY:  CHF (congestive heart failure)    CAD (coronary artery disease)    Depression    Pleural effusion    History of  novel coronavirus disease (COVID-19)  2020    Hemorrhoids    Bleeding hemorrhoids    Peripheral arterial disease    Claudication    BPH with urinary obstruction    ACC/AHA stage D systolic heart failure    Anticoagulation goal of INR 2.0 to 2.5    Falls    Clavicle fracture    CKD (chronic kidney disease), stage III    Iron deficiency anemia    H/O epistaxis    Vertigo    GI bleed    S/P TVR (tricuspid valve repair)    S/P ventricular assist device    S/P endoscopy      ====================ASSESSMENT ==============  Stage D Nonischemic Cardiomyopathy, Status Post HM2 on 2017    Cardiogenic shock  Acute hypoxemic respiratory failure s/p trach , decannulated on ; Reintubated on    GI bleed , Status Post Enteroscopy   Anemia, in setting of melena   Chronic Kidney Disease  C.diff positive on    Hypovolemic shock  Septic shock  GB thickening/percholecystic s/p perc sybil by IR   SMA stenosis s/p SMA Stent on 10/18   Serratia/citrobacter pneumonia   Stenotrophomonas pneumonia   Enterobacter pneumonia   Nasuea/vomiting  Deconditioning  Hyponatremia  Stress hyperglycemia     DNR / No vent    Plan:  ====================== NEUROLOGY=====================  Non focal, Continue close monitoring of neuro status   C/w sertraline, mirtazapine for depression   PT/ambulation as tolerated     mirtazapine 7.5 milliGRAM(s) Oral at bedtime  sertraline 100 milliGRAM(s) Oral daily    ==================== RESPIRATORY======================  Acute hypoxemic respiratory failure s/p #8 shiley trach on ; decannulated on ; Reintubated on ; trach size 6 DCT cuff on 10/4   Continue close monitoring of respiratory rate and breathing pattern, following of ABG’s, continuous pulse oximetry monitoring.   Tolerating TC since 10/19, continue TC as tolerated   Guaifenesin PO PRN mucous plugging     guaiFENesin Oral Liquid (Sugar-Free) 200 milliGRAM(s) Oral every 4 hours PRN mucous plugging    ====================CARDIOVASCULAR==================  Stage D Nonischemic Cardiomyopathy, Status Post HM2 on 2017; LVAD settings 9200, flow 5.3   TTE 10/4:  Severely decreased LV systolic function, Diffuse hypokinesis. Mild AR. No pericardial effusion   Hold off on Plavix for stent, continue ASA   VT resolved, continue rate control w/ Lopressor, increase as MAPS tolerated  Afterload reduction with Hydralazine     aspirin  chewable 81 milliGRAM(s) Oral daily  hydrALAZINE 10 milliGRAM(s) Oral every 8 hours  metoprolol tartrate 12.5 milliGRAM(s) Oral every 8 hours    ===================HEMATOLOGIC/ONC ===================  CTA A/P on  +L RP hematoma;   Acute blood loss anemia, monitor H&H/Plts   ASA restarted for SMA stent, close monitoring of H/H, if drop send occult   LUE US on 10/20 with no pseudoaneurysm    ===================== RENAL =========================  Hx of CKD, continue monitoring urine output, I&OS, BUN/Cr   Replete lytes PRN. Keep K> 4 and Mg >2  C/w standing magnesium     magnesium sulfate  IVPB 2 Gram(s) IV Intermittent daily    ==================== GASTROINTESTINAL===================  Stenosis of proximal SMA, s/p failed SMA stent c/b RP bleed on 9/10, now s/p SMA stent with Vascular 10/18   RUQ U/S findings of pericholecystic fluid despite perc sybil drain in place as well as ascites   Tolerating tube feeds, Vital @ 50cc/hr with intermittent supervised supplement feeds, PO pureed diet with mildly thickened liquids while OOBTC   +occult feces on 10/24  Simethicone PRN for gas   Reglan for gut motility  Zofran for nausea     magnesium oxide 400 milliGRAM(s) Oral every 12 hours  multivitamin 1 Tablet(s) Oral daily  GI prophylaxis, pantoprazole  Injectable 40 milliGRAM(s) IV Push every 12 hours  simethicone 80 milliGRAM(s) Chew every 8 hours PRN Gas  thiamine 100 milliGRAM(s) Oral daily  urea Oral Powder 15 Gram(s) Oral daily  ondansetron Injectable 8 milliGRAM(s) IV Push every 8 hours  metoclopramide Injectable 10 milliGRAM(s) IV Push every 8 hours    =======================    ENDOCRINE  =====================  Stress hyperglycemia, continue glucose control with admelog sliding scale   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   BCx 10/14 and SCX on 10/14 +Stenotrophomonas maltophilia s/p Bactrim   Empiric coverage with Ciprofloxacin for chronic suppression, PO Vanco for C. Diff ppx    ciprofloxacin     Tablet 500 milliGRAM(s) Oral every 12 hours  vancomycin    Solution 125 milliGRAM(s) Oral every 12 hours        Patient requires continuous monitoring with bedside rhythm monitoring, pulse ox monitoring, and intermittent blood gas analysis. Care plan discussed with ICU care team. Patient remained critical and at risk for life threatening decompensation.     By signing my name below, I, Agnieszka Cherry, attest that this documentation has been prepared under the direction and in the presence of Jaclyn Mendoza NP   Electronically signed: Cesia Shaffer, 21 @ 08:28    I, Jaclyn Mendoza, personally performed the services described in this documentation. all medical record entries made by the luisibe were at my direction and in my presence. I have reviewed the chart and agree that the record reflects my personal performance and is accurate and complete  Electronically signed: Jaclyn Mendoza NP

## 2021-11-04 NOTE — PROGRESS NOTE ADULT - PROBLEM SELECTOR PLAN 3
- Has been transfused multiple times throughout this admission, last transfusion was on 10/23  - on ASA 81mg Q24.

## 2021-11-04 NOTE — PROGRESS NOTE ADULT - SUBJECTIVE AND OBJECTIVE BOX
SUBJECTIVE:    REVIEW OF SYSTEMS:  CONSTITUTIONAL: No fever, weight loss, or fatigue  CARDIOLOGY: denies chest pain, shortness of breath or syncopal episodes.   RESPIRATORY: denies shortness of breath, wheezing.   NEUROLOGICAL: NO weakness, no focal deficits to report.  ENDOCRINOLOGICAL: no recent change in diabetic medications.   GI: no BRBPR, no N,V,diarrhea.    PSYCHIATRY: normal mood and affect  HEENT: no nasal discharge, no ecchymosis  SKIN: no ecchymosis, no breakdown  MUSCULOSKELETAL: Full range of motion x4.      OBJECTIVE:    Vital Signs Last 24 Hrs  T(C): 35.7 (04 Nov 2021 08:00), Max: 35.9 (04 Nov 2021 04:00)  T(F): 96.3 (04 Nov 2021 08:00), Max: 96.7 (04 Nov 2021 04:00)  HR: 116 (04 Nov 2021 12:00) (95 - 116)  BP: --  BP(mean): 80 (04 Nov 2021 08:00) (80 - 96)  RR: 23 (04 Nov 2021 12:00) (14 - 24)  SpO2: 100% (04 Nov 2021 12:00) (100% - 100%)    PHYSICAL EXAM:  General Appearance: well appearing, normal for age and gender. 	  Neck: normal JVP, no bruit.   Eyes: No xanthelasma, Extra ocular muscles intact.   Cardiovascular: regular rate and rhythm S1 S2, No JVD, No murmurs, No edema  Respiratory: Lungs clear to auscultation	  Psychiatry: Alert and oriented x 3, Mood & affect appropriate  Gastrointestinal:  Soft, Non-tender  Skin/Integument: No rashes, No ecchymosis, No cyanosis	  Neurologic: Non-focal  Musculoskeletal/ extremities: Normal range of motion, No clubbing, cyanosis or edema  Vascular: Peripheral pulses palpable 2+ bilaterally    MEDICATIONS  (STANDING):  aspirin  chewable 81 milliGRAM(s) Oral daily  chlorhexidine 2% Cloths 1 Application(s) Topical <User Schedule>  ciprofloxacin     Tablet 500 milliGRAM(s) Oral every 12 hours  hydrALAZINE 10 milliGRAM(s) Oral every 8 hours  insulin lispro (ADMELOG) corrective regimen sliding scale   SubCutaneous every 6 hours  magnesium oxide 400 milliGRAM(s) Oral every 12 hours  methimazole 10 milliGRAM(s) Oral daily  metoclopramide Injectable 10 milliGRAM(s) IV Push every 8 hours  metoprolol tartrate 12.5 milliGRAM(s) Oral every 8 hours  mirtazapine 7.5 milliGRAM(s) Oral at bedtime  multivitamin 1 Tablet(s) Oral daily  ondansetron Injectable 8 milliGRAM(s) IV Push every 8 hours  pantoprazole  Injectable 40 milliGRAM(s) IV Push every 12 hours  predniSONE   Tablet 1 milliGRAM(s) Oral daily  sertraline 100 milliGRAM(s) Oral daily  thiamine 100 milliGRAM(s) Oral daily  urea Oral Powder 15 Gram(s) Oral daily  vancomycin    Solution 125 milliGRAM(s) Oral every 12 hours    MEDICATIONS  (PRN):  guaiFENesin Oral Liquid (Sugar-Free) 200 milliGRAM(s) Oral every 4 hours PRN mucous plugging  simethicone 80 milliGRAM(s) Chew every 8 hours PRN Gas    	  TELEMETRY:    ECG:    TTE:    LABS:                        8.7    9.69  )-----------( 302      ( 04 Nov 2021 00:30 )             28.3     11-04    134<L>  |  99  |  24<H>  ----------------------------<  124<H>  3.9   |  25  |  0.49<L>    Ca    9.2      04 Nov 2021 00:30  Phos  2.6     11-04  Mg     1.8     11-04    TPro  7.5  /  Alb  3.6  /  TBili  0.4  /  DBili  x   /  AST  27  /  ALT  57<H>  /  AlkPhos  188<H>  11-04            I&O's Summary    03 Nov 2021 07:01  -  04 Nov 2021 07:00  --------------------------------------------------------  IN: 1732 mL / OUT: 725 mL / NET: 1007 mL    04 Nov 2021 07:01  -  04 Nov 2021 13:43  --------------------------------------------------------  IN: 250 mL / OUT: 200 mL / NET: 50 mL

## 2021-11-04 NOTE — PROGRESS NOTE ADULT - ASSESSMENT
Assessment and Recommendation:   · Assessment	  Assessment and recommendation :  Recurrent Acute hypoxic respiratory Failure  FI02 is 40% S/P tracheostomy  on track. collar   Acute blood loss anemia S/P multiple  blood transfusion post tracheostomy   recurrent  septic shock  weaned off  vasopressin   sepsis   on Cipro tablets   po vancomycin   C-Dificle colitis on po vancomycin   S/P cholecystostomy tube placement by IR    to repeat Abdominal ultra sound  patient is S/P  repeat vacular procedure and SMA stent   AF RVR back to regular sinus Rhythm   Non ischemic cardiomyopathy continue ACE inhibitor and B-Blockers   hyponatremia fluid restriction   S/P 3 unit of blood transfusion   Stage D systolic heart failure S/P LVAD HM2   MH2 LVAD  with  TV Annuloplasty  Severe peripheral vascular disease   severe hyperglycemia on insulin coverage    Reglan 10 mg for Gastric Motility   hyperthyroidism on Methimazole 10mg TID   critical care polyneuropathy   Anemia of Acute blood Loss   severe protein caloric malnutrition   magnesium supplement keep above 2   Chronic kidney disease stage III  Depressed and withdrawn   on  NG tube feeding and PO trial  GI prophylaxis with PPI   Discussed with TCV team

## 2021-11-04 NOTE — PROGRESS NOTE ADULT - ASSESSMENT
52 y/o POD #0 for tracheostomy 2/2 respiratory failure. Pt is doing well, #6 DCT cuffed in place, on the vent, with minimal serosanguinous oozing from stoma, no active bleed. sutures x4 and umbilical tie in place.  Resident

## 2021-11-04 NOTE — PROGRESS NOTE ADULT - SUBJECTIVE AND OBJECTIVE BOX
RICKY JOINT  MRN#: [id number]  Subjective:  Pulmonary progress  : recurrent Acute hypoxic respiratory Failure ,aspiration pneumonia, NICM  ,   65M PMH ACC/AHA stage D HF due to NICM HM2 LVAD , TV annuloplasty ring 17 as destination therapy due to severe peripheral artery disease with significant stenosis  SIADH, Depression, CKD-3 with hyperkalemia, past E. coli UTIs, driveline drainage (21) and COVID-19 (back in 2020)  He was recently seen in clinic where he complained of abdominal pain and dark stools w constipation back in May. He presents to [facility] ER today weakness and fatigue, moderate and + Black stools for three days, on coumadin secondary to warfarin use in the setting of an LVAD. Patient has required transfusions for GIB in the past. Mostly recently back in 2021 pt had anemia with dark stools. No interventions was done at that time. However Last Endoscopy was done in 2020 (negative). Today labs show patient is anemic with H/H of 4.5/16.3,. INR is 8.84 MAP in the 90s, Temp 35.1. He denies any chest pain, shortness of breath, dizziness, abd pain, nausea or vomiting. found to have  rectal bleeding underwent endoscopy ,old blood in the proximal ileum ,  develop sepsis with LL opacity given Antibiotics , Extubated , reintubated , Bronchoscopy on Zosyn for LL pneumonia  and Amiodarone S/P TV Annuloplasty , patient remain intubated on full ventilatory support .S/P multiple units of blood transfusion , remain on full ventilatory support on Precedex and propofol , new central IJ line , diarrhea C diff. +ve on po vancomycin and IV Flagyl,  mildly distended belly , fever start on cefepime 2gm q 8 hrs S/P tracheostomy .  new RT Subclavian central line continue on contact  isolation ,S/P  C-diff antibiotics, no more diarrhea back on full support mechanical ventilator , chest x ray show improvement in LLL air space disease, more awake and responsive on tube feeding no more diarrhea ,  no nausea or vomiting or diarrhea still very weak and tired , back on tube feeding ,still on po vancomycin , getting PT and OT at bed side ,   , no SOB getting stronger , improve muscle tone patient transfer to monitor bed still on contact isolation for C-Difficel colitis on 50% FI02, and change to Suresh  tube feeding still loose stool . H/H drop significantly require blood transfusion , most likely GI bleeding , IV heparin D/C ,  H/H is stable ., patient develop TR sided  pneumothorax require chest tube placement , RT IJ central line  placed , develop fever shaking chills , blood culture positive for serratia marcescens , start on cefepime .the patient  become hypoxic and hypotensive placed on full ventilatory support and Vasopressin , levophed and Dobutamine ,S/P blood transfusion on meropenem and vancomycin ,   , on and  off pressors , occasional agitation on Precedex .S/P IR cholecystostomy tube drainage placement in the RT upper Quadrant , resume anticoagulation chest x ray noted C-PAP trail lasted only for 2 hrs , new RT SC line and D/C RT IJ line , RT pig tail cathter has been removed , tolerating C-PAP trial placed on trach. collar 50% FI02 GI consultant noted on NG tube feeding as tolerated , develop AF RVR S/P  bolus Amiodarone  back to regular sinus Rhythm , Flat Affect depressed , back on tube feeding Vital AF at 60 cc/ hr .still intermittent abdominal pain , no fever saturation is accepted  back on full ventilatory support ,   on methimazole for hyperthyroidism ,  condition is the same ,still C/O Abdominal pain , white count is improving , no chest pain or SOB ,  .placed on Reglan 10 mg TID for gastric motility , depressed , withdrawn. , S/P  mesenteric angiogram , unable to stent SMA S/P 3 units of blood transfusion   RT IJ in place reassessed for stent in the SMA by vascular,  dark stool stable H/H ,surgical opinion for possible Lap cholecystectomy. over night events noted develop respiratory distress, , rectal bleeding, require intubation placed on full ventilatory support , FI02 is 50% also became hemianosmically unstable require triple pressor support with levophed , vasopressin and norsynephrine, pan culture placed  on Vancomycin IV and PO  and Zosyn, sedated with propofol kept NPO , new central line RT and left IJ cathter placed . Diflucan Added .fever of 38.5 weaned off  vasopressin ,   fever adding IV vancomycin and  vancomycin solution  , and Bactrim , S/P  tracheostomy , bleeding receiving blood transfusion on vasopressin , no more bleeding , no fever , more awake and responsive ,tolerating tube feeding , ID and heart failure follow up appreciated , depresses no weaning for now , magnesium is low to give supplement too keep Above 2 , patient S/P  vascular procedure for superior mesenteric artery occlusion S/P 2 stent placement , patient tolerate it very well  , left upper extremities hematoma .vascular follow up appreciated , increase WBC , new RT UPE midline , black tarry stool , very loose R/O recurrent GI bleeding and C-dificile  colitis stable H/H no events over night , diarrhea is improved ,  WBC is improving  , hyponatremia no change , S/P FEEST study result is pending , PO feeding with observation . on Cipro antibiotics  no major over night events , worsening LFTs for repeat Abdominal sonogram pending , still diarrhea .but less , trial of po feeding , on monitor bed .     (2021 16:57)    PAST MEDICAL & SURGICAL HISTORY:  CHF (congestive heart failure)    CAD (coronary artery disease)  Depression    Pleural effusion    History of 2019 novel coronavirus disease (COVID-19)  2020    Hemorrhoids    Bleeding hemorrhoids    Peripheral arterial disease    Claudication    BPH with urinary obstruction    ACC/AHA stage D systolic heart failure  Anticoagulation goal of INR 2.0 to 2.5    Falls    Clavicle fracture    CKD (chronic kidney disease), stage III    Iron deficiency anemia    H/O epistaxis    Vertigo    GI bleed    S/P TVR (tricuspid valve repair)    S/P ventricular assist device    S/P endoscopy    OBJECTIVE:  ICU Vital Signs Last 24 Hrs  T(C): 38.2 (2021 10:00), Max: 38.5 (2021 12:00)  T(F): 100.8 (2021 10:00), Max: 101.3 (2021 12:00)  HR: 65 (2021 10:00) (61 - 69)  BP: --  BP(mean): --  ABP: 105/67 (2021 10:00) (90/54 - 113/64)  ABP(mean): 77 (2021 10:00) (63 - 77)  RR: 20 (2021 10:00) (19 - 35)  SpO2: 99% (2021 10:00) (96% - 100%)      -19 @ 07:01  -  06-20 @ 07:00  --------------------------------------------------------  IN: 2693.9 mL / OUT: 1415 mL / NET: 1278.9 mL     @ 07: @ 10:49  --------------------------------------------------------  IN: 420.8 mL / OUT: 115 mL / NET: 305.8 mL  PHYSICAL EXAM: Daily   Elderly male  on trach. collar  FI02 is 40% S/P tracheostomy   Daily Weight in k.4 (2021 00:00)  HEENT:     + NCAT  + EOMI  - Conjuctival edema   - Icterus   - Thrush   - ETT  + NGT/OGT  Neck:         + FROM  + JVD  - Nodes - Masses + Mid-line trachea + Tracheostomy  Chest:            normal A-P diameter    Lungs:          + CTA   + Rhonchi    - Rales    - Wheezing + Decreased  LT BS   - Dullness R L  Cardiac:       + S1 + S2    + RRR   - Irregular   - S3  - S4    - Murmurs   - Rub   - Hamman’s sign   Abdomen:    + BS  + Soft + Non-tender - Distended - Organomegaly - PEG .cholecystostomy tube in place  Extremities:   - Cyanosis U/L   - Clubbing  U/L  + LE/UE Edema LUE hematoma   + Capillary refill    + Pulses   Neuro:        - Awake   -  Alert   - Confused   - Lethargic   + Sedated  + Generalized Weakness  Skin:        - Rashes    - Erythema   + Normal incisions   + IV sites intact          HOSPITAL MEDICATIONS: All medications reviewed and analyzed  MEDICATIONS  (STANDING):  amiodarone    Tablet 200 milliGRAM(s) Oral daily  chlorhexidine 0.12% Liquid 15 milliLiter(s) Oral Mucosa every 12 hours  chlorhexidine 2% Cloths 1 Application(s) Topical <User Schedule>  dexmedetomidine Infusion 0.5 MICROgram(s)/kG/Hr (9.81 mL/Hr) IV Continuous <Continuous>  dextrose 50% Injectable 50 milliLiter(s) IV Push every 15 minutes  heparin  Infusion 400 Unit(s)/Hr (12.5 mL/Hr) IV Continuous <Continuous>  Hydromorphone  Injectable 0.5 milliGRAM(s) IV Push once  insulin lispro (ADMELOG) corrective regimen sliding scale   SubCutaneous every 6 hours  pantoprazole  Injectable 40 milliGRAM(s) IV Push every 12 hours  piperacillin/tazobactam IVPB.. 3.375 Gram(s) IV Intermittent every 8 hours  propofol Infusion 20 MICROgram(s)/kG/Min (9.42 mL/Hr) IV Continuous <Continuous>  sodium chloride 0.9% lock flush 3 milliLiter(s) IV Push every 8 hours  sodium chloride 0.9%. 1000 milliLiter(s) (10 mL/Hr) IV Continuous <Continuous>    MEDICATIONS  (PRN):  acetaminophen    Suspension .. 650 milliGRAM(s) Oral every 6 hours PRN Temp greater or equal to 38C (100.4F)    LABS: All Lab data reviewed and analyzed                                       8.7    9.69  )-----------( 302      ( 2021 00:30 )             28.3   11-04    134<L>  |  99  |  24<H>  ----------------------------<  124<H>  3.9   |  25  |  0.49<L>    Ca    9.2      2021 00:30  Phos  2.6     11-04  Mg     1.8     11-    TPro  7.5  /  Alb  3.6  /  TBili  0.4  /  DBili  x   /  AST  27  /  ALT  57<H>  /  AlkPhos  188<H>  11-    Ca    9.4      2021 00:44  Phos  2.5     11-03  Mg     2.0     11-    TPro  7.8  /  Alb  3.6  /  TBili  0.4  /  DBili  x   /  AST  37  /  ALT  73<H>  /  AlkPhos  210<H>  11-03                                                                                                                                                                                         PTT - ( 2021 04:52 )  PTT:45.2 sec LIVER FUNCTIONS - ( 2021 00:42 )  Alb: 3.4 g/dL / Pro: 6.7 g/dL / ALK PHOS: 213 U/L / ALT: 15 U/L / AST: 24 U/L / GGT: x           RADIOLOGY: - Reviewed and analyzed  , LVAD HM2, CT scan of abdomen reviewed result noted

## 2021-11-04 NOTE — PROGRESS NOTE ADULT - ASSESSMENT
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 coli UTIs, driveline drainage (1/7/21) and COVID-19 (back in April 2020)  He was recently seen in clinic where he complained of abdominal pain and dark stools w constipation back in May. He presents to [facility] ER today weakness and fatigue, moderate and + Black stools for three days, on coumadin secondary to warfarin use in the setting of an LVAD. Patient has required transfusions for GIB in the past. Mostly recently back in jan 2021 pt had anemia with dark stools. No interventions was done at that time. However Last Endoscopy was done in July 2020 (negative). Today labs show patient is anemic with H/H of 4.5/16.3,. INR is 8.84 MAP in the 90s, Temp 35.1. He denies any chest pain, shortness of breath, dizziness, abd pain, nausea or vomiting.       (14 Jun 2021 16:57)      Surgery/Hospital Course:  6/14 admit for melena w/ anemia, INR 8.84   6/15 Capsul study (+) for small bowel bleed, balloon endoscopy (old blood in prox ileum); post EGD - septic w/ L opacity, re-intubated for concern for aspiration, TTE (Mod MR, decrease biV w/ interventricular septum boweing towards R)  6/17 bronch   6/20 +C Diff   6/22 CT C/A/P: Fluid filled colon which may be 2/2 rapid transit. Small bilateral pleffs with associates. Compressive atelectasis New ISABELLE & LLL  parenchymal opacities, suspicious for pneumonia. Moderate stenosis in the proximal superior mesenteric artery.   6/25 #8 Shiley trach at bedside   7/1 LVAD speed increased to 9200  7/2 Bronch  7/20 TC since 7/7. Patient transferred to SDU.   7/23 INR today 2.64.  H&H 7.3/24 this AM.  Will repeat CBC at noon, and will send stool guaiac Patient with persistent abdominal tenderness, rate of tube feeds decreased.  No nausea/vomiting.    7/24 INR today 2.4. H&H 9.1/28.6 low flow overnight /N&V, refusing Tube feeds on D5 1/2 normal  @50 cc/hr  7/25 INR 2.69  H&H 7.7/25.1 refusing Tube feeds on D5 1/2 normal  @50 cc/hr. This am + BM Melena Dr Oneill HF  aware- PRBC x1  GI team consulted -  NPO  plan on study in am-  D/w Dr Cadet Patient  to return to CTU for further management; 1PRBCS   7/27 Post op INR 2.2 today.  No bleeding. BC + for SM.  Pt is hypotensive requiring pressor and inotropic support.  ID follow up today on Cefepime will follow.  7/26 R PTC for PTX   7/28 CT C/A/P: sub q emphysema in R chest wall, GGO RUL, small ascites CTH negative; Abd US: GB thickening, pericholecystic fluid    7/30 perc choley  7/31 Perchole drain in place continues to drain total output overnight 133.  Fever today 38.8   8/1 duplex LE negative   8/7 Patient with persistent abdominal pain, refusing tube feeds and medications, Psych consulted  8/13 CTA A/P ordered to r/o mesenteric ischemia 2/2 persistent anorexia, nausea, vomiting. Revealed:  Evaluation of the mesenteric vessels is limited by streak artifact from LVAD. There appears to be severe stenosis of the proximal SMA; abdominal mesenteric doppler is recommended for further evaluation. 2.  No small bowel findings to suggest acute mesenteric ischemia. 3.  Focal dissections involving the right and left common iliac arteries.  8/15: Cultures resulted BC 1/2 +GPC in clusters, SC enterobacter; mesenteric duplex: borderline stenosis of proximal SMA  8/27: CT C/A/P noncon: Nondular opacities in R lung apex w/cavitation, abd nl  8/31:  Continue current care, treatment of thyrotoxicosis with medications as per endocrine, d/c ABX as per team.   9/8 RUQ sono: Contracted gallbladder with cholecystostomy tube in place.  9/10 failed SMA stent, c/b RP bleed s/p 3U PRCB  9/12 CTA Abd/Pelvis L RP hematoma   9/24 Trach decannulated   9/26 intubated fro resp distress for increased WOB, LL pneumonia; CT C/A/P: progressive ISABELLE opacities and L consolidations, multifocal pneumonia   9/30 TTE (EF 25%, decreased RV, mod MR)   10/3 VT -> Lidocaine gtt   10/4 Trach size 6 DCT cuff  10/5 CT abd (neg for intra-abd abcess, severe stenosis of prox SMA and b/l iliac arteries)  10/18 SMA Stent   10/23 Emend for nausea   10/24 +Occult  10/29 FEES, RUQ ascites and pericholecystic fluid   Theckened liqwuids puree diet- calorie ct    Today:  Ambulation, TC, and PO diet as tolerated.  Day 2 of calorie count  Discussions yesterday as to discharge planning, agreeable to go to [facility] however will need to be decannulated and NGT removed.  Patient candidate for SDU.   11/4 Transferred to sdu.  Must be oob - chair for all meals.  Eventual change to uncuffed trach.

## 2021-11-04 NOTE — PROGRESS NOTE ADULT - PROBLEM SELECTOR PLAN 1
- ACC/AHA stage D systolic heart failure s/p LVAD   - Currently on Lopressor 12.5 mg PO BID, (MAP goal 70-80).  - Encourage ambulation, PT.

## 2021-11-04 NOTE — PROGRESS NOTE ADULT - NSSCRIBESTATEMENT_GEN_ALL_CORE
ACP (NP/PA)

## 2021-11-04 NOTE — PROGRESS NOTE ADULT - PROBLEM SELECTOR PLAN 4
- s/p trach on 10/4, currently tolerating trach collar  - after family meeting, decision to keep tracheostomy tube for now.

## 2021-11-04 NOTE — PROGRESS NOTE ADULT - PROBLEM SELECTOR PLAN 7
- currently tolerating PO pureed diet along with tube feeds, will continue to advance diet as tolerated  - undergoing calorie count (was started 11/3).

## 2021-11-04 NOTE — PROGRESS NOTE ADULT - PROBLEM SELECTOR PLAN 2
- Stable w/o evidence of LVAD malfunction, 9200 rpm, PI in 5's, stable.  - Started on ASA 81 mg PO QD. Remains off Coumadin, h/o recurrent GI bleeding  - Monitor Hb/Hct.   - LDH elevated but stable   - now DNR.

## 2021-11-04 NOTE — PROGRESS NOTE ADULT - CRITICAL CARE SERVICES PROVIDED
Critical care services provided

## 2021-11-04 NOTE — PROGRESS NOTE ADULT - ACP NAME
Agnieszka Dilip
Aroldo Wright
Cristóbal TAYLOR
Emily Roa
Emily Roa
Go TAYLOR
myself, no scribe
Agnieszka Cherry,
Agnieszka Dilip
Agnieszka Dilip
Aleksandra Olivas
Aleksandra Olivas
Cristóbal Cisneros PA-C
Go TAYLOR
Jaclyn Mendoza
Jaclyn Mendoza, NP
francisca Wright
Agnieszka Dilip
Agnieszka Dilip
Aleksandra Olivas
CLAUDIA Mejia
no scribe on this note
Agnieszka Dilip
Agnieszka Dilip
Aleksandra Olivas
Aleksandra Olivas
Daniela Chowdary
Daniela Chowdary
Jaclyn Mendoza
Jaclyn Mendoza, NP
myself, no scribe
Aroldo Wright
Cristóbal Cisneros PA-C
Daniela Chowdary
Emily Roa
Go TAYLOR
Jaclyn Mendoza
Jaclyn Mendoza
Aparna Saleh,
Cristóbal Cisneros PA-C
Agnieszka Dilip
Aleksandra Olivas
Cristóbal Cisneros PA-C
Cristóbal Cisneros PA-C
Emily Roa
Go Sellers
Jaclyn Mendoza, NP
Agnieszka Dilip
Aroldo Wright
Cristóbal Cisneros PA-C
Emily Roa
Emily Roa
Go TAYLOR
Jaclyn Mendoza
Jaclyn Mendoza
Jaclyn Mendoza, NP
Jaclyn Mendoza, NP
Agnieszka Dilip
Cristóbal Cisneros PA-C
Cristóbal Cisneros PA-C
Agnieszka Dilip
Cristóbal Cisneros PA-C
Unemployed

## 2021-11-04 NOTE — PROGRESS NOTE ADULT - PROBLEM SELECTOR PLAN 5
- Sepsis due to Enterobacter species.   - leukocytosis improved, remains afebrile   - currently on Cipro 500 mg PO QD for suppressive therapy   - last +sputum cx on 10/30 grew out strenotrophomonas maltophilia (likely colonization). blood cultures remain negative at this time  - Prior cholecystitis w/ per dawn drain with bilious output.

## 2021-11-05 NOTE — PROGRESS NOTE ADULT - ASSESSMENT
67 YO M with a history of stage D NICM s/p HM2 on 9/2017 as DT (due to severe PAD) with TV ring, prior COVID-19 infection 4/2020, recurrent syncope post LVAD s/p ILR, and chronic abdominal pain with prior negative workup who was admitted 6/14/21 with symptomatic anemia with Hgb 4.5 in setting of INR 8.8 without hemodynamic instability and has since had a protracted hospitalization. He was transfused and underwent VCE which showed active bleeding in the mid small bowel but subsequent enteroscopy 6/15 did not reveal any active bleeding. He acutely decompensated after procedure with fever/hypertension, low flow alarms, and pulmonary infiltrate with hypoxia requiring intubation from probable aspiration PNA. He was unable to extubated and has since undergone tracheostomy but tolerating persistent trach collar and nearing ability for decannulation. 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 He underwent angiogram on 9/10, stent was unable to be placed and course was then complicated by RP bleed. He continued to have persistent leukocytosis and febrile episodes and was noted to have positive sputum culture for serratia marcescens and completed his course of abx. He was initially decannulated on 9/23. Recently he started having multiples of melena, emesis and went into acte respiratory distress and was ultimately re-intubated on 9/26.     He had blood cultures positive for enterobacter cloacae/serratia and sputum positive for stenotrophomonas remains on IV antibiotics. He is s/p trach with ENT on 10/4. He underwent SMA stent x 2 on 10/18. His nausea and vomiting has improved, and he is now tolerating PO diet along with tube feeds. Fecal occult blood was noted to be positive on 10/23, he is currently off DAPT. Has been transfused with multiple units of PRBC during this admission, last on 10/23. His overall prognosis remains poor, he is now DNR after family meeting.       Problem/Plan - 1:  ·  Problem: ACC/AHA stage D systolic heart failure.   ·  Plan: - ACC/AHA stage D systolic heart failure s/p LVAD   - Currently on Lopressor 12.5 mg PO BID, (MAP goal 70-80).  - Encourage ambulation, PT.    Problem/Plan - 2:  ·  Problem: LVAD (left ventricular assist device) present.   ·  Plan: - Stable w/o evidence of LVAD malfunction, 9200 rpm, PI in 5's, stable.  - Started on ASA 81 mg PO QD. Remains off Coumadin, h/o recurrent GI bleeding  - Monitor Hb/Hct.   - LDH elevated but stable   - now DNR.    Problem/Plan - 3:  ·  Problem: Anemia, unspecified type.   ·  Plan: - Has been transfused multiple times throughout this admission, last transfusion was on 10/23  - on ASA 81mg Q24.    Problem/Plan - 4:  ·  Problem: Acute respiratory failure with hypoxia.   ·  Plan: - s/p trach on 10/4, currently tolerating trach collar  - after family meeting, decision to keep tracheostomy tube for now.    Problem/Plan - 5:  ·  Problem: Sepsis, due to unspecified organism, unspecified whether acute organ dysfunction present.   ·  Plan: - Sepsis due to Enterobacter species.   - leukocytosis improved, remains afebrile   - currently on Cipro 500 mg PO QD for suppressive therapy   - last +sputum cx on 10/30 grew out strenotrophomonas maltophilia (likely colonization). blood cultures remain negative at this time  - Prior cholecystitis w/ per dawn drain with bilious output.    Problem/Plan - 6:  ·  Problem: Superior mesenteric artery thrombosis.   ·  Plan: - S/p SMA stent 10/18, will be resumed on ASA 81 mg PO QD for recent stent. Continue to trend Hgb.    Problem/Plan - 7:  ·  Problem: Abdominal pain, unspecified abdominal location.   ·  Plan: - currently tolerating PO pureed diet along with tube feeds, will continue to advance diet as tolerated  - undergoing calorie count (was started 11/3).    Attestation Statements:   Attestation Statements:  Attending supervision statement: I have personally seen and examined the patient.  I fully participated in the care of this patient.  I have made amendments to the documentation where necessary, and agree with the history, physical exam, and plan as documented by the Fellow.     66/M with Stage D HF s/p HM 2 as DT (Severe PAD), abdominal pain 2/2 mysentric ischemia, admitted on 6/2021 for anemia with Hb 4.5 with supratheraputic INR 8.8, with hospital course complicated by resp failure s/p Trach ( retrach on 10/4) weaned off vent, Entero bacter bactremia, serratia/ stenotrophomonas Pneumonia, recurrent GI bleed, s/p SMA stent for abdominal pain, now DNR and Doesn't want to be back on vent in the setting of clinical worsening.     LVAD interrogation: HM 2, Speed 9200, FLow 4- 5's, PI, Power stable, no significant alarms noted.

## 2021-11-05 NOTE — PROGRESS NOTE ADULT - ATTENDING COMMENTS
66/M with Stage D HF s/p HM 2 as DT (Severe PAD), abdominal pain 2/2 mysentric ischemia, admitted on 6/2021 for anemia with Hb 4.5 with supratheraputic INR 8.8, with hospital course complicated by resp failure s/p Trach ( retrach on 10/4) weaned off vent, Entero bacter bactremia, serratia/ stenotrophomonas Pneumonia, recurrent GI bleed, s/p SMA stent for abdominal pain, now DNR and Doesn't want to be back on vent in the setting of clinical worsening.     LVAD interrogation: HM 2, Speed 9200, FLow 4- 5's, PI, Power stable, no significant alarms noted.    Plan:  Stage D HF/ HM 2  MAP's in goal,  Off  AC due to recurrent Bleeding  LDH stable   cont metoprolol 12.5mg BID  start spironolactone 25mg daily    GI bleeding  off AC, resumed Aspirin for SMA stent.   Cont monitor hb, if drop, will send stool occult.     Resp failure:  On trach deborah, betzaida 6 --> switch to cuffless trach   cont wean as tolerated  use PMV as tolerated    Sepsis, Enterobacter bactremia, Serratia PNA  recent Resp cx with  stenotrophomonas light growth, austen colonization.  cont cipro  repeat cultures as needed     SMA stenting, off antiplatelets due to recurrent GIB,   cont aspirin given recent stenting   monitor HB. if has recurrent hb drop, will stop aspirin    Cholecystitis - achalculous, s/p perc drain placement    Hyperthyroidism  cont methimazole and steroid janeth    Hyponatremia  nehro recs appreciated  improved , cont UreaNA    Diet:  cont tube feed at goal  cont Puree diet, calorie count to assess oral intake. should be completing today.  aspiration precaution    OOB to chair, PT/OT  transfer to Step down    family meeting held 11/3 with pt and his sister. Pt wants to have Trach and would like to go to rehab only at [facility]. the team understood his wishes and explained we will try our best to get him to rehab of his choice but he may need to look into other facilities as well.   DNR

## 2021-11-05 NOTE — PROGRESS NOTE ADULT - ASSESSMENT
65 YO M with a history of stage D NICM s/p HM2 on 9/2017 as DT (due to severe PAD) with TV ring, prior COVID-19 infection 4/2020, recurrent syncope post LVAD s/p ILR, and chronic abdominal pain with prior negative workup who was admitted 6/14/21 with symptomatic anemia with Hgb 4.5 in setting of INR 8.8 without hemodynamic instability and has since had a protracted hospitalization. He was transfused and underwent VCE which showed active bleeding in the mid small bowel but subsequent enteroscopy 6/15 did not reveal any active bleeding. He acutely decompensated after procedure with fever/hypertension, low flow alarms, and pulmonary infiltrate with hypoxia requiring intubation from probable aspiration PNA. He was unable to extubated and has since undergone tracheostomy but tolerating persistent trach collar and nearing ability for decannulation. 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 He underwent angiogram on 9/10, stent was unable to be placed and course was then complicated by RP bleed. He continued to have persistent leukocytosis and febrile episodes and was noted to have positive sputum culture for serratia marcescens and completed his course of abx. He was initially decannulated on 9/23. Recently he started having multiples of melena, emesis and went into acte respiratory distress and was ultimately re-intubated on 9/26.     He had blood cultures positive for enterobacter cloacae/serratia and sputum positive for stenotrophomonas remains on IV antibiotics. He is s/p trach with ENT on 10/4. He underwent SMA stent x 2 on 10/18. His nausea and vomiting has improved, and he is now tolerating PO diet along with tube feeds. Fecal occult blood was noted to be positive on 10/23, he is currently off DAPT. Has been transfused with multiple units of PRBC during this admission, last on 10/23. His overall prognosis remains poor, he is now DNR after family meeting.

## 2021-11-05 NOTE — PROGRESS NOTE ADULT - SUBJECTIVE AND OBJECTIVE BOX
Spoke with patient with a Hat[facility]  Wadler ID# [id number]  We discussed his desire to rescind his DNR he wants to be resuscitated if his heart stops and be put back on the ventilator if he has difficulty breathing. Tamanna Burns RN was in the room for the entire conversation.  We further discussed changing his trach to a cuffless trach and he was in agreement.   We answered all of Mr. Samaniego questions.  I will write an order to rescind his DNR/DNI          SUBJECTIVE: Slept well    REVIEW OF SYSTEMS:  CONSTITUTIONAL: No fever, weight loss, or fatigue  CARDIOLOGY: denies chest pain, shortness of breath or syncopal episodes.   RESPIRATORY: denies shortness of breath, wheezing.   NEUROLOGICAL: NO weakness, no focal deficits to report.  ENDOCRINOLOGICAL: no recent change in diabetic medications.   GI: no BRBPR, no N,V,diarrhea.    PSYCHIATRY: normal mood and affect  HEENT: no nasal discharge, no ecchymosis  SKIN: no ecchymosis, no breakdown  MUSCULOSKELETAL: Full range of motion x4.      OBJECTIVE:    ICU Vital Signs Last 24 Hrs  T(C): 36.5 (05 Nov 2021 07:00), Max: 36.6 (04 Nov 2021 20:35)  T(F): 97.7 (05 Nov 2021 07:00), Max: 97.9 (04 Nov 2021 20:35)  HR: 102 (05 Nov 2021 09:45) (96 - 120)  BP(mean): 80 (05 Nov 2021 08:00) (80 - 94)  RR: 18 (05 Nov 2021 09:45) (18 - 36)  SpO2: 100% (05 Nov 2021 09:45) (100% - 100%)    PHYSICAL EXAM:  General Appearance: well appearing, normal for age and gender. 	  Neck: normal JVP, no bruit.   Eyes: No xanthelasma, Extra ocular muscles intact.   Cardiovascular: regular rate and rhythm S1 S2, No JVD, No murmurs, No edema  Respiratory: Lungs clear to auscultation	  Psychiatry: Alert and oriented x 3, Mood & affect appropriate  Gastrointestinal:  Soft, Non-tender  Skin/Integument: No rashes, No ecchymosis, No cyanosis	  Neurologic: Non-focal  Musculoskeletal/ extremities: Normal range of motion, No clubbing, cyanosis or edema  Vascular: Peripheral pulses palpable 2+ bilaterally    MEDICATIONS  (STANDING):  aspirin  chewable 81 milliGRAM(s) Oral daily  chlorhexidine 2% Cloths 1 Application(s) Topical <User Schedule>  ciprofloxacin     Tablet 500 milliGRAM(s) Oral every 12 hours  hydrALAZINE 10 milliGRAM(s) Oral every 8 hours  insulin lispro (ADMELOG) corrective regimen sliding scale   SubCutaneous every 6 hours  magnesium oxide 400 milliGRAM(s) Oral every 12 hours  methimazole 10 milliGRAM(s) Oral daily  metoclopramide Injectable 10 milliGRAM(s) IV Push every 8 hours  metoprolol tartrate 12.5 milliGRAM(s) Oral every 8 hours  mirtazapine 7.5 milliGRAM(s) Oral at bedtime  multivitamin 1 Tablet(s) Oral daily  ondansetron Injectable 8 milliGRAM(s) IV Push every 8 hours  pantoprazole  Injectable 40 milliGRAM(s) IV Push every 12 hours  predniSONE   Tablet 1 milliGRAM(s) Oral daily  sertraline 100 milliGRAM(s) Oral daily  thiamine 100 milliGRAM(s) Oral daily  urea Oral Powder 15 Gram(s) Oral daily  vancomycin    Solution 125 milliGRAM(s) Oral every 12 hours    MEDICATIONS  (PRN):  guaiFENesin Oral Liquid (Sugar-Free) 200 milliGRAM(s) Oral every 4 hours PRN mucous plugging  simethicone 80 milliGRAM(s) Chew every 8 hours PRN Gas    	  TELEMETRY: [facility]        LABS:                                                8.5    7.94  )-----------( 293      ( 05 Nov 2021 06:59 )             26.7   11-05    137  |  99  |  16  ----------------------------<  116<H>  4.0   |  27  |  0.51    Ca    9.3      05 Nov 2021 06:59  Phos  2.6     11-04  Mg     1.8     11-04    TPro  7.7  /  Alb  3.8  /  TBili  0.4  /  DBili  x   /  AST  26  /  ALT  42  /  AlkPhos  174<H>  11-05          I&O's Summary    I&O's Summary    04 Nov 2021 07:01  -  05 Nov 2021 07:00  --------------------------------------------------------  IN: 1250 mL / OUT: 470 mL / NET: 780 mL    05 Nov 2021 07:01  -  05 Nov 2021 10:27  --------------------------------------------------------  IN: 0 mL / OUT: 250 mL / NET: -250 mL

## 2021-11-05 NOTE — PROGRESS NOTE ADULT - ASSESSMENT
Assessment and Recommendation:   · Assessment	  Assessment and recommendation :  Recurrent Acute hypoxic respiratory Failure  FI02 is 40% S/P tracheostomy  on track. collar   Acute blood loss anemia S/P multiple  blood transfusion post tracheostomy   start Decannulation to cuffless trach.  recurrent  septic shock  weaned off  vasopressin   sepsis   on Cipro tablets   po vancomycin   C-Dificle colitis on po vancomycin   S/P cholecystostomy tube placement by IR    to repeat Abdominal ultra sound  patient is S/P  repeat vacular procedure and SMA stent   AF RVR back to regular sinus Rhythm   Non ischemic cardiomyopathy continue ACE inhibitor and B-Blockers   hyponatremia fluid restriction   S/P 3 unit of blood transfusion   Stage D systolic heart failure S/P LVAD HM2   MH2 LVAD  with  TV Annuloplasty  Severe peripheral vascular disease   severe hyperglycemia on insulin coverage    Reglan 10 mg for Gastric Motility   hyperthyroidism on Methimazole 10mg TID   critical care polyneuropathy   Anemia of Acute blood Loss   severe protein caloric malnutrition   magnesium supplement keep above 2   Chronic kidney disease stage III  Depressed and withdrawn   on  NG tube feeding and PO trial  GI prophylaxis with PPI

## 2021-11-05 NOTE — PROGRESS NOTE ADULT - SUBJECTIVE AND OBJECTIVE BOX
VITAL SIGNS    Telemetry:    Vital Signs Last 24 Hrs  T(C): 36.5 (21 @ 07:00), Max: 36.6 (21 @ 20:35)  T(F): 97.7 (21 @ 07:00), Max: 97.9 (21 @ 20:35)  HR: 96 (21 @ 08:00) (96 - 120)  BP: --  RR: 18 (21 @ 06:18) (18 - 36)  SpO2: 100% (21 @ 08:00) (100% - 100%)            :01  -   @ 07:00  --------------------------------------------------------  IN: 1250 mL / OUT: 470 mL / NET: 780 mL    :01  -   @ 09:39  --------------------------------------------------------  IN: 0 mL / OUT: 250 mL / NET: -250 mL       Daily     Daily Weight in k (2021 07:00)  Admit Wt: Drug Dosing Weight  Height (cm): 177.8 (18 Oct 2021 15:55)  Weight (kg): 61.6 (18 Oct 2021 15:55)  BMI (kg/m2): 19.5 (18 Oct 2021 15:55)  BSA (m2): 1.77 (18 Oct 2021 15:55)    Bilirubin Total, Serum: 0.4 mg/dL ( @ 06:59)    CAPILLARY BLOOD GLUCOSE      POCT Blood Glucose.: 117 mg/dL (2021 06:32)  POCT Blood Glucose.: 140 mg/dL (2021 00:34)  POCT Blood Glucose.: 124 mg/dL (2021 18:14)  POCT Blood Glucose.: 145 mg/dL (2021 11:19)          aspirin  chewable 81 milliGRAM(s) Oral daily  chlorhexidine 2% Cloths 1 Application(s) Topical <User Schedule>  ciprofloxacin     Tablet 500 milliGRAM(s) Oral every 12 hours  guaiFENesin Oral Liquid (Sugar-Free) 200 milliGRAM(s) Oral every 4 hours PRN  hydrALAZINE 10 milliGRAM(s) Oral every 8 hours  insulin lispro (ADMELOG) corrective regimen sliding scale   SubCutaneous every 6 hours  magnesium oxide 400 milliGRAM(s) Oral every 12 hours  methimazole 10 milliGRAM(s) Oral daily  metoclopramide Injectable 10 milliGRAM(s) IV Push every 8 hours  metoprolol tartrate 12.5 milliGRAM(s) Oral every 8 hours  mirtazapine 7.5 milliGRAM(s) Oral at bedtime  multivitamin 1 Tablet(s) Oral daily  ondansetron Injectable 8 milliGRAM(s) IV Push every 8 hours  pantoprazole  Injectable 40 milliGRAM(s) IV Push every 12 hours  predniSONE   Tablet 1 milliGRAM(s) Oral daily  sertraline 100 milliGRAM(s) Oral daily  simethicone 80 milliGRAM(s) Chew every 8 hours PRN  thiamine 100 milliGRAM(s) Oral daily  urea Oral Powder 15 Gram(s) Oral daily  vancomycin    Solution 125 milliGRAM(s) Oral every 12 hours      PHYSICAL EXAM    Subjective: "Hi.   Neurology: alert and oriented x 3, nonfocal, no gross deficits  CV : tele:  RSR  Sternal Wound :  CDI with dressing , Stable  Lungs: clear. RR easy, unlabored   Abdomen: soft, nontender, nondistended, positive bowel sounds, bowel movement   Neg N/V/D   :  pt voiding without difficulty   Extremities:   BLANDON; edema, neg calf tenderness.   PPP bilaterally      PW:  Chest tubes:                 VITAL SIGNS    Telemetry:  rsr/ st 's   Vital Signs Last 24 Hrs  T(C): 36.5 (21 @ 07:00), Max: 36.6 (21 @ 20:35)  T(F): 97.7 (21 @ 07:00), Max: 97.9 (21 @ 20:35)  HR: 96 (21 @ 08:00) (96 - 120)  BP: --  RR: 18 (21 @ 06:18) (18 - 36)  SpO2: 100% (21 @ 08:00) (100% - 100%)             07:01  -   @ 07:00  --------------------------------------------------------  IN: 1250 mL / OUT: 470 mL / NET: 780 mL     07:01  -   @ 09:39  --------------------------------------------------------  IN: 0 mL / OUT: 250 mL / NET: -250 mL       Daily     Daily Weight in k (2021 07:00)  Admit Wt: Drug Dosing Weight  Height (cm): 177.8 (18 Oct 2021 15:55)  Weight (kg): 61.6 (18 Oct 2021 15:55)  BMI (kg/m2): 19.5 (18 Oct 2021 15:55)  BSA (m2): 1.77 (18 Oct 2021 15:55)    Bilirubin Total, Serum: 0.4 mg/dL ( @ 06:59)    CAPILLARY BLOOD GLUCOSE      POCT Blood Glucose.: 117 mg/dL (2021 06:32)  POCT Blood Glucose.: 140 mg/dL (2021 00:34)  POCT Blood Glucose.: 124 mg/dL (2021 18:14)  POCT Blood Glucose.: 145 mg/dL (2021 11:19)          aspirin  chewable 81 milliGRAM(s) Oral daily  chlorhexidine 2% Cloths 1 Application(s) Topical <User Schedule>  ciprofloxacin     Tablet 500 milliGRAM(s) Oral every 12 hours  guaiFENesin Oral Liquid (Sugar-Free) 200 milliGRAM(s) Oral every 4 hours PRN  hydrALAZINE 10 milliGRAM(s) Oral every 8 hours  insulin lispro (ADMELOG) corrective regimen sliding scale   SubCutaneous every 6 hours  magnesium oxide 400 milliGRAM(s) Oral every 12 hours  methimazole 10 milliGRAM(s) Oral daily  metoclopramide Injectable 10 milliGRAM(s) IV Push every 8 hours  metoprolol tartrate 12.5 milliGRAM(s) Oral every 8 hours  mirtazapine 7.5 milliGRAM(s) Oral at bedtime  multivitamin 1 Tablet(s) Oral daily  ondansetron Injectable 8 milliGRAM(s) IV Push every 8 hours  pantoprazole  Injectable 40 milliGRAM(s) IV Push every 12 hours  predniSONE   Tablet 1 milliGRAM(s) Oral daily  sertraline 100 milliGRAM(s) Oral daily  simethicone 80 milliGRAM(s) Chew every 8 hours PRN  thiamine 100 milliGRAM(s) Oral daily  urea Oral Powder 15 Gram(s) Oral daily  vancomycin    Solution 125 milliGRAM(s) Oral every 12 hours      PHYSICAL EXAM    Subjective: "Hi."   Neurology: alert and oriented, nonfocal, no gross deficits  CV : tele:   rsr/ st 's; typical vad hum   Sternal Wound :  CDI TOM- well healed   Lungs: + bilateral rhonchi noted; trach changed to #6 cuffless shiley this am; suctioning q2-3 hours; RR easy, unlabored   Abdomen: soft, nontender, nondistended, positive bowel sounds, + bowel movement   Neg N/V/D ; driveline cdi w/ dressing; + kaofeed tf vital @ 50 cc/ hr ; + choly drain    :  pt voiding without difficulty   Extremities:   BLANDON; neg LE edema, neg calf tenderness.   PPP bilaterally; +RUE midline cdi w/ dressing; + LUE staples cdi tom

## 2021-11-05 NOTE — PROGRESS NOTE ADULT - SUBJECTIVE AND OBJECTIVE BOX
RICKY JOINT  MRN#: [id number]  Subjective:  Pulmonary progress  : recurrent Acute hypoxic respiratory Failure ,aspiration pneumonia, NICM  ,   65M PMH ACC/AHA stage D HF due to NICM HM2 LVAD , TV annuloplasty ring 17 as destination therapy due to severe peripheral artery disease with significant stenosis  SIADH, Depression, CKD-3 with hyperkalemia, past E. coli UTIs, driveline drainage (21) and COVID-19 (back in 2020)  He was recently seen in clinic where he complained of abdominal pain and dark stools w constipation back in May. He presents to [facility] ER today weakness and fatigue, moderate and + Black stools for three days, on coumadin secondary to warfarin use in the setting of an LVAD. Patient has required transfusions for GIB in the past. Mostly recently back in 2021 pt had anemia with dark stools. No interventions was done at that time. However Last Endoscopy was done in 2020 (negative). Today labs show patient is anemic with H/H of 4.5/16.3,. INR is 8.84 MAP in the 90s, Temp 35.1. He denies any chest pain, shortness of breath, dizziness, abd pain, nausea or vomiting. found to have  rectal bleeding underwent endoscopy ,old blood in the proximal ileum ,  develop sepsis with LL opacity given Antibiotics , Extubated , reintubated , Bronchoscopy on Zosyn for LL pneumonia  and Amiodarone S/P TV Annuloplasty , patient remain intubated on full ventilatory support .S/P multiple units of blood transfusion , remain on full ventilatory support on Precedex and propofol , new central IJ line , diarrhea C diff. +ve on po vancomycin and IV Flagyl,  mildly distended belly , fever start on cefepime 2gm q 8 hrs S/P tracheostomy .  new RT Subclavian central line continue on contact  isolation ,S/P  C-diff antibiotics, no more diarrhea back on full support mechanical ventilator , chest x ray show improvement in LLL air space disease, more awake and responsive on tube feeding no more diarrhea ,  no nausea or vomiting or diarrhea still very weak and tired , back on tube feeding ,still on po vancomycin , getting PT and OT at bed side ,   , no SOB getting stronger , improve muscle tone patient transfer to monitor bed still on contact isolation for C-Difficel colitis on 50% FI02, and change to Suresh  tube feeding still loose stool . H/H drop significantly require blood transfusion , most likely GI bleeding , IV heparin D/C ,  H/H is stable ., patient develop TR sided  pneumothorax require chest tube placement , RT IJ central line  placed , develop fever shaking chills , blood culture positive for serratia marcescens , start on cefepime .the patient  become hypoxic and hypotensive placed on full ventilatory support and Vasopressin , levophed and Dobutamine ,S/P blood transfusion on meropenem and vancomycin ,   , on and  off pressors , occasional agitation on Precedex .S/P IR cholecystostomy tube drainage placement in the RT upper Quadrant , resume anticoagulation chest x ray noted C-PAP trail lasted only for 2 hrs , new RT SC line and D/C RT IJ line , RT pig tail cathter has been removed , tolerating C-PAP trial placed on trach. collar 50% FI02 GI consultant noted on NG tube feeding as tolerated , develop AF RVR S/P  bolus Amiodarone  back to regular sinus Rhythm , Flat Affect depressed , back on tube feeding Vital AF at 60 cc/ hr .still intermittent abdominal pain , no fever saturation is accepted  back on full ventilatory support ,   on methimazole for hyperthyroidism ,  condition is the same ,still C/O Abdominal pain , white count is improving , no chest pain or SOB ,  .placed on Reglan 10 mg TID for gastric motility , depressed , withdrawn. , S/P  mesenteric angiogram , unable to stent SMA S/P 3 units of blood transfusion   RT IJ in place reassessed for stent in the SMA by vascular,  dark stool stable H/H ,surgical opinion for possible Lap cholecystectomy. over night events noted develop respiratory distress, , rectal bleeding, require intubation placed on full ventilatory support , FI02 is 50% also became hemianosmically unstable require triple pressor support with levophed , vasopressin and norsynephrine, pan culture placed  on Vancomycin IV and PO  and Zosyn, sedated with propofol kept NPO , new central line RT and left IJ cathter placed . Diflucan Added .fever of 38.5 weaned off  vasopressin ,   fever adding IV vancomycin and  vancomycin solution  , and Bactrim , S/P  tracheostomy , bleeding receiving blood transfusion on vasopressin , no more bleeding , no fever , more awake and responsive ,tolerating tube feeding , ID and heart failure follow up appreciated , depresses no weaning for now , magnesium is low to give supplement too keep Above 2 , patient S/P  vascular procedure for superior mesenteric artery occlusion S/P 2 stent placement , patient tolerate it very well  , left upper extremities hematoma .vascular follow up appreciated , increase WBC , new RT UPE midline , black tarry stool , very loose R/O recurrent GI bleeding and C-dificile  colitis stable H/H no events over night , diarrhea is improved ,  WBC is improving  , hyponatremia no change , S/P FEEST study result is pending , PO feeding with observation . on Cipro antibiotics  no major over night events , worsening LFTs for repeat Abdominal sonogram pending , still diarrhea .but less , trial of po feeding , on monitor bed .start decannulation to cuffless trach.     (2021 16:57)    PAST MEDICAL & SURGICAL HISTORY:  CHF (congestive heart failure)    CAD (coronary artery disease)  Depression    Pleural effusion    History of 2019 novel coronavirus disease (COVID-19)  2020    Hemorrhoids    Bleeding hemorrhoids    Peripheral arterial disease    Claudication    BPH with urinary obstruction    ACC/AHA stage D systolic heart failure  Anticoagulation goal of INR 2.0 to 2.5    Falls    Clavicle fracture    CKD (chronic kidney disease), stage III    Iron deficiency anemia    H/O epistaxis    Vertigo    GI bleed    S/P TVR (tricuspid valve repair)    S/P ventricular assist device    S/P endoscopy    OBJECTIVE:  ICU Vital Signs Last 24 Hrs  T(C): 38.2 (2021 10:00), Max: 38.5 (2021 12:00)  T(F): 100.8 (2021 10:00), Max: 101.3 (2021 12:00)  HR: 65 (2021 10:00) (61 - 69)  BP: --  BP(mean): --  ABP: 105/67 (2021 10:00) (90/54 - 113/64)  ABP(mean): 77 (2021 10:00) (63 - 77)  RR: 20 (2021 10:00) (19 - 35)  SpO2: 99% (2021 10:00) (96% - 100%)      -19 @ 07:01  -  06-20 @ 07:00  --------------------------------------------------------  IN: 2693.9 mL / OUT: 1415 mL / NET: 1278.9 mL     @ 07: @ 10:49  --------------------------------------------------------  IN: 420.8 mL / OUT: 115 mL / NET: 305.8 mL  PHYSICAL EXAM: Daily   Elderly male  on trach. collar  FI02 is 40% S/P tracheostomy   Daily Weight in k.4 (2021 00:00)  HEENT:     + NCAT  + EOMI  - Conjuctival edema   - Icterus   - Thrush   - ETT  + NGT/OGT  Neck:         + FROM  + JVD  - Nodes - Masses + Mid-line trachea + Tracheostomy  Chest:            normal A-P diameter    Lungs:          + CTA   + Rhonchi    - Rales    - Wheezing + Decreased  LT BS   - Dullness R L  Cardiac:       + S1 + S2    + RRR   - Irregular   - S3  - S4    - Murmurs   - Rub   - Hamman’s sign   Abdomen:    + BS  + Soft + Non-tender - Distended - Organomegaly - PEG .cholecystostomy tube in place  Extremities:   - Cyanosis U/L   - Clubbing  U/L  + LE/UE Edema LUE hematoma   + Capillary refill    + Pulses   Neuro:        - Awake   -  Alert   - Confused   - Lethargic   + Sedated  + Generalized Weakness  Skin:        - Rashes    - Erythema   + Normal incisions   + IV sites intact          HOSPITAL MEDICATIONS: All medications reviewed and analyzed  MEDICATIONS  (STANDING):  amiodarone    Tablet 200 milliGRAM(s) Oral daily  chlorhexidine 0.12% Liquid 15 milliLiter(s) Oral Mucosa every 12 hours  chlorhexidine 2% Cloths 1 Application(s) Topical <User Schedule>  dexmedetomidine Infusion 0.5 MICROgram(s)/kG/Hr (9.81 mL/Hr) IV Continuous <Continuous>  dextrose 50% Injectable 50 milliLiter(s) IV Push every 15 minutes  heparin  Infusion 400 Unit(s)/Hr (12.5 mL/Hr) IV Continuous <Continuous>  Hydromorphone  Injectable 0.5 milliGRAM(s) IV Push once  insulin lispro (ADMELOG) corrective regimen sliding scale   SubCutaneous every 6 hours  pantoprazole  Injectable 40 milliGRAM(s) IV Push every 12 hours  piperacillin/tazobactam IVPB.. 3.375 Gram(s) IV Intermittent every 8 hours  propofol Infusion 20 MICROgram(s)/kG/Min (9.42 mL/Hr) IV Continuous <Continuous>  sodium chloride 0.9% lock flush 3 milliLiter(s) IV Push every 8 hours  sodium chloride 0.9%. 1000 milliLiter(s) (10 mL/Hr) IV Continuous <Continuous>    MEDICATIONS  (PRN):  acetaminophen    Suspension .. 650 milliGRAM(s) Oral every 6 hours PRN Temp greater or equal to 38C (100.4F)    LABS: All Lab data reviewed and analyzed                                      8.5    7.94  )-----------( 293      ( 2021 06:59 )             26.7    11-    137  |  99  |  16  ----------------------------<  116<H>  4.0   |  27  |  0.51    Ca    9.3      2021 06:59  Phos  2.6     11-04  Mg     1.8     -    TPro  7.7  /  Alb  3.8  /  TBili  0.4  /  DBili  x   /  AST  26  /  ALT  42  /  AlkPhos  174<H>        Ca    9.2      2021 00:30  Phos  2.6     11-04  Mg     1.8     -    TPro  7.5  /  Alb  3.6  /  TBili  0.4  /  DBili  x   /  AST  27  /  ALT  57<H>  /  AlkPhos  188<H>      Ca    9.4      2021 00:44  Phos  2.5     11-03  Mg     2.0     11-    TPro  7.8  /  Alb  3.6  /  TBili  0.4  /  DBili  x   /  AST  37  /  ALT  73<H>  /  AlkPhos  210<H>                                                                                                                                                                                           PTT - ( 2021 04:52 )  PTT:45.2 sec LIVER FUNCTIONS - ( 2021 00:42 )  Alb: 3.4 g/dL / Pro: 6.7 g/dL / ALK PHOS: 213 U/L / ALT: 15 U/L / AST: 24 U/L / GGT: x           RADIOLOGY: - Reviewed and analyzed  , LVAD HM2, CT scan of abdomen reviewed result noted

## 2021-11-05 NOTE — PROGRESS NOTE ADULT - PROBLEM SELECTOR PROBLEM 4
"  [address] [address]  [phone]  Dept: [phone]    PRE-OP EVALUATION:  Today's date: 2018    Maryam Saavedra (: 1931) presents for pre-operative evaluation assessment as requested by Jimbo Whitt MD.  She requires evaluation and anesthesia risk assessment prior to undergoing surgery/procedure for treatment of *** .    Proposed Surgery/ Procedure: ARTHROPLASTY HIP ANTERIOR, Right  Date of Surgery/ Procedure: 2018  Time of Surgery/ Procedure: 1:00pm  Hospital/Surgical Facility:  OR  {SURGERY FAX NUMBER:[phone]::\"Fax number for surgical facility: ***\"}  Primary Physician: Truman Norman  Type of Anesthesia Anticipated: General    Patient has a Health Care Directive or Living Will:  {YES/NO:424058::\"NO\"}    {PREOP QUESTIONNAIRE OPTIONS(by MA):652403::\"1. NO - Do you have a history of heart attack, stroke, stent, bypass or surgery on an artery in the head, neck, heart or legs?\",\"2. NO - Do you ever have any pain or discomfort in your chest?\",\"3. NO - Do you have a history of  Heart Failure?\",\"4. NO - Are you troubled by shortness of breath when: walking on the level, up a slight hill or at night?\",\"5. NO - Do you currently have a cold, bronchitis or other respiratory infection?\",\"6. NO - Do you have a cough, shortness of breath or wheezing?\",\"7. NO - Do you sometimes get pains in the calves of your legs when you walk?\",\"8. NO - Do you or anyone in your family have previous history of blood clots?\",\"9. NO - Do you or does anyone in your family have a serious bleeding problem such as prolonged bleeding following surgeries or cuts?\",\"10. NO - Have you ever had problems with anemia or been told to take iron pills?\",\"11. NO - Have you had any abnormal blood loss such as black, tarry or bloody stools, or abnormal vaginal bleeding?\",\"12. NO - Have you ever had a blood transfusion?\",\"13. NO - Have you or any of your relatives ever had problems " "with anesthesia?\",\"14. NO - Do you have sleep apnea, excessive snoring or daytime drowsiness?\",\"15. NO - Do you have any prosthetic heart valves?\",\"16. NO - Do you have prosthetic joints?\",\"17. NO - Is there any chance that you may be pregnant?\"}      HPI:     HPI related to upcoming procedure: ***      {. Problems:856153}    MEDICAL HISTORY:     Patient Active Problem List    Diagnosis Date Noted     Unsteady gait 11/25/2017     Priority: Medium     Acute right-sided low back pain without sciatica 09/18/2016     Priority: Medium     Benign essential hypertension 09/01/2016     Priority: Medium     Impaired fasting glucose 09/01/2016     Priority: Medium      Past Medical History:   Diagnosis Date     Benign essential hypertension 9/1/2016     Chronic low back pain      Hypertension      Impaired fasting glucose     borderline     Osteoarthritis      Tricuspid regurgitation      Past Surgical History:   Procedure Laterality Date     EYE SURGERY  cataracts     MOHS MICROGRAPHIC PROCEDURE      Left lower eyelid      MOHS MICROGRAPHIC PROCEDURE Left 10/27/2016    Procedure: MOHS MICROGRAPHIC PROCEDURE;  Surgeon: Jose Torrez MD;  Location: [facility]     MOHS MICROGRAPHIC PROCEDURE Right 5/24/2018    Procedure: MOHS MICROGRAPHIC PROCEDURE;  RIGHT MEDIAL CANTHUS MOHS CLOSURE;  Surgeon: Jose Torrez MD;  Location: [facility]     ORTHOPEDIC SURGERY      bilateral wrists     Current Outpatient Prescriptions   Medication Sig Dispense Refill     amLODIPine (NORVASC) 2.5 MG tablet TAKE 1 TABLET BY MOUTH EVERY DAY 90 tablet 2     aspirin 81 MG chewable tablet Take 2 tablets (162 mg) by mouth daily       CEPHALEXIN PO Take 1 tablet by mouth 3 times daily        erythromycin (ROMYCIN) ophthalmic ointment Apply to skin incisions three times daily and into the eye at bedtime. 3.5 g 0     HYDROcodone-acetaminophen (NORCO) 5-325 MG per tablet Take 1 tablet by mouth every 6 hours as needed for pain Maximum of 4000 mg of " "acetaminophen in 24 hours. 10 tablet 0     metoprolol succinate (TOPROL-XL) 50 MG 24 hr tablet TAKE 1/2 TABLET BY MOUTH ONCE A DAY 45 tablet 2     triamterene-hydrochlorothiazide (MAXZIDE-25) 37.5-25 MG per tablet TAKE 1/2 TABLET BY MOUTH ONCE A DAY 45 tablet 1     OTC products: {OTC ANALGESICS:162718}    Allergies   Allergen Reactions     Ace Inhibitors      Angioedema       Cyclobenzaprine      Angioedema      Latex Allergy: {YES/NO WITH DEFAULT:271929::\"NO\"}    Social History   Substance Use Topics     Smoking status: Former Smoker     Smokeless tobacco: Never Used     Alcohol use 0.0 oz/week     0 Standard drinks or equivalent per week      Comment: occasional     History   Drug Use No       REVIEW OF SYSTEMS:   {ROS Preop Choices:330917}    EXAM:   There were no vitals taken for this visit.  {EXAM Preop Choices:217910}    DIAGNOSTICS:   {DIAGNOSTIC FOR PREOP:509194}    Recent Labs   Lab Test  05/14/18   1037  11/26/17   0702  11/25/17   1846  09/08/17   0900   HGB  13.3  11.6*  11.6*  12.6   PLT   --   217  236  251   NA  140  142  141  142   POTASSIUM  4.0  3.5  3.7  4.4   CR  0.85  0.74  0.80  0.84   A1C  5.9*   --    --   5.4        IMPRESSION:   {PREOP REASONS:626892::\"Reason for surgery/procedure: ***\",\"Diagnosis/reason for consult: ***\"}    The proposed surgical procedure is considered {HIGH=major cardiovascular or procedures requiring prolonged anesthesia >4 hours or large fluid shifts;    INTERMEDIATE=abdominal, most orthopedic and intrathoracic surgery; LOW= endoscopy, cataract and breast surgery:826052} risk.    REVISED CARDIAC RISK INDEX  The patient has the following serious cardiovascular risks for perioperative complications such as (MI, PE, VFib and 3  AV Block):  {PREOP REVISED CARDIAC INDEX (RCI):958618:p:\"No serious cardiac risks\"}  INTERPRETATION: {REVISED CARDIAC RISK INTERPRETATION:923236}    The patient has the following additional risks for perioperative complications:  {Additional " "perioperative risks:414527:p:\"No identified additional risks\"}      ICD-10-CM    1. Preop general physical exam Z01.818        RECOMMENDATIONS:     {IMPORTANT - Conditions - complete carefully!!:810508}    {IMPORTANT - Medications:220199::\"--Patient is to take all scheduled medications on the day of surgery EXCEPT for modifications listed below.\"}    {IMPORTANT - Approval:821674:p:\"APPROVAL GIVEN to proceed with proposed procedure, without further diagnostic evaluation\"}       Signed Electronically by: Truman Norman MD    Copy of this evaluation report is provided to requesting physician.    [facility] Preop Guidelines    Revised Cardiac Risk Index  " Superior mesenteric artery thrombosis

## 2021-11-05 NOTE — CHART NOTE - NSCHARTNOTEFT_GEN_A_CORE
Spoke with patient with a [facility]  :Ariadnar ID# [id number]  We discussed his desire to rescind his DNR he wants to be resuscitated if his heart stops and be put back on the ventilator if he has difficulty breathing. Tamanna Burns RN was in the room for the entire conversation.  We further discussed changing his trach to a cuffless trach and he was in agreement.   We answered all of Mr. Samaniego questions.  I will write an order to rescind his DNR/DNI          SUBJECTIVE: Slept well    REVIEW OF SYSTEMS:  CONSTITUTIONAL: No fever, weight loss, or fatigue  CARDIOLOGY: denies chest pain, shortness of breath or syncopal episodes.   RESPIRATORY: denies shortness of breath, wheezing.   NEUROLOGICAL: NO weakness, no focal deficits to report.  ENDOCRINOLOGICAL: no recent change in diabetic medications.   GI: no BRBPR, no N,V,diarrhea.    PSYCHIATRY: normal mood and affect  HEENT: no nasal discharge, no ecchymosis  SKIN: no ecchymosis, no breakdown  MUSCULOSKELETAL: Full range of motion x4.      OBJECTIVE:    ICU Vital Signs Last 24 Hrs  T(C): 36.5 (05 Nov 2021 07:00), Max: 36.6 (04 Nov 2021 20:35)  T(F): 97.7 (05 Nov 2021 07:00), Max: 97.9 (04 Nov 2021 20:35)  HR: 102 (05 Nov 2021 09:45) (96 - 120)  BP(mean): 80 (05 Nov 2021 08:00) (80 - 94)  RR: 18 (05 Nov 2021 09:45) (18 - 36)  SpO2: 100% (05 Nov 2021 09:45) (100% - 100%)    PHYSICAL EXAM:  General Appearance: well appearing, normal for age and gender. 	  Neck: normal JVP, no bruit.   Eyes: No xanthelasma, Extra ocular muscles intact.   Cardiovascular: regular rate and rhythm S1 S2, No JVD, No murmurs, No edema  Respiratory: Lungs clear to auscultation	  Psychiatry: Alert and oriented x 3, Mood & affect appropriate  Gastrointestinal:  Soft, Non-tender  Skin/Integument: No rashes, No ecchymosis, No cyanosis	  Neurologic: Non-focal  Musculoskeletal/ extremities: Normal range of motion, No clubbing, cyanosis or edema  Vascular: Peripheral pulses palpable 2+ bilaterally    MEDICATIONS  (STANDING):  aspirin  chewable 81 milliGRAM(s) Oral daily  chlorhexidine 2% Cloths 1 Application(s) Topical <User Schedule>  ciprofloxacin     Tablet 500 milliGRAM(s) Oral every 12 hours  hydrALAZINE 10 milliGRAM(s) Oral every 8 hours  insulin lispro (ADMELOG) corrective regimen sliding scale   SubCutaneous every 6 hours  magnesium oxide 400 milliGRAM(s) Oral every 12 hours  methimazole 10 milliGRAM(s) Oral daily  metoclopramide Injectable 10 milliGRAM(s) IV Push every 8 hours  metoprolol tartrate 12.5 milliGRAM(s) Oral every 8 hours  mirtazapine 7.5 milliGRAM(s) Oral at bedtime  multivitamin 1 Tablet(s) Oral daily  ondansetron Injectable 8 milliGRAM(s) IV Push every 8 hours  pantoprazole  Injectable 40 milliGRAM(s) IV Push every 12 hours  predniSONE   Tablet 1 milliGRAM(s) Oral daily  sertraline 100 milliGRAM(s) Oral daily  thiamine 100 milliGRAM(s) Oral daily  urea Oral Powder 15 Gram(s) Oral daily  vancomycin    Solution 125 milliGRAM(s) Oral every 12 hours    MEDICATIONS  (PRN):  guaiFENesin Oral Liquid (Sugar-Free) 200 milliGRAM(s) Oral every 4 hours PRN mucous plugging  simethicone 80 milliGRAM(s) Chew every 8 hours PRN Gas    	  TELEMETRY:     LABS:                                             8.5    7.94  )-----------( 293      ( 05 Nov 2021 06:59 )             26.7   11-05    137  |  99  |  16  ----------------------------<  116<H>  4.0   |  27  |  0.51    Ca    9.3      05 Nov 2021 06:59  Phos  2.6     11-04  Mg     1.8     11-04    TPro  7.7  /  Alb  3.8  /  TBili  0.4  /  DBili  x   /  AST  26  /  ALT  42  /  AlkPhos  174<H>  11-05    LVAD Interrogation: PI events nothing since 11/4  Pump Flow: 4.7  Pump Speed: 9200  Pulse Index: 6.3  Pump Power: 5.3  Driveline evaluation: C/D/I   Programming Changes: [X ] No changes made [ ] Changes made:

## 2021-11-05 NOTE — PROGRESS NOTE ADULT - SUBJECTIVE AND OBJECTIVE BOX
ENT ISSUE/POD: dysphagia     HPI: Patient is a 66 year old male with PMH of stage D NICM s/p HM2 on 9/2017 as DT (due to severe PAD) with TV ring, prior COVID-19 infection 4/2020, recurrent syncope post LVAD s/p ILR, and chronic abdominal pain with prior negative workup who was admitted 6/14/21 with symptomatic anemia with Hgb 4.5 in setting of INR 8.8 without hemodynamic instability and has since had a prolonged hospital course. trached 10/4, currently with #[id number] with speaking valve. ENT was called for trach change. Pt denies SOB, sore throat.           PAST MEDICAL & SURGICAL HISTORY:  CHF (congestive heart failure)    CAD (coronary artery disease)    Depression    Pleural effusion    History of 2019 novel coronavirus disease (COVID-19)  april 2020    Hemorrhoids    Bleeding hemorrhoids    Peripheral arterial disease    Claudication    BPH with urinary obstruction    ACC/AHA stage D systolic heart failure    Anticoagulation goal of INR 2.0 to 2.5    Falls    Clavicle fracture    CKD (chronic kidney disease), stage III    Iron deficiency anemia    H/O epistaxis    Vertigo    GI bleed    S/P TVR (tricuspid valve repair)    S/P ventricular assist device    S/P endoscopy      Allergies    Amiodarone Hydrochloride (Other (Severe))    Intolerances      MEDICATIONS  (STANDING):  aspirin  chewable 81 milliGRAM(s) Oral daily  chlorhexidine 2% Cloths 1 Application(s) Topical <User Schedule>  ciprofloxacin     Tablet 500 milliGRAM(s) Oral every 12 hours  hydrALAZINE 10 milliGRAM(s) Oral every 8 hours  insulin lispro (ADMELOG) corrective regimen sliding scale   SubCutaneous every 6 hours  magnesium oxide 400 milliGRAM(s) Oral every 12 hours  methimazole 10 milliGRAM(s) Oral daily  metoclopramide Injectable 10 milliGRAM(s) IV Push every 8 hours  metoprolol tartrate 12.5 milliGRAM(s) Oral every 8 hours  mirtazapine 7.5 milliGRAM(s) Oral at bedtime  multivitamin 1 Tablet(s) Oral daily  ondansetron Injectable 8 milliGRAM(s) IV Push every 8 hours  pantoprazole  Injectable 40 milliGRAM(s) IV Push every 12 hours  predniSONE   Tablet 1 milliGRAM(s) Oral daily  sertraline 100 milliGRAM(s) Oral daily  thiamine 100 milliGRAM(s) Oral daily  urea Oral Powder 15 Gram(s) Oral daily  vancomycin    Solution 125 milliGRAM(s) Oral every 12 hours    MEDICATIONS  (PRN):  guaiFENesin Oral Liquid (Sugar-Free) 200 milliGRAM(s) Oral every 4 hours PRN mucous plugging  simethicone 80 milliGRAM(s) Chew every 8 hours PRN Gas      Social History: see consult    Family history: see consult    ROS:   ENT: all negative except as noted in HPI   Pulm: denies SOB, cough, hemoptysis  Neuro: denies numbness/tingling, loss of sensation  Endo: denies heat/cold intolerance, excessive sweating      Vital Signs Last 24 Hrs  T(C): 36.5 (05 Nov 2021 07:00), Max: 36.6 (04 Nov 2021 20:35)  T(F): 97.7 (05 Nov 2021 07:00), Max: 97.9 (04 Nov 2021 20:35)  HR: 118 (05 Nov 2021 11:00) (96 - 120)  BP: --  BP(mean): 80 (05 Nov 2021 08:00) (80 - 94)  RR: 18 (05 Nov 2021 09:45) (18 - 36)  SpO2: 99% (05 Nov 2021 11:00) (99% - 100%)                          8.5    7.94  )-----------( 293      ( 05 Nov 2021 06:59 )             26.7    11-05    137  |  99  |  16  ----------------------------<  116<H>  4.0   |  27  |  0.51    Ca    9.3      05 Nov 2021 06:59  Phos  2.6     11-04  Mg     1.8     11-04    TPro  7.7  /  Alb  3.8  /  TBili  0.4  /  DBili  x   /  AST  26  /  ALT  42  /  AlkPhos  174<H>  11-05       PHYSICAL EXAM:  Gen: NAD  Skin: No rashes, bruises, or lesions  Head: Normocephalic, Atraumatic  Face: no edema, erythema, or fluctuance. Parotid glands soft without mass  Eyes: no scleral injection  Nose: Nares bilaterally patent, no discharge  Mouth: No Stridor / Drooling / Trismus.  Mucosa moist, tongue/uvula midline, oropharynx clear  Neck: #6 DCT in place with speaking valve. Flat, supple, no lymphadenopathy, trachea midline, no masses  Lymphatic: No lymphadenopathy  Resp: on trach collar   Neuro: facial nerve intact, no facial droop      Procedure: Trach chamge    After discussing risks and benefits with pt, he was placed in flat on the bed. #6 shiley cuffed trach was deflated and removed. A #6 uncuffed shiley tracheostomy tube was inserted thru the stoma in to the trachea. Pt tolerated the procedure well without complications.

## 2021-11-05 NOTE — PROGRESS NOTE ADULT - PROBLEM SELECTOR PLAN 2
Will need to transition to uncuffed trach  Suction prn Suction prn  changed trach to #6 shiley cuffless this am by ENT- tolerated well  monitor O2 sats

## 2021-11-05 NOTE — PROGRESS NOTE ADULT - PROBLEM SELECTOR PLAN 1
HF follow up  mAINTAIN CURRENT SPEED.  nOT ANTICOAGULATED D/T gi bleed HF follow up  mAINTAIN CURRENT SPEED.  no ANTICOAGULATED D/T gi bleed

## 2021-11-05 NOTE — PROGRESS NOTE ADULT - PROBLEM SELECTOR PLAN 3
- Has been transfused multiple times throughout this admission, last transfusion was on 10/23  - Hb currently stable, on ASA 81mg Q24.

## 2021-11-05 NOTE — PROGRESS NOTE ADULT - ASSESSMENT
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 coli UTIs, driveline drainage (1/7/21) and COVID-19 (back in April 2020)  He was recently seen in clinic where he complained of abdominal pain and dark stools w constipation back in May. He presents to [facility] ER today weakness and fatigue, moderate and + Black stools for three days, on coumadin secondary to warfarin use in the setting of an LVAD. Patient has required transfusions for GIB in the past. Mostly recently back in jan 2021 pt had anemia with dark stools. No interventions was done at that time. However Last Endoscopy was done in July 2020 (negative). Today labs show patient is anemic with H/H of 4.5/16.3,. INR is 8.84 MAP in the 90s, Temp 35.1. He denies any chest pain, shortness of breath, dizziness, abd pain, nausea or vomiting.       (14 Jun 2021 16:57)      Surgery/Hospital Course:  6/14 admit for melena w/ anemia, INR 8.84   6/15 Capsul study (+) for small bowel bleed, balloon endoscopy (old blood in prox ileum); post EGD - septic w/ L opacity, re-intubated for concern for aspiration, TTE (Mod MR, decrease biV w/ interventricular septum boweing towards R)  6/17 bronch   6/20 +C Diff   6/22 CT C/A/P: Fluid filled colon which may be 2/2 rapid transit. Small bilateral pleffs with associates. Compressive atelectasis New ISABELLE & LLL  parenchymal opacities, suspicious for pneumonia. Moderate stenosis in the proximal superior mesenteric artery.   6/25 #8 Shiley trach at bedside   7/1 LVAD speed increased to 9200  7/2 Bronch  7/20 TC since 7/7. Patient transferred to SDU.   7/23 INR today 2.64.  H&H 7.3/24 this AM.  Will repeat CBC at noon, and will send stool guaiac Patient with persistent abdominal tenderness, rate of tube feeds decreased.  No nausea/vomiting.    7/24 INR today 2.4. H&H 9.1/28.6 low flow overnight /N&V, refusing Tube feeds on D5 1/2 normal  @50 cc/hr  7/25 INR 2.69  H&H 7.7/25.1 refusing Tube feeds on D5 1/2 normal  @50 cc/hr. This am + BM Melena Dr Oneill HF  aware- PRBC x1  GI team consulted -  NPO  plan on study in am-  D/w Dr Cadet Patient  to return to CTU for further management; 1PRBCS   7/27 Post op INR 2.2 today.  No bleeding. BC + for SM.  Pt is hypotensive requiring pressor and inotropic support.  ID follow up today on Cefepime will follow.  7/26 R PTC for PTX   7/28 CT C/A/P: sub q emphysema in R chest wall, GGO RUL, small ascites CTH negative; Abd US: GB thickening, pericholecystic fluid    7/30 perc choley  7/31 Perchole drain in place continues to drain total output overnight 133.  Fever today 38.8   8/1 duplex LE negative   8/7 Patient with persistent abdominal pain, refusing tube feeds and medications, Psych consulted  8/13 CTA A/P ordered to r/o mesenteric ischemia 2/2 persistent anorexia, nausea, vomiting. Revealed:  Evaluation of the mesenteric vessels is limited by streak artifact from LVAD. There appears to be severe stenosis of the proximal SMA; abdominal mesenteric doppler is recommended for further evaluation. 2.  No small bowel findings to suggest acute mesenteric ischemia. 3.  Focal dissections involving the right and left common iliac arteries.  8/15: Cultures resulted BC 1/2 +GPC in clusters, SC enterobacter; mesenteric duplex: borderline stenosis of proximal SMA  8/27: CT C/A/P noncon: Nondular opacities in R lung apex w/cavitation, abd nl  8/31:  Continue current care, treatment of thyrotoxicosis with medications as per endocrine, d/c ABX as per team.   9/8 RUQ sono: Contracted gallbladder with cholecystostomy tube in place.  9/10 failed SMA stent, c/b RP bleed s/p 3U PRCB  9/12 CTA Abd/Pelvis L RP hematoma   9/24 Trach decannulated   9/26 intubated fro resp distress for increased WOB, LL pneumonia; CT C/A/P: progressive ISABELLE opacities and L consolidations, multifocal pneumonia   9/30 TTE (EF 25%, decreased RV, mod MR)   10/3 VT -> Lidocaine gtt   10/4 Trach size 6 DCT cuff  10/5 CT abd (neg for intra-abd abcess, severe stenosis of prox SMA and b/l iliac arteries)  10/18 SMA Stent   10/23 Emend for nausea   10/24 +Occult  10/29 FEES, RUQ ascites and pericholecystic fluid   Theckened liqwuids puree diet- calorie ct    Today:  Ambulation, TC, and PO diet as tolerated.  Day 2 of calorie count  Discussions yesterday as to discharge planning, agreeable to go to [facility] however will need to be decannulated and NGT removed.  Patient candidate for SDU.   11/4 Transferred to sdu.  Must be oob - chair for all meals.  Eventual change to uncuffed trach. 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 coli UTIs, driveline drainage (1/7/21) and COVID-19 (back in April 2020)  He was recently seen in clinic where he complained of abdominal pain and dark stools w constipation back in May. He presents to [facility] ER today weakness and fatigue, moderate and + Black stools for three days, on coumadin secondary to warfarin use in the setting of an LVAD. Patient has required transfusions for GIB in the past. Mostly recently back in jan 2021 pt had anemia with dark stools. No interventions was done at that time. However Last Endoscopy was done in July 2020 (negative). Today labs show patient is anemic with H/H of 4.5/16.3,. INR is 8.84 MAP in the 90s, Temp 35.1. He denies any chest pain, shortness of breath, dizziness, abd pain, nausea or vomiting.       (14 Jun 2021 16:57)      Surgery/Hospital Course:  6/14 admit for melena w/ anemia, INR 8.84   6/15 Capsul study (+) for small bowel bleed, balloon endoscopy (old blood in prox ileum); post EGD - septic w/ L opacity, re-intubated for concern for aspiration, TTE (Mod MR, decrease biV w/ interventricular septum boweing towards R)  6/17 bronch   6/20 +C Diff   6/22 CT C/A/P: Fluid filled colon which may be 2/2 rapid transit. Small bilateral pleffs with associates. Compressive atelectasis New ISABELLE & LLL  parenchymal opacities, suspicious for pneumonia. Moderate stenosis in the proximal superior mesenteric artery.   6/25 #8 Shiley trach at bedside   7/1 LVAD speed increased to 9200  7/2 Bronch  7/20 TC since 7/7. Patient transferred to SDU.   7/23 INR today 2.64.  H&H 7.3/24 this AM.  Will repeat CBC at noon, and will send stool guaiac Patient with persistent abdominal tenderness, rate of tube feeds decreased.  No nausea/vomiting.    7/24 INR today 2.4. H&H 9.1/28.6 low flow overnight /N&V, refusing Tube feeds on D5 1/2 normal  @50 cc/hr  7/25 INR 2.69  H&H 7.7/25.1 refusing Tube feeds on D5 1/2 normal  @50 cc/hr. This am + BM Melena Dr Oneill HF  aware- PRBC x1  GI team consulted -  NPO  plan on study in am-  D/w Dr Cadet Patient  to return to CTU for further management; 1PRBCS   7/27 Post op INR 2.2 today.  No bleeding. BC + for SM.  Pt is hypotensive requiring pressor and inotropic support.  ID follow up today on Cefepime will follow.  7/26 R PTC for PTX   7/28 CT C/A/P: sub q emphysema in R chest wall, GGO RUL, small ascites CTH negative; Abd US: GB thickening, pericholecystic fluid    7/30 perc choley  7/31 Perchole drain in place continues to drain total output overnight 133.  Fever today 38.8   8/1 duplex LE negative   8/7 Patient with persistent abdominal pain, refusing tube feeds and medications, Psych consulted  8/13 CTA A/P ordered to r/o mesenteric ischemia 2/2 persistent anorexia, nausea, vomiting. Revealed:  Evaluation of the mesenteric vessels is limited by streak artifact from LVAD. There appears to be severe stenosis of the proximal SMA; abdominal mesenteric doppler is recommended for further evaluation. 2.  No small bowel findings to suggest acute mesenteric ischemia. 3.  Focal dissections involving the right and left common iliac arteries.  8/15: Cultures resulted BC 1/2 +GPC in clusters, SC enterobacter; mesenteric duplex: borderline stenosis of proximal SMA  8/27: CT C/A/P noncon: Nondular opacities in R lung apex w/cavitation, abd nl  8/31:  Continue current care, treatment of thyrotoxicosis with medications as per endocrine, d/c ABX as per team.   9/8 RUQ sono: Contracted gallbladder with cholecystostomy tube in place.  9/10 failed SMA stent, c/b RP bleed s/p 3U PRCB  9/12 CTA Abd/Pelvis L RP hematoma   9/24 Trach decannulated   9/26 intubated fro resp distress for increased WOB, LL pneumonia; CT C/A/P: progressive ISABELLE opacities and L consolidations, multifocal pneumonia   9/30 TTE (EF 25%, decreased RV, mod MR)   10/3 VT -> Lidocaine gtt   10/4 Trach size 6 DCT cuff  10/5 CT abd (neg for intra-abd abcess, severe stenosis of prox SMA and b/l iliac arteries)  10/18 SMA Stent   10/23 Emend for nausea   10/24 +Occult  10/29 FEES, RUQ ascites and pericholecystic fluid   Theckened liqwuids puree diet- calorie ct    Today:  Ambulation, TC, and PO diet as tolerated.  Day 2 of calorie count  Discussions yesterday as to discharge planning, agreeable to go to [facility] however will need to be decannulated and NGT removed.  Patient candidate for SDU.   11/4 Transferred to sdu.  Must be oob - chair for all meals.  Eventual change to uncuffed trach.  11/5 VSS; rsr/ st ; changed trach to #6 shiley cuffless this am by ENT- tolerated well; continue nutritional support with kaofeed; h/h stable 8/26 today   DNR rescinded today as per CHF/ LVAD team

## 2021-11-05 NOTE — PROGRESS NOTE ADULT - ASSESSMENT
67yo male with PMH ACC/AHA stage D HF due to NICM HM2 LVAD , TV annuloplasty ring 9/12/17 as destination therapy due to severe peripheral artery disease with significant stenosis complicated by respiratory failure S/P trach. Pt was decannulated, developed SOB, and underwent subsequent revision tracheostomy. Pt is S/P trach change today from a #6 shiley cuffed to a #6 shiley uncuffed. Pt is tolerating speaking valve.

## 2021-11-05 NOTE — PROGRESS NOTE ADULT - PROBLEM SELECTOR PLAN 2
- Stage D Heart Failure with Heartmate 2.   - Stable w/o evidence of LVAD malfunction, 9200 rpm, PI in 5's, stable.  - Started on ASA 81 mg PO QD. Remains off Coumadin, h/o recurrent GI bleeding  - Monitor Hb/Hct.   - LDH elevated but stable   - now DNR. no

## 2021-11-05 NOTE — PROGRESS NOTE ADULT - SUBJECTIVE AND OBJECTIVE BOX
SUBJECTIVE:  Pt. denies any acute complaints. No chest pain, and notices improvement in sob.     REVIEW OF SYSTEMS:  CONSTITUTIONAL: No fever, weight loss, or fatigue  CARDIOLOGY: denies chest pain, shortness of breath or syncopal episodes.   RESPIRATORY: denies shortness of breath, wheezing.   NEUROLOGICAL: NO weakness, no focal deficits to report.  ENDOCRINOLOGICAL: no recent change in diabetic medications.   GI: no BRBPR, no N,V,diarrhea.    PSYCHIATRY: normal mood and affect  HEENT: no nasal discharge, no ecchymosis  SKIN: no ecchymosis, no breakdown  MUSCULOSKELETAL: Full range of motion x4.      OBJECTIVE:    Vital Signs Last 24 Hrs  T(C): 36.5 (05 Nov 2021 07:00), Max: 36.6 (04 Nov 2021 20:35)  T(F): 97.7 (05 Nov 2021 07:00), Max: 97.9 (04 Nov 2021 20:35)  HR: 118 (05 Nov 2021 11:00) (96 - 120)  BP: --  BP(mean): 80 (05 Nov 2021 08:00) (80 - 94)  RR: 18 (05 Nov 2021 09:45) (18 - 36)  SpO2: 99% (05 Nov 2021 11:00) (99% - 100%)    PHYSICAL EXAM:  General Appearance: well appearing, normal for age and gender. 	  Neck: normal JVP, no bruit.   Eyes: No xanthelasma, Extra ocular muscles intact.   Cardiovascular: regular rate and rhythm S1 S2, No JVD, No murmurs, No edema  Respiratory: Lungs clear to auscultation	  Psychiatry: Alert and oriented x 3, Mood & affect appropriate  Gastrointestinal:  Soft, Non-tender  Skin/Integument: No rashes, No ecchymosis, No cyanosis	  Neurologic: Non-focal  Musculoskeletal/ extremities: Normal range of motion, No clubbing, cyanosis or edema  Vascular: Peripheral pulses palpable 2+ bilaterally    MEDICATIONS  (STANDING):  aspirin  chewable 81 milliGRAM(s) Oral daily  chlorhexidine 2% Cloths 1 Application(s) Topical <User Schedule>  ciprofloxacin     Tablet 500 milliGRAM(s) Oral every 12 hours  hydrALAZINE 10 milliGRAM(s) Oral every 8 hours  insulin lispro (ADMELOG) corrective regimen sliding scale   SubCutaneous every 6 hours  magnesium oxide 400 milliGRAM(s) Oral every 12 hours  methimazole 10 milliGRAM(s) Oral daily  metoclopramide Injectable 10 milliGRAM(s) IV Push every 8 hours  metoprolol tartrate 12.5 milliGRAM(s) Oral every 8 hours  mirtazapine 7.5 milliGRAM(s) Oral at bedtime  multivitamin 1 Tablet(s) Oral daily  ondansetron Injectable 8 milliGRAM(s) IV Push every 8 hours  pantoprazole  Injectable 40 milliGRAM(s) IV Push every 12 hours  predniSONE   Tablet 1 milliGRAM(s) Oral daily  sertraline 100 milliGRAM(s) Oral daily  thiamine 100 milliGRAM(s) Oral daily  urea Oral Powder 15 Gram(s) Oral daily  vancomycin    Solution 125 milliGRAM(s) Oral every 12 hours    MEDICATIONS  (PRN):  guaiFENesin Oral Liquid (Sugar-Free) 200 milliGRAM(s) Oral every 4 hours PRN mucous plugging  simethicone 80 milliGRAM(s) Chew every 8 hours PRN Gas    	  TELEMETRY:    ECG:    TTE:    LABS:                        8.5    7.94  )-----------( 293      ( 05 Nov 2021 06:59 )             26.7     11-05    137  |  99  |  16  ----------------------------<  116<H>  4.0   |  27  |  0.51    Ca    9.3      05 Nov 2021 06:59  Phos  2.6     11-04  Mg     1.8     11-04    TPro  7.7  /  Alb  3.8  /  TBili  0.4  /  DBili  x   /  AST  26  /  ALT  42  /  AlkPhos  174<H>  11-05          I&O's Summary    04 Nov 2021 07:01  -  05 Nov 2021 07:00  --------------------------------------------------------  IN: 1250 mL / OUT: 470 mL / NET: 780 mL    05 Nov 2021 07:01  -  05 Nov 2021 12:22  --------------------------------------------------------  IN: 600 mL / OUT: 250 mL / NET: 350 mL

## 2021-11-06 NOTE — SPEAKING VALVE EVALUATION - OBSERVATIONS
Pt was observed by this clinician directly for ~45 minutes, was also observed by DILIP Hernandez, with SLP intermittently checking in.
Pt seen at bedside, awake and alert, oriented, verbally responsive, following directions for the purposes of the exam. Pt seen with assistance of  #[id number] (Debi)
Pt seen at bedside, awake and alert, oriented, verbally responsive, following directions for the purposes of the exam. Pt seen with assistance of  #[id number] (Debi)

## 2021-11-06 NOTE — PROGRESS NOTE ADULT - PROBLEM SELECTOR PLAN 2
Suction prn  changed trach to #6 shiley cuffless this am by ENT- tolerated well  monitor O2 sats Suction prn- approx q3 hours   changed trach to #6 shiley cuffless this am by ENT- tolerated well  monitor O2 sats

## 2021-11-06 NOTE — PROGRESS NOTE ADULT - PA/NP ONLY VISIT
ACP only visit

## 2021-11-06 NOTE — PROGRESS NOTE ADULT - PROBLEM SELECTOR PLAN 1
HF follow up  mAINTAIN CURRENT SPEED.  no ANTICOAGULATED D/T gi bleed HF follow up  mAINTAIN CURRENT SPEED.  asa 81 qd   increase hydral 20 q8 as per CHF/LVAD team HF follow up  mAINTAIN CURRENT SPEED.  asa 81 qd   continue lopressor 12.5 mg po bid   mag oxide   increase hydral 20 q8 as per CHF/LVAD team  discharge planning- rehab when stable

## 2021-11-06 NOTE — CHART NOTE - NSCHARTNOTEFT_GEN_A_CORE
****IN PROGRESS/INCOMPLETE****     Nutrition Services Follow Up:  Pt seen for nutrition follow up/calorie count results.     Chart reviewed, events noted. 66M, PMH ACC/AHA stage D HF 2/2 NICM s/p HM2 LVAD (2017). Here initially for GIB prolonged hospital course, s/p tracheostomy, acalculous cholecystitis s/p perc dawn. Patient c persistent intermittent abdominal pain - found with SMA stenosis on mesenteric angiogram on 9/10. S/p trach placement 10/4. S/p mesenteric angiogram with x2 stents (10/18). now made DNR. Most recently has been receiving supplemental PO feeds after seen by SLP; currently tolerating with EN infusions.    Source: pt, EMR, interdisciplinary team     Diet, Pureed:   Mildly Thick Liquids (MILDTHICKLIQS)  Tube Feeding Modality: Nasogastric  Vital 1.5 Tank (VITAL1.5RTH)  Total Volume for 24 Hours (mL): 1200  Continuous  Starting Tube Feed Rate {mL per Hour}: 50  Until Goal Tube Feed Rate (mL per Hour): 50  Tube Feed Duration (in Hours): 24  Tube Feed Start Time: 15:40  Supplement Feeding Modality:  Oral  Ensure Enlive Cans or Servings Per Day:  3       Frequency:  Daily (21 @ 09:29)    EN order provides: 1200ml formula, 1800kcals, 81g protein, 917ml free H2O    EN provisions per chart: 1150ml (), 1250ml (), 1200ml (11/3)   -- meeting 100% recommended EN provision x 3 day average    Nutrition-Related Events:   - FEES completed 10/29 with SLP recommendations for "Pureed texture diet with Mildly Thick Liquids, SMALL SINGLE SIPS, NO STRAWS"  - Remeron ordered  - Insulin Sliding Scale ordered to optimize BG  - Continues with Multivitamin & thiamine supplementation ordered daily    GI:  Last BM .   Bowel Regimen? [] Yes   [x] No    Daily Weight in k (), Weight in k.7 (), Weight in k.5 (), Weight in k (10-31)    Weight history:   - Admission weight: 176.3 pounds/80 kg (6/15)    MEDICATIONS  (STANDING):  aspirin  chewable 81 milliGRAM(s) Oral daily  chlorhexidine 2% Cloths 1 Application(s) Topical <User Schedule>  ciprofloxacin     Tablet 500 milliGRAM(s) Oral every 12 hours  hydrALAZINE 10 milliGRAM(s) Oral every 8 hours  insulin lispro (ADMELOG) corrective regimen sliding scale   SubCutaneous every 6 hours  magnesium oxide 400 milliGRAM(s) Oral every 12 hours  methimazole 10 milliGRAM(s) Oral daily  metoclopramide Injectable 10 milliGRAM(s) IV Push every 8 hours  metoprolol tartrate 12.5 milliGRAM(s) Oral every 8 hours  mirtazapine 7.5 milliGRAM(s) Oral at bedtime  multivitamin 1 Tablet(s) Oral daily  ondansetron Injectable 8 milliGRAM(s) IV Push every 8 hours  pantoprazole  Injectable 40 milliGRAM(s) IV Push every 12 hours  sertraline 100 milliGRAM(s) Oral daily  thiamine 100 milliGRAM(s) Oral daily  urea Oral Powder 15 Gram(s) Oral daily  vancomycin    Solution 125 milliGRAM(s) Oral every 12 hours    MEDICATIONS  (PRN):  guaiFENesin Oral Liquid (Sugar-Free) 200 milliGRAM(s) Oral every 4 hours PRN mucous plugging  simethicone 80 milliGRAM(s) Chew every 8 hours PRN Gas    Pertinent Labs:  @ 00:29: Na 130<L>, BUN 24<H>, Cr 0.45<L>, <H>, K+ 4.3, Phos 2.9, Mg 1.8, Alk Phos 212<H>, ALT/SGPT 102<H>, AST/SGOT 53<H>, HbA1c --    A1C with Estimated Average Glucose Result: 5.4 % (08-15-21 @ 13:52)  A1C with Estimated Average Glucose Result: 5.6 % (06-15-21 @ 02:42)    Finger Sticks:  POCT Blood Glucose.: 124 mg/dL ( @ 05:32)  POCT Blood Glucose.: 122 mg/dL ( @ 00:03)  POCT Blood Glucose.: 126 mg/dL (10-31 @ 17:06)  POCT Blood Glucose.: 122 mg/dL (10-31 @ 12:41)    Skin per nursing flowsheets: tracheal wound; no pressure injuries noted  Edema: none noted    Estimated Nutrition Needs: based on recent weight 53.1kg (10/21)  Energy Needs (33-38 kcals/kg): 1752-2017kcal  Protein Needs (1.6-2.0 g/kg): 85-106g    Previous Nutrition Diagnosis: Severe chronic malnutrition  Nutrition diagnosis is ongoing & addressed with tube feeds as tolerated    No new nutrition diagnosis at this time    Recommendations:      ****IN PROGRESS/INCOMPLETE****       Monitoring and Evaluation:   Continue to monitor nutritional intake, tolerance to diet prescription, weights, labs, skin integrity  RD remains available upon request and will follow up per protocol     Elmira Melton, MS, RD, CDN #[phone] Nutrition Services Follow Up:  Pt seen for nutrition follow up/calorie count results.     Chart reviewed, events noted. 66M, PMH ACC/AHA stage D HF 2/2 NICM s/p HM2 LVAD (2017). Here initially for GIB prolonged hospital course, s/p tracheostomy, acalculous cholecystitis s/p perc dawn. Patient c persistent intermittent abdominal pain - found with SMA stenosis on mesenteric angiogram on 9/10. S/p trach placement 10/4. S/p mesenteric angiogram with x2 stents (10/18). now made DNR. Most recently has been receiving supplemental PO feeds after seen by SLP; currently tolerating with EN infusions.    Source: EMR, interdisciplinary team     Diet, Pureed:   Mildly Thick Liquids (MILDTHICKLIQS)  Tube Feeding Modality: Nasogastric  Vital 1.5 Tank (VITAL1.5RTH)  Total Volume for 24 Hours (mL): 1200  Continuous  Starting Tube Feed Rate {mL per Hour}: 50  Until Goal Tube Feed Rate (mL per Hour): 50  Tube Feed Duration (in Hours): 24  Tube Feed Start Time: 15:40  Supplement Feeding Modality:  Oral  Ensure Enlive Cans or Servings Per Day:  3       Frequency:  Daily (21 @ 09:29)    EN order provides: 1200ml formula, 1800kcals, 81g protein, 917ml free H2O   -- TF infusing at 50ml/hr at visit    EN provisions per chart: 1150ml (), 1250ml (), 1200ml (11/3)   -- meeting 100% recommended EN provision x 3 day average    Calorie Count Results per RN documentation:   11/3: 1053kcal, 49g protein  : 730kcal, 14g protein  : 1207kcal, 52g protein  3 day average 997kcal, 38g protein - meets 57% lower end estimated energy needs, 45% lower end estimated protein needs.    Nutrition-Related Events:   - FEES completed 10/29 with SLP recommendations for "Pureed texture diet with Mildly Thick Liquids, SMALL SINGLE SIPS, NO STRAWS"  - Remeron ordered  - Insulin Sliding Scale ordered to optimize BG  - Continues with Multivitamin & thiamine supplementation ordered daily    GI:  Last BM .   Bowel Regimen? [] Yes   [x] No    Daily Weight in k (-), Weight in k.7 (-), Weight in k.5 (-), Weight in k (10-31)    Weight history:   - Admission weight: 176.3 pounds/80 kg (6/15)    MEDICATIONS  (STANDING):  aspirin  chewable 81 milliGRAM(s) Oral daily  chlorhexidine 2% Cloths 1 Application(s) Topical <User Schedule>  ciprofloxacin     Tablet 500 milliGRAM(s) Oral every 12 hours  hydrALAZINE 10 milliGRAM(s) Oral every 8 hours  insulin lispro (ADMELOG) corrective regimen sliding scale   SubCutaneous every 6 hours  magnesium oxide 400 milliGRAM(s) Oral every 12 hours  methimazole 10 milliGRAM(s) Oral daily  metoclopramide Injectable 10 milliGRAM(s) IV Push every 8 hours  metoprolol tartrate 12.5 milliGRAM(s) Oral every 8 hours  mirtazapine 7.5 milliGRAM(s) Oral at bedtime  multivitamin 1 Tablet(s) Oral daily  ondansetron Injectable 8 milliGRAM(s) IV Push every 8 hours  pantoprazole  Injectable 40 milliGRAM(s) IV Push every 12 hours  sertraline 100 milliGRAM(s) Oral daily  thiamine 100 milliGRAM(s) Oral daily  urea Oral Powder 15 Gram(s) Oral daily  vancomycin    Solution 125 milliGRAM(s) Oral every 12 hours    MEDICATIONS  (PRN):  guaiFENesin Oral Liquid (Sugar-Free) 200 milliGRAM(s) Oral every 4 hours PRN mucous plugging  simethicone 80 milliGRAM(s) Chew every 8 hours PRN Gas    Pertinent Labs:  @ 00:29: Na 130<L>, BUN 24<H>, Cr 0.45<L>, <H>, K+ 4.3, Phos 2.9, Mg 1.8, Alk Phos 212<H>, ALT/SGPT 102<H>, AST/SGOT 53<H>, HbA1c --    A1C with Estimated Average Glucose Result: 5.4 % (08-15-21 @ 13:52)  A1C with Estimated Average Glucose Result: 5.6 % (06-15-21 @ 02:42)    Finger Sticks:  POCT Blood Glucose.: 124 mg/dL ( @ 05:32)  POCT Blood Glucose.: 122 mg/dL ( @ 00:03)  POCT Blood Glucose.: 126 mg/dL (10-31 @ 17:06)  POCT Blood Glucose.: 122 mg/dL (10-31 @ 12:41)    Skin per nursing flowsheets: tracheal wound; no pressure injuries noted  Edema: none noted    Estimated Nutrition Needs: based on recent weight 53.1kg (10/21)  Energy Needs (33-38 kcals/kg): 1752-2017kcal  Protein Needs (1.6-2.0 g/kg): 85-106g    Previous Nutrition Diagnosis: Severe chronic malnutrition  Nutrition diagnosis is ongoing & addressed with tube feeds as tolerated    No new nutrition diagnosis at this time    Recommendations:    1. Continue to monitor PO intake, consider additional calorie count to further assess adequacy of PO diet. Continue diet free of therapeutic restrictions, pureed with mildly thick liquids per team, with Ensure Enlive 3x daily (1050 tank and 60 gm protein) to promote PO intake.  2. Continue feeds of Vital 1.5 at 50ml/hr x 24hr to provide 1200ml formula, 1800kcals, 81g protein, 917ml free H2O (meets 34kcals/kg & 1.5g protein/kg based on 10/21 wt 53.1kg). Defer additional free water flushes to team.  3. Continue micronutrient supplementation as ordered.   4. RD to remain available to adjust EN formulary, volume/rate PRN.     Monitoring and Evaluation:   Continue to monitor nutritional intake, tolerance to diet prescription, weights, labs, skin integrity  RD remains available upon request and will follow up per protocol     Elmira Melton, MS, RD, CDN #[phone]

## 2021-11-06 NOTE — SPEAKING VALVE EVALUATION - H & P REVIEW
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 coli UTIs, driveline drainage (1/7/21) and COVID-19 (April 2020). Seen in clinic for c/o abdominal pain and dark stools w constipation in May. He presents to [facility] ER 6/14 w/ weakness, fatigue, and + Black stools x3 days, warfarin use in setting of  LVAD. Pt has required transfusions for GIB in past. Most recently in jan 2021 pt had anemia with dark stools. No interventions at that time. Last Endoscopy done in July 2020 (negative). 6/14: admitted for melena w/ anemia, INR 8.84/yes

## 2021-11-06 NOTE — PROGRESS NOTE ADULT - SUBJECTIVE AND OBJECTIVE BOX
RICKY JOINT  MRN#: [id number]  Subjective:  Pulmonary progress  : recurrent Acute hypoxic respiratory Failure ,aspiration pneumonia, NICM  ,   65M PMH ACC/AHA stage D HF due to NICM HM2 LVAD , TV annuloplasty ring 17 as destination therapy due to severe peripheral artery disease with significant stenosis  SIADH, Depression, CKD-3 with hyperkalemia, past E. coli UTIs, driveline drainage (21) and COVID-19 (back in 2020)  He was recently seen in clinic where he complained of abdominal pain and dark stools w constipation back in May. He presents to [facility] ER today weakness and fatigue, moderate and + Black stools for three days, on coumadin secondary to warfarin use in the setting of an LVAD. Patient has required transfusions for GIB in the past. Mostly recently back in 2021 pt had anemia with dark stools. No interventions was done at that time. However Last Endoscopy was done in 2020 (negative). Today labs show patient is anemic with H/H of 4.5/16.3,. INR is 8.84 MAP in the 90s, Temp 35.1. He denies any chest pain, shortness of breath, dizziness, abd pain, nausea or vomiting. found to have  rectal bleeding underwent endoscopy ,old blood in the proximal ileum ,  develop sepsis with LL opacity given Antibiotics , Extubated , reintubated , Bronchoscopy on Zosyn for LL pneumonia  and Amiodarone S/P TV Annuloplasty , patient remain intubated on full ventilatory support .S/P multiple units of blood transfusion , remain on full ventilatory support on Precedex and propofol , new central IJ line , diarrhea C diff. +ve on po vancomycin and IV Flagyl,  mildly distended belly , fever start on cefepime 2gm q 8 hrs S/P tracheostomy .  new RT Subclavian central line continue on contact  isolation ,S/P  C-diff antibiotics, no more diarrhea back on full support mechanical ventilator , chest x ray show improvement in LLL air space disease, more awake and responsive on tube feeding no more diarrhea ,  no nausea or vomiting or diarrhea still very weak and tired , back on tube feeding ,still on po vancomycin , getting PT and OT at bed side ,   , no SOB getting stronger , improve muscle tone patient transfer to monitor bed still on contact isolation for C-Difficel colitis on 50% FI02, and change to Suresh  tube feeding still loose stool . H/H drop significantly require blood transfusion , most likely GI bleeding , IV heparin D/C ,  H/H is stable ., patient develop TR sided  pneumothorax require chest tube placement , RT IJ central line  placed , develop fever shaking chills , blood culture positive for serratia marcescens , start on cefepime .the patient  become hypoxic and hypotensive placed on full ventilatory support and Vasopressin , levophed and Dobutamine ,S/P blood transfusion on meropenem and vancomycin ,   , on and  off pressors , occasional agitation on Precedex .S/P IR cholecystostomy tube drainage placement in the RT upper Quadrant , resume anticoagulation chest x ray noted C-PAP trail lasted only for 2 hrs , new RT SC line and D/C RT IJ line , RT pig tail cathter has been removed , tolerating C-PAP trial placed on trach. collar 50% FI02 GI consultant noted on NG tube feeding as tolerated , develop AF RVR S/P  bolus Amiodarone  back to regular sinus Rhythm , Flat Affect depressed , back on tube feeding Vital AF at 60 cc/ hr .still intermittent abdominal pain , no fever saturation is accepted  back on full ventilatory support ,   on methimazole for hyperthyroidism ,  condition is the same ,still C/O Abdominal pain , white count is improving , no chest pain or SOB ,  .placed on Reglan 10 mg TID for gastric motility , depressed , withdrawn. , S/P  mesenteric angiogram , unable to stent SMA S/P 3 units of blood transfusion   RT IJ in place reassessed for stent in the SMA by vascular,  dark stool stable H/H ,surgical opinion for possible Lap cholecystectomy. over night events noted develop respiratory distress, , rectal bleeding, require intubation placed on full ventilatory support , FI02 is 50% also became hemianosmically unstable require triple pressor support with levophed , vasopressin and norsynephrine, pan culture placed  on Vancomycin IV and PO  and Zosyn, sedated with propofol kept NPO , new central line RT and left IJ cathter placed . Diflucan Added .fever of 38.5 weaned off  vasopressin ,   fever adding IV vancomycin and  vancomycin solution  , and Bactrim , S/P  tracheostomy , bleeding receiving blood transfusion on vasopressin , no more bleeding , no fever , more awake and responsive ,tolerating tube feeding , ID and heart failure follow up appreciated , depresses no weaning for now , magnesium is low to give supplement too keep Above 2 , patient S/P  vascular procedure for superior mesenteric artery occlusion S/P 2 stent placement , patient tolerate it very well  , left upper extremities hematoma .vascular follow up appreciated , increase WBC , new RT UPE midline , black tarry stool , very loose R/O recurrent GI bleeding and C-dificile  colitis stable H/H no events over night , diarrhea is improved ,  WBC is improving  , hyponatremia no change , S/P FEEST study result is pending , PO feeding with observation . on Cipro antibiotics  no major over night events , worsening LFTs for repeat Abdominal sonogram pending , still diarrhea .but less , trial of po feeding , on monitor bed .start decannulation to cuffless trach . on 40% FI02, po feeding no events of aspiration .     (2021 16:57)    PAST MEDICAL & SURGICAL HISTORY:  CHF (congestive heart failure)    CAD (coronary artery disease)  Depression    Pleural effusion    History of 2019 novel coronavirus disease (COVID-19)  2020    Hemorrhoids    Bleeding hemorrhoids    Peripheral arterial disease    Claudication    BPH with urinary obstruction    ACC/AHA stage D systolic heart failure  Anticoagulation goal of INR 2.0 to 2.5    Falls    Clavicle fracture    CKD (chronic kidney disease), stage III    Iron deficiency anemia    H/O epistaxis    Vertigo    GI bleed    S/P TVR (tricuspid valve repair)    S/P ventricular assist device    S/P endoscopy    OBJECTIVE:  ICU Vital Signs Last 24 Hrs  T(C): 38.2 (2021 10:00), Max: 38.5 (2021 12:00)  T(F): 100.8 (2021 10:00), Max: 101.3 (2021 12:00)  HR: 65 (2021 10:00) (61 - 69)  BP: --  BP(mean): --  ABP: 105/67 (2021 10:00) (90/54 - 113/64)  ABP(mean): 77 (2021 10:00) (63 - 77)  RR: 20 (2021 10:00) (19 - 35)  SpO2: 99% (2021 10:00) (96% - 100%)      - @ 07:01  -  06- @ 07:00  --------------------------------------------------------  IN: 2693.9 mL / OUT: 1415 mL / NET: 1278.9 mL     @ 07: @ 10:49  --------------------------------------------------------  IN: 420.8 mL / OUT: 115 mL / NET: 305.8 mL  PHYSICAL EXAM: Daily   Elderly male  on trach. collar  FI02 is 40% S/P tracheostomy   Daily Weight in k.4 (2021 00:00)  HEENT:     + NCAT  + EOMI  - Conjuctival edema   - Icterus   - Thrush   - ETT  + NGT/OGT  Neck:         + FROM  + JVD  - Nodes - Masses + Mid-line trachea + Tracheostomy  Chest:            normal A-P diameter    Lungs:          + CTA   + Rhonchi    - Rales    - Wheezing + Decreased  LT BS   - Dullness R L  Cardiac:       + S1 + S2    + RRR   - Irregular   - S3  - S4    - Murmurs   - Rub   - Hamman’s sign   Abdomen:    + BS  + Soft + Non-tender - Distended - Organomegaly - PEG .cholecystostomy tube in place  Extremities:   - Cyanosis U/L   - Clubbing  U/L  + LE/UE Edema LUE hematoma   + Capillary refill    + Pulses   Neuro:        - Awake   -  Alert   - Confused   - Lethargic   + Sedated  + Generalized Weakness  Skin:        - Rashes    - Erythema   + Normal incisions   + IV sites intact          HOSPITAL MEDICATIONS: All medications reviewed and analyzed  MEDICATIONS  (STANDING):  amiodarone    Tablet 200 milliGRAM(s) Oral daily  chlorhexidine 0.12% Liquid 15 milliLiter(s) Oral Mucosa every 12 hours  chlorhexidine 2% Cloths 1 Application(s) Topical <User Schedule>  dexmedetomidine Infusion 0.5 MICROgram(s)/kG/Hr (9.81 mL/Hr) IV Continuous <Continuous>  dextrose 50% Injectable 50 milliLiter(s) IV Push every 15 minutes  heparin  Infusion 400 Unit(s)/Hr (12.5 mL/Hr) IV Continuous <Continuous>  Hydromorphone  Injectable 0.5 milliGRAM(s) IV Push once  insulin lispro (ADMELOG) corrective regimen sliding scale   SubCutaneous every 6 hours  pantoprazole  Injectable 40 milliGRAM(s) IV Push every 12 hours  piperacillin/tazobactam IVPB.. 3.375 Gram(s) IV Intermittent every 8 hours  propofol Infusion 20 MICROgram(s)/kG/Min (9.42 mL/Hr) IV Continuous <Continuous>  sodium chloride 0.9% lock flush 3 milliLiter(s) IV Push every 8 hours  sodium chloride 0.9%. 1000 milliLiter(s) (10 mL/Hr) IV Continuous <Continuous>    MEDICATIONS  (PRN):  acetaminophen    Suspension .. 650 milliGRAM(s) Oral every 6 hours PRN Temp greater or equal to 38C (100.4F)    LABS: All Lab data reviewed and analyzed                        8.8    9.13  )-----------( 290      ( 2021 07:03 )             28.0         135  |  99  |  13  ----------------------------<  116<H>  4.1   |  24  |  0.49<L>    Ca    9.2      2021 07:01  Phos  3.3     11-06  Mg     1.5     -    TPro  7.4  /  Alb  3.7  /  TBili  0.4  /  DBili  x   /  AST  31  /  ALT  42  /  AlkPhos  178<H>        Ca    9.3      2021 06:59  Phos  2.6     11-  Mg     1.8     -    TPro  7.7  /  Alb  3.8  /  TBili  0.4  /  DBili  x   /  AST  26  /  ALT  42  /  AlkPhos  174<H>                                                                                                                                                                                             PTT - ( 2021 04:52 )  PTT:45.2 sec LIVER FUNCTIONS - ( 2021 00:42 )  Alb: 3.4 g/dL / Pro: 6.7 g/dL / ALK PHOS: 213 U/L / ALT: 15 U/L / AST: 24 U/L / GGT: x           RADIOLOGY: - Reviewed and analyzed  , LVAD HM2, CT scan of abdomen reviewed result noted

## 2021-11-06 NOTE — PROGRESS NOTE ADULT - ASSESSMENT
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 coli UTIs, driveline drainage (1/7/21) and COVID-19 (back in April 2020)  He was recently seen in clinic where he complained of abdominal pain and dark stools w constipation back in May. He presents to [facility] ER today weakness and fatigue, moderate and + Black stools for three days, on coumadin secondary to warfarin use in the setting of an LVAD. Patient has required transfusions for GIB in the past. Mostly recently back in jan 2021 pt had anemia with dark stools. No interventions was done at that time. However Last Endoscopy was done in July 2020 (negative). Today labs show patient is anemic with H/H of 4.5/16.3,. INR is 8.84 MAP in the 90s, Temp 35.1. He denies any chest pain, shortness of breath, dizziness, abd pain, nausea or vomiting.       (14 Jun 2021 16:57)      Surgery/Hospital Course:  6/14 admit for melena w/ anemia, INR 8.84   6/15 Capsul study (+) for small bowel bleed, balloon endoscopy (old blood in prox ileum); post EGD - septic w/ L opacity, re-intubated for concern for aspiration, TTE (Mod MR, decrease biV w/ interventricular septum boweing towards R)  6/17 bronch   6/20 +C Diff   6/22 CT C/A/P: Fluid filled colon which may be 2/2 rapid transit. Small bilateral pleffs with associates. Compressive atelectasis New ISABELLE & LLL  parenchymal opacities, suspicious for pneumonia. Moderate stenosis in the proximal superior mesenteric artery.   6/25 #8 Shiley trach at bedside   7/1 LVAD speed increased to 9200  7/2 Bronch  7/20 TC since 7/7. Patient transferred to SDU.   7/23 INR today 2.64.  H&H 7.3/24 this AM.  Will repeat CBC at noon, and will send stool guaiac Patient with persistent abdominal tenderness, rate of tube feeds decreased.  No nausea/vomiting.    7/24 INR today 2.4. H&H 9.1/28.6 low flow overnight /N&V, refusing Tube feeds on D5 1/2 normal  @50 cc/hr  7/25 INR 2.69  H&H 7.7/25.1 refusing Tube feeds on D5 1/2 normal  @50 cc/hr. This am + BM Melena Dr Oneill HF  aware- PRBC x1  GI team consulted -  NPO  plan on study in am-  D/w Dr Cadet Patient  to return to CTU for further management; 1PRBCS   7/27 Post op INR 2.2 today.  No bleeding. BC + for SM.  Pt is hypotensive requiring pressor and inotropic support.  ID follow up today on Cefepime will follow.  7/26 R PTC for PTX   7/28 CT C/A/P: sub q emphysema in R chest wall, GGO RUL, small ascites CTH negative; Abd US: GB thickening, pericholecystic fluid    7/30 perc choley  7/31 Perchole drain in place continues to drain total output overnight 133.  Fever today 38.8   8/1 duplex LE negative   8/7 Patient with persistent abdominal pain, refusing tube feeds and medications, Psych consulted  8/13 CTA A/P ordered to r/o mesenteric ischemia 2/2 persistent anorexia, nausea, vomiting. Revealed:  Evaluation of the mesenteric vessels is limited by streak artifact from LVAD. There appears to be severe stenosis of the proximal SMA; abdominal mesenteric doppler is recommended for further evaluation. 2.  No small bowel findings to suggest acute mesenteric ischemia. 3.  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  8/15: Cultures resulted BC 1/2 +GPC in clusters, SC enterobacter; mesenteric duplex: borderline stenosis of proximal SMA  8/27: CT C/A/P noncon: Nondular opacities in R lung apex w/cavitation, abd nl  8/31:  Continue current care, treatment of thyrotoxicosis with medications as per endocrine, d/c ABX as per team.   9/8 RUQ sono: Contracted gallbladder with cholecystostomy tube in place.  9/10 failed SMA stent, c/b RP bleed s/p 3U PRCB  9/12 CTA Abd/Pelvis L RP hematoma   9/24 Trach decannulated   9/26 intubated fro resp distress for increased WOB, LL pneumonia; CT C/A/P: progressive ISABELLE opacities and L consolidations, multifocal pneumonia   9/30 TTE (EF 25%, decreased RV, mod MR)   10/3 VT -> Lidocaine gtt   10/4 Trach size 6 DCT cuff  10/5 CT abd (neg for intra-abd abcess, severe stenosis of prox SMA and b/l iliac arteries)  10/18 SMA Stent   10/23 Emend for nausea   10/24 +Occult  10/29 FEES, RUQ ascites and pericholecystic fluid   Theckened liqwuids puree diet- calorie ct    Today:  Ambulation, TC, and PO diet as tolerated.  Day 2 of calorie count  Discussions yesterday as to discharge planning, agreeable to go to [facility] however will need to be decannulated and NGT removed.  Patient candidate for SDU.   11/4 Transferred to sdu.  Must be oob - chair for all meals.  Eventual change to uncuffed trach.  11/5 VSS; rsr/ st ; changed trach to #6 shiley cuffless this am by ENT- tolerated well; continue nutritional support with kaofeed; h/h stable 8/26 today   DNR rescinded today as per CHF/ LVAD team            65M PMH ACC/AHA stage D HF due to NICM HM2 LVAD , TV annuloplasty ring 9/12/17 as destination therapy due to severe peripheral artery disease with significant stenosis  SIADH, Depression, CKD-3 with hyperkalemia, past E. coli UTIs, driveline drainage (1/7/21) and COVID-19 (back in April 2020)  He was recently seen in clinic where he complained of abdominal pain and dark stools w constipation back in May. He presents to [facility] ER today weakness and fatigue, moderate and + Black stools for three days, on coumadin secondary to warfarin use in the setting of an LVAD. Patient has required transfusions for GIB in the past. Mostly recently back in jan 2021 pt had anemia with dark stools. No interventions was done at that time. However Last Endoscopy was done in July 2020 (negative). Today labs show patient is anemic with H/H of 4.5/16.3,. INR is 8.84 MAP in the 90s, Temp 35.1. He denies any chest pain, shortness of breath, dizziness, abd pain, nausea or vomiting.       (14 Jun 2021 16:57)  Surgery/Hospital Course:  6/14 admit for melena w/ anemia, INR 8.84   6/15 Capsul study (+) for small bowel bleed, balloon endoscopy (old blood in prox ileum); post EGD - septic w/ L opacity, re-intubated for concern for aspiration, TTE (Mod MR, decrease biV w/ interventricular septum boweing towards R)  6/17 bronch   6/20 +C Diff   6/22 CT C/A/P: Fluid filled colon which may be 2/2 rapid transit. Small bilateral pleffs with associates. Compressive atelectasis New ISABELLE & LLL  parenchymal opacities, suspicious for pneumonia. Moderate stenosis in the proximal superior mesenteric artery.   6/25 #8 Shiley trach at bedside   7/1 LVAD speed increased to 9200  7/2 Bronch  7/20 TC since 7/7. Patient transferred to SDU.   7/23 INR today 2.64.  H&H 7.3/24 this AM.  Will repeat CBC at noon, and will send stool guaiac Patient with persistent abdominal tenderness, rate of tube feeds decreased.  No nausea/vomiting.    7/24 INR today 2.4. H&H 9.1/28.6 low flow overnight /N&V, refusing Tube feeds on D5 1/2 normal  @50 cc/hr  7/25 INR 2.69  H&H 7.7/25.1 refusing Tube feeds on D5 1/2 normal  @50 cc/hr. This am + BM Melena Dr Oneill HF  aware- PRBC x1  GI team consulted -  NPO  plan on study in am-  D/w Dr Cadet Patient  to return to CTU for further management; 1PRBCS   7/27 Post op INR 2.2 today.  No bleeding. BC + for SM.  Pt is hypotensive requiring pressor and inotropic support.  ID follow up today on Cefepime will follow.  7/26 R PTC for PTX   7/28 CT C/A/P: sub q emphysema in R chest wall, GGO RUL, small ascites CTH negative; Abd US: GB thickening, pericholecystic fluid    7/30 perc choley  7/31 Perchole drain in place continues to drain total output overnight 133.  Fever today 38.8   8/1 duplex LE negative   8/7 Patient with persistent abdominal pain, refusing tube feeds and medications, Psych consulted  8/13 CTA A/P ordered to r/o mesenteric ischemia 2/2 persistent anorexia, nausea, vomiting. Revealed:  Evaluation of the mesenteric vessels is limited by streak artifact from LVAD. There appears to be severe stenosis of the proximal SMA; abdominal mesenteric doppler is recommended for further evaluation. 2.  No small bowel findings to suggest acute mesenteric ischemia. 3.  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  8/15: Cultures resulted BC 1/2 +GPC in clusters, SC enterobacter; mesenteric duplex: borderline stenosis of proximal SMA  8/27: CT C/A/P noncon: Nondular opacities in R lung apex w/cavitation, abd nl  8/31:  Continue current care, treatment of thyrotoxicosis with medications as per endocrine, d/c ABX as per team.   9/8 RUQ sono: Contracted gallbladder with cholecystostomy tube in place.  9/10 failed SMA stent, c/b RP bleed s/p 3U PRCB  9/12 CTA Abd/Pelvis L RP hematoma   9/24 Trach decannulated   9/26 intubated fro resp distress for increased WOB, LL pneumonia; CT C/A/P: progressive ISABELLE opacities and L consolidations, multifocal pneumonia   9/30 TTE (EF 25%, decreased RV, mod MR)   10/3 VT -> Lidocaine gtt   10/4 Trach size 6 DCT cuff  10/5 CT abd (neg for intra-abd abcess, severe stenosis of prox SMA and b/l iliac arteries)  10/18 SMA Stent   10/23 Emend for nausea   10/24 +Occult  10/29 FEES, RUQ ascites and pericholecystic fluid   Theckened liqwuids puree diet- calorie ct    Today:  Ambulation, TC, and PO diet as tolerated.  Day 2 of calorie count  Discussions yesterday as to discharge planning, agreeable to go to [facility] however will need to be decannulated and NGT removed.  Patient candidate for SDU.   11/4 Transferred to sdu.  Must be oob - chair for all meals.  Eventual change to uncuffed trach.  11/5 VSS; rsr/ st ; changed trach to #6 shiley cuffless this am by ENT- tolerated well; continue nutritional support with kaofeed; h/h stable 8/26 today   DNR rescinded today as per CHF/ LVAD team   11/6 VSS: pt tolerating supervised po diet; shantelle count in progress          65M PMH ACC/AHA stage D HF due to NICM HM2 LVAD , TV annuloplasty ring 9/12/17 as destination therapy due to severe peripheral artery disease with significant stenosis  SIADH, Depression, CKD-3 with hyperkalemia, past E. coli UTIs, driveline drainage (1/7/21) and COVID-19 (back in April 2020)  He was recently seen in clinic where he complained of abdominal pain and dark stools w constipation back in May. He presents to [facility] ER today weakness and fatigue, moderate and + Black stools for three days, on coumadin secondary to warfarin use in the setting of an LVAD. Patient has required transfusions for GIB in the past. Mostly recently back in jan 2021 pt had anemia with dark stools. No interventions was done at that time. However Last Endoscopy was done in July 2020 (negative). Today labs show patient is anemic with H/H of 4.5/16.3,. INR is 8.84 MAP in the 90s, Temp 35.1. He denies any chest pain, shortness of breath, dizziness, abd pain, nausea or vomiting.       (14 Jun 2021 16:57)  Surgery/Hospital Course:  6/14 admit for melena w/ anemia, INR 8.84   6/15 Capsul study (+) for small bowel bleed, balloon endoscopy (old blood in prox ileum); post EGD - septic w/ L opacity, re-intubated for concern for aspiration, TTE (Mod MR, decrease biV w/ interventricular septum boweing towards R)  6/17 bronch   6/20 +C Diff   6/22 CT C/A/P: Fluid filled colon which may be 2/2 rapid transit. Small bilateral pleffs with associates. Compressive atelectasis New ISABELLE & LLL  parenchymal opacities, suspicious for pneumonia. Moderate stenosis in the proximal superior mesenteric artery.   6/25 #8 Shiley trach at bedside   7/1 LVAD speed increased to 9200  7/2 Bronch  7/20 TC since 7/7. Patient transferred to SDU.   7/23 INR today 2.64.  H&H 7.3/24 this AM.  Will repeat CBC at noon, and will send stool guaiac Patient with persistent abdominal tenderness, rate of tube feeds decreased.  No nausea/vomiting.    7/24 INR today 2.4. H&H 9.1/28.6 low flow overnight /N&V, refusing Tube feeds on D5 1/2 normal  @50 cc/hr  7/25 INR 2.69  H&H 7.7/25.1 refusing Tube feeds on D5 1/2 normal  @50 cc/hr. This am + BM Melena Dr Oneill HF  aware- PRBC x1  GI team consulted -  NPO  plan on study in am-  D/w Dr Cadet Patient  to return to CTU for further management; 1PRBCS   7/27 Post op INR 2.2 today.  No bleeding. BC + for SM.  Pt is hypotensive requiring pressor and inotropic support.  ID follow up today on Cefepime will follow.  7/26 R PTC for PTX   7/28 CT C/A/P: sub q emphysema in R chest wall, GGO RUL, small ascites CTH negative; Abd US: GB thickening, pericholecystic fluid    7/30 perc choley  7/31 Perchole drain in place continues to drain total output overnight 133.  Fever today 38.8   8/1 duplex LE negative   8/7 Patient with persistent abdominal pain, refusing tube feeds and medications, Psych consulted  8/13 CTA A/P ordered to r/o mesenteric ischemia 2/2 persistent anorexia, nausea, vomiting. Revealed:  Evaluation of the mesenteric vessels is limited by streak artifact from LVAD. There appears to be severe stenosis of the proximal SMA; abdominal mesenteric doppler is recommended for further evaluation. 2.  No small bowel findings to suggest acute mesenteric ischemia. 3.  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  8/15: Cultures resulted BC 1/2 +GPC in clusters, SC enterobacter; mesenteric duplex: borderline stenosis of proximal SMA  8/27: CT C/A/P noncon: Nondular opacities in R lung apex w/cavitation, abd nl  8/31:  Continue current care, treatment of thyrotoxicosis with medications as per endocrine, d/c ABX as per team.   9/8 RUQ sono: Contracted gallbladder with cholecystostomy tube in place.  9/10 failed SMA stent, c/b RP bleed s/p 3U PRCB  9/12 CTA Abd/Pelvis L RP hematoma   9/24 Trach decannulated   9/26 intubated fro resp distress for increased WOB, LL pneumonia; CT C/A/P: progressive ISABELLE opacities and L consolidations, multifocal pneumonia   9/30 TTE (EF 25%, decreased RV, mod MR)   10/3 VT -> Lidocaine gtt   10/4 Trach size 6 DCT cuff  10/5 CT abd (neg for intra-abd abcess, severe stenosis of prox SMA and b/l iliac arteries)  10/18 SMA Stent   10/23 Emend for nausea   10/24 +Occult  10/29 FEES, RUQ ascites and pericholecystic fluid   Theckened liqwuids puree diet- calorie ct    Today:  Ambulation, TC, and PO diet as tolerated.  Day 2 of calorie count  Discussions yesterday as to discharge planning, agreeable to go to [facility] however will need to be decannulated and NGT removed.  Patient candidate for SDU.   11/4 Transferred to sdu.  Must be oob - chair for all meals.  Eventual change to uncuffed trach.  11/5 VSS; rsr/ st ; changed trach to #6 shiley cuffless this am by ENT- tolerated well; continue nutritional support with kaofeed; h/h stable 8/26 today   DNR rescinded today as per CHF/ LVAD team   11/6 VSS: pt tolerating supervised po diet; shantelle count in progress; trach care/suctioning q 3 hours;          65M PMH ACC/AHA stage D HF due to NICM HM2 LVAD , TV annuloplasty ring 9/12/17 as destination therapy due to severe peripheral artery disease with significant stenosis  SIADH, Depression, CKD-3 with hyperkalemia, past E. coli UTIs, driveline drainage (1/7/21) and COVID-19 (back in April 2020)  He was recently seen in clinic where he complained of abdominal pain and dark stools w constipation back in May. He presents to [facility] ER today weakness and fatigue, moderate and + Black stools for three days, on coumadin secondary to warfarin use in the setting of an LVAD. Patient has required transfusions for GIB in the past. Mostly recently back in jan 2021 pt had anemia with dark stools. No interventions was done at that time. However Last Endoscopy was done in July 2020 (negative). Today labs show patient is anemic with H/H of 4.5/16.3,. INR is 8.84 MAP in the 90s, Temp 35.1. He denies any chest pain, shortness of breath, dizziness, abd pain, nausea or vomiting.       (14 Jun 2021 16:57)  Surgery/Hospital Course:  6/14 admit for melena w/ anemia, INR 8.84   6/15 Capsul study (+) for small bowel bleed, balloon endoscopy (old blood in prox ileum); post EGD - septic w/ L opacity, re-intubated for concern for aspiration, TTE (Mod MR, decrease biV w/ interventricular septum boweing towards R)  6/17 bronch   6/20 +C Diff   6/22 CT C/A/P: Fluid filled colon which may be 2/2 rapid transit. Small bilateral pleffs with associates. Compressive atelectasis New ISABELLE & LLL  parenchymal opacities, suspicious for pneumonia. Moderate stenosis in the proximal superior mesenteric artery.   6/25 #8 Shiley trach at bedside   7/1 LVAD speed increased to 9200  7/2 Bronch  7/20 TC since 7/7. Patient transferred to SDU.   7/23 INR today 2.64.  H&H 7.3/24 this AM.  Will repeat CBC at noon, and will send stool guaiac Patient with persistent abdominal tenderness, rate of tube feeds decreased.  No nausea/vomiting.    7/24 INR today 2.4. H&H 9.1/28.6 low flow overnight /N&V, refusing Tube feeds on D5 1/2 normal  @50 cc/hr  7/25 INR 2.69  H&H 7.7/25.1 refusing Tube feeds on D5 1/2 normal  @50 cc/hr. This am + BM Melena Dr Oneill HF  aware- PRBC x1  GI team consulted -  NPO  plan on study in am-  D/w Dr Cadet Patient  to return to CTU for further management; 1PRBCS   7/27 Post op INR 2.2 today.  No bleeding. BC + for SM.  Pt is hypotensive requiring pressor and inotropic support.  ID follow up today on Cefepime will follow.  7/26 R PTC for PTX   7/28 CT C/A/P: sub q emphysema in R chest wall, GGO RUL, small ascites CTH negative; Abd US: GB thickening, pericholecystic fluid    7/30 perc choley  7/31 Perchole drain in place continues to drain total output overnight 133.  Fever today 38.8   8/1 duplex LE negative   8/7 Patient with persistent abdominal pain, refusing tube feeds and medications, Psych consulted  8/13 CTA A/P ordered to r/o mesenteric ischemia 2/2 persistent anorexia, nausea, vomiting. Revealed:  Evaluation of the mesenteric vessels is limited by streak artifact from LVAD. There appears to be severe stenosis of the proximal SMA; abdominal mesenteric doppler is recommended for further evaluation. 2.  No small bowel findings to suggest acute mesenteric ischemia. 3.  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  8/15: Cultures resulted BC 1/2 +GPC in clusters, SC enterobacter; mesenteric duplex: borderline stenosis of proximal SMA  8/27: CT C/A/P noncon: Nondular opacities in R lung apex w/cavitation, abd nl  8/31:  Continue current care, treatment of thyrotoxicosis with medications as per endocrine, d/c ABX as per team.   9/8 RUQ sono: Contracted gallbladder with cholecystostomy tube in place.  9/10 failed SMA stent, c/b RP bleed s/p 3U PRCB  9/12 CTA Abd/Pelvis L RP hematoma   9/24 Trach decannulated   9/26 intubated fro resp distress for increased WOB, LL pneumonia; CT C/A/P: progressive ISABELLE opacities and L consolidations, multifocal pneumonia   9/30 TTE (EF 25%, decreased RV, mod MR)   10/3 VT -> Lidocaine gtt   10/4 Trach size 6 DCT cuff  10/5 CT abd (neg for intra-abd abcess, severe stenosis of prox SMA and b/l iliac arteries)  10/18 SMA Stent   10/23 Emend for nausea   10/24 +Occult  10/29 FEES, RUQ ascites and pericholecystic fluid   Theckened liqwuids puree diet- calorie ct    Today:  Ambulation, TC, and PO diet as tolerated.  Day 2 of calorie count  Discussions yesterday as to discharge planning, agreeable to go to [facility] however will need to be decannulated and NGT removed.  Patient candidate for SDU.   11/4 Transferred to sdu.  Must be oob - chair for all meals.  Eventual change to uncuffed trach.  11/5 VSS; rsr/ st ; changed trach to #6 shiley cuffless this am by ENT- tolerated well; continue nutritional support with kaofeed; h/h stable 8/26 today   DNR rescinded today as per CHF/ LVAD team   11/6 VSS: pt tolerating supervised po diet; shantelle count in progress; trach care/suctioning q 3 hours; increase hydral 20 q8 as per CHF/LVAD team  discharge planning- rehab when stable

## 2021-11-06 NOTE — CHART NOTE - NSCHARTNOTEFT_GEN_A_CORE
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 coli UTIs, driveline drainage (1/7/21) and COVID-19 (April 2020). Seen in clinic for c/o abdominal pain and dark stools w constipation in May. He presents to [facility] ER 6/14 w/ weakness, fatigue, and + Black stools x3 days, warfarin use in setting of  LVAD. Pt has required transfusions for GIB in past. Most recently in jan 2021 pt had anemia with dark stools. No interventions at that time. Last Endoscopy done in July 2020 (negative). 6/14: admitted for melena w/ anemia, INR 8.84  Course: Stage D Nonischemic Cardiomyopathy, s/p HM2 9/2017; Cardiogenic shock; Hemodynamic instability; Acute hypoxemic respiratory failure s/p trach 6/25, decannulated 9/24; Reintubated 9/26; GI bleed, s/p Enteroscopy 6/14; Anemia in setting of melena; CKD; Stress hyperglycemia; C.diff positive 6/20; Hypovolemic shock; Septic shock; Leukocytosis; GB thickening/percholecystic s/p perc dawn by IR 7/30; SMA stenosis s/p SMA Stent on 10/18; Serratia/citrobacter PNA 7/7; Stenotrophomonas PNA 8/1; Enterobacter PNA 8/13; Nasuea/vomiting; Deconditioning; Hyponatremia. 6/15 Capsule study (+) for small bowel bleed, balloon endoscopy (old blood in prox ileum); post EGD - septic w/ L opacity, re-intubated for concern for aspiration, TTE (Mod MR, decrease biV w/ interventricular septum bowing towards R); 6/20 +C Diff;  7/24 low flow overnight /N&V resolved, refusing Tube feeds on D5. 7/25 + BM Melena - PRBC x1, GI consulted - NPO, returned to CTU; 1PRBC. 7/27 BC + for SM. hypotensive requiring pressor and inotropic support. 7/26 R PCT for PTX. 7/28 CT C/A/P: sub q emphysema in R chest wall, GGO RUL, small ascites CTH neg; Abd US: GB thickening, pericholecystic fluid. 7/31 Perchole drain in place continues to drain total output overnight 133. Fever 38.8 given tylenol. 8/7 Persistent abd pain, refusing tube feeds and meds, Psych consulted. 8/8 tolerating TC ATC  8/13 CTA A/P ordered to r/o mesenteric ischemia 2/2 persistent anorexia, nausea, vomiting. There appears to be severe stenosis of proximal SMA; No small bowel findings to suggest acute mesenteric ischemia. Focal dissections involving b/l common iliac arteries. 8/15: Cxs - BC 1/2 +GPC in clusters, SC enterobacter; mesenteric duplex: borderline stenosis of proximal SMA  8/27: CT C/A/P: Nodular opacities in R lung apex w/cavitation, abd nl. 8/31: Tx of thyrotoxicosis with meds as per endocrine, d/c ABX. 9/8 RUQ sono: Contracted gallbladder w/ cholecystostomy tube in place. 9/10 failed SMA stent, c/b RP bleed s/p 3U PRBC. 9/12 CTA A/P L RP hematoma. 9/24 Trach decannulated. 9/26 intubated for resp distress for increased WOB, LLL PNA; CT C/A/P: progressive ISABELLE opacities and L consolidations, multifocal PNA. 9/30 TTE (EF 25%, decreased RV, mod MR). 10/4 Trach size 6 DCT cuff. 10/5 CT abd (neg for intra-abd abcess, severe stenosis of prox SMA and b/l iliac arteries). 10/18 SMA Stent. 10/23 Emend for nausea. 10/24 +Occult, Tolerating TC since 10/19    Followed by this service for PMV trials and dysphagia tx, monitoring for PO candidacy, cleared for use of PMV for 15-20 minute intervals with 100% supervision on 10/26. Seen for FEES exam on 10/29 with Rx for Pureed texture diet with mildly thick liquids via cup sip (NO STRAWS).     11/5: Seen by ENT - #6 shiley cuffed to a #6 shiley uncuffed.    Today, Pt seen at bedside, awake and alert, oriented, verbally responsive, following directions for the purposes of the exam. Pt declining use of  for Creole at this time, despite encouragement. Pt seen for diet monitor during PMV trial. Pt was administered PO trials of puree, mildly-thick liquids, and single trial of soft and bite-sized texture. Pt presents with evidence of oropharyngeal swallow that appears to be improving. Pt with swallow timing that appears more timely than at time of FEES exam, palpable hyolaryngeal elevation/excursion and no overt s/s laryngeal penetration/aspiration across trials. Additionally, Pt denies sensation of pharyngeal retention with single small trial of soft and bite-sized texture, which he reported during FEES. Pt reportedly tolerating feeding without overt s/s laryngeal penetration/aspiration with strict guidelines including eating only when upright in chair, and assistance with feeding. Purposeful proactive rounding reinforced and 5 Ps addressed. Pt left in no distress.     Findings d/w NP Tara. As per discussion, team is considering possible decannulation next week. This service will continue to follow, and will plan for repeat FEES exam after decannulation, once stoma has had a chance to begin to close.    RECOMMENDATIONS:   Continue Pureed texture diet with Mildly Thick Liquids, SMALL SINGLE SIPS, NO STRAWS  1) Full assist with meals, 2) Crush meds or provide via alternate source, 3) Provide small single bites and sips at slow rate, 4) Encourage successive swallows for oral and pharyngeal clearance, 5) Alternate food and liquids to aid in oral and pharyngeal clearance, 6) Aspiration precautions. Monitor for s/s aspiration/laryngeal penetration. If noted:  D/C p.o. intake, provide non-oral nutrition/hydration/meds    Maintain good oral hygiene.   Restorative swallow therapy. Will continue to follow while patient is in-house, as schedule permits.   Subacute rehab on d/c.    Gege Hernandez MA, CCC-SLP  Speech-Language Pathologist  pager #[phone]

## 2021-11-06 NOTE — PROGRESS NOTE ADULT - ASSESSMENT
Assessment and Recommendation:   · Assessment	  Assessment and recommendation :  Recurrent Acute hypoxic respiratory Failure  FI02 is 40% S/P tracheostomy  on track. collar   Acute blood loss anemia S/P multiple  blood transfusion post tracheostomy   start Decannulation to cuffless trach.  recurrent  septic shock  weaned off  vasopressin   sepsis   on Cipro tablets   po vancomycin   C-Dificle colitis on po vancomycin   S/P cholecystostomy tube placement by IR    to repeat Abdominal ultra sound  patient is S/P  repeat vacular procedure and SMA stent   AF RVR back to regular sinus Rhythm   Non ischemic cardiomyopathy continue ACE inhibitor and B-Blockers   Stage D systolic heart failure S/P LVAD HM2   MH2 LVAD  with  TV Annuloplasty  Severe peripheral vascular disease   severe hyperglycemia on insulin coverage    Reglan 10 mg for Gastric Motility   hyperthyroidism on Methimazole 10mg TID   critical care polyneuropathy   Anemia of Acute blood Loss   severe protein caloric malnutrition   magnesium supplement keep above 2   Chronic kidney disease stage III  Depressed and withdrawn   on  NG tube feeding and PO trial  GI prophylaxis with PPI

## 2021-11-06 NOTE — SPEAKING VALVE EVALUATION - COMMENTS
7/24 low flow overnight /N&V resolved, refusing Tube feeds on D5. 7/25 + BM Melena - PRBC x1, GI consulted - NPO, returned to CTU; 1PRBC. 7/27 BC + for SM. hypotensive requiring pressor and inotropic support. 7/26 R PCT for PTX. 7/28 CT C/A/P: sub q emphysema in R chest wall, GGO RUL, small ascites CTH neg; Abd US: GB thickening, pericholecystic fluid. 7/31 Perchole drain in place continues to drain total output overnight 133. Fever 38.8 given tylenol. 8/7 Persistent abd pain, refusing tube feeds and meds, Psych consulted. 8/8 tolerating TC ATC  8/13 CTA A/P ordered to r/o mesenteric ischemia 2/2 persistent anorexia, nausea, vomiting. There appears to be severe stenosis of proximal SMA; No small bowel findings to suggest acute mesenteric ischemia. Focal dissections involving b/l common iliac arteries. 8/15: Cxs - BC 1/2 +GPC in clusters, SC enterobacter; mesenteric duplex: borderline stenosis of proximal SMA  8/27: CT C/A/P: Nodular opacities in R lung apex w/cavitation, abd nl. 8/31: Tx of thyrotoxicosis with meds as per endocrine, d/c ABX. 9/8 RUQ sono: Contracted gallbladder w/ cholecystostomy tube in place. 9/10 failed SMA stent, c/b RP bleed s/p 3U PRBC. 9/12 CTA A/P L RP hematoma. 9/24 Trach decannulated. 9/26 intubated for resp distress for increased WOB, LLL PNA; CT C/A/P: progressive ISABELLE opacities and L consolidations, multifocal PNA. 9/30 TTE (EF 25%, decreased RV, mod MR). 10/4 Trach size 6 DCT cuff. 10/5 CT abd (neg for intra-abd abcess, severe stenosis of prox SMA and b/l iliac arteries). 10/18 SMA Stent. 10/23 Emend for nausea. 10/24 +Occult, Tolerating TC since 10/19    Followed by this service for PMV trials and dysphagia tx, monitoring for PO candidacy, cleared for use of PMV for 15-20 minute intervals with 100% supervision on 10/26. Seen for FEES exam on 10/29 with Rx for Pureed texture diet with mildly thick liquids via cup sip (NO STRAWS).     11/5: Seen by ENT - #6 betzaida cuffed to a #6 jasminley uncuffed.
Sx/Hosp Course:  6/14 admit for melena w/ anemia, INR 8.84   6/15 Capsule study (+) for small bowel bleed, balloon endoscopy (old blood in prox ileum); post EGD - septic w/ L opacity, re-intubated for concern for aspiration, TTE (Mod MR, decrease biV w/ interventricular septum boweing towards R)  6/20 +C Diff   6/22 CT C/A/P: Fluid filled colon which may be 2/2 rapid transit. Small b/l pl effs with associated compressive atelectasis New ISABELLE & LLL  parenchymal opacities, suspicious for PNA. Mod stenosis in proximal superior mesenteric artery.   6/25 #8 Shiley trach at bedside   7/20 TC since 7/7, continue as tolerated. Pt transferred to SDU.   7/23 INR today 2.64.  H&H 7.3/24 this AM.  Will repeat CBC, will send stool guaiac. Persistent abdominal tenderness, rate of tube feeds decreased.  No N/V.    7/24 INR today 2.4H&H 9.1/28.6 low flow overnight /N&V  NPO resolved for capsule study mondayn Coumadin on hold, refusing Tube feeds on D5.  7/25 INR 2.69  H&H 7.7/25.1 refusing Tube feeds on D5 @50 cc/hr. This am + BM Melena - PRBC x1  GI team consulted - NPO, Pt to return to CTU for further management; 1PRBCS  7/27 Post op INR 2.2 today. No bleeding. BC + for SM. Pt hypotensive requiring pressor and inotropic support.   7/26 R PCT for PTX   7/28 CT C/A/P: sub q emphysema in R chest wall, GGO RUL, small ascites CTH negative; Abd US: GB thickening, pericholecystic fluid    7/31 Perchole drain in place continues to drain total output overnight 133.  Fever today 38.8 given tylenol.  Will repeat BC now.    8/1 duplex LE negative   8/7 Persistent abd pain, refusing tube feeds and meds, Psych consulted  8/8 tolerating TC ATC  8/10: Thyroid US to evaluate for thyroid nodule/goiter in setting of hyperthyroid - contraindicated at this time 2/2 trach, will continue methimazole and monitor TSH closely. Started on Propranolol for HR rate control from Hyperthyroidism. Given Ketamine 30 IV x2 for mood. Tolerating tube feeds now, increasing to goal and d/c'd maintenance fluids.
7/24 low flow overnight /N&V resolved, refusing Tube feeds on D5. 7/25 + BM Melena - PRBC x1, GI consulted - NPO, returned to CTU; 1PRBC. 7/27 BC + for SM. hypotensive requiring pressor and inotropic support. 7/26 R PCT for PTX. 7/28 CT C/A/P: sub q emphysema in R chest wall, GGO RUL, small ascites CTH neg; Abd US: GB thickening, pericholecystic fluid. 7/31 Perchole drain in place continues to drain total output overnight 133. Fever 38.8 given tylenol. 8/7 Persistent abd pain, refusing tube feeds and meds, Psych consulted. 8/8 tolerating TC ATC  8/13 CTA A/P ordered to r/o mesenteric ischemia 2/2 persistent anorexia, nausea, vomiting. There appears to be severe stenosis of proximal SMA; No small bowel findings to suggest acute mesenteric ischemia. Focal dissections involving b/l common iliac arteries. 8/15: Cxs - BC 1/2 +GPC in clusters, SC enterobacter; mesenteric duplex: borderline stenosis of proximal SMA  8/27: CT C/A/P: Nodular opacities in R lung apex w/cavitation, abd nl. 8/31: Tx of thyrotoxicosis with meds as per endocrine, d/c ABX. 9/8 RUQ sono: Contracted gallbladder w/ cholecystostomy tube in place. 9/10 failed SMA stent, c/b RP bleed s/p 3U PRBC. 9/12 CTA A/P L RP hematoma.     Followed by this service for PMV trials and dysphagia tx, monitoring for PO candidacy, cleared for use of PMV for 15-20 minute intervals with 100% supervision on 9/15.
7/24 low flow overnight /N&V resolved, refusing Tube feeds on D5. 7/25 + BM Melena - PRBC x1, GI consulted - NPO, returned to CTU; 1PRBC. 7/27 BC + for SM. hypotensive requiring pressor and inotropic support. 7/26 R PCT for PTX. 7/28 CT C/A/P: sub q emphysema in R chest wall, GGO RUL, small ascites CTH neg; Abd US: GB thickening, pericholecystic fluid. 7/31 Perchole drain in place continues to drain total output overnight 133. Fever 38.8 given tylenol. 8/7 Persistent abd pain, refusing tube feeds and meds, Psych consulted. 8/8 tolerating TC ATC  8/13 CTA A/P ordered to r/o mesenteric ischemia 2/2 persistent anorexia, nausea, vomiting. There appears to be severe stenosis of proximal SMA; No small bowel findings to suggest acute mesenteric ischemia. Focal dissections involving b/l common iliac arteries. 8/15: Cxs - BC 1/2 +GPC in clusters, SC enterobacter; mesenteric duplex: borderline stenosis of proximal SMA  8/27: CT C/A/P: Nodular opacities in R lung apex w/cavitation, abd nl. 8/31: Tx of thyrotoxicosis with meds as per endocrine, d/c ABX. 9/8 RUQ sono: Contracted gallbladder w/ cholecystostomy tube in place. 9/10 failed SMA stent, c/b RP bleed s/p 3U PRBC. 9/12 CTA A/P L RP hematoma. 9/24 Trach decannulated. 9/26 intubated for resp distress for increased WOB, LLL PNA; CT C/A/P: progressive ISABELLE opacities and L consolidations, multifocal PNA. 9/30 TTE (EF 25%, decreased RV, mod MR). 10/4 Trach size 6 DCT cuff. 10/5 CT abd (neg for intra-abd abcess, severe stenosis of prox SMA and b/l iliac arteries). 10/18 SMA Stent. 10/23 Emend for nausea. 10/24 +Occult, Tolerating TC since 10/19    Followed by this service for PMV trials and dysphagia tx, monitoring for PO candidacy.

## 2021-11-06 NOTE — SPEAKING VALVE EVALUATION - DIAGNOSTIC IMPRESSIONS
Pt seen for passy-darren speaking valve evaluation. Pt maintained on 35% FIO2 via trach collar. Low pressure valve placed on hub of trach. Patient was dysphonic with hoarse vocal quality, which is gradually improving in quality and intensity. Pt tolerated valve with VSS stable throughout assessment. No signs of respiratory distress. No increased work of breathing. No subjective complaints. No signs of air trapping. Valve removed after 110 minutes so that Pt could go to sleep. Pt and RN instructed in PMV use and precautions. Findings d/w DILIP Hernandez and PATIENCE Pacheco.

## 2021-11-06 NOTE — PROGRESS NOTE ADULT - SUBJECTIVE AND OBJECTIVE BOX
VITAL SIGNS    Telemetry:    Vital Signs Last 24 Hrs  T(C): 36.8 (21 @ 09:00), Max: 36.9 (21 @ 05:00)  T(F): 98.2 (21 @ 09:00), Max: 98.5 (21 @ 05:00)  HR: 111 (21 @ 10:31) (92 - 116)  BP: --  RR: 19 (21 @ 10:31) (16 - 191)  SpO2: 100% (21 @ 10:31) (100% - 100%)             07:01  -   @ 07:00  --------------------------------------------------------  IN: 2430 mL / OUT: 1250 mL / NET: 1180 mL     08:01  -   @ 11:27  --------------------------------------------------------  IN: 150 mL / OUT: 100 mL / NET: 50 mL       Daily     Daily Weight in k.1 (2021 09:00)  Admit Wt: Drug Dosing Weight  Height (cm): 177.8 (18 Oct 2021 15:55)  Weight (kg): 61.6 (18 Oct 2021 15:55)  BMI (kg/m2): 19.5 (18 Oct 2021 15:55)  BSA (m2): 1.77 (18 Oct 2021 15:55)    Bilirubin Total, Serum: 0.4 mg/dL ( 07:01)    CAPILLARY BLOOD GLUCOSE      POCT Blood Glucose.: 122 mg/dL (2021 06:34)  POCT Blood Glucose.: 158 mg/dL (2021 23:58)  POCT Blood Glucose.: 139 mg/dL (2021 17:10)  POCT Blood Glucose.: 147 mg/dL (2021 11:51)          aspirin  chewable 81 milliGRAM(s) Oral daily  chlorhexidine 2% Cloths 1 Application(s) Topical <User Schedule>  ciprofloxacin     Tablet 500 milliGRAM(s) Oral every 12 hours  guaiFENesin Oral Liquid (Sugar-Free) 200 milliGRAM(s) Oral every 4 hours PRN  hydrALAZINE 10 milliGRAM(s) Oral every 8 hours  insulin lispro (ADMELOG) corrective regimen sliding scale   SubCutaneous every 6 hours  magnesium oxide 400 milliGRAM(s) Oral every 12 hours  methimazole 10 milliGRAM(s) Oral daily  metoclopramide Injectable 10 milliGRAM(s) IV Push every 8 hours  metoprolol tartrate 12.5 milliGRAM(s) Oral every 8 hours  mirtazapine 7.5 milliGRAM(s) Oral at bedtime  multivitamin 1 Tablet(s) Oral daily  ondansetron Injectable 8 milliGRAM(s) IV Push every 8 hours  pantoprazole  Injectable 40 milliGRAM(s) IV Push every 12 hours  sertraline 100 milliGRAM(s) Oral daily  simethicone 80 milliGRAM(s) Chew every 8 hours PRN  thiamine 100 milliGRAM(s) Oral daily  urea Oral Powder 15 Gram(s) Oral daily  vancomycin    Solution 125 milliGRAM(s) Oral every 12 hours      PHYSICAL EXAM    Subjective: "Hi.   Neurology: alert and oriented x 3, nonfocal, no gross deficits  CV : tele:  RSR  Sternal Wound :  CDI with dressing , Stable  Lungs: clear. RR easy, unlabored   Abdomen: soft, nontender, nondistended, positive bowel sounds, bowel movement   Neg N/V/D   :  pt voiding without difficulty   Extremities:   BLANDON; edema, neg calf tenderness.   PPP bilaterally      PW:  Chest tubes:                 VITAL SIGNS    Telemetry:  rsr/st    Vital Signs Last 24 Hrs  T(C): 36.8 (21 @ 09:00), Max: 36.9 (21 @ 05:00)  T(F): 98.2 (21 @ 09:00), Max: 98.5 (21 @ 05:00)  HR: 111 (21 @ 10:31) (92 - 116)  BP: --  RR: 19 (21 @ 10:31) (16 - 191)  SpO2: 100% (21 @ 10:31) (100% - 100%)             @ 07:01  -   @ 07:00  --------------------------------------------------------  IN: 2430 mL / OUT: 1250 mL / NET: 1180 mL     @ 08:01  -   @ 11:27  --------------------------------------------------------  IN: 150 mL / OUT: 100 mL / NET: 50 mL       Daily     Daily Weight in k.1 (2021 09:00)  Admit Wt: Drug Dosing Weight  Height (cm): 177.8 (18 Oct 2021 15:55)  Weight (kg): 61.6 (18 Oct 2021 15:55)  BMI (kg/m2): 19.5 (18 Oct 2021 15:55)  BSA (m2): 1.77 (18 Oct 2021 15:55)    Bilirubin Total, Serum: 0.4 mg/dL ( 07:01)    CAPILLARY BLOOD GLUCOSE      POCT Blood Glucose.: 122 mg/dL (2021 06:34)  POCT Blood Glucose.: 158 mg/dL (2021 23:58)  POCT Blood Glucose.: 139 mg/dL (2021 17:10)  POCT Blood Glucose.: 147 mg/dL (2021 11:51)          aspirin  chewable 81 milliGRAM(s) Oral daily  chlorhexidine 2% Cloths 1 Application(s) Topical <User Schedule>  ciprofloxacin     Tablet 500 milliGRAM(s) Oral every 12 hours  guaiFENesin Oral Liquid (Sugar-Free) 200 milliGRAM(s) Oral every 4 hours PRN  hydrALAZINE 10 milliGRAM(s) Oral every 8 hours  insulin lispro (ADMELOG) corrective regimen sliding scale   SubCutaneous every 6 hours  magnesium oxide 400 milliGRAM(s) Oral every 12 hours  methimazole 10 milliGRAM(s) Oral daily  metoclopramide Injectable 10 milliGRAM(s) IV Push every 8 hours  metoprolol tartrate 12.5 milliGRAM(s) Oral every 8 hours  mirtazapine 7.5 milliGRAM(s) Oral at bedtime  multivitamin 1 Tablet(s) Oral daily  ondansetron Injectable 8 milliGRAM(s) IV Push every 8 hours  pantoprazole  Injectable 40 milliGRAM(s) IV Push every 12 hours  sertraline 100 milliGRAM(s) Oral daily  simethicone 80 milliGRAM(s) Chew every 8 hours PRN  thiamine 100 milliGRAM(s) Oral daily  urea Oral Powder 15 Gram(s) Oral daily  vancomycin    Solution 125 milliGRAM(s) Oral every 12 hours      PHYSICAL EXAM    Subjective: "Hi."   Neurology: alert and oriented, nonfocal, no gross deficits  CV : tele:   rsr/ st 's; typical vad hum   Sternal Wound :  CDI TOM- well healed   Lungs: + bilateral rhonchi noted; trach changed to #6 cuffless shiley this am; suctioning q2-3 hours; RR easy, unlabored   Abdomen: soft, nontender, nondistended, positive bowel sounds, + bowel movement   Neg N/V/D ; driveline cdi w/ dressing; + kaofeed tf vital @ 50 cc/ hr ; + choly drain    :  pt voiding without difficulty   Extremities:   BLANDON; neg LE edema, neg calf tenderness.   PPP bilaterally; +RUE midline cdi w/ dressing; + LUE staples cdi tom

## 2021-11-07 NOTE — PROGRESS NOTE ADULT - ASSESSMENT
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 coli UTIs, driveline drainage (1/7/21) and COVID-19 (back in April 2020)  He was recently seen in clinic where he complained of abdominal pain and dark stools w constipation back in May. He presents to [facility] ER today weakness and fatigue, moderate and + Black stools for three days, on coumadin secondary to warfarin use in the setting of an LVAD. Patient has required transfusions for GIB in the past. Mostly recently back in jan 2021 pt had anemia with dark stools. No interventions was done at that time. However Last Endoscopy was done in July 2020 (negative). Today labs show patient is anemic with H/H of 4.5/16.3,. INR is 8.84 MAP in the 90s, Temp 35.1. He denies any chest pain, shortness of breath, dizziness, abd pain, nausea or vomiting.       (14 Jun 2021 16:57)  Surgery/Hospital Course:  6/14 admit for melena w/ anemia, INR 8.84   6/15 Capsul study (+) for small bowel bleed, balloon endoscopy (old blood in prox ileum); post EGD - septic w/ L opacity, re-intubated for concern for aspiration, TTE (Mod MR, decrease biV w/ interventricular septum boweing towards R)  6/17 bronch   6/20 +C Diff   6/22 CT C/A/P: Fluid filled colon which may be 2/2 rapid transit. Small bilateral pleffs with associates. Compressive atelectasis New ISABELLE & LLL  parenchymal opacities, suspicious for pneumonia. Moderate stenosis in the proximal superior mesenteric artery.   6/25 #8 Shiley trach at bedside   7/1 LVAD speed increased to 9200  7/2 Bronch  7/20 TC since 7/7. Patient transferred to SDU.   7/23 INR today 2.64.  H&H 7.3/24 this AM.  Will repeat CBC at noon, and will send stool guaiac Patient with persistent abdominal tenderness, rate of tube feeds decreased.  No nausea/vomiting.    7/24 INR today 2.4. H&H 9.1/28.6 low flow overnight /N&V, refusing Tube feeds on D5 1/2 normal  @50 cc/hr  7/25 INR 2.69  H&H 7.7/25.1 refusing Tube feeds on D5 1/2 normal  @50 cc/hr. This am + BM Melena Dr Oneill HF  aware- PRBC x1  GI team consulted -  NPO  plan on study in am-  D/w Dr Cadet Patient  to return to CTU for further management; 1PRBCS   7/27 Post op INR 2.2 today.  No bleeding. BC + for SM.  Pt is hypotensive requiring pressor and inotropic support.  ID follow up today on Cefepime will follow.  7/26 R PTC for PTX   7/28 CT C/A/P: sub q emphysema in R chest wall, GGO RUL, small ascites CTH negative; Abd US: GB thickening, pericholecystic fluid    7/30 perc choley  7/31 Perchole drain in place continues to drain total output overnight 133.  Fever today 38.8   8/1 duplex LE negative   8/7 Patient with persistent abdominal pain, refusing tube feeds and medications, Psych consulted  8/13 CTA A/P ordered to r/o mesenteric ischemia 2/2 persistent anorexia, nausea, vomiting. Revealed:  Evaluation of the mesenteric vessels is limited by streak artifact from LVAD. There appears to be severe stenosis of the proximal SMA; abdominal mesenteric doppler is recommended for further evaluation. 2.  No small bowel findings to suggest acute mesenteric ischemia. 3.  Focal dissections involving the right and left common iliac arteries.  8/15: Cultures resulted BC 1/2 +GPC in clusters, SC enterobacter; mesenteric duplex: borderline stenosis of proximal SMA  8/27: CT C/A/P noncon: Nondular opacities in R lung apex w/cavitation, abd nl  8/31:  Continue current care, treatment of thyrotoxicosis with medications as per endocrine, d/c ABX as per team.   9/8 RUQ sono: Contracted gallbladder with cholecystostomy tube in place.  9/10 failed SMA stent, c/b RP bleed s/p 3U PRCB  9/12 CTA Abd/Pelvis L RP hematoma   9/24 Trach decannulated   9/26 intubated fro resp distress for increased WOB, LL pneumonia; CT C/A/P: progressive ISABELLE opacities and L consolidations, multifocal pneumonia   9/30 TTE (EF 25%, decreased RV, mod MR)   10/3 VT -> Lidocaine gtt   10/4 Trach size 6 DCT cuff  10/5 CT abd (neg for intra-abd abcess, severe stenosis of prox SMA and b/l iliac arteries)  10/18 SMA Stent   10/23 Emend for nausea   10/24 +Occult  10/29 FEES, RUQ ascites and pericholecystic fluid   Theckened liqwuids puree diet- calorie ct    Today:  Ambulation, TC, and PO diet as tolerated.  Day 2 of calorie count  Discussions yesterday as to discharge planning, agreeable to go to [facility] however will need to be decannulated and NGT removed.  Patient candidate for SDU.   11/4 Transferred to sdu.  Must be oob - chair for all meals.  Eventual change to uncuffed trach.  11/5 VSS; rsr/ st ; changed trach to #6 shiley cuffless this am by ENT- tolerated well; continue nutritional support with kaofeed; h/h stable 8/26 today   DNR rescinded today as per CHF/ LVAD team   11/6 VSS: pt tolerating supervised po diet; shantelle count in progress; trach care/suctioning q 3 hours; increase hydral 20 q8 as per CHF/LVAD team  11/7   OOB to chair,   vss   keofeed w/ shantelle count         65M PMH ACC/AHA stage D HF due to NICM HM2 LVAD , TV annuloplasty ring 9/12/17 as destination therapy due to severe peripheral artery disease with significant stenosis  SIADH, Depression, CKD-3 with hyperkalemia, past E. coli UTIs, driveline drainage (1/7/21) and COVID-19 (back in April 2020)  He was recently seen in clinic where he complained of abdominal pain and dark stools w constipation back in May. He presents to [facility] ER today weakness and fatigue, moderate and + Black stools for three days, on coumadin secondary to warfarin use in the setting of an LVAD. Patient has required transfusions for GIB in the past. Mostly recently back in jan 2021 pt had anemia with dark stools. No interventions was done at that time. However Last Endoscopy was done in July 2020 (negative). Today labs show patient is anemic with H/H of 4.5/16.3,. INR is 8.84 MAP in the 90s, Temp 35.1. He denies any chest pain, shortness of breath, dizziness, abd pain, nausea or vomiting.       (14 Jun 2021 16:57)  Surgery/Hospital Course:  6/14 admit for melena w/ anemia, INR 8.84   6/15 Capsul study (+) for small bowel bleed, balloon endoscopy (old blood in prox ileum); post EGD - septic w/ L opacity, re-intubated for concern for aspiration, TTE (Mod MR, decrease biV w/ interventricular septum boweing towards R)  6/17 bronch   6/20 +C Diff   6/22 CT C/A/P: Fluid filled colon which may be 2/2 rapid transit. Small bilateral pleffs with associates. Compressive atelectasis New ISABELLE & LLL  parenchymal opacities, suspicious for pneumonia. Moderate stenosis in the proximal superior mesenteric artery.   6/25 #8 Shiley trach at bedside   7/1 LVAD speed increased to 9200  7/2 Bronch  7/20 TC since 7/7. Patient transferred to SDU.   7/23 INR today 2.64.  H&H 7.3/24 this AM.  Will repeat CBC at noon, and will send stool guaiac Patient with persistent abdominal tenderness, rate of tube feeds decreased.  No nausea/vomiting.    7/24 INR today 2.4. H&H 9.1/28.6 low flow overnight /N&V, refusing Tube feeds on D5 1/2 normal  @50 cc/hr  7/25 INR 2.69  H&H 7.7/25.1 refusing Tube feeds on D5 1/2 normal  @50 cc/hr. This am + BM Melena Dr Oneill HF  aware- PRBC x1  GI team consulted -  NPO  plan on study in am-  D/w Dr Cadet Patient  to return to CTU for further management; 1PRBCS   7/27 Post op INR 2.2 today.  No bleeding. BC + for SM.  Pt is hypotensive requiring pressor and inotropic support.  ID follow up today on Cefepime will follow.  7/26 R PTC for PTX   7/28 CT C/A/P: sub q emphysema in R chest wall, GGO RUL, small ascites CTH negative; Abd US: GB thickening, pericholecystic fluid    7/30 perc choley  7/31 Perchole drain in place continues to drain total output overnight 133.  Fever today 38.8   8/1 duplex LE negative   8/7 Patient with persistent abdominal pain, refusing tube feeds and medications, Psych consulted  8/13 CTA A/P ordered to r/o mesenteric ischemia 2/2 persistent anorexia, nausea, vomiting. Revealed:  Evaluation of the mesenteric vessels is limited by streak artifact from LVAD. There appears to be severe stenosis of the proximal SMA; abdominal mesenteric doppler is recommended for further evaluation. 2.  No small bowel findings to suggest acute mesenteric ischemia. 3.  Focal dissections involving the right and left common iliac arteries.  8/15: Cultures resulted BC 1/2 +GPC in clusters, SC enterobacter; mesenteric duplex: borderline stenosis of proximal SMA  8/27: CT C/A/P noncon: Nondular opacities in R lung apex w/cavitation, abd nl  8/31:  Continue current care, treatment of thyrotoxicosis with medications as per endocrine, d/c ABX as per team.   9/8 RUQ sono: Contracted gallbladder with cholecystostomy tube in place.  9/10 failed SMA stent, c/b RP bleed s/p 3U PRCB  9/12 CTA Abd/Pelvis L RP hematoma   9/24 Trach decannulated   9/26 intubated fro resp distress for increased WOB, LL pneumonia; CT C/A/P: progressive ISABELLE opacities and L consolidations, multifocal pneumonia   9/30 TTE (EF 25%, decreased RV, mod MR)   10/3 VT -> Lidocaine gtt   10/4 Trach size 6 DCT cuff  10/5 CT abd (neg for intra-abd abcess, severe stenosis of prox SMA and b/l iliac arteries)  10/18 SMA Stent   10/23 Emend for nausea   10/24 +Occult  10/29 FEES, RUQ ascites and pericholecystic fluid   Theckened liqwuids puree diet- calorie ct    Today:  Ambulation, TC, and PO diet as tolerated.  Day 2 of calorie count  Discussions yesterday as to discharge planning, agreeable to go to [facility] however will need to be decannulated and NGT removed.  Patient candidate for SDU.   11/4 Transferred to sdu.  Must be oob - chair for all meals.  Eventual change to uncuffed trach.  11/5 VSS; rsr/ st ; changed trach to #6 shiley cuffless this am by ENT- tolerated well; continue nutritional support with kaofeed; h/h stable 8/26 today   DNR rescinded today as per CHF/ LVAD team   11/6 VSS: pt tolerating supervised po diet; shantelle count in progress; trach care/suctioning q 3 hours; increase hydral 20 q8 as per CHF/LVAD team  11/7   OOB to chair,   vss   karrieofvenkat w/ shantelle count    dawn drain    ald 25 qd  added by HF,  no LVAD alarms

## 2021-11-07 NOTE — PROGRESS NOTE ADULT - PROBLEM SELECTOR PLAN 4
s/p stent  continue asa 81 qd  monitor for s/s of bleeding s/p stent    lt upper staples  continue asa 81 qd

## 2021-11-07 NOTE — PROGRESS NOTE ADULT - SUBJECTIVE AND OBJECTIVE BOX
VITAL SIGNS    Telemetry:      Vital Signs Last 24 Hrs  T(C): 36.6 (21 @ 05:30), Max: 36.9 (21 @ 14:45)  T(F): 97.9 (21 @ 05:30), Max: 98.4 (21 @ 14:45)  HR: 98 (21 @ 06:37) (92 - 122)  BP: 97/68 (21 @ 03:11) (97/68 - 97/68)  RR: 16 (21 @ 06:37) (16 - 19)  SpO2: 100% (21 @ 06:37) (99% - 100%)                   Daily     Daily Weight in k.5 (2021 06:42)      Bilirubin Total, Serum: 0.4 mg/dL ( @ 07:01)    CAPILLARY BLOOD GLUCOSE      POCT Blood Glucose.: 129 mg/dL (2021 06:36)  POCT Blood Glucose.: 131 mg/dL (2021 00:07)  POCT Blood Glucose.: 118 mg/dL (2021 17:55)  POCT Blood Glucose.: 163 mg/dL (2021 12:33)          Drains:dawn                        PHYSICAL EXAM  s"  Neurology: alert and oriented x 3, moves all extremities with no defecits  CV :  RRR  Sternal Wound :  CDI , Stable  Lungs:   CTA B/L  Abdomen: soft, nontender, nondistended, positive bowel sounds, last bowel movement   Extremities:

## 2021-11-07 NOTE — PROGRESS NOTE ADULT - PROBLEM SELECTOR PLAN 7
- currently tolerating PO pureed diet along with tube feeds, will continue to advance diet as tolerated  - undergoing calorie count (was started 11/3). - currently tolerating PO pureed diet along with tube feeds, will continue to advance diet as tolerated  - undergoing calorie count (was started 11/3).  - will remove NG tube, after completion of calorie count, and assessment of adequate nutrition.  - Encourage PO diet.

## 2021-11-07 NOTE — PROGRESS NOTE ADULT - SUBJECTIVE AND OBJECTIVE BOX
RICKY JOINT  MRN#: [id number]  Subjective:  Pulmonary progress  : recurrent Acute hypoxic respiratory Failure ,aspiration pneumonia, NICM  ,   65M PMH ACC/AHA stage D HF due to NICM HM2 LVAD , TV annuloplasty ring 17 as destination therapy due to severe peripheral artery disease with significant stenosis  SIADH, Depression, CKD-3 with hyperkalemia, past E. coli UTIs, driveline drainage (21) and COVID-19 (back in 2020)  He was recently seen in clinic where he complained of abdominal pain and dark stools w constipation back in May. He presents to [facility] ER today weakness and fatigue, moderate and + Black stools for three days, on coumadin secondary to warfarin use in the setting of an LVAD. Patient has required transfusions for GIB in the past. Mostly recently back in 2021 pt had anemia with dark stools. No interventions was done at that time. However Last Endoscopy was done in 2020 (negative). Today labs show patient is anemic with H/H of 4.5/16.3,. INR is 8.84 MAP in the 90s, Temp 35.1. He denies any chest pain, shortness of breath, dizziness, abd pain, nausea or vomiting. found to have  rectal bleeding underwent endoscopy ,old blood in the proximal ileum ,  develop sepsis with LL opacity given Antibiotics , Extubated , reintubated , Bronchoscopy on Zosyn for LL pneumonia  and Amiodarone S/P TV Annuloplasty , patient remain intubated on full ventilatory support .S/P multiple units of blood transfusion , remain on full ventilatory support on Precedex and propofol , new central IJ line , diarrhea C diff. +ve on po vancomycin and IV Flagyl,  mildly distended belly , fever start on cefepime 2gm q 8 hrs S/P tracheostomy .  new RT Subclavian central line continue on contact  isolation ,S/P  C-diff antibiotics, no more diarrhea back on full support mechanical ventilator , chest x ray show improvement in LLL air space disease, more awake and responsive on tube feeding no more diarrhea ,  no nausea or vomiting or diarrhea still very weak and tired , back on tube feeding ,still on po vancomycin , getting PT and OT at bed side ,   , no SOB getting stronger , improve muscle tone patient transfer to monitor bed still on contact isolation for C-Difficel colitis on 50% FI02, and change to Suresh  tube feeding still loose stool . H/H drop significantly require blood transfusion , most likely GI bleeding , IV heparin D/C ,  H/H is stable ., patient develop TR sided  pneumothorax require chest tube placement , RT IJ central line  placed , develop fever shaking chills , blood culture positive for serratia marcescens , start on cefepime .the patient  become hypoxic and hypotensive placed on full ventilatory support and Vasopressin , levophed and Dobutamine ,S/P blood transfusion on meropenem and vancomycin ,   , on and  off pressors , occasional agitation on Precedex .S/P IR cholecystostomy tube drainage placement in the RT upper Quadrant , resume anticoagulation chest x ray noted C-PAP trail lasted only for 2 hrs , new RT SC line and D/C RT IJ line , RT pig tail cathter has been removed , tolerating C-PAP trial placed on trach. collar 50% FI02 GI consultant noted on NG tube feeding as tolerated , develop AF RVR S/P  bolus Amiodarone  back to regular sinus Rhythm , Flat Affect depressed , back on tube feeding Vital AF at 60 cc/ hr .still intermittent abdominal pain , no fever saturation is accepted  back on full ventilatory support ,   on methimazole for hyperthyroidism ,  condition is the same ,still C/O Abdominal pain , white count is improving , no chest pain or SOB ,  .placed on Reglan 10 mg TID for gastric motility , depressed , withdrawn. , S/P  mesenteric angiogram , unable to stent SMA S/P 3 units of blood transfusion   RT IJ in place reassessed for stent in the SMA by vascular,  dark stool stable H/H ,surgical opinion for possible Lap cholecystectomy. over night events noted develop respiratory distress, , rectal bleeding, require intubation placed on full ventilatory support , FI02 is 50% also became hemianosmically unstable require triple pressor support with levophed , vasopressin and norsynephrine, pan culture placed  on Vancomycin IV and PO  and Zosyn, sedated with propofol kept NPO , new central line RT and left IJ cathter placed . Diflucan Added .fever of 38.5 weaned off  vasopressin ,   fever adding IV vancomycin and  vancomycin solution  , and Bactrim , S/P  tracheostomy , bleeding receiving blood transfusion on vasopressin , no more bleeding , no fever , more awake and responsive ,tolerating tube feeding , ID and heart failure follow up appreciated , depresses no weaning for now , magnesium is low to give supplement too keep Above 2 , patient S/P  vascular procedure for superior mesenteric artery occlusion S/P 2 stent placement , patient tolerate it very well  , left upper extremities hematoma .vascular follow up appreciated , increase WBC , new RT UPE midline , black tarry stool , very loose R/O recurrent GI bleeding and C-dificile  colitis stable H/H no events over night , diarrhea is improved ,  WBC is improving  , hyponatremia no change , S/P FEEST study result is pending , PO feeding with observation . on Cipro antibiotics  no major over night events , worsening LFTs for repeat Abdominal sonogram pending , still diarrhea .but less , trial of po feeding , on monitor bed .start decannulation to cuffless trach . on 40% FI02, po feeding no events of aspiration .consider capping the trach. no fever minimal secretion      (2021 16:57)    PAST MEDICAL & SURGICAL HISTORY:  CHF (congestive heart failure)    CAD (coronary artery disease)  Depression    Pleural effusion    History of 2019 novel coronavirus disease (COVID-19)  2020    Hemorrhoids    Bleeding hemorrhoids    Peripheral arterial disease    Claudication    BPH with urinary obstruction    ACC/AHA stage D systolic heart failure  Anticoagulation goal of INR 2.0 to 2.5    Falls    Clavicle fracture    CKD (chronic kidney disease), stage III    Iron deficiency anemia    H/O epistaxis    Vertigo    GI bleed    S/P TVR (tricuspid valve repair)    S/P ventricular assist device    S/P endoscopy    OBJECTIVE:  ICU Vital Signs Last 24 Hrs  T(C): 38.2 (2021 10:00), Max: 38.5 (2021 12:00)  T(F): 100.8 (2021 10:00), Max: 101.3 (2021 12:00)  HR: 65 (2021 10:00) (61 - 69)  BP: --  BP(mean): --  ABP: 105/67 (2021 10:00) (90/54 - 113/64)  ABP(mean): 77 (2021 10:00) (63 - 77)  RR: 20 (2021 10:00) (19 - 35)  SpO2: 99% (2021 10:00) (96% - 100%)      - @ 07:01  -  06- @ 07:00  --------------------------------------------------------  IN: 2693.9 mL / OUT: 1415 mL / NET: 1278.9 mL     @ 07: @ 10:49  --------------------------------------------------------  IN: 420.8 mL / OUT: 115 mL / NET: 305.8 mL  PHYSICAL EXAM: Daily   Elderly male  on trach. collar  FI02 is 40% S/P tracheostomy   Daily Weight in k.4 (2021 00:00)  HEENT:     + NCAT  + EOMI  - Conjuctival edema   - Icterus   - Thrush   - ETT  + NGT/OGT  Neck:         + FROM  + JVD  - Nodes - Masses + Mid-line trachea + Tracheostomy  Chest:            normal A-P diameter    Lungs:          + CTA   + Rhonchi    - Rales    - Wheezing + Decreased  LT BS   - Dullness R L  Cardiac:       + S1 + S2    + RRR   - Irregular   - S3  - S4    - Murmurs   - Rub   - Hamman’s sign   Abdomen:    + BS  + Soft + Non-tender - Distended - Organomegaly - PEG .cholecystostomy tube in place  Extremities:   - Cyanosis U/L   - Clubbing  U/L  + LE/UE Edema LUE hematoma   + Capillary refill    + Pulses   Neuro:        - Awake   -  Alert   - Confused   - Lethargic   + Sedated  + Generalized Weakness  Skin:        - Rashes    - Erythema   + Normal incisions   + IV sites intact          HOSPITAL MEDICATIONS: All medications reviewed and analyzed  MEDICATIONS  (STANDING):  amiodarone    Tablet 200 milliGRAM(s) Oral daily  chlorhexidine 0.12% Liquid 15 milliLiter(s) Oral Mucosa every 12 hours  chlorhexidine 2% Cloths 1 Application(s) Topical <User Schedule>  dexmedetomidine Infusion 0.5 MICROgram(s)/kG/Hr (9.81 mL/Hr) IV Continuous <Continuous>  dextrose 50% Injectable 50 milliLiter(s) IV Push every 15 minutes  heparin  Infusion 400 Unit(s)/Hr (12.5 mL/Hr) IV Continuous <Continuous>  Hydromorphone  Injectable 0.5 milliGRAM(s) IV Push once  insulin lispro (ADMELOG) corrective regimen sliding scale   SubCutaneous every 6 hours  pantoprazole  Injectable 40 milliGRAM(s) IV Push every 12 hours  piperacillin/tazobactam IVPB.. 3.375 Gram(s) IV Intermittent every 8 hours  propofol Infusion 20 MICROgram(s)/kG/Min (9.42 mL/Hr) IV Continuous <Continuous>  sodium chloride 0.9% lock flush 3 milliLiter(s) IV Push every 8 hours  sodium chloride 0.9%. 1000 milliLiter(s) (10 mL/Hr) IV Continuous <Continuous>    MEDICATIONS  (PRN):  acetaminophen    Suspension .. 650 milliGRAM(s) Oral every 6 hours PRN Temp greater or equal to 38C (100.4F)    LABS: All Lab data reviewed and analyzed                        8.8    8.72  )-----------( 272      ( 2021 05:38 )             27.7                  134<L>  |  97  |  11  ----------------------------<  103<H>  4.1   |  26  |  0.49<L>    Ca    9.6      2021 05:38  Phos  3.3     11-06  Mg     1.5     -    TPro  7.4  /  Alb  3.7  /  TBili  0.4  /  DBili  x   /  AST  31  /  ALT  42  /  AlkPhos  178<H>      Ca    9.2      2021 07:01  Phos  3.3     11-06  Mg     1.5     -    TPro  7.4  /  Alb  3.7  /  TBili  0.4  /  DBili  x   /  AST  31  /  ALT  42  /  AlkPhos  178<H>        Ca    9.3      2021 06:59  Phos  2.6     11-04  Mg     1.8     -    TPro  7.7  /  Alb  3.8  /  TBili  0.4  /  DBili  x   /  AST  26  /  ALT  42  /  AlkPhos  174<H>                                                                                                                                                                                             PTT - ( 2021 04:52 )  PTT:45.2 sec LIVER FUNCTIONS - ( 2021 00:42 )  Alb: 3.4 g/dL / Pro: 6.7 g/dL / ALK PHOS: 213 U/L / ALT: 15 U/L / AST: 24 U/L / GGT: x           RADIOLOGY: - Reviewed and analyzed  , LVAD HM2, CT scan of abdomen reviewed result noted

## 2021-11-07 NOTE — PROGRESS NOTE ADULT - PROBLEM SELECTOR PLAN 1
HF follow up  mAINTAIN CURRENT SPEED.  asa 81 qd   continue lopressor 12.5 mg po bid   mag oxide   increase hydral 20 q8 as per CHF/LVAD team  discharge planning- rehab when stable

## 2021-11-07 NOTE — PROGRESS NOTE ADULT - PROBLEM SELECTOR PLAN 2
- Stage D Heart Failure with Heartmate 2.   - Stable w/o evidence of LVAD malfunction, 9200 rpm, PI in 5's, stable.  - Started on ASA 81 mg PO QD. Remains off Coumadin, h/o recurrent GI bleeding  - Monitor Hb/Hct.   - LDH elevated but stable   - now DNR. - Stage D Heart Failure with Heartmate 2.   - Stable w/o evidence of LVAD malfunction, 9200 rpm, PI in 6's, stable.  - Started on ASA 81 mg PO QD. Remains off Coumadin, h/o recurrent GI bleeding  - Monitor Hb/Hct.   - LDH elevated but stable   - now DNR.

## 2021-11-07 NOTE — PROGRESS NOTE ADULT - PROBLEM SELECTOR PLAN 2
Suction prn- approx q3 hours   changed trach to #6 shiley cuffless this am by ENT- tolerated well  monitor O2 sats

## 2021-11-07 NOTE — PROGRESS NOTE ADULT - PROBLEM SELECTOR PLAN 1
HF follow up  mAINTAIN CURRENT SPEED.  asa 81 qd   continue lopressor 12.5 mg po bid      hydral 20 q8 as per CHF/LVAD team   rehab when stable   consisder removing  keofeed   monday  if shantelle count sufficient HF follow up   added ald 25 qd  mAINTAIN CURRENT SPEED.  asa 81 qd   continue lopressor 12.5 mg po bid   rt upper extremity  midline for access   hydral 20 q8 as per CHF/LVAD team   rehab when stable   consisder removing  keofeed   monday  if shantelle count sufficient

## 2021-11-07 NOTE — PROGRESS NOTE ADULT - PROBLEM SELECTOR PLAN 4
- s/p trach on 10/4, currently tolerating trach collar.   - changed trach to #6 shiley cuffless by ENT 11/6.  - after family meeting, decision to keep tracheostomy tube for now.

## 2021-11-07 NOTE — PROGRESS NOTE ADULT - ASSESSMENT
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 coli UTIs, driveline drainage (1/7/21) and COVID-19 (back in April 2020)  He was recently seen in clinic where he complained of abdominal pain and dark stools w constipation back in May. He presents to [facility] ER today weakness and fatigue, moderate and + Black stools for three days, on coumadin secondary to warfarin use in the setting of an LVAD. Patient has required transfusions for GIB in the past. Mostly recently back in jan 2021 pt had anemia with dark stools. No interventions was done at that time. However Last Endoscopy was done in July 2020 (negative). Today labs show patient is anemic with H/H of 4.5/16.3,. INR is 8.84 MAP in the 90s, Temp 35.1. He denies any chest pain, shortness of breath, dizziness, abd pain, nausea or vomiting.       (14 Jun 2021 16:57)  Surgery/Hospital Course:  6/14 admit for melena w/ anemia, INR 8.84   6/15 Capsul study (+) for small bowel bleed, balloon endoscopy (old blood in prox ileum); post EGD - septic w/ L opacity, re-intubated for concern for aspiration, TTE (Mod MR, decrease biV w/ interventricular septum boweing towards R)  6/17 bronch   6/20 +C Diff   6/22 CT C/A/P: Fluid filled colon which may be 2/2 rapid transit. Small bilateral pleffs with associates. Compressive atelectasis New ISABELLE & LLL  parenchymal opacities, suspicious for pneumonia. Moderate stenosis in the proximal superior mesenteric artery.   6/25 #8 Shiley trach at bedside   7/1 LVAD speed increased to 9200  7/2 Bronch  7/20 TC since 7/7. Patient transferred to SDU.   7/23 INR today 2.64.  H&H 7.3/24 this AM.  Will repeat CBC at noon, and will send stool guaiac Patient with persistent abdominal tenderness, rate of tube feeds decreased.  No nausea/vomiting.    7/24 INR today 2.4. H&H 9.1/28.6 low flow overnight /N&V, refusing Tube feeds on D5 1/2 normal  @50 cc/hr  7/25 INR 2.69  H&H 7.7/25.1 refusing Tube feeds on D5 1/2 normal  @50 cc/hr. This am + BM Melena Dr Oneill HF  aware- PRBC x1  GI team consulted -  NPO  plan on study in am-  D/w Dr Cadet Patient  to return to CTU for further management; 1PRBCS   7/27 Post op INR 2.2 today.  No bleeding. BC + for SM.  Pt is hypotensive requiring pressor and inotropic support.  ID follow up today on Cefepime will follow.  7/26 R PTC for PTX   7/28 CT C/A/P: sub q emphysema in R chest wall, GGO RUL, small ascites CTH negative; Abd US: GB thickening, pericholecystic fluid    7/30 perc choley  7/31 Perchole drain in place continues to drain total output overnight 133.  Fever today 38.8   8/1 duplex LE negative   8/7 Patient with persistent abdominal pain, refusing tube feeds and medications, Psych consulted  8/13 CTA A/P ordered to r/o mesenteric ischemia 2/2 persistent anorexia, nausea, vomiting. Revealed:  Evaluation of the mesenteric vessels is limited by streak artifact from LVAD. There appears to be severe stenosis of the proximal SMA; abdominal mesenteric doppler is recommended for further evaluation. 2.  No small bowel findings to suggest acute mesenteric ischemia. 3.  Focal dissections involving the right and left common iliac arteries.  8/15: Cultures resulted BC 1/2 +GPC in clusters, SC enterobacter; mesenteric duplex: borderline stenosis of proximal SMA  8/27: CT C/A/P noncon: Nondular opacities in R lung apex w/cavitation, abd nl  8/31:  Continue current care, treatment of thyrotoxicosis with medications as per endocrine, d/c ABX as per team.   9/8 RUQ sono: Contracted gallbladder with cholecystostomy tube in place.  9/10 failed SMA stent, c/b RP bleed s/p 3U PRCB  9/12 CTA Abd/Pelvis L RP hematoma   9/24 Trach decannulated   9/26 intubated fro resp distress for increased WOB, LL pneumonia; CT C/A/P: progressive ISABELLE opacities and L consolidations, multifocal pneumonia   9/30 TTE (EF 25%, decreased RV, mod MR)   10/3 VT -> Lidocaine gtt   10/4 Trach size 6 DCT cuff  10/5 CT abd (neg for intra-abd abcess, severe stenosis of prox SMA and b/l iliac arteries)  10/18 SMA Stent   10/23 Emend for nausea   10/24 +Occult  10/29 FEES, RUQ ascites and pericholecystic fluid   Theckened liqwuids puree diet- calorie ct    Today:  Ambulation, TC, and PO diet as tolerated.  Day 2 of calorie count  Discussions yesterday as to discharge planning, agreeable to go to [facility] however will need to be decannulated and NGT removed.  Patient candidate for SDU.   11/4 Transferred to sdu.  Must be oob - chair for all meals.  Eventual change to uncuffed trach.  11/5 VSS; rsr/ st ; changed trach to #6 shiley cuffless this am by ENT- tolerated well; continue nutritional support with kaofeed; h/h stable 8/26 today   DNR rescinded today as per CHF/ LVAD team   11/6 VSS: pt tolerating supervised po diet; shantelle count in progress; trach care/suctioning q 3 hours; increase hydral 20 q8 as per CHF/LVAD team  discharge planning- rehab when stable

## 2021-11-07 NOTE — PROGRESS NOTE ADULT - PROBLEM SELECTOR PLAN 1
- ACC/AHA stage D systolic heart failure s/p LVAD   - Currently on Lopressor 12.5 mg PO BID, (MAP goal 70-80).  - continue with hydralazine 20mg PO Q8.  - Encourage ambulation, PT. - ACC/AHA stage D systolic heart failure s/p LVAD   - Currently on Lopressor 12.5 mg PO Q8, (MAP goal 70-80).  - continue with hydralazine 20mg PO Q8.  - start Spironolactone 25mg PO Q24  - Encourage ambulation, PT.

## 2021-11-07 NOTE — PROGRESS NOTE ADULT - ATTENDING COMMENTS
66/M with Stage D HF s/p HM 2 as DT (Severe PAD), abdominal pain 2/2 mysentric ischemia, admitted on 6/2021 for anemia with Hb 4.5 with supratheraputic INR 8.8, with hospital course complicated by resp failure s/p Trach ( retrach on 10/4) weaned off vent, Entero bacter bactremia, serratia/ stenotrophomonas Pneumonia, recurrent GI bleed, s/p SMA stent for abdominal pain, now DNR and Doesn't want to be back on vent in the setting of clinical worsening.     LVAD interrogation: HM 2, Speed 9200, FLow 4- 5's, PI, Power stable, no significant alarms noted.    Plan:  Stage D HF/ HM 2  MAP's > 80 's  Off  AC due to recurrent Bleeding  LDH stable   cont metoprolol 12.5mg BID, hydral 20mg tid  start spironolactone 25mg daily    GI bleeding  off AC, resumed Aspirin for SMA stent.   Cont monitor hb, if drop, will send stool occult.     Resp failure:  On trach collar, betzaida 6 --> switch to cuffless trach   cont wean as tolerated  use PMV as tolerated    Sepsis, Enterobacter bactremia, Serratia PNA  recent Resp cx with  stenotrophomonas light growth, austen colonization.  cont cipro  repeat cultures as needed     SMA stenting, off antiplatelets due to recurrent GIB,   cont aspirin given recent stenting   monitor HB. if has recurrent hb drop, will stop aspirin    Cholecystitis - achalculous, s/p perc drain placement    Hyperthyroidism  cont methimazole and steroid janeth    Hyponatremia  nehro recs appreciated  improved , cont UreaNA    Diet:  cont tube feed at goal  cont Puree diet, calorie count to assess oral intake. should be completing today, nutrition to follow tomorrow  if intake is sufficient then can remove NG tube.   aspiration precaution    OOB to chair, PT/OT  transfer to Step down  plan to reapply to [facility] when NG tube out. may need decannulation if that's a barrier for d/c.

## 2021-11-07 NOTE — PROGRESS NOTE ADULT - SUBJECTIVE AND OBJECTIVE BOX
SUBJECTIVE:    REVIEW OF SYSTEMS:  CONSTITUTIONAL: No fever, weight loss, or fatigue  CARDIOLOGY: denies chest pain, shortness of breath or syncopal episodes.   RESPIRATORY: denies shortness of breath, wheezing.   NEUROLOGICAL: NO weakness, no focal deficits to report.  ENDOCRINOLOGICAL: no recent change in diabetic medications.   GI: no BRBPR, no N,V,diarrhea.    PSYCHIATRY: normal mood and affect  HEENT: no nasal discharge, no ecchymosis  SKIN: no ecchymosis, no breakdown  MUSCULOSKELETAL: Full range of motion x4.      OBJECTIVE:    Vital Signs Last 24 Hrs  T(C): 36.3 (07 Nov 2021 08:00), Max: 36.9 (06 Nov 2021 14:45)  T(F): 97.3 (07 Nov 2021 08:00), Max: 98.4 (06 Nov 2021 14:45)  HR: 95 (07 Nov 2021 08:00) (95 - 122)  BP: 97/68 (07 Nov 2021 03:11) (97/68 - 97/68)  BP(mean): 88 (07 Nov 2021 08:00) (77 - 94)  RR: 16 (07 Nov 2021 06:37) (16 - 19)  SpO2: 100% (07 Nov 2021 08:00) (99% - 100%)    I&O's Summary    06 Nov 2021 08:01  -  07 Nov 2021 07:00  --------------------------------------------------------  IN: 1140 mL / OUT: 1225 mL / NET: -85 mL    07 Nov 2021 07:01  -  07 Nov 2021 09:46  --------------------------------------------------------  IN: 50 mL / OUT: 0 mL / NET: 50 mL        PHYSICAL EXAM:  General Appearance: well appearing, normal for age and gender. 	  Neck: normal JVP, no bruit.   Eyes: No xanthelasma, Extra ocular muscles intact.   Cardiovascular: regular rate and rhythm S1 S2, No JVD, No murmurs, No edema  Respiratory: Lungs clear to auscultation	  Psychiatry: Alert and oriented x 3, Mood & affect appropriate  Gastrointestinal:  Soft, Non-tender  Skin/Integument: No rashes, No ecchymosis, No cyanosis	  Neurologic: Non-focal  Musculoskeletal/ extremities: Normal range of motion, No clubbing, cyanosis or edema  Vascular: Peripheral pulses palpable 2+ bilaterally    MEDICATIONS  (STANDING):  aspirin  chewable 81 milliGRAM(s) Oral daily  chlorhexidine 2% Cloths 1 Application(s) Topical <User Schedule>  ciprofloxacin     Tablet 500 milliGRAM(s) Oral every 12 hours  hydrALAZINE 20 milliGRAM(s) Oral three times a day  insulin lispro (ADMELOG) corrective regimen sliding scale   SubCutaneous every 6 hours  magnesium oxide 400 milliGRAM(s) Oral every 12 hours  methimazole 10 milliGRAM(s) Oral daily  metoclopramide Injectable 10 milliGRAM(s) IV Push every 8 hours  metoprolol tartrate 12.5 milliGRAM(s) Oral every 8 hours  mirtazapine 7.5 milliGRAM(s) Oral at bedtime  multivitamin 1 Tablet(s) Oral daily  ondansetron Injectable 8 milliGRAM(s) IV Push every 8 hours  pantoprazole  Injectable 40 milliGRAM(s) IV Push every 12 hours  sertraline 100 milliGRAM(s) Oral daily  thiamine 100 milliGRAM(s) Oral daily  urea Oral Powder 15 Gram(s) Oral daily  vancomycin    Solution 125 milliGRAM(s) Oral every 12 hours    MEDICATIONS  (PRN):  guaiFENesin Oral Liquid (Sugar-Free) 200 milliGRAM(s) Oral every 4 hours PRN mucous plugging  simethicone 80 milliGRAM(s) Chew every 8 hours PRN Gas    	  TELEMETRY:    ECG:    TTE:    LABS:                        8.8    8.72  )-----------( 272      ( 07 Nov 2021 05:38 )             27.7     11-07    134<L>  |  97  |  11  ----------------------------<  103<H>  4.1   |  26  |  0.49<L>    Ca    9.6      07 Nov 2021 05:38  Phos  3.3     11-06  Mg     1.5     11-06    TPro  7.4  /  Alb  3.7  /  TBili  0.4  /  DBili  x   /  AST  31  /  ALT  42  /  AlkPhos  178<H>  11-06      BNP    RADIOLOGY & ADDITIONAL STUDIES:             SUBJECTIVE:  Pt. denies any current complaints. Denies any ches pain, sob, palpitations, orthopnea, pnd.     REVIEW OF SYSTEMS:  CONSTITUTIONAL: No fever, weight loss, or fatigue  CARDIOLOGY: denies chest pain, shortness of breath or syncopal episodes.   RESPIRATORY: denies shortness of breath, wheezing.   NEUROLOGICAL: NO weakness, no focal deficits to report.  ENDOCRINOLOGICAL: no recent change in diabetic medications.   GI: no BRBPR, no N,V,diarrhea.    PSYCHIATRY: normal mood and affect  HEENT: no nasal discharge, no ecchymosis  SKIN: no ecchymosis, no breakdown  MUSCULOSKELETAL: Full range of motion x4.      OBJECTIVE:    Vital Signs Last 24 Hrs  T(C): 36.3 (07 Nov 2021 08:00), Max: 36.9 (06 Nov 2021 14:45)  T(F): 97.3 (07 Nov 2021 08:00), Max: 98.4 (06 Nov 2021 14:45)  HR: 95 (07 Nov 2021 08:00) (95 - 122)  BP: 97/68 (07 Nov 2021 03:11) (97/68 - 97/68)  BP(mean): 88 (07 Nov 2021 08:00) (77 - 94)  RR: 16 (07 Nov 2021 06:37) (16 - 19)  SpO2: 100% (07 Nov 2021 08:00) (99% - 100%)    I&O's Summary    06 Nov 2021 08:01  -  07 Nov 2021 07:00  --------------------------------------------------------  IN: 1140 mL / OUT: 1225 mL / NET: -85 mL    07 Nov 2021 07:01  -  07 Nov 2021 09:46  --------------------------------------------------------  IN: 50 mL / OUT: 0 mL / NET: 50 mL        PHYSICAL EXAM:  General Appearance: well appearing, normal for age and gender. 	  Neck: normal JVP, no bruit.   Eyes: No xanthelasma, Extra ocular muscles intact.   Cardiovascular: regular rate and rhythm S1 S2, No JVD, No murmurs, No edema  Respiratory: Lungs clear to auscultation	  Psychiatry: Alert and oriented x 3, Mood & affect appropriate  Gastrointestinal:  Soft, Non-tender  Skin/Integument: No rashes, No ecchymosis, No cyanosis	  Neurologic: Non-focal  Musculoskeletal/ extremities: Normal range of motion, No clubbing, cyanosis or edema  Vascular: Peripheral pulses palpable 2+ bilaterally    MEDICATIONS  (STANDING):  aspirin  chewable 81 milliGRAM(s) Oral daily  chlorhexidine 2% Cloths 1 Application(s) Topical <User Schedule>  ciprofloxacin     Tablet 500 milliGRAM(s) Oral every 12 hours  hydrALAZINE 20 milliGRAM(s) Oral three times a day  insulin lispro (ADMELOG) corrective regimen sliding scale   SubCutaneous every 6 hours  magnesium oxide 400 milliGRAM(s) Oral every 12 hours  methimazole 10 milliGRAM(s) Oral daily  metoclopramide Injectable 10 milliGRAM(s) IV Push every 8 hours  metoprolol tartrate 12.5 milliGRAM(s) Oral every 8 hours  mirtazapine 7.5 milliGRAM(s) Oral at bedtime  multivitamin 1 Tablet(s) Oral daily  ondansetron Injectable 8 milliGRAM(s) IV Push every 8 hours  pantoprazole  Injectable 40 milliGRAM(s) IV Push every 12 hours  sertraline 100 milliGRAM(s) Oral daily  thiamine 100 milliGRAM(s) Oral daily  urea Oral Powder 15 Gram(s) Oral daily  vancomycin    Solution 125 milliGRAM(s) Oral every 12 hours    MEDICATIONS  (PRN):  guaiFENesin Oral Liquid (Sugar-Free) 200 milliGRAM(s) Oral every 4 hours PRN mucous plugging  simethicone 80 milliGRAM(s) Chew every 8 hours PRN Gas    	  TELEMETRY:    ECG:    TTE:    LABS:                        8.8    8.72  )-----------( 272      ( 07 Nov 2021 05:38 )             27.7     11-07    134<L>  |  97  |  11  ----------------------------<  103<H>  4.1   |  26  |  0.49<L>    Ca    9.6      07 Nov 2021 05:38  Phos  3.3     11-06  Mg     1.5     11-06    TPro  7.4  /  Alb  3.7  /  TBili  0.4  /  DBili  x   /  AST  31  /  ALT  42  /  AlkPhos  178<H>  11-06      BNP    RADIOLOGY & ADDITIONAL STUDIES:

## 2021-11-07 NOTE — PROGRESS NOTE ADULT - SUBJECTIVE AND OBJECTIVE BOX
VITAL SIGNS    Telemetry:     sr   st 100    Vital Signs Last 24 Hrs  T(C): 36.3 (21 @ 08:00), Max: 36.9 (21 @ 14:45)  T(F): 97.3 (21 @ 08:00), Max: 98.4 (21 @ 14:45)  HR: 109 (21 @ 12:00) (95 - 122)  BP: 97/68 (21 @ 03:11) (97/68 - 97/68)  RR: 16 (21 @ 06:37) (16 - 18)  SpO2: 100% (21 @ 12:00) (99% - 100%)                   Daily     Daily Weight in k.5 (2021 06:42)        CAPILLARY BLOOD GLUCOSE      POCT Blood Glucose.: 160 mg/dL (2021 11:34)  POCT Blood Glucose.: 129 mg/dL (2021 06:36)  POCT Blood Glucose.: 131 mg/dL (2021 00:07)  POCT Blood Glucose.: 118 mg/dL (2021 17:55)  POCT Blood Glucose.: 163 mg/dL (2021 12:33)                            PHYSICAL EXAM  s"       Neurology: alert and oriented x 3, moves all extremities with no defecits  CV :   Sternal Wound :  CDI , Stable   healed  Lungs:   CTA B/L  Abdomen: soft, nontender, nondistended, positive bowel sounds, last bowel movement   Extremities:                                            VITAL SIGNS    Telemetry:     sr   st 100    Vital Signs Last 24 Hrs  T(C): 36.3 (21 @ 08:00), Max: 36.9 (21 @ 14:45)  T(F): 97.3 (21 @ 08:00), Max: 98.4 (21 @ 14:45)  HR: 109 (21 @ 12:00) (95 - 122)  BP: 97/68 (21 @ 03:11) (97/68 - 97/68)  RR: 16 (21 @ 06:37) (16 - 18)  SpO2: 100% (21 @ 12:00) (99% - 100%)                   Daily     Daily Weight in k.5 (2021 06:42)        CAPILLARY BLOOD GLUCOSE      POCT Blood Glucose.: 160 mg/dL (2021 11:34)  POCT Blood Glucose.: 129 mg/dL (2021 06:36)  POCT Blood Glucose.: 131 mg/dL (2021 00:07)  POCT Blood Glucose.: 118 mg/dL (2021 17:55)  POCT Blood Glucose.: 163 mg/dL (2021 12:33)                            PHYSICAL EXAM  s"  No   sob  no chest pain     Neurology: alert and oriented x 3, moves all extremities with no defecits  CV : LVAD sounds  Sternal Wound :  CDI , Stable   healed   rt side dawn drain  Lungs:   CTA B/L  Abdomen: soft, nontender, nondistended, positive bowel sounds,   Extremities:     L   axillary staples no dressing     + LAVAD

## 2021-11-07 NOTE — PROGRESS NOTE ADULT - ASSESSMENT
Assessment and Recommendation:   · Assessment	  Assessment and recommendation :  Recurrent Acute hypoxic respiratory Failure  FI02 is 40% S/P tracheostomy  on track. collar   Acute blood loss anemia S/P multiple  blood transfusion post tracheostomy   start Decannulation to cuffless trach.  recurrent  septic shock  weaned off  vasopressin   sepsis   on Cipro tablets   po vancomycin   C-Dificle colitis on po vancomycin   S/P cholecystostomy tube placement by IR    to repeat Abdominal ultra sound  patient is S/P  repeat vacular procedure and SMA stent   AF RVR back to regular sinus Rhythm   Non ischemic cardiomyopathy continue ACE inhibitor and B-Blockers   Stage D systolic heart failure S/P LVAD HM2   MH2 LVAD  with  TV Annuloplasty  Severe peripheral vascular disease   severe hyperglycemia on insulin coverage    Reglan 10 mg for Gastric Motility   hyperthyroidism on Methimazole 10mg TID   critical care polyneuropathy   Anemia of Acute blood Loss   severe protein caloric malnutrition   magnesium supplement keep above 2   Chronic kidney disease stage III  Depressed and withdrawn   on  NG tube feeding and PO trial  start decannulate   GI prophylaxis with PPI

## 2021-11-08 NOTE — PROGRESS NOTE ADULT - PROBLEM SELECTOR PLAN 2
-pt successfully titrated off of his steroids.  -discussed with ELVIE TAYLOR and RN.  Sally Beatty MD  Endocrinology Attending  Mondays and Tuesdays 9am-6pm: [phone] (pager)  Other days, night and weekend: [phone]

## 2021-11-08 NOTE — PROGRESS NOTE ADULT - ASSESSMENT
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 coli UTIs, driveline drainage (1/7/21) and COVID-19 (back in April 2020)  He was recently seen in clinic where he complained of abdominal pain and dark stools w constipation back in May. He presents to [facility] ER today weakness and fatigue, moderate and + Black stools for three days, on coumadin secondary to warfarin use in the setting of an LVAD. Patient has required transfusions for GIB in the past. Mostly recently back in jan 2021 pt had anemia with dark stools. No interventions was done at that time. However Last Endoscopy was done in July 2020 (negative). Today labs show patient is anemic with H/H of 4.5/16.3,. INR is 8.84 MAP in the 90s, Temp 35.1. He denies any chest pain, shortness of breath, dizziness, abd pain, nausea or vomiting.       (14 Jun 2021 16:57)  Surgery/Hospital Course:  6/14 admit for melena w/ anemia, INR 8.84   6/15 Capsul study (+) for small bowel bleed, balloon endoscopy (old blood in prox ileum); post EGD - septic w/ L opacity, re-intubated for concern for aspiration, TTE (Mod MR, decrease biV w/ interventricular septum boweing towards R)  6/17 bronch   6/20 +C Diff   6/22 CT C/A/P: Fluid filled colon which may be 2/2 rapid transit. Small bilateral pleffs with associates. Compressive atelectasis New ISABELLE & LLL  parenchymal opacities, suspicious for pneumonia. Moderate stenosis in the proximal superior mesenteric artery.   6/25 #8 Shiley trach at bedside   7/1 LVAD speed increased to 9200  7/2 Bronch  7/20 TC since 7/7. Patient transferred to SDU.   7/23 INR today 2.64.  H&H 7.3/24 this AM.  Will repeat CBC at noon, and will send stool guaiac Patient with persistent abdominal tenderness, rate of tube feeds decreased.  No nausea/vomiting.    7/24 INR today 2.4. H&H 9.1/28.6 low flow overnight /N&V, refusing Tube feeds on D5 1/2 normal  @50 cc/hr  7/25 INR 2.69  H&H 7.7/25.1 refusing Tube feeds on D5 1/2 normal  @50 cc/hr. This am + BM Melena Dr Oneill HF  aware- PRBC x1  GI team consulted -  NPO  plan on study in am-  D/w Dr Cadet Patient  to return to CTU for further management; 1PRBCS   7/27 Post op INR 2.2 today.  No bleeding. BC + for SM.  Pt is hypotensive requiring pressor and inotropic support.  ID follow up today on Cefepime will follow.  7/26 R PTC for PTX   7/28 CT C/A/P: sub q emphysema in R chest wall, GGO RUL, small ascites CTH negative; Abd US: GB thickening, pericholecystic fluid    7/30 perc choley  7/31 Perchole drain in place continues to drain total output overnight 133.  Fever today 38.8   8/1 duplex LE negative   8/7 Patient with persistent abdominal pain, refusing tube feeds and medications, Psych consulted  8/13 CTA A/P ordered to r/o mesenteric ischemia 2/2 persistent anorexia, nausea, vomiting. Revealed:  Evaluation of the mesenteric vessels is limited by streak artifact from LVAD. There appears to be severe stenosis of the proximal SMA; abdominal mesenteric doppler is recommended for further evaluation. 2.  No small bowel findings to suggest acute mesenteric ischemia. 3.  Focal dissections involving the right and left common iliac arteries.  8/15: Cultures resulted BC 1/2 +GPC in clusters, SC enterobacter; mesenteric duplex: borderline stenosis of proximal SMA  8/27: CT C/A/P noncon: Nondular opacities in R lung apex w/cavitation, abd nl  8/31:  Continue current care, treatment of thyrotoxicosis with medications as per endocrine, d/c ABX as per team.   9/8 RUQ sono: Contracted gallbladder with cholecystostomy tube in place.  9/10 failed SMA stent, c/b RP bleed s/p 3U PRCB  9/12 CTA Abd/Pelvis L RP hematoma   9/24 Trach decannulated   9/26 intubated fro resp distress for increased WOB, LL pneumonia; CT C/A/P: progressive ISABELLE opacities and L consolidations, multifocal pneumonia   9/30 TTE (EF 25%, decreased RV, mod MR)   10/3 VT -> Lidocaine gtt   10/4 Trach size 6 DCT cuff  10/5 CT abd (neg for intra-abd abcess, severe stenosis of prox SMA and b/l iliac arteries)  10/18 SMA Stent   10/23 Emend for nausea   10/24 +Occult  10/29 FEES, RUQ ascites and pericholecystic fluid   Theckened liqwuids puree diet- calorie ct    Today:  Ambulation, TC, and PO diet as tolerated.  Day 2 of calorie count  Discussions yesterday as to discharge planning, agreeable to go to [facility] however will need to be decannulated and NGT removed.  Patient candidate for SDU.   11/4 Transferred to sdu.  Must be oob - chair for all meals.  Eventual change to uncuffed trach.  11/5 VSS; rsr/ st ; changed trach to #6 shiley cuffless this am by ENT- tolerated well; continue nutritional support with kaofeed; h/h stable 8/26 today   DNR rescinded today as per CHF/ LVAD team   11/6 VSS: pt tolerating supervised po diet; shantelle count in progress; trach care/suctioning q 3 hours; increase hydral 20 q8 as per CHF/LVAD team  11/7   OOB to chair,   vss   keofvenkat w/ shantelle count    dawn drain    ald 25 qd  added by HF,  no LVAD alarms  11/8 sluggish today, refusing meds.    Plan to hold TF and increase PO intake. If adequate intake plan to d/c ngt

## 2021-11-08 NOTE — PROGRESS NOTE ADULT - PROBLEM SELECTOR PLAN 1
- ACC/AHA stage D systolic heart failure s/p LVAD   - Currently on Lopressor 12.5 mg PO Q8, (MAP goal 70-80).  - continue with hydralazine 20mg PO Q8.  - continue with Spironolactone 25mg PO Q24  - Encourage ambulation, PT.

## 2021-11-08 NOTE — PROGRESS NOTE ADULT - ASSESSMENT
67 YO M with a history of stage D NICM s/p HM2 on 9/2017 as DT (due to severe PAD) with TV ring, prior COVID-19 infection 4/2020, recurrent syncope post LVAD s/p ILR, and chronic abdominal pain with prior negative workup who was admitted 6/14/21 with symptomatic anemia with Hgb 4.5 in setting of INR 8.8 without hemodynamic instability and has since had a protracted hospitalization. He was transfused and underwent VCE which showed active bleeding in the mid small bowel but subsequent enteroscopy 6/15 did not reveal any active bleeding. He acutely decompensated after procedure with fever/hypertension, low flow alarms, and pulmonary infiltrate with hypoxia requiring intubation from probable aspiration PNA. He was unable to extubated and has since undergone tracheostomy but tolerating persistent trach collar and nearing ability for decannulation. 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 He underwent angiogram on 9/10, stent was unable to be placed and course was then complicated by RP bleed. He continued to have persistent leukocytosis and febrile episodes and was noted to have positive sputum culture for serratia marcescens and completed his course of abx. He was initially decannulated on 9/23. Recently he started having multiples of melena, emesis and went into acte respiratory distress and was ultimately re-intubated on 9/26.     He had blood cultures positive for enterobacter cloacae/serratia and sputum positive for stenotrophomonas remains on IV antibiotics. He is s/p trach with ENT on 10/4. He underwent SMA stent x 2 on 10/18. His nausea and vomiting has improved, and he is now tolerating PO diet along with tube feeds. Recently he rescinded his DNR/DNI. Will continue to advance diet as tolerated, with goals to remove NG tube and decannulate. Goal will be discharged to [facility] when stable.

## 2021-11-08 NOTE — PROGRESS NOTE ADULT - ASSESSMENT
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 coli UTIs, driveline drainage (1/7/21) and COVID-19, here initially for GIB prolonged hospital course, s/p tracheostomy, acalculous cholecystitis s/p perc dawn. Patient has persistent intermittent abdominal pain, and was being evaluated for chronic mesenteric ischemia vs SMA syndrome.  8/13 bld cxs positive. TPN was consulted for Protein Calorie Malnutrition, Prealb 11, prior a1c 5.6, tg 141. Passed FEES and started on dysphagia diet as supplement to ngt feeds, tolerating. Transferred out of CTU to [facility]    Current diet:  ngt feeds vital 1.5 (goal 50cc/hr) + pureed with mildly thick liquids     -Cont tube feeds + PO dysphagia diet (as per speech recs) as tolerated; kcal count in progress  -Abdominal pain/vomiting- multifactorial, improved  -Hyperthyroidism- care per endo  -Electrolyte imbalance risk- monitor and replete elytes as needed  -Management as per primary; will remain available as needed    plan discussed with Dr ANNE Tang, agreeable with above    TPN pager [phone], spectra [phone]  M-F 6A-4P, Weekends and holidays 8A-12P

## 2021-11-08 NOTE — PROGRESS NOTE ADULT - PROBLEM SELECTOR PLAN 4
- s/p trach on 10/4, currently tolerating trach collar.   - changed trach to #6 shiley cuffless by ENT 11/6.

## 2021-11-08 NOTE — PROGRESS NOTE ADULT - PROBLEM SELECTOR PLAN 1
HF follow up   added ald 25 qd  mAINTAIN CURRENT SPEED.  asa 81 qd   continue lopressor 12.5 mg po bid   rt upper extremity  midline for access   hydral 20 q8 as per CHF/LVAD team   rehab when stable   consisder removing  keofeed   monday  if shantelle count sufficient

## 2021-11-08 NOTE — PROGRESS NOTE ADULT - SUBJECTIVE AND OBJECTIVE BOX
Subjective: Patient seen and examined resting in bed.     Medications:  aspirin  chewable 81 milliGRAM(s) Oral daily  chlorhexidine 2% Cloths 1 Application(s) Topical <User Schedule>  ciprofloxacin     Tablet 500 milliGRAM(s) Oral every 12 hours  guaiFENesin Oral Liquid (Sugar-Free) 200 milliGRAM(s) Oral every 4 hours PRN  hydrALAZINE 20 milliGRAM(s) Oral three times a day  insulin lispro (ADMELOG) corrective regimen sliding scale   SubCutaneous every 6 hours  magnesium oxide 400 milliGRAM(s) Oral every 12 hours  methimazole 10 milliGRAM(s) Oral daily  metoclopramide Injectable 10 milliGRAM(s) IV Push every 8 hours  metoprolol tartrate 12.5 milliGRAM(s) Oral every 8 hours  mirtazapine 7.5 milliGRAM(s) Oral at bedtime  multivitamin 1 Tablet(s) Oral daily  ondansetron Injectable 8 milliGRAM(s) IV Push every 8 hours  pantoprazole  Injectable 40 milliGRAM(s) IV Push every 12 hours  sertraline 100 milliGRAM(s) Oral daily  simethicone 80 milliGRAM(s) Chew every 8 hours PRN  spironolactone 25 milliGRAM(s) Oral daily  thiamine 100 milliGRAM(s) Oral daily  urea Oral Powder 15 Gram(s) Oral daily  vancomycin    Solution 125 milliGRAM(s) Oral every 12 hours    Vitals:  Vital Signs Last 24 Hours  T(C): 36.6 (21 @ 23:46), Max: 36.6 (21 @ 16:00)  HR: 106 (21 @ 08:00) (100 - 111)  BP: --  RR: 16 (21 @ 08:00) (16 - 20)  SpO2: 100% (21 @ 08:00) (100% - 100%)    Weight in k.6 ( @ 05:47)    I&O's Summary    2021 07:01  -  2021 07:00  --------------------------------------------------------  IN: 1630 mL / OUT: 1475 mL / NET: 155 mL    2021 07:01  -  2021 08:53  --------------------------------------------------------  IN: 320 mL / OUT: 220 mL / NET: 100 mL      Tele: SR/ST     Physical Exam  General: No distress. Comfortable.  HEENT: EOM intact.  Neck: +trach collar   Chest: Clear to auscultation bilaterally  CV: +VAD hum  Abdomen: Soft, non-distended, non-tender, +keotube in place, +bilary drain   Neurology: Alert and oriented times three.    LVAD Interrogation  VAD TYPE HM 2  Speed 9200  Flow 5.2  Power 5.5  PI 6.6  Assessment of driveline exit site: driveline dressing c/d/i  Programming changes: no changes made    Labs:                        9.3    7.99  )-----------( 281      ( 2021 04:34 )             29.5     11-    134<L>  |  97  |  16  ----------------------------<  117<H>  4.2   |  25  |  0.48<L>    Ca    9.3      2021 04:34  Phos  3.4       Mg     1.5         TPro  7.5  /  Alb  3.8  /  TBili  0.4  /  DBili  x   /  AST  23  /  ALT  32  /  AlkPhos  160<H>  11-08              Lactate Dehydrogenase, Serum: 250 U/L ( @ 04:34)  Lactate Dehydrogenase, Serum: 259 U/L ( @ 07:01)

## 2021-11-08 NOTE — PROGRESS NOTE ADULT - SUBJECTIVE AND OBJECTIVE BOX
[facility] NUTRITION SUPPORT-- FOLLOW UP NOTE  -------------------------------------------------------------------------------    24 hour events/subjective: pt seen and examined. transferred out of ctu since last eval. tolerating ngt feeds at goal. tolerating dysphagia diet- kcal count sheet reviewed. pt denies n/v/abd pain. bm yesterday    Diet:  Diet, Pureed:   Mildly Thick Liquids (MILDTHICKLIQS)  Tube Feeding Modality: Nasogastric  Vital 1.5 Tank (VITAL1.5RTH)  Total Volume for 24 Hours (mL): 1200  Continuous  Starting Tube Feed Rate mL per Hour: 50  Until Goal Tube Feed Rate (mL per Hour): 50  Tube Feed Duration (in Hours): 24  Tube Feed Start Time: 15:40  Supplement Feeding Modality:  Oral  Ensure Enlive Cans or Servings Per Day:  3       Frequency:  Daily (21 @ 09:29)      PAST HISTORY  --------------------------------------------------------------------------------  No significant changes to PMH, PSH, FHx, SHx, unless otherwise noted    ALLERGIES & MEDICATIONS  --------------------------------------------------------------------------------  Allergies    Amiodarone Hydrochloride (Other (Severe))    Intolerances      Standing Inpatient Medications  aspirin  chewable 81 milliGRAM(s) Oral daily  chlorhexidine 2% Cloths 1 Application(s) Topical <User Schedule>  ciprofloxacin     Tablet 500 milliGRAM(s) Oral every 12 hours  hydrALAZINE 20 milliGRAM(s) Oral three times a day  insulin lispro (ADMELOG) corrective regimen sliding scale   SubCutaneous every 6 hours  magnesium oxide 400 milliGRAM(s) Oral every 12 hours  methimazole 10 milliGRAM(s) Oral daily  metoclopramide Injectable 10 milliGRAM(s) IV Push every 8 hours  metoprolol tartrate 12.5 milliGRAM(s) Oral every 8 hours  mirtazapine 7.5 milliGRAM(s) Oral at bedtime  multivitamin 1 Tablet(s) Oral daily  ondansetron Injectable 8 milliGRAM(s) IV Push every 8 hours  pantoprazole  Injectable 40 milliGRAM(s) IV Push every 12 hours  sertraline 100 milliGRAM(s) Oral daily  spironolactone 25 milliGRAM(s) Oral daily  thiamine 100 milliGRAM(s) Oral daily  urea Oral Powder 15 Gram(s) Oral daily  vancomycin    Solution 125 milliGRAM(s) Oral every 12 hours    PRN Inpatient Medications  guaiFENesin Oral Liquid (Sugar-Free) 200 milliGRAM(s) Oral every 4 hours PRN  simethicone 80 milliGRAM(s) Chew every 8 hours PRN        REVIEW OF SYSTEMS  --------------------------------------------------------------------------------  General:  as per hpi  HEENT:  no headaches, no vision changes, no ear pain  CV:  no chest pain, no palpitations  Resp:  no dyspnea  GI: as per hpi  :  no pain, no bleeding, no dysuria  Muscle:  no pain  Neuro:  no numbness, no tingling, no memory problems  Psych:  no insomnia  Endocrine:  no cold/heat intolerance  Heme: no ecchymosis, no easy bruising  Skin:  no rash, no edema      VITALS/PHYSICAL EXAM  --------------------------------------------------------------------------------  T(C): 36.6 (21 @ 23:46), Max: 36.6 (21 @ 16:00)  HR: 104 (21 @ 06:29) (95 - 111)  BP: --  RR: 19 (21 @ 06:29) (19 - 20)  SpO2: 100% (21 @ 06:29) (100% - 100%)  Wt(kg): --      21 @ 07:01  -  21 @ 07:00  --------------------------------------------------------  IN: 1630 mL / OUT: 1475 mL / NET: 155 mL      I&O's Detail    2021 07:01  -  2021 07:00  --------------------------------------------------------  IN:    Enteral Tube Flush: 100 mL    Miscellaneous Tube Feedin mL    Oral Fluid: 580 mL  Total IN: 1630 mL    OUT:    Drain (mL): 400 mL    Voided (mL): 1075 mL  Total OUT: 1475 mL    Total NET: 155 mL      Physical Exam:  Gen: lying in bed in nad, frail +ngt  HEENT: +trach  Chest: respirations non labored  Abd: soft  nondistended +perc c tube  LE:  no edema  Neuro: awake alert appropriate      LABS/STUDIES  --------------------------------------------------------------------------------              9.3    7.99  >-----------<  281      [21 @ 04:34]              29.5     134  |  97  |  16  ----------------------------<  117      [21 @ 04:34]  4.2   |  25  |  0.48        Ca     9.3     [21 @ 04:34]      Mg     1.5     [21 @ 04:34]      Phos  3.4     [21 @ 04:34]    TPro  7.5  /  Alb  3.8  /  TBili  0.4  /  DBili  x   /  AST  23  /  ALT  32  /  AlkPhos  160  [21 @ 04:34]              [21 @ 04:34]    Ca ionized  Creatinine Trend:  POC glucoseGlucose, Serum: 117 mg/dL (21 @ 04:34)  CAPILLARY BLOOD GLUCOSE      POCT Blood Glucose.: 139 mg/dL (2021 05:36)  POCT Blood Glucose.: 106 mg/dL (2021 23:34)  POCT Blood Glucose.: 107 mg/dL (2021 21:50)  POCT Blood Glucose.: 122 mg/dL (2021 17:19)  POCT Blood Glucose.: 160 mg/dL (2021 11:34)    Prealbumin  Triglycerides

## 2021-11-08 NOTE — PROGRESS NOTE ADULT - SUBJECTIVE AND OBJECTIVE BOX
RICKY JOINT  MRN#: [id number]  Subjective:  Pulmonary progress  : recurrent Acute hypoxic respiratory Failure ,aspiration pneumonia, NICM  ,   65M PMH ACC/AHA stage D HF due to NICM HM2 LVAD , TV annuloplasty ring 17 as destination therapy due to severe peripheral artery disease with significant stenosis  SIADH, Depression, CKD-3 with hyperkalemia, past E. coli UTIs, driveline drainage (21) and COVID-19 (back in 2020)  He was recently seen in clinic where he complained of abdominal pain and dark stools w constipation back in May. He presents to [facility] ER today weakness and fatigue, moderate and + Black stools for three days, on coumadin secondary to warfarin use in the setting of an LVAD. Patient has required transfusions for GIB in the past. Mostly recently back in 2021 pt had anemia with dark stools. No interventions was done at that time. However Last Endoscopy was done in 2020 (negative). Today labs show patient is anemic with H/H of 4.5/16.3,. INR is 8.84 MAP in the 90s, Temp 35.1. He denies any chest pain, shortness of breath, dizziness, abd pain, nausea or vomiting. found to have  rectal bleeding underwent endoscopy ,old blood in the proximal ileum ,  develop sepsis with LL opacity given Antibiotics , Extubated , reintubated , Bronchoscopy on Zosyn for LL pneumonia  and Amiodarone S/P TV Annuloplasty , patient remain intubated on full ventilatory support .S/P multiple units of blood transfusion , remain on full ventilatory support on Precedex and propofol , new central IJ line , diarrhea C diff. +ve on po vancomycin and IV Flagyl,  mildly distended belly , fever start on cefepime 2gm q 8 hrs S/P tracheostomy .  new RT Subclavian central line continue on contact  isolation ,S/P  C-diff antibiotics, no more diarrhea back on full support mechanical ventilator , chest x ray show improvement in LLL air space disease, more awake and responsive on tube feeding no more diarrhea ,  no nausea or vomiting or diarrhea still very weak and tired , back on tube feeding ,still on po vancomycin , getting PT and OT at bed side ,   , no SOB getting stronger , improve muscle tone patient transfer to monitor bed still on contact isolation for C-Difficel colitis on 50% FI02, and change to Suresh  tube feeding still loose stool . H/H drop significantly require blood transfusion , most likely GI bleeding , IV heparin D/C ,  H/H is stable ., patient develop TR sided  pneumothorax require chest tube placement , RT IJ central line  placed , develop fever shaking chills , blood culture positive for serratia marcescens , start on cefepime .the patient  become hypoxic and hypotensive placed on full ventilatory support and Vasopressin , levophed and Dobutamine ,S/P blood transfusion on meropenem and vancomycin ,   , on and  off pressors , occasional agitation on Precedex .S/P IR cholecystostomy tube drainage placement in the RT upper Quadrant , resume anticoagulation chest x ray noted C-PAP trail lasted only for 2 hrs , new RT SC line and D/C RT IJ line , RT pig tail cathter has been removed , tolerating C-PAP trial placed on trach. collar 50% FI02 GI consultant noted on NG tube feeding as tolerated , develop AF RVR S/P  bolus Amiodarone  back to regular sinus Rhythm , Flat Affect depressed , back on tube feeding Vital AF at 60 cc/ hr .still intermittent abdominal pain , no fever saturation is accepted  back on full ventilatory support ,   on methimazole for hyperthyroidism ,  condition is the same ,still C/O Abdominal pain , white count is improving , no chest pain or SOB ,  .placed on Reglan 10 mg TID for gastric motility , depressed , withdrawn. , S/P  mesenteric angiogram , unable to stent SMA S/P 3 units of blood transfusion   RT IJ in place reassessed for stent in the SMA by vascular,  dark stool stable H/H ,surgical opinion for possible Lap cholecystectomy. over night events noted develop respiratory distress, , rectal bleeding, require intubation placed on full ventilatory support , FI02 is 50% also became hemianosmically unstable require triple pressor support with levophed , vasopressin and norsynephrine, pan culture placed  on Vancomycin IV and PO  and Zosyn, sedated with propofol kept NPO , new central line RT and left IJ cathter placed . Diflucan Added .fever of 38.5 weaned off  vasopressin ,   fever adding IV vancomycin and  vancomycin solution  , and Bactrim , S/P  tracheostomy , bleeding receiving blood transfusion on vasopressin , no more bleeding , no fever , more awake and responsive ,tolerating tube feeding , ID and heart failure follow up appreciated , depresses no weaning for now , magnesium is low to give supplement too keep Above 2 , patient S/P  vascular procedure for superior mesenteric artery occlusion S/P 2 stent placement , patient tolerate it very well  , left upper extremities hematoma .vascular follow up appreciated , increase WBC , new RT UPE midline , black tarry stool , very loose R/O recurrent GI bleeding and C-dificile  colitis stable H/H no events over night , diarrhea is improved ,  WBC is improving  , hyponatremia no change , S/P FEEST study result is pending , PO feeding with observation . on Cipro antibiotics  no major over night events , worsening LFTs for repeat Abdominal sonogram pending , still diarrhea .but less , trial of po feeding , on monitor bed .start decannulation to cuffless trach . on 40% FI02, po feeding no events of aspiration .consider capping the trach. no fever minimal secretion , +BM , mild thick withe secretion , good saturation      (2021 16:57)    PAST MEDICAL & SURGICAL HISTORY:  CHF (congestive heart failure)    CAD (coronary artery disease)  Depression    Pleural effusion    History of 2019 novel coronavirus disease (COVID-19)  2020    Hemorrhoids    Bleeding hemorrhoids    Peripheral arterial disease    Claudication    BPH with urinary obstruction    ACC/AHA stage D systolic heart failure  Anticoagulation goal of INR 2.0 to 2.5    Falls    Clavicle fracture    CKD (chronic kidney disease), stage III    Iron deficiency anemia    H/O epistaxis    Vertigo    GI bleed    S/P TVR (tricuspid valve repair)    S/P ventricular assist device    S/P endoscopy    OBJECTIVE:  ICU Vital Signs Last 24 Hrs  T(C): 38.2 (2021 10:00), Max: 38.5 (2021 12:00)  T(F): 100.8 (2021 10:00), Max: 101.3 (2021 12:00)  HR: 65 (2021 10:00) (61 - 69)  BP: --  BP(mean): --  ABP: 105/67 (2021 10:00) (90/54 - 113/64)  ABP(mean): 77 (2021 10:00) (63 - 77)  RR: 20 (2021 10:00) (19 - 35)  SpO2: 99% (2021 10:00) (96% - 100%)       @ 07: @ 07:00  --------------------------------------------------------  IN: 2693.9 mL / OUT: 1415 mL / NET: 1278.9 mL     @ 07: @ 10:49  --------------------------------------------------------  IN: 420.8 mL / OUT: 115 mL / NET: 305.8 mL  PHYSICAL EXAM: Daily   Elderly male  on trach. collar  FI02 is 40% S/P tracheostomy   Daily Weight in k.4 (2021 00:00)  HEENT:     + NCAT  + EOMI  - Conjuctival edema   - Icterus   - Thrush   - ETT  + NGT/OGT  Neck:         + FROM  + JVD  - Nodes - Masses + Mid-line trachea + Tracheostomy  Chest:            normal A-P diameter    Lungs:          + CTA   + Rhonchi    - Rales    - Wheezing + Decreased  LT BS   - Dullness R L  Cardiac:       + S1 + S2    + RRR   - Irregular   - S3  - S4    - Murmurs   - Rub   - Hamman’s sign   Abdomen:    + BS  + Soft + Non-tender - Distended - Organomegaly - PEG .cholecystostomy tube in place  Extremities:   - Cyanosis U/L   - Clubbing  U/L  + LE/UE Edema LUE hematoma   + Capillary refill    + Pulses   Neuro:        - Awake   -  Alert   - Confused   - Lethargic   + Sedated  + Generalized Weakness  Skin:        - Rashes    - Erythema   + Normal incisions   + IV sites intact          HOSPITAL MEDICATIONS: All medications reviewed and analyzed  MEDICATIONS  (STANDING):  amiodarone    Tablet 200 milliGRAM(s) Oral daily  chlorhexidine 0.12% Liquid 15 milliLiter(s) Oral Mucosa every 12 hours  chlorhexidine 2% Cloths 1 Application(s) Topical <User Schedule>  dexmedetomidine Infusion 0.5 MICROgram(s)/kG/Hr (9.81 mL/Hr) IV Continuous <Continuous>  dextrose 50% Injectable 50 milliLiter(s) IV Push every 15 minutes  heparin  Infusion 400 Unit(s)/Hr (12.5 mL/Hr) IV Continuous <Continuous>  Hydromorphone  Injectable 0.5 milliGRAM(s) IV Push once  insulin lispro (ADMELOG) corrective regimen sliding scale   SubCutaneous every 6 hours  pantoprazole  Injectable 40 milliGRAM(s) IV Push every 12 hours  piperacillin/tazobactam IVPB.. 3.375 Gram(s) IV Intermittent every 8 hours  propofol Infusion 20 MICROgram(s)/kG/Min (9.42 mL/Hr) IV Continuous <Continuous>  sodium chloride 0.9% lock flush 3 milliLiter(s) IV Push every 8 hours  sodium chloride 0.9%. 1000 milliLiter(s) (10 mL/Hr) IV Continuous <Continuous>    MEDICATIONS  (PRN):  acetaminophen    Suspension .. 650 milliGRAM(s) Oral every 6 hours PRN Temp greater or equal to 38C (100.4F)    LABS: All Lab data reviewed and analyzed                         9.3    7.99  )-----------( 281      ( 2021 04:34 )             29.5      -    134<L>  |  97  |  16  ----------------------------<  117<H>  4.2   |  25  |  0.48<L>    Ca    9.3      2021 04:34  Phos  3.4     11-08  Mg     1.5     -    TPro  7.5  /  Alb  3.8  /  TBili  0.4  /  DBili  x   /  AST  23  /  ALT  32  /  AlkPhos  160<H>        Ca    9.6      2021 05:38  Phos  3.3     11-06  Mg     1.5     11-    TPro  7.4  /  Alb  3.7  /  TBili  0.4  /  DBili  x   /  AST  31  /  ALT  42  /  AlkPhos  178<H>                                                                                                                                                                                               PTT - ( 2021 04:52 )  PTT:45.2 sec LIVER FUNCTIONS - ( 2021 00:42 )  Alb: 3.4 g/dL / Pro: 6.7 g/dL / ALK PHOS: 213 U/L / ALT: 15 U/L / AST: 24 U/L / GGT: x           RADIOLOGY: - Reviewed and analyzed  , LVAD HM2, CT scan of abdomen reviewed result noted

## 2021-11-08 NOTE — PROGRESS NOTE ADULT - SUBJECTIVE AND OBJECTIVE BOX
VITAL SIGNS    Telemetry:  st to 120    Vital Signs Last 24 Hrs  T(C): 36.6 (21 @ 23:46), Max: 36.6 (21 @ 16:00)  T(F): 97.9 (21 @ 23:46), Max: 97.9 (21 @ 16:00)  HR: 113 (21 @ 11:00) (100 - 113)  BP: --78 map  RR: 20 (21 @ 11:53) (16 - 20)  SpO2: 100% (21 @ 11:53) (100% - 100%)                    @ 07:01  -   @ 07:00  --------------------------------------------------------  IN: 1630 mL / OUT: 1475 mL / NET: 155 mL     @ 07:01  -   @ 13:27  --------------------------------------------------------  IN: 320 mL / OUT: 600 mL / NET: -280 mL          Daily     Daily Weight in k.6 (2021 05:47)            CAPILLARY BLOOD GLUCOSE      POCT Blood Glucose.: 163 mg/dL (2021 11:52)  POCT Blood Glucose.: 139 mg/dL (2021 05:36)  POCT Blood Glucose.: 106 mg/dL (2021 23:34)  POCT Blood Glucose.: 107 mg/dL (2021 21:50)  POCT Blood Glucose.: 122 mg/dL (2021 17:19)                    Coumadin    [ ] YES          [x  ]      NO                                   PHYSICAL EXAM    Neurology: alert and oriented x 3, nonfocal, no gross deficits  CV : s1 s2 RRR  Sternal Wound :  CDI , Stable  Lungs: cta  rlq driveline cdi  R flank drain  Abdomen: soft, nontender, nondistended, positive bowel sounds, last bowel movement                        :    voiding        Extremities:   -   edema   /  -   calve tenderness ,              aspirin  chewable 81 milliGRAM(s) Oral daily  chlorhexidine 2% Cloths 1 Application(s) Topical <User Schedule>  ciprofloxacin     Tablet 500 milliGRAM(s) Oral every 12 hours  guaiFENesin Oral Liquid (Sugar-Free) 200 milliGRAM(s) Oral every 4 hours PRN  hydrALAZINE 20 milliGRAM(s) Oral three times a day  insulin lispro (ADMELOG) corrective regimen sliding scale   SubCutaneous every 6 hours  magnesium oxide 400 milliGRAM(s) Oral every 12 hours  methimazole 10 milliGRAM(s) Oral daily  metoclopramide Injectable 10 milliGRAM(s) IV Push every 8 hours  metoprolol tartrate 12.5 milliGRAM(s) Oral every 8 hours  mirtazapine 7.5 milliGRAM(s) Oral at bedtime  multivitamin 1 Tablet(s) Oral daily  ondansetron Injectable 8 milliGRAM(s) IV Push every 8 hours  pantoprazole  Injectable 40 milliGRAM(s) IV Push every 12 hours  sertraline 100 milliGRAM(s) Oral daily  simethicone 80 milliGRAM(s) Chew every 8 hours PRN  spironolactone 25 milliGRAM(s) Oral daily  thiamine 100 milliGRAM(s) Oral daily  urea Oral Powder 15 Gram(s) Oral daily  vancomycin    Solution 125 milliGRAM(s) Oral every 12 hours                    Physical Therapy Rec:   Home  [  ]   Home w/ PT  [  ]  Rehab  [  ]  Discussed with Cardiothoracic Team at AM rounds.

## 2021-11-08 NOTE — PROGRESS NOTE ADULT - PROBLEM SELECTOR PLAN 7
- currently tolerating PO pureed diet along with tube feeds, will continue to advance diet as tolerated  - remains on calorie count   - will remove NG tube, after completion of calorie count, and assessment of adequate nutrition.  - Encourage PO diet.

## 2021-11-08 NOTE — PROGRESS NOTE ADULT - PROBLEM SELECTOR PLAN 1
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 Unable to get a thyroid US due to his trach.   -check FT4 tomorrow  -continue MMI 10 mg po qdaily and adjust based on the labs  Continue to assess bilirubin and LFTs. MMI can have rare SE of agranulocytosis and hepatic dysfunction, but more commonly its biliary in nature.

## 2021-11-08 NOTE — PROGRESS NOTE ADULT - PROBLEM SELECTOR PLAN 2
- s/p HM2 in 2017  - Stable w/o evidence of LVAD malfunction, 9200 rpm, PI in 6's, stable.  - Continue with ASA 81 mg PO QD. Remains off Coumadin, h/o recurrent GI bleeding  - LDH elevated but stable

## 2021-11-08 NOTE — CHART NOTE - NSCHARTNOTEFT_GEN_A_CORE
Nutrition Chart Note    Chart reviewed, events noted. 66M, PMH ACC/AHA stage D HF 2/2 NICM s/p HM2 LVAD (9/2017). Here initially for GIB prolonged hospital course, s/p tracheostomy, acalculous cholecystitis s/p perc dawn. Patient c persistent intermittent abdominal pain - found with SMA stenosis on mesenteric angiogram on 9/10. S/p trach placement 10/4. S/p mesenteric angiogram with x2 stents (10/18). now made DNR. Most recently has been receiving supplemental PO feeds after seen by SLP; currently tolerating with EN infusions.    Source: EMR, Team, Pt    Diet, Pureed:   Mildly Thick Liquids (MILDTHICKLIQS)  Supplement Feeding Modality:  Oral  Ensure Enlive Cans or Servings Per Day:  3       Frequency:  Daily (11-08-21 @ 08:23)    Verbal consult from RN noting pt disliking pureed food options. RD spoke with kitchen to obtain additional options for patient that would be compliant with his diet; RD presented food options to pt and planned out meal options for the next few days. Dining services/kitchen staff made aware of patient's meal preferences. Will continue to provide Ensure Enlive supplements as well - pt consuming.     Noted calorie count still ongoing, RD to follow-up and assess upon completion.    RD remains available   Wendy Travis, MS, RD, CDN, [facility]  pager #[phone]

## 2021-11-08 NOTE — PROGRESS NOTE ADULT - SUBJECTIVE AND OBJECTIVE BOX
Chief Complaint/Follow-up on: Hyperthyroidism    Subjective: Patient not wanting to take medications from his nurse. He also did not want to be examined.     MEDICATIONS  (STANDING):  aspirin  chewable 81 milliGRAM(s) Oral daily  chlorhexidine 2% Cloths 1 Application(s) Topical <User Schedule>  ciprofloxacin     Tablet 500 milliGRAM(s) Oral every 12 hours  hydrALAZINE 20 milliGRAM(s) Oral three times a day  insulin lispro (ADMELOG) corrective regimen sliding scale   SubCutaneous every 6 hours  magnesium oxide 400 milliGRAM(s) Oral every 12 hours  methimazole 10 milliGRAM(s) Oral daily  metoclopramide Injectable 10 milliGRAM(s) IV Push every 8 hours  metoprolol tartrate 12.5 milliGRAM(s) Oral every 8 hours  mirtazapine 7.5 milliGRAM(s) Oral at bedtime  multivitamin 1 Tablet(s) Oral daily  ondansetron Injectable 8 milliGRAM(s) IV Push every 8 hours  pantoprazole  Injectable 40 milliGRAM(s) IV Push every 12 hours  sertraline 100 milliGRAM(s) Oral daily  spironolactone 25 milliGRAM(s) Oral daily  thiamine 100 milliGRAM(s) Oral daily  urea Oral Powder 15 Gram(s) Oral daily  vancomycin    Solution 125 milliGRAM(s) Oral every 12 hours    MEDICATIONS  (PRN):  guaiFENesin Oral Liquid (Sugar-Free) 200 milliGRAM(s) Oral every 4 hours PRN mucous plugging  simethicone 80 milliGRAM(s) Chew every 8 hours PRN Gas      PHYSICAL EXAM:  VITALS: T(C): 36.6 (11-07-21 @ 23:46)  T(F): 97.9 (11-07-21 @ 23:46), Max: 97.9 (11-07-21 @ 16:00)  HR: 113 (11-08-21 @ 11:00) (100 - 113)  BP: --  RR:  (16 - 20)  SpO2:  (100% - 100%)  Wt(kg): --  GENERAL: NAD, well-groomed, well-developed, pt refused to let me listen to his heart and lungs  EYES: No proptosis, no injection  HEENT:  Atraumatic, Normocephalic, moist mucous membranes  CARDIOVASCULAR: +tachycardic in the 110-120s    POCT Blood Glucose.: 163 mg/dL (11-08-21 @ 11:52)  POCT Blood Glucose.: 139 mg/dL (11-08-21 @ 05:36)  POCT Blood Glucose.: 106 mg/dL (11-07-21 @ 23:34)  POCT Blood Glucose.: 107 mg/dL (11-07-21 @ 21:50)  POCT Blood Glucose.: 122 mg/dL (11-07-21 @ 17:19)  POCT Blood Glucose.: 160 mg/dL (11-07-21 @ 11:34)  POCT Blood Glucose.: 129 mg/dL (11-07-21 @ 06:36)  POCT Blood Glucose.: 131 mg/dL (11-07-21 @ 00:07)  POCT Blood Glucose.: 118 mg/dL (11-06-21 @ 17:55)  POCT Blood Glucose.: 163 mg/dL (11-06-21 @ 12:33)  POCT Blood Glucose.: 122 mg/dL (11-06-21 @ 06:34)  POCT Blood Glucose.: 158 mg/dL (11-05-21 @ 23:58)  POCT Blood Glucose.: 139 mg/dL (11-05-21 @ 17:10)    11-08    134<L>  |  97  |  16  ----------------------------<  117<H>  4.2   |  25  |  0.48<L>    EGFR if : 134  EGFR if non : 116    Ca    9.3      11-08  Mg     1.5     11-08  Phos  3.4     11-08    TPro  7.5  /  Alb  3.8  /  TBili  0.4  /  DBili  x   /  AST  23  /  ALT  32  /  AlkPhos  160<H>  11-08          Thyroid Function Tests:  11-03 @ 05:32 TSH 0.06T3 84   11-02 @ 02:04  FreeT4 1.4

## 2021-11-09 NOTE — PROGRESS NOTE ADULT - PROBLEM SELECTOR PLAN 6
- S/p SMA stent 10/18, currently on ASA 81 mg PO QD. Continue to trend Hgb. - S/p SMA stent 10/18, currently on ASA 81 mg PO QD.

## 2021-11-09 NOTE — PROGRESS NOTE ADULT - PROBLEM SELECTOR PLAN 2
-pt successfully titrated off of his steroids. No need for further POC glucose testing.  -discussed with PATIENCE Bhandari. Will sign off, reconsult as needed.  Sally Beatty MD  Endocrinology Attending  Mondays and Tuesdays 9am-6pm: [phone] (pager)  Other days, night and weekend: [phone]

## 2021-11-09 NOTE — PROGRESS NOTE ADULT - PROBLEM SELECTOR PLAN 3
- Has been transfused multiple times throughout this admission, last transfusion was on 10/23  - Hb currently stable, on ASA 81mg Q24. - Has been transfused multiple times throughout this admission, last transfusion was on 10/23  - Hb currently stable, on ASA 81mg Q24.  - Please make Protonix 40 mg PO BID

## 2021-11-09 NOTE — PROGRESS NOTE ADULT - ATTENDING COMMENTS
Patient has made some progress since I last saw him, but he continues to have nausea and occasional vomiting and he remains malnourished. The SMA stent does not appear to have made a large difference in his overall symptoms as he has persistent abdominal discomfort. He is tolerating some food by mouth. He is cachectic and malnourished.    Goal is to advance his oral intake. Can give meds with pudding or applesauce.  Change hydralazine to 25 mg po TID.  Change lopressor to Toprol XL 50 mg qPM (slightly increased dose for tachycardia).  Try to switch all IV meds to po.  Please check TFTs.  Dispo planning pending evaluation of trach and possibility of decannulation.

## 2021-11-09 NOTE — PROGRESS NOTE ADULT - ASSESSMENT
Mental status exam: Seen sitting in chair with Trach collar. Alert and oriented x3. Speech volume improved. Able to communicate better. Thought process goal oriented; content appropriate to conversation. Mood neutral. Affect engaged, less constricted. Denies symptoms of hopelessness, depression. Denies SI/HI. Insight and judgment adequate.      Psychological assessment: Seen sitting in chair. Appetite ok, doesn't like diet. Denies abdominal pain. Sleeping ok at night. Mood neutral. Engaged throughout assessment. Smiling at times. Happy with progress he's made. Feels supported by his sister. Feels he doesn't have close relationships with other family members. Denies symptoms of hopelessness, depression. Denies feeling worried or nervous. Denies SI/HI. No signs of delirium. Receptive to support and validation.     Dx: Adjustment disorder with anxiety and depressed mood. F43.23 Systolic heart failure I50.2  LVAD Z95.811. Delirium, mixed hypoactive/hyperactive. F05    Recommendations:   When possible, take outside weather permitting    Provide emotional support   Behavioral Cardiology will follow      18 minutes spent on total patient encounter    Jessica Hennessy, PhD  [phone]

## 2021-11-09 NOTE — PROGRESS NOTE ADULT - ASSESSMENT
65 YO M with a history of stage D NICM s/p HM2 on 9/2017 as DT (due to severe PAD) with TV ring, prior COVID-19 infection 4/2020, recurrent syncope post LVAD s/p ILR, and chronic abdominal pain with prior negative workup who was admitted 6/14/21 with symptomatic anemia with Hgb 4.5 in setting of INR 8.8 without hemodynamic instability and has since had a protracted hospitalization. He was transfused and underwent VCE which showed active bleeding in the mid small bowel but subsequent enteroscopy 6/15 did not reveal any active bleeding. He acutely decompensated after procedure with fever/hypertension, low flow alarms, and pulmonary infiltrate with hypoxia requiring intubation from probable aspiration PNA. He was unable to extubated and has since undergone tracheostomy but tolerating persistent trach collar and nearing ability for decannulation. 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 He underwent angiogram on 9/10, stent was unable to be placed and course was then complicated by RP bleed. He continued to have persistent leukocytosis and febrile episodes and was noted to have positive sputum culture for serratia marcescens and completed his course of abx. He was initially decannulated on 9/23. Recently he started having multiples of melena, emesis and went into acte respiratory distress and was ultimately re-intubated on 9/26.     He had blood cultures positive for enterobacter cloacae/serratia and sputum positive for stenotrophomonas remains on IV antibiotics. He is s/p trach with ENT on 10/4. He underwent SMA stent x 2 on 10/18. His nausea and vomiting has improved, and he is now tolerating PO diet along with tube feeds. His DNR/DNI has been rescinded. Will continue to advance diet as tolerated, with goals to remove NG tube and decannulate. Goal will be discharged to [facility] when stable.

## 2021-11-09 NOTE — CHART NOTE - NSCHARTNOTEFT_GEN_A_CORE
Na over 130. Nephrology signing off. Please ensure good PO solute intake with Pt. Please ensure Nausea and Pain are adequately controlled to prevent hyponatremia. Please reconsult as needed.

## 2021-11-09 NOTE — PROGRESS NOTE ADULT - SUBJECTIVE AND OBJECTIVE BOX
Behavioral Cardiology Progress Note     History of present illness: Mr. Quispe is a 66 year-old man with history of NICM s/p HM2 on 9/2017 as DT (due to severe PAD) with TV ring, prior COVID-19 infection 4/2020, recurrent syncope post LVAD s/p ILR, and chronic abdominal pain with prior negative workup who was admitted with symptomatic anemia with Hgb 4.5 in setting of INR 8.8 without hemodynamic instability. Testing showed active bleeding in the small bowel but subsequent enteroscopy 6/15 did not reveal any active bleeding. He acutely decompensated after procedure with fever/hypertension, low flow alarms, and pulmonary infiltrate with hypoxia requiring intubation from probable aspiration PNA.  Course notable for inability to wean ventilator with persistent secretions for which he underwent tracheostomy as well as acute c diff colitis.     Social history: , lives in his sister’s house in [place]. Has 3 sons (2 live in , 1 in [facility]). One of 3 siblings. Lives in his sister’s house in [place] (Cristina Rudolph [phone]), also in the home are niece ([facility] [phone]) and nephew (Mliler Rudolph).  Another sister lives close by in [place] and all other siblings live in [place] which the family visit often.  Worked full time at [employer] in [place], stopped working due to heart disease. Education: high school graduate.     Substance use:   Tobacco: Former smoker, 8-10 cigarettes/day for 30 years.   Alcohol: Past use of 3-4 drinks of Johnson 2x/week. None for past 4 years.   Drug: Denies any drug use.

## 2021-11-09 NOTE — PROGRESS NOTE ADULT - PROBLEM SELECTOR PLAN 1
- ACC/AHA stage D systolic heart failure s/p LVAD   - Currently on Lopressor 12.5 mg PO Q8, (MAP goal 70-80).  - continue with hydralazine 20mg PO Q8.  - continue with Spironolactone 25mg PO Q24  - Encourage ambulation, PT. - ACC/AHA stage D systolic heart failure s/p LVAD   - Please transition from Lopressor 12.5 mg PO Q8 to Toprol XL 50 mg QD (first dose starting tonight)  - Please increase hydralazine 25mg PO Q8.  - continue with Spironolactone 25mg PO Q24  - Encourage ambulation, PT.

## 2021-11-09 NOTE — PROGRESS NOTE ADULT - SUBJECTIVE AND OBJECTIVE BOX
VITAL SIGNS    Telemetry:  st 120    Vital Signs Last 24 Hrs  T(C): 36.6 (21 @ 09:30), Max: 36.9 (21 @ 19:30)  T(F): 97.9 (21 @ 09:30), Max: 98.4 (21 @ 19:30)  HR: 125 (21 @ 09:46) (120 - 129)  BP: 102/74 (21 @ 15:46) (102/74 - 102/74)  RR: 18 (21 @ 09:46) (18 - 20)  SpO2: 100% (21 @ 09:46) (100% - 100%)                    @ 07:01  -   @ 07:00  --------------------------------------------------------  IN: 660 mL / OUT: 1375 mL / NET: -715 mL     @ 07:01  -   @ 11:51  --------------------------------------------------------  IN: 240 mL / OUT: 250 mL / NET: -10 mL          Daily     Daily Weight in k.4 (2021 05:56)            CAPILLARY BLOOD GLUCOSE      POCT Blood Glucose.: 122 mg/dL (2021 11:37)  POCT Blood Glucose.: 78 mg/dL (2021 08:28)  POCT Blood Glucose.: 86 mg/dL (2021 05:20)  POCT Blood Glucose.: 132 mg/dL (2021 23:34)  POCT Blood Glucose.: 125 mg/dL (2021 17:32)  POCT Blood Glucose.: 163 mg/dL (2021 11:52)            Drains:     R flank choley drain    Pacing Wires        [  ]   Settings:                                  Isolated  [  ]    Coumadin    [ ] YES          [ x ]      NO                                   PHYSICAL EXAM    Neurology: alert and oriented x 3, nonfocal, no gross deficits  CV : s1 s2 RRR  Sternal Wound :  CDI , Stable  Lungs: cta  rlq driveline cdi  R flank drain  Abdomen: soft, nontender, nondistended, positive bowel sounds, last bowel movement                        :    voiding        Extremities:   -   edema   /  -   calve tenderness ,              aspirin  chewable 81 milliGRAM(s) Oral daily  chlorhexidine 2% Cloths 1 Application(s) Topical <User Schedule>  ciprofloxacin     Tablet 500 milliGRAM(s) Oral every 12 hours  hydrALAZINE 20 milliGRAM(s) Oral three times a day  insulin lispro (ADMELOG) corrective regimen sliding scale   SubCutaneous every 6 hours  magnesium oxide 400 milliGRAM(s) Oral every 12 hours  methimazole 10 milliGRAM(s) Oral daily  metoclopramide Injectable 10 milliGRAM(s) IV Push every 8 hours  metoprolol tartrate 12.5 milliGRAM(s) Oral every 8 hours  mirtazapine 7.5 milliGRAM(s) Oral at bedtime  ondansetron Injectable 8 milliGRAM(s) IV Push every 8 hours  pantoprazole  Injectable 40 milliGRAM(s) IV Push every 12 hours  sertraline 100 milliGRAM(s) Oral daily  spironolactone 25 milliGRAM(s) Oral daily  urea Oral Powder 15 Gram(s) Oral daily  vancomycin    Solution 125 milliGRAM(s) Oral every 12 hours                    Physical Therapy Rec:   Home  [  ]   Home w/ PT  [  ]  Rehab  [  ]  Discussed with Cardiothoracic Team at AM rounds.

## 2021-11-09 NOTE — PROGRESS NOTE ADULT - PROBLEM SELECTOR PLAN 7
- currently tolerating PO pureed diet along with tube feeds, will continue to advance diet as tolerated  - remains on calorie count   - will remove NG tube, after completion of calorie count, and assessment of adequate nutrition.  - Encourage PO diet. - currently tolerating PO pureed diet along with tube feeds, will continue to advance diet as tolerated  - remains on calorie count   - will remove NG tube, after completion of calorie count, and assessment of adequate nutrition.  - Encourage PO diet.  - SLP following and will undergo FEES study later this week (date pending)

## 2021-11-09 NOTE — CHART NOTE - NSCHARTNOTEFT_GEN_A_CORE
Nutrition Follow Up Note  Patient seen for: Calorie Count assessment    Chart reviewed, events noted. Per chart: 66M, PMH ACC/AHA stage D HF 2/2 NICM s/p HM2 LVAD (2017). Here initially for GIB prolonged hospital course, s/p tracheostomy, acalculous cholecystitis s/p perc dawn. Patient c persistent intermittent abdominal pain - found with SMA stenosis on mesenteric angiogram on 9/10. S/p trach placement 10/4. S/p mesenteric angiogram with x2 stents (10/18). now made DNR. Most recently has been receiving supplemental PO feeds after seen by SLP; Calorie count ordered to assess adequacy od PO intake    Source: EMR, Team, pt    -If unable to interview patient: [] Trach/Vent/BiPAP  [] Disoriented/confused/inappropriate to interview    Diet Order:   Diet, Pureed:   Mildly Thick Liquids (MILDTHICKLIQS)  Supplement Feeding Modality:  Oral  Ensure Enlive Cans or Servings Per Day:  3       Frequency:  Daily (21)    Calorie Count initiated -, results are as follows:  : 646kcals & 37g protein - noted calorie count initiated after breakfast  (bfast not included in this count)  : 1080kcals & 411g protein  : 1200kcals & 37g protein  * Average 3 day PO intake: 975kcals & 38g protein - Meets 56% low end energy needs & 45% low end protein needs  - Noted findings consistent with calorie count conducted 11/3- where average daily intake met 57% lower end energy needs & 45% lower end protein needs.    Previously ordered for EN of Vital 1.5 @ 50ml/hr x 24hrs (1200ml formula, 1800kcals, 81g protein, 917ml free H2O) - supplemental EN held as of   EN provisions per chart:     () 33% EN goal volume achieved     () 46% EN goal volume achieved     () 71% EN goal volume achieved    Spoke with pt on increased nutritional needs at this time and ways to optimize nutritional intake while with decreased appetite. Discussed consuming small frequent meals, keeping nonperishable food items at bedside to consume throughout the day, and drinking oral nutrition supplement between meals. Pt made aware of calorie count ordered to assess adequacy of PO intake and if he is unable to meet his nutritional intake via PO diet alone, will have to provide additional means of nutrition as well. New calorie count hung on door this AM - Team made aware.   - To note, RD reviewed meal options with pt yesterday - kitchen aware of pt's preferences.    Nutrition-Related Events  - Pt notes he has been drinking 2 Ensure Enlive supplements daily, however calorie count sheet only revealed ~1 supplement daily; Pt also consuming Magic cup daily.  - FEES completed 10/29 with SLP recommendations for "Pureed texture diet with Mildly Thick Liquids"  - Remeron ordered  - Insulin Sliding Scale ordered to optimize BG  - Multivitamin & MagOx supplementation ordered daily    GI:  Last BM .   Bowel Regimen? [] Yes   [x] No  - Noted on abx course at this time  - Pt denies chewing/swallowing issues, nausea/vomiting, diarrhea/constipation or other signs of acute GI distress at this time.     Weight in k.4 (), 51.6 (), 51.5 (), 51.1 (), 50 (), 53.7 (), 52 (10-31), 50.8 (10-30), 54.7 (10-26), 55.3 (10-18), 59.8 (10-04), 62.1 (), 58.4 (), 71.2 ()    Drug Dosing Weight  Weight (kg): 61.6 (18 Oct 2021 15:55)  BMI (kg/m2): 19.5 (18 Oct 2021 15:55)  Admission weight: 176.3 pounds / 80 kg (6/15)    MEDICATIONS  (STANDING):  ciprofloxacin     Tablet  hydrALAZINE  insulin lispro (ADMELOG) corrective regimen sliding scale  magnesium oxide  methimazole  metoprolol tartrate  pantoprazole  Injectable  spironolactone  urea Oral Powder  vancomycin    Solution    Pertinent Labs:  @ 05:18: Na 131<L>, BUN 10, Cr 0.50, BG 84, K+ 4.0, Phos 4.3, Mg 1.8, Alk Phos 170<H>, ALT/SGPT 34, AST/SGOT 27, HbA1c --    A1C with Estimated Average Glucose Result: 5.4 % (08-15-21 @ 13:52)  A1C with Estimated Average Glucose Result: 5.6 % (06-15-21 @ 02:42)    Finger Sticks:  POCT Blood Glucose.: 122 mg/dL ( @ 11:37)  POCT Blood Glucose.: 78 mg/dL ( @ 08:28)  POCT Blood Glucose.: 86 mg/dL ( @ 05:20)  POCT Blood Glucose.: 132 mg/dL ( @ 23:34)  POCT Blood Glucose.: 125 mg/dL ( @ 17:32)    Skin per nursing documentation: tracheal wound; no pressure injuries noted  Edema: none noted    Estimated Nutritional Needs  Recalculated based on recent weight 53.1kg (10/21)  Energy Needs (33-38 kcals/kg): 9874-3979  Protein Needs (1.6-2.0 g/kg):     Previous Nutrition Diagnosis: Severe chronic malnutrition  Nutrition Diagnosis is: [x] ongoing  - addressed with PO diet, oral nutrition & micronutrient supplementation; calorie count completed    New Nutrition Diagnosis: [x] Not applicable    Nutrition Care Plan:  [x] In Progress  [] Achieved  [] Not applicable    Recommendations:      1. Calorie count assessed - Pt meeting, on average, 56% low end energy needs & 45% low end protein needs daily. New calorie count initiated this AM, RD to follow-up to assess upon completion  2. Continue current diet as ordered with Ensure Enlive x3 daily - defer textures/consistencies to SLP/Team; RD to continue providing Magic Cup x2 daily  3. May consider supplemental EN to meet 100% nutritional needs  - If continuous 24hr feeds recommend: Vital 1.5 @ 25ml/hr x 24hrs to provide 600ml formula, 900kcals, 41g protein, 458ml free H2O (meets 17kcals/kg & 0.8g protein/kg based on 10/21 wt 53.1kg)  - If nocturnal feeds, recommend: Vital 1.5 @ 50ml/hr x 12hrs to provide 600ml formula, 900kcals, 41g protein, 458ml free H2O (meets 17kcals/kg & 0.8g protein/kg based on 10/21 wt 53.1kg)  4. Continue Micronutrient supplementation as ordered  5. RD to provide dietary preferences as feasible; RD remains available     Monitoring and Evaluation:   Continue to monitor nutritional intake, tolerance to diet prescription, weights, labs, skin integrity  RD remains available upon request and will follow up per protocol    Wendy Travis MS, RD, CDN, [facility]  pager #[phone]

## 2021-11-09 NOTE — PROGRESS NOTE ADULT - PROBLEM SELECTOR PLAN 1
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 Unable to get a thyroid US due to his trach.   -continue MMI 10 mg po qdaily and can recheck a FT4 in 4-6 weeks (1st week of December)  Continue to assess bilirubin and LFTs. MMI can have rare SE of agranulocytosis and hepatic dysfunction, but more commonly its biliary in nature.

## 2021-11-09 NOTE — PROGRESS NOTE ADULT - ASSESSMENT
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 coli UTIs, driveline drainage (1/7/21) and COVID-19 (back in April 2020)  He was recently seen in clinic where he complained of abdominal pain and dark stools w constipation back in May. He presents to [facility] ER today weakness and fatigue, moderate and + Black stools for three days, on coumadin secondary to warfarin use in the setting of an LVAD. Patient has required transfusions for GIB in the past. Mostly recently back in jan 2021 pt had anemia with dark stools. No interventions was done at that time. However Last Endoscopy was done in July 2020 (negative). Today labs show patient is anemic with H/H of 4.5/16.3,. INR is 8.84 MAP in the 90s, Temp 35.1. He denies any chest pain, shortness of breath, dizziness, abd pain, nausea or vomiting.       (14 Jun 2021 16:57)  Surgery/Hospital Course:  6/14 admit for melena w/ anemia, INR 8.84   6/15 Capsul study (+) for small bowel bleed, balloon endoscopy (old blood in prox ileum); post EGD - septic w/ L opacity, re-intubated for concern for aspiration, TTE (Mod MR, decrease biV w/ interventricular septum boweing towards R)  6/17 bronch   6/20 +C Diff   6/22 CT C/A/P: Fluid filled colon which may be 2/2 rapid transit. Small bilateral pleffs with associates. Compressive atelectasis New ISABELLE & LLL  parenchymal opacities, suspicious for pneumonia. Moderate stenosis in the proximal superior mesenteric artery.   6/25 #8 Shiley trach at bedside   7/1 LVAD speed increased to 9200  7/2 Bronch  7/20 TC since 7/7. Patient transferred to SDU.   7/23 INR today 2.64.  H&H 7.3/24 this AM.  Will repeat CBC at noon, and will send stool guaiac Patient with persistent abdominal tenderness, rate of tube feeds decreased.  No nausea/vomiting.    7/24 INR today 2.4. H&H 9.1/28.6 low flow overnight /N&V, refusing Tube feeds on D5 1/2 normal  @50 cc/hr  7/25 INR 2.69  H&H 7.7/25.1 refusing Tube feeds on D5 1/2 normal  @50 cc/hr. This am + BM Melena Dr Oneill HF  aware- PRBC x1  GI team consulted -  NPO  plan on study in am-  D/w Dr Cadet Patient  to return to CTU for further management; 1PRBCS   7/27 Post op INR 2.2 today.  No bleeding. BC + for SM.  Pt is hypotensive requiring pressor and inotropic support.  ID follow up today on Cefepime will follow.  7/26 R PTC for PTX   7/28 CT C/A/P: sub q emphysema in R chest wall, GGO RUL, small ascites CTH negative; Abd US: GB thickening, pericholecystic fluid    7/30 perc choley  7/31 Perchole drain in place continues to drain total output overnight 133.  Fever today 38.8   8/1 duplex LE negative   8/7 Patient with persistent abdominal pain, refusing tube feeds and medications, Psych consulted  8/13 CTA A/P ordered to r/o mesenteric ischemia 2/2 persistent anorexia, nausea, vomiting. Revealed:  Evaluation of the mesenteric vessels is limited by streak artifact from LVAD. There appears to be severe stenosis of the proximal SMA; abdominal mesenteric doppler is recommended for further evaluation. 2.  No small bowel findings to suggest acute mesenteric ischemia. 3.  Focal dissections involving the right and left common iliac arteries.  8/15: Cultures resulted BC 1/2 +GPC in clusters, SC enterobacter; mesenteric duplex: borderline stenosis of proximal SMA  8/27: CT C/A/P noncon: Nondular opacities in R lung apex w/cavitation, abd nl  8/31:  Continue current care, treatment of thyrotoxicosis with medications as per endocrine, d/c ABX as per team.   9/8 RUQ sono: Contracted gallbladder with cholecystostomy tube in place.  9/10 failed SMA stent, c/b RP bleed s/p 3U PRCB  9/12 CTA Abd/Pelvis L RP hematoma   9/24 Trach decannulated   9/26 intubated fro resp distress for increased WOB, LL pneumonia; CT C/A/P: progressive ISABELLE opacities and L consolidations, multifocal pneumonia   9/30 TTE (EF 25%, decreased RV, mod MR)   10/3 VT -> Lidocaine gtt   10/4 Trach size 6 DCT cuff  10/5 CT abd (neg for intra-abd abcess, severe stenosis of prox SMA and b/l iliac arteries)  10/18 SMA Stent   10/23 Emend for nausea   10/24 +Occult  10/29 FEES, RUQ ascites and pericholecystic fluid   Theckened liqwuids puree diet- calorie ct    Today:  Ambulation, TC, and PO diet as tolerated.  Day 2 of calorie count  Discussions yesterday as to discharge planning, agreeable to go to [facility] however will need to be decannulated and NGT removed.  Patient candidate for SDU.   11/4 Transferred to sdu.  Must be oob - chair for all meals.  Eventual change to uncuffed trach.  11/5 VSS; rsr/ st ; changed trach to #6 shiley cuffless this am by ENT- tolerated well; continue nutritional support with kaofeed; h/h stable 8/26 today   DNR rescinded today as per CHF/ LVAD team   11/6 VSS: pt tolerating supervised po diet; shantelle count in progress; trach care/suctioning q 3 hours; increase hydral 20 q8 as per CHF/LVAD team  11/7   OOB to chair,   vss   keofeed w/ shantelle count    dawn drain    ald 25 qd  added by HF,  no LVAD alarms  11/8 sluggish today, refusing meds.    Plan to hold TF and increase PO intake. If adequate intake plan to d/c ngt  11/9   Cont off TF, calorie count .

## 2021-11-09 NOTE — PROGRESS NOTE ADULT - SUBJECTIVE AND OBJECTIVE BOX
Chief Complaint/Follow-up on:     Subjective:    MEDICATIONS  (STANDING):  aspirin  chewable 81 milliGRAM(s) Oral daily  chlorhexidine 2% Cloths 1 Application(s) Topical <User Schedule>  ciprofloxacin     Tablet 500 milliGRAM(s) Oral every 12 hours  hydrALAZINE 25 milliGRAM(s) Oral every 8 hours  insulin lispro (ADMELOG) corrective regimen sliding scale   SubCutaneous every 6 hours  magnesium oxide 400 milliGRAM(s) Oral every 12 hours  methimazole 10 milliGRAM(s) Oral daily  metoclopramide Injectable 10 milliGRAM(s) IV Push every 8 hours  metoprolol succinate ER 50 milliGRAM(s) Oral daily  mirtazapine 7.5 milliGRAM(s) Oral at bedtime  pantoprazole    Tablet 40 milliGRAM(s) Oral two times a day  sertraline 100 milliGRAM(s) Oral daily  spironolactone 25 milliGRAM(s) Oral daily  urea Oral Powder 15 Gram(s) Oral daily  vancomycin    Solution 125 milliGRAM(s) Oral every 12 hours    MEDICATIONS  (PRN):  ondansetron Injectable 4 milliGRAM(s) IV Push every 8 hours PRN Nausea and/or Vomiting      PHYSICAL EXAM:  VITALS: T(C): 36.6 (11-09-21 @ 13:00)  T(F): 97.8 (11-09-21 @ 13:00), Max: 98.4 (11-08-21 @ 19:30)  HR: 103 (11-09-21 @ 15:31) (103 - 129)  BP: --  RR:  (18 - 18)  SpO2:  (100% - 100%)  Wt(kg): --  GENERAL: NAD, well-groomed, well-developed  EYES: No proptosis, no injection  HEENT:  Atraumatic, Normocephalic, moist mucous membranes  THYROID: Normal size, no palpable nodules  RESPIRATORY: Clear to auscultation bilaterally; No rales, rhonchi, wheezing, or rubs  CARDIOVASCULAR: Regular rate and rhythm; No murmurs; no peripheral edema  GI: Soft, nontender, non distended, normal bowel sounds  CUSHING'S SIGNS: no striae    POCT Blood Glucose.: 122 mg/dL (11-09-21 @ 11:37)  POCT Blood Glucose.: 78 mg/dL (11-09-21 @ 08:28)  POCT Blood Glucose.: 86 mg/dL (11-09-21 @ 05:20)  POCT Blood Glucose.: 132 mg/dL (11-08-21 @ 23:34)  POCT Blood Glucose.: 125 mg/dL (11-08-21 @ 17:32)  POCT Blood Glucose.: 163 mg/dL (11-08-21 @ 11:52)  POCT Blood Glucose.: 139 mg/dL (11-08-21 @ 05:36)  POCT Blood Glucose.: 106 mg/dL (11-07-21 @ 23:34)  POCT Blood Glucose.: 107 mg/dL (11-07-21 @ 21:50)  POCT Blood Glucose.: 122 mg/dL (11-07-21 @ 17:19)  POCT Blood Glucose.: 160 mg/dL (11-07-21 @ 11:34)  POCT Blood Glucose.: 129 mg/dL (11-07-21 @ 06:36)  POCT Blood Glucose.: 131 mg/dL (11-07-21 @ 00:07)  POCT Blood Glucose.: 118 mg/dL (11-06-21 @ 17:55)    11-09    131<L>  |  95<L>  |  10  ----------------------------<  84  4.0   |  24  |  0.50    EGFR if : 132  EGFR if non : 114    Ca    9.8      11-09  Mg     1.8     11-09  Phos  4.3     11-09    TPro  8.2  /  Alb  3.9  /  TBili  0.6  /  DBili  x   /  AST  27  /  ALT  34  /  AlkPhos  170<H>  11-09          Thyroid Function Tests:  11-09 @ 08:37 TSH -- FreeT4 1.7 T3 -- Anti TPO -- Anti Thyroglobulin Ab -- TSI --  11-03 @ 05:32 TSH 0.06 FreeT4 -- T3 84 Anti TPO -- Anti Thyroglobulin Ab -- TSI --                           Chief Complaint/Follow-up on: hyperthyroidism    Subjective: Patient completed steroid taper and is stable on MMI 10mg po qdaily. Today his FT4 was 1.7.  He is being advanced to a full diet with cessation of TF. He denies any complaints but is not in the mood to talk.    MEDICATIONS  (STANDING):  aspirin  chewable 81 milliGRAM(s) Oral daily  chlorhexidine 2% Cloths 1 Application(s) Topical <User Schedule>  ciprofloxacin     Tablet 500 milliGRAM(s) Oral every 12 hours  hydrALAZINE 25 milliGRAM(s) Oral every 8 hours  insulin lispro (ADMELOG) corrective regimen sliding scale   SubCutaneous every 6 hours  magnesium oxide 400 milliGRAM(s) Oral every 12 hours  methimazole 10 milliGRAM(s) Oral daily  metoclopramide Injectable 10 milliGRAM(s) IV Push every 8 hours  metoprolol succinate ER 50 milliGRAM(s) Oral daily  mirtazapine 7.5 milliGRAM(s) Oral at bedtime  pantoprazole    Tablet 40 milliGRAM(s) Oral two times a day  sertraline 100 milliGRAM(s) Oral daily  spironolactone 25 milliGRAM(s) Oral daily  urea Oral Powder 15 Gram(s) Oral daily  vancomycin    Solution 125 milliGRAM(s) Oral every 12 hours    MEDICATIONS  (PRN):  ondansetron Injectable 4 milliGRAM(s) IV Push every 8 hours PRN Nausea and/or Vomiting      PHYSICAL EXAM:  VITALS: T(C): 36.6 (11-09-21 @ 13:00)  T(F): 97.8 (11-09-21 @ 13:00), Max: 98.4 (11-08-21 @ 19:30)  HR: 103 (11-09-21 @ 15:31) (103 - 129)  BP: --  RR:  (18 - 18)  SpO2:  (100% - 100%)  Wt(kg): --  GENERAL: NAD, well-groomed, well-developed, +trach in place  HEENT:  Atraumatic, Normocephalic, moist mucous membranes  RESPIRATORY: Clear to auscultation bilaterally; No rales, rhonchi, wheezing, or rubs  CARDIOVASCULAR: +tachycardic to the 110s.  GI: Soft, nontender, non distended, normal bowel sounds      POCT Blood Glucose.: 122 mg/dL (11-09-21 @ 11:37)  POCT Blood Glucose.: 78 mg/dL (11-09-21 @ 08:28)  POCT Blood Glucose.: 86 mg/dL (11-09-21 @ 05:20)  POCT Blood Glucose.: 132 mg/dL (11-08-21 @ 23:34)  POCT Blood Glucose.: 125 mg/dL (11-08-21 @ 17:32)  POCT Blood Glucose.: 163 mg/dL (11-08-21 @ 11:52)  POCT Blood Glucose.: 139 mg/dL (11-08-21 @ 05:36)  POCT Blood Glucose.: 106 mg/dL (11-07-21 @ 23:34)  POCT Blood Glucose.: 107 mg/dL (11-07-21 @ 21:50)  POCT Blood Glucose.: 122 mg/dL (11-07-21 @ 17:19)  POCT Blood Glucose.: 160 mg/dL (11-07-21 @ 11:34)  POCT Blood Glucose.: 129 mg/dL (11-07-21 @ 06:36)  POCT Blood Glucose.: 131 mg/dL (11-07-21 @ 00:07)  POCT Blood Glucose.: 118 mg/dL (11-06-21 @ 17:55)    11-09    131<L>  |  95<L>  |  10  ----------------------------<  84  4.0   |  24  |  0.50    EGFR if : 132  EGFR if non : 114    Ca    9.8      11-09  Mg     1.8     11-09  Phos  4.3     11-09    TPro  8.2  /  Alb  3.9  /  TBili  0.6  /  DBili  x   /  AST  27  /  ALT  34  /  AlkPhos  170<H>  11-09          Thyroid Function Tests:  11-09 @ 08:37 FreeT4 1.7   11-03 @ 05:32 TSH 0.06 FreeT4 -- T3 84

## 2021-11-09 NOTE — PROGRESS NOTE ADULT - SUBJECTIVE AND OBJECTIVE BOX
RICKY JOINT  MRN#: [id number]  Subjective:  Pulmonary progress  : recurrent Acute hypoxic respiratory Failure ,aspiration pneumonia, NICM  ,   65M PMH ACC/AHA stage D HF due to NICM HM2 LVAD , TV annuloplasty ring 17 as destination therapy due to severe peripheral artery disease with significant stenosis  SIADH, Depression, CKD-3 with hyperkalemia, past E. coli UTIs, driveline drainage (21) and COVID-19 (back in 2020)  He was recently seen in clinic where he complained of abdominal pain and dark stools w constipation back in May. He presents to [facility] ER today weakness and fatigue, moderate and + Black stools for three days, on coumadin secondary to warfarin use in the setting of an LVAD. Patient has required transfusions for GIB in the past. Mostly recently back in 2021 pt had anemia with dark stools. No interventions was done at that time. However Last Endoscopy was done in 2020 (negative). Today labs show patient is anemic with H/H of 4.5/16.3,. INR is 8.84 MAP in the 90s, Temp 35.1. He denies any chest pain, shortness of breath, dizziness, abd pain, nausea or vomiting. found to have  rectal bleeding underwent endoscopy ,old blood in the proximal ileum ,  develop sepsis with LL opacity given Antibiotics , Extubated , reintubated , Bronchoscopy on Zosyn for LL pneumonia  and Amiodarone S/P TV Annuloplasty , patient remain intubated on full ventilatory support .S/P multiple units of blood transfusion , remain on full ventilatory support on Precedex and propofol , new central IJ line , diarrhea C diff. +ve on po vancomycin and IV Flagyl,  mildly distended belly , fever start on cefepime 2gm q 8 hrs S/P tracheostomy .  new RT Subclavian central line continue on contact  isolation ,S/P  C-diff antibiotics, no more diarrhea back on full support mechanical ventilator , chest x ray show improvement in LLL air space disease, more awake and responsive on tube feeding no more diarrhea ,  no nausea or vomiting or diarrhea still very weak and tired , back on tube feeding ,still on po vancomycin , getting PT and OT at bed side ,   , no SOB getting stronger , improve muscle tone patient transfer to monitor bed still on contact isolation for C-Difficel colitis on 50% FI02, and change to Suresh  tube feeding still loose stool . H/H drop significantly require blood transfusion , most likely GI bleeding , IV heparin D/C ,  H/H is stable ., patient develop TR sided  pneumothorax require chest tube placement , RT IJ central line  placed , develop fever shaking chills , blood culture positive for serratia marcescens , start on cefepime .the patient  become hypoxic and hypotensive placed on full ventilatory support and Vasopressin , levophed and Dobutamine ,S/P blood transfusion on meropenem and vancomycin ,   , on and  off pressors , occasional agitation on Precedex .S/P IR cholecystostomy tube drainage placement in the RT upper Quadrant , resume anticoagulation chest x ray noted C-PAP trail lasted only for 2 hrs , new RT SC line and D/C RT IJ line , RT pig tail cathter has been removed , tolerating C-PAP trial placed on trach. collar 50% FI02 GI consultant noted on NG tube feeding as tolerated , develop AF RVR S/P  bolus Amiodarone  back to regular sinus Rhythm , Flat Affect depressed , back on tube feeding Vital AF at 60 cc/ hr .still intermittent abdominal pain , no fever saturation is accepted  back on full ventilatory support ,   on methimazole for hyperthyroidism ,  condition is the same ,still C/O Abdominal pain , white count is improving , no chest pain or SOB ,  .placed on Reglan 10 mg TID for gastric motility , depressed , withdrawn. , S/P  mesenteric angiogram , unable to stent SMA S/P 3 units of blood transfusion   RT IJ in place reassessed for stent in the SMA by vascular,  dark stool stable H/H ,surgical opinion for possible Lap cholecystectomy. over night events noted develop respiratory distress, , rectal bleeding, require intubation placed on full ventilatory support , FI02 is 50% also became hemianosmically unstable require triple pressor support with levophed , vasopressin and norsynephrine, pan culture placed  on Vancomycin IV and PO  and Zosyn, sedated with propofol kept NPO , new central line RT and left IJ cathter placed . Diflucan Added .fever of 38.5 weaned off  vasopressin ,   fever adding IV vancomycin and  vancomycin solution  , and Bactrim , S/P  tracheostomy , bleeding receiving blood transfusion on vasopressin , no more bleeding , no fever , more awake and responsive ,tolerating tube feeding , ID and heart failure follow up appreciated , depresses no weaning for now , magnesium is low to give supplement too keep Above 2 , patient S/P  vascular procedure for superior mesenteric artery occlusion S/P 2 stent placement , patient tolerate it very well  , left upper extremities hematoma .vascular follow up appreciated , increase WBC , new RT UPE midline , black tarry stool , very loose R/O recurrent GI bleeding and C-dificile  colitis stable H/H no events over night , diarrhea is improved ,  WBC is improving  , hyponatremia no change , S/P FEEST study result is pending , PO feeding with observation . on Cipro antibiotics  no major over night events , worsening LFTs for repeat Abdominal sonogram pending , still diarrhea .but less , trial of po feeding , on monitor bed .start decannulation to cuffless trach . on 40% FI02, po feeding no events of aspiration .consider capping the trach. no fever minimal secretion , +BM , mild thick withe secretion , good saturation , on pure diet may D/C NG tube feeding      (2021 16:57)    PAST MEDICAL & SURGICAL HISTORY:  CHF (congestive heart failure)    CAD (coronary artery disease)  Depression    Pleural effusion    History of 2019 novel coronavirus disease (COVID-19)  2020    Hemorrhoids    Bleeding hemorrhoids    Peripheral arterial disease    Claudication    BPH with urinary obstruction    ACC/AHA stage D systolic heart failure  Anticoagulation goal of INR 2.0 to 2.5    Falls    Clavicle fracture    CKD (chronic kidney disease), stage III    Iron deficiency anemia    H/O epistaxis    Vertigo    GI bleed    S/P TVR (tricuspid valve repair)    S/P ventricular assist device    S/P endoscopy    OBJECTIVE:  ICU Vital Signs Last 24 Hrs  T(C): 38.2 (2021 10:00), Max: 38.5 (2021 12:00)  T(F): 100.8 (2021 10:00), Max: 101.3 (2021 12:00)  HR: 65 (2021 10:00) (61 - 69)  BP: --  BP(mean): --  ABP: 105/67 (2021 10:00) (90/54 - 113/64)  ABP(mean): 77 (2021 10:00) (63 - 77)  RR: 20 (2021 10:00) (19 - 35)  SpO2: 99% (2021 10:00) (96% - 100%)       @ 07: @ 07:00  --------------------------------------------------------  IN: 2693.9 mL / OUT: 1415 mL / NET: 1278.9 mL     @ 07: @ 10:49  --------------------------------------------------------  IN: 420.8 mL / OUT: 115 mL / NET: 305.8 mL  PHYSICAL EXAM: Daily   Elderly male  on trach. collar  FI02 is 40% S/P tracheostomy   Daily Weight in k.4 (2021 00:00)  HEENT:     + NCAT  + EOMI  - Conjuctival edema   - Icterus   - Thrush   - ETT  + NGT/OGT  Neck:         + FROM  + JVD  - Nodes - Masses + Mid-line trachea + Tracheostomy  Chest:            normal A-P diameter    Lungs:          + CTA   + Rhonchi    - Rales    - Wheezing + Decreased  LT BS   - Dullness R L  Cardiac:       + S1 + S2    + RRR   - Irregular   - S3  - S4    - Murmurs   - Rub   - Hamman’s sign   Abdomen:    + BS  + Soft + Non-tender - Distended - Organomegaly - PEG .cholecystostomy tube in place  Extremities:   - Cyanosis U/L   - Clubbing  U/L  + LE/UE Edema LUE hematoma   + Capillary refill    + Pulses   Neuro:        - Awake   -  Alert   - Confused   - Lethargic   + Sedated  + Generalized Weakness  Skin:        - Rashes    - Erythema   + Normal incisions   + IV sites intact          HOSPITAL MEDICATIONS: All medications reviewed and analyzed  MEDICATIONS  (STANDING):  amiodarone    Tablet 200 milliGRAM(s) Oral daily  chlorhexidine 0.12% Liquid 15 milliLiter(s) Oral Mucosa every 12 hours  chlorhexidine 2% Cloths 1 Application(s) Topical <User Schedule>  dexmedetomidine Infusion 0.5 MICROgram(s)/kG/Hr (9.81 mL/Hr) IV Continuous <Continuous>  dextrose 50% Injectable 50 milliLiter(s) IV Push every 15 minutes  heparin  Infusion 400 Unit(s)/Hr (12.5 mL/Hr) IV Continuous <Continuous>  Hydromorphone  Injectable 0.5 milliGRAM(s) IV Push once  insulin lispro (ADMELOG) corrective regimen sliding scale   SubCutaneous every 6 hours  pantoprazole  Injectable 40 milliGRAM(s) IV Push every 12 hours  piperacillin/tazobactam IVPB.. 3.375 Gram(s) IV Intermittent every 8 hours  propofol Infusion 20 MICROgram(s)/kG/Min (9.42 mL/Hr) IV Continuous <Continuous>  sodium chloride 0.9% lock flush 3 milliLiter(s) IV Push every 8 hours  sodium chloride 0.9%. 1000 milliLiter(s) (10 mL/Hr) IV Continuous <Continuous>    MEDICATIONS  (PRN):  acetaminophen    Suspension .. 650 milliGRAM(s) Oral every 6 hours PRN Temp greater or equal to 38C (100.4F)    LABS: All Lab data reviewed and analyzed                         10.5   8.51  )-----------( 289      ( 2021 05:18 )             32.7    -    131<L>  |  95<L>  |  10  ----------------------------<  84  4.0   |  24  |  0.50    Ca    9.8      2021 05:18  Phos  4.3     11-09  Mg     1.8         TPro  8.2  /  Alb  3.9  /  TBili  0.6  /  DBili  x   /  AST  27  /  ALT  34  /  AlkPhos  170<H>      Ca    9.3      2021 04:34  Phos  3.4     11-08  Mg     1.5     -    TPro  7.5  /  Alb  3.8  /  TBili  0.4  /  DBili  x   /  AST  23  /  ALT  32  /  AlkPhos  160<H>        Ca    9.6      2021 05:38  Phos  3.3     11-06  Mg     1.5     -    TPro  7.4  /  Alb  3.7  /  TBili  0.4  /  DBili  x   /  AST  31  /  ALT  42  /  AlkPhos  178<H>  11-06                                                                                                                                                                                             PTT - ( 2021 04:52 )  PTT:45.2 sec LIVER FUNCTIONS - ( 2021 00:42 )  Alb: 3.4 g/dL / Pro: 6.7 g/dL / ALK PHOS: 213 U/L / ALT: 15 U/L / AST: 24 U/L / GGT: x           RADIOLOGY: - Reviewed and analyzed  , LVAD HM2, CT scan of abdomen reviewed result noted

## 2021-11-09 NOTE — PROGRESS NOTE ADULT - SUBJECTIVE AND OBJECTIVE BOX
Subjective: Patient seen and examined resting in bed.     Medications:  aspirin  chewable 81 milliGRAM(s) Oral daily  chlorhexidine 2% Cloths 1 Application(s) Topical <User Schedule>  ciprofloxacin     Tablet 500 milliGRAM(s) Oral every 12 hours  guaiFENesin Oral Liquid (Sugar-Free) 200 milliGRAM(s) Oral every 4 hours PRN  hydrALAZINE 20 milliGRAM(s) Oral three times a day  insulin lispro (ADMELOG) corrective regimen sliding scale   SubCutaneous every 6 hours  magnesium oxide 400 milliGRAM(s) Oral every 12 hours  methimazole 10 milliGRAM(s) Oral daily  metoclopramide Injectable 10 milliGRAM(s) IV Push every 8 hours  metoprolol tartrate 12.5 milliGRAM(s) Oral every 8 hours  mirtazapine 7.5 milliGRAM(s) Oral at bedtime  multivitamin 1 Tablet(s) Oral daily  ondansetron Injectable 8 milliGRAM(s) IV Push every 8 hours  pantoprazole  Injectable 40 milliGRAM(s) IV Push every 12 hours  sertraline 100 milliGRAM(s) Oral daily  simethicone 80 milliGRAM(s) Chew every 8 hours PRN  spironolactone 25 milliGRAM(s) Oral daily  thiamine 100 milliGRAM(s) Oral daily  urea Oral Powder 15 Gram(s) Oral daily  vancomycin    Solution 125 milliGRAM(s) Oral every 12 hours    Vitals:  Vital Signs Last 24 Hours  T(C): 36.8 (21 @ 07:29), Max: 36.9 (21 @ 19:30)  HR: 123 (21 @ 07:29) (113 - 127)  BP: 102/74 (21 @ 15:46) (102/74 - 102/74)  RR: 18 (21 @ 07:29) (18 - 20)  SpO2: 100% (21 @ 07:29) (100% - 100%)    Weight in k.4 ( @ 05:56)    I&O's Summary    2021 07:01  -  2021 07:00  --------------------------------------------------------  IN: 660 mL / OUT: 1375 mL / NET: -715 mL        Tele: -130    Physical Exam  General: No distress. Comfortable.  Neck: +trach collar   Chest: Clear to auscultation bilaterally  CV: +VAD hum  Abdomen: Soft, non-distended, non-tender, +keotube, +biliary drain   Neurology: Alert and oriented times three. Sensation intact    LVAD Interrogation  VAD TYPE HM 2  Speed 9200  Flow 5.2  Power 5.6  PI 6.7  Assessment of driveline exit site: driveline dressing c/d/i  Programming changes: no changes made    Labs:                        10.5   8.51  )-----------( 289      ( 2021 05:18 )             32.7         131<L>  |  95<L>  |  10  ----------------------------<  84  4.0   |  24  |  0.50    Ca    9.8      2021 05:18  Phos  4.3       Mg     1.8         TPro  8.2  /  Alb  3.9  /  TBili  0.6  /  DBili  x   /  AST  27  /  ALT  34  /  AlkPhos  170<H>                Lactate Dehydrogenase, Serum: 274 U/L ( @ 05:18)  Lactate Dehydrogenase, Serum: 250 U/L ( @ 04:34)

## 2021-11-09 NOTE — PROGRESS NOTE ADULT - ASSESSMENT
Assessment and Recommendation:   · Assessment	  Assessment and recommendation :  Recurrent Acute hypoxic respiratory Failure  FI02 is 40% S/P tracheostomy  on track. collar   Acute blood loss anemia S/P multiple  blood transfusion post tracheostomy   start Decannulation to cuffless trach.  recurrent  septic shock  weaned off  vasopressin   sepsis   on Cipro tablets   po vancomycin   C-Dificle colitis on po vancomycin   S/P cholecystostomy tube placement by IR    to repeat Abdominal ultra sound  patient is S/P  repeat vacular procedure and SMA stent   AF RVR back to regular sinus Rhythm   Non ischemic cardiomyopathy continue ACE inhibitor and B-Blockers   Stage D systolic heart failure S/P LVAD HM2   MH2 LVAD  with  TV Annuloplasty  Severe peripheral vascular disease   severe hyperglycemia on insulin coverage    Reglan 10 mg for Gastric Motility   hyperthyroidism on Methimazole 10mg TID   critical care polyneuropathy   Anemia of Acute blood Loss   severe protein caloric malnutrition   magnesium supplement keep above 2   Chronic kidney disease stage III  Depressed and withdrawn   on  NG tube feeding and PO trial with pure diet   start decannulate down to Shiley 6 cuffless   GI prophylaxis with PPI

## 2021-11-10 NOTE — SWALLOW FEES ASSESSMENT ADULT - ORAL PHASE
Up to Max uncontrolled spillover to the pyriform sinuses for mildly thick liquids, with no laryngeal residue post swallow up to max spillover to the pyriform sinuses; laryngeal penetration over the epiglottis prior to the swallow, with no laryngeal residue post swallow

## 2021-11-10 NOTE — SWALLOW FEES ASSESSMENT ADULT - DIAGNOSTIC IMPRESSIONS
65 yo male with PMH of stage D NICM s/p HM2 on 9/2017 as DT (due to severe PAD) with TV ring, prior COVID-19 infection 4/2020, recurrent syncope post LVAD s/p ILR, and chronic abdominal pain with prior negative workup was admitted 6/14/21 with symptomatic anemia with Hgb 4.5 in setting of INR 8.8 without hemodynamic instability, with subsequent prolonged, complicated course multiple intubations, trached 6/25, decannulated 9/24, re-intubated 9/26, re-trached 10/4, currently with #6DCT, cleared for speaking valve, but not frequently wearing PMV, as per Pt preference. Pt was assessed for diet upgrade without PMV in place. Pt presents with evidence of an oropharyngeal dysphagia notable for mildly prolonged oral management, uncontrolled A-P spillover to the valleculae and hypopharynx for liquid consistencies, trace to mild pharyngeal residue post swallow, and laryngeal penetration of thin liquids prior to and during the swallow, that appears fully retrieved during the swallow, or upon reswallows.    Disorders: reduced lingual strength/ROM/Rate of motion, suspected reduced tongue to palate contact, suspected reduced BOT to posterior pharyngeal wall contact, suspected mild delay in trigger of the swallow reflex, suspect mildly reduced hyo-laryngeal elevation/excursion, suspected reduced laryngeal closure.

## 2021-11-10 NOTE — PROGRESS NOTE ADULT - SUBJECTIVE AND OBJECTIVE BOX
RICKY JOINT  MRN#: [id number]  Subjective:  Pulmonary progress  : recurrent Acute hypoxic respiratory Failure ,aspiration pneumonia, NICM  ,   65M PMH ACC/AHA stage D HF due to NICM HM2 LVAD , TV annuloplasty ring 17 as destination therapy due to severe peripheral artery disease with significant stenosis  SIADH, Depression, CKD-3 with hyperkalemia, past E. coli UTIs, driveline drainage (21) and COVID-19 (back in 2020)  He was recently seen in clinic where he complained of abdominal pain and dark stools w constipation back in May. He presents to [facility] ER today weakness and fatigue, moderate and + Black stools for three days, on coumadin secondary to warfarin use in the setting of an LVAD. Patient has required transfusions for GIB in the past. Mostly recently back in 2021 pt had anemia with dark stools. No interventions was done at that time. However Last Endoscopy was done in 2020 (negative). Today labs show patient is anemic with H/H of 4.5/16.3,. INR is 8.84 MAP in the 90s, Temp 35.1. He denies any chest pain, shortness of breath, dizziness, abd pain, nausea or vomiting. found to have  rectal bleeding underwent endoscopy ,old blood in the proximal ileum ,  develop sepsis with LL opacity given Antibiotics , Extubated , reintubated , Bronchoscopy on Zosyn for LL pneumonia  and Amiodarone S/P TV Annuloplasty , patient remain intubated on full ventilatory support .S/P multiple units of blood transfusion , remain on full ventilatory support on Precedex and propofol , new central IJ line , diarrhea C diff. +ve on po vancomycin and IV Flagyl,  mildly distended belly , fever start on cefepime 2gm q 8 hrs S/P tracheostomy .  new RT Subclavian central line continue on contact  isolation ,S/P  C-diff antibiotics, no more diarrhea back on full support mechanical ventilator , chest x ray show improvement in LLL air space disease, more awake and responsive on tube feeding no more diarrhea ,  no nausea or vomiting or diarrhea still very weak and tired , back on tube feeding ,still on po vancomycin , getting PT and OT at bed side ,   , no SOB getting stronger , improve muscle tone patient transfer to monitor bed still on contact isolation for C-Difficel colitis on 50% FI02, and change to Suresh  tube feeding still loose stool . H/H drop significantly require blood transfusion , most likely GI bleeding , IV heparin D/C ,  H/H is stable ., patient develop TR sided  pneumothorax require chest tube placement , RT IJ central line  placed , develop fever shaking chills , blood culture positive for serratia marcescens , start on cefepime .the patient  become hypoxic and hypotensive placed on full ventilatory support and Vasopressin , levophed and Dobutamine ,S/P blood transfusion on meropenem and vancomycin ,   , on and  off pressors , occasional agitation on Precedex .S/P IR cholecystostomy tube drainage placement in the RT upper Quadrant , resume anticoagulation chest x ray noted C-PAP trail lasted only for 2 hrs , new RT SC line and D/C RT IJ line , RT pig tail cathter has been removed , tolerating C-PAP trial placed on trach. collar 50% FI02 GI consultant noted on NG tube feeding as tolerated , develop AF RVR S/P  bolus Amiodarone  back to regular sinus Rhythm , Flat Affect depressed , back on tube feeding Vital AF at 60 cc/ hr .still intermittent abdominal pain , no fever saturation is accepted  back on full ventilatory support ,   on methimazole for hyperthyroidism ,  condition is the same ,still C/O Abdominal pain , white count is improving , no chest pain or SOB ,  .placed on Reglan 10 mg TID for gastric motility , depressed , withdrawn. , S/P  mesenteric angiogram , unable to stent SMA S/P 3 units of blood transfusion   RT IJ in place reassessed for stent in the SMA by vascular,  dark stool stable H/H ,surgical opinion for possible Lap cholecystectomy. over night events noted develop respiratory distress, , rectal bleeding, require intubation placed on full ventilatory support , FI02 is 50% also became hemianosmically unstable require triple pressor support with levophed , vasopressin and norsynephrine, pan culture placed  on Vancomycin IV and PO  and Zosyn, sedated with propofol kept NPO , new central line RT and left IJ cathter placed . Diflucan Added .fever of 38.5 weaned off  vasopressin ,   fever adding IV vancomycin and  vancomycin solution  , and Bactrim , S/P  tracheostomy , bleeding receiving blood transfusion on vasopressin , no more bleeding , no fever , more awake and responsive ,tolerating tube feeding , ID and heart failure follow up appreciated , depresses no weaning for now , magnesium is low to give supplement too keep Above 2 , patient S/P  vascular procedure for superior mesenteric artery occlusion S/P 2 stent placement , patient tolerate it very well  , left upper extremities hematoma .vascular follow up appreciated , increase WBC , new RT UPE midline , black tarry stool , very loose R/O recurrent GI bleeding and C-dificile  colitis stable H/H no events over night , diarrhea is improved ,  WBC is improving  , hyponatremia no change , S/P FEEST study result is pending , PO feeding with observation . on Cipro antibiotics  no major over night event  on monitor bed .start decannulation to cuffless trach . on 40% FI02, po feeding   , good saturation , on pure die D/C NG tube feeding , on po feeding no plan for decannulation still significant secretion      (2021 16:57)    PAST MEDICAL & SURGICAL HISTORY:  CHF (congestive heart failure)    CAD (coronary artery disease)  Depression    Pleural effusion    History of 2019 novel coronavirus disease (COVID-19)  2020    Hemorrhoids    Bleeding hemorrhoids    Peripheral arterial disease    Claudication    BPH with urinary obstruction    ACC/AHA stage D systolic heart failure  Anticoagulation goal of INR 2.0 to 2.5    Falls    Clavicle fracture    CKD (chronic kidney disease), stage III    Iron deficiency anemia    H/O epistaxis    Vertigo    GI bleed    S/P TVR (tricuspid valve repair)    S/P ventricular assist device    S/P endoscopy    OBJECTIVE:  ICU Vital Signs Last 24 Hrs  T(C): 38.2 (2021 10:00), Max: 38.5 (2021 12:00)  T(F): 100.8 (2021 10:00), Max: 101.3 (2021 12:00)  HR: 65 (2021 10:00) (61 - 69)  BP: --  BP(mean): --  ABP: 105/67 (2021 10:00) (90/54 - 113/64)  ABP(mean): 77 (2021 10:00) (63 - 77)  RR: 20 (2021 10:00) (19 - 35)  SpO2: 99% (2021 10:00) (96% - 100%)      - @ 07:01  -  - @ 07:00  --------------------------------------------------------  IN: 2693.9 mL / OUT: 1415 mL / NET: 1278.9 mL     @ 07:01   @ 10:49  --------------------------------------------------------  IN: 420.8 mL / OUT: 115 mL / NET: 305.8 mL  PHYSICAL EXAM: Daily   Elderly male  on trach. collar  FI02 is 40% S/P tracheostomy   Daily Weight in k.4 (2021 00:00)  HEENT:     + NCAT  + EOMI  - Conjuctival edema   - Icterus   - Thrush   - ETT  - NGT/OGT  Neck:         + FROM  + JVD  - Nodes - Masses + Mid-line trachea + Tracheostomy  Chest:            normal A-P diameter    Lungs:          + CTA   + Rhonchi    - Rales    - Wheezing + Decreased  LT BS   - Dullness R L  Cardiac:       + S1 + S2    + RRR   - Irregular   - S3  - S4    - Murmurs   - Rub   - Hamman’s sign   Abdomen:    + BS  + Soft + Non-tender - Distended - Organomegaly - PEG .cholecystostomy tube in place  Extremities:   - Cyanosis U/L   - Clubbing  U/L  + LE/UE Edema LUE hematoma   + Capillary refill    + Pulses   Neuro:        - Awake   -  Alert   - Confused   - Lethargic   + Sedated  + Generalized Weakness  Skin:        - Rashes    - Erythema   + Normal incisions   + IV sites intact          HOSPITAL MEDICATIONS: All medications reviewed and analyzed  MEDICATIONS  (STANDING):  amiodarone    Tablet 200 milliGRAM(s) Oral daily  chlorhexidine 0.12% Liquid 15 milliLiter(s) Oral Mucosa every 12 hours  chlorhexidine 2% Cloths 1 Application(s) Topical <User Schedule>  dexmedetomidine Infusion 0.5 MICROgram(s)/kG/Hr (9.81 mL/Hr) IV Continuous <Continuous>  dextrose 50% Injectable 50 milliLiter(s) IV Push every 15 minutes  heparin  Infusion 400 Unit(s)/Hr (12.5 mL/Hr) IV Continuous <Continuous>  Hydromorphone  Injectable 0.5 milliGRAM(s) IV Push once  insulin lispro (ADMELOG) corrective regimen sliding scale   SubCutaneous every 6 hours  pantoprazole  Injectable 40 milliGRAM(s) IV Push every 12 hours  piperacillin/tazobactam IVPB.. 3.375 Gram(s) IV Intermittent every 8 hours  propofol Infusion 20 MICROgram(s)/kG/Min (9.42 mL/Hr) IV Continuous <Continuous>  sodium chloride 0.9% lock flush 3 milliLiter(s) IV Push every 8 hours  sodium chloride 0.9%. 1000 milliLiter(s) (10 mL/Hr) IV Continuous <Continuous>    MEDICATIONS  (PRN):  acetaminophen    Suspension .. 650 milliGRAM(s) Oral every 6 hours PRN Temp greater or equal to 38C (100.4F)    LABS: All Lab data reviewed and analyzed                         9.5    6.02  )-----------( 213      ( 10 Nov 2021 06:37 )  11-10    133<L>  |  97  |  21  ----------------------------<  89  4.1   |  23  |  0.56    Ca    9.6      10 Nov 2021 06:35  Phos  3.5     11-10  Mg     1.8     11-10    TPro  8.2  /  Alb  3.9  /  TBili  0.6  /  DBili  x   /  AST  27  /  ALT  34  /  AlkPhos  170<H>                 29.8   Ca    9.8      2021 05:18  Phos  4.3     11-09  Mg     1.8         TPro  8.2  /  Alb  3.9  /  TBili  0.6  /  DBili  x   /  AST  27  /  ALT  34  /  AlkPhos  170<H>      Ca    9.3      2021 04:34  Phos  3.4     11-08  Mg     1.5     -    TPro  7.5  /  Alb  3.8  /  TBili  0.4  /  DBili  x   /  AST  23  /  ALT  32  /  AlkPhos  160<H>                                                                                                                                                                                          PTT - ( 2021 04:52 )  PTT:45.2 sec LIVER FUNCTIONS - ( 2021 00:42 )  Alb: 3.4 g/dL / Pro: 6.7 g/dL / ALK PHOS: 213 U/L / ALT: 15 U/L / AST: 24 U/L / GGT: x           RADIOLOGY: - Reviewed and analyzed  , LVAD HM2, CT scan of abdomen reviewed result noted

## 2021-11-10 NOTE — SWALLOW FEES ASSESSMENT ADULT - ADDITIONAL RECOMMENDATIONS
Maintain good oral hygiene.   Restorative swallow therapy. Will continue to follow while patient is in-house, as schedule permits.   MD/team Please enter the following as notes to RN: 1) Full assist with meals, 2) Crush meds or provide via alternate source, 3) ALL PO via teaspoon, 4) Provide small single bites and sips at slow rate, 5) Encourage successive swallows for oral and pharyngeal clearance, 6) Alternate food and liquids to aid in oral and pharyngeal clearance, 7) Aspiration precautions. Monitor for s/s aspiration/laryngeal penetration. If noted:  D/C p.o. intake, provide non-oral nutrition/hydration/meds Maintain good oral hygiene.   Restorative swallow therapy. Will continue to follow while patient is in-house, as schedule permits.   Swallow Goals: 1. Pt/family/caregiver will demonstrate understanding and carryover of dysphagia management (safe swallow guidelines, compensatory strategies, dysphagia diet).  2. Pt will complete dysphagia exercises to improve swallow function.  3. Pt will tolerate recommended diet with no overt, clinical s/s of aspiration.

## 2021-11-10 NOTE — PROGRESS NOTE ADULT - ASSESSMENT
67yo male with PMH ACC/AHA stage D HF due to NICM HM2 LVAD , TV annuloplasty ring 9/12/17 as destination therapy due to severe peripheral artery disease with significant stenosis complicated by respiratory failure S/P trach. Pt was decannulated, developed SOB, and underwent subsequent revision tracheostomy. Pt is S/P FEES today for dysphagia. Pt is tolerating speaking valve.

## 2021-11-10 NOTE — PROGRESS NOTE ADULT - ASSESSMENT
67 YO M with a history of stage D NICM s/p HM2 on 9/2017 as DT (due to severe PAD) with TV ring, prior COVID-19 infection 4/2020, recurrent syncope post LVAD s/p ILR, and chronic abdominal pain with prior negative workup who was admitted 6/14/21 with symptomatic anemia with Hgb 4.5 in setting of INR 8.8 without hemodynamic instability and has since had a protracted hospitalization. He was transfused and underwent VCE which showed active bleeding in the mid small bowel but subsequent enteroscopy 6/15 did not reveal any active bleeding. He acutely decompensated after procedure with fever/hypertension, low flow alarms, and pulmonary infiltrate with hypoxia requiring intubation from probable aspiration PNA. He was unable to extubated and has since undergone tracheostomy but tolerating persistent trach collar and nearing ability for decannulation. 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 He underwent angiogram on 9/10, stent was unable to be placed and course was then complicated by RP bleed. He continued to have persistent leukocytosis and febrile episodes and was noted to have positive sputum culture for serratia marcescens and completed his course of abx. He was initially decannulated on 9/23. Recently he started having multiples of melena, emesis and went into acte respiratory distress and was ultimately re-intubated on 9/26.     He had blood cultures positive for enterobacter cloacae/serratia and sputum positive for stenotrophomonas remains on IV antibiotics. He is s/p trach with ENT on 10/4. He underwent SMA stent x 2 on 10/18. His nausea and vomiting has improved, and he is now tolerating PO diet along with tube feeds. His DNR/DNI has been rescinded. Will continue to advance diet as tolerated, with goals to remove NG tube and decannulate. Goal will be discharged to [facility] when stable.

## 2021-11-10 NOTE — PROGRESS NOTE ADULT - SUBJECTIVE AND OBJECTIVE BOX
ENT ISSUE/POD: Dysphagia    HPI: Patient is a 66 year old male with PMH of stage D NICM s/p HM2 on 9/2017 as DT (due to severe PAD) with TV ring, prior COVID-19 infection 4/2020, recurrent syncope post LVAD s/p ILR, and chronic abdominal pain with prior negative workup who was admitted 6/14/21 with symptomatic anemia with Hgb 4.5 in setting of INR 8.8 without hemodynamic instability and has since had a prolonged hospital course. trached 10/4, currently with #6DCT with speaking valve. ENT was called for trach change. Pt denies SOB, sore throat.           PAST MEDICAL & SURGICAL HISTORY:  CHF (congestive heart failure)    CAD (coronary artery disease)    Depression    Pleural effusion    History of 2019 novel coronavirus disease (COVID-19)  april 2020    Hemorrhoids    Bleeding hemorrhoids    Peripheral arterial disease    Claudication    BPH with urinary obstruction    ACC/AHA stage D systolic heart failure    Anticoagulation goal of INR 2.0 to 2.5    Falls    Clavicle fracture    CKD (chronic kidney disease), stage III    Iron deficiency anemia    H/O epistaxis    Vertigo    GI bleed    S/P TVR (tricuspid valve repair)    S/P ventricular assist device    S/P endoscopy      Allergies    Amiodarone Hydrochloride (Other (Severe))    Intolerances      MEDICATIONS  (STANDING):  aspirin  chewable 81 milliGRAM(s) Oral daily  chlorhexidine 2% Cloths 1 Application(s) Topical <User Schedule>  ciprofloxacin     Tablet 500 milliGRAM(s) Oral every 12 hours  hydrALAZINE 25 milliGRAM(s) Oral every 8 hours  insulin lispro (ADMELOG) corrective regimen sliding scale   SubCutaneous every 6 hours  magnesium oxide 400 milliGRAM(s) Oral every 12 hours  methimazole 10 milliGRAM(s) Oral daily  metoclopramide Injectable 10 milliGRAM(s) IV Push every 8 hours  metoprolol succinate ER 50 milliGRAM(s) Oral daily  mirtazapine 7.5 milliGRAM(s) Oral at bedtime  pantoprazole    Tablet 40 milliGRAM(s) Oral two times a day  sertraline 100 milliGRAM(s) Oral daily  spironolactone 25 milliGRAM(s) Oral daily  urea Oral Powder 15 Gram(s) Oral daily  vancomycin    Solution 125 milliGRAM(s) Oral every 12 hours    MEDICATIONS  (PRN):  ondansetron Injectable 4 milliGRAM(s) IV Push every 8 hours PRN Nausea and/or Vomiting      Social History: see consult    Family history: see consult    ROS:   ENT: all negative except as noted in HPI   Pulm: denies SOB, cough, hemoptysis  Neuro: denies numbness/tingling, loss of sensation  Endo: denies heat/cold intolerance, excessive sweating      Vital Signs Last 24 Hrs  T(C): 36.3 (10 Nov 2021 08:00), Max: 36.6 (09 Nov 2021 17:00)  T(F): 97.4 (10 Nov 2021 08:00), Max: 97.9 (09 Nov 2021 21:09)  HR: 110 (10 Nov 2021 12:00) (103 - 131)  BP: --  BP(mean): 84 (10 Nov 2021 12:00) (82 - 88)  RR: 18 (10 Nov 2021 12:00) (17 - 18)  SpO2: 100% (10 Nov 2021 12:00) (97% - 100%)                          9.5    6.02  )-----------( 213      ( 10 Nov 2021 06:37 )             29.8    11-10    133<L>  |  97  |  21  ----------------------------<  89  4.1   |  23  |  0.56    Ca    9.6      10 Nov 2021 06:35  Phos  3.5     11-10  Mg     1.8     11-10    TPro  8.2  /  Alb  3.9  /  TBili  0.6  /  DBili  x   /  AST  27  /  ALT  34  /  AlkPhos  170<H>  11-09       PHYSICAL EXAM:  Gen: NAD  Skin: No rashes, bruises, or lesions  Head: Normocephalic, Atraumatic  Face: no edema, erythema, or fluctuance. Parotid glands soft without mass  Eyes: no scleral injection  Nose: Nares bilaterally patent, no discharge  Mouth: No Stridor / Drooling / Trismus.  Mucosa moist, tongue/uvula midline, oropharynx clear  Neck: #6 shiley in place with speaking valve. Flat, supple, no lymphadenopathy, trachea midline, no masses  Lymphatic: No lymphadenopathy  Resp: on trach collar   Neuro: facial nerve intact, no facial droop    Reason for Laryngoscopy:    Patient was unable to cooperate with mirror.  Small glottic gap. Nasopharynx, oropharynx, and hypopharynx clear, no bleeding. Tongue base, posterior pharyngeal wall, vallecula, epiglottis, and subglottis appear normal. No erythema, edema, pooling of secretions, masses or lesions. Airway patent, no foreign body visualized. No glottic/supraglottic edema. True vocal cords, arytenoids, vestibular folds, ventricles, pyriform sinuses, and aryepiglottic folds appear normal bilaterally.

## 2021-11-10 NOTE — SWALLOW FEES ASSESSMENT ADULT - RECOMMENDED FEEDING/EATING TECHNIQUES
maintain upright posture during/after eating for 30 mins/oral hygiene/position upright (90 degrees)/small sips/bites
maintain upright posture during/after eating for 30 mins/no straws/oral hygiene/position upright (90 degrees)/small sips/bites

## 2021-11-10 NOTE — PROGRESS NOTE ADULT - PROBLEM SELECTOR PLAN 1
- ACC/AHA stage D systolic heart failure s/p LVAD   - con't Toprol XL 50 mg QD   - con't hydralazine 25mg PO Q8.  - con't Spironolactone 25mg PO Q24  - Encourage ambulation, PT.

## 2021-11-10 NOTE — SWALLOW FEES ASSESSMENT ADULT - ROSENBEK'S PENETRATION ASPIRATION SCALE
(2) material enters airway, remains above the vocal cords, no residue remains (penetration) (1) no aspiration, material does not enter airway

## 2021-11-10 NOTE — PROGRESS NOTE ADULT - SUBJECTIVE AND OBJECTIVE BOX
VITAL SIGNS    Telemetry:    -110  Vital Signs Last 24 Hrs  T(C): 36.3 (11-10-21 @ 08:00), Max: 36.6 (21 @ 13:00)  T(F): 97.4 (11-10-21 @ 08:00), Max: 97.9 (21 @ 21:09)  HR: 131 (11-10-21 @ 08:00) (103 - 131)  BP: -- map 80's  RR: 17 (11-10-21 @ 08:00) (17 - 18)  SpO2: 97% (11-10-21 @ 08:00) (97% - 100%)                    @ 07:01  -  11-10 @ 07:00  --------------------------------------------------------  IN: 460 mL / OUT: 760 mL / NET: -300 mL          Daily     Daily Weight in k.1 (10 Nov 2021 08:09)            CAPILLARY BLOOD GLUCOSE      POCT Blood Glucose.: 106 mg/dL (10 Nov 2021 00:14)  POCT Blood Glucose.: 172 mg/dL (2021 16:42)  POCT Blood Glucose.: 122 mg/dL (2021 11:37)              Pacing Wires        Coumadin    [ ] YES          [ x ]      NO                                   PHYSICAL EXAM    Neurology: alert and oriented x 3, nonfocal, no gross deficits  CV : s1 s2 RRR  Trach cdi  Sternal Wound :  CDI , Stable  Lungs: cta  rlq driveline cdi  R flank drain  Abdomen: soft, nontender, nondistended, positive bowel sounds, last bowel movement                        :    voiding        Extremities:   -   edema   /  -   calve tenderness ,              aspirin  chewable 81 milliGRAM(s) Oral daily  chlorhexidine 2% Cloths 1 Application(s) Topical <User Schedule>  ciprofloxacin     Tablet 500 milliGRAM(s) Oral every 12 hours  hydrALAZINE 25 milliGRAM(s) Oral every 8 hours  insulin lispro (ADMELOG) corrective regimen sliding scale   SubCutaneous every 6 hours  magnesium oxide 400 milliGRAM(s) Oral every 12 hours  methimazole 10 milliGRAM(s) Oral daily  metoclopramide Injectable 10 milliGRAM(s) IV Push every 8 hours  metoprolol succinate ER 50 milliGRAM(s) Oral daily  mirtazapine 7.5 milliGRAM(s) Oral at bedtime  ondansetron Injectable 4 milliGRAM(s) IV Push every 8 hours PRN  pantoprazole    Tablet 40 milliGRAM(s) Oral two times a day  sertraline 100 milliGRAM(s) Oral daily  spironolactone 25 milliGRAM(s) Oral daily  urea Oral Powder 15 Gram(s) Oral daily  vancomycin    Solution 125 milliGRAM(s) Oral every 12 hours                    Physical Therapy Rec:   Home  [  ]   Home w/ PT  [  ]  Rehab  [  ]  Discussed with Cardiothoracic Team at AM rounds.

## 2021-11-10 NOTE — SWALLOW FEES ASSESSMENT ADULT - SPECIFY REASON(S)
to instrumentally assess swallow safety/function; r/o dysphagia.
to instrumentally assess swallow safety/function; assess for candidacy for diet upgrade.

## 2021-11-10 NOTE — PROGRESS NOTE ADULT - ASSESSMENT
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 coli UTIs, driveline drainage (1/7/21) and COVID-19 (back in April 2020)  He was recently seen in clinic where he complained of abdominal pain and dark stools w constipation back in May. He presents to [facility] ER today weakness and fatigue, moderate and + Black stools for three days, on coumadin secondary to warfarin use in the setting of an LVAD. Patient has required transfusions for GIB in the past. Mostly recently back in jan 2021 pt had anemia with dark stools. No interventions was done at that time. However Last Endoscopy was done in July 2020 (negative). Today labs show patient is anemic with H/H of 4.5/16.3,. INR is 8.84 MAP in the 90s, Temp 35.1. He denies any chest pain, shortness of breath, dizziness, abd pain, nausea or vomiting.       (14 Jun 2021 16:57)  Surgery/Hospital Course:  6/14 admit for melena w/ anemia, INR 8.84   6/15 Capsul study (+) for small bowel bleed, balloon endoscopy (old blood in prox ileum); post EGD - septic w/ L opacity, re-intubated for concern for aspiration, TTE (Mod MR, decrease biV w/ interventricular septum boweing towards R)  6/17 bronch   6/20 +C Diff   6/22 CT C/A/P: Fluid filled colon which may be 2/2 rapid transit. Small bilateral pleffs with associates. Compressive atelectasis New ISABELLE & LLL  parenchymal opacities, suspicious for pneumonia. Moderate stenosis in the proximal superior mesenteric artery.   6/25 #8 Shiley trach at bedside   7/1 LVAD speed increased to 9200  7/2 Bronch  7/20 TC since 7/7. Patient transferred to SDU.   7/23 INR today 2.64.  H&H 7.3/24 this AM.  Will repeat CBC at noon, and will send stool guaiac Patient with persistent abdominal tenderness, rate of tube feeds decreased.  No nausea/vomiting.    7/24 INR today 2.4. H&H 9.1/28.6 low flow overnight /N&V, refusing Tube feeds on D5 1/2 normal  @50 cc/hr  7/25 INR 2.69  H&H 7.7/25.1 refusing Tube feeds on D5 1/2 normal  @50 cc/hr. This am + BM Melena Dr Oneill HF  aware- PRBC x1  GI team consulted -  NPO  plan on study in am-  D/w Dr Cadet Patient  to return to CTU for further management; 1PRBCS   7/27 Post op INR 2.2 today.  No bleeding. BC + for SM.  Pt is hypotensive requiring pressor and inotropic support.  ID follow up today on Cefepime will follow.  7/26 R PTC for PTX   7/28 CT C/A/P: sub q emphysema in R chest wall, GGO RUL, small ascites CTH negative; Abd US: GB thickening, pericholecystic fluid    7/30 perc choley  7/31 Perchole drain in place continues to drain total output overnight 133.  Fever today 38.8   8/1 duplex LE negative   8/7 Patient with persistent abdominal pain, refusing tube feeds and medications, Psych consulted  8/13 CTA A/P ordered to r/o mesenteric ischemia 2/2 persistent anorexia, nausea, vomiting. Revealed:  Evaluation of the mesenteric vessels is limited by streak artifact from LVAD. There appears to be severe stenosis of the proximal SMA; abdominal mesenteric doppler is recommended for further evaluation. 2.  No small bowel findings to suggest acute mesenteric ischemia. 3.  Focal dissections involving the right and left common iliac arteries.  8/15: Cultures resulted BC 1/2 +GPC in clusters, SC enterobacter; mesenteric duplex: borderline stenosis of proximal SMA  8/27: CT C/A/P noncon: Nondular opacities in R lung apex w/cavitation, abd nl  8/31:  Continue current care, treatment of thyrotoxicosis with medications as per endocrine, d/c ABX as per team.   9/8 RUQ sono: Contracted gallbladder with cholecystostomy tube in place.  9/10 failed SMA stent, c/b RP bleed s/p 3U PRCB  9/12 CTA Abd/Pelvis L RP hematoma   9/24 Trach decannulated   9/26 intubated fro resp distress for increased WOB, LL pneumonia; CT C/A/P: progressive ISABELLE opacities and L consolidations, multifocal pneumonia   9/30 TTE (EF 25%, decreased RV, mod MR)   10/3 VT -> Lidocaine gtt   10/4 Trach size 6 DCT cuff  10/5 CT abd (neg for intra-abd abcess, severe stenosis of prox SMA and b/l iliac arteries)  10/18 SMA Stent   10/23 Emend for nausea   10/24 +Occult  10/29 FEES, RUQ ascites and pericholecystic fluid   Theckened liqwuids puree diet- calorie ct    Today:  Ambulation, TC, and PO diet as tolerated.  Day 2 of calorie count  Discussions yesterday as to discharge planning, agreeable to go to [facility] however will need to be decannulated and NGT removed.  Patient candidate for SDU.   11/4 Transferred to sdu.  Must be oob - chair for all meals.  Eventual change to uncuffed trach.  11/5 VSS; rsr/ st ; changed trach to #6 shiley cuffless this am by ENT- tolerated well; continue nutritional support with kaofeed; h/h stable 8/26 today   DNR rescinded today as per CHF/ LVAD team   11/6 VSS: pt tolerating supervised po diet; shantelle count in progress; trach care/suctioning q 3 hours; increase hydral 20 q8 as per CHF/LVAD team  11/7   OOB to chair,   vss   keofeed w/ shantelle count    dawn drain    ald 25 qd  added by HF,  no LVAD alarms  11/8 sluggish today, refusing meds.    Plan to hold TF and increase PO intake. If adequate intake plan to d/c ngt  11/9   Cont off TF, calorie count .  11/10 The patient is non compliant today, not stating reasons why  Secretions , suction q2-3  Calorie count, remains off TF

## 2021-11-10 NOTE — SWALLOW FEES ASSESSMENT ADULT - RECOMMENDED CONSISTENCY
Easy to chew texture diet with Thin liquids via SMALL SINGLE SIPS  MD/team Please enter the following as notes to RN: 1) Full supervision with meals, 2) Meds whole in applesauce, 3) Small single bites and sips at slow rate, 4) Encourage successive swallows for oral and pharyngeal clearance, 5) Alternate food and liquids to aid in oral and pharyngeal clearance, 6) Aspiration precautions. Monitor for s/s aspiration/laryngeal penetration. If noted:  D/C p.o. intake, provide non-oral nutrition/hydration/meds.
Pureed texture diet with Mildly Thick Liquids, SMALL SINGLE SIPS, NO STRAWS  MD/team Please enter the following as notes to RN: 1) Full assist with meals, 2) Crush meds or provide via alternate source, 3) Provide small single bites and sips at slow rate, 4) Encourage successive swallows for oral and pharyngeal clearance, 5) Alternate food and liquids to aid in oral and pharyngeal clearance, 6) Aspiration precautions. Monitor for s/s aspiration/laryngeal penetration. If noted:  D/C p.o. intake, provide non-oral nutrition/hydration/meds

## 2021-11-10 NOTE — PROGRESS NOTE ADULT - PROBLEM SELECTOR PLAN 3
- Has been transfused multiple times throughout this admission, last transfusion was on 10/23  - Hb currently stable, on ASA 81mg Q24.  - con't Protonix 40 mg PO BID

## 2021-11-10 NOTE — SWALLOW FEES ASSESSMENT ADULT - NS SWALLOW FEES REC ASPIR MON
Strict aspiration and reflux precautions!/change of breathing pattern/cough/gurgly voice/fever/pneumonia/throat clearing/upper respiratory infection Monitor for s/s aspiration/laryngeal penetration. If noted:  D/C p.o. intake, provide non-oral nutrition/hydration/meds, and contact this service @ [phone]/change of breathing pattern/cough/gurgly voice/fever/pneumonia/throat clearing/upper respiratory infection

## 2021-11-10 NOTE — SWALLOW FEES ASSESSMENT ADULT - SLP GENERAL OBSERVATIONS
Pt seen for FEES in conjunction with ENT, PMV in place. Pt seen at bedside, awake and alert, oriented, verbally responsive, following directions for the purposes of the exam. Pt seen with assistance of ENT CLAUDIA Blood, who was able to interpret for [place], as needed.
Pt seen for FEES in conjunction with ENT. ENT CLAUDIA Blood provided interpretation for [facility] [place], as needed. Pt seen at bedside, awake and alert, oriented, verbally responsive, following directions for the purposes of the exam.

## 2021-11-10 NOTE — PROGRESS NOTE ADULT - ASSESSMENT
Assessment and Recommendation:   · Assessment	  Assessment and recommendation :  Recurrent Acute hypoxic respiratory Failure  FI02 is 40% S/P tracheostomy  on track. collar   Acute blood loss anemia S/P multiple  blood transfusion post tracheostomy   start Decannulation to cuffless trach.  recurrent  septic shock  weaned off  vasopressin   sepsis   on Cipro tablets   po vancomycin   C-Dificle colitis on po vancomycin   S/P cholecystostomy tube placement by IR    to repeat Abdominal ultra sound  patient is S/P  repeat vacular procedure and SMA stent   AF RVR back to regular sinus Rhythm   Non ischemic cardiomyopathy continue ACE inhibitor and B-Blockers   Stage D systolic heart failure S/P LVAD HM2   MH2 LVAD  with  TV Annuloplasty  Severe peripheral vascular disease   severe hyperglycemia on insulin coverage    Reglan 10 mg for Gastric Motility   hyperthyroidism on Methimazole 10mg TID   critical care polyneuropathy   Anemia of Acute blood Loss   severe protein caloric malnutrition   magnesium supplement keep above 2   Chronic kidney disease stage III  Depressed and withdrawn   on   PO trial with pure diet   start decannulate down to Shiley 6 cuffless   GI prophylaxis with PPI

## 2021-11-10 NOTE — PROGRESS NOTE ADULT - SUBJECTIVE AND OBJECTIVE BOX
Patient seen and examined at bedside.    Overnight Events: no events, issues or complaints    Review of Systems:  REVIEW OF SYSTEMS:  CONSTITUTIONAL: No weakness, fevers or chills  EYES/ENT: No visual changes;  No dysphagia  NECK: No pain or stiffness  RESPIRATORY: No cough, wheezing, hemoptysis; No shortness of breath  CARDIOVASCULAR: No chest pain or palpitations; No lower extremity edema  GASTROINTESTINAL: No abdominal or epigastric pain. No nausea, vomiting, or hematemesis; No diarrhea or constipation. No melena or hematochezia.  BACK: No back pain  GENITOURINARY: No dysuria, frequency or hematuria  NEUROLOGICAL: No numbness or weakness  SKIN: No itching, burning, rashes, or lesions   All other review of systems is negative unless indicated above.    [x ] All other systems negative  [ ] Unable to assess ROS due to    Current Meds:  aspirin  chewable 81 milliGRAM(s) Oral daily  chlorhexidine 2% Cloths 1 Application(s) Topical <User Schedule>  ciprofloxacin     Tablet 500 milliGRAM(s) Oral every 12 hours  hydrALAZINE 25 milliGRAM(s) Oral every 8 hours  insulin lispro (ADMELOG) corrective regimen sliding scale   SubCutaneous every 6 hours  magnesium oxide 400 milliGRAM(s) Oral every 12 hours  methimazole 10 milliGRAM(s) Oral daily  metoclopramide Injectable 10 milliGRAM(s) IV Push every 8 hours  metoprolol succinate ER 50 milliGRAM(s) Oral daily  mirtazapine 7.5 milliGRAM(s) Oral at bedtime  ondansetron Injectable 4 milliGRAM(s) IV Push every 8 hours PRN  pantoprazole    Tablet 40 milliGRAM(s) Oral two times a day  sertraline 100 milliGRAM(s) Oral daily  spironolactone 25 milliGRAM(s) Oral daily  urea Oral Powder 15 Gram(s) Oral daily  vancomycin    Solution 125 milliGRAM(s) Oral every 12 hours      PAST MEDICAL & SURGICAL HISTORY:  CHF (congestive heart failure)    CAD (coronary artery disease)    Depression    Pleural effusion    History of 2019 novel coronavirus disease (COVID-19)  april 2020    Hemorrhoids    Bleeding hemorrhoids    Peripheral arterial disease    Claudication    BPH with urinary obstruction    ACC/AHA stage D systolic heart failure    Anticoagulation goal of INR 2.0 to 2.5    Falls    Clavicle fracture    CKD (chronic kidney disease), stage III    Iron deficiency anemia    H/O epistaxis    Vertigo    GI bleed    S/P TVR (tricuspid valve repair)    S/P ventricular assist device    S/P endoscopy        Vitals:  T(F): 97.4 (11-10), Max: 97.9 (11-09)  HR: 110 (11-10) (103 - 131)  BP: --  RR: 18 (11-10)  SpO2: 100% (11-10)  I&O's Summary    09 Nov 2021 07:01  -  10 Nov 2021 07:00  --------------------------------------------------------  IN: 460 mL / OUT: 760 mL / NET: -300 mL    10 Nov 2021 07:01  -  10 Nov 2021 12:42  --------------------------------------------------------  IN: 0 mL / OUT: 400 mL / NET: -400 mL        Physical Exam:  Physical Exam  General: No distress. Comfortable.  Neck: +trach collar   Chest: Clear to auscultation bilaterally  CV: +VAD hum  Abdomen: Soft, non-distended, non-tender, +keotube, +biliary drain   Neurology: Alert and oriented times three. Sensation intact    LVAD Interrogation  VAD TYPE HM 2  Speed 9200  Flow 4.1  Power 5  PI 6.4  Assessment of driveline exit site: driveline dressing c/d/i  Programming changes: no changes made                        9.5    6.02  )-----------( 213      ( 10 Nov 2021 06:37 )             29.8     11-10    133<L>  |  97  |  21  ----------------------------<  89  4.1   |  23  |  0.56    Ca    9.6      10 Nov 2021 06:35  Phos  3.5     11-10  Mg     1.8     11-10    TPro  8.2  /  Alb  3.9  /  TBili  0.6  /  DBili  x   /  AST  27  /  ALT  34  /  AlkPhos  170<H>  11-09

## 2021-11-10 NOTE — PROGRESS NOTE ADULT - PROBLEM SELECTOR PLAN 7
- currently tolerating PO pureed diet along with tube feeds, will continue to advance diet as tolerated  - remains on calorie count   - will remove NG tube, after completion of calorie count, and assessment of adequate nutrition.  - Encourage PO diet.  - SLP following and will undergo FEES study later this week (date pending)

## 2021-11-10 NOTE — SWALLOW FEES ASSESSMENT ADULT - PHARYNGEAL PHASE COMMENTS
Swallow trigger with bolus in the lateral channels, or pyriform sinuses  Trace residual over the interarytenoid space that does not appear to descend into the laryngeal vestibule with sequential cup sips, and clears with reswallow Trace to mild vallecular residue, and pyriform sinus residue

## 2021-11-11 NOTE — PROGRESS NOTE ADULT - PROBLEM SELECTOR PLAN 1
HF follow up   added ald 25 qd  mAINTAIN CURRENT SPEED.  asa 81 qd   continue lopressor 12.5 mg po bid   rt upper extremity  midline for access   hydral 20 q8 as per CHF/LVAD team   rehab when stable

## 2021-11-11 NOTE — PROGRESS NOTE ADULT - PROBLEM SELECTOR PROBLEM 4
August 13, 2021       Bethany Brody MD  [address] [facility] [address]  Via In Basket      Patient: Clifford Richmond   YOB: 1948   Date of Visit: 8/13/2021       Dear Dr. Brody:    Thank you for referring Clifford Richmond to me for evaluation. Below are my notes for this visit with him.    If you have questions, please do not hesitate to call me. I look forward to following your patient along with you.      Sincerely,        Walt Sykes MD        CC: No Recipients  Walt Sykes MD  8/13/2021  9:36 AM  Signed    Subjective       Clifford Richmond  is a 72 year old  y/o male   that returns to clinic for Follow-up (last office visit 03/08/2021 CAD S/P percutaneous coronary angioplasty)    and ongoing management of hischronic diagnoses.     Time spent with patient: 30 minutes.    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 and stage 2 chronic kidney disease (CMS/HCC)   • Chronic GERD   • Hepatitis C   • Chronic systolic heart failure (CMS/HCC)   • Hiatal hernia with GERD   • Cocaine abuse, episodic (CMS/HCC)   • Tobacco use disorder   • Heart failure managed at heart failure clinic (CMS/[facility])   • Chronic obstructive pulmonary disease with acute exacerbation (CMS/HCC)   • Cocaine dependence with withdrawal (CMS/HCC)   • Hydrocele, right   • Right inguinal hernia   • Closed fracture of right upper extremity with routine healing   • Weight loss   • Type 2 diabetes mellitus with hyperglycemia (CMS/HCC)   • Herniation of left lung   • Elevated troponin   • Centrilobular emphysema (CMS/HCC)   • Hospital discharge follow-up   • S/P carpal tunnel release     Mr. Richmond comes to clinic today without any difficulties.  He has been following up with heart failure clinic and has  been taking his medications with some difficulties due to forgetting to take his medications.     Otherwise no active issues problems or concerns.      Review of Systems   Constitutional: Negative for diaphoresis, fever and night sweats.   HENT: Negative for congestion, ear discharge, ear pain, nosebleeds and sore throat.    Eyes: Negative for blurred vision, discharge, pain and photophobia.   Cardiovascular: Positive for leg swelling. Negative for chest pain, claudication and dyspnea on exertion.   Respiratory: Negative for cough and hemoptysis.    Endocrine: Negative for polydipsia and polyuria.   Hematologic/Lymphatic: Negative for bleeding problem.   Skin: Positive for dry skin and rash. Negative for color change, suspicious lesions and unusual hair distribution.   Musculoskeletal: Negative for falls, gout and muscle weakness.   Gastrointestinal: Negative for bloating, anorexia, melena, nausea and vomiting.   Genitourinary: Negative for frequency, hematuria, non-menstrual bleeding and urgency.   Neurological: Negative for difficulty with concentration, loss of balance, seizures and vertigo.   Psychiatric/Behavioral: Negative for hallucinations, suicidal ideas and thoughts of violence.   Allergic/Immunologic: Negative for hives.       Medications/ Allergies:    Current Medications    ALBUTEROL (PROAIR HFA) 108 (90 BASE) MCG/ACT INHALER    Inhale 1 puff into the lungs every 6 hours as needed for Shortness of Breath or Wheezing. Indications: Chronic Obstructive Lung Disease    ALBUTEROL (VENTOLIN) (2.5 MG/3ML) 0.083% NEBULIZER SOLUTION    Take 3 mLs by nebulization 4 times daily. For wheezing or shortness of breath.    ALBUTEROL (VENTOLIN) (2.5 MG/3ML) 0.083% NEBULIZER SOLUTION    Take 3 mLs by nebulization every 6 hours as needed for Wheezing.    ASPIRIN (ASPIRIN 81) 81 MG EC TABLET    Take 1 tablet by mouth daily.    ATORVASTATIN (LIPITOR) 20 MG TABLET    Take 1 tablet by mouth daily.    DOCUSATE SODIUM (COLACE)  100 MG CAPSULE    Take 1 capsule by mouth daily as needed for Constipation.    EMPAGLIFLOZIN (JARDIANCE) 10 MG TABLET    Take 1 tablet by mouth daily (before breakfast).    HYDROCODONE-ACETAMINOPHEN (NORCO) 5-325 MG PER TABLET    Take 1 tablet by mouth every 4 to 6 hours as needed for Pain.    KETOCONAZOLE (NIZORAL) 2 % CREAM    Apply 1 application externally 2 times a day to both feettoenails    LIDOCAINE (XYLOCAINE) 5 % OINTMENT    APPLY 1  APPLICATION TO AFFECTED AREA TWICE A DAY AS NEEDED FOR PAIN    LOPERAMIDE (IMODIUM) 2 MG CAPSULE    Take 2 mg by mouth 4 times daily as needed.    METOPROLOL SUCCINATE (TOPROL-XL) 50 MG 24 HR TABLET    Take 1 tablet by mouth daily.    RESPIRATORY THERAPY SUPPLIES (NEBULIZER/TUBING/MOUTHPIECE) KIT    Use nebulizer at least 8 hours as needed.    SACUBITRIL-VALSARTAN (ENTRESTO)  MG PER TABLET    Take 1 tablet by mouth every 12 hours to strengthen the heart.    SPIRONOLACTONE (ALDACTONE) 25 MG TABLET    Take 1 tablet by mouth daily. To strengthen the heart (heart failure).    TAMSULOSIN (FLOMAX) 0.4 MG CAP    Take 1 capsule by mouth daily. Indications: Benign Enlargement of Prostate    TORSEMIDE (DEMADEX) 10 MG TABLET    Take 1 tablet by mouth daily.    UMECLIDINIUM-VILANTEROL (ANORO ELLIPTA) 62.5-25 MCG/INH INHALER    Inhale 1 puff into the lungs daily.       ALLERGIES:   Allergen Reactions   • Lisinopril Cough         Objective   /70   Pulse 60   Ht 5' 6\" (1.676 m)   Wt 64.9 kg (143 lb)   BMI 23.08 kg/m²   BSA 1.73 m²    Physical Exam   Constitutional: No distress.   HENT:   Mouth/Throat: Oropharynx is clear.   Eyes: Conjunctivae are normal.   Cardiovascular: Normal rate and normal pulses.   Murmur heard.  Pulmonary/Chest: No stridor. He has no wheezes. He exhibits no tenderness.   Abdominal: Bowel sounds are normal. There is no abdominal tenderness.   Musculoskeletal:         General: No tenderness or deformity.   Neurological: He is alert.   Skin: No plethora.  Nails show no clubbing.   Neck with distended veins.           MEDICAL DECISION MAKING    Laboratory Data:  Recent Labs   Lab 03/25/21  1508   Cholesterol 123   HDL 58   Triglycerides 41   CALCLDL 57   Non-HDL Cholesterol 65       Recent Labs   Lab 07/14/21  0558 10/14/20  1132   Sodium 140 138   Chloride 106 103   BUN 16 23*   GFR Estimate, African American  --  78   BUN/Creatinine Ratio  --  21   BUN/ Creatinine Ratio 16  --    Potassium 3.7 4.4   Glucose 93 79   Creatinine 1.03 1.10   GFR Estimate, Non   --  68   CALCIUM  --  8.8   Calcium 8.8  --    TSH 0.686 1.557          Hemoglobin A1C (%)   Date Value   03/25/2021 6.2 (H)   .    Recent Labs   Lab 07/14/21  0558 07/13/21  0524 07/12/21  0751   WBC 3.6* 2.6* 4.7   RBC 4.34* 4.31* 4.85   HGB 12.0* 12.0* 13.6   HCT 35.5* 35.5* 40.1   MCV 81.8 82.4 82.7   MCHC 33.8 33.8 33.9   RDW-CV 15.9* 16.3* 16.4*    150 165   Lymphocytes, Percent 34 36 27       Cardiac data(including contrasted studies):  Encounter Date: 07/12/21   Electrocardiogram 12-Lead   Result Value    Ventricular Rate EKG/Min (BPM) 67    Atrial Rate (BPM) 68    OH-Interval (MSEC) 128    QRS-Interval (MSEC) 88    QT-Interval (MSEC) 396    QTc 418    P Axis (Degrees) 67    R Axis (Degrees) 75    T Axis (Degrees) 109    REPORT TEXT      Normal sinus rhythm  Possible  Left atrial enlargement  Anteroseptal infarct  (cited on or before  16-DEC-2020)  ST And  T wave abnormality, consider lateral ischemia  Abnormal ECG  When compared with ECG of  07-JUN-2021 14:49,  premature atrial complexes  are no longer  present  Confirmed by VINCE MCGOWAN MD ([id number]) on 7/16/2021 1:40:21 PM     No results found for this or any previous visit.  Xr [facility] Procedure    Assessment     Problem List Items Addressed This Visit        Cardiac and Vasculature    AICD (automatic cardioverter/defibrillator) present (Chronic)     Patient reports out of network AICD EP physicians and requesting to follow up  with [facility] for ongoing device checks.          Chronic systolic heart failure (CMS/[facility]) (Chronic)     NYHA II-III symtpoms  Stable  Recent hospitalization due to drug usage  Recommend cessation  Continue current meds.            Heart failure managed at heart failure clinic (CMS/[facility]) (Chronic)     Continue to follow with HF clinic.            Other Visit Diagnoses     Cardiomyopathy, unspecified type (CMS/[facility])    -  Primary    Relevant Orders    CARDIAC DEVICE CHECK - IN CLINIC           No follow-ups on file.             Walt Sykes MD    Orders Placed This Encounter   • Cardiac Device Check - In Clinic        Future Appointments   Date Time Provider Department Center   8/30/2021 10:40 AM Bethany Brody MD [id number]                    Superior mesenteric artery thrombosis

## 2021-11-11 NOTE — PROGRESS NOTE ADULT - PROBLEM SELECTOR PLAN 1
- ACC/AHA stage D systolic heart failure s/p LVAD   - Continue with Toprol XL 50 mg QD   - Continue with hydralazine 25mg PO Q8.  - continue with Spironolactone 25mg PO Q24  - Encourage ambulation, PT.

## 2021-11-11 NOTE — PROGRESS NOTE ADULT - SUBJECTIVE AND OBJECTIVE BOX
RICKY JOINT  MRN#: [id number]  Subjective:  Pulmonary progress  : recurrent Acute hypoxic respiratory Failure ,aspiration pneumonia, NICM  ,   65M PMH ACC/AHA stage D HF due to NICM HM2 LVAD , TV annuloplasty ring 17 as destination therapy due to severe peripheral artery disease with significant stenosis  SIADH, Depression, CKD-3 with hyperkalemia, past E. coli UTIs, driveline drainage (21) and COVID-19 (back in 2020)  He was recently seen in clinic where he complained of abdominal pain and dark stools w constipation back in May. He presents to [facility] ER today weakness and fatigue, moderate and + Black stools for three days, on coumadin secondary to warfarin use in the setting of an LVAD. Patient has required transfusions for GIB in the past. Mostly recently back in 2021 pt had anemia with dark stools. No interventions was done at that time. However Last Endoscopy was done in 2020 (negative). Today labs show patient is anemic with H/H of 4.5/16.3,. INR is 8.84 MAP in the 90s, Temp 35.1. He denies any chest pain, shortness of breath, dizziness, abd pain, nausea or vomiting. found to have  rectal bleeding underwent endoscopy ,old blood in the proximal ileum ,  develop sepsis with LL opacity given Antibiotics , Extubated , reintubated , Bronchoscopy on Zosyn for LL pneumonia  and Amiodarone S/P TV Annuloplasty , patient remain intubated on full ventilatory support .S/P multiple units of blood transfusion , remain on full ventilatory support on Precedex and propofol , new central IJ line , diarrhea C diff. +ve on po vancomycin and IV Flagyl,  mildly distended belly , fever start on cefepime 2gm q 8 hrs S/P tracheostomy .  new RT Subclavian central line continue on contact  isolation ,S/P  C-diff antibiotics, no more diarrhea back on full support mechanical ventilator , chest x ray show improvement in LLL air space disease, more awake and responsive on tube feeding no more diarrhea ,  no nausea or vomiting or diarrhea still very weak and tired , back on tube feeding ,still on po vancomycin , getting PT and OT at bed side ,   , no SOB getting stronger , improve muscle tone patient transfer to monitor bed still on contact isolation for C-Difficel colitis on 50% FI02, and change to Suresh  tube feeding still loose stool . H/H drop significantly require blood transfusion , most likely GI bleeding , IV heparin D/C ,  H/H is stable ., patient develop TR sided  pneumothorax require chest tube placement , RT IJ central line  placed , develop fever shaking chills , blood culture positive for serratia marcescens , start on cefepime .the patient  become hypoxic and hypotensive placed on full ventilatory support and Vasopressin , levophed and Dobutamine ,S/P blood transfusion on meropenem and vancomycin ,    .S/P IR cholecystostomy tube drainage placement in the RT upper Quadrant , resume anticoagulation chest x ray noted   on methimazole for hyperthyroidism ,  condition is the same ,still C/O Abdominal pain , white count is improving , no chest pain or SOB ,  .placed on Reglan 10 mg TID for gastric motility , depressed , withdrawn. , S/P  mesenteric angiogram , unable to stent SMA S/P 3 units of blood transfusion   RT IJ in place reassessed for stent in the SMA by vascular,  dark stool stable H/H ,surgical opinion for possible Lap cholecystectomy. over night events noted develop respiratory distress, , rectal bleeding, require intubation placed on full ventilatory support , FI02 is 50% also became hemianosmically unstable require triple pressor support with levophed , vasopressin and norsynephrine, pan culture placed  on Vancomycin IV and PO  and Zosyn, sedated with propofol kept NPO , new central line RT and left IJ cathter placed .    , S/P  tracheostomy , ID and heart failure follow up appreciated , depresses no weaning for now , patient S/P  vascular procedure for superior mesenteric artery occlusion S/P 2 stent placement , patient tolerate it very well  ,  increase WBC , new RT UPE midline ,    WBC is improving   , S/P FEEST study result is pending , PO feeding with observation . on Cipro antibiotics  no major over night event  on monitor bed .start decannulation to cuffless trach . on 40% FI02, po feeding   , good saturation , on pure die D/C NG tube feeding  no plan for decannulation still significant secretion , refuse medication and Diet is very Suboptimal . depressed again      (2021 16:57)    PAST MEDICAL & SURGICAL HISTORY:  CHF (congestive heart failure)    CAD (coronary artery disease)  Depression    Pleural effusion    History of  novel coronavirus disease (COVID-19)  2020    Hemorrhoids    Bleeding hemorrhoids    Peripheral arterial disease    Claudication    BPH with urinary obstruction    ACC/AHA stage D systolic heart failure  Anticoagulation goal of INR 2.0 to 2.5    Falls    Clavicle fracture    CKD (chronic kidney disease), stage III    Iron deficiency anemia    H/O epistaxis    Vertigo    GI bleed    S/P TVR (tricuspid valve repair)    S/P ventricular assist device    S/P endoscopy    OBJECTIVE:  ICU Vital Signs Last 24 Hrs  T(C): 38.2 (2021 10:00), Max: 38.5 (2021 12:00)  T(F): 100.8 (2021 10:00), Max: 101.3 (2021 12:00)  HR: 65 (2021 10:00) (61 - 69)  BP: --  BP(mean): --  ABP: 105/67 (2021 10:00) (90/54 - 113/64)  ABP(mean): 77 (2021 10:00) (63 - 77)  RR: 20 (2021 10:00) (19 - 35)  SpO2: 99% (2021 10:00) (96% - 100%)       @ : @ 07:00  --------------------------------------------------------  IN: 2693.9 mL / OUT: 1415 mL / NET: 1278.9 mL     @ 07: @ 10:49  --------------------------------------------------------  IN: 420.8 mL / OUT: 115 mL / NET: 305.8 mL  PHYSICAL EXAM: Daily   Elderly male  on trach. collar  FI02 is 40% S/P tracheostomy   Daily Weight in k.4 (2021 00:00)  HEENT:     + NCAT  + EOMI  - Conjuctival edema   - Icterus   - Thrush   - ETT  - NGT/OGT  Neck:         + FROM  + JVD  - Nodes - Masses + Mid-line trachea + Tracheostomy  Chest:            normal A-P diameter    Lungs:          + CTA   + Rhonchi    - Rales    - Wheezing + Decreased  LT BS   - Dullness R L  Cardiac:       + S1 + S2    + RRR   - Irregular   - S3  - S4    - Murmurs   - Rub   - Hamman’s sign   Abdomen:    + BS  + Soft + Non-tender - Distended - Organomegaly - PEG .cholecystostomy tube in place  Extremities:   - Cyanosis U/L   - Clubbing  U/L  + LE/UE Edema LUE hematoma   + Capillary refill    + Pulses   Neuro:        - Awake   -  Alert   - Confused   - Lethargic   + Sedated  + Generalized Weakness  Skin:        - Rashes    - Erythema   + Normal incisions   + IV sites intact          HOSPITAL MEDICATIONS: All medications reviewed and analyzed  MEDICATIONS  (STANDING):  amiodarone    Tablet 200 milliGRAM(s) Oral daily  chlorhexidine 0.12% Liquid 15 milliLiter(s) Oral Mucosa every 12 hours  chlorhexidine 2% Cloths 1 Application(s) Topical <User Schedule>  dexmedetomidine Infusion 0.5 MICROgram(s)/kG/Hr (9.81 mL/Hr) IV Continuous <Continuous>  dextrose 50% Injectable 50 milliLiter(s) IV Push every 15 minutes  heparin  Infusion 400 Unit(s)/Hr (12.5 mL/Hr) IV Continuous <Continuous>  Hydromorphone  Injectable 0.5 milliGRAM(s) IV Push once  insulin lispro (ADMELOG) corrective regimen sliding scale   SubCutaneous every 6 hours  pantoprazole  Injectable 40 milliGRAM(s) IV Push every 12 hours  piperacillin/tazobactam IVPB.. 3.375 Gram(s) IV Intermittent every 8 hours  propofol Infusion 20 MICROgram(s)/kG/Min (9.42 mL/Hr) IV Continuous <Continuous>  sodium chloride 0.9% lock flush 3 milliLiter(s) IV Push every 8 hours  sodium chloride 0.9%. 1000 milliLiter(s) (10 mL/Hr) IV Continuous <Continuous>    MEDICATIONS  (PRN):  acetaminophen    Suspension .. 650 milliGRAM(s) Oral every 6 hours PRN Temp greater or equal to 38C (100.4F)    LABS: All Lab data reviewed and analyzed                         10.  )-----------( 268      ( 2021 07:15 )             31.8   11-    133<L>  |  97  |  14  ----------------------------<  95  3.9   |  25  |  0.55    Ca    9.9      2021 07:14  Phos  3.5     11-10  Mg     1.8     11-10    TPro  8.2  /  Alb  3.8  /  TBili  0.5  /  DBili  x   /  AST  31  /  ALT  43  /  AlkPhos  191<H>      Ca    9.6      10 Nov 2021 06:35  Phos  3.5     11-10  Mg     1.8     11-10    TPro  8.2  /  Alb  3.9  /  TBili  0.6  /  DBili  x   /  AST  27  /  ALT  34  /  AlkPhos  170<H>                 29.8   Ca    9.8      2021 05:18  Phos  4.3     11-09  Mg     1.8                                                                                                                                                                                             PTT - ( 2021 04:52 )  PTT:45.2 sec LIVER FUNCTIONS - ( 2021 00:42 )  Alb: 3.4 g/dL / Pro: 6.7 g/dL / ALK PHOS: 213 U/L / ALT: 15 U/L / AST: 24 U/L / GGT: x           RADIOLOGY: - Reviewed and analyzed  , LVAD HM2, CT scan of abdomen reviewed result noted

## 2021-11-11 NOTE — PROGRESS NOTE ADULT - ASSESSMENT
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 coli UTIs, driveline drainage (1/7/21) and COVID-19 (back in April 2020)  He was recently seen in clinic where he complained of abdominal pain and dark stools w constipation back in May. He presents to [facility] ER today weakness and fatigue, moderate and + Black stools for three days, on coumadin secondary to warfarin use in the setting of an LVAD. Patient has required transfusions for GIB in the past. Mostly recently back in jan 2021 pt had anemia with dark stools. No interventions was done at that time. However Last Endoscopy was done in July 2020 (negative). Today labs show patient is anemic with H/H of 4.5/16.3,. INR is 8.84 MAP in the 90s, Temp 35.1. He denies any chest pain, shortness of breath, dizziness, abd pain, nausea or vomiting.       (14 Jun 2021 16:57)  Surgery/Hospital Course:  6/14 admit for melena w/ anemia, INR 8.84   6/15 Capsul study (+) for small bowel bleed, balloon endoscopy (old blood in prox ileum); post EGD - septic w/ L opacity, re-intubated for concern for aspiration, TTE (Mod MR, decrease biV w/ interventricular septum boweing towards R)  6/17 bronch   6/20 +C Diff   6/22 CT C/A/P: Fluid filled colon which may be 2/2 rapid transit. Small bilateral pleffs with associates. Compressive atelectasis New ISABELLE & LLL  parenchymal opacities, suspicious for pneumonia. Moderate stenosis in the proximal superior mesenteric artery.   6/25 #8 Shiley trach at bedside   7/1 LVAD speed increased to 9200  7/2 Bronch  7/20 TC since 7/7. Patient transferred to SDU.   7/23 INR today 2.64.  H&H 7.3/24 this AM.  Will repeat CBC at noon, and will send stool guaiac Patient with persistent abdominal tenderness, rate of tube feeds decreased.  No nausea/vomiting.    7/24 INR today 2.4. H&H 9.1/28.6 low flow overnight /N&V, refusing Tube feeds on D5 1/2 normal  @50 cc/hr  7/25 INR 2.69  H&H 7.7/25.1 refusing Tube feeds on D5 1/2 normal  @50 cc/hr. This am + BM Melena Dr Oneill HF  aware- PRBC x1  GI team consulted -  NPO  plan on study in am-  D/w Dr Cadet Patient  to return to CTU for further management; 1PRBCS   7/27 Post op INR 2.2 today.  No bleeding. BC + for SM.  Pt is hypotensive requiring pressor and inotropic support.  ID follow up today on Cefepime will follow.  7/26 R PTC for PTX   7/28 CT C/A/P: sub q emphysema in R chest wall, GGO RUL, small ascites CTH negative; Abd US: GB thickening, pericholecystic fluid    7/30 perc choley  7/31 Perchole drain in place continues to drain total output overnight 133.  Fever today 38.8   8/1 duplex LE negative   8/7 Patient with persistent abdominal pain, refusing tube feeds and medications, Psych consulted  8/13 CTA A/P ordered to r/o mesenteric ischemia 2/2 persistent anorexia, nausea, vomiting. Revealed:  Evaluation of the mesenteric vessels is limited by streak artifact from LVAD. There appears to be severe stenosis of the proximal SMA; abdominal mesenteric doppler is recommended for further evaluation. 2.  No small bowel findings to suggest acute mesenteric ischemia. 3.  Focal dissections involving the right and left common iliac arteries.  8/15: Cultures resulted BC 1/2 +GPC in clusters, SC enterobacter; mesenteric duplex: borderline stenosis of proximal SMA  8/27: CT C/A/P noncon: Nondular opacities in R lung apex w/cavitation, abd nl  8/31:  Continue current care, treatment of thyrotoxicosis with medications as per endocrine, d/c ABX as per team.   9/8 RUQ sono: Contracted gallbladder with cholecystostomy tube in place.  9/10 failed SMA stent, c/b RP bleed s/p 3U PRCB  9/12 CTA Abd/Pelvis L RP hematoma   9/24 Trach decannulated   9/26 intubated fro resp distress for increased WOB, LL pneumonia; CT C/A/P: progressive ISABELLE opacities and L consolidations, multifocal pneumonia   9/30 TTE (EF 25%, decreased RV, mod MR)   10/3 VT -> Lidocaine gtt   10/4 Trach size 6 DCT cuff  10/5 CT abd (neg for intra-abd abcess, severe stenosis of prox SMA and b/l iliac arteries)  10/18 SMA Stent   10/23 Emend for nausea   10/24 +Occult  10/29 FEES, RUQ ascites and pericholecystic fluid   Theckened liqwuids puree diet- calorie ct    Today:  Ambulation, TC, and PO diet as tolerated.  Day 2 of calorie count  Discussions yesterday as to discharge planning, agreeable to go to [facility] however will need to be decannulated and NGT removed.  Patient candidate for SDU.   11/4 Transferred to sdu.  Must be oob - chair for all meals.  Eventual change to uncuffed trach.  11/5 VSS; rsr/ st ; changed trach to #6 shiley cuffless this am by ENT- tolerated well; continue nutritional support with kaofeed; h/h stable 8/26 today   DNR rescinded today as per CHF/ LVAD team   11/6 VSS: pt tolerating supervised po diet; shantelle count in progress; trach care/suctioning q 3 hours; increase hydral 20 q8 as per CHF/LVAD team  11/7   OOB to chair,   vss   keofeed w/ shantelle count    dawn drain    ald 25 qd  added by HF,  no LVAD alarms  11/8 sluggish today, refusing meds.    Plan to hold TF and increase PO intake. If adequate intake plan to d/c ngt  11/9   Cont off TF, calorie count .  11/10 The patient is non compliant today, not stating reasons why Secretions , suction q2-3 Calorie count, remains off TF  11/11 Pending placement at [facility]. Pt was assessed for diet upgrade without PMV in place.   Easy to chew texture diet with Thin liquids via SMALL SINGLE SIPS. Maintain upright posture during/after eating for 30 mins; oral hygiene; position upright (90 degrees); small sips/bites. 100% supervision

## 2021-11-11 NOTE — PROGRESS NOTE ADULT - SUBJECTIVE AND OBJECTIVE BOX
Subjective: Patient seen and examined resting in bed.     Medications:  aspirin  chewable 81 milliGRAM(s) Oral daily  chlorhexidine 2% Cloths 1 Application(s) Topical <User Schedule>  ciprofloxacin     Tablet 500 milliGRAM(s) Oral every 12 hours  hydrALAZINE 25 milliGRAM(s) Oral every 8 hours  insulin lispro (ADMELOG) corrective regimen sliding scale   SubCutaneous three times a day before meals  insulin lispro (ADMELOG) corrective regimen sliding scale   SubCutaneous at bedtime  magnesium oxide 400 milliGRAM(s) Oral every 12 hours  methimazole 10 milliGRAM(s) Oral daily  metoclopramide Injectable 10 milliGRAM(s) IV Push every 8 hours  metoprolol succinate ER 50 milliGRAM(s) Oral daily  mirtazapine 7.5 milliGRAM(s) Oral at bedtime  ondansetron Injectable 4 milliGRAM(s) IV Push every 8 hours PRN  pantoprazole    Tablet 40 milliGRAM(s) Oral two times a day  sertraline 100 milliGRAM(s) Oral daily  spironolactone 25 milliGRAM(s) Oral daily  vancomycin    Solution 125 milliGRAM(s) Oral every 12 hours    Vitals:  Vital Signs Last 24 Hours  T(C): 36.6 (21 @ 10:30), Max: 36.6 (21 @ 10:30)  HR: 108 (21 @ 10:30) (105 - 118)  BP: --  RR: 17 (21 @ 10:30) (17 - 18)  SpO2: 100% (21 @ 10:30) (100% - 100%)    Weight in k.8 ( @ 10:56)    I&O's Summary    10 Nov 2021 07:  -  2021 07:00  --------------------------------------------------------  IN: 360 mL / OUT: 1150 mL / NET: -790 mL    2021 07:  -  2021 14:28  --------------------------------------------------------  IN: 600 mL / OUT: 50 mL / NET: 550 mL        Physical Exam  General: No distress. Comfortable.  HEENT: EOM intact.  Neck: +trach collar   Chest: Clear to auscultation bilaterally  CV: +VAD hum  Abdomen: Soft, non-distended, non-tender, +bilary drain  Skin: No rashes or skin breakdown  Neurology: Alert and oriented times three.    LVAD Interrogation  VAD TYPE HM 2  Speed 9200  Flow 4.9  Power 5.4  PI 6.8  Assessment of driveline exit site: driveline dressing c/d/i  Programming changes: no changes made    Labs:                        10.1   6.04  )-----------( 268      ( 2021 07:15 )             31.8         133<L>  |  97  |  14  ----------------------------<  95  3.9   |  25  |  0.55    Ca    9.9      2021 07:14  Phos  3.5     -10  Mg     1.8     11-10    TPro  8.2  /  Alb  3.8  /  TBili  0.5  /  DBili  x   /  AST  31  /  ALT  43  /  AlkPhos  191<H>                Lactate Dehydrogenase, Serum: 285 U/L (11-10 @ 06:35)  Lactate Dehydrogenase, Serum: 274 U/L ( @ 05:18)

## 2021-11-11 NOTE — PROGRESS NOTE ADULT - SUBJECTIVE AND OBJECTIVE BOX
VITAL SIGNS    Telemetry:  SR 90  Vital Signs Last 24 Hrs  T(C): 36.6 (21 @ 10:30), Max: 36.6 (21 @ 10:30)  T(F): 97.9 (21 @ 10:30), Max: 97.9 (21 @ 10:30)  HR: 108 (21 @ 10:30) (105 - 118)  BP: --  RR: 17 (21 @ 10:30) (17 - 18)  SpO2: 100% (21 @ 10:30) (100% - 100%)            11-10 @ 07:01  -   @ 07:00  --------------------------------------------------------  IN: 360 mL / OUT: 1150 mL / NET: -790 mL     @ 07:01  -   @ 12:56  --------------------------------------------------------  IN: 600 mL / OUT: 50 mL / NET: 550 mL       Daily     Daily Weight in k.8 (2021 10:56)  Admit Wt: Drug Dosing Weight  Height (cm): 177.8 (18 Oct 2021 15:55)  Weight (kg): 61.6 (18 Oct 2021 15:55)  BMI (kg/m2): 19.5 (18 Oct 2021 15:55)  BSA (m2): 1.77 (18 Oct 2021 15:55)    Bilirubin Total, Serum: 0.5 mg/dL ( 07:14)    CAPILLARY BLOOD GLUCOSE      POCT Blood Glucose.: 127 mg/dL (2021 11:48)  POCT Blood Glucose.: 103 mg/dL (2021 08:04)  POCT Blood Glucose.: 132 mg/dL (10 Nov 2021 21:14)          MEDICATIONS  aspirin  chewable 81 milliGRAM(s) Oral daily  chlorhexidine 2% Cloths 1 Application(s) Topical <User Schedule>  ciprofloxacin     Tablet 500 milliGRAM(s) Oral every 12 hours  hydrALAZINE 25 milliGRAM(s) Oral every 8 hours  insulin lispro (ADMELOG) corrective regimen sliding scale   SubCutaneous three times a day before meals  insulin lispro (ADMELOG) corrective regimen sliding scale   SubCutaneous at bedtime  magnesium oxide 400 milliGRAM(s) Oral every 12 hours  methimazole 10 milliGRAM(s) Oral daily  metoclopramide Injectable 10 milliGRAM(s) IV Push every 8 hours  metoprolol succinate ER 50 milliGRAM(s) Oral daily  mirtazapine 7.5 milliGRAM(s) Oral at bedtime  ondansetron Injectable 4 milliGRAM(s) IV Push every 8 hours PRN  pantoprazole    Tablet 40 milliGRAM(s) Oral two times a day  sertraline 100 milliGRAM(s) Oral daily  spironolactone 25 milliGRAM(s) Oral daily  urea Oral Powder 15 Gram(s) Oral daily  vancomycin    Solution 125 milliGRAM(s) Oral every 12 hours      >>> <<<  PHYSICAL EXAM  Subjective: NAD  Neurology: alert and oriented x 3, nonfocal, no gross deficits  CV : HM sounds trach  Sternal Wound :  CDI , Stable  Lungs:CTA   Abdomen: soft, NT,ND, (+ )BM  :  voiding  Extremities: -c/c/e      LVAD Interrogation: HM2  Pump Flow: 4.8  Pump Speed: 9200 RPM  Pulse Index: 5.5  Pump Power: 5.6  VAD Events: No alarms.  Driveline evaluation:  No drainage or tenderness.     LABS      133<L>  |  97  |  14  ----------------------------<  95  3.9   |  25  |  0.55    Ca    9.9      2021 07:14  Phos  3.5     11-10  Mg     1.8     11-10    TPro  8.2  /  Alb  3.8  /  TBili  0.5  /  DBili  x   /  AST  31  /  ALT  43  /  AlkPhos  191<H>                                   10.1   6.04  )-----------( 268      ( 2021 07:15 )             31.8                 PAST MEDICAL & SURGICAL HISTORY:  CHF (congestive heart failure)    CAD (coronary artery disease)    Depression    Pleural effusion    History of 2019 novel coronavirus disease (COVID-19)  2020    Hemorrhoids    Bleeding hemorrhoids    Peripheral arterial disease    Claudication    BPH with urinary obstruction    ACC/AHA stage D systolic heart failure    Anticoagulation goal of INR 2.0 to 2.5    Falls    Clavicle fracture    CKD (chronic kidney disease), stage III    Iron deficiency anemia    H/O epistaxis    Vertigo    GI bleed    S/P TVR (tricuspid valve repair)    S/P ventricular assist device    S/P endoscopy

## 2021-11-11 NOTE — PROGRESS NOTE ADULT - ASSESSMENT
65 YO M with a history of stage D NICM s/p HM2 on 9/2017 as DT (due to severe PAD) with TV ring, prior COVID-19 infection 4/2020, recurrent syncope post LVAD s/p ILR, and chronic abdominal pain with prior negative workup who was admitted 6/14/21 with symptomatic anemia with Hgb 4.5 in setting of INR 8.8 without hemodynamic instability and has since had a protracted hospitalization. He was transfused and underwent VCE which showed active bleeding in the mid small bowel but subsequent enteroscopy 6/15 did not reveal any active bleeding. He acutely decompensated after procedure with fever/hypertension, low flow alarms, and pulmonary infiltrate with hypoxia requiring intubation from probable aspiration PNA. He was unable to extubated and has since undergone tracheostomy but tolerating persistent trach collar and nearing ability for decannulation. 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 He underwent angiogram on 9/10, stent was unable to be placed and course was then complicated by RP bleed. He continued to have persistent leukocytosis and febrile episodes and was noted to have positive sputum culture for serratia marcescens and completed his course of abx. He was initially decannulated on 9/23. Recently he started having multiples of melena, emesis and went into acte respiratory distress and was ultimately re-intubated on 9/26.     He had blood cultures positive for enterobacter cloacae/serratia and sputum positive for stenotrophomonas remains on IV antibiotics. He is s/p trach with ENT on 10/4. He underwent SMA stent x 2 on 10/18. His nausea and vomiting has improved, and he is now tolerating PO diet along with tube feeds. His DNR/DNI has been rescinded. Diet has been advanced and keotube has been removed. Hopeful discharge to [facility] when bed is available.

## 2021-11-11 NOTE — PROGRESS NOTE ADULT - ASSESSMENT
Assessment and Recommendation:   · Assessment	  Assessment and recommendation :  Recurrent Acute hypoxic respiratory Failure  FI02 is 40% S/P tracheostomy  on track. collar   Acute blood loss anemia S/P multiple  blood transfusion post tracheostomy   start Decannulation to cuffless trach.  sepsis   on Cipro tablets   po vancomycin   C-Dificle colitis on po vancomycin   S/P cholecystostomy tube placement by IR    to repeat Abdominal ultra sound  patient is S/P  repeat vacular procedure and SMA stent   AF RVR back to regular sinus Rhythm   Non ischemic cardiomyopathy continue ACE inhibitor and B-Blockers   Stage D systolic heart failure S/P LVAD HM2   MH2 LVAD  with  TV Annuloplasty  Severe peripheral vascular disease   severe hyperglycemia on insulin coverage    Reglan 10 mg for Gastric Motility   hyperthyroidism on Methimazole 10mg TID   critical care polyneuropathy   Anemia of Acute blood Loss   severe protein caloric malnutrition   magnesium supplement keep above 2   Chronic kidney disease stage III  Depressed and withdrawn   on   PO trial with pure diet   start decannulate down to Shiley 6 cuffless   GI prophylaxis with PPI

## 2021-11-11 NOTE — PROGRESS NOTE ADULT - PROBLEM SELECTOR PLAN 3
- Has been transfused multiple times throughout this admission, last transfusion was on 10/23  - Hb currently stable, on ASA 81mg Q24.  -Continue with Protonix 40 mg PO BID

## 2021-11-12 NOTE — SPEAKING VALVE EVALUATION - SLP PRECAUTIONS/LIMITATIONS: HEARING
within functional limits

## 2021-11-12 NOTE — PROGRESS NOTE ADULT - ASSESSMENT
Assessment and Recommendation:   · Assessment	  Assessment and recommendation :  Recurrent Acute hypoxic respiratory Failure  FI02 is 40% S/P tracheostomy  on track. collar   Acute blood loss anemia S/P multiple  blood transfusion post tracheostomy   start Decannulation to cuffless trach.size 6   sepsis   on Cipro tablets   po vancomycin   C-Dificle colitis on po vancomycin   S/P cholecystostomy tube placement by IR    to repeat Abdominal ultra sound  patient is S/P  repeat vacular procedure and SMA stent   AF RVR back to regular sinus Rhythm   Non ischemic cardiomyopathy continue ACE inhibitor and B-Blockers   Stage D systolic heart failure S/P LVAD HM2   MH2 LVAD  with  TV Annuloplasty  Severe peripheral vascular disease   severe hyperglycemia on insulin coverage    Reglan 10 mg for Gastric Motility   hyperthyroidism on Methimazole 10mg TID   critical care polyneuropathy   Anemia of Acute blood Loss   severe protein caloric malnutrition   magnesium supplement keep above 2   Chronic kidney disease stage III  Depressed and withdrawn   on   PO trial with pure diet   start decannulate down to Shiley 6 cuffless   GI prophylaxis with PPI

## 2021-11-12 NOTE — PROGRESS NOTE ADULT - ASSESSMENT
67 YO M with a history of stage D NICM s/p HM2 on 9/2017 as DT (due to severe PAD) with TV ring, prior COVID-19 infection 4/2020, recurrent syncope post LVAD s/p ILR, and chronic abdominal pain with prior negative workup who was admitted 6/14/21 with symptomatic anemia with Hgb 4.5 in setting of INR 8.8 without hemodynamic instability and has since had a protracted hospitalization. He was transfused and underwent VCE which showed active bleeding in the mid small bowel but subsequent enteroscopy 6/15 did not reveal any active bleeding. He acutely decompensated after procedure with fever/hypertension, low flow alarms, and pulmonary infiltrate with hypoxia requiring intubation from probable aspiration PNA. He was unable to extubated and has since undergone tracheostomy but tolerating persistent trach collar and nearing ability for decannulation. 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 He underwent angiogram on 9/10, stent was unable to be placed and course was then complicated by RP bleed. He continued to have persistent leukocytosis and febrile episodes and was noted to have positive sputum culture for serratia marcescens and completed his course of abx. He was initially decannulated on 9/23. Recently he started having multiples of melena, emesis and went into acte respiratory distress and was ultimately re-intubated on 9/26.     He had blood cultures positive for enterobacter cloacae/serratia and sputum positive for stenotrophomonas remains on IV antibiotics. He is s/p trach with ENT on 10/4. He underwent SMA stent x 2 on 10/18. His nausea and vomiting has improved, and he is now tolerating PO diet along with tube feeds. His DNR/DNI has been rescinded. Diet has been advanced and keotube has been removed. He is tolerating trach collar at this time. Current plan is for him to be discharged to rehab with trach in place and once stronger will return for decannulation.  67 YO M with a history of stage D NICM s/p HM2 on 9/2017 as DT (due to severe PAD) with TV ring, prior COVID-19 infection 4/2020, recurrent syncope post LVAD s/p ILR, and chronic abdominal pain with prior negative workup who was admitted 6/14/21 with symptomatic anemia with Hgb 4.5 in setting of INR 8.8 without hemodynamic instability and has since had a protracted hospitalization. He was transfused and underwent VCE which showed active bleeding in the mid small bowel but subsequent enteroscopy 6/15 did not reveal any active bleeding. He acutely decompensated after procedure with fever/hypertension, low flow alarms, and pulmonary infiltrate with hypoxia requiring intubation from probable aspiration PNA. He was unable to extubated and has since undergone tracheostomy but tolerating persistent trach collar and nearing ability for decannulation. 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 He underwent angiogram on 9/10, stent was unable to be placed and course was then complicated by RP bleed. He continued to have persistent leukocytosis and febrile episodes and was noted to have positive sputum culture for serratia marcescens and completed his course of abx. He was initially decannulated on 9/23. Recently he started having multiples of melena, emesis and went into acte respiratory distress and was ultimately re-intubated on 9/26.     He had blood cultures positive for enterobacter cloacae/serratia and sputum positive for stenotrophomonas remains on IV antibiotics. He is s/p trach with ENT on 10/4. He underwent SMA stent x 2 on 10/18. His nausea and vomiting has improved, and he is now tolerating PO diet along with tube feeds. His DNR/DNI has been rescinded. Diet has been advanced and keotube has been removed. He is currently tolerating trach collar. Current plan is for him to be discharged to rehab with trach in place and once stronger will return for decannulation.

## 2021-11-12 NOTE — SPEAKING VALVE EVALUATION - DIAGNOSTIC IMPRESSIONS
Pt seen for passy-darren speaking valve evaluation. Pt maintained on 35% FIO2 via trach collar. Low pressure valve placed on hub of trach. Patient was dysphonic with hoarse vocal quality, which is gradually improving in quality and intensity. Pt tolerated valve with VSS stable throughout assessment. No signs of respiratory distress. No increased work of breathing. No subjective complaints. No signs of air trapping. Valve removed after.....Pt and RN instructed in PMV use and precautions. Findings d/w DILIP Lewis and NP Thony Holcomb. Pt seen for passy-darren speaking valve evaluation. Pt maintained on 35% FIO2 via trach collar. Low pressure valve placed on hub of trach. Patient was dysphonic with hoarse vocal quality, which is gradually improving in quality and intensity. Pt tolerated valve with VSS stable throughout assessment. No signs of respiratory distress. No increased work of breathing. No subjective complaints. No signs of air trapping. Valve removed after 45 mins. Pt and RN instructed in PMV use and precautions. Findings d/w DILIP Lewis and NP Thony Holcomb.

## 2021-11-12 NOTE — PROGRESS NOTE ADULT - ASSESSMENT
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 coli UTIs, driveline drainage (1/7/21) and COVID-19 (back in April 2020)  He was recently seen in clinic where he complained of abdominal pain and dark stools w constipation back in May. He presents to [facility] ER today weakness and fatigue, moderate and + Black stools for three days, on coumadin secondary to warfarin use in the setting of an LVAD. Patient has required transfusions for GIB in the past. Mostly recently back in jan 2021 pt had anemia with dark stools. No interventions was done at that time. However Last Endoscopy was done in July 2020 (negative). Today labs show patient is anemic with H/H of 4.5/16.3,. INR is 8.84 MAP in the 90s, Temp 35.1. He denies any chest pain, shortness of breath, dizziness, abd pain, nausea or vomiting.       (14 Jun 2021 16:57)  Surgery/Hospital Course:  6/14 admit for melena w/ anemia, INR 8.84   6/15 Capsul study (+) for small bowel bleed, balloon endoscopy (old blood in prox ileum); post EGD - septic w/ L opacity, re-intubated for concern for aspiration, TTE (Mod MR, decrease biV w/ interventricular septum boweing towards R)  6/17 bronch   6/20 +C Diff   6/22 CT C/A/P: Fluid filled colon which may be 2/2 rapid transit. Small bilateral pleffs with associates. Compressive atelectasis New ISABELLE & LLL  parenchymal opacities, suspicious for pneumonia. Moderate stenosis in the proximal superior mesenteric artery.   6/25 #8 Shiley trach at bedside   7/1 LVAD speed increased to 9200  7/2 Bronch  7/20 TC since 7/7. Patient transferred to SDU.   7/23 INR today 2.64.  H&H 7.3/24 this AM.  Will repeat CBC at noon, and will send stool guaiac Patient with persistent abdominal tenderness, rate of tube feeds decreased.  No nausea/vomiting.    7/24 INR today 2.4. H&H 9.1/28.6 low flow overnight /N&V, refusing Tube feeds on D5 1/2 normal  @50 cc/hr  7/25 INR 2.69  H&H 7.7/25.1 refusing Tube feeds on D5 1/2 normal  @50 cc/hr. This am + BM Melena Dr Oneill HF  aware- PRBC x1  GI team consulted -  NPO  plan on study in am-  D/w Dr Cadet Patient  to return to CTU for further management; 1PRBCS   7/27 Post op INR 2.2 today.  No bleeding. BC + for SM.  Pt is hypotensive requiring pressor and inotropic support.  ID follow up today on Cefepime will follow.  7/26 R PTC for PTX   7/28 CT C/A/P: sub q emphysema in R chest wall, GGO RUL, small ascites CTH negative; Abd US: GB thickening, pericholecystic fluid    7/30 perc choley  7/31 Perchole drain in place continues to drain total output overnight 133.  Fever today 38.8   8/1 duplex LE negative   8/7 Patient with persistent abdominal pain, refusing tube feeds and medications, Psych consulted  8/13 CTA A/P ordered to r/o mesenteric ischemia 2/2 persistent anorexia, nausea, vomiting. Revealed:  Evaluation of the mesenteric vessels is limited by streak artifact from LVAD. There appears to be severe stenosis of the proximal SMA; abdominal mesenteric doppler is recommended for further evaluation. 2.  No small bowel findings to suggest acute mesenteric ischemia. 3.  Focal dissections involving the right and left common iliac arteries.  8/15: Cultures resulted BC 1/2 +GPC in clusters, SC enterobacter; mesenteric duplex: borderline stenosis of proximal SMA  8/27: CT C/A/P noncon: Nondular opacities in R lung apex w/cavitation, abd nl  8/31:  Continue current care, treatment of thyrotoxicosis with medications as per endocrine, d/c ABX as per team.   9/8 RUQ sono: Contracted gallbladder with cholecystostomy tube in place.  9/10 failed SMA stent, c/b RP bleed s/p 3U PRCB  9/12 CTA Abd/Pelvis L RP hematoma   9/24 Trach decannulated   9/26 intubated fro resp distress for increased WOB, LL pneumonia; CT C/A/P: progressive ISABELLE opacities and L consolidations, multifocal pneumonia   9/30 TTE (EF 25%, decreased RV, mod MR)   10/3 VT -> Lidocaine gtt   10/4 Trach size 6 DCT cuff  10/5 CT abd (neg for intra-abd abcess, severe stenosis of prox SMA and b/l iliac arteries)  10/18 SMA Stent   10/23 Emend for nausea   10/24 +Occult  10/29 FEES, RUQ ascites and pericholecystic fluid   Theckened liqwuids puree diet- calorie ct    Today:  Ambulation, TC, and PO diet as tolerated.  Day 2 of calorie count  Discussions yesterday as to discharge planning, agreeable to go to [facility] however will need to be decannulated and NGT removed.  Patient candidate for SDU.   11/4 Transferred to sdu.  Must be oob - chair for all meals.  Eventual change to uncuffed trach.  11/5 VSS; rsr/ st ; changed trach to #6 shiley cuffless this am by ENT- tolerated well; continue nutritional support with kaofeed; h/h stable 8/26 today   DNR rescinded today as per CHF/ LVAD team   11/6 VSS: pt tolerating supervised po diet; shantelle count in progress; trach care/suctioning q 3 hours; increase hydral 20 q8 as per CHF/LVAD team  11/7   OOB to chair,   vss   keofeed w/ shantelle count    dawn drain    ald 25 qd  added by HF,  no LVAD alarms  11/8 sluggish today, refusing meds.    Plan to hold TF and increase PO intake. If adequate intake plan to d/c ngt  11/9   Cont off TF, calorie count .  11/10 The patient is non compliant today, not stating reasons why Secretions , suction q2-3 Calorie count, remains off TF  11/11 Pending placement at [facility]. Pt was assessed for diet upgrade without PMV in place.   Easy to chew texture diet with Thin liquids via SMALL SINGLE SIPS. Maintain upright posture during/after eating for 30 mins; oral hygiene; position upright (90 degrees); small sips/bites. 100% supervision  11/12 Difficulty placing at [facility] , may  now work towards decannulation, PMV trials at bedside.  'Encourage PO intake.

## 2021-11-12 NOTE — PROGRESS NOTE ADULT - SUBJECTIVE AND OBJECTIVE BOX
VITAL SIGNS    Telemetry:    afib 80  Vital Signs Last 24 Hrs  T(C): 36.6 (11-12-21 @ 08:00), Max: 36.7 (11-11-21 @ 14:30)  T(F): 97.9 (11-12-21 @ 08:00), Max: 98 (11-11-21 @ 14:30)  HR: 99 (11-12-21 @ 08:00) (93 - 110)  BP: --  RR: 18 (11-12-21 @ 08:00) (17 - 19)  SpO2: 100% (11-12-21 @ 08:00) (100% - 100%)                   11-11 @ 07:01  -  11-12 @ 07:00  --------------------------------------------------------  IN: 1240 mL / OUT: 1150 mL / NET: 90 mL    11-12 @ 07:01  -  11-12 @ 12:08  --------------------------------------------------------  IN: 360 mL / OUT: 0 mL / NET: 360 mL          Daily     Daily             CAPILLARY BLOOD GLUCOSE      POCT Blood Glucose.: 146 mg/dL (12 Nov 2021 11:50)  POCT Blood Glucose.: 96 mg/dL (12 Nov 2021 08:11)  POCT Blood Glucose.: 114 mg/dL (11 Nov 2021 21:19)                    Coumadin    [ ] YES          [ x ]      NO                                   PHYSICAL EXAM    Neurology: alert and oriented x 3, nonfocal, no gross deficits  CV : s1 s2 RRR  Trach cdi  Sternal Wound :  CDI , Stable  Lungs: cta  rlq driveline cdi  R flank drain  Abdomen: soft, nontender, nondistended, positive bowel sounds, last bowel movement                        :    voiding        Extremities:   -   edema   /  -   calve tenderness ,            aspirin  chewable 81 milliGRAM(s) Oral daily  chlorhexidine 2% Cloths 1 Application(s) Topical <User Schedule>  ciprofloxacin     Tablet 500 milliGRAM(s) Oral every 12 hours  hydrALAZINE 25 milliGRAM(s) Oral every 8 hours  insulin lispro (ADMELOG) corrective regimen sliding scale   SubCutaneous three times a day before meals  insulin lispro (ADMELOG) corrective regimen sliding scale   SubCutaneous at bedtime  magnesium oxide 400 milliGRAM(s) Oral every 12 hours  methimazole 10 milliGRAM(s) Oral daily  metoclopramide Injectable 10 milliGRAM(s) IV Push every 8 hours  metoprolol succinate ER 50 milliGRAM(s) Oral daily  mirtazapine 7.5 milliGRAM(s) Oral at bedtime  ondansetron Injectable 4 milliGRAM(s) IV Push every 8 hours PRN  pantoprazole    Tablet 40 milliGRAM(s) Oral two times a day  sertraline 100 milliGRAM(s) Oral daily  spironolactone 25 milliGRAM(s) Oral daily  vancomycin    Solution 125 milliGRAM(s) Oral every 12 hours                    Physical Therapy Rec:   Home  [  ]   Home w/ PT  [  ]  Rehab  [  ]  Discussed with Cardiothoracic Team at AM rounds.

## 2021-11-12 NOTE — SPEAKING VALVE EVALUATION - RECOMMENDATIONS
Placement of [place] Speaking Valve, as tolerated, for 15-20 minutes at a time, with 100% supervision.   Guidelines for valve usage as follows:  1) Cuff fully deflated   2) Do not place valve if patient with baseline RD and/or thick/copious secretions  3) Remove valve if: SpO2 <92%, HR/RR 1.5 x norm, pt with increased work of breathing , patient with subjective complaints, evidence of airtrapping  4) Remove while asleep and for aerosolized respiratory treatments
Defer use of PMV at this time. Trials of PMV with SLP only. This service will continue to follow for therapy for tolerance of PMV and dysphagia.
Placement of [place] Speaking Valve, as tolerated, during waking hours. During the first 1-2 days, the patient should be supervised during use.   Guidelines for valve usage as follows:  1) Do not place valve if patient with baseline RD and/or thick/copious secretions  2) Remove valve if: SpO2 <92%, HR/RR 1.5 x norm, pt with increased work of breathing , patient with subjective complaints, evidence of airtrapping  3) Remove while asleep and for aerosolized respiratory treatments
Placement of Pass[place] Speaking Valve, as tolerated, during waking hours. During the first 1-2 days, the patient should be supervised during use.   Guidelines for valve usage as follows:  1) Do not place valve if patient with baseline RD and/or thick/copious secretions  2) Remove valve if: SpO2 <92%, HR/RR 1.5 x norm, pt with increased work of breathing , patient with subjective complaints, evidence of airtrapping  3) Remove while asleep and for aerosolized respiratory treatments
Defer PMV use at this time. PMV trials with SLP only. Will continue to follow.

## 2021-11-12 NOTE — PROGRESS NOTE ADULT - PROBLEM SELECTOR PLAN 1
- ACC/AHA stage D systolic heart failure s/p LVAD   - Continue with Toprol XL 50 mg QD   - Continue with hydralazine 25mg PO Q8.  - continue with Spironolactone 25mg PO Q24  - Encourage ambulation, PT. - ACC/AHA stage D systolic heart failure s/p LVAD   - Continue with Toprol XL 50 mg QD and hydralazine 25mg PO Q8.  - continue with Spironolactone 25mg PO Q24  - Encourage ambulation, PT.

## 2021-11-12 NOTE — SPEAKING VALVE EVALUATION - CHANGE IN VITAL SIGNS DURING VALVE TRIAL
Brief desat x1 to upper 80s during coughing episode, returned to baseline quickly; per RN this is typical for him; otherwise, no change in vitals noted
No
Increase in RR and work of breathing/Yes

## 2021-11-12 NOTE — SPEAKING VALVE EVALUATION - SLP GENERAL OBSERVATIONS
Pt seen at bedside, OOB in chair, on TC, awake and alert, cooperative, following directions for the purposes of the evaluation.
Pt seen at bedside, awake and alert, oriented, verbally responsive, following directions for the purposes of the exam. Pt declining use of  for Creole at this time, despite encouragement.
Pt seen at bedside, awake and alert, oriented, verbally responsive, following directions for the purposes of the exam.
Pt seen at bedside, awake and alert, oriented, verbally responsive, following directions for the purposes of the exam.
Pt seen at bedside, awake and alert, oriented, verbally responsive, following directions for the purposes of the exam. Pt declining use of  for Creole at this time, despite encouragement.

## 2021-11-12 NOTE — CHART NOTE - NSCHARTNOTEFT_GEN_A_CORE
Nutrition Follow Up Note  Patient seen for: Calorie Count assessment    Chart reviewed, events noted. Per chart: 66M, PMH ACC/AHA stage D HF 2/2 NICM s/p HM2 LVAD (2017). Here initially for GIB prolonged hospital course, s/p tracheostomy, acalculous cholecystitis s/p perc dawn. Patient c persistent intermittent abdominal pain - found with SMA stenosis on mesenteric angiogram on 9/10. S/p trach placement 10/4. S/p mesenteric angiogram with x2 stents (10/18). now made DNR. Most recently has been receiving supplemental PO feeds after seen by SLP; Calorie count ordered to assess adequacy od PO intake    Source: EMR, Team, pt    Diet, Pureed:   Supplement Feeding Modality:  Oral  Ensure Enlive Cans or Servings Per Day:  3       Frequency:  Daily (21 @ 12:51)      Calorie Count initiated -, results are as follows:  : 847kcals & 23g protein  11/10: 320kcals & 10g protein  : 555kcals & 32g protein  * Average 3 day PO intake: 575kcals & 22g protein - Meets 33% low end energy needs & 26% low end protein needs  - Calorie Count - revealed average intake: 975kcals (56% low end energy needs) & 38g protein (45% low end protein needs)  - Calorie count 11/3- revealed average intake: 1000kcals (57% low end energy needs) & 38g protein (45% low end protein needs)    * Average 9 day PO intake meets 49% low end energy needs & 39% low end protein needs     Spoke with pt on increased nutritional needs at this time and ways to optimize nutritional intake while with decreased appetite. Discussed consuming small frequent meals, keeping nonperishable food items at bedside to consume throughout the day, and drinking oral nutrition supplement between meals. Discussed additional options to provide high protein (i.e. greek yogurt) and amenable to receive; additional dietary preferences obtained and RD to provide as feasible.     Nutrition-Related Events  - Pt notes he has been drinking 2 Ensure Enlive supplements daily and Magic cup x2 daily; however calorie count sheet only revealed ~1 of each supplement daily.  - Previously receiving EN via NGT of Vital 1.5 @ 50ml/hr x 24hrs (1800kcals, 81g protein) - stopped   - FEES completed 10/29 with SLP recommendations for "Pureed texture diet with Mildly Thick Liquids"  - Insulin Sliding Scale ordered to optimize BG  - MagOx supplementation ordered daily    GI:  Last BM .   Bowel Regimen? [] Yes   [x] No  - Noted on abx course at this time  - Pt denies chewing/swallowing issues, nausea/vomiting, diarrhea/constipation or other signs of acute GI distress at this time.     Weight in k.8 (), 50.1 (11-10), 50.4 (), 51.6 (), 51.5 (), 51.1 (), 50 (), 53.7 (), 52 (10-31), 50.8 (10-30), 54.7 (10-26), 55.3 (10-18), 59.8 (10-04), 62.1 (), 58.4 (), 71.2 ()    Drug Dosing Weight  Weight (kg): 61.6 (18 Oct 2021 15:55)  BMI (kg/m2): 19.5 (18 Oct 2021 15:55)  Admission weight: 176.3 pounds / 80 kg (6/15)    MEDICATIONS  (STANDING):  ciprofloxacin     Tablet  hydrALAZINE  insulin lispro (ADMELOG) corrective regimen sliding scale  insulin lispro (ADMELOG) corrective regimen sliding scale  magnesium oxide  methimazole  metoprolol succinate ER  pantoprazole    Tablet  spironolactone  vancomycin    Solution    Pertinent Labs:  @ 04:38: Na 134<L>, BUN 15, Cr 0.60, BG 96, K+ 4.3, Phos --, Mg 1.9, Alk Phos 184<H>, ALT/SGPT 38, AST/SGOT 29, HbA1c --    A1C with Estimated Average Glucose Result: 5.4 % (08-15-21 @ 13:52)  A1C with Estimated Average Glucose Result: 5.6 % (06-15-21 @ 02:42)    Finger Sticks:  POCT Blood Glucose.: 146 mg/dL ( @ 11:50)  POCT Blood Glucose.: 96 mg/dL ( @ 08:11)  POCT Blood Glucose.: 114 mg/dL ( @ 21:19)    Skin per nursing documentation: tracheal wound; no pressure injuries noted  Edema: none noted    Estimated Nutritional Needs  Recalculated based on recent weight 53.1kg (10/21)  Energy Needs (33-38 kcals/kg): 7849-7899  Protein Needs (1.6-2.0 g/kg):     Previous Nutrition Diagnosis: Severe chronic malnutrition  Nutrition Diagnosis is: [x] ongoing  - addressed with PO diet, oral nutrition & micronutrient supplementation; calorie count completed    New Nutrition Diagnosis: [x] Not applicable    Nutrition Care Plan:  [x] In Progress  [] Achieved  [] Not applicable    Recommendations:      1. Calorie count assessed - Pt meeting, on average, 33% low end energy needs & 26% low end protein needs in the past 3 days; Average 9 day intake meets 49% low end energy needs & 39% low end protein needs  2. Continue current diet as ordered with Ensure Enlive x3 daily - defer textures/consistencies to SLP/Team; RD to continue providing Magic Cup x2 daily  3. May consider supplemental EN to meet 100% nutritional needs  - If continuous 24hr feeds recommend: Vital 1.5 @ 25ml/hr x 24hrs to provide 600ml formula, 900kcals, 41g protein, 458ml free H2O (meets 17kcals/kg & 0.8g protein/kg based on 10/21 wt 53.1kg)  - If nocturnal feeds, recommend: Vital 1.5 @ 50ml/hr x 12hrs to provide 600ml formula, 900kcals, 41g protein, 458ml free H2O (meets 17kcals/kg & 0.8g protein/kg based on 10/21 wt 53.1kg)  4. Please add back in Multivitamin supplementation daily   5. RD to provide dietary preferences as feasible; RD remains available     Monitoring and Evaluation:   Continue to monitor nutritional intake, tolerance to diet prescription, weights, labs, skin integrity  RD remains available upon request and will follow up per protocol    Wendy Travis, MS, RD, CDN, [facility]  pager #[phone]

## 2021-11-12 NOTE — SPEAKING VALVE EVALUATION - SUCTIONED PRIOR TO SPEAKING VALVE TRIAL
no
RN Reports suctioning less frequently than before/no
RN Reports suctioning every three to four hours, states that Pt requests suction when he feels he needs it/no

## 2021-11-12 NOTE — SPEAKING VALVE EVALUATION - TYPE OF VALVE
PMV 2001 (Purple color)

## 2021-11-12 NOTE — PROGRESS NOTE ADULT - PROBLEM SELECTOR PLAN 3
- Has been transfused multiple times throughout this admission, last transfusion was on 10/23  - Hb currently stable, on ASA 81mg Q24.  - Continue with Protonix 40 mg PO BID

## 2021-11-12 NOTE — SPEAKING VALVE EVALUATION - SLP PERTINENT HISTORY OF CURRENT PROBLEM
Course: Stage D Nonischemic Cardiomyopathy, s/p HM2 9/2017; Cardiogenic shock; Hemodynamic instability; Acute hypoxemic respiratory failure s/p trach 6/25, decannulated 9/24; Reintubated 9/26; GI bleed, s/p Enteroscopy 6/14; Anemia in setting of melena; CKD; Stress hyperglycemia; C.diff positive 6/20; Hypovolemic shock; Septic shock; Leukocytosis; GB thickening/percholecystic s/p perc dawn by IR 7/30; SMA stenosis s/p SMA Stent on 10/18; Serratia/citrobacter PNA 7/7; Stenotrophomonas PNA 8/1; Enterobacter PNA 8/13; Nasuea/vomiting; Deconditioning; Hyponatremia. 6/15 Capsule study (+) for small bowel bleed, balloon endoscopy (old blood in prox ileum); post EGD - septic w/ L opacity, re-intubated for concern for aspiration, TTE (Mod MR, decrease biV w/ interventricular septum bowing towards R); 6/20 +C Diff;
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 coli UTIs, driveline drainage (1/7/21) and COVID-19 (April 2020). Seen in clinic for c/o abdominal pain and dark stools w constipation in May. He presents to [facility] ER 6/14 w/ weakness, fatigue, and + Black stools x3 days, warfarin use in setting of  LVAD. Pt has required transfusions for GIB in past. Most recently in jan 2021 pt had anemia with dark stools. No interventions at that time. Last Endoscopy done in July 2020 (negative).
67 yo male with PMH of stage D NICM s/p HM2 on 9/2017 as DT (due to severe PAD) with TV ring, prior COVID-19 infection 4/2020, recurrent syncope post LVAD s/p ILR, and chronic abdominal pain with prior negative workup was admitted 6/14/21 with symptomatic anemia with Hgb 4.5 in setting of INR 8.8 without hemodynamic instability, with subsequent prolonged, complicated course multiple intubations, trached 6/25, decannulated 9/24, re-intubated 9/26, re-trached 10/4, currently with #[id number], cleared for speaking valve, but not frequently wearing PMV, as per Pt preference.
Course: Stage D Nonischemic Cardiomyopathy, s/p HM2 9/2017; Cardiogenic shock; Hemodynamic instability; Acute hypoxemic respiratory failure s/p trach 6/25, decannulated 9/24; Reintubated 9/26; GI bleed, s/p Enteroscopy 6/14; Anemia in setting of melena; CKD; Stress hyperglycemia; C.diff positive 6/20; Hypovolemic shock; Septic shock; Leukocytosis; GB thickening/percholecystic s/p perc dawn by IR 7/30; SMA stenosis s/p SMA Stent on 10/18; Serratia/citrobacter PNA 7/7; Stenotrophomonas PNA 8/1; Enterobacter PNA 8/13; Nasuea/vomiting; Deconditioning; Hyponatremia. 6/15 Capsule study (+) for small bowel bleed, balloon endoscopy (old blood in prox ileum); post EGD - septic w/ L opacity, re-intubated for concern for aspiration, TTE (Mod MR, decrease biV w/ interventricular septum bowing towards R); 6/20 +C Diff;
Course: Stage D Nonischemic Cardiomyopathy, s/p HM2 9/2017; Cardiogenic shock; Hemodynamic instability; Acute hypoxemic respiratory failure s/p trach 6/25, decannulated 9/24; Reintubated 9/26; GI bleed, s/p Enteroscopy 6/14; Anemia in setting of melena; CKD; Stress hyperglycemia; C.diff positive 6/20; Hypovolemic shock; Septic shock; Leukocytosis; GB thickening/percholecystic s/p perc dawn by IR 7/30; SMA stenosis s/p SMA Stent on 10/18; Serratia/citrobacter PNA 7/7; Stenotrophomonas PNA 8/1; Enterobacter PNA 8/13; Nasuea/vomiting; Deconditioning; Hyponatremia. 6/15 Capsule study (+) for small bowel bleed, balloon endoscopy (old blood in prox ileum); post EGD - septic w/ L opacity, re-intubated for concern for aspiration, TTE (Mod MR, decrease biV w/ interventricular septum bowing towards R); 6/20 +C Diff;

## 2021-11-12 NOTE — SPEAKING VALVE EVALUATION - SPUTUM - AMOUNT
scant
scant prior to trial; after removal of trach, Pt with coughing episode and noted to expel large amount of secretions from trach site.
scant
small
scant

## 2021-11-12 NOTE — PROGRESS NOTE ADULT - SUBJECTIVE AND OBJECTIVE BOX
Subjective: Patient seen and examined resting in bed.     Medications:  aspirin  chewable 81 milliGRAM(s) Oral daily  chlorhexidine 2% Cloths 1 Application(s) Topical <User Schedule>  ciprofloxacin     Tablet 500 milliGRAM(s) Oral every 12 hours  hydrALAZINE 25 milliGRAM(s) Oral every 8 hours  insulin lispro (ADMELOG) corrective regimen sliding scale   SubCutaneous three times a day before meals  insulin lispro (ADMELOG) corrective regimen sliding scale   SubCutaneous at bedtime  magnesium oxide 400 milliGRAM(s) Oral every 12 hours  methimazole 10 milliGRAM(s) Oral daily  metoclopramide Injectable 10 milliGRAM(s) IV Push every 8 hours  metoprolol succinate ER 50 milliGRAM(s) Oral daily  mirtazapine 7.5 milliGRAM(s) Oral at bedtime  ondansetron Injectable 4 milliGRAM(s) IV Push every 8 hours PRN  pantoprazole    Tablet 40 milliGRAM(s) Oral two times a day  sertraline 100 milliGRAM(s) Oral daily  spironolactone 25 milliGRAM(s) Oral daily  vancomycin    Solution 125 milliGRAM(s) Oral every 12 hours    Vitals:  Vital Signs Last 24 Hours  T(C): 36.6 (21 @ 03:00), Max: 36.7 (21 @ 14:30)  HR: 103 (21 @ 06:06) (93 - 110)  BP: --  RR: 19 (21 @ 06:06) (17 - 19)  SpO2: 100% (21 @ 06:06) (100% - 100%)    Weight in k.8 ( @ 10:56)    I&O's Summary    2021 07:01  -  2021 07:00  --------------------------------------------------------  IN: 1240 mL / OUT: 1150 mL / NET: 90 mL        Physical Exam  General: No distress. Comfortable.  HEENT: EOM intact.  Neck: +trach collar   Chest: Clear to auscultation bilaterally  CV: +VAD hum  Abdomen: Soft, non-distended, non-tender, +bilary drain  Neurology: Alert and oriented times three.    LVAD Interrogation  VAD TYPE HM 2  Speed 9200  Flow 4.3  Power 5.1  PI  5.9  Assessment of driveline exit site: driveline exit site c/d/i  Programming changes: no changes made    Labs:                        10.3   5.93  )-----------( 259      ( 2021 04:38 )             32.6     -12    134<L>  |  97  |  15  ----------------------------<  96  4.3   |  26  |  0.60    Ca    9.7      2021 04:38  Mg     1.9     -12    TPro  8.2  /  Alb  3.8  /  TBili  0.4  /  DBili  x   /  AST  29  /  ALT  38  /  AlkPhos  184<H>  11-12              Lactate Dehydrogenase, Serum: 285 U/L (11-10 @ 06:35)

## 2021-11-12 NOTE — PROGRESS NOTE ADULT - SUBJECTIVE AND OBJECTIVE BOX

## 2021-11-12 NOTE — SPEAKING VALVE EVALUATION - SPECIFY REASON(S)
to assess candidacy for speaking valve use
to assess candidacy for PMV use for functional communication
to assess candidacy for PMV use for functional communication
to assess for use of PMV for functional communication
to assess tolerance of PMV use for functional communication

## 2021-11-13 NOTE — PROGRESS NOTE ADULT - PROBLEM SELECTOR PLAN 1
HF follow up   added ald 25 qd  mAINTAIN CURRENT SPEED.  asa 81, toprol xl 50 qd  rt upper extremity  midline for access   hydral 25 q8 as per CHF/LVAD team   rehab when stable

## 2021-11-13 NOTE — PROGRESS NOTE ADULT - ASSESSMENT
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 coli UTIs, driveline drainage (1/7/21) and COVID-19 (back in April 2020)  He was recently seen in clinic where he complained of abdominal pain and dark stools w constipation back in May. He presents to [facility] ER today weakness and fatigue, moderate and + Black stools for three days, on coumadin secondary to warfarin use in the setting of an LVAD. Patient has required transfusions for GIB in the past. Mostly recently back in jan 2021 pt had anemia with dark stools. No interventions was done at that time. However Last Endoscopy was done in July 2020 (negative). Today labs show patient is anemic with H/H of 4.5/16.3,. INR is 8.84 MAP in the 90s, Temp 35.1. He denies any chest pain, shortness of breath, dizziness, abd pain, nausea or vomiting.       (14 Jun 2021 16:57)  Surgery/Hospital Course:  6/14 admit for melena w/ anemia, INR 8.84   6/15 Capsul study (+) for small bowel bleed, balloon endoscopy (old blood in prox ileum); post EGD - septic w/ L opacity, re-intubated for concern for aspiration, TTE (Mod MR, decrease biV w/ interventricular septum boweing towards R)  6/17 bronch   6/20 +C Diff   6/22 CT C/A/P: Fluid filled colon which may be 2/2 rapid transit. Small bilateral pleffs with associates. Compressive atelectasis New ISABELLE & LLL  parenchymal opacities, suspicious for pneumonia. Moderate stenosis in the proximal superior mesenteric artery.   6/25 #8 Shiley trach at bedside   7/1 LVAD speed increased to 9200  7/2 Bronch  7/20 TC since 7/7. Patient transferred to SDU.   7/23 INR today 2.64.  H&H 7.3/24 this AM.  Will repeat CBC at noon, and will send stool guaiac Patient with persistent abdominal tenderness, rate of tube feeds decreased.  No nausea/vomiting.    7/24 INR today 2.4. H&H 9.1/28.6 low flow overnight /N&V, refusing Tube feeds on D5 1/2 normal  @50 cc/hr  7/25 INR 2.69  H&H 7.7/25.1 refusing Tube feeds on D5 1/2 normal  @50 cc/hr. This am + BM Melena Dr Oneill HF  aware- PRBC x1  GI team consulted -  NPO  plan on study in am-  D/w Dr Cadet Patient  to return to CTU for further management; 1PRBCS   7/27 Post op INR 2.2 today.  No bleeding. BC + for SM.  Pt is hypotensive requiring pressor and inotropic support.  ID follow up today on Cefepime will follow.  7/26 R PTC for PTX   7/28 CT C/A/P: sub q emphysema in R chest wall, GGO RUL, small ascites CTH negative; Abd US: GB thickening, pericholecystic fluid    7/30 perc choley  7/31 Perchole drain in place continues to drain total output overnight 133.  Fever today 38.8   8/1 duplex LE negative   8/7 Patient with persistent abdominal pain, refusing tube feeds and medications, Psych consulted  8/13 CTA A/P ordered to r/o mesenteric ischemia 2/2 persistent anorexia, nausea, vomiting. Revealed:  Evaluation of the mesenteric vessels is limited by streak artifact from LVAD. There appears to be severe stenosis of the proximal SMA; abdominal mesenteric doppler is recommended for further evaluation. 2.  No small bowel findings to suggest acute mesenteric ischemia. 3.  Focal dissections involving the right and left common iliac arteries.  8/15: Cultures resulted BC 1/2 +GPC in clusters, SC enterobacter; mesenteric duplex: borderline stenosis of proximal SMA  8/27: CT C/A/P noncon: Nondular opacities in R lung apex w/cavitation, abd nl  8/31:  Continue current care, treatment of thyrotoxicosis with medications as per endocrine, d/c ABX as per team.   9/8 RUQ sono: Contracted gallbladder with cholecystostomy tube in place.  9/10 failed SMA stent, c/b RP bleed s/p 3U PRCB  9/12 CTA Abd/Pelvis L RP hematoma   9/24 Trach decannulated   9/26 intubated fro resp distress for increased WOB, LL pneumonia; CT C/A/P: progressive ISABELLE opacities and L consolidations, multifocal pneumonia   9/30 TTE (EF 25%, decreased RV, mod MR)   10/3 VT -> Lidocaine gtt   10/4 Trach size 6 DCT cuff  10/5 CT abd (neg for intra-abd abcess, severe stenosis of prox SMA and b/l iliac arteries)  10/18 SMA Stent   10/23 Emend for nausea   10/24 +Occult  10/29 FEES, RUQ ascites and pericholecystic fluid   Theckened liqwuids puree diet- calorie ct    Today:  Ambulation, TC, and PO diet as tolerated.  Day 2 of calorie count  Discussions yesterday as to discharge planning, agreeable to go to [facility] however will need to be decannulated and NGT removed.  Patient candidate for SDU.   11/4 Transferred to sdu.  Must be oob - chair for all meals.  Eventual change to uncuffed trach.  11/5 VSS; rsr/ st ; changed trach to #6 shiley cuffless this am by ENT- tolerated well; continue nutritional support with kaofeed; h/h stable 8/26 today   DNR rescinded today as per CHF/ LVAD team   11/6 VSS: pt tolerating supervised po diet; shantelle count in progress; trach care/suctioning q 3 hours; increase hydral 20 q8 as per CHF/LVAD team  11/7   OOB to chair,   vss   keofeed w/ shantelle count    dawn drain    ald 25 qd  added by HF,  no LVAD alarms  11/8 sluggish today, refusing meds.    Plan to hold TF and increase PO intake. If adequate intake plan to d/c ngt  11/9   Cont off TF, calorie count .  11/10 The patient is non compliant today, not stating reasons why Secretions , suction q2-3 Calorie count, remains off TF  11/11 Pending placement at [facility]. Pt was assessed for diet upgrade without PMV in place.   Easy to chew texture diet with Thin liquids via SMALL SINGLE SIPS. Maintain upright posture during/after eating for 30 mins; oral hygiene; position upright (90 degrees); small sips/bites. 100% supervision  11/12 Difficulty placing at [facility] , may  now work towards decannulation, PMV trials at bedside.  'Encourage PO intake.  11/13 VSS pt ate 100% dinner last evening per nsg staff. will cap during the day as goal is to eventually decannulate pt.

## 2021-11-13 NOTE — PROGRESS NOTE ADULT - SUBJECTIVE AND OBJECTIVE BOX
VITAL SIGNS-Telemetry:  SR   Vital Signs Last 24 Hrs  T(C): 36.3 (11-13-21 @ 03:04), Max: 37.1 (11-12-21 @ 22:00)  T(F): 97.3 (11-13-21 @ 03:04), Max: 98.8 (11-12-21 @ 22:00)  HR: 97 (11-13-21 @ 06:05) (97 - 118)  BP: --  RR: 18 (11-13-21 @ 06:20) (16 - 20)  SpO2: 100% (11-13-21 @ 06:20) (97% - 100%)         11-12 @ 07:01  -  11-13 @ 07:00  --------------------------------------------------------  IN: 1250 mL / OUT: 1175 mL / NET: 75 mL    Daily     Daily         Choley Drains:  [ x ] Drainage:   150/250cc total           PHYSICAL EXAM:  Neurology: alert and oriented x 3, nonfocal, no gross deficits  CV : +vad hum  Lungs: cta  Abdomen: soft, nontender, nondistended, positive bowel sounds, last bowel movement       11/12  Extremities:     no c/c/e     aspirin  chewable 81 milliGRAM(s) Oral daily  chlorhexidine 2% Cloths 1 Application(s) Topical <User Schedule>  ciprofloxacin     Tablet 500 milliGRAM(s) Oral every 12 hours  hydrALAZINE 25 milliGRAM(s) Oral every 8 hours  insulin lispro (ADMELOG) corrective regimen sliding scale   SubCutaneous three times a day before meals  insulin lispro (ADMELOG) corrective regimen sliding scale   SubCutaneous at bedtime  magnesium oxide 400 milliGRAM(s) Oral every 12 hours  methimazole 10 milliGRAM(s) Oral daily  metoclopramide Injectable 10 milliGRAM(s) IV Push every 8 hours  metoprolol succinate ER 50 milliGRAM(s) Oral daily  mirtazapine 7.5 milliGRAM(s) Oral at bedtime  ondansetron Injectable 4 milliGRAM(s) IV Push every 8 hours PRN  pantoprazole    Tablet 40 milliGRAM(s) Oral two times a day  sertraline 100 milliGRAM(s) Oral daily  spironolactone 25 milliGRAM(s) Oral daily  vancomycin    Solution 125 milliGRAM(s) Oral every 12 hours    Physical Therapy Rec:   Home  [  ]   Home w/ PT  [  ]  Rehab  [  ]  Discussed with Cardiothoracic Team at AM rounds.

## 2021-11-13 NOTE — PROGRESS NOTE ADULT - SUBJECTIVE AND OBJECTIVE BOX
RICKY JOINT  MRN#: [id number]  Subjective:  Pulmonary progress  : recurrent Acute hypoxic respiratory Failure ,aspiration pneumonia, NICM  ,   65M PMH ACC/AHA stage D HF due to NICM HM2 LVAD , TV annuloplasty ring 17 as destination therapy due to severe peripheral artery disease with significant stenosis  SIADH, Depression, CKD-3 with hyperkalemia, past E. coli UTIs, driveline drainage (21) and COVID-19 (back in 2020)  He was recently seen in clinic where he complained of abdominal pain and dark stools w constipation back in May. He presents to [facility] ER today weakness and fatigue, moderate and + Black stools for three days, on coumadin secondary to warfarin use in the setting of an LVAD. Patient has required transfusions for GIB in the past. Mostly recently back in 2021 pt had anemia with dark stools. No interventions was done at that time. However Last Endoscopy was done in 2020 (negative). Today labs show patient is anemic with H/H of 4.5/16.3,. INR is 8.84 MAP in the 90s, Temp 35.1. He denies any chest pain, shortness of breath, dizziness, abd pain, nausea or vomiting. found to have  rectal bleeding underwent endoscopy ,old blood in the proximal ileum ,  develop sepsis with LL opacity given Antibiotics , Extubated , reintubated , Bronchoscopy on Zosyn for LL pneumonia  and Amiodarone S/P TV Annuloplasty , patient remain intubated on full ventilatory support .S/P multiple units of blood transfusion , remain on full ventilatory support on Precedex and propofol , new central IJ line , diarrhea C diff. +ve on po vancomycin and IV Flagyl,  mildly distended belly , fever start on cefepime 2gm q 8 hrs S/P tracheostomy .  new RT Subclavian central line continue on contact  isolation ,S/P  C-diff antibiotics, no more diarrhea back on full support mechanical ventilator , chest x ray show improvement in LLL air space disease, more awake and responsive on tube feeding no more diarrhea ,  no nausea or vomiting or diarrhea still very weak and tired , back on tube feeding ,still on po vancomycin , getting PT and OT at bed side ,   , no SOB getting stronger , improve muscle tone patient transfer to monitor bed still on contact isolation for C-Difficel colitis on 50% FI02, and change to Suresh  tube feeding still loose stool . H/H drop significantly require blood transfusion , most likely GI bleeding , IV heparin D/C ,  H/H is stable ., patient develop TR sided  pneumothorax require chest tube placement , RT IJ central line  placed , develop fever shaking chills , blood culture positive for serratia marcescens , start on cefepime .the patient  become hypoxic and hypotensive placed on full ventilatory support and Vasopressin , levophed and Dobutamine ,S/P blood transfusion on meropenem and vancomycin ,    .S/P IR cholecystostomy tube drainage placement in the RT upper Quadrant , resume anticoagulation chest x ray noted   on methimazole for hyperthyroidism ,  condition is the same ,still C/O Abdominal pain , white count is improving , no chest pain or SOB ,  .placed on Reglan 10 mg TID for gastric motility , depressed , withdrawn. , S/P  mesenteric angiogram , unable to stent SMA S/P 3 units of blood transfusion   RT IJ in place reassessed for stent in the SMA by vascular,  dark stool stable H/H ,surgical opinion for possible Lap cholecystectomy. over night events noted develop respiratory distress, , rectal bleeding, require intubation placed on full ventilatory support , FI02 is 50% also became hemianosmically unstable require triple pressor support with levophed , vasopressin and norsynephrine, pan culture placed  on Vancomycin IV and PO  and Zosyn, sedated with propofol kept NPO , new central line RT and left IJ cathter placed .    , S/P  tracheostomy , ID and heart failure follow up appreciated , depresses no weaning for now , patient S/P  vascular procedure for superior mesenteric artery occlusion S/P 2 stent placement , patient tolerate it very well  ,  increase WBC , new RT UPE midline ,    WBC is improving   , S/P FEEST study result is pending , PO feeding with observation . on Cipro antibiotics  no major over night event  on monitor bed .start decannulation to cuffless trach . on 40% FI02, po feeding   , good saturation , on pure die D/C NG tube feeding  no plan for decannulation still significant secretion , better and more receptive for PO feeding and medication  still Flat Affect , po intake is better minimal secretion tolerating capping of the trach.     (2021 16:57)    PAST MEDICAL & SURGICAL HISTORY:  CHF (congestive heart failure)    CAD (coronary artery disease)  Depression    Pleural effusion    History of 2019 novel coronavirus disease (COVID-19)  2020    Hemorrhoids    Bleeding hemorrhoids    Peripheral arterial disease    Claudication    BPH with urinary obstruction    ACC/AHA stage D systolic heart failure  Anticoagulation goal of INR 2.0 to 2.5    Falls    Clavicle fracture    CKD (chronic kidney disease), stage III    Iron deficiency anemia    H/O epistaxis    Vertigo    GI bleed    S/P TVR (tricuspid valve repair)    S/P ventricular assist device    S/P endoscopy    OBJECTIVE:  ICU Vital Signs Last 24 Hrs  T(C): 38.2 (2021 10:00), Max: 38.5 (2021 12:00)  T(F): 100.8 (2021 10:00), Max: 101.3 (2021 12:00)  HR: 65 (2021 10:00) (61 - 69)  BP: --  BP(mean): --  ABP: 105/67 (2021 10:00) (90/54 - 113/64)  ABP(mean): 77 (2021 10:00) (63 - 77)  RR: 20 (2021 10:00) (19 - 35)  SpO2: 99% (2021 10:00) (96% - 100%)       @ 07: @ 07:00  --------------------------------------------------------  IN: 2693.9 mL / OUT: 1415 mL / NET: 1278.9 mL     @ 07: @ 10:49  --------------------------------------------------------  IN: 420.8 mL / OUT: 115 mL / NET: 305.8 mL  PHYSICAL EXAM: Daily   Elderly male  on trach. collar  FI02 is 40% S/P tracheostomy   Daily Weight in k.4 (2021 00:00)  HEENT:     + NCAT  + EOMI  - Conjuctival edema   - Icterus   - Thrush   - ETT  - NGT/OGT  Neck:         + FROM  + JVD  - Nodes - Masses + Mid-line trachea + Tracheostomy  Chest:            normal A-P diameter    Lungs:          + CTA   + Rhonchi    - Rales    - Wheezing + Decreased  LT BS   - Dullness R L  Cardiac:       + S1 + S2    + RRR   - Irregular   - S3  - S4    - Murmurs   - Rub   - Hamman’s sign   Abdomen:    + BS  + Soft + Non-tender - Distended - Organomegaly - PEG .cholecystostomy tube in place  Extremities:   - Cyanosis U/L   - Clubbing  U/L  + LE/UE Edema LUE hematoma   + Capillary refill    + Pulses   Neuro:        - Awake   -  Alert   - Confused   - Lethargic   + Sedated  + Generalized Weakness  Skin:        - Rashes    - Erythema   + Normal incisions   + IV sites intact          HOSPITAL MEDICATIONS: All medications reviewed and analyzed  MEDICATIONS  (STANDING):  amiodarone    Tablet 200 milliGRAM(s) Oral daily  chlorhexidine 0.12% Liquid 15 milliLiter(s) Oral Mucosa every 12 hours  chlorhexidine 2% Cloths 1 Application(s) Topical <User Schedule>  dexmedetomidine Infusion 0.5 MICROgram(s)/kG/Hr (9.81 mL/Hr) IV Continuous <Continuous>  dextrose 50% Injectable 50 milliLiter(s) IV Push every 15 minutes  heparin  Infusion 400 Unit(s)/Hr (12.5 mL/Hr) IV Continuous <Continuous>  Hydromorphone  Injectable 0.5 milliGRAM(s) IV Push once  insulin lispro (ADMELOG) corrective regimen sliding scale   SubCutaneous every 6 hours  pantoprazole  Injectable 40 milliGRAM(s) IV Push every 12 hours  piperacillin/tazobactam IVPB.. 3.375 Gram(s) IV Intermittent every 8 hours  propofol Infusion 20 MICROgram(s)/kG/Min (9.42 mL/Hr) IV Continuous <Continuous>  sodium chloride 0.9% lock flush 3 milliLiter(s) IV Push every 8 hours  sodium chloride 0.9%. 1000 milliLiter(s) (10 mL/Hr) IV Continuous <Continuous>    MEDICATIONS  (PRN):  acetaminophen    Suspension .. 650 milliGRAM(s) Oral every 6 hours PRN Temp greater or equal to 38C (100.4F)    LABS: All Lab data reviewed and analyzed                        9.9    5.26  )-----------( 225      ( 2021 06:55 )             31.0   -    134<L>  |  97  |  17  ----------------------------<  91  4.2   |  26  |  0.60    Ca    9.5      2021 06:55  Mg     1.9     11-12    TPro  7.8  /  Alb  3.7  /  TBili  0.4  /  DBili  x   /  AST  24  /  ALT  33  /  AlkPhos  167<H>        Ca    9.7      2021 04:38  Mg     1.9     11-12    TPro  8.2  /  Alb  3.8  /  TBili  0.4  /  DBili  x   /  AST  29  /  ALT  38  /  AlkPhos  184<H>      Ca    9.9      2021 07:14  Phos  3.5     11-10  Mg     1.8     11-10    TPro  8.2  /  Alb  3.8  /  TBili  0.5  /  DBili  x   /  AST  31  /  ALT  43  /  AlkPhos  191<H>  11-    Ca    9.6      10 Nov 2021 06:35  Phos  3.5     11-10  Mg     1.8     11-10                                                                                                                                                                                          PTT - ( 2021 04:52 )  PTT:45.2 sec LIVER FUNCTIONS - ( 2021 00:42 )  Alb: 3.4 g/dL / Pro: 6.7 g/dL / ALK PHOS: 213 U/L / ALT: 15 U/L / AST: 24 U/L / GGT: x           RADIOLOGY: - Reviewed and analyzed  , LVAD HM2, CT scan of abdomen reviewed result noted

## 2021-11-14 NOTE — PROGRESS NOTE ADULT - ASSESSMENT
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 coli UTIs, driveline drainage (1/7/21) and COVID-19 (back in April 2020)  He was recently seen in clinic where he complained of abdominal pain and dark stools w constipation back in May. He presents to [facility] ER today weakness and fatigue, moderate and + Black stools for three days, on coumadin secondary to warfarin use in the setting of an LVAD. Patient has required transfusions for GIB in the past. Mostly recently back in jan 2021 pt had anemia with dark stools. No interventions was done at that time. However Last Endoscopy was done in July 2020 (negative). Today labs show patient is anemic with H/H of 4.5/16.3,. INR is 8.84 MAP in the 90s, Temp 35.1. He denies any chest pain, shortness of breath, dizziness, abd pain, nausea or vomiting.       (14 Jun 2021 16:57)  Surgery/Hospital Course:  6/14 admit for melena w/ anemia, INR 8.84   6/15 Capsul study (+) for small bowel bleed, balloon endoscopy (old blood in prox ileum); post EGD - septic w/ L opacity, re-intubated for concern for aspiration, TTE (Mod MR, decrease biV w/ interventricular septum boweing towards R)  6/17 bronch   6/20 +C Diff   6/22 CT C/A/P: Fluid filled colon which may be 2/2 rapid transit. Small bilateral pleffs with associates. Compressive atelectasis New ISABELLE & LLL  parenchymal opacities, suspicious for pneumonia. Moderate stenosis in the proximal superior mesenteric artery.   6/25 #8 Shiley trach at bedside   7/1 LVAD speed increased to 9200  7/2 Bronch  7/20 TC since 7/7. Patient transferred to SDU.   7/23 INR today 2.64.  H&H 7.3/24 this AM.  Will repeat CBC at noon, and will send stool guaiac Patient with persistent abdominal tenderness, rate of tube feeds decreased.  No nausea/vomiting.    7/24 INR today 2.4. H&H 9.1/28.6 low flow overnight /N&V, refusing Tube feeds on D5 1/2 normal  @50 cc/hr  7/25 INR 2.69  H&H 7.7/25.1 refusing Tube feeds on D5 1/2 normal  @50 cc/hr. This am + BM Melena Dr Oneill HF  aware- PRBC x1  GI team consulted -  NPO  plan on study in am-  D/w Dr Cadet Patient  to return to CTU for further management; 1PRBCS   7/27 Post op INR 2.2 today.  No bleeding. BC + for SM.  Pt is hypotensive requiring pressor and inotropic support.  ID follow up today on Cefepime will follow.  7/26 R PTC for PTX   7/28 CT C/A/P: sub q emphysema in R chest wall, GGO RUL, small ascites CTH negative; Abd US: GB thickening, pericholecystic fluid    7/30 perc choley  7/31 Perchole drain in place continues to drain total output overnight 133.  Fever today 38.8   8/1 duplex LE negative   8/7 Patient with persistent abdominal pain, refusing tube feeds and medications, Psych consulted  8/13 CTA A/P ordered to r/o mesenteric ischemia 2/2 persistent anorexia, nausea, vomiting. Revealed:  Evaluation of the mesenteric vessels is limited by streak artifact from LVAD. There appears to be severe stenosis of the proximal SMA; abdominal mesenteric doppler is recommended for further evaluation. 2.  No small bowel findings to suggest acute mesenteric ischemia. 3.  Focal dissections involving the right and left common iliac arteries.  8/15: Cultures resulted BC 1/2 +GPC in clusters, SC enterobacter; mesenteric duplex: borderline stenosis of proximal SMA  8/27: CT C/A/P noncon: Nondular opacities in R lung apex w/cavitation, abd nl  8/31:  Continue current care, treatment of thyrotoxicosis with medications as per endocrine, d/c ABX as per team.   9/8 RUQ sono: Contracted gallbladder with cholecystostomy tube in place.  9/10 failed SMA stent, c/b RP bleed s/p 3U PRCB  9/12 CTA Abd/Pelvis L RP hematoma   9/24 Trach decannulated   9/26 intubated fro resp distress for increased WOB, LL pneumonia; CT C/A/P: progressive ISABELLE opacities and L consolidations, multifocal pneumonia   9/30 TTE (EF 25%, decreased RV, mod MR)   10/3 VT -> Lidocaine gtt   10/4 Trach size 6 DCT cuff  10/5 CT abd (neg for intra-abd abcess, severe stenosis of prox SMA and b/l iliac arteries)  10/18 SMA Stent   10/23 Emend for nausea   10/24 +Occult  10/29 FEES, RUQ ascites and pericholecystic fluid   Theckened liqwuids puree diet- calorie ct    Today:  Ambulation, TC, and PO diet as tolerated.  Day 2 of calorie count  Discussions yesterday as to discharge planning, agreeable to go to [facility] however will need to be decannulated and NGT removed.  Patient candidate for SDU.   11/4 Transferred to sdu.  Must be oob - chair for all meals.  Eventual change to uncuffed trach.  11/5 VSS; rsr/ st ; changed trach to #6 shiley cuffless this am by ENT- tolerated well; continue nutritional support with kaofeed; h/h stable 8/26 today   DNR rescinded today as per CHF/ LVAD team   11/6 VSS: pt tolerating supervised po diet; shantelle count in progress; trach care/suctioning q 3 hours; increase hydral 20 q8 as per CHF/LVAD team  11/7   OOB to chair,   vss   keofeed w/ shantelle count    dawn drain    ald 25 qd  added by HF,  no LVAD alarms  11/8 sluggish today, refusing meds.    Plan to hold TF and increase PO intake. If adequate intake plan to d/c ngt  11/9   Cont off TF, calorie count .  11/10 The patient is non compliant today, not stating reasons why Secretions , suction q2-3 Calorie count, remains off TF  11/11 Pending placement at [facility]. Pt was assessed for diet upgrade without PMV in place.   Easy to chew texture diet with Thin liquids via SMALL SINGLE SIPS. Maintain upright posture during/after eating for 30 mins; oral hygiene; position upright (90 degrees); small sips/bites. 100% supervision  11/12 Difficulty placing at [facility] , may  now work towards decannulation, PMV trials at bedside.  'Encourage PO intake.  11/13 VSS pt ate 100% dinner last evening per nsg staff. will cap during the day as goal is to eventually decannulate pt.  11/14 VSS - SW requesting COVID swab for possible d/c to rehab this week

## 2021-11-14 NOTE — PROGRESS NOTE ADULT - SUBJECTIVE AND OBJECTIVE BOX
VITAL SIGNS-Telemetry:  SR   Vital Signs Last 24 Hrs  T(C): 36.9 (11-14-21 @ 07:54), Max: 36.9 (11-13-21 @ 10:45)  T(F): 98.4 (11-14-21 @ 07:54), Max: 98.4 (11-13-21 @ 10:45)  HR: 95 (11-14-21 @ 07:54) (89 - 109)  BP: --  RR: 18 (11-14-21 @ 07:54) (15 - 20)  SpO2: 100% (11-14-21 @ 07:54) (99% - 100%)         11-13 @ 07:01  -  11-14 @ 07:00  --------------------------------------------------------  IN: 1617 mL / OUT: 1360 mL / NET: 257 mL    11-14 @ 07:01  -  11-14 @ 10:26  --------------------------------------------------------  IN: 240 mL / OUT: 500 mL / NET: -260 mL    Daily     Daily        PHYSICAL EXAM:  Neurology: alert and oriented x 3, nonfocal, no gross deficits  CV : S1S2  Sternal Wound : healed  Lungs: cta  Abdomen: soft, nontender, nondistended, positive bowel sounds, last bowel movement    11/13     Extremities:     no c/c/e    aspirin  chewable 81 milliGRAM(s) Oral daily  chlorhexidine 2% Cloths 1 Application(s) Topical <User Schedule>  ciprofloxacin     Tablet 500 milliGRAM(s) Oral every 12 hours  hydrALAZINE 25 milliGRAM(s) Oral every 8 hours  magnesium oxide 400 milliGRAM(s) Oral every 12 hours  methimazole 10 milliGRAM(s) Oral daily  metoclopramide Injectable 10 milliGRAM(s) IV Push every 8 hours  metoprolol succinate ER 50 milliGRAM(s) Oral daily  mirtazapine 7.5 milliGRAM(s) Oral at bedtime  ondansetron Injectable 4 milliGRAM(s) IV Push every 8 hours PRN  pantoprazole    Tablet 40 milliGRAM(s) Oral two times a day  sertraline 100 milliGRAM(s) Oral daily  spironolactone 25 milliGRAM(s) Oral daily  vancomycin    Solution 125 milliGRAM(s) Oral every 12 hours    Physical Therapy Rec:   Home  [  ]   Home w/ PT  [  ]  Rehab  [ x ]  Discussed with Cardiothoracic Team at AM rounds.

## 2021-11-14 NOTE — PROGRESS NOTE ADULT - ASSESSMENT
Assessment and Recommendation:   · Assessment	  Assessment and recommendation :  Recurrent Acute hypoxic respiratory Failure  FI02 is 40% S/P tracheostomy  on track. collar   Acute blood loss anemia S/P multiple  blood transfusion post tracheostomy   start Decannulation to cuffless trach.size 6   sepsis   on Cipro tablets   po vancomycin   C-Dificle colitis on po vancomycin   S/P cholecystostomy tube placement by IR    to repeat Abdominal ultra sound  patient is S/P  repeat vacular procedure and SMA stent   AF RVR back to regular sinus Rhythm   Non ischemic cardiomyopathy continue ACE inhibitor and B-Blockers   Stage D systolic heart failure S/P LVAD HM2   MH2 LVAD  with  TV Annuloplasty  Severe peripheral vascular disease   severe hyperglycemia on insulin coverage    Reglan 10 mg for Gastric Motility   hyperthyroidism on Methimazole 10mg TID   critical care polyneuropathy   Anemia of Acute blood Loss   severe protein caloric malnutrition   magnesium supplement keep above 2   Chronic kidney disease stage III  Depressed and withdrawn   on   PO feeding  with pure diet   start decannulate down to Shiley 6 cuffless   GI prophylaxis with PPI

## 2021-11-14 NOTE — PROGRESS NOTE ADULT - SUBJECTIVE AND OBJECTIVE BOX

## 2021-11-15 NOTE — PROVIDER CONTACT NOTE (OTHER) - SITUATION
Pt with increased oral secretions appearing to be possibly tube feeds, residual checked and was ~550
PT was Tachycardic, febrile, tachypnic, and had chills following plasma transfusion.
patient awake and alert
Pt tachypneic and overbreathing ventilator, RR 20-30's. ABG sent, pH 7.52, CO2 23, pO2 63, HCO3 19.
Pt being transferred to CTU
pt sitting in chair MAP noted 50
Pt with HM2 LVAD flows decreased from 5.0 to 2.9-3.5 with MAP 52-65. Pt tachycardic 120s-130s.

## 2021-11-15 NOTE — PROVIDER CONTACT NOTE (OTHER) - REASON
hypotensive with decreased LVAD flows
Overbreathing the vent and abnormal ABG results
pt sitting in chair MAP noted 50
Increased residuals
Pt being transferred to CTU
patient removed ng tube
transfusion reaction

## 2021-11-15 NOTE — PROVIDER CONTACT NOTE (OTHER) - NAME OF MD/NP/PA/DO NOTIFIED:
Dr. Dona Perry
PATIENCE BENITEZ
Rubia Redmond, NP
marcy perales
CLAUDIA Perez
Carloz Burnett, PA
Shadylas

## 2021-11-15 NOTE — PROGRESS NOTE ADULT - ATTENDING COMMENTS
67 YO M with a history of stage D NICM s/p HM2 on 9/2017 who was admitted with abdominal pain and has had a long protracted course complicated by GI bleed, respiratory failure s/p trach, multiple infection, abdominal pain s/p cholecystostomy tube, and now s/p SMA stent  Currently patient is tolerating food and is waiting on disposition to [facility]  c/w toprol XL 50 mg QD, hydralazine 25mg PO Q8 and spironolactone 25mg PO Q24  on cipro 500mg daily for ppx

## 2021-11-15 NOTE — PROGRESS NOTE ADULT - SUBJECTIVE AND OBJECTIVE BOX
VITAL SIGNS-STABLE SR        PHYSICAL EXAM:  Neurology: alert and oriented x 3, nonfocal, no gross deficits  CV : +vad hum  Lungs: cta  Abdomen: soft, nontender, nondistended, positive bowel sounds, last bowel movement         Extremities:  ni c/c/e       aspirin  chewable 81 milliGRAM(s) Oral daily  chlorhexidine 2% Cloths 1 Application(s) Topical <User Schedule>  ciprofloxacin     Tablet 500 milliGRAM(s) Oral every 12 hours  hydrALAZINE 25 milliGRAM(s) Oral every 8 hours  magnesium oxide 400 milliGRAM(s) Oral every 12 hours  methimazole 10 milliGRAM(s) Oral daily  metoclopramide Injectable 10 milliGRAM(s) IV Push every 8 hours  metoprolol succinate ER 50 milliGRAM(s) Oral daily  mirtazapine 7.5 milliGRAM(s) Oral at bedtime  ondansetron Injectable 4 milliGRAM(s) IV Push every 8 hours PRN  pantoprazole    Tablet 40 milliGRAM(s) Oral two times a day  sertraline 100 milliGRAM(s) Oral daily  spironolactone 25 milliGRAM(s) Oral daily  vancomycin    Solution 125 milliGRAM(s) Oral every 12 hours      Physical Therapy Rec:   Home  [  ]   Home w/ PT  [  ]  Rehab  [  x]  Discussed with Cardiothoracic Team at AM rounds.

## 2021-11-15 NOTE — PROVIDER CONTACT NOTE (OTHER) - BACKGROUND
PT was given 1 unit of plasma for elevated INR.
HM2 LVAD implanted 9/12/2017, admitted for anemia/melena
LVAD admitted with melena,anemia
pmhx of HM2 LVAD placed 2017, CAD, BPH, CKD, COVID (4/2020)
Pt with HM2 LVAD, flows overnight 5.0-5.5. MAP goal >65.
patient on heartmate 2 , on trach collar
pmhx of HM2 LVAD placed 2017, CAD, BPH, CKD, COVID (4/2020)

## 2021-11-15 NOTE — PROGRESS NOTE ADULT - PROBLEM SELECTOR PLAN 1
- ACC/AHA stage D systolic heart failure s/p LVAD   - Continue with Toprol XL 50 mg QD and hydralazine 25mg PO Q8.  - continue with Spironolactone 25mg PO Q24  - Encourage ambulation, PT.

## 2021-11-15 NOTE — PROVIDER CONTACT NOTE (OTHER) - DATE AND TIME:
24-Jun-2021 21:20
15-Nov-2021 15:15
25-Jul-2021 12:40
13-Jul-2021 09:30
26-Jul-2021 05:15
24-Jun-2021 23:45
03-Jul-2021 14:00

## 2021-11-15 NOTE — PROGRESS NOTE ADULT - ASSESSMENT
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 coli UTIs, driveline drainage (1/7/21) and COVID-19 (back in April 2020)  He was recently seen in clinic where he complained of abdominal pain and dark stools w constipation back in May. He presents to [facility] ER today weakness and fatigue, moderate and + Black stools for three days, on coumadin secondary to warfarin use in the setting of an LVAD. Patient has required transfusions for GIB in the past. Mostly recently back in jan 2021 pt had anemia with dark stools. No interventions was done at that time. However Last Endoscopy was done in July 2020 (negative). Today labs show patient is anemic with H/H of 4.5/16.3,. INR is 8.84 MAP in the 90s, Temp 35.1. He denies any chest pain, shortness of breath, dizziness, abd pain, nausea or vomiting.       (14 Jun 2021 16:57)  Surgery/Hospital Course:  6/14 admit for melena w/ anemia, INR 8.84   6/15 Capsul study (+) for small bowel bleed, balloon endoscopy (old blood in prox ileum); post EGD - septic w/ L opacity, re-intubated for concern for aspiration, TTE (Mod MR, decrease biV w/ interventricular septum boweing towards R)  6/17 bronch   6/20 +C Diff   6/22 CT C/A/P: Fluid filled colon which may be 2/2 rapid transit. Small bilateral pleffs with associates. Compressive atelectasis New ISABELLE & LLL  parenchymal opacities, suspicious for pneumonia. Moderate stenosis in the proximal superior mesenteric artery.   6/25 #8 Shiley trach at bedside   7/1 LVAD speed increased to 9200  7/2 Bronch  7/20 TC since 7/7. Patient transferred to SDU.   7/23 INR today 2.64.  H&H 7.3/24 this AM.  Will repeat CBC at noon, and will send stool guaiac Patient with persistent abdominal tenderness, rate of tube feeds decreased.  No nausea/vomiting.    7/24 INR today 2.4. H&H 9.1/28.6 low flow overnight /N&V, refusing Tube feeds on D5 1/2 normal  @50 cc/hr  7/25 INR 2.69  H&H 7.7/25.1 refusing Tube feeds on D5 1/2 normal  @50 cc/hr. This am + BM Melena Dr Oneill HF  aware- PRBC x1  GI team consulted -  NPO  plan on study in am-  D/w Dr Cadet Patient  to return to CTU for further management; 1PRBCS   7/27 Post op INR 2.2 today.  No bleeding. BC + for SM.  Pt is hypotensive requiring pressor and inotropic support.  ID follow up today on Cefepime will follow.  7/26 R PTC for PTX   7/28 CT C/A/P: sub q emphysema in R chest wall, GGO RUL, small ascites CTH negative; Abd US: GB thickening, pericholecystic fluid    7/30 perc choley  7/31 Perchole drain in place continues to drain total output overnight 133.  Fever today 38.8   8/1 duplex LE negative   8/7 Patient with persistent abdominal pain, refusing tube feeds and medications, Psych consulted  8/13 CTA A/P ordered to r/o mesenteric ischemia 2/2 persistent anorexia, nausea, vomiting. Revealed:  Evaluation of the mesenteric vessels is limited by streak artifact from LVAD. There appears to be severe stenosis of the proximal SMA; abdominal mesenteric doppler is recommended for further evaluation. 2.  No small bowel findings to suggest acute mesenteric ischemia. 3.  Focal dissections involving the right and left common iliac arteries.  8/15: Cultures resulted BC 1/2 +GPC in clusters, SC enterobacter; mesenteric duplex: borderline stenosis of proximal SMA  8/27: CT C/A/P noncon: Nondular opacities in R lung apex w/cavitation, abd nl  8/31:  Continue current care, treatment of thyrotoxicosis with medications as per endocrine, d/c ABX as per team.   9/8 RUQ sono: Contracted gallbladder with cholecystostomy tube in place.  9/10 failed SMA stent, c/b RP bleed s/p 3U PRCB  9/12 CTA Abd/Pelvis L RP hematoma   9/24 Trach decannulated   9/26 intubated fro resp distress for increased WOB, LL pneumonia; CT C/A/P: progressive ISABELLE opacities and L consolidations, multifocal pneumonia   9/30 TTE (EF 25%, decreased RV, mod MR)   10/3 VT -> Lidocaine gtt   10/4 Trach size 6 DCT cuff  10/5 CT abd (neg for intra-abd abcess, severe stenosis of prox SMA and b/l iliac arteries)  10/18 SMA Stent   10/23 Emend for nausea   10/24 +Occult  10/29 FEES, RUQ ascites and pericholecystic fluid   Theckened liqwuids puree diet- calorie ct    Today:  Ambulation, TC, and PO diet as tolerated.  Day 2 of calorie count  Discussions yesterday as to discharge planning, agreeable to go to [facility] however will need to be decannulated and NGT removed.  Patient candidate for SDU.   11/4 Transferred to sdu.  Must be oob - chair for all meals.  Eventual change to uncuffed trach.  11/5 VSS; rsr/ st ; changed trach to #6 shiley cuffless this am by ENT- tolerated well; continue nutritional support with kaofeed; h/h stable 8/26 today   DNR rescinded today as per CHF/ LVAD team   11/6 VSS: pt tolerating supervised po diet; shantelle count in progress; trach care/suctioning q 3 hours; increase hydral 20 q8 as per CHF/LVAD team  11/7   OOB to chair,   vss   keofeed w/ shantelle count    dawn drain    ald 25 qd  added by HF,  no LVAD alarms  11/8 sluggish today, refusing meds.    Plan to hold TF and increase PO intake. If adequate intake plan to d/c ngt  11/9   Cont off TF, calorie count .  11/10 The patient is non compliant today, not stating reasons why Secretions , suction q2-3 Calorie count, remains off TF  11/11 Pending placement at [facility]. Pt was assessed for diet upgrade without PMV in place.   Easy to chew texture diet with Thin liquids via SMALL SINGLE SIPS. Maintain upright posture during/after eating for 30 mins; oral hygiene; position upright (90 degrees); small sips/bites. 100% supervision  11/12 Difficulty placing at [facility] , may  now work towards decannulation, PMV trials at bedside.  'Encourage PO intake.  11/13 VSS pt ate 100% dinner last evening per nsg staff. will cap during the day as goal is to eventually decannulate pt.  11/14 VSS - SW requesting COVID swab for possible d/c to rehab this week  11/15 vss awaiting [facility]

## 2021-11-15 NOTE — PROGRESS NOTE ADULT - ASSESSMENT
67 YO M with a history of stage D NICM s/p HM2 on 9/2017 as DT (due to severe PAD) with TV ring, prior COVID-19 infection 4/2020, recurrent syncope post LVAD s/p ILR, and chronic abdominal pain with prior negative workup who was admitted 6/14/21 with symptomatic anemia with Hgb 4.5 in setting of INR 8.8 without hemodynamic instability and has since had a protracted hospitalization. He was transfused and underwent VCE which showed active bleeding in the mid small bowel but subsequent enteroscopy 6/15 did not reveal any active bleeding. He acutely decompensated after procedure with fever/hypertension, low flow alarms, and pulmonary infiltrate with hypoxia requiring intubation from probable aspiration PNA. He was unable to extubated and has since undergone tracheostomy but tolerating persistent trach collar and nearing ability for decannulation. 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 He underwent angiogram on 9/10, stent was unable to be placed and course was then complicated by RP bleed. He continued to have persistent leukocytosis and febrile episodes and was noted to have positive sputum culture for serratia marcescens and completed his course of abx. He was initially decannulated on 9/23. Recently he started having multiples of melena, emesis and went into acte respiratory distress and was ultimately re-intubated on 9/26.     He had blood cultures positive for enterobacter cloacae/serratia and sputum positive for stenotrophomonas remains on IV antibiotics which are completing on 11/18. He is s/p trach with ENT on 10/4. He underwent SMA stent x 2 on 10/18. His nausea and vomiting has improved, and he is now tolerating PO diet along with tube feeds. His DNR/DNI has been rescinded. Diet has been advanced and keotube has been removed. He is currently tolerating trach collar. Current plan is for him to be discharged to rehab with trach in place and once stronger will return for decannulation.     Discussed his need for COVID vaccine- but patient is declining  He agrees to have his COVID antibodies checked.    Morris Prater MD  [phone]  After 5pm/weekends [phone]

## 2021-11-15 NOTE — PROGRESS NOTE ADULT - ASSESSMENT
Assessment and Recommendation:   · Assessment	  Assessment and recommendation :  Recurrent Acute hypoxic respiratory Failure  FI02 is 40% S/P tracheostomy  on track. collar with speaking valve   Acute blood loss anemia S/P multiple  blood transfusion post tracheostomy   start Decannulation to cuffless trach.size 6 capping with speaker valve   sepsis   on Cipro tablets   po vancomycin   C-Dificle colitis on po vancomycin   S/P cholecystostomy tube placement by IR    to repeat Abdominal ultra sound  patient is S/P  repeat vacular procedure and SMA stent   AF RVR back to regular sinus Rhythm   Non ischemic cardiomyopathy continue ACE inhibitor and B-Blockers   Stage D systolic heart failure S/P LVAD HM2   MH2 LVAD  with  TV Annuloplasty  Severe peripheral vascular disease   severe hyperglycemia on insulin coverage    Reglan 10 mg for Gastric Motility   hyperthyroidism on Methimazole 10mg TID   critical care polyneuropathy   Anemia of Acute blood Loss   severe protein caloric malnutrition   magnesium supplement keep above 2   Chronic kidney disease stage III  Depressed and withdrawn   on   PO feeding  with pure diet   start decannulate down to Shiley 6 cuffless   GI prophylaxis with PPI

## 2021-11-15 NOTE — PROGRESS NOTE ADULT - ASSESSMENT
67 YO M with a history of stage D NICM s/p HM2 on 9/2017 as DT (due to severe PAD) with TV ring, prior COVID-19 infection 4/2020, recurrent syncope post LVAD s/p ILR, and chronic abdominal pain with prior negative workup who was admitted 6/14/21 with symptomatic anemia with Hgb 4.5 in setting of INR 8.8 without hemodynamic instability and has since had a protracted hospitalization. He was transfused and underwent VCE which showed active bleeding in the mid small bowel but subsequent enteroscopy 6/15 did not reveal any active bleeding. He acutely decompensated after procedure with fever/hypertension, low flow alarms, and pulmonary infiltrate with hypoxia requiring intubation from probable aspiration PNA. He was unable to extubated and has since undergone tracheostomy but tolerating persistent trach collar and nearing ability for decannulation. 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 He underwent angiogram on 9/10, stent was unable to be placed and course was then complicated by RP bleed. He continued to have persistent leukocytosis and febrile episodes and was noted to have positive sputum culture for serratia marcescens and completed his course of abx. He was initially decannulated on 9/23. Recently he started having multiples of melena, emesis and went into acte respiratory distress and was ultimately re-intubated on 9/26.     He had blood cultures positive for enterobacter cloacae/serratia and sputum positive for stenotrophomonas remains on IV antibiotics. He is s/p trach with ENT on 10/4. He underwent SMA stent x 2 on 10/18. His nausea and vomiting has improved, and he is now tolerating PO diet along with tube feeds. His DNR/DNI has been rescinded. Diet has been advanced and keotube has been removed. He is currently tolerating trach collar. Current plan is for him to be discharged to rehab with trach in place and once stronger will return for decannulation.

## 2021-11-15 NOTE — PROGRESS NOTE ADULT - SUBJECTIVE AND OBJECTIVE BOX
INFECTIOUS DISEASES FOLLOW UP-- Anitra Prater  [phone]    This is a follow up note for this  66yMale with  Anemia    trach removed  slowly improving        ROS:  CONSTITUTIONAL:  No fever, fair appetite  CARDIOVASCULAR:  No chest pain or palpitations  RESPIRATORY:  No dyspnea  GASTROINTESTINAL:  No nausea, vomiting, diarrhea, or abdominal pain  GENITOURINARY:  No dysuria  NEUROLOGIC:  No headache,     Allergies    Amiodarone Hydrochloride (Other (Severe))    Intolerances        ANTIBIOTICS/RELEVANT:  antimicrobials  ciprofloxacin     Tablet 500 milliGRAM(s) Oral every 12 hours  vancomycin    Solution 125 milliGRAM(s) Oral every 12 hours    immunologic:    OTHER:  aspirin  chewable 81 milliGRAM(s) Oral daily  chlorhexidine 2% Cloths 1 Application(s) Topical <User Schedule>  hydrALAZINE 25 milliGRAM(s) Oral every 8 hours  magnesium oxide 400 milliGRAM(s) Oral every 12 hours  methimazole 10 milliGRAM(s) Oral daily  metoclopramide Injectable 10 milliGRAM(s) IV Push every 8 hours  metoprolol succinate ER 50 milliGRAM(s) Oral daily  mirtazapine 7.5 milliGRAM(s) Oral at bedtime  ondansetron Injectable 4 milliGRAM(s) IV Push every 8 hours PRN  pantoprazole    Tablet 40 milliGRAM(s) Oral two times a day  sertraline 100 milliGRAM(s) Oral daily  spironolactone 25 milliGRAM(s) Oral daily      Objective:  Vital Signs Last 24 Hrs  T(C): 36.7 (15 Nov 2021 15:36), Max: 37.6 (14 Nov 2021 19:05)  T(F): 98 (15 Nov 2021 15:36), Max: 99.6 (14 Nov 2021 19:05)  HR: 110 (15 Nov 2021 16:50) (95 - 110)  BP: --  BP(mean): 64 (15 Nov 2021 16:50) (50 - 87)  RR: 18 (15 Nov 2021 16:50) (18 - 18)  SpO2: 100% (15 Nov 2021 16:50) (100% - 100%)    PHYSICAL EXAM:  Constitutional:no acute distress  Eyes:ALONSO, EOMI  Ear/Nose/Throat: no oral lesions, old trach site covered	  Respiratory: clear BL  Cardiovascular: S1S2 VAD sounds  Gastrointestinal:soft, (+) BS, no tenderness  Extremities:no e/e/c  No Lymphadenopathy  IV sites not inflammed.    LABS:                        9.9    5.90  )-----------( 222      ( 15 Nov 2021 05:49 )             31.2     11-15    131<L>  |  96  |  19  ----------------------------<  84  4.2   |  24  |  0.62    Ca    9.8      15 Nov 2021 05:50  Phos  3.5     11-14  Mg     1.8     11-14            MICROBIOLOGY:            RECENT CULTURES:      RADIOLOGY & ADDITIONAL STUDIES:    < from: Xray Chest 1 View- PORTABLE-Routine (Xray Chest 1 View- PORTABLE-Routine in AM.) (11.08.21 @ 04:55) >    Enteric tube with tip in the proximal stomach.  Additional supportive devices as above.  Clear lungs.    < end of copied text >

## 2021-11-15 NOTE — PROVIDER CONTACT NOTE (OTHER) - ASSESSMENT
Pt A&Ox4. TC@ 40% via #7 Portex, no sxs of resp distress. 1 episode of melena today. H&H trending down- 1 unit PRBC transfusing now.
pt asymptomatic,denies pain or disomfort
Pt hypotensive with MAP 52-65 on nabil, correlated with manual LUE cuff pressure. Tachycardic 120s-130w. Flows decreased from 5.0 to 2.9-3.5.
PT was reassessed 30 minutes following reaction and pt was tachycardic, tachypnic, and febrile. PT was shivering and stated he was cold.
patient alert, patient refuse to replace new ng tube
pt sedated on 1mcg of precedex and 2.25mg ketamine, tachypneic with RR in high 20's to high 30's. large amounts of secretions down ETT.
residual Tube feeds checked after pt had large amount of oral secretions that appeared to be tube feed colored. Tube feed stopped, residual found to be 550cc.

## 2021-11-15 NOTE — PROVIDER CONTACT NOTE (OTHER) - RECOMMENDATIONS
ask np to talk to the patient ,p atient on npo
Transfusion reaction suspected
CLAUDIA Perez at bedside to assess, TF stopped-pt NPO after MN.
hold hydralizine,observe
Admin fluids to increase flow
MD assess patient at bedside, increase sedation for vent synchrony, increase fio2 and repeat ABG in 30 min.

## 2021-11-15 NOTE — PROGRESS NOTE ADULT - SUBJECTIVE AND OBJECTIVE BOX

## 2021-11-15 NOTE — PROGRESS NOTE ADULT - SUBJECTIVE AND OBJECTIVE BOX
Subjective: Patient seen and examined resting in bed.     Medications:  aspirin  chewable 81 milliGRAM(s) Oral daily  chlorhexidine 2% Cloths 1 Application(s) Topical <User Schedule>  ciprofloxacin     Tablet 500 milliGRAM(s) Oral every 12 hours  hydrALAZINE 25 milliGRAM(s) Oral every 8 hours  magnesium oxide 400 milliGRAM(s) Oral every 12 hours  methimazole 10 milliGRAM(s) Oral daily  metoclopramide Injectable 10 milliGRAM(s) IV Push every 8 hours  metoprolol succinate ER 50 milliGRAM(s) Oral daily  mirtazapine 7.5 milliGRAM(s) Oral at bedtime  ondansetron Injectable 4 milliGRAM(s) IV Push every 8 hours PRN  pantoprazole    Tablet 40 milliGRAM(s) Oral two times a day  sertraline 100 milliGRAM(s) Oral daily  spironolactone 25 milliGRAM(s) Oral daily  vancomycin    Solution 125 milliGRAM(s) Oral every 12 hours    Vitals:  Vital Signs Last 24 Hours  T(C): 37.3 (11-15-21 @ 09:57), Max: 37.6 (21 @ 19:05)  HR: 102 (11-15-21 @ 11:20) (95 - 110)  BP: --  RR: 18 (11-15-21 @ 09:57) (18 - 18)  SpO2: 100% (11-15-21 @ 09:57) (100% - 100%)    Weight in k.1 (11-15 @ 07:28)    I&O's Summary    2021 07:  -  15 Nov 2021 07:00  --------------------------------------------------------  IN: 1320 mL / OUT: 1810 mL / NET: -490 mL    15 Nov 2021 07:  -  15 Nov 2021 12:26  --------------------------------------------------------  IN: 360 mL / OUT: 310 mL / NET: 50 mL      Physical Exam  General: No distress. Comfortable.  HEENT: EOM intact.  Neck: +trach collar   Chest: Clear to auscultation bilaterally  CV: +VAD hum  Abdomen: Soft, non-distended, non-tender, +bilary drain   Neurology: Alert and oriented times three.    LVAD Interrogation  VAD TYPE Hm 2  Speed 9200  Flow 4.6  Power 5.2  PI 6.3  Assessment of driveline exit site: driveline dressing c/d/i  Programming changes: no changes made    Labs:                        9.9    5.90  )-----------( 222      ( 15 Nov 2021 05:49 )             31.2     11-15    131<L>  |  96  |  19  ----------------------------<  84  4.2   |  24  |  0.62    Ca    9.8      15 Nov 2021 05:50  Phos  3.5     11-14  Mg     1.8     11-14                Lactate Dehydrogenase, Serum: 257 U/L (11-15 @ 05:50)  Lactate Dehydrogenase, Serum: 254 U/L ( @ 06:24)

## 2021-11-16 NOTE — PROGRESS NOTE ADULT - PROBLEM/PLAN-5
DISPLAY PLAN FREE TEXT

## 2021-11-16 NOTE — PROGRESS NOTE ADULT - ASSESSMENT
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 coli UTIs, driveline drainage (1/7/21) and COVID-19 (back in April 2020)  He was recently seen in clinic where he complained of abdominal pain and dark stools w constipation back in May. He presents to [facility] ER today weakness and fatigue, moderate and + Black stools for three days, on coumadin secondary to warfarin use in the setting of an LVAD. Patient has required transfusions for GIB in the past. Mostly recently back in jan 2021 pt had anemia with dark stools. No interventions was done at that time. However Last Endoscopy was done in July 2020 (negative). Today labs show patient is anemic with H/H of 4.5/16.3,. INR is 8.84 MAP in the 90s, Temp 35.1. He denies any chest pain, shortness of breath, dizziness, abd pain, nausea or vomiting.       (14 Jun 2021 16:57)  Surgery/Hospital Course:  6/14 admit for melena w/ anemia, INR 8.84   6/15 Capsul study (+) for small bowel bleed, balloon endoscopy (old blood in prox ileum); post EGD - septic w/ L opacity, re-intubated for concern for aspiration, TTE (Mod MR, decrease biV w/ interventricular septum boweing towards R)  6/17 bronch   6/20 +C Diff   6/22 CT C/A/P: Fluid filled colon which may be 2/2 rapid transit. Small bilateral pleffs with associates. Compressive atelectasis New ISABELLE & LLL  parenchymal opacities, suspicious for pneumonia. Moderate stenosis in the proximal superior mesenteric artery.   6/25 #8 Shiley trach at bedside   7/1 LVAD speed increased to 9200  7/2 Bronch  7/20 TC since 7/7. Patient transferred to SDU.   7/23 INR today 2.64.  H&H 7.3/24 this AM.  Will repeat CBC at noon, and will send stool guaiac Patient with persistent abdominal tenderness, rate of tube feeds decreased.  No nausea/vomiting.    7/24 INR today 2.4. H&H 9.1/28.6 low flow overnight /N&V, refusing Tube feeds on D5 1/2 normal  @50 cc/hr  7/25 INR 2.69  H&H 7.7/25.1 refusing Tube feeds on D5 1/2 normal  @50 cc/hr. This am + BM Melena Dr Oneill HF  aware- PRBC x1  GI team consulted -  NPO  plan on study in am-  D/w Dr Cadet Patient  to return to CTU for further management; 1PRBCS   7/27 Post op INR 2.2 today.  No bleeding. BC + for SM.  Pt is hypotensive requiring pressor and inotropic support.  ID follow up today on Cefepime will follow.  7/26 R PTC for PTX   7/28 CT C/A/P: sub q emphysema in R chest wall, GGO RUL, small ascites CTH negative; Abd US: GB thickening, pericholecystic fluid    7/30 perc choley  7/31 Perchole drain in place continues to drain total output overnight 133.  Fever today 38.8   8/1 duplex LE negative   8/7 Patient with persistent abdominal pain, refusing tube feeds and medications, Psych consulted  8/13 CTA A/P ordered to r/o mesenteric ischemia 2/2 persistent anorexia, nausea, vomiting. Revealed:  Evaluation of the mesenteric vessels is limited by streak artifact from LVAD. There appears to be severe stenosis of the proximal SMA; abdominal mesenteric doppler is recommended for further evaluation. 2.  No small bowel findings to suggest acute mesenteric ischemia. 3.  Focal dissections involving the right and left common iliac arteries.  8/15: Cultures resulted BC 1/2 +GPC in clusters, SC enterobacter; mesenteric duplex: borderline stenosis of proximal SMA  8/27: CT C/A/P noncon: Nondular opacities in R lung apex w/cavitation, abd nl  8/31:  Continue current care, treatment of thyrotoxicosis with medications as per endocrine, d/c ABX as per team.   9/8 RUQ sono: Contracted gallbladder with cholecystostomy tube in place.  9/10 failed SMA stent, c/b RP bleed s/p 3U PRCB  9/12 CTA Abd/Pelvis L RP hematoma   9/24 Trach decannulated   9/26 intubated fro resp distress for increased WOB, LL pneumonia; CT C/A/P: progressive ISABELEL opacities and L consolidations, multifocal pneumonia   9/30 TTE (EF 25%, decreased RV, mod MR)   10/3 VT -> Lidocaine gtt   10/4 Trach size 6 DCT cuff  10/5 CT abd (neg for intra-abd abcess, severe stenosis of prox SMA and b/l iliac arteries)  10/18 SMA Stent   10/23 Emend for nausea   10/24 +Occult  10/29 FEES, RUQ ascites and pericholecystic fluid   Theckened liqwuids puree diet- calorie ct    Today:  Ambulation, TC, and PO diet as tolerated.  Day 2 of calorie count  Discussions yesterday as to discharge planning, agreeable to go to [facility] however will need to be decannulated and NGT removed.  Patient candidate for SDU.   11/4 Transferred to sdu.  Must be oob - chair for all meals.  Eventual change to uncuffed trach.  11/5 VSS; rsr/ st ; changed trach to #6 shiley cuffless this am by ENT- tolerated well; continue nutritional support with kaofeed; h/h stable 8/26 today   DNR rescinded today as per CHF/ LVAD team   11/6 VSS: pt tolerating supervised po diet; shantelle count in progress; trach care/suctioning q 3 hours; increase hydral 20 q8 as per CHF/LVAD team  11/7   OOB to chair,   vss   keofeed w/ shantelle count    dawn drain    ald 25 qd  added by HF,  no LVAD alarms  11/8 sluggish today, refusing meds.    Plan to hold TF and increase PO intake. If adequate intake plan to d/c ngt  11/9   Cont off TF, calorie count .  11/10 The patient is non compliant today, not stating reasons why Secretions , suction q2-3 Calorie count, remains off TF  11/11 Pending placement at [facility]. Pt was assessed for diet upgrade without PMV in place.   Easy to chew texture diet with Thin liquids via SMALL SINGLE SIPS. Maintain upright posture during/after eating for 30 mins; oral hygiene; position upright (90 degrees); small sips/bites. 100% supervision  11/12 Difficulty placing at [facility] , may  now work towards decannulation, PMV trials at bedside.  'Encourage PO intake.  11/13 VSS pt ate 100% dinner last evening per nsg staff. will cap during the day as goal is to eventually decannulate pt.  11/14 VSS - SW requesting COVID swab for possible d/c to rehab this week  11/16 vss - accepted to [facility] - await available bed & insurance authorization from [facility].

## 2021-11-16 NOTE — PROGRESS NOTE ADULT - ASSESSMENT
65 YO M with a history of stage D NICM s/p HM2 on 9/2017 as DT (due to severe PAD) with TV ring, prior COVID-19 infection 4/2020, recurrent syncope post LVAD s/p ILR, and chronic abdominal pain with prior negative workup who was admitted 6/14/21 with symptomatic anemia with Hgb 4.5 in setting of INR 8.8 without hemodynamic instability and has since had a protracted hospitalization. He was transfused and underwent VCE which showed active bleeding in the mid small bowel but subsequent enteroscopy 6/15 did not reveal any active bleeding. He acutely decompensated after procedure with fever/hypertension, low flow alarms, and pulmonary infiltrate with hypoxia requiring intubation from probable aspiration PNA. He was unable to extubated and has since undergone tracheostomy but tolerating persistent trach collar and nearing ability for decannulation. 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 He underwent angiogram on 9/10, stent was unable to be placed and course was then complicated by RP bleed. He continued to have persistent leukocytosis and febrile episodes and was noted to have positive sputum culture for serratia marcescens and completed his course of abx. He was initially decannulated on 9/23. Recently he started having multiples of melena, emesis and went into acte respiratory distress and was ultimately re-intubated on 9/26.     He had blood cultures positive for enterobacter cloacae/serratia and sputum positive for stenotrophomonas remains on IV antibiotics. He is s/p trach with ENT on 10/4. He underwent SMA stent x 2 on 10/18. His nausea and vomiting has improved, and he is now tolerating PO diet along with tube feeds. His DNR/DNI has been rescinded. Diet has been advanced and keotube has been removed. He is currently tolerating trach collar. Current plan is for him to be discharged to rehab with trach in place and once stronger will return for decannulation. He is awaiting bed at [facility].

## 2021-11-16 NOTE — PROGRESS NOTE ADULT - PROBLEM SELECTOR PLAN 2
Suction prn- approx q6 hours    trach #6 shiley cuffless -CAP FROM BREAKFAST TO 5PM  monitor O2 sats

## 2021-11-16 NOTE — PROGRESS NOTE ADULT - SUBJECTIVE AND OBJECTIVE BOX

## 2021-11-16 NOTE — PROGRESS NOTE ADULT - ATTENDING COMMENTS
65 YO M with a history of stage D NICM s/p HM2 on 9/2017 who was admitted with abdominal pain and has had a long protracted course complicated by GI bleed, respiratory failure s/p trach, multiple infection, abdominal pain s/p cholecystostomy tube, and now s/p SMA stent  Currently patient is tolerating food and is waiting on disposition to [facility]  c/w toprol XL 50 mg QD, hydralazine 25mg PO Q8 and spironolactone 25mg PO Q24  on cipro 500mg daily for ppx 67 YO M with a history of stage D NICM s/p HM2 on 9/2017 who was admitted with abdominal pain and has had a long protracted course complicated by GI bleed, respiratory failure s/p trach, multiple infection, abdominal pain s/p cholecystostomy tube, and now s/p SMA stent  Currently patient is tolerating food   c/w toprol XL 50 mg QD, hydralazine 25mg PO Q8 and spironolactone 25mg PO Q24  on cipro 500mg daily for ppx  Pending bed at [facility]

## 2021-11-16 NOTE — PROGRESS NOTE ADULT - SUBJECTIVE AND OBJECTIVE BOX
Behavioral Cardiology Progress Note     History of present illness: Mr. Quispe is a 66 year-old man with history of NICM s/p HM2 on 9/2017 as DT (due to severe PAD) with TV ring, prior COVID-19 infection 4/2020, recurrent syncope post LVAD s/p ILR, and chronic abdominal pain with prior negative workup who was admitted with symptomatic anemia with Hgb 4.5 in setting of INR 8.8 without hemodynamic instability. Testing showed active bleeding in the small bowel but subsequent enteroscopy 6/15 did not reveal any active bleeding. He acutely decompensated after procedure with fever/hypertension, low flow alarms, and pulmonary infiltrate with hypoxia requiring intubation from probable aspiration PNA.  Course notable for inability to wean ventilator with persistent secretions for which he underwent tracheostomy as well as acute c diff colitis.     Social history: , lives in his sister’s house in [place]. Has 3 sons (2 live in , 1 in [place]). One of 3 siblings. Lives in his sister’s house in [place] (Cristina Rudolph [phone]), also in the home are niece [phone]) and nephew (Miller Rudolph).  Another sister lives close by in [place] and all other siblings live in [place] which the family visit often.  Worked full time at [employer] in [place], stopped working due to heart disease. Education: high school graduate.     Substance use:   Tobacco: Former smoker, 8-10 cigarettes/day for 30 years.   Alcohol: Past use of 3-4 drinks of Johnson 2x/week. None for past 4 years.   Drug: Denies any drug use.

## 2021-11-16 NOTE — PROGRESS NOTE ADULT - ASSESSMENT
Mental status exam: Seen sitting in chair with Trach collar. Alert and oriented x3. Speech with improved volume. Able to communicate better. Thought process goal oriented; content appropriate to conversation. Mood "happy." Affect engaged, less constricted. Denies symptoms of hopelessness, depression. Denies SI/HI. Insight and judgment adequate.      Psychological assessment: Seen sitting in chair. "Happy" about going to rehab soon. Understands he will be having physical therapy daily and was told he may be there "a month." Appetite ok.  Denies abdominal pain. Sleeping ok at night. Mood happy. Engaged throughout assessment. Smiling at times. Happy with progress he's made. Denies symptoms of anxiety and depression. Denies SI/HI. No signs of delirium. Receptive to support and validation.     Dx: Adjustment disorder with anxiety and depressed mood. F43.23 Systolic heart failure I50.2  LVAD Z95.811. Delirium, mixed hypoactive/hyperactive. F05    Recommendations:   When possible, take outside weather permitting    Provide emotional support   Behavioral Cardiology will follow      18 minutes spent on total patient encounter    Jessica Hennessy, PhD  [phone]

## 2021-11-16 NOTE — PROGRESS NOTE ADULT - SUBJECTIVE AND OBJECTIVE BOX
VITAL SIGNS-Telemetry:  SR 90  Vital Signs Last 24 Hrs  T(C): 37 (21 @ 06:05), Max: 37.2 (11-15-21 @ 14:15)  T(F): 98.6 (21 @ 06:05), Max: 99 (11-15-21 @ 14:15)  HR: 112 (21 @ 06:05) (99 - 112)  BP: --  RR: 18 (21 @ 06:05) (18 - 20)  SpO2: 100% (21 @ 06:05) (100% - 100%)         11-15 @ 07:01  -   @ 07:00  --------------------------------------------------------  IN: 1200 mL / OUT: 1860 mL / NET: -660 mL    Daily     Daily Weight in k.8 (2021 06:05)        PHYSICAL EXAM:  Neurology: alert and oriented x 3, nonfocal, no gross deficits  CV : _vad hum  Lungs: cta  Abdomen: soft, nontender, nondistended, positive bowel sounds, last bowel movement         Extremities:     no c/c/e    aspirin  chewable 81 milliGRAM(s) Oral daily  chlorhexidine 2% Cloths 1 Application(s) Topical <User Schedule>  ciprofloxacin     Tablet 500 milliGRAM(s) Oral every 12 hours  hydrALAZINE 25 milliGRAM(s) Oral every 8 hours  magnesium oxide 400 milliGRAM(s) Oral every 12 hours  methimazole 10 milliGRAM(s) Oral daily  metoclopramide Injectable 10 milliGRAM(s) IV Push every 8 hours  metoprolol succinate ER 50 milliGRAM(s) Oral daily  mirtazapine 7.5 milliGRAM(s) Oral at bedtime  ondansetron Injectable 4 milliGRAM(s) IV Push every 8 hours PRN  pantoprazole    Tablet 40 milliGRAM(s) Oral two times a day  sertraline 100 milliGRAM(s) Oral daily  spironolactone 25 milliGRAM(s) Oral daily  vancomycin    Solution 125 milliGRAM(s) Oral every 12 hours    Physical Therapy Rec:   Home  [  ]   Home w/ PT  [  ] [facility]  [ x ]  Discussed with Cardiothoracic Team at AM rounds.

## 2021-11-16 NOTE — PROGRESS NOTE ADULT - PROBLEM/PLAN-2
DISPLAY PLAN FREE TEXT

## 2021-11-16 NOTE — PROGRESS NOTE ADULT - PROBLEM SELECTOR PLAN 1
HF follow up   added ald 25 qd  mAINTAIN CURRENT SPEED.  asa 81, toprol xl 50 qd  rt upper extremity  midline for access   hydral 25 q8 as per CHF/LVAD team   rehab when bed available & insurance auth obtained

## 2021-11-16 NOTE — PROGRESS NOTE ADULT - PROBLEM/PLAN-3
DISPLAY PLAN FREE TEXT

## 2021-11-16 NOTE — PROGRESS NOTE ADULT - PROBLEM/PLAN-7
DISPLAY PLAN FREE TEXT

## 2021-11-16 NOTE — PROGRESS NOTE ADULT - PROBLEM/PLAN-4
DISPLAY PLAN FREE TEXT

## 2021-11-16 NOTE — PROGRESS NOTE ADULT - TIME BILLING
titration of steroids, MMI AE
hyperthyroidism, AE of MMI
glycemic control, hyperthyroidism, AE of MMI
- Review of notes/records, telemetry, vital signs and daily labs.   - General and cardiovascular physical examination.  - Generation of cardiovascular treatment plan.  - Coordination of care with primary team.
MMI, hyperthyroidism, steroid taper
hyperthyroidism, AE of MMI, steroid taper
thyrotoxicosis, steroid-induced hyperglycemia
hyperthyroidism, steroid-induced hyperglycemia
glycemic control, steroid taper, hyperthyroidism
hyperthyroidism, steroid taper
thyrotoxicosis, steroid-induced hyperglycemia
hyperthyroidism, steroid taper
hyperthyroidism, side effects of MMI, steroid taper
hyperthyroidism, steroid taper
thyrotoxicosis, steroid taper
Helio
care coordination, review of chart
- Review of notes/records, telemetry, vital signs and daily labs.   - General and cardiovascular physical examination.  - Generation of cardiovascular treatment plan.  - Coordination of care with primary team.
care coordination, review of chart
- Review of notes/records, telemetry, vital signs and daily labs.   - General and cardiovascular physical examination.  - Generation of cardiovascular treatment plan.  - Coordination of care with primary team.
coordination of care, review of chart, MDT rounds
care coordination, review of chart
- Review of notes/records, telemetry, vital signs and daily labs.   - General and cardiovascular physical examination.  - Generation of cardiovascular treatment plan.  - Coordination of care with primary team.
patient education, coordination of care, chart review
- Review of notes/records, telemetry, vital signs and daily labs.   - General and cardiovascular physical examination.  - Generation of cardiovascular treatment plan.  - Coordination of care with primary team.
- Review of notes/records, telemetry, vital signs and daily labs.   - General and cardiovascular physical examination.  - Generation of cardiovascular treatment plan.  - Coordination of care with primary team.
- Review of records, telemetry, vital signs and daily labs.   - General and cardiovascular physical examination.  - Generation of cardiovascular treatment plan.  - Coordination of care with primary team.
- Review of notes/records, telemetry, vital signs and daily labs.   - General and cardiovascular physical examination.  - Generation of cardiovascular treatment plan.  - Coordination of care with primary team.
coordination of care, review of chart, MDT rounds
- Review of notes/records, telemetry, vital signs and daily labs.   - General and cardiovascular physical examination.  - Generation of cardiovascular treatment plan.  - Coordination of care with primary team.

## 2021-11-16 NOTE — PROGRESS NOTE ADULT - TIME-BASED BILLING (NON-CRITICAL CARE)
Time-based billing (NON-critical care)

## 2021-11-17 NOTE — PHYSICAL THERAPY INITIAL EVALUATION ADULT - PLANNED THERAPY INTERVENTIONS, PT EVAL
Symptoms
transfer training/bed mobility training/gait training/stairs goal: pt will negotiate one flight of stairs indep within 2 weeks

## 2021-11-17 NOTE — PROGRESS NOTE ADULT - SUBJECTIVE AND OBJECTIVE BOX
RICKY JOINT  MRN#: [id number]  Subjective:  Pulmonary progress  : recurrent Acute hypoxic respiratory Failure ,aspiration pneumonia, NICM  ,   65M PMH ACC/AHA stage D HF due to NICM HM2 LVAD , TV annuloplasty ring 17 as destination therapy due to severe peripheral artery disease with significant stenosis  SIADH, Depression, CKD-3 with hyperkalemia, past E. coli UTIs, driveline drainage (21) and COVID-19 (back in 2020)  He was recently seen in clinic where he complained of abdominal pain and dark stools w constipation back in May. He presents to [facility] ER today weakness and fatigue, moderate and + Black stools for three days, on coumadin secondary to warfarin use in the setting of an LVAD. Patient has required transfusions for GIB in the past. Mostly recently back in 2021 pt had anemia with dark stools. No interventions was done at that time. However Last Endoscopy was done in 2020 (negative). Today labs show patient is anemic with H/H of 4.5/16.3,. INR is 8.84 MAP in the 90s, Temp 35.1. He denies any chest pain, shortness of breath, dizziness, abd pain, nausea or vomiting. found to have  rectal bleeding underwent endoscopy ,old blood in the proximal ileum ,  develop sepsis with LL opacity given Antibiotics , Extubated , reintubated , Bronchoscopy on Zosyn for LL pneumonia  and Amiodarone S/P TV Annuloplasty , patient remain intubated on full ventilatory support .S/P multiple units of blood transfusion , remain on full ventilatory support on Precedex and propofol , new central IJ line , diarrhea C diff. +ve on po vancomycin and IV Flagyl,  mildly distended belly , fever start on cefepime 2gm q 8 hrs S/P tracheostomy .  new RT Subclavian central line continue on contact  isolation ,S/P  C-diff antibiotics, no more diarrhea back on full support mechanical ventilator , chest x ray show improvement in LLL air space disease, more awake and responsive on tube feeding no more diarrhea ,  no nausea or vomiting or diarrhea still very weak and tired , back on tube feeding ,still on po vancomycin , getting PT and OT at bed side ,   , no SOB getting stronger , improve muscle tone patient transfer to monitor bed still on contact isolation for C-Difficel colitis on 50% FI02, and change to Suresh  tube feeding still loose stool . H/H drop significantly require blood transfusion , most likely GI bleeding , IV heparin D/C ,  H/H is stable ., patient develop TR sided  pneumothorax require chest tube placement , RT IJ central line  placed , develop fever shaking chills , blood culture positive for serratia marcescens , start on cefepime .the patient  become hypoxic and hypotensive placed on full ventilatory support and Vasopressin , levophed and Dobutamine ,S/P blood transfusion on meropenem and vancomycin ,    .S/P IR cholecystostomy tube drainage placement in the RT upper Quadrant , resume anticoagulation chest x ray noted   on methimazole for hyperthyroidism ,  condition is the same ,still C/O Abdominal pain , white count is improving , no chest pain or SOB ,  .placed on Reglan 10 mg TID for gastric motility , depressed , withdrawn. , S/P  mesenteric angiogram , unable to stent SMA S/P 3 units of blood transfusion   RT IJ in place reassessed for stent in the SMA by vascular,  dark stool stable H/H ,surgical opinion for possible Lap cholecystectomy. over night events noted develop respiratory distress, , rectal bleeding, require intubation placed on full ventilatory support , FI02 is 50% also became hemianosmically unstable require triple pressor support with levophed , vasopressin and norsynephrine, pan culture placed  on Vancomycin IV and PO  and Zosyn, sedated with propofol kept NPO , new central line RT and left IJ cathter placed .    , S/P  tracheostomy , ID and heart failure follow up appreciated , depresses no weaning for now , patient S/P  vascular procedure for superior mesenteric artery occlusion S/P 2 stent placement , patient tolerate it very well  ,  increase WBC , new RT UPE midline ,    WBC is improving   , S/P FEEST study result is pending , PO feeding with observation . on Cipro antibiotics  no major over night event  on monitor bed .start decannulation to cuffless trach . on 40% FI02, po feeding   , good saturation , on pure die D/C NG tube feeding  no plan for decannulation still significant secretion , better and more receptive for PO feeding and medication  still Flat Affect , po intake is better minimal secretion tolerating capping of the trach. off 02 saturation is 98% , no evidence of aspiration , on speaker Passy mural  valve minimal secretion .still very weak   on oral po feeding no evidence of aspiration , getting stronger      (2021 16:57)    PAST MEDICAL & SURGICAL HISTORY:  CHF (congestive heart failure)    CAD (coronary artery disease)  Depression    Pleural effusion    History of 2019 novel coronavirus disease (COVID-19)  2020    Hemorrhoids    Bleeding hemorrhoids    Peripheral arterial disease    Claudication    BPH with urinary obstruction    ACC/AHA stage D systolic heart failure  Anticoagulation goal of INR 2.0 to 2.5    Falls    Clavicle fracture    CKD (chronic kidney disease), stage III    Iron deficiency anemia    H/O epistaxis    Vertigo    GI bleed    S/P TVR (tricuspid valve repair)    S/P ventricular assist device    S/P endoscopy    OBJECTIVE:  ICU Vital Signs Last 24 Hrs  T(C): 38.2 (2021 10:00), Max: 38.5 (2021 12:00)  T(F): 100.8 (2021 10:00), Max: 101.3 (2021 12:00)  HR: 65 (2021 10:00) (61 - 69)  BP: --  BP(mean): --  ABP: 105/67 (2021 10:00) (90/54 - 113/64)  ABP(mean): 77 (2021 10:00) (63 - 77)  RR: 20 (2021 10:00) (19 - 35)  SpO2: 99% (2021 10:00) (96% - 100%)       @ 07: @ 07:00  --------------------------------------------------------  IN: 2693.9 mL / OUT: 1415 mL / NET: 1278.9 mL     @ 07:  -   @ 10:49  --------------------------------------------------------  IN: 420.8 mL / OUT: 115 mL / NET: 305.8 mL  PHYSICAL EXAM: Daily   Elderly male  on trachBig In [place] collar  [facility] is 40% S/P tracheostomy with speaker valve   Daily Weight in k.4 (2021 00:00)  HEENT:     + NCAT  + EOMI  - Conjuctival edema   - Icterus   - Thrush   - ETT  - NGT/OGT  Neck:         + FROM  + JVD  - Nodes - Masses + Mid-line trachea + Tracheostomy  Chest:            normal A-P diameter    Lungs:          + CTA   + Rhonchi    - Rales    - Wheezing + Decreased  LT BS   - Dullness R L  Cardiac:       + S1 + S2    + RRR   - Irregular   - S3  - S4    - Murmurs   - Rub   - Hamman’s sign   Abdomen:    + BS  + Soft + Non-tender - Distended - Organomegaly - PEG .cholecystostomy tube in place  Extremities:   - Cyanosis U/L   - Clubbing  U/L  + LE/UE Edema LUE hematoma   + Capillary refill    + Pulses   Neuro:        - Awake   -  Alert   - Confused   - Lethargic   + Sedated  + Generalized Weakness  Skin:        - Rashes    - Erythema   + Normal incisions   + IV sites intact          HOSPITAL MEDICATIONS: All medications reviewed and analyzed  MEDICATIONS  (STANDING):  amiodarone    Tablet 200 milliGRAM(s) Oral daily  chlorhexidine 0.12% Liquid 15 milliLiter(s) Oral Mucosa every 12 hours  chlorhexidine 2% Cloths 1 Application(s) Topical <User Schedule>  dexmedetomidine Infusion 0.5 MICROgram(s)/kG/Hr (9.81 mL/Hr) IV Continuous <Continuous>  dextrose 50% Injectable 50 milliLiter(s) IV Push every 15 minutes  heparin  Infusion 400 Unit(s)/Hr (12.5 mL/Hr) IV Continuous <Continuous>  Hydromorphone  Injectable 0.5 milliGRAM(s) IV Push once  insulin lispro (ADMELOG) corrective regimen sliding scale   SubCutaneous every 6 hours  pantoprazole  Injectable 40 milliGRAM(s) IV Push every 12 hours  piperacillin/tazobactam IVPB.. 3.375 Gram(s) IV Intermittent every 8 hours  propofol Infusion 20 MICROgram(s)/kG/Min (9.42 mL/Hr) IV Continuous <Continuous>  sodium chloride 0.9% lock flush 3 milliLiter(s) IV Push every 8 hours  sodium chloride 0.9%. 1000 milliLiter(s) (10 mL/Hr) IV Continuous <Continuous>    MEDICATIONS  (PRN):  acetaminophen    Suspension .. 650 milliGRAM(s) Oral every 6 hours PRN Temp greater or equal to 38C (100.4F)    LABS: All Lab data reviewed and analyzed                                    9.9    5.90  )-----------( 222      ( 15 Nov 2021 05:49 )             31.2   11-15    131<L>  |  96  |  19  ----------------------------<  84  4.2   |  24  |  0.62    Ca    9.8      15 Nov 2021 05:50      Ca    9.8      15 Nov 2021 05:50  Phos  3.5     11-14  Mg     1.8     -14        Ca    9.6      2021 06:24  Phos  3.5     11-14  Mg     1.8     -14    TPro  7.8  /  Alb  3.7  /  TBili  0.4  /  DBili  x   /  AST  24  /  ALT  33  /  AlkPhos  167<H>      Ca    9.5      2021 06:55  Mg     1.9     11-12    TPro  7.8  /  Alb  3.7  /  TBili  0.4  /  DBili  x   /  AST  24  /  ALT  33  /  AlkPhos  167<H>                                                                                                                                                                                                PTT - ( 2021 04:52 )  PTT:45.2 sec LIVER FUNCTIONS - ( 2021 00:42 )  Alb: 3.4 g/dL / Pro: 6.7 g/dL / ALK PHOS: 213 U/L / ALT: 15 U/L / AST: 24 U/L / GGT: x           RADIOLOGY: - Reviewed and analyzed  , LVAD HM2, CT scan of abdomen reviewed result noted

## 2021-11-17 NOTE — PROGRESS NOTE ADULT - NUTRITIONAL ASSESSMENT
This patient has been assessed with a concern for Malnutrition and has been determined to have a diagnosis/diagnoses of Moderate protein-calorie malnutrition.    This patient is being managed with:   Diet NPO with Tube Feed-  Tube Feeding Modality: Nasogastric  Jevity 1.2 Tank (JEVITY1.2RTH)  Total Volume for 24 Hours (mL): 1440  Continuous  Starting Tube Feed Rate {mL per Hour}: 10  Increase Tube Feed Rate by (mL): 10     Every 6 hours  Until Goal Tube Feed Rate (mL per Hour): 60  Tube Feed Duration (in Hours): 24  Tube Feed Start Time: 09:30  Supplement Feeding Modality:  Nasogastric  Probiotic Yogurt/Smoothie Cans or Servings Per Day:  2       Frequency:  Daily  Entered: Jul 13 2021  9:18AM    
This patient has been assessed with a concern for Malnutrition and has been determined to have a diagnosis/diagnoses of Moderate protein-calorie malnutrition.    This patient is being managed with:   Diet NPO with Tube Feed-  Tube Feeding Modality: Nasogastric  Jevity 1.2 Tank (JEVITY1.2RTH)  Total Volume for 24 Hours (mL): 1440  Continuous  Starting Tube Feed Rate {mL per Hour}: 10  Increase Tube Feed Rate by (mL): 10     Every 6 hours  Until Goal Tube Feed Rate (mL per Hour): 60  Tube Feed Duration (in Hours): 24  Tube Feed Start Time: 09:30  Supplement Feeding Modality:  Nasogastric  Probiotic Yogurt/Smoothie Cans or Servings Per Day:  2       Frequency:  Daily  Entered: Jul 13 2021  9:18AM    
This patient has been assessed with a concern for Malnutrition and has been determined to have a diagnosis/diagnoses of Moderate protein-calorie malnutrition.    This patient is being managed with:   Diet NPO with Tube Feed-  Tube Feeding Modality: Nasogastric  Vital AF (VITALAFRTH)  Total Volume for 24 Hours (mL): 1440  Continuous  Starting Tube Feed Rate {mL per Hour}: 10  Increase Tube Feed Rate by (mL): 10     Every 6 hours  Until Goal Tube Feed Rate (mL per Hour): 60  Tube Feed Duration (in Hours): 24  Tube Feed Start Time: 08:30  Entered: Jun 24 2021 10:08AM    
This patient has been assessed with a concern for Malnutrition and has been determined to have a diagnosis/diagnoses of Moderate protein-calorie malnutrition.    This patient is being managed with:   Diet NPO with Tube Feed-  Tube Feeding Modality: Nasogastric  Vital AF (VITALAFRTH)  Total Volume for 24 Hours (mL): 1440  Continuous  Starting Tube Feed Rate {mL per Hour}: 10  Increase Tube Feed Rate by (mL): 10     Every 6 hours  Until Goal Tube Feed Rate (mL per Hour): 60  Tube Feed Duration (in Hours): 24  Tube Feed Start Time: 08:30  Supplement Feeding Modality:  Nasogastric  Probiotic Yogurt/Smoothie Cans or Servings Per Day:  2       Frequency:  Daily  Entered: Jul 9 2021 11:49AM    
This patient has been assessed with a concern for Malnutrition and has been determined to have a diagnosis/diagnoses of Severe protein-calorie malnutrition.    This patient is being managed with:   Diet NPO with Tube Feed-  Tube Feeding Modality: Nasogastric  Jevity 1.2 Tank (JEVITY1.2RTH)  Total Volume for 24 Hours (mL): 1440  Continuous  Starting Tube Feed Rate {mL per Hour}: 10  Increase Tube Feed Rate by (mL): 10     Every 6 hours  Until Goal Tube Feed Rate (mL per Hour): 60  Tube Feed Duration (in Hours): 24  Tube Feed Start Time: 11:00  No Carb Prosource TF     Qty per Day:  2  Supplement Feeding Modality:  Nasogastric  Probiotic Yogurt/Smoothie Cans or Servings Per Day:  2       Frequency:  Daily  Entered: Jul 30 2021  3:25PM    
This patient has been assessed with a concern for Malnutrition and has been determined to have a diagnosis/diagnoses of Severe protein-calorie malnutrition.    This patient is being managed with:   Diet NPO with Tube Feed-  Tube Feeding Modality: Nasogastric  Jevity 1.2 Tank (JEVITY1.2RTH)  Total Volume for 24 Hours (mL): 1440  Continuous  Starting Tube Feed Rate {mL per Hour}: 30  Increase Tube Feed Rate by (mL): 10     Every 4 hours  Until Goal Tube Feed Rate (mL per Hour): 60  Tube Feed Duration (in Hours): 24  Tube Feed Start Time: 16:00  No Carb Prosource TF     Qty per Day:  2  Supplement Feeding Modality:  Nasogastric  Probiotic Yogurt/Smoothie Cans or Servings Per Day:  2       Frequency:  Daily  Entered: Aug  6 2021  4:10PM    
This patient has been assessed with a concern for Malnutrition and has been determined to have a diagnosis/diagnoses of Severe protein-calorie malnutrition.    This patient is being managed with:   Diet NPO with Tube Feed-  Tube Feeding Modality: Nasogastric  TwoCal HN (TWOCALHNRTH)  Total Volume for 24 Hours (mL): 720  Continuous  Starting Tube Feed Rate {mL per Hour}: 10  Increase Tube Feed Rate by (mL): 10     Every 4 hours  Until Goal Tube Feed Rate (mL per Hour): 30  Tube Feed Duration (in Hours): 24  Tube Feed Start Time: 13:25  No Carb Prosource TF     Qty per Day:  2  Entered: Aug 18 2021  9:57AM    
This patient has been assessed with a concern for Malnutrition and has been determined to have a diagnosis/diagnoses of Severe protein-calorie malnutrition.    This patient is being managed with:   Diet NPO with Tube Feed-  Tube Feeding Modality: Nasogastric  Vital 1.5 Tank (VITAL1.5RTH)  Total Volume for 24 Hours (mL): 1560  Continuous  Starting Tube Feed Rate {mL per Hour}: 10  Increase Tube Feed Rate by (mL): 5     Every 24 hours  Until Goal Tube Feed Rate (mL per Hour): 65  Tube Feed Duration (in Hours): 24  Tube Feed Start Time: 11:45  Supplement Feeding Modality:  Nasogastric  Probiotic Yogurt/Smoothie Cans or Servings Per Day:  2       Frequency:  Daily  Entered: Aug 29 2021 12:08AM    
This patient has been assessed with a concern for Malnutrition and has been determined to have a diagnosis/diagnoses of Severe protein-calorie malnutrition.    This patient is being managed with:   Diet NPO with Tube Feed-  Tube Feeding Modality: Nasogastric  Vital 1.5 Tank (VITAL1.5RTH)  Total Volume for 24 Hours (mL): 1560  Continuous  Starting Tube Feed Rate {mL per Hour}: 10  Increase Tube Feed Rate by (mL): 5     Every 24 hours  Until Goal Tube Feed Rate (mL per Hour): 65  Tube Feed Duration (in Hours): 24  Tube Feed Start Time: 11:45  Supplement Feeding Modality:  Nasogastric  Probiotic Yogurt/Smoothie Cans or Servings Per Day:  2       Frequency:  Daily  Entered: Sep 10 2021  9:16PM    
This patient has been assessed with a concern for Malnutrition and has been determined to have a diagnosis/diagnoses of Severe protein-calorie malnutrition.    This patient is being managed with:   Diet NPO with Tube Feed-  Tube Feeding Modality: Nasogastric  Vital 1.5 Tank (VITAL1.5RTH)  Total Volume for 24 Hours (mL): 960  Continuous  Starting Tube Feed Rate {mL per Hour}: 20  Increase Tube Feed Rate by (mL): 5     Every 6 hours  Until Goal Tube Feed Rate (mL per Hour): 40  Tube Feed Duration (in Hours): 24  Tube Feed Start Time: 10:00  Entered: Sep 29 2021 10:11AM    
This patient has been assessed with a concern for Malnutrition and has been determined to have a diagnosis/diagnoses of Moderate protein-calorie malnutrition.    This patient is being managed with:   Diet NPO after Midnight-     NPO Start Date: 25-Jul-2021   NPO Start Time: 23:59  Entered: Jul 25 2021 10:58AM    Diet NPO with Tube Feed-  Tube Feeding Modality: Nasogastric  Jevity 1.2 Tank (JEVITY1.2RTH)  Total Volume for 24 Hours (mL): 1440  Continuous  Starting Tube Feed Rate {mL per Hour}: 10  Increase Tube Feed Rate by (mL): 10     Every 6 hours  Until Goal Tube Feed Rate (mL per Hour): 60  Tube Feed Duration (in Hours): 24  Tube Feed Start Time: 09:30  Supplement Feeding Modality:  Nasogastric  Probiotic Yogurt/Smoothie Cans or Servings Per Day:  2       Frequency:  Daily  Entered: Jul 13 2021  9:18AM    
This patient has been assessed with a concern for Malnutrition and has been determined to have a diagnosis/diagnoses of Moderate protein-calorie malnutrition.    This patient is being managed with:   Diet NPO with Tube Feed-  Tube Feeding Modality: Nasogastric  Jevity 1.2 Tank (JEVITY1.2RTH)  Total Volume for 24 Hours (mL): 1440  Continuous  Starting Tube Feed Rate {mL per Hour}: 10  Increase Tube Feed Rate by (mL): 10     Every 6 hours  Until Goal Tube Feed Rate (mL per Hour): 60  Tube Feed Duration (in Hours): 24  Tube Feed Start Time: 09:30  Supplement Feeding Modality:  Nasogastric  Probiotic Yogurt/Smoothie Cans or Servings Per Day:  2       Frequency:  Daily  Entered: Jul 13 2021  9:18AM    
This patient has been assessed with a concern for Malnutrition and has been determined to have a diagnosis/diagnoses of Moderate protein-calorie malnutrition.    This patient is being managed with:   Diet NPO with Tube Feed-  Tube Feeding Modality: Nasogastric  Vital AF (VITALAFRTH)  Total Volume for 24 Hours (mL): 1440  Continuous  Starting Tube Feed Rate {mL per Hour}: 10  Increase Tube Feed Rate by (mL): 10     Every 6 hours  Until Goal Tube Feed Rate (mL per Hour): 60  Tube Feed Duration (in Hours): 24  Tube Feed Start Time: 08:30  Banatrol TF     Qty per Day:  2  Supplement Feeding Modality:  Nasogastric  Probiotic Yogurt/Smoothie Cans or Servings Per Day:  2       Frequency:  Daily  Entered: Jun 29 2021  9:44AM    
This patient has been assessed with a concern for Malnutrition and has been determined to have a diagnosis/diagnoses of Moderate protein-calorie malnutrition.    This patient is being managed with:   Diet NPO with Tube Feed-  Tube Feeding Modality: Nasogastric  Vital AF (VITALAFRTH)  Total Volume for 24 Hours (mL): 1440  Continuous  Starting Tube Feed Rate {mL per Hour}: 10  Increase Tube Feed Rate by (mL): 10     Every 6 hours  Until Goal Tube Feed Rate (mL per Hour): 60  Tube Feed Duration (in Hours): 24  Tube Feed Start Time: 08:30  Entered: Jun 24 2021 10:08AM    
This patient has been assessed with a concern for Malnutrition and has been determined to have a diagnosis/diagnoses of Moderate protein-calorie malnutrition.    This patient is being managed with:   Diet NPO with Tube Feed-  Tube Feeding Modality: Nasogastric  Vital AF (VITALAFRTH)  Total Volume for 24 Hours (mL): 1440  Continuous  Starting Tube Feed Rate {mL per Hour}: 10  Increase Tube Feed Rate by (mL): 10     Every 6 hours  Until Goal Tube Feed Rate (mL per Hour): 60  Tube Feed Duration (in Hours): 24  Tube Feed Start Time: 08:30  Entered: Jun 24 2021 10:08AM    
This patient has been assessed with a concern for Malnutrition and has been determined to have a diagnosis/diagnoses of Moderate protein-calorie malnutrition.    This patient is being managed with:   Diet NPO with Tube Feed-  Tube Feeding Modality: Nasogastric  Vital AF (VITALAFRTH)  Total Volume for 24 Hours (mL): 1440  Continuous  Starting Tube Feed Rate {mL per Hour}: 10  Increase Tube Feed Rate by (mL): 10     Every 6 hours  Until Goal Tube Feed Rate (mL per Hour): 60  Tube Feed Duration (in Hours): 24  Tube Feed Start Time: 08:30  Supplement Feeding Modality:  Nasogastric  Probiotic Yogurt/Smoothie Cans or Servings Per Day:  2       Frequency:  Daily  Entered: Jul 9 2021 11:49AM    
This patient has been assessed with a concern for Malnutrition and has been determined to have a diagnosis/diagnoses of Severe protein-calorie malnutrition.    This patient is being managed with:   Diet Easy to Chew-  Supplement Feeding Modality:  Oral  Ensure Enlive Cans or Servings Per Day:  2       Frequency:  Daily  Entered: Nov 12 2021  2:28PM    
This patient has been assessed with a concern for Malnutrition and has been determined to have a diagnosis/diagnoses of Severe protein-calorie malnutrition.    This patient is being managed with:   Diet NPO after Midnight-     NPO Start Date: 09-Sep-2021   NPO Start Time: 23:59  Entered: Sep  9 2021  5:18PM    Diet NPO with Tube Feed-  Tube Feeding Modality: Nasogastric  Vital 1.5 Tank (VITAL1.5RTH)  Total Volume for 24 Hours (mL): 1560  Continuous  Starting Tube Feed Rate {mL per Hour}: 10  Increase Tube Feed Rate by (mL): 5     Every 24 hours  Until Goal Tube Feed Rate (mL per Hour): 65  Tube Feed Duration (in Hours): 24  Tube Feed Start Time: 11:45  Supplement Feeding Modality:  Nasogastric  Probiotic Yogurt/Smoothie Cans or Servings Per Day:  2       Frequency:  Daily  Entered: Aug 29 2021 12:08AM    
This patient has been assessed with a concern for Malnutrition and has been determined to have a diagnosis/diagnoses of Severe protein-calorie malnutrition.    This patient is being managed with:   Diet NPO with Tube Feed-  Tube Feeding Modality: Nasogastric  Jevity 1.2 Tank (JEVITY1.2RTH)  Total Volume for 24 Hours (mL): 1440  Continuous  Starting Tube Feed Rate {mL per Hour}: 30  Increase Tube Feed Rate by (mL): 10     Every 4 hours  Until Goal Tube Feed Rate (mL per Hour): 60  Tube Feed Duration (in Hours): 24  Tube Feed Start Time: 16:00  No Carb Prosource TF     Qty per Day:  2  Supplement Feeding Modality:  Nasogastric  Probiotic Yogurt/Smoothie Cans or Servings Per Day:  2       Frequency:  Daily  Entered: Aug  6 2021  4:10PM    
This patient has been assessed with a concern for Malnutrition and has been determined to have a diagnosis/diagnoses of Severe protein-calorie malnutrition.    This patient is being managed with:   Diet NPO with Tube Feed-  Tube Feeding Modality: Nasogastric  TwoCal HN (TWOCALHNRTH)  Total Volume for 24 Hours (mL): 1080  Continuous  Starting Tube Feed Rate {mL per Hour}: 10  Increase Tube Feed Rate by (mL): 10     Every 6 hours  Until Goal Tube Feed Rate (mL per Hour): 45  Tube Feed Duration (in Hours): 24  Tube Feed Start Time: 13:25  Entered: Aug 18 2021  1:37PM    
This patient has been assessed with a concern for Malnutrition and has been determined to have a diagnosis/diagnoses of Severe protein-calorie malnutrition.    This patient is being managed with:   Diet NPO with Tube Feed-  Tube Feeding Modality: Nasogastric  TwoCal HN (TWOCALHNRTH)  Total Volume for 24 Hours (mL): 240  Continuous  Starting Tube Feed Rate {mL per Hour}: 10  Until Goal Tube Feed Rate (mL per Hour): 10  Tube Feed Duration (in Hours): 24  Tube Feed Start Time: 13:25  Entered: Aug 15 2021  1:24PM    
This patient has been assessed with a concern for Malnutrition and has been determined to have a diagnosis/diagnoses of Severe protein-calorie malnutrition.    This patient is being managed with:   Diet NPO with Tube Feed-  Tube Feeding Modality: Nasogastric  TwoCal HN (TWOCALHNRTH)  Total Volume for 24 Hours (mL): 240  Continuous  Starting Tube Feed Rate {mL per Hour}: 10  Until Goal Tube Feed Rate (mL per Hour): 10  Tube Feed Duration (in Hours): 24  Tube Feed Start Time: 13:25  Entered: Aug 15 2021  1:24PM    
This patient has been assessed with a concern for Malnutrition and has been determined to have a diagnosis/diagnoses of Severe protein-calorie malnutrition.    This patient is being managed with:   Diet NPO with Tube Feed-  Tube Feeding Modality: Nasogastric  Vital 1.5 Tank (VITAL1.5RTH)  Total Volume for 24 Hours (mL): 1560  Continuous  Starting Tube Feed Rate {mL per Hour}: 10  Increase Tube Feed Rate by (mL): 10     Every 6 hours  Until Goal Tube Feed Rate (mL per Hour): 65  Tube Feed Duration (in Hours): 24  Tube Feed Start Time: 11:45  Supplement Feeding Modality:  Nasogastric  Probiotic Yogurt/Smoothie Cans or Servings Per Day:  2       Frequency:  Daily  Entered: Aug 26 2021 11:47AM    
This patient has been assessed with a concern for Malnutrition and has been determined to have a diagnosis/diagnoses of Severe protein-calorie malnutrition.    This patient is being managed with:   Diet NPO with Tube Feed-  Tube Feeding Modality: Nasogastric  Vital 1.5 Tank (VITAL1.5RTH)  Total Volume for 24 Hours (mL): 1560  Continuous  Starting Tube Feed Rate {mL per Hour}: 10  Increase Tube Feed Rate by (mL): 5     Every 24 hours  Until Goal Tube Feed Rate (mL per Hour): 65  Tube Feed Duration (in Hours): 24  Tube Feed Start Time: 11:45  Supplement Feeding Modality:  Nasogastric  Probiotic Yogurt/Smoothie Cans or Servings Per Day:  2       Frequency:  Daily  Entered: Aug 29 2021 12:08AM    
This patient has been assessed with a concern for Malnutrition and has been determined to have a diagnosis/diagnoses of Severe protein-calorie malnutrition.    This patient is being managed with:   Diet NPO with Tube Feed-  Tube Feeding Modality: Nasogastric  Vital 1.5 Tank (VITAL1.5RTH)  Total Volume for 24 Hours (mL): 1560  Continuous  Starting Tube Feed Rate {mL per Hour}: 10  Increase Tube Feed Rate by (mL): 5     Every 24 hours  Until Goal Tube Feed Rate (mL per Hour): 65  Tube Feed Duration (in Hours): 24  Tube Feed Start Time: 11:45  Supplement Feeding Modality:  Nasogastric  Probiotic Yogurt/Smoothie Cans or Servings Per Day:  2       Frequency:  Daily  Entered: Sep 10 2021  9:16PM    
This patient has been assessed with a concern for Malnutrition and has been determined to have a diagnosis/diagnoses of Severe protein-calorie malnutrition.    This patient is being managed with:   Diet Pureed-  Mildly Thick Liquids (MILDTHICKLIQS)  Tube Feeding Modality: Nasogastric  Vital 1.5 Tank (VITAL1.5RTH)  Total Volume for 24 Hours (mL): 1200  Continuous  Starting Tube Feed Rate {mL per Hour}: 50  Until Goal Tube Feed Rate (mL per Hour): 50  Tube Feed Duration (in Hours): 24  Tube Feed Start Time: 15:40  Supplement Feeding Modality:  Oral  Ensure Enlive Cans or Servings Per Day:  3       Frequency:  Daily  Entered: Nov 4 2021  9:29AM    
This patient has been assessed with a concern for Malnutrition and has been determined to have a diagnosis/diagnoses of Severe protein-calorie malnutrition.    This patient is being managed with:   Diet Pureed-  Mildly Thick Liquids (MILDTHICKLIQS)  Tube Feeding Modality: Nasogastric  Vital 1.5 Tank (VITAL1.5RTH)  Total Volume for 24 Hours (mL): 1200  Continuous  Starting Tube Feed Rate {mL per Hour}: 50  Until Goal Tube Feed Rate (mL per Hour): 50  Tube Feed Duration (in Hours): 24  Tube Feed Start Time: 15:40  Supplement Feeding Modality:  Oral  Ensure Enlive Cans or Servings Per Day:  3       Frequency:  Daily  Entered: Nov 4 2021  9:29AM    
This patient has been assessed with a concern for Malnutrition and has been determined to have a diagnosis/diagnoses of Moderate protein-calorie malnutrition.    This patient is being managed with:   Diet NPO with Tube Feed-  Tube Feeding Modality: Nasogastric  Jevity 1.2 Tank (JEVITY1.2RTH)  Total Volume for 24 Hours (mL): 1440  Continuous  Starting Tube Feed Rate {mL per Hour}: 10  Increase Tube Feed Rate by (mL): 10     Every 6 hours  Until Goal Tube Feed Rate (mL per Hour): 60  Tube Feed Duration (in Hours): 24  Tube Feed Start Time: 09:30  Supplement Feeding Modality:  Nasogastric  Probiotic Yogurt/Smoothie Cans or Servings Per Day:  2       Frequency:  Daily  Entered: Jul 13 2021  9:18AM    
This patient has been assessed with a concern for Malnutrition and has been determined to have a diagnosis/diagnoses of Moderate protein-calorie malnutrition.    This patient is being managed with:   Diet NPO with Tube Feed-  Tube Feeding Modality: Nasogastric  Jevity 1.2 Tank (JEVITY1.2RTH)  Total Volume for 24 Hours (mL): 1440  Continuous  Starting Tube Feed Rate {mL per Hour}: 10  Increase Tube Feed Rate by (mL): 10     Every 6 hours  Until Goal Tube Feed Rate (mL per Hour): 60  Tube Feed Duration (in Hours): 24  Tube Feed Start Time: 09:30  Supplement Feeding Modality:  Nasogastric  Probiotic Yogurt/Smoothie Cans or Servings Per Day:  2       Frequency:  Daily  Entered: Jul 13 2021  9:18AM    
This patient has been assessed with a concern for Malnutrition and has been determined to have a diagnosis/diagnoses of Moderate protein-calorie malnutrition.    This patient is being managed with:   Diet NPO with Tube Feed-  Tube Feeding Modality: Nasogastric  Vital AF (VITALAFRTH)  Total Volume for 24 Hours (mL): 1080  Continuous  Starting Tube Feed Rate {mL per Hour}: 10  Increase Tube Feed Rate by (mL): 10     Every 6 hours  Until Goal Tube Feed Rate (mL per Hour): 45  Tube Feed Duration (in Hours): 24  Tube Feed Start Time: 08:30  No Carb Prosource TF     Qty per Day:  2  Entered: Jun 23 2021  3:43AM    
This patient has been assessed with a concern for Malnutrition and has been determined to have a diagnosis/diagnoses of Moderate protein-calorie malnutrition.    This patient is being managed with:   Diet NPO with Tube Feed-  Tube Feeding Modality: Nasogastric  Vital AF (VITALAFRTH)  Total Volume for 24 Hours (mL): 1440  Continuous  Starting Tube Feed Rate {mL per Hour}: 10  Increase Tube Feed Rate by (mL): 10     Every 6 hours  Until Goal Tube Feed Rate (mL per Hour): 60  Tube Feed Duration (in Hours): 24  Tube Feed Start Time: 08:30  Supplement Feeding Modality:  Nasogastric  Probiotic Yogurt/Smoothie Cans or Servings Per Day:  2       Frequency:  Daily  Entered: Jul 9 2021 11:49AM    
This patient has been assessed with a concern for Malnutrition and has been determined to have a diagnosis/diagnoses of Severe protein-calorie malnutrition.    This patient is being managed with:   Diet Easy to Chew-  Supplement Feeding Modality:  Oral  Ensure Enlive Cans or Servings Per Day:  2       Frequency:  Daily  Entered: Nov 12 2021  2:28PM    
This patient has been assessed with a concern for Malnutrition and has been determined to have a diagnosis/diagnoses of Severe protein-calorie malnutrition.    This patient is being managed with:   Diet NPO with Tube Feed-  Tube Feeding Modality: Nasogastric  Vital 1.5 Tank (VITAL1.5RTH)  Total Volume for 24 Hours (mL): 1560  Continuous  Starting Tube Feed Rate {mL per Hour}: 10  Increase Tube Feed Rate by (mL): 5     Every 24 hours  Until Goal Tube Feed Rate (mL per Hour): 65  Tube Feed Duration (in Hours): 24  Tube Feed Start Time: 11:45  Supplement Feeding Modality:  Nasogastric  Probiotic Yogurt/Smoothie Cans or Servings Per Day:  2       Frequency:  Daily  Entered: Aug 29 2021 12:08AM    
This patient has been assessed with a concern for Malnutrition and has been determined to have a diagnosis/diagnoses of Severe protein-calorie malnutrition.    This patient is being managed with:   Diet NPO with Tube Feed-  Tube Feeding Modality: Nasogastric  Vital 1.5 Tank (VITAL1.5RTH)  Total Volume for 24 Hours (mL): 1560  Continuous  Starting Tube Feed Rate {mL per Hour}: 10  Increase Tube Feed Rate by (mL): 5     Every 24 hours  Until Goal Tube Feed Rate (mL per Hour): 65  Tube Feed Duration (in Hours): 24  Tube Feed Start Time: 11:45  Supplement Feeding Modality:  Nasogastric  Probiotic Yogurt/Smoothie Cans or Servings Per Day:  2       Frequency:  Daily  Entered: Sep 10 2021  9:16PM    
This patient has been assessed with a concern for Malnutrition and has been determined to have a diagnosis/diagnoses of Severe protein-calorie malnutrition.    This patient is being managed with:   Diet NPO with Tube Feed-  Tube Feeding Modality: Nasogastric  Vital 1.5 Tank (VITAL1.5RTH)  Total Volume for 24 Hours (mL): 960  Continuous  Starting Tube Feed Rate {mL per Hour}: 20  Increase Tube Feed Rate by (mL): 5     Every 6 hours  Until Goal Tube Feed Rate (mL per Hour): 40  Tube Feed Duration (in Hours): 24  Tube Feed Start Time: 10:00  Entered: Sep 29 2021 10:11AM    
This patient has been assessed with a concern for Malnutrition and has been determined to have a diagnosis/diagnoses of Moderate protein-calorie malnutrition.    This patient is being managed with:   Diet NPO after Midnight-     NPO Start Date: 24-Jun-2021   NPO Start Time: 23:59  Entered: Jun 24 2021  1:07PM    Diet NPO with Tube Feed-  Tube Feeding Modality: Nasogastric  Vital AF (VITALAFRTH)  Total Volume for 24 Hours (mL): 1440  Continuous  Starting Tube Feed Rate {mL per Hour}: 10  Increase Tube Feed Rate by (mL): 10     Every 6 hours  Until Goal Tube Feed Rate (mL per Hour): 60  Tube Feed Duration (in Hours): 24  Tube Feed Start Time: 08:30  Entered: Jun 24 2021 10:08AM    
This patient has been assessed with a concern for Malnutrition and has been determined to have a diagnosis/diagnoses of Moderate protein-calorie malnutrition.    This patient is being managed with:   Diet NPO with Tube Feed-  Tube Feeding Modality: Nasogastric  Jevity 1.2 Tank (JEVITY1.2RTH)  Total Volume for 24 Hours (mL): 1440  Continuous  Starting Tube Feed Rate {mL per Hour}: 10  Increase Tube Feed Rate by (mL): 10     Every 6 hours  Until Goal Tube Feed Rate (mL per Hour): 60  Tube Feed Duration (in Hours): 24  Tube Feed Start Time: 09:30  Supplement Feeding Modality:  Nasogastric  Probiotic Yogurt/Smoothie Cans or Servings Per Day:  2       Frequency:  Daily  Entered: Jul 13 2021  9:18AM    
This patient has been assessed with a concern for Malnutrition and has been determined to have a diagnosis/diagnoses of Moderate protein-calorie malnutrition.    This patient is being managed with:   Diet NPO with Tube Feed-  Tube Feeding Modality: Nasogastric  Jevity 1.2 Tank (JEVITY1.2RTH)  Total Volume for 24 Hours (mL): 1440  Continuous  Starting Tube Feed Rate {mL per Hour}: 10  Increase Tube Feed Rate by (mL): 10     Every 6 hours  Until Goal Tube Feed Rate (mL per Hour): 60  Tube Feed Duration (in Hours): 24  Tube Feed Start Time: 09:30  Supplement Feeding Modality:  Nasogastric  Probiotic Yogurt/Smoothie Cans or Servings Per Day:  2       Frequency:  Daily  Entered: Jul 13 2021  9:18AM    
This patient has been assessed with a concern for Malnutrition and has been determined to have a diagnosis/diagnoses of Moderate protein-calorie malnutrition.    This patient is being managed with:   Diet NPO with Tube Feed-  Tube Feeding Modality: Nasogastric  Vital AF (VITALAFRTH)  Total Volume for 24 Hours (mL): 1440  Continuous  Starting Tube Feed Rate {mL per Hour}: 10  Increase Tube Feed Rate by (mL): 10     Every 6 hours  Until Goal Tube Feed Rate (mL per Hour): 60  Tube Feed Duration (in Hours): 24  Tube Feed Start Time: 08:30  Banatrol TF     Qty per Day:  2  Supplement Feeding Modality:  Nasogastric  Probiotic Yogurt/Smoothie Cans or Servings Per Day:  2       Frequency:  Daily  Entered: Jun 29 2021  9:44AM    
This patient has been assessed with a concern for Malnutrition and has been determined to have a diagnosis/diagnoses of Moderate protein-calorie malnutrition.    This patient is being managed with:   Diet NPO with Tube Feed-  Tube Feeding Modality: Nasogastric  Vital AF (VITALAFRTH)  Total Volume for 24 Hours (mL): 1440  Continuous  Starting Tube Feed Rate {mL per Hour}: 10  Increase Tube Feed Rate by (mL): 10     Every 6 hours  Until Goal Tube Feed Rate (mL per Hour): 60  Tube Feed Duration (in Hours): 24  Tube Feed Start Time: 08:30  Supplement Feeding Modality:  Nasogastric  Probiotic Yogurt/Smoothie Cans or Servings Per Day:  2       Frequency:  Daily  Entered: Jul 9 2021 11:49AM    
This patient has been assessed with a concern for Malnutrition and has been determined to have a diagnosis/diagnoses of Severe protein-calorie malnutrition.    This patient is being managed with:   Diet NPO with Tube Feed-  Tube Feeding Modality: Nasogastric  Jevity 1.2 Tank (JEVITY1.2RTH)  Total Volume for 24 Hours (mL): 1440  Continuous  Starting Tube Feed Rate {mL per Hour}: 10  Increase Tube Feed Rate by (mL): 10     Every 6 hours  Until Goal Tube Feed Rate (mL per Hour): 60  Tube Feed Duration (in Hours): 24  Tube Feed Start Time: 11:00  No Carb Prosource TF     Qty per Day:  2  Supplement Feeding Modality:  Nasogastric  Probiotic Yogurt/Smoothie Cans or Servings Per Day:  2       Frequency:  Daily  Entered: Jul 30 2021  3:25PM    
This patient has been assessed with a concern for Malnutrition and has been determined to have a diagnosis/diagnoses of Severe protein-calorie malnutrition.    This patient is being managed with:   Diet NPO with Tube Feed-  Tube Feeding Modality: Nasogastric  Jevity 1.2 Tank (JEVITY1.2RTH)  Total Volume for 24 Hours (mL): 1440  Continuous  Starting Tube Feed Rate {mL per Hour}: 10  Increase Tube Feed Rate by (mL): 10     Every 6 hours  Until Goal Tube Feed Rate (mL per Hour): 60  Tube Feed Duration (in Hours): 24  Tube Feed Start Time: 11:00  No Carb Prosource TF     Qty per Day:  2  Supplement Feeding Modality:  Nasogastric  Probiotic Yogurt/Smoothie Cans or Servings Per Day:  2       Frequency:  Daily  Entered: Jul 30 2021  3:25PM    
This patient has been assessed with a concern for Malnutrition and has been determined to have a diagnosis/diagnoses of Severe protein-calorie malnutrition.    This patient is being managed with:   Diet NPO with Tube Feed-  Tube Feeding Modality: Nasogastric  TwoCal HN (TWOCALHNRTH)  Total Volume for 24 Hours (mL): 1080  Continuous  Starting Tube Feed Rate {mL per Hour}: 10  Increase Tube Feed Rate by (mL): 10     Every 6 hours  Until Goal Tube Feed Rate (mL per Hour): 45  Tube Feed Duration (in Hours): 24  Tube Feed Start Time: 13:25  Entered: Aug 18 2021  1:37PM    
This patient has been assessed with a concern for Malnutrition and has been determined to have a diagnosis/diagnoses of Severe protein-calorie malnutrition.    This patient is being managed with:   Diet NPO with Tube Feed-  Tube Feeding Modality: Nasogastric  TwoCal HN (TWOCALHNRTH)  Total Volume for 24 Hours (mL): 240  Continuous  Starting Tube Feed Rate {mL per Hour}: 10  Until Goal Tube Feed Rate (mL per Hour): 10  Tube Feed Duration (in Hours): 24  Tube Feed Start Time: 13:25  Entered: Aug 15 2021  1:24PM    
This patient has been assessed with a concern for Malnutrition and has been determined to have a diagnosis/diagnoses of Severe protein-calorie malnutrition.    This patient is being managed with:   Diet NPO with Tube Feed-  Tube Feeding Modality: Nasogastric  Vital 1.5 Tank (VITAL1.5RTH)  Total Volume for 24 Hours (mL): 1560  Continuous  Starting Tube Feed Rate {mL per Hour}: 10  Increase Tube Feed Rate by (mL): 10     Every 6 hours  Until Goal Tube Feed Rate (mL per Hour): 65  Tube Feed Duration (in Hours): 24  Tube Feed Start Time: 11:45  Supplement Feeding Modality:  Nasogastric  Probiotic Yogurt/Smoothie Cans or Servings Per Day:  2       Frequency:  Daily  Entered: Aug 26 2021 11:47AM    
This patient has been assessed with a concern for Malnutrition and has been determined to have a diagnosis/diagnoses of Severe protein-calorie malnutrition.    This patient is being managed with:   Diet NPO with Tube Feed-  Tube Feeding Modality: Nasogastric  Vital 1.5 Tank (VITAL1.5RTH)  Total Volume for 24 Hours (mL): 1560  Continuous  Starting Tube Feed Rate {mL per Hour}: 10  Increase Tube Feed Rate by (mL): 5     Every 24 hours  Until Goal Tube Feed Rate (mL per Hour): 65  Tube Feed Duration (in Hours): 24  Tube Feed Start Time: 11:45  Supplement Feeding Modality:  Nasogastric  Probiotic Yogurt/Smoothie Cans or Servings Per Day:  2       Frequency:  Daily  Entered: Aug 29 2021 12:08AM    
This patient has been assessed with a concern for Malnutrition and has been determined to have a diagnosis/diagnoses of Severe protein-calorie malnutrition.    This patient is being managed with:   Diet NPO with Tube Feed-  Tube Feeding Modality: Nasogastric  Vital 1.5 Tank (VITAL1.5RTH)  Total Volume for 24 Hours (mL): 1560  Continuous  Starting Tube Feed Rate {mL per Hour}: 10  Increase Tube Feed Rate by (mL): 5     Every 24 hours  Until Goal Tube Feed Rate (mL per Hour): 65  Tube Feed Duration (in Hours): 24  Tube Feed Start Time: 11:45  Supplement Feeding Modality:  Nasogastric  Probiotic Yogurt/Smoothie Cans or Servings Per Day:  2       Frequency:  Daily  Entered: Aug 29 2021 12:08AM    
This patient has been assessed with a concern for Malnutrition and has been determined to have a diagnosis/diagnoses of Severe protein-calorie malnutrition.    This patient is being managed with:   Diet NPO with Tube Feed-  Tube Feeding Modality: Nasogastric  Vital 1.5 Tank (VITAL1.5RTH)  Total Volume for 24 Hours (mL): 1560  Continuous  Starting Tube Feed Rate {mL per Hour}: 10  Increase Tube Feed Rate by (mL): 5     Every 24 hours  Until Goal Tube Feed Rate (mL per Hour): 65  Tube Feed Duration (in Hours): 24  Tube Feed Start Time: 11:45  Supplement Feeding Modality:  Nasogastric  Probiotic Yogurt/Smoothie Cans or Servings Per Day:  2       Frequency:  Daily  Entered: Sep 10 2021  9:16PM    
This patient has been assessed with a concern for Malnutrition and has been determined to have a diagnosis/diagnoses of Severe protein-calorie malnutrition.    This patient is being managed with:   Diet NPO with Tube Feed-  Tube Feeding Modality: Nasogastric  Vital 1.5 Tank (VITAL1.5RTH)  Total Volume for 24 Hours (mL): 960  Continuous  Starting Tube Feed Rate {mL per Hour}: 20  Increase Tube Feed Rate by (mL): 5     Every 6 hours  Until Goal Tube Feed Rate (mL per Hour): 40  Tube Feed Duration (in Hours): 24  Tube Feed Start Time: 10:00  Entered: Sep 29 2021 10:11AM    
This patient has been assessed with a concern for Malnutrition and has been determined to have a diagnosis/diagnoses of Severe protein-calorie malnutrition.    This patient is being managed with:   Diet NPO with Tube Feed-  Tube Feeding Modality: Nasogastric  Vital AF (VITALAFRTH)  Total Volume for 24 Hours (mL): 1440  Continuous  Starting Tube Feed Rate {mL per Hour}: 10  Increase Tube Feed Rate by (mL): 10     Every 8 hours  Until Goal Tube Feed Rate (mL per Hour): 60  Tube Feed Duration (in Hours): 24  Tube Feed Start Time: 14:00  No Carb Prosource TF     Qty per Day:  2  Supplement Feeding Modality:  Nasogastric  Probiotic Yogurt/Smoothie Cans or Servings Per Day:  2       Frequency:  Daily  Entered: Aug  9 2021  1:50PM    
This patient has been assessed with a concern for Malnutrition and has been determined to have a diagnosis/diagnoses of Severe protein-calorie malnutrition.    This patient is being managed with:   Diet NPO-  Entered: Oct 18 2021  8:24PM    
This patient has been assessed with a concern for Malnutrition and has been determined to have a diagnosis/diagnoses of Severe protein-calorie malnutrition.    This patient is being managed with:   Diet Pureed-  Mildly Thick Liquids (MILDTHICKLIQS)  Tube Feeding Modality: Nasogastric  Vital 1.5 Tank (VITAL1.5RTH)  Total Volume for 24 Hours (mL): 1200  Continuous  Starting Tube Feed Rate {mL per Hour}: 50  Until Goal Tube Feed Rate (mL per Hour): 50  Tube Feed Duration (in Hours): 24  Tube Feed Start Time: 15:40  Entered: Oct 29 2021  3:38PM    
This patient has been assessed with a concern for Malnutrition and has been determined to have a diagnosis/diagnoses of Moderate protein-calorie malnutrition.    This patient is being managed with:   Diet NPO after Midnight-     NPO Start Date: 24-Jun-2021   NPO Start Time: 23:59  Entered: Jun 24 2021  1:07PM    Diet NPO with Tube Feed-  Tube Feeding Modality: Nasogastric  Vital AF (VITALAFRTH)  Total Volume for 24 Hours (mL): 1440  Continuous  Starting Tube Feed Rate {mL per Hour}: 10  Increase Tube Feed Rate by (mL): 10     Every 6 hours  Until Goal Tube Feed Rate (mL per Hour): 60  Tube Feed Duration (in Hours): 24  Tube Feed Start Time: 08:30  Entered: Jun 24 2021 10:08AM    
This patient has been assessed with a concern for Malnutrition and has been determined to have a diagnosis/diagnoses of Moderate protein-calorie malnutrition.    This patient is being managed with:   Diet NPO after Midnight-     NPO Start Date: 25-Jul-2021   NPO Start Time: 23:59  Entered: Jul 25 2021 10:58AM    Diet NPO with Tube Feed-  Tube Feeding Modality: Nasogastric  Jevity 1.2 Tank (JEVITY1.2RTH)  Total Volume for 24 Hours (mL): 1440  Continuous  Starting Tube Feed Rate {mL per Hour}: 10  Increase Tube Feed Rate by (mL): 10     Every 6 hours  Until Goal Tube Feed Rate (mL per Hour): 60  Tube Feed Duration (in Hours): 24  Tube Feed Start Time: 09:30  Supplement Feeding Modality:  Nasogastric  Probiotic Yogurt/Smoothie Cans or Servings Per Day:  2       Frequency:  Daily  Entered: Jul 13 2021  9:18AM    
This patient has been assessed with a concern for Malnutrition and has been determined to have a diagnosis/diagnoses of Moderate protein-calorie malnutrition.    This patient is being managed with:   Diet NPO with Tube Feed-  Tube Feeding Modality: Nasogastric  Jevity 1.2 Tank (JEVITY1.2RTH)  Total Volume for 24 Hours (mL): 1440  Continuous  Starting Tube Feed Rate {mL per Hour}: 10  Increase Tube Feed Rate by (mL): 10     Every 6 hours  Until Goal Tube Feed Rate (mL per Hour): 60  Tube Feed Duration (in Hours): 24  Tube Feed Start Time: 09:30  Supplement Feeding Modality:  Nasogastric  Probiotic Yogurt/Smoothie Cans or Servings Per Day:  2       Frequency:  Daily  Entered: Jul 13 2021  9:18AM    
This patient has been assessed with a concern for Malnutrition and has been determined to have a diagnosis/diagnoses of Moderate protein-calorie malnutrition.    This patient is being managed with:   Diet NPO with Tube Feed-  Tube Feeding Modality: Nasogastric  Vital AF (VITALAFRTH)  Total Volume for 24 Hours (mL): 1440  Continuous  Starting Tube Feed Rate {mL per Hour}: 10  Increase Tube Feed Rate by (mL): 10     Every 6 hours  Until Goal Tube Feed Rate (mL per Hour): 60  Tube Feed Duration (in Hours): 24  Tube Feed Start Time: 08:30  Banatrol TF     Qty per Day:  2  Supplement Feeding Modality:  Nasogastric  Probiotic Yogurt/Smoothie Cans or Servings Per Day:  2       Frequency:  Daily  Entered: Jun 29 2021  9:44AM    
This patient has been assessed with a concern for Malnutrition and has been determined to have a diagnosis/diagnoses of Moderate protein-calorie malnutrition.    This patient is being managed with:   Diet NPO with Tube Feed-  Tube Feeding Modality: Nasogastric  Vital AF (VITALAFRTH)  Total Volume for 24 Hours (mL): 1440  Continuous  Starting Tube Feed Rate {mL per Hour}: 10  Increase Tube Feed Rate by (mL): 10     Every 6 hours  Until Goal Tube Feed Rate (mL per Hour): 60  Tube Feed Duration (in Hours): 24  Tube Feed Start Time: 08:30  Supplement Feeding Modality:  Nasogastric  Probiotic Yogurt/Smoothie Cans or Servings Per Day:  2       Frequency:  Daily  Entered: Jul 9 2021 11:49AM    
This patient has been assessed with a concern for Malnutrition and has been determined to have a diagnosis/diagnoses of Moderate protein-calorie malnutrition.    This patient is being managed with:   Diet NPO-  Entered: Jun 22 2021  9:04AM    
This patient has been assessed with a concern for Malnutrition and has been determined to have a diagnosis/diagnoses of Severe protein-calorie malnutrition.      
This patient has been assessed with a concern for Malnutrition and has been determined to have a diagnosis/diagnoses of Severe protein-calorie malnutrition.    This patient is being managed with:   Diet NPO after Midnight-     NPO Start Date: 25-Jul-2021   NPO Start Time: 23:59  Entered: Jul 25 2021 10:58AM    Diet NPO with Tube Feed-  Tube Feeding Modality: Nasogastric  Jevity 1.2 Tank (JEVITY1.2RTH)  Total Volume for 24 Hours (mL): 1440  Continuous  Starting Tube Feed Rate {mL per Hour}: 10  Increase Tube Feed Rate by (mL): 10     Every 6 hours  Until Goal Tube Feed Rate (mL per Hour): 60  Tube Feed Duration (in Hours): 24  Tube Feed Start Time: 09:30  Supplement Feeding Modality:  Nasogastric  Probiotic Yogurt/Smoothie Cans or Servings Per Day:  2       Frequency:  Daily  Entered: Jul 13 2021  9:18AM    
This patient has been assessed with a concern for Malnutrition and has been determined to have a diagnosis/diagnoses of Severe protein-calorie malnutrition.    This patient is being managed with:   Diet NPO after Midnight-     NPO Start Date: 29-Jul-2021   NPO Start Time: 23:59  Entered: Jul 29 2021  3:29PM    Diet NPO after Midnight-     NPO Start Date: 29-Jul-2021   NPO Start Time: 23:59  Entered: Jul 29 2021 11:29AM    Diet NPO with Tube Feed-  Tube Feeding Modality: Nasogastric  Jevity 1.2 Tank (JEVITY1.2RTH)  Total Volume for 24 Hours (mL): 1440  Continuous  Starting Tube Feed Rate {mL per Hour}: 10  Increase Tube Feed Rate by (mL): 10     Every 6 hours  Until Goal Tube Feed Rate (mL per Hour): 60  Tube Feed Duration (in Hours): 24  Tube Feed Start Time: 11:00  Supplement Feeding Modality:  Nasogastric  Probiotic Yogurt/Smoothie Cans or Servings Per Day:  2       Frequency:  Daily  Entered: Jul 27 2021 10:49AM    
This patient has been assessed with a concern for Malnutrition and has been determined to have a diagnosis/diagnoses of Severe protein-calorie malnutrition.    This patient is being managed with:   Diet NPO with Tube Feed-  Tube Feeding Modality: Nasogastric  Jevity 1.2 Tank (JEVITY1.2RTH)  Total Volume for 24 Hours (mL): 1440  Continuous  Starting Tube Feed Rate {mL per Hour}: 10  Increase Tube Feed Rate by (mL): 10     Every 6 hours  Until Goal Tube Feed Rate (mL per Hour): 60  Tube Feed Duration (in Hours): 24  Tube Feed Start Time: 11:00  No Carb Prosource TF     Qty per Day:  2  Supplement Feeding Modality:  Nasogastric  Probiotic Yogurt/Smoothie Cans or Servings Per Day:  2       Frequency:  Daily  Entered: Jul 30 2021  3:25PM    
This patient has been assessed with a concern for Malnutrition and has been determined to have a diagnosis/diagnoses of Severe protein-calorie malnutrition.    This patient is being managed with:   Diet NPO with Tube Feed-  Tube Feeding Modality: Nasogastric  Jevity 1.2 Tank (JEVITY1.2RTH)  Total Volume for 24 Hours (mL): 1440  Continuous  Starting Tube Feed Rate {mL per Hour}: 10  Increase Tube Feed Rate by (mL): 10     Every 6 hours  Until Goal Tube Feed Rate (mL per Hour): 60  Tube Feed Duration (in Hours): 24  Tube Feed Start Time: 11:00  No Carb Prosource TF     Qty per Day:  2  Supplement Feeding Modality:  Nasogastric  Probiotic Yogurt/Smoothie Cans or Servings Per Day:  2       Frequency:  Daily  Entered: Jul 30 2021  3:25PM    
This patient has been assessed with a concern for Malnutrition and has been determined to have a diagnosis/diagnoses of Severe protein-calorie malnutrition.    This patient is being managed with:   Diet NPO with Tube Feed-  Tube Feeding Modality: Nasogastric  Jevity 1.2 Tank (JEVITY1.2RTH)  Total Volume for 24 Hours (mL): 1440  Continuous  Starting Tube Feed Rate {mL per Hour}: 10  Increase Tube Feed Rate by (mL): 10     Every 6 hours  Until Goal Tube Feed Rate (mL per Hour): 60  Tube Feed Duration (in Hours): 24  Tube Feed Start Time: 11:00  Supplement Feeding Modality:  Nasogastric  Probiotic Yogurt/Smoothie Cans or Servings Per Day:  2       Frequency:  Daily  Entered: Jul 27 2021 10:49AM    
This patient has been assessed with a concern for Malnutrition and has been determined to have a diagnosis/diagnoses of Severe protein-calorie malnutrition.    This patient is being managed with:   Diet NPO with Tube Feed-  Tube Feeding Modality: Nasogastric  Jevity 1.2 Tank (JEVITY1.2RTH)  Total Volume for 24 Hours (mL): 1440  Continuous  Starting Tube Feed Rate {mL per Hour}: 30  Increase Tube Feed Rate by (mL): 10     Every 4 hours  Until Goal Tube Feed Rate (mL per Hour): 60  Tube Feed Duration (in Hours): 24  Tube Feed Start Time: 16:00  No Carb Prosource TF     Qty per Day:  2  Supplement Feeding Modality:  Nasogastric  Probiotic Yogurt/Smoothie Cans or Servings Per Day:  2       Frequency:  Daily  Entered: Aug  6 2021  4:10PM    
This patient has been assessed with a concern for Malnutrition and has been determined to have a diagnosis/diagnoses of Severe protein-calorie malnutrition.    This patient is being managed with:   Diet NPO with Tube Feed-  Tube Feeding Modality: Nasogastric  Jevity 1.2 Tank (JEVITY1.2RTH)  Total Volume for 24 Hours (mL): 1440  Continuous  Starting Tube Feed Rate {mL per Hour}: 30  Increase Tube Feed Rate by (mL): 10     Every 4 hours  Until Goal Tube Feed Rate (mL per Hour): 60  Tube Feed Duration (in Hours): 24  Tube Feed Start Time: 16:00  No Carb Prosource TF     Qty per Day:  2  Supplement Feeding Modality:  Nasogastric  Probiotic Yogurt/Smoothie Cans or Servings Per Day:  2       Frequency:  Daily  Entered: Aug  6 2021  4:10PM    
This patient has been assessed with a concern for Malnutrition and has been determined to have a diagnosis/diagnoses of Severe protein-calorie malnutrition.    This patient is being managed with:   Diet NPO with Tube Feed-  Tube Feeding Modality: Nasogastric  TwoCal HN (TWOCALHNRTH)  Total Volume for 24 Hours (mL): 1080  Continuous  Starting Tube Feed Rate {mL per Hour}: 10  Increase Tube Feed Rate by (mL): 10     Every 6 hours  Until Goal Tube Feed Rate (mL per Hour): 45  Tube Feed Duration (in Hours): 24  Tube Feed Start Time: 13:25  Entered: Aug 18 2021  1:37PM    
This patient has been assessed with a concern for Malnutrition and has been determined to have a diagnosis/diagnoses of Severe protein-calorie malnutrition.    This patient is being managed with:   Diet NPO with Tube Feed-  Tube Feeding Modality: Nasogastric  TwoCal HN (TWOCALHNRTH)  Total Volume for 24 Hours (mL): 1200  Continuous  Starting Tube Feed Rate {mL per Hour}: 50     Every 6 hours  Until Goal Tube Feed Rate (mL per Hour): 50  Tube Feed Duration (in Hours): 24  Tube Feed Start Time: 15:30  Banatrol TF     Qty per Day:  2  Supplement Feeding Modality:  Nasogastric  Probiotic Yogurt/Smoothie Cans or Servings Per Day:  2       Frequency:  Daily  Entered: Aug 25 2021  9:10AM    
This patient has been assessed with a concern for Malnutrition and has been determined to have a diagnosis/diagnoses of Severe protein-calorie malnutrition.    This patient is being managed with:   Diet NPO with Tube Feed-  Tube Feeding Modality: Nasogastric  TwoCal HN (TWOCALHNRTH)  Total Volume for 24 Hours (mL): 1200  Continuous  Starting Tube Feed Rate {mL per Hour}: 50     Every 6 hours  Until Goal Tube Feed Rate (mL per Hour): 50  Tube Feed Duration (in Hours): 24  Tube Feed Start Time: 15:30  Supplement Feeding Modality:  Nasogastric  Probiotic Yogurt/Smoothie Cans or Servings Per Day:  2       Frequency:  Daily  Entered: Aug 26 2021  3:52AM    
This patient has been assessed with a concern for Malnutrition and has been determined to have a diagnosis/diagnoses of Severe protein-calorie malnutrition.    This patient is being managed with:   Diet NPO with Tube Feed-  Tube Feeding Modality: Nasogastric  TwoCal HN (TWOCALHNRTH)  Total Volume for 24 Hours (mL): 240  Continuous  Starting Tube Feed Rate {mL per Hour}: 10  Until Goal Tube Feed Rate (mL per Hour): 10  Tube Feed Duration (in Hours): 24  Tube Feed Start Time: 13:25  Entered: Aug 15 2021  1:24PM    
This patient has been assessed with a concern for Malnutrition and has been determined to have a diagnosis/diagnoses of Severe protein-calorie malnutrition.    This patient is being managed with:   Diet NPO with Tube Feed-  Tube Feeding Modality: Nasogastric  Vital 1.5 Tank (VITAL1.5RTH)  Total Volume for 24 Hours (mL): 1560  Continuous  Starting Tube Feed Rate {mL per Hour}: 10  Increase Tube Feed Rate by (mL): 10     Every 6 hours  Until Goal Tube Feed Rate (mL per Hour): 65  Tube Feed Duration (in Hours): 24  Tube Feed Start Time: 11:45  Supplement Feeding Modality:  Nasogastric  Probiotic Yogurt/Smoothie Cans or Servings Per Day:  2       Frequency:  Daily  Entered: Aug 26 2021 11:47AM    
This patient has been assessed with a concern for Malnutrition and has been determined to have a diagnosis/diagnoses of Severe protein-calorie malnutrition.    This patient is being managed with:   Diet NPO with Tube Feed-  Tube Feeding Modality: Nasogastric  Vital 1.5 Tank (VITAL1.5RTH)  Total Volume for 24 Hours (mL): 1560  Continuous  Starting Tube Feed Rate {mL per Hour}: 10  Increase Tube Feed Rate by (mL): 5     Every 24 hours  Until Goal Tube Feed Rate (mL per Hour): 65  Tube Feed Duration (in Hours): 24  Tube Feed Start Time: 11:45  Supplement Feeding Modality:  Nasogastric  Probiotic Yogurt/Smoothie Cans or Servings Per Day:  2       Frequency:  Daily  Entered: Aug 29 2021 12:08AM    
This patient has been assessed with a concern for Malnutrition and has been determined to have a diagnosis/diagnoses of Severe protein-calorie malnutrition.    This patient is being managed with:   Diet NPO with Tube Feed-  Tube Feeding Modality: Nasogastric  Vital 1.5 Tank (VITAL1.5RTH)  Total Volume for 24 Hours (mL): 1560  Continuous  Starting Tube Feed Rate {mL per Hour}: 10  Increase Tube Feed Rate by (mL): 5     Every 24 hours  Until Goal Tube Feed Rate (mL per Hour): 65  Tube Feed Duration (in Hours): 24  Tube Feed Start Time: 11:45  Supplement Feeding Modality:  Nasogastric  Probiotic Yogurt/Smoothie Cans or Servings Per Day:  2       Frequency:  Daily  Entered: Sep 10 2021  9:16PM    
This patient has been assessed with a concern for Malnutrition and has been determined to have a diagnosis/diagnoses of Severe protein-calorie malnutrition.    This patient is being managed with:   Diet NPO with Tube Feed-  Tube Feeding Modality: Nasogastric  Vital 1.5 Tank (VITAL1.5RTH)  Total Volume for 24 Hours (mL): 960  Continuous  Starting Tube Feed Rate {mL per Hour}: 20  Increase Tube Feed Rate by (mL): 5     Every 6 hours  Until Goal Tube Feed Rate (mL per Hour): 40  Tube Feed Duration (in Hours): 24  Tube Feed Start Time: 10:00  Entered: Sep 29 2021 10:11AM    
This patient has been assessed with a concern for Malnutrition and has been determined to have a diagnosis/diagnoses of Severe protein-calorie malnutrition.    This patient is being managed with:   Diet NPO with Tube Feed-  Tube Feeding Modality: Nasogastric  Vital AF (VITALAFRTH)  Total Volume for 24 Hours (mL): 1440  Continuous  Starting Tube Feed Rate {mL per Hour}: 10  Increase Tube Feed Rate by (mL): 10     Every 6 hours  Until Goal Tube Feed Rate (mL per Hour): 60  Tube Feed Duration (in Hours): 24  Tube Feed Start Time: 14:00  No Carb Prosource TF     Qty per Day:  2  Supplement Feeding Modality:  Nasogastric  Probiotic Yogurt/Smoothie Cans or Servings Per Day:  2       Frequency:  Daily  Entered: Aug 10 2021  6:34PM    
This patient has been assessed with a concern for Malnutrition and has been determined to have a diagnosis/diagnoses of Severe protein-calorie malnutrition.    This patient is being managed with:   Diet NPO with Tube Feed-  Tube Feeding Modality: Nasogastric  Vital AF (VITALAFRTH)  Total Volume for 24 Hours (mL): 240  Bolus  Total Volume of Bolus (mL):  240  Total # of Feeds: 1  Tube Feed Frequency: Every 24 hours   Tube Feed Start Time: 18:00  Bolus Feed Rate (mL per Hour): 960   Bolus Feed Duration (in Hours): 0.25  Entered: Aug 12 2021  1:47PM    
This patient has been assessed with a concern for Malnutrition and has been determined to have a diagnosis/diagnoses of Severe protein-calorie malnutrition.    This patient is being managed with:   Diet NPO-  Entered: Aug 13 2021  1:07PM    
This patient has been assessed with a concern for Malnutrition and has been determined to have a diagnosis/diagnoses of Moderate protein-calorie malnutrition.    This patient is being managed with:   Diet NPO after Midnight-     NPO Start Date: 25-Jul-2021   NPO Start Time: 23:59  Entered: Jul 25 2021 10:58AM    Diet NPO with Tube Feed-  Tube Feeding Modality: Nasogastric  Jevity 1.2 Tank (JEVITY1.2RTH)  Total Volume for 24 Hours (mL): 1440  Continuous  Starting Tube Feed Rate {mL per Hour}: 10  Increase Tube Feed Rate by (mL): 10     Every 6 hours  Until Goal Tube Feed Rate (mL per Hour): 60  Tube Feed Duration (in Hours): 24  Tube Feed Start Time: 09:30  Supplement Feeding Modality:  Nasogastric  Probiotic Yogurt/Smoothie Cans or Servings Per Day:  2       Frequency:  Daily  Entered: Jul 13 2021  9:18AM    
This patient has been assessed with a concern for Malnutrition and has been determined to have a diagnosis/diagnoses of Moderate protein-calorie malnutrition.    This patient is being managed with:   Diet NPO with Tube Feed-  Tube Feeding Modality: Nasogastric  Jevity 1.2 Tank (JEVITY1.2RTH)  Total Volume for 24 Hours (mL): 1440  Continuous  Starting Tube Feed Rate {mL per Hour}: 10  Increase Tube Feed Rate by (mL): 10     Every 6 hours  Until Goal Tube Feed Rate (mL per Hour): 60  Tube Feed Duration (in Hours): 24  Tube Feed Start Time: 09:30  Supplement Feeding Modality:  Nasogastric  Probiotic Yogurt/Smoothie Cans or Servings Per Day:  2       Frequency:  Daily  Entered: Jul 13 2021  9:18AM    
This patient has been assessed with a concern for Malnutrition and has been determined to have a diagnosis/diagnoses of Moderate protein-calorie malnutrition.    This patient is being managed with:   Diet NPO with Tube Feed-  Tube Feeding Modality: Nasogastric  Vital AF (VITALAFRTH)  Total Volume for 24 Hours (mL): 1440  Continuous  Starting Tube Feed Rate {mL per Hour}: 10  Increase Tube Feed Rate by (mL): 10     Every 6 hours  Until Goal Tube Feed Rate (mL per Hour): 60  Tube Feed Duration (in Hours): 24  Tube Feed Start Time: 08:30  Banatrol TF     Qty per Day:  2  Supplement Feeding Modality:  Nasogastric  Probiotic Yogurt/Smoothie Cans or Servings Per Day:  2       Frequency:  Daily  Entered: Jun 29 2021  9:44AM    
This patient has been assessed with a concern for Malnutrition and has been determined to have a diagnosis/diagnoses of Moderate protein-calorie malnutrition.    This patient is being managed with:   Diet NPO with Tube Feed-  Tube Feeding Modality: Nasogastric  Vital AF (VITALAFRTH)  Total Volume for 24 Hours (mL): 1440  Continuous  Starting Tube Feed Rate {mL per Hour}: 10  Increase Tube Feed Rate by (mL): 10     Every 6 hours  Until Goal Tube Feed Rate (mL per Hour): 60  Tube Feed Duration (in Hours): 24  Tube Feed Start Time: 08:30  Banatrol TF     Qty per Day:  2  Supplement Feeding Modality:  Nasogastric  Probiotic Yogurt/Smoothie Cans or Servings Per Day:  2       Frequency:  Daily  Entered: Jun 29 2021  9:44AM    
This patient has been assessed with a concern for Malnutrition and has been determined to have a diagnosis/diagnoses of Moderate protein-calorie malnutrition.    This patient is being managed with:   Diet NPO with Tube Feed-  Tube Feeding Modality: Nasogastric  Vital AF (VITALAFRTH)  Total Volume for 24 Hours (mL): 1440  Continuous  Starting Tube Feed Rate {mL per Hour}: 10  Increase Tube Feed Rate by (mL): 10     Every 6 hours  Until Goal Tube Feed Rate (mL per Hour): 60  Tube Feed Duration (in Hours): 24  Tube Feed Start Time: 08:30  Entered: Jun 24 2021 10:08AM    
This patient has been assessed with a concern for Malnutrition and has been determined to have a diagnosis/diagnoses of Severe protein-calorie malnutrition.    This patient is being managed with:   Diet NPO with Tube Feed-  Tube Feeding Modality: Nasogastric  Jevity 1.2 Tank (JEVITY1.2RTH)  Total Volume for 24 Hours (mL): 1440  Continuous  Starting Tube Feed Rate {mL per Hour}: 10  Increase Tube Feed Rate by (mL): 10     Every 6 hours  Until Goal Tube Feed Rate (mL per Hour): 60  Tube Feed Duration (in Hours): 24  Tube Feed Start Time: 11:00  No Carb Prosource TF     Qty per Day:  2  Supplement Feeding Modality:  Nasogastric  Probiotic Yogurt/Smoothie Cans or Servings Per Day:  2       Frequency:  Daily  Entered: Jul 30 2021  3:25PM    
This patient has been assessed with a concern for Malnutrition and has been determined to have a diagnosis/diagnoses of Severe protein-calorie malnutrition.    This patient is being managed with:   Diet NPO with Tube Feed-  Tube Feeding Modality: Nasogastric  Vital 1.5 Tank (VITAL1.5RTH)  Total Volume for 24 Hours (mL): 1560  Continuous  Starting Tube Feed Rate {mL per Hour}: 10  Increase Tube Feed Rate by (mL): 5     Every 24 hours  Until Goal Tube Feed Rate (mL per Hour): 65  Tube Feed Duration (in Hours): 24  Tube Feed Start Time: 11:45  Supplement Feeding Modality:  Nasogastric  Probiotic Yogurt/Smoothie Cans or Servings Per Day:  2       Frequency:  Daily  Entered: Aug 29 2021 12:08AM    
This patient has been assessed with a concern for Malnutrition and has been determined to have a diagnosis/diagnoses of Severe protein-calorie malnutrition.    This patient is being managed with:   Diet Pureed-  Mildly Thick Liquids (MILDTHICKLIQS)  Supplement Feeding Modality:  Oral  Ensure Enlive Cans or Servings Per Day:  3       Frequency:  Daily  Entered: Nov 8 2021  8:23AM    
This patient has been assessed with a concern for Malnutrition and has been determined to have a diagnosis/diagnoses of Moderate protein-calorie malnutrition.    This patient is being managed with:   Diet NPO with Tube Feed-  Tube Feeding Modality: Nasogastric  Jevity 1.2 Tank (JEVITY1.2RTH)  Total Volume for 24 Hours (mL): 1440  Continuous  Starting Tube Feed Rate {mL per Hour}: 10  Increase Tube Feed Rate by (mL): 10     Every 6 hours  Until Goal Tube Feed Rate (mL per Hour): 60  Tube Feed Duration (in Hours): 24  Tube Feed Start Time: 09:30  Supplement Feeding Modality:  Nasogastric  Probiotic Yogurt/Smoothie Cans or Servings Per Day:  2       Frequency:  Daily  Entered: Jul 13 2021  9:18AM    
This patient has been assessed with a concern for Malnutrition and has been determined to have a diagnosis/diagnoses of Moderate protein-calorie malnutrition.    This patient is being managed with:   Diet NPO with Tube Feed-  Tube Feeding Modality: Nasogastric  Vital AF (VITALAFRTH)  Total Volume for 24 Hours (mL): 1440  Continuous  Starting Tube Feed Rate {mL per Hour}: 10  Increase Tube Feed Rate by (mL): 10     Every 6 hours  Until Goal Tube Feed Rate (mL per Hour): 60  Tube Feed Duration (in Hours): 24  Tube Feed Start Time: 08:30  Banatrol TF     Qty per Day:  2  Supplement Feeding Modality:  Nasogastric  Probiotic Yogurt/Smoothie Cans or Servings Per Day:  2       Frequency:  Daily  Entered: Jun 29 2021  9:44AM    
This patient has been assessed with a concern for Malnutrition and has been determined to have a diagnosis/diagnoses of Moderate protein-calorie malnutrition.    This patient is being managed with:   Diet NPO with Tube Feed-  Tube Feeding Modality: Nasogastric  Vital AF (VITALAFRTH)  Total Volume for 24 Hours (mL): 1440  Continuous  Starting Tube Feed Rate {mL per Hour}: 10  Increase Tube Feed Rate by (mL): 10     Every 6 hours  Until Goal Tube Feed Rate (mL per Hour): 60  Tube Feed Duration (in Hours): 24  Tube Feed Start Time: 08:30  Supplement Feeding Modality:  Nasogastric  Probiotic Yogurt/Smoothie Cans or Servings Per Day:  2       Frequency:  Daily  Entered: Jul 9 2021 11:49AM    
This patient has been assessed with a concern for Malnutrition and has been determined to have a diagnosis/diagnoses of Severe protein-calorie malnutrition.    This patient is being managed with:   Diet NPO with Tube Feed-  Tube Feeding Modality: Nasogastric  Vital 1.5 Tank (VITAL1.5RTH)  Total Volume for 24 Hours (mL): 1560  Continuous  Starting Tube Feed Rate {mL per Hour}: 10  Increase Tube Feed Rate by (mL): 10     Every 6 hours  Until Goal Tube Feed Rate (mL per Hour): 65  Tube Feed Duration (in Hours): 24  Tube Feed Start Time: 11:45  Supplement Feeding Modality:  Nasogastric  Probiotic Yogurt/Smoothie Cans or Servings Per Day:  2       Frequency:  Daily  Entered: Aug 26 2021 11:47AM    
This patient has been assessed with a concern for Malnutrition and has been determined to have a diagnosis/diagnoses of Severe protein-calorie malnutrition.    This patient is being managed with:   Diet NPO with Tube Feed-  Tube Feeding Modality: Nasogastric  Vital 1.5 Tank (VITAL1.5RTH)  Total Volume for 24 Hours (mL): 960  Continuous  Starting Tube Feed Rate {mL per Hour}: 20  Increase Tube Feed Rate by (mL): 5     Every 6 hours  Until Goal Tube Feed Rate (mL per Hour): 40  Tube Feed Duration (in Hours): 24  Tube Feed Start Time: 10:00  Entered: Sep 29 2021 10:11AM    
This patient has been assessed with a concern for Malnutrition and has been determined to have a diagnosis/diagnoses of Moderate protein-calorie malnutrition.    This patient is being managed with:   Diet NPO with Tube Feed-  Tube Feeding Modality: Nasogastric  Jevity 1.2 Tank (JEVITY1.2RTH)  Total Volume for 24 Hours (mL): 1440  Continuous  Starting Tube Feed Rate {mL per Hour}: 10  Increase Tube Feed Rate by (mL): 10     Every 6 hours  Until Goal Tube Feed Rate (mL per Hour): 60  Tube Feed Duration (in Hours): 24  Tube Feed Start Time: 09:30  Supplement Feeding Modality:  Nasogastric  Probiotic Yogurt/Smoothie Cans or Servings Per Day:  2       Frequency:  Daily  Entered: Jul 13 2021  9:18AM    
Resume TF with Jevity 1.5 at 1/2 goal rate for now.  Increase to goal as tolerated within 12 hrs
This patient has been assessed with a concern for Malnutrition and has been determined to have a diagnosis/diagnoses of Moderate protein-calorie malnutrition.    This patient is being managed with:   Diet NPO with Tube Feed-  Tube Feeding Modality: Nasogastric  Jevity 1.2 Tank (JEVITY1.2RTH)  Total Volume for 24 Hours (mL): 1440  Continuous  Starting Tube Feed Rate {mL per Hour}: 10  Increase Tube Feed Rate by (mL): 10     Every 6 hours  Until Goal Tube Feed Rate (mL per Hour): 60  Tube Feed Duration (in Hours): 24  Tube Feed Start Time: 09:30  Supplement Feeding Modality:  Nasogastric  Probiotic Yogurt/Smoothie Cans or Servings Per Day:  2       Frequency:  Daily  Entered: Jul 13 2021  9:18AM    
This patient has been assessed with a concern for Malnutrition and has been determined to have a diagnosis/diagnoses of Moderate protein-calorie malnutrition.    This patient is being managed with:   Diet NPO with Tube Feed-  Tube Feeding Modality: Nasogastric  Vital AF (VITALAFRTH)  Total Volume for 24 Hours (mL): 1440  Continuous  Starting Tube Feed Rate {mL per Hour}: 10  Increase Tube Feed Rate by (mL): 10     Every 6 hours  Until Goal Tube Feed Rate (mL per Hour): 60  Tube Feed Duration (in Hours): 24  Tube Feed Start Time: 08:30  Supplement Feeding Modality:  Nasogastric  Probiotic Yogurt/Smoothie Cans or Servings Per Day:  2       Frequency:  Daily  Entered: Jul 9 2021 11:49AM    
This patient has been assessed with a concern for Malnutrition and has been determined to have a diagnosis/diagnoses of Severe protein-calorie malnutrition.    This patient is being managed with:   Diet NPO after Midnight-     NPO Start Date: 25-Jul-2021   NPO Start Time: 23:59  Entered: Jul 25 2021 10:58AM    Diet NPO with Tube Feed-  Tube Feeding Modality: Nasogastric  Jevity 1.2 Tank (JEVITY1.2RTH)  Total Volume for 24 Hours (mL): 1440  Continuous  Starting Tube Feed Rate {mL per Hour}: 10  Increase Tube Feed Rate by (mL): 10     Every 6 hours  Until Goal Tube Feed Rate (mL per Hour): 60  Tube Feed Duration (in Hours): 24  Tube Feed Start Time: 09:30  Supplement Feeding Modality:  Nasogastric  Probiotic Yogurt/Smoothie Cans or Servings Per Day:  2       Frequency:  Daily  Entered: Jul 13 2021  9:18AM    
This patient has been assessed with a concern for Malnutrition and has been determined to have a diagnosis/diagnoses of Severe protein-calorie malnutrition.    This patient is being managed with:   Diet NPO with Tube Feed-  Tube Feeding Modality: Nasogastric  Jevity 1.2 Tank (JEVITY1.2RTH)  Total Volume for 24 Hours (mL): 1440  Continuous  Starting Tube Feed Rate {mL per Hour}: 10  Increase Tube Feed Rate by (mL): 10     Every 6 hours  Until Goal Tube Feed Rate (mL per Hour): 60  Tube Feed Duration (in Hours): 24  Tube Feed Start Time: 11:00  No Carb Prosource TF     Qty per Day:  2  Supplement Feeding Modality:  Nasogastric  Probiotic Yogurt/Smoothie Cans or Servings Per Day:  2       Frequency:  Daily  Entered: Jul 30 2021  3:25PM    
This patient has been assessed with a concern for Malnutrition and has been determined to have a diagnosis/diagnoses of Severe protein-calorie malnutrition.    This patient is being managed with:   Diet NPO with Tube Feed-  Tube Feeding Modality: Nasogastric  Vital 1.5 Tank (VITAL1.5RTH)  Total Volume for 24 Hours (mL): 960  Continuous  Starting Tube Feed Rate {mL per Hour}: 20  Increase Tube Feed Rate by (mL): 5     Every 6 hours  Until Goal Tube Feed Rate (mL per Hour): 40  Tube Feed Duration (in Hours): 24  Tube Feed Start Time: 10:00  Entered: Sep 29 2021 10:11AM    
This patient has been assessed with a concern for Malnutrition and has been determined to have a diagnosis/diagnoses of Moderate protein-calorie malnutrition.    This patient is being managed with:   Diet NPO with Tube Feed-  Tube Feeding Modality: Nasogastric  Vital AF (VITALAFRTH)  Total Volume for 24 Hours (mL): 1440  Continuous  Starting Tube Feed Rate {mL per Hour}: 10  Increase Tube Feed Rate by (mL): 10     Every 6 hours  Until Goal Tube Feed Rate (mL per Hour): 60  Tube Feed Duration (in Hours): 24  Tube Feed Start Time: 08:30  Supplement Feeding Modality:  Nasogastric  Probiotic Yogurt/Smoothie Cans or Servings Per Day:  2       Frequency:  Daily  Entered: Jul 9 2021 11:49AM    
This patient has been assessed with a concern for Malnutrition and has been determined to have a diagnosis/diagnoses of Severe protein-calorie malnutrition.    This patient is being managed with:   Diet NPO with Tube Feed-  Tube Feeding Modality: Nasogastric  Vital 1.5 Tank (VITAL1.5RTH)  Total Volume for 24 Hours (mL): 960  Continuous  Starting Tube Feed Rate {mL per Hour}: 20  Increase Tube Feed Rate by (mL): 5     Every 6 hours  Until Goal Tube Feed Rate (mL per Hour): 40  Tube Feed Duration (in Hours): 24  Tube Feed Start Time: 10:00  Entered: Sep 29 2021 10:11AM    
This patient has been assessed with a concern for Malnutrition and has been determined to have a diagnosis/diagnoses of Severe protein-calorie malnutrition.    This patient is being managed with:   Diet NPO with Tube Feed-  Tube Feeding Modality: Nasogastric  Vital 1.5 Tank (VITAL1.5RTH)  Total Volume for 24 Hours (mL): 960  Continuous  Starting Tube Feed Rate {mL per Hour}: 20  Increase Tube Feed Rate by (mL): 5     Every 6 hours  Until Goal Tube Feed Rate (mL per Hour): 40  Tube Feed Duration (in Hours): 24  Tube Feed Start Time: 10:00  Entered: Sep 29 2021 10:11AM    
This patient has been assessed with a concern for Malnutrition and has been determined to have a diagnosis/diagnoses of Severe protein-calorie malnutrition.    This patient is being managed with:   Diet NPO with Tube Feed-  Tube Feeding Modality: Nasogastric  Vital 1.5 Tank (VITAL1.5RTH)  Total Volume for 24 Hours (mL): 960  Continuous  Starting Tube Feed Rate {mL per Hour}: 20  Increase Tube Feed Rate by (mL): 5     Every 6 hours  Until Goal Tube Feed Rate (mL per Hour): 40  Tube Feed Duration (in Hours): 24  Tube Feed Start Time: 10:00  Entered: Sep 29 2021 10:11AM      This patient has been assessed with a concern for Malnutrition and has been determined to have a diagnosis/diagnoses of Severe protein-calorie malnutrition.    This patient is being managed with:   Diet NPO with Tube Feed-  Tube Feeding Modality: Nasogastric  Vital 1.5 Tank (VITAL1.5RTH)  Total Volume for 24 Hours (mL): 960  Continuous  Starting Tube Feed Rate {mL per Hour}: 20  Increase Tube Feed Rate by (mL): 5     Every 6 hours  Until Goal Tube Feed Rate (mL per Hour): 40  Tube Feed Duration (in Hours): 24  Tube Feed Start Time: 10:00  Entered: Sep 29 2021 10:11AM    
This patient has been assessed with a concern for Malnutrition and has been determined to have a diagnosis/diagnoses of Moderate protein-calorie malnutrition.    This patient is being managed with:   Diet NPO with Tube Feed-  Tube Feeding Modality: Nasogastric  Jevity 1.2 Tank (JEVITY1.2RTH)  Total Volume for 24 Hours (mL): 1440  Continuous  Starting Tube Feed Rate {mL per Hour}: 10  Increase Tube Feed Rate by (mL): 10     Every 6 hours  Until Goal Tube Feed Rate (mL per Hour): 60  Tube Feed Duration (in Hours): 24  Tube Feed Start Time: 09:30  Supplement Feeding Modality:  Nasogastric  Probiotic Yogurt/Smoothie Cans or Servings Per Day:  2       Frequency:  Daily  Entered: Jul 13 2021  9:18AM    
This patient has been assessed with a concern for Malnutrition and has been determined to have a diagnosis/diagnoses of Severe protein-calorie malnutrition.    This patient is being managed with:   Diet NPO with Tube Feed-  Tube Feeding Modality: Nasogastric  Vital 1.5 Tank (VITAL1.5RTH)  Total Volume for 24 Hours (mL): 960  Continuous  Starting Tube Feed Rate {mL per Hour}: 20  Increase Tube Feed Rate by (mL): 5     Every 6 hours  Until Goal Tube Feed Rate (mL per Hour): 40  Tube Feed Duration (in Hours): 24  Tube Feed Start Time: 10:00  Entered: Sep 29 2021 10:11AM    
This patient has been assessed with a concern for Malnutrition and has been determined to have a diagnosis/diagnoses of Moderate protein-calorie malnutrition.    This patient is being managed with:   Diet NPO with Tube Feed-  Tube Feeding Modality: Nasogastric  Jevity 1.2 Tank (JEVITY1.2RTH)  Total Volume for 24 Hours (mL): 1440  Continuous  Starting Tube Feed Rate {mL per Hour}: 10  Increase Tube Feed Rate by (mL): 10     Every 6 hours  Until Goal Tube Feed Rate (mL per Hour): 60  Tube Feed Duration (in Hours): 24  Tube Feed Start Time: 09:30  Supplement Feeding Modality:  Nasogastric  Probiotic Yogurt/Smoothie Cans or Servings Per Day:  2       Frequency:  Daily  Entered: Jul 13 2021  9:18AM    
This patient has been assessed with a concern for Malnutrition and has been determined to have a diagnosis/diagnoses of Moderate protein-calorie malnutrition.    This patient is being managed with:   Diet NPO with Tube Feed-  Tube Feeding Modality: Nasogastric  Jevity 1.2 Tank (JEVITY1.2RTH)  Total Volume for 24 Hours (mL): 1440  Continuous  Starting Tube Feed Rate {mL per Hour}: 10  Increase Tube Feed Rate by (mL): 10     Every 6 hours  Until Goal Tube Feed Rate (mL per Hour): 60  Tube Feed Duration (in Hours): 24  Tube Feed Start Time: 09:30  Supplement Feeding Modality:  Nasogastric  Probiotic Yogurt/Smoothie Cans or Servings Per Day:  2       Frequency:  Daily  Entered: Jul 13 2021  9:18AM    
This patient has been assessed with a concern for Malnutrition and has been determined to have a diagnosis/diagnoses of Severe protein-calorie malnutrition.    This patient is being managed with:   Diet Easy to Chew-  Supplement Feeding Modality:  Oral  Ensure Enlive Cans or Servings Per Day:  2       Frequency:  Daily  Entered: Nov 12 2021  2:28PM    
This patient has been assessed with a concern for Malnutrition and has been determined to have a diagnosis/diagnoses of Severe protein-calorie malnutrition.    This patient is being managed with:   Diet NPO with Tube Feed-  Tube Feeding Modality: Nasogastric  Vital 1.5 Tank (VITAL1.5RTH)  Total Volume for 24 Hours (mL): 960  Continuous  Starting Tube Feed Rate {mL per Hour}: 20  Increase Tube Feed Rate by (mL): 5     Every 6 hours  Until Goal Tube Feed Rate (mL per Hour): 40  Tube Feed Duration (in Hours): 24  Tube Feed Start Time: 10:00  Entered: Sep 29 2021 10:11AM    
This patient has been assessed with a concern for Malnutrition and has been determined to have a diagnosis/diagnoses of Moderate protein-calorie malnutrition.    This patient is being managed with:   Diet NPO with Tube Feed-  Tube Feeding Modality: Nasogastric  Jevity 1.2 Tank (JEVITY1.2RTH)  Total Volume for 24 Hours (mL): 1440  Continuous  Starting Tube Feed Rate {mL per Hour}: 10  Increase Tube Feed Rate by (mL): 10     Every 6 hours  Until Goal Tube Feed Rate (mL per Hour): 60  Tube Feed Duration (in Hours): 24  Tube Feed Start Time: 09:30  Supplement Feeding Modality:  Nasogastric  Probiotic Yogurt/Smoothie Cans or Servings Per Day:  2       Frequency:  Daily  Entered: Jul 13 2021  9:18AM    
This patient has been assessed with a concern for Malnutrition and has been determined to have a diagnosis/diagnoses of Moderate protein-calorie malnutrition.    This patient is being managed with:   Diet NPO with Tube Feed-  Tube Feeding Modality: Nasogastric  Vital AF (VITALAFRTH)  Total Volume for 24 Hours (mL): 1440  Continuous  Starting Tube Feed Rate {mL per Hour}: 10  Increase Tube Feed Rate by (mL): 10     Every 6 hours  Until Goal Tube Feed Rate (mL per Hour): 60  Tube Feed Duration (in Hours): 24  Tube Feed Start Time: 08:30  Supplement Feeding Modality:  Nasogastric  Probiotic Yogurt/Smoothie Cans or Servings Per Day:  2       Frequency:  Daily  Entered: Jul 9 2021 11:49AM    
This patient has been assessed with a concern for Malnutrition and has been determined to have a diagnosis/diagnoses of Moderate protein-calorie malnutrition.    This patient is being managed with:   Diet NPO with Tube Feed-  Tube Feeding Modality: Nasogastric  Jevity 1.2 Tank (JEVITY1.2RTH)  Total Volume for 24 Hours (mL): 1440  Continuous  Starting Tube Feed Rate {mL per Hour}: 10  Increase Tube Feed Rate by (mL): 10     Every 6 hours  Until Goal Tube Feed Rate (mL per Hour): 60  Tube Feed Duration (in Hours): 24  Tube Feed Start Time: 09:30  Supplement Feeding Modality:  Nasogastric  Probiotic Yogurt/Smoothie Cans or Servings Per Day:  2       Frequency:  Daily  Entered: Jul 13 2021  9:18AM    
This patient has been assessed with a concern for Malnutrition and has been determined to have a diagnosis/diagnoses of Severe protein-calorie malnutrition.    This patient is being managed with:   Diet Easy to Chew-  Supplement Feeding Modality:  Oral  Ensure Enlive Cans or Servings Per Day:  2       Frequency:  Daily  Entered: Nov 12 2021  2:28PM    
This patient has been assessed with a concern for Malnutrition and has been determined to have a diagnosis/diagnoses of Severe protein-calorie malnutrition.    This patient is being managed with:   Diet Pureed-  Mildly Thick Liquids (MILDTHICKLIQS)  Tube Feeding Modality: Nasogastric  Vital 1.5 Tank (VITAL1.5RTH)  Total Volume for 24 Hours (mL): 1200  Continuous  Starting Tube Feed Rate {mL per Hour}: 50  Until Goal Tube Feed Rate (mL per Hour): 50  Tube Feed Duration (in Hours): 24  Tube Feed Start Time: 15:40  Supplement Feeding Modality:  Oral  Ensure Enlive Cans or Servings Per Day:  3       Frequency:  Daily  Entered: Nov 4 2021  9:29AM    
This patient has been assessed with a concern for Malnutrition and has been determined to have a diagnosis/diagnoses of Moderate protein-calorie malnutrition.    This patient is being managed with:   Diet NPO after Midnight-     NPO Start Date: 25-Jul-2021   NPO Start Time: 23:59  Entered: Jul 25 2021 10:58AM    Diet NPO with Tube Feed-  Tube Feeding Modality: Nasogastric  Jevity 1.2 Tank (JEVITY1.2RTH)  Total Volume for 24 Hours (mL): 1440  Continuous  Starting Tube Feed Rate {mL per Hour}: 10  Increase Tube Feed Rate by (mL): 10     Every 6 hours  Until Goal Tube Feed Rate (mL per Hour): 60  Tube Feed Duration (in Hours): 24  Tube Feed Start Time: 09:30  Supplement Feeding Modality:  Nasogastric  Probiotic Yogurt/Smoothie Cans or Servings Per Day:  2       Frequency:  Daily  Entered: Jul 13 2021  9:18AM    
This patient has been assessed with a concern for Malnutrition and has been determined to have a diagnosis/diagnoses of Moderate protein-calorie malnutrition.    This patient is being managed with:   Diet NPO with Tube Feed-  Tube Feeding Modality: Nasogastric  Jevity 1.2 Tank (JEVITY1.2RTH)  Total Volume for 24 Hours (mL): 1440  Continuous  Starting Tube Feed Rate {mL per Hour}: 10  Increase Tube Feed Rate by (mL): 10     Every 6 hours  Until Goal Tube Feed Rate (mL per Hour): 60  Tube Feed Duration (in Hours): 24  Tube Feed Start Time: 09:30  Supplement Feeding Modality:  Nasogastric  Probiotic Yogurt/Smoothie Cans or Servings Per Day:  2       Frequency:  Daily  Entered: Jul 13 2021  9:18AM    
This patient has been assessed with a concern for Malnutrition and has been determined to have a diagnosis/diagnoses of Moderate protein-calorie malnutrition.    This patient is being managed with:   Diet NPO with Tube Feed-  Tube Feeding Modality: Nasogastric  Jevity 1.2 Tank (JEVITY1.2RTH)  Total Volume for 24 Hours (mL): 1440  Continuous  Starting Tube Feed Rate {mL per Hour}: 10  Increase Tube Feed Rate by (mL): 10     Every 6 hours  Until Goal Tube Feed Rate (mL per Hour): 60  Tube Feed Duration (in Hours): 24  Tube Feed Start Time: 09:30  Supplement Feeding Modality:  Nasogastric  Probiotic Yogurt/Smoothie Cans or Servings Per Day:  2       Frequency:  Daily  Entered: Jul 13 2021  9:18AM    
This patient has been assessed with a concern for Malnutrition and has been determined to have a diagnosis/diagnoses of Moderate protein-calorie malnutrition.    This patient is being managed with:   Diet NPO with Tube Feed-  Tube Feeding Modality: Nasogastric  Vital AF (VITALAFRTH)  Total Volume for 24 Hours (mL): 1440  Continuous  Starting Tube Feed Rate {mL per Hour}: 10  Increase Tube Feed Rate by (mL): 10     Every 6 hours  Until Goal Tube Feed Rate (mL per Hour): 60  Tube Feed Duration (in Hours): 24  Tube Feed Start Time: 08:30  Supplement Feeding Modality:  Nasogastric  Probiotic Yogurt/Smoothie Cans or Servings Per Day:  2       Frequency:  Daily  Entered: Jul 9 2021 11:49AM    
This patient has been assessed with a concern for Malnutrition and has been determined to have a diagnosis/diagnoses of Severe protein-calorie malnutrition.    This patient is being managed with:   Diet Pureed-  Mildly Thick Liquids (MILDTHICKLIQS)  Supplement Feeding Modality:  Oral  Ensure Enlive Cans or Servings Per Day:  3       Frequency:  Daily  Entered: Nov 8 2021  8:23AM    
This patient has been assessed with a concern for Malnutrition and has been determined to have a diagnosis/diagnoses of Severe protein-calorie malnutrition.    This patient is being managed with:   Diet Pureed-  Mildly Thick Liquids (MILDTHICKLIQS)  Supplement Feeding Modality:  Oral  Ensure Enlive Cans or Servings Per Day:  3       Frequency:  Daily  Entered: Nov 8 2021  8:23AM    
This patient has been assessed with a concern for Malnutrition and has been determined to have a diagnosis/diagnoses of Severe protein-calorie malnutrition.    This patient is being managed with:   Diet Pureed-  Mildly Thick Liquids (MILDTHICKLIQS)  Tube Feeding Modality: Nasogastric  Vital 1.5 Tank (VITAL1.5RTH)  Total Volume for 24 Hours (mL): 1200  Continuous  Starting Tube Feed Rate {mL per Hour}: 50  Until Goal Tube Feed Rate (mL per Hour): 50  Tube Feed Duration (in Hours): 24  Tube Feed Start Time: 15:40  Supplement Feeding Modality:  Oral  Ensure Enlive Cans or Servings Per Day:  3       Frequency:  Daily  Entered: Nov 4 2021  9:29AM    
This patient has been assessed with a concern for Malnutrition and has been determined to have a diagnosis/diagnoses of Severe protein-calorie malnutrition.    This patient is being managed with:   Diet Pureed-  Mildly Thick Liquids (MILDTHICKLIQS)  Tube Feeding Modality: Nasogastric  Vital 1.5 Tank (VITAL1.5RTH)  Total Volume for 24 Hours (mL): 1200  Continuous  Starting Tube Feed Rate {mL per Hour}: 50  Until Goal Tube Feed Rate (mL per Hour): 50  Tube Feed Duration (in Hours): 24  Tube Feed Start Time: 15:40  Supplement Feeding Modality:  Oral  Ensure Enlive Cans or Servings Per Day:  3       Frequency:  Daily  Entered: Nov 4 2021  9:29AM    
This patient has been assessed with a concern for Malnutrition and has been determined to have a diagnosis/diagnoses of Severe protein-calorie malnutrition.    This patient is being managed with:   Diet Pureed-  Supplement Feeding Modality:  Oral  Ensure Enlive Cans or Servings Per Day:  3       Frequency:  Daily  Entered: Nov 11 2021 12:50PM    
This patient has been assessed with a concern for Malnutrition and has been determined to have a diagnosis/diagnoses of Severe protein-calorie malnutrition.    This patient is being managed with:   Diet Pureed-  Supplement Feeding Modality:  Oral  Ensure Enlive Cans or Servings Per Day:  3       Frequency:  Daily  Entered: Nov 11 2021 12:50PM    
This patient has been assessed with a concern for Malnutrition and has been determined to have a diagnosis/diagnoses of Moderate protein-calorie malnutrition.    This patient is being managed with:   Diet NPO with Tube Feed-  Tube Feeding Modality: Nasogastric  Jevity 1.2 Tank (JEVITY1.2RTH)  Total Volume for 24 Hours (mL): 1440  Continuous  Starting Tube Feed Rate {mL per Hour}: 10  Increase Tube Feed Rate by (mL): 10     Every 6 hours  Until Goal Tube Feed Rate (mL per Hour): 60  Tube Feed Duration (in Hours): 24  Tube Feed Start Time: 09:30  Supplement Feeding Modality:  Nasogastric  Probiotic Yogurt/Smoothie Cans or Servings Per Day:  2       Frequency:  Daily  Entered: Jul 13 2021  9:18AM    
This patient has been assessed with a concern for Malnutrition and has been determined to have a diagnosis/diagnoses of Moderate protein-calorie malnutrition.    This patient is being managed with:   Diet NPO with Tube Feed-  Tube Feeding Modality: Nasogastric  Vital AF (VITALAFRTH)  Total Volume for 24 Hours (mL): 1440  Continuous  Starting Tube Feed Rate {mL per Hour}: 10  Increase Tube Feed Rate by (mL): 10     Every 6 hours  Until Goal Tube Feed Rate (mL per Hour): 60  Tube Feed Duration (in Hours): 24  Tube Feed Start Time: 08:30  Supplement Feeding Modality:  Nasogastric  Probiotic Yogurt/Smoothie Cans or Servings Per Day:  2       Frequency:  Daily  Entered: Jul 9 2021 11:49AM    
This patient has been assessed with a concern for Malnutrition and has been determined to have a diagnosis/diagnoses of Moderate protein-calorie malnutrition.    This patient is being managed with:   Diet NPO with Tube Feed-  Tube Feeding Modality: Nasogastric  Jevity 1.2 Tank (JEVITY1.2RTH)  Total Volume for 24 Hours (mL): 1440  Continuous  Starting Tube Feed Rate {mL per Hour}: 10  Increase Tube Feed Rate by (mL): 10     Every 6 hours  Until Goal Tube Feed Rate (mL per Hour): 60  Tube Feed Duration (in Hours): 24  Tube Feed Start Time: 09:30  Supplement Feeding Modality:  Nasogastric  Probiotic Yogurt/Smoothie Cans or Servings Per Day:  2       Frequency:  Daily  Entered: Jul 13 2021  9:18AM    
This patient has been assessed with a concern for Malnutrition and has been determined to have a diagnosis/diagnoses of Moderate protein-calorie malnutrition.    This patient is being managed with:   Diet NPO with Tube Feed-  Tube Feeding Modality: Nasogastric  Jevity 1.2 Tank (JEVITY1.2RTH)  Total Volume for 24 Hours (mL): 1440  Continuous  Starting Tube Feed Rate {mL per Hour}: 10  Increase Tube Feed Rate by (mL): 10     Every 6 hours  Until Goal Tube Feed Rate (mL per Hour): 60  Tube Feed Duration (in Hours): 24  Tube Feed Start Time: 09:30  Supplement Feeding Modality:  Nasogastric  Probiotic Yogurt/Smoothie Cans or Servings Per Day:  2       Frequency:  Daily  Entered: Jul 13 2021  9:18AM    
This patient has been assessed with a concern for Malnutrition and has been determined to have a diagnosis/diagnoses of Moderate protein-calorie malnutrition.    This patient is being managed with:   Diet NPO with Tube Feed-  Tube Feeding Modality: Nasogastric  Vital AF (VITALAFRTH)  Total Volume for 24 Hours (mL): 1440  Continuous  Starting Tube Feed Rate {mL per Hour}: 10  Increase Tube Feed Rate by (mL): 10     Every 6 hours  Until Goal Tube Feed Rate (mL per Hour): 60  Tube Feed Duration (in Hours): 24  Tube Feed Start Time: 08:30  Supplement Feeding Modality:  Nasogastric  Probiotic Yogurt/Smoothie Cans or Servings Per Day:  2       Frequency:  Daily  Entered: Jul 9 2021 11:49AM    
This patient has been assessed with a concern for Malnutrition and has been determined to have a diagnosis/diagnoses of Moderate protein-calorie malnutrition.    This patient is being managed with:   Diet NPO with Tube Feed-  Tube Feeding Modality: Nasogastric  Jevity 1.2 Tank (JEVITY1.2RTH)  Total Volume for 24 Hours (mL): 1440  Continuous  Starting Tube Feed Rate {mL per Hour}: 10  Increase Tube Feed Rate by (mL): 10     Every 6 hours  Until Goal Tube Feed Rate (mL per Hour): 60  Tube Feed Duration (in Hours): 24  Tube Feed Start Time: 09:30  Supplement Feeding Modality:  Nasogastric  Probiotic Yogurt/Smoothie Cans or Servings Per Day:  2       Frequency:  Daily  Entered: Jul 13 2021  9:18AM    
This patient has been assessed with a concern for Malnutrition and has been determined to have a diagnosis/diagnoses of Moderate protein-calorie malnutrition.    This patient is being managed with:   Diet NPO with Tube Feed-  Tube Feeding Modality: Nasogastric  Vital AF (VITALAFRTH)  Total Volume for 24 Hours (mL): 1440  Continuous  Starting Tube Feed Rate {mL per Hour}: 10  Increase Tube Feed Rate by (mL): 10     Every 6 hours  Until Goal Tube Feed Rate (mL per Hour): 60  Tube Feed Duration (in Hours): 24  Tube Feed Start Time: 08:30  Supplement Feeding Modality:  Nasogastric  Probiotic Yogurt/Smoothie Cans or Servings Per Day:  2       Frequency:  Daily  Entered: Jul 9 2021 11:49AM    
This patient has been assessed with a concern for Malnutrition and has been determined to have a diagnosis/diagnoses of Moderate protein-calorie malnutrition.    This patient is being managed with:   Diet NPO with Tube Feed-  Tube Feeding Modality: Nasogastric  Vital AF (VITALAFRTH)  Total Volume for 24 Hours (mL): 1440  Continuous  Starting Tube Feed Rate {mL per Hour}: 10  Increase Tube Feed Rate by (mL): 10     Every 6 hours  Until Goal Tube Feed Rate (mL per Hour): 60  Tube Feed Duration (in Hours): 24  Tube Feed Start Time: 08:30  Supplement Feeding Modality:  Nasogastric  Probiotic Yogurt/Smoothie Cans or Servings Per Day:  2       Frequency:  Daily  Entered: Jul 9 2021 11:49AM    
This patient has been assessed with a concern for Malnutrition and has been determined to have a diagnosis/diagnoses of Moderate protein-calorie malnutrition.    This patient is being managed with:   Diet NPO with Tube Feed-  Tube Feeding Modality: Nasogastric  Jevity 1.2 Tank (JEVITY1.2RTH)  Total Volume for 24 Hours (mL): 1440  Continuous  Starting Tube Feed Rate {mL per Hour}: 10  Increase Tube Feed Rate by (mL): 10     Every 6 hours  Until Goal Tube Feed Rate (mL per Hour): 60  Tube Feed Duration (in Hours): 24  Tube Feed Start Time: 09:30  Supplement Feeding Modality:  Nasogastric  Probiotic Yogurt/Smoothie Cans or Servings Per Day:  2       Frequency:  Daily  Entered: Jul 13 2021  9:18AM    
This patient has been assessed with a concern for Malnutrition and has been determined to have a diagnosis/diagnoses of Moderate protein-calorie malnutrition.    This patient is being managed with:   Diet NPO with Tube Feed-  Tube Feeding Modality: Nasogastric  Jevity 1.2 Tank (JEVITY1.2RTH)  Total Volume for 24 Hours (mL): 1440  Continuous  Starting Tube Feed Rate {mL per Hour}: 10  Increase Tube Feed Rate by (mL): 10     Every 6 hours  Until Goal Tube Feed Rate (mL per Hour): 60  Tube Feed Duration (in Hours): 24  Tube Feed Start Time: 09:30  Supplement Feeding Modality:  Nasogastric  Probiotic Yogurt/Smoothie Cans or Servings Per Day:  2       Frequency:  Daily  Entered: Jul 13 2021  9:18AM    
This patient has been assessed with a concern for Malnutrition and has been determined to have a diagnosis/diagnoses of Moderate protein-calorie malnutrition.    This patient is being managed with:   Diet NPO with Tube Feed-  Tube Feeding Modality: Nasogastric  Vital AF (VITALAFRTH)  Total Volume for 24 Hours (mL): 1440  Continuous  Starting Tube Feed Rate {mL per Hour}: 10  Increase Tube Feed Rate by (mL): 10     Every 6 hours  Until Goal Tube Feed Rate (mL per Hour): 60  Tube Feed Duration (in Hours): 24  Tube Feed Start Time: 08:30  Banatrol TF     Qty per Day:  2  Supplement Feeding Modality:  Nasogastric  Probiotic Yogurt/Smoothie Cans or Servings Per Day:  2       Frequency:  Daily  Entered: Jun 29 2021  9:44AM    
This patient has been assessed with a concern for Malnutrition and has been determined to have a diagnosis/diagnoses of Moderate protein-calorie malnutrition.    This patient is being managed with:   Diet NPO with Tube Feed-  Tube Feeding Modality: Nasogastric  Vital AF (VITALAFRTH)  Total Volume for 24 Hours (mL): 1440  Continuous  Starting Tube Feed Rate {mL per Hour}: 10  Increase Tube Feed Rate by (mL): 10     Every 6 hours  Until Goal Tube Feed Rate (mL per Hour): 60  Tube Feed Duration (in Hours): 24  Tube Feed Start Time: 08:30  Banatrol TF     Qty per Day:  2  Supplement Feeding Modality:  Nasogastric  Probiotic Yogurt/Smoothie Cans or Servings Per Day:  2       Frequency:  Daily  Entered: Jun 29 2021  9:44AM    
This patient has been assessed with a concern for Malnutrition and has been determined to have a diagnosis/diagnoses of Moderate protein-calorie malnutrition.    This patient is being managed with:   Diet NPO with Tube Feed-  Tube Feeding Modality: Nasogastric  Jevity 1.2 Tank (JEVITY1.2RTH)  Total Volume for 24 Hours (mL): 1440  Continuous  Starting Tube Feed Rate {mL per Hour}: 10  Increase Tube Feed Rate by (mL): 10     Every 6 hours  Until Goal Tube Feed Rate (mL per Hour): 60  Tube Feed Duration (in Hours): 24  Tube Feed Start Time: 09:30  Supplement Feeding Modality:  Nasogastric  Probiotic Yogurt/Smoothie Cans or Servings Per Day:  2       Frequency:  Daily  Entered: Jul 13 2021  9:18AM    
This patient has been assessed with a concern for Malnutrition and has been determined to have a diagnosis/diagnoses of Moderate protein-calorie malnutrition.    This patient is being managed with:   Diet NPO with Tube Feed-  Tube Feeding Modality: Nasogastric  Vital AF (VITALAFRTH)  Total Volume for 24 Hours (mL): 1440  Continuous  Starting Tube Feed Rate {mL per Hour}: 10  Increase Tube Feed Rate by (mL): 10     Every 6 hours  Until Goal Tube Feed Rate (mL per Hour): 60  Tube Feed Duration (in Hours): 24  Tube Feed Start Time: 08:30  Supplement Feeding Modality:  Nasogastric  Probiotic Yogurt/Smoothie Cans or Servings Per Day:  2       Frequency:  Daily  Entered: Jul 9 2021 11:49AM    
This patient has been assessed with a concern for Malnutrition and has been determined to have a diagnosis/diagnoses of Moderate protein-calorie malnutrition.    This patient is being managed with:   Diet NPO with Tube Feed-  Tube Feeding Modality: Nasogastric  Jevity 1.2 Tank (JEVITY1.2RTH)  Total Volume for 24 Hours (mL): 1440  Continuous  Starting Tube Feed Rate {mL per Hour}: 10  Increase Tube Feed Rate by (mL): 10     Every 6 hours  Until Goal Tube Feed Rate (mL per Hour): 60  Tube Feed Duration (in Hours): 24  Tube Feed Start Time: 09:30  Supplement Feeding Modality:  Nasogastric  Probiotic Yogurt/Smoothie Cans or Servings Per Day:  2       Frequency:  Daily  Entered: Jul 13 2021  9:18AM    

## 2021-11-17 NOTE — PROGRESS NOTE ADULT - PROVIDER SPECIALTY LIST ADULT
Critical Care
ENT
Endocrinology
Gastroenterology
Gastroenterology
Heart Failure
Heart Failure
Infectious Disease
Nutrition Support
Nutrition Support
Psychology
Pulmonology
Surgery
Transplant ID
Vascular Surgery
CCU
CT Surgery
CTU
Critical Care
ENT
Endocrinology
Gastroenterology
Heart Failure
Infectious Disease
Intervent Radiology
Nephrology
Nutrition Support
Psychology
Psychology
Pulmonology
Transplant ID
Vascular Surgery
CT Surgery
CT Surgery
Critical Care
ENT
Endocrinology
Gastroenterology
Heart Failure
Infectious Disease
Intervent Radiology
Nephrology
Nephrology
Nutrition Support
Psychology
Pulmonology
Transplant ID
Transplant ID
Vascular Surgery
Anesthesia
CT Surgery
Critical Care
ENT
Endocrinology
Endocrinology
Gastroenterology
Heart Failure
Heart Failure
Infectious Disease
Nephrology
Nutrition Support
Psychology
Pulmonology
Surgery
Transplant ID
Vascular Surgery
CT Surgery
CT Surgery
ENT
Endocrinology
Endocrinology
Gastroenterology
Gastroenterology
Heart Failure
Infectious Disease
Infectious Disease
Nutrition Support
Pulmonology
Transplant ID
Vascular Surgery
Critical Care
ENT
Endocrinology
Endocrinology
Heart Failure
Infectious Disease
Pulmonology
Transplant ID
Critical Care
Critical Care
ENT
Endocrinology
Heart Failure
Infectious Disease
Critical Care
Critical Care
ENT
Endocrinology
Heart Failure
CT Surgery
ENT
Endocrinology
Endocrinology
Heart Failure
Endocrinology
Endocrinology
Heart Failure
Infectious Disease
CT Surgery
CT Surgery
Endocrinology
Heart Failure
Palliative Care
CT Surgery
Endocrinology
Endocrinology
Heart Failure
CT Surgery
Heart Failure
CT Surgery
Heart Failure
CT Surgery
Endocrinology
Endocrinology
Heart Failure
CT Surgery
Heart Failure
Heart Failure
Infectious Disease
CT Surgery
CT Surgery
Endocrinology
Endocrinology
Heart Failure
Infectious Disease
Endocrinology
Heart Failure
Palliative Care
Heart Failure
Infectious Disease
Heart Failure
Infectious Disease
Heart Failure

## 2021-11-17 NOTE — DISCHARGE NOTE NURSING/CASE MANAGEMENT/SOCIAL WORK - PATIENT PORTAL LINK FT
You can access the FollowMyHealth Patient Portal offered by [facility] by registering at the following website: [email or web address]. By joining [facility]’s FollowMyHealth portal, you will also be able to view your health information using other applications (apps) compatible with our system.

## 2021-11-19 NOTE — PROGRESS NOTE ADULT - PROBLEM SELECTOR PLAN 8
Alert-The patient is alert, awake and responds to voice. The patient is oriented to time, place, and person. The triage nurse is able to obtain subjective information. - now improved, likely medication induced  - Daily CBC.

## 2021-12-01 PROBLEM — R06.02 SOB (SHORTNESS OF BREATH): Status: ACTIVE | Noted: 2021-01-01

## 2021-12-01 PROBLEM — U07.1 COVID-19 VIRUS INFECTION: Status: ACTIVE | Noted: 2020-06-04

## 2021-12-01 PROBLEM — J96.10 CHRONIC RESPIRATORY FAILURE: Status: ACTIVE | Noted: 2021-01-01

## 2021-12-01 PROBLEM — Z01.811 PREOP PULMONARY/RESPIRATORY EXAM: Status: ACTIVE | Noted: 2021-01-01

## 2021-12-01 PROBLEM — Z95.811 LVAD (LEFT VENTRICULAR ASSIST DEVICE) PRESENT: Status: ACTIVE | Noted: 2017-11-02

## 2021-12-01 PROBLEM — Z93.0 TRACHEOSTOMY IN PLACE: Status: ACTIVE | Noted: 2021-01-01

## 2021-12-01 NOTE — PROCEDURE
[FreeTextEntry1] : CXR revealed moderate cardiomegaly. LVAD in place. Small pleural effusion\par \par -Images and procedures reviewed in detail and discussed with patient.

## 2021-12-01 NOTE — REASON FOR VISIT
[Initial] : an initial visit [TextBox_44] : pre-op pulmonary clearance for red capping trials and potential decannulation

## 2021-12-01 NOTE — PHYSICAL EXAM
[No Acute Distress] : no acute distress [Normal Oropharynx] : normal oropharynx [III] : Mallampati Class: III [Normal Appearance] : normal appearance [No Neck Mass] : no neck mass [Normal Rate/Rhythm] : normal rate/rhythm [Normal S1, S2] : normal s1, s2 [No Murmurs] : no murmurs [No Resp Distress] : no resp distress [Clear to Auscultation Bilaterally] : clear to auscultation bilaterally [No Abnormalities] : no abnormalities [Benign] : benign [Normal Gait] : normal gait [No Clubbing] : no clubbing [No Cyanosis] : no cyanosis [FROM] : FROM [Normal Color/ Pigmentation] : normal color/ pigmentation [No Focal Deficits] : no focal deficits [Oriented x3] : oriented x3 [Normal Affect] : normal affect [TextBox_2] : wheelchair [TextBox_44] : trach in place [TextBox_54] : LVAD in place [TextBox_68] : I:E 1:3, inspiratory crackles at the bases [TextBox_105] : trace edema LE

## 2021-12-01 NOTE — HISTORY OF PRESENT ILLNESS
[TextBox_4] : Mr. HAMPTON is a 66 year male with a history of hypertensive heart disease, cardiomyopathy, SVT, s/p LVAD placement, PNA, acute respiratory failure s/p tracheostomy, prior gastroparesis, hemoperitoneum, CKD, BPH, GERD, sepsis due to serratia, who now comes in for an initial pulmonary evaluation. His chief complaint is\par -he notes feeling good in general\par -he denies wheezing or coughing\par -he denies being SOB\par -he notes he has been walking\par -he notes his energy level is good\par -he notes he has been eating and denies getting tube feeds\par -he notes his bowels are regular \par -he notes his weight is stable \par -he notes sleeping well\par -he denies snoring\par -he notes his sinuses are normal but the cold air can make them worse\par -he denies any visual issues \par -he notes his voice is good and is getting stronger\par -he notes seeing Dr. Chandler and Zeyad\par -he notes prior to this, he never had any breathing issues\par \par -patient denies any headaches, nausea, vomiting, fever, chills, sweats, chest pain, chest pressure, palpitations, coughing, wheezing, fatigue, diarrhea, constipation, dysphagia, myalgias, dizziness, leg swelling, leg pain, itchy eyes, itchy ears, heartburn, reflux or sour taste in the mouth

## 2021-12-01 NOTE — ADDENDUM
[FreeTextEntry1] : Documented by Bertrand Avilez acting as a scribe for Dr. Timothy Mujica on 12/01/2021\par \par All medical record entries made by the scribe were at my, Dr. Timothy Mujica's, direction and personally dictated by me on 12/01/2021. I have reviewed the chart and agree that the record accurately reflects my personal performance of the history, physical exam, assessment, and plan. I have also personally directed, reviewed, and agree with the discharge instructions.

## 2021-12-01 NOTE — ASSESSMENT
[FreeTextEntry1] : Mr. HAMPTON is a 66 year male with a history of hypertensive heart disease, cardiomyopathy, SVT, s/p LVAD placement, PNA, acute respiratory failure s/p tracheostomy, prior gastroparesis, hemoperitoneum, CKD, BPH, GERD, sepsis due to serratia, complicated hospitalization, who now comes in for an initial pulmonary evaluation for pre-op pulmonary clearance for red capping trials and potential decannulation \par \par \par The patient's SOB is felt to be multifactorial:\par -poor mechanics of breathing\par -out of shape\par -Pulmonary\par    -restrictive dysfunction due to prior lung surgery and current LVAD in place\par -Cardiac\par    -hypertensive cardiomyopathy \par \par Problem 1: Restrictive Dysfunction due to prior lung surgery and current LVAD in place\par -Does not appear to be any significant parenchymal disease\par -Complete dedicated CT Chest non contrast\par -Complete Full PFTs following decannulation\par \par Problem 2: GERD\par -Add Protonix 40 mg QAM, pre-breakfast with aspiration precautions\par - Rule of 2s: avoid eating too much, eating too late, eating too spicy, eating two hours before bed.\par - Things to avoid including overeating, spicy foods, tight clothing, eating within two hours of bed, this list is not all inclusive.\par - For treatments of reflux, possible options discussed including diet control, H2 blockers, PPIs, as well as coating motility agents discussed as treatment options. Timing of meals and proximity of last meal to sleep were discussed. If symptoms persist, a formal gastrointestinal evaluation is needed. \par \par Problem 3: Tracheostomy in place\par -at this point in time there are no absolute pulmonary contraindications to go forward with the planned red capping trials and decannulation with otolaryngology/ENT assuming vocal chords are fine which they appear to be based on patient's ability to phonate\par -at the time of surgery s/he should have optimal pain control, incentive spirometry, early ambulation, DVT and GI prophylaxis. \par \par Problem 4: COVID-19\par -he denies getting the COVID vaccine\par -COVID-19 precautionary Immune Support Recommendations:\par -OTC Vitamin C 500mg BID \par -OTC Quercetin 250-500mg BID \par -OTC Zinc 75-100mg per day \par -OTC Melatonin 1 or 2mg a night \par -OTC Vitamin D 1-4000mg per day \par -OTC Tonic Water 8oz per day\par -Water 0.5-1 gallon per day \par \par \par Problem 5: Cardiac\par -Recommend cardiac follow up evaluation with cardiologist if needed (Dr. Tamanna Mcclendon)\par \par Problem 6: out of shape\par - Weight loss, exercise and diet control were discussed and are highly encouraged. Treatment options were given such as aqua therapy, and contacting a nutritionist. Recommended to use the elliptical, stationary bike, less use of treadmill. Mindful eating was explained to the patient. Obesity is associated with worsening asthma, SOB, and potential for cardiac disease, diabetes, and other underlying medical conditions.\par \par Problem 7: Poor mechanics of breathing\par - Proper breathing techniques were reviewed with an emphasis on exhalation. Patient instructed to breath in for 1 second and out for four seconds. Patient was encouraged not to talk while walking.\par \par Problem 8: Health Maintenance\par -Can return to [facility]\par -s/p flu shot\par -recommended strep pneumonia vaccines: Prevnar-13 vaccine, follow by Pneumo vaccine 23 one year following\par -recommended early intervention for URIs\par -recommended regular osteoporosis evaluations\par -recommended early dermatological evaluations\par -recommended after the age of 50 to the age of 70, colonoscopy every 5 years\par \par f/u in 6-8 weeks\par pt is encouraged to call or fax the office with any questions or concerns.

## 2021-12-02 NOTE — H&P ADULT - NSHPSOCIALHISTORY_GEN_ALL_CORE
Social History:    Marital Status:   Occupation:   Lives with:     Substance Use :  Tobacco Usage:  (   ) never smoked   (   ) former smoker   (   ) current smoker  (     ) pack year  (        ) last tobacco use date  Alcohol Usage:

## 2021-12-02 NOTE — H&P ADULT - HISTORY OF PRESENT ILLNESS
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 coli UTIs, driveline drainage (1/7/21) and COVID-19 (back in April 2020)  He was recently seen in clinic where he complained of abdominal pain and dark stools w constipation back in May. He presents to [facility] ER with  weakness and fatigue, moderate and + Black stools for three days, on coumadin secondary to warfarin use in the setting of an LVAD. Patient has required transfusions for GIB in the past. Mostly recently back in jan 2021 pt had anemia with dark stools. No interventions was done at that time. However Last Endoscopy was done in July 2020 (negative). Today labs show patient is anemic with H/H of 4.5/16.3,. INR is 8.84 MAP in the 90s, Temp 35.1. He denies any chest pain, shortness of breath, dizziness, abd pain, nausea or vomiting.    Admitted for supratherapeutic INR, GI bleed, c diff, sepsis- r/o pna, reintubation.  He progressed to trach decanulation and  required a new trach placement.  + BC , persistant nausea and vomitting, s/p perc choley.  CTA abd pelvis--> sma stenossis, s/p stent placement -9/10 failed SMA stent, c/b RP bleed s/p 3U PRbc  10/18 s/p sma stent  Dysphagia, PNa- treated and eventually passed his mbs.  He was d/c to rehab with a trach .  He was doing well and was transferred  back today for trach removal

## 2021-12-02 NOTE — H&P ADULT - NSHPPHYSICALEXAM_GEN_ALL_CORE
PHYSICAL EXAM:      Constitutional: no distress        ENMT: trach cdi       Respiratory: cta    Cardiovascular: + hum, regular    Gastrointestinal: non tender non distended, choley drain    Genitourinary: voids    Extremities: - edema - tenderness        Neurological: a &o    Skin: cdi

## 2021-12-02 NOTE — PATIENT PROFILE ADULT - FALL HARM RISK - UNIVERSAL INTERVENTIONS
Bed in lowest position, wheels locked, appropriate side rails in place/Call bell, personal items and telephone in reach/Instruct patient to call for assistance before getting out of bed or chair/Non-slip footwear when patient is out of bed/[place] to call system/Physically safe environment - no spills, clutter or unnecessary equipment/Purposeful Proactive Rounding/Room/bathroom lighting operational, light cord in reach

## 2021-12-02 NOTE — H&P ADULT - ASSESSMENT
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 coli UTIs, driveline drainage (1/7/21) and COVID-19 (back in April 2020)  He was recently seen in clinic where he complained of abdominal pain and dark stools w constipation back in May. He presents to [facility] ER today weakness and fatigue, moderate and + Black stools for three days, on coumadin secondary to warfarin use in the setting of an LVAD. Patient has required transfusions for GIB in the past. Mostly recently back in jan 2021 pt had anemia with dark stools. No interventions was done at that time. However Last Endoscopy was done in July 2020 (negative). Today labs show patient is anemic with H/H of 4.5/16.3,. INR is 8.84 MAP in the 90s, Temp 35.1. He denies any chest pain, shortness of breath, dizziness, abd pain, nausea or vomiting.       (14 Jun 2021 16:57)  Surgery/Hospital Course:  6/14 admit for melena w/ anemia, INR 8.84   6/15 Capsul study (+) for small bowel bleed, balloon endoscopy (old blood in prox ileum); post EGD - septic w/ L opacity, re-intubated for concern for aspiration, TTE (Mod MR, decrease biV w/ interventricular septum boweing towards R)  6/17 bronch   6/20 +C Diff   6/22 CT C/A/P: Fluid filled colon which may be 2/2 rapid transit. Small bilateral pleffs with associates. Compressive atelectasis New ISABELLE & LLL  parenchymal opacities, suspicious for pneumonia. Moderate stenosis in the proximal superior mesenteric artery.   6/25 #8 Shiley trach at bedside   7/1 LVAD speed increased to 9200  7/2 Bronch  7/20 TC since 7/7. Patient transferred to SDU.   7/23 INR today 2.64.  H&H 7.3/24 this AM.  Will repeat CBC at noon, and will send stool guaiac Patient with persistent abdominal tenderness, rate of tube feeds decreased.  No nausea/vomiting.    7/24 INR today 2.4. H&H 9.1/28.6 low flow overnight /N&V, refusing Tube feeds on D5 1/2 normal  @50 cc/hr  7/25 INR 2.69  H&H 7.7/25.1 refusing Tube feeds on D5 1/2 normal  @50 cc/hr. This am + BM Melena Dr Oneill HF  aware- PRBC x1  GI team consulted -  NPO  plan on study in am-  D/w Dr Cadet Patient  to return to CTU for further management; 1PRBCS   7/27 Post op INR 2.2 today.  No bleeding. BC + for SM.  Pt is hypotensive requiring pressor and inotropic support.  ID follow up today on Cefepime will follow.  7/26 R PTC for PTX   7/28 CT C/A/P: sub q emphysema in R chest wall, GGO RUL, small ascites CTH negative; Abd US: GB thickening, pericholecystic fluid    7/30 perc choley  7/31 Perchole drain in place continues to drain total output overnight 133.  Fever today 38.8   8/1 duplex LE negative   8/7 Patient with persistent abdominal pain, refusing tube feeds and medications, Psych consulted  8/13 CTA A/P ordered to r/o mesenteric ischemia 2/2 persistent anorexia, nausea, vomiting. Revealed:  Evaluation of the mesenteric vessels is limited by streak artifact from LVAD. There appears to be severe stenosis of the proximal SMA; abdominal mesenteric doppler is recommended for further evaluation. 2.  No small bowel findings to suggest acute mesenteric ischemia. 3.  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  8/15: Cultures resulted BC 1/2 +GPC in clusters, SC enterobacter; mesenteric duplex: borderline stenosis of proximal SMA  8/27: CT C/A/P noncon: Nondular opacities in R lung apex w/cavitation, abd nl  8/31:  Continue current care, treatment of thyrotoxicosis with medications as per endocrine, d/c ABX as per team.   9/8 RUQ sono: Contracted gallbladder with cholecystostomy tube in place.  9/10 failed SMA stent, c/b RP bleed s/p 3U PRCB  9/12 CTA Abd/Pelvis L RP hematoma   9/24 Trach decannulated   9/26 intubated fro resp distress for increased WOB, LL pneumonia; CT C/A/P: progressive ISABELLE opacities and L consolidations, multifocal pneumonia   9/30 TTE (EF 25%, decreased RV, mod MR)   10/3 VT -> Lidocaine gtt   10/4 Trach size 6 DCT cuff  10/5 CT abd (neg for intra-abd abcess, severe stenosis of prox SMA and b/l iliac arteries)  10/18 SMA Stent   10/23 Emend for nausea   10/24 +Occult  10/29 FEES, RUQ ascites and pericholecystic fluid   Theckened liqwuids puree diet- calorie ct    Today:  Ambulation, TC, and PO diet as tolerated.  Day 2 of calorie count  Discussions yesterday as to discharge planning, agreeable to go to [facility] however will need to be decannulated and NGT removed.  Patient candidate for SDU.   11/4 Transferred to sdu.  Must be oob - chair for all meals.  Eventual change to uncuffed trach.  11/5 VSS; rsr/ st ; changed trach to #6 shiley cuffless this am by ENT- tolerated well; continue nutritional support with kaofeed; h/h stable 8/26 today   DNR rescinded today as per CHF/ LVAD team   11/6 VSS: pt tolerating supervised po diet; shantelle count in progress; trach care/suctioning q 3 hours; increase hydral 20 q8 as per CHF/LVAD team  11/7   OOB to chair,   vss   keofeed w/ shantelle count    dawn drain    ald 25 qd  added by HF,  no LVAD alarms  11/8 sluggish today, refusing meds.    Plan to hold TF and increase PO intake. If adequate intake plan to d/c ngt  11/9   Cont off TF, calorie count .  11/10 The patient is non compliant today, not stating reasons why Secretions , suction q2-3 Calorie count, remains off TF  11/11 Pending placement at [facility]. Pt was assessed for diet upgrade without PMV in place.   Easy to chew texture diet with Thin liquids via SMALL SINGLE SIPS. Maintain upright posture during/after eating for 30 mins; oral hygiene; position upright (90 degrees); small sips/bites. 100% supervision  11/12 Difficulty placing at [facility] , may  now work towards decannulation, PMV trials at bedside.  'Encourage PO intake.  11/13 VSS pt ate 100% dinner last evening per nsg staff. will cap during the day as goal is to eventually decannulate pt.  11/14 VSS - SW requesting COVID swab for possible d/c to rehab this week  11/16 vss - accepted to [facility] - await available bed & insurance authorization from [facility].  Readmitted  for trach removal

## 2021-12-02 NOTE — PATIENT PROFILE ADULT - DEAF OR HARD OF HEARING?
Subjective:      Patient ID: Eva Brewer is a 29 y.o. female. HPI  Well exam   She is doing fine  Had her appendix out earlier this year  Seeing specialist about endometriosis and pulsatile tinnitus    Review of Systems   Constitutional: Negative. HENT: Positive for tinnitus. Respiratory: Negative. Cardiovascular: Negative. Gastrointestinal: Negative. Genitourinary: Positive for menstrual problem. Skin: Negative. Neurological: Negative. YOB: 1984    Date of Visit:  9/24/2020    Allergies   Allergen Reactions    Amoxicillin Hives    Pcn [Penicillins] Rash       No outpatient medications have been marked as taking for the 9/24/20 encounter (Office Visit) with Isaias Kamara DO. Vitals:    09/24/20 0843   BP: 130/80   Temp: 98.1 °F (36.7 °C)   Weight: 173 lb (78.5 kg)   Height: 5' 7.5\" (1.715 m)     Body mass index is 26.7 kg/m². Wt Readings from Last 3 Encounters:   09/24/20 173 lb (78.5 kg)   02/04/20 176 lb (79.8 kg)   04/24/19 171 lb (77.6 kg)     BP Readings from Last 3 Encounters:   09/24/20 130/80   02/04/20 116/72   04/24/19 110/74     Allergies   Allergen Reactions    Amoxicillin Hives    Pcn [Penicillins] Rash     No current outpatient medications on file. No current facility-administered medications for this visit.       Past Medical History:   Diagnosis Date    Tinnitus      Past Surgical History:   Procedure Laterality Date    APPENDECTOMY      COLPOSCOPY      WISDOM TOOTH EXTRACTION       Social History     Tobacco Use    Smoking status: Former Smoker     Packs/day: 0.50     Types: Cigarettes     Last attempt to quit: 1/18/2017     Years since quitting: 3.6    Smokeless tobacco: Never Used    Tobacco comment: encouraged to pt to  stop smoking    Substance Use Topics    Alcohol use: Yes     Comment: occassionally    Drug use: No     Family History   Problem Relation Age of Onset    Cancer Mother         melanoma    Cancer Father
no

## 2021-12-03 NOTE — PHYSICAL THERAPY INITIAL EVALUATION ADULT - ADDITIONAL COMMENTS
Per chart review, pt lives in a house with his friend, +7 steps to enter, +first floor set-up. PTA IND with ADL using cane/walker at times. Per pt, pt going to home w/ family support. Its a pvt house w/ 3 step to enter. Denies any stairs w/in home. PTA amb w/ st cane and indep w/ all ADLs

## 2021-12-03 NOTE — CONSULT NOTE ADULT - PROBLEM SELECTOR RECOMMENDATION 9
- continue Hydralazine 25 mg PO TID, and Toprol XL 25 mg PO   - continue Aldactone 25 mg PO QD  - please give 1L NS today as patient appears hypovolemic

## 2021-12-03 NOTE — CONSULT NOTE ADULT - ASSESSMENT
65 YO M with a history of stage D NICM s/p HM2 on 9/2017 as DT (due to severe PAD) with TV ring, prior COVID-19 infection 4/2020, recurrent syncope post LVAD s/p ILR, and chronic abdominal pain was previously admitted and was noted to have a prolong hospital course (6/14-11/16). During that time he has multiple infections and now remains on suppressive antibiotics. Underwent SMA stent with Vascular in 10/2021, now tolerating PO diet. Ultimately was trach and discharged to rehab to get stronger. Patient returned from rehab for trach decannulation, which was removed on 12/2. His MAPs now are slightly elevated and he is noted hyponatremic likely due to hypovolemia and will be given IVF. PT will come and asses patient to determine if services need to be arranged for home. Currently no signs of LVAD dysfunction

## 2021-12-03 NOTE — DISCHARGE NOTE NURSING/CASE MANAGEMENT/SOCIAL WORK - NSDCPEFALRISK_GEN_ALL_CORE
For information on Fall & Injury Prevention, visit: [email or web address] OR  [email or web address] OR  https://www.cdc.gov/steadi/patient.html

## 2021-12-03 NOTE — CONSULT NOTE ADULT - PROBLEM SELECTOR RECOMMENDATION 2
- continue LVAD speed at 9200 rpm  - Continue ASA 81 mg PO QD   - Remains off Coumadin due to GI bleeding   - Remains on Cipro 500 mg BID for suppressive antibiotics

## 2021-12-03 NOTE — DISCHARGE NOTE NURSING/CASE MANAGEMENT/SOCIAL WORK - PATIENT PORTAL LINK FT
You can access the FollowMyHealth Patient Portal offered by [facility] by registering at the following website: [email or web address]. By joining [email or web address] FollowMyHealth portal, you will also be able to view your health information using other applications (apps) compatible with our system.

## 2021-12-03 NOTE — PROGRESS NOTE ADULT - ASSESSMENT
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 coli UTIs, driveline drainage (1/7/21) and COVID-19 (back in April 2020)  He was recently seen in clinic where he complained of abdominal pain and dark stools w constipation back in May. He presents to [facility] ER today weakness and fatigue, moderate and + Black stools for three days, on coumadin secondary to warfarin use in the setting of an LVAD. Patient has required transfusions for GIB in the past. Mostly recently back in jan 2021 pt had anemia with dark stools. No interventions was done at that time. However Last Endoscopy was done in July 2020 (negative). Today labs show patient is anemic with H/H of 4.5/16.3,. INR is 8.84 MAP in the 90s, Temp 35.1. He denies any chest pain, shortness of breath, dizziness, abd pain, nausea or vomiting.       (14 Jun 2021 16:57)  Surgery/Hospital Course:  6/14 admit for melena w/ anemia, INR 8.84   6/15 Capsul study (+) for small bowel bleed, balloon endoscopy (old blood in prox ileum); post EGD - septic w/ L opacity, re-intubated for concern for aspiration, TTE (Mod MR, decrease biV w/ interventricular septum boweing towards R)  6/17 bronch   6/20 +C Diff   6/22 CT C/A/P: Fluid filled colon which may be 2/2 rapid transit. Small bilateral pleffs with associates. Compressive atelectasis New ISABELLE & LLL  parenchymal opacities, suspicious for pneumonia. Moderate stenosis in the proximal superior mesenteric artery.   6/25 #8 Shiley trach at bedside   7/1 LVAD speed increased to 9200  7/2 Bronch  7/20 TC since 7/7. Patient transferred to SDU.   7/23 INR today 2.64.  H&H 7.3/24 this AM.  Will repeat CBC at noon, and will send stool guaiac Patient with persistent abdominal tenderness, rate of tube feeds decreased.  No nausea/vomiting.    7/24 INR today 2.4. H&H 9.1/28.6 low flow overnight /N&V, refusing Tube feeds on D5 1/2 normal  @50 cc/hr  7/25 INR 2.69  H&H 7.7/25.1 refusing Tube feeds on D5 1/2 normal  @50 cc/hr. This am + BM Melena Dr Oneill HF  aware- PRBC x1  GI team consulted -  NPO  plan on study in am-  D/w Dr Cadet Patient  to return to CTU for further management; 1PRBCS   7/27 Post op INR 2.2 today.  No bleeding. BC + for SM.  Pt is hypotensive requiring pressor and inotropic support.  ID follow up today on Cefepime will follow.  7/26 R PTC for PTX   7/28 CT C/A/P: sub q emphysema in R chest wall, GGO RUL, small ascites CTH negative; Abd US: GB thickening, pericholecystic fluid    7/30 perc choley  7/31 Perchole drain in place continues to drain total output overnight 133.  Fever today 38.8   8/1 duplex LE negative   8/7 Patient with persistent abdominal pain, refusing tube feeds and medications, Psych consulted  8/13 CTA A/P ordered to r/o mesenteric ischemia 2/2 persistent anorexia, nausea, vomiting. Revealed:  Evaluation of the mesenteric vessels is limited by streak artifact from LVAD. There appears to be severe stenosis of the proximal SMA; abdominal mesenteric doppler is recommended for further evaluation. 2.  No small bowel findings to suggest acute mesenteric ischemia. 3.  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  8/15: Cultures resulted BC 1/2 +GPC in clusters, SC enterobacter; mesenteric duplex: borderline stenosis of proximal SMA  8/27: CT C/A/P noncon: Nondular opacities in R lung apex w/cavitation, abd nl  8/31:  Continue current care, treatment of thyrotoxicosis with medications as per endocrine, d/c ABX as per team.   9/8 RUQ sono: Contracted gallbladder with cholecystostomy tube in place.  9/10 failed SMA stent, c/b RP bleed s/p 3U PRCB  9/12 CTA Abd/Pelvis L RP hematoma   9/24 Trach decannulated   9/26 intubated fro resp distress for increased WOB, LL pneumonia; CT C/A/P: progressive ISABELLE opacities and L consolidations, multifocal pneumonia   9/30 TTE (EF 25%, decreased RV, mod MR)   10/3 VT -> Lidocaine gtt   10/4 Trach size 6 DCT cuff  10/5 CT abd (neg for intra-abd abcess, severe stenosis of prox SMA and b/l iliac arteries)  10/18 SMA Stent   10/23 Emend for nausea   10/24 +Occult  10/29 FEES, RUQ ascites and pericholecystic fluid   Theckened liqwuids puree diet- calorie ct    Today:  Ambulation, TC, and PO diet as tolerated.  Day 2 of calorie count  Discussions yesterday as to discharge planning, agreeable to go to [facility] however will need to be decannulated and NGT removed.  Patient candidate for SDU.   11/4 Transferred to sdu.  Must be oob - chair for all meals.  Eventual change to uncuffed trach.  11/5 VSS; rsr/ st ; changed trach to #6 shiley cuffless this am by ENT- tolerated well; continue nutritional support with kaofeed; h/h stable 8/26 today   DNR rescinded today as per CHF/ LVAD team   11/6 VSS: pt tolerating supervised po diet; shantelle count in progress; trach care/suctioning q 3 hours; increase hydral 20 q8 as per CHF/LVAD team  11/7   OOB to chair,   vss   keofeed w/ shantelle count    dawn drain    ald 25 qd  added by HF,  no LVAD alarms  11/8 sluggish today, refusing meds.    Plan to hold TF and increase PO intake. If adequate intake plan to d/c ngt  11/9   Cont off TF, calorie count .  11/10 The patient is non compliant today, not stating reasons why Secretions , suction q2-3 Calorie count, remains off TF  11/11 Pending placement at [facility]. Pt was assessed for diet upgrade without PMV in place.   Easy to chew texture diet with Thin liquids via SMALL SINGLE SIPS. Maintain upright posture during/after eating for 30 mins; oral hygiene; position upright (90 degrees); small sips/bites. 100% supervision  11/12 Difficulty placing at [facility] , may  now work towards decannulation, PMV trials at bedside.  'Encourage PO intake.  11/13 VSS pt ate 100% dinner last evening per nsg staff. will cap during the day as goal is to eventually decannulate pt.  11/14 VSS - SW requesting COVID swab for possible d/c to rehab this week  11/16 vss - accepted to [facility] - await available bed & insurance authorization from [facility].    152/2 Readmitted  for trach removal  Respiratyory status stable  stable for d/c  Family unable to take patient 65M PMH ACC/AHA stage D HF due to NICM HM2 LVAD , TV annuloplasty ring 9/12/17 as destination therapy due to severe peripheral artery disease with significant stenosis  SIADH, Depression, CKD-3 with hyperkalemia, past E. coli UTIs, driveline drainage (1/7/21) and COVID-19 (back in April 2020)  He was recently seen in clinic where he complained of abdominal pain and dark stools w constipation back in May. He presents to [facility] ER today weakness and fatigue, moderate and + Black stools for three days, on coumadin secondary to warfarin use in the setting of an LVAD. Patient has required transfusions for GIB in the past. Mostly recently back in jan 2021 pt had anemia with dark stools. No interventions was done at that time. However Last Endoscopy was done in July 2020 (negative). Today labs show patient is anemic with H/H of 4.5/16.3,. INR is 8.84 MAP in the 90s, Temp 35.1. He denies any chest pain, shortness of breath, dizziness, abd pain, nausea or vomiting.       (14 Jun 2021 16:57)  Surgery/Hospital Course:  6/14 admit for melena w/ anemia, INR 8.84   6/15 Capsul study (+) for small bowel bleed, balloon endoscopy (old blood in prox ileum); post EGD - septic w/ L opacity, re-intubated for concern for aspiration, TTE (Mod MR, decrease biV w/ interventricular septum boweing towards R)  6/17 bronch   6/20 +C Diff   6/22 CT C/A/P: Fluid filled colon which may be 2/2 rapid transit. Small bilateral pleffs with associates. Compressive atelectasis New ISABELLE & LLL  parenchymal opacities, suspicious for pneumonia. Moderate stenosis in the proximal superior mesenteric artery.   6/25 #8 Shiley trach at bedside   7/1 LVAD speed increased to 9200  7/2 Bronch  7/20 TC since 7/7. Patient transferred to SDU.   7/23 INR today 2.64.  H&H 7.3/24 this AM.  Will repeat CBC at noon, and will send stool guaiac Patient with persistent abdominal tenderness, rate of tube feeds decreased.  No nausea/vomiting.    7/24 INR today 2.4. H&H 9.1/28.6 low flow overnight /N&V, refusing Tube feeds on D5 1/2 normal  @50 cc/hr  7/25 INR 2.69  H&H 7.7/25.1 refusing Tube feeds on D5 1/2 normal  @50 cc/hr. This am + BM Melena Dr Oneill HF  aware- PRBC x1  GI team consulted -  NPO  plan on study in am-  D/w Dr Cadet Patient  to return to CTU for further management; 1PRBCS   7/27 Post op INR 2.2 today.  No bleeding. BC + for SM.  Pt is hypotensive requiring pressor and inotropic support.  ID follow up today on Cefepime will follow.  7/26 R PTC for PTX   7/28 CT C/A/P: sub q emphysema in R chest wall, GGO RUL, small ascites CTH negative; Abd US: GB thickening, pericholecystic fluid    7/30 perc choley  7/31 Perchole drain in place continues to drain total output overnight 133.  Fever today 38.8   8/1 duplex LE negative   8/7 Patient with persistent abdominal pain, refusing tube feeds and medications, Psych consulted  8/13 CTA A/P ordered to r/o mesenteric ischemia 2/2 persistent anorexia, nausea, vomiting. Revealed:  Evaluation of the mesenteric vessels is limited by streak artifact from LVAD. There appears to be severe stenosis of the proximal SMA; abdominal mesenteric doppler is recommended for further evaluation. 2.  No small bowel findings to suggest acute mesenteric ischemia. 3.  Focal dissections involving the right and left common iliac arteries.  8/15: Cultures resulted BC 1/2 +GPC in clusters, SC enterobacter; mesenteric duplex: borderline stenosis of proximal SMA  8/27: CT C/A/P noncon: Nondular opacities in R lung apex w/cavitation, abd nl  8/31:  Continue current care, treatment of thyrotoxicosis with medications as per endocrine, d/c ABX as per team.   9/8 RUQ sono: Contracted gallbladder with cholecystostomy tube in place.  9/10 failed SMA stent, c/b RP bleed s/p 3U PRCB  9/12 CTA Abd/Pelvis L RP hematoma   9/24 Trach decannulated   9/26 intubated fro resp distress for increased WOB, LL pneumonia; CT C/A/P: progressive ISABELLE opacities and L consolidations, multifocal pneumonia   9/30 TTE (EF 25%, decreased RV, mod MR)   10/3 VT -> Lidocaine gtt   10/4 Trach size 6 DCT cuff  10/5 CT abd (neg for intra-abd abcess, severe stenosis of prox SMA and b/l iliac arteries)  10/18 SMA Stent   10/23 Emend for nausea   10/24 +Occult  10/29 FEES, RUQ ascites and pericholecystic fluid   Theckened liqwuids puree diet- calorie ct    Today:  Ambulation, TC, and PO diet as tolerated.  Day 2 of calorie count  Discussions yesterday as to discharge planning, agreeable to go to [facility] however will need to be decannulated and NGT removed.  Patient candidate for SDU.   11/4 Transferred to sdu.  Must be oob - chair for all meals.  Eventual change to uncuffed trach.  11/5 VSS; rsr/ st ; changed trach to #6 shiley cuffless this am by ENT- tolerated well; continue nutritional support with kaofeed; h/h stable 8/26 today   DNR rescinded today as per CHF/ LVAD team   11/6 VSS: pt tolerating supervised po diet; shantelle count in progress; trach care/suctioning q 3 hours; increase hydral 20 q8 as per CHF/LVAD team  11/7   OOB to chair,   vss   keofeed w/ shantelle count    dawn drain    ald 25 qd  added by HF,  no LVAD alarms  11/8 sluggish today, refusing meds.    Plan to hold TF and increase PO intake. If adequate intake plan to d/c ngt  11/9   Cont off TF, calorie count .  11/10 The patient is non compliant today, not stating reasons why Secretions , suction q2-3 Calorie count, remains off TF  11/11 Pending placement at [facility]. Pt was assessed for diet upgrade without PMV in place.   Easy to chew texture diet with Thin liquids via SMALL SINGLE SIPS. Maintain upright posture during/after eating for 30 mins; oral hygiene; position upright (90 degrees); small sips/bites. 100% supervision  11/12 Difficulty placing at [facility] , may  now work towards decannulation, PMV trials at bedside.  'Encourage PO intake.  11/13 VSS pt ate 100% dinner last evening per nsg staff. will cap during the day as goal is to eventually decannulate pt.  11/14 VSS - SW requesting COVID swab for possible d/c to rehab this week  11/16 vss - accepted to [facility] - await available bed & insurance authorization from [facility].    152/2 Readmitted  for trach removal  Respiratyory status stable  IVF for low map  stable for d/c  Family unable to take patient 65M PMH ACC/AHA stage D HF due to NICM HM2 LVAD , TV annuloplasty ring 9/12/17 as destination therapy due to severe peripheral artery disease with significant stenosis  SIADH, Depression, CKD-3 with hyperkalemia, past E. coli UTIs, driveline drainage (1/7/21) and COVID-19 (back in April 2020)  He was recently seen in clinic where he complained of abdominal pain and dark stools w constipation back in May. He presents to [facility] ER today weakness and fatigue, moderate and + Black stools for three days, on coumadin secondary to warfarin use in the setting of an LVAD. Patient has required transfusions for GIB in the past. Mostly recently back in jan 2021 pt had anemia with dark stools. No interventions was done at that time. However Last Endoscopy was done in July 2020 (negative). Today labs show patient is anemic with H/H of 4.5/16.3,. INR is 8.84 MAP in the 90s, Temp 35.1. He denies any chest pain, shortness of breath, dizziness, abd pain, nausea or vomiting.       (14 Jun 2021 16:57)  Surgery/Hospital Course:  6/14 admit for melena w/ anemia, INR 8.84   6/15 Capsul study (+) for small bowel bleed, balloon endoscopy (old blood in prox ileum); post EGD - septic w/ L opacity, re-intubated for concern for aspiration, TTE (Mod MR, decrease biV w/ interventricular septum boweing towards R)  6/17 bronch   6/20 +C Diff   6/22 CT C/A/P: Fluid filled colon which may be 2/2 rapid transit. Small bilateral pleffs with associates. Compressive atelectasis New ISABELLE & LLL  parenchymal opacities, suspicious for pneumonia. Moderate stenosis in the proximal superior mesenteric artery.   6/25 #8 Shiley trach at bedside   7/1 LVAD speed increased to 9200  7/2 Bronch  7/20 TC since 7/7. Patient transferred to SDU.   7/23 INR today 2.64.  H&H 7.3/24 this AM.  Will repeat CBC at noon, and will send stool guaiac Patient with persistent abdominal tenderness, rate of tube feeds decreased.  No nausea/vomiting.    7/24 INR today 2.4. H&H 9.1/28.6 low flow overnight /N&V, refusing Tube feeds on D5 1/2 normal  @50 cc/hr  7/25 INR 2.69  H&H 7.7/25.1 refusing Tube feeds on D5 1/2 normal  @50 cc/hr. This am + BM Melena Dr Oneill HF  aware- PRBC x1  GI team consulted -  NPO  plan on study in am-  D/w Dr Cadet Patient  to return to CTU for further management; 1PRBCS   7/27 Post op INR 2.2 today.  No bleeding. BC + for SM.  Pt is hypotensive requiring pressor and inotropic support.  ID follow up today on Cefepime will follow.  7/26 R PTC for PTX   7/28 CT C/A/P: sub q emphysema in R chest wall, GGO RUL, small ascites CTH negative; Abd US: GB thickening, pericholecystic fluid    7/30 perc choley  7/31 Perchole drain in place continues to drain total output overnight 133.  Fever today 38.8   8/1 duplex LE negative   8/7 Patient with persistent abdominal pain, refusing tube feeds and medications, Psych consulted  8/13 CTA A/P ordered to r/o mesenteric ischemia 2/2 persistent anorexia, nausea, vomiting. Revealed:  Evaluation of the mesenteric vessels is limited by streak artifact from LVAD. There appears to be severe stenosis of the proximal SMA; abdominal mesenteric doppler is recommended for further evaluation. 2.  No small bowel findings to suggest acute mesenteric ischemia. 3.  Focal dissections involving the right and left common iliac arteries.  8/15: Cultures resulted BC 1/2 +GPC in clusters, SC enterobacter; mesenteric duplex: borderline stenosis of proximal SMA  8/27: CT C/A/P noncon: Nondular opacities in R lung apex w/cavitation, abd nl  8/31:  Continue current care, treatment of thyrotoxicosis with medications as per endocrine, d/c ABX as per team.   9/8 RUQ sono: Contracted gallbladder with cholecystostomy tube in place.  9/10 failed SMA stent, c/b RP bleed s/p 3U PRCB  9/12 CTA Abd/Pelvis L RP hematoma   9/24 Trach decannulated   9/26 intubated fro resp distress for increased WOB, LL pneumonia; CT C/A/P: progressive ISABELLE opacities and L consolidations, multifocal pneumonia   9/30 TTE (EF 25%, decreased RV, mod MR)   10/3 VT -> Lidocaine gtt   10/4 Trach size 6 DCT cuff  10/5 CT abd (neg for intra-abd abcess, severe stenosis of prox SMA and b/l iliac arteries)  10/18 SMA Stent   10/23 Emend for nausea   10/24 +Occult  10/29 FEES, RUQ ascites and pericholecystic fluid   Theckened liqwuids puree diet- calorie ct    Today:  Ambulation, TC, and PO diet as tolerated.  Day 2 of calorie count  Discussions yesterday as to discharge planning, agreeable to go to [facility] however will need to be decannulated and NGT removed.  Patient candidate for SDU.   11/4 Transferred to sdu.  Must be oob - chair for all meals.  Eventual change to uncuffed trach.  11/5 VSS; rsr/ st ; changed trach to #6 shiley cuffless this am by ENT- tolerated well; continue nutritional support with kaofeed; h/h stable 8/26 today   DNR rescinded today as per CHF/ LVAD team   11/6 VSS: pt tolerating supervised po diet; shantelle count in progress; trach care/suctioning q 3 hours; increase hydral 20 q8 as per CHF/LVAD team  11/7   OOB to chair,   vss   keofeed w/ shantelle count    dawn drain    ald 25 qd  added by HF,  no LVAD alarms  11/8 sluggish today, refusing meds.    Plan to hold TF and increase PO intake. If adequate intake plan to d/c ngt  11/9   Cont off TF, calorie count .  11/10 The patient is non compliant today, not stating reasons why Secretions , suction q2-3 Calorie count, remains off TF  11/11 Pending placement at [facility]. Pt was assessed for diet upgrade without PMV in place.   Easy to chew texture diet with Thin liquids via SMALL SINGLE SIPS. Maintain upright posture during/after eating for 30 mins; oral hygiene; position upright (90 degrees); small sips/bites. 100% supervision  11/12 Difficulty placing at [facility] , may  now work towards decannulation, PMV trials at bedside.  'Encourage PO intake.  11/13 VSS pt ate 100% dinner last evening per nsg staff. will cap during the day as goal is to eventually decannulate pt.  11/14 VSS - SW requesting COVID swab for possible d/c to rehab this week  11/16 vss - accepted to [facility] - await available bed & insurance authorization from [facility].    152/2 Readmitted  for trach removal  Respiratyory status stable  IVF for low map and overall fluid defecit  stable for d/c  Family unable to take patient

## 2021-12-03 NOTE — CONSULT NOTE ADULT - ATTENDING COMMENTS
Transferred from [facility] for trach decannulation and discharge planning. Appears hypovolemic with hyponatremia so will administer 1L NS. No one available to let him into his house today so tentative plan is for discharge tomorrow. No LVAD issues. Advised increased PO hydration at home.

## 2021-12-03 NOTE — PROGRESS NOTE ADULT - SUBJECTIVE AND OBJECTIVE BOX
VITAL SIGNS    Telemetry:    st 108  Vital Signs Last 24 Hrs  T(C): 36.7 (21 @ 07:33), Max: 36.7 (21 @ 06:00)  T(F): 98 (21 @ 07:33), Max: 98 (21 @ 06:00)  HR: 110 (21 @ 13:25) (100 - 124)  BP: 92/68 (21 @ 13:25) (88/66 - 94/68)  RR: 18 (21 @ 07:33) (16 - 18)  SpO2: 100% (21 @ 13:25) (98% - 100%)                    @ 07:01  -   @ 07:00  --------------------------------------------------------  IN: 520 mL / OUT: 1190 mL / NET: -670 mL     @ 07:01  -   @ 17:24  --------------------------------------------------------  IN: 820 mL / OUT: 680 mL / NET: 140 mL          Daily     Daily Weight in k (03 Dec 2021 00:00)            CAPILLARY BLOOD GLUCOSE                Drains:     MS         [  ] Drainage:                 L Pleural  [  ]  Drainage:                R Pleural  [  ]  Drainage: R dawn    Pacing Wires           Coumadin    [ ] YES          [x  ]      NO         gi bleed                          PHYSICAL EXAM        Neurology: alert and oriented x 3, nonfocal, no gross deficits  CV : + humm  Sternal Wound :  CDI , Stable  Lungs: upperrhonchi.  Trach site cdi, remains patent   Abdomen: soft, nontender, nondistended, positive bowel sounds, driverline cdi  Dawn drain  biliary drainage                  :    voiding         Extremities:    trace   edema   /  -   calve tenderness ,             aspirin  chewable 81 milliGRAM(s) Oral daily  ciprofloxacin     Tablet 500 milliGRAM(s) Oral every 12 hours  hydrALAZINE 25 milliGRAM(s) Oral every 8 hours  influenza  Vaccine (HIGH DOSE) 0.7 milliLiter(s) IntraMuscular once  lactobacillus acidophilus 1 Tablet(s) Oral daily  magnesium oxide 400 milliGRAM(s) Oral every 12 hours  methimazole 10 milliGRAM(s) Oral daily  metoclopramide   Syrup 5 milliGRAM(s) Oral three times a day before meals  metoprolol succinate ER 25 milliGRAM(s) Oral daily  mirtazapine 7.5 milliGRAM(s) Oral at bedtime  multivitamin 1 Tablet(s) Oral daily  pantoprazole    Tablet 40 milliGRAM(s) Oral two times a day  sertraline 100 milliGRAM(s) Oral daily  spironolactone 25 milliGRAM(s) Oral daily  vancomycin    Solution 125 milliGRAM(s) Oral every 12 hours                    Physical Therapy Rec:   Home  [  ]   Home w/ PT  [  ]  Rehab  [  ]  Discussed with Cardiothoracic Team at AM rounds.

## 2021-12-03 NOTE — CONSULT NOTE ADULT - SUBJECTIVE AND OBJECTIVE BOX
HPI: 67 YO M with a history of stage D NICM s/p HM2 on 2017 as DT (due to severe PAD) with TV ring, prior COVID-19 infection 2020, recurrent syncope post LVAD s/p ILR, and chronic abdominal pain with prior negative workup who was admitted 21 with symptomatic anemia with Hgb 4.5 in setting of INR 8.8 without hemodynamic instability and has since had a protracted hospitalization. He was transfused and underwent VCE which showed active bleeding in the mid small bowel but subsequent enteroscopy 6/15 did not reveal any active bleeding. He acutely decompensated after procedure with fever/hypertension, low flow alarms, and pulmonary infiltrate with hypoxia requiring intubation from probable aspiration PNA. He was unable to extubated and has since undergone tracheostomy but tolerating persistent trach collar and nearing ability for decannulation. 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 He underwent angiogram on 9/10, stent was unable to be placed and course was then complicated by RP bleed. He continued to have persistent leukocytosis and febrile episodes and was noted to have positive sputum culture for serratia marcescens and completed his course of abx. He was initially decannulated on . Recently he started having multiples of melena, emesis and went into acte respiratory distress and was ultimately re-intubated on .     He had blood cultures positive for enterobacter cloacae/serratia and sputum positive for stenotrophomonas remains on IV antibiotics. He is s/p trach with ENT on 10/4. He underwent SMA stent x 2 on 10/18. His nausea and vomiting has improved, and he is now tolerating PO diet along with tube feeds. His DNR/DNI has been rescinded. Diet has been advanced and keotube has been removed. He is currently tolerating trach collar. He has returned from rehab for decannulation.      PAST MEDICAL & SURGICAL HISTORY:  CHF (congestive heart failure)    CAD (coronary artery disease)    Depression    Pleural effusion    History of  novel coronavirus disease (COVID-19)  2020    Hemorrhoids    Bleeding hemorrhoids    Peripheral arterial disease    Claudication    BPH with urinary obstruction    ACC/AHA stage D systolic heart failure    Anticoagulation goal of INR 2.0 to 2.5    Falls    Clavicle fracture    CKD (chronic kidney disease), stage III    Iron deficiency anemia    H/O epistaxis    Vertigo    GI bleed    S/P TVR (tricuspid valve repair)    S/P ventricular assist device    S/P endoscopy    H/O tracheostomy        Home Medications:  aspirin 81 mg oral tablet, chewable: 1 tab(s) orally once a day (2021 13:43)  chlorhexidine 2% topical pad: 1 application topically  (2021 13:43)  ciprofloxacin 500 mg oral tablet: 1 tab(s) orally every 12 hours (2021 13:43)  hydrALAZINE 25 mg oral tablet: 1 tab(s) orally every 8 hours (2021 13:43)  magnesium oxide 400 mg oral tablet: 1 tab(s) orally every 12 hours (2021 13:43)  methIMAzole 10 mg oral tablet: 1 tab(s) orally once a day (2021 13:43)  metoclopramide 5 mg/5 mL oral syrup: 5 milligram(s) orally 3 times a day (with meals) (02 Dec 2021 15:01)  metoprolol succinate 50 mg oral tablet, extended release: 1 tab(s) orally once a day (2021 13:43)  mirtazapine 7.5 mg oral tablet: 1 tab(s) orally once a day (at bedtime) (2021 13:43)  pantoprazole 40 mg oral delayed release tablet: 1 tab(s) orally 2 times a day (2021 13:43)  sertraline 100 mg oral tablet: 1 tab(s) orally once a day (2021 13:43)  spironolactone 25 mg oral tablet: 1 tab(s) orally once a day (2021 13:43)  vancomycin 25 mg/mL oral liquid: 125 milligram(s) orally every 12 hours (2021 13:43)      Medications:  aspirin  chewable 81 milliGRAM(s) Oral daily  ciprofloxacin     Tablet 500 milliGRAM(s) Oral every 12 hours  hydrALAZINE 25 milliGRAM(s) Oral every 8 hours  influenza  Vaccine (HIGH DOSE) 0.7 milliLiter(s) IntraMuscular once  lactobacillus acidophilus 1 Tablet(s) Oral daily  magnesium oxide 400 milliGRAM(s) Oral every 12 hours  methimazole 10 milliGRAM(s) Oral daily  metoclopramide   Syrup 5 milliGRAM(s) Oral three times a day before meals  metoprolol succinate ER 25 milliGRAM(s) Oral daily  mirtazapine 7.5 milliGRAM(s) Oral at bedtime  multivitamin 1 Tablet(s) Oral daily  pantoprazole    Tablet 40 milliGRAM(s) Oral two times a day  sertraline 100 milliGRAM(s) Oral daily  sodium chloride 0.9% Bolus 1000 milliLiter(s) IV Bolus once  spironolactone 25 milliGRAM(s) Oral daily  vancomycin    Solution 125 milliGRAM(s) Oral every 12 hours      Physical Exam:    Vitals:  Vital Signs Last 24 Hours  T(C): 36.7 (21 @ 07:33), Max: 36.7 (21 @ 06:00)  HR: 110 (21 @ 13:25) (100 - 124)  BP: 92/68 (21 @ 13:25) (88/66 - 105/78)  RR: 18 (21 @ 07:33) (16 - 18)  SpO2: 100% (21 @ 13:25) (98% - 100%)    Weight in k ( @ 00:00)    I&O's Summary    02 Dec 2021 07:  -  03 Dec 2021 07:00  --------------------------------------------------------  IN: 520 mL / OUT: 1190 mL / NET: -670 mL    03 Dec 2021 07:  -  03 Dec 2021 14:21  --------------------------------------------------------  IN: 460 mL / OUT: 280 mL / NET: 180 mL        Physical Exam  General: No distress. Comfortable.  HEENT: EOM intact.  Neck: Neck supple. JVP not elevated. No masses  Chest: Clear to auscultation bilaterally  CV: Normal S1 and S2. No murmurs, rub, or gallops. Radial pulses normal.  Abdomen: Soft, non-distended, non-tender  Skin: No rashes or skin breakdown  Neurology: Alert and oriented times three.    LVAD Interrogation  VAD TYPE HM 2  Speed 9200  Flow 5.1  Power 5.5  PI  5.5  Assessment of driveline exit site: driveline dressing c/.d/i  Programming changes: no changes made    Labs:                        11.7   9.11  )-----------( 220      ( 03 Dec 2021 06:39 )             35.9         128<L>  |  95<L>  |  10  ----------------------------<  98  4.3   |  22  |  0.66    Ca    9.0      02 Dec 2021 16:58    TPro  8.2  /  Alb  3.5  /  TBili  0.3  /  DBili  x   /  AST  27  /  ALT  20  /  AlkPhos  146<H>  12    PT/INR - ( 02 Dec 2021 16:58 )   PT: 13.5 sec;   INR: 1.13 ratio         PTT - ( 03 Dec 2021 06:40 )  PTT:29.9 sec          Lactate Dehydrogenase, Serum: 240 U/L ( @ 06:40)

## 2021-12-03 NOTE — PHYSICAL THERAPY INITIAL EVALUATION ADULT - PERTINENT HX OF CURRENT PROBLEM, REHAB EVAL
65M Male s/p LVAD , TV annuloplasty ring 9/12/17 (destination therapy 2/2 severe PAD) SIADH, Depression, CKD-3, past E. coli UTIs, driveline drainage (1/7/21), CV-19 (4/2020). In May 2021 admit for supratherapeutic INR, GI bleed, c diff, sepsis- r/o pna, reintubation. Hosp course c/b +BC, persistant N/V, s/p perc choley.  CTA abd pelvis-> sma stenossis, s/p stent placement -9/10 failed SMA stent, c/b RP bleed, 10/18 s/p sma stent,  d/c to rehab with a trach & now returns for trach removal.

## 2021-12-04 NOTE — PROGRESS NOTE ADULT - ASSESSMENT
67 YO M with a history of stage D NICM s/p HM2 on 9/2017 as DT (due to severe PAD) with TV ring, prior COVID-19 infection 4/2020, recurrent syncope post LVAD s/p ILR, and chronic abdominal pain was previously admitted and was noted to have a prolong hospital course (6/14-11/16). During that time he has multiple infections and now remains on suppressive antibiotics. Underwent SMA stent with Vascular in 10/2021, now tolerating PO diet. Ultimately was trach and discharged to rehab to get stronger. Patient returned from rehab for trach decannulation, which was removed on 12/2. He was tachycardic and hyponatremia from hypovolemia which has improved with IV fluid. He is stable for discharge but unfortunately his only support (sister) is out of town and we are awaiting her arrival before he can be safely discharged.    OK to transfer patient from [facility] for medicine team. HF will continue to follow along. SW assistance greatly appreciated.

## 2021-12-04 NOTE — PROGRESS NOTE ADULT - PROBLEM SELECTOR PLAN 2
- continue LVAD speed at 9200 rpm  - Continue ASA 81 mg PO QD   - Remains off Coumadin due to GI bleeding   - Remains on Cipro 500 mg BID for suppressive antibiotics.

## 2021-12-04 NOTE — PROGRESS NOTE ADULT - ASSESSMENT
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 coli UTIs, driveline drainage (1/7/21) and COVID-19 (back in April 2020)  He was recently seen in clinic where he complained of abdominal pain and dark stools w constipation back in May. He presents to [facility] ER today weakness and fatigue, moderate and + Black stools for three days, on coumadin secondary to warfarin use in the setting of an LVAD. Patient has required transfusions for GIB in the past. Mostly recently back in jan 2021 pt had anemia with dark stools. No interventions was done at that time. However Last Endoscopy was done in July 2020 (negative). Today labs show patient is anemic with H/H of 4.5/16.3,. INR is 8.84 MAP in the 90s, Temp 35.1. He denies any chest pain, shortness of breath, dizziness, abd pain, nausea or vomiting.       (14 Jun 2021 16:57)  Surgery/Hospital Course:  6/14 admit for melena w/ anemia, INR 8.84   6/15 Capsul study (+) for small bowel bleed, balloon endoscopy (old blood in prox ileum); post EGD - septic w/ L opacity, re-intubated for concern for aspiration, TTE (Mod MR, decrease biV w/ interventricular septum boweing towards R)  6/17 bronch   6/20 +C Diff   6/22 CT C/A/P: Fluid filled colon which may be 2/2 rapid transit. Small bilateral pleffs with associates. Compressive atelectasis New ISABELLE & LLL  parenchymal opacities, suspicious for pneumonia. Moderate stenosis in the proximal superior mesenteric artery.   6/25 #8 Shiley trach at bedside   7/1 LVAD speed increased to 9200  7/2 Bronch  7/20 TC since 7/7. Patient transferred to SDU.   7/23 INR today 2.64.  H&H 7.3/24 this AM.  Will repeat CBC at noon, and will send stool guaiac Patient with persistent abdominal tenderness, rate of tube feeds decreased.  No nausea/vomiting.    7/24 INR today 2.4. H&H 9.1/28.6 low flow overnight /N&V, refusing Tube feeds on D5 1/2 normal  @50 cc/hr  7/25 INR 2.69  H&H 7.7/25.1 refusing Tube feeds on D5 1/2 normal  @50 cc/hr. This am + BM Melena Dr Oneill HF  aware- PRBC x1  GI team consulted -  NPO  plan on study in am-  D/w Dr Cadet Patient  to return to CTU for further management; 1PRBCS   7/27 Post op INR 2.2 today.  No bleeding. BC + for SM.  Pt is hypotensive requiring pressor and inotropic support.  ID follow up today on Cefepime will follow.  7/26 R PTC for PTX   7/28 CT C/A/P: sub q emphysema in R chest wall, GGO RUL, small ascites CTH negative; Abd US: GB thickening, pericholecystic fluid    7/30 perc choley  7/31 Perchole drain in place continues to drain total output overnight 133.  Fever today 38.8   8/1 duplex LE negative   8/7 Patient with persistent abdominal pain, refusing tube feeds and medications, Psych consulted  8/13 CTA A/P ordered to r/o mesenteric ischemia 2/2 persistent anorexia, nausea, vomiting. Revealed:  Evaluation of the mesenteric vessels is limited by streak artifact from LVAD. There appears to be severe stenosis of the proximal SMA; abdominal mesenteric doppler is recommended for further evaluation. 2.  No small bowel findings to suggest acute mesenteric ischemia. 3.  Focal dissections involving the right and left common iliac arteries.  8/15: Cultures resulted BC 1/2 +GPC in clusters, SC enterobacter; mesenteric duplex: borderline stenosis of proximal SMA  8/27: CT C/A/P noncon: Nondular opacities in R lung apex w/cavitation, abd nl  8/31:  Continue current care, treatment of thyrotoxicosis with medications as per endocrine, d/c ABX as per team.   9/8 RUQ sono: Contracted gallbladder with cholecystostomy tube in place.  9/10 failed SMA stent, c/b RP bleed s/p 3U PRCB  9/12 CTA Abd/Pelvis L RP hematoma   9/24 Trach decannulated   9/26 intubated fro resp distress for increased WOB, LL pneumonia; CT C/A/P: progressive ISABELLE opacities and L consolidations, multifocal pneumonia   9/30 TTE (EF 25%, decreased RV, mod MR)   10/3 VT -> Lidocaine gtt   10/4 Trach size 6 DCT cuff  10/5 CT abd (neg for intra-abd abcess, severe stenosis of prox SMA and b/l iliac arteries)  10/18 SMA Stent   10/23 Emend for nausea   10/24 +Occult  10/29 FEES, RUQ ascites and pericholecystic fluid   Theckened liqwuids puree diet- calorie ct    Today:  Ambulation, TC, and PO diet as tolerated.  Day 2 of calorie count  Discussions yesterday as to discharge planning, agreeable to go to [facility] however will need to be decannulated and NGT removed.  Patient candidate for SDU.   11/4 Transferred to sdu.  Must be oob - chair for all meals.  Eventual change to uncuffed trach.  11/5 VSS; rsr/ st ; changed trach to #6 shiley cuffless this am by ENT- tolerated well; continue nutritional support with kaofeed; h/h stable 8/26 today   DNR rescinded today as per CHF/ LVAD team   11/6 VSS: pt tolerating supervised po diet; shantelle count in progress; trach care/suctioning q 3 hours; increase hydral 20 q8 as per CHF/LVAD team  11/7 OOB to chair, VVS; karrieofvenkat w/ shantelle count  dawn drain    ald 25 qd  added by HF,  no LVAD alarms  11/8 sluggish today, refusing meds.    Plan to hold TF and increase PO intake. If adequate intake plan to d/c ngt  11/9 Cont off TF, calorie count .  11/10 The patient is non compliant today, not stating reasons why Secretions , suction q2-3 Calorie count, remains off TF  11/11 Pending placement at [facility]. Pt was assessed for diet upgrade without PMV in place.   Easy to chew texture diet with Thin liquids via SMALL SINGLE SIPS. Maintain upright posture during/after eating for 30 mins; oral hygiene; position upright (90 degrees); small sips/bites. 100% supervision  11/12 Difficulty placing at [facility] , may  now work towards decannulation, PMV trials at bedside.  'Encourage PO intake.  11/13 VSS pt ate 100% dinner last evening per nsg staff. will cap during the day as goal is to eventually decannulate pt.  11/14 VSS - SW requesting COVID swab for possible d/c to rehab this week  11/16 vss - accepted to [facility] - await available bed & insurance authorization from [facility].    12/2 Readmitted for trach removal. Respiratory status stable. IVF for low map and overall fluid deficit   12/3 stable for d/c. Family unable to take patient.   12/4 VVS; LVAF with no flow alarms.  CHF following. Patient medically cleated for discharge home.  Social work Venkatesh unable to get in contact with family. Transportation rescheduled for tomorrow afternoon.

## 2021-12-04 NOTE — PROGRESS NOTE ADULT - SUBJECTIVE AND OBJECTIVE BOX
VITAL SIGNS    Subjective: "I'm ok." Denies CP, palpitation, SOB, PEÑA, HA, dizziness, N/V/D, fever or chills.  No acute event noted overnight.     Telemetry:  NSR      Vital Signs Last 24 Hrs  T(C): 36.4 (21 @ 10:00), Max: 36.8 (21 @ 19:26)  T(F): 97.6 (21 @ 10:00), Max: 98.3 (21 @ 19:26)  HR: 91 (21 @ 10:00) (91 - 110)  BP: 86/62 (21 @ 10:00) (86/62 - 94/62)  RR: 18 (21 @ 10:00) (18 - 18)  SpO2: 100% (21 @ 10:00) (98% - 100%)            @ 07:01  -   @ 07:00  --------------------------------------------------------  IN: 1400 mL / OUT: 1580 mL / NET: -180 mL     @ 07:01  -   @ 15:49  --------------------------------------------------------  IN: 480 mL / OUT: 550 mL / NET: -70 mL    Daily     Daily Weight in k.5 (04 Dec 2021 05:00)    PHYSICAL EXAM    Neurology: alert and oriented x 3, nonfocal, no gross deficits    HEENT: Tracheal stoma with DSD with Tegaderm      CV: (+) LVAD Hum     Lungs: CTA B/L     Abdomen: soft, nontender, nondistended, positive bowel sounds, (+) Flatus; (+) BM; RLQ Driveline with occlusive dressing C/D/I. RUQ Choley drain --> SD bag.      :  Voiding               Extremities:  B/L LE (-) edema; negative calf tenderness; (+) 2 DP palpable        aspirin chewable 81 milliGRAM(s) Oral daily  ciprofloxacin Tablet 500 milliGRAM(s) Oral every 12 hours  hydrALAZINE 25 milliGRAM(s) Oral every 8 hours  influenza  Vaccine (HIGH DOSE) 0.7 milliLiter(s) IntraMuscular once  lactobacillus acidophilus 1 Tablet(s) Oral daily  magnesium oxide 400 milliGRAM(s) Oral every 12 hours  methimazole 10 milliGRAM(s) Oral daily  metoclopramide Syrup 5 milliGRAM(s) Oral three times a day before meals  metoprolol succinate ER 25 milliGRAM(s) Oral daily  mirtazapine 7.5 milliGRAM(s) Oral at bedtime  multivitamin 1 Tablet(s) Oral daily  pantoprazole Tablet 40 milliGRAM(s) Oral two times a day  sertraline 100 milliGRAM(s) Oral daily  spironolactone 25 milliGRAM(s) Oral daily  vancomycin  Solution 125 milliGRAM(s) Oral every 12 hours    Physical Therapy Rec:   Home  [  ]   Home w/ PT  [ X ]  Rehab  [  ]    Discussed with Cardiothoracic Team at AM rounds.

## 2021-12-04 NOTE — PROGRESS NOTE ADULT - SUBJECTIVE AND OBJECTIVE BOX
Subjective:  No overnight events    Medications:  aspirin  chewable 81 milliGRAM(s) Oral daily  ciprofloxacin     Tablet 500 milliGRAM(s) Oral every 12 hours  hydrALAZINE 25 milliGRAM(s) Oral every 8 hours  influenza  Vaccine (HIGH DOSE) 0.7 milliLiter(s) IntraMuscular once  lactobacillus acidophilus 1 Tablet(s) Oral daily  magnesium oxide 400 milliGRAM(s) Oral every 12 hours  methimazole 10 milliGRAM(s) Oral daily  metoclopramide   Syrup 5 milliGRAM(s) Oral three times a day before meals  metoprolol succinate ER 25 milliGRAM(s) Oral daily  mirtazapine 7.5 milliGRAM(s) Oral at bedtime  multivitamin 1 Tablet(s) Oral daily  pantoprazole    Tablet 40 milliGRAM(s) Oral two times a day  sertraline 100 milliGRAM(s) Oral daily  spironolactone 25 milliGRAM(s) Oral daily  vancomycin    Solution 125 milliGRAM(s) Oral every 12 hours    Vitals:  T(C): 36.4 (21 @ 10:00), Max: 36.8 (21 @ 19:26)  HR: 91 (21 @ 10:00) (91 - 110)  BP: 86/62 (21 @ 10:00) (86/62 - 94/62)  BP(mean): 70 (21 @ 10:00) (68 - 75)  RR: 18 (21 @ 10:00) (18 - 18)  SpO2: 100% (21 @ 10:00) (98% - 100%)    Daily     Daily Weight in k.5 (04 Dec 2021 05:00)        I&O's Summary    03 Dec 2021 07:01  -  04 Dec 2021 07:00  --------------------------------------------------------  IN: 1400 mL / OUT: 1580 mL / NET: -180 mL    04 Dec 2021 07:01  -  04 Dec 2021 14:02  --------------------------------------------------------  IN: 240 mL / OUT: 400 mL / NET: -160 mL        Physical Exam:  General: No distress. Comfortable.  HEENT: EOM intact.  Neck: Neck supple. JVP not elevated.   Chest: Clear to auscultation bilaterally  CV: LVAD hums   Abdomen: Soft, non-distended, non-tender  Skin: No rashes or skin breakdown  Neurology: Alert and oriented times three.    LVAD Interrogation  VAD TYPE HM 2  Speed 9200  Flow 5.3  Power 5.4  PI  5.4  Assessment of driveline exit site: driveline dressing c/.d/i  Programming changes: no changes made      Labs:                        11.0   7.55  )-----------( 217      ( 04 Dec 2021 07:29 )             33.9     12-04    133<L>  |  x   |  x   ----------------------------<  x   x    |  x   |  0.60    Ca    9.0      02 Dec 2021 16:58    TPro  x   /  Alb  x   /  TBili  0.3  /  DBili  x   /  AST  x   /  ALT  x   /  AlkPhos  x   1204      PT/INR - ( 04 Dec 2021 07:06 )   PT: 11.5 sec;   INR: 0.96 ratio         PTT - ( 03 Dec 2021 06:40 )  PTT:29.9 sec          Lactate Dehydrogenase, Serum: 240 U/L ( @ 06:40)

## 2021-12-05 NOTE — PROGRESS NOTE ADULT - ASSESSMENT
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 coli UTIs, driveline drainage (1/7/21) and COVID-19 (back in April 2020)  He was recently seen in clinic where he complained of abdominal pain and dark stools w constipation back in May. He presents to [facility] ER today weakness and fatigue, moderate and + Black stools for three days, on coumadin secondary to warfarin use in the setting of an LVAD. Patient has required transfusions for GIB in the past. Mostly recently back in jan 2021 pt had anemia with dark stools. No interventions was done at that time. However Last Endoscopy was done in July 2020 (negative). Today labs show patient is anemic with H/H of 4.5/16.3,. INR is 8.84 MAP in the 90s, Temp 35.1. He denies any chest pain, shortness of breath, dizziness, abd pain, nausea or vomiting.       (14 Jun 2021 16:57)  Surgery/Hospital Course:  6/14 admit for melena w/ anemia, INR 8.84   6/15 Capsul study (+) for small bowel bleed, balloon endoscopy (old blood in prox ileum); post EGD - septic w/ L opacity, re-intubated for concern for aspiration, TTE (Mod MR, decrease biV w/ interventricular septum boweing towards R)  6/17 bronch   6/20 +C Diff   6/22 CT C/A/P: Fluid filled colon which may be 2/2 rapid transit. Small bilateral pleffs with associates. Compressive atelectasis New ISABELLE & LLL  parenchymal opacities, suspicious for pneumonia. Moderate stenosis in the proximal superior mesenteric artery.   6/25 #8 Shiley trach at bedside   7/1 LVAD speed increased to 9200  7/2 Bronch  7/20 TC since 7/7. Patient transferred to SDU.   7/23 INR today 2.64.  H&H 7.3/24 this AM.  Will repeat CBC at noon, and will send stool guaiac Patient with persistent abdominal tenderness, rate of tube feeds decreased.  No nausea/vomiting.    7/24 INR today 2.4. H&H 9.1/28.6 low flow overnight /N&V, refusing Tube feeds on D5 1/2 normal  @50 cc/hr  7/25 INR 2.69  H&H 7.7/25.1 refusing Tube feeds on D5 1/2 normal  @50 cc/hr. This am + BM Melena Dr Oneill HF  aware- PRBC x1  GI team consulted -  NPO  plan on study in am-  D/w Dr Cadet Patient  to return to CTU for further management; 1PRBCS   7/27 Post op INR 2.2 today.  No bleeding. BC + for SM.  Pt is hypotensive requiring pressor and inotropic support.  ID follow up today on Cefepime will follow.  7/26 R PTC for PTX   7/28 CT C/A/P: sub q emphysema in R chest wall, GGO RUL, small ascites CTH negative; Abd US: GB thickening, pericholecystic fluid    7/30 perc choley  7/31 Perchole drain in place continues to drain total output overnight 133.  Fever today 38.8   8/1 duplex LE negative   8/7 Patient with persistent abdominal pain, refusing tube feeds and medications, Psych consulted  8/13 CTA A/P ordered to r/o mesenteric ischemia 2/2 persistent anorexia, nausea, vomiting. Revealed:  Evaluation of the mesenteric vessels is limited by streak artifact from LVAD. There appears to be severe stenosis of the proximal SMA; abdominal mesenteric doppler is recommended for further evaluation. 2.  No small bowel findings to suggest acute mesenteric ischemia. 3.  Focal dissections involving the right and left common iliac arteries.  8/15: Cultures resulted BC 1/2 +GPC in clusters, SC enterobacter; mesenteric duplex: borderline stenosis of proximal SMA  8/27: CT C/A/P noncon: Nondular opacities in R lung apex w/cavitation, abd nl  8/31:  Continue current care, treatment of thyrotoxicosis with medications as per endocrine, d/c ABX as per team.   9/8 RUQ sono: Contracted gallbladder with cholecystostomy tube in place.  9/10 failed SMA stent, c/b RP bleed s/p 3U PRCB  9/12 CTA Abd/Pelvis L RP hematoma   9/24 Trach decannulated   9/26 intubated fro resp distress for increased WOB, LL pneumonia; CT C/A/P: progressive ISABELLE opacities and L consolidations, multifocal pneumonia   9/30 TTE (EF 25%, decreased RV, mod MR)   10/3 VT -> Lidocaine gtt   10/4 Trach size 6 DCT cuff  10/5 CT abd (neg for intra-abd abcess, severe stenosis of prox SMA and b/l iliac arteries)  10/18 SMA Stent   10/23 Emend for nausea   10/24 +Occult  10/29 FEES, RUQ ascites and pericholecystic fluid   Theckened liqwuids puree diet- calorie ct    Today:  Ambulation, TC, and PO diet as tolerated.  Day 2 of calorie count  Discussions yesterday as to discharge planning, agreeable to go to [facility] however will need to be decannulated and NGT removed.  Patient candidate for SDU.   11/4 Transferred to sdu.  Must be oob - chair for all meals.  Eventual change to uncuffed trach.  11/5 VSS; rsr/ st ; changed trach to #6 shiley cuffless this am by ENT- tolerated well; continue nutritional support with kaofeed; h/h stable 8/26 today   DNR rescinded today as per CHF/ LVAD team   11/6 VSS: pt tolerating supervised po diet; shantelle count in progress; trach care/suctioning q 3 hours; increase hydral 20 q8 as per CHF/LVAD team  11/7 OOB to chair, VVS; karrieofvenkat w/ shantelle count  dawn drain    ald 25 qd  added by HF,  no LVAD alarms  11/8 sluggish today, refusing meds.    Plan to hold TF and increase PO intake. If adequate intake plan to d/c ngt  11/9 Cont off TF, calorie count .  11/10 The patient is non compliant today, not stating reasons why Secretions , suction q2-3 Calorie count, remains off TF  11/11 Pending placement at [facility]. Pt was assessed for diet upgrade without PMV in place.   Easy to chew texture diet with Thin liquids via SMALL SINGLE SIPS. Maintain upright posture during/after eating for 30 mins; oral hygiene; position upright (90 degrees); small sips/bites. 100% supervision  11/12 Difficulty placing at [facility] , may  now work towards decannulation, PMV trials at bedside.  'Encourage PO intake.  11/13 VSS pt ate 100% dinner last evening per nsg staff. will cap during the day as goal is to eventually decannulate pt.  11/14 VSS - SW requesting COVID swab for possible d/c to rehab this week  11/16 vss - accepted to [facility] - await available bed & insurance authorization from [facility].    12/2 Readmitted for trach removal. Respiratory status stable. IVF for low map and overall fluid deficit   12/3 stable for d/c. Family unable to take patient.   12/4 VVS; LVAF with no flow alarms.  CHF following. Patient medically cleated for discharge home.  Social work Venkatesh unable to get in contact with family. Transportation rescheduled for tomorrow afternoon.    12/5 Discharge cancelled secondary to absence of home care assistance. Despite multiple attempts to contact family via phone, there has been no successful correspondence with patient's emergency contacts. Toprol increased to 50mg qd for episodes of WCT

## 2021-12-05 NOTE — PROGRESS NOTE ADULT - SUBJECTIVE AND OBJECTIVE BOX
VITAL SIGNS    Telemetry:  ST   Vital Signs Last 24 Hrs  T(C): 36.6 (12-05-21 @ 11:56), Max: 36.8 (12-05-21 @ 01:28)  T(F): 97.9 (12-05-21 @ 11:56), Max: 98.3 (12-05-21 @ 04:00)  HR: 100 (12-05-21 @ 11:56) (92 - 110)  BP: 90/66 (12-05-21 @ 05:36) (83/61 - 94/62)  RR: 18 (12-05-21 @ 11:56) (18 - 18)  SpO2: 98% (12-05-21 @ 11:56) (97% - 99%)            12-04 @ 07:01  -  12-05 @ 07:00  --------------------------------------------------------  IN: 930 mL / OUT: 995 mL / NET: -65 mL    12-05 @ 07:01  -  12-05 @ 15:12  --------------------------------------------------------  IN: 320 mL / OUT: 470 mL / NET: -150 mL       Daily     Daily   Admit Wt: Drug Dosing Weight  Height (cm): 165.1 (02 Dec 2021 13:55)  Weight (kg): 53.6 (02 Dec 2021 13:55)  BMI (kg/m2): 19.7 (02 Dec 2021 13:55)  BSA (m2): 1.58 (02 Dec 2021 13:55)      CAPILLARY BLOOD GLUCOSE              MEDICATIONS  aspirin  chewable 81 milliGRAM(s) Oral daily  ciprofloxacin     Tablet 500 milliGRAM(s) Oral every 12 hours  hydrALAZINE 25 milliGRAM(s) Oral every 8 hours  influenza  Vaccine (HIGH DOSE) 0.7 milliLiter(s) IntraMuscular once  lactobacillus acidophilus 1 Tablet(s) Oral daily  magnesium oxide 400 milliGRAM(s) Oral every 12 hours  methimazole 10 milliGRAM(s) Oral daily  metoclopramide   Syrup 5 milliGRAM(s) Oral three times a day before meals  mirtazapine 7.5 milliGRAM(s) Oral at bedtime  multivitamin 1 Tablet(s) Oral daily  pantoprazole    Tablet 40 milliGRAM(s) Oral two times a day  sertraline 100 milliGRAM(s) Oral daily  spironolactone 25 milliGRAM(s) Oral daily  vancomycin    Solution 125 milliGRAM(s) Oral every 12 hours      >>> <<<  PHYSICAL EXAM  Subjective: NAD  Neurology: alert and oriented x 3, nonfocal, no gross deficits  CV :lvad trach site occluded   Lungs:CTA  Abdomen: soft, NT,ND, (+ )BM  :  voiding  Extremities: -c/c/e  driveline c/d/i      LABS  12-05    135  |  100  |  18  ----------------------------<  113<H>  4.0   |  21<L>  |  0.65    Ca    9.6      05 Dec 2021 05:08    TPro  x   /  Alb  x   /  TBili  0.3  /  DBili  x   /  AST  x   /  ALT  x   /  AlkPhos  x   12-04                                 10.8   6.88  )-----------( 234      ( 05 Dec 2021 05:08 )             33.3          PT/INR - ( 04 Dec 2021 07:06 )   PT: 11.5 sec;   INR: 0.96 ratio                PAST MEDICAL & SURGICAL HISTORY:  CHF (congestive heart failure)    CAD (coronary artery disease)    Depression    Pleural effusion    History of 2019 novel coronavirus disease (COVID-19)  april 2020    Hemorrhoids    Bleeding hemorrhoids    Peripheral arterial disease    Claudication    BPH with urinary obstruction    ACC/AHA stage D systolic heart failure    Anticoagulation goal of INR 2.0 to 2.5    Falls    Clavicle fracture    CKD (chronic kidney disease), stage III    Iron deficiency anemia    H/O epistaxis    Vertigo    GI bleed    S/P TVR (tricuspid valve repair)    S/P ventricular assist device    S/P endoscopy    H/O tracheostomy

## 2021-12-06 NOTE — PROGRESS NOTE ADULT - PROBLEM SELECTOR PLAN 4
- improved after IVF  - encourage increased PO intake
- improved after IVF  - encourage increased PO intake

## 2021-12-06 NOTE — DISCHARGE NOTE PROVIDER - NSDCCPCAREPLAN_GEN_ALL_CORE_FT
PRINCIPAL DISCHARGE DIAGNOSIS  Diagnosis: LVAD (left ventricular assist device) present  Assessment and Plan of Treatment:       SECONDARY DISCHARGE DIAGNOSES  Diagnosis: H/O tracheostomy  Assessment and Plan of Treatment:

## 2021-12-06 NOTE — DISCHARGE NOTE PROVIDER - NSDCFUADDINST_GEN_ALL_CORE_FT
Follow up with LVAD coordinator once discharged from rehab facility  Please call LVAD coordinator with results  [phone]    1. Change the percutaneous lead exit site dressing as instructed using sterile technique.  2. Inspect the percutaneous lead exit during dressing change for signs and symtoms of infection such as redness, swelling, discharge, odor or warmth.  3.Call MD for temperature greater than 100.5 degrees or below 96 degrees  4. Record Blood pressure, LVAD speed, power, PI and flow daily  5. Avoid twisting or kinks in percutaneous lead.  6. Inspect the percutaneous lead connection to  and make sure the percutaneous lock is in the locked position.  DO NOT submerge yourself in water  MRI's are not permitted  7. Showers are allowed only after approved by your medical provider.  Do not take a shower without using the Heart Mate [employer] shower bag to protect your equipment from the water damage. Follow up with LVAD coordinator once discharged from rehab facility  Please call LVAD coordinator with results  [phone]    1. Change the percutaneous lead exit site dressing as instructed using sterile technique.  2. Inspect the percutaneous lead exit during dressing change for signs and symtoms of infection such as redness, swelling, discharge, odor or warmth.  3.Call MD for temperature greater than 100.5 degrees or below 96 degrees  4. Record Blood pressure, LVAD speed, power, PI and flow daily  5. Avoid twisting or kinks in percutaneous lead.  6. Inspect the percutaneous lead connection to  and make sure the percutaneous lock is in the locked position.  DO NOT submerge yourself in water  7. Drain biliary drain every 12 hours. Visiting nurse to flush with 15ml normal saline three times a week.  MRI's are not permitted  7. Showers are allowed only after approved by your medical provider.  Do not take a shower without using the Heart Mate [employer] shower bag to protect your equipment from the water damage.

## 2021-12-06 NOTE — DISCHARGE NOTE PROVIDER - HOSPITAL COURSE
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 coli UTIs, driveline drainage (1/7/21) and COVID-19 (back in April 2020)  He was recently seen in clinic where he complained of abdominal pain and dark stools w constipation back in May. He presents to [facility] ER today weakness and fatigue, moderate and + Black stools for three days, on coumadin secondary to warfarin use in the setting of an LVAD. Patient has required transfusions for GIB in the past. Mostly recently back in jan 2021 pt had anemia with dark stools. No interventions was done at that time. However Last Endoscopy was done in July 2020 (negative). Today labs show patient is anemic with H/H of 4.5/16.3,. INR is 8.84 MAP in the 90s, Temp 35.1. He denies any chest pain, shortness of breath, dizziness, abd pain, nausea or vomiting.       (14 Jun 2021 16:57)  Surgery/Hospital Course:  6/14 admit for melena w/ anemia, INR 8.84   6/15 Capsul study (+) for small bowel bleed, balloon endoscopy (old blood in prox ileum); post EGD - septic w/ L opacity, re-intubated for concern for aspiration, TTE (Mod MR, decrease biV w/ interventricular septum boweing towards R)  6/17 bronch   6/20 +C Diff   6/22 CT C/A/P: Fluid filled colon which may be 2/2 rapid transit. Small bilateral pleffs with associates. Compressive atelectasis New ISABELLE & LLL  parenchymal opacities, suspicious for pneumonia. Moderate stenosis in the proximal superior mesenteric artery.   6/25 #8 Shiley trach at bedside   7/1 LVAD speed increased to 9200  7/2 Bronch  7/20 TC since 7/7. Patient transferred to SDU.   7/23 INR today 2.64.  H&H 7.3/24 this AM.  Will repeat CBC at noon, and will send stool guaiac Patient with persistent abdominal tenderness, rate of tube feeds decreased.  No nausea/vomiting.    7/24 INR today 2.4. H&H 9.1/28.6 low flow overnight /N&V, refusing Tube feeds on D5 1/2 normal  @50 cc/hr  7/25 INR 2.69  H&H 7.7/25.1 refusing Tube feeds on D5 1/2 normal  @50 cc/hr. This am + BM Melena Dr Oneill HF  aware- PRBC x1  GI team consulted -  NPO  plan on study in am-  D/w Dr Cadet Patient  to return to CTU for further management; 1PRBCS   7/27 Post op INR 2.2 today.  No bleeding. BC + for SM.  Pt is hypotensive requiring pressor and inotropic support.  ID follow up today on Cefepime will follow.  7/26 R PTC for PTX   7/28 CT C/A/P: sub q emphysema in R chest wall, GGO RUL, small ascites CTH negative; Abd US: GB thickening, pericholecystic fluid    7/30 perc choley  7/31 Perchole drain in place continues to drain total output overnight 133.  Fever today 38.8   8/1 duplex LE negative   8/7 Patient with persistent abdominal pain, refusing tube feeds and medications, Psych consulted  8/13 CTA A/P ordered to r/o mesenteric ischemia 2/2 persistent anorexia, nausea, vomiting. Revealed:  Evaluation of the mesenteric vessels is limited by streak artifact from LVAD. There appears to be severe stenosis of the proximal SMA; abdominal mesenteric doppler is recommended for further evaluation. 2.  No small bowel findings to suggest acute mesenteric ischemia. 3.  Focal dissections involving the right and left common iliac arteries.  8/15: Cultures resulted BC 1/2 +GPC in clusters, SC enterobacter; mesenteric duplex: borderline stenosis of proximal SMA  8/27: CT C/A/P noncon: Nondular opacities in R lung apex w/cavitation, abd nl  8/31:  Continue current care, treatment of thyrotoxicosis with medications as per endocrine, d/c ABX as per team.   9/8 RUQ sono: Contracted gallbladder with cholecystostomy tube in place.  9/10 failed SMA stent, c/b RP bleed s/p 3U PRCB  9/12 CTA Abd/Pelvis L RP hematoma   9/24 Trach decannulated   9/26 intubated fro resp distress for increased WOB, LL pneumonia; CT C/A/P: progressive ISABELLE opacities and L consolidations, multifocal pneumonia   9/30 TTE (EF 25%, decreased RV, mod MR)   10/3 VT -> Lidocaine gtt   10/4 Trach size 6 DCT cuff  10/5 CT abd (neg for intra-abd abcess, severe stenosis of prox SMA and b/l iliac arteries)  10/18 SMA Stent   10/23 Emend for nausea   10/24 +Occult  10/29 FEES, RUQ ascites and pericholecystic fluid   Theckened liqwuids puree diet- calorie ct    Today:  Ambulation, TC, and PO diet as tolerated.  Day 2 of calorie count  Discussions yesterday as to discharge planning, agreeable to go to [facility] however will need to be decannulated and NGT removed.  Patient candidate for SDU.   11/4 Transferred to sdu.  Must be oob - chair for all meals.  Eventual change to uncuffed trach.  11/5 VSS; rsr/ st ; changed trach to #6 shiley cuffless this am by ENT- tolerated well; continue nutritional support with kaofeed; h/h stable 8/26 today   DNR rescinded today as per CHF/ LVAD team   11/6 VSS: pt tolerating supervised po diet; shantelle count in progress; trach care/suctioning q 3 hours; increase hydral 20 q8 as per CHF/LVAD team  11/7 OOB to chair, VVS; karrieofvenkat w/ shantelle count  dawn drain    ald 25 qd  added by HF,  no LVAD alarms  11/8 sluggish today, refusing meds.    Plan to hold TF and increase PO intake. If adequate intake plan to d/c ngt  11/9 Cont off TF, calorie count .  11/10 The patient is non compliant today, not stating reasons why Secretions , suction q2-3 Calorie count, remains off TF  11/11 Pending placement at [facility]. Pt was assessed for diet upgrade without PMV in place.   Easy to chew texture diet with Thin liquids via SMALL SINGLE SIPS. Maintain upright posture during/after eating for 30 mins; oral hygiene; position upright (90 degrees); small sips/bites. 100% supervision  11/12 Difficulty placing at [facility] , may  now work towards decannulation, PMV trials at bedside.  'Encourage PO intake.  11/13 VSS pt ate 100% dinner last evening per nsg staff. will cap during the day as goal is to eventually decannulate pt.  11/14 VSS - SW requesting COVID swab for possible d/c to rehab this week  11/16 vss - accepted to [facility] - await available bed & insurance authorization from [facility].    12/2 Readmitted for trach removal. Respiratory status stable. IVF for low map and overall fluid deficit   12/3 stable for d/c. Family unable to take patient.   12/4 VVS; LVAF with no flow alarms.  CHF following. Patient medically cleated for discharge home.  Social work Venkatesh unable to get in contact with family. Transportation rescheduled for tomorrow afternoon.    12/5 Discharge cancelled secondary to absence of home care assistance. Despite multiple attempts to contact family via phone, there has been no successful correspondence with patient's emergency contacts. Toprol increased to 50mg qd for episodes of WCT  12/5 No further ectopy on telemetry. Pt discharged home today

## 2021-12-06 NOTE — PROGRESS NOTE ADULT - PROBLEM SELECTOR PROBLEM 2
R/O LVAD (left ventricular assist device) present
LVAD (left ventricular assist device) present
R/O LVAD (left ventricular assist device) present
R/O LVAD (left ventricular assist device) present
LVAD (left ventricular assist device) present

## 2021-12-06 NOTE — DISCHARGE NOTE PROVIDER - CARE PROVIDER_API CALL
Tamanna Mcclendon (NP; RN)  NP in Adult Health  [address] [place] [address]  Phone: [phone]  Fax: [phone]  Scheduled Appointment: 12/14/2021 09:00 AM

## 2021-12-06 NOTE — DISCHARGE NOTE PROVIDER - NSDCMRMEDTOKEN_GEN_ALL_CORE_FT
aspirin 81 mg oral tablet, chewable: 1 tab(s) orally once a day  chlorhexidine 2% topical pad: 1 application topically   ciprofloxacin 500 mg oral tablet: 1 tab(s) orally every 12 hours  hydrALAZINE 25 mg oral tablet: 1 tab(s) orally every 8 hours  lactobacillus acidophilus oral capsule:  orally   magnesium oxide 400 mg oral tablet: 1 tab(s) orally every 12 hours  methIMAzole 10 mg oral tablet: 1 tab(s) orally once a day  metoclopramide 5 mg/5 mL oral syrup: 5 milligram(s) orally 3 times a day (with meals)  metoprolol succinate 50 mg oral tablet, extended release: 1 tab(s) orally once a day  mirtazapine 7.5 mg oral tablet: 1 tab(s) orally once a day (at bedtime)  Multiple Vitamins oral tablet: 1 tab(s) orally once a day  pantoprazole 40 mg oral delayed release tablet: 1 tab(s) orally 2 times a day  sertraline 100 mg oral tablet: 1 tab(s) orally once a day  spironolactone 25 mg oral tablet: 1 tab(s) orally once a day  vancomycin 25 mg/mL oral liquid: 125 milligram(s) orally every 12 hours   chlorhexidine 2% topical pad: 1 application topically

## 2021-12-06 NOTE — PROGRESS NOTE ADULT - PROBLEM SELECTOR PROBLEM 1
H/O: GI bleed
H/O: GI bleed
CHF (congestive heart failure)
H/O: GI bleed
CHF (congestive heart failure)

## 2021-12-06 NOTE — PROGRESS NOTE ADULT - PROBLEM SELECTOR PLAN 3
Trach removal  Maintain stoma --> DSD C/D/I
- trach decannulated 12/2, saturating well on RA.
Trach removal  Maintain stoma --> DSD C/D/I
- trach decannulated 12/2, saturating well on RA.
Trach removal

## 2021-12-06 NOTE — PROGRESS NOTE ADULT - SUBJECTIVE AND OBJECTIVE BOX
Subjective: Patient seen and examined resting in bed.     Medications:  aspirin  chewable 81 milliGRAM(s) Oral daily  ciprofloxacin     Tablet 500 milliGRAM(s) Oral every 12 hours  hydrALAZINE 25 milliGRAM(s) Oral every 8 hours  influenza  Vaccine (HIGH DOSE) 0.7 milliLiter(s) IntraMuscular once  lactobacillus acidophilus 1 Tablet(s) Oral daily  magnesium oxide 400 milliGRAM(s) Oral every 12 hours  methimazole 10 milliGRAM(s) Oral daily  metoclopramide   Syrup 5 milliGRAM(s) Oral three times a day before meals  metoprolol succinate ER 50 milliGRAM(s) Oral daily  mirtazapine 7.5 milliGRAM(s) Oral at bedtime  multivitamin 1 Tablet(s) Oral daily  pantoprazole    Tablet 40 milliGRAM(s) Oral two times a day  sertraline 100 milliGRAM(s) Oral daily  spironolactone 25 milliGRAM(s) Oral daily  vancomycin    Solution 125 milliGRAM(s) Oral every 12 hours      Vitals:  Vital Signs Last 24 Hours  T(C): 36.3 (12-06-21 @ 04:17), Max: 36.7 (12-05-21 @ 13:30)  HR: 97 (12-06-21 @ 05:52) (97 - 108)  BP: 92/67 (12-06-21 @ 05:52) (83/59 - 92/67)  RR: 18 (12-06-21 @ 05:52) (18 - 18)  SpO2: 98% (12-06-21 @ 05:52) (96% - 99%)        I&O's Summary    05 Dec 2021 07:01  -  06 Dec 2021 07:00  --------------------------------------------------------  IN: 760 mL / OUT: 1170 mL / NET: -410 mL        Physical Exam  General: No distress. Comfortable.  Neck: Neck supple. JVP not elevated. No masses  Chest: Clear to auscultation bilaterally  CV: Normal S1 and S2. No murmurs, rub, or gallops. Radial pulses normal.  Abdomen: Soft, non-distended, non-tender  Neurology: Alert and oriented times three.     LVAD Interrogation  VAD TYPE HM 2  Speed 9200  Flow 4.5  Power 5.3  PI  5.3  Assessment of driveline exit site: driveline dressing c/d/i  Programming changes: no changes made    Labs:                        10.9   7.74  )-----------( 232      ( 06 Dec 2021 05:45 )             34.1     12-06    132<L>  |  97  |  15  ----------------------------<  93  3.8   |  21<L>  |  0.63    Ca    9.8      06 Dec 2021 05:45                Lactate Dehydrogenase, Serum: 251 U/L (12-06 @ 05:45)  Lactate Dehydrogenase, Serum: 254 U/L (12-05 @ 05:08)       Subjective: Patient seen and examined resting in bed.     Medications:  aspirin  chewable 81 milliGRAM(s) Oral daily  ciprofloxacin     Tablet 500 milliGRAM(s) Oral every 12 hours  hydrALAZINE 25 milliGRAM(s) Oral every 8 hours  influenza  Vaccine (HIGH DOSE) 0.7 milliLiter(s) IntraMuscular once  lactobacillus acidophilus 1 Tablet(s) Oral daily  magnesium oxide 400 milliGRAM(s) Oral every 12 hours  methimazole 10 milliGRAM(s) Oral daily  metoclopramide   Syrup 5 milliGRAM(s) Oral three times a day before meals  metoprolol succinate ER 50 milliGRAM(s) Oral daily  mirtazapine 7.5 milliGRAM(s) Oral at bedtime  multivitamin 1 Tablet(s) Oral daily  pantoprazole    Tablet 40 milliGRAM(s) Oral two times a day  sertraline 100 milliGRAM(s) Oral daily  spironolactone 25 milliGRAM(s) Oral daily  vancomycin    Solution 125 milliGRAM(s) Oral every 12 hours      Vitals:  Vital Signs Last 24 Hours  T(C): 36.3 (12-06-21 @ 04:17), Max: 36.7 (12-05-21 @ 13:30)  HR: 97 (12-06-21 @ 05:52) (97 - 108)  BP: 92/67 (12-06-21 @ 05:52) (83/59 - 92/67)  RR: 18 (12-06-21 @ 05:52) (18 - 18)  SpO2: 98% (12-06-21 @ 05:52) (96% - 99%)        I&O's Summary    05 Dec 2021 07:01  -  06 Dec 2021 07:00  --------------------------------------------------------  IN: 760 mL / OUT: 1170 mL / NET: -410 mL        Physical Exam  General: No distress. Comfortable.  Neck: Neck supple. JVP not elevated. No masses  Chest: Clear to auscultation bilaterally  CV: +VAD hum  Abdomen: Soft, non-distended, non-tender, +bilary drain in place  Neurology: Alert and oriented times three.     LVAD Interrogation  VAD TYPE HM 2  Speed 9200  Flow 4.5  Power 5.3  PI  5.3  Assessment of driveline exit site: driveline dressing c/d/i  Programming changes: no changes made  MPU was replaced, [facility]-[id number]    Labs:                        10.9   7.74  )-----------( 232      ( 06 Dec 2021 05:45 )             34.1     12-06    132<L>  |  97  |  15  ----------------------------<  93  3.8   |  21<L>  |  0.63    Ca    9.8      06 Dec 2021 05:45                Lactate Dehydrogenase, Serum: 251 U/L (12-06 @ 05:45)  Lactate Dehydrogenase, Serum: 254 U/L (12-05 @ 05:08)

## 2021-12-06 NOTE — PROGRESS NOTE ADULT - ASSESSMENT
67 YO M with a history of stage D NICM s/p HM2 on 9/2017 as DT (due to severe PAD) with TV ring, prior COVID-19 infection 4/2020, recurrent syncope post LVAD s/p ILR, and chronic abdominal pain was previously admitted and was noted to have a prolong hospital course (6/14-11/16). During that time he has multiple infections and now remains on suppressive antibiotics. Underwent SMA stent with Vascular in 10/2021, now tolerating PO diet. Ultimately was trach and discharged to rehab to get stronger. Patient returned from rehab for trach decannulation, which was removed on 12/2. He was tachycardic and hyponatremia from hypovolemia which has improved with IV fluid. He is stable for discharge but unfortunately his only support (sister) is out of town and we are awaiting her arrival before he can be safely discharged.    OK to transfer patient from [facility] for medicine team. HF will continue to follow along. SW assistance greatly appreciated.            65 YO M with a history of stage D NICM s/p HM2 on 9/2017 as DT (due to severe PAD) with TV ring, prior COVID-19 infection 4/2020, recurrent syncope post LVAD s/p ILR, and chronic abdominal pain was previously admitted and was noted to have a prolong hospital course (6/14-11/16). During that time he has multiple infections and now remains on suppressive antibiotics. Underwent SMA stent with Vascular in 10/2021, now tolerating PO diet. Ultimately was trach and discharged to rehab to get stronger. Patient returned from rehab for trach decannulation, which was removed on 12/2. He remains in SR/ST, MAPs are at goal and he is stable for discharge. Currently no signs of LVAD dysfunction

## 2021-12-06 NOTE — PROGRESS NOTE ADULT - PROBLEM SELECTOR PLAN 2
- continue LVAD speed at 9200 rpm  - Continue ASA 81 mg PO QD   - Remains off Coumadin due to GI bleeding   - Remains on Cipro 500 mg BID for suppressive antibiotics. - continue LVAD speed at 9200 rpm  - Continue ASA 81 mg PO QD   - Remains off Coumadin due to GI bleeding   - Remains on Cipro 500 mg BID for suppressive antibiotics.  - New MPU was given to patient prior to discharge.  [id number]  - Follow up appointment has been arranged for 12/14 @ 9am

## 2021-12-06 NOTE — PROGRESS NOTE ADULT - PROBLEM SELECTOR PLAN 1
- continue Hydralazine 25 mg PO TID, and Toprol XL 25 mg PO   - continue Aldactone 25 mg PO QD  - euvolemic off diuretics
protonix bid
Continue on Protonix 40 mg PO BID  Continue on ASA 81 mg PO daily only
Continue on Protonix 40 mg PO BID  Continue on ASA 81 mg PO daily only
- continue Hydralazine 25 mg PO TID, and Toprol XL 25 mg PO   - continue Aldactone 25 mg PO QD  - euvolemic off diuretics

## 2021-12-07 PROBLEM — F32.A DEPRESSION, UNSPECIFIED DEPRESSION TYPE: Status: ACTIVE | Noted: 2017-11-02

## 2021-12-07 PROBLEM — B99.9 CHRONIC INFECTION: Status: ACTIVE | Noted: 2021-01-01

## 2021-12-07 PROBLEM — E05.90 HYPERTHYROIDISM: Status: ACTIVE | Noted: 2021-01-01

## 2021-12-07 PROBLEM — R11.0 NAUSEA: Status: ACTIVE | Noted: 2021-01-01

## 2021-12-10 PROBLEM — I47.2 VENTRICULAR TACHYCARDIA: Status: ACTIVE | Noted: 2021-01-01

## 2021-12-13 NOTE — CONSULT NOTE ADULT - PROBLEM SELECTOR RECOMMENDATION 3
Will continue to f/u for symptoms.         Francisco Burgos MD   Geriatrics and Palliative Care (GAP) Consult Service    of Geriatric and Palliative Medicine  [facility]      Please page the following number for clinical matters between the hours of 9 am and 5 pm from Monday through Friday : [phone]    After 5pm and on weekends, please see the contact information below:    In the event of newly developing, evolving, or worsening symptoms, please contact the Palliative Medicine team via pager (if the patient is at [facility] #[phone] or if the patient is at [facility] #[phone]) The Geriatric and Palliative Medicine service has coverage 24 hours a day/ 7 days a week to provide medical recommendations regarding symptom management needs via telephone

## 2021-12-13 NOTE — H&P ADULT - NSHPLABSRESULTS_GEN_ALL_CORE
.  LABS:                         10.7   16.40 )-----------( 222      ( 13 Dec 2021 01:42 )             30.9     12-13    127<L>  |  91<L>  |  14  ----------------------------<  129<H>  5.5<H>   |  21<L>  |  0.64    Ca    9.1      13 Dec 2021 01:42  Phos  3.2     12  Mg     2.0         TPro  8.1  /  Alb  3.0<L>  /  TBili  0.6  /  DBili  x   /  AST  86<H>  /  ALT  24  /  AlkPhos  123<H>  12-    PT/INR - ( 13 Dec 2021 01:44 )   PT: 14.9 sec;   INR: 1.26 ratio         PTT - ( 13 Dec 2021 01:44 )  PTT:31.0 sec  Urinalysis Basic - ( 13 Dec 2021 02:27 )    Color: Yellow / Appearance: Clear / S.027 / pH: x  Gluc: x / Ketone: Small  / Bili: Negative / Urobili: Negative   Blood: x / Protein: 100 / Nitrite: Negative   Leuk Esterase: Negative / RBC: 1 /hpf / WBC 2 /HPF   Sq Epi: x / Non Sq Epi: 0 /hpf / Bacteria: Negative            RADIOLOGY, EKG & ADDITIONAL TESTS: Reviewed.

## 2021-12-13 NOTE — CONSULT NOTE ADULT - ATTENDING COMMENTS
66yrs. s/p HM2 Tv annuloplasty 2017. DT due to severe PAD.   COVID April 2020. Short admission. Patient refused vaccine.   Recurrent GI bleeds. Maintained off AC.   loop recorder sept 2020 for syncope  Admitted June 2021-Nov 2021  GI bleed. Decompensated during endoscopy. Long hospitalization including:  Vent and trach.   Severe deconditioning.  Perc dawn drain placed  Chronic abdominal pain due to SMA stenosis with malnutrition. First attempt at percutaneous SMA stenting failed and was complicated by RP bleed. Ultimately agreed to second attempt which was succuessful. Nutirition advanced successfully.   d/c to [facility] 11.16.   Readmitted 12.2-6 for trach decannulation. At that time, patient was ambulating. In positive spirits, asking when he could have cholecystectomy. Successful decannulation. d/c home in stable improved condition.   d/c: asa 81, HDZN 25 tid, toprol 50, ald 25 po Vanco, cipro 500 Q12. PPI, no amnio. reglan. methimazole. mertazapine, sertraline.   Patient was d/c without medication supply (? admin issue) and was 24 hours without medication.   Seen by EP on 12.10: interog of loop recorder showed 3 X NSVT 5-13 secs 176-200. restat amnio 200 QD.   over weekend deterioration. decreased intake. ? low BP ? elevated HR ? hypoxia.   taken to [facility] ED where he was found to be febrile to 103 and COVID PCR positive. 66yrs. s/p HM2 Tv annuloplasty 2017. DT due to severe PAD.   COVID April 2020. Short admission. Patient refused vaccine.   Recurrent GI bleeds. Maintained off AC.   loop recorder sept 2020 for syncope  Admitted June 2021-Nov 2021  GI bleed. Decompensated during endoscopy. Long hospitalization including:  Vent and trach.   Severe deconditioning.  Perc dawn drain placed  Chronic abdominal pain due to SMA stenosis with malnutrition. First attempt at percutaneous SMA stenting failed and was complicated by RP bleed. Ultimately agreed to second attempt which was succuessful. Nutirition advanced successfully.   d/c to [facility] 11.16.   Readmitted 12.2-6 for trach decannulation. At that time, patient was ambulating. In positive spirits, asking when he could have cholecystectomy. Successful decannulation. d/c home in stable improved condition.   d/c: asa 81, HDZN 25 tid, toprol 50, ald 25 po Vanco, cipro 500 Q12. PPI, no amnio. reglan. methimazole. mertazapine, sertraline.   Patient was d/c without medication supply (? admin issue) and was 24 hours without medication.   Seen by EP on 12.10: interog of loop recorder showed 3 X NSVT 5-13 secs 176-200. restat amnio 200 QD.   over weekend deterioration. decreased intake. ? low BP ? elevated HR ? hypoxia.   taken to [facility] ED where he was found to be febrile to 103 and COVID PCR positive.  transferred to 5ICU overnight:   MAP 65, 100-110, RR 30, 3L  250 albumin. increasing doses of av, plasmalyte 75/hr.   Ao X3.   no fever since admission.   meds; av, vanco, cefipime, PPI, monoclonal abs, lovanox s/c,   HR 80s, MAP 65-67, Afebrile. 3L  LVAD 9200, no alarms since admission. Flow 5.9, Power 5.4, PI 5.5   I/O; 200 every 2 hrs.   COVID PCR post. [place] > 250, neuclocapsid 88  12-13  127<L>  |  91<L>  |  14  ----------------------------<  129<H>  5.5<H>   |  21<L>  |  0.64    Ca    9.1      13 Dec 2021 01:42  Phos  3.2     12-13  Mg     2.0     12-13  TPro  8.1  /  Alb  3.0<L>  /  TBili  0.6  /  DBili  x   /  AST  86<H>  /  ALT  24  /  AlkPhos  123<H>  12-13       Ddimer 3655  Fibrinogen 668  procal 4.5   lactate .7,                         10.7   16.40 )-----------( 222      ( 13 Dec 2021 01:42 )             30.9   66yrs, complicated post VAD history with long hospitalization over the summer.   Notably, on d/c last week was in the best clinical shape that he had been for month vocalizing a wish for on going medical interventions including cholecystectomy. Now readmitted with acute decompensation, hypotensive, midly hypoxic, COVID PCR positive with elevated inflammatory biomarkers.   Patient is AOX3 and has stated this morning through a  that he wishes full code.   Plan;  Dr Prater to review. ? patient meets criteria for additional therapies for COVID including rendisovir/decadron .   place central line for measurement of CVP and CVP sat to adjust pressors/inotropes.   encourage nutrition.   continue outpatient PO meds including: amnio, ASA, methimazole  Zi Werner

## 2021-12-13 NOTE — CONSULT NOTE ADULT - ASSESSMENT
64yo man with a history of VAD palcement, history of COVID infeciton in April 2020, history of a prolonged hospitalization in 2021 with recurrent sepsis, pneumonia, intubated/trach x2 cycles , chronic gall bladder disease s/p perc dawn, SMA ischemia reuiring a stent placement, cachecxia who was discharged to rehab but is presented from [facility] on 12/13 with AMS, hypotensive, tachycardic found to be COVID (+). Pt is lethargic and mumbling words required ICU admission and pressor support.    More awake, alert, interactive   oxygen nasal requirements with reasonable saturation    COVID infection-   confirmed second bout of COVID by documented PCR testing  Ideally, would be interested in sequencing the virus to determine strain (omicron vs delta)  Received a dose of monoclonal antibodies Regeneron product earlier today  Refuses most vaccines- but will need to wait three months prior to COVID vaccine series post monoclonal  Will administer flu/pneumonia vaccines this admission  Send LDH, CRP,ESR, Ferritin  Will plan to start Remdesivir and dexamethasone as oxygen requirements are increasing  GOC conversation in progress  Repeat CXR    Leukocytosis:  History of recurrent pneumonia and bacteremias   With stenotrophomonas in prior sputum cultures and enterobacter in blood in October 2021  Please send blood cultures x2 sets    Morris Prater MD  [phone]  After 5pm/weekends [phone]

## 2021-12-13 NOTE — H&P ADULT - NSHPPHYSICALEXAM_GEN_ALL_CORE
PHYSICAL EXAM:  Constitutional: no distress, but lethergic and mubbling words   ENMT: trach site with gazue and tape. on 3lpm NC   Respiratory: no wheezing, clear    Cardiovascular: + hum, regular, HM2 in place   Gastrointestinal: non tender non distended, choley drain  Genitourinary: condom cath in place.   Extremities: - edema - tenderness  Neurological: a &o x2 (self and place)   Skin: cdi

## 2021-12-13 NOTE — PROGRESS NOTE ADULT - SUBJECTIVE AND OBJECTIVE BOX
RICKY HAMPTON  MRN-[id number]  Patient is a 66y old  Male who presents with a chief complaint of COVID (+) with AMS and hypotension (13 Dec 2021 11:21)    HPI:  65M PMH ACC/AHA stage D HF due to NICM HM2 LVAD , TV annuloplasty ring 17 as destination therapy due to severe peripheral artery disease with significant stenosis  SIADH, Depression, CKD-3 with hyperkalemia, past E. coli UTIs, driveline drainage (21) and COVID-19 (back in 2020). Recurrent syncope post LVAD s/p ILR, and chronic abdominal pain and was noted to have a prolong hospital course (-). During that time he has multiple infections and now remains on suppressive antibiotics. Underwent SMA stent with Vascular in 10/2021, now tolerating PO diet, perc dawn drain placement and mutiple GI bleeds. Ultimately was trach and discharged to rehab to get stronger. Patient returned from rehab for trach decannulation, which was removed on .    Now presented from [facility] on  with AMS, hypotensive, tachycardic found to be COVID (+). Pt is lethargic and mumbling words.      (13 Dec 2021 01:06)      Physical Exam:  Vital Signs Last 24 Hrs  T(C): 36.1 (13 Dec 2021 12:00), Max: 36.2 (13 Dec 2021 08:00)  T(F): 97 (13 Dec 2021 12:00), Max: 97.2 (13 Dec 2021 08:00)  HR: 82 (13 Dec 2021 12:30) (79 - 112)  BP: 80/48 (13 Dec 2021 01:00) (78/60 - 80/48)  BP(mean): 55 (13 Dec 2021 01:00) (55 - 65)  RR: 20 (13 Dec 2021 12:30) (17 - 42)  SpO2: 100% (13 Dec 2021 12:30) (86% - 100%)    Gen:  Awake, alert   CNS: non focal 	  Neck: no JVD  RES : clear , no wheezing    Chest:   +CTA                    CVS: Regular  rhythm. Normal S1/S2  Abd: Soft, non-distended. Bowel sounds present; Drive line site C&D  Skin: No rash.  Ext:  no edema, A Line    ============================I/O===========================   I&O's Detail    12 Dec 2021 07:  -  13 Dec 2021 07:00  --------------------------------------------------------  IN:    Albumin 5%  - 250 mL: 250 mL    IV PiggyBack: 600 mL    multiple electrolytes Injection Type 1.: 375 mL    Phenylephrine: 45.9 mL    sodium chloride 0.9%: 60 mL  Total IN: 1330.9 mL    OUT:    Drain (mL): 250 mL    Indwelling Catheter - Urethral (mL): 250 mL  Total OUT: 500 mL    Total NET: 830.9 mL      13 Dec 2021 07:01  -  13 Dec 2021 13:14  --------------------------------------------------------  IN:    multiple electrolytes Injection Type 1.: 375 mL    Phenylephrine: 58.5 mL  Total IN: 433.5 mL    OUT:    Drain (mL): 0 mL    Voided (mL): 200 mL  Total OUT: 200 mL    Total NET: 233.5 mL        ============================ LABS =========================                        10.7   16.40 )-----------( 222      ( 13 Dec 2021 01:42 )             30.9     12-13    127<L>  |  91<L>  |  14  ----------------------------<  129<H>  5.5<H>   |  21<L>  |  0.64    Ca    9.1      13 Dec 2021 01:42  Phos  3.2       Mg     2.0         TPro  8.1  /  Alb  3.0<L>  /  TBili  0.6  /  DBili  x   /  AST  86<H>  /  ALT  24  /  AlkPhos  123<H>      LIVER FUNCTIONS - ( 13 Dec 2021 01:42 )  Alb: 3.0 g/dL / Pro: 8.1 g/dL / ALK PHOS: 123 U/L / ALT: 24 U/L / AST: 86 U/L / GGT: x           PT/INR - ( 13 Dec 2021 01:44 )   PT: 14.9 sec;   INR: 1.26 ratio         PTT - ( 13 Dec 2021 01:44 )  PTT:31.0 sec  ABG - ( 13 Dec 2021 06:30 )  pH, Arterial: 7.38  pH, Blood: x     /  pCO2: 44    /  pO2: 167   / HCO3: 26    / Base Excess: 0.7   /  SaO2: 100.0       Urinalysis Basic - ( 13 Dec 2021 02:27 )    Color: Yellow / Appearance: Clear / S.027 / pH: x  Gluc: x / Ketone: Small  / Bili: Negative / Urobili: Negative   Blood: x / Protein: 100 / Nitrite: Negative   Leuk Esterase: Negative / RBC: 1 /hpf / WBC 2 /HPF   Sq Epi: x / Non Sq Epi: 0 /hpf / Bacteria: Negative    ======================Micro/Rad/Cardio=================  Culture: Reviewed   CXR: Reviewed  Echo:Reviewed  ======================================================  PAST MEDICAL & SURGICAL HISTORY:  CHF (congestive heart failure)    CAD (coronary artery disease)    Depression    Pleural effusion    History of 2019 novel coronavirus disease (COVID-19)  2020    Hemorrhoids    Bleeding hemorrhoids    Peripheral arterial disease    Claudication    BPH with urinary obstruction    ACC/AHA stage D systolic heart failure    Anticoagulation goal of INR 2.0 to 2.5    Falls    Clavicle fracture    CKD (chronic kidney disease), stage III    Iron deficiency anemia    H/O epistaxis    Vertigo    GI bleed    S/P TVR (tricuspid valve repair)    S/P ventricular assist device    S/P endoscopy    H/O tracheostomy    Assessment & Plan:    Patient has re-infection with COVID19, severe infection requiring nasal cannula    1. Start Dexamethasone  2. Start Remdesivir  3. Central line for invasive monitoring  4. IV phenylephrine to maintain MAP > 65 mm Hg, switch to IV Levophed once TLC in place  5. Lovenox 40 mg every 12 hours  6. Aspirin for HM-2  7. Discussed with heart failure and palliative care team    Patient requires continuous monitoring with bedside rhythm monitoring, arterial line, pulse oximetry, ventilator monitoring and intermittent blood gas analysis.  Care plan discussed with ICU care team. I spent 35 minutes of noncontinuous critical care management to this patient.

## 2021-12-13 NOTE — H&P ADULT - ASSESSMENT
65 YOM with a PMH ACC/AHA stage D HF due to NICM HM2 LVAD , TV annuloplasty ring 9/12/17 as destination therapy due to severe peripheral artery disease with significant stenosis, SIADH, Depression, CKD-3 with hyperkalemia, past E. coli UTIs, driveline drainage (1/7/21), COVID-19 (back in April 2020) and chronic abdominal pain. Pt with a recent prolong hospital course (6/14-11/16) with multiple infections and now remains on suppressive antibiotics, underwent SMA stent with Vascular in 10/2021, perc dawn drain placement, multiple GI bleeds and trach placement with decannulation on 12/2 then sent home on 12/6.  Pt transferred from [facility] in septic shock secondary to recurrent COVID infection.       Plan:  Neuro: Monitor neuro status. Pt is confused but arousable   Resp: on 3LPM, sat 100%. CXR (clear)   CV: HM2-RPM 9200, initially with high PI (8.0). Now s/p fluids and maintenance fluids started. PI 5.6 and flow 5.8. LVAD team called. Concern for thrombus? TTE ordered. off AC due to hx of GI bleeds. As of note (wire exposure on white power connecter to )- may need to be changed?   -hypotension MAP 60s. started Jeremy   GI: NPO, monitor dawn drain. on protonix BID for hx of gi bleed   Renal: Urology called for difficult Landrum placement. condom cath now on. monitor UOP. bladder scan prn   Endo: FS PRN  Heme: Lovenox 40qd. follow up COVID hypercoagulable labs.   ID: 2nd bout with COVID infection  -Per Dr. Prater will start monoclonal antibodies today. Will access need for decadron and remdesivir   -follow up covid Labs   -started on vanco, cefepime for emperic coverage   -was on cipro at home for hx of recurrent infections.       Above plan discussed with Dr. Santo, LVAD team and ID Dr. Prater

## 2021-12-13 NOTE — CONSULT NOTE ADULT - SUBJECTIVE AND OBJECTIVE BOX
HPI:  65M PMH ACC/AHA stage D HF due to NICM HM2 LVAD , TV annuloplasty ring 17 as destination therapy due to severe peripheral artery disease with significant stenosis  SIADH, Depression, CKD-3 with hyperkalemia, past E. coli UTIs, driveline drainage (21) and COVID-19 (back in 2020). Recurrent syncope post LVAD s/p ILR, and chronic abdominal pain and was noted to have a prolong hospital course (-). During that time he has multiple infections and now remains on suppressive antibiotics. Underwent SMA stent with Vascular in 10/2021, now tolerating PO diet, perc dawn drain placement and mutiple GI bleeds. Ultimately was trach and discharged to rehab to get stronger. Patient returned from rehab for trach decannulation, which was removed on .    Now presented from [facility] on  with AMS, hypotensive, tachycardic found to be COVID (+). Pt is lethargic and mumbling words.      (13 Dec 2021 01:06)    Off note, the patient was admitted between  and  then between  and  and note is readmitted for the reasons above. As per HF, the patient rescinded his code status and he is now full code. Palliative care was called for GOC.     PERTINENT PM/SXH:   No pertinent past medical history    CHF (congestive heart failure)    CAD (coronary artery disease)    Depression    Pleural effusion    History of  novel coronavirus disease (COVID-19)    Hemorrhoids    Bleeding hemorrhoids    Peripheral arterial disease    Claudication    BPH with urinary obstruction    ACC/AHA stage D systolic heart failure    Anticoagulation goal of INR 2.0 to 2.5    Falls    Clavicle fracture    CKD (chronic kidney disease), stage III    Iron deficiency anemia    H/O epistaxis    Vertigo    GI bleed      No significant past surgical history    S/P TVR (tricuspid valve repair)    S/P ventricular assist device    S/P endoscopy    H/O tracheostomy      FAMILY HISTORY:    ITEMS NOT CHECKED ARE NOT PRESENT    SOCIAL HISTORY:   Significant other/partner[ ]  Children[ ]  Anabaptism/Spirituality:  Substance hx:  [ ]   Tobacco hx:  [ ]   Alcohol hx: [ ]   Home Opioid hx:  [ ] I-Stop Reference No:  Living Situation: [ ]Home  [ ]Long term care  [ ]Rehab [ ]Other    ADVANCE DIRECTIVES:    DNR  MOLST  [ ]  Living Will  [ ]   DECISION MAKER(s):  [x ] Health Care Proxy(s)  [ ] Surrogate(s)  [ ] Guardian           Name(s): Phone Number(s): Alyssa Ogden [phone]    BASELINE (I)ADL(s) (prior to admission):  [place]: [ ]Total  [ ] Moderate [ ]Dependent    Allergies    Amiodarone Hydrochloride (Other (Severe))    Intolerances    MEDICATIONS  (STANDING):  cefepime   IVPB      cefepime   IVPB 1000 milliGRAM(s) IV Intermittent every 8 hours  enoxaparin Injectable 40 milliGRAM(s) SubCutaneous daily  multiple electrolytes Injection Type 1 1000 milliLiter(s) (75 mL/Hr) IV Continuous <Continuous>  pantoprazole  Injectable 40 milliGRAM(s) IV Push every 12 hours  phenylephrine    Infusion 0.3 MICROgram(s)/kG/Min (6.27 mL/Hr) IV Continuous <Continuous>  sodium chloride 0.9% lock flush 3 milliLiter(s) IV Push every 8 hours  vancomycin  IVPB 1000 milliGRAM(s) IV Intermittent every 12 hours  vancomycin  IVPB        MEDICATIONS  (PRN):    PRESENT SYMPTOMS: [ ]Unable to obtain due to poor mentation   Source if other than patient:  [ ]Family   [ ]Team     Pain: [ ]yes [ ]no  QOL impact -   Location -                    Aggravating factors -  Quality -  Radiation -  Timing-  Severity (0-10 scale):  Minimal acceptable level (0-10 scale):     CPOT:    https://www.[facility].org/getattachment/[email or web address]/Critical-Care-Pain-Observation-Tool-(CPOT)      PAIN AD Score:     http://geriatrictoolkit.[place].[email or web address]/cog/painad.pdf (press ctrl +  left click to view)    Dyspnea:                           [ ]Mild [ ]Moderate [ ]Severe  Anxiety:                             [ ]Mild [ ]Moderate [ ]Severe  Fatigue:                             [ ]Mild [ ]Moderate [ ]Severe  Nausea:                             [ ]Mild [ ]Moderate [ ]Severe  Loss of appetite:              [ ]Mild [ ]Moderate [ ]Severe  Constipation:                    [ ]Mild [ ]Moderate [ ]Severe    Other Symptoms:  [ ]All other review of systems negative     Palliative Performance Status Version 2:         %    http://npcrc.org/files/news/palliative_performance_scale_ppsv2.pdf  PHYSICAL EXAM:  Vital Signs Last 24 Hrs  T(C): 36.2 (13 Dec 2021 08:00), Max: 36.2 (13 Dec 2021 08:00)  T(F): 97.2 (13 Dec 2021 08:00), Max: 97.2 (13 Dec 2021 08:00)  HR: 81 (13 Dec 2021 11:15) (79 - 112)  BP: 80/48 (13 Dec 2021 01:00) (78/60 - 80/48)  BP(mean): 55 (13 Dec 2021 01:00) (55 - 65)  RR: 29 (13 Dec 2021 11:15) (17 - 42)  SpO2: 100% (13 Dec 2021 11:15) (86% - 100%) I&O's Summary    12 Dec 2021 07:01  -  13 Dec 2021 07:00  --------------------------------------------------------  IN: 1330.9 mL / OUT: 500 mL / NET: 830.9 mL    13 Dec 2021 07:01  -  13 Dec 2021 11:21  --------------------------------------------------------  IN: 341.8 mL / OUT: 200 mL / NET: 141.8 mL      GENERAL:  [ ]Alert  [ ]Oriented x   [ ]Lethargic  [ ]Cachexia  [ ]Unarousable  [ ]Verbal  [ ]Non-Verbal  Behavioral:   [ ] Anxiety  [ ] Delirium [ ] Agitation [ ] Other  HEENT:  [ ]Normal   [ ]Dry mouth   [ ]ET Tube/Trach  [ ]Oral lesions  PULMONARY:   [ ]Clear [ ]Tachypnea  [ ]Audible excessive secretions   [ ]Rhonchi        [ ]Right [ ]Left [ ]Bilateral  [ ]Crackles        [ ]Right [ ]Left [ ]Bilateral  [ ]Wheezing     [ ]Right [ ]Left [ ]Bilateral  [ ]Diminished breath sounds [ ]right [ ]left [ ]bilateral  CARDIOVASCULAR:    [ ]Regular [ ]Irregular [ ]Tachy  [ ]Jose [ ]Murmur [ ]Other  GASTROINTESTINAL:  [ ]Soft  [ ]Distended   [ ]+BS  [ ]Non tender [ ]Tender  [ ]PEG [ ]OGT/ NGT  Last BM:   GENITOURINARY:  [ ]Normal [ ] Incontinent   [ ]Oliguria/Anuria   [ ]Landrum  MUSCULOSKELETAL:   [ ]Normal   [ ]Weakness  [ ]Bed/Wheelchair bound [ ]Edema  NEUROLOGIC:   [ ]No focal deficits  [ ]Cognitive impairment  [ ]Dysphagia [ ]Dysarthria [ ]Paresis [ ]Other   SKIN:   [ ]Normal    [ ]Rash  [ ]Pressure ulcer(s)       Present on admission [ ]y [ ]n    CRITICAL CARE:  [ ] Shock Present  [ ]Septic [ ]Cardiogenic [ ]Neurologic [ ]Hypovolemic  [ ]  Vasopressors [ ]  Inotropes   [ ]Respiratory failure present [ ]Mechanical ventilation [ ]Non-invasive ventilatory support [ ]High flow    [ ]Acute  [ ]Chronic [ ]Hypoxic  [ ]Hypercarbic [ ]Other  [ ]Other organ failure     LABS:                        10.7   16.40 )-----------( 222      ( 13 Dec 2021 01:42 )             30.9   12-13    127<L>  |  91<L>  |  14  ----------------------------<  129<H>  5.5<H>   |  21<L>  |  0.64    Ca    9.1      13 Dec 2021 01:42  Phos  3.2     12-13  Mg     2.0     12-13    TPro  8.1  /  Alb  3.0<L>  /  TBili  0.6  /  DBili  x   /  AST  86<H>  /  ALT  24  /  AlkPhos  123<H>  12-13  PT/INR - ( 13 Dec 2021 01:44 )   PT: 14.9 sec;   INR: 1.26 ratio         PTT - ( 13 Dec 2021 01:44 )  PTT:31.0 sec    Urinalysis Basic - ( 13 Dec 2021 02:27 )    Color: Yellow / Appearance: Clear / S.027 / pH: x  Gluc: x / Ketone: Small  / Bili: Negative / Urobili: Negative   Blood: x / Protein: 100 / Nitrite: Negative   Leuk Esterase: Negative / RBC: 1 /hpf / WBC 2 /HPF   Sq Epi: x / Non Sq Epi: 0 /hpf / Bacteria: Negative      RADIOLOGY & ADDITIONAL STUDIES:    PROTEIN CALORIE MALNUTRITION PRESENT: [ ]mild [ ]moderate [ ]severe [ ]underweight [ ]morbid obesity  https://www.andeal.org/vault/2440/web/files/ONC/Table_Clinical%20Characteristics%20to%20Document%20Malnutrition-White%20JV%20et%20al%926004.pdf    Height (cm): 182.9 (21 @ 04:30), 165.1 (21 @ 13:55), 177.8 (10-18-21 @ 15:55)  Weight (kg): 55.7 (21 @ 02:12), 53.6 (21 @ :55), 61.6 (10-18-21 @ 15:55)  BMI (kg/m2): 16.7 (21 @ 04:30), 20.4 (21 @ 02:12), 19.7 (21 @ 13:55)    [ ]PPSV2 < or = to 30% [ ]significant weight loss  [ ]poor nutritional intake  [ ]anasarca      [ ]Artificial Nutrition      REFERRALS:   [ ]Chaplaincy  [ ]Hospice  [ ]Child Life  [ ]Social Work  [ ]Case management [ ]Holistic Therapy     Goals of Care Document:   ID # [id number]  HPI:  65M PMH ACC/AHA stage D HF due to NICM HM2 LVAD , TV annuloplasty ring 17 as destination therapy due to severe peripheral artery disease with significant stenosis  SIADH, Depression, CKD-3 with hyperkalemia, past E. coli UTIs, driveline drainage (21) and COVID-19 (back in 2020). Recurrent syncope post LVAD s/p ILR, and chronic abdominal pain and was noted to have a prolong hospital course (-). During that time he has multiple infections and now remains on suppressive antibiotics. Underwent SMA stent with Vascular in 10/2021, now tolerating PO diet, perc dawn drain placement and mutiple GI bleeds. Ultimately was trach and discharged to rehab to get stronger. Patient returned from rehab for trach decannulation, which was removed on .    Now presented from [facility] on  with AMS, hypotensive, tachycardic found to be COVID (+). Pt is lethargic and mumbling words.     (13 Dec 2021 01:06)    Off note, the patient was admitted between  and  then between  and  and note is readmitted for the reasons above. As per HF, the patient rescinded his code status and he is now full code. Palliative care was called for GOC.     PERTINENT PM/SXH:   No pertinent past medical history    CHF (congestive heart failure)    CAD (coronary artery disease)    Depression    Pleural effusion    History of  novel coronavirus disease (COVID-19)    Hemorrhoids    Bleeding hemorrhoids    Peripheral arterial disease    Claudication    BPH with urinary obstruction    ACC/AHA stage D systolic heart failure    Anticoagulation goal of INR 2.0 to 2.5    Falls    Clavicle fracture    CKD (chronic kidney disease), stage III    Iron deficiency anemia    H/O epistaxis    Vertigo    GI bleed      No significant past surgical history    S/P TVR (tricuspid valve repair)    S/P ventricular assist device    S/P endoscopy    H/O tracheostomy      FAMILY HISTORY:    ITEMS NOT CHECKED ARE NOT PRESENT    SOCIAL HISTORY:   Significant other/partner[ ]  Children[x ]  Roman Catholic/Spirituality:  Substance hx:  [ ]   Tobacco hx:  [ ]   Alcohol hx: [ ]   Home Opioid hx:  [ ] I-Stop Reference No:  Living Situation: [ ]Home  [ ]Long term care  [ ]Rehab [ ]Other    ADVANCE DIRECTIVES:    DNR  MOLST  [ ]  Living Will  [ ]   DECISION MAKER(s):  [x ] Health Care Proxy(s)  [ ] Surrogate(s)  [ ] Guardian           Name(s): Phone Number(s): Alyssa Ogden [phone]    BASELINE (I)ADL(s) (prior to admission):  [place]: [ ]Total  [ ] Moderate [x ]Dependent    Allergies    Amiodarone Hydrochloride (Other (Severe))    Intolerances    MEDICATIONS  (STANDING):  cefepime   IVPB      cefepime   IVPB 1000 milliGRAM(s) IV Intermittent every 8 hours  enoxaparin Injectable 40 milliGRAM(s) SubCutaneous daily  multiple electrolytes Injection Type 1 1000 milliLiter(s) (75 mL/Hr) IV Continuous <Continuous>  pantoprazole  Injectable 40 milliGRAM(s) IV Push every 12 hours  phenylephrine    Infusion 0.3 MICROgram(s)/kG/Min (6.27 mL/Hr) IV Continuous <Continuous>  sodium chloride 0.9% lock flush 3 milliLiter(s) IV Push every 8 hours  vancomycin  IVPB 1000 milliGRAM(s) IV Intermittent every 12 hours  vancomycin  IVPB        MEDICATIONS  (PRN):    PRESENT SYMPTOMS: [ ]Unable to obtain due to poor mentation   Source if other than patient:  [ ]Family   [ ]Team     Pain: [ ]yes [x ]no  QOL impact -   Location -                    Aggravating factors -  Quality -  Radiation -  Timing-  Severity (0-10 scale):  Minimal acceptable level (0-10 scale):     CPOT:    https://www.[facility].org/getattachment/[email or web address]-[email or web address]/Critical-Care-Pain-Observation-Tool-(CPOT)      PAIN AD Score:     http://geriatrictoolkit.[place].[facility]/cog/painad.pdf (press ctrl +  left click to view)    Dyspnea:                           [ ]Mild [ ]Moderate [ ]Severe  Anxiety:                             [ ]Mild [ ]Moderate [ ]Severe  Fatigue:                             [ ]Mild [ ]Moderate [ ]Severe  Nausea:                             [ ]Mild [ ]Moderate [ ]Severe  Loss of appetite:              [ ]Mild [ ]Moderate [ ]Severe  Constipation:                    [ ]Mild [ ]Moderate [ ]Severe    Other Symptoms:  [x ]All other review of systems negative but hiccups     Palliative Performance Status Version 2:     30    %    http://[facility].org/files/news/palliative_performance_scale_ppsv2.pdf  PHYSICAL EXAM:  Vital Signs Last 24 Hrs  T(C): 36.2 (13 Dec 2021 08:00), Max: 36.2 (13 Dec 2021 08:00)  T(F): 97.2 (13 Dec 2021 08:00), Max: 97.2 (13 Dec 2021 08:00)  HR: 81 (13 Dec 2021 11:15) (79 - 112)  BP: 80/48 (13 Dec 2021 01:00) (78/60 - 80/48)  BP(mean): 55 (13 Dec 2021 01:00) (55 - 65)  RR: 29 (13 Dec 2021 11:15) (17 - 42)  SpO2: 100% (13 Dec 2021 11:15) (86% - 100%) I&O's Summary    12 Dec 2021 07:01  -  13 Dec 2021 07:00  --------------------------------------------------------  IN: 1330.9 mL / OUT: 500 mL / NET: 830.9 mL    13 Dec 2021 07:01  -  13 Dec 2021 11:21  --------------------------------------------------------  IN: 341.8 mL / OUT: 200 mL / NET: 141.8 mL      GENERAL:  [ x]Alert  [x ]Oriented x 2  [ ]Lethargic  [ ]Cachexia  [ ]Unarousable  [x ]Verbal  [ ]Non-Verbal  Behavioral:   [ ] Anxiety  [ ] Delirium [ ] Agitation [ ] Other  HEENT:  [ ]Normal   [x ]Dry mouth   [ ]ET Tube/Trach  [ ]Oral lesions  PULMONARY:   [ ]Clear [ ]Tachypnea  [ ]Audible excessive secretions   [ ]Rhonchi        [ ]Right [ ]Left [ ]Bilateral  [ ]Crackles        [ ]Right [ ]Left [ ]Bilateral  [ ]Wheezing     [ ]Right [ ]Left [ ]Bilateral  [x ]Diminished breath sounds [ ]right [ ]left [ x]bilateral  CARDIOVASCULAR:    [x ]Regular [ ]Irregular [ ]Tachy  [ ]Jose [ ]Murmur [x ]Other: LVAD   GASTROINTESTINAL:  [x ]Soft  [ ]Distended   [x ]+BS  [x ]Non tender [ ]Tender  [ ]PEG [ ]OGT/ NGT  Last BM:   GENITOURINARY:  [ ]Normal [x ] Incontinent   [ ]Oliguria/Anuria   [ ]Landrum  MUSCULOSKELETAL:   [ ]Normal   [x ]Weakness  [ ]Bed/Wheelchair bound [ ]Edema  NEUROLOGIC:   [x ]No focal deficits  [ ]Cognitive impairment  [ ]Dysphagia [ ]Dysarthria [ ]Paresis [ ]Other   SKIN:   [ ]Normal    [ ]Rash  [ ]Pressure ulcer(s)       Present on admission [ ]y [ ]n    CRITICAL CARE:  [ ] Shock Present  [ ]Septic [ ]Cardiogenic [ ]Neurologic [ ]Hypovolemic  [ ]  Vasopressors [ ]  Inotropes   [ ]Respiratory failure present [ ]Mechanical ventilation [ ]Non-invasive ventilatory support [ ]High flow    [ ]Acute  [ ]Chronic [ ]Hypoxic  [ ]Hypercarbic [ ]Other  [ ]Other organ failure     LABS:                        10.7   16.40 )-----------( 222      ( 13 Dec 2021 01:42 )             30.9   12-13    127<L>  |  91<L>  |  14  ----------------------------<  129<H>  5.5<H>   |  21<L>  |  0.64    Ca    9.1      13 Dec 2021 01:42  Phos  3.2     12-13  Mg     2.0     12-    TPro  8.1  /  Alb  3.0<L>  /  TBili  0.6  /  DBili  x   /  AST  86<H>  /  ALT  24  /  AlkPhos  123<H>  12-13  PT/INR - ( 13 Dec 2021 01:44 )   PT: 14.9 sec;   INR: 1.26 ratio         PTT - ( 13 Dec 2021 01:44 )  PTT:31.0 sec    Urinalysis Basic - ( 13 Dec 2021 02:27 )    Color: Yellow / Appearance: Clear / S.027 / pH: x  Gluc: x / Ketone: Small  / Bili: Negative / Urobili: Negative   Blood: x / Protein: 100 / Nitrite: Negative   Leuk Esterase: Negative / RBC: 1 /hpf / WBC 2 /HPF   Sq Epi: x / Non Sq Epi: 0 /hpf / Bacteria: Negative      RADIOLOGY & ADDITIONAL STUDIES: Reviewed.     PROTEIN CALORIE MALNUTRITION PRESENT: [ ]mild [ ]moderate [ ]severe [ ]underweight [ ]morbid obesity  https://www.andeal.org/vault/2440/web/files/ONC/Table_Clinical%20Characteristics%20to%20Document%20Malnutrition-White%20JV%20et%20al%907178.pdf    Height (cm): 182.9 (21 @ 04:30), 165.1 (21 @ 13:55), 177.8 (10-18-21 @ 15:55)  Weight (kg): 55.7 (21 @ 02:12), 53.6 (21 @ :55), 61.6 (10-18-21 @ 15:55)  BMI (kg/m2): 16.7 (21 @ 04:30), 20.4 (21 @ 02:12), 19.7 (21 @ :55)    [ ]PPSV2 < or = to 30% [ ]significant weight loss  [ ]poor nutritional intake  [ ]anasarca      [ ]Artificial Nutrition      REFERRALS:   [ ]Chaplaincy  [ ]Hospice  [ ]Child Life  [ ]Social Work  [ ]Case management [ ]Holistic Therapy     Goals of Care Document:

## 2021-12-13 NOTE — PROCEDURE NOTE - NSINDICATIONS_GEN_A_CORE
arterial puncture to obtain ABG's/critical patient/monitoring purposes
critical illness/hemodynamic monitoring/hypertonic/irritant infusion/venous access

## 2021-12-13 NOTE — CONSULT NOTE ADULT - SUBJECTIVE AND OBJECTIVE BOX
HPI:     Home Medications:  chlorhexidine 2% topical pad: 1 application topically  (17 Nov 2021 13:43)      Medications:  cefepime   IVPB      cefepime   IVPB 1000 milliGRAM(s) IV Intermittent every 8 hours  enoxaparin Injectable 40 milliGRAM(s) SubCutaneous daily  multiple electrolytes Injection Type 1 1000 milliLiter(s) IV Continuous <Continuous>  pantoprazole  Injectable 40 milliGRAM(s) IV Push every 12 hours  phenylephrine    Infusion 0.3 MICROgram(s)/kG/Min IV Continuous <Continuous>  sodium chloride 0.9% lock flush 3 milliLiter(s) IV Push every 8 hours  vancomycin  IVPB 1000 milliGRAM(s) IV Intermittent every 12 hours  vancomycin  IVPB          ICU Vital Signs Last 24 Hrs  T(C): 36.2 (13 Dec 2021 08:00), Max: 36.2 (13 Dec 2021 08:00)  T(F): 97.2 (13 Dec 2021 08:00), Max: 97.2 (13 Dec 2021 08:00)  HR: 79 (13 Dec 2021 08:30) (79 - 112)  BP: 80/48 (13 Dec 2021 01:00) (78/60 - 80/48)  BP(mean): 55 (13 Dec 2021 01:00) (55 - 65)  ABP: 96/71 (13 Dec 2021 08:30) (61/53 - 96/71)  ABP(mean): 81 (13 Dec 2021 08:30) (57 - 81)  RR: 27 (13 Dec 2021 08:30) (20 - 42)  SpO2: 100% (13 Dec 2021 08:30) (86% - 100%)        I&O's Summary    12 Dec 2021 07:01  -  13 Dec 2021 07:00  --------------------------------------------------------  IN: 1330.9 mL / OUT: 500 mL / NET: 830.9 mL    13 Dec 2021 07:01  -  13 Dec 2021 08:56  --------------------------------------------------------  IN: 166.6 mL / OUT: 0 mL / NET: 166.6 mL        Physical Exam  General: lethargic   HEENT: EOM intact.  Neck: JVP not elevated   Chest: rochni all the way up b/l  CV: +VAD hum  Abdomen: Soft, non-distended, non-tender, +biliary drain   Skin: No rashes or skin breakdown  Neurology: Alert and oriented     LVAD Interrogation  VAD TYPE HM 2  Speed  9200  Flow 5.5  Power 5.4  PI  5.3  Assessment of driveline exit site: driveline dressing c/d/i  Programming changes: no changes made    Labs:                        10.7   16.40 )-----------( 222      ( 13 Dec 2021 01:42 )             30.9     12-13    127<L>  |  91<L>  |  14  ----------------------------<  129<H>  5.5<H>   |  21<L>  |  0.64    Ca    9.1      13 Dec 2021 01:42  Phos  3.2     12-13  Mg     2.0     12-13    TPro  8.1  /  Alb  3.0<L>  /  TBili  0.6  /  DBili  x   /  AST  86<H>  /  ALT  24  /  AlkPhos  123<H>  12-13    PT/INR - ( 13 Dec 2021 01:44 )   PT: 14.9 sec;   INR: 1.26 ratio         PTT - ( 13 Dec 2021 01:44 )  PTT:31.0 sec          Lactate Dehydrogenase, Serum: 835 U/L (12-13 @ 01:42)         HPI:     Mr. Quispe is a 67 y/o M with a history of Stage D NICM s/p HM2 LVAD in 9/2017 at  (due to severe PAD) with TV ring, multiple GI bleeds, thus maintained off AC, CKD 3prior COVID-19 infection in 4/2020, recurrent syncope post LVAD s/p ILR, s/p prolonged complex hospitalization from 6/14-11/16/2021 initially admitted with abdominal pain complicated by GIB, respiratory failure s/p trach, multiple infections, s/p cholecystotomy tube and SMA stent 10/2021, ultimately discharged to [facility] rehab and then returned to [facility] for trach decannulation 12/2. He was discharged home on 12/6. He now presents from [facility] on 12/13 with AMS, hypotension and tachycardia found to be COVID +. He was not vaccinated. Recently, prior to presentation he was noted to be doing well overall with some decompensation over the weekend.     He was found to be hypotensive and was started on av. He has now received monoclonal antibodies. No events noted on LVAD interrogation. Mental status improved this afternoon. He denies SOB at rest, orthopnea, lightheadedness, abdominal discomfort.     Regarding his recent hospitalization, he was admitted 6/14/21 with symptomatic anemia with Hgb 4.5 in setting of INR 8.8 without hemodynamic instability and has since had a protracted hospitalization. He was transfused and underwent VCE which showed active bleeding in the mid small bowel but subsequent enteroscopy 6/15 did not reveal any active bleeding. He acutely decompensated after procedure with fever/hypertension, low flow alarms, and pulmonary infiltrate with hypoxia requiring intubation from probable aspiration PNA. He was unable to extubated and has since undergone tracheostomy but tolerating persistent trach collar and nearing ability for decannulation. 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 He underwent angiogram on 9/10, stent was unable to be placed and course was then complicated by RP bleed. He continued to have persistent leukocytosis and febrile episodes and was noted to have positive sputum culture for serratia marcescens and completed his course of abx. He was initially decannulated on 9/23. Recently he started having multiples of melena, emesis and went into acte respiratory distress and was ultimately re-intubated on 9/26.     He had blood cultures positive for enterobacter cloacae/serratia and sputum positive for stenotrophomonas remains on IV antibiotics. He is s/p trach with ENT on 10/4. He underwent SMA stent x 2 on 10/18. His nausea and vomiting has improved, and he is now tolerating PO diet along with tube feeds. His DNR/DNI has been rescinded. Diet has been advanced and keotube has been removed. He returned for trach decannulation on 12/2.    Home Medications:  asa 81, HDZN 25 tid, toprol 50, ald 25 po Vanco, cipro 500 Q12. PPI, reglan. methimazole. mirtazapine, sertraline.       Medications:  cefepime   IVPB      cefepime   IVPB 1000 milliGRAM(s) IV Intermittent every 8 hours  enoxaparin Injectable 40 milliGRAM(s) SubCutaneous nayla  multiple electrolytes Injection Type 1 1000 milliLiter(s) IV Continuous <Continuous>  pantoprazole  Injectable 40 milliGRAM(s) IV Push every 12 hours  phenylephrine    Infusion 0.3 MICROgram(s)/kG/Min IV Continuous <Continuous>  sodium chloride 0.9% lock flush 3 milliLiter(s) IV Push every 8 hours  vancomycin  IVPB 1000 milliGRAM(s) IV Intermittent every 12 hours  vancomycin  IVPB          ICU Vital Signs Last 24 Hrs  T(C): 36.2 (13 Dec 2021 08:00), Max: 36.2 (13 Dec 2021 08:00)  T(F): 97.2 (13 Dec 2021 08:00), Max: 97.2 (13 Dec 2021 08:00)  HR: 79 (13 Dec 2021 08:30) (79 - 112)  BP: 80/48 (13 Dec 2021 01:00) (78/60 - 80/48)  BP(mean): 55 (13 Dec 2021 01:00) (55 - 65)  ABP: 96/71 (13 Dec 2021 08:30) (61/53 - 96/71)  ABP(mean): 81 (13 Dec 2021 08:30) (57 - 81)  RR: 27 (13 Dec 2021 08:30) (20 - 42)  SpO2: 100% (13 Dec 2021 08:30) (86% - 100%)    I&O's Summary    12 Dec 2021 07:01  -  13 Dec 2021 07:00  --------------------------------------------------------  IN: 1330.9 mL / OUT: 500 mL / NET: 830.9 mL    13 Dec 2021 07:01  -  13 Dec 2021 08:56  --------------------------------------------------------  IN: 166.6 mL / OUT: 0 mL / NET: 166.6 mL    Physical Exam  General: lethargic   HEENT: EOM intact.  Neck: JVP not elevated   Chest: rochni all the way up b/l  CV: +VAD hum  Abdomen: Soft, non-distended, non-tender, +biliary drain   Skin: No rashes or skin breakdown  Neurology: Alert and oriented     LVAD Interrogation  VAD TYPE HM 2  Speed  9200  Flow 5.5  Power 5.4  PI  5.3  Assessment of driveline exit site: driveline dressing c/d/i  Programming changes: no changes made    Labs:                        10.7   16.40 )-----------( 222      ( 13 Dec 2021 01:42 )             30.9     12-13    127<L>  |  91<L>  |  14  ----------------------------<  129<H>  5.5<H>   |  21<L>  |  0.64    Ca    9.1      13 Dec 2021 01:42  Phos  3.2     12-13  Mg     2.0     12-13    TPro  8.1  /  Alb  3.0<L>  /  TBili  0.6  /  DBili  x   /  AST  86<H>  /  ALT  24  /  AlkPhos  123<H>  12-13    PT/INR - ( 13 Dec 2021 01:44 )   PT: 14.9 sec;   INR: 1.26 ratio         PTT - ( 13 Dec 2021 01:44 )  PTT:31.0 sec    Lactate Dehydrogenase, Serum: 835 U/L (12-13 @ 01:42)

## 2021-12-13 NOTE — CONSULT NOTE ADULT - ASSESSMENT
Mr. Quispe is a 67 y/o M with a history of Stage D NICM s/p HM2 LVAD in 9/2017 at  (due to severe PAD) with TV ring, multiple GI bleeds, thus maintained off AC, CKD 3prior COVID-19 infection in 4/2020, recurrent syncope post LVAD s/p ILR, s/p prolonged complex hospitalization from 6/14-11/16/2021 initially admitted with abdominal pain complicated by GIB, respiratory failure s/p trach, multiple infections, s/p cholecystotomy tube and SMA stent 10/2021, ultimately discharged to [facility] rehab and then returned to [facility] for trach decannulation 12/2 who now presents with recurrent COVID-19 infection in shock. He has elevated inflammatory markers with , fibrinogen 668, rising  from 251 in 12/6. Additionally of note his ddimer is elevated at 3600 as is his procalcitonin at 4.48. Blood cultures sent with results pending. He has now received monoclonal antibodies. He is requiring 3L NC. He is having increasing vasopressor requirements to maintain MAP 65. His mental status is improving and he reports feeling well.

## 2021-12-13 NOTE — CONSULT NOTE ADULT - PROBLEM SELECTOR RECOMMENDATION 2
Unclear etiology.   Work up and Rx as per the primary team.   For symptoms Rx, may consider Baclofen 5mg PO tid.
- Dr. Prater, ID following  - s/p monoclonal antibodies  - Plan for remdesivir and dexamethasone per critical care team

## 2021-12-13 NOTE — H&P ADULT - NSICDXPASTSURGICALHX_GEN_ALL_CORE_FT
PAST SURGICAL HISTORY:  H/O tracheostomy     S/P endoscopy     S/P TVR (tricuspid valve repair)     S/P ventricular assist device

## 2021-12-13 NOTE — CONSULT NOTE ADULT - ASSESSMENT
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 coli UTIs, driveline drainage (1/7/21) and COVID-19 (back in April 2020). Recurrent syncope post LVAD s/p ILR, and chronic abdominal pain and was noted to have a prolong hospital course (6/14-11/16). During that time he has multiple infections and now remains on suppressive antibiotics. Underwent SMA stent with Vascular in 10/2021, now tolerating PO diet, perc dawn drain placement and mutiple GI bleeds. Ultimately was trach and discharged to rehab to get stronger. Patient returned from rehab for trach decannulation, which was removed on 12/2. Now presented from [facility] on 12/13 with AMS, hypotensive, tachycardic found to be COVID (+). Pt was lethargic and mumbling words. However, his mental status improved. Palliative care was called for ACP.     Off note, the patient was admitted between 6/14 and 11/17 then between 12/2 and 12/6 and note is readmitted for the reasons above. As per HF, the patient rescinded his code status and he is now full code. Palliative care was called for GOC.

## 2021-12-13 NOTE — H&P ADULT - HISTORY OF PRESENT ILLNESS
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 coli UTIs, driveline drainage (1/7/21) and COVID-19 (back in April 2020). Recurrent syncope post LVAD s/p ILR, and chronic abdominal pain and was noted to have a prolong hospital course (6/14-11/16). During that time he has multiple infections and now remains on suppressive antibiotics. Underwent SMA stent with Vascular in 10/2021, now tolerating PO diet, perc dawn drain placement and mutiple GI bleeds. Ultimately was trach and discharged to rehab to get stronger. Patient returned from rehab for trach decannulation, which was removed on 12/2.    Now presented from [facility] on 12/13 with AMS, hypotensive, tachycardic found to be COVID (+). Pt is lethargic and mumbling words.

## 2021-12-13 NOTE — PROCEDURE NOTE - ESTIMATED BLOOD LOSS
"Orthopedic Surgery Progress Note    Subjective / Interval Events:   Patient resting in bed; no acute distress; pain controlled.  Denies chest pain.  Pain well controlled on current regimen.      Had multiple questions regarding her treatment plan which where answered. Mostly to do with her post op course and WBS.     Objective:   Vital Signs Temp (24hrs), Av.7  F (35.9  C), Min:95.7  F (35.4  C), Max:98.2  F (36.8  C)    /70   Pulse 81   Temp 95.7  F (35.4  C) (Axillary)   Resp 16   Ht 1.651 m (5' 5\")   Wt 86.3 kg (190 lb 4.1 oz)   LMP 2010   SpO2 95%   BMI 31.66 kg/m      Date 20 0700 - 20 0659   Shift 6942-9133 5354-3798 0414-9223 24 Hour Total   INTAKE   P.O.  200  200   I.V. 700 950  1650   Shift Total(mL/kg) 700(8.11) 1150(13.33)  1850(21.44)   OUTPUT   Urine 100 155  255   Blood  400  400   Shift Total(mL/kg) 100(1.16) 555(6.43)  655(7.59)   Weight (kg) 86.3 86.3 86.3 86.3       Physical Examination   General: no acute distress  CV: palpable pulses  Resp: Nonlabored breathing    Right Lower Extremity Demonstrates:       5/5 Tibialis anterior, extensor houses longus, gastrocsoleus.     Sensation to light touch intact in the L2-S1 dermatome distribution.    Left dorsalis pedis pulse palpated to be 2/4.    Non-Tender Calf      Labs (Last 24 hour):   Lab Results   Component Value Date    WBC 6.2 2020    HGB 13.3 2020    HCT 42.1 2020    MCV 89 2020     2020    RDW 14.7 2020     Lab Results   Component Value Date    BUN 14 2020     2020    CO2 27 2020    ANIONGAP 5 2020       All cultures:  Recent Labs   Lab 20  1444   CULT PENDING     Assessment / Plan:   This is a 67 year old female s/p RTHA Revision on 20 by Dr. Grey.    Pain control; continue to wean medications as tolerated  CV: Monitor HR/BP  Resp: Encourage IS  Heme/ID: Monitor Hgb  GI/: diet as tolerated  PT/OT: TTWB RLE  Post-op "
antibiotics Vancomycin  DVT ppx: ASA    Plan for discharge Likely rehab.   Will be TTWB X12 Weeks      Isaias Castaneda DO  Adult Joint Reconstruction Fellow  Dept Orthopaedic Surgery, [facility]  [phone] Pager [phone] Office    
None
None

## 2021-12-13 NOTE — CONSULT NOTE ADULT - PROBLEM SELECTOR RECOMMENDATION 9
Though in a simple way, the patient was able to give verbalized understanding about his conditions (HF s/p LVAD, prior prolonged hospitalization due to issues with feeding and s/p a surgical procedure, and now with COVID-19 and hypotension). We then discussed about code status and he indicated he wanted to be full code. When it comes to intubation, he recognized that in the setting of respiratory failure, if he were to opt for not being intubated, that he will likely die. However, his desire was to prolong his life. Hence, intubation, was aligned with his goals. I tried to d/w him about what to do if he were to become dependent on a ventilator, however, he was not able to give an answer. When it comes to a cardiac arrest, we discussed about the lack of chances for him to be able to survive a scenario were his heart, due to pump failure or lack of blood flow, were to stop. I also indicated that, in such scenario, CPR was unlikely to be helpful. The patient indicated he wanted to be full code, even if he were not to be able to survive CPR, since, surviving, even in a compromised state, was more acceptable to him than dying.     We mere discussed about his sister being his HCP and if he were to have any intention to change her as a HCP. However, he indicated that he was ok with his sister continuing to be his HCP.    d/w the LVAD coordinators.
- Continue with speed of 9200  - Daily LDH  - Recommend switching av to vaso. Wean vasopressors as above to maintain MAP 70-80  - Resume ASA 81mg daily

## 2021-12-13 NOTE — CHART NOTE - NSCHARTNOTEFT_GEN_A_CORE
Went to see this patient in 5 ICU to discuss his goals of care and discuss the timeline of this illness.  I spoke with the patient and ascertained he was A&O X 3. I then called the [facility]  Trini ID# [id number], I discussed with the patient if he wants to be resuscitated if he has difficulty breathing or if his heart stops. He stated he wants to be a full code and that he wants his sister to be his health care proxy.   I reviewed his history on his LVAD and he only had some low voltages last pm otherwise no issues. He does look like he may have some wire exposed on his white bend relief of his power cable but not causing any issues at this time.  He is COVID positive and has an elevated LDH but he also has other inflammatory markers which are elevated and does not seem to have any issues related to his pump. He is only on ASA for his stent as he had GI bleeding on anticoagulation.

## 2021-12-14 NOTE — PROGRESS NOTE ADULT - SUBJECTIVE AND OBJECTIVE BOX
RICKY HAMPTON                     MRN-[id number]    HPI:  65M PMH ACC/AHA stage D HF due to NICM HM2 LVAD , TV annuloplasty ring 17 as destination therapy due to severe peripheral artery disease with significant stenosis  SIADH, Depression, CKD-3 with hyperkalemia, past E. coli UTIs, driveline drainage (21) and COVID-19 (back in 2020). Recurrent syncope post LVAD s/p ILR, and chronic abdominal pain and was noted to have a prolong hospital course (-). During that time he has multiple infections and now remains on suppressive antibiotics. Underwent SMA stent with Vascular in 10/2021, now tolerating PO diet, perc sybil drain placement and mutiple GI bleeds. Ultimately was trach and discharged to rehab to get stronger. Patient returned from rehab for trach decannulation, which was removed on .    Now presented from [facility] on  with AMS, hypotensive, tachycardic found to be COVID (+). Pt is lethargic and mumbling words.      (13 Dec 2021 01:06)      Hospital Course:    rehab   admit to stepdown for decannulation    discharge home    syncope, VT at home, started on amio    COVID +, AMS, hypotensive, tx from [facility]     VITAL SIGNS:  Vital Signs Last 24 Hrs  T(C): 36.4 (14 Dec 2021 13:00), Max: 36.8 (14 Dec 2021 04:00)  T(F): 97.5 (14 Dec 2021 13:00), Max: 98.2 (14 Dec 2021 04:00)  HR: 64 (14 Dec 2021 13:30) (48 - 87)  BP: --  BP(mean): --  RR: 21 (14 Dec 2021 13:30) (17 - 34)  SpO2: 100% (14 Dec 2021 13:30) (84% - 100%)    =========================I&Os=========================  I/Os:  I&O's Detail    13 Dec 2021 07:01  -  14 Dec 2021 07:00  --------------------------------------------------------  IN:    Albumin 5%  - 250 mL: 250 mL    IV PiggyBack: 950 mL    multiple electrolytes Injection Type 1.: 1530 mL    Norepinephrine: 10.4 mL    Phenylephrine: 104.4 mL    Vasopressin: 37.8 mL  Total IN: 2882.6 mL    OUT:    Drain (mL): 125 mL    Voided (mL): 700 mL  Total OUT: 825 mL    Total NET: 2057.6 mL      14 Dec 2021 07:01  -  14 Dec 2021 13:54  --------------------------------------------------------  IN:    IV PiggyBack: 250 mL    multiple electrolytes Injection Type 1.: 315 mL    Oral Fluid: 180 mL    Vasopressin: 16.8 mL  Total IN: 761.8 mL    OUT:    Drain (mL): 0 mL    Voided (mL): 500 mL  Total OUT: 500 mL    Total NET: 261.8 mL          CAPILLARY BLOOD GLUCOSE          ============================LABS=========================                        8.7    11.74 )-----------( 128      ( 14 Dec 2021 01:04 )             27.2     12-14    131<L>  |  97  |  11  ----------------------------<  209<H>  4.2   |  24  |  0.53    Ca    8.9      14 Dec 2021 01:04  Phos  3.7     12-14  Mg     2.0     12-14    TPro  6.9  /  Alb  3.0<L>  /  TBili  0.4  /  DBili  x   /  AST  28  /  ALT  18  /  AlkPhos  109  12-14    LIVER FUNCTIONS - ( 14 Dec 2021 01:04 )  Alb: 3.0 g/dL / Pro: 6.9 g/dL / ALK PHOS: 109 U/L / ALT: 18 U/L / AST: 28 U/L / GGT: x           PT/INR - ( 14 Dec 2021 01:04 )   PT: 15.3 sec;   INR: 1.29 ratio         PTT - ( 14 Dec 2021 01:04 )  PTT:32.4 sec  ABG - ( 14 Dec 2021 09:56 )  pH, Arterial: 7.34  pH, Blood: x     /  pCO2: 47    /  pO2: 160   / HCO3: 25    / Base Excess: -0.5  /  SaO2: 99.9              Urinalysis Basic - ( 13 Dec 2021 02:27 )    Color: Yellow / Appearance: Clear / S.027 / pH: x  Gluc: x / Ketone: Small  / Bili: Negative / Urobili: Negative   Blood: x / Protein: 100 / Nitrite: Negative   Leuk Esterase: Negative / RBC: 1 /hpf / WBC 2 /HPF   Sq Epi: x / Non Sq Epi: 0 /hpf / Bacteria: Negative        ===========================PMHX&PSHx==========================  PAST MEDICAL & SURGICAL HISTORY:  CHF (congestive heart failure)    CAD (coronary artery disease)    Depression    Pleural effusion    History of 2019 novel coronavirus disease (COVID-19)  2020    Hemorrhoids    Bleeding hemorrhoids    Peripheral arterial disease    Claudication    BPH with urinary obstruction    ACC/AHA stage D systolic heart failure    Anticoagulation goal of INR 2.0 to 2.5    Falls    Clavicle fracture    CKD (chronic kidney disease), stage III    Iron deficiency anemia    H/O epistaxis    Vertigo    GI bleed    S/P TVR (tricuspid valve repair)    S/P ventricular assist device    S/P endoscopy    H/O tracheostomy      ============================IMAGING STUDIES=========================  Xray Chest 1 View- PORTABLE-Urgent (Xray Chest 1 View- PORTABLE-Urgent .) (12.14.21 @ 02:39) >  FINDINGS:  Sternotomy wires. Loop recorder. Partially visualized LVAD device   overlying the left lower chest. Right IJ approach central venous   catheter, with its tip in the SVC.  The heart is normal in size.  Small right base infiltrate.  There is no pneumothorax or pleural effusion.  There are no acute osseous abnormalities.  IMPRESSION:  Small right infiltrate.    Transthoracic Echocardiogram (21 @ 10:31) >  Conclusions:  1. Tethered mitral valve leaflets with normal opening.  Moderate mitral regurgitation.  2. The aortic valve opens partially with every observed  beat.  Mild continuious aortic regurgitation.  3. Severe global left ventricular systolic dysfunction.  4. A Heartmate 2 LVAD is present in the left ventricle at  the speed of 9200RPM. Theaortic valve opens partially with  every observed beat. There is mild continuious aortic  regurgitation.  The LVAD cannula is visualized in the apex  with pulsitile velocities less than 1.2m/s. The septum  appears midline.  5. Normal right ventricular size with decreased right  ventricular systolic function.    ============================MEDICATIONS=========================  MEDICATIONS  (STANDING):  aspirin  chewable 81 milliGRAM(s) Oral daily  cefepime   IVPB      cefepime   IVPB 1000 milliGRAM(s) IV Intermittent every 8 hours  dexAMETHasone  Injectable 6 milliGRAM(s) IV Push daily  enoxaparin Injectable 40 milliGRAM(s) SubCutaneous every 12 hours  methimazole 10 milliGRAM(s) Oral daily  metoclopramide 5 milliGRAM(s) Oral three times a day  mirtazapine 7.5 milliGRAM(s) Oral at bedtime  multiple electrolytes Injection Type 1 1000 milliLiter(s) (45 mL/Hr) IV Continuous <Continuous>  pantoprazole  Injectable 40 milliGRAM(s) IV Push every 12 hours  remdesivir  IVPB   IV Intermittent   remdesivir  IVPB 100 milliGRAM(s) IV Intermittent every 24 hours  sertraline 100 milliGRAM(s) Oral daily  sodium chloride 0.9% lock flush 3 milliLiter(s) IV Push every 8 hours  vancomycin  IVPB 1000 milliGRAM(s) IV Intermittent every 12 hours  vancomycin  IVPB      vasopressin Infusion 0.04 Unit(s)/Min (2.4 mL/Hr) IV Continuous <Continuous>    MEDICATIONS  (PRN):      =============================ASSESSMENT===========================   Admitted for COVID re-infection on    Altered mental status   Hypotensive   CKD stage 3   Hyperthyroidism    =============================NEUROLOGY============================  Readmitted with altered mental status, continue close monitoring of neuro status   C/w Mirtazapine and Sertraline     ==============================RESPIRATORY========================  Pt is on 3L nasal cannula   Encourage incentive spirometry, continue pulse ox monitoring, follow ABGs   Continue bronchodilators     ============================CARDIOVASCULAR======================  Stage D Nonischemic Cardiomyopathy s/p HM2 on 2017; LVAD settings 9200, flow 5.6  TTE on : EF 10%, severe global LV systolic dysfunction, normal RV size with decreased RV systolic function   Pressor support with IV Vasopressin for hypotension   Will discuss starting inotrope with heart failure   Continue invasive hemodynamic monitoring  ASA for CAD     =====================RENAL===================   CKD stage 3   Monitor I/Os and electrolytes    ====================GASTROINTESTINAL===================  On DASH/TLC diet, tolerating  Continue GI prophylaxis with Protonix  Continue Reglan for nausea   Sybil drain in place, monitor output     =========================ENDOCRINE==========================  Continue monitoring blood glucose closely for need to initiate sliding scale   Hyperthyroidism, c/w methimazole     ========================HEMATOLOGIC/ONCOLOGIC====================  Follow CBC in AM  VTE prophylaxis with Lovenox     ============================INFECTIOUS DISEASE========================  COVID + on , c/w Dexamethasone, Remdesivir, and Monoclonal antibodies   Empiric coverage with Cefepime and Vancomycin   Monitor for fever / leukocytosis      Pt is on GI & DVT prophylaxis        Pertinent clinical, laboratory, radiographic, hemodynamic, echocardiographic, respiratory data, microbiologic data and chart were reviewed and analyzed frequently throughout the course of the day and night  Patient seen, examined and plan discussed with CT Surgery / CTICU team during rounds.    I spent 30 minutes at bedside providing critical care from 7am to 5pm.    By signing my name below, I, Agnieszka Cherry, attest that this documentation has been prepared under the direction and in the presence of Jaret Schaffer DO  Electronically Signed: Cesia Shaffer. 21 @ 13:54   RICKY HAMPTON                     MRN-[id number]    HPI:  65M PMH ACC/AHA stage D HF due to NICM HM2 LVAD , TV annuloplasty ring 17 as destination therapy due to severe peripheral artery disease with significant stenosis  SIADH, Depression, CKD-3 with hyperkalemia, past E. coli UTIs, driveline drainage (21) and COVID-19 (back in 2020). Recurrent syncope post LVAD s/p ILR, and chronic abdominal pain and was noted to have a prolong hospital course (-). During that time he has multiple infections and now remains on suppressive antibiotics. Underwent SMA stent with Vascular in 10/2021, now tolerating PO diet, perc sybil drain placement and mutiple GI bleeds. Ultimately was trach and discharged to rehab to get stronger. Patient returned from rehab for trach decannulation, which was removed on .    Now presented from [facility] on  with AMS, hypotensive, tachycardic found to be COVID (+). Pt is lethargic and mumbling words.      (13 Dec 2021 01:06)      Hospital Course:    rehab   admit to stepdown for decannulation    discharge home    syncope, VT at home, started on amio    COVID +, AMS, hypotensive, tx from [facility]     VITAL SIGNS:  Vital Signs Last 24 Hrs  T(C): 36.4 (14 Dec 2021 13:00), Max: 36.8 (14 Dec 2021 04:00)  T(F): 97.5 (14 Dec 2021 13:00), Max: 98.2 (14 Dec 2021 04:00)  HR: 64 (14 Dec 2021 13:30) (48 - 87)  BP: --  BP(mean): --  RR: 21 (14 Dec 2021 13:30) (17 - 34)  SpO2: 100% (14 Dec 2021 13:30) (84% - 100%)    =========================I&Os=========================  I/Os:  I&O's Detail    13 Dec 2021 07:01  -  14 Dec 2021 07:00  --------------------------------------------------------  IN:    Albumin 5%  - 250 mL: 250 mL    IV PiggyBack: 950 mL    multiple electrolytes Injection Type 1.: 1530 mL    Norepinephrine: 10.4 mL    Phenylephrine: 104.4 mL    Vasopressin: 37.8 mL  Total IN: 2882.6 mL    OUT:    Drain (mL): 125 mL    Voided (mL): 700 mL  Total OUT: 825 mL    Total NET: 2057.6 mL      14 Dec 2021 07:01  -  14 Dec 2021 13:54  --------------------------------------------------------  IN:    IV PiggyBack: 250 mL    multiple electrolytes Injection Type 1.: 315 mL    Oral Fluid: 180 mL    Vasopressin: 16.8 mL  Total IN: 761.8 mL    OUT:    Drain (mL): 0 mL    Voided (mL): 500 mL  Total OUT: 500 mL    Total NET: 261.8 mL          CAPILLARY BLOOD GLUCOSE          ============================LABS=========================                        8.7    11.74 )-----------( 128      ( 14 Dec 2021 01:04 )             27.2     12-14    131<L>  |  97  |  11  ----------------------------<  209<H>  4.2   |  24  |  0.53    Ca    8.9      14 Dec 2021 01:04  Phos  3.7     12-14  Mg     2.0     12-14    TPro  6.9  /  Alb  3.0<L>  /  TBili  0.4  /  DBili  x   /  AST  28  /  ALT  18  /  AlkPhos  109  12-14    LIVER FUNCTIONS - ( 14 Dec 2021 01:04 )  Alb: 3.0 g/dL / Pro: 6.9 g/dL / ALK PHOS: 109 U/L / ALT: 18 U/L / AST: 28 U/L / GGT: x           PT/INR - ( 14 Dec 2021 01:04 )   PT: 15.3 sec;   INR: 1.29 ratio         PTT - ( 14 Dec 2021 01:04 )  PTT:32.4 sec  ABG - ( 14 Dec 2021 09:56 )  pH, Arterial: 7.34  pH, Blood: x     /  pCO2: 47    /  pO2: 160   / HCO3: 25    / Base Excess: -0.5  /  SaO2: 99.9              Urinalysis Basic - ( 13 Dec 2021 02:27 )    Color: Yellow / Appearance: Clear / S.027 / pH: x  Gluc: x / Ketone: Small  / Bili: Negative / Urobili: Negative   Blood: x / Protein: 100 / Nitrite: Negative   Leuk Esterase: Negative / RBC: 1 /hpf / WBC 2 /HPF   Sq Epi: x / Non Sq Epi: 0 /hpf / Bacteria: Negative        ===========================PMHX&PSHx==========================  PAST MEDICAL & SURGICAL HISTORY:  CHF (congestive heart failure)    CAD (coronary artery disease)    Depression    Pleural effusion    History of 2019 novel coronavirus disease (COVID-19)  2020    Hemorrhoids    Bleeding hemorrhoids    Peripheral arterial disease    Claudication    BPH with urinary obstruction    ACC/AHA stage D systolic heart failure    Anticoagulation goal of INR 2.0 to 2.5    Falls    Clavicle fracture    CKD (chronic kidney disease), stage III    Iron deficiency anemia    H/O epistaxis    Vertigo    GI bleed    S/P TVR (tricuspid valve repair)    S/P ventricular assist device    S/P endoscopy    H/O tracheostomy      ============================IMAGING STUDIES=========================  Xray Chest 1 View- PORTABLE-Urgent (Xray Chest 1 View- PORTABLE-Urgent .) (12.14.21 @ 02:39) >  FINDINGS:  Sternotomy wires. Loop recorder. Partially visualized LVAD device   overlying the left lower chest. Right IJ approach central venous   catheter, with its tip in the SVC.  The heart is normal in size.  Small right base infiltrate.  There is no pneumothorax or pleural effusion.  There are no acute osseous abnormalities.  IMPRESSION:  Small right infiltrate.    Transthoracic Echocardiogram (21 @ 10:31) >  Conclusions:  1. Tethered mitral valve leaflets with normal opening.  Moderate mitral regurgitation.  2. The aortic valve opens partially with every observed  beat.  Mild continuious aortic regurgitation.  3. Severe global left ventricular systolic dysfunction.  4. A Heartmate 2 LVAD is present in the left ventricle at  the speed of 9200RPM. Theaortic valve opens partially with  every observed beat. There is mild continuious aortic  regurgitation.  The LVAD cannula is visualized in the apex  with pulsitile velocities less than 1.2m/s. The septum  appears midline.  5. Normal right ventricular size with decreased right  ventricular systolic function.    ============================MEDICATIONS=========================  MEDICATIONS  (STANDING):  aspirin  chewable 81 milliGRAM(s) Oral daily  cefepime   IVPB      cefepime   IVPB 1000 milliGRAM(s) IV Intermittent every 8 hours  dexAMETHasone  Injectable 6 milliGRAM(s) IV Push daily  enoxaparin Injectable 40 milliGRAM(s) SubCutaneous every 12 hours  methimazole 10 milliGRAM(s) Oral daily  metoclopramide 5 milliGRAM(s) Oral three times a day  mirtazapine 7.5 milliGRAM(s) Oral at bedtime  multiple electrolytes Injection Type 1 1000 milliLiter(s) (45 mL/Hr) IV Continuous <Continuous>  pantoprazole  Injectable 40 milliGRAM(s) IV Push every 12 hours  remdesivir  IVPB   IV Intermittent   remdesivir  IVPB 100 milliGRAM(s) IV Intermittent every 24 hours  sertraline 100 milliGRAM(s) Oral daily  sodium chloride 0.9% lock flush 3 milliLiter(s) IV Push every 8 hours  vancomycin  IVPB 1000 milliGRAM(s) IV Intermittent every 12 hours  vancomycin  IVPB      vasopressin Infusion 0.04 Unit(s)/Min (2.4 mL/Hr) IV Continuous <Continuous>    MEDICATIONS  (PRN):      =============================ASSESSMENT===========================   Admitted for COVID re-infection on    Altered mental status   Hypotensive ,on pressors   CKD stage 3   Hyperthyroidism    =============================NEUROLOGY============================  Readmitted with altered mental status, continue close monitoring of neuro status   C/w Mirtazapine and Sertraline     ==============================RESPIRATORY========================  Pt is on 3L nasal cannula ,monitoring ABGs  Encourage incentive spirometry, continue pulse ox monitoring, follow ABGs   Continue bronchodilators     ============================CARDIOVASCULAR======================  Stage D Nonischemic Cardiomyopathy s/p HM2 on 2017; LVAD settings 9200, flow 5.6  TTE on : EF 10%, severe global LV systolic dysfunction, normal RV size with decreased RV systolic function   Pressor support with IV Vasopressin for hypotension   Will discuss starting inotrope with heart failure   Continue invasive hemodynamic monitoring  ASA for CAD     =====================RENAL===================   CKD stage 3   Monitor I/Os and electrolytes    ====================GASTROINTESTINAL===================  On DASH/TLC diet, tolerating  Continue GI prophylaxis with Protonix  Continue Reglan for nausea   Sybil drain in place, monitor output     =========================ENDOCRINE==========================  Continue monitoring blood glucose closely for need to initiate sliding scale   Hyperthyroidism, c/w methimazole     ========================HEMATOLOGIC/ONCOLOGIC====================  Follow CBC in AM  VTE prophylaxis with Lovenox     ============================INFECTIOUS DISEASE========================  COVID + on , c/w Dexamethasone, Remdesivir, and Monoclonal antibodies   Empiric coverage with Cefepime and Vancomycin   Monitor for fever / leukocytosis  ID f/u    Pt is on GI & DVT prophylaxis        Pertinent clinical, laboratory, radiographic, hemodynamic, echocardiographic, respiratory data, microbiologic data and chart were reviewed and analyzed frequently throughout the course of the day and night  Patient seen, examined and plan discussed with CT Surgery / CTICU team during rounds.    I spent 30 minutes at bedside providing critical care from 7am to 5pm.    By signing my name below, I, Agnieszka Cherry, attest that this documentation has been prepared under the direction and in the presence of Jaret Schaffer DO  Electronically Signed: Cesia Shaffer. 21 @ 13:54

## 2021-12-14 NOTE — PROGRESS NOTE ADULT - ATTENDING COMMENTS
stable overnight.   remains on vaso, not escalating. oxygen requirements stable at 3L  meds: s/p monoclonal, vaso .04/min, fluids 45/hr, dexa 6 IV daily, PPI bid, lovanox 40 SQ Q12, asa, cefipime/vanco, rendosovir 1st dose given. methimazole, reglan, mertazapine, sertraline.   LVAD: 9200, 5.4, PI 6.6, (no speed drops).   HR 60-70, MAPs 70s, CVP 7, Tlow 96.5  I/O: +2060.   12-14  131<L>  |  97  |  11  ----------------------------<  209<H>  4.2   |  24  |  0.53    Ca    8.9      14 Dec 2021 01:04  Phos  3.7     12-14  Mg     2.0     12-14  TPro  6.9  /  Alb  3.0<L>  /  TBili  0.4  /  DBili  x   /  AST  28  /  ALT  18  /  AlkPhos  109  12-14                        8.7    11.74 )-----------( 128      ( 14 Dec 2021 01:04 )             27.2   , 266  , 835  Ddimer 1271, 3650.   Clinically stable. Slow decline in inflammatory biomarkers. O2 requirement stable.   Continue supportive care and COVID therapies as per Dr Prater.   Wean vaso for MAPs > 70  Zi Werner stable overnight. AoX3  remains on vaso, not escalating. oxygen requirements stable at 3L  meds: s/p monoclonal, vaso .04/min, fluids 45/hr, dexa 6 IV daily, PPI bid, lovanox 40 SQ Q12, asa, cefipime/vanco, rendosovir 1st dose given. methimazole, reglan, mertazapine, sertraline.   LVAD: 9200, 5.4, PI 6.6, (no speed drops).   HR 60-70, MAPs 70s, CVP 7, Tlow 96.5  I/O: +2060.   12-14  131<L>  |  97  |  11  ----------------------------<  209<H>  4.2   |  24  |  0.53    Ca    8.9      14 Dec 2021 01:04  Phos  3.7     12-14  Mg     2.0     12-14  TPro  6.9  /  Alb  3.0<L>  /  TBili  0.4  /  DBili  x   /  AST  28  /  ALT  18  /  AlkPhos  109  12-14                        8.7    11.74 )-----------( 128      ( 14 Dec 2021 01:04 )             27.2   , 266  , 835  Ddimer 1271, 3650.   Clinically stable. Slow decline in inflammatory biomarkers. O2 requirement stable.   Continue supportive care and COVID therapies as per Dr Prater.   Wean vaso for MAPs > 70  Zi Werner

## 2021-12-14 NOTE — PROGRESS NOTE ADULT - PROBLEM SELECTOR PLAN 1
- Continue with speed of 9200  - Daily LDH  - Wean vasopressors as above to maintain MAP 70-80  - Resume ASA 81mg daily.

## 2021-12-14 NOTE — PROGRESS NOTE ADULT - SUBJECTIVE AND OBJECTIVE BOX
INFECTIOUS DISEASES FOLLOW UP-- Anitra Prater  [phone]    This is a follow up note for this  66yMale with  Sepsis COVID+  Was hypothermic this morning but feeling much better this afternoon        ROS:  CONSTITUTIONAL:  good appetite awake/alert    Allergies    Amiodarone Hydrochloride (Other (Severe))    Intolerances        ANTIBIOTICS/RELEVANT:  antimicrobials  cefepime   IVPB      cefepime   IVPB 1000 milliGRAM(s) IV Intermittent every 8 hours  remdesivir  IVPB   IV Intermittent   remdesivir  IVPB 100 milliGRAM(s) IV Intermittent every 24 hours  vancomycin    Solution 125 milliGRAM(s) Oral every 12 hours  vancomycin  IVPB 1000 milliGRAM(s) IV Intermittent every 12 hours  vancomycin  IVPB        immunologic:    OTHER:  aspirin  chewable 81 milliGRAM(s) Oral daily  dexAMETHasone  Injectable 6 milliGRAM(s) IV Push daily  enoxaparin Injectable 40 milliGRAM(s) SubCutaneous every 12 hours  methimazole 10 milliGRAM(s) Oral daily  metoclopramide 5 milliGRAM(s) Oral three times a day  mirtazapine 7.5 milliGRAM(s) Oral at bedtime  multiple electrolytes Injection Type 1 1000 milliLiter(s) IV Continuous <Continuous>  pantoprazole  Injectable 40 milliGRAM(s) IV Push every 12 hours  sertraline 100 milliGRAM(s) Oral daily  sodium chloride 0.9% lock flush 3 milliLiter(s) IV Push every 8 hours  vasopressin Infusion 0.04 Unit(s)/Min IV Continuous <Continuous>      Objective:  Vital Signs Last 24 Hrs  T(C): 36.3 (14 Dec 2021 17:00), Max: 36.8 (14 Dec 2021 04:00)  T(F): 97.4 (14 Dec 2021 17:00), Max: 98.2 (14 Dec 2021 04:00)  HR: 61 (14 Dec 2021 18:15) (48 - 84)  BP: --  BP(mean): --  RR: 20 (14 Dec 2021 18:15) (19 - 34)  SpO2: 100% (14 Dec 2021 18:15) (84% - 100%)    PHYSICAL EXAM:  Constitutional:no acute distress nasal oxygen  Eyes:ALONSO, EOMI  Ear/Nose/Throat: no oral lesions, 	  Respiratory: BS audible bilateral  Cardiovascular: [id number] sounds  Gastrointestinal:soft, (+) BS, dark bilious fluid in cholecystotomy bag  Extremities:no e/e/c  No Lymphadenopathy  IV sites not inflammed.    LABS:                        8.7    11.74 )-----------( 128      ( 14 Dec 2021 01:04 )             27.2     12-14    131<L>  |  97  |  11  ----------------------------<  209<H>  4.2   |  24  |  0.53    Ca    8.9      14 Dec 2021 01:04  Phos  3.7     -  Mg     2.0     -    TPro  6.9  /  Alb  3.0<L>  /  TBili  0.4  /  DBili  x   /  AST  28  /  ALT  18  /  AlkPhos  109  -14    PT/INR - ( 14 Dec 2021 01:04 )   PT: 15.3 sec;   INR: 1.29 ratio         PTT - ( 14 Dec 2021 01:04 )  PTT:32.4 sec  Urinalysis Basic - ( 13 Dec 2021 02:27 )    Color: Yellow / Appearance: Clear / S.027 / pH: x  Gluc: x / Ketone: Small  / Bili: Negative / Urobili: Negative   Blood: x / Protein: 100 / Nitrite: Negative   Leuk Esterase: Negative / RBC: 1 /hpf / WBC 2 /HPF   Sq Epi: x / Non Sq Epi: 0 /hpf / Bacteria: Negative        MICROBIOLOGY:            RECENT CULTURES:   @ 05:50  .Blood Blood-Peripheral  --  --  --    No growth to date.  --      RADIOLOGY & ADDITIONAL STUDIES:    < from: Xray Chest 1 View- PORTABLE-Urgent (Xray Chest 1 View- PORTABLE-Urgent .) (21 @ 02:39) >  IMPRESSION:  Small right infiltrate.    < end of copied text >

## 2021-12-14 NOTE — PROGRESS NOTE ADULT - ASSESSMENT
66yo man with a history of VAD palcement, history of COVID infeciton in April 2020, history of a prolonged hospitalization in 2021 with recurrent sepsis, pneumonia, intubated/trach x2 cycles , chronic gall bladder disease s/p perc dawn, SMA ischemia reuiring a stent placement, cachecxia who was discharged to rehab but is presented from [facility] on 12/13 with AMS, hypotensive, tachycardic found to be COVID (+). Pt is lethargic and mumbling words required ICU admission and pressor support.    More awake, alert, interactive   oxygen nasal requirements with reasonable saturation    COVID infection-   confirmed second bout of COVID by documented PCR testing  Ideally, would be interested in sequencing the virus to determine strain (omicron vs delta)  Received a dose of monoclonal antibodies Regeneron product   Day 2 of Remdesivir/Dexamethasone  Refuses most vaccines- but will need to wait three months  to receive COVID vaccine series post monoclonal  Will administer flu/pneumonia vaccines this admission prior to discharge  Send LDH, CRP,ESR, Ferritin QOD to trend    Repeat CXR shows a small RLL infiltrate patient is receiving Cefepime and Vancomycin  check Vanco trough prior to next dose    Leukocytosis:  History of recurrent pneumonia and bacteremias   With stenotrophomonas in prior sputum cultures and enterobacter in blood in October 2021  Blood cultures sent 12/13 are NGTD    Prior severe C.diff infection  Will begin pre-emptive oral Vanco 125mg bid    Morris Prater MD  [phone]  After 5pm/weekends [phone]

## 2021-12-14 NOTE — PROGRESS NOTE ADULT - SUBJECTIVE AND OBJECTIVE BOX
Subjective:    Medications:  aspirin  chewable 81 milliGRAM(s) Oral daily  cefepime   IVPB      cefepime   IVPB 1000 milliGRAM(s) IV Intermittent every 8 hours  dexAMETHasone  Injectable 6 milliGRAM(s) IV Push daily  enoxaparin Injectable 40 milliGRAM(s) SubCutaneous every 12 hours  methimazole 10 milliGRAM(s) Oral daily  metoclopramide 5 milliGRAM(s) Oral three times a day  mirtazapine 7.5 milliGRAM(s) Oral at bedtime  multiple electrolytes Injection Type 1 1000 milliLiter(s) IV Continuous <Continuous>  pantoprazole  Injectable 40 milliGRAM(s) IV Push every 12 hours  remdesivir  IVPB   IV Intermittent   remdesivir  IVPB 100 milliGRAM(s) IV Intermittent every 24 hours  sertraline 100 milliGRAM(s) Oral daily  sodium chloride 0.9% lock flush 3 milliLiter(s) IV Push every 8 hours  vancomycin  IVPB 1000 milliGRAM(s) IV Intermittent every 12 hours  vancomycin  IVPB      vasopressin Infusion 0.04 Unit(s)/Min IV Continuous <Continuous>      Physical Exam:    Vitals:  Vital Signs Last 24 Hours  T(C): 35.8 (21 @ 09:30), Max: 36.8 (21 @ 04:00)  HR: 66 (21 @ 10:00) (48 - 87)  BP: --  RR: 20 (21 @ 10:00) (17 - 34)  SpO2: 100% (21 @ 10:00) (84% - 100%)    Weight in k.2 ( @ 00:00)    I&O's Summary    13 Dec 2021 07:  -  14 Dec 2021 07:00  --------------------------------------------------------  IN: 2882.6 mL / OUT: 825 mL / NET: 2057.6 mL    14 Dec 2021 07:  -  14 Dec 2021 10:47  --------------------------------------------------------  IN: 344.8 mL / OUT: 0 mL / NET: 344.8 mL        Tele:    General: No distress. Comfortable.  HEENT: EOM intact.  Neck: Neck supple. JVP not elevated. No masses  Chest: Clear to auscultation bilaterally  CV: Normal S1 and S2. No murmurs, rub, or gallops. Radial pulses normal.  Abdomen: Soft, non-distended, non-tender  Skin: No rashes or skin breakdown  Neurology: Alert and oriented times three. Sensation intact  Psych: Affect normal    Labs:                        8.7    11.74 )-----------( 128      ( 14 Dec 2021 01:04 )             27.2     -    131<L>  |  97  |  11  ----------------------------<  209<H>  4.2   |  24  |  0.53    Ca    8.9      14 Dec 2021 01:04  Phos  3.7     -  Mg     2.0         TPro  6.9  /  Alb  3.0<L>  /  TBili  0.4  /  DBili  x   /  AST  28  /  ALT  18  /  AlkPhos  109  12-    PT/INR - ( 14 Dec 2021 01:04 )   PT: 15.3 sec;   INR: 1.29 ratio         PTT - ( 14 Dec 2021 01:04 )  PTT:32.4 sec          Lactate Dehydrogenase, Serum: 196 U/L ( @ 01:04)  Lactate Dehydrogenase, Serum: 835 U/L ( @ 01:42)       Subjective:  - av switched to levo then vaso, currently on decreasing dose of vaso  - hypothermic with oral T low of 35C, 35.8 rectal. warming blanket on  - remains on 3L NC  - denies SOB, lightheadedness, abdominal pain  - good appetite    Medications:  aspirin  chewable 81 milliGRAM(s) Oral daily  cefepime   IVPB      cefepime   IVPB 1000 milliGRAM(s) IV Intermittent every 8 hours  dexAMETHasone  Injectable 6 milliGRAM(s) IV Push daily  enoxaparin Injectable 40 milliGRAM(s) SubCutaneous every 12 hours  methimazole 10 milliGRAM(s) Oral daily  metoclopramide 5 milliGRAM(s) Oral three times a day  mirtazapine 7.5 milliGRAM(s) Oral at bedtime  multiple electrolytes Injection Type 1 1000 milliLiter(s) IV Continuous <Continuous>  pantoprazole  Injectable 40 milliGRAM(s) IV Push every 12 hours  remdesivir  IVPB   IV Intermittent   remdesivir  IVPB 100 milliGRAM(s) IV Intermittent every 24 hours  sertraline 100 milliGRAM(s) Oral daily  sodium chloride 0.9% lock flush 3 milliLiter(s) IV Push every 8 hours  vancomycin  IVPB 1000 milliGRAM(s) IV Intermittent every 12 hours  vancomycin  IVPB      vasopressin Infusion 0.04 Unit(s)/Min IV Continuous <Continuous>      Physical Exam:    Vitals:  Vital Signs Last 24 Hours  T(C): 35.8 (21 @ 09:30), Max: 36.8 (21 @ 04:00)  HR: 66 (21 @ 10:00) (48 - 87)  BP: MAPs 72-95  RR: 20 (21 @ 10:00) (17 - 34)  SpO2: 100% (21 @ 10:00) (84% - 100%)    LVAD Interrogation:  HM2  Speed 9200  Flow 5.4  Power 5.7  PI 6.6  One PI event without speed drops  DLES dressing C/D/I    Weight in k.2 ( @ 00:00)    I&O's Summary    13 Dec 2021 07:01  -  14 Dec 2021 07:00  --------------------------------------------------------  IN: 2882.6 mL / OUT: 825 mL / NET: 2057.6 mL    14 Dec 2021 07:01  -  14 Dec 2021 10:47  --------------------------------------------------------  IN: 344.8 mL / OUT: 0 mL / NET: 344.8 mL    Tele: SR, 4 beats WCT    General: Frail. Cachetic. No distress. Comfortable.  HEENT: EOM intact.  Neck: Neck supple. JVP not elevated. No masses  Chest: Diminished bilateral bases.  CV: LVAD hum. palpable radial pulse. No LE edema.  Abdomen: Soft, non-distended, non-tender. perc dawn drain with bilious output  Skin: No rashes or skin breakdown. abdominal dressing C/D/I. warm peripherally.  Neurology: Alert and oriented times three. Sensation intact  Psych: Affect normal    Labs:                        8.7    11.74 )-----------( 128      ( 14 Dec 2021 01:04 )             27.2     12-14    131<L>  |  97  |  11  ----------------------------<  209<H>  4.2   |  24  |  0.53    Ca    8.9      14 Dec 2021 01:04  Phos  3.7     -  Mg     2.0     -    TPro  6.9  /  Alb  3.0<L>  /  TBili  0.4  /  DBili  x   /  AST  28  /  ALT  18  /  AlkPhos  109  12-14    PT/INR - ( 14 Dec 2021 01:04 )   PT: 15.3 sec;   INR: 1.29 ratio      PTT - ( 14 Dec 2021 01:04 )  PTT:32.4 sec    Lactate Dehydrogenase, Serum: 196 U/L ( @ 01:04)  Lactate Dehydrogenase, Serum: 835 U/L ( @ 01:42)

## 2021-12-14 NOTE — PROGRESS NOTE ADULT - ASSESSMENT
Mr. Quispe is a 67 y/o M with a history of Stage D NICM s/p HM2 LVAD in 9/2017 at  (due to severe PAD) with TV ring, multiple GI bleeds, thus maintained off AC, CKD 3prior COVID-19 infection in 4/2020, recurrent syncope post LVAD s/p ILR, s/p prolonged complex hospitalization from 6/14-11/16/2021 initially admitted with abdominal pain complicated by GIB, respiratory failure s/p trach, multiple infections, s/p cholecystotomy tube and SMA stent 10/2021, ultimately discharged to [facility] rehab and then returned to [facility] for trach decannulation 12/2 who now presents with recurrent COVID-19 infection in shock. He has elevated inflammatory markers with , fibrinogen 668, rising  from 251 in 12/6. Additionally of note his ddimer is elevated at 3600 as is his procalcitonin at 4.48. Blood cultures sent with NGTD. His inflammatory markers are improving. He has now received monoclonal antibodies and was started on remdesivir and dexamethasone. His oxygen requirements are stable, he is continuing to require 3L NC. His vasopressor requirement is slowly decreasing and he is maintaining a MAP 70-80. CVP initially low, now improved to 7. HIs renal function is normal. His leukocytosis is improving. His LVAD is functioning well without s/s of pump malfunction.

## 2021-12-14 NOTE — PROGRESS NOTE ADULT - PROBLEM SELECTOR PLAN 2
- Dr. Prater, ID following  - COVID PCR positive 12/13  - s/p monoclonal antibodies  - Continue course of remdesivir and dexamethasone  - BCx NGTD

## 2021-12-15 NOTE — PATIENT PROFILE ADULT - PATIENT REPRESENTATIVE: ( YOU CAN CHOOSE ANY PERSON THAT CAN ASSIST YOU WITH YOUR HEALTH CARE PREFERENCES, DOES NOT HAVE TO BE A SPOUSE, IMMEDIATE FAMILY OR SIGNIFICANT OTHER/PARTNER)
declines I have personally seen and examined this patient.  I have fully participated in the care of this patient. I have reviewed all pertinent clinical information, including history, physical exam, plan and the Resident’s note and agree except as noted.

## 2021-12-15 NOTE — PROGRESS NOTE ADULT - SUBJECTIVE AND OBJECTIVE BOX
INFECTIOUS DISEASES FOLLOW UP-- Anitra Prater  [phone]    This is a follow up note for this  66yMale with  Sepsis, recurrent GNR bacteremia and second confirmed bout of COVID infection        ROS:  CONSTITUTIONAL:  No fever,   CARDIOVASCULAR:  No chest pain or palpitations  RESPIRATORY:  No dyspnea  GASTROINTESTINAL:  No nausea, vomiting, diarrhea, mild abdominal pain  GENITOURINARY:  No dysuria  NEUROLOGIC:  No headache,     Allergies    Amiodarone Hydrochloride (Other (Severe))    Intolerances        ANTIBIOTICS/RELEVANT:  antimicrobials  cefepime   IVPB 2000 milliGRAM(s) IV Intermittent every 8 hours  remdesivir  IVPB   IV Intermittent   remdesivir  IVPB 100 milliGRAM(s) IV Intermittent every 24 hours  vancomycin    Solution 125 milliGRAM(s) Oral every 12 hours    immunologic:    OTHER:  aspirin  chewable 81 milliGRAM(s) Oral daily  chlorhexidine 2% Cloths 1 Application(s) Topical <User Schedule>  dexAMETHasone  Injectable 6 milliGRAM(s) IV Push daily  dextrose 50% Injectable 50 milliLiter(s) IV Push every 15 minutes  dextrose 50% Injectable 25 milliLiter(s) IV Push every 15 minutes  enoxaparin Injectable 40 milliGRAM(s) SubCutaneous every 12 hours  insulin lispro (ADMELOG) corrective regimen sliding scale   SubCutaneous three times a day before meals  insulin lispro (ADMELOG) corrective regimen sliding scale   SubCutaneous at bedtime  methimazole 10 milliGRAM(s) Oral daily  metoclopramide 5 milliGRAM(s) Oral three times a day  mirtazapine 7.5 milliGRAM(s) Oral at bedtime  pantoprazole  Injectable 40 milliGRAM(s) IV Push every 12 hours  sertraline 100 milliGRAM(s) Oral daily  sodium chloride 0.9% lock flush 3 milliLiter(s) IV Push every 8 hours  vasopressin Infusion 0.04 Unit(s)/Min IV Continuous <Continuous>      Objective:  Vital Signs Last 24 Hrs  T(C): 36.4 (15 Dec 2021 16:00), Max: 36.5 (14 Dec 2021 20:00)  T(F): 97.6 (15 Dec 2021 16:00), Max: 97.7 (14 Dec 2021 20:00)  HR: 77 (15 Dec 2021 19:00) (53 - 83)  BP: --  BP(mean): --  RR: 21 (15 Dec 2021 19:00) (15 - 34)  SpO2: 100% (15 Dec 2021 19:00) (98% - 100%)    PHYSICAL EXAM:  Constitutional:no acute distress continues on nasal oxygen with good oxygenation  Eyes:ALONSO, EOMI  Ear/Nose/Throat: no oral lesions, bandage over old trach site	  Respiratory: audible bilat anteriorly  Cardiovascular: S1S2 VAD sounds  Gastrointestinal: slight tenderness RUQ cholecystotomy tube remains  Extremities:no e/e/c  No Lymphadenopathy  IV sites not inflammed.    LABS:                        7.9    15.74 )-----------( 137      ( 15 Dec 2021 00:43 )             24.2     12-15    131<L>  |  96  |  16  ----------------------------<  288<H>  4.0   |  24  |  0.53    Ca    8.8      15 Dec 2021 00:43  Phos  2.0     12-15  Mg     2.2     12-15    TPro  6.6  /  Alb  2.7<L>  /  TBili  0.2  /  DBili  x   /  AST  16  /  ALT  15  /  AlkPhos  106  12-15    PT/INR - ( 15 Dec 2021 00:43 )   PT: 14.7 sec;   INR: 1.24 ratio         PTT - ( 15 Dec 2021 00:43 )  PTT:30.4 sec      MICROBIOLOGY:            RECENT CULTURES:  12-13 @ 05:50  .Blood Blood-Peripheral  --  --  --    Growth in aerobic bottle: Gram Negative Rods  --      RADIOLOGY & ADDITIONAL STUDIES:    < from: Xray Chest 1 View- PORTABLE-Urgent (Xray Chest 1 View- PORTABLE-Urgent .) (12.15.21 @ 05:19) >  IMPRESSION:  Right internal jugular venous catheter tip is in the SVC. Clear lungs.    < end of copied text >

## 2021-12-15 NOTE — DIETITIAN INITIAL EVALUATION ADULT. - ADD RECOMMEND
Recommend liberalize diet to regular to promote increasing PO intake - would consider SLP evaluation to assess adequacy of diet texture/consistency. Consider addition of Multivitamin supplementation pending no existing contraindications; Recommend obtain Vitamin D levels - if low please supplement as appropriate. Monitor electrolytes and replenish PRN - Noted Phos <L> this AM. Monitor PO intake/tolerance, weights, labs, hydration status, bowels, and skin integrity.

## 2021-12-15 NOTE — PATIENT PROFILE ADULT - FALL HARM RISK - HARM RISK INTERVENTIONS
Assistance with ambulation/Assistance OOB with selected safe patient handling equipment/Communicate Risk of Fall with Harm to all staff/Monitor for mental status changes/Reinforce activity limits and safety measures with patient and family/Reorient to person, place and time as needed/Review medications for side effects contributing to fall risk/Sit up slowly, dangle for a short time, stand at bedside before walking/Tailored Fall Risk Interventions/Toileting schedule using arm’s reach rule for commode and bathroom/Use of alarms - bed, chair and/or voice tab/Visual Cue: Yellow wristband and red socks/Bed in lowest position, wheels locked, appropriate side rails in place/Call bell, personal items and telephone in reach/Instruct patient to call for assistance before getting out of bed or chair/Non-slip footwear when patient is out of bed/[place] to call system/Physically safe environment - no spills, clutter or unnecessary equipment/Purposeful Proactive Rounding/Room/bathroom lighting operational, light cord in reach

## 2021-12-15 NOTE — PROGRESS NOTE ADULT - SUBJECTIVE AND OBJECTIVE BOX
Subjective:    Medications:  aspirin  chewable 81 milliGRAM(s) Oral daily  cefepime   IVPB      cefepime   IVPB 1000 milliGRAM(s) IV Intermittent every 8 hours  dexAMETHasone  Injectable 6 milliGRAM(s) IV Push daily  dextrose 50% Injectable 50 milliLiter(s) IV Push every 15 minutes  dextrose 50% Injectable 25 milliLiter(s) IV Push every 15 minutes  enoxaparin Injectable 40 milliGRAM(s) SubCutaneous every 12 hours  insulin regular Infusion 3 Unit(s)/Hr IV Continuous <Continuous>  methimazole 10 milliGRAM(s) Oral daily  metoclopramide 5 milliGRAM(s) Oral three times a day  mirtazapine 7.5 milliGRAM(s) Oral at bedtime  multiple electrolytes Injection Type 1 1000 milliLiter(s) IV Continuous <Continuous>  pantoprazole  Injectable 40 milliGRAM(s) IV Push every 12 hours  remdesivir  IVPB   IV Intermittent   remdesivir  IVPB 100 milliGRAM(s) IV Intermittent every 24 hours  sertraline 100 milliGRAM(s) Oral daily  sodium chloride 0.9% lock flush 3 milliLiter(s) IV Push every 8 hours  vancomycin    Solution 125 milliGRAM(s) Oral every 12 hours  vancomycin  IVPB 1000 milliGRAM(s) IV Intermittent every 12 hours  vancomycin  IVPB      vasopressin Infusion 0.04 Unit(s)/Min IV Continuous <Continuous>      Physical Exam:    Vitals:  Vital Signs Last 24 Hours  T(C): 36.4 (12-15-21 @ 08:00), Max: 36.5 (21 @ 20:00)  HR: 69 (12-15-21 @ 08:00) (52 - 83)  BP: --  RR: 23 (12-15-21 @ 08:00) (16 - 34)  SpO2: 100% (12-15-21 @ 08:00) (98% - 100%)    Weight in k.7 (12-15 @ 00:00)    I&O's Summary    14 Dec 2021 07:01  -  15 Dec 2021 07:00  --------------------------------------------------------  IN: 2746.6 mL / OUT: 960 mL / NET: 1786.6 mL    15 Dec 2021 07:01  -  15 Dec 2021 08:36  --------------------------------------------------------  IN: 45.6 mL / OUT: 0 mL / NET: 45.6 mL    Tele:    General: No distress. Comfortable.  HEENT: EOM intact.  Neck: Neck supple. JVP not elevated. No masses  Chest: Clear to auscultation bilaterally  CV: Normal S1 and S2. No murmurs, rub, or gallops. Radial pulses normal.  Abdomen: Soft, non-distended, non-tender  Skin: No rashes or skin breakdown  Neurology: Alert and oriented times three. Sensation intact  Psych: Affect normal    Labs:                        7.9    15.74 )-----------( 137      ( 15 Dec 2021 00:43 )             24.2     -15    131<L>  |  96  |  16  ----------------------------<  288<H>  4.0   |  24  |  0.53    Ca    8.8      15 Dec 2021 00:43  Phos  2.0     12-15  Mg     2.2     12-15    TPro  6.6  /  Alb  2.7<L>  /  TBili  0.2  /  DBili  x   /  AST  16  /  ALT  15  /  AlkPhos  106  12-15    PT/INR - ( 15 Dec 2021 00:43 )   PT: 14.7 sec;   INR: 1.24 ratio         PTT - ( 15 Dec 2021 00:43 )  PTT:30.4 sec          Lactate Dehydrogenase, Serum: 184 U/L (12-15 @ 00:43)  Lactate Dehydrogenase, Serum: 196 U/L ( @ 01:04)  Lactate Dehydrogenase, Serum: 835 U/L ( @ 01:42)       Subjective:  - Denies complaints  - Pressors being weaned  - per nursing eating well    Medications:  aspirin  chewable 81 milliGRAM(s) Oral daily  cefepime   IVPB      cefepime   IVPB 1000 milliGRAM(s) IV Intermittent every 8 hours  dexAMETHasone  Injectable 6 milliGRAM(s) IV Push daily  dextrose 50% Injectable 50 milliLiter(s) IV Push every 15 minutes  dextrose 50% Injectable 25 milliLiter(s) IV Push every 15 minutes  enoxaparin Injectable 40 milliGRAM(s) SubCutaneous every 12 hours  insulin regular Infusion 3 Unit(s)/Hr IV Continuous <Continuous>  methimazole 10 milliGRAM(s) Oral daily  metoclopramide 5 milliGRAM(s) Oral three times a day  mirtazapine 7.5 milliGRAM(s) Oral at bedtime  multiple electrolytes Injection Type 1 1000 milliLiter(s) IV Continuous <Continuous>  pantoprazole  Injectable 40 milliGRAM(s) IV Push every 12 hours  remdesivir  IVPB   IV Intermittent   remdesivir  IVPB 100 milliGRAM(s) IV Intermittent every 24 hours  sertraline 100 milliGRAM(s) Oral daily  sodium chloride 0.9% lock flush 3 milliLiter(s) IV Push every 8 hours  vancomycin    Solution 125 milliGRAM(s) Oral every 12 hours  vancomycin  IVPB 1000 milliGRAM(s) IV Intermittent every 12 hours  vancomycin  IVPB      vasopressin Infusion 0.04 Unit(s)/Min IV Continuous <Continuous>      Physical Exam:    Vitals:  Vital Signs Last 24 Hours  T(C): 36.4 (12-15-21 @ 08:00), Max: 36.5 (21 @ 20:00)  HR: 69 (12-15-21 @ 08:00) (52 - 83)  BP: MAP 73-81  RR: 23 (12-15-21 @ 08:00) (16 - 34)  SpO2: 100% (12-15-21 @ 08:00) (98% - 100%)    LVAD  HM2  Speed 9200  Flow 5.9  Power 6  PI 5.5    Weight in k.7 (12-15 @ 00:00)    I&O's Summary    14 Dec 2021 07:01  -  15 Dec 2021 07:00  --------------------------------------------------------  IN: 2746.6 mL / OUT: 960 mL / NET: 1786.6 mL    15 Dec 2021 07:01  -  15 Dec 2021 08:36  --------------------------------------------------------  IN: 45.6 mL / OUT: 0 mL / NET: 45.6 mL    Tele: SR    General: Frail. No distress. Comfortable.  HEENT: EOM intact.  Neck: Neck supple. JVP not elevated. No masses  Chest: Clear to auscultation bilaterally  CV: LVAD Hum. palpable radial pulses. No LE edema.  Abdomen: Soft, non-distended, non-tender  Skin: No rashes or skin breakdown. DLES dressing and dawn drain with dressing C/D/I  Neurology: Alert and oriented times three. Sensation intact  Psych: Affect normal    Labs:                        7.9    15.74 )-----------( 137      ( 15 Dec 2021 00:43 )             24.2     12-15    131<L>  |  96  |  16  ----------------------------<  288<H>  4.0   |  24  |  0.53    Ca    8.8      15 Dec 2021 00:43  Phos  2.0     12-15  Mg     2.2     -15    TPro  6.6  /  Alb  2.7<L>  /  TBili  0.2  /  DBili  x   /  AST  16  /  ALT  15  /  AlkPhos  106  12-15    PT/INR - ( 15 Dec 2021 00:43 )   PT: 14.7 sec;   INR: 1.24 ratio      PTT - ( 15 Dec 2021 00:43 )  PTT:30.4 sec    Lactate Dehydrogenase, Serum: 184 U/L (12-15 @ 00:43)  Lactate Dehydrogenase, Serum: 196 U/L ( @ 01:04)  Lactate Dehydrogenase, Serum: 835 U/L ( @ 01:42)

## 2021-12-15 NOTE — PROGRESS NOTE ADULT - ATTENDING COMMENTS
stable overnight. no increase O2 requirement. vaso decreased.   meds; rendisovir day 3/5, dexa 6mg 2/10. insulin, plasmalyte, vaso .01, vonco pO, IV cefipime, IV vanco, asa, lovanox 40 bid, PPI.   LVAD: Flows 5.9, PI 5.5, MAPs 73-81. HR 50-80s, 3L CVP 2-3  CXR: right lower lobe infiltrate seen yesterday now not seen.   12-15  131<L>  |  96  |  16  ----------------------------<  288<H>  4.0   |  24  |  0.53  Ca    8.8      15 Dec 2021 00:43  Phos  2.0     12-15Mg     2.2     12-15    TPro  6.6  /  Alb  2.7<L>  /  TBili  0.2  /  DBili  x   /  AST  16  /  ALT  15  /  AlkPhos  106  12-15                        7.9    15.74 )-----------( 137      ( 15 Dec 2021 00:43 )             24.2   WBC 15.7, 11.7, 16.4.   LDH: 183, 196  , 266  DDimer 1271 , 3600  Fibrinogen 523, 626  continue supportive care.   PT as tolerated.   Zi Werner

## 2021-12-15 NOTE — DIETITIAN INITIAL EVALUATION ADULT. - PERTINENT MEDS FT
MEDICATIONS  (STANDING):  cefepime   IVPB  cefepime   IVPB  dexAMETHasone  Injectable  dextrose 50% Injectable  dextrose 50% Injectable  insulin lispro (ADMELOG) corrective regimen sliding scale  insulin lispro (ADMELOG) corrective regimen sliding scale  methimazole  metoclopramide  pantoprazole  Injectable  remdesivir  IVPB  remdesivir  IVPB  sodium chloride 0.9% lock flush  vancomycin    Solution  vancomycin  IVPB  vancomycin  IVPB  vasopressin Infusion

## 2021-12-15 NOTE — PROGRESS NOTE ADULT - PROBLEM SELECTOR PLAN 4
- Please place central line for CVP and central saturation monitoring  - Encourage PO fluid intake given low CVP, goal 6-8

## 2021-12-15 NOTE — PROGRESS NOTE ADULT - ASSESSMENT
64yo man with a history of VAD palcement, history of COVID infeciton in April 2020, history of a prolonged hospitalization in 2021 with recurrent sepsis, pneumonia, intubated/trach x2 cycles , chronic gall bladder disease s/p perc dawn, SMA ischemia reuiring a stent placement, cachecxia who was discharged to rehab but is presented from [facility] on 12/13 with AMS, hypotensive, tachycardic found to be COVID (+). Pt is lethargic and mumbling words required ICU admission and pressor support.    More awake, alert, interactive   oxygen nasal requirements with reasonable saturation    COVID infection-   confirmed second bout of COVID by documented PCR testing  Ideally, would be interested in sequencing the virus to determine strain (omicron vs delta)  Received a dose of monoclonal antibodies Regeneron product   Day 2 of Remdesivir/Dexamethasone  Refuses most vaccines- but will need to wait three months  to receive COVID vaccine series post monoclonal  Will administer flu/pneumonia vaccines this admission prior to discharge  Send LDH, CRP,ESR, Ferritin QOD to trend    Repeat CXR shows a small RLL infiltrate patient is receiving Cefepime and Vancomycin  check Vanco trough prior to next dose    Leukocytosis:  History of recurrent pneumonia and bacteremias   With stenotrophomonas in prior sputum cultures and enterobacter and serratia in blood in October 2021  Blood cultures sent 12/13 are now growing GNRs with ID/sensitivity pending- growing in 3/3 sets  Increase Cefepime to 2 gram IV q 8hr pending identification of organism and sensitivity  Send repeat blood cultures for evidence of clearing  Consider TTE to evaluate device and RUQ sonogram to reassess- gall bladder  Can discontinue the IV Vancomycin    Prior severe C.diff infection  Will begin pre-emptive oral Vanco 125mg bid    Morris Prater MD  [phone]  After 5pm/weekends [phone]

## 2021-12-15 NOTE — DIETITIAN INITIAL EVALUATION ADULT. - REASON INDICATOR FOR ASSESSMENT
Nutrition Assessment warranted for length of stay with LVAD  Source: EMR, Team; pt disoriented/lethargic

## 2021-12-15 NOTE — DIETITIAN INITIAL EVALUATION ADULT. - OTHER INFO
Unable to conduct a face to face interview or nutrition-focused physical exam due to limited contact restrictions related to Pt's medical condition and isolation precautions along with pt noted to be disoriented/lethargic. Noted upon recent prolonged hospitalization when pt was upgraded to PO diet, he enjoyed Ensure Enlive supplements (strawberry) and Magic cups.    GI: last BM 12/14 - noted loose; no bowel regimen ordered at this time, however is currently on abx course. RN notes pt currently eating relatively well in-house and without difficulty chewing/swallowing.     Weights within the past year: 176.3 pounds (6/15), 135.8 Ibs (10/18), 109.8 Ibs (11-11).   Weight Hx in-house: 126.1Ibs (12/14, bedscale) & 122.7Ibs (12/15, bedscale)

## 2021-12-15 NOTE — DIETITIAN INITIAL EVALUATION ADULT. - ORAL INTAKE PTA/DIET HISTORY
Unable to obtain subjective information from pt at this time. Per chart, NKFA, no micronutrient supplementation PTA. Unknown chewing/swallowing function PTA at this time. upon prior hospitalization was with EN for prolonged period and then advanced to PO diet but was with some dysphagia.

## 2021-12-15 NOTE — PROGRESS NOTE ADULT - SUBJECTIVE AND OBJECTIVE BOX
CRITICAL CARE ATTENDING - CTICU    MEDICATIONS  (STANDING):  aspirin  chewable 81 milliGRAM(s) Oral daily  cefepime   IVPB      cefepime   IVPB 1000 milliGRAM(s) IV Intermittent every 8 hours  dexAMETHasone  Injectable 6 milliGRAM(s) IV Push daily  dextrose 50% Injectable 50 milliLiter(s) IV Push every 15 minutes  dextrose 50% Injectable 25 milliLiter(s) IV Push every 15 minutes  enoxaparin Injectable 40 milliGRAM(s) SubCutaneous every 12 hours  insulin regular Infusion 3 Unit(s)/Hr (3 mL/Hr) IV Continuous <Continuous>  methimazole 10 milliGRAM(s) Oral daily  metoclopramide 5 milliGRAM(s) Oral three times a day  mirtazapine 7.5 milliGRAM(s) Oral at bedtime  multiple electrolytes Injection Type 1 1000 milliLiter(s) (45 mL/Hr) IV Continuous <Continuous>  pantoprazole  Injectable 40 milliGRAM(s) IV Push every 12 hours  remdesivir  IVPB   IV Intermittent   remdesivir  IVPB 100 milliGRAM(s) IV Intermittent every 24 hours  sertraline 100 milliGRAM(s) Oral daily  sodium chloride 0.9% lock flush 3 milliLiter(s) IV Push every 8 hours  vancomycin    Solution 125 milliGRAM(s) Oral every 12 hours  vancomycin  IVPB 1000 milliGRAM(s) IV Intermittent every 12 hours  vancomycin  IVPB      vasopressin Infusion 0.04 Unit(s)/Min (2.4 mL/Hr) IV Continuous <Continuous>                                    7.9    15.74 )-----------( 137      ( 15 Dec 2021 00:43 )             24.2       12-15    131<L>  |  96  |  16  ----------------------------<  288<H>  4.0   |  24  |  0.53    Ca    8.8      15 Dec 2021 00:43  Phos  2.0     12-15  Mg     2.2     12-15    TPro  6.6  /  Alb  2.7<L>  /  TBili  0.2  /  DBili  x   /  AST  16  /  ALT  15  /  AlkPhos  106  12-15      PT/INR - ( 15 Dec 2021 00:43 )   PT: 14.7 sec;   INR: 1.24 ratio         PTT - ( 15 Dec 2021 00:43 )  PTT:30.4 sec        Daily     Daily Weight in k.7 (15 Dec 2021 00:00)       @ 07:01  -  12-15 @ 07:00  --------------------------------------------------------  IN: 2746.6 mL / OUT: 960 mL / NET: 1786.6 mL    12-15 @ 07:01  -  12-15 @ 09:05  --------------------------------------------------------  IN: 90.6 mL / OUT: 120 mL / NET: -29.4 mL        Critically Ill patient  : [ ] preoperative ,   [ ] post operative    Requires :  [x ] Arterial Line   [x ] Central Line  [ ] PA catheter  [ ] IABP  [ ] ECMO  [x ] LVAD  [ ] Ventilator  [ ] pacemaker [ ] Impella.                      [x ] ABG's     [ x] Pulse Oxymetry Monitoring  Bedside evaluation , monitoring , treatment of hemodynamics , fluids , IVP/ IVCD meds.        Diagnosis:     Admit for COVID re-infection, AMS, hypotension     Stage D Nonischemic Cardiomyopathy s/p HM2 on 2017    LVAD circuit     CHF- acute [ x]   chronic [ x]    systolic [ x]   diatolic [ ]          - Echo- EF -      10%   [x ] RV dysfunction          - Cxr-cardiomegally, edema          - Clinical-  [ ]inotropes   [ ]pressors   [ ]diuresis   [ ]IABP   [ ]ECMO   [ x]LVAD   [ ]Respiratory Failure.     Hemodynamic lability,  instability. Requires IVCD [ ] vasopressors [ ] inotropes  [ ] vasodilator  [ ]IVSS fluid  to maintain MAP, perfusion, C.I.     Hypotension     IVCD insulin     Hyponatremia               By signing my name below, I, Agnieszka Cherry, attest that this documentation has been prepared under the direction and in the presence of Luis Fernando Thompson MD.   Electronically Signed: Cesia Shaffer 12-15-21 @ 09:05      Discussed with CT surgeon, Physician Assistant - Nurse Practitioner- Critical care medicine team.   Discussed at  AM / PM rounds.   Chart, labs , films reviewed.    Cumulative Critical Care Time Given Today:  CRITICAL CARE ATTENDING - [facility]    MEDICATIONS  (STANDING):  aspirin  chewable 81 milliGRAM(s) Oral daily  cefepime   IVPB      cefepime   IVPB 1000 milliGRAM(s) IV Intermittent every 8 hours  dexAMETHasone  Injectable 6 milliGRAM(s) IV Push daily  dextrose 50% Injectable 50 milliLiter(s) IV Push every 15 minutes  dextrose 50% Injectable 25 milliLiter(s) IV Push every 15 minutes  enoxaparin Injectable 40 milliGRAM(s) SubCutaneous every 12 hours  insulin regular Infusion 3 Unit(s)/Hr (3 mL/Hr) IV Continuous <Continuous>  methimazole 10 milliGRAM(s) Oral daily  metoclopramide 5 milliGRAM(s) Oral three times a day  mirtazapine 7.5 milliGRAM(s) Oral at bedtime  multiple electrolytes Injection Type 1 1000 milliLiter(s) (45 mL/Hr) IV Continuous <Continuous>  pantoprazole  Injectable 40 milliGRAM(s) IV Push every 12 hours  remdesivir  IVPB   IV Intermittent   remdesivir  IVPB 100 milliGRAM(s) IV Intermittent every 24 hours  sertraline 100 milliGRAM(s) Oral daily  sodium chloride 0.9% lock flush 3 milliLiter(s) IV Push every 8 hours  vancomycin    Solution 125 milliGRAM(s) Oral every 12 hours  vancomycin  IVPB 1000 milliGRAM(s) IV Intermittent every 12 hours  vancomycin  IVPB      vasopressin Infusion 0.04 Unit(s)/Min (2.4 mL/Hr) IV Continuous <Continuous>                                    7.9    15.74 )-----------( 137      ( 15 Dec 2021 00:43 )             24.2       12-15    131<L>  |  96  |  16  ----------------------------<  288<H>  4.0   |  24  |  0.53    Ca    8.8      15 Dec 2021 00:43  Phos  2.0     12-15  Mg     2.2     12-15    TPro  6.6  /  Alb  2.7<L>  /  TBili  0.2  /  DBili  x   /  AST  16  /  ALT  15  /  AlkPhos  106  12-15      PT/INR - ( 15 Dec 2021 00:43 )   PT: 14.7 sec;   INR: 1.24 ratio         PTT - ( 15 Dec 2021 00:43 )  PTT:30.4 sec        Daily     Daily Weight in k.7 (15 Dec 2021 00:00)       @ 07:01  -  12-15 @ 07:00  --------------------------------------------------------  IN: 2746.6 mL / OUT: 960 mL / NET: 1786.6 mL    12-15 @ 07:01  -  12-15 @ 09:05  --------------------------------------------------------  IN: 90.6 mL / OUT: 120 mL / NET: -29.4 mL        Critically Ill patient  : [ ] preoperative ,   [ ] post operative    Requires :  [x ] Arterial Line   [x ] Central Line  [ ] PA catheter  [ ] IABP  [ ] ECMO  [x ] LVAD  [ ] Ventilator  [ ] pacemaker [ ] Impella.                      [x ] ABG's     [ x] Pulse Oxymetry Monitoring  Bedside evaluation , monitoring , treatment of hemodynamics , fluids , IVP/ IVCD meds.        Diagnosis:     Admit for COVID re-infection, AMS, hypotension     Stage D Nonischemic Cardiomyopathy s/p HM2 on 2017    LVAD circuit     CHF- acute [ x]   chronic [ x]    systolic [ x]   diatolic [ ]          - Echo- EF -      10%   [x ] RV dysfunction          - Cxr-cardiomegally, edema          - Clinical-  [ ]inotropes   [ ]pressors   [ ]diuresis   [ ]IABP   [ ]ECMO   [ x]LVAD   [ ]Respiratory Failure.     Hemodynamic lability,  instability. Requires IVCD [x ] vasopressors [ ] inotropes  [ ] vasodilator  [x ]IVSS fluid  to maintain MAP, perfusion, C.I.     Hypotension     IVCD insulin     Hyponatremia     sepsis    e Coli bacteremia - ? etiology ?              By signing my name below, I, Agnieszka Cherry, attest that this documentation has been prepared under the direction and in the presence of Luis Fernando Thompson MD.   Electronically Signed: Cesia Shaffer 12-15-21 @ 09:05    I, Luis Fernando Thompson, personally performed the services described in this documentation. All medical record entries made by the scribe were at my direction and in my presence. I have reviewed the chart and agree that the record reflects my personal performance and is accurate and complete.   Luis Fernando Thompson MD.       Discussed with CT surgeon, Physician Assistant - Nurse Practitioner- Critical care medicine team.   Discussed at  AM / PM rounds.   Chart, labs , films reviewed.    Cumulative Critical Care Time Given Today:  30 min

## 2021-12-15 NOTE — PROGRESS NOTE ADULT - ASSESSMENT
Mr. Quispe is a 65 y/o M with a history of Stage D NICM s/p HM2 LVAD in 9/2017 at  (due to severe PAD) with TV ring, multiple GI bleeds, thus maintained off AC, CKD 3prior COVID-19 infection in 4/2020, recurrent syncope post LVAD s/p ILR, s/p prolonged complex hospitalization from 6/14-11/16/2021 initially admitted with abdominal pain complicated by GIB, respiratory failure s/p trach, multiple infections, s/p cholecystotomy tube and SMA stent 10/2021, ultimately discharged to [facility] rehab and then returned to [facility] for trach decannulation 12/2 who now presents with recurrent COVID-19 infection in shock. He has elevated inflammatory markers with , fibrinogen 668, rising  from 251 in 12/6. Additionally of note his ddimer is elevated at 3600 as is his procalcitonin at 4.48. Blood cultures sent with NGTD. His inflammatory markers are improving. He has now received monoclonal antibodies and was started on remdesivir and dexamethasone. His oxygen requirements are stable, he is continuing to require 3L NC. His vasopressor requirement is decreasing and currently minimal. He is maintaining a MAP 70-80. CVP is 2-3 today. His renal function is normal. His leukocytosis is improving. His LVAD is functioning well without s/s of pump malfunction.

## 2021-12-15 NOTE — DIETITIAN INITIAL EVALUATION ADULT. - PERTINENT LABORATORY DATA
12-15 Na131 mmol/L<L> Glu 288 mg/dL<H> K+ 4.0 mmol/L Cr  0.53 mg/dL BUN 16 mg/dL Phos 2.0 mg/dL<L> Alb 2.7 g/dL<L> PAB n/a

## 2021-12-15 NOTE — DIETITIAN INITIAL EVALUATION ADULT. - REASON
Unable to perform Nutrition Focused Physical Assessment in current setting; however, visual findings observed as noted above. RD will reassess as appropriate.

## 2021-12-16 NOTE — PROGRESS NOTE ADULT - ASSESSMENT
64yo man with a history of VAD palcement, history of COVID infeciton in April 2020, history of a prolonged hospitalization in 2021 with recurrent sepsis, pneumonia, intubated/trach x2 cycles , chronic gall bladder disease s/p perc dawn, SMA ischemia reuiring a stent placement, cachecxia who was discharged to rehab but is presented from [facility] on 12/13 with AMS, hypotensive, tachycardic found to be COVID (+). Pt is lethargic and mumbling words required ICU admission and pressor support.    More awake, alert, interactive   oxygen nasal requirements with reasonable saturation    COVID infection-   confirmed second bout of COVID by documented PCR testing  Ideally, would be interested in sequencing the virus to determine strain (omicron vs delta)  Received a dose of monoclonal antibodies Regeneron product   Day 2 of Remdesivir/Dexamethasone  Refuses most vaccines- but will need to wait three months  to receive COVID vaccine series post monoclonal  Will administer flu/pneumonia vaccines this admission prior to discharge  Send LDH, CRP,ESR, Ferritin QOD to trend    Repeat CXR shows a small RLL infiltrate patient is receiving Cefepime and Vancomycin  check Vanco trough prior to next dose    Leukocytosis:  History of recurrent pneumonia and bacteremias   With stenotrophomonas in prior sputum cultures and enterobacter and serratia in blood in October 2021  Blood cultures sent 12/13 are now growing serratia- repeat blood cultures sent and pending   Cefepime to 2 gram IV q 8hr pending  organism sensitivity   repeat blood cultures for evidence of clearing are pending   RUQ sonogram to reassess- gall bladder   Hiccups- possibly related to diaphragm irritation      Prior severe C.diff infection   pre-emptive oral Vanco 125mg bid    discussed with CHF team    Morris Prater MD  [phone]  After 5pm/weekends [phone]

## 2021-12-16 NOTE — PROGRESS NOTE ADULT - ATTENDING COMMENTS
BCX3 sets from admission growing seratia, which patient has had in the past.   meds: rendosovir, decadron, cefipime, vanco po , vaso off. asa, s/q lovanox. methimazole.  LVAD: 9200, flow 5.8.   CVP 3, HR SR 60-90, MAPs 68-78, on room RA.   CXR: clear   TTE: 12.13: LA 3.4, LVEDD 5.5, LVEF 10, normal RVsize, RVD. annuloplast Tv. mild TR. mod MR. mild AI, partial Av opening each beat. RVSP 35.   12-16    134<L>  |  100  |  17  ----------------------------<  149<H>  3.9   |  26  |  0.75    Ca    8.9      16 Dec 2021 00:45  Phos  1.8     12-16  Mg     2.1     12-16  TPro  6.7  /  Alb  2.7<L>  /  TBili  0.3  /  DBili  x   /  AST  17  /  ALT  15  /  AlkPhos  114  12-16                        7.8    12.12 )-----------( 135      ( 16 Dec 2021 00:44 )             24.7   WBC 12, 15.74,   CRP 73,     recurrent covid infection without evidence for covid lung injury  recurrent seratia bacteremia. concern for LVAD seeding and possible endocarditis (patient has tricuspid ring). Will discuss need for COREY at some later stage.   continue supportive care.   PT  Zi Werner

## 2021-12-16 NOTE — PROGRESS NOTE ADULT - SUBJECTIVE AND OBJECTIVE BOX
INFECTIOUS DISEASES FOLLOW UP-- Anitra Prater  [phone]    This is a follow up note for this  66yMale with  Sepsis  COVID and serratia bacteremia    sitting in achair but no appetite and notes hiccups today        ROS:  CONSTITUTIONAL:  No fever,   CARDIOVASCULAR:  No chest pain or palpitations  RESPIRATORY:  No dyspnea  GASTROINTESTINAL:  Notes nausea,no vomiting, diarrhea, notes abdominal pain  GENITOURINARY:  No dysuria  NEUROLOGIC:  No headache,     Allergies    Amiodarone Hydrochloride (Other (Severe))    Intolerances        ANTIBIOTICS/RELEVANT:  antimicrobials  cefepime   IVPB 2000 milliGRAM(s) IV Intermittent every 8 hours  remdesivir  IVPB   IV Intermittent   remdesivir  IVPB 100 milliGRAM(s) IV Intermittent every 24 hours  vancomycin    Solution 125 milliGRAM(s) Oral every 12 hours    immunologic:    OTHER:  aspirin  chewable 81 milliGRAM(s) Oral daily  chlorhexidine 2% Cloths 1 Application(s) Topical <User Schedule>  dexAMETHasone  Injectable 6 milliGRAM(s) IV Push daily  dextrose 50% Injectable 50 milliLiter(s) IV Push every 15 minutes  dextrose 50% Injectable 25 milliLiter(s) IV Push every 15 minutes  enoxaparin Injectable 40 milliGRAM(s) SubCutaneous every 12 hours  insulin lispro (ADMELOG) corrective regimen sliding scale   SubCutaneous three times a day before meals  insulin lispro (ADMELOG) corrective regimen sliding scale   SubCutaneous at bedtime  methimazole 10 milliGRAM(s) Oral daily  metoclopramide Injectable 10 milliGRAM(s) IV Push every 8 hours  mirtazapine 7.5 milliGRAM(s) Oral at bedtime  pantoprazole  Injectable 40 milliGRAM(s) IV Push every 12 hours  potassium phosphate / sodium phosphate Tablet (K-PHOS No. 2) 1 Tablet(s) Oral two times a day  sertraline 100 milliGRAM(s) Oral daily  sodium chloride 0.9% lock flush 3 milliLiter(s) IV Push every 8 hours  vasopressin Infusion 0.04 Unit(s)/Min IV Continuous <Continuous>      Objective:  Vital Signs Last 24 Hrs  T(C): 36 (16 Dec 2021 15:00), Max: 36.7 (16 Dec 2021 08:00)  T(F): 96.8 (16 Dec 2021 15:00), Max: 98.1 (16 Dec 2021 08:00)  HR: 93 (16 Dec 2021 18:00) (57 - 109)  BP: --  BP(mean): --  RR: 16 (16 Dec 2021 18:00) (15 - 37)  SpO2: 100% (16 Dec 2021 18:00) (93% - 100%)    PHYSICAL EXAM:  Constitutional:no acute distress  Eyes:ALONSO, EOMI  Ear/Nose/Throat: no oral lesions, 	  Respiratory: clear BL  Cardiovascular: S1S2  Gastrointestinal: some distension, drain RUQ bilious, some RUQ tenderness  Extremities:no e/e/c  No Lymphadenopathy  IV sites not inflammed.    LABS:                        7.8    12.12 )-----------( 135      ( 16 Dec 2021 00:44 )             24.7     12-16    134<L>  |  100  |  17  ----------------------------<  149<H>  3.9   |  26  |  0.75    Ca    8.9      16 Dec 2021 00:45  Phos  1.8     12-16  Mg     2.1     12-16    TPro  6.7  /  Alb  2.7<L>  /  TBili  0.3  /  DBili  x   /  AST  17  /  ALT  15  /  AlkPhos  114  12-16    PT/INR - ( 16 Dec 2021 00:44 )   PT: 12.7 sec;   INR: 1.06 ratio         PTT - ( 16 Dec 2021 00:44 )  PTT:30.6 sec      MICROBIOLOGY:            RECENT CULTURES:  12-13 @ 05:50  .Blood Blood-Peripheral  Blood Culture PCR  Blood Culture PCR  PCR    Growth in aerobic bottle: Serratia marcescens  See previous culture [id number]  --      RADIOLOGY & ADDITIONAL STUDIES:    < from: Xray Chest 1 View- PORTABLE-Routine (Xray Chest 1 View- PORTABLE-Routine in AM.) (12.16.21 @ 04:23) >    IMPRESSION:  Clear lungs.    < end of copied text >

## 2021-12-16 NOTE — PROGRESS NOTE ADULT - SUBJECTIVE AND OBJECTIVE BOX
CRITICAL CARE ATTENDING - CTICU    MEDICATIONS  (STANDING):  aspirin  chewable 81 milliGRAM(s) Oral daily  cefepime   IVPB 2000 milliGRAM(s) IV Intermittent every 8 hours  chlorhexidine 2% Cloths 1 Application(s) Topical <User Schedule>  dexAMETHasone  Injectable 6 milliGRAM(s) IV Push daily  dextrose 50% Injectable 50 milliLiter(s) IV Push every 15 minutes  dextrose 50% Injectable 25 milliLiter(s) IV Push every 15 minutes  enoxaparin Injectable 40 milliGRAM(s) SubCutaneous every 12 hours  insulin lispro (ADMELOG) corrective regimen sliding scale   SubCutaneous three times a day before meals  insulin lispro (ADMELOG) corrective regimen sliding scale   SubCutaneous at bedtime  methimazole 10 milliGRAM(s) Oral daily  metoclopramide 5 milliGRAM(s) Oral three times a day  mirtazapine 7.5 milliGRAM(s) Oral at bedtime  pantoprazole  Injectable 40 milliGRAM(s) IV Push every 12 hours  remdesivir  IVPB   IV Intermittent   remdesivir  IVPB 100 milliGRAM(s) IV Intermittent every 24 hours  sertraline 100 milliGRAM(s) Oral daily  sodium chloride 0.9% lock flush 3 milliLiter(s) IV Push every 8 hours  vancomycin    Solution 125 milliGRAM(s) Oral every 12 hours  vasopressin Infusion 0.04 Unit(s)/Min (2.4 mL/Hr) IV Continuous <Continuous>                                    7.8    12.12 )-----------( 135      ( 16 Dec 2021 00:44 )             24.7         12-16    134<L>  |  100  |  17  ----------------------------<  149<H>  3.9   |  26  |  0.75    Ca    8.9      16 Dec 2021 00:45  Phos  1.8     12-16  Mg     2.1     12-16    TPro  6.7  /  Alb  2.7<L>  /  TBili  0.3  /  DBili  x   /  AST  17  /  ALT  15  /  AlkPhos  114  12-16      PT/INR - ( 16 Dec 2021 00:44 )   PT: 12.7 sec;   INR: 1.06 ratio         PTT - ( 16 Dec 2021 00:44 )  PTT:30.6 sec        Daily     Daily       12-15 @ 07:01  -  12-16 @ 07:00  --------------------------------------------------------  IN: 1127.9 mL / OUT: 1500 mL / NET: -372.1 mL    12-16 @ 07:01  - 12-16 @ 11:27  --------------------------------------------------------  IN: 295 mL / OUT: 500 mL / NET: -205 mL        Critically Ill patient  : [ ] preoperative ,   [x] post operative    Requires :  [ x] Arterial Line   [x] Central Line  [ ] PA catheter  [ ] IABP  [ ] ECMO  [x] LVAD  [ ] Ventilator  [ ] pacemaker [ ] Impella.                      [x ] ABG's     [ x] Pulse Oxymetry Monitoring  Bedside evaluation , monitoring , treatment of hemodynamics , fluids , IVP/ IVCD meds.        Diagnosis:     Admit for COVID re-infection, AMS, hypotension on 12/10    Stage D Nonischemic Cardiomyopathy s/p HM2 on 9/2017    LVAD circuit     CHF- acute [ x]   chronic [ x]    systolic [ x]   diatolic [ ]          - Echo- EF -      10%   [x ] RV dysfunction          - Cxr-cardiomegally, edema          - Clinical-  [ ]inotropes   [x ]pressors   [ ]diuresis   [ ]IABP   [ ]ECMO   [ x]LVAD   [ ]Respiratory Failure.     Hemodynamic lability,  instability. Requires IVCD [x ] vasopressors [ ] inotropes  [ ] vasodilator  [x ]IVSS fluid  to maintain MAP, perfusion, C.I.     Hypotension     sepsis    e Coli bacteremia - ? etiology ? BCx with gram negative rods     Thrombocytopenia    Hyponatremia               By signing my name below, I, Agnieszka Cherry, attest that this documentation has been prepared under the direction and in the presence of Luis Fernando Thompson MD.   Electronically Signed: Cesia Shaffer 12-16-21 @ 11:27      Discussed with CT surgeon, Physician Assistant - Nurse Practitioner- Critical care medicine team.   Discussed at  AM / PM rounds.   Chart, labs , films reviewed.    Cumulative Critical Care Time Given Today:  Dr Trudy Bang notified of elevated POC lactic acid; Code Sepsis activated CRITICAL CARE ATTENDING - CTICU    MEDICATIONS  (STANDING):  aspirin  chewable 81 milliGRAM(s) Oral daily  cefepime   IVPB 2000 milliGRAM(s) IV Intermittent every 8 hours  chlorhexidine 2% Cloths 1 Application(s) Topical <User Schedule>  dexAMETHasone  Injectable 6 milliGRAM(s) IV Push daily  dextrose 50% Injectable 50 milliLiter(s) IV Push every 15 minutes  dextrose 50% Injectable 25 milliLiter(s) IV Push every 15 minutes  enoxaparin Injectable 40 milliGRAM(s) SubCutaneous every 12 hours  insulin lispro (ADMELOG) corrective regimen sliding scale   SubCutaneous three times a day before meals  insulin lispro (ADMELOG) corrective regimen sliding scale   SubCutaneous at bedtime  methimazole 10 milliGRAM(s) Oral daily  metoclopramide 5 milliGRAM(s) Oral three times a day  mirtazapine 7.5 milliGRAM(s) Oral at bedtime  pantoprazole  Injectable 40 milliGRAM(s) IV Push every 12 hours  remdesivir  IVPB   IV Intermittent   remdesivir  IVPB 100 milliGRAM(s) IV Intermittent every 24 hours  sertraline 100 milliGRAM(s) Oral daily  sodium chloride 0.9% lock flush 3 milliLiter(s) IV Push every 8 hours  vancomycin    Solution 125 milliGRAM(s) Oral every 12 hours  vasopressin Infusion 0.04 Unit(s)/Min (2.4 mL/Hr) IV Continuous <Continuous>                                    7.8    12.12 )-----------( 135      ( 16 Dec 2021 00:44 )             24.7         12-16    134<L>  |  100  |  17  ----------------------------<  149<H>  3.9   |  26  |  0.75    Ca    8.9      16 Dec 2021 00:45  Phos  1.8     12-16  Mg     2.1     12-16    TPro  6.7  /  Alb  2.7<L>  /  TBili  0.3  /  DBili  x   /  AST  17  /  ALT  15  /  AlkPhos  114  12-16      PT/INR - ( 16 Dec 2021 00:44 )   PT: 12.7 sec;   INR: 1.06 ratio         PTT - ( 16 Dec 2021 00:44 )  PTT:30.6 sec        Daily     Daily       12-15 @ 07:01  -  12-16 @ 07:00  --------------------------------------------------------  IN: 1127.9 mL / OUT: 1500 mL / NET: -372.1 mL    12-16 @ 07:01  - 12-16 @ 11:27  --------------------------------------------------------  IN: 295 mL / OUT: 500 mL / NET: -205 mL        Critically Ill patient  : [ ] preoperative ,   [x] post operative    Requires :  [ x] Arterial Line   [x] Central Line  [ ] PA catheter  [ ] IABP  [ ] ECMO  [x] LVAD  [ ] Ventilator  [ ] pacemaker [ ] Impella.                      [x ] ABG's     [ x] Pulse Oxymetry Monitoring  Bedside evaluation , monitoring , treatment of hemodynamics , fluids , IVP/ IVCD meds.        Diagnosis:     Admit for COVID re-infection, AMS, hypotension on 12/10    Stage D Nonischemic Cardiomyopathy s/p HM2 on 9/2017    LVAD circuit     CHF- acute [ x]   chronic [ x]    systolic [ x]   diatolic [ ]          - Echo- EF -      10%   [x ] RV dysfunction          - Cxr-cardiomegally, edema          - Clinical-  [ ]inotropes   [x ]pressors - on/off   [ ]diuresis   [ ]IABP   [ ]ECMO   [ x]LVAD   [ ]Respiratory Failure.     Hemodynamic lability,  instability. Requires IVCD [x ] vasopressors - on/off  [ ] inotropes  [ ] vasodilator  [x ]IVSS fluid  to maintain MAP, perfusion, C.I.     Hypotension     sepsis    Serratia bacteremia - S/P  acalculous cholycystitis and drainage     Thrombocytopenia    Hyponatremia     Requires bedside physical therapy, mobilization and total half-way care.                   By signing my name below, I, Agnieszka Cherry, attest that this documentation has been prepared under the direction and in the presence of Luis Fernando Thompson MD.   Electronically Signed: Cesia Shaffer 12-16-21 @ 11:27    I, Luis Fernando Thompson, personally performed the services described in this documentation. All medical record entries made by the scribe were at my direction and in my presence. I have reviewed the chart and agree that the record reflects my personal performance and is accurate and complete.   Luis Fernando Thompson MD.       Discussed with CT surgeon, Physician Assistant - Nurse Practitioner- Critical care medicine team.   Discussed at  AM / PM rounds.   Chart, labs , films reviewed.    Cumulative Critical Care Time Given Today:  30 min

## 2021-12-16 NOTE — PROGRESS NOTE ADULT - PROBLEM SELECTOR PLAN 4
- Continue to monitor CVP and central venous saturation  - Encourage PO fluid intake given low CVP, goal 6-8  - Daily PT

## 2021-12-16 NOTE — PROGRESS NOTE ADULT - PROBLEM SELECTOR PLAN 2
- Dr. 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

## 2021-12-16 NOTE — PROGRESS NOTE ADULT - SUBJECTIVE AND OBJECTIVE BOX
Subjective:      Medications:  aspirin  chewable 81 milliGRAM(s) Oral daily  cefepime   IVPB 2000 milliGRAM(s) IV Intermittent every 8 hours  chlorhexidine 2% Cloths 1 Application(s) Topical <User Schedule>  dexAMETHasone  Injectable 6 milliGRAM(s) IV Push daily  dextrose 50% Injectable 50 milliLiter(s) IV Push every 15 minutes  dextrose 50% Injectable 25 milliLiter(s) IV Push every 15 minutes  enoxaparin Injectable 40 milliGRAM(s) SubCutaneous every 12 hours  insulin lispro (ADMELOG) corrective regimen sliding scale   SubCutaneous three times a day before meals  insulin lispro (ADMELOG) corrective regimen sliding scale   SubCutaneous at bedtime  methimazole 10 milliGRAM(s) Oral daily  metoclopramide 5 milliGRAM(s) Oral three times a day  mirtazapine 7.5 milliGRAM(s) Oral at bedtime  pantoprazole  Injectable 40 milliGRAM(s) IV Push every 12 hours  remdesivir  IVPB   IV Intermittent   remdesivir  IVPB 100 milliGRAM(s) IV Intermittent every 24 hours  sertraline 100 milliGRAM(s) Oral daily  sodium chloride 0.9% lock flush 3 milliLiter(s) IV Push every 8 hours  vancomycin    Solution 125 milliGRAM(s) Oral every 12 hours  vasopressin Infusion 0.04 Unit(s)/Min IV Continuous <Continuous>      ICU Vital Signs Last 24 Hrs  T(C): 36.4, Max: 36.7 (12-16 @ 08:00)  HR: 92 (57 - 98)  BP: --  BP(mean): --  ABP: 93/65 (73/57 - 117/72)  ABP(mean): 76 (62 - 88)  RR: 23 (15 - 37)  SpO2: 97% (93% - 100%)    Weight in k.7 (12-15-21)  Weight in k.2 (12-14-21)      I&O's Summary Last 24 Hrs    IN: 1127.9 mL / OUT: 1500 mL / NET: -372.1 mL      Tele:    Physical Exam:    General: No distress. Comfortable.  HEENT: EOM intact.  Neck: JVP not elevated.  Respiratory: Clear to auscultation bilaterally  CV: RRR. Normal S1 and S2. No murmurs, rub, or gallops. Radial pulses normal.  Abdomen: Soft, non-distended, non-tender  Skin: No skin lesions  Extremities: Warm, no edema  Neurology: Non-focal, alert and oriented times three.   Psych: Affect normal    Labs:    ( 21 @ 00:44 )               7.8    12.12 )--------( 135                  24.7     ( 21 @ 00:45 )     134  |  100  |  17  ---------------------<  149  3.9  |  26  |  0.75    Ca 8.9  Phos 1.8  Mg 2.1    ( 21 @ 00:45 )  TPro  6.7  /  Alb  2.7  /  TBili  0.3  /  DBili  x   /  AST  17  /  ALT  15  /  AlkPhos  114  ( 12-15-21 @ 00:43 )  TPro  6.6  /  Alb  2.7  /  TBili  0.2  /  DBili  x   /  AST  16  /  ALT  15  /  AlkPhos  106    PTT/PT/INR - ( 21 @ 00:44 )  PTT: 30.6 sec / PT: 12.7 sec / INR: 1.06 ratio            ABG - ( 21 @ 00:41 )  pH: 7.36  / pCO2: 54    / pO2: 178   / HCO3: 30    / Base Excess: 4.4   / SaO2: 100.0      Lactate Dehydrogenase, Serum: 213 (21 @ 00:45)  Lactate Dehydrogenase, Serum: 184 (12-15-21 @ 00:43)   Subjective:   - weaned off vaso this AM  - OOB to chair with no complaints    Medications:  aspirin  chewable 81 milliGRAM(s) Oral daily  cefepime   IVPB 2000 milliGRAM(s) IV Intermittent every 8 hours  chlorhexidine 2% Cloths 1 Application(s) Topical <User Schedule>  dexAMETHasone  Injectable 6 milliGRAM(s) IV Push daily  dextrose 50% Injectable 50 milliLiter(s) IV Push every 15 minutes  dextrose 50% Injectable 25 milliLiter(s) IV Push every 15 minutes  enoxaparin Injectable 40 milliGRAM(s) SubCutaneous every 12 hours  insulin lispro (ADMELOG) corrective regimen sliding scale   SubCutaneous three times a day before meals  insulin lispro (ADMELOG) corrective regimen sliding scale   SubCutaneous at bedtime  methimazole 10 milliGRAM(s) Oral daily  metoclopramide 5 milliGRAM(s) Oral three times a day  mirtazapine 7.5 milliGRAM(s) Oral at bedtime  pantoprazole  Injectable 40 milliGRAM(s) IV Push every 12 hours  remdesivir  IVPB   IV Intermittent   remdesivir  IVPB 100 milliGRAM(s) IV Intermittent every 24 hours  sertraline 100 milliGRAM(s) Oral daily  sodium chloride 0.9% lock flush 3 milliLiter(s) IV Push every 8 hours  vancomycin    Solution 125 milliGRAM(s) Oral every 12 hours  vasopressin Infusion 0.04 Unit(s)/Min IV Continuous <Continuous>      ICU Vital Signs Last 24 Hrs  T(C): 36.4, Max: 36.7 (12-16 @ 08:00)  HR: 92 (57 - 98)  BP: --  BP(mean): --  ABP: 93/65 (73/57 - 117/72)  ABP(mean): 76 (62 - 88)  RR: 23 (15 - 37)  SpO2: 97% (93% - 100%)    Weight in k.7 (12-15-21)  Weight in k.2 (12-14-21)      I&O's Summary Last 24 Hrs    IN: 1127.9 mL / OUT: 1500 mL / NET: -372.1 mL      Tele: SR     Physical Exam:    General: Frail. No distress. Comfortable.  HEENT: EOM intact.  Neck: Neck supple. JVP not elevated. No masses  Chest: Clear to auscultation bilaterally  CV: LVAD Hum. palpable radial pulses. No LE edema.  Abdomen: Soft, non-distended, non-tender  Skin: No rashes or skin breakdown. DLES dressing and dawn drain with dressing C/D/I  Neurology: Alert and oriented times three. Sensation intact  Psych: Affect normal    LVAD Interrogation:  HM2  Speed 9200  Flow 5.9  Power 6.4  PI 5.5    Labs:    ( 21 @ 00:44 )               7.8    12.12 )--------( 135                  24.7     ( 21 @ 00:45 )     134  |  100  |  17  ---------------------<  149  3.9  |  26  |  0.75    Ca 8.9  Phos 1.8  Mg 2.1    ( 21 @ 00:45 )  TPro  6.7  /  Alb  2.7  /  TBili  0.3  /  DBili  x   /  AST  17  /  ALT  15  /  AlkPhos  114  ( 12-15-21 @ 00:43 )  TPro  6.6  /  Alb  2.7  /  TBili  0.2  /  DBili  x   /  AST  16  /  ALT  15  /  AlkPhos  106    PTT/PT/INR - ( 21 @ 00:44 )  PTT: 30.6 sec / PT: 12.7 sec / INR: 1.06 ratio    ABG - ( 21 @ 00:41 )  pH: 7.36  / pCO2: 54    / pO2: 178   / HCO3: 30    / Base Excess: 4.4   / SaO2: 100.0    Lactate Dehydrogenase, Serum: 213 (21 @ 00:45)  Lactate Dehydrogenase, Serum: 184 (12-15-21 @ 00:43)

## 2021-12-16 NOTE — PROGRESS NOTE ADULT - ASSESSMENT
65 y/o M with a history of Stage D NICM s/p HM2 LVAD in 9/2017 at  (due to severe PAD) with TV ring, multiple GI bleeds, thus maintained off AC, CKD 3prior COVID-19 infection in 4/2020, recurrent syncope post LVAD s/p ILR, s/p prolonged complex hospitalization from 6/14-11/16/2021 initially admitted with abdominal pain complicated by GIB, respiratory failure s/p trach, multiple infections, s/p cholecystotomy tube and SMA stent 10/2021, ultimately discharged to [facility] rehab and then returned to [facility] for trach decannulation 12/2 who now presents with recurrent COVID-19 infection in shock. 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     He has elevated inflammatory markers which are now improving. He received monoclonal antibodies and was started on remdesivir and dexamethasone. He is saturating well on RA and was weaned off pressors this morning with MAP mostly in the 70s (CVP 3 today). His renal function is normal. His leukocytosis is improving. His LVAD is functioning well without s/s of pump malfunction.

## 2021-12-17 NOTE — PROGRESS NOTE ADULT - ASSESSMENT
64yo man with a history of VAD palcement, history of COVID infeciton in April 2020, history of a prolonged hospitalization in 2021 with recurrent sepsis, pneumonia, intubated/trach x2 cycles , chronic gall bladder disease s/p perc dawn, SMA ischemia reuiring a stent placement, cachecxia who was discharged to rehab but is presented from [facility] on 12/13 with AMS, hypotensive, tachycardic found to be COVID (+). Pt is lethargic and mumbling words required ICU admission and pressor support.    More awake, alert, interactive   oxygen nasal requirements with reasonable saturation    COVID infection-   confirmed second bout of COVID by documented PCR testing  Ideally, would be interested in sequencing the virus to determine strain (omicron vs delta)  Received a dose of monoclonal antibodies Regener[employer] product   Day 5 of Remdesivir/Dexamethasone with Remdesivir to complete today  Refuses most vaccines- but will need to wait three months  to receive COVID vaccine series post monoclonal  Will administer flu/pneumonia vaccines this admission prior to discharge if he will permit        Serratia Bacteremia:  History of recurrent pneumonia and bacteremias   With stenotrophomonas in prior sputum cultures and enterobacter and serratia in blood in October 2021  Blood cultures sent 12/13 growing serratia-    Cefepime to 2 gram IV q 8hr as strain is sensitive to Cefepime   repeat blood cultures for evidence of clearing are pending   RUQ sonogram done to reassess- gall bladder does not show acute cholecystitits  Hiccups- possibly related to diaphragm irritation    Source of serratia not entirely clear- differential GI translocation, biliary tree, lungs, other including LVAD      Prior severe C.diff infection   pre-emptive oral Vanco 125mg bid    discussed with CHF team    Morris Prater MD  [phone]  After 5pm/weekends [phone]

## 2021-12-17 NOTE — PROGRESS NOTE ADULT - SUBJECTIVE AND OBJECTIVE BOX
CRITICAL CARE ATTENDING - CTICU    MEDICATIONS  (STANDING):  aspirin  chewable 81 milliGRAM(s) Oral daily  baclofen 5 milliGRAM(s) Oral every 8 hours  cefepime   IVPB 2000 milliGRAM(s) IV Intermittent every 8 hours  chlorhexidine 2% Cloths 1 Application(s) Topical <User Schedule>  dexAMETHasone  Injectable 6 milliGRAM(s) IV Push daily  dextrose 50% Injectable 50 milliLiter(s) IV Push every 15 minutes  dextrose 50% Injectable 25 milliLiter(s) IV Push every 15 minutes  enoxaparin Injectable 40 milliGRAM(s) SubCutaneous every 12 hours  insulin lispro (ADMELOG) corrective regimen sliding scale   SubCutaneous three times a day before meals  insulin lispro (ADMELOG) corrective regimen sliding scale   SubCutaneous at bedtime  methimazole 10 milliGRAM(s) Oral daily  metoclopramide Injectable 10 milliGRAM(s) IV Push every 8 hours  mirtazapine 7.5 milliGRAM(s) Oral at bedtime  pantoprazole  Injectable 40 milliGRAM(s) IV Push every 12 hours  potassium phosphate / sodium phosphate Tablet (K-PHOS No. 2) 1 Tablet(s) Oral two times a day  remdesivir  IVPB   IV Intermittent   remdesivir  IVPB 100 milliGRAM(s) IV Intermittent every 24 hours  sertraline 100 milliGRAM(s) Oral daily  sodium chloride 0.9% lock flush 3 milliLiter(s) IV Push every 8 hours  vancomycin    Solution 125 milliGRAM(s) Oral every 12 hours                                    9.6    11.46 )-----------( 163      ( 17 Dec 2021 01:24 )             29.9       12-17    138  |  102  |  17  ----------------------------<  178<H>  3.7   |  27  |  0.54    Ca    8.5      17 Dec 2021 01:24  Phos  1.3     12-17  Mg     1.8     12-17    TPro  7.0  /  Alb  2.7<L>  /  TBili  0.3  /  DBili  x   /  AST  26  /  ALT  20  /  AlkPhos  143<H>  12-17      PT/INR - ( 17 Dec 2021 01:24 )   PT: 13.1 sec;   INR: 1.10 ratio         PTT - ( 17 Dec 2021 01:24 )  PTT:30.6 sec        Daily     Daily       12-16 @ 07:01  -  12-17 @ 07:00  --------------------------------------------------------  IN: 745 mL / OUT: 2550 mL / NET: -1805 mL    12-17 @ 07:01  -  12-17 @ 12:46  --------------------------------------------------------  IN: 640 mL / OUT: 425 mL / NET: 215 mL        Critically Ill patient  : [ ] preoperative ,   [x] post operative    Requires :  [x] Arterial Line   [x] Central Line  [ ] PA catheter  [ ] IABP  [ ] ECMO  [x] LVAD  [ ] Ventilator  [ ] pacemaker [ ] Impella.                      [x] ABG's     [x] Pulse Oxymetry Monitoring  Bedside evaluation , monitoring , treatment of hemodynamics , fluids , IVP/ IVCD meds.        Diagnosis:     Admit for COVID re-infection, AMS, hypotension on 12/10    Stage D Nonischemic Cardiomyopathy s/p HM2 on 9/2017    LVAD circuit     CHF- acute [ x]   chronic [ x]    systolic [ x]   diatolic [ ]          - Echo- EF -      10%   [x ] RV dysfunction          - Cxr-cardiomegally, edema          - Clinical-  [ ]inotropes   [x ]pressors - on/off   [ ]diuresis   [ ]IABP   [ ]ECMO   [ x]LVAD   [ ]Respiratory Failure.     Hemodynamic lability,  instability. Requires IVCD [x ] vasopressors - on/off  [ ] inotropes  [ ] vasodilator  [x ]IVSS fluid  to maintain MAP, perfusion, C.I.     Hypotension     sepsis    Serratia bacteremia - S/P  acalculous cholecystitis and drainage     Thrombocytopenia    Hyponatremia     Hiccups - ?diaphragmatic irritation    Requires bedside physical therapy, mobilization and total alf care.           I, Luis Fernando Thompson, personally performed the services described in this documentation. All medical record entries made by the scribe were at my direction and in my presence. I have reviewed the chart and agree that the record reflects my personal performance and is accurate and complete.   Luis Fernando Thompson MD.       By signing my name below, I, Emily Roa, attest that this documentation has been prepared under the direction and in the presence of Luis Fernando Thompson MD.   Electronically Signed: Emily Roa Scribe 12-17-21 @ 12:46        Discussed with CT surgeon, Physician Assistant - Nurse Practitioner- Critical care medicine team.   Dicussed at  AM / PM rounds.   Chart, labs , films reviewed.    Cumulative Critical Care Time Given Today:  CRITICAL CARE ATTENDING - CTICU    MEDICATIONS  (STANDING):  aspirin  chewable 81 milliGRAM(s) Oral daily  baclofen 5 milliGRAM(s) Oral every 8 hours  cefepime   IVPB 2000 milliGRAM(s) IV Intermittent every 8 hours  chlorhexidine 2% Cloths 1 Application(s) Topical <User Schedule>  dexAMETHasone  Injectable 6 milliGRAM(s) IV Push daily  dextrose 50% Injectable 50 milliLiter(s) IV Push every 15 minutes  dextrose 50% Injectable 25 milliLiter(s) IV Push every 15 minutes  enoxaparin Injectable 40 milliGRAM(s) SubCutaneous every 12 hours  insulin lispro (ADMELOG) corrective regimen sliding scale   SubCutaneous three times a day before meals  insulin lispro (ADMELOG) corrective regimen sliding scale   SubCutaneous at bedtime  methimazole 10 milliGRAM(s) Oral daily  metoclopramide Injectable 10 milliGRAM(s) IV Push every 8 hours  mirtazapine 7.5 milliGRAM(s) Oral at bedtime  pantoprazole  Injectable 40 milliGRAM(s) IV Push every 12 hours  potassium phosphate / sodium phosphate Tablet (K-PHOS No. 2) 1 Tablet(s) Oral two times a day  remdesivir  IVPB   IV Intermittent   remdesivir  IVPB 100 milliGRAM(s) IV Intermittent every 24 hours  sertraline 100 milliGRAM(s) Oral daily  sodium chloride 0.9% lock flush 3 milliLiter(s) IV Push every 8 hours  vancomycin    Solution 125 milliGRAM(s) Oral every 12 hours                                    9.6    11.46 )-----------( 163      ( 17 Dec 2021 01:24 )             29.9       12-17    138  |  102  |  17  ----------------------------<  178<H>  3.7   |  27  |  0.54    Ca    8.5      17 Dec 2021 01:24  Phos  1.3     12-17  Mg     1.8     12-17    TPro  7.0  /  Alb  2.7<L>  /  TBili  0.3  /  DBili  x   /  AST  26  /  ALT  20  /  AlkPhos  143<H>  12-17      PT/INR - ( 17 Dec 2021 01:24 )   PT: 13.1 sec;   INR: 1.10 ratio         PTT - ( 17 Dec 2021 01:24 )  PTT:30.6 sec        Daily     Daily       12-16 @ 07:01  -  12-17 @ 07:00  --------------------------------------------------------  IN: 745 mL / OUT: 2550 mL / NET: -1805 mL    12-17 @ 07:01  -  12-17 @ 12:46  --------------------------------------------------------  IN: 640 mL / OUT: 425 mL / NET: 215 mL        Critically Ill patient  : [ ] preoperative ,   [x] post operative    Requires :  [x] Arterial Line   [x] Central Line  [ ] PA catheter  [ ] IABP  [ ] ECMO  [x] LVAD  [ ] Ventilator  [ ] pacemaker [ ] Impella.                      [x] ABG's     [x] Pulse Oxymetry Monitoring  Bedside evaluation , monitoring , treatment of hemodynamics , fluids , IVP/ IVCD meds.        Diagnosis:     Admit for COVID re-infection, AMS, hypotension on 12/10    Stage D Nonischemic Cardiomyopathy s/p HM2 on 9/2017    LVAD circuit     CHF- acute [ x]   chronic [ x]    systolic [ x]   diatolic [ ]          - Echo- EF -      10%   [x ] RV dysfunction          - Cxr-cardiomegally, edema          - Clinical-  [ ]inotropes   [x ]pressors - on/off   [ ]diuresis   [ ]IABP   [ ]ECMO   [ x]LVAD   [ ]Respiratory Failure.     Hemodynamic lability,  instability. Requires IVCD [x ] vasopressors - on/off  [ ] inotropes  [ ] vasodilator  [x ]IVSS fluid  to maintain MAP, perfusion, C.I.     Hypotension     sepsis    Serratia bacteremia - S/P  acalculous cholecystitis and drainage     Thrombocytopenia    Hyponatremia         Requires bedside physical therapy, mobilization and total [facility] care.           I, Luis Fernando Thompson, personally performed the services described in this documentation. All medical record entries made by the scribe were at my direction and in my presence. I have reviewed the chart and agree that the record reflects my personal performance and is accurate and complete.   Luis Fernando Thompson MD.       By signing my name below, I, Emily Roa, attest that this documentation has been prepared under the direction and in the presence of Luis Fernando Thompson MD.   Electronically Signed: Emily Roa Scribe 12-17-21 @ 12:46        Discussed with CT surgeon, Physician Assistant - Nurse Practitioner- Critical care medicine team.   Dicussed at  AM / PM rounds.   Chart, labs , films reviewed.    Cumulative Critical Care Time Given Today: 20 min

## 2021-12-17 NOTE — PROGRESS NOTE ADULT - ATTENDING COMMENTS
no events overnight. off O2. afebrile.   meds: cefipime, rendosivir day 4, decadron 6mg, PO vanco, baclofen, remeron, asa.   LVAD: 9200, flow 5.5, PI 6.7,   HR , MAPs 70s, CVP 2  I/O: -1800  abdo US unremarkable.   12-17    138  |  102  |  17  ----------------------------<  178<H>  3.7   |  27  |  0.54    Ca    8.5      17 Dec 2021 01:24  Phos  1.3     12-17  Mg     1.8     12-17  TPro  7.0  /  Alb  2.7<L>  /  TBili  0.3  /  DBili  x   /  AST  26  /  ALT  20  /  AlkPhos  143<H>  12-17                        9.6    11.46 )-----------( 163      ( 17 Dec 2021 01:24 )             29.9   discussed with Dr Prater. continue conservative management for bacteremia. last day rendisovir, complete 10 days decadron.   restart HDZN 20 tid. Ald 25.   remove central line.   Daily PT  encourage oral intake and nutrition.   Zi Werner

## 2021-12-17 NOTE — PROGRESS NOTE ADULT - PROBLEM SELECTOR PLAN 2
As per my initial consult note dated 12/13: Though in a simple way, the patient was able to give verbalized understanding about his conditions (HF s/p LVAD, prior prolonged hospitalization due to issues with feeding and s/p a surgical procedure, and now with COVID-19 and hypotension). We then discussed about code status and he indicated he wanted to be full code. When it comes to intubation, he recognized that in the setting of respiratory failure, if he were to opt for not being intubated, that he will likely die. However, his desire was to prolong his life. Hence, intubation, was aligned with his goals. I tried to d/w him about what to do if he were to become dependent on a ventilator, however, he was not able to give an answer. When it comes to a cardiac arrest, we discussed about the lack of chances for him to be able to survive a scenario were his heart, due to pump failure or lack of blood flow, were to stop. I also indicated that, in such scenario, CPR was unlikely to be helpful. The patient indicated he wanted to be full code, even if he were not to be able to survive CPR, since, surviving, even in a compromised state, was more acceptable to him than dying.     We mere discussed about his sister being his HCP and if he were to have any intention to change her as a HCP. However, he indicated that he was ok with his sister continuing to be his HCP.

## 2021-12-17 NOTE — PROGRESS NOTE ADULT - SUBJECTIVE AND OBJECTIVE BOX
INFECTIOUS DISEASES FOLLOW UP-- Anitra Prater  [phone]    This is a follow up note for this  66yMale with  Sepsis  Serratia bacteremia  COVID infection        ROS:  CONSTITUTIONAL:  No fever, fair appetite  CARDIOVASCULAR:  No chest pain or palpitations  RESPIRATORY:  No dyspnea  GASTROINTESTINAL:  No nausea, vomiting, diarrhea, or abdominal pain  GENITOURINARY:  No dysuria  NEUROLOGIC:  No headache,     Allergies    Amiodarone Hydrochloride (Other (Severe))    Intolerances        ANTIBIOTICS/RELEVANT:  antimicrobials  cefepime   IVPB 2000 milliGRAM(s) IV Intermittent every 8 hours  vancomycin    Solution 125 milliGRAM(s) Oral every 12 hours    immunologic:    OTHER:  aspirin  chewable 81 milliGRAM(s) Oral daily  baclofen 5 milliGRAM(s) Oral every 8 hours  chlorhexidine 2% Cloths 1 Application(s) Topical <User Schedule>  dexAMETHasone  Injectable 6 milliGRAM(s) IV Push daily  dextrose 50% Injectable 50 milliLiter(s) IV Push every 15 minutes  dextrose 50% Injectable 25 milliLiter(s) IV Push every 15 minutes  enoxaparin Injectable 40 milliGRAM(s) SubCutaneous every 12 hours  hydrALAZINE 20 milliGRAM(s) Oral every 8 hours  insulin lispro (ADMELOG) corrective regimen sliding scale   SubCutaneous three times a day before meals  insulin lispro (ADMELOG) corrective regimen sliding scale   SubCutaneous at bedtime  methimazole 10 milliGRAM(s) Oral daily  metoclopramide Injectable 10 milliGRAM(s) IV Push every 8 hours  mirtazapine 7.5 milliGRAM(s) Oral at bedtime  pantoprazole  Injectable 40 milliGRAM(s) IV Push every 12 hours  potassium phosphate / sodium phosphate Tablet (K-PHOS No. 2) 1 Tablet(s) Oral two times a day  sertraline 100 milliGRAM(s) Oral daily  sodium chloride 0.9% lock flush 3 milliLiter(s) IV Push every 8 hours  spironolactone 25 milliGRAM(s) Oral daily      Objective:  Vital Signs Last 24 Hrs  T(C): 36.8 (17 Dec 2021 16:00), Max: 37.2 (17 Dec 2021 08:00)  T(F): 98.2 (17 Dec 2021 16:00), Max: 99 (17 Dec 2021 08:00)  HR: 109 (17 Dec 2021 17:00) (86 - 109)  BP: --  BP(mean): --  RR: 22 (17 Dec 2021 17:00) (14 - 24)  SpO2: 100% (17 Dec 2021 17:00) (95% - 100%)    PHYSICAL EXAM:  Constitutional:no acute distress  Eyes:ALOSNO, EOMI  Ear/Nose/Throat: no oral lesions, trach stoma site no erythema	  Respiratory: audible bilat  Cardiovascular: S1S2 VAD sounds  Gastrointestinal:soft, cholecystotomy tube RUQ bilious drainage  LVAd exit site dressign intact- cotnroller? malfunction to be investigated  Extremities:no e/e/c  No Lymphadenopathy  IV sites not inflammed.    LABS:                        9.6    11.46 )-----------( 163      ( 17 Dec 2021 01:24 )             29.9     12-17    138  |  102  |  17  ----------------------------<  178<H>  3.7   |  27  |  0.54    Ca    8.5      17 Dec 2021 01:24  Phos  1.3     12-17  Mg     1.8     12-17    TPro  7.0  /  Alb  2.7<L>  /  TBili  0.3  /  DBili  x   /  AST  26  /  ALT  20  /  AlkPhos  143<H>  12-17    PT/INR - ( 17 Dec 2021 01:24 )   PT: 13.1 sec;   INR: 1.10 ratio         PTT - ( 17 Dec 2021 01:24 )  PTT:30.6 sec      MICROBIOLOGY:            RECENT CULTURES:  12-13 @ 05:50  .Blood Blood-Peripheral  Blood Culture PCR  Blood Culture PCR  PCR    Growth in aerobic bottle: Serratia marcescens  See previous culture [id number]  --      RADIOLOGY & ADDITIONAL STUDIES:    < from: US Abdomen Upper Quadrant Right (12.17.21 @ 13:35) >    IMPRESSION:    Technically limited portable examination.    Gallbladder is contracted. Cholecystostomy tube is not well visualized.    No biliary ductal dilatation.    < end of copied text >

## 2021-12-17 NOTE — PROGRESS NOTE ADULT - PROBLEM SELECTOR PLAN 4
- Can deline  - Encourage PO fluid intake given low CVP, goal 6-8  - Start hydralazine 20 mg TID and spironolactone 25 mg daily  - Daily PT  - Encourage oral intake and nutrition

## 2021-12-17 NOTE — PROGRESS NOTE ADULT - PROBLEM SELECTOR PLAN 1
Unclear etiology; however, I agree with ID on that this may be related to diaphragmatic irritation so doing an abdominal ultrasound or a CT abdomen is probably recommended.   Meanwhile and since this is impairing the patient's QOL, I will advise Baclofen 5mg PO tid.  d/w PATIENCE Anaya.

## 2021-12-17 NOTE — PHYSICAL THERAPY INITIAL EVALUATION ADULT - PERTINENT HX OF CURRENT PROBLEM, REHAB EVAL
66 y/o M w/ PMH of VAD palcement, history of COVID infeciton in April 2020, history of a prolonged hospitalization in 2021 with recurrent sepsis, pneumonia, intubated/trach x2 cycles , chronic gall bladder disease s/p perc dawn, SMA ischemia reuiring a stent placement, cachecxia who was discharged to rehab but is presented from [facility] on 12/13 with AMS, hypotensive, tachycardic found to be COVID (+). Pt required ICU admission and pressor support. 64 y/o M w/ PMH of VAD placement, history of COVID infection in April 2020, history of a prolonged hospitalization in 2021 with recurrent sepsis, pneumonia, intubated/trach x2 cycles, chronic gall bladder disease s/p perc dawn, SMA ischemia requiring stent placement, cachecxia who was discharged to rehab but is presented from [facility] on 12/13 with AMS, hypotensive, tachycardic found to be COVID (+). Pt required ICU admission and pressor support.

## 2021-12-17 NOTE — PROVIDER CONTACT NOTE (OTHER) - ACTION/TREATMENT ORDERED:
Pt  changed while pt was on telemetry laying  in bed with shift RN at the bedside New Controller SN: [id number] After the change the pt is laying comfortably in bed.

## 2021-12-17 NOTE — PROGRESS NOTE ADULT - PROBLEM SELECTOR PLAN 8
Septal reconstruction with partial resection of inferior turbinates performed yesterday. Packing removed from both sides. Both nares topically decongested and anesthetized. Good airway. And will use saline and Afrin spray. Follow-up: 3 days. - Likely medication related and cholecystitis related, overall stable

## 2021-12-17 NOTE — PROGRESS NOTE ADULT - PROBLEM SELECTOR PLAN 2
- Dr. Prater, ID following  - COVID PCR positive 12/13  - s/p monoclonal antibodies  - Continue course of remdesivir and dexamethasone  - Serratia bacteremia- continue IV cefepime per ID  - gallbladder US unremarkable

## 2021-12-17 NOTE — PROGRESS NOTE ADULT - PROBLEM SELECTOR PLAN 3
Will continue to f/u for symptoms.         Francisco Burgos MD   Geriatrics and Palliative Care ([facility]) Consult Service    of Geriatric and Palliative Medicine  [facility]      Please page the following number for clinical matters between the hours of 9 am and 5 pm from Monday through Friday : [phone]    After 5pm and on weekends, please see the contact information below:    In the event of newly developing, evolving, or worsening symptoms, please contact the Palliative Medicine team via pager (if the patient is at [facility] #[phone] or if the patient is at [facility] #[phone]) The Geriatric and Palliative Medicine service has coverage 24 hours a day/ 7 days a week to provide medical recommendations regarding symptom management needs via telephone

## 2021-12-17 NOTE — PROGRESS NOTE ADULT - ASSESSMENT
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 coli UTIs, driveline drainage (1/7/21) and COVID-19 (back in April 2020). Recurrent syncope post LVAD s/p ILR, and chronic abdominal pain and was noted to have a prolong hospital course (6/14-11/16). During that time he has multiple infections and now remains on suppressive antibiotics. Underwent SMA stent with Vascular in 10/2021, now tolerating PO diet, perc dawn drain placement and mutiple GI bleeds. Ultimately was trach and discharged to rehab to get stronger. Patient returned from rehab for trach decannulation, which was removed on 12/2. Now presented from [facility] on 12/13 with AMS, hypotensive, tachycardic found to be COVID (+). Pt was lethargic and mumbling words. However, his mental status improved. Palliative care was called for ACP.     Off note, the patient was admitted between 6/14 and 11/17 then between 12/2 and 12/6 and note is readmitted for the reasons above. As per HF, the patient rescinded his code status and he is now full code. Palliative care was called for GOC.

## 2021-12-17 NOTE — PROGRESS NOTE ADULT - SUBJECTIVE AND OBJECTIVE BOX
Late entry for 12/16 when the patient was seen and examined at 14:15   ID # [id number]  HPI:  65M PMH ACC/AHA stage D HF due to NICM HM2 LVAD , TV annuloplasty ring 9/12/17 as destination therapy due to severe peripheral artery disease with significant stenosis  SIADH, Depression, CKD-3 with hyperkalemia, past E. coli UTIs, driveline drainage (1/7/21) and COVID-19 (back in April 2020). Recurrent syncope post LVAD s/p ILR, and chronic abdominal pain and was noted to have a prolong hospital course (6/14-11/16). During that time he has multiple infections and now remains on suppressive antibiotics. Underwent SMA stent with Vascular in 10/2021, now tolerating PO diet, perc dawn drain placement and mutiple GI bleeds. Ultimately was trach and discharged to rehab to get stronger. Patient returned from rehab for trach decannulation, which was removed on 12/2.    Now presented from [facility] on 12/13 with AMS, hypotensive, tachycardic found to be COVID (+). Pt is lethargic and mumbling words.     (13 Dec 2021 01:06)    Off note, the patient was admitted between 6/14 and 11/17 then between 12/2 and 12/6 and note is readmitted for the reasons above. As per HF, the patient rescinded his code status and he is now full code. Palliative care was called for GOC.     12/16 The patient was seating on a chair. He denied pain or dyspnea. He was only c/o hiccups which are persistent.    He denied nausea or vomiting. He is tolerating PO.     PERTINENT PM/SXH:   No pertinent past medical history    CHF (congestive heart failure)    CAD (coronary artery disease)    Depression    Pleural effusion    History of 2019 novel coronavirus disease (COVID-19)    Hemorrhoids    Bleeding hemorrhoids    Peripheral arterial disease    Claudication    BPH with urinary obstruction    ACC/AHA stage D systolic heart failure    Anticoagulation goal of INR 2.0 to 2.5    Falls    Clavicle fracture    CKD (chronic kidney disease), stage III    Iron deficiency anemia    H/O epistaxis    Vertigo    GI bleed      No significant past surgical history    S/P TVR (tricuspid valve repair)    S/P ventricular assist device    S/P endoscopy    H/O tracheostomy      FAMILY HISTORY:  No FH of Heart Failure   ITEMS NOT CHECKED ARE NOT PRESENT    SOCIAL HISTORY:   Significant other/partner[ ]  Children[x ]  Adventist/Spirituality:  Substance hx:  [ ]   Tobacco hx:  [ ]   Alcohol hx: [ ]   Home Opioid hx:  [ ] I-Stop Reference No:  Living Situation: [ ]Home  [ ]Long term care  [ ]Rehab [ ]Other    ADVANCE DIRECTIVES:    DNR  MOLST  [ ]  Living Will  [ ]   DECISION MAKER(s):  [x ] Health Care Proxy(s)  [ ] Surrogate(s)  [ ] Guardian           Name(s): Phone Number(s): Alyssa Ogden [phone]    BASELINE (I)ADL(s) (prior to admission):  [place]: [ ]Total  [ ] Moderate [x ]Dependent    Allergies    Amiodarone Hydrochloride (Other (Severe))    Intolerances    MEDICATIONS  (STANDING):  aspirin  chewable 81 milliGRAM(s) Oral daily  cefepime   IVPB 2000 milliGRAM(s) IV Intermittent every 8 hours  chlorhexidine 2% Cloths 1 Application(s) Topical <User Schedule>  dexAMETHasone  Injectable 6 milliGRAM(s) IV Push daily  dextrose 50% Injectable 50 milliLiter(s) IV Push every 15 minutes  dextrose 50% Injectable 25 milliLiter(s) IV Push every 15 minutes  enoxaparin Injectable 40 milliGRAM(s) SubCutaneous every 12 hours  insulin lispro (ADMELOG) corrective regimen sliding scale   SubCutaneous three times a day before meals  insulin lispro (ADMELOG) corrective regimen sliding scale   SubCutaneous at bedtime  magnesium sulfate  IVPB 2 Gram(s) IV Intermittent once  methimazole 10 milliGRAM(s) Oral daily  metoclopramide Injectable 10 milliGRAM(s) IV Push every 8 hours  mirtazapine 7.5 milliGRAM(s) Oral at bedtime  pantoprazole  Injectable 40 milliGRAM(s) IV Push every 12 hours  potassium phosphate / sodium phosphate Tablet (K-PHOS No. 2) 1 Tablet(s) Oral two times a day  remdesivir  IVPB   IV Intermittent   remdesivir  IVPB 100 milliGRAM(s) IV Intermittent every 24 hours  sertraline 100 milliGRAM(s) Oral daily  sodium chloride 0.9% lock flush 3 milliLiter(s) IV Push every 8 hours  vancomycin    Solution 125 milliGRAM(s) Oral every 12 hours  vasopressin Infusion 0.04 Unit(s)/Min (2.4 mL/Hr) IV Continuous <Continuous>    MEDICATIONS  (PRN):      PRESENT SYMPTOMS: [ ]Unable to obtain due to poor mentation   Source if other than patient:  [ ]Family   [ ]Team     Pain: [ ]yes [x ]no  QOL impact -   Location -                    Aggravating factors -  Quality -  Radiation -  Timing-  Severity (0-10 scale):  Minimal acceptable level (0-10 scale):     CPOT:    https://www.[facility].org/getattachment/[email or web address]/Critical-Care-Pain-Observation-Tool-(CPOT)      PAIN AD Score:     http://geriatrictoolkit.[facility]/cog/painad.pdf (press ctrl +  left click to view)    Dyspnea:                           [ ]Mild [ ]Moderate [ ]Severe  Anxiety:                             [ ]Mild [ ]Moderate [ ]Severe  Fatigue:                             [ ]Mild [ ]Moderate [ ]Severe  Nausea:                             [ ]Mild [ ]Moderate [ ]Severe  Loss of appetite:              [ ]Mild [ ]Moderate [ ]Severe  Constipation:                    [ ]Mild [ ]Moderate [ ]Severe    Other Symptoms:  [x ]All other review of systems negative but hiccups     Palliative Performance Status Version 2:     40   %    http://npcrc.org/files/news/palliative_performance_scale_ppsv2.pdf  PHYSICAL EXAM:  Vital Signs Last 24 Hrs  T(C): 36.4  T(F):97.5  HR: 92  BP: 104/67  BP(mean): --81  RR:16  SpO2:94%      GENERAL:  [ x]Alert  [x ]Oriented x 3  [ ]Lethargic  [x ]Cachexia  [ ]Unarousable  [x ]Verbal  [ ]Non-Verbal  Behavioral:   [ ] Anxiety  [ ] Delirium [ ] Agitation [ ] Other  HEENT:  [ ]Normal   [x ]Dry mouth   [ ]ET Tube/Trach  [ ]Oral lesions  PULMONARY:   [ ]Clear [ ]Tachypnea  [ ]Audible excessive secretions   [ ]Rhonchi        [ ]Right [ ]Left [ ]Bilateral  [ ]Crackles        [ ]Right [ ]Left [ ]Bilateral  [ ]Wheezing     [ ]Right [ ]Left [ ]Bilateral  [x ]Diminished breath sounds [ ]right [ ]left [ x]bilateral  CARDIOVASCULAR:    [x ]Regular [ ]Irregular [ ]Tachy  [ ]Jose [ ]Murmur [x ]Other: LVAD   GASTROINTESTINAL:  [x ]Soft  [ ]Distended   [x ]+BS  [x ]Non tender [ ]Tender  [ ]PEG [ ]OGT/ NGT  Last BM:   GENITOURINARY:  [x ]Normal [ ] Incontinent   [ ]Oliguria/Anuria   [ ]Landrum  MUSCULOSKELETAL:   [ ]Normal   [x ]Weakness  [ ]Bed/Wheelchair bound [ ]Edema  NEUROLOGIC:   [x ]No focal deficits  [ ]Cognitive impairment  [ ]Dysphagia [ ]Dysarthria [ ]Paresis [ ]Other   SKIN:   [ ]Normal    [ ]Rash  [ ]Pressure ulcer(s)       Present on admission [ ]y [ ]n    CRITICAL CARE:  [ ] Shock Present  [ ]Septic [ ]Cardiogenic [ ]Neurologic [ ]Hypovolemic  [ ]  Vasopressors [ ]  Inotropes   [ ]Respiratory failure present [ ]Mechanical ventilation [ ]Non-invasive ventilatory support [ ]High flow    [ ]Acute  [ ]Chronic [ ]Hypoxic  [ ]Hypercarbic [ ]Other  [ ]Other organ failure     LABS:                                   9.6    11.46 )-----------( 163      ( 17 Dec 2021 01:24 )             29.9   12-17    138  |  102  |  17  ----------------------------<  178<H>  3.7   |  27  |  0.54    Ca    8.5      17 Dec 2021 01:24  Phos  1.3     12-17  Mg     1.8     12-17    TPro  7.0  /  Alb  2.7<L>  /  TBili  0.3  /  DBili  x   /  AST  26  /  ALT  20  /  AlkPhos  143<H>  12-17    RADIOLOGY & ADDITIONAL STUDIES: Reviewed.     PROTEIN CALORIE MALNUTRITION PRESENT: [ ]mild [ ]moderate [ ]severe [ ]underweight [ ]morbid obesity  https://www.andeal.org/vault/2148/web/files/ONC/Table_Clinical%20Characteristics%20to%20Document%20Malnutrition-White%20JV%20et%20al%202012.pdf    Height (cm): 182.9 (12-13-21 @ 04:30), 165.1 (12-02-21 @ 13:55), 177.8 (10-18-21 @ 15:55)  Weight (kg): 55.7 (12-13-21 @ 02:12), 53.6 (12-02-21 @ 13:55), 61.6 (10-18-21 @ 15:55)  BMI (kg/m2): 16.7 (12-13-21 @ 04:30), 20.4 (12-13-21 @ 02:12), 19.7 (12-02-21 @ 13:55)    [ ]PPSV2 < or = to 30% [ ]significant weight loss  [ ]poor nutritional intake  [ ]anasarca      [ ]Artificial Nutrition      REFERRALS:   [ ]Chaplaincy  [ ]Hospice  [ ]Child Life  [ ]Social Work  [ ]Case management [ ]Holistic Therapy     Goals of Care Document:

## 2021-12-17 NOTE — PHYSICAL THERAPY INITIAL EVALUATION ADULT - ADDITIONAL COMMENTS
Pt lives w/ his sister in a pvt house has 7 steps at entry +UHR, family assisted w/ ADLs, owns a RW and SC.

## 2021-12-17 NOTE — PROGRESS NOTE ADULT - ASSESSMENT
67 y/o M with a history of Stage D NICM s/p HM2 LVAD in 9/2017 at  (due to severe PAD) with TV ring, multiple GI bleeds, thus maintained off AC, CKD 3prior COVID-19 infection in 4/2020, recurrent syncope post LVAD s/p ILR, s/p prolonged complex hospitalization from 6/14-11/16/2021 initially admitted with abdominal pain complicated by GIB, respiratory failure s/p trach, multiple infections, s/p cholecystotomy tube and SMA stent 10/2021, ultimately discharged to [facility] rehab and then returned to [facility] for trach decannulation 12/2 who now presents with recurrent COVID-19 infection in shock. 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     He has elevated inflammatory markers which are now improving. He received monoclonal antibodies and was started on remdesivir and dexamethasone. He is saturating well on RA and was weaned off pressors 12/16. MAP is at times in the 90s. His renal function is normal and his leukocytosis is improving. His LVAD is functioning well without s/s of pump malfunction.

## 2021-12-17 NOTE — PROGRESS NOTE ADULT - SUBJECTIVE AND OBJECTIVE BOX
Subjective:      Medications:  aspirin  chewable 81 milliGRAM(s) Oral daily  baclofen 5 milliGRAM(s) Oral every 8 hours  cefepime   IVPB 2000 milliGRAM(s) IV Intermittent every 8 hours  chlorhexidine 2% Cloths 1 Application(s) Topical <User Schedule>  dexAMETHasone  Injectable 6 milliGRAM(s) IV Push daily  dextrose 50% Injectable 50 milliLiter(s) IV Push every 15 minutes  dextrose 50% Injectable 25 milliLiter(s) IV Push every 15 minutes  enoxaparin Injectable 40 milliGRAM(s) SubCutaneous every 12 hours  insulin lispro (ADMELOG) corrective regimen sliding scale   SubCutaneous three times a day before meals  insulin lispro (ADMELOG) corrective regimen sliding scale   SubCutaneous at bedtime  methimazole 10 milliGRAM(s) Oral daily  metoclopramide Injectable 10 milliGRAM(s) IV Push every 8 hours  mirtazapine 7.5 milliGRAM(s) Oral at bedtime  pantoprazole  Injectable 40 milliGRAM(s) IV Push every 12 hours  potassium phosphate / sodium phosphate Tablet (K-PHOS No. 2) 1 Tablet(s) Oral two times a day  remdesivir  IVPB   IV Intermittent   remdesivir  IVPB 100 milliGRAM(s) IV Intermittent every 24 hours  sertraline 100 milliGRAM(s) Oral daily  sodium chloride 0.9% lock flush 3 milliLiter(s) IV Push every 8 hours  vancomycin    Solution 125 milliGRAM(s) Oral every 12 hours      ICU Vital Signs Last 24 Hrs  T(C): 37.1, Max: 37.2 (12-17 @ 08:00)  HR: 91 (86 - 109)  BP: --  BP(mean): --  ABP: 125/83 (78/61 - 125/83)  ABP(mean): 101 (69 - 101)  RR: 19 (14 - 24)  SpO2: 98% (95% - 100%)    Weight in k.7 (12-15-21)  Weight in k.2 (12-14-21)      I&O's Summary Last 24 Hrs    IN: 745 mL / OUT: 2550 mL / NET: -1805 mL      Tele:    Physical Exam:    General: No distress. Comfortable.  HEENT: EOM intact.  Neck: JVP not elevated.  Respiratory: Clear to auscultation bilaterally  CV: RRR. Normal S1 and S2. No murmurs, rub, or gallops. Radial pulses normal.  Abdomen: Soft, non-distended, non-tender  Skin: No skin lesions  Extremities: Warm, no edema  Neurology: Non-focal, alert and oriented times three.   Psych: Affect normal    Labs:    ( 21 @ 01:24 )               9.6    11.46 )--------( 163                  29.9     ( 21 @ 01:24 )     138  |  102  |  17  ---------------------<  178  3.7  |  27  |  0.54    Ca 8.5  Phos 1.3  Mg 1.8    ( 21 @ 01:24 )  TPro  7.0  /  Alb  2.7  /  TBili  0.3  /  DBili  x   /  AST  26  /  ALT  20  /  AlkPhos  143  ( 21 @ 00:45 )  TPro  6.7  /  Alb  2.7  /  TBili  0.3  /  DBili  x   /  AST  17  /  ALT  15  /  AlkPhos  114    PTT/PT/INR - ( 21 @ 01:24 )  PTT: 30.6 sec / PT: 13.1 sec / INR: 1.10 ratio            ABG - ( 21 @ 01:22 )  pH: 7.43  / pCO2: 47    / pO2: 79    / HCO3: 31    / Base Excess: 6.1   / SaO2: 98.0       Lactate Dehydrogenase, Serum: 255 (21 @ 01:24)  Lactate Dehydrogenase, Serum: 213 (21 @ 00:45)   Subjective:  - NAEO  - resting comfortably in bed with no complaints    Medications:  aspirin  chewable 81 milliGRAM(s) Oral daily  baclofen 5 milliGRAM(s) Oral every 8 hours  cefepime   IVPB 2000 milliGRAM(s) IV Intermittent every 8 hours  chlorhexidine 2% Cloths 1 Application(s) Topical <User Schedule>  dexAMETHasone  Injectable 6 milliGRAM(s) IV Push daily  dextrose 50% Injectable 50 milliLiter(s) IV Push every 15 minutes  dextrose 50% Injectable 25 milliLiter(s) IV Push every 15 minutes  enoxaparin Injectable 40 milliGRAM(s) SubCutaneous every 12 hours  insulin lispro (ADMELOG) corrective regimen sliding scale   SubCutaneous three times a day before meals  insulin lispro (ADMELOG) corrective regimen sliding scale   SubCutaneous at bedtime  methimazole 10 milliGRAM(s) Oral daily  metoclopramide Injectable 10 milliGRAM(s) IV Push every 8 hours  mirtazapine 7.5 milliGRAM(s) Oral at bedtime  pantoprazole  Injectable 40 milliGRAM(s) IV Push every 12 hours  potassium phosphate / sodium phosphate Tablet (K-PHOS No. 2) 1 Tablet(s) Oral two times a day  remdesivir  IVPB   IV Intermittent   remdesivir  IVPB 100 milliGRAM(s) IV Intermittent every 24 hours  sertraline 100 milliGRAM(s) Oral daily  sodium chloride 0.9% lock flush 3 milliLiter(s) IV Push every 8 hours  vancomycin    Solution 125 milliGRAM(s) Oral every 12 hours      ICU Vital Signs Last 24 Hrs  T(C): 37.1, Max: 37.2 (12-17 @ 08:00)  HR: 91 (86 - 109)  BP: --  BP(mean): --  ABP: 125/83 (78/61 - 125/83)  ABP(mean): 101 (69 - 101)  RR: 19 (14 - 24)  SpO2: 98% (95% - 100%)    Weight in k.7 (12-15-21)  Weight in k.2 (12-14-21)      I&O's Summary Last 24 Hrs    IN: 745 mL / OUT: 2550 mL / NET: -1805 mL      Tele:  90-100s    Physical Exam:    General: Frail. No distress. Comfortable.  HEENT: EOM intact.  Neck: Neck supple. JVP not elevated. No masses  Chest: Clear to auscultation bilaterally  CV: LVAD Hum. palpable radial pulses. No LE edema.  Abdomen: Soft, non-distended, non-tender  Skin: No rashes or skin breakdown. DLES dressing and dawn drain with dressing C/D/I  Neurology: Alert and oriented times three. Sensation intact  Psych: Affect normal    LVAD Interrogation:  HM2  Speed 9200  Flow 5.5  Power 5.7  PI 6.7  Assessment of driveline exit site: driveline dressing c/d/i  Programming changes: no changes made      Labs:    ( 21 @ :24 )               9.6    11.46 )--------( 163                  29.9     ( 21 @ :24 )     138  |  102  |  17  ---------------------<  178  3.7  |  27  |  0.54    Ca 8.5  Phos 1.3  Mg 1.8    ( 21 @ :24 )  TPro  7.0  /  Alb  2.7  /  TBili  0.3  /  DBili  x   /  AST  26  /  ALT  20  /  AlkPhos  143  ( 21 @ 00:45 )  TPro  6.7  /  Alb  2.7  /  TBili  0.3  /  DBili  x   /  AST  17  /  ALT  15  /  AlkPhos  114    PTT/PT/INR - ( 21 @ :24 )  PTT: 30.6 sec / PT: 13.1 sec / INR: 1.10 ratio    ABG - ( 21 @ 01:22 )  pH: 7.43  / pCO2: 47    / pO2: 79    / HCO3: 31    / Base Excess: 6.1   / SaO2: 98.0     Lactate Dehydrogenase, Serum: 255 (21 @ :24)  Lactate Dehydrogenase, Serum: 213 (21 @ 00:45)

## 2021-12-18 NOTE — PROGRESS NOTE ADULT - PROBLEM SELECTOR PLAN 3
- on hydralazine 20 mg TID   - spironolactone 25 mg daily  - Daily PT  - Encourage oral intake and nutrition.

## 2021-12-18 NOTE — PROGRESS NOTE ADULT - SUBJECTIVE AND OBJECTIVE BOX
PROGRESS NOTE:   Authored by SONIA Souza PGY1 Pager: [phone]    Patient is a 66y old  Male who presents with a chief complaint of COVID (+) with AMS and hypotension (18 Dec 2021 09:10)      SUBJECTIVE / OVERNIGHT EVENTS:    ADDITIONAL REVIEW OF SYSTEMS:    MEDICATIONS  (STANDING):  aspirin  chewable 81 milliGRAM(s) Oral daily  baclofen 5 milliGRAM(s) Oral every 8 hours  cefepime   IVPB 2000 milliGRAM(s) IV Intermittent every 8 hours  chlorhexidine 2% Cloths 1 Application(s) Topical <User Schedule>  dexAMETHasone  Injectable 6 milliGRAM(s) IV Push daily  dextrose 50% Injectable 50 milliLiter(s) IV Push every 15 minutes  dextrose 50% Injectable 25 milliLiter(s) IV Push every 15 minutes  enoxaparin Injectable 40 milliGRAM(s) SubCutaneous every 12 hours  hydrALAZINE 20 milliGRAM(s) Oral every 8 hours  insulin lispro (ADMELOG) corrective regimen sliding scale   SubCutaneous three times a day before meals  insulin lispro (ADMELOG) corrective regimen sliding scale   SubCutaneous at bedtime  methimazole 10 milliGRAM(s) Oral daily  metoclopramide Injectable 10 milliGRAM(s) IV Push every 8 hours  mirtazapine 7.5 milliGRAM(s) Oral at bedtime  pantoprazole  Injectable 40 milliGRAM(s) IV Push every 12 hours  potassium phosphate / sodium phosphate Tablet (K-PHOS No. 2) 1 Tablet(s) Oral two times a day  sertraline 100 milliGRAM(s) Oral daily  sodium chloride 0.9% lock flush 3 milliLiter(s) IV Push every 8 hours  spironolactone 25 milliGRAM(s) Oral daily  vancomycin    Solution 125 milliGRAM(s) Oral every 12 hours    MEDICATIONS  (PRN):      CAPILLARY BLOOD GLUCOSE      POCT Blood Glucose.: 247 mg/dL (18 Dec 2021 12:27)  POCT Blood Glucose.: 133 mg/dL (18 Dec 2021 08:34)  POCT Blood Glucose.: 176 mg/dL (17 Dec 2021 21:36)  POCT Blood Glucose.: 154 mg/dL (17 Dec 2021 16:56)    I&O's Summary    17 Dec 2021 07:01  -  18 Dec 2021 07:00  --------------------------------------------------------  IN: 1180 mL / OUT: 1655 mL / NET: -475 mL    18 Dec 2021 07:01  -  18 Dec 2021 13:34  --------------------------------------------------------  IN: 0 mL / OUT: 300 mL / NET: -300 mL        Vital Signs Last 24 Hrs  T(C): 36.5 (18 Dec 2021 11:35), Max: 36.8 (17 Dec 2021 16:00)  T(F): 97.7 (18 Dec 2021 11:35), Max: 98.3 (17 Dec 2021 19:45)  HR: 107 (18 Dec 2021 11:35) (72 - 109)  BP: 102/67 (18 Dec 2021 11:35) (91/69 - 107/81)  BP(mean): 77 (18 Dec 2021 09:00) (77 - 92)  RR: 18 (18 Dec 2021 11:35) (18 - 24)  SpO2: 98% (18 Dec 2021 11:35) (93% - 100%)    CONSTITUTIONAL: NAD, well-developed, well-groomed  EYES: PERRLA; conjunctiva and sclera clear  ENMT: Moist oral mucosa, no pharyngeal injection or exudates; normal dentition  NECK: Supple, no palpable masses; no thyromegaly  RESPIRATORY: Normal respiratory effort; lungs are clear to auscultation bilaterally  CARDIOVASCULAR: Regular rate and rhythm, normal S1 and S2, no murmur/rub/gallop; No lower extremity edema; Peripheral pulses are 2+ bilaterally  ABDOMEN: Nontender to palpation, normoactive bowel sounds, no rebound/guarding; No hepatosplenomegaly  MUSCULOSKELETAL:  Normal gait; no clubbing or cyanosis of digits; no joint swelling or tenderness to palpation  PSYCH: A+O to person, place, and time; affect appropriate  NEUROLOGY: CN 2-12 are intact and symmetric; no gross sensory deficits   SKIN: No rashes; no palpable lesions      LABS:                        9.9    13.12 )-----------( 180      ( 18 Dec 2021 06:16 )             31.5     12-18    139  |  102  |  20  ----------------------------<  134<H>  3.5   |  26  |  0.66    Ca    8.8      18 Dec 2021 06:14  Phos  2.4     12-18  Mg     1.6     12-18    TPro  7.0  /  Alb  2.7<L>  /  TBili  0.3  /  DBili  x   /  AST  26  /  ALT  20  /  AlkPhos  143<H>  12-17    PT/INR - ( 18 Dec 2021 06:16 )   PT: 13.0 sec;   INR: 1.09 ratio         PTT - ( 18 Dec 2021 06:16 )  PTT:30.1 sec            RADIOLOGY & ADDITIONAL TESTS:  Results Reviewed:   Imaging Personally Reviewed:  Electrocardiogram Personally Reviewed:    COORDINATION OF CARE:  Care Discussed with Consultants/Other Providers [Y/N]:  Prior or Outpatient Records Reviewed [Y/N]:   PROGRESS NOTE:   Authored by SONIA Souza PGY1 Pager: [phone]    Patient is a 66y old  Male who presents with a chief complaint of COVID (+) with AMS and hypotension (18 Dec 2021 09:10)      SUBJECTIVE / OVERNIGHT EVENTS:  Patient stable and now on medicine floors.  Assessed at bedside. Reports shortness of breath which has improved over course of admission. Currently on room air  AAO3- Emirati primary language  Denies N/V/F/C/CP/ palpitations abdominal pain    MEDICATIONS  (STANDING):  aspirin  chewable 81 milliGRAM(s) Oral daily  baclofen 5 milliGRAM(s) Oral every 8 hours  cefepime   IVPB 2000 milliGRAM(s) IV Intermittent every 8 hours  chlorhexidine 2% Cloths 1 Application(s) Topical <User Schedule>  dexAMETHasone  Injectable 6 milliGRAM(s) IV Push daily  dextrose 50% Injectable 50 milliLiter(s) IV Push every 15 minutes  dextrose 50% Injectable 25 milliLiter(s) IV Push every 15 minutes  enoxaparin Injectable 40 milliGRAM(s) SubCutaneous every 12 hours  hydrALAZINE 20 milliGRAM(s) Oral every 8 hours  insulin lispro (ADMELOG) corrective regimen sliding scale   SubCutaneous three times a day before meals  insulin lispro (ADMELOG) corrective regimen sliding scale   SubCutaneous at bedtime  methimazole 10 milliGRAM(s) Oral daily  metoclopramide Injectable 10 milliGRAM(s) IV Push every 8 hours  mirtazapine 7.5 milliGRAM(s) Oral at bedtime  pantoprazole  Injectable 40 milliGRAM(s) IV Push every 12 hours  potassium phosphate / sodium phosphate Tablet (K-PHOS No. 2) 1 Tablet(s) Oral two times a day  sertraline 100 milliGRAM(s) Oral daily  sodium chloride 0.9% lock flush 3 milliLiter(s) IV Push every 8 hours  spironolactone 25 milliGRAM(s) Oral daily  vancomycin    Solution 125 milliGRAM(s) Oral every 12 hours    MEDICATIONS  (PRN):      CAPILLARY BLOOD GLUCOSE      POCT Blood Glucose.: 247 mg/dL (18 Dec 2021 12:27)  POCT Blood Glucose.: 133 mg/dL (18 Dec 2021 08:34)  POCT Blood Glucose.: 176 mg/dL (17 Dec 2021 21:36)  POCT Blood Glucose.: 154 mg/dL (17 Dec 2021 16:56)    I&O's Summary    17 Dec 2021 07:01  -  18 Dec 2021 07:00  --------------------------------------------------------  IN: 1180 mL / OUT: 1655 mL / NET: -475 mL    18 Dec 2021 07:01  -  18 Dec 2021 13:34  --------------------------------------------------------  IN: 0 mL / OUT: 300 mL / NET: -300 mL        Vital Signs Last 24 Hrs  T(C): 36.5 (18 Dec 2021 11:35), Max: 36.8 (17 Dec 2021 16:00)  T(F): 97.7 (18 Dec 2021 11:35), Max: 98.3 (17 Dec 2021 19:45)  HR: 107 (18 Dec 2021 11:35) (72 - 109)  BP: 102/67 (18 Dec 2021 11:35) (91/69 - 107/81)  BP(mean): 77 (18 Dec 2021 09:00) (77 - 92)  RR: 18 (18 Dec 2021 11:35) (18 - 24)  SpO2: 98% (18 Dec 2021 11:35) (93% - 100%)    CONSTITUTIONAL: NAD, lying in bed  EYES: EOMI; conjunctiva and sclera clear  ENMT: Moist oral mucosa  NECK: Supple, no JVD. S/p trach decannulation - site bandaged c/d/i  RESPIRATORY: Normal respiratory effort; lungs are clear to auscultation bilaterally. On room air   CARDIOVASCULAR: Regular rate and rhythm, normal S1 and S2, no murmur/rub/gallop  ABDOMEN: Nontender to palpation, normoactive bowel sounds, no rebound/guarding. Abdominal dressing c/d/i  PSYCH: A+O to person, place, and time; affect appropriate  NEUROLOGY: No focal deficits   SKIN: No rashes; no palpable lesions      LABS:                        9.9    13.12 )-----------( 180      ( 18 Dec 2021 06:16 )             31.5     12-18    139  |  102  |  20  ----------------------------<  134<H>  3.5   |  26  |  0.66    Ca    8.8      18 Dec 2021 06:14  Phos  2.4     12-18  Mg     1.6     12-18    TPro  7.0  /  Alb  2.7<L>  /  TBili  0.3  /  DBili  x   /  AST  26  /  ALT  20  /  AlkPhos  143<H>  12-17    PT/INR - ( 18 Dec 2021 06:16 )   PT: 13.0 sec;   INR: 1.09 ratio         PTT - ( 18 Dec 2021 06:16 )  PTT:30.1 sec            RADIOLOGY & ADDITIONAL TESTS:  Results Reviewed:   Imaging Personally Reviewed:  Electrocardiogram Personally Reviewed:    COORDINATION OF CARE:  Care Discussed with Consultants/Other Providers [Y/N]:  Prior or Outpatient Records Reviewed [Y/N]:

## 2021-12-18 NOTE — PROGRESS NOTE ADULT - ASSESSMENT
65 y/o M with a history of Stage D NICM s/p HM2 LVAD in 9/2017 at  (due to severe PAD) with TV ring, multiple GI bleeds, thus maintained off AC, CKD 3prior COVID-19 infection in 4/2020, recurrent syncope post LVAD s/p ILR, s/p prolonged complex hospitalization from 6/14-11/16/2021 initially admitted with abdominal pain complicated by GIB, respiratory failure s/p trach, multiple infections, s/p cholecystotomy tube and SMA stent 10/2021, ultimately discharged to [facility] rehab and then returned to [facility] for trach decannulation 12/2 who now presents with recurrent COVID-19 infection in shock. 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     He has elevated inflammatory markers which are now improving. He received monoclonal antibodies and was started on remdesivir and dexamethasone. He is saturating well on RA and was weaned off pressors 12/16. MAP is at times in the 90s. His renal function is normal and his leukocytosis is improving. His LVAD is functioning well without s/s of pump malfunction.    12/18 Stable vitals.  He needs physical therapy evaluation and possible rehab.    Transferred to Hospitalist service today with continued HF follow up.

## 2021-12-18 NOTE — PROGRESS NOTE ADULT - ATTENDING COMMENTS
seen at bedside. denies any SOB, off oxygen  LVAD interrogation w/o any alarms  9200/flow in 5, no alarms,   controller changed on friday 12/16    off AC due to recurrent GIB    OOB to chair and PT in the room when able

## 2021-12-18 NOTE — PROGRESS NOTE ADULT - PROBLEM SELECTOR PLAN 6
- diet: DASH  - DVT: lovenox BID Per previous records - Endocrine consulted for management of hyperthyroidism in the setting of recent amiodarone use and multiple IV contrast studies. 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 Unable to get a thyroid US due to his trach.   - continue methimazole 10mg daily  - f/u repeat FT4

## 2021-12-18 NOTE — PROGRESS NOTE ADULT - SUBJECTIVE AND OBJECTIVE BOX
Kiana Wheat MD  Cardiology Fellow  [phone]  All Cardiology service information can be found 24/7 on [email or web address], password: [email or web address]      Overnight Events:     Review Of Systems: No chest pain, shortness of breath, or palpitations            Current Meds:  aspirin  chewable 81 milliGRAM(s) Oral daily  baclofen 5 milliGRAM(s) Oral every 8 hours  cefepime   IVPB 2000 milliGRAM(s) IV Intermittent every 8 hours  chlorhexidine 2% Cloths 1 Application(s) Topical <User Schedule>  dexAMETHasone  Injectable 6 milliGRAM(s) IV Push daily  dextrose 50% Injectable 50 milliLiter(s) IV Push every 15 minutes  dextrose 50% Injectable 25 milliLiter(s) IV Push every 15 minutes  enoxaparin Injectable 40 milliGRAM(s) SubCutaneous every 12 hours  hydrALAZINE 20 milliGRAM(s) Oral every 8 hours  insulin lispro (ADMELOG) corrective regimen sliding scale   SubCutaneous three times a day before meals  insulin lispro (ADMELOG) corrective regimen sliding scale   SubCutaneous at bedtime  methimazole 10 milliGRAM(s) Oral daily  metoclopramide Injectable 10 milliGRAM(s) IV Push every 8 hours  mirtazapine 7.5 milliGRAM(s) Oral at bedtime  pantoprazole  Injectable 40 milliGRAM(s) IV Push every 12 hours  potassium phosphate / sodium phosphate Tablet (K-PHOS No. 2) 1 Tablet(s) Oral two times a day  sertraline 100 milliGRAM(s) Oral daily  sodium chloride 0.9% lock flush 3 milliLiter(s) IV Push every 8 hours  spironolactone 25 milliGRAM(s) Oral daily  vancomycin    Solution 125 milliGRAM(s) Oral every 12 hours      Vitals:  T(F): 98.1 (12-18), Max: 98.7 (12-17)  HR: 100 (12-18) (72 - 109)  BP: 103/68 (12-18) (91/69 - 107/81)  BP(mean): 77 (12-18)  ABP: 119/82 (12-17)  ABP(mean): 97 (12-17)  RR: 18 (12-18)  SpO2: 98% (12-18)  CVP(mm Hg): 2 (12-17)  I&O's Summary    17 Dec 2021 07:01  -  18 Dec 2021 07:00  --------------------------------------------------------  IN: 1180 mL / OUT: 1655 mL / NET: -475 mL    18 Dec 2021 07:01  -  18 Dec 2021 09:10  --------------------------------------------------------  IN: 0 mL / OUT: 300 mL / NET: -300 mL        Physical Exam:  Appearance: No acute distress; well appearing  Eyes: PERRL, EOMI, pink conjunctiva  HEENT: Normal oral mucosa  Cardiovascular: RRR, S1, S2, no murmurs, rubs, or gallops; no edema; no JVD  Respiratory: Clear to auscultation bilaterally  Gastrointestinal: soft, non-tender, non-distended with normal bowel sounds  Musculoskeletal: No clubbing; no joint deformity   Neurologic: Non-focal  Lymphatic: No lymphadenopathy  Psychiatry: AAOx3, mood & affect appropriate  Skin: No rashes, ecchymoses, or cyanosis                          9.9    13.12 )-----------( 180      ( 18 Dec 2021 06:16 )             31.5     12-18    139  |  102  |  20  ----------------------------<  134<H>  3.5   |  26  |  0.66    Ca    8.8      18 Dec 2021 06:14  Phos  2.4     12-18  Mg     1.6     12-18    TPro  7.0  /  Alb  2.7<L>  /  TBili  0.3  /  DBili  x   /  AST  26  /  ALT  20  /  AlkPhos  143<H>  12-17    PT/INR - ( 18 Dec 2021 06:16 )   PT: 13.0 sec;   INR: 1.09 ratio         PTT - ( 18 Dec 2021 06:16 )  PTT:30.1 sec           Kiana Wheat MD  Cardiology Fellow  [phone]  All Cardiology service information can be found 24/7 on [email or web address], password: [email or web address]      Overnight Events: No events.     Review Of Systems: No chest pain, shortness of breath, or palpitations            Current Meds:  aspirin  chewable 81 milliGRAM(s) Oral daily  baclofen 5 milliGRAM(s) Oral every 8 hours  cefepime   IVPB 2000 milliGRAM(s) IV Intermittent every 8 hours  chlorhexidine 2% Cloths 1 Application(s) Topical <User Schedule>  dexAMETHasone  Injectable 6 milliGRAM(s) IV Push daily  dextrose 50% Injectable 50 milliLiter(s) IV Push every 15 minutes  dextrose 50% Injectable 25 milliLiter(s) IV Push every 15 minutes  enoxaparin Injectable 40 milliGRAM(s) SubCutaneous every 12 hours  hydrALAZINE 20 milliGRAM(s) Oral every 8 hours  insulin lispro (ADMELOG) corrective regimen sliding scale   SubCutaneous three times a day before meals  insulin lispro (ADMELOG) corrective regimen sliding scale   SubCutaneous at bedtime  methimazole 10 milliGRAM(s) Oral daily  metoclopramide Injectable 10 milliGRAM(s) IV Push every 8 hours  mirtazapine 7.5 milliGRAM(s) Oral at bedtime  pantoprazole  Injectable 40 milliGRAM(s) IV Push every 12 hours  potassium phosphate / sodium phosphate Tablet (K-PHOS No. 2) 1 Tablet(s) Oral two times a day  sertraline 100 milliGRAM(s) Oral daily  sodium chloride 0.9% lock flush 3 milliLiter(s) IV Push every 8 hours  spironolactone 25 milliGRAM(s) Oral daily  vancomycin    Solution 125 milliGRAM(s) Oral every 12 hours      Vitals:  T(F): 98.1 (12-18), Max: 98.7 (12-17)  HR: 100 (12-18) (72 - 109)  BP: 103/68 (12-18) (91/69 - 107/81)  BP(mean): 77 (12-18)  ABP: 119/82 (12-17)  ABP(mean): 97 (12-17)  RR: 18 (12-18)  SpO2: 98% (12-18)  CVP(mm Hg): 2 (12-17)  I&O's Summary    17 Dec 2021 07:01  -  18 Dec 2021 07:00  --------------------------------------------------------  IN: 1180 mL / OUT: 1655 mL / NET: -475 mL    18 Dec 2021 07:01  -  18 Dec 2021 09:10  --------------------------------------------------------  IN: 0 mL / OUT: 300 mL / NET: -300 mL        Physical Exam:  Appearance: No acute distress; well appearing  Eyes: PERRL, EOMI, pink conjunctiva  HEENT: Normal oral mucosa  Cardiovascular: RRR, S1, S2, no murmurs, rubs, or gallops; no edema; no JVD  Respiratory: Clear to auscultation bilaterally  Gastrointestinal: soft, non-tender, non-distended with normal bowel sounds  Musculoskeletal: No clubbing; no joint deformity   Neurologic: Non-focal  Lymphatic: No lymphadenopathy  Psychiatry: AAOx3, mood & affect appropriate  Skin: No rashes, ecchymoses, or cyanosis                          9.9    13.12 )-----------( 180      ( 18 Dec 2021 06:16 )             31.5     12-18    139  |  102  |  20  ----------------------------<  134<H>  3.5   |  26  |  0.66    Ca    8.8      18 Dec 2021 06:14  Phos  2.4     12-18  Mg     1.6     12-18    TPro  7.0  /  Alb  2.7<L>  /  TBili  0.3  /  DBili  x   /  AST  26  /  ALT  20  /  AlkPhos  143<H>  12-17    PT/INR - ( 18 Dec 2021 06:16 )   PT: 13.0 sec;   INR: 1.09 ratio         PTT - ( 18 Dec 2021 06:16 )  PTT:30.1 sec

## 2021-12-18 NOTE — PROGRESS NOTE ADULT - ASSESSMENT
67 y/o M with a history of Stage D NICM s/p HM2 LVAD in 9/2017 at  (due to severe PAD) with TV ring, multiple GI bleeds, thus maintained off AC, CKD 3prior COVID-19 infection in 4/2020, recurrent syncope post LVAD s/p ILR, s/p prolonged complex hospitalization from 6/14-11/16/2021 initially admitted with abdominal pain complicated by GIB, respiratory failure s/p trach, multiple infections, s/p cholecystotomy tube and SMA stent 10/2021, ultimately discharged to [facility] rehab and then returned to [facility] for trach decannulation 12/2 who now presents with recurrent COVID-19 infection in shock. 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     He has elevated inflammatory markers which are now improving. He received monoclonal antibodies and was started on remdesivir and dexamethasone. He is saturating well on RA and was weaned off pressors 12/16. MAP is at times in the 90s. His renal function is normal and his leukocytosis is improving. His LVAD is functioning well without s/s of pump malfunction.

## 2021-12-18 NOTE — PROGRESS NOTE ADULT - PROBLEM SELECTOR PLAN 2
History of recurrent pneumonia and bacteremias   With stenotrophomonas in prior sputum cultures and enterobacter and serratia in blood in October 2021  Blood cultures 12/13 + Serratia  RUQ sonogram negative    - Cefepime 2g IV q8 (12/ 13- )  - repeat bcx pending

## 2021-12-18 NOTE — PROGRESS NOTE ADULT - ATTENDING COMMENTS
64 yo man with a history of VAD placement history of COVID infection in April 2020, history of a prolonged hospitalization in 2021 with recurrent sepsis, pneumonia, intubated/trach x2 cycles , chronic gall bladder disease s/p perc dawn, SMA ischemia requiring a stent placement, cachexia who was discharged to rehab but is presented from [facility] on 12/13 with AMS, hypotensive, tachycardic found to be COVID (+). Pt is lethargic and mumbling words required ICU admission and pressor support now titrated off and downgraded to the floor.     Serratia Bacteremia, source unclear, possibly GI, will discuss with ID regarding obtaining an MRCP  F/u Repeat cultures sent today, if still positive, will discuss with ID regarding any further investigations, otherwise if negative will discuss duration of antibiotics.  COVID19 s/p monoclonal ab, Remdesivir and Dexamethasone currently on room air - will discuss vaccination prior to discharge  Full code - palliative following regarding GOC  Rest of plan as above  D/w team in detail 66 yo man with a history of VAD placement history of COVID infection in April 2020, history of a prolonged hospitalization in 2021 with recurrent sepsis, pneumonia, intubated/trach x2 cycles , chronic gall bladder disease s/p perc dawn, SMA ischemia requiring a stent placement, cachexia who was discharged to rehab but is presented from [facility] on 12/13 with AMS, hypotensive, tachycardic found to be COVID (+). Pt is lethargic and mumbling words required ICU admission and pressor support now titrated off and downgraded to the floor.     Serratia Bacteremia, source unclear, possibly GI, will discuss with ID regarding obtaining an MRCP  On Cefepime since 12/13  F/u Repeat cultures sent today, if still positive, will discuss with ID regarding any further investigations, otherwise if negative will discuss duration of antibiotics.  COVID19 s/p monoclonal ab, Remdesivir and Dexamethasone currently on room air - will discuss vaccination prior to discharge  Full code - palliative following regarding GOC  C diff - on PO Vancomycin  Rest of plan as above  D/w team in detail

## 2021-12-18 NOTE — PROGRESS NOTE ADULT - PROBLEM SELECTOR PLAN 1
COVID PCR + 12/13. Recent CXR clear, respiratory status stable on room air      - s/p monoclonal antibodies Regeneron   - s/p 5 day course of Remdesivir/Dexamethasone

## 2021-12-18 NOTE — PROGRESS NOTE ADULT - SUBJECTIVE AND OBJECTIVE BOX
VITAL SIGNS    Telemetry:  st 108    Vital Signs Last 24 Hrs  T(C): 36.5 (12-18-21 @ 11:35), Max: 36.8 (12-17-21 @ 16:00)  T(F): 97.7 (12-18-21 @ 11:35), Max: 98.3 (12-17-21 @ 19:45)  HR: 107 (12-18-21 @ 11:35) (72 - 109)  BP: 102/67 (12-18-21 @ 11:35) (91/69 - 107/81)  RR: 18 (12-18-21 @ 11:35) (18 - 24)  SpO2: 98% (12-18-21 @ 11:35) (93% - 100%)                   12-17 @ 07:01  -  12-18 @ 07:00  --------------------------------------------------------  IN: 1180 mL / OUT: 1655 mL / NET: -475 mL    12-18 @ 07:01  -  12-18 @ 13:46  --------------------------------------------------------  IN: 0 mL / OUT: 300 mL / NET: -300 mL          Daily     Daily             CAPILLARY BLOOD GLUCOSE      POCT Blood Glucose.: 247 mg/dL (18 Dec 2021 12:27)  POCT Blood Glucose.: 133 mg/dL (18 Dec 2021 08:34)  POCT Blood Glucose.: 176 mg/dL (17 Dec 2021 21:36)  POCT Blood Glucose.: 154 mg/dL (17 Dec 2021 16:56)            Drains:    R cholecystostomy drain        Coumadin    [ ] YES          [ x ]      NO         h/o gi bleed                          PHYSICAL EXAM        Neurology: alert and oriented x 3, nonfocal, no gross deficits  CV : + hum, regular  Sternal Wound :  CDI , Stable  Lungs: cta  Abdomen: soft, nontender, nondistended, positive bowel sounds, driveline dressing cdi                        :    voiding        Extremities:  -    edema   /  -   calve tenderness ,              aspirin  chewable 81 milliGRAM(s) Oral daily  baclofen 5 milliGRAM(s) Oral every 8 hours  cefepime   IVPB 2000 milliGRAM(s) IV Intermittent every 8 hours  chlorhexidine 2% Cloths 1 Application(s) Topical <User Schedule>  dexAMETHasone  Injectable 6 milliGRAM(s) IV Push daily  dextrose 50% Injectable 50 milliLiter(s) IV Push every 15 minutes  dextrose 50% Injectable 25 milliLiter(s) IV Push every 15 minutes  enoxaparin Injectable 40 milliGRAM(s) SubCutaneous every 12 hours  hydrALAZINE 20 milliGRAM(s) Oral every 8 hours  insulin lispro (ADMELOG) corrective regimen sliding scale   SubCutaneous three times a day before meals  insulin lispro (ADMELOG) corrective regimen sliding scale   SubCutaneous at bedtime  methimazole 10 milliGRAM(s) Oral daily  metoclopramide Injectable 10 milliGRAM(s) IV Push every 8 hours  mirtazapine 7.5 milliGRAM(s) Oral at bedtime  pantoprazole  Injectable 40 milliGRAM(s) IV Push every 12 hours  potassium phosphate / sodium phosphate Tablet (K-PHOS No. 2) 1 Tablet(s) Oral two times a day  sertraline 100 milliGRAM(s) Oral daily  sodium chloride 0.9% lock flush 3 milliLiter(s) IV Push every 8 hours  spironolactone 25 milliGRAM(s) Oral daily  vancomycin    Solution 125 milliGRAM(s) Oral every 12 hours                    Physical Therapy Rec:   Home  [  ]   Home w/ PT  [  ]  Rehab  [  ]  Discussed with Cardiothoracic Team at AM rounds.

## 2021-12-18 NOTE — PROGRESS NOTE ADULT - PROBLEM SELECTOR PLAN 4
- Encourage PO fluid intake given low CVP, goal 6-8  - on hydralazine 20 mg TID and spironolactone 25 mg daily  - Daily PT  - Encourage oral intake and nutrition

## 2021-12-19 NOTE — PROGRESS NOTE ADULT - ATTENDING COMMENTS
66 yo man with a history of VAD placement history of COVID infection in April 2020, history of a prolonged hospitalization in 2021 with recurrent sepsis, pneumonia, intubated/trach x2 cycles , chronic gall bladder disease s/p perc dawn, SMA ischemia requiring a stent placement, cachexia who was discharged to rehab but is presented from [facility] on 12/13 with AMS, hypotensive, tachycardic found to be COVID (+). Pt is lethargic and mumbling words required ICU admission and pressor support now titrated off and downgraded to the floor.     Serratia Bacteremia, source unclear, possibly GI, will discuss with ID regarding obtaining an MRCP  On Cefepime since 12/13  Leukocytosis trending up, may be reactive due to dexamethasone - repeat blood culture 12/18 NGTD  COVID19 s/p monoclonal ab, Remdesivir and Dexamethasone currently on room air - will discuss vaccination prior to discharge  Full code - palliative following regarding GOC  C diff - on PO Vancomycin  Rest of plan as above  D/w team in detail 64 yo man with a history of VAD placement history of COVID infection in April 2020, history of a prolonged hospitalization in 2021 with recurrent sepsis, pneumonia, intubated/trach x2 cycles , chronic gall bladder disease s/p perc dawn, SMA ischemia requiring a stent placement, cachexia who was discharged to rehab but is presented from [facility] on 12/13 with AMS, hypotensive, tachycardic found to be COVID (+). Pt is lethargic and mumbling words required ICU admission and pressor support now titrated off and downgraded to the floor.     Serratia Bacteremia, source unclear, possibly GI, will discuss with ID regarding obtaining an MRCP  On Cefepime since 12/13  Leukocytosis trending up, may be reactive due to dexamethasone - repeat blood culture 12/18 NGTD  COVID19 s/p monoclonal ab, Remdesivir and Dexamethasone currently on room air - will discuss vaccination prior to discharge  Intractable Hiccups - on Baclofen  Amiodarone Toxicity - on Methimazole, repeat FT4 tomorrow  Full code - palliative following regarding GOC  C diff - on PO Vancomycin  Rest of plan as above  D/w team in detail

## 2021-12-19 NOTE — PROGRESS NOTE ADULT - SUBJECTIVE AND OBJECTIVE BOX
PROGRESS NOTE:   Authored by SONIA Souza PGY1 Pager: [phone]    Patient is a 66y old  Male who presents with a chief complaint of COVID (+) with AMS and hypotension (18 Dec 2021 13:46)      SUBJECTIVE / OVERNIGHT EVENTS:    ADDITIONAL REVIEW OF SYSTEMS:    MEDICATIONS  (STANDING):  aspirin  chewable 81 milliGRAM(s) Oral daily  baclofen 5 milliGRAM(s) Oral every 8 hours  cefepime   IVPB 2000 milliGRAM(s) IV Intermittent every 8 hours  chlorhexidine 2% Cloths 1 Application(s) Topical <User Schedule>  dexAMETHasone  Injectable 6 milliGRAM(s) IV Push daily  dextrose 50% Injectable 50 milliLiter(s) IV Push every 15 minutes  dextrose 50% Injectable 25 milliLiter(s) IV Push every 15 minutes  enoxaparin Injectable 40 milliGRAM(s) SubCutaneous every 12 hours  hydrALAZINE 20 milliGRAM(s) Oral every 8 hours  insulin lispro (ADMELOG) corrective regimen sliding scale   SubCutaneous three times a day before meals  insulin lispro (ADMELOG) corrective regimen sliding scale   SubCutaneous at bedtime  methimazole 10 milliGRAM(s) Oral daily  metoclopramide Injectable 10 milliGRAM(s) IV Push every 8 hours  mirtazapine 7.5 milliGRAM(s) Oral at bedtime  pantoprazole  Injectable 40 milliGRAM(s) IV Push every 12 hours  potassium chloride   Powder 40 milliEquivalent(s) Oral once  potassium phosphate / sodium phosphate Tablet (K-PHOS No. 2) 1 Tablet(s) Oral two times a day  sertraline 100 milliGRAM(s) Oral daily  sodium chloride 0.9% lock flush 3 milliLiter(s) IV Push every 8 hours  spironolactone 25 milliGRAM(s) Oral daily  vancomycin    Solution 125 milliGRAM(s) Oral every 12 hours    MEDICATIONS  (PRN):      CAPILLARY BLOOD GLUCOSE      POCT Blood Glucose.: 231 mg/dL (18 Dec 2021 21:52)  POCT Blood Glucose.: 238 mg/dL (18 Dec 2021 16:46)  POCT Blood Glucose.: 247 mg/dL (18 Dec 2021 12:27)  POCT Blood Glucose.: 133 mg/dL (18 Dec 2021 08:34)    I&O's Summary    18 Dec 2021 07:01  -  19 Dec 2021 07:00  --------------------------------------------------------  IN: 240 mL / OUT: 1200 mL / NET: -960 mL        Vital Signs Last 24 Hrs  T(C): 36.5 (19 Dec 2021 05:38), Max: 37.3 (18 Dec 2021 20:52)  T(F): 97.7 (19 Dec 2021 05:38), Max: 99.1 (18 Dec 2021 20:52)  HR: 97 (19 Dec 2021 05:38) (97 - 107)  BP: 109/71 (19 Dec 2021 05:38) (101/71 - 109/71)  BP(mean): 84 (19 Dec 2021 06:00) (77 - 85)  RR: 18 (19 Dec 2021 05:38) (18 - 18)  SpO2: 99% (19 Dec 2021 05:38) (98% - 99%)    CONSTITUTIONAL: NAD, well-developed, well-groomed  EYES: PERRLA; conjunctiva and sclera clear  ENMT: Moist oral mucosa, no pharyngeal injection or exudates; normal dentition  NECK: Supple, no palpable masses; no thyromegaly  RESPIRATORY: Normal respiratory effort; lungs are clear to auscultation bilaterally  CARDIOVASCULAR: Regular rate and rhythm, normal S1 and S2, no murmur/rub/gallop; No lower extremity edema; Peripheral pulses are 2+ bilaterally  ABDOMEN: Nontender to palpation, normoactive bowel sounds, no rebound/guarding; No hepatosplenomegaly  MUSCULOSKELETAL:  Normal gait; no clubbing or cyanosis of digits; no joint swelling or tenderness to palpation  PSYCH: A+O to person, place, and time; affect appropriate  NEUROLOGY: CN 2-12 are intact and symmetric; no gross sensory deficits   SKIN: No rashes; no palpable lesions  PHYSICAL EXAM:    LABS:                        9.9    15.31 )-----------( 197      ( 19 Dec 2021 06:37 )             31.0     12-19    137  |  99  |  19  ----------------------------<  133<H>  3.3<L>   |  27  |  0.67    Ca    8.6      19 Dec 2021 06:37  Phos  2.2     12-19  Mg     1.6     12-19    TPro  7.0  /  Alb  2.7<L>  /  TBili  0.3  /  DBili  x   /  AST  12  /  ALT  15  /  AlkPhos  116  12-19    PT/INR - ( 18 Dec 2021 06:16 )   PT: 13.0 sec;   INR: 1.09 ratio         PTT - ( 18 Dec 2021 06:16 )  PTT:30.1 sec            RADIOLOGY & ADDITIONAL TESTS:  Results Reviewed:   Imaging Personally Reviewed:  Electrocardiogram Personally Reviewed:    COORDINATION OF CARE:  Care Discussed with Consultants/Other Providers [Y/N]:  Prior or Outpatient Records Reviewed [Y/N]:   PROGRESS NOTE:   Authored by SONIA Souza PGY1 Pager: [phone]    Patient is a 66y old  Male who presents with a chief complaint of COVID (+) with AMS and hypotension (18 Dec 2021 13:46)      SUBJECTIVE / OVERNIGHT EVENTS:  NAEO. Respiratory status stable on room air. AAO3.   Pt urinating and having BMs    MEDICATIONS  (STANDING):  aspirin  chewable 81 milliGRAM(s) Oral daily  baclofen 5 milliGRAM(s) Oral every 8 hours  cefepime   IVPB 2000 milliGRAM(s) IV Intermittent every 8 hours  chlorhexidine 2% Cloths 1 Application(s) Topical <User Schedule>  dexAMETHasone  Injectable 6 milliGRAM(s) IV Push daily  dextrose 50% Injectable 50 milliLiter(s) IV Push every 15 minutes  dextrose 50% Injectable 25 milliLiter(s) IV Push every 15 minutes  enoxaparin Injectable 40 milliGRAM(s) SubCutaneous every 12 hours  hydrALAZINE 20 milliGRAM(s) Oral every 8 hours  insulin lispro (ADMELOG) corrective regimen sliding scale   SubCutaneous three times a day before meals  insulin lispro (ADMELOG) corrective regimen sliding scale   SubCutaneous at bedtime  methimazole 10 milliGRAM(s) Oral daily  metoclopramide Injectable 10 milliGRAM(s) IV Push every 8 hours  mirtazapine 7.5 milliGRAM(s) Oral at bedtime  pantoprazole  Injectable 40 milliGRAM(s) IV Push every 12 hours  potassium chloride   Powder 40 milliEquivalent(s) Oral once  potassium phosphate / sodium phosphate Tablet (K-PHOS No. 2) 1 Tablet(s) Oral two times a day  sertraline 100 milliGRAM(s) Oral daily  sodium chloride 0.9% lock flush 3 milliLiter(s) IV Push every 8 hours  spironolactone 25 milliGRAM(s) Oral daily  vancomycin    Solution 125 milliGRAM(s) Oral every 12 hours    MEDICATIONS  (PRN):      CAPILLARY BLOOD GLUCOSE      POCT Blood Glucose.: 231 mg/dL (18 Dec 2021 21:52)  POCT Blood Glucose.: 238 mg/dL (18 Dec 2021 16:46)  POCT Blood Glucose.: 247 mg/dL (18 Dec 2021 12:27)  POCT Blood Glucose.: 133 mg/dL (18 Dec 2021 08:34)    I&O's Summary    18 Dec 2021 07:01  -  19 Dec 2021 07:00  --------------------------------------------------------  IN: 240 mL / OUT: 1200 mL / NET: -960 mL        Vital Signs Last 24 Hrs  T(C): 36.5 (19 Dec 2021 05:38), Max: 37.3 (18 Dec 2021 20:52)  T(F): 97.7 (19 Dec 2021 05:38), Max: 99.1 (18 Dec 2021 20:52)  HR: 97 (19 Dec 2021 05:38) (97 - 107)  BP: 109/71 (19 Dec 2021 05:38) (101/71 - 109/71)  BP(mean): 84 (19 Dec 2021 06:00) (77 - 85)  RR: 18 (19 Dec 2021 05:38) (18 - 18)  SpO2: 99% (19 Dec 2021 05:38) (98% - 99%)      CONSTITUTIONAL: NAD, lying in bed  EYES: EOMI; conjunctiva and sclera clear  ENMT: Moist oral mucosa  NECK: Supple, no JVD. S/p trach decannulation - site bandaged c/d/i  RESPIRATORY: Normal respiratory effort; lungs are clear to auscultation bilaterally. On room air   CARDIOVASCULAR: Regular rate and rhythm, normal S1 and S2, no murmur/rub/gallop  ABDOMEN: Nontender to palpation, normoactive bowel sounds, no rebound/guarding. Abdominal dressing c/d/i. Drain - green, bilious output  PSYCH: A+O to person, place, and time; affect appropriate  NEUROLOGY: No focal deficits   SKIN: No rashes; no palpable lesions    LABS:                        9.9    15.31 )-----------( 197      ( 19 Dec 2021 06:37 )             31.0     12-19    137  |  99  |  19  ----------------------------<  133<H>  3.3<L>   |  27  |  0.67    Ca    8.6      19 Dec 2021 06:37  Phos  2.2     12-19  Mg     1.6     12-19    TPro  7.0  /  Alb  2.7<L>  /  TBili  0.3  /  DBili  x   /  AST  12  /  ALT  15  /  AlkPhos  116  12-19    PT/INR - ( 18 Dec 2021 06:16 )   PT: 13.0 sec;   INR: 1.09 ratio         PTT - ( 18 Dec 2021 06:16 )  PTT:30.1 sec            RADIOLOGY & ADDITIONAL TESTS:  Results Reviewed:   Imaging Personally Reviewed:  Electrocardiogram Personally Reviewed:    COORDINATION OF CARE:  Care Discussed with Consultants/Other Providers [Y/N]:  Prior or Outpatient Records Reviewed [Y/N]:

## 2021-12-19 NOTE — PROGRESS NOTE ADULT - PROBLEM SELECTOR PLAN 6
Per previous records - Endocrine consulted for management of hyperthyroidism in the setting of recent amiodarone use and multiple IV contrast studies. 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 Unable to get a thyroid US due to his trach.   - continue methimazole 10mg daily  - f/u repeat FT4

## 2021-12-20 NOTE — PROGRESS NOTE ADULT - ATTENDING COMMENTS
66 yo man with a history of VAD placement history of COVID infection in April 2020, history of a prolonged hospitalization in 2021 with recurrent sepsis, pneumonia, intubated/trach x2 cycles , chronic gall bladder disease s/p perc dawn, SMA ischemia requiring a stent placement, cachexia who was discharged to rehab but is presented from [facility] on 12/13 with AMS, hypotensive, tachycardic found to be COVID (+). Pt is lethargic and mumbling words required ICU admission and pressor support now titrated off and downgraded to the floor.     Serratia Bacteremia, source unclear, possibly GI, will discuss with ID regarding obtaining an MRCP today  On Cefepime since 12/13  Leukocytosis trending up, may be reactive due to dexamethasone - repeat blood culture 12/18 NGTD  COVID19 s/p monoclonal ab, Remdesivir and Dexamethasone currently on room air - will discuss vaccination prior to discharge  Intractable Hiccups - on Baclofen, doing well  Amiodarone Toxicity - on Methimazole, repeat FT4 today  Full code - palliative following regarding GOC  C diff - on PO Vancomycin  Rest of plan as above  D/w team in detail

## 2021-12-20 NOTE — PROGRESS NOTE ADULT - PROBLEM SELECTOR PLAN 5
- history of thyrotoxicosis with amio, on methimazole  - start Toprol XL 25mg daily, first dose now  - Electrolyte repletion to maintain K 4-4.5 and Mg 2-2.5

## 2021-12-20 NOTE — SWALLOW BEDSIDE ASSESSMENT ADULT - SLP PERTINENT HISTORY OF CURRENT PROBLEM
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 coli UTIs, driveline drainage (1/7/21) and COVID-19 (back in April 2020). Recurrent syncope post LVAD s/p ILR, and chronic abdominal pain and was noted to have a prolong hospital course (6/14-11/16). During that time he has multiple infections and now remains on suppressive antibiotics. Underwent SMA stent with Vascular in 10/2021, now tolerating PO diet, perc dawn drain placement and mutiple GI bleeds. Ultimately was trach and discharged to rehab to get stronger. Patient returned from rehab for trach decannulation, which was removed on 12/2. Now presented from [facility] on 12/13 with AMS, hypotensive, tachycardic found to be COVID (+).

## 2021-12-20 NOTE — PROGRESS NOTE ADULT - ASSESSMENT
67 y/o M with a history of Stage D NICM s/p HM2 LVAD in 9/2017 at  (due to severe PAD) with TV ring, multiple GI bleeds, thus maintained off AC, CKD 3prior COVID-19 infection in 4/2020, recurrent syncope post LVAD s/p ILR, s/p prolonged complex hospitalization from 6/14-11/16/2021 initially admitted with abdominal pain complicated by GIB, respiratory failure s/p trach, multiple infections, s/p cholecystotomy tube and SMA stent 10/2021, ultimately discharged to [facility] rehab and then returned to [facility] for trach decannulation 12/2 who now presents with recurrent COVID-19 infection in shock. 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     He has elevated inflammatory markers which are now improving. He received monoclonal antibodies and was started on remdesivir and dexamethasone. He's afebrile with BCx from 12/18 NGTD. He is saturating well on RA and was weaned off pressors 12/16. His MAPs are at times above goal of 70-80 on low dose vasodilators. His LDH is stable 227 from 255. His LVAD is functioning well without s/s of pump malfunction. 65 y/o M with a history of Stage D NICM s/p HM2 LVAD in 9/2017 at  (due to severe PAD) with TV ring, multiple GI bleeds, thus maintained off AC, CKD 3prior COVID-19 infection in 4/2020, recurrent syncope post LVAD s/p ILR, s/p prolonged complex hospitalization from 6/14-11/16/2021 initially admitted with abdominal pain complicated by GIB, respiratory failure s/p trach, multiple infections, s/p cholecystotomy tube and SMA stent 10/2021, ultimately discharged to [facility] rehab and then returned to [facility] for trach decannulation 12/2 who now presents with recurrent COVID-19 infection, initially in distributive shock. 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     He had elevated inflammatory markers which are now improving. He received monoclonal antibodies and was started on remdesivir and dexamethasone. He's afebrile with BCx from 12/18 NGTD. He is saturating well on RA and was weaned off pressors 12/16. His MAPs are at times above goal of 70-80 on low dose vasodilators. His LDH is stable 227 from 255. His LVAD is functioning well without s/s of pump malfunction. He had runs of MMVT on 12/20 in setting of hypokalemia and being off his beta blocker.     He is nearing readiness for discharge but will need PT evaluation as he appears more deconditioned.

## 2021-12-20 NOTE — PROGRESS NOTE ADULT - ASSESSMENT
66yo man with a history of VAD palcement, history of COVID infeciton in April 2020, history of a prolonged hospitalization in 2021 with recurrent sepsis, pneumonia, intubated/trach x2 cycles , chronic gall bladder disease s/p perc dawn, SMA ischemia reuiring a stent placement, cachecxia who was discharged to rehab but is presented from [facility] on 12/13 with AMS, hypotensive, tachycardic found to be COVID (+). Pt is lethargic and mumbling words required ICU admission and pressor support.    More awake, alert, interactive   oxygen nasal requirements with reasonable saturation    COVID infection-   confirmed second bout of COVID by documented PCR testing  Ideally, would be interested in sequencing the virus to determine strain (omicron vs delta)  Received a dose of monoclonal antibodies Regeneron product   Day 5 of Remdesivir/Dexamethasone with Remdesivir to complete today  Refuses most vaccines- but will need to wait three months  to receive COVID vaccine series post monoclonal  Will administer flu/pneumonia vaccines this admission prior to discharge if he will permit        Serratia Bacteremia:  History of recurrent pneumonia and bacteremias   With stenotrophomonas in prior sputum cultures and enterobacter and serratia in blood in October 2021  Blood cultures sent 12/13 growing serratia-    Cefepime to 2 gram IV q 8hr as strain is sensitive to Cefepime   repeat blood cultures for evidence of clearing are pending   RUQ sonogram done to reassess- gall bladder does not show acute cholecystitits  Hiccups- possibly related to diaphragm irritation    Source of serratia not entirely clear- differential GI translocation, biliary tree, lungs, other including LVAD      Prior severe C.diff infection   pre-emptive oral Vanco 125mg bid    discussed with CHF team    Morris Prater MD  [phone]  After 5pm/weekends [phone]   64yo man with a history of VAD palcement, history of COVID infeciton in April 2020, history of a prolonged hospitalization in 2021 with recurrent sepsis, pneumonia, intubated/trach x2 cycles , chronic gall bladder disease s/p perc dawn, SMA ischemia reuiring a stent placement, cachecxia who was discharged to rehab but is presented from [facility] on 12/13 with AMS, hypotensive, tachycardic found to be COVID (+). Pt is lethargic and mumbling words required ICU admission and pressor support.    More awake, alert, interactive   oxygen nasal requirements with reasonable saturation    COVID infection-   confirmed second bout of COVID by documented PCR testing  Ideally, would be interested in sequencing the virus to determine strain (omicron vs delta)  Received a dose of monoclonal antibodies Regeneron product   Day 5 of Remdesivir/Dexamethasone with Remdesivir to complete today  Refuses most vaccines- but will need to wait three months  to receive COVID vaccine series post monoclonal  Will administer flu/pneumonia vaccines this admission prior to discharge if he will permit        Serratia Bacteremia:  History of recurrent pneumonia and bacteremias   With stenotrophomonas in prior sputum cultures and enterobacter and serratia in blood in October 2021  Blood cultures sent 12/13 growing serratia-    Cefepime to 2 gram IV q 8hr as strain is sensitive to Cefepime   repeat blood cultures for evidence of clearing are pending   RUQ sonogram done to reassess- gall bladder does not show acute cholecystitits  Hiccups- possibly related to diaphragm irritation    Source of serratia not entirely clear- differential GI translocation, biliary tree, lungs, other including LVAD      Prior severe C.diff infection   pre-emptive oral Vanco 125mg bid    Will need suppressive oral quinolone post treatment of bacteremia x2weeks    discussed with CHF team    Morris Prater MD  [phone]  After 5pm/weekends [phone]

## 2021-12-20 NOTE — PROGRESS NOTE ADULT - SUBJECTIVE AND OBJECTIVE BOX
**************************  Bre El  PGY-1 Internal Medicine  [phone]  **************************    Patient is a 66y old  Male who presents with a chief complaint of COVID (+) with AMS and hypotension (19 Dec 2021 07:17)      SUBJECTIVE / OVERNIGHT EVENTS:  Overnight, patient with intractable hiccups. This morning, hiccups have resolved. Denies difficulty breathing, pain, all other ROS negative.    MEDICATIONS  (STANDING):  aspirin  chewable 81 milliGRAM(s) Oral daily  baclofen 5 milliGRAM(s) Oral every 8 hours  cefepime   IVPB 2000 milliGRAM(s) IV Intermittent every 8 hours  chlorhexidine 2% Cloths 1 Application(s) Topical <User Schedule>  dexAMETHasone  Injectable 6 milliGRAM(s) IV Push daily  dextrose 50% Injectable 50 milliLiter(s) IV Push every 15 minutes  dextrose 50% Injectable 25 milliLiter(s) IV Push every 15 minutes  enoxaparin Injectable 40 milliGRAM(s) SubCutaneous every 12 hours  hydrALAZINE 20 milliGRAM(s) Oral every 8 hours  insulin lispro (ADMELOG) corrective regimen sliding scale   SubCutaneous three times a day before meals  insulin lispro (ADMELOG) corrective regimen sliding scale   SubCutaneous at bedtime  methimazole 10 milliGRAM(s) Oral daily  metoclopramide Injectable 10 milliGRAM(s) IV Push every 8 hours  mirtazapine 7.5 milliGRAM(s) Oral at bedtime  pantoprazole  Injectable 40 milliGRAM(s) IV Push every 12 hours  potassium phosphate / sodium phosphate Tablet (K-PHOS No. 2) 1 Tablet(s) Oral two times a day  sertraline 100 milliGRAM(s) Oral daily  sodium chloride 0.9% lock flush 3 milliLiter(s) IV Push every 8 hours  spironolactone 25 milliGRAM(s) Oral daily  vancomycin    Solution 125 milliGRAM(s) Oral every 12 hours      CAPILLARY BLOOD GLUCOSE    POCT Blood Glucose.: 158 mg/dL (19 Dec 2021 21:25)  POCT Blood Glucose.: 134 mg/dL (19 Dec 2021 16:59)  POCT Blood Glucose.: 256 mg/dL (19 Dec 2021 11:45)    I&O's Summary    19 Dec 2021 07:01  -  20 Dec 2021 07:00  --------------------------------------------------------  IN: 838 mL / OUT: 1560 mL / NET: -722 mL      PHYSICAL EXAM:  Vital Signs Last 24 Hrs  T(C): 36.7 (20 Dec 2021 04:16), Max: 36.9 (20 Dec 2021 02:00)  T(F): 98.1 (20 Dec 2021 04:16), Max: 98.4 (20 Dec 2021 02:00)  HR: 99 (20 Dec 2021 06:25) (96 - 120)  BP: 96/70 (20 Dec 2021 06:25) (90/69 - 104/79)  BP(mean): 79 (20 Dec 2021 06:25) (65 - 88)  RR: 18 (20 Dec 2021 06:25) (18 - 19)  SpO2: 95% (20 Dec 2021 06:25) (95% - 100%)    CONSTITUTIONAL: NAD, well-developed; trach site bandaged c/d/i, clean underneath with clear mucus  RESPIRATORY: Normal respiratory effort; lungs are clear to auscultation bilaterally  CARDIOVASCULAR: Regular rate and rhythm, normal S1 and S2, no murmur/rub/gallop; No lower extremity edema; Peripheral pulses are 2+ bilaterally  ABDOMEN: Nontender to palpation, normoactive bowel sounds, no rebound/guarding; No hepatosplenomegaly  MUSCLOSKELETAL: no clubbing or cyanosis of digits; no joint swelling or tenderness to palpation  PSYCH: A+O to person, place, and time; affect appropriate    LABS:                        10.5   11.87 )-----------( 184      ( 20 Dec 2021 05:40 )             33.8     12-20    137  |  99  |  14  ----------------------------<  103<H>  3.6   |  27  |  0.57    Ca    8.7      20 Dec 2021 05:40  Phos  2.9     12-20  Mg     1.7     12-20    TPro  7.5  /  Alb  2.8<L>  /  TBili  0.3  /  DBili  x   /  AST  16  /  ALT  17  /  AlkPhos  118  12-20        Culture - Blood (collected 18 Dec 2021 08:15)  Source: .Blood Blood  Preliminary Report (19 Dec 2021 09:01):    No growth to date.

## 2021-12-20 NOTE — PROGRESS NOTE ADULT - PROBLEM SELECTOR PLAN 6
- Per previous records - Endocrine consulted for management of hyperthyroidism in the setting of recent amiodarone use and multiple IV contrast studies. 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 Unable to get a thyroid US due to his trach.   - continue methimazole 10mg daily  - f/u repeat FT4

## 2021-12-20 NOTE — PROGRESS NOTE ADULT - SUBJECTIVE AND OBJECTIVE BOX
ID # [id number] Shanika   HPI:  65M PMH ACC/AHA stage D HF due to NICM HM2 LVAD , TV annuloplasty ring 9/12/17 as destination therapy due to severe peripheral artery disease with significant stenosis  SIADH, Depression, CKD-3 with hyperkalemia, past E. coli UTIs, driveline drainage (1/7/21) and COVID-19 (back in April 2020). Recurrent syncope post LVAD s/p ILR, and chronic abdominal pain and was noted to have a prolong hospital course (6/14-11/16). During that time he has multiple infections and now remains on suppressive antibiotics. Underwent SMA stent with Vascular in 10/2021, now tolerating PO diet, perc dawn drain placement and mutiple GI bleeds. Ultimately was trach and discharged to rehab to get stronger. Patient returned from rehab for trach decannulation, which was removed on 12/2.    Now presented from [facility] on 12/13 with AMS, hypotensive, tachycardic found to be COVID (+). Pt is lethargic and mumbling words.     (13 Dec 2021 01:06)    Off note, the patient was admitted between 6/14 and 11/17 then between 12/2 and 12/6 and note is readmitted for the reasons above. As per HF, the patient rescinded his code status and he is now full code. Palliative care was called for GOC.     12/16 The patient was seating on a chair. He denied pain or dyspnea. He was only c/o hiccups which are persistent.    He denied nausea or vomiting. He is tolerating PO.     12/20      PERTINENT PM/SXH:   No pertinent past medical history    CHF (congestive heart failure)    CAD (coronary artery disease)    Depression    Pleural effusion    History of 2019 novel coronavirus disease (COVID-19)    Hemorrhoids    Bleeding hemorrhoids    Peripheral arterial disease    Claudication    BPH with urinary obstruction    ACC/AHA stage D systolic heart failure    Anticoagulation goal of INR 2.0 to 2.5    Falls    Clavicle fracture    CKD (chronic kidney disease), stage III    Iron deficiency anemia    H/O epistaxis    Vertigo    GI bleed      No significant past surgical history    S/P TVR (tricuspid valve repair)    S/P ventricular assist device    S/P endoscopy    H/O tracheostomy      FAMILY HISTORY:  No FH of Heart Failure   ITEMS NOT CHECKED ARE NOT PRESENT    SOCIAL HISTORY:   Significant other/partner[ ]  Children[x ]  Mandaeism/Spirituality:  Substance hx:  [ ]   Tobacco hx:  [ ]   Alcohol hx: [ ]   Home Opioid hx:  [ ] I-Stop Reference No:  Living Situation: [ ]Home  [ ]Long term care  [ ]Rehab [ ]Other    ADVANCE DIRECTIVES:    DNR  MOLST  [ ]  Living Will  [ ]   DECISION MAKER(s):  [x ] Health Care Proxy(s)  [ ] Surrogate(s)  [ ] Guardian           Name(s): Phone Number(s): SisterAlyssa [phone]    BASELINE (I)ADL(s) (prior to admission):  [place]: [ ]Total  [ ] Moderate [x ]Dependent    Allergies    Amiodarone Hydrochloride (Other (Severe))    Intolerances    MEDICATIONS  (STANDING):  aspirin  chewable 81 milliGRAM(s) Oral daily  baclofen 5 milliGRAM(s) Oral every 8 hours  cefepime   IVPB 2000 milliGRAM(s) IV Intermittent every 8 hours  chlorhexidine 2% Cloths 1 Application(s) Topical <User Schedule>  dexAMETHasone  Injectable 6 milliGRAM(s) IV Push daily  dextrose 50% Injectable 50 milliLiter(s) IV Push every 15 minutes  dextrose 50% Injectable 25 milliLiter(s) IV Push every 15 minutes  enoxaparin Injectable 60 milliGRAM(s) SubCutaneous every 12 hours  hydrALAZINE 10 milliGRAM(s) Oral every 8 hours  insulin lispro (ADMELOG) corrective regimen sliding scale   SubCutaneous three times a day before meals  insulin lispro (ADMELOG) corrective regimen sliding scale   SubCutaneous at bedtime  methimazole 10 milliGRAM(s) Oral daily  metoclopramide Injectable 10 milliGRAM(s) IV Push every 8 hours  metoprolol succinate ER 25 milliGRAM(s) Oral daily  mirtazapine 7.5 milliGRAM(s) Oral at bedtime  pantoprazole  Injectable 40 milliGRAM(s) IV Push every 12 hours  potassium phosphate / sodium phosphate Tablet (K-PHOS No. 2) 1 Tablet(s) Oral two times a day  sertraline 100 milliGRAM(s) Oral daily  sodium chloride 0.9% lock flush 3 milliLiter(s) IV Push every 8 hours  spironolactone 25 milliGRAM(s) Oral daily  vancomycin    Solution 125 milliGRAM(s) Oral every 12 hours    MEDICATIONS  (PRN):        PRESENT SYMPTOMS: [ ]Unable to obtain due to poor mentation   Source if other than patient:  [ ]Family   [ ]Team     Pain: [ ]yes [x ]no  QOL impact -   Location -                    Aggravating factors -  Quality -  Radiation -  Timing-  Severity (0-10 scale):  Minimal acceptable level (0-10 scale):     CPOT:    https://www.[facility].org/getattachment/[email or web address]/Critical-Care-Pain-Observation-Tool-(CPOT)      PAIN AD Score:     http://geriatrictoolkit.[facility]/cog/painad.pdf (press ctrl +  left click to view)    Dyspnea:                           [ ]Mild [ ]Moderate [ ]Severe  Anxiety:                             [ ]Mild [ ]Moderate [ ]Severe  Fatigue:                             [ ]Mild [ ]Moderate [ ]Severe  Nausea:                             [ ]Mild [ ]Moderate [ ]Severe  Loss of appetite:              [ ]Mild [ ]Moderate [ ]Severe  Constipation:                    [ ]Mild [ ]Moderate [ ]Severe    Other Symptoms:  [x ]All other review of systems negative but hiccups     Palliative Performance Status Version 2:     40   %    http://npcrc.org/files/news/palliative_performance_scale_ppsv2.pdf  PHYSICAL EXAM:  Vital Signs Last 24 Hrs  T(C): 36.9 (20 Dec 2021 16:01), Max: 36.9 (20 Dec 2021 02:00)  T(F): 98.4 (20 Dec 2021 16:01), Max: 98.4 (20 Dec 2021 02:00)  HR: 94 (20 Dec 2021 16:01) (94 - 120)  BP: 85/62 (20 Dec 2021 16:01) (81/62 - 104/79)  BP(mean): 70 (20 Dec 2021 16:01) (69 - 88)  RR: 18 (20 Dec 2021 16:01) (17 - 19)  SpO2: 99% (20 Dec 2021 16:01) (95% - 100%)    GENERAL:  [ x]Alert  [x ]Oriented x 3  [ ]Lethargic  [x ]Cachexia  [ ]Unarousable  [x ]Verbal  [ ]Non-Verbal  Behavioral:   [ ] Anxiety  [ ] Delirium [ ] Agitation [ ] Other  HEENT:  [ ]Normal   [x ]Dry mouth   [ ]ET Tube/Trach  [ ]Oral lesions  PULMONARY:   [ ]Clear [ ]Tachypnea  [ ]Audible excessive secretions   [ ]Rhonchi        [ ]Right [ ]Left [ ]Bilateral  [ ]Crackles        [ ]Right [ ]Left [ ]Bilateral  [ ]Wheezing     [ ]Right [ ]Left [ ]Bilateral  [x ]Diminished breath sounds [ ]right [ ]left [ x]bilateral  CARDIOVASCULAR:    [x ]Regular [ ]Irregular [ ]Tachy  [ ]Jose [ ]Murmur [x ]Other: LVAD   GASTROINTESTINAL:  [x ]Soft  [ ]Distended   [x ]+BS  [x ]Non tender [ ]Tender  [ ]PEG [ ]OGT/ NGT  Last BM:   GENITOURINARY:  [x ]Normal [ ] Incontinent   [ ]Oliguria/Anuria   [ ]Landrum  MUSCULOSKELETAL:   [ ]Normal   [x ]Weakness  [ ]Bed/Wheelchair bound [ ]Edema  NEUROLOGIC:   [x ]No focal deficits  [ ]Cognitive impairment  [ ]Dysphagia [ ]Dysarthria [ ]Paresis [ ]Other   SKIN:   [ ]Normal    [ ]Rash  [ ]Pressure ulcer(s)       Present on admission [ ]y [ ]n    CRITICAL CARE:  [ ] Shock Present  [ ]Septic [ ]Cardiogenic [ ]Neurologic [ ]Hypovolemic  [ ]  Vasopressors [ ]  Inotropes   [ ]Respiratory failure present [ ]Mechanical ventilation [ ]Non-invasive ventilatory support [ ]High flow    [ ]Acute  [ ]Chronic [ ]Hypoxic  [ ]Hypercarbic [ ]Other  [ ]Other organ failure     LABS:                                              10.5   11.87 )-----------( 184      ( 20 Dec 2021 05:40 )             33.8   12-20    137  |  99  |  14  ----------------------------<  103<H>  3.6   |  27  |  0.57    Ca    8.7      20 Dec 2021 05:40  Phos  2.9     12-20  Mg     1.7     12-20    TPro  7.5  /  Alb  2.8<L>  /  TBili  0.3  /  DBili  x   /  AST  16  /  ALT  17  /  AlkPhos  118  12-20      RADIOLOGY & ADDITIONAL STUDIES: Reviewed.     PROTEIN CALORIE MALNUTRITION PRESENT: [ ]mild [ ]moderate [ ]severe [ ]underweight [ ]morbid obesity  https://www.andeal.org/vault/2440/web/files/ONC/Table_Clinical%20Characteristics%20to%20Document%20Malnutrition-White%20JV%20et%20al%202012.pdf    Height (cm): 182.9 (12-13-21 @ 04:30), 165.1 (12-02-21 @ 13:55), 177.8 (10-18-21 @ 15:55)  Weight (kg): 55.7 (12-13-21 @ 02:12), 53.6 (12-02-21 @ 13:55), 61.6 (10-18-21 @ 15:55)  BMI (kg/m2): 16.7 (12-13-21 @ 04:30), 20.4 (12-13-21 @ 02:12), 19.7 (12-02-21 @ 13:55)    [ ]PPSV2 < or = to 30% [ ]significant weight loss  [ ]poor nutritional intake  [ ]anasarca      [ ]Artificial Nutrition      REFERRALS:   [ ]Chaplaincy  [ ]Hospice  [ ]Child Life  [ ]Social Work  [ ]Case management [ ]Holistic Therapy     Goals of Care Document:   ID # [id number] Shanika   HPI:  65M PMH ACC/AHA stage D HF due to NICM HM2 LVAD , TV annuloplasty ring 9/12/17 as destination therapy due to severe peripheral artery disease with significant stenosis  SIADH, Depression, CKD-3 with hyperkalemia, past E. coli UTIs, driveline drainage (1/7/21) and COVID-19 (back in April 2020). Recurrent syncope post LVAD s/p ILR, and chronic abdominal pain and was noted to have a prolong hospital course (6/14-11/16). During that time he has multiple infections and now remains on suppressive antibiotics. Underwent SMA stent with Vascular in 10/2021, now tolerating PO diet, perc dawn drain placement and mutiple GI bleeds. Ultimately was trach and discharged to rehab to get stronger. Patient returned from rehab for trach decannulation, which was removed on 12/2.    Now presented from [facility] on 12/13 with AMS, hypotensive, tachycardic found to be COVID (+). Pt is lethargic and mumbling words.     (13 Dec 2021 01:06)    Off note, the patient was admitted between 6/14 and 11/17 then between 12/2 and 12/6 and note is readmitted for the reasons above. As per HF, the patient rescinded his code status and he is now full code. Palliative care was called for GOC.     12/16 The patient was seating on a chair. He denied pain or dyspnea. He was only c/o hiccups which are persistent.    He denied nausea or vomiting. He is tolerating PO.     12/20 He indicated hiccups has significantly improved. He is also feeling better and looking for returning to [facility] [place].       PERTINENT PM/SXH:   No pertinent past medical history    CHF (congestive heart failure)    CAD (coronary artery disease)    Depression    Pleural effusion    History of 2019 novel coronavirus disease (COVID-19)    Hemorrhoids    Bleeding hemorrhoids    Peripheral arterial disease    Claudication    BPH with urinary obstruction    ACC/AHA stage D systolic heart failure    Anticoagulation goal of INR 2.0 to 2.5    Falls    Clavicle fracture    CKD (chronic kidney disease), stage III    Iron deficiency anemia    H/O epistaxis    Vertigo    GI bleed      No significant past surgical history    S/P TVR (tricuspid valve repair)    S/P ventricular assist device    S/P endoscopy    H/O tracheostomy      FAMILY HISTORY:  No FH of Heart Failure   ITEMS NOT CHECKED ARE NOT PRESENT    SOCIAL HISTORY:   Significant other/partner[ ]  Children[x ]  Holiness/Spirituality:  Substance hx:  [ ]   Tobacco hx:  [ ]   Alcohol hx: [ ]   Home Opioid hx:  [ ] I-Stop Reference No:  Living Situation: [ ]Home  [ ]Long term care  [ ]Rehab [ ]Other    ADVANCE DIRECTIVES:    DNR  MOLST  [ ]  Living Will  [ ]   DECISION MAKER(s):  [x ] Health Care Proxy(s)  [ ] Surrogate(s)  [ ] Guardian           Name(s): Phone Number(s): SisterAlyssa [phone]    BASELINE (I)ADL(s) (prior to admission):  [place]: [ ]Total  [ ] Moderate [x ]Dependent    Allergies    Amiodarone Hydrochloride (Other (Severe))    Intolerances    MEDICATIONS  (STANDING):  aspirin  chewable 81 milliGRAM(s) Oral daily  baclofen 5 milliGRAM(s) Oral every 8 hours  cefepime   IVPB 2000 milliGRAM(s) IV Intermittent every 8 hours  chlorhexidine 2% Cloths 1 Application(s) Topical <User Schedule>  dexAMETHasone  Injectable 6 milliGRAM(s) IV Push daily  dextrose 50% Injectable 50 milliLiter(s) IV Push every 15 minutes  dextrose 50% Injectable 25 milliLiter(s) IV Push every 15 minutes  enoxaparin Injectable 60 milliGRAM(s) SubCutaneous every 12 hours  hydrALAZINE 10 milliGRAM(s) Oral every 8 hours  insulin lispro (ADMELOG) corrective regimen sliding scale   SubCutaneous three times a day before meals  insulin lispro (ADMELOG) corrective regimen sliding scale   SubCutaneous at bedtime  methimazole 10 milliGRAM(s) Oral daily  metoclopramide Injectable 10 milliGRAM(s) IV Push every 8 hours  metoprolol succinate ER 25 milliGRAM(s) Oral daily  mirtazapine 7.5 milliGRAM(s) Oral at bedtime  pantoprazole  Injectable 40 milliGRAM(s) IV Push every 12 hours  potassium phosphate / sodium phosphate Tablet (K-PHOS No. 2) 1 Tablet(s) Oral two times a day  sertraline 100 milliGRAM(s) Oral daily  sodium chloride 0.9% lock flush 3 milliLiter(s) IV Push every 8 hours  spironolactone 25 milliGRAM(s) Oral daily  vancomycin    Solution 125 milliGRAM(s) Oral every 12 hours    MEDICATIONS  (PRN):        PRESENT SYMPTOMS: [ ]Unable to obtain due to poor mentation   Source if other than patient:  [ ]Family   [ ]Team     Pain: [ ]yes [x ]no  QOL impact -   Location -                    Aggravating factors -  Quality -  Radiation -  Timing-  Severity (0-10 scale):  Minimal acceptable level (0-10 scale):     CPOT:    https://www.[facility].org/getattachment/[email or web address]/Critical-Care-Pain-Observation-Tool-(CPOT)      PAIN AD Score:     http://geriatrictoolkit.[facility]/cog/painad.pdf (press ctrl +  left click to view)    Dyspnea:                           [ ]Mild [ ]Moderate [ ]Severe  Anxiety:                             [ ]Mild [ ]Moderate [ ]Severe  Fatigue:                             [ ]Mild [ ]Moderate [ ]Severe  Nausea:                             [ ]Mild [ ]Moderate [ ]Severe  Loss of appetite:              [ ]Mild [ ]Moderate [ ]Severe  Constipation:                    [ ]Mild [ ]Moderate [ ]Severe    Other Symptoms:  [x ]All other review of systems negative but hiccups     Palliative Performance Status Version 2:     40   %    http://npcrc.org/files/news/palliative_performance_scale_ppsv2.pdf  PHYSICAL EXAM:  Vital Signs Last 24 Hrs  T(C): 36.9 (20 Dec 2021 16:01), Max: 36.9 (20 Dec 2021 02:00)  T(F): 98.4 (20 Dec 2021 16:01), Max: 98.4 (20 Dec 2021 02:00)  HR: 94 (20 Dec 2021 16:01) (94 - 120)  BP: 85/62 (20 Dec 2021 16:01) (81/62 - 104/79)  BP(mean): 70 (20 Dec 2021 16:01) (69 - 88)  RR: 18 (20 Dec 2021 16:01) (17 - 19)  SpO2: 99% (20 Dec 2021 16:01) (95% - 100%)    GENERAL:  [ x]Alert  [x ]Oriented x 3  [ ]Lethargic  [x ]Cachexia  [ ]Unarousable  [x ]Verbal  [ ]Non-Verbal  Behavioral:   [ ] Anxiety  [ ] Delirium [ ] Agitation [ ] Other  HEENT:  [ ]Normal   [x ]Dry mouth   [ ]ET Tube/Trach  [ ]Oral lesions  PULMONARY:   [ ]Clear [ ]Tachypnea  [ ]Audible excessive secretions   [ ]Rhonchi        [ ]Right [ ]Left [ ]Bilateral  [ ]Crackles        [ ]Right [ ]Left [ ]Bilateral  [ ]Wheezing     [ ]Right [ ]Left [ ]Bilateral  [x ]Diminished breath sounds [ ]right [ ]left [ x]bilateral  CARDIOVASCULAR:    [x ]Regular [ ]Irregular [ ]Tachy  [ ]Jose [ ]Murmur [x ]Other: LVAD   GASTROINTESTINAL:  [x ]Soft  [ ]Distended   [x ]+BS  [x ]Non tender [ ]Tender  [ ]PEG [ ]OGT/ NGT  Last BM: 12/19  GENITOURINARY:  [x ]Normal [ ] Incontinent   [ ]Oliguria/Anuria   [ ]Landrum  MUSCULOSKELETAL:   [ ]Normal   [x ]Weakness  [ ]Bed/Wheelchair bound [ ]Edema  NEUROLOGIC:   [x ]No focal deficits  [ ]Cognitive impairment  [ ]Dysphagia [ ]Dysarthria [ ]Paresis [ ]Other   SKIN:   [x ]Normal    [ ]Rash  [ ]Pressure ulcer(s)       Present on admission [ ]y [ ]n    CRITICAL CARE:  [ ] Shock Present  [ ]Septic [ ]Cardiogenic [ ]Neurologic [ ]Hypovolemic  [ ]  Vasopressors [ ]  Inotropes   [ ]Respiratory failure present [ ]Mechanical ventilation [ ]Non-invasive ventilatory support [ ]High flow    [ ]Acute  [ ]Chronic [ ]Hypoxic  [ ]Hypercarbic [ ]Other  [ ]Other organ failure     LABS:                                              10.5   11.87 )-----------( 184      ( 20 Dec 2021 05:40 )             33.8   12-20    137  |  99  |  14  ----------------------------<  103<H>  3.6   |  27  |  0.57    Ca    8.7      20 Dec 2021 05:40  Phos  2.9     12-20  Mg     1.7     12-20    TPro  7.5  /  Alb  2.8<L>  /  TBili  0.3  /  DBili  x   /  AST  16  /  ALT  17  /  AlkPhos  118  12-20      RADIOLOGY & ADDITIONAL STUDIES: Reviewed.     PROTEIN CALORIE MALNUTRITION PRESENT: [ ]mild [ ]moderate [ ]severe [ ]underweight [ ]morbid obesity  https://www.andeal.org/vault/0580/web/files/ONC/Table_Clinical%20Characteristics%20to%20Document%20Malnutrition-White%20JV%20et%20al%202012.pdf    Height (cm): 182.9 (12-13-21 @ 04:30), 165.1 (12-02-21 @ 13:55), 177.8 (10-18-21 @ 15:55)  Weight (kg): 55.7 (12-13-21 @ 02:12), 53.6 (12-02-21 @ 13:55), 61.6 (10-18-21 @ 15:55)  BMI (kg/m2): 16.7 (12-13-21 @ 04:30), 20.4 (12-13-21 @ 02:12), 19.7 (12-02-21 @ 13:55)    [ ]PPSV2 < or = to 30% [ ]significant weight loss  [ ]poor nutritional intake  [ ]anasarca      [ ]Artificial Nutrition      REFERRALS:   [ ]Chaplaincy  [ ]Hospice  [ ]Child Life  [ ]Social Work  [ ]Case management [ ]Holistic Therapy     Goals of Care Document:

## 2021-12-20 NOTE — PROGRESS NOTE ADULT - PROBLEM SELECTOR PLAN 1
- Continue with speed of 9200  - Daily LDH  - Continue ASA 81 mg daily - Continue with speed of 9200  - s/p controller change on 12/16   - Daily LDH  - Continue ASA 81 mg daily  - He is off coumadin due to persistent recurring GI bleeding  - PT consult to help determine appropriate disposition

## 2021-12-20 NOTE — PROGRESS NOTE ADULT - SUBJECTIVE AND OBJECTIVE BOX
Subjective:  - several runs of VT this afternoon, sustained for up to ~110 beats. Currently in ST with HR low 100s, notes fatigue but otherwise asymptomatic. Denies SOB, lightheadedness, palpitations, CP, abdominal discomfort  - has not been OOB  - reports eating well    Medications:  aspirin  chewable 81 milliGRAM(s) Oral daily  baclofen 5 milliGRAM(s) Oral every 8 hours  cefepime   IVPB 2000 milliGRAM(s) IV Intermittent every 8 hours  chlorhexidine 2% Cloths 1 Application(s) Topical <User Schedule>  dexAMETHasone  Injectable 6 milliGRAM(s) IV Push daily  dextrose 50% Injectable 50 milliLiter(s) IV Push every 15 minutes  dextrose 50% Injectable 25 milliLiter(s) IV Push every 15 minutes  enoxaparin Injectable 60 milliGRAM(s) SubCutaneous every 12 hours  hydrALAZINE 10 milliGRAM(s) Oral every 8 hours  insulin lispro (ADMELOG) corrective regimen sliding scale   SubCutaneous three times a day before meals  insulin lispro (ADMELOG) corrective regimen sliding scale   SubCutaneous at bedtime  magnesium sulfate  IVPB 2 Gram(s) IV Intermittent once  methimazole 10 milliGRAM(s) Oral daily  metoclopramide Injectable 10 milliGRAM(s) IV Push every 8 hours  metoprolol succinate ER 25 milliGRAM(s) Oral daily  mirtazapine 7.5 milliGRAM(s) Oral at bedtime  pantoprazole  Injectable 40 milliGRAM(s) IV Push every 12 hours  potassium phosphate / sodium phosphate Tablet (K-PHOS No. 2) 1 Tablet(s) Oral two times a day  sertraline 100 milliGRAM(s) Oral daily  sodium chloride 0.9% lock flush 3 milliLiter(s) IV Push every 8 hours  spironolactone 25 milliGRAM(s) Oral daily  vancomycin    Solution 125 milliGRAM(s) Oral every 12 hours      Physical Exam:    Vitals:  Vital Signs Last 24 Hours  T(C): 36.9 (12-20-21 @ 16:01), Max: 36.9 (12-20-21 @ 02:00)  HR: 94 (12-20-21 @ 16:01) (94 - 120)  BP: 85/62 (12-20-21 @ 16:01) (81/62 - 104/79), MAPS 69-88  RR: 18 (12-20-21 @ 16:01) (17 - 19)  SpO2: 99% (12-20-21 @ 16:01) (95% - 100%)    LVAD Interrogation:  Heart Mate 2  Speed 9200  Flow 4.4  Power 5.1  PI 5.1  No events    I&O's Summary    19 Dec 2021 07:01  -  20 Dec 2021 07:00  --------------------------------------------------------  IN: 838 mL / OUT: 1560 mL / NET: -722 mL    20 Dec 2021 07:01  -  20 Dec 2021 16:25  --------------------------------------------------------  IN: 420 mL / OUT: 300 mL / NET: 120 mL    Tele:  ST 80-100s, VT as above    General: Frail. No distress. Comfortable.  HEENT: EOM intact. prior trach site with gauze dressing loose  Neck: Neck supple. JVP not elevated. No masses  Chest: Clear to auscultation bilaterally  CV: LVAD hum. No LE edema  Abdomen: Soft, non-distended, non-tender  Skin: No rashes or skin breakdown. DLES dressing and R UQ dawn drain dressing C/D/I  Neurology: Alert and oriented times three. Sensation intact  Psych: Flat affect.    Labs:                        10.5   11.87 )-----------( 184      ( 20 Dec 2021 05:40 )             33.8     12-20    137  |  99  |  14  ----------------------------<  103<H>  3.6   |  27  |  0.57    Ca    8.7      20 Dec 2021 05:40  Phos  2.9     12-20  Mg     1.7     12-20    TPro  7.5  /  Alb  2.8<L>  /  TBili  0.3  /  DBili  x   /  AST  16  /  ALT  17  /  AlkPhos  118  12-20    Lactate Dehydrogenase, Serum: 227 U/L (12-18 @ 06:14)

## 2021-12-20 NOTE — PROGRESS NOTE ADULT - PROBLEM SELECTOR PLAN 3
Will sing off.         Francisco Burgos MD   Geriatrics and Palliative Care (GAP) Consult Service    of Geriatric and Palliative Medicine  [facility]      Please page the following number for clinical matters between the hours of 9 am and 5 pm from Monday through Friday : [phone]    After 5pm and on weekends, please see the contact information below:    In the event of newly developing, evolving, or worsening symptoms, please contact the Palliative Medicine team via pager (if the patient is at [facility] #[phone] or if the patient is at [facility] #[phone]) The Geriatric and Palliative Medicine service has coverage 24 hours a day/ 7 days a week to provide medical recommendations regarding symptom management needs via telephone

## 2021-12-20 NOTE — PROGRESS NOTE ADULT - ASSESSMENT
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 coli UTIs, driveline drainage (1/7/21) and COVID-19 (back in April 2020). Recurrent syncope post LVAD s/p ILR, and chronic abdominal pain and was noted to have a prolong hospital course (6/14-11/16). During that time he has multiple infections and now remains on suppressive antibiotics. Underwent SMA stent with Vascular in 10/2021, now tolerating PO diet, perc dawn drain placement and mutiple GI bleeds. Ultimately was trach and discharged to rehab to get stronger. Patient returned from rehab for trach decannulation, which was removed on 12/2. Now presented from [facility] on 12/13 with AMS, hypotensive, tachycardic found to be COVID (+). Pt was lethargic and mumbling words. However, his mental status improved. Palliative care was called for ACP.     Off note, the patient was admitted between 6/14 and 11/17 then between 12/2 and 12/6 and note is readmitted for the reasons above. As per HF, the patient rescinded his code status and he is now full code. Palliative care was called for GOC.

## 2021-12-20 NOTE — SWALLOW BEDSIDE ASSESSMENT ADULT - SWALLOW EVAL: DIAGNOSIS
Patient will mutliple cardiac and GI comorbidities. S/p trach decannulation on 12/2. Admitted now with recurrent COVID infection. Patient known to this service with FEES from 11/2021. Patient presents with an overtly functional oropharyngeal swallow sequence for a regular diet with thin liquids. Patient with multiple cardiac (+LVAD) and GI comorbidities. S/p trach decannulation on 12/2. Admitted now with recurrent COVID infection. Patient known to this service with FEES from 11/2021. Patient presents with an overtly functional oropharyngeal swallow sequence for a regular diet with thin liquids.

## 2021-12-20 NOTE — PROGRESS NOTE ADULT - PROBLEM SELECTOR PLAN 2
- Dr. Prater, ID following  - COVID PCR positive 12/13  - s/p monoclonal antibodies  - Continue course of remdesivir and dexamethasone  - Serratia bacteremia- continue IV cefepime per ID  - will discuss with ID regarding plan for ongoing antibiotics as patient approaches discharge  - gallbladder US unremarkable

## 2021-12-20 NOTE — PROGRESS NOTE ADULT - PROBLEM SELECTOR PLAN 3
- hydralazine 20 mg TID   - spironolactone 25 mg daily  - Daily PT  - Encourage oral intake and nutrition

## 2021-12-20 NOTE — PROGRESS NOTE ADULT - PROBLEM SELECTOR PLAN 1
- COVID PCR + 12/13. Recent CXR clear, respiratory status stable on room air   - s/p monoclonal antibodies Regeneron   - s/p 5 day course of Remdesivir/Dexamethasone

## 2021-12-20 NOTE — PROGRESS NOTE ADULT - ASSESSMENT
64 yo man with a history of VAD placement history of COVID infection in April 2020, history of a prolonged hospitalization in 2021 with recurrent sepsis, pneumonia, intubated/trach x2 cycles , chronic gall bladder disease s/p perc dawn, SMA ischemia requiring a stent placement, cachexia who was discharged to rehab but is presented from [facility] on 12/13 with AMS, hypotensive, tachycardic found to be COVID (+). Pt lethargic and mumbling words required ICU admission and pressor support now titrated off and downgraded to the floor.

## 2021-12-20 NOTE — PROGRESS NOTE ADULT - PROBLEM SELECTOR PLAN 4
- Encourage PO fluid intake  - decrease hydralazine to 10mg TID  - start Toprol XL 25mg daily  - continue spironolactone 25 mg daily  - Daily PT  - Encourage oral intake and nutrition

## 2021-12-20 NOTE — PROGRESS NOTE ADULT - PROBLEM SELECTOR PLAN 2
- History of recurrent pneumonia and bacteremia  - With stenotrophomonas in prior sputum cultures and enterobacter and serratia in blood in October 2021  - Blood cultures 12/13 + Serratia  - RUQ sonogram negative  - Cefepime 2g IV q8 (12/13- )  - repeat bcx NGTD

## 2021-12-20 NOTE — PROGRESS NOTE ADULT - ATTENDING COMMENTS
He developed runs of VT today, will restart beta blocker and ensure his K/Mg remain repleted. PT consult to see if home discharge is safe or if he needs rehab again. Will discuss duration of IV antibiotics with ID .

## 2021-12-20 NOTE — PROGRESS NOTE ADULT - SUBJECTIVE AND OBJECTIVE BOX
INFECTIOUS DISEASES FOLLOW UP-- Anitra Prater  [phone]    This is a follow up note for this  66yMale with  Sepsis        ROS:  CONSTITUTIONAL:  No fever, good appetite  CARDIOVASCULAR:  No chest pain or palpitations  RESPIRATORY:  No dyspnea  GASTROINTESTINAL:  No nausea, vomiting, diarrhea, or abdominal pain  GENITOURINARY:  No dysuria  NEUROLOGIC:  No headache,     Allergies    Amiodarone Hydrochloride (Other (Severe))    Intolerances        ANTIBIOTICS/RELEVANT:  antimicrobials  cefepime   IVPB 2000 milliGRAM(s) IV Intermittent every 8 hours  vancomycin    Solution 125 milliGRAM(s) Oral every 12 hours    immunologic:    OTHER:  aspirin  chewable 81 milliGRAM(s) Oral daily  baclofen 5 milliGRAM(s) Oral every 8 hours  chlorhexidine 2% Cloths 1 Application(s) Topical <User Schedule>  dexAMETHasone  Injectable 6 milliGRAM(s) IV Push daily  dextrose 50% Injectable 50 milliLiter(s) IV Push every 15 minutes  dextrose 50% Injectable 25 milliLiter(s) IV Push every 15 minutes  enoxaparin Injectable 60 milliGRAM(s) SubCutaneous every 12 hours  hydrALAZINE 10 milliGRAM(s) Oral every 8 hours  insulin lispro (ADMELOG) corrective regimen sliding scale   SubCutaneous three times a day before meals  insulin lispro (ADMELOG) corrective regimen sliding scale   SubCutaneous at bedtime  methimazole 10 milliGRAM(s) Oral daily  metoclopramide Injectable 10 milliGRAM(s) IV Push every 8 hours  metoprolol succinate ER 25 milliGRAM(s) Oral daily  mirtazapine 7.5 milliGRAM(s) Oral at bedtime  pantoprazole  Injectable 40 milliGRAM(s) IV Push every 12 hours  potassium phosphate / sodium phosphate Tablet (K-PHOS No. 2) 1 Tablet(s) Oral two times a day  sertraline 100 milliGRAM(s) Oral daily  sodium chloride 0.9% lock flush 3 milliLiter(s) IV Push every 8 hours  spironolactone 25 milliGRAM(s) Oral daily      Objective:  Vital Signs Last 24 Hrs  T(C): 36.9 (20 Dec 2021 16:01), Max: 36.9 (20 Dec 2021 02:00)  T(F): 98.4 (20 Dec 2021 16:01), Max: 98.4 (20 Dec 2021 02:00)  HR: 94 (20 Dec 2021 16:01) (94 - 120)  BP: 85/62 (20 Dec 2021 16:01) (81/62 - 104/79)  BP(mean): 70 (20 Dec 2021 16:01) (69 - 88)  RR: 18 (20 Dec 2021 16:01) (17 - 19)  SpO2: 99% (20 Dec 2021 16:01) (95% - 100%)    PHYSICAL EXAM:  Constitutional:no acute distress  Eyes:ALONSO, EOMI  Ear/Nose/Throat: no oral lesions, 	  Respiratory: clear BL  Cardiovascular: S1S2  Gastrointestinal:soft, (+) BS, no tenderness  Extremities:no e/e/c  No Lymphadenopathy  IV sites not inflammed.    LABS:                        10.5   11.87 )-----------( 184      ( 20 Dec 2021 05:40 )             33.8     12-20    137  |  99  |  14  ----------------------------<  103<H>  3.6   |  27  |  0.57    Ca    8.7      20 Dec 2021 05:40  Phos  2.9     12-20  Mg     1.7     12-20    TPro  7.5  /  Alb  2.8<L>  /  TBili  0.3  /  DBili  x   /  AST  16  /  ALT  17  /  AlkPhos  118  12-20          MICROBIOLOGY:            RECENT CULTURES:  12-18 @ 08:15  .Blood Blood  --  --  --    No growth to date.  --      RADIOLOGY & ADDITIONAL STUDIES:

## 2021-12-21 NOTE — PROGRESS NOTE ADULT - SUBJECTIVE AND OBJECTIVE BOX
Subjective:  - was able to walk with assistance of nurse to the bathroom  - denies SOB, lightheadedness, abdominal discomfort  - reports eating and drinking well    Medications:  aspirin  chewable 81 milliGRAM(s) Oral daily  baclofen 5 milliGRAM(s) Oral every 8 hours  cefepime   IVPB 2000 milliGRAM(s) IV Intermittent every 8 hours  chlorhexidine 2% Cloths 1 Application(s) Topical <User Schedule>  dexAMETHasone  Injectable 6 milliGRAM(s) IV Push daily  dextrose 50% Injectable 50 milliLiter(s) IV Push every 15 minutes  dextrose 50% Injectable 25 milliLiter(s) IV Push every 15 minutes  enoxaparin Injectable 60 milliGRAM(s) SubCutaneous every 12 hours  hydrALAZINE 10 milliGRAM(s) Oral every 8 hours  insulin lispro (ADMELOG) corrective regimen sliding scale   SubCutaneous three times a day before meals  insulin lispro (ADMELOG) corrective regimen sliding scale   SubCutaneous at bedtime  methimazole 10 milliGRAM(s) Oral daily  metoclopramide Injectable 10 milliGRAM(s) IV Push every 8 hours  metoprolol succinate ER 25 milliGRAM(s) Oral daily  mirtazapine 7.5 milliGRAM(s) Oral at bedtime  pantoprazole  Injectable 40 milliGRAM(s) IV Push every 12 hours  potassium phosphate / sodium phosphate Tablet (K-PHOS No. 2) 1 Tablet(s) Oral two times a day  sertraline 100 milliGRAM(s) Oral daily  sodium chloride 0.9% lock flush 3 milliLiter(s) IV Push every 8 hours  spironolactone 25 milliGRAM(s) Oral daily  vancomycin    Solution 125 milliGRAM(s) Oral every 12 hours      Physical Exam:    Vitals:  Vital Signs Last 24 Hours  T(C): 36.8 (12-21-21 @ 10:53), Max: 37 (12-21-21 @ 00:30)  HR: 99 (12-21-21 @ 13:40) (94 - 115)  BP: 93/70 (12-21-21 @ 13:40) (77/56 - 107/62)  RR: 18 (12-21-21 @ 13:40) (16 - 18)  SpO2: 98% (12-21-21 @ 13:40) (98% - 100%)    LVAD Interrogation:  Heart Mate 2  Speed 9200  Flow 5.6  Power 5.7  PI 5.6  No events      I&O's Summary    20 Dec 2021 07:01  -  21 Dec 2021 07:00  --------------------------------------------------------  IN: 420 mL / OUT: 1200 mL / NET: -780 mL    21 Dec 2021 07:01  -  21 Dec 2021 15:10  --------------------------------------------------------  IN: 780 mL / OUT: 200 mL / NET: 580 mL    Tele: ST, no further WCT since yesterday evening    General: Frail. No distress. Comfortable.  HEENT: EOM intact. prior trach site with gauze dressing loose  Neck: Neck supple. JVP not elevated. No masses  Chest: Clear to auscultation bilaterally  CV: LVAD hum. No LE edema  Abdomen: Soft, non-distended, non-tender  Skin: No rashes or skin breakdown. DLES dressing and R UQ dawn drain dressing C/D/I  Neurology: Alert and oriented times three. Sensation intact  Psych: Flat affect.    Labs:                        9.7    11.52 )-----------( 198      ( 21 Dec 2021 05:53 )             30.5     12-21    131<L>  |  97  |  16  ----------------------------<  107<H>  3.9   |  25  |  0.60    Ca    8.4      21 Dec 2021 05:51  Phos  2.5     12-21  Mg     1.8     12-21    TPro  7.1  /  Alb  2.7<L>  /  TBili  0.3  /  DBili  x   /  AST  15  /  ALT  15  /  AlkPhos  119  12-21    Lactate Dehydrogenase, Serum: 216 U/L (12-21 @ 05:51)

## 2021-12-21 NOTE — PROGRESS NOTE ADULT - PROBLEM SELECTOR PLAN 5
- history of thyrotoxicosis with amio, on methimazole  - continue Toprol XL 25mg daily  - Electrolyte repletion to maintain K 4-4.5 and Mg 2-2.5 - history of thyrotoxicosis with amio, on methimazole  - continue Toprol XL 25mg daily, no further VT since resuming beta blocker   - Electrolyte repletion to maintain K 4-4.5 and Mg 2-2.5

## 2021-12-21 NOTE — PROGRESS NOTE ADULT - PROBLEM SELECTOR PLAN 2
- Dr. Prater, ID following  - COVID PCR positive 12/13  - s/p monoclonal antibodies and remdesivir  - Continue dexamethasone  - Serratia bacteremia- continue IV cefepime per ID  - plan for 2 weeks of oral quinalones following treatment for bacteremia per ID  - gallbladder US unremarkable

## 2021-12-21 NOTE — PROGRESS NOTE ADULT - PROBLEM SELECTOR PLAN 1
- Continue with speed of 9200  - s/p controller change on 12/16   - Daily LDH  - Continue ASA 81 mg daily  - He is off coumadin due to persistent recurring GI bleeding  - PT consult to help determine appropriate disposition - Continue with speed of 9200  - s/p controller change on 12/16   - Daily LDH  - Continue ASA 81 mg daily  - He is off coumadin due to persistent recurring GI bleeding

## 2021-12-21 NOTE — PROGRESS NOTE ADULT - ATTENDING COMMENTS
66 yo man with a history of VAD placement history of COVID infection in April 2020, history of a prolonged hospitalization in 2021 with recurrent sepsis, pneumonia, intubated/trach x2 cycles , chronic gall bladder disease s/p perc dawn, SMA ischemia requiring a stent placement, cachexia who was discharged to rehab but is presented from [facility] on 12/13 with AMS, hypotensive, tachycardic found to be COVID (+). Pt is lethargic and mumbling words required ICU admission and pressor support now titrated off and downgraded to the floor.     Serratia Bacteremia, source unclear, LVAD not compatible with MRI and GI feels unlikely to be a GI source  On Cefepime since 12/13  Leukocytosis stable, may be reactive due to dexamethasone - repeat blood culture 12/18 NGTD  COVID19 s/p monoclonal ab, Remdesivir and Dexamethasone currently on room air - will discuss vaccination prior to discharge  Intractable Hiccups - on Baclofen, doing well  Amiodarone Toxicity - on Methimazole  Full code - palliative following regarding GOC  C diff - on PO Vancomycin  Rest of plan as above  D/w team in detail

## 2021-12-21 NOTE — PROGRESS NOTE ADULT - PROBLEM SELECTOR PLAN 6
- Per previous records - Endocrine consulted for management of hyperthyroidism in the setting of recent amiodarone use and multiple IV contrast studies. 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 Unable to get a thyroid US due to his trach.   - continue methimazole 10mg QD  - FT4 and TSH wnl

## 2021-12-21 NOTE — PROGRESS NOTE ADULT - PROBLEM SELECTOR PLAN 2
- History of recurrent pneumonia and bacteremia  - With stenotrophomonas in prior sputum cultures and enterobacter and serratia in blood in October 2021  - Blood cultures 12/13 + Serratia  - RUQ sonogram negative  - Cefepime 2g IV q8 (12/13- )  - repeat bcx NGTD  - as per ID, will need suppressive oral quinolone post treatment of bacteremia x2weeks

## 2021-12-21 NOTE — PROGRESS NOTE ADULT - SUBJECTIVE AND OBJECTIVE BOX
INFECTIOUS DISEASES FOLLOW UP-- Anitra Prater  [phone]    This is a follow up note for this  66yMale with  Sepsis        ROS:  CONSTITUTIONAL:  No new complaints    Allergies    Amiodarone Hydrochloride (Other (Severe))    Intolerances        ANTIBIOTICS/RELEVANT:  antimicrobials  cefepime   IVPB 2000 milliGRAM(s) IV Intermittent every 8 hours  vancomycin    Solution 125 milliGRAM(s) Oral every 12 hours    immunologic:    OTHER:  aspirin  chewable 81 milliGRAM(s) Oral daily  baclofen 5 milliGRAM(s) Oral every 8 hours  chlorhexidine 2% Cloths 1 Application(s) Topical <User Schedule>  dexAMETHasone  Injectable 6 milliGRAM(s) IV Push daily  dextrose 50% Injectable 50 milliLiter(s) IV Push every 15 minutes  dextrose 50% Injectable 25 milliLiter(s) IV Push every 15 minutes  enoxaparin Injectable 60 milliGRAM(s) SubCutaneous every 12 hours  insulin lispro (ADMELOG) corrective regimen sliding scale   SubCutaneous three times a day before meals  insulin lispro (ADMELOG) corrective regimen sliding scale   SubCutaneous at bedtime  methimazole 10 milliGRAM(s) Oral daily  metoclopramide Injectable 10 milliGRAM(s) IV Push every 8 hours  metoprolol succinate ER 25 milliGRAM(s) Oral daily  mirtazapine 7.5 milliGRAM(s) Oral at bedtime  pantoprazole  Injectable 40 milliGRAM(s) IV Push every 12 hours  potassium phosphate / sodium phosphate Tablet (K-PHOS No. 2) 1 Tablet(s) Oral two times a day  sertraline 100 milliGRAM(s) Oral daily  sodium chloride 0.9% lock flush 3 milliLiter(s) IV Push every 8 hours  sodium chloride 0.9%. 500 milliLiter(s) IV Continuous <Continuous>  spironolactone 25 milliGRAM(s) Oral daily      Objective:  Vital Signs Last 24 Hrs  T(C): 36.8 (21 Dec 2021 10:53), Max: 37 (21 Dec 2021 00:30)  T(F): 98.2 (21 Dec 2021 10:53), Max: 98.6 (21 Dec 2021 00:30)  HR: 104 (21 Dec 2021 17:30) (99 - 115)  BP: 98/70 (21 Dec 2021 17:30) (77/56 - 107/62)  BP(mean): 79 (21 Dec 2021 17:30) (63 - 79)  RR: 18 (21 Dec 2021 17:30) (16 - 18)  SpO2: 99% (21 Dec 2021 17:30) (98% - 100%)    PHYSICAL EXAM:  Constitutional:no acute distress  Eyes:ALONSO, EOMI  Ear/Nose/Throat: no oral lesions, trach stoma closing	  Respiratory: clear BL  Cardiovascular: [id number] sounds  Gastrointestinal:soft, (+) BS, no tenderness cholecystotomy tube RUQ  Extremities:no e/e/c  No Lymphadenopathy  IV sites not inflammed.    LABS:                        9.7    11.52 )-----------( 198      ( 21 Dec 2021 05:53 )             30.5     12-21    131<L>  |  97  |  16  ----------------------------<  107<H>  3.9   |  25  |  0.60    Ca    8.4      21 Dec 2021 05:51  Phos  2.5     12-21  Mg     1.8     12-21    TPro  7.1  /  Alb  2.7<L>  /  TBili  0.3  /  DBili  x   /  AST  15  /  ALT  15  /  AlkPhos  119  12-21          MICROBIOLOGY:            RECENT CULTURES:  12-18 @ 08:15  .Blood Blood  --  --  --    No growth to date.  --      RADIOLOGY & ADDITIONAL STUDIES:    < from: US Abdomen Upper Quadrant Right (12.17.21 @ 13:35) >  IMPRESSION:    Technically limited portable examination.    Gallbladder is contracted. Cholecystostomy tube is not well visualized.    No biliary ductal dilatation.    < end of copied text >

## 2021-12-21 NOTE — PROGRESS NOTE ADULT - PROBLEM SELECTOR PLAN 1
- COVID PCR + 12/13. Recent CXR clear, respiratory status stable on room air   - s/p monoclonal antibodies Regeneron   - s/p 5 day course of Remdesivir  - c/w Dexamethasone for 10 day course

## 2021-12-21 NOTE — PROGRESS NOTE ADULT - ASSESSMENT
65 y/o M with a history of Stage D NICM s/p HM2 LVAD in 9/2017 at  (due to severe PAD) with TV ring, multiple GI bleeds, thus maintained off AC, CKD 3prior COVID-19 infection in 4/2020, recurrent syncope post LVAD s/p ILR, s/p prolonged complex hospitalization from 6/14-11/16/2021 initially admitted with abdominal pain complicated by GIB, respiratory failure s/p trach, multiple infections, s/p cholecystotomy tube and SMA stent 10/2021, ultimately discharged to [facility] rehab and then returned to [facility] for trach decannulation 12/2 who now presents with recurrent COVID-19 infection, initially in distributive shock. 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     He had elevated inflammatory markers which are now improving. He received monoclonal antibodies and was started on remdesivir and dexamethasone. He's afebrile with BCx from 12/18 NGTD. He is saturating well on RA and was weaned off pressors 12/16.  He had runs of MMVT on 12/20 in setting of hypokalemia and being off his beta blocker.   He has not had any further WCT with initiation of beta blocker. BP is marginal on low dose vasodilators. He currently appears mildly hypovolemic with net negative fluid balance and hyponatremia. His LDH is stable. His LVAD is functioning well without s/s of pump malfunction.    He is nearing readiness for discharge but will need PT evaluation as he appears more deconditioned.  65 y/o M with a history of Stage D NICM s/p HM2 LVAD in 9/2017 at  (due to severe PAD) with TV ring, multiple GI bleeds, thus maintained off AC, CKD 3prior COVID-19 infection in 4/2020, recurrent syncope post LVAD s/p ILR, s/p prolonged complex hospitalization from 6/14-11/16/2021 initially admitted with abdominal pain complicated by GIB, respiratory failure s/p trach, multiple infections, s/p cholecystotomy tube and SMA stent 10/2021, ultimately discharged to [facility] rehab and then returned to [facility] for trach decannulation 12/2 who now presents with recurrent COVID-19 infection, initially in distributive shock. 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     He had elevated inflammatory markers which are now improving. He received monoclonal antibodies and was started on remdesivir and dexamethasone. He's afebrile with BCx from 12/18 NGTD. He is saturating well on RA and was weaned off pressors 12/16.  He had runs of MMVT on 12/20 in setting of hypokalemia and being off his beta blocker which has since resolved. He currently appears mildly hypovolemic with net negative fluid balance and hyponatremia. His LDH is stable. His LVAD is functioning well without s/s of pump malfunction.    He is medically ready for discharge and are assessing options for discharge to rehab.

## 2021-12-21 NOTE — PROGRESS NOTE ADULT - ATTENDING COMMENTS
No more VT since resuming BB and repleting potassium. Will administer 500 cc NS for likely hypovolemia hyponatremia. Awaiting disposition to [facility].

## 2021-12-21 NOTE — PROGRESS NOTE ADULT - ASSESSMENT
64yo man with a history of VAD palcement, history of COVID infeciton in April 2020, history of a prolonged hospitalization in 2021 with recurrent sepsis, pneumonia, intubated/trach x2 cycles , chronic gall bladder disease s/p perc dawn, SMA ischemia reuiring a stent placement, cachecxia who was discharged to rehab but is presented from [facility] on 12/13 with AMS, hypotensive, tachycardic found to be COVID (+). Pt is lethargic and mumbling words required ICU admission and pressor support.    More awake, alert, interactive   oxygen nasal requirements with reasonable saturation    COVID infection-   confirmed second bout of COVID by documented PCR testing  Ideally, would be interested in sequencing the virus to determine strain (omicron vs delta)  Received a dose of monoclonal antibodies Regeneron product   Completed remdesivir therapy  Completing ten days of dexamethasone  Refuses most vaccines- but will need to wait three months  to receive COVID vaccine series post monoclonal  Will administer flu/pneumonia vaccines this admission prior to discharge if he will permit        Serratia Bacteremia:  History of recurrent pneumonia and bacteremias   With stenotrophomonas in prior sputum cultures and enterobacter and serratia in blood in October 2021  Blood cultures sent 12/13 growing serratia-    Cefepime to 2 gram IV q 8hr as strain is sensitive to Cefepime   repeat blood cultures for evidence of clearing are pending   RUQ sonogram done to reassess- gall bladder does not show acute cholecystitis  Hiccups- possibly related to diaphragm irritation    Source of serratia not entirely clear- differential GI translocation, biliary tree, lungs, other including LVAD      Prior severe C.diff infection   pre-emptive oral Vanco 125mg bid    Will need suppressive oral quinolone post treatment of bacteremia x2weeks    discussed with CHF team    Morris Prater MD  [phone]  After 5pm/weekends [phone]

## 2021-12-21 NOTE — PROGRESS NOTE ADULT - PROBLEM SELECTOR PLAN 4
- please give 500ml NS over 5 hours  - stop hydralazine  - continue Toprol XL 25mg daily  - continue spironolactone 25 mg daily  - Daily PT  - Encourage oral intake and nutrition

## 2021-12-21 NOTE — PROGRESS NOTE ADULT - ASSESSMENT
66 yo man with a history of VAD placement history of COVID infection in April 2020, history of a prolonged hospitalization in 2021 with recurrent sepsis, pneumonia, intubated/trach x2 cycles , chronic gall bladder disease s/p perc dawn, SMA ischemia requiring a stent placement, cachexia who was discharged to rehab but is presented from [facility] on 12/13 with AMS, hypotensive, tachycardic found to be COVID (+). Pt lethargic and mumbling words required ICU admission and pressor support now titrated off and downgraded to the floor.

## 2021-12-21 NOTE — PROGRESS NOTE ADULT - SUBJECTIVE AND OBJECTIVE BOX
**************************  Bre El  PGY-1 Internal Medicine  [phone]  **************************    Patient is a 66y old  Male who presents with a chief complaint of COVID (+) with AMS and hypotension (20 Dec 2021 19:21)      SUBJECTIVE / OVERNIGHT EVENTS:  No events overnight. Patient on room air this morning, breathing comfortably. Denies all ROS. Potassium and magnesium repleted.    MEDICATIONS  (STANDING):  aspirin  chewable 81 milliGRAM(s) Oral daily  baclofen 5 milliGRAM(s) Oral every 8 hours  cefepime   IVPB 2000 milliGRAM(s) IV Intermittent every 8 hours  chlorhexidine 2% Cloths 1 Application(s) Topical <User Schedule>  dexAMETHasone  Injectable 6 milliGRAM(s) IV Push daily  dextrose 50% Injectable 50 milliLiter(s) IV Push every 15 minutes  dextrose 50% Injectable 25 milliLiter(s) IV Push every 15 minutes  enoxaparin Injectable 60 milliGRAM(s) SubCutaneous every 12 hours  hydrALAZINE 10 milliGRAM(s) Oral every 8 hours  insulin lispro (ADMELOG) corrective regimen sliding scale   SubCutaneous three times a day before meals  insulin lispro (ADMELOG) corrective regimen sliding scale   SubCutaneous at bedtime  methimazole 10 milliGRAM(s) Oral daily  metoclopramide Injectable 10 milliGRAM(s) IV Push every 8 hours  metoprolol succinate ER 25 milliGRAM(s) Oral daily  mirtazapine 7.5 milliGRAM(s) Oral at bedtime  pantoprazole  Injectable 40 milliGRAM(s) IV Push every 12 hours  potassium chloride    Tablet ER 20 milliEquivalent(s) Oral every 2 hours  potassium phosphate / sodium phosphate Tablet (K-PHOS No. 2) 1 Tablet(s) Oral two times a day  sertraline 100 milliGRAM(s) Oral daily  sodium chloride 0.9% lock flush 3 milliLiter(s) IV Push every 8 hours  spironolactone 25 milliGRAM(s) Oral daily  vancomycin    Solution 125 milliGRAM(s) Oral every 12 hours      CAPILLARY BLOOD GLUCOSE    POCT Blood Glucose.: 117 mg/dL (21 Dec 2021 07:50)  POCT Blood Glucose.: 186 mg/dL (20 Dec 2021 21:39)  POCT Blood Glucose.: 150 mg/dL (20 Dec 2021 16:36)  POCT Blood Glucose.: 123 mg/dL (20 Dec 2021 11:22)  POCT Blood Glucose.: 125 mg/dL (20 Dec 2021 08:41)    I&O's Summary    20 Dec 2021 07:01  -  21 Dec 2021 07:00  --------------------------------------------------------  IN: 420 mL / OUT: 1200 mL / NET: -780 mL      PHYSICAL EXAM:  Vital Signs Last 24 Hrs  T(C): 36.8 (21 Dec 2021 04:30), Max: 37 (21 Dec 2021 00:30)  T(F): 98.2 (21 Dec 2021 04:30), Max: 98.6 (21 Dec 2021 00:30)  HR: 105 (21 Dec 2021 04:30) (94 - 115)  BP: 91/61 (21 Dec 2021 04:30) (81/62 - 107/62)  BP(mean): 71 (21 Dec 2021 04:30) (69 - 77)  RR: 18 (21 Dec 2021 04:30) (16 - 18)  SpO2: 99% (21 Dec 2021 04:30) (98% - 100%)    CONSTITUTIONAL: NAD, well-developed; trach site bandaged c/d/i, clean underneath with clear mucus  RESPIRATORY: Normal respiratory effort; lungs are clear to auscultation bilaterally; on RA  CARDIOVASCULAR: LVAD heard; No lower extremity edema; Peripheral pulses are 2+ bilaterally  ABDOMEN: Nontender to palpation, normoactive bowel sounds, no rebound/guarding; No hepatosplenomegaly  MUSCLOSKELETAL: no clubbing or cyanosis of digits; no joint swelling or tenderness to palpation  PSYCH: A+O to person, place, and time; affect appropriate    LABS:                        9.7    11.52 )-----------( 198      ( 21 Dec 2021 05:53 )             30.5     12-21    131<L>  |  97  |  16  ----------------------------<  107<H>  3.9   |  25  |  0.60    Ca    8.4      21 Dec 2021 05:51  Phos  2.5     12-21  Mg     1.8     12-21    TPro  7.1  /  Alb  2.7<L>  /  TBili  0.3  /  DBili  x   /  AST  15  /  ALT  15  /  AlkPhos  119  12-21

## 2021-12-21 NOTE — PROGRESS NOTE ADULT - PROBLEM SELECTOR PLAN 3
- hydralazine 10 mg TID   - spironolactone 25 mg QD  - metoprolol succinate 25 mg QD  - replete Mg to 2-2.5, K 4-4.5  - Daily PT  - Encourage oral intake and nutrition

## 2021-12-22 NOTE — PROGRESS NOTE ADULT - ASSESSMENT
64 yo man with a history of VAD placement history of COVID infection in April 2020, history of a prolonged hospitalization in 2021 with recurrent sepsis, pneumonia, intubated/trach x2 cycles , chronic gall bladder disease s/p perc dawn, SMA ischemia requiring a stent placement, cachexia who was discharged to rehab but is presented from [facility] on 12/13 with AMS, hypotensive, tachycardic found to be COVID (+). Pt lethargic and mumbling words required ICU admission and pressor support now titrated off and downgraded to the floor.

## 2021-12-22 NOTE — PROGRESS NOTE ADULT - SUBJECTIVE AND OBJECTIVE BOX
Subjective:  - received 0.5L NS yesterday  - denies complaints. States he walked in room with assistance. Denies SOB, abdominal discomfort, lightheadedness    Medications:  aspirin  chewable 81 milliGRAM(s) Oral daily  baclofen 5 milliGRAM(s) Oral every 8 hours  cefepime   IVPB 2000 milliGRAM(s) IV Intermittent every 8 hours  chlorhexidine 2% Cloths 1 Application(s) Topical <User Schedule>  dextrose 50% Injectable 50 milliLiter(s) IV Push every 15 minutes  dextrose 50% Injectable 25 milliLiter(s) IV Push every 15 minutes  enoxaparin Injectable 60 milliGRAM(s) SubCutaneous every 12 hours  insulin lispro (ADMELOG) corrective regimen sliding scale   SubCutaneous three times a day before meals  insulin lispro (ADMELOG) corrective regimen sliding scale   SubCutaneous at bedtime  methimazole 10 milliGRAM(s) Oral daily  metoclopramide Injectable 10 milliGRAM(s) IV Push every 8 hours  metoprolol succinate ER 25 milliGRAM(s) Oral daily  mirtazapine 7.5 milliGRAM(s) Oral at bedtime  multivitamin 1 Tablet(s) Oral daily  pantoprazole  Injectable 40 milliGRAM(s) IV Push every 12 hours  potassium phosphate / sodium phosphate Tablet (K-PHOS No. 2) 1 Tablet(s) Oral two times a day  sertraline 100 milliGRAM(s) Oral daily  sodium chloride 0.9% lock flush 3 milliLiter(s) IV Push every 8 hours  sodium chloride 0.9%. 500 milliLiter(s) IV Continuous <Continuous>  spironolactone 25 milliGRAM(s) Oral daily  vancomycin    Solution 125 milliGRAM(s) Oral every 12 hours      Physical Exam:    Vitals:  Vital Signs Last 24 Hours  T(C): 36.6 (21 @ 12:10), Max: 36.7 (21 @ 10:00)  HR: 98 (21 @ 12:10) (96 - 104)  BP: 93/67 (21 @ 12:10) (93/63 - 108/74)  RR: 18 (21 @ 12:10) (16 - 18)  SpO2: 99% (21 @ 12:10) (92% - 99%)    Weight in k.6 ( @ 09:00)    I&O's Summary    21 Dec 2021 07:01  -  22 Dec 2021 07:00  --------------------------------------------------------  IN: 1718 mL / OUT: 2210 mL / NET: -492 mL    22 Dec 2021 07:01  -  22 Dec 2021 15:03  --------------------------------------------------------  IN: 200 mL / OUT: 800 mL / NET: -600 mL    Tele: SR    General: Frail. No distress. Comfortable.  HEENT: EOM intact. prior trach site with gauze dressing in place  Neck: Neck supple. JVP not elevated. No masses  Chest: Clear to auscultation bilaterally  CV: LVAD hum. No LE edema  Abdomen: Soft, non-distended, non-tender  Skin: No rashes or skin breakdown. DLES dressing and R UQ dawn drain dressing C/D/I  Neurology: Alert and oriented times three. Sensation intact  Psych: Flat affect.      Labs:                        9.2    12.67 )-----------( 181      ( 22 Dec 2021 06:10 )             28.9         131<L>  |  98  |  16  ----------------------------<  156<H>  4.0   |  24  |  0.70    Ca    8.8      22 Dec 2021 06:08  Phos  2.6       Mg     1.8         TPro  7.4  /  Alb  2.9<L>  /  TBili  0.2  /  DBili  x   /  AST  13  /  ALT  10  /  AlkPhos  122<H>      Lactate Dehydrogenase, Serum: 196 U/L ( @ 06:08)  Lactate Dehydrogenase, Serum: 216 U/L ( @ 05:51)   Subjective:  - received 0.5L NS yesterday  - denies complaints. States he walked in room with assistance. Denies SOB, abdominal discomfort, lightheadedness    Medications:  aspirin  chewable 81 milliGRAM(s) Oral daily  baclofen 5 milliGRAM(s) Oral every 8 hours  cefepime   IVPB 2000 milliGRAM(s) IV Intermittent every 8 hours  chlorhexidine 2% Cloths 1 Application(s) Topical <User Schedule>  dextrose 50% Injectable 50 milliLiter(s) IV Push every 15 minutes  dextrose 50% Injectable 25 milliLiter(s) IV Push every 15 minutes  enoxaparin Injectable 60 milliGRAM(s) SubCutaneous every 12 hours  insulin lispro (ADMELOG) corrective regimen sliding scale   SubCutaneous three times a day before meals  insulin lispro (ADMELOG) corrective regimen sliding scale   SubCutaneous at bedtime  methimazole 10 milliGRAM(s) Oral daily  metoclopramide Injectable 10 milliGRAM(s) IV Push every 8 hours  metoprolol succinate ER 25 milliGRAM(s) Oral daily  mirtazapine 7.5 milliGRAM(s) Oral at bedtime  multivitamin 1 Tablet(s) Oral daily  pantoprazole  Injectable 40 milliGRAM(s) IV Push every 12 hours  potassium phosphate / sodium phosphate Tablet (K-PHOS No. 2) 1 Tablet(s) Oral two times a day  sertraline 100 milliGRAM(s) Oral daily  sodium chloride 0.9% lock flush 3 milliLiter(s) IV Push every 8 hours  sodium chloride 0.9%. 500 milliLiter(s) IV Continuous <Continuous>  spironolactone 25 milliGRAM(s) Oral daily  vancomycin    Solution 125 milliGRAM(s) Oral every 12 hours      Physical Exam:    Vitals:  Vital Signs Last 24 Hours  T(C): 36.6 (21 @ 12:10), Max: 36.7 (21 @ 10:00)  HR: 98 (21 @ 12:10) (96 - 104)  BP: 93/67 (21 @ 12:10) (93/63 - 108/74)  RR: 18 (21 @ 12:10) (16 - 18)  SpO2: 99% (21 @ 12:10) (92% - 99%)    LVAD Interrogation:  Heart Mate 2  Speed 9200  Flow 5.2  Power 5.5  PI 5.6  No events/alarms    Weight in k.6 ( @ 09:00)    I&O's Summary    21 Dec 2021 07:01  -  22 Dec 2021 07:00  --------------------------------------------------------  IN: 1718 mL / OUT: 2210 mL / NET: -492 mL    22 Dec 2021 07:01  -  22 Dec 2021 15:03  --------------------------------------------------------  IN: 200 mL / OUT: 800 mL / NET: -600 mL    Tele: SR    General: Frail. No distress. Comfortable.  HEENT: EOM intact. prior trach site with gauze dressing in place  Neck: Neck supple. JVP not elevated. No masses  Chest: Clear to auscultation bilaterally  CV: LVAD hum. No LE edema  Abdomen: Soft, non-distended, non-tender  Skin: No rashes or skin breakdown. DLES dressing and R UQ dawn drain dressing C/D/I  Neurology: Alert and oriented times three. Sensation intact  Psych: Flat affect.      Labs:                        9.2    12.67 )-----------( 181      ( 22 Dec 2021 06:10 )             28.9         131<L>  |  98  |  16  ----------------------------<  156<H>  4.0   |  24  |  0.70    Ca    8.8      22 Dec 2021 06:08  Phos  2.6       Mg     1.8         TPro  7.4  /  Alb  2.9<L>  /  TBili  0.2  /  DBili  x   /  AST  13  /  ALT  10  /  AlkPhos  122<H>      Lactate Dehydrogenase, Serum: 196 U/L (12-22 @ 06:08)  Lactate Dehydrogenase, Serum: 216 U/L ( @ 05:51)

## 2021-12-22 NOTE — PROGRESS NOTE ADULT - ATTENDING COMMENTS
66 yo man with a history of VAD placement history of COVID infection in April 2020, history of a prolonged hospitalization in 2021 with recurrent sepsis, pneumonia, intubated/trach x2 cycles , chronic gall bladder disease s/p perc dawn, SMA ischemia requiring a stent placement, cachexia who was discharged to rehab but is presented from [facility] on 12/13 with AMS, hypotensive, tachycardic found to be COVID (+). Pt is lethargic and mumbling words required ICU admission and pressor support now titrated off and downgraded to the floor.     Serratia Bacteremia, source unclear, LVAD not compatible with MRI and GI feels unlikely to be a GI source  On Cefepime since 12/13  Leukocytosis likely reactive due to dexamethasone, last dose today - repeat blood culture 12/18 NGTD  COVID19 s/p monoclonal ab, Remdesivir and Dexamethasone currently on room air - will discuss vaccination prior to discharge  Intractable Hiccups - on Baclofen, doing well  Amiodarone Toxicity - on Methimazole  Full code - palliative following regarding GOC  C diff - on PO Vancomycin  Rest of plan as above  DC planning - still COVID positive, can dc once negative  D/w team in detail 64 yo man with a history of VAD placement history of COVID infection in April 2020, history of a prolonged hospitalization in 2021 with recurrent sepsis, pneumonia, intubated/trach x2 cycles , chronic gall bladder disease s/p perc dawn, SMA ischemia requiring a stent placement, cachexia who was discharged to rehab but is presented from [facility] on 12/13 with AMS, hypotensive, tachycardic found to be COVID (+). Pt is lethargic and mumbling words required ICU admission and pressor support now titrated off and downgraded to the floor.     Serratia Bacteremia, source unclear, LVAD not compatible with MRI and GI feels unlikely to be a GI source  On Cefepime since 12/13 f/u ID for duration- 2 weeks quinolone post treatment  Leukocytosis likely reactive due to dexamethasone, last dose today - repeat blood culture 12/18 NGTD  COVID19 s/p monoclonal ab, Remdesivir and Dexamethasone currently on room air - will discuss vaccination prior to discharge  Intractable Hiccups - on Baclofen, doing well  Amiodarone Toxicity - on Methimazole  Full code - palliative following regarding GOC  C diff - on PO Vancomycin  Rest of plan as above  DC planning - still COVID positive, can dc once negative  D/w team in detail

## 2021-12-22 NOTE — PROGRESS NOTE ADULT - PROBLEM SELECTOR PLAN 3
- hydralazine discontinued  - 500 NS bolus given 12/21 for hypovolemic hyponatremia   - spironolactone 25 mg QD  - metoprolol succinate 25 mg QD  - replete Mg to 2-2.5, K 4-4.5  - Daily PT  - Encourage oral intake and nutrition

## 2021-12-22 NOTE — PROGRESS NOTE ADULT - PROBLEM SELECTOR PLAN 5
- history of thyrotoxicosis with amio, on methimazole  - continue Toprol XL 25mg daily, no further VT since resuming beta blocker   - Electrolyte repletion to maintain K 4-4.5 and Mg 2-2.5

## 2021-12-22 NOTE — PROGRESS NOTE ADULT - PROBLEM SELECTOR PLAN 1
- COVID PCR + 12/13. Recent CXR clear, respiratory status stable on room air   - s/p monoclonal antibodies Regeneron   - s/p 5 day course of Remdesivir  - s/p 10 day course of Dexamethasone

## 2021-12-22 NOTE — PROGRESS NOTE ADULT - SUBJECTIVE AND OBJECTIVE BOX
**************************  Bre El  PGY-1 Internal Medicine  [phone]  **************************    Patient is a 66y old  Male who presents with a chief complaint of COVID (+) with AMS and hypotension (21 Dec 2021 19:33)      SUBJECTIVE / OVERNIGHT EVENTS:  No overnight events. Sitting up in bed this morning eating breakfast. Potassium repleted. Patient breathing comfortably on room air, no concerns at this time.    MEDICATIONS  (STANDING):  aspirin  chewable 81 milliGRAM(s) Oral daily  baclofen 5 milliGRAM(s) Oral every 8 hours  cefepime   IVPB 2000 milliGRAM(s) IV Intermittent every 8 hours  chlorhexidine 2% Cloths 1 Application(s) Topical <User Schedule>  dexAMETHasone  Injectable 6 milliGRAM(s) IV Push daily  dextrose 50% Injectable 50 milliLiter(s) IV Push every 15 minutes  dextrose 50% Injectable 25 milliLiter(s) IV Push every 15 minutes  enoxaparin Injectable 60 milliGRAM(s) SubCutaneous every 12 hours  insulin lispro (ADMELOG) corrective regimen sliding scale   SubCutaneous three times a day before meals  insulin lispro (ADMELOG) corrective regimen sliding scale   SubCutaneous at bedtime  magnesium sulfate  IVPB 2 Gram(s) IV Intermittent once  methimazole 10 milliGRAM(s) Oral daily  metoclopramide Injectable 10 milliGRAM(s) IV Push every 8 hours  metoprolol succinate ER 25 milliGRAM(s) Oral daily  mirtazapine 7.5 milliGRAM(s) Oral at bedtime  pantoprazole  Injectable 40 milliGRAM(s) IV Push every 12 hours  potassium phosphate / sodium phosphate Tablet (K-PHOS No. 2) 1 Tablet(s) Oral two times a day  sertraline 100 milliGRAM(s) Oral daily  sodium chloride 0.9% lock flush 3 milliLiter(s) IV Push every 8 hours  sodium chloride 0.9%. 500 milliLiter(s) (100 mL/Hr) IV Continuous <Continuous>  spironolactone 25 milliGRAM(s) Oral daily  vancomycin    Solution 125 milliGRAM(s) Oral every 12 hours      CAPILLARY BLOOD GLUCOSE    POCT Blood Glucose.: 147 mg/dL (22 Dec 2021 07:57)  POCT Blood Glucose.: 162 mg/dL (21 Dec 2021 22:43)  POCT Blood Glucose.: 149 mg/dL (21 Dec 2021 16:47)  POCT Blood Glucose.: 201 mg/dL (21 Dec 2021 11:26)    I&O's Summary    21 Dec 2021 07:01  -  22 Dec 2021 07:00  --------------------------------------------------------  IN: 1718 mL / OUT: 2210 mL / NET: -492 mL      PHYSICAL EXAM:  Vital Signs Last 24 Hrs  T(C): 36.6 (22 Dec 2021 05:00), Max: 36.8 (21 Dec 2021 10:53)  T(F): 97.8 (22 Dec 2021 05:00), Max: 98.2 (21 Dec 2021 10:53)  HR: 102 (22 Dec 2021 05:00) (99 - 104)  BP: 94/67 (22 Dec 2021 05:00) (77/56 - 108/74)  BP(mean): 76 (22 Dec 2021 05:00) (63 - 85)  RR: 18 (22 Dec 2021 05:00) (16 - 18)  SpO2: 92% (22 Dec 2021 05:00) (92% - 99%)    CONSTITUTIONAL: NAD, well-developed; trach site bandaged c/d/i, clean underneath with clear mucus  RESPIRATORY: Normal respiratory effort; lungs are clear to auscultation bilaterally; on RA  CARDIOVASCULAR: LVAD heard; No lower extremity edema; Peripheral pulses are 2+ bilaterally  ABDOMEN: Nontender to palpation, normoactive bowel sounds, no rebound/guarding; No hepatosplenomegaly  MUSCLOSKELETAL: no clubbing or cyanosis of digits; no joint swelling or tenderness to palpation  PSYCH: A+O to person, place, and time; affect appropriate    LABS:                        9.2    12.67 )-----------( 181      ( 22 Dec 2021 06:10 )             28.9     12-22    131<L>  |  98  |  16  ----------------------------<  156<H>  4.0   |  24  |  0.70    Ca    8.8      22 Dec 2021 06:08  Phos  2.6     12-22  Mg     1.8     12-22    TPro  7.4  /  Alb  2.9<L>  /  TBili  0.2  /  DBili  x   /  AST  13  /  ALT  10  /  AlkPhos  122<H>  12-22

## 2021-12-22 NOTE — PROGRESS NOTE ADULT - PROBLEM SELECTOR PLAN 1
- Continue with speed of 9200  - s/p controller change on 12/16   - Daily LDH  - Continue ASA 81 mg daily  - He is off coumadin due to persistent recurring GI bleeding

## 2021-12-22 NOTE — CHART NOTE - NSCHARTNOTEFT_GEN_A_CORE
Nutrition Follow Up Note  Patient seen for: Initial Malnutrition follow-up    Chart reviewed, events noted. Per chart: 66M, PMH of Stage D NICM s/p HM2 LVAD (2017), multiple GI bleeds (thus maintained off AC), CKD 3, prior COVID-19 infection (2020), s/p prolonged complex hospitalization from -2021 initially admitted with abdominal pain complicated by GIB, respiratory failure s/p trach, multiple infections, s/p cholecystotomy tube and SMA stent 10/2021, ultimately discharged to [facility] rehab and then returned to [facility] for trach decannulation  who now presents with recurrent COVID-19 infection in shock.     Source: EMR, Team    - Unable to conduct a face to face interview or nutrition-focused physical exam due to limited contact restrictions related to Pt's medical condition and isolation precautions.     Diet Order:   Diet, Regular:   Supplement Feeding Modality:  Oral  Glucerna Shake Cans or Servings Per Day:  2       Frequency:  Daily (-)    Is current diet order appropriate/adequate? [x] Yes  []  No:     PO intake :   [x] >75%  Adequate    - RN notes pt eating well at meals without any difficulties chewing/swallowing or additional GI distress.    Nutrition-Related Events:  - Swallow eval  with SLP recommendations for "Regular solids and thin liquids"  - Upon prior hospitalization was with EN for prolonged period and then advanced to PO diet but was with some dysphagia.  - Insulin Sliding Scale ordered to optimize BG; noted s/p 10 day course of Dexamethasone.    GI:  Last BM .   Bowel Regimen? [] Yes   [x] No  - Noted on abx course at this time  - Reglan ordered since admit  for gut motility    Daily Weights in K.7 (12-15)  - No additional weights indicated in chart at this time; Pt noted to be receiving aldactone. Will continue to monitor/trend weight status     Drug Dosing Weight  Weight (kg): 55.7 ()  BMI (kg/m2): 16.7 ()  IBW: 178 Ibs/80.7 kg    MEDICATIONS  (STANDING):  cefepime   IVPB  dextrose 50% Injectable  dextrose 50% Injectable  insulin lispro (ADMELOG) corrective regimen sliding scale  insulin lispro (ADMELOG) corrective regimen sliding scale  methimazole  metoprolol succinate ER  pantoprazole  Injectable  potassium phosphate / sodium phosphate Tablet (K-PHOS No. 2)  sodium chloride 0.9% lock flush  sodium chloride 0.9%.  spironolactone  vancomycin    Solution    Pertinent Labs:  @ 06:08: Na 131<L>, BUN 16, Cr 0.70, <H>, K+ 4.0, Phos 2.6, Mg 1.8, Alk Phos 122<H>, ALT/SGPT 10, AST/SGOT 13, HbA1c --    A1C with Estimated Average Glucose Result: 5.4 % (08-15-21 @ 13:52)    Finger Sticks:  POCT Blood Glucose.: 147 mg/dL ( @ 07:57)  POCT Blood Glucose.: 162 mg/dL ( @ 22:43)  POCT Blood Glucose.: 149 mg/dL ( @ 16:47)  POCT Blood Glucose.: 201 mg/dL ( @ 11:26)    Skin per nursing documentation: no pressure injuries noted  Edema: none    Estimated Nutritional Needs  Based on 12/15 weight: 122.7 lb/55.6 kg  Energy Needs (30-35 kcals/kg): 0385-6676  Protein Needs (1.5-1.8 g/kg): 83.4-100.08    Previous Nutrition Diagnosis: Severe-chronic Malnutrition & Underweight  Nutrition Diagnosis is: [x] ongoing - addressed with PO diet, oral nutrition supplementation; to further be addressed with micronutrient supplementation    New Nutrition Diagnosis: [x] Not applicable    Nutrition Care Plan:  [x] In Progress  [] Achieved  [] Not applicable      Recommendations:      1. Continue Current diet as ordered with Glucerna Shake BID  2. Consider addition of Multivitamin supplementation pending no existing contraindications   3. In setting of COVID-19, recommend obtain Vitamin D levels - if low please supplement as appropriate.   4. RD to continue providing Magic Cup and dietary preferences as feasible     Monitoring and Evaluation:   Continue to monitor nutritional intake, tolerance to diet prescription, weights, labs, skin integrity  RD remains available upon request and will follow up per protocol    Wendy Travis, MS, RD, CDN, [facility]  pager #[phone]

## 2021-12-22 NOTE — PROGRESS NOTE ADULT - ASSESSMENT
67 y/o M with a history of Stage D NICM s/p HM2 LVAD in 9/2017 at  (due to severe PAD) with TV ring, multiple GI bleeds, thus maintained off AC, CKD 3prior COVID-19 infection in 4/2020, recurrent syncope post LVAD s/p ILR, s/p prolonged complex hospitalization from 6/14-11/16/2021 initially admitted with abdominal pain complicated by GIB, respiratory failure s/p trach, multiple infections, s/p cholecystotomy tube and SMA stent 10/2021, ultimately discharged to [facility] rehab and then returned to [facility] for trach decannulation 12/2 who now presents with recurrent COVID-19 infection, initially in distributive shock. 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     He had elevated inflammatory markers which are now improving. He received monoclonal antibodies and was started on remdesivir and dexamethasone. He's afebrile with BCx from 12/18 NGTD. He is saturating well on RA and was weaned off pressors 12/16. He had runs of MMVT on 12/20 in setting of hypokalemia and being off his beta blocker which has since resolved. he currently appears well compensated from HF perspective. His LDH is stable. His LVAD is functioning well without s/s of pump malfunction.    He is medically ready for discharge and are assessing options for discharge to rehab.

## 2021-12-23 NOTE — PROGRESS NOTE ADULT - SUBJECTIVE AND OBJECTIVE BOX
**************************  Bre El  PGY-1 Internal Medicine  [phone]  **************************    Patient is a 66y old  Male who presents with a chief complaint of COVID (+) with AMS and hypotension (22 Dec 2021 15:03)      SUBJECTIVE / OVERNIGHT EVENTS:  Yesterday, evening had 73 beats of wide complex, but was asymptomatic. Overnight on tele, patient in sinus 80-90s with occasional PVCs. This morning, lying comfortably in bed. Denies all ROS.    MEDICATIONS  (STANDING):  aspirin  chewable 81 milliGRAM(s) Oral daily  baclofen 5 milliGRAM(s) Oral every 8 hours  cefepime   IVPB 2000 milliGRAM(s) IV Intermittent every 8 hours  chlorhexidine 2% Cloths 1 Application(s) Topical <User Schedule>  dextrose 50% Injectable 50 milliLiter(s) IV Push every 15 minutes  dextrose 50% Injectable 25 milliLiter(s) IV Push every 15 minutes  enoxaparin Injectable 60 milliGRAM(s) SubCutaneous every 12 hours  insulin lispro (ADMELOG) corrective regimen sliding scale   SubCutaneous three times a day before meals  insulin lispro (ADMELOG) corrective regimen sliding scale   SubCutaneous at bedtime  magnesium sulfate  IVPB 2 Gram(s) IV Intermittent once  methimazole 10 milliGRAM(s) Oral daily  metoclopramide Injectable 10 milliGRAM(s) IV Push every 8 hours  metoprolol succinate ER 25 milliGRAM(s) Oral daily  mirtazapine 7.5 milliGRAM(s) Oral at bedtime  multivitamin 1 Tablet(s) Oral daily  pantoprazole  Injectable 40 milliGRAM(s) IV Push every 12 hours  potassium phosphate / sodium phosphate Tablet (K-PHOS No. 2) 1 Tablet(s) Oral two times a day  sertraline 100 milliGRAM(s) Oral daily  sodium chloride 0.9% lock flush 3 milliLiter(s) IV Push every 8 hours  sodium chloride 0.9%. 500 milliLiter(s) (100 mL/Hr) IV Continuous <Continuous>  spironolactone 25 milliGRAM(s) Oral daily  vancomycin    Solution 125 milliGRAM(s) Oral every 12 hours      CAPILLARY BLOOD GLUCOSE    POCT Blood Glucose.: 111 mg/dL (23 Dec 2021 08:24)  POCT Blood Glucose.: 283 mg/dL (22 Dec 2021 21:43)  POCT Blood Glucose.: 188 mg/dL (22 Dec 2021 16:41)  POCT Blood Glucose.: 284 mg/dL (22 Dec 2021 11:41)    I&O's Summary    22 Dec 2021 07:01  -  23 Dec 2021 07:00  --------------------------------------------------------  IN: 820 mL / OUT: 2050 mL / NET: -1230 mL      PHYSICAL EXAM:  Vital Signs Last 24 Hrs  T(C): 36.7 (23 Dec 2021 06:55), Max: 37.2 (23 Dec 2021 05:00)  T(F): 98 (23 Dec 2021 06:55), Max: 99 (23 Dec 2021 05:00)  HR: 101 (23 Dec 2021 06:55) (96 - 103)  BP: 102/73 (23 Dec 2021 05:00) (92/67 - 102/73)  BP(mean): 80 (23 Dec 2021 06:55) (72 - 80)  RR: 18 (23 Dec 2021 06:55) (18 - 18)  SpO2: 95% (23 Dec 2021 06:55) (95% - 99%)    CONSTITUTIONAL: NAD, well-developed; trach site bandaged c/d/i, clean underneath with clear mucus  RESPIRATORY: Normal respiratory effort; lungs are clear to auscultation bilaterally; on RA  CARDIOVASCULAR: LVAD heard; No lower extremity edema; Peripheral pulses are 2+ bilaterally  ABDOMEN: Nontender to palpation, normoactive bowel sounds, no rebound/guarding; No hepatosplenomegaly  MUSCLOSKELETAL: no clubbing or cyanosis of digits; no joint swelling or tenderness to palpation  PSYCH: A+O to person, place, and time; affect appropriate    LABS:                        9.5    12.75 )-----------( 180      ( 23 Dec 2021 06:08 )             29.9     12-23    130<L>  |  97  |  18  ----------------------------<  100<H>  4.2   |  24  |  0.69    Ca    8.9      23 Dec 2021 06:09  Phos  2.6     12-23  Mg     1.8     12-23    TPro  7.7  /  Alb  3.0<L>  /  TBili  0.2  /  DBili  x   /  AST  15  /  ALT  15  /  AlkPhos  123<H>  12-23

## 2021-12-23 NOTE — PROGRESS NOTE ADULT - ASSESSMENT
64 yo man with a history of VAD placement history of COVID infection in April 2020, history of a prolonged hospitalization in 2021 with recurrent sepsis, pneumonia, intubated/trach x2 cycles , chronic gall bladder disease s/p perc dawn, SMA ischemia requiring a stent placement, cachexia who was discharged to rehab but is presented from [facility] on 12/13 with AMS, hypotensive, tachycardic found to be COVID (+). Pt lethargic and mumbling words required ICU admission and pressor support now titrated off and downgraded to the floor.

## 2021-12-23 NOTE — PROGRESS NOTE ADULT - ATTENDING COMMENTS
pAT overnight, will increase metoprolol to 50 mg daily. Still with hyponatremia, likely hypovolemic. Will administer 1 L NS today and advised increased PO intake. Awaiting negative COVID swab to be accepted at [facility] rehab. SW assistance appreciated. Antibiotics to be switched to PO per ID discretion.

## 2021-12-23 NOTE — PROGRESS NOTE ADULT - SUBJECTIVE AND OBJECTIVE BOX
Subjective:  - NAEO  - sitting up comfortably in bed with no complaints    Medications:  aspirin  chewable 81 milliGRAM(s) Oral daily  baclofen 5 milliGRAM(s) Oral every 8 hours  cefepime   IVPB 2000 milliGRAM(s) IV Intermittent every 8 hours  chlorhexidine 2% Cloths 1 Application(s) Topical <User Schedule>  dextrose 50% Injectable 50 milliLiter(s) IV Push every 15 minutes  dextrose 50% Injectable 25 milliLiter(s) IV Push every 15 minutes  enoxaparin Injectable 60 milliGRAM(s) SubCutaneous every 12 hours  insulin lispro (ADMELOG) corrective regimen sliding scale   SubCutaneous three times a day before meals  insulin lispro (ADMELOG) corrective regimen sliding scale   SubCutaneous at bedtime  methimazole 10 milliGRAM(s) Oral daily  metoclopramide Injectable 10 milliGRAM(s) IV Push every 8 hours  metoprolol succinate ER 25 milliGRAM(s) Oral once  mirtazapine 7.5 milliGRAM(s) Oral at bedtime  multivitamin 1 Tablet(s) Oral daily  pantoprazole  Injectable 40 milliGRAM(s) IV Push every 12 hours  potassium phosphate / sodium phosphate Tablet (K-PHOS No. 2) 1 Tablet(s) Oral two times a day  sertraline 100 milliGRAM(s) Oral daily  sodium chloride 0.9% Bolus 1000 milliLiter(s) IV Bolus once  sodium chloride 0.9% lock flush 3 milliLiter(s) IV Push every 8 hours  sodium chloride 0.9%. 500 milliLiter(s) IV Continuous <Continuous>  spironolactone 25 milliGRAM(s) Oral daily  vancomycin    Solution 125 milliGRAM(s) Oral every 12 hours      ICU Vital Signs Last 24 Hrs  T(C): 36.7, Max: 37.2 (-23 @ 05:00)  HR: 100 (96 - 103)  BP: 86/66 (86/66 - 102/73)  BP(mean): 73 (73 - 80)  ABP: --  ABP(mean): --  RR: 18 (18 - 18)  SpO2: 99% (95% - 99%)    Weight in k (12-23-21)  Weight in k.6 (12-22-21)      I&O's Summary Last 24 Hrs    IN: 820 mL / OUT: 2050 mL / NET: -1230 mL      Tele: SR/ST 80-120s, PVCs, PAT for 6.9 seconds up to 130s    Physical Exam:    General: Frail. No distress. Comfortable.  HEENT: EOM intact. prior trach site with gauze dressing in place  Neck: Neck supple. JVP not elevated. No masses  Chest: Clear to auscultation bilaterally  CV: LVAD hum. No LE edema  Abdomen: Soft, non-distended, non-tender  Skin: No rashes or skin breakdown. DLES dressing and R UQ dawn drain dressing C/D/I  Neurology: Alert and oriented times three. Sensation intact  Psych: Flat affect.    LVAD Interrogation:  Heart Mate 2  Speed 9200  Flow 5.0  Power 5.2  PI 5.3  No events/alarms    Labs:    ( 21 @ 06:08 )               9.5    12.75 )--------( 180                  29.9     ( 21 @ 06:09 )     130  |  97  |  18  ---------------------<  100  4.2  |  24  |  0.69    Ca 8.9  Phos 2.6  Mg 1.8    ( 21 @ 06:09 )  TPro  7.7  /  Alb  3.0  /  TBili  0.2  /  DBili  x   /  AST  15  /  ALT  15  /  AlkPhos  123  ( 21 @ 06:08 )  TPro  7.4  /  Alb  2.9  /  TBili  0.2  /  DBili  x   /  AST  13  /  ALT  10  /  AlkPhos  122    Lactate Dehydrogenase, Serum: 204 (21 @ 06:09)  Lactate Dehydrogenase, Serum: 196 (21 @ 06:08)

## 2021-12-23 NOTE — PROGRESS NOTE ADULT - PROBLEM SELECTOR PLAN 1
- Continue with speed of 9200  - s/p controller change on 12/16   - Daily LDH  - Continue ASA 81 mg daily  - He is off coumadin due to persistent recurring GI bleeding - Continue with speed of 9200  - s/p controller change on 12/16   - Daily LDH  - Continue ASA 81 mg daily  - He is off coumadin due to persistent recurrent GI bleeding

## 2021-12-23 NOTE — PROGRESS NOTE ADULT - ATTENDING COMMENTS
66 yo man with a history of Stage D HF s/p VAD placement ,history of COVID infection in April 2020, history of a prolonged hospitalization in 2021 with recurrent sepsis, pneumonia, intubated/trach x2 cycles , chronic gall bladder disease s/p perc dawn, SMA ischemia requiring a stent placement, cachexia who was discharged to rehab but presented from [facility] on 12/13 with AMS, hypotension, tachycardia found to be COVID (+) + serratia bacteremia. Initially admitted to ICU requiring pressor support, now on floors.     Serratia Bacteremia, source unclear, LVAD not compatible with MRI and GI feels unlikely to be a GI source  On Cefepime since 12/13 f/u ID for duration- 2 weeks of quinolone post treatment  Leukocytosis likely reactive due to dexamethasone, last dose 12/22 - CTM, no signs of new infection at this time  COVID19 s/p monoclonal ab, Remdesivir and Dexamethasone currently on room air - will discuss vaccination prior to discharge  HF: intermittent episodes of hypotension. Given poor po intake likely hypovolemic - 1L/10hrs per HF, ctm  Intractable Hiccups - on Baclofen  Amiodarone Toxicity - on Methimazole  Full code - palliative following regarding GOC  C diff - on PO Vancomycin  DC planning - still COVID positive, can dc once negative

## 2021-12-23 NOTE — PROGRESS NOTE ADULT - PROBLEM SELECTOR PLAN 4
- please give 1L NS over 10 hours  - will increase Toprol XL to 50 mg daily  - continue spironolactone 25 mg daily  - Daily PT  - Encourage oral intake and nutrition

## 2021-12-23 NOTE — PROGRESS NOTE ADULT - PROBLEM SELECTOR PLAN 2
- Dr. Prater, ID following  - COVID PCR positive 12/13  - s/p monoclonal antibodies, remdesivir, and dexamethasone  - Serratia bacteremia- continue IV cefepime per ID  - plan for 2 weeks of oral quinalones following treatment for bacteremia per ID  - gallbladder US unremarkable

## 2021-12-23 NOTE — PROGRESS NOTE ADULT - ASSESSMENT
65 y/o M with a history of Stage D NICM s/p HM2 LVAD in 9/2017 at  (due to severe PAD) with TV ring, multiple GI bleeds, thus maintained off AC, CKD 3prior COVID-19 infection in 4/2020, recurrent syncope post LVAD s/p ILR, s/p prolonged complex hospitalization from 6/14-11/16/2021 initially admitted with abdominal pain complicated by GIB, respiratory failure s/p trach, multiple infections, s/p cholecystotomy tube and SMA stent 10/2021, ultimately discharged to [facility] rehab and then returned to [facility] for trach decannulation 12/2 who now presents with recurrent COVID-19 infection, initially in distributive shock. 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     He had elevated inflammatory markers which are now improving. He received monoclonal antibodies and was started on remdesivir and dexamethasone. He's afebrile with BCx from 12/18 NGTD. He is saturating well on RA and was weaned off pressors 12/16. He had runs of MMVT on 12/20 in setting of hypokalemia and being off his beta blocker which has since resolved. he currently appears well compensated from HF perspective. His LDH is stable. His LVAD is functioning well without s/s of pump malfunction.    He is medically ready for discharge and are assessing options for discharge to rehab.

## 2021-12-23 NOTE — PROGRESS NOTE ADULT - PROBLEM SELECTOR PLAN 5
- history of thyrotoxicosis with amio, on methimazole  - continue BB as above, no further VT since resuming beta blocker   - Electrolyte repletion to maintain K 4-4.5 and Mg 2-2.5

## 2021-12-24 NOTE — DISCHARGE NOTE PROVIDER - CARE PROVIDER_API CALL
Elen Alas)  Internal Medicine  [address] [address]  Phone: [phone]  Fax: [phone]  Follow Up Time:    Elen Alas)  Internal Medicine  [address] [address]  Phone: [phone]  Fax: [phone]  Follow Up Time: 2 weeks    Timothy Mujica)  Internal Medicine; Pulmonary Disease  [address] [facility], [address]  [place] [address]  Phone: [phone]  Fax: [phone]  Follow Up Time: 1 month   Elen Alas)  Internal Medicine  [address] [address]  Phone: [phone]  Fax: [phone]  Follow Up Time: 2 weeks    Timothy Mujica)  Internal Medicine; Pulmonary Disease  [address] [facility], [address]  [place] [address]  Phone: [phone]  Fax: [phone]  Follow Up Time: 1 month    Braulio Andrade)  Internal Medicine  [address] [facility], [address]  [place] [address]  Phone: [phone]  Fax: [phone]  Follow Up Time: 2 weeks

## 2021-12-24 NOTE — DISCHARGE NOTE PROVIDER - DETAILS OF MALNUTRITION DIAGNOSIS/DIAGNOSES
This patient has been assessed with a concern for Malnutrition and was treated during this hospitalization for the following Nutrition diagnosis/diagnoses:     -  12/15/2021: Severe protein-calorie malnutrition   -  12/15/2021: Underweight (BMI < 19)

## 2021-12-24 NOTE — PROGRESS NOTE ADULT - ASSESSMENT
66 yo man with a history of VAD placement history of COVID infection in April 2020, history of a prolonged hospitalization in 2021 with recurrent sepsis, pneumonia, intubated/trach x2 cycles , chronic gall bladder disease s/p perc dawn, SMA ischemia requiring a stent placement, cachexia who was discharged to rehab but is presented from [facility] on 12/13 with AMS, hypotensive, tachycardic found to be COVID (+). Pt lethargic and mumbling words required ICU admission and pressor support now titrated off and downgraded to the floor.

## 2021-12-24 NOTE — PROGRESS NOTE ADULT - PROBLEM SELECTOR PLAN 3
- hydralazine discontinued  - spironolactone 25 mg QD  - metoprolol succinate increased to 50 mg QD  - replete Mg to 2-2.5, K 4-4.5  - Daily PT  - Encourage oral intake and nutrition

## 2021-12-24 NOTE — DISCHARGE NOTE PROVIDER - HOSPITAL COURSE
65M [facility] ACC/AHA stage D HF due to NICM HM2 LVAD , TV annuloplasty ring 9/12/17 as destination therapy due to severe peripheral artery disease with significant stenosis  SIADH, Depression, CKD-3 with hyperkalemia, past E. coli UTIs, driveline drainage (1/7/21) and COVID-19 (back in April 2020). Recurrent syncope post LVAD s/p ILR, and chronic abdominal pain and was noted to have a prolong hospital course (6/14-11/16). During that time he has multiple infections and now remains on suppressive antibiotics. Underwent SMA stent with Vascular in 10/2021, now tolerating PO diet, perc dawn drain placement and multiple GI bleeds. Ultimately was trach and discharged to rehab to get stronger. Patient returned from rehab for trach decannulation, which was removed on 12/2.    Now presented from [facility] on 12/13 with AMS, hypotensive, tachycardic found to be COVID (+). Pt is lethargic and mumbling words.     Pt initially required ICU admission and pressor support, presumably secondary to septic shock in the setting of his covid infection, but he subsequently was titrated off of his vasopressors and was downgraded to the floors. Found to have serratia bacteremia with unclear etiology and started on cefepime. On discharge, his cefepime was changed to .... for an additional 2 weeks. He will also continue his oral vanc for 2 weeks past his cefepime course. S/p remdesivir + dexamethasone, s/p regeneron.     On the floors, patient was followed by heart failure. He was maintained on metoprolol 50mg QD, spironolactone 25mg QD, and monitored on tele.    Patient is HD stable and ready for dc. 65M [facility] ACC/AHA stage D HF due to NICM HM2 LVAD , TV annuloplasty ring 9/12/17 as destination therapy due to severe peripheral artery disease with significant stenosis  SIADH, Depression, CKD-3 with hyperkalemia, past E. coli UTIs, driveline drainage (1/7/21) and COVID-19 (back in April 2020). Recurrent syncope post LVAD s/p ILR, and chronic abdominal pain and was noted to have a prolong hospital course (6/14-11/16). During that time he has multiple infections and now remains on suppressive antibiotics. Underwent SMA stent with Vascular in 10/2021, now tolerating PO diet, perc dawn drain placement and multiple GI bleeds. Ultimately was trach and discharged to rehab to get stronger. Patient returned from rehab for trach decannulation, which was removed on 12/2.    Now presented from [facility] on 12/13 with AMS, hypotensive, tachycardic found to be COVID (+). Pt is lethargic and mumbling words.     Pt initially required ICU admission and pressor support, presumably secondary to septic shock in the setting of his covid infection, but he subsequently was titrated off of his vasopressors and was downgraded to the floors. Found to have serratia bacteremia with unclear etiology and started on cefepime, which was later changed to bactrim. He will be discharged on an oral quinolone for an additional 2 weeks. He will also continue his oral vanc for 2 weeks past his quinolone course. S/p remdesivir + dexamethasone, s/p regeneron.     On the floors, patient was followed by heart failure. He was maintained on metoprolol 50mg QD, spironolactone 25mg QD, and monitored on tele.    Patient is HD stable and ready for dc. 65M [facility] ACC/AHA stage D HF due to NICM HM2 LVAD , TV annuloplasty ring 9/12/17 as destination therapy due to severe peripheral artery disease with significant stenosis  SIADH, Depression, CKD-3 with hyperkalemia, past E. coli UTIs, driveline drainage (1/7/21) and COVID-19 (back in April 2020). Recurrent syncope post LVAD s/p ILR, and chronic abdominal pain and was noted to have a prolong hospital course (6/14-11/16). During that time he has multiple infections and now remains on suppressive antibiotics. Underwent SMA stent with Vascular in 10/2021, now tolerating PO diet, perc dawn drain placement and multiple GI bleeds. Ultimately was trach and discharged to rehab to get stronger. Patient returned from rehab for trach decannulation, which was removed on 12/2. Now presented from [facility] on 12/13 with AMS, hypotensive, tachycardic found to be COVID (+). Pt is lethargic and mumbling words.     HOSPITAL COURSE:  Pt initially required ICU admission and pressor support, presumably secondary to septic shock in the setting of his covid infection, but he subsequently was titrated off of his vasopressors and was downgraded to the floors. Found to have serratia bacteremia with unclear etiology and started on cefepime, which was later changed to bactrim. During the course of bactrim, pt spiked a fever therefore, cefepime was resumed. Due to his COVID, pt received remdesivir + dexamethasone and regeneron. Pt tested negative on 1/3/22. Pt was also followed by heart failure due to his LVAD. He was maintained on metoprolol 50mg QD, spironolactone 25mg QD, and monitored on tele. Pt will be discharged on an oral quinolone for an additional 2 weeks for his bacteremia. He will also continue his oral vanc for 2 weeks past his quinolone course for h/o C.diff. Pt briefly had an episode of aspiration pneumonitis during hospitalization although pt stable on RA. A MBS exam was performed by the S&S which the pt passed and was resumed on his regular diet. Pt deemed safe for discharge to [facility].     65M [facility] ACC/AHA stage D HF due to NICM HM2 LVAD , TV annuloplasty ring 9/12/17 as destination therapy due to severe peripheral artery disease with significant stenosis  SIADH, Depression, CKD-3 with hyperkalemia, past E. coli UTIs, driveline drainage (1/7/21) and COVID-19 (back in April 2020). Recurrent syncope post LVAD s/p ILR, and chronic abdominal pain and was noted to have a prolong hospital course (6/14-11/16). During that time he has multiple infections and now remains on suppressive antibiotics. Underwent SMA stent with Vascular in 10/2021, now tolerating PO diet, perc dawn drain placement and multiple GI bleeds. Ultimately was trach and discharged to rehab to get stronger. Patient returned from rehab for trach decannulation, which was removed on 12/2. Now presented from [facility] on 12/13 with AMS, hypotensive, tachycardic found to be COVID (+). Pt is lethargic and mumbling words.     HOSPITAL COURSE:  Pt initially required ICU admission and pressor support, presumably secondary to septic shock in the setting of his covid infection, but he subsequently was titrated off of his vasopressors and was downgraded to the floors. Found to have serratia bacteremia with unclear etiology and started on cefepime, which was later changed to bactrim as chronic suppression therapy. However, during the course of bactrim, pt spiked a fever therefore, cefepime was resumed at the time. For his COVID, pt received remdesivir + dexamethasone and regeneron. Pt tested negative on 1/3/22. Pt was also followed by heart failure due to his LVAD. He was maintained on metoprolol 50mg QD, spironolactone 25mg QD, and monitored on tele. Pt will be discharged on Levaquin as his life-long abx suppression therapy as approved by ID, Dr. Prater and HF team, Dr. Wilkerson. He will also continue his oral vanc 125mg BID for c-diff ppx chronically. Pt briefly had an episode of aspiration pneumonitis during hospitalization although pt stable on RA. A MBS exam was performed by the S&S which the pt passed and was resumed on his regular diet. Pt deemed safe for discharge to [facility].     65M [facility] ACC/AHA stage D HF due to NICM HM2 LVAD , TV annuloplasty ring 9/12/17 as destination therapy due to severe peripheral artery disease with significant stenosis  SIADH, Depression, CKD-3 with hyperkalemia, past E. coli UTIs, driveline drainage (1/7/21) and COVID-19 (back in April 2020). Recurrent syncope post LVAD s/p ILR, and chronic abdominal pain and was noted to have a prolong hospital course (6/14-11/16). During that time he has multiple infections and now remains on suppressive antibiotics. Underwent SMA stent with Vascular in 10/2021, now tolerating PO diet, perc dawn drain placement and multiple GI bleeds. Ultimately was trach and discharged to rehab to get stronger. Patient returned from rehab for trach decannulation, which was removed on 12/2. Now presented from [facility] on 12/13 with AMS, hypotensive, tachycardic found to be COVID (+). Pt is lethargic and mumbling words.     HOSPITAL COURSE:  Pt initially required ICU admission and pressor support, presumably secondary to septic shock in the setting of his covid infection, but he subsequently was titrated off of his vasopressors and was downgraded to the floors. Found to have serratia bacteremia with unclear etiology and started on cefepime, which was later changed to bactrim as chronic suppression therapy. However, during the course of bactrim, pt spiked a fever therefore, cefepime was resumed at the time. For his COVID, pt received remdesivir + dexamethasone and regeneron. Pt tested negative on 1/3/22. Pt was also followed by heart failure due to his LVAD. He was maintained on metoprolol 50mg QD, spironolactone 25mg QD, and monitored on tele. Pt will be discharged on Levaquin as his life-long abx suppression therapy as approved by ID, Dr. Prater and HF team, Dr. Wilkerson. He will also continue his oral vanc 125mg BID for c-diff ppx chronically. Pt briefly had an episode of aspiration pneumonitis during hospitalization although pt stable on RA. A MBS exam was performed by the S&S which the pt passed and was resumed on his regular diet. Pt deemed safe for discharge to [facility]. 65M [facility] ACC/AHA stage D HF due to NICM HM2 LVAD , TV annuloplasty ring 9/12/17 as destination therapy due to severe peripheral artery disease with significant stenosis  SIADH, Depression, CKD-3 with hyperkalemia, past E. coli UTIs, driveline drainage (1/7/21) and COVID-19 (back in April 2020). Recurrent syncope post LVAD s/p ILR, and chronic abdominal pain and was noted to have a prolong hospital course (6/14-11/16). During that time he has multiple infections and now remains on suppressive antibiotics. Underwent SMA stent with Vascular in 10/2021, now tolerating PO diet, perc dawn drain placement and multiple GI bleeds. Ultimately was trach and discharged to rehab to get stronger. Patient returned from rehab for trach decannulation, which was removed on 12/2. Now presented from [facility] on 12/13 with AMS, hypotensive, tachycardic found to be COVID (+). Pt is lethargic and mumbling words.     HOSPITAL COURSE:  Pt initially required ICU admission and pressor support, presumably secondary to septic shock in the setting of his covid infection, but he subsequently was titrated off of his vasopressors and was downgraded to the floors. Found to have serratia bacteremia with unclear etiology and started on cefepime, which was later changed to bactrim as chronic suppression therapy. However, during the course of bactrim, pt spiked a fever therefore, cefepime was resumed at the time. For his COVID, pt received remdesivir + dexamethasone and regeneron. Pt tested negative on 1/3/22. Pt was also followed by heart failure due to his LVAD. He was maintained on metoprolol 50mg QD, spironolactone 25mg QD, and monitored on tele. Pt will be discharged on Levaquin as his life-long abx suppression therapy as approved by ID, Dr. Prater and HF team, Dr. Wilkerson. He will also continue his oral vanc 125mg BID for c-diff ppx chronically. Pt briefly had an episode of aspiration pneumonitis during hospitalization although pt stable on RA. A MBS exam was performed by the S&S which the pt passed and was resumed on his regular diet. Pt deemed safe for discharge to [facility]. 65M [facility] ACC/AHA stage D HF due to NICM HM2 LVAD , TV annuloplasty ring 9/12/17 as destination therapy due to severe peripheral artery disease with significant stenosis  SIADH, Depression, CKD-3 with hyperkalemia, past E. coli UTIs, driveline drainage (1/7/21) and COVID-19 (back in April 2020). Recurrent syncope post LVAD s/p ILR, and chronic abdominal pain and was noted to have a prolong hospital course (6/14-11/16). During that time he has multiple infections and now remains on suppressive antibiotics. Underwent SMA stent with Vascular in 10/2021, now tolerating PO diet, perc dawn drain placement and multiple GI bleeds. Ultimately was trach and discharged to rehab to get stronger. Patient returned from rehab for trach decannulation, which was removed on 12/2. Now presented from [facility] on 12/13 with AMS, hypotensive, tachycardic found to be COVID (+). Pt is lethargic and mumbling words.     HOSPITAL COURSE:  Pt initially required ICU admission and pressor support, presumably secondary to septic shock in the setting of his covid infection, but he subsequently was titrated off of his vasopressors and was downgraded to the floors. Found to have serratia bacteremia with unclear etiology and started on cefepime, which was later changed to bactrim as chronic suppression therapy. However, during the course of bactrim, pt spiked a fever therefore, cefepime was resumed at the time. For his COVID, pt received remdesivir + dexamethasone and regeneron. Pt tested negative on 1/3/22. Pt was also followed by heart failure due to his LVAD. He was maintained on metoprolol 50mg QD, spironolactone 25mg QD, and monitored on tele. Pt will be discharged on Levaquin as his life-long abx suppression therapy as approved by ID, Dr. Prater and HF team, Dr. Wilkerson. He will also continue his oral vanc 125mg BID for c-diff ppx chronically. Pt briefly had an episode of aspiration pneumonitis during hospitalization although pt stable on RA. A MBS exam was performed by the S&S which the pt passed and was resumed on his regular diet. Patient also had a protein gap without overt signs of MM which he will have to follow up as an outpatient for further workup. Pt deemed safe for discharge to [facility]. 65M [facility] ACC/AHA stage D HF due to NICM HM2 LVAD , TV annuloplasty ring 9/12/17 as destination therapy due to severe peripheral artery disease with significant stenosis  SIADH, Depression, CKD-3 with hyperkalemia, past E. coli UTIs, driveline drainage (1/7/21) and COVID-19 (back in April 2020). Recurrent syncope post LVAD s/p ILR, and chronic abdominal pain and was noted to have a prolong hospital course (6/14-11/16). During that time he has multiple infections and now remains on suppressive antibiotics. Underwent SMA stent with Vascular in 10/2021, now tolerating PO diet, perc dawn drain placement and multiple GI bleeds. Ultimately was trach and discharged to rehab to get stronger. Patient returned from rehab for trach decannulation, which was removed on 12/2. Now presented from [facility] on 12/13 with AMS, hypotensive, tachycardic found to be COVID (+). Pt is lethargic and mumbling words.     HOSPITAL COURSE:  Pt initially required ICU admission and pressor support, presumably secondary to septic shock in the setting of his covid infection, but he subsequently was titrated off of his vasopressors and was downgraded to the floors. Found to have serratia bacteremia with unclear etiology and started on cefepime, which was later changed to bactrim as chronic suppression therapy. CT scan shows splenic abscess, likely the source. ABx switched to IV Zosyn. IR consulted for splenic abscess drainage; however, risks of draining outweigh benefits, and given multiple medical chronic issues, patient wouldn't be a good candidate.       However, during the course of bactrim, pt spiked a fever therefore, cefepime was resumed at the time. For his COVID, pt received remdesivir + dexamethasone and regeneron. Pt tested negative on 1/3/22. Pt was also followed by heart failure due to his LVAD. Cardiac meds adjusted. Pt will be discharged on Levaquin as treatment/suppressive therapy as approved by ID, Dr. Prater and HF team, Dr. Wilkerson. He will also continue his oral vanc 125mg BID for c-diff ppx chronically. Pt briefly had an episode of aspiration pneumonitis during hospitalization although pt stable on RA. A MBS exam was performed by the S&S which the pt passed and was resumed on his regular diet. Patient also had a protein gap without overt signs of MM which he will have to follow up as an outpatient for further workup. Remains stable for DC to [facility], who will then transition patient to long term care.  Palliative care has signed off- Patient more compliant with medications since last palliative meeting on 1/26. Remains full code and does not want LVAD deactivated.

## 2021-12-24 NOTE — DISCHARGE NOTE PROVIDER - PROVIDER TOKENS
PROVIDER:[TOKEN:[[id number]]] PROVIDER:[TOKEN:[[id number]],FOLLOWUP:[2 weeks]],PROVIDER:[TOKEN:[[id number]],FOLLOWUP:[1 month]] PROVIDER:[TOKEN:[[id number]],FOLLOWUP:[2 weeks]],PROVIDER:[TOKEN:[[id number]],FOLLOWUP:[1 month]],PROVIDER:[TOKEN:[[id number]],FOLLOWUP:[2 weeks]]

## 2021-12-24 NOTE — DISCHARGE NOTE PROVIDER - NSDCMRMEDTOKEN_GEN_ALL_CORE_FT
amiodarone 200 mg oral tablet: 1 tab(s) orally once a day   chlorhexidine 2% topical pad: 1 application topically    acetaminophen 325 mg oral tablet: 1-2 tab(s) orally every 4 hours, As Needed  amiodarone 200 mg oral tablet: 1 tab(s) orally once a day   aspirin 81 mg oral tablet: 1 tab(s) orally once a day  ciprofloxacin 500 mg oral tablet: 1 tab(s) orally every 12 hours  Colace 100 mg oral capsule: 1 cap(s) orally 2 times a day, As Needed  finasteride 5 mg oral tablet: 1 tab(s) orally once a day  folic acid 1 mg oral tablet: 1 tab(s) orally once a day  hydrALAZINE 25 mg oral tablet: 1 tab(s) orally 3 times a day  lactobacillus acidophilus oral tablet: 1 tab(s) orally once a day  magnesium oxide 400 mg oral tablet: 1 tab(s) orally 2 times a day  methIMAzole 10 mg oral tablet: 1 tab(s) orally once a day  metoclopramide 5 mg oral tablet: 1 tab(s) orally  30 minutes before meals and at bedtime  metoprolol succinate 50 mg oral tablet, extended release: 1 tab(s) orally once a day  MiraLax oral powder for reconstitution: Mix 1 capfull in 8 oz of water and drink at bedtime as needed for constipation  mirtazapine 7.5 mg oral tablet: 1 tab(s) orally once a day (at bedtime)  multivitamin: 1 tab(s) orally once a day  pantoprazole 40 mg oral delayed release tablet: 1 tab(s) orally once a day  Senna 8.6 mg oral tablet: 2 tab(s) orally once a day (at bedtime)  sertraline 100 mg oral tablet: 1 tab(s) orally once a day  simethicone 80 mg oral tablet: 1 tab(s) orally every 8 hours  spironolactone 25 mg oral tablet: 1 tab(s) orally once a day  triamcinolone 0.025% topical cream: Apply topically to affected area 2-3 times a day  Vitamin B12 50 mcg oral tablet: 1 tab(s) orally once a day   acetaminophen 325 mg oral tablet: 1-2 tab(s) orally every 4 hours, As Needed  amiodarone 200 mg oral tablet: 1 tab(s) orally once a day   aspirin 81 mg oral tablet: 1 tab(s) orally once a day  cholecalciferol oral tablet: 1000 unit(s) orally once a day  ciprofloxacin 500 mg oral tablet: 1 tab(s) orally every 12 hours  Colace 100 mg oral capsule: 1 cap(s) orally 2 times a day, As Needed  finasteride 5 mg oral tablet: 1 tab(s) orally once a day  folic acid 1 mg oral tablet: 1 tab(s) orally once a day  hydrALAZINE 25 mg oral tablet: 1 tab(s) orally 3 times a day  lactobacillus acidophilus oral tablet: 1 tab(s) orally once a day  magnesium oxide 400 mg oral tablet: 1 tab(s) orally 2 times a day  methIMAzole 10 mg oral tablet: 1 tab(s) orally once a day  metoclopramide 5 mg oral tablet: 1 tab(s) orally  30 minutes before meals and at bedtime  metoprolol succinate 50 mg oral tablet, extended release: 1 tab(s) orally once a day  MiraLax oral powder for reconstitution: Mix 1 capfull in 8 oz of water and drink at bedtime as needed for constipation  mirtazapine 7.5 mg oral tablet: 1 tab(s) orally once a day (at bedtime)  multivitamin: 1 tab(s) orally once a day  pantoprazole 40 mg oral delayed release tablet: 1 tab(s) orally once a day  Senna 8.6 mg oral tablet: 2 tab(s) orally once a day (at bedtime)  sertraline 100 mg oral tablet: 1 tab(s) orally once a day  simethicone 80 mg oral tablet: 1 tab(s) orally every 8 hours  spironolactone 25 mg oral tablet: 1 tab(s) orally once a day  triamcinolone 0.025% topical cream: Apply topically to affected area 2-3 times a day  Vitamin B12 50 mcg oral tablet: 1 tab(s) orally once a day   aspirin 81 mg oral tablet: 1 tab(s) orally once a day  baclofen 5 mg oral tablet: 1 tab(s) orally every 8 hours  cholecalciferol oral tablet: 1000 unit(s) orally once a day  Colace 100 mg oral capsule: 1 cap(s) orally 2 times a day, As Needed  finasteride 5 mg oral tablet: 1 tab(s) orally once a day  folic acid 1 mg oral tablet: 1 tab(s) orally once a day  lactobacillus acidophilus oral tablet: 1 tab(s) orally once a day  magnesium oxide 400 mg oral tablet: 1 tab(s) orally 2 times a day  methIMAzole 10 mg oral tablet: 1 tab(s) orally once a day  metoclopramide 5 mg oral tablet: 1 tab(s) orally  30 minutes before meals and at bedtime  metoprolol succinate 50 mg oral tablet, extended release: 1 tab(s) orally once a day  MiraLax oral powder for reconstitution: Mix 1 capfull in 8 oz of water and drink at bedtime as needed for constipation  mirtazapine 7.5 mg oral tablet: 1 tab(s) orally once a day (at bedtime)  multivitamin: 1 tab(s) orally once a day  pantoprazole 40 mg oral delayed release tablet: 1 tab(s) orally once a day  Senna 8.6 mg oral tablet: 2 tab(s) orally once a day (at bedtime)  sertraline 100 mg oral tablet: 1 tab(s) orally once a day  simethicone 80 mg oral tablet: 1 tab(s) orally every 8 hours  spironolactone 25 mg oral tablet: 1 tab(s) orally once a day  triamcinolone 0.025% topical cream: Apply topically to affected area 2-3 times a day  Vitamin B12 50 mcg oral tablet: 1 tab(s) orally once a day   baclofen 5 mg oral tablet: 1 tab(s) orally every 8 hours  cholecalciferol oral tablet: 1000 unit(s) orally once a day  Colace 100 mg oral capsule: 1 cap(s) orally 2 times a day, As Needed  finasteride 5 mg oral tablet: 1 tab(s) orally once a day  folic acid 1 mg oral tablet: 1 tab(s) orally once a day  lactobacillus acidophilus oral tablet: 1 tab(s) orally once a day  methIMAzole 10 mg oral tablet: 1 tab(s) orally once a day  metoprolol succinate 50 mg oral tablet, extended release: 1 tab(s) orally once a day  MiraLax oral powder for reconstitution: Mix 1 capfull in 8 oz of water and drink at bedtime as needed for constipation  mirtazapine 7.5 mg oral tablet: 1 tab(s) orally once a day (at bedtime)  multivitamin: 1 tab(s) orally once a day  pantoprazole 40 mg oral delayed release tablet: 1 tab(s) orally once a day  Senna 8.6 mg oral tablet: 2 tab(s) orally once a day (at bedtime)  sertraline 100 mg oral tablet: 1 tab(s) orally once a day  simethicone 80 mg oral tablet: 1 tab(s) orally every 8 hours  spironolactone 25 mg oral tablet: 1 tab(s) orally once a day  triamcinolone 0.025% topical cream: Apply topically to affected area 2-3 times a day  Vitamin B12 50 mcg oral tablet: 1 tab(s) orally once a day   aspirin 81 mg oral tablet, chewable: 1 tab(s) orally once a day   baclofen 5 mg oral tablet: 1 tab(s) orally every 8 hours  cholecalciferol oral tablet: 1000 unit(s) orally once a day  Colace 100 mg oral capsule: 1 cap(s) orally 2 times a day, As Needed  finasteride 5 mg oral tablet: 1 tab(s) orally once a day  folic acid 1 mg oral tablet: 1 tab(s) orally once a day  lactobacillus acidophilus oral tablet: 1 tab(s) orally once a day  levoFLOXacin 500 mg oral tablet: 1 tab(s) orally once a day   methIMAzole 10 mg oral tablet: 1 tab(s) orally once a day  metoclopramide 5 mg/5 mL oral syrup: 5 milliliter(s) orally 4 times a day (before meals and at bedtime)  metoprolol succinate 50 mg oral tablet, extended release: 1 tab(s) orally once a day  MiraLax oral powder for reconstitution: Mix 1 capfull in 8 oz of water and drink at bedtime as needed for constipation  mirtazapine 7.5 mg oral tablet: 1 tab(s) orally once a day (at bedtime)  multivitamin: 1 tab(s) orally once a day  pantoprazole 40 mg oral delayed release tablet: 1 tab(s) orally once a day  Senna 8.6 mg oral tablet: 2 tab(s) orally once a day (at bedtime)  sertraline 100 mg oral tablet: 1 tab(s) orally once a day  simethicone 80 mg oral tablet: 1 tab(s) orally every 8 hours  spironolactone 25 mg oral tablet: 1 tab(s) orally once a day  triamcinolone 0.025% topical cream: Apply topically to affected area 2-3 times a day  vancomycin 125 mg oral capsule: 1 cap(s) orally 2 times a day   Vitamin B12 50 mcg oral tablet: 1 tab(s) orally once a day

## 2021-12-24 NOTE — DISCHARGE NOTE PROVIDER - NSDCCPCAREPLAN_GEN_ALL_CORE_FT
PRINCIPAL DISCHARGE DIAGNOSIS  Diagnosis: 2019 novel coronavirus disease (COVID-19)  Assessment and Plan of Treatment: You were sent to the hospital for altered mental status, low blood pressure, and fast heart rate. You initially were sent to the ICU for pressure support, in the setting of septic shock from COVID. You received a course of remdesivir, dexamethasone, and regeneron. You were transferred to the medicine floors and found to have bacteria in your blood. You were started on antibiotics, which you will continue at home. Please make an appointment now to see your primary care doctor within 1-2 weeks. If you have any new or recurrent symptoms, please call your doctor or go to the ED.      SECONDARY DISCHARGE DIAGNOSES  Diagnosis: LVAD (left ventricular assist device) present  Assessment and Plan of Treatment: The heart failure team managed your care while you were in the hospital. You will go home on metoprolol and spirinolactone. You do not need anticoagulation at this time.     PRINCIPAL DISCHARGE DIAGNOSIS  Diagnosis: 2019 novel coronavirus disease (COVID-19)  Assessment and Plan of Treatment: You were sent to the hospital for altered mental status, low blood pressure, and fast heart rate. You initially were sent to the ICU for pressure support, in the setting of septic shock from COVID. You received a course of remdesivir, dexamethasone, and regeneron. You were transferred to the medicine floors and found to have bacteria in your blood. You were started on antibiotics, which you will continue at home for 2 more weeks. Please make an appointment now to see your primary care doctor within 1-2 weeks. If you have any new or recurrent symptoms, please call your doctor or go to the ED.      SECONDARY DISCHARGE DIAGNOSES  Diagnosis: LVAD (left ventricular assist device) present  Assessment and Plan of Treatment: The heart failure team managed your care while you were in the hospital. You will go home on metoprolol and spirinolactone. You do not need anticoagulation at this time.     PRINCIPAL DISCHARGE DIAGNOSIS  Diagnosis: 2019 novel coronavirus disease (COVID-19)  Assessment and Plan of Treatment: You were sent to the hospital for altered mental status, low blood pressure, and fast heart rate. You initially were sent to the ICU for pressure support, in the setting of septic shock from COVID. You received a course of remdesivir, dexamethasone, and regeneron. You were transferred to the medicine floors and found to have bacteria in your blood. You were started on antibiotics, which you will continue at home. You will be taking Levaquin 500mg x1 / day as your life-long antibiotics suppression therapy. In addition, you will take vancomycin 125mg x2/day to suppress any C. diff infection that may occur due to life-long antibiotics therapy. Please follow-up with the heart failure team within 1-2 weeks. If you have any new or recurrent symptoms, please call your doctor or go to the ED.      SECONDARY DISCHARGE DIAGNOSES  Diagnosis: LVAD (left ventricular assist device) present  Assessment and Plan of Treatment: The heart failure team followed you closely while inpatient. They monitored your vitals daily in addition to bloodwork to ensure that your machine is working properly. Due to bactermia in your blood, you will continue on life-long antibiotic suppression therapy with Levaquin as noted above. This will help control your infection and ensure your LVAD works properly. Please follow-up with the heart failure team within 1-2 weeks. If you notice any chest pain, palpitations, and/or shortness of breath, please visit the nearest emergency department.     PRINCIPAL DISCHARGE DIAGNOSIS  Diagnosis: 2019 novel coronavirus disease (COVID-19)  Assessment and Plan of Treatment: You were sent to the hospital for altered mental status, low blood pressure, and fast heart rate. You initially were sent to the ICU for pressure support, in the setting of septic shock from COVID. You received a course of remdesivir, dexamethasone, and regeneron. You were transferred to the medicine floors and found to have bacteria in your blood. You were started on antibiotics, which you will continue at home. You will be taking Levaquin 500mg x1 / day as your life-long antibiotics suppression therapy. In addition, you will take vancomycin 125mg x2/day to suppress any C. diff infection that may occur due to life-long antibiotics therapy. Please follow-up with the heart failure team within 1-2 weeks. If you have any new or recurrent symptoms, please call your doctor or go to the ED.      SECONDARY DISCHARGE DIAGNOSES  Diagnosis: Bacteremia  Assessment and Plan of Treatment: Resolved. Likely from splenic abscess. Please continue antibiotics as directed    Diagnosis: LVAD (left ventricular assist device) present  Assessment and Plan of Treatment: The heart failure team followed you closely while inpatient. They monitored your vitals daily in addition to bloodwork to ensure that your machine is working properly. Due to bactermia in your blood, you will continue on life-long antibiotic suppression therapy with Levaquin as noted above. This will help control your infection and ensure your LVAD works properly. Please follow-up with the heart failure team within 1-2 weeks. If you notice any chest pain, palpitations, and/or shortness of breath, please visit the nearest emergency department.    Diagnosis: Depressed mood  Assessment and Plan of Treatment: SEEK IMMEDIATE CARE IF  You have thoughts about hurting yourself or others.  You lose touch with reality (have psychotic symptoms).  You are taking medicine for depression and have a serious side effect      Diagnosis: H/O Clostridium difficile infection  Assessment and Plan of Treatment: If you no longer have diarrhea you do not have to do anything special.  Good hand washing is always important.  If you still have diarrhea, you and your family members should follow these guidelines.  Wash your hands before you eat and prepare food.  Wash your hands well after you use toilet.  Wash your hands well before and after you care for a person with C. Difficile.  Wear disposable gloves if you must handle stool.  Clean the bathroom daily with disinfectant  (diluted bleach).  Put disposable waste, like diapers or other such items, into plastic bags.  Tie the bags securely, and throw them out with the regular trash.  If clothes are heavly soiled with stool, wash them separately in detergent and bleach.  Clothes not soiled with stool can be washed with other clothing.      Diagnosis: Hyponatremia  Assessment and Plan of Treatment: HOME CARE INSTRUCTIONS  Only take medicines as directed by your caregiver. Many medicines can make hyponatremia worse. Discuss all your medicines with your caregiver.  Carefully follow any recommended diet, including any fluid restrictions.  You may be asked to repeat lab tests. Follow these directions.  Avoid alcohol and recreational drugs.  SEEK MEDICAL CARE IF:  You develop worsening nausea, fatigue, headache, confusion, or weakness.  Your original hyponatremia symptoms return.  You have problems following the recommended diet.   SEEK IMMEDIATE MEDICAL CARE IF:  You have a seizure.  You faint.  You have ongoing diarrhea or vomiting      Diagnosis: Splenic abscess  Assessment and Plan of Treatment: CT scan shows splenic abscess, likely the source. You received IV Zosyn. IR consulted for splenic abscess drainage; however, risks of draining outweigh benefits. Continue antibiotics as directed

## 2021-12-24 NOTE — DISCHARGE NOTE PROVIDER - CARE PROVIDERS DIRECT ADDRESSES
[email or web address]

## 2021-12-24 NOTE — PROGRESS NOTE ADULT - SUBJECTIVE AND OBJECTIVE BOX
**************************  Bre El  PGY-1 Internal Medicine  [phone]  **************************    Patient is a 66y old  Male who presents with a chief complaint of COVID (+) with AMS and hypotension (23 Dec 2021 12:42)      SUBJECTIVE / OVERNIGHT EVENTS:  Overnight, patient had a headache that resolved with tylenol. This morning with a headache again, exacerbated by sound and light. No events on telemetry, NSR 80-90s. Otherwise, no complaints.    MEDICATIONS  (STANDING):  aspirin  chewable 81 milliGRAM(s) Oral daily  baclofen 5 milliGRAM(s) Oral every 8 hours  cefepime   IVPB 2000 milliGRAM(s) IV Intermittent every 8 hours  chlorhexidine 2% Cloths 1 Application(s) Topical <User Schedule>  dextrose 50% Injectable 50 milliLiter(s) IV Push every 15 minutes  dextrose 50% Injectable 25 milliLiter(s) IV Push every 15 minutes  enoxaparin Injectable 30 milliGRAM(s) SubCutaneous daily  insulin lispro (ADMELOG) corrective regimen sliding scale   SubCutaneous three times a day before meals  insulin lispro (ADMELOG) corrective regimen sliding scale   SubCutaneous at bedtime  magnesium sulfate  IVPB 2 Gram(s) IV Intermittent once  methimazole 10 milliGRAM(s) Oral daily  metoclopramide Injectable 10 milliGRAM(s) IV Push every 8 hours  metoprolol succinate ER 50 milliGRAM(s) Oral daily  mirtazapine 7.5 milliGRAM(s) Oral at bedtime  multivitamin 1 Tablet(s) Oral daily  pantoprazole  Injectable 40 milliGRAM(s) IV Push every 12 hours  potassium phosphate / sodium phosphate Tablet (K-PHOS No. 2) 1 Tablet(s) Oral two times a day  sertraline 100 milliGRAM(s) Oral daily  sodium chloride 0.9% lock flush 3 milliLiter(s) IV Push every 8 hours  sodium chloride 0.9%. 500 milliLiter(s) (100 mL/Hr) IV Continuous <Continuous>  spironolactone 25 milliGRAM(s) Oral daily  vancomycin    Solution 125 milliGRAM(s) Oral every 12 hours    MEDICATIONS  (PRN):  acetaminophen     Tablet .. 650 milliGRAM(s) Oral every 6 hours PRN Temp greater or equal to 38C (100.4F), Mild Pain (1 - 3), Moderate Pain (4 - 6)      CAPILLARY BLOOD GLUCOSE    POCT Blood Glucose.: 175 mg/dL (23 Dec 2021 21:38)  POCT Blood Glucose.: 127 mg/dL (23 Dec 2021 16:51)  POCT Blood Glucose.: 199 mg/dL (23 Dec 2021 11:55)  POCT Blood Glucose.: 111 mg/dL (23 Dec 2021 08:24)    I&O's Summary    23 Dec 2021 07:01  -  24 Dec 2021 07:00  --------------------------------------------------------  IN: 600 mL / OUT: 2040 mL / NET: -1440 mL    PHYSICAL EXAM:  Vital Signs Last 24 Hrs  T(C): 36.2 (24 Dec 2021 05:00), Max: 36.7 (23 Dec 2021 12:05)  T(F): 97.2 (24 Dec 2021 05:00), Max: 98 (23 Dec 2021 12:05)  HR: 89 (24 Dec 2021 05:00) (89 - 108)  BP: 103/72 (24 Dec 2021 02:17) (86/62 - 103/72)  BP(mean): 76 (24 Dec 2021 06:29) (70 - 84)  RR: 19 (24 Dec 2021 08:00) (18 - 19)  SpO2: 100% (24 Dec 2021 05:00) (98% - 100%)    CONSTITUTIONAL: NAD, well-developed; trach site bandaged c/d/i, clean underneath with clear mucus  RESPIRATORY: Normal respiratory effort; lungs are clear to auscultation bilaterally; on RA  CARDIOVASCULAR: LVAD heard; No lower extremity edema; Peripheral pulses are 2+ bilaterally  ABDOMEN: Nontender to palpation, normoactive bowel sounds, no rebound/guarding; No hepatosplenomegaly  MUSCLOSKELETAL: no clubbing or cyanosis of digits; no joint swelling or tenderness to palpation  PSYCH: A+O to person, place, and time; affect appropriate    LABS:                        8.9    13.94 )-----------( 196      ( 24 Dec 2021 06:10 )             28.6     12-24    128<L>  |  96  |  14  ----------------------------<  113<H>  4.6   |  19<L>  |  0.59    Ca    9.3      24 Dec 2021 06:10  Phos  3.1     12-24  Mg     1.9     12-24    TPro  8.2  /  Alb  3.1<L>  /  TBili  0.3  /  DBili  x   /  AST  17  /  ALT  14  /  AlkPhos  141<H>  12-24

## 2021-12-24 NOTE — DISCHARGE NOTE PROVIDER - NSFOLLOWUPCLINICS_GEN_ALL_ED_FT
[facility]  Hematology/Oncology  [address] [id number]  Phone: [phone]  Fax:   Follow Up Time: 2 weeks

## 2021-12-25 NOTE — PROGRESS NOTE ADULT - ATTENDING COMMENTS
66 yo man with a history of Stage D HF s/p VAD placement ,history of COVID infection in April 2020, history of a prolonged hospitalization in 2021 with recurrent sepsis, pneumonia, intubated/trach x2 cycles , chronic gall bladder disease s/p perc dawn, SMA ischemia requiring a stent placement, cachexia who was discharged to rehab but presented from [facility] on 12/13 with AMS, hypotension, tachycardia found to be COVID (+) + serratia bacteremia. Initially admitted to ICU requiring pressor support, now on floors. Stable pending [facility] acceptance.     Serratia Bacteremia- On Cefepime since 12/13 will transition to 2 weeks of quinolone on d/c  Leukocytosis unclear etiology, possibly reactive due to dexamethasone, however last dose 12/22, currently 11s- CTM  COVID19 s/p monoclonal ab, Remdesivir and Dexamethasone currently on room air, no longer requires precautions - will discuss vaccination prior to discharge  HF: intermittent episodes of hypotension. Given poor po intake likely hypovolemic - fluids cautiously prn  Intractable Hiccups - on Baclofen  Amiodarone Toxicity - on Methimazole  Full code - palliative following regarding GOC  C diff - on PO Vancomycin  DC planning - still COVID positive, will reswab this weekend

## 2021-12-25 NOTE — PROGRESS NOTE ADULT - PROBLEM SELECTOR PLAN 1
- COVID PCR + 12/13. Recent CXR clear, respiratory status stable on room air   - s/p monoclonal antibodies Regeneron   - s/p 5 day course of Remdesivir  - s/p 10 day course of Dexamethasone  - f/u repeat COVID PCR 12/26

## 2021-12-25 NOTE — PROGRESS NOTE ADULT - PROBLEM SELECTOR PLAN 3
- hydralazine discontinued  - spironolactone 25 mg QD  - metoprolol succinate increased to 50 mg QD  - replete Mg to 2-2.5, K 4-4.5  - Daily PT  - Encourage oral intake and nutrition - hydralazine discontinued  - spironolactone 25 mg QD  - metoprolol succinate increased to 50 mg QD  - replete Mg to 2-2.5, K 4-4.5  - 28 seconds of WCT vs SVT w/ aberrancy (?) 12/24 at 15:40:46; pt asymptomatic. Electrolytes this AM repleted to goal.   - Daily PT  - Encourage oral intake and nutrition

## 2021-12-25 NOTE — PROGRESS NOTE ADULT - ASSESSMENT
64 yo man with a history of VAD placement history of COVID infection in April 2020, history of a prolonged hospitalization in 2021 with recurrent sepsis, pneumonia, intubated/trach x2 cycles , chronic gall bladder disease s/p perc dawn, SMA ischemia requiring a stent placement, cachexia who was discharged to rehab but is presented from [facility] on 12/13 with AMS, hypotensive, tachycardic found to be COVID (+). Pt lethargic and mumbling words required ICU admission and pressor support now titrated off and downgraded to the floor. Pending dispo to [facility].

## 2021-12-25 NOTE — PROGRESS NOTE ADULT - SUBJECTIVE AND OBJECTIVE BOX
PROGRESS NOTE:   Authored by Rosalia Barone MD  Pager: [facility] [phone]; [facility] [phone]    Patient is a 66y old  Male who presents with a chief complaint of COVID (+) with AMS and hypotension (24 Dec 2021 09:09)      SUBJECTIVE / OVERNIGHT EVENTS:  NAEON. Denies CP, SOB, subjective f/c, n/v.     All other review of systems is negative unless indicated above.    MEDICATIONS  (STANDING):  aspirin  chewable 81 milliGRAM(s) Oral daily  baclofen 5 milliGRAM(s) Oral every 8 hours  cefepime   IVPB 2000 milliGRAM(s) IV Intermittent every 8 hours  chlorhexidine 2% Cloths 1 Application(s) Topical <User Schedule>  dextrose 50% Injectable 50 milliLiter(s) IV Push every 15 minutes  dextrose 50% Injectable 25 milliLiter(s) IV Push every 15 minutes  enoxaparin Injectable 30 milliGRAM(s) SubCutaneous daily  insulin lispro (ADMELOG) corrective regimen sliding scale   SubCutaneous three times a day before meals  insulin lispro (ADMELOG) corrective regimen sliding scale   SubCutaneous at bedtime  methimazole 10 milliGRAM(s) Oral daily  metoclopramide Injectable 10 milliGRAM(s) IV Push every 8 hours  metoprolol succinate ER 50 milliGRAM(s) Oral daily  mirtazapine 7.5 milliGRAM(s) Oral at bedtime  multivitamin 1 Tablet(s) Oral daily  pantoprazole  Injectable 40 milliGRAM(s) IV Push every 12 hours  potassium phosphate / sodium phosphate Tablet (K-PHOS No. 2) 1 Tablet(s) Oral two times a day  sertraline 100 milliGRAM(s) Oral daily  sodium chloride 0.9% lock flush 3 milliLiter(s) IV Push every 8 hours  sodium chloride 0.9%. 500 milliLiter(s) (100 mL/Hr) IV Continuous <Continuous>  spironolactone 25 milliGRAM(s) Oral daily  vancomycin    Solution 125 milliGRAM(s) Oral every 12 hours    MEDICATIONS  (PRN):  acetaminophen     Tablet .. 650 milliGRAM(s) Oral every 6 hours PRN Temp greater or equal to 38C (100.4F), Mild Pain (1 - 3), Moderate Pain (4 - 6)      CAPILLARY BLOOD GLUCOSE      POCT Blood Glucose.: 164 mg/dL (24 Dec 2021 21:37)  POCT Blood Glucose.: 100 mg/dL (24 Dec 2021 16:50)  POCT Blood Glucose.: 202 mg/dL (24 Dec 2021 11:56)  POCT Blood Glucose.: 118 mg/dL (24 Dec 2021 07:58)    I&O's Summary    23 Dec 2021 07:01  -  24 Dec 2021 07:00  --------------------------------------------------------  IN: 600 mL / OUT: 2040 mL / NET: -1440 mL    24 Dec 2021 07:01  -  25 Dec 2021 05:32  --------------------------------------------------------  IN: 1080 mL / OUT: 2000 mL / NET: -920 mL        PHYSICAL EXAM:  Vital Signs Last 24 Hrs  T(C): 36.3 (25 Dec 2021 05:30), Max: 36.9 (24 Dec 2021 15:58)  T(F): 97.4 (25 Dec 2021 05:30), Max: 98.5 (24 Dec 2021 15:58)  HR: 102 (25 Dec 2021 05:30) (92 - 105)  BP: 97/69 (24 Dec 2021 21:00) (82/65 - 97/69)  BP(mean): 72 (25 Dec 2021 01:00) (65 - 79)  RR: 18 (25 Dec 2021 05:30) (16 - 19)  SpO2: 98% (25 Dec 2021 05:30) (96% - 99%)    CONSTITUTIONAL: NAD, well-developed; trach site bandaged c/d/i, clean underneath with clear mucus  RESPIRATORY: Normal respiratory effort; lungs are clear to auscultation bilaterally; on RA  CARDIOVASCULAR: LVAD heard; No lower extremity edema; Peripheral pulses are 2+ bilaterally  ABDOMEN: Nontender to palpation, normoactive bowel sounds, no rebound/guarding; No hepatosplenomegaly  MUSCLOSKELETAL: no clubbing or cyanosis of digits; no joint swelling or tenderness to palpation  PSYCH: A+O to person, place, and time; affect appropriate    LABS:                        8.9    13.94 )-----------( 196      ( 24 Dec 2021 06:10 )             28.6     12-24    128<L>  |  96  |  14  ----------------------------<  113<H>  4.6   |  19<L>  |  0.59    Ca    9.3      24 Dec 2021 06:10  Phos  3.1     12-24  Mg     1.9     12-24    TPro  8.2  /  Alb  3.1<L>  /  TBili  0.3  /  DBili  x   /  AST  17  /  ALT  14  /  AlkPhos  141<H>  12-24                RADIOLOGY & ADDITIONAL TESTS:  Results Reviewed:   Imaging Personally Reviewed:  Electrocardiogram Personally Reviewed:    COORDINATION OF CARE:  Care Discussed with Consultants/Other Providers [Y/N]:  Prior or Outpatient Records Reviewed [Y/N]:   PROGRESS NOTE:   Authored by Rosalia Barone MD  Pager: [facility] [phone]; [facility] [phone]    Patient is a 66y old  Male who presents with a chief complaint of COVID (+) with AMS and hypotension (24 Dec 2021 09:09)      SUBJECTIVE / OVERNIGHT EVENTS:  NAEON.  Telemetry events significant for 28 seconds of WCT vs  SVT w/ aberrancy(?), at ~15:40:46 yesterday. Pt states he did not feel any symptoms during this time. Denies CP, SOB, subjective f/c, n/v.     All other review of systems is negative unless indicated above.    MEDICATIONS  (STANDING):  aspirin  chewable 81 milliGRAM(s) Oral daily  baclofen 5 milliGRAM(s) Oral every 8 hours  cefepime   IVPB 2000 milliGRAM(s) IV Intermittent every 8 hours  chlorhexidine 2% Cloths 1 Application(s) Topical <User Schedule>  dextrose 50% Injectable 50 milliLiter(s) IV Push every 15 minutes  dextrose 50% Injectable 25 milliLiter(s) IV Push every 15 minutes  enoxaparin Injectable 30 milliGRAM(s) SubCutaneous daily  insulin lispro (ADMELOG) corrective regimen sliding scale   SubCutaneous three times a day before meals  insulin lispro (ADMELOG) corrective regimen sliding scale   SubCutaneous at bedtime  methimazole 10 milliGRAM(s) Oral daily  metoclopramide Injectable 10 milliGRAM(s) IV Push every 8 hours  metoprolol succinate ER 50 milliGRAM(s) Oral daily  mirtazapine 7.5 milliGRAM(s) Oral at bedtime  multivitamin 1 Tablet(s) Oral daily  pantoprazole  Injectable 40 milliGRAM(s) IV Push every 12 hours  potassium phosphate / sodium phosphate Tablet (K-PHOS No. 2) 1 Tablet(s) Oral two times a day  sertraline 100 milliGRAM(s) Oral daily  sodium chloride 0.9% lock flush 3 milliLiter(s) IV Push every 8 hours  sodium chloride 0.9%. 500 milliLiter(s) (100 mL/Hr) IV Continuous <Continuous>  spironolactone 25 milliGRAM(s) Oral daily  vancomycin    Solution 125 milliGRAM(s) Oral every 12 hours    MEDICATIONS  (PRN):  acetaminophen     Tablet .. 650 milliGRAM(s) Oral every 6 hours PRN Temp greater or equal to 38C (100.4F), Mild Pain (1 - 3), Moderate Pain (4 - 6)      CAPILLARY BLOOD GLUCOSE      POCT Blood Glucose.: 164 mg/dL (24 Dec 2021 21:37)  POCT Blood Glucose.: 100 mg/dL (24 Dec 2021 16:50)  POCT Blood Glucose.: 202 mg/dL (24 Dec 2021 11:56)  POCT Blood Glucose.: 118 mg/dL (24 Dec 2021 07:58)    I&O's Summary    23 Dec 2021 07:01  -  24 Dec 2021 07:00  --------------------------------------------------------  IN: 600 mL / OUT: 2040 mL / NET: -1440 mL    24 Dec 2021 07:01  -  25 Dec 2021 05:32  --------------------------------------------------------  IN: 1080 mL / OUT: 2000 mL / NET: -920 mL        PHYSICAL EXAM:  Vital Signs Last 24 Hrs  T(C): 36.3 (25 Dec 2021 05:30), Max: 36.9 (24 Dec 2021 15:58)  T(F): 97.4 (25 Dec 2021 05:30), Max: 98.5 (24 Dec 2021 15:58)  HR: 102 (25 Dec 2021 05:30) (92 - 105)  BP: 97/69 (24 Dec 2021 21:00) (82/65 - 97/69)  BP(mean): 72 (25 Dec 2021 01:00) (65 - 79)  RR: 18 (25 Dec 2021 05:30) (16 - 19)  SpO2: 98% (25 Dec 2021 05:30) (96% - 99%)    CONSTITUTIONAL: NAD, well-developed; trach site bandaged c/d/i, clean underneath with clear mucus  RESPIRATORY: Normal respiratory effort; lungs are clear to auscultation bilaterally; on RA  CARDIOVASCULAR: LVAD heard; No lower extremity edema; Peripheral pulses are 2+ bilaterally  ABDOMEN: Nontender to palpation, normoactive bowel sounds, no rebound/guarding; perc dawn drain site c/d/i  MUSCLOSKELETAL: no clubbing or cyanosis of digits; no joint swelling or tenderness to palpation  PSYCH: A+O to person, place, and time; affect appropriate    LABS:                        8.9    13.94 )-----------( 196      ( 24 Dec 2021 06:10 )             28.6     12-24    128<L>  |  96  |  14  ----------------------------<  113<H>  4.6   |  19<L>  |  0.59    Ca    9.3      24 Dec 2021 06:10  Phos  3.1     12-24  Mg     1.9     12-24    TPro  8.2  /  Alb  3.1<L>  /  TBili  0.3  /  DBili  x   /  AST  17  /  ALT  14  /  AlkPhos  141<H>  12-24                RADIOLOGY & ADDITIONAL TESTS:  Results Reviewed:   Imaging Personally Reviewed:  Electrocardiogram Personally Reviewed:    COORDINATION OF CARE:  Care Discussed with Consultants/Other Providers [Y/N]:  Prior or Outpatient Records Reviewed [Y/N]:

## 2021-12-26 NOTE — PROGRESS NOTE ADULT - ASSESSMENT
66 yo man with a history of VAD placement history of COVID infection in April 2020, history of a prolonged hospitalization in 2021 with recurrent sepsis, pneumonia, intubated/trach x2 cycles , chronic gall bladder disease s/p perc dawn, SMA ischemia requiring a stent placement, cachexia who was discharged to rehab but is presented from [facility] on 12/13 with AMS, hypotensive, tachycardic found to be COVID (+). Pt lethargic and mumbling words required ICU admission and pressor support now titrated off and downgraded to the floor. Pending dispo to [facility].

## 2021-12-26 NOTE — PROGRESS NOTE ADULT - SUBJECTIVE AND OBJECTIVE BOX
**************************  Bre El  PGY-1 Internal Medicine  [phone]  **************************    Patient is a 66y old  Male who presents with a chief complaint of COVID (+) with AMS and hypotension (25 Dec 2021 05:32)      SUBJECTIVE / OVERNIGHT EVENTS:  On telemetry, patient has been NSR 80-100s, asymptomatic. Denies headache, all other ROS negative this morning.    MEDICATIONS  (STANDING):  aspirin  chewable 81 milliGRAM(s) Oral daily  baclofen 5 milliGRAM(s) Oral every 8 hours  cefepime   IVPB 2000 milliGRAM(s) IV Intermittent every 8 hours  chlorhexidine 2% Cloths 1 Application(s) Topical <User Schedule>  dextrose 50% Injectable 50 milliLiter(s) IV Push every 15 minutes  dextrose 50% Injectable 25 milliLiter(s) IV Push every 15 minutes  enoxaparin Injectable 30 milliGRAM(s) SubCutaneous daily  insulin lispro (ADMELOG) corrective regimen sliding scale   SubCutaneous three times a day before meals  insulin lispro (ADMELOG) corrective regimen sliding scale   SubCutaneous at bedtime  methimazole 10 milliGRAM(s) Oral daily  metoclopramide Injectable 10 milliGRAM(s) IV Push every 8 hours  metoprolol succinate ER 50 milliGRAM(s) Oral daily  mirtazapine 7.5 milliGRAM(s) Oral at bedtime  multivitamin 1 Tablet(s) Oral daily  pantoprazole  Injectable 40 milliGRAM(s) IV Push every 12 hours  potassium phosphate / sodium phosphate Tablet (K-PHOS No. 2) 1 Tablet(s) Oral two times a day  sertraline 100 milliGRAM(s) Oral daily  sodium chloride 0.9% lock flush 3 milliLiter(s) IV Push every 8 hours  sodium chloride 0.9%. 500 milliLiter(s) (100 mL/Hr) IV Continuous <Continuous>  spironolactone 25 milliGRAM(s) Oral daily  vancomycin    Solution 125 milliGRAM(s) Oral every 12 hours    MEDICATIONS  (PRN):  acetaminophen     Tablet .. 650 milliGRAM(s) Oral every 6 hours PRN Temp greater or equal to 38C (100.4F), Mild Pain (1 - 3), Moderate Pain (4 - 6)      CAPILLARY BLOOD GLUCOSE    POCT Blood Glucose.: 115 mg/dL (26 Dec 2021 08:04)  POCT Blood Glucose.: 102 mg/dL (25 Dec 2021 21:03)  POCT Blood Glucose.: 94 mg/dL (25 Dec 2021 16:54)  POCT Blood Glucose.: 190 mg/dL (25 Dec 2021 12:10)    I&O's Summary    25 Dec 2021 07:01  -  26 Dec 2021 07:00  --------------------------------------------------------  IN: 480 mL / OUT: 1490 mL / NET: -1010 mL      PHYSICAL EXAM:  Vital Signs Last 24 Hrs  T(C): 36.3 (26 Dec 2021 04:26), Max: 36.9 (25 Dec 2021 20:48)  T(F): 97.4 (26 Dec 2021 04:26), Max: 98.4 (25 Dec 2021 20:48)  HR: 86 (26 Dec 2021 04:26) (86 - 99)  BP: 95/75 (26 Dec 2021 04:33) (88/63 - 102/70)  BP(mean): 82 (26 Dec 2021 04:33) (72 - 82)  RR: 18 (26 Dec 2021 04:26) (18 - 18)  SpO2: 99% (26 Dec 2021 04:26) (98% - 99%)    CONSTITUTIONAL: NAD, well-developed; trach site bandaged c/d/i, clean underneath with clear mucus  RESPIRATORY: Normal respiratory effort; lungs are clear to auscultation bilaterally; on RA  CARDIOVASCULAR: LVAD heard; No lower extremity edema; Peripheral pulses are 2+ bilaterally  ABDOMEN: Nontender to palpation, normoactive bowel sounds, no rebound/guarding; No hepatosplenomegaly  MUSCLOSKELETAL: no clubbing or cyanosis of digits; no joint swelling or tenderness to palpation  PSYCH: A+O to person, place, and time; affect appropriate    LABS:                        9.8    10.59 )-----------( 220      ( 26 Dec 2021 06:18 )             30.1     12-26    127<L>  |  94<L>  |  16  ----------------------------<  115<H>  4.6   |  22  |  0.67    Ca    9.4      26 Dec 2021 06:18  Phos  3.2     12-26  Mg     2.0     12-26    TPro  8.5<H>  /  Alb  3.4  /  TBili  0.3  /  DBili  x   /  AST  16  /  ALT  15  /  AlkPhos  161<H>  12-26

## 2021-12-26 NOTE — PROGRESS NOTE ADULT - ATTENDING COMMENTS
64 yo man with a history of Stage D HF s/p VAD placement ,history of COVID infection in April 2020, history of a prolonged hospitalization in 2021 with recurrent sepsis, pneumonia, intubated/trach x2 cycles , chronic gall bladder disease s/p perc dawn, SMA ischemia requiring a stent placement, cachexia who was discharged to rehab but presented from [facility] on 12/13 with AMS, hypotension, tachycardia found to be COVID (+) + serratia bacteremia. Initially admitted to ICU requiring pressor support, now on floors. Stable pending [facility] acceptance.     Serratia Bacteremia- On Cefepime since 12/13 will transition to 2 weeks of quinolone on d/c  Leukocytosis unclear etiology, possibly reactive due to dexamethasone, however last dose 12/22, currently 10s- CTM  COVID19 s/p monoclonal ab, Remdesivir and Dexamethasone currently on room air, no longer requires precautions - will discuss vaccination prior to discharge  HF: intermittent episodes of hypotension. Given poor po intake likely hypovolemic - fluids cautiously prn  Intractable Hiccups - on Baclofen  Amiodarone Toxicity - on Methimazole  Full code - palliative following regarding GOC  C diff - on PO Vancomycin  DC planning - still COVID positive 12/26

## 2021-12-27 NOTE — PROGRESS NOTE ADULT - SUBJECTIVE AND OBJECTIVE BOX
Subjective: Patient seen and examined resting in bed.     Medications:  acetaminophen     Tablet .. 650 milliGRAM(s) Oral every 6 hours PRN  aspirin  chewable 81 milliGRAM(s) Oral daily  baclofen 5 milliGRAM(s) Oral every 8 hours  cefepime   IVPB 2000 milliGRAM(s) IV Intermittent every 8 hours  chlorhexidine 2% Cloths 1 Application(s) Topical <User Schedule>  dextrose 50% Injectable 50 milliLiter(s) IV Push every 15 minutes  dextrose 50% Injectable 25 milliLiter(s) IV Push every 15 minutes  enoxaparin Injectable 30 milliGRAM(s) SubCutaneous daily  insulin lispro (ADMELOG) corrective regimen sliding scale   SubCutaneous three times a day before meals  insulin lispro (ADMELOG) corrective regimen sliding scale   SubCutaneous at bedtime  methimazole 10 milliGRAM(s) Oral daily  metoclopramide Injectable 10 milliGRAM(s) IV Push every 8 hours  metoprolol succinate ER 50 milliGRAM(s) Oral daily  mirtazapine 7.5 milliGRAM(s) Oral at bedtime  multivitamin 1 Tablet(s) Oral daily  pantoprazole  Injectable 40 milliGRAM(s) IV Push every 12 hours  potassium phosphate / sodium phosphate Tablet (K-PHOS No. 2) 1 Tablet(s) Oral two times a day  sertraline 100 milliGRAM(s) Oral daily  sodium chloride 0.9% lock flush 3 milliLiter(s) IV Push every 8 hours  sodium chloride 0.9%. 1000 milliLiter(s) IV Continuous <Continuous>  spironolactone 25 milliGRAM(s) Oral daily  vancomycin    Solution 125 milliGRAM(s) Oral every 12 hours      Vitals:  Vital Signs Last 24 Hours  T(C): 36.7 (12-27-21 @ 04:45), Max: 36.8 (12-26-21 @ 21:16)  HR: 97 (12-27-21 @ 08:00) (89 - 97)  BP: 93/68 (12-27-21 @ 08:00) (86/71 - 103/72)  RR: 18 (12-27-21 @ 04:45) (18 - 18)  SpO2: 99% (12-27-21 @ 04:45) (99% - 100%)        I&O's Summary    26 Dec 2021 07:01  -  27 Dec 2021 07:00  --------------------------------------------------------  IN: 240 mL / OUT: 1365 mL / NET: -1125 mL        Physical Exam  General: No distress. Comfortable.  HEENT: EOM intact.  Neck: Neck supple. JVP not elevated. No masses  Chest: Clear to auscultation bilaterally  CV: Normal S1 and S2. No murmurs, rub, or gallops. Radial pulses normal.  Abdomen: Soft, non-distended, non-tender  Skin: No rashes or skin breakdown  Neurology: Alert and oriented times three. Sensation intact  Psych: Affect normal    LVAD Interrogation  VAD TYPE HM 2  Speed 9200  Flow 4.3  Power 5.1  PI  4.2  Assessment of driveline exit site: driveline dressing c/d/i  Programming changes: no changes made    Labs:                        9.7    8.64  )-----------( 236      ( 27 Dec 2021 06:11 )             29.8     12-27    128<L>  |  97  |  14  ----------------------------<  98  4.5   |  22  |  0.64    Ca    9.8      27 Dec 2021 06:11  Phos  3.1     12-27  Mg     2.0     12-27    TPro  8.5<H>  /  Alb  3.2<L>  /  TBili  0.3  /  DBili  x   /  AST  16  /  ALT  13  /  AlkPhos  148<H>  12-27              Lactate Dehydrogenase, Serum: 189 U/L (12-27 @ 06:11)  Lactate Dehydrogenase, Serum: 202 U/L (12-26 @ 06:18)  Lactate Dehydrogenase, Serum: 211 U/L (12-25 @ 07:19)

## 2021-12-27 NOTE — PROGRESS NOTE ADULT - SUBJECTIVE AND OBJECTIVE BOX
**************************  Bre El  PGY-1 Internal Medicine  [phone]  **************************    Patient is a 66y old  Male who presents with a chief complaint of COVID (+) with AMS and hypotension (26 Dec 2021 08:28)      SUBJECTIVE / OVERNIGHT EVENTS:  On telemetry, NSR 80-90s with occasional PVCs. BPs have been low, 89/65 overnight but asymptomatic. This morning, laying under the blankets as he is bothered by the light and sound on the unit. Otherwise no complaints.    MEDICATIONS  (STANDING):  aspirin  chewable 81 milliGRAM(s) Oral daily  baclofen 5 milliGRAM(s) Oral every 8 hours  cefepime   IVPB 2000 milliGRAM(s) IV Intermittent every 8 hours  chlorhexidine 2% Cloths 1 Application(s) Topical <User Schedule>  dextrose 50% Injectable 50 milliLiter(s) IV Push every 15 minutes  dextrose 50% Injectable 25 milliLiter(s) IV Push every 15 minutes  enoxaparin Injectable 30 milliGRAM(s) SubCutaneous daily  insulin lispro (ADMELOG) corrective regimen sliding scale   SubCutaneous three times a day before meals  insulin lispro (ADMELOG) corrective regimen sliding scale   SubCutaneous at bedtime  methimazole 10 milliGRAM(s) Oral daily  metoclopramide Injectable 10 milliGRAM(s) IV Push every 8 hours  metoprolol succinate ER 50 milliGRAM(s) Oral daily  mirtazapine 7.5 milliGRAM(s) Oral at bedtime  multivitamin 1 Tablet(s) Oral daily  pantoprazole  Injectable 40 milliGRAM(s) IV Push every 12 hours  potassium phosphate / sodium phosphate Tablet (K-PHOS No. 2) 1 Tablet(s) Oral two times a day  sertraline 100 milliGRAM(s) Oral daily  sodium chloride 0.9% lock flush 3 milliLiter(s) IV Push every 8 hours  sodium chloride 0.9%. 1000 milliLiter(s) (100 mL/Hr) IV Continuous <Continuous>  spironolactone 25 milliGRAM(s) Oral daily  vancomycin    Solution 125 milliGRAM(s) Oral every 12 hours    MEDICATIONS  (PRN):  acetaminophen     Tablet .. 650 milliGRAM(s) Oral every 6 hours PRN Temp greater or equal to 38C (100.4F), Mild Pain (1 - 3), Moderate Pain (4 - 6)      CAPILLARY BLOOD GLUCOSE    POCT Blood Glucose.: 96 mg/dL (27 Dec 2021 08:05)  POCT Blood Glucose.: 136 mg/dL (26 Dec 2021 21:17)  POCT Blood Glucose.: 80 mg/dL (26 Dec 2021 16:53)  POCT Blood Glucose.: 154 mg/dL (26 Dec 2021 12:12)    I&O's Summary    26 Dec 2021 07:01  -  27 Dec 2021 07:00  --------------------------------------------------------  IN: 240 mL / OUT: 1365 mL / NET: -1125 mL      PHYSICAL EXAM:  Vital Signs Last 24 Hrs  T(C): 36.7 (27 Dec 2021 04:45), Max: 36.8 (26 Dec 2021 21:16)  T(F): 98 (27 Dec 2021 04:45), Max: 98.2 (26 Dec 2021 21:16)  HR: 97 (27 Dec 2021 08:00) (89 - 97)  BP: 93/68 (27 Dec 2021 08:00) (86/71 - 103/72)  BP(mean): 76 (27 Dec 2021 08:00) (72 - 83)  RR: 18 (27 Dec 2021 04:45) (18 - 18)  SpO2: 99% (27 Dec 2021 04:45) (99% - 100%)    CONSTITUTIONAL: NAD, well-developed; trach site bandaged c/d/i, clean underneath with clear mucus  RESPIRATORY: Normal respiratory effort; lungs are clear to auscultation bilaterally; on RA  CARDIOVASCULAR: LVAD heard; No lower extremity edema; Peripheral pulses are 2+ bilaterally  ABDOMEN: Nontender to palpation, normoactive bowel sounds, no rebound/guarding; No hepatosplenomegaly  MUSCLOSKELETAL: no clubbing or cyanosis of digits; no joint swelling or tenderness to palpation  PSYCH: A+O to person, place, and time; affect appropriate    LABS:                        9.7    8.64  )-----------( 236      ( 27 Dec 2021 06:11 )             29.8     12-27    128<L>  |  97  |  14  ----------------------------<  98  4.5   |  22  |  0.64    Ca    9.8      27 Dec 2021 06:11  Phos  3.1     12-27  Mg     2.0     12-27    TPro  8.5<H>  /  Alb  3.2<L>  /  TBili  0.3  /  DBili  x   /  AST  16  /  ALT  13  /  AlkPhos  148<H>  12-27

## 2021-12-27 NOTE — PROGRESS NOTE ADULT - PROBLEM SELECTOR PLAN 2
- History of recurrent pneumonia and bacteremia  - With stenotrophomonas in prior sputum cultures and enterobacter and serratia in blood in October 2021  - Blood cultures 12/13 + Serratia  - RUQ sonogram negative  - Cefepime 2g IV q8 (12/13- )  - repeat bcx NGTD  - as per ID, will need suppressive oral quinolone post treatment of bacteremia x2weeks - History of recurrent pneumonia and bacteremia  - With stenotrophomonas in prior sputum cultures and enterobacter and serratia in blood in October 2021  - Blood cultures 12/13 + Serratia  - RUQ sonogram negative  - Cefepime 2g IV q8 (12/13-12/27)  - Bactrim started 12/27  - repeat bcx NGTD  - as per ID, will need suppressive oral quinolone post treatment of bacteremia x2weeks

## 2021-12-27 NOTE — PROGRESS NOTE ADULT - ATTENDING COMMENTS
67 y/o M with a history of Stage D NICM s/p HM2 LVAD in 9/2017 at  who recently had a prolonged hospital course complicated by respiratory failure, sepsis, and GI bleeding who now comes in with COVID  Doing well and waiting for rehab placement, unfortunately he is still testing positive so he has to wait for it be negative  on aspirin 81mg, off systemic AC due to recurrent GI bleeding  For COVID is s/p monoclonal antibodies, remdesivir, and dexamethasone

## 2021-12-27 NOTE — PROGRESS NOTE ADULT - PROBLEM SELECTOR PLAN 5
- history of thyrotoxicosis with amio, on methimazole  - continue BB as above, last episode of VT was noted to be on 12/24  - Electrolyte repletion to maintain K 4-4.5 and Mg 2-2.5

## 2021-12-27 NOTE — PROVIDER CONTACT NOTE (OTHER) - ACTION/TREATMENT ORDERED:
Sarah Jose MD (T4) made aware, ordered to reschedule AM metoprolol and spironolactone to 0800 and reassess VS.

## 2021-12-27 NOTE — PROGRESS NOTE ADULT - PROBLEM SELECTOR PLAN 2
- Dr. Prater, ID following  - COVID PCR positive 12/13  - s/p monoclonal antibodies, remdesivir, and dexamethasone  - hx of Serratia bacteremia- per ID will be transitioned from IV Cefepime to Bactrim DS PO QD  - continue with Vanco PO for C. Diff ppx   - gallbladder US unremarkable

## 2021-12-27 NOTE — PROGRESS NOTE ADULT - SUBJECTIVE AND OBJECTIVE BOX
INFECTIOUS DISEASES FOLLOW UP-- Anitra Prater  [phone]    This is a follow up note for this  66yMale with  Sepsis  s/p COVID infection  serratia bacteremia        ROS:  CONSTITUTIONAL:  No fever, good appetite  CARDIOVASCULAR:  No chest pain or palpitations  RESPIRATORY:  No dyspnea  GASTROINTESTINAL:  No nausea, vomiting, diarrhea, or abdominal pain  GENITOURINARY:  No dysuria  NEUROLOGIC:  No headache,     Allergies    Amiodarone Hydrochloride (Other (Severe))    Intolerances        ANTIBIOTICS/RELEVANT:  antimicrobials  trimethoprim  160 mG/sulfamethoxazole 800 mG 1 Tablet(s) Oral daily  vancomycin    Solution 125 milliGRAM(s) Oral every 12 hours    immunologic:    OTHER:  acetaminophen     Tablet .. 650 milliGRAM(s) Oral every 6 hours PRN  aspirin  chewable 81 milliGRAM(s) Oral daily  baclofen 5 milliGRAM(s) Oral every 8 hours  chlorhexidine 2% Cloths 1 Application(s) Topical <User Schedule>  dextrose 50% Injectable 50 milliLiter(s) IV Push every 15 minutes  dextrose 50% Injectable 25 milliLiter(s) IV Push every 15 minutes  enoxaparin Injectable 30 milliGRAM(s) SubCutaneous daily  insulin lispro (ADMELOG) corrective regimen sliding scale   SubCutaneous three times a day before meals  insulin lispro (ADMELOG) corrective regimen sliding scale   SubCutaneous at bedtime  methimazole 10 milliGRAM(s) Oral daily  metoclopramide Injectable 10 milliGRAM(s) IV Push every 8 hours  metoprolol succinate ER 50 milliGRAM(s) Oral daily  mirtazapine 7.5 milliGRAM(s) Oral at bedtime  multivitamin 1 Tablet(s) Oral daily  pantoprazole  Injectable 40 milliGRAM(s) IV Push every 12 hours  potassium phosphate / sodium phosphate Tablet (K-PHOS No. 2) 1 Tablet(s) Oral two times a day  sertraline 100 milliGRAM(s) Oral daily  sodium chloride 0.9% lock flush 3 milliLiter(s) IV Push every 8 hours  sodium chloride 0.9%. 1000 milliLiter(s) IV Continuous <Continuous>  spironolactone 25 milliGRAM(s) Oral daily      Objective:  Vital Signs Last 24 Hrs  T(C): 36.4 (27 Dec 2021 12:00), Max: 36.8 (26 Dec 2021 21:16)  T(F): 97.6 (27 Dec 2021 12:00), Max: 98.2 (26 Dec 2021 21:16)  HR: 103 (27 Dec 2021 16:00) (89 - 103)  BP: 90/67 (27 Dec 2021 16:00) (89/65 - 94/58)  BP(mean): 76 (27 Dec 2021 08:00) (72 - 76)  RR: 18 (27 Dec 2021 12:00) (18 - 18)  SpO2: 99% (27 Dec 2021 12:00) (99% - 100%)    PHYSICAL EXAM:  Constitutional:no acute distress  Eyes:ALONSO, EOMI  Ear/Nose/Throat: no oral lesions, trach stoma site no erythema, able to answer questions	  Respiratory: clear BL  Cardiovascular: VAD sounds  Gastrointestinal:soft, (+) BS, no tenderness  Extremities:no e/e/c  No Lymphadenopathy  IV sites not inflammed.    LABS:                        9.7    8.64  )-----------( 236      ( 27 Dec 2021 06:11 )             29.8     12-27    128<L>  |  97  |  14  ----------------------------<  98  4.5   |  22  |  0.64    Ca    9.8      27 Dec 2021 06:11  Phos  3.1     12-27  Mg     2.0     12-27    TPro  8.5<H>  /  Alb  3.2<L>  /  TBili  0.3  /  DBili  x   /  AST  16  /  ALT  13  /  AlkPhos  148<H>  12-27          MICROBIOLOGY:    Culture - Blood in AM (12.18.21 @ 08:15)    Specimen Source: .Blood Blood    Culture Results:   No Growth Final    Culture - Blood (12.13.21 @ 05:50)    Gram Stain:   Growth in aerobic bottle: Gram Negative Rods  Growth in anaerobic bottle: Gram Negative Rods    -  Amikacin: S <=16    -  Ampicillin: R >16 These ampicillin results predict results for amoxicillin    -  Ampicillin/Sulbactam: R >16/8 Enterobacter, Klebsiella aerogenes, Citrobacter, and Serratia may develop resistance during prolonged therapy (3-4 days)    -  Aztreonam: S <=4    -  Cefazolin: R >16 Enterobacter, Klebsiella aerogenes, Citrobacter, and Serratia may develop resistance during prolonged therapy (3-4 days)    -  Cefepime: S <=2    -  Cefoxitin: R >16    -  Ceftriaxone: S <=1 Enterobacter, Klebsiella aerogenes, Citrobacter, and Serratia may develop resistance during prolonged therapy    -  Ciprofloxacin: R >2    -  Ertapenem: S <=0.5    -  Gentamicin: S <=2    -  Levofloxacin: R 2    -  Meropenem: S <=1    -  Piperacillin/Tazobactam: S <=8    -  Tobramycin: S 4    -  Trimethoprim/Sulfamethoxazole: S 2/38    Specimen Source: .Blood Arterial Catheter    Organism: Serratia marcescens    Culture Results:   Growth in aerobic and anaerobic bottles: Serratia marcescens    Organism Identification: Serratia marcescens    Method Type: CLAU            RECENT CULTURES:      RADIOLOGY & ADDITIONAL STUDIES:    < from: US Abdomen Upper Quadrant Right (12.17.21 @ 13:35) >  IMPRESSION:    Technically limited portable examination.    Gallbladder is contracted. Cholecystostomy tube is not well visualized.    No biliary ductal dilatation.    < end of copied text >

## 2021-12-27 NOTE — PROGRESS NOTE ADULT - ASSESSMENT
67 y/o M with a history of Stage D NICM s/p HM2 LVAD in 9/2017 at  (due to severe PAD) with TV ring, multiple GI bleeds, thus maintained off AC, CKD 3prior COVID-19 infection in 4/2020, recurrent syncope post LVAD s/p ILR, s/p prolonged complex hospitalization from 6/14-11/16/2021 initially admitted with abdominal pain complicated by GIB, respiratory failure s/p trach, multiple infections, s/p cholecystotomy tube and SMA stent 10/2021, ultimately discharged to [facility] rehab and then returned to [facility] for trach decannulation 12/2 who now presents with recurrent COVID-19 infection, initially in distributive shock. 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     He had elevated inflammatory markers which are now improving. He received monoclonal antibodies and was started on remdesivir and dexamethasone. He's afebrile with BCx from 12/18 NGTD. He is saturating well on RA and was weaned off pressors 12/16. He had runs of MMVT in setting of hypokalemia and being off his beta blocker which has since resolved. he currently appears well compensated from HF perspective. His LDH is stable. His LVAD is functioning well without s/s of pump malfunction.    He is medically ready for discharge and are assessing options for discharge to rehab.

## 2021-12-27 NOTE — PROGRESS NOTE ADULT - ASSESSMENT
64 yo man with a history of VAD placement history of COVID infection in April 2020, history of a prolonged hospitalization in 2021 with recurrent sepsis, pneumonia, intubated/trach x2 cycles , chronic gall bladder disease s/p perc dawn, SMA ischemia requiring a stent placement, cachexia who was discharged to rehab but is presented from [facility] on 12/13 with AMS, hypotensive, tachycardic found to be COVID (+). Pt lethargic and mumbling words required ICU admission and pressor support now titrated off and downgraded to the floor. Pending dispo to [facility].

## 2021-12-27 NOTE — PROGRESS NOTE ADULT - PROBLEM SELECTOR PLAN 4
- continue with Toprol XL to 50 mg daily  - continue spironolactone 25 mg daily  - Daily PT  - Encourage oral intake and nutrition

## 2021-12-27 NOTE — PROGRESS NOTE ADULT - ASSESSMENT
66yo man with a history of VAD palcement, history of COVID infeciton in April 2020, history of a prolonged hospitalization in 2021 with recurrent sepsis, pneumonia, intubated/trach x2 cycles , chronic gall bladder disease s/p perc dawn, SMA ischemia reuiring a stent placement, cachecxia who was discharged to rehab but is presented from [facility] on 12/13 with AMS, hypotensive, tachycardic found to be COVID (+). Pt is lethargic and mumbling words required ICU admission and pressor support.    More awake, alert, interactive   oxygen nasal requirements with reasonable saturation    COVID infection-   confirmed second bout of COVID by documented PCR testing  Ideally, would be interested in sequencing the virus to determine strain (omicron vs delta)  Received a dose of monoclonal antibodies [employer] product   Completed remdesivir therapy  Completed ten days of dexamethasone  Refuses most vaccines- but will need to wait three months  to receive COVID vaccine series post monoclonal  Will administer flu/pneumonia vaccines this admission prior to discharge if he will permit        Serratia Bacteremia:  History of recurrent pneumonia and bacteremias   With stenotrophomonas in prior sputum cultures and enterobacter and serratia in blood in October 2021  Blood cultures sent 12/13 growing serratia-    Cefepime to 2 gram IV q 8hr as strain is sensitive to Cefepime- completed treatment course   suppression with oral bactrim started   repeat blood cultures for evidence of clearing demonstrated clearance of infection   RUQ sonogram done to reassess- gall bladder does not show acute cholecystitis  Hiccups- resolved    Source of serratia not entirely clear- differential GI translocation, biliary tree, lungs, other including LVAD      Prior severe C.diff infection   pre-emptive oral Vanco 125mg bid    discussed with CHF team    Morris Prater MD  [phone]  After 5pm/weekends [phone]

## 2021-12-27 NOTE — PROGRESS NOTE ADULT - PROBLEM SELECTOR PLAN 1
- Continue with speed of 9200  - s/p controller change on 12/16   - Daily LDH  - Continue ASA 81 mg daily  - He is off coumadin due to persistent recurrent GI bleeding

## 2021-12-28 NOTE — PROGRESS NOTE ADULT - PROBLEM SELECTOR PLAN 2
- History of recurrent pneumonia and bacteremia  - With stenotrophomonas in prior sputum cultures and enterobacter and serratia in blood in October 2021  - Blood cultures 12/13 + Serratia  - RUQ sonogram negative  - Cefepime 2g IV q8 (12/13-12/27)  - Bactrim started (12/27- )  - repeat bcx NGTD  - as per ID, will need suppressive oral quinolone post treatment of bacteremia x2weeks

## 2021-12-28 NOTE — PROGRESS NOTE ADULT - ATTENDING COMMENTS
65 y/o M with a history of Stage D NICM s/p HM2 LVAD in 9/2017 at  who recently had a prolonged hospital course complicated by respiratory failure, sepsis, and GI bleeding who now comes in with COVID  Doing well and waiting for rehab placement, unfortunately he is still testing positive so he has to wait for it be negative. Will retest today  on aspirin 81mg, off systemic AC due to recurrent GI bleeding  For COVID is s/p monoclonal antibodies, remdesivir, and dexamethasone

## 2021-12-28 NOTE — PROGRESS NOTE ADULT - ASSESSMENT
67 y/o M with a history of Stage D NICM s/p HM2 LVAD in 9/2017 at  (due to severe PAD) with TV ring, multiple GI bleeds, thus maintained off AC, CKD 3prior COVID-19 infection in 4/2020, recurrent syncope post LVAD s/p ILR, s/p prolonged complex hospitalization from 6/14-11/16/2021 initially admitted with abdominal pain complicated by GIB, respiratory failure s/p trach, multiple infections, s/p cholecystotomy tube and SMA stent 10/2021, ultimately discharged to [facility] rehab and then returned to [facility] for trach decannulation 12/2 who now presents with recurrent COVID-19 infection, initially in distributive shock. 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     He had elevated inflammatory markers which are now improving. He received monoclonal antibodies and was started on remdesivir and dexamethasone. He's afebrile with BCx from 12/18 NGTD. He is saturating well on RA and was weaned off pressors 12/16. He had runs of MMVT in setting of hypokalemia and being off his beta blocker which has since resolved. he currently appears well compensated from HF perspective. His LDH is stable. His LVAD is functioning well without s/s of pump malfunction.    He is medically ready for discharge and are assessing options for discharge to rehab.

## 2021-12-28 NOTE — PROGRESS NOTE ADULT - ASSESSMENT
66 yo man with a history of VAD placement history of COVID infection in April 2020, history of a prolonged hospitalization in 2021 with recurrent sepsis, pneumonia, intubated/trach x2 cycles , chronic gall bladder disease s/p perc dawn, SMA ischemia requiring a stent placement, cachexia who was discharged to rehab but is presented from [facility] on 12/13 with AMS, hypotensive, tachycardic found to be COVID (+). Pt lethargic and mumbling words required ICU admission and pressor support now titrated off and downgraded to the floor. Pending dispo to [facility].

## 2021-12-28 NOTE — PROGRESS NOTE ADULT - ASSESSMENT
Mr. Ho Joint initially admitted for melena and anemia in the setting of supratherapeutic INR.  GI reconsulted for worsening anemia.    #Cdiff - PO vanc  # Acute blood loss anemia and melena - hemodynamically unchanged. Likely represents recurrent GI bleed. Initial melena workup lead to capsule endoscopy on 6/14/2021 which revealed active bleeding in small bowel.  Single balloon 6/15/2021 revealed old blood in proximal ileum. 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  # LVAD on anticoagulation with coumadin  # Trach  # Tension pneumo s/p chest tube placement 7/26  # Septic shock - 2/2 serratia bacteremia 2/2 cholecystitis  # Acalculous cholecystitis - s/p perc dawn    Recommendations:        Thank you for involving us in the care of this patient, please reach out if any further questions.     Francisco Wagner MD  Gastroenterology Fellow, PGY5    Available on [employer] Teams  [phone] ([facility])  [phone] ([facility])  Please contact on call fellow weekdays after 5pm-7am and weekends: [phone]             Mr. Ho Joint initially admitted for melena and anemia in the setting of supratherapeutic INR.  GI reconsulted for worsening anemia.    #Abd pain:   #S/p Cdiff infection  # Acute blood loss anemia and melena - hemodynamically unchanged. Likely represents recurrent GI bleed. Initial melena workup lead to capsule endoscopy on 6/14/2021 which revealed active bleeding in small bowel.  Single balloon 6/15/2021 revealed old blood in proximal ileum. 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  # LVAD on anticoagulation with coumadin  # Trach  # Tension pneumo s/p chest tube placement 7/26  # Septic shock - 2/2 serratia bacteremia 2/2 cholecystitis  # Acalculous cholecystitis - s/p perc dawn    Recommendations:  - Can trial on PPI BID in case esophagitis 2/2 NGT contributing to abdominal pain   -         Thank you for involving us in the care of this patient, please reach out if any further questions.     Francisco Wagner MD  Gastroenterology Fellow, PGY5    Available on [employer] Teams  [phone] ([facility])  [id number] ([facility])  Please contact on call fellow weekdays after 5pm-7am and weekends: [phone]             66M Hx stage D NICM s/p HM2 on 9/2017 as DT (due to severe PAD) with TV ring, prior COVID-19 infection 4/2020, recurrent syncope post LVAD s/p ILR, and chronic abdominal pain with prior negative workup who was admitted with symptomatic anemia and supratherapeutic INR.     #Abd pain: patient   #S/p Cdiff infection  # Acute blood loss anemia and melena - hemodynamically unchanged. Likely had recurrent GI bleed this admission. Initial melena workup lead to capsule endoscopy on 6/14/2021 which revealed active bleeding in small bowel.  Single balloon 6/15/2021 revealed old blood in proximal ileum. 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  # LVAD on anticoagulation with coumadin  # Trach  # Tension pneumo s/p chest tube placement 7/26  # Septic shock - 2/2 serratia bacteremia 2/2 cholecystitis  # Acalculous cholecystitis - s/p perc dawn    Recommendations:  - Can trial on PPI BID in case esophagitis 2/2 NGT contributing to abdominal pain   -         Thank you for involving us in the care of this patient, please reach out if any further questions.     Francisco Wagner MD  Gastroenterology Fellow, PGY5    Available on [employer] Teams  [phone] ([facility])  [id number] ([facility])  Please contact on call fellow weekdays after 5pm-7am and weekends: [phone]             jaw 66M Hx stage D NICM s/p HM2 on 9/2017 as DT (due to severe PAD) with TV ring, prior COVID-19 infection 4/2020, recurrent syncope post LVAD s/p ILR, and chronic abdominal pain with prior negative workup who was admitted with symptomatic anemia and supratherapeutic INR.     #Abd pain: pt with vacillating history regarding abdominal pain (reports location at times at per dawn/LVAD site, other times epigastric region, notes left sided pain many years prior // endorses chronicity possibly many years prior vs noted to be possibly x 1 week according to team // unclear if associated with TF). Now having brown BMs, no further recent bleeding. Ddx unclear source of pain -- possibly MSK at site of perc dawn drain/LVAD pump vs can consider esophagitis in setting of long standing NGT vs IBS vs unlikely biliary in nature (perc dawn draining well, normal LFTs) vs unlikely chronic mesenteric ischemia (vessels patent on imaging) vs unlikely SMA syndrome.   # Acute blood loss anemia and melena - hemodynamically unchanged. Likely had recurrent GI bleed this admission. Initial melena workup lead to capsule endoscopy on 6/14/2021 which revealed active bleeding in small bowel.  Single balloon 6/15/2021 revealed old blood in proximal ileum. 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  # LVAD on anticoagulation with coumadin  # Trach  # Tension pneumo s/p chest tube placement 7/26  # Septic shock - 2/2 serratia bacteremia 2/2 cholecystitis  # Acalculous cholecystitis - s/p perc dawn  #S/p Cdiff infection    Recommendations:  - Can trial on PPI BID in case esophagitis 2/2 NGT contributing to abdominal pain   - Encouraged trial on TF again  - Consider IV tylenol for MSK pain control and monitor response   - Monitor further symptoms and associations        Thank you for involving us in the care of this patient, please reach out if any further questions.     Francisco Wagner MD  Gastroenterology Fellow, PGY5    Available on [employer] Teams  [phone] ([facility])  [phone] ([facility])  Please contact on call fellow weekdays after 5pm-7am and weekends: [phone]

## 2021-12-28 NOTE — PROGRESS NOTE ADULT - SUBJECTIVE AND OBJECTIVE BOX
Subjective: Patient seen and examined resting in bed     Medications:  acetaminophen     Tablet .. 650 milliGRAM(s) Oral every 6 hours PRN  aspirin  chewable 81 milliGRAM(s) Oral daily  baclofen 5 milliGRAM(s) Oral every 8 hours  chlorhexidine 2% Cloths 1 Application(s) Topical <User Schedule>  dextrose 50% Injectable 50 milliLiter(s) IV Push every 15 minutes  dextrose 50% Injectable 25 milliLiter(s) IV Push every 15 minutes  enoxaparin Injectable 30 milliGRAM(s) SubCutaneous daily  insulin lispro (ADMELOG) corrective regimen sliding scale   SubCutaneous three times a day before meals  insulin lispro (ADMELOG) corrective regimen sliding scale   SubCutaneous at bedtime  magnesium sulfate  IVPB 2 Gram(s) IV Intermittent once  methimazole 10 milliGRAM(s) Oral daily  metoclopramide Injectable 10 milliGRAM(s) IV Push every 8 hours  metoprolol succinate ER 50 milliGRAM(s) Oral daily  mirtazapine 7.5 milliGRAM(s) Oral at bedtime  multivitamin 1 Tablet(s) Oral daily  pantoprazole  Injectable 40 milliGRAM(s) IV Push every 12 hours  potassium phosphate / sodium phosphate Tablet (K-PHOS No. 2) 1 Tablet(s) Oral two times a day  sertraline 100 milliGRAM(s) Oral daily  sodium chloride 0.9% lock flush 3 milliLiter(s) IV Push every 8 hours  sodium chloride 0.9%. 1000 milliLiter(s) IV Continuous <Continuous>  spironolactone 25 milliGRAM(s) Oral daily  trimethoprim  160 mG/sulfamethoxazole 800 mG 1 Tablet(s) Oral daily  vancomycin    Solution 125 milliGRAM(s) Oral every 12 hours      Vitals:  Vital Signs Last 24 Hours  T(C): 36.8 (21 @ 04:45), Max: 36.8 (21 @ 04:45)  HR: 89 (21 @ 04:45) (89 - 103)  BP: 90/66 (21 @ 04:45) (83/62 - 94/58)  RR: 18 (21 @ 04:45) (18 - 18)  SpO2: 98% (21 @ 04:45) (98% - 99%)    Weight in k.4 ( @ 08:24)    I&O's Summary    27 Dec 2021 07:01  -  28 Dec 2021 07:00  --------------------------------------------------------  IN: 500 mL / OUT: 1775 mL / NET: -1275 mL        Physical Exam  General: No distress. Comfortable.  HEENT: EOM intact.  Neck: Neck supple. JVP not elevated. No masses  Chest: Clear to auscultation bilaterally  CV: +VAD hum  Abdomen: Soft, non-distended, non-tender, +bilary drain   Neurology: Alert and oriented times three.     LVAD Interrogation  VAD TYPE HM 2  Speed 9200  Flow 5.1  Power 5.5  PI  5.2  Assessment of driveline exit site: driveline dressing c/d/i  Programming changes: no changes made    Labs:                        8.9    9.23  )-----------( 208      ( 28 Dec 2021 06:06 )             27.0         129<L>  |  98  |  13  ----------------------------<  86  4.5   |  20<L>  |  0.66    Ca    8.9      28 Dec 2021 06:05  Phos  2.7       Mg     1.7         TPro  8.3  /  Alb  3.1<L>  /  TBili  0.2  /  DBili  x   /  AST  14  /  ALT  15  /  AlkPhos  134<H>                Lactate Dehydrogenase, Serum: 183 U/L ( @ 06:05)  Lactate Dehydrogenase, Serum: 189 U/L ( @ 06:11)  Lactate Dehydrogenase, Serum: 202 U/L ( @ 06:18)  Lactate Dehydrogenase, Serum: 211 U/L ( @ 07:19)

## 2021-12-28 NOTE — PROGRESS NOTE ADULT - PROBLEM SELECTOR PLAN 2
- Dr. Prater, ID following  - COVID PCR positive 12/26, will repeat COVID PCR today  - s/p monoclonal antibodies, remdesivir, and dexamethasone  - hx of Serratia bacteremia- per ID continue with Bactrim DS PO QD  - continue with Vanco PO for C. Diff ppx   - gallbladder US unremarkable

## 2021-12-28 NOTE — PROGRESS NOTE ADULT - SUBJECTIVE AND OBJECTIVE BOX
**************************  Bre El  PGY-1 Internal Medicine  [phone]  **************************    Patient is a 66y old  Male who presents with a chief complaint of COVID (+) with AMS and hypotension (28 Dec 2021 07:17)      SUBJECTIVE / OVERNIGHT EVENTS:  On telemetry, NSR 80-90s, with SVT vs aberrancy for less than 26 seconds, and an episode of sinus tach to the 100s. This morning, asymptomatic, magnesium repleted. NS bolus given for BP 83/62. No complaints at this time.    MEDICATIONS  (STANDING):  aspirin  chewable 81 milliGRAM(s) Oral daily  baclofen 5 milliGRAM(s) Oral every 8 hours  chlorhexidine 2% Cloths 1 Application(s) Topical <User Schedule>  dextrose 50% Injectable 50 milliLiter(s) IV Push every 15 minutes  dextrose 50% Injectable 25 milliLiter(s) IV Push every 15 minutes  enoxaparin Injectable 30 milliGRAM(s) SubCutaneous daily  insulin lispro (ADMELOG) corrective regimen sliding scale   SubCutaneous three times a day before meals  insulin lispro (ADMELOG) corrective regimen sliding scale   SubCutaneous at bedtime  methimazole 10 milliGRAM(s) Oral daily  metoclopramide Injectable 10 milliGRAM(s) IV Push every 8 hours  metoprolol succinate ER 50 milliGRAM(s) Oral daily  mirtazapine 7.5 milliGRAM(s) Oral at bedtime  multivitamin 1 Tablet(s) Oral daily  pantoprazole  Injectable 40 milliGRAM(s) IV Push every 12 hours  potassium phosphate / sodium phosphate Tablet (K-PHOS No. 2) 1 Tablet(s) Oral two times a day  sertraline 100 milliGRAM(s) Oral daily  sodium chloride 0.9% lock flush 3 milliLiter(s) IV Push every 8 hours  sodium chloride 0.9%. 1000 milliLiter(s) (100 mL/Hr) IV Continuous <Continuous>  spironolactone 25 milliGRAM(s) Oral daily  trimethoprim  160 mG/sulfamethoxazole 800 mG 1 Tablet(s) Oral daily  vancomycin    Solution 125 milliGRAM(s) Oral every 12 hours    MEDICATIONS  (PRN):  acetaminophen     Tablet .. 650 milliGRAM(s) Oral every 6 hours PRN Temp greater or equal to 38C (100.4F), Mild Pain (1 - 3), Moderate Pain (4 - 6)      CAPILLARY BLOOD GLUCOSE    POCT Blood Glucose.: 92 mg/dL (28 Dec 2021 08:14)  POCT Blood Glucose.: 123 mg/dL (27 Dec 2021 21:44)  POCT Blood Glucose.: 87 mg/dL (27 Dec 2021 16:52)  POCT Blood Glucose.: 138 mg/dL (27 Dec 2021 12:24)    I&O's Summary    27 Dec 2021 07:01  -  28 Dec 2021 07:00  --------------------------------------------------------  IN: 500 mL / OUT: 1775 mL / NET: -1275 mL      PHYSICAL EXAM:  Vital Signs Last 24 Hrs  T(C): 36.8 (28 Dec 2021 04:45), Max: 36.8 (28 Dec 2021 04:45)  T(F): 98.3 (28 Dec 2021 04:45), Max: 98.3 (28 Dec 2021 04:45)  HR: 82 (28 Dec 2021 08:23) (82 - 103)  BP: 91/70 (28 Dec 2021 08:23) (83/62 - 94/58)  BP(mean): 74 (28 Dec 2021 04:45) (69 - 74)  RR: 18 (28 Dec 2021 04:45) (18 - 18)  SpO2: 98% (28 Dec 2021 04:45) (98% - 99%)    CONSTITUTIONAL: NAD, well-developed; trach site bandaged c/d/i, clean underneath with clear mucus  RESPIRATORY: Normal respiratory effort; lungs are clear to auscultation bilaterally; on RA  CARDIOVASCULAR: LVAD heard; No lower extremity edema; Peripheral pulses are 2+ bilaterally  ABDOMEN: Nontender to palpation, normoactive bowel sounds, no rebound/guarding; No hepatosplenomegaly  MUSCLOSKELETAL: no clubbing or cyanosis of digits; no joint swelling or tenderness to palpation  PSYCH: A+O to person, place, and time; affect appropriate    LABS:                        8.9    9.23  )-----------( 208      ( 28 Dec 2021 06:06 )             27.0     12-28    129<L>  |  98  |  13  ----------------------------<  86  4.5   |  20<L>  |  0.66    Ca    8.9      28 Dec 2021 06:05  Phos  2.7     12-28  Mg     1.7     12-28    TPro  8.3  /  Alb  3.1<L>  /  TBili  0.2  /  DBili  x   /  AST  14  /  ALT  15  /  AlkPhos  134<H>  12-28

## 2021-12-28 NOTE — CHART NOTE - NSCHARTNOTEFT_GEN_A_CORE
Nutrition Follow Up Note  Patient seen for: Calorie Count Initiation    Chart reviewed, events noted. Per chart: 66M, PMH of Stage D NICM s/p HM2 LVAD (9/2017), multiple GI bleeds (thus maintained off AC), CKD 3, prior COVID-19 infection (4/2020), s/p prolonged complex hospitalization from 6/14-11/16/2021 initially admitted with abdominal pain complicated by GIB, respiratory failure s/p trach, multiple infections, s/p cholecystotomy tube and SMA stent 10/2021, ultimately discharged to [facility] rehab and then returned to [facility] for trach decannulation 12/2 who now presents with recurrent COVID-19 infection in shock.     Source: EMR, Team, Pt    Visited pt and made aware calorie count ordered per team; expressed indication for calorie count at this time. Noted pt's increased nutritional needs at this time and ways to optimize nutritional intake while with decreased appetite. Discussed consuming small frequent meals, keeping nonperishable food items at bedside to consume throughout the day, and drinking oral nutrition supplement between meals. RD hung Calorie count on Pt's door - to start tomorrow (12/29) at breakfast - RN and PCA made aware of calorie count and RD explained protocol for completing PO intake documentation. RD to follow-up upon completion to assess adequacy of PO intake.    Wendy Travis, MS, RD, CDN, [facility]  pager #[phone]

## 2021-12-28 NOTE — PROGRESS NOTE ADULT - PROBLEM SELECTOR PLAN 1
- COVID PCR + 12/13. Recent CXR clear, respiratory status stable on room air   - s/p monoclonal antibodies Regeneron   - s/p 5 day course of Remdesivir  - s/p 10 day course of Dexamethasone  - repeat COVID PCR 12/26 positive

## 2021-12-28 NOTE — PROGRESS NOTE ADULT - ATTENDING COMMENTS
66 yo man with a history of Stage D HF s/p VAD placement ,history of COVID infection in April 2020, history of a prolonged hospitalization in 2021 with recurrent sepsis, pneumonia, intubated/trach x2 cycles , chronic gall bladder disease s/p perc dawn, SMA ischemia requiring a stent placement, cachexia who was discharged to rehab but presented from [facility] on 12/13 with AMS, hypotension, tachycardia found to be COVID (+) + serratia bacteremia. Initially admitted to ICU requiring pressor support, now on floors. Stable pending [facility] acceptance.     Serratia Bacteremia- Was on Cefepime since 12/13 and now on Bactrim  Leukocytosis unclear etiology, possibly reactive due to dexamethasone, now resolved off dexa  COVID19 s/p monoclonal ab, Remdesivir and Dexamethasone currently on room air, no longer requires precautions - will discuss vaccination prior to discharge  HF: intermittent episodes of hypotension. Given poor po intake likely hypovolemic - fluids cautiously prn  Hyponatremia - send labs to evaluate for SIADH, repeat BMP post NS  Intractable Hiccups - on Baclofen  Amiodarone Toxicity - on Methimazole  Full code - palliative following regarding GOC  C diff - on PO Vancomycin  DC planning - still COVID positive 12/26 but per guidelines now off isolation, 10 days post onset of symptoms and medically cleared for discharge to [facility], will discuss with JULIUS

## 2021-12-29 NOTE — PROGRESS NOTE ADULT - ATTENDING COMMENTS
65 y/o M with a history of Stage D NICM s/p HM2 LVAD in 9/2017 at  who recently had a prolonged hospital course complicated by respiratory failure, sepsis, and GI bleeding who now comes in with COVID. S/P monoclonal antibodies, remdesivir, and dexamethasone. Awaiting rehab placement but delayed as he is still positive for Covid. However, out of isolation per guidelines so being rediscussed with rehab.

## 2021-12-29 NOTE — PROGRESS NOTE ADULT - ASSESSMENT
67 y/o M with a history of Stage D NICM s/p HM2 LVAD in 9/2017 at  (due to severe PAD) with TV ring, multiple GI bleeds, thus maintained off AC, CKD 3prior COVID-19 infection in 4/2020, recurrent syncope post LVAD s/p ILR, s/p prolonged complex hospitalization from 6/14-11/16/2021 initially admitted with abdominal pain complicated by GIB, respiratory failure s/p trach, multiple infections, s/p cholecystotomy tube and SMA stent 10/2021, ultimately discharged to [facility] rehab and then returned to [facility] for trach decannulation 12/2 who now presents with recurrent COVID-19 infection, initially in distributive shock. 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     He had elevated inflammatory markers which are now improving. He received monoclonal antibodies and was started on remdesivir and dexamethasone. He's afebrile with BCx from 12/18 NGTD. He is saturating well on RA and was weaned off pressors 12/16. He had runs of MMVT in setting of hypokalemia and being off his beta blocker which has since resolved. he currently appears well compensated from HF perspective. His LDH is stable. His LVAD is functioning well without s/s of pump malfunction.    He is medically ready for discharge and are assessing options for discharge to rehab.

## 2021-12-29 NOTE — PROGRESS NOTE ADULT - SUBJECTIVE AND OBJECTIVE BOX
Subjective:  - resting comfortably in bed  - denies complaints    Medications:  acetaminophen     Tablet .. 650 milliGRAM(s) Oral every 6 hours PRN  aspirin  chewable 81 milliGRAM(s) Oral daily  baclofen 5 milliGRAM(s) Oral every 8 hours  chlorhexidine 2% Cloths 1 Application(s) Topical <User Schedule>  dextrose 50% Injectable 50 milliLiter(s) IV Push every 15 minutes  dextrose 50% Injectable 25 milliLiter(s) IV Push every 15 minutes  enoxaparin Injectable 30 milliGRAM(s) SubCutaneous daily  insulin lispro (ADMELOG) corrective regimen sliding scale   SubCutaneous three times a day before meals  insulin lispro (ADMELOG) corrective regimen sliding scale   SubCutaneous at bedtime  methimazole 10 milliGRAM(s) Oral daily  metoclopramide Injectable 10 milliGRAM(s) IV Push every 8 hours  metoprolol succinate ER 50 milliGRAM(s) Oral daily  mirtazapine 7.5 milliGRAM(s) Oral at bedtime  multivitamin 1 Tablet(s) Oral daily  pantoprazole  Injectable 40 milliGRAM(s) IV Push every 12 hours  potassium phosphate / sodium phosphate Tablet (K-PHOS No. 2) 1 Tablet(s) Oral two times a day  sertraline 100 milliGRAM(s) Oral daily  sodium chloride 1 Gram(s) Oral daily  sodium chloride 0.9% lock flush 3 milliLiter(s) IV Push every 8 hours  sodium chloride 0.9%. 1000 milliLiter(s) IV Continuous <Continuous>  spironolactone 25 milliGRAM(s) Oral daily  trimethoprim  160 mG/sulfamethoxazole 800 mG 1 Tablet(s) Oral daily  vancomycin    Solution 125 milliGRAM(s) Oral every 12 hours      Physical Exam:    Vitals:  Vital Signs Last 24 Hours  T(C): 36.4 (12-29-21 @ 08:30), Max: 37 (12-28-21 @ 12:00)  HR: 90 (12-29-21 @ 08:30) (81 - 99)  BP: 102/68 (12-29-21 @ 08:30) (90/61 - 102/68)  RR: 17 (12-29-21 @ 08:30) (17 - 18)  SpO2: 100% (12-29-21 @ 08:30) (96% - 100%)    I&O's Summary    28 Dec 2021 07:01  -  29 Dec 2021 07:00  --------------------------------------------------------  IN: 600 mL / OUT: 1975 mL / NET: -1375 mL    Tele: SR HR     General: Frail. No distress. Comfortable.  HEENT: EOM intact.  Neck: Neck supple. JVP not elevated. No masses  Chest: Clear to auscultation bilaterally  CV: LVAD hum. No LE edema.   Abdomen: Soft, non-distended, non-tender  Skin: No rashes or skin breakdown.  Neurology: Alert and oriented times three. Sensation intact  Psych: Affect flat.    Labs:                        9.2    6.84  )-----------( 217      ( 29 Dec 2021 06:38 )             28.6     12-29    133<L>  |  98  |  12  ----------------------------<  82  4.6   |  20<L>  |  0.72    Ca    9.9      29 Dec 2021 06:37  Phos  3.4     12-29  Mg     1.8     12-29    TPro  8.9<H>  /  Alb  3.5  /  TBili  0.2  /  DBili  x   /  AST  17  /  ALT  12  /  AlkPhos  140<H>  12-29    Lactate Dehydrogenase, Serum: 220 U/L (12-29 @ 06:37)  Lactate Dehydrogenase, Serum: 183 U/L (12-28 @ 06:05)  Lactate Dehydrogenase, Serum: 189 U/L (12-27 @ 06:11)

## 2021-12-29 NOTE — PROGRESS NOTE ADULT - PROBLEM SELECTOR PLAN 3
- hydralazine discontinued  - spironolactone 25 mg QD  - metoprolol succinate increased to 50 mg QD  - replete Mg to 2-2.5, K 4-4.5  - Daily PT  - Encourage oral intake and nutrition, salt tabs

## 2021-12-29 NOTE — PROGRESS NOTE ADULT - SUBJECTIVE AND OBJECTIVE BOX
**************************  Bre El  PGY-1 Internal Medicine  [phone]  **************************    Patient is a 66y old  Male who presents with a chief complaint of COVID (+) with AMS and hypotension (28 Dec 2021 08:46)      SUBJECTIVE / OVERNIGHT EVENTS:  On telemetry, NSR 90-100s with occasional PVCs, asymptomatic. This morning resting comfortably in bed. Denies all ROS, asking when he will be leaving.    MEDICATIONS  (STANDING):  aspirin  chewable 81 milliGRAM(s) Oral daily  baclofen 5 milliGRAM(s) Oral every 8 hours  chlorhexidine 2% Cloths 1 Application(s) Topical <User Schedule>  dextrose 50% Injectable 50 milliLiter(s) IV Push every 15 minutes  dextrose 50% Injectable 25 milliLiter(s) IV Push every 15 minutes  enoxaparin Injectable 30 milliGRAM(s) SubCutaneous daily  insulin lispro (ADMELOG) corrective regimen sliding scale   SubCutaneous three times a day before meals  insulin lispro (ADMELOG) corrective regimen sliding scale   SubCutaneous at bedtime  magnesium sulfate  IVPB 2 Gram(s) IV Intermittent once  methimazole 10 milliGRAM(s) Oral daily  metoclopramide Injectable 10 milliGRAM(s) IV Push every 8 hours  metoprolol succinate ER 50 milliGRAM(s) Oral daily  mirtazapine 7.5 milliGRAM(s) Oral at bedtime  multivitamin 1 Tablet(s) Oral daily  pantoprazole  Injectable 40 milliGRAM(s) IV Push every 12 hours  potassium phosphate / sodium phosphate Tablet (K-PHOS No. 2) 1 Tablet(s) Oral two times a day  sertraline 100 milliGRAM(s) Oral daily  sodium chloride 1 Gram(s) Oral daily  sodium chloride 0.9% lock flush 3 milliLiter(s) IV Push every 8 hours  sodium chloride 0.9%. 1000 milliLiter(s) (100 mL/Hr) IV Continuous <Continuous>  spironolactone 25 milliGRAM(s) Oral daily  trimethoprim  160 mG/sulfamethoxazole 800 mG 1 Tablet(s) Oral daily  vancomycin    Solution 125 milliGRAM(s) Oral every 12 hours    MEDICATIONS  (PRN):  acetaminophen     Tablet .. 650 milliGRAM(s) Oral every 6 hours PRN Temp greater or equal to 38C (100.4F), Mild Pain (1 - 3), Moderate Pain (4 - 6)      CAPILLARY BLOOD GLUCOSE    POCT Blood Glucose.: 99 mg/dL (29 Dec 2021 08:22)  POCT Blood Glucose.: 99 mg/dL (28 Dec 2021 21:10)  POCT Blood Glucose.: 124 mg/dL (28 Dec 2021 17:13)  POCT Blood Glucose.: 136 mg/dL (28 Dec 2021 13:14)    I&O's Summary    28 Dec 2021 07:01  -  29 Dec 2021 07:00  --------------------------------------------------------  IN: 600 mL / OUT: 1975 mL / NET: -1375 mL      PHYSICAL EXAM:  Vital Signs Last 24 Hrs  T(C): 36.8 (29 Dec 2021 04:00), Max: 37 (28 Dec 2021 12:00)  T(F): 98.3 (29 Dec 2021 04:00), Max: 98.6 (28 Dec 2021 12:00)  HR: 87 (29 Dec 2021 04:00) (81 - 99)  BP: 90/61 (29 Dec 2021 04:00) (90/61 - 95/63)  BP(mean): 71 (29 Dec 2021 04:00) (71 - 74)  RR: 18 (29 Dec 2021 04:00) (18 - 18)  SpO2: 96% (29 Dec 2021 04:00) (96% - 100%)    CONSTITUTIONAL: NAD, well-developed; trach site bandaged c/d/i, clean underneath with clear mucus  RESPIRATORY: Normal respiratory effort; lungs are clear to auscultation bilaterally; on RA  CARDIOVASCULAR: LVAD heard; No lower extremity edema; Peripheral pulses are 2+ bilaterally  ABDOMEN: Nontender to palpation, normoactive bowel sounds, no rebound/guarding; No hepatosplenomegaly  MUSCLOSKELETAL: no clubbing or cyanosis of digits; no joint swelling or tenderness to palpation  PSYCH: A+O to person, place, and time; affect appropriate    LABS:                        9.2    6.84  )-----------( 217      ( 29 Dec 2021 06:38 )             28.6     12-29    133<L>  |  98  |  12  ----------------------------<  82  4.6   |  20<L>  |  0.72    Ca    9.9      29 Dec 2021 06:37  Phos  3.4     12-29  Mg     1.8     12-29    TPro  8.9<H>  /  Alb  3.5  /  TBili  0.2  /  DBili  x   /  AST  17  /  ALT  12  /  AlkPhos  140<H>  12-29

## 2021-12-29 NOTE — PROGRESS NOTE ADULT - ATTENDING COMMENTS
66 yo man with a history of Stage D HF s/p VAD placement ,history of COVID infection in April 2020, history of a prolonged hospitalization in 2021 with recurrent sepsis, pneumonia, intubated/trach x2 cycles , chronic gall bladder disease s/p perc dawn, SMA ischemia requiring a stent placement, cachexia who was discharged to rehab but presented from [facility] on 12/13 with AMS, hypotension, tachycardia found to be COVID (+) + serratia bacteremia. Initially admitted to ICU requiring pressor support, now on floors. Stable pending [facility] acceptance.     Serratia Bacteremia- Was on Cefepime since 12/13 and now on Bactrim  Leukocytosis unclear etiology, possibly reactive due to dexamethasone, now resolved off dexa  COVID19 s/p monoclonal ab, Remdesivir and Dexamethasone currently on room air, no longer requires precautions - will discuss vaccination prior to discharge  HF: intermittent episodes of hypotension. Given poor po intake likely hypovolemic - fluids cautiously prn  Hyponatremia - send labs to evaluate for SIADH, repeat BMP post NS  Intractable Hiccups - on Baclofen  Amiodarone Toxicity - on Methimazole  Full code - palliative following regarding GOC  C diff - on PO Vancomycin  DC planning - still COVID positive 12/26 but per guidelines now off isolation, 10 days post onset of symptoms and medically cleared for discharge to [facility]    Awaiting bed for placement  45 minutes spent on discharge planning

## 2021-12-29 NOTE — PROGRESS NOTE ADULT - PROBLEM SELECTOR PLAN 1
- COVID PCR + 12/13. Recent CXR clear, respiratory status stable on room air   - s/p monoclonal antibodies Regeneron   - s/p 5 day course of Remdesivir  - s/p 10 day course of Dexamethasone  - repeat COVID PCR 12/26, 12/28 positive

## 2021-12-29 NOTE — PROGRESS NOTE ADULT - PROBLEM SELECTOR PLAN 1
- Continue with speed of 9200  - s/p controller change on 12/16   - Daily LDH  - Continue ASA 81 mg daily  - He is off coumadin due to persistent recurrent GI bleeding  - recommend switching PPI to oral

## 2021-12-29 NOTE — PROGRESS NOTE ADULT - ASSESSMENT
66 yo man with a history of VAD placement history of COVID infection in April 2020, history of a prolonged hospitalization in 2021 with recurrent sepsis, pneumonia, intubated/trach x2 cycles , chronic gall bladder disease s/p perc dawn, SMA ischemia requiring a stent placement, cachexia who was discharged to rehab but is presented from [facility] on 12/13 with AMS, hypotensive, tachycardic found to be COVID (+). Pt lethargic and mumbling words required ICU admission and pressor support now titrated off and downgraded to the floor. Pending dispo to [facility].

## 2021-12-30 NOTE — PROGRESS NOTE ADULT - PROBLEM SELECTOR PROBLEM 4
Received refill request for dicyclomine, last cpx was 06/11/2020. rx sent to pharmacy. Note added for pt to schedule a cpx.   -RT   LVAD (left ventricular assist device) present

## 2021-12-30 NOTE — PROGRESS NOTE ADULT - SUBJECTIVE AND OBJECTIVE BOX
**************************  Bre Gomezler  PGY-1 Internal Medicine  [phone]  **************************    Patient is a 66y old  Male who presents with a chief complaint of COVID (+) with AMS and hypotension (29 Dec 2021 11:39)      SUBJECTIVE / OVERNIGHT EVENTS:  On telemetry, NSR  overnight, sinus tach to 130s when ambulating, now 70-90s. Asymptomatic, denies all ROS. Endorsing good appetite and walking with PT.    MEDICATIONS  (STANDING):  aspirin  chewable 81 milliGRAM(s) Oral daily  baclofen 5 milliGRAM(s) Oral every 8 hours  chlorhexidine 2% Cloths 1 Application(s) Topical <User Schedule>  dextrose 50% Injectable 50 milliLiter(s) IV Push every 15 minutes  dextrose 50% Injectable 25 milliLiter(s) IV Push every 15 minutes  enoxaparin Injectable 30 milliGRAM(s) SubCutaneous daily  insulin lispro (ADMELOG) corrective regimen sliding scale   SubCutaneous three times a day before meals  insulin lispro (ADMELOG) corrective regimen sliding scale   SubCutaneous at bedtime  magnesium sulfate  IVPB 2 Gram(s) IV Intermittent once  methimazole 10 milliGRAM(s) Oral daily  metoclopramide Injectable 10 milliGRAM(s) IV Push every 8 hours  metoprolol succinate ER 50 milliGRAM(s) Oral daily  mirtazapine 7.5 milliGRAM(s) Oral at bedtime  multivitamin 1 Tablet(s) Oral daily  pantoprazole    Tablet 40 milliGRAM(s) Oral every 12 hours  potassium phosphate / sodium phosphate Tablet (K-PHOS No. 2) 1 Tablet(s) Oral two times a day  sertraline 100 milliGRAM(s) Oral daily  sodium chloride 1 Gram(s) Oral daily  sodium chloride 0.9% lock flush 3 milliLiter(s) IV Push every 8 hours  sodium chloride 0.9%. 1000 milliLiter(s) (100 mL/Hr) IV Continuous <Continuous>  spironolactone 25 milliGRAM(s) Oral daily  trimethoprim  160 mG/sulfamethoxazole 800 mG 1 Tablet(s) Oral daily  vancomycin    Solution 125 milliGRAM(s) Oral every 12 hours    MEDICATIONS  (PRN):  acetaminophen     Tablet .. 650 milliGRAM(s) Oral every 6 hours PRN Temp greater or equal to 38C (100.4F), Mild Pain (1 - 3), Moderate Pain (4 - 6)      CAPILLARY BLOOD GLUCOSE    POCT Blood Glucose.: 133 mg/dL (30 Dec 2021 08:15)  POCT Blood Glucose.: 167 mg/dL (29 Dec 2021 21:48)  POCT Blood Glucose.: 118 mg/dL (29 Dec 2021 16:57)  POCT Blood Glucose.: 120 mg/dL (29 Dec 2021 12:39)  POCT Blood Glucose.: 461 mg/dL (29 Dec 2021 12:38)    I&O's Summary    29 Dec 2021 07:01  -  30 Dec 2021 07:00  --------------------------------------------------------  IN: 960 mL / OUT: 1625 mL / NET: -665 mL        PHYSICAL EXAM:  Vital Signs Last 24 Hrs  T(C): 36.3 (30 Dec 2021 04:00), Max: 36.7 (29 Dec 2021 13:05)  T(F): 97.3 (30 Dec 2021 04:00), Max: 98.1 (29 Dec 2021 13:05)  HR: 98 (30 Dec 2021 04:00) (85 - 105)  BP: 93/65 (30 Dec 2021 04:00) (90/62 - 100/76)  BP(mean): 74 (30 Dec 2021 04:00) (72 - 81)  RR: 16 (30 Dec 2021 04:00) (16 - 18)  SpO2: 98% (30 Dec 2021 04:00) (96% - 99%)    CONSTITUTIONAL: NAD, well-developed; trach site bandaged c/d/i, clean underneath with clear mucus  RESPIRATORY: Normal respiratory effort; lungs are clear to auscultation bilaterally; on RA  CARDIOVASCULAR: LVAD heard; No lower extremity edema; Peripheral pulses are 2+ bilaterally  ABDOMEN: Nontender to palpation, normoactive bowel sounds, no rebound/guarding; No hepatosplenomegaly  MUSCLOSKELETAL: no clubbing or cyanosis of digits; no joint swelling or tenderness to palpation  PSYCH: A+O to person, place, and time; affect appropriate    LABS:                        9.8    7.20  )-----------( 227      ( 30 Dec 2021 06:47 )             30.7     12-30    129<L>  |  96  |  15  ----------------------------<  96  4.3   |  21<L>  |  0.77    Ca    9.5      30 Dec 2021 06:47  Phos  3.5     12-30  Mg     1.8     12-30    TPro  9.0<H>  /  Alb  3.5  /  TBili  0.2  /  DBili  x   /  AST  16  /  ALT  12  /  AlkPhos  143<H>  12-30

## 2021-12-30 NOTE — PROGRESS NOTE ADULT - ASSESSMENT
64 yo man with a history of VAD placement history of COVID infection in April 2020, history of a prolonged hospitalization in 2021 with recurrent sepsis, pneumonia, intubated/trach x2 cycles , chronic gall bladder disease s/p perc dawn, SMA ischemia requiring a stent placement, cachexia who was discharged to rehab but is presented from [facility] on 12/13 with AMS, hypotensive, tachycardic found to be COVID (+). Pt lethargic and mumbling words required ICU admission and pressor support now titrated off and downgraded to the floor. Pending dispo to [facility].

## 2021-12-30 NOTE — PROGRESS NOTE ADULT - ATTENDING COMMENTS
66 yo man with a history of Stage D HF s/p VAD placement ,history of COVID infection in April 2020, history of a prolonged hospitalization in 2021 with recurrent sepsis, pneumonia, intubated/trach x2 cycles , chronic gall bladder disease s/p perc dawn, SMA ischemia requiring a stent placement, cachexia who was discharged to rehab but presented from [facility] on 12/13 with AMS, hypotension, tachycardia found to be COVID (+) + serratia bacteremia. Initially admitted to ICU requiring pressor support, now on floors. Stable pending [facility] acceptance.     Serratia Bacteremia- Was on Cefepime since 12/13 and now on Bactrim  Leukocytosis unclear etiology, possibly reactive due to dexamethasone, now resolved off dexa  COVID19 s/p monoclonal ab, Remdesivir and Dexamethasone currently on room air, no longer requires precautions - will discuss vaccination prior to discharge  HF: intermittent episodes of hypotension. Given poor po intake likely hypovolemic - fluids cautiously prn  Hyponatremia - send labs to evaluate for SIADH, repeat BMP post NS  Intractable Hiccups - on Baclofen  Amiodarone Toxicity - on Methimazole  Full code - palliative following regarding GOC  C diff - on PO Vancomycin  DC planning - still COVID positive 12/26 but per guidelines now off isolation, 10 days post onset of symptoms and medically cleared for discharge to [facility]    Awaiting bed for placement  45 minutes spent on discharge planning

## 2021-12-30 NOTE — PROVIDER CONTACT NOTE (CHANGE IN STATUS NOTIFICATION) - ASSESSMENT
Patient A&Ox4. VSS. Patient denies chest pain or discomfort.
Patient lying in bed, HR on cardiac monitor 120s. Oral temp 102.8

## 2021-12-30 NOTE — PROVIDER CONTACT NOTE (CHANGE IN STATUS NOTIFICATION) - ACTION/TREATMENT ORDERED:
IV tylenol administered and blood culture sent to the lab.
MD made aware. Will continue to monitor patient as per order.

## 2021-12-31 NOTE — PROGRESS NOTE ADULT - ASSESSMENT
64 yo man with a history of VAD placement history of COVID infection in April 2020, history of a prolonged hospitalization in 2021 with recurrent sepsis, pneumonia, intubated/trach x2 cycles , chronic gall bladder disease s/p perc dawn, SMA ischemia requiring a stent placement, cachexia who was discharged to rehab but is presented from [facility] on 12/13 with AMS, hypotensive, tachycardic found to be COVID (+). Pt lethargic and mumbling words required ICU admission and pressor support now titrated off and downgraded to the floor. Pending dispo to [facility].

## 2021-12-31 NOTE — PROGRESS NOTE ADULT - SUBJECTIVE AND OBJECTIVE BOX
**************************  Bre El  PGY-1 Internal Medicine  [phone]  **************************    Patient is a 66y old  Male who presents with a chief complaint of COVID (+) with AMS and hypotension (30 Dec 2021 08:30)      SUBJECTIVE / OVERNIGHT EVENTS:  Yesterday evening he spiked a fever to 102, but antibiotics were changed back to previous regimen and patient remained afebrile overnight. Endorses ongoing chills and cough, but ROS otherwise negative. On telemetry, yesterday he was 90-120s but overnight and this morning has been NSR 70-90s.    MEDICATIONS  (STANDING):  aspirin  chewable 81 milliGRAM(s) Oral daily  baclofen 5 milliGRAM(s) Oral every 8 hours  cefepime   IVPB 2000 milliGRAM(s) IV Intermittent every 8 hours  chlorhexidine 2% Cloths 1 Application(s) Topical <User Schedule>  dextrose 50% Injectable 50 milliLiter(s) IV Push every 15 minutes  dextrose 50% Injectable 25 milliLiter(s) IV Push every 15 minutes  enoxaparin Injectable 30 milliGRAM(s) SubCutaneous daily  insulin lispro (ADMELOG) corrective regimen sliding scale   SubCutaneous three times a day before meals  insulin lispro (ADMELOG) corrective regimen sliding scale   SubCutaneous at bedtime  methimazole 10 milliGRAM(s) Oral daily  metoclopramide Injectable 10 milliGRAM(s) IV Push every 8 hours  metoprolol succinate ER 50 milliGRAM(s) Oral daily  mirtazapine 7.5 milliGRAM(s) Oral at bedtime  multivitamin 1 Tablet(s) Oral daily  pantoprazole    Tablet 40 milliGRAM(s) Oral every 12 hours  potassium phosphate / sodium phosphate Tablet (K-PHOS No. 2) 1 Tablet(s) Oral two times a day  sertraline 100 milliGRAM(s) Oral daily  sodium chloride 1 Gram(s) Oral every 12 hours  sodium chloride 0.9% lock flush 3 milliLiter(s) IV Push every 8 hours  sodium chloride 0.9%. 1000 milliLiter(s) (100 mL/Hr) IV Continuous <Continuous>  spironolactone 25 milliGRAM(s) Oral daily  vancomycin    Solution 125 milliGRAM(s) Oral every 12 hours    MEDICATIONS  (PRN):  acetaminophen     Tablet .. 650 milliGRAM(s) Oral every 6 hours PRN Temp greater or equal to 38C (100.4F), Mild Pain (1 - 3), Moderate Pain (4 - 6)      CAPILLARY BLOOD GLUCOSE    POCT Blood Glucose.: 95 mg/dL (31 Dec 2021 08:12)  POCT Blood Glucose.: 179 mg/dL (30 Dec 2021 21:31)  POCT Blood Glucose.: 109 mg/dL (30 Dec 2021 17:02)  POCT Blood Glucose.: 131 mg/dL (30 Dec 2021 12:00)    I&O's Summary    30 Dec 2021 07:01  -  31 Dec 2021 07:00  --------------------------------------------------------  IN: 660 mL / OUT: 1110 mL / NET: -450 mL      PHYSICAL EXAM:  Vital Signs Last 24 Hrs  T(C): 36.7 (31 Dec 2021 08:34), Max: 39.3 (30 Dec 2021 17:50)  T(F): 98 (31 Dec 2021 08:34), Max: 102.8 (30 Dec 2021 17:50)  HR: 93 (31 Dec 2021 04:15) (93 - 120)  BP: 92/64 (31 Dec 2021 08:34) (88/61 - 106/62)  BP(mean): 72 (31 Dec 2021 08:34) (72 - 78)  RR: 18 (31 Dec 2021 08:34) (18 - 18)  SpO2: 97% (31 Dec 2021 08:34) (97% - 99%)    CONSTITUTIONAL: NAD, well-developed; trach site bandaged c/d/i, clean underneath with clear mucus  RESPIRATORY: Normal respiratory effort; lungs are clear to auscultation bilaterally; on RA  CARDIOVASCULAR: LVAD heard; No lower extremity edema; Peripheral pulses are 2+ bilaterally  ABDOMEN: Nontender to palpation, normoactive bowel sounds, no rebound/guarding; No hepatosplenomegaly  MUSCLOSKELETAL: no clubbing or cyanosis of digits; no joint swelling or tenderness to palpation  PSYCH: A+O to person, place, and time; affect appropriate    LABS:                        9.8    13.21 )-----------( 205      ( 31 Dec 2021 05:48 )             29.3     12-31    127<L>  |  95<L>  |  14  ----------------------------<  108<H>  4.5   |  21<L>  |  0.59    Ca    9.4      31 Dec 2021 05:47  Phos  2.9     12-31  Mg     1.9     12-31    TPro  9.0<H>  /  Alb  3.4  /  TBili  0.3  /  DBili  x   /  AST  20  /  ALT  14  /  AlkPhos  156<H>  12-31

## 2021-12-31 NOTE — PROGRESS NOTE ADULT - ATTENDING COMMENTS
64 yo man with a history of Stage D HF s/p VAD placement, history of COVID infection in April 2020, history of a prolonged hospitalization in 2021 with recurrent sepsis, pneumonia, intubated/trach x2 cycles , chronic gall bladder disease s/p perc dawn, SMA ischemia requiring a stent placement, cachexia who was discharged to rehab but presented from [facility] on 12/13 with AMS, hypotension, tachycardia found to be COVID (+) + serratia bacteremia. Initially admitted to ICU requiring pressor support, now on floors. Was febrile again yesterday afternoon while on bactrim, thus abx changed back to Cefepime. Will continue Cefepime for now, f/u cultures. On PO Vancomycin for c diff. Na 127 today, will trial gentle fluid hydration today

## 2021-12-31 NOTE — PROGRESS NOTE ADULT - PROBLEM SELECTOR PLAN 2
- History of recurrent pneumonia and bacteremia  - With stenotrophomonas in prior sputum cultures and enterobacter and serratia in blood in October 2021  - Blood cultures 12/13 + Serratia  - RUQ sonogram negative  - Cefepime 2g IV q8 (12/13-12/27)  - Bactrim started (12/27-12/30)  - Cefepime restarted (12/30- ) for fever on bactrim  - repeat bcx NGTD  - as per ID, will need suppressive treatment of bacteremia x2weeks

## 2022-01-01 ENCOUNTER — INPATIENT (INPATIENT)
Facility: HOSPITAL | Age: 67
LOS: 32 days | DRG: 314 | End: 2022-04-01
Attending: STUDENT IN AN ORGANIZED HEALTH CARE EDUCATION/TRAINING PROGRAM | Admitting: INTERNAL MEDICINE
Payer: COMMERCIAL

## 2022-01-01 ENCOUNTER — TRANSCRIPTION ENCOUNTER (OUTPATIENT)
Age: 67
End: 2022-01-01

## 2022-01-01 ENCOUNTER — APPOINTMENT (OUTPATIENT)
Dept: ELECTROPHYSIOLOGY | Facility: CLINIC | Age: 67
End: 2022-01-01

## 2022-01-01 VITALS — HEIGHT: 70 IN | TEMPERATURE: 101 F | WEIGHT: 107.37 LBS | RESPIRATION RATE: 20 BRPM | HEART RATE: 135 BPM

## 2022-01-01 VITALS — WEIGHT: 107.59 LBS

## 2022-01-01 VITALS — WEIGHT: 115.74 LBS

## 2022-01-01 DIAGNOSIS — A41.9 SEPSIS, UNSPECIFIED ORGANISM: ICD-10-CM

## 2022-01-01 DIAGNOSIS — I50.84 END STAGE HEART FAILURE: ICD-10-CM

## 2022-01-01 DIAGNOSIS — E87.1 HYPO-OSMOLALITY AND HYPONATREMIA: ICD-10-CM

## 2022-01-01 DIAGNOSIS — R41.82 ALTERED MENTAL STATUS, UNSPECIFIED: ICD-10-CM

## 2022-01-01 DIAGNOSIS — D64.9 ANEMIA, UNSPECIFIED: ICD-10-CM

## 2022-01-01 DIAGNOSIS — E87.0 HYPEROSMOLALITY AND HYPERNATREMIA: ICD-10-CM

## 2022-01-01 DIAGNOSIS — J39.8 OTHER SPECIFIED DISEASES OF UPPER RESPIRATORY TRACT: ICD-10-CM

## 2022-01-01 DIAGNOSIS — R45.89 OTHER SYMPTOMS AND SIGNS INVOLVING EMOTIONAL STATE: ICD-10-CM

## 2022-01-01 DIAGNOSIS — E87.5 HYPERKALEMIA: ICD-10-CM

## 2022-01-01 DIAGNOSIS — Z51.5 ENCOUNTER FOR PALLIATIVE CARE: ICD-10-CM

## 2022-01-01 DIAGNOSIS — D73.3 ABSCESS OF SPLEEN: ICD-10-CM

## 2022-01-01 DIAGNOSIS — J69.0 PNEUMONITIS DUE TO INHALATION OF FOOD AND VOMIT: ICD-10-CM

## 2022-01-01 DIAGNOSIS — Z71.89 OTHER SPECIFIED COUNSELING: ICD-10-CM

## 2022-01-01 DIAGNOSIS — E05.90 THYROTOXICOSIS, UNSPECIFIED WITHOUT THYROTOXIC CRISIS OR STORM: ICD-10-CM

## 2022-01-01 DIAGNOSIS — D72.829 ELEVATED WHITE BLOOD CELL COUNT, UNSPECIFIED: ICD-10-CM

## 2022-01-01 DIAGNOSIS — G93.41 METABOLIC ENCEPHALOPATHY: ICD-10-CM
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
## 2022-01-01 DIAGNOSIS — R10.11 RIGHT UPPER QUADRANT PAIN: ICD-10-CM

## 2022-01-01 DIAGNOSIS — Z02.9 ENCOUNTER FOR ADMINISTRATIVE EXAMINATIONS, UNSPECIFIED: ICD-10-CM

## 2022-01-01 DIAGNOSIS — J95.04 TRACHEO-ESOPHAGEAL FISTULA FOLLOWING TRACHEOSTOMY: ICD-10-CM

## 2022-01-01 DIAGNOSIS — R00.0 TACHYCARDIA, UNSPECIFIED: ICD-10-CM

## 2022-01-01 DIAGNOSIS — Z98.890 OTHER SPECIFIED POSTPROCEDURAL STATES: Chronic | ICD-10-CM

## 2022-01-01 DIAGNOSIS — R52 PAIN, UNSPECIFIED: ICD-10-CM

## 2022-01-01 DIAGNOSIS — R78.81 BACTEREMIA: ICD-10-CM

## 2022-01-01 DIAGNOSIS — I42.8 OTHER CARDIOMYOPATHIES: ICD-10-CM

## 2022-01-01 DIAGNOSIS — N17.9 ACUTE KIDNEY FAILURE, UNSPECIFIED: ICD-10-CM

## 2022-01-01 DIAGNOSIS — E46 UNSPECIFIED PROTEIN-CALORIE MALNUTRITION: ICD-10-CM

## 2022-01-01 DIAGNOSIS — L02.91 CUTANEOUS ABSCESS, UNSPECIFIED: ICD-10-CM

## 2022-01-01 DIAGNOSIS — Z95.811 PRESENCE OF HEART ASSIST DEVICE: ICD-10-CM

## 2022-01-01 DIAGNOSIS — Z95.811 PRESENCE OF HEART ASSIST DEVICE: Chronic | ICD-10-CM

## 2022-01-01 DIAGNOSIS — E03.2 HYPOTHYROIDISM DUE TO MEDICAMENTS AND OTHER EXOGENOUS SUBSTANCES: ICD-10-CM

## 2022-01-01 DIAGNOSIS — I47.2 VENTRICULAR TACHYCARDIA: ICD-10-CM

## 2022-01-01 DIAGNOSIS — R13.10 DYSPHAGIA, UNSPECIFIED: ICD-10-CM

## 2022-01-01 DIAGNOSIS — G93.40 ENCEPHALOPATHY, UNSPECIFIED: ICD-10-CM

## 2022-01-01 DIAGNOSIS — R63.0 ANOREXIA: ICD-10-CM

## 2022-01-01 LAB
-  AMIKACIN: SIGNIFICANT CHANGE UP
-  AMIKACIN: SIGNIFICANT CHANGE UP
-  AMPICILLIN/SULBACTAM: SIGNIFICANT CHANGE UP
-  AMPICILLIN/SULBACTAM: SIGNIFICANT CHANGE UP
-  AMPICILLIN: SIGNIFICANT CHANGE UP
-  AMPICILLIN: SIGNIFICANT CHANGE UP
-  AZTREONAM: SIGNIFICANT CHANGE UP
-  AZTREONAM: SIGNIFICANT CHANGE UP
-  CEFAZOLIN: SIGNIFICANT CHANGE UP
-  CEFAZOLIN: SIGNIFICANT CHANGE UP
-  CEFEPIME: SIGNIFICANT CHANGE UP
-  CEFEPIME: SIGNIFICANT CHANGE UP
-  CEFOXITIN: SIGNIFICANT CHANGE UP
-  CEFOXITIN: SIGNIFICANT CHANGE UP
-  CEFTRIAXONE: SIGNIFICANT CHANGE UP
-  CEFTRIAXONE: SIGNIFICANT CHANGE UP
-  CIPROFLOXACIN: SIGNIFICANT CHANGE UP
-  CIPROFLOXACIN: SIGNIFICANT CHANGE UP
-  ERTAPENEM: SIGNIFICANT CHANGE UP
-  ERTAPENEM: SIGNIFICANT CHANGE UP
-  GENTAMICIN: SIGNIFICANT CHANGE UP
-  GENTAMICIN: SIGNIFICANT CHANGE UP
-  LEVOFLOXACIN: SIGNIFICANT CHANGE UP
-  LEVOFLOXACIN: SIGNIFICANT CHANGE UP
-  MEROPENEM: SIGNIFICANT CHANGE UP
-  MEROPENEM: SIGNIFICANT CHANGE UP
-  PIPERACILLIN/TAZOBACTAM: SIGNIFICANT CHANGE UP
-  PIPERACILLIN/TAZOBACTAM: SIGNIFICANT CHANGE UP
-  TOBRAMYCIN: SIGNIFICANT CHANGE UP
-  TOBRAMYCIN: SIGNIFICANT CHANGE UP
-  TRIMETHOPRIM/SULFAMETHOXAZOLE: SIGNIFICANT CHANGE UP
-  TRIMETHOPRIM/SULFAMETHOXAZOLE: SIGNIFICANT CHANGE UP
A1C WITH ESTIMATED AVERAGE GLUCOSE RESULT: 5.3 % — SIGNIFICANT CHANGE UP (ref 4–5.6)
ALBUMIN SERPL ELPH-MCNC: 2.3 G/DL — LOW (ref 3.3–5)
ALBUMIN SERPL ELPH-MCNC: 2.6 G/DL — LOW (ref 3.3–5)
ALBUMIN SERPL ELPH-MCNC: 2.6 G/DL — LOW (ref 3.3–5)
ALBUMIN SERPL ELPH-MCNC: 2.7 G/DL — LOW (ref 3.3–5)
ALBUMIN SERPL ELPH-MCNC: 2.8 G/DL — LOW (ref 3.3–5)
ALBUMIN SERPL ELPH-MCNC: 2.9 G/DL — LOW (ref 3.3–5)
ALBUMIN SERPL ELPH-MCNC: 3 G/DL — LOW (ref 3.3–5)
ALBUMIN SERPL ELPH-MCNC: 3.1 G/DL — LOW (ref 3.3–5)
ALBUMIN SERPL ELPH-MCNC: 3.2 G/DL — LOW (ref 3.3–5)
ALBUMIN SERPL ELPH-MCNC: 3.3 G/DL — SIGNIFICANT CHANGE UP (ref 3.3–5)
ALBUMIN SERPL ELPH-MCNC: 3.4 G/DL — SIGNIFICANT CHANGE UP (ref 3.3–5)
ALBUMIN SERPL ELPH-MCNC: 3.5 G/DL — SIGNIFICANT CHANGE UP (ref 3.3–5)
ALBUMIN SERPL ELPH-MCNC: 3.5 G/DL — SIGNIFICANT CHANGE UP (ref 3.3–5)
ALBUMIN SERPL ELPH-MCNC: 3.6 G/DL — SIGNIFICANT CHANGE UP (ref 3.3–5)
ALBUMIN SERPL ELPH-MCNC: 3.6 G/DL — SIGNIFICANT CHANGE UP (ref 3.3–5)
ALBUMIN SERPL ELPH-MCNC: 3.7 G/DL — SIGNIFICANT CHANGE UP (ref 3.3–5)
ALBUMIN SERPL ELPH-MCNC: 3.8 G/DL — SIGNIFICANT CHANGE UP (ref 3.3–5)
ALBUMIN SERPL ELPH-MCNC: 4 G/DL — SIGNIFICANT CHANGE UP (ref 3.3–5)
ALBUMIN SERPL ELPH-MCNC: SIGNIFICANT CHANGE UP G/DL (ref 3.3–5)
ALP SERPL-CCNC: 116 U/L — SIGNIFICANT CHANGE UP (ref 40–120)
ALP SERPL-CCNC: 120 U/L — SIGNIFICANT CHANGE UP (ref 40–120)
ALP SERPL-CCNC: 123 U/L — HIGH (ref 40–120)
ALP SERPL-CCNC: 128 U/L — HIGH (ref 40–120)
ALP SERPL-CCNC: 128 U/L — HIGH (ref 40–120)
ALP SERPL-CCNC: 129 U/L — HIGH (ref 40–120)
ALP SERPL-CCNC: 130 U/L — HIGH (ref 40–120)
ALP SERPL-CCNC: 131 U/L — HIGH (ref 40–120)
ALP SERPL-CCNC: 132 U/L — HIGH (ref 40–120)
ALP SERPL-CCNC: 132 U/L — HIGH (ref 40–120)
ALP SERPL-CCNC: 133 U/L — HIGH (ref 40–120)
ALP SERPL-CCNC: 135 U/L — HIGH (ref 40–120)
ALP SERPL-CCNC: 135 U/L — HIGH (ref 40–120)
ALP SERPL-CCNC: 139 U/L — HIGH (ref 40–120)
ALP SERPL-CCNC: 140 U/L — HIGH (ref 40–120)
ALP SERPL-CCNC: 141 U/L — HIGH (ref 40–120)
ALP SERPL-CCNC: 142 U/L — HIGH (ref 40–120)
ALP SERPL-CCNC: 142 U/L — HIGH (ref 40–120)
ALP SERPL-CCNC: 143 U/L — HIGH (ref 40–120)
ALP SERPL-CCNC: 144 U/L — HIGH (ref 40–120)
ALP SERPL-CCNC: 146 U/L — HIGH (ref 40–120)
ALP SERPL-CCNC: 156 U/L — HIGH (ref 40–120)
ALP SERPL-CCNC: 158 U/L — HIGH (ref 40–120)
ALP SERPL-CCNC: 166 U/L — HIGH (ref 40–120)
ALP SERPL-CCNC: 166 U/L — HIGH (ref 40–120)
ALP SERPL-CCNC: 168 U/L — HIGH (ref 40–120)
ALP SERPL-CCNC: 170 U/L — HIGH (ref 40–120)
ALP SERPL-CCNC: 178 U/L — HIGH (ref 40–120)
ALP SERPL-CCNC: 178 U/L — HIGH (ref 40–120)
ALP SERPL-CCNC: 206 U/L — HIGH (ref 40–120)
ALP SERPL-CCNC: 207 U/L — HIGH (ref 40–120)
ALP SERPL-CCNC: 210 U/L — HIGH (ref 40–120)
ALP SERPL-CCNC: 220 U/L — HIGH (ref 40–120)
ALP SERPL-CCNC: 242 U/L — HIGH (ref 40–120)
ALP SERPL-CCNC: 242 U/L — HIGH (ref 40–120)
ALP SERPL-CCNC: 250 U/L — HIGH (ref 40–120)
ALP SERPL-CCNC: 315 U/L — HIGH (ref 40–120)
ALP SERPL-CCNC: 318 U/L — HIGH (ref 40–120)
ALP SERPL-CCNC: SIGNIFICANT CHANGE UP (ref 40–120)
ALT FLD-CCNC: 106 U/L — HIGH (ref 10–45)
ALT FLD-CCNC: 11 U/L — SIGNIFICANT CHANGE UP (ref 10–45)
ALT FLD-CCNC: 12 U/L — SIGNIFICANT CHANGE UP (ref 10–45)
ALT FLD-CCNC: 13 U/L — SIGNIFICANT CHANGE UP (ref 10–45)
ALT FLD-CCNC: 14 U/L — SIGNIFICANT CHANGE UP (ref 10–45)
ALT FLD-CCNC: 149 U/L — HIGH (ref 10–45)
ALT FLD-CCNC: 15 U/L — SIGNIFICANT CHANGE UP (ref 10–45)
ALT FLD-CCNC: 15 U/L — SIGNIFICANT CHANGE UP (ref 10–45)
ALT FLD-CCNC: 16 U/L — SIGNIFICANT CHANGE UP (ref 10–45)
ALT FLD-CCNC: 17 U/L — SIGNIFICANT CHANGE UP (ref 10–45)
ALT FLD-CCNC: 17 U/L — SIGNIFICANT CHANGE UP (ref 10–45)
ALT FLD-CCNC: 20 U/L — SIGNIFICANT CHANGE UP (ref 10–45)
ALT FLD-CCNC: 20 U/L — SIGNIFICANT CHANGE UP (ref 10–45)
ALT FLD-CCNC: 21 U/L — SIGNIFICANT CHANGE UP (ref 10–45)
ALT FLD-CCNC: 22 U/L — SIGNIFICANT CHANGE UP (ref 10–45)
ALT FLD-CCNC: 23 U/L — SIGNIFICANT CHANGE UP (ref 10–45)
ALT FLD-CCNC: 24 U/L — SIGNIFICANT CHANGE UP (ref 10–45)
ALT FLD-CCNC: 24 U/L — SIGNIFICANT CHANGE UP (ref 10–45)
ALT FLD-CCNC: 30 U/L — SIGNIFICANT CHANGE UP (ref 10–45)
ALT FLD-CCNC: 40 U/L — SIGNIFICANT CHANGE UP (ref 10–45)
ALT FLD-CCNC: 40 U/L — SIGNIFICANT CHANGE UP (ref 10–45)
ALT FLD-CCNC: 42 U/L — SIGNIFICANT CHANGE UP (ref 10–45)
ALT FLD-CCNC: 47 U/L — HIGH (ref 10–45)
ALT FLD-CCNC: 58 U/L — HIGH (ref 10–45)
ALT FLD-CCNC: 60 U/L — HIGH (ref 10–45)
ALT FLD-CCNC: 68 U/L — HIGH (ref 10–45)
ALT FLD-CCNC: 71 U/L — HIGH (ref 10–45)
ALT FLD-CCNC: 85 U/L — HIGH (ref 10–45)
ALT FLD-CCNC: SIGNIFICANT CHANGE UP U/L (ref 10–45)
AMMONIA BLD-MCNC: 15 UMOL/L — SIGNIFICANT CHANGE UP (ref 11–55)
ANION GAP SERPL CALC-SCNC: 10 MMOL/L — SIGNIFICANT CHANGE UP (ref 5–17)
ANION GAP SERPL CALC-SCNC: 11 MMOL/L — SIGNIFICANT CHANGE UP (ref 5–17)
ANION GAP SERPL CALC-SCNC: 12 MMOL/L — SIGNIFICANT CHANGE UP (ref 5–17)
ANION GAP SERPL CALC-SCNC: 13 MMOL/L — SIGNIFICANT CHANGE UP (ref 5–17)
ANION GAP SERPL CALC-SCNC: 14 MMOL/L — SIGNIFICANT CHANGE UP (ref 5–17)
ANION GAP SERPL CALC-SCNC: 15 MMOL/L — SIGNIFICANT CHANGE UP (ref 5–17)
ANION GAP SERPL CALC-SCNC: 15 MMOL/L — SIGNIFICANT CHANGE UP (ref 5–17)
ANION GAP SERPL CALC-SCNC: 7 MMOL/L — SIGNIFICANT CHANGE UP (ref 5–17)
ANION GAP SERPL CALC-SCNC: 8 MMOL/L — SIGNIFICANT CHANGE UP (ref 5–17)
ANION GAP SERPL CALC-SCNC: 9 MMOL/L — SIGNIFICANT CHANGE UP (ref 5–17)
ANISOCYTOSIS BLD QL: SLIGHT — SIGNIFICANT CHANGE UP
ANISOCYTOSIS BLD QL: SLIGHT — SIGNIFICANT CHANGE UP
APPEARANCE UR: CLEAR — SIGNIFICANT CHANGE UP
APTT BLD: 25 SEC — LOW (ref 27.5–35.5)
APTT BLD: 26.8 SEC — LOW (ref 27.5–35.5)
APTT BLD: 27.8 SEC — SIGNIFICANT CHANGE UP (ref 27.5–35.5)
APTT BLD: 27.9 SEC — SIGNIFICANT CHANGE UP (ref 27.5–35.5)
APTT BLD: 28.2 SEC — SIGNIFICANT CHANGE UP (ref 27.5–35.5)
APTT BLD: 28.6 SEC — SIGNIFICANT CHANGE UP (ref 27.5–35.5)
APTT BLD: 28.7 SEC — SIGNIFICANT CHANGE UP (ref 27.5–35.5)
APTT BLD: 30.7 SEC — SIGNIFICANT CHANGE UP (ref 27.5–35.5)
AST SERPL-CCNC: 101 U/L — HIGH (ref 10–40)
AST SERPL-CCNC: 117 U/L — HIGH (ref 10–40)
AST SERPL-CCNC: 139 U/L — HIGH (ref 10–40)
AST SERPL-CCNC: 15 U/L — SIGNIFICANT CHANGE UP (ref 10–40)
AST SERPL-CCNC: 16 U/L — SIGNIFICANT CHANGE UP (ref 10–40)
AST SERPL-CCNC: 17 U/L — SIGNIFICANT CHANGE UP (ref 10–40)
AST SERPL-CCNC: 18 U/L — SIGNIFICANT CHANGE UP (ref 10–40)
AST SERPL-CCNC: 19 U/L — SIGNIFICANT CHANGE UP (ref 10–40)
AST SERPL-CCNC: 20 U/L — SIGNIFICANT CHANGE UP (ref 10–40)
AST SERPL-CCNC: 205 U/L — HIGH (ref 10–40)
AST SERPL-CCNC: 21 U/L — SIGNIFICANT CHANGE UP (ref 10–40)
AST SERPL-CCNC: 210 U/L — HIGH (ref 10–40)
AST SERPL-CCNC: 22 U/L — SIGNIFICANT CHANGE UP (ref 10–40)
AST SERPL-CCNC: 22 U/L — SIGNIFICANT CHANGE UP (ref 10–40)
AST SERPL-CCNC: 23 U/L — SIGNIFICANT CHANGE UP (ref 10–40)
AST SERPL-CCNC: 24 U/L — SIGNIFICANT CHANGE UP (ref 10–40)
AST SERPL-CCNC: 25 U/L — SIGNIFICANT CHANGE UP (ref 10–40)
AST SERPL-CCNC: 29 U/L — SIGNIFICANT CHANGE UP (ref 10–40)
AST SERPL-CCNC: 30 U/L — SIGNIFICANT CHANGE UP (ref 10–40)
AST SERPL-CCNC: 35 U/L — SIGNIFICANT CHANGE UP (ref 10–40)
AST SERPL-CCNC: 36 U/L — SIGNIFICANT CHANGE UP (ref 10–40)
AST SERPL-CCNC: 40 U/L — SIGNIFICANT CHANGE UP (ref 10–40)
AST SERPL-CCNC: 49 U/L — HIGH (ref 10–40)
AST SERPL-CCNC: 58 U/L — HIGH (ref 10–40)
AST SERPL-CCNC: 65 U/L — HIGH (ref 10–40)
AST SERPL-CCNC: 82 U/L — HIGH (ref 10–40)
AST SERPL-CCNC: 83 U/L — HIGH (ref 10–40)
AST SERPL-CCNC: SIGNIFICANT CHANGE UP U/L (ref 10–40)
BACTERIA # UR AUTO: NEGATIVE — SIGNIFICANT CHANGE UP
BACTERIA # UR AUTO: NEGATIVE — SIGNIFICANT CHANGE UP
BASE EXCESS BLDV CALC-SCNC: -3 MMOL/L — LOW (ref -2–2)
BASE EXCESS BLDV CALC-SCNC: -5 MMOL/L — LOW (ref -2–2)
BASE EXCESS BLDV CALC-SCNC: 4.1 MMOL/L — HIGH (ref -2–2)
BASOPHILS # BLD AUTO: 0 K/UL — SIGNIFICANT CHANGE UP (ref 0–0.2)
BASOPHILS # BLD AUTO: 0 K/UL — SIGNIFICANT CHANGE UP (ref 0–0.2)
BASOPHILS # BLD AUTO: 0.02 K/UL — SIGNIFICANT CHANGE UP (ref 0–0.2)
BASOPHILS # BLD AUTO: 0.03 K/UL — SIGNIFICANT CHANGE UP (ref 0–0.2)
BASOPHILS # BLD AUTO: 0.05 K/UL — SIGNIFICANT CHANGE UP (ref 0–0.2)
BASOPHILS # BLD AUTO: 0.06 K/UL — SIGNIFICANT CHANGE UP (ref 0–0.2)
BASOPHILS # BLD AUTO: 0.07 K/UL — SIGNIFICANT CHANGE UP (ref 0–0.2)
BASOPHILS # BLD AUTO: 0.07 K/UL — SIGNIFICANT CHANGE UP (ref 0–0.2)
BASOPHILS NFR BLD AUTO: 0 % — SIGNIFICANT CHANGE UP (ref 0–2)
BASOPHILS NFR BLD AUTO: 0 % — SIGNIFICANT CHANGE UP (ref 0–2)
BASOPHILS NFR BLD AUTO: 0.1 % — SIGNIFICANT CHANGE UP (ref 0–2)
BASOPHILS NFR BLD AUTO: 0.2 % — SIGNIFICANT CHANGE UP (ref 0–2)
BASOPHILS NFR BLD AUTO: 0.3 % — SIGNIFICANT CHANGE UP (ref 0–2)
BASOPHILS NFR BLD AUTO: 0.3 % — SIGNIFICANT CHANGE UP (ref 0–2)
BASOPHILS NFR BLD AUTO: 0.4 % — SIGNIFICANT CHANGE UP (ref 0–2)
BASOPHILS NFR BLD AUTO: 0.4 % — SIGNIFICANT CHANGE UP (ref 0–2)
BASOPHILS NFR BLD AUTO: 0.6 % — SIGNIFICANT CHANGE UP (ref 0–2)
BASOPHILS NFR BLD AUTO: 0.6 % — SIGNIFICANT CHANGE UP (ref 0–2)
BILIRUB DIRECT SERPL-MCNC: 0.1 MG/DL — SIGNIFICANT CHANGE UP (ref 0–0.3)
BILIRUB DIRECT SERPL-MCNC: 0.1 MG/DL — SIGNIFICANT CHANGE UP (ref 0–0.3)
BILIRUB DIRECT SERPL-MCNC: 0.3 MG/DL — SIGNIFICANT CHANGE UP (ref 0–0.3)
BILIRUB INDIRECT FLD-MCNC: 0.3 MG/DL — SIGNIFICANT CHANGE UP (ref 0.2–1)
BILIRUB INDIRECT FLD-MCNC: 0.3 MG/DL — SIGNIFICANT CHANGE UP (ref 0.2–1)
BILIRUB INDIRECT FLD-MCNC: 0.4 MG/DL — SIGNIFICANT CHANGE UP (ref 0.2–1)
BILIRUB SERPL-MCNC: 0.2 MG/DL — SIGNIFICANT CHANGE UP (ref 0.2–1.2)
BILIRUB SERPL-MCNC: 0.3 MG/DL — SIGNIFICANT CHANGE UP (ref 0.2–1.2)
BILIRUB SERPL-MCNC: 0.4 MG/DL — SIGNIFICANT CHANGE UP (ref 0.2–1.2)
BILIRUB SERPL-MCNC: 0.5 MG/DL — SIGNIFICANT CHANGE UP (ref 0.2–1.2)
BILIRUB SERPL-MCNC: 0.6 MG/DL — SIGNIFICANT CHANGE UP (ref 0.2–1.2)
BILIRUB SERPL-MCNC: 0.7 MG/DL — SIGNIFICANT CHANGE UP (ref 0.2–1.2)
BILIRUB SERPL-MCNC: SIGNIFICANT CHANGE UP MG/DL (ref 0.2–1.2)
BILIRUB UR-MCNC: NEGATIVE — SIGNIFICANT CHANGE UP
BILIRUB UR-MCNC: SIGNIFICANT CHANGE UP
BLD GP AB SCN SERPL QL: NEGATIVE — SIGNIFICANT CHANGE UP
BUN SERPL-MCNC: 10 MG/DL — SIGNIFICANT CHANGE UP (ref 7–23)
BUN SERPL-MCNC: 11 MG/DL — SIGNIFICANT CHANGE UP (ref 7–23)
BUN SERPL-MCNC: 12 MG/DL — SIGNIFICANT CHANGE UP (ref 7–23)
BUN SERPL-MCNC: 13 MG/DL — SIGNIFICANT CHANGE UP (ref 7–23)
BUN SERPL-MCNC: 14 MG/DL — SIGNIFICANT CHANGE UP (ref 7–23)
BUN SERPL-MCNC: 15 MG/DL — SIGNIFICANT CHANGE UP (ref 7–23)
BUN SERPL-MCNC: 16 MG/DL — SIGNIFICANT CHANGE UP (ref 7–23)
BUN SERPL-MCNC: 17 MG/DL — SIGNIFICANT CHANGE UP (ref 7–23)
BUN SERPL-MCNC: 18 MG/DL — SIGNIFICANT CHANGE UP (ref 7–23)
BUN SERPL-MCNC: 19 MG/DL — SIGNIFICANT CHANGE UP (ref 7–23)
BUN SERPL-MCNC: 20 MG/DL — SIGNIFICANT CHANGE UP (ref 7–23)
BUN SERPL-MCNC: 23 MG/DL — SIGNIFICANT CHANGE UP (ref 7–23)
BUN SERPL-MCNC: 23 MG/DL — SIGNIFICANT CHANGE UP (ref 7–23)
BUN SERPL-MCNC: 24 MG/DL — HIGH (ref 7–23)
BUN SERPL-MCNC: 24 MG/DL — HIGH (ref 7–23)
BUN SERPL-MCNC: 25 MG/DL — HIGH (ref 7–23)
BUN SERPL-MCNC: 26 MG/DL — HIGH (ref 7–23)
BUN SERPL-MCNC: 26 MG/DL — HIGH (ref 7–23)
BUN SERPL-MCNC: 29 MG/DL — HIGH (ref 7–23)
BUN SERPL-MCNC: 30 MG/DL — HIGH (ref 7–23)
BUN SERPL-MCNC: 31 MG/DL — HIGH (ref 7–23)
BUN SERPL-MCNC: 32 MG/DL — HIGH (ref 7–23)
BUN SERPL-MCNC: 33 MG/DL — HIGH (ref 7–23)
BUN SERPL-MCNC: 33 MG/DL — HIGH (ref 7–23)
BUN SERPL-MCNC: 34 MG/DL — HIGH (ref 7–23)
BUN SERPL-MCNC: 36 MG/DL — HIGH (ref 7–23)
BUN SERPL-MCNC: 36 MG/DL — HIGH (ref 7–23)
BUN SERPL-MCNC: 37 MG/DL — HIGH (ref 7–23)
BUN SERPL-MCNC: 38 MG/DL — HIGH (ref 7–23)
BUN SERPL-MCNC: 43 MG/DL — HIGH (ref 7–23)
BUN SERPL-MCNC: 44 MG/DL — HIGH (ref 7–23)
BUN SERPL-MCNC: 45 MG/DL — HIGH (ref 7–23)
BUN SERPL-MCNC: 52 MG/DL — HIGH (ref 7–23)
BUN SERPL-MCNC: 53 MG/DL — HIGH (ref 7–23)
BUN SERPL-MCNC: 58 MG/DL — HIGH (ref 7–23)
BUN SERPL-MCNC: 58 MG/DL — HIGH (ref 7–23)
BUN SERPL-MCNC: 60 MG/DL — HIGH (ref 7–23)
BUN SERPL-MCNC: 61 MG/DL — HIGH (ref 7–23)
BUN SERPL-MCNC: 72 MG/DL — HIGH (ref 7–23)
BURR CELLS BLD QL SMEAR: PRESENT — SIGNIFICANT CHANGE UP
CA-I SERPL-SCNC: 1.26 MMOL/L — SIGNIFICANT CHANGE UP (ref 1.15–1.33)
CA-I SERPL-SCNC: 1.34 MMOL/L — HIGH (ref 1.15–1.33)
CA-I SERPL-SCNC: 1.35 MMOL/L — HIGH (ref 1.15–1.33)
CALCIUM SERPL-MCNC: 10 MG/DL — SIGNIFICANT CHANGE UP (ref 8.4–10.5)
CALCIUM SERPL-MCNC: 10.2 MG/DL — SIGNIFICANT CHANGE UP (ref 8.4–10.5)
CALCIUM SERPL-MCNC: 10.3 MG/DL — SIGNIFICANT CHANGE UP (ref 8.4–10.5)
CALCIUM SERPL-MCNC: 8.4 MG/DL — SIGNIFICANT CHANGE UP (ref 8.4–10.5)
CALCIUM SERPL-MCNC: 8.4 MG/DL — SIGNIFICANT CHANGE UP (ref 8.4–10.5)
CALCIUM SERPL-MCNC: 8.6 MG/DL — SIGNIFICANT CHANGE UP (ref 8.4–10.5)
CALCIUM SERPL-MCNC: 8.7 MG/DL — SIGNIFICANT CHANGE UP (ref 8.4–10.5)
CALCIUM SERPL-MCNC: 8.8 MG/DL — SIGNIFICANT CHANGE UP (ref 8.4–10.5)
CALCIUM SERPL-MCNC: 8.8 MG/DL — SIGNIFICANT CHANGE UP (ref 8.4–10.5)
CALCIUM SERPL-MCNC: 8.9 MG/DL — SIGNIFICANT CHANGE UP (ref 8.4–10.5)
CALCIUM SERPL-MCNC: 9 MG/DL — SIGNIFICANT CHANGE UP (ref 8.4–10.5)
CALCIUM SERPL-MCNC: 9 MG/DL — SIGNIFICANT CHANGE UP (ref 8.4–10.5)
CALCIUM SERPL-MCNC: 9.1 MG/DL — SIGNIFICANT CHANGE UP (ref 8.4–10.5)
CALCIUM SERPL-MCNC: 9.2 MG/DL — SIGNIFICANT CHANGE UP (ref 8.4–10.5)
CALCIUM SERPL-MCNC: 9.3 MG/DL — SIGNIFICANT CHANGE UP (ref 8.4–10.5)
CALCIUM SERPL-MCNC: 9.4 MG/DL — SIGNIFICANT CHANGE UP (ref 8.4–10.5)
CALCIUM SERPL-MCNC: 9.5 MG/DL — SIGNIFICANT CHANGE UP (ref 8.4–10.5)
CALCIUM SERPL-MCNC: 9.6 MG/DL — SIGNIFICANT CHANGE UP (ref 8.4–10.5)
CALCIUM SERPL-MCNC: 9.7 MG/DL — SIGNIFICANT CHANGE UP (ref 8.4–10.5)
CALCIUM SERPL-MCNC: 9.8 MG/DL — SIGNIFICANT CHANGE UP (ref 8.4–10.5)
CALCIUM SERPL-MCNC: 9.9 MG/DL — SIGNIFICANT CHANGE UP (ref 8.4–10.5)
CALCIUM UR-MCNC: 2.2 MG/DL — SIGNIFICANT CHANGE UP
CHLORIDE BLDV-SCNC: 103 MMOL/L — SIGNIFICANT CHANGE UP (ref 96–108)
CHLORIDE BLDV-SCNC: 111 MMOL/L — HIGH (ref 96–108)
CHLORIDE BLDV-SCNC: 112 MMOL/L — HIGH (ref 96–108)
CHLORIDE SERPL-SCNC: 100 MMOL/L — SIGNIFICANT CHANGE UP (ref 96–108)
CHLORIDE SERPL-SCNC: 101 MMOL/L — SIGNIFICANT CHANGE UP (ref 96–108)
CHLORIDE SERPL-SCNC: 102 MMOL/L — SIGNIFICANT CHANGE UP (ref 96–108)
CHLORIDE SERPL-SCNC: 103 MMOL/L — SIGNIFICANT CHANGE UP (ref 96–108)
CHLORIDE SERPL-SCNC: 105 MMOL/L — SIGNIFICANT CHANGE UP (ref 96–108)
CHLORIDE SERPL-SCNC: 105 MMOL/L — SIGNIFICANT CHANGE UP (ref 96–108)
CHLORIDE SERPL-SCNC: 106 MMOL/L — SIGNIFICANT CHANGE UP (ref 96–108)
CHLORIDE SERPL-SCNC: 107 MMOL/L — SIGNIFICANT CHANGE UP (ref 96–108)
CHLORIDE SERPL-SCNC: 108 MMOL/L — SIGNIFICANT CHANGE UP (ref 96–108)
CHLORIDE SERPL-SCNC: 109 MMOL/L — HIGH (ref 96–108)
CHLORIDE SERPL-SCNC: 110 MMOL/L — HIGH (ref 96–108)
CHLORIDE SERPL-SCNC: 112 MMOL/L — HIGH (ref 96–108)
CHLORIDE SERPL-SCNC: 112 MMOL/L — HIGH (ref 96–108)
CHLORIDE SERPL-SCNC: 92 MMOL/L — LOW (ref 96–108)
CHLORIDE SERPL-SCNC: 92 MMOL/L — LOW (ref 96–108)
CHLORIDE SERPL-SCNC: 93 MMOL/L — LOW (ref 96–108)
CHLORIDE SERPL-SCNC: 94 MMOL/L — LOW (ref 96–108)
CHLORIDE SERPL-SCNC: 95 MMOL/L — LOW (ref 96–108)
CHLORIDE SERPL-SCNC: 96 MMOL/L — SIGNIFICANT CHANGE UP (ref 96–108)
CHLORIDE SERPL-SCNC: 97 MMOL/L — SIGNIFICANT CHANGE UP (ref 96–108)
CHLORIDE SERPL-SCNC: 98 MMOL/L — SIGNIFICANT CHANGE UP (ref 96–108)
CHLORIDE SERPL-SCNC: 99 MMOL/L — SIGNIFICANT CHANGE UP (ref 96–108)
CHLORIDE UR-SCNC: 58 MMOL/L — SIGNIFICANT CHANGE UP
CK MB BLD-MCNC: 3.3 % — SIGNIFICANT CHANGE UP (ref 0–3.5)
CK MB CFR SERPL CALC: 1.5 NG/ML — SIGNIFICANT CHANGE UP (ref 0–6.7)
CK SERPL-CCNC: 46 U/L — SIGNIFICANT CHANGE UP (ref 30–200)
CO2 BLDV-SCNC: 22 MMOL/L — SIGNIFICANT CHANGE UP (ref 22–26)
CO2 BLDV-SCNC: 24 MMOL/L — SIGNIFICANT CHANGE UP (ref 22–26)
CO2 BLDV-SCNC: 31 MMOL/L — HIGH (ref 22–26)
CO2 SERPL-SCNC: 17 MMOL/L — LOW (ref 22–31)
CO2 SERPL-SCNC: 18 MMOL/L — LOW (ref 22–31)
CO2 SERPL-SCNC: 19 MMOL/L — LOW (ref 22–31)
CO2 SERPL-SCNC: 19 MMOL/L — LOW (ref 22–31)
CO2 SERPL-SCNC: 20 MMOL/L — LOW (ref 22–31)
CO2 SERPL-SCNC: 21 MMOL/L — LOW (ref 22–31)
CO2 SERPL-SCNC: 22 MMOL/L — SIGNIFICANT CHANGE UP (ref 22–31)
CO2 SERPL-SCNC: 23 MMOL/L — SIGNIFICANT CHANGE UP (ref 22–31)
CO2 SERPL-SCNC: 24 MMOL/L — SIGNIFICANT CHANGE UP (ref 22–31)
CO2 SERPL-SCNC: 25 MMOL/L — SIGNIFICANT CHANGE UP (ref 22–31)
CO2 SERPL-SCNC: 26 MMOL/L — SIGNIFICANT CHANGE UP (ref 22–31)
CO2 SERPL-SCNC: 27 MMOL/L — SIGNIFICANT CHANGE UP (ref 22–31)
CO2 SERPL-SCNC: 27 MMOL/L — SIGNIFICANT CHANGE UP (ref 22–31)
COLOR SPEC: SIGNIFICANT CHANGE UP
COLOR SPEC: YELLOW — SIGNIFICANT CHANGE UP
CORTIS AM PEAK SERPL-MCNC: 18.4 UG/DL — SIGNIFICANT CHANGE UP (ref 6–18.4)
CREAT ?TM UR-MCNC: 45 MG/DL — SIGNIFICANT CHANGE UP
CREAT SERPL-MCNC: 0.62 MG/DL — SIGNIFICANT CHANGE UP (ref 0.5–1.3)
CREAT SERPL-MCNC: 0.63 MG/DL — SIGNIFICANT CHANGE UP (ref 0.5–1.3)
CREAT SERPL-MCNC: 0.64 MG/DL — SIGNIFICANT CHANGE UP (ref 0.5–1.3)
CREAT SERPL-MCNC: 0.66 MG/DL — SIGNIFICANT CHANGE UP (ref 0.5–1.3)
CREAT SERPL-MCNC: 0.66 MG/DL — SIGNIFICANT CHANGE UP (ref 0.5–1.3)
CREAT SERPL-MCNC: 0.69 MG/DL — SIGNIFICANT CHANGE UP (ref 0.5–1.3)
CREAT SERPL-MCNC: 0.71 MG/DL — SIGNIFICANT CHANGE UP (ref 0.5–1.3)
CREAT SERPL-MCNC: 0.71 MG/DL — SIGNIFICANT CHANGE UP (ref 0.5–1.3)
CREAT SERPL-MCNC: 0.74 MG/DL — SIGNIFICANT CHANGE UP (ref 0.5–1.3)
CREAT SERPL-MCNC: 0.75 MG/DL — SIGNIFICANT CHANGE UP (ref 0.5–1.3)
CREAT SERPL-MCNC: 0.76 MG/DL — SIGNIFICANT CHANGE UP (ref 0.5–1.3)
CREAT SERPL-MCNC: 0.78 MG/DL — SIGNIFICANT CHANGE UP (ref 0.5–1.3)
CREAT SERPL-MCNC: 0.79 MG/DL — SIGNIFICANT CHANGE UP (ref 0.5–1.3)
CREAT SERPL-MCNC: 0.8 MG/DL — SIGNIFICANT CHANGE UP (ref 0.5–1.3)
CREAT SERPL-MCNC: 0.81 MG/DL — SIGNIFICANT CHANGE UP (ref 0.5–1.3)
CREAT SERPL-MCNC: 0.81 MG/DL — SIGNIFICANT CHANGE UP (ref 0.5–1.3)
CREAT SERPL-MCNC: 0.84 MG/DL — SIGNIFICANT CHANGE UP (ref 0.5–1.3)
CREAT SERPL-MCNC: 0.86 MG/DL — SIGNIFICANT CHANGE UP (ref 0.5–1.3)
CREAT SERPL-MCNC: 0.87 MG/DL — SIGNIFICANT CHANGE UP (ref 0.5–1.3)
CREAT SERPL-MCNC: 0.87 MG/DL — SIGNIFICANT CHANGE UP (ref 0.5–1.3)
CREAT SERPL-MCNC: 0.88 MG/DL — SIGNIFICANT CHANGE UP (ref 0.5–1.3)
CREAT SERPL-MCNC: 0.88 MG/DL — SIGNIFICANT CHANGE UP (ref 0.5–1.3)
CREAT SERPL-MCNC: 0.89 MG/DL — SIGNIFICANT CHANGE UP (ref 0.5–1.3)
CREAT SERPL-MCNC: 0.9 MG/DL — SIGNIFICANT CHANGE UP (ref 0.5–1.3)
CREAT SERPL-MCNC: 0.9 MG/DL — SIGNIFICANT CHANGE UP (ref 0.5–1.3)
CREAT SERPL-MCNC: 0.91 MG/DL — SIGNIFICANT CHANGE UP (ref 0.5–1.3)
CREAT SERPL-MCNC: 0.91 MG/DL — SIGNIFICANT CHANGE UP (ref 0.5–1.3)
CREAT SERPL-MCNC: 0.92 MG/DL — SIGNIFICANT CHANGE UP (ref 0.5–1.3)
CREAT SERPL-MCNC: 0.92 MG/DL — SIGNIFICANT CHANGE UP (ref 0.5–1.3)
CREAT SERPL-MCNC: 0.93 MG/DL — SIGNIFICANT CHANGE UP (ref 0.5–1.3)
CREAT SERPL-MCNC: 0.93 MG/DL — SIGNIFICANT CHANGE UP (ref 0.5–1.3)
CREAT SERPL-MCNC: 0.94 MG/DL — SIGNIFICANT CHANGE UP (ref 0.5–1.3)
CREAT SERPL-MCNC: 0.95 MG/DL — SIGNIFICANT CHANGE UP (ref 0.5–1.3)
CREAT SERPL-MCNC: 0.95 MG/DL — SIGNIFICANT CHANGE UP (ref 0.5–1.3)
CREAT SERPL-MCNC: 0.96 MG/DL — SIGNIFICANT CHANGE UP (ref 0.5–1.3)
CREAT SERPL-MCNC: 0.97 MG/DL — SIGNIFICANT CHANGE UP (ref 0.5–1.3)
CREAT SERPL-MCNC: 0.98 MG/DL — SIGNIFICANT CHANGE UP (ref 0.5–1.3)
CREAT SERPL-MCNC: 0.99 MG/DL — SIGNIFICANT CHANGE UP (ref 0.5–1.3)
CREAT SERPL-MCNC: 1 MG/DL — SIGNIFICANT CHANGE UP (ref 0.5–1.3)
CREAT SERPL-MCNC: 1 MG/DL — SIGNIFICANT CHANGE UP (ref 0.5–1.3)
CREAT SERPL-MCNC: 1.01 MG/DL — SIGNIFICANT CHANGE UP (ref 0.5–1.3)
CREAT SERPL-MCNC: 1.02 MG/DL — SIGNIFICANT CHANGE UP (ref 0.5–1.3)
CREAT SERPL-MCNC: 1.02 MG/DL — SIGNIFICANT CHANGE UP (ref 0.5–1.3)
CREAT SERPL-MCNC: 1.03 MG/DL — SIGNIFICANT CHANGE UP (ref 0.5–1.3)
CREAT SERPL-MCNC: 1.04 MG/DL — SIGNIFICANT CHANGE UP (ref 0.5–1.3)
CREAT SERPL-MCNC: 1.05 MG/DL — SIGNIFICANT CHANGE UP (ref 0.5–1.3)
CREAT SERPL-MCNC: 1.06 MG/DL — SIGNIFICANT CHANGE UP (ref 0.5–1.3)
CREAT SERPL-MCNC: 1.07 MG/DL — SIGNIFICANT CHANGE UP (ref 0.5–1.3)
CREAT SERPL-MCNC: 1.11 MG/DL — SIGNIFICANT CHANGE UP (ref 0.5–1.3)
CREAT SERPL-MCNC: 1.12 MG/DL — SIGNIFICANT CHANGE UP (ref 0.5–1.3)
CREAT SERPL-MCNC: 1.12 MG/DL — SIGNIFICANT CHANGE UP (ref 0.5–1.3)
CREAT SERPL-MCNC: 1.14 MG/DL — SIGNIFICANT CHANGE UP (ref 0.5–1.3)
CREAT SERPL-MCNC: 1.2 MG/DL — SIGNIFICANT CHANGE UP (ref 0.5–1.3)
CREAT SERPL-MCNC: 1.2 MG/DL — SIGNIFICANT CHANGE UP (ref 0.5–1.3)
CREAT SERPL-MCNC: 1.31 MG/DL — HIGH (ref 0.5–1.3)
CULTURE RESULTS: SIGNIFICANT CHANGE UP
DACRYOCYTES BLD QL SMEAR: SLIGHT — SIGNIFICANT CHANGE UP
DACRYOCYTES BLD QL SMEAR: SLIGHT — SIGNIFICANT CHANGE UP
DIFF PNL FLD: ABNORMAL
DIFF PNL FLD: SIGNIFICANT CHANGE UP
EGFR: 100 ML/MIN/1.73M2 — SIGNIFICANT CHANGE UP
EGFR: 103 ML/MIN/1.73M2 — SIGNIFICANT CHANGE UP
EGFR: 103 ML/MIN/1.73M2 — SIGNIFICANT CHANGE UP
EGFR: 104 ML/MIN/1.73M2 — SIGNIFICANT CHANGE UP
EGFR: 104 ML/MIN/1.73M2 — SIGNIFICANT CHANGE UP
EGFR: 105 ML/MIN/1.73M2 — SIGNIFICANT CHANGE UP
EGFR: 67 ML/MIN/1.73M2 — SIGNIFICANT CHANGE UP
EGFR: 67 ML/MIN/1.73M2 — SIGNIFICANT CHANGE UP
EGFR: 73 ML/MIN/1.73M2 — SIGNIFICANT CHANGE UP
EGFR: 73 ML/MIN/1.73M2 — SIGNIFICANT CHANGE UP
EGFR: 78 ML/MIN/1.73M2 — SIGNIFICANT CHANGE UP
EGFR: 79 ML/MIN/1.73M2 — SIGNIFICANT CHANGE UP
EGFR: 80 ML/MIN/1.73M2 — SIGNIFICANT CHANGE UP
EGFR: 82 ML/MIN/1.73M2 — SIGNIFICANT CHANGE UP
EGFR: 83 ML/MIN/1.73M2 — SIGNIFICANT CHANGE UP
EGFR: 84 ML/MIN/1.73M2 — SIGNIFICANT CHANGE UP
EGFR: 85 ML/MIN/1.73M2 — SIGNIFICANT CHANGE UP
EGFR: 86 ML/MIN/1.73M2 — SIGNIFICANT CHANGE UP
EGFR: 86 ML/MIN/1.73M2 — SIGNIFICANT CHANGE UP
EGFR: 87 ML/MIN/1.73M2 — SIGNIFICANT CHANGE UP
EGFR: 87 ML/MIN/1.73M2 — SIGNIFICANT CHANGE UP
EGFR: 88 ML/MIN/1.73M2 — SIGNIFICANT CHANGE UP
EGFR: 90 ML/MIN/1.73M2 — SIGNIFICANT CHANGE UP
EGFR: 92 ML/MIN/1.73M2 — SIGNIFICANT CHANGE UP
EGFR: 94 ML/MIN/1.73M2 — SIGNIFICANT CHANGE UP
EGFR: 96 ML/MIN/1.73M2 — SIGNIFICANT CHANGE UP
EGFR: 98 ML/MIN/1.73M2 — SIGNIFICANT CHANGE UP
ELLIPTOCYTES BLD QL SMEAR: SLIGHT — SIGNIFICANT CHANGE UP
ELLIPTOCYTES BLD QL SMEAR: SLIGHT — SIGNIFICANT CHANGE UP
EOSINOPHIL # BLD AUTO: 0 K/UL — SIGNIFICANT CHANGE UP (ref 0–0.5)
EOSINOPHIL # BLD AUTO: 0.01 K/UL — SIGNIFICANT CHANGE UP (ref 0–0.5)
EOSINOPHIL # BLD AUTO: 0.13 K/UL — SIGNIFICANT CHANGE UP (ref 0–0.5)
EOSINOPHIL # BLD AUTO: 0.16 K/UL — SIGNIFICANT CHANGE UP (ref 0–0.5)
EOSINOPHIL # BLD AUTO: 0.17 K/UL — SIGNIFICANT CHANGE UP (ref 0–0.5)
EOSINOPHIL # BLD AUTO: 0.17 K/UL — SIGNIFICANT CHANGE UP (ref 0–0.5)
EOSINOPHIL # BLD AUTO: 0.19 K/UL — SIGNIFICANT CHANGE UP (ref 0–0.5)
EOSINOPHIL # BLD AUTO: 0.21 K/UL — SIGNIFICANT CHANGE UP (ref 0–0.5)
EOSINOPHIL # BLD AUTO: 0.26 K/UL — SIGNIFICANT CHANGE UP (ref 0–0.5)
EOSINOPHIL # BLD AUTO: 0.28 K/UL — SIGNIFICANT CHANGE UP (ref 0–0.5)
EOSINOPHIL # BLD AUTO: 0.29 K/UL — SIGNIFICANT CHANGE UP (ref 0–0.5)
EOSINOPHIL # BLD AUTO: 0.29 K/UL — SIGNIFICANT CHANGE UP (ref 0–0.5)
EOSINOPHIL # BLD AUTO: 0.3 K/UL — SIGNIFICANT CHANGE UP (ref 0–0.5)
EOSINOPHIL # BLD AUTO: 0.31 K/UL — SIGNIFICANT CHANGE UP (ref 0–0.5)
EOSINOPHIL # BLD AUTO: 0.35 K/UL — SIGNIFICANT CHANGE UP (ref 0–0.5)
EOSINOPHIL # BLD AUTO: 0.41 K/UL — SIGNIFICANT CHANGE UP (ref 0–0.5)
EOSINOPHIL # BLD AUTO: 0.44 K/UL — SIGNIFICANT CHANGE UP (ref 0–0.5)
EOSINOPHIL # BLD AUTO: 0.49 K/UL — SIGNIFICANT CHANGE UP (ref 0–0.5)
EOSINOPHIL NFR BLD AUTO: 0 % — SIGNIFICANT CHANGE UP (ref 0–6)
EOSINOPHIL NFR BLD AUTO: 0 % — SIGNIFICANT CHANGE UP (ref 0–6)
EOSINOPHIL NFR BLD AUTO: 0.6 % — SIGNIFICANT CHANGE UP (ref 0–6)
EOSINOPHIL NFR BLD AUTO: 0.7 % — SIGNIFICANT CHANGE UP (ref 0–6)
EOSINOPHIL NFR BLD AUTO: 0.8 % — SIGNIFICANT CHANGE UP (ref 0–6)
EOSINOPHIL NFR BLD AUTO: 1 % — SIGNIFICANT CHANGE UP (ref 0–6)
EOSINOPHIL NFR BLD AUTO: 1.8 % — SIGNIFICANT CHANGE UP (ref 0–6)
EOSINOPHIL NFR BLD AUTO: 1.9 % — SIGNIFICANT CHANGE UP (ref 0–6)
EOSINOPHIL NFR BLD AUTO: 2 % — SIGNIFICANT CHANGE UP (ref 0–6)
EOSINOPHIL NFR BLD AUTO: 2.1 % — SIGNIFICANT CHANGE UP (ref 0–6)
EOSINOPHIL NFR BLD AUTO: 2.1 % — SIGNIFICANT CHANGE UP (ref 0–6)
EOSINOPHIL NFR BLD AUTO: 2.2 % — SIGNIFICANT CHANGE UP (ref 0–6)
EOSINOPHIL NFR BLD AUTO: 2.5 % — SIGNIFICANT CHANGE UP (ref 0–6)
EOSINOPHIL NFR BLD AUTO: 2.8 % — SIGNIFICANT CHANGE UP (ref 0–6)
EOSINOPHIL NFR BLD AUTO: 3.1 % — SIGNIFICANT CHANGE UP (ref 0–6)
EOSINOPHIL NFR BLD AUTO: 3.2 % — SIGNIFICANT CHANGE UP (ref 0–6)
EPI CELLS # UR: 3 /HPF — SIGNIFICANT CHANGE UP
EPI CELLS # UR: 6 /HPF — HIGH
ERYTHROCYTE [SEDIMENTATION RATE] IN BLOOD: 23 MM/HR — HIGH (ref 0–20)
ERYTHROCYTE [SEDIMENTATION RATE] IN BLOOD: 83 MM/HR — HIGH (ref 0–20)
ESTIMATED AVERAGE GLUCOSE: 105 MG/DL — SIGNIFICANT CHANGE UP (ref 68–114)
FERRITIN SERPL-MCNC: 1828 NG/ML — HIGH (ref 30–400)
FIBRINOGEN PPP-MCNC: 453 MG/DL — SIGNIFICANT CHANGE UP (ref 330–520)
FIBRINOGEN PPP-MCNC: 480 MG/DL — SIGNIFICANT CHANGE UP (ref 330–520)
FIBRINOGEN PPP-MCNC: 484 MG/DL — SIGNIFICANT CHANGE UP (ref 330–520)
FIBRINOGEN PPP-MCNC: 516 MG/DL — SIGNIFICANT CHANGE UP (ref 330–520)
FIBRINOGEN PPP-MCNC: 553 MG/DL — HIGH (ref 330–520)
FOLATE SERPL-MCNC: 13.9 NG/ML — SIGNIFICANT CHANGE UP
GAS PNL BLDA: SIGNIFICANT CHANGE UP
GAS PNL BLDV: 136 MMOL/L — SIGNIFICANT CHANGE UP (ref 136–145)
GAS PNL BLDV: 140 MMOL/L — SIGNIFICANT CHANGE UP (ref 136–145)
GAS PNL BLDV: 142 MMOL/L — SIGNIFICANT CHANGE UP (ref 136–145)
GAS PNL BLDV: SIGNIFICANT CHANGE UP
GLUCOSE BLDC GLUCOMTR-MCNC: 100 MG/DL — HIGH (ref 70–99)
GLUCOSE BLDC GLUCOMTR-MCNC: 102 MG/DL — HIGH (ref 70–99)
GLUCOSE BLDC GLUCOMTR-MCNC: 103 MG/DL — HIGH (ref 70–99)
GLUCOSE BLDC GLUCOMTR-MCNC: 104 MG/DL — HIGH (ref 70–99)
GLUCOSE BLDC GLUCOMTR-MCNC: 106 MG/DL — HIGH (ref 70–99)
GLUCOSE BLDC GLUCOMTR-MCNC: 107 MG/DL — HIGH (ref 70–99)
GLUCOSE BLDC GLUCOMTR-MCNC: 108 MG/DL — HIGH (ref 70–99)
GLUCOSE BLDC GLUCOMTR-MCNC: 109 MG/DL — HIGH (ref 70–99)
GLUCOSE BLDC GLUCOMTR-MCNC: 110 MG/DL — HIGH (ref 70–99)
GLUCOSE BLDC GLUCOMTR-MCNC: 110 MG/DL — HIGH (ref 70–99)
GLUCOSE BLDC GLUCOMTR-MCNC: 111 MG/DL — HIGH (ref 70–99)
GLUCOSE BLDC GLUCOMTR-MCNC: 111 MG/DL — HIGH (ref 70–99)
GLUCOSE BLDC GLUCOMTR-MCNC: 113 MG/DL — HIGH (ref 70–99)
GLUCOSE BLDC GLUCOMTR-MCNC: 115 MG/DL — HIGH (ref 70–99)
GLUCOSE BLDC GLUCOMTR-MCNC: 115 MG/DL — HIGH (ref 70–99)
GLUCOSE BLDC GLUCOMTR-MCNC: 116 MG/DL — HIGH (ref 70–99)
GLUCOSE BLDC GLUCOMTR-MCNC: 117 MG/DL — HIGH (ref 70–99)
GLUCOSE BLDC GLUCOMTR-MCNC: 118 MG/DL — HIGH (ref 70–99)
GLUCOSE BLDC GLUCOMTR-MCNC: 120 MG/DL — HIGH (ref 70–99)
GLUCOSE BLDC GLUCOMTR-MCNC: 121 MG/DL — HIGH (ref 70–99)
GLUCOSE BLDC GLUCOMTR-MCNC: 122 MG/DL — HIGH (ref 70–99)
GLUCOSE BLDC GLUCOMTR-MCNC: 123 MG/DL — HIGH (ref 70–99)
GLUCOSE BLDC GLUCOMTR-MCNC: 124 MG/DL — HIGH (ref 70–99)
GLUCOSE BLDC GLUCOMTR-MCNC: 124 MG/DL — HIGH (ref 70–99)
GLUCOSE BLDC GLUCOMTR-MCNC: 125 MG/DL — HIGH (ref 70–99)
GLUCOSE BLDC GLUCOMTR-MCNC: 126 MG/DL — HIGH (ref 70–99)
GLUCOSE BLDC GLUCOMTR-MCNC: 127 MG/DL — HIGH (ref 70–99)
GLUCOSE BLDC GLUCOMTR-MCNC: 127 MG/DL — HIGH (ref 70–99)
GLUCOSE BLDC GLUCOMTR-MCNC: 128 MG/DL — HIGH (ref 70–99)
GLUCOSE BLDC GLUCOMTR-MCNC: 128 MG/DL — HIGH (ref 70–99)
GLUCOSE BLDC GLUCOMTR-MCNC: 129 MG/DL — HIGH (ref 70–99)
GLUCOSE BLDC GLUCOMTR-MCNC: 130 MG/DL — HIGH (ref 70–99)
GLUCOSE BLDC GLUCOMTR-MCNC: 131 MG/DL — HIGH (ref 70–99)
GLUCOSE BLDC GLUCOMTR-MCNC: 132 MG/DL — HIGH (ref 70–99)
GLUCOSE BLDC GLUCOMTR-MCNC: 133 MG/DL — HIGH (ref 70–99)
GLUCOSE BLDC GLUCOMTR-MCNC: 133 MG/DL — HIGH (ref 70–99)
GLUCOSE BLDC GLUCOMTR-MCNC: 134 MG/DL — HIGH (ref 70–99)
GLUCOSE BLDC GLUCOMTR-MCNC: 135 MG/DL — HIGH (ref 70–99)
GLUCOSE BLDC GLUCOMTR-MCNC: 136 MG/DL — HIGH (ref 70–99)
GLUCOSE BLDC GLUCOMTR-MCNC: 137 MG/DL — HIGH (ref 70–99)
GLUCOSE BLDC GLUCOMTR-MCNC: 138 MG/DL — HIGH (ref 70–99)
GLUCOSE BLDC GLUCOMTR-MCNC: 139 MG/DL — HIGH (ref 70–99)
GLUCOSE BLDC GLUCOMTR-MCNC: 140 MG/DL — HIGH (ref 70–99)
GLUCOSE BLDC GLUCOMTR-MCNC: 140 MG/DL — HIGH (ref 70–99)
GLUCOSE BLDC GLUCOMTR-MCNC: 141 MG/DL — HIGH (ref 70–99)
GLUCOSE BLDC GLUCOMTR-MCNC: 142 MG/DL — HIGH (ref 70–99)
GLUCOSE BLDC GLUCOMTR-MCNC: 143 MG/DL — HIGH (ref 70–99)
GLUCOSE BLDC GLUCOMTR-MCNC: 144 MG/DL — HIGH (ref 70–99)
GLUCOSE BLDC GLUCOMTR-MCNC: 144 MG/DL — HIGH (ref 70–99)
GLUCOSE BLDC GLUCOMTR-MCNC: 145 MG/DL — HIGH (ref 70–99)
GLUCOSE BLDC GLUCOMTR-MCNC: 145 MG/DL — HIGH (ref 70–99)
GLUCOSE BLDC GLUCOMTR-MCNC: 147 MG/DL — HIGH (ref 70–99)
GLUCOSE BLDC GLUCOMTR-MCNC: 147 MG/DL — HIGH (ref 70–99)
GLUCOSE BLDC GLUCOMTR-MCNC: 148 MG/DL — HIGH (ref 70–99)
GLUCOSE BLDC GLUCOMTR-MCNC: 149 MG/DL — HIGH (ref 70–99)
GLUCOSE BLDC GLUCOMTR-MCNC: 150 MG/DL — HIGH (ref 70–99)
GLUCOSE BLDC GLUCOMTR-MCNC: 152 MG/DL — HIGH (ref 70–99)
GLUCOSE BLDC GLUCOMTR-MCNC: 154 MG/DL — HIGH (ref 70–99)
GLUCOSE BLDC GLUCOMTR-MCNC: 155 MG/DL — HIGH (ref 70–99)
GLUCOSE BLDC GLUCOMTR-MCNC: 156 MG/DL — HIGH (ref 70–99)
GLUCOSE BLDC GLUCOMTR-MCNC: 156 MG/DL — HIGH (ref 70–99)
GLUCOSE BLDC GLUCOMTR-MCNC: 159 MG/DL — HIGH (ref 70–99)
GLUCOSE BLDC GLUCOMTR-MCNC: 159 MG/DL — HIGH (ref 70–99)
GLUCOSE BLDC GLUCOMTR-MCNC: 161 MG/DL — HIGH (ref 70–99)
GLUCOSE BLDC GLUCOMTR-MCNC: 163 MG/DL — HIGH (ref 70–99)
GLUCOSE BLDC GLUCOMTR-MCNC: 164 MG/DL — HIGH (ref 70–99)
GLUCOSE BLDC GLUCOMTR-MCNC: 164 MG/DL — HIGH (ref 70–99)
GLUCOSE BLDC GLUCOMTR-MCNC: 165 MG/DL — HIGH (ref 70–99)
GLUCOSE BLDC GLUCOMTR-MCNC: 165 MG/DL — HIGH (ref 70–99)
GLUCOSE BLDC GLUCOMTR-MCNC: 166 MG/DL — HIGH (ref 70–99)
GLUCOSE BLDC GLUCOMTR-MCNC: 167 MG/DL — HIGH (ref 70–99)
GLUCOSE BLDC GLUCOMTR-MCNC: 169 MG/DL — HIGH (ref 70–99)
GLUCOSE BLDC GLUCOMTR-MCNC: 171 MG/DL — HIGH (ref 70–99)
GLUCOSE BLDC GLUCOMTR-MCNC: 171 MG/DL — HIGH (ref 70–99)
GLUCOSE BLDC GLUCOMTR-MCNC: 173 MG/DL — HIGH (ref 70–99)
GLUCOSE BLDC GLUCOMTR-MCNC: 175 MG/DL — HIGH (ref 70–99)
GLUCOSE BLDC GLUCOMTR-MCNC: 175 MG/DL — HIGH (ref 70–99)
GLUCOSE BLDC GLUCOMTR-MCNC: 177 MG/DL — HIGH (ref 70–99)
GLUCOSE BLDC GLUCOMTR-MCNC: 178 MG/DL — HIGH (ref 70–99)
GLUCOSE BLDC GLUCOMTR-MCNC: 178 MG/DL — HIGH (ref 70–99)
GLUCOSE BLDC GLUCOMTR-MCNC: 179 MG/DL — HIGH (ref 70–99)
GLUCOSE BLDC GLUCOMTR-MCNC: 179 MG/DL — HIGH (ref 70–99)
GLUCOSE BLDC GLUCOMTR-MCNC: 181 MG/DL — HIGH (ref 70–99)
GLUCOSE BLDC GLUCOMTR-MCNC: 187 MG/DL — HIGH (ref 70–99)
GLUCOSE BLDC GLUCOMTR-MCNC: 187 MG/DL — HIGH (ref 70–99)
GLUCOSE BLDC GLUCOMTR-MCNC: 188 MG/DL — HIGH (ref 70–99)
GLUCOSE BLDC GLUCOMTR-MCNC: 188 MG/DL — HIGH (ref 70–99)
GLUCOSE BLDC GLUCOMTR-MCNC: 190 MG/DL — HIGH (ref 70–99)
GLUCOSE BLDC GLUCOMTR-MCNC: 191 MG/DL — HIGH (ref 70–99)
GLUCOSE BLDC GLUCOMTR-MCNC: 192 MG/DL — HIGH (ref 70–99)
GLUCOSE BLDC GLUCOMTR-MCNC: 192 MG/DL — HIGH (ref 70–99)
GLUCOSE BLDC GLUCOMTR-MCNC: 193 MG/DL — HIGH (ref 70–99)
GLUCOSE BLDC GLUCOMTR-MCNC: 194 MG/DL — HIGH (ref 70–99)
GLUCOSE BLDC GLUCOMTR-MCNC: 195 MG/DL — HIGH (ref 70–99)
GLUCOSE BLDC GLUCOMTR-MCNC: 196 MG/DL — HIGH (ref 70–99)
GLUCOSE BLDC GLUCOMTR-MCNC: 198 MG/DL — HIGH (ref 70–99)
GLUCOSE BLDC GLUCOMTR-MCNC: 199 MG/DL — HIGH (ref 70–99)
GLUCOSE BLDC GLUCOMTR-MCNC: 201 MG/DL — HIGH (ref 70–99)
GLUCOSE BLDC GLUCOMTR-MCNC: 213 MG/DL — HIGH (ref 70–99)
GLUCOSE BLDC GLUCOMTR-MCNC: 214 MG/DL — HIGH (ref 70–99)
GLUCOSE BLDC GLUCOMTR-MCNC: 227 MG/DL — HIGH (ref 70–99)
GLUCOSE BLDC GLUCOMTR-MCNC: 231 MG/DL — HIGH (ref 70–99)
GLUCOSE BLDC GLUCOMTR-MCNC: 46 MG/DL — CRITICAL LOW (ref 70–99)
GLUCOSE BLDC GLUCOMTR-MCNC: 79 MG/DL — SIGNIFICANT CHANGE UP (ref 70–99)
GLUCOSE BLDC GLUCOMTR-MCNC: 80 MG/DL — SIGNIFICANT CHANGE UP (ref 70–99)
GLUCOSE BLDC GLUCOMTR-MCNC: 83 MG/DL — SIGNIFICANT CHANGE UP (ref 70–99)
GLUCOSE BLDC GLUCOMTR-MCNC: 83 MG/DL — SIGNIFICANT CHANGE UP (ref 70–99)
GLUCOSE BLDC GLUCOMTR-MCNC: 84 MG/DL — SIGNIFICANT CHANGE UP (ref 70–99)
GLUCOSE BLDC GLUCOMTR-MCNC: 85 MG/DL — SIGNIFICANT CHANGE UP (ref 70–99)
GLUCOSE BLDC GLUCOMTR-MCNC: 88 MG/DL — SIGNIFICANT CHANGE UP (ref 70–99)
GLUCOSE BLDC GLUCOMTR-MCNC: 88 MG/DL — SIGNIFICANT CHANGE UP (ref 70–99)
GLUCOSE BLDC GLUCOMTR-MCNC: 89 MG/DL — SIGNIFICANT CHANGE UP (ref 70–99)
GLUCOSE BLDC GLUCOMTR-MCNC: 90 MG/DL — SIGNIFICANT CHANGE UP (ref 70–99)
GLUCOSE BLDC GLUCOMTR-MCNC: 92 MG/DL — SIGNIFICANT CHANGE UP (ref 70–99)
GLUCOSE BLDC GLUCOMTR-MCNC: 92 MG/DL — SIGNIFICANT CHANGE UP (ref 70–99)
GLUCOSE BLDC GLUCOMTR-MCNC: 93 MG/DL — SIGNIFICANT CHANGE UP (ref 70–99)
GLUCOSE BLDC GLUCOMTR-MCNC: 93 MG/DL — SIGNIFICANT CHANGE UP (ref 70–99)
GLUCOSE BLDC GLUCOMTR-MCNC: 96 MG/DL — SIGNIFICANT CHANGE UP (ref 70–99)
GLUCOSE BLDC GLUCOMTR-MCNC: 98 MG/DL — SIGNIFICANT CHANGE UP (ref 70–99)
GLUCOSE BLDV-MCNC: 137 MG/DL — HIGH (ref 70–99)
GLUCOSE BLDV-MCNC: 155 MG/DL — HIGH (ref 70–99)
GLUCOSE BLDV-MCNC: 206 MG/DL — HIGH (ref 70–99)
GLUCOSE SERPL-MCNC: 101 MG/DL — HIGH (ref 70–99)
GLUCOSE SERPL-MCNC: 101 MG/DL — HIGH (ref 70–99)
GLUCOSE SERPL-MCNC: 102 MG/DL — HIGH (ref 70–99)
GLUCOSE SERPL-MCNC: 103 MG/DL — HIGH (ref 70–99)
GLUCOSE SERPL-MCNC: 108 MG/DL — HIGH (ref 70–99)
GLUCOSE SERPL-MCNC: 108 MG/DL — HIGH (ref 70–99)
GLUCOSE SERPL-MCNC: 110 MG/DL — HIGH (ref 70–99)
GLUCOSE SERPL-MCNC: 112 MG/DL — HIGH (ref 70–99)
GLUCOSE SERPL-MCNC: 112 MG/DL — HIGH (ref 70–99)
GLUCOSE SERPL-MCNC: 113 MG/DL — HIGH (ref 70–99)
GLUCOSE SERPL-MCNC: 114 MG/DL — HIGH (ref 70–99)
GLUCOSE SERPL-MCNC: 115 MG/DL — HIGH (ref 70–99)
GLUCOSE SERPL-MCNC: 116 MG/DL — HIGH (ref 70–99)
GLUCOSE SERPL-MCNC: 116 MG/DL — HIGH (ref 70–99)
GLUCOSE SERPL-MCNC: 120 MG/DL — HIGH (ref 70–99)
GLUCOSE SERPL-MCNC: 121 MG/DL — HIGH (ref 70–99)
GLUCOSE SERPL-MCNC: 121 MG/DL — HIGH (ref 70–99)
GLUCOSE SERPL-MCNC: 123 MG/DL — HIGH (ref 70–99)
GLUCOSE SERPL-MCNC: 125 MG/DL — HIGH (ref 70–99)
GLUCOSE SERPL-MCNC: 128 MG/DL — HIGH (ref 70–99)
GLUCOSE SERPL-MCNC: 132 MG/DL — HIGH (ref 70–99)
GLUCOSE SERPL-MCNC: 135 MG/DL — HIGH (ref 70–99)
GLUCOSE SERPL-MCNC: 136 MG/DL — HIGH (ref 70–99)
GLUCOSE SERPL-MCNC: 137 MG/DL — HIGH (ref 70–99)
GLUCOSE SERPL-MCNC: 139 MG/DL — HIGH (ref 70–99)
GLUCOSE SERPL-MCNC: 139 MG/DL — HIGH (ref 70–99)
GLUCOSE SERPL-MCNC: 140 MG/DL — HIGH (ref 70–99)
GLUCOSE SERPL-MCNC: 141 MG/DL — HIGH (ref 70–99)
GLUCOSE SERPL-MCNC: 143 MG/DL — HIGH (ref 70–99)
GLUCOSE SERPL-MCNC: 145 MG/DL — HIGH (ref 70–99)
GLUCOSE SERPL-MCNC: 145 MG/DL — HIGH (ref 70–99)
GLUCOSE SERPL-MCNC: 147 MG/DL — HIGH (ref 70–99)
GLUCOSE SERPL-MCNC: 149 MG/DL — HIGH (ref 70–99)
GLUCOSE SERPL-MCNC: 149 MG/DL — HIGH (ref 70–99)
GLUCOSE SERPL-MCNC: 151 MG/DL — HIGH (ref 70–99)
GLUCOSE SERPL-MCNC: 152 MG/DL — HIGH (ref 70–99)
GLUCOSE SERPL-MCNC: 158 MG/DL — HIGH (ref 70–99)
GLUCOSE SERPL-MCNC: 159 MG/DL — HIGH (ref 70–99)
GLUCOSE SERPL-MCNC: 164 MG/DL — HIGH (ref 70–99)
GLUCOSE SERPL-MCNC: 165 MG/DL — HIGH (ref 70–99)
GLUCOSE SERPL-MCNC: 166 MG/DL — HIGH (ref 70–99)
GLUCOSE SERPL-MCNC: 167 MG/DL — HIGH (ref 70–99)
GLUCOSE SERPL-MCNC: 168 MG/DL — HIGH (ref 70–99)
GLUCOSE SERPL-MCNC: 198 MG/DL — HIGH (ref 70–99)
GLUCOSE SERPL-MCNC: 71 MG/DL — SIGNIFICANT CHANGE UP (ref 70–99)
GLUCOSE SERPL-MCNC: 71 MG/DL — SIGNIFICANT CHANGE UP (ref 70–99)
GLUCOSE SERPL-MCNC: 75 MG/DL — SIGNIFICANT CHANGE UP (ref 70–99)
GLUCOSE SERPL-MCNC: 77 MG/DL — SIGNIFICANT CHANGE UP (ref 70–99)
GLUCOSE SERPL-MCNC: 77 MG/DL — SIGNIFICANT CHANGE UP (ref 70–99)
GLUCOSE SERPL-MCNC: 79 MG/DL — SIGNIFICANT CHANGE UP (ref 70–99)
GLUCOSE SERPL-MCNC: 81 MG/DL — SIGNIFICANT CHANGE UP (ref 70–99)
GLUCOSE SERPL-MCNC: 81 MG/DL — SIGNIFICANT CHANGE UP (ref 70–99)
GLUCOSE SERPL-MCNC: 83 MG/DL — SIGNIFICANT CHANGE UP (ref 70–99)
GLUCOSE SERPL-MCNC: 83 MG/DL — SIGNIFICANT CHANGE UP (ref 70–99)
GLUCOSE SERPL-MCNC: 85 MG/DL — SIGNIFICANT CHANGE UP (ref 70–99)
GLUCOSE SERPL-MCNC: 86 MG/DL — SIGNIFICANT CHANGE UP (ref 70–99)
GLUCOSE SERPL-MCNC: 87 MG/DL — SIGNIFICANT CHANGE UP (ref 70–99)
GLUCOSE SERPL-MCNC: 87 MG/DL — SIGNIFICANT CHANGE UP (ref 70–99)
GLUCOSE SERPL-MCNC: 90 MG/DL — SIGNIFICANT CHANGE UP (ref 70–99)
GLUCOSE SERPL-MCNC: 90 MG/DL — SIGNIFICANT CHANGE UP (ref 70–99)
GLUCOSE SERPL-MCNC: 93 MG/DL — SIGNIFICANT CHANGE UP (ref 70–99)
GLUCOSE SERPL-MCNC: 93 MG/DL — SIGNIFICANT CHANGE UP (ref 70–99)
GLUCOSE SERPL-MCNC: 94 MG/DL — SIGNIFICANT CHANGE UP (ref 70–99)
GLUCOSE SERPL-MCNC: 95 MG/DL — SIGNIFICANT CHANGE UP (ref 70–99)
GLUCOSE SERPL-MCNC: 98 MG/DL — SIGNIFICANT CHANGE UP (ref 70–99)
GLUCOSE SERPL-MCNC: 99 MG/DL — SIGNIFICANT CHANGE UP (ref 70–99)
GLUCOSE SERPL-MCNC: 99 MG/DL — SIGNIFICANT CHANGE UP (ref 70–99)
GLUCOSE UR QL: ABNORMAL
GLUCOSE UR QL: NEGATIVE — SIGNIFICANT CHANGE UP
GLUCOSE UR QL: NEGATIVE — SIGNIFICANT CHANGE UP
GLUCOSE UR QL: SIGNIFICANT CHANGE UP
GRAM STN FLD: SIGNIFICANT CHANGE UP
HAPTOGLOB SERPL-MCNC: 182 MG/DL — SIGNIFICANT CHANGE UP (ref 34–200)
HAPTOGLOB SERPL-MCNC: 186 MG/DL — SIGNIFICANT CHANGE UP (ref 34–200)
HAPTOGLOB SERPL-MCNC: 190 MG/DL — SIGNIFICANT CHANGE UP (ref 34–200)
HAPTOGLOB SERPL-MCNC: 191 MG/DL — SIGNIFICANT CHANGE UP (ref 34–200)
HAPTOGLOB SERPL-MCNC: 209 MG/DL — HIGH (ref 34–200)
HAPTOGLOB SERPL-MCNC: 334 MG/DL — HIGH (ref 34–200)
HCO3 BLDV-SCNC: 21 MMOL/L — LOW (ref 22–29)
HCO3 BLDV-SCNC: 23 MMOL/L — SIGNIFICANT CHANGE UP (ref 22–29)
HCO3 BLDV-SCNC: 30 MMOL/L — HIGH (ref 22–29)
HCT VFR BLD CALC: 16.1 % — CRITICAL LOW (ref 39–50)
HCT VFR BLD CALC: 17 % — CRITICAL LOW (ref 39–50)
HCT VFR BLD CALC: 18.4 % — CRITICAL LOW (ref 39–50)
HCT VFR BLD CALC: 19.6 % — CRITICAL LOW (ref 39–50)
HCT VFR BLD CALC: 21.2 % — LOW (ref 39–50)
HCT VFR BLD CALC: 22 % — LOW (ref 39–50)
HCT VFR BLD CALC: 22.4 % — LOW (ref 39–50)
HCT VFR BLD CALC: 22.5 % — LOW (ref 39–50)
HCT VFR BLD CALC: 22.6 % — LOW (ref 39–50)
HCT VFR BLD CALC: 22.6 % — LOW (ref 39–50)
HCT VFR BLD CALC: 22.9 % — LOW (ref 39–50)
HCT VFR BLD CALC: 23.1 % — LOW (ref 39–50)
HCT VFR BLD CALC: 23.4 % — LOW (ref 39–50)
HCT VFR BLD CALC: 23.4 % — LOW (ref 39–50)
HCT VFR BLD CALC: 23.5 % — LOW (ref 39–50)
HCT VFR BLD CALC: 23.8 % — LOW (ref 39–50)
HCT VFR BLD CALC: 24.2 % — LOW (ref 39–50)
HCT VFR BLD CALC: 24.6 % — LOW (ref 39–50)
HCT VFR BLD CALC: 24.8 % — LOW (ref 39–50)
HCT VFR BLD CALC: 24.9 % — LOW (ref 39–50)
HCT VFR BLD CALC: 25 % — LOW (ref 39–50)
HCT VFR BLD CALC: 25 % — LOW (ref 39–50)
HCT VFR BLD CALC: 25.2 % — LOW (ref 39–50)
HCT VFR BLD CALC: 25.2 % — LOW (ref 39–50)
HCT VFR BLD CALC: 25.3 % — LOW (ref 39–50)
HCT VFR BLD CALC: 25.4 % — LOW (ref 39–50)
HCT VFR BLD CALC: 25.7 % — LOW (ref 39–50)
HCT VFR BLD CALC: 25.7 % — LOW (ref 39–50)
HCT VFR BLD CALC: 25.8 % — LOW (ref 39–50)
HCT VFR BLD CALC: 25.8 % — LOW (ref 39–50)
HCT VFR BLD CALC: 26 % — LOW (ref 39–50)
HCT VFR BLD CALC: 26.2 % — LOW (ref 39–50)
HCT VFR BLD CALC: 26.2 % — LOW (ref 39–50)
HCT VFR BLD CALC: 26.4 % — LOW (ref 39–50)
HCT VFR BLD CALC: 26.4 % — LOW (ref 39–50)
HCT VFR BLD CALC: 26.6 % — LOW (ref 39–50)
HCT VFR BLD CALC: 26.9 % — LOW (ref 39–50)
HCT VFR BLD CALC: 26.9 % — LOW (ref 39–50)
HCT VFR BLD CALC: 27 % — LOW (ref 39–50)
HCT VFR BLD CALC: 27 % — LOW (ref 39–50)
HCT VFR BLD CALC: 27.1 % — LOW (ref 39–50)
HCT VFR BLD CALC: 27.2 % — LOW (ref 39–50)
HCT VFR BLD CALC: 27.2 % — LOW (ref 39–50)
HCT VFR BLD CALC: 27.3 % — LOW (ref 39–50)
HCT VFR BLD CALC: 27.4 % — LOW (ref 39–50)
HCT VFR BLD CALC: 27.5 % — LOW (ref 39–50)
HCT VFR BLD CALC: 27.5 % — LOW (ref 39–50)
HCT VFR BLD CALC: 27.9 % — LOW (ref 39–50)
HCT VFR BLD CALC: 28 % — LOW (ref 39–50)
HCT VFR BLD CALC: 28.1 % — LOW (ref 39–50)
HCT VFR BLD CALC: 28.2 % — LOW (ref 39–50)
HCT VFR BLD CALC: 28.3 % — LOW (ref 39–50)
HCT VFR BLD CALC: 28.3 % — LOW (ref 39–50)
HCT VFR BLD CALC: 28.8 % — LOW (ref 39–50)
HCT VFR BLD CALC: 28.8 % — LOW (ref 39–50)
HCT VFR BLD CALC: 29.3 % — LOW (ref 39–50)
HCT VFR BLD CALC: 29.4 % — LOW (ref 39–50)
HCT VFR BLD CALC: 29.4 % — LOW (ref 39–50)
HCT VFR BLD CALC: 29.7 % — LOW (ref 39–50)
HCT VFR BLD CALC: 29.7 % — LOW (ref 39–50)
HCT VFR BLD CALC: 29.8 % — LOW (ref 39–50)
HCT VFR BLD CALC: 29.9 % — LOW (ref 39–50)
HCT VFR BLD CALC: 29.9 % — LOW (ref 39–50)
HCT VFR BLD CALC: 30.1 % — LOW (ref 39–50)
HCT VFR BLD CALC: 30.2 % — LOW (ref 39–50)
HCT VFR BLD CALC: 30.3 % — LOW (ref 39–50)
HCT VFR BLD CALC: 30.6 % — LOW (ref 39–50)
HCT VFR BLD CALC: 30.7 % — LOW (ref 39–50)
HCT VFR BLD CALC: 30.8 % — LOW (ref 39–50)
HCT VFR BLD CALC: 33.5 % — LOW (ref 39–50)
HCT VFR BLD CALC: 35.6 % — LOW (ref 39–50)
HCT VFR BLDA CALC: 20 % — CRITICAL LOW (ref 39–51)
HCT VFR BLDA CALC: 23 % — LOW (ref 39–51)
HCT VFR BLDA CALC: 29 % — LOW (ref 39–51)
HGB BLD CALC-MCNC: 6.6 G/DL — CRITICAL LOW (ref 12.6–17.4)
HGB BLD CALC-MCNC: 7.5 G/DL — LOW (ref 12.6–17.4)
HGB BLD CALC-MCNC: 9.5 G/DL — LOW (ref 12.6–17.4)
HGB BLD-MCNC: 10 G/DL — LOW (ref 13–17)
HGB BLD-MCNC: 10 G/DL — LOW (ref 13–17)
HGB BLD-MCNC: 10.9 G/DL — LOW (ref 13–17)
HGB BLD-MCNC: 11.5 G/DL — LOW (ref 13–17)
HGB BLD-MCNC: 4.8 G/DL — CRITICAL LOW (ref 13–17)
HGB BLD-MCNC: 5.5 G/DL — CRITICAL LOW (ref 13–17)
HGB BLD-MCNC: 5.9 G/DL — CRITICAL LOW (ref 13–17)
HGB BLD-MCNC: 6 G/DL — CRITICAL LOW (ref 13–17)
HGB BLD-MCNC: 6.6 G/DL — CRITICAL LOW (ref 13–17)
HGB BLD-MCNC: 6.9 G/DL — CRITICAL LOW (ref 13–17)
HGB BLD-MCNC: 7 G/DL — CRITICAL LOW (ref 13–17)
HGB BLD-MCNC: 7.1 G/DL — LOW (ref 13–17)
HGB BLD-MCNC: 7.1 G/DL — LOW (ref 13–17)
HGB BLD-MCNC: 7.2 G/DL — LOW (ref 13–17)
HGB BLD-MCNC: 7.4 G/DL — LOW (ref 13–17)
HGB BLD-MCNC: 7.5 G/DL — LOW (ref 13–17)
HGB BLD-MCNC: 7.6 G/DL — LOW (ref 13–17)
HGB BLD-MCNC: 7.7 G/DL — LOW (ref 13–17)
HGB BLD-MCNC: 7.8 G/DL — LOW (ref 13–17)
HGB BLD-MCNC: 7.8 G/DL — LOW (ref 13–17)
HGB BLD-MCNC: 7.9 G/DL — LOW (ref 13–17)
HGB BLD-MCNC: 8 G/DL — LOW (ref 13–17)
HGB BLD-MCNC: 8.1 G/DL — LOW (ref 13–17)
HGB BLD-MCNC: 8.1 G/DL — LOW (ref 13–17)
HGB BLD-MCNC: 8.2 G/DL — LOW (ref 13–17)
HGB BLD-MCNC: 8.3 G/DL — LOW (ref 13–17)
HGB BLD-MCNC: 8.4 G/DL — LOW (ref 13–17)
HGB BLD-MCNC: 8.5 G/DL — LOW (ref 13–17)
HGB BLD-MCNC: 8.6 G/DL — LOW (ref 13–17)
HGB BLD-MCNC: 8.7 G/DL — LOW (ref 13–17)
HGB BLD-MCNC: 8.8 G/DL — LOW (ref 13–17)
HGB BLD-MCNC: 8.9 G/DL — LOW (ref 13–17)
HGB BLD-MCNC: 9 G/DL — LOW (ref 13–17)
HGB BLD-MCNC: 9.1 G/DL — LOW (ref 13–17)
HGB BLD-MCNC: 9.1 G/DL — LOW (ref 13–17)
HGB BLD-MCNC: 9.2 G/DL — LOW (ref 13–17)
HGB BLD-MCNC: 9.2 G/DL — LOW (ref 13–17)
HGB BLD-MCNC: 9.3 G/DL — LOW (ref 13–17)
HGB BLD-MCNC: 9.3 G/DL — LOW (ref 13–17)
HGB BLD-MCNC: 9.4 G/DL — LOW (ref 13–17)
HGB BLD-MCNC: 9.4 G/DL — LOW (ref 13–17)
HGB BLD-MCNC: 9.5 G/DL — LOW (ref 13–17)
HGB BLD-MCNC: 9.5 G/DL — LOW (ref 13–17)
HGB BLD-MCNC: 9.6 G/DL — LOW (ref 13–17)
HGB BLD-MCNC: 9.7 G/DL — LOW (ref 13–17)
HGB BLD-MCNC: 9.7 G/DL — LOW (ref 13–17)
HGB BLD-MCNC: 9.8 G/DL — LOW (ref 13–17)
HGB BLD-MCNC: 9.9 G/DL — LOW (ref 13–17)
HOROWITZ INDEX BLDV+IHG-RTO: 21 — SIGNIFICANT CHANGE UP
HOROWITZ INDEX BLDV+IHG-RTO: 32 — SIGNIFICANT CHANGE UP
HOROWITZ INDEX BLDV+IHG-RTO: 40 — SIGNIFICANT CHANGE UP
HYALINE CASTS # UR AUTO: 10 /LPF — HIGH (ref 0–2)
HYALINE CASTS # UR AUTO: 6 /LPF — HIGH (ref 0–2)
HYPOCHROMIA BLD QL: SLIGHT — SIGNIFICANT CHANGE UP
IMM GRANULOCYTES NFR BLD AUTO: 0.3 % — SIGNIFICANT CHANGE UP (ref 0–1.5)
IMM GRANULOCYTES NFR BLD AUTO: 0.4 % — SIGNIFICANT CHANGE UP (ref 0–1.5)
IMM GRANULOCYTES NFR BLD AUTO: 0.6 % — SIGNIFICANT CHANGE UP (ref 0–1.5)
IMM GRANULOCYTES NFR BLD AUTO: 0.6 % — SIGNIFICANT CHANGE UP (ref 0–1.5)
IMM GRANULOCYTES NFR BLD AUTO: 0.7 % — SIGNIFICANT CHANGE UP (ref 0–1.5)
IMM GRANULOCYTES NFR BLD AUTO: 0.7 % — SIGNIFICANT CHANGE UP (ref 0–1.5)
IMM GRANULOCYTES NFR BLD AUTO: 0.8 % — SIGNIFICANT CHANGE UP (ref 0–1.5)
IMM GRANULOCYTES NFR BLD AUTO: 0.8 % — SIGNIFICANT CHANGE UP (ref 0–1.5)
IMM GRANULOCYTES NFR BLD AUTO: 0.9 % — SIGNIFICANT CHANGE UP (ref 0–1.5)
IMM GRANULOCYTES NFR BLD AUTO: 0.9 % — SIGNIFICANT CHANGE UP (ref 0–1.5)
IMM GRANULOCYTES NFR BLD AUTO: 1 % — SIGNIFICANT CHANGE UP (ref 0–1.5)
IMM GRANULOCYTES NFR BLD AUTO: 1.1 % — SIGNIFICANT CHANGE UP (ref 0–1.5)
IMM GRANULOCYTES NFR BLD AUTO: 1.4 % — SIGNIFICANT CHANGE UP (ref 0–1.5)
IMM GRANULOCYTES NFR BLD AUTO: 1.7 % — HIGH (ref 0–1.5)
INR BLD: 1.14 RATIO — SIGNIFICANT CHANGE UP (ref 0.88–1.16)
INR BLD: 1.15 RATIO — SIGNIFICANT CHANGE UP (ref 0.88–1.16)
INR BLD: 1.32 RATIO — HIGH (ref 0.88–1.16)
INR BLD: 1.33 RATIO — HIGH (ref 0.88–1.16)
INR BLD: 1.34 RATIO — HIGH (ref 0.88–1.16)
INR BLD: 1.64 RATIO — HIGH (ref 0.88–1.16)
IRON SATN MFR SERPL: 15 % — LOW (ref 16–55)
IRON SATN MFR SERPL: 23 UG/DL — LOW (ref 45–165)
KETONES UR-MCNC: ABNORMAL
KETONES UR-MCNC: NEGATIVE — SIGNIFICANT CHANGE UP
KETONES UR-MCNC: NEGATIVE — SIGNIFICANT CHANGE UP
KETONES UR-MCNC: SIGNIFICANT CHANGE UP
LACTATE BLDV-MCNC: 0.7 MMOL/L — SIGNIFICANT CHANGE UP (ref 0.7–2)
LACTATE BLDV-MCNC: 1.1 MMOL/L — SIGNIFICANT CHANGE UP (ref 0.7–2)
LACTATE BLDV-MCNC: 1.2 MMOL/L — SIGNIFICANT CHANGE UP (ref 0.7–2)
LACTATE SERPL-SCNC: 1 MMOL/L — SIGNIFICANT CHANGE UP (ref 0.7–2)
LACTATE SERPL-SCNC: 1.1 MMOL/L — SIGNIFICANT CHANGE UP (ref 0.7–2)
LDH SERPL L TO P-CCNC: 165 U/L — SIGNIFICANT CHANGE UP (ref 50–242)
LDH SERPL L TO P-CCNC: 178 U/L — SIGNIFICANT CHANGE UP (ref 50–242)
LDH SERPL L TO P-CCNC: 179 U/L — SIGNIFICANT CHANGE UP (ref 50–242)
LDH SERPL L TO P-CCNC: 181 U/L — SIGNIFICANT CHANGE UP (ref 50–242)
LDH SERPL L TO P-CCNC: 188 U/L — SIGNIFICANT CHANGE UP (ref 50–242)
LDH SERPL L TO P-CCNC: 195 U/L — SIGNIFICANT CHANGE UP (ref 50–242)
LDH SERPL L TO P-CCNC: 199 U/L — SIGNIFICANT CHANGE UP (ref 50–242)
LDH SERPL L TO P-CCNC: 199 U/L — SIGNIFICANT CHANGE UP (ref 50–242)
LDH SERPL L TO P-CCNC: 201 U/L — SIGNIFICANT CHANGE UP (ref 50–242)
LDH SERPL L TO P-CCNC: 203 U/L — SIGNIFICANT CHANGE UP (ref 50–242)
LDH SERPL L TO P-CCNC: 211 U/L — SIGNIFICANT CHANGE UP (ref 50–242)
LDH SERPL L TO P-CCNC: 213 U/L — SIGNIFICANT CHANGE UP (ref 50–242)
LDH SERPL L TO P-CCNC: 213 U/L — SIGNIFICANT CHANGE UP (ref 50–242)
LDH SERPL L TO P-CCNC: 215 U/L — SIGNIFICANT CHANGE UP (ref 50–242)
LDH SERPL L TO P-CCNC: 218 U/L — SIGNIFICANT CHANGE UP (ref 50–242)
LDH SERPL L TO P-CCNC: 223 U/L — SIGNIFICANT CHANGE UP (ref 50–242)
LDH SERPL L TO P-CCNC: 227 U/L — SIGNIFICANT CHANGE UP (ref 50–242)
LDH SERPL L TO P-CCNC: 230 U/L — SIGNIFICANT CHANGE UP (ref 50–242)
LDH SERPL L TO P-CCNC: 237 U/L — SIGNIFICANT CHANGE UP (ref 50–242)
LDH SERPL L TO P-CCNC: 249 U/L — HIGH (ref 50–242)
LDH SERPL L TO P-CCNC: 252 U/L — HIGH (ref 50–242)
LDH SERPL L TO P-CCNC: 254 U/L — HIGH (ref 50–242)
LDH SERPL L TO P-CCNC: 254 U/L — HIGH (ref 50–242)
LDH SERPL L TO P-CCNC: 267 U/L — HIGH (ref 50–242)
LDH SERPL L TO P-CCNC: 270 U/L — HIGH (ref 50–242)
LDH SERPL L TO P-CCNC: 274 U/L — HIGH (ref 50–242)
LDH SERPL L TO P-CCNC: 381 U/L — HIGH (ref 50–242)
LEGIONELLA AG UR QL: NEGATIVE — SIGNIFICANT CHANGE UP
LEUKOCYTE ESTERASE UR-ACNC: ABNORMAL
LEUKOCYTE ESTERASE UR-ACNC: ABNORMAL
LEUKOCYTE ESTERASE UR-ACNC: NEGATIVE — SIGNIFICANT CHANGE UP
LEUKOCYTE ESTERASE UR-ACNC: SIGNIFICANT CHANGE UP
LYMPHOCYTES # BLD AUTO: 0.13 K/UL — LOW (ref 1–3.3)
LYMPHOCYTES # BLD AUTO: 0.38 K/UL — LOW (ref 1–3.3)
LYMPHOCYTES # BLD AUTO: 0.46 K/UL — LOW (ref 1–3.3)
LYMPHOCYTES # BLD AUTO: 0.49 K/UL — LOW (ref 1–3.3)
LYMPHOCYTES # BLD AUTO: 0.49 K/UL — LOW (ref 1–3.3)
LYMPHOCYTES # BLD AUTO: 0.56 K/UL — LOW (ref 1–3.3)
LYMPHOCYTES # BLD AUTO: 0.57 K/UL — LOW (ref 1–3.3)
LYMPHOCYTES # BLD AUTO: 0.61 K/UL — LOW (ref 1–3.3)
LYMPHOCYTES # BLD AUTO: 0.66 K/UL — LOW (ref 1–3.3)
LYMPHOCYTES # BLD AUTO: 0.67 K/UL — LOW (ref 1–3.3)
LYMPHOCYTES # BLD AUTO: 0.78 K/UL — LOW (ref 1–3.3)
LYMPHOCYTES # BLD AUTO: 0.8 % — LOW (ref 13–44)
LYMPHOCYTES # BLD AUTO: 0.87 K/UL — LOW (ref 1–3.3)
LYMPHOCYTES # BLD AUTO: 0.88 K/UL — LOW (ref 1–3.3)
LYMPHOCYTES # BLD AUTO: 0.88 K/UL — LOW (ref 1–3.3)
LYMPHOCYTES # BLD AUTO: 0.89 K/UL — LOW (ref 1–3.3)
LYMPHOCYTES # BLD AUTO: 0.9 K/UL — LOW (ref 1–3.3)
LYMPHOCYTES # BLD AUTO: 0.9 K/UL — LOW (ref 1–3.3)
LYMPHOCYTES # BLD AUTO: 1.01 K/UL — SIGNIFICANT CHANGE UP (ref 1–3.3)
LYMPHOCYTES # BLD AUTO: 1.6 % — LOW (ref 13–44)
LYMPHOCYTES # BLD AUTO: 1.8 % — LOW (ref 13–44)
LYMPHOCYTES # BLD AUTO: 2.1 % — LOW (ref 13–44)
LYMPHOCYTES # BLD AUTO: 2.3 % — LOW (ref 13–44)
LYMPHOCYTES # BLD AUTO: 2.3 % — LOW (ref 13–44)
LYMPHOCYTES # BLD AUTO: 2.6 % — LOW (ref 13–44)
LYMPHOCYTES # BLD AUTO: 2.9 % — LOW (ref 13–44)
LYMPHOCYTES # BLD AUTO: 3 % — LOW (ref 13–44)
LYMPHOCYTES # BLD AUTO: 3.7 % — LOW (ref 13–44)
LYMPHOCYTES # BLD AUTO: 5.8 % — LOW (ref 13–44)
LYMPHOCYTES # BLD AUTO: 6 % — LOW (ref 13–44)
LYMPHOCYTES # BLD AUTO: 6 % — LOW (ref 13–44)
LYMPHOCYTES # BLD AUTO: 6.4 % — LOW (ref 13–44)
LYMPHOCYTES # BLD AUTO: 6.6 % — LOW (ref 13–44)
LYMPHOCYTES # BLD AUTO: 6.9 % — LOW (ref 13–44)
LYMPHOCYTES # BLD AUTO: 7.8 % — LOW (ref 13–44)
LYMPHOCYTES # BLD AUTO: 7.9 % — LOW (ref 13–44)
MAGNESIUM SERPL-MCNC: 1.5 MG/DL — LOW (ref 1.6–2.6)
MAGNESIUM SERPL-MCNC: 1.6 MG/DL — SIGNIFICANT CHANGE UP (ref 1.6–2.6)
MAGNESIUM SERPL-MCNC: 1.7 MG/DL — SIGNIFICANT CHANGE UP (ref 1.6–2.6)
MAGNESIUM SERPL-MCNC: 1.8 MG/DL — SIGNIFICANT CHANGE UP (ref 1.6–2.6)
MAGNESIUM SERPL-MCNC: 1.9 MG/DL — SIGNIFICANT CHANGE UP (ref 1.6–2.6)
MAGNESIUM SERPL-MCNC: 2 MG/DL — SIGNIFICANT CHANGE UP (ref 1.6–2.6)
MAGNESIUM SERPL-MCNC: 2.1 MG/DL — SIGNIFICANT CHANGE UP (ref 1.6–2.6)
MAGNESIUM SERPL-MCNC: 2.2 MG/DL — SIGNIFICANT CHANGE UP (ref 1.6–2.6)
MAGNESIUM SERPL-MCNC: 2.2 MG/DL — SIGNIFICANT CHANGE UP (ref 1.6–2.6)
MANUAL SMEAR VERIFICATION: SIGNIFICANT CHANGE UP
MANUAL SMEAR VERIFICATION: SIGNIFICANT CHANGE UP
MCHC RBC-ENTMCNC: 28.8 PG — SIGNIFICANT CHANGE UP (ref 27–34)
MCHC RBC-ENTMCNC: 28.9 PG — SIGNIFICANT CHANGE UP (ref 27–34)
MCHC RBC-ENTMCNC: 29 PG — SIGNIFICANT CHANGE UP (ref 27–34)
MCHC RBC-ENTMCNC: 29.1 PG — SIGNIFICANT CHANGE UP (ref 27–34)
MCHC RBC-ENTMCNC: 29.2 PG — SIGNIFICANT CHANGE UP (ref 27–34)
MCHC RBC-ENTMCNC: 29.3 PG — SIGNIFICANT CHANGE UP (ref 27–34)
MCHC RBC-ENTMCNC: 29.4 PG — SIGNIFICANT CHANGE UP (ref 27–34)
MCHC RBC-ENTMCNC: 29.5 PG — SIGNIFICANT CHANGE UP (ref 27–34)
MCHC RBC-ENTMCNC: 29.6 PG — SIGNIFICANT CHANGE UP (ref 27–34)
MCHC RBC-ENTMCNC: 29.7 PG — SIGNIFICANT CHANGE UP (ref 27–34)
MCHC RBC-ENTMCNC: 29.8 GM/DL — LOW (ref 32–36)
MCHC RBC-ENTMCNC: 29.8 PG — SIGNIFICANT CHANGE UP (ref 27–34)
MCHC RBC-ENTMCNC: 29.8 PG — SIGNIFICANT CHANGE UP (ref 27–34)
MCHC RBC-ENTMCNC: 29.9 PG — SIGNIFICANT CHANGE UP (ref 27–34)
MCHC RBC-ENTMCNC: 29.9 PG — SIGNIFICANT CHANGE UP (ref 27–34)
MCHC RBC-ENTMCNC: 30 GM/DL — LOW (ref 32–36)
MCHC RBC-ENTMCNC: 30 PG — SIGNIFICANT CHANGE UP (ref 27–34)
MCHC RBC-ENTMCNC: 30.1 PG — SIGNIFICANT CHANGE UP (ref 27–34)
MCHC RBC-ENTMCNC: 30.2 PG — SIGNIFICANT CHANGE UP (ref 27–34)
MCHC RBC-ENTMCNC: 30.3 PG — SIGNIFICANT CHANGE UP (ref 27–34)
MCHC RBC-ENTMCNC: 30.4 PG — SIGNIFICANT CHANGE UP (ref 27–34)
MCHC RBC-ENTMCNC: 30.5 GM/DL — LOW (ref 32–36)
MCHC RBC-ENTMCNC: 30.5 GM/DL — LOW (ref 32–36)
MCHC RBC-ENTMCNC: 30.5 PG — SIGNIFICANT CHANGE UP (ref 27–34)
MCHC RBC-ENTMCNC: 30.6 GM/DL — LOW (ref 32–36)
MCHC RBC-ENTMCNC: 30.6 PG — SIGNIFICANT CHANGE UP (ref 27–34)
MCHC RBC-ENTMCNC: 30.7 GM/DL — LOW (ref 32–36)
MCHC RBC-ENTMCNC: 30.7 GM/DL — LOW (ref 32–36)
MCHC RBC-ENTMCNC: 30.7 PG — SIGNIFICANT CHANGE UP (ref 27–34)
MCHC RBC-ENTMCNC: 30.8 GM/DL — LOW (ref 32–36)
MCHC RBC-ENTMCNC: 31 GM/DL — LOW (ref 32–36)
MCHC RBC-ENTMCNC: 31 GM/DL — LOW (ref 32–36)
MCHC RBC-ENTMCNC: 31.1 GM/DL — LOW (ref 32–36)
MCHC RBC-ENTMCNC: 31.2 GM/DL — LOW (ref 32–36)
MCHC RBC-ENTMCNC: 31.3 GM/DL — LOW (ref 32–36)
MCHC RBC-ENTMCNC: 31.3 GM/DL — LOW (ref 32–36)
MCHC RBC-ENTMCNC: 31.4 GM/DL — LOW (ref 32–36)
MCHC RBC-ENTMCNC: 31.5 GM/DL — LOW (ref 32–36)
MCHC RBC-ENTMCNC: 31.5 GM/DL — LOW (ref 32–36)
MCHC RBC-ENTMCNC: 31.6 GM/DL — LOW (ref 32–36)
MCHC RBC-ENTMCNC: 31.7 GM/DL — LOW (ref 32–36)
MCHC RBC-ENTMCNC: 31.8 GM/DL — LOW (ref 32–36)
MCHC RBC-ENTMCNC: 31.9 GM/DL — LOW (ref 32–36)
MCHC RBC-ENTMCNC: 31.9 GM/DL — LOW (ref 32–36)
MCHC RBC-ENTMCNC: 32 GM/DL — SIGNIFICANT CHANGE UP (ref 32–36)
MCHC RBC-ENTMCNC: 32.1 GM/DL — SIGNIFICANT CHANGE UP (ref 32–36)
MCHC RBC-ENTMCNC: 32.2 GM/DL — SIGNIFICANT CHANGE UP (ref 32–36)
MCHC RBC-ENTMCNC: 32.3 GM/DL — SIGNIFICANT CHANGE UP (ref 32–36)
MCHC RBC-ENTMCNC: 32.4 GM/DL — SIGNIFICANT CHANGE UP (ref 32–36)
MCHC RBC-ENTMCNC: 32.5 GM/DL — SIGNIFICANT CHANGE UP (ref 32–36)
MCHC RBC-ENTMCNC: 32.5 GM/DL — SIGNIFICANT CHANGE UP (ref 32–36)
MCHC RBC-ENTMCNC: 32.6 GM/DL — SIGNIFICANT CHANGE UP (ref 32–36)
MCHC RBC-ENTMCNC: 32.6 GM/DL — SIGNIFICANT CHANGE UP (ref 32–36)
MCHC RBC-ENTMCNC: 32.7 GM/DL — SIGNIFICANT CHANGE UP (ref 32–36)
MCHC RBC-ENTMCNC: 32.9 GM/DL — SIGNIFICANT CHANGE UP (ref 32–36)
MCHC RBC-ENTMCNC: 33 GM/DL — SIGNIFICANT CHANGE UP (ref 32–36)
MCHC RBC-ENTMCNC: 33.1 GM/DL — SIGNIFICANT CHANGE UP (ref 32–36)
MCHC RBC-ENTMCNC: 33.2 GM/DL — SIGNIFICANT CHANGE UP (ref 32–36)
MCHC RBC-ENTMCNC: 33.2 GM/DL — SIGNIFICANT CHANGE UP (ref 32–36)
MCHC RBC-ENTMCNC: 33.4 GM/DL — SIGNIFICANT CHANGE UP (ref 32–36)
MCV RBC AUTO: 100 FL — SIGNIFICANT CHANGE UP (ref 80–100)
MCV RBC AUTO: 100.6 FL — HIGH (ref 80–100)
MCV RBC AUTO: 88.5 FL — SIGNIFICANT CHANGE UP (ref 80–100)
MCV RBC AUTO: 88.8 FL — SIGNIFICANT CHANGE UP (ref 80–100)
MCV RBC AUTO: 89.1 FL — SIGNIFICANT CHANGE UP (ref 80–100)
MCV RBC AUTO: 89.9 FL — SIGNIFICANT CHANGE UP (ref 80–100)
MCV RBC AUTO: 89.9 FL — SIGNIFICANT CHANGE UP (ref 80–100)
MCV RBC AUTO: 90 FL — SIGNIFICANT CHANGE UP (ref 80–100)
MCV RBC AUTO: 90.2 FL — SIGNIFICANT CHANGE UP (ref 80–100)
MCV RBC AUTO: 90.3 FL — SIGNIFICANT CHANGE UP (ref 80–100)
MCV RBC AUTO: 90.5 FL — SIGNIFICANT CHANGE UP (ref 80–100)
MCV RBC AUTO: 90.7 FL — SIGNIFICANT CHANGE UP (ref 80–100)
MCV RBC AUTO: 90.8 FL — SIGNIFICANT CHANGE UP (ref 80–100)
MCV RBC AUTO: 90.9 FL — SIGNIFICANT CHANGE UP (ref 80–100)
MCV RBC AUTO: 90.9 FL — SIGNIFICANT CHANGE UP (ref 80–100)
MCV RBC AUTO: 91 FL — SIGNIFICANT CHANGE UP (ref 80–100)
MCV RBC AUTO: 91.1 FL — SIGNIFICANT CHANGE UP (ref 80–100)
MCV RBC AUTO: 91.2 FL — SIGNIFICANT CHANGE UP (ref 80–100)
MCV RBC AUTO: 91.3 FL — SIGNIFICANT CHANGE UP (ref 80–100)
MCV RBC AUTO: 91.5 FL — SIGNIFICANT CHANGE UP (ref 80–100)
MCV RBC AUTO: 91.8 FL — SIGNIFICANT CHANGE UP (ref 80–100)
MCV RBC AUTO: 92 FL — SIGNIFICANT CHANGE UP (ref 80–100)
MCV RBC AUTO: 92 FL — SIGNIFICANT CHANGE UP (ref 80–100)
MCV RBC AUTO: 92.1 FL — SIGNIFICANT CHANGE UP (ref 80–100)
MCV RBC AUTO: 92.4 FL — SIGNIFICANT CHANGE UP (ref 80–100)
MCV RBC AUTO: 92.7 FL — SIGNIFICANT CHANGE UP (ref 80–100)
MCV RBC AUTO: 92.8 FL — SIGNIFICANT CHANGE UP (ref 80–100)
MCV RBC AUTO: 92.8 FL — SIGNIFICANT CHANGE UP (ref 80–100)
MCV RBC AUTO: 92.9 FL — SIGNIFICANT CHANGE UP (ref 80–100)
MCV RBC AUTO: 92.9 FL — SIGNIFICANT CHANGE UP (ref 80–100)
MCV RBC AUTO: 93 FL — SIGNIFICANT CHANGE UP (ref 80–100)
MCV RBC AUTO: 93.1 FL — SIGNIFICANT CHANGE UP (ref 80–100)
MCV RBC AUTO: 93.2 FL — SIGNIFICANT CHANGE UP (ref 80–100)
MCV RBC AUTO: 93.2 FL — SIGNIFICANT CHANGE UP (ref 80–100)
MCV RBC AUTO: 93.3 FL — SIGNIFICANT CHANGE UP (ref 80–100)
MCV RBC AUTO: 93.4 FL — SIGNIFICANT CHANGE UP (ref 80–100)
MCV RBC AUTO: 93.4 FL — SIGNIFICANT CHANGE UP (ref 80–100)
MCV RBC AUTO: 94 FL — SIGNIFICANT CHANGE UP (ref 80–100)
MCV RBC AUTO: 94.2 FL — SIGNIFICANT CHANGE UP (ref 80–100)
MCV RBC AUTO: 94.2 FL — SIGNIFICANT CHANGE UP (ref 80–100)
MCV RBC AUTO: 94.3 FL — SIGNIFICANT CHANGE UP (ref 80–100)
MCV RBC AUTO: 94.4 FL — SIGNIFICANT CHANGE UP (ref 80–100)
MCV RBC AUTO: 94.5 FL — SIGNIFICANT CHANGE UP (ref 80–100)
MCV RBC AUTO: 94.7 FL — SIGNIFICANT CHANGE UP (ref 80–100)
MCV RBC AUTO: 95 FL — SIGNIFICANT CHANGE UP (ref 80–100)
MCV RBC AUTO: 95.1 FL — SIGNIFICANT CHANGE UP (ref 80–100)
MCV RBC AUTO: 95.3 FL — SIGNIFICANT CHANGE UP (ref 80–100)
MCV RBC AUTO: 95.3 FL — SIGNIFICANT CHANGE UP (ref 80–100)
MCV RBC AUTO: 95.4 FL — SIGNIFICANT CHANGE UP (ref 80–100)
MCV RBC AUTO: 95.4 FL — SIGNIFICANT CHANGE UP (ref 80–100)
MCV RBC AUTO: 95.5 FL — SIGNIFICANT CHANGE UP (ref 80–100)
MCV RBC AUTO: 95.5 FL — SIGNIFICANT CHANGE UP (ref 80–100)
MCV RBC AUTO: 95.8 FL — SIGNIFICANT CHANGE UP (ref 80–100)
MCV RBC AUTO: 96.1 FL — SIGNIFICANT CHANGE UP (ref 80–100)
MCV RBC AUTO: 96.2 FL — SIGNIFICANT CHANGE UP (ref 80–100)
MCV RBC AUTO: 97 FL — SIGNIFICANT CHANGE UP (ref 80–100)
MCV RBC AUTO: 97.3 FL — SIGNIFICANT CHANGE UP (ref 80–100)
MCV RBC AUTO: 97.4 FL — SIGNIFICANT CHANGE UP (ref 80–100)
MCV RBC AUTO: 97.9 FL — SIGNIFICANT CHANGE UP (ref 80–100)
MCV RBC AUTO: 98.2 FL — SIGNIFICANT CHANGE UP (ref 80–100)
MCV RBC AUTO: 98.3 FL — SIGNIFICANT CHANGE UP (ref 80–100)
MCV RBC AUTO: 98.4 FL — SIGNIFICANT CHANGE UP (ref 80–100)
MCV RBC AUTO: 98.5 FL — SIGNIFICANT CHANGE UP (ref 80–100)
MCV RBC AUTO: 99.1 FL — SIGNIFICANT CHANGE UP (ref 80–100)
METHOD TYPE: SIGNIFICANT CHANGE UP
MONOCYTES # BLD AUTO: 0.44 K/UL — SIGNIFICANT CHANGE UP (ref 0–0.9)
MONOCYTES # BLD AUTO: 0.53 K/UL — SIGNIFICANT CHANGE UP (ref 0–0.9)
MONOCYTES # BLD AUTO: 0.55 K/UL — SIGNIFICANT CHANGE UP (ref 0–0.9)
MONOCYTES # BLD AUTO: 0.57 K/UL — SIGNIFICANT CHANGE UP (ref 0–0.9)
MONOCYTES # BLD AUTO: 0.61 K/UL — SIGNIFICANT CHANGE UP (ref 0–0.9)
MONOCYTES # BLD AUTO: 0.61 K/UL — SIGNIFICANT CHANGE UP (ref 0–0.9)
MONOCYTES # BLD AUTO: 0.65 K/UL — SIGNIFICANT CHANGE UP (ref 0–0.9)
MONOCYTES # BLD AUTO: 0.69 K/UL — SIGNIFICANT CHANGE UP (ref 0–0.9)
MONOCYTES # BLD AUTO: 0.7 K/UL — SIGNIFICANT CHANGE UP (ref 0–0.9)
MONOCYTES # BLD AUTO: 0.71 K/UL — SIGNIFICANT CHANGE UP (ref 0–0.9)
MONOCYTES # BLD AUTO: 0.73 K/UL — SIGNIFICANT CHANGE UP (ref 0–0.9)
MONOCYTES # BLD AUTO: 0.74 K/UL — SIGNIFICANT CHANGE UP (ref 0–0.9)
MONOCYTES # BLD AUTO: 0.81 K/UL — SIGNIFICANT CHANGE UP (ref 0–0.9)
MONOCYTES # BLD AUTO: 0.82 K/UL — SIGNIFICANT CHANGE UP (ref 0–0.9)
MONOCYTES # BLD AUTO: 1.11 K/UL — HIGH (ref 0–0.9)
MONOCYTES # BLD AUTO: 1.12 K/UL — HIGH (ref 0–0.9)
MONOCYTES NFR BLD AUTO: 2.2 % — SIGNIFICANT CHANGE UP (ref 2–14)
MONOCYTES NFR BLD AUTO: 2.3 % — SIGNIFICANT CHANGE UP (ref 2–14)
MONOCYTES NFR BLD AUTO: 2.6 % — SIGNIFICANT CHANGE UP (ref 2–14)
MONOCYTES NFR BLD AUTO: 2.7 % — SIGNIFICANT CHANGE UP (ref 2–14)
MONOCYTES NFR BLD AUTO: 2.9 % — SIGNIFICANT CHANGE UP (ref 2–14)
MONOCYTES NFR BLD AUTO: 3.4 % — SIGNIFICANT CHANGE UP (ref 2–14)
MONOCYTES NFR BLD AUTO: 3.6 % — SIGNIFICANT CHANGE UP (ref 2–14)
MONOCYTES NFR BLD AUTO: 3.8 % — SIGNIFICANT CHANGE UP (ref 2–14)
MONOCYTES NFR BLD AUTO: 4.2 % — SIGNIFICANT CHANGE UP (ref 2–14)
MONOCYTES NFR BLD AUTO: 4.2 % — SIGNIFICANT CHANGE UP (ref 2–14)
MONOCYTES NFR BLD AUTO: 4.5 % — SIGNIFICANT CHANGE UP (ref 2–14)
MONOCYTES NFR BLD AUTO: 4.6 % — SIGNIFICANT CHANGE UP (ref 2–14)
MONOCYTES NFR BLD AUTO: 4.8 % — SIGNIFICANT CHANGE UP (ref 2–14)
MONOCYTES NFR BLD AUTO: 4.8 % — SIGNIFICANT CHANGE UP (ref 2–14)
MONOCYTES NFR BLD AUTO: 4.9 % — SIGNIFICANT CHANGE UP (ref 2–14)
MONOCYTES NFR BLD AUTO: 5.2 % — SIGNIFICANT CHANGE UP (ref 2–14)
MONOCYTES NFR BLD AUTO: 6.1 % — SIGNIFICANT CHANGE UP (ref 2–14)
MONOCYTES NFR BLD AUTO: 6.4 % — SIGNIFICANT CHANGE UP (ref 2–14)
MRSA PCR RESULT.: SIGNIFICANT CHANGE UP
NEUTROPHILS # BLD AUTO: 10.82 K/UL — HIGH (ref 1.8–7.4)
NEUTROPHILS # BLD AUTO: 11.46 K/UL — HIGH (ref 1.8–7.4)
NEUTROPHILS # BLD AUTO: 11.72 K/UL — HIGH (ref 1.8–7.4)
NEUTROPHILS # BLD AUTO: 12.49 K/UL — HIGH (ref 1.8–7.4)
NEUTROPHILS # BLD AUTO: 12.55 K/UL — HIGH (ref 1.8–7.4)
NEUTROPHILS # BLD AUTO: 13.42 K/UL — HIGH (ref 1.8–7.4)
NEUTROPHILS # BLD AUTO: 14.97 K/UL — HIGH (ref 1.8–7.4)
NEUTROPHILS # BLD AUTO: 16.08 K/UL — HIGH (ref 1.8–7.4)
NEUTROPHILS # BLD AUTO: 19.59 K/UL — HIGH (ref 1.8–7.4)
NEUTROPHILS # BLD AUTO: 19.74 K/UL — HIGH (ref 1.8–7.4)
NEUTROPHILS # BLD AUTO: 19.83 K/UL — HIGH (ref 1.8–7.4)
NEUTROPHILS # BLD AUTO: 20.55 K/UL — HIGH (ref 1.8–7.4)
NEUTROPHILS # BLD AUTO: 20.68 K/UL — HIGH (ref 1.8–7.4)
NEUTROPHILS # BLD AUTO: 22.27 K/UL — HIGH (ref 1.8–7.4)
NEUTROPHILS # BLD AUTO: 22.68 K/UL — HIGH (ref 1.8–7.4)
NEUTROPHILS # BLD AUTO: 24.76 K/UL — HIGH (ref 1.8–7.4)
NEUTROPHILS # BLD AUTO: 9.23 K/UL — HIGH (ref 1.8–7.4)
NEUTROPHILS # BLD AUTO: 9.54 K/UL — HIGH (ref 1.8–7.4)
NEUTROPHILS NFR BLD AUTO: 82.2 % — HIGH (ref 43–77)
NEUTROPHILS NFR BLD AUTO: 82.4 % — HIGH (ref 43–77)
NEUTROPHILS NFR BLD AUTO: 84.7 % — HIGH (ref 43–77)
NEUTROPHILS NFR BLD AUTO: 84.9 % — HIGH (ref 43–77)
NEUTROPHILS NFR BLD AUTO: 85.1 % — HIGH (ref 43–77)
NEUTROPHILS NFR BLD AUTO: 86.1 % — HIGH (ref 43–77)
NEUTROPHILS NFR BLD AUTO: 86.2 % — HIGH (ref 43–77)
NEUTROPHILS NFR BLD AUTO: 87.1 % — HIGH (ref 43–77)
NEUTROPHILS NFR BLD AUTO: 90.1 % — HIGH (ref 43–77)
NEUTROPHILS NFR BLD AUTO: 91.2 % — HIGH (ref 43–77)
NEUTROPHILS NFR BLD AUTO: 91.4 % — HIGH (ref 43–77)
NEUTROPHILS NFR BLD AUTO: 91.8 % — HIGH (ref 43–77)
NEUTROPHILS NFR BLD AUTO: 92.1 % — HIGH (ref 43–77)
NEUTROPHILS NFR BLD AUTO: 92.3 % — HIGH (ref 43–77)
NEUTROPHILS NFR BLD AUTO: 92.9 % — HIGH (ref 43–77)
NEUTROPHILS NFR BLD AUTO: 93.1 % — HIGH (ref 43–77)
NEUTROPHILS NFR BLD AUTO: 94.3 % — HIGH (ref 43–77)
NEUTROPHILS NFR BLD AUTO: 95.8 % — HIGH (ref 43–77)
NITRITE UR-MCNC: NEGATIVE — SIGNIFICANT CHANGE UP
NITRITE UR-MCNC: SIGNIFICANT CHANGE UP
NRBC # BLD: 0 /100 WBCS — SIGNIFICANT CHANGE UP (ref 0–0)
NT-PROBNP SERPL-SCNC: 5486 PG/ML — HIGH (ref 0–300)
NT-PROBNP SERPL-SCNC: HIGH PG/ML (ref 0–300)
OB PNL STL: POSITIVE
ORGANISM # SPEC MICROSCOPIC CNT: SIGNIFICANT CHANGE UP
OSMOLALITY SERPL: 274 MOSMOL/KG — LOW (ref 280–301)
OSMOLALITY UR: 421 MOS/KG — SIGNIFICANT CHANGE UP (ref 300–900)
OTHER CELLS CSF MANUAL: 7.8 ML/DL — LOW (ref 18–22)
OTHER CELLS CSF MANUAL: 8 ML/DL — LOW (ref 18–22)
OVALOCYTES BLD QL SMEAR: SLIGHT — SIGNIFICANT CHANGE UP
PCO2 BLDV: 43 MMHG — SIGNIFICANT CHANGE UP (ref 42–55)
PCO2 BLDV: 44 MMHG — SIGNIFICANT CHANGE UP (ref 42–55)
PCO2 BLDV: 49 MMHG — SIGNIFICANT CHANGE UP (ref 42–55)
PH BLDV: 7.3 — LOW (ref 7.32–7.43)
PH BLDV: 7.33 — SIGNIFICANT CHANGE UP (ref 7.32–7.43)
PH BLDV: 7.39 — SIGNIFICANT CHANGE UP (ref 7.32–7.43)
PH UR: 5.5 — SIGNIFICANT CHANGE UP (ref 5–8)
PH UR: 6 — SIGNIFICANT CHANGE UP (ref 5–8)
PH UR: 7 — SIGNIFICANT CHANGE UP (ref 5–8)
PH UR: SIGNIFICANT CHANGE UP (ref 5–8)
PHOSPHATE SERPL-MCNC: 1.9 MG/DL — LOW (ref 2.5–4.5)
PHOSPHATE SERPL-MCNC: 1.9 MG/DL — LOW (ref 2.5–4.5)
PHOSPHATE SERPL-MCNC: 2.1 MG/DL — LOW (ref 2.5–4.5)
PHOSPHATE SERPL-MCNC: 2.2 MG/DL — LOW (ref 2.5–4.5)
PHOSPHATE SERPL-MCNC: 2.3 MG/DL — LOW (ref 2.5–4.5)
PHOSPHATE SERPL-MCNC: 2.4 MG/DL — LOW (ref 2.5–4.5)
PHOSPHATE SERPL-MCNC: 2.4 MG/DL — LOW (ref 2.5–4.5)
PHOSPHATE SERPL-MCNC: 2.5 MG/DL — SIGNIFICANT CHANGE UP (ref 2.5–4.5)
PHOSPHATE SERPL-MCNC: 2.7 MG/DL — SIGNIFICANT CHANGE UP (ref 2.5–4.5)
PHOSPHATE SERPL-MCNC: 2.7 MG/DL — SIGNIFICANT CHANGE UP (ref 2.5–4.5)
PHOSPHATE SERPL-MCNC: 2.8 MG/DL — SIGNIFICANT CHANGE UP (ref 2.5–4.5)
PHOSPHATE SERPL-MCNC: 2.8 MG/DL — SIGNIFICANT CHANGE UP (ref 2.5–4.5)
PHOSPHATE SERPL-MCNC: 2.9 MG/DL — SIGNIFICANT CHANGE UP (ref 2.5–4.5)
PHOSPHATE SERPL-MCNC: 3 MG/DL — SIGNIFICANT CHANGE UP (ref 2.5–4.5)
PHOSPHATE SERPL-MCNC: 3.1 MG/DL — SIGNIFICANT CHANGE UP (ref 2.5–4.5)
PHOSPHATE SERPL-MCNC: 3.2 MG/DL — SIGNIFICANT CHANGE UP (ref 2.5–4.5)
PHOSPHATE SERPL-MCNC: 3.3 MG/DL — SIGNIFICANT CHANGE UP (ref 2.5–4.5)
PHOSPHATE SERPL-MCNC: 3.3 MG/DL — SIGNIFICANT CHANGE UP (ref 2.5–4.5)
PHOSPHATE SERPL-MCNC: 3.4 MG/DL — SIGNIFICANT CHANGE UP (ref 2.5–4.5)
PHOSPHATE SERPL-MCNC: 3.4 MG/DL — SIGNIFICANT CHANGE UP (ref 2.5–4.5)
PHOSPHATE SERPL-MCNC: 3.5 MG/DL — SIGNIFICANT CHANGE UP (ref 2.5–4.5)
PHOSPHATE SERPL-MCNC: 3.6 MG/DL — SIGNIFICANT CHANGE UP (ref 2.5–4.5)
PHOSPHATE SERPL-MCNC: 3.9 MG/DL — SIGNIFICANT CHANGE UP (ref 2.5–4.5)
PHOSPHATE SERPL-MCNC: 4 MG/DL — SIGNIFICANT CHANGE UP (ref 2.5–4.5)
PLAT MORPH BLD: NORMAL — SIGNIFICANT CHANGE UP
PLAT MORPH BLD: NORMAL — SIGNIFICANT CHANGE UP
PLATELET # BLD AUTO: 101 K/UL — LOW (ref 150–400)
PLATELET # BLD AUTO: 107 K/UL — LOW (ref 150–400)
PLATELET # BLD AUTO: 108 K/UL — LOW (ref 150–400)
PLATELET # BLD AUTO: 114 K/UL — LOW (ref 150–400)
PLATELET # BLD AUTO: 115 K/UL — LOW (ref 150–400)
PLATELET # BLD AUTO: 117 K/UL — LOW (ref 150–400)
PLATELET # BLD AUTO: 128 K/UL — LOW (ref 150–400)
PLATELET # BLD AUTO: 129 K/UL — LOW (ref 150–400)
PLATELET # BLD AUTO: 135 K/UL — LOW (ref 150–400)
PLATELET # BLD AUTO: 135 K/UL — LOW (ref 150–400)
PLATELET # BLD AUTO: 136 K/UL — LOW (ref 150–400)
PLATELET # BLD AUTO: 140 K/UL — LOW (ref 150–400)
PLATELET # BLD AUTO: 142 K/UL — LOW (ref 150–400)
PLATELET # BLD AUTO: 145 K/UL — LOW (ref 150–400)
PLATELET # BLD AUTO: 148 K/UL — LOW (ref 150–400)
PLATELET # BLD AUTO: 149 K/UL — LOW (ref 150–400)
PLATELET # BLD AUTO: 151 K/UL — SIGNIFICANT CHANGE UP (ref 150–400)
PLATELET # BLD AUTO: 153 K/UL — SIGNIFICANT CHANGE UP (ref 150–400)
PLATELET # BLD AUTO: 154 K/UL — SIGNIFICANT CHANGE UP (ref 150–400)
PLATELET # BLD AUTO: 159 K/UL — SIGNIFICANT CHANGE UP (ref 150–400)
PLATELET # BLD AUTO: 163 K/UL — SIGNIFICANT CHANGE UP (ref 150–400)
PLATELET # BLD AUTO: 165 K/UL — SIGNIFICANT CHANGE UP (ref 150–400)
PLATELET # BLD AUTO: 166 K/UL — SIGNIFICANT CHANGE UP (ref 150–400)
PLATELET # BLD AUTO: 175 K/UL — SIGNIFICANT CHANGE UP (ref 150–400)
PLATELET # BLD AUTO: 175 K/UL — SIGNIFICANT CHANGE UP (ref 150–400)
PLATELET # BLD AUTO: 177 K/UL — SIGNIFICANT CHANGE UP (ref 150–400)
PLATELET # BLD AUTO: 180 K/UL — SIGNIFICANT CHANGE UP (ref 150–400)
PLATELET # BLD AUTO: 181 K/UL — SIGNIFICANT CHANGE UP (ref 150–400)
PLATELET # BLD AUTO: 184 K/UL — SIGNIFICANT CHANGE UP (ref 150–400)
PLATELET # BLD AUTO: 184 K/UL — SIGNIFICANT CHANGE UP (ref 150–400)
PLATELET # BLD AUTO: 185 K/UL — SIGNIFICANT CHANGE UP (ref 150–400)
PLATELET # BLD AUTO: 186 K/UL — SIGNIFICANT CHANGE UP (ref 150–400)
PLATELET # BLD AUTO: 188 K/UL — SIGNIFICANT CHANGE UP (ref 150–400)
PLATELET # BLD AUTO: 191 K/UL — SIGNIFICANT CHANGE UP (ref 150–400)
PLATELET # BLD AUTO: 192 K/UL — SIGNIFICANT CHANGE UP (ref 150–400)
PLATELET # BLD AUTO: 193 K/UL — SIGNIFICANT CHANGE UP (ref 150–400)
PLATELET # BLD AUTO: 195 K/UL — SIGNIFICANT CHANGE UP (ref 150–400)
PLATELET # BLD AUTO: 195 K/UL — SIGNIFICANT CHANGE UP (ref 150–400)
PLATELET # BLD AUTO: 196 K/UL — SIGNIFICANT CHANGE UP (ref 150–400)
PLATELET # BLD AUTO: 198 K/UL — SIGNIFICANT CHANGE UP (ref 150–400)
PLATELET # BLD AUTO: 199 K/UL — SIGNIFICANT CHANGE UP (ref 150–400)
PLATELET # BLD AUTO: 199 K/UL — SIGNIFICANT CHANGE UP (ref 150–400)
PLATELET # BLD AUTO: 200 K/UL — SIGNIFICANT CHANGE UP (ref 150–400)
PLATELET # BLD AUTO: 200 K/UL — SIGNIFICANT CHANGE UP (ref 150–400)
PLATELET # BLD AUTO: 202 K/UL — SIGNIFICANT CHANGE UP (ref 150–400)
PLATELET # BLD AUTO: 203 K/UL — SIGNIFICANT CHANGE UP (ref 150–400)
PLATELET # BLD AUTO: 204 K/UL — SIGNIFICANT CHANGE UP (ref 150–400)
PLATELET # BLD AUTO: 204 K/UL — SIGNIFICANT CHANGE UP (ref 150–400)
PLATELET # BLD AUTO: 208 K/UL — SIGNIFICANT CHANGE UP (ref 150–400)
PLATELET # BLD AUTO: 210 K/UL — SIGNIFICANT CHANGE UP (ref 150–400)
PLATELET # BLD AUTO: 210 K/UL — SIGNIFICANT CHANGE UP (ref 150–400)
PLATELET # BLD AUTO: 212 K/UL — SIGNIFICANT CHANGE UP (ref 150–400)
PLATELET # BLD AUTO: 214 K/UL — SIGNIFICANT CHANGE UP (ref 150–400)
PLATELET # BLD AUTO: 215 K/UL — SIGNIFICANT CHANGE UP (ref 150–400)
PLATELET # BLD AUTO: 218 K/UL — SIGNIFICANT CHANGE UP (ref 150–400)
PLATELET # BLD AUTO: 219 K/UL — SIGNIFICANT CHANGE UP (ref 150–400)
PLATELET # BLD AUTO: 219 K/UL — SIGNIFICANT CHANGE UP (ref 150–400)
PLATELET # BLD AUTO: 220 K/UL — SIGNIFICANT CHANGE UP (ref 150–400)
PLATELET # BLD AUTO: 221 K/UL — SIGNIFICANT CHANGE UP (ref 150–400)
PLATELET # BLD AUTO: 225 K/UL — SIGNIFICANT CHANGE UP (ref 150–400)
PLATELET # BLD AUTO: 227 K/UL — SIGNIFICANT CHANGE UP (ref 150–400)
PLATELET # BLD AUTO: 227 K/UL — SIGNIFICANT CHANGE UP (ref 150–400)
PLATELET # BLD AUTO: 236 K/UL — SIGNIFICANT CHANGE UP (ref 150–400)
PLATELET # BLD AUTO: 239 K/UL — SIGNIFICANT CHANGE UP (ref 150–400)
PLATELET # BLD AUTO: 74 K/UL — LOW (ref 150–400)
PLATELET # BLD AUTO: 80 K/UL — LOW (ref 150–400)
PLATELET # BLD AUTO: 83 K/UL — LOW (ref 150–400)
PLATELET # BLD AUTO: 85 K/UL — LOW (ref 150–400)
PLATELET # BLD AUTO: 86 K/UL — LOW (ref 150–400)
PLATELET # BLD AUTO: 87 K/UL — LOW (ref 150–400)
PLATELET # BLD AUTO: 87 K/UL — LOW (ref 150–400)
PLATELET # BLD AUTO: 90 K/UL — LOW (ref 150–400)
PLATELET # BLD AUTO: 96 K/UL — LOW (ref 150–400)
PO2 BLDV: 50 MMHG — HIGH (ref 25–45)
PO2 BLDV: 51 MMHG — HIGH (ref 25–45)
PO2 BLDV: 56 MMHG — HIGH (ref 25–45)
POIKILOCYTOSIS BLD QL AUTO: SLIGHT — SIGNIFICANT CHANGE UP
POIKILOCYTOSIS BLD QL AUTO: SLIGHT — SIGNIFICANT CHANGE UP
POLYCHROMASIA BLD QL SMEAR: SLIGHT — SIGNIFICANT CHANGE UP
POTASSIUM BLDV-SCNC: 4.4 MMOL/L — SIGNIFICANT CHANGE UP (ref 3.5–5.1)
POTASSIUM BLDV-SCNC: 4.6 MMOL/L — SIGNIFICANT CHANGE UP (ref 3.5–5.1)
POTASSIUM BLDV-SCNC: 4.6 MMOL/L — SIGNIFICANT CHANGE UP (ref 3.5–5.1)
POTASSIUM SERPL-MCNC: 3.6 MMOL/L — SIGNIFICANT CHANGE UP (ref 3.5–5.3)
POTASSIUM SERPL-MCNC: 3.7 MMOL/L — SIGNIFICANT CHANGE UP (ref 3.5–5.3)
POTASSIUM SERPL-MCNC: 3.7 MMOL/L — SIGNIFICANT CHANGE UP (ref 3.5–5.3)
POTASSIUM SERPL-MCNC: 3.8 MMOL/L — SIGNIFICANT CHANGE UP (ref 3.5–5.3)
POTASSIUM SERPL-MCNC: 3.9 MMOL/L — SIGNIFICANT CHANGE UP (ref 3.5–5.3)
POTASSIUM SERPL-MCNC: 4 MMOL/L — SIGNIFICANT CHANGE UP (ref 3.5–5.3)
POTASSIUM SERPL-MCNC: 4.1 MMOL/L — SIGNIFICANT CHANGE UP (ref 3.5–5.3)
POTASSIUM SERPL-MCNC: 4.2 MMOL/L — SIGNIFICANT CHANGE UP (ref 3.5–5.3)
POTASSIUM SERPL-MCNC: 4.3 MMOL/L — SIGNIFICANT CHANGE UP (ref 3.5–5.3)
POTASSIUM SERPL-MCNC: 4.4 MMOL/L — SIGNIFICANT CHANGE UP (ref 3.5–5.3)
POTASSIUM SERPL-MCNC: 4.4 MMOL/L — SIGNIFICANT CHANGE UP (ref 3.5–5.3)
POTASSIUM SERPL-MCNC: 4.5 MMOL/L — SIGNIFICANT CHANGE UP (ref 3.5–5.3)
POTASSIUM SERPL-MCNC: 4.5 MMOL/L — SIGNIFICANT CHANGE UP (ref 3.5–5.3)
POTASSIUM SERPL-MCNC: 4.6 MMOL/L — SIGNIFICANT CHANGE UP (ref 3.5–5.3)
POTASSIUM SERPL-MCNC: 4.7 MMOL/L — SIGNIFICANT CHANGE UP (ref 3.5–5.3)
POTASSIUM SERPL-MCNC: 4.7 MMOL/L — SIGNIFICANT CHANGE UP (ref 3.5–5.3)
POTASSIUM SERPL-MCNC: 4.8 MMOL/L — SIGNIFICANT CHANGE UP (ref 3.5–5.3)
POTASSIUM SERPL-MCNC: 4.8 MMOL/L — SIGNIFICANT CHANGE UP (ref 3.5–5.3)
POTASSIUM SERPL-MCNC: 4.9 MMOL/L — SIGNIFICANT CHANGE UP (ref 3.5–5.3)
POTASSIUM SERPL-MCNC: 5 MMOL/L — SIGNIFICANT CHANGE UP (ref 3.5–5.3)
POTASSIUM SERPL-MCNC: 5.2 MMOL/L — SIGNIFICANT CHANGE UP (ref 3.5–5.3)
POTASSIUM SERPL-MCNC: 5.5 MMOL/L — HIGH (ref 3.5–5.3)
POTASSIUM SERPL-MCNC: 5.5 MMOL/L — HIGH (ref 3.5–5.3)
POTASSIUM SERPL-MCNC: 5.6 MMOL/L — HIGH (ref 3.5–5.3)
POTASSIUM SERPL-MCNC: 5.7 MMOL/L — HIGH (ref 3.5–5.3)
POTASSIUM SERPL-MCNC: 5.9 MMOL/L — HIGH (ref 3.5–5.3)
POTASSIUM SERPL-MCNC: 6.1 MMOL/L — HIGH (ref 3.5–5.3)
POTASSIUM SERPL-SCNC: 3.6 MMOL/L — SIGNIFICANT CHANGE UP (ref 3.5–5.3)
POTASSIUM SERPL-SCNC: 3.7 MMOL/L — SIGNIFICANT CHANGE UP (ref 3.5–5.3)
POTASSIUM SERPL-SCNC: 3.7 MMOL/L — SIGNIFICANT CHANGE UP (ref 3.5–5.3)
POTASSIUM SERPL-SCNC: 3.8 MMOL/L — SIGNIFICANT CHANGE UP (ref 3.5–5.3)
POTASSIUM SERPL-SCNC: 3.9 MMOL/L — SIGNIFICANT CHANGE UP (ref 3.5–5.3)
POTASSIUM SERPL-SCNC: 4 MMOL/L — SIGNIFICANT CHANGE UP (ref 3.5–5.3)
POTASSIUM SERPL-SCNC: 4.1 MMOL/L — SIGNIFICANT CHANGE UP (ref 3.5–5.3)
POTASSIUM SERPL-SCNC: 4.2 MMOL/L — SIGNIFICANT CHANGE UP (ref 3.5–5.3)
POTASSIUM SERPL-SCNC: 4.3 MMOL/L — SIGNIFICANT CHANGE UP (ref 3.5–5.3)
POTASSIUM SERPL-SCNC: 4.4 MMOL/L — SIGNIFICANT CHANGE UP (ref 3.5–5.3)
POTASSIUM SERPL-SCNC: 4.4 MMOL/L — SIGNIFICANT CHANGE UP (ref 3.5–5.3)
POTASSIUM SERPL-SCNC: 4.5 MMOL/L — SIGNIFICANT CHANGE UP (ref 3.5–5.3)
POTASSIUM SERPL-SCNC: 4.5 MMOL/L — SIGNIFICANT CHANGE UP (ref 3.5–5.3)
POTASSIUM SERPL-SCNC: 4.6 MMOL/L — SIGNIFICANT CHANGE UP (ref 3.5–5.3)
POTASSIUM SERPL-SCNC: 4.7 MMOL/L — SIGNIFICANT CHANGE UP (ref 3.5–5.3)
POTASSIUM SERPL-SCNC: 4.7 MMOL/L — SIGNIFICANT CHANGE UP (ref 3.5–5.3)
POTASSIUM SERPL-SCNC: 4.8 MMOL/L — SIGNIFICANT CHANGE UP (ref 3.5–5.3)
POTASSIUM SERPL-SCNC: 4.8 MMOL/L — SIGNIFICANT CHANGE UP (ref 3.5–5.3)
POTASSIUM SERPL-SCNC: 4.9 MMOL/L — SIGNIFICANT CHANGE UP (ref 3.5–5.3)
POTASSIUM SERPL-SCNC: 5 MMOL/L — SIGNIFICANT CHANGE UP (ref 3.5–5.3)
POTASSIUM SERPL-SCNC: 5.2 MMOL/L — SIGNIFICANT CHANGE UP (ref 3.5–5.3)
POTASSIUM SERPL-SCNC: 5.5 MMOL/L — HIGH (ref 3.5–5.3)
POTASSIUM SERPL-SCNC: 5.5 MMOL/L — HIGH (ref 3.5–5.3)
POTASSIUM SERPL-SCNC: 5.6 MMOL/L — HIGH (ref 3.5–5.3)
POTASSIUM SERPL-SCNC: 5.7 MMOL/L — HIGH (ref 3.5–5.3)
POTASSIUM SERPL-SCNC: 5.9 MMOL/L — HIGH (ref 3.5–5.3)
POTASSIUM SERPL-SCNC: 6.1 MMOL/L — HIGH (ref 3.5–5.3)
POTASSIUM UR-SCNC: 54 MMOL/L — SIGNIFICANT CHANGE UP
PREALB SERPL-MCNC: 6 MG/DL — LOW (ref 20–40)
PREALB SERPL-MCNC: 9 MG/DL — LOW (ref 20–40)
PROCALCITONIN SERPL-MCNC: 10.93 NG/ML — HIGH (ref 0.02–0.1)
PROCALCITONIN SERPL-MCNC: 6.34 NG/ML — HIGH (ref 0.02–0.1)
PROT ?TM UR-MCNC: 101 MG/DL — HIGH (ref 0–12)
PROT SERPL-MCNC: 10 G/DL — HIGH (ref 6–8.3)
PROT SERPL-MCNC: 7.1 G/DL — SIGNIFICANT CHANGE UP (ref 6–8.3)
PROT SERPL-MCNC: 7.1 G/DL — SIGNIFICANT CHANGE UP (ref 6–8.3)
PROT SERPL-MCNC: 7.2 G/DL — SIGNIFICANT CHANGE UP (ref 6–8.3)
PROT SERPL-MCNC: 7.3 G/DL — SIGNIFICANT CHANGE UP (ref 6–8.3)
PROT SERPL-MCNC: 7.3 G/DL — SIGNIFICANT CHANGE UP (ref 6–8.3)
PROT SERPL-MCNC: 7.4 G/DL — SIGNIFICANT CHANGE UP (ref 6–8.3)
PROT SERPL-MCNC: 7.6 G/DL — SIGNIFICANT CHANGE UP (ref 6–8.3)
PROT SERPL-MCNC: 7.8 G/DL — SIGNIFICANT CHANGE UP (ref 6–8.3)
PROT SERPL-MCNC: 8 G/DL — SIGNIFICANT CHANGE UP (ref 6–8.3)
PROT SERPL-MCNC: 8.3 G/DL — SIGNIFICANT CHANGE UP (ref 6–8.3)
PROT SERPL-MCNC: 8.4 G/DL — HIGH (ref 6–8.3)
PROT SERPL-MCNC: 8.5 G/DL — HIGH (ref 6–8.3)
PROT SERPL-MCNC: 8.6 G/DL — HIGH (ref 6–8.3)
PROT SERPL-MCNC: 8.7 G/DL — HIGH (ref 6–8.3)
PROT SERPL-MCNC: 8.7 G/DL — HIGH (ref 6–8.3)
PROT SERPL-MCNC: 8.8 G/DL — HIGH (ref 6–8.3)
PROT SERPL-MCNC: 8.9 G/DL — HIGH (ref 6–8.3)
PROT SERPL-MCNC: 9 G/DL — HIGH (ref 6–8.3)
PROT SERPL-MCNC: 9 G/DL — HIGH (ref 6–8.3)
PROT SERPL-MCNC: 9.1 G/DL — HIGH (ref 6–8.3)
PROT SERPL-MCNC: 9.2 G/DL — HIGH (ref 6–8.3)
PROT SERPL-MCNC: 9.3 G/DL — HIGH (ref 6–8.3)
PROT SERPL-MCNC: 9.4 G/DL — HIGH (ref 6–8.3)
PROT SERPL-MCNC: 9.5 G/DL — HIGH (ref 6–8.3)
PROT SERPL-MCNC: 9.7 G/DL — HIGH (ref 6–8.3)
PROT SERPL-MCNC: 9.8 G/DL — HIGH (ref 6–8.3)
PROT SERPL-MCNC: 9.9 G/DL — HIGH (ref 6–8.3)
PROT SERPL-MCNC: 9.9 G/DL — HIGH (ref 6–8.3)
PROT SERPL-MCNC: SIGNIFICANT CHANGE UP G/DL (ref 6–8.3)
PROT UR-MCNC: 100 — SIGNIFICANT CHANGE UP
PROT UR-MCNC: ABNORMAL
PROT UR-MCNC: ABNORMAL
PROT UR-MCNC: SIGNIFICANT CHANGE UP
PROTHROM AB SERPL-ACNC: 13.3 SEC — SIGNIFICANT CHANGE UP (ref 10.5–13.4)
PROTHROM AB SERPL-ACNC: 13.3 SEC — SIGNIFICANT CHANGE UP (ref 10.5–13.4)
PROTHROM AB SERPL-ACNC: 15.2 SEC — HIGH (ref 10.5–13.4)
PROTHROM AB SERPL-ACNC: 16 SEC — HIGH (ref 10.5–13.4)
PROTHROM AB SERPL-ACNC: 16.1 SEC — HIGH (ref 10.5–13.4)
PROTHROM AB SERPL-ACNC: 19.1 SEC — HIGH (ref 10.5–13.4)
RAPID RVP RESULT: SIGNIFICANT CHANGE UP
RBC # BLD: 1.6 M/UL — LOW (ref 4.2–5.8)
RBC # BLD: 1.79 M/UL — LOW (ref 4.2–5.8)
RBC # BLD: 1.92 M/UL — LOW (ref 4.2–5.8)
RBC # BLD: 1.96 M/UL — LOW (ref 4.2–5.8)
RBC # BLD: 2.27 M/UL — LOW (ref 4.2–5.8)
RBC # BLD: 2.28 M/UL — LOW (ref 4.2–5.8)
RBC # BLD: 2.3 M/UL — LOW (ref 4.2–5.8)
RBC # BLD: 2.31 M/UL — LOW (ref 4.2–5.8)
RBC # BLD: 2.36 M/UL — LOW (ref 4.2–5.8)
RBC # BLD: 2.36 M/UL — LOW (ref 4.2–5.8)
RBC # BLD: 2.37 M/UL — LOW (ref 4.2–5.8)
RBC # BLD: 2.4 M/UL — LOW (ref 4.2–5.8)
RBC # BLD: 2.43 M/UL — LOW (ref 4.2–5.8)
RBC # BLD: 2.45 M/UL — LOW (ref 4.2–5.8)
RBC # BLD: 2.49 M/UL — LOW (ref 4.2–5.8)
RBC # BLD: 2.55 M/UL — LOW (ref 4.2–5.8)
RBC # BLD: 2.55 M/UL — LOW (ref 4.2–5.8)
RBC # BLD: 2.57 M/UL — LOW (ref 4.2–5.8)
RBC # BLD: 2.59 M/UL — LOW (ref 4.2–5.8)
RBC # BLD: 2.61 M/UL — LOW (ref 4.2–5.8)
RBC # BLD: 2.62 M/UL — LOW (ref 4.2–5.8)
RBC # BLD: 2.63 M/UL — LOW (ref 4.2–5.8)
RBC # BLD: 2.63 M/UL — LOW (ref 4.2–5.8)
RBC # BLD: 2.64 M/UL — LOW (ref 4.2–5.8)
RBC # BLD: 2.64 M/UL — LOW (ref 4.2–5.8)
RBC # BLD: 2.65 M/UL — LOW (ref 4.2–5.8)
RBC # BLD: 2.65 M/UL — LOW (ref 4.2–5.8)
RBC # BLD: 2.67 M/UL — LOW (ref 4.2–5.8)
RBC # BLD: 2.73 M/UL — LOW (ref 4.2–5.8)
RBC # BLD: 2.74 M/UL — LOW (ref 4.2–5.8)
RBC # BLD: 2.77 M/UL — LOW (ref 4.2–5.8)
RBC # BLD: 2.78 M/UL — LOW (ref 4.2–5.8)
RBC # BLD: 2.79 M/UL — LOW (ref 4.2–5.8)
RBC # BLD: 2.79 M/UL — LOW (ref 4.2–5.8)
RBC # BLD: 2.8 M/UL — LOW (ref 4.2–5.8)
RBC # BLD: 2.8 M/UL — LOW (ref 4.2–5.8)
RBC # BLD: 2.82 M/UL — LOW (ref 4.2–5.8)
RBC # BLD: 2.83 M/UL — LOW (ref 4.2–5.8)
RBC # BLD: 2.84 M/UL — LOW (ref 4.2–5.8)
RBC # BLD: 2.84 M/UL — LOW (ref 4.2–5.8)
RBC # BLD: 2.86 M/UL — LOW (ref 4.2–5.8)
RBC # BLD: 2.87 M/UL — LOW (ref 4.2–5.8)
RBC # BLD: 2.93 M/UL — LOW (ref 4.2–5.8)
RBC # BLD: 2.93 M/UL — LOW (ref 4.2–5.8)
RBC # BLD: 2.94 M/UL — LOW (ref 4.2–5.8)
RBC # BLD: 2.94 M/UL — LOW (ref 4.2–5.8)
RBC # BLD: 2.95 M/UL — LOW (ref 4.2–5.8)
RBC # BLD: 2.96 M/UL — LOW (ref 4.2–5.8)
RBC # BLD: 2.98 M/UL — LOW (ref 4.2–5.8)
RBC # BLD: 2.99 M/UL — LOW (ref 4.2–5.8)
RBC # BLD: 3.01 M/UL — LOW (ref 4.2–5.8)
RBC # BLD: 3.02 M/UL — LOW (ref 4.2–5.8)
RBC # BLD: 3.04 M/UL — LOW (ref 4.2–5.8)
RBC # BLD: 3.04 M/UL — LOW (ref 4.2–5.8)
RBC # BLD: 3.05 M/UL — LOW (ref 4.2–5.8)
RBC # BLD: 3.07 M/UL — LOW (ref 4.2–5.8)
RBC # BLD: 3.11 M/UL — LOW (ref 4.2–5.8)
RBC # BLD: 3.12 M/UL — LOW (ref 4.2–5.8)
RBC # BLD: 3.12 M/UL — LOW (ref 4.2–5.8)
RBC # BLD: 3.15 M/UL — LOW (ref 4.2–5.8)
RBC # BLD: 3.16 M/UL — LOW (ref 4.2–5.8)
RBC # BLD: 3.17 M/UL — LOW (ref 4.2–5.8)
RBC # BLD: 3.18 M/UL — LOW (ref 4.2–5.8)
RBC # BLD: 3.18 M/UL — LOW (ref 4.2–5.8)
RBC # BLD: 3.22 M/UL — LOW (ref 4.2–5.8)
RBC # BLD: 3.23 M/UL — LOW (ref 4.2–5.8)
RBC # BLD: 3.25 M/UL — LOW (ref 4.2–5.8)
RBC # BLD: 3.26 M/UL — LOW (ref 4.2–5.8)
RBC # BLD: 3.27 M/UL — LOW (ref 4.2–5.8)
RBC # BLD: 3.3 M/UL — LOW (ref 4.2–5.8)
RBC # BLD: 3.31 M/UL — LOW (ref 4.2–5.8)
RBC # BLD: 3.34 M/UL — LOW (ref 4.2–5.8)
RBC # BLD: 3.35 M/UL — LOW (ref 4.2–5.8)
RBC # BLD: 3.38 M/UL — LOW (ref 4.2–5.8)
RBC # BLD: 3.67 M/UL — LOW (ref 4.2–5.8)
RBC # BLD: 3.9 M/UL — LOW (ref 4.2–5.8)
RBC # FLD: 15.4 % — HIGH (ref 10.3–14.5)
RBC # FLD: 15.4 % — HIGH (ref 10.3–14.5)
RBC # FLD: 15.5 % — HIGH (ref 10.3–14.5)
RBC # FLD: 15.5 % — HIGH (ref 10.3–14.5)
RBC # FLD: 15.7 % — HIGH (ref 10.3–14.5)
RBC # FLD: 15.8 % — HIGH (ref 10.3–14.5)
RBC # FLD: 15.9 % — HIGH (ref 10.3–14.5)
RBC # FLD: 16 % — HIGH (ref 10.3–14.5)
RBC # FLD: 16.1 % — HIGH (ref 10.3–14.5)
RBC # FLD: 16.2 % — HIGH (ref 10.3–14.5)
RBC # FLD: 16.3 % — HIGH (ref 10.3–14.5)
RBC # FLD: 16.4 % — HIGH (ref 10.3–14.5)
RBC # FLD: 16.5 % — HIGH (ref 10.3–14.5)
RBC # FLD: 16.6 % — HIGH (ref 10.3–14.5)
RBC # FLD: 16.7 % — HIGH (ref 10.3–14.5)
RBC # FLD: 16.8 % — HIGH (ref 10.3–14.5)
RBC # FLD: 16.9 % — HIGH (ref 10.3–14.5)
RBC # FLD: 17 % — HIGH (ref 10.3–14.5)
RBC # FLD: 17.1 % — HIGH (ref 10.3–14.5)
RBC # FLD: 17.2 % — HIGH (ref 10.3–14.5)
RBC # FLD: 17.3 % — HIGH (ref 10.3–14.5)
RBC # FLD: 17.4 % — HIGH (ref 10.3–14.5)
RBC # FLD: 17.5 % — HIGH (ref 10.3–14.5)
RBC # FLD: 17.9 % — HIGH (ref 10.3–14.5)
RBC BLD AUTO: ABNORMAL
RBC BLD AUTO: ABNORMAL
RBC CASTS # UR COMP ASSIST: 199 /HPF — HIGH (ref 0–4)
RBC CASTS # UR COMP ASSIST: 33 /HPF — HIGH (ref 0–4)
RETICS #: 28.8 K/UL — SIGNIFICANT CHANGE UP (ref 25–125)
RETICS/RBC NFR: 1.1 % — SIGNIFICANT CHANGE UP (ref 0.5–2.5)
RH IG SCN BLD-IMP: POSITIVE — SIGNIFICANT CHANGE UP
S AUREUS DNA NOSE QL NAA+PROBE: SIGNIFICANT CHANGE UP
S MARCESCENS DNA BLD POS NAA+NON-PROBE: SIGNIFICANT CHANGE UP
S MARCESCENS DNA BLD POS NAA+NON-PROBE: SIGNIFICANT CHANGE UP
SAO2 % BLDV: 76.6 % — SIGNIFICANT CHANGE UP (ref 67–88)
SAO2 % BLDV: 81.8 % — SIGNIFICANT CHANGE UP (ref 67–88)
SAO2 % BLDV: 82.2 % — SIGNIFICANT CHANGE UP (ref 67–88)
SARS-COV-2 RNA SPEC QL NAA+PROBE: SIGNIFICANT CHANGE UP
SCHISTOCYTES BLD QL AUTO: SLIGHT — SIGNIFICANT CHANGE UP
SODIUM SERPL-SCNC: 125 MMOL/L — LOW (ref 135–145)
SODIUM SERPL-SCNC: 126 MMOL/L — LOW (ref 135–145)
SODIUM SERPL-SCNC: 126 MMOL/L — LOW (ref 135–145)
SODIUM SERPL-SCNC: 127 MMOL/L — LOW (ref 135–145)
SODIUM SERPL-SCNC: 128 MMOL/L — LOW (ref 135–145)
SODIUM SERPL-SCNC: 129 MMOL/L — LOW (ref 135–145)
SODIUM SERPL-SCNC: 130 MMOL/L — LOW (ref 135–145)
SODIUM SERPL-SCNC: 131 MMOL/L — LOW (ref 135–145)
SODIUM SERPL-SCNC: 132 MMOL/L — LOW (ref 135–145)
SODIUM SERPL-SCNC: 133 MMOL/L — LOW (ref 135–145)
SODIUM SERPL-SCNC: 134 MMOL/L — LOW (ref 135–145)
SODIUM SERPL-SCNC: 135 MMOL/L — SIGNIFICANT CHANGE UP (ref 135–145)
SODIUM SERPL-SCNC: 136 MMOL/L — SIGNIFICANT CHANGE UP (ref 135–145)
SODIUM SERPL-SCNC: 138 MMOL/L — SIGNIFICANT CHANGE UP (ref 135–145)
SODIUM SERPL-SCNC: 139 MMOL/L — SIGNIFICANT CHANGE UP (ref 135–145)
SODIUM SERPL-SCNC: 139 MMOL/L — SIGNIFICANT CHANGE UP (ref 135–145)
SODIUM SERPL-SCNC: 140 MMOL/L — SIGNIFICANT CHANGE UP (ref 135–145)
SODIUM SERPL-SCNC: 142 MMOL/L — SIGNIFICANT CHANGE UP (ref 135–145)
SODIUM SERPL-SCNC: 142 MMOL/L — SIGNIFICANT CHANGE UP (ref 135–145)
SODIUM SERPL-SCNC: 143 MMOL/L — SIGNIFICANT CHANGE UP (ref 135–145)
SODIUM SERPL-SCNC: 144 MMOL/L — SIGNIFICANT CHANGE UP (ref 135–145)
SODIUM SERPL-SCNC: 145 MMOL/L — SIGNIFICANT CHANGE UP (ref 135–145)
SODIUM SERPL-SCNC: 148 MMOL/L — HIGH (ref 135–145)
SODIUM SERPL-SCNC: 150 MMOL/L — HIGH (ref 135–145)
SODIUM UR-SCNC: 69 MMOL/L — SIGNIFICANT CHANGE UP
SP GR SPEC: 1.02 — SIGNIFICANT CHANGE UP (ref 1.01–1.02)
SP GR SPEC: 1.04 — SIGNIFICANT CHANGE UP (ref 1.01–1.02)
SPECIMEN SOURCE: SIGNIFICANT CHANGE UP
T PALLIDUM AB TITR SER: NEGATIVE — SIGNIFICANT CHANGE UP
T3 SERPL-MCNC: 42 NG/DL — LOW (ref 80–200)
T3 SERPL-MCNC: 63 NG/DL — LOW (ref 80–200)
T3FREE SERPL-MCNC: 1.63 PG/ML — LOW (ref 1.8–4.6)
T4 AB SER-ACNC: 3.5 UG/DL — LOW (ref 4.6–12)
T4 FREE SERPL-MCNC: 0.6 NG/DL — LOW (ref 0.9–1.8)
T4 FREE SERPL-MCNC: 0.8 NG/DL — LOW (ref 0.9–1.8)
TIBC SERPL-MCNC: 159 UG/DL — LOW (ref 220–430)
TROPONIN T, HIGH SENSITIVITY RESULT: 13 NG/L — SIGNIFICANT CHANGE UP (ref 0–51)
TROPONIN T, HIGH SENSITIVITY RESULT: 24 NG/L — SIGNIFICANT CHANGE UP (ref 0–51)
TROPONIN T, HIGH SENSITIVITY RESULT: 32 NG/L — SIGNIFICANT CHANGE UP (ref 0–51)
TSH SERPL-MCNC: 12.7 UIU/ML — HIGH (ref 0.27–4.2)
TSH SERPL-MCNC: 13.5 UIU/ML — HIGH (ref 0.27–4.2)
TSH SERPL-MCNC: 2.68 UIU/ML — SIGNIFICANT CHANGE UP (ref 0.27–4.2)
UIBC SERPL-MCNC: 136 UG/DL — SIGNIFICANT CHANGE UP (ref 110–370)
URATE SERPL-MCNC: 5.4 MG/DL — SIGNIFICANT CHANGE UP (ref 3.4–8.8)
UROBILINOGEN FLD QL: NEGATIVE — SIGNIFICANT CHANGE UP
UROBILINOGEN FLD QL: SIGNIFICANT CHANGE UP
VANCOMYCIN FLD-MCNC: 8.5 UG/ML — SIGNIFICANT CHANGE UP
VANCOMYCIN TROUGH SERPL-MCNC: 14.3 UG/ML — SIGNIFICANT CHANGE UP (ref 10–20)
VANCOMYCIN TROUGH SERPL-MCNC: 15.3 UG/ML — SIGNIFICANT CHANGE UP (ref 10–20)
VANCOMYCIN TROUGH SERPL-MCNC: 15.7 UG/ML — SIGNIFICANT CHANGE UP (ref 10–20)
VIT B1 SERPL-MCNC: 196.7 NMOL/L — SIGNIFICANT CHANGE UP (ref 66.5–200)
VIT B12 SERPL-MCNC: 1053 PG/ML — SIGNIFICANT CHANGE UP (ref 232–1245)
WBC # BLD: 10.18 K/UL — SIGNIFICANT CHANGE UP (ref 3.8–10.5)
WBC # BLD: 10.2 K/UL — SIGNIFICANT CHANGE UP (ref 3.8–10.5)
WBC # BLD: 10.27 K/UL — SIGNIFICANT CHANGE UP (ref 3.8–10.5)
WBC # BLD: 10.53 K/UL — HIGH (ref 3.8–10.5)
WBC # BLD: 10.6 K/UL — HIGH (ref 3.8–10.5)
WBC # BLD: 10.76 K/UL — HIGH (ref 3.8–10.5)
WBC # BLD: 10.82 K/UL — HIGH (ref 3.8–10.5)
WBC # BLD: 11.16 K/UL — HIGH (ref 3.8–10.5)
WBC # BLD: 11.24 K/UL — HIGH (ref 3.8–10.5)
WBC # BLD: 11.4 K/UL — HIGH (ref 3.8–10.5)
WBC # BLD: 11.58 K/UL — HIGH (ref 3.8–10.5)
WBC # BLD: 11.86 K/UL — HIGH (ref 3.8–10.5)
WBC # BLD: 12.4 K/UL — HIGH (ref 3.8–10.5)
WBC # BLD: 12.46 K/UL — HIGH (ref 3.8–10.5)
WBC # BLD: 12.56 K/UL — HIGH (ref 3.8–10.5)
WBC # BLD: 12.62 K/UL — HIGH (ref 3.8–10.5)
WBC # BLD: 12.72 K/UL — HIGH (ref 3.8–10.5)
WBC # BLD: 13.09 K/UL — HIGH (ref 3.8–10.5)
WBC # BLD: 13.3 K/UL — HIGH (ref 3.8–10.5)
WBC # BLD: 13.47 K/UL — HIGH (ref 3.8–10.5)
WBC # BLD: 13.49 K/UL — HIGH (ref 3.8–10.5)
WBC # BLD: 13.54 K/UL — HIGH (ref 3.8–10.5)
WBC # BLD: 13.74 K/UL — HIGH (ref 3.8–10.5)
WBC # BLD: 14.28 K/UL — HIGH (ref 3.8–10.5)
WBC # BLD: 14.29 K/UL — HIGH (ref 3.8–10.5)
WBC # BLD: 14.43 K/UL — HIGH (ref 3.8–10.5)
WBC # BLD: 14.5 K/UL — HIGH (ref 3.8–10.5)
WBC # BLD: 14.56 K/UL — HIGH (ref 3.8–10.5)
WBC # BLD: 14.57 K/UL — HIGH (ref 3.8–10.5)
WBC # BLD: 15.34 K/UL — HIGH (ref 3.8–10.5)
WBC # BLD: 15.39 K/UL — HIGH (ref 3.8–10.5)
WBC # BLD: 15.8 K/UL — HIGH (ref 3.8–10.5)
WBC # BLD: 16.3 K/UL — HIGH (ref 3.8–10.5)
WBC # BLD: 16.6 K/UL — HIGH (ref 3.8–10.5)
WBC # BLD: 16.79 K/UL — HIGH (ref 3.8–10.5)
WBC # BLD: 17.47 K/UL — HIGH (ref 3.8–10.5)
WBC # BLD: 20.49 K/UL — HIGH (ref 3.8–10.5)
WBC # BLD: 21.24 K/UL — HIGH (ref 3.8–10.5)
WBC # BLD: 21.54 K/UL — HIGH (ref 3.8–10.5)
WBC # BLD: 21.6 K/UL — HIGH (ref 3.8–10.5)
WBC # BLD: 21.65 K/UL — HIGH (ref 3.8–10.5)
WBC # BLD: 22.53 K/UL — HIGH (ref 3.8–10.5)
WBC # BLD: 22.54 K/UL — HIGH (ref 3.8–10.5)
WBC # BLD: 23.62 K/UL — HIGH (ref 3.8–10.5)
WBC # BLD: 23.79 K/UL — HIGH (ref 3.8–10.5)
WBC # BLD: 24.36 K/UL — HIGH (ref 3.8–10.5)
WBC # BLD: 26.66 K/UL — HIGH (ref 3.8–10.5)
WBC # BLD: 26.98 K/UL — HIGH (ref 3.8–10.5)
WBC # BLD: 5.03 K/UL — SIGNIFICANT CHANGE UP (ref 3.8–10.5)
WBC # BLD: 5.47 K/UL — SIGNIFICANT CHANGE UP (ref 3.8–10.5)
WBC # BLD: 5.59 K/UL — SIGNIFICANT CHANGE UP (ref 3.8–10.5)
WBC # BLD: 6.18 K/UL — SIGNIFICANT CHANGE UP (ref 3.8–10.5)
WBC # BLD: 6.18 K/UL — SIGNIFICANT CHANGE UP (ref 3.8–10.5)
WBC # BLD: 6.2 K/UL — SIGNIFICANT CHANGE UP (ref 3.8–10.5)
WBC # BLD: 6.24 K/UL — SIGNIFICANT CHANGE UP (ref 3.8–10.5)
WBC # BLD: 6.35 K/UL — SIGNIFICANT CHANGE UP (ref 3.8–10.5)
WBC # BLD: 6.42 K/UL — SIGNIFICANT CHANGE UP (ref 3.8–10.5)
WBC # BLD: 6.74 K/UL — SIGNIFICANT CHANGE UP (ref 3.8–10.5)
WBC # BLD: 7.01 K/UL — SIGNIFICANT CHANGE UP (ref 3.8–10.5)
WBC # BLD: 7.04 K/UL — SIGNIFICANT CHANGE UP (ref 3.8–10.5)
WBC # BLD: 7.27 K/UL — SIGNIFICANT CHANGE UP (ref 3.8–10.5)
WBC # BLD: 7.33 K/UL — SIGNIFICANT CHANGE UP (ref 3.8–10.5)
WBC # BLD: 7.43 K/UL — SIGNIFICANT CHANGE UP (ref 3.8–10.5)
WBC # BLD: 7.49 K/UL — SIGNIFICANT CHANGE UP (ref 3.8–10.5)
WBC # BLD: 7.66 K/UL — SIGNIFICANT CHANGE UP (ref 3.8–10.5)
WBC # BLD: 7.79 K/UL — SIGNIFICANT CHANGE UP (ref 3.8–10.5)
WBC # BLD: 7.81 K/UL — SIGNIFICANT CHANGE UP (ref 3.8–10.5)
WBC # BLD: 7.86 K/UL — SIGNIFICANT CHANGE UP (ref 3.8–10.5)
WBC # BLD: 8.15 K/UL — SIGNIFICANT CHANGE UP (ref 3.8–10.5)
WBC # BLD: 8.55 K/UL — SIGNIFICANT CHANGE UP (ref 3.8–10.5)
WBC # BLD: 8.81 K/UL — SIGNIFICANT CHANGE UP (ref 3.8–10.5)
WBC # BLD: 8.81 K/UL — SIGNIFICANT CHANGE UP (ref 3.8–10.5)
WBC # BLD: 8.87 K/UL — SIGNIFICANT CHANGE UP (ref 3.8–10.5)
WBC # BLD: 9.02 K/UL — SIGNIFICANT CHANGE UP (ref 3.8–10.5)
WBC # BLD: 9.3 K/UL — SIGNIFICANT CHANGE UP (ref 3.8–10.5)
WBC # BLD: 9.62 K/UL — SIGNIFICANT CHANGE UP (ref 3.8–10.5)
WBC # BLD: 9.66 K/UL — SIGNIFICANT CHANGE UP (ref 3.8–10.5)
WBC # BLD: 9.75 K/UL — SIGNIFICANT CHANGE UP (ref 3.8–10.5)
WBC # BLD: 9.85 K/UL — SIGNIFICANT CHANGE UP (ref 3.8–10.5)
WBC # FLD AUTO: 10.18 K/UL — SIGNIFICANT CHANGE UP (ref 3.8–10.5)
WBC # FLD AUTO: 10.2 K/UL — SIGNIFICANT CHANGE UP (ref 3.8–10.5)
WBC # FLD AUTO: 10.27 K/UL — SIGNIFICANT CHANGE UP (ref 3.8–10.5)
WBC # FLD AUTO: 10.53 K/UL — HIGH (ref 3.8–10.5)
WBC # FLD AUTO: 10.6 K/UL — HIGH (ref 3.8–10.5)
WBC # FLD AUTO: 10.76 K/UL — HIGH (ref 3.8–10.5)
WBC # FLD AUTO: 10.82 K/UL — HIGH (ref 3.8–10.5)
WBC # FLD AUTO: 11.16 K/UL — HIGH (ref 3.8–10.5)
WBC # FLD AUTO: 11.24 K/UL — HIGH (ref 3.8–10.5)
WBC # FLD AUTO: 11.4 K/UL — HIGH (ref 3.8–10.5)
WBC # FLD AUTO: 11.58 K/UL — HIGH (ref 3.8–10.5)
WBC # FLD AUTO: 11.86 K/UL — HIGH (ref 3.8–10.5)
WBC # FLD AUTO: 12.4 K/UL — HIGH (ref 3.8–10.5)
WBC # FLD AUTO: 12.46 K/UL — HIGH (ref 3.8–10.5)
WBC # FLD AUTO: 12.56 K/UL — HIGH (ref 3.8–10.5)
WBC # FLD AUTO: 12.62 K/UL — HIGH (ref 3.8–10.5)
WBC # FLD AUTO: 12.72 K/UL — HIGH (ref 3.8–10.5)
WBC # FLD AUTO: 13.09 K/UL — HIGH (ref 3.8–10.5)
WBC # FLD AUTO: 13.3 K/UL — HIGH (ref 3.8–10.5)
WBC # FLD AUTO: 13.47 K/UL — HIGH (ref 3.8–10.5)
WBC # FLD AUTO: 13.49 K/UL — HIGH (ref 3.8–10.5)
WBC # FLD AUTO: 13.54 K/UL — HIGH (ref 3.8–10.5)
WBC # FLD AUTO: 13.74 K/UL — HIGH (ref 3.8–10.5)
WBC # FLD AUTO: 14.28 K/UL — HIGH (ref 3.8–10.5)
WBC # FLD AUTO: 14.29 K/UL — HIGH (ref 3.8–10.5)
WBC # FLD AUTO: 14.43 K/UL — HIGH (ref 3.8–10.5)
WBC # FLD AUTO: 14.5 K/UL — HIGH (ref 3.8–10.5)
WBC # FLD AUTO: 14.56 K/UL — HIGH (ref 3.8–10.5)
WBC # FLD AUTO: 14.57 K/UL — HIGH (ref 3.8–10.5)
WBC # FLD AUTO: 15.34 K/UL — HIGH (ref 3.8–10.5)
WBC # FLD AUTO: 15.39 K/UL — HIGH (ref 3.8–10.5)
WBC # FLD AUTO: 15.8 K/UL — HIGH (ref 3.8–10.5)
WBC # FLD AUTO: 16.3 K/UL — HIGH (ref 3.8–10.5)
WBC # FLD AUTO: 16.6 K/UL — HIGH (ref 3.8–10.5)
WBC # FLD AUTO: 16.79 K/UL — HIGH (ref 3.8–10.5)
WBC # FLD AUTO: 17.47 K/UL — HIGH (ref 3.8–10.5)
WBC # FLD AUTO: 20.49 K/UL — HIGH (ref 3.8–10.5)
WBC # FLD AUTO: 21.24 K/UL — HIGH (ref 3.8–10.5)
WBC # FLD AUTO: 21.54 K/UL — HIGH (ref 3.8–10.5)
WBC # FLD AUTO: 21.6 K/UL — HIGH (ref 3.8–10.5)
WBC # FLD AUTO: 21.65 K/UL — HIGH (ref 3.8–10.5)
WBC # FLD AUTO: 22.53 K/UL — HIGH (ref 3.8–10.5)
WBC # FLD AUTO: 22.54 K/UL — HIGH (ref 3.8–10.5)
WBC # FLD AUTO: 23.62 K/UL — HIGH (ref 3.8–10.5)
WBC # FLD AUTO: 23.79 K/UL — HIGH (ref 3.8–10.5)
WBC # FLD AUTO: 24.36 K/UL — HIGH (ref 3.8–10.5)
WBC # FLD AUTO: 26.66 K/UL — HIGH (ref 3.8–10.5)
WBC # FLD AUTO: 26.98 K/UL — HIGH (ref 3.8–10.5)
WBC # FLD AUTO: 5.03 K/UL — SIGNIFICANT CHANGE UP (ref 3.8–10.5)
WBC # FLD AUTO: 5.47 K/UL — SIGNIFICANT CHANGE UP (ref 3.8–10.5)
WBC # FLD AUTO: 5.59 K/UL — SIGNIFICANT CHANGE UP (ref 3.8–10.5)
WBC # FLD AUTO: 6.18 K/UL — SIGNIFICANT CHANGE UP (ref 3.8–10.5)
WBC # FLD AUTO: 6.18 K/UL — SIGNIFICANT CHANGE UP (ref 3.8–10.5)
WBC # FLD AUTO: 6.2 K/UL — SIGNIFICANT CHANGE UP (ref 3.8–10.5)
WBC # FLD AUTO: 6.24 K/UL — SIGNIFICANT CHANGE UP (ref 3.8–10.5)
WBC # FLD AUTO: 6.35 K/UL — SIGNIFICANT CHANGE UP (ref 3.8–10.5)
WBC # FLD AUTO: 6.42 K/UL — SIGNIFICANT CHANGE UP (ref 3.8–10.5)
WBC # FLD AUTO: 6.74 K/UL — SIGNIFICANT CHANGE UP (ref 3.8–10.5)
WBC # FLD AUTO: 7.01 K/UL — SIGNIFICANT CHANGE UP (ref 3.8–10.5)
WBC # FLD AUTO: 7.04 K/UL — SIGNIFICANT CHANGE UP (ref 3.8–10.5)
WBC # FLD AUTO: 7.27 K/UL — SIGNIFICANT CHANGE UP (ref 3.8–10.5)
WBC # FLD AUTO: 7.33 K/UL — SIGNIFICANT CHANGE UP (ref 3.8–10.5)
WBC # FLD AUTO: 7.43 K/UL — SIGNIFICANT CHANGE UP (ref 3.8–10.5)
WBC # FLD AUTO: 7.49 K/UL — SIGNIFICANT CHANGE UP (ref 3.8–10.5)
WBC # FLD AUTO: 7.66 K/UL — SIGNIFICANT CHANGE UP (ref 3.8–10.5)
WBC # FLD AUTO: 7.79 K/UL — SIGNIFICANT CHANGE UP (ref 3.8–10.5)
WBC # FLD AUTO: 7.81 K/UL — SIGNIFICANT CHANGE UP (ref 3.8–10.5)
WBC # FLD AUTO: 7.86 K/UL — SIGNIFICANT CHANGE UP (ref 3.8–10.5)
WBC # FLD AUTO: 8.15 K/UL — SIGNIFICANT CHANGE UP (ref 3.8–10.5)
WBC # FLD AUTO: 8.55 K/UL — SIGNIFICANT CHANGE UP (ref 3.8–10.5)
WBC # FLD AUTO: 8.81 K/UL — SIGNIFICANT CHANGE UP (ref 3.8–10.5)
WBC # FLD AUTO: 8.81 K/UL — SIGNIFICANT CHANGE UP (ref 3.8–10.5)
WBC # FLD AUTO: 8.87 K/UL — SIGNIFICANT CHANGE UP (ref 3.8–10.5)
WBC # FLD AUTO: 9.02 K/UL — SIGNIFICANT CHANGE UP (ref 3.8–10.5)
WBC # FLD AUTO: 9.3 K/UL — SIGNIFICANT CHANGE UP (ref 3.8–10.5)
WBC # FLD AUTO: 9.62 K/UL — SIGNIFICANT CHANGE UP (ref 3.8–10.5)
WBC # FLD AUTO: 9.66 K/UL — SIGNIFICANT CHANGE UP (ref 3.8–10.5)
WBC # FLD AUTO: 9.75 K/UL — SIGNIFICANT CHANGE UP (ref 3.8–10.5)
WBC # FLD AUTO: 9.85 K/UL — SIGNIFICANT CHANGE UP (ref 3.8–10.5)
WBC UR QL: 11 /HPF — HIGH (ref 0–5)
WBC UR QL: 22 /HPF — HIGH (ref 0–5)

## 2022-01-01 PROCEDURE — 73560 X-RAY EXAM OF KNEE 1 OR 2: CPT | Mod: 26,50

## 2022-01-01 PROCEDURE — 93750 INTERROGATION VAD IN PERSON: CPT

## 2022-01-01 PROCEDURE — 83735 ASSAY OF MAGNESIUM: CPT

## 2022-01-01 PROCEDURE — 86923 COMPATIBILITY TEST ELECTRIC: CPT

## 2022-01-01 PROCEDURE — 83615 LACTATE (LD) (LDH) ENZYME: CPT

## 2022-01-01 PROCEDURE — 99232 SBSQ HOSP IP/OBS MODERATE 35: CPT

## 2022-01-01 PROCEDURE — 36430 TRANSFUSION BLD/BLD COMPNT: CPT

## 2022-01-01 PROCEDURE — 99232 SBSQ HOSP IP/OBS MODERATE 35: CPT | Mod: 25

## 2022-01-01 PROCEDURE — 93306 TTE W/DOPPLER COMPLETE: CPT

## 2022-01-01 PROCEDURE — 87640 STAPH A DNA AMP PROBE: CPT

## 2022-01-01 PROCEDURE — 11721 DEBRIDE NAIL 6 OR MORE: CPT

## 2022-01-01 PROCEDURE — 36600 WITHDRAWAL OF ARTERIAL BLOOD: CPT

## 2022-01-01 PROCEDURE — 99233 SBSQ HOSP IP/OBS HIGH 50: CPT

## 2022-01-01 PROCEDURE — 86850 RBC ANTIBODY SCREEN: CPT

## 2022-01-01 PROCEDURE — 71260 CT THORAX DX C+: CPT

## 2022-01-01 PROCEDURE — 84156 ASSAY OF PROTEIN URINE: CPT

## 2022-01-01 PROCEDURE — 84145 PROCALCITONIN (PCT): CPT

## 2022-01-01 PROCEDURE — 71045 X-RAY EXAM CHEST 1 VIEW: CPT | Mod: 26

## 2022-01-01 PROCEDURE — 99291 CRITICAL CARE FIRST HOUR: CPT

## 2022-01-01 PROCEDURE — 83605 ASSAY OF LACTIC ACID: CPT

## 2022-01-01 PROCEDURE — 83935 ASSAY OF URINE OSMOLALITY: CPT

## 2022-01-01 PROCEDURE — 84436 ASSAY OF TOTAL THYROXINE: CPT

## 2022-01-01 PROCEDURE — 82962 GLUCOSE BLOOD TEST: CPT

## 2022-01-01 PROCEDURE — 70450 CT HEAD/BRAIN W/O DYE: CPT | Mod: 26

## 2022-01-01 PROCEDURE — 85730 THROMBOPLASTIN TIME PARTIAL: CPT

## 2022-01-01 PROCEDURE — 83930 ASSAY OF BLOOD OSMOLALITY: CPT

## 2022-01-01 PROCEDURE — 74230 X-RAY XM SWLNG FUNCJ C+: CPT

## 2022-01-01 PROCEDURE — 82340 ASSAY OF CALCIUM IN URINE: CPT

## 2022-01-01 PROCEDURE — 99232 SBSQ HOSP IP/OBS MODERATE 35: CPT | Mod: GC

## 2022-01-01 PROCEDURE — 99231 SBSQ HOSP IP/OBS SF/LOW 25: CPT

## 2022-01-01 PROCEDURE — 84133 ASSAY OF URINE POTASSIUM: CPT

## 2022-01-01 PROCEDURE — 71260 CT THORAX DX C+: CPT | Mod: 26

## 2022-01-01 PROCEDURE — 93306 TTE W/DOPPLER COMPLETE: CPT | Mod: 26

## 2022-01-01 PROCEDURE — 82947 ASSAY GLUCOSE BLOOD QUANT: CPT

## 2022-01-01 PROCEDURE — 73521 X-RAY EXAM HIPS BI 2 VIEWS: CPT

## 2022-01-01 PROCEDURE — 82570 ASSAY OF URINE CREATININE: CPT

## 2022-01-01 PROCEDURE — 36556 INSERT NON-TUNNEL CV CATH: CPT

## 2022-01-01 PROCEDURE — 36620 INSERTION CATHETER ARTERY: CPT

## 2022-01-01 PROCEDURE — 85384 FIBRINOGEN ACTIVITY: CPT

## 2022-01-01 PROCEDURE — 71045 X-RAY EXAM CHEST 1 VIEW: CPT | Mod: 26,77

## 2022-01-01 PROCEDURE — 99292 CRITICAL CARE ADDL 30 MIN: CPT | Mod: 25

## 2022-01-01 PROCEDURE — 83010 ASSAY OF HAPTOGLOBIN QUANT: CPT

## 2022-01-01 PROCEDURE — 84134 ASSAY OF PREALBUMIN: CPT

## 2022-01-01 PROCEDURE — 82533 TOTAL CORTISOL: CPT

## 2022-01-01 PROCEDURE — 74230 X-RAY XM SWLNG FUNCJ C+: CPT | Mod: 26

## 2022-01-01 PROCEDURE — 76705 ECHO EXAM OF ABDOMEN: CPT

## 2022-01-01 PROCEDURE — 99358 PROLONG SERVICE W/O CONTACT: CPT

## 2022-01-01 PROCEDURE — 82565 ASSAY OF CREATININE: CPT

## 2022-01-01 PROCEDURE — 99292 CRITICAL CARE ADDL 30 MIN: CPT

## 2022-01-01 PROCEDURE — 82803 BLOOD GASES ANY COMBINATION: CPT

## 2022-01-01 PROCEDURE — 99497 ADVNCD CARE PLAN 30 MIN: CPT

## 2022-01-01 PROCEDURE — 94660 CPAP INITIATION&MGMT: CPT

## 2022-01-01 PROCEDURE — 86769 SARS-COV-2 COVID-19 ANTIBODY: CPT

## 2022-01-01 PROCEDURE — 80202 ASSAY OF VANCOMYCIN: CPT

## 2022-01-01 PROCEDURE — 99498 ADVNCD CARE PLAN ADDL 30 MIN: CPT

## 2022-01-01 PROCEDURE — 70450 CT HEAD/BRAIN W/O DYE: CPT

## 2022-01-01 PROCEDURE — 73521 X-RAY EXAM HIPS BI 2 VIEWS: CPT | Mod: 26

## 2022-01-01 PROCEDURE — U0003: CPT

## 2022-01-01 PROCEDURE — 82330 ASSAY OF CALCIUM: CPT

## 2022-01-01 PROCEDURE — 84132 ASSAY OF SERUM POTASSIUM: CPT

## 2022-01-01 PROCEDURE — 92610 EVALUATE SWALLOWING FUNCTION: CPT

## 2022-01-01 PROCEDURE — 71045 X-RAY EXAM CHEST 1 VIEW: CPT

## 2022-01-01 PROCEDURE — P9011: CPT

## 2022-01-01 PROCEDURE — 86140 C-REACTIVE PROTEIN: CPT

## 2022-01-01 PROCEDURE — 85014 HEMATOCRIT: CPT

## 2022-01-01 PROCEDURE — 74177 CT ABD & PELVIS W/CONTRAST: CPT | Mod: 26

## 2022-01-01 PROCEDURE — 74018 RADEX ABDOMEN 1 VIEW: CPT

## 2022-01-01 PROCEDURE — 99291 CRITICAL CARE FIRST HOUR: CPT | Mod: 25

## 2022-01-01 PROCEDURE — 90791 PSYCH DIAGNOSTIC EVALUATION: CPT

## 2022-01-01 PROCEDURE — 82247 BILIRUBIN TOTAL: CPT

## 2022-01-01 PROCEDURE — 99497 ADVNCD CARE PLAN 30 MIN: CPT | Mod: 25

## 2022-01-01 PROCEDURE — 99223 1ST HOSP IP/OBS HIGH 75: CPT

## 2022-01-01 PROCEDURE — 82550 ASSAY OF CK (CPK): CPT

## 2022-01-01 PROCEDURE — 99223 1ST HOSP IP/OBS HIGH 75: CPT | Mod: GC

## 2022-01-01 PROCEDURE — 97162 PT EVAL MOD COMPLEX 30 MIN: CPT

## 2022-01-01 PROCEDURE — 81001 URINALYSIS AUTO W/SCOPE: CPT

## 2022-01-01 PROCEDURE — 90792 PSYCH DIAG EVAL W/MED SRVCS: CPT

## 2022-01-01 PROCEDURE — 83540 ASSAY OF IRON: CPT

## 2022-01-01 PROCEDURE — 87040 BLOOD CULTURE FOR BACTERIA: CPT

## 2022-01-01 PROCEDURE — 86900 BLOOD TYPING SEROLOGIC ABO: CPT

## 2022-01-01 PROCEDURE — 93010 ELECTROCARDIOGRAM REPORT: CPT

## 2022-01-01 PROCEDURE — 99221 1ST HOSP IP/OBS SF/LOW 40: CPT | Mod: 25

## 2022-01-01 PROCEDURE — 80053 COMPREHEN METABOLIC PANEL: CPT

## 2022-01-01 PROCEDURE — 87186 SC STD MICRODIL/AGAR DIL: CPT

## 2022-01-01 PROCEDURE — 0225U NFCT DS DNA&RNA 21 SARSCOV2: CPT

## 2022-01-01 PROCEDURE — 85025 COMPLETE CBC W/AUTO DIFF WBC: CPT

## 2022-01-01 PROCEDURE — 92526 ORAL FUNCTION THERAPY: CPT

## 2022-01-01 PROCEDURE — 99233 SBSQ HOSP IP/OBS HIGH 50: CPT | Mod: GC

## 2022-01-01 PROCEDURE — 85652 RBC SED RATE AUTOMATED: CPT

## 2022-01-01 PROCEDURE — 86901 BLOOD TYPING SEROLOGIC RH(D): CPT

## 2022-01-01 PROCEDURE — 83550 IRON BINDING TEST: CPT

## 2022-01-01 PROCEDURE — 74177 CT ABD & PELVIS W/CONTRAST: CPT

## 2022-01-01 PROCEDURE — 82607 VITAMIN B-12: CPT

## 2022-01-01 PROCEDURE — 84439 ASSAY OF FREE THYROXINE: CPT

## 2022-01-01 PROCEDURE — 87641 MR-STAPH DNA AMP PROBE: CPT

## 2022-01-01 PROCEDURE — 71045 X-RAY EXAM CHEST 1 VIEW: CPT | Mod: 26,76

## 2022-01-01 PROCEDURE — 99233 SBSQ HOSP IP/OBS HIGH 50: CPT | Mod: 25

## 2022-01-01 PROCEDURE — U0005: CPT

## 2022-01-01 PROCEDURE — 86780 TREPONEMA PALLIDUM: CPT

## 2022-01-01 PROCEDURE — 74018 RADEX ABDOMEN 1 VIEW: CPT | Mod: 26

## 2022-01-01 PROCEDURE — 85018 HEMOGLOBIN: CPT

## 2022-01-01 PROCEDURE — 84300 ASSAY OF URINE SODIUM: CPT

## 2022-01-01 PROCEDURE — 84484 ASSAY OF TROPONIN QUANT: CPT

## 2022-01-01 PROCEDURE — 84560 ASSAY OF URINE/URIC ACID: CPT

## 2022-01-01 PROCEDURE — 92611 MOTION FLUOROSCOPY/SWALLOW: CPT

## 2022-01-01 PROCEDURE — 82435 ASSAY OF BLOOD CHLORIDE: CPT

## 2022-01-01 PROCEDURE — 86985 SPLIT BLOOD OR PRODUCTS: CPT

## 2022-01-01 PROCEDURE — 82270 OCCULT BLOOD FECES: CPT

## 2022-01-01 PROCEDURE — 85027 COMPLETE CBC AUTOMATED: CPT

## 2022-01-01 PROCEDURE — 83036 HEMOGLOBIN GLYCOSYLATED A1C: CPT

## 2022-01-01 PROCEDURE — 82140 ASSAY OF AMMONIA: CPT

## 2022-01-01 PROCEDURE — 82746 ASSAY OF FOLIC ACID SERUM: CPT

## 2022-01-01 PROCEDURE — 95714 VEEG EA 12-26 HR UNMNTR: CPT

## 2022-01-01 PROCEDURE — 80048 BASIC METABOLIC PNL TOTAL CA: CPT

## 2022-01-01 PROCEDURE — 93005 ELECTROCARDIOGRAM TRACING: CPT

## 2022-01-01 PROCEDURE — 85045 AUTOMATED RETICULOCYTE COUNT: CPT

## 2022-01-01 PROCEDURE — 84480 ASSAY TRIIODOTHYRONINE (T3): CPT

## 2022-01-01 PROCEDURE — 84550 ASSAY OF BLOOD/URIC ACID: CPT

## 2022-01-01 PROCEDURE — 84295 ASSAY OF SERUM SODIUM: CPT

## 2022-01-01 PROCEDURE — 97110 THERAPEUTIC EXERCISES: CPT

## 2022-01-01 PROCEDURE — 95720 EEG PHY/QHP EA INCR W/VEEG: CPT

## 2022-01-01 PROCEDURE — 97116 GAIT TRAINING THERAPY: CPT

## 2022-01-01 PROCEDURE — 74176 CT ABD & PELVIS W/O CONTRAST: CPT

## 2022-01-01 PROCEDURE — 83880 ASSAY OF NATRIURETIC PEPTIDE: CPT

## 2022-01-01 PROCEDURE — 73560 X-RAY EXAM OF KNEE 1 OR 2: CPT

## 2022-01-01 PROCEDURE — 82306 VITAMIN D 25 HYDROXY: CPT

## 2022-01-01 PROCEDURE — 86682 HELMINTH ANTIBODY: CPT

## 2022-01-01 PROCEDURE — 82652 VIT D 1 25-DIHYDROXY: CPT

## 2022-01-01 PROCEDURE — 99222 1ST HOSP IP/OBS MODERATE 55: CPT

## 2022-01-01 PROCEDURE — 87449 NOS EACH ORGANISM AG IA: CPT

## 2022-01-01 PROCEDURE — 82436 ASSAY OF URINE CHLORIDE: CPT

## 2022-01-01 PROCEDURE — 87150 DNA/RNA AMPLIFIED PROBE: CPT

## 2022-01-01 PROCEDURE — 85379 FIBRIN DEGRADATION QUANT: CPT

## 2022-01-01 PROCEDURE — 82728 ASSAY OF FERRITIN: CPT

## 2022-01-01 PROCEDURE — 87077 CULTURE AEROBIC IDENTIFY: CPT

## 2022-01-01 PROCEDURE — 90832 PSYTX W PT 30 MINUTES: CPT

## 2022-01-01 PROCEDURE — 74176 CT ABD & PELVIS W/O CONTRAST: CPT | Mod: 26

## 2022-01-01 PROCEDURE — 71250 CT THORAX DX C-: CPT | Mod: 26

## 2022-01-01 PROCEDURE — 84443 ASSAY THYROID STIM HORMONE: CPT

## 2022-01-01 PROCEDURE — 99239 HOSP IP/OBS DSCHRG MGMT >30: CPT

## 2022-01-01 PROCEDURE — 87070 CULTURE OTHR SPECIMN AEROBIC: CPT

## 2022-01-01 PROCEDURE — 99498 ADVNCD CARE PLAN ADDL 30 MIN: CPT | Mod: 25

## 2022-01-01 PROCEDURE — 84100 ASSAY OF PHOSPHORUS: CPT

## 2022-01-01 PROCEDURE — 93285 PRGRMG DEV EVAL SCRMS IP: CPT | Mod: 26

## 2022-01-01 PROCEDURE — P9045: CPT

## 2022-01-01 PROCEDURE — 36415 COLL VENOUS BLD VENIPUNCTURE: CPT

## 2022-01-01 PROCEDURE — 93010 ELECTROCARDIOGRAM REPORT: CPT | Mod: 76

## 2022-01-01 PROCEDURE — 80076 HEPATIC FUNCTION PANEL: CPT

## 2022-01-01 PROCEDURE — P9016: CPT

## 2022-01-01 PROCEDURE — 95700 EEG CONT REC W/VID EEG TECH: CPT

## 2022-01-01 PROCEDURE — 82553 CREATINE MB FRACTION: CPT

## 2022-01-01 PROCEDURE — 71250 CT THORAX DX C-: CPT

## 2022-01-01 PROCEDURE — 85610 PROTHROMBIN TIME: CPT

## 2022-01-01 PROCEDURE — 82248 BILIRUBIN DIRECT: CPT

## 2022-01-01 PROCEDURE — 84425 ASSAY OF VITAMIN B-1: CPT

## 2022-01-01 PROCEDURE — 31615 TRCHEOBRNCHSC EST TRACHS INC: CPT

## 2022-01-01 PROCEDURE — 84481 FREE ASSAY (FT-3): CPT

## 2022-01-01 PROCEDURE — 97530 THERAPEUTIC ACTIVITIES: CPT

## 2022-01-01 RX ORDER — VANCOMYCIN HCL 1 G
500 VIAL (EA) INTRAVENOUS EVERY 12 HOURS
Refills: 0 | Status: DISCONTINUED | OUTPATIENT
Start: 2022-01-01 | End: 2022-01-01

## 2022-01-01 RX ORDER — METOCLOPRAMIDE HCL 10 MG
1 TABLET ORAL
Qty: 0 | Refills: 0 | DISCHARGE
Start: 2022-01-01

## 2022-01-01 RX ORDER — ALBUMIN HUMAN 25 %
250 VIAL (ML) INTRAVENOUS ONCE
Refills: 0 | Status: COMPLETED | OUTPATIENT
Start: 2022-01-01 | End: 2022-01-01

## 2022-01-01 RX ORDER — SODIUM CHLORIDE 9 MG/ML
250 INJECTION INTRAMUSCULAR; INTRAVENOUS; SUBCUTANEOUS ONCE
Refills: 0 | Status: DISCONTINUED | OUTPATIENT
Start: 2022-01-01 | End: 2022-01-01

## 2022-01-01 RX ORDER — SPIRONOLACTONE 25 MG/1
1 TABLET, FILM COATED ORAL
Qty: 0 | Refills: 0 | DISCHARGE

## 2022-01-01 RX ORDER — PIPERACILLIN AND TAZOBACTAM 4; .5 G/20ML; G/20ML
3.38 INJECTION, POWDER, LYOPHILIZED, FOR SOLUTION INTRAVENOUS EVERY 8 HOURS
Refills: 0 | Status: DISCONTINUED | OUTPATIENT
Start: 2022-01-01 | End: 2022-01-01

## 2022-01-01 RX ORDER — PANTOPRAZOLE SODIUM 20 MG/1
40 TABLET, DELAYED RELEASE ORAL EVERY 12 HOURS
Refills: 0 | Status: DISCONTINUED | OUTPATIENT
Start: 2022-01-01 | End: 2022-01-01

## 2022-01-01 RX ORDER — SENNA PLUS 8.6 MG/1
2 TABLET ORAL AT BEDTIME
Refills: 0 | Status: DISCONTINUED | OUTPATIENT
Start: 2022-01-01 | End: 2022-01-01

## 2022-01-01 RX ORDER — SODIUM CHLORIDE 9 MG/ML
1 INJECTION INTRAMUSCULAR; INTRAVENOUS; SUBCUTANEOUS EVERY 8 HOURS
Refills: 0 | Status: DISCONTINUED | OUTPATIENT
Start: 2022-01-01 | End: 2022-01-01

## 2022-01-01 RX ORDER — MAGNESIUM SULFATE 500 MG/ML
2 VIAL (ML) INJECTION ONCE
Refills: 0 | Status: DISCONTINUED | OUTPATIENT
Start: 2022-01-01 | End: 2022-01-01

## 2022-01-01 RX ORDER — MAGNESIUM SULFATE 500 MG/ML
1 VIAL (ML) INJECTION ONCE
Refills: 0 | Status: COMPLETED | OUTPATIENT
Start: 2022-01-01 | End: 2022-01-01

## 2022-01-01 RX ORDER — SODIUM ZIRCONIUM CYCLOSILICATE 10 G/10G
10 POWDER, FOR SUSPENSION ORAL ONCE
Refills: 0 | Status: COMPLETED | OUTPATIENT
Start: 2022-01-01 | End: 2022-01-01

## 2022-01-01 RX ORDER — LIDOCAINE HCL 20 MG/ML
50 VIAL (ML) INJECTION ONCE
Refills: 0 | Status: COMPLETED | OUTPATIENT
Start: 2022-01-01 | End: 2022-01-01

## 2022-01-01 RX ORDER — ASPIRIN/CALCIUM CARB/MAGNESIUM 324 MG
1 TABLET ORAL
Qty: 0 | Refills: 0 | DISCHARGE

## 2022-01-01 RX ORDER — DRONABINOL 2.5 MG
1 CAPSULE ORAL
Qty: 0 | Refills: 0 | DISCHARGE
Start: 2022-01-01

## 2022-01-01 RX ORDER — POTASSIUM CHLORIDE 20 MEQ
20 PACKET (EA) ORAL
Refills: 0 | Status: COMPLETED | OUTPATIENT
Start: 2022-01-01 | End: 2022-01-01

## 2022-01-01 RX ORDER — ACETAMINOPHEN 500 MG
750 TABLET ORAL ONCE
Refills: 0 | Status: COMPLETED | OUTPATIENT
Start: 2022-01-01 | End: 2022-01-01

## 2022-01-01 RX ORDER — MAGNESIUM SULFATE 500 MG/ML
2 VIAL (ML) INJECTION ONCE
Refills: 0 | Status: COMPLETED | OUTPATIENT
Start: 2022-01-01 | End: 2022-01-01

## 2022-01-01 RX ORDER — MIDODRINE HYDROCHLORIDE 2.5 MG/1
10 TABLET ORAL EVERY 8 HOURS
Refills: 0 | Status: DISCONTINUED | OUTPATIENT
Start: 2022-01-01 | End: 2022-01-01

## 2022-01-01 RX ORDER — METOCLOPRAMIDE HCL 10 MG
10 TABLET ORAL
Refills: 0 | Status: DISCONTINUED | OUTPATIENT
Start: 2022-01-01 | End: 2022-01-01

## 2022-01-01 RX ORDER — METOCLOPRAMIDE HCL 10 MG
1 TABLET ORAL
Qty: 0 | Refills: 0 | DISCHARGE

## 2022-01-01 RX ORDER — DRONABINOL 2.5 MG
2.5 CAPSULE ORAL
Refills: 0 | Status: DISCONTINUED | OUTPATIENT
Start: 2022-01-01 | End: 2022-01-01

## 2022-01-01 RX ORDER — CHOLECALCIFEROL (VITAMIN D3) 125 MCG
1000 CAPSULE ORAL DAILY
Refills: 0 | Status: DISCONTINUED | OUTPATIENT
Start: 2022-01-01 | End: 2022-01-01

## 2022-01-01 RX ORDER — MEXILETINE HYDROCHLORIDE 150 MG/1
150 CAPSULE ORAL EVERY 8 HOURS
Refills: 0 | Status: DISCONTINUED | OUTPATIENT
Start: 2022-01-01 | End: 2022-01-01

## 2022-01-01 RX ORDER — IBUPROFEN 200 MG
400 TABLET ORAL EVERY 6 HOURS
Refills: 0 | Status: DISCONTINUED | OUTPATIENT
Start: 2022-01-01 | End: 2022-01-01

## 2022-01-01 RX ORDER — METOPROLOL TARTRATE 50 MG
25 TABLET ORAL DAILY
Refills: 0 | Status: DISCONTINUED | OUTPATIENT
Start: 2022-01-01 | End: 2022-01-01

## 2022-01-01 RX ORDER — SERTRALINE 25 MG/1
100 TABLET, FILM COATED ORAL DAILY
Refills: 0 | Status: DISCONTINUED | OUTPATIENT
Start: 2022-01-01 | End: 2022-01-01

## 2022-01-01 RX ORDER — POLYETHYLENE GLYCOL 3350 17 G/17G
17 POWDER, FOR SOLUTION ORAL DAILY
Refills: 0 | Status: DISCONTINUED | OUTPATIENT
Start: 2022-01-01 | End: 2022-01-01

## 2022-01-01 RX ORDER — GLUCAGON INJECTION, SOLUTION 0.5 MG/.1ML
1 INJECTION, SOLUTION SUBCUTANEOUS ONCE
Refills: 0 | Status: DISCONTINUED | OUTPATIENT
Start: 2022-01-01 | End: 2022-01-01

## 2022-01-01 RX ORDER — METOPROLOL TARTRATE 50 MG
50 TABLET ORAL THREE TIMES A DAY
Refills: 0 | Status: DISCONTINUED | OUTPATIENT
Start: 2022-01-01 | End: 2022-01-01

## 2022-01-01 RX ORDER — DRONABINOL 2.5 MG
2.5 CAPSULE ORAL AT BEDTIME
Refills: 0 | Status: DISCONTINUED | OUTPATIENT
Start: 2022-01-01 | End: 2022-01-01

## 2022-01-01 RX ORDER — SODIUM CHLORIDE 9 MG/ML
500 INJECTION, SOLUTION INTRAVENOUS ONCE
Refills: 0 | Status: COMPLETED | OUTPATIENT
Start: 2022-01-01 | End: 2022-01-01

## 2022-01-01 RX ORDER — ACETAMINOPHEN 500 MG
975 TABLET ORAL ONCE
Refills: 0 | Status: COMPLETED | OUTPATIENT
Start: 2022-01-01 | End: 2022-01-01

## 2022-01-01 RX ORDER — VASOPRESSIN 20 [USP'U]/ML
0.06 INJECTION INTRAVENOUS
Qty: 50 | Refills: 0 | Status: DISCONTINUED | OUTPATIENT
Start: 2022-01-01 | End: 2022-01-01

## 2022-01-01 RX ORDER — METOPROLOL TARTRATE 50 MG
25 TABLET ORAL ONCE
Refills: 0 | Status: COMPLETED | OUTPATIENT
Start: 2022-01-01 | End: 2022-01-01

## 2022-01-01 RX ORDER — PANTOPRAZOLE SODIUM 20 MG/1
40 TABLET, DELAYED RELEASE ORAL DAILY
Refills: 0 | Status: DISCONTINUED | OUTPATIENT
Start: 2022-01-01 | End: 2022-01-01

## 2022-01-01 RX ORDER — PANTOPRAZOLE SODIUM 20 MG/1
40 TABLET, DELAYED RELEASE ORAL
Refills: 0 | Status: DISCONTINUED | OUTPATIENT
Start: 2022-01-01 | End: 2022-01-01

## 2022-01-01 RX ORDER — METOPROLOL TARTRATE 50 MG
50 TABLET ORAL AT BEDTIME
Refills: 0 | Status: DISCONTINUED | OUTPATIENT
Start: 2022-01-01 | End: 2022-01-01

## 2022-01-01 RX ORDER — HYDRALAZINE HCL 50 MG
1 TABLET ORAL
Qty: 0 | Refills: 0 | DISCHARGE

## 2022-01-01 RX ORDER — NOREPINEPHRINE BITARTRATE/D5W 8 MG/250ML
0.05 PLASTIC BAG, INJECTION (ML) INTRAVENOUS
Qty: 16 | Refills: 0 | Status: DISCONTINUED | OUTPATIENT
Start: 2022-01-01 | End: 2022-01-01

## 2022-01-01 RX ORDER — SODIUM CHLORIDE 9 MG/ML
1 INJECTION INTRAMUSCULAR; INTRAVENOUS; SUBCUTANEOUS THREE TIMES A DAY
Refills: 0 | Status: DISCONTINUED | OUTPATIENT
Start: 2022-01-01 | End: 2022-01-01

## 2022-01-01 RX ORDER — CHOLECALCIFEROL (VITAMIN D3) 125 MCG
1000 CAPSULE ORAL
Qty: 0 | Refills: 0 | DISCHARGE
Start: 2022-01-01

## 2022-01-01 RX ORDER — ACETAMINOPHEN 500 MG
2 TABLET ORAL
Qty: 0 | Refills: 0 | DISCHARGE

## 2022-01-01 RX ORDER — METOPROLOL TARTRATE 50 MG
50 TABLET ORAL
Refills: 0 | Status: DISCONTINUED | OUTPATIENT
Start: 2022-01-01 | End: 2022-01-01

## 2022-01-01 RX ORDER — MEROPENEM 1 G/30ML
1000 INJECTION INTRAVENOUS EVERY 12 HOURS
Refills: 0 | Status: DISCONTINUED | OUTPATIENT
Start: 2022-01-01 | End: 2022-01-01

## 2022-01-01 RX ORDER — VANCOMYCIN HCL 1 G
750 VIAL (EA) INTRAVENOUS EVERY 24 HOURS
Refills: 0 | Status: DISCONTINUED | OUTPATIENT
Start: 2022-01-01 | End: 2022-01-01

## 2022-01-01 RX ORDER — CHLORHEXIDINE GLUCONATE 213 G/1000ML
1 SOLUTION TOPICAL
Refills: 0 | Status: DISCONTINUED | OUTPATIENT
Start: 2022-01-01 | End: 2022-01-01

## 2022-01-01 RX ORDER — METOCLOPRAMIDE HCL 10 MG
10 TABLET ORAL EVERY 8 HOURS
Refills: 0 | Status: DISCONTINUED | OUTPATIENT
Start: 2022-01-01 | End: 2022-01-01

## 2022-01-01 RX ORDER — BACLOFEN 100 %
1 POWDER (GRAM) MISCELLANEOUS
Qty: 0 | Refills: 0 | DISCHARGE
Start: 2022-01-01

## 2022-01-01 RX ORDER — CHOLECALCIFEROL (VITAMIN D3) 125 MCG
2000 CAPSULE ORAL DAILY
Refills: 0 | Status: DISCONTINUED | OUTPATIENT
Start: 2022-01-01 | End: 2022-01-01

## 2022-01-01 RX ORDER — METOPROLOL TARTRATE 50 MG
25 TABLET ORAL
Refills: 0 | Status: DISCONTINUED | OUTPATIENT
Start: 2022-01-01 | End: 2022-01-01

## 2022-01-01 RX ORDER — LIDOCAINE HCL 20 MG/ML
100 VIAL (ML) INJECTION ONCE
Refills: 0 | Status: COMPLETED | OUTPATIENT
Start: 2022-01-01 | End: 2022-01-01

## 2022-01-01 RX ORDER — VASOPRESSIN 20 [USP'U]/ML
0.04 INJECTION INTRAVENOUS
Qty: 50 | Refills: 0 | Status: DISCONTINUED | OUTPATIENT
Start: 2022-01-01 | End: 2022-01-01

## 2022-01-01 RX ORDER — LANOLIN ALCOHOL/MO/W.PET/CERES
1 CREAM (GRAM) TOPICAL
Qty: 0 | Refills: 0 | DISCHARGE
Start: 2022-01-01

## 2022-01-01 RX ORDER — CEFEPIME 1 G/1
2000 INJECTION, POWDER, FOR SOLUTION INTRAMUSCULAR; INTRAVENOUS EVERY 12 HOURS
Refills: 0 | Status: DISCONTINUED | OUTPATIENT
Start: 2022-01-01 | End: 2022-01-01

## 2022-01-01 RX ORDER — MIDODRINE HYDROCHLORIDE 2.5 MG/1
20 TABLET ORAL EVERY 8 HOURS
Refills: 0 | Status: DISCONTINUED | OUTPATIENT
Start: 2022-01-01 | End: 2022-01-01

## 2022-01-01 RX ORDER — SODIUM CHLORIDE 9 MG/ML
500 INJECTION INTRAMUSCULAR; INTRAVENOUS; SUBCUTANEOUS ONCE
Refills: 0 | Status: COMPLETED | OUTPATIENT
Start: 2022-01-01 | End: 2022-01-01

## 2022-01-01 RX ORDER — ACETAMINOPHEN 500 MG
2 TABLET ORAL
Qty: 0 | Refills: 0 | DISCHARGE
Start: 2022-01-01

## 2022-01-01 RX ORDER — DRONABINOL 2.5 MG
5 CAPSULE ORAL
Refills: 0 | Status: DISCONTINUED | OUTPATIENT
Start: 2022-01-01 | End: 2022-01-01

## 2022-01-01 RX ORDER — PREGABALIN 225 MG/1
1 CAPSULE ORAL
Qty: 0 | Refills: 0 | DISCHARGE

## 2022-01-01 RX ORDER — HYDROMORPHONE HYDROCHLORIDE 2 MG/ML
0.2 INJECTION INTRAMUSCULAR; INTRAVENOUS; SUBCUTANEOUS
Refills: 0 | Status: DISCONTINUED | OUTPATIENT
Start: 2022-01-01 | End: 2022-01-01

## 2022-01-01 RX ORDER — LIDOCAINE HCL 20 MG/ML
0.5 VIAL (ML) INJECTION
Qty: 2 | Refills: 0 | Status: DISCONTINUED | OUTPATIENT
Start: 2022-01-01 | End: 2022-01-01

## 2022-01-01 RX ORDER — INSULIN LISPRO 100/ML
VIAL (ML) SUBCUTANEOUS EVERY 6 HOURS
Refills: 0 | Status: DISCONTINUED | OUTPATIENT
Start: 2022-01-01 | End: 2022-01-01

## 2022-01-01 RX ORDER — CEFEPIME 1 G/1
1000 INJECTION, POWDER, FOR SOLUTION INTRAMUSCULAR; INTRAVENOUS EVERY 12 HOURS
Refills: 0 | Status: DISCONTINUED | OUTPATIENT
Start: 2022-01-01 | End: 2022-01-01

## 2022-01-01 RX ORDER — MEROPENEM 1 G/30ML
INJECTION INTRAVENOUS
Refills: 0 | Status: DISCONTINUED | OUTPATIENT
Start: 2022-01-01 | End: 2022-01-01

## 2022-01-01 RX ORDER — MEROPENEM 1 G/30ML
1000 INJECTION INTRAVENOUS EVERY 8 HOURS
Refills: 0 | Status: DISCONTINUED | OUTPATIENT
Start: 2022-01-01 | End: 2022-01-01

## 2022-01-01 RX ORDER — POTASSIUM CHLORIDE 20 MEQ
20 PACKET (EA) ORAL ONCE
Refills: 0 | Status: COMPLETED | OUTPATIENT
Start: 2022-01-01 | End: 2022-01-01

## 2022-01-01 RX ORDER — POLYETHYLENE GLYCOL 3350 17 G/17G
0 POWDER, FOR SOLUTION ORAL
Qty: 0 | Refills: 0 | DISCHARGE

## 2022-01-01 RX ORDER — PIPERACILLIN AND TAZOBACTAM 4; .5 G/20ML; G/20ML
3.38 INJECTION, POWDER, LYOPHILIZED, FOR SOLUTION INTRAVENOUS ONCE
Refills: 0 | Status: COMPLETED | OUTPATIENT
Start: 2022-01-01 | End: 2022-01-01

## 2022-01-01 RX ORDER — ASPIRIN/CALCIUM CARB/MAGNESIUM 324 MG
1 TABLET ORAL
Qty: 30 | Refills: 0
Start: 2022-01-01 | End: 2022-01-01

## 2022-01-01 RX ORDER — VANCOMYCIN HCL 1 G
750 VIAL (EA) INTRAVENOUS EVERY 12 HOURS
Refills: 0 | Status: DISCONTINUED | OUTPATIENT
Start: 2022-01-01 | End: 2022-01-01

## 2022-01-01 RX ORDER — METOCLOPRAMIDE HCL 10 MG
5 TABLET ORAL
Qty: 80 | Refills: 0
Start: 2022-01-01 | End: 2022-01-01

## 2022-01-01 RX ORDER — GABAPENTIN 400 MG/1
100 CAPSULE ORAL THREE TIMES A DAY
Refills: 0 | Status: DISCONTINUED | OUTPATIENT
Start: 2022-01-01 | End: 2022-01-01

## 2022-01-01 RX ORDER — PIPERACILLIN AND TAZOBACTAM 4; .5 G/20ML; G/20ML
3.38 INJECTION, POWDER, LYOPHILIZED, FOR SOLUTION INTRAVENOUS EVERY 8 HOURS
Refills: 0 | Status: COMPLETED | OUTPATIENT
Start: 2022-01-01 | End: 2022-01-01

## 2022-01-01 RX ORDER — MEROPENEM 1 G/30ML
1000 INJECTION INTRAVENOUS ONCE
Refills: 0 | Status: COMPLETED | OUTPATIENT
Start: 2022-01-01 | End: 2022-01-01

## 2022-01-01 RX ORDER — SODIUM CHLORIDE 9 MG/ML
1000 INJECTION, SOLUTION INTRAVENOUS ONCE
Refills: 0 | Status: COMPLETED | OUTPATIENT
Start: 2022-01-01 | End: 2022-01-01

## 2022-01-01 RX ORDER — DOCUSATE SODIUM 100 MG
1 CAPSULE ORAL
Qty: 0 | Refills: 0 | DISCHARGE

## 2022-01-01 RX ORDER — DEXTROSE 50 % IN WATER 50 %
50 SYRINGE (ML) INTRAVENOUS ONCE
Refills: 0 | Status: COMPLETED | OUTPATIENT
Start: 2022-01-01 | End: 2022-01-01

## 2022-01-01 RX ORDER — ACETAMINOPHEN 500 MG
650 TABLET ORAL ONCE
Refills: 0 | Status: COMPLETED | OUTPATIENT
Start: 2022-01-01 | End: 2022-01-01

## 2022-01-01 RX ORDER — GABAPENTIN 400 MG/1
125 CAPSULE ORAL THREE TIMES A DAY
Refills: 0 | Status: DISCONTINUED | OUTPATIENT
Start: 2022-01-01 | End: 2022-01-01

## 2022-01-01 RX ORDER — PANTOPRAZOLE SODIUM 20 MG/1
1 TABLET, DELAYED RELEASE ORAL
Qty: 0 | Refills: 0 | DISCHARGE

## 2022-01-01 RX ORDER — SODIUM CHLORIDE 9 MG/ML
1000 INJECTION, SOLUTION INTRAVENOUS
Refills: 0 | Status: DISCONTINUED | OUTPATIENT
Start: 2022-01-01 | End: 2022-01-01

## 2022-01-01 RX ORDER — LACTOBACILLUS ACIDOPHILUS 100MM CELL
1 CAPSULE ORAL
Qty: 0 | Refills: 0 | DISCHARGE

## 2022-01-01 RX ORDER — METHIMAZOLE 10 MG/1
1 TABLET ORAL
Qty: 0 | Refills: 0 | DISCHARGE

## 2022-01-01 RX ORDER — HYDROMORPHONE HYDROCHLORIDE 2 MG/ML
0.5 INJECTION INTRAMUSCULAR; INTRAVENOUS; SUBCUTANEOUS ONCE
Refills: 0 | Status: DISCONTINUED | OUTPATIENT
Start: 2022-01-01 | End: 2022-01-01

## 2022-01-01 RX ORDER — VASOPRESSIN 20 [USP'U]/ML
0.02 INJECTION INTRAVENOUS
Qty: 50 | Refills: 0 | Status: DISCONTINUED | OUTPATIENT
Start: 2022-01-01 | End: 2022-01-01

## 2022-01-01 RX ORDER — ERGOCALCIFEROL 1.25 MG/1
50000 CAPSULE ORAL
Refills: 0 | Status: DISCONTINUED | OUTPATIENT
Start: 2022-01-01 | End: 2022-01-01

## 2022-01-01 RX ORDER — MEXILETINE HYDROCHLORIDE 150 MG/1
1 CAPSULE ORAL
Qty: 0 | Refills: 0 | DISCHARGE
Start: 2022-01-01

## 2022-01-01 RX ORDER — INSULIN HUMAN 100 [IU]/ML
10 INJECTION, SOLUTION SUBCUTANEOUS ONCE
Refills: 0 | Status: COMPLETED | OUTPATIENT
Start: 2022-01-01 | End: 2022-01-01

## 2022-01-01 RX ORDER — FOLIC ACID 0.8 MG
1 TABLET ORAL
Qty: 0 | Refills: 0 | DISCHARGE

## 2022-01-01 RX ORDER — ALBUMIN HUMAN 25 %
250 VIAL (ML) INTRAVENOUS ONCE
Refills: 0 | Status: DISCONTINUED | OUTPATIENT
Start: 2022-01-01 | End: 2022-01-01

## 2022-01-01 RX ORDER — ASCORBIC ACID 60 MG
500 TABLET,CHEWABLE ORAL DAILY
Refills: 0 | Status: DISCONTINUED | OUTPATIENT
Start: 2022-01-01 | End: 2022-01-01

## 2022-01-01 RX ORDER — VANCOMYCIN HCL 1 G
750 VIAL (EA) INTRAVENOUS ONCE
Refills: 0 | Status: COMPLETED | OUTPATIENT
Start: 2022-01-01 | End: 2022-01-01

## 2022-01-01 RX ORDER — METOPROLOL TARTRATE 50 MG
50 TABLET ORAL DAILY
Refills: 0 | Status: DISCONTINUED | OUTPATIENT
Start: 2022-01-01 | End: 2022-01-01

## 2022-01-01 RX ORDER — CEFEPIME 1 G/1
1000 INJECTION, POWDER, FOR SOLUTION INTRAMUSCULAR; INTRAVENOUS EVERY 12 HOURS
Refills: 0 | Status: COMPLETED | OUTPATIENT
Start: 2022-01-01 | End: 2022-01-01

## 2022-01-01 RX ORDER — METOPROLOL TARTRATE 50 MG
1 TABLET ORAL
Qty: 0 | Refills: 0 | DISCHARGE
Start: 2022-01-01

## 2022-01-01 RX ORDER — CIPROFLOXACIN LACTATE 400MG/40ML
1 VIAL (ML) INTRAVENOUS
Qty: 0 | Refills: 0 | DISCHARGE

## 2022-01-01 RX ORDER — HYDROMORPHONE HYDROCHLORIDE 2 MG/ML
0.2 INJECTION INTRAMUSCULAR; INTRAVENOUS; SUBCUTANEOUS EVERY 8 HOURS
Refills: 0 | Status: DISCONTINUED | OUTPATIENT
Start: 2022-01-01 | End: 2022-01-01

## 2022-01-01 RX ORDER — SODIUM ZIRCONIUM CYCLOSILICATE 10 G/10G
10 POWDER, FOR SUSPENSION ORAL DAILY
Refills: 0 | Status: DISCONTINUED | OUTPATIENT
Start: 2022-01-01 | End: 2022-01-01

## 2022-01-01 RX ORDER — SODIUM CHLORIDE 9 MG/ML
1000 INJECTION INTRAMUSCULAR; INTRAVENOUS; SUBCUTANEOUS
Refills: 0 | Status: DISCONTINUED | OUTPATIENT
Start: 2022-01-01 | End: 2022-01-01

## 2022-01-01 RX ORDER — DEXTROSE 50 % IN WATER 50 %
25 SYRINGE (ML) INTRAVENOUS ONCE
Refills: 0 | Status: DISCONTINUED | OUTPATIENT
Start: 2022-01-01 | End: 2022-01-01

## 2022-01-01 RX ORDER — SODIUM CHLORIDE 9 MG/ML
250 INJECTION INTRAMUSCULAR; INTRAVENOUS; SUBCUTANEOUS ONCE
Refills: 0 | Status: COMPLETED | OUTPATIENT
Start: 2022-01-01 | End: 2022-01-01

## 2022-01-01 RX ORDER — TRAMADOL HYDROCHLORIDE 50 MG/1
25 TABLET ORAL ONCE
Refills: 0 | Status: DISCONTINUED | OUTPATIENT
Start: 2022-01-01 | End: 2022-01-01

## 2022-01-01 RX ORDER — TRAMADOL HYDROCHLORIDE 50 MG/1
25 TABLET ORAL EVERY 6 HOURS
Refills: 0 | Status: DISCONTINUED | OUTPATIENT
Start: 2022-01-01 | End: 2022-01-01

## 2022-01-01 RX ORDER — SENNA PLUS 8.6 MG/1
2 TABLET ORAL
Qty: 0 | Refills: 0 | DISCHARGE

## 2022-01-01 RX ORDER — POTASSIUM PHOSPHATE, MONOBASIC POTASSIUM PHOSPHATE, DIBASIC 236; 224 MG/ML; MG/ML
15 INJECTION, SOLUTION INTRAVENOUS ONCE
Refills: 0 | Status: COMPLETED | OUTPATIENT
Start: 2022-01-01 | End: 2022-01-01

## 2022-01-01 RX ORDER — VANCOMYCIN HCL 1 G
1 VIAL (EA) INTRAVENOUS
Qty: 60 | Refills: 0
Start: 2022-01-01 | End: 2022-01-01

## 2022-01-01 RX ORDER — METOPROLOL TARTRATE 50 MG
50 TABLET ORAL ONCE
Refills: 0 | Status: COMPLETED | OUTPATIENT
Start: 2022-01-01 | End: 2022-01-01

## 2022-01-01 RX ORDER — MAGNESIUM OXIDE 400 MG ORAL TABLET 241.3 MG
1 TABLET ORAL
Qty: 0 | Refills: 0 | DISCHARGE
Start: 2022-01-01

## 2022-01-01 RX ORDER — MIRTAZAPINE 45 MG/1
7.5 TABLET, ORALLY DISINTEGRATING ORAL AT BEDTIME
Refills: 0 | Status: DISCONTINUED | OUTPATIENT
Start: 2022-01-01 | End: 2022-01-01

## 2022-01-01 RX ORDER — POTASSIUM CHLORIDE 20 MEQ
40 PACKET (EA) ORAL ONCE
Refills: 0 | Status: COMPLETED | OUTPATIENT
Start: 2022-01-01 | End: 2022-01-01

## 2022-01-01 RX ORDER — THIAMINE MONONITRATE (VIT B1) 100 MG
100 TABLET ORAL DAILY
Refills: 0 | Status: DISCONTINUED | OUTPATIENT
Start: 2022-01-01 | End: 2022-01-01

## 2022-01-01 RX ORDER — VASOPRESSIN 20 [USP'U]/ML
0.1 INJECTION INTRAVENOUS
Qty: 50 | Refills: 0 | Status: DISCONTINUED | OUTPATIENT
Start: 2022-01-01 | End: 2022-01-01

## 2022-01-01 RX ORDER — SODIUM CHLORIDE 9 MG/ML
5000 INJECTION INTRAMUSCULAR; INTRAVENOUS; SUBCUTANEOUS ONCE
Refills: 0 | Status: DISCONTINUED | OUTPATIENT
Start: 2022-01-01 | End: 2022-01-01

## 2022-01-01 RX ORDER — SERTRALINE 25 MG/1
1 TABLET, FILM COATED ORAL
Qty: 0 | Refills: 0 | DISCHARGE

## 2022-01-01 RX ORDER — GABAPENTIN 400 MG/1
100 CAPSULE ORAL
Refills: 0 | Status: DISCONTINUED | OUTPATIENT
Start: 2022-01-01 | End: 2022-01-01

## 2022-01-01 RX ORDER — GABAPENTIN 400 MG/1
2.5 CAPSULE ORAL
Qty: 0 | Refills: 0 | DISCHARGE
Start: 2022-01-01

## 2022-01-01 RX ORDER — POLYETHYLENE GLYCOL 3350 17 G/17G
17 POWDER, FOR SOLUTION ORAL EVERY 12 HOURS
Refills: 0 | Status: DISCONTINUED | OUTPATIENT
Start: 2022-01-01 | End: 2022-01-01

## 2022-01-01 RX ORDER — MIRTAZAPINE 45 MG/1
1 TABLET, ORALLY DISINTEGRATING ORAL
Qty: 0 | Refills: 0 | DISCHARGE

## 2022-01-01 RX ORDER — LIDOCAINE HCL 20 MG/ML
1 VIAL (ML) INJECTION
Qty: 2 | Refills: 0 | Status: DISCONTINUED | OUTPATIENT
Start: 2022-01-01 | End: 2022-01-01

## 2022-01-01 RX ORDER — METOPROLOL TARTRATE 50 MG
1 TABLET ORAL
Qty: 0 | Refills: 0 | DISCHARGE

## 2022-01-01 RX ORDER — CHLORHEXIDINE GLUCONATE 213 G/1000ML
1 SOLUTION TOPICAL
Qty: 0 | Refills: 0 | DISCHARGE
Start: 2022-01-01

## 2022-01-01 RX ORDER — METOCLOPRAMIDE HCL 10 MG
10 TABLET ORAL THREE TIMES A DAY
Refills: 0 | Status: DISCONTINUED | OUTPATIENT
Start: 2022-01-01 | End: 2022-01-01

## 2022-01-01 RX ORDER — ACETAMINOPHEN 500 MG
600 TABLET ORAL ONCE
Refills: 0 | Status: COMPLETED | OUTPATIENT
Start: 2022-01-01 | End: 2022-01-01

## 2022-01-01 RX ORDER — DEXTROSE 50 % IN WATER 50 %
15 SYRINGE (ML) INTRAVENOUS ONCE
Refills: 0 | Status: DISCONTINUED | OUTPATIENT
Start: 2022-01-01 | End: 2022-01-01

## 2022-01-01 RX ORDER — POTASSIUM CHLORIDE 20 MEQ
20 PACKET (EA) ORAL
Refills: 0 | Status: DISCONTINUED | OUTPATIENT
Start: 2022-01-01 | End: 2022-01-01

## 2022-01-01 RX ORDER — LANOLIN ALCOHOL/MO/W.PET/CERES
3 CREAM (GRAM) TOPICAL AT BEDTIME
Refills: 0 | Status: DISCONTINUED | OUTPATIENT
Start: 2022-01-01 | End: 2022-01-01

## 2022-01-01 RX ORDER — MAGNESIUM OXIDE 400 MG ORAL TABLET 241.3 MG
1 TABLET ORAL
Qty: 0 | Refills: 0 | DISCHARGE

## 2022-01-01 RX ORDER — VANCOMYCIN HCL 1 G
VIAL (EA) INTRAVENOUS
Refills: 0 | Status: DISCONTINUED | OUTPATIENT
Start: 2022-01-01 | End: 2022-01-01

## 2022-01-01 RX ORDER — MAGNESIUM OXIDE 400 MG ORAL TABLET 241.3 MG
400 TABLET ORAL
Refills: 0 | Status: DISCONTINUED | OUTPATIENT
Start: 2022-01-01 | End: 2022-01-01

## 2022-01-01 RX ORDER — SIMETHICONE 80 MG/1
1 TABLET, CHEWABLE ORAL
Qty: 0 | Refills: 0 | DISCHARGE

## 2022-01-01 RX ORDER — FINASTERIDE 5 MG/1
1 TABLET, FILM COATED ORAL
Qty: 0 | Refills: 0 | DISCHARGE

## 2022-01-01 RX ADMIN — SODIUM CHLORIDE 50 MILLILITER(S): 9 INJECTION, SOLUTION INTRAVENOUS at 05:36

## 2022-01-01 RX ADMIN — SERTRALINE 100 MILLIGRAM(S): 25 TABLET, FILM COATED ORAL at 11:30

## 2022-01-01 RX ADMIN — Medication 10 MILLIGRAM(S): at 05:59

## 2022-01-01 RX ADMIN — Medication 1000 UNIT(S): at 11:58

## 2022-01-01 RX ADMIN — SODIUM CHLORIDE 1 GRAM(S): 9 INJECTION INTRAMUSCULAR; INTRAVENOUS; SUBCUTANEOUS at 17:07

## 2022-01-01 RX ADMIN — ENOXAPARIN SODIUM 30 MILLIGRAM(S): 100 INJECTION SUBCUTANEOUS at 13:28

## 2022-01-01 RX ADMIN — Medication 10 MILLIGRAM(S): at 21:55

## 2022-01-01 RX ADMIN — SODIUM CHLORIDE 3 MILLILITER(S): 9 INJECTION INTRAMUSCULAR; INTRAVENOUS; SUBCUTANEOUS at 20:47

## 2022-01-01 RX ADMIN — SODIUM CHLORIDE 3 MILLILITER(S): 9 INJECTION INTRAMUSCULAR; INTRAVENOUS; SUBCUTANEOUS at 21:05

## 2022-01-01 RX ADMIN — Medication 125 MILLILITER(S): at 19:02

## 2022-01-01 RX ADMIN — CEFEPIME 100 MILLIGRAM(S): 1 INJECTION, POWDER, FOR SOLUTION INTRAMUSCULAR; INTRAVENOUS at 05:53

## 2022-01-01 RX ADMIN — CEFEPIME 100 MILLIGRAM(S): 1 INJECTION, POWDER, FOR SOLUTION INTRAMUSCULAR; INTRAVENOUS at 17:15

## 2022-01-01 RX ADMIN — MIRTAZAPINE 7.5 MILLIGRAM(S): 45 TABLET, ORALLY DISINTEGRATING ORAL at 21:39

## 2022-01-01 RX ADMIN — Medication 5 MILLIGRAM(S): at 15:48

## 2022-01-01 RX ADMIN — CEFEPIME 100 MILLIGRAM(S): 1 INJECTION, POWDER, FOR SOLUTION INTRAMUSCULAR; INTRAVENOUS at 13:28

## 2022-01-01 RX ADMIN — Medication 5 MILLIGRAM(S): at 21:46

## 2022-01-01 RX ADMIN — Medication 5 MILLIGRAM(S): at 05:36

## 2022-01-01 RX ADMIN — SODIUM CHLORIDE 3 MILLILITER(S): 9 INJECTION INTRAMUSCULAR; INTRAVENOUS; SUBCUTANEOUS at 21:00

## 2022-01-01 RX ADMIN — Medication 1: at 05:55

## 2022-01-01 RX ADMIN — MAGNESIUM OXIDE 400 MG ORAL TABLET 400 MILLIGRAM(S): 241.3 TABLET ORAL at 08:31

## 2022-01-01 RX ADMIN — GABAPENTIN 125 MILLIGRAM(S): 400 CAPSULE ORAL at 21:27

## 2022-01-01 RX ADMIN — SENNA PLUS 2 TABLET(S): 8.6 TABLET ORAL at 22:30

## 2022-01-01 RX ADMIN — Medication 10 MILLIGRAM(S): at 23:29

## 2022-01-01 RX ADMIN — MEXILETINE HYDROCHLORIDE 150 MILLIGRAM(S): 150 CAPSULE ORAL at 05:52

## 2022-01-01 RX ADMIN — MEXILETINE HYDROCHLORIDE 150 MILLIGRAM(S): 150 CAPSULE ORAL at 21:25

## 2022-01-01 RX ADMIN — Medication 25 MILLIGRAM(S): at 06:19

## 2022-01-01 RX ADMIN — SODIUM CHLORIDE 3 MILLILITER(S): 9 INJECTION INTRAMUSCULAR; INTRAVENOUS; SUBCUTANEOUS at 21:15

## 2022-01-01 RX ADMIN — CHLORHEXIDINE GLUCONATE 1 APPLICATION(S): 213 SOLUTION TOPICAL at 05:20

## 2022-01-01 RX ADMIN — Medication 2000 UNIT(S): at 21:51

## 2022-01-01 RX ADMIN — MEROPENEM 100 MILLIGRAM(S): 1 INJECTION INTRAVENOUS at 20:57

## 2022-01-01 RX ADMIN — Medication 5 MILLIGRAM(S): at 18:23

## 2022-01-01 RX ADMIN — SODIUM CHLORIDE 3 MILLILITER(S): 9 INJECTION INTRAMUSCULAR; INTRAVENOUS; SUBCUTANEOUS at 13:20

## 2022-01-01 RX ADMIN — MEXILETINE HYDROCHLORIDE 150 MILLIGRAM(S): 150 CAPSULE ORAL at 13:59

## 2022-01-01 RX ADMIN — SPIRONOLACTONE 25 MILLIGRAM(S): 25 TABLET, FILM COATED ORAL at 09:10

## 2022-01-01 RX ADMIN — CEFEPIME 100 MILLIGRAM(S): 1 INJECTION, POWDER, FOR SOLUTION INTRAMUSCULAR; INTRAVENOUS at 05:05

## 2022-01-01 RX ADMIN — Medication 1: at 12:37

## 2022-01-01 RX ADMIN — CHLORHEXIDINE GLUCONATE 1 APPLICATION(S): 213 SOLUTION TOPICAL at 08:16

## 2022-01-01 RX ADMIN — SODIUM CHLORIDE 3 MILLILITER(S): 9 INJECTION INTRAMUSCULAR; INTRAVENOUS; SUBCUTANEOUS at 14:42

## 2022-01-01 RX ADMIN — SODIUM CHLORIDE 500 MILLILITER(S): 9 INJECTION, SOLUTION INTRAVENOUS at 20:04

## 2022-01-01 RX ADMIN — CHLORHEXIDINE GLUCONATE 1 APPLICATION(S): 213 SOLUTION TOPICAL at 06:45

## 2022-01-01 RX ADMIN — MEXILETINE HYDROCHLORIDE 150 MILLIGRAM(S): 150 CAPSULE ORAL at 21:53

## 2022-01-01 RX ADMIN — Medication 100 MILLIGRAM(S): at 12:15

## 2022-01-01 RX ADMIN — Medication 1 TABLET(S): at 05:10

## 2022-01-01 RX ADMIN — SODIUM CHLORIDE 1 GRAM(S): 9 INJECTION INTRAMUSCULAR; INTRAVENOUS; SUBCUTANEOUS at 05:47

## 2022-01-01 RX ADMIN — Medication 2000 UNIT(S): at 10:49

## 2022-01-01 RX ADMIN — Medication 10 MILLIGRAM(S): at 12:37

## 2022-01-01 RX ADMIN — HYDROMORPHONE HYDROCHLORIDE 0.2 MILLIGRAM(S): 2 INJECTION INTRAMUSCULAR; INTRAVENOUS; SUBCUTANEOUS at 15:00

## 2022-01-01 RX ADMIN — MEXILETINE HYDROCHLORIDE 150 MILLIGRAM(S): 150 CAPSULE ORAL at 05:12

## 2022-01-01 RX ADMIN — SODIUM CHLORIDE 3 MILLILITER(S): 9 INJECTION INTRAMUSCULAR; INTRAVENOUS; SUBCUTANEOUS at 12:34

## 2022-01-01 RX ADMIN — Medication 50 MILLIGRAM(S): at 08:49

## 2022-01-01 RX ADMIN — Medication 10 MILLIGRAM(S): at 05:25

## 2022-01-01 RX ADMIN — Medication 650 MILLIGRAM(S): at 18:09

## 2022-01-01 RX ADMIN — MAGNESIUM OXIDE 400 MG ORAL TABLET 400 MILLIGRAM(S): 241.3 TABLET ORAL at 18:02

## 2022-01-01 RX ADMIN — Medication 1 TABLET(S): at 12:02

## 2022-01-01 RX ADMIN — PANTOPRAZOLE SODIUM 40 MILLIGRAM(S): 20 TABLET, DELAYED RELEASE ORAL at 17:14

## 2022-01-01 RX ADMIN — GABAPENTIN 125 MILLIGRAM(S): 400 CAPSULE ORAL at 05:37

## 2022-01-01 RX ADMIN — SODIUM CHLORIDE 3 MILLILITER(S): 9 INJECTION INTRAMUSCULAR; INTRAVENOUS; SUBCUTANEOUS at 21:49

## 2022-01-01 RX ADMIN — Medication 25 MILLIGRAM(S): at 06:28

## 2022-01-01 RX ADMIN — Medication 1000 UNIT(S): at 11:24

## 2022-01-01 RX ADMIN — Medication 1 TABLET(S): at 13:38

## 2022-01-01 RX ADMIN — SODIUM CHLORIDE 3 MILLILITER(S): 9 INJECTION INTRAMUSCULAR; INTRAVENOUS; SUBCUTANEOUS at 05:27

## 2022-01-01 RX ADMIN — SODIUM CHLORIDE 1 GRAM(S): 9 INJECTION INTRAMUSCULAR; INTRAVENOUS; SUBCUTANEOUS at 05:29

## 2022-01-01 RX ADMIN — CHLORHEXIDINE GLUCONATE 1 APPLICATION(S): 213 SOLUTION TOPICAL at 08:25

## 2022-01-01 RX ADMIN — Medication 5 MILLIGRAM(S): at 14:44

## 2022-01-01 RX ADMIN — SODIUM CHLORIDE 3 MILLILITER(S): 9 INJECTION INTRAMUSCULAR; INTRAVENOUS; SUBCUTANEOUS at 05:08

## 2022-01-01 RX ADMIN — Medication 50 MILLIGRAM(S): at 05:36

## 2022-01-01 RX ADMIN — MEXILETINE HYDROCHLORIDE 150 MILLIGRAM(S): 150 CAPSULE ORAL at 00:57

## 2022-01-01 RX ADMIN — Medication 1000 UNIT(S): at 11:35

## 2022-01-01 RX ADMIN — MEXILETINE HYDROCHLORIDE 150 MILLIGRAM(S): 150 CAPSULE ORAL at 22:44

## 2022-01-01 RX ADMIN — CEFEPIME 100 MILLIGRAM(S): 1 INJECTION, POWDER, FOR SOLUTION INTRAMUSCULAR; INTRAVENOUS at 00:28

## 2022-01-01 RX ADMIN — Medication 2.5 MILLIGRAM(S): at 05:01

## 2022-01-01 RX ADMIN — CHLORHEXIDINE GLUCONATE 1 APPLICATION(S): 213 SOLUTION TOPICAL at 08:41

## 2022-01-01 RX ADMIN — Medication 1 TABLET(S): at 11:11

## 2022-01-01 RX ADMIN — CEFEPIME 100 MILLIGRAM(S): 1 INJECTION, POWDER, FOR SOLUTION INTRAMUSCULAR; INTRAVENOUS at 11:39

## 2022-01-01 RX ADMIN — Medication 50 MILLIGRAM(S): at 09:21

## 2022-01-01 RX ADMIN — MEXILETINE HYDROCHLORIDE 150 MILLIGRAM(S): 150 CAPSULE ORAL at 13:44

## 2022-01-01 RX ADMIN — Medication 10 MILLIGRAM(S): at 05:26

## 2022-01-01 RX ADMIN — SENNA PLUS 2 TABLET(S): 8.6 TABLET ORAL at 21:11

## 2022-01-01 RX ADMIN — SODIUM CHLORIDE 3 MILLILITER(S): 9 INJECTION INTRAMUSCULAR; INTRAVENOUS; SUBCUTANEOUS at 06:53

## 2022-01-01 RX ADMIN — SODIUM CHLORIDE 50 MILLILITER(S): 9 INJECTION, SOLUTION INTRAVENOUS at 05:12

## 2022-01-01 RX ADMIN — GABAPENTIN 100 MILLIGRAM(S): 400 CAPSULE ORAL at 06:03

## 2022-01-01 RX ADMIN — SODIUM CHLORIDE 1 GRAM(S): 9 INJECTION INTRAMUSCULAR; INTRAVENOUS; SUBCUTANEOUS at 04:08

## 2022-01-01 RX ADMIN — SERTRALINE 100 MILLIGRAM(S): 25 TABLET, FILM COATED ORAL at 11:34

## 2022-01-01 RX ADMIN — SERTRALINE 100 MILLIGRAM(S): 25 TABLET, FILM COATED ORAL at 11:44

## 2022-01-01 RX ADMIN — CHLORHEXIDINE GLUCONATE 1 APPLICATION(S): 213 SOLUTION TOPICAL at 10:38

## 2022-01-01 RX ADMIN — GABAPENTIN 125 MILLIGRAM(S): 400 CAPSULE ORAL at 16:54

## 2022-01-01 RX ADMIN — Medication 25 MILLIGRAM(S): at 05:59

## 2022-01-01 RX ADMIN — Medication 100 MILLIGRAM(S): at 18:15

## 2022-01-01 RX ADMIN — SERTRALINE 100 MILLIGRAM(S): 25 TABLET, FILM COATED ORAL at 13:31

## 2022-01-01 RX ADMIN — Medication 50 MILLIGRAM(S): at 21:12

## 2022-01-01 RX ADMIN — SODIUM CHLORIDE 3 MILLILITER(S): 9 INJECTION INTRAMUSCULAR; INTRAVENOUS; SUBCUTANEOUS at 12:30

## 2022-01-01 RX ADMIN — MEXILETINE HYDROCHLORIDE 150 MILLIGRAM(S): 150 CAPSULE ORAL at 22:11

## 2022-01-01 RX ADMIN — SERTRALINE 100 MILLIGRAM(S): 25 TABLET, FILM COATED ORAL at 11:31

## 2022-01-01 RX ADMIN — Medication 125 MILLIGRAM(S): at 05:43

## 2022-01-01 RX ADMIN — SENNA PLUS 2 TABLET(S): 8.6 TABLET ORAL at 22:21

## 2022-01-01 RX ADMIN — SENNA PLUS 2 TABLET(S): 8.6 TABLET ORAL at 22:00

## 2022-01-01 RX ADMIN — SODIUM CHLORIDE 3 MILLILITER(S): 9 INJECTION INTRAMUSCULAR; INTRAVENOUS; SUBCUTANEOUS at 16:44

## 2022-01-01 RX ADMIN — Medication 10 MILLIGRAM(S): at 05:00

## 2022-01-01 RX ADMIN — PANTOPRAZOLE SODIUM 40 MILLIGRAM(S): 20 TABLET, DELAYED RELEASE ORAL at 06:03

## 2022-01-01 RX ADMIN — PANTOPRAZOLE SODIUM 40 MILLIGRAM(S): 20 TABLET, DELAYED RELEASE ORAL at 18:06

## 2022-01-01 RX ADMIN — Medication 10 MILLIGRAM(S): at 20:43

## 2022-01-01 RX ADMIN — PIPERACILLIN AND TAZOBACTAM 25 GRAM(S): 4; .5 INJECTION, POWDER, LYOPHILIZED, FOR SOLUTION INTRAVENOUS at 05:46

## 2022-01-01 RX ADMIN — MAGNESIUM OXIDE 400 MG ORAL TABLET 400 MILLIGRAM(S): 241.3 TABLET ORAL at 08:23

## 2022-01-01 RX ADMIN — PANTOPRAZOLE SODIUM 40 MILLIGRAM(S): 20 TABLET, DELAYED RELEASE ORAL at 17:24

## 2022-01-01 RX ADMIN — SODIUM CHLORIDE 3 MILLILITER(S): 9 INJECTION INTRAMUSCULAR; INTRAVENOUS; SUBCUTANEOUS at 22:05

## 2022-01-01 RX ADMIN — MEROPENEM 100 MILLIGRAM(S): 1 INJECTION INTRAVENOUS at 05:01

## 2022-01-01 RX ADMIN — SERTRALINE 100 MILLIGRAM(S): 25 TABLET, FILM COATED ORAL at 12:39

## 2022-01-01 RX ADMIN — CHLORHEXIDINE GLUCONATE 1 APPLICATION(S): 213 SOLUTION TOPICAL at 12:45

## 2022-01-01 RX ADMIN — MEXILETINE HYDROCHLORIDE 150 MILLIGRAM(S): 150 CAPSULE ORAL at 21:17

## 2022-01-01 RX ADMIN — SODIUM CHLORIDE 1 GRAM(S): 9 INJECTION INTRAMUSCULAR; INTRAVENOUS; SUBCUTANEOUS at 16:28

## 2022-01-01 RX ADMIN — PIPERACILLIN AND TAZOBACTAM 25 GRAM(S): 4; .5 INJECTION, POWDER, LYOPHILIZED, FOR SOLUTION INTRAVENOUS at 18:50

## 2022-01-01 RX ADMIN — Medication 50 MILLIGRAM(S): at 18:06

## 2022-01-01 RX ADMIN — Medication 1: at 17:16

## 2022-01-01 RX ADMIN — Medication 81 MILLIGRAM(S): at 13:07

## 2022-01-01 RX ADMIN — Medication 2.5 MILLIGRAM(S): at 22:14

## 2022-01-01 RX ADMIN — SPIRONOLACTONE 25 MILLIGRAM(S): 25 TABLET, FILM COATED ORAL at 08:13

## 2022-01-01 RX ADMIN — Medication 500 MILLIGRAM(S): at 11:44

## 2022-01-01 RX ADMIN — Medication 500 MILLIGRAM(S): at 11:36

## 2022-01-01 RX ADMIN — Medication 2000 UNIT(S): at 12:09

## 2022-01-01 RX ADMIN — Medication 10 MILLIGRAM(S): at 04:54

## 2022-01-01 RX ADMIN — SERTRALINE 100 MILLIGRAM(S): 25 TABLET, FILM COATED ORAL at 11:22

## 2022-01-01 RX ADMIN — Medication 50 MILLIGRAM(S): at 06:09

## 2022-01-01 RX ADMIN — SODIUM CHLORIDE 1 GRAM(S): 9 INJECTION INTRAMUSCULAR; INTRAVENOUS; SUBCUTANEOUS at 17:43

## 2022-01-01 RX ADMIN — PANTOPRAZOLE SODIUM 40 MILLIGRAM(S): 20 TABLET, DELAYED RELEASE ORAL at 17:26

## 2022-01-01 RX ADMIN — MEXILETINE HYDROCHLORIDE 150 MILLIGRAM(S): 150 CAPSULE ORAL at 16:33

## 2022-01-01 RX ADMIN — Medication 7.5 MG/MIN: at 23:36

## 2022-01-01 RX ADMIN — Medication 25 MILLIGRAM(S): at 05:46

## 2022-01-01 RX ADMIN — SODIUM CHLORIDE 3 MILLILITER(S): 9 INJECTION INTRAMUSCULAR; INTRAVENOUS; SUBCUTANEOUS at 05:05

## 2022-01-01 RX ADMIN — Medication 1: at 18:03

## 2022-01-01 RX ADMIN — Medication 1 TABLET(S): at 17:21

## 2022-01-01 RX ADMIN — Medication 1 TABLET(S): at 05:28

## 2022-01-01 RX ADMIN — SERTRALINE 100 MILLIGRAM(S): 25 TABLET, FILM COATED ORAL at 11:12

## 2022-01-01 RX ADMIN — HYDROMORPHONE HYDROCHLORIDE 0.2 MILLIGRAM(S): 2 INJECTION INTRAMUSCULAR; INTRAVENOUS; SUBCUTANEOUS at 15:44

## 2022-01-01 RX ADMIN — SODIUM CHLORIDE 3 MILLILITER(S): 9 INJECTION INTRAMUSCULAR; INTRAVENOUS; SUBCUTANEOUS at 05:11

## 2022-01-01 RX ADMIN — PIPERACILLIN AND TAZOBACTAM 25 GRAM(S): 4; .5 INJECTION, POWDER, LYOPHILIZED, FOR SOLUTION INTRAVENOUS at 15:13

## 2022-01-01 RX ADMIN — CEFEPIME 100 MILLIGRAM(S): 1 INJECTION, POWDER, FOR SOLUTION INTRAMUSCULAR; INTRAVENOUS at 12:38

## 2022-01-01 RX ADMIN — Medication 100 MILLIGRAM(S): at 12:39

## 2022-01-01 RX ADMIN — MIRTAZAPINE 7.5 MILLIGRAM(S): 45 TABLET, ORALLY DISINTEGRATING ORAL at 21:13

## 2022-01-01 RX ADMIN — Medication 500 MILLIGRAM(S): at 11:38

## 2022-01-01 RX ADMIN — PIPERACILLIN AND TAZOBACTAM 25 GRAM(S): 4; .5 INJECTION, POWDER, LYOPHILIZED, FOR SOLUTION INTRAVENOUS at 05:40

## 2022-01-01 RX ADMIN — Medication 5 MILLIGRAM(S): at 22:32

## 2022-01-01 RX ADMIN — SODIUM CHLORIDE 1 GRAM(S): 9 INJECTION INTRAMUSCULAR; INTRAVENOUS; SUBCUTANEOUS at 20:46

## 2022-01-01 RX ADMIN — PANTOPRAZOLE SODIUM 40 MILLIGRAM(S): 20 TABLET, DELAYED RELEASE ORAL at 18:09

## 2022-01-01 RX ADMIN — GABAPENTIN 125 MILLIGRAM(S): 400 CAPSULE ORAL at 06:25

## 2022-01-01 RX ADMIN — Medication 3 MILLIGRAM(S): at 21:07

## 2022-01-01 RX ADMIN — SODIUM CHLORIDE 3 MILLILITER(S): 9 INJECTION INTRAMUSCULAR; INTRAVENOUS; SUBCUTANEOUS at 21:57

## 2022-01-01 RX ADMIN — Medication 1 TABLET(S): at 17:08

## 2022-01-01 RX ADMIN — Medication 50 MILLIGRAM(S): at 17:33

## 2022-01-01 RX ADMIN — Medication 50 MILLIGRAM(S): at 17:23

## 2022-01-01 RX ADMIN — Medication 1 TABLET(S): at 11:38

## 2022-01-01 RX ADMIN — Medication 1 TABLET(S): at 17:06

## 2022-01-01 RX ADMIN — SODIUM CHLORIDE 3 MILLILITER(S): 9 INJECTION INTRAMUSCULAR; INTRAVENOUS; SUBCUTANEOUS at 05:15

## 2022-01-01 RX ADMIN — PANTOPRAZOLE SODIUM 40 MILLIGRAM(S): 20 TABLET, DELAYED RELEASE ORAL at 17:15

## 2022-01-01 RX ADMIN — CHLORHEXIDINE GLUCONATE 1 APPLICATION(S): 213 SOLUTION TOPICAL at 06:02

## 2022-01-01 RX ADMIN — Medication 1: at 17:32

## 2022-01-01 RX ADMIN — Medication 50 MILLIGRAM(S): at 05:51

## 2022-01-01 RX ADMIN — Medication 81 MILLIGRAM(S): at 11:29

## 2022-01-01 RX ADMIN — SERTRALINE 100 MILLIGRAM(S): 25 TABLET, FILM COATED ORAL at 12:37

## 2022-01-01 RX ADMIN — Medication 50 MILLIGRAM(S): at 09:29

## 2022-01-01 RX ADMIN — PIPERACILLIN AND TAZOBACTAM 25 GRAM(S): 4; .5 INJECTION, POWDER, LYOPHILIZED, FOR SOLUTION INTRAVENOUS at 05:15

## 2022-01-01 RX ADMIN — SENNA PLUS 2 TABLET(S): 8.6 TABLET ORAL at 21:13

## 2022-01-01 RX ADMIN — SENNA PLUS 2 TABLET(S): 8.6 TABLET ORAL at 23:00

## 2022-01-01 RX ADMIN — PANTOPRAZOLE SODIUM 40 MILLIGRAM(S): 20 TABLET, DELAYED RELEASE ORAL at 05:03

## 2022-01-01 RX ADMIN — Medication 1000 UNIT(S): at 12:27

## 2022-01-01 RX ADMIN — Medication 3 MILLIGRAM(S): at 21:49

## 2022-01-01 RX ADMIN — PANTOPRAZOLE SODIUM 40 MILLIGRAM(S): 20 TABLET, DELAYED RELEASE ORAL at 05:59

## 2022-01-01 RX ADMIN — VASOPRESSIN 3.6 UNIT(S)/MIN: 20 INJECTION INTRAVENOUS at 05:29

## 2022-01-01 RX ADMIN — SERTRALINE 100 MILLIGRAM(S): 25 TABLET, FILM COATED ORAL at 12:15

## 2022-01-01 RX ADMIN — SODIUM CHLORIDE 1 GRAM(S): 9 INJECTION INTRAMUSCULAR; INTRAVENOUS; SUBCUTANEOUS at 18:04

## 2022-01-01 RX ADMIN — SODIUM ZIRCONIUM CYCLOSILICATE 10 GRAM(S): 10 POWDER, FOR SUSPENSION ORAL at 10:36

## 2022-01-01 RX ADMIN — PANTOPRAZOLE SODIUM 40 MILLIGRAM(S): 20 TABLET, DELAYED RELEASE ORAL at 05:05

## 2022-01-01 RX ADMIN — Medication 1 TABLET(S): at 12:51

## 2022-01-01 RX ADMIN — Medication 5 MILLIGRAM(S): at 05:59

## 2022-01-01 RX ADMIN — POLYETHYLENE GLYCOL 3350 17 GRAM(S): 17 POWDER, FOR SOLUTION ORAL at 05:40

## 2022-01-01 RX ADMIN — GABAPENTIN 100 MILLIGRAM(S): 400 CAPSULE ORAL at 17:18

## 2022-01-01 RX ADMIN — Medication 50 MILLIEQUIVALENT(S): at 01:30

## 2022-01-01 RX ADMIN — INSULIN HUMAN 10 UNIT(S): 100 INJECTION, SOLUTION SUBCUTANEOUS at 18:56

## 2022-01-01 RX ADMIN — Medication 5 MILLIGRAM(S): at 08:55

## 2022-01-01 RX ADMIN — Medication 10 MILLIGRAM(S): at 23:13

## 2022-01-01 RX ADMIN — Medication 1000 UNIT(S): at 11:20

## 2022-01-01 RX ADMIN — SODIUM CHLORIDE 1 GRAM(S): 9 INJECTION INTRAMUSCULAR; INTRAVENOUS; SUBCUTANEOUS at 05:53

## 2022-01-01 RX ADMIN — Medication 100 MILLIGRAM(S): at 13:47

## 2022-01-01 RX ADMIN — Medication 1000 UNIT(S): at 11:36

## 2022-01-01 RX ADMIN — PANTOPRAZOLE SODIUM 40 MILLIGRAM(S): 20 TABLET, DELAYED RELEASE ORAL at 05:22

## 2022-01-01 RX ADMIN — SODIUM CHLORIDE 1 GRAM(S): 9 INJECTION INTRAMUSCULAR; INTRAVENOUS; SUBCUTANEOUS at 22:12

## 2022-01-01 RX ADMIN — Medication 975 MILLIGRAM(S): at 16:49

## 2022-01-01 RX ADMIN — SERTRALINE 100 MILLIGRAM(S): 25 TABLET, FILM COATED ORAL at 10:49

## 2022-01-01 RX ADMIN — GABAPENTIN 100 MILLIGRAM(S): 400 CAPSULE ORAL at 17:47

## 2022-01-01 RX ADMIN — MEROPENEM 100 MILLIGRAM(S): 1 INJECTION INTRAVENOUS at 18:00

## 2022-01-01 RX ADMIN — MAGNESIUM OXIDE 400 MG ORAL TABLET 400 MILLIGRAM(S): 241.3 TABLET ORAL at 08:15

## 2022-01-01 RX ADMIN — SODIUM CHLORIDE 1 GRAM(S): 9 INJECTION INTRAMUSCULAR; INTRAVENOUS; SUBCUTANEOUS at 14:04

## 2022-01-01 RX ADMIN — SENNA PLUS 2 TABLET(S): 8.6 TABLET ORAL at 21:48

## 2022-01-01 RX ADMIN — MAGNESIUM OXIDE 400 MG ORAL TABLET 400 MILLIGRAM(S): 241.3 TABLET ORAL at 09:17

## 2022-01-01 RX ADMIN — MEXILETINE HYDROCHLORIDE 150 MILLIGRAM(S): 150 CAPSULE ORAL at 21:58

## 2022-01-01 RX ADMIN — SODIUM CHLORIDE 3 MILLILITER(S): 9 INJECTION INTRAMUSCULAR; INTRAVENOUS; SUBCUTANEOUS at 14:12

## 2022-01-01 RX ADMIN — Medication 10 MILLIGRAM(S): at 00:47

## 2022-01-01 RX ADMIN — Medication 50 MILLIGRAM(S): at 18:18

## 2022-01-01 RX ADMIN — CHLORHEXIDINE GLUCONATE 1 APPLICATION(S): 213 SOLUTION TOPICAL at 08:00

## 2022-01-01 RX ADMIN — SODIUM CHLORIDE 1 GRAM(S): 9 INJECTION INTRAMUSCULAR; INTRAVENOUS; SUBCUTANEOUS at 05:58

## 2022-01-01 RX ADMIN — Medication 500 MILLIGRAM(S): at 06:18

## 2022-01-01 RX ADMIN — Medication 1 TABLET(S): at 17:02

## 2022-01-01 RX ADMIN — SERTRALINE 100 MILLIGRAM(S): 25 TABLET, FILM COATED ORAL at 11:49

## 2022-01-01 RX ADMIN — Medication 50 MILLIGRAM(S): at 05:28

## 2022-01-01 RX ADMIN — SODIUM CHLORIDE 3 MILLILITER(S): 9 INJECTION INTRAMUSCULAR; INTRAVENOUS; SUBCUTANEOUS at 21:44

## 2022-01-01 RX ADMIN — PANTOPRAZOLE SODIUM 40 MILLIGRAM(S): 20 TABLET, DELAYED RELEASE ORAL at 17:01

## 2022-01-01 RX ADMIN — Medication 2000 UNIT(S): at 12:12

## 2022-01-01 RX ADMIN — SODIUM CHLORIDE 500 MILLILITER(S): 9 INJECTION, SOLUTION INTRAVENOUS at 09:31

## 2022-01-01 RX ADMIN — PIPERACILLIN AND TAZOBACTAM 25 GRAM(S): 4; .5 INJECTION, POWDER, LYOPHILIZED, FOR SOLUTION INTRAVENOUS at 05:25

## 2022-01-01 RX ADMIN — MEXILETINE HYDROCHLORIDE 150 MILLIGRAM(S): 150 CAPSULE ORAL at 13:16

## 2022-01-01 RX ADMIN — SERTRALINE 100 MILLIGRAM(S): 25 TABLET, FILM COATED ORAL at 16:40

## 2022-01-01 RX ADMIN — GABAPENTIN 100 MILLIGRAM(S): 400 CAPSULE ORAL at 17:46

## 2022-01-01 RX ADMIN — Medication 50 MILLIGRAM(S): at 17:54

## 2022-01-01 RX ADMIN — SODIUM CHLORIDE 3 MILLILITER(S): 9 INJECTION INTRAMUSCULAR; INTRAVENOUS; SUBCUTANEOUS at 14:00

## 2022-01-01 RX ADMIN — Medication 100 MILLIGRAM(S): at 11:38

## 2022-01-01 RX ADMIN — PIPERACILLIN AND TAZOBACTAM 25 GRAM(S): 4; .5 INJECTION, POWDER, LYOPHILIZED, FOR SOLUTION INTRAVENOUS at 14:45

## 2022-01-01 RX ADMIN — MAGNESIUM OXIDE 400 MG ORAL TABLET 400 MILLIGRAM(S): 241.3 TABLET ORAL at 17:21

## 2022-01-01 RX ADMIN — Medication 5 MILLIGRAM(S): at 05:37

## 2022-01-01 RX ADMIN — Medication 10 MILLIGRAM(S): at 21:45

## 2022-01-01 RX ADMIN — MEXILETINE HYDROCHLORIDE 150 MILLIGRAM(S): 150 CAPSULE ORAL at 05:00

## 2022-01-01 RX ADMIN — MIDODRINE HYDROCHLORIDE 10 MILLIGRAM(S): 2.5 TABLET ORAL at 05:57

## 2022-01-01 RX ADMIN — MAGNESIUM OXIDE 400 MG ORAL TABLET 400 MILLIGRAM(S): 241.3 TABLET ORAL at 11:41

## 2022-01-01 RX ADMIN — Medication 50 MILLILITER(S): at 07:15

## 2022-01-01 RX ADMIN — VASOPRESSIN 1.2 UNIT(S)/MIN: 20 INJECTION INTRAVENOUS at 17:26

## 2022-01-01 RX ADMIN — Medication 1: at 17:45

## 2022-01-01 RX ADMIN — PIPERACILLIN AND TAZOBACTAM 25 GRAM(S): 4; .5 INJECTION, POWDER, LYOPHILIZED, FOR SOLUTION INTRAVENOUS at 22:24

## 2022-01-01 RX ADMIN — SENNA PLUS 2 TABLET(S): 8.6 TABLET ORAL at 21:27

## 2022-01-01 RX ADMIN — Medication 10 MILLIGRAM(S): at 17:26

## 2022-01-01 RX ADMIN — Medication 1 TABLET(S): at 11:22

## 2022-01-01 RX ADMIN — Medication 2000 UNIT(S): at 13:22

## 2022-01-01 RX ADMIN — Medication 125 MILLILITER(S): at 06:09

## 2022-01-01 RX ADMIN — Medication 50 MILLIGRAM(S): at 05:37

## 2022-01-01 RX ADMIN — CEFEPIME 100 MILLIGRAM(S): 1 INJECTION, POWDER, FOR SOLUTION INTRAMUSCULAR; INTRAVENOUS at 06:08

## 2022-01-01 RX ADMIN — SODIUM CHLORIDE 3 MILLILITER(S): 9 INJECTION INTRAMUSCULAR; INTRAVENOUS; SUBCUTANEOUS at 04:48

## 2022-01-01 RX ADMIN — Medication 125 MILLIGRAM(S): at 18:02

## 2022-01-01 RX ADMIN — MEXILETINE HYDROCHLORIDE 150 MILLIGRAM(S): 150 CAPSULE ORAL at 22:30

## 2022-01-01 RX ADMIN — Medication 500 MILLIGRAM(S): at 11:39

## 2022-01-01 RX ADMIN — Medication 1: at 06:38

## 2022-01-01 RX ADMIN — SERTRALINE 100 MILLIGRAM(S): 25 TABLET, FILM COATED ORAL at 11:42

## 2022-01-01 RX ADMIN — SODIUM CHLORIDE 3 MILLILITER(S): 9 INJECTION INTRAMUSCULAR; INTRAVENOUS; SUBCUTANEOUS at 15:07

## 2022-01-01 RX ADMIN — MIRTAZAPINE 7.5 MILLIGRAM(S): 45 TABLET, ORALLY DISINTEGRATING ORAL at 22:22

## 2022-01-01 RX ADMIN — Medication 166.67 MILLILITER(S): at 08:08

## 2022-01-01 RX ADMIN — POLYETHYLENE GLYCOL 3350 17 GRAM(S): 17 POWDER, FOR SOLUTION ORAL at 05:58

## 2022-01-01 RX ADMIN — Medication 100 MILLIGRAM(S): at 10:48

## 2022-01-01 RX ADMIN — Medication 2000 UNIT(S): at 11:22

## 2022-01-01 RX ADMIN — MIRTAZAPINE 7.5 MILLIGRAM(S): 45 TABLET, ORALLY DISINTEGRATING ORAL at 21:25

## 2022-01-01 RX ADMIN — PANTOPRAZOLE SODIUM 40 MILLIGRAM(S): 20 TABLET, DELAYED RELEASE ORAL at 05:37

## 2022-01-01 RX ADMIN — SODIUM CHLORIDE 3 MILLILITER(S): 9 INJECTION INTRAMUSCULAR; INTRAVENOUS; SUBCUTANEOUS at 15:57

## 2022-01-01 RX ADMIN — SODIUM CHLORIDE 1 GRAM(S): 9 INJECTION INTRAMUSCULAR; INTRAVENOUS; SUBCUTANEOUS at 22:19

## 2022-01-01 RX ADMIN — Medication 100 MILLIGRAM(S): at 11:34

## 2022-01-01 RX ADMIN — Medication 10 MILLIGRAM(S): at 13:29

## 2022-01-01 RX ADMIN — Medication 10 MILLIGRAM(S): at 05:35

## 2022-01-01 RX ADMIN — Medication 10 MILLIGRAM(S): at 12:30

## 2022-01-01 RX ADMIN — MEXILETINE HYDROCHLORIDE 150 MILLIGRAM(S): 150 CAPSULE ORAL at 05:44

## 2022-01-01 RX ADMIN — CHLORHEXIDINE GLUCONATE 1 APPLICATION(S): 213 SOLUTION TOPICAL at 14:27

## 2022-01-01 RX ADMIN — MEROPENEM 100 MILLIGRAM(S): 1 INJECTION INTRAVENOUS at 21:22

## 2022-01-01 RX ADMIN — MEXILETINE HYDROCHLORIDE 150 MILLIGRAM(S): 150 CAPSULE ORAL at 12:44

## 2022-01-01 RX ADMIN — ENOXAPARIN SODIUM 30 MILLIGRAM(S): 100 INJECTION SUBCUTANEOUS at 13:42

## 2022-01-01 RX ADMIN — VASOPRESSIN 2.4 UNIT(S)/MIN: 20 INJECTION INTRAVENOUS at 09:00

## 2022-01-01 RX ADMIN — Medication 2000 UNIT(S): at 11:43

## 2022-01-01 RX ADMIN — PANTOPRAZOLE SODIUM 40 MILLIGRAM(S): 20 TABLET, DELAYED RELEASE ORAL at 05:12

## 2022-01-01 RX ADMIN — Medication 1: at 13:12

## 2022-01-01 RX ADMIN — Medication 5 MILLIGRAM(S): at 21:19

## 2022-01-01 RX ADMIN — SODIUM ZIRCONIUM CYCLOSILICATE 10 GRAM(S): 10 POWDER, FOR SUSPENSION ORAL at 12:27

## 2022-01-01 RX ADMIN — MAGNESIUM OXIDE 400 MG ORAL TABLET 400 MILLIGRAM(S): 241.3 TABLET ORAL at 12:39

## 2022-01-01 RX ADMIN — SODIUM CHLORIDE 3 MILLILITER(S): 9 INJECTION INTRAMUSCULAR; INTRAVENOUS; SUBCUTANEOUS at 21:01

## 2022-01-01 RX ADMIN — CEFEPIME 100 MILLIGRAM(S): 1 INJECTION, POWDER, FOR SOLUTION INTRAMUSCULAR; INTRAVENOUS at 12:11

## 2022-01-01 RX ADMIN — Medication 5 MILLIGRAM(S): at 06:28

## 2022-01-01 RX ADMIN — PANTOPRAZOLE SODIUM 40 MILLIGRAM(S): 20 TABLET, DELAYED RELEASE ORAL at 11:11

## 2022-01-01 RX ADMIN — SODIUM CHLORIDE 3 MILLILITER(S): 9 INJECTION INTRAMUSCULAR; INTRAVENOUS; SUBCUTANEOUS at 22:33

## 2022-01-01 RX ADMIN — MEXILETINE HYDROCHLORIDE 150 MILLIGRAM(S): 150 CAPSULE ORAL at 14:57

## 2022-01-01 RX ADMIN — SODIUM CHLORIDE 1 GRAM(S): 9 INJECTION INTRAMUSCULAR; INTRAVENOUS; SUBCUTANEOUS at 14:22

## 2022-01-01 RX ADMIN — PANTOPRAZOLE SODIUM 40 MILLIGRAM(S): 20 TABLET, DELAYED RELEASE ORAL at 05:18

## 2022-01-01 RX ADMIN — PANTOPRAZOLE SODIUM 40 MILLIGRAM(S): 20 TABLET, DELAYED RELEASE ORAL at 05:58

## 2022-01-01 RX ADMIN — Medication 10 MILLIGRAM(S): at 05:11

## 2022-01-01 RX ADMIN — SODIUM CHLORIDE 1 GRAM(S): 9 INJECTION INTRAMUSCULAR; INTRAVENOUS; SUBCUTANEOUS at 13:24

## 2022-01-01 RX ADMIN — SODIUM CHLORIDE 3 MILLILITER(S): 9 INJECTION INTRAMUSCULAR; INTRAVENOUS; SUBCUTANEOUS at 05:29

## 2022-01-01 RX ADMIN — MAGNESIUM OXIDE 400 MG ORAL TABLET 400 MILLIGRAM(S): 241.3 TABLET ORAL at 12:38

## 2022-01-01 RX ADMIN — Medication 1: at 06:13

## 2022-01-01 RX ADMIN — SODIUM CHLORIDE 3 MILLILITER(S): 9 INJECTION INTRAMUSCULAR; INTRAVENOUS; SUBCUTANEOUS at 14:48

## 2022-01-01 RX ADMIN — Medication 2000 UNIT(S): at 12:39

## 2022-01-01 RX ADMIN — GABAPENTIN 100 MILLIGRAM(S): 400 CAPSULE ORAL at 05:42

## 2022-01-01 RX ADMIN — SODIUM CHLORIDE 500 MILLILITER(S): 9 INJECTION INTRAMUSCULAR; INTRAVENOUS; SUBCUTANEOUS at 14:30

## 2022-01-01 RX ADMIN — POLYETHYLENE GLYCOL 3350 17 GRAM(S): 17 POWDER, FOR SOLUTION ORAL at 05:27

## 2022-01-01 RX ADMIN — CEFEPIME 100 MILLIGRAM(S): 1 INJECTION, POWDER, FOR SOLUTION INTRAMUSCULAR; INTRAVENOUS at 12:15

## 2022-01-01 RX ADMIN — MEXILETINE HYDROCHLORIDE 150 MILLIGRAM(S): 150 CAPSULE ORAL at 05:40

## 2022-01-01 RX ADMIN — MEXILETINE HYDROCHLORIDE 150 MILLIGRAM(S): 150 CAPSULE ORAL at 22:22

## 2022-01-01 RX ADMIN — Medication 5 MILLIGRAM(S): at 16:50

## 2022-01-01 RX ADMIN — Medication 50 MILLIGRAM(S): at 05:56

## 2022-01-01 RX ADMIN — PANTOPRAZOLE SODIUM 40 MILLIGRAM(S): 20 TABLET, DELAYED RELEASE ORAL at 17:05

## 2022-01-01 RX ADMIN — PIPERACILLIN AND TAZOBACTAM 25 GRAM(S): 4; .5 INJECTION, POWDER, LYOPHILIZED, FOR SOLUTION INTRAVENOUS at 20:43

## 2022-01-01 RX ADMIN — MEXILETINE HYDROCHLORIDE 150 MILLIGRAM(S): 150 CAPSULE ORAL at 05:46

## 2022-01-01 RX ADMIN — Medication 5 MILLIGRAM(S): at 16:44

## 2022-01-01 RX ADMIN — MEXILETINE HYDROCHLORIDE 150 MILLIGRAM(S): 150 CAPSULE ORAL at 22:20

## 2022-01-01 RX ADMIN — MAGNESIUM OXIDE 400 MG ORAL TABLET 400 MILLIGRAM(S): 241.3 TABLET ORAL at 08:41

## 2022-01-01 RX ADMIN — Medication 1 TABLET(S): at 11:20

## 2022-01-01 RX ADMIN — PANTOPRAZOLE SODIUM 40 MILLIGRAM(S): 20 TABLET, DELAYED RELEASE ORAL at 11:19

## 2022-01-01 RX ADMIN — Medication 5 MILLIGRAM(S): at 06:50

## 2022-01-01 RX ADMIN — Medication 50 MILLIGRAM(S): at 05:24

## 2022-01-01 RX ADMIN — Medication 10 MILLIGRAM(S): at 05:13

## 2022-01-01 RX ADMIN — SODIUM CHLORIDE 1 GRAM(S): 9 INJECTION INTRAMUSCULAR; INTRAVENOUS; SUBCUTANEOUS at 21:10

## 2022-01-01 RX ADMIN — Medication 3 MILLIGRAM(S): at 22:22

## 2022-01-01 RX ADMIN — SODIUM CHLORIDE 1 GRAM(S): 9 INJECTION INTRAMUSCULAR; INTRAVENOUS; SUBCUTANEOUS at 21:42

## 2022-01-01 RX ADMIN — Medication 7.5 MG/MIN: at 23:00

## 2022-01-01 RX ADMIN — MEXILETINE HYDROCHLORIDE 150 MILLIGRAM(S): 150 CAPSULE ORAL at 05:36

## 2022-01-01 RX ADMIN — CEFEPIME 100 MILLIGRAM(S): 1 INJECTION, POWDER, FOR SOLUTION INTRAMUSCULAR; INTRAVENOUS at 13:44

## 2022-01-01 RX ADMIN — SODIUM CHLORIDE 3 MILLILITER(S): 9 INJECTION INTRAMUSCULAR; INTRAVENOUS; SUBCUTANEOUS at 21:26

## 2022-01-01 RX ADMIN — PIPERACILLIN AND TAZOBACTAM 25 GRAM(S): 4; .5 INJECTION, POWDER, LYOPHILIZED, FOR SOLUTION INTRAVENOUS at 15:52

## 2022-01-01 RX ADMIN — Medication 50 MILLIGRAM(S): at 05:57

## 2022-01-01 RX ADMIN — SODIUM CHLORIDE 3 MILLILITER(S): 9 INJECTION INTRAMUSCULAR; INTRAVENOUS; SUBCUTANEOUS at 05:39

## 2022-01-01 RX ADMIN — SODIUM CHLORIDE 3 MILLILITER(S): 9 INJECTION INTRAMUSCULAR; INTRAVENOUS; SUBCUTANEOUS at 21:10

## 2022-01-01 RX ADMIN — Medication 100 MILLIGRAM(S): at 11:39

## 2022-01-01 RX ADMIN — Medication 1000 UNIT(S): at 14:45

## 2022-01-01 RX ADMIN — MAGNESIUM OXIDE 400 MG ORAL TABLET 400 MILLIGRAM(S): 241.3 TABLET ORAL at 13:08

## 2022-01-01 RX ADMIN — PANTOPRAZOLE SODIUM 40 MILLIGRAM(S): 20 TABLET, DELAYED RELEASE ORAL at 05:40

## 2022-01-01 RX ADMIN — Medication 50 MILLIGRAM(S): at 21:53

## 2022-01-01 RX ADMIN — Medication 25 GRAM(S): at 18:06

## 2022-01-01 RX ADMIN — PANTOPRAZOLE SODIUM 40 MILLIGRAM(S): 20 TABLET, DELAYED RELEASE ORAL at 05:51

## 2022-01-01 RX ADMIN — Medication 10 MILLIGRAM(S): at 05:22

## 2022-01-01 RX ADMIN — SODIUM CHLORIDE 3 MILLILITER(S): 9 INJECTION INTRAMUSCULAR; INTRAVENOUS; SUBCUTANEOUS at 20:52

## 2022-01-01 RX ADMIN — Medication 2: at 01:39

## 2022-01-01 RX ADMIN — Medication 62.5 MILLIMOLE(S): at 05:28

## 2022-01-01 RX ADMIN — Medication 5 MILLIGRAM(S): at 21:57

## 2022-01-01 RX ADMIN — MIRTAZAPINE 7.5 MILLIGRAM(S): 45 TABLET, ORALLY DISINTEGRATING ORAL at 21:28

## 2022-01-01 RX ADMIN — SODIUM CHLORIDE 1 GRAM(S): 9 INJECTION INTRAMUSCULAR; INTRAVENOUS; SUBCUTANEOUS at 06:25

## 2022-01-01 RX ADMIN — Medication 5 MILLIGRAM(S): at 15:07

## 2022-01-01 RX ADMIN — MAGNESIUM OXIDE 400 MG ORAL TABLET 400 MILLIGRAM(S): 241.3 TABLET ORAL at 12:46

## 2022-01-01 RX ADMIN — SODIUM CHLORIDE 3 MILLILITER(S): 9 INJECTION INTRAMUSCULAR; INTRAVENOUS; SUBCUTANEOUS at 14:08

## 2022-01-01 RX ADMIN — Medication 3.75 MG/MIN: at 15:06

## 2022-01-01 RX ADMIN — SODIUM CHLORIDE 1 GRAM(S): 9 INJECTION INTRAMUSCULAR; INTRAVENOUS; SUBCUTANEOUS at 15:07

## 2022-01-01 RX ADMIN — PIPERACILLIN AND TAZOBACTAM 25 GRAM(S): 4; .5 INJECTION, POWDER, LYOPHILIZED, FOR SOLUTION INTRAVENOUS at 05:37

## 2022-01-01 RX ADMIN — MEXILETINE HYDROCHLORIDE 150 MILLIGRAM(S): 150 CAPSULE ORAL at 21:27

## 2022-01-01 RX ADMIN — SENNA PLUS 2 TABLET(S): 8.6 TABLET ORAL at 21:54

## 2022-01-01 RX ADMIN — CHLORHEXIDINE GLUCONATE 1 APPLICATION(S): 213 SOLUTION TOPICAL at 07:50

## 2022-01-01 RX ADMIN — MIRTAZAPINE 7.5 MILLIGRAM(S): 45 TABLET, ORALLY DISINTEGRATING ORAL at 21:54

## 2022-01-01 RX ADMIN — SERTRALINE 100 MILLIGRAM(S): 25 TABLET, FILM COATED ORAL at 12:45

## 2022-01-01 RX ADMIN — MEXILETINE HYDROCHLORIDE 150 MILLIGRAM(S): 150 CAPSULE ORAL at 21:42

## 2022-01-01 RX ADMIN — CEFEPIME 100 MILLIGRAM(S): 1 INJECTION, POWDER, FOR SOLUTION INTRAMUSCULAR; INTRAVENOUS at 00:06

## 2022-01-01 RX ADMIN — MEROPENEM 100 MILLIGRAM(S): 1 INJECTION INTRAVENOUS at 22:19

## 2022-01-01 RX ADMIN — Medication 1 TABLET(S): at 11:12

## 2022-01-01 RX ADMIN — SPIRONOLACTONE 25 MILLIGRAM(S): 25 TABLET, FILM COATED ORAL at 08:22

## 2022-01-01 RX ADMIN — Medication 10 MILLIGRAM(S): at 17:09

## 2022-01-01 RX ADMIN — MAGNESIUM OXIDE 400 MG ORAL TABLET 400 MILLIGRAM(S): 241.3 TABLET ORAL at 17:30

## 2022-01-01 RX ADMIN — PIPERACILLIN AND TAZOBACTAM 25 GRAM(S): 4; .5 INJECTION, POWDER, LYOPHILIZED, FOR SOLUTION INTRAVENOUS at 21:12

## 2022-01-01 RX ADMIN — MEXILETINE HYDROCHLORIDE 150 MILLIGRAM(S): 150 CAPSULE ORAL at 05:57

## 2022-01-01 RX ADMIN — GABAPENTIN 100 MILLIGRAM(S): 400 CAPSULE ORAL at 17:30

## 2022-01-01 RX ADMIN — PIPERACILLIN AND TAZOBACTAM 25 GRAM(S): 4; .5 INJECTION, POWDER, LYOPHILIZED, FOR SOLUTION INTRAVENOUS at 21:32

## 2022-01-01 RX ADMIN — CHLORHEXIDINE GLUCONATE 1 APPLICATION(S): 213 SOLUTION TOPICAL at 05:26

## 2022-01-01 RX ADMIN — CHLORHEXIDINE GLUCONATE 1 APPLICATION(S): 213 SOLUTION TOPICAL at 08:56

## 2022-01-01 RX ADMIN — PANTOPRAZOLE SODIUM 40 MILLIGRAM(S): 20 TABLET, DELAYED RELEASE ORAL at 17:08

## 2022-01-01 RX ADMIN — Medication 1 TABLET(S): at 11:46

## 2022-01-01 RX ADMIN — Medication 5 MILLIGRAM(S): at 06:31

## 2022-01-01 RX ADMIN — Medication 2000 UNIT(S): at 17:24

## 2022-01-01 RX ADMIN — PIPERACILLIN AND TAZOBACTAM 25 GRAM(S): 4; .5 INJECTION, POWDER, LYOPHILIZED, FOR SOLUTION INTRAVENOUS at 05:44

## 2022-01-01 RX ADMIN — Medication 125 MILLILITER(S): at 13:39

## 2022-01-01 RX ADMIN — SENNA PLUS 2 TABLET(S): 8.6 TABLET ORAL at 21:06

## 2022-01-01 RX ADMIN — MIRTAZAPINE 7.5 MILLIGRAM(S): 45 TABLET, ORALLY DISINTEGRATING ORAL at 22:02

## 2022-01-01 RX ADMIN — Medication 1 TABLET(S): at 12:38

## 2022-01-01 RX ADMIN — Medication 5 MILLIGRAM(S): at 05:21

## 2022-01-01 RX ADMIN — Medication 25 MILLIGRAM(S): at 05:01

## 2022-01-01 RX ADMIN — MEXILETINE HYDROCHLORIDE 150 MILLIGRAM(S): 150 CAPSULE ORAL at 22:18

## 2022-01-01 RX ADMIN — Medication 500 MILLIGRAM(S): at 12:15

## 2022-01-01 RX ADMIN — Medication 1: at 00:03

## 2022-01-01 RX ADMIN — SODIUM CHLORIDE 3 MILLILITER(S): 9 INJECTION INTRAMUSCULAR; INTRAVENOUS; SUBCUTANEOUS at 16:34

## 2022-01-01 RX ADMIN — SENNA PLUS 2 TABLET(S): 8.6 TABLET ORAL at 21:42

## 2022-01-01 RX ADMIN — ENOXAPARIN SODIUM 30 MILLIGRAM(S): 100 INJECTION SUBCUTANEOUS at 12:45

## 2022-01-01 RX ADMIN — CEFEPIME 100 MILLIGRAM(S): 1 INJECTION, POWDER, FOR SOLUTION INTRAMUSCULAR; INTRAVENOUS at 17:30

## 2022-01-01 RX ADMIN — Medication 10 MILLIGRAM(S): at 06:21

## 2022-01-01 RX ADMIN — Medication 5 MILLIGRAM(S): at 21:48

## 2022-01-01 RX ADMIN — Medication 1 TABLET(S): at 12:32

## 2022-01-01 RX ADMIN — GABAPENTIN 100 MILLIGRAM(S): 400 CAPSULE ORAL at 17:25

## 2022-01-01 RX ADMIN — Medication 10 MILLIGRAM(S): at 22:49

## 2022-01-01 RX ADMIN — Medication 10 MILLIGRAM(S): at 13:13

## 2022-01-01 RX ADMIN — Medication 20 MILLIEQUIVALENT(S): at 08:22

## 2022-01-01 RX ADMIN — SODIUM CHLORIDE 3 MILLILITER(S): 9 INJECTION INTRAMUSCULAR; INTRAVENOUS; SUBCUTANEOUS at 06:50

## 2022-01-01 RX ADMIN — MEXILETINE HYDROCHLORIDE 150 MILLIGRAM(S): 150 CAPSULE ORAL at 21:13

## 2022-01-01 RX ADMIN — Medication 125 MILLIGRAM(S): at 05:11

## 2022-01-01 RX ADMIN — CHLORHEXIDINE GLUCONATE 1 APPLICATION(S): 213 SOLUTION TOPICAL at 06:24

## 2022-01-01 RX ADMIN — GABAPENTIN 100 MILLIGRAM(S): 400 CAPSULE ORAL at 21:27

## 2022-01-01 RX ADMIN — ENOXAPARIN SODIUM 30 MILLIGRAM(S): 100 INJECTION SUBCUTANEOUS at 13:07

## 2022-01-01 RX ADMIN — MEROPENEM 100 MILLIGRAM(S): 1 INJECTION INTRAVENOUS at 05:16

## 2022-01-01 RX ADMIN — SODIUM CHLORIDE 1 GRAM(S): 9 INJECTION INTRAMUSCULAR; INTRAVENOUS; SUBCUTANEOUS at 15:27

## 2022-01-01 RX ADMIN — MIRTAZAPINE 7.5 MILLIGRAM(S): 45 TABLET, ORALLY DISINTEGRATING ORAL at 21:27

## 2022-01-01 RX ADMIN — CHLORHEXIDINE GLUCONATE 1 APPLICATION(S): 213 SOLUTION TOPICAL at 08:32

## 2022-01-01 RX ADMIN — Medication 250 MILLIGRAM(S): at 06:42

## 2022-01-01 RX ADMIN — CEFEPIME 100 MILLIGRAM(S): 1 INJECTION, POWDER, FOR SOLUTION INTRAMUSCULAR; INTRAVENOUS at 04:09

## 2022-01-01 RX ADMIN — Medication 100 GRAM(S): at 06:09

## 2022-01-01 RX ADMIN — CEFEPIME 100 MILLIGRAM(S): 1 INJECTION, POWDER, FOR SOLUTION INTRAMUSCULAR; INTRAVENOUS at 00:08

## 2022-01-01 RX ADMIN — Medication 125 MILLILITER(S): at 11:30

## 2022-01-01 RX ADMIN — MAGNESIUM OXIDE 400 MG ORAL TABLET 400 MILLIGRAM(S): 241.3 TABLET ORAL at 11:37

## 2022-01-01 RX ADMIN — MEXILETINE HYDROCHLORIDE 150 MILLIGRAM(S): 150 CAPSULE ORAL at 05:45

## 2022-01-01 RX ADMIN — Medication 5 MILLIGRAM(S): at 21:21

## 2022-01-01 RX ADMIN — Medication 10 MILLIGRAM(S): at 17:18

## 2022-01-01 RX ADMIN — SPIRONOLACTONE 25 MILLIGRAM(S): 25 TABLET, FILM COATED ORAL at 08:23

## 2022-01-01 RX ADMIN — Medication 5 MILLIGRAM(S): at 21:43

## 2022-01-01 RX ADMIN — Medication 81 MILLIGRAM(S): at 12:37

## 2022-01-01 RX ADMIN — Medication 50 MILLIGRAM(S): at 21:31

## 2022-01-01 RX ADMIN — Medication 5 MILLIGRAM(S): at 20:44

## 2022-01-01 RX ADMIN — MEXILETINE HYDROCHLORIDE 150 MILLIGRAM(S): 150 CAPSULE ORAL at 05:42

## 2022-01-01 RX ADMIN — SERTRALINE 100 MILLIGRAM(S): 25 TABLET, FILM COATED ORAL at 13:43

## 2022-01-01 RX ADMIN — Medication 125 MILLIGRAM(S): at 05:22

## 2022-01-01 RX ADMIN — PIPERACILLIN AND TAZOBACTAM 25 GRAM(S): 4; .5 INJECTION, POWDER, LYOPHILIZED, FOR SOLUTION INTRAVENOUS at 22:20

## 2022-01-01 RX ADMIN — CEFEPIME 100 MILLIGRAM(S): 1 INJECTION, POWDER, FOR SOLUTION INTRAMUSCULAR; INTRAVENOUS at 14:20

## 2022-01-01 RX ADMIN — PANTOPRAZOLE SODIUM 40 MILLIGRAM(S): 20 TABLET, DELAYED RELEASE ORAL at 05:46

## 2022-01-01 RX ADMIN — CHLORHEXIDINE GLUCONATE 1 APPLICATION(S): 213 SOLUTION TOPICAL at 05:24

## 2022-01-01 RX ADMIN — Medication 25 MILLIGRAM(S): at 05:27

## 2022-01-01 RX ADMIN — SODIUM CHLORIDE 3 MILLILITER(S): 9 INJECTION INTRAMUSCULAR; INTRAVENOUS; SUBCUTANEOUS at 06:40

## 2022-01-01 RX ADMIN — SODIUM CHLORIDE 3 MILLILITER(S): 9 INJECTION INTRAMUSCULAR; INTRAVENOUS; SUBCUTANEOUS at 05:43

## 2022-01-01 RX ADMIN — Medication 1 TABLET(S): at 17:42

## 2022-01-01 RX ADMIN — MAGNESIUM OXIDE 400 MG ORAL TABLET 400 MILLIGRAM(S): 241.3 TABLET ORAL at 09:10

## 2022-01-01 RX ADMIN — Medication 1000 UNIT(S): at 12:38

## 2022-01-01 RX ADMIN — Medication 1: at 00:20

## 2022-01-01 RX ADMIN — PIPERACILLIN AND TAZOBACTAM 25 GRAM(S): 4; .5 INJECTION, POWDER, LYOPHILIZED, FOR SOLUTION INTRAVENOUS at 05:43

## 2022-01-01 RX ADMIN — MEXILETINE HYDROCHLORIDE 150 MILLIGRAM(S): 150 CAPSULE ORAL at 06:28

## 2022-01-01 RX ADMIN — Medication 81 MILLIGRAM(S): at 10:49

## 2022-01-01 RX ADMIN — SODIUM CHLORIDE 1 GRAM(S): 9 INJECTION INTRAMUSCULAR; INTRAVENOUS; SUBCUTANEOUS at 05:57

## 2022-01-01 RX ADMIN — Medication 10 MILLIGRAM(S): at 05:36

## 2022-01-01 RX ADMIN — Medication 100 MILLIGRAM(S): at 05:36

## 2022-01-01 RX ADMIN — PANTOPRAZOLE SODIUM 40 MILLIGRAM(S): 20 TABLET, DELAYED RELEASE ORAL at 20:46

## 2022-01-01 RX ADMIN — SODIUM CHLORIDE 3 MILLILITER(S): 9 INJECTION INTRAMUSCULAR; INTRAVENOUS; SUBCUTANEOUS at 05:49

## 2022-01-01 RX ADMIN — PANTOPRAZOLE SODIUM 40 MILLIGRAM(S): 20 TABLET, DELAYED RELEASE ORAL at 05:23

## 2022-01-01 RX ADMIN — MEXILETINE HYDROCHLORIDE 150 MILLIGRAM(S): 150 CAPSULE ORAL at 13:40

## 2022-01-01 RX ADMIN — Medication 10 MILLIGRAM(S): at 05:40

## 2022-01-01 RX ADMIN — SERTRALINE 100 MILLIGRAM(S): 25 TABLET, FILM COATED ORAL at 11:36

## 2022-01-01 RX ADMIN — Medication 1 TABLET(S): at 13:43

## 2022-01-01 RX ADMIN — SERTRALINE 100 MILLIGRAM(S): 25 TABLET, FILM COATED ORAL at 11:19

## 2022-01-01 RX ADMIN — SERTRALINE 100 MILLIGRAM(S): 25 TABLET, FILM COATED ORAL at 13:47

## 2022-01-01 RX ADMIN — MAGNESIUM OXIDE 400 MG ORAL TABLET 400 MILLIGRAM(S): 241.3 TABLET ORAL at 17:05

## 2022-01-01 RX ADMIN — Medication 1 TABLET(S): at 12:29

## 2022-01-01 RX ADMIN — PANTOPRAZOLE SODIUM 40 MILLIGRAM(S): 20 TABLET, DELAYED RELEASE ORAL at 13:12

## 2022-01-01 RX ADMIN — Medication 100 MILLIGRAM(S): at 11:43

## 2022-01-01 RX ADMIN — MEXILETINE HYDROCHLORIDE 150 MILLIGRAM(S): 150 CAPSULE ORAL at 05:26

## 2022-01-01 RX ADMIN — MEXILETINE HYDROCHLORIDE 150 MILLIGRAM(S): 150 CAPSULE ORAL at 21:31

## 2022-01-01 RX ADMIN — Medication 1 TABLET(S): at 11:30

## 2022-01-01 RX ADMIN — Medication 125 MILLILITER(S): at 12:51

## 2022-01-01 RX ADMIN — SERTRALINE 100 MILLIGRAM(S): 25 TABLET, FILM COATED ORAL at 13:27

## 2022-01-01 RX ADMIN — MEXILETINE HYDROCHLORIDE 150 MILLIGRAM(S): 150 CAPSULE ORAL at 13:46

## 2022-01-01 RX ADMIN — Medication 2.5 MILLIGRAM(S): at 21:13

## 2022-01-01 RX ADMIN — Medication 62.5 MILLIMOLE(S): at 09:05

## 2022-01-01 RX ADMIN — Medication 2.5 MILLIGRAM(S): at 21:40

## 2022-01-01 RX ADMIN — PANTOPRAZOLE SODIUM 40 MILLIGRAM(S): 20 TABLET, DELAYED RELEASE ORAL at 17:23

## 2022-01-01 RX ADMIN — Medication 25 MILLIGRAM(S): at 05:36

## 2022-01-01 RX ADMIN — GABAPENTIN 100 MILLIGRAM(S): 400 CAPSULE ORAL at 05:46

## 2022-01-01 RX ADMIN — CHLORHEXIDINE GLUCONATE 1 APPLICATION(S): 213 SOLUTION TOPICAL at 12:10

## 2022-01-01 RX ADMIN — CEFEPIME 100 MILLIGRAM(S): 1 INJECTION, POWDER, FOR SOLUTION INTRAMUSCULAR; INTRAVENOUS at 17:26

## 2022-01-01 RX ADMIN — Medication 5 MILLIGRAM(S): at 21:20

## 2022-01-01 RX ADMIN — MAGNESIUM OXIDE 400 MG ORAL TABLET 400 MILLIGRAM(S): 241.3 TABLET ORAL at 17:13

## 2022-01-01 RX ADMIN — Medication 1 TABLET(S): at 12:49

## 2022-01-01 RX ADMIN — MEXILETINE HYDROCHLORIDE 150 MILLIGRAM(S): 150 CAPSULE ORAL at 16:45

## 2022-01-01 RX ADMIN — MIDODRINE HYDROCHLORIDE 20 MILLIGRAM(S): 2.5 TABLET ORAL at 21:42

## 2022-01-01 RX ADMIN — MEXILETINE HYDROCHLORIDE 150 MILLIGRAM(S): 150 CAPSULE ORAL at 14:45

## 2022-01-01 RX ADMIN — MIRTAZAPINE 7.5 MILLIGRAM(S): 45 TABLET, ORALLY DISINTEGRATING ORAL at 20:58

## 2022-01-01 RX ADMIN — Medication 1000 UNIT(S): at 10:49

## 2022-01-01 RX ADMIN — PANTOPRAZOLE SODIUM 40 MILLIGRAM(S): 20 TABLET, DELAYED RELEASE ORAL at 17:30

## 2022-01-01 RX ADMIN — Medication 62.5 MILLIMOLE(S): at 08:45

## 2022-01-01 RX ADMIN — SPIRONOLACTONE 25 MILLIGRAM(S): 25 TABLET, FILM COATED ORAL at 08:59

## 2022-01-01 RX ADMIN — Medication 650 MILLIGRAM(S): at 13:08

## 2022-01-01 RX ADMIN — Medication 1 TABLET(S): at 12:46

## 2022-01-01 RX ADMIN — PANTOPRAZOLE SODIUM 40 MILLIGRAM(S): 20 TABLET, DELAYED RELEASE ORAL at 05:00

## 2022-01-01 RX ADMIN — Medication 5 MILLIGRAM(S): at 13:08

## 2022-01-01 RX ADMIN — PANTOPRAZOLE SODIUM 40 MILLIGRAM(S): 20 TABLET, DELAYED RELEASE ORAL at 18:24

## 2022-01-01 RX ADMIN — CHLORHEXIDINE GLUCONATE 1 APPLICATION(S): 213 SOLUTION TOPICAL at 08:13

## 2022-01-01 RX ADMIN — PANTOPRAZOLE SODIUM 40 MILLIGRAM(S): 20 TABLET, DELAYED RELEASE ORAL at 05:44

## 2022-01-01 RX ADMIN — PIPERACILLIN AND TAZOBACTAM 25 GRAM(S): 4; .5 INJECTION, POWDER, LYOPHILIZED, FOR SOLUTION INTRAVENOUS at 14:29

## 2022-01-01 RX ADMIN — PANTOPRAZOLE SODIUM 40 MILLIGRAM(S): 20 TABLET, DELAYED RELEASE ORAL at 05:24

## 2022-01-01 RX ADMIN — SODIUM CHLORIDE 1 GRAM(S): 9 INJECTION INTRAMUSCULAR; INTRAVENOUS; SUBCUTANEOUS at 06:22

## 2022-01-01 RX ADMIN — Medication 100 MILLIGRAM(S): at 06:02

## 2022-01-01 RX ADMIN — CHLORHEXIDINE GLUCONATE 1 APPLICATION(S): 213 SOLUTION TOPICAL at 08:23

## 2022-01-01 RX ADMIN — Medication 2.5 MILLIGRAM(S): at 17:12

## 2022-01-01 RX ADMIN — Medication 50 MILLIGRAM(S): at 21:58

## 2022-01-01 RX ADMIN — PANTOPRAZOLE SODIUM 40 MILLIGRAM(S): 20 TABLET, DELAYED RELEASE ORAL at 17:03

## 2022-01-01 RX ADMIN — PIPERACILLIN AND TAZOBACTAM 25 GRAM(S): 4; .5 INJECTION, POWDER, LYOPHILIZED, FOR SOLUTION INTRAVENOUS at 21:54

## 2022-01-01 RX ADMIN — Medication 10 MILLIGRAM(S): at 11:24

## 2022-01-01 RX ADMIN — CHLORHEXIDINE GLUCONATE 1 APPLICATION(S): 213 SOLUTION TOPICAL at 08:07

## 2022-01-01 RX ADMIN — MEXILETINE HYDROCHLORIDE 150 MILLIGRAM(S): 150 CAPSULE ORAL at 21:28

## 2022-01-01 RX ADMIN — Medication 1: at 00:48

## 2022-01-01 RX ADMIN — SENNA PLUS 2 TABLET(S): 8.6 TABLET ORAL at 22:14

## 2022-01-01 RX ADMIN — SODIUM CHLORIDE 3 MILLILITER(S): 9 INJECTION INTRAMUSCULAR; INTRAVENOUS; SUBCUTANEOUS at 20:36

## 2022-01-01 RX ADMIN — MEXILETINE HYDROCHLORIDE 150 MILLIGRAM(S): 150 CAPSULE ORAL at 21:48

## 2022-01-01 RX ADMIN — SENNA PLUS 2 TABLET(S): 8.6 TABLET ORAL at 21:09

## 2022-01-01 RX ADMIN — SODIUM ZIRCONIUM CYCLOSILICATE 10 GRAM(S): 10 POWDER, FOR SUSPENSION ORAL at 19:04

## 2022-01-01 RX ADMIN — SODIUM CHLORIDE 1000 MILLILITER(S): 9 INJECTION, SOLUTION INTRAVENOUS at 06:18

## 2022-01-01 RX ADMIN — Medication 500 MILLIGRAM(S): at 13:28

## 2022-01-01 RX ADMIN — MEXILETINE HYDROCHLORIDE 150 MILLIGRAM(S): 150 CAPSULE ORAL at 05:59

## 2022-01-01 RX ADMIN — Medication 25 MILLIGRAM(S): at 17:55

## 2022-01-01 RX ADMIN — SERTRALINE 100 MILLIGRAM(S): 25 TABLET, FILM COATED ORAL at 11:35

## 2022-01-01 RX ADMIN — Medication 5 MILLIGRAM(S): at 21:55

## 2022-01-01 RX ADMIN — MAGNESIUM OXIDE 400 MG ORAL TABLET 400 MILLIGRAM(S): 241.3 TABLET ORAL at 12:23

## 2022-01-01 RX ADMIN — Medication 5 MILLIGRAM(S): at 21:07

## 2022-01-01 RX ADMIN — SERTRALINE 100 MILLIGRAM(S): 25 TABLET, FILM COATED ORAL at 13:08

## 2022-01-01 RX ADMIN — Medication 10 MILLIGRAM(S): at 21:03

## 2022-01-01 RX ADMIN — Medication 500 MILLIGRAM(S): at 10:49

## 2022-01-01 RX ADMIN — Medication 5 MILLIGRAM(S): at 06:23

## 2022-01-01 RX ADMIN — SERTRALINE 100 MILLIGRAM(S): 25 TABLET, FILM COATED ORAL at 13:44

## 2022-01-01 RX ADMIN — CEFEPIME 100 MILLIGRAM(S): 1 INJECTION, POWDER, FOR SOLUTION INTRAMUSCULAR; INTRAVENOUS at 05:04

## 2022-01-01 RX ADMIN — Medication 125 MILLILITER(S): at 08:15

## 2022-01-01 RX ADMIN — SODIUM CHLORIDE 3 MILLILITER(S): 9 INJECTION INTRAMUSCULAR; INTRAVENOUS; SUBCUTANEOUS at 06:31

## 2022-01-01 RX ADMIN — Medication 1 TABLET(S): at 05:06

## 2022-01-01 RX ADMIN — POTASSIUM PHOSPHATE, MONOBASIC POTASSIUM PHOSPHATE, DIBASIC 62.5 MILLIMOLE(S): 236; 224 INJECTION, SOLUTION INTRAVENOUS at 02:39

## 2022-01-01 RX ADMIN — MAGNESIUM OXIDE 400 MG ORAL TABLET 400 MILLIGRAM(S): 241.3 TABLET ORAL at 17:27

## 2022-01-01 RX ADMIN — Medication 100 MILLIGRAM(S): at 05:23

## 2022-01-01 RX ADMIN — MEXILETINE HYDROCHLORIDE 150 MILLIGRAM(S): 150 CAPSULE ORAL at 06:18

## 2022-01-01 RX ADMIN — MIDODRINE HYDROCHLORIDE 20 MILLIGRAM(S): 2.5 TABLET ORAL at 05:04

## 2022-01-01 RX ADMIN — CEFEPIME 100 MILLIGRAM(S): 1 INJECTION, POWDER, FOR SOLUTION INTRAMUSCULAR; INTRAVENOUS at 17:41

## 2022-01-01 RX ADMIN — Medication 25 MILLIGRAM(S): at 05:43

## 2022-01-01 RX ADMIN — MAGNESIUM OXIDE 400 MG ORAL TABLET 400 MILLIGRAM(S): 241.3 TABLET ORAL at 08:49

## 2022-01-01 RX ADMIN — MEXILETINE HYDROCHLORIDE 150 MILLIGRAM(S): 150 CAPSULE ORAL at 13:21

## 2022-01-01 RX ADMIN — SERTRALINE 100 MILLIGRAM(S): 25 TABLET, FILM COATED ORAL at 11:13

## 2022-01-01 RX ADMIN — SODIUM CHLORIDE 1 GRAM(S): 9 INJECTION INTRAMUSCULAR; INTRAVENOUS; SUBCUTANEOUS at 22:02

## 2022-01-01 RX ADMIN — Medication 10 MILLIGRAM(S): at 22:14

## 2022-01-01 RX ADMIN — Medication 10 MILLIGRAM(S): at 21:13

## 2022-01-01 RX ADMIN — MEXILETINE HYDROCHLORIDE 150 MILLIGRAM(S): 150 CAPSULE ORAL at 06:29

## 2022-01-01 RX ADMIN — SPIRONOLACTONE 25 MILLIGRAM(S): 25 TABLET, FILM COATED ORAL at 05:17

## 2022-01-01 RX ADMIN — GABAPENTIN 100 MILLIGRAM(S): 400 CAPSULE ORAL at 05:59

## 2022-01-01 RX ADMIN — SPIRONOLACTONE 25 MILLIGRAM(S): 25 TABLET, FILM COATED ORAL at 09:39

## 2022-01-01 RX ADMIN — MEXILETINE HYDROCHLORIDE 150 MILLIGRAM(S): 150 CAPSULE ORAL at 05:09

## 2022-01-01 RX ADMIN — MEXILETINE HYDROCHLORIDE 150 MILLIGRAM(S): 150 CAPSULE ORAL at 08:54

## 2022-01-01 RX ADMIN — Medication 5 MILLIGRAM(S): at 12:47

## 2022-01-01 RX ADMIN — CHLORHEXIDINE GLUCONATE 1 APPLICATION(S): 213 SOLUTION TOPICAL at 10:41

## 2022-01-01 RX ADMIN — PANTOPRAZOLE SODIUM 40 MILLIGRAM(S): 20 TABLET, DELAYED RELEASE ORAL at 11:31

## 2022-01-01 RX ADMIN — Medication 5 MILLIGRAM(S): at 14:56

## 2022-01-01 RX ADMIN — SODIUM CHLORIDE 3 MILLILITER(S): 9 INJECTION INTRAMUSCULAR; INTRAVENOUS; SUBCUTANEOUS at 05:52

## 2022-01-01 RX ADMIN — SODIUM CHLORIDE 3 MILLILITER(S): 9 INJECTION INTRAMUSCULAR; INTRAVENOUS; SUBCUTANEOUS at 06:00

## 2022-01-01 RX ADMIN — SODIUM CHLORIDE 1 GRAM(S): 9 INJECTION INTRAMUSCULAR; INTRAVENOUS; SUBCUTANEOUS at 05:36

## 2022-01-01 RX ADMIN — CEFEPIME 100 MILLIGRAM(S): 1 INJECTION, POWDER, FOR SOLUTION INTRAMUSCULAR; INTRAVENOUS at 17:57

## 2022-01-01 RX ADMIN — Medication 10 MILLIGRAM(S): at 11:12

## 2022-01-01 RX ADMIN — Medication 2: at 17:43

## 2022-01-01 RX ADMIN — SODIUM CHLORIDE 1 GRAM(S): 9 INJECTION INTRAMUSCULAR; INTRAVENOUS; SUBCUTANEOUS at 05:43

## 2022-01-01 RX ADMIN — CHLORHEXIDINE GLUCONATE 1 APPLICATION(S): 213 SOLUTION TOPICAL at 05:04

## 2022-01-01 RX ADMIN — MEROPENEM 100 MILLIGRAM(S): 1 INJECTION INTRAVENOUS at 13:59

## 2022-01-01 RX ADMIN — Medication 5 MILLIGRAM(S): at 21:51

## 2022-01-01 RX ADMIN — SODIUM CHLORIDE 3 MILLILITER(S): 9 INJECTION INTRAMUSCULAR; INTRAVENOUS; SUBCUTANEOUS at 22:00

## 2022-01-01 RX ADMIN — Medication 25 GRAM(S): at 12:50

## 2022-01-01 RX ADMIN — MEROPENEM 100 MILLIGRAM(S): 1 INJECTION INTRAVENOUS at 05:36

## 2022-01-01 RX ADMIN — Medication 1: at 17:34

## 2022-01-01 RX ADMIN — Medication 25 MILLIGRAM(S): at 18:08

## 2022-01-01 RX ADMIN — MEXILETINE HYDROCHLORIDE 150 MILLIGRAM(S): 150 CAPSULE ORAL at 21:49

## 2022-01-01 RX ADMIN — PANTOPRAZOLE SODIUM 40 MILLIGRAM(S): 20 TABLET, DELAYED RELEASE ORAL at 05:04

## 2022-01-01 RX ADMIN — Medication 50 MILLIGRAM(S): at 06:08

## 2022-01-01 RX ADMIN — MIRTAZAPINE 7.5 MILLIGRAM(S): 45 TABLET, ORALLY DISINTEGRATING ORAL at 21:56

## 2022-01-01 RX ADMIN — SODIUM ZIRCONIUM CYCLOSILICATE 10 GRAM(S): 10 POWDER, FOR SUSPENSION ORAL at 12:11

## 2022-01-01 RX ADMIN — Medication 1 TABLET(S): at 18:24

## 2022-01-01 RX ADMIN — Medication 5 MILLIGRAM(S): at 06:16

## 2022-01-01 RX ADMIN — Medication 5 MILLIGRAM(S): at 05:07

## 2022-01-01 RX ADMIN — Medication 2000 UNIT(S): at 12:26

## 2022-01-01 RX ADMIN — PIPERACILLIN AND TAZOBACTAM 25 GRAM(S): 4; .5 INJECTION, POWDER, LYOPHILIZED, FOR SOLUTION INTRAVENOUS at 06:15

## 2022-01-01 RX ADMIN — Medication 500 MILLIGRAM(S): at 12:39

## 2022-01-01 RX ADMIN — SODIUM CHLORIDE 75 MILLILITER(S): 9 INJECTION, SOLUTION INTRAVENOUS at 11:57

## 2022-01-01 RX ADMIN — Medication 250 MILLIGRAM(S): at 06:00

## 2022-01-01 RX ADMIN — Medication 10 MILLIGRAM(S): at 21:43

## 2022-01-01 RX ADMIN — PANTOPRAZOLE SODIUM 40 MILLIGRAM(S): 20 TABLET, DELAYED RELEASE ORAL at 13:22

## 2022-01-01 RX ADMIN — Medication 2.5 MILLIGRAM(S): at 15:56

## 2022-01-01 RX ADMIN — SODIUM CHLORIDE 1 GRAM(S): 9 INJECTION INTRAMUSCULAR; INTRAVENOUS; SUBCUTANEOUS at 17:04

## 2022-01-01 RX ADMIN — SERTRALINE 100 MILLIGRAM(S): 25 TABLET, FILM COATED ORAL at 12:12

## 2022-01-01 RX ADMIN — Medication 5 MILLIGRAM(S): at 06:53

## 2022-01-01 RX ADMIN — Medication 5 MILLIGRAM(S): at 23:21

## 2022-01-01 RX ADMIN — Medication 1 TABLET(S): at 16:40

## 2022-01-01 RX ADMIN — MIDODRINE HYDROCHLORIDE 10 MILLIGRAM(S): 2.5 TABLET ORAL at 22:02

## 2022-01-01 RX ADMIN — MIRTAZAPINE 7.5 MILLIGRAM(S): 45 TABLET, ORALLY DISINTEGRATING ORAL at 21:07

## 2022-01-01 RX ADMIN — SERTRALINE 100 MILLIGRAM(S): 25 TABLET, FILM COATED ORAL at 12:47

## 2022-01-01 RX ADMIN — Medication 25 GRAM(S): at 02:02

## 2022-01-01 RX ADMIN — Medication 10 MILLIGRAM(S): at 21:32

## 2022-01-01 RX ADMIN — Medication 1: at 12:14

## 2022-01-01 RX ADMIN — Medication 1000 UNIT(S): at 11:27

## 2022-01-01 RX ADMIN — PANTOPRAZOLE SODIUM 40 MILLIGRAM(S): 20 TABLET, DELAYED RELEASE ORAL at 18:18

## 2022-01-01 RX ADMIN — Medication 1 TABLET(S): at 17:03

## 2022-01-01 RX ADMIN — CEFEPIME 100 MILLIGRAM(S): 1 INJECTION, POWDER, FOR SOLUTION INTRAMUSCULAR; INTRAVENOUS at 20:45

## 2022-01-01 RX ADMIN — PANTOPRAZOLE SODIUM 40 MILLIGRAM(S): 20 TABLET, DELAYED RELEASE ORAL at 18:04

## 2022-01-01 RX ADMIN — PANTOPRAZOLE SODIUM 40 MILLIGRAM(S): 20 TABLET, DELAYED RELEASE ORAL at 05:48

## 2022-01-01 RX ADMIN — PANTOPRAZOLE SODIUM 40 MILLIGRAM(S): 20 TABLET, DELAYED RELEASE ORAL at 17:40

## 2022-01-01 RX ADMIN — Medication 1 TABLET(S): at 20:46

## 2022-01-01 RX ADMIN — MAGNESIUM OXIDE 400 MG ORAL TABLET 400 MILLIGRAM(S): 241.3 TABLET ORAL at 17:07

## 2022-01-01 RX ADMIN — POTASSIUM PHOSPHATE, MONOBASIC POTASSIUM PHOSPHATE, DIBASIC 62.5 MILLIMOLE(S): 236; 224 INJECTION, SOLUTION INTRAVENOUS at 04:00

## 2022-01-01 RX ADMIN — PANTOPRAZOLE SODIUM 40 MILLIGRAM(S): 20 TABLET, DELAYED RELEASE ORAL at 12:10

## 2022-01-01 RX ADMIN — MIDODRINE HYDROCHLORIDE 20 MILLIGRAM(S): 2.5 TABLET ORAL at 21:49

## 2022-01-01 RX ADMIN — Medication 5 MILLIGRAM(S): at 17:05

## 2022-01-01 RX ADMIN — ENOXAPARIN SODIUM 30 MILLIGRAM(S): 100 INJECTION SUBCUTANEOUS at 12:38

## 2022-01-01 RX ADMIN — CEFEPIME 100 MILLIGRAM(S): 1 INJECTION, POWDER, FOR SOLUTION INTRAMUSCULAR; INTRAVENOUS at 00:29

## 2022-01-01 RX ADMIN — Medication 50 MILLIGRAM(S): at 06:18

## 2022-01-01 RX ADMIN — PANTOPRAZOLE SODIUM 40 MILLIGRAM(S): 20 TABLET, DELAYED RELEASE ORAL at 05:45

## 2022-01-01 RX ADMIN — MAGNESIUM OXIDE 400 MG ORAL TABLET 400 MILLIGRAM(S): 241.3 TABLET ORAL at 08:18

## 2022-01-01 RX ADMIN — MAGNESIUM OXIDE 400 MG ORAL TABLET 400 MILLIGRAM(S): 241.3 TABLET ORAL at 08:19

## 2022-01-01 RX ADMIN — Medication 10 MILLIGRAM(S): at 16:28

## 2022-01-01 RX ADMIN — Medication 1 TABLET(S): at 05:00

## 2022-01-01 RX ADMIN — Medication 5 MILLIGRAM(S): at 06:22

## 2022-01-01 RX ADMIN — MIRTAZAPINE 7.5 MILLIGRAM(S): 45 TABLET, ORALLY DISINTEGRATING ORAL at 22:48

## 2022-01-01 RX ADMIN — SODIUM CHLORIDE 1 GRAM(S): 9 INJECTION INTRAMUSCULAR; INTRAVENOUS; SUBCUTANEOUS at 05:00

## 2022-01-01 RX ADMIN — Medication 125 MILLIGRAM(S): at 06:23

## 2022-01-01 RX ADMIN — CHLORHEXIDINE GLUCONATE 1 APPLICATION(S): 213 SOLUTION TOPICAL at 08:19

## 2022-01-01 RX ADMIN — MEXILETINE HYDROCHLORIDE 150 MILLIGRAM(S): 150 CAPSULE ORAL at 05:58

## 2022-01-01 RX ADMIN — SODIUM CHLORIDE 3 MILLILITER(S): 9 INJECTION INTRAMUSCULAR; INTRAVENOUS; SUBCUTANEOUS at 22:10

## 2022-01-01 RX ADMIN — SERTRALINE 100 MILLIGRAM(S): 25 TABLET, FILM COATED ORAL at 11:47

## 2022-01-01 RX ADMIN — MEXILETINE HYDROCHLORIDE 150 MILLIGRAM(S): 150 CAPSULE ORAL at 21:22

## 2022-01-01 RX ADMIN — Medication 1: at 18:57

## 2022-01-01 RX ADMIN — SODIUM CHLORIDE 3 MILLILITER(S): 9 INJECTION INTRAMUSCULAR; INTRAVENOUS; SUBCUTANEOUS at 13:11

## 2022-01-01 RX ADMIN — SENNA PLUS 2 TABLET(S): 8.6 TABLET ORAL at 21:49

## 2022-01-01 RX ADMIN — Medication 10 MILLIGRAM(S): at 21:11

## 2022-01-01 RX ADMIN — SODIUM CHLORIDE 1 GRAM(S): 9 INJECTION INTRAMUSCULAR; INTRAVENOUS; SUBCUTANEOUS at 22:21

## 2022-01-01 RX ADMIN — SPIRONOLACTONE 25 MILLIGRAM(S): 25 TABLET, FILM COATED ORAL at 09:04

## 2022-01-01 RX ADMIN — HYDROMORPHONE HYDROCHLORIDE 0.5 MILLIGRAM(S): 2 INJECTION INTRAMUSCULAR; INTRAVENOUS; SUBCUTANEOUS at 16:33

## 2022-01-01 RX ADMIN — Medication 3 MILLIGRAM(S): at 21:25

## 2022-01-01 RX ADMIN — ENOXAPARIN SODIUM 30 MILLIGRAM(S): 100 INJECTION SUBCUTANEOUS at 11:41

## 2022-01-01 RX ADMIN — Medication 2: at 18:08

## 2022-01-01 RX ADMIN — MAGNESIUM OXIDE 400 MG ORAL TABLET 400 MILLIGRAM(S): 241.3 TABLET ORAL at 08:43

## 2022-01-01 RX ADMIN — Medication 2000 UNIT(S): at 11:32

## 2022-01-01 RX ADMIN — Medication 300 MILLIGRAM(S): at 05:05

## 2022-01-01 RX ADMIN — SODIUM CHLORIDE 250 MILLILITER(S): 9 INJECTION INTRAMUSCULAR; INTRAVENOUS; SUBCUTANEOUS at 03:30

## 2022-01-01 RX ADMIN — SERTRALINE 100 MILLIGRAM(S): 25 TABLET, FILM COATED ORAL at 11:26

## 2022-01-01 RX ADMIN — Medication 100 MILLIGRAM(S): at 07:26

## 2022-01-01 RX ADMIN — Medication 5 MILLIGRAM(S): at 17:18

## 2022-01-01 RX ADMIN — CHLORHEXIDINE GLUCONATE 1 APPLICATION(S): 213 SOLUTION TOPICAL at 11:24

## 2022-01-01 RX ADMIN — MAGNESIUM OXIDE 400 MG ORAL TABLET 400 MILLIGRAM(S): 241.3 TABLET ORAL at 17:08

## 2022-01-01 RX ADMIN — PIPERACILLIN AND TAZOBACTAM 25 GRAM(S): 4; .5 INJECTION, POWDER, LYOPHILIZED, FOR SOLUTION INTRAVENOUS at 13:12

## 2022-01-01 RX ADMIN — SODIUM CHLORIDE 3 MILLILITER(S): 9 INJECTION INTRAMUSCULAR; INTRAVENOUS; SUBCUTANEOUS at 13:21

## 2022-01-01 RX ADMIN — MIRTAZAPINE 7.5 MILLIGRAM(S): 45 TABLET, ORALLY DISINTEGRATING ORAL at 22:11

## 2022-01-01 RX ADMIN — SERTRALINE 100 MILLIGRAM(S): 25 TABLET, FILM COATED ORAL at 12:51

## 2022-01-01 RX ADMIN — MEXILETINE HYDROCHLORIDE 150 MILLIGRAM(S): 150 CAPSULE ORAL at 21:01

## 2022-01-01 RX ADMIN — SODIUM CHLORIDE 1 GRAM(S): 9 INJECTION INTRAMUSCULAR; INTRAVENOUS; SUBCUTANEOUS at 13:37

## 2022-01-01 RX ADMIN — SODIUM CHLORIDE 1 GRAM(S): 9 INJECTION INTRAMUSCULAR; INTRAVENOUS; SUBCUTANEOUS at 07:09

## 2022-01-01 RX ADMIN — HYDROMORPHONE HYDROCHLORIDE 0.2 MILLIGRAM(S): 2 INJECTION INTRAMUSCULAR; INTRAVENOUS; SUBCUTANEOUS at 15:46

## 2022-01-01 RX ADMIN — GABAPENTIN 100 MILLIGRAM(S): 400 CAPSULE ORAL at 05:37

## 2022-01-01 RX ADMIN — Medication 5 MILLIGRAM(S): at 23:04

## 2022-01-01 RX ADMIN — Medication 5 MILLIGRAM(S): at 21:29

## 2022-01-01 RX ADMIN — Medication 100 GRAM(S): at 08:23

## 2022-01-01 RX ADMIN — SERTRALINE 100 MILLIGRAM(S): 25 TABLET, FILM COATED ORAL at 12:30

## 2022-01-01 RX ADMIN — Medication 5 MILLIGRAM(S): at 06:29

## 2022-01-01 RX ADMIN — ENOXAPARIN SODIUM 30 MILLIGRAM(S): 100 INJECTION SUBCUTANEOUS at 11:34

## 2022-01-01 RX ADMIN — MAGNESIUM OXIDE 400 MG ORAL TABLET 400 MILLIGRAM(S): 241.3 TABLET ORAL at 09:02

## 2022-01-01 RX ADMIN — Medication 3 MILLIGRAM(S): at 21:54

## 2022-01-01 RX ADMIN — SPIRONOLACTONE 25 MILLIGRAM(S): 25 TABLET, FILM COATED ORAL at 08:37

## 2022-01-01 RX ADMIN — Medication 5 MILLIGRAM(S): at 21:00

## 2022-01-01 RX ADMIN — Medication 2000 UNIT(S): at 11:40

## 2022-01-01 RX ADMIN — Medication 5 MILLIGRAM(S): at 15:13

## 2022-01-01 RX ADMIN — Medication 2.5 MILLIGRAM(S): at 17:46

## 2022-01-01 RX ADMIN — MEXILETINE HYDROCHLORIDE 150 MILLIGRAM(S): 150 CAPSULE ORAL at 13:09

## 2022-01-01 RX ADMIN — SODIUM CHLORIDE 3 MILLILITER(S): 9 INJECTION INTRAMUSCULAR; INTRAVENOUS; SUBCUTANEOUS at 21:54

## 2022-01-01 RX ADMIN — VASOPRESSIN 1.2 UNIT(S)/MIN: 20 INJECTION INTRAVENOUS at 07:50

## 2022-01-01 RX ADMIN — SODIUM CHLORIDE 500 MILLILITER(S): 9 INJECTION INTRAMUSCULAR; INTRAVENOUS; SUBCUTANEOUS at 09:30

## 2022-01-01 RX ADMIN — Medication 2000 UNIT(S): at 11:48

## 2022-01-01 RX ADMIN — SPIRONOLACTONE 25 MILLIGRAM(S): 25 TABLET, FILM COATED ORAL at 09:01

## 2022-01-01 RX ADMIN — Medication 10 MILLIGRAM(S): at 05:17

## 2022-01-01 RX ADMIN — Medication 25 MILLIGRAM(S): at 05:48

## 2022-01-01 RX ADMIN — Medication 10 MILLIGRAM(S): at 15:14

## 2022-01-01 RX ADMIN — Medication 25 MILLIGRAM(S): at 04:08

## 2022-01-01 RX ADMIN — Medication 1 TABLET(S): at 11:36

## 2022-01-01 RX ADMIN — Medication 81 MILLIGRAM(S): at 11:49

## 2022-01-01 RX ADMIN — Medication 1 TABLET(S): at 11:35

## 2022-01-01 RX ADMIN — MEXILETINE HYDROCHLORIDE 150 MILLIGRAM(S): 150 CAPSULE ORAL at 13:39

## 2022-01-01 RX ADMIN — Medication 100 MILLIGRAM(S): at 11:18

## 2022-01-01 RX ADMIN — PANTOPRAZOLE SODIUM 40 MILLIGRAM(S): 20 TABLET, DELAYED RELEASE ORAL at 17:34

## 2022-01-01 RX ADMIN — Medication 50 MILLIGRAM(S): at 05:17

## 2022-01-01 RX ADMIN — Medication 5 MILLIGRAM(S): at 17:35

## 2022-01-01 RX ADMIN — CHLORHEXIDINE GLUCONATE 1 APPLICATION(S): 213 SOLUTION TOPICAL at 12:37

## 2022-01-01 RX ADMIN — PIPERACILLIN AND TAZOBACTAM 25 GRAM(S): 4; .5 INJECTION, POWDER, LYOPHILIZED, FOR SOLUTION INTRAVENOUS at 21:55

## 2022-01-01 RX ADMIN — Medication 50 MILLIGRAM(S): at 22:02

## 2022-01-01 RX ADMIN — Medication 20 MILLIEQUIVALENT(S): at 06:12

## 2022-01-01 RX ADMIN — SPIRONOLACTONE 25 MILLIGRAM(S): 25 TABLET, FILM COATED ORAL at 08:21

## 2022-01-01 RX ADMIN — SODIUM CHLORIDE 500 MILLILITER(S): 9 INJECTION INTRAMUSCULAR; INTRAVENOUS; SUBCUTANEOUS at 05:33

## 2022-01-01 RX ADMIN — Medication 10 MILLIGRAM(S): at 00:38

## 2022-01-01 RX ADMIN — Medication 3.75 MG/MIN: at 17:09

## 2022-01-01 RX ADMIN — Medication 10 MILLIGRAM(S): at 11:20

## 2022-01-01 RX ADMIN — Medication 50 MILLIGRAM(S): at 22:44

## 2022-01-01 RX ADMIN — Medication 50 MILLIGRAM(S): at 05:14

## 2022-01-01 RX ADMIN — PANTOPRAZOLE SODIUM 40 MILLIGRAM(S): 20 TABLET, DELAYED RELEASE ORAL at 05:13

## 2022-01-01 RX ADMIN — SODIUM CHLORIDE 1 GRAM(S): 9 INJECTION INTRAMUSCULAR; INTRAVENOUS; SUBCUTANEOUS at 18:09

## 2022-01-01 RX ADMIN — Medication 3 MILLIGRAM(S): at 21:08

## 2022-01-01 RX ADMIN — MEROPENEM 100 MILLIGRAM(S): 1 INJECTION INTRAVENOUS at 17:00

## 2022-01-01 RX ADMIN — Medication 500 MILLIGRAM(S): at 12:13

## 2022-01-01 RX ADMIN — MEXILETINE HYDROCHLORIDE 150 MILLIGRAM(S): 150 CAPSULE ORAL at 22:01

## 2022-01-01 RX ADMIN — CHLORHEXIDINE GLUCONATE 1 APPLICATION(S): 213 SOLUTION TOPICAL at 08:22

## 2022-01-01 RX ADMIN — MEXILETINE HYDROCHLORIDE 150 MILLIGRAM(S): 150 CAPSULE ORAL at 06:08

## 2022-01-01 RX ADMIN — Medication 2.5 MILLIGRAM(S): at 17:05

## 2022-01-01 RX ADMIN — PIPERACILLIN AND TAZOBACTAM 25 GRAM(S): 4; .5 INJECTION, POWDER, LYOPHILIZED, FOR SOLUTION INTRAVENOUS at 05:55

## 2022-01-01 RX ADMIN — SPIRONOLACTONE 25 MILLIGRAM(S): 25 TABLET, FILM COATED ORAL at 07:56

## 2022-01-01 RX ADMIN — MEXILETINE HYDROCHLORIDE 150 MILLIGRAM(S): 150 CAPSULE ORAL at 22:13

## 2022-01-01 RX ADMIN — SODIUM CHLORIDE 3 MILLILITER(S): 9 INJECTION INTRAMUSCULAR; INTRAVENOUS; SUBCUTANEOUS at 21:39

## 2022-01-01 RX ADMIN — CHLORHEXIDINE GLUCONATE 1 APPLICATION(S): 213 SOLUTION TOPICAL at 09:54

## 2022-01-01 RX ADMIN — SENNA PLUS 2 TABLET(S): 8.6 TABLET ORAL at 22:24

## 2022-01-01 RX ADMIN — Medication 50 MILLIGRAM(S): at 05:22

## 2022-01-01 RX ADMIN — HYDROMORPHONE HYDROCHLORIDE 0.2 MILLIGRAM(S): 2 INJECTION INTRAMUSCULAR; INTRAVENOUS; SUBCUTANEOUS at 22:06

## 2022-01-01 RX ADMIN — GABAPENTIN 100 MILLIGRAM(S): 400 CAPSULE ORAL at 16:41

## 2022-01-01 RX ADMIN — MEXILETINE HYDROCHLORIDE 150 MILLIGRAM(S): 150 CAPSULE ORAL at 14:59

## 2022-01-01 RX ADMIN — MEXILETINE HYDROCHLORIDE 150 MILLIGRAM(S): 150 CAPSULE ORAL at 05:24

## 2022-01-01 RX ADMIN — PANTOPRAZOLE SODIUM 40 MILLIGRAM(S): 20 TABLET, DELAYED RELEASE ORAL at 17:02

## 2022-01-01 RX ADMIN — POLYETHYLENE GLYCOL 3350 17 GRAM(S): 17 POWDER, FOR SOLUTION ORAL at 17:49

## 2022-01-01 RX ADMIN — CEFEPIME 100 MILLIGRAM(S): 1 INJECTION, POWDER, FOR SOLUTION INTRAMUSCULAR; INTRAVENOUS at 18:00

## 2022-01-01 RX ADMIN — Medication 50 MILLIGRAM(S): at 20:41

## 2022-01-01 RX ADMIN — SODIUM CHLORIDE 1 GRAM(S): 9 INJECTION INTRAMUSCULAR; INTRAVENOUS; SUBCUTANEOUS at 08:50

## 2022-01-01 RX ADMIN — Medication 50 MILLIGRAM(S): at 21:28

## 2022-01-01 RX ADMIN — MAGNESIUM OXIDE 400 MG ORAL TABLET 400 MILLIGRAM(S): 241.3 TABLET ORAL at 11:43

## 2022-01-01 RX ADMIN — SODIUM CHLORIDE 3 MILLILITER(S): 9 INJECTION INTRAMUSCULAR; INTRAVENOUS; SUBCUTANEOUS at 14:38

## 2022-01-01 RX ADMIN — MAGNESIUM OXIDE 400 MG ORAL TABLET 400 MILLIGRAM(S): 241.3 TABLET ORAL at 10:49

## 2022-01-01 RX ADMIN — Medication 5 MILLIGRAM(S): at 22:15

## 2022-01-01 RX ADMIN — VASOPRESSIN 3.6 UNIT(S)/MIN: 20 INJECTION INTRAVENOUS at 13:11

## 2022-01-01 RX ADMIN — MIRTAZAPINE 7.5 MILLIGRAM(S): 45 TABLET, ORALLY DISINTEGRATING ORAL at 21:53

## 2022-01-01 RX ADMIN — VASOPRESSIN 2.4 UNIT(S)/MIN: 20 INJECTION INTRAVENOUS at 23:00

## 2022-01-01 RX ADMIN — MEXILETINE HYDROCHLORIDE 150 MILLIGRAM(S): 150 CAPSULE ORAL at 13:33

## 2022-01-01 RX ADMIN — MIRTAZAPINE 7.5 MILLIGRAM(S): 45 TABLET, ORALLY DISINTEGRATING ORAL at 20:44

## 2022-01-01 RX ADMIN — Medication 100 MILLIGRAM(S): at 12:13

## 2022-01-01 RX ADMIN — CEFEPIME 100 MILLIGRAM(S): 1 INJECTION, POWDER, FOR SOLUTION INTRAMUSCULAR; INTRAVENOUS at 22:50

## 2022-01-01 RX ADMIN — MEXILETINE HYDROCHLORIDE 150 MILLIGRAM(S): 150 CAPSULE ORAL at 05:22

## 2022-01-01 RX ADMIN — MEXILETINE HYDROCHLORIDE 150 MILLIGRAM(S): 150 CAPSULE ORAL at 05:06

## 2022-01-01 RX ADMIN — CEFEPIME 100 MILLIGRAM(S): 1 INJECTION, POWDER, FOR SOLUTION INTRAMUSCULAR; INTRAVENOUS at 00:16

## 2022-01-01 RX ADMIN — HYDROMORPHONE HYDROCHLORIDE 0.2 MILLIGRAM(S): 2 INJECTION INTRAMUSCULAR; INTRAVENOUS; SUBCUTANEOUS at 15:15

## 2022-01-01 RX ADMIN — SODIUM ZIRCONIUM CYCLOSILICATE 10 GRAM(S): 10 POWDER, FOR SUSPENSION ORAL at 11:29

## 2022-01-01 RX ADMIN — Medication 50 MILLIGRAM(S): at 21:54

## 2022-01-01 RX ADMIN — PIPERACILLIN AND TAZOBACTAM 25 GRAM(S): 4; .5 INJECTION, POWDER, LYOPHILIZED, FOR SOLUTION INTRAVENOUS at 21:43

## 2022-01-01 RX ADMIN — SPIRONOLACTONE 25 MILLIGRAM(S): 25 TABLET, FILM COATED ORAL at 09:11

## 2022-01-01 RX ADMIN — MEXILETINE HYDROCHLORIDE 150 MILLIGRAM(S): 150 CAPSULE ORAL at 06:26

## 2022-01-01 RX ADMIN — MIDODRINE HYDROCHLORIDE 20 MILLIGRAM(S): 2.5 TABLET ORAL at 13:39

## 2022-01-01 RX ADMIN — SODIUM CHLORIDE 1 GRAM(S): 9 INJECTION INTRAMUSCULAR; INTRAVENOUS; SUBCUTANEOUS at 17:54

## 2022-01-01 RX ADMIN — ENOXAPARIN SODIUM 30 MILLIGRAM(S): 100 INJECTION SUBCUTANEOUS at 12:37

## 2022-01-01 RX ADMIN — Medication 10 MILLIGRAM(S): at 12:59

## 2022-01-01 RX ADMIN — SPIRONOLACTONE 25 MILLIGRAM(S): 25 TABLET, FILM COATED ORAL at 08:29

## 2022-01-01 RX ADMIN — Medication 50 MILLIEQUIVALENT(S): at 04:23

## 2022-01-01 RX ADMIN — Medication 50 MILLIGRAM(S): at 05:29

## 2022-01-01 RX ADMIN — MEXILETINE HYDROCHLORIDE 150 MILLIGRAM(S): 150 CAPSULE ORAL at 12:52

## 2022-01-01 RX ADMIN — SENNA PLUS 2 TABLET(S): 8.6 TABLET ORAL at 21:17

## 2022-01-01 RX ADMIN — Medication 5 MILLIGRAM(S): at 21:54

## 2022-01-01 RX ADMIN — SERTRALINE 100 MILLIGRAM(S): 25 TABLET, FILM COATED ORAL at 11:39

## 2022-01-01 RX ADMIN — Medication 1: at 11:33

## 2022-01-01 RX ADMIN — MEXILETINE HYDROCHLORIDE 150 MILLIGRAM(S): 150 CAPSULE ORAL at 21:14

## 2022-01-01 RX ADMIN — CHLORHEXIDINE GLUCONATE 1 APPLICATION(S): 213 SOLUTION TOPICAL at 05:21

## 2022-01-01 RX ADMIN — MEROPENEM 100 MILLIGRAM(S): 1 INJECTION INTRAVENOUS at 06:18

## 2022-01-01 RX ADMIN — Medication 5 MILLIGRAM(S): at 13:29

## 2022-01-01 RX ADMIN — MAGNESIUM OXIDE 400 MG ORAL TABLET 400 MILLIGRAM(S): 241.3 TABLET ORAL at 17:04

## 2022-01-01 RX ADMIN — CEFEPIME 100 MILLIGRAM(S): 1 INJECTION, POWDER, FOR SOLUTION INTRAMUSCULAR; INTRAVENOUS at 18:02

## 2022-01-01 RX ADMIN — SERTRALINE 100 MILLIGRAM(S): 25 TABLET, FILM COATED ORAL at 11:24

## 2022-01-01 RX ADMIN — Medication 1 TABLET(S): at 13:47

## 2022-01-01 RX ADMIN — Medication 5 MILLIGRAM(S): at 20:46

## 2022-01-01 RX ADMIN — SENNA PLUS 2 TABLET(S): 8.6 TABLET ORAL at 00:57

## 2022-01-01 RX ADMIN — Medication 125 MILLIGRAM(S): at 18:24

## 2022-01-01 RX ADMIN — CEFEPIME 100 MILLIGRAM(S): 1 INJECTION, POWDER, FOR SOLUTION INTRAMUSCULAR; INTRAVENOUS at 22:32

## 2022-01-01 RX ADMIN — SERTRALINE 100 MILLIGRAM(S): 25 TABLET, FILM COATED ORAL at 11:48

## 2022-01-01 RX ADMIN — MEXILETINE HYDROCHLORIDE 150 MILLIGRAM(S): 150 CAPSULE ORAL at 13:43

## 2022-01-01 RX ADMIN — Medication 1: at 11:36

## 2022-01-01 RX ADMIN — CHLORHEXIDINE GLUCONATE 1 APPLICATION(S): 213 SOLUTION TOPICAL at 04:34

## 2022-01-01 RX ADMIN — PANTOPRAZOLE SODIUM 40 MILLIGRAM(S): 20 TABLET, DELAYED RELEASE ORAL at 18:20

## 2022-01-01 RX ADMIN — SODIUM CHLORIDE 3 MILLILITER(S): 9 INJECTION INTRAMUSCULAR; INTRAVENOUS; SUBCUTANEOUS at 05:19

## 2022-01-01 RX ADMIN — SODIUM CHLORIDE 3 MILLILITER(S): 9 INJECTION INTRAMUSCULAR; INTRAVENOUS; SUBCUTANEOUS at 23:15

## 2022-01-01 RX ADMIN — Medication 1: at 00:28

## 2022-01-01 RX ADMIN — Medication 10 MILLIGRAM(S): at 22:32

## 2022-01-01 RX ADMIN — SODIUM CHLORIDE 1 GRAM(S): 9 INJECTION INTRAMUSCULAR; INTRAVENOUS; SUBCUTANEOUS at 14:56

## 2022-01-01 RX ADMIN — MEXILETINE HYDROCHLORIDE 150 MILLIGRAM(S): 150 CAPSULE ORAL at 21:56

## 2022-01-01 RX ADMIN — MAGNESIUM OXIDE 400 MG ORAL TABLET 400 MILLIGRAM(S): 241.3 TABLET ORAL at 08:55

## 2022-01-01 RX ADMIN — Medication 25 MILLIGRAM(S): at 04:56

## 2022-01-01 RX ADMIN — PANTOPRAZOLE SODIUM 40 MILLIGRAM(S): 20 TABLET, DELAYED RELEASE ORAL at 17:35

## 2022-01-01 RX ADMIN — Medication 2000 UNIT(S): at 12:03

## 2022-01-01 RX ADMIN — Medication 1 TABLET(S): at 18:02

## 2022-01-01 RX ADMIN — MEXILETINE HYDROCHLORIDE 150 MILLIGRAM(S): 150 CAPSULE ORAL at 05:04

## 2022-01-01 RX ADMIN — CEFEPIME 100 MILLIGRAM(S): 1 INJECTION, POWDER, FOR SOLUTION INTRAMUSCULAR; INTRAVENOUS at 19:04

## 2022-01-01 RX ADMIN — CEFEPIME 100 MILLIGRAM(S): 1 INJECTION, POWDER, FOR SOLUTION INTRAMUSCULAR; INTRAVENOUS at 05:15

## 2022-01-01 RX ADMIN — PANTOPRAZOLE SODIUM 40 MILLIGRAM(S): 20 TABLET, DELAYED RELEASE ORAL at 17:32

## 2022-01-01 RX ADMIN — Medication 5 MILLIGRAM(S): at 22:36

## 2022-01-01 RX ADMIN — SODIUM CHLORIDE 1 GRAM(S): 9 INJECTION INTRAMUSCULAR; INTRAVENOUS; SUBCUTANEOUS at 22:23

## 2022-01-01 RX ADMIN — Medication 5 MILLIGRAM(S): at 06:44

## 2022-01-01 RX ADMIN — MIRTAZAPINE 7.5 MILLIGRAM(S): 45 TABLET, ORALLY DISINTEGRATING ORAL at 22:19

## 2022-01-01 RX ADMIN — Medication 100 MILLIGRAM(S): at 13:20

## 2022-01-01 RX ADMIN — Medication 1000 UNIT(S): at 12:52

## 2022-01-01 RX ADMIN — Medication 100 GRAM(S): at 22:07

## 2022-01-01 RX ADMIN — SODIUM CHLORIDE 1 GRAM(S): 9 INJECTION INTRAMUSCULAR; INTRAVENOUS; SUBCUTANEOUS at 06:24

## 2022-01-01 RX ADMIN — Medication 125 MILLIGRAM(S): at 17:08

## 2022-01-01 RX ADMIN — Medication 1: at 11:27

## 2022-01-01 RX ADMIN — MIRTAZAPINE 7.5 MILLIGRAM(S): 45 TABLET, ORALLY DISINTEGRATING ORAL at 21:32

## 2022-01-01 RX ADMIN — Medication 50 MILLIGRAM(S): at 21:14

## 2022-01-01 RX ADMIN — SODIUM CHLORIDE 1 GRAM(S): 9 INJECTION INTRAMUSCULAR; INTRAVENOUS; SUBCUTANEOUS at 05:11

## 2022-01-01 RX ADMIN — Medication 100 MILLIGRAM(S): at 18:20

## 2022-01-01 RX ADMIN — GABAPENTIN 125 MILLIGRAM(S): 400 CAPSULE ORAL at 05:42

## 2022-01-01 RX ADMIN — Medication 2000 UNIT(S): at 11:36

## 2022-01-01 RX ADMIN — Medication 5 MILLIGRAM(S): at 05:19

## 2022-01-01 RX ADMIN — SENNA PLUS 2 TABLET(S): 8.6 TABLET ORAL at 21:40

## 2022-01-01 RX ADMIN — CHLORHEXIDINE GLUCONATE 1 APPLICATION(S): 213 SOLUTION TOPICAL at 06:28

## 2022-01-01 RX ADMIN — PANTOPRAZOLE SODIUM 40 MILLIGRAM(S): 20 TABLET, DELAYED RELEASE ORAL at 17:06

## 2022-01-01 RX ADMIN — MIDODRINE HYDROCHLORIDE 20 MILLIGRAM(S): 2.5 TABLET ORAL at 13:43

## 2022-01-01 RX ADMIN — Medication 25 MILLIGRAM(S): at 06:23

## 2022-01-01 RX ADMIN — MIRTAZAPINE 7.5 MILLIGRAM(S): 45 TABLET, ORALLY DISINTEGRATING ORAL at 20:42

## 2022-01-01 RX ADMIN — Medication 1: at 12:24

## 2022-01-01 RX ADMIN — MIRTAZAPINE 7.5 MILLIGRAM(S): 45 TABLET, ORALLY DISINTEGRATING ORAL at 21:19

## 2022-01-01 RX ADMIN — SODIUM CHLORIDE 1 GRAM(S): 9 INJECTION INTRAMUSCULAR; INTRAVENOUS; SUBCUTANEOUS at 05:03

## 2022-01-01 RX ADMIN — PIPERACILLIN AND TAZOBACTAM 25 GRAM(S): 4; .5 INJECTION, POWDER, LYOPHILIZED, FOR SOLUTION INTRAVENOUS at 21:39

## 2022-01-01 RX ADMIN — Medication 2000 UNIT(S): at 11:17

## 2022-01-01 RX ADMIN — SERTRALINE 100 MILLIGRAM(S): 25 TABLET, FILM COATED ORAL at 14:04

## 2022-01-01 RX ADMIN — Medication 10 MILLIGRAM(S): at 00:43

## 2022-01-01 RX ADMIN — CHLORHEXIDINE GLUCONATE 1 APPLICATION(S): 213 SOLUTION TOPICAL at 08:45

## 2022-01-01 RX ADMIN — Medication 5 MILLIGRAM(S): at 06:24

## 2022-01-01 RX ADMIN — PANTOPRAZOLE SODIUM 40 MILLIGRAM(S): 20 TABLET, DELAYED RELEASE ORAL at 17:55

## 2022-01-01 RX ADMIN — PANTOPRAZOLE SODIUM 40 MILLIGRAM(S): 20 TABLET, DELAYED RELEASE ORAL at 07:56

## 2022-01-01 RX ADMIN — VASOPRESSIN 2.4 UNIT(S)/MIN: 20 INJECTION INTRAVENOUS at 13:39

## 2022-01-01 RX ADMIN — MEXILETINE HYDROCHLORIDE 150 MILLIGRAM(S): 150 CAPSULE ORAL at 12:13

## 2022-01-01 RX ADMIN — Medication 25 MILLIGRAM(S): at 06:22

## 2022-01-01 RX ADMIN — MIDODRINE HYDROCHLORIDE 10 MILLIGRAM(S): 2.5 TABLET ORAL at 22:14

## 2022-01-01 RX ADMIN — Medication 50 MILLIGRAM(S): at 18:02

## 2022-01-01 RX ADMIN — Medication 1: at 12:55

## 2022-01-01 RX ADMIN — Medication 50 MILLIGRAM(S): at 14:50

## 2022-01-01 RX ADMIN — MAGNESIUM OXIDE 400 MG ORAL TABLET 400 MILLIGRAM(S): 241.3 TABLET ORAL at 13:15

## 2022-01-01 RX ADMIN — PIPERACILLIN AND TAZOBACTAM 25 GRAM(S): 4; .5 INJECTION, POWDER, LYOPHILIZED, FOR SOLUTION INTRAVENOUS at 12:30

## 2022-01-01 RX ADMIN — SODIUM CHLORIDE 3 MILLILITER(S): 9 INJECTION INTRAMUSCULAR; INTRAVENOUS; SUBCUTANEOUS at 15:21

## 2022-01-01 RX ADMIN — Medication 1 TABLET(S): at 05:21

## 2022-01-01 RX ADMIN — Medication 81 MILLIGRAM(S): at 11:36

## 2022-01-01 RX ADMIN — Medication 10 MILLIGRAM(S): at 18:24

## 2022-01-01 RX ADMIN — Medication 5 MILLIGRAM(S): at 21:04

## 2022-01-01 RX ADMIN — Medication 500 MILLIGRAM(S): at 13:47

## 2022-01-01 RX ADMIN — CEFEPIME 100 MILLIGRAM(S): 1 INJECTION, POWDER, FOR SOLUTION INTRAMUSCULAR; INTRAVENOUS at 05:46

## 2022-01-01 RX ADMIN — Medication 500 MILLIGRAM(S): at 12:10

## 2022-01-01 RX ADMIN — MAGNESIUM OXIDE 400 MG ORAL TABLET 400 MILLIGRAM(S): 241.3 TABLET ORAL at 17:18

## 2022-01-01 RX ADMIN — PANTOPRAZOLE SODIUM 40 MILLIGRAM(S): 20 TABLET, DELAYED RELEASE ORAL at 17:54

## 2022-01-01 RX ADMIN — Medication 5 MILLIGRAM(S): at 13:14

## 2022-01-01 RX ADMIN — MAGNESIUM OXIDE 400 MG ORAL TABLET 400 MILLIGRAM(S): 241.3 TABLET ORAL at 16:28

## 2022-01-01 RX ADMIN — CEFEPIME 100 MILLIGRAM(S): 1 INJECTION, POWDER, FOR SOLUTION INTRAMUSCULAR; INTRAVENOUS at 05:26

## 2022-01-01 RX ADMIN — Medication 2.5 MILLIGRAM(S): at 21:48

## 2022-01-01 RX ADMIN — Medication 83.33 MILLIMOLE(S): at 12:20

## 2022-01-01 RX ADMIN — Medication 5 MILLIGRAM(S): at 22:17

## 2022-01-01 RX ADMIN — PANTOPRAZOLE SODIUM 40 MILLIGRAM(S): 20 TABLET, DELAYED RELEASE ORAL at 05:02

## 2022-01-01 RX ADMIN — Medication 10 MILLIGRAM(S): at 11:48

## 2022-01-01 RX ADMIN — CEFEPIME 100 MILLIGRAM(S): 1 INJECTION, POWDER, FOR SOLUTION INTRAMUSCULAR; INTRAVENOUS at 05:57

## 2022-01-01 RX ADMIN — Medication 25 MILLIGRAM(S): at 05:03

## 2022-01-01 RX ADMIN — MEXILETINE HYDROCHLORIDE 150 MILLIGRAM(S): 150 CAPSULE ORAL at 22:14

## 2022-01-01 RX ADMIN — Medication 5 MILLIGRAM(S): at 17:13

## 2022-01-01 RX ADMIN — PIPERACILLIN AND TAZOBACTAM 25 GRAM(S): 4; .5 INJECTION, POWDER, LYOPHILIZED, FOR SOLUTION INTRAVENOUS at 06:28

## 2022-01-01 RX ADMIN — Medication 1 TABLET(S): at 12:10

## 2022-01-01 RX ADMIN — Medication 10 MILLIGRAM(S): at 13:08

## 2022-01-01 RX ADMIN — PIPERACILLIN AND TAZOBACTAM 25 GRAM(S): 4; .5 INJECTION, POWDER, LYOPHILIZED, FOR SOLUTION INTRAVENOUS at 05:05

## 2022-01-01 RX ADMIN — Medication 500 MILLIGRAM(S): at 18:16

## 2022-01-01 RX ADMIN — Medication 1 TABLET(S): at 05:16

## 2022-01-01 RX ADMIN — SODIUM CHLORIDE 3 MILLILITER(S): 9 INJECTION INTRAMUSCULAR; INTRAVENOUS; SUBCUTANEOUS at 14:30

## 2022-01-01 RX ADMIN — Medication 1 TABLET(S): at 11:44

## 2022-01-01 RX ADMIN — Medication 125 MILLIGRAM(S): at 18:23

## 2022-01-01 RX ADMIN — Medication 125 MILLIGRAM(S): at 05:28

## 2022-01-01 RX ADMIN — SODIUM CHLORIDE 1 GRAM(S): 9 INJECTION INTRAMUSCULAR; INTRAVENOUS; SUBCUTANEOUS at 15:51

## 2022-01-01 RX ADMIN — Medication 100 GRAM(S): at 18:40

## 2022-01-01 RX ADMIN — Medication 50 MILLIGRAM(S): at 21:25

## 2022-01-01 RX ADMIN — MEXILETINE HYDROCHLORIDE 150 MILLIGRAM(S): 150 CAPSULE ORAL at 14:51

## 2022-01-01 RX ADMIN — Medication 500 MILLIGRAM(S): at 11:18

## 2022-01-01 RX ADMIN — Medication 1 TABLET(S): at 16:29

## 2022-01-01 RX ADMIN — PIPERACILLIN AND TAZOBACTAM 25 GRAM(S): 4; .5 INJECTION, POWDER, LYOPHILIZED, FOR SOLUTION INTRAVENOUS at 05:20

## 2022-01-01 RX ADMIN — SODIUM CHLORIDE 3 MILLILITER(S): 9 INJECTION INTRAMUSCULAR; INTRAVENOUS; SUBCUTANEOUS at 22:02

## 2022-01-01 RX ADMIN — Medication 10 MILLIGRAM(S): at 05:44

## 2022-01-01 RX ADMIN — SPIRONOLACTONE 25 MILLIGRAM(S): 25 TABLET, FILM COATED ORAL at 08:49

## 2022-01-01 RX ADMIN — SODIUM CHLORIDE 500 MILLILITER(S): 9 INJECTION, SOLUTION INTRAVENOUS at 08:30

## 2022-01-01 RX ADMIN — GABAPENTIN 100 MILLIGRAM(S): 400 CAPSULE ORAL at 17:27

## 2022-01-01 RX ADMIN — VASOPRESSIN 3.6 UNIT(S)/MIN: 20 INJECTION INTRAVENOUS at 18:14

## 2022-01-01 RX ADMIN — MAGNESIUM OXIDE 400 MG ORAL TABLET 400 MILLIGRAM(S): 241.3 TABLET ORAL at 11:35

## 2022-01-01 RX ADMIN — MEXILETINE HYDROCHLORIDE 150 MILLIGRAM(S): 150 CAPSULE ORAL at 15:07

## 2022-01-01 RX ADMIN — SODIUM CHLORIDE 1 GRAM(S): 9 INJECTION INTRAMUSCULAR; INTRAVENOUS; SUBCUTANEOUS at 17:08

## 2022-01-01 RX ADMIN — Medication 10 MILLIGRAM(S): at 17:33

## 2022-01-01 RX ADMIN — CHLORHEXIDINE GLUCONATE 1 APPLICATION(S): 213 SOLUTION TOPICAL at 08:51

## 2022-01-01 RX ADMIN — Medication 10 MILLIGRAM(S): at 14:45

## 2022-01-01 RX ADMIN — Medication 10 MILLIGRAM(S): at 13:12

## 2022-01-01 RX ADMIN — Medication 2.5 MILLIGRAM(S): at 06:07

## 2022-01-01 RX ADMIN — PANTOPRAZOLE SODIUM 40 MILLIGRAM(S): 20 TABLET, DELAYED RELEASE ORAL at 05:07

## 2022-01-01 RX ADMIN — SODIUM CHLORIDE 1 GRAM(S): 9 INJECTION INTRAMUSCULAR; INTRAVENOUS; SUBCUTANEOUS at 05:52

## 2022-01-01 RX ADMIN — HYDROMORPHONE HYDROCHLORIDE 0.2 MILLIGRAM(S): 2 INJECTION INTRAMUSCULAR; INTRAVENOUS; SUBCUTANEOUS at 16:56

## 2022-01-01 RX ADMIN — MEXILETINE HYDROCHLORIDE 150 MILLIGRAM(S): 150 CAPSULE ORAL at 14:56

## 2022-01-01 RX ADMIN — Medication 50 MILLIGRAM(S): at 20:40

## 2022-01-01 RX ADMIN — Medication 5 MILLIGRAM(S): at 22:14

## 2022-01-01 RX ADMIN — PANTOPRAZOLE SODIUM 40 MILLIGRAM(S): 20 TABLET, DELAYED RELEASE ORAL at 17:50

## 2022-01-01 RX ADMIN — SODIUM CHLORIDE 3 MILLILITER(S): 9 INJECTION INTRAMUSCULAR; INTRAVENOUS; SUBCUTANEOUS at 13:22

## 2022-01-01 RX ADMIN — Medication 125 MILLILITER(S): at 01:30

## 2022-01-01 RX ADMIN — Medication 10 MILLIGRAM(S): at 17:06

## 2022-01-01 RX ADMIN — Medication 10 MILLIGRAM(S): at 17:14

## 2022-01-01 RX ADMIN — Medication 2000 UNIT(S): at 11:38

## 2022-01-01 RX ADMIN — Medication 10 MILLIGRAM(S): at 15:12

## 2022-01-01 RX ADMIN — PIPERACILLIN AND TAZOBACTAM 25 GRAM(S): 4; .5 INJECTION, POWDER, LYOPHILIZED, FOR SOLUTION INTRAVENOUS at 05:28

## 2022-01-01 RX ADMIN — MEXILETINE HYDROCHLORIDE 150 MILLIGRAM(S): 150 CAPSULE ORAL at 12:37

## 2022-01-01 RX ADMIN — Medication 500 MILLIGRAM(S): at 18:01

## 2022-01-01 RX ADMIN — CEFEPIME 100 MILLIGRAM(S): 1 INJECTION, POWDER, FOR SOLUTION INTRAMUSCULAR; INTRAVENOUS at 00:11

## 2022-01-01 RX ADMIN — MEXILETINE HYDROCHLORIDE 150 MILLIGRAM(S): 150 CAPSULE ORAL at 14:21

## 2022-01-01 RX ADMIN — MEROPENEM 100 MILLIGRAM(S): 1 INJECTION INTRAVENOUS at 14:53

## 2022-01-01 RX ADMIN — Medication 50 MILLIGRAM(S): at 20:20

## 2022-01-01 RX ADMIN — Medication 81 MILLIGRAM(S): at 09:51

## 2022-01-01 RX ADMIN — PIPERACILLIN AND TAZOBACTAM 25 GRAM(S): 4; .5 INJECTION, POWDER, LYOPHILIZED, FOR SOLUTION INTRAVENOUS at 06:24

## 2022-01-01 RX ADMIN — Medication 10 MILLIGRAM(S): at 05:15

## 2022-01-01 RX ADMIN — Medication 25 MILLIGRAM(S): at 09:08

## 2022-01-01 RX ADMIN — MEXILETINE HYDROCHLORIDE 150 MILLIGRAM(S): 150 CAPSULE ORAL at 06:03

## 2022-01-01 RX ADMIN — GABAPENTIN 100 MILLIGRAM(S): 400 CAPSULE ORAL at 17:14

## 2022-01-01 RX ADMIN — CEFEPIME 100 MILLIGRAM(S): 1 INJECTION, POWDER, FOR SOLUTION INTRAMUSCULAR; INTRAVENOUS at 17:36

## 2022-01-01 RX ADMIN — MEXILETINE HYDROCHLORIDE 150 MILLIGRAM(S): 150 CAPSULE ORAL at 06:21

## 2022-01-01 RX ADMIN — MAGNESIUM OXIDE 400 MG ORAL TABLET 400 MILLIGRAM(S): 241.3 TABLET ORAL at 18:12

## 2022-01-01 RX ADMIN — MIRTAZAPINE 7.5 MILLIGRAM(S): 45 TABLET, ORALLY DISINTEGRATING ORAL at 21:48

## 2022-01-01 RX ADMIN — Medication 50 MILLIGRAM(S): at 05:07

## 2022-01-01 RX ADMIN — Medication 5 MILLIGRAM(S): at 14:12

## 2022-01-01 RX ADMIN — Medication 1: at 12:08

## 2022-01-01 RX ADMIN — PIPERACILLIN AND TAZOBACTAM 25 GRAM(S): 4; .5 INJECTION, POWDER, LYOPHILIZED, FOR SOLUTION INTRAVENOUS at 13:36

## 2022-01-01 RX ADMIN — MAGNESIUM OXIDE 400 MG ORAL TABLET 400 MILLIGRAM(S): 241.3 TABLET ORAL at 12:30

## 2022-01-01 RX ADMIN — SERTRALINE 100 MILLIGRAM(S): 25 TABLET, FILM COATED ORAL at 11:32

## 2022-01-01 RX ADMIN — MEXILETINE HYDROCHLORIDE 150 MILLIGRAM(S): 150 CAPSULE ORAL at 11:48

## 2022-01-01 RX ADMIN — VASOPRESSIN 2.4 UNIT(S)/MIN: 20 INJECTION INTRAVENOUS at 00:09

## 2022-01-01 RX ADMIN — PANTOPRAZOLE SODIUM 40 MILLIGRAM(S): 20 TABLET, DELAYED RELEASE ORAL at 05:57

## 2022-01-01 RX ADMIN — VASOPRESSIN 3.6 UNIT(S)/MIN: 20 INJECTION INTRAVENOUS at 21:24

## 2022-01-01 RX ADMIN — Medication 1: at 17:26

## 2022-01-01 RX ADMIN — CEFEPIME 100 MILLIGRAM(S): 1 INJECTION, POWDER, FOR SOLUTION INTRAMUSCULAR; INTRAVENOUS at 17:54

## 2022-01-01 RX ADMIN — Medication 100 MILLIGRAM(S): at 12:26

## 2022-01-01 RX ADMIN — PANTOPRAZOLE SODIUM 40 MILLIGRAM(S): 20 TABLET, DELAYED RELEASE ORAL at 17:45

## 2022-01-01 RX ADMIN — Medication 5 MILLIGRAM(S): at 05:13

## 2022-01-01 RX ADMIN — Medication 1 TABLET(S): at 12:27

## 2022-01-01 RX ADMIN — CHLORHEXIDINE GLUCONATE 1 APPLICATION(S): 213 SOLUTION TOPICAL at 06:20

## 2022-01-01 RX ADMIN — Medication 975 MILLIGRAM(S): at 17:47

## 2022-01-01 RX ADMIN — Medication 1 TABLET(S): at 11:33

## 2022-01-01 RX ADMIN — Medication 10 MILLIGRAM(S): at 23:11

## 2022-01-01 RX ADMIN — Medication 10 MILLIGRAM(S): at 23:07

## 2022-01-01 RX ADMIN — Medication 2000 UNIT(S): at 12:51

## 2022-01-01 RX ADMIN — PIPERACILLIN AND TAZOBACTAM 25 GRAM(S): 4; .5 INJECTION, POWDER, LYOPHILIZED, FOR SOLUTION INTRAVENOUS at 13:19

## 2022-01-01 RX ADMIN — SPIRONOLACTONE 25 MILLIGRAM(S): 25 TABLET, FILM COATED ORAL at 08:50

## 2022-01-01 RX ADMIN — Medication 1 TABLET(S): at 18:04

## 2022-01-01 RX ADMIN — PANTOPRAZOLE SODIUM 40 MILLIGRAM(S): 20 TABLET, DELAYED RELEASE ORAL at 05:09

## 2022-01-01 RX ADMIN — Medication 1000 UNIT(S): at 12:30

## 2022-01-01 RX ADMIN — Medication 5 MILLIGRAM(S): at 05:11

## 2022-01-01 RX ADMIN — PANTOPRAZOLE SODIUM 40 MILLIGRAM(S): 20 TABLET, DELAYED RELEASE ORAL at 05:52

## 2022-01-01 RX ADMIN — CHLORHEXIDINE GLUCONATE 1 APPLICATION(S): 213 SOLUTION TOPICAL at 08:50

## 2022-01-01 RX ADMIN — SODIUM CHLORIDE 3 MILLILITER(S): 9 INJECTION INTRAMUSCULAR; INTRAVENOUS; SUBCUTANEOUS at 21:38

## 2022-01-01 RX ADMIN — MIDODRINE HYDROCHLORIDE 20 MILLIGRAM(S): 2.5 TABLET ORAL at 05:08

## 2022-01-01 RX ADMIN — Medication 2000 UNIT(S): at 13:38

## 2022-01-01 RX ADMIN — Medication 125 MILLIGRAM(S): at 05:08

## 2022-01-01 RX ADMIN — SPIRONOLACTONE 25 MILLIGRAM(S): 25 TABLET, FILM COATED ORAL at 08:18

## 2022-01-01 RX ADMIN — MEXILETINE HYDROCHLORIDE 150 MILLIGRAM(S): 150 CAPSULE ORAL at 22:02

## 2022-01-01 RX ADMIN — PANTOPRAZOLE SODIUM 40 MILLIGRAM(S): 20 TABLET, DELAYED RELEASE ORAL at 12:47

## 2022-01-01 RX ADMIN — SODIUM CHLORIDE 1 GRAM(S): 9 INJECTION INTRAMUSCULAR; INTRAVENOUS; SUBCUTANEOUS at 05:08

## 2022-01-01 RX ADMIN — HYDROMORPHONE HYDROCHLORIDE 0.2 MILLIGRAM(S): 2 INJECTION INTRAMUSCULAR; INTRAVENOUS; SUBCUTANEOUS at 05:17

## 2022-01-01 RX ADMIN — Medication 50 MILLIGRAM(S): at 07:55

## 2022-01-01 RX ADMIN — MEROPENEM 100 MILLIGRAM(S): 1 INJECTION INTRAVENOUS at 22:29

## 2022-01-01 RX ADMIN — Medication 5 MILLIGRAM(S): at 21:28

## 2022-01-01 RX ADMIN — Medication 5 MILLIGRAM(S): at 18:26

## 2022-01-01 RX ADMIN — Medication 10 MILLIGRAM(S): at 22:21

## 2022-01-01 RX ADMIN — MEXILETINE HYDROCHLORIDE 150 MILLIGRAM(S): 150 CAPSULE ORAL at 05:28

## 2022-01-01 RX ADMIN — Medication 125 MILLIGRAM(S): at 16:30

## 2022-01-01 RX ADMIN — Medication 50 MILLIGRAM(S): at 21:40

## 2022-01-01 RX ADMIN — Medication 1 TABLET(S): at 11:27

## 2022-01-01 RX ADMIN — SODIUM CHLORIDE 1 GRAM(S): 9 INJECTION INTRAMUSCULAR; INTRAVENOUS; SUBCUTANEOUS at 17:02

## 2022-01-01 RX ADMIN — SODIUM CHLORIDE 1 GRAM(S): 9 INJECTION INTRAMUSCULAR; INTRAVENOUS; SUBCUTANEOUS at 16:40

## 2022-01-01 RX ADMIN — Medication 5 MILLIGRAM(S): at 06:46

## 2022-01-01 RX ADMIN — SODIUM CHLORIDE 1 GRAM(S): 9 INJECTION INTRAMUSCULAR; INTRAVENOUS; SUBCUTANEOUS at 04:54

## 2022-01-01 RX ADMIN — SENNA PLUS 2 TABLET(S): 8.6 TABLET ORAL at 21:31

## 2022-01-01 RX ADMIN — Medication 1000 UNIT(S): at 12:37

## 2022-01-01 RX ADMIN — MEXILETINE HYDROCHLORIDE 150 MILLIGRAM(S): 150 CAPSULE ORAL at 15:27

## 2022-01-01 RX ADMIN — Medication 10 MILLIGRAM(S): at 14:44

## 2022-01-01 RX ADMIN — CHLORHEXIDINE GLUCONATE 1 APPLICATION(S): 213 SOLUTION TOPICAL at 07:44

## 2022-01-01 RX ADMIN — CEFEPIME 100 MILLIGRAM(S): 1 INJECTION, POWDER, FOR SOLUTION INTRAMUSCULAR; INTRAVENOUS at 05:47

## 2022-01-01 RX ADMIN — CHLORHEXIDINE GLUCONATE 1 APPLICATION(S): 213 SOLUTION TOPICAL at 08:39

## 2022-01-01 RX ADMIN — MEROPENEM 100 MILLIGRAM(S): 1 INJECTION INTRAVENOUS at 14:51

## 2022-01-01 RX ADMIN — PIPERACILLIN AND TAZOBACTAM 25 GRAM(S): 4; .5 INJECTION, POWDER, LYOPHILIZED, FOR SOLUTION INTRAVENOUS at 22:17

## 2022-01-01 RX ADMIN — SERTRALINE 100 MILLIGRAM(S): 25 TABLET, FILM COATED ORAL at 11:18

## 2022-01-01 RX ADMIN — CHLORHEXIDINE GLUCONATE 1 APPLICATION(S): 213 SOLUTION TOPICAL at 05:16

## 2022-01-01 RX ADMIN — Medication 3 MILLIGRAM(S): at 21:28

## 2022-01-01 RX ADMIN — MAGNESIUM OXIDE 400 MG ORAL TABLET 400 MILLIGRAM(S): 241.3 TABLET ORAL at 09:11

## 2022-01-01 RX ADMIN — SODIUM CHLORIDE 3 MILLILITER(S): 9 INJECTION INTRAMUSCULAR; INTRAVENOUS; SUBCUTANEOUS at 07:00

## 2022-01-01 RX ADMIN — MIDODRINE HYDROCHLORIDE 10 MILLIGRAM(S): 2.5 TABLET ORAL at 13:23

## 2022-01-01 RX ADMIN — Medication 1 TABLET(S): at 12:52

## 2022-01-01 RX ADMIN — PANTOPRAZOLE SODIUM 40 MILLIGRAM(S): 20 TABLET, DELAYED RELEASE ORAL at 16:59

## 2022-01-01 RX ADMIN — Medication 1 TABLET(S): at 17:07

## 2022-01-01 RX ADMIN — Medication 2.5 MILLIGRAM(S): at 05:18

## 2022-01-01 RX ADMIN — Medication 25 MILLIGRAM(S): at 05:18

## 2022-01-01 RX ADMIN — Medication 1000 UNIT(S): at 13:43

## 2022-01-01 RX ADMIN — Medication 5 MILLIGRAM(S): at 05:35

## 2022-01-01 RX ADMIN — PIPERACILLIN AND TAZOBACTAM 25 GRAM(S): 4; .5 INJECTION, POWDER, LYOPHILIZED, FOR SOLUTION INTRAVENOUS at 22:15

## 2022-01-01 RX ADMIN — Medication 5 MILLIGRAM(S): at 08:41

## 2022-01-01 RX ADMIN — MEXILETINE HYDROCHLORIDE 150 MILLIGRAM(S): 150 CAPSULE ORAL at 23:07

## 2022-01-01 RX ADMIN — Medication 1 TABLET(S): at 12:05

## 2022-01-01 RX ADMIN — PIPERACILLIN AND TAZOBACTAM 25 GRAM(S): 4; .5 INJECTION, POWDER, LYOPHILIZED, FOR SOLUTION INTRAVENOUS at 06:22

## 2022-01-01 RX ADMIN — MIDODRINE HYDROCHLORIDE 10 MILLIGRAM(S): 2.5 TABLET ORAL at 05:45

## 2022-01-01 RX ADMIN — SERTRALINE 100 MILLIGRAM(S): 25 TABLET, FILM COATED ORAL at 12:02

## 2022-01-01 RX ADMIN — PANTOPRAZOLE SODIUM 40 MILLIGRAM(S): 20 TABLET, DELAYED RELEASE ORAL at 06:21

## 2022-01-01 RX ADMIN — MEXILETINE HYDROCHLORIDE 150 MILLIGRAM(S): 150 CAPSULE ORAL at 10:48

## 2022-01-01 RX ADMIN — MIRTAZAPINE 7.5 MILLIGRAM(S): 45 TABLET, ORALLY DISINTEGRATING ORAL at 21:05

## 2022-01-01 RX ADMIN — MAGNESIUM OXIDE 400 MG ORAL TABLET 400 MILLIGRAM(S): 241.3 TABLET ORAL at 09:30

## 2022-01-01 RX ADMIN — Medication 5 MILLIGRAM(S): at 05:54

## 2022-01-01 RX ADMIN — Medication 1: at 09:27

## 2022-01-01 RX ADMIN — Medication 10 MILLIGRAM(S): at 05:52

## 2022-01-01 RX ADMIN — CEFEPIME 100 MILLIGRAM(S): 1 INJECTION, POWDER, FOR SOLUTION INTRAMUSCULAR; INTRAVENOUS at 05:56

## 2022-01-01 RX ADMIN — Medication 10 MILLIGRAM(S): at 12:39

## 2022-01-01 RX ADMIN — GABAPENTIN 125 MILLIGRAM(S): 400 CAPSULE ORAL at 22:02

## 2022-01-01 RX ADMIN — SERTRALINE 100 MILLIGRAM(S): 25 TABLET, FILM COATED ORAL at 11:38

## 2022-01-01 RX ADMIN — Medication 50 MILLIGRAM(S): at 05:08

## 2022-01-01 RX ADMIN — MEXILETINE HYDROCHLORIDE 150 MILLIGRAM(S): 150 CAPSULE ORAL at 13:34

## 2022-01-01 RX ADMIN — MEXILETINE HYDROCHLORIDE 150 MILLIGRAM(S): 150 CAPSULE ORAL at 21:11

## 2022-01-01 RX ADMIN — PIPERACILLIN AND TAZOBACTAM 25 GRAM(S): 4; .5 INJECTION, POWDER, LYOPHILIZED, FOR SOLUTION INTRAVENOUS at 12:45

## 2022-01-01 RX ADMIN — PANTOPRAZOLE SODIUM 40 MILLIGRAM(S): 20 TABLET, DELAYED RELEASE ORAL at 17:46

## 2022-01-01 RX ADMIN — SENNA PLUS 2 TABLET(S): 8.6 TABLET ORAL at 23:06

## 2022-01-01 RX ADMIN — Medication 125 MILLIGRAM(S): at 17:01

## 2022-01-01 RX ADMIN — Medication 50 MILLIGRAM(S): at 21:10

## 2022-01-01 RX ADMIN — Medication 7.5 MG/MIN: at 07:30

## 2022-01-01 RX ADMIN — Medication 40 MILLIEQUIVALENT(S): at 11:15

## 2022-01-01 RX ADMIN — ENOXAPARIN SODIUM 30 MILLIGRAM(S): 100 INJECTION SUBCUTANEOUS at 13:26

## 2022-01-01 RX ADMIN — Medication 5 MILLIGRAM(S): at 05:01

## 2022-01-01 RX ADMIN — SODIUM CHLORIDE 1 GRAM(S): 9 INJECTION INTRAMUSCULAR; INTRAVENOUS; SUBCUTANEOUS at 20:58

## 2022-01-01 RX ADMIN — SERTRALINE 100 MILLIGRAM(S): 25 TABLET, FILM COATED ORAL at 12:25

## 2022-01-01 RX ADMIN — SODIUM CHLORIDE 3 MILLILITER(S): 9 INJECTION INTRAMUSCULAR; INTRAVENOUS; SUBCUTANEOUS at 05:01

## 2022-01-01 RX ADMIN — Medication 1 TABLET(S): at 10:49

## 2022-01-01 RX ADMIN — MEXILETINE HYDROCHLORIDE 150 MILLIGRAM(S): 150 CAPSULE ORAL at 23:23

## 2022-01-01 RX ADMIN — ENOXAPARIN SODIUM 30 MILLIGRAM(S): 100 INJECTION SUBCUTANEOUS at 12:58

## 2022-01-01 RX ADMIN — CEFEPIME 100 MILLIGRAM(S): 1 INJECTION, POWDER, FOR SOLUTION INTRAMUSCULAR; INTRAVENOUS at 00:04

## 2022-01-01 RX ADMIN — Medication 25 MILLIGRAM(S): at 06:03

## 2022-01-01 RX ADMIN — MEXILETINE HYDROCHLORIDE 150 MILLIGRAM(S): 150 CAPSULE ORAL at 21:54

## 2022-01-01 RX ADMIN — PANTOPRAZOLE SODIUM 40 MILLIGRAM(S): 20 TABLET, DELAYED RELEASE ORAL at 05:47

## 2022-01-01 RX ADMIN — CHLORHEXIDINE GLUCONATE 1 APPLICATION(S): 213 SOLUTION TOPICAL at 09:05

## 2022-01-01 RX ADMIN — Medication 500 MILLIGRAM(S): at 11:13

## 2022-01-01 RX ADMIN — SODIUM CHLORIDE 3 MILLILITER(S): 9 INJECTION INTRAMUSCULAR; INTRAVENOUS; SUBCUTANEOUS at 22:36

## 2022-01-01 RX ADMIN — PANTOPRAZOLE SODIUM 40 MILLIGRAM(S): 20 TABLET, DELAYED RELEASE ORAL at 12:26

## 2022-01-01 RX ADMIN — CHLORHEXIDINE GLUCONATE 1 APPLICATION(S): 213 SOLUTION TOPICAL at 07:03

## 2022-01-01 RX ADMIN — Medication 25 GRAM(S): at 06:35

## 2022-01-01 RX ADMIN — Medication 10 MILLIGRAM(S): at 13:23

## 2022-01-01 RX ADMIN — PANTOPRAZOLE SODIUM 40 MILLIGRAM(S): 20 TABLET, DELAYED RELEASE ORAL at 18:23

## 2022-01-01 RX ADMIN — CHLORHEXIDINE GLUCONATE 1 APPLICATION(S): 213 SOLUTION TOPICAL at 07:55

## 2022-01-01 RX ADMIN — SODIUM CHLORIDE 3 MILLILITER(S): 9 INJECTION INTRAMUSCULAR; INTRAVENOUS; SUBCUTANEOUS at 05:56

## 2022-01-01 RX ADMIN — Medication 10 MILLIGRAM(S): at 00:39

## 2022-01-01 RX ADMIN — Medication 10 MILLIGRAM(S): at 05:07

## 2022-01-01 RX ADMIN — Medication 3.75 MG/MIN: at 22:15

## 2022-01-01 RX ADMIN — Medication 10 MILLIGRAM(S): at 05:06

## 2022-01-01 RX ADMIN — MEXILETINE HYDROCHLORIDE 150 MILLIGRAM(S): 150 CAPSULE ORAL at 05:03

## 2022-01-01 RX ADMIN — Medication 5 MILLIGRAM(S): at 09:45

## 2022-01-01 RX ADMIN — VASOPRESSIN 2.4 UNIT(S)/MIN: 20 INJECTION INTRAVENOUS at 22:15

## 2022-01-01 RX ADMIN — Medication 5 MILLIGRAM(S): at 06:00

## 2022-01-01 RX ADMIN — SENNA PLUS 2 TABLET(S): 8.6 TABLET ORAL at 21:25

## 2022-01-01 RX ADMIN — Medication 81 MILLIGRAM(S): at 12:38

## 2022-01-01 RX ADMIN — Medication 1: at 12:26

## 2022-01-01 RX ADMIN — SPIRONOLACTONE 25 MILLIGRAM(S): 25 TABLET, FILM COATED ORAL at 13:07

## 2022-01-01 RX ADMIN — Medication 125 MILLILITER(S): at 08:16

## 2022-01-01 RX ADMIN — SODIUM CHLORIDE 1 GRAM(S): 9 INJECTION INTRAMUSCULAR; INTRAVENOUS; SUBCUTANEOUS at 05:48

## 2022-01-01 RX ADMIN — MEXILETINE HYDROCHLORIDE 150 MILLIGRAM(S): 150 CAPSULE ORAL at 05:53

## 2022-01-01 RX ADMIN — MEROPENEM 100 MILLIGRAM(S): 1 INJECTION INTRAVENOUS at 05:09

## 2022-01-01 RX ADMIN — HYDROMORPHONE HYDROCHLORIDE 0.5 MILLIGRAM(S): 2 INJECTION INTRAMUSCULAR; INTRAVENOUS; SUBCUTANEOUS at 17:00

## 2022-01-01 RX ADMIN — SERTRALINE 100 MILLIGRAM(S): 25 TABLET, FILM COATED ORAL at 13:38

## 2022-01-01 RX ADMIN — CEFEPIME 100 MILLIGRAM(S): 1 INJECTION, POWDER, FOR SOLUTION INTRAMUSCULAR; INTRAVENOUS at 05:08

## 2022-01-01 RX ADMIN — Medication 50 MILLIGRAM(S): at 22:13

## 2022-01-01 RX ADMIN — CHLORHEXIDINE GLUCONATE 1 APPLICATION(S): 213 SOLUTION TOPICAL at 09:07

## 2022-01-01 RX ADMIN — Medication 5 MILLIGRAM(S): at 12:45

## 2022-01-01 RX ADMIN — SODIUM CHLORIDE 3 MILLILITER(S): 9 INJECTION INTRAMUSCULAR; INTRAVENOUS; SUBCUTANEOUS at 22:17

## 2022-01-01 RX ADMIN — SODIUM ZIRCONIUM CYCLOSILICATE 10 GRAM(S): 10 POWDER, FOR SUSPENSION ORAL at 13:39

## 2022-01-01 RX ADMIN — MAGNESIUM OXIDE 400 MG ORAL TABLET 400 MILLIGRAM(S): 241.3 TABLET ORAL at 09:04

## 2022-01-01 RX ADMIN — Medication 10 MILLIGRAM(S): at 17:41

## 2022-01-01 RX ADMIN — PANTOPRAZOLE SODIUM 40 MILLIGRAM(S): 20 TABLET, DELAYED RELEASE ORAL at 05:26

## 2022-01-01 RX ADMIN — Medication 750 MILLIGRAM(S): at 23:27

## 2022-01-01 RX ADMIN — Medication 10 MILLIGRAM(S): at 20:45

## 2022-01-01 RX ADMIN — Medication 5 MILLIGRAM(S): at 13:21

## 2022-01-01 RX ADMIN — Medication 1 TABLET(S): at 18:08

## 2022-01-01 RX ADMIN — PIPERACILLIN AND TAZOBACTAM 25 GRAM(S): 4; .5 INJECTION, POWDER, LYOPHILIZED, FOR SOLUTION INTRAVENOUS at 15:24

## 2022-01-01 RX ADMIN — MAGNESIUM OXIDE 400 MG ORAL TABLET 400 MILLIGRAM(S): 241.3 TABLET ORAL at 07:52

## 2022-01-01 RX ADMIN — Medication 10 MILLIGRAM(S): at 13:30

## 2022-01-01 RX ADMIN — MIDODRINE HYDROCHLORIDE 10 MILLIGRAM(S): 2.5 TABLET ORAL at 22:23

## 2022-01-01 RX ADMIN — Medication 25 MILLIGRAM(S): at 08:54

## 2022-01-01 RX ADMIN — Medication 1 TABLET(S): at 12:12

## 2022-01-01 RX ADMIN — MIRTAZAPINE 7.5 MILLIGRAM(S): 45 TABLET, ORALLY DISINTEGRATING ORAL at 22:13

## 2022-01-01 RX ADMIN — MEXILETINE HYDROCHLORIDE 150 MILLIGRAM(S): 150 CAPSULE ORAL at 05:18

## 2022-01-01 RX ADMIN — MIRTAZAPINE 7.5 MILLIGRAM(S): 45 TABLET, ORALLY DISINTEGRATING ORAL at 00:57

## 2022-01-01 RX ADMIN — Medication 1000 UNIT(S): at 12:44

## 2022-01-01 RX ADMIN — SODIUM CHLORIDE 3 MILLILITER(S): 9 INJECTION INTRAMUSCULAR; INTRAVENOUS; SUBCUTANEOUS at 14:59

## 2022-01-01 RX ADMIN — SODIUM CHLORIDE 3 MILLILITER(S): 9 INJECTION INTRAMUSCULAR; INTRAVENOUS; SUBCUTANEOUS at 22:15

## 2022-01-01 RX ADMIN — Medication 3 MILLIGRAM(S): at 21:58

## 2022-01-01 RX ADMIN — Medication 25 MILLIGRAM(S): at 11:29

## 2022-01-01 RX ADMIN — SPIRONOLACTONE 25 MILLIGRAM(S): 25 TABLET, FILM COATED ORAL at 09:45

## 2022-01-01 RX ADMIN — Medication 3 MILLIGRAM(S): at 22:23

## 2022-01-01 RX ADMIN — PANTOPRAZOLE SODIUM 40 MILLIGRAM(S): 20 TABLET, DELAYED RELEASE ORAL at 11:13

## 2022-01-01 RX ADMIN — PIPERACILLIN AND TAZOBACTAM 25 GRAM(S): 4; .5 INJECTION, POWDER, LYOPHILIZED, FOR SOLUTION INTRAVENOUS at 20:56

## 2022-01-01 RX ADMIN — PIPERACILLIN AND TAZOBACTAM 25 GRAM(S): 4; .5 INJECTION, POWDER, LYOPHILIZED, FOR SOLUTION INTRAVENOUS at 12:23

## 2022-01-01 RX ADMIN — PANTOPRAZOLE SODIUM 40 MILLIGRAM(S): 20 TABLET, DELAYED RELEASE ORAL at 06:32

## 2022-01-01 RX ADMIN — Medication 3 MILLIGRAM(S): at 20:58

## 2022-01-01 RX ADMIN — SODIUM ZIRCONIUM CYCLOSILICATE 10 GRAM(S): 10 POWDER, FOR SUSPENSION ORAL at 11:31

## 2022-01-01 RX ADMIN — Medication 10 MILLIGRAM(S): at 17:35

## 2022-01-01 RX ADMIN — SODIUM CHLORIDE 1 GRAM(S): 9 INJECTION INTRAMUSCULAR; INTRAVENOUS; SUBCUTANEOUS at 21:39

## 2022-01-01 RX ADMIN — SERTRALINE 100 MILLIGRAM(S): 25 TABLET, FILM COATED ORAL at 09:51

## 2022-01-01 RX ADMIN — Medication 2.5 MILLIGRAM(S): at 22:22

## 2022-01-01 RX ADMIN — Medication 100 MILLIGRAM(S): at 17:00

## 2022-01-01 RX ADMIN — Medication 1: at 06:46

## 2022-01-01 RX ADMIN — MIDODRINE HYDROCHLORIDE 20 MILLIGRAM(S): 2.5 TABLET ORAL at 05:24

## 2022-01-01 RX ADMIN — Medication 81 MILLIGRAM(S): at 13:29

## 2022-01-01 RX ADMIN — SENNA PLUS 2 TABLET(S): 8.6 TABLET ORAL at 21:04

## 2022-01-01 RX ADMIN — Medication 1: at 17:27

## 2022-01-01 RX ADMIN — SODIUM CHLORIDE 1 GRAM(S): 9 INJECTION INTRAMUSCULAR; INTRAVENOUS; SUBCUTANEOUS at 18:02

## 2022-01-01 RX ADMIN — CHLORHEXIDINE GLUCONATE 1 APPLICATION(S): 213 SOLUTION TOPICAL at 09:40

## 2022-01-01 RX ADMIN — SODIUM CHLORIDE 3 MILLILITER(S): 9 INJECTION INTRAMUSCULAR; INTRAVENOUS; SUBCUTANEOUS at 13:06

## 2022-01-01 RX ADMIN — PANTOPRAZOLE SODIUM 40 MILLIGRAM(S): 20 TABLET, DELAYED RELEASE ORAL at 05:11

## 2022-01-01 RX ADMIN — SODIUM CHLORIDE 3 MILLILITER(S): 9 INJECTION INTRAMUSCULAR; INTRAVENOUS; SUBCUTANEOUS at 13:07

## 2022-01-01 RX ADMIN — PANTOPRAZOLE SODIUM 40 MILLIGRAM(S): 20 TABLET, DELAYED RELEASE ORAL at 05:08

## 2022-01-01 RX ADMIN — Medication 25 MILLIGRAM(S): at 05:53

## 2022-01-01 RX ADMIN — PIPERACILLIN AND TAZOBACTAM 25 GRAM(S): 4; .5 INJECTION, POWDER, LYOPHILIZED, FOR SOLUTION INTRAVENOUS at 22:01

## 2022-01-01 RX ADMIN — MEXILETINE HYDROCHLORIDE 150 MILLIGRAM(S): 150 CAPSULE ORAL at 05:02

## 2022-01-01 RX ADMIN — MEXILETINE HYDROCHLORIDE 150 MILLIGRAM(S): 150 CAPSULE ORAL at 11:30

## 2022-01-01 RX ADMIN — Medication 5 MILLIGRAM(S): at 13:24

## 2022-01-01 RX ADMIN — Medication 100 GRAM(S): at 06:15

## 2022-01-01 RX ADMIN — SPIRONOLACTONE 25 MILLIGRAM(S): 25 TABLET, FILM COATED ORAL at 08:12

## 2022-01-01 RX ADMIN — Medication 1000 UNIT(S): at 11:12

## 2022-01-01 RX ADMIN — Medication 3 MILLIGRAM(S): at 21:39

## 2022-01-01 RX ADMIN — Medication 50 MILLIGRAM(S): at 17:16

## 2022-01-01 RX ADMIN — HYDROMORPHONE HYDROCHLORIDE 0.2 MILLIGRAM(S): 2 INJECTION INTRAMUSCULAR; INTRAVENOUS; SUBCUTANEOUS at 16:50

## 2022-01-01 RX ADMIN — Medication 1 TABLET(S): at 13:12

## 2022-01-01 RX ADMIN — Medication 10 MILLIGRAM(S): at 11:47

## 2022-01-01 RX ADMIN — Medication 1: at 05:27

## 2022-01-01 RX ADMIN — MIRTAZAPINE 7.5 MILLIGRAM(S): 45 TABLET, ORALLY DISINTEGRATING ORAL at 21:04

## 2022-01-01 RX ADMIN — PANTOPRAZOLE SODIUM 40 MILLIGRAM(S): 20 TABLET, DELAYED RELEASE ORAL at 05:36

## 2022-01-01 RX ADMIN — Medication 81 MILLIGRAM(S): at 11:58

## 2022-01-01 RX ADMIN — Medication 50 MILLIGRAM(S): at 22:21

## 2022-01-01 RX ADMIN — Medication 1 TABLET(S): at 12:44

## 2022-01-01 RX ADMIN — POLYETHYLENE GLYCOL 3350 17 GRAM(S): 17 POWDER, FOR SOLUTION ORAL at 11:20

## 2022-01-01 RX ADMIN — MAGNESIUM OXIDE 400 MG ORAL TABLET 400 MILLIGRAM(S): 241.3 TABLET ORAL at 12:52

## 2022-01-01 RX ADMIN — Medication 10 MILLIGRAM(S): at 10:49

## 2022-01-01 RX ADMIN — Medication 1000 UNIT(S): at 11:30

## 2022-01-01 RX ADMIN — PIPERACILLIN AND TAZOBACTAM 25 GRAM(S): 4; .5 INJECTION, POWDER, LYOPHILIZED, FOR SOLUTION INTRAVENOUS at 21:37

## 2022-01-01 RX ADMIN — MEROPENEM 100 MILLIGRAM(S): 1 INJECTION INTRAVENOUS at 17:23

## 2022-01-01 RX ADMIN — VASOPRESSIN 2.4 UNIT(S)/MIN: 20 INJECTION INTRAVENOUS at 05:31

## 2022-01-01 RX ADMIN — MIRTAZAPINE 7.5 MILLIGRAM(S): 45 TABLET, ORALLY DISINTEGRATING ORAL at 21:50

## 2022-01-01 RX ADMIN — Medication 10 MILLIGRAM(S): at 06:24

## 2022-01-01 RX ADMIN — POLYETHYLENE GLYCOL 3350 17 GRAM(S): 17 POWDER, FOR SOLUTION ORAL at 17:01

## 2022-01-01 RX ADMIN — PANTOPRAZOLE SODIUM 40 MILLIGRAM(S): 20 TABLET, DELAYED RELEASE ORAL at 05:42

## 2022-01-01 RX ADMIN — Medication 1 TABLET(S): at 11:49

## 2022-01-01 RX ADMIN — SODIUM CHLORIDE 3 MILLILITER(S): 9 INJECTION INTRAMUSCULAR; INTRAVENOUS; SUBCUTANEOUS at 22:55

## 2022-01-01 RX ADMIN — Medication 25 MILLIGRAM(S): at 05:12

## 2022-01-01 RX ADMIN — Medication 1000 UNIT(S): at 11:43

## 2022-01-01 RX ADMIN — MAGNESIUM OXIDE 400 MG ORAL TABLET 400 MILLIGRAM(S): 241.3 TABLET ORAL at 11:46

## 2022-01-01 RX ADMIN — Medication 100 MILLIGRAM(S): at 18:07

## 2022-01-01 RX ADMIN — SERTRALINE 100 MILLIGRAM(S): 25 TABLET, FILM COATED ORAL at 11:59

## 2022-01-01 RX ADMIN — GABAPENTIN 100 MILLIGRAM(S): 400 CAPSULE ORAL at 05:54

## 2022-01-01 RX ADMIN — CHLORHEXIDINE GLUCONATE 1 APPLICATION(S): 213 SOLUTION TOPICAL at 05:05

## 2022-01-01 RX ADMIN — Medication 81 MILLIGRAM(S): at 13:27

## 2022-01-01 RX ADMIN — Medication 2.5 MILLIGRAM(S): at 17:30

## 2022-01-01 RX ADMIN — MAGNESIUM OXIDE 400 MG ORAL TABLET 400 MILLIGRAM(S): 241.3 TABLET ORAL at 09:45

## 2022-01-01 RX ADMIN — CHLORHEXIDINE GLUCONATE 1 APPLICATION(S): 213 SOLUTION TOPICAL at 07:19

## 2022-01-01 RX ADMIN — Medication 125 MILLIGRAM(S): at 17:33

## 2022-01-01 RX ADMIN — SODIUM CHLORIDE 3 MILLILITER(S): 9 INJECTION INTRAMUSCULAR; INTRAVENOUS; SUBCUTANEOUS at 22:41

## 2022-01-01 RX ADMIN — MEXILETINE HYDROCHLORIDE 150 MILLIGRAM(S): 150 CAPSULE ORAL at 22:23

## 2022-01-01 RX ADMIN — Medication 50 MILLIGRAM(S): at 21:50

## 2022-01-01 RX ADMIN — Medication 25 GRAM(S): at 06:05

## 2022-01-01 RX ADMIN — PIPERACILLIN AND TAZOBACTAM 25 GRAM(S): 4; .5 INJECTION, POWDER, LYOPHILIZED, FOR SOLUTION INTRAVENOUS at 21:08

## 2022-01-01 RX ADMIN — Medication 1 TABLET(S): at 05:08

## 2022-01-01 RX ADMIN — SPIRONOLACTONE 25 MILLIGRAM(S): 25 TABLET, FILM COATED ORAL at 09:20

## 2022-01-01 RX ADMIN — SODIUM ZIRCONIUM CYCLOSILICATE 10 GRAM(S): 10 POWDER, FOR SUSPENSION ORAL at 11:13

## 2022-01-01 RX ADMIN — Medication 300 MILLIGRAM(S): at 23:26

## 2022-01-01 RX ADMIN — MEROPENEM 100 MILLIGRAM(S): 1 INJECTION INTRAVENOUS at 05:28

## 2022-01-01 RX ADMIN — Medication 1000 UNIT(S): at 13:12

## 2022-01-01 RX ADMIN — PIPERACILLIN AND TAZOBACTAM 25 GRAM(S): 4; .5 INJECTION, POWDER, LYOPHILIZED, FOR SOLUTION INTRAVENOUS at 08:54

## 2022-01-01 RX ADMIN — Medication 1 TABLET(S): at 13:28

## 2022-01-01 RX ADMIN — Medication 1 TABLET(S): at 11:42

## 2022-01-01 RX ADMIN — GABAPENTIN 100 MILLIGRAM(S): 400 CAPSULE ORAL at 05:13

## 2022-01-01 RX ADMIN — Medication 10 MILLIGRAM(S): at 05:42

## 2022-01-01 RX ADMIN — CEFEPIME 100 MILLIGRAM(S): 1 INJECTION, POWDER, FOR SOLUTION INTRAMUSCULAR; INTRAVENOUS at 05:21

## 2022-01-01 RX ADMIN — MEXILETINE HYDROCHLORIDE 150 MILLIGRAM(S): 150 CAPSULE ORAL at 22:21

## 2022-01-01 RX ADMIN — PIPERACILLIN AND TAZOBACTAM 25 GRAM(S): 4; .5 INJECTION, POWDER, LYOPHILIZED, FOR SOLUTION INTRAVENOUS at 05:51

## 2022-01-01 RX ADMIN — PANTOPRAZOLE SODIUM 40 MILLIGRAM(S): 20 TABLET, DELAYED RELEASE ORAL at 17:09

## 2022-01-01 RX ADMIN — Medication 1000 UNIT(S): at 11:46

## 2022-01-01 RX ADMIN — SODIUM CHLORIDE 3 MILLILITER(S): 9 INJECTION INTRAMUSCULAR; INTRAVENOUS; SUBCUTANEOUS at 05:34

## 2022-01-01 RX ADMIN — Medication 10 MILLIGRAM(S): at 13:45

## 2022-01-01 RX ADMIN — MIRTAZAPINE 7.5 MILLIGRAM(S): 45 TABLET, ORALLY DISINTEGRATING ORAL at 21:15

## 2022-01-01 RX ADMIN — SERTRALINE 100 MILLIGRAM(S): 25 TABLET, FILM COATED ORAL at 11:11

## 2022-01-01 RX ADMIN — Medication 2000 UNIT(S): at 12:15

## 2022-01-01 RX ADMIN — PANTOPRAZOLE SODIUM 40 MILLIGRAM(S): 20 TABLET, DELAYED RELEASE ORAL at 06:08

## 2022-01-01 RX ADMIN — Medication 1000 UNIT(S): at 11:48

## 2022-01-01 RX ADMIN — MEXILETINE HYDROCHLORIDE 150 MILLIGRAM(S): 150 CAPSULE ORAL at 09:21

## 2022-01-01 RX ADMIN — SODIUM CHLORIDE 100 MILLILITER(S): 9 INJECTION INTRAMUSCULAR; INTRAVENOUS; SUBCUTANEOUS at 09:47

## 2022-01-01 RX ADMIN — Medication 1: at 12:44

## 2022-01-01 RX ADMIN — Medication 1000 UNIT(S): at 12:46

## 2022-01-01 RX ADMIN — MEXILETINE HYDROCHLORIDE 150 MILLIGRAM(S): 150 CAPSULE ORAL at 15:40

## 2022-01-01 RX ADMIN — MEXILETINE HYDROCHLORIDE 150 MILLIGRAM(S): 150 CAPSULE ORAL at 08:48

## 2022-01-01 RX ADMIN — Medication 100 MILLIGRAM(S): at 11:37

## 2022-01-01 RX ADMIN — Medication 50 MILLIGRAM(S): at 05:12

## 2022-01-01 RX ADMIN — MIRTAZAPINE 7.5 MILLIGRAM(S): 45 TABLET, ORALLY DISINTEGRATING ORAL at 21:58

## 2022-01-01 RX ADMIN — GABAPENTIN 100 MILLIGRAM(S): 400 CAPSULE ORAL at 13:11

## 2022-01-01 RX ADMIN — Medication 10 MILLIGRAM(S): at 05:04

## 2022-01-01 RX ADMIN — MAGNESIUM OXIDE 400 MG ORAL TABLET 400 MILLIGRAM(S): 241.3 TABLET ORAL at 17:09

## 2022-01-01 RX ADMIN — Medication 5 MILLIGRAM(S): at 06:26

## 2022-01-01 RX ADMIN — CEFEPIME 100 MILLIGRAM(S): 1 INJECTION, POWDER, FOR SOLUTION INTRAMUSCULAR; INTRAVENOUS at 13:45

## 2022-01-01 RX ADMIN — MEROPENEM 100 MILLIGRAM(S): 1 INJECTION INTRAVENOUS at 21:14

## 2022-01-01 RX ADMIN — PANTOPRAZOLE SODIUM 40 MILLIGRAM(S): 20 TABLET, DELAYED RELEASE ORAL at 16:28

## 2022-01-01 RX ADMIN — SODIUM CHLORIDE 3 MILLILITER(S): 9 INJECTION INTRAMUSCULAR; INTRAVENOUS; SUBCUTANEOUS at 05:00

## 2022-01-01 RX ADMIN — SODIUM CHLORIDE 1000 MILLILITER(S): 9 INJECTION, SOLUTION INTRAVENOUS at 02:02

## 2022-01-01 RX ADMIN — SODIUM CHLORIDE 500 MILLILITER(S): 9 INJECTION INTRAMUSCULAR; INTRAVENOUS; SUBCUTANEOUS at 00:15

## 2022-01-01 RX ADMIN — Medication 1000 UNIT(S): at 12:47

## 2022-01-01 RX ADMIN — PANTOPRAZOLE SODIUM 40 MILLIGRAM(S): 20 TABLET, DELAYED RELEASE ORAL at 17:18

## 2022-01-01 RX ADMIN — SODIUM CHLORIDE 3 MILLILITER(S): 9 INJECTION INTRAMUSCULAR; INTRAVENOUS; SUBCUTANEOUS at 20:48

## 2022-01-01 RX ADMIN — Medication 5 MILLIGRAM(S): at 05:12

## 2022-01-01 RX ADMIN — Medication 50 MILLIGRAM(S): at 22:19

## 2022-01-01 RX ADMIN — Medication 100 MILLIGRAM(S): at 11:32

## 2022-01-01 RX ADMIN — SPIRONOLACTONE 25 MILLIGRAM(S): 25 TABLET, FILM COATED ORAL at 08:55

## 2022-01-01 RX ADMIN — CEFEPIME 100 MILLIGRAM(S): 1 INJECTION, POWDER, FOR SOLUTION INTRAMUSCULAR; INTRAVENOUS at 06:11

## 2022-01-01 RX ADMIN — GABAPENTIN 100 MILLIGRAM(S): 400 CAPSULE ORAL at 05:44

## 2022-01-01 RX ADMIN — MEXILETINE HYDROCHLORIDE 150 MILLIGRAM(S): 150 CAPSULE ORAL at 21:50

## 2022-01-01 RX ADMIN — PANTOPRAZOLE SODIUM 40 MILLIGRAM(S): 20 TABLET, DELAYED RELEASE ORAL at 18:56

## 2022-01-01 RX ADMIN — Medication 1 TABLET(S): at 11:40

## 2022-01-01 RX ADMIN — Medication 10 MILLIGRAM(S): at 21:05

## 2022-01-01 RX ADMIN — MAGNESIUM OXIDE 400 MG ORAL TABLET 400 MILLIGRAM(S): 241.3 TABLET ORAL at 08:37

## 2022-01-01 RX ADMIN — Medication 1 TABLET(S): at 17:55

## 2022-01-01 RX ADMIN — SERTRALINE 100 MILLIGRAM(S): 25 TABLET, FILM COATED ORAL at 13:10

## 2022-01-01 RX ADMIN — SODIUM CHLORIDE 3 MILLILITER(S): 9 INJECTION INTRAMUSCULAR; INTRAVENOUS; SUBCUTANEOUS at 12:41

## 2022-01-01 RX ADMIN — POLYETHYLENE GLYCOL 3350 17 GRAM(S): 17 POWDER, FOR SOLUTION ORAL at 05:02

## 2022-01-01 RX ADMIN — Medication 5 MILLIGRAM(S): at 22:11

## 2022-01-01 RX ADMIN — Medication 81 MILLIGRAM(S): at 12:47

## 2022-01-01 RX ADMIN — Medication 10 MILLIGRAM(S): at 00:30

## 2022-01-01 RX ADMIN — SENNA PLUS 2 TABLET(S): 8.6 TABLET ORAL at 20:20

## 2022-01-01 RX ADMIN — MAGNESIUM OXIDE 400 MG ORAL TABLET 400 MILLIGRAM(S): 241.3 TABLET ORAL at 17:39

## 2022-01-01 RX ADMIN — SODIUM CHLORIDE 1 GRAM(S): 9 INJECTION INTRAMUSCULAR; INTRAVENOUS; SUBCUTANEOUS at 05:05

## 2022-01-01 RX ADMIN — PIPERACILLIN AND TAZOBACTAM 25 GRAM(S): 4; .5 INJECTION, POWDER, LYOPHILIZED, FOR SOLUTION INTRAVENOUS at 12:52

## 2022-01-01 RX ADMIN — Medication 2: at 05:49

## 2022-01-01 RX ADMIN — HYDROMORPHONE HYDROCHLORIDE 0.2 MILLIGRAM(S): 2 INJECTION INTRAMUSCULAR; INTRAVENOUS; SUBCUTANEOUS at 06:17

## 2022-01-01 RX ADMIN — MAGNESIUM OXIDE 400 MG ORAL TABLET 400 MILLIGRAM(S): 241.3 TABLET ORAL at 12:44

## 2022-01-01 RX ADMIN — MAGNESIUM OXIDE 400 MG ORAL TABLET 400 MILLIGRAM(S): 241.3 TABLET ORAL at 08:59

## 2022-01-01 RX ADMIN — PANTOPRAZOLE SODIUM 40 MILLIGRAM(S): 20 TABLET, DELAYED RELEASE ORAL at 06:24

## 2022-01-01 RX ADMIN — Medication 1: at 18:46

## 2022-01-01 RX ADMIN — Medication 10 MILLIGRAM(S): at 05:28

## 2022-01-01 RX ADMIN — Medication 750 MILLIGRAM(S): at 05:20

## 2022-01-01 RX ADMIN — Medication 81 MILLIGRAM(S): at 13:44

## 2022-01-01 RX ADMIN — Medication 3 MILLIGRAM(S): at 22:19

## 2022-01-01 RX ADMIN — Medication 10 MILLIGRAM(S): at 05:48

## 2022-01-01 RX ADMIN — PANTOPRAZOLE SODIUM 40 MILLIGRAM(S): 20 TABLET, DELAYED RELEASE ORAL at 17:38

## 2022-01-01 RX ADMIN — Medication 50 MILLIGRAM(S): at 05:09

## 2022-01-01 RX ADMIN — Medication 10 MILLIGRAM(S): at 13:19

## 2022-01-01 RX ADMIN — CEFEPIME 100 MILLIGRAM(S): 1 INJECTION, POWDER, FOR SOLUTION INTRAMUSCULAR; INTRAVENOUS at 05:45

## 2022-01-01 RX ADMIN — SODIUM CHLORIDE 250 MILLILITER(S): 9 INJECTION INTRAMUSCULAR; INTRAVENOUS; SUBCUTANEOUS at 07:39

## 2022-01-01 RX ADMIN — MAGNESIUM OXIDE 400 MG ORAL TABLET 400 MILLIGRAM(S): 241.3 TABLET ORAL at 08:12

## 2022-01-01 RX ADMIN — GABAPENTIN 100 MILLIGRAM(S): 400 CAPSULE ORAL at 05:26

## 2022-01-01 RX ADMIN — MEXILETINE HYDROCHLORIDE 150 MILLIGRAM(S): 150 CAPSULE ORAL at 13:13

## 2022-01-01 RX ADMIN — MEXILETINE HYDROCHLORIDE 150 MILLIGRAM(S): 150 CAPSULE ORAL at 05:13

## 2022-01-01 RX ADMIN — VASOPRESSIN 6 UNIT(S)/MIN: 20 INJECTION INTRAVENOUS at 11:13

## 2022-01-01 RX ADMIN — Medication 50 MILLIGRAM(S): at 22:23

## 2022-01-01 RX ADMIN — SODIUM CHLORIDE 1 GRAM(S): 9 INJECTION INTRAMUSCULAR; INTRAVENOUS; SUBCUTANEOUS at 11:15

## 2022-01-01 RX ADMIN — SERTRALINE 100 MILLIGRAM(S): 25 TABLET, FILM COATED ORAL at 09:03

## 2022-01-01 RX ADMIN — Medication 10 MILLIGRAM(S): at 21:18

## 2022-01-01 RX ADMIN — Medication 300 MILLIGRAM(S): at 10:10

## 2022-01-01 RX ADMIN — SODIUM CHLORIDE 3 MILLILITER(S): 9 INJECTION INTRAMUSCULAR; INTRAVENOUS; SUBCUTANEOUS at 07:13

## 2022-01-01 RX ADMIN — Medication 1 TABLET(S): at 05:48

## 2022-01-01 RX ADMIN — MAGNESIUM OXIDE 400 MG ORAL TABLET 400 MILLIGRAM(S): 241.3 TABLET ORAL at 13:13

## 2022-01-01 RX ADMIN — Medication 50 MILLIGRAM(S): at 21:46

## 2022-01-01 RX ADMIN — Medication 5 MILLIGRAM(S): at 22:02

## 2022-01-01 RX ADMIN — Medication 2000 UNIT(S): at 11:35

## 2022-01-01 RX ADMIN — GABAPENTIN 100 MILLIGRAM(S): 400 CAPSULE ORAL at 05:03

## 2022-01-01 RX ADMIN — MEXILETINE HYDROCHLORIDE 150 MILLIGRAM(S): 150 CAPSULE ORAL at 21:04

## 2022-01-01 RX ADMIN — Medication 1000 UNIT(S): at 13:13

## 2022-01-01 RX ADMIN — Medication 3.75 MG/MIN: at 08:09

## 2022-01-01 RX ADMIN — SENNA PLUS 2 TABLET(S): 8.6 TABLET ORAL at 21:01

## 2022-01-01 RX ADMIN — SERTRALINE 100 MILLIGRAM(S): 25 TABLET, FILM COATED ORAL at 13:28

## 2022-01-01 RX ADMIN — MEXILETINE HYDROCHLORIDE 150 MILLIGRAM(S): 150 CAPSULE ORAL at 21:26

## 2022-01-01 RX ADMIN — CEFEPIME 100 MILLIGRAM(S): 1 INJECTION, POWDER, FOR SOLUTION INTRAMUSCULAR; INTRAVENOUS at 21:21

## 2022-01-01 RX ADMIN — Medication 1: at 05:16

## 2022-01-01 RX ADMIN — MAGNESIUM OXIDE 400 MG ORAL TABLET 400 MILLIGRAM(S): 241.3 TABLET ORAL at 08:50

## 2022-01-01 RX ADMIN — PANTOPRAZOLE SODIUM 40 MILLIGRAM(S): 20 TABLET, DELAYED RELEASE ORAL at 05:30

## 2022-01-01 RX ADMIN — Medication 2.5 MILLIGRAM(S): at 08:59

## 2022-01-01 RX ADMIN — SODIUM CHLORIDE 3 MILLILITER(S): 9 INJECTION INTRAMUSCULAR; INTRAVENOUS; SUBCUTANEOUS at 21:29

## 2022-01-01 RX ADMIN — Medication 1000 UNIT(S): at 12:05

## 2022-01-01 RX ADMIN — CEFEPIME 100 MILLIGRAM(S): 1 INJECTION, POWDER, FOR SOLUTION INTRAMUSCULAR; INTRAVENOUS at 13:00

## 2022-01-01 RX ADMIN — Medication 1 TABLET(S): at 11:23

## 2022-01-01 RX ADMIN — MEXILETINE HYDROCHLORIDE 150 MILLIGRAM(S): 150 CAPSULE ORAL at 14:49

## 2022-01-01 RX ADMIN — Medication 5 MILLIGRAM(S): at 05:42

## 2022-01-01 RX ADMIN — PANTOPRAZOLE SODIUM 40 MILLIGRAM(S): 20 TABLET, DELAYED RELEASE ORAL at 17:43

## 2022-01-01 RX ADMIN — PIPERACILLIN AND TAZOBACTAM 25 GRAM(S): 4; .5 INJECTION, POWDER, LYOPHILIZED, FOR SOLUTION INTRAVENOUS at 21:29

## 2022-01-01 RX ADMIN — MIRTAZAPINE 7.5 MILLIGRAM(S): 45 TABLET, ORALLY DISINTEGRATING ORAL at 21:44

## 2022-01-01 RX ADMIN — CHLORHEXIDINE GLUCONATE 1 APPLICATION(S): 213 SOLUTION TOPICAL at 12:22

## 2022-01-01 RX ADMIN — ENOXAPARIN SODIUM 30 MILLIGRAM(S): 100 INJECTION SUBCUTANEOUS at 12:01

## 2022-01-01 RX ADMIN — Medication 25 MILLIGRAM(S): at 11:12

## 2022-01-01 RX ADMIN — Medication 10 MILLIGRAM(S): at 12:23

## 2022-01-01 RX ADMIN — SODIUM CHLORIDE 3 MILLILITER(S): 9 INJECTION INTRAMUSCULAR; INTRAVENOUS; SUBCUTANEOUS at 05:07

## 2022-01-01 RX ADMIN — Medication 25 MILLIGRAM(S): at 05:42

## 2022-01-01 RX ADMIN — Medication 10 MILLIGRAM(S): at 05:45

## 2022-01-01 RX ADMIN — Medication 10 MILLIGRAM(S): at 21:17

## 2022-01-01 RX ADMIN — SERTRALINE 100 MILLIGRAM(S): 25 TABLET, FILM COATED ORAL at 13:20

## 2022-01-01 RX ADMIN — Medication 1: at 12:58

## 2022-01-01 RX ADMIN — MIDODRINE HYDROCHLORIDE 10 MILLIGRAM(S): 2.5 TABLET ORAL at 05:22

## 2022-01-01 RX ADMIN — Medication 5 MILLIGRAM(S): at 05:10

## 2022-01-01 RX ADMIN — SENNA PLUS 2 TABLET(S): 8.6 TABLET ORAL at 21:57

## 2022-01-01 RX ADMIN — MEXILETINE HYDROCHLORIDE 150 MILLIGRAM(S): 150 CAPSULE ORAL at 04:08

## 2022-01-01 RX ADMIN — Medication 7.5 MG/MIN: at 07:50

## 2022-01-01 RX ADMIN — Medication 100 MILLIGRAM(S): at 11:48

## 2022-01-01 RX ADMIN — SPIRONOLACTONE 25 MILLIGRAM(S): 25 TABLET, FILM COATED ORAL at 12:47

## 2022-01-01 RX ADMIN — SODIUM CHLORIDE 1 GRAM(S): 9 INJECTION INTRAMUSCULAR; INTRAVENOUS; SUBCUTANEOUS at 05:16

## 2022-01-01 RX ADMIN — PANTOPRAZOLE SODIUM 40 MILLIGRAM(S): 20 TABLET, DELAYED RELEASE ORAL at 05:21

## 2022-01-01 RX ADMIN — Medication 5 MILLIGRAM(S): at 14:45

## 2022-01-01 RX ADMIN — MEROPENEM 100 MILLIGRAM(S): 1 INJECTION INTRAVENOUS at 17:03

## 2022-01-01 RX ADMIN — Medication 2000 UNIT(S): at 11:13

## 2022-01-01 RX ADMIN — SERTRALINE 100 MILLIGRAM(S): 25 TABLET, FILM COATED ORAL at 13:14

## 2022-01-01 RX ADMIN — Medication 1: at 00:26

## 2022-01-01 RX ADMIN — SODIUM CHLORIDE 1 GRAM(S): 9 INJECTION INTRAMUSCULAR; INTRAVENOUS; SUBCUTANEOUS at 21:27

## 2022-01-01 RX ADMIN — Medication 2000 UNIT(S): at 11:30

## 2022-01-01 RX ADMIN — Medication 650 MILLIGRAM(S): at 13:21

## 2022-01-01 RX ADMIN — Medication 10 MILLIGRAM(S): at 05:08

## 2022-01-01 RX ADMIN — Medication 100 MILLIGRAM(S): at 18:16

## 2022-01-01 RX ADMIN — MAGNESIUM OXIDE 400 MG ORAL TABLET 400 MILLIGRAM(S): 241.3 TABLET ORAL at 11:36

## 2022-01-01 RX ADMIN — Medication 1 TABLET(S): at 11:48

## 2022-01-01 RX ADMIN — Medication 100 MILLIGRAM(S): at 12:10

## 2022-01-01 RX ADMIN — CHLORHEXIDINE GLUCONATE 1 APPLICATION(S): 213 SOLUTION TOPICAL at 05:57

## 2022-01-01 RX ADMIN — Medication 5 MILLIGRAM(S): at 15:14

## 2022-01-01 RX ADMIN — SPIRONOLACTONE 25 MILLIGRAM(S): 25 TABLET, FILM COATED ORAL at 08:43

## 2022-01-01 RX ADMIN — MIDODRINE HYDROCHLORIDE 20 MILLIGRAM(S): 2.5 TABLET ORAL at 15:27

## 2022-01-01 RX ADMIN — Medication 500 MILLIGRAM(S): at 12:02

## 2022-01-01 RX ADMIN — Medication 500 MILLIGRAM(S): at 11:22

## 2022-01-01 RX ADMIN — ENOXAPARIN SODIUM 30 MILLIGRAM(S): 100 INJECTION SUBCUTANEOUS at 12:30

## 2022-01-01 RX ADMIN — MIRTAZAPINE 7.5 MILLIGRAM(S): 45 TABLET, ORALLY DISINTEGRATING ORAL at 21:06

## 2022-01-01 RX ADMIN — Medication 1 TABLET(S): at 13:13

## 2022-01-01 RX ADMIN — Medication 500 MILLIGRAM(S): at 13:20

## 2022-01-01 RX ADMIN — Medication 2.5 MILLIGRAM(S): at 05:40

## 2022-01-01 RX ADMIN — SERTRALINE 100 MILLIGRAM(S): 25 TABLET, FILM COATED ORAL at 12:09

## 2022-01-01 RX ADMIN — PIPERACILLIN AND TAZOBACTAM 25 GRAM(S): 4; .5 INJECTION, POWDER, LYOPHILIZED, FOR SOLUTION INTRAVENOUS at 22:50

## 2022-01-01 RX ADMIN — CEFEPIME 100 MILLIGRAM(S): 1 INJECTION, POWDER, FOR SOLUTION INTRAMUSCULAR; INTRAVENOUS at 05:18

## 2022-01-01 RX ADMIN — SODIUM CHLORIDE 3 MILLILITER(S): 9 INJECTION INTRAMUSCULAR; INTRAVENOUS; SUBCUTANEOUS at 06:46

## 2022-01-01 RX ADMIN — PIPERACILLIN AND TAZOBACTAM 25 GRAM(S): 4; .5 INJECTION, POWDER, LYOPHILIZED, FOR SOLUTION INTRAVENOUS at 06:00

## 2022-01-01 RX ADMIN — PIPERACILLIN AND TAZOBACTAM 25 GRAM(S): 4; .5 INJECTION, POWDER, LYOPHILIZED, FOR SOLUTION INTRAVENOUS at 15:14

## 2022-01-01 RX ADMIN — Medication 5 MILLIGRAM(S): at 12:24

## 2022-01-01 RX ADMIN — VASOPRESSIN 1.2 UNIT(S)/MIN: 20 INJECTION INTRAVENOUS at 07:30

## 2022-01-01 RX ADMIN — Medication 50 MILLIGRAM(S): at 12:13

## 2022-01-01 RX ADMIN — Medication 50 MILLIGRAM(S): at 06:33

## 2022-01-01 RX ADMIN — Medication 750 MILLIGRAM(S): at 11:00

## 2022-01-01 RX ADMIN — SODIUM CHLORIDE 3 MILLILITER(S): 9 INJECTION INTRAMUSCULAR; INTRAVENOUS; SUBCUTANEOUS at 13:05

## 2022-01-01 RX ADMIN — SODIUM CHLORIDE 3 MILLILITER(S): 9 INJECTION INTRAMUSCULAR; INTRAVENOUS; SUBCUTANEOUS at 14:53

## 2022-01-01 RX ADMIN — MIRTAZAPINE 7.5 MILLIGRAM(S): 45 TABLET, ORALLY DISINTEGRATING ORAL at 21:42

## 2022-01-01 RX ADMIN — Medication 25 MILLIGRAM(S): at 06:00

## 2022-01-01 RX ADMIN — PANTOPRAZOLE SODIUM 40 MILLIGRAM(S): 20 TABLET, DELAYED RELEASE ORAL at 18:03

## 2022-01-01 RX ADMIN — SODIUM CHLORIDE 3 MILLILITER(S): 9 INJECTION INTRAMUSCULAR; INTRAVENOUS; SUBCUTANEOUS at 13:39

## 2022-01-01 RX ADMIN — Medication 10 MILLIGRAM(S): at 21:25

## 2022-01-01 RX ADMIN — MEROPENEM 100 MILLIGRAM(S): 1 INJECTION INTRAVENOUS at 05:47

## 2022-01-01 RX ADMIN — VASOPRESSIN 2.4 UNIT(S)/MIN: 20 INJECTION INTRAVENOUS at 23:36

## 2022-01-01 RX ADMIN — CHLORHEXIDINE GLUCONATE 1 APPLICATION(S): 213 SOLUTION TOPICAL at 08:05

## 2022-01-01 RX ADMIN — SPIRONOLACTONE 25 MILLIGRAM(S): 25 TABLET, FILM COATED ORAL at 08:41

## 2022-01-01 RX ADMIN — MEXILETINE HYDROCHLORIDE 150 MILLIGRAM(S): 150 CAPSULE ORAL at 13:12

## 2022-01-01 RX ADMIN — Medication 20 MILLIEQUIVALENT(S): at 06:17

## 2022-01-01 RX ADMIN — SODIUM CHLORIDE 1 GRAM(S): 9 INJECTION INTRAMUSCULAR; INTRAVENOUS; SUBCUTANEOUS at 21:53

## 2022-01-01 RX ADMIN — GABAPENTIN 100 MILLIGRAM(S): 400 CAPSULE ORAL at 05:27

## 2022-01-01 RX ADMIN — MIRTAZAPINE 7.5 MILLIGRAM(S): 45 TABLET, ORALLY DISINTEGRATING ORAL at 21:01

## 2022-01-01 RX ADMIN — Medication 50 MILLIGRAM(S): at 13:50

## 2022-01-01 RX ADMIN — CEFEPIME 100 MILLIGRAM(S): 1 INJECTION, POWDER, FOR SOLUTION INTRAMUSCULAR; INTRAVENOUS at 17:35

## 2022-01-01 RX ADMIN — Medication 50 MILLIEQUIVALENT(S): at 02:29

## 2022-01-01 RX ADMIN — PANTOPRAZOLE SODIUM 40 MILLIGRAM(S): 20 TABLET, DELAYED RELEASE ORAL at 05:27

## 2022-01-01 RX ADMIN — MEXILETINE HYDROCHLORIDE 150 MILLIGRAM(S): 150 CAPSULE ORAL at 05:25

## 2022-01-01 RX ADMIN — SODIUM CHLORIDE 50 MILLILITER(S): 9 INJECTION, SOLUTION INTRAVENOUS at 08:28

## 2022-01-01 RX ADMIN — PIPERACILLIN AND TAZOBACTAM 25 GRAM(S): 4; .5 INJECTION, POWDER, LYOPHILIZED, FOR SOLUTION INTRAVENOUS at 16:29

## 2022-01-01 RX ADMIN — SODIUM CHLORIDE 1 GRAM(S): 9 INJECTION INTRAMUSCULAR; INTRAVENOUS; SUBCUTANEOUS at 05:18

## 2022-01-01 RX ADMIN — PIPERACILLIN AND TAZOBACTAM 25 GRAM(S): 4; .5 INJECTION, POWDER, LYOPHILIZED, FOR SOLUTION INTRAVENOUS at 21:28

## 2022-01-01 RX ADMIN — PIPERACILLIN AND TAZOBACTAM 25 GRAM(S): 4; .5 INJECTION, POWDER, LYOPHILIZED, FOR SOLUTION INTRAVENOUS at 21:48

## 2022-01-01 RX ADMIN — Medication 25 GRAM(S): at 09:47

## 2022-01-01 RX ADMIN — PIPERACILLIN AND TAZOBACTAM 25 GRAM(S): 4; .5 INJECTION, POWDER, LYOPHILIZED, FOR SOLUTION INTRAVENOUS at 14:55

## 2022-01-01 RX ADMIN — Medication 2.5 MILLIGRAM(S): at 16:51

## 2022-01-01 RX ADMIN — Medication 2: at 12:46

## 2022-01-01 RX ADMIN — MAGNESIUM OXIDE 400 MG ORAL TABLET 400 MILLIGRAM(S): 241.3 TABLET ORAL at 17:01

## 2022-01-01 RX ADMIN — SENNA PLUS 2 TABLET(S): 8.6 TABLET ORAL at 20:41

## 2022-01-01 RX ADMIN — GABAPENTIN 100 MILLIGRAM(S): 400 CAPSULE ORAL at 21:59

## 2022-01-01 RX ADMIN — SPIRONOLACTONE 25 MILLIGRAM(S): 25 TABLET, FILM COATED ORAL at 08:33

## 2022-01-01 RX ADMIN — Medication 1 TABLET(S): at 13:21

## 2022-01-01 RX ADMIN — SPIRONOLACTONE 25 MILLIGRAM(S): 25 TABLET, FILM COATED ORAL at 08:19

## 2022-01-01 RX ADMIN — Medication 25 GRAM(S): at 18:12

## 2022-01-01 RX ADMIN — Medication 1 TABLET(S): at 04:53

## 2022-01-01 RX ADMIN — GABAPENTIN 125 MILLIGRAM(S): 400 CAPSULE ORAL at 15:35

## 2022-01-01 RX ADMIN — SODIUM CHLORIDE 1 GRAM(S): 9 INJECTION INTRAMUSCULAR; INTRAVENOUS; SUBCUTANEOUS at 21:49

## 2022-01-01 RX ADMIN — MEXILETINE HYDROCHLORIDE 150 MILLIGRAM(S): 150 CAPSULE ORAL at 20:20

## 2022-01-01 RX ADMIN — CHLORHEXIDINE GLUCONATE 1 APPLICATION(S): 213 SOLUTION TOPICAL at 05:23

## 2022-01-01 RX ADMIN — Medication 1: at 05:57

## 2022-01-01 RX ADMIN — Medication 100 MILLIGRAM(S): at 12:50

## 2022-01-01 RX ADMIN — PIPERACILLIN AND TAZOBACTAM 25 GRAM(S): 4; .5 INJECTION, POWDER, LYOPHILIZED, FOR SOLUTION INTRAVENOUS at 04:50

## 2022-01-01 RX ADMIN — Medication 1 TABLET(S): at 14:45

## 2022-01-01 RX ADMIN — SODIUM CHLORIDE 1 GRAM(S): 9 INJECTION INTRAMUSCULAR; INTRAVENOUS; SUBCUTANEOUS at 00:57

## 2022-01-01 RX ADMIN — Medication 100 GRAM(S): at 23:25

## 2022-01-01 RX ADMIN — MEXILETINE HYDROCHLORIDE 150 MILLIGRAM(S): 150 CAPSULE ORAL at 20:57

## 2022-01-01 RX ADMIN — SODIUM CHLORIDE 3 MILLILITER(S): 9 INJECTION INTRAMUSCULAR; INTRAVENOUS; SUBCUTANEOUS at 22:19

## 2022-01-01 RX ADMIN — SODIUM CHLORIDE 1 GRAM(S): 9 INJECTION INTRAMUSCULAR; INTRAVENOUS; SUBCUTANEOUS at 12:52

## 2022-01-01 RX ADMIN — MEXILETINE HYDROCHLORIDE 150 MILLIGRAM(S): 150 CAPSULE ORAL at 21:10

## 2022-01-01 RX ADMIN — CEFEPIME 100 MILLIGRAM(S): 1 INJECTION, POWDER, FOR SOLUTION INTRAMUSCULAR; INTRAVENOUS at 05:25

## 2022-01-01 RX ADMIN — Medication 1 TABLET(S): at 12:25

## 2022-01-01 RX ADMIN — MEXILETINE HYDROCHLORIDE 150 MILLIGRAM(S): 150 CAPSULE ORAL at 12:50

## 2022-01-01 RX ADMIN — MAGNESIUM OXIDE 400 MG ORAL TABLET 400 MILLIGRAM(S): 241.3 TABLET ORAL at 17:46

## 2022-01-01 RX ADMIN — PANTOPRAZOLE SODIUM 40 MILLIGRAM(S): 20 TABLET, DELAYED RELEASE ORAL at 17:19

## 2022-01-01 RX ADMIN — PANTOPRAZOLE SODIUM 40 MILLIGRAM(S): 20 TABLET, DELAYED RELEASE ORAL at 17:27

## 2022-01-01 RX ADMIN — CHLORHEXIDINE GLUCONATE 1 APPLICATION(S): 213 SOLUTION TOPICAL at 09:02

## 2022-01-01 RX ADMIN — Medication 10 MILLIGRAM(S): at 11:41

## 2022-01-01 RX ADMIN — Medication 5 MILLIGRAM(S): at 15:27

## 2022-01-01 RX ADMIN — Medication 100 MILLIGRAM(S): at 11:22

## 2022-01-01 RX ADMIN — Medication 2.5 MILLIGRAM(S): at 11:30

## 2022-01-01 RX ADMIN — Medication 5 MILLIGRAM(S): at 14:30

## 2022-01-01 RX ADMIN — Medication 50 MILLIGRAM(S): at 17:00

## 2022-01-01 RX ADMIN — CEFEPIME 100 MILLIGRAM(S): 1 INJECTION, POWDER, FOR SOLUTION INTRAMUSCULAR; INTRAVENOUS at 18:08

## 2022-01-01 RX ADMIN — PIPERACILLIN AND TAZOBACTAM 25 GRAM(S): 4; .5 INJECTION, POWDER, LYOPHILIZED, FOR SOLUTION INTRAVENOUS at 15:55

## 2022-01-01 RX ADMIN — VASOPRESSIN 1.2 UNIT(S)/MIN: 20 INJECTION INTRAVENOUS at 19:19

## 2022-01-01 RX ADMIN — Medication 100 GRAM(S): at 01:41

## 2022-01-01 RX ADMIN — POLYETHYLENE GLYCOL 3350 17 GRAM(S): 17 POWDER, FOR SOLUTION ORAL at 06:02

## 2022-01-01 RX ADMIN — SODIUM CHLORIDE 3 MILLILITER(S): 9 INJECTION INTRAMUSCULAR; INTRAVENOUS; SUBCUTANEOUS at 05:10

## 2022-01-01 RX ADMIN — Medication 10 MILLIGRAM(S): at 11:13

## 2022-01-01 RX ADMIN — Medication 5 MILLIGRAM(S): at 17:33

## 2022-01-01 RX ADMIN — Medication 10 MILLIGRAM(S): at 05:46

## 2022-01-01 RX ADMIN — Medication 100 MILLIGRAM(S): at 11:13

## 2022-01-01 RX ADMIN — Medication 25 MILLIGRAM(S): at 05:41

## 2022-01-01 RX ADMIN — CEFEPIME 100 MILLIGRAM(S): 1 INJECTION, POWDER, FOR SOLUTION INTRAMUSCULAR; INTRAVENOUS at 17:08

## 2022-01-01 RX ADMIN — SODIUM CHLORIDE 1 GRAM(S): 9 INJECTION INTRAMUSCULAR; INTRAVENOUS; SUBCUTANEOUS at 17:21

## 2022-01-01 RX ADMIN — MAGNESIUM OXIDE 400 MG ORAL TABLET 400 MILLIGRAM(S): 241.3 TABLET ORAL at 12:05

## 2022-01-01 RX ADMIN — Medication 100 GRAM(S): at 05:19

## 2022-01-01 RX ADMIN — SERTRALINE 100 MILLIGRAM(S): 25 TABLET, FILM COATED ORAL at 12:27

## 2022-01-01 RX ADMIN — Medication 25 MILLIGRAM(S): at 05:57

## 2022-01-01 RX ADMIN — Medication 1000 UNIT(S): at 13:08

## 2022-01-01 RX ADMIN — MEXILETINE HYDROCHLORIDE 150 MILLIGRAM(S): 150 CAPSULE ORAL at 13:04

## 2022-01-01 RX ADMIN — MAGNESIUM OXIDE 400 MG ORAL TABLET 400 MILLIGRAM(S): 241.3 TABLET ORAL at 17:47

## 2022-01-01 RX ADMIN — Medication 5 MILLIGRAM(S): at 22:00

## 2022-01-01 RX ADMIN — MEROPENEM 100 MILLIGRAM(S): 1 INJECTION INTRAVENOUS at 17:13

## 2022-01-01 RX ADMIN — MEXILETINE HYDROCHLORIDE 150 MILLIGRAM(S): 150 CAPSULE ORAL at 05:47

## 2022-01-01 RX ADMIN — Medication 500 MILLIGRAM(S): at 11:34

## 2022-01-01 RX ADMIN — Medication 125 MILLIGRAM(S): at 17:06

## 2022-01-01 RX ADMIN — SERTRALINE 100 MILLIGRAM(S): 25 TABLET, FILM COATED ORAL at 12:49

## 2022-01-01 RX ADMIN — VASOPRESSIN 1.2 UNIT(S)/MIN: 20 INJECTION INTRAVENOUS at 05:09

## 2022-01-01 RX ADMIN — Medication 1: at 12:49

## 2022-01-01 RX ADMIN — MEXILETINE HYDROCHLORIDE 150 MILLIGRAM(S): 150 CAPSULE ORAL at 12:27

## 2022-01-01 RX ADMIN — Medication 10 MILLIGRAM(S): at 20:57

## 2022-01-01 RX ADMIN — SODIUM CHLORIDE 500 MILLILITER(S): 9 INJECTION INTRAMUSCULAR; INTRAVENOUS; SUBCUTANEOUS at 20:55

## 2022-01-01 RX ADMIN — SODIUM CHLORIDE 3 MILLILITER(S): 9 INJECTION INTRAMUSCULAR; INTRAVENOUS; SUBCUTANEOUS at 05:32

## 2022-01-01 RX ADMIN — GABAPENTIN 100 MILLIGRAM(S): 400 CAPSULE ORAL at 17:09

## 2022-01-01 RX ADMIN — Medication 2000 UNIT(S): at 12:49

## 2022-01-01 RX ADMIN — MIRTAZAPINE 7.5 MILLIGRAM(S): 45 TABLET, ORALLY DISINTEGRATING ORAL at 21:43

## 2022-01-01 RX ADMIN — MEXILETINE HYDROCHLORIDE 150 MILLIGRAM(S): 150 CAPSULE ORAL at 14:05

## 2022-01-01 RX ADMIN — Medication 10 MILLIGRAM(S): at 21:37

## 2022-01-01 RX ADMIN — CHLORHEXIDINE GLUCONATE 1 APPLICATION(S): 213 SOLUTION TOPICAL at 08:53

## 2022-01-01 RX ADMIN — INSULIN HUMAN 10 UNIT(S): 100 INJECTION, SOLUTION SUBCUTANEOUS at 07:14

## 2022-01-01 RX ADMIN — Medication 5 MILLIGRAM(S): at 13:39

## 2022-01-01 RX ADMIN — Medication 100 MILLIGRAM(S): at 05:12

## 2022-01-01 RX ADMIN — Medication 25 MILLIGRAM(S): at 05:06

## 2022-01-01 RX ADMIN — PIPERACILLIN AND TAZOBACTAM 200 GRAM(S): 4; .5 INJECTION, POWDER, LYOPHILIZED, FOR SOLUTION INTRAVENOUS at 17:05

## 2022-01-01 RX ADMIN — Medication 1: at 17:56

## 2022-01-01 RX ADMIN — Medication 1: at 07:06

## 2022-01-01 RX ADMIN — MEXILETINE HYDROCHLORIDE 150 MILLIGRAM(S): 150 CAPSULE ORAL at 21:39

## 2022-01-01 RX ADMIN — PANTOPRAZOLE SODIUM 40 MILLIGRAM(S): 20 TABLET, DELAYED RELEASE ORAL at 17:11

## 2022-01-01 RX ADMIN — Medication 5 MILLIGRAM(S): at 05:52

## 2022-01-01 RX ADMIN — MEXILETINE HYDROCHLORIDE 150 MILLIGRAM(S): 150 CAPSULE ORAL at 13:31

## 2022-01-01 RX ADMIN — Medication 500 MILLIGRAM(S): at 12:26

## 2022-01-01 RX ADMIN — SODIUM CHLORIDE 3 MILLILITER(S): 9 INJECTION INTRAMUSCULAR; INTRAVENOUS; SUBCUTANEOUS at 06:20

## 2022-01-01 RX ADMIN — MAGNESIUM OXIDE 400 MG ORAL TABLET 400 MILLIGRAM(S): 241.3 TABLET ORAL at 09:12

## 2022-01-01 RX ADMIN — SODIUM CHLORIDE 3 MILLILITER(S): 9 INJECTION INTRAMUSCULAR; INTRAVENOUS; SUBCUTANEOUS at 06:45

## 2022-01-01 RX ADMIN — Medication 1: at 05:56

## 2022-01-01 RX ADMIN — Medication 25 GRAM(S): at 05:28

## 2022-01-01 RX ADMIN — MEXILETINE HYDROCHLORIDE 150 MILLIGRAM(S): 150 CAPSULE ORAL at 13:19

## 2022-01-01 RX ADMIN — MEXILETINE HYDROCHLORIDE 150 MILLIGRAM(S): 150 CAPSULE ORAL at 12:51

## 2022-01-01 RX ADMIN — MIDODRINE HYDROCHLORIDE 20 MILLIGRAM(S): 2.5 TABLET ORAL at 23:22

## 2022-01-01 RX ADMIN — GABAPENTIN 125 MILLIGRAM(S): 400 CAPSULE ORAL at 21:52

## 2022-01-01 RX ADMIN — Medication 1 TABLET(S): at 12:16

## 2022-01-01 RX ADMIN — Medication 1: at 23:26

## 2022-01-01 RX ADMIN — MAGNESIUM OXIDE 400 MG ORAL TABLET 400 MILLIGRAM(S): 241.3 TABLET ORAL at 17:54

## 2022-01-01 RX ADMIN — MEXILETINE HYDROCHLORIDE 150 MILLIGRAM(S): 150 CAPSULE ORAL at 06:33

## 2022-01-01 RX ADMIN — SODIUM CHLORIDE 3 MILLILITER(S): 9 INJECTION INTRAMUSCULAR; INTRAVENOUS; SUBCUTANEOUS at 14:10

## 2022-01-01 RX ADMIN — Medication 7.5 MG/MIN: at 12:29

## 2022-01-01 RX ADMIN — SODIUM CHLORIDE 1 GRAM(S): 9 INJECTION INTRAMUSCULAR; INTRAVENOUS; SUBCUTANEOUS at 05:06

## 2022-01-01 RX ADMIN — Medication 125 MILLIGRAM(S): at 04:57

## 2022-01-01 RX ADMIN — SENNA PLUS 2 TABLET(S): 8.6 TABLET ORAL at 22:44

## 2022-01-01 RX ADMIN — CEFEPIME 100 MILLIGRAM(S): 1 INJECTION, POWDER, FOR SOLUTION INTRAMUSCULAR; INTRAVENOUS at 17:39

## 2022-01-01 RX ADMIN — PIPERACILLIN AND TAZOBACTAM 25 GRAM(S): 4; .5 INJECTION, POWDER, LYOPHILIZED, FOR SOLUTION INTRAVENOUS at 12:43

## 2022-01-01 RX ADMIN — MIRTAZAPINE 7.5 MILLIGRAM(S): 45 TABLET, ORALLY DISINTEGRATING ORAL at 21:17

## 2022-01-01 RX ADMIN — Medication 1 TABLET(S): at 11:34

## 2022-01-01 RX ADMIN — Medication 5 MILLIGRAM(S): at 05:43

## 2022-01-01 RX ADMIN — MEXILETINE HYDROCHLORIDE 150 MILLIGRAM(S): 150 CAPSULE ORAL at 05:51

## 2022-01-01 RX ADMIN — SODIUM CHLORIDE 3 MILLILITER(S): 9 INJECTION INTRAMUSCULAR; INTRAVENOUS; SUBCUTANEOUS at 12:20

## 2022-01-01 RX ADMIN — PIPERACILLIN AND TAZOBACTAM 25 GRAM(S): 4; .5 INJECTION, POWDER, LYOPHILIZED, FOR SOLUTION INTRAVENOUS at 14:05

## 2022-01-01 RX ADMIN — MAGNESIUM OXIDE 400 MG ORAL TABLET 400 MILLIGRAM(S): 241.3 TABLET ORAL at 08:16

## 2022-01-01 RX ADMIN — SODIUM CHLORIDE 3 MILLILITER(S): 9 INJECTION INTRAMUSCULAR; INTRAVENOUS; SUBCUTANEOUS at 06:04

## 2022-01-01 RX ADMIN — Medication 1: at 11:53

## 2022-01-01 RX ADMIN — PIPERACILLIN AND TAZOBACTAM 25 GRAM(S): 4; .5 INJECTION, POWDER, LYOPHILIZED, FOR SOLUTION INTRAVENOUS at 05:02

## 2022-01-01 RX ADMIN — Medication 5 MILLIGRAM(S): at 13:11

## 2022-01-01 RX ADMIN — Medication 3 MILLIGRAM(S): at 21:52

## 2022-01-01 RX ADMIN — Medication 1 TABLET(S): at 11:47

## 2022-01-01 RX ADMIN — Medication 100 MILLIGRAM(S): at 12:02

## 2022-01-01 RX ADMIN — MAGNESIUM OXIDE 400 MG ORAL TABLET 400 MILLIGRAM(S): 241.3 TABLET ORAL at 17:14

## 2022-01-01 RX ADMIN — SODIUM CHLORIDE 1 GRAM(S): 9 INJECTION INTRAMUSCULAR; INTRAVENOUS; SUBCUTANEOUS at 05:26

## 2022-01-01 RX ADMIN — SODIUM CHLORIDE 1 GRAM(S): 9 INJECTION INTRAMUSCULAR; INTRAVENOUS; SUBCUTANEOUS at 12:44

## 2022-01-01 RX ADMIN — SERTRALINE 100 MILLIGRAM(S): 25 TABLET, FILM COATED ORAL at 12:05

## 2022-01-01 RX ADMIN — MEXILETINE HYDROCHLORIDE 150 MILLIGRAM(S): 150 CAPSULE ORAL at 21:07

## 2022-01-01 RX ADMIN — Medication 50 MILLIGRAM(S): at 21:07

## 2022-01-01 RX ADMIN — MAGNESIUM OXIDE 400 MG ORAL TABLET 400 MILLIGRAM(S): 241.3 TABLET ORAL at 17:35

## 2022-01-01 RX ADMIN — Medication 100 MILLIGRAM(S): at 17:15

## 2022-01-01 RX ADMIN — PANTOPRAZOLE SODIUM 40 MILLIGRAM(S): 20 TABLET, DELAYED RELEASE ORAL at 17:25

## 2022-01-01 RX ADMIN — MEROPENEM 100 MILLIGRAM(S): 1 INJECTION INTRAVENOUS at 05:12

## 2022-01-01 RX ADMIN — Medication 5 MILLIGRAM(S): at 14:58

## 2022-01-01 RX ADMIN — Medication 50 MILLIGRAM(S): at 05:06

## 2022-01-01 RX ADMIN — PIPERACILLIN AND TAZOBACTAM 25 GRAM(S): 4; .5 INJECTION, POWDER, LYOPHILIZED, FOR SOLUTION INTRAVENOUS at 21:49

## 2022-01-01 RX ADMIN — SODIUM CHLORIDE 3 MILLILITER(S): 9 INJECTION INTRAMUSCULAR; INTRAVENOUS; SUBCUTANEOUS at 12:44

## 2022-01-01 RX ADMIN — SPIRONOLACTONE 25 MILLIGRAM(S): 25 TABLET, FILM COATED ORAL at 08:15

## 2022-01-01 RX ADMIN — PIPERACILLIN AND TAZOBACTAM 25 GRAM(S): 4; .5 INJECTION, POWDER, LYOPHILIZED, FOR SOLUTION INTRAVENOUS at 21:11

## 2022-01-01 RX ADMIN — HYDROMORPHONE HYDROCHLORIDE 0.2 MILLIGRAM(S): 2 INJECTION INTRAMUSCULAR; INTRAVENOUS; SUBCUTANEOUS at 21:06

## 2022-01-01 RX ADMIN — Medication 10 MILLIGRAM(S): at 23:47

## 2022-01-01 RX ADMIN — SENNA PLUS 2 TABLET(S): 8.6 TABLET ORAL at 21:22

## 2022-01-01 RX ADMIN — Medication 7.5 MG/MIN: at 11:13

## 2022-01-01 RX ADMIN — CEFEPIME 100 MILLIGRAM(S): 1 INJECTION, POWDER, FOR SOLUTION INTRAMUSCULAR; INTRAVENOUS at 05:27

## 2022-01-01 RX ADMIN — Medication 10 MILLIGRAM(S): at 23:34

## 2022-01-01 RX ADMIN — Medication 1 TABLET(S): at 11:58

## 2022-01-01 RX ADMIN — Medication 1: at 00:39

## 2022-01-01 RX ADMIN — SERTRALINE 100 MILLIGRAM(S): 25 TABLET, FILM COATED ORAL at 13:21

## 2022-01-01 RX ADMIN — SODIUM CHLORIDE 3 MILLILITER(S): 9 INJECTION INTRAMUSCULAR; INTRAVENOUS; SUBCUTANEOUS at 04:45

## 2022-01-01 RX ADMIN — SODIUM CHLORIDE 1 GRAM(S): 9 INJECTION INTRAMUSCULAR; INTRAVENOUS; SUBCUTANEOUS at 14:30

## 2022-01-01 RX ADMIN — POLYETHYLENE GLYCOL 3350 17 GRAM(S): 17 POWDER, FOR SOLUTION ORAL at 17:32

## 2022-01-01 RX ADMIN — SODIUM CHLORIDE 3 MILLILITER(S): 9 INJECTION INTRAMUSCULAR; INTRAVENOUS; SUBCUTANEOUS at 06:11

## 2022-01-01 RX ADMIN — CHLORHEXIDINE GLUCONATE 1 APPLICATION(S): 213 SOLUTION TOPICAL at 06:01

## 2022-01-01 RX ADMIN — Medication 1 TABLET(S): at 09:50

## 2022-01-01 RX ADMIN — Medication 10 MILLIGRAM(S): at 16:39

## 2022-01-01 RX ADMIN — Medication 50 MILLIGRAM(S): at 09:25

## 2022-01-01 RX ADMIN — Medication 1 TABLET(S): at 11:18

## 2022-01-01 RX ADMIN — SERTRALINE 100 MILLIGRAM(S): 25 TABLET, FILM COATED ORAL at 12:44

## 2022-01-01 RX ADMIN — SODIUM CHLORIDE 3 MILLILITER(S): 9 INJECTION INTRAMUSCULAR; INTRAVENOUS; SUBCUTANEOUS at 12:43

## 2022-01-01 RX ADMIN — Medication 50 MILLIGRAM(S): at 03:36

## 2022-01-01 RX ADMIN — MIRTAZAPINE 7.5 MILLIGRAM(S): 45 TABLET, ORALLY DISINTEGRATING ORAL at 22:32

## 2022-01-01 RX ADMIN — Medication 50 MILLIGRAM(S): at 18:00

## 2022-01-01 RX ADMIN — PIPERACILLIN AND TAZOBACTAM 25 GRAM(S): 4; .5 INJECTION, POWDER, LYOPHILIZED, FOR SOLUTION INTRAVENOUS at 15:00

## 2022-01-01 RX ADMIN — CEFEPIME 100 MILLIGRAM(S): 1 INJECTION, POWDER, FOR SOLUTION INTRAMUSCULAR; INTRAVENOUS at 12:40

## 2022-01-01 RX ADMIN — Medication 100 MILLIGRAM(S): at 05:01

## 2022-01-01 RX ADMIN — Medication 166.67 MILLILITER(S): at 16:45

## 2022-01-01 RX ADMIN — CHLORHEXIDINE GLUCONATE 1 APPLICATION(S): 213 SOLUTION TOPICAL at 11:04

## 2022-01-01 RX ADMIN — SODIUM CHLORIDE 1 GRAM(S): 9 INJECTION INTRAMUSCULAR; INTRAVENOUS; SUBCUTANEOUS at 05:41

## 2022-01-01 RX ADMIN — SERTRALINE 100 MILLIGRAM(S): 25 TABLET, FILM COATED ORAL at 13:12

## 2022-01-01 RX ADMIN — Medication 500 MILLIGRAM(S): at 11:48

## 2022-01-01 RX ADMIN — ENOXAPARIN SODIUM 30 MILLIGRAM(S): 100 INJECTION SUBCUTANEOUS at 11:49

## 2022-01-01 RX ADMIN — Medication 1 TABLET(S): at 13:08

## 2022-01-01 RX ADMIN — POTASSIUM PHOSPHATE, MONOBASIC POTASSIUM PHOSPHATE, DIBASIC 62.5 MILLIMOLE(S): 236; 224 INJECTION, SOLUTION INTRAVENOUS at 19:19

## 2022-01-01 RX ADMIN — SERTRALINE 100 MILLIGRAM(S): 25 TABLET, FILM COATED ORAL at 12:52

## 2022-01-01 RX ADMIN — MEXILETINE HYDROCHLORIDE 150 MILLIGRAM(S): 150 CAPSULE ORAL at 05:14

## 2022-01-01 RX ADMIN — Medication 500 MILLIGRAM(S): at 11:32

## 2022-01-01 RX ADMIN — Medication 50 MILLIGRAM(S): at 05:02

## 2022-01-01 RX ADMIN — MEXILETINE HYDROCHLORIDE 150 MILLIGRAM(S): 150 CAPSULE ORAL at 20:40

## 2022-01-01 RX ADMIN — Medication 125 MILLIGRAM(S): at 05:16

## 2022-01-01 RX ADMIN — Medication 50 MILLILITER(S): at 18:58

## 2022-01-01 NOTE — PROGRESS NOTE ADULT - ASSESSMENT
64 yo man with a history of VAD placement history of COVID infection in April 2020, history of a prolonged hospitalization in 2021 with recurrent sepsis, pneumonia, intubated/trach x2 cycles , chronic gall bladder disease s/p perc dawn, SMA ischemia requiring a stent placement, cachexia who was discharged to rehab but is presented from [facility] on 12/13 with AMS, hypotensive, tachycardic found to be COVID (+). Pt lethargic and mumbling words required ICU admission and pressor support now titrated off and downgraded to the floor. Pending dispo to [facility].

## 2022-01-01 NOTE — RAPID RESPONSE TEAM SUMMARY - NSSITUATIONBACKGROUNDRRT_GEN_ALL_CORE
64 yo man with a history of Stage D HF s/p VAD placement, history of COVID infection in April 2020, history of a prolonged hospitalization in 2021 with recurrent sepsis, pneumonia, intubated/trach x2 cycles , chronic gall bladder disease s/p perc dawn, SMA ischemia requiring a stent placement, cachexia who was discharged to rehab but presented from [facility] on 12/13 with AMS, hypotension, tachycardia found to be COVID (+) + serratia bacteremia.    RRT called today for hypoxia to 85% in setting of possible aspiration. Upon arrival, pt coughing with increased secretions, dry heaving. Also noted to have increased secretions from stoma during episode. Saturating 90% on NC. Otherwise HDS. Suctioned at bedside. Given 4mg IV Zofran x 1 w/ resolution of episode. Repeat vitals stable, saturation 97% on NC. Mentation at baseline.    Recommendation   66 yo man with a history of Stage D HF s/p VAD placement, history of COVID infection in April 2020, history of a prolonged hospitalization in 2021 with recurrent sepsis, pneumonia, intubated/trach x2 cycles , chronic gall bladder disease s/p perc dawn, SMA ischemia requiring a stent placement, cachexia who was discharged to rehab but presented from [facility] on 12/13 with AMS, hypotension, tachycardia found to be COVID (+) + serratia bacteremia.    RRT called today for hypoxia to 85% in setting of possible aspiration. Upon arrival, pt coughing with increased secretions, dry heaving. Also noted to have increased secretions from stoma during episode. Saturating 90% on NC. Otherwise HDS. Suctioned at bedside. Given 4mg IV Zofran x 1 w/ resolution of episode. Repeat vitals stable, saturation 97% on NC. Mentation at baseline.    Recommendation  -D/c regular diet, recommend dysphagia diet   -Consider S/S evaluation  -Consider ENT for trach site eval  -Rest of care per primary team

## 2022-01-01 NOTE — PROGRESS NOTE ADULT - PROBLEM SELECTOR PLAN 2
- History of recurrent pneumonia and bacteremia  - With stenotrophomonas in prior sputum cultures and enterobacter and serratia in blood in October 2021  - Blood cultures 12/13 + Serratia  - RUQ sonogram negative  - Cefepime 2g IV q8 (12/13-12/27)  - Bactrim started (12/27-12/30)  - Cefepime restarted (12/30- ) for fever on bactrim  - repeat bcx NGTD  - as per ID, will need suppressive treatment of bacteremia x2weeks with quinolone on dc

## 2022-01-01 NOTE — PROGRESS NOTE ADULT - ATTENDING COMMENTS
66 yo man with a history of Stage D HF s/p VAD placement, history of COVID infection in April 2020, history of a prolonged hospitalization in 2021 with recurrent sepsis, pneumonia, intubated/trach x2 cycles , chronic gall bladder disease s/p perc dawn, SMA ischemia requiring a stent placement, cachexia who was discharged to rehab but presented from [facility] on 12/13 with AMS, hypotension, tachycardia found to be COVID (+) + serratia bacteremia. Initially admitted to ICU requiring pressor support, now on floors. Was febrile again while on bactrim, thus abx changed back to Cefepime. Will continue Cefepime for now, f/u cultures. On PO Vancomycin for c diff. Na improved after fluid challenge yesterday, will give gentle fluid hydration again today

## 2022-01-01 NOTE — PROGRESS NOTE ADULT - SUBJECTIVE AND OBJECTIVE BOX
**************************  Bre El  PGY-1 Internal Medicine  [phone]  **************************    Patient is a 66y old  Male who presents with a chief complaint of COVID (+) with AMS and hypotension (31 Dec 2021 08:52)      SUBJECTIVE / OVERNIGHT EVENTS:  No overnight events. On telemetry, NSR to tachy 90-100s yesterday evening, and sinus tachy 100-120s overnight. Currently asymptomatic, denies chest pain or palpitations. Endorses loss of appetite and tired of being in the hospital.    MEDICATIONS  (STANDING):  aspirin  chewable 81 milliGRAM(s) Oral daily  baclofen 5 milliGRAM(s) Oral every 8 hours  cefepime   IVPB 2000 milliGRAM(s) IV Intermittent every 8 hours  chlorhexidine 2% Cloths 1 Application(s) Topical <User Schedule>  dextrose 50% Injectable 50 milliLiter(s) IV Push every 15 minutes  dextrose 50% Injectable 25 milliLiter(s) IV Push every 15 minutes  enoxaparin Injectable 30 milliGRAM(s) SubCutaneous daily  insulin lispro (ADMELOG) corrective regimen sliding scale   SubCutaneous three times a day before meals  insulin lispro (ADMELOG) corrective regimen sliding scale   SubCutaneous at bedtime  magnesium sulfate  IVPB 2 Gram(s) IV Intermittent once  methimazole 10 milliGRAM(s) Oral daily  metoclopramide Injectable 10 milliGRAM(s) IV Push every 8 hours  metoprolol succinate ER 50 milliGRAM(s) Oral daily  mirtazapine 7.5 milliGRAM(s) Oral at bedtime  multivitamin 1 Tablet(s) Oral daily  pantoprazole    Tablet 40 milliGRAM(s) Oral every 12 hours  potassium phosphate / sodium phosphate Tablet (K-PHOS No. 2) 1 Tablet(s) Oral two times a day  sertraline 100 milliGRAM(s) Oral daily  sodium chloride 1 Gram(s) Oral every 12 hours  sodium chloride 0.9% lock flush 3 milliLiter(s) IV Push every 8 hours  sodium chloride 0.9%. 1000 milliLiter(s) (100 mL/Hr) IV Continuous <Continuous>  spironolactone 25 milliGRAM(s) Oral daily  vancomycin    Solution 125 milliGRAM(s) Oral every 12 hours    MEDICATIONS  (PRN):  acetaminophen     Tablet .. 650 milliGRAM(s) Oral every 6 hours PRN Temp greater or equal to 38C (100.4F), Mild Pain (1 - 3), Moderate Pain (4 - 6)      CAPILLARY BLOOD GLUCOSE    POCT Blood Glucose.: 85 mg/dL (01 Jan 2022 08:03)  POCT Blood Glucose.: 145 mg/dL (31 Dec 2021 21:42)  POCT Blood Glucose.: 105 mg/dL (31 Dec 2021 17:46)  POCT Blood Glucose.: 165 mg/dL (31 Dec 2021 12:12)    I&O's Summary    31 Dec 2021 07:01  -  01 Jan 2022 07:00  --------------------------------------------------------  IN: 0 mL / OUT: 1250 mL / NET: -1250 mL      PHYSICAL EXAM:  Vital Signs Last 24 Hrs  T(C): 36.7 (01 Jan 2022 04:40), Max: 36.7 (31 Dec 2021 21:20)  T(F): 98 (01 Jan 2022 04:40), Max: 98 (31 Dec 2021 21:20)  HR: 63 (01 Jan 2022 04:40) (57 - 96)  BP: 96/64 (01 Jan 2022 04:40) (91/66 - 99/66)  BP(mean): 74 (01 Jan 2022 04:40) (74 - 77)  RR: 18 (01 Jan 2022 08:00) (18 - 98)  SpO2: 98% (31 Dec 2021 12:23) (98% - 98%)    CONSTITUTIONAL: NAD, well-developed; trach site bandaged c/d/i, clean underneath with clear mucus  RESPIRATORY: Normal respiratory effort; lungs are clear to auscultation bilaterally; on RA  CARDIOVASCULAR: LVAD heard; No lower extremity edema; Peripheral pulses are 2+ bilaterally  ABDOMEN: Nontender to palpation, normoactive bowel sounds, no rebound/guarding; No hepatosplenomegaly  MUSCLOSKELETAL: no clubbing or cyanosis of digits; no joint swelling or tenderness to palpation  PSYCH: A+O to person, place, and time; affect appropriate    LABS:                        9.1    7.43  )-----------( 185      ( 01 Jan 2022 05:57 )             28.0     01-01    130<L>  |  99  |  16  ----------------------------<  90  4.5   |  20<L>  |  0.62    Ca    9.2      01 Jan 2022 05:57  Phos  3.4     01-01  Mg     1.7     01-01    TPro  8.5<H>  /  Alb  3.3  /  TBili  0.2  /  DBili  x   /  AST  15  /  ALT  12  /  AlkPhos  131<H>  01-01      Culture - Blood (collected 30 Dec 2021 22:07)  Source: .Blood Blood  Preliminary Report (31 Dec 2021 23:01):    No growth to date.

## 2022-01-02 NOTE — PROGRESS NOTE ADULT - PROBLEM SELECTOR PLAN 2
- COVID PCR + 12/13. Recent CXR clear, respiratory status stable on room air   - s/p monoclonal antibodies Regeneron   - s/p 5 day course of Remdesivir  - s/p 10 day course of Dexamethasone  - repeat COVID PCR 12/26, 12/28 positive  - next COVID swab ordered for 1/2, pending results

## 2022-01-02 NOTE — PROGRESS NOTE ADULT - SUBJECTIVE AND OBJECTIVE BOX
Subjective:  - RRT called yesterday for hypoxia, patient noted to have an SpO2 85% with coughing, increased secretions, ? aspiration. SpO2 improved with NC, treated with zofran, SLP evaluation this morning pending  - This morning reports feeling well, OOB in chair without complaints. Reports he's walking. Denies SOB, lightheadedness, abdominal discomfort.     Medications:  acetaminophen     Tablet .. 650 milliGRAM(s) Oral every 6 hours PRN  aspirin  chewable 81 milliGRAM(s) Oral daily  baclofen 5 milliGRAM(s) Oral every 8 hours  cefepime   IVPB 2000 milliGRAM(s) IV Intermittent every 8 hours  chlorhexidine 2% Cloths 1 Application(s) Topical <User Schedule>  dextrose 50% Injectable 50 milliLiter(s) IV Push every 15 minutes  dextrose 50% Injectable 25 milliLiter(s) IV Push every 15 minutes  enoxaparin Injectable 30 milliGRAM(s) SubCutaneous daily  insulin lispro (ADMELOG) corrective regimen sliding scale   SubCutaneous three times a day before meals  insulin lispro (ADMELOG) corrective regimen sliding scale   SubCutaneous at bedtime  methimazole 10 milliGRAM(s) Oral daily  metoclopramide Injectable 10 milliGRAM(s) IV Push every 8 hours  metoprolol succinate ER 50 milliGRAM(s) Oral daily  mirtazapine 7.5 milliGRAM(s) Oral at bedtime  multivitamin 1 Tablet(s) Oral daily  pantoprazole    Tablet 40 milliGRAM(s) Oral every 12 hours  potassium phosphate / sodium phosphate Tablet (K-PHOS No. 2) 1 Tablet(s) Oral two times a day  sertraline 100 milliGRAM(s) Oral daily  sodium chloride 1 Gram(s) Oral every 12 hours  sodium chloride 0.9% lock flush 3 milliLiter(s) IV Push every 8 hours  spironolactone 25 milliGRAM(s) Oral daily  vancomycin    Solution 125 milliGRAM(s) Oral every 12 hours      Physical Exam:    Vitals:  Vital Signs Last 24 Hours  T(C): 36.6 (22 @ 05:09), Max: 36.7 (22 @ 12:45)  HR: 87 (22 @ 09:00) (87 - 104)  BP: 94/68 (22 @ 09:00) (90/66 - 100/76)  RR: 18 (22 @ 05:09) (18 - 18)  SpO2: 98% (22 @ 05:09) (97% - 100%)    Weight in k.3 ( @ 09:30)    I&O's Summary    2022 07:  -  2022 07:00  --------------------------------------------------------  IN: 600 mL / OUT: 2630 mL / NET: -2030 mL    2022 07:  -  2022 11:40  --------------------------------------------------------  IN: 520 mL / OUT: 300 mL / NET: 220 mL    LVAD Interrogation:  HM2  Speed 9200  Flow 5.6  Power 5.6  PI 5.7  No events noted  DLES dressing C/D/I    Tele: SR HR     General: Frail. No distress. Comfortable.  HEENT: EOM intact.  Neck: Neck supple. JVP < 6cm H2O. No masses  Chest: Clear to auscultation bilaterally  CV: LVAD hum. No LE edema.   Abdomen: Soft, non-distended, non-tender  Skin: No rashes or skin breakdown.  Neurology: Alert and oriented times three. Sensation intact  Psych: Affect flat.      Labs:                        9.4    6.74  )-----------( 196      ( 2022 06:11 )             29.7     01-02    133<L>  |  101  |  17  ----------------------------<  110<H>  4.2   |  19<L>  |  0.71    Ca    9.4      2022 06:11  Phos  3.6     01-02  Mg     1.7         TPro  8.8<H>  /  Alb  3.4  /  TBili  0.3  /  DBili  x   /  AST  17  /  ALT  11  /  AlkPhos  131<H>      Lactate Dehydrogenase, Serum: 195 U/L ( @ 06:11)  Lactate Dehydrogenase, Serum: 181 U/L ( @ 05:57)  Lactate Dehydrogenase, Serum: 214 U/L ( @ 05:47)

## 2022-01-02 NOTE — SWALLOW BEDSIDE ASSESSMENT ADULT - ASR SWALLOW ASPIRATION MONITOR
change of breathing pattern/cough/gurgly voice/fever/pneumonia/throat clearing/upper respiratory infection
Monitor for s/s aspiration/laryngeal penetration. If noted:  D/C p.o. intake, provide non-oral nutrition/hydration/meds, and contact this service @ [phone]/change of breathing pattern/cough/gurgly voice/fever/pneumonia/throat clearing/upper respiratory infection

## 2022-01-02 NOTE — SWALLOW BEDSIDE ASSESSMENT ADULT - SWALLOW EVAL: DIAGNOSIS
67 y/o M presenting from [facility] on 12/13 with AMS, hypotension, and tachycardia, found to be COVID (+), now off isolation precautions. Pt s/p RRT 1/1 for hypoxia to 85% iso possible aspiration. Pt was seen today 1/2 for bedside swallow re-evaluation. Pt presents with oral dysphagia. He was trialed with thin liquids and soft solids (deferred puree). The swallow sequence was characterized my mildly prolonged mastication of soft solid with palatal stasis (clears with thin liquid wash) and reduced AP bolus transfer of soft solids. Although no overt clinical s/s of aspiration or penetration observed with thin liquids or soft solids, recommend MBS to objectively re-evaluate kenroy-pharyngeal swallow mechanism given suspected aspiration event yesterday.

## 2022-01-02 NOTE — SWALLOW BEDSIDE ASSESSMENT ADULT - SPECIFY REASON(S)
to clinically re-evaluate pt's kenroy-pharyngeal swallow mechanism and r/o dysphagia. Per consult, pt with possible aspiration event.
to assess the swallow mechanism; r/o dysphagia

## 2022-01-02 NOTE — PROGRESS NOTE ADULT - ATTENDING COMMENTS
64 yo man with a history of Stage D HF s/p VAD placement, history of COVID infection in April 2020, history of a prolonged hospitalization in 2021 with recurrent sepsis, pneumonia, intubated/trach x2 cycles , chronic gall bladder disease s/p perc dawn, SMA ischemia requiring a stent placement, cachexia who was discharged to rehab but presented from [facility] on 12/13 with AMS, hypotension, tachycardia found to be COVID (+) + serratia bacteremia. Initially admitted to ICU requiring pressor support, now on floors. Was febrile again while on bactrim, thus abx changed back to Cefepime. Will continue Cefepime for now and transition to Quinolone on discharge. On PO Vancomycin for c diff. Na improved after fluid challenge yesterday. Had RRT overnight 2/2 aspiration event. Back on room air. Will have speech and swallow eval

## 2022-01-02 NOTE — PROGRESS NOTE ADULT - SUBJECTIVE AND OBJECTIVE BOX
PROGRESS NOTE:   Authored By: Carmen Persaud MD     PGY-3, Internal Medicine  Pager: [phone], [phone]    Patient is a 66y old  Male who presents with a chief complaint of COVID (+) with AMS and hypotension (02 Jan 2022 11:39)    OVERNIGHT EVENTS: Last evening, patient had RRT for hypoxia to 90% and suspected aspiration event. O2 sats improved after suctioning.     SUBJECTIVE: Pt seen and examined at bedside this morning. Patient awake, alert and pleasant. Reports that he feels well today. Denies any SOB or cough. Denies CP or dizziness.    ADDITIONAL REVIEW OF SYSTEMS:    MEDICATIONS  (STANDING):  aspirin  chewable 81 milliGRAM(s) Oral daily  baclofen 5 milliGRAM(s) Oral every 8 hours  cefepime   IVPB 2000 milliGRAM(s) IV Intermittent every 8 hours  chlorhexidine 2% Cloths 1 Application(s) Topical <User Schedule>  dextrose 50% Injectable 50 milliLiter(s) IV Push every 15 minutes  dextrose 50% Injectable 25 milliLiter(s) IV Push every 15 minutes  enoxaparin Injectable 30 milliGRAM(s) SubCutaneous daily  insulin lispro (ADMELOG) corrective regimen sliding scale   SubCutaneous three times a day before meals  insulin lispro (ADMELOG) corrective regimen sliding scale   SubCutaneous at bedtime  methimazole 10 milliGRAM(s) Oral daily  metoclopramide Injectable 10 milliGRAM(s) IV Push every 8 hours  metoprolol succinate ER 50 milliGRAM(s) Oral daily  mirtazapine 7.5 milliGRAM(s) Oral at bedtime  multivitamin 1 Tablet(s) Oral daily  pantoprazole    Tablet 40 milliGRAM(s) Oral every 12 hours  potassium phosphate / sodium phosphate Tablet (K-PHOS No. 2) 1 Tablet(s) Oral two times a day  sertraline 100 milliGRAM(s) Oral daily  sodium chloride 1 Gram(s) Oral every 12 hours  sodium chloride 0.9% lock flush 3 milliLiter(s) IV Push every 8 hours  spironolactone 25 milliGRAM(s) Oral daily  vancomycin    Solution 125 milliGRAM(s) Oral every 12 hours    MEDICATIONS  (PRN):  acetaminophen     Tablet .. 650 milliGRAM(s) Oral every 6 hours PRN Temp greater or equal to 38C (100.4F), Mild Pain (1 - 3), Moderate Pain (4 - 6)    CAPILLARY BLOOD GLUCOSE    POCT Blood Glucose.: 164 mg/dL (02 Jan 2022 12:44)  POCT Blood Glucose.: 93 mg/dL (02 Jan 2022 07:54)  POCT Blood Glucose.: 113 mg/dL (01 Jan 2022 22:11)  POCT Blood Glucose.: 123 mg/dL (01 Jan 2022 17:41)  POCT Blood Glucose.: 108 mg/dL (01 Jan 2022 17:27)    I&O's Summary    01 Jan 2022 07:01  -  02 Jan 2022 07:00  --------------------------------------------------------  IN: 600 mL / OUT: 2630 mL / NET: -2030 mL    02 Jan 2022 07:01  -  02 Jan 2022 13:30  --------------------------------------------------------  IN: 520 mL / OUT: 300 mL / NET: 220 mL    PHYSICAL EXAM:  Vital Signs Last 24 Hrs  T(C): 36.6 (02 Jan 2022 05:09), Max: 36.6 (01 Jan 2022 20:31)  T(F): 97.9 (02 Jan 2022 05:09), Max: 97.9 (01 Jan 2022 20:31)  HR: 87 (02 Jan 2022 09:00) (87 - 104)  BP: 94/68 (02 Jan 2022 09:00) (90/66 - 94/68)  BP(mean): 76 (02 Jan 2022 09:00) (73 - 82)  RR: 18 (02 Jan 2022 05:09) (18 - 18)  SpO2: 98% (02 Jan 2022 05:09) (97% - 98%)    CONSTITUTIONAL: NAD, well-developed  HEENT: NC/AT, EOMI, tracheostomy present with overlying dressing, c/d/i  RESPIRATORY: No increased work of breathing, CTAB, no wheezes or crackles appreciated  CARDIOVASCULAR: rhythm inaudible, LVAD hum present  ABDOMEN: soft, NT, ND, bowel sounds present  EXTREMITIES: No LE edema  MUSCULOSKELETAL: no joint swelling or tenderness to palpation  NEURO: A&Ox3, moving all extremities    LABS:                        9.4    6.74  )-----------( 196      ( 02 Jan 2022 06:11 )             29.7     01-02    133<L>  |  101  |  17  ----------------------------<  110<H>  4.2   |  19<L>  |  0.71    Ca    9.4      02 Jan 2022 06:11  Phos  3.6     01-02  Mg     1.7     01-02    TPro  8.8<H>  /  Alb  3.4  /  TBili  0.3  /  DBili  x   /  AST  17  /  ALT  11  /  AlkPhos  131<H>  01-02    Culture - Blood (collected 30 Dec 2021 22:07)  Source: .Blood Blood  Preliminary Report (31 Dec 2021 23:01):    No growth to date.    RADIOLOGY & ADDITIONAL TESTS:    Results Reviewed:   Imaging Personally Reviewed:  Electrocardiogram Personally Reviewed:    COORDINATION OF CARE:  Care Discussed with Consultants/Other Providers [Y/N]:  Prior or Outpatient Records Reviewed [Y/N]:

## 2022-01-02 NOTE — PROGRESS NOTE ADULT - PROBLEM SELECTOR PLAN 9
- Diet: Regular  - DVT: lovenox 30mg QD  - Dispo: pending placement in COVID positive/LVAD capable [facility] facility

## 2022-01-02 NOTE — SWALLOW BEDSIDE ASSESSMENT ADULT - COMMENTS
S/p monoclonal antibodies. Off note, the patient was admitted between 6/14 and 11/17 then between 12/2 and 12/6 and note is readmitted for the reasons above. As per HF, the patient rescinded his code status and he is now full code. Palliative care was called for GOC.    Patient well known to this service with hx of multiple swallow/PMV evaluations. Most recent objective swallow evaluation was a FEES conducted on 11/10/21 with trach in place with recommendations for Easy to chew texture diet with Thin liquids via SMALL SINGLE SIPS. Pt was most recently seen THIS hospital stay on 12/20/21 which revealed an overtly functional oropharyngeal swallow sequence for a regular diet with thin liquids. Recommendations at that time: Regular solids and thin liquids.    RRT called 1/1 for hypoxia to 85% in setting of possible aspiration. Upon arrival, pt coughing with increased secretions, dry heaving. Also noted to have increased secretions from stoma during episode. Saturating 90% on NC. Otherwise HDS. Suctioned at bedside. Given 4mg IV Zofran x 1 w/ resolution of episode. Repeat vitals stable, saturation 97% on NC. Mentation at baseline. Recommendations: d/c regular diet, recommend dysphagia diet. Consider S/S evaluation. Consider ENT for trach site eval.    IMAGING:  CXR: 12/17/21  IMPRESSION:  The heart is normal in size. The lungs are clear. No pleural effusion. No pneumothorax. Left ventricular assisted device is in good position. A loop recorder is overlying the left lower hemithorax. A central line is seen on the right and tip is in the superior vena cava. No pneumothorax.
S/p monoclonal antibodies.  Off note, the patient was admitted between 6/14 and 11/17 then between 12/2 and 12/6 and note is readmitted for the reasons above. As per HF, the patient rescinded his code status and he is now full code. Palliative care was called for GOC.    Patient well known to this service with hx of multiple swallow/PMV evaluations. Most recent swallow study was a FEES conducted on 11/10/21 with trach in place with recommendations for Easy to chew texture diet with Thin liquids via SMALL SINGLE SIPS.

## 2022-01-02 NOTE — SWALLOW BEDSIDE ASSESSMENT ADULT - SLP GENERAL OBSERVATIONS
Pt was received OOB in johny chair. +room air, +[place] (required FREQUENT loud repetition of utterance), +LVAD, +open stoma (air leak from stoma site when speaking). Pt was AAOx2 to self and general location. Stated the year was 2021 and unable to recall reason for hospital admission. Vocal quality noted to be hoarse, breathy, and rough. Pt able to follow ~50-75% of basic commands with visual and verbal cueing.
Patient encountered awake and alert, upright in bed, on RA, A&Ox3. Able to make wants/needs known. Inconsistently followed simple commands. +Open stoma with air leak. Vocal quality dysphonic (hoarse/breathy).

## 2022-01-02 NOTE — PROGRESS NOTE ADULT - PROBLEM SELECTOR PLAN 4
- continue with Toprol XL to 50 mg daily  - continue spironolactone 25 mg daily  - Daily PT  - Encourage oral intake and nutrition  - SLP evaluation today  - repeat CXR  - please resent COVID swab for dispo planning

## 2022-01-02 NOTE — PROGRESS NOTE ADULT - PROBLEM SELECTOR PLAN 1
Patient with suspected aspiration overnight, had episode of hypoxia with increased secretions  - no gross evidence of ongoing infection at this time  - patient currently comfortable on RA  - possible that patient may have had aspiration event  - S/S re-evaluation, patient recommended for minced/moist diet  - plan for MBS on 1/3  - CXR ordered, will f/u results

## 2022-01-02 NOTE — SWALLOW BEDSIDE ASSESSMENT ADULT - ADDITIONAL RECOMMENDATIONS
Maintain good oral hygiene.
GOALS:  1. Pt/family/caregiver will demonstrate understanding and carryover of dysphagia management (safe swallow guidelines, compensatory strategies, dysphagia diet).  2. Pt will tolerate recommended diet with no overt, clinical s/s of aspiration.  3. Pt will participate in Modified Barium Swallow Study (MBS) to objectively re-evaluate pt's kenroy-pharyngeal swallow mechanism.

## 2022-01-02 NOTE — SWALLOW BEDSIDE ASSESSMENT ADULT - ORAL PHASE
clears with thin liquid wash/Decreased anterior-posterior movement of the bolus/Delayed oral transit time/Palatal stasis Within functional limits

## 2022-01-02 NOTE — PROGRESS NOTE ADULT - ATTENDING COMMENTS
65 y/o M with a history of Stage D NICM s/p HM2 LVAD in 9/2017 at  who recently had a prolonged hospital course complicated by respiratory failure, sepsis, and GI bleeding who now comes in with COVID  Doing well and waiting for rehab placement, unfortunately he is still testing positive so he has to wait for it be negative. Will retest today  on aspirin 81mg, off systemic AC due to recurrent GI bleeding  For COVID is s/p monoclonal antibodies, remdesivir, and dexamethasone  Please resend a repeat COVID swab today  Noetd to have an episodes of hypoxia which resolved on its own and is now on room air. It was in the setting of him eating and he choked. Speech and swallow evaluated the patient and cleared him for a minced diet but wanted him to get a barium swallow test

## 2022-01-02 NOTE — SWALLOW BEDSIDE ASSESSMENT ADULT - SWALLOW EVAL: PATIENT/FAMILY GOALS STATEMENT
Pt endorsed consuming regular solids and thin liquids PTA without difficulty. Denied s/s of dysphagia.
Pt reported choking yesterday after using thin liquid wash after eating. Difficulty further elaborating.

## 2022-01-02 NOTE — PROGRESS NOTE ADULT - ASSESSMENT
65 y/o M with a history of Stage D NICM s/p HM2 LVAD in 9/2017 at  (due to severe PAD) with TV ring, multiple GI bleeds, thus maintained off AC, CKD 3prior COVID-19 infection in 4/2020, recurrent syncope post LVAD s/p ILR, s/p prolonged complex hospitalization from 6/14-11/16/2021 initially admitted with abdominal pain complicated by GIB, respiratory failure s/p trach, multiple infections, s/p cholecystotomy tube and SMA stent 10/2021, ultimately discharged to [facility] rehab and then returned to [facility] for trach decannulation 12/2 who now presents with recurrent COVID-19 infection, initially in distributive shock. 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     He had elevated inflammatory markers which are now improving. He received monoclonal antibodies and was started on remdesivir and dexamethasone. He's afebrile with BCx from 12/18 and 12/30 NGTD. He had an episode of hypoxia noted yesterday (1/1) evening, possibly in the setting of aspiration. He is currently saturating well on RA. He had runs of MMVT in setting of hypokalemia and being off his beta blocker which has since resolved. He currently appears well compensated from HF perspective. His LDH is stable. His LVAD is functioning well without s/s of pump malfunction. He's awaiting repeat SLP evaluation.    He is medically ready for discharge and are assessing options for discharge to rehab.

## 2022-01-02 NOTE — SWALLOW BEDSIDE ASSESSMENT ADULT - ASPIRATION PRECAUTIONS
yes
Monitor for s/s of aspiration or penetration (coughing, choking, wet/gurgly vocal qualiy). d/c PO intake if any signs are observed and contact speech department [phone]./yes

## 2022-01-03 NOTE — SWALLOW VFSS/MBS ASSESSMENT ADULT - SPECIFY REASON(S)
to objectively evaluate pt's kenroy-pharyngeal swallow mechanism iso suspected aspiration event 1/1.

## 2022-01-03 NOTE — PROGRESS NOTE ADULT - PROBLEM SELECTOR PLAN 2
- COVID PCR + 12/13. Recent CXR clear, respiratory status stable on room air   - s/p monoclonal antibodies Regeneron   - s/p 5 day course of Remdesivir  - s/p 10 day course of Dexamethasone  - repeat COVID PCR 12/26, 12/28 positive  - next COVID swab ordered for 1/2, pending results - COVID PCR + 12/13. Recent CXR clear, respiratory status stable on room air   - s/p monoclonal antibodies Regeneron   - s/p 5 day course of Remdesivir  - s/p 10 day course of Dexamethasone  - repeat COVID PCR 12/26, 12/28 positive  - COVID negative 1/3/2022

## 2022-01-03 NOTE — PROGRESS NOTE ADULT - ATTENDING COMMENTS
chronically ill appearing  LVAD  +abdominal drain  await updated ID recs  home discharge as per case mgmt pending resolution of acute medical issues

## 2022-01-03 NOTE — PROGRESS NOTE ADULT - ASSESSMENT
64 yo man with a history of VAD placement history of COVID infection in April 2020, history of a prolonged hospitalization in 2021 with recurrent sepsis, pneumonia, intubated/trach x2 cycles , chronic gall bladder disease s/p perc dawn, SMA ischemia requiring a stent placement, cachexia who was discharged to rehab but is presented from [facility] on 12/13 with AMS, hypotensive, tachycardic found to be COVID (+). Pt lethargic and mumbling words required ICU admission and pressor support now titrated off and downgraded to the floor. Pending dispo to [facility].

## 2022-01-03 NOTE — SWALLOW VFSS/MBS ASSESSMENT ADULT - RECOMMENDED FEEDING/EATING TECHNIQUES
meds in puree; chew completely; slow rate/oral hygiene/position upright (90 degrees)/small sips/bites

## 2022-01-03 NOTE — CHART NOTE - NSCHARTNOTEFT_GEN_A_CORE
Nutrition Follow Up Note  Patient seen for: Calorie Count    Chart reviewed, events noted.  IMM DC to [facility] w/  LVAD, MBS 1/3;. COVID+ (last ) off isolation now post 10 days, s/p abx for bacteremia.    Source: [] Patient       [x] EMR        [] RN        [] Family at bedside       [] Other:    -If unable to interview patient: [] Trach/Vent/BiPAP  [] Disoriented/confused/inappropriate to interview    Diet Order:   minced moist - Consistent Carbohydrate with snack    - Is current order appropriate/adequate? [] Yes  [x]  No:   pt has been drinking Glucerna as per calorie count, Glucerna not ordered for pt    - PO intake meals :    Calorie count   Day 1:  1381kcal/99grams protein  Day 2: incomplete -breakfast only- 590kcal/24gram protein  Day 3: incomplete - breakfast only - 370kcal/14 grams protein          - Nutrition-related concerns: discussed incomplete calorie count, provider made aware, new calorie count will be initiated if indicated      pt seen by SLP [x]Yes []No  recommend minced moist    GI:  Last BM ___.   Bowel Regimen? [] Yes   [x] No      Weights:   Daily Weight in k.7 (), Weight in k.3 (), Weight in k.2 (), Weight in k.1 (12-31), Weight in k.1 (12-30), Weight in k.3 (12-29), Weight in k.5 (12-28)    Nutritionally Pertinent MEDICATIONS  (STANDING):  cefepime   IVPB  dextrose 50% Injectable  dextrose 50% Injectable  insulin lispro (ADMELOG) corrective regimen sliding scale  insulin lispro (ADMELOG) corrective regimen sliding scale  methimazole  metoprolol succinate ER  multivitamin  pantoprazole    Tablet  potassium phosphate / sodium phosphate Tablet (K-PHOS No. 2)  sodium chloride  sodium chloride 0.9% lock flush  spironolactone  vancomycin    Solution    Pertinent Labs:  @ 09:49: Na 130<L>, BUN 14, Cr 0.63, <H>, K+ 4.2, Phos 3.4, Mg 1.5<L>, Alk Phos 132<H>, ALT/SGPT 14, AST/SGOT 17, HbA1c --    A1C with Estimated Average Glucose Result: 5.4 % (08-15-21 @ 13:52)    Finger Sticks:  POCT Blood Glucose.: 83 mg/dL ( @ 21:51)  POCT Blood Glucose.: 110 mg/dL ( @ 17:18)  POCT Blood Glucose.: 164 mg/dL ( @ 12:44)      Pressure Injuries as per nursing documentation: none  Edema: none    Estimated Needs:   [x] no change since previous assessment  [] recalculated:     Previous Nutrition Diagnosis: Severe-chronic Malnutrition & Underweight  Nutrition Diagnosis is: [x] ongoing  [] resolved [] not applicable     New Nutrition Diagnosis: [x] Not applicable    Nutrition Care Plan:  [] In Progress  [x] Achieved  [] Not applicable    Nutrition Interventions:     Education Provided:       [] Yes:  [x] No:   pt was educated on the importance of supplements to increase calorie and protein intake in light of RD's nutritional findings []Yes []N/A-not at this time-pt has been consuming supplements          Recommendations:         [x] Continue current diet order     [] Change diet to:      [x] add oral nutrition supplement: Glucerna x 2 daily        [x] Add micronutrient supplementation: consider Vit D 3 since Vit D 25 Hydroxy resulted low : 17.8     [x] Continue current micronutrient supplementation: multivitamin       [x]Discussed recommendations with provider     [x] Needed to escalate to provider     []Placed pending verification with NP/PA      []Placed pending verification with Team      []Placed sticker (malnutrition/BMI/underweight)     []Recommend swallow evaluation     [] monitor need for diet ed reinforcement     [] Other:     Monitoring and Evaluation:   Continue to monitor nutritional intake, tolerance to diet prescription, weights, labs, skin integrity      RD remains available upon request and will follow up per protocol  Caitlin Cortez MA, RD, CDN #[id number] Nutrition Follow Up Note  Patient seen for: Calorie Count    Chart reviewed, events noted.  IMM DC to [facility] w/  LVAD, MBS 1/3;. COVID+ (last ) off isolation now post 10 days, s/p abx for bacteremia.    Source: [] Patient       [x] EMR        [] RN        [] Family at bedside       [] Other:    -If unable to interview patient: [] Trach/Vent/BiPAP  [] Disoriented/confused/inappropriate to interview    Diet Order:   minced moist - Consistent Carbohydrate with snack    - Is current order appropriate/adequate? [] Yes  [x]  No:   pt has been drinking Glucerna as per calorie count, Glucerna not ordered for pt    - PO intake meals :    Calorie count   Day 1:  1381kcal/99grams protein  Day 2: incomplete -breakfast only- 590kcal/24gram protein  Day 3: incomplete - breakfast only - 370kcal/14 grams protein          - Nutrition-related concerns: discussed incomplete calorie count, provider made aware, new calorie count will be initiated if indicated      pt seen by SLP [x]Yes []No  recommend minced moist    GI:  Last BM ___.   Bowel Regimen? [] Yes   [x] No      Weights:   Daily Weight in k.7 (), Weight in k.3 (), Weight in k.2 (), Weight in k.1 (12-31), Weight in k.1 (12-30), Weight in k.3 (12-29), Weight in k.5 (12-28)    Nutritionally Pertinent MEDICATIONS  (STANDING):  cefepime   IVPB  dextrose 50% Injectable  dextrose 50% Injectable  insulin lispro (ADMELOG) corrective regimen sliding scale  insulin lispro (ADMELOG) corrective regimen sliding scale  methimazole  metoprolol succinate ER  multivitamin  pantoprazole    Tablet  potassium phosphate / sodium phosphate Tablet (K-PHOS No. 2)  sodium chloride  sodium chloride 0.9% lock flush  spironolactone  vancomycin    Solution    Pertinent Labs:  @ 09:49: Na 130<L>, BUN 14, Cr 0.63, <H>, K+ 4.2, Phos 3.4, Mg 1.5<L>, Alk Phos 132<H>, ALT/SGPT 14, AST/SGOT 17, HbA1c --    A1C with Estimated Average Glucose Result: 5.4 % (08-15-21 @ 13:52)    Finger Sticks:  POCT Blood Glucose.: 83 mg/dL ( @ 21:51)  POCT Blood Glucose.: 110 mg/dL ( @ 17:18)  POCT Blood Glucose.: 164 mg/dL ( @ 12:44)      Pressure Injuries as per nursing documentation: none  Edema: none    Estimated Needs:   [x] no change since previous assessment  [] recalculated:     Previous Nutrition Diagnosis: Severe-chronic Malnutrition & Underweight  Nutrition Diagnosis is: [x] ongoing  [] resolved [] not applicable     New Nutrition Diagnosis: [x] Not applicable    Nutrition Care Plan:  [] In Progress  [x] Achieved  [] Not applicable    Nutrition Interventions:     Education Provided:       [] Yes:  [x] No:   pt was educated on the importance of supplements to increase calorie and protein intake in light of RD's nutritional findings []Yes []N/A-not at this time-pt has been consuming supplements          Recommendations:         [x] Continue current diet order     [] Change diet to:      [x] add oral nutrition supplement: Glucerna x 2 daily        [x] Add micronutrient supplementation: consider Vit D 3 since Vit D 25 Hydroxy resulted low : 17.8     [x] Continue current micronutrient supplementation: multivitamin       [x]Discussed recommendations with provider     [x] Needed to escalate to provider (Jennifer Hair)     []Placed pending verification with NP/PA      []Placed pending verification with Team      []Placed sticker (malnutrition/BMI/underweight)     []Recommend swallow evaluation     [] monitor need for diet ed reinforcement     [] Other:     Monitoring and Evaluation:   Continue to monitor nutritional intake, tolerance to diet prescription, weights, labs, skin integrity      RD remains available upon request and will follow up per protocol  Caitlin Cortez MA, RD, CDN #[id number]

## 2022-01-03 NOTE — SWALLOW VFSS/MBS ASSESSMENT ADULT - SLP GENERAL OBSERVATIONS
Pt was received in radiology suite in REBECCA chair accompanied by Maryann MORIN (+LVAD). +room air, +[place], +open stoma (air leak from stoma site when speaking). Vocal quality hoarse, breathy, and rough. Pt able to follow basic commands with repetition.

## 2022-01-03 NOTE — SWALLOW VFSS/MBS ASSESSMENT ADULT - COMMENTS
S/p monoclonal antibodies. Off note, the patient was admitted between 6/14 and 11/17 then between 12/2 and 12/6 and note is readmitted for the reasons above. As per HF, the patient rescinded his code status and he is now full code. Palliative care was called for GOC.    Patient well known to this service with hx of multiple swallow/PMV evaluations. Most recent objective swallow evaluation was a FEES conducted on 11/10/21 with trach in place with recommendations for Easy to chew texture diet with Thin liquids via SMALL SINGLE SIPS. Pt was most recently seen THIS hospital stay on 12/20/21 which revealed an overtly functional oropharyngeal swallow sequence for a regular diet with thin liquids. Recommendations at that time: Regular solids and thin liquids.    RRT called 1/1 for hypoxia to 85% in setting of possible aspiration. Upon arrival, pt coughing with increased secretions, dry heaving. Also noted to have increased secretions from stoma during episode. Saturating 90% on NC. Otherwise HDS. Suctioned at bedside. Given 4mg IV Zofran x 1 w/ resolution of episode. Repeat vitals stable, saturation 97% on NC. Mentation at baseline. Recommendations: d/c regular diet, recommend dysphagia diet. Consider S/S evaluation. Consider ENT for trach site eval.    IMAGING:  CXR: 12/17/21  IMPRESSION:  The heart is normal in size. The lungs are clear. No pleural effusion. No pneumothorax. Left ventricular assisted device is in good position. A loop recorder is overlying the left lower hemithorax. A central line is seen on the right and tip is in the superior vena cava. No pneumothorax.

## 2022-01-03 NOTE — PROGRESS NOTE ADULT - SUBJECTIVE AND OBJECTIVE BOX
PROGRESS NOTE:   Patient is a 66y old  Male who presents with a chief complaint of COVID (+) with AMS and hypotension (02 Jan 2022 13:29)      SUBJECTIVE / OVERNIGHT EVENTS:    ADDITIONAL REVIEW OF SYSTEMS:    MEDICATIONS  (STANDING):  aspirin  chewable 81 milliGRAM(s) Oral daily  baclofen 5 milliGRAM(s) Oral every 8 hours  cefepime   IVPB 2000 milliGRAM(s) IV Intermittent every 8 hours  chlorhexidine 2% Cloths 1 Application(s) Topical <User Schedule>  dextrose 50% Injectable 50 milliLiter(s) IV Push every 15 minutes  dextrose 50% Injectable 25 milliLiter(s) IV Push every 15 minutes  enoxaparin Injectable 30 milliGRAM(s) SubCutaneous daily  insulin lispro (ADMELOG) corrective regimen sliding scale   SubCutaneous three times a day before meals  insulin lispro (ADMELOG) corrective regimen sliding scale   SubCutaneous at bedtime  methimazole 10 milliGRAM(s) Oral daily  metoclopramide Injectable 10 milliGRAM(s) IV Push every 8 hours  metoprolol succinate ER 50 milliGRAM(s) Oral daily  mirtazapine 7.5 milliGRAM(s) Oral at bedtime  multivitamin 1 Tablet(s) Oral daily  pantoprazole    Tablet 40 milliGRAM(s) Oral every 12 hours  potassium phosphate / sodium phosphate Tablet (K-PHOS No. 2) 1 Tablet(s) Oral two times a day  sertraline 100 milliGRAM(s) Oral daily  sodium chloride 1 Gram(s) Oral every 12 hours  sodium chloride 0.9% lock flush 3 milliLiter(s) IV Push every 8 hours  spironolactone 25 milliGRAM(s) Oral daily  vancomycin    Solution 125 milliGRAM(s) Oral every 12 hours    MEDICATIONS  (PRN):  acetaminophen     Tablet .. 650 milliGRAM(s) Oral every 6 hours PRN Temp greater or equal to 38C (100.4F), Mild Pain (1 - 3), Moderate Pain (4 - 6)      CAPILLARY BLOOD GLUCOSE      POCT Blood Glucose.: 83 mg/dL (02 Jan 2022 21:51)  POCT Blood Glucose.: 110 mg/dL (02 Jan 2022 17:18)  POCT Blood Glucose.: 164 mg/dL (02 Jan 2022 12:44)  POCT Blood Glucose.: 93 mg/dL (02 Jan 2022 07:54)    I&O's Summary    02 Jan 2022 07:01  -  03 Jan 2022 07:00  --------------------------------------------------------  IN: 880 mL / OUT: 2200 mL / NET: -1320 mL        PHYSICAL EXAM:  Vital Signs Last 24 Hrs  T(C): 36.3 (03 Jan 2022 04:36), Max: 36.6 (03 Jan 2022 00:20)  T(F): 97.3 (03 Jan 2022 04:36), Max: 97.8 (03 Jan 2022 00:20)  HR: 90 (03 Jan 2022 04:36) (87 - 95)  BP: 88/67 (02 Jan 2022 17:40) (88/67 - 97/69)  BP(mean): 75 (03 Jan 2022 04:36) (66 - 79)  RR: 18 (03 Jan 2022 04:36) (18 - 18)  SpO2: 96% (03 Jan 2022 04:36) (96% - 100%)        LABS:                        9.4    6.74  )-----------( 196      ( 02 Jan 2022 06:11 )             29.7     01-02    133<L>  |  101  |  17  ----------------------------<  110<H>  4.2   |  19<L>  |  0.71    Ca    9.4      02 Jan 2022 06:11  Phos  3.6     01-02  Mg     1.7     01-02    TPro  8.8<H>  /  Alb  3.4  /  TBili  0.3  /  DBili  x   /  AST  17  /  ALT  11  /  AlkPhos  131<H>  01-02                RADIOLOGY & ADDITIONAL TESTS:  Results Reviewed:   Imaging Personally Reviewed:  Electrocardiogram Personally Reviewed:    COORDINATION OF CARE:  Care Discussed with Consultants/Other Providers [Y/N]:  Prior or Outpatient Records Reviewed [Y/N]:   PROGRESS NOTE:   Patient is a 66y old  Male who presents with a chief complaint of COVID (+) with AMS and hypotension (02 Jan 2022 13:29)      SUBJECTIVE / OVERNIGHT EVENTS:  NAEO. Pt w/o complaints this AM. Denies f/c, sob, cough, cp, palpitations, n/v, abdominal pain, diarrhea/constipation, leg swelling.       MEDICATIONS  (STANDING):  aspirin  chewable 81 milliGRAM(s) Oral daily  baclofen 5 milliGRAM(s) Oral every 8 hours  cefepime   IVPB 2000 milliGRAM(s) IV Intermittent every 8 hours  chlorhexidine 2% Cloths 1 Application(s) Topical <User Schedule>  dextrose 50% Injectable 50 milliLiter(s) IV Push every 15 minutes  dextrose 50% Injectable 25 milliLiter(s) IV Push every 15 minutes  enoxaparin Injectable 30 milliGRAM(s) SubCutaneous daily  insulin lispro (ADMELOG) corrective regimen sliding scale   SubCutaneous three times a day before meals  insulin lispro (ADMELOG) corrective regimen sliding scale   SubCutaneous at bedtime  methimazole 10 milliGRAM(s) Oral daily  metoclopramide Injectable 10 milliGRAM(s) IV Push every 8 hours  metoprolol succinate ER 50 milliGRAM(s) Oral daily  mirtazapine 7.5 milliGRAM(s) Oral at bedtime  multivitamin 1 Tablet(s) Oral daily  pantoprazole    Tablet 40 milliGRAM(s) Oral every 12 hours  potassium phosphate / sodium phosphate Tablet (K-PHOS No. 2) 1 Tablet(s) Oral two times a day  sertraline 100 milliGRAM(s) Oral daily  sodium chloride 1 Gram(s) Oral every 12 hours  sodium chloride 0.9% lock flush 3 milliLiter(s) IV Push every 8 hours  spironolactone 25 milliGRAM(s) Oral daily  vancomycin    Solution 125 milliGRAM(s) Oral every 12 hours    MEDICATIONS  (PRN):  acetaminophen     Tablet .. 650 milliGRAM(s) Oral every 6 hours PRN Temp greater or equal to 38C (100.4F), Mild Pain (1 - 3), Moderate Pain (4 - 6)      CAPILLARY BLOOD GLUCOSE      POCT Blood Glucose.: 83 mg/dL (02 Jan 2022 21:51)  POCT Blood Glucose.: 110 mg/dL (02 Jan 2022 17:18)  POCT Blood Glucose.: 164 mg/dL (02 Jan 2022 12:44)  POCT Blood Glucose.: 93 mg/dL (02 Jan 2022 07:54)    I&O's Summary    02 Jan 2022 07:01  -  03 Jan 2022 07:00  --------------------------------------------------------  IN: 880 mL / OUT: 2200 mL / NET: -1320 mL        PHYSICAL EXAM:  Vital Signs Last 24 Hrs  T(C): 36.3 (03 Jan 2022 04:36), Max: 36.6 (03 Jan 2022 00:20)  T(F): 97.3 (03 Jan 2022 04:36), Max: 97.8 (03 Jan 2022 00:20)  HR: 90 (03 Jan 2022 04:36) (87 - 95)  BP: 88/67 (02 Jan 2022 17:40) (88/67 - 97/69)  BP(mean): 75 (03 Jan 2022 04:36) (66 - 79)  RR: 18 (03 Jan 2022 04:36) (18 - 18)  SpO2: 96% (03 Jan 2022 04:36) (96% - 100%)        LABS:                        9.4    6.74  )-----------( 196      ( 02 Jan 2022 06:11 )             29.7     01-02    133<L>  |  101  |  17  ----------------------------<  110<H>  4.2   |  19<L>  |  0.71    Ca    9.4      02 Jan 2022 06:11  Phos  3.6     01-02  Mg     1.7     01-02    TPro  8.8<H>  /  Alb  3.4  /  TBili  0.3  /  DBili  x   /  AST  17  /  ALT  11  /  AlkPhos  131<H>  01-02                RADIOLOGY & ADDITIONAL TESTS:  Results Reviewed:   Imaging Personally Reviewed:  Electrocardiogram Personally Reviewed:    COORDINATION OF CARE:  Care Discussed with Consultants/Other Providers [Y/N]:  Prior or Outpatient Records Reviewed [Y/N]:   PROGRESS NOTE:   Patient is a 66y old  Male who presents with a chief complaint of COVID (+) with AMS and hypotension (02 Jan 2022 13:29)      SUBJECTIVE / OVERNIGHT EVENTS:  NAEO. Pt w/o complaints this AM. Denies f/c, sob, cough, cp, palpitations, n/v, abdominal pain, diarrhea/constipation, leg swelling.       MEDICATIONS  (STANDING):  aspirin  chewable 81 milliGRAM(s) Oral daily  baclofen 5 milliGRAM(s) Oral every 8 hours  cefepime   IVPB 2000 milliGRAM(s) IV Intermittent every 8 hours  chlorhexidine 2% Cloths 1 Application(s) Topical <User Schedule>  dextrose 50% Injectable 50 milliLiter(s) IV Push every 15 minutes  dextrose 50% Injectable 25 milliLiter(s) IV Push every 15 minutes  enoxaparin Injectable 30 milliGRAM(s) SubCutaneous daily  insulin lispro (ADMELOG) corrective regimen sliding scale   SubCutaneous three times a day before meals  insulin lispro (ADMELOG) corrective regimen sliding scale   SubCutaneous at bedtime  methimazole 10 milliGRAM(s) Oral daily  metoclopramide Injectable 10 milliGRAM(s) IV Push every 8 hours  metoprolol succinate ER 50 milliGRAM(s) Oral daily  mirtazapine 7.5 milliGRAM(s) Oral at bedtime  multivitamin 1 Tablet(s) Oral daily  pantoprazole    Tablet 40 milliGRAM(s) Oral every 12 hours  potassium phosphate / sodium phosphate Tablet (K-PHOS No. 2) 1 Tablet(s) Oral two times a day  sertraline 100 milliGRAM(s) Oral daily  sodium chloride 1 Gram(s) Oral every 12 hours  sodium chloride 0.9% lock flush 3 milliLiter(s) IV Push every 8 hours  spironolactone 25 milliGRAM(s) Oral daily  vancomycin    Solution 125 milliGRAM(s) Oral every 12 hours    MEDICATIONS  (PRN):  acetaminophen     Tablet .. 650 milliGRAM(s) Oral every 6 hours PRN Temp greater or equal to 38C (100.4F), Mild Pain (1 - 3), Moderate Pain (4 - 6)      CAPILLARY BLOOD GLUCOSE  POCT Blood Glucose.: 83 mg/dL (02 Jan 2022 21:51)  POCT Blood Glucose.: 110 mg/dL (02 Jan 2022 17:18)  POCT Blood Glucose.: 164 mg/dL (02 Jan 2022 12:44)  POCT Blood Glucose.: 93 mg/dL (02 Jan 2022 07:54)      I&O's Summary    02 Jan 2022 07:01  -  03 Jan 2022 07:00  --------------------------------------------------------  IN: 880 mL / OUT: 2200 mL / NET: -1320 mL      PHYSICAL EXAM:  Vital Signs Last 24 Hrs  T(C): 36.3 (03 Jan 2022 04:36), Max: 36.6 (03 Jan 2022 00:20)  T(F): 97.3 (03 Jan 2022 04:36), Max: 97.8 (03 Jan 2022 00:20)  HR: 90 (03 Jan 2022 04:36) (87 - 95)  BP: 88/67 (02 Jan 2022 17:40) (88/67 - 97/69)  BP(mean): 75 (03 Jan 2022 04:36) (66 - 79)  RR: 18 (03 Jan 2022 04:36) (18 - 18)  SpO2: 96% (03 Jan 2022 04:36) (96% - 100%)    CONSTITUTIONAL: NAD, well-developed  HEENT: NC/AT, EOMI, tracheostomy present with overlying dressing, c/d/i  RESPIRATORY: No increased work of breathing, CTAB, no wheezes or crackles appreciated  CARDIOVASCULAR: rhythm inaudible, LVAD hum present  ABDOMEN: soft, NT, ND, bowel sounds present  EXTREMITIES: No LE edema  MUSCULOSKELETAL: no joint swelling or tenderness to palpation  NEURO: A&Ox3, moving all extremities      LABS:                        9.4    6.74  )-----------( 196      ( 02 Jan 2022 06:11 )             29.7     01-02    133<L>  |  101  |  17  ----------------------------<  110<H>  4.2   |  19<L>  |  0.71    Ca    9.4      02 Jan 2022 06:11  Phos  3.6     01-02  Mg     1.7     01-02    TPro  8.8<H>  /  Alb  3.4  /  TBili  0.3  /  DBili  x   /  AST  17  /  ALT  11  /  AlkPhos  131<H>  01-02

## 2022-01-03 NOTE — SWALLOW VFSS/MBS ASSESSMENT ADULT - ORAL PHASE
mildly delayed oral transit/reduced anterior-posterior transport; single instance of uncontrolled spillover of moderate amount of bolus to pyriform sinus on initial trial uncontrolled spillover of trace amount of bolus to pyriform sinus Piecemeal deglutition (x2)/Delayed oral transit time

## 2022-01-03 NOTE — SWALLOW VFSS/MBS ASSESSMENT ADULT - DELAYED INITIATION OF THE PHARYNGEAL SWALLOW (IN SECONDS)
x3 second delay on initial trial; timely on all other trials x0-1 second delay initiated at pyriform sinus

## 2022-01-03 NOTE — SWALLOW VFSS/MBS ASSESSMENT ADULT - DIAGNOSTIC IMPRESSIONS
67 y/o M presenting from [facility] on 12/13 with AMS, hypotension, and tachycardia, found to be COVID (+), now off isolation precautions. Pt s/p RRT 1/1 for hypoxia to 85% iso possible aspiration. Pt was seen today for Modified Barium Swallow Study (MBS) which revealed mild kenroy-pharyngeal dysphagia. The oral stage was characterized by mildly prolonged, though generally efficient mastication of hard solids, piecemeal deglutition (x2), and intermittent uncontrolled spillover with puree trials and thin liquids. The pharyngeal stage was characterized by intermittent delayed trigger of pharyngeal swallow (up to 3 seconds). NO instances of penetration or aspiration with thin liquids, puree, or hard solids. Trace pharyngeal residue. Disorders: mildly reduced lingual strength/ROM/Rate of motion, reduced BOT to posterior pharyngeal wall contact, and delay in trigger of the swallow reflex.

## 2022-01-03 NOTE — SWALLOW VFSS/MBS ASSESSMENT ADULT - SLP PERTINENT HISTORY OF CURRENT PROBLEM
65 y/o M with PMH of ACC/AHA stage D HF due to NICM HM2 LVAD , TV annuloplasty ring 9/12/17 as destination therapy due to severe peripheral artery disease with significant stenosis  SIADH, Depression, CKD-3 with hyperkalemia, past E. coli UTIs, driveline drainage (1/7/21) and COVID-19 (back in April 2020). Recurrent syncope post LVAD s/p ILR, and chronic abdominal pain and was noted to have a prolong hospital course (6/14-11/16). During that time he has multiple infections and now remains on suppressive antibiotics. Underwent SMA stent with Vascular in 10/2021, now tolerating PO diet, perc dawn drain placement and mutiple GI bleeds. Ultimately was trach and discharged to rehab to get stronger. Patient returned from rehab for trach decannulation, which was removed on 12/2. Now presented from [facility] on 12/13 with AMS, hypotensive, tachycardic found to be COVID (+).

## 2022-01-03 NOTE — PROGRESS NOTE ADULT - PROBLEM SELECTOR PLAN 1
Patient with suspected aspiration overnight, had episode of hypoxia with increased secretions  - no gross evidence of ongoing infection at this time  - patient currently comfortable on RA  - possible that patient may have had aspiration event  - S/S re-evaluation, patient recommended for minced/moist diet  - plan for MBS on 1/3  - CXR 1/2/22 - clear lungs; no evidence of pulm disease. Patient with suspected aspiration overnight, had episode of hypoxia with increased secretions  - no gross evidence of ongoing infection at this time  - patient currently comfortable on RA  - possible that patient may have had aspiration event  - CXR negative  - MBS passed = pt now on regular diet

## 2022-01-04 NOTE — PROGRESS NOTE ADULT - PROBLEM SELECTOR PLAN 2
- COVID PCR + 12/13. Recent CXR clear, respiratory status stable on room air   - s/p monoclonal antibodies Regeneron   - s/p 5 day course of Remdesivir  - s/p 10 day course of Dexamethasone  - repeat COVID PCR 12/26, 12/28 positive  - COVID negative 1/3/2022 History of recurrent pneumonia and bacteremia; With stenotrophomonas in prior sputum cultures and enterobacter and serratia in blood in October 2021  - Blood cultures 12/13 + Serratia  - RUQ sonogram negative  - Cefepime 2g IV q8 (12/13-12/27)  - Bactrim started (12/27-12/30)  - Cefepime restarted (12/30- ) for fever on bactrim  - repeat bcx NGTD  - as per ID, will need suppressive treatment of bacteremia x2weeks with quinolone on dc

## 2022-01-04 NOTE — PROGRESS NOTE ADULT - ASSESSMENT
67 y/o M with a history of Stage D NICM s/p HM2 LVAD in 9/2017 at  (due to severe PAD) with TV ring, multiple GI bleeds, thus maintained off AC, CKD 3prior COVID-19 infection in 4/2020, recurrent syncope post LVAD s/p ILR, s/p prolonged complex hospitalization from 6/14-11/16/2021 initially admitted with abdominal pain complicated by GIB, respiratory failure s/p trach, multiple infections, s/p cholecystotomy tube and SMA stent 10/2021, ultimately discharged to [facility] rehab and then returned to [facility] for trach decannulation 12/2 who now presents with recurrent COVID-19 infection, initially in distributive shock. 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     He had elevated inflammatory markers which are now improving. He received monoclonal antibodies and was started on remdesivir and dexamethasone. He's afebrile with BCx from 12/18 and 12/30 NGTD. He had an episode of hypoxia noted 1/1, possibly in the setting of aspiration. He is currently saturating well on RA. He had runs of MMVT in setting of hypokalemia and being off his beta blocker which has since resolved. He currently appears well compensated from HF perspective. His LDH is stable. His LVAD is functioning well without s/s of pump malfunction.     He is medically ready for discharge and are assessing options for discharge to rehab. He is now COVID negative but has no accepting center. Tentative plan for discharge to [facility] or home when optimized from PT perspective (likely next week)

## 2022-01-04 NOTE — PROGRESS NOTE ADULT - PROBLEM SELECTOR PLAN 3
- History of recurrent pneumonia and bacteremia  - With stenotrophomonas in prior sputum cultures and enterobacter and serratia in blood in October 2021  - Blood cultures 12/13 + Serratia  - RUQ sonogram negative  - Cefepime 2g IV q8 (12/13-12/27)  - Bactrim started (12/27-12/30)  - Cefepime restarted (12/30- ) for fever on bactrim  - repeat bcx NGTD  - as per ID, will need suppressive treatment of bacteremia x2weeks with quinolone on dc Patient with suspected aspiration overnight, had episode of hypoxia with increased secretions  - no gross evidence of ongoing infection at this time  - patient currently comfortable on RA  - possible that patient may have had aspiration event  - CXR negative  - MBS passed (1/3/22) = pt now on regular diet

## 2022-01-04 NOTE — PROGRESS NOTE ADULT - ASSESSMENT
64 yo man with a history of VAD placement history of COVID infection in April 2020, history of a prolonged hospitalization in 2021 with recurrent sepsis, pneumonia, intubated/trach x2 cycles , chronic gall bladder disease s/p perc dawn, SMA ischemia requiring a stent placement, cachexia who was discharged to rehab but is presented from [facility] on 12/13 with AMS, hypotensive, tachycardic found to be COVID (+). Pt lethargic and mumbling words required ICU admission and pressor support now titrated off and downgraded to the floor. Pending dispo to [facility]. 66 yo man with a history of VAD placement history of COVID infection in April 2020, history of a prolonged hospitalization in 2021 with recurrent sepsis, pneumonia, intubated/trach x2 cycles , chronic gall bladder disease s/p perc dawn, SMA ischemia requiring a stent placement, cachexia who was discharged to rehab but is presented from [facility] on 12/13 with AMS, hypotensive, tachycardic found to be COVID (+). Pt lethargic and mumbling words required ICU admission and pressor support now titrated off and downgraded to the floor. [facility] no longer an option d/t unavailability of rehab taking pts w/ LVAD. Pt to be optimized by PT before going home.

## 2022-01-04 NOTE — PROGRESS NOTE ADULT - PROBLEM SELECTOR PLAN 7
- Per previous records - Endocrine consulted for management of hyperthyroidism in the setting of recent amiodarone use and multiple IV contrast studies. 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 Unable to get a thyroid US due to his trach.   - continue methimazole 10mg QD  - FT4 and TSH wnl Per previous records - Endocrine consulted for management of hyperthyroidism in the setting of recent amiodarone use and multiple IV contrast studies. 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 Unable to get a thyroid US due to his trach.   -continue methimazole 10mg QD  -FT4 and TSH wnl

## 2022-01-04 NOTE — PROGRESS NOTE ADULT - PROBLEM SELECTOR PLAN 1
Patient with suspected aspiration overnight, had episode of hypoxia with increased secretions  - no gross evidence of ongoing infection at this time  - patient currently comfortable on RA  - possible that patient may have had aspiration event  - CXR negative  - MBS passed = pt now on regular diet COVID PCR + 12/13. Recent CXR clear, respiratory status stable on room air; s/p monoclonal antibodies Regeneron, s/p 5 day course of Remdesivir, s/p 10 day course of Dexamethasone  -repeat COVID PCR 12/26, 12/28 positive  -COVID negative 1/3/2022

## 2022-01-04 NOTE — PROGRESS NOTE ADULT - PROBLEM SELECTOR PLAN 9
- Diet: Regular  - DVT: lovenox 30mg QD  - Dispo: pending placement in COVID positive/LVAD capable [facility] facility -Diet: Regular  -DVT: lovenox 30mg QD  -Dispo: home w/ PT once pt optimized; PT arranged to come by daily.

## 2022-01-04 NOTE — PROGRESS NOTE ADULT - PROBLEM SELECTOR PLAN 6
- chronic and typically hypovolemic due to poor PO intake  - if worsening tomorrow then administer 1 L NS

## 2022-01-04 NOTE — PROGRESS NOTE ADULT - ATTENDING COMMENTS
sitting in chair comfortably  +trach stoma dressing  LVAD  ambulates with assistance  await [facility]  d/w bedside RN

## 2022-01-04 NOTE — PROGRESS NOTE ADULT - SUBJECTIVE AND OBJECTIVE BOX
Subjective:  No overnight events    Medications:  acetaminophen     Tablet .. 650 milliGRAM(s) Oral every 6 hours PRN  aspirin  chewable 81 milliGRAM(s) Oral daily  baclofen 5 milliGRAM(s) Oral every 8 hours  cefepime   IVPB 2000 milliGRAM(s) IV Intermittent every 8 hours  chlorhexidine 2% Cloths 1 Application(s) Topical <User Schedule>  cholecalciferol 1000 Unit(s) Oral daily  dextrose 50% Injectable 50 milliLiter(s) IV Push every 15 minutes  dextrose 50% Injectable 25 milliLiter(s) IV Push every 15 minutes  enoxaparin Injectable 30 milliGRAM(s) SubCutaneous daily  insulin lispro (ADMELOG) corrective regimen sliding scale   SubCutaneous three times a day before meals  insulin lispro (ADMELOG) corrective regimen sliding scale   SubCutaneous at bedtime  methimazole 10 milliGRAM(s) Oral daily  metoclopramide Injectable 10 milliGRAM(s) IV Push every 8 hours  metoprolol succinate ER 50 milliGRAM(s) Oral daily  mirtazapine 7.5 milliGRAM(s) Oral at bedtime  multivitamin 1 Tablet(s) Oral daily  pantoprazole    Tablet 40 milliGRAM(s) Oral every 12 hours  potassium phosphate / sodium phosphate Tablet (K-PHOS No. 2) 1 Tablet(s) Oral two times a day  sertraline 100 milliGRAM(s) Oral daily  sodium chloride 1 Gram(s) Oral every 12 hours  sodium chloride 0.9% lock flush 3 milliLiter(s) IV Push every 8 hours  spironolactone 25 milliGRAM(s) Oral daily  vancomycin    Solution 125 milliGRAM(s) Oral every 12 hours    Vitals:  T(C): 36.2 (22 @ 05:36), Max: 36.6 (22 @ 00:33)  HR: 91 (22 @ 17:00) (91 - 98)  BP: 96/74 (22 @ 17:00) (85/60 - 96/74)  BP(mean): 81 (22 @ 17:00) (68 - 82)  RR: 18 (22 @ 12:50) (18 - 18)  SpO2: 100% (22 @ 12:50) (97% - 100%)    Daily     Daily Weight in k.8 (2022 08:03)        I&O's Summary    2022 07:01  -  2022 07:00  --------------------------------------------------------  IN: 400 mL / OUT: 750 mL / NET: -350 mL    2022 07:01  -  2022 17:36  --------------------------------------------------------  IN: 0 mL / OUT: 400 mL / NET: -400 mL        Physical Exam:  Appearance: Frail, no distress   HEENT: JVP non elevated  Cardiovascular: VAD hums   Respiratory: Clear to auscultation bilaterally  Gastrointestinal: Soft, per dawn tube with green fluid in bag   Skin: no skin lesions  Neurologic: Non-focal  Extremities: No LE edema, warm and well perfused  Psychiatry: depressed affect     LVAD interrogation   Flow: 5.5  Speed: 9200  PI: 4.8  Power: 5.7   Alarms: none  Changes to device programming: none     Labs:                        11.5   5.03  )-----------( 215      ( 2022 09:55 )             35.6     01-04    129<L>  |  94<L>  |  19  ----------------------------<  113<H>  4.1   |  21<L>  |  0.75    Ca    9.7      2022 09:55  Phos  3.5     01-04  Mg     1.6     -    TPro  9.7<H>  /  Alb  3.8  /  TBili  0.3  /  DBili  x   /  AST  21  /  ALT  16  /  AlkPhos  142<H>  01-04                Lactate Dehydrogenase, Serum: 218 U/L ( @ 09:49)  Lactate Dehydrogenase, Serum: 195 U/L ( @ 06:11)

## 2022-01-04 NOTE — PROGRESS NOTE ADULT - SUBJECTIVE AND OBJECTIVE BOX
PROGRESS NOTE:   Patient is a 66y old  Male who presents with a chief complaint of COVID (+) with AMS and hypotension (03 Jan 2022 07:24)      SUBJECTIVE / OVERNIGHT EVENTS:    ADDITIONAL REVIEW OF SYSTEMS:    MEDICATIONS  (STANDING):  aspirin  chewable 81 milliGRAM(s) Oral daily  baclofen 5 milliGRAM(s) Oral every 8 hours  cefepime   IVPB 2000 milliGRAM(s) IV Intermittent every 8 hours  chlorhexidine 2% Cloths 1 Application(s) Topical <User Schedule>  cholecalciferol 1000 Unit(s) Oral daily  dextrose 50% Injectable 50 milliLiter(s) IV Push every 15 minutes  dextrose 50% Injectable 25 milliLiter(s) IV Push every 15 minutes  enoxaparin Injectable 30 milliGRAM(s) SubCutaneous daily  insulin lispro (ADMELOG) corrective regimen sliding scale   SubCutaneous three times a day before meals  insulin lispro (ADMELOG) corrective regimen sliding scale   SubCutaneous at bedtime  methimazole 10 milliGRAM(s) Oral daily  metoclopramide Injectable 10 milliGRAM(s) IV Push every 8 hours  metoprolol succinate ER 50 milliGRAM(s) Oral daily  mirtazapine 7.5 milliGRAM(s) Oral at bedtime  multivitamin 1 Tablet(s) Oral daily  pantoprazole    Tablet 40 milliGRAM(s) Oral every 12 hours  potassium phosphate / sodium phosphate Tablet (K-PHOS No. 2) 1 Tablet(s) Oral two times a day  sertraline 100 milliGRAM(s) Oral daily  sodium chloride 1 Gram(s) Oral every 12 hours  sodium chloride 0.9% lock flush 3 milliLiter(s) IV Push every 8 hours  spironolactone 25 milliGRAM(s) Oral daily  vancomycin    Solution 125 milliGRAM(s) Oral every 12 hours    MEDICATIONS  (PRN):  acetaminophen     Tablet .. 650 milliGRAM(s) Oral every 6 hours PRN Temp greater or equal to 38C (100.4F), Mild Pain (1 - 3), Moderate Pain (4 - 6)      CAPILLARY BLOOD GLUCOSE      POCT Blood Glucose.: 96 mg/dL (03 Jan 2022 21:47)  POCT Blood Glucose.: 96 mg/dL (03 Jan 2022 17:17)  POCT Blood Glucose.: 100 mg/dL (03 Jan 2022 13:05)    I&O's Summary    03 Jan 2022 07:01  -  04 Jan 2022 07:00  --------------------------------------------------------  IN: 400 mL / OUT: 750 mL / NET: -350 mL    04 Jan 2022 07:01  -  04 Jan 2022 07:23  --------------------------------------------------------  IN: 0 mL / OUT: 400 mL / NET: -400 mL        PHYSICAL EXAM:  Vital Signs Last 24 Hrs  T(C): 36.2 (04 Jan 2022 05:36), Max: 36.6 (04 Jan 2022 00:33)  T(F): 97.2 (04 Jan 2022 05:36), Max: 97.8 (04 Jan 2022 00:33)  HR: 94 (04 Jan 2022 05:36) (88 - 94)  BP: 91/60 (03 Jan 2022 18:00) (88/68 - 101/75)  BP(mean): 82 (04 Jan 2022 05:36) (70 - 84)  RR: 18 (04 Jan 2022 05:36) (18 - 18)  SpO2: 98% (04 Jan 2022 05:36) (94% - 99%)        LABS:                        9.8    5.59  )-----------( 214      ( 03 Jan 2022 09:49 )             30.3     01-03    130<L>  |  95<L>  |  14  ----------------------------<  116<H>  4.2   |  20<L>  |  0.63    Ca    9.5      03 Jan 2022 09:49  Phos  3.4     01-03  Mg     1.5     01-03    TPro  9.2<H>  /  Alb  3.7  /  TBili  0.2  /  DBili  x   /  AST  17  /  ALT  14  /  AlkPhos  132<H>  01-03   PROGRESS NOTE:   Patient is a 66y old  Male who presents with a chief complaint of COVID (+) with AMS and hypotension (03 Jan 2022 07:24)      SUBJECTIVE / OVERNIGHT EVENTS:  NAEO. Pt stable w/o any complaints. Explained about rehab issues and possibly going home w/ PT. Pt in agreement. Denies f/c, sob, cough, cp, palpitations, n/v, abd pain, diarrhea/constipation, leg swelling.       MEDICATIONS  (STANDING):  aspirin  chewable 81 milliGRAM(s) Oral daily  baclofen 5 milliGRAM(s) Oral every 8 hours  cefepime   IVPB 2000 milliGRAM(s) IV Intermittent every 8 hours  chlorhexidine 2% Cloths 1 Application(s) Topical <User Schedule>  cholecalciferol 1000 Unit(s) Oral daily  dextrose 50% Injectable 50 milliLiter(s) IV Push every 15 minutes  dextrose 50% Injectable 25 milliLiter(s) IV Push every 15 minutes  enoxaparin Injectable 30 milliGRAM(s) SubCutaneous daily  insulin lispro (ADMELOG) corrective regimen sliding scale   SubCutaneous three times a day before meals  insulin lispro (ADMELOG) corrective regimen sliding scale   SubCutaneous at bedtime  methimazole 10 milliGRAM(s) Oral daily  metoclopramide Injectable 10 milliGRAM(s) IV Push every 8 hours  metoprolol succinate ER 50 milliGRAM(s) Oral daily  mirtazapine 7.5 milliGRAM(s) Oral at bedtime  multivitamin 1 Tablet(s) Oral daily  pantoprazole    Tablet 40 milliGRAM(s) Oral every 12 hours  potassium phosphate / sodium phosphate Tablet (K-PHOS No. 2) 1 Tablet(s) Oral two times a day  sertraline 100 milliGRAM(s) Oral daily  sodium chloride 1 Gram(s) Oral every 12 hours  sodium chloride 0.9% lock flush 3 milliLiter(s) IV Push every 8 hours  spironolactone 25 milliGRAM(s) Oral daily  vancomycin    Solution 125 milliGRAM(s) Oral every 12 hours    MEDICATIONS  (PRN):  acetaminophen     Tablet .. 650 milliGRAM(s) Oral every 6 hours PRN Temp greater or equal to 38C (100.4F), Mild Pain (1 - 3), Moderate Pain (4 - 6)      CAPILLARY BLOOD GLUCOSE  POCT Blood Glucose.: 96 mg/dL (03 Jan 2022 21:47)  POCT Blood Glucose.: 96 mg/dL (03 Jan 2022 17:17)  POCT Blood Glucose.: 100 mg/dL (03 Jan 2022 13:05)      I&O's Summary    03 Jan 2022 07:01  -  04 Jan 2022 07:00  --------------------------------------------------------  IN: 400 mL / OUT: 750 mL / NET: -350 mL    04 Jan 2022 07:01  -  04 Jan 2022 07:23  --------------------------------------------------------  IN: 0 mL / OUT: 400 mL / NET: -400 mL      PHYSICAL EXAM:  Vital Signs Last 24 Hrs  T(C): 36.2 (04 Jan 2022 05:36), Max: 36.6 (04 Jan 2022 00:33)  T(F): 97.2 (04 Jan 2022 05:36), Max: 97.8 (04 Jan 2022 00:33)  HR: 94 (04 Jan 2022 05:36) (88 - 94)  BP: 91/60 (03 Jan 2022 18:00) (88/68 - 101/75)  BP(mean): 82 (04 Jan 2022 05:36) (70 - 84)  RR: 18 (04 Jan 2022 05:36) (18 - 18)  SpO2: 98% (04 Jan 2022 05:36) (94% - 99%)    CONSTITUTIONAL: NAD, well-developed  HEENT: NC/AT, EOMI, tracheostomy present with overlying dressing, c/d/i  RESPIRATORY: No increased work of breathing, CTAB, no wheezes or crackles appreciated  CARDIOVASCULAR: rhythm inaudible, LVAD hum present  ABDOMEN: soft, NT, ND, bowel sounds present  EXTREMITIES: No LE edema  MUSCULOSKELETAL: no joint swelling or tenderness to palpation  NEURO: A&Ox3, moving all extremities      LABS:                        9.8    5.59  )-----------( 214      ( 03 Jan 2022 09:49 )             30.3     01-03    130<L>  |  95<L>  |  14  ----------------------------<  116<H>  4.2   |  20<L>  |  0.63    Ca    9.5      03 Jan 2022 09:49  Phos  3.4     01-03  Mg     1.5     01-03    TPro  9.2<H>  /  Alb  3.7  /  TBili  0.2  /  DBili  x   /  AST  17  /  ALT  14  /  AlkPhos  132<H>  01-03

## 2022-01-04 NOTE — PROGRESS NOTE ADULT - PROBLEM SELECTOR PLAN 2
- Dr. Prater, ID following  - COVID PCR negative as of 1/3  - s/p monoclonal antibodies, remdesivir, and dexamethasone  - hx of Serratia bacteremia- planned to be on chronic suppressive bactrim but switched to cefepime for fevers 12/30. blood cultures negative and likely an aspiration event so please switch back to bactrim    - continue with Vanco PO for C. Diff ppx   - gallbladder US unremarkable

## 2022-01-05 NOTE — PROGRESS NOTE ADULT - ASSESSMENT
64 yo man with a history of VAD placement history of COVID infection in April 2020, history of a prolonged hospitalization in 2021 with recurrent sepsis, pneumonia, intubated/trach x2 cycles , chronic gall bladder disease s/p perc dawn, SMA ischemia requiring a stent placement, cachexia who was discharged to rehab but is presented from [facility] on 12/13 with AMS, hypotensive, tachycardic found to be COVID (+). Pt lethargic and mumbling words required ICU admission and pressor support now titrated off and downgraded to the floor. [facility] no longer an option d/t unavailability of rehab taking pts w/ LVAD. Pt to be optimized by PT before going home.  66 yo man with a history of VAD placement history of COVID infection in April 2020, history of a prolonged hospitalization in 2021 with recurrent sepsis, pneumonia, intubated/trach x2 cycles , chronic gall bladder disease s/p perc dawn, SMA ischemia requiring a stent placement, cachexia who was discharged to rehab but is presented from [facility] on 12/13 with AMS, hypotensive, tachycardic found to be COVID (+). Pt lethargic and mumbling words required ICU admission and pressor support now titrated off and downgraded to the floor. SW able to find [facility] that would accept LVAD.

## 2022-01-05 NOTE — PROGRESS NOTE ADULT - PROBLEM SELECTOR PLAN 3
Patient with suspected aspiration overnight, had episode of hypoxia with increased secretions  - no gross evidence of ongoing infection at this time  - patient currently comfortable on RA  - possible that patient may have had aspiration event  - CXR negative  - MBS passed (1/3/22) = pt now on regular diet

## 2022-01-05 NOTE — PROGRESS NOTE ADULT - ASSESSMENT
[facility]    BH Treatment Plan Acknowledgement    Kirsten Burroughs was involved in the treatment plan for this current admission, 5/23/2017.  Patient has seen and agrees to the Interdisciplinary Treatment Plan.  Kirsten Burroughs verbalizes that he/she will work with the treatment team to meet the Interdisciplinary Treatment Plan goals.    X____________________________________    Date/Time: _____________________    Patient/Legally Authorized Representative  X____________________________________    Date/Time: _____________________    Treatment Team Member   X____________________________________    Date/Time: _____________________       X____________________________________    Date/Time: _____________________    Patient/Legally Authorized Representative  X____________________________________    Date/Time: _____________________    Treatment Team Member  X____________________________________    Date/Time: _____________________       X____________________________________    Date/Time: _____________________    Patient/Legally Authorized Representative  X____________________________________    Date/Time: _____________________    Treatment Team Member  X____________________________________    Date/Time: _____________________         I have reviewed and agree with the proposed treatment plan that the treatment team and the patient have defined.    X____________________________________    Date/Time: _____________________    MD Signature (for Inpatient/Residential encounters only)    I was notified of my drug test results.    X____________________________________    Date/Time: _____________________    Patient/Legally Authorized Representative  X____________________________________    Date/Time: _____________________    Patient/Legally Authorized Representative    [id number]     66yo man with a history of VAD palcement, history of COVID infeciton in April 2020, history of a prolonged hospitalization in 2021 with recurrent sepsis, pneumonia, intubated/trach x2 cycles , chronic gall bladder disease s/p perc dawn, SMA ischemia reuiring a stent placement, cachecxia who was discharged to rehab but is presented from [facility] on 12/13 with AMS, hypotensive, tachycardic found to be COVID (+). Pt is lethargic and mumbling words required ICU admission and pressor support.    More awake, alert, interactive   oxygen nasal requirements with reasonable saturation    COVID infection-   confirmed second bout of COVID by documented PCR testing  Ideally, would be interested in sequencing the virus to determine strain (omicron vs delta)  Received a dose of monoclonal antibodies Regeneron product   Completed remdesivir therapy  Completed ten days of dexamethasone  Refuses most vaccines- but will need to wait three months  to receive COVID vaccine series post monoclonal  Will administer flu/pneumonia vaccines this admission prior to discharge if he will permit        Serratia Bacteremia:  History of recurrent pneumonia and bacteremias   With stenotrophomonas in prior sputum cultures and enterobacter and serratia in blood in October 2021  Blood cultures sent 12/13 growing serratia-    Cefepime to 2 gram IV q 8hr as strain is sensitive to Cefepime- completed treatment course   suppression with oral bactrim started and patient developed a fever ? bactrim related   repeat blood cultures for evidence of clearing demonstrated clearance of infection   RUQ sonogram done to reassess- gall bladder does not show acute cholecystitis  Hiccups- resolved    Source of serratia not entirely clear- differential GI translocation, biliary tree, lungs, other including LVAD      Prior severe C.diff infection   pre-emptive oral Vanco 125mg bid    Currently receiving oral Levaquin for suppressive therapy, limited oral options based upon recent sensitivity pattern of the serratia and hx of 'fever' with Bactrim. Would consider a rechallenge with Bactrim in the future    Morris Prater MD  [phone]  After 5pm/weekends [phone]

## 2022-01-05 NOTE — PROGRESS NOTE ADULT - PROBLEM SELECTOR PLAN 2
History of recurrent pneumonia and bacteremia; With stenotrophomonas in prior sputum cultures and enterobacter and serratia in blood in October 2021  - Blood cultures 12/13 + Serratia  - RUQ sonogram negative  - Cefepime 2g IV q8 (12/13-12/27)  - Bactrim started (12/27-12/30)  - Cefepime restarted (12/30- ) for fever on bactrim  - repeat bcx NGTD  - as per ID, will need suppressive treatment of bacteremia x2weeks with quinolone on dc COVID PCR + 12/13. Recent CXR clear, respiratory status stable on room air; s/p monoclonal antibodies Regeneron, s/p 5 day course of Remdesivir, s/p 10 day course of Dexamethasone  -repeat COVID PCR 12/26, 12/28 positive  -COVID negative 1/3/2022

## 2022-01-05 NOTE — PROGRESS NOTE ADULT - ATTENDING COMMENTS
no new issues  asymptomatic  chronic deconditioning  [facility] bed  destination LVAD - cleared by CHF for outpatient follow up.

## 2022-01-05 NOTE — PROGRESS NOTE ADULT - SUBJECTIVE AND OBJECTIVE BOX
PROGRESS NOTE:   Patient is a 66y old  Male who presents with a chief complaint of COVID (+) with AMS and hypotension (04 Jan 2022 17:35)      SUBJECTIVE / OVERNIGHT EVENTS:    ADDITIONAL REVIEW OF SYSTEMS:    MEDICATIONS  (STANDING):  aspirin  chewable 81 milliGRAM(s) Oral daily  baclofen 5 milliGRAM(s) Oral every 8 hours  cefepime   IVPB 2000 milliGRAM(s) IV Intermittent every 8 hours  chlorhexidine 2% Cloths 1 Application(s) Topical <User Schedule>  cholecalciferol 1000 Unit(s) Oral daily  dextrose 50% Injectable 50 milliLiter(s) IV Push every 15 minutes  dextrose 50% Injectable 25 milliLiter(s) IV Push every 15 minutes  enoxaparin Injectable 30 milliGRAM(s) SubCutaneous daily  insulin lispro (ADMELOG) corrective regimen sliding scale   SubCutaneous three times a day before meals  insulin lispro (ADMELOG) corrective regimen sliding scale   SubCutaneous at bedtime  methimazole 10 milliGRAM(s) Oral daily  metoclopramide Injectable 10 milliGRAM(s) IV Push every 8 hours  metoprolol succinate ER 50 milliGRAM(s) Oral daily  mirtazapine 7.5 milliGRAM(s) Oral at bedtime  multivitamin 1 Tablet(s) Oral daily  pantoprazole    Tablet 40 milliGRAM(s) Oral every 12 hours  potassium phosphate / sodium phosphate Tablet (K-PHOS No. 2) 1 Tablet(s) Oral two times a day  sertraline 100 milliGRAM(s) Oral daily  sodium chloride 1 Gram(s) Oral every 12 hours  sodium chloride 0.9% lock flush 3 milliLiter(s) IV Push every 8 hours  spironolactone 25 milliGRAM(s) Oral daily  vancomycin    Solution 125 milliGRAM(s) Oral every 12 hours    MEDICATIONS  (PRN):  acetaminophen     Tablet .. 650 milliGRAM(s) Oral every 6 hours PRN Temp greater or equal to 38C (100.4F), Mild Pain (1 - 3), Moderate Pain (4 - 6)      CAPILLARY BLOOD GLUCOSE      POCT Blood Glucose.: 127 mg/dL (04 Jan 2022 21:56)  POCT Blood Glucose.: 100 mg/dL (04 Jan 2022 16:54)  POCT Blood Glucose.: 102 mg/dL (04 Jan 2022 12:04)  POCT Blood Glucose.: 79 mg/dL (04 Jan 2022 07:52)    I&O's Summary    04 Jan 2022 07:01  -  05 Jan 2022 07:00  --------------------------------------------------------  IN: 0 mL / OUT: 900 mL / NET: -900 mL        PHYSICAL EXAM:  Vital Signs Last 24 Hrs  T(C): 36.6 (05 Jan 2022 04:30), Max: 36.6 (04 Jan 2022 20:27)  T(F): 97.9 (05 Jan 2022 04:30), Max: 97.9 (05 Jan 2022 01:00)  HR: 100 (05 Jan 2022 04:30) (91 - 100)  BP: 95/73 (05 Jan 2022 04:30) (85/60 - 96/74)  BP(mean): 80 (05 Jan 2022 04:30) (68 - 81)  RR: 18 (05 Jan 2022 04:30) (18 - 18)  SpO2: 100% (05 Jan 2022 04:30) (100% - 100%)        LABS:                        11.5   5.03  )-----------( 215      ( 04 Jan 2022 09:55 )             35.6     01-04    129<L>  |  94<L>  |  19  ----------------------------<  113<H>  4.1   |  21<L>  |  0.75    Ca    9.7      04 Jan 2022 09:55  Phos  3.5     01-04  Mg     1.6     01-04    TPro  9.7<H>  /  Alb  3.8  /  TBili  0.3  /  DBili  x   /  AST  21  /  ALT  16  /  AlkPhos  142<H>  01-04                RADIOLOGY & ADDITIONAL TESTS:  Results Reviewed:   Imaging Personally Reviewed:  Electrocardiogram Personally Reviewed:    COORDINATION OF CARE:  Care Discussed with Consultants/Other Providers [Y/N]:  Prior or Outpatient Records Reviewed [Y/N]:   PROGRESS NOTE:   Patient is a 66y old  Male who presents with a chief complaint of COVID (+) with AMS and hypotension (04 Jan 2022 17:35)      SUBJECTIVE / OVERNIGHT EVENTS:  NAEO. Pt stable w/o any complaints. Denies f/c, sob, cough, cp, palpitations, n/v, abd pain, diarrhea/constipation, leg swelling.       MEDICATIONS  (STANDING):  aspirin  chewable 81 milliGRAM(s) Oral daily  baclofen 5 milliGRAM(s) Oral every 8 hours  cefepime   IVPB 2000 milliGRAM(s) IV Intermittent every 8 hours  chlorhexidine 2% Cloths 1 Application(s) Topical <User Schedule>  cholecalciferol 1000 Unit(s) Oral daily  dextrose 50% Injectable 50 milliLiter(s) IV Push every 15 minutes  dextrose 50% Injectable 25 milliLiter(s) IV Push every 15 minutes  enoxaparin Injectable 30 milliGRAM(s) SubCutaneous daily  insulin lispro (ADMELOG) corrective regimen sliding scale   SubCutaneous three times a day before meals  insulin lispro (ADMELOG) corrective regimen sliding scale   SubCutaneous at bedtime  methimazole 10 milliGRAM(s) Oral daily  metoclopramide Injectable 10 milliGRAM(s) IV Push every 8 hours  metoprolol succinate ER 50 milliGRAM(s) Oral daily  mirtazapine 7.5 milliGRAM(s) Oral at bedtime  multivitamin 1 Tablet(s) Oral daily  pantoprazole    Tablet 40 milliGRAM(s) Oral every 12 hours  potassium phosphate / sodium phosphate Tablet (K-PHOS No. 2) 1 Tablet(s) Oral two times a day  sertraline 100 milliGRAM(s) Oral daily  sodium chloride 1 Gram(s) Oral every 12 hours  sodium chloride 0.9% lock flush 3 milliLiter(s) IV Push every 8 hours  spironolactone 25 milliGRAM(s) Oral daily  vancomycin    Solution 125 milliGRAM(s) Oral every 12 hours    MEDICATIONS  (PRN):  acetaminophen     Tablet .. 650 milliGRAM(s) Oral every 6 hours PRN Temp greater or equal to 38C (100.4F), Mild Pain (1 - 3), Moderate Pain (4 - 6)      CAPILLARY BLOOD GLUCOSE  POCT Blood Glucose.: 127 mg/dL (04 Jan 2022 21:56)  POCT Blood Glucose.: 100 mg/dL (04 Jan 2022 16:54)  POCT Blood Glucose.: 102 mg/dL (04 Jan 2022 12:04)  POCT Blood Glucose.: 79 mg/dL (04 Jan 2022 07:52)      I&O's Summary    04 Jan 2022 07:01  -  05 Jan 2022 07:00  --------------------------------------------------------  IN: 0 mL / OUT: 900 mL / NET: -900 mL      PHYSICAL EXAM:  Vital Signs Last 24 Hrs  T(C): 36.6 (05 Jan 2022 04:30), Max: 36.6 (04 Jan 2022 20:27)  T(F): 97.9 (05 Jan 2022 04:30), Max: 97.9 (05 Jan 2022 01:00)  HR: 100 (05 Jan 2022 04:30) (91 - 100)  BP: 95/73 (05 Jan 2022 04:30) (85/60 - 96/74)  BP(mean): 80 (05 Jan 2022 04:30) (68 - 81)  RR: 18 (05 Jan 2022 04:30) (18 - 18)  SpO2: 100% (05 Jan 2022 04:30) (100% - 100%)    CONSTITUTIONAL: NAD, well-developed  HEENT: NC/AT, EOMI, tracheostomy present with overlying dressing, c/d/i  RESPIRATORY: No increased work of breathing, CTAB, no wheezes or crackles appreciated  CARDIOVASCULAR: rhythm inaudible, LVAD hum present  ABDOMEN: soft, NT, ND, bowel sounds present  EXTREMITIES: No LE edema  MUSCULOSKELETAL: no joint swelling or tenderness to palpation  NEURO: A&Ox3, moving all extremities      LABS:                        11.5   5.03  )-----------( 215      ( 04 Jan 2022 09:55 )             35.6     01-04    129<L>  |  94<L>  |  19  ----------------------------<  113<H>  4.1   |  21<L>  |  0.75    Ca    9.7      04 Jan 2022 09:55  Phos  3.5     01-04  Mg     1.6     01-04    TPro  9.7<H>  /  Alb  3.8  /  TBili  0.3  /  DBili  x   /  AST  21  /  ALT  16  /  AlkPhos  142<H>  01-04 PROGRESS NOTE:   Patient is a 66y old  Male who presents with a chief complaint of COVID (+) with AMS and hypotension (04 Jan 2022 17:35)      SUBJECTIVE / OVERNIGHT EVENTS:  NAEO. Pt stable w/o any complaints. Denies f/c, sob, cough, cp, palpitations, n/v, abd pain, diarrhea/constipation, leg swelling.     Spoke in-depth w/ Dr. Prater, ID service, in regards to DC planning for pt. Pt will continue on life-long antibiotics suppression therapy w/ Levaquin. Vanc will also be continued as a ppx for c. diff. Also spoke w/ Dr. Alas, HF service, to verify Levaquin as an option for long-term suppression therapy which he stated would be appropriate after repeat EKG check.        MEDICATIONS  (STANDING):  aspirin  chewable 81 milliGRAM(s) Oral daily  baclofen 5 milliGRAM(s) Oral every 8 hours  cefepime   IVPB 2000 milliGRAM(s) IV Intermittent every 8 hours  chlorhexidine 2% Cloths 1 Application(s) Topical <User Schedule>  cholecalciferol 1000 Unit(s) Oral daily  dextrose 50% Injectable 50 milliLiter(s) IV Push every 15 minutes  dextrose 50% Injectable 25 milliLiter(s) IV Push every 15 minutes  enoxaparin Injectable 30 milliGRAM(s) SubCutaneous daily  insulin lispro (ADMELOG) corrective regimen sliding scale   SubCutaneous three times a day before meals  insulin lispro (ADMELOG) corrective regimen sliding scale   SubCutaneous at bedtime  methimazole 10 milliGRAM(s) Oral daily  metoclopramide Injectable 10 milliGRAM(s) IV Push every 8 hours  metoprolol succinate ER 50 milliGRAM(s) Oral daily  mirtazapine 7.5 milliGRAM(s) Oral at bedtime  multivitamin 1 Tablet(s) Oral daily  pantoprazole    Tablet 40 milliGRAM(s) Oral every 12 hours  potassium phosphate / sodium phosphate Tablet (K-PHOS No. 2) 1 Tablet(s) Oral two times a day  sertraline 100 milliGRAM(s) Oral daily  sodium chloride 1 Gram(s) Oral every 12 hours  sodium chloride 0.9% lock flush 3 milliLiter(s) IV Push every 8 hours  spironolactone 25 milliGRAM(s) Oral daily  vancomycin    Solution 125 milliGRAM(s) Oral every 12 hours    MEDICATIONS  (PRN):  acetaminophen     Tablet .. 650 milliGRAM(s) Oral every 6 hours PRN Temp greater or equal to 38C (100.4F), Mild Pain (1 - 3), Moderate Pain (4 - 6)      CAPILLARY BLOOD GLUCOSE  POCT Blood Glucose.: 127 mg/dL (04 Jan 2022 21:56)  POCT Blood Glucose.: 100 mg/dL (04 Jan 2022 16:54)  POCT Blood Glucose.: 102 mg/dL (04 Jan 2022 12:04)  POCT Blood Glucose.: 79 mg/dL (04 Jan 2022 07:52)      I&O's Summary    04 Jan 2022 07:01  -  05 Jan 2022 07:00  --------------------------------------------------------  IN: 0 mL / OUT: 900 mL / NET: -900 mL      PHYSICAL EXAM:  Vital Signs Last 24 Hrs  T(C): 36.6 (05 Jan 2022 04:30), Max: 36.6 (04 Jan 2022 20:27)  T(F): 97.9 (05 Jan 2022 04:30), Max: 97.9 (05 Jan 2022 01:00)  HR: 100 (05 Jan 2022 04:30) (91 - 100)  BP: 95/73 (05 Jan 2022 04:30) (85/60 - 96/74)  BP(mean): 80 (05 Jan 2022 04:30) (68 - 81)  RR: 18 (05 Jan 2022 04:30) (18 - 18)  SpO2: 100% (05 Jan 2022 04:30) (100% - 100%)    CONSTITUTIONAL: NAD, well-developed  HEENT: NC/AT, EOMI, tracheostomy present with overlying dressing, c/d/i  RESPIRATORY: No increased work of breathing, CTAB, no wheezes or crackles appreciated  CARDIOVASCULAR: rhythm inaudible, LVAD hum present  ABDOMEN: soft, NT, ND, bowel sounds present  EXTREMITIES: No LE edema  MUSCULOSKELETAL: no joint swelling or tenderness to palpation  NEURO: A&Ox3, moving all extremities      LABS:                        11.5   5.03  )-----------( 215      ( 04 Jan 2022 09:55 )             35.6     01-04    129<L>  |  94<L>  |  19  ----------------------------<  113<H>  4.1   |  21<L>  |  0.75    Ca    9.7      04 Jan 2022 09:55  Phos  3.5     01-04  Mg     1.6     01-04    TPro  9.7<H>  /  Alb  3.8  /  TBili  0.3  /  DBili  x   /  AST  21  /  ALT  16  /  AlkPhos  142<H>  01-04

## 2022-01-05 NOTE — PROGRESS NOTE ADULT - PROBLEM SELECTOR PLAN 9
-Diet: Regular  -DVT: lovenox 30mg QD  -Dispo: home w/ PT once pt optimized; PT arranged to come by daily. -Diet: Regular  -DVT: lovenox 30mg QD  -Dispo: [facility] (found facility accepting LVAD)

## 2022-01-05 NOTE — PROGRESS NOTE ADULT - PROBLEM SELECTOR PLAN 7
Per previous records - Endocrine consulted for management of hyperthyroidism in the setting of recent amiodarone use and multiple IV contrast studies. 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 Unable to get a thyroid US due to his trach.   -continue methimazole 10mg QD  -FT4 and TSH wnl

## 2022-01-05 NOTE — PROGRESS NOTE ADULT - PROBLEM SELECTOR PLAN 4
- hydralazine discontinued  - spironolactone 25 mg QD  - metoprolol succinate increased to 50 mg QD  - replete Mg to 2-2.5, K 4-4.5  - Daily PT  - Encourage oral intake and nutrition, salt tabs -hydralazine discontinued  -spironolactone 25 mg QD  -c/w metoprolol succinate 50 mg QD  -replete Mg to 2-2.5, K 4-4.5  -Daily PT  -Encourage oral intake and nutrition, salt tabs

## 2022-01-05 NOTE — PROGRESS NOTE ADULT - PROBLEM SELECTOR PLAN 1
COVID PCR + 12/13. Recent CXR clear, respiratory status stable on room air; s/p monoclonal antibodies Regeneron, s/p 5 day course of Remdesivir, s/p 10 day course of Dexamethasone  -repeat COVID PCR 12/26, 12/28 positive  -COVID negative 1/3/2022 History of recurrent pneumonia and bacteremia; With stenotrophomonas in prior sputum cultures and enterobacter and serratia in blood in October 2021  -Blood cultures 12/13 + Serratia  -RUQ sonogram negative  -Cefepime 2g IV q8 (12/13-12/27)  -Bactrim started (12/27-12/30)  -c/w Cefepime (12/30- ) for fever on bactrim until DC  -repeat bcx NGTD  -as per ID, will need suppressive treatment of bacteremia x2weeks with quinolone on dc History of recurrent pneumonia and bacteremia; With stenotrophomonas in prior sputum cultures and enterobacter and serratia in blood in October 2021  -Blood cultures 12/13 + Serratia  -RUQ sonogram negative  -Cefepime 2g IV q8 (12/13-12/27)  -Bactrim started (12/27-12/30)  -c/w Cefepime (12/30- ) until DC  -repeat bcx NGTD  -Spoke w/ Dr. Prater today concerning DC plan: History of recurrent pneumonia and bacteremia; With stenotrophomonas in prior sputum cultures and enterobacter and serratia in blood in October 2021  -Blood cultures 12/13 + Serratia  -RUQ sonogram negative  -Cefepime 2g IV q8 (12/13-12/27)  -Bactrim started (12/27-12/30); not a candidate as pt not tolerated  -c/w Cefepime (12/30- currently)  -repeat bcx NGTD  -Spoke w/ Dr. Prater, ID service, today concerning DC plan:    =pt will continue on lifelong Levaquin 500mg QD for suppression therapy    =PO vanc 125mg BID for c. diff ppx    =no longer candidate for bactrim as not tolerated by pt History of recurrent pneumonia and bacteremia; With stenotrophomonas in prior sputum cultures and enterobacter and serratia in blood in October 2021  -Blood cultures 12/13 + Serratia  -RUQ sonogram negative  -Cefepime 2g IV q8 (12/13-12/27)  -Bactrim started (12/27-12/30); not a candidate as pt not tolerated  -c/w Cefepime (12/30- currently)  -repeat bcx NGTD  -Spoke w/ Dr. Prater, ID service, today concerning DC plan:    =pt will continue on lifelong Levaquin 500mg QD for suppression therapy    =PO vanc 125mg BID for C. diff ppx    =no longer candidate for bactrim as not tolerated by pt

## 2022-01-05 NOTE — PROGRESS NOTE ADULT - SUBJECTIVE AND OBJECTIVE BOX
INFECTIOUS DISEASES FOLLOW UP-- Anitra Prater  [phone]    This is a follow up note for this  66yMale with  Sepsis  serratia bacteremia- cleared with IV Cefepime        ROS:  CONSTITUTIONAL:  No fever, good appetite  CARDIOVASCULAR:  No chest pain or palpitations  RESPIRATORY:  No dyspnea  GASTROINTESTINAL:  No nausea, vomiting, diarrhea, or abdominal pain  GENITOURINARY:  No dysuria  NEUROLOGIC:  No headache,     Allergies    Amiodarone Hydrochloride (Other (Severe))    Intolerances        ANTIBIOTICS/RELEVANT:  antimicrobials  levoFLOXacin  Tablet 500 milliGRAM(s) Oral every 24 hours  vancomycin    Solution 125 milliGRAM(s) Oral every 12 hours    immunologic:    OTHER:  acetaminophen     Tablet .. 650 milliGRAM(s) Oral every 6 hours PRN  aspirin  chewable 81 milliGRAM(s) Oral daily  baclofen 5 milliGRAM(s) Oral every 8 hours  chlorhexidine 2% Cloths 1 Application(s) Topical <User Schedule>  cholecalciferol 1000 Unit(s) Oral daily  dextrose 50% Injectable 50 milliLiter(s) IV Push every 15 minutes  dextrose 50% Injectable 25 milliLiter(s) IV Push every 15 minutes  enoxaparin Injectable 30 milliGRAM(s) SubCutaneous daily  insulin lispro (ADMELOG) corrective regimen sliding scale   SubCutaneous three times a day before meals  insulin lispro (ADMELOG) corrective regimen sliding scale   SubCutaneous at bedtime  methimazole 10 milliGRAM(s) Oral daily  metoclopramide Injectable 10 milliGRAM(s) IV Push every 8 hours  metoprolol succinate ER 50 milliGRAM(s) Oral daily  mirtazapine 7.5 milliGRAM(s) Oral at bedtime  multivitamin 1 Tablet(s) Oral daily  pantoprazole    Tablet 40 milliGRAM(s) Oral every 12 hours  potassium phosphate / sodium phosphate Tablet (K-PHOS No. 2) 1 Tablet(s) Oral two times a day  sertraline 100 milliGRAM(s) Oral daily  sodium chloride 1 Gram(s) Oral every 12 hours  sodium chloride 0.9% lock flush 3 milliLiter(s) IV Push every 8 hours  spironolactone 25 milliGRAM(s) Oral daily      Objective:  Vital Signs Last 24 Hrs  T(C): 36.8 (05 Jan 2022 14:32), Max: 36.8 (05 Jan 2022 14:32)  T(F): 98.2 (05 Jan 2022 14:32), Max: 98.2 (05 Jan 2022 14:32)  HR: 101 (05 Jan 2022 16:29) (97 - 133)  BP: 100/67 (05 Jan 2022 16:29) (86/61 - 100/67)  BP(mean): 78 (05 Jan 2022 16:29) (68 - 80)  RR: 18 (05 Jan 2022 16:29) (16 - 18)  SpO2: 98% (05 Jan 2022 16:29) (98% - 100%)    PHYSICAL EXAM:  Constitutional:no acute distress  Eyes:ALONSO, EOMI  Ear/Nose/Throat: no oral lesions, 	  Respiratory: clear BL  Cardiovascular: S1S2  Gastrointestinal:soft, (+) BS, no tenderness  Extremities:no e/e/c  No Lymphadenopathy  IV sites not inflammed.    LABS:                        10.9   7.27  )-----------( 239      ( 05 Jan 2022 09:49 )             33.5     01-05    133<L>  |  97  |  25<H>  ----------------------------<  103<H>  4.0   |  21<L>  |  0.78    Ca    9.7      05 Jan 2022 09:49  Phos  2.9     01-05  Mg     1.8     01-05    TPro  10.0<H>  /  Alb  4.0  /  TBili  0.3  /  DBili  x   /  AST  19  /  ALT  15  /  AlkPhos  142<H>  01-05          MICROBIOLOGY:            RECENT CULTURES:  12-30 @ 22:07  .Blood Blood  --  --  --    No Growth Final  --      RADIOLOGY & ADDITIONAL STUDIES:

## 2022-01-06 NOTE — PROGRESS NOTE ADULT - PROBLEM SELECTOR PLAN 2
- Dr. Prater, ID following  - COVID PCR negative as of 1/3  - s/p monoclonal antibodies, remdesivir, and dexamethasone  - hx of Serratia bacteremia- plan for levaquin for chronic suppression   - continue with Vanco PO for C. Diff ppx   - gallbladder US unremarkable

## 2022-01-06 NOTE — PROGRESS NOTE ADULT - PROBLEM SELECTOR PLAN 1
History of recurrent pneumonia and bacteremia; With stenotrophomonas in prior sputum cultures and enterobacter and serratia in blood in October 2021  -Blood cultures 12/13 + Serratia  -RUQ sonogram negative  -Cefepime 2g IV q8 (12/13-12/27)  -Bactrim started (12/27-12/30); not a candidate as pt not tolerated  -c/w Cefepime (12/30- currently)  -repeat bcx NGTD  -Spoke w/ Dr. Prater, ID service, today concerning DC plan:    =pt will continue on lifelong Levaquin 500mg QD for suppression therapy    =PO vanc 125mg BID for C. diff ppx    =no longer candidate for bactrim as not tolerated by pt

## 2022-01-06 NOTE — PROGRESS NOTE ADULT - PROBLEM SELECTOR PROBLEM 2
Krystal Parra is an 84 year old Female who is seen in follow up for Left CTR, DOS 10/20/2021, Dr. Conrad -  Carpal tunnel release.  Present symptoms: Pt reports Significant improvement to night time CT symptoms, but still has numbness and tingling at 1-3 digits.  Is keeping covered with surgical dressing. Pt is lightly using hand for activities. No new complaints.      Denies Chest pain, Calve pain, Fever, Chills.     Current Treatment: Postop.     PHYSICAL EXAM:  There were no vitals taken for this visit.  There is no weight on file to calculate BMI.   GENERAL APPEARANCE: healthy, alert and no distress   PSYCH: mentation appears normal and affect normal/bright     MSK:  Left:  Volar wrist.  Incision clean and dry, Sutures present, healing.  No incisional erythema.   No Ecchymosis.  Edema min at wrist hand and digits.  CMS: matias incisional numbness, otherwise grossly intact to digits.  AROM: mild restriction in palmar wrist flexion, otherwise WNL.        ASSESSMENT:  Krystal Parra is an 84 year old Female who is seen in follow up for Left CTR, DOS 10/20/2021, Dr. Conrad  Some symptom improvement. Healing incision.  We discussed that CT symptoms may improve for several months after surgery.        PLAN:  - Surgery discussed, images reviewed if applicable, and all questions were answered at this time.  - Care instructions given and verbally acknowledged.  - Medications: Taper RX pain meds, OTC PRN.  - Physical Therapy: As instructed / RICE and PROM.  - AAT     Return to clinic PRN.     Davis Salinas PA-C     Dept. Orthopedic Surgery  [facility]      11/1/2021   Sepsis due to COVID-19

## 2022-01-06 NOTE — PROGRESS NOTE ADULT - PROBLEM SELECTOR PLAN 4
-hydralazine discontinued  -spironolactone 25 mg QD  -c/w metoprolol succinate 50 mg QD  -replete Mg to 2-2.5, K 4-4.5  -Daily PT  -Encourage oral intake and nutrition, salt tabs

## 2022-01-06 NOTE — PROGRESS NOTE ADULT - ASSESSMENT
67 y/o M with a history of Stage D NICM s/p HM2 LVAD in 9/2017 at  (due to severe PAD) with TV ring, multiple GI bleeds, thus maintained off AC, CKD 3prior COVID-19 infection in 4/2020, recurrent syncope post LVAD s/p ILR, s/p prolonged complex hospitalization from 6/14-11/16/2021 initially admitted with abdominal pain complicated by GIB, respiratory failure s/p trach, multiple infections, s/p cholecystotomy tube and SMA stent 10/2021, ultimately discharged to [facility] rehab and then returned to [facility] for trach decannulation 12/2 who now presents with recurrent COVID-19 infection, initially in distributive shock. 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     He had elevated inflammatory markers which are now improving. He received monoclonal antibodies and was started on remdesivir and dexamethasone. He's afebrile with BCx from 12/18 and 12/30 NGTD. He had an episode of hypoxia noted 1/1, possibly in the setting of aspiration. He is currently saturating well on RA. He had runs of MMVT in setting of hypokalemia and being off his beta blocker which has since resolved. He currently appears well compensated from HF perspective. His LDH is stable. His LVAD is functioning well without s/s of pump malfunction.     He is medically ready for discharge and planned for discharge to [facility] in next 1-2 days.

## 2022-01-06 NOTE — PROGRESS NOTE ADULT - ATTENDING COMMENTS
await [facility] with LVAD  CHF, heme f/up  deconditioned chronically  no further inpatient needs  poor long term prognosis

## 2022-01-06 NOTE — PROGRESS NOTE ADULT - PROBLEM SELECTOR PLAN 2
COVID PCR + 12/13. Recent CXR clear, respiratory status stable on room air; s/p monoclonal antibodies Regeneron, s/p 5 day course of Remdesivir, s/p 10 day course of Dexamethasone  -repeat COVID PCR 12/26, 12/28 positive  -COVID negative 1/3/2022

## 2022-01-06 NOTE — PROGRESS NOTE ADULT - SUBJECTIVE AND OBJECTIVE BOX
PROGRESS NOTE:   Patient is a 66y old  Male who presents with a chief complaint of COVID (+) with AMS and hypotension (05 Jan 2022 19:37)      SUBJECTIVE / OVERNIGHT EVENTS:    ADDITIONAL REVIEW OF SYSTEMS:    MEDICATIONS  (STANDING):  aspirin  chewable 81 milliGRAM(s) Oral daily  baclofen 5 milliGRAM(s) Oral every 8 hours  chlorhexidine 2% Cloths 1 Application(s) Topical <User Schedule>  cholecalciferol 1000 Unit(s) Oral daily  dextrose 50% Injectable 50 milliLiter(s) IV Push every 15 minutes  dextrose 50% Injectable 25 milliLiter(s) IV Push every 15 minutes  enoxaparin Injectable 30 milliGRAM(s) SubCutaneous daily  insulin lispro (ADMELOG) corrective regimen sliding scale   SubCutaneous three times a day before meals  insulin lispro (ADMELOG) corrective regimen sliding scale   SubCutaneous at bedtime  levoFLOXacin  Tablet 500 milliGRAM(s) Oral every 24 hours  methimazole 10 milliGRAM(s) Oral daily  metoclopramide Injectable 10 milliGRAM(s) IV Push every 8 hours  metoprolol succinate ER 50 milliGRAM(s) Oral daily  mirtazapine 7.5 milliGRAM(s) Oral at bedtime  multivitamin 1 Tablet(s) Oral daily  pantoprazole    Tablet 40 milliGRAM(s) Oral every 12 hours  potassium phosphate / sodium phosphate Tablet (K-PHOS No. 2) 1 Tablet(s) Oral two times a day  sertraline 100 milliGRAM(s) Oral daily  sodium chloride 1 Gram(s) Oral every 12 hours  sodium chloride 0.9% lock flush 3 milliLiter(s) IV Push every 8 hours  spironolactone 25 milliGRAM(s) Oral daily  vancomycin    Solution 125 milliGRAM(s) Oral every 12 hours    MEDICATIONS  (PRN):  acetaminophen     Tablet .. 650 milliGRAM(s) Oral every 6 hours PRN Temp greater or equal to 38C (100.4F), Mild Pain (1 - 3), Moderate Pain (4 - 6)      CAPILLARY BLOOD GLUCOSE      POCT Blood Glucose.: 127 mg/dL (05 Jan 2022 21:41)  POCT Blood Glucose.: 125 mg/dL (05 Jan 2022 18:02)  POCT Blood Glucose.: 129 mg/dL (05 Jan 2022 12:38)  POCT Blood Glucose.: 93 mg/dL (05 Jan 2022 08:03)    I&O's Summary    05 Jan 2022 07:01  -  06 Jan 2022 07:00  --------------------------------------------------------  IN: 385 mL / OUT: 975 mL / NET: -590 mL        PHYSICAL EXAM:  Vital Signs Last 24 Hrs  T(C): 36.7 (06 Jan 2022 04:23), Max: 36.8 (05 Jan 2022 14:32)  T(F): 98 (06 Jan 2022 04:23), Max: 98.2 (05 Jan 2022 14:32)  HR: 98 (06 Jan 2022 04:23) (97 - 133)  BP: 94/62 (06 Jan 2022 04:23) (86/61 - 101/61)  BP(mean): 76 (05 Jan 2022 20:00) (68 - 78)  RR: 18 (06 Jan 2022 04:23) (16 - 18)  SpO2: 99% (06 Jan 2022 04:23) (98% - 100%)        LABS:                        10.9   7.27  )-----------( 239      ( 05 Jan 2022 09:49 )             33.5     01-05    133<L>  |  97  |  25<H>  ----------------------------<  103<H>  4.0   |  21<L>  |  0.78    Ca    9.7      05 Jan 2022 09:49  Phos  2.9     01-05  Mg     1.8     01-05    TPro  10.0<H>  /  Alb  4.0  /  TBili  0.3  /  DBili  x   /  AST  19  /  ALT  15  /  AlkPhos  142<H>  01-05 PROGRESS NOTE:   Patient is a 66y old  Male who presents with a chief complaint of COVID (+) with AMS and hypotension (05 Jan 2022 19:37)    SUBJECTIVE / OVERNIGHT EVENTS:  NAEO. Pt stable w/o any complaints. Denies f/c, sob, cp, palpitations, n/v, abd pain.       MEDICATIONS  (STANDING):  aspirin  chewable 81 milliGRAM(s) Oral daily  baclofen 5 milliGRAM(s) Oral every 8 hours  chlorhexidine 2% Cloths 1 Application(s) Topical <User Schedule>  cholecalciferol 1000 Unit(s) Oral daily  dextrose 50% Injectable 50 milliLiter(s) IV Push every 15 minutes  dextrose 50% Injectable 25 milliLiter(s) IV Push every 15 minutes  enoxaparin Injectable 30 milliGRAM(s) SubCutaneous daily  insulin lispro (ADMELOG) corrective regimen sliding scale   SubCutaneous three times a day before meals  insulin lispro (ADMELOG) corrective regimen sliding scale   SubCutaneous at bedtime  levoFLOXacin  Tablet 500 milliGRAM(s) Oral every 24 hours  methimazole 10 milliGRAM(s) Oral daily  metoclopramide Injectable 10 milliGRAM(s) IV Push every 8 hours  metoprolol succinate ER 50 milliGRAM(s) Oral daily  mirtazapine 7.5 milliGRAM(s) Oral at bedtime  multivitamin 1 Tablet(s) Oral daily  pantoprazole    Tablet 40 milliGRAM(s) Oral every 12 hours  potassium phosphate / sodium phosphate Tablet (K-PHOS No. 2) 1 Tablet(s) Oral two times a day  sertraline 100 milliGRAM(s) Oral daily  sodium chloride 1 Gram(s) Oral every 12 hours  sodium chloride 0.9% lock flush 3 milliLiter(s) IV Push every 8 hours  spironolactone 25 milliGRAM(s) Oral daily  vancomycin    Solution 125 milliGRAM(s) Oral every 12 hours    MEDICATIONS  (PRN):  acetaminophen     Tablet .. 650 milliGRAM(s) Oral every 6 hours PRN Temp greater or equal to 38C (100.4F), Mild Pain (1 - 3), Moderate Pain (4 - 6)      CAPILLARY BLOOD GLUCOSE  POCT Blood Glucose.: 127 mg/dL (05 Jan 2022 21:41)  POCT Blood Glucose.: 125 mg/dL (05 Jan 2022 18:02)  POCT Blood Glucose.: 129 mg/dL (05 Jan 2022 12:38)  POCT Blood Glucose.: 93 mg/dL (05 Jan 2022 08:03)      I&O's Summary    05 Jan 2022 07:01  -  06 Jan 2022 07:00  --------------------------------------------------------  IN: 385 mL / OUT: 975 mL / NET: -590 mL      PHYSICAL EXAM:  Vital Signs Last 24 Hrs  T(C): 36.7 (06 Jan 2022 04:23), Max: 36.8 (05 Jan 2022 14:32)  T(F): 98 (06 Jan 2022 04:23), Max: 98.2 (05 Jan 2022 14:32)  HR: 98 (06 Jan 2022 04:23) (97 - 133)  BP: 94/62 (06 Jan 2022 04:23) (86/61 - 101/61)  BP(mean): 76 (05 Jan 2022 20:00) (68 - 78)  RR: 18 (06 Jan 2022 04:23) (16 - 18)  SpO2: 99% (06 Jan 2022 04:23) (98% - 100%)    CONSTITUTIONAL: NAD, well-developed  HEENT: NC/AT, EOMI, tracheostomy present with overlying dressing, c/d/i  RESPIRATORY: No increased work of breathing, CTAB, no wheezes or crackles appreciated  CARDIOVASCULAR: rhythm inaudible, LVAD hum present  ABDOMEN: soft, NT, ND, bowel sounds present  EXTREMITIES: No LE edema  MUSCULOSKELETAL: no joint swelling or tenderness to palpation  NEURO: A&Ox3, moving all extremities      LABS:                        10.9   7.27  )-----------( 239      ( 05 Jan 2022 09:49 )             33.5     01-05    133<L>  |  97  |  25<H>  ----------------------------<  103<H>  4.0   |  21<L>  |  0.78    Ca    9.7      05 Jan 2022 09:49  Phos  2.9     01-05  Mg     1.8     01-05    TPro  10.0<H>  /  Alb  4.0  /  TBili  0.3  /  DBili  x   /  AST  19  /  ALT  15  /  AlkPhos  142<H>  01-05

## 2022-01-06 NOTE — PROGRESS NOTE ADULT - ASSESSMENT
66 yo man with a history of VAD placement history of COVID infection in April 2020, history of a prolonged hospitalization in 2021 with recurrent sepsis, pneumonia, intubated/trach x2 cycles , chronic gall bladder disease s/p perc dawn, SMA ischemia requiring a stent placement, cachexia who was discharged to rehab but is presented from [facility] on 12/13 with AMS, hypotensive, tachycardic found to be COVID (+). Pt lethargic and mumbling words required ICU admission and pressor support now titrated off and downgraded to the floor. SW able to find [facility] that would accept LVAD.

## 2022-01-06 NOTE — PROGRESS NOTE ADULT - SUBJECTIVE AND OBJECTIVE BOX
Subjective:  No overnight events    Medications:  acetaminophen     Tablet .. 650 milliGRAM(s) Oral every 6 hours PRN  aspirin  chewable 81 milliGRAM(s) Oral daily  baclofen 5 milliGRAM(s) Oral every 8 hours  chlorhexidine 2% Cloths 1 Application(s) Topical <User Schedule>  cholecalciferol 1000 Unit(s) Oral daily  dextrose 50% Injectable 50 milliLiter(s) IV Push every 15 minutes  dextrose 50% Injectable 25 milliLiter(s) IV Push every 15 minutes  enoxaparin Injectable 30 milliGRAM(s) SubCutaneous daily  levoFLOXacin  Tablet 500 milliGRAM(s) Oral every 24 hours  methimazole 10 milliGRAM(s) Oral daily  metoclopramide 10 milliGRAM(s) Oral every 8 hours  metoprolol succinate ER 50 milliGRAM(s) Oral daily  mirtazapine 7.5 milliGRAM(s) Oral at bedtime  multivitamin 1 Tablet(s) Oral daily  pantoprazole    Tablet 40 milliGRAM(s) Oral every 12 hours  potassium phosphate / sodium phosphate Tablet (K-PHOS No. 2) 1 Tablet(s) Oral two times a day  sertraline 100 milliGRAM(s) Oral daily  sodium chloride 1 Gram(s) Oral every 12 hours  sodium chloride 0.9% lock flush 3 milliLiter(s) IV Push every 8 hours  spironolactone 25 milliGRAM(s) Oral daily  vancomycin    Solution 125 milliGRAM(s) Oral every 12 hours    Vitals:  T(C): 36.6 (22 @ 16:00), Max: 36.7 (22 @ 04:23)  HR: 101 (22 @ 16:00) (89 - 101)  BP: 98/61 (22 @ 07:58) (94/62 - 101/61)  BP(mean): 74 (22 @ 16:00) (72 - 76)  RR: 16 (22 @ 16:00) (16 - 18)  SpO2: 98% (22 @ 16:00) (98% - 99%)    Daily     Daily Weight in k.8 (2022 08:56)        I&O's Summary    2022 07:01  -  2022 07:00  --------------------------------------------------------  IN: 385 mL / OUT: 975 mL / NET: -590 mL        Physical Exam:  Appearance: Frail, no distress   HEENT: JVP non elevated  Cardiovascular: VAD hums   Respiratory: Clear to auscultation bilaterally  Gastrointestinal: Soft, per dawn tube with green fluid in bag   Skin: no skin lesions  Neurologic: Non-focal  Extremities: No LE edema, warm and well perfused  Psychiatry: depressed affect     LVAD interrogation   Flow: 5.1  Speed: 9200  PI: 3.8  Power: 5.3   Alarms: none  Changes to device programming: none     Labs:                        10.9   7.27  )-----------( 239      ( 2022 09:49 )             33.5     01-05    133<L>  |  97  |  25<H>  ----------------------------<  103<H>  4.0   |  21<L>  |  0.78    Ca    9.7      2022 09:49  Phos  2.9     -05  Mg     1.8     -05    TPro  10.0<H>  /  Alb  4.0  /  TBili  0.3  /  DBili  x   /  AST  19  /  ALT  15  /  AlkPhos  142<H>  01-05                Lactate Dehydrogenase, Serum: 254 U/L ( @ 09:49)

## 2022-01-07 NOTE — PROGRESS NOTE ADULT - SUBJECTIVE AND OBJECTIVE BOX
INFECTIOUS DISEASES FOLLOW UP-- Anitra Prater  [phone]    This is a follow up note for this  66yMale with  Sepsis        ROS:  CONSTITUTIONAL:  No fever, good appetite  CARDIOVASCULAR:  No chest pain or palpitations  RESPIRATORY:  No dyspnea  GASTROINTESTINAL:  No nausea, vomiting, diarrhea, or abdominal pain  GENITOURINARY:  No dysuria  NEUROLOGIC:  No headache,     Allergies    Amiodarone Hydrochloride (Other (Severe))    Intolerances        ANTIBIOTICS/RELEVANT:  antimicrobials  levoFLOXacin  Tablet 500 milliGRAM(s) Oral every 24 hours  vancomycin    Solution 125 milliGRAM(s) Oral every 12 hours    immunologic:    OTHER:  acetaminophen     Tablet .. 650 milliGRAM(s) Oral every 6 hours PRN  aspirin  chewable 81 milliGRAM(s) Oral daily  baclofen 5 milliGRAM(s) Oral every 8 hours  chlorhexidine 2% Cloths 1 Application(s) Topical <User Schedule>  cholecalciferol 1000 Unit(s) Oral daily  dextrose 50% Injectable 50 milliLiter(s) IV Push every 15 minutes  dextrose 50% Injectable 25 milliLiter(s) IV Push every 15 minutes  enoxaparin Injectable 30 milliGRAM(s) SubCutaneous daily  magnesium oxide 400 milliGRAM(s) Oral three times a day with meals  magnesium sulfate  IVPB 2 Gram(s) IV Intermittent once  methimazole 10 milliGRAM(s) Oral daily  metoclopramide 10 milliGRAM(s) Oral every 8 hours  metoprolol succinate ER 50 milliGRAM(s) Oral daily  mirtazapine 7.5 milliGRAM(s) Oral at bedtime  multivitamin 1 Tablet(s) Oral daily  pantoprazole    Tablet 40 milliGRAM(s) Oral every 12 hours  potassium phosphate / sodium phosphate Tablet (K-PHOS No. 2) 1 Tablet(s) Oral two times a day  sertraline 100 milliGRAM(s) Oral daily  sodium chloride 1 Gram(s) Oral every 12 hours  sodium chloride 0.9% lock flush 3 milliLiter(s) IV Push every 8 hours  spironolactone 25 milliGRAM(s) Oral daily      Objective:  Vital Signs Last 24 Hrs  T(C): 36.6 (07 Jan 2022 04:00), Max: 36.7 (06 Jan 2022 21:00)  T(F): 97.8 (07 Jan 2022 04:00), Max: 98 (06 Jan 2022 21:00)  HR: 66 (07 Jan 2022 13:15) (66 - 99)  BP: 91/66 (07 Jan 2022 13:15) (85/67 - 91/66)  BP(mean): 74 (07 Jan 2022 13:15) (70 - 74)  RR: 16 (07 Jan 2022 09:00) (16 - 18)  SpO2: 99% (07 Jan 2022 09:00) (99% - 99%)    PHYSICAL EXAM:  Constitutional:no acute distress  Eyes:ALONSO, EOMI  Ear/Nose/Throat: no oral lesions, 	  Respiratory: clear BL  Cardiovascular: S1S2  Gastrointestinal:soft, (+) BS, no tenderness  Extremities:no e/e/c  No Lymphadenopathy  IV sites not inflammed.    LABS:                        9.4    6.18  )-----------( 195      ( 07 Jan 2022 06:07 )             28.8     01-07    130<L>  |  99  |  19  ----------------------------<  79  4.3   |  20<L>  |  0.74    Ca    9.0      07 Jan 2022 06:04  Phos  2.9     01-07  Mg     1.5     01-07    TPro  8.5<H>  /  Alb  3.3  /  TBili  0.3  /  DBili  x   /  AST  18  /  ALT  12  /  AlkPhos  129<H>  01-07          MICROBIOLOGY:            RECENT CULTURES:      RADIOLOGY & ADDITIONAL STUDIES: INFECTIOUS DISEASES FOLLOW UP-- Anitra Prater  [phone]    This is a follow up note for this  66yMale with  Sepsis        ROS:  CONSTITUTIONAL:  No fever, good appetite  CARDIOVASCULAR:  No chest pain or palpitations  RESPIRATORY:  No dyspnea  GASTROINTESTINAL:  No nausea, vomiting, diarrhea, or abdominal pain  GENITOURINARY:  No dysuria  NEUROLOGIC:  No headache,     Allergies    Amiodarone Hydrochloride (Other (Severe))    Intolerances        ANTIBIOTICS/RELEVANT:  antimicrobials  levoFLOXacin  Tablet 500 milliGRAM(s) Oral every 24 hours  vancomycin    Solution 125 milliGRAM(s) Oral every 12 hours    immunologic:    OTHER:  acetaminophen     Tablet .. 650 milliGRAM(s) Oral every 6 hours PRN  aspirin  chewable 81 milliGRAM(s) Oral daily  baclofen 5 milliGRAM(s) Oral every 8 hours  chlorhexidine 2% Cloths 1 Application(s) Topical <User Schedule>  cholecalciferol 1000 Unit(s) Oral daily  dextrose 50% Injectable 50 milliLiter(s) IV Push every 15 minutes  dextrose 50% Injectable 25 milliLiter(s) IV Push every 15 minutes  enoxaparin Injectable 30 milliGRAM(s) SubCutaneous daily  magnesium oxide 400 milliGRAM(s) Oral three times a day with meals  magnesium sulfate  IVPB 2 Gram(s) IV Intermittent once  methimazole 10 milliGRAM(s) Oral daily  metoclopramide 10 milliGRAM(s) Oral every 8 hours  metoprolol succinate ER 50 milliGRAM(s) Oral daily  mirtazapine 7.5 milliGRAM(s) Oral at bedtime  multivitamin 1 Tablet(s) Oral daily  pantoprazole    Tablet 40 milliGRAM(s) Oral every 12 hours  potassium phosphate / sodium phosphate Tablet (K-PHOS No. 2) 1 Tablet(s) Oral two times a day  sertraline 100 milliGRAM(s) Oral daily  sodium chloride 1 Gram(s) Oral every 12 hours  sodium chloride 0.9% lock flush 3 milliLiter(s) IV Push every 8 hours  spironolactone 25 milliGRAM(s) Oral daily      Objective:  Vital Signs Last 24 Hrs  T(C): 36.6 (07 Jan 2022 04:00), Max: 36.7 (06 Jan 2022 21:00)  T(F): 97.8 (07 Jan 2022 04:00), Max: 98 (06 Jan 2022 21:00)  HR: 66 (07 Jan 2022 13:15) (66 - 99)  BP: 91/66 (07 Jan 2022 13:15) (85/67 - 91/66)  BP(mean): 74 (07 Jan 2022 13:15) (70 - 74)  RR: 16 (07 Jan 2022 09:00) (16 - 18)  SpO2: 99% (07 Jan 2022 09:00) (99% - 99%)    PHYSICAL EXAM:  Constitutional:no acute distress  Eyes:ALONSO, EOMI  Ear/Nose/Throat: no oral lesions, 	  Respiratory: clear BL  Cardiovascular: [id number] sounds  Gastrointestinal:soft, (+) BS, no tenderness  cholecystotomy drain with bilious material  exit site dressing CDI  Extremities:no e/e/c  No Lymphadenopathy  IV sites not inflammed.    LABS:                        9.4    6.18  )-----------( 195      ( 07 Jan 2022 06:07 )             28.8     01-07    130<L>  |  99  |  19  ----------------------------<  79  4.3   |  20<L>  |  0.74    Ca    9.0      07 Jan 2022 06:04  Phos  2.9     01-07  Mg     1.5     01-07    TPro  8.5<H>  /  Alb  3.3  /  TBili  0.3  /  DBili  x   /  AST  18  /  ALT  12  /  AlkPhos  129<H>  01-07          MICROBIOLOGY:            RECENT CULTURES:      RADIOLOGY & ADDITIONAL STUDIES:    < from: Xray Cinesophagram Swallow Function w/ Contrast (01.03.22 @ 12:12) >  Degenerative disease of the cervical spine.    There is premature spill of contrast into the vallecula and piriform   sinus.    There is no evidence of penetration or aspiration with puree, solids and   thin fluids.    For further information and recommendations, please refer to the speech   pathologist final report which is available for review in the electronic   medical record.    < end of copied text >

## 2022-01-07 NOTE — PROGRESS NOTE ADULT - ACP SCRIBE NAME
Physical Therapy  DATE: 2022    NAME: Nuris Wilson  MRN: [id number]   : 1973    Patient not seen this date for Physical Therapy due to:      [] Cancel by RN or physician due to:    [x] Hemodialysis Please try to check back later in the afternoon if possible per RN Vernia Krabbe as patient is off HI Flow and they want to know how he moves and gets around. [] Critical Lab Value Level     [] Blood transfusion in progress    [] Acute or unstable cardiovascular status   _MAP < 55 or more than >115  _HR < 40 or > 130    [] Acute or unstable pulmonary status   -FiO2 > 60%   _RR < 5 or >40    _O2 sats < 85%    [] Strict Bedrest    [] Off Unit for surgery or procedure    [] Off Unit for testing       [] Pending imaging to R/O fracture    [] Refusal by Patient      [] Other      [] PT being discontinued at this time. Patient independent. No further needs. [] PT being discontinued at this time as the patient has been transferred to hospice care. No further needs.       Jaqueline Vance, PTA [facility] [employer]

## 2022-01-07 NOTE — PROGRESS NOTE ADULT - ASSESSMENT
64 yo man with a history of VAD placement history of COVID infection in April 2020, history of a prolonged hospitalization in 2021 with recurrent sepsis, pneumonia, intubated/trach x2 cycles , chronic gall bladder disease s/p perc dawn, SMA ischemia requiring a stent placement, cachexia who was discharged to rehab but is presented from [facility] on 12/13 with AMS, hypotensive, tachycardic found to be COVID (+). Pt lethargic and mumbling words required ICU admission and pressor support now titrated off and downgraded to the floor. SW able to find [facility] that would accept LVAD.  66 yo man with a history of VAD placement history of COVID infection in April 2020, history of a prolonged hospitalization in 2021 with recurrent sepsis, pneumonia, intubated/trach x2 cycles , chronic gall bladder disease s/p perc dawn, SMA ischemia requiring a stent placement, cachexia who was discharged to rehab but is presented from [facility] on 12/13 with AMS, hypotensive, tachycardic found to be COVID (+). Pt lethargic and mumbling words required ICU admission and pressor support now titrated off and downgraded to the floor.

## 2022-01-07 NOTE — PROGRESS NOTE ADULT - ASSESSMENT
66yo man with a history of VAD palcement, history of COVID infeciton in April 2020, history of a prolonged hospitalization in 2021 with recurrent sepsis, pneumonia, intubated/trach x2 cycles , chronic gall bladder disease s/p perc dawn, SMA ischemia reuiring a stent placement, cachecxia who was discharged to rehab but is presented from [facility] on 12/13 with AMS, hypotensive, tachycardic found to be COVID (+). Pt is lethargic and mumbling words required ICU admission and pressor support.    More awake, alert, interactive   oxygen nasal requirements with reasonable saturation    COVID infection-   confirmed second bout of COVID by documented PCR testing  Ideally, would be interested in sequencing the virus to determine strain (omicron vs delta)  Received a dose of monoclonal antibodies [employer]ener[employer] product   Completed remdesivir therapy  Completed ten days of dexamethasone  Refuses most vaccines- but will need to wait three months  to receive COVID vaccine series post monoclonal  Will administer flu/pneumonia vaccines this admission prior to discharge if he will permit        Serratia Bacteremia:  History of recurrent pneumonia and bacteremias   With stenotrophomonas in prior sputum cultures and enterobacter and serratia in blood in October 2021  Blood cultures sent 12/13 growing serratia-    Cefepime to 2 gram IV q 8hr as strain is sensitive to Cefepime- completed treatment course   suppression with oral bactrim started and patient developed a fever ? bactrim related   repeat blood cultures for evidence of clearing demonstrated clearance of infection   RUQ sonogram done to reassess- gall bladder does not show acute cholecystitis  Hiccups- resolved    Source of serratia not entirely clear- differential GI translocation, biliary tree, lungs, other including LVAD      Prior severe C.diff infection   pre-emptive oral Vanco 125mg bid    Currently receiving oral Levaquin for suppressive therapy, limited oral options based upon recent sensitivity pattern of the serratia and hx of 'fever' with Bactrim. Would consider a rechallenge with Bactrim in the future    Morris Prater MD  [phone]  After 5pm/weekends [phone]   64yo man with a history of VAD palcement, history of COVID infeciton in April 2020, history of a prolonged hospitalization in 2021 with recurrent sepsis, pneumonia, intubated/trach x2 cycles , chronic gall bladder disease s/p perc dawn, SMA ischemia reuiring a stent placement, cachecxia who was discharged to rehab but is presented from [facility] on 12/13 with AMS, hypotensive, tachycardic found to be COVID (+). Pt is lethargic and mumbling words required ICU admission and pressor support.    More awake, alert, interactive   oxygen nasal requirements with reasonable saturation    COVID infection-   confirmed second bout of COVID by documented PCR testing  Ideally, would be interested in sequencing the virus to determine strain (omicron vs delta)  Received a dose of monoclonal antibodies Regeneron product   Completed remdesivir therapy  Completed ten days of dexamethasone  Refuses most vaccines- but will need to wait three months  to receive COVID vaccine series post monoclonal  Will administer flu/pneumonia vaccines this admission prior to discharge if he will permit        Serratia Bacteremia:  History of recurrent pneumonia and bacteremias   With stenotrophomonas in prior sputum cultures and enterobacter and serratia in blood in October 2021  Blood cultures sent 12/13 growing serratia-    Cefepime to 2 gram IV q 8hr as strain is sensitive to Cefepime- completed treatment course   suppression with oral bactrim started and patient developed a fever ? bactrim related   currently receiving oral levaquin suppression    Source of serratia not entirely clear- differential GI translocation, biliary tree, lungs, other including LVAD      Prior severe C.diff infection   pre-emptive oral Vanco 125mg bid      Morris Prater MD  [phone]  After 5pm/weekends [phone]

## 2022-01-08 NOTE — PROGRESS NOTE ADULT - ATTENDING COMMENTS
1. Covid Pna  2. Serratia Bacteremia, unclear source  3. C. Diff colitis  4. Chronic systolic HF, s/p LVAD    - Afebrile, O2 sat 99% in RA, WBC 5.4. Improved overall    ID f/u plan noted- recurrent pneumonia and bacteremias with Stenotrophomonas in prior sputum cultures and enterobacter and serratia in blood in   October 202. Also blood cultures sent 12/13 growing serratia    s/p IV Cefepime to 2 gram IV q 8hr, now on PO Levaquin 500mg life long (couldn't tolerate Bactrim)    c/q PO Vancomycin 125m bid for h/o C. diff  - Received a dose of monoclonal antibodies Regeneron product and Completed Remdesivir, 10 days of dexamethasone  - Cardiology f/u plan noted- continue with Toprol XL to 50 mg daily, spironolactone 25 mg daily. Also on Methimazole for Thyrotoxicosis   - Daily PT 1. Covid Pna  2. Serratia Bacteremia, unclear source  3. C. Diff colitis  4. Chronic systolic HF, s/p LVAD    - Afebrile, O2 sat 99% in RA, WBC 5.4. Improved overall    ID f/u plan noted- recurrent pneumonia and bacteremias with Stenotrophomonas in prior sputum cultures and enterobacter and serratia in blood in   October 202. Also blood cultures sent 12/13 growing serratia    s/p IV Cefepime to 2 gram IV q 8hr, now on PO Levaquin 500mg life long (couldn't tolerate Bactrim)    c/w PO Vancomycin 125m bid for h/o C. diff  - Received a dose of monoclonal antibodies Regeneron product and Completed Remdesivir, 10 days of dexamethasone  - Cardiology f/u plan noted- continue with Toprol XL to 50 mg daily, spironolactone 25 mg daily. Also on Methimazole for Thyrotoxicosis   - Daily PT

## 2022-01-08 NOTE — PROGRESS NOTE ADULT - PROBLEM SELECTOR PLAN 1
History of recurrent pneumonia and bacteremia; With stenotrophomonas in prior sputum cultures and enterobacter and serratia in blood in October 2021  -Blood cultures 12/13 + Serratia  -RUQ sonogram negative  -Cefepime 2g IV q8 (12/13-12/27)  -Bactrim started (12/27-12/30); not a candidate as pt not tolerated  -c/w Cefepime (12/30- currently)  -repeat bcx NGTD  -Spoke w/ Dr. Prater, ID service, today concerning DC plan:    =pt will continue on lifelong Levaquin 500mg QD for suppression therapy    =PO vanc 125mg BID for C. diff ppx    =no longer candidate for bactrim as not tolerated by pt History of recurrent pneumonia and bacteremia; With stenotrophomonas in prior sputum cultures and enterobacter and serratia in blood in October 2021  -Blood cultures 12/13 + Serratia  -c/w Cefepime (12/30- currently)  -repeat bcx NGTD  -Spoke w/ Dr. Prater, ID service, today concerning DC plan:    =pt will continue on lifelong Levaquin 500mg QD for suppression therapy    =PO vanc 125mg BID for C. diff ppx    =no longer candidate for bactrim as not tolerated by pt

## 2022-01-08 NOTE — PROGRESS NOTE ADULT - PROBLEM SELECTOR PLAN 9
-Diet: Regular  -DVT: lovenox 30mg QD  -Dispo: [facility] (found facility accepting LVAD; however due to COVID outbreak no longer accepting); now home w/ home PT

## 2022-01-08 NOTE — PROGRESS NOTE ADULT - PROBLEM SELECTOR PLAN 4
-hydralazine discontinued  -spironolactone 25 mg QD  -c/w metoprolol succinate 50 mg QD  -replete Mg to 2-2.5, K 4-4.5  -Daily PT  -Encourage oral intake and nutrition, salt tabs -hydralazine discontinued  -c/w spironolactone 25 mg QD  -c/w metoprolol succinate 50 mg QD  -replete Mg to 2-2.5, K 4-4.5  -Daily PT  -Encourage oral intake and nutrition, salt tabs

## 2022-01-08 NOTE — PROGRESS NOTE ADULT - SUBJECTIVE AND OBJECTIVE BOX
PROGRESS NOTE:   Patient is a 66y old  Male who presents with a chief complaint of COVID (+) with AMS and hypotension (07 Jan 2022 16:07)      SUBJECTIVE / OVERNIGHT EVENTS:    ADDITIONAL REVIEW OF SYSTEMS:    MEDICATIONS  (STANDING):  aspirin  chewable 81 milliGRAM(s) Oral daily  baclofen 5 milliGRAM(s) Oral every 8 hours  chlorhexidine 2% Cloths 1 Application(s) Topical <User Schedule>  cholecalciferol 1000 Unit(s) Oral daily  dextrose 50% Injectable 50 milliLiter(s) IV Push every 15 minutes  dextrose 50% Injectable 25 milliLiter(s) IV Push every 15 minutes  enoxaparin Injectable 30 milliGRAM(s) SubCutaneous daily  levoFLOXacin  Tablet 500 milliGRAM(s) Oral every 24 hours  magnesium oxide 400 milliGRAM(s) Oral three times a day with meals  magnesium sulfate  IVPB 2 Gram(s) IV Intermittent once  methimazole 10 milliGRAM(s) Oral daily  metoclopramide 10 milliGRAM(s) Oral every 8 hours  metoprolol succinate ER 50 milliGRAM(s) Oral daily  mirtazapine 7.5 milliGRAM(s) Oral at bedtime  multivitamin 1 Tablet(s) Oral daily  pantoprazole    Tablet 40 milliGRAM(s) Oral every 12 hours  potassium phosphate / sodium phosphate Tablet (K-PHOS No. 2) 1 Tablet(s) Oral two times a day  sertraline 100 milliGRAM(s) Oral daily  sodium chloride 1 Gram(s) Oral every 12 hours  sodium chloride 0.9% lock flush 3 milliLiter(s) IV Push every 8 hours  spironolactone 25 milliGRAM(s) Oral daily  vancomycin    Solution 125 milliGRAM(s) Oral every 12 hours    MEDICATIONS  (PRN):  acetaminophen     Tablet .. 650 milliGRAM(s) Oral every 6 hours PRN Temp greater or equal to 38C (100.4F), Mild Pain (1 - 3), Moderate Pain (4 - 6)      CAPILLARY BLOOD GLUCOSE      POCT Blood Glucose.: 104 mg/dL (07 Jan 2022 11:47)  POCT Blood Glucose.: 80 mg/dL (07 Jan 2022 08:24)    I&O's Summary    07 Jan 2022 07:01  -  08 Jan 2022 07:00  --------------------------------------------------------  IN: 720 mL / OUT: 650 mL / NET: 70 mL        PHYSICAL EXAM:  Vital Signs Last 24 Hrs  T(C): 36.1 (08 Jan 2022 04:38), Max: 36.7 (08 Jan 2022 00:30)  T(F): 97 (08 Jan 2022 04:38), Max: 98.1 (08 Jan 2022 00:30)  HR: 100 (08 Jan 2022 04:38) (66 - 102)  BP: 91/62 (08 Jan 2022 04:38) (85/67 - 97/66)  BP(mean): 72 (08 Jan 2022 04:38) (70 - 77)  RR: 18 (08 Jan 2022 04:38) (16 - 18)  SpO2: 98% (08 Jan 2022 07:32) (98% - 99%)        LABS:                        9.5    5.47  )-----------( 199      ( 08 Jan 2022 06:48 )             28.8     01-07    130<L>  |  99  |  19  ----------------------------<  79  4.3   |  20<L>  |  0.74    Ca    9.0      07 Jan 2022 06:04  Phos  2.9     01-07  Mg     1.5     01-07    TPro  8.5<H>  /  Alb  3.3  /  TBili  0.3  /  DBili  x   /  AST  18  /  ALT  12  /  AlkPhos  129<H>  01-07                RADIOLOGY & ADDITIONAL TESTS:  Results Reviewed:   Imaging Personally Reviewed:  Electrocardiogram Personally Reviewed:    COORDINATION OF CARE:  Care Discussed with Consultants/Other Providers [Y/N]:  Prior or Outpatient Records Reviewed [Y/N]:   PROGRESS NOTE:   Patient is a 66y old  Male who presents with a chief complaint of COVID (+) with AMS and hypotension (07 Jan 2022 16:07)      SUBJECTIVE / OVERNIGHT EVENTS:  NAEO. Pt stable w/ no complaints. Denies f/c, sob, cough, cp, palpitations, n/v, diarrhea/constipation, leg swelling.       MEDICATIONS  (STANDING):  aspirin  chewable 81 milliGRAM(s) Oral daily  baclofen 5 milliGRAM(s) Oral every 8 hours  chlorhexidine 2% Cloths 1 Application(s) Topical <User Schedule>  cholecalciferol 1000 Unit(s) Oral daily  dextrose 50% Injectable 50 milliLiter(s) IV Push every 15 minutes  dextrose 50% Injectable 25 milliLiter(s) IV Push every 15 minutes  enoxaparin Injectable 30 milliGRAM(s) SubCutaneous daily  levoFLOXacin  Tablet 500 milliGRAM(s) Oral every 24 hours  magnesium oxide 400 milliGRAM(s) Oral three times a day with meals  magnesium sulfate  IVPB 2 Gram(s) IV Intermittent once  methimazole 10 milliGRAM(s) Oral daily  metoclopramide 10 milliGRAM(s) Oral every 8 hours  metoprolol succinate ER 50 milliGRAM(s) Oral daily  mirtazapine 7.5 milliGRAM(s) Oral at bedtime  multivitamin 1 Tablet(s) Oral daily  pantoprazole    Tablet 40 milliGRAM(s) Oral every 12 hours  potassium phosphate / sodium phosphate Tablet (K-PHOS No. 2) 1 Tablet(s) Oral two times a day  sertraline 100 milliGRAM(s) Oral daily  sodium chloride 1 Gram(s) Oral every 12 hours  sodium chloride 0.9% lock flush 3 milliLiter(s) IV Push every 8 hours  spironolactone 25 milliGRAM(s) Oral daily  vancomycin    Solution 125 milliGRAM(s) Oral every 12 hours    MEDICATIONS  (PRN):  acetaminophen     Tablet .. 650 milliGRAM(s) Oral every 6 hours PRN Temp greater or equal to 38C (100.4F), Mild Pain (1 - 3), Moderate Pain (4 - 6)      CAPILLARY BLOOD GLUCOSE  POCT Blood Glucose.: 104 mg/dL (07 Jan 2022 11:47)  POCT Blood Glucose.: 80 mg/dL (07 Jan 2022 08:24)      I&O's Summary    07 Jan 2022 07:01  -  08 Jan 2022 07:00  --------------------------------------------------------  IN: 720 mL / OUT: 650 mL / NET: 70 mL      PHYSICAL EXAM:  Vital Signs Last 24 Hrs  T(C): 36.1 (08 Jan 2022 04:38), Max: 36.7 (08 Jan 2022 00:30)  T(F): 97 (08 Jan 2022 04:38), Max: 98.1 (08 Jan 2022 00:30)  HR: 100 (08 Jan 2022 04:38) (66 - 102)  BP: 91/62 (08 Jan 2022 04:38) (85/67 - 97/66)  BP(mean): 72 (08 Jan 2022 04:38) (70 - 77)  RR: 18 (08 Jan 2022 04:38) (16 - 18)  SpO2: 98% (08 Jan 2022 07:32) (98% - 99%)    CONSTITUTIONAL: NAD, well-developed  HEENT: NC/AT, EOMI, tracheostomy present with overlying dressing, c/d/i  RESPIRATORY: No increased work of breathing, CTAB, no wheezes or crackles appreciated  CARDIOVASCULAR: rhythm inaudible, LVAD hum present  ABDOMEN: soft, NT, ND, bowel sounds present  EXTREMITIES: No LE edema  MUSCULOSKELETAL: no joint swelling or tenderness to palpation  NEURO: A&Ox3, moving all extremities      LABS:                        9.5    5.47  )-----------( 199      ( 08 Jan 2022 06:48 )             28.8     01-07    130<L>  |  99  |  19  ----------------------------<  79  4.3   |  20<L>  |  0.74    Ca    9.0      07 Jan 2022 06:04  Phos  2.9     01-07  Mg     1.5     01-07    TPro  8.5<H>  /  Alb  3.3  /  TBili  0.3  /  DBili  x   /  AST  18  /  ALT  12  /  AlkPhos  129<H>  01-07

## 2022-01-08 NOTE — PROGRESS NOTE ADULT - ASSESSMENT
66 yo man with a history of VAD placement history of COVID infection in April 2020, history of a prolonged hospitalization in 2021 with recurrent sepsis, pneumonia, intubated/trach x2 cycles , chronic gall bladder disease s/p perc dawn, SMA ischemia requiring a stent placement, cachexia who was discharged to rehab but is presented from [facility] on 12/13 with AMS, hypotensive, tachycardic found to be COVID (+). Pt lethargic and mumbling words required ICU admission and pressor support now titrated off and downgraded to the floor. 65M PMHx VAD placement history of COVID infection in April 2020, history of a prolonged hospitalization in 2021 with recurrent sepsis, pneumonia, intubated/trach x2 cycles , chronic gall bladder disease s/p perc dawn, SMA ischemia requiring a stent placement, cachexia who was discharged to rehab but is presented from [facility] on 12/13 with AMS, hypotensive, tachycardic found to be COVID (+). Pt lethargic and mumbling words required ICU admission and pressor support now titrated off and downgraded to the floor. Awaiting dispo. Likely going home after optimization w/ PT.

## 2022-01-09 NOTE — PROGRESS NOTE ADULT - ATTENDING COMMENTS
1. Covid Pna  2. Serratia Bacteremia, unclear source  3. C. Diff colitis  4. Chronic systolic HF, s/p LVAD    - Afebrile, O2 sat 99% in RA, WBC 6.2. Improved overall    ID f/u plan noted- recurrent pneumonia and bacteremias with Stenotrophomonas in prior sputum cultures and enterobacter and serratia in blood in   October 202. Also blood cultures sent 12/13 growing Serratia    s/p IV Cefepime to 2 gram IV q 8hr, now on PO Levaquin 500mg life long (couldn't tolerate Bactrim)    c/w PO Vancomycin 125m bid for h/o C. diff  - Received a dose of monoclonal antibodies Regeneron product and Completed Remdesivir, 10 days of dexamethasone  - Cardiology f/u plan noted- continue with Toprol XL to 50 mg daily, spironolactone 25 mg daily. Also on Methimazole for Thyrotoxicosis   - Daily PT

## 2022-01-09 NOTE — PROGRESS NOTE ADULT - SUBJECTIVE AND OBJECTIVE BOX
*************************************  Shirin Vallecillo M.D. | PGY-2  Department of Internal Medicine  [phone] ([facility]) | [id number] ([facility])  *************************************      Patient is a 66y old  Male who presents with a chief complaint of COVID (+) with AMS and hypotension (08 Jan 2022 07:34)      SUBJECTIVE / OVERNIGHT EVENTS:    12 point ROS negative except as noted above.     MEDICATIONS  (STANDING):  aspirin  chewable 81 milliGRAM(s) Oral daily  baclofen 5 milliGRAM(s) Oral every 8 hours  chlorhexidine 2% Cloths 1 Application(s) Topical <User Schedule>  cholecalciferol 1000 Unit(s) Oral daily  dextrose 50% Injectable 50 milliLiter(s) IV Push every 15 minutes  dextrose 50% Injectable 25 milliLiter(s) IV Push every 15 minutes  enoxaparin Injectable 30 milliGRAM(s) SubCutaneous daily  levoFLOXacin  Tablet 500 milliGRAM(s) Oral every 24 hours  magnesium oxide 400 milliGRAM(s) Oral three times a day with meals  magnesium sulfate  IVPB 2 Gram(s) IV Intermittent once  methimazole 10 milliGRAM(s) Oral daily  metoclopramide 10 milliGRAM(s) Oral every 8 hours  metoprolol succinate ER 50 milliGRAM(s) Oral daily  mirtazapine 7.5 milliGRAM(s) Oral at bedtime  multivitamin 1 Tablet(s) Oral daily  pantoprazole    Tablet 40 milliGRAM(s) Oral every 12 hours  potassium phosphate / sodium phosphate Tablet (K-PHOS No. 2) 1 Tablet(s) Oral two times a day  sertraline 100 milliGRAM(s) Oral daily  sodium chloride 1 Gram(s) Oral every 12 hours  sodium chloride 0.9% lock flush 3 milliLiter(s) IV Push every 8 hours  spironolactone 25 milliGRAM(s) Oral daily  vancomycin    Solution 125 milliGRAM(s) Oral every 12 hours    MEDICATIONS  (PRN):  acetaminophen     Tablet .. 650 milliGRAM(s) Oral every 6 hours PRN Temp greater or equal to 38C (100.4F), Mild Pain (1 - 3), Moderate Pain (4 - 6)      CAPILLARY BLOOD GLUCOSE      POCT Blood Glucose.: 137 mg/dL (08 Jan 2022 21:35)  POCT Blood Glucose.: 100 mg/dL (08 Jan 2022 17:12)  POCT Blood Glucose.: 123 mg/dL (08 Jan 2022 12:22)    I&O's Summary    08 Jan 2022 07:01  -  09 Jan 2022 07:00  --------------------------------------------------------  IN: 250 mL / OUT: 2950 mL / NET: -2700 mL        Vital Signs Last 24 Hrs  T(C): 36.8 (09 Jan 2022 04:38), Max: 36.9 (08 Jan 2022 08:45)  T(F): 98.2 (09 Jan 2022 04:38), Max: 98.4 (08 Jan 2022 08:45)  HR: 96 (09 Jan 2022 04:38) (95 - 98)  BP: 93/72 (09 Jan 2022 04:38) (88/68 - 96/63)  BP(mean): 79 (09 Jan 2022 04:38) (73 - 79)  RR: 18 (09 Jan 2022 04:38) (18 - 18)  SpO2: 97% (09 Jan 2022 04:38) (97% - 99%)    PHYSICAL EXAM:  GENERAL: NAD, well-developed, well-nourished  HEAD: Atraumatic, Normocephalic  EYES: EOMI, PERRLA, conjunctiva and sclera clear  NECK: Supple, No JVD  CHEST/LUNG: Clear to auscultation bilaterally; No wheezes or crackles  HEART: Normal S1/S2; Regular rate and rhythm; No murmurs, rubs, or gallops  ABDOMEN: Soft, Nontender, Nondistended; Bowel sounds present  EXTREMITIES: 2+ Peripheral Pulses; No clubbing, cyanosis, or edema  PSYCH: A&Ox3  NEUROLOGY: no focal neurologic deficit  SKIN: No rashes or lesions    LABS:                        9.8    6.24  )-----------( 220      ( 09 Jan 2022 06:58 )             29.3      01-09    130<L>  |  95<L>  |  16  ----------------------------<  85  4.2   |  24  |  0.71    Ca    9.9      09 Jan 2022 06:58  Phos  3.3     01-09  Mg     1.7     01-09    TPro  9.1<H>  /  Alb  3.6  /  TBili  0.3  /  DBili  x   /  AST  17  /  ALT  11  /  AlkPhos  130<H>  01-09              RADIOLOGY & ADDITIONAL TESTS:    Imaging Personally Reviewed:    Consultant(s) Notes Reviewed:      Care Discussed with Consultants/Other Providers:   *************************************  Shirin Vallecillo M.D. | PGY-2  Department of Internal Medicine  [phone] ([facility]) | [id number] ([facility])  *************************************      Patient is a 66y old  Male who presents with a chief complaint of COVID (+) with AMS and hypotension (08 Jan 2022 07:34)      SUBJECTIVE / OVERNIGHT EVENTS: no events overnight. No events on telemetry. This AM, states some discomfort at ostomy site. Denies chest pain, dyspnea, abdominal pain, nausea/vomiting, diarrhea/constipation, dysuria.     12 point ROS negative except as noted above.     MEDICATIONS  (STANDING):  aspirin  chewable 81 milliGRAM(s) Oral daily  baclofen 5 milliGRAM(s) Oral every 8 hours  chlorhexidine 2% Cloths 1 Application(s) Topical <User Schedule>  cholecalciferol 1000 Unit(s) Oral daily  dextrose 50% Injectable 50 milliLiter(s) IV Push every 15 minutes  dextrose 50% Injectable 25 milliLiter(s) IV Push every 15 minutes  enoxaparin Injectable 30 milliGRAM(s) SubCutaneous daily  levoFLOXacin  Tablet 500 milliGRAM(s) Oral every 24 hours  magnesium oxide 400 milliGRAM(s) Oral three times a day with meals  magnesium sulfate  IVPB 2 Gram(s) IV Intermittent once  methimazole 10 milliGRAM(s) Oral daily  metoclopramide 10 milliGRAM(s) Oral every 8 hours  metoprolol succinate ER 50 milliGRAM(s) Oral daily  mirtazapine 7.5 milliGRAM(s) Oral at bedtime  multivitamin 1 Tablet(s) Oral daily  pantoprazole    Tablet 40 milliGRAM(s) Oral every 12 hours  potassium phosphate / sodium phosphate Tablet (K-PHOS No. 2) 1 Tablet(s) Oral two times a day  sertraline 100 milliGRAM(s) Oral daily  sodium chloride 1 Gram(s) Oral every 12 hours  sodium chloride 0.9% lock flush 3 milliLiter(s) IV Push every 8 hours  spironolactone 25 milliGRAM(s) Oral daily  vancomycin    Solution 125 milliGRAM(s) Oral every 12 hours    MEDICATIONS  (PRN):  acetaminophen     Tablet .. 650 milliGRAM(s) Oral every 6 hours PRN Temp greater or equal to 38C (100.4F), Mild Pain (1 - 3), Moderate Pain (4 - 6)      CAPILLARY BLOOD GLUCOSE      POCT Blood Glucose.: 137 mg/dL (08 Jan 2022 21:35)  POCT Blood Glucose.: 100 mg/dL (08 Jan 2022 17:12)  POCT Blood Glucose.: 123 mg/dL (08 Jan 2022 12:22)    I&O's Summary    08 Jan 2022 07:01  -  09 Jan 2022 07:00  --------------------------------------------------------  IN: 250 mL / OUT: 2950 mL / NET: -2700 mL        Vital Signs Last 24 Hrs  T(C): 36.8 (09 Jan 2022 04:38), Max: 36.9 (08 Jan 2022 08:45)  T(F): 98.2 (09 Jan 2022 04:38), Max: 98.4 (08 Jan 2022 08:45)  HR: 96 (09 Jan 2022 04:38) (95 - 98)  BP: 93/72 (09 Jan 2022 04:38) (88/68 - 96/63)  BP(mean): 79 (09 Jan 2022 04:38) (73 - 79)  RR: 18 (09 Jan 2022 04:38) (18 - 18)  SpO2: 97% (09 Jan 2022 04:38) (97% - 99%)    PHYSICAL EXAM:  GENERAL: NAD, thin  HEAD: Atraumatic, Normocephalic  EYES: EOMI, PERRLA, conjunctiva and sclera clear  NECK: Supple, No JVD. Tracheostomy site s/p removal, bandage clean, dry and intact.   CHEST/LUNG: Clear to auscultation bilaterally; No wheezes or crackles  HEART: Normal S1/S2; Regular rate and rhythm; No murmurs, rubs, or gallops  ABDOMEN: Soft, Nontender, Nondistended; Bowel sounds present. Ostomy bag in RLQ, site clean, dry and intact, bag collecting green fluid.   EXTREMITIES: 2+ Peripheral Pulses; No clubbing, cyanosis, or edema  PSYCH: A&Ox3  NEUROLOGY: no focal neurologic deficit  SKIN: No rashes or lesions    LABS:                        9.8    6.24  )-----------( 220      ( 09 Jan 2022 06:58 )             29.3      01-09    130<L>  |  95<L>  |  16  ----------------------------<  85  4.2   |  24  |  0.71    Ca    9.9      09 Jan 2022 06:58  Phos  3.3     01-09  Mg     1.7     01-09    TPro  9.1<H>  /  Alb  3.6  /  TBili  0.3  /  DBili  x   /  AST  17  /  ALT  11  /  AlkPhos  130<H>  01-09              RADIOLOGY & ADDITIONAL TESTS:    Imaging Personally Reviewed:    Consultant(s) Notes Reviewed:      Care Discussed with Consultants/Other Providers:

## 2022-01-09 NOTE — PROGRESS NOTE ADULT - PROBLEM SELECTOR PLAN 4
-hydralazine discontinued  -c/w spironolactone 25 mg QD  -c/w metoprolol succinate 50 mg QD  -replete Mg to 2-2.5, K 4-4.5  -Daily PT  -Encourage oral intake and nutrition, salt tabs

## 2022-01-09 NOTE — PROGRESS NOTE ADULT - ASSESSMENT
65M PMHx VAD placement history of COVID infection in April 2020, history of a prolonged hospitalization in 2021 with recurrent sepsis, pneumonia, intubated/trach x2 cycles , chronic gall bladder disease s/p perc dawn, SMA ischemia requiring a stent placement, cachexia who was discharged to rehab but is presented from [facility] on 12/13 with AMS, hypotensive, tachycardic found to be COVID (+). Pt lethargic and mumbling words required ICU admission and pressor support now titrated off and downgraded to the floor. Awaiting dispo. Likely going home after optimization w/ PT.

## 2022-01-09 NOTE — PROGRESS NOTE ADULT - PROBLEM SELECTOR PLAN 1
History of recurrent pneumonia and bacteremia; With stenotrophomonas in prior sputum cultures and enterobacter and serratia in blood in October 2021  -Blood cultures 12/13 + Serratia  -c/w Cefepime (12/30- currently)  -repeat bcx NGTD  -Spoke w/ Dr. Prater, ID service, today concerning DC plan:    =pt will continue on lifelong Levaquin 500mg QD for suppression therapy    =PO vanc 125mg BID for C. diff ppx    =no longer candidate for bactrim as not tolerated by pt

## 2022-01-10 NOTE — PROGRESS NOTE ADULT - ASSESSMENT
67 y/o M with a history of Stage D NICM s/p HM2 LVAD in 9/2017 at  (due to severe PAD) with TV ring, multiple GI bleeds, thus maintained off AC, CKD 3prior COVID-19 infection in 4/2020, recurrent syncope post LVAD s/p ILR, s/p prolonged complex hospitalization from 6/14-11/16/2021 initially admitted with abdominal pain complicated by GIB, respiratory failure s/p trach, multiple infections, s/p cholecystotomy tube and SMA stent 10/2021, ultimately discharged to [facility] rehab and then returned to [facility] for trach decannulation 12/2 who now presents with recurrent COVID-19 infection, initially in distributive shock. 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     He had elevated inflammatory markers which are now improving. He received monoclonal antibodies and was started on remdesivir and dexamethasone. He's afebrile with BCx from 12/18 and 12/30 NGTD. He had an episode of hypoxia noted 1/1, possibly in the setting of aspiration. He is currently saturating well on RA. He had runs of MMVT in setting of hypokalemia and being off his beta blocker which has since resolved. He currently appears well compensated from HF perspective. His LDH is stable. His LVAD is functioning well without s/s of pump malfunction.     He is medically ready for discharge, currently awaiting for accepting [facility] facility.

## 2022-01-10 NOTE — PROGRESS NOTE ADULT - ATTENDING COMMENTS
67 yo M w/ stage D NICM s/p LVAD, not on AC for hx GIB, recent prolonged admission who was admitted for COVID PNA. Course c/b serratia bacteremia, unclear source but patient has prior history. s/p MA, remdesivir, dexamethasone for COVID and cefepime for bacteremia. Needs to be on lifelong suppressive levaquin.  ID following, on PO vanco for hx of c-diff   HF recommendations appreciated.   Patient had unwitnessed fall today in the bathroom when he attempted to get off the toilet without assistance. Found on his left side. No pain, ecchymosis, swelling. Obtain L knee/pelvic xrays, CTH.   Ideally d/c planning to [facility] w/ LVAD capabilities but no accepting facilities. Daily PT to attempt to optimize for home.   Rest as above.

## 2022-01-10 NOTE — PROGRESS NOTE ADULT - SUBJECTIVE AND OBJECTIVE BOX
PROGRESS NOTE:   Authored by Dr. Nikolai Johnston MD Pager [phone] [facility], [place] [address]    Patient is a 66y old  Male who presents with a chief complaint of COVID (+) with AMS and hypotension (09 Jan 2022 07:43)      SUBJECTIVE / OVERNIGHT EVENTS:    ADDITIONAL REVIEW OF SYSTEMS:    MEDICATIONS  (STANDING):  aspirin  chewable 81 milliGRAM(s) Oral daily  baclofen 5 milliGRAM(s) Oral every 8 hours  chlorhexidine 2% Cloths 1 Application(s) Topical <User Schedule>  cholecalciferol 1000 Unit(s) Oral daily  dextrose 50% Injectable 50 milliLiter(s) IV Push every 15 minutes  dextrose 50% Injectable 25 milliLiter(s) IV Push every 15 minutes  enoxaparin Injectable 30 milliGRAM(s) SubCutaneous daily  levoFLOXacin  Tablet 500 milliGRAM(s) Oral every 24 hours  magnesium oxide 400 milliGRAM(s) Oral three times a day with meals  magnesium sulfate  IVPB 2 Gram(s) IV Intermittent once  methimazole 10 milliGRAM(s) Oral daily  metoclopramide 10 milliGRAM(s) Oral every 8 hours  metoprolol succinate ER 50 milliGRAM(s) Oral daily  mirtazapine 7.5 milliGRAM(s) Oral at bedtime  multivitamin 1 Tablet(s) Oral daily  pantoprazole    Tablet 40 milliGRAM(s) Oral every 12 hours  potassium phosphate / sodium phosphate Tablet (K-PHOS No. 2) 1 Tablet(s) Oral two times a day  sertraline 100 milliGRAM(s) Oral daily  sodium chloride 1 Gram(s) Oral every 12 hours  sodium chloride 0.9% lock flush 3 milliLiter(s) IV Push every 8 hours  spironolactone 25 milliGRAM(s) Oral daily  vancomycin    Solution 125 milliGRAM(s) Oral every 12 hours    MEDICATIONS  (PRN):  acetaminophen     Tablet .. 650 milliGRAM(s) Oral every 6 hours PRN Temp greater or equal to 38C (100.4F), Mild Pain (1 - 3), Moderate Pain (4 - 6)      CAPILLARY BLOOD GLUCOSE        I&O's Summary    09 Jan 2022 07:01  -  10 Norman 2022 07:00  --------------------------------------------------------  IN: 0 mL / OUT: 360 mL / NET: -360 mL        PHYSICAL EXAM:  Vital Signs Last 24 Hrs  T(C): 36.4 (10 Norman 2022 08:00), Max: 36.9 (09 Jan 2022 13:41)  T(F): 97.6 (10 Norman 2022 08:00), Max: 98.4 (09 Jan 2022 13:41)  HR: 103 (10 Norman 2022 08:00) (91 - 118)  BP: 81/61 (10 Norman 2022 08:00) (81/61 - 99/69)  BP(mean): 68 (10 Norman 2022 08:00) (68 - 79)  RR: 17 (10 Norman 2022 08:00) (17 - 18)  SpO2: 98% (10 Norman 2022 08:00) (95% - 99%)  GENERAL: NAD, lying comfortably in bed  HEAD: Atraumatic, normocephalic  EYES: EOMI b/l PERRLA b/l, conjunctiva and sclera clear  NECK: Supple, No JVD, No LAD  RESPIRATORY: Normal respiratory effort; lungs are clear to auscultation bilaterally  CARDIOVASCULAR: Regular rate and rhythm, normal S1 and S2, no murmur/rub/gallop; No lower extremity edema; Peripheral pulses are 2+ bilaterally  ABDOMEN: Nontender to palpation, normoactive bowel sounds, no rebound/guarding; No hepatosplenomegaly  MUSCLOSKELETAL: no clubbing or cyanosis of digits; no joint swelling or tenderness to palpation  NEURO: Non focal   PSYCH: A+O to person, place, and time; affect appropriate    LABS:                        10.0   8.15  )-----------( 198      ( 10 Norman 2022 06:31 )             30.1     01-10    128<L>  |  94<L>  |  19  ----------------------------<  93  4.1   |  20<L>  |  0.79    Ca    9.7      10 Norman 2022 06:28  Phos  2.8     01-10  Mg     1.6     01-10    TPro  8.9<H>  /  Alb  3.5  /  TBili  0.3  /  DBili  x   /  AST  19  /  ALT  13  /  AlkPhos  140<H>  01-10                Tele Reviewed:    RADIOLOGY & ADDITIONAL TESTS:  Results Reviewed:   Imaging Personally Reviewed:  Electrocardiogram Personally Reviewed:    COORDINATION OF CARE:  Care Discussed with Consultants/Other Providers [Y/N]:  Prior or Outpatient Records Reviewed [Y/N]:   PROGRESS NOTE:   Authored by Dr. Nikolai Johnston MD Pager [phone] [facility], [place] [address]    Patient is a 66y old  Male who presents with a chief complaint of COVID (+) with AMS and hypotension (09 Jan 2022 07:43)      SUBJECTIVE / OVERNIGHT EVENTS: patient has some mild RUQ pain near perc dawn site however without additional symptoms  no overnight events  patient denies headaches fevers chills chest pain shortness of breath, nausea, vomiting, diarrhea, constipation, urinary dysuria, new leg swelling    ADDITIONAL REVIEW OF SYSTEMS:    MEDICATIONS  (STANDING):  aspirin  chewable 81 milliGRAM(s) Oral daily  baclofen 5 milliGRAM(s) Oral every 8 hours  chlorhexidine 2% Cloths 1 Application(s) Topical <User Schedule>  cholecalciferol 1000 Unit(s) Oral daily  dextrose 50% Injectable 50 milliLiter(s) IV Push every 15 minutes  dextrose 50% Injectable 25 milliLiter(s) IV Push every 15 minutes  enoxaparin Injectable 30 milliGRAM(s) SubCutaneous daily  levoFLOXacin  Tablet 500 milliGRAM(s) Oral every 24 hours  magnesium oxide 400 milliGRAM(s) Oral three times a day with meals  magnesium sulfate  IVPB 2 Gram(s) IV Intermittent once  methimazole 10 milliGRAM(s) Oral daily  metoclopramide 10 milliGRAM(s) Oral every 8 hours  metoprolol succinate ER 50 milliGRAM(s) Oral daily  mirtazapine 7.5 milliGRAM(s) Oral at bedtime  multivitamin 1 Tablet(s) Oral daily  pantoprazole    Tablet 40 milliGRAM(s) Oral every 12 hours  potassium phosphate / sodium phosphate Tablet (K-PHOS No. 2) 1 Tablet(s) Oral two times a day  sertraline 100 milliGRAM(s) Oral daily  sodium chloride 1 Gram(s) Oral every 12 hours  sodium chloride 0.9% lock flush 3 milliLiter(s) IV Push every 8 hours  spironolactone 25 milliGRAM(s) Oral daily  vancomycin    Solution 125 milliGRAM(s) Oral every 12 hours    MEDICATIONS  (PRN):  acetaminophen     Tablet .. 650 milliGRAM(s) Oral every 6 hours PRN Temp greater or equal to 38C (100.4F), Mild Pain (1 - 3), Moderate Pain (4 - 6)      CAPILLARY BLOOD GLUCOSE        I&O's Summary    09 Jan 2022 07:01  -  10 Norman 2022 07:00  --------------------------------------------------------  IN: 0 mL / OUT: 360 mL / NET: -360 mL        PHYSICAL EXAM:  Vital Signs Last 24 Hrs  T(C): 36.4 (10 Norman 2022 08:00), Max: 36.9 (09 Jan 2022 13:41)  T(F): 97.6 (10 Norman 2022 08:00), Max: 98.4 (09 Jan 2022 13:41)  HR: 103 (10 Norman 2022 08:00) (91 - 118)  BP: 81/61 (10 Norman 2022 08:00) (81/61 - 99/69)  BP(mean): 68 (10 Norman 2022 08:00) (68 - 79)  RR: 17 (10 Norman 2022 08:00) (17 - 18)  SpO2: 98% (10 Norman 2022 08:00) (95% - 99%)  GENERAL: NAD, lying comfortably in bed  HEAD: Atraumatic, normocephalic  EYES: EOMI b/l PERRLA b/l, conjunctiva and sclera clear  NECK: Supple, No JVD, No LAD  RESPIRATORY: Normal respiratory effort; lungs are clear to auscultation bilaterally  CARDIOVASCULAR: Regular rate and rhythm, normal S1 and S2, no murmur/rub/gallop; No lower extremity edema; Peripheral pulses are 2+ bilaterally  ABDOMEN: tender to palpation of RUQ near perc dawn drain which is unchanged from prior, LVAD drive line wihout issue , normoactive bowel sounds, no rebound/guarding; No hepatosplenomegaly  MUSCLOSKELETAL: no clubbing or cyanosis of digits; no joint swelling or tenderness to palpation  NEURO: Non focal   PSYCH: A+O to person, place, and time; affect appropriate      LABS:                        10.0   8.15  )-----------( 198      ( 10 Norman 2022 06:31 )             30.1     01-10    128<L>  |  94<L>  |  19  ----------------------------<  93  4.1   |  20<L>  |  0.79    Ca    9.7      10 Norman 2022 06:28  Phos  2.8     01-10  Mg     1.6     01-10    TPro  8.9<H>  /  Alb  3.5  /  TBili  0.3  /  DBili  x   /  AST  19  /  ALT  13  /  AlkPhos  140<H>  01-10                Tele Reviewed:    RADIOLOGY & ADDITIONAL TESTS:  Results Reviewed:   Imaging Personally Reviewed:  Electrocardiogram Personally Reviewed:    COORDINATION OF CARE:  Care Discussed with Consultants/Other Providers [Y/N]:  Prior or Outpatient Records Reviewed [Y/N]:

## 2022-01-10 NOTE — PROGRESS NOTE ADULT - PROBLEM SELECTOR PLAN 2
COVID PCR + 12/13. Recent CXR clear, respiratory status stable on room air; s/p monoclonal antibodies Regeneron, s/p 5 day course of Remdesivir, s/p 10 day course of Dexamethasone  -repeat COVID PCR 12/26, 12/28 positive  -COVID negative 1/3/2022 History of recurrent pneumonia and bacteremia; With stenotrophomonas in prior sputum cultures and enterobacter and serratia in blood in October 2021  -Blood cultures 12/13 + Serratia  -c/w Cefepime (12/30- currently)  -repeat bcx NGTD  -Spoke w/ Dr. Prater, ID service, today concerning DC plan:    =pt will continue on lifelong Levaquin 500mg QD for suppression therapy    =PO vanc 125mg BID for C. diff ppx    =no longer candidate for bactrim as not tolerated by pt

## 2022-01-10 NOTE — PROGRESS NOTE ADULT - SUBJECTIVE AND OBJECTIVE BOX
Subjective: Patient seen and examined resting in bed.     Medications:  acetaminophen     Tablet .. 650 milliGRAM(s) Oral every 6 hours PRN  aspirin  chewable 81 milliGRAM(s) Oral daily  baclofen 5 milliGRAM(s) Oral every 8 hours  chlorhexidine 2% Cloths 1 Application(s) Topical <User Schedule>  cholecalciferol 1000 Unit(s) Oral daily  dextrose 50% Injectable 50 milliLiter(s) IV Push every 15 minutes  dextrose 50% Injectable 25 milliLiter(s) IV Push every 15 minutes  enoxaparin Injectable 30 milliGRAM(s) SubCutaneous daily  levoFLOXacin  Tablet 500 milliGRAM(s) Oral every 24 hours  magnesium oxide 400 milliGRAM(s) Oral three times a day with meals  magnesium sulfate  IVPB 2 Gram(s) IV Intermittent once  methimazole 10 milliGRAM(s) Oral daily  metoclopramide 10 milliGRAM(s) Oral every 8 hours  metoprolol succinate ER 50 milliGRAM(s) Oral daily  mirtazapine 7.5 milliGRAM(s) Oral at bedtime  multivitamin 1 Tablet(s) Oral daily  pantoprazole    Tablet 40 milliGRAM(s) Oral every 12 hours  potassium phosphate / sodium phosphate Tablet (K-PHOS No. 2) 1 Tablet(s) Oral two times a day  sertraline 100 milliGRAM(s) Oral daily  sodium chloride 1 Gram(s) Oral every 12 hours  sodium chloride 0.9% lock flush 3 milliLiter(s) IV Push every 8 hours  spironolactone 25 milliGRAM(s) Oral daily  vancomycin    Solution 125 milliGRAM(s) Oral every 12 hours    Vitals:  Vital Signs Last 24 Hours  T(C): 36.4 (01-10-22 @ 08:00), Max: 36.9 (22 @ 13:41)  HR: 104 (01-10-22 @ 12:37) (95 - 118)  BP: 81/61 (01-10-22 @ 08:00) (81/61 - 99/69)  RR: 17 (01-10-22 @ 08:00) (17 - 18)  SpO2: 98% (01-10-22 @ 08:00) (95% - 99%)    Weight in k.2 (01-10 @ 09:54)    I&O's Summary    2022 07:01  -  10 Norman 2022 07:00  --------------------------------------------------------  IN: 0 mL / OUT: 360 mL / NET: -360 mL        Physical Exam  General: No distress. Comfortable.  HEENT: EOM intact.  Neck: Neck supple. JVP not elevated. No masses  Chest: Clear to auscultation bilaterally  CV: +VAD hum  Abdomen: Soft, non-distended, non-tender, +bilary drain   Skin: No rashes or skin breakdown  Neurology: Alert and oriented times three.     LVAD Interrogation  VAD TYPE HM 2  Speed 9200  Flow 4.4  Power 5.2  PI  4.4  Assessment of driveline exit site: driveline dressing c/d/i  Programming changes: no changes made    Labs:                        10.0   8.15  )-----------( 198      ( 10 Norman 2022 06:31 )             30.1     01-10    128<L>  |  94<L>  |  19  ----------------------------<  93  4.1   |  20<L>  |  0.79    Ca    9.7      10 Norman 2022 06:28  Phos  2.8     01-10  Mg     1.6     01-10    TPro  8.9<H>  /  Alb  3.5  /  TBili  0.3  /  DBili  x   /  AST  19  /  ALT  13  /  AlkPhos  140<H>  01-10              Lactate Dehydrogenase, Serum: 237 U/L (01-10 @ 06:28)  Lactate Dehydrogenase, Serum: 213 U/L ( @ 06:58)  Lactate Dehydrogenase, Serum: 203 U/L ( @ 06:48)

## 2022-01-10 NOTE — PROGRESS NOTE ADULT - PROBLEM SELECTOR PLAN 3
Patient with suspected aspiration overnight, had episode of hypoxia with increased secretions  - no gross evidence of ongoing infection at this time  - patient currently comfortable on RA  - possible that patient may have had aspiration event  - CXR negative  - MBS passed (1/3/22) = pt now on regular diet COVID PCR + 12/13. Recent CXR clear, respiratory status stable on room air; s/p monoclonal antibodies Regeneron, s/p 5 day course of Remdesivir, s/p 10 day course of Dexamethasone  -repeat COVID PCR 12/26, 12/28 positive  -COVID negative 1/3/2022

## 2022-01-10 NOTE — PROGRESS NOTE ADULT - ASSESSMENT
65M PMHx VAD placement history of COVID infection in April 2020, history of a prolonged hospitalization in 2021 with recurrent sepsis, pneumonia, intubated/trach x2 cycles , chronic gall bladder disease s/p perc dawn, SMA ischemia requiring a stent placement, cachexia who was discharged to rehab but is presented from [facility] on 12/13 with AMS, hypotensive, tachycardic found to be COVID (+). Pt lethargic and mumbling words required ICU admission and pressor support now titrated off and downgraded to the floor. Awaiting dispo. Likely going home after optimization w/ PT.

## 2022-01-10 NOTE — PROGRESS NOTE ADULT - PROBLEM SELECTOR PLAN 5
-LDH QD  -Continue ASA 81 mg QD -hydralazine discontinued  -c/w spironolactone 25 mg QD  -c/w metoprolol succinate 50 mg QD  -replete Mg to 2-2.5, K 4-4.5  -Daily PT  -Encourage oral intake and nutrition, salt tabs

## 2022-01-10 NOTE — PROGRESS NOTE ADULT - PROBLEM SELECTOR PLAN 8
S/p stent 10/2021  -Continue ASA 81 mg QD Per previous records - Endocrine consulted for management of hyperthyroidism in the setting of recent amiodarone use and multiple IV contrast studies. 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 Unable to get a thyroid US due to his trach.   -continue methimazole 10mg QD  -FT4 and TSH wnl

## 2022-01-10 NOTE — PROGRESS NOTE ADULT - PROBLEM SELECTOR PLAN 9
-Diet: Regular  -DVT: lovenox 30mg QD  -Dispo: [facility] (found facility accepting LVAD; however due to COVID outbreak no longer accepting); now home w/ home PT S/p stent 10/2021  -Continue ASA 81 mg QD

## 2022-01-10 NOTE — PROGRESS NOTE ADULT - ATTENDING COMMENTS
Patient with HM2 LVAD admitted with COVID, now recovered, awaiting placement at rehab. Monitor electrolytes and encourage PO intake.

## 2022-01-10 NOTE — PROGRESS NOTE ADULT - PROBLEM SELECTOR PLAN 7
Per previous records - Endocrine consulted for management of hyperthyroidism in the setting of recent amiodarone use and multiple IV contrast studies. 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 Unable to get a thyroid US due to his trach.   -continue methimazole 10mg QD  -FT4 and TSH wnl -c/w oral vancomycin 125mg BID

## 2022-01-10 NOTE — PROGRESS NOTE ADULT - PROBLEM SELECTOR PLAN 1
History of recurrent pneumonia and bacteremia; With stenotrophomonas in prior sputum cultures and enterobacter and serratia in blood in October 2021  -Blood cultures 12/13 + Serratia  -c/w Cefepime (12/30- currently)  -repeat bcx NGTD  -Spoke w/ Dr. Prater, ID service, today concerning DC plan:    =pt will continue on lifelong Levaquin 500mg QD for suppression therapy    =PO vanc 125mg BID for C. diff ppx    =no longer candidate for bactrim as not tolerated by pt patient with mild RUQ TTP, no N/V/D, fevers  LFTs normal, alk phos stable

## 2022-01-10 NOTE — PROGRESS NOTE ADULT - PROBLEM SELECTOR PLAN 4
-hydralazine discontinued  -c/w spironolactone 25 mg QD  -c/w metoprolol succinate 50 mg QD  -replete Mg to 2-2.5, K 4-4.5  -Daily PT  -Encourage oral intake and nutrition, salt tabs Patient with suspected aspiration overnight, had episode of hypoxia with increased secretions  - no gross evidence of ongoing infection at this time  - patient currently comfortable on RA  - possible that patient may have had aspiration event  - CXR negative  - MBS passed (1/3/22) = pt now on regular diet

## 2022-01-10 NOTE — PROGRESS NOTE ADULT - PROBLEM SELECTOR PLAN 10
-Diet: Regular  -DVT: lovenox 30mg QD  -Dispo: [facility] (found facility accepting LVAD; however due to COVID outbreak no longer accepting); now home w/ home PT after optimization

## 2022-01-11 NOTE — PROGRESS NOTE ADULT - ASSESSMENT
65M PMHx VAD placement history of COVID infection in April 2020, history of a prolonged hospitalization in 2021 with recurrent sepsis, pneumonia, intubated/trach x2 cycles , chronic gall bladder disease s/p perc dawn, SMA ischemia requiring a stent placement, cachexia who was discharged to rehab but is presented from [facility] on 12/13 with AMS, hypotensive, tachycardic found to be COVID (+). Pt lethargic and mumbling words required ICU admission and pressor support now titrated off and downgraded to the floor. Awaiting dispo. Likely going home after optimization w/ PT.

## 2022-01-11 NOTE — PROGRESS NOTE ADULT - PROBLEM SELECTOR PLAN 7
- on 1/10 he fell while on his way to the bathroom  - xray of knee was unremarkable  - ct head 1/10 negative  - now since falling, he has develop a headache that is worsening. please repeat CT head

## 2022-01-11 NOTE — PROGRESS NOTE ADULT - ASSESSMENT
67 y/o M with a history of Stage D NICM s/p HM2 LVAD in 9/2017 at  (due to severe PAD) with TV ring, multiple GI bleeds, thus maintained off AC, CKD 3prior COVID-19 infection in 4/2020, recurrent syncope post LVAD s/p ILR, s/p prolonged complex hospitalization from 6/14-11/16/2021 initially admitted with abdominal pain complicated by GIB, respiratory failure s/p trach, multiple infections, s/p cholecystotomy tube and SMA stent 10/2021, ultimately discharged to [facility] rehab and then returned to [facility] for trach decannulation 12/2 who now presents with recurrent COVID-19 infection, initially in distributive shock. 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     He had elevated inflammatory markers which are now improving. He received monoclonal antibodies and was started on remdesivir and dexamethasone. He's afebrile with BCx from 12/18 and 12/30 NGTD. He had an episode of hypoxia noted 1/1, possibly in the setting of aspiration. He is currently saturating well on RA. He had runs of MMVT in setting of hypokalemia and being off his beta blocker which has since resolved. He currently appears well compensated from HF perspective. His LDH is stable. His LVAD is functioning well without s/s of pump malfunction.     He is medically ready for discharge, currently awaiting for accepting [facility] facility.

## 2022-01-11 NOTE — PROGRESS NOTE ADULT - SUBJECTIVE AND OBJECTIVE BOX
Patient is a 66y old  Male who presents with a chief complaint of COVID (+) with AMS and hypotension (11 Jan 2022 07:33)      SUBJECTIVE / OVERNIGHT EVENTS: translating in creole by RN at bedside, reports some substernal chest pain that began yesterday, no palpitaions, sob, abd pain     MEDICATIONS  (STANDING):  aspirin  chewable 81 milliGRAM(s) Oral daily  baclofen 5 milliGRAM(s) Oral every 8 hours  chlorhexidine 2% Cloths 1 Application(s) Topical <User Schedule>  cholecalciferol 1000 Unit(s) Oral daily  dextrose 50% Injectable 50 milliLiter(s) IV Push every 15 minutes  dextrose 50% Injectable 25 milliLiter(s) IV Push every 15 minutes  enoxaparin Injectable 30 milliGRAM(s) SubCutaneous daily  levoFLOXacin  Tablet 500 milliGRAM(s) Oral every 24 hours  magnesium oxide 400 milliGRAM(s) Oral three times a day with meals  methimazole 10 milliGRAM(s) Oral daily  metoclopramide 10 milliGRAM(s) Oral every 8 hours  metoprolol succinate ER 50 milliGRAM(s) Oral daily  mirtazapine 7.5 milliGRAM(s) Oral at bedtime  multivitamin 1 Tablet(s) Oral daily  pantoprazole    Tablet 40 milliGRAM(s) Oral every 12 hours  potassium phosphate / sodium phosphate Tablet (K-PHOS No. 2) 1 Tablet(s) Oral two times a day  sertraline 100 milliGRAM(s) Oral daily  sodium chloride 1 Gram(s) Oral every 12 hours  sodium chloride 0.9% lock flush 3 milliLiter(s) IV Push every 8 hours  spironolactone 25 milliGRAM(s) Oral daily  vancomycin    Solution 125 milliGRAM(s) Oral every 12 hours    MEDICATIONS  (PRN):  acetaminophen     Tablet .. 650 milliGRAM(s) Oral every 6 hours PRN Temp greater or equal to 38C (100.4F), Mild Pain (1 - 3), Moderate Pain (4 - 6)        CAPILLARY BLOOD GLUCOSE      POCT Blood Glucose.: 188 mg/dL (10 Norman 2022 21:32)  POCT Blood Glucose.: 131 mg/dL (10 Norman 2022 17:30)    I&O's Summary    10 Norman 2022 07:01  -  11 Jan 2022 07:00  --------------------------------------------------------  IN: 240 mL / OUT: 805 mL / NET: -565 mL        PHYSICAL EXAM:  GENERAL: NAD, lying comfortably in bed  HEAD: Atraumatic, normocephalic  EYES: EOMI b/l PERRLA b/l, conjunctiva and sclera clear  NECK: Supple, No JVD, No LAD  RESPIRATORY: Normal respiratory effort; lungs are clear to auscultation bilaterally  CARDIOVASCULAR: Regular rate and rhythm, normal S1 and S2, no murmur/rub/gallop; No lower extremity edema; Peripheral pulses are 2+ bilaterally  ABDOMEN: tender to palpation of RUQ near perc dawn drain which is unchanged from prior, LVAD drive line wihout issue , normoactive bowel sounds, no rebound/guarding; No hepatosplenomegaly  MUSCLOSKELETAL: no clubbing or cyanosis of digits; no joint swelling or tenderness to palpation  NEURO: Non focal   PSYCH: A+O to person, place, and time; affect appropriate    LABS:                        9.1    7.33  )-----------( 188      ( 11 Jan 2022 06:11 )             28.3     01-11    130<L>  |  96  |  17  ----------------------------<  99  4.2   |  23  |  0.81    Ca    9.3      11 Jan 2022 06:11  Phos  3.2     01-11  Mg     2.0     01-11    TPro  8.7<H>  /  Alb  3.3  /  TBili  0.3  /  DBili  x   /  AST  19  /  ALT  11  /  AlkPhos  135<H>  01-11              RADIOLOGY & ADDITIONAL TESTS:    Imaging Personally Reviewed:    Consultant(s) Notes Reviewed:      Care Discussed with Consultants/Other Providers:

## 2022-01-11 NOTE — PROGRESS NOTE ADULT - PROBLEM SELECTOR PLAN 1
- Continue with speed of 9200  - s/p controller change on 12/16   - Daily LDH  - Continue ASA 81 mg daily  - He is off coumadin due to persistent recurrent GI bleeding - Continue with speed of 9200  - s/p controller change on 12/16   - Daily LDH  - Continue ASA 81 mg daily  - Off coumadin due to persistent recurrent GI bleeding

## 2022-01-11 NOTE — PROGRESS NOTE ADULT - ATTENDING COMMENTS
Patient with HM2 LVAD admitted with COVID, now recovered, awaiting placement at rehab. Had a fall in the bathroom yesterday, CT scan of head negative. Ongoing headaches, would repeat imaging.

## 2022-01-11 NOTE — CHART NOTE - NSCHARTNOTEFT_GEN_A_CORE
Nutrition Follow Up Note  Patient seen for: Initial Malnutrition follow-up    Chart reviewed, events noted. Per chart: 66M, PMH of Stage D NICM s/p HM2 LVAD (2017), multiple GI bleeds (thus maintained off AC), CKD 3, prior COVID-19 infection (2020), s/p prolonged complex hospitalization from -2021 initially admitted with abdominal pain c/b GIB, respiratory failure s/p trach, multiple infections, s/p cholecystotomy tube and SMA stent 10/2021, ultimately discharged to [facility] rehab and then returned to [facility] for trach decannulation  who now presents with recurrent COVID-19 infection in shock. 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  He had an episode of hypoxia noted , possibly in the setting of aspiration. He is medically ready for discharge, currently awaiting for accepting [facility] facility    Source: EMR, Team    Diet, Consistent Carbohydrate w/Evening Snack:   Supplement Feeding Modality:  Oral  Glucerna Shake Cans or Servings Per Day:  2       Frequency:  Daily (22 @ 08:16)    Is current diet order appropriate/adequate? [x] Yes  []  No:     PO intake :      Nutrition-Related Events:  - Prolonged hyponatremia - NaCl tabs ordered daily   - Multivitamin, MagOx, and Vitamin D3 supplementation ordered daily  - MBS 1/3 with SLP recommendations for "Regular diet and thin liquids"  - Swallow eval  with SLP recommendations for "Regular solids and thin liquids"  - Remeron ordered since admit   - Upon prior hospitalization was with EN for prolonged period and then advanced to PO diet but was with some dysphagia.  - Insulin Sliding Scale ordered to optimize BG; noted s/p 10 day course of Dexamethasone.    GI:  Last BM 1/10.   Bowel Regimen? [] Yes   [x] No  - Noted on abx course at this time  - Reglan ordered since admit  for gut motility    Daily Weights in K.2 (01-10), 51.1 (), 52 (), 53.8 (), 51.3 (), 50.1 (), 54.6 (), 55.7 (-15)  - Noted weight has downtrended since admit; likely multifactorial as pt has been receiving aldactone as well as with suboptimal PO intake at times. Will continue to monitor/trend weight status     Drug Dosing Weight  Weight (kg): 55.7 ()  BMI (kg/m2): 16.7 ()  IBW: 178 Ibs/80.7 kg    MEDICATIONS  (STANDING):  cholecalciferol  dextrose 50% Injectable  dextrose 50% Injectable  levoFLOXacin  Tablet  magnesium oxide  methimazole  metoclopramide  metoprolol succinate ER  multivitamin  pantoprazole    Tablet  potassium phosphate / sodium phosphate Tablet (K-PHOS No. 2)  sodium chloride  sodium chloride 0.9% lock flush  spironolactone  vancomycin    Solution    Pertinent Labs:  @ 06:11: Na 130<L>, BUN 17, Cr 0.81, BG 99, K+ 4.2, Phos 3.2, Mg 2.0, Alk Phos 135<H>, ALT/SGPT 11, AST/SGOT 19, HbA1c --    A1C with Estimated Average Glucose Result: 5.4 % (08-15-21 @ 13:52)    Finger Sticks:  POCT Blood Glucose.: 188 mg/dL (01-10 @ 21:32)  POCT Blood Glucose.: 131 mg/dL (01-10 @ 17:30)    Skin per nursing documentation: no pressure injuries noted  Edema: none    Estimated Nutritional Needs  Based on 12/15 weight: 122.7 lb/55.6 kg  Energy Needs (30-35 kcals/kg): 3528-3314  Protein Needs (1.5-1.8 g/kg): 83.4-100.08    Previous Nutrition Diagnosis: Severe-chronic Malnutrition & Underweight  Nutrition Diagnosis is: [x] ongoing - addressed with PO diet, oral nutrition and micronutrient supplementation    New Nutrition Diagnosis: [x] Not applicable    Nutrition Care Plan:  [x] In Progress  [] Achieved  [] Not applicable      Recommendations:      1. Continue Current diet as ordered with Glucerna Shake BID  2. Continue micronutrient supplementation as ordered  3. RD to continue providing Magic Cup and dietary preferences as feasible   4. Continue to provide assistance as necessary with encouragment of PO intake at meals/snacks    Monitoring and Evaluation:   Continue to monitor nutritional intake, tolerance to diet prescription, weights, labs, skin integrity  RD remains available upon request and will follow up per protocol    Wendy Travis, MS, RD, CDN, [facility]  pager #[phone] Nutrition Follow Up Note  Patient seen for: Initial Malnutrition follow-up    Chart reviewed, events noted. Per chart: 66M, PMH of Stage D NICM s/p HM2 LVAD (2017), multiple GI bleeds (thus maintained off AC), CKD 3, prior COVID-19 infection (2020), s/p prolonged complex hospitalization from -2021 initially admitted with abdominal pain c/b GIB, respiratory failure s/p trach, multiple infections, s/p cholecystotomy tube and SMA stent 10/2021, ultimately discharged to [facility] rehab and then returned to [facility] for trach decannulation  who now presents with recurrent COVID-19 infection in shock. 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  He had an episode of hypoxia noted , possibly in the setting of aspiration. He is medically ready for discharge, currently awaiting for accepting [facility] facility    Source: EMR, Team    Diet, Consistent Carbohydrate w/Evening Snack:   Supplement Feeding Modality:  Oral  Glucerna Shake Cans or Servings Per Day:  2       Frequency:  Daily (22 @ 08:16)    Is current diet order appropriate/adequate? [x] Yes  []  No:     Pt asleep upon RD visit; RN notes pt with <50% intake at meals. Discussed with RN trying to encourage PO intake at meals/snacks and trying to have pt focus on protein at meals. Also noted to RN trying to encourage pt to consume oral nutrition supplements between meals.    Nutrition-Related Events:  - Prolonged hyponatremia - NaCl tabs ordered daily   - Multivitamin, MagOx, and Vitamin D3 supplementation ordered daily  - MBS 1/3 with SLP recommendations for "Regular diet and thin liquids"  - Swallow eval  with SLP recommendations for "Regular solids and thin liquids"  - Remeron ordered since admit   - Upon prior hospitalization was with EN for prolonged period and then advanced to PO diet but was with some dysphagia.  - Insulin Sliding Scale ordered to optimize BG; noted s/p 10 day course of Dexamethasone.    GI:  Last BM 1/10.   Bowel Regimen? [] Yes   [x] No  - Noted on abx course at this time  - Reglan ordered since admit  for gut motility    Daily Weights in K.2 (01-10), 51.1 (), 52 (), 53.8 (), 51.3 (), 50.1 (), 54.6 (), 55.7 (12-15)  - Noted weight has downtrended since admit; likely multifactorial as pt has been receiving aldactone as well as with suboptimal PO intake at times. Will continue to monitor/trend weight status     Drug Dosing Weight  Weight (kg): 55.7 ()  BMI (kg/m2): 16.7 ()  IBW: 178 Ibs/80.7 kg    MEDICATIONS  (STANDING):  cholecalciferol  dextrose 50% Injectable  dextrose 50% Injectable  levoFLOXacin  Tablet  magnesium oxide  methimazole  metoclopramide  metoprolol succinate ER  multivitamin  pantoprazole    Tablet  potassium phosphate / sodium phosphate Tablet (K-PHOS No. 2)  sodium chloride  sodium chloride 0.9% lock flush  spironolactone  vancomycin    Solution    Pertinent Labs:  @ 06:11: Na 130<L>, BUN 17, Cr 0.81, BG 99, K+ 4.2, Phos 3.2, Mg 2.0, Alk Phos 135<H>, ALT/SGPT 11, AST/SGOT 19, HbA1c --    A1C with Estimated Average Glucose Result: 5.4 % (08-15-21 @ 13:52)    Finger Sticks:  POCT Blood Glucose.: 188 mg/dL (01-10 @ 21:32)  POCT Blood Glucose.: 131 mg/dL (01-10 @ 17:30)    Skin per nursing documentation: no pressure injuries noted  Edema: none    Estimated Nutritional Needs  Based on 12/15 weight: 122.7 lb/55.6 kg  Energy Needs (30-35 kcals/kg): 2700-3336  Protein Needs (1.5-1.8 g/kg): 83.4-100.08    Previous Nutrition Diagnosis: Severe-chronic Malnutrition & Underweight  Nutrition Diagnosis is: [x] ongoing - addressed with PO diet, oral nutrition and micronutrient supplementation    New Nutrition Diagnosis: [x] Not applicable    Nutrition Care Plan:  [x] In Progress  [] Achieved  [] Not applicable      Recommendations:      1. Continue Current diet as ordered with Glucerna Shake BID  2. Continue micronutrient supplementation as ordered  3. RD to continue providing Magic Cup and dietary preferences as feasible   4. Continue to provide assistance as necessary with encouragement of PO intake at meals/snacks    Monitoring and Evaluation:   Continue to monitor nutritional intake, tolerance to diet prescription, weights, labs, skin integrity  RD remains available upon request and will follow up per protocol    Wendy Travis MS, RD, CDN, [facility]  pager #[phone]

## 2022-01-11 NOTE — PROGRESS NOTE ADULT - PROBLEM SELECTOR PLAN 6
- chronic and typically hypovolemic due to poor PO intake  - overall improving - chronic and typically hypovolemic due to poor PO intake  - overall stable

## 2022-01-11 NOTE — PROGRESS NOTE ADULT - SUBJECTIVE AND OBJECTIVE BOX
Subjective: Patient seen and examined resting in bed.     Medications:  acetaminophen     Tablet .. 650 milliGRAM(s) Oral every 6 hours PRN  aspirin  chewable 81 milliGRAM(s) Oral daily  baclofen 5 milliGRAM(s) Oral every 8 hours  chlorhexidine 2% Cloths 1 Application(s) Topical <User Schedule>  cholecalciferol 1000 Unit(s) Oral daily  dextrose 50% Injectable 50 milliLiter(s) IV Push every 15 minutes  dextrose 50% Injectable 25 milliLiter(s) IV Push every 15 minutes  enoxaparin Injectable 30 milliGRAM(s) SubCutaneous daily  levoFLOXacin  Tablet 500 milliGRAM(s) Oral every 24 hours  magnesium oxide 400 milliGRAM(s) Oral three times a day with meals  methimazole 10 milliGRAM(s) Oral daily  metoclopramide 10 milliGRAM(s) Oral every 8 hours  metoprolol succinate ER 50 milliGRAM(s) Oral daily  mirtazapine 7.5 milliGRAM(s) Oral at bedtime  multivitamin 1 Tablet(s) Oral daily  pantoprazole    Tablet 40 milliGRAM(s) Oral every 12 hours  potassium phosphate / sodium phosphate Tablet (K-PHOS No. 2) 1 Tablet(s) Oral two times a day  sertraline 100 milliGRAM(s) Oral daily  sodium chloride 1 Gram(s) Oral every 12 hours  sodium chloride 0.9% lock flush 3 milliLiter(s) IV Push every 8 hours  spironolactone 25 milliGRAM(s) Oral daily  vancomycin    Solution 125 milliGRAM(s) Oral every 12 hours      Vitals:  Vital Signs Last 24 Hours  T(C): 36.9 (22 @ 04:50), Max: 36.9 (01-10-22 @ 16:30)  HR: 107 (22 @ 04:50) (103 - 110)  BP: 88/67 (22 @ 05:50) (81/61 - 94/72)  RR: 16 (22 @ 04:50) (16 - 17)  SpO2: 97% (22 @ 04:50) (97% - 98%)    Weight in k.2 (01-10 @ 09:54)    I&O's Summary    10 Norman 2022 07:01  -  2022 07:00  --------------------------------------------------------  IN: 240 mL / OUT: 805 mL / NET: -565 mL        Physical Exam  General: No distress. Comfortable.  HEENT: EOM intact.  Neck: Neck supple. JVP not elevated. No masses  Chest: Clear to auscultation bilaterally  CV: +VAD hum  Abdomen: Soft, non-distended, non-tender, +bilary drain   Neurology: Alert and oriented times three.     LVAD Interrogation  VAD TYPE HM 2  Speed 9200  Flow 4.2  Power 5.1  PI  4  Assessment of driveline exit site: driveline exit site c/d/i  Programming changes: no changes made    Labs:                        9.1    7.33  )-----------( 188      ( 2022 06:11 )             28.3         130<L>  |  96  |  17  ----------------------------<  99  4.2   |  23  |  0.81    Ca    9.3      2022 06:11  Phos  3.2       Mg     2.0         TPro  8.7<H>  /  Alb  3.3  /  TBili  0.3  /  DBili  x   /  AST  19  /  ALT  11  /  AlkPhos  135<H>                Lactate Dehydrogenase, Serum: 215 U/L ( @ 06:11)  Lactate Dehydrogenase, Serum: 237 U/L (01-10 @ 06:28)  Lactate Dehydrogenase, Serum: 213 U/L ( @ 06:58)

## 2022-01-11 NOTE — CHART NOTE - NSCHARTNOTEFT_GEN_A_CORE
Internal Medicine PA Note    Medicine PA Episodic Note    Patient is a 66y old  Male who presents with a chief complaint of COVID (+) with AMS and hypotension (11 Jan 2022 07:33)  Patient seen and assessed for hypotension 85/64 MAP 71. Of note patient with LVAD. Patient asymptomatic. Patient denies weakness/ chest pain/abdominal pain/dyspnea/headache/blurry vision/double vision.     Vital Signs Last 24 Hrs  T(C): 36.9 (11 Jan 2022 04:50), Max: 36.9 (10 Norman 2022 16:30)  T(F): 98.4 (11 Jan 2022 04:50), Max: 98.4 (10 Norman 2022 16:30)  HR: 107 (11 Jan 2022 04:50) (103 - 110)  BP: 88/67 (11 Jan 2022 05:50) (85/64 - 94/72)  BP(mean): 74 (11 Jan 2022 05:50) (71 - 74)  RR: 16 (11 Jan 2022 04:50) (16 - 16)  SpO2: 97% (11 Jan 2022 04:50) (97% - 98%)      Labs:                          9.1    7.33  )-----------( 188      ( 11 Jan 2022 06:11 )             28.3     01-11    130<L>  |  96  |  17  ----------------------------<  99  4.2   |  23  |  0.81    Ca    9.3      11 Jan 2022 06:11  Phos  3.2     01-11  Mg     2.0     01-11    TPro  8.7<H>  /  Alb  3.3  /  TBili  0.3  /  DBili  x   /  AST  19  /  ALT  11  /  AlkPhos  135<H>  01-11        Radiology:    Physical Exam:  General: WN/WD NAD  Neurology: Comfortable, answering questions appropriately, nonfocal, BLANDON x 4  Head:  Normocephalic, atraumatic  Respiratory: CTA B/L  CV: Tachycardic S1S2, no murmur  Abdominal: Soft, NT, ND no palpable mass, (+) biliary drain.   MSK: No edema, + peripheral pulses, FROM all 4 extremity    Assessment & Plan:  HPI:  65M PMH ACC/AHA stage D HF due to NICM HM2 LVAD , TV annuloplasty ring 9/12/17 as destination therapy due to severe peripheral artery disease with significant stenosis  SIADH, Depression, CKD-3 with hyperkalemia, past E. coli UTIs, driveline drainage (1/7/21) and COVID-19 (back in April 2020). Recurrent syncope post LVAD s/p ILR, and chronic abdominal pain and was noted to have a prolong hospital course (6/14-11/16). During that time he has multiple infections and now remains on suppressive antibiotics. Underwent SMA stent with Vascular in 10/2021, now tolerating PO diet, perc dawn drain placement and mutiple GI bleeds. Ultimately was trach and discharged to rehab to get stronger. Patient returned from rehab for trach decannulation, which was removed on 12/2.    Now presented from [facility] on 12/13 with AMS, hypotensive, tachycardic found to be COVID (+). Pt is lethargic and mumbling words.      (13 Dec 2021 01:06)      Plan:  #Asymptomatic Hypotension  - 85/64 MAP 71.  - F/u repeat BP in 1 hour  - Asymptomatic, no acute distress/complaints.   - Reschedule BP meds  - HF following  - Will continue to monitor closely for any associated s/s of hemodynamics instability  - Plan d/w RN    Endorse to AM team.   Follow up with Attending in AM.  Stefano TAYLOR  Dept of Medicine Internal Medicine PA Note    Medicine PA Episodic Note    Patient is a 66y old  Male who presents with a chief complaint of COVID (+) with AMS and hypotension (11 Jan 2022 07:33)  Patient seen and assessed for hypotension 85/64 MAP 71. Patient with soft BP through admission. . Of note patient with LVAD. Patient asymptomatic. Patient denies weakness/ chest pain/abdominal pain/dyspnea/headache/blurry vision/double vision.     Vital Signs Last 24 Hrs  T(C): 36.9 (11 Jan 2022 04:50), Max: 36.9 (10 Norman 2022 16:30)  T(F): 98.4 (11 Jan 2022 04:50), Max: 98.4 (10 Norman 2022 16:30)  HR: 107 (11 Jan 2022 04:50) (103 - 110)  BP: 88/67 (11 Jan 2022 05:50) (85/64 - 94/72)  BP(mean): 74 (11 Jan 2022 05:50) (71 - 74)  RR: 16 (11 Jan 2022 04:50) (16 - 16)  SpO2: 97% (11 Jan 2022 04:50) (97% - 98%)      Labs:                          9.1    7.33  )-----------( 188      ( 11 Jan 2022 06:11 )             28.3     01-11    130<L>  |  96  |  17  ----------------------------<  99  4.2   |  23  |  0.81    Ca    9.3      11 Jan 2022 06:11  Phos  3.2     01-11  Mg     2.0     01-11    TPro  8.7<H>  /  Alb  3.3  /  TBili  0.3  /  DBili  x   /  AST  19  /  ALT  11  /  AlkPhos  135<H>  01-11        Radiology:    Physical Exam:  General: WN/WD NAD  Neurology: PERRLA, Comfortable, answering questions appropriately, nonfocal, BLANDON x 4  Head:  Normocephalic, atraumatic  Respiratory: CTA B/L  CV: Tachycardic S1S2, no murmur  Abdominal: Soft, NT, ND no palpable mass, (+) biliary drain.   MSK: No edema, + peripheral pulses, FROM all 4 extremity    Assessment & Plan:  HPI:  65M PMH ACC/AHA stage D HF due to NICM HM2 LVAD , TV annuloplasty ring 9/12/17 as destination therapy due to severe peripheral artery disease with significant stenosis  SIADH, Depression, CKD-3 with hyperkalemia, past E. coli UTIs, driveline drainage (1/7/21) and COVID-19 (back in April 2020). Recurrent syncope post LVAD s/p ILR, and chronic abdominal pain and was noted to have a prolong hospital course (6/14-11/16). During that time he has multiple infections and now remains on suppressive antibiotics. Underwent SMA stent with Vascular in 10/2021, now tolerating PO diet, perc dawn drain placement and mutiple GI bleeds. Ultimately was trach and discharged to rehab to get stronger. Patient returned from rehab for trach decannulation, which was removed on 12/2.    Now presented from [facility] on 12/13 with AMS, hypotensive, tachycardic found to be COVID (+). Pt is lethargic and mumbling words.      (13 Dec 2021 01:06)      Plan:  #Asymptomatic Hypotension  - 85/64 MAP 71.  - F/u repeat BP in 1 hour  - Asymptomatic, no acute distress/complaints.   - Reschedule BP meds  - Defer IVD at this time,  given patient's cardiac history - will consider if patient symptomatic.   - HF following  - Will continue to monitor closely for any associated s/s of hemodynamics instability  - Plan d/w RN    Endorse to AM team.   Follow up with Attending in AM.  Stefano TAYLOR  Dept of Medicine

## 2022-01-11 NOTE — PROGRESS NOTE ADULT - PROBLEM SELECTOR PLAN 4
- continue with Toprol XL to 50 mg daily (hold for MAP less than 72)  - continue spironolactone 25 mg daily  - Daily PT  - Encourage oral intake and nutrition - continue with Toprol XL to 50 mg daily (hold for MAP less than 70)  - continue spironolactone 25 mg daily  - Daily PT  - Encourage oral intake and nutrition

## 2022-01-12 NOTE — PROCEDURE NOTE - ADDITIONAL PROCEDURE DETAILS
Indication for Interrogation: NSVT on Tele  Presenting Rhythm: V-Sensed 98bpm  Measured Data: R Wave 2.01 mV, sensing WNL  Events since Last Interrogation (12/10/2021): No recorded events  Changes made per EP Celeste: Tachy detection changed from >167bpm to >150bpm  Discussed results with HF team.       LORRIE Ambrocio PA-C  [phone]

## 2022-01-12 NOTE — PROGRESS NOTE ADULT - ASSESSMENT
66yo man with a history of VAD palcement, history of COVID infeciton in April 2020, history of a prolonged hospitalization in 2021 with recurrent sepsis, pneumonia, intubated/trach x2 cycles , chronic gall bladder disease s/p perc dawn, SMA ischemia reuiring a stent placement, cachecxia who was discharged to rehab but is presented from [facility] on 12/13 with AMS, hypotensive, tachycardic found to be COVID (+). Pt is lethargic and mumbling words required ICU admission and pressor support.    More awake, alert, interactive   oxygen nasal requirements with reasonable saturation    COVID infection-   confirmed second bout of COVID by documented PCR testing  Ideally, would be interested in sequencing the virus to determine strain (omicron vs delta)  Received a dose of monoclonal antibodies Regeneron product   Completed remdesivir therapy  Completed ten days of dexamethasone  Refuses most vaccines- but will need to wait three months  to receive COVID vaccine series post monoclonal  Will administer flu/pneumonia vaccines this admission prior to discharge if he will permit        Serratia Bacteremia:  History of recurrent pneumonia and bacteremias   With stenotrophomonas in prior sputum cultures and enterobacter and serratia in blood in October 2021  Blood cultures sent 12/13 growing serratia-    Cefepime to 2 gram IV q 8hr as strain is sensitive to Cefepime- completed treatment course   suppression with oral bactrim started and patient developed a fever ? bactrim related   currently receiving oral levaquin suppression    Source of serratia not entirely clear- differential GI translocation, biliary tree, lungs, other including LVAD      Prior severe C.diff infection   pre-emptive oral Vanco 125mg bid      Morris Prater MD  [phone]  After 5pm/weekends [phone]   64yo man with a history of VAD palcement, history of COVID infeciton in April 2020, history of a prolonged hospitalization in 2021 with recurrent sepsis, pneumonia, intubated/trach x2 cycles , chronic gall bladder disease s/p perc dawn, SMA ischemia reuiring a stent placement, cachecxia who was discharged to rehab but is presented from [facility] on 12/13 with AMS, hypotensive, tachycardic found to be COVID (+). Pt is lethargic and mumbling words required ICU admission and pressor support but recovered and is off oxygen on general cardiac floor    Recurrent GNR bacteremia:    Blood cultures sent on 1/11/22 with GNR serratia preliminary organism ID  sensitivity pending  most recent prior + blood culture with serratia sensitive strain  Can start Zosyn 3.375gm IV q 8hr  source? VAD vs abdominal source  would rep[eat CT of abdomen and chest  consider TTE to look for vegetations which are less likely with gram negative bacteremia  repeat blood cultures for clearance of infection on 1/14/22    COVID infection-   confirmed second bout of COVID by documented PCR testing  Ideally, would be interested in sequencing the virus to determine strain (omicron vs delta)  Received a dose of monoclonal antibodies Regeneron product   Completed remdesivir therapy  Completed ten days of dexamethasone  Refuses most vaccines- but will need to wait three months  to receive COVID vaccine series post monoclonal  Will administer flu/pneumonia vaccines this admission prior to discharge if he will permit      Source of serratia not entirely clear- differential GI translocation, biliary tree, lungs, other including LVAD      Prior severe C.diff infection   pre-emptive oral Vanco 125mg bid      Morris Prater MD  [phone]  After 5pm/weekends [phone]

## 2022-01-12 NOTE — PROGRESS NOTE ADULT - SUBJECTIVE AND OBJECTIVE BOX
Patient is a 66y old  Male who presents with a chief complaint of COVID (+) with AMS and hypotension (2022 11:53)      SUBJECTIVE / OVERNIGHT EVENTS: appears comfortable, chest pain has resolved     MEDICATIONS  (STANDING):  aspirin  chewable 81 milliGRAM(s) Oral daily  baclofen 5 milliGRAM(s) Oral every 8 hours  chlorhexidine 2% Cloths 1 Application(s) Topical <User Schedule>  cholecalciferol 1000 Unit(s) Oral daily  dextrose 50% Injectable 50 milliLiter(s) IV Push every 15 minutes  dextrose 50% Injectable 25 milliLiter(s) IV Push every 15 minutes  enoxaparin Injectable 30 milliGRAM(s) SubCutaneous daily  levoFLOXacin  Tablet 500 milliGRAM(s) Oral every 24 hours  magnesium oxide 400 milliGRAM(s) Oral three times a day with meals  methimazole 10 milliGRAM(s) Oral daily  metoclopramide 10 milliGRAM(s) Oral every 8 hours  metoprolol succinate ER 50 milliGRAM(s) Oral daily  mirtazapine 7.5 milliGRAM(s) Oral at bedtime  multivitamin 1 Tablet(s) Oral daily  pantoprazole    Tablet 40 milliGRAM(s) Oral every 12 hours  potassium phosphate / sodium phosphate Tablet (K-PHOS No. 2) 1 Tablet(s) Oral two times a day  sertraline 100 milliGRAM(s) Oral daily  sodium chloride 1 Gram(s) Oral every 12 hours  sodium chloride 0.9% lock flush 3 milliLiter(s) IV Push every 8 hours  spironolactone 25 milliGRAM(s) Oral daily  vancomycin    Solution 125 milliGRAM(s) Oral every 12 hours    MEDICATIONS  (PRN):  acetaminophen     Tablet .. 650 milliGRAM(s) Oral every 6 hours PRN Temp greater or equal to 38C (100.4F), Mild Pain (1 - 3), Moderate Pain (4 - 6)        CAPILLARY BLOOD GLUCOSE      POCT Blood Glucose.: 145 mg/dL (2022 21:56)  POCT Blood Glucose.: 83 mg/dL (2022 17:28)    I&O's Summary    2022 07:01  -  2022 07:00  --------------------------------------------------------  IN: 960 mL / OUT: 1525 mL / NET: -565 mL    2022 07:01  -  2022 13:47  --------------------------------------------------------  IN: 480 mL / OUT: 750 mL / NET: -270 mL        PHYSICAL EXAM:  GENERAL: NAD, lying comfortably in bed  HEAD: Atraumatic, normocephalic  EYES: EOMI b/l PERRLA b/l, conjunctiva and sclera clear  NECK: Supple, No JVD, No LAD  RESPIRATORY: Normal respiratory effort; lungs are clear to auscultation bilaterally  CARDIOVASCULAR: Regular rate and rhythm, normal S1 and S2, no murmur/rub/gallop; No lower extremity edema; Peripheral pulses are 2+ bilaterally  ABDOMEN: tender to palpation of RUQ near perc dawn drain which is unchanged from prior, LVAD drive line wihout issue , normoactive bowel sounds, no rebound/guarding; No hepatosplenomegaly  MUSCLOSKELETAL: no clubbing or cyanosis of digits; no joint swelling or tenderness to palpation  NEURO: Non focal   PSYCH: A+O to person, place, and time; affect appropriate    LABS:                        8.8    13.49 )-----------( 151      ( 2022 06:37 )             27.3         127<L>  |  93<L>  |  18  ----------------------------<  77  4.2   |  22  |  0.89    Ca    9.3      2022 06:37  Phos  3.0       Mg     2.0         TPro  8.7<H>  /  Alb  3.3  /  TBili  0.3  /  DBili  x   /  AST  19  /  ALT  11  /  AlkPhos  135<H>            Urinalysis Basic - ( 2022 00:26 )    Color: Yellow / Appearance: Clear / S.021 / pH: x  Gluc: x / Ketone: Negative  / Bili: Negative / Urobili: Negative   Blood: x / Protein: 30 mg/dL / Nitrite: Negative   Leuk Esterase: Negative / RBC: 2 /hpf / WBC 2 /HPF   Sq Epi: x / Non Sq Epi: 1 /hpf / Bacteria: Negative        RADIOLOGY & ADDITIONAL TESTS:    Imaging Personally Reviewed:    Consultant(s) Notes Reviewed:      Care Discussed with Consultants/Other Providers:

## 2022-01-12 NOTE — PROGRESS NOTE ADULT - PROBLEM SELECTOR PLAN 2
History of recurrent pneumonia and bacteremia; With stenotrophomonas in prior sputum cultures and enterobacter and serratia in blood in October 2021  -Blood cultures 12/13 + Serratia  -s/p Cefepime  -repeat bcx NGTD  -pt will continue on lifelong Levaquin 500mg QD for suppression therapy  -PO vanc 125mg BID for C. diff ppx   -no longer candidate for bactrim as not tolerated by pt

## 2022-01-12 NOTE — PROGRESS NOTE ADULT - ATTENDING COMMENTS
Patient with HM2 LVAD admitted with COVID, now recovered, awaiting placement at rehab. Had a fall in the bathroom 1/10/2021. CT scan of head negative. Ongoing headaches, repeat CT without acute changes. Patient also having more VT. EP consulted for medication recommendations.

## 2022-01-12 NOTE — PROVIDER CONTACT NOTE (CRITICAL VALUE NOTIFICATION) - ASSESSMENT
Pt A&Ox4. no complaints of pain or discomfort present at this time.
Patient alert and oriented to baseline. No s/s of distress noted. Afebrile. VSS WDL.

## 2022-01-12 NOTE — PROGRESS NOTE ADULT - SUBJECTIVE AND OBJECTIVE BOX
INFECTIOUS DISEASES FOLLOW UP-- Anitra Prater  [phone]    This is a follow up note for this  66yMale with  Sepsis  non toxic appearing but with recurrent GNR bacteremia        ROS:  CONSTITUTIONAL:  No fever, good appetite  CARDIOVASCULAR:  No chest pain or palpitations  RESPIRATORY:  No dyspnea  GASTROINTESTINAL:  No nausea, vomiting, diarrhea, or abdominal pain  GENITOURINARY:  No dysuria  NEUROLOGIC:  No headache,     Allergies    Amiodarone Hydrochloride (Other (Severe))    Intolerances        ANTIBIOTICS/RELEVANT:  antimicrobials  levoFLOXacin  Tablet 500 milliGRAM(s) Oral every 24 hours  vancomycin    Solution 125 milliGRAM(s) Oral every 12 hours    immunologic:    OTHER:  acetaminophen     Tablet .. 650 milliGRAM(s) Oral every 6 hours PRN  aspirin  chewable 81 milliGRAM(s) Oral daily  baclofen 5 milliGRAM(s) Oral every 8 hours  chlorhexidine 2% Cloths 1 Application(s) Topical <User Schedule>  cholecalciferol 1000 Unit(s) Oral daily  dextrose 50% Injectable 50 milliLiter(s) IV Push every 15 minutes  dextrose 50% Injectable 25 milliLiter(s) IV Push every 15 minutes  enoxaparin Injectable 30 milliGRAM(s) SubCutaneous daily  magnesium oxide 400 milliGRAM(s) Oral three times a day with meals  methimazole 10 milliGRAM(s) Oral daily  metoclopramide 10 milliGRAM(s) Oral every 8 hours  metoprolol succinate ER 25 milliGRAM(s) Oral daily  metoprolol succinate ER 50 milliGRAM(s) Oral at bedtime  mirtazapine 7.5 milliGRAM(s) Oral at bedtime  multivitamin 1 Tablet(s) Oral daily  pantoprazole    Tablet 40 milliGRAM(s) Oral every 12 hours  potassium phosphate / sodium phosphate Tablet (K-PHOS No. 2) 1 Tablet(s) Oral two times a day  sertraline 100 milliGRAM(s) Oral daily  sodium chloride 1 Gram(s) Oral every 12 hours  sodium chloride 0.9% lock flush 3 milliLiter(s) IV Push every 8 hours  spironolactone 25 milliGRAM(s) Oral daily      Objective:  Vital Signs Last 24 Hrs  T(C): 36.3 (2022 12:50), Max: 37.7 (2022 01:47)  T(F): 97.3 (2022 12:50), Max: 99.8 (2022 01:47)  HR: 113 (2022 15:27) (95 - 113)  BP: 89/65 (2022 21:40) (89/65 - 89/65)  BP(mean): 74 (2022 12:50) (65 - 75)  RR: 18 (2022 15:27) (18 - 20)  SpO2: 98% (2022 15:27) (97% - 99%)    PHYSICAL EXAM:  Constitutional:no acute distress  Eyes:ALONSO, EOMI  Ear/Nose/Throat: no oral lesions, 	  Respiratory: clear BL  Cardiovascular: S1S2  Gastrointestinal:soft, (+) BS, no tenderness  Extremities:no e/e/c  No Lymphadenopathy  IV sites not inflammed.    LABS:                        8.8    13.49 )-----------( 151      ( 2022 06:37 )             27.3     01-12    127<L>  |  93<L>  |  18  ----------------------------<  77  4.2   |  22  |  0.89    Ca    9.3      2022 06:37  Phos  3.0     01-12  Mg     2.0     -12    TPro  8.7<H>  /  Alb  3.3  /  TBili  0.3  /  DBili  x   /  AST  19  /  ALT  11  /  AlkPhos  135<H>  01-11      Urinalysis Basic - ( 2022 00:26 )    Color: Yellow / Appearance: Clear / S.021 / pH: x  Gluc: x / Ketone: Negative  / Bili: Negative / Urobili: Negative   Blood: x / Protein: 30 mg/dL / Nitrite: Negative   Leuk Esterase: Negative / RBC: 2 /hpf / WBC 2 /HPF   Sq Epi: x / Non Sq Epi: 1 /hpf / Bacteria: Negative        MICROBIOLOGY:  Culture Results:   Growth in aerobic bottle: Gram Negative Rods  ***Blood Panel PCR results on this specimen are available  approximately 3 hours after the Gram stain result.***  Gram stain, PCR, and/or culture results may not always  correspond due to difference in methodologies.  ************************************************************  This PCR assay was performed by multiplex PCR. This  Assay tests for 66 bacterial and resistance gene targets.  Please refer to the [facility] [employer] test directory  at [email or web address] for details. ( @ 23:02)            RECENT CULTURES:   @ 23:02  .Blood Blood-Venous  Blood Culture PCR  Blood Culture PCR  PCR    Growth in aerobic bottle: Gram Negative Rods  ***Blood Panel PCR results on this specimen are available  approximately 3 hours after the Gram stain result.***  Gram stain, PCR, and/or culture results may not always  correspond due to difference in methodologies.  ************************************************************  This PCR assay was performed by multiplex PCR. This  Assay tests for 66 bacterial and resistance gene targets.  Please refer to the [facility] test directory  at [email or web address] for details.  --      RADIOLOGY & ADDITIONAL STUDIES:    < from: CT Head No Cont (22 @ 11:47) >  IMPRESSION:  NO EVIDENCE OF AN ACUTE INTRACRANIAL HEMORRHAGE, MIDLINE SHIFT, OR   HYDROCEPHALUS. ATROPHY WITH PATCHY WHITE MATTER ISCHEMIC CHANGES. NO   SIGNIFICANT INTERVAL JEFF    < end of copied text >   INFECTIOUS DISEASES FOLLOW UP-- Anitra Prater  [phone]    This is a follow up note for this  66yMale with  Sepsis  non toxic appearing but with recurrent GNR bacteremia        ROS:  CONSTITUTIONAL:  No fever,  CARDIOVASCULAR:  No chest pain or palpitations  RESPIRATORY:  No dyspnea  GASTROINTESTINAL:  No nausea, vomiting, diarrhea, mild abdominal pain  GENITOURINARY:  No dysuria  NEUROLOGIC:  No headache,     Allergies    Amiodarone Hydrochloride (Other (Severe))    Intolerances        ANTIBIOTICS/RELEVANT:  antimicrobials  levoFLOXacin  Tablet 500 milliGRAM(s) Oral every 24 hours  vancomycin    Solution 125 milliGRAM(s) Oral every 12 hours    immunologic:    OTHER:  acetaminophen     Tablet .. 650 milliGRAM(s) Oral every 6 hours PRN  aspirin  chewable 81 milliGRAM(s) Oral daily  baclofen 5 milliGRAM(s) Oral every 8 hours  chlorhexidine 2% Cloths 1 Application(s) Topical <User Schedule>  cholecalciferol 1000 Unit(s) Oral daily  dextrose 50% Injectable 50 milliLiter(s) IV Push every 15 minutes  dextrose 50% Injectable 25 milliLiter(s) IV Push every 15 minutes  enoxaparin Injectable 30 milliGRAM(s) SubCutaneous daily  magnesium oxide 400 milliGRAM(s) Oral three times a day with meals  methimazole 10 milliGRAM(s) Oral daily  metoclopramide 10 milliGRAM(s) Oral every 8 hours  metoprolol succinate ER 25 milliGRAM(s) Oral daily  metoprolol succinate ER 50 milliGRAM(s) Oral at bedtime  mirtazapine 7.5 milliGRAM(s) Oral at bedtime  multivitamin 1 Tablet(s) Oral daily  pantoprazole    Tablet 40 milliGRAM(s) Oral every 12 hours  potassium phosphate / sodium phosphate Tablet (K-PHOS No. 2) 1 Tablet(s) Oral two times a day  sertraline 100 milliGRAM(s) Oral daily  sodium chloride 1 Gram(s) Oral every 12 hours  sodium chloride 0.9% lock flush 3 milliLiter(s) IV Push every 8 hours  spironolactone 25 milliGRAM(s) Oral daily      Objective:  Vital Signs Last 24 Hrs  T(C): 36.3 (2022 12:50), Max: 37.7 (2022 01:47)  T(F): 97.3 (2022 12:50), Max: 99.8 (2022 01:47)  HR: 113 (2022 15:27) (95 - 113)  BP: 89/65 (2022 21:40) (89/65 - 89/65)  BP(mean): 74 (2022 12:50) (65 - 75)  RR: 18 (2022 15:27) (18 - 20)  SpO2: 98% (2022 15:27) (97% - 99%)    PHYSICAL EXAM:  Constitutional:no acute distress  Eyes:ALONSO, EOMI  Ear/Nose/Throat: no oral lesions, 	  Respiratory: clear BL  Cardiovascular: S1S2  Gastrointestinal:soft, (+) BS, thin minimal tenderness  RUQ cholecystotomy tube with bilious fluid  driveline site CDI  Extremities:no e/e/c  No Lymphadenopathy  IV sites not inflammed.    LABS:                        8.8    13.49 )-----------( 151      ( 2022 06:37 )             27.3     01-12    127<L>  |  93<L>  |  18  ----------------------------<  77  4.2   |  22  |  0.89    Ca    9.3      2022 06:37  Phos  3.0     12  Mg     2.0     12    TPro  8.7<H>  /  Alb  3.3  /  TBili  0.3  /  DBili  x   /  AST  19  /  ALT  11  /  AlkPhos  135<H>  01-11      Urinalysis Basic - ( 2022 00:26 )    Color: Yellow / Appearance: Clear / S.021 / pH: x  Gluc: x / Ketone: Negative  / Bili: Negative / Urobili: Negative   Blood: x / Protein: 30 mg/dL / Nitrite: Negative   Leuk Esterase: Negative / RBC: 2 /hpf / WBC 2 /HPF   Sq Epi: x / Non Sq Epi: 1 /hpf / Bacteria: Negative        MICROBIOLOGY:  Culture Results:   Growth in aerobic bottle: Gram Negative Rods  ***Blood Panel PCR results on this specimen are available  approximately 3 hours after the Gram stain result.***  Gram stain, PCR, and/or culture results may not always  correspond due to difference in methodologies.  ************************************************************  This PCR assay was performed by multiplex PCR. This  Assay tests for 66 bacterial and resistance gene targets.  Please refer to the [facility] [employer] test directory  at [email or web address] for details. ( @ 23:02)            RECENT CULTURES:   @ 23:02  .Blood Blood-Venous  Blood Culture PCR  Blood Culture PCR  PCR    Growth in aerobic bottle: Gram Negative Rods  ***Blood Panel PCR results on this specimen are available  approximately 3 hours after the Gram stain result.***  Gram stain, PCR, and/or culture results may not always  correspond due to difference in methodologies.  ************************************************************  This PCR assay was performed by multiplex PCR. This  Assay tests for 66 bacterial and resistance gene targets.  Please refer to the [facility] [employer] test directory  at [email or web address] for details.  --      RADIOLOGY & ADDITIONAL STUDIES:    < from: CT Head No Cont (22 @ 11:47) >  IMPRESSION:  NO EVIDENCE OF AN ACUTE INTRACRANIAL HEMORRHAGE, MIDLINE SHIFT, OR   HYDROCEPHALUS. ATROPHY WITH PATCHY WHITE MATTER ISCHEMIC CHANGES. NO   SIGNIFICANT INTERVAL JEFF    < end of copied text >

## 2022-01-12 NOTE — PROGRESS NOTE ADULT - PROBLEM SELECTOR PLAN 4
- continue with Toprol XL to 50 mg daily (hold for MAP less than 70)  - continue spironolactone 25 mg daily  - Daily PT  - Encourage oral intake and nutrition

## 2022-01-12 NOTE — CONSULT NOTE ADULT - ASSESSMENT
65 y/o Male with PMHx of CKD, CAD, TR s/p TV annuloplasty ring (9/2017), PAD, s/p SMA Stent (10/2021), recurrent syncope, s/p ILR (9/2020), hx of GI bleed, hx of multiple systemic infections on suppressive abx, ACC/AHA stage D HF due to NICM s/p HM2 LVAD presents from [facility] on 12/13 with AMS, hypotensive, tachycardic found to be COVID+. Of note, prior prolonged hospital course (6/14-11/16), returned from rehab for trach decannulation and removed on 12/2. During admission having brief episodes of NSVT, 10-60 beats. Previously on Metoprolol Succinate 25 daily and Amiodarone 200mg daily c/b thyrotoxicosis. EP consulted for management.     1. NSVT     65 y/o Male with PMHx of CKD, CAD, TR s/p TV annuloplasty ring (9/2017), PAD, s/p SMA Stent (10/2021), recurrent syncope, s/p ILR (9/2020), hx of GI bleed, hx of multiple systemic infections on suppressive abx, ACC/AHA stage D HF due to NICM s/p HM2 LVAD presents from [facility] on 12/13 with AMS, hypotensive, tachycardic found to be COVID+. Of note, prior prolonged hospital course (6/14-11/16), returned from rehab for trach decannulation and removed on 12/2. During admission having brief episodes of NSVT, 10-60 beats. Previously on Metoprolol Succinate 25 daily and Amiodarone 200mg daily c/b thyrotoxicosis. EP consulted for management.     1. NSVT    - Continue to monitor on tele.   - Loop recorder interrogated, no recorded events. Tele NSVT episodes below detection zone. ILR settings reprogrammed to Tachy >150bpm >16 beats for better arrhythmia tracking.   - Pt currently having infrequent asymptomatic episodes of NSVT with LVAD. Increase Metoprolol Succinate to 75mg daily (25mg in AM and 50mg PM).   - Pt previously on Amiodarone 200mg daily c/b thyrotoxicosis. Would not consider antiarrhythmic at this time.  - Keep K>4.0 and Mg>2.0, replete as necessary.  - Discussed with HF and EP attending. EP to sign off. Reconsult PRN.       LORRIE Ambrocio PA-C  [phone]

## 2022-01-12 NOTE — CHART NOTE - NSCHARTNOTEFT_GEN_A_CORE
Notified by RN that pt had multiple runs of NSVT on Tele with max upto 53 Beats. Pt also with MAP upto 65, repeat MAP was 71. Patient asymptomatic. Patient denies weakness/ chest pain/abdominal pain/dyspnea/headache/blurry vision/double vision.     Vital Signs Last 24 Hrs  T(C): 37.7 (12 Jan 2022 01:47), Max: 37.7 (12 Jan 2022 01:47)  T(F): 99.8 (12 Jan 2022 01:47), Max: 99.8 (12 Jan 2022 01:47)  HR: 110 (12 Jan 2022 01:47) (97 - 110)  BP: 89/65 (11 Jan 2022 21:40) (85/64 - 103/72)  BP(mean): 75 (12 Jan 2022 01:47) (65 - 77)  RR: 20 (12 Jan 2022 01:47) (16 - 20)  SpO2: 98% (12 Jan 2022 01:47) (97% - 100%)                          9.1    7.33  )-----------( 188      ( 11 Jan 2022 06:11 )             28.3     01-11    126<L>  |  93<L>  |  23  ----------------------------<  137<H>  4.2   |  21<L>  |  1.11    Ca    9.1      11 Jan 2022 20:56  Phos  2.7     01-11  Mg     1.7     01-11    TPro  8.7<H>  /  Alb  3.3  /  TBili  0.3  /  DBili  x   /  AST  19  /  ALT  11  /  AlkPhos  135<H>  01-11    Physical Exam.   General: WN/WD NAD  Neurology: PERRLA, Comfortable, answering questions appropriately, nonfocal, BLANDON x 4  Head:  Normocephalic, atraumatic  Respiratory: CTA B/L  CV: Tachycardic S1S2, no murmur  Abdominal: Soft, NT, ND no palpable mass, (+) biliary drain.   MSK: No edema, + peripheral pulses, FROM all 4 extremity    A/P: 65M PMHx VAD placement history of COVID infection in April 2020, history of a prolonged hospitalization in 2021 with recurrent sepsis, pneumonia, intubated/trach x2 cycles , chronic gall bladder disease s/p perc dawn, SMA ischemia requiring a stent placement, cachexia who was discharged to rehab but is presented from [facility] on 12/13 with AMS, hypotensive, tachycardic found to be COVID (+). Pt lethargic and mumbling words required ICU admission and pressor support now titrated off and downgraded to the floor. Awaiting dispo. Likely going home after optimization w/ PT.     # ACC/AHA stage D systolic heart failure.   - NSVT multiple runs on Tele.   - Monitor on Tele.   - Keep K/Mg> 4.0/2.0.   - K/Mg/PO3: 4.2/1.7/2.7, Mag repleted.   - Metoprolol 50mg X1 given [was held in AM due to low MAP].   - hydralazine discontinued by primary team.   -c/w spironolactone 25 mg QD  -c/w metoprolol succinate 50 mg QD  - Cardio Fellow contacted, no need to adjust meds overnight as long as pt asymptomatic.   - f/u HF recommendations in am.       # [phone]  Medicine PA.

## 2022-01-12 NOTE — PROGRESS NOTE ADULT - ASSESSMENT
67 y/o M with a history of Stage D NICM s/p HM2 LVAD in 9/2017 at  (due to severe PAD) with TV ring, multiple GI bleeds, thus maintained off AC, CKD 3prior COVID-19 infection in 4/2020, recurrent syncope post LVAD s/p ILR, s/p prolonged complex hospitalization from 6/14-11/16/2021 initially admitted with abdominal pain complicated by GIB, respiratory failure s/p trach, multiple infections, s/p cholecystotomy tube and SMA stent 10/2021, ultimately discharged to [facility] rehab and then returned to [facility] for trach decannulation 12/2 who now presents with recurrent COVID-19 infection, initially in distributive shock. 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     He had elevated inflammatory markers which are now improving. He received monoclonal antibodies and was started on remdesivir and dexamethasone. He's afebrile with BCx from 12/18 and 12/30 NGTD. He had an episode of hypoxia noted 1/1, possibly in the setting of aspiration. He is currently saturating well on RA. He had runs of MMVT in setting of hypokalemia and being off his beta blocker which has since resolved. He currently appears well compensated from HF perspective. His LDH is stable. His LVAD is functioning well without s/s of pump malfunction.     He is medically ready for discharge, currently awaiting for accepting [facility] facility.

## 2022-01-12 NOTE — CONSULT NOTE ADULT - SUBJECTIVE AND OBJECTIVE BOX
CHIEF COMPLAINT: NSVT    HISTORY OF PRESENT ILLNESS: 65 y/o Male with PMHx of CKD, CAD, TR s/p TV annuloplasty ring (9/2017), PAD, s/p SMA Stent (10/2021), recurrent syncope, s/p ILR (9/2020), hx of GI bleed, hx of multiple systemic infections on suppressive abx, ACC/AHA stage D HF due to NICM s/p HM2 LVAD presents from [facility] on 12/13 with AMS, hypotensive, tachycardic found to be COVID+. Of note, prior prolonged hospital course (6/14-11/16), returned from rehab for trach decannulation and removed on 12/2. During admission having brief episodes of NSVT, 10-60 beats. Previously on Metoprolol Succinate 25 daily and Amiodarone 200mg daily c/b thyrotoxicosis. EP consulted for management.     ALLERGIES:  Amiodarone Hydrochloride - thyrotoxicosis     HOME MEDICATIONS:  baclofen 5 mg oral tablet: 1 tab(s) orally every 8 hours (05 Jan 2022 13:59)  Colace 100 mg oral capsule: 1 cap(s) orally 2 times a day, As Needed (27 Dec 2021 10:58)  finasteride 5 mg oral tablet: 1 tab(s) orally once a day (27 Dec 2021 10:58)  methIMAzole 10 mg oral tablet: 1 tab(s) orally once a day (27 Dec 2021 10:58)  metoprolol succinate 50 mg oral tablet, extended release: 1 tab(s) orally once a day (27 Dec 2021 10:58)  MiraLax oral powder for reconstitution: Mix 1 capfull in 8 oz of water and drink at bedtime as needed for constipation (27 Dec 2021 10:58)  mirtazapine 7.5 mg oral tablet: 1 tab(s) orally once a day (at bedtime) (27 Dec 2021 10:58)  pantoprazole 40 mg oral delayed release tablet: 1 tab(s) orally once a day (27 Dec 2021 10:58)  Senna 8.6 mg oral tablet: 2 tab(s) orally once a day (at bedtime) (27 Dec 2021 10:58)  sertraline 100 mg oral tablet: 1 tab(s) orally once a day (27 Dec 2021 10:58)  spironolactone 25 mg oral tablet: 1 tab(s) orally once a day (27 Dec 2021 10:58)    MEDICATIONS:  aspirin  chewable 81 milliGRAM(s) Oral daily  enoxaparin Injectable 30 milliGRAM(s) SubCutaneous daily  metoprolol succinate ER 50 milliGRAM(s) Oral daily  spironolactone 25 milliGRAM(s) Oral daily  levoFLOXacin  Tablet 500 milliGRAM(s) Oral every 24 hours  vancomycin    Solution 125 milliGRAM(s) Oral every 12 hours  acetaminophen     Tablet .. 650 milliGRAM(s) Oral every 6 hours PRN  baclofen 5 milliGRAM(s) Oral every 8 hours  mirtazapine 7.5 milliGRAM(s) Oral at bedtime  sertraline 100 milliGRAM(s) Oral daily  metoclopramide 10 milliGRAM(s) Oral every 8 hours  pantoprazole    Tablet 40 milliGRAM(s) Oral every 12 hours  dextrose 50% Injectable 50 milliLiter(s) IV Push every 15 minutes  dextrose 50% Injectable 25 milliLiter(s) IV Push every 15 minutes  methimazole 10 milliGRAM(s) Oral daily  chlorhexidine 2% Cloths 1 Application(s) Topical <User Schedule>  cholecalciferol 1000 Unit(s) Oral daily  magnesium oxide 400 milliGRAM(s) Oral three times a day with meals  multivitamin 1 Tablet(s) Oral daily  potassium phosphate / sodium phosphate Tablet (K-PHOS No. 2) 1 Tablet(s) Oral two times a day  sodium chloride 1 Gram(s) Oral every 12 hours  sodium chloride 0.9% lock flush 3 milliLiter(s) IV Push every 8 hours    PAST MEDICAL & SURGICAL HISTORY:  CHF (congestive heart failure)  NICM  CAD (coronary artery disease)  Depression  COVID-19 (4/2020)  PAD  BPH with urinary obstruction  ACC/AHA stage D systolic heart failure  CKD (chronic kidney disease), stage III  Iron deficiency anemia  Vertigo  Recurrent Syncope s/p ILR (9/2020)  Hx of GI bleed  Hx of recurrent infections    S/P TVR ring (9/2017)  S/P LVAD   S/P SMA Stent (10/2021)  H/O tracheostomy    FAMILY HISTORY:  non-contributory    SOCIAL HISTORY:    [X]Non-smoker  [ ] Alcohol - denies    REVIEW OF SYSTEMS:  See HPI. Otherwise, 10 point ROS done and otherwise negative.    PHYSICAL EXAM:  T(C): 36.3 (01-12-22 @ 12:50), Max: 37.7 (01-12-22 @ 01:47)  HR: 99 (01-12-22 @ 12:50) (95 - 110)  BP: 89/65 (01-11-22 @ 21:40) (89/65 - 89/65)  RR: 18 (01-12-22 @ 12:50) (18 - 20)  SpO2: 98% (01-12-22 @ 12:50) (97% - 99%)  Wt(kg): --  I&O's Summary    11 Jan 2022 07:01  -  12 Jan 2022 07:00  --------------------------------------------------------  IN: 960 mL / OUT: 1525 mL / NET: -565 mL    12 Jan 2022 07:01  -  12 Jan 2022 14:45  --------------------------------------------------------  IN: 480 mL / OUT: 750 mL / NET: -270 mL    Appearance: Alert. NAD	  Cardiovascular: +S1S2 RRR, +murmur  Respiratory: CTA B/L	  Psychiatry: A & O x 3, Mood & affect appropriate  Gastrointestinal:  Soft, NT. ND., + BS	  Extremities: No edema BLE  Vascular: Peripheral pulses palpable 2+ bilaterally    LABS:	 	    CBC Full  -  ( 12 Jan 2022 06:37 )  WBC Count : 13.49 K/uL  Hemoglobin : 8.8 g/dL  Hematocrit : 27.3 %  Platelet Count - Automated : 151 K/uL  Mean Cell Volume : 90.7 fl  Mean Cell Hemoglobin : 29.2 pg  Mean Cell Hemoglobin Concentration : 32.2 gm/dL  Auto Neutrophil # : x  Auto Lymphocyte # : x  Auto Monocyte # : x  Auto Eosinophil # : x  Auto Basophil # : x  Auto Neutrophil % : x  Auto Lymphocyte % : x  Auto Monocyte % : x  Auto Eosinophil % : x  Auto Basophil % : x    01-12    127<L>  |  93<L>  |  18  ----------------------------<  77  4.2   |  22  |  0.89  01-11    126<L>  |  93<L>  |  23  ----------------------------<  137<H>  4.2   |  21<L>  |  1.11    Ca    9.3      12 Jan 2022 06:37  Ca    9.1      11 Jan 2022 20:56  Phos  3.0     01-12  Phos  2.7     01-11  Mg     2.0     01-12  Mg     1.7     01-11    TPro  8.7<H>  /  Alb  3.3  /  TBili  0.3  /  DBili  x   /  AST  19  /  ALT  11  /  AlkPhos  135<H>  01-11   TSH: Thyroid Stimulating Hormone, Serum: 1.45 uIU/mL (12.20.21 @ 12:48)  TELEMETRY: SR 90-100s, episodes of NSVT, longest 25 seconds, rate 150-160s 	      ECG: reviewed with [facility] 	    RADIOLOGY:  ACC: [id number] EXAM:  CT BRAIN                        PROCEDURE DATE:  01/12/2022      INTERPRETATION:  CT HEAD    CLINICAL HISTORY: headache    TECHNIQUE:  Noncontrast CT.  Axial Acquisition.  Sagittal and coronal reformations.    COMPARISON:  Exam is compared to prior study of September 8, 2020    FINDINGS:  *  HEMORRHAGE:  No evidence of intracranial hemorrhage.  *  BRAIN PARENCHYMA:  Mild to moderate atrophy. Mild to moderate patchy   periventricular and subcortical white matter ischemic changes. No mass or   mass effect.  *  VENTRICLES / SHIFT:  No hydrocephalus. No midline shift.  *  EXTRA-AXIAL / BASAL CISTERNS:  No extra-axial mass. Basal cisterns   preserved.  *  CALVARIUM AND EXTRACRANIAL SOFT TISSUES:  No depressed calvarial  fracture.  *  SINUSES, ORBITS, MASTOIDS:  The visualized paranasal sinuses and   mastoid air cells are well aerated.    IMPRESSION:  NO EVIDENCE OF AN ACUTE INTRACRANIAL HEMORRHAGE, MIDLINE SHIFT, OR   HYDROCEPHALUS. ATROPHY WITH PATCHY WHITE MATTER ISCHEMIC CHANGES. NO   SIGNIFICANT INTERVAL CHANGE    --- End of Report ---    ANA LILIA ROB MD; Attending Radiologist  This document has been electronically signed. Jan 12 2022 12:19PM     ECHO:  Patient name: RICKY HAMPTON  YOB: 1955   Age: 66 (M)   MR#: [id number]  Study Date: 12/13/2021  Location: [address] [place], [place] [id number]: [facility]  Study quality: Technically fair  Referring Physician: Cayetano Kaminski MD  Blood Pressure: 83/56 mmHg  Height: 182 cm  Weight: 55 kg  BSA: 1.7 m2  Heart Rate: 88 mmHg  ------------------------------------------------------------------------  PROCEDURE: Transthoracic echocardiogram with 2-D, M-Mode  and complete spectral and color flow Doppler.  INDICATION: Cardiogenic shock (R57.0)  ------------------------------------------------------------------------  Dimensions:    Normal Values:  LA:     3.4    2.0 - 4.0 cm  Ao:     3.4    2.0 - 3.8 cm  SEPTUM: 0.7    0.6 - 1.2 cm  PWT:    0.9  0.6 - 1.1 cm  LVIDd:  5.5    3.0 - 5.6 cm  LVIDs:  4.5    1.8 - 4.0 cm  Derived variables:  LVMI: 92 g/m2  RWT: 0.32  Fractional short: 18 %  EF (Visual Estimate): 10 %  ------------------------------------------------------------------------  Observations:  Mitral Valve: Tethered mitral valve leaflets with normal  opening. Moderate mitral regurgitation.  Aortic Valve/Aorta: The aortic valve opens partially with  every observed beat.  Mild continuious aortic  regurgitation.  Aortic Root: 3.4 cm.  Left Atrium: Normal left atrium.  Left Ventricle: Severe global left ventricular systolic  dysfunction.  Mild diastolic dysfunction (Stage I).  Right Heart: Normal right atrium. Normal right ventricular  size with decreased right ventricular systolic function. An  annuloplasty ring is seen in the tricuspid position. Mild  tricuspid regurgitation. Peak tricuspid valve gradient  equals 5 mm Hg, mean transtricuspid valve gradient equals 2  mm Hg. Gradients measured at a HR of 85bpm.  Normal  pulmonic valve. No pulmonic regurgitation.  Pericardium/Pleura: Normal pericardium with no pericardial  effusion.  Hemodynamic: Estimated right atrial pressure is 8 mm Hg.  Estimated right ventricular systolic pressure equals 35 mm  Hg, assuming right atrial pressure equals 8 mm Hg,  consistent with borderline pulmonary hypertension.  ------------------------------------------------------------------------  Conclusions:  1. Tethered mitral valve leaflets with normal opening.  Moderate mitral regurgitation.  2. The aortic valve opens partially with every observed  beat.  Mild continuious aortic regurgitation.  3. Severe global left ventricular systolic dysfunction.  4. A Heartmate 2 LVAD is present in the left ventricle at  the speed of 9200RPM. Theaortic valve opens partially with  every observed beat. There is mild continuious aortic  regurgitation.  The LVAD cannula is visualized in the apex  with pulsitile velocities less than 1.2m/s. The septum  appears midline.  5. Normal right ventricular size with decreased right  ventricular systolic function.  ------------------------------------------------------------------------  Confirmed on  12/13/2021 - 16:37:31 by Justino Nichols M.D.  ------------------------------------------------------------------------

## 2022-01-12 NOTE — PROGRESS NOTE ADULT - PROBLEM SELECTOR PLAN 4
- no gross evidence of ongoing infection at this time  - patient currently comfortable on RA  - possible that patient may have had aspiration event  - CXR negative  - MBS passed (1/3/22) = pt now on regular diet

## 2022-01-12 NOTE — PROGRESS NOTE ADULT - PROBLEM SELECTOR PLAN 5
- history of thyrotoxicosis with amio, on methimazole  - continue BB as above, last episode of VT was today  - Electrolyte repletion to maintain K 4-4.5 and Mg 2-2.5  - unable to uptitrate BB due to MAPs, and unable to use amio - discussed with EP to see patient today for possible mexiletine

## 2022-01-12 NOTE — PROGRESS NOTE ADULT - PROBLEM SELECTOR PLAN 7
- on 1/10 he fell while on his way to the bathroom  - xray of knee was unremarkable  - ct head 1/10 negative  - now since falling, he has develop a headache that is worsening. please repeat CT head - discussed with ACP today 1/12

## 2022-01-12 NOTE — PROVIDER CONTACT NOTE (CRITICAL VALUE NOTIFICATION) - SITUATION
Blood Cultures from 12/13 prelim result- growth in aerobic bottle- gram negative rods
Gram negative rods in aerobic bottle.

## 2022-01-12 NOTE — PROGRESS NOTE ADULT - PROBLEM SELECTOR PLAN 10
-Diet: Regular  -DVT: lovenox 30mg QD  CT head negative for acute infarct   -Dispo: Pending LVAD accepting facility

## 2022-01-12 NOTE — PROGRESS NOTE ADULT - SUBJECTIVE AND OBJECTIVE BOX
Patient seen and examined at bedside.    Overnight Events: Patient still complaining of headache today, and abdominal pain. Had 25 beats NSVT this morning and 8 beats again later in the AM. Had more last night as well.     Current Meds:  acetaminophen     Tablet .. 650 milliGRAM(s) Oral every 6 hours PRN  aspirin  chewable 81 milliGRAM(s) Oral daily  baclofen 5 milliGRAM(s) Oral every 8 hours  chlorhexidine 2% Cloths 1 Application(s) Topical <User Schedule>  cholecalciferol 1000 Unit(s) Oral daily  dextrose 50% Injectable 50 milliLiter(s) IV Push every 15 minutes  dextrose 50% Injectable 25 milliLiter(s) IV Push every 15 minutes  enoxaparin Injectable 30 milliGRAM(s) SubCutaneous daily  levoFLOXacin  Tablet 500 milliGRAM(s) Oral every 24 hours  magnesium oxide 400 milliGRAM(s) Oral three times a day with meals  methimazole 10 milliGRAM(s) Oral daily  metoclopramide 10 milliGRAM(s) Oral every 8 hours  metoprolol succinate ER 50 milliGRAM(s) Oral daily  mirtazapine 7.5 milliGRAM(s) Oral at bedtime  multivitamin 1 Tablet(s) Oral daily  pantoprazole    Tablet 40 milliGRAM(s) Oral every 12 hours  potassium phosphate / sodium phosphate Tablet (K-PHOS No. 2) 1 Tablet(s) Oral two times a day  sertraline 100 milliGRAM(s) Oral daily  sodium chloride 1 Gram(s) Oral every 12 hours  sodium chloride 0.9% lock flush 3 milliLiter(s) IV Push every 8 hours  spironolactone 25 milliGRAM(s) Oral daily  vancomycin    Solution 125 milliGRAM(s) Oral every 12 hours    Vitals:  T(F): 97.5 (01-12), Max: 99.8 (01-12)  HR: 100 (01-12) (95 - 110)  BP: 89/65 (01-11) (89/65 - 93/69)  RR: 18 (01-12)  SpO2: 97% (01-12)  I&O's Summary    11 Jan 2022 07:01  -  12 Jan 2022 07:00  --------------------------------------------------------  IN: 960 mL / OUT: 1525 mL / NET: -565 mL    Physical Exam:  General: No distress. Comfortable.  HEENT: EOM intact.  Neck: Neck supple. JVP not elevated. No masses  Chest: Clear to auscultation bilaterally  CV: +VAD hum  Abdomen: Soft, non-distended, non-tender, +bilary drain   Neurology: Alert and oriented times three.     LVAD Interrogation  VAD TYPE HM 2  Speed 9200  Flow 4.8  Power 5.4  PI  4.6  Assessment of driveline exit site: driveline exit site c/d/i  Programming changes: no changes made               8.8    13.49 )-----------( 151      ( 12 Jan 2022 06:37 )             27.3     01-12    127<L>  |  93<L>  |  18  ----------------------------<  77  4.2   |  22  |  0.89    Ca    9.3      12 Jan 2022 06:37  Phos  3.0     01-12  Mg     2.0     01-12    TPro  8.7<H>  /  Alb  3.3  /  TBili  0.3  /  DBili  x   /  AST  19  /  ALT  11  /  AlkPhos  135<H>  01-11

## 2022-01-12 NOTE — PROGRESS NOTE ADULT - PROBLEM SELECTOR PLAN 1
- Continue with speed of 9200  - s/p controller change on 12/16   - Daily LDH  - Continue ASA 81 mg daily  - Off coumadin due to persistent recurrent GI bleeding

## 2022-01-13 NOTE — PROGRESS NOTE ADULT - SUBJECTIVE AND OBJECTIVE BOX
Patient is a 66y old  Male who presents with a chief complaint of COVID (+) with AMS and hypotension (2022 07:25)      SUBJECTIVE / OVERNIGHT EVENTS: feels ok, denies cp, sob     MEDICATIONS  (STANDING):  aspirin  chewable 81 milliGRAM(s) Oral daily  baclofen 5 milliGRAM(s) Oral every 8 hours  chlorhexidine 2% Cloths 1 Application(s) Topical <User Schedule>  cholecalciferol 1000 Unit(s) Oral daily  dextrose 50% Injectable 25 milliLiter(s) IV Push every 15 minutes  dextrose 50% Injectable 50 milliLiter(s) IV Push every 15 minutes  enoxaparin Injectable 30 milliGRAM(s) SubCutaneous daily  magnesium oxide 400 milliGRAM(s) Oral three times a day with meals  methimazole 10 milliGRAM(s) Oral daily  metoclopramide 10 milliGRAM(s) Oral every 8 hours  metoprolol succinate ER 50 milliGRAM(s) Oral at bedtime  metoprolol succinate ER 25 milliGRAM(s) Oral daily  mirtazapine 7.5 milliGRAM(s) Oral at bedtime  multivitamin 1 Tablet(s) Oral daily  pantoprazole    Tablet 40 milliGRAM(s) Oral every 12 hours  piperacillin/tazobactam IVPB.. 3.375 Gram(s) IV Intermittent every 8 hours  potassium phosphate / sodium phosphate Tablet (K-PHOS No. 2) 1 Tablet(s) Oral two times a day  sertraline 100 milliGRAM(s) Oral daily  sodium chloride 1 Gram(s) Oral every 12 hours  sodium chloride 0.9% lock flush 3 milliLiter(s) IV Push every 8 hours  spironolactone 25 milliGRAM(s) Oral daily  vancomycin    Solution 125 milliGRAM(s) Oral every 12 hours    MEDICATIONS  (PRN):  acetaminophen     Tablet .. 650 milliGRAM(s) Oral every 6 hours PRN Temp greater or equal to 38C (100.4F), Mild Pain (1 - 3), Moderate Pain (4 - 6)        CAPILLARY BLOOD GLUCOSE        I&O's Summary    2022 07:01  -  2022 07:00  --------------------------------------------------------  IN: 840 mL / OUT: 1190 mL / NET: -350 mL    2022 07:01  -  2022 11:58  --------------------------------------------------------  IN: 360 mL / OUT: 10 mL / NET: 350 mL        PHYSICAL EXAM:  GENERAL: NAD, frail   HEAD:  Atraumatic, Normocephalic  EYES: conjunctiva and sclera clear  NECK:  No JVD  CHEST/LUNG: Clear to auscultation bilaterally; No wheeze  HEART: Regular rate and rhythm; No murmurs, rubs, or gallops  ABDOMEN: Soft, Nontender, Nondistended; Bowel sounds present  EXTREMITIES:  2+ Peripheral Pulses, No clubbing, cyanosis, or edema      LABS:                        8.4    12.56 )-----------( 154      ( 2022 06:06 )             25.3     01-13    128<L>  |  94<L>  |  16  ----------------------------<  98  4.0   |  23  |  0.96    Ca    9.2      2022 06:06  Phos  3.0     -  Mg     2.0                 Urinalysis Basic - ( 2022 00:26 )    Color: Yellow / Appearance: Clear / S.021 / pH: x  Gluc: x / Ketone: Negative  / Bili: Negative / Urobili: Negative   Blood: x / Protein: 30 mg/dL / Nitrite: Negative   Leuk Esterase: Negative / RBC: 2 /hpf / WBC 2 /HPF   Sq Epi: x / Non Sq Epi: 1 /hpf / Bacteria: Negative        RADIOLOGY & ADDITIONAL TESTS:    Imaging Personally Reviewed:    Consultant(s) Notes Reviewed:      Care Discussed with Consultants/Other Providers:

## 2022-01-13 NOTE — PROGRESS NOTE ADULT - PROBLEM SELECTOR PLAN 8
- on 1/10 he fell while on his way to the bathroom  - xray of knee was unremarkable  - ct head 1/10 and 1/11 negative

## 2022-01-13 NOTE — CONSULT NOTE ADULT - ASSESSMENT
Assessment:  66y Male with multiple medical hx including CHF s/p LVAD, hx of COVID, s/p perc dawn for cholecystitis, chronic mesenteric ischemia s/p SMA stent placement on 10/18 was on plavix. Prolonged hospital course with bacteremia, COVID (prior), aspiration pneumonitis. Vascular reconsulted as patient has downtrending h/h, aspirin was stopped due to concern of bleeding.    Plan:  - from vascular standpoint, would recommend continuation of plavix unless any concern for ongoing major active bleed  - would recommend getting serial CBC and if stable should restart plavix  - FOBT sent by primary team  - can consider getting CTA to rule out any source of active bleeding  - plan dw Dr Bran    Vascular [id number]

## 2022-01-13 NOTE — PROGRESS NOTE ADULT - PROBLEM SELECTOR PLAN 6
- history of thyrotoxicosis with amio, on methimazole  - continue BB as above, last episode of VT was 1/12  - Electrolyte repletion to maintain K 4-4.5 and Mg 2-2.5  - unable to uptitrate BB due to MAPs, and unable to use amio - EP following

## 2022-01-13 NOTE — PROGRESS NOTE ADULT - SUBJECTIVE AND OBJECTIVE BOX
INFECTIOUS DISEASES FOLLOW UP-- Anitra Prater  [phone]    This is a follow up note for this  66yMale with  Sepsis  recurrent serratia bacteremia        ROS:  CONSTITUTIONAL:  No fever, good appetite  CARDIOVASCULAR:  No chest pain or palpitations  RESPIRATORY:  No dyspnea  GASTROINTESTINAL:  No nausea, vomiting, diarrhea, or abdominal pain  GENITOURINARY:  No dysuria  NEUROLOGIC:  No headache,     Allergies    Amiodarone Hydrochloride (Other (Severe))    Intolerances        ANTIBIOTICS/RELEVANT:  antimicrobials  piperacillin/tazobactam IVPB.. 3.375 Gram(s) IV Intermittent every 8 hours  vancomycin    Solution 125 milliGRAM(s) Oral every 12 hours    immunologic:    OTHER:  acetaminophen     Tablet .. 650 milliGRAM(s) Oral every 6 hours PRN  baclofen 5 milliGRAM(s) Oral every 8 hours  chlorhexidine 2% Cloths 1 Application(s) Topical <User Schedule>  cholecalciferol 1000 Unit(s) Oral daily  dextrose 50% Injectable 25 milliLiter(s) IV Push every 15 minutes  dextrose 50% Injectable 50 milliLiter(s) IV Push every 15 minutes  magnesium oxide 400 milliGRAM(s) Oral three times a day with meals  methimazole 10 milliGRAM(s) Oral daily  metoclopramide 10 milliGRAM(s) Oral every 8 hours  metoprolol succinate ER 25 milliGRAM(s) Oral daily  metoprolol succinate ER 50 milliGRAM(s) Oral at bedtime  mirtazapine 7.5 milliGRAM(s) Oral at bedtime  multivitamin 1 Tablet(s) Oral daily  pantoprazole    Tablet 40 milliGRAM(s) Oral every 12 hours  potassium phosphate / sodium phosphate Tablet (K-PHOS No. 2) 1 Tablet(s) Oral two times a day  sertraline 100 milliGRAM(s) Oral daily  sodium chloride 1 Gram(s) Oral every 12 hours  sodium chloride 0.9% lock flush 3 milliLiter(s) IV Push every 8 hours  spironolactone 25 milliGRAM(s) Oral daily      Objective:  Vital Signs Last 24 Hrs  T(C): 36.7 (2022 20:15), Max: 36.9 (2022 00:10)  T(F): 98.1 (2022 20:15), Max: 98.5 (2022 00:10)  HR: 105 (2022 20:15) (95 - 114)  BP: 77/56 (2022 12:35) (77/56 - 77/56)  BP(mean): 76 (2022 20:15) (63 - 78)  RR: 18 (2022 20:15) (18 - 18)  SpO2: 97% (2022 20:15) (94% - 99%)    PHYSICAL EXAM:  Constitutional:no acute distress  Eyes:ALONSO, EOMI  Ear/Nose/Throat: no oral lesions, 	  Respiratory: clear BL  Cardiovascular: S1S2 VAD sounds  Gastrointestinal:soft, (+) BS, no tenderness bilious drainage in cholecystotomy tube  Extremities:no e/e/c  No Lymphadenopathy  IV sites not inflammed.    LABS:                        8.4    12.56 )-----------( 154      ( 2022 06:06 )             25.3     01-13    128<L>  |  94<L>  |  16  ----------------------------<  98  4.0   |  23  |  0.96    Ca    9.2      2022 06:06  Phos  3.0     01-12  Mg     2.0     01-12        Urinalysis Basic - ( 2022 00:26 )    Color: Yellow / Appearance: Clear / S.021 / pH: x  Gluc: x / Ketone: Negative  / Bili: Negative / Urobili: Negative   Blood: x / Protein: 30 mg/dL / Nitrite: Negative   Leuk Esterase: Negative / RBC: 2 /hpf / WBC 2 /HPF   Sq Epi: x / Non Sq Epi: 1 /hpf / Bacteria: Negative        MICROBIOLOGY:            RECENT CULTURES:   @ 23:02  .Blood Blood-Venous  Blood Culture PCR  Blood Culture PCR  PCR    Growth in aerobic and anaerobic bottles: Serratia marcescens  Susceptibility to follow.  ***Blood Panel PCR results on this specimen are available  approximately 3 hours after the Gram stain result.***  Gram stain, PCR, and/or culture results may not always  correspond due to difference in methodologies.  ************************************************************  This PCR assay was performed by multiplex PCR. This  Assay tests for 66 bacterial and resistance gene targets.  Please refer to the [facility] test directory  at [email or web address] for details.  --      RADIOLOGY & ADDITIONAL STUDIES:    < from: CT Head No Cont (22 @ 11:47) >    IMPRESSION:  NO EVIDENCE OF AN ACUTE INTRACRANIAL HEMORRHAGE, MIDLINE SHIFT, OR   HYDROCEPHALUS. ATROPHY WITH PATCHY WHITE MATTER ISCHEMIC CHANGES. NO   SIGNIFICANT INTERVAL CHANGE    < end of copied text >

## 2022-01-13 NOTE — PROGRESS NOTE ADULT - ASSESSMENT
65M PMHx VAD placement history of COVID infection in April 2020, history of a prolonged hospitalization in 2021 with recurrent sepsis, pneumonia, intubated/trach x2 cycles , chronic gall bladder disease s/p perc dawn, SMA ischemia requiring a stent placement, cachexia who was discharged to rehab but is presented from [facility] on 12/13 with AMS, hypotensive, tachycardic found to be COVID (+). Pt lethargic and mumbling words required ICU admission and pressor support now titrated off and downgraded to the floor. Now with blood cx + for serratia

## 2022-01-13 NOTE — PROGRESS NOTE ADULT - PROBLEM SELECTOR PLAN 5
- continue with Toprol XL to 25mg qAM and 50 mg qPM (hold for MAP less than 70)  - continue spironolactone 25 mg daily  - Daily PT  - Encourage oral intake and nutrition

## 2022-01-13 NOTE — PROGRESS NOTE ADULT - ATTENDING COMMENTS
Patient with HM2 LVAD admitted with COVID, now recovered, awaiting placement at rehab. Had a fall in the bathroom 1/10/2021. CT scan of head x 2 negative.   Worsening VT. Toprol XL dose increased. Appreciate EP input.   Worsening leukocytosis, blood cultures with recurrent Serratia, now back on Zosyn.   H/H downtrending. Check FOBT. Stop ASA and Lovenox. Discussed with vascular due to recent SMA sent 10/2021.

## 2022-01-13 NOTE — PROGRESS NOTE ADULT - ASSESSMENT
65 y/o M with a history of Stage D NICM s/p HM2 LVAD in 9/2017 at  (due to severe PAD) with TV ring, multiple GI bleeds, thus maintained off AC, CKD 3prior COVID-19 infection in 4/2020, recurrent syncope post LVAD s/p ILR, s/p prolonged complex hospitalization from 6/14-11/16/2021 initially admitted with abdominal pain complicated by GIB, respiratory failure s/p trach, multiple infections, s/p cholecystotomy tube and SMA stent 10/2021, ultimately discharged to [facility] rehab and then returned to [facility] for trach decannulation 12/2 who now presents with recurrent COVID-19 infection, initially in distributive shock. 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     He had elevated inflammatory markers which are now improving. He received monoclonal antibodies and was started on remdesivir and dexamethasone. He's afebrile with BCx from 12/18 and 12/30 NGTD. He had an episode of hypoxia noted 1/1, possibly in the setting of aspiration. He is currently saturating well on RA. He had runs of MMVT in setting of hypokalemia and being off his beta blocker which has since resolved. He currently appears well compensated from HF perspective. His LDH is stable. His LVAD is functioning well without s/s of pump malfunction.     Most recently he was noted to have slow uptrend in his WBC and his most recent blood culture is positive again for serratia marcescens. Currently on IV antibiotics. He is also noted to have slow downtrend in his hgb for which fecal occult was ordered. He is now off anticoagulation/ antiplatelets.  CT C/A/P pending.

## 2022-01-13 NOTE — PROGRESS NOTE ADULT - PROBLEM SELECTOR PLAN 3
- has had a slow rise in WBC, currently 12.49  - hx of Serratia bacteremia, his most recent blood culture 1/11 positive for serratia marcescens   - currently on IV zosyn   - ID following  - please send repeat cultures  - CT C/A/P pending

## 2022-01-13 NOTE — PROGRESS NOTE ADULT - SUBJECTIVE AND OBJECTIVE BOX
Subjective: Patient seen and examined resting in bed.     Medications:  acetaminophen     Tablet .. 650 milliGRAM(s) Oral every 6 hours PRN  aspirin  chewable 81 milliGRAM(s) Oral daily  baclofen 5 milliGRAM(s) Oral every 8 hours  chlorhexidine 2% Cloths 1 Application(s) Topical <User Schedule>  cholecalciferol 1000 Unit(s) Oral daily  dextrose 50% Injectable 25 milliLiter(s) IV Push every 15 minutes  dextrose 50% Injectable 50 milliLiter(s) IV Push every 15 minutes  enoxaparin Injectable 30 milliGRAM(s) SubCutaneous daily  magnesium oxide 400 milliGRAM(s) Oral three times a day with meals  methimazole 10 milliGRAM(s) Oral daily  metoclopramide 10 milliGRAM(s) Oral every 8 hours  metoprolol succinate ER 50 milliGRAM(s) Oral at bedtime  metoprolol succinate ER 25 milliGRAM(s) Oral daily  mirtazapine 7.5 milliGRAM(s) Oral at bedtime  multivitamin 1 Tablet(s) Oral daily  pantoprazole    Tablet 40 milliGRAM(s) Oral every 12 hours  piperacillin/tazobactam IVPB.. 3.375 Gram(s) IV Intermittent every 8 hours  potassium phosphate / sodium phosphate Tablet (K-PHOS No. 2) 1 Tablet(s) Oral two times a day  sertraline 100 milliGRAM(s) Oral daily  sodium chloride 1 Gram(s) Oral every 12 hours  sodium chloride 0.9% lock flush 3 milliLiter(s) IV Push every 8 hours  spironolactone 25 milliGRAM(s) Oral daily  vancomycin    Solution 125 milliGRAM(s) Oral every 12 hours      Vitals:  Vital Signs Last 24 Hours  T(C): 36.9 (22 @ 04:15), Max: 37.2 (22 @ 20:20)  HR: 105 (22 @ 04:15) (99 - 114)  BP: --  RR: 18 (22 @ 04:15) (18 - 18)  SpO2: 99% (22 @ 04:15) (97% - 99%)    Weight in k.6 ( @ 09:13)    I&O's Summary    2022 07:01  -  2022 07:00  --------------------------------------------------------  IN: 840 mL / OUT: 1190 mL / NET: -350 mL    2022 07:01  -  2022 07:25  --------------------------------------------------------  IN: 0 mL / OUT: 10 mL / NET: -10 mL        Physical Exam  General: No distress. Comfortable.  HEENT: EOM intact.  Neck: Neck supple. JVP not elevated. No masses  Chest: Clear to auscultation bilaterally  CV: +VAD hum  Abdomen: Soft, non-distended, tender, +bilary drain   Skin: No rashes or skin breakdown  Neurology: Alert and oriented times three.l    LVAD Interrogation  VAD TYPE HM 2  Speed 9200  Flow 5.2  Power 5.4  PI  5.5  Assessment of driveline exit site: driveline dressing c/d/i  Programming changes: no changes made    Labs:                        8.4    12.56 )-----------( 154      ( 2022 06:06 )             25.3         128<L>  |  94<L>  |  16  ----------------------------<  98  4.0   |  23  |  0.96    Ca    9.2      2022 06:06  Phos  3.0       Mg     2.0                     Lactate Dehydrogenase, Serum: 223 U/L ( @ 06:06)  Lactate Dehydrogenase, Serum: 215 U/L ( @ 06:11)

## 2022-01-13 NOTE — PROGRESS NOTE ADULT - PROBLEM SELECTOR PLAN 2
- Dr. Prater, ID following  - COVID PCR negative as of 1/3  - s/p monoclonal antibodies, remdesivir, and dexamethasone  - continue with Vanco PO for C. Diff ppx   - gallbladder US unremarkable

## 2022-01-13 NOTE — CONSULT NOTE ADULT - SUBJECTIVE AND OBJECTIVE BOX
RICKY HAMPTON [id number]  66y Male  31d    HPI:  65M PMH ACC/AHA stage D HF due to NICM HM2 LVAD , TV annuloplasty ring 17 as destination therapy due to severe peripheral artery disease with significant stenosis  SIADH, Depression, CKD-3 with hyperkalemia, past E. coli UTIs, driveline drainage (21) and COVID-19 (back in 2020). Recurrent syncope post LVAD s/p ILR, and chronic abdominal pain and was noted to have a prolong hospital course (-). During that time he has multiple infections and now remains on suppressive antibiotics. Underwent SMA stent with Vascular in 10/2021, now tolerating PO diet, perc dawn drain placement and mutiple GI bleeds. Ultimately was trach and discharged to rehab to get stronger. Patient returned from rehab for trach decannulation, which was removed on .    Now presented from [facility] on  with AMS, hypotensive, tachycardic found to be COVID (+). Pt was lethargic and mumbling words.     Vascular surgery reconsulted as patient had SMA stents placed on 10/18. Now patient with downtrending h/h with concerns for possible GI bleed given history of GI bleeds. FOBT sent and aspirin held.     PAST MEDICAL & SURGICAL HISTORY:  CHF (congestive heart failure)    CAD (coronary artery disease)    Depression    Pleural effusion    History of 2019 novel coronavirus disease (COVID-19)  2020    Hemorrhoids    Bleeding hemorrhoids    Peripheral arterial disease    Claudication    BPH with urinary obstruction    ACC/AHA stage D systolic heart failure    Anticoagulation goal of INR 2.0 to 2.5    Falls    Clavicle fracture    CKD (chronic kidney disease), stage III    Iron deficiency anemia    H/O epistaxis    Vertigo    GI bleed    S/P TVR (tricuspid valve repair)    S/P ventricular assist device    S/P endoscopy    H/O tracheostomy          MEDICATIONS  (STANDING):  baclofen 5 milliGRAM(s) Oral every 8 hours  chlorhexidine 2% Cloths 1 Application(s) Topical <User Schedule>  cholecalciferol 1000 Unit(s) Oral daily  dextrose 50% Injectable 25 milliLiter(s) IV Push every 15 minutes  dextrose 50% Injectable 50 milliLiter(s) IV Push every 15 minutes  magnesium oxide 400 milliGRAM(s) Oral three times a day with meals  methimazole 10 milliGRAM(s) Oral daily  metoclopramide 10 milliGRAM(s) Oral every 8 hours  metoprolol succinate ER 50 milliGRAM(s) Oral at bedtime  metoprolol succinate ER 25 milliGRAM(s) Oral daily  mirtazapine 7.5 milliGRAM(s) Oral at bedtime  multivitamin 1 Tablet(s) Oral daily  pantoprazole    Tablet 40 milliGRAM(s) Oral every 12 hours  piperacillin/tazobactam IVPB.. 3.375 Gram(s) IV Intermittent every 8 hours  potassium phosphate / sodium phosphate Tablet (K-PHOS No. 2) 1 Tablet(s) Oral two times a day  sertraline 100 milliGRAM(s) Oral daily  sodium chloride 1 Gram(s) Oral every 12 hours  sodium chloride 0.9% lock flush 3 milliLiter(s) IV Push every 8 hours  spironolactone 25 milliGRAM(s) Oral daily  vancomycin    Solution 125 milliGRAM(s) Oral every 12 hours    MEDICATIONS  (PRN):  acetaminophen     Tablet .. 650 milliGRAM(s) Oral every 6 hours PRN Temp greater or equal to 38C (100.4F), Mild Pain (1 - 3), Moderate Pain (4 - 6)      Allergies    Amiodarone Hydrochloride (Other (Severe))    Intolerances        REVIEW OF SYSTEMS    [ x ] A ten-point review of systems was otherwise negative except as noted.  [ ] Due to altered mental status/intubation, subjective information were not able to be obtained from the patient. History was obtained, to the extent possible, from review of the chart and collateral sources of information.    Vital Signs Last 24 Hrs  T(C): 36.9 (2022 12:40), Max: 37.2 (2022 20:20)  T(F): 98.4 (2022 12:40), Max: 98.9 (2022 20:20)  HR: 98 (2022 13:32) (97 - 114)  BP: 77/56 (2022 12:35) (77/56 - 77/56)  BP(mean): 70 (2022 12:40) (63 - 82)  RR: 18 (2022 13:32) (18 - 18)  SpO2: 98% (2022 13:32) (94% - 99%)    PHYSICAL EXAM:  GENERAL: NAD, well-appearing  ABDOMEN: Soft, non tender, perc dawn drain in place with bilious output  GROIN: no ecchymosis or hematoma    LABS:  CAPILLARY BLOOD GLUCOSE                            8.4    12.56 )-----------( 154      ( 2022 06:06 )             25.3         -    128<L>  |  94<L>  |  16  ----------------------------<  98  4.0   |  23  |  0.96      Calcium, Total Serum: 9.2 mg/dL (22 @ 06:06)    Urinalysis Basic - ( 2022 00:26 )    Color: Yellow / Appearance: Clear / S.021 / pH: x  Gluc: x / Ketone: Negative  / Bili: Negative / Urobili: Negative   Blood: x / Protein: 30 mg/dL / Nitrite: Negative   Leuk Esterase: Negative / RBC: 2 /hpf / WBC 2 /HPF   Sq Epi: x / Non Sq Epi: 1 /hpf / Bacteria: Negative    Culture - Blood (collected 2022 23:02)  Source: .Blood Blood-Venous  Gram Stain (2022 17:46):    Growth in aerobic bottle: Gram Negative Rods    Growth in anaerobic bottle: Gram Negative Rods  Preliminary Report (2022 13:32):    Growth in anaerobic bottle: Gram Negative Rods    Growth in aerobic bottle: Serratia marcescens Susceptibility to follow.    ***Blood Panel PCR results on this specimen are available    approximately 3 hours after the Gram stain result.***    Gram stain, PCR, and/or culture results may not always    correspond due to difference in methodologies.    ************************************************************    This PCR assay was performed by multiplex PCR. This    Assay tests for 66 bacterial and resistance gene targets.    Please refer to the [facility] test directory    at [email or web address] for details.  Organism: Blood Culture PCR (2022 15:01)  Organism: Blood Culture PCR (2022 15:01)

## 2022-01-13 NOTE — PROGRESS NOTE ADULT - ASSESSMENT
64yo man with a history of VAD palcement, history of COVID infeciton in April 2020, history of a prolonged hospitalization in 2021 with recurrent sepsis, pneumonia, intubated/trach x2 cycles , chronic gall bladder disease s/p perc dawn, SMA ischemia reuiring a stent placement, cachecxia who was discharged to rehab but is presented from [facility] on 12/13 with AMS, hypotensive, tachycardic found to be COVID (+). Pt is lethargic and mumbling words required ICU admission and pressor support but recovered and is off oxygen on general cardiac floor    Recurrent GNR bacteremia:    Blood cultures sent on 1/11/22 with GNR serratia preliminary organism ID  sensitivity pending  most recent prior + blood culture with serratia sensitive strain  Can start Zosyn 3.375gm IV q 8hr  source? VAD vs abdominal source  would rep[eat CT of abdomen and chest  consider TTE to look for vegetations which are less likely with gram negative bacteremia  repeat blood cultures for clearance of infection on 1/14/22    COVID infection-   confirmed second bout of COVID by documented PCR testing  Ideally, would be interested in sequencing the virus to determine strain (omicron vs delta)  Received a dose of monoclonal antibodies Regener[employer] product   Completed remdesivir therapy  Completed ten days of dexamethasone  Refuses most vaccines- but will need to wait three months  to receive COVID vaccine series post monoclonal  Will administer flu/pneumonia vaccines this admission prior to discharge if he will permit      Source of serratia not entirely clear- differential GI translocation, biliary tree, lungs, other including LVAD      Prior severe C.diff infection   pre-emptive oral Vanco 125mg bid      Morris Prater MD  [phone]  After 5pm/weekends [phone]

## 2022-01-13 NOTE — PROGRESS NOTE ADULT - PROBLEM SELECTOR PLAN 1
History of recurrent pneumonia and bacteremia; With stenotrophomonas in prior sputum cultures and enterobacter and serratia in blood in October 2021  -Blood cultures 12/13 + Serratia  -s/p Cefepime  -repeat bcx NGTD  -pt will continue on lifelong Levaquin 500mg QD for suppression therapy  -PO vanc 125mg BID for C. diff ppx  -no longer candidate for bactrim as not tolerated by pt  -blood cx 1/12 + serratia, resumed IV zosyn 1/12--, follow up repeat blood cx, follow up CT C/A/P, f/u echo, trend wbc and temp curve

## 2022-01-13 NOTE — PROGRESS NOTE ADULT - PROBLEM SELECTOR PLAN 1
- Continue with speed of 9200  - s/p controller change on 12/16   - Daily LDH  - due to drop in Hgb ASA 81 mg was d/c today, will resume when feasible   - due to hx of  bleeding, please send fecal occult today  - Off coumadin due to persistent recurrent GI bleeding

## 2022-01-14 NOTE — CONSULT NOTE ADULT - ASSESSMENT
65 y/o Male with PMHx of CKD, CAD, TR s/p TV annuloplasty ring (9/2017), PAD, s/p SMA Stent (10/2021), recurrent syncope, s/p ILR (9/2020), hx of GI bleed, hx of multiple systemic infections on suppressive abx, ACC/AHA stage D HF due to NICM s/p HM2 LVAD presents from [facility] on 12/13 with AMS, hypotensive, tachycardic found to be COVID+. Of note, prior prolonged hospital course (6/14-11/16), returned from rehab for trach decannulation and removed on 12/2. During admission having brief episodes of NSVT, 10-60 beats Metoprolol was increased to 75 qd (25 in AM and 50 in PM), then again Previously on Metoprolol Succinate 25 daily and Amiodarone 200mg daily c/b thyrotoxicosis. EP consulted for management.  65 y/o Male with PMHx of CKD, CAD, TR s/p TV annuloplasty ring (9/2017), PAD, s/p SMA Stent (10/2021), recurrent syncope, s/p ILR (9/2020), hx of GI bleed, hx of multiple systemic infections on suppressive abx, ACC/AHA stage D HF due to NICM s/p HM2 LVAD presents from [facility] on 12/13 with AMS, hypotensive, tachycardic found to be COVID+. Of note, prior prolonged hospital course (6/14-11/16), returned from rehab for trach decannulation and removed on 12/2. During admission having brief episodes of NSVT, 10-60 beats Metoprolol was increased to 75 qd (25 in AM and 50 in PM), then again Previously on Metoprolol Succinate 25 daily and Amiodarone 200mg daily c/b thyrotoxicosis. EP consulted for further recommendations  67 y/o Male with PMHx of CKD, CAD, TR s/p TV annuloplasty ring (9/2017), PAD, s/p SMA Stent (10/2021), recurrent syncope, s/p ILR (9/2020), hx of GI bleed, hx of multiple systemic infections on suppressive abx, ACC/AHA stage D HF due to NICM s/p HM2 LVAD presents from [facility] on 12/13 with AMS, hypotensive, tachycardic found to be COVID+. Of note, prior prolonged hospital course (6/14-11/16), returned from rehab for trach decannulation and removed on 12/2. During admission having brief episodes of NSVT, 10-60 beats Metoprolol was increased to 75 qd (25 in AM and 50 in PM), then again Previously on Metoprolol Succinate 25 daily and Amiodarone 200mg daily c/b thyrotoxicosis. EP consulted for further recommendations, overnight patient had VT 42 episodes.     #VT   -Loop recorder interrogated, reprogrammed setting to tachy > 130 bpm, >16 beats for tracking.  -C/w Metoprolol at current dose  -Patient unable to be on Amiodarone due to previous thyrotoxicosis   -Keep Mg >2, K >4  -Can start Mexiletine 150 mg TID     EP to sign off, please consult as needed

## 2022-01-14 NOTE — PROGRESS NOTE ADULT - PROBLEM SELECTOR PLAN 4
-hydralazine discontinued  -c/w spironolactone 25 mg QD  -c/w metoprolol succinate 50 mg Qhs and 25mg qam   -replete Mg to 2-2.5, K 4-4.5  -Daily PT  -Encourage oral intake and nutrition, salt tabs  -EP eval as pt having multiple runs of wide complex tachycardia

## 2022-01-14 NOTE — PROGRESS NOTE ADULT - PROBLEM SELECTOR PLAN 5
- history of thyrotoxicosis with amio, on methimazole  - please reconsult EP today as patient was noted to have multiple runs of VT overnight   - continue BB as above  - unable to uptitrate BB due to MAPs, and unable to use amio   - Electrolyte repletion to maintain K 4-4.5 and Mg 2-2.5

## 2022-01-14 NOTE — PROGRESS NOTE ADULT - ATTENDING COMMENTS
Patient with HM2 LVAD admitted with COVID, now recovered, awaiting placement at rehab. Had a fall in the bathroom 1/10/2021. CT scan of head x 2 negative.   Worsening VT. Toprol XL dose increased. Still having runs of VT. Will discuss with EP.   Worsening leukocytosis, blood cultures with recurrent Serratia, now back on Zosyn and WBC decreasing.   H/H downtrending, ASA and Lovenox stopped (OK with vascular despite recent SMA stent 10/2021). Awaiting FOBT.

## 2022-01-14 NOTE — PROGRESS NOTE ADULT - ASSESSMENT
65M PMHx VAD placement history of COVID infection in April 2020, history of a prolonged hospitalization in 2021 with recurrent sepsis, pneumonia, intubated/trach x2 cycles , chronic gall bladder disease s/p perc dawn, SMA ischemia requiring a stent placement, cachexia who was discharged to rehab but is presented from [facility] on 12/13 with AMS, hypotensive, tachycardic found to be COVID (+). Pt lethargic and mumbling words required ICU admission and pressor support now titrated off and downgraded to the floor. Now with blood cx + for serratia

## 2022-01-14 NOTE — PROGRESS NOTE ADULT - PROBLEM SELECTOR PLAN 9
-Diet: Regular  -DVT: lovenox 30mg QD  CT head negative for acute infarct   -Dispo: Pending LVAD accepting facility when medically optimized

## 2022-01-14 NOTE — CHART NOTE - NSCHARTNOTEFT_GEN_A_CORE
NP Episodic    CC: Called by RN, pt with 42 beats of V-tach followed by 18 beats of V-tach on tele.    HPI:  Pt at 20:28 with Vtach on tele, first 42 beats followed by 18 beats of Vtach. Pt with longer episode of V-tach yesterday. Pt seen at bedside, weak and minimally verbal at baseline. Pt unable to verbalize any symptoms. Pt with h/o LVAD.  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 coli UTIs, driveline drainage (1/7/21) and COVID-19 (back in April 2020). Recurrent syncope post LVAD s/p ILR, and chronic abdominal pain and was noted to have a prolong hospital course (6/14-11/16). During that time he has multiple infections and now remains on suppressive antibiotics. Underwent SMA stent with Vascular in 10/2021, now tolerating PO diet, perc dawn drain placement and mutiple GI bleeds. Ultimately was trach and discharged to rehab to get stronger. Patient returned from rehab for trach decannulation, which was removed on 12/2.    Now presented from [facility] on 12/13 with AMS, hypotensive, tachycardic found to be COVID (+). Pt is lethargic and mumbling words.      (13 Dec 2021 01:06)      Vital Signs Last 24 Hrs  T(C): 36.6 (14 Jan 2022 00:15), Max: 36.9 (13 Jan 2022 04:15)  T(F): 97.9 (14 Jan 2022 00:15), Max: 98.5 (13 Jan 2022 04:15)  HR: 110 (14 Jan 2022 00:15) (95 - 110)  BP: 77/56 (13 Jan 2022 12:35) (77/56 - 77/56)  BP(mean): 78 (14 Jan 2022 00:15) (63 - 78)  RR: 18 (14 Jan 2022 00:15) (18 - 18)  SpO2: 98% (14 Jan 2022 00:15) (94% - 99%)    Physical Exam:  General:  non-toxic,  NAD  Neurology: A&Ox3, normal speech, CN II-XII intact, nonfocal, BLANDON x 4  Head:  Normocephalic, atraumatic  Respiratory: CTA B/L  CV: RRR, S1S2, no murmur  Abdominal: Soft, NT, ND no palpable mass  MSK: No edema, + peripheral pulses, FROM all 4 extremity    Labs:                          8.4    12.56 )-----------( 154      ( 13 Jan 2022 06:06 )             25.3     01-13    127<L>  |  94<L>  |  19  ----------------------------<  147<H>  4.2   |  23  |  0.81    Ca    9.3      13 Jan 2022 22:21  Phos  3.2     01-13  Mg     2.0     01-13      Assessment & Plan:  HPI:  65M PMH ACC/AHA stage D HF due to NICM HM2 LVAD , TV annuloplasty ring 9/12/17 as destination therapy due to severe peripheral artery disease with significant stenosis  SIADH, Depression, CKD-3 with hyperkalemia, past E. coli UTIs, driveline drainage (1/7/21) and COVID-19 (back in April 2020). Recurrent syncope post LVAD s/p ILR, and chronic abdominal pain and was noted to have a prolong hospital course (6/14-11/16). During that time he has multiple infections and now remains on suppressive antibiotics. Underwent SMA stent with Vascular in 10/2021, now tolerating PO diet, perc dawn drain placement and mutiple GI bleeds. Ultimately was trach and discharged to rehab to get stronger. Patient returned from rehab for trach decannulation, which was removed on 12/2.    Now presented from [facility] on 12/13 with AMS, hypotensive, tachycardic found to be COVID (+). Pt is lethargic and mumbling words.   Pt now with recurrent episodes of Vtach on tele. Pt was started on additional dose of Toprol at bedtime, instructed RN to give it now at 20: 30, Lytes sent returned within normal limits.  Cardiologist covering at nights, Dr. Asher Chavira notified and reviewed event on tele, no further interventions recommended.  Follow up with Attending in AM.    Vida Tipton, PATIENCE   [id number]

## 2022-01-14 NOTE — PROGRESS NOTE ADULT - ASSESSMENT
67 y/o M with a history of Stage D NICM s/p HM2 LVAD in 9/2017 at  (due to severe PAD) with TV ring, multiple GI bleeds, thus maintained off AC, CKD 3prior COVID-19 infection in 4/2020, recurrent syncope post LVAD s/p ILR, s/p prolonged complex hospitalization from 6/14-11/16/2021 initially admitted with abdominal pain complicated by GIB, respiratory failure s/p trach, multiple infections, s/p cholecystotomy tube and SMA stent 10/2021, ultimately discharged to [facility] rehab and then returned to [facility] for trach decannulation 12/2 who now presents with recurrent COVID-19 infection, initially in distributive shock. 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     He had elevated inflammatory markers which are now improving. He received monoclonal antibodies and was started on remdesivir and dexamethasone. He's afebrile with BCx from 12/18 and 12/30 NGTD. He had an episode of hypoxia noted 1/1, possibly in the setting of aspiration. He is currently saturating well on RA. He had runs of MMVT in setting of hypokalemia and being off his beta blocker which has since resolved. He currently appears well compensated from HF perspective. His LDH is stable. His LVAD is functioning well without s/s of pump malfunction.     Most recently he was noted to have slow uptrend in his WBC and his most recent blood culture is positive again for serratia marcescens. Currently on IV antibiotics. He is also noted to have slow downtrend in his hgb for which fecal occult was ordered. He is now off anticoagulation/ antiplatelets.  CT C/A/P pending.

## 2022-01-14 NOTE — PROGRESS NOTE ADULT - SUBJECTIVE AND OBJECTIVE BOX
Subjective: Patient seen and examined resting in bed.     Medications:  acetaminophen     Tablet .. 650 milliGRAM(s) Oral every 6 hours PRN  baclofen 5 milliGRAM(s) Oral every 8 hours  chlorhexidine 2% Cloths 1 Application(s) Topical <User Schedule>  cholecalciferol 1000 Unit(s) Oral daily  dextrose 50% Injectable 25 milliLiter(s) IV Push every 15 minutes  dextrose 50% Injectable 50 milliLiter(s) IV Push every 15 minutes  magnesium oxide 400 milliGRAM(s) Oral three times a day with meals  methimazole 10 milliGRAM(s) Oral daily  metoclopramide 10 milliGRAM(s) Oral every 8 hours  metoprolol succinate ER 25 milliGRAM(s) Oral daily  metoprolol succinate ER 50 milliGRAM(s) Oral at bedtime  mirtazapine 7.5 milliGRAM(s) Oral at bedtime  multivitamin 1 Tablet(s) Oral daily  pantoprazole    Tablet 40 milliGRAM(s) Oral every 12 hours  piperacillin/tazobactam IVPB.. 3.375 Gram(s) IV Intermittent every 8 hours  potassium phosphate / sodium phosphate Tablet (K-PHOS No. 2) 1 Tablet(s) Oral two times a day  sertraline 100 milliGRAM(s) Oral daily  sodium chloride 1 Gram(s) Oral every 12 hours  sodium chloride 0.9% lock flush 3 milliLiter(s) IV Push every 8 hours  spironolactone 25 milliGRAM(s) Oral daily  vancomycin    Solution 125 milliGRAM(s) Oral every 12 hours    Vitals:  Vital Signs Last 24 Hours  T(C): 36.7 (22 @ 04:25), Max: 36.9 (22 @ 12:40)  HR: 100 (22 @ 04:25) (95 - 110)  BP: 77/56 (22 @ 12:35) (77/56 - 77/56)  RR: 18 (22 @ 04:25) (18 - 18)  SpO2: 98% (22 @ 04:25) (94% - 99%)    Weight in k.5 ( @ 08:46)    I&O's Summary    2022 07:01  -  2022 07:00  --------------------------------------------------------  IN: 960 mL / OUT: 820 mL / NET: 140 mL        Physical Exam  General: No distress. Comfortable.  HEENT: EOM intact.  Neck: Neck supple. JVP not elevated. No masses  Chest: Clear to auscultation bilaterally  CV: +VAD hum  Abdomen: Soft, non-distended, tender in RUQ upon palpation, +biliary drain   Neurology: Alert and oriented     LVAD Interrogation  VAD TYPE HM 2  Speed 9200  Flow 5.1  Power 5.3  PI  5.3  Assessment of driveline exit site: driveline dressing c/d/i  Programming changes: no changes made    Labs:                        8.3    9.75  )-----------( 159      ( 2022 06:36 )             25.7         127<L>  |  94<L>  |  19  ----------------------------<  147<H>  4.2   |  23  |  0.81    Ca    9.3      2022 22:21  Phos  3.2       Mg     2.0                     Lactate Dehydrogenase, Serum: 223 U/L ( @ 06:06)

## 2022-01-14 NOTE — CONSULT NOTE ADULT - SUBJECTIVE AND OBJECTIVE BOX
Shilpa Webber MD  Cardiology Fellow  [phone]  All Cardiology service information can be found 24/7 on [email or web address], password: [email or web address]    Patient seen and evaluated at bedside    Chief Complaint:    HPI:  65M PMH ACC/AHA stage D HF due to NICM HM2 LVAD , TV annuloplasty ring 9/12/17 as destination therapy due to severe peripheral artery disease with significant stenosis  SIADH, Depression, CKD-3 with hyperkalemia, past E. coli UTIs, driveline drainage (1/7/21) and COVID-19 (back in April 2020). Recurrent syncope post LVAD s/p ILR, and chronic abdominal pain and was noted to have a prolong hospital course (6/14-11/16). During that time he has multiple infections and now remains on suppressive antibiotics. Underwent SMA stent with Vascular in 10/2021, now tolerating PO diet, perc dawn drain placement and multiple GI bleeds. Ultimately was trach and discharged to rehab to get stronger. Patient returned from rehab for trach decannulation, which was removed on 12/2.    Now presented from [facility] on 12/13 with AMS, hypotensive, tachycardic found to be COVID (+). Pt is lethargic and mumbling words.      (13 Dec 2021 01:06)    Hospital Course:   The patient was placed in the ICU requiring O2 support, started on dexamethasone, remdesivir, IV pressors. Course C/b by GNR bacteremia found to have serratia. He was then treated for with cefepime. Patient was stable for discharge but then had recurrent fevers and elevated HR 12/30 (thought to be aspiration).  Had repeat Hypoxia early Jan, seen by SLP with recs for VFSS/MBS placed on dysphagia diet. Passed MBS. Has to be on lifelong levaquin therapy for suppression for recurrent bacteremia. Patient also has had falls, CT 1/10 and 1/11 negative.  He again had recurrent serratia now restarted on zosyn.     EP patient had VT 12/20 which improved with BB. Had repeat WCT again 12/28.     EP reconsulted for WCT seen on tele 01/11 had WCT 31 beats. Seen 1/12 so he was placed on metoprolol was on 50 then increased to 25 mg in AM and 50 in PM.     He had a hemoglobin drop and is off AC, vascular following. Pending CT C/A/P.     PMHx:   No pertinent past medical history    CHF (congestive heart failure)    CAD (coronary artery disease)    Depression    Pleural effusion    History of 2019 novel coronavirus disease (COVID-19)    Hemorrhoids    Bleeding hemorrhoids    Peripheral arterial disease    Claudication    BPH with urinary obstruction    ACC/AHA stage D systolic heart failure    Anticoagulation goal of INR 2.0 to 2.5    Falls    Clavicle fracture    CKD (chronic kidney disease), stage III    Iron deficiency anemia    H/O epistaxis    Vertigo    GI bleed        PSHx:   No significant past surgical history    S/P TVR (tricuspid valve repair)    S/P ventricular assist device    S/P endoscopy    H/O tracheostomy        Allergies:  Amiodarone Hydrochloride (Other (Severe))      Home Meds:  baclofen 5 mg oral tablet: 1 tab(s) orally every 8 hours (05 Jan 2022 13:59)  Colace 100 mg oral capsule: 1 cap(s) orally 2 times a day, As Needed (27 Dec 2021 10:58)  finasteride 5 mg oral tablet: 1 tab(s) orally once a day (27 Dec 2021 10:58)  methIMAzole 10 mg oral tablet: 1 tab(s) orally once a day (27 Dec 2021 10:58)  metoprolol succinate 50 mg oral tablet, extended release: 1 tab(s) orally once a day (27 Dec 2021 10:58)  MiraLax oral powder for reconstitution: Mix 1 capfull in 8 oz of water and drink at bedtime as needed for constipation (27 Dec 2021 10:58)  mirtazapine 7.5 mg oral tablet: 1 tab(s) orally once a day (at bedtime) (27 Dec 2021 10:58)  pantoprazole 40 mg oral delayed release tablet: 1 tab(s) orally once a day (27 Dec 2021 10:58)  Senna 8.6 mg oral tablet: 2 tab(s) orally once a day (at bedtime) (27 Dec 2021 10:58)  sertraline 100 mg oral tablet: 1 tab(s) orally once a day (27 Dec 2021 10:58)  spironolactone 25 mg oral tablet: 1 tab(s) orally once a day (27 Dec 2021 10:58)      Current Medications:   acetaminophen     Tablet .. 650 milliGRAM(s) Oral every 6 hours PRN  baclofen 5 milliGRAM(s) Oral every 8 hours  chlorhexidine 2% Cloths 1 Application(s) Topical <User Schedule>  cholecalciferol 1000 Unit(s) Oral daily  dextrose 50% Injectable 25 milliLiter(s) IV Push every 15 minutes  dextrose 50% Injectable 50 milliLiter(s) IV Push every 15 minutes  magnesium oxide 400 milliGRAM(s) Oral three times a day with meals  methimazole 10 milliGRAM(s) Oral daily  metoclopramide 10 milliGRAM(s) Oral every 8 hours  metoprolol succinate ER 25 milliGRAM(s) Oral daily  metoprolol succinate ER 50 milliGRAM(s) Oral at bedtime  mirtazapine 7.5 milliGRAM(s) Oral at bedtime  multivitamin 1 Tablet(s) Oral daily  pantoprazole    Tablet 40 milliGRAM(s) Oral every 12 hours  piperacillin/tazobactam IVPB.. 3.375 Gram(s) IV Intermittent every 8 hours  potassium phosphate / sodium phosphate Tablet (K-PHOS No. 2) 1 Tablet(s) Oral two times a day  sertraline 100 milliGRAM(s) Oral daily  sodium chloride 1 Gram(s) Oral every 12 hours  sodium chloride 0.9% lock flush 3 milliLiter(s) IV Push every 8 hours  spironolactone 25 milliGRAM(s) Oral daily  vancomycin    Solution 125 milliGRAM(s) Oral every 12 hours      FAMILY HISTORY:        REVIEW OF SYSTEMS:  CONSTITUTIONAL: No weakness, fevers or chills  EYES/ENT: No visual changes;  No dysphagia  NECK: No pain or stiffness  RESPIRATORY: No cough, wheezing, hemoptysis; No shortness of breath  CARDIOVASCULAR: No chest pain or palpitations; No lower extremity edema  GASTROINTESTINAL: No abdominal or epigastric pain. No nausea, vomiting, or hematemesis; No diarrhea or constipation. No melena or hematochezia.  BACK: No back pain  GENITOURINARY: No dysuria, frequency or hematuria  NEUROLOGICAL: No numbness or weakness  SKIN: No itching, burning, rashes, or lesions   All other review of systems is negative unless indicated above.    Physical Exam:  T(F): 98.5 (01-14), Max: 98.5 (01-14)  HR: 101 (01-14) (95 - 110)  BP: --  RR: 18 (01-14)  SpO2: 97% (01-14)    Cardiovascular Diagnostic Testing:    ECG: Personally reviewed:    Echo:     Patient name: RICKY HAMPTON  YOB: 1955   Age: 66 (M)   MR#: [id number]  Study Date: 1/13/2022  Location: [id number]: Karla Sellers [facility]  Study quality: Technically difficult  Referring Physician: Peace Rodriguez MD  Blood Pressure: 78/66 mmHg  Height: 183 cm  Weight: 54 kg  BSA: 1.7 m2  ------------------------------------------------------------------------  PROCEDURE: Transthoracic echocardiogram with 2-D, M-Mode  and complete spectral and color flow Doppler.  INDICATION: Acute and subacute infective endocarditis  (I33.0)  ------------------------------------------------------------------------  Dimensions:    Normal Values:  LA:     2.8    2.0 - 4.0 cm  Ao:     3.7    2.0 - 3.8 cm  SEPTUM: 0.7    0.6 - 1.2 cm  PWT:    0.9    0.6 - 1.1 cm  LVIDd:  4.6    3.0 - 5.6 cm  LVIDs:  3.2    1.8 - 4.0 cm  Derived variables:  LVMI: 69 g/m2  RWT: 0.39  EF (Visual Estimate):  %  ------------------------------------------------------------------------  Observations:  MitralValve: Normal mitral valve.  Aortic Valve/Aorta: Calcified aortic valve which remains  closed on all observed beats.  Mild, continuous aortic regurgitation.  Normal aortic root size.  Left Atrium: Normal left atrium.  Left Ventricle: Normal left ventricular internal dimensions  and wall thicknesses.  Left ventricular assist device (LVAD) noted in the apex  with laminar flow.  Right Heart: Normal right atrium.  Right ventricular enlargement with decreased right  ventricular systolic function.  An annuloplasty ring is seen in the tricuspid position.  Mild tricuspid regurgitation.  Pericardium/Pleura: Normal pericardium with no pericardial  effusion.  Hemodynamic: No evidence of pulmonary hypertension.  ------------------------------------------------------------------------  Conclusions:  Heartmate 2 at  9200/min.  Normal left ventricular internal dimensions and wall  thicknesses.  Left ventricular assist device (LVAD) noted in the apex  with laminar flow.  Calcified aortic valve which remains closed on all observed  beats. Mild, continuous aortic regurgitation.  Right ventricular enlargement with decreased right  ventricular systolic function.  An annuloplasty ring is seen in the tricuspid position.  Mild tricuspid regurgitation.  ------------------------------------------------------------------------  Confirmed on  1/13/2022 - 13:57:46 by Logan Baeza MD,  ANDRE      Stress Testing:    Cath:  8/2017 [facility]    Imaging:    ACC: [id number] EXAM:  CT BRAIN                        PROCEDURE DATE:  01/12/2022      INTERPRETATION:  CT HEAD    CLINICAL HISTORY: headache    TECHNIQUE:  Noncontrast CT.  Axial Acquisition.  Sagittal and coronal reformations.    COMPARISON:  Exam is compared to prior study of September 8, 2020    FINDINGS:  *  HEMORRHAGE:  No evidence of intracranial hemorrhage.  *  BRAIN PARENCHYMA:  Mild to moderate atrophy. Mild to moderate patchy   periventricular and subcortical white matter ischemic changes. No mass or   mass effect.  *  VENTRICLES / SHIFT:  No hydrocephalus. No midline shift.  *  EXTRA-AXIAL / BASAL CISTERNS:  No extra-axial mass. Basal cisterns   preserved.  *  CALVARIUM AND EXTRACRANIAL SOFT TISSUES:  No depressed calvarial  fracture.  *  SINUSES, ORBITS, MASTOIDS:  The visualized paranasal sinuses and   mastoid air cells are well aerated.    IMPRESSION:  NO EVIDENCE OF AN ACUTE INTRACRANIAL HEMORRHAGE, MIDLINE SHIFT, OR   HYDROCEPHALUS. ATROPHY WITH PATCHY WHITE MATTER ISCHEMIC CHANGES. NO   SIGNIFICANT INTERVAL CHANGE    --- End of Report ---      CXR: Personally reviewed    Labs: Personally reviewed                        8.3    9.75  )-----------( 159      ( 14 Jan 2022 06:36 )             25.7     01-14    129<L>  |  95<L>  |  15  ----------------------------<  93  4.6   |  21<L>  |  0.87    Ca    9.4      14 Jan 2022 06:36  Phos  3.2     01-13  Mg     2.0     01-13                         Shilpa Webber MD  Cardiology Fellow  [phone]  All Cardiology service information can be found 24/7 on [email or web address], password: [email or web address]    Patient seen and evaluated at bedside    Chief Complaint:    HPI:  65M PMH ACC/AHA stage D HF due to NICM HM2 LVAD , TV annuloplasty ring 9/12/17 as destination therapy due to severe peripheral artery disease with significant stenosis  SIADH, Depression, CKD-3 with hyperkalemia, past E. coli UTIs, driveline drainage (1/7/21) and COVID-19 (back in April 2020). Recurrent syncope post LVAD s/p ILR, and chronic abdominal pain and was noted to have a prolong hospital course (6/14-11/16). During that time he has multiple infections and now remains on suppressive antibiotics. Underwent SMA stent with Vascular in 10/2021, now tolerating PO diet, perc dawn drain placement and multiple GI bleeds. Ultimately was trach and discharged to rehab to get stronger. Patient returned from rehab for trach decannulation, which was removed on 12/2.    Now presented from [facility] on 12/13 with AMS, hypotensive, tachycardic found to be COVID (+). Pt is lethargic and mumbling words.      (13 Dec 2021 01:06)    Hospital Course:   The patient was placed in the ICU requiring O2 support, started on dexamethasone, remdesivir, IV pressors. Course C/b by GNR bacteremia found to have serratia. He was then treated for with cefepime. Patient was stable for discharge but then had recurrent fevers and elevated HR 12/30 (thought to be aspiration).  Had repeat Hypoxia early Jan, seen by SLP with recs for VFSS/MBS placed on dysphagia diet. Passed MBS. Has to be on lifelong levaquin therapy for suppression for recurrent bacteremia. Patient also has had falls, CT 1/10 and 1/11 negative.  He again had recurrent serratia now restarted on zosyn. He had a hemoglobin drop and is off AC, vascular following. Pending CT C/A/P.       EP patient had VT 12/20 which improved with BB. Had repeat WCT again 12/28. EP reconsulted for WCT seen on tele 01/11 had WCT 31 beats. Seen 1/12 so he was placed on metoprolol was on 50 then increased to 25 mg in AM and 50 in PM.     PMHx:   No pertinent past medical history    CHF (congestive heart failure)    CAD (coronary artery disease)    Depression    Pleural effusion    History of 2019 novel coronavirus disease (COVID-19)    Hemorrhoids    Bleeding hemorrhoids    Peripheral arterial disease    Claudication    BPH with urinary obstruction    ACC/AHA stage D systolic heart failure    Anticoagulation goal of INR 2.0 to 2.5    Falls    Clavicle fracture    CKD (chronic kidney disease), stage III    Iron deficiency anemia    H/O epistaxis    Vertigo    GI bleed        PSHx:   No significant past surgical history    S/P TVR (tricuspid valve repair)    S/P ventricular assist device    S/P endoscopy    H/O tracheostomy        Allergies:  Amiodarone Hydrochloride (Other (Severe))      Home Meds:  baclofen 5 mg oral tablet: 1 tab(s) orally every 8 hours (05 Jan 2022 13:59)  Colace 100 mg oral capsule: 1 cap(s) orally 2 times a day, As Needed (27 Dec 2021 10:58)  finasteride 5 mg oral tablet: 1 tab(s) orally once a day (27 Dec 2021 10:58)  methIMAzole 10 mg oral tablet: 1 tab(s) orally once a day (27 Dec 2021 10:58)  metoprolol succinate 50 mg oral tablet, extended release: 1 tab(s) orally once a day (27 Dec 2021 10:58)  MiraLax oral powder for reconstitution: Mix 1 capfull in 8 oz of water and drink at bedtime as needed for constipation (27 Dec 2021 10:58)  mirtazapine 7.5 mg oral tablet: 1 tab(s) orally once a day (at bedtime) (27 Dec 2021 10:58)  pantoprazole 40 mg oral delayed release tablet: 1 tab(s) orally once a day (27 Dec 2021 10:58)  Senna 8.6 mg oral tablet: 2 tab(s) orally once a day (at bedtime) (27 Dec 2021 10:58)  sertraline 100 mg oral tablet: 1 tab(s) orally once a day (27 Dec 2021 10:58)  spironolactone 25 mg oral tablet: 1 tab(s) orally once a day (27 Dec 2021 10:58)      Current Medications:   acetaminophen     Tablet .. 650 milliGRAM(s) Oral every 6 hours PRN  baclofen 5 milliGRAM(s) Oral every 8 hours  chlorhexidine 2% Cloths 1 Application(s) Topical <User Schedule>  cholecalciferol 1000 Unit(s) Oral daily  dextrose 50% Injectable 25 milliLiter(s) IV Push every 15 minutes  dextrose 50% Injectable 50 milliLiter(s) IV Push every 15 minutes  magnesium oxide 400 milliGRAM(s) Oral three times a day with meals  methimazole 10 milliGRAM(s) Oral daily  metoclopramide 10 milliGRAM(s) Oral every 8 hours  metoprolol succinate ER 25 milliGRAM(s) Oral daily  metoprolol succinate ER 50 milliGRAM(s) Oral at bedtime  mirtazapine 7.5 milliGRAM(s) Oral at bedtime  multivitamin 1 Tablet(s) Oral daily  pantoprazole    Tablet 40 milliGRAM(s) Oral every 12 hours  piperacillin/tazobactam IVPB.. 3.375 Gram(s) IV Intermittent every 8 hours  potassium phosphate / sodium phosphate Tablet (K-PHOS No. 2) 1 Tablet(s) Oral two times a day  sertraline 100 milliGRAM(s) Oral daily  sodium chloride 1 Gram(s) Oral every 12 hours  sodium chloride 0.9% lock flush 3 milliLiter(s) IV Push every 8 hours  spironolactone 25 milliGRAM(s) Oral daily  vancomycin    Solution 125 milliGRAM(s) Oral every 12 hours        REVIEW OF SYSTEMS:  CONSTITUTIONAL: No weakness, fevers or chills  EYES/ENT: No visual changes;  No dysphagia  NECK: No pain or stiffness  RESPIRATORY: No cough, wheezing, hemoptysis; No shortness of breath  CARDIOVASCULAR: No chest pain or palpitations; No lower extremity edema  GASTROINTESTINAL: No abdominal or epigastric pain. No nausea, vomiting, or hematemesis; No diarrhea or constipation. No melena or hematochezia.  BACK: No back pain  GENITOURINARY: No dysuria, frequency or hematuria  NEUROLOGICAL: No numbness or weakness  SKIN: No itching, burning, rashes, or lesions   All other review of systems is negative unless indicated above.    Physical Exam:  T(F): 98.5 (01-14), Max: 98.5 (01-14)  HR: 101 (01-14) (95 - 110)  BP: --  RR: 18 (01-14)  SpO2: 97% (01-14)    Cardiovascular Diagnostic Testing:    ECG: Personally reviewed:    Echo:     Patient name: RICKY HAMPTON  YOB: 1955   Age: 66 (M)   MR#: [id number]  Study Date: 1/13/2022  [id number]  Study quality: Technically difficult  Referring Physician: Peace Rodriguez MD  Blood Pressure: 78/66 mmHg  Height: 183 cm  Weight: 54 kg  BSA: 1.7 m2  ------------------------------------------------------------------------  PROCEDURE: Transthoracic echocardiogram with 2-D, M-Mode  and complete spectral and color flow Doppler.  INDICATION: Acute and subacute infective endocarditis  (I33.0)  ------------------------------------------------------------------------  Dimensions:    Normal Values:  LA:     2.8    2.0 - 4.0 cm  Ao:     3.7    2.0 - 3.8 cm  SEPTUM: 0.7    0.6 - 1.2 cm  PWT:    0.9    0.6 - 1.1 cm  LVIDd:  4.6    3.0 - 5.6 cm  LVIDs:  3.2    1.8 - 4.0 cm  Derived variables:  LVMI: 69 g/m2  RWT: 0.39  EF (Visual Estimate):  %  ------------------------------------------------------------------------  Observations:  MitralValve: Normal mitral valve.  Aortic Valve/Aorta: Calcified aortic valve which remains  closed on all observed beats.  Mild, continuous aortic regurgitation.  Normal aortic root size.  Left Atrium: Normal left atrium.  Left Ventricle: Normal left ventricular internal dimensions  and wall thicknesses.  Left ventricular assist device (LVAD) noted in the apex  with laminar flow.  Right Heart: Normal right atrium.  Right ventricular enlargement with decreased right  ventricular systolic function.  An annuloplasty ring is seen in the tricuspid position.  Mild tricuspid regurgitation.  Pericardium/Pleura: Normal pericardium with no pericardial  effusion.  Hemodynamic: No evidence of pulmonary hypertension.  ------------------------------------------------------------------------  Conclusions:  Heartmate 2 at  9200/min.  Normal left ventricular internal dimensions and wall  thicknesses.  Left ventricular assist device (LVAD) noted in the apex  with laminar flow.  Calcified aortic valve which remains closed on all observed  beats. Mild, continuous aortic regurgitation.  Right ventricular enlargement with decreased right  ventricular systolic function.  An annuloplasty ring is seen in the tricuspid position.  Mild tricuspid regurgitation.  ------------------------------------------------------------------------  Confirmed on  1/13/2022 - 13:57:46 by Logan Baeza MD, FASE      Stress Testing:    Cath:  8/2017 [facility]    Imaging:    ACC: [id number] EXAM:  CT BRAIN                        PROCEDURE DATE:  01/12/2022      INTERPRETATION:  CT HEAD    CLINICAL HISTORY: headache    TECHNIQUE:  Noncontrast CT.  Axial Acquisition.  Sagittal and coronal reformations.    COMPARISON:  Exam is compared to prior study of September 8, 2020    FINDINGS:  *  HEMORRHAGE:  No evidence of intracranial hemorrhage.  *  BRAIN PARENCHYMA:  Mild to moderate atrophy. Mild to moderate patchy   periventricular and subcortical white matter ischemic changes. No mass or   mass effect.  *  VENTRICLES / SHIFT:  No hydrocephalus. No midline shift.  *  EXTRA-AXIAL / BASAL CISTERNS:  No extra-axial mass. Basal cisterns   preserved.  *  CALVARIUM AND EXTRACRANIAL SOFT TISSUES:  No depressed calvarial  fracture.  *  SINUSES, ORBITS, MASTOIDS:  The visualized paranasal sinuses and   mastoid air cells are well aerated.    IMPRESSION:  NO EVIDENCE OF AN ACUTE INTRACRANIAL HEMORRHAGE, MIDLINE SHIFT, OR   HYDROCEPHALUS. ATROPHY WITH PATCHY WHITE MATTER ISCHEMIC CHANGES. NO   SIGNIFICANT INTERVAL CHANGE    --- End of Report ---      CXR: Personally reviewed    Labs: Personally reviewed                        8.3    9.75  )-----------( 159      ( 14 Jan 2022 06:36 )             25.7     01-14    129<L>  |  95<L>  |  15  ----------------------------<  93  4.6   |  21<L>  |  0.87    Ca    9.4      14 Jan 2022 06:36  Phos  3.2     01-13  Mg     2.0     01-13                         Shilpa Webber MD  Cardiology Fellow  [phone]  All Cardiology service information can be found 24/7 on [email or web address], password: [email or web address]    Patient seen and evaluated at bedside    Chief Complaint:    HPI:  65M PMH ACC/AHA stage D HF due to NICM HM2 LVAD , TV annuloplasty ring 9/12/17 as destination therapy due to severe peripheral artery disease with significant stenosis  SIADH, Depression, CKD-3 with hyperkalemia, past E. coli UTIs, driveline drainage (1/7/21) and COVID-19 (back in April 2020). Recurrent syncope post LVAD s/p ILR, and chronic abdominal pain and was noted to have a prolong hospital course (6/14-11/16). During that time he has multiple infections and now remains on suppressive antibiotics. Underwent SMA stent with Vascular in 10/2021, now tolerating PO diet, perc dawn drain placement and multiple GI bleeds. Ultimately was trach and discharged to rehab to get stronger. Patient returned from rehab for trach decannulation, which was removed on 12/2.    Now presented from [facility] on 12/13 with AMS, hypotensive, tachycardic found to be COVID (+). Pt is lethargic and mumbling words.      (13 Dec 2021 01:06)    Hospital Course:   The patient was placed in the ICU requiring O2 support, started on dexamethasone, remdesivir, IV pressors. Course C/b by GNR bacteremia found to have serratia. He was then treated for with cefepime. Patient was stable for discharge but then had recurrent fevers and elevated HR 12/30 (thought to be aspiration).  Had repeat Hypoxia early Jan, seen by SLP with recs for VFSS/MBS placed on dysphagia diet. Passed MBS. Has to be on lifelong levaquin therapy for suppression for recurrent bacteremia. Patient also has had falls, CT 1/10 and 1/11 negative.  He again had recurrent serratia now restarted on zosyn. He had a hemoglobin drop and is off AC, vascular following. Pending CT C/A/P.       EP patient had VT 12/20 which improved with BB. Had repeat WCT again 12/28. EP reconsulted for WCT seen on tele 01/11 had WCT 31 beats. Seen 1/12 so he was placed on metoprolol was on 50 then increased to 25 mg in AM and 50 in PM.     PMHx:   No pertinent past medical history    CHF (congestive heart failure)    CAD (coronary artery disease)    Depression    Pleural effusion    History of 2019 novel coronavirus disease (COVID-19)    Hemorrhoids    Bleeding hemorrhoids    Peripheral arterial disease    Claudication    BPH with urinary obstruction    ACC/AHA stage D systolic heart failure    Anticoagulation goal of INR 2.0 to 2.5    Falls    Clavicle fracture    CKD (chronic kidney disease), stage III    Iron deficiency anemia    H/O epistaxis    Vertigo    GI bleed        PSHx:   No significant past surgical history    S/P TVR (tricuspid valve repair)    S/P ventricular assist device    S/P endoscopy    H/O tracheostomy        Allergies:  Amiodarone Hydrochloride (Other (Severe))      Home Meds:  baclofen 5 mg oral tablet: 1 tab(s) orally every 8 hours (05 Jan 2022 13:59)  Colace 100 mg oral capsule: 1 cap(s) orally 2 times a day, As Needed (27 Dec 2021 10:58)  finasteride 5 mg oral tablet: 1 tab(s) orally once a day (27 Dec 2021 10:58)  methIMAzole 10 mg oral tablet: 1 tab(s) orally once a day (27 Dec 2021 10:58)  metoprolol succinate 50 mg oral tablet, extended release: 1 tab(s) orally once a day (27 Dec 2021 10:58)  MiraLax oral powder for reconstitution: Mix 1 capfull in 8 oz of water and drink at bedtime as needed for constipation (27 Dec 2021 10:58)  mirtazapine 7.5 mg oral tablet: 1 tab(s) orally once a day (at bedtime) (27 Dec 2021 10:58)  pantoprazole 40 mg oral delayed release tablet: 1 tab(s) orally once a day (27 Dec 2021 10:58)  Senna 8.6 mg oral tablet: 2 tab(s) orally once a day (at bedtime) (27 Dec 2021 10:58)  sertraline 100 mg oral tablet: 1 tab(s) orally once a day (27 Dec 2021 10:58)  spironolactone 25 mg oral tablet: 1 tab(s) orally once a day (27 Dec 2021 10:58)      Current Medications:   acetaminophen     Tablet .. 650 milliGRAM(s) Oral every 6 hours PRN  baclofen 5 milliGRAM(s) Oral every 8 hours  chlorhexidine 2% Cloths 1 Application(s) Topical <User Schedule>  cholecalciferol 1000 Unit(s) Oral daily  dextrose 50% Injectable 25 milliLiter(s) IV Push every 15 minutes  dextrose 50% Injectable 50 milliLiter(s) IV Push every 15 minutes  magnesium oxide 400 milliGRAM(s) Oral three times a day with meals  methimazole 10 milliGRAM(s) Oral daily  metoclopramide 10 milliGRAM(s) Oral every 8 hours  metoprolol succinate ER 25 milliGRAM(s) Oral daily  metoprolol succinate ER 50 milliGRAM(s) Oral at bedtime  mirtazapine 7.5 milliGRAM(s) Oral at bedtime  multivitamin 1 Tablet(s) Oral daily  pantoprazole    Tablet 40 milliGRAM(s) Oral every 12 hours  piperacillin/tazobactam IVPB.. 3.375 Gram(s) IV Intermittent every 8 hours  potassium phosphate / sodium phosphate Tablet (K-PHOS No. 2) 1 Tablet(s) Oral two times a day  sertraline 100 milliGRAM(s) Oral daily  sodium chloride 1 Gram(s) Oral every 12 hours  sodium chloride 0.9% lock flush 3 milliLiter(s) IV Push every 8 hours  spironolactone 25 milliGRAM(s) Oral daily  vancomycin    Solution 125 milliGRAM(s) Oral every 12 hours        REVIEW OF SYSTEMS:  CONSTITUTIONAL: No weakness, fevers or chills  EYES/ENT: No visual changes;  No dysphagia  NECK: No pain or stiffness  RESPIRATORY: No cough, wheezing, hemoptysis; No shortness of breath  CARDIOVASCULAR: No chest pain or palpitations; No lower extremity edema  GASTROINTESTINAL: No abdominal or epigastric pain. No nausea, vomiting, or hematemesis; No diarrhea or constipation. No melena or hematochezia.  BACK: No back pain  GENITOURINARY: No dysuria, frequency or hematuria  NEUROLOGICAL: No numbness or weakness  SKIN: No itching, burning, rashes, or lesions   All other review of systems is negative unless indicated above.    Physical Exam:  T(F): 98.5 (01-14), Max: 98.5 (01-14)  HR: 101 (01-14) (95 - 110)  BP: --  RR: 18 (01-14)  SpO2: 97% (01-14)    Gen: Uncomfortable   Card: Murmur+  Abdomen: Tender in abdomen   Lung: Bibasilar crackles   Ext: No LE edema     Cardiovascular Diagnostic Testing:    ECG: Personally reviewed:    Echo:     Patient name: RICKY HAMPTON  YOB: 1955   Age: 66 (M)   MR#: [id number]  Study Date: 1/13/2022  Location: [address] [place], [place] [id number]: [facility]  Study quality: Technically difficult  Referring Physician: Peace Rodriguez MD  Blood Pressure: 78/66 mmHg  Height: 183 cm  Weight: 54 kg  BSA: 1.7 m2  ------------------------------------------------------------------------  PROCEDURE: Transthoracic echocardiogram with 2-D, M-Mode  and complete spectral and color flow Doppler.  INDICATION: Acute and subacute infective endocarditis  (I33.0)  ------------------------------------------------------------------------  Dimensions:    Normal Values:  LA:     2.8    2.0 - 4.0 cm  Ao:     3.7    2.0 - 3.8 cm  SEPTUM: 0.7    0.6 - 1.2 cm  PWT:    0.9    0.6 - 1.1 cm  LVIDd:  4.6    3.0 - 5.6 cm  LVIDs:  3.2    1.8 - 4.0 cm  Derived variables:  LVMI: 69 g/m2  RWT: 0.39  EF (Visual Estimate):  %  ------------------------------------------------------------------------  Observations:  MitralValve: Normal mitral valve.  Aortic Valve/Aorta: Calcified aortic valve which remains  closed on all observed beats.  Mild, continuous aortic regurgitation.  Normal aortic root size.  Left Atrium: Normal left atrium.  Left Ventricle: Normal left ventricular internal dimensions  and wall thicknesses.  Left ventricular assist device (LVAD) noted in the apex  with laminar flow.  Right Heart: Normal right atrium.  Right ventricular enlargement with decreased right  ventricular systolic function.  An annuloplasty ring is seen in the tricuspid position.  Mild tricuspid regurgitation.  Pericardium/Pleura: Normal pericardium with no pericardial  effusion.  Hemodynamic: No evidence of pulmonary hypertension.  ------------------------------------------------------------------------  Conclusions:  Heartmate 2 at  9200/min.  Normal left ventricular internal dimensions and wall  thicknesses.  Left ventricular assist device (LVAD) noted in the apex  with laminar flow.  Calcified aortic valve which remains closed on all observed  beats. Mild, continuous aortic regurgitation.  Right ventricular enlargement with decreased right  ventricular systolic function.  An annuloplasty ring is seen in the tricuspid position.  Mild tricuspid regurgitation.  ------------------------------------------------------------------------  Confirmed on  1/13/2022 - 13:57:46 by Logan Baeza MD,  ANDRE      Stress Testing:    Cath:  8/2017 [facility]    Imaging:    ACC: [id number] EXAM:  CT BRAIN                        PROCEDURE DATE:  01/12/2022      INTERPRETATION:  CT HEAD    CLINICAL HISTORY: headache    TECHNIQUE:  Noncontrast CT.  Axial Acquisition.  Sagittal and coronal reformations.    COMPARISON:  Exam is compared to prior study of September 8, 2020    FINDINGS:  *  HEMORRHAGE:  No evidence of intracranial hemorrhage.  *  BRAIN PARENCHYMA:  Mild to moderate atrophy. Mild to moderate patchy   periventricular and subcortical white matter ischemic changes. No mass or   mass effect.  *  VENTRICLES / SHIFT:  No hydrocephalus. No midline shift.  *  EXTRA-AXIAL / BASAL CISTERNS:  No extra-axial mass. Basal cisterns   preserved.  *  CALVARIUM AND EXTRACRANIAL SOFT TISSUES:  No depressed calvarial  fracture.  *  SINUSES, ORBITS, MASTOIDS:  The visualized paranasal sinuses and   mastoid air cells are well aerated.    IMPRESSION:  NO EVIDENCE OF AN ACUTE INTRACRANIAL HEMORRHAGE, MIDLINE SHIFT, OR   HYDROCEPHALUS. ATROPHY WITH PATCHY WHITE MATTER ISCHEMIC CHANGES. NO   SIGNIFICANT INTERVAL CHANGE    --- End of Report ---      CXR: Personally reviewed    Labs: Personally reviewed                        8.3    9.75  )-----------( 159      ( 14 Jan 2022 06:36 )             25.7     01-14    129<L>  |  95<L>  |  15  ----------------------------<  93  4.6   |  21<L>  |  0.87    Ca    9.4      14 Jan 2022 06:36  Phos  3.2     01-13  Mg     2.0     01-13                         Shilpa Webber MD  Cardiology Fellow  [phone]  All Cardiology service information can be found 24/7 on [email or web address], password: [email or web address]    Patient seen and evaluated at bedside    Chief Complaint:    HPI:  65M PMH ACC/AHA stage D HF due to NICM HM2 LVAD , TV annuloplasty ring 9/12/17 as destination therapy due to severe peripheral artery disease with significant stenosis  SIADH, Depression, CKD-3 with hyperkalemia, past E. coli UTIs, driveline drainage (1/7/21) and COVID-19 (back in April 2020). Recurrent syncope post LVAD s/p ILR, and chronic abdominal pain and was noted to have a prolong hospital course (6/14-11/16). During that time he has multiple infections and now remains on suppressive antibiotics. Underwent SMA stent with Vascular in 10/2021, now tolerating PO diet, perc dawn drain placement and multiple GI bleeds. Ultimately was trach and discharged to rehab to get stronger. Patient returned from rehab for trach decannulation, which was removed on 12/2.    Now presented from [facility] on 12/13 with AMS, hypotensive, tachycardic found to be COVID (+). Pt is lethargic and mumbling words.      (13 Dec 2021 01:06)    Hospital Course:   The patient was placed in the ICU requiring O2 support, started on dexamethasone, remdesivir, IV pressors. Course C/b by GNR bacteremia found to have serratia. He was then treated for with cefepime. Patient was stable for discharge but then had recurrent fevers and elevated HR 12/30 (thought to be aspiration).  Had repeat Hypoxia early Jan, seen by SLP with recs for VFSS/MBS placed on dysphagia diet. Passed MBS. Has to be on lifelong levaquin therapy for suppression for recurrent bacteremia. Patient also has had falls, CT 1/10 and 1/11 negative.  He again had recurrent serratia now restarted on zosyn. He had a hemoglobin drop and is off AC, vascular following. Pending CT C/A/P.       EP patient had VT 12/20 which improved with BB. Had repeat WCT again 12/28. EP reconsulted for WCT seen on tele 01/11 had WCT 31 beats. Seen 1/12 so he was placed on metoprolol was on 50 then increased to 25 mg in AM and 50 in PM.     PMHx:   No pertinent past medical history    CHF (congestive heart failure)    CAD (coronary artery disease)    Depression    Pleural effusion    History of 2019 novel coronavirus disease (COVID-19)    Hemorrhoids    Bleeding hemorrhoids    Peripheral arterial disease    Claudication    BPH with urinary obstruction    ACC/AHA stage D systolic heart failure    Anticoagulation goal of INR 2.0 to 2.5    Falls    Clavicle fracture    CKD (chronic kidney disease), stage III    Iron deficiency anemia    H/O epistaxis    Vertigo    GI bleed        PSHx:   No significant past surgical history    S/P TVR (tricuspid valve repair)    S/P ventricular assist device    S/P endoscopy    H/O tracheostomy        Allergies:  Amiodarone Hydrochloride (Other (Severe))      Home Meds:  baclofen 5 mg oral tablet: 1 tab(s) orally every 8 hours (05 Jan 2022 13:59)  Colace 100 mg oral capsule: 1 cap(s) orally 2 times a day, As Needed (27 Dec 2021 10:58)  finasteride 5 mg oral tablet: 1 tab(s) orally once a day (27 Dec 2021 10:58)  methIMAzole 10 mg oral tablet: 1 tab(s) orally once a day (27 Dec 2021 10:58)  metoprolol succinate 50 mg oral tablet, extended release: 1 tab(s) orally once a day (27 Dec 2021 10:58)  MiraLax oral powder for reconstitution: Mix 1 capfull in 8 oz of water and drink at bedtime as needed for constipation (27 Dec 2021 10:58)  mirtazapine 7.5 mg oral tablet: 1 tab(s) orally once a day (at bedtime) (27 Dec 2021 10:58)  pantoprazole 40 mg oral delayed release tablet: 1 tab(s) orally once a day (27 Dec 2021 10:58)  Senna 8.6 mg oral tablet: 2 tab(s) orally once a day (at bedtime) (27 Dec 2021 10:58)  sertraline 100 mg oral tablet: 1 tab(s) orally once a day (27 Dec 2021 10:58)  spironolactone 25 mg oral tablet: 1 tab(s) orally once a day (27 Dec 2021 10:58)      Current Medications:   acetaminophen     Tablet .. 650 milliGRAM(s) Oral every 6 hours PRN  baclofen 5 milliGRAM(s) Oral every 8 hours  chlorhexidine 2% Cloths 1 Application(s) Topical <User Schedule>  cholecalciferol 1000 Unit(s) Oral daily  dextrose 50% Injectable 25 milliLiter(s) IV Push every 15 minutes  dextrose 50% Injectable 50 milliLiter(s) IV Push every 15 minutes  magnesium oxide 400 milliGRAM(s) Oral three times a day with meals  methimazole 10 milliGRAM(s) Oral daily  metoclopramide 10 milliGRAM(s) Oral every 8 hours  metoprolol succinate ER 25 milliGRAM(s) Oral daily  metoprolol succinate ER 50 milliGRAM(s) Oral at bedtime  mirtazapine 7.5 milliGRAM(s) Oral at bedtime  multivitamin 1 Tablet(s) Oral daily  pantoprazole    Tablet 40 milliGRAM(s) Oral every 12 hours  piperacillin/tazobactam IVPB.. 3.375 Gram(s) IV Intermittent every 8 hours  potassium phosphate / sodium phosphate Tablet (K-PHOS No. 2) 1 Tablet(s) Oral two times a day  sertraline 100 milliGRAM(s) Oral daily  sodium chloride 1 Gram(s) Oral every 12 hours  sodium chloride 0.9% lock flush 3 milliLiter(s) IV Push every 8 hours  spironolactone 25 milliGRAM(s) Oral daily  vancomycin    Solution 125 milliGRAM(s) Oral every 12 hours        REVIEW OF SYSTEMS:  + Fatigue, + Palpitations, -Dyspnea, +Abdominal Pain, + chest discomfort, +headache, - Dizziness        Physical Exam:  T(F): 98.5 (01-14), Max: 98.5 (01-14)  HR: 101 (01-14) (95 - 110)  BP: --  RR: 18 (01-14)  SpO2: 97% (01-14)    Gen: Uncomfortable   Card: Murmur+  Abdomen: Tender in abdomen   Lung: Bibasilar crackles   Ext: No LE edema     Cardiovascular Diagnostic Testing:    ECG: Personally reviewed:    Echo:     Patient name: RICKY HAMPTON  YOB: 1955   Age: 66 (M)   MR#: [id number]  Study Date: 1/13/2022  Location: [address] [place], [place] [id number]: Poulose Souru, RDCS  Study quality: Technically difficult  Referring Physician: Peace Rodriguez MD  Blood Pressure: 78/66 mmHg  Height: 183 cm  Weight: 54 kg  BSA: 1.7 m2  ------------------------------------------------------------------------  PROCEDURE: Transthoracic echocardiogram with 2-D, M-Mode  and complete spectral and color flow Doppler.  INDICATION: Acute and subacute infective endocarditis  (I33.0)  ------------------------------------------------------------------------  Dimensions:    Normal Values:  LA:     2.8    2.0 - 4.0 cm  Ao:     3.7    2.0 - 3.8 cm  SEPTUM: 0.7    0.6 - 1.2 cm  PWT:    0.9    0.6 - 1.1 cm  LVIDd:  4.6    3.0 - 5.6 cm  LVIDs:  3.2    1.8 - 4.0 cm  Derived variables:  LVMI: 69 g/m2  RWT: 0.39  EF (Visual Estimate):  %  ------------------------------------------------------------------------  Observations:  MitralValve: Normal mitral valve.  Aortic Valve/Aorta: Calcified aortic valve which remains  closed on all observed beats.  Mild, continuous aortic regurgitation.  Normal aortic root size.  Left Atrium: Normal left atrium.  Left Ventricle: Normal left ventricular internal dimensions  and wall thicknesses.  Left ventricular assist device (LVAD) noted in the apex  with laminar flow.  Right Heart: Normal right atrium.  Right ventricular enlargement with decreased right  ventricular systolic function.  An annuloplasty ring is seen in the tricuspid position.  Mild tricuspid regurgitation.  Pericardium/Pleura: Normal pericardium with no pericardial  effusion.  Hemodynamic: No evidence of pulmonary hypertension.  ------------------------------------------------------------------------  Conclusions:  Heartmate 2 at  9200/min.  Normal left ventricular internal dimensions and wall  thicknesses.  Left ventricular assist device (LVAD) noted in the apex  with laminar flow.  Calcified aortic valve which remains closed on all observed  beats. Mild, continuous aortic regurgitation.  Right ventricular enlargement with decreased right  ventricular systolic function.  An annuloplasty ring is seen in the tricuspid position.  Mild tricuspid regurgitation.  ------------------------------------------------------------------------  Confirmed on  1/13/2022 - 13:57:46 by Logan Baeza MD, FASE      Stress Testing:    Cath:  8/2017 [facility]    Imaging:    ACC: [id number] EXAM:  CT BRAIN                        PROCEDURE DATE:  01/12/2022      INTERPRETATION:  CT HEAD    CLINICAL HISTORY: headache    TECHNIQUE:  Noncontrast CT.  Axial Acquisition.  Sagittal and coronal reformations.    COMPARISON:  Exam is compared to prior study of September 8, 2020    FINDINGS:  *  HEMORRHAGE:  No evidence of intracranial hemorrhage.  *  BRAIN PARENCHYMA:  Mild to moderate atrophy. Mild to moderate patchy   periventricular and subcortical white matter ischemic changes. No mass or   mass effect.  *  VENTRICLES / SHIFT:  No hydrocephalus. No midline shift.  *  EXTRA-AXIAL / BASAL CISTERNS:  No extra-axial mass. Basal cisterns   preserved.  *  CALVARIUM AND EXTRACRANIAL SOFT TISSUES:  No depressed calvarial  fracture.  *  SINUSES, ORBITS, MASTOIDS:  The visualized paranasal sinuses and   mastoid air cells are well aerated.    IMPRESSION:  NO EVIDENCE OF AN ACUTE INTRACRANIAL HEMORRHAGE, MIDLINE SHIFT, OR   HYDROCEPHALUS. ATROPHY WITH PATCHY WHITE MATTER ISCHEMIC CHANGES. NO   SIGNIFICANT INTERVAL CHANGE    --- End of Report ---      CXR: Personally reviewed    Labs: Personally reviewed                        8.3    9.75  )-----------( 159      ( 14 Jan 2022 06:36 )             25.7     01-14    129<L>  |  95<L>  |  15  ----------------------------<  93  4.6   |  21<L>  |  0.87    Ca    9.4      14 Jan 2022 06:36  Phos  3.2     01-13  Mg     2.0     01-13                         Shilpa Webber MD  Cardiology Fellow  [phone]  All Cardiology service information can be found 24/7 on [email or web address], password: [email or web address]    Patient seen and evaluated at bedside    Chief Complaint:    HPI:  65M PMH ACC/AHA stage D HF due to NICM HM2 LVAD , TV annuloplasty ring 9/12/17 as destination therapy due to severe peripheral artery disease with significant stenosis  SIADH, Depression, CKD-3 with hyperkalemia, past E. coli UTIs, driveline drainage (1/7/21) and COVID-19 (back in April 2020). Recurrent syncope post LVAD s/p ILR, and chronic abdominal pain and was noted to have a prolong hospital course (6/14-11/16). During that time he has multiple infections and now remains on suppressive antibiotics. Underwent SMA stent with Vascular in 10/2021, now tolerating PO diet, perc dawn drain placement and multiple GI bleeds. Ultimately was trach and discharged to rehab to get stronger. Patient returned from rehab for trach decannulation, which was removed on 12/2.    Now presented from [facility] on 12/13 with AMS, hypotensive, tachycardic found to be COVID (+). Pt is lethargic and mumbling words.      (13 Dec 2021 01:06)    Hospital Course:   The patient was placed in the ICU requiring O2 support, started on dexamethasone, remdesivir, IV pressors. Course C/b by GNR bacteremia found to have serratia. He was then treated for with cefepime. Patient was stable for discharge but then had recurrent fevers and elevated HR 12/30 (thought to be aspiration).  Had repeat Hypoxia early Jan, seen by SLP with recs for VFSS/MBS placed on dysphagia diet. Passed MBS. Has to be on lifelong levaquin therapy for suppression for recurrent bacteremia. Patient also has had fall, CT 1/10 and 1/11 negative.  He again had recurrent serratia now restarted on zosyn. He had a hemoglobin drop and is off AC, vascular following. Pending CT C/A/P.       EP patient had VT 12/20 which improved with BB. Had repeat WCT again 12/28. EP reconsulted for WCT seen on tele 01/11 had WCT 31 beats. Seen 1/12 so he was placed on metoprolol was on 50 then increased to 25 mg in AM and 50 in PM.     PMHx:   No pertinent past medical history    CHF (congestive heart failure)    CAD (coronary artery disease)    Depression    Pleural effusion    History of 2019 novel coronavirus disease (COVID-19)    Hemorrhoids    Bleeding hemorrhoids    Peripheral arterial disease    Claudication    BPH with urinary obstruction    ACC/AHA stage D systolic heart failure    Anticoagulation goal of INR 2.0 to 2.5    Falls    Clavicle fracture    CKD (chronic kidney disease), stage III    Iron deficiency anemia    H/O epistaxis    Vertigo    GI bleed        PSHx:   No significant past surgical history    S/P TVR (tricuspid valve repair)    S/P ventricular assist device    S/P endoscopy    H/O tracheostomy        Allergies:  Amiodarone Hydrochloride (Other (Severe))      Home Meds:  baclofen 5 mg oral tablet: 1 tab(s) orally every 8 hours (05 Jan 2022 13:59)  Colace 100 mg oral capsule: 1 cap(s) orally 2 times a day, As Needed (27 Dec 2021 10:58)  finasteride 5 mg oral tablet: 1 tab(s) orally once a day (27 Dec 2021 10:58)  methIMAzole 10 mg oral tablet: 1 tab(s) orally once a day (27 Dec 2021 10:58)  metoprolol succinate 50 mg oral tablet, extended release: 1 tab(s) orally once a day (27 Dec 2021 10:58)  MiraLax oral powder for reconstitution: Mix 1 capfull in 8 oz of water and drink at bedtime as needed for constipation (27 Dec 2021 10:58)  mirtazapine 7.5 mg oral tablet: 1 tab(s) orally once a day (at bedtime) (27 Dec 2021 10:58)  pantoprazole 40 mg oral delayed release tablet: 1 tab(s) orally once a day (27 Dec 2021 10:58)  Senna 8.6 mg oral tablet: 2 tab(s) orally once a day (at bedtime) (27 Dec 2021 10:58)  sertraline 100 mg oral tablet: 1 tab(s) orally once a day (27 Dec 2021 10:58)  spironolactone 25 mg oral tablet: 1 tab(s) orally once a day (27 Dec 2021 10:58)      Current Medications:   acetaminophen     Tablet .. 650 milliGRAM(s) Oral every 6 hours PRN  baclofen 5 milliGRAM(s) Oral every 8 hours  chlorhexidine 2% Cloths 1 Application(s) Topical <User Schedule>  cholecalciferol 1000 Unit(s) Oral daily  dextrose 50% Injectable 25 milliLiter(s) IV Push every 15 minutes  dextrose 50% Injectable 50 milliLiter(s) IV Push every 15 minutes  magnesium oxide 400 milliGRAM(s) Oral three times a day with meals  methimazole 10 milliGRAM(s) Oral daily  metoclopramide 10 milliGRAM(s) Oral every 8 hours  metoprolol succinate ER 25 milliGRAM(s) Oral daily  metoprolol succinate ER 50 milliGRAM(s) Oral at bedtime  mirtazapine 7.5 milliGRAM(s) Oral at bedtime  multivitamin 1 Tablet(s) Oral daily  pantoprazole    Tablet 40 milliGRAM(s) Oral every 12 hours  piperacillin/tazobactam IVPB.. 3.375 Gram(s) IV Intermittent every 8 hours  potassium phosphate / sodium phosphate Tablet (K-PHOS No. 2) 1 Tablet(s) Oral two times a day  sertraline 100 milliGRAM(s) Oral daily  sodium chloride 1 Gram(s) Oral every 12 hours  sodium chloride 0.9% lock flush 3 milliLiter(s) IV Push every 8 hours  spironolactone 25 milliGRAM(s) Oral daily  vancomycin    Solution 125 milliGRAM(s) Oral every 12 hours        REVIEW OF SYSTEMS:  + Fatigue, + Palpitations, -Dyspnea, +Abdominal Pain, + chest discomfort, +headache, - Dizziness        Physical Exam:  T(F): 98.5 (01-14), Max: 98.5 (01-14)  HR: 101 (01-14) (95 - 110)  BP: --  RR: 18 (01-14)  SpO2: 97% (01-14)    Gen: Uncomfortable   Card: Murmur+  Abdomen: Tender in abdomen   Lung: Bibasilar crackles   Ext: No LE edema     Cardiovascular Diagnostic Testing:    ECG: Personally reviewed:    Echo:     Patient name: RICKY HAMPTON  YOB: 1955   Age: 66 (M)   MR#: [id number]  Study Date: 1/13/2022  Location: [address] [place], [place] [id number]: Poulose Souru, RDCS  Study quality: Technically difficult  Referring Physician: Peace Rodriguez MD  Blood Pressure: 78/66 mmHg  Height: 183 cm  Weight: 54 kg  BSA: 1.7 m2  ------------------------------------------------------------------------  PROCEDURE: Transthoracic echocardiogram with 2-D, M-Mode  and complete spectral and color flow Doppler.  INDICATION: Acute and subacute infective endocarditis  (I33.0)  ------------------------------------------------------------------------  Dimensions:    Normal Values:  LA:     2.8    2.0 - 4.0 cm  Ao:     3.7    2.0 - 3.8 cm  SEPTUM: 0.7    0.6 - 1.2 cm  PWT:    0.9    0.6 - 1.1 cm  LVIDd:  4.6    3.0 - 5.6 cm  LVIDs:  3.2    1.8 - 4.0 cm  Derived variables:  LVMI: 69 g/m2  RWT: 0.39  EF (Visual Estimate):  %  ------------------------------------------------------------------------  Observations:  MitralValve: Normal mitral valve.  Aortic Valve/Aorta: Calcified aortic valve which remains  closed on all observed beats.  Mild, continuous aortic regurgitation.  Normal aortic root size.  Left Atrium: Normal left atrium.  Left Ventricle: Normal left ventricular internal dimensions  and wall thicknesses.  Left ventricular assist device (LVAD) noted in the apex  with laminar flow.  Right Heart: Normal right atrium.  Right ventricular enlargement with decreased right  ventricular systolic function.  An annuloplasty ring is seen in the tricuspid position.  Mild tricuspid regurgitation.  Pericardium/Pleura: Normal pericardium with no pericardial  effusion.  Hemodynamic: No evidence of pulmonary hypertension.  ------------------------------------------------------------------------  Conclusions:  Heartmate 2 at  9200/min.  Normal left ventricular internal dimensions and wall  thicknesses.  Left ventricular assist device (LVAD) noted in the apex  with laminar flow.  Calcified aortic valve which remains closed on all observed  beats. Mild, continuous aortic regurgitation.  Right ventricular enlargement with decreased right  ventricular systolic function.  An annuloplasty ring is seen in the tricuspid position.  Mild tricuspid regurgitation.  ------------------------------------------------------------------------  Confirmed on  1/13/2022 - 13:57:46 by Logan Baeza MD, FASE      Stress Testing:    Cath:  8/2017 [facility]    Imaging:    ACC: [id number] EXAM:  CT BRAIN                        PROCEDURE DATE:  01/12/2022      INTERPRETATION:  CT HEAD    CLINICAL HISTORY: headache    TECHNIQUE:  Noncontrast CT.  Axial Acquisition.  Sagittal and coronal reformations.    COMPARISON:  Exam is compared to prior study of September 8, 2020    FINDINGS:  *  HEMORRHAGE:  No evidence of intracranial hemorrhage.  *  BRAIN PARENCHYMA:  Mild to moderate atrophy. Mild to moderate patchy   periventricular and subcortical white matter ischemic changes. No mass or   mass effect.  *  VENTRICLES / SHIFT:  No hydrocephalus. No midline shift.  *  EXTRA-AXIAL / BASAL CISTERNS:  No extra-axial mass. Basal cisterns   preserved.  *  CALVARIUM AND EXTRACRANIAL SOFT TISSUES:  No depressed calvarial  fracture.  *  SINUSES, ORBITS, MASTOIDS:  The visualized paranasal sinuses and   mastoid air cells are well aerated.    IMPRESSION:  NO EVIDENCE OF AN ACUTE INTRACRANIAL HEMORRHAGE, MIDLINE SHIFT, OR   HYDROCEPHALUS. ATROPHY WITH PATCHY WHITE MATTER ISCHEMIC CHANGES. NO   SIGNIFICANT INTERVAL CHANGE    --- End of Report ---      CXR: Personally reviewed    Labs: Personally reviewed                        8.3    9.75  )-----------( 159      ( 14 Jan 2022 06:36 )             25.7     01-14    129<L>  |  95<L>  |  15  ----------------------------<  93  4.6   |  21<L>  |  0.87    Ca    9.4      14 Jan 2022 06:36  Phos  3.2     01-13  Mg     2.0     01-13                         Shilpa Webber MD  Cardiology Fellow  [phone]  All Cardiology service information can be found 24/7 on [email or web address], password: [email or web address]    Patient seen and evaluated at bedside    Chief Complaint:    HPI:  65M PMH ACC/AHA stage D HF due to NICM HM2 LVAD , TV annuloplasty ring 9/12/17 as destination therapy due to severe peripheral artery disease with significant stenosis  SIADH, Depression, CKD-3 with hyperkalemia, past E. coli UTIs, driveline drainage (1/7/21) and COVID-19 (back in April 2020). Recurrent syncope post LVAD s/p ILR, and chronic abdominal pain and was noted to have a prolong hospital course (6/14-11/16). During that time he has multiple infections and now remains on suppressive antibiotics. Underwent SMA stent with Vascular in 10/2021, now tolerating PO diet, perc dawn drain placement and multiple GI bleeds. Ultimately was trach and discharged to rehab to get stronger. Patient returned from rehab for trach decannulation, which was removed on 12/2.    Now presented from [facility] on 12/13 with AMS, hypotensive, tachycardic found to be COVID (+). Pt is lethargic and mumbling words.      (13 Dec 2021 01:06)    Hospital Course:   The patient was placed in the ICU requiring O2 support, started on dexamethasone, remdesivir, IV pressors. Course C/b by GNR bacteremia found to have serratia. He was then treated for with cefepime. Patient was stable for discharge but then had recurrent fevers and elevated HR 12/30 (thought to be aspiration).  Had repeat Hypoxia early Jan, seen by SLP with recs for VFSS/MBS placed on dysphagia diet. Passed MBS. Has to be on lifelong levaquin therapy for suppression for recurrent bacteremia. Patient also has had fall, CT 1/10 and 1/11 negative.  He again had recurrent serratia now restarted on zosyn. He had a hemoglobin drop and is off AC, vascular following. Pending CT C/A/P. EP patient had VT 12/20 which improved with BB. Had repeat WCT again 12/28. EP reconsulted for WCT seen on tele 01/11 had WCT 31 beats. Seen 1/12 so he was placed on metoprolol was on 50 then increased to 25 mg in AM and 50 in PM.  Patient reports that he was unaware of tachycardia episodes last night and this morning. Reported abdominal pain and headaches. He denied dizziness. Reported some intermittent chest pains that are more chronic.           PMHx:   No pertinent past medical history    CHF (congestive heart failure)    CAD (coronary artery disease)    Depression    Pleural effusion    History of 2019 novel coronavirus disease (COVID-19)    Hemorrhoids    Bleeding hemorrhoids    Peripheral arterial disease    Claudication    BPH with urinary obstruction    ACC/AHA stage D systolic heart failure    Anticoagulation goal of INR 2.0 to 2.5    Falls    Clavicle fracture    CKD (chronic kidney disease), stage III    Iron deficiency anemia    H/O epistaxis    Vertigo    GI bleed        PSHx:   No significant past surgical history    S/P TVR (tricuspid valve repair)    S/P ventricular assist device    S/P endoscopy    H/O tracheostomy        Allergies:  Amiodarone Hydrochloride (Other (Severe))      Home Meds:  baclofen 5 mg oral tablet: 1 tab(s) orally every 8 hours (05 Jan 2022 13:59)  Colace 100 mg oral capsule: 1 cap(s) orally 2 times a day, As Needed (27 Dec 2021 10:58)  finasteride 5 mg oral tablet: 1 tab(s) orally once a day (27 Dec 2021 10:58)  methIMAzole 10 mg oral tablet: 1 tab(s) orally once a day (27 Dec 2021 10:58)  metoprolol succinate 50 mg oral tablet, extended release: 1 tab(s) orally once a day (27 Dec 2021 10:58)  MiraLax oral powder for reconstitution: Mix 1 capfull in 8 oz of water and drink at bedtime as needed for constipation (27 Dec 2021 10:58)  mirtazapine 7.5 mg oral tablet: 1 tab(s) orally once a day (at bedtime) (27 Dec 2021 10:58)  pantoprazole 40 mg oral delayed release tablet: 1 tab(s) orally once a day (27 Dec 2021 10:58)  Senna 8.6 mg oral tablet: 2 tab(s) orally once a day (at bedtime) (27 Dec 2021 10:58)  sertraline 100 mg oral tablet: 1 tab(s) orally once a day (27 Dec 2021 10:58)  spironolactone 25 mg oral tablet: 1 tab(s) orally once a day (27 Dec 2021 10:58)      Current Medications:   acetaminophen     Tablet .. 650 milliGRAM(s) Oral every 6 hours PRN  baclofen 5 milliGRAM(s) Oral every 8 hours  chlorhexidine 2% Cloths 1 Application(s) Topical <User Schedule>  cholecalciferol 1000 Unit(s) Oral daily  dextrose 50% Injectable 25 milliLiter(s) IV Push every 15 minutes  dextrose 50% Injectable 50 milliLiter(s) IV Push every 15 minutes  magnesium oxide 400 milliGRAM(s) Oral three times a day with meals  methimazole 10 milliGRAM(s) Oral daily  metoclopramide 10 milliGRAM(s) Oral every 8 hours  metoprolol succinate ER 25 milliGRAM(s) Oral daily  metoprolol succinate ER 50 milliGRAM(s) Oral at bedtime  mirtazapine 7.5 milliGRAM(s) Oral at bedtime  multivitamin 1 Tablet(s) Oral daily  pantoprazole    Tablet 40 milliGRAM(s) Oral every 12 hours  piperacillin/tazobactam IVPB.. 3.375 Gram(s) IV Intermittent every 8 hours  potassium phosphate / sodium phosphate Tablet (K-PHOS No. 2) 1 Tablet(s) Oral two times a day  sertraline 100 milliGRAM(s) Oral daily  sodium chloride 1 Gram(s) Oral every 12 hours  sodium chloride 0.9% lock flush 3 milliLiter(s) IV Push every 8 hours  spironolactone 25 milliGRAM(s) Oral daily  vancomycin    Solution 125 milliGRAM(s) Oral every 12 hours        REVIEW OF SYSTEMS:  + Fatigue, + Palpitations, -Dyspnea, +Abdominal Pain, + chest discomfort, +headache, - Dizziness        Physical Exam:  T(F): 98.5 (01-14), Max: 98.5 (01-14)  HR: 101 (01-14) (95 - 110)  BP: --  RR: 18 (01-14)  SpO2: 97% (01-14)    Gen: Uncomfortable   Card: Murmur+  Abdomen: Tender in abdomen   Lung: Bibasilar crackles   Ext: No LE edema     Cardiovascular Diagnostic Testing:    ECG: Personally reviewed:    Echo:     Patient name: RICKY HAMPTON  YOB: 1955   Age: 66 (M)   MR#: [id number]  Study Date: 1/13/2022  [id number] Karla Sellers RDCS  Study quality: Technically difficult  Referring Physician: Peace Rodriguez MD  Blood Pressure: 78/66 mmHg  Height: 183 cm  Weight: 54 kg  BSA: 1.7 m2  ------------------------------------------------------------------------  PROCEDURE: Transthoracic echocardiogram with 2-D, M-Mode  and complete spectral and color flow Doppler.  INDICATION: Acute and subacute infective endocarditis  (I33.0)  ------------------------------------------------------------------------  Dimensions:    Normal Values:  LA:     2.8    2.0 - 4.0 cm  Ao:     3.7    2.0 - 3.8 cm  SEPTUM: 0.7    0.6 - 1.2 cm  PWT:    0.9    0.6 - 1.1 cm  LVIDd:  4.6    3.0 - 5.6 cm  LVIDs:  3.2    1.8 - 4.0 cm  Derived variables:  LVMI: 69 g/m2  RWT: 0.39  EF (Visual Estimate):  %  ------------------------------------------------------------------------  Observations:  MitralValve: Normal mitral valve.  Aortic Valve/Aorta: Calcified aortic valve which remains  closed on all observed beats.  Mild, continuous aortic regurgitation.  Normal aortic root size.  Left Atrium: Normal left atrium.  Left Ventricle: Normal left ventricular internal dimensions  and wall thicknesses.  Left ventricular assist device (LVAD) noted in the apex  with laminar flow.  Right Heart: Normal right atrium.  Right ventricular enlargement with decreased right  ventricular systolic function.  An annuloplasty ring is seen in the tricuspid position.  Mild tricuspid regurgitation.  Pericardium/Pleura: Normal pericardium with no pericardial  effusion.  Hemodynamic: No evidence of pulmonary hypertension.  ------------------------------------------------------------------------  Conclusions:  Heartmate 2 at  9200/min.  Normal left ventricular internal dimensions and wall  thicknesses.  Left ventricular assist device (LVAD) noted in the apex  with laminar flow.  Calcified aortic valve which remains closed on all observed  beats. Mild, continuous aortic regurgitation.  Right ventricular enlargement with decreased right  ventricular systolic function.  An annuloplasty ring is seen in the tricuspid position.  Mild tricuspid regurgitation.  ------------------------------------------------------------------------  Confirmed on  1/13/2022 - 13:57:46 by Logan Baeza MD, FASE      Stress Testing:    Cath:  8/2017 [facility]    Imaging:    ACC: [id number] EXAM:  CT BRAIN                        PROCEDURE DATE:  01/12/2022      INTERPRETATION:  CT HEAD    CLINICAL HISTORY: headache    TECHNIQUE:  Noncontrast CT.  Axial Acquisition.  Sagittal and coronal reformations.    COMPARISON:  Exam is compared to prior study of September 8, 2020    FINDINGS:  *  HEMORRHAGE:  No evidence of intracranial hemorrhage.  *  BRAIN PARENCHYMA:  Mild to moderate atrophy. Mild to moderate patchy   periventricular and subcortical white matter ischemic changes. No mass or   mass effect.  *  VENTRICLES / SHIFT:  No hydrocephalus. No midline shift.  *  EXTRA-AXIAL / BASAL CISTERNS:  No extra-axial mass. Basal cisterns   preserved.  *  CALVARIUM AND EXTRACRANIAL SOFT TISSUES:  No depressed calvarial  fracture.  *  SINUSES, ORBITS, MASTOIDS:  The visualized paranasal sinuses and   mastoid air cells are well aerated.    IMPRESSION:  NO EVIDENCE OF AN ACUTE INTRACRANIAL HEMORRHAGE, MIDLINE SHIFT, OR   HYDROCEPHALUS. ATROPHY WITH PATCHY WHITE MATTER ISCHEMIC CHANGES. NO   SIGNIFICANT INTERVAL CHANGE    --- End of Report ---      CXR: Personally reviewed    Labs: Personally reviewed                        8.3    9.75  )-----------( 159      ( 14 Jan 2022 06:36 )             25.7     01-14    129<L>  |  95<L>  |  15  ----------------------------<  93  4.6   |  21<L>  |  0.87    Ca    9.4      14 Jan 2022 06:36  Phos  3.2     01-13  Mg     2.0     01-13

## 2022-01-14 NOTE — PROGRESS NOTE ADULT - PROBLEM SELECTOR PLAN 1
- Continue with speed of 9200  - s/p controller change on 12/16   - Daily LDH  - due to drop in Hgb remains off ASA, will resume when feasible   - due to hx of GI bleeding, please send fecal occult  - Off coumadin due to persistent recurrent GI bleeding

## 2022-01-14 NOTE — PROGRESS NOTE ADULT - SUBJECTIVE AND OBJECTIVE BOX
Patient is a 66y old  Male who presents with a chief complaint of COVID (+) with AMS and hypotension (14 Jan 2022 07:18)      SUBJECTIVE / OVERNIGHT EVENTS: sinus  on tele with epsidoes of vtach, denies cp, palpitations     MEDICATIONS  (STANDING):  baclofen 5 milliGRAM(s) Oral every 8 hours  chlorhexidine 2% Cloths 1 Application(s) Topical <User Schedule>  cholecalciferol 1000 Unit(s) Oral daily  dextrose 50% Injectable 25 milliLiter(s) IV Push every 15 minutes  dextrose 50% Injectable 50 milliLiter(s) IV Push every 15 minutes  magnesium oxide 400 milliGRAM(s) Oral three times a day with meals  methimazole 10 milliGRAM(s) Oral daily  metoclopramide 10 milliGRAM(s) Oral every 8 hours  metoprolol succinate ER 25 milliGRAM(s) Oral daily  metoprolol succinate ER 50 milliGRAM(s) Oral at bedtime  mirtazapine 7.5 milliGRAM(s) Oral at bedtime  multivitamin 1 Tablet(s) Oral daily  pantoprazole    Tablet 40 milliGRAM(s) Oral every 12 hours  piperacillin/tazobactam IVPB.. 3.375 Gram(s) IV Intermittent every 8 hours  potassium phosphate / sodium phosphate Tablet (K-PHOS No. 2) 1 Tablet(s) Oral two times a day  sertraline 100 milliGRAM(s) Oral daily  sodium chloride 1 Gram(s) Oral every 12 hours  sodium chloride 0.9% lock flush 3 milliLiter(s) IV Push every 8 hours  spironolactone 25 milliGRAM(s) Oral daily  vancomycin    Solution 125 milliGRAM(s) Oral every 12 hours    MEDICATIONS  (PRN):  acetaminophen     Tablet .. 650 milliGRAM(s) Oral every 6 hours PRN Temp greater or equal to 38C (100.4F), Mild Pain (1 - 3), Moderate Pain (4 - 6)        CAPILLARY BLOOD GLUCOSE        I&O's Summary    13 Jan 2022 07:01  -  14 Jan 2022 07:00  --------------------------------------------------------  IN: 960 mL / OUT: 820 mL / NET: 140 mL        PHYSICAL EXAM:  GENERAL: NAD, frail   HEAD:  Atraumatic, Normocephalic  EYES: conjunctiva and sclera clear  NECK:  No JVD  CHEST/LUNG: Clear to auscultation bilaterally; No wheeze  HEART: Regular rate and rhythm; No murmurs, rubs, or gallops  ABDOMEN: Soft, Nontender, Nondistended; Bowel sounds present  EXTREMITIES:  2+ Peripheral Pulses, No clubbing, cyanosis, or edema  LABS:                        8.3    9.75  )-----------( 159      ( 14 Jan 2022 06:36 )             25.7     01-14    129<L>  |  95<L>  |  15  ----------------------------<  93  4.6   |  21<L>  |  0.87    Ca    9.4      14 Jan 2022 06:36  Phos  3.2     01-13  Mg     2.0     01-13                RADIOLOGY & ADDITIONAL TESTS:    Imaging Personally Reviewed:    Consultant(s) Notes Reviewed:      Care Discussed with Consultants/Other Providers:

## 2022-01-14 NOTE — PROGRESS NOTE ADULT - PROBLEM SELECTOR PLAN 2
- leukocytosis is overall improving on IV abx, remains afebrile  - hx of Serratia bacteremia, his most recent blood culture 1/11 positive for serratia marcescens   - new Blood Culture to be taken today   - currently on IV zosyn   - ID following  - continue with Vanco PO for C. Diff ppx   - CT C/A/P pending

## 2022-01-14 NOTE — PROGRESS NOTE ADULT - ASSESSMENT
66yo man with a history of VAD palcement, history of COVID infeciton in April 2020, history of a prolonged hospitalization in 2021 with recurrent sepsis, pneumonia, intubated/trach x2 cycles , chronic gall bladder disease s/p perc dawn, SMA ischemia reuiring a stent placement, cachecxia who was discharged to rehab but is presented from [facility] on 12/13 with AMS, hypotensive, tachycardic found to be COVID (+). Pt is lethargic and mumbling words required ICU admission and pressor support but recovered and is off oxygen on general cardiac floor    Recurrent GNR bacteremia:    Blood cultures sent on 1/11/22 with GNR serratia preliminary organism ID  sensitivity pending  most recent prior + blood culture with serratia sensitive strain  Can start Zosyn 3.375gm IV q 8hr  source? VAD vs abdominal source  would rep[eat CT of abdomen and chest  consider TTE to look for vegetations which are less likely with gram negative bacteremia  repeat blood cultures for clearance of infection on 1/14/22    COVID infection-   confirmed second bout of COVID by documented PCR testing  Ideally, would be interested in sequencing the virus to determine strain (omicron vs delta)  Received a dose of monoclonal antibodies Regeneron product   Completed remdesivir therapy  Completed ten days of dexamethasone  Refuses most vaccines- but will need to wait three months  to receive COVID vaccine series post monoclonal  Will administer flu/pneumonia vaccines this admission prior to discharge if he will permit      Source of serratia not entirely clear- differential GI translocation, biliary tree, lungs, other including LVAD  blood cultures clearing  serratia sensitive to Zosyn      Prior severe C.diff infection   pre-emptive oral Vanco 125mg bid      Morris Prater MD  [phone]  After 5pm/weekends [phone]

## 2022-01-14 NOTE — PROGRESS NOTE ADULT - PROBLEM SELECTOR PLAN 1
History of recurrent pneumonia and bacteremia; With stenotrophomonas in prior sputum cultures and enterobacter and serratia in blood in October 2021  -Blood cultures 12/13 + Serratia  -s/p course of Cefepime  -repeat bcx NGTD  -s/p Levaquin 500mg QD for suppression therapy  -PO vanc 125mg BID for C. diff ppx  -no longer candidate for bactrim as not tolerated by pt  -blood cx 1/12 + serratia, resumed IV zosyn 1/12--  - follow up repeat blood cx, follow up CT C/A/P, f/u echo, trend wbc and temp curve

## 2022-01-14 NOTE — PROGRESS NOTE ADULT - SUBJECTIVE AND OBJECTIVE BOX
INFECTIOUS DISEASES FOLLOW UP-- Anitra Prater  [phone]    This is a follow up note for this  66yMale with  Sepsis        ROS:  CONSTITUTIONAL:  No fever, good appetite  CARDIOVASCULAR:  No chest pain or palpitations vad sounds  RESPIRATORY:  No dyspnea  GASTROINTESTINAL:  No nausea, vomiting, diarrhea, or abdominal pain  GENITOURINARY:  No dysuria  NEUROLOGIC:  No headache,     Allergies    Amiodarone Hydrochloride (Other (Severe))    Intolerances        ANTIBIOTICS/RELEVANT:  antimicrobials  piperacillin/tazobactam IVPB.. 3.375 Gram(s) IV Intermittent every 8 hours  vancomycin    Solution 125 milliGRAM(s) Oral every 12 hours    immunologic:    OTHER:  acetaminophen     Tablet .. 650 milliGRAM(s) Oral every 6 hours PRN  baclofen 5 milliGRAM(s) Oral every 8 hours  chlorhexidine 2% Cloths 1 Application(s) Topical <User Schedule>  cholecalciferol 1000 Unit(s) Oral daily  dextrose 50% Injectable 25 milliLiter(s) IV Push every 15 minutes  dextrose 50% Injectable 50 milliLiter(s) IV Push every 15 minutes  magnesium oxide 400 milliGRAM(s) Oral three times a day with meals  methimazole 10 milliGRAM(s) Oral daily  metoclopramide 10 milliGRAM(s) Oral every 8 hours  metoprolol succinate ER 25 milliGRAM(s) Oral daily  metoprolol succinate ER 50 milliGRAM(s) Oral at bedtime  mexiletine 150 milliGRAM(s) Oral every 8 hours  mirtazapine 7.5 milliGRAM(s) Oral at bedtime  multivitamin 1 Tablet(s) Oral daily  pantoprazole    Tablet 40 milliGRAM(s) Oral every 12 hours  potassium phosphate / sodium phosphate Tablet (K-PHOS No. 2) 1 Tablet(s) Oral two times a day  sertraline 100 milliGRAM(s) Oral daily  sodium chloride 1 Gram(s) Oral every 12 hours  sodium chloride 0.9% lock flush 3 milliLiter(s) IV Push every 8 hours  spironolactone 25 milliGRAM(s) Oral daily      Objective:  Vital Signs Last 24 Hrs  T(C): 37.3 (14 Jan 2022 20:49), Max: 37.3 (14 Jan 2022 20:49)  T(F): 99.1 (14 Jan 2022 20:49), Max: 99.1 (14 Jan 2022 20:49)  HR: 102 (14 Jan 2022 20:49) (100 - 111)  BP: --  BP(mean): 70 (14 Jan 2022 20:00) (64 - 78)  RR: 18 (14 Jan 2022 20:49) (18 - 18)  SpO2: 98% (14 Jan 2022 20:49) (97% - 98%)    PHYSICAL EXAM:  Constitutional:no acute distress  Eyes:ALONSO, EOMI  Ear/Nose/Throat: no oral lesions, 	  Respiratory: clear BL  Cardiovascular: S1S2 VAD SOUNDS  Gastrointestinal:soft, (+) BS, no tenderness  Extremities:no e/e/c  No Lymphadenopathy  IV sites not inflammed.    LABS:                        8.3    9.75  )-----------( 159      ( 14 Jan 2022 06:36 )             25.7     01-14    129<L>  |  95<L>  |  15  ----------------------------<  93  4.6   |  21<L>  |  0.87    Ca    9.4      14 Jan 2022 06:36  Phos  3.2     01-13  Mg     2.0     01-13            MICROBIOLOGY:            RECENT CULTURES:  01-13 @ 22:53  .Blood Blood  --  --  --    No growth to date.  --  01-11 @ 23:02  .Blood Blood-Venous  Blood Culture PCR  Serratia marcescens  Blood Culture PCR  PCR    Growth in aerobic and anaerobic bottles: Serratia marcescens  ***Blood Panel PCR results on this specimen are available  approximately 3 hours after the Gram stain result.***  Gram stain, PCR, and/or culture results may not always  correspond due to difference in methodologies.  ************************************************************  This PCR assay was performed by multiplex PCR. This  Assay tests for 66 bacterial and resistance gene targets.  Please refer to the [facility] test directory  at [email or web address] for details.  --      RADIOLOGY & ADDITIONAL STUDIES:

## 2022-01-15 NOTE — CHART NOTE - NSCHARTNOTEFT_GEN_A_CORE
Patient with stable hgb, creatinine and vitals. No changes to management today.     Kiana Wheat MD  Cardiology Fellow

## 2022-01-15 NOTE — PROVIDER CONTACT NOTE (OTHER) - ACTION/TREATMENT ORDERED:
Provider made aware. Provider came to bedside to assess patient. Vitals taken, ECG completed and Trops ordered

## 2022-01-15 NOTE — PROGRESS NOTE ADULT - ASSESSMENT
65M PMHx VAD placement history of COVID infection in April 2020, history of a prolonged hospitalization in 2021 with recurrent sepsis, pneumonia, intubated/trach x2 cycles , chronic gall bladder disease s/p perc dawn, SMA ischemia requiring a stent placement, cachexia who was discharged to rehab but is presented from [facility] on 12/13 with AMS, hypotensive, tachycardic found to be COVID (+). Pt lethargic and mumbling words required ICU admission and pressor support now titrated off and downgraded to the floor. Now with blood cx + for serratia

## 2022-01-15 NOTE — PROGRESS NOTE ADULT - PROBLEM SELECTOR PLAN 4
-hydralazine discontinued  -c/w spironolactone 25 mg QD  -c/w metoprolol succinate 50 mg Qhs and 25mg qam   -replete Mg to 2-2.5, K 4-4.5  -Daily PT  -Encourage oral intake and nutrition, salt tabs  -EP eval as pt having multiple runs of wide complex tachycardia- mexitil 150mg q8 added

## 2022-01-15 NOTE — PROGRESS NOTE ADULT - SUBJECTIVE AND OBJECTIVE BOX
Patient is a 66y old  Male who presents with a chief complaint of COVID (+) with AMS and hypotension (14 Jan 2022 23:27)      SUBJECTIVE / OVERNIGHT EVENTS: appears comfortable, chest pain has resolved     MEDICATIONS  (STANDING):  baclofen 5 milliGRAM(s) Oral every 8 hours  chlorhexidine 2% Cloths 1 Application(s) Topical <User Schedule>  cholecalciferol 1000 Unit(s) Oral daily  dextrose 50% Injectable 25 milliLiter(s) IV Push every 15 minutes  dextrose 50% Injectable 50 milliLiter(s) IV Push every 15 minutes  magnesium oxide 400 milliGRAM(s) Oral three times a day with meals  methimazole 10 milliGRAM(s) Oral daily  metoclopramide 10 milliGRAM(s) Oral every 8 hours  metoprolol succinate ER 25 milliGRAM(s) Oral daily  metoprolol succinate ER 50 milliGRAM(s) Oral at bedtime  mexiletine 150 milliGRAM(s) Oral every 8 hours  mirtazapine 7.5 milliGRAM(s) Oral at bedtime  multivitamin 1 Tablet(s) Oral daily  pantoprazole    Tablet 40 milliGRAM(s) Oral every 12 hours  piperacillin/tazobactam IVPB.. 3.375 Gram(s) IV Intermittent every 8 hours  potassium phosphate / sodium phosphate Tablet (K-PHOS No. 2) 1 Tablet(s) Oral two times a day  sertraline 100 milliGRAM(s) Oral daily  sodium chloride 1 Gram(s) Oral every 12 hours  sodium chloride 0.9% lock flush 3 milliLiter(s) IV Push every 8 hours  spironolactone 25 milliGRAM(s) Oral daily  vancomycin    Solution 125 milliGRAM(s) Oral every 12 hours    MEDICATIONS  (PRN):  acetaminophen     Tablet .. 650 milliGRAM(s) Oral every 6 hours PRN Temp greater or equal to 38C (100.4F), Mild Pain (1 - 3), Moderate Pain (4 - 6)        CAPILLARY BLOOD GLUCOSE        I&O's Summary    14 Jan 2022 07:01  -  15 Norman 2022 07:00  --------------------------------------------------------  IN: 680 mL / OUT: 1290 mL / NET: -610 mL    15 Norman 2022 07:01  -  15 Jan 2022 11:48  --------------------------------------------------------  IN: 360 mL / OUT: 600 mL / NET: -240 mL        PHYSICAL EXAM:  GENERAL: NAD, frail   HEAD:  Atraumatic, Normocephalic  EYES: conjunctiva and sclera clear  NECK:  No JVD  CHEST/LUNG: Clear to auscultation bilaterally; No wheeze  HEART: Regular rate and rhythm; No murmurs, rubs, or gallops  ABDOMEN: Soft, Nontender, Nondistended; Bowel sounds present  EXTREMITIES:  2+ Peripheral Pulses, No clubbing, cyanosis, or edema    LABS:                        8.4    9.85  )-----------( 175      ( 15 Norman 2022 06:54 )             25.8     01-15    130<L>  |  95<L>  |  14  ----------------------------<  108<H>  4.1   |  24  |  0.90    Ca    9.4      15 Norman 2022 06:52  Phos  4.0     01-15  Mg     2.0     01-15    TPro  8.4<H>  /  Alb  3.1<L>  /  TBili  0.3  /  DBili  x   /  AST  18  /  ALT  11  /  AlkPhos  141<H>  01-15              RADIOLOGY & ADDITIONAL TESTS:    Imaging Personally Reviewed:    Consultant(s) Notes Reviewed:      Care Discussed with Consultants/Other Providers:

## 2022-01-15 NOTE — PROGRESS NOTE ADULT - PROBLEM SELECTOR PLAN 9
-Diet: Regular  -DVT: scd  CT head negative for acute infarct   -Dispo: Pending LVAD accepting facility when medically optimized

## 2022-01-15 NOTE — CHART NOTE - NSCHARTNOTEFT_GEN_A_CORE
Called by RN to report patient with 8 and 10 beats on tele of wct - with Chest pain   - Patient seen and examined -  used to communicate , patient reports RCW pain 4 out of 10 non radiating , non diaphoresis , no jaw pain   - EKG ordered ( unchanged - reviewed with Dr. Rodriguez ) Trop ordered negative  - Cardiology following - patient with known chest pain - start prior on Mexitil  - Discussed above info with Dr. Rodriguez - will cont to monitor .

## 2022-01-16 NOTE — CHART NOTE - NSCHARTNOTEFT_GEN_A_CORE
Notified of one episode on NSVT ~30 second this evening. No clinical change in patient's condition. Reviewed telemetry.     Plan:   1. Continue current dose of metoprolol and mexiletine.   2. Check K, and Mag and keep K>4, Mag >2.   3. Continue to monitor on telemetry, and notify fellow on call regarding any changes.     Cj Justice MD  Cardiology Fellow   [phone]    Please check [email or web address] password: [email or web address]" for cardiology service schedule and contact information.

## 2022-01-16 NOTE — PROGRESS NOTE ADULT - SUBJECTIVE AND OBJECTIVE BOX
Patient is a 66y old  Male who presents with a chief complaint of COVID (+) with AMS and hypotension (15 Norman 2022 11:48)      SUBJECTIVE / OVERNIGHT EVENTS:  No current complaitns   Tele reviewed: Pt with 33 beats of WCT       ADDITIONAL REVIEW OF SYSTEMS: Negative except for above    MEDICATIONS  (STANDING):  baclofen 5 milliGRAM(s) Oral every 8 hours  chlorhexidine 2% Cloths 1 Application(s) Topical <User Schedule>  cholecalciferol 1000 Unit(s) Oral daily  dextrose 50% Injectable 50 milliLiter(s) IV Push every 15 minutes  dextrose 50% Injectable 25 milliLiter(s) IV Push every 15 minutes  magnesium oxide 400 milliGRAM(s) Oral three times a day with meals  methimazole 10 milliGRAM(s) Oral daily  metoclopramide 10 milliGRAM(s) Oral every 8 hours  metoprolol succinate ER 50 milliGRAM(s) Oral at bedtime  metoprolol succinate ER 25 milliGRAM(s) Oral daily  mexiletine 150 milliGRAM(s) Oral every 8 hours  mirtazapine 7.5 milliGRAM(s) Oral at bedtime  multivitamin 1 Tablet(s) Oral daily  pantoprazole    Tablet 40 milliGRAM(s) Oral every 12 hours  piperacillin/tazobactam IVPB.. 3.375 Gram(s) IV Intermittent every 8 hours  potassium phosphate / sodium phosphate Tablet (K-PHOS No. 2) 1 Tablet(s) Oral two times a day  sertraline 100 milliGRAM(s) Oral daily  sodium chloride 1 Gram(s) Oral every 12 hours  sodium chloride 0.9% lock flush 3 milliLiter(s) IV Push every 8 hours  spironolactone 25 milliGRAM(s) Oral daily  vancomycin    Solution 125 milliGRAM(s) Oral every 12 hours    MEDICATIONS  (PRN):  acetaminophen     Tablet .. 650 milliGRAM(s) Oral every 6 hours PRN Temp greater or equal to 38C (100.4F), Mild Pain (1 - 3), Moderate Pain (4 - 6)      CAPILLARY BLOOD GLUCOSE        I&O's Summary    15 Norman 2022 07:01  -  16 Jan 2022 07:00  --------------------------------------------------------  IN: 1040 mL / OUT: 1925 mL / NET: -885 mL    16 Jan 2022 07:01  -  16 Jan 2022 17:11  --------------------------------------------------------  IN: 560 mL / OUT: 450 mL / NET: 110 mL        PHYSICAL EXAM:  Vital Signs Last 24 Hrs  T(C): 36.7 (16 Jan 2022 16:58), Max: 36.9 (15 Norman 2022 21:18)  T(F): 98 (16 Jan 2022 16:58), Max: 98.4 (15 Norman 2022 21:18)  HR: 90 (16 Jan 2022 16:58) (78 - 90)  BP: 92/60 (16 Jan 2022 04:42) (91/66 - 95/66)  BP(mean): 76 (16 Jan 2022 16:58) (71 - 76)  RR: 17 (16 Jan 2022 16:58) (17 - 19)  SpO2: 99% (16 Jan 2022 16:58) (96% - 99%)    PHYSICAL EXAM:  GENERAL: NAD, frail   HEAD:  Atraumatic, Normocephalic  EYES: conjunctiva and sclera clear  NECK:  No JVD  CHEST/LUNG: Clear to auscultation bilaterally; No wheeze  HEART: Regular rate and rhythm; No murmurs, rubs, or gallops  ABDOMEN: Soft, Nontender, Nondistended; Bowel sounds present  EXTREMITIES:  2+ Peripheral Pulses, No clubbing, cyanosis, or edema      LABS:                        8.6    10.76 )-----------( 199      ( 16 Jan 2022 09:37 )             27.3     01-16    128<L>  |  95<L>  |  11  ----------------------------<  140<H>  3.8   |  23  |  0.93    Ca    9.6      16 Jan 2022 09:37  Phos  3.3     01-16  Mg     2.0     01-16    TPro  8.8<H>  /  Alb  3.3  /  TBili  0.3  /  DBili  x   /  AST  22  /  ALT  16  /  AlkPhos  158<H>  01-16              Culture - Blood (collected 14 Jan 2022 10:22)  Source: .Blood Blood  Preliminary Report (15 Norman 2022 11:01):    No growth to date.    Culture - Blood (collected 14 Jan 2022 10:22)  Source: .Blood Blood  Preliminary Report (15 Norman 2022 11:01):    No growth to date.    Culture - Blood (collected 13 Jan 2022 22:53)  Source: .Blood Blood  Preliminary Report (14 Jan 2022 23:01):    No growth to date.        RADIOLOGY & ADDITIONAL TESTS:    Imaging Personally Reviewed:    Electrocardiogram Personally Reviewed:    COORDINATION OF CARE:  Care Discussed with Consultants/Other Providers [Y/N]:  Prior or Outpatient Records Reviewed [Y/N]:

## 2022-01-16 NOTE — PROGRESS NOTE ADULT - PROBLEM SELECTOR PLAN 2
COVID PCR + 12/13. Recent CXR clear, respiratory status stable on room air; s/p monoclonal antibodies Regeneron, s/p 5 day course of Remdesivir, s/p 10 day course of Dexamethasone  -repeat COVID PCR 12/26, 12/28 positive  -COVID negative 1/3/2022  - monitor SPO2

## 2022-01-16 NOTE — PROGRESS NOTE ADULT - PROBLEM SELECTOR PLAN 4
-hydralazine discontinued  -c/w spironolactone 25 mg QD  -c/w metoprolol succinate 50 mg Qhs and 25mg qam   -replete Mg to 2-2.5, K 4-4.5  -Daily PT  -Encourage oral intake and nutrition, salt tabs  -EP eval as pt having multiple runs of wide complex tachycardia- mexitil 150mg q8 added--> since pt has many WCT will discuss with the EP team about any role for increasing the Mexitil

## 2022-01-16 NOTE — PROGRESS NOTE ADULT - ASSESSMENT
65M PMHx VAD placement history of COVID infection in April 2020, history of a prolonged hospitalization in 2021 with recurrent sepsis, pneumonia, intubated/trach x2 cycles , chronic gall bladder disease s/p perc dawn, SMA ischemia requiring a stent placement, cachexia who was discharged to rehab but is presented from [facility] on 12/13 with AMS, hypotensive, tachycardic found to be COVID (+). Pt lethargic and mumbling words required ICU admission and pressor support now titrated off and downgraded to the floor. Now with blood cx + for serratia

## 2022-01-17 NOTE — PROGRESS NOTE ADULT - PROBLEM SELECTOR PLAN 3
resolved  - no gross evidence of ongoing infection at this time  - patient currently comfortable on RA  - possible that patient may have had aspiration event  - CXR negative  - MBS passed (1/3/22) = pt now on regular diet

## 2022-01-17 NOTE — PROGRESS NOTE ADULT - ASSESSMENT
65M PMHx VAD placement history of COVID infection in April 2020, history of a prolonged hospitalization in 2021 with recurrent sepsis, pneumonia, intubated/trach x2 cycles , chronic gall bladder disease s/p perc dawn, SMA ischemia requiring a stent placement, cachexia who was discharged to rehab but is presented from [facility] on 12/13 with AMS, hypotensive, tachycardic found to be COVID (+). Pt lethargic and mumbling words required ICU admission and pressor support now titrated off and downgraded to the floor. Now with blood cx + for serratia

## 2022-01-17 NOTE — PROGRESS NOTE ADULT - PROBLEM SELECTOR PLAN 1
History of recurrent pneumonia and bacteremia; With stenotrophomonas in prior sputum cultures and enterobacter and serratia in blood in October 2021  -Blood cultures 12/13 + Serratia  -s/p course of Cefepime  -s/p Levaquin 500mg QD for suppression therapy  -PO vanc 125mg BID for C. diff ppx  -no longer candidate for bactrim as not tolerated by pt  -blood cx 1/12 + serratia, resumed IV zosyn 1/12--  repeat bcx 1/14 NGTD  follow up CT C/A/P (pt refusing to go)  - TTE 1/13 with no vegetations or abscesses, consider COREY if bacteremia perissits  - trend wbc and temp curve  - f/u ID recs

## 2022-01-17 NOTE — PROGRESS NOTE ADULT - SUBJECTIVE AND OBJECTIVE BOX
[facility] Division of Hospital Medicine  Cristobal Sutherland  Pager: [phone]      Patient is a 66y old  Male who presents with a chief complaint of COVID (+) with AMS and hypotension (16 Jan 2022 17:11)      SUBJECTIVE / OVERNIGHT EVENTS:  ADDITIONAL REVIEW OF SYSTEMS:    MEDICATIONS  (STANDING):  baclofen 5 milliGRAM(s) Oral every 8 hours  chlorhexidine 2% Cloths 1 Application(s) Topical <User Schedule>  cholecalciferol 1000 Unit(s) Oral daily  dextrose 50% Injectable 25 milliLiter(s) IV Push every 15 minutes  dextrose 50% Injectable 50 milliLiter(s) IV Push every 15 minutes  magnesium oxide 400 milliGRAM(s) Oral three times a day with meals  methimazole 10 milliGRAM(s) Oral daily  metoclopramide 10 milliGRAM(s) Oral every 8 hours  metoprolol succinate ER 50 milliGRAM(s) Oral at bedtime  metoprolol succinate ER 25 milliGRAM(s) Oral daily  mexiletine 150 milliGRAM(s) Oral every 8 hours  mirtazapine 7.5 milliGRAM(s) Oral at bedtime  multivitamin 1 Tablet(s) Oral daily  pantoprazole    Tablet 40 milliGRAM(s) Oral every 12 hours  piperacillin/tazobactam IVPB.. 3.375 Gram(s) IV Intermittent every 8 hours  potassium phosphate / sodium phosphate Tablet (K-PHOS No. 2) 1 Tablet(s) Oral two times a day  sertraline 100 milliGRAM(s) Oral daily  sodium chloride 1 Gram(s) Oral every 8 hours  sodium chloride 0.9% lock flush 3 milliLiter(s) IV Push every 8 hours  spironolactone 25 milliGRAM(s) Oral daily  vancomycin    Solution 125 milliGRAM(s) Oral every 12 hours    MEDICATIONS  (PRN):  acetaminophen     Tablet .. 650 milliGRAM(s) Oral every 6 hours PRN Temp greater or equal to 38C (100.4F), Mild Pain (1 - 3), Moderate Pain (4 - 6)      CAPILLARY BLOOD GLUCOSE        I&O's Summary    16 Jan 2022 07:01  -  17 Jan 2022 07:00  --------------------------------------------------------  IN: 560 mL / OUT: 1300 mL / NET: -740 mL    17 Jan 2022 07:01  -  17 Jan 2022 09:32  --------------------------------------------------------  IN: 0 mL / OUT: 150 mL / NET: -150 mL        PHYSICAL EXAM:  Vital Signs Last 24 Hrs  T(C): 36.9 (17 Jan 2022 08:30), Max: 36.9 (17 Jan 2022 04:32)  T(F): 98.4 (17 Jan 2022 08:30), Max: 98.4 (17 Jan 2022 04:32)  HR: 92 (17 Jan 2022 08:30) (84 - 92)  BP: 91/71 (17 Jan 2022 04:32) (91/71 - 92/74)  BP(mean): 73 (17 Jan 2022 08:30) (73 - 78)  RR: 18 (17 Jan 2022 08:30) (17 - 19)  SpO2: 97% (17 Jan 2022 08:30) (97% - 99%)  CONSTITUTIONAL: NAD, well-developed, well-groomed  EYES: PERRLA; conjunctiva and sclera clear  ENMT: Moist oral mucosa, no pharyngeal injection or exudates; normal dentition  NECK: Supple, no palpable masses; no thyromegaly  RESPIRATORY: Normal respiratory effort; lungs are clear to auscultation bilaterally  CARDIOVASCULAR: Regular rate and rhythm, normal S1 and S2, no murmur/rub/gallop; No lower extremity edema; Peripheral pulses are 2+ bilaterally  ABDOMEN: Nontender to palpation, normoactive bowel sounds, no rebound/guarding; No hepatosplenomegaly  MUSCULOSKELETAL:  Normal gait; no clubbing or cyanosis of digits; no joint swelling or tenderness to palpation  PSYCH: A+O to person, place, and time; affect appropriate  NEUROLOGY: CN 2-12 are intact and symmetric; no gross sensory deficits   SKIN: No rashes; no palpable lesions    LABS:                        8.6    10.76 )-----------( 199      ( 16 Jan 2022 09:37 )             27.3     01-16    128<L>  |  95<L>  |  11  ----------------------------<  140<H>  3.8   |  23  |  0.93    Ca    9.6      16 Jan 2022 09:37  Phos  3.3     01-16  Mg     2.0     01-16    TPro  8.8<H>  /  Alb  3.3  /  TBili  0.3  /  DBili  x   /  AST  22  /  ALT  16  /  AlkPhos  158<H>  01-16              Culture - Blood (collected 14 Jan 2022 10:22)  Source: .Blood Blood  Preliminary Report (15 Norman 2022 11:01):    No growth to date.    Culture - Blood (collected 14 Jan 2022 10:22)  Source: .Blood Blood  Preliminary Report (15 Norman 2022 11:01):    No growth to date.        RADIOLOGY & ADDITIONAL TESTS:  Results Reviewed:   Imaging Personally Reviewed:  Electrocardiogram Personally Reviewed:    COORDINATION OF CARE:  Care Discussed with Consultants/Other Providers [Y/N]:  Prior or Outpatient Records Reviewed [Y/N]:   [facility] Division of Hospital Medicine  Cristobal Sutherland  Pager: [phone]      Patient is a 66y old  Male who presents with a chief complaint of COVID (+) with AMS and hypotension (16 Jan 2022 17:11)      SUBJECTIVE / OVERNIGHT EVENTS: Run of NSVT in the evening. Refusing to go to CT. Denies any symptoms in AM.  ADDITIONAL REVIEW OF SYSTEMS: No chest pain, nausea, vomiting, diarrhea, abd pain, dysuria.     MEDICATIONS  (STANDING):  baclofen 5 milliGRAM(s) Oral every 8 hours  chlorhexidine 2% Cloths 1 Application(s) Topical <User Schedule>  cholecalciferol 1000 Unit(s) Oral daily  dextrose 50% Injectable 25 milliLiter(s) IV Push every 15 minutes  dextrose 50% Injectable 50 milliLiter(s) IV Push every 15 minutes  magnesium oxide 400 milliGRAM(s) Oral three times a day with meals  methimazole 10 milliGRAM(s) Oral daily  metoclopramide 10 milliGRAM(s) Oral every 8 hours  metoprolol succinate ER 50 milliGRAM(s) Oral at bedtime  metoprolol succinate ER 25 milliGRAM(s) Oral daily  mexiletine 150 milliGRAM(s) Oral every 8 hours  mirtazapine 7.5 milliGRAM(s) Oral at bedtime  multivitamin 1 Tablet(s) Oral daily  pantoprazole    Tablet 40 milliGRAM(s) Oral every 12 hours  piperacillin/tazobactam IVPB.. 3.375 Gram(s) IV Intermittent every 8 hours  potassium phosphate / sodium phosphate Tablet (K-PHOS No. 2) 1 Tablet(s) Oral two times a day  sertraline 100 milliGRAM(s) Oral daily  sodium chloride 1 Gram(s) Oral every 8 hours  sodium chloride 0.9% lock flush 3 milliLiter(s) IV Push every 8 hours  spironolactone 25 milliGRAM(s) Oral daily  vancomycin    Solution 125 milliGRAM(s) Oral every 12 hours    MEDICATIONS  (PRN):  acetaminophen     Tablet .. 650 milliGRAM(s) Oral every 6 hours PRN Temp greater or equal to 38C (100.4F), Mild Pain (1 - 3), Moderate Pain (4 - 6)      CAPILLARY BLOOD GLUCOSE        I&O's Summary    16 Jan 2022 07:01  -  17 Jan 2022 07:00  --------------------------------------------------------  IN: 560 mL / OUT: 1300 mL / NET: -740 mL    17 Jan 2022 07:01  -  17 Jan 2022 09:32  --------------------------------------------------------  IN: 0 mL / OUT: 150 mL / NET: -150 mL        PHYSICAL EXAM:  Vital Signs Last 24 Hrs  T(C): 36.9 (17 Jan 2022 08:30), Max: 36.9 (17 Jan 2022 04:32)  T(F): 98.4 (17 Jan 2022 08:30), Max: 98.4 (17 Jan 2022 04:32)  HR: 92 (17 Jan 2022 08:30) (84 - 92)  BP: 91/71 (17 Jan 2022 04:32) (91/71 - 92/74)  BP(mean): 73 (17 Jan 2022 08:30) (73 - 78)  RR: 18 (17 Jan 2022 08:30) (17 - 19)  SpO2: 97% (17 Jan 2022 08:30) (97% - 99%)  CONSTITUTIONAL: NAD, well-developed, well-groomed  EYES: PERRLA; conjunctiva and sclera clear  ENMT: Moist oral mucosa, no pharyngeal injection or exudates  NECK: Supple  RESPIRATORY: Normal respiratory effort; lungs are clear to auscultation bilaterally  CARDIOVASCULAR: mechanical heart sounds; No lower extremity edema; Peripheral pulses are 2+ bilaterally  ABDOMEN: Nontender to palpation, normoactive bowel sounds, no rebound/guarding; No hepatosplenomegaly  MUSCULOSKELETAL:  no clubbing or cyanosis of digits; no joint swelling or tenderness to palpation  PSYCH: A+O to person, place, and time; affect appropriate  NEUROLOGY: no focal deficits  SKIN: No rashes; no palpable lesions; LVAD in place    LABS:                        8.6    10.76 )-----------( 199      ( 16 Jan 2022 09:37 )             27.3     01-16    128<L>  |  95<L>  |  11  ----------------------------<  140<H>  3.8   |  23  |  0.93    Ca    9.6      16 Jan 2022 09:37  Phos  3.3     01-16  Mg     2.0     01-16    TPro  8.8<H>  /  Alb  3.3  /  TBili  0.3  /  DBili  x   /  AST  22  /  ALT  16  /  AlkPhos  158<H>  01-16              Culture - Blood (collected 14 Jan 2022 10:22)  Source: .Blood Blood  Preliminary Report (15 Norman 2022 11:01):    No growth to date.    Culture - Blood (collected 14 Jan 2022 10:22)  Source: .Blood Blood  Preliminary Report (15 Norman 2022 11:01):    No growth to date.        RADIOLOGY & ADDITIONAL TESTS:  Results Reviewed:   Imaging Personally Reviewed:  Electrocardiogram Personally Reviewed:    COORDINATION OF CARE:  Care Discussed with Consultants/Other Providers [Y/N]:  Prior or Outpatient Records Reviewed [Y/N]:

## 2022-01-17 NOTE — PROGRESS NOTE ADULT - PROBLEM SELECTOR PLAN 4
-hydralazine discontinued  -c/w spironolactone 25 mg QD  -c/w metoprolol succinate 50 mg Qhs and 25mg qam   -replete Mg to 2-2.5, K 4-4.5  -Daily PT  -Encourage oral intake and nutrition, salt tabs  -EP eval as pt having multiple runs of wide complex tachycardia- mexitil 150mg q8 added   - NSVT overnight, per cards, will cont to monitor

## 2022-01-18 NOTE — PROGRESS NOTE ADULT - PROBLEM SELECTOR PLAN 2
COVID PCR + 12/13. Recent CXR clear, respiratory status stable on room air; s/p monoclonal antibodies Regeneron, s/p 5 day course of Remdesivir, s/p 10 day course of Dexamethasone  -COVID negative 1/3/2022

## 2022-01-18 NOTE — PROGRESS NOTE ADULT - PROBLEM SELECTOR PLAN 2
- leukocytosis is overall stable, remains afebrile  - hx of Serratia bacteremia, bood cultures 1/14 NGTD  - currently on IV zosyn   - ID following  - continue with Vanco PO for C. Diff ppx   - CT C/A/P pending

## 2022-01-18 NOTE — PROGRESS NOTE ADULT - SUBJECTIVE AND OBJECTIVE BOX
Subjective: Patient seen and examined resting in bed.     Medications:  acetaminophen     Tablet .. 650 milliGRAM(s) Oral every 6 hours PRN  baclofen 5 milliGRAM(s) Oral every 8 hours  chlorhexidine 2% Cloths 1 Application(s) Topical <User Schedule>  cholecalciferol 1000 Unit(s) Oral daily  dextrose 50% Injectable 50 milliLiter(s) IV Push every 15 minutes  dextrose 50% Injectable 25 milliLiter(s) IV Push every 15 minutes  magnesium oxide 400 milliGRAM(s) Oral three times a day with meals  methimazole 10 milliGRAM(s) Oral daily  metoclopramide 10 milliGRAM(s) Oral every 8 hours  metoprolol succinate ER 50 milliGRAM(s) Oral at bedtime  metoprolol succinate ER 25 milliGRAM(s) Oral daily  mexiletine 150 milliGRAM(s) Oral every 8 hours  mirtazapine 7.5 milliGRAM(s) Oral at bedtime  multivitamin 1 Tablet(s) Oral daily  pantoprazole    Tablet 40 milliGRAM(s) Oral every 12 hours  piperacillin/tazobactam IVPB.. 3.375 Gram(s) IV Intermittent every 8 hours  potassium phosphate / sodium phosphate Tablet (K-PHOS No. 2) 1 Tablet(s) Oral two times a day  sertraline 100 milliGRAM(s) Oral daily  sodium chloride 1 Gram(s) Oral every 8 hours  sodium chloride 0.9% lock flush 3 milliLiter(s) IV Push every 8 hours  spironolactone 25 milliGRAM(s) Oral daily  vancomycin    Solution 125 milliGRAM(s) Oral every 12 hours      Physical Exam:    Vitals:  Vital Signs Last 24 Hours  T(C): 36.7 (22 @ 12:00), Max: 37 (22 @ 02:11)  HR: 85 (22 @ 12:00) (84 - 104)  BP: 87/61 (22 @ 12:00) (87/61 - 92/63)  RR: 18 (22 @ 12:00) (18 - 18)  SpO2: 98% (22 @ 12:00) (98% - 98%)    Weight in k.4 ( @ 07:00)    I&O's Summary    2022 07:01  -  2022 07:00  --------------------------------------------------------  IN: 520 mL / OUT: 860 mL / NET: -340 mL    2022 07:01  -  2022 13:14  --------------------------------------------------------  IN: 0 mL / OUT: 200 mL / NET: -200 mL        Physical Exam  General: No distress. Comfortable.  HEENT: EOM intact.  Neck: Neck supple. JVP not elevated. No masses  Chest: Clear to auscultation bilaterally  CV: +VAD hum  Abdomen: Soft, non-distended, tender in RUQ and Epigastric region, +biliary drain   Neurology: Alert and oriented     LVAD Interrogation  VAD TYPE HM 2  Speed 9200  Flow 3.9  Power 4.9  PI  3.7  Assessment of driveline exit site: driveline dressing c/d/i  Programming changes: no changes made    Labs:                        9.0    10.27 )-----------( 208      ( 2022 08:31 )             28.3         134<L>  |  98  |  14  ----------------------------<  87  4.3   |  24  |  0.88    Ca    9.4      2022 08:31  Phos  3.5       Mg     1.9         TPro  8.6<H>  /  Alb  3.2<L>  /  TBili  0.3  /  DBili  x   /  AST  23  /  ALT  17  /  AlkPhos  168<H>

## 2022-01-18 NOTE — PROGRESS NOTE ADULT - SUBJECTIVE AND OBJECTIVE BOX
Yasmeen Arrieta MD  Division of Hospital Medicine  Pager [phone], If no response/off hours [phone]    Patient is a 66y old  Male who presents with a chief complaint of COVID (+) with AMS and hypotension (18 Jan 2022 13:12)        SUBJECTIVE / OVERNIGHT EVENTS:  overnight no events  pt intermittently refusing meds and imaging  agreeable for CT today which he had been declining this weekend  states he overall feels unwell and points to his lower abd reporting pain  no n/v/f/chills, cp    CAPILLARY BLOOD GLUCOSE        I&O's Summary    17 Jan 2022 07:01  -  18 Jan 2022 07:00  --------------------------------------------------------  IN: 520 mL / OUT: 860 mL / NET: -340 mL    18 Jan 2022 07:01  -  18 Jan 2022 16:16  --------------------------------------------------------  IN: 0 mL / OUT: 200 mL / NET: -200 mL      Vital Signs Last 24 Hrs  T(C): 36.7 (18 Jan 2022 12:00), Max: 37 (18 Jan 2022 02:11)  T(F): 98.1 (18 Jan 2022 12:00), Max: 98.6 (18 Jan 2022 02:11)  HR: 85 (18 Jan 2022 12:00) (84 - 104)  BP: 87/61 (18 Jan 2022 12:00) (87/61 - 92/63)  BP(mean): 74 (18 Jan 2022 10:00) (72 - 82)  RR: 18 (18 Jan 2022 12:00) (18 - 18)  SpO2: 98% (18 Jan 2022 12:00) (98% - 98%)    PHYSICAL EXAM:  GENERAL:  chronically ill appearing, in NAD  HEAD:  NCAT  EYES: PERRLA, conjunctiva clear  NECK: Supple, No JVD  CHEST/LUNG: CTA B/L.   HEART: Reg rate. Normal S1, S2. LVAD in place  ABDOMEN: Soft, mild TTP lower abd, no rebound/guarding  r perc dawn tube w/ yellow output  EXTREMITIES:  2+ Peripheral Pulses, No clubbing, cyanosis, edema.  PSYCH: flat affect  SKIN: No rashes or lesions    LABS:                        9.0    10.27 )-----------( 208      ( 18 Jan 2022 08:31 )             28.3     01-18    134<L>  |  98  |  14  ----------------------------<  87  4.3   |  24  |  0.88    Ca    9.4      18 Jan 2022 08:31  Phos  3.5     01-18  Mg     1.9     01-18    TPro  8.6<H>  /  Alb  3.2<L>  /  TBili  0.3  /  DBili  x   /  AST  23  /  ALT  17  /  AlkPhos  168<H>  01-18      RADIOLOGY & ADDITIONAL TESTS:  Consultant(s) Notes Reviewed:  CHF  Care Discussed with Consultants/Other Providers: Floor NP/PA    MEDICATIONS  (STANDING):  baclofen 5 milliGRAM(s) Oral every 8 hours  chlorhexidine 2% Cloths 1 Application(s) Topical <User Schedule>  cholecalciferol 1000 Unit(s) Oral daily  dextrose 50% Injectable 25 milliLiter(s) IV Push every 15 minutes  dextrose 50% Injectable 50 milliLiter(s) IV Push every 15 minutes  magnesium oxide 400 milliGRAM(s) Oral three times a day with meals  methimazole 10 milliGRAM(s) Oral daily  metoclopramide 10 milliGRAM(s) Oral every 8 hours  metoprolol succinate ER 50 milliGRAM(s) Oral at bedtime  metoprolol succinate ER 25 milliGRAM(s) Oral daily  mexiletine 150 milliGRAM(s) Oral every 8 hours  mirtazapine 7.5 milliGRAM(s) Oral at bedtime  multivitamin 1 Tablet(s) Oral daily  pantoprazole    Tablet 40 milliGRAM(s) Oral every 12 hours  piperacillin/tazobactam IVPB.. 3.375 Gram(s) IV Intermittent every 8 hours  potassium phosphate / sodium phosphate Tablet (K-PHOS No. 2) 1 Tablet(s) Oral two times a day  sertraline 100 milliGRAM(s) Oral daily  sodium chloride 1 Gram(s) Oral every 8 hours  sodium chloride 0.9% lock flush 3 milliLiter(s) IV Push every 8 hours  spironolactone 25 milliGRAM(s) Oral daily  vancomycin    Solution 125 milliGRAM(s) Oral every 12 hours    MEDICATIONS  (PRN):  acetaminophen     Tablet .. 650 milliGRAM(s) Oral every 6 hours PRN Temp greater or equal to 38C (100.4F), Mild Pain (1 - 3), Moderate Pain (4 - 6)

## 2022-01-18 NOTE — PROGRESS NOTE ADULT - PROBLEM SELECTOR PLAN 1
History of recurrent pneumonia and bacteremia; serratia in blood in October 2021  -PO vanc 125mg BID for C. diff ppx  -no longer candidate for bactrim as not tolerated by pt  -blood cx 1/11 + serratia on IV zosyn 1/12-- repeat bcx 1/14 NGTD  - follow up CT C/A/P results - pt went down for today  - TTE 1/13 with no vegetations or abscesses, consider COREY if bacteremia persists  - trend wbc and temp curve  - f/u ID recs

## 2022-01-18 NOTE — PROGRESS NOTE ADULT - PROBLEM SELECTOR PLAN 9
-Diet: Regular  -DVT: scd given hx of multiple GIB  -Dispo: IV ABX, f/u CTAP, Pending LVAD accepting facility when medically optimized

## 2022-01-18 NOTE — PROGRESS NOTE ADULT - SUBJECTIVE AND OBJECTIVE BOX
INFECTIOUS DISEASES FOLLOW UP-- Anitra Prater  [phone]    This is a follow up note for this  66yMale with  Sepsis  reports feeling well        ROS:  CONSTITUTIONAL:  No fever, good appetite  CARDIOVASCULAR:  No chest pain or palpitations  RESPIRATORY:  No dyspnea  GASTROINTESTINAL:  No nausea, vomiting, diarrhea, or abdominal pain  GENITOURINARY:  No dysuria  NEUROLOGIC:  No headache,     Allergies    Amiodarone Hydrochloride (Other (Severe))    Intolerances        ANTIBIOTICS/RELEVANT:  antimicrobials  piperacillin/tazobactam IVPB.. 3.375 Gram(s) IV Intermittent every 8 hours  vancomycin    Solution 125 milliGRAM(s) Oral every 12 hours    immunologic:    OTHER:  acetaminophen     Tablet .. 650 milliGRAM(s) Oral every 6 hours PRN  baclofen 5 milliGRAM(s) Oral every 8 hours  chlorhexidine 2% Cloths 1 Application(s) Topical <User Schedule>  cholecalciferol 1000 Unit(s) Oral daily  dextrose 50% Injectable 25 milliLiter(s) IV Push every 15 minutes  dextrose 50% Injectable 50 milliLiter(s) IV Push every 15 minutes  magnesium oxide 400 milliGRAM(s) Oral three times a day with meals  methimazole 10 milliGRAM(s) Oral daily  metoclopramide 10 milliGRAM(s) Oral every 8 hours  metoprolol succinate ER 50 milliGRAM(s) Oral at bedtime  metoprolol succinate ER 25 milliGRAM(s) Oral daily  mexiletine 150 milliGRAM(s) Oral every 8 hours  mirtazapine 7.5 milliGRAM(s) Oral at bedtime  multivitamin 1 Tablet(s) Oral daily  pantoprazole    Tablet 40 milliGRAM(s) Oral every 12 hours  potassium phosphate / sodium phosphate Tablet (K-PHOS No. 2) 1 Tablet(s) Oral two times a day  sertraline 100 milliGRAM(s) Oral daily  sodium chloride 1 Gram(s) Oral every 8 hours  sodium chloride 0.9% lock flush 3 milliLiter(s) IV Push every 8 hours  spironolactone 25 milliGRAM(s) Oral daily      Objective:  Vital Signs Last 24 Hrs  T(C): 36.7 (18 Jan 2022 12:00), Max: 37 (18 Jan 2022 02:11)  T(F): 98.1 (18 Jan 2022 12:00), Max: 98.6 (18 Jan 2022 02:11)  HR: 85 (18 Jan 2022 12:00) (84 - 104)  BP: 87/61 (18 Jan 2022 12:00) (87/61 - 92/63)  BP(mean): 76 (18 Jan 2022 18:00) (72 - 82)  RR: 18 (18 Jan 2022 12:00) (18 - 18)  SpO2: 98% (18 Jan 2022 12:00) (98% - 98%)    PHYSICAL EXAM:  Constitutional:no acute distress  Eyes:ALONSO, EOMI  Ear/Nose/Throat: no oral lesions, 	  Respiratory: clear BL  Cardiovascular: S1S2 LVAD sounds  Gastrointestinal:soft, (+) BS, no tenderness  bilious cholecystotomy tube drainage  Extremities:no e/e/c  No Lymphadenopathy  IV sites not inflammed.    LABS:                        9.0    10.27 )-----------( 208      ( 18 Jan 2022 08:31 )             28.3     01-18    134<L>  |  98  |  14  ----------------------------<  87  4.3   |  24  |  0.88    Ca    9.4      18 Jan 2022 08:31  Phos  3.5     01-18  Mg     1.9     01-18    TPro  8.6<H>  /  Alb  3.2<L>  /  TBili  0.3  /  DBili  x   /  AST  23  /  ALT  17  /  AlkPhos  168<H>  01-18          MICROBIOLOGY:            RECENT CULTURES:  01-14 @ 10:22  .Blood Blood  --  --  --    No growth to date.  --  01-13 @ 22:53  .Blood Blood  --  --  --    No growth to date.  --  01-11 @ 23:02  .Blood Blood-Venous  Blood Culture PCR  Serratia marcescens  Blood Culture PCR  PCR    Growth in aerobic and anaerobic bottles: Serratia marcescens  ***Blood Panel PCR results on this specimen are available  approximately 3 hours after the Gram stain result.***  Gram stain, PCR, and/or culture results may not always  correspond due to difference in methodologies.  ************************************************************  This PCR assay was performed by multiplex PCR. This  Assay tests for 66 bacterial and resistance gene targets.  Please refer to the [facility] test directory  at [email or web address] for details.  --      RADIOLOGY & ADDITIONAL STUDIES:    < from: CT Head No Cont (01.12.22 @ 11:47) >    IMPRESSION:  NO EVIDENCE OF AN ACUTE INTRACRANIAL HEMORRHAGE, MIDLINE SHIFT, OR   HYDROCEPHALUS. ATROPHY WITH PATCHY WHITE MATTER ISCHEMIC CHANGES. NO   SIGNIFICANT INTERVAL CHANGE    < end of copied text >

## 2022-01-18 NOTE — PROGRESS NOTE ADULT - PROBLEM SELECTOR PLAN 8
- recently has started to have a decline in his appetite  - nutrition following and appreciate recommendations  - continue with supplement  - may need to consider stating appetite stimulating supplement

## 2022-01-18 NOTE — CONSULT NOTE ADULT - SUBJECTIVE AND OBJECTIVE BOX
Behavioral Cardiology Psychological Assessment     History of present illness: Mr. Quispe is a 66 year-old man with history of NICM s/p HM2 LVAD (9/2017) as DT (due to severe PAD) with TV ring, multiple GI bleeds, thus maintained off AC, CKD 3prior COVID-19 infection in 4/2020, recurrent syncope post LVAD s/p ILR, s/p prolonged complex hospitalization from 6/14-11/16/2021 initially admitted with abdominal pain complicated by GIB, respiratory failure s/p trach, multiple infections, s/p cholecystotomy tube and SMA stent 10/2021, ultimately discharged to [facility] and then returned to [facility] for trach decannulation 12/2 who now presents with recurrent COVID-19 infection, initially in distributive shock. 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 His main issue at this time is his malnutrition for which supplements have been added to his diet.     Social history: , lives in his sister’s house in [place]. Has 3 sons (2 live in , 1 in [facility]). Oldest son (Kang Quispe) lives in [place]. Not close with his children. One of 3 siblings. Lives in his sister’s house in [place] (Cristina Rudolph [phone]), also in the home are niece [facility] [phone]) and nephew (Miller Rudolph).  Another sister lives close by in [place] and all other siblings live in [place] which the family visit often. Prior to health issues, worked full time at [employer] in [place], stopped working due to heart disease. Education: high school graduate.   Substance use:   ·	Tobacco: Denies. Former smoker, 8-10 cigarettes/day for 30 years, quit prior to LVAD 2017.  ·	Alcohol: Past use of 3-4 drinks of Johnson 2x/week. None since health problems began.   ·	Drug: Denies any drug use.     Past psychiatric history: Denies any inpatient psychiatric hospitalizations, denies s/a.     Psychological assessment: Seen resting in bed on 2DSU. Mood neutral. Minimally talkative but pleasant and cooperative. Engaged throughout assessment but distracted by staff attempting to start IV. Answered questions appropriately but minimally talkative. Frequently nodded yes/no to questions. Denies depression. Denies anxiety. Affect appropriate given current health issues, complex and extended hospitalizations. Reports he has no appetite. When asked whether it was due to food choices, shook his head no. Provided opportunity to order whatever foods he prefers but shook head no. Nodded yes to stomach pain but did not verify when asked again at end of assessment. Denies issues with sleep. Permission given by patient for this writer to speak with his nephew, Miller Rudolph, ([phone]). Left voicemail for nephew requesting he call this writer.     Mental status exam: Seen resting in bed. Awake, oriented x3-4. Mouthing words or nodding yes/no to questions. Thought process appears goal oriented. No evidence of any psychosis, kenan, delusions. No SI/HI. Mood neutral. Denies current symptoms of anxiety/depression. Insight and judgment adequate.     Impression: Mr. Quispe is a 66-year-old man s/p LVAD (2017), with history of prolonged complex hospitalization from 6/14-11/16/2021, ultimately discharged to [facility] rehab and then returned to [facility] for trach decannulation 12/2 who now presents with recurrent COVID-19 infection. During assessment, engaged but minimally talkative. Poor appetite. As per staff not eating meals. Initially stated yes to abdominal discomfort but did not verify when asked later. No evidence of delirium. Denies depressed mood.  With Mr. Quispe's permission, left voicemail for his nephew, Miller Rudolph at [phone] requesting he call this writer.     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 F06. 31 Systolic heart failure I50.2  LVAD Z95.811    Recommendations:   Engage with patient as often as possible throughout day to encourage communication  Mr. Quispe is denying depressed mood and not interested in medication at this time, but will continue to evaluate  May benefit from GI consult   May benefit from Palliative consult to discuss his goals of care  As Creole is patient’s primary language, would benefit from all information being explained using  services     Behavioral Cardiology will follow

## 2022-01-18 NOTE — CHART NOTE - NSCHARTNOTEFT_GEN_A_CORE
MEDICINE PA EPISODIC NOTE    Notified by RN of pt. having inappropriate pacing on tele. Upon chart review, pt. does not have any Hx of PPM placement. Pt. has known ILR which was placed. Case d/w cardiology fellow Dr. Carr, tele event likely artifact. Pt. seen and examined at bedside, denies lightheadedness, dizziness, HA, blurry vision, vision changes, CP, palpitations, difficulty breathing. VSS. Will continue to monitor and endorse to day team in AM.     Shilpa Carcamo PA-C  Dept. of Medicine  Spectra [phone]

## 2022-01-18 NOTE — PROGRESS NOTE ADULT - PROBLEM SELECTOR PLAN 4
-c/w spironolactone 25 mg QD  -c/w metoprolol succinate 50 mg Qhs and 25mg qam   -replete Mg to 2-2.5, K 4-4.5  -Encourage oral intake and nutrition  -on salt tabs for hyponatremia - monitor volume status  -EP following given multiple runs of wide complex tachycardia- mexitil 150mg q8 added

## 2022-01-18 NOTE — PROGRESS NOTE ADULT - PROBLEM SELECTOR PLAN 7
+TPO ab. Unable to get a thyroid US due to his trach.   -continue methimazole 10mg QD per endo  -FT4 and TSH wnl

## 2022-01-18 NOTE — PROGRESS NOTE ADULT - ATTENDING COMMENTS
Remains very ill without exit strategy.   No VAD issues.  Continue supportive care.  Pending placement - rehab. Itraconazole Counseling:  I discussed with the patient the risks of itraconazole including but not limited to liver damage, nausea/vomiting, neuropathy, and severe allergy.  The patient understands that this medication is best absorbed when taken with acidic beverages such as non-diet cola or ginger ale.  The patient understands that monitoring is required including baseline LFTs and repeat LFTs at intervals.  The patient understands that they are to contact us or the primary physician if concerning signs are noted.

## 2022-01-18 NOTE — PROGRESS NOTE ADULT - ASSESSMENT
67 y/o M with a history of Stage D NICM s/p HM2 LVAD in 9/2017 at  (due to severe PAD) with TV ring, multiple GI bleeds, thus maintained off AC, CKD 3prior COVID-19 infection in 4/2020, recurrent syncope post LVAD s/p ILR, s/p prolonged complex hospitalization from 6/14-11/16/2021 initially admitted with abdominal pain complicated by GIB, respiratory failure s/p trach, multiple infections, s/p cholecystotomy tube and SMA stent 10/2021, ultimately discharged to [facility] rehab and then returned to [facility] for trach decannulation 12/2 who now presents with recurrent COVID-19 infection, initially in distributive shock. 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     He had elevated inflammatory markers which are now improving. He received monoclonal antibodies and was started on remdesivir and dexamethasone. He's afebrile with BCx from 12/18 and 12/30 NGTD. He had an episode of hypoxia noted 1/1, possibly in the setting of aspiration. He is currently saturating well on RA. He had runs of MMVT in setting of hypokalemia and being off his beta blocker which has since resolved. He currently appears well compensated from HF perspective. His LDH is stable. His LVAD is functioning well without s/s of pump malfunction.     Recently he was noted to have positive blood culture for serratia marcescens, currently on IV antibiotics and his most recent cultures are NGTD. His Hgb has overall remain stable though he is off anticoagulation/ antiplatelets.  His main issue at this time is his malnutrition for which supplements have been added to his diet.

## 2022-01-18 NOTE — PROVIDER CONTACT NOTE (OTHER) - ACTION/TREATMENT ORDERED:
Shilpa Carcamo (PA) made aware, no acute interventions at this time. Shilpa Carcamo (PA) made aware, ordered stat BMP, mag, and phos.

## 2022-01-18 NOTE — PROGRESS NOTE ADULT - ASSESSMENT
66yo man with a history of VAD palcement, history of COVID infeciton in April 2020, history of a prolonged hospitalization in 2021 with recurrent sepsis, pneumonia, intubated/trach x2 cycles , chronic gall bladder disease s/p perc dawn, SMA ischemia reuiring a stent placement, cachecxia who was discharged to rehab but is presented from [facility] on 12/13 with AMS, hypotensive, tachycardic found to be COVID (+). Pt is lethargic and mumbling words required ICU admission and pressor support but recovered and is off oxygen on general cardiac floor    Recurrent GNR bacteremia/serratia:  stable on Zosyn therapy  plan 14 days total      COVID infection-   confirmed second bout of COVID by documented PCR testing  Ideally, would be interested in sequencing the virus to determine strain (omicron vs delta)  Received a dose of monoclonal antibodies Regener[employer] product   Completed remdesivir therapy  Completed ten days of dexamethasone  Refuses most vaccines- but will need to wait three months  to receive COVID vaccine series post monoclonal  Will administer flu/pneumonia vaccines this admission prior to discharge if he will permit      Source of serratia not entirely clear- differential GI translocation, biliary tree, lungs, other including LVAD  blood cultures clear  serratia sensitive to Zosyn continue present therapy  plan 14 days      Prior severe C.diff infection   pre-emptive oral Vanco 125mg bid      Morris Prater MD  [phone]  After 5pm/weekends [phone]

## 2022-01-18 NOTE — PROGRESS NOTE ADULT - ASSESSMENT
64 yo M PMH LVAD placement, history of COVID infection in April 2020, history of a prolonged hospitalization in 2021 with recurrent sepsis, pneumonia, intubated/trach x2 cycles, chronic gall bladder disease s/p perc dawn, SMA ischemia s/p stent, cachexia who was discharged to rehab but is presented from [facility] on 12/13 with septic shock 2/2 COVID (+) S/P pressor support now titrated off and downgraded to the floor. Hosp course c/b serratia bacteremia on iv zosyn, aspiration pna, MMVT due to hypokalemia.

## 2022-01-18 NOTE — PROGRESS NOTE ADULT - PROBLEM SELECTOR PLAN 5
- history of thyrotoxicosis with amio, on methimazole  - EP following, continue Mexiletine 150 mg PO TID   - continue BB as above  - unable to uptitrate BB due to MAPs, and unable to use amio   - Electrolyte repletion to maintain K 4-4.5 and Mg 2-2.5

## 2022-01-19 NOTE — PROGRESS NOTE ADULT - PROBLEM SELECTOR PLAN 1
History of recurrent pneumonia and bacteremia; serratia in blood in October 2021. no longer candidate for bactrim as not tolerated by pt  -PO vanc 125mg BID for C. diff ppx  -blood cx 1/11 + serratia on IV zosyn 1/12 for anticipated 2 week course through 1/25  - repeat bcx 1/14 NGTD  - TTE 1/13 with no vegetations or abscesses, consider COREY if bacteremia persists  - trend wbc and temp curve  - CT A/P showing splenic abscess 4cm in size - see section below for management  - f/u ID recs

## 2022-01-19 NOTE — PROGRESS NOTE ADULT - PROBLEM SELECTOR PLAN 5
-c/w spironolactone 25 mg QD  -c/w metoprolol succinate 50 mg Qhs and 25mg qam - cannot titrate up given low BP  -replete Mg to 2-2.5, K 4-4.5  -on salt tabs for hyponatremia - monitor volume status  -EP following given multiple runs of wide complex tachycardia- c/w mexitil 150mg q8  -overall grim prognosis - pall re-consult for GOC. psychiatry consult r/o depression.

## 2022-01-19 NOTE — CONSULT NOTE ADULT - SUBJECTIVE AND OBJECTIVE BOX
Interventional Radiology    Evaluate for Procedure: splenic collection drainage    HPI: 64 yo M PMH LVAD placement, history of COVID infection in April 2020, history of a prolonged hospitalization in 2021 with recurrent sepsis, pneumonia, intubated/trach x2 cycles, chronic gall bladder disease s/p perc dawn, SMA ischemia s/p stent, cachexia who was discharged to rehab but is presented from [facility] on 12/13 with septic shock 2/2 COVID (+) S/P pressor support now titrated off and downgraded to the floor. Hosp course c/b serratia bacteremia on iv zosyn, aspiration pna, MMVT due to hypokalemia, and now CT finding of splenic abscess.    Allergies: Amiodarone Hydrochloride (Other (Severe))    Medications (Abx/Cardiac/Anticoagulation/Blood Products)    metoprolol succinate ER: 50 milliGRAM(s) Oral (01-18 @ 21:50)  metoprolol succinate ER: 25 milliGRAM(s) Oral (01-19 @ 06:19)  mexiletine: 150 milliGRAM(s) Oral (01-19 @ 12:28)  piperacillin/tazobactam IVPB..: 25 mL/Hr IV Intermittent (01-19 @ 12:30)    Data:    T(C): 37.4  HR: 100  BP: 80/61  RR: 18  SpO2: 100%    -WBC 14.56 / HgB 8.9 / Hct 27.9 / Plt 210  -Na 129 / Cl 94 / BUN 12 / Glucose 99  -K 4.1 / CO2 24 / Cr 0.95  -ALT -- / Alk Phos -- / T.Bili --  -INR 1.09 / PTT 30.1      Radiology: CT from today reviewed with Dr. Lundy    Assessment/Plan: 64 yo M with complicated hospital course and recurrent bacteremia (now cleared as of 1/13), found to have 4 cm splenic collection concerning for abscess.    -- Risks of draining splenic abscess include bleeding and pleural transgression which may lead to empyema and/or pneumothorax  --As patient is not acutely ill at the moment with cleared Bcx, would hold off for now  --If patient were to clinically deteriorate, please let IR know  --Recommend surgery consultation

## 2022-01-19 NOTE — PROGRESS NOTE ADULT - SUBJECTIVE AND OBJECTIVE BOX
Subjective: Patient seen and examined resting in bed. Overnight had 21 beats over WCT    Medications:  acetaminophen     Tablet .. 650 milliGRAM(s) Oral every 6 hours PRN  baclofen 5 milliGRAM(s) Oral every 8 hours  chlorhexidine 2% Cloths 1 Application(s) Topical <User Schedule>  cholecalciferol 1000 Unit(s) Oral daily  dextrose 50% Injectable 25 milliLiter(s) IV Push every 15 minutes  dextrose 50% Injectable 50 milliLiter(s) IV Push every 15 minutes  magnesium oxide 400 milliGRAM(s) Oral three times a day with meals  methimazole 10 milliGRAM(s) Oral daily  metoclopramide 10 milliGRAM(s) Oral every 8 hours  metoprolol succinate ER 50 milliGRAM(s) Oral at bedtime  metoprolol succinate ER 25 milliGRAM(s) Oral daily  mexiletine 150 milliGRAM(s) Oral every 8 hours  mirtazapine 7.5 milliGRAM(s) Oral at bedtime  multivitamin 1 Tablet(s) Oral daily  pantoprazole    Tablet 40 milliGRAM(s) Oral every 12 hours  piperacillin/tazobactam IVPB.. 3.375 Gram(s) IV Intermittent every 8 hours  potassium phosphate / sodium phosphate Tablet (K-PHOS No. 2) 1 Tablet(s) Oral two times a day  sertraline 100 milliGRAM(s) Oral daily  sodium chloride 1 Gram(s) Oral every 8 hours  sodium chloride 0.9% lock flush 3 milliLiter(s) IV Push every 8 hours  spironolactone 25 milliGRAM(s) Oral daily  vancomycin    Solution 125 milliGRAM(s) Oral every 12 hours      Vitals:  Vital Signs Last 24 Hours  T(C): 37.4 (01-19-22 @ 06:20), Max: 37.4 (01-19-22 @ 06:20)  HR: 100 (01-19-22 @ 06:20) (82 - 100)  BP: 80/61 (01-18-22 @ 20:18) (80/61 - 87/61)  RR: 18 (01-18-22 @ 20:18) (18 - 18)  SpO2: 98% (01-18-22 @ 20:18) (98% - 98%)        I&O's Summary    18 Jan 2022 07:01  -  19 Jan 2022 07:00  --------------------------------------------------------  IN: 0 mL / OUT: 575 mL / NET: -575 mL        Physical Exam  General: No distress. Comfortable.  HEENT: EOM intact.  Neck: Neck supple. JVP not elevated. No masses  Chest: Clear to auscultation bilaterally  CV: +VAD hum  Abdomen: Soft, non-distended, tenderness noted over epigastric/ RUQ, +bilary drain   Neurology: Alert and oriented     LVAD Interrogation  VAD TYPE HM 2  Speed 9200  Flow 5  Power 5.9  PI  5.7  Assessment of driveline exit site: driveline exit site c/d/i  Programming changes: no changes made    Labs:                        9.0    10.27 )-----------( 208      ( 18 Jan 2022 08:31 )             28.3     01-19    129<L>  |  96  |  13  ----------------------------<  116<H>  3.9   |  22  |  0.90    Ca    9.3      19 Jan 2022 00:53  Phos  3.2     01-19  Mg     1.9     01-19    TPro  8.6<H>  /  Alb  3.2<L>  /  TBili  0.3  /  DBili  x   /  AST  23  /  ALT  17  /  AlkPhos  168<H>  01-18               Subjective: Patient seen and examined resting in bed. Overnight had 21 beats over WCT    Medications:  acetaminophen     Tablet .. 650 milliGRAM(s) Oral every 6 hours PRN  baclofen 5 milliGRAM(s) Oral every 8 hours  chlorhexidine 2% Cloths 1 Application(s) Topical <User Schedule>  cholecalciferol 1000 Unit(s) Oral daily  dextrose 50% Injectable 25 milliLiter(s) IV Push every 15 minutes  dextrose 50% Injectable 50 milliLiter(s) IV Push every 15 minutes  magnesium oxide 400 milliGRAM(s) Oral three times a day with meals  methimazole 10 milliGRAM(s) Oral daily  metoclopramide 10 milliGRAM(s) Oral every 8 hours  metoprolol succinate ER 50 milliGRAM(s) Oral at bedtime  metoprolol succinate ER 25 milliGRAM(s) Oral daily  mexiletine 150 milliGRAM(s) Oral every 8 hours  mirtazapine 7.5 milliGRAM(s) Oral at bedtime  multivitamin 1 Tablet(s) Oral daily  pantoprazole    Tablet 40 milliGRAM(s) Oral every 12 hours  piperacillin/tazobactam IVPB.. 3.375 Gram(s) IV Intermittent every 8 hours  potassium phosphate / sodium phosphate Tablet (K-PHOS No. 2) 1 Tablet(s) Oral two times a day  sertraline 100 milliGRAM(s) Oral daily  sodium chloride 1 Gram(s) Oral every 8 hours  sodium chloride 0.9% lock flush 3 milliLiter(s) IV Push every 8 hours  spironolactone 25 milliGRAM(s) Oral daily  vancomycin    Solution 125 milliGRAM(s) Oral every 12 hours      Vitals:  Vital Signs Last 24 Hours  T(C): 37.4 (01-19-22 @ 06:20), Max: 37.4 (01-19-22 @ 06:20)  HR: 100 (01-19-22 @ 06:20) (82 - 100)  BP: 80/61 (01-18-22 @ 20:18) (80/61 - 87/61)  RR: 18 (01-18-22 @ 20:18) (18 - 18)  SpO2: 98% (01-18-22 @ 20:18) (98% - 98%)        I&O's Summary    18 Jan 2022 07:01  -  19 Jan 2022 07:00  --------------------------------------------------------  IN: 0 mL / OUT: 575 mL / NET: -575 mL        Physical Exam  General: frail resting in bed   HEENT: EOM intact.  Neck: Neck supple. JVP not elevated. No masses  Chest: Clear to auscultation bilaterally  CV: +VAD hum  Abdomen: Soft, non-distended, tenderness noted over epigastric/ RUQ, +bilary drain   Neurology: Alert and oriented     LVAD Interrogation  VAD TYPE HM 2  Speed 9200  Flow 5  Power 5.9  PI  5.7  Assessment of driveline exit site: driveline exit site c/d/i  Programming changes: no changes made    Labs:                        9.0    10.27 )-----------( 208      ( 18 Jan 2022 08:31 )             28.3     01-19    129<L>  |  96  |  13  ----------------------------<  116<H>  3.9   |  22  |  0.90    Ca    9.3      19 Jan 2022 00:53  Phos  3.2     01-19  Mg     1.9     01-19    TPro  8.6<H>  /  Alb  3.2<L>  /  TBili  0.3  /  DBili  x   /  AST  23  /  ALT  17  /  AlkPhos  168<H>  01-18

## 2022-01-19 NOTE — PROGRESS NOTE ADULT - ASSESSMENT
66 yo M PMH LVAD placement, history of COVID infection in April 2020, history of a prolonged hospitalization in 2021 with recurrent sepsis, pneumonia, intubated/trach x2 cycles, chronic gall bladder disease s/p perc dawn, SMA ischemia s/p stent, cachexia who was discharged to rehab but is presented from [facility] on 12/13 with septic shock 2/2 COVID (+) S/P pressor support now titrated off and downgraded to the floor. Hosp course c/b serratia bacteremia on iv zosyn, aspiration pna, MMVT due to hypokalemia, and now CT finding of splenic abscess.

## 2022-01-19 NOTE — PROGRESS NOTE ADULT - PROBLEM SELECTOR PLAN 2
CT w/ possible splenic abscess 4 cm in size  - IR c/s for drainage  - IV abx as above  - pain control - tylenol prn, added tramadol 25 mg po prn, c/w bowel regimen

## 2022-01-19 NOTE — PROGRESS NOTE ADULT - ASSESSMENT
65 y/o M with a history of Stage D NICM s/p HM2 LVAD in 9/2017 at  (due to severe PAD) with TV ring, multiple GI bleeds, thus maintained off AC, CKD 3prior COVID-19 infection in 4/2020, recurrent syncope post LVAD s/p ILR, s/p prolonged complex hospitalization from 6/14-11/16/2021 initially admitted with abdominal pain complicated by GIB, respiratory failure s/p trach, multiple infections, s/p cholecystotomy tube and SMA stent 10/2021, ultimately discharged to [facility] rehab and then returned to [facility] for trach decannulation 12/2 who now presents with recurrent COVID-19 infection, initially in distributive shock. 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     He had elevated inflammatory markers which are now improving. He received monoclonal antibodies and was started on remdesivir and dexamethasone. He's afebrile with BCx from 12/18 and 12/30 NGTD. He had an episode of hypoxia noted 1/1, possibly in the setting of aspiration. He is currently saturating well on RA. He had runs of MMVT in setting of hypokalemia and being off his beta blocker which has since resolved. He currently appears well compensated from HF perspective. His LDH is stable. His LVAD is functioning well without s/s of pump malfunction.     Recently he was noted to have positive blood culture for serratia marcescens, currently on IV antibiotics. His Hgb has overall remain stable though he is off anticoagulation/ antiplatelets. He now has a decline in his appetite and is noted to have worsening abdominal pain. His prognosis is guarded, palliative care was consulted.

## 2022-01-19 NOTE — PROGRESS NOTE ADULT - PROBLEM SELECTOR PLAN 10
-Diet: Regular  -DVT: scd given hx of multiple GIB  -Dispo: IV ABX, IR eval for splenic abscess drainage, psych eval r/o depression, palliative reconsult for GOC    overall guarded prognosis  Would need LVAD accepting facility when medically optimized

## 2022-01-19 NOTE — PROGRESS NOTE ADULT - ASSESSMENT
64yo man with a history of VAD palcement, history of COVID infeciton in April 2020, history of a prolonged hospitalization in 2021 with recurrent sepsis, pneumonia, intubated/trach x2 cycles , chronic gall bladder disease s/p perc dawn, SMA ischemia reuiring a stent placement, cachecxia who was discharged to rehab but is presented from [facility] on 12/13 with AMS, hypotensive, tachycardic found to be COVID (+). Pt is lethargic and mumbling words required ICU admission and pressor support but recovered and is off oxygen on general cardiac floor    Recurrent GNR bacteremia/serratia:  stable on Zosyn therapy  plan 14 days total      COVID infection-   confirmed second bout of COVID by documented PCR testing  Ideally, would be interested in sequencing the virus to determine strain (omicron vs delta)  Received a dose of monoclonal antibodies Regener[employer] product   Completed remdesivir therapy  Completed ten days of dexamethasone  Refuses most vaccines- but will need to wait three months  to receive COVID vaccine series post monoclonal  Will administer flu/pneumonia vaccines this admission prior to discharge if he will permit      Recurrent serratia bacteremia  blood cultures clear  serratia sensitive to Zosyn continue present therapy    Likely splenic abscess 4cm collection seen on abdominal CT scan:  Likely embolic in nature  IR consult noted - risks of drainage may outweigh benefits  Patient has cleared bacteremia on current antibiotic but may recur once antibiotics are stopped  Family discussion for next steps to be organized      Prior severe C.diff infection   pre-emptive oral Vanco 125mg bid      Morris Prater MD  [phone]  After 5pm/weekends [phone]

## 2022-01-19 NOTE — PROGRESS NOTE ADULT - PROBLEM SELECTOR PLAN 2
- his WBC is starting to rise despite being on abx, remains afebrile  - hx of Serratia bacteremia, bood cultures 1/14 NGTD  - currently on IV zosyn   - ID following  - continue with Vanco PO for C. Diff ppx   - CT C/A/P pending - his WBC is starting to rise despite being on abx, remains afebrile  - hx of Serratia bacteremia, blood cultures 1/14 NGTD  - currently on IV zosyn   - ID following  - continue with Vanco PO for C. Diff ppx   - CT C/A/P pending

## 2022-01-19 NOTE — PROGRESS NOTE ADULT - PROBLEM SELECTOR PLAN 4
- continue with Toprol XL to 25mg qAM and 50 mg qPM (hold for MAP less than 70)  - continue spironolactone 25 mg daily  - Daily PT  - his overall prognosis is guarded, palliative care was reconsulted today  - will need to schedule family meeting to discuss GOC

## 2022-01-19 NOTE — PROGRESS NOTE ADULT - PROBLEM SELECTOR PLAN 8
- recently has started to have a decline in his appetite  - nutrition following and appreciate recommendations  - continue with supplement  - KUB pending, last BM recorded on 1/12  - please place on standing bowel reg  - his overall prognosis is guarded, palliative care consulted

## 2022-01-19 NOTE — PROGRESS NOTE ADULT - SUBJECTIVE AND OBJECTIVE BOX
Yasmeen Arrieta MD  Division of Hospital Medicine  Pager [phone], If no response/off hours [phone]    Patient is a 66y old  Male who presents with a chief complaint of COVID (+) with AMS and hypotension (19 Jan 2022 07:26)        SUBJECTIVE / OVERNIGHT EVENTS:  overnight no events - TELE w/ 21B WCT  states continues to have some lower abd pain  Denies issues with bms  no n/v/f/chills, cp, sob      CAPILLARY BLOOD GLUCOSE        I&O's Summary    18 Jan 2022 07:01  -  19 Jan 2022 07:00  --------------------------------------------------------  IN: 0 mL / OUT: 575 mL / NET: -575 mL    19 Jan 2022 07:01  -  19 Jan 2022 16:19  --------------------------------------------------------  IN: 360 mL / OUT: 300 mL / NET: 60 mL      Vital Signs Last 24 Hrs  T(C): 37.4 (19 Jan 2022 06:20), Max: 37.4 (19 Jan 2022 06:20)  T(F): 99.3 (19 Jan 2022 06:20), Max: 99.3 (19 Jan 2022 06:20)  HR: 100 (19 Jan 2022 06:20) (82 - 100)  BP: 80/61 (18 Jan 2022 20:18) (80/61 - 80/61)  BP(mean): 72 (19 Jan 2022 15:00) (70 - 82)  RR: 18 (19 Jan 2022 13:56) (18 - 18)  SpO2: 100% (19 Jan 2022 13:56) (98% - 100%)    PHYSICAL EXAM:  GENERAL:  chronically ill appearing, in NAD  HEAD:  NCAT  EYES: PERRLA, conjunctiva clear  NECK: Supple, No JVD  CHEST/LUNG: CTA B/L.   HEART: Reg rate. Normal S1, S2. LVAD in place  ABDOMEN: Soft, mild TTP lower abd, no rebound/guarding  r perc dawn tube w/ yellow output  EXTREMITIES:  2+ Peripheral Pulses, No clubbing, cyanosis, edema.  PSYCH: flat affect  SKIN: No rashes or lesions    LABS:                        8.9    14.56 )-----------( 210      ( 19 Jan 2022 09:43 )             27.9     01-19    129<L>  |  94<L>  |  12  ----------------------------<  99  4.1   |  24  |  0.95    Ca    9.7      19 Jan 2022 09:41  Phos  3.2     01-19  Mg     1.9     01-19    TPro  8.6<H>  /  Alb  3.2<L>  /  TBili  0.3  /  DBili  x   /  AST  23  /  ALT  17  /  AlkPhos  168<H>  01-18                RADIOLOGY & ADDITIONAL TESTS:  Consultant(s) Notes Reviewed:  chf, psych, ID  Care Discussed with Consultants/Other Providers: Floor NP/PA    MEDICATIONS  (STANDING):  baclofen 5 milliGRAM(s) Oral every 8 hours  chlorhexidine 2% Cloths 1 Application(s) Topical <User Schedule>  cholecalciferol 1000 Unit(s) Oral daily  dextrose 50% Injectable 25 milliLiter(s) IV Push every 15 minutes  dextrose 50% Injectable 50 milliLiter(s) IV Push every 15 minutes  magnesium oxide 400 milliGRAM(s) Oral three times a day with meals  methimazole 10 milliGRAM(s) Oral daily  metoclopramide 10 milliGRAM(s) Oral every 8 hours  metoprolol succinate ER 50 milliGRAM(s) Oral at bedtime  metoprolol succinate ER 25 milliGRAM(s) Oral daily  mexiletine 150 milliGRAM(s) Oral every 8 hours  mirtazapine 7.5 milliGRAM(s) Oral at bedtime  multivitamin 1 Tablet(s) Oral daily  pantoprazole    Tablet 40 milliGRAM(s) Oral every 12 hours  piperacillin/tazobactam IVPB.. 3.375 Gram(s) IV Intermittent every 8 hours  polyethylene glycol 3350 17 Gram(s) Oral every 12 hours  potassium phosphate / sodium phosphate Tablet (K-PHOS No. 2) 1 Tablet(s) Oral two times a day  sertraline 100 milliGRAM(s) Oral daily  sodium chloride 1 Gram(s) Oral every 8 hours  sodium chloride 0.9% lock flush 3 milliLiter(s) IV Push every 8 hours  spironolactone 25 milliGRAM(s) Oral daily  vancomycin    Solution 125 milliGRAM(s) Oral every 12 hours    MEDICATIONS  (PRN):  acetaminophen     Tablet .. 650 milliGRAM(s) Oral every 6 hours PRN Temp greater or equal to 38C (100.4F), Mild Pain (1 - 3), Moderate Pain (4 - 6)

## 2022-01-19 NOTE — PROGRESS NOTE ADULT - SUBJECTIVE AND OBJECTIVE BOX
INFECTIOUS DISEASES FOLLOW UP-- Anitra Prater  [phone]    This is a follow up note for this  66yMale with  Sepsis  serratia bacteremia        ROS:  CONSTITUTIONAL:  awake, alert, pleasant but does not want to poked    Allergies    Amiodarone Hydrochloride (Other (Severe))    Intolerances        ANTIBIOTICS/RELEVANT:  antimicrobials  piperacillin/tazobactam IVPB.. 3.375 Gram(s) IV Intermittent every 8 hours  vancomycin    Solution 125 milliGRAM(s) Oral every 12 hours    immunologic:    OTHER:  acetaminophen     Tablet .. 650 milliGRAM(s) Oral every 6 hours PRN  baclofen 5 milliGRAM(s) Oral every 8 hours  chlorhexidine 2% Cloths 1 Application(s) Topical <User Schedule>  cholecalciferol 1000 Unit(s) Oral daily  dextrose 50% Injectable 25 milliLiter(s) IV Push every 15 minutes  dextrose 50% Injectable 50 milliLiter(s) IV Push every 15 minutes  magnesium oxide 400 milliGRAM(s) Oral three times a day with meals  methimazole 10 milliGRAM(s) Oral daily  metoclopramide 10 milliGRAM(s) Oral every 8 hours  metoprolol succinate ER 50 milliGRAM(s) Oral at bedtime  metoprolol succinate ER 25 milliGRAM(s) Oral daily  mexiletine 150 milliGRAM(s) Oral every 8 hours  mirtazapine 7.5 milliGRAM(s) Oral at bedtime  multivitamin 1 Tablet(s) Oral daily  pantoprazole    Tablet 40 milliGRAM(s) Oral every 12 hours  polyethylene glycol 3350 17 Gram(s) Oral every 12 hours  potassium phosphate / sodium phosphate Tablet (K-PHOS No. 2) 1 Tablet(s) Oral two times a day  sertraline 100 milliGRAM(s) Oral daily  sodium chloride 1 Gram(s) Oral every 8 hours  sodium chloride 0.9% lock flush 3 milliLiter(s) IV Push every 8 hours  spironolactone 25 milliGRAM(s) Oral daily  traMADol 25 milliGRAM(s) Oral every 6 hours PRN      Objective:  Vital Signs Last 24 Hrs  T(C): 37.4 (19 Jan 2022 06:20), Max: 37.4 (19 Jan 2022 06:20)  T(F): 99.3 (19 Jan 2022 06:20), Max: 99.3 (19 Jan 2022 06:20)  HR: 100 (19 Jan 2022 06:20) (82 - 100)  BP: 80/61 (18 Jan 2022 20:18) (80/61 - 80/61)  BP(mean): 72 (19 Jan 2022 15:00) (70 - 82)  RR: 18 (19 Jan 2022 13:56) (18 - 18)  SpO2: 100% (19 Jan 2022 13:56) (98% - 100%)    PHYSICAL EXAM:  Constitutional:no acute distress  Eyes:ALONSO, EOMI  Ear/Nose/Throat: no oral lesions, 	  Respiratory: clear BL  Cardiovascular: S1S2 VAD sounds  Gastrointestinal:soft, (+) BS, no tenderness  Extremities:no e/e/c  No Lymphadenopathy  IV sites not inflammed.    LABS:                        8.9    14.56 )-----------( 210      ( 19 Jan 2022 09:43 )             27.9     01-19    129<L>  |  94<L>  |  12  ----------------------------<  99  4.1   |  24  |  0.95    Ca    9.7      19 Jan 2022 09:41  Phos  3.2     01-19  Mg     1.9     01-19    TPro  8.6<H>  /  Alb  3.2<L>  /  TBili  0.3  /  DBili  x   /  AST  23  /  ALT  17  /  AlkPhos  168<H>  01-18          MICROBIOLOGY:    Culture - Blood (01.11.22 @ 23:02)    -  Amikacin: S <=16    -  Ampicillin: R >16 These ampicillin results predict results for amoxicillin    -  Ampicillin/Sulbactam: R >16/8 Enterobacter, Klebsiella aerogenes, Citrobacter, and Serratia may develop resistance during prolonged therapy (3-4 days)    -  Aztreonam: S <=4    -  Cefazolin: R >16 Enterobacter, Klebsiella aerogenes, Citrobacter, and Serratia may develop resistance during prolonged therapy (3-4 days)    -  Cefepime: S <=2    -  Cefoxitin: R >16    -  Ceftriaxone: S <=1 Enterobacter, Klebsiella aerogenes, Citrobacter, and Serratia may develop resistance during prolonged therapy    -  Ciprofloxacin: R >2    -  Ertapenem: S <=0.5    -  Gentamicin: S <=2    -  Levofloxacin: R 2    -  Meropenem: S <=1    -  Piperacillin/Tazobactam: S <=8    -  Tobramycin: S <=2    Gram Stain:   Growth in aerobic bottle: Gram Negative Rods  Growth in anaerobic bottle: Gram Negative Rods    -  Trimethoprim/Sulfamethoxazole: S 2/38    -  Serratia marcescens: Detec    Specimen Source: .Blood Blood-Venous    Organism: Blood Culture PCR    Organism: Serratia marcescens    Culture Results:   Growth in aerobic and anaerobic bottles: Serratia marcescens  ***Blood Panel PCR results on this specimen are available  approximately 3 hours after the Gram stain result.***  Gram stain, PCR, and/or culture results may not always  correspond due to difference in methodologies.  ************************************************************  This PCR assay was performed by multiplex PCR. This  Assay tests for 66 bacterial and resistance gene targets.  Please refer to the [facility] test directory  at [email or web address] for details.    Organism Identification: Blood Culture PCR  Serratia marcescens    Method Type: CLAU    Method Type: PCR            RECENT CULTURES:  01-14 @ 10:22  .Blood Blood  --  --  --    No Growth Final  --  01-13 @ 22:53  .Blood Blood  --  --  --    No Growth Final  --      RADIOLOGY & ADDITIONAL STUDIES:    < from: CT Abdomen and Pelvis w/ IV Cont (01.19.22 @ 13:47) >  IMPRESSION:  *  Status post LVAD. No evidence of fluid collection adjacent to the LVAD.  *  Hypoenhancing lesion in the spleen measuring 4.0 cm, new compared to   prior examination 10/5/2021. In the appropriate clinical setting, splenic   abscess may be considered.    < end of copied text >

## 2022-01-19 NOTE — CHART NOTE - NSCHARTNOTEFT_GEN_A_CORE
Nutrition Follow Up Note  Patient seen for: Malnutrition follow-up    Chart reviewed, events noted. Per chart: 66M, PMH of Stage D NICM s/p HM2 LVAD (2017), multiple GI bleeds (thus maintained off AC), CKD 3, prior COVID-19 infection (2020), s/p prolonged complex hospitalization from -2021 initially admitted with abdominal pain c/b GIB, respiratory failure s/p trach, multiple infections, s/p cholecystotomy tube and SMA stent 10/2021, ultimately discharged to [facility] rehab and then returned to [facility] for trach decannulation  who now presents with recurrent COVID-19 infection in shock. 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  He had an episode of hypoxia noted , possibly in the setting of aspiration.     Source: EMR, Team, Pt    Diet, Consistent Carbohydrate w/Evening Snack:   Supplement Feeding Modality:  Oral  Glucerna Shake Cans or Servings Per Day:  6       Frequency:  Daily (22 @ 08:13)    Is current diet order appropriate/adequate? [x] Yes  []  No: however, pt has been with prolonged suboptimal PO intake, consuming <25% at meals. When spoke with pt he said he was "not in the mood to eat" - this is noted to be a recurrent issue with patient. RD provided many options for pt and he declined all, however was amenable to RD changing Glucerna shakes from vanilla to strawberry. Spoke with RN to continue encouraging PO intake at meals/snacks. Discussed with team if within GOC would consider additional appetite stimulants and may also consider initiating EN in setting of prolonged suboptimal PO intake.     Nutrition-Related Events:  - Prolonged hyponatremia - NaCl tabs ordered daily   - Multivitamin, MagOx, and Vitamin D3 supplementation ordered daily  - MBS 1/3 with SLP recommendations for "Regular diet and thin liquids"  - Swallow eval  with SLP recommendations for "Regular solids and thin liquids"  - Remeron ordered since admit   - Upon prior hospitalization was with EN for prolonged period and then advanced to PO diet but was with some dysphagia.    GI:  Last documented BM .   Bowel Regimen? [x] Yes (miralax)  [] No  - Noted on abx course at this time  - Reglan ordered since admit  for gut motility    Daily Weights in K.4 (), 52.8 (), 52.9 (), 52.8 (01-15), 66.5 (), 68.5 (), 49.2 (01-10), 51.1 (), 52 (), 53.8 (), 51.3 (), 50.1 (), 54.6 (), 55.7 (12-15)  - Noted weight has downtrended since admit; likely multifactorial as pt has been receiving aldactone as well as with suboptimal PO intake at times. Will continue to monitor/trend weight status     Drug Dosing Weight  Weight (kg): 55.7 ()  BMI (kg/m2): 16.7 ()  IBW: 178 Ibs/80.7 kg    MEDICATIONS  (STANDING):  cholecalciferol  dextrose 50% Injectable  dextrose 50% Injectable  magnesium oxide  methimazole  metoclopramide  metoprolol succinate ER  metoprolol succinate ER  mexiletine  multivitamin  pantoprazole    Tablet  polyethylene glycol 3350  potassium phosphate / sodium phosphate Tablet (K-PHOS No. 2)  sodium chloride  sodium chloride 0.9% lock flush  spironolactone  vancomycin    Solution    Pertinent Labs:  @ 09:41: Na 129<L>, BUN 12, Cr 0.95, BG 99, K+ 4.1, Phos --, Mg --, Alk Phos --, ALT/SGPT --, AST/SGOT --, HbA1c --   @ 00:53: Na 129<L>, BUN 13, Cr 0.90, <H>, K+ 3.9, Phos 3.2, Mg 1.9, Alk Phos --, ALT/SGPT --, AST/SGOT --, HbA1c --    A1C with Estimated Average Glucose Result: 5.4 % (08-15-21 @ 13:52)    Finger Sticks:    Skin per nursing documentation: no pressure injuries noted  Edema: none    Estimated Nutritional Needs  Based on 12/15 weight: 122.7 lb/55.6 kg  Energy Needs (30-35 kcals/kg): 2090-7871  Protein Needs (1.5-1.8 g/kg): 83.4-100.08    Previous Nutrition Diagnosis: Severe-chronic Malnutrition & Underweight  Nutrition Diagnosis is: [x] ongoing - addressed with PO diet, oral nutrition and micronutrient supplementation    New Nutrition Diagnosis: [x] Not applicable    Nutrition Care Plan:  [x] In Progress  [] Achieved  [] Not applicable    Recommendations:      1. Continue Current diet as ordered with Glucerna Shake  - RD to continue providing Magic Cup and dietary preferences as feasible   2. Continue micronutrient supplementation as ordered; recommend thiamin supplementation in setting of prolonged suboptimal PO intake - Pt at risk for refeeding syndrome.  3. May consider additional appetite stimulants if within GOC; at discretion of team  4.  Pending need for EN (if within GOC) would recommend initiating Jevity 1.2 @10ml/hr x 24hrs; RD to follow-up and reassess pending EN initiation as pt at high risk for developing refeeding syndrome  5. Continue to provide assistance as necessary with encouragement of PO intake at meals/snacks  6. Pt without documented BM x1 week - may consider more aggressive bowel regimen at discretion of team.    Monitoring and Evaluation:   Continue to monitor nutritional intake, tolerance to diet prescription, weights, labs, skin integrity  RD remains available upon request and will follow up per protocol    Wendy Travis, MS, RD, CDN, [facility]  pager #[phone]

## 2022-01-19 NOTE — PROGRESS NOTE ADULT - ATTENDING COMMENTS
Remains debilitated and with poor nutrition. As per nursing staff he stated he doesn't want any further interventions done.   He continues to c/o abdominal pain. No BMs documented in the past 2 days. Abdomen is soft.   Recommend KUB.  Will plan for palliative+family meeting to readdress goals of care.   Poor prognosis.

## 2022-01-19 NOTE — PHARMACOTHERAPY INTERVENTION NOTE - COMMENTS
Patient is a 66 year old man with a Serratia marcesens bacteremia receiving Zosyn 3.375G Q8H. He started on 1/12 (Currently day 8) and per ID, she is scheduled for a 14 day course (note was written 1/18). Before the patient was on Levofloxacin 500mg PO QD (1/5 - 1/12), but cultures showed the S. marcesans were resistant. Her zosyn order is scheduled to fall of today (1/19) at around midnight. Recommend re-ordering Zosyn 3.375G Q8H for continued care and monitor for discontinuation at the discretion of ID     Dayne Lopez, PharmD  PGY-1 Pharmacy Resident   Teams preferred Patient is a 66 year old man with a Serratia marcesens bacteremia receiving Zosyn 3.375G Q8H. He started on 1/12 (Currently day 8) and per ID, she is scheduled for a 14 day course. Patient was previously on Levofloxacin 500mg PO daily (1/5 - 1/12), but cultures showed the S. marcesans was resistant. Her zosyn order is scheduled to fall of today (1/19) at around midnight. Recommend re-ordering Zosyn 3.375G Q8H for continued therapy.    Dayne Lopez, PharmD  PGY-1 Pharmacy Resident   Teams preferred Patient is a 66 year old man with Serratia marcesens bacteremia receiving Zosyn 3.375G Q8H. Therapy was started on 1/12 (currently day 8) and per ID, he is scheduled for a 14 day course. Patient was previously on levofloxacin 500 mg PO daily (1/5 - 1/12), but cultures showed the S. marcesans was resistant. His Zosyn order is scheduled to fall off today (1/19) at around midnight. Recommend re-ordering Zosyn 3.375G Q8H for continued therapy.    Dayne Lopez, PharmD  PGY-1 Pharmacy Resident   Teams preferred

## 2022-01-20 NOTE — PROGRESS NOTE ADULT - PROBLEM SELECTOR PLAN 2
- his WBC is starting to rise despite being on abx, remains afebrile  - hx of Serratia bacteremia, blood cultures 1/14 NGTD  - currently on IV zosyn   - ID following  - continue with Vanco PO for C. Diff ppx   - CT C/A/P 1/19 shows hypoenhancing lesion in the spleen measuring 4.0 cm.   - IR consulted, as the patient is not acutely ill at the moment with cleared Bcx, holding off on intervention for now

## 2022-01-20 NOTE — PROVIDER CONTACT NOTE (MEDICATION) - SITUATION
Pt refusing all night time medications including metoprolol and mexitil. Pt educated on importance of medication, however pt continues to refuse despite multiple attempts. Pt only agreeable to take IV zosyn.

## 2022-01-20 NOTE — PROGRESS NOTE ADULT - SUBJECTIVE AND OBJECTIVE BOX
Behavioral Cardiology Progress Note   Utilized Creole : ID [id number]    History of present illness: Mr. Quispe is a 66 year-old man with history of NICM s/p HM2 LVAD (9/2017) as DT (due to severe PAD) with TV ring, multiple GI bleeds, thus maintained off AC, CKD 3prior COVID-19 infection in 4/2020, recurrent syncope post LVAD s/p ILR, s/p prolonged complex hospitalization from 6/14-11/16/2021 initially admitted with abdominal pain complicated by GIB, respiratory failure s/p trach, multiple infections, s/p cholecystotomy tube and SMA stent 10/2021, ultimately discharged to [facility] and then returned to [facility] for trach decannulation 12/2 who now presents with recurrent COVID-19 infection, initially in distributive shock. 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 His main issue at this time is his malnutrition for which supplements have been added to his diet.     Social history: , lives in his sister’s house in [place]. Has 3 sons (2 live in , 1 in [facility] [place] [facility]). Oldest son (Kang Quispe) lives in [place]. Not close with his children. One of 3 siblings. Lives in his sister’s house in [place] (Cristina Rudolph [phone]), also in the home are niece (Celestina Rudolph[place] [phone]) and nephew (Miller Rudolph).  Another sister lives close by in [place] and all other siblings live in [place] which the family visit often. Prior to health issues, worked full time at [employer] in [place], stopped working due to heart disease. Education: high school graduate.   Substance use:   ·	Tobacco: Denies. Former smoker, 8-10 cigarettes/day for 30 years, quit prior to LVAD 2017.  ·	Alcohol: Past use of 3-4 drinks of Johnson 2x/week. None since health problems began.   ·	Drug: Denies any drug use.

## 2022-01-20 NOTE — BH CONSULTATION LIAISON ASSESSMENT NOTE - NSBHCHARTREVIEWLAB_PSY_A_CORE FT
8.5    12.46 )-----------( 192      ( 20 Jan 2022 05:40 )             26.6     01-20    128<L>  |  95<L>  |  12  ----------------------------<  102<H>  4.1   |  22  |  1.00    Ca    9.3      20 Jan 2022 05:39  Phos  3.2     01-19  Mg     1.9     01-19

## 2022-01-20 NOTE — PROGRESS NOTE ADULT - ASSESSMENT
66 yo M PMH LVAD placement, history of COVID infection in April 2020, history of a prolonged hospitalization in 2021 with recurrent sepsis, pneumonia, intubated/trach x2 cycles, chronic gall bladder disease s/p perc dawn, SMA ischemia s/p stent, cachexia who was discharged to rehab but is presented from [facility] on 12/13 with septic shock 2/2 COVID (+) S/P pressor support now titrated off and downgraded to the floor. Hosp course c/b serratia bacteremia on iv zosyn through 1/25, aspiration pna, MMVT due to hypokalemia, and now CT finding of splenic abscess medically managing.

## 2022-01-20 NOTE — PROGRESS NOTE ADULT - ASSESSMENT
67 y/o M with a history of Stage D NICM s/p HM2 LVAD in 9/2017 at  (due to severe PAD) with TV ring, multiple GI bleeds, thus maintained off AC, CKD 3prior COVID-19 infection in 4/2020, recurrent syncope post LVAD s/p ILR, s/p prolonged complex hospitalization from 6/14-11/16/2021 initially admitted with abdominal pain complicated by GIB, respiratory failure s/p trach, multiple infections, s/p cholecystotomy tube and SMA stent 10/2021, ultimately discharged to [facility] rehab and then returned to [facility] for trach decannulation 12/2 who now presents with recurrent COVID-19 infection, initially in distributive shock. 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     He had elevated inflammatory markers which are now improving. He received monoclonal antibodies and was started on remdesivir and dexamethasone. He's afebrile with BCx from 12/18 and 12/30 NGTD. He had an episode of hypoxia noted 1/1, possibly in the setting of aspiration. He is currently saturating well on RA. He had runs of MMVT in setting of hypokalemia and being off his beta blocker which has since resolved. He currently appears well compensated from HF perspective. His LDH is stable. His LVAD is functioning well without s/s of pump malfunction.     Recently he was noted to have positive blood culture for serratia marcescens, currently on IV antibiotics. His Hgb continues to slowly drift down, remains off anticoagulation/ antiplatelets. He now has a decline in his appetite and is noted to have worsening abdominal pain. His prognosis is guarded, palliative care was consulted. Family meeting is schedule for Friday 1/21 @ 12:30pm to discuss GOC.  65 y/o M with a history of Stage D NICM s/p HM2 LVAD in 9/2017 at  (due to severe PAD) with TV ring, multiple GI bleeds, thus maintained off AC, CKD 3prior COVID-19 infection in 4/2020, recurrent syncope post LVAD s/p ILR, s/p prolonged complex hospitalization from 6/14-11/16/2021 initially admitted with abdominal pain complicated by GIB, respiratory failure s/p trach, multiple infections, s/p cholecystotomy tube and SMA stent 10/2021, ultimately discharged to [facility] rehab and then returned to [facility] for trach decannulation 12/2 who now presents with recurrent COVID-19 infection, initially in distributive shock. 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     He had elevated inflammatory markers which are now improving. He received monoclonal antibodies and was started on remdesivir and dexamethasone. He's afebrile with BCx from 12/18 and 12/30 NGTD. He had an episode of hypoxia noted 1/1, possibly in the setting of aspiration. He is currently saturating well on RA. He had runs of MMVT in setting of hypokalemia and being off his beta blocker which has since resolved. He currently appears well compensated from HF perspective. His LDH is stable. His LVAD is functioning well without s/s of pump malfunction.     Recently he was noted to have positive blood culture for serratia marcescens, currently on IV antibiotics. His Hgb continues to slowly drift down, remains off anticoagulation/ antiplatelets. He now has a decline in his appetite and is noted to have worsening abdominal pain. His prognosis is guarded, palliative care was consulted.

## 2022-01-20 NOTE — PROGRESS NOTE ADULT - SUBJECTIVE AND OBJECTIVE BOX
Subjective: Patient seen and examined resting in bed. Overnight refused medications.     Medications:  acetaminophen     Tablet .. 650 milliGRAM(s) Oral every 6 hours PRN  baclofen 5 milliGRAM(s) Oral every 8 hours  chlorhexidine 2% Cloths 1 Application(s) Topical <User Schedule>  cholecalciferol 1000 Unit(s) Oral daily  dextrose 50% Injectable 25 milliLiter(s) IV Push every 15 minutes  dextrose 50% Injectable 50 milliLiter(s) IV Push every 15 minutes  magnesium oxide 400 milliGRAM(s) Oral three times a day with meals  methimazole 10 milliGRAM(s) Oral daily  metoclopramide 10 milliGRAM(s) Oral every 8 hours  metoprolol succinate ER 50 milliGRAM(s) Oral at bedtime  metoprolol succinate ER 25 milliGRAM(s) Oral daily  mexiletine 150 milliGRAM(s) Oral every 8 hours  mirtazapine 7.5 milliGRAM(s) Oral at bedtime  multivitamin 1 Tablet(s) Oral daily  pantoprazole    Tablet 40 milliGRAM(s) Oral every 12 hours  piperacillin/tazobactam IVPB.. 3.375 Gram(s) IV Intermittent every 8 hours  polyethylene glycol 3350 17 Gram(s) Oral every 12 hours  potassium phosphate / sodium phosphate Tablet (K-PHOS No. 2) 1 Tablet(s) Oral two times a day  senna 2 Tablet(s) Oral at bedtime  sertraline 100 milliGRAM(s) Oral daily  sodium chloride 1 Gram(s) Oral every 8 hours  sodium chloride 0.9% lock flush 3 milliLiter(s) IV Push every 8 hours  spironolactone 25 milliGRAM(s) Oral daily  traMADol 25 milliGRAM(s) Oral every 6 hours PRN  vancomycin    Solution 125 milliGRAM(s) Oral every 12 hours      Vitals:  Vital Signs Last 24 Hours  T(C): 36.2 (01-20-22 @ 06:30), Max: 37.2 (01-19-22 @ 22:21)  HR: 97 (01-20-22 @ 06:30) (97 - 112)  BP: --  RR: 18 (01-20-22 @ 06:30) (18 - 18)  SpO2: 98% (01-20-22 @ 06:30) (98% - 100%)        I&O's Summary    19 Jan 2022 07:01  -  20 Jan 2022 07:00  --------------------------------------------------------  IN: 660 mL / OUT: 675 mL / NET: -15 mL        Physical Exam  General: No distress. Comfortable.  HEENT: EOM intact.  Neck: Neck supple. JVP not elevated. No masses  Chest: Clear to auscultation bilaterally  CV: +VAD hum.  Abdomen: Soft, non-distended, tenderness noted in epigastric and RUQ region, +biliary drain   Neurology: Alert and oriented    LVAD Interrogation  VAD TYPE HM 2  Speed 9200  Flow 5.5  Power 5.7  PI 3.5  Assessment of driveline exit site: driveline dressing c/d/i  Programming changes: no changes made    Labs:                        8.5    12.46 )-----------( 192      ( 20 Jan 2022 05:40 )             26.6     01-20    128<L>  |  95<L>  |  12  ----------------------------<  102<H>  4.1   |  22  |  1.00    Ca    9.3      20 Jan 2022 05:39  Phos  3.2     01-19  Mg     1.9     01-19    TPro  8.6<H>  /  Alb  3.2<L>  /  TBili  0.3  /  DBili  x   /  AST  23  /  ALT  17  /  AlkPhos  168<H>  01-18

## 2022-01-20 NOTE — PROGRESS NOTE ADULT - PROBLEM SELECTOR PLAN 5
-c/w CHF Dr Oneill  -c/w spironolactone 25 mg QD  -c/w metoprolol succinate 50 mg Qhs and 25mg qam - cannot titrate up given low BP  -replete Mg to 2-2.5, K 4-4.5  -on salt tabs for hyponatremia - monitor volume status  -EP following given multiple runs of wide complex tachycardia- c/w mexitil 150mg q8  -overall grim prognosis - palliative following, discussed w/ Dr Burgos plan to c/w symptom support  -psych following - c/w zoloft and remeron, added melatonin

## 2022-01-20 NOTE — PROGRESS NOTE ADULT - PROBLEM SELECTOR PLAN 4
resolved  - patient currently comfortable on RA  - MBS passed (1/3/22) = pt now on regular diet  - nutrition following

## 2022-01-20 NOTE — BH CONSULTATION LIAISON ASSESSMENT NOTE - NSBHCHARTREVIEWINVESTIGATE_PSY_A_CORE FT
< from: 12 Lead ECG (01.18.22 @ 21:10) >      Ventricular Rate 86 BPM    Atrial Rate 86 BPM    P-R Interval 140 ms    QRS Duration 122 ms    Q-T Interval 368 ms    QTC Calculation(Bazett) 440 ms    P Axis 79 degrees    R Axis -79 degrees    T Axis 88 degrees    Diagnosis Line NORMAL SINUS RHYTHM  POSSIBLE LEFT ATRIAL ENLARGEMENT  LEFT ANTERIOR FASCICULAR BLOCK  LEFT VENTRICULAR HYPERTROPHY WITH QRS WIDENING ( [facility] product )  NONSPECIFIC T WAVE ABNORMALITY  ABNORMAL ECG  WHEN COMPARED WITH ECG OF 18-JAN-2022 21:04, (UNCONFIRMED)  NO SIGNIFICANT CHANGE WAS FOUND  Confirmed by MD NATALYA, RYAN ([id number]) on 1/19/2022 3:15:10 PM    < end of copied text >

## 2022-01-20 NOTE — PROGRESS NOTE ADULT - CONVERSATION DETAILS
Though the patient was slightly confused (mainly about the time he has been in the hospital), he was able to be re-directed and f/u a discussion about his current issues (particularly lack of desire to eat and refusal of medications). He indicated he was frustrated with all the time he has spent in the hospital and that he was feeling as if things were not improving, "nothing is getting better." I indicated that his refusal to care was interpreted as him not wanting to continue to keep going. I then indicated that if that was his will that then we were willing to help him out to be comfortable since by refusing Rx his condition was just going to decline. However, that if his desire was to continue to prolong his life that he needed to eat and take the medication we were providing. He stated he was depressed and that hiccups were a major problem. He also c/o abdominal pain. He indicated he still he had motivations in life which were mainly associated to his family and to his desire to live. I then indicated that his acts were not reflecting that; therefore, he needed to collaborate with his care and f/u the recommendations providers were given and taking his medications as well as eating. I stated I was going to try to help him out with his hiccups, pain, and that I was going to also try to give him and appetite stimulant. I also indicated he was on antidepressants. He then said, "give me the food, I will eat it." I indicated I was going to adjust medications and f/u with him tomorrow.

## 2022-01-20 NOTE — PROGRESS NOTE ADULT - ATTENDING COMMENTS
Patient well known to me. Prolonged hospitalizations in setting of multiple complications. Main complain is chronic abdominal pain (~9 months) unchanged after multiple interventions including cholecystostomy tube, SMA stent, post pyloric tube feeds, etc. Other comorbidities include recurrent bacteremia, GI bleed, ?depression and poor appetite/malnutrition.   Poor prognosis in setting of multiple complications.  No active VAD issues.    Recommend palliative c/s for symptomatic treatment and comfort care.

## 2022-01-20 NOTE — PROGRESS NOTE ADULT - PROBLEM SELECTOR PLAN 2
CT w/ possible splenic abscess 4 cm in size  - IR consulted - risks of draining outweigh benefits soy as bacteremia resolved  - IV abx as above  - pain control - tylenol prn, added tramadol 25 mg po prn, c/w bowel regimen

## 2022-01-20 NOTE — BH CONSULTATION LIAISON ASSESSMENT NOTE - NSBHMSESPEECH_PSY_A_CORE
Patient's NG tube limits the patient's volume, but speech is otherwise within normal limits./Abnormal as indicated, otherwise normal...

## 2022-01-20 NOTE — BH CONSULTATION LIAISON ASSESSMENT NOTE - RISK ASSESSMENT
Static risk factors: male sex, age  Modifiable risk factors: chronic pain, current medical condition  Protective factors: Church, no known SA hx, no substance or alcohol use, social supports, family support Static risk factors: male sex, age  Modifiable risk factors: chronic pain, current medical conditions, social isolation  Protective factors: Congregation, no known SA hx, no substance or alcohol use, social supports, family support

## 2022-01-20 NOTE — PROGRESS NOTE ADULT - PROBLEM SELECTOR PLAN 4
- continue with Toprol XL to 25mg qAM and 50 mg qPM (hold for MAP less than 70)  - continue spironolactone 25 mg daily  - Daily PT  - his overall prognosis is guarded, palliative care was reconsulted today  - Family meeting schedule for Friday 1/21 @ 12:30pm - continue with Toprol XL to 25mg qAM and 50 mg qPM (hold for MAP less than 70)  - continue spironolactone 25 mg daily  - Daily PT  - his overall prognosis is guarded, palliative care was reconsulted   - Family meeting cancelled for tomorrow

## 2022-01-20 NOTE — PROGRESS NOTE ADULT - PROBLEM SELECTOR PLAN 2
2/2 to hiccups, adjustment disorder and likely depression.   Antidepressants as per Psych.   Will add Marinol 2.5mg hs.   Again, if his Splenic abscess is triggering his hiccups and this is causing an issues with his appetite, it may be worth to have a discussion about risks vs. benefits of IR drainage of it.

## 2022-01-20 NOTE — PROGRESS NOTE ADULT - PROBLEM SELECTOR PLAN 1
History of recurrent pneumonia and bacteremia; serratia in blood in October 2021. no longer candidate for bactrim as not tolerated by pt. TTE 1/13 with no vegetations or abscesses  - PO vanc 125mg BID for C. diff ppx  - blood cx 1/11 + serratia on IV zosyn 1/12 for anticipated 2 week course through 1/25  - repeat bcx 1/14 NGTD  - trend wbc and temp curve  - CT A/P showing splenic abscess 4cm in size - see section below for management

## 2022-01-20 NOTE — BH CONSULTATION LIAISON ASSESSMENT NOTE - SUMMARY
Patient is a 66 year-old  male, with 4 children, with no known psychiatry hx, no past inpatient psychiatric hospitalizations, with PMHx LVAD placement, history of COVID infection in April 2020, history of a prolonged hospitalization in 2021 with recurrent sepsis, pneumonia, intubated/trach x2 cycles, chronic gall bladder disease s/p perc dawn, SMA ischemia s/p stent, cachexia who was discharged to rehab but is presented from [facility] on 12/13 with septic shock 2/2 COVID (+) S/P pressor support now titrated off and downgraded to the floor. Hosp course c/b serratia bacteremia on iv zosyn, aspiration pna, MMVT due to hypokalemia, and now CT finding of splenic abscess.  Psychiatry consulted to assess for depression in the context of patient refusing medications. Patient is a 66 year-old  male, with 3 children, with no known psychiatry hx, no past inpatient psychiatric hospitalizations, with PMHx LVAD placement, history of COVID infection in April 2020, history of a prolonged hospitalization in 2021 with recurrent sepsis, pneumonia, intubated/trach x2 cycles, chronic gall bladder disease s/p perc dawn, SMA ischemia s/p stent, cachexia who was discharged to rehab but is presented from [facility] on 12/13 with septic shock 2/2 COVID (+) S/P pressor support now titrated off and downgraded to the floor. Hosp course c/b serratia bacteremia on iv zosyn, aspiration pna, MMVT due to hypokalemia, and now CT finding of splenic abscess.  Psychiatry consulted to assess for depression in the context of patient refusing medications. Patient is a 66 year-old  male, with 3 children, with no known psychiatry hx, no past inpatient psychiatric hospitalizations, with PMHx LVAD placement, history of COVID infection in April 2020, history of a prolonged hospitalization in 2021 with recurrent sepsis, pneumonia, intubated/trach x2 cycles, chronic gall bladder disease s/p perc dawn, SMA ischemia s/p stent, cachexia who was discharged to rehab but is presented from [facility] on 12/13 with septic shock 2/2 COVID (+) S/P pressor support now titrated off and downgraded to the floor. Hosp course c/b serratia bacteremia on iv zosyn, aspiration pna, MMVT due to hypokalemia, and now CT finding of splenic abscess.  Psychiatry consulted to assess for depression in the context of patient refusing medications.  Seen and evaluated, awake and alert, withdrawn, mostly nonverbal.  States he is depressed, but not able to elaborate on his sx, becomes tearful.  Patient not able to state the current year when given choices, reports he is  and has 4 children (however sister confirms patient is  and has 3 children).  Patient denies thoughts of ending his life, reports he has no problems sleeping.  Per collateral from sister, patient with no h/o depression, states she believes patient is depressed 2/2 isolation in the hospital, medical issues.  She reports last speaking to him 3 weeks ago on the phone and at that time was at his baseline, happy, conversive with her.  At this time, presentation consistent with delirium 2/2 general medical condition.  Would continue his Remeron and Zoloft for now, consider starting melatonin 3mg qHS for delirium.

## 2022-01-20 NOTE — BH CONSULTATION LIAISON ASSESSMENT NOTE - NSBHCONSULTRECOMMENDOTHER_PSY_A_CORE FT
start melatonin 3mg po qHS    can cont with Remeron and Zoloft for now     Minimize use of benzos, opioids, anticholinergics, or other deliriogenic agents when possible.  Maintain sleep wake cycle.  Provide frequent reorientation and redirection.  Family member at bedside if possible. Assess for need for glasses and hearing aid (if applicable), please assist patient with making phone call to family.

## 2022-01-20 NOTE — PROGRESS NOTE ADULT - PROBLEM SELECTOR PLAN 8
- recently has started to have a decline in his appetite  - nutrition following and appreciate recommendations  - continue with supplement  - KUB unremarkable, last recorded BM was on 1/12  - please place on standing bowel reg  - his overall prognosis is guarded, palliative care consulted

## 2022-01-20 NOTE — PROGRESS NOTE ADULT - PROBLEM SELECTOR PROBLEM 4
Yulissa is a 45 year old  who presents for her annual gynecological exam.  She Has no complaints today.  She reports that she wants to have another baby.  Had GDM with her 2nd baby, did not do the follow up for her 2hr.  She does state she checked a random blood sugar last week and it was 85.  She has regular menses.      Obstetric history:  # 1 - Date: 10/17/07, Sex: Male, Weight: 3 kg (6 lb 9.8 oz), GA: 40w0d, Delivery: , Unspecified, Apgar1: None, Apgar5: None, Living: Living, Birth Comments: None    # 2 - Date: 10/17/18, Sex: Female, Weight: None, GA: 40w0d, Delivery: , Unspecified, Apgar1: None, Apgar5: None, Living: Living, Birth Comments: None        History of abnormal pap:  no abnormalities      No past medical history on file.     Past Surgical History:   Procedure Laterality Date   •  section, low transverse          Current Outpatient Medications   Medication Sig Dispense Refill   • Prenatal Vit-Fe Fumarate-FA (Prenatal Vitamin) 27-0.8 MG Tab Take 1 tablet by mouth daily. 30 tablet 11     No current facility-administered medications for this visit.        ALLERGIES:  No Known Allergies     No family history on file.     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[employer]    Social Needs   • Financial resource strain: Not on file   • Food insecurity     Worry: Not on file     Inability: Not on file   • Transportation needs     Medical: Not on file     Non-medical: Not on file   Tobacco Use   • Smoking status: Never Smoker   • Smokeless tobacco: Never Used   Substance and Sexual Activity   • Alcohol use: Not Currently   • Drug use: Never   • Sexual activity: Yes     Partners: Male     Birth control/protection: None   Lifestyle   • Physical activity     Days per  week: Not on file     Minutes per session: Not on file   • Stress: Not on file   Relationships   • Social connections     Talks on phone: Not on file     Gets together: Not on file     Attends Tenriism service: Not on file     Active member of club or organization: Not on file     Attends meetings of clubs or organizations: Not on file     Relationship status:    • Intimate partner violence     Fear of current or ex partner: No     Emotionally abused: No     Physically abused: No     Forced sexual activity: No   Other Topics Concern   • Not on file   Social History Narrative   • Not on file          Review of Systems  Constitutional: Negative for fever, fatigue and unexpected weight change.  HENT: Negative for sore throat, mouth sores, trouble swallowing, sinus pressure and tinnitus.   Eyes: Negative for visual disturbance.  Respiratory: Negative for cough, shortness of breath and wheezing.   Cardiovascular: Negative for chest pain, palpitations and leg swelling.  Gastrointestinal: Negative for nausea, vomiting, abdominal pain, diarrhea, constipation and blood in stool.  Genitourinary: Negative for dysuria, urgency, frequency, hematuria, vaginal bleeding, vaginal discharge, difficulty urinating, genital sores, vaginal pain, menstrual problem, pelvic pain and dyspareunia.  Musculoskeletal: Negative for myalgias and joint swelling.  Skin: Negative for rash.  Neurological: Negative for dizziness, seizures, numbness and headaches.  Hematological: Does not bruise/bleed easily.  Psychiatric/Behavioral: Negative for sleep disturbance and dysphoric mood. The patient is not nervous/anxious.      Physical Exam  Visit Vitals  /70 (BP Location: RUE - Right upper extremity, Patient Position: Sitting, Cuff Size: Large Adult)   Temp 98.7 °F (37.1 °C) (Temporal)   Ht 5' 2.5\" (1.588 m)   Wt 85 kg (187 lb 7 oz)   LMP 02/24/2021   BMI 33.74 kg/m²     Constitutional: She is oriented to person, place, and time. She appears  well-developed and well-nourished.   HENT:   Head: Normocephalic and atraumatic.  Neck: Neck supple. No mass and no thyromegaly present.  Cardiovascular: Normal rate, regular rhythm, S1 normal and S2 normal.    No murmur heard.  Pulmonary/Chest: No respiratory distress. She has no wheezes. She has no rhonchi. She has no rales. Right breast exhibits no inverted nipple, no mass, no nipple discharge, no skin change and no tenderness. Left breast exhibits no inverted nipple, no mass, no nipple discharge, no skin change and no tenderness. Breasts are symmetrical.   Abdominal: Soft. Normal appearance. She exhibits no distension. There is no tenderness. There is no rigidity, no rebound, no guarding and no CVA tenderness.   Genitourinary:  Vulva normal in appearance with no lesions present. There is no tenderness or lesion on the right labia. There is no tenderness or lesion on the left labia. Uterus is not enlarged and not tender. Cervix exhibits no motion tenderness and no discharge. Right adnexum displays no mass, no tenderness and no fullness. Left adnexum displays no mass, no tenderness and no fullness. No tenderness or bleeding around the vagina. No vaginal discharge found. Rectal exam shows no external hemorrhoid.       Rectal exam deferred.  Musculoskeletal:        Extremities are non tender.  Lymphadenopathy:     She has no cervical adenopathy.     She has no axillary adenopathy.  Neurological: She is oriented to person, place, and time. Gait normal.  Skin: No cyanosis. Nails show no clubbing.  Psychiatric: She has a normal mood and affect    A/P:  1. Encounter for screening mammogram for malignant neoplasm of breast  - MAMMO SCREENING BILATERAL W TYREE; Future        1.  Annual exam performed.  Pap smear: due in 2023 Breast exam performed and patient encouraged to continue breast self awareness.  This may include monthly self breast exam.  encouraged her to have mammogram done.  2.  Counseling and Education: asked  her to check a couple of fasting blood sugars as she has the machine at home.  She does not have insurance and so cannot pay for labs/imaging at this time out of pocket.   Body mass index is 33.74 kg/m².  Encouraged to continue with healthy lifestyle.  Asked her to contact me through the portal if her fasting blood sugars are > 95      3.  Contraception: Nothing we discussed the risks of another pregnancy at age 45 including risk of genetic abnormalities, GDM,  PIH, among others.   4.  Return to the office in 1 year  or sooner PRN.   Advanced care planning/counseling discussion

## 2022-01-20 NOTE — BH CONSULTATION LIAISON ASSESSMENT NOTE - CURRENT MEDICATION
MEDICATIONS  (STANDING):  baclofen 5 milliGRAM(s) Oral every 8 hours  chlorhexidine 2% Cloths 1 Application(s) Topical <User Schedule>  cholecalciferol 1000 Unit(s) Oral daily  dextrose 50% Injectable 25 milliLiter(s) IV Push every 15 minutes  dextrose 50% Injectable 50 milliLiter(s) IV Push every 15 minutes  magnesium oxide 400 milliGRAM(s) Oral three times a day with meals  methimazole 10 milliGRAM(s) Oral daily  metoclopramide 10 milliGRAM(s) Oral every 8 hours  metoprolol succinate ER 50 milliGRAM(s) Oral at bedtime  metoprolol succinate ER 25 milliGRAM(s) Oral daily  mexiletine 150 milliGRAM(s) Oral every 8 hours  mirtazapine 7.5 milliGRAM(s) Oral at bedtime  multivitamin 1 Tablet(s) Oral daily  pantoprazole    Tablet 40 milliGRAM(s) Oral every 12 hours  piperacillin/tazobactam IVPB.. 3.375 Gram(s) IV Intermittent every 8 hours  polyethylene glycol 3350 17 Gram(s) Oral every 12 hours  potassium phosphate / sodium phosphate Tablet (K-PHOS No. 2) 1 Tablet(s) Oral two times a day  senna 2 Tablet(s) Oral at bedtime  sertraline 100 milliGRAM(s) Oral daily  sodium chloride 1 Gram(s) Oral every 8 hours  sodium chloride 0.9% lock flush 3 milliLiter(s) IV Push every 8 hours  spironolactone 25 milliGRAM(s) Oral daily  vancomycin    Solution 125 milliGRAM(s) Oral every 12 hours    MEDICATIONS  (PRN):  acetaminophen     Tablet .. 650 milliGRAM(s) Oral every 6 hours PRN Temp greater or equal to 38C (100.4F), Mild Pain (1 - 3), Moderate Pain (4 - 6)  traMADol 25 milliGRAM(s) Oral every 6 hours PRN Severe Pain (7 - 10)

## 2022-01-20 NOTE — BH CONSULTATION LIAISON ASSESSMENT NOTE - NSBHCHARTREVIEWVS_PSY_A_CORE FT
Vital Signs Last 24 Hrs  T(C): 36.3 (20 Jan 2022 10:12), Max: 37.2 (19 Jan 2022 22:21)  T(F): 97.3 (20 Jan 2022 10:12), Max: 99 (19 Jan 2022 22:21)  HR: 106 (20 Jan 2022 10:12) (97 - 112)  BP: --  BP(mean): 70 (20 Jan 2022 10:12) (70 - 82)  RR: 18 (20 Jan 2022 10:12) (18 - 18)  SpO2: 97% (20 Jan 2022 10:12) (97% - 100%)

## 2022-01-20 NOTE — PROGRESS NOTE ADULT - PROBLEM SELECTOR PLAN 5
Will continue to f/u for Symptoms and GOC.   Case d/w with Dr. Arrieta.         Francisco Burgos MD   Geriatrics and Palliative Care (GAP) Consult Service    of Geriatric and Palliative Medicine  [facility]      Please page the following number for clinical matters between the hours of 9 am and 5 pm from Monday through Friday : [phone]    After 5pm and on weekends, please see the contact information below:    In the event of newly developing, evolving, or worsening symptoms, please contact the Palliative Medicine team via pager (if the patient is at [facility] #[phone] or if the patient is at [facility] #[phone]) The Geriatric and Palliative Medicine service has coverage 24 hours a day/ 7 days a week to provide medical recommendations regarding symptom management needs via telephone

## 2022-01-20 NOTE — PROGRESS NOTE ADULT - SUBJECTIVE AND OBJECTIVE BOX
Enrike Creole  ID # [id number] Mis   HPI: 67 y/o M with Stage D NICM s/p HM2 LVAD in 9/2017 at  (due to severe PAD) with TV ring, multiple GI bleeds, thus maintained off AC, CKD 3prior COVID-19 infection in 4/2020, recurrent syncope post LVAD s/p ILR, s/p prolonged complex hospitalization from 6/14-11/16/2021 initially admitted with abdominal pain complicated by GIB, respiratory failure s/p trach, multiple infections, s/p cholecystotomy tube and SMA stent 10/2021, ultimately [email or web address] and then returned to [facility] for trach decannulation 12/2 who now presents with recurrent COVID-19 infection, initially in distributive shock. 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     He had elevated inflammatory markers which are now improving. He received monoclonal antibodies and was started on remdesivir and dexamethasone. He's afebrile with BCx from 12/18 and 12/30 NGTD. He had an episode of hypoxia noted 1/1, possibly in the setting of aspiration; however, it improved. He had runs of MMVT in setting of hypokalemia and being off his beta blocker which has since resolved. As per HF notes: "He currently appears well compensated from HF perspective. His LDH is stable. His LVAD is functioning well without s/s of pump malfunction."     Recently he was noted to have positive blood culture for serratia marcescens, currently on IV antibiotics. His Hgb continues to slowly drift down, remains off anticoagulation/ antiplatelets. He now has a decline in his appetite and is noted to have worsening abdominal pain with new CT findings suggesting Splenic Abscess. He was seen by IR that based on negative blood Cx and clinical stability is hesitant to IR drainage.     SUBJECTIVE AND OBJECTIVE: He was c/o left flank pain. He was also c/o intractable hiccups which are impairing his capacity to eat. He recognized he was depressed because he has been in the hospital for many days and "nothing is getting better." He recognized he is not taking medications because of his hiccups and because "nothing is getting better."   INTERVAL HPI/OVERNIGHT EVENTS: No new events.     DNR on chart:   Allergies    Amiodarone Hydrochloride (Other (Severe))    Intolerances    MEDICATIONS  (STANDING):  baclofen 5 milliGRAM(s) Oral every 8 hours  chlorhexidine 2% Cloths 1 Application(s) Topical <User Schedule>  cholecalciferol 1000 Unit(s) Oral daily  dextrose 50% Injectable 50 milliLiter(s) IV Push every 15 minutes  dextrose 50% Injectable 25 milliLiter(s) IV Push every 15 minutes  dronabinol 2.5 milliGRAM(s) Oral at bedtime  gabapentin 100 milliGRAM(s) Oral two times a day  magnesium oxide 400 milliGRAM(s) Oral three times a day with meals  melatonin 3 milliGRAM(s) Oral at bedtime  methimazole 10 milliGRAM(s) Oral daily  metoclopramide Injectable 10 milliGRAM(s) IV Push three times a day  metoprolol succinate ER 50 milliGRAM(s) Oral at bedtime  metoprolol succinate ER 25 milliGRAM(s) Oral daily  mexiletine 150 milliGRAM(s) Oral every 8 hours  multivitamin 1 Tablet(s) Oral daily  pantoprazole  Injectable 40 milliGRAM(s) IV Push two times a day  piperacillin/tazobactam IVPB.. 3.375 Gram(s) IV Intermittent every 8 hours  polyethylene glycol 3350 17 Gram(s) Oral every 12 hours  senna 2 Tablet(s) Oral at bedtime  sertraline 100 milliGRAM(s) Oral daily  sodium chloride 1 Gram(s) Oral every 8 hours  sodium chloride 0.9% lock flush 3 milliLiter(s) IV Push every 8 hours  spironolactone 25 milliGRAM(s) Oral daily  vancomycin    Solution 125 milliGRAM(s) Oral every 12 hours    MEDICATIONS  (PRN):  acetaminophen     Tablet .. 650 milliGRAM(s) Oral every 6 hours PRN Temp greater or equal to 38C (100.4F), Mild Pain (1 - 3), Moderate Pain (4 - 6)      ITEMS UNCHECKED ARE NOT PRESENT    PRESENT SYMPTOMS: [ ]Unable to obtain due to poor mentation   Source if other than patient:  [ ]Family   [ ]Team     Pain:  [ x]yes [ ]no  QOL impact - Not feeling comfortable.   Location -                  Left flank   Aggravating factors - palpation   Quality - not able to indicate  Radiation - none   Timing- constant   Severity (0-10 scale): Moderate   Minimal acceptable level (0-10 scale): Mild to moderate     Dyspnea:                           [ ]Mild [ ]Moderate [ ]Severe  Anxiety:                             [ ]Mild [ ]Moderate [ ]Severe  Fatigue:                             [ ]Mild [ ]Moderate [ ]Severe  Nausea:                             [ ]Mild [ ]Moderate [ ]Severe  Loss of appetite:              [ ]Mild [x ]Moderate [ ]Severe  Constipation:                    [ ]Mild [ ]Moderate [ ]Severe    CPOT:    https://www.[facility].org/getattachment/[email or web address]-9u1w-[email or web address]/Critical-Care-Pain-Observation-Tool-(CPOT)    PAIN AD Score:	  http://geriatrictoolkit.[email or web address]/painad.pdf (Ctrl + left click to view)    Other Symptoms:  [x ]All other review of systems negative but as per subjective above.     Palliative Performance Status Version 2:   40      %      [email or web address]/[email or web address]_performance_scale_[email or web address].pdf  PHYSICAL EXAM:  Vital Signs Last 24 Hrs  T(C): 36.6 (20 Jan 2022 13:06), Max: 37.2 (19 Jan 2022 22:21)  T(F): 97.8 (20 Jan 2022 13:06), Max: 99 (19 Jan 2022 22:21)  HR: 107 (20 Jan 2022 13:06) (97 - 112)  BP: 84/61 (20 Jan 2022 13:06) (84/61 - 84/61)  BP(mean): 68 (20 Jan 2022 13:06) (68 - 82)  RR: 16 (20 Jan 2022 13:06) (16 - 18)  SpO2: 99% (20 Jan 2022 13:06) (97% - 99%) I&O's Summary    19 Jan 2022 07:01  -  20 Jan 2022 07:00  --------------------------------------------------------  IN: 660 mL / OUT: 675 mL / NET: -15 mL    20 Jan 2022 07:01  -  20 Jan 2022 16:59  --------------------------------------------------------  IN: 280 mL / OUT: 300 mL / NET: -20 mL       GENERAL:  [x ]Alert  [ x]Oriented x 2 (person and place)  [ ]Lethargic  [x ]Cachexia  [ ]Unarousable  [x ]Verbal  [ ]Non-Verbal  Behavioral:   [ ]Anxiety  [ ]Delirium [ ]Agitation [ ]Other  HEENT:  [x ]Normal   [ ]Dry mouth   [ ]ET Tube/Trach  [ ]Oral lesions  PULMONARY:   [ ]Clear [ ]Tachypnea  [ ]Audible excessive secretions   [ ]Rhonchi        [ ]Right [ ]Left [ ]Bilateral  [ ]Crackles        [ ]Right [ ]Left [ ]Bilateral  [ ]Wheezing     [ ]Right [ ]Left [ ]Bilateral  [x ]Diminished BS [ ] Right [ ]Left [x ]Bilateral  CARDIOVASCULAR:    [x ]Regular [ ]Irregular [ ]Tachy  [ ]Jose [ ]Murmur [x ]Other: LVAD   GASTROINTESTINAL:  [ ]Soft  [ ]Distended   [x ]+BS  [ ]Non tender [x ]Tender over left flank. No Rebound or guarding  [ ]PEG [ ]OGT/ NGT   Last BM:  1/12  [x] Biliary drain   GENITOURINARY:  [x ]Normal [ ]Incontinent   [ ]Oliguria/Anuria   [ ]Landrum  MUSCULOSKELETAL:   [ ]Normal   [x ]Weakness  [ ]Bed/Wheelchair bound [ ]Edema  NEUROLOGIC:   [ ]No focal deficits  [ ] Cognitive impairment  [ ] Dysphagia [ ]Dysarthria [ ] Paresis [x ]Other: Confused. Initially he indicated he was in the hospital just for a few days but then he stated he was here since July. However, he was able to be re-oriented. He did know he was in the hospital and recognized he had heart issues for which he had a LVAD. His attention was good and he was able to appropriately answer questions and f/u a discussion about the reasons why he was not taking his medications. Through my eval his mental status was not fluctuating. Though confused about the time he was in the hospital, his thinking did not appear to be disorganized so if Delirium is an issues it may be mild. On the other hand, his mood was depressed. No suicidal edition was evident.   SKIN:   [ ]Normal  [ ]Rash   [ ]Pressure ulcer(s) [ ]y [ ]n present on admission  Normal   CRITICAL CARE:  [ ]Shock Present  [ ]Septic [ ]Cardiogenic [ ]Neurologic [ ]Hypovolemic  [ ]Vasopressors [ ]Inotropes  [ ]Respiratory failure present [ ]Mechanical Ventilation [ ]Non-invasive ventilatory support [ ]High-Flow   [ ]Acute  [ ]Chronic [ ]Hypoxic  [ ]Hypercarbic [ ]Other  [ ]Other organ failure     LABS:                        8.5    12.46 )-----------( 192      ( 20 Jan 2022 05:40 )             26.6   01-20    128<L>  |  95<L>  |  12  ----------------------------<  102<H>  4.1   |  22  |  1.00    Ca    9.3      20 Jan 2022 05:39  Phos  3.2     01-19  Mg     1.9     01-19          RADIOLOGY & ADDITIONAL STUDIES:  < from: CT Abdomen and Pelvis w/ IV Cont (01.19.22 @ 13:47) >  IMPRESSION:  *  Status post LVAD. No evidence of fluid collection adjacent to the LVAD.  *  Hypoenhancing lesion in the spleen measuring 4.0 cm, new compared to   prior examination 10/5/2021. In the appropriate clinical setting, splenic   abscess may be considered.        --- End of Report ---            MARY SUAREZ MD; Attending Radiologist  This document has been electronically signed. Jan 19 2022  3:00PM    < end of copied text >    Protein Calorie Malnutrition Present: [ ]mild [ ]moderate [ ]severe [ ]underweight [ ]morbid obesity  https://www.andeal.org/vault/2440/web/files/ONC/Table_Clinical%20Characteristics%20to%20Document%20Malnutrition-White%20JV%20et%20al%202012.pdf    Height (cm): 182.9 (12-13-21 @ 04:30), 165.1 (12-02-21 @ 13:55), 177.8 (10-18-21 @ 15:55)  Weight (kg): 52.6 (01-12-22 @ 12:50), 53.6 (12-02-21 @ 13:55), 61.6 (10-18-21 @ 15:55)  BMI (kg/m2): 15.7 (01-12-22 @ 12:50), 16 (12-13-21 @ 04:30), 19.7 (12-02-21 @ 13:55)    [ ]PPSV2 < or = 30%  [ ]significant weight loss [ ]poor nutritional intake [ ]anasarca    [ ]Artificial Nutrition    REFERRALS:   [ ]Chaplaincy  [ ]Hospice  [ ]Child Life  [ ]Social Work  [ ]Case management [ ]Holistic Therapy     Goals of Care Document:

## 2022-01-20 NOTE — PROGRESS NOTE ADULT - SUBJECTIVE AND OBJECTIVE BOX
Yasmeen Arrieta MD  Division of Hospital Medicine  Pager [phone], If no response/off hours [phone]    Patient is a 66y old  Male who presents with a chief complaint of COVID (+) with AMS and hypotension (20 Jan 2022 07:27)        SUBJECTIVE / OVERNIGHT EVENTS:  pt continues to endorse lower abd pain and hiccups making it difficult to eat  no n/v/f/chills, cp, sob    CAPILLARY BLOOD GLUCOSE    I&O's Summary    19 Jan 2022 07:01  -  20 Jan 2022 07:00  --------------------------------------------------------  IN: 660 mL / OUT: 675 mL / NET: -15 mL    20 Jan 2022 07:01  -  20 Jan 2022 16:37  --------------------------------------------------------  IN: 280 mL / OUT: 300 mL / NET: -20 mL      Vital Signs Last 24 Hrs  T(C): 36.6 (20 Jan 2022 13:06), Max: 37.2 (19 Jan 2022 22:21)  T(F): 97.8 (20 Jan 2022 13:06), Max: 99 (19 Jan 2022 22:21)  HR: 107 (20 Jan 2022 13:06) (97 - 112)  BP: 84/61 (20 Jan 2022 13:06) (84/61 - 84/61)  BP(mean): 68 (20 Jan 2022 13:06) (68 - 82)  RR: 16 (20 Jan 2022 13:06) (16 - 18)  SpO2: 99% (20 Jan 2022 13:06) (97% - 99%)    PHYSICAL EXAM:  GENERAL:  chronically ill appearing, in NAD  HEAD:  NCAT  EYES: PERRLA, conjunctiva clear  NECK: Supple, No JVD  CHEST/LUNG: CTA B/L.   HEART: Reg rate. Normal S1, S2. LVAD in place  ABDOMEN: Soft, mild TTP lower abd, no rebound/guarding  r perc dawn tube w/ yellow output  EXTREMITIES:  2+ Peripheral Pulses, No clubbing, cyanosis, edema.  PSYCH: flat affect  SKIN: No rashes or lesions    LABS:                        8.5    12.46 )-----------( 192      ( 20 Jan 2022 05:40 )             26.6     01-20    128<L>  |  95<L>  |  12  ----------------------------<  102<H>  4.1   |  22  |  1.00    Ca    9.3      20 Jan 2022 05:39  Phos  3.2     01-19  Mg     1.9     01-19      RADIOLOGY & ADDITIONAL TESTS:  Consultant(s) Notes Reviewed:  psych, CHF  Care Discussed with Consultants/Other Providers: Floor NP/PA    MEDICATIONS  (STANDING):  baclofen 5 milliGRAM(s) Oral every 8 hours  chlorhexidine 2% Cloths 1 Application(s) Topical <User Schedule>  cholecalciferol 1000 Unit(s) Oral daily  dextrose 50% Injectable 25 milliLiter(s) IV Push every 15 minutes  dextrose 50% Injectable 50 milliLiter(s) IV Push every 15 minutes  magnesium oxide 400 milliGRAM(s) Oral three times a day with meals  melatonin 3 milliGRAM(s) Oral at bedtime  methimazole 10 milliGRAM(s) Oral daily  metoclopramide 10 milliGRAM(s) Oral every 8 hours  metoprolol succinate ER 50 milliGRAM(s) Oral at bedtime  metoprolol succinate ER 25 milliGRAM(s) Oral daily  mexiletine 150 milliGRAM(s) Oral every 8 hours  mirtazapine 7.5 milliGRAM(s) Oral at bedtime  multivitamin 1 Tablet(s) Oral daily  pantoprazole    Tablet 40 milliGRAM(s) Oral every 12 hours  piperacillin/tazobactam IVPB.. 3.375 Gram(s) IV Intermittent every 8 hours  polyethylene glycol 3350 17 Gram(s) Oral every 12 hours  senna 2 Tablet(s) Oral at bedtime  sertraline 100 milliGRAM(s) Oral daily  sodium chloride 1 Gram(s) Oral every 8 hours  sodium chloride 0.9% lock flush 3 milliLiter(s) IV Push every 8 hours  spironolactone 25 milliGRAM(s) Oral daily  vancomycin    Solution 125 milliGRAM(s) Oral every 12 hours    MEDICATIONS  (PRN):  acetaminophen     Tablet .. 650 milliGRAM(s) Oral every 6 hours PRN Temp greater or equal to 38C (100.4F), Mild Pain (1 - 3), Moderate Pain (4 - 6)  traMADol 25 milliGRAM(s) Oral every 6 hours PRN Severe Pain (7 - 10)

## 2022-01-20 NOTE — BH CONSULTATION LIAISON ASSESSMENT NOTE - CASE SUMMARY
This is a 66 year-old male patient, , with 3 children, with no known psychiatry hx, no past inpatient psychiatric hospitalizations, with PMHx LVAD placement, history of COVID infection in April 2020, history of a prolonged hospitalization in 2021 with recurrent sepsis, pneumonia, intubated/trach x2 cycles, chronic gall bladder disease s/p perc dawn, SMA ischemia s/p stent, cachexia who was discharged to rehab but is presented from [facility] on 12/13 with septic shock 2/2 COVID (+) S/P pressor support now titrated off and downgraded to the floor. Hosp course c/b serratia bacteremia on iv zosyn, aspiration pna, MMVT due to hypokalemia, and now CT finding of splenic abscess.  Psychiatry consulted to assess for depression in the context of patient refusing medications.    I have seen and evaluated this patient myself. Chart, labs, meds reviewed. I agree with NP's assessment and plan.   This is a 66 year-old male patient, , with 3 children, with no known psychiatry hx, no past inpatient psychiatric hospitalizations, with PMHx LVAD placement, history of COVID infection in April 2020, history of a prolonged hospitalization in 2021 with recurrent sepsis, pneumonia, intubated/trach x2 cycles, chronic gall bladder disease s/p perc dawn, SMA ischemia s/p stent, cachexia who was discharged to rehab but is presented from [facility] on 12/13 with septic shock 2/2 COVID (+) S/P pressor support now titrated off and downgraded to the floor. Hosp course c/b serratia bacteremia on iv zosyn, aspiration pna, MMVT due to hypokalemia, and now CT finding of splenic abscess.  Psychiatry consulted to assess for depression in the context of patient refusing medications.    I have seen and evaluated this patient myself. Chart, labs, meds reviewed. Patient presenting with clouding of sensorium. I agree with NP's assessment and plan.

## 2022-01-20 NOTE — BH CONSULTATION LIAISON ASSESSMENT NOTE - HPI (INCLUDE ILLNESS QUALITY, SEVERITY, DURATION, TIMING, CONTEXT, MODIFYING FACTORS, ASSOCIATED SIGNS AND SYMPTOMS)
Patient is a 66 year-old  male, with 4 children, with no known psychiatry hx, no past inpatient psychiatric hospitalizations, with PMHx LVAD placement, history of COVID infection in April 2020, history of a prolonged hospitalization in 2021 with recurrent sepsis, pneumonia, intubated/trach x2 cycles, chronic gall bladder disease s/p perc dawn, SMA ischemia s/p stent, cachexia who was discharged to rehab but is presented from [facility] on 12/13 with septic shock 2/2 COVID (+) S/P pressor support now titrated off and downgraded to the floor. Hosp course c/b serratia bacteremia on iv zosyn, aspiration pna, MMVT due to hypokalemia, and now CT finding of splenic abscess.  Psychiatry consulted to assess for depression in the context of patient refusing medications.  Seen and evaluated, awake and alert, nonverbal, at times answers yes and no questions by nodding or shaking head, sometimes just staring after questions are asked.  Able to states he is in the hospital when given choices.  When given choices about the current year is not able to identify the current year we are in.  Does endorse feeling depressed, sad, not able to elaborate on his symptoms, becomes tearful.  He denies feeling anxious or having panic attacks.  He reports he is  and has 4 children, does indicate he has grand children but when asked how many does not respond.  He denies substance or alcohol use.  When asked about recent medication refusal, does not answer, looks away.  He indicates he is sleeping well and eating well.  He denies thoughts of wanting to harm himself or wanting to end his life.  When asked about hopefulness about the future, does not answer.  During interview, patient appearing uncomfortable, begins to hiccup, moans in pain.  Points to his abdomen area when asked where his pain is.  Patient offered water and was able to take few sips of water.   Patient is a 66 year-old  male, with 3 children, with no known psychiatry hx, no past inpatient psychiatric hospitalizations, with PMHx LVAD placement, history of COVID infection in April 2020, history of a prolonged hospitalization in 2021 with recurrent sepsis, pneumonia, intubated/trach x2 cycles, chronic gall bladder disease s/p perc dawn, SMA ischemia s/p stent, cachexia who was discharged to rehab but is presented from [facility] on 12/13 with septic shock 2/2 COVID (+) S/P pressor support now titrated off and downgraded to the floor. Hosp course c/b serratia bacteremia on iv zosyn, aspiration pna, MMVT due to hypokalemia, and now CT finding of splenic abscess.  Psychiatry consulted to assess for depression in the context of patient refusing medications.    Seen and evaluated, awake and alert, nonverbal, at times answers yes and no questions by nodding or shaking head, sometimes just staring after questions are asked.  Able to states he is in the hospital when given choices.  When given choices about the current year is not able to identify the current year we are in.  Does endorse feeling depressed, sad, not able to elaborate on his symptoms, becomes tearful.  He denies feeling anxious or having panic attacks.  He reports he is  and has 4 children, does indicate he has grand children but when asked how many, does not respond.  He denies substance or alcohol use.  When asked about recent medication refusal, does not answer, looks away.  He indicates he is sleeping well and eating well.  He denies thoughts of wanting to harm himself or wanting to end his life.  When asked about hopefulness about the future, does not answer.  During interview, patient appearing uncomfortable, begins to hiccup, moans in pain.  Points to his abdomen area when asked where his pain is.  Patient offered water and was able to take few sips of water.    Spoke with patient's sister, Cristina Rudolph ([phone]), who reports patient lives with her in [place] with her family.  States patient with prolonged hospital stay a few months ago for recurrent infection, pna, d/c'ed to rehab and returned home for 1 week before he was readmitted back on this admission.  She reports patient with no previous PPhx, no h/o depression, reports "was happy".  She reports patient likely is depressed "because we're not able to visit", social isolation.  She reports last speaking with the past 3 weeks ago on the phone, indicated at that time that patient was conversive with her, talking to her about how he was having weakness and difficulty walking, stated that patient was happy at that time when she was speaking to him.  She however reports she has not been able to get in contact with the patient, her children attempting to come to hospital to visit patient however are unable to due to the visiting restrictions and states possibly patient is depressed by this.  She denies patient has any prior cognitive impairments or dementia prior to this.  She reports patient is , has 3 children (1 in [place], 1 in [place] and another in [place]), states patient had been living in [place] for sometime up until his divorce several years ago and patient relocated back to [place].  She reports prior to his cardiac issues and surgery in 2018, patient had been functioning independently, "working hard in the warehouse."  She denies patient has any h/o SA, no recent suicidality.  She reports remote alcohol use and nicotine use but not since his LVAD surgery in 2018; denies recent use of illicit substances or alcohol.

## 2022-01-20 NOTE — PROGRESS NOTE ADULT - ASSESSMENT
67 y/o M with Stage D NICM s/p HM2 LVAD in 9/2017 at  (due to severe PAD) with TV ring, multiple GI bleeds, thus maintained off AC, CKD 3prior COVID-19 infection in 4/2020, recurrent syncope post LVAD s/p ILR, s/p prolonged complex hospitalization from 6/14-11/16/2021 initially admitted with abdominal pain complicated by GIB, respiratory failure s/p trach, multiple infections, s/p cholecystotomy tube and SMA stent 10/2021, ultimately discharged to [facility] rehab and then returned to [facility] for trach decannulation 12/2 who now presents with recurrent COVID-19 infection, initially in distributive shock. 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 Though from the COVID and respiratory point of view, his condition has improved, he now has a decline in his appetite, is refusing to take certain medications and is noted to have worsening abdominal pain with new CT findings suggesting Splenic Abscess. He was seen by IR that based on negative blood Cx and clinical stability is hesitant to IR drainage.

## 2022-01-20 NOTE — PROGRESS NOTE ADULT - PROBLEM SELECTOR PLAN 10
-Diet: Regular  -DVT: scd given hx of multiple GIB  -Dispo: continued symptom support/management of depression and will re-eval for fam meeting next week    overall guarded prognosis  Would need LVAD accepting facility when medically optimized -Diet: Regular  -DVT: scd given hx of multiple GIB  -Dispo: continued symptom support/management of depression and will re-eval for fam meeting next week    overall guarded prognosis, d/w sister Cristina over phone  Would need LVAD accepting facility when medically optimized

## 2022-01-20 NOTE — PROGRESS NOTE ADULT - PROBLEM SELECTOR PLAN 1
with constant hiccups all through out my encounter with the patient.   he indicated hiccups are limiting his capacity to eat and affecting his comfort.   DC Reglan PO. Start Reglan 10mg IV q 8ATC   Continue Baclofen 5mg PO q 8ATC   Will add Gabapentin 100mg PO bid   Not sure if his Splenic abscess may be causing this symptom as well. Will try to reach out to ID to better understand this issues, its prognosis and Rx option. Will also try to "[employer] back with IR to better understand options for Rx.

## 2022-01-20 NOTE — PROGRESS NOTE ADULT - ASSESSMENT
Past psychiatric history: Denies any inpatient psychiatric hospitalizations, denies s/a.     Psychological assessment: Seen resting in bed on 2DSU. Ate small portion of oatmeal this morning. But38 minutes spent ow with stomach pain. Developed hiccoughs short time ago. Unable to swallow pills due to hiccoughing. Mood "not good." Appears uncomfortable. Stated he's had stomach pain that "comes and goes." Denies issues with sleep. Provided supportive therapy. Attempted to reach his nephew, Miller Rudolph, ([phone]) by phone, but his number does not allow voicemail.     Mental status exam: Seen resting in bed. Awake, oriented x3-4. Speaking with soft volume and nodding yes/no to questions. Thought process goal directed. No evidence of any psychosis, kenan, delusions. No SI/HI. Mood sad. Affect constricted. Denies anxiety. Endorsed stomach pain. Insight and judgment adequate.     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 F06. 31 Systolic heart failure I50.2  LVAD Z95.811    Recommendations:   Seen by psychiatry  May benefit from GI consult   Seen by Palliative care   As Creole is patient’s primary language, would benefit from all information being explained using  services     Behavioral Cardiology will follow      38 minutes spent on total patient encounter

## 2022-01-21 NOTE — PROGRESS NOTE ADULT - SUBJECTIVE AND OBJECTIVE BOX
Yasmeen Arrieta MD  Division of Hospital Medicine  Pager [phone], If no response/off hours [phone]    Patient is a 66y old  Male who presents with a chief complaint of COVID (+) with AMS and hypotension (21 Jan 2022 11:45)        SUBJECTIVE / OVERNIGHT EVENTS:  overnight no events  no n/v/f/chills, cp  pt continues to intermittently take meds  this morning at bedside, he states his abd pain is improved and that he still has hiccups but it is better and that he is willing to take meds prescribed to him    CAPILLARY BLOOD GLUCOSE        I&O's Summary    20 Jan 2022 07:01  -  21 Jan 2022 07:00  --------------------------------------------------------  IN: 520 mL / OUT: 1425 mL / NET: -905 mL    21 Jan 2022 07:01  -  21 Jan 2022 16:04  --------------------------------------------------------  IN: 140 mL / OUT: 400 mL / NET: -260 mL      Vital Signs Last 24 Hrs  T(C): 36.4 (21 Jan 2022 14:26), Max: 37 (21 Jan 2022 12:33)  T(F): 97.6 (21 Jan 2022 14:26), Max: 98.6 (21 Jan 2022 12:33)  HR: 97 (21 Jan 2022 14:26) (85 - 97)  BP: --  BP(mean): 82 (21 Jan 2022 12:33) (74 - 82)  RR: 16 (21 Jan 2022 14:26) (16 - 18)  SpO2: 99% (21 Jan 2022 14:26) (98% - 99%)    PHYSICAL EXAM:  GENERAL:  chronically ill appearing, in NAD  HEAD:  NCAT  EYES: PERRLA, conjunctiva clear  NECK: Supple, No JVD  CHEST/LUNG: CTA B/L.   HEART: Reg rate. Normal S1, S2. LVAD in place  ABDOMEN: Soft, mild TTP lower abd, no rebound/guarding  r perc dawn tube w/ yellow output  EXTREMITIES:  2+ Peripheral Pulses, No clubbing, cyanosis, edema.  PSYCH: flat affect  SKIN: No rashes or lesions    LABS:                        8.7    11.16 )-----------( 184      ( 21 Jan 2022 05:34 )             27.2     01-21    128<L>  |  93<L>  |  16  ----------------------------<  87  3.9   |  23  |  1.07    Ca    9.9      21 Jan 2022 05:34  Phos  3.5     01-21  Mg     2.1     01-21      RADIOLOGY & ADDITIONAL TESTS:  Consultant(s) Notes Reviewed:  chf, palliative  Care Discussed with Consultants/Other Providers: Floor NP/PA    MEDICATIONS  (STANDING):  baclofen 5 milliGRAM(s) Oral every 8 hours  chlorhexidine 2% Cloths 1 Application(s) Topical <User Schedule>  cholecalciferol 1000 Unit(s) Oral daily  dextrose 50% Injectable 25 milliLiter(s) IV Push every 15 minutes  dextrose 50% Injectable 50 milliLiter(s) IV Push every 15 minutes  dronabinol 2.5 milliGRAM(s) Oral at bedtime  gabapentin 100 milliGRAM(s) Oral two times a day  magnesium oxide 400 milliGRAM(s) Oral three times a day with meals  melatonin 3 milliGRAM(s) Oral at bedtime  methimazole 10 milliGRAM(s) Oral daily  metoclopramide Injectable 10 milliGRAM(s) IV Push three times a day  metoprolol succinate ER 50 milliGRAM(s) Oral at bedtime  metoprolol succinate ER 25 milliGRAM(s) Oral daily  mexiletine 150 milliGRAM(s) Oral every 8 hours  mirtazapine 7.5 milliGRAM(s) Oral at bedtime  multivitamin 1 Tablet(s) Oral daily  pantoprazole  Injectable 40 milliGRAM(s) IV Push two times a day  piperacillin/tazobactam IVPB.. 3.375 Gram(s) IV Intermittent every 8 hours  polyethylene glycol 3350 17 Gram(s) Oral every 12 hours  senna 2 Tablet(s) Oral at bedtime  sertraline 100 milliGRAM(s) Oral daily  sodium chloride 1 Gram(s) Oral every 8 hours  sodium chloride 0.9% lock flush 3 milliLiter(s) IV Push every 8 hours  spironolactone 25 milliGRAM(s) Oral daily  vancomycin    Solution 125 milliGRAM(s) Oral every 12 hours    MEDICATIONS  (PRN):  acetaminophen     Tablet .. 650 milliGRAM(s) Oral every 6 hours PRN Temp greater or equal to 38C (100.4F), Mild Pain (1 - 3), Moderate Pain (4 - 6)  HYDROmorphone  Injectable 0.2 milliGRAM(s) IV Push every 3 hours PRN Moderate to severe pain

## 2022-01-21 NOTE — PROGRESS NOTE ADULT - PROBLEM SELECTOR PLAN 10
-Diet: Regular  -DVT: scd given hx of multiple GIB  -Dispo: continued symptom support/management of depression and will re-eval for fam meeting next week    overall guarded prognosis, sister Cristina narayanan  Would need LVAD accepting facility when medically optimized

## 2022-01-21 NOTE — PROGRESS NOTE ADULT - PROBLEM SELECTOR PLAN 2
hx of Serratia bacteremia, blood cultures 1/14 NGTD  WBC downtrending   - currently on IV zosyn   - ID following  - continue with Vanco PO for C. Diff ppx (Pt refusing)  - CT C/A/P 1/19 shows hypoenhancing lesion in the spleen measuring 4.0 cm.   - IR consulted, as the patient is not acutely ill at the moment with cleared Bcx, holding off on intervention for now. hx of Serratia bacteremia, blood cultures 1/14 NGTD  WBC downtrending   - currently on IV zosyn   - ID following  - continue with Vanco PO for C. Diff ppx (Pt refusing)  - CT C/A/P 1/19 shows hypoenhancing lesion in the spleen measuring 4.0 cm.   - IR consulted, as the patient is not acutely ill at the moment with cleared Bcx, holding off on intervention for now. Risks outweigh benefits.

## 2022-01-21 NOTE — PROGRESS NOTE ADULT - PROBLEM SELECTOR PLAN 4
history of thyrotoxicosis with amiodarone, on methimazole  - EP following, continue Mexiletine 150 mg PO TID   - continue BB as above  - unable to uptitrate BB due to MAPs, and unable to use amio   - Electrolyte repletion to maintain K 4-4.5 and Mg 2-2.5.

## 2022-01-21 NOTE — PROGRESS NOTE ADULT - ASSESSMENT
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 s/p HM2 LVAD in 9/2017 at  (due to severe PAD) with TV ring, multiple GI bleeds, thus maintained off AC, CKD 3prior COVID-19 infection in 4/2020, recurrent syncope post LVAD s/p ILR, s/p prolonged complex hospitalization from 6/14-11/16/2021 initially admitted with abdominal pain complicated by GIB, respiratory failure s/p trach, multiple infections, s/p cholecystotomy tube and SMA stent 10/2021, ultimately discharged to [facility] rehab and then returned to [facility] for trach decannulation 12/2 who now presents with recurrent COVID-19 infection, initially in distributive shock. 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     He had elevated inflammatory markers which are now improving. He received monoclonal antibodies and was started on remdesivir and dexamethasone. He's afebrile with BCx from 12/18 and 12/30 NGTD. He had an episode of hypoxia noted 1/1, possibly in the setting of aspiration. He is currently saturating well on RA. He had runs of MMVT in setting of hypokalemia and being off his beta blocker which has since resolved. He currently appears well compensated from HF perspective. His LDH is stable. His LVAD is functioning well without s/s of pump malfunction.     Recently he was noted to have positive blood culture for serratia marcescens. His Hgb has overall remain stable though he is off anticoagulation/ antiplatelets. He now has a decline in his appetite and is noted to have worsening abdominal pain. His prognosis is guarded, palliative care was consulted.

## 2022-01-21 NOTE — PROGRESS NOTE ADULT - PROBLEM SELECTOR PLAN 7
on 1/10 he fell while on his way to the bathroom  - xray of knee was unremarkable  - ct head 1/10 and 1/11 negative.

## 2022-01-21 NOTE — PROGRESS NOTE ADULT - SUBJECTIVE AND OBJECTIVE BOX
INFECTIOUS DISEASES FOLLOW UP-- Anitra Prater  [phone]    This is a follow up note for this  66yMale with  Sepsis  suspected splenic abscess  serratia bacteremia      ROS:  CONSTITUTIONAL:  frail non toxic  CARDIOVASCULAR:  No chest pain or palpitations  RESPIRATORY:  No dyspnea  GASTROINTESTINAL:  No nausea, vomiting, diarrhea, or abdominal pain  GENITOURINARY:  No dysuria, nephrostomy tube with yellow urine  NEUROLOGIC:  No headache,     Allergies    Amiodarone Hydrochloride (Other (Severe))    Intolerances        ANTIBIOTICS/RELEVANT:  antimicrobials  piperacillin/tazobactam IVPB.. 3.375 Gram(s) IV Intermittent every 8 hours  vancomycin    Solution 125 milliGRAM(s) Oral every 12 hours    immunologic:    OTHER:  acetaminophen     Tablet .. 650 milliGRAM(s) Oral every 6 hours PRN  baclofen 5 milliGRAM(s) Oral every 8 hours  chlorhexidine 2% Cloths 1 Application(s) Topical <User Schedule>  cholecalciferol 1000 Unit(s) Oral daily  dextrose 50% Injectable 25 milliLiter(s) IV Push every 15 minutes  dextrose 50% Injectable 50 milliLiter(s) IV Push every 15 minutes  dronabinol 2.5 milliGRAM(s) Oral at bedtime  gabapentin 100 milliGRAM(s) Oral two times a day  HYDROmorphone  Injectable 0.2 milliGRAM(s) IV Push every 3 hours PRN  magnesium oxide 400 milliGRAM(s) Oral three times a day with meals  melatonin 3 milliGRAM(s) Oral at bedtime  methimazole 10 milliGRAM(s) Oral daily  metoclopramide Injectable 10 milliGRAM(s) IV Push three times a day  metoprolol succinate ER 50 milliGRAM(s) Oral at bedtime  metoprolol succinate ER 25 milliGRAM(s) Oral daily  mexiletine 150 milliGRAM(s) Oral every 8 hours  mirtazapine 7.5 milliGRAM(s) Oral at bedtime  multivitamin 1 Tablet(s) Oral daily  pantoprazole  Injectable 40 milliGRAM(s) IV Push two times a day  polyethylene glycol 3350 17 Gram(s) Oral every 12 hours  senna 2 Tablet(s) Oral at bedtime  sertraline 100 milliGRAM(s) Oral daily  sodium chloride 1 Gram(s) Oral every 8 hours  sodium chloride 0.9% lock flush 3 milliLiter(s) IV Push every 8 hours  spironolactone 25 milliGRAM(s) Oral daily      Objective:  Vital Signs Last 24 Hrs  T(C): 36.8 (21 Jan 2022 16:00), Max: 37 (21 Jan 2022 12:33)  T(F): 98.2 (21 Jan 2022 16:00), Max: 98.6 (21 Jan 2022 12:33)  HR: 92 (21 Jan 2022 16:00) (85 - 97)  BP: --  BP(mean): 82 (21 Jan 2022 16:00) (75 - 82)  RR: 14 (21 Jan 2022 16:00) (14 - 18)  SpO2: 99% (21 Jan 2022 16:00) (98% - 99%)    PHYSICAL EXAM:  Constitutional:no acute distress  Eyes:ALONSO, EOMI  Ear/Nose/Throat: no oral lesions, 	  Respiratory: clear BL  Cardiovascular: S1S2 VAD sounds  Gastrointestinal:soft, (+) BS, no tenderness  cholecystotomy tube  Extremities:no e/e/c  No Lymphadenopathy  IV sites not inflammed.    LABS:                        8.7    11.16 )-----------( 184      ( 21 Jan 2022 05:34 )             27.2     01-21    128<L>  |  93<L>  |  16  ----------------------------<  87  3.9   |  23  |  1.07    Ca    9.9      21 Jan 2022 05:34  Phos  3.5     01-21  Mg     2.1     01-21            MICROBIOLOGY:      Culture - Blood in AM (01.14.22 @ 10:22)    Specimen Source: .Blood Blood    Culture Results:   No Growth Final          RECENT CULTURES:      RADIOLOGY & ADDITIONAL STUDIES:    < from: Xray Kidney Ureter Bladder (01.20.22 @ 07:06) >  IMPRESSION:  Nonobstructive bowel gas pattern.    < end of copied text >

## 2022-01-21 NOTE — PROGRESS NOTE ADULT - TIME BILLING
- Generation of cardiovascular treatment plan.  - Coordination of care with primary team.
- Review of records, telemetry, vital signs and daily labs.   - General and cardiovascular physical examination.  - Generation of cardiovascular treatment plan.  - Coordination of care with primary team.
- Review of notes/records, telemetry, vital signs and daily labs.   - General and cardiovascular physical examination.  - Generation of cardiovascular treatment plan.  - Coordination of care with primary team.
- Review of notes/records, telemetry, vital signs and daily labs.   - General and cardiovascular physical examination.  - Generation of cardiovascular treatment plan.  - Coordination of care with primary team.

## 2022-01-21 NOTE — PROGRESS NOTE ADULT - PROBLEM SELECTOR PLAN 8
recently has started to have a decline in his appetite  - nutrition following and appreciate recommendations  - continue with supplement  - KUB unremarkable, last recorded BM was on 1/12  - please place on standing bowel reg  - his overall prognosis is guarded, palliative care consulted. recently has started to have a decline in his appetite  - nutrition following and appreciate recommendations  - continue with supplement  - KUB unremarkable, last recorded BM was on 1/12  - please place on standing bowel reg  - his overall prognosis is guarded, palliative care consulted.      -Shira Morales

## 2022-01-21 NOTE — PROGRESS NOTE ADULT - PROBLEM SELECTOR PLAN 3
Continue with Toprol XL to 25mg qAM and 50 mg qPM (hold for MAP less than 70)  - continue spironolactone 25 mg daily  - Daily PT  - his overall prognosis is guarded, palliative care was reconsulted   - Family meeting was planned for today, however, cancelled Continue with Toprol XL to 25mg qAM and 50 mg qPM (hold for MAP less than 70)  - continue spironolactone 25 mg daily  - Daily PT  - his overall prognosis is guarded, palliative care was reconsulted   - Family meeting was planned for today, however, cancelled per palliative medicine request

## 2022-01-21 NOTE — PROGRESS NOTE ADULT - PROBLEM SELECTOR PLAN 2
CT w/ possible splenic abscess 4 cm in size  - IR consulted - risks of draining outweigh benefits soy as bacteremia resolved  - given multiple medical chronic issues, pt would not be a surgical candidate  - IV abx as above  - palliative on board for pain/symptom control - started Marinol as well

## 2022-01-21 NOTE — PROGRESS NOTE ADULT - PROBLEM SELECTOR PLAN 1
History of recurrent pneumonia and bacteremia; serratia in blood in October 2021. no longer candidate for bactrim as not tolerated by pt. TTE 1/13 with no vegetations or abscesses  - blood cx 1/11 + serratia on IV zosyn 1/12 for anticipated 2 week course through 1/25  - repeat bcx 1/14 NGTD  - trend wbc and temp curve  - CT A/P showing splenic abscess 4cm in size - see section below for management

## 2022-01-21 NOTE — PROGRESS NOTE ADULT - SUBJECTIVE AND OBJECTIVE BOX
Cardiology Fellow  [phone]  All Cardiology service information can be found  on [email or web address], password: [email or web address]    Interval History:    Medications:  acetaminophen     Tablet .. 650 milliGRAM(s) Oral every 6 hours PRN  baclofen 5 milliGRAM(s) Oral every 8 hours  chlorhexidine 2% Cloths 1 Application(s) Topical <User Schedule>  cholecalciferol 1000 Unit(s) Oral daily  dextrose 50% Injectable 25 milliLiter(s) IV Push every 15 minutes  dextrose 50% Injectable 50 milliLiter(s) IV Push every 15 minutes  dronabinol 2.5 milliGRAM(s) Oral at bedtime  gabapentin 100 milliGRAM(s) Oral two times a day  HYDROmorphone  Injectable 0.2 milliGRAM(s) IV Push every 3 hours PRN  magnesium oxide 400 milliGRAM(s) Oral three times a day with meals  melatonin 3 milliGRAM(s) Oral at bedtime  methimazole 10 milliGRAM(s) Oral daily  metoclopramide Injectable 10 milliGRAM(s) IV Push three times a day  metoprolol succinate ER 25 milliGRAM(s) Oral daily  metoprolol succinate ER 50 milliGRAM(s) Oral at bedtime  mexiletine 150 milliGRAM(s) Oral every 8 hours  mirtazapine 7.5 milliGRAM(s) Oral at bedtime  multivitamin 1 Tablet(s) Oral daily  pantoprazole  Injectable 40 milliGRAM(s) IV Push two times a day  piperacillin/tazobactam IVPB.. 3.375 Gram(s) IV Intermittent every 8 hours  polyethylene glycol 3350 17 Gram(s) Oral every 12 hours  senna 2 Tablet(s) Oral at bedtime  sertraline 100 milliGRAM(s) Oral daily  sodium chloride 1 Gram(s) Oral every 8 hours  sodium chloride 0.9% lock flush 3 milliLiter(s) IV Push every 8 hours  spironolactone 25 milliGRAM(s) Oral daily  vancomycin    Solution 125 milliGRAM(s) Oral every 12 hours      Vitals:  T(C): 36.5 (22 @ 08:14), Max: 36.6 (22 @ 13:06)  HR: 88 (22 @ 08:14) (85 - 107)  BP: 84/61 (22 @ 13:06) (84/61 - 84/61)  BP(mean): 78 (22 @ 08:14) (68 - 80)  ABP: --  ABP(mean): --  RR: 18 (22 @ 08:14) (16 - 18)  SpO2: 99% (22 @ 08:14) (98% - 99%)    Daily     Daily Weight in k.1 (2022 01:14)        I&O's Summary    2022 07:01  -  2022 07:00  --------------------------------------------------------  IN: 520 mL / OUT: 1425 mL / NET: -905 mL    2022 07:01  -  2022 11:46  --------------------------------------------------------  IN: 140 mL / OUT: 0 mL / NET: 140 mL        Physical Exam:  Appearance: No Acute Distress  HEENT: PERRL  Neck: No JVD  Cardiovascular: Normal S1 S2, No murmurs/rubs/gallops  Respiratory: Clear to auscultation bilaterally  Gastrointestinal: Soft, Non-tender	  Skin: No cyanosis	  Neurologic: Non-focal  Extremities: No LE edema  Psychiatry: A & O x 3, Mood & affect appropriate    LVAD Interrogation: No alarms last 24 hours  Programming Changes: No changes made    Labs:                        8.7    11.16 )-----------( 184      ( 2022 05:34 )             27.2         128<L>  |  93<L>  |  16  ----------------------------<  87  3.9   |  23  |  1.07    Ca    9.9      2022 05:34  Phos  3.5       Mg     2.1                     TELEMETRY:    Echocardiogram:     Cardiology Fellow    All Cardiology service information can be found  on [email or web address], password: [email or web address]    Interval History: No acute events overnight. Denies SOB or chest pain    Medications:  acetaminophen     Tablet .. 650 milliGRAM(s) Oral every 6 hours PRN  baclofen 5 milliGRAM(s) Oral every 8 hours  chlorhexidine 2% Cloths 1 Application(s) Topical <User Schedule>  cholecalciferol 1000 Unit(s) Oral daily  dextrose 50% Injectable 25 milliLiter(s) IV Push every 15 minutes  dextrose 50% Injectable 50 milliLiter(s) IV Push every 15 minutes  dronabinol 2.5 milliGRAM(s) Oral at bedtime  gabapentin 100 milliGRAM(s) Oral two times a day  HYDROmorphone  Injectable 0.2 milliGRAM(s) IV Push every 3 hours PRN  magnesium oxide 400 milliGRAM(s) Oral three times a day with meals  melatonin 3 milliGRAM(s) Oral at bedtime  methimazole 10 milliGRAM(s) Oral daily  metoclopramide Injectable 10 milliGRAM(s) IV Push three times a day  metoprolol succinate ER 25 milliGRAM(s) Oral daily  metoprolol succinate ER 50 milliGRAM(s) Oral at bedtime  mexiletine 150 milliGRAM(s) Oral every 8 hours  mirtazapine 7.5 milliGRAM(s) Oral at bedtime  multivitamin 1 Tablet(s) Oral daily  pantoprazole  Injectable 40 milliGRAM(s) IV Push two times a day  piperacillin/tazobactam IVPB.. 3.375 Gram(s) IV Intermittent every 8 hours  polyethylene glycol 3350 17 Gram(s) Oral every 12 hours  senna 2 Tablet(s) Oral at bedtime  sertraline 100 milliGRAM(s) Oral daily  sodium chloride 1 Gram(s) Oral every 8 hours  sodium chloride 0.9% lock flush 3 milliLiter(s) IV Push every 8 hours  spironolactone 25 milliGRAM(s) Oral daily  vancomycin    Solution 125 milliGRAM(s) Oral every 12 hours      Vitals:  T(C): 36.5 (22 @ 08:14), Max: 36.6 (22 @ 13:06)  HR: 88 (22 @ 08:14) (85 - 107)  BP: 84/61 (22 @ 13:06) (84/61 - 84/61)  BP(mean): 78 (22 @ 08:14) (68 - 80)  ABP: --  ABP(mean): --  RR: 18 (22 @ 08:14) (16 - 18)  SpO2: 99% (22 @ 08:14) (98% - 99%)    Daily     Daily Weight in k.1 (2022 01:14)        I&O's Summary    2022 07:01  -  2022 07:00  --------------------------------------------------------  IN: 520 mL / OUT: 1425 mL / NET: -905 mL    2022 07:01  -  2022 11:46  --------------------------------------------------------  IN: 140 mL / OUT: 0 mL / NET: 140 mL        Physical Exam:  Appearance: No Acute Distress  HEENT: PERRL  Neck: No JVD  Cardiovascular: Normal S1 S2, No murmurs/rubs/gallops  Respiratory: Clear to auscultation bilaterally  Gastrointestinal: Soft, Non-tender	  Skin: No cyanosis	  Neurologic: Non-focal  Extremities: No LE edema  Psychiatry: A & O x 3, Mood & affect appropriate    LVAD Interrogation: No alarms last 24 hours  Programming Changes: No changes made    Labs:                        8.7    11.16 )-----------( 184      ( 2022 05:34 )             27.2         128<L>  |  93<L>  |  16  ----------------------------<  87  3.9   |  23  |  1.07    Ca    9.9      2022 05:34  Phos  3.5       Mg     2.1                     TELEMETRY:    Echocardiogram:     Cardiology Fellow  Shira Morales    All Cardiology service information can be found  on [email or web address], password: [email or web address]    Interval History: No acute events overnight. Denies SOB or chest pain    Medications:  acetaminophen     Tablet .. 650 milliGRAM(s) Oral every 6 hours PRN  baclofen 5 milliGRAM(s) Oral every 8 hours  chlorhexidine 2% Cloths 1 Application(s) Topical <User Schedule>  cholecalciferol 1000 Unit(s) Oral daily  dextrose 50% Injectable 25 milliLiter(s) IV Push every 15 minutes  dextrose 50% Injectable 50 milliLiter(s) IV Push every 15 minutes  dronabinol 2.5 milliGRAM(s) Oral at bedtime  gabapentin 100 milliGRAM(s) Oral two times a day  HYDROmorphone  Injectable 0.2 milliGRAM(s) IV Push every 3 hours PRN  magnesium oxide 400 milliGRAM(s) Oral three times a day with meals  melatonin 3 milliGRAM(s) Oral at bedtime  methimazole 10 milliGRAM(s) Oral daily  metoclopramide Injectable 10 milliGRAM(s) IV Push three times a day  metoprolol succinate ER 25 milliGRAM(s) Oral daily  metoprolol succinate ER 50 milliGRAM(s) Oral at bedtime  mexiletine 150 milliGRAM(s) Oral every 8 hours  mirtazapine 7.5 milliGRAM(s) Oral at bedtime  multivitamin 1 Tablet(s) Oral daily  pantoprazole  Injectable 40 milliGRAM(s) IV Push two times a day  piperacillin/tazobactam IVPB.. 3.375 Gram(s) IV Intermittent every 8 hours  polyethylene glycol 3350 17 Gram(s) Oral every 12 hours  senna 2 Tablet(s) Oral at bedtime  sertraline 100 milliGRAM(s) Oral daily  sodium chloride 1 Gram(s) Oral every 8 hours  sodium chloride 0.9% lock flush 3 milliLiter(s) IV Push every 8 hours  spironolactone 25 milliGRAM(s) Oral daily  vancomycin    Solution 125 milliGRAM(s) Oral every 12 hours      Vitals:  T(C): 36.5 (22 @ 08:14), Max: 36.6 (22 @ 13:06)  HR: 88 (22 @ 08:14) (85 - 107)  BP: 84/61 (22 @ 13:06) (84/61 - 84/61)  BP(mean): 78 (22 @ 08:14) (68 - 80)  ABP: --  ABP(mean): --  RR: 18 (22 @ 08:14) (16 - 18)  SpO2: 99% (22 @ 08:14) (98% - 99%)    Daily     Daily Weight in k.1 (2022 01:14)        I&O's Summary    2022 07:01  -  2022 07:00  --------------------------------------------------------  IN: 520 mL / OUT: 1425 mL / NET: -905 mL    2022 07:01  -  2022 11:46  --------------------------------------------------------  IN: 140 mL / OUT: 0 mL / NET: 140 mL        Physical Exam:  Appearance: No Acute Distress  HEENT: PERRL  Neck: No JVD  Cardiovascular: Normal S1 S2, No murmurs/rubs/gallops  Respiratory: Clear to auscultation bilaterally  Gastrointestinal: Soft, Non-tender	  Skin: No cyanosis	  Neurologic: Non-focal  Extremities: No LE edema  Psychiatry: A & O x 3, Mood & affect appropriate    LVAD Interrogation: No alarms last 24 hours  Programming Changes: No changes made    Labs:                        8.7    11.16 )-----------( 184      ( 2022 05:34 )             27.2     01-21    128<L>  |  93<L>  |  16  ----------------------------<  87  3.9   |  23  |  1.07    Ca    9.9      2022 05:34  Phos  3.5       Mg     2.1     01-21                TELEMETRY:    Echocardiogram:

## 2022-01-21 NOTE — PROGRESS NOTE ADULT - ASSESSMENT
66 yo M PMH LVAD placement, history of COVID infection in April 2020, history of a prolonged hospitalization in 2021 with recurrent sepsis, pneumonia, intubated/trach x2 cycles, chronic gall bladder disease s/p perc dawn, SMA ischemia s/p stent, cachexia who was discharged to rehab but presented from [facility] on 12/13 with septic shock 2/2 COVID (+) S/P pressor support now titrated off and downgraded to the floor. Hosp course c/b serratia bacteremia on iv zosyn through 1/25, aspiration pna, MMVT due to hypokalemia, and now CT finding of splenic abscess medically managing.

## 2022-01-21 NOTE — PROGRESS NOTE ADULT - PROBLEM SELECTOR PLAN 5
-hf following  -c/w spironolactone 25 mg QD  -c/w metoprolol succinate 50 mg Qhs and 25mg qam - cannot titrate up given low BP  -replete Mg to 2-2.5, K 4-4.5  -on salt tabs for hyponatremia - monitor volume status  -EP following given multiple runs of wide complex tachycardia- c/w mexitil 150mg q8  -overall grim prognosis - palliative following, discussed w/ Dr Burgos plan to c/w symptom support and GOC  -psych following - c/w zoloft and remeron, added melatonin

## 2022-01-21 NOTE — PROGRESS NOTE ADULT - ASSESSMENT
64yo man with a history of VAD palcement, history of COVID infeciton in April 2020, history of a prolonged hospitalization in 2021 with recurrent sepsis, pneumonia, intubated/trach x2 cycles , chronic gall bladder disease s/p perc dawn, SMA ischemia reuiring a stent placement, cachecxia who was discharged to rehab but is presented from [facility] on 12/13 with AMS, hypotensive, tachycardic found to be COVID (+). Pt is lethargic and mumbling words required ICU admission and pressor support but recovered and is off oxygen on general cardiac floor    Recurrent GNR bacteremia/serratia:  stable on Zosyn therapy  likely splenic abscess- patient reluctant to permit drainage      COVID infection-   confirmed second bout of COVID by documented PCR testing  Ideally, would be interested in sequencing the virus to determine strain (omicron vs delta)  Received a dose of monoclonal antibodies Regeneron product   Completed remdesivir therapy  Completed ten days of dexamethasone  Refuses most vaccines- but will need to wait three months  to receive COVID vaccine series post monoclonal  Will administer flu/pneumonia vaccines this admission prior to discharge if he will permit      Recurrent serratia bacteremia  blood cultures clear  serratia sensitive to Zosyn continue present therapy    Likely splenic abscess 4cm collection seen on abdominal CT scan:  Likely embolic in nature  IR consult noted - risks of drainage may outweigh benefits  Patient has cleared bacteremia on current antibiotic but may recur once antibiotics are stopped  Patient voicing that he is not interested in drainage of collection      Prior severe C.diff infection   pre-emptive oral Vanco 125mg bid      Morris Prater MD  [phone]  After 5pm/weekends [phone]

## 2022-01-21 NOTE — PROGRESS NOTE ADULT - ATTENDING COMMENTS
Continues to refuse medications.   On pain meds, appetite stimulant and antidepressants as per palliative medicine.   Hemodynamically stable. Normal functioning VAD.   Prognosis remains guarded.

## 2022-01-21 NOTE — PROGRESS NOTE ADULT - PROBLEM SELECTOR PLAN 1
- Continue with speed of 9200  - s/p controller change on 12/16   -  on 1/15   - Off aspirin due to drop in Hgb. will resume when feasible   - Off coumadin due to persistent recurrent GI bleeding

## 2022-01-21 NOTE — PROGRESS NOTE ADULT - PROBLEM SELECTOR PLAN 3
resolved  COVID PCR + 12/13. Recent CXR clear, respiratory status stable on room air; s/p monoclonal antibodies Regeneron, s/p 5 day course of Remdesivir, s/p 10 day course of Dexamethasone

## 2022-01-22 NOTE — PROGRESS NOTE ADULT - ASSESSMENT
66 yo M PMH LVAD placement, history of COVID infection in April 2020, history of a prolonged hospitalization in 2021 with recurrent sepsis, pneumonia, intubated/trach x2 cycles, chronic gall bladder disease s/p perc dawn, SMA ischemia s/p stent, cachexia who was discharged to rehab but presented from [facility] on 12/13 with septic shock 2/2 COVID (+) S/P pressor support now titrated off and downgraded to the floor. Hosp course c/b serratia bacteremia on iv zosyn through 1/25, aspiration pna, MMVT due to hypokalemia, and now CT finding of splenic abscess medically managing.

## 2022-01-22 NOTE — PROGRESS NOTE ADULT - PROBLEM SELECTOR PLAN 1
History of recurrent pneumonia and bacteremia; serratia in blood in October 2021. no longer candidate for bactrim as not tolerated by pt. TTE 1/13 with no vegetations or abscesses  - blood cx 1/11 + serratia on IV zosyn 1/12 for anticipated 2 week course through 1/25 per ID  - repeat bcx 1/14 NGTD  - trend wbc and temp curve  - CT A/P showing splenic abscess 4cm in size - see section below for management

## 2022-01-22 NOTE — PROGRESS NOTE ADULT - SUBJECTIVE AND OBJECTIVE BOX
Patient is a 66y old  Male who presents with a chief complaint of COVID (+) with AMS and hypotension (22 Jan 2022 14:14)        SUBJECTIVE / OVERNIGHT EVENTS: patient denies any complaints.       MEDICATIONS  (STANDING):  baclofen 5 milliGRAM(s) Oral every 8 hours  chlorhexidine 2% Cloths 1 Application(s) Topical <User Schedule>  cholecalciferol 1000 Unit(s) Oral daily  dextrose 50% Injectable 25 milliLiter(s) IV Push every 15 minutes  dextrose 50% Injectable 50 milliLiter(s) IV Push every 15 minutes  dronabinol 2.5 milliGRAM(s) Oral at bedtime  gabapentin 100 milliGRAM(s) Oral two times a day  magnesium oxide 400 milliGRAM(s) Oral three times a day with meals  melatonin 3 milliGRAM(s) Oral at bedtime  methimazole 10 milliGRAM(s) Oral daily  metoclopramide Injectable 10 milliGRAM(s) IV Push three times a day  metoprolol succinate ER 50 milliGRAM(s) Oral at bedtime  metoprolol succinate ER 25 milliGRAM(s) Oral daily  mexiletine 150 milliGRAM(s) Oral every 8 hours  mirtazapine 7.5 milliGRAM(s) Oral at bedtime  multivitamin 1 Tablet(s) Oral daily  pantoprazole  Injectable 40 milliGRAM(s) IV Push two times a day  piperacillin/tazobactam IVPB.. 3.375 Gram(s) IV Intermittent every 8 hours  polyethylene glycol 3350 17 Gram(s) Oral every 12 hours  senna 2 Tablet(s) Oral at bedtime  sertraline 100 milliGRAM(s) Oral daily  sodium chloride 1 Gram(s) Oral every 8 hours  sodium chloride 0.9% lock flush 3 milliLiter(s) IV Push every 8 hours  spironolactone 25 milliGRAM(s) Oral daily  vancomycin    Solution 125 milliGRAM(s) Oral every 12 hours    MEDICATIONS  (PRN):  acetaminophen     Tablet .. 650 milliGRAM(s) Oral every 6 hours PRN Temp greater or equal to 38C (100.4F), Mild Pain (1 - 3), Moderate Pain (4 - 6)  HYDROmorphone  Injectable 0.2 milliGRAM(s) IV Push every 3 hours PRN Moderate to severe pain      Vital Signs Last 24 Hrs  T(C): 36.8 (22 Jan 2022 12:00), Max: 36.8 (21 Jan 2022 21:19)  T(F): 98.3 (22 Jan 2022 12:00), Max: 98.3 (21 Jan 2022 21:19)  HR: 87 (22 Jan 2022 12:00) (87 - 94)  BP: --  BP(mean): 74 (22 Jan 2022 12:00) (74 - 83)  RR: 18 (22 Jan 2022 12:00) (16 - 18)  SpO2: 96% (22 Jan 2022 12:00) (96% - 99%)  CAPILLARY BLOOD GLUCOSE      POCT Blood Glucose.: 98 mg/dL (22 Jan 2022 07:58)  POCT Blood Glucose.: 110 mg/dL (21 Jan 2022 21:37)    I&O's Summary    21 Jan 2022 07:01  -  22 Jan 2022 07:00  --------------------------------------------------------  IN: 380 mL / OUT: 1375 mL / NET: -995 mL    22 Jan 2022 07:01  -  22 Jan 2022 19:37  --------------------------------------------------------  IN: 240 mL / OUT: 50 mL / NET: 190 mL          PHYSICAL EXAM:   GENERAL: NAD, well-developed  HEAD:  Atraumatic, Normocephalic  EYES: EOMI, PERRLA, conjunctiva and sclera clear  NECK: Supple   CHEST/LUNG: reduced in bases  HEART: S1S2 normal. Regular rate and rhythm; No murmurs, rubs, or gallops  ABDOMEN: Soft, Nontender, Nondistended; Bowel sounds present  EXTREMITIES:   No clubbing, cyanosis, or edema  PSYCH/Neuro: AAOx3. Non-focal.   SKIN: No rashes or lesions      LABS:                        8.9    7.49  )-----------( 208      ( 22 Jan 2022 06:48 )             28.0     01-22    132<L>  |  98  |  16  ----------------------------<  86  4.1   |  22  |  1.04    Ca    9.8      22 Jan 2022 06:48  Phos  3.5     01-21  Mg     2.1     01-21                  RADIOLOGY & ADDITIONAL TESTS:    Imaging Personally Reviewed:  Consultant(s) Notes Reviewed:    Care Discussed with Consultants/Other Providers:

## 2022-01-22 NOTE — PROGRESS NOTE ADULT - PROBLEM SELECTOR PLAN 2
CT w/ possible splenic abscess 4 cm in size  - IR consulted - risks of draining outweigh benefits soy as bacteremia resolved  - given multiple medical chronic issues, pt would not be a surgical candidate  - IV abx as above per ID  - palliative on board for pain/symptom control - started Marinol as well

## 2022-01-22 NOTE — PROGRESS NOTE ADULT - PROBLEM SELECTOR PLAN 10
-Diet: CC diet with glucerna. -f/u RD recs.   -DVT: scd given hx of multiple GIB  -Dispo: continued symptom support/management of depression and will re-eval for fam meeting next week    overall guarded prognosis, sister Cristina was updated  Would need LVAD accepting facility when medically optimized

## 2022-01-22 NOTE — PROGRESS NOTE ADULT - PROBLEM SELECTOR PLAN 4
resolved  - patient currently comfortable on RA  - MBS passed (1/3/22) = pt now on regular consistency diet  - nutrition following

## 2022-01-22 NOTE — PROGRESS NOTE ADULT - PROBLEM SELECTOR PLAN 1
- ACC/AHA stage D systolic heart failure s/p LVAD   - stable without evidence of device malfunction  - MAP goal 70, currently off pressors  - c/w Aldactone 12.5 mg PO BID Take ibuprofen as needed for pain every 6-8 hours (with food).  Follow up with your primary care physician for re-evaluation and further work up as needed.  Return to er for any new or worsening symptoms (worsening chest/ back pain, difficulty breathing, numbness, tingling, weakness, passing out...).    Chest Pain    Chest pain can be caused by many different conditions which may or may not be dangerous. Causes include heartburn, lung infections, heart attack, blood clot in lungs, skin infections, strain or damage to muscle, cartilage, or bones, etc. In addition to a history and physical examination, an electrocardiogram (ECG) or other lab tests may have been performed to determine the cause of your chest pain. Follow up with your primary care provider or with a cardiologist as instructed.     SEEK IMMEDIATE MEDICAL CARE IF YOU HAVE ANY OF THE FOLLOWING SYMPTOMS: worsening chest pain, coughing up blood, unexplained back/neck/jaw pain, severe abdominal pain, dizziness or lightheadedness, fainting, shortness of breath, sweaty or clammy skin, vomiting, or racing heart beat. These symptoms may represent a serious problem that is an emergency. Do not wait to see if the symptoms will go away. Get medical help right away. Call 911 and do not drive yourself to the hospital.     Back Pain    Back pain is very common in adults. The cause of back pain is rarely dangerous and the pain often gets better over time. The cause of your back pain may not be known and may include strain of muscles or ligaments, degeneration of the spinal disks, or arthritis. Occasionally the pain may radiate down your leg(s). Over-the-counter medicines to reduce pain and inflammation are often the most helpful. Stretching and remaining active frequently helps the healing process.     SEEK IMMEDIATE MEDICAL CARE IF YOU HAVE ANY OF THE FOLLOWING SYMPTOMS: bowel or bladder control problems, unusual weakness or numbness in your arms or legs, nausea or vomiting, abdominal pain, fever, dizziness/lightheadedness. Take ibuprofen as needed for pain every 6-8 hours (with food).  Apply lidoderm patch to your back once a day for 1 week.  Follow up with your primary care physician for re-evaluation and further work up as needed.  Return to er for any new or worsening symptoms (worsening chest/ back pain, difficulty breathing, numbness, tingling, weakness, passing out...).    Chest Pain    Chest pain can be caused by many different conditions which may or may not be dangerous. Causes include heartburn, lung infections, heart attack, blood clot in lungs, skin infections, strain or damage to muscle, cartilage, or bones, etc. In addition to a history and physical examination, an electrocardiogram (ECG) or other lab tests may have been performed to determine the cause of your chest pain. Follow up with your primary care provider or with a cardiologist as instructed.     SEEK IMMEDIATE MEDICAL CARE IF YOU HAVE ANY OF THE FOLLOWING SYMPTOMS: worsening chest pain, coughing up blood, unexplained back/neck/jaw pain, severe abdominal pain, dizziness or lightheadedness, fainting, shortness of breath, sweaty or clammy skin, vomiting, or racing heart beat. These symptoms may represent a serious problem that is an emergency. Do not wait to see if the symptoms will go away. Get medical help right away. Call 911 and do not drive yourself to the hospital.     Back Pain    Back pain is very common in adults. The cause of back pain is rarely dangerous and the pain often gets better over time. The cause of your back pain may not be known and may include strain of muscles or ligaments, degeneration of the spinal disks, or arthritis. Occasionally the pain may radiate down your leg(s). Over-the-counter medicines to reduce pain and inflammation are often the most helpful. Stretching and remaining active frequently helps the healing process.     SEEK IMMEDIATE MEDICAL CARE IF YOU HAVE ANY OF THE FOLLOWING SYMPTOMS: bowel or bladder control problems, unusual weakness or numbness in your arms or legs, nausea or vomiting, abdominal pain, fever, dizziness/lightheadedness.

## 2022-01-23 NOTE — PROGRESS NOTE ADULT - ASSESSMENT
64 yo M PMH LVAD placement, history of COVID infection in April 2020, history of a prolonged hospitalization in 2021 with recurrent sepsis, pneumonia, intubated/trach x2 cycles, chronic gall bladder disease s/p perc dawn, SMA ischemia s/p stent, cachexia who was discharged to rehab but presented from [facility] on 12/13 with septic shock 2/2 COVID (+) S/P pressor support now titrated off and downgraded to the floor. Hosp course c/b serratia bacteremia on iv zosyn through 1/25, aspiration pna, MMVT due to hypokalemia, and now CT finding of splenic abscess medically managing.

## 2022-01-23 NOTE — PROGRESS NOTE ADULT - SUBJECTIVE AND OBJECTIVE BOX
Patient is a 66y old  Male who presents with a chief complaint of COVID (+) with AMS and hypotension (23 Jan 2022 14:15)        SUBJECTIVE / OVERNIGHT EVENTS: patient doesn't report any complaints.       MEDICATIONS  (STANDING):  baclofen 5 milliGRAM(s) Oral every 8 hours  chlorhexidine 2% Cloths 1 Application(s) Topical <User Schedule>  cholecalciferol 1000 Unit(s) Oral daily  dextrose 50% Injectable 50 milliLiter(s) IV Push every 15 minutes  dextrose 50% Injectable 25 milliLiter(s) IV Push every 15 minutes  dronabinol 2.5 milliGRAM(s) Oral at bedtime  gabapentin 100 milliGRAM(s) Oral two times a day  magnesium oxide 400 milliGRAM(s) Oral three times a day with meals  melatonin 3 milliGRAM(s) Oral at bedtime  methimazole 10 milliGRAM(s) Oral daily  metoclopramide Injectable 10 milliGRAM(s) IV Push three times a day  metoprolol succinate ER 25 milliGRAM(s) Oral daily  metoprolol succinate ER 50 milliGRAM(s) Oral at bedtime  mexiletine 150 milliGRAM(s) Oral every 8 hours  mirtazapine 7.5 milliGRAM(s) Oral at bedtime  multivitamin 1 Tablet(s) Oral daily  pantoprazole  Injectable 40 milliGRAM(s) IV Push two times a day  piperacillin/tazobactam IVPB.. 3.375 Gram(s) IV Intermittent every 8 hours  polyethylene glycol 3350 17 Gram(s) Oral every 12 hours  senna 2 Tablet(s) Oral at bedtime  sertraline 100 milliGRAM(s) Oral daily  sodium chloride 1 Gram(s) Oral every 8 hours  sodium chloride 0.9% lock flush 3 milliLiter(s) IV Push every 8 hours  spironolactone 25 milliGRAM(s) Oral daily  vancomycin    Solution 125 milliGRAM(s) Oral every 12 hours    MEDICATIONS  (PRN):  acetaminophen     Tablet .. 650 milliGRAM(s) Oral every 6 hours PRN Temp greater or equal to 38C (100.4F), Mild Pain (1 - 3), Moderate Pain (4 - 6)  HYDROmorphone  Injectable 0.2 milliGRAM(s) IV Push every 3 hours PRN Moderate to severe pain      Vital Signs Last 24 Hrs  T(C): 36.8 (23 Jan 2022 13:30), Max: 36.8 (23 Jan 2022 13:30)  T(F): 98.2 (23 Jan 2022 13:30), Max: 98.2 (23 Jan 2022 13:30)  HR: 85 (23 Jan 2022 13:30) (83 - 88)  BP: --  BP(mean): 76 (23 Jan 2022 17:30) (76 - 84)  RR: 18 (23 Jan 2022 13:30) (18 - 18)  SpO2: 98% (23 Jan 2022 13:30) (97% - 98%)  CAPILLARY BLOOD GLUCOSE        I&O's Summary    22 Jan 2022 07:01  -  23 Jan 2022 07:00  --------------------------------------------------------  IN: 240 mL / OUT: 1010 mL / NET: -770 mL    23 Jan 2022 07:01  -  23 Jan 2022 18:35  --------------------------------------------------------  IN: 560 mL / OUT: 0 mL / NET: 560 mL          PHYSICAL EXAM:   GENERAL: NAD, well-developed  HEAD:  Atraumatic, Normocephalic  EYES: EOMI, PERRLA, conjunctiva and sclera clear  NECK: Supple,    CHEST/LUNG: Clear to auscultation bilaterally; No wheeze  HEART: lvad  ABDOMEN: Soft, Nontender, Nondistended; Bowel sounds present  EXTREMITIES:  2+ Peripheral Pulses, No clubbing, cyanosis, or edema  PSYCH/Neuro: AAOx3. Non-focal.   SKIN: No rashes or lesions      LABS:                        9.9    8.87  )-----------( 227      ( 23 Jan 2022 07:24 )             30.7     01-23    132<L>  |  97  |  16  ----------------------------<  83  4.2   |  22  |  1.03    Ca    9.9      23 Jan 2022 07:24  Phos  3.5     01-23  Mg     2.0     01-23          RADIOLOGY & ADDITIONAL TESTS:    Imaging Personally Reviewed:  Consultant(s) Notes Reviewed:    Care Discussed with Consultants/Other Providers:

## 2022-01-24 NOTE — PROGRESS NOTE ADULT - SUBJECTIVE AND OBJECTIVE BOX
[facility] [place]  ID # [id number] Alyssa Childers    HPI: 67 y/o M with Stage D NICM s/p HM2 LVAD in 9/2017 at  (due to severe PAD) with TV ring, multiple GI bleeds, thus maintained off AC, CKD 3prior COVID-19 infection in 4/2020, recurrent syncope post LVAD s/p ILR, s/p prolonged complex hospitalization from 6/14-11/16/2021 initially admitted with abdominal pain complicated by GIB, respiratory failure s/p trach, multiple infections, s/p cholecystotomy tube and SMA stent 10/2021, ultimately discharged to [facility] rehab and then returned to [facility] for trach decannulation 12/2 who now presents with recurrent COVID-19 infection, initially in distributive shock. 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     He had elevated inflammatory markers which are now improving. He received monoclonal antibodies and was started on remdesivir and dexamethasone. He's afebrile with BCx from 12/18 and 12/30 NGTD. He had an episode of hypoxia noted 1/1, possibly in the setting of aspiration; however, it improved. He had runs of MMVT in setting of hypokalemia and being off his beta blocker which has since resolved. As per HF notes: "He currently appears well compensated from HF perspective. His LDH is stable. His LVAD is functioning well without s/s of pump malfunction."     Recently he was noted to have positive blood culture for serratia marcescens, currently on IV antibiotics. His Hgb continues to slowly drift down, remains off anticoagulation/ antiplatelets. He now has a decline in his appetite and is noted to have worsening abdominal pain with new CT findings suggesting Splenic Abscess. He was seen by IR that based on negative blood Cx and clinical stability is hesitant to IR drainage.     SUBJECTIVE AND OBJECTIVE: the patient indicated he was doing better. His hiccups has decreased as well as his abdominal pain. He claimed he was eating and taking most of his meds. He indicated to be weak and deconditioned and that was looking forward for ways to improve his strength. He knew he was in [place].   INTERVAL HPI/OVERNIGHT EVENTS: No new events.     DNR on chart:   Allergies    Amiodarone Hydrochloride (Other (Severe))    Intolerances    MEDICATIONS  (STANDING):  baclofen 5 milliGRAM(s) Oral every 8 hours  chlorhexidine 2% Cloths 1 Application(s) Topical <User Schedule>  cholecalciferol 1000 Unit(s) Oral daily  dextrose 50% Injectable 25 milliLiter(s) IV Push every 15 minutes  dextrose 50% Injectable 50 milliLiter(s) IV Push every 15 minutes  dronabinol 2.5 milliGRAM(s) Oral at bedtime  gabapentin 100 milliGRAM(s) Oral two times a day  magnesium oxide 400 milliGRAM(s) Oral three times a day with meals  melatonin 3 milliGRAM(s) Oral at bedtime  methimazole 10 milliGRAM(s) Oral daily  metoclopramide Injectable 10 milliGRAM(s) IV Push three times a day  metoprolol succinate ER 50 milliGRAM(s) Oral at bedtime  metoprolol succinate ER 25 milliGRAM(s) Oral daily  mexiletine 150 milliGRAM(s) Oral every 8 hours  mirtazapine 7.5 milliGRAM(s) Oral at bedtime  multivitamin 1 Tablet(s) Oral daily  pantoprazole  Injectable 40 milliGRAM(s) IV Push two times a day  piperacillin/tazobactam IVPB.. 3.375 Gram(s) IV Intermittent every 8 hours  polyethylene glycol 3350 17 Gram(s) Oral every 12 hours  senna 2 Tablet(s) Oral at bedtime  sertraline 100 milliGRAM(s) Oral daily  sodium chloride 1 Gram(s) Oral every 8 hours  sodium chloride 0.9% lock flush 3 milliLiter(s) IV Push every 8 hours  spironolactone 25 milliGRAM(s) Oral daily  vancomycin    Solution 125 milliGRAM(s) Oral every 12 hours    MEDICATIONS  (PRN):  acetaminophen     Tablet .. 650 milliGRAM(s) Oral every 6 hours PRN Temp greater or equal to 38C (100.4F), Mild Pain (1 - 3), Moderate Pain (4 - 6)  HYDROmorphone  Injectable 0.2 milliGRAM(s) IV Push every 3 hours PRN Moderate to severe pain      ITEMS UNCHECKED ARE NOT PRESENT    PRESENT SYMPTOMS: [ ]Unable to obtain due to poor mentation   Source if other than patient:  [ ]Family   [ ]Team     Pain:  [ x]yes [ ]no  QOL impact - Not feeling comfortable.   Location -                  Left flank   Aggravating factors - palpation   Quality - not able to indicate  Radiation - none   Timing- Intermittent   Severity (0-10 scale): None at this time   Minimal acceptable level (0-10 scale): Mild to     Dyspnea:                           [ ]Mild [ ]Moderate [ ]Severe  Anxiety:                             [ ]Mild [ ]Moderate [ ]Severe  Fatigue:                             [ ]Mild [ ]Moderate [ ]Severe  Nausea:                             [ ]Mild [ ]Moderate [ ]Severe  Loss of appetite:              [x ]Mild [ ]Moderate [ ]Severe  Constipation:                    [ ]Mild [ ]Moderate [ ]Severe    CPOT:    https://[email or web address]/Critical-Care-Pain-Observation-Tool-(CPOT)    PAIN AD Score:	  http://geriatrictoolkit.[email or web address]/painad.pdf (Ctrl + left click to view)    Other Symptoms:  [x ]All other review of systems negative but as per subjective above.     Palliative Performance Status Version 2:   40      %      http://npcrc.org/files/news/palliative_performance_scale_ppsv2.pdf  PHYSICAL EXAM:  Vital Signs Last 24 Hrs  T(C): 36.4 (24 Jan 2022 16:30), Max: 36.9 (23 Jan 2022 20:56)  T(F): 97.6 (24 Jan 2022 16:30), Max: 98.5 (23 Jan 2022 20:56)  HR: 84 (24 Jan 2022 16:30) (84 - 96)  BP: 94/66 (23 Jan 2022 20:56) (94/66 - 94/66)  BP(mean): 78 (24 Jan 2022 16:30) (74 - 82)  RR: 18 (24 Jan 2022 16:30) (18 - 18)  SpO2: 97% (24 Jan 2022 16:30) (95% - 99%)    GENERAL:  [x ]Alert  [ x]Oriented x 2 (person and place)  [ ]Lethargic  [x ]Cachexia  [ ]Unarousable  [x ]Verbal  [ ]Non-Verbal  Behavioral:   [ ]Anxiety  [ ]Delirium [ ]Agitation [ ]Other  HEENT:  [x ]Normal   [ ]Dry mouth   [ ]ET Tube/Trach  [ ]Oral lesions  PULMONARY:   [ ]Clear [ ]Tachypnea  [ ]Audible excessive secretions   [ ]Rhonchi        [ ]Right [ ]Left [ ]Bilateral  [ ]Crackles        [ ]Right [ ]Left [ ]Bilateral  [ ]Wheezing     [ ]Right [ ]Left [ ]Bilateral  [x ]Diminished BS [ ] Right [ ]Left [x ]Bilateral  CARDIOVASCULAR:    [x ]Regular [ ]Irregular [ ]Tachy  [ ]Jose [ ]Murmur [x ]Other: LVAD   GASTROINTESTINAL:  [ ]Soft  [ ]Distended   [x ]+BS  [ ]Non tender [x ]Tender over left flank. No Rebound or guarding  [ ]PEG [ ]OGT/ NGT   Last BM:  1/23  [x] Biliary drain   GENITOURINARY:  [x ]Normal [ ]Incontinent   [ ]Oliguria/Anuria   [ ]Landrum  MUSCULOSKELETAL:   [ ]Normal   [x ]Weakness  [ ]Bed/Wheelchair bound [ ]Edema  NEUROLOGIC:   [ ]No focal deficits  [ ] Cognitive impairment  [ ] Dysphagia [ ]Dysarthria [ ] Paresis [x ]Other:   PSYCH:  This time he was alert and with good attention. His thinking process was organized. He was able to repeat back to me info about his illness and treatment options.   SKIN:   [x ]Normal  [ ]Rash   [ ]Pressure ulcer(s) [ ]y [ ]n present on admission  Normal   CRITICAL CARE:  [ ]Shock Present  [ ]Septic [ ]Cardiogenic [ ]Neurologic [ ]Hypovolemic  [ ]Vasopressors [ ]Inotropes  [ ]Respiratory failure present [ ]Mechanical Ventilation [ ]Non-invasive ventilatory support [ ]High-Flow   [ ]Acute  [ ]Chronic [ ]Hypoxic  [ ]Hypercarbic [ ]Other  [ ]Other organ failure     LABS:                                            9.9    8.87  )-----------( 227      ( 23 Jan 2022 07:24 )             30.7   01-23    132<L>  |  97  |  16  ----------------------------<  83  4.2   |  22  |  1.03    Ca    9.9      23 Jan 2022 07:24  Phos  3.5     01-23  Mg     2.0     01-23              RADIOLOGY & ADDITIONAL STUDIES:  < from: CT Abdomen and Pelvis w/ IV Cont (01.19.22 @ 13:47) >  IMPRESSION:  *  Status post LVAD. No evidence of fluid collection adjacent to the LVAD.  *  Hypoenhancing lesion in the spleen measuring 4.0 cm, new compared to   prior examination 10/5/2021. In the appropriate clinical setting, splenic   abscess may be considered.        --- End of Report ---            MARY SUAREZ MD; Attending Radiologist  This document has been electronically signed. Jan 19 2022  3:00PM    < end of copied text >    Protein Calorie Malnutrition Present: [ ]mild [ ]moderate [ ]severe [ ]underweight [ ]morbid obesity  https://www.andeal.org/vault/2440/web/files/ONC/Table_Clinical%20Characteristics%20to%20Document%20Malnutrition-White%20JV%20et%20al%202012.pdf    Height (cm): 182.9 (12-13-21 @ 04:30), 165.1 (12-02-21 @ 13:55), 177.8 (10-18-21 @ 15:55)  Weight (kg): 52.6 (01-12-22 @ 12:50), 53.6 (12-02-21 @ 13:55), 61.6 (10-18-21 @ 15:55)  BMI (kg/m2): 15.7 (01-12-22 @ 12:50), 16 (12-13-21 @ 04:30), 19.7 (12-02-21 @ 13:55)    [ ]PPSV2 < or = 30%  [ ]significant weight loss [ ]poor nutritional intake [ ]anasarca    [ ]Artificial Nutrition    REFERRALS:   [ ]Chaplaincy  [ ]Hospice  [ ]Child Life  [ ]Social Work  [ ]Case management [ ]Holistic Therapy     Goals of Care Document:

## 2022-01-24 NOTE — PROGRESS NOTE ADULT - CONVERSATION DETAILS
I d/w the patient about his possible Dx of a Splenic abscess. He was able to give verbalized understanding about his Dx and Rx options (IR drainage vs. Abx Rx + med for symptoms). He indicated that neither option seemed to be good; however, that he was looking for the doctor's advise. I indicated I was going to f/u with IR to better understand the risks and that based on that I was going to provide him a recommendation.    He indicated his goals are for trying to improve his functionality and overall condition. He indicated he is willing to work with people in the hospital to try to improve. I d/w the patient about his possible Dx of a Splenic abscess. He was able to give verbalized understanding about his Dx and Rx options (IR drainage vs. Abx Rx + med for symptoms). After discussing about risks (IR drainage: In a simple way "major surgical complication including bleeding and pleural damage," to the point, while not acutely ill, IR is recommending to hold on it. Abx: recurrence of infection) and benefits (IR: possible resolution of abscess. Abx: No need for a risky surgical intervention), he indicated that neither option seemed to be good; however, that he was looking for the doctor's advise in order to select the best option, I indicated I was going to f/u with IR to better understand the risks and that based on that I was going to provide him with a recommendation.    He indicated his goals are for trying to improve his functionality and overall condition. He indicated he is willing to work with people in the hospital to try to improve. I d/w the patient about his possible Dx of a Splenic abscess. He was able to give verbalized understanding about his Dx and Rx options (IR drainage vs. Abx Rx + med for symptoms). After discussing about risks (IR drainage: In a simple way "major surgical complication including bleeding and pleural damage," to the point, while not acutely ill, IR is recommending to hold on it. Abx: recurrence of infection) and benefits (IR: possible resolution of abscess. Abx: No need for a risky surgical intervention), he indicated that neither option seemed to be good; however, that he was looking for the doctor's advise in order to select the best option, I indicated I was going to f/u with IR to better understand the risks and that based on that I was going to provide him with a recommendation.    He indicated his goals are for trying to improve his functionality and overall condition. He indicated he is willing to work with people in the hospital to try to improve.    46' were spent in Non face to face time during this encounter. Time in 15:00, time out 15:46

## 2022-01-24 NOTE — PROGRESS NOTE ADULT - ASSESSMENT
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 s/p HM2 LVAD in 9/2017 at  (due to severe PAD) with TV ring, multiple GI bleeds, thus maintained off AC, CKD 3prior COVID-19 infection in 4/2020, recurrent syncope post LVAD s/p ILR, s/p prolonged complex hospitalization from 6/14-11/16/2021 initially admitted with abdominal pain complicated by GIB, respiratory failure s/p trach, multiple infections, s/p cholecystotomy tube and SMA stent 10/2021, ultimately discharged to [facility] rehab and then returned to [facility] for trach decannulation 12/2 who now presents with recurrent COVID-19 infection, initially in distributive shock. 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     He had elevated inflammatory markers which are now improving. He received monoclonal antibodies and was started on remdesivir and dexamethasone. He's afebrile with BCx from 12/18 and 12/30 NGTD. He had an episode of hypoxia noted 1/1, possibly in the setting of aspiration. He is currently saturating well on RA. He had runs of MMVT in setting of hypokalemia and being off his beta blocker which has since resolved. He currently appears well compensated from HF perspective. His LDH is stable. His LVAD is functioning well without s/s of pump malfunction.     Recently he was noted to have positive blood culture for serratia marcescens. His Hgb has overall remain stable though he is off anticoagulation/ antiplatelets. He now has a decline in his appetite and is noted to have worsening abdominal pain. His prognosis is guarded, palliative care was consulted.

## 2022-01-24 NOTE — PROGRESS NOTE ADULT - PROBLEM SELECTOR PLAN 3
Continue with Toprol XL to 25mg qAM and 50 mg qPM (hold for MAP less than 70)  - continue spironolactone 25 mg daily  - Daily PT  - his overall prognosis is guarded, palliative care was reconsulted   - Family meeting was planned for today, however, cancelled per palliative medicine request.

## 2022-01-24 NOTE — PROVIDER CONTACT NOTE (OTHER) - SITUATION
Patient aspirated food, O2 dropped to 85%
Patient had 52 beats wide complex
Pt was unable to be changed to batteries. When his White power cable was connected to multiple batteries & battery clips it would have a constant  "Power cable disconnect" alarm.
Patient refusing a lot of medication throughout shift, despite education and multiple patient attempts. Patient only agreeable to taking Metoprolol, Mexitil, Zosyn, Sertraline, and sodium chloride.
Patient diagnosed with Covid >10 days ago (+ 12/12). Asymptomatic. No further isolation required.
Pt BP 89/65; ordered to give AM metoprolol and spironolactone
Pt had 31 bts of WCT
Pt tele event, WCT for 33 seconds with a few breaks 1 or 2 beats in between throughout the run.
Tele tech reports pt. had 73 beats WCT on monitor.
Pt had WCT for 25 seconds, HR up to 170's
Pt with history of HM 2 implanted on 9/2017 admitted to COVID ICU from [facility] for tachycardia, hypotension, and fever.
Notified by tele tech of 15 seconds run of vtach, around 37 beats
Notified by tele tech patient sustaining 15 beats of WCT, 21 beats overnight. Noted pt refused medications in am
Patient had 16 beats WCT, then 22 beats WCT
Patient refusing most medications. Risks vs benefits explained. Still refusing. A & O x4.
inappropriate pacing on tele
Pt tele event. 8 beats WCT followed by 10 beats WCT.
pt refusing am doses of k phos PO, vanco PO, baclofen
Patient refusing a lot of medication throughout shift
Pt BP 83/62 MAP 69
Pt had 21 beats WCT on telemetry
Pt had 9 beats WCT on tele.
Pt hypotensive BP 85/64, MAP 71 and pt due for AM metoprolol
Pt refusing baclofen and vancomycin

## 2022-01-24 NOTE — PROVIDER CONTACT NOTE (OTHER) - BACKGROUND
Pt s/p HM2 LVAD implantation 9/12/17. He has a long standing medical hx. He is currently admitted 2/2 COVID Pt is currently off AC 2/2 persistent GI bleeds. He is currently on Lovenox & 81mg ASA
Patient admitted with sepsis
Pt admitted for COVID-19
Pt admitted for sepsis
Pt admitted for sepsis and COVID-19
Pt admitting dx bacteremia
Pt admitting dx bacteremia
Dx LVAD and sepsis
Patient admitted with Sepsis
Patient admitted with sepsis
patient admitted for sepsis
Pt admitted for sepsis. Pt has LVAD Heartmate II.
Pt. has had up to 111 beats of WCT on 12/20.
Patient admitted with sepsis
Pt admitted for sepsis
Pt admitted for sepsis. Pt had LVAD Heartmate II.
Patient admitted with sepsis
Pt recently d/c from [facility] 12/6 s/p trach decannulation to home. Found at home by family to be lethargic and warm. Pt sent to [facility] and found to be COVID + and febrile. Transferred to [facility].
Pt admitted for sepsis. Pt has LVAD Heartmate II

## 2022-01-24 NOTE — PROGRESS NOTE ADULT - PROBLEM SELECTOR PLAN 2
CT w/ possible splenic abscess 4 cm in size  IR consulted - risks of draining outweigh benefits soy as bacteremia resolved  given multiple medical chronic issues, pt would not be a surgical candidate  IV abx as above per ID  Palliative on board for pain/symptom control - started Marinol as well

## 2022-01-24 NOTE — PROGRESS NOTE ADULT - PROBLEM SELECTOR PLAN 6
Will continue to f/u for Symptoms and GOC.           Francisco Burgos MD   Geriatrics and Palliative Care ([facility]) Consult Service    of Geriatric and Palliative Medicine  [facility]      Please page the following number for clinical matters between the hours of 9 am and 5 pm from Monday through Friday : [phone]    After 5pm and on weekends, please see the contact information below:    In the event of newly developing, evolving, or worsening symptoms, please contact the Palliative Medicine team via pager (if the patient is at [facility] #[phone] or if the patient is at [facility] #[phone]) The Geriatric and Palliative Medicine service has coverage 24 hours a day/ 7 days a week to provide medical recommendations regarding symptom management needs via telephone

## 2022-01-24 NOTE — PROGRESS NOTE ADULT - PROBLEM SELECTOR PLAN 4
On 1/21, I d/w Dr. Prater that indicated spleen abscess may be a cause for the patient's recurrent bacteremia. On my end it may also be causing his hiccups. However, while on Abx, Reglan, Baclofren, and Gabapentin infections and symptoms appear to be controlled. Today, I d/w IR, Dr. Bruce that indicated due to the location of the abscess and understanding the patient's hemodynamic stability and limited symptomatic burden (since symptoms are significantly better controlled today), IR drainage presents more risks than a benefit, at this time. Hence, currently, IV Abx rather than IR drainage is a better option. Will f/u with the patient about IR's inputs tomorrow.   See  GOC above.

## 2022-01-24 NOTE — PROGRESS NOTE ADULT - SUBJECTIVE AND OBJECTIVE BOX
Subjective:    Medications:  acetaminophen     Tablet .. 650 milliGRAM(s) Oral every 6 hours PRN  baclofen 5 milliGRAM(s) Oral every 8 hours  chlorhexidine 2% Cloths 1 Application(s) Topical <User Schedule>  cholecalciferol 1000 Unit(s) Oral daily  dextrose 50% Injectable 25 milliLiter(s) IV Push every 15 minutes  dextrose 50% Injectable 50 milliLiter(s) IV Push every 15 minutes  dronabinol 2.5 milliGRAM(s) Oral at bedtime  gabapentin 100 milliGRAM(s) Oral two times a day  HYDROmorphone  Injectable 0.2 milliGRAM(s) IV Push every 3 hours PRN  magnesium oxide 400 milliGRAM(s) Oral three times a day with meals  melatonin 3 milliGRAM(s) Oral at bedtime  methimazole 10 milliGRAM(s) Oral daily  metoclopramide Injectable 10 milliGRAM(s) IV Push three times a day  metoprolol succinate ER 50 milliGRAM(s) Oral at bedtime  metoprolol succinate ER 25 milliGRAM(s) Oral daily  mexiletine 150 milliGRAM(s) Oral every 8 hours  mirtazapine 7.5 milliGRAM(s) Oral at bedtime  multivitamin 1 Tablet(s) Oral daily  pantoprazole  Injectable 40 milliGRAM(s) IV Push two times a day  piperacillin/tazobactam IVPB.. 3.375 Gram(s) IV Intermittent every 8 hours  polyethylene glycol 3350 17 Gram(s) Oral every 12 hours  senna 2 Tablet(s) Oral at bedtime  sertraline 100 milliGRAM(s) Oral daily  sodium chloride 1 Gram(s) Oral every 8 hours  sodium chloride 0.9% lock flush 3 milliLiter(s) IV Push every 8 hours  spironolactone 25 milliGRAM(s) Oral daily  vancomycin    Solution 125 milliGRAM(s) Oral every 12 hours      Physical Exam:    Vitals:  T(C): 36.7 (01-24-22 @ 08:15), Max: 36.9 (01-23-22 @ 20:56)  HR: 85 (01-24-22 @ 08:15) (84 - 96)  BP: 94/66 (01-23-22 @ 20:56) (94/66 - 94/66)  BP(mean): 80 (01-24-22 @ 08:15) (74 - 84)    Vital Signs Last 24 Hrs  T(C): 36.7 (24 Jan 2022 08:15), Max: 36.9 (23 Jan 2022 20:56)  T(F): 98 (24 Jan 2022 08:15), Max: 98.5 (23 Jan 2022 20:56)  HR: 85 (24 Jan 2022 08:15) (84 - 96)  BP: 94/66 (23 Jan 2022 20:56) (94/66 - 94/66)  BP(mean): 80 (24 Jan 2022 08:15) (74 - 84)  RR: 18 (24 Jan 2022 08:15) (18 - 18)  SpO2: 95% (24 Jan 2022 08:15) (95% - 98%)      I&O's Summary    23 Jan 2022 07:01  -  24 Jan 2022 07:00  --------------------------------------------------------  IN: 560 mL / OUT: 405 mL / NET: 155 mL        General: No distress. Comfortable.  HEENT: EOM intact.  Neck: Neck supple. JVP not elevated. No masses  Chest: Clear to auscultation bilaterally  CV: Normal S1 and S2. No murmurs, rub, or gallops. Radial pulses normal.  Abdomen: Soft, non-distended, non-tender  Skin: No rashes or skin breakdown  Neurology: Alert and oriented times three. Sensation intact  Psych: Affect normal    Labs:                        9.9    8.87  )-----------( 227      ( 23 Jan 2022 07:24 )             30.7     01-23    132<L>  |  97  |  16  ----------------------------<  83  4.2   |  22  |  1.03    Ca    9.9      23 Jan 2022 07:24  Phos  3.5     01-23  Mg     2.0     01-23                Lactate Dehydrogenase, Serum: 270 U/L (01-24 @ 06:49)

## 2022-01-24 NOTE — PROGRESS NOTE ADULT - ASSESSMENT
65M PMH LVAD, recurrent sepsis, pneumonia, intubated/trach x2 cycles, chronic gall bladder disease s/p percutaneous cholecystectomy, SMA ischemia s/p stent, cachexia who presented from [facility] on 12/13 with septic shock 2/2 COVID (+) S/P pressor support now titrated off and downgraded to the floor. Hosp course c/b serratia bacteremia on IV Zosyn through 1/25, aspiration PNA, VT due to hypokalemia, and now CT finding of splenic abscess medically managing.

## 2022-01-24 NOTE — PROGRESS NOTE ADULT - SUBJECTIVE AND OBJECTIVE BOX
Mich Krueger MD  Pager [phone]  Spectra [phone]  Cell Phone [phone]    Patient is a 66y old  Male who presents with a chief complaint of COVID (+) with AMS and hypotension (24 Jan 2022 09:33)        SUBJECTIVE / OVERNIGHT EVENTS: Patient feels well this am. Denies pain  TELEMETRY: SR 80-90's      MEDICATIONS  (STANDING):  baclofen 5 milliGRAM(s) Oral every 8 hours  chlorhexidine 2% Cloths 1 Application(s) Topical <User Schedule>  cholecalciferol 1000 Unit(s) Oral daily  dextrose 50% Injectable 25 milliLiter(s) IV Push every 15 minutes  dextrose 50% Injectable 50 milliLiter(s) IV Push every 15 minutes  dronabinol 2.5 milliGRAM(s) Oral at bedtime  gabapentin 100 milliGRAM(s) Oral two times a day  magnesium oxide 400 milliGRAM(s) Oral three times a day with meals  melatonin 3 milliGRAM(s) Oral at bedtime  methimazole 10 milliGRAM(s) Oral daily  metoclopramide Injectable 10 milliGRAM(s) IV Push three times a day  metoprolol succinate ER 50 milliGRAM(s) Oral at bedtime  metoprolol succinate ER 25 milliGRAM(s) Oral daily  mexiletine 150 milliGRAM(s) Oral every 8 hours  mirtazapine 7.5 milliGRAM(s) Oral at bedtime  multivitamin 1 Tablet(s) Oral daily  pantoprazole  Injectable 40 milliGRAM(s) IV Push two times a day  piperacillin/tazobactam IVPB.. 3.375 Gram(s) IV Intermittent every 8 hours  polyethylene glycol 3350 17 Gram(s) Oral every 12 hours  senna 2 Tablet(s) Oral at bedtime  sertraline 100 milliGRAM(s) Oral daily  sodium chloride 1 Gram(s) Oral every 8 hours  sodium chloride 0.9% lock flush 3 milliLiter(s) IV Push every 8 hours  spironolactone 25 milliGRAM(s) Oral daily  vancomycin    Solution 125 milliGRAM(s) Oral every 12 hours    MEDICATIONS  (PRN):  acetaminophen     Tablet .. 650 milliGRAM(s) Oral every 6 hours PRN Temp greater or equal to 38C (100.4F), Mild Pain (1 - 3), Moderate Pain (4 - 6)  HYDROmorphone  Injectable 0.2 milliGRAM(s) IV Push every 3 hours PRN Moderate to severe pain      Vital Signs Last 24 Hrs  T(C): 36.3 (24 Jan 2022 12:28), Max: 36.9 (23 Jan 2022 20:56)  T(F): 97.4 (24 Jan 2022 12:28), Max: 98.5 (23 Jan 2022 20:56)  HR: 88 (24 Jan 2022 12:28) (84 - 96)  BP: 94/66 (23 Jan 2022 20:56) (94/66 - 94/66)  BP(mean): 82 (24 Jan 2022 12:28) (74 - 84)  RR: 18 (24 Jan 2022 12:28) (18 - 18)  SpO2: 99% (24 Jan 2022 12:28) (95% - 99%)  CAPILLARY BLOOD GLUCOSE        I&O's Summary    23 Jan 2022 07:01  -  24 Jan 2022 07:00  --------------------------------------------------------  IN: 560 mL / OUT: 405 mL / NET: 155 mL    24 Jan 2022 07:01  -  24 Jan 2022 12:49  --------------------------------------------------------  IN: 240 mL / OUT: 150 mL / NET: 90 mL          PHYSICAL EXAM  GENERAL: NAD, cachetic  HEAD:  Atraumatic, normocephalic  EYES: EOMI, PERRLA, conjunctiva and sclera clear  CHEST/LUNG: Clear to auscultation bilaterally; no wheezes  HEART: + LVAD hum  ABDOMEN: Soft, nontender, nondistended; bowel sounds present; +R sided driveline; +R sided cholecystostomy tube with biliary drainage  EXTREMITIES:  2+ peripheral pulses; no clubbing, cyanosis, or edema  PSYCH: AAOx3      LABS:                        9.9    8.87  )-----------( 227      ( 23 Jan 2022 07:24 )             30.7     01-23    132<L>  |  97  |  16  ----------------------------<  83  4.2   |  22  |  1.03    Ca    9.9      23 Jan 2022 07:24  Phos  3.5     01-23  Mg     2.0     01-23                  RADIOLOGY & ADDITIONAL TESTS:    Imaging Personally Reviewed:  Consultant(s) Notes Reviewed:    Care Discussed with Consultants/Other Providers:

## 2022-01-24 NOTE — PROGRESS NOTE ADULT - PROBLEM SELECTOR PLAN 2
hx of Serratia bacteremia, blood cultures 1/14 NGTD  WBC downtrending   - currently on IV zosyn and PO Vancomycin  - ID following  - continue with Vanco PO for C. Diff ppx (Pt refusing)  - CT C/A/P 1/19 shows hypoenhancing lesion in the spleen measuring 4.0 cm.   - IR consulted, as the patient is not acutely ill at the moment with cleared Bcx, holding off on intervention for now. Risks outweigh benefits.

## 2022-01-24 NOTE — PROGRESS NOTE ADULT - ASSESSMENT
67 y/o M with Stage D NICM s/p HM2 LVAD in 9/2017 at  (due to severe PAD) with TV ring, multiple GI bleeds, thus maintained off AC, CKD 3prior COVID-19 infection in 4/2020, recurrent syncope post LVAD s/p ILR, s/p prolonged complex hospitalization from 6/14-11/16/2021 initially admitted with abdominal pain complicated by GIB, respiratory failure s/p trach, multiple infections, s/p cholecystotomy tube and SMA stent 10/2021, ultimately discharged to [facility] rehab and then returned to [facility] for trach decannulation 12/2 who now presents with recurrent COVID-19 infection, initially in distributive shock. 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 Though from the COVID and respiratory point of view, his condition has improved, he now has a decline in his appetite, is refusing to take certain medications and is noted to have worsening abdominal pain with new CT findings suggesting Splenic Abscess. He was seen by IR that based on negative blood Cx and clinical stability is hesitant to IR drainage.

## 2022-01-24 NOTE — PROGRESS NOTE ADULT - PROBLEM SELECTOR PLAN 1
s/p controller change on 12/16   -  on 1/15   - Off aspirin due to drop in Hgb. will resume when feasible   - Off coumadin due to persistent recurrent GI bleeding.

## 2022-01-24 NOTE — PROGRESS NOTE ADULT - PROBLEM SELECTOR PLAN 1
Blood cx 1/11 + serratia on IV Zosyn for 2 weeks -> 1/25 per ID  CT A/P showing splenic abscess 4cm in size - see section below for management

## 2022-01-24 NOTE — PROGRESS NOTE ADULT - PROBLEM SELECTOR PLAN 1
Significantly improved   Continue Reglan 10mg IV q 8ATC   Continue Baclofen 5mg PO q 8ATC   Gabapentin 100mg PO bid Significantly improved   Continue Reglan 10mg IV q 8ATC and if continuing to be stable, can try to switch to Reglan 10mg PO q8 hours.   Continue Baclofen 5mg PO q 8ATC   Gabapentin 100mg PO bid

## 2022-01-24 NOTE — CHART NOTE - NSCHARTNOTEFT_GEN_A_CORE
Nutrition Follow Up Note  Patient seen for: Nutrition consult warranted for Assessment/Education    Chart reviewed, events noted. Per chart: 66M, PMH of Stage D NICM s/p HM2 LVAD (2017), multiple GI bleeds (thus maintained off AC), CKD 3, prior COVID-19 infection (2020), s/p prolonged complex hospitalization from -2021 initially admitted with abdominal pain c/b GIB, respiratory failure s/p trach, multiple infections, s/p cholecystotomy tube and SMA stent 10/2021, ultimately discharged to [facility] rehab and then returned to [facility] for trach decannulation  who now presents with recurrent COVID-19 infection in shock. 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  He had an episode of hypoxia noted , possibly in the setting of aspiration. Now CT finding of splenic abscess medically managing.     Source: EMR, Team; PENDING Pt INTERVIEW    Diet, Consistent Carbohydrate w/Evening Snack:   Supplement Feeding Modality:  Oral  Glucerna Shake Cans or Servings Per Day:  6       Frequency:  Daily (22 @ 08:13)    Is current diet order appropriate/adequate? [x] Yes  []  No: however, pt has been with prolonged suboptimal PO intake, consuming <25% at meals.     Nutrition-Related Events:  - Marinol added  in efforts to increase appetite; Remeron ordered since admit   - Prolonged hyponatremia - NaCl tabs ordered daily   - Multivitamin, MagOx, and Vitamin D3 supplementation ordered daily  - MBS 1/3 with SLP recommendations for "Regular diet and thin liquids"  - Swallow eval  with SLP recommendations for "Regular solids and thin liquids"  - Upon prior hospitalization was with EN for prolonged period and then advanced to PO diet but was with some dysphagia.    GI:  Last documented BM .   Bowel Regimen? [x] Yes (miralax, senna)  [] No  - Noted on abx course at this time  - Reglan ordered since admit  for gut motility  - Sybil drain remains in place since PTA; output: 395ml (), 300ml (), 100ml ()    Daily Weights in K.3 (), 54.1 (), 53.4 (), 52.4 (), 52.8 (), 52.9 (), 52.8 (01-15), 66.5 ( ? accuracy), 51.1 (), 50.1 (), 54.6 (), 55.7 (12-15)  - Noted weight has downtrended since admit; likely multifactorial as pt has been receiving aldactone as well as with suboptimal PO intake at times. Will continue to monitor/trend weight status     Drug Dosing Weight  Weight (kg): 55.7 ()  BMI (kg/m2): 16.7 ()  IBW: 178 Ibs/80.7 kg    MEDICATIONS  (STANDING):  cholecalciferol  dextrose 50% Injectable  dextrose 50% Injectable  magnesium oxide  methimazole  metoprolol succinate ER  metoprolol succinate ER  mexiletine  multivitamin  pantoprazole  Injectable  piperacillin/tazobactam IVPB..  polyethylene glycol 3350  senna  sodium chloride  sodium chloride 0.9% lock flush  spironolactone  vancomycin    Solution    Pertinent Labs:  @ 07:24: Na 132<L>, BUN 16, Cr 1.03, BG 83, K+ 4.2, Phos 3.5, Mg 2.0, Alk Phos --, ALT/SGPT --, AST/SGOT --, HbA1c --    A1C with Estimated Average Glucose Result: 5.4 % (08-15-21 @ 13:52)    Finger Sticks:    Skin per nursing documentation: no pressure injuries noted  Edema: none    Estimated Nutritional Needs  Based on 12/15 weight: 122.7 lb/55.6 kg  Energy Needs (30-35 kcals/kg): 8087-3111  Protein Needs (1.5-1.8 g/kg): 83.4-100.08    Previous Nutrition Diagnosis: Severe-chronic Malnutrition & Underweight  Nutrition Diagnosis is: [x] ongoing - addressed with PO diet, oral nutrition and micronutrient supplementation    New Nutrition Diagnosis: [x] Not applicable    Nutrition Care Plan:  [x] In Progress  [] Achieved  [] Not applicable    Recommendations:      1. Continue Current diet as ordered with Glucerna Shake  - RD to continue providing Magic Cup and dietary preferences as feasible   2. Continue micronutrient supplementation as ordered; recommend thiamin supplementation in setting of prolonged suboptimal PO intake - Pt at risk for refeeding syndrome.  3. Continue with appetite stimulants at discretion of team  4. Pending need for EN (if within GOC) would recommend initiating Jevity 1.2 @10ml/hr x 24hrs; RD to follow-up and reassess pending EN initiation as pt at high risk for developing refeeding syndrome  5. Continue to provide assistance as necessary with encouragement of PO intake at meals/snacks      Monitoring and Evaluation:   Continue to monitor nutritional intake, tolerance to diet prescription, weights, labs, skin integrity  RD remains available upon request and will follow up per protocol    Wendy Travis, MS, RD, CDN, [facility]  pager #[phone] Nutrition Follow Up Note  Patient seen for: Nutrition consult warranted for Assessment/Education    Chart reviewed, events noted. Per chart: 66M, PMH of Stage D NICM s/p HM2 LVAD (2017), multiple GI bleeds (thus maintained off AC), CKD 3, prior COVID-19 infection (2020), s/p prolonged complex hospitalization from -2021 initially admitted with abdominal pain c/b GIB, respiratory failure s/p trach, multiple infections, s/p cholecystotomy tube and SMA stent 10/2021, ultimately discharged to [facility] rehab and then returned to [facility] for trach decannulation  who now presents with recurrent COVID-19 infection in shock. 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  He had an episode of hypoxia noted , possibly in the setting of aspiration. Now CT finding of splenic abscess medically managing.     Source: EMR, Team, Pt    Diet, Consistent Carbohydrate w/Evening Snack:   Supplement Feeding Modality:  Oral  Glucerna Shake Cans or Servings Per Day:  6       Frequency:  Daily (22 @ 08:13)    Is current diet order appropriate/adequate? [x] Yes  []  No: however, pt has been with prolonged suboptimal PO intake, consuming <50% at meals. Pt admits he is consuming 1-2 Glucerna shakes daily & reports consuming Magic cup at least 1x daily. RN notes fluctuating intake at meals; Further discussed dietary options with pt. Noted upon prior visits pt was with increased intake when on pureed diet - discussed option with pt who was agreeable to trial puree diet again as he noted sometimes he is exerting more energy to try and consume regular textured diet - team made aware. RD to follow-up. Noted pt's increased nutritional needs at this time and ways to optimize nutritional intake while with decreased appetite. Discussed consuming small frequent meals, keeping nonperishable food items at bedside to consume throughout the day, and drinking oral nutrition supplement between meals.     Nutrition-Related Events:  - Marinol added  in efforts to increase appetite; Remeron ordered since admit   - Prolonged hyponatremia - NaCl tabs ordered daily   - Multivitamin, MagOx, and Vitamin D3 supplementation ordered daily  - [facility] 1/3 with SLP recommendations for "Regular diet and thin liquids"  - Swallow eval  with SLP recommendations for "Regular solids and thin liquids"  - Upon prior hospitalization was with EN for prolonged period and then advanced to PO diet but was with some dysphagia.    GI:  Last documented BM .   Bowel Regimen? [x] Yes (miralax, senna)  [] No  - Noted on abx course at this time  - Reglan ordered since admit  for gut motility  - Sybil drain remains in place since PTA; output: 395ml (), 300ml (), 100ml ()    Daily Weights in K.3 (), 54.1 (), 53.4 (), 52.4 (), 52.8 (), 52.9 (), 52.8 (01-15), 66.5 ( ? accuracy), 51.1 (), 50.1 (), 54.6 (), 55.7 (12-15)  - Noted weight has downtrended since admit; likely multifactorial as pt has been receiving aldactone as well as with suboptimal PO intake at times. Will continue to monitor/trend weight status     Drug Dosing Weight  Weight (kg): 55.7 ()  BMI (kg/m2): 16.7 ()  IBW: 178 Ibs/80.7 kg    MEDICATIONS  (STANDING):  cholecalciferol  dextrose 50% Injectable  dextrose 50% Injectable  magnesium oxide  methimazole  metoprolol succinate ER  metoprolol succinate ER  mexiletine  multivitamin  pantoprazole  Injectable  piperacillin/tazobactam IVPB..  polyethylene glycol 3350  senna  sodium chloride  sodium chloride 0.9% lock flush  spironolactone  vancomycin    Solution    Pertinent Labs:  @ 07:24: Na 132<L>, BUN 16, Cr 1.03, BG 83, K+ 4.2, Phos 3.5, Mg 2.0, Alk Phos --, ALT/SGPT --, AST/SGOT --, HbA1c --    A1C with Estimated Average Glucose Result: 5.4 % (08-15-21 @ 13:52)    Finger Sticks:    Skin per nursing documentation: no pressure injuries noted  Edema: none    Estimated Nutritional Needs  Based on 12/15 weight: 122.7 lb/55.6 kg  Energy Needs (30-35 kcals/kg): 8667-6705  Protein Needs (1.5-1.8 g/kg): 83.4-100.08    Previous Nutrition Diagnosis: Severe-chronic Malnutrition & Underweight  Nutrition Diagnosis is: [x] ongoing - addressed with PO diet, oral nutrition and micronutrient supplementation    New Nutrition Diagnosis: [x] Not applicable    Nutrition Care Plan:  [x] In Progress  [] Achieved  [] Not applicable    Recommendations:      1.Recommend changing diet to Regular, Pureed diet with Glucerna Shake TID as per pt request.  - RD to continue providing Magic Cup and dietary preferences as feasible   2. Continue micronutrient supplementation as ordered; recommend thiamin supplementation in setting of prolonged suboptimal PO intake - Pt at risk for refeeding syndrome.  3. Continue with appetite stimulants at discretion of team  4. Pending need for EN (if within GOC) would recommend initiating Jevity 1.2 @10ml/hr x 24hrs; RD to follow-up and reassess pending EN initiation as pt at high risk for developing refeeding syndrome  5. Continue to provide assistance as necessary with encouragement of PO intake at meals/snacks    Monitoring and Evaluation:   Continue to monitor nutritional intake, tolerance to diet prescription, weights, labs, skin integrity  RD remains available upon request and will follow up per protocol    Wendy Travis, MS, RD, CDN, CNSC  pager #[phone]

## 2022-01-24 NOTE — PROVIDER CONTACT NOTE (OTHER) - ASSESSMENT
Pt A&Ox4, BP 83/62 MAP 69. Pt asymptomatic, no c/o chest pain or dizziness.
Pt A&Ox4, BP 89/65, MAP 73. Pt asymptomatic, no c/o SOB, CP, or dizziness.
Pt AOx3/4, pt verbalized feeling of chest palpitations which quickly resolved after. Vitals charted linked to this note
Pt Aox3, making verbal c/o chest pressure on the R side of chest, feeling of weakness and dizziness
Pt sleeping comfortably during tele event. No complaints of chest pain or discomfort at this time. MAP:82, HR:90
Pt. asleep and asymptomatic of event. VSS.
patient is asymptomatic; sleeping comfortably in bed; VSS
pt asymptomatic, VS stable
Pt A&Ox4, BP 85/64, MAP 71, . Pt asymptomatic, no c/o dizziness, chest pain, SOB.
VSS, pt asymptomatic, no complaints of SOB, chest pain, dizziness, or palpitations.
VSS. Pt denies CP and SOB
Asymptomatic.   VSS.  LVAD wnl.
Pt A&Ox4, asymptomatic; no c/o CP, SOB, or palpitations. MAP 76, HR 82.
Pt is A+Ox4, explained risks vs benefits, pt continues to refuse, provider contacted
Pt is having constant Power cable disconnect alarms. The white power cable bend relief is worn and frayed. Pt has no s/s of distress and is resting comfortably in the chair during alarms. LDH slightly trending up.
VS as charted.
VSS
VSS, pt asymptomatic, no complaints of SOB, chest pain, dizziness, or palpitations.
pt asymptomatic
Pt A&Ox4, no complaints of chest pain or shortness of breath. MAP: 68 (auscultated via doppler), HR: 100.
See RRT documents
Upon assessment pt found to have questionable wire exposure on white power connecter to . Pt able to safely taken off battery to be placed on power source.
VSS, pt asymptomatic, no complaints of SOB, chest pain, dizziness, or palpitations.

## 2022-01-24 NOTE — PROVIDER CONTACT NOTE (OTHER) - DATE AND TIME:
12-Jan-2022 09:10
19-Jan-2022 08:45
19-Jan-2022 13:56
21-Jan-2022 13:15
22-Dec-2021 10:02
23-Dec-2021 05:10
05-Jan-2022 06:56
11-Jan-2022 07:47
11-Jan-2022 05:11
13-Dec-2021 01:00
27-Dec-2021 22:12
01-Jan-2022 18:00
17-Jan-2022 05:00
17-Jan-2022 18:01
15-Norman-2022 12:10
20-Jan-2022 10:15
24-Jan-2022 18:43
27-Dec-2021 05:47
16-Jan-2022 09:56
20-Jan-2022 18:04
17-Dec-2021 17:45
18-Jan-2022 23:06
31-Dec-2021 05:35
24-Dec-2021 16:00

## 2022-01-24 NOTE — PROGRESS NOTE ADULT - PROBLEM SELECTOR PLAN 5
With LVAD  Continue spironolactone 25 mg QD  Continue metoprolol succinate 25 mg am and 50 mg QHS - cannot titrate up given low BP  Continue salt tabs for hyponatremia   Multiple runs of WCT- continue Mexitil 150mg q8  Very poor prognosis- palliative following, plan to continue symptom support and GOC  Psych following- continue zoloft and Remeron, added melatonin  Off ASA due to downtrend in hgb

## 2022-01-24 NOTE — PROGRESS NOTE ADULT - ATTENDING COMMENTS
Patient with persistent abdominal pain but somewhat improved. Appears very weak. On exam, cachectic, A&Ox1 (doesn't know precise year/month), no JVP, VAD hum, CTAB, nontender abdomen, cholecystotomy drain, no pedal edema. Labs reviewed. VAD interrogated - PI events. Overall stage D HF s/p HM2 as DT (due to peripheral arterial disease) with overall failure to thrive and recurrent Serratia bacteremia likely with LVAD infection.   - prognosis guarded; ongoing GOC  - c/w HF meds  - will need to arrange family meeting

## 2022-01-24 NOTE — PROGRESS NOTE ADULT - PROBLEM SELECTOR PLAN 3
Resolved  s/p monoclonal antibodies Regeneron, s/p 5 day course of Remdesivir, s/p 10 day course of Dexamethasone

## 2022-01-25 NOTE — PROGRESS NOTE ADULT - PROBLEM SELECTOR PLAN 7
- continues to have poor appetite and abdominal pain   - nutrition following   - please start formal calorie count today

## 2022-01-25 NOTE — PROGRESS NOTE ADULT - PROBLEM SELECTOR PLAN 4
- history of thyrotoxicosis with amiodarone, on methimazole  - EP following, continue Mexiletine 150 mg PO TID   - continue BB as above  - unable to uptitrate BB due to MAPs, and unable to use amio   - Electrolyte repletion to maintain K 4-4.5 and Mg 2-2.5.

## 2022-01-25 NOTE — PROGRESS NOTE ADULT - SUBJECTIVE AND OBJECTIVE BOX
Mich Krueger MD  Pager [phone]  Spectra [phone]  Cell Phone [phone]    Patient is a 66y old  Male who presents with a chief complaint of COVID (+) with AMS and hypotension (25 Jan 2022 07:03)        SUBJECTIVE / OVERNIGHT EVENTS: No new events  TELEMETRY: SR 80-90      MEDICATIONS  (STANDING):  baclofen 5 milliGRAM(s) Oral every 8 hours  chlorhexidine 2% Cloths 1 Application(s) Topical <User Schedule>  cholecalciferol 1000 Unit(s) Oral daily  dextrose 50% Injectable 25 milliLiter(s) IV Push every 15 minutes  dextrose 50% Injectable 50 milliLiter(s) IV Push every 15 minutes  dronabinol 2.5 milliGRAM(s) Oral at bedtime  gabapentin 100 milliGRAM(s) Oral two times a day  magnesium oxide 400 milliGRAM(s) Oral three times a day with meals  melatonin 3 milliGRAM(s) Oral at bedtime  methimazole 10 milliGRAM(s) Oral daily  metoclopramide Injectable 10 milliGRAM(s) IV Push three times a day  metoprolol succinate ER 50 milliGRAM(s) Oral at bedtime  metoprolol succinate ER 25 milliGRAM(s) Oral daily  mexiletine 150 milliGRAM(s) Oral every 8 hours  mirtazapine 7.5 milliGRAM(s) Oral at bedtime  multivitamin 1 Tablet(s) Oral daily  pantoprazole  Injectable 40 milliGRAM(s) IV Push two times a day  piperacillin/tazobactam IVPB.. 3.375 Gram(s) IV Intermittent every 8 hours  polyethylene glycol 3350 17 Gram(s) Oral every 12 hours  senna 2 Tablet(s) Oral at bedtime  sertraline 100 milliGRAM(s) Oral daily  sodium chloride 1 Gram(s) Oral every 8 hours  sodium chloride 0.9% lock flush 3 milliLiter(s) IV Push every 8 hours  spironolactone 25 milliGRAM(s) Oral daily  vancomycin    Solution 125 milliGRAM(s) Oral every 12 hours    MEDICATIONS  (PRN):  acetaminophen     Tablet .. 650 milliGRAM(s) Oral every 6 hours PRN Temp greater or equal to 38C (100.4F), Mild Pain (1 - 3), Moderate Pain (4 - 6)  HYDROmorphone  Injectable 0.2 milliGRAM(s) IV Push every 3 hours PRN Moderate to severe pain      Vital Signs Last 24 Hrs  T(C): 36.3 (25 Jan 2022 12:25), Max: 36.7 (25 Jan 2022 00:11)  T(F): 97.4 (25 Jan 2022 12:25), Max: 98.1 (25 Jan 2022 00:11)  HR: 74 (25 Jan 2022 12:25) (74 - 90)  BP: --  BP(mean): 82 (25 Jan 2022 12:25) (72 - 82)  RR: 18 (25 Jan 2022 12:25) (18 - 18)  SpO2: 98% (25 Jan 2022 12:25) (97% - 98%)  CAPILLARY BLOOD GLUCOSE        I&O's Summary    24 Jan 2022 07:01  -  25 Jan 2022 07:00  --------------------------------------------------------  IN: 600 mL / OUT: 1035 mL / NET: -435 mL    25 Jan 2022 07:01  -  25 Jan 2022 13:41  --------------------------------------------------------  IN: 120 mL / OUT: 150 mL / NET: -30 mL          PHYSICAL EXAM  GENERAL: NAD, cachetic  HEAD:  Atraumatic, normocephalic  EYES: EOMI, PERRLA, conjunctiva and sclera clear  CHEST/LUNG: Clear to auscultation bilaterally; no wheezes  HEART: + LVAD hum  ABDOMEN: Soft, nontender, nondistended; bowel sounds present; +R sided driveline; +R sided cholecystostomy tube with biliary drainage  EXTREMITIES:  2+ peripheral pulses; no clubbing, cyanosis, or edema  PSYCH: AAOx3      LABS:                        9.7    9.02  )-----------( 219      ( 25 Jan 2022 06:15 )             30.6     01-25    131<L>  |  99  |  15  ----------------------------<  79  4.1   |  21<L>  |  0.94    Ca    9.4      25 Jan 2022 06:15  Mg     2.1     01-25                  RADIOLOGY & ADDITIONAL TESTS:    Imaging Personally Reviewed:  Consultant(s) Notes Reviewed:    Care Discussed with Consultants/Other Providers:

## 2022-01-25 NOTE — PROGRESS NOTE ADULT - ASSESSMENT
65M PMH LVAD, recurrent sepsis, pneumonia, intubated/trach x2 cycles, chronic gall bladder disease s/p percutaneous cholecystectomy, SMA ischemia s/p stent, cachexia who presented from [facility] on 12/13 with septic shock 2/2 COVID (+) S/P pressor support now titrated off and downgraded to the floor. Hosp course c/b serratia bacteremia on IV Zosyn through 1/25, aspiration PNA, VT due to hypokalemia, and now CT finding of splenic abscess medically managing.

## 2022-01-25 NOTE — PROGRESS NOTE ADULT - ASSESSMENT
Past psychiatric history: Denies any inpatient psychiatric hospitalizations, denies s/a.     Psychological assessment: Seen resting in bed on 2DSU. Appetite "so-so." Eating some of his meals. Denies current stomach pain. Mood "I'm good." Appeared comfortable resting in bed. Denies issues with sleep. Provided supportive therapy.     Mental status exam: Seen resting in bed. Awake, oriented x3-4. Speaking with soft volume and nodding yes/no to questions. Thought process goal directed. No evidence of any psychosis, kenan, delusions. No SI/HI. Mood sad. Affect constricted. Denies anxiety. Endorsed stomach pain. Insight and judgment adequate.     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 F06. 31 Systolic heart failure I50.2  LVAD Z95.811    Recommendations:   Seen by psychiatry  May benefit from GI consult   Seen by Palliative care   As Creole is patient’s primary language, would benefit from all information being explained using  services     Behavioral Cardiology will follow      38 minutes spent on total patient encounter        Past psychiatric history: Denies any inpatient psychiatric hospitalizations, denies s/a.     Psychological assessment: Seen resting in bed on 2DSU. Appetite "so-so" but reports he is eating some of his meals. Denies current stomach pain. Mood "I'm good." Appeared comfortable resting in bed. Watching TV. Denies issues with sleep. Denies symptoms of depression, anxiety. Denies anhedonia. Provided supportive therapy.     Mental status exam: Seen resting in bed. Awake, oriented x3-4. Speaking with soft volume. Thought process goal directed, content appropriate to conversation. No evidence of any psychosis, kenan, delusions. No SI/HI. Mood neutral. Affect less constricted. Endorsed stomach pain. Insight and judgment adequate.     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 F06. 31 Systolic heart failure I50.2  LVAD Z95.811    Recommendations:   Seen by psychiatry  May benefit from GI consult   Seen by Palliative care   As Creole is patient’s primary language, would benefit from all information being explained using  services     Behavioral Cardiology will follow      20 minutes spent on total patient encounter

## 2022-01-25 NOTE — PROGRESS NOTE ADULT - PROBLEM SELECTOR PLAN 2
CT w/ possible splenic abscess 4 cm in size  IR consulted - risks of draining outweigh benefits especially given bacteremia has resolved  Given multiple medical chronic issues, pt would not be a good candidate  IV abx as per ID  Palliative on board for pain/symptom control - started Marinol as well

## 2022-01-25 NOTE — PROGRESS NOTE ADULT - PROBLEM SELECTOR PLAN 1
Blood Cx (1/11) grew serratia- on IV Zosyn   CT A/P showing splenic abscess 4cm in size - see section below for management

## 2022-01-25 NOTE — PROGRESS NOTE ADULT - PROBLEM SELECTOR PLAN 3
- Continue with Toprol XL to 25mg qAM and 50 mg qPM (hold for MAP less than 70)  - continue spironolactone 25 mg daily  - Daily PT  - his overall prognosis is guarded, palliative care following    - Will need to arrange for family meeting

## 2022-01-25 NOTE — PROGRESS NOTE ADULT - PROBLEM SELECTOR PLAN 2
- hx of Serratia bacteremia, blood cultures 1/14 NGTD  - WBC overall stable   - will remain on IV zosyn and PO Vancomycin while in house, plan to transition to suppressive abx when cleared for discharge  - ID following  - CT C/A/P 1/19 shows hypoenhancing lesion in the spleen measuring 4.0 cm.   - IR consulted, as the patient is not acutely ill at the moment with cleared Bcx, holding off on intervention for now. Risks outweigh benefits.

## 2022-01-25 NOTE — PROGRESS NOTE ADULT - SUBJECTIVE AND OBJECTIVE BOX
Behavioral Cardiology Progress Note     History of present illness: Mr. Quispe is a 66 year-old man with history of NICM s/p HM2 LVAD (9/2017) as DT (due to severe PAD) with TV ring, multiple GI bleeds, thus maintained off AC, CKD 3prior COVID-19 infection in 4/2020, recurrent syncope post LVAD s/p ILR, s/p prolonged complex hospitalization from 6/14-11/16/2021 initially admitted with abdominal pain complicated by GIB, respiratory failure s/p trach, multiple infections, s/p cholecystotomy tube and SMA stent 10/2021, ultimately discharged to [facility] and then returned to [facility] for trach decannulation 12/2 who now presents with recurrent COVID-19 infection, initially in distributive shock. 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 His main issue at this time is his malnutrition for which supplements have been added to his diet.     Social history: , lives in his sister’s house in [place]. Has 3 sons (2 live in , 1 in [facility]). Oldest son (Kang Quispe) lives in [place]. Not close with his children. One of 3 siblings. Lives in his sister’s house in [place] (Cristina Rudolph [phone]), also in the home are niece (Celestina RudolphFormerly [facility] [phone]) and nephew (Miller Rudolph).  Another sister lives close by in [place] and all other siblings live in [place] which the family visit often. Prior to health issues, worked full time at [employer] in [place], stopped working due to heart disease. Education: high school graduate.   Substance use:   ·	Tobacco: Denies. Former smoker, 8-10 cigarettes/day for 30 years, quit prior to LVAD 2017.  ·	Alcohol: Past use of 3-4 drinks of [place] 2x/week. None since health problems began.   ·	Drug: Denies any drug use.

## 2022-01-25 NOTE — PROGRESS NOTE ADULT - PROBLEM SELECTOR PLAN 5
With LVAD  Continue spironolactone 25 mg QD  Continue metoprolol succinate 25 mg am and 50 mg QHS - cannot titrate up given low BP  Continue salt tabs for hyponatremia   Multiple runs of WCT- continue Mexiletine 150 mg q8  Very poor prognosis- palliative following, plan to continue symptom support and GOC  Psych following- continue zoloft and Remeron, added melatonin  Off ASA due to downtrend in hgb

## 2022-01-25 NOTE — PROGRESS NOTE ADULT - SUBJECTIVE AND OBJECTIVE BOX
INFECTIOUS DISEASES FOLLOW UP-- Anitra Prater  [phone]    This is a follow up note for this  66yMale with  Sepsis        ROS:  CONSTITUTIONAL:  No fever, good appetite  CARDIOVASCULAR:  No chest pain or palpitations  RESPIRATORY:  No dyspnea  GASTROINTESTINAL:  No nausea, vomiting, diarrhea, or abdominal pain  GENITOURINARY:  No dysuria  NEUROLOGIC:  No headache,     Allergies    Amiodarone Hydrochloride (Other (Severe))    Intolerances        ANTIBIOTICS/RELEVANT:  antimicrobials  piperacillin/tazobactam IVPB.. 3.375 Gram(s) IV Intermittent every 8 hours  vancomycin    Solution 125 milliGRAM(s) Oral every 12 hours    immunologic:    OTHER:  acetaminophen     Tablet .. 650 milliGRAM(s) Oral every 6 hours PRN  baclofen 5 milliGRAM(s) Oral every 8 hours  chlorhexidine 2% Cloths 1 Application(s) Topical <User Schedule>  cholecalciferol 1000 Unit(s) Oral daily  dextrose 50% Injectable 25 milliLiter(s) IV Push every 15 minutes  dextrose 50% Injectable 50 milliLiter(s) IV Push every 15 minutes  dronabinol 2.5 milliGRAM(s) Oral at bedtime  gabapentin 100 milliGRAM(s) Oral two times a day  HYDROmorphone  Injectable 0.2 milliGRAM(s) IV Push every 3 hours PRN  magnesium oxide 400 milliGRAM(s) Oral three times a day with meals  melatonin 3 milliGRAM(s) Oral at bedtime  methimazole 10 milliGRAM(s) Oral daily  metoclopramide Injectable 10 milliGRAM(s) IV Push three times a day  metoprolol succinate ER 50 milliGRAM(s) Oral at bedtime  metoprolol succinate ER 25 milliGRAM(s) Oral daily  mexiletine 150 milliGRAM(s) Oral every 8 hours  mirtazapine 7.5 milliGRAM(s) Oral at bedtime  multivitamin 1 Tablet(s) Oral daily  pantoprazole  Injectable 40 milliGRAM(s) IV Push two times a day  polyethylene glycol 3350 17 Gram(s) Oral every 12 hours  senna 2 Tablet(s) Oral at bedtime  sertraline 100 milliGRAM(s) Oral daily  sodium chloride 1 Gram(s) Oral every 8 hours  sodium chloride 0.9% lock flush 3 milliLiter(s) IV Push every 8 hours  spironolactone 25 milliGRAM(s) Oral daily      Objective:  Vital Signs Last 24 Hrs  T(C): 36.6 (25 Jan 2022 16:30), Max: 36.7 (25 Jan 2022 00:11)  T(F): 97.8 (25 Jan 2022 16:30), Max: 98.1 (25 Jan 2022 00:11)  HR: 90 (25 Jan 2022 16:30) (74 - 90)  BP: --  BP(mean): 84 (25 Jan 2022 16:30) (72 - 84)  RR: 18 (25 Jan 2022 16:30) (18 - 18)  SpO2: 96% (25 Jan 2022 16:30) (96% - 98%)    PHYSICAL EXAM:  Constitutional:no acute distress, frail  Eyes:ALONSO, EOMI  Ear/Nose/Throat: no oral lesions, 	  Respiratory: clear BL  Cardiovascular: S1S2 VAD sounds  Gastrointestinal: thin   cholecystotomy tube bilious drainage  LVAd site CDI  Extremities:no e/e/c  No Lymphadenopathy  IV sites not inflammed.    LABS:                        9.7    9.02  )-----------( 219      ( 25 Jan 2022 06:15 )             30.6     01-25    131<L>  |  99  |  15  ----------------------------<  79  4.1   |  21<L>  |  0.94    Ca    9.4      25 Jan 2022 06:15  Mg     2.1     01-25            MICROBIOLOGY:      Culture - Blood in AM (01.14.22 @ 10:22)    Specimen Source: .Blood Blood    Culture Results:   No Growth Final          RECENT CULTURES:      RADIOLOGY & ADDITIONAL STUDIES:

## 2022-01-25 NOTE — CHART NOTE - NSCHARTNOTEFT_GEN_A_CORE
Verbal Nutrition Consult from HF team for initiation of 3-day Calorie Count.    Diet, Pureed:   DASH/TLC {Sodium & Cholesterol Restricted} (DASH)  Mildly Thick Liquids (MILDTHICKLIQS) (01-24-22 @ 11:10) [Active]    RD posted 3-day calorie count on pt's door; made RN aware.  Calorie count to be initiated today, 1/25 through 1/27.    RD to remain available and follow-up upon completion of calorie count and make recommendations as needed.  Livier Beaver, MS, RD, CDN, [facility] pgr #[phone]

## 2022-01-25 NOTE — PROGRESS NOTE ADULT - ASSESSMENT
66yo man with a history of VAD palcement, history of COVID infeciton in April 2020, history of a prolonged hospitalization in 2021 with recurrent sepsis, pneumonia, intubated/trach x2 cycles , chronic gall bladder disease s/p perc dawn, SMA ischemia reuiring a stent placement, cachecxia who was discharged to rehab but is presented from [facility] on 12/13 with AMS, hypotensive, tachycardic found to be COVID (+). Pt is lethargic and mumbling words required ICU admission and pressor support but recovered and is off oxygen on general cardiac floor    Recurrent GNR bacteremia/serratia:  stable on Zosyn therapy  likely splenic abscess- patient reluctant to permit drainage      COVID infection-   confirmed second bout of COVID by documented PCR testing  Ideally, would be interested in sequencing the virus to determine strain (omicron vs delta)  Received a dose of monoclonal antibodies Regener[employer] product   Completed remdesivir therapy  Completed ten days of dexamethasone  Refuses most vaccines- but will need to wait three months  to receive COVID vaccine series post monoclonal  Will administer flu/pneumonia vaccines this admission prior to discharge if he will permit      Recurrent serratia bacteremia  blood cultures clear  serratia sensitive to Zosyn continue present therapy  will plan to treat for a prolonged course of therapy x 4weeks    Likely splenic abscess 4cm collection seen on abdominal CT scan:  Likely embolic in nature  IR consult noted - risks of drainage may outweigh benefits  Patient has cleared bacteremia on current antibiotic but may recur once antibiotics are stopped  Patient voicing that he is not interested in drainage of collection      Prior severe C.diff infection   pre-emptive oral Vanco 125mg bid      Morris Prater MD  [phone]  After 5pm/weekends [phone]

## 2022-01-25 NOTE — PROGRESS NOTE ADULT - SUBJECTIVE AND OBJECTIVE BOX
Subjective: Patient seen and examined resting in bed.     Medications:  acetaminophen     Tablet .. 650 milliGRAM(s) Oral every 6 hours PRN  baclofen 5 milliGRAM(s) Oral every 8 hours  chlorhexidine 2% Cloths 1 Application(s) Topical <User Schedule>  cholecalciferol 1000 Unit(s) Oral daily  dextrose 50% Injectable 25 milliLiter(s) IV Push every 15 minutes  dextrose 50% Injectable 50 milliLiter(s) IV Push every 15 minutes  dronabinol 2.5 milliGRAM(s) Oral at bedtime  gabapentin 100 milliGRAM(s) Oral two times a day  HYDROmorphone  Injectable 0.2 milliGRAM(s) IV Push every 3 hours PRN  magnesium oxide 400 milliGRAM(s) Oral three times a day with meals  melatonin 3 milliGRAM(s) Oral at bedtime  methimazole 10 milliGRAM(s) Oral daily  metoclopramide Injectable 10 milliGRAM(s) IV Push three times a day  metoprolol succinate ER 50 milliGRAM(s) Oral at bedtime  metoprolol succinate ER 25 milliGRAM(s) Oral daily  mexiletine 150 milliGRAM(s) Oral every 8 hours  mirtazapine 7.5 milliGRAM(s) Oral at bedtime  multivitamin 1 Tablet(s) Oral daily  pantoprazole  Injectable 40 milliGRAM(s) IV Push two times a day  piperacillin/tazobactam IVPB.. 3.375 Gram(s) IV Intermittent every 8 hours  polyethylene glycol 3350 17 Gram(s) Oral every 12 hours  senna 2 Tablet(s) Oral at bedtime  sertraline 100 milliGRAM(s) Oral daily  sodium chloride 1 Gram(s) Oral every 8 hours  sodium chloride 0.9% lock flush 3 milliLiter(s) IV Push every 8 hours  spironolactone 25 milliGRAM(s) Oral daily  vancomycin    Solution 125 milliGRAM(s) Oral every 12 hours    Vitals:  Vital Signs Last 24 Hours  T(C): 36.6 (22 @ 04:21), Max: 36.7 (22 @ 08:15)  HR: 86 (22 @ 04:21) (79 - 90)  BP: --  RR: 18 (22 @ 04:21) (18 - 18)  SpO2: 98% (22 @ 04:21) (95% - 99%)    Weight in k.4 ( @ 01:39)    I&O's Summary    2022 07:01  -  2022 07:00  --------------------------------------------------------  IN: 600 mL / OUT: 1035 mL / NET: -435 mL        Physical Exam  General: frail, resting in bed   HEENT: EOM intact.  Neck: Neck supple. JVP not elevated. No masses  Chest: Clear to auscultation bilaterally  CV: +VAD hum  Abdomen: Soft, non-distended, tenderness noted in RUQ and epigastric region, +biliary drain   Neurology: Alert and oriented     LVAD Interrogation  VAD TYPE HM 2  Speed 9200  Flow 3.9  Power 4.8  PI 4.6  Assessment of driveline exit site: driveline dressing c/d/i  Programming changes: no changes made    Labs:                        9.7    9.02  )-----------( 219      ( 2022 06:15 )             30.6     01    131<L>  |  99  |  15  ----------------------------<  79  4.1   |  21<L>  |  0.94    Ca    9.4      2022 06:15  Phos  3.5       Mg     2.1                     Lactate Dehydrogenase, Serum: 270 U/L ( @ 06:49)

## 2022-01-25 NOTE — PROGRESS NOTE ADULT - ASSESSMENT
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 s/p HM2 LVAD in 9/2017 at  (due to severe PAD) with TV ring, multiple GI bleeds, thus maintained off AC, CKD 3prior COVID-19 infection in 4/2020, recurrent syncope post LVAD s/p ILR, s/p prolonged complex hospitalization from 6/14-11/16/2021 initially admitted with abdominal pain complicated by GIB, respiratory failure s/p trach, multiple infections, s/p cholecystotomy tube and SMA stent 10/2021, ultimately discharged to [facility] rehab and then returned to [facility] for trach decannulation 12/2 who now presents with recurrent COVID-19 infection, initially in distributive shock. 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     He had elevated inflammatory markers which are now improving. He received monoclonal antibodies and was started on remdesivir and dexamethasone. He's afebrile with BCx from 12/18 and 12/30 NGTD. He had an episode of hypoxia noted 1/1, possibly in the setting of aspiration. He is currently saturating well on RA. He had runs of MMVT in setting of hypokalemia and being off his beta blocker which has since resolved. He currently appears well compensated from HF perspective. His LDH is stable. His LVAD is functioning well without s/s of pump malfunction.     Recently he was noted to have positive blood culture for serratia marcescens. His Hgb has overall remain stable though he is off anticoagulation/ antiplatelets. He now has a decline in his appetite and is noted to have worsening abdominal pain. His prognosis is guarded, palliative care following.

## 2022-01-26 NOTE — PROGRESS NOTE ADULT - PROBLEM SELECTOR PLAN 7
on 1/10 he fell while on his way to the bathroom  - xray of knee was unremarkable  - ct head 1/10 and 1/11 negative.   - calorie count  - will request evaluation for advancing food from pureed

## 2022-01-26 NOTE — GOALS OF CARE CONVERSATION - ADVANCED CARE PLANNING - CONVERSATION DETAILS
Had a lengthy d/w the patient in regards to his refusal of medications and food. He indicated he was upset because he did not have anyone and that no one "care" about him. He was upset with his sister,  indicating he has been in the hospital for several days; however, no family member has visited him. We tried to explain to him that due to COVID-19, hospital visitation was cancelled and that only until recently visitation was partially re-stated. However, the patient refused to accept that as an answer and continue to state that his family did not care about him. He stated that nobody care if he were to die in the hospital. His sister indicate that beyond the changes in hospital visitation that she also had a knee replacement which has limited her functionality and capacity to come into the hospital. We indicated that his refusal of medication and food as well as the thing he was saying, "staying in the hospital to die," were giving as an idea about him trying to end his life. I then stated that if that were to be his intention that then discussing about deactivating the LVAD was an options. He immediately let us know that his idea was not to turn off his LVAD. We then indicated that if his desire was to try to improve that he then needed to eat and to take his medications. After further discussion about, his illness trajectory and all the things he had overcome to prolong his life, the patient agreed on trying to eat, take his medications, and participate on his care.     Through out the discussion, it was evident, his mood was influencing his decisions and perhaps, to some degree,  putting his capacity at risk. Therefore, if his refusal of medication and food worsens, having psych for capacity, at least for medication, may be of a benefit. Psychology eval is also advised.     Yesterday, after d/w his RN, it was noticed, the patient was on dysphagia diet and that he was asking for a "turkey sandwich." However, most recent MBS was recommending regular diet. A S&S re-eval was placed and if not contraindication, re-stating regular diet is recommended.    Emotional support and active listening was provided.     I spent a total time of 60  mins with the patient at bedside of which more than 50% of time was spent on counseling/coordination of care. Had a lengthy d/w the patient in regards to his refusal of medications and food. He indicated he was upset because he did not have anyone and that no one "care" about him. He was upset with his sister,  indicating he has been in the hospital for several days; however, no family member has visited him. We tried to explain to him that due to COVID-19, hospital visitation was cancelled and that only until recently visitation was partially re-stated. However, the patient refused to accept that as an answer and continue to state that his family did not care about him. He stated that nobody care if he were to die in the hospital. His sister indicate that beyond the changes in hospital visitation that she also had a knee replacement which has limited her functionality and capacity to come into the hospital. We indicated that his refusal of medication and food as well as the thing he was saying, "staying in the hospital to die," were giving as an idea about him trying to end his life. I then stated that if that were to be his intention that then discussing about deactivating the LVAD was an options. He immediately let us know that his idea was not to turn off his LVAD. We then indicated that if his desire was to try to improve that he then needed to eat and to take his medications. After further discussion about, his illness trajectory and all the things he had overcome to prolong his life, the patient agreed on trying to eat, take his medications, and participate on his care.     Through out the discussion, it was evident, his mood was influencing his decisions and perhaps, to some degree,  putting his capacity at risk. Therefore, if his refusal of medication and food worsens, having psych for capacity, at least for medication, may be of a benefit. Psychology eval is also advised.     Yesterday, after d/w his RN, it was noticed, the patient was on dysphagia diet and that he was asking for a "turkey sandwich." However, most recent MBS was recommending regular diet. A S&S re-eval was placed and if not contraindication, re-stating regular diet is recommended.    Will increase Marinol to 2.5mg 1 hour before lunch dineer     Emotional support and active listening was provided.     I spent a total time of 60  mins with the patient at bedside of which more than 50% of time was spent on counseling/coordination of care.

## 2022-01-26 NOTE — PROGRESS NOTE ADULT - ASSESSMENT
65M PMH LVAD, recurrent sepsis, pneumonia, intubated/trach x2 cycles, chronic gall bladder disease s/p percutaneous cholecystectomy, SMA ischemia s/p stent, cachexia who presented from [facility] on 12/13 with septic shock 2/2 COVID (+) S/P pressor support now titrated off and downgraded to the floor. Hosp course c/b serratia bacteremia on IV Zosyn through 1/25, aspiration PNA, VT due to hypokalemia, and now CT finding of splenic abscess medically managing.

## 2022-01-26 NOTE — PHARMACOTHERAPY INTERVENTION NOTE - COMMENTS
Patient is currently receiving piperacillin/tazobactam for Serratia bacteremia, set to fall off tomorrow evening after a 2 week course, however ID is recommending extending therapy to 4 weeks. Recommended reordering antibiotic with new extended duration to avoid potential early discontinuation with current order.    Yohana Manzanares, PharmD, [facility]  Clinical Pharmacy Specialist  [phone] or Teams

## 2022-01-26 NOTE — PROGRESS NOTE ADULT - PROBLEM SELECTOR PLAN 2
CT w/ possible splenic abscess 4 cm in size  IR consulted - risks of draining outweigh benefits especially given bacteremia has resolved  Given multiple medical chronic issues, pt would not be a good candidate  IV abx as per ID- 4 weeks Zosyn  Palliative on board for pain/symptom control - started Marinol as well

## 2022-01-26 NOTE — PROGRESS NOTE ADULT - PROBLEM SELECTOR PLAN 1
s/p controller change on 12/16   -  on 1/15; repeat LDH   - Off aspirin due to drop in Hgb. will resume when feasible   - Off coumadin due to persistent recurrent GI bleeding.

## 2022-01-26 NOTE — PROGRESS NOTE ADULT - ASSESSMENT
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 s/p HM2 LVAD in 9/2017 at  (due to severe PAD) with TV ring, multiple GI bleeds, thus maintained off AC, CKD 3prior COVID-19 infection in 4/2020, recurrent syncope post LVAD s/p ILR, s/p prolonged complex hospitalization from 6/14-11/16/2021 initially admitted with abdominal pain complicated by GIB, respiratory failure s/p trach, multiple infections, s/p cholecystotomy tube and SMA stent 10/2021, ultimately discharged to [facility] rehab and then returned to [facility] for trach decannulation 12/2 who now presents with recurrent COVID-19 infection, initially in distributive shock. 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     He had elevated inflammatory markers which are now improving. He received monoclonal antibodies and was started on remdesivir and dexamethasone. He's afebrile with BCx from 12/18 and 12/30 NGTD. He had an episode of hypoxia noted 1/1, possibly in the setting of aspiration. He is currently saturating well on RA. He had runs of MMVT in setting of hypokalemia and being off his beta blocker which has since resolved. He currently appears well compensated from HF perspective. His LDH is stable. His LVAD is functioning well without s/s of pump malfunction.     Recently he was noted to have positive blood culture for serratia marcescens. His Hgb has overall remain stable though he is off anticoagulation/ antiplatelets. He now has a decline in his appetite and is noted to have worsening abdominal pain. His prognosis is guarded, palliative care was consulted. Family meeting held 1/26 to emphasize importance of nutrition, physical therapy but appeared somewhat depressed.

## 2022-01-26 NOTE — PROGRESS NOTE ADULT - PROBLEM SELECTOR PLAN 2
hx of Serratia bacteremia, blood cultures 1/14 NGTD  WBC downtrending   - currently on IV zosyn; total 4 weeks of abx  - ID following  - continue with Vanco PO for C. Diff ppx (Pt refusing)  - CT C/A/P 1/19 shows hypoenhancing lesion in the spleen measuring 4.0 cm.   - IR consulted, as the patient is not acutely ill at the moment with cleared Bcx, holding off on intervention for now. Risks outweigh benefits.

## 2022-01-26 NOTE — PROGRESS NOTE ADULT - SUBJECTIVE AND OBJECTIVE BOX
Interval History:  family meeting held today but patient was somewhat reluctant to pursue measures to get better and move forward with family; appears depressed  denies complaints     Medications:  acetaminophen     Tablet .. 650 milliGRAM(s) Oral every 6 hours PRN  baclofen 5 milliGRAM(s) Oral every 8 hours  chlorhexidine 2% Cloths 1 Application(s) Topical <User Schedule>  cholecalciferol 1000 Unit(s) Oral daily  dextrose 50% Injectable 25 milliLiter(s) IV Push every 15 minutes  dextrose 50% Injectable 50 milliLiter(s) IV Push every 15 minutes  dronabinol 2.5 milliGRAM(s) Oral two times a day before meals  gabapentin 100 milliGRAM(s) Oral two times a day  HYDROmorphone  Injectable 0.2 milliGRAM(s) IV Push every 3 hours PRN  magnesium oxide 400 milliGRAM(s) Oral three times a day with meals  melatonin 3 milliGRAM(s) Oral at bedtime  methimazole 10 milliGRAM(s) Oral daily  metoclopramide Injectable 10 milliGRAM(s) IV Push three times a day  metoprolol succinate ER 50 milliGRAM(s) Oral at bedtime  metoprolol succinate ER 25 milliGRAM(s) Oral daily  mexiletine 150 milliGRAM(s) Oral every 8 hours  mirtazapine 7.5 milliGRAM(s) Oral at bedtime  multivitamin 1 Tablet(s) Oral daily  pantoprazole  Injectable 40 milliGRAM(s) IV Push two times a day  piperacillin/tazobactam IVPB.. 3.375 Gram(s) IV Intermittent every 8 hours  polyethylene glycol 3350 17 Gram(s) Oral every 12 hours  senna 2 Tablet(s) Oral at bedtime  sertraline 100 milliGRAM(s) Oral daily  sodium chloride 1 Gram(s) Oral every 8 hours  sodium chloride 0.9% lock flush 3 milliLiter(s) IV Push every 8 hours  spironolactone 25 milliGRAM(s) Oral daily  vancomycin    Solution 125 milliGRAM(s) Oral every 12 hours      Vitals:  T(C): 36.9 (22 @ 18:24), Max: 36.9 (22 @ 18:24)  HR: 90 (22 @ 18:24) (78 - 93)  BP: 101/67 (22 @ 04:50) (101/67 - 101/)  BP(mean): 78 (22 @ 18:24) (70 - 78)  ABP: --  ABP(mean): --  RR: 16 (22 @ 18:24) (16 - 18)  SpO2: 99% (22 @ 18:24) (95% - 100%)    Daily     Daily Weight in k.3 (2022 08:01)        I&O's Summary    2022 07:  -  2022 07:00  --------------------------------------------------------  IN: 240 mL / OUT: 1425 mL / NET: -1185 mL    2022 07:01  -  2022 20:40  --------------------------------------------------------  IN: 720 mL / OUT: 580 mL / NET: 140 mL        Physical Exam:  Appearance: No Acute Distress; cachectic   HEENT: PERRL  Neck: No JVD  Cardiovascular: Normal S1 S2, No murmurs/rubs/gallops; VAD hum present   Respiratory: Clear to auscultation bilaterally  Gastrointestinal: Soft, Non-tender; perc drain 	  Skin: No cyanosis	  Neurologic: Non-focal  Extremities: No LE edema  Psychiatry: A & O x 3, Mood & affect appropriate    LVAD Interrogation:  Pump Speed: 9200  Pump Flow: 3.9  Pulse Index: 4.2  Pump Power: 5  VAD Events: no events  Driveline evaluation: c/d/i  Programming Changes: No changes made    Labs:                        9.2    8.81  )-----------( 200      ( 2022 06:36 )             28.0         130<L>  |  96  |  12  ----------------------------<  71  4.1   |  22  |  0.87    Ca    9.7      2022 06:36  Mg     2.1         TPro  9.0<H>  /  Alb  3.2<L>  /  TBili  0.3  /  DBili  x   /  AST  21  /  ALT  12  /  AlkPhos  178<H>

## 2022-01-26 NOTE — PROGRESS NOTE ADULT - PROBLEM SELECTOR PLAN 3
Continue with Toprol XL to 25mg qAM and 50 mg qPM (hold for MAP less than 70)  - continue spironolactone 25 mg daily  - Daily PT  - his overall prognosis is guarded, palliative care was reconsulted

## 2022-01-26 NOTE — PROGRESS NOTE ADULT - SUBJECTIVE AND OBJECTIVE BOX
Mich Krueger MD  Pager [phone]  Spectra [phone]  Cell Phone [phone]    Patient is a 66y old  Male who presents with a chief complaint of COVID (+) with AMS and hypotension (25 Jan 2022 23:51)        SUBJECTIVE / OVERNIGHT EVENTS: Patient c/o LUQ abd discomfort this morning  TELEMETRY: SR 80-90's      MEDICATIONS  (STANDING):  baclofen 5 milliGRAM(s) Oral every 8 hours  chlorhexidine 2% Cloths 1 Application(s) Topical <User Schedule>  cholecalciferol 1000 Unit(s) Oral daily  dextrose 50% Injectable 25 milliLiter(s) IV Push every 15 minutes  dextrose 50% Injectable 50 milliLiter(s) IV Push every 15 minutes  dronabinol 2.5 milliGRAM(s) Oral at bedtime  gabapentin 100 milliGRAM(s) Oral two times a day  magnesium oxide 400 milliGRAM(s) Oral three times a day with meals  melatonin 3 milliGRAM(s) Oral at bedtime  methimazole 10 milliGRAM(s) Oral daily  metoclopramide Injectable 10 milliGRAM(s) IV Push three times a day  metoprolol succinate ER 50 milliGRAM(s) Oral at bedtime  metoprolol succinate ER 25 milliGRAM(s) Oral daily  mexiletine 150 milliGRAM(s) Oral every 8 hours  mirtazapine 7.5 milliGRAM(s) Oral at bedtime  multivitamin 1 Tablet(s) Oral daily  pantoprazole  Injectable 40 milliGRAM(s) IV Push two times a day  piperacillin/tazobactam IVPB.. 3.375 Gram(s) IV Intermittent every 8 hours  polyethylene glycol 3350 17 Gram(s) Oral every 12 hours  senna 2 Tablet(s) Oral at bedtime  sertraline 100 milliGRAM(s) Oral daily  sodium chloride 1 Gram(s) Oral every 8 hours  sodium chloride 0.9% lock flush 3 milliLiter(s) IV Push every 8 hours  spironolactone 25 milliGRAM(s) Oral daily  vancomycin    Solution 125 milliGRAM(s) Oral every 12 hours    MEDICATIONS  (PRN):  acetaminophen     Tablet .. 650 milliGRAM(s) Oral every 6 hours PRN Temp greater or equal to 38C (100.4F), Mild Pain (1 - 3), Moderate Pain (4 - 6)  HYDROmorphone  Injectable 0.2 milliGRAM(s) IV Push every 3 hours PRN Moderate to severe pain      Vital Signs Last 24 Hrs  T(C): 36.6 (26 Jan 2022 10:00), Max: 36.6 (25 Jan 2022 16:30)  T(F): 97.8 (26 Jan 2022 10:00), Max: 97.8 (25 Jan 2022 16:30)  HR: 88 (26 Jan 2022 10:00) (74 - 93)  BP: 101/67 (26 Jan 2022 04:50) (101/67 - 101/67)  BP(mean): 70 (26 Jan 2022 10:00) (70 - 84)  RR: 18 (26 Jan 2022 10:00) (18 - 18)  SpO2: 95% (26 Jan 2022 10:00) (95% - 98%)  CAPILLARY BLOOD GLUCOSE        I&O's Summary    25 Jan 2022 07:01  -  26 Jan 2022 07:00  --------------------------------------------------------  IN: 240 mL / OUT: 1425 mL / NET: -1185 mL          PHYSICAL EXAM  GENERAL: NAD, cachetic  HEAD:  Atraumatic, normocephalic  EYES: EOMI, PERRLA, conjunctiva and sclera clear  CHEST/LUNG: Clear to auscultation bilaterally; no wheezes  HEART: + LVAD hum  ABDOMEN: Soft, tender to palpation in LUQ, nondistended; bowel sounds present; +R sided driveline; +R sided cholecystostomy tube with biliary drainage  EXTREMITIES:  2+ peripheral pulses; no clubbing, cyanosis, or edema  PSYCH: AAOx3    LABS:                        9.2    8.81  )-----------( 200      ( 26 Jan 2022 06:36 )             28.0     01-26    130<L>  |  96  |  12  ----------------------------<  71  4.1   |  22  |  0.87    Ca    9.7      26 Jan 2022 06:36  Mg     2.1     01-25    TPro  9.0<H>  /  Alb  3.2<L>  /  TBili  0.3  /  DBili  x   /  AST  21  /  ALT  12  /  AlkPhos  178<H>  01-26                RADIOLOGY & ADDITIONAL TESTS:    Imaging Personally Reviewed:  Consultant(s) Notes Reviewed:    Care Discussed with Consultants/Other Providers:

## 2022-01-26 NOTE — PROGRESS NOTE ADULT - PROBLEM SELECTOR PLAN 6
chronic and typically hypovolemic due to poor PO intake  - overall stable.  - on sodium chloride 1 gram q8h

## 2022-01-27 NOTE — PROGRESS NOTE ADULT - ASSESSMENT
65M PMH LVAD, recurrent sepsis, pneumonia, intubated/trach x2 cycles, chronic gall bladder disease s/p percutaneous cholecystectomy, SMA ischemia s/p stent, cachexia who presented from [facility] on 12/13 with septic shock 2/2 COVID (+) S/P pressor support now titrated off and downgraded to the floor. Hosp course c/b serratia bacteremia on IV Zosyn through 1/25, aspiration PNA, VT due to hypokalemia, and now CT finding of splenic abscess medically managing.

## 2022-01-27 NOTE — PROGRESS NOTE ADULT - SUBJECTIVE AND OBJECTIVE BOX
Mich Krueger MD  Pager [phone]  Spectra [phone]  Cell Phone [phone]    Patient is a 66y old  Male who presents with a chief complaint of COVID (+) with AMS and hypotension (27 Jan 2022 10:47)        SUBJECTIVE / OVERNIGHT EVENTS: No new events  TELEMETRY: SR 80-90's      MEDICATIONS  (STANDING):  baclofen 5 milliGRAM(s) Oral every 8 hours  chlorhexidine 2% Cloths 1 Application(s) Topical <User Schedule>  cholecalciferol 1000 Unit(s) Oral daily  dextrose 50% Injectable 25 milliLiter(s) IV Push every 15 minutes  dextrose 50% Injectable 50 milliLiter(s) IV Push every 15 minutes  dronabinol 2.5 milliGRAM(s) Oral two times a day before meals  gabapentin 100 milliGRAM(s) Oral two times a day  magnesium oxide 400 milliGRAM(s) Oral three times a day with meals  melatonin 3 milliGRAM(s) Oral at bedtime  methimazole 10 milliGRAM(s) Oral daily  metoclopramide Injectable 10 milliGRAM(s) IV Push three times a day  metoprolol succinate ER 50 milliGRAM(s) Oral at bedtime  metoprolol succinate ER 25 milliGRAM(s) Oral daily  mexiletine 150 milliGRAM(s) Oral every 8 hours  mirtazapine 7.5 milliGRAM(s) Oral at bedtime  multivitamin 1 Tablet(s) Oral daily  pantoprazole  Injectable 40 milliGRAM(s) IV Push two times a day  piperacillin/tazobactam IVPB.. 3.375 Gram(s) IV Intermittent every 8 hours  polyethylene glycol 3350 17 Gram(s) Oral every 12 hours  senna 2 Tablet(s) Oral at bedtime  sertraline 100 milliGRAM(s) Oral daily  sodium chloride 1 Gram(s) Oral every 8 hours  sodium chloride 0.9% lock flush 3 milliLiter(s) IV Push every 8 hours  spironolactone 25 milliGRAM(s) Oral daily  vancomycin    Solution 125 milliGRAM(s) Oral every 12 hours    MEDICATIONS  (PRN):  acetaminophen     Tablet .. 650 milliGRAM(s) Oral every 6 hours PRN Temp greater or equal to 38C (100.4F), Mild Pain (1 - 3), Moderate Pain (4 - 6)  HYDROmorphone  Injectable 0.2 milliGRAM(s) IV Push every 3 hours PRN Moderate to severe pain      Vital Signs Last 24 Hrs  T(C): 36.6 (27 Jan 2022 05:50), Max: 36.9 (26 Jan 2022 18:24)  T(F): 97.8 (27 Jan 2022 05:50), Max: 98.4 (26 Jan 2022 18:24)  HR: 80 (27 Jan 2022 05:50) (80 - 90)  BP: --  BP(mean): 78 (27 Jan 2022 09:00) (74 - 82)  RR: 18 (27 Jan 2022 05:50) (12 - 18)  SpO2: 96% (27 Jan 2022 08:48) (96% - 100%)  CAPILLARY BLOOD GLUCOSE        I&O's Summary    26 Jan 2022 07:01  -  27 Jan 2022 07:00  --------------------------------------------------------  IN: 960 mL / OUT: 780 mL / NET: 180 mL    27 Jan 2022 07:01  -  27 Jan 2022 14:44  --------------------------------------------------------  IN: 480 mL / OUT: 375 mL / NET: 105 mL          PHYSICAL EXAM  GENERAL: NAD, cachetic  HEAD:  Atraumatic, normocephalic  EYES: EOMI, PERRLA, conjunctiva and sclera clear  CHEST/LUNG: Clear to auscultation bilaterally; no wheezes  HEART: + LVAD hum  ABDOMEN: Soft, tender to palpation in LUQ, nondistended; bowel sounds present; +R sided driveline; +R sided cholecystostomy tube with biliary drainage  EXTREMITIES:  2+ peripheral pulses; no clubbing, cyanosis, or edema  PSYCH: AAOx3      LABS:                        9.5    8.55  )-----------( 236      ( 27 Jan 2022 05:47 )             29.9     01-27    130<L>  |  97  |  10  ----------------------------<  83  5.0   |  20<L>  |  0.88    Ca    10.0      27 Jan 2022 05:47  Phos  3.5     01-27  Mg     1.8     01-27    TPro  9.0<H>  /  Alb  3.2<L>  /  TBili  0.3  /  DBili  x   /  AST  21  /  ALT  12  /  AlkPhos  178<H>  01-26                RADIOLOGY & ADDITIONAL TESTS:    Imaging Personally Reviewed:  Consultant(s) Notes Reviewed:    Care Discussed with Consultants/Other Providers:

## 2022-01-27 NOTE — PROGRESS NOTE ADULT - PROBLEM SELECTOR PLAN 3
- Continue with Toprol XL to 25mg qAM and 50 mg qPM (hold for MAP less than 70)  - continue spironolactone 25 mg daily  - Daily PT  - his overall prognosis is guarded, palliative care was reconsulted

## 2022-01-27 NOTE — PROGRESS NOTE ADULT - PROBLEM SELECTOR PLAN 6
- chronic and typically hypovolemic due to poor PO intake  - overall stable.  - on sodium chloride 1 gram q8h

## 2022-01-27 NOTE — PROGRESS NOTE ADULT - ASSESSMENT
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 s/p HM2 LVAD in 9/2017 at  (due to severe PAD) with TV ring, multiple GI bleeds, thus maintained off AC, CKD 3prior COVID-19 infection in 4/2020, recurrent syncope post LVAD s/p ILR, s/p prolonged complex hospitalization from 6/14-11/16/2021 initially admitted with abdominal pain complicated by GIB, respiratory failure s/p trach, multiple infections, s/p cholecystotomy tube and SMA stent 10/2021, ultimately discharged to [facility] rehab and then returned to [facility] for trach decannulation 12/2 who now presents with recurrent COVID-19 infection, initially in distributive shock. 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     He had elevated inflammatory markers which are now improving. He received monoclonal antibodies and was started on remdesivir and dexamethasone. He's afebrile with BCx from 12/18 and 12/30 NGTD. He had an episode of hypoxia noted 1/1, possibly in the setting of aspiration. He is currently saturating well on RA. He had runs of MMVT in setting of hypokalemia and being off his beta blocker which has since resolved. He currently appears well compensated from HF perspective. His LDH is stable. His LVAD is functioning well without s/s of pump malfunction.     Recently he was noted to have positive blood culture for serratia marcescens. His Hgb has overall remain stable though he is off anticoagulation/ antiplatelets. He now has a decline in his appetite and is noted to have worsening abdominal pain. His prognosis is guarded, palliative care was consulted. Family meeting held 1/26 to emphasize importance of nutrition and physical therapy.

## 2022-01-27 NOTE — CHART NOTE - NSCHARTNOTEFT_GEN_A_CORE
Nutrition Follow Up Note  Patient seen for: Nutrition consult warranted for Assessment/Education    Chart reviewed, events noted. Per chart: 66M, PMH of Stage D NICM s/p HM2 LVAD (2017), multiple GI bleeds (thus maintained off AC), CKD 3, prior COVID-19 infection (2020), s/p prolonged complex hospitalization from -2021 initially admitted with abdominal pain c/b GIB, respiratory failure s/p trach, multiple infections, s/p cholecystotomy tube and SMA stent 10/2021, ultimately discharged to [facility] rehab and then returned to [facility] for trach decannulation  who now presents with recurrent COVID-19 infection in shock. 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  He had an episode of hypoxia noted , possibly in the setting of aspiration. Now CT finding of splenic abscess medically managing.     Source: EMR, Team, Pt    Diet, Regular:   Supplement Feeding Modality:  Oral  Ensure Enlive Cans or Servings Per Day:  1       Frequency:  Three Times a day (22 @ 11:38)      Is current diet order appropriate/adequate? [x] Yes  []  No: however, pt has been with prolonged suboptimal PO intake, consuming <50% at meals.   Have discussed pt's dietary options multiple times in efforts to optimize his overall nutritional intake. Upon last visit this RD had with pt on , discussion with pt was had on improved intake upon prior admission when he was on pureed diet - pt recalled instance and noted he would be amenable to trying pureed diet again as it doesn't require as much energy to chew/swallow pureed food. However, per team, pt was requesting turkey sandwich yesterday so his diet was changed back to Regular textured diet. Calorie count remains ongoing at this time     Calorie Count results thus far:     () 740kcals & 33g protein     () 310kcals & 15g protein  Average 2 Day PO intake: 525kcals & 24g protein - Meets 31% low end energy needs & 29% low end protein needs     Nutrition-Related Events:  - Marinol added  in efforts to increase appetite; Remeron ordered since admit 12/13  - Prolonged hyponatremia - NaCl tabs ordered daily   - Multivitamin, MagOx, and Vitamin D3 supplementation ordered daily  - MBS 1/3 with SLP recommendations for "Regular diet and thin liquids"  - Swallow eval  with SLP recommendations for "Regular solids and thin liquids"  - Upon prior hospitalization was with EN for prolonged period and then advanced to PO diet but was with some dysphagia.    GI:  Last documented BM .   Bowel Regimen? [x] Yes (miralax, senna)  [] No  - Noted on abx course at this time  - Reglan ordered since admit  for gut motility  - Sybil drain remains in place since PTA    Daily Weights in K.5 (), 52.3 (), 52.3 (), 53.2 (), 52.3 (), 54.1 (), 53.4 (), 52.4 (), 52.8 (), 52.9 (), 52.8 (01-15), 66.5 ( ? accuracy), 51.1 (), 50.1 (), 54.6 (), 55.7 (12-15)  - Noted weight has downtrended since admit; likely multifactorial as pt has been receiving aldactone as well as with suboptimal PO intake at times. Will continue to monitor/trend weight status     Drug Dosing Weight  Weight (kg): 55.7 ()  BMI (kg/m2): 16.7 ()  IBW: 178 Ibs/80.7 kg    MEDICATIONS  (STANDING):  cholecalciferol  dextrose 50% Injectable  dextrose 50% Injectable  magnesium oxide  methimazole  metoprolol succinate ER  metoprolol succinate ER  mexiletine  multivitamin  pantoprazole  Injectable  piperacillin/tazobactam IVPB..  polyethylene glycol 3350  senna  sodium chloride  sodium chloride 0.9% lock flush  spironolactone  vancomycin    Solution    Pertinent Labs:  @ 05:47: Na 130<L>, BUN 10, Cr 0.88, BG 83, K+ 5.0, Phos 3.5, Mg 1.8, Alk Phos --, ALT/SGPT --, AST/SGOT --, HbA1c --    A1C with Estimated Average Glucose Result: 5.4 % (08-15- @ 13:52)    Finger Sticks:    Skin per nursing documentation: no pressure injuries noted  Edema: none    Estimated Nutritional Needs  Based on 12/15 weight: 122.7 lb/55.6 kg  Energy Needs (30-35 kcals/kg): 4099-8501  Protein Needs (1.5-1.8 g/kg): 83.4-100.08    Previous Nutrition Diagnosis: Severe-chronic Malnutrition & Underweight  Nutrition Diagnosis is: [x] ongoing - addressed with PO diet, oral nutrition and micronutrient supplementation    New Nutrition Diagnosis: [x] Not applicable    Nutrition Care Plan:  [x] In Progress  [] Achieved  [] Not applicable    Recommendations:      1. Continue diet as ordered. RD to follow-up to complete calorie count tomorrow, however as for 2 day average intake, pt has met <50% estimated nutritional needs daily  - Pending need for EN (if within GOC) would recommend initiating Jevity 1.2 @10ml/hr x 24hrs; RD to follow-up and reassess pending EN initiation as pt at high risk for developing refeeding syndrome  2. Continue micronutrient supplementation as ordered; recommend thiamin supplementation in setting of prolonged suboptimal PO intake - Pt at risk for refeeding syndrome.  3. Continue with appetite stimulants at discretion of team  4. Continue to provide assistance as necessary with encouragement of PO intake at meals/snacks    Monitoring and Evaluation:   Continue to monitor nutritional intake, tolerance to diet prescription, weights, labs, skin integrity  RD remains available upon request and will follow up per protocol    Wendy Travis, MS, RD, CDN, [facility]  pager #[phone]

## 2022-01-27 NOTE — PROGRESS NOTE ADULT - PROBLEM SELECTOR PLAN 1
- s/p controller change on 12/16   -  on 1/15; repeat LDH   - Off aspirin due to drop in Hgb. will resume when feasible   - Off coumadin due to persistent recurrent GI bleeding.

## 2022-01-27 NOTE — PROGRESS NOTE ADULT - SUBJECTIVE AND OBJECTIVE BOX
Subjective: Patient seen and examined resting in bed.     Medications:  acetaminophen     Tablet .. 650 milliGRAM(s) Oral every 6 hours PRN  baclofen 5 milliGRAM(s) Oral every 8 hours  chlorhexidine 2% Cloths 1 Application(s) Topical <User Schedule>  cholecalciferol 1000 Unit(s) Oral daily  dextrose 50% Injectable 25 milliLiter(s) IV Push every 15 minutes  dextrose 50% Injectable 50 milliLiter(s) IV Push every 15 minutes  dronabinol 2.5 milliGRAM(s) Oral two times a day before meals  gabapentin 100 milliGRAM(s) Oral two times a day  HYDROmorphone  Injectable 0.2 milliGRAM(s) IV Push every 3 hours PRN  magnesium oxide 400 milliGRAM(s) Oral three times a day with meals  melatonin 3 milliGRAM(s) Oral at bedtime  methimazole 10 milliGRAM(s) Oral daily  metoclopramide Injectable 10 milliGRAM(s) IV Push three times a day  metoprolol succinate ER 50 milliGRAM(s) Oral at bedtime  metoprolol succinate ER 25 milliGRAM(s) Oral daily  mexiletine 150 milliGRAM(s) Oral every 8 hours  mirtazapine 7.5 milliGRAM(s) Oral at bedtime  multivitamin 1 Tablet(s) Oral daily  pantoprazole  Injectable 40 milliGRAM(s) IV Push two times a day  piperacillin/tazobactam IVPB.. 3.375 Gram(s) IV Intermittent every 8 hours  polyethylene glycol 3350 17 Gram(s) Oral every 12 hours  senna 2 Tablet(s) Oral at bedtime  sertraline 100 milliGRAM(s) Oral daily  sodium chloride 1 Gram(s) Oral every 8 hours  sodium chloride 0.9% lock flush 3 milliLiter(s) IV Push every 8 hours  spironolactone 25 milliGRAM(s) Oral daily  vancomycin    Solution 125 milliGRAM(s) Oral every 12 hours      Vitals:  Vital Signs Last 24 Hours  T(C): 36.6 (22 @ 05:50), Max: 36.9 (22 @ 18:24)  HR: 80 (22 @ 05:50) (80 - 90)  BP: --  RR: 18 (22 @ 05:50) (12 - 18)  SpO2: 96% (22 @ 08:48) (96% - 100%)    Weight in k.5 ( @ 07:12)    I&O's Summary    2022 07:  -  2022 07:00  --------------------------------------------------------  IN: 960 mL / OUT: 780 mL / NET: 180 mL    2022 07:01  -  2022 10:47  --------------------------------------------------------  IN: 0 mL / OUT: 75 mL / NET: -75 mL        Physical Exam  General: frail, resting in bed  HEENT: EOM intact.  Neck: Neck supple. JVP not elevated. No masses  Chest: Clear to auscultation bilaterally  CV: +VAD hum  Abdomen: Soft, non-distended, tender in RUQ and epigastric region  Neurology: Alert and oriented     LVAD Interrogation  VAD TYPE HM 2  Speed 9200  Flow 3.8  Power 5  PI  5.3  Assessment of driveline exit site: driveline dressing c/d/i  Programming changes: no changes made    Labs:                        9.5    8.55  )-----------( 236      ( 2022 05:47 )             29.9         130<L>  |  97  |  10  ----------------------------<  83  5.0   |  20<L>  |  0.88    Ca    10.0      2022 05:47  Phos  3.5       Mg     1.8         TPro  9.0<H>  /  Alb  3.2<L>  /  TBili  0.3  /  DBili  x   /  AST  21  /  ALT  12  /  AlkPhos  178<H>                Lactate Dehydrogenase, Serum: 249 U/L ( @ 06:36)

## 2022-01-27 NOTE — PROGRESS NOTE ADULT - PROBLEM SELECTOR PLAN 1
Blood Cx (1/11) grew serratia- on IV Zosyn- continue for 4 weeks  CT A/P showing splenic abscess 4cm in size - see section below for management

## 2022-01-27 NOTE — PROGRESS NOTE ADULT - PROBLEM SELECTOR PLAN 7
- continues to have poor appetite and abdominal pain   - nutrition following   - undergoing calorie count, ends 1/28

## 2022-01-27 NOTE — PROGRESS NOTE ADULT - PROBLEM SELECTOR PLAN 2
CT w/ possible splenic abscess 4 cm in size  IR consulted - risks of draining outweigh benefits especially given bacteremia has resolved  Given multiple medical chronic issues, pt would not be a good candidate  IV abx as per ID- 4 weeks Zosyn  Palliative on board for pain/symptom control

## 2022-01-27 NOTE — PROGRESS NOTE ADULT - PROBLEM SELECTOR PLAN 2
- hx of Serratia bacteremia, blood cultures 1/14 NGTD  - currently on IV zosyn and Vanco PO for C. Diff ppx   - ID following  - CT C/A/P 1/19 shows hypoenhancing lesion in the spleen measuring 4.0 cm.   - IR consulted, as the patient is not acutely ill at the moment with cleared Bcx, holding off on intervention for now. Risks outweigh benefits.

## 2022-01-28 NOTE — PROGRESS NOTE ADULT - SUBJECTIVE AND OBJECTIVE BOX
INFECTIOUS DISEASES FOLLOW UP-- Anitra Prater  [phone]    This is a follow up note for this  66yMale with  Sepsis        ROS:  CONSTITUTIONAL:  No fever,   CARDIOVASCULAR:  No chest pain or palpitations  RESPIRATORY:  No dyspnea  GASTROINTESTINAL:  No nausea, vomiting, diarrhea, notes chronic abdominal pain and hiccups  GENITOURINARY:  No dysuria  NEUROLOGIC:  No headache,     Allergies    Amiodarone Hydrochloride (Other (Severe))    Intolerances        ANTIBIOTICS/RELEVANT:  antimicrobials  piperacillin/tazobactam IVPB.. 3.375 Gram(s) IV Intermittent every 8 hours  vancomycin    Solution 125 milliGRAM(s) Oral every 12 hours    immunologic:    OTHER:  acetaminophen     Tablet .. 650 milliGRAM(s) Oral every 6 hours PRN  baclofen 5 milliGRAM(s) Oral every 8 hours  chlorhexidine 2% Cloths 1 Application(s) Topical <User Schedule>  cholecalciferol 1000 Unit(s) Oral daily  dextrose 50% Injectable 25 milliLiter(s) IV Push every 15 minutes  dextrose 50% Injectable 50 milliLiter(s) IV Push every 15 minutes  dronabinol 2.5 milliGRAM(s) Oral two times a day before meals  gabapentin 100 milliGRAM(s) Oral two times a day  magnesium oxide 400 milliGRAM(s) Oral three times a day with meals  melatonin 3 milliGRAM(s) Oral at bedtime  methimazole 10 milliGRAM(s) Oral daily  metoclopramide Injectable 10 milliGRAM(s) IV Push three times a day  metoprolol succinate ER 50 milliGRAM(s) Oral at bedtime  metoprolol succinate ER 25 milliGRAM(s) Oral daily  mexiletine 150 milliGRAM(s) Oral every 8 hours  mirtazapine 7.5 milliGRAM(s) Oral at bedtime  multivitamin 1 Tablet(s) Oral daily  pantoprazole  Injectable 40 milliGRAM(s) IV Push two times a day  polyethylene glycol 3350 17 Gram(s) Oral every 12 hours  senna 2 Tablet(s) Oral at bedtime  sertraline 100 milliGRAM(s) Oral daily  sodium chloride 1 Gram(s) Oral every 8 hours  sodium chloride 0.9% lock flush 3 milliLiter(s) IV Push every 8 hours  spironolactone 25 milliGRAM(s) Oral daily      Objective:  Vital Signs Last 24 Hrs  T(C): 36.4 (28 Jan 2022 20:01), Max: 36.4 (28 Jan 2022 04:10)  T(F): 97.5 (28 Jan 2022 20:01), Max: 97.5 (28 Jan 2022 04:10)  HR: 102 (28 Jan 2022 20:01) (91 - 102)  BP: --  BP(mean): 66 (28 Jan 2022 20:01) (66 - 82)  RR: 18 (28 Jan 2022 20:01) (18 - 18)  SpO2: 97% (28 Jan 2022 20:01) (97% - 97%)    PHYSICAL EXAM:  Constitutional:no acute distress  Eyes:ALONSO, EOMI  Ear/Nose/Throat: no oral lesions, 	  Respiratory: clear BL  Cardiovascular: S1S2 VAD sounds  Gastrointestinal:soft, (+) BS, no tenderness  Extremities:no e/e/c  No Lymphadenopathy  IV sites not inflammed.    LABS:                        8.9    7.81  )-----------( 225      ( 28 Jan 2022 06:13 )             27.0     01-28    129<L>  |  94<L>  |  13  ----------------------------<  101<H>  4.0   |  22  |  1.02    Ca    9.6      28 Jan 2022 06:14  Phos  3.1     01-28  Mg     2.0     01-28            MICROBIOLOGY:    Culture - Blood in AM (01.14.22 @ 10:22)    Specimen Source: .Blood Blood    Culture Results:   No Growth Final            Culture - Blood (01.11.22 @ 23:02)    Gram Stain:   Growth in aerobic bottle: Gram Negative Rods  Growth in anaerobic bottle: Gram Negative Rods    -  Amikacin: S <=16    -  Ampicillin: R >16 These ampicillin results predict results for amoxicillin    -  Ampicillin/Sulbactam: R >16/8 Enterobacter, Klebsiella aerogenes, Citrobacter, and Serratia may develop resistance during prolonged therapy (3-4 days)    -  Aztreonam: S <=4    -  Cefazolin: R >16 Enterobacter, Klebsiella aerogenes, Citrobacter, and Serratia may develop resistance during prolonged therapy (3-4 days)    -  Cefepime: S <=2    -  Cefoxitin: R >16    -  Ceftriaxone: S <=1 Enterobacter, Klebsiella aerogenes, Citrobacter, and Serratia may develop resistance during prolonged therapy    -  Ciprofloxacin: R >2    -  Ertapenem: S <=0.5    -  Gentamicin: S <=2    -  Levofloxacin: R 2    -  Meropenem: S <=1    -  Piperacillin/Tazobactam: S <=8    -  Tobramycin: S <=2    -  Trimethoprim/Sulfamethoxazole: S 2/38    -  Serratia marcescens: Detec    Specimen Source: .Blood Blood-Venous    Organism: Blood Culture PCR    Organism: Serratia marcescens    Culture Results:   Growth in aerobic and anaerobic bottles: Serratia marcescens  ***Blood Panel PCR results on this specimen are available  approximately 3 hours after the Gram stain result.***  Gram stain, PCR, and/or culture results may not always  correspond due to difference in methodologies.  ************************************************************  This PCR assay was performed by multiplex PCR. This  Assay tests for 66 bacterial and resistance gene targets.  Please refer to the [facility] test directory  at [email or web address] for details.    Organism Identification: Blood Culture PCR  Serratia marcescens    Method Type: CLAU    Method Type: PCR    RECENT CULTURES:      RADIOLOGY & ADDITIONAL STUDIES:    < from: Xray Kidney Ureter Bladder (01.20.22 @ 07:06) >  IMPRESSION:  Nonobstructive bowel gas pattern.    < end of copied text >

## 2022-01-28 NOTE — PROGRESS NOTE ADULT - PROBLEM SELECTOR PLAN 7
- continues to have poor appetite and abdominal pain   - nutrition following   - s/p calorie count, pt meeting <35% estimated nutritional needs daily.  - will continue to encourage patient to increase to PO intake, will discuss with nutrition about repeating calorie count

## 2022-01-28 NOTE — PROGRESS NOTE ADULT - ASSESSMENT
66yo man with a history of VAD palcement, history of COVID infeciton in April 2020, history of a prolonged hospitalization in 2021 with recurrent sepsis, pneumonia, intubated/trach x2 cycles , chronic gall bladder disease s/p perc dawn, SMA ischemia reuiring a stent placement, cachecxia who was discharged to rehab but is presented from [facility] on 12/13 with AMS, hypotensive, tachycardic found to be COVID (+). Pt is lethargic and mumbling words required ICU admission and pressor support but recovered and is off oxygen on general cardiac floor    Recurrent GNR bacteremia/serratia:  stable on Zosyn therapy  likely splenic abscess- patient reluctant to permit drainage      COVID infection-   confirmed second bout of COVID by documented PCR testing  Ideally, would be interested in sequencing the virus to determine strain (omicron vs delta)  Received a dose of monoclonal antibodies Regener[employer] product   Completed remdesivir therapy  Completed ten days of dexamethasone  Refuses most vaccines- but will need to wait three months  to receive COVID vaccine series post monoclonal  Will administer flu/pneumonia vaccines this admission prior to discharge if he will permit      Recurrent serratia bacteremia  blood cultures clear  serratia sensitive to Zosyn continue present therapy  plan to complete 3-4 weeks IV abx    Likely splenic abscess 4cm collection seen on abdominal CT scan:  Likely embolic in nature  IR consult noted - risks of drainage may outweigh benefits  Patient has cleared bacteremia on current antibiotic but may recur once antibiotics are stopped  Patient voicing that he is not interested in drainage of collection      Prior severe C.diff infection   pre-emptive oral Vanco 125mg bid      Morris Prater MD  [phone]  After 5pm/weekends [phone]

## 2022-01-28 NOTE — PROGRESS NOTE ADULT - SUBJECTIVE AND OBJECTIVE BOX
Subjective: Patient seen and examined resting in bed.    Medications:  acetaminophen     Tablet .. 650 milliGRAM(s) Oral every 6 hours PRN  baclofen 5 milliGRAM(s) Oral every 8 hours  chlorhexidine 2% Cloths 1 Application(s) Topical <User Schedule>  cholecalciferol 1000 Unit(s) Oral daily  dextrose 50% Injectable 25 milliLiter(s) IV Push every 15 minutes  dextrose 50% Injectable 50 milliLiter(s) IV Push every 15 minutes  dronabinol 2.5 milliGRAM(s) Oral two times a day before meals  gabapentin 100 milliGRAM(s) Oral two times a day  magnesium oxide 400 milliGRAM(s) Oral three times a day with meals  melatonin 3 milliGRAM(s) Oral at bedtime  methimazole 10 milliGRAM(s) Oral daily  metoclopramide Injectable 10 milliGRAM(s) IV Push three times a day  metoprolol succinate ER 50 milliGRAM(s) Oral at bedtime  metoprolol succinate ER 25 milliGRAM(s) Oral daily  mexiletine 150 milliGRAM(s) Oral every 8 hours  mirtazapine 7.5 milliGRAM(s) Oral at bedtime  multivitamin 1 Tablet(s) Oral daily  pantoprazole  Injectable 40 milliGRAM(s) IV Push two times a day  piperacillin/tazobactam IVPB.. 3.375 Gram(s) IV Intermittent every 8 hours  polyethylene glycol 3350 17 Gram(s) Oral every 12 hours  senna 2 Tablet(s) Oral at bedtime  sertraline 100 milliGRAM(s) Oral daily  sodium chloride 1 Gram(s) Oral every 8 hours  sodium chloride 0.9% lock flush 3 milliLiter(s) IV Push every 8 hours  spironolactone 25 milliGRAM(s) Oral daily  vancomycin    Solution 125 milliGRAM(s) Oral every 12 hours    Vitals:  Vital Signs Last 24 Hours  T(C): 36.4 (01-28-22 @ 04:10), Max: 36.4 (01-28-22 @ 04:10)  HR: 91 (01-28-22 @ 04:10) (90 - 91)  BP: 102/70 (01-27-22 @ 21:57) (102/70 - 102/70)  RR: 18 (01-28-22 @ 04:10) (18 - 18)  SpO2: 97% (01-28-22 @ 04:10) (95% - 97%)        I&O's Summary    27 Jan 2022 07:01  -  28 Jan 2022 07:00  --------------------------------------------------------  IN: 720 mL / OUT: 1460 mL / NET: -740 mL        Physical Exam  General: No distress. Comfortable.  HEENT: EOM intact.  Neck: Neck supple. JVP not elevated. No masses  Chest: Clear to auscultation bilaterally  CV: +VAD hum  Abdomen: Soft, non-distended, tender RUQ and epigastric region. +bilary drain   Skin: No rashes or skin breakdown  Neurology: Alert and oriented     LVAD Interrogation  VAD TYPE HM 2  Speed 9200  Flow 4.7  Power 4  PI  5.3  Assessment of driveline exit site: driveline dressing c/d/i  Programming changes: no changes made    Labs:                        8.9    7.81  )-----------( 225      ( 28 Jan 2022 06:13 )             27.0     01-28    129<L>  |  94<L>  |  13  ----------------------------<  101<H>  4.0   |  22  |  1.02    Ca    9.6      28 Jan 2022 06:14  Phos  3.1     01-28  Mg     2.0     01-28                Lactate Dehydrogenase, Serum: 254 U/L (01-28 @ 06:14)  Lactate Dehydrogenase, Serum: 249 U/L (01-26 @ 06:36)

## 2022-01-28 NOTE — PROGRESS NOTE ADULT - SUBJECTIVE AND OBJECTIVE BOX
Mich Krueger MD  Pager [phone]  Spectra [phone]  Cell Phone [phone]    Patient is a 66y old  Male who presents with a chief complaint of COVID (+) with AMS and hypotension (27 Jan 2022 14:44)        SUBJECTIVE / OVERNIGHT EVENTS: No new events  TELEMETRY: SR 80-90's      MEDICATIONS  (STANDING):  baclofen 5 milliGRAM(s) Oral every 8 hours  chlorhexidine 2% Cloths 1 Application(s) Topical <User Schedule>  cholecalciferol 1000 Unit(s) Oral daily  dextrose 50% Injectable 25 milliLiter(s) IV Push every 15 minutes  dextrose 50% Injectable 50 milliLiter(s) IV Push every 15 minutes  dronabinol 2.5 milliGRAM(s) Oral two times a day before meals  gabapentin 100 milliGRAM(s) Oral two times a day  magnesium oxide 400 milliGRAM(s) Oral three times a day with meals  melatonin 3 milliGRAM(s) Oral at bedtime  methimazole 10 milliGRAM(s) Oral daily  metoclopramide Injectable 10 milliGRAM(s) IV Push three times a day  metoprolol succinate ER 50 milliGRAM(s) Oral at bedtime  metoprolol succinate ER 25 milliGRAM(s) Oral daily  mexiletine 150 milliGRAM(s) Oral every 8 hours  mirtazapine 7.5 milliGRAM(s) Oral at bedtime  multivitamin 1 Tablet(s) Oral daily  pantoprazole  Injectable 40 milliGRAM(s) IV Push two times a day  piperacillin/tazobactam IVPB.. 3.375 Gram(s) IV Intermittent every 8 hours  polyethylene glycol 3350 17 Gram(s) Oral every 12 hours  senna 2 Tablet(s) Oral at bedtime  sertraline 100 milliGRAM(s) Oral daily  sodium chloride 1 Gram(s) Oral every 8 hours  sodium chloride 0.9% lock flush 3 milliLiter(s) IV Push every 8 hours  spironolactone 25 milliGRAM(s) Oral daily  vancomycin    Solution 125 milliGRAM(s) Oral every 12 hours    MEDICATIONS  (PRN):  acetaminophen     Tablet .. 650 milliGRAM(s) Oral every 6 hours PRN Temp greater or equal to 38C (100.4F), Mild Pain (1 - 3), Moderate Pain (4 - 6)      Vital Signs Last 24 Hrs  T(C): 36.4 (28 Jan 2022 04:10), Max: 36.4 (28 Jan 2022 04:10)  T(F): 97.5 (28 Jan 2022 04:10), Max: 97.5 (28 Jan 2022 04:10)  HR: 91 (28 Jan 2022 04:10) (90 - 91)  BP: 102/70 (27 Jan 2022 21:57) (102/70 - 102/70)  BP(mean): 80 (28 Jan 2022 09:05) (74 - 82)  RR: 18 (28 Jan 2022 04:10) (18 - 18)  SpO2: 97% (28 Jan 2022 04:10) (95% - 97%)  CAPILLARY BLOOD GLUCOSE        I&O's Summary    27 Jan 2022 07:01  -  28 Jan 2022 07:00  --------------------------------------------------------  IN: 720 mL / OUT: 1460 mL / NET: -740 mL          PHYSICAL EXAM  GENERAL: NAD, cachetic  HEAD:  Atraumatic, normocephalic  EYES: EOMI, PERRLA, conjunctiva and sclera clear  CHEST/LUNG: Clear to auscultation bilaterally; no wheezes  HEART: + LVAD hum  ABDOMEN: Soft, tender to palpation in LUQ, nondistended; bowel sounds present; +R sided driveline; +R sided cholecystostomy tube with biliary drainage  EXTREMITIES:  2+ peripheral pulses; no clubbing, cyanosis, or edema  PSYCH: AAOx3      LABS:                        8.9    7.81  )-----------( 225      ( 28 Jan 2022 06:13 )             27.0     01-28    129<L>  |  94<L>  |  13  ----------------------------<  101<H>  4.0   |  22  |  1.02    Ca    9.6      28 Jan 2022 06:14  Phos  3.1     01-28  Mg     2.0     01-28                  RADIOLOGY & ADDITIONAL TESTS:    Imaging Personally Reviewed:  Consultant(s) Notes Reviewed:    Care Discussed with Consultants/Other Providers:

## 2022-01-28 NOTE — PROGRESS NOTE ADULT - PROBLEM SELECTOR PLAN 5
With LVAD  Continue spironolactone 25 mg QD  Continue metoprolol succinate 25 mg am and 50 mg QHS  Continue salt tabs for hyponatremia   Multiple runs of WCT- continue Mexiletine 150 mg q8  Off ASA due to downtrend in hgb

## 2022-01-28 NOTE — PROGRESS NOTE ADULT - ASSESSMENT
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 s/p HM2 LVAD in 9/2017 at  (due to severe PAD) with TV ring, multiple GI bleeds, thus maintained off AC, CKD 3prior COVID-19 infection in 4/2020, recurrent syncope post LVAD s/p ILR, s/p prolonged complex hospitalization from 6/14-11/16/2021 initially admitted with abdominal pain complicated by GIB, respiratory failure s/p trach, multiple infections, s/p cholecystotomy tube and SMA stent 10/2021, ultimately discharged to [facility] rehab and then returned to [facility] for trach decannulation 12/2 who now presents with recurrent COVID-19 infection, initially in distributive shock. 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     He had elevated inflammatory markers which are now improving. He received monoclonal antibodies and was started on remdesivir and dexamethasone. He's afebrile with BCx from 12/18 and 12/30 NGTD. He had an episode of hypoxia noted 1/1, possibly in the setting of aspiration. He is currently saturating well on RA. He had runs of MMVT in setting of hypokalemia and being off his beta blocker which has since resolved. He currently appears well compensated from HF perspective. His LDH is stable. His LVAD is functioning well without s/s of pump malfunction.     Recently he was noted to have positive blood culture for serratia marcescens. His Hgb has overall remain stable though he is off anticoagulation/ antiplatelets. He now has a decline in his appetite and is noted to have worsening abdominal pain. His prognosis is guarded, palliative care was consulted. Family meeting held 1/26 to emphasize importance of nutrition and physical therapy.

## 2022-01-28 NOTE — CHART NOTE - NSCHARTNOTEFT_GEN_A_CORE
Nutrition Follow Up Note  Patient seen for: Calorie Count assessment    Chart reviewed, events noted. Per chart: 66M, PMH of Stage D NICM s/p HM2 LVAD (2017), multiple GI bleeds (thus maintained off AC), CKD 3, prior COVID-19 infection (2020), s/p prolonged complex hospitalization from -2021 initially admitted with abdominal pain c/b GIB, respiratory failure s/p trach, multiple infections, s/p cholecystotomy tube and SMA stent 10/2021, ultimately discharged to [facility] rehab and then returned to [facility] for trach decannulation  who now presents with recurrent COVID-19 infection in shock. 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  He had an episode of hypoxia noted , possibly in the setting of aspiration. Now CT finding of splenic abscess medically managing.     Source: EMR, Team, Pt    Diet, Regular:   Supplement Feeding Modality:  Oral  Ensure Enlive Cans or Servings Per Day:  1       Frequency:  Three Times a day (22 @ 11:38)    Is current diet order appropriate/adequate? [x] Yes  []  No: however, pt has been with prolonged suboptimal PO intake, consuming <50% at meals.   Have discussed pt's dietary options multiple times in efforts to optimize his overall nutritional intake. Upon recent visit this RD had with pt on , discussion with pt was had on improved intake upon prior admission when he was on pureed diet - pt recalled instance and noted he would be amenable to trying pureed diet again as it doesn't require as much energy to chew/swallow pureed food. However, per team, pt was requesting turkey sandwich a day later so his diet was changed back to Regular textured diet. RD continues to work with patient to obtain dietary preferences and provide as feasible.     Calorie Count initiated -, results as follows:     () 740kcals & 33g protein     () 310kcals & 15g protein     () 545kcals & 16g protein  Average 3 Day PO intake: 532kcals & 21g protein - Meets 32% low end energy needs & 25% low end protein needs     Pt appeared to be in better spirits today upon RD visit. RD observed whole breakfast tray consumed (eggs, oatmeal, fruit cup) along with 1 Ensure supplement. Appears from RD visits and calorie count, pt with most intake at breakfast. Noted pt's increased nutritional needs at this time and ways to optimize nutritional intake while with decreased appetite. Discussed consuming small frequent meals, keeping nonperishable food items at bedside to consume throughout the day, and drinking oral nutrition supplement between meals.     Nutrition-Related Events:  - Marinol added  in efforts to increase appetite; Remeron ordered since admit   - Prolonged hyponatremia - NaCl tabs ordered daily   - Multivitamin, MagOx, and Vitamin D3 supplementation ordered daily  - MBS 1/3 with SLP recommendations for "Regular diet and thin liquids"  - Swallow eval  with SLP recommendations for "Regular solids and thin liquids"  - Upon prior hospitalization was with EN for prolonged period and then advanced to PO diet but was with some dysphagia.    GI:  Last documented BM .   Bowel Regimen? [x] Yes (miralax, senna)  [] No  - Noted on abx course at this time  - Reglan ordered since admit  for gut motility  - Sybil drain remains in place since PTA    Daily Weights in K.5 (), 52.3 (), 52.3 (), 53.2 (), 52.3 (), 54.1 (), 53.4 (), 52.4 (), 52.8 (), 52.9 (), 52.8 (01-15), 66.5 (-14 ? accuracy), 51.1 (), 50.1 (-), 54.6 (), 55.7 (12-15)  - Noted weight has downtrended since admit; likely multifactorial as pt has been receiving aldactone as well as with suboptimal PO intake at times. Will continue to monitor/trend weight status     Drug Dosing Weight  Weight (kg): 55.7 ()  BMI (kg/m2): 16.7 ()  IBW: 178 Ibs/80.7 kg      MEDICATIONS  (STANDING):  cholecalciferol  dextrose 50% Injectable  dextrose 50% Injectable  magnesium oxide  methimazole  metoprolol succinate ER  metoprolol succinate ER  mexiletine  multivitamin  pantoprazole  Injectable  piperacillin/tazobactam IVPB..  polyethylene glycol 3350  senna  sodium chloride  sodium chloride 0.9% lock flush  spironolactone  vancomycin    Solution    Pertinent Labs:  @ 06:14: Na 129<L>, BUN 13, Cr 1.02, <H>, K+ 4.0, Phos 3.1, Mg 2.0, Alk Phos --, ALT/SGPT --, AST/SGOT --, HbA1c --    A1C with Estimated Average Glucose Result: 5.4 % (08-15-21 @ 13:52)    Finger Sticks:    Skin per nursing documentation: no pressure injuries noted  Edema: none    Estimated Nutritional Needs  Based on 12/15 weight: 122.7 lb/55.6 kg  Energy Needs (30-35 kcals/kg): 8339-6738  Protein Needs (1.5-1.8 g/kg): 83.4-100.08    Previous Nutrition Diagnosis: Severe-chronic Malnutrition & Underweight  Nutrition Diagnosis is: [x] ongoing - addressed with PO diet, oral nutrition and micronutrient supplementation    New Nutrition Diagnosis: [x] Not applicable    Nutrition Care Plan:  [x] In Progress  [] Achieved  [] Not applicable    Recommendations:      1. Calorie Count completed, pt meeting <35% estimated nutritional needs daily.  2.  Continue diet as ordered with Ensure Enlive TID  - Pending need for EN (if within GOC) would recommend initiating Jevity 1.2 @10ml/hr x 24hrs; RD to follow-up and reassess pending EN initiation as pt at high risk for developing refeeding syndrome  3. Continue micronutrient supplementation as ordered; recommend thiamin supplementation in setting of prolonged suboptimal PO intake - Pt at risk for refeeding syndrome.  4. Continue with appetite stimulants at discretion of team  5. Continue to provide assistance as necessary with encouragement of PO intake at meals/snacks    Monitoring and Evaluation:   Continue to monitor nutritional intake, tolerance to diet prescription, weights, labs, skin integrity  RD remains available upon request and will follow up per protocol    Wendy Travis, MS, RD, CDN, CNSC  pager #[phone]

## 2022-01-29 NOTE — PROGRESS NOTE ADULT - ASSESSMENT
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 s/p HM2 LVAD in 9/2017 at  (due to severe PAD) with TV ring, multiple GI bleeds, thus maintained off AC, CKD 3prior COVID-19 infection in 4/2020, recurrent syncope post LVAD s/p ILR, s/p prolonged complex hospitalization from 6/14-11/16/2021 initially admitted with abdominal pain complicated by GIB, respiratory failure s/p trach, multiple infections, s/p cholecystotomy tube and SMA stent 10/2021, ultimately discharged to [facility] rehab and then returned to [facility] for trach decannulation 12/2 who now presents with recurrent COVID-19 infection, initially in distributive shock. 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     He had elevated inflammatory markers which are now improving. He received monoclonal antibodies and was started on remdesivir and dexamethasone. He's afebrile with BCx from 12/18 and 12/30 NGTD. He had an episode of hypoxia noted 1/1, possibly in the setting of aspiration. He is currently saturating well on RA. He had runs of MMVT in setting of hypokalemia and being off his beta blocker which has since resolved. He currently appears well compensated from HF perspective. His LDH is stable. His LVAD is functioning well without s/s of pump malfunction.     Was noted to have positive blood culture for serratia marcescens on 1/11 and placed on Zosyn with plan for 3-4 weeks. Also had CT scan with concern of abscess but not pursuing drainage. His Hgb has overall remain stable though he is off anticoagulation/ antiplatelets. He now has a decline in his appetite and is noted to have worsening abdominal pain. His prognosis is guarded, palliative care was consulted. Family meeting held 1/26 to emphasize importance of nutrition and physical therapy.

## 2022-01-29 NOTE — PROGRESS NOTE ADULT - SUBJECTIVE AND OBJECTIVE BOX
Patient is a 66y old  Male who presents with a chief complaint of COVID (+) with AMS and hypotension (29 Jan 2022 12:45)      SUBJECTIVE / OVERNIGHT EVENTS: Patient seen and examined at bedside. No acute events overnight. He has improved pain and intermittent hiccups. He denies chest pain or SOB.    REVIEW OF SYSTEMS: 11 point ROS negative except as above    MEDICATIONS  (STANDING):  baclofen 5 milliGRAM(s) Oral every 8 hours  bisacodyl 5 milliGRAM(s) Oral every 12 hours  chlorhexidine 2% Cloths 1 Application(s) Topical <User Schedule>  cholecalciferol 1000 Unit(s) Oral daily  dextrose 50% Injectable 50 milliLiter(s) IV Push every 15 minutes  dextrose 50% Injectable 25 milliLiter(s) IV Push every 15 minutes  dronabinol 2.5 milliGRAM(s) Oral two times a day before meals  gabapentin Solution 125 milliGRAM(s) Oral three times a day  HYDROmorphone  Injectable 0.2 milliGRAM(s) IV Push every 8 hours  magnesium oxide 400 milliGRAM(s) Oral three times a day with meals  melatonin 3 milliGRAM(s) Oral at bedtime  methimazole 10 milliGRAM(s) Oral daily  metoclopramide Injectable 10 milliGRAM(s) IV Push four times a day  metoprolol succinate ER 50 milliGRAM(s) Oral at bedtime  metoprolol succinate ER 25 milliGRAM(s) Oral daily  mexiletine 150 milliGRAM(s) Oral every 8 hours  mirtazapine 7.5 milliGRAM(s) Oral at bedtime  multivitamin 1 Tablet(s) Oral daily  pantoprazole  Injectable 40 milliGRAM(s) IV Push two times a day  piperacillin/tazobactam IVPB.. 3.375 Gram(s) IV Intermittent every 8 hours  polyethylene glycol 3350 17 Gram(s) Oral daily  senna 2 Tablet(s) Oral at bedtime  sertraline 100 milliGRAM(s) Oral daily  sodium chloride 1 Gram(s) Oral every 8 hours  sodium chloride 0.9% lock flush 3 milliLiter(s) IV Push every 8 hours  spironolactone 25 milliGRAM(s) Oral daily  vancomycin    Solution 125 milliGRAM(s) Oral every 12 hours    MEDICATIONS  (PRN):  acetaminophen     Tablet .. 650 milliGRAM(s) Oral every 6 hours PRN Temp greater or equal to 38C (100.4F), Mild Pain (1 - 3), Moderate Pain (4 - 6)  HYDROmorphone  Injectable 0.2 milliGRAM(s) IV Push every 2 hours PRN Moderate to Severe pain      CAPILLARY BLOOD GLUCOSE        I&O's Summary    28 Jan 2022 07:01  -  29 Jan 2022 07:00  --------------------------------------------------------  IN: 780 mL / OUT: 1310 mL / NET: -530 mL        PHYSICAL EXAM:  Vital Signs Last 24 Hrs  T(C): 36.8 (29 Jan 2022 08:00), Max: 36.8 (29 Jan 2022 08:00)  T(F): 98.3 (29 Jan 2022 08:00), Max: 98.3 (29 Jan 2022 08:00)  HR: 97 (29 Jan 2022 08:00) (91 - 102)  BP: --  BP(mean): 80 (29 Jan 2022 08:00) (66 - 82)  RR: 18 (29 Jan 2022 08:00) (18 - 18)  SpO2: 98% (29 Jan 2022 08:00) (97% - 98%)    GEN: male in NAD, appears comfortable, no diaphoresis  EYES: No scleral injection, PERRL, EOMI  ENTM: neck supple & symmetric without tracheal deviation, moist membranes, no gross hearing impairment, thyroid gland not enlarged  CV: +S1/S2, no m/r/g, no abdominal bruit, no LE edema; +LVAD  RESP: breathing comfortably, no respiratory accessory muscle use, CTAB, no w/r/r  GI: normoactive BS, soft, NTND, no rebounding/guarding, no palpable masses; +right cholecystostomy     LABS:                        8.5    7.66  )-----------( 212      ( 29 Jan 2022 06:35 )             26.2     01-29    129<L>  |  96  |  15  ----------------------------<  75  4.1   |  22  |  0.98    Ca    9.7      29 Jan 2022 06:35  Phos  3.1     01-28  Mg     1.8     01-29                  RADIOLOGY & ADDITIONAL TESTS:  Results Reviewed:   Imaging Personally Reviewed:  Electrocardiogram Personally Reviewed:    COORDINATION OF CARE:  Care Discussed with Consultants/Other Providers [Y/N]:  Prior or Outpatient Records Reviewed [Y/N]:

## 2022-01-29 NOTE — PROGRESS NOTE ADULT - PROBLEM SELECTOR PLAN 7
Will continue to f/u for Symptoms.            Francisco Burgos MD   Geriatrics and Palliative Care ([facility]) Consult Service    of Geriatric and Palliative Medicine  [facility]      Please page the following number for clinical matters between the hours of 9 am and 5 pm from Monday through Friday : [phone]    After 5pm and on weekends, please see the contact information below:    In the event of newly developing, evolving, or worsening symptoms, please contact the Palliative Medicine team via pager (if the patient is at [facility] #[phone] or if the patient is at [facility] #[phone]) The Geriatric and Palliative Medicine service has coverage 24 hours a day/ 7 days a week to provide medical recommendations regarding symptom management needs via telephone Will continue to f/u for Symptoms.    My inputs were d/w Dr. Son ()           Francisco Burgos MD   Geriatrics and Palliative Care ([facility]) Consult Service    of Geriatric and Palliative Medicine  [facility]      Please page the following number for clinical matters between the hours of 9 am and 5 pm from Monday through Friday : [phone]    After 5pm and on weekends, please see the contact information below:    In the event of newly developing, evolving, or worsening symptoms, please contact the Palliative Medicine team via pager (if the patient is at [facility] #[phone] or if the patient is at [facility] #[phone]) The Geriatric and Palliative Medicine service has coverage 24 hours a day/ 7 days a week to provide medical recommendations regarding symptom management needs via telephone

## 2022-01-29 NOTE — PROGRESS NOTE ADULT - PROBLEM SELECTOR PLAN 2
- hx of Serratia bacteremia, blood cultures 1/14 NGTD  - currently on IV zosyn (1/12-) and Vanco PO for C. Diff ppx   - ID following  - CT C/A/P 1/19 shows hypoenhancing lesion in the spleen measuring 4.0 cm.   - IR consulted, as the patient is not acutely ill at the moment with cleared Bcx, holding off on intervention for now. Risks outweigh benefits.

## 2022-01-29 NOTE — PROGRESS NOTE ADULT - SUBJECTIVE AND OBJECTIVE BOX
Interval History:  feels well  hasn't had BM in several days  eating meals    Medications:  acetaminophen     Tablet .. 650 milliGRAM(s) Oral every 6 hours PRN  baclofen 5 milliGRAM(s) Oral every 8 hours  bisacodyl 5 milliGRAM(s) Oral every 12 hours  chlorhexidine 2% Cloths 1 Application(s) Topical <User Schedule>  cholecalciferol 1000 Unit(s) Oral daily  dextrose 50% Injectable 25 milliLiter(s) IV Push every 15 minutes  dextrose 50% Injectable 50 milliLiter(s) IV Push every 15 minutes  dronabinol 2.5 milliGRAM(s) Oral two times a day before meals  gabapentin Solution 125 milliGRAM(s) Oral three times a day  HYDROmorphone  Injectable 0.2 milliGRAM(s) IV Push every 8 hours  HYDROmorphone  Injectable 0.2 milliGRAM(s) IV Push every 2 hours PRN  magnesium oxide 400 milliGRAM(s) Oral three times a day with meals  melatonin 3 milliGRAM(s) Oral at bedtime  methimazole 10 milliGRAM(s) Oral daily  metoclopramide Injectable 10 milliGRAM(s) IV Push four times a day  metoprolol succinate ER 50 milliGRAM(s) Oral at bedtime  metoprolol succinate ER 25 milliGRAM(s) Oral daily  mexiletine 150 milliGRAM(s) Oral every 8 hours  mirtazapine 7.5 milliGRAM(s) Oral at bedtime  multivitamin 1 Tablet(s) Oral daily  pantoprazole  Injectable 40 milliGRAM(s) IV Push two times a day  piperacillin/tazobactam IVPB.. 3.375 Gram(s) IV Intermittent every 8 hours  polyethylene glycol 3350 17 Gram(s) Oral daily  senna 2 Tablet(s) Oral at bedtime  sertraline 100 milliGRAM(s) Oral daily  sodium chloride 1 Gram(s) Oral every 8 hours  sodium chloride 0.9% lock flush 3 milliLiter(s) IV Push every 8 hours  spironolactone 25 milliGRAM(s) Oral daily  vancomycin    Solution 125 milliGRAM(s) Oral every 12 hours      Vitals:  T(C): 36.8 (22 @ 08:00), Max: 36.8 (22 @ 08:00)  HR: 97 (22 @ 08:00) (91 - 102)  BP: --  BP(mean): 80 (22 @ 08:00) (66 - 82)  ABP: --  ABP(mean): --  RR: 18 (22 @ 08:00) (18 - 18)  SpO2: 98% (22 @ 08:00) (97% - 98%)    Daily     Daily Weight in k.5 (2022 09:00)        I&O's Summary    2022 07:01  -  2022 07:00  --------------------------------------------------------  IN: 780 mL / OUT: 1310 mL / NET: -530 mL    2022 07:01  -  2022 15:01  --------------------------------------------------------  IN: 780 mL / OUT: 600 mL / NET: 180 mL      Physical Exam:  Appearance: No Acute Distress. Cachectic  HEENT: PERRL  Neck: No JVD  Cardiovascular: Normal S1 S2, No murmurs/rubs/gallops. VAD hum  Respiratory: Clear to auscultation bilaterally  Gastrointestinal: Soft, Non-tender	  Skin: No cyanosis	  Neurologic: Non-focal  Extremities: No LE edema  Psychiatry: A & O x 3, Mood & affect appropriate    LVAD Interrogation:  Pump Speed: 9200  Pump Flow: 4.8  Pulse Index: 4.8  Pump Power: 3.8  VAD Events: PI events w/o speed drops  Driveline evaluation:  c/d/i  Programming Changes: No changes made    Labs:                        8.5    7.66  )-----------( 212      ( 2022 06:35 )             26.2         129<L>  |  96  |  15  ----------------------------<  75  4.1   |  22  |  0.98    Ca    9.7      2022 06:35  Phos  3.1     -28  Mg     1.8

## 2022-01-29 NOTE — PROGRESS NOTE ADULT - PROBLEM SELECTOR PLAN 2
? 2/2 to splenic abscess + constipation.   Abx as per ID   Senna 2 tabs hs, will change Miralax to qd, and will add Dulcolax 5mg q 12 hours.   If symptoms are recurrent then consider f/u imaging studies.

## 2022-01-29 NOTE — PROGRESS NOTE ADULT - PROBLEM SELECTOR PLAN 1
Blood Cx (1/11) grew serratia- on IV Zosyn- continue for 4 weeks until mid-february (cleared cultures 1/14)  CT A/P showing splenic abscess 4cm in size - see section below for management

## 2022-01-29 NOTE — PROGRESS NOTE ADULT - ASSESSMENT
67 y/o M with Stage D NICM s/p HM2 LVAD in 9/2017 at  (due to severe PAD) with TV ring, multiple GI bleeds, thus maintained off AC, CKD 3prior COVID-19 infection in 4/2020, recurrent syncope post LVAD s/p ILR, s/p prolonged complex hospitalization from 6/14-11/16/2021 initially admitted with abdominal pain complicated by GIB, respiratory failure s/p trach, multiple infections, s/p cholecystotomy tube and SMA stent 10/2021, ultimately discharged to [facility] rehab and then returned to [facility] for trach decannulation 12/2 who now presents with recurrent COVID-19 infection, initially in distributive shock. 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 Though from the COVID and respiratory point of view, his condition has improved, he now has a decline in his appetite, is refusing to take certain medications and is noted to have worsening abdominal pain with new CT findings suggesting Splenic Abscess. He was seen by IR that based on negative blood Cx and clinical stability is hesitant to IR drainage.

## 2022-01-29 NOTE — PROGRESS NOTE ADULT - SUBJECTIVE AND OBJECTIVE BOX
Enrike Creole  ID # [id number] [place]     HPI: 65 y/o M with Stage D NICM s/p HM2 LVAD in 9/2017 at  (due to severe PAD) with TV ring, multiple GI bleeds, thus maintained off AC, CKD 3prior COVID-19 infection in 4/2020, recurrent syncope post LVAD s/p ILR, s/p prolonged complex hospitalization from 6/14-11/16/2021 initially admitted with abdominal pain complicated by GIB, respiratory failure s/p trach, multiple infections, s/p cholecystotomy tube and SMA stent 10/2021, ultimately discharged to [facility] and then returned to [facility] for trach decannulation 12/2 who now presents with recurrent COVID-19 infection, initially in distributive shock. 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 Though from the COVID and respiratory point of view, his condition has improved, he now has a decline in his appetite, is refusing to take certain medications and is noted to have worsening abdominal pain with new CT findings suggesting Splenic Abscess. He was seen by IR that based on negative blood Cx and clinical stability is hesitant to IR drainage.      SUBJECTIVE AND OBJECTIVE: Though hiccups has improved, it is still recurrent and impairing his capacity to eat. He was also c/o LLQ pain which is intermittent and improves with pain meds. Finally, he was c/o constipation (last documented BM was on 1/25). He is trying to eat. He appeared to be in better spirits today.      INTERVAL HPI/OVERNIGHT EVENTS: No new events.     DNR on chart:   Allergies    Amiodarone Hydrochloride (Other (Severe))    Intolerances    MEDICATIONS  (STANDING):  baclofen 5 milliGRAM(s) Oral every 8 hours  bisacodyl 5 milliGRAM(s) Oral every 12 hours  chlorhexidine 2% Cloths 1 Application(s) Topical <User Schedule>  cholecalciferol 1000 Unit(s) Oral daily  dextrose 50% Injectable 25 milliLiter(s) IV Push every 15 minutes  dextrose 50% Injectable 50 milliLiter(s) IV Push every 15 minutes  dronabinol 2.5 milliGRAM(s) Oral two times a day before meals  gabapentin Solution 125 milliGRAM(s) Oral three times a day  HYDROmorphone  Injectable 0.2 milliGRAM(s) IV Push every 8 hours  magnesium oxide 400 milliGRAM(s) Oral three times a day with meals  melatonin 3 milliGRAM(s) Oral at bedtime  methimazole 10 milliGRAM(s) Oral daily  metoclopramide Injectable 10 milliGRAM(s) IV Push three times a day  metoprolol succinate ER 50 milliGRAM(s) Oral at bedtime  metoprolol succinate ER 25 milliGRAM(s) Oral daily  mexiletine 150 milliGRAM(s) Oral every 8 hours  mirtazapine 7.5 milliGRAM(s) Oral at bedtime  multivitamin 1 Tablet(s) Oral daily  pantoprazole  Injectable 40 milliGRAM(s) IV Push two times a day  piperacillin/tazobactam IVPB.. 3.375 Gram(s) IV Intermittent every 8 hours  polyethylene glycol 3350 17 Gram(s) Oral daily  senna 2 Tablet(s) Oral at bedtime  sertraline 100 milliGRAM(s) Oral daily  sodium chloride 1 Gram(s) Oral every 8 hours  sodium chloride 0.9% lock flush 3 milliLiter(s) IV Push every 8 hours  spironolactone 25 milliGRAM(s) Oral daily  vancomycin    Solution 125 milliGRAM(s) Oral every 12 hours    MEDICATIONS  (PRN):  acetaminophen     Tablet .. 650 milliGRAM(s) Oral every 6 hours PRN Temp greater or equal to 38C (100.4F), Mild Pain (1 - 3), Moderate Pain (4 - 6)  HYDROmorphone  Injectable 0.2 milliGRAM(s) IV Push every 2 hours PRN Moderate to Severe pain      ITEMS UNCHECKED ARE NOT PRESENT    PRESENT SYMPTOMS: [ ]Unable to obtain due to poor mentation   Source if other than patient:  [ ]Family   [ ]Team     Pain:  [ x]yes [ ]no  QOL impact - Not feeling comfortable.   Location -                  Left flank/lower qudrant    Aggravating factors - palpation   Quality - not able to indicate  Radiation - none   Timing- Intermittent   Severity (0-10 scale): Moderate   Minimal acceptable level (0-10 scale): Mild to moderate      Dyspnea:                           [ ]Mild [ ]Moderate [ ]Severe  Anxiety:                             [ ]Mild [ ]Moderate [ ]Severe  Fatigue:                             [ ]Mild [ ]Moderate [ ]Severe  Nausea:                             [ ]Mild [ ]Moderate [ ]Severe  Loss of appetite:              [x ]Mild [ ]Moderate [ ]Severe  Constipation:                    [ ]Mild [ ]Moderate [ ]Severe    CPOT:    https://www.[facility].org/getattachment/[email or web address]/Critical-Care-Pain-Observation-Tool-(CPOT)    PAIN AD Score:	  http://geriatrictoolkit.[facility]/cog/painad.pdf (Ctrl + left click to view)    Other Symptoms:  [x ]All other review of systems negative but as per subjective above.     Palliative Performance Status Version 2:   40      %      http://[email or web address].org/files/news/palliative_performance_scale_ppsv2.pdf  PHYSICAL EXAM:  Vital Signs Last 24 Hrs  T(C): 36.8 (29 Jan 2022 08:00), Max: 36.8 (29 Jan 2022 08:00)  T(F): 98.3 (29 Jan 2022 08:00), Max: 98.3 (29 Jan 2022 08:00)  HR: 97 (29 Jan 2022 08:00) (91 - 102)  BP: --  BP(mean): 80 (29 Jan 2022 08:00) (66 - 82)  RR: 18 (29 Jan 2022 08:00) (18 - 18)  SpO2: 98% (29 Jan 2022 08:00) (97% - 98%)    GENERAL:  [x ]Alert  [ x]Oriented x 2 (person and place)  [ ]Lethargic  [x ]Cachexia  [ ]Unarousable  [x ]Verbal  [ ]Non-Verbal  Behavioral:   [ ]Anxiety  [ ]Delirium [ ]Agitation [ ]Other  HEENT:  [x ]Normal   [ ]Dry mouth   [ ]ET Tube/Trach  [ ]Oral lesions  PULMONARY:   [ ]Clear [ ]Tachypnea  [ ]Audible excessive secretions   [ ]Rhonchi        [ ]Right [ ]Left [ ]Bilateral  [ ]Crackles        [ ]Right [ ]Left [ ]Bilateral  [ ]Wheezing     [ ]Right [ ]Left [ ]Bilateral  [x ]Diminished BS [ ] Right [ ]Left [x ]Bilateral  CARDIOVASCULAR:    [x ]Regular [ ]Irregular [ ]Tachy  [ ]Jose [ ]Murmur [x ]Other: LVAD   GASTROINTESTINAL:  [ ]Soft  [ ]Distended   [x ]+BS  [ ]Non tender [x ]Tender over left flank. No Rebound or guarding  [ ]PEG [ ]OGT/ NGT   Last BM:  1/25  [x] Biliary drain   GENITOURINARY:  [x ]Normal [ ]Incontinent   [ ]Oliguria/Anuria   [ ]Landrum  MUSCULOSKELETAL:   [ ]Normal   [x ]Weakness  [ ]Bed/Wheelchair bound [ ]Edema  NEUROLOGIC:   [ ]No focal deficits  [ ] Cognitive impairment  [ ] Dysphagia [ ]Dysarthria [ ] Paresis [x ]Other:    SKIN:   [x ]Normal  [ ]Rash   [ ]Pressure ulcer(s) [ ]y [ ]n present on admission  Normal   CRITICAL CARE:  [ ]Shock Present  [ ]Septic [ ]Cardiogenic [ ]Neurologic [ ]Hypovolemic  [ ]Vasopressors [ ]Inotropes  [ ]Respiratory failure present [ ]Mechanical Ventilation [ ]Non-invasive ventilatory support [ ]High-Flow   [ ]Acute  [ ]Chronic [ ]Hypoxic  [ ]Hypercarbic [ ]Other  [ ]Other organ failure     LABS:                                            8.5    7.66  )-----------( 212      ( 29 Jan 2022 06:35 )             26.2   01-29    129<L>  |  96  |  15  ----------------------------<  75  4.1   |  22  |  0.98    Ca    9.7      29 Jan 2022 06:35  Phos  3.1     01-28  Mg     1.8     01-29              RADIOLOGY & ADDITIONAL STUDIES:  < from: CT Abdomen and Pelvis w/ IV Cont (01.19.22 @ 13:47) >  IMPRESSION:  *  Status post LVAD. No evidence of fluid collection adjacent to the LVAD.  *  Hypoenhancing lesion in the spleen measuring 4.0 cm, new compared to   prior examination 10/5/2021. In the appropriate clinical setting, splenic   abscess may be considered.        --- End of Report ---            MARY SUAREZ MD; Attending Radiologist  This document has been electronically signed. Jan 19 2022  3:00PM    < end of copied text >    Protein Calorie Malnutrition Present: [ ]mild [ ]moderate [ ]severe [ ]underweight [ ]morbid obesity  https://www.andeal.org/vault/6214/web/files/ONC/Table_Clinical%20Characteristics%20to%20Document%20Malnutrition-White%20JV%20et%20al%306670.pdf    Height (cm): 182.9 (12-13-21 @ 04:30), 165.1 (12-02-21 @ 13:55), 177.8 (10-18-21 @ 15:55)  Weight (kg): 52.6 (01-12-22 @ 12:50), 53.6 (12-02-21 @ 13:55), 61.6 (10-18-21 @ 15:55)  BMI (kg/m2): 15.7 (01-12-22 @ 12:50), 16 (12-13-21 @ 04:30), 19.7 (12-02-21 @ 13:55)    [ ]PPSV2 < or = 30%  [ ]significant weight loss [ ]poor nutritional intake [ ]anasarca    [ ]Artificial Nutrition    REFERRALS:   [ ]Chaplaincy  [ ]Hospice  [ ]Child Life  [ ]Social Work  [ ]Case management [ ]Holistic Therapy     Goals of Care Document:     Enrike Creole  ID # [id number] [place]     HPI: 65 y/o M with Stage D NICM s/p HM2 LVAD in 9/2017 at  (due to severe PAD) with TV ring, multiple GI bleeds, thus maintained off AC, CKD 3prior COVID-19 infection in 4/2020, recurrent syncope post LVAD s/p ILR, s/p prolonged complex hospitalization from 6/14-11/16/2021 initially admitted with abdominal pain complicated by GIB, respiratory failure s/p trach, multiple infections, s/p cholecystotomy tube and SMA stent 10/2021, ultimately discharged to [facility] and then returned to [facility] for trach decannulation 12/2 who now presents with recurrent COVID-19 infection, initially in distributive shock. 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 Though from the COVID and respiratory point of view, his condition has improved, he now has a decline in his appetite, is refusing to take certain medications and is noted to have worsening abdominal pain with new CT findings suggesting Splenic Abscess. He was seen by IR that based on negative blood Cx and clinical stability is hesitant to IR drainage.      SUBJECTIVE AND OBJECTIVE: Though hiccups have improved, they are still recurrent and impairing his capacity to eat. He was also c/o LLQ pain which is intermittent and improves with pain meds. Finally, he was c/o constipation (last documented BM was on 1/25). He is trying to eat. He appeared to be in better spirits today.      INTERVAL HPI/OVERNIGHT EVENTS: No new events.     DNR on chart:   Allergies    Amiodarone Hydrochloride (Other (Severe))    Intolerances    MEDICATIONS  (STANDING):  baclofen 5 milliGRAM(s) Oral every 8 hours  bisacodyl 5 milliGRAM(s) Oral every 12 hours  chlorhexidine 2% Cloths 1 Application(s) Topical <User Schedule>  cholecalciferol 1000 Unit(s) Oral daily  dextrose 50% Injectable 25 milliLiter(s) IV Push every 15 minutes  dextrose 50% Injectable 50 milliLiter(s) IV Push every 15 minutes  dronabinol 2.5 milliGRAM(s) Oral two times a day before meals  gabapentin Solution 125 milliGRAM(s) Oral three times a day  HYDROmorphone  Injectable 0.2 milliGRAM(s) IV Push every 8 hours  magnesium oxide 400 milliGRAM(s) Oral three times a day with meals  melatonin 3 milliGRAM(s) Oral at bedtime  methimazole 10 milliGRAM(s) Oral daily  metoclopramide Injectable 10 milliGRAM(s) IV Push three times a day  metoprolol succinate ER 50 milliGRAM(s) Oral at bedtime  metoprolol succinate ER 25 milliGRAM(s) Oral daily  mexiletine 150 milliGRAM(s) Oral every 8 hours  mirtazapine 7.5 milliGRAM(s) Oral at bedtime  multivitamin 1 Tablet(s) Oral daily  pantoprazole  Injectable 40 milliGRAM(s) IV Push two times a day  piperacillin/tazobactam IVPB.. 3.375 Gram(s) IV Intermittent every 8 hours  polyethylene glycol 3350 17 Gram(s) Oral daily  senna 2 Tablet(s) Oral at bedtime  sertraline 100 milliGRAM(s) Oral daily  sodium chloride 1 Gram(s) Oral every 8 hours  sodium chloride 0.9% lock flush 3 milliLiter(s) IV Push every 8 hours  spironolactone 25 milliGRAM(s) Oral daily  vancomycin    Solution 125 milliGRAM(s) Oral every 12 hours    MEDICATIONS  (PRN):  acetaminophen     Tablet .. 650 milliGRAM(s) Oral every 6 hours PRN Temp greater or equal to 38C (100.4F), Mild Pain (1 - 3), Moderate Pain (4 - 6)  HYDROmorphone  Injectable 0.2 milliGRAM(s) IV Push every 2 hours PRN Moderate to Severe pain      ITEMS UNCHECKED ARE NOT PRESENT    PRESENT SYMPTOMS: [ ]Unable to obtain due to poor mentation   Source if other than patient:  [ ]Family   [ ]Team     Pain:  [ x]yes [ ]no  QOL impact - Not feeling comfortable.   Location -                  Left flank/lower qudrant    Aggravating factors - palpation   Quality - not able to indicate  Radiation - none   Timing- Intermittent   Severity (0-10 scale): Moderate   Minimal acceptable level (0-10 scale): Mild to moderate      Dyspnea:                           [ ]Mild [ ]Moderate [ ]Severe  Anxiety:                             [ ]Mild [ ]Moderate [ ]Severe  Fatigue:                             [ ]Mild [ ]Moderate [ ]Severe  Nausea:                             [ ]Mild [ ]Moderate [ ]Severe  Loss of appetite:              [x ]Mild [ ]Moderate [ ]Severe  Constipation:                    [ ]Mild [ ]Moderate [ ]Severe    CPOT:    https://www.[facility].org/getattachment/[email or web address]/Critical-Care-Pain-Observation-Tool-(CPOT)    PAIN AD Score:	  http://geriatrictoolkit.[email or web address]/cog/painad.pdf (Ctrl + left click to view)    Other Symptoms:  [x ]All other review of systems negative but as per subjective above.     Palliative Performance Status Version 2:   40      %      http://[email or web address]/files/news/palliative_performance_scale_[email or web address].pdf  PHYSICAL EXAM:  Vital Signs Last 24 Hrs  T(C): 36.8 (29 Jan 2022 08:00), Max: 36.8 (29 Jan 2022 08:00)  T(F): 98.3 (29 Jan 2022 08:00), Max: 98.3 (29 Jan 2022 08:00)  HR: 97 (29 Jan 2022 08:00) (91 - 102)  BP: --  BP(mean): 80 (29 Jan 2022 08:00) (66 - 82)  RR: 18 (29 Jan 2022 08:00) (18 - 18)  SpO2: 98% (29 Jan 2022 08:00) (97% - 98%)    GENERAL:  [x ]Alert  [ x]Oriented x 2 (person and place)  [ ]Lethargic  [x ]Cachexia  [ ]Unarousable  [x ]Verbal  [ ]Non-Verbal  Behavioral:   [ ]Anxiety  [ ]Delirium [ ]Agitation [ ]Other  HEENT:  [x ]Normal   [ ]Dry mouth   [ ]ET Tube/Trach  [ ]Oral lesions  PULMONARY:   [ ]Clear [ ]Tachypnea  [ ]Audible excessive secretions   [ ]Rhonchi        [ ]Right [ ]Left [ ]Bilateral  [ ]Crackles        [ ]Right [ ]Left [ ]Bilateral  [ ]Wheezing     [ ]Right [ ]Left [ ]Bilateral  [x ]Diminished BS [ ] Right [ ]Left [x ]Bilateral  CARDIOVASCULAR:    [x ]Regular [ ]Irregular [ ]Tachy  [ ]Jose [ ]Murmur [x ]Other: LVAD   GASTROINTESTINAL:  [ ]Soft  [ ]Distended   [x ]+BS  [ ]Non tender [x ]Tender over left flank. No Rebound or guarding  [ ]PEG [ ]OGT/ NGT   Last BM:  1/25  [x] Biliary drain   GENITOURINARY:  [x ]Normal [ ]Incontinent   [ ]Oliguria/Anuria   [ ]Landrum  MUSCULOSKELETAL:   [ ]Normal   [x ]Weakness  [ ]Bed/Wheelchair bound [ ]Edema  NEUROLOGIC:   [ ]No focal deficits  [ ] Cognitive impairment  [ ] Dysphagia [ ]Dysarthria [ ] Paresis [x ]Other:    SKIN:   [x ]Normal  [ ]Rash   [ ]Pressure ulcer(s) [ ]y [ ]n present on admission  Normal   CRITICAL CARE:  [ ]Shock Present  [ ]Septic [ ]Cardiogenic [ ]Neurologic [ ]Hypovolemic  [ ]Vasopressors [ ]Inotropes  [ ]Respiratory failure present [ ]Mechanical Ventilation [ ]Non-invasive ventilatory support [ ]High-Flow   [ ]Acute  [ ]Chronic [ ]Hypoxic  [ ]Hypercarbic [ ]Other  [ ]Other organ failure     LABS:                                            8.5    7.66  )-----------( 212      ( 29 Jan 2022 06:35 )             26.2   01-29    129<L>  |  96  |  15  ----------------------------<  75  4.1   |  22  |  0.98    Ca    9.7      29 Jan 2022 06:35  Phos  3.1     01-28  Mg     1.8     01-29              RADIOLOGY & ADDITIONAL STUDIES:  < from: CT Abdomen and Pelvis w/ IV Cont (01.19.22 @ 13:47) >  IMPRESSION:  *  Status post LVAD. No evidence of fluid collection adjacent to the LVAD.  *  Hypoenhancing lesion in the spleen measuring 4.0 cm, new compared to   prior examination 10/5/2021. In the appropriate clinical setting, splenic   abscess may be considered.        --- End of Report ---            MARY SUAREZ MD; Attending Radiologist  This document has been electronically signed. Jan 19 2022  3:00PM    < end of copied text >    Protein Calorie Malnutrition Present: [ ]mild [ ]moderate [ ]severe [ ]underweight [ ]morbid obesity  https://www.andeal.org/vault/0521/web/files/ONC/Table_Clinical%20Characteristics%20to%20Document%20Malnutrition-White%20JV%20et%20al%079732.pdf    Height (cm): 182.9 (12-13-21 @ 04:30), 165.1 (12-02-21 @ 13:55), 177.8 (10-18-21 @ 15:55)  Weight (kg): 52.6 (01-12-22 @ 12:50), 53.6 (12-02-21 @ 13:55), 61.6 (10-18-21 @ 15:55)  BMI (kg/m2): 15.7 (01-12-22 @ 12:50), 16 (12-13-21 @ 04:30), 19.7 (12-02-21 @ 13:55)    [ ]PPSV2 < or = 30%  [ ]significant weight loss [ ]poor nutritional intake [ ]anasarca    [ ]Artificial Nutrition    REFERRALS:   [ ]Chaplaincy  [ ]Hospice  [ ]Child Life  [ ]Social Work  [ ]Case management [ ]Holistic Therapy     Goals of Care Document:

## 2022-01-29 NOTE — PROGRESS NOTE ADULT - ASSESSMENT
65M PMH LVAD, recurrent sepsis, pneumonia, intubated/trach x2 cycles, chronic gall bladder disease s/p percutaneous cholecystectomy, SMA ischemia s/p stent, cachexia who presented from [facility] on 12/13 with septic shock 2/2 COVID (+) S/P pressor support now titrated off and downgraded to the floor. Hosp course c/b serratia bacteremia on IV Zosyn through 1/25, aspiration PNA, VT due to hypokalemia, and now CT finding of splenic abscess medically managing.

## 2022-01-30 NOTE — PROGRESS NOTE ADULT - NSPROGADDITIONALINFOA_GEN_ALL_CORE
Mark Sampson  Pager: [phone]  Off-Hours: [phone]  Available on MS Teams
Left message for Cristina
D/w sister 1/14 updated on full plan of care
Mark Sampson  Pager: [phone]  Off-Hours: [phone]  Available on MS Teams
D/W PATIENCE Cosme
D/W NP [place], Dr Oneill, Dr Burgos
Roberta sister 1/13 updated on full plan of care
D/W PATIENCE Rodriguez
D/w sister 1/14 updated on full plan of care
D/W NP Dr Nino Rodriguez, Dr Burgos
discussed with ACP
Yenny Webb   Hospitalist

## 2022-01-30 NOTE — PROGRESS NOTE ADULT - PROBLEM SELECTOR PLAN 11
Will need LVAD accepting facility when medically optimized- [facility]  Very poor prognosis- palliative following, plan to continue symptom support and ongoing GOC  Patient more compliant with medications since last palliative meeting on 1/26  Remains full code and does not want LVAD deactivated

## 2022-01-30 NOTE — PROGRESS NOTE ADULT - SUBJECTIVE AND OBJECTIVE BOX
Patient is a 66y old  Male who presents with a chief complaint of COVID (+) with AMS and hypotension (30 Jan 2022 03:24)    SUBJECTIVE / OVERNIGHT EVENTS: Patient seen and examined at bedside. No acute events overnight. Shakes his head when asked if he has any complaints. LDH in the 200s as requested to be drawn by heart failure    REVIEW OF SYSTEMS: 11 point ROS negative except as above    MEDICATIONS  (STANDING):  baclofen 5 milliGRAM(s) Oral every 8 hours  bisacodyl 5 milliGRAM(s) Oral every 12 hours  chlorhexidine 2% Cloths 1 Application(s) Topical <User Schedule>  cholecalciferol 1000 Unit(s) Oral daily  dextrose 50% Injectable 50 milliLiter(s) IV Push every 15 minutes  dextrose 50% Injectable 25 milliLiter(s) IV Push every 15 minutes  dronabinol 2.5 milliGRAM(s) Oral two times a day before meals  gabapentin Solution 125 milliGRAM(s) Oral three times a day  HYDROmorphone  Injectable 0.2 milliGRAM(s) IV Push every 8 hours  magnesium oxide 400 milliGRAM(s) Oral three times a day with meals  melatonin 3 milliGRAM(s) Oral at bedtime  methimazole 10 milliGRAM(s) Oral daily  metoclopramide Injectable 10 milliGRAM(s) IV Push four times a day  metoprolol succinate ER 25 milliGRAM(s) Oral daily  metoprolol succinate ER 50 milliGRAM(s) Oral at bedtime  mexiletine 150 milliGRAM(s) Oral every 8 hours  mirtazapine 7.5 milliGRAM(s) Oral at bedtime  multivitamin 1 Tablet(s) Oral daily  pantoprazole  Injectable 40 milliGRAM(s) IV Push two times a day  piperacillin/tazobactam IVPB.. 3.375 Gram(s) IV Intermittent every 8 hours  polyethylene glycol 3350 17 Gram(s) Oral daily  senna 2 Tablet(s) Oral at bedtime  sertraline 100 milliGRAM(s) Oral daily  sodium chloride 1 Gram(s) Oral every 8 hours  sodium chloride 0.9% lock flush 3 milliLiter(s) IV Push every 8 hours  spironolactone 25 milliGRAM(s) Oral daily  vancomycin    Solution 125 milliGRAM(s) Oral every 12 hours    MEDICATIONS  (PRN):  acetaminophen     Tablet .. 650 milliGRAM(s) Oral every 6 hours PRN Temp greater or equal to 38C (100.4F), Mild Pain (1 - 3), Moderate Pain (4 - 6)  HYDROmorphone  Injectable 0.2 milliGRAM(s) IV Push every 2 hours PRN Moderate to Severe pain      CAPILLARY BLOOD GLUCOSE        I&O's Summary    29 Jan 2022 07:01  -  30 Jan 2022 07:00  --------------------------------------------------------  IN: 1020 mL / OUT: 1825 mL / NET: -805 mL    30 Jan 2022 07:01  -  30 Jan 2022 12:27  --------------------------------------------------------  IN: 240 mL / OUT: 250 mL / NET: -10 mL        PHYSICAL EXAM:  Vital Signs Last 24 Hrs  T(C): 36.3 (30 Jan 2022 10:59), Max: 36.7 (29 Jan 2022 16:00)  T(F): 97.4 (30 Jan 2022 10:59), Max: 98 (29 Jan 2022 16:00)  HR: 96 (30 Jan 2022 10:59) (79 - 115)  BP: --  BP(mean): 76 (30 Jan 2022 10:59) (70 - 82)  RR: 18 (30 Jan 2022 10:59) (17 - 18)  SpO2: 98% (30 Jan 2022 10:59) (96% - 99%)    GEN: male in NAD, appears comfortable, no diaphoresis  EYES: No scleral injection, PERRL, EOMI  ENTM: neck supple & symmetric without tracheal deviation, moist membranes, no gross hearing impairment, thyroid gland not enlarged  CV: +S1/S2, no m/r/g, no abdominal bruit, no LE edema; +LVAD  RESP: breathing comfortably, no respiratory accessory muscle use, CTAB, no w/r/r  GI: normoactive BS, soft, NTND, no rebounding/guarding, no palpable masses; +cholecystostomy appears in place and draining    LABS:                        8.6    7.01  )-----------( 200      ( 30 Jan 2022 06:16 )             27.1     01-30    128<L>  |  94<L>  |  13  ----------------------------<  79  3.9   |  23  |  0.84    Ca    9.8      30 Jan 2022 06:16  Mg     1.8     01-29    TPro  8.9<H>  /  Alb  3.1<L>  /  TBili  0.3  /  DBili  x   /  AST  18  /  ALT  11  /  AlkPhos  146<H>  01-30                RADIOLOGY & ADDITIONAL TESTS:  Results Reviewed:   Imaging Personally Reviewed:  Electrocardiogram Personally Reviewed:    COORDINATION OF CARE:  Care Discussed with Consultants/Other Providers [Y/N]:  Prior or Outpatient Records Reviewed [Y/N]:

## 2022-01-30 NOTE — PROGRESS NOTE ADULT - ASSESSMENT
65M PMH LVAD, recurrent sepsis, pneumonia, intubated/trach x2 cycles, chronic gall bladder disease s/p percutaneous cholecystectomy, SMA ischemia s/p stent, cachexia who presented from [facility] on 12/13 with septic shock 2/2 COVID (+) S/P pressor support now titrated off and downgraded to the floor. Hosp course c/b serratia bacteremia on IV Zosyn through 1/25, aspiration PNA, VT due to hypokalemia, and now CT finding of splenic abscess medically managing.

## 2022-01-31 NOTE — PROGRESS NOTE ADULT - PROBLEM SELECTOR PLAN 2
? 2/2 to splenic abscess + constipation.   Abx as per ID   Senna 2 tabs hs, Miralax qd, and will add Dulcolax 5mg q 12 hours.   If symptoms are recurrent then consider f/u imaging studies. ? 2/2 to splenic abscess + constipation.   Abx as per ID   Senna 2 tabs hs, Miralax qd, and Dulcolax 5mg q 12 hours.   If symptoms are recurrent then consider f/u imaging studies.

## 2022-01-31 NOTE — PROGRESS NOTE ADULT - SUBJECTIVE AND OBJECTIVE BOX
Behavioral Cardiology Progress Note     History of present illness: Mr. Quispe is a 66 year-old man with history of NICM s/p HM2 LVAD (9/2017) as DT (due to severe PAD) with TV ring, multiple GI bleeds, thus maintained off AC, CKD 3prior COVID-19 infection in 4/2020, recurrent syncope post LVAD s/p ILR, s/p prolonged complex hospitalization from 6/14-11/16/2021 initially admitted with abdominal pain complicated by GIB, respiratory failure s/p trach, multiple infections, s/p cholecystotomy tube and SMA stent 10/2021, ultimately discharged to [facility] and then returned to [facility] for trach decannulation 12/2 who now presents with recurrent COVID-19 infection, initially in distributive shock. 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 His main issue at this time is his malnutrition for which supplements have been added to his diet.     Social history: , lives in his sister’s house in [place]. Has 3 sons (2 live in , 1 in [facility]). Oldest son (Kang Quispe) lives in [place]. Not close with his children. One of 3 siblings. Lives in his sister’s house in [place] (Cristina Rudolph [phone]), also in the home are niece (C[facility], [facility] [phone]) and nephew (Miller Rudolph).  Another sister lives close by in [place] and all other siblings live in [facility] which the family visit often. Prior to health issues, worked full time at [employer] in [place], stopped working due to heart disease. Education: high school graduate.   Substance use:   ·	Tobacco: Denies. Former smoker, 8-10 cigarettes/day for 30 years, quit prior to LVAD 2017.  ·	Alcohol: Past use of 3-4 drinks of Elmore 2x/week. None since health problems began.   ·	Drug: Denies any drug use.

## 2022-01-31 NOTE — PROGRESS NOTE ADULT - ASSESSMENT
65M PMH LVAD, recurrent sepsis, pneumonia, intubated/trach x2 cycles, chronic gall bladder disease s/p percutaneous cholecystectomy, SMA ischemia s/p stent, cachexia who presented from [facility] on 12/13 with septic shock 2/2 COVID (+) S/P pressor support now titrated off and downgraded to the floor. Hosp course c/b serratia bacteremia on IV Zosyn through 1/25, aspiration PNA, VT due to hypokalemia, and now CT finding of splenic abscess medically managing.

## 2022-01-31 NOTE — PROGRESS NOTE ADULT - PROBLEM SELECTOR PLAN 7
Will continue to f/u for Symptoms.    My inputs were d/w Dr. Son ()           Francisco Burgos MD   Geriatrics and Palliative Care (GAP) Consult Service    of Geriatric and Palliative Medicine  [facility]      Please page the following number for clinical matters between the hours of 9 am and 5 pm from Monday through Friday : [phone]    After 5pm and on weekends, please see the contact information below:    In the event of newly developing, evolving, or worsening symptoms, please contact the Palliative Medicine team via pager (if the patient is at [facility] #[phone] or if the patient is at [facility] #[phone]) The Geriatric and Palliative Medicine service has coverage 24 hours a day/ 7 days a week to provide medical recommendations regarding symptom management needs via telephone Seen by Mari on 1/27 with inputs: "Pt didn't share much verbally but nodded and in agreement of  sitting down with him. He maintained eye contact and shared that he is Hindu and wants prayer.  provided an extemporaneous prayer. Pt expressed appreciation for pastoral presence by reaching out his hand and reading 's card while nodding. Pt appeared relieved and said thank you with in a smile.   Pt benefited from compassionate presence. Chaplaincy care will continue to follow pt for support."    D/C plan is for [facility].     At this point, I will sign off.     Pleaser call me back if needing help with symptoms Rx or if there is a need to re-address GOC.       Francisco Burgos MD   Geriatrics and Palliative Care (GAP) Consult Service    of Geriatric and Palliative Medicine  [facility]      In the event of newly developing, evolving, or worsening symptoms, please contact the Palliative Medicine team via pager (if the patient is at [facility] #[phone] or if the patient is at [facility] #[phone]) The Geriatric and Palliative Medicine service has coverage 24 hours a day/ 7 days a week to provide medical recommendations regarding symptom management needs via telephone

## 2022-01-31 NOTE — PROGRESS NOTE ADULT - SUBJECTIVE AND OBJECTIVE BOX
Subjective: Patient seen and examined resting in bed.     Medications:  acetaminophen     Tablet .. 650 milliGRAM(s) Oral every 6 hours PRN  baclofen 5 milliGRAM(s) Oral every 8 hours  bisacodyl 5 milliGRAM(s) Oral every 12 hours  chlorhexidine 2% Cloths 1 Application(s) Topical <User Schedule>  cholecalciferol 1000 Unit(s) Oral daily  dextrose 50% Injectable 50 milliLiter(s) IV Push every 15 minutes  dextrose 50% Injectable 25 milliLiter(s) IV Push every 15 minutes  dronabinol 2.5 milliGRAM(s) Oral two times a day before meals  gabapentin Solution 125 milliGRAM(s) Oral three times a day  HYDROmorphone  Injectable 0.2 milliGRAM(s) IV Push every 8 hours  HYDROmorphone  Injectable 0.2 milliGRAM(s) IV Push every 2 hours PRN  magnesium oxide 400 milliGRAM(s) Oral three times a day with meals  melatonin 3 milliGRAM(s) Oral at bedtime  methimazole 10 milliGRAM(s) Oral daily  metoclopramide Injectable 10 milliGRAM(s) IV Push four times a day  metoprolol succinate ER 50 milliGRAM(s) Oral at bedtime  metoprolol succinate ER 25 milliGRAM(s) Oral daily  mexiletine 150 milliGRAM(s) Oral every 8 hours  mirtazapine 7.5 milliGRAM(s) Oral at bedtime  multivitamin 1 Tablet(s) Oral daily  pantoprazole    Tablet 40 milliGRAM(s) Oral every 12 hours  piperacillin/tazobactam IVPB.. 3.375 Gram(s) IV Intermittent every 8 hours  polyethylene glycol 3350 17 Gram(s) Oral daily  senna 2 Tablet(s) Oral at bedtime  sertraline 100 milliGRAM(s) Oral daily  sodium chloride 1 Gram(s) Oral every 8 hours  sodium chloride 0.9% lock flush 3 milliLiter(s) IV Push every 8 hours  spironolactone 25 milliGRAM(s) Oral daily  vancomycin    Solution 125 milliGRAM(s) Oral every 12 hours    Vitals:  Vital Signs Last 24 Hours  T(C): 36.4 (01-31-22 @ 04:20), Max: 36.7 (01-30-22 @ 20:13)  HR: 93 (01-31-22 @ 04:20) (73 - 96)  BP: --  RR: 18 (01-31-22 @ 04:20) (17 - 18)  SpO2: 98% (01-31-22 @ 04:20) (97% - 98%)        I&O's Summary    30 Jan 2022 07:01  -  31 Jan 2022 07:00  --------------------------------------------------------  IN: 960 mL / OUT: 1120 mL / NET: -160 mL        Physical Exam  General: No distress. Comfortable.  HEENT: EOM intact.  Neck: Neck supple. JVP not elevated. No masses  Chest: Clear to auscultation bilaterally  CV: +VAD hum  Abdomen: Soft, non-distended, some tenderness noted in RUQ and epigastric region upon palpation. +bilary drain   Neurology: Alert and oriented    LVAD Interrogation  VAD TYPE HM 2  Speed 9200  Flow 4.6  Power 5.2  PI  5.2  Assessment of driveline exit site: driveline exit site c/d/i  Programming changes: no changes made    Labs:                        8.7    6.18  )-----------( 227      ( 31 Jan 2022 06:10 )             27.2     01-31    131<L>  |  96  |  13  ----------------------------<  71  4.1   |  25  |  0.96    Ca    9.6      31 Jan 2022 06:10    TPro  8.9<H>  /  Alb  3.1<L>  /  TBili  0.3  /  DBili  x   /  AST  18  /  ALT  11  /  AlkPhos  146<H>  01-30              Lactate Dehydrogenase, Serum: 227 U/L (01-30 @ 06:16)

## 2022-01-31 NOTE — CONSULT NOTE ADULT - PROVIDER SPECIALTY LIST ADULT
Vascular Surgery
Palliative Care
Electrophysiology
Intervent Radiology
Electrophysiology
Heart Failure
Infectious Disease
Podiatry
Psychology

## 2022-01-31 NOTE — PROGRESS NOTE ADULT - PROBLEM SELECTOR PLAN 7
- continues to have poor appetite and abdominal pain   - nutrition following   - undergoing calorie count, ends 1/28 - continues to have poor appetite and abdominal pain   - nutrition following   - failed previous calorie count, please encourage patient to increase his PO intake

## 2022-01-31 NOTE — PROGRESS NOTE ADULT - PROBLEM SELECTOR PLAN 1
Though this symptom has improved, it is still recurrent and impairing his capacity to eat.   Reglan 10mg IV q 6ATC and if continuing to be stable, can try to switch to Reglan 10mg PO q8 hours.   Continue Baclofen 5mg PO q 8ATC   Gabapentin to 125mg PO tid Improved.   Reglan 10mg IV q 6ATC and if continuing to be stable and getting close to DC, can switch to Reglan 10mg PO q8 hours.   Continue Baclofen 5mg PO q 8ATC   Gabapentin to 125mg PO tid  I advise the patient to take meds as prescribed in order to avoid recurrent symptoms and he stated he was going to do so.

## 2022-01-31 NOTE — PROGRESS NOTE ADULT - ASSESSMENT
67 y/o M with Stage D NICM s/p HM2 LVAD in 9/2017 at  (due to severe PAD) with TV ring, multiple GI bleeds, thus maintained off AC, CKD 3prior COVID-19 infection in 4/2020, recurrent syncope post LVAD s/p ILR, s/p prolonged complex hospitalization from 6/14-11/16/2021 initially admitted with abdominal pain complicated by GIB, respiratory failure s/p trach, multiple infections, s/p cholecystotomy tube and SMA stent 10/2021, ultimately discharged to [facility] rehab and then returned to [facility] for trach decannulation 12/2 who now presents with recurrent COVID-19 infection, initially in distributive shock. 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 Though from the COVID and respiratory point of view, his condition has improved, he now has a decline in his appetite, is refusing to take certain medications and is noted to have worsening abdominal pain with new CT findings suggesting Splenic Abscess. He was seen by IR that based on negative blood Cx and clinical stability is hesitant to IR drainage.

## 2022-01-31 NOTE — PROGRESS NOTE ADULT - SUBJECTIVE AND OBJECTIVE BOX
Mich Krueger MD  Pager [phone]  [facility] [phone]  Cell Phone [phone]    Patient is a 66y old  Male who presents with a chief complaint of COVID (+) with AMS and hypotension (31 Jan 2022 08:52)        SUBJECTIVE / OVERNIGHT EVENTS: Patient c/o L sided abd pain. Requesting grapes.  TELEMETRY: SR/ST       MEDICATIONS  (STANDING):  baclofen 5 milliGRAM(s) Oral every 8 hours  bisacodyl 5 milliGRAM(s) Oral every 12 hours  chlorhexidine 2% Cloths 1 Application(s) Topical <User Schedule>  cholecalciferol 1000 Unit(s) Oral daily  dextrose 50% Injectable 25 milliLiter(s) IV Push every 15 minutes  dextrose 50% Injectable 50 milliLiter(s) IV Push every 15 minutes  dronabinol 2.5 milliGRAM(s) Oral two times a day before meals  gabapentin Solution 125 milliGRAM(s) Oral three times a day  HYDROmorphone  Injectable 0.2 milliGRAM(s) IV Push every 8 hours  magnesium oxide 400 milliGRAM(s) Oral three times a day with meals  melatonin 3 milliGRAM(s) Oral at bedtime  methimazole 10 milliGRAM(s) Oral daily  metoclopramide Injectable 10 milliGRAM(s) IV Push four times a day  metoprolol succinate ER 25 milliGRAM(s) Oral daily  metoprolol succinate ER 50 milliGRAM(s) Oral at bedtime  mexiletine 150 milliGRAM(s) Oral every 8 hours  mirtazapine 7.5 milliGRAM(s) Oral at bedtime  multivitamin 1 Tablet(s) Oral daily  pantoprazole    Tablet 40 milliGRAM(s) Oral every 12 hours  piperacillin/tazobactam IVPB.. 3.375 Gram(s) IV Intermittent every 8 hours  polyethylene glycol 3350 17 Gram(s) Oral daily  senna 2 Tablet(s) Oral at bedtime  sertraline 100 milliGRAM(s) Oral daily  sodium chloride 1 Gram(s) Oral every 8 hours  sodium chloride 0.9% lock flush 3 milliLiter(s) IV Push every 8 hours  spironolactone 25 milliGRAM(s) Oral daily  vancomycin    Solution 125 milliGRAM(s) Oral every 12 hours    MEDICATIONS  (PRN):  acetaminophen     Tablet .. 650 milliGRAM(s) Oral every 6 hours PRN Temp greater or equal to 38C (100.4F), Mild Pain (1 - 3), Moderate Pain (4 - 6)  HYDROmorphone  Injectable 0.2 milliGRAM(s) IV Push every 2 hours PRN Moderate to Severe pain      Vital Signs Last 24 Hrs  T(C): 36.4 (31 Jan 2022 08:00), Max: 36.7 (30 Jan 2022 20:13)  T(F): 97.6 (31 Jan 2022 08:00), Max: 98 (30 Jan 2022 20:13)  HR: 80 (31 Jan 2022 08:00) (73 - 96)  BP: --  BP(mean): 82 (31 Jan 2022 08:00) (80 - 82)  RR: 18 (31 Jan 2022 08:00) (17 - 18)  SpO2: 98% (31 Jan 2022 08:00) (97% - 98%)  CAPILLARY BLOOD GLUCOSE        I&O's Summary    30 Jan 2022 07:01  -  31 Jan 2022 07:00  --------------------------------------------------------  IN: 960 mL / OUT: 1120 mL / NET: -160 mL          PHYSICAL EXAM  GENERAL: NAD, cachetic  HEAD:  Atraumatic, normocephalic  EYES: EOMI, PERRLA, conjunctiva and sclera clear  CHEST/LUNG: Clear to auscultation bilaterally; no wheezes  HEART: + LVAD hum  ABDOMEN: Soft, tender to palpation in LUQ, nondistended; bowel sounds present; +R sided driveline; +R sided cholecystostomy tube with biliary drainage  EXTREMITIES:  2+ peripheral pulses; no clubbing, cyanosis, or edema  PSYCH: AAOx3      LABS:                        8.7    6.18  )-----------( 227      ( 31 Jan 2022 06:10 )             27.2     01-31    131<L>  |  96  |  13  ----------------------------<  71  4.1   |  25  |  0.96    Ca    9.6      31 Jan 2022 06:10    TPro  8.9<H>  /  Alb  3.1<L>  /  TBili  0.3  /  DBili  x   /  AST  18  /  ALT  11  /  AlkPhos  146<H>  01-30                RADIOLOGY & ADDITIONAL TESTS:    Imaging Personally Reviewed:  Consultant(s) Notes Reviewed:    Care Discussed with Consultants/Other Providers:

## 2022-01-31 NOTE — PROGRESS NOTE ADULT - SUBJECTIVE AND OBJECTIVE BOX
Enrike Creole  ID #      HPI: 67 y/o M with Stage D NICM s/p HM2 LVAD in 9/2017 at  (due to severe PAD) with TV ring, multiple GI bleeds, thus maintained off AC, CKD 3prior COVID-19 infection in 4/2020, recurrent syncope post LVAD s/p ILR, s/p prolonged complex hospitalization from 6/14-11/16/2021 initially admitted with abdominal pain complicated by GIB, respiratory failure s/p trach, multiple infections, s/p cholecystotomy tube and SMA stent 10/2021, ultimately discharged to [facility] rehab and then returned to [facility] for trach decannulation 12/2 who now presents with recurrent COVID-19 infection, initially in distributive shock. 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 Though from the COVID and respiratory point of view, his condition has improved, he now has a decline in his appetite, is refusing to take certain medications and is noted to have worsening abdominal pain with new CT findings suggesting Splenic Abscess. He was seen by IR that based on negative blood Cx and clinical stability is hesitant to IR drainage.      SUBJECTIVE AND OBJECTIVE:      INTERVAL HPI/OVERNIGHT EVENTS: No new events.     DNR on chart:   Allergies    Amiodarone Hydrochloride (Other (Severe))    Intolerances    MEDICATIONS  (STANDING):  baclofen 5 milliGRAM(s) Oral every 8 hours  bisacodyl 5 milliGRAM(s) Oral every 12 hours  chlorhexidine 2% Cloths 1 Application(s) Topical <User Schedule>  cholecalciferol 1000 Unit(s) Oral daily  dextrose 50% Injectable 25 milliLiter(s) IV Push every 15 minutes  dextrose 50% Injectable 50 milliLiter(s) IV Push every 15 minutes  dronabinol 2.5 milliGRAM(s) Oral two times a day before meals  gabapentin Solution 125 milliGRAM(s) Oral three times a day  HYDROmorphone  Injectable 0.2 milliGRAM(s) IV Push every 8 hours  magnesium oxide 400 milliGRAM(s) Oral three times a day with meals  melatonin 3 milliGRAM(s) Oral at bedtime  methimazole 10 milliGRAM(s) Oral daily  metoclopramide Injectable 10 milliGRAM(s) IV Push four times a day  metoprolol succinate ER 50 milliGRAM(s) Oral at bedtime  metoprolol succinate ER 25 milliGRAM(s) Oral daily  mexiletine 150 milliGRAM(s) Oral every 8 hours  mirtazapine 7.5 milliGRAM(s) Oral at bedtime  multivitamin 1 Tablet(s) Oral daily  pantoprazole    Tablet 40 milliGRAM(s) Oral every 12 hours  piperacillin/tazobactam IVPB.. 3.375 Gram(s) IV Intermittent every 8 hours  polyethylene glycol 3350 17 Gram(s) Oral daily  senna 2 Tablet(s) Oral at bedtime  sertraline 100 milliGRAM(s) Oral daily  sodium chloride 1 Gram(s) Oral every 8 hours  sodium chloride 0.9% lock flush 3 milliLiter(s) IV Push every 8 hours  spironolactone 25 milliGRAM(s) Oral daily  vancomycin    Solution 125 milliGRAM(s) Oral every 12 hours    MEDICATIONS  (PRN):  acetaminophen     Tablet .. 650 milliGRAM(s) Oral every 6 hours PRN Temp greater or equal to 38C (100.4F), Mild Pain (1 - 3), Moderate Pain (4 - 6)  HYDROmorphone  Injectable 0.2 milliGRAM(s) IV Push every 2 hours PRN Moderate to Severe pain        ITEMS UNCHECKED ARE NOT PRESENT    PRESENT SYMPTOMS: [ ]Unable to obtain due to poor mentation   Source if other than patient:  [ ]Family   [ ]Team     Pain:  [ x]yes [ ]no  QOL impact - Not feeling comfortable.   Location -                  Left flank/lower qudrant    Aggravating factors - palpation   Quality - not able to indicate  Radiation - none   Timing- Intermittent   Severity (0-10 scale): Moderate   Minimal acceptable level (0-10 scale): Mild to moderate      Dyspnea:                           [ ]Mild [ ]Moderate [ ]Severe  Anxiety:                             [ ]Mild [ ]Moderate [ ]Severe  Fatigue:                             [ ]Mild [ ]Moderate [ ]Severe  Nausea:                             [ ]Mild [ ]Moderate [ ]Severe  Loss of appetite:              [x ]Mild [ ]Moderate [ ]Severe  Constipation:                    [ ]Mild [ ]Moderate [ ]Severe    CPOT:    https://www.[facility].org/getattachment/[email or web address]/Critical-Care-Pain-Observation-Tool-(CPOT)    PAIN AD Score:	  http://geriatrictoolkit.[email or web address]/cog/painad.pdf (Ctrl + left click to view)    Other Symptoms:  [x ]All other review of systems negative but as per subjective above.     Palliative Performance Status Version 2:   40      %      http://[email or web address].org/files/news/palliative_performance_scale_ppsv2.pdf  PHYSICAL EXAM:  Vital Signs Last 24 Hrs  T(C): 36.8 (31 Jan 2022 20:40), Max: 36.8 (31 Jan 2022 20:40)  T(F): 98.2 (31 Jan 2022 20:40), Max: 98.2 (31 Jan 2022 20:40)  HR: 97 (31 Jan 2022 20:40) (73 - 97)  BP: --  BP(mean): 72 (31 Jan 2022 20:10) (72 - 82)  RR: 18 (31 Jan 2022 20:40) (18 - 18)  SpO2: 97% (31 Jan 2022 20:40) (97% - 98%)    GENERAL:  [x ]Alert  [ x]Oriented x 2 (person and place)  [ ]Lethargic  [x ]Cachexia  [ ]Unarousable  [x ]Verbal  [ ]Non-Verbal  Behavioral:   [ ]Anxiety  [ ]Delirium [ ]Agitation [ ]Other  HEENT:  [x ]Normal   [ ]Dry mouth   [ ]ET Tube/Trach  [ ]Oral lesions  PULMONARY:   [ ]Clear [ ]Tachypnea  [ ]Audible excessive secretions   [ ]Rhonchi        [ ]Right [ ]Left [ ]Bilateral  [ ]Crackles        [ ]Right [ ]Left [ ]Bilateral  [ ]Wheezing     [ ]Right [ ]Left [ ]Bilateral  [x ]Diminished BS [ ] Right [ ]Left [x ]Bilateral  CARDIOVASCULAR:    [x ]Regular [ ]Irregular [ ]Tachy  [ ]Jose [ ]Murmur [x ]Other: LVAD   GASTROINTESTINAL:  [ ]Soft  [ ]Distended   [x ]+BS  [ ]Non tender [x ]Tender over left flank. No Rebound or guarding  [ ]PEG [ ]OGT/ NGT   Last BM:  1/25  [x] Biliary drain   GENITOURINARY:  [x ]Normal [ ]Incontinent   [ ]Oliguria/Anuria   [ ]Landrum  MUSCULOSKELETAL:   [ ]Normal   [x ]Weakness  [ ]Bed/Wheelchair bound [ ]Edema  NEUROLOGIC:   [ ]No focal deficits  [ ] Cognitive impairment  [ ] Dysphagia [ ]Dysarthria [ ] Paresis [x ]Other:    SKIN:   [x ]Normal  [ ]Rash   [ ]Pressure ulcer(s) [ ]y [ ]n present on admission  Normal   CRITICAL CARE:  [ ]Shock Present  [ ]Septic [ ]Cardiogenic [ ]Neurologic [ ]Hypovolemic  [ ]Vasopressors [ ]Inotropes  [ ]Respiratory failure present [ ]Mechanical Ventilation [ ]Non-invasive ventilatory support [ ]High-Flow   [ ]Acute  [ ]Chronic [ ]Hypoxic  [ ]Hypercarbic [ ]Other  [ ]Other organ failure     LABS:                                   8.7    6.18  )-----------( 227      ( 31 Jan 2022 06:10 )             27.2   01-31    131<L>  |  96  |  13  ----------------------------<  71  4.1   |  25  |  0.96    Ca    9.6      31 Jan 2022 06:10    TPro  8.9<H>  /  Alb  3.1<L>  /  TBili  0.3  /  DBili  x   /  AST  18  /  ALT  11  /  AlkPhos  146<H>  01-30              RADIOLOGY & ADDITIONAL STUDIES:  < from: CT Abdomen and Pelvis w/ IV Cont (01.19.22 @ 13:47) >  IMPRESSION:  *  Status post LVAD. No evidence of fluid collection adjacent to the LVAD.  *  Hypoenhancing lesion in the spleen measuring 4.0 cm, new compared to   prior examination 10/5/2021. In the appropriate clinical setting, splenic   abscess may be considered.        --- End of Report ---            MARY SUAREZ MD; Attending Radiologist  This document has been electronically signed. Jan 19 2022  3:00PM    < end of copied text >    Protein Calorie Malnutrition Present: [ ]mild [ ]moderate [ ]severe [ ]underweight [ ]morbid obesity  https://www.andeal.org/vault/2440/web/files/ONC/Table_Clinical%20Characteristics%20to%20Document%20Malnutrition-White%20JV%20et%20al%202012.pdf    Height (cm): 182.9 (12-13-21 @ 04:30), 165.1 (12-02-21 @ 13:55), 177.8 (10-18-21 @ 15:55)  Weight (kg): 52.6 (01-12-22 @ 12:50), 53.6 (12-02-21 @ 13:55), 61.6 (10-18-21 @ 15:55)  BMI (kg/m2): 15.7 (01-12-22 @ 12:50), 16 (12-13-21 @ 04:30), 19.7 (12-02-21 @ 13:55)    [ ]PPSV2 < or = 30%  [ ]significant weight loss [ ]poor nutritional intake [ ]anasarca    [ ]Artificial Nutrition    REFERRALS:   [ ]Chaplaincy  [ ]Hospice  [ ]Child Life  [ ]Social Work  [ ]Case management [ ]Holistic Therapy     Goals of Care Document:     Ch[place] Creole  was used through language [employer].     HPI: 65 y/o M with Stage D NICM s/p HM2 LVAD in 9/2017 at  (due to severe PAD) with TV ring, multiple GI bleeds, thus maintained off AC, CKD 3prior COVID-19 infection in 4/2020, recurrent syncope post LVAD s/p ILR, s/p prolonged complex hospitalization from 6/14-11/16/2021 initially admitted with abdominal pain complicated by GIB, respiratory failure s/p trach, multiple infections, s/p cholecystotomy tube and SMA stent 10/2021, ultimately discharged to [facility] rehab and then returned to [facility] for trach decannulation 12/2 who now presents with recurrent COVID-19 infection, initially in distributive shock. 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 Though from the COVID and respiratory point of view, his condition has improved, he now has a decline in his appetite, is refusing to take certain medications and is noted to have worsening abdominal pain with new CT findings suggesting Splenic Abscess. He was seen by IR that based on negative blood Cx and clinical stability is hesitant to IR drainage.      SUBJECTIVE AND OBJECTIVE: Most of the patient's symptoms have improved (had a BM, did not have hiccups during my entire visit, and was not c/o pain). However, he still has poor appetite. When asked about the why he is not eating, he answered "because I can only eat small amounts of food." He denied any other complaints.      INTERVAL HPI/OVERNIGHT EVENTS: No new events.     DNR on chart:   Allergies    Amiodarone Hydrochloride (Other (Severe))    Intolerances    MEDICATIONS  (STANDING):  baclofen 5 milliGRAM(s) Oral every 8 hours  bisacodyl 5 milliGRAM(s) Oral every 12 hours  chlorhexidine 2% Cloths 1 Application(s) Topical <User Schedule>  cholecalciferol 1000 Unit(s) Oral daily  dextrose 50% Injectable 25 milliLiter(s) IV Push every 15 minutes  dextrose 50% Injectable 50 milliLiter(s) IV Push every 15 minutes  dronabinol 2.5 milliGRAM(s) Oral two times a day before meals  gabapentin Solution 125 milliGRAM(s) Oral three times a day  HYDROmorphone  Injectable 0.2 milliGRAM(s) IV Push every 8 hours  magnesium oxide 400 milliGRAM(s) Oral three times a day with meals  melatonin 3 milliGRAM(s) Oral at bedtime  methimazole 10 milliGRAM(s) Oral daily  metoclopramide Injectable 10 milliGRAM(s) IV Push four times a day  metoprolol succinate ER 50 milliGRAM(s) Oral at bedtime  metoprolol succinate ER 25 milliGRAM(s) Oral daily  mexiletine 150 milliGRAM(s) Oral every 8 hours  mirtazapine 7.5 milliGRAM(s) Oral at bedtime  multivitamin 1 Tablet(s) Oral daily  pantoprazole    Tablet 40 milliGRAM(s) Oral every 12 hours  piperacillin/tazobactam IVPB.. 3.375 Gram(s) IV Intermittent every 8 hours  polyethylene glycol 3350 17 Gram(s) Oral daily  senna 2 Tablet(s) Oral at bedtime  sertraline 100 milliGRAM(s) Oral daily  sodium chloride 1 Gram(s) Oral every 8 hours  sodium chloride 0.9% lock flush 3 milliLiter(s) IV Push every 8 hours  spironolactone 25 milliGRAM(s) Oral daily  vancomycin    Solution 125 milliGRAM(s) Oral every 12 hours    MEDICATIONS  (PRN):  acetaminophen     Tablet .. 650 milliGRAM(s) Oral every 6 hours PRN Temp greater or equal to 38C (100.4F), Mild Pain (1 - 3), Moderate Pain (4 - 6)  HYDROmorphone  Injectable 0.2 milliGRAM(s) IV Push every 2 hours PRN Moderate to Severe pain        ITEMS UNCHECKED ARE NOT PRESENT    PRESENT SYMPTOMS: [ ]Unable to obtain due to poor mentation   Source if other than patient:  [ ]Family   [ ]Team     Pain:  [ x]yes [ ]no  QOL impact - Not feeling comfortable.   Location -                  Left flank/lower qudrant    Aggravating factors - palpation   Quality - not able to indicate  Radiation - none   Timing- Intermittent   Severity (0-10 scale): Moderate. However, at the time of my eval, he denied pain   Minimal acceptable level (0-10 scale): Mild to moderate      Dyspnea:                           [ ]Mild [ ]Moderate [ ]Severe  Anxiety:                             [ ]Mild [ ]Moderate [ ]Severe  Fatigue:                             [ ]Mild [ ]Moderate [ ]Severe  Nausea:                             [ ]Mild [ ]Moderate [ ]Severe  Loss of appetite:              [x ]Mild [ ]Moderate [ ]Severe  Constipation:                    [ ]Mild [ ]Moderate [ ]Severe    CPOT:    https://www.[facility].org/getattachment/[email or web address]/Critical-Care-Pain-Observation-Tool-(CPOT)    PAIN AD Score:	  http://geriatrictoolkit.[email or web address]/cog/painad.pdf (Ctrl + left click to view)    Other Symptoms:  [x ]All other review of systems negative but as per subjective above.     Palliative Performance Status Version 2:   40      %      http://[facility]pcrc.org/files/news/palliative_performance_scale_ppsv2.pdf  PHYSICAL EXAM:  Vital Signs Last 24 Hrs  T(C): 36.8 (31 Jan 2022 20:40), Max: 36.8 (31 Jan 2022 20:40)  T(F): 98.2 (31 Jan 2022 20:40), Max: 98.2 (31 Jan 2022 20:40)  HR: 97 (31 Jan 2022 20:40) (73 - 97)  BP: --  BP(mean): 72 (31 Jan 2022 20:10) (72 - 82)  RR: 18 (31 Jan 2022 20:40) (18 - 18)  SpO2: 97% (31 Jan 2022 20:40) (97% - 98%)    GENERAL:  [x ]Alert  [ x]Oriented x 2 (person and place)  [ ]Lethargic  [x ]Cachexia  [ ]Unarousable  [x ]Verbal  [ ]Non-Verbal  Behavioral:   [ ]Anxiety  [ ]Delirium [ ]Agitation [ ]Other  HEENT:  [x ]Normal   [ ]Dry mouth   [ ]ET Tube/Trach  [ ]Oral lesions  PULMONARY:   [ ]Clear [ ]Tachypnea  [ ]Audible excessive secretions   [ ]Rhonchi        [ ]Right [ ]Left [ ]Bilateral  [ ]Crackles        [ ]Right [ ]Left [ ]Bilateral  [ ]Wheezing     [ ]Right [ ]Left [ ]Bilateral  [x ]Diminished BS [ ] Right [ ]Left [x ]Bilateral  CARDIOVASCULAR:    [x ]Regular [ ]Irregular [ ]Tachy  [ ]Jose [ ]Murmur [x ]Other: LVAD   GASTROINTESTINAL:  [ ]Soft  [ ]Distended   [x ]+BS  [ ]Non tender [x ]Tender over left flank. No Rebound or guarding  [ ]PEG [ ]OGT/ NGT   Last BM:  1/31  [x] Biliary drain   GENITOURINARY:  [x ]Normal [ ]Incontinent   [ ]Oliguria/Anuria   [ ]Landrum  MUSCULOSKELETAL:   [ ]Normal   [x ]Weakness  [ ]Bed/Wheelchair bound [ ]Edema  NEUROLOGIC:   [ ]No focal deficits  [ ] Cognitive impairment  [ ] Dysphagia [ ]Dysarthria [ ] Paresis [x ]Other:    SKIN:   [x ]Normal  [ ]Rash   [ ]Pressure ulcer(s) [ ]y [ ]n present on admission  Normal   CRITICAL CARE:  [ ]Shock Present  [ ]Septic [ ]Cardiogenic [ ]Neurologic [ ]Hypovolemic  [ ]Vasopressors [ ]Inotropes  [ ]Respiratory failure present [ ]Mechanical Ventilation [ ]Non-invasive ventilatory support [ ]High-Flow   [ ]Acute  [ ]Chronic [ ]Hypoxic  [ ]Hypercarbic [ ]Other  [ ]Other organ failure     LABS:                                   8.7    6.18  )-----------( 227      ( 31 Jan 2022 06:10 )             27.2   01-31    131<L>  |  96  |  13  ----------------------------<  71  4.1   |  25  |  0.96    Ca    9.6      31 Jan 2022 06:10    TPro  8.9<H>  /  Alb  3.1<L>  /  TBili  0.3  /  DBili  x   /  AST  18  /  ALT  11  /  AlkPhos  146<H>  01-30              RADIOLOGY & ADDITIONAL STUDIES:  < from: CT Abdomen and Pelvis w/ IV Cont (01.19.22 @ 13:47) >  IMPRESSION:  *  Status post LVAD. No evidence of fluid collection adjacent to the LVAD.  *  Hypoenhancing lesion in the spleen measuring 4.0 cm, new compared to   prior examination 10/5/2021. In the appropriate clinical setting, splenic   abscess may be considered.        --- End of Report ---            MARY SUAREZ MD; Attending Radiologist  This document has been electronically signed. Jan 19 2022  3:00PM    < end of copied text >    Protein Calorie Malnutrition Present: [ ]mild [ ]moderate [ ]severe [ ]underweight [ ]morbid obesity  https://www.andeal.org/vault/2440/web/files/ONC/Table_Clinical%20Characteristics%20to%20Document%20Malnutrition-White%20JV%20et%20al%202012.pdf    Height (cm): 182.9 (12-13-21 @ 04:30), 165.1 (12-02-21 @ 13:55), 177.8 (10-18-21 @ 15:55)  Weight (kg): 52.6 (01-12-22 @ 12:50), 53.6 (12-02-21 @ 13:55), 61.6 (10-18-21 @ 15:55)  BMI (kg/m2): 15.7 (01-12-22 @ 12:50), 16 (12-13-21 @ 04:30), 19.7 (12-02-21 @ 13:55)    [ ]PPSV2 < or = 30%  [ ]significant weight loss [ ]poor nutritional intake [ ]anasarca    [ ]Artificial Nutrition    REFERRALS:   [ ]Chaplaincy  [ ]Hospice  [ ]Child Life  [ ]Social Work  [ ]Case management [ ]Holistic Therapy     Goals of Care Document:

## 2022-01-31 NOTE — CHART NOTE - NSCHARTNOTESELECT_GEN_ALL_CORE
Event Note
Medicine PA./Event Note
Nutrition Services
Nutrition Services
V-tachycardia/Event Note
Event Note
Heart failure
Nutrition Services
Event Note

## 2022-01-31 NOTE — CONSULT NOTE ADULT - SUBJECTIVE AND OBJECTIVE BOX
Patient is a 66y old  Male who presents with a chief complaint of COVID (+) with AMS and hypotension (31 Jan 2022 12:34)      HPI:  65M PMH ACC/AHA stage D HF due to NICM HM2 LVAD , TV annuloplasty ring 9/12/17 as destination therapy due to severe peripheral artery disease with significant stenosis  SIADH, Depression, CKD-3 with hyperkalemia, past E. coli UTIs, driveline drainage (1/7/21) and COVID-19 (back in April 2020). Recurrent syncope post LVAD s/p ILR, and chronic abdominal pain and was noted to have a prolong hospital course (6/14-11/16). During that time he has multiple infections and now remains on suppressive antibiotics. Underwent SMA stent with Vascular in 10/2021, now tolerating PO diet, perc dawn drain placement and mutiple GI bleeds. Ultimately was trach and discharged to rehab to get stronger. Patient returned from rehab for trach decannulation, which was removed on 12/2.    Now presented from [facility] on 12/13 with AMS, hypotensive, tachycardic found to be COVID (+). Pt is lethargic and mumbling words.     Podiatry consulted for painful elongated toenails    PAST MEDICAL & SURGICAL HISTORY:  CHF (congestive heart failure)    CAD (coronary artery disease)    Depression    Pleural effusion    History of 2019 novel coronavirus disease (COVID-19)  april 2020    Hemorrhoids    Bleeding hemorrhoids    Peripheral arterial disease    Claudication    BPH with urinary obstruction    ACC/AHA stage D systolic heart failure    Anticoagulation goal of INR 2.0 to 2.5    Falls    Clavicle fracture    CKD (chronic kidney disease), stage III    Iron deficiency anemia    H/O epistaxis    Vertigo    GI bleed    S/P TVR (tricuspid valve repair)    S/P ventricular assist device    S/P endoscopy    H/O tracheostomy        MEDICATIONS  (STANDING):  baclofen 5 milliGRAM(s) Oral every 8 hours  bisacodyl 5 milliGRAM(s) Oral every 12 hours  chlorhexidine 2% Cloths 1 Application(s) Topical <User Schedule>  cholecalciferol 1000 Unit(s) Oral daily  dextrose 50% Injectable 50 milliLiter(s) IV Push every 15 minutes  dextrose 50% Injectable 25 milliLiter(s) IV Push every 15 minutes  dronabinol 2.5 milliGRAM(s) Oral two times a day before meals  gabapentin Solution 125 milliGRAM(s) Oral three times a day  HYDROmorphone  Injectable 0.2 milliGRAM(s) IV Push every 8 hours  magnesium oxide 400 milliGRAM(s) Oral three times a day with meals  melatonin 3 milliGRAM(s) Oral at bedtime  methimazole 10 milliGRAM(s) Oral daily  metoclopramide Injectable 10 milliGRAM(s) IV Push four times a day  metoprolol succinate ER 50 milliGRAM(s) Oral at bedtime  metoprolol succinate ER 25 milliGRAM(s) Oral daily  mexiletine 150 milliGRAM(s) Oral every 8 hours  mirtazapine 7.5 milliGRAM(s) Oral at bedtime  multivitamin 1 Tablet(s) Oral daily  pantoprazole    Tablet 40 milliGRAM(s) Oral every 12 hours  piperacillin/tazobactam IVPB.. 3.375 Gram(s) IV Intermittent every 8 hours  polyethylene glycol 3350 17 Gram(s) Oral daily  senna 2 Tablet(s) Oral at bedtime  sertraline 100 milliGRAM(s) Oral daily  sodium chloride 1 Gram(s) Oral every 8 hours  sodium chloride 0.9% lock flush 3 milliLiter(s) IV Push every 8 hours  spironolactone 25 milliGRAM(s) Oral daily  vancomycin    Solution 125 milliGRAM(s) Oral every 12 hours    MEDICATIONS  (PRN):  acetaminophen     Tablet .. 650 milliGRAM(s) Oral every 6 hours PRN Temp greater or equal to 38C (100.4F), Mild Pain (1 - 3), Moderate Pain (4 - 6)  HYDROmorphone  Injectable 0.2 milliGRAM(s) IV Push every 2 hours PRN Moderate to Severe pain      Allergies    Amiodarone Hydrochloride (Other (Severe))    Intolerances        VITALS:    Vital Signs Last 24 Hrs  T(C): 36.4 (31 Jan 2022 08:00), Max: 36.7 (30 Jan 2022 20:13)  T(F): 97.6 (31 Jan 2022 08:00), Max: 98 (30 Jan 2022 20:13)  HR: 80 (31 Jan 2022 08:00) (73 - 96)  BP: --  BP(mean): 82 (31 Jan 2022 08:00) (80 - 82)  RR: 18 (31 Jan 2022 08:00) (17 - 18)  SpO2: 98% (31 Jan 2022 08:00) (97% - 98%)    LABS:                          8.7    6.18  )-----------( 227      ( 31 Jan 2022 06:10 )             27.2       01-31    131<L>  |  96  |  13  ----------------------------<  71  4.1   |  25  |  0.96    Ca    9.6      31 Jan 2022 06:10    TPro  8.9<H>  /  Alb  3.1<L>  /  TBili  0.3  /  DBili  x   /  AST  18  /  ALT  11  /  AlkPhos  146<H>  01-30      CAPILLARY BLOOD GLUCOSE              LOWER EXTREMITY PHYSICAL EXAM:    Vasular: DP/PT 0/4, B/L, CFT <3 seconds B/L, Temperature gradient wnl, B/L.   Neuro: Epicritic sensation intact to the level of foot, B/L.  Musculoskeletal/Ortho: painful contracted hammertoes 2-5 bilat  Skin: toenails thickened elongated dystrophic x10 with subungual debris

## 2022-01-31 NOTE — CONSULT NOTE ADULT - REASON FOR ADMISSION
COVID (+) with AMS and hypotension

## 2022-01-31 NOTE — PROGRESS NOTE ADULT - SUBJECTIVE AND OBJECTIVE BOX
Interval History:  - frustrated that he wasn't going home because INR was low  - pain fairly controlled    Medications:  aspirin enteric coated 81 milliGRAM(s) Oral daily  dextrose 50% Injectable 25 Gram(s) IV Push once  dextrose Gel 1 Dose(s) Oral once PRN  lidocaine   Patch 1 Patch Transdermal daily  lidocaine   Patch 1 Patch Transdermal every 24 hours  lisinopril 7.5 milliGRAM(s) Oral at bedtime  metoprolol succinate ER 50 milliGRAM(s) Oral daily  mirtazapine 7.5 milliGRAM(s) Oral at bedtime  oxyCODONE    IR 5 milliGRAM(s) Oral every 6 hours PRN  pantoprazole    Tablet 40 milliGRAM(s) Oral before breakfast  polyethylene glycol 3350 17 Gram(s) Oral at bedtime  spironolactone 12.5 milliGRAM(s) Oral daily  warfarin 7.5 milliGRAM(s) Oral once      Vitals:  T(C): 36.9 (10-23-17 @ 18:00), Max: 36.9 (10-23-17 @ 06:30)  HR: 94 (10-23-17 @ 18:00) (90 - 104)  BP(mean): 68 (10-23-17 @ 18:00) (68 - 80)  RR: 18 (10-23-17 @ 18:00) (18 - 18)  SpO2: 99% (10-23-17 @ 18:00) (99% - 100%)    Daily     Daily         I&O's Summary    22 Oct 2017 07:01  -  23 Oct 2017 07:00  --------------------------------------------------------  IN: 723 mL / OUT: 850 mL / NET: -127 mL    23 Oct 2017 07:01  -  23 Oct 2017 21:57  --------------------------------------------------------  IN: 560 mL / OUT: 300 mL / NET: 260 mL        Physical Exam:  Appearance: No Acute Distress  HEENT: No JVD  Cardiovascular: Normal S1 S2, No murmurs/rubs/gallops. LVAD hum  Respiratory: Clear to auscultation bilaterally  Gastrointestinal: Soft, Non-tender	  Skin: No cyanosis	  Neurologic: Non-focal  Extremities: No LE edema  Psychiatry: A & O x 3, Mood & affect appropriate    LVAD Interrogation:  Pump Flow: 4.5  Pump Speed: 9000  Pulse Index: 4.4  Pump Power: 4.9  VAD Events: no events  Driveline evaluation:  c/d/i  Programming Changes: No changes made    Labs:                        10.5   8.6   )-----------( 262      ( 23 Oct 2017 07:06 )             31.5     10-23    135  |  97  |  22  ----------------------------<  101<H>  4.5   |  26  |  0.73    Ca    9.4      23 Oct 2017 07:06    TPro  8.8<H>  /  Alb  3.3  /  TBili  0.3  /  DBili  0.1  /  AST  27  /  ALT  21  /  AlkPhos  94  10-22    PT/INR - ( 23 Oct 2017 07:06 )   PT: 17.6 sec;   INR: 1.60 ratio      Lactate Dehydrogenase, Serum: 319 U/L (10-23 @ 07:06)  Lactate Dehydrogenase, Serum: 320 U/L (10-22 @ 06:42)  Lactate Dehydrogenase, Serum: 326 U/L (10-21 @ 06:27)    TELEMETRY:  no events Colchicine Counseling:  Patient counseled regarding adverse effects including but not limited to stomach upset (nausea, vomiting, stomach pain, or diarrhea).  Patient instructed to limit alcohol consumption while taking this medication.  Colchicine may reduce blood counts especially with prolonged use.  The patient understands that monitoring of kidney function and blood counts may be required, especially at baseline. The patient verbalized understanding of the proper use and possible adverse effects of colchicine.  All of the patient's questions and concerns were addressed.

## 2022-01-31 NOTE — PROGRESS NOTE ADULT - PROBLEM SELECTOR PLAN 3
Antidepressants as per Psych.   Continue Marinol 2.5mg bid before lunch and dinner Antidepressants as per Psych.   Will increase Marinol to 5 mg bid 1 hours before lunch and dinner

## 2022-01-31 NOTE — CONSULT NOTE ADULT - CONSULT REASON
LVAD
Splenic collection
Psychological assessment to evaluate mood
painful elongated toenails
NSVT
VAD and COVID
WCT
antiplatelets
Advanced care planning.

## 2022-01-31 NOTE — PROGRESS NOTE ADULT - ASSESSMENT
Past psychiatric history: Denies any inpatient psychiatric hospitalizations, denies s/a.     Psychological assessment: Seen resting in bed on 2DSU. Appetite "good." Seen eating grapes with a big smile, "they taste good." Denies abdominal pain. Sat in chair today. Denies symptoms of depression, anxiety. Appeared comfortable resting in bed. Watching TV. Denies issues with sleep. Provided supportive therapy.     Mental status exam: Seen resting in bed. Awake, oriented x3-4. Speech good volume. Thought process goal directed, content appropriate to conversation. No evidence of any psychosis, kenan, delusions. No SI/HI. Mood "Good." Affect less constricted. Insight and judgment adequate.     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 F06. 31 Systolic heart failure I50.2  LVAD Z95.811    Recommendations:   Seen by psychiatry  May benefit from GI consult   Seen by Palliative care   As Creole is patient’s primary language, would benefit from all information being explained using  services     Behavioral Cardiology will follow      20 minutes spent on total patient encounter

## 2022-01-31 NOTE — PROGRESS NOTE ADULT - PROBLEM SELECTOR PLAN 1
With splenic abscess seen on CT- likely source  Blood Cx (1/11) grew serratia- on IV Zosyn- continue for 3-4 weeks  IR consulted - risks of draining outweigh benefits especially given bacteremia has resolved  Given multiple medical chronic issues, pt would not be a good candidate  Palliative on board for pain/symptom control

## 2022-01-31 NOTE — PROGRESS NOTE ADULT - ASSESSMENT
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 s/p HM2 LVAD in 9/2017 at  (due to severe PAD) with TV ring, multiple GI bleeds, thus maintained off AC, CKD 3prior COVID-19 infection in 4/2020, recurrent syncope post LVAD s/p ILR, s/p prolonged complex hospitalization from 6/14-11/16/2021 initially admitted with abdominal pain complicated by GIB, respiratory failure s/p trach, multiple infections, s/p cholecystotomy tube and SMA stent 10/2021, ultimately discharged to [facility] rehab and then returned to [facility] for trach decannulation 12/2 who now presents with recurrent COVID-19 infection, initially in distributive shock. 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     He had elevated inflammatory markers which are now improving. He received monoclonal antibodies and was started on remdesivir and dexamethasone. He's afebrile with BCx from 12/18 and 12/30 NGTD. He had an episode of hypoxia noted 1/1, possibly in the setting of aspiration. He is currently saturating well on RA. He had runs of MMVT in setting of hypokalemia and being off his beta blocker which has since resolved. He currently appears well compensated from HF perspective. His LDH is stable. His LVAD is functioning well without s/s of pump malfunction.     Was noted to have positive blood culture for serratia marcescens on 1/11 and placed on Zosyn with plan for 3-4 weeks. Also had CT scan with concern of abscess but not pursuing drainage. His Hgb has overall remain stable though he is off anticoagulation/ antiplatelets. He now has a decline in his appetite and is noted to have worsening abdominal pain. His prognosis is guarded, palliative care was consulted. Family meeting held 1/26 to emphasize importance of nutrition and physical therapy.

## 2022-01-31 NOTE — CONSULT NOTE ADULT - CONSULT REQUESTED DATE/TIME
13-Dec-2021
13-Dec-2021 08:56
14-Jan-2022 13:25
19-Jan-2022 17:27
13-Dec-2021 11:21
18-Jan-2022 16:47
31-Jan-2022 13:04
13-Jan-2022 16:11
12-Jan-2022 14:00

## 2022-02-01 NOTE — PROGRESS NOTE ADULT - ASSESSMENT
Past psychiatric history: Denies any inpatient psychiatric hospitalizations, denies s/a.     Psychological assessment: Seen sitting in chair on 2DSU. Appetite "good." Denies abdominal pain. Ate entire dinner meal. Mood "good." Appeared relaxed and happy, smiling at times. Talked about staff members that he feels "take good care of me." Denies symptoms of depression, anxiety. Watches TV. Denies issues with sleep. Provided supportive therapy.     Mental status exam: Seen sitting in chair. Awake, oriented x3-4. Speech good volume. Thought process goal directed, content appropriate to conversation. No evidence of any psychosis, kenan, delusions. No SI/HI. Mood "good." Affect less constricted. Insight and judgment adequate.     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 F06. 31 Systolic heart failure I50.2  LVAD Z95.811    Recommendations:   Seen by psychiatry  Seen by Palliative care   As Creole is patient’s primary language, would benefit from all medical information being explained using  services     Behavioral Cardiology will follow      20 minutes spent on total patient encounter

## 2022-02-01 NOTE — PROGRESS NOTE ADULT - PROBLEM SELECTOR PLAN 1
With splenic abscess seen on CT- likely source  Blood Cx (1/11) grew serratia- on IV Zosyn- continue for 3-4 weeks per ID  IR consulted - risks of draining outweigh benefits especially given bacteremia has resolved  Given multiple medical chronic issues, pt would not be a good candidate

## 2022-02-01 NOTE — PROGRESS NOTE ADULT - SUBJECTIVE AND OBJECTIVE BOX
Subjective: Patient seen and examined resting in bed. ON no issues.     Medications:  acetaminophen     Tablet .. 650 milliGRAM(s) Oral every 6 hours PRN  baclofen 5 milliGRAM(s) Oral every 8 hours  bisacodyl 5 milliGRAM(s) Oral every 12 hours  chlorhexidine 2% Cloths 1 Application(s) Topical <User Schedule>  cholecalciferol 1000 Unit(s) Oral daily  dextrose 50% Injectable 25 milliLiter(s) IV Push every 15 minutes  dextrose 50% Injectable 50 milliLiter(s) IV Push every 15 minutes  dronabinol 5 milliGRAM(s) Oral two times a day before meals  gabapentin Solution 125 milliGRAM(s) Oral three times a day  HYDROmorphone  Injectable 0.2 milliGRAM(s) IV Push every 8 hours  HYDROmorphone  Injectable 0.2 milliGRAM(s) IV Push every 2 hours PRN  magnesium oxide 400 milliGRAM(s) Oral three times a day with meals  melatonin 3 milliGRAM(s) Oral at bedtime  methimazole 10 milliGRAM(s) Oral daily  metoclopramide Injectable 10 milliGRAM(s) IV Push four times a day  metoprolol succinate ER 50 milliGRAM(s) Oral at bedtime  metoprolol succinate ER 25 milliGRAM(s) Oral daily  mexiletine 150 milliGRAM(s) Oral every 8 hours  mirtazapine 7.5 milliGRAM(s) Oral at bedtime  multivitamin 1 Tablet(s) Oral daily  pantoprazole    Tablet 40 milliGRAM(s) Oral every 12 hours  piperacillin/tazobactam IVPB.. 3.375 Gram(s) IV Intermittent every 8 hours  polyethylene glycol 3350 17 Gram(s) Oral daily  senna 2 Tablet(s) Oral at bedtime  sertraline 100 milliGRAM(s) Oral daily  sodium chloride 1 Gram(s) Oral every 8 hours  sodium chloride 0.9% lock flush 3 milliLiter(s) IV Push every 8 hours  spironolactone 25 milliGRAM(s) Oral daily  vancomycin    Solution 125 milliGRAM(s) Oral every 12 hours    Vitals:  Vital Signs Last 24 Hours  T(C): 36.6 (22 @ 04:45), Max: 36.8 (22 @ 20:40)  HR: 84 (22 @ 08:45) (77 - 97)  BP: --  RR: 18 (22 @ 04:45) (18 - 18)  SpO2: 97% (22 @ 04:45) (97% - 97%)    Weight in k.7 ( @ 07:54)    I&O's Summary    2022 07:01  -  2022 07:00  --------------------------------------------------------  IN: 1080 mL / OUT: 925 mL / NET: 155 mL        Physical Exam  General: No distress. Comfortable.  HEENT: EOM intact.  Neck: Neck supple. JVP not elevated. No masses  Chest: Clear to auscultation bilaterally  CV: +VAD hum  Abdomen: Soft, non-distended, non-tender  Neurology: Alert and oriented times three.    LVAD Interrogation  VAD TYPE HM 2  Speed 9200  Flow 3.9  Power 4.9  PI 4.2  Assessment of driveline exit site: c/d/i  Programming changes: no changes made    Labs:                        8.3    6.35  )-----------( 202      ( 2022 05:43 )             26.0     -    131<L>  |  96  |  15  ----------------------------<  81  4.2   |  25  |  0.97    Ca    9.5      2022 05:43    TPro  8.5<H>  /  Alb  3.0<L>  /  TBili  0.2  /  DBili  x   /  AST  16  /  ALT  12  /  AlkPhos  135<H>                Lactate Dehydrogenase, Serum: 227 U/L ( @ 06:16)

## 2022-02-01 NOTE — PROGRESS NOTE ADULT - PROBLEM SELECTOR PROBLEM 10
Discharge planning issues
Preventive measure
Mesenteric artery stenosis
Preventive measure
Preventive measure
Mesenteric artery stenosis
Mesenteric artery stenosis
Preventive measure
Discharge planning issues
Discharge planning issues
Preventive measure

## 2022-02-01 NOTE — PROGRESS NOTE ADULT - PROBLEM SELECTOR PLAN 7
- now that he is on standing IV pain medications his abdominal pain/appetite are overall improving  - nutrition following   - of note he has failed previous calorie count, please encourage patient to increase his PO intake

## 2022-02-01 NOTE — PROGRESS NOTE ADULT - SUBJECTIVE AND OBJECTIVE BOX
Mich Krueger MD  Pager [phone]  Spectra [phone]  Cell Phone [phone]    Patient is a 66y old  Male who presents with a chief complaint of COVID (+) with AMS and hypotension (01 Feb 2022 12:51)        SUBJECTIVE / OVERNIGHT EVENTS: No new events  TELEMETRY: SR 80-90's      MEDICATIONS  (STANDING):  baclofen 5 milliGRAM(s) Oral every 8 hours  bisacodyl 5 milliGRAM(s) Oral every 12 hours  chlorhexidine 2% Cloths 1 Application(s) Topical <User Schedule>  cholecalciferol 1000 Unit(s) Oral daily  dextrose 50% Injectable 25 milliLiter(s) IV Push every 15 minutes  dextrose 50% Injectable 50 milliLiter(s) IV Push every 15 minutes  dronabinol 5 milliGRAM(s) Oral two times a day before meals  gabapentin Solution 125 milliGRAM(s) Oral three times a day  HYDROmorphone  Injectable 0.2 milliGRAM(s) IV Push every 8 hours  magnesium oxide 400 milliGRAM(s) Oral three times a day with meals  melatonin 3 milliGRAM(s) Oral at bedtime  methimazole 10 milliGRAM(s) Oral daily  metoclopramide Injectable 10 milliGRAM(s) IV Push four times a day  metoprolol succinate ER 50 milliGRAM(s) Oral at bedtime  metoprolol succinate ER 25 milliGRAM(s) Oral daily  mexiletine 150 milliGRAM(s) Oral every 8 hours  mirtazapine 7.5 milliGRAM(s) Oral at bedtime  multivitamin 1 Tablet(s) Oral daily  pantoprazole    Tablet 40 milliGRAM(s) Oral every 12 hours  piperacillin/tazobactam IVPB.. 3.375 Gram(s) IV Intermittent every 8 hours  polyethylene glycol 3350 17 Gram(s) Oral daily  senna 2 Tablet(s) Oral at bedtime  sertraline 100 milliGRAM(s) Oral daily  sodium chloride 1 Gram(s) Oral every 8 hours  sodium chloride 0.9% lock flush 3 milliLiter(s) IV Push every 8 hours  spironolactone 25 milliGRAM(s) Oral daily  vancomycin    Solution 125 milliGRAM(s) Oral every 12 hours    MEDICATIONS  (PRN):  acetaminophen     Tablet .. 650 milliGRAM(s) Oral every 6 hours PRN Temp greater or equal to 38C (100.4F), Mild Pain (1 - 3), Moderate Pain (4 - 6)  HYDROmorphone  Injectable 0.2 milliGRAM(s) IV Push every 2 hours PRN Moderate to Severe pain      Vital Signs Last 24 Hrs  T(C): 36.6 (01 Feb 2022 04:45), Max: 36.8 (31 Jan 2022 20:40)  T(F): 97.9 (01 Feb 2022 04:45), Max: 98.2 (31 Jan 2022 20:40)  HR: 81 (01 Feb 2022 12:50) (77 - 97)  BP: --  BP(mean): 78 (01 Feb 2022 12:50) (72 - 80)  RR: 18 (01 Feb 2022 04:45) (18 - 18)  SpO2: 97% (01 Feb 2022 04:45) (97% - 97%)  CAPILLARY BLOOD GLUCOSE        I&O's Summary    31 Jan 2022 07:01  -  01 Feb 2022 07:00  --------------------------------------------------------  IN: 1080 mL / OUT: 925 mL / NET: 155 mL          PHYSICAL EXAM  GENERAL: NAD, cachetic  HEAD:  Atraumatic, normocephalic  EYES: EOMI, PERRLA, conjunctiva and sclera clear  CHEST/LUNG: Clear to auscultation bilaterally; no wheezes  HEART: + LVAD hum  ABDOMEN: Soft, tender to palpation in LUQ, nondistended; bowel sounds present; +R sided driveline; +R sided cholecystostomy tube with biliary drainage  EXTREMITIES:  2+ peripheral pulses; no clubbing, cyanosis, or edema  PSYCH: AAOx3    LABS:                        8.3    6.35  )-----------( 202      ( 01 Feb 2022 05:43 )             26.0     02-01    131<L>  |  96  |  15  ----------------------------<  81  4.2   |  25  |  0.97    Ca    9.5      01 Feb 2022 05:43    TPro  8.5<H>  /  Alb  3.0<L>  /  TBili  0.2  /  DBili  x   /  AST  16  /  ALT  12  /  AlkPhos  135<H>  02-01                RADIOLOGY & ADDITIONAL TESTS:    Imaging Personally Reviewed:  Consultant(s) Notes Reviewed:    Care Discussed with Consultants/Other Providers:

## 2022-02-01 NOTE — PROGRESS NOTE ADULT - ASSESSMENT
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 s/p HM2 LVAD in 9/2017 at  (due to severe PAD) with TV ring, multiple GI bleeds, thus maintained off AC, CKD 3prior COVID-19 infection in 4/2020, recurrent syncope post LVAD s/p ILR, s/p prolonged complex hospitalization from 6/14-11/16/2021 initially admitted with abdominal pain complicated by GIB, respiratory failure s/p trach, multiple infections, s/p cholecystotomy tube and SMA stent 10/2021, ultimately discharged to [facility] rehab and then returned to [facility] for trach decannulation 12/2 who now presents with recurrent COVID-19 infection, initially in distributive shock. 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     He had elevated inflammatory markers which are now improving. He received monoclonal antibodies and was started on remdesivir and dexamethasone. He's afebrile with BCx from 12/18 and 12/30 NGTD. He had an episode of hypoxia noted 1/1, possibly in the setting of aspiration. He is currently saturating well on RA. He had runs of MMVT in setting of hypokalemia and being off his beta blocker which has since resolved. He currently appears well compensated from HF perspective. His LDH is stable. His LVAD is functioning well without s/s of pump malfunction.     Was noted to have positive blood culture for serratia marcescens on 1/11 and placed on Zosyn with plan for 3-4 weeks. Also had CT scan with concern of abscess but not pursuing drainage. His Hgb has overall remain stable though he is off anticoagulation/ antiplatelets. Now that he is on standing IV pain medications his abdominal pain is improving which as also helped improve his appetite. His prognosis is guarded, palliative care was consulted. Family meeting held 1/26 to emphasize importance of nutrition and physical therapy.

## 2022-02-01 NOTE — PROGRESS NOTE ADULT - SUBJECTIVE AND OBJECTIVE BOX
Behavioral Cardiology Progress Note     History of present illness: Mr. Quispe is a 66 year-old man with history of NICM s/p HM2 LVAD (9/2017) as DT (due to severe PAD) with TV ring, multiple GI bleeds, thus maintained off AC, CKD 3prior COVID-19 infection in 4/2020, recurrent syncope post LVAD s/p ILR, s/p prolonged complex hospitalization from 6/14-11/16/2021 initially admitted with abdominal pain complicated by GIB, respiratory failure s/p trach, multiple infections, s/p cholecystotomy tube and SMA stent 10/2021, ultimately discharged to [facility]hab and then returned to [facility] for trach decannulation 12/2 who now presents with recurrent COVID-19 infection, initially in distributive shock. 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 His main issue at this time is his malnutrition for which supplements have been added to his diet.     Social history: , lives in his sister’s house in [place]. Has 3 sons (2 live in , 1 in [facility]). Oldest son (Kang Quispe) lives in [place]. Not close with his children. One of 3 siblings. Lives in his sister’s house in [place] (Cristina Rudolph [phone]), also in the home are niece ([facility] [phone]) and nephew (Miller Rudolph).  Another sister lives close by in [place] and all other siblings live in [place] [facility] which the family visit often. Prior to health issues, worked full time at [employer] in [place], stopped working due to heart disease. Education: high school graduate.   Substance use:   ·	Tobacco: Denies. Former smoker, 8-10 cigarettes/day for 30 years, quit prior to LVAD 2017.  ·	Alcohol: Past use of 3-4 drinks of [place] 2x/week. None since health problems began.   ·	Drug: Denies any drug use.

## 2022-02-01 NOTE — PROGRESS NOTE ADULT - SUBJECTIVE AND OBJECTIVE BOX
INFECTIOUS DISEASES FOLLOW UP-- Anitra Prater  [phone]    This is a follow up note for this  66yMale with  Sepsis        ROS:  CONSTITUTIONAL:  No fever, good appetite  CARDIOVASCULAR:  No chest pain or palpitations  RESPIRATORY:  No dyspnea  GASTROINTESTINAL:  No nausea, vomiting, diarrhea, or abdominal pain  GENITOURINARY:  No dysuria  NEUROLOGIC:  No headache,     Allergies    Amiodarone Hydrochloride (Other (Severe))    Intolerances        ANTIBIOTICS/RELEVANT:  antimicrobials  piperacillin/tazobactam IVPB.. 3.375 Gram(s) IV Intermittent every 8 hours  vancomycin    Solution 125 milliGRAM(s) Oral every 12 hours    immunologic:    OTHER:  acetaminophen     Tablet .. 650 milliGRAM(s) Oral every 6 hours PRN  baclofen 5 milliGRAM(s) Oral every 8 hours  bisacodyl 5 milliGRAM(s) Oral every 12 hours  chlorhexidine 2% Cloths 1 Application(s) Topical <User Schedule>  cholecalciferol 1000 Unit(s) Oral daily  dextrose 50% Injectable 25 milliLiter(s) IV Push every 15 minutes  dextrose 50% Injectable 50 milliLiter(s) IV Push every 15 minutes  dronabinol 5 milliGRAM(s) Oral two times a day before meals  gabapentin Solution 125 milliGRAM(s) Oral three times a day  HYDROmorphone  Injectable 0.2 milliGRAM(s) IV Push every 8 hours  HYDROmorphone  Injectable 0.2 milliGRAM(s) IV Push every 2 hours PRN  magnesium oxide 400 milliGRAM(s) Oral three times a day with meals  melatonin 3 milliGRAM(s) Oral at bedtime  methimazole 10 milliGRAM(s) Oral daily  metoclopramide Injectable 10 milliGRAM(s) IV Push four times a day  metoprolol succinate ER 50 milliGRAM(s) Oral at bedtime  metoprolol succinate ER 25 milliGRAM(s) Oral daily  mexiletine 150 milliGRAM(s) Oral every 8 hours  mirtazapine 7.5 milliGRAM(s) Oral at bedtime  multivitamin 1 Tablet(s) Oral daily  pantoprazole    Tablet 40 milliGRAM(s) Oral every 12 hours  polyethylene glycol 3350 17 Gram(s) Oral daily  senna 2 Tablet(s) Oral at bedtime  sertraline 100 milliGRAM(s) Oral daily  sodium chloride 1 Gram(s) Oral every 8 hours  sodium chloride 0.9% lock flush 3 milliLiter(s) IV Push every 8 hours  spironolactone 25 milliGRAM(s) Oral daily      Objective:  Vital Signs Last 24 Hrs  T(C): 36.6 (01 Feb 2022 04:45), Max: 36.8 (31 Jan 2022 20:40)  T(F): 97.9 (01 Feb 2022 04:45), Max: 98.2 (31 Jan 2022 20:40)  HR: 81 (01 Feb 2022 12:50) (77 - 97)  BP: --  BP(mean): 78 (01 Feb 2022 12:50) (72 - 80)  RR: 18 (01 Feb 2022 04:45) (18 - 18)  SpO2: 97% (01 Feb 2022 04:45) (97% - 97%)    PHYSICAL EXAM:  Constitutional:no acute distress  Eyes:ALONSO, EOMI  Ear/Nose/Throat: no oral lesions, 	  Respiratory: clear BL  Cardiovascular: S1S2  Gastrointestinal:soft, (+) BS, no tenderness  Extremities:no e/e/c  No Lymphadenopathy  IV sites not inflammed.    LABS:                        8.3    6.35  )-----------( 202      ( 01 Feb 2022 05:43 )             26.0     02-01    131<L>  |  96  |  15  ----------------------------<  81  4.2   |  25  |  0.97    Ca    9.5      01 Feb 2022 05:43    TPro  8.5<H>  /  Alb  3.0<L>  /  TBili  0.2  /  DBili  x   /  AST  16  /  ALT  12  /  AlkPhos  135<H>  02-01          MICROBIOLOGY:            RECENT CULTURES:      RADIOLOGY & ADDITIONAL STUDIES:    < from: Xray Kidney Ureter Bladder (01.20.22 @ 07:06) >  IMPRESSION:  Nonobstructive bowel gas pattern.    < end of copied text >

## 2022-02-01 NOTE — PROGRESS NOTE ADULT - ASSESSMENT
64yo man with a history of VAD palcement, history of COVID infeciton in April 2020, history of a prolonged hospitalization in 2021 with recurrent sepsis, pneumonia, intubated/trach x2 cycles , chronic gall bladder disease s/p perc dawn, SMA ischemia reuiring a stent placement, cachecxia who was discharged to rehab but is presented from [facility] on 12/13 with AMS, hypotensive, tachycardic found to be COVID (+). Pt is lethargic and mumbling words required ICU admission and pressor support but recovered and is off oxygen on general cardiac floor    Recurrent GNR bacteremia/serratia:  stable on Zosyn therapy  likely splenic abscess- patient reluctant to permit drainage      COVID infection-   confirmed second bout of COVID by documented PCR testing  Ideally, would be interested in sequencing the virus to determine strain (omicron vs delta)  Received a dose of monoclonal antibodies Regeneron product   Completed remdesivir therapy  Completed ten days of dexamethasone  Refuses most vaccines- but will need to wait three months  to receive COVID vaccine series post monoclonal  Will administer flu/pneumonia vaccines this admission prior to discharge if he will permit      Recurrent serratia bacteremia  blood cultures clear  serratia sensitive to Zosyn continue present therapy  plan to complete 3-4 weeks IV abx    Likely splenic abscess 4cm collection seen on abdominal CT scan:  Likely embolic in nature  IR consult noted - risks of drainage may outweigh benefits  Patient has cleared bacteremia on current antibiotic but may recur once antibiotics are stopped  Patient voicing that he is not interested in drainage of collection      Prior severe C.diff infection   pre-emptive oral Vanco 125mg bid      Morris Prater MD  [phone]  After 5pm/weekends [phone]   64yo man with a history of VAD palcement, history of COVID infeciton in April 2020, history of a prolonged hospitalization in 2021 with recurrent sepsis, pneumonia, intubated/trach x2 cycles , chronic gall bladder disease s/p perc dawn, SMA ischemia reuiring a stent placement, cachecxia who was discharged to rehab but is presented from [facility] on 12/13 with AMS, hypotensive, tachycardic found to be COVID (+). Pt is lethargic and mumbling words required ICU admission and pressor support but recovered and is off oxygen on general cardiac floor    Recurrent GNR bacteremia/serratia:  stable on Zosyn therapy  likely splenic abscess- patient reluctant to permit drainage      COVID infection-   confirmed second bout of COVID by documented PCR testing  Ideally, would be interested in sequencing the virus to determine strain (omicron vs delta)  Received a dose of monoclonal antibodies Regeneron product   Completed remdesivir therapy  Completed ten days of dexamethasone  Refuses most vaccines- but will need to wait three months  to receive COVID vaccine series post monoclonal  Will administer flu/pneumonia vaccines this admission prior to discharge if he will permit-refusing      Recurrent serratia bacteremia  blood cultures clear  serratia sensitive to Zosyn continue present therapy  plan to complete 4 weeks IV abx through 2/11    Likely splenic abscess 4cm collection seen on abdominal CT scan:  Likely embolic in nature  IR consult noted - risks of drainage may outweigh benefits  Patient has cleared bacteremia on current antibiotic but may recur once antibiotics are stopped  Patient voicing that he is not interested in drainage of collection  will try oral abx suppression      Prior severe C.diff infection   pre-emptive oral Vanco 125mg bid      Morris Prater MD  [phone]  After 5pm/weekends [phone]

## 2022-02-01 NOTE — PROGRESS NOTE ADULT - ASSESSMENT
65M PMH LVAD, recurrent sepsis, pneumonia, intubated/trach x2 cycles, chronic gall bladder disease s/p percutaneous cholecystectomy, SMA ischemia s/p stent, cachexia who presented from [facility] on 12/13 with septic shock 2/2 COVID (+) S/P pressor support now titrated off and downgraded to the floor. Hosp course c/b serratia bacteremia on IV Zosyn through 1/25, aspiration PNA, VT due to hypokalemia, and now CT finding of splenic abscess medically managing.

## 2022-02-01 NOTE — PROGRESS NOTE ADULT - PROBLEM SELECTOR PLAN 10
LVAD accepting facility expressing interest  ID f/u- duration of IV abx, possible PICC?  Palliative care has signed off- Patient more compliant with medications since last palliative meeting on 1/26  Remains full code and does not want LVAD deactivated

## 2022-02-02 NOTE — PROGRESS NOTE ADULT - SUBJECTIVE AND OBJECTIVE BOX
Subjective: Patient seen and examined resting in bed. Appetite is improving     Medications:  acetaminophen     Tablet .. 650 milliGRAM(s) Oral every 6 hours PRN  baclofen 5 milliGRAM(s) Oral every 8 hours  bisacodyl 5 milliGRAM(s) Oral every 12 hours  chlorhexidine 2% Cloths 1 Application(s) Topical <User Schedule>  cholecalciferol 1000 Unit(s) Oral daily  dextrose 50% Injectable 25 milliLiter(s) IV Push every 15 minutes  dextrose 50% Injectable 50 milliLiter(s) IV Push every 15 minutes  dronabinol 5 milliGRAM(s) Oral two times a day before meals  gabapentin Solution 125 milliGRAM(s) Oral three times a day  HYDROmorphone  Injectable 0.2 milliGRAM(s) IV Push every 8 hours  HYDROmorphone  Injectable 0.2 milliGRAM(s) IV Push every 2 hours PRN  magnesium oxide 400 milliGRAM(s) Oral three times a day with meals  melatonin 3 milliGRAM(s) Oral at bedtime  methimazole 10 milliGRAM(s) Oral daily  metoclopramide Injectable 10 milliGRAM(s) IV Push four times a day  metoprolol succinate ER 50 milliGRAM(s) Oral at bedtime  metoprolol succinate ER 25 milliGRAM(s) Oral daily  mexiletine 150 milliGRAM(s) Oral every 8 hours  mirtazapine 7.5 milliGRAM(s) Oral at bedtime  multivitamin 1 Tablet(s) Oral daily  pantoprazole    Tablet 40 milliGRAM(s) Oral every 12 hours  piperacillin/tazobactam IVPB.. 3.375 Gram(s) IV Intermittent every 8 hours  polyethylene glycol 3350 17 Gram(s) Oral daily  senna 2 Tablet(s) Oral at bedtime  sertraline 100 milliGRAM(s) Oral daily  sodium chloride 1 Gram(s) Oral every 8 hours  sodium chloride 0.9% lock flush 3 milliLiter(s) IV Push every 8 hours  spironolactone 25 milliGRAM(s) Oral daily  vancomycin    Solution 125 milliGRAM(s) Oral every 12 hours    Vitals:  Vital Signs Last 24 Hours  T(C): 36.7 (22 @ 06:39), Max: 36.7 (22 @ 21:56)  HR: 83 (22 @ 06:39) (79 - 84)  BP: --  RR: 18 (22 @ 06:39) (18 - 18)  SpO2: 98% (22 @ 06:39) (97% - 98%)    Weight in k.7 ( @ 07:54)    I&O's Summary    2022 07:01  -  2022 07:00  --------------------------------------------------------  IN: 320 mL / OUT: 1450 mL / NET: -1130 mL      Physical Exam  General: No distress. Comfortable.  HEENT: EOM intact.  Neck: Neck supple. JVP not elevated. No masses  Chest: Clear to auscultation bilaterally  CV: +VAD hum  Abdomen: Soft, non-distended, non-tender, +bilary drain   Skin: No rashes or skin breakdown  Neurology: Alert and oriented times three.     LVAD Interrogation  VAD TYPE HM 2  Speed 9200  Flow 4.1  Power 4.9  PI  5.6  Assessment of driveline exit site: driveline dressing c/d/i  Programming changes: no changes made    Labs:                        8.3    6.35  )-----------( 202      ( 2022 05:43 )             26.0         135  |  98  |  14  ----------------------------<  77  4.1   |  27  |  0.95    Ca    9.7      2022 05:39    TPro  8.8<H>  /  Alb  3.1<L>  /  TBili  0.2  /  DBili  x   /  AST  20  /  ALT  11  /  AlkPhos  139<H>

## 2022-02-02 NOTE — PROGRESS NOTE ADULT - ASSESSMENT
65M PMH LVAD, recurrent sepsis, pneumonia, intubated/trach x2 cycles, chronic gall bladder disease s/p percutaneous cholecystectomy, SMA ischemia s/p stent, cachexia who presented from [facility] on 12/13 with septic shock 2/2 COVID (+) S/P pressor support now titrated off and downgraded to the floor. Hosp course c/b serratia bacteremia on IV Zosyn through 1/25, aspiration PNA, VT due to hypokalemia, and now CT finding of splenic abscess medically managing.

## 2022-02-02 NOTE — PROGRESS NOTE ADULT - PROBLEM SELECTOR PLAN 7
Patient had two LVAD capable accepting facilities- plan for d/c to facility upon completion of IV antibiotic therapy on 2/11  Palliative care has signed off- Patient more compliant with medications since last palliative meeting on 1/26  Remains full code and does not want LVAD deactivated

## 2022-02-02 NOTE — PROGRESS NOTE ADULT - ASSESSMENT
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 for trach decannulation 12/2 who now presents with recurrent COVID-19 infection, initially in distributive shock. 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     He had elevated inflammatory markers which are now improving. He received monoclonal antibodies and was started on remdesivir and dexamethasone. He's afebrile with BCx from 12/18 and 12/30 NGTD. He had an episode of hypoxia noted 1/1, possibly in the setting of aspiration. He is currently saturating well on RA. He had runs of MMVT in setting of hypokalemia and being off his beta blocker which has since resolved. He currently appears well compensated from HF perspective. His LDH is stable. His LVAD is functioning well without s/s of pump malfunction.     Was noted to have positive blood culture for serratia marcescens on 1/11 and placed on Zosyn with plan for 3-4 weeks. Also had CT scan with concern of abscess but not pursuing drainage. His Hgb has overall remain stable though he is off anticoagulation/ antiplatelets. Now that he is on standing IV pain medications his abdominal pain is improving which as also helped improve his appetite. His prognosis is guarded, palliative care was consulted. Family meeting held 1/26 to emphasize importance of nutrition and physical therapy.

## 2022-02-02 NOTE — PROGRESS NOTE ADULT - PROBLEM SELECTOR PLAN 1
With splenic abscess seen on CT- likely source  Blood Cx (1/11) grew serratia- continue IV Zosyn -> 2/11 then plan on PO suppressive therapy  IR consulted - risks of draining outweigh benefits especially given bacteremia has resolved  Given multiple medical chronic issues, pt would not be a good candidate

## 2022-02-02 NOTE — PROGRESS NOTE ADULT - SUBJECTIVE AND OBJECTIVE BOX
Mich Krueger MD  Pager [phone]  Spectra [phone]  Cell Phone [phone]    Patient is a 66y old  Male who presents with a chief complaint of COVID (+) with AMS and hypotension (02 Feb 2022 07:22)        SUBJECTIVE / OVERNIGHT EVENTS: No new events  TELEMETRY: SR 70-90's      MEDICATIONS  (STANDING):  baclofen 5 milliGRAM(s) Oral every 8 hours  bisacodyl 5 milliGRAM(s) Oral every 12 hours  chlorhexidine 2% Cloths 1 Application(s) Topical <User Schedule>  cholecalciferol 1000 Unit(s) Oral daily  dextrose 50% Injectable 25 milliLiter(s) IV Push every 15 minutes  dextrose 50% Injectable 50 milliLiter(s) IV Push every 15 minutes  dronabinol 5 milliGRAM(s) Oral two times a day before meals  gabapentin Solution 125 milliGRAM(s) Oral three times a day  HYDROmorphone  Injectable 0.2 milliGRAM(s) IV Push every 8 hours  magnesium oxide 400 milliGRAM(s) Oral three times a day with meals  melatonin 3 milliGRAM(s) Oral at bedtime  methimazole 10 milliGRAM(s) Oral daily  metoclopramide Injectable 10 milliGRAM(s) IV Push four times a day  metoprolol succinate ER 50 milliGRAM(s) Oral at bedtime  metoprolol succinate ER 25 milliGRAM(s) Oral daily  mexiletine 150 milliGRAM(s) Oral every 8 hours  mirtazapine 7.5 milliGRAM(s) Oral at bedtime  multivitamin 1 Tablet(s) Oral daily  pantoprazole    Tablet 40 milliGRAM(s) Oral every 12 hours  piperacillin/tazobactam IVPB.. 3.375 Gram(s) IV Intermittent every 8 hours  polyethylene glycol 3350 17 Gram(s) Oral daily  senna 2 Tablet(s) Oral at bedtime  sertraline 100 milliGRAM(s) Oral daily  sodium chloride 1 Gram(s) Oral every 8 hours  sodium chloride 0.9% lock flush 3 milliLiter(s) IV Push every 8 hours  spironolactone 25 milliGRAM(s) Oral daily  vancomycin    Solution 125 milliGRAM(s) Oral every 12 hours    MEDICATIONS  (PRN):  acetaminophen     Tablet .. 650 milliGRAM(s) Oral every 6 hours PRN Temp greater or equal to 38C (100.4F), Mild Pain (1 - 3), Moderate Pain (4 - 6)  HYDROmorphone  Injectable 0.2 milliGRAM(s) IV Push every 2 hours PRN Moderate to Severe pain      Vital Signs Last 24 Hrs  T(C): 36.3 (02 Feb 2022 12:00), Max: 36.7 (01 Feb 2022 21:56)  T(F): 97.4 (02 Feb 2022 12:00), Max: 98 (01 Feb 2022 21:56)  HR: 89 (02 Feb 2022 12:00) (79 - 89)  BP: --  BP(mean): 81 (02 Feb 2022 06:39) (74 - 86)  RR: 18 (02 Feb 2022 12:00) (18 - 18)  SpO2: 100% (02 Feb 2022 12:00) (97% - 100%)  CAPILLARY BLOOD GLUCOSE        I&O's Summary    01 Feb 2022 07:01  -  02 Feb 2022 07:00  --------------------------------------------------------  IN: 320 mL / OUT: 1450 mL / NET: -1130 mL    02 Feb 2022 07:01  -  02 Feb 2022 13:25  --------------------------------------------------------  IN: 0 mL / OUT: 450 mL / NET: -450 mL          PHYSICAL EXAM  GENERAL: NAD, cachetic  HEAD:  Atraumatic, normocephalic  EYES: EOMI, PERRLA, conjunctiva and sclera clear  CHEST/LUNG: Clear to auscultation bilaterally; no wheezes  HEART: + LVAD hum  ABDOMEN: Soft, tender to palpation in LUQ, nondistended; bowel sounds present; +R sided driveline; +R sided cholecystostomy tube with biliary drainage  EXTREMITIES:  2+ peripheral pulses; no clubbing, cyanosis, or edema  PSYCH: AAOx3      LABS:                        8.3    6.35  )-----------( 202      ( 01 Feb 2022 05:43 )             26.0     02-02    135  |  98  |  14  ----------------------------<  77  4.1   |  27  |  0.95    Ca    9.7      02 Feb 2022 05:39    TPro  8.8<H>  /  Alb  3.1<L>  /  TBili  0.2  /  DBili  x   /  AST  20  /  ALT  11  /  AlkPhos  139<H>  02-02                RADIOLOGY & ADDITIONAL TESTS:    Imaging Personally Reviewed:  Consultant(s) Notes Reviewed:    Care Discussed with Consultants/Other Providers:

## 2022-02-02 NOTE — PROGRESS NOTE ADULT - ATTENDING COMMENTS
meds: vanco PO, toprol 50/25, clemente 150 tid, ald 25, cirilo, dilaudid, melatonin, mertazaipine, mg ox. reglan 10 IV. methimazole. agustinan   LVAD: 9200, 4.1l/min.   HR SR . MAPs 80-82, afebrile. 118. stable.   02-02  135  |  98  |  14  ----------------------------<  77  4.1   |  27  |  0.95  Ca    9.7      02 Feb 2022 05:39    TPro  8.8<H>  /  Alb  3.1<L>  /  TBili  0.2  /  DBili  x   /  AST  20  /  ALT  11  /  AlkPhos  139<H>  02-02                        8.3    6.35  )-----------( 202      ( 01 Feb 2022 05:43 )             26.0   patient tolerating feeds and eating. slow progress with PT.  plan is to find inpatient rehab that will accept him.  eloisa murillo for splenic abscess until 2.11  Zi Werner

## 2022-02-02 NOTE — PROGRESS NOTE ADULT - ATTENDING SUPERVISION STATEMENT
ACP
Fellow
ACP
Fellow
ACP
ACP
Resident
ACP
Resident
ACP
Resident
Fellow
Resident
ACP
Resident

## 2022-02-02 NOTE — PROGRESS NOTE ADULT - PROBLEM SELECTOR PLAN 2
With LVAD  Continue spironolactone 25 mg QD  Continue metoprolol succinate 25 mg am and 50 mg QHS  Continue salt tabs for hyponatremia   Continue Mexiletine 150 mg q8- hx WCT  Off ASA due to anemia

## 2022-02-03 NOTE — PROGRESS NOTE ADULT - SUBJECTIVE AND OBJECTIVE BOX
Subjective: Patient seen and examined resting in bed.     Medications:  acetaminophen     Tablet .. 650 milliGRAM(s) Oral every 6 hours PRN  baclofen 5 milliGRAM(s) Oral every 8 hours  bisacodyl 5 milliGRAM(s) Oral every 12 hours  chlorhexidine 2% Cloths 1 Application(s) Topical <User Schedule>  cholecalciferol 1000 Unit(s) Oral daily  dextrose 50% Injectable 25 milliLiter(s) IV Push every 15 minutes  dextrose 50% Injectable 50 milliLiter(s) IV Push every 15 minutes  dronabinol 5 milliGRAM(s) Oral two times a day before meals  gabapentin Solution 125 milliGRAM(s) Oral three times a day  HYDROmorphone  Injectable 0.2 milliGRAM(s) IV Push every 8 hours  HYDROmorphone  Injectable 0.2 milliGRAM(s) IV Push every 2 hours PRN  magnesium oxide 400 milliGRAM(s) Oral three times a day with meals  melatonin 3 milliGRAM(s) Oral at bedtime  methimazole 10 milliGRAM(s) Oral daily  metoclopramide Injectable 10 milliGRAM(s) IV Push four times a day  metoprolol succinate ER 50 milliGRAM(s) Oral at bedtime  metoprolol succinate ER 25 milliGRAM(s) Oral daily  mexiletine 150 milliGRAM(s) Oral every 8 hours  mirtazapine 7.5 milliGRAM(s) Oral at bedtime  multivitamin 1 Tablet(s) Oral daily  pantoprazole    Tablet 40 milliGRAM(s) Oral every 12 hours  piperacillin/tazobactam IVPB.. 3.375 Gram(s) IV Intermittent every 8 hours  polyethylene glycol 3350 17 Gram(s) Oral daily  senna 2 Tablet(s) Oral at bedtime  sertraline 100 milliGRAM(s) Oral daily  sodium chloride 1 Gram(s) Oral every 8 hours  sodium chloride 0.9% lock flush 3 milliLiter(s) IV Push every 8 hours  spironolactone 25 milliGRAM(s) Oral daily  vancomycin    Solution 125 milliGRAM(s) Oral every 12 hours        Vitals:  Vital Signs Last 24 Hours  T(C): 36.3 (02-03-22 @ 04:38), Max: 36.8 (02-02-22 @ 21:46)  HR: 82 (02-03-22 @ 04:38) (82 - 98)  BP: --  RR: 16 (02-03-22 @ 04:38) (16 - 18)  SpO2: 97% (02-03-22 @ 04:38) (97% - 100%)        I&O's Summary    02 Feb 2022 07:01  -  03 Feb 2022 07:00  --------------------------------------------------------  IN: 120 mL / OUT: 1125 mL / NET: -1005 mL        Physical Exam  General: No distress. Comfortable.  HEENT: EOM intact.  Neck: Neck supple. JVP not elevated. No masses  Chest: Clear to auscultation bilaterally  CV: +VAD hum  Abdomen: Soft, non-distended, non-tender, +bilary drain   Neurology: Alert and oriented times three    LVAD Interrogation  VAD TYPE HM 2  Speed 9200  Flow 4.6  Power 5.2  PI  6.1  Assessment of driveline exit site: driveline dressing c/d/i  Programming changes: no changes made    Labs:                        8.4    7.86  )-----------( 221      ( 03 Feb 2022 06:22 )             26.4     02-03    134<L>  |  97  |  15  ----------------------------<  90  4.1   |  26  |  0.94    Ca    9.5      03 Feb 2022 06:22    TPro  8.8<H>  /  Alb  3.1<L>  /  TBili  0.2  /  DBili  x   /  AST  20  /  ALT  11  /  AlkPhos  139<H>  02-02

## 2022-02-03 NOTE — PROGRESS NOTE ADULT - ASSESSMENT
65M PMH LVAD, recurrent sepsis, pneumonia, intubated/trach x2 cycles, chronic gall bladder disease s/p percutaneous cholecystectomy, SMA ischemia s/p stent, cachexia who presented from [facility] on 12/13 with septic shock 2/2 COVID (+) S/P pressor support now titrated off and downgraded to the floor. Hosp course c/b serratia bacteremia on IV Zosyn through 1/25, aspiration PNA, VT due to hypokalemia, and now CT finding of splenic abscess medically managing.

## 2022-02-03 NOTE — PROVIDER CONTACT NOTE (OTHER) - NAME OF MD/NP/PA/DO NOTIFIED:
CLAUDIA BOSWELL
CLAUDIA Mi
CLAUDIA Perez
Jennifer MORIN
Kandi Barboza, Medicine NP
LORRIE Pena PA
RRT team
Macie MORIN
Unit leadership and bed board.
Guadalupe TAYLOR
Guadalupe TAYLOR
PATIENCE FAIRCHILD
Scarlet Shallow NP
Shilpa LONG)
Team 4
Team 4 MD Debbie Alaniz
Zi Werner
Debbie Alaniz MD (T4)
Macie MORIN
CLAUDIA Merlos
Corbin, Team 4
Gertrude Tummings T4
Sarah Jose MD (T4)
Soni Low NP
Jennifer MORIN

## 2022-02-03 NOTE — PROGRESS NOTE ADULT - SUBJECTIVE AND OBJECTIVE BOX
Mich Krueger MD  Pager [phone]  Spectra [phone]  Cell Phone [phone]    Patient is a 66y old  Male who presents with a chief complaint of COVID (+) with AMS and hypotension (03 Feb 2022 07:22)        SUBJECTIVE / OVERNIGHT EVENTS: No new events  TELEMETRY: SR 80-90's      MEDICATIONS  (STANDING):  baclofen 5 milliGRAM(s) Oral every 8 hours  bisacodyl 5 milliGRAM(s) Oral every 12 hours  chlorhexidine 2% Cloths 1 Application(s) Topical <User Schedule>  cholecalciferol 1000 Unit(s) Oral daily  dextrose 50% Injectable 25 milliLiter(s) IV Push every 15 minutes  dextrose 50% Injectable 50 milliLiter(s) IV Push every 15 minutes  dronabinol 5 milliGRAM(s) Oral two times a day before meals  gabapentin Solution 125 milliGRAM(s) Oral three times a day  HYDROmorphone  Injectable 0.2 milliGRAM(s) IV Push every 8 hours  magnesium oxide 400 milliGRAM(s) Oral three times a day with meals  melatonin 3 milliGRAM(s) Oral at bedtime  methimazole 10 milliGRAM(s) Oral daily  metoclopramide Injectable 10 milliGRAM(s) IV Push four times a day  metoprolol succinate ER 50 milliGRAM(s) Oral at bedtime  metoprolol succinate ER 25 milliGRAM(s) Oral daily  mexiletine 150 milliGRAM(s) Oral every 8 hours  mirtazapine 7.5 milliGRAM(s) Oral at bedtime  multivitamin 1 Tablet(s) Oral daily  pantoprazole    Tablet 40 milliGRAM(s) Oral every 12 hours  piperacillin/tazobactam IVPB.. 3.375 Gram(s) IV Intermittent every 8 hours  polyethylene glycol 3350 17 Gram(s) Oral daily  senna 2 Tablet(s) Oral at bedtime  sertraline 100 milliGRAM(s) Oral daily  sodium chloride 1 Gram(s) Oral every 8 hours  sodium chloride 0.9% lock flush 3 milliLiter(s) IV Push every 8 hours  spironolactone 25 milliGRAM(s) Oral daily  vancomycin    Solution 125 milliGRAM(s) Oral every 12 hours    MEDICATIONS  (PRN):  acetaminophen     Tablet .. 650 milliGRAM(s) Oral every 6 hours PRN Temp greater or equal to 38C (100.4F), Mild Pain (1 - 3), Moderate Pain (4 - 6)  HYDROmorphone  Injectable 0.2 milliGRAM(s) IV Push every 2 hours PRN Moderate to Severe pain      Vital Signs Last 24 Hrs  T(C): 36.7 (03 Feb 2022 09:00), Max: 36.8 (02 Feb 2022 21:46)  T(F): 98 (03 Feb 2022 09:00), Max: 98.2 (02 Feb 2022 21:46)  HR: 88 (03 Feb 2022 09:00) (82 - 98)  BP: --  BP(mean): 76 (03 Feb 2022 12:31) (76 - 89)  RR: 18 (03 Feb 2022 09:00) (16 - 18)  SpO2: 98% (03 Feb 2022 09:00) (97% - 99%)  CAPILLARY BLOOD GLUCOSE        I&O's Summary    02 Feb 2022 07:01  -  03 Feb 2022 07:00  --------------------------------------------------------  IN: 120 mL / OUT: 1375 mL / NET: -1255 mL    03 Feb 2022 07:01  -  03 Feb 2022 15:55  --------------------------------------------------------  IN: 485 mL / OUT: 350 mL / NET: 135 mL          PHYSICAL EXAM  GENERAL: NAD, cachetic  HEAD:  Atraumatic, normocephalic  EYES: EOMI, PERRLA, conjunctiva and sclera clear  CHEST/LUNG: Clear to auscultation bilaterally; no wheezes  HEART: + LVAD hum  ABDOMEN: Soft, tender to palpation in LUQ, nondistended; bowel sounds present; +R sided driveline; +R sided cholecystostomy tube with biliary drainage  EXTREMITIES:  2+ peripheral pulses; no clubbing, cyanosis, or edema  PSYCH: AAOx3      LABS:                        8.4    7.86  )-----------( 221      ( 03 Feb 2022 06:22 )             26.4     02-03    134<L>  |  97  |  15  ----------------------------<  90  4.1   |  26  |  0.94    Ca    9.5      03 Feb 2022 06:22    TPro  8.8<H>  /  Alb  3.1<L>  /  TBili  0.2  /  DBili  x   /  AST  20  /  ALT  11  /  AlkPhos  139<H>  02-02                RADIOLOGY & ADDITIONAL TESTS:    Imaging Personally Reviewed:  Consultant(s) Notes Reviewed:    Care Discussed with Consultants/Other Providers:

## 2022-02-03 NOTE — PROVIDER CONTACT NOTE (OTHER) - REASON
Patient refusing a lot of medication
Pt refusing baclofen and vancomycin
Patient refusing medications
Potential LVAD short to shield
Pt had 21 beats WCT on telemetry
Pt had 9 beats WCT on tele.
Discontinuation of Isolation for COVID patient
Patient refusing a lot of medication throughout shift
Pt. had 73 beats WCT
Pt. refusing medications througout shift
15 beats of wct
Pt needed a controller change out
pt had another run of WCT
31 bts of WCT
Pt BP 83/62 MAP 69
Pt hypotensive BP 85/64, MAP 71 and pt due for AM metoprolol
Pt tele event
pt refusing am doses of k phos PO, vanco PO, baclofen
Patient had 52 beats wide complex
Pt had WCT for 25 seconds, HR up to 170's
Tele Event
inappropriate pacing on tele
Patient aspirated food, O2 dropped to 85%
Patient had 16 beats WCT, then 22 beats WCT
Pt BP 89/65; ordered to give AM metoprolol and spironolactone

## 2022-02-03 NOTE — PROGRESS NOTE ADULT - SUBJECTIVE AND OBJECTIVE BOX
INFECTIOUS DISEASES FOLLOW UP-- Anitra Prater  [phone]    This is a follow up note for this  66yMale with  Sepsis        ROS:  CONSTITUTIONAL:  No fever, good appetite  CARDIOVASCULAR:  No chest pain or palpitations  RESPIRATORY:  No dyspnea  GASTROINTESTINAL:  No nausea, vomiting, diarrhea, or abdominal pain  GENITOURINARY:  No dysuria  NEUROLOGIC:  No headache,     Allergies    Amiodarone Hydrochloride (Other (Severe))    Intolerances        ANTIBIOTICS/RELEVANT:  antimicrobials  piperacillin/tazobactam IVPB.. 3.375 Gram(s) IV Intermittent every 8 hours  vancomycin    Solution 125 milliGRAM(s) Oral every 12 hours    immunologic:    OTHER:  acetaminophen     Tablet .. 650 milliGRAM(s) Oral every 6 hours PRN  baclofen 5 milliGRAM(s) Oral every 8 hours  bisacodyl 5 milliGRAM(s) Oral every 12 hours  chlorhexidine 2% Cloths 1 Application(s) Topical <User Schedule>  cholecalciferol 1000 Unit(s) Oral daily  dextrose 50% Injectable 50 milliLiter(s) IV Push every 15 minutes  dextrose 50% Injectable 25 milliLiter(s) IV Push every 15 minutes  dronabinol 5 milliGRAM(s) Oral two times a day before meals  gabapentin Solution 125 milliGRAM(s) Oral three times a day  HYDROmorphone  Injectable 0.2 milliGRAM(s) IV Push every 8 hours  HYDROmorphone  Injectable 0.2 milliGRAM(s) IV Push every 2 hours PRN  magnesium oxide 400 milliGRAM(s) Oral three times a day with meals  melatonin 3 milliGRAM(s) Oral at bedtime  methimazole 10 milliGRAM(s) Oral daily  metoclopramide Injectable 10 milliGRAM(s) IV Push four times a day  metoprolol succinate ER 50 milliGRAM(s) Oral at bedtime  metoprolol succinate ER 25 milliGRAM(s) Oral daily  mexiletine 150 milliGRAM(s) Oral every 8 hours  mirtazapine 7.5 milliGRAM(s) Oral at bedtime  multivitamin 1 Tablet(s) Oral daily  pantoprazole    Tablet 40 milliGRAM(s) Oral every 12 hours  polyethylene glycol 3350 17 Gram(s) Oral daily  senna 2 Tablet(s) Oral at bedtime  sertraline 100 milliGRAM(s) Oral daily  sodium chloride 1 Gram(s) Oral every 8 hours  sodium chloride 0.9% lock flush 3 milliLiter(s) IV Push every 8 hours  spironolactone 25 milliGRAM(s) Oral daily      Objective:  Vital Signs Last 24 Hrs  T(C): 36.7 (03 Feb 2022 09:00), Max: 36.8 (02 Feb 2022 21:46)  T(F): 98 (03 Feb 2022 09:00), Max: 98.2 (02 Feb 2022 21:46)  HR: 88 (03 Feb 2022 09:00) (82 - 98)  BP: --  BP(mean): 76 (03 Feb 2022 12:31) (76 - 89)  RR: 18 (03 Feb 2022 09:00) (16 - 18)  SpO2: 98% (03 Feb 2022 09:00) (97% - 99%)    PHYSICAL EXAM:  Constitutional:no acute distress  Eyes:ALONSO, EOMI  Ear/Nose/Throat: no oral lesions, 	  Respiratory: clear BL  Cardiovascular: S1S2  Gastrointestinal:soft, (+) BS, no tenderness  Extremities:no e/e/c  No Lymphadenopathy  IV sites not inflammed.    LABS:                        8.4    7.86  )-----------( 221      ( 03 Feb 2022 06:22 )             26.4     02-03    134<L>  |  97  |  15  ----------------------------<  90  4.1   |  26  |  0.94    Ca    9.5      03 Feb 2022 06:22    TPro  8.8<H>  /  Alb  3.1<L>  /  TBili  0.2  /  DBili  x   /  AST  20  /  ALT  11  /  AlkPhos  139<H>  02-02          MICROBIOLOGY:      COVID-19 PCR: NotDetec: You can help in the fight against COVID-19. [facility] may contact  you to see if you are interested in voluntarily participating in one of  our clinical trials.  Testing is performed using polymerase chain reaction (PCR) or  transcription mediated amplification (TMA). This COVID-19 (SARS-CoV-2)  nucleic acid amplification test was validated by [facility] [employer] [facility] and is  in use under the FDA Emergency Use Authorization (EUA) for clinical labs  CLIA-certified to perform high complexity testing. Test results should be  correlated with clinical presentation, patient history, and epidemiology. (02.01.22 @ 13:13)          RECENT CULTURES:      RADIOLOGY & ADDITIONAL STUDIES:

## 2022-02-03 NOTE — PROGRESS NOTE ADULT - PROBLEM SELECTOR PLAN 2
- hx of Serratia bacteremia, blood cultures 1/14 NGTD  - currently on IV zosyn and Vanco PO for C. Diff ppx, plan to be transitioned to PO levaquin tomorrow AM prior to d/c   - ID following  - CT C/A/P 1/19 shows hypoenhancing lesion in the spleen measuring 4.0 cm.   - IR consulted, as the patient is not acutely ill at the moment with cleared Bcx, holding off on intervention for now. Risks outweigh benefits.

## 2022-02-03 NOTE — PROGRESS NOTE ADULT - PROBLEM SELECTOR PLAN 1
With splenic abscess seen on CT- likely source  Blood Cx (1/11) grew serratia- on Zosyn- d/w ID- will change to PO Levaquin to continue treatment/suppressive therapy  IR consulted - risks of draining outweigh benefits especially given bacteremia has resolved  Given multiple medical chronic issues, pt would not be a good candidate

## 2022-02-03 NOTE — PROGRESS NOTE ADULT - ASSESSMENT
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 rehab and then returned to [facility] for trach decannulation 12/2 who now presents with recurrent COVID-19 infection, initially in distributive shock. 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     He had elevated inflammatory markers which are now improving. He received monoclonal antibodies and was started on remdesivir and dexamethasone. He's afebrile with BCx from 12/18 and 12/30 NGTD. He had an episode of hypoxia noted 1/1, possibly in the setting of aspiration. He is currently saturating well on RA. He had runs of MMVT in setting of hypokalemia and being off his beta blocker which has since resolved. He currently appears well compensated from HF perspective. His LDH is stable. His LVAD is functioning well without s/s of pump malfunction.     Was noted to have positive blood culture for serratia marcescens on 1/11 and placed on Zosyn with plan for 3-4 weeks. Also had CT scan with concern of abscess but not pursuing drainage. His Hgb has overall remain stable though he is off anticoagulation/ antiplatelets. Now that he is on standing IV pain medications his abdominal pain is improving which as also helped improve his appetite. Family meeting held 1/26 to emphasize importance of nutrition and physical therapy. Patient has been accepted to [facility], plan to discharge to rehab tomorrow AM.

## 2022-02-03 NOTE — PROGRESS NOTE ADULT - PROBLEM SELECTOR PLAN 7
Plan for d/c to [facility] tomorrow, who will then transition patient to long term care.  Palliative care has signed off- Patient more compliant with medications since last palliative meeting on 1/26  Remains full code and does not want LVAD deactivated

## 2022-02-03 NOTE — PROGRESS NOTE ADULT - ASSESSMENT
66yo man with a history of VAD palcement, history of COVID infeciton in April 2020, history of a prolonged hospitalization in 2021 with recurrent sepsis, pneumonia, intubated/trach x2 cycles , chronic gall bladder disease s/p perc dawn, SMA ischemia reuiring a stent placement, cachecxia who was discharged to rehab but is presented from [facility] on 12/13 with AMS, hypotensive, tachycardic found to be COVID (+). Pt is lethargic and mumbling words required ICU admission and pressor support but recovered and is off oxygen on general cardiac floor    Recurrent GNR bacteremia/serratia:  stable on Zosyn therapy  likely splenic abscess- patient reluctant to permit drainage      COVID infection-   confirmed second bout of COVID by documented PCR testing  Ideally, would be interested in sequencing the virus to determine strain (omicron vs delta)  Received a dose of monoclonal antibodies [employer] product   Completed remdesivir therapy  Completed ten days of dexamethasone  Refuses most vaccines- but will need to wait three months  to receive COVID vaccine series post monoclonal  Will administer flu/pneumonia vaccines this admission prior to discharge if he will permit-refusing      Recurrent serratia bacteremia  blood cultures clear  serratia sensitive to Zosyn continue present therapy  plan to complete 4 weeks IV abx through 2/11 however as patient specifically interested in a rehab. plan if a bed becomes available will change to oral Levaquin 500mg/d for suppression    Likely splenic abscess 4cm collection seen on abdominal CT scan:  Likely embolic in nature  IR consult noted - risks of drainage may outweigh benefits  Patient has cleared bacteremia on current antibiotic but may recur once antibiotics are stopped  Patient voicing that he is not interested in drainage of collection  will try oral abx suppression with levaquin 500mg/d when a rehab bed is available      Prior severe C.diff infection   pre-emptive oral Vanco 125mg bid      Morris Prater MD  [phone]  After 5pm/weekends [phone]

## 2022-02-04 NOTE — PROGRESS NOTE ADULT - PROBLEM SELECTOR PLAN 1
With splenic abscess seen on CT- likely source  Blood Cx (1/11) grew serratia- s/p course of Zosyn  Change to PO Levaquin 500 mg daily to continue treatment/suppressive therapy

## 2022-02-04 NOTE — PROGRESS NOTE ADULT - ASSESSMENT
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 s/p HM2 LVAD in 9/2017 at  (due to severe PAD) with TV ring, multiple GI bleeds, thus maintained off AC, CKD 3prior COVID-19 infection in 4/2020, recurrent syncope post LVAD s/p ILR, s/p prolonged complex hospitalization from 6/14-11/16/2021 initially admitted with abdominal pain complicated by GIB, respiratory failure s/p trach, multiple infections, s/p cholecystotomy tube and SMA stent 10/2021, ultimately discharged to [facility] rehab and then returned to [facility] for trach decannulation 12/2 who now presents with recurrent COVID-19 infection, initially in distributive shock. 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     He had elevated inflammatory markers which are now improving. He received monoclonal antibodies and was started on remdesivir and dexamethasone. He's afebrile with BCx from 12/18 and 12/30 NGTD. He had an episode of hypoxia noted 1/1, possibly in the setting of aspiration. He is currently saturating well on RA. He had runs of MMVT in setting of hypokalemia and being off his beta blocker which has since resolved. He currently appears well compensated from HF perspective. His LDH is stable. His LVAD is functioning well without s/s of pump malfunction.     Was noted to have positive blood culture for serratia marcescens on 1/11 and placed on Zosyn with plan for 3-4 weeks. Also had CT scan with concern of abscess but not pursuing drainage. His Hgb has overall remain stable though he is off anticoagulation/ antiplatelets. Now that he is on standing IV pain medications his abdominal pain is improving which as also helped improve his appetite. Family meeting held 1/26 to emphasize importance of nutrition and physical therapy. Patient has been accepted to [facility], plan to discharge to rehab tomorrow AM.  Zosyn d/c and changed to Levaquin 500mg po daily for suppressive antibiotic.

## 2022-02-04 NOTE — PROGRESS NOTE ADULT - SUBJECTIVE AND OBJECTIVE BOX
Subjective: Patient seen and examined resting in bed. In good spirits.     Medications:  acetaminophen     Tablet .. 650 milliGRAM(s) Oral every 6 hours PRN  baclofen 5 milliGRAM(s) Oral every 8 hours  bisacodyl 5 milliGRAM(s) Oral every 12 hours  chlorhexidine 2% Cloths 1 Application(s) Topical <User Schedule>  cholecalciferol 1000 Unit(s) Oral daily  dextrose 50% Injectable 25 milliLiter(s) IV Push every 15 minutes  dextrose 50% Injectable 50 milliLiter(s) IV Push every 15 minutes  dronabinol 5 milliGRAM(s) Oral two times a day before meals  gabapentin Solution 125 milliGRAM(s) Oral three times a day  HYDROmorphone  Injectable 0.2 milliGRAM(s) IV Push every 8 hours  HYDROmorphone  Injectable 0.2 milliGRAM(s) IV Push every 2 hours PRN  magnesium oxide 400 milliGRAM(s) Oral three times a day with meals  melatonin 3 milliGRAM(s) Oral at bedtime  methimazole 10 milliGRAM(s) Oral daily  metoclopramide Injectable 10 milliGRAM(s) IV Push four times a day  metoprolol succinate ER 50 milliGRAM(s) Oral at bedtime  metoprolol succinate ER 25 milliGRAM(s) Oral daily  mexiletine 150 milliGRAM(s) Oral every 8 hours  mirtazapine 7.5 milliGRAM(s) Oral at bedtime  multivitamin 1 Tablet(s) Oral daily  pantoprazole    Tablet 40 milliGRAM(s) Oral every 12 hours  piperacillin/tazobactam IVPB.. 3.375 Gram(s) IV Intermittent every 8 hours- D/C'd since going to rehab, Levaquin 500 daily for suppressive antibiotics  polyethylene glycol 3350 17 Gram(s) Oral daily  senna 2 Tablet(s) Oral at bedtime  sertraline 100 milliGRAM(s) Oral daily  sodium chloride 1 Gram(s) Oral every 8 hours  sodium chloride 0.9% lock flush 3 milliLiter(s) IV Push every 8 hours  spironolactone 25 milliGRAM(s) Oral daily  vancomycin    Solution 125 milliGRAM(s) Oral every 12 hours        Vitals:  ICU Vital Signs Last 24 Hrs  T(C): 36.7 (04 Feb 2022 04:40), Max: 36.9 (03 Feb 2022 16:30)  T(F): 98.1 (04 Feb 2022 04:40), Max: 98.4 (03 Feb 2022 16:30)  HR: 97 (04 Feb 2022 04:40) (86 - 97)  BP(mean): 89 (04 Feb 2022 05:46) (76 - 89)  RR: 18 (04 Feb 2022 04:40) (16 - 18)  SpO2: 99% (04 Feb 2022 04:40) (98% - 100%)      I&O's Summary  I&O's Summary    03 Feb 2022 07:01  -  04 Feb 2022 07:00  --------------------------------------------------------  IN: 605 mL / OUT: 730 mL / NET: -125 mL    04 Feb 2022 07:01  -  04 Feb 2022 12:20  --------------------------------------------------------  IN: 0 mL / OUT: 200 mL / NET: -200 mL        Physical Exam  General: No distress. Comfortable.  HEENT: EOM intact.  Neck: Neck supple. JVP not elevated. No masses  Chest: Clear to auscultation bilaterally  CV: +VAD hum  Abdomen: Soft, non-distended, non-tender, +bilary drain   Neurology: Alert and oriented times three    LVAD Interrogation  VAD TYPE HM 2  Speed 9200  Flow 4.6  Power 5.0  PI  5.6  Assessment of driveline exit site: driveline dressing c/d/i  Programming changes: no changes made    Labs:                        8.4    7.86  )-----------( 221      ( 03 Feb 2022 06:22 )             26.4     02-04    131<L>  |  96  |  16  ----------------------------<  121<H>  4.3   |  25  |  0.93    Ca    9.5      04 Feb 2022 07:13  Phos  2.9     02-04  Mg     1.8     02-04

## 2022-02-04 NOTE — PROGRESS NOTE ADULT - SUBJECTIVE AND OBJECTIVE BOX
Mich Krueger MD  Pager [phone]  Spectra [phone]  Cell Phone [phone]    Patient is a 66y old  Male who presents with a chief complaint of COVID (+) with AMS and hypotension (03 Feb 2022 16:09)        SUBJECTIVE / OVERNIGHT EVENTS: No new events  TELEMETRY: SR 70-90's, 13 bts WCT      MEDICATIONS  (STANDING):  baclofen 5 milliGRAM(s) Oral every 8 hours  bisacodyl 5 milliGRAM(s) Oral every 12 hours  chlorhexidine 2% Cloths 1 Application(s) Topical <User Schedule>  cholecalciferol 1000 Unit(s) Oral daily  dextrose 50% Injectable 25 milliLiter(s) IV Push every 15 minutes  dextrose 50% Injectable 50 milliLiter(s) IV Push every 15 minutes  dronabinol 5 milliGRAM(s) Oral two times a day before meals  gabapentin Solution 125 milliGRAM(s) Oral three times a day  magnesium oxide 400 milliGRAM(s) Oral three times a day with meals  melatonin 3 milliGRAM(s) Oral at bedtime  methimazole 10 milliGRAM(s) Oral daily  metoclopramide 10 milliGRAM(s) Oral four times a day  metoprolol succinate ER 50 milliGRAM(s) Oral at bedtime  metoprolol succinate ER 25 milliGRAM(s) Oral daily  mexiletine 150 milliGRAM(s) Oral every 8 hours  mirtazapine 7.5 milliGRAM(s) Oral at bedtime  multivitamin 1 Tablet(s) Oral daily  pantoprazole    Tablet 40 milliGRAM(s) Oral every 12 hours  piperacillin/tazobactam IVPB.. 3.375 Gram(s) IV Intermittent every 8 hours  polyethylene glycol 3350 17 Gram(s) Oral daily  senna 2 Tablet(s) Oral at bedtime  sertraline 100 milliGRAM(s) Oral daily  sodium chloride 1 Gram(s) Oral every 8 hours  sodium chloride 0.9% lock flush 3 milliLiter(s) IV Push every 8 hours  spironolactone 25 milliGRAM(s) Oral daily  vancomycin    Solution 125 milliGRAM(s) Oral every 12 hours    MEDICATIONS  (PRN):  acetaminophen     Tablet .. 650 milliGRAM(s) Oral every 6 hours PRN Temp greater or equal to 38C (100.4F), Mild Pain (1 - 3), Moderate Pain (4 - 6)      Vital Signs Last 24 Hrs  T(C): 36.7 (04 Feb 2022 04:40), Max: 36.9 (03 Feb 2022 16:30)  T(F): 98.1 (04 Feb 2022 04:40), Max: 98.4 (03 Feb 2022 16:30)  HR: 97 (04 Feb 2022 04:40) (86 - 97)  BP: --  BP(mean): 89 (04 Feb 2022 05:46) (76 - 89)  RR: 18 (04 Feb 2022 04:40) (16 - 18)  SpO2: 99% (04 Feb 2022 04:40) (98% - 100%)  CAPILLARY BLOOD GLUCOSE        I&O's Summary    03 Feb 2022 07:01  -  04 Feb 2022 07:00  --------------------------------------------------------  IN: 605 mL / OUT: 730 mL / NET: -125 mL          PHYSICAL EXAM  GENERAL: NAD, cachetic  HEAD:  Atraumatic, normocephalic  EYES: EOMI, PERRLA, conjunctiva and sclera clear  CHEST/LUNG: Clear to auscultation bilaterally; no wheezes  HEART: + LVAD hum  ABDOMEN: Soft, tender to palpation in LUQ, nondistended; bowel sounds present; +R sided driveline; +R sided cholecystostomy tube with biliary drainage  EXTREMITIES:  2+ peripheral pulses; no clubbing, cyanosis, or edema  PSYCH: AAOx3    LABS:                        8.4    7.86  )-----------( 221      ( 03 Feb 2022 06:22 )             26.4     02-04    131<L>  |  96  |  16  ----------------------------<  121<H>  4.3   |  25  |  0.93    Ca    9.5      04 Feb 2022 07:13  Phos  2.9     02-04  Mg     1.8     02-04                  RADIOLOGY & ADDITIONAL TESTS:    Imaging Personally Reviewed:  Consultant(s) Notes Reviewed:    Care Discussed with Consultants/Other Providers:

## 2022-02-04 NOTE — PROGRESS NOTE ADULT - REASON FOR ADMISSION
COVID (+) with AMS and hypotension

## 2022-02-04 NOTE — PROGRESS NOTE ADULT - NUTRITIONAL ASSESSMENT
This patient has been assessed with a concern for Malnutrition and has been determined to have a diagnosis/diagnoses of Severe protein-calorie malnutrition and Underweight (BMI < 19).    This patient is being managed with:   Diet Consistent Carbohydrate w/Evening Snack-  Supplement Feeding Modality:  Oral  Glucerna Shake Cans or Servings Per Day:  6       Frequency:  Daily  Entered: Jan 14 2022  8:13AM    
This patient has been assessed with a concern for Malnutrition and has been determined to have a diagnosis/diagnoses of Severe protein-calorie malnutrition and Underweight (BMI < 19).    This patient is being managed with:   Diet Regular-  Supplement Feeding Modality:  Oral  Ensure Enlive Cans or Servings Per Day:  1       Frequency:  Three Times a day  Entered: Jan 26 2022 11:37AM    
This patient has been assessed with a concern for Malnutrition and has been determined to have a diagnosis/diagnoses of Severe protein-calorie malnutrition and Underweight (BMI < 19).    This patient is being managed with:   Diet Regular-  Supplement Feeding Modality:  Oral  Glucerna Shake Cans or Servings Per Day:  2       Frequency:  Daily  Entered: Dec 20 2021  3:12PM    
This patient has been assessed with a concern for Malnutrition and has been determined to have a diagnosis/diagnoses of Severe protein-calorie malnutrition and Underweight (BMI < 19).    This patient is being managed with:   Diet Consistent Carbohydrate w/Evening Snack-  Supplement Feeding Modality:  Oral  Glucerna Shake Cans or Servings Per Day:  6       Frequency:  Daily  Entered: Jan 14 2022  8:13AM    
This patient has been assessed with a concern for Malnutrition and has been determined to have a diagnosis/diagnoses of Severe protein-calorie malnutrition and Underweight (BMI < 19).    This patient is being managed with:   Diet DASH/TLC-  Sodium & Cholesterol Restricted  Entered: Dec 13 2021  5:13PM    
This patient has been assessed with a concern for Malnutrition and has been determined to have a diagnosis/diagnoses of Severe protein-calorie malnutrition and Underweight (BMI < 19).    This patient is being managed with:   Diet Regular-  Supplement Feeding Modality:  Oral  Glucerna Shake Cans or Servings Per Day:  2       Frequency:  Daily  Entered: Dec 20 2021  3:12PM    
This patient has been assessed with a concern for Malnutrition and has been determined to have a diagnosis/diagnoses of Severe protein-calorie malnutrition and Underweight (BMI < 19).    This patient is being managed with:   Diet Consistent Carbohydrate w/Evening Snack-  Supplement Feeding Modality:  Oral  Glucerna Shake Cans or Servings Per Day:  2       Frequency:  Daily  Entered: Jan 5 2022  8:16AM    
This patient has been assessed with a concern for Malnutrition and has been determined to have a diagnosis/diagnoses of Severe protein-calorie malnutrition and Underweight (BMI < 19).    This patient is being managed with:   Diet Consistent Carbohydrate w/Evening Snack-  Supplement Feeding Modality:  Oral  Glucerna Shake Cans or Servings Per Day:  6       Frequency:  Daily  Entered: Jan 14 2022  8:13AM    
This patient has been assessed with a concern for Malnutrition and has been determined to have a diagnosis/diagnoses of Severe protein-calorie malnutrition and Underweight (BMI < 19).    This patient is being managed with:   Diet Pureed-  DASH/TLC {Sodium & Cholesterol Restricted} (DASH)  Mildly Thick Liquids (MILDTHICKLIQS)  Entered: Jan 24 2022 11:10AM    
This patient has been assessed with a concern for Malnutrition and has been determined to have a diagnosis/diagnoses of Severe protein-calorie malnutrition and Underweight (BMI < 19).    This patient is being managed with:   Diet Regular-  Supplement Feeding Modality:  Oral  Glucerna Shake Cans or Servings Per Day:  2       Frequency:  Daily  Entered: Dec 20 2021  3:12PM    
This patient has been assessed with a concern for Malnutrition and has been determined to have a diagnosis/diagnoses of Severe protein-calorie malnutrition and Underweight (BMI < 19).    This patient is being managed with:   Diet DASH/TLC-  Sodium & Cholesterol Restricted  Entered: Dec 13 2021  5:13PM    
This patient has been assessed with a concern for Malnutrition and has been determined to have a diagnosis/diagnoses of Severe protein-calorie malnutrition and Underweight (BMI < 19).    This patient is being managed with:   Diet Pureed-  DASH/TLC {Sodium & Cholesterol Restricted} (DASH)  Mildly Thick Liquids (MILDTHICKLIQS)  Entered: Jan 24 2022 11:10AM    
This patient has been assessed with a concern for Malnutrition and has been determined to have a diagnosis/diagnoses of Severe protein-calorie malnutrition and Underweight (BMI < 19).    This patient is being managed with:   Diet Consistent Carbohydrate w/Evening Snack-  Supplement Feeding Modality:  Oral  Glucerna Shake Cans or Servings Per Day:  2       Frequency:  Daily  Entered: Jan 5 2022  8:16AM    
This patient has been assessed with a concern for Malnutrition and has been determined to have a diagnosis/diagnoses of Severe protein-calorie malnutrition and Underweight (BMI < 19).    This patient is being managed with:   Diet Regular-  Supplement Feeding Modality:  Oral  Ensure Enlive Cans or Servings Per Day:  1       Frequency:  Three Times a day  Entered: Jan 26 2022 11:37AM    
This patient has been assessed with a concern for Malnutrition and has been determined to have a diagnosis/diagnoses of Severe protein-calorie malnutrition and Underweight (BMI < 19).    This patient is being managed with:   Diet Consistent Carbohydrate w/Evening Snack-  Supplement Feeding Modality:  Oral  Glucerna Shake Cans or Servings Per Day:  2       Frequency:  Daily  Entered: Jan 5 2022  8:16AM    
This patient has been assessed with a concern for Malnutrition and has been determined to have a diagnosis/diagnoses of Severe protein-calorie malnutrition and Underweight (BMI < 19).    This patient is being managed with:   Diet Consistent Carbohydrate w/Evening Snack-  Supplement Feeding Modality:  Oral  Glucerna Shake Cans or Servings Per Day:  2       Frequency:  Daily  Entered: Jan 5 2022  8:16AM    
This patient has been assessed with a concern for Malnutrition and has been determined to have a diagnosis/diagnoses of Severe protein-calorie malnutrition and Underweight (BMI < 19).    This patient is being managed with:   Diet Pureed-  DASH/TLC {Sodium & Cholesterol Restricted} (DASH)  Mildly Thick Liquids (MILDTHICKLIQS)  Entered: Dec 18 2021  6:11PM    
This patient has been assessed with a concern for Malnutrition and has been determined to have a diagnosis/diagnoses of Severe protein-calorie malnutrition and Underweight (BMI < 19).    This patient is being managed with:   Diet Regular-  Supplement Feeding Modality:  Oral  Ensure Enlive Cans or Servings Per Day:  1       Frequency:  Three Times a day  Entered: Jan 26 2022 11:37AM    
This patient has been assessed with a concern for Malnutrition and has been determined to have a diagnosis/diagnoses of Severe protein-calorie malnutrition and Underweight (BMI < 19).    This patient is being managed with:   Diet Regular-  Supplement Feeding Modality:  Oral  Glucerna Shake Cans or Servings Per Day:  2       Frequency:  Daily  Entered: Dec 20 2021  3:12PM    
This patient has been assessed with a concern for Malnutrition and has been determined to have a diagnosis/diagnoses of Severe protein-calorie malnutrition and Underweight (BMI < 19).    This patient is being managed with:   Diet Consistent Carbohydrate w/Evening Snack-  Supplement Feeding Modality:  Oral  Glucerna Shake Cans or Servings Per Day:  2       Frequency:  Daily  Entered: Jan 5 2022  8:16AM    
This patient has been assessed with a concern for Malnutrition and has been determined to have a diagnosis/diagnoses of Severe protein-calorie malnutrition and Underweight (BMI < 19).    This patient is being managed with:   Diet Regular-  Supplement Feeding Modality:  Oral  Glucerna Shake Cans or Servings Per Day:  2       Frequency:  Daily  Entered: Dec 20 2021  3:12PM    
This patient has been assessed with a concern for Malnutrition and has been determined to have a diagnosis/diagnoses of Severe protein-calorie malnutrition and Underweight (BMI < 19).    This patient is being managed with:   Diet Regular-  Entered: Norman  3 2022  6:39PM    The following pending diet order is being considered for treatment of Severe protein-calorie malnutrition and Underweight (BMI < 19):  Diet Consistent Carbohydrate w/Evening Snack-  Minced and Moist (MINCEDMOIST)  Supplement Feeding Modality:  Oral  Glucerna Shake Cans or Servings Per Day:  2       Frequency:  Daily  Entered: Norman  3 2022 12:22PM  
This patient has been assessed with a concern for Malnutrition and has been determined to have a diagnosis/diagnoses of Severe protein-calorie malnutrition and Underweight (BMI < 19).    This patient is being managed with:   Diet Regular-  Supplement Feeding Modality:  Oral  Ensure Enlive Cans or Servings Per Day:  1       Frequency:  Three Times a day  Entered: Jan 26 2022 11:37AM    
This patient has been assessed with a concern for Malnutrition and has been determined to have a diagnosis/diagnoses of Severe protein-calorie malnutrition and Underweight (BMI < 19).    This patient is being managed with:   Diet Consistent Carbohydrate w/Evening Snack-  Supplement Feeding Modality:  Oral  Glucerna Shake Cans or Servings Per Day:  2       Frequency:  Daily  Entered: Jan 5 2022  8:16AM    
This patient has been assessed with a concern for Malnutrition and has been determined to have a diagnosis/diagnoses of Severe protein-calorie malnutrition and Underweight (BMI < 19).    This patient is being managed with:   Diet Regular-  Supplement Feeding Modality:  Oral  Ensure Enlive Cans or Servings Per Day:  1       Frequency:  Three Times a day  Entered: Jan 26 2022 11:37AM    
This patient has been assessed with a concern for Malnutrition and has been determined to have a diagnosis/diagnoses of Severe protein-calorie malnutrition and Underweight (BMI < 19).    This patient is being managed with:   Diet Pureed-  DASH/TLC {Sodium & Cholesterol Restricted} (DASH)  Mildly Thick Liquids (MILDTHICKLIQS)  Entered: Jan 24 2022 11:10AM    
This patient has been assessed with a concern for Malnutrition and has been determined to have a diagnosis/diagnoses of Severe protein-calorie malnutrition and Underweight (BMI < 19).    This patient is being managed with:   Diet Regular-  Entered: Norman  3 2022  6:39PM    The following pending diet order is being considered for treatment of Severe protein-calorie malnutrition and Underweight (BMI < 19):  Diet Consistent Carbohydrate w/Evening Snack-  Minced and Moist (MINCEDMOIST)  Supplement Feeding Modality:  Oral  Glucerna Shake Cans or Servings Per Day:  2       Frequency:  Daily  Entered: Norman  3 2022 12:22PM  
This patient has been assessed with a concern for Malnutrition and has been determined to have a diagnosis/diagnoses of Severe protein-calorie malnutrition and Underweight (BMI < 19).    This patient is being managed with:   Diet Consistent Carbohydrate w/Evening Snack-  Supplement Feeding Modality:  Oral  Glucerna Shake Cans or Servings Per Day:  2       Frequency:  Daily  Entered: Jan 5 2022  8:16AM    
This patient has been assessed with a concern for Malnutrition and has been determined to have a diagnosis/diagnoses of Severe protein-calorie malnutrition and Underweight (BMI < 19).    This patient is being managed with:   Diet Consistent Carbohydrate w/Evening Snack-  Supplement Feeding Modality:  Oral  Glucerna Shake Cans or Servings Per Day:  2       Frequency:  Daily  Entered: Jan 5 2022  8:16AM    
This patient has been assessed with a concern for Malnutrition and has been determined to have a diagnosis/diagnoses of Severe protein-calorie malnutrition and Underweight (BMI < 19).    This patient is being managed with:   Diet Regular-  Supplement Feeding Modality:  Oral  Ensure Enlive Cans or Servings Per Day:  1       Frequency:  Three Times a day  Entered: Jan 26 2022 11:37AM    
This patient has been assessed with a concern for Malnutrition and has been determined to have a diagnosis/diagnoses of Underweight (BMI < 19) and Severe protein-calorie malnutrition.    This patient is being managed with:   Diet Consistent Carbohydrate w/Evening Snack-  Supplement Feeding Modality:  Oral  Glucerna Shake Cans or Servings Per Day:  6       Frequency:  Daily  Entered: Jan 14 2022  8:13AM    
This patient has been assessed with a concern for Malnutrition and has been determined to have a diagnosis/diagnoses of Severe protein-calorie malnutrition and Underweight (BMI < 19).    This patient is being managed with:   Diet Regular-  Entered: Norman  3 2022  6:39PM    The following pending diet order is being considered for treatment of Severe protein-calorie malnutrition and Underweight (BMI < 19):  Diet Consistent Carbohydrate w/Evening Snack-  Minced and Moist (MINCEDMOIST)  Supplement Feeding Modality:  Oral  Glucerna Shake Cans or Servings Per Day:  2       Frequency:  Daily  Entered: Norman  3 2022 12:22PM  
This patient has been assessed with a concern for Malnutrition and has been determined to have a diagnosis/diagnoses of Severe protein-calorie malnutrition and Underweight (BMI < 19).    This patient is being managed with:   Diet Regular-  Supplement Feeding Modality:  Oral  Ensure Enlive Cans or Servings Per Day:  1       Frequency:  Three Times a day  Entered: Jan 26 2022 11:37AM    
This patient has been assessed with a concern for Malnutrition and has been determined to have a diagnosis/diagnoses of Severe protein-calorie malnutrition and Underweight (BMI < 19).    This patient is being managed with:   Diet Consistent Carbohydrate w/Evening Snack-  Supplement Feeding Modality:  Oral  Glucerna Shake Cans or Servings Per Day:  6       Frequency:  Daily  Entered: Jan 14 2022  8:13AM    
This patient has been assessed with a concern for Malnutrition and has been determined to have a diagnosis/diagnoses of Severe protein-calorie malnutrition and Underweight (BMI < 19).    This patient is being managed with:   Diet Minced and Moist-  Consistent Carbohydrate {Evening Snack} (CSTCHOSN)  Entered: Jan 1 2022  7:46PM    
This patient has been assessed with a concern for Malnutrition and has been determined to have a diagnosis/diagnoses of Severe protein-calorie malnutrition and Underweight (BMI < 19).    This patient is being managed with:   Diet Consistent Carbohydrate w/Evening Snack-  Supplement Feeding Modality:  Oral  Glucerna Shake Cans or Servings Per Day:  2       Frequency:  Daily  Entered: Jan 5 2022  8:16AM    
This patient has been assessed with a concern for Malnutrition and has been determined to have a diagnosis/diagnoses of Severe protein-calorie malnutrition and Underweight (BMI < 19).    This patient is being managed with:   Diet Regular-  Supplement Feeding Modality:  Oral  Glucerna Shake Cans or Servings Per Day:  2       Frequency:  Daily  Entered: Dec 20 2021  3:12PM    
This patient has been assessed with a concern for Malnutrition and has been determined to have a diagnosis/diagnoses of Severe protein-calorie malnutrition and Underweight (BMI < 19).    This patient is being managed with:   Diet Regular-  Supplement Feeding Modality:  Oral  Glucerna Shake Cans or Servings Per Day:  2       Frequency:  Daily  Entered: Dec 20 2021  3:12PM    
This patient has been assessed with a concern for Malnutrition and has been determined to have a diagnosis/diagnoses of Severe protein-calorie malnutrition and Underweight (BMI < 19).    This patient is being managed with:   Diet Consistent Carbohydrate w/Evening Snack-  Supplement Feeding Modality:  Oral  Glucerna Shake Cans or Servings Per Day:  2       Frequency:  Daily  Entered: Jan 5 2022  8:16AM    
This patient has been assessed with a concern for Malnutrition and has been determined to have a diagnosis/diagnoses of Severe protein-calorie malnutrition and Underweight (BMI < 19).    This patient is being managed with:   Diet Consistent Carbohydrate w/Evening Snack-  Supplement Feeding Modality:  Oral  Glucerna Shake Cans or Servings Per Day:  6       Frequency:  Daily  Entered: Jan 14 2022  8:13AM    
This patient has been assessed with a concern for Malnutrition and has been determined to have a diagnosis/diagnoses of Severe protein-calorie malnutrition and Underweight (BMI < 19).    This patient is being managed with:   Diet Consistent Carbohydrate w/Evening Snack-  Supplement Feeding Modality:  Oral  Glucerna Shake Cans or Servings Per Day:  2       Frequency:  Daily  Entered: Jan 5 2022  8:16AM    
This patient has been assessed with a concern for Malnutrition and has been determined to have a diagnosis/diagnoses of Severe protein-calorie malnutrition and Underweight (BMI < 19).    This patient is being managed with:   Diet Regular-  Supplement Feeding Modality:  Oral  Ensure Enlive Cans or Servings Per Day:  1       Frequency:  Three Times a day  Entered: Jan 26 2022 11:37AM    
This patient has been assessed with a concern for Malnutrition and has been determined to have a diagnosis/diagnoses of Severe protein-calorie malnutrition and Underweight (BMI < 19).    This patient is being managed with:   Diet Regular-  Supplement Feeding Modality:  Oral  Glucerna Shake Cans or Servings Per Day:  2       Frequency:  Daily  Entered: Dec 20 2021  3:12PM    
This patient has been assessed with a concern for Malnutrition and has been determined to have a diagnosis/diagnoses of Severe protein-calorie malnutrition and Underweight (BMI < 19).    This patient is being managed with:   Diet Consistent Carbohydrate w/Evening Snack-  Supplement Feeding Modality:  Oral  Glucerna Shake Cans or Servings Per Day:  2       Frequency:  Daily  Entered: Jan 5 2022  8:16AM    
This patient has been assessed with a concern for Malnutrition and has been determined to have a diagnosis/diagnoses of Severe protein-calorie malnutrition and Underweight (BMI < 19).    This patient is being managed with:   Diet Consistent Carbohydrate w/Evening Snack-  Supplement Feeding Modality:  Oral  Glucerna Shake Cans or Servings Per Day:  6       Frequency:  Daily  Entered: Jan 14 2022  8:13AM    
This patient has been assessed with a concern for Malnutrition and has been determined to have a diagnosis/diagnoses of Severe protein-calorie malnutrition and Underweight (BMI < 19).    This patient is being managed with:   Diet Consistent Carbohydrate w/Evening Snack-  Supplement Feeding Modality:  Oral  Glucerna Shake Cans or Servings Per Day:  6       Frequency:  Daily  Entered: Jan 14 2022  8:13AM    
This patient has been assessed with a concern for Malnutrition and has been determined to have a diagnosis/diagnoses of Severe protein-calorie malnutrition and Underweight (BMI < 19).    This patient is being managed with:   Diet Regular-  Supplement Feeding Modality:  Oral  Ensure Enlive Cans or Servings Per Day:  1       Frequency:  Three Times a day  Entered: Jan 26 2022 11:37AM    
This patient has been assessed with a concern for Malnutrition and has been determined to have a diagnosis/diagnoses of Severe protein-calorie malnutrition and Underweight (BMI < 19).    This patient is being managed with:   Diet Consistent Carbohydrate w/Evening Snack-  Supplement Feeding Modality:  Oral  Glucerna Shake Cans or Servings Per Day:  2       Frequency:  Daily  Entered: Jan 5 2022  8:16AM    
This patient has been assessed with a concern for Malnutrition and has been determined to have a diagnosis/diagnoses of Severe protein-calorie malnutrition and Underweight (BMI < 19).    This patient is being managed with:   Diet Pureed-  DASH/TLC {Sodium & Cholesterol Restricted} (DASH)  Mildly Thick Liquids (MILDTHICKLIQS)  Entered: Dec 18 2021  6:11PM    
This patient has been assessed with a concern for Malnutrition and has been determined to have a diagnosis/diagnoses of Severe protein-calorie malnutrition and Underweight (BMI < 19).    This patient is being managed with:   Diet Regular-  Supplement Feeding Modality:  Oral  Glucerna Shake Cans or Servings Per Day:  2       Frequency:  Daily  Entered: Dec 20 2021  3:12PM    
This patient has been assessed with a concern for Malnutrition and has been determined to have a diagnosis/diagnoses of Severe protein-calorie malnutrition and Underweight (BMI < 19).    This patient is being managed with:   Diet Regular-  Supplement Feeding Modality:  Oral  Glucerna Shake Cans or Servings Per Day:  2       Frequency:  Daily  Entered: Dec 20 2021  3:12PM    
This patient has been assessed with a concern for Malnutrition and has been determined to have a diagnosis/diagnoses of Severe protein-calorie malnutrition and Underweight (BMI < 19).    This patient is being managed with:   Diet Consistent Carbohydrate w/Evening Snack-  Supplement Feeding Modality:  Oral  Glucerna Shake Cans or Servings Per Day:  2       Frequency:  Daily  Entered: Jan 5 2022  8:16AM    
This patient has been assessed with a concern for Malnutrition and has been determined to have a diagnosis/diagnoses of Severe protein-calorie malnutrition and Underweight (BMI < 19).    This patient is being managed with:   Diet Minced and Moist-  Consistent Carbohydrate {Evening Snack} (CSTCHOSN)  Entered: Jan 1 2022  7:46PM    
This patient has been assessed with a concern for Malnutrition and has been determined to have a diagnosis/diagnoses of Severe protein-calorie malnutrition and Underweight (BMI < 19).    This patient is being managed with:   Diet Consistent Carbohydrate w/Evening Snack-  Supplement Feeding Modality:  Oral  Glucerna Shake Cans or Servings Per Day:  6       Frequency:  Daily  Entered: Jan 14 2022  8:13AM    
This patient has been assessed with a concern for Malnutrition and has been determined to have a diagnosis/diagnoses of Severe protein-calorie malnutrition and Underweight (BMI < 19).    This patient is being managed with:   Diet Consistent Carbohydrate w/Evening Snack-  Supplement Feeding Modality:  Oral  Glucerna Shake Cans or Servings Per Day:  6       Frequency:  Daily  Entered: Jan 14 2022  8:13AM    
This patient has been assessed with a concern for Malnutrition and has been determined to have a diagnosis/diagnoses of Severe protein-calorie malnutrition and Underweight (BMI < 19).    This patient is being managed with:   Diet Consistent Carbohydrate w/Evening Snack-  Supplement Feeding Modality:  Oral  Glucerna Shake Cans or Servings Per Day:  2       Frequency:  Daily  Entered: Jan 5 2022  8:16AM

## 2022-02-04 NOTE — PROGRESS NOTE ADULT - PROBLEM SELECTOR PLAN 1
- s/p controller change on 12/16   -  on 1/30; Weekly LDH   - Off aspirin due to drop in Hgb. will resume when feasible   - Off coumadin due to persistent recurrent GI bleeding.

## 2022-02-04 NOTE — DISCHARGE NOTE NURSING/CASE MANAGEMENT/SOCIAL WORK - NSDCPEFALRISK_GEN_ALL_CORE
For information on Fall & Injury Prevention, visit: [email or web address] OR  [email or web address] OR  https://www.cdc.gov/steadi/patient.html

## 2022-02-04 NOTE — PROGRESS NOTE ADULT - PROVIDER SPECIALTY LIST ADULT
Heart Failure
Infectious Disease
Infectious Disease
Psychology
CT Surgery
Critical Care
Heart Failure
Hospitalist
Infectious Disease
Internal Medicine
Psychology
Critical Care
Heart Failure
Hospitalist
Hospitalist
Infectious Disease
Internal Medicine
Palliative Care
Heart Failure
Infectious Disease
Internal Medicine
Psychology
Psychology
Heart Failure
Hospitalist
Heart Failure
Heart Failure
Internal Medicine
Palliative Care
Heart Failure
Internal Medicine
Palliative Care
Heart Failure
Internal Medicine
Internal Medicine
Palliative Care
Heart Failure
Hospitalist
Hospitalist
Internal Medicine
Hospitalist
Hospitalist
Heart Failure
Internal Medicine
Heart Failure
Heart Failure
Hospitalist
Internal Medicine
Palliative Care
Heart Failure
Palliative Care
Hospitalist
Hospitalist
Internal Medicine
Hospitalist
Internal Medicine
Hospitalist

## 2022-02-04 NOTE — DISCHARGE NOTE NURSING/CASE MANAGEMENT/SOCIAL WORK - NSDCFUADDAPPT_GEN_ALL_CORE_FT
Please be sure to follow-up with your PCP in 1 to 2 weeks to monitor the progression of your symptoms. If you do not have a primary care physician, please call your insurance company to inquire about locating a primary care provider close to you, or call [facility] Physician [facility] at [phone].

## 2022-02-04 NOTE — PROGRESS NOTE ADULT - ASSESSMENT
65M PMH LVAD, recurrent sepsis, pneumonia, intubated/trach x2 cycles, chronic gall bladder disease s/p percutaneous cholecystectomy, SMA ischemia s/p stent, cachexia who presented from [facility] on 12/13 with septic shock 2/2 COVID (+) S/P pressor support now titrated off and downgraded to the floor. Hosp course c/b serratia bacteremia treated with IV Zosyn with CT finding of splenic abscess medically managing.

## 2022-02-04 NOTE — PROGRESS NOTE ADULT - PROBLEM SELECTOR PLAN 7
Plan for d/c to [facility] today with plan to then transition patient to long term care.  Palliative care has signed off- Patient more compliant with medications since last palliative meeting on 1/26  Remains full code and does not want LVAD deactivated  50 minutes spent discharging the patient

## 2022-02-04 NOTE — DISCHARGE NOTE NURSING/CASE MANAGEMENT/SOCIAL WORK - PATIENT PORTAL LINK FT
You can access the FollowMyHealth Patient Portal offered by [facility] by registering at the following website: [email or web address]. By joining [facility]’s FollowMyHealth portal, you will also be able to view your health information using other applications (apps) compatible with our system.

## 2022-02-04 NOTE — PROGRESS NOTE ADULT - PROBLEM SELECTOR PLAN 2
- hx of Serratia bacteremia, blood cultures 1/14 NGTD  - transitioned to PO levaquin tomorrow AM prior to d/c   - ID following  - CT C/A/P 1/19 shows hypoenhancing lesion in the spleen measuring 4.0 cm.

## 2022-02-04 NOTE — PROGRESS NOTE ADULT - PROBLEM SELECTOR PLAN 7
- now that he is on standing IV pain medications his abdominal pain/appetite are overall improving  - nutrition following   - of note he has failed previous calorie count, he has started to increase his over all intake

## 2022-02-27 NOTE — PATIENT PROFILE ADULT - FALL HARM RISK - HARM RISK INTERVENTIONS

## 2022-02-27 NOTE — H&P ADULT - NSHPPHYSICALEXAM_GEN_ALL_CORE
PHYSICAL EXAM:    Constitutional: Patient laying in bed, NAD  Head: Atraumatic, normocephalic  Eyes: No scleral icterus. PERRLA. EOMI  ENMT: Moist mucous membrane. Uvula midline  Neck: Supple, No JVD, + trach stoma site with purulent drainage   Respiratory: CTA B/L. No wheezes, rales or rhonchi   Cardiovascular: S1/S2. No murmurs, rubs, or gallops. +LVAD   Gastrointestinal: Nondistended, BSx4, soft, nontender.  Extremities: Moves all extremities. Warm, no edema, nontender  Vascular: Dopplerable MAP    Neurological: AMS, AAOx0, not answering verbal questions, follows commands  Skin: DTI L heel

## 2022-02-27 NOTE — H&P ADULT - NSHPLABSRESULTS_GEN_ALL_CORE
I have personally reviewed all labs and imaging.                         7.4    26.98 )-----------( 142      ( 2022 23:12 )             23.8           142  |  109<H>  |  24<H>  ----------------------------<  135<H>  3.6   |  20<L>  |  1.31<H>    Ca    9.2      2022 23:13  Phos  3.0       Mg     2.1         TPro  8.0  /  Alb  2.6<L>  /  TBili  0.6  /  DBili  x   /  AST  205<H>  /  ALT  71<H>  /  AlkPhos  166<H>            Phosphorus Level, Serum: 3.0 mg/dL (22 @ 23:13)  Magnesium, Serum: 2.1 mg/dL (22 @ 23:13)      ABG - ( 2022 00:49 )  pH, Arterial: 7.39  pH, Blood: x     /  pCO2: 36    /  pO2: 186   / HCO3: 22    / Base Excess: -2.9  /  SaO2: 100.0               Urinalysis Basic - ( 2022 23:14 )    Color: Yellow / Appearance: Clear / S.040 / pH: x  Gluc: x / Ketone: see note  / Bili: see note / Urobili: see note   Blood: x / Protein: see note / Nitrite: see note   Leuk Esterase: see note / RBC: x / WBC x   Sq Epi: x / Non Sq Epi: x / Bacteria: x        PT/INR - ( 2022 23:13 )   PT: 16.1 sec;   INR: 1.34 ratio             Lactate Trend

## 2022-02-27 NOTE — H&P ADULT - HISTORY OF PRESENT ILLNESS
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 s/p HM2 LVAD in 9/2017 at  (due to severe PAD) with TV ring, multiple GI bleeds, thus maintained off AC, CKD 3prior COVID-19 infection in 4/2020, recurrent syncope post LVAD s/p ILR, s/p prolonged complex hospitalization from 6/14-11/16/2021 initially admitted with abdominal pain complicated by GIB, respiratory failure s/p trach, multiple infections, s/p cholecystotomy tube and SMA stent 10/2021, ultimately discharged to [facility] rehab and then returned to [facility] for trach decannulation 12/2, readmitted in 2/2022 with recurrent COVID-19 infection, initially in distributive shock. Blood cultures were also positive for serratia marcescens which he has had in the past.     Pt was transferred to [facility] CICU from [facility]. He was sent to their ED from a rehab center due to AMS and tachycardia. At [facility], they noted a WBC greater than 20, tachycardia and hypercarbia on ABG. Pt was placed on BiPAP and transferred to [facility] CICU for concern for sepsis vs septic shock.

## 2022-02-27 NOTE — PATIENT PROFILE ADULT - FLU SEASON?
Patient had early morning awakening for a time check  Patient had no behavioral issues  Remained calm and cooperative  Patient was able to fall back to sleep without difficulty  Will continue to monitor safety and behaviors every 7 minutes  Yes...
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
## 2022-02-27 NOTE — H&P ADULT - ASSESSMENT
====================ASSESSMENT ==============  65M PMH LVAD, recurrent serratia bacteremia, pneumonia, intubated/trach x2 cycles, chronic gall bladder disease s/p percutaneous cholecystectomy, SMA ischemia s/p stent, cachexia who presented from [facility] for septic shock    Plan:  ====================== NEUROLOGY=====================  AMS  - per nursing home staff pt has been altered for one week  - pt AAOx0, not verbally responding to staff   - pt following commands  - continue to monitor neuro status    ==================== RESPIRATORY======================  Hypercarbia  - Pt hypercarbic at OSH, placed on BiPAP   - Weaned BiPAP after admission to [facility] with ABG stable  - continue to monitor respiratory status   - wean NC as tolerated    Hx trach w/ stoma  - Pt trach x2 cycles in past  - most recently in setting of COVID PNA in 02/2022  - Pt decannulated after admission  - purulent drainage in stoma site, cultured pending results    ====================CARDIOVASCULAR==================  ACC/AHA stage D systolic heart failure s/p LVAD  -   ===================HEMATOLOGIC/ONC ===================    ===================== RENAL =========================  Continue monitoring urine output    ==================== GASTROINTESTINAL===================  albumin human  5% IVPB 250 milliLiter(s) IV Intermittent once  potassium chloride  20 mEq/100 mL IVPB 20 milliEquivalent(s) IV Intermittent every 2 hours    =======================    ENDOCRINE  =====================  methimazole 10 milliGRAM(s) Oral daily    ========================INFECTIOUS DISEASE================  piperacillin/tazobactam IVPB.. 3.375 Gram(s) IV Intermittent every 8 hours      ========================PROPHYLACTIC MEASURE================  DVT  GI Protonix  Graft patency   Beta blocker      Patient requires continuous monitoring with bedside rhythm monitoring, arterial line, pulse oximetry, ventilator monitoring and intermittent blood gas analysis.  Care plan discussed with ICU care team.  Patient remained critical; required more than usual post op care; I have spent 35 minutes providing non-routine post op care, revaluated multiple times during the day.         ====================ASSESSMENT ==============  65M PMH LVAD, recurrent serratia bacteremia, pneumonia, intubated/trach x2 cycles, chronic gall bladder disease s/p percutaneous cholecystectomy, SMA ischemia s/p stent, cachexia who presented from [facility] for septic shock    Plan:  ====================== NEUROLOGY=====================  AMS  - per nursing home staff pt has been altered for one week  - pt AAOx0, not verbally responding to staff   - pt following commands  - continue to monitor neuro status    ==================== RESPIRATORY======================  Hypercarbia  - Pt hypercarbic at OSH, placed on BiPAP   - Weaned BiPAP after admission to [facility] with ABG stable  - continue to monitor respiratory status   - wean NC as tolerated    Hx trach w/ stoma  - Pt trach x2 cycles in past  - most recently in setting of COVID PNA in 02/2022  - Pt decannulated after admission  - purulent drainage in stoma site, cultured pending results  - will consult ENT in AM     ====================CARDIOVASCULAR==================  ACC/AHA stage D systolic heart failure s/p LVAD  - pt appeared hypovolemic on admission  - hypotensive with MAPs in 50-60s  - s/p 1L NS, 250 albumin x1, vas0 0.04  - Hgb 7, gave 1 uPRBC, vaso weaned off   - central sat sent from intro 80%   - LVAD coordinator and HF attending contacted       ===================HEMATOLOGIC/ONC ===================  Anemia  - Hgb on admission 7.4 with subsequent drop to 6.6  - s/p 1u PRBC  - of note, pt has hx of GI bleeds  - will assess BM when able   - transfuse as needed if hgb <7      ===================== RENAL =========================  RASHAWN  - SCr 1.3 (baseline 0.8-0.9)  - urine output improved post fluid resuscitation   - continue to monitor SCr   - continue to monitor urine output       ==================== GASTROINTESTINAL===================\  Hx GI bleed in past req multiple transfusions   - no bleeding noted on this admission  - monitor BM for signs of blood       albumin human  5% IVPB 250 milliLiter(s) IV Intermittent once  potassium chloride  20 mEq/100 mL IVPB 20 milliEquivalent(s) IV Intermittent every 2 hours    =======================    ENDOCRINE  =====================  Hx Hyperthyroidism   - continue home methimazole   - pending TSH     methimazole 10 milliGRAM(s) Oral daily    ========================INFECTIOUS DISEASE================  Septic shock  - pt arrived tachycardic 120s, hypotensive MAPs 50-60, AMS  - WBC 27    piperacillin/tazobactam IVPB.. 3.375 Gram(s) IV Intermittent every 8 hours      ========================PROPHYLACTIC MEASURE================  DVT  GI Protonix  Graft patency   Beta blocker      Patient requires continuous monitoring with bedside rhythm monitoring, arterial line, pulse oximetry, ventilator monitoring and intermittent blood gas analysis.  Care plan discussed with ICU care team.  Patient remained critical; required more than usual post op care; I have spent 35 minutes providing non-routine post op care, revaluated multiple times during the day.         ====================ASSESSMENT ==============  65M PMH LVAD, recurrent serratia bacteremia, pneumonia, intubated/trach x2 cycles, chronic gall bladder disease s/p percutaneous cholecystectomy, SMA ischemia s/p stent, cachexia who presented from [facility] for septic shock    Plan:  ====================== NEUROLOGY=====================  AMS  - per nursing home staff pt has been altered for one week  - pt AAOx0, not verbally responding to staff   - pt following commands  - continue to monitor neuro status    ==================== RESPIRATORY======================  Hypercarbia  - Pt hypercarbic at OSH, placed on BiPAP   - Weaned BiPAP after admission to [facility] with ABG stable  - continue to monitor respiratory status   - wean NC as tolerated    Hx trach w/ stoma  - Pt trach x2 cycles in past  - most recently in setting of COVID PNA in 02/2022  - Pt decannulated after admission  - purulent drainage in stoma site, cultured pending results  - will consult ENT in AM     ====================CARDIOVASCULAR==================  ACC/AHA stage D systolic heart failure s/p LVAD  - pt appeared hypovolemic on admission  - hypotensive with MAPs in 50-60s  - s/p 1L NS, 250 albumin x1, vas0 0.04  - Hgb 7, gave 1 uPRBC, vaso weaned off   - central sat sent from intro 80%   - LVAD coordinator and HF attending contacted     Ventricular Tachycardia  - pt has history of VT on previous admissions, takes home mexiletine  - NSVT seen on tele in CICU  - lido gtt @1   - transition to mexiletine when able to take PO    ===================HEMATOLOGIC/ONC ===================  Anemia  - Hgb on admission 7.4 with subsequent drop to 6.6  - s/p 1u PRBC  - of note, pt has hx of GI bleeds  - will assess BM when able   - transfuse as needed if hgb <7      ===================== RENAL =========================  RASHAWN  - SCr 1.3 (baseline 0.8-0.9)  - urine output improved post fluid resuscitation   - continue to monitor SCr   - continue to monitor urine output       ==================== GASTROINTESTINAL===================\  Hx GI bleed in past req multiple transfusions   - no bleeding noted on this admission  - monitor BM for signs of blood       albumin human  5% IVPB 250 milliLiter(s) IV Intermittent once  potassium chloride  20 mEq/100 mL IVPB 20 milliEquivalent(s) IV Intermittent every 2 hours    =======================    ENDOCRINE  =====================  Hx Hyperthyroidism   - continue home methimazole   - pending TSH     methimazole 10 milliGRAM(s) Oral daily    ========================INFECTIOUS DISEASE================  Septic shock  - pt arrived tachycardic 120s, hypotensive MAPs 50-60, AMS  - WBC 27  - pt got zosyn and vancomycin x1 at OSH  - continuing zosyn for now given history of serratia bacteremia on past admissions    piperacillin/tazobactam IVPB.. 3.375 Gram(s) IV Intermittent every 8 hours      Patient requires continuous monitoring with bedside rhythm monitoring, pulse ox monitoring, and intermittent blood gas analysis. Care plan discussed with ICU care team. Patient remained critical and at risk for life threatening decompensation.  Patient seen, examined and plan discussed with CCU team during rounds.     I have personally provided 35 minutes of critical care time excluding time spent on separate procedures, in addition to initial critical care time provided by the CICU Attending, Dr. Jensen

## 2022-02-28 NOTE — CONSULT NOTE ADULT - ASSESSMENT
67yo male with H/O tracheostomy for respiratory failure S/P decannulation on 12/12 admitted for septic shock. ENT was consulted for possible tracheitis. Bedside tracheoscopy was unremarkable, no purulence noted around the stoma or in the trachea. No evidence of tracheal infection. WBC 16.79

## 2022-02-28 NOTE — CONSULT NOTE ADULT - ASSESSMENT
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 s/p HM2 LVAD in 9/2017 at  (due to severe PAD) with TV ring, multiple GI bleeds, thus maintained off AC, CKD 3prior COVID-19 infection in 4/2020, recurrent syncope post LVAD s/p ILR, s/p prolonged complex hospitalization from 6/14-11/16/2021 initially admitted with abdominal pain complicated by GIB, respiratory failure s/p trach, multiple infections, s/p cholecystotomy tube and SMA stent 10/2021, ultimately discharged to [facility] rehab and then returned to [facility] for trach decannulation 12/2. The patient had a recent admission with COVID 19 reinfection and distributive shock. That admission he had blood culture positive for serratia marcescens (discharged on levaquin).  He was treated with MAB, remdesivir, and steroids. He had recurrent VT and was started mexiletine. Now coming in from [facility] ED with leukocytosis and AMS was treated for UTI. Initially required bipap but was weaned off here. Labs concerning here for WC 26, hemoglobin of 7s then 6.7, BNP 5K, creatinine of 1.3. His maps were initially in the 50s. Physical exam showing pus from stoma, sat of central access of 81.8, fevers, and tachycardia are all concerning for septic shock picture. He is status 1L fluids, 1uPRBCs, and placed on vaso. Patient has known episodes of VT and had recurrent NSVT/VT when he first arrived started on lidocaine 1 with improvement. Of note he was previously on mexiletine 150 TID outpatient, metoprolol ER 25 qAM and 50 qPM. Previous history of thyrotoxicosis on AMIO.     #VT   -C/w Lidocaine drip, transition to mexiletine when taking PO   -Restart BB when not in shock   -Keep K>4 and Mg>2

## 2022-02-28 NOTE — CONSULT NOTE ADULT - ASSESSMENT
Assessment and recommendation :  Acute hypercapnic respiratory Failure on positive pressure non invasive ventilator   Septic shock on vasopressin  Bacteremia with   serratia marcescens  Severe anemia with low H/H   transfuse one unit of Packed RBCs   Severe ischemic cardiomyopathy   ACC/AHA stage D systolic heart failure  Peripheral vascular Disease   PAF on no ACO   H/O of repeated GI bleeding   S/P mesenteric ischemia   GERD on reglan   S/P HM2 LVAD , VT on Mexiletine   hyperthyroidism on  methimazole   severe protein caloric malnutrition   severe neuropathy   Major depression   S/P tracheostomy X2  pan culture blood and urine done   Continue Antibiotic meropenem and vancomycin   GI prophylaxis   case discussed with CICU   estimated  time is 90 minutes

## 2022-02-28 NOTE — CONSULT NOTE ADULT - SUBJECTIVE AND OBJECTIVE BOX
CC: Trach stoma eval     HPI: 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 s/p HM2 LVAD in 9/2017 at  (due to severe PAD) with TV ring, multiple GI bleeds, thus maintained off AC, CKD 3prior COVID-19 infection in 4/2020, recurrent syncope post LVAD s/p ILR, s/p prolonged complex hospitalization from 6/14-11/16/2021 initially admitted with abdominal pain complicated by GIB, respiratory failure s/p trach, multiple infections, s/p cholecystotomy tube and SMA stent 10/2021, ultimately discharged to [facility] and then returned to [facility] for trach decannulation 12/2, readmitted in 2/2022 with recurrent COVID-19 infection, initially in distributive shock. Blood cultures were also positive for serratia marcescens which he has had in the past. ENT was consulted for possible tracheal infections. As per primary team, pt was noted to have secretions from the stoma that appeared purulent, CXs were sent. Pt is currently on C-PAP. No fevers.          PAST MEDICAL & SURGICAL HISTORY:  CHF (congestive heart failure)    CAD (coronary artery disease)    Depression    Pleural effusion    History of 2019 novel coronavirus disease (COVID-19)  april 2020    Hemorrhoids    Bleeding hemorrhoids    Peripheral arterial disease    Claudication    BPH with urinary obstruction    ACC/AHA stage D systolic heart failure    Anticoagulation goal of INR 2.0 to 2.5    Falls    Clavicle fracture    CKD (chronic kidney disease), stage III    Iron deficiency anemia    H/O epistaxis    Vertigo    GI bleed    S/P TVR (tricuspid valve repair)    S/P ventricular assist device    S/P endoscopy    H/O tracheostomy      Allergies    Amiodarone Hydrochloride (Other (Severe))    Intolerances      MEDICATIONS  (STANDING):  cefepime   IVPB 2000 milliGRAM(s) IV Intermittent every 12 hours  chlorhexidine 2% Cloths 1 Application(s) Topical <User Schedule>  cholecalciferol 1000 Unit(s) Oral daily  dronabinol 5 milliGRAM(s) Oral two times a day before meals  gabapentin Solution 100 milliGRAM(s) Oral three times a day  methimazole 10 milliGRAM(s) Oral daily  metoclopramide 10 milliGRAM(s) Oral four times a day  mirtazapine 7.5 milliGRAM(s) Oral at bedtime  multivitamin 1 Tablet(s) Oral daily  pantoprazole    Tablet 40 milliGRAM(s) Oral before breakfast  senna 2 Tablet(s) Oral at bedtime  sertraline 100 milliGRAM(s) Oral daily  vasopressin Infusion 0.08 Unit(s)/Min (4.8 mL/Hr) IV Continuous <Continuous>    MEDICATIONS  (PRN):      Social History: no tobacco use    Family history: no pertinent FHx    ROS:   ENT: all negative except as noted in HPI   CV: denies palpitations  Pulm: denies SOB, cough, hemoptysis  GI: denies change in apetite, indigestion, n/v  : denies pertinent urinary symptoms, urgency  Neuro: denies numbness/tingling, loss of sensation  Psych: denies anxiety  MS: denies muscle weakness, instability  Heme: denies easy bruising or bleeding  Endo: denies heat/cold intolerance, excessive sweating  Vascular: denies LE edema    Vital Signs Last 24 Hrs  T(C): 35.7 (28 Feb 2022 09:30), Max: 38.3 (28 Feb 2022 05:00)  T(F): 96.3 (28 Feb 2022 09:30), Max: 100.9 (28 Feb 2022 05:00)  HR: 78 (28 Feb 2022 11:45) (69 - 138)  BP: --  BP(mean): 72 (27 Feb 2022 21:20) (72 - 72)  RR: 16 (28 Feb 2022 11:45) (9 - 36)  SpO2: 100% (28 Feb 2022 11:45) (86% - 100%)                          6.6    16.79 )-----------( 87       ( 28 Feb 2022 10:29 )             21.2    02-28    143  |  112<H>  |  23  ----------------------------<  151<H>  4.8   |  19<L>  |  1.14    Ca    8.9      28 Feb 2022 04:53  Phos  2.9     02-28  Mg     2.1     02-28    TPro  7.6  /  Alb  2.6<L>  /  TBili  0.5  /  DBili  x   /  AST  139<H>  /  ALT  60<H>  /  AlkPhos  143<H>  02-28   PT/INR - ( 27 Feb 2022 23:13 )   PT: 16.1 sec;   INR: 1.34 ratio             PHYSICAL EXAM:  Gen: NAD  Skin: No rashes, bruises, or lesions  Head: Normocephalic, Atraumatic  Face: no edema, erythema, or fluctuance. Parotid glands soft without mass  Eyes: no scleral injection  Nose: Nares bilaterally patent, no discharge  Mouth: No Stridor / Drooling / Trismus.  Mucosa moist, tongue/uvula midline, oropharynx clear  Neck: Pinpoint stoma, dry, no purulence, no erythema, Flat, supple, no lymphadenopathy, trachea midline, no masses  Lymphatic: No lymphadenopathy  Resp: breathing easily, no stridor  CV: no peripheral edema/cyanosis  GI: nondistended   Peripheral vascular: no JVD or edema  Neuro: facial nerve intact, no facial droop      After putting tube feeds on hold, scope was slowly advanced into the stoma and the trachea was found to be patent down to the bud, no purulence, no erythema, no bleeding. Pt is currently on CPAP. CC: Trach stoma eval     HPI: 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 s/p HM2 LVAD in 9/2017 at  (due to severe PAD) with TV ring, multiple GI bleeds, thus maintained off AC, CKD 3prior COVID-19 infection in 4/2020, recurrent syncope post LVAD s/p ILR, s/p prolonged complex hospitalization from 6/14-11/16/2021 initially admitted with abdominal pain complicated by GIB, respiratory failure s/p trach, multiple infections, s/p cholecystotomy tube and SMA stent 10/2021, ultimately discharged to [facility] and then returned to [facility] for trach decannulation 12/2, readmitted in 2/2022 with recurrent COVID-19 infection, initially in distributive shock. Blood cultures were also positive for serratia marcescens which he has had in the past. ENT was consulted for possible tracheal infections. As per primary team, pt was noted to have secretions from the stoma that appeared purulent, CXs were sent. Pt is currently on C-PAP. No fevers.          PAST MEDICAL & SURGICAL HISTORY:  CHF (congestive heart failure)    CAD (coronary artery disease)    Depression    Pleural effusion    History of 2019 novel coronavirus disease (COVID-19)  april 2020    Hemorrhoids    Bleeding hemorrhoids    Peripheral arterial disease    Claudication    BPH with urinary obstruction    ACC/AHA stage D systolic heart failure    Anticoagulation goal of INR 2.0 to 2.5    Falls    Clavicle fracture    CKD (chronic kidney disease), stage III    Iron deficiency anemia    H/O epistaxis    Vertigo    GI bleed    S/P TVR (tricuspid valve repair)    S/P ventricular assist device    S/P endoscopy    H/O tracheostomy      Allergies    Amiodarone Hydrochloride (Other (Severe))    Intolerances      MEDICATIONS  (STANDING):  cefepime   IVPB 2000 milliGRAM(s) IV Intermittent every 12 hours  chlorhexidine 2% Cloths 1 Application(s) Topical <User Schedule>  cholecalciferol 1000 Unit(s) Oral daily  dronabinol 5 milliGRAM(s) Oral two times a day before meals  gabapentin Solution 100 milliGRAM(s) Oral three times a day  methimazole 10 milliGRAM(s) Oral daily  metoclopramide 10 milliGRAM(s) Oral four times a day  mirtazapine 7.5 milliGRAM(s) Oral at bedtime  multivitamin 1 Tablet(s) Oral daily  pantoprazole    Tablet 40 milliGRAM(s) Oral before breakfast  senna 2 Tablet(s) Oral at bedtime  sertraline 100 milliGRAM(s) Oral daily  vasopressin Infusion 0.08 Unit(s)/Min (4.8 mL/Hr) IV Continuous <Continuous>    MEDICATIONS  (PRN):      Social History: no tobacco use    Family history: no pertinent FHx    ROS:   ENT: all negative except as noted in HPI   CV: denies palpitations  Pulm: denies SOB, cough, hemoptysis  GI: denies change in apetite, indigestion, n/v  : denies pertinent urinary symptoms, urgency  Neuro: denies numbness/tingling, loss of sensation  Psych: denies anxiety  MS: denies muscle weakness, instability  Heme: denies easy bruising or bleeding  Endo: denies heat/cold intolerance, excessive sweating  Vascular: denies LE edema    Vital Signs Last 24 Hrs  T(C): 35.7 (28 Feb 2022 09:30), Max: 38.3 (28 Feb 2022 05:00)  T(F): 96.3 (28 Feb 2022 09:30), Max: 100.9 (28 Feb 2022 05:00)  HR: 78 (28 Feb 2022 11:45) (69 - 138)  BP: --  BP(mean): 72 (27 Feb 2022 21:20) (72 - 72)  RR: 16 (28 Feb 2022 11:45) (9 - 36)  SpO2: 100% (28 Feb 2022 11:45) (86% - 100%)                          6.6    16.79 )-----------( 87       ( 28 Feb 2022 10:29 )             21.2    02-28    143  |  112<H>  |  23  ----------------------------<  151<H>  4.8   |  19<L>  |  1.14    Ca    8.9      28 Feb 2022 04:53  Phos  2.9     02-28  Mg     2.1     02-28    TPro  7.6  /  Alb  2.6<L>  /  TBili  0.5  /  DBili  x   /  AST  139<H>  /  ALT  60<H>  /  AlkPhos  143<H>  02-28   PT/INR - ( 27 Feb 2022 23:13 )   PT: 16.1 sec;   INR: 1.34 ratio             PHYSICAL EXAM:  Gen: NAD  Skin: No rashes, bruises, or lesions  Head: Normocephalic, Atraumatic  Face: no edema, erythema, or fluctuance. Parotid glands soft without mass  Eyes: no scleral injection  Nose: Nares bilaterally patent, no discharge  Mouth: No Stridor / Drooling / Trismus.  Mucosa moist, tongue/uvula midline, oropharynx clear  Neck: 3-4mm stoma, dry, no purulence, no erythema, Flat, supple, no lymphadenopathy, trachea midline, no masses  Lymphatic: No lymphadenopathy  Resp: breathing easily, no stridor  CV: no peripheral edema/cyanosis  GI: nondistended   Peripheral vascular: no JVD or edema  Neuro: facial nerve intact, no facial droop      After putting tube feeds on hold, scope was slowly advanced into the stoma and the trachea was found to be patent down to the bud, no purulence, no erythema, no bleeding. Pt is currently on CPAP.

## 2022-02-28 NOTE — CONSULT NOTE ADULT - SUBJECTIVE AND OBJECTIVE BOX
INFECTIOUS DISEASE CONSULT NOTE    Patient is a 66y old  Male who presents with a chief complaint of Septic shock (2022 12:06)    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 s/p HM2 LVAD in 2017 at  (due to severe PAD) with TV ring, multiple GI bleeds, thus maintained off AC, CKD 3prior COVID-19 infection in 2020, recurrent syncope post LVAD s/p ILR, s/p prolonged complex hospitalization from -2021 initially admitted with abdominal pain complicated by GIB, respiratory failure s/p trach, multiple infections, s/p cholecystotomy tube and SMA stent 10/2021, ultimately discharged to [facility] rehab and then returned to [facility] for trach decannulation , readmitted in 2022 with recurrent COVID-19 infection, initially in distributive shock. Blood cultures were also positive for serratia marcescens which he has had in the past.     Pt was transferred to [facility] CICU from [facility]. He was sent to their ED from a rehab center due to AMS and tachycardia. At [facility], they noted a WBC greater than 20, tachycardia and hypercarbia on ABG. Pt was placed on BiPAP and transferred to [facility] CICU for concern for sepsis vs septic shock.      (2022 22:20)       REVIEW OF SYSTEMS:  CONSTITUTIONAL: No unintentional weight loss; no weakness; no fevers or chills  HEENT: No decrease hearing, tinnitus, ear pain, rhinorrhea, congestion, or sore throat  RESPIRATORY: No cough, wheezing, hemoptysis; no shortness of breath/shortness of breath on exertion; no orthopnea  CARDIOVASCULAR: No chest pain or palpitations  GASTROINTESTINAL: No abdominal or epigastric pain; no change in appetite; no early satiety; no nausea, vomiting, or hematemesis; no diarrhea or constipation; no melena or hematochezia; no change of stool caliber  GENITOURINARY: No dysuria, increased in urinary frequency or hematuria; no urethral discharge  NEUROLOGICAL: No headache/dizziness; no focal numbness or motor weakness  MSK: No joint pain, erythema, or swelling; no back pain  SKIN: No itching, rashes; no recent insect bites  All other ROS negative except noted above    Prior hospital charts reviewed [Yes]  Primary team notes reviewed [Yes]  Other consultant notes reviewed [Yes]    PAST MEDICAL & SURGICAL HISTORY:  CHF (congestive heart failure)    CAD (coronary artery disease)    Depression    Pleural effusion    History of 2019 novel coronavirus disease (COVID-19)  2020    Hemorrhoids    Bleeding hemorrhoids    Peripheral arterial disease    Claudication    BPH with urinary obstruction    ACC/AHA stage D systolic heart failure    Anticoagulation goal of INR 2.0 to 2.5    Falls    Clavicle fracture    CKD (chronic kidney disease), stage III    Iron deficiency anemia    H/O epistaxis    Vertigo    GI bleed    S/P TVR (tricuspid valve repair)    S/P ventricular assist device    S/P endoscopy    H/O tracheostomy        SOCIAL HISTORY:  - Born in [place], [place] in 19XX  - Currently working as / Retired  - Lives with _____; no pets  - No recent travel  - Denies tobacco use  - Denies alcohol use  - Denies illicit drug use  - Currently sexually active, has one male/female sexual partner    FAMILY HISTORY:      Allergies:  Amiodarone Hydrochloride (Other (Severe))      ANTIMICROBIALS:  cefepime   IVPB 2000 every 12 hours      ANTIMICROBIALS (past 90 days):  MEDICATIONS  (STANDING):    piperacillin/tazobactam IVPB..   25 mL/Hr IV Intermittent (22 @ 06:15)    vancomycin  IVPB   250 mL/Hr IV Intermittent (22 @ 06:42)        OTHER MEDS:   MEDICATIONS  (STANDING):  dronabinol 5 two times a day before meals  gabapentin Solution 100 three times a day  methimazole 10 daily  metoclopramide 10 four times a day  mirtazapine 7.5 at bedtime  pantoprazole    Tablet 40 before breakfast  senna 2 at bedtime  sertraline 100 daily  vasopressin Infusion 0.08 <Continuous>      VITALS:  Vital Signs Last 24 Hrs  T(F): 99 (22 @ 15:00), Max: 100.9 (22 @ 05:00)    Vital Signs Last 24 Hrs  HR: 94 (22 @ 16:15) (69 - 138)  BP: --  RR: 20 (22 @ 16:15)  SpO2: 100% (22 @ 16:15) (82% - 100%)  Wt(kg): --    EXAM:  GENERAL: NAD, lying in bed comfortably  HEAD: Atraumatic, Normocephalic  EYES: EOMI, PERRLA, conjunctiva pink and cornea white  ENT: Normal external ears and nose, no discharges; moist mucous membranes, no erythema on posterior oropharynx  NECK: Supple, nontender to palpation; no JVD  CHEST/LUNG: Clear to auscultation bilaterally; No rales, rhonchi, wheezing, or rubs. Unlabored respirations  HEART: Regular rate and rhythm; No murmurs, rubs, or gallops  ABDOMEN: Soft, nontender, nondistended; no rebound tenderness, no guarding; no hepatomegaly; normoactive/hyperactive/hypoactive bowel sounds  EXTREMITIES:  2+ Peripheral Pulses, brisk capillary refill. No clubbing, cyanosis, or petal edema  NERVOUS SYSTEM: Alert and oriented to person, time, place and situation, speech clear. No focal deficits   MSK: FROM all 4 extremities, full and equal strength; no joint erythema, swelling or pain  SKIN: No rashes or lesions, no superficial thrombophlebitis    Labs:                        8.8    .53 )-----------( 96       ( 2022 15:47 )             26.9         139  |  110<H>  |  19  ----------------------------<  164<H>  4.3   |  18<L>  |  0.98    Ca    8.7      2022 15:47  Phos  2.9       Mg     2.1         TPro  7.2  /  Alb  2.3<L>  /  TBili  0.5  /  DBili  x   /  AST  82<H>  /  ALT  47<H>  /  AlkPhos  131<H>        WBC Trend:  WBC Count: 22.53 (22 @ 15:47)  WBC Count: 16.79 (22 @ 10:29)  WBC Count: 26.66 (22 @ 04:53)  WBC Count: 26.98 (22 @ 23:12)      Auto Neutrophil #: 20.68 K/uL (22 @ 15:47)  Auto Neutrophil #: 16.08 K/uL (22 @ 10:29)  Auto Neutrophil #: 24.76 K/uL (22 @ 04:53)  Auto Neutrophil #: 9.43 K/uL (21 @ 05:40)  Auto Neutrophil #: 10.56 K/uL (21 @ 06:16)      Creatine Trend:  Creatinine, Serum: 0.98 ()  Creatinine, Serum: 1.14 ()  Creatinine, Serum: 1.31 ()      Liver Biochemical Testing Trend:  Alanine Aminotransferase (ALT/SGPT): 47 *H* ()  Alanine Aminotransferase (ALT/SGPT): 60 *H* ()  Alanine Aminotransferase (ALT/SGPT): 71 *H* ()  Alanine Aminotransferase (ALT/SGPT): SEE NOTE ()  Alanine Aminotransferase (ALT/SGPT): 11 ()  Aspartate Aminotransferase (AST/SGOT): 82 (22 @ 15:47)  Aspartate Aminotransferase (AST/SGOT): 139 (22 @ 04:53)  Aspartate Aminotransferase (AST/SGOT): 205 (22 @ 23:13)  Aspartate Aminotransferase (AST/SGOT): SEE NOTE (22 @ 23:13)  Aspartate Aminotransferase (AST/SGOT): 20 (22 @ 05:39)  Bilirubin Total, Serum: 0.5 ()  Bilirubin Total, Serum: 0.5 ()  Bilirubin Total, Serum: 0.6 ()  Bilirubin Direct, Serum: 0.3 ()  Bilirubin Total, Serum: SEE NOTE ()      Trend LDH  22 @ 23:13  381<H>  22 @ 06:16  227  22 @ 06:14  254<H>  22 @ 06:36  249<H>  22 @ 06:49  270<H>      Auto Eosinophil %: 0.6 % (22 @ 15:47)  Auto Eosinophil %: 0.0 % (22 @ 10:29)  Auto Eosinophil %: 0.0 % (22 @ 04:53)      Urinalysis Basic - ( 2022 23:14 )    Color: Yellow / Appearance: Clear / S.040 / pH: x  Gluc: x / Ketone: see note  / Bili: see note / Urobili: see note   Blood: x / Protein: see note / Nitrite: see note   Leuk Esterase: see note / RBC: x / WBC x   Sq Epi: x / Non Sq Epi: x / Bacteria: x        MICROBIOLOGY:  Vancomycin Level, Random: 8.5 ( @ 04:53)    MRSA PCR Result.: NotDetec (22 @ 04:25)  MRSA PCR Result.: NotDetec (21 @ 07:59)  MRSA PCR Result.: NotDetec (10-17-21 @ 21:55)  MRSA PCR Result.: NotDetec (21 @ 23:02)      Culture - Blood (collected 2022 10:22)  Source: .Blood Blood  Final Report:    No Growth Final    Culture - Blood (collected 2022 10:22)  Source: .Blood Blood  Final Report:    No Growth Final    Culture - Blood (collected 2022 22:53)  Source: .Blood Blood  Final Report:    No Growth Final    Culture - Blood (collected 2022 23:02)  Source: .Blood Blood-Venous  Final Report:    Growth in aerobic and anaerobic bottles: Serratia marcescens    ***Blood Panel PCR results on this specimen are available    approximately 3 hours after the Gram stain result.***    Gram stain, PCR, and/or culture results may not always    correspond due to difference in methodologies.    ************************************************************    This PCR assay was performed by multiplex PCR. This    Assay tests for 66 bacterial and resistance gene targets.    Please refer to the [facility] test directory    at [email or web address] for details.  Organism: Blood Culture PCR  Serratia marcescens  Organism: Serratia marcescens    Sensitivities:      -  Amikacin: S <=16      -  Ampicillin: R >16 These ampicillin results predict results for amoxicillin      -  Ampicillin/Sulbactam: R >16/8 Enterobacter, Klebsiella aerogenes, Citrobacter, and Serratia may develop resistance during prolonged therapy (3-4 days)      -  Aztreonam: S <=4      -  Cefazolin: R >16 Enterobacter, Klebsiella aerogenes, Citrobacter, and Serratia may develop resistance during prolonged therapy (3-4 days)      -  Cefepime: S <=2      -  Cefoxitin: R >16      -  Ceftriaxone: S <=1 Enterobacter, Klebsiella aerogenes, Citrobacter, and Serratia may develop resistance during prolonged therapy      -  Ciprofloxacin: R >2      -  Ertapenem: S <=0.5      -  Gentamicin: S <=2      -  Levofloxacin: R 2      -  Meropenem: S <=1      -  Piperacillin/Tazobactam: S <=8      -  Tobramycin: S <=2      -  Trimethoprim/Sulfamethoxazole: S       Method Type: CLAU  Organism: Blood Culture PCR    Sensitivities:      -  Serratia marcescens: Detec      Method Type: PCR    Culture - Blood (collected 30 Dec 2021 22:07)  Source: .Blood Blood  Final Report:    No Growth Final    Culture - Blood (collected 18 Dec 2021 08:15)  Source: .Blood Blood  Final Report:    No Growth Final    Culture - Blood (collected 13 Dec 2021 05:50)  Source: .Blood Arterial Catheter  Final Report:    Growth in aerobic and anaerobic bottles: Serratia marcescens  Organism: Serratia marcescens  Organism: Serratia marcescens    Sensitivities:      -  Amikacin: S <=16      -  Ampicillin: R >16 These ampicillin results predict results for amoxicillin      -  Ampicillin/Sulbactam: R >16/8 Enterobacter, Klebsiella aerogenes, Citrobacter, and Serratia may develop resistance during prolonged therapy (3-4 days)      -  Aztreonam: S <=4      -  Cefazolin: R >16 Enterobacter, Klebsiella aerogenes, Citrobacter, and Serratia may develop resistance during prolonged therapy (3-4 days)      -  Cefepime: S <=2      -  Cefoxitin: R >16      -  Ceftriaxone: S <=1 Enterobacter, Klebsiella aerogenes, Citrobacter, and Serratia may develop resistance during prolonged therapy      -  Ciprofloxacin: R >2      -  Ertapenem: S <=0.5      -  Gentamicin: S <=2      -  Levofloxacin: R 2      -  Meropenem: S <=1      -  Piperacillin/Tazobactam: S <=8      -  Tobramycin: S 4      -  Trimethoprim/Sulfamethoxazole: S 2/38      Method Type: CLAU    Culture - Blood (collected 13 Dec 2021 05:50)  Source: .Blood Blood-Peripheral  Final Report:    Growth in aerobic bottle: Serratia marcescens See previous culture    [id number]    ***Blood Panel PCR results on this specimen are available    approximately 3 hours after the Gram stain result.***    Gram stain, PCR, and/or culture results may not always    correspond due to difference in methodologies.    ************************************************************    This PCR assay was performed by multiplex PCR. This    Assay tests for 66 bacterial and resistance gene targets.    Please refer to the [facility] test directory    at [email or web address] for details.  Organism: Blood Culture PCR  Organism: Blood Culture PCR    Sensitivities:      -  Serratia marcescens: Detec      Method Type: PCR    Culture - Blood (collected 13 Dec 2021 05:50)  Source: .Blood Blood-Peripheral  Final Report:    Growth in aerobic bottle: Serratia marcescens    See previous culture [id number]    Culture - Sputum (collected 30 Oct 2021 16:29)  Source: .Sputum Sputum  Final Report:    Few Stenotrophomonas maltophilia    Normal Respiratory Bria present  Organism: Stenotrophomonas maltophilia  Organism: Stenotrophomonas maltophilia    Sensitivities:      -  Ceftazidime: S 8      -  Levofloxacin: I 4      -  Trimethoprim/Sulfamethoxazole: S <=0.5/9.5      Method Type: CLAU      HIV-1/2 Combo Result: Nonreact (21 @ 08:18)    ABS CD4: 129 /uL (20 @ 08:38)                    COVID-19 PCR: NotDetec (22 @ 13:13)  COVID-19 PCR: NotDetec (22 @ 06:10)  COVID-19 PCR: NotDetec (22 @ 05:55)    COVID-19 [place] Domain AB Interp: Positive (21 @ 04:49)  COVID-19 Nucleocapsid PAT AB Interp: Positive (21 @ 04:49)  COVID-19 [place] Domain AB Interp: Positive (06-15-21 @ 10:15)      Procalcitonin, Serum: 10.93 ()      Ferritin, Serum: 1828 ()      Lactate Dehydrogenase, Serum: 381 ()    Serum Pro-Brain Natriuretic Peptide: 5486 ()    Troponin T, High Sensitivity Result: 24 ()    Blood Gas Arterial, Lactate: 0.9 ( @ 15:44)  Blood Gas Arterial, Lactate: 0.9 ( @ 10:22)  Blood Gas Venous - Lactate: 1.2 ( @ 10:22)  Blood Gas Arterial, Lactate: 1.2 ( @ 04:51)        RADIOLOGY:  imaging below personally reviewed    < from: CT Head No Cont (22 @ 13:05) >  No hydrocephalus, acute intracranial hemorrhage, mass effect, or brain   edema.    < end of copied text >    < from: Xray Chest 1 View- PORTABLE-Urgent (Xray Chest 1 View- PORTABLE-Urgent .) (22 @ 11:10) >  IMPRESSION:  Clear lungs.    < end of copied text >

## 2022-02-28 NOTE — CONSULT NOTE ADULT - SUBJECTIVE AND OBJECTIVE BOX
CHIEF COMPLAINT:    pulmonary consultation : acute hypercapnic respiratory failure .  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 s/p HM2 LVAD in 2017 at  (due to severe PAD) with TV ring, multiple GI bleeds, thus maintained off AC, CKD 3prior COVID-19 infection in 2020, recurrent syncope post LVAD s/p ILR, s/p prolonged complex hospitalization from -2021 initially admitted with abdominal pain complicated by GIB, respiratory failure s/p trach, multiple infections, s/p cholecystotomy tube and SMA stent 10/2021, ultimately discharged to [facility] rehab and then returned to [facility] for trach decannulation , readmitted in 2022 with recurrent COVID-19 infection, initially in distributive shock. Blood cultures were also positive for serratia marcescens which he has had in the past.     Pt was transferred to [facility] CICU from [facility]. He was sent to their ED from a rehab center due to AMS and tachycardia. At [facility], they noted a WBC greater than 20, tachycardia and hypercarbia on ABG. Pt was placed on BiPAP and transferred to [facility] CICU for concern for sepsis vs septic shock.      (2022 22:20)    PAST MEDICAL & SURGICAL HISTORY:  CHF (congestive heart failure)    CAD (coronary artery disease)    Depression    Pleural effusion    History of 2019 novel coronavirus disease (COVID-19)  2020    Hemorrhoids    Bleeding hemorrhoids    Peripheral arterial disease    Claudication    BPH with urinary obstruction    ACC/AHA stage D systolic heart failure    Anticoagulation goal of INR 2.0 to 2.5    Falls    Clavicle fracture    CKD (chronic kidney disease), stage III    Iron deficiency anemia    H/O epistaxis    Vertigo    GI bleed    S/P TVR (tricuspid valve repair)    S/P ventricular assist device    S/P endoscopy    H/O tracheostomy        FAMILY HISTORY:    Social History:    Marital Status:  X    (   ) Single    (   )    (  )   Occupation: Retired   Lives with: (  ) alone  (  ) children  X spouse   (  ) parents  (  ) other    Substance Use (street drugs): X never used  (  ) other:  Tobacco Usage: X never smoked   (   ) former smoker   (   ) current smoker  (     ) pack year  (    ) last cigarette date  Alcohol Usage: Social      OBJECTIVE:  Vital Signs Last 24 Hrs  T(C): 35.7 (2022 09:30), Max: 38.3 (2022 05:00)  T(F): 96.3 (2022 09:30), Max: 100.9 (2022 05:00)  HR: 138 (2022 10:30) (69 - 138)  BP: --  BP(mean): 72 (2022 21:20) (72 - 72)  RR: 38 (2022 10:30) (9 - 38)  SpO2: 100% (2022 10:30) (86% - 100%)       @ 07: @ 07:00  --------------------------------------------------------  IN: 4363.2 mL / OUT: 675 mL / NET: 3688.2 mL     @ 07: @ 10:58  --------------------------------------------------------  IN: 0 mL / OUT: 45 mL / NET: -45 mL      HOSPITAL MEDICATIONS:   cefepime   IVPB 2000 milliGRAM(s) IV Intermittent every 12 hours  chlorhexidine 2% Cloths 1 Application(s) Topical <User Schedule>  cholecalciferol 1000 Unit(s) Oral daily  dronabinol 5 milliGRAM(s) Oral two times a day before meals  gabapentin Solution 100 milliGRAM(s) Oral three times a day  lactated ringers Bolus 1000 milliLiter(s) IV Bolus once  methimazole 10 milliGRAM(s) Oral daily  metoclopramide 10 milliGRAM(s) Oral four times a day  mirtazapine 7.5 milliGRAM(s) Oral at bedtime  multivitamin 1 Tablet(s) Oral daily  pantoprazole    Tablet 40 milliGRAM(s) Oral before breakfast  senna 2 Tablet(s) Oral at bedtime  sertraline 100 milliGRAM(s) Oral daily  vasopressin Infusion 0.08 Unit(s)/Min IV Continuous <Continuous>    Amiodarone Hydrochloride (Other (Severe))    CONSTITUTIONAL: No fever, weight loss, or fatigue  EYES: No eye pain, visual disturbances, or discharge  ENMT:  No difficulty hearing, tinnitus, vertigo; No sinus or throat pain  NECK: No pain or stiffness  BREASTS: No pain, masses, or nipple discharge  RESPIRATORY: No cough, wheezing, chills or hemoptysis; No shortness of breath  CARDIOVASCULAR: No chest pain, palpitations, dizziness, or leg swelling  GASTROINTESTINAL: No abdominal or epigastric pain. No nausea, vomiting, or hematemesis; No diarrhea or constipation. No melena or hematochezia.  GENITOURINARY: No dysuria, frequency, hematuria, or incontinence  NEUROLOGICAL: No headaches, memory loss, loss of strength, numbness, or tremors  SKIN: No itching, burning, rashes, or lesions   LYMPH NODES: No enlarged glands  ENDOCRINE: No heat or cold intolerance; No hair loss  MUSCULOSKELETAL: No joint pain or swelling; No muscle, back, or extremity pain  PSYCHIATRIC: No depression, anxiety, mood swings, or difficulty sleeping  HEME/LYMPH: No easy bruising, or bleeding gums  ALLERY AND IMMUNOLOGIC: No hives or eczema      PHYSICAL EXAM:Daily Height in cm: 177.8 (2022 21:20)    Daily Weight in k.7 (2022 21:20)  HEENT:     + NCAT  + EOMI  - Conjuctival edema   - Icterus   - Thrush   - ETT  - NGT/OGT  Neck:         + FROM    + JVD     - Nodes     - Masses    + Mid-line trachea   - Tracheostomy  Chest: normal findings  Lungs:          + CTA   - Rhonchi    - Rales    - Wheezing     - Decreased BS   - Dullness R L  Cardiac:       + S1 + S2    + RRR   - Irregular   - S3  - S4    - Murmurs   - Rub   - Hamman’s sign   Abdomen:    + BS     + Soft    + Non-tender     - Distended    - Organomegaly  - PEG  Extremities:   - Cyanosis U/L   - Clubbing  U/L  - LE/UE Edema   + Capillary refill    + Pulses   Neuro:        + Awake   +  Alert   - Confused   - Lethargic   - Sedated   - Generalized Weakness  Skin:        - Rashes    - Erythema   + Normal incisions   + IV sites intact  - Sacral decubitus    LABS:                        7.8    26.66 )-----------( 117      ( 2022 04:53 )             24.6         143  |  112<H>  |  23  ----------------------------<  151<H>  4.8   |  19<L>  |  1.14    Ca    8.9      2022 04:53  Phos  2.9       Mg     2.1         TPro  7.6  /  Alb  2.6<L>  /  TBili  0.5  /  DBili  x   /  AST  139<H>  /  ALT  60<H>  /  AlkPhos  143<H>      PT/INR - ( 2022 23:13 )   PT: 16.1 sec;   INR: 1.34 ratio           LIVER FUNCTIONS - ( 2022 04:53 )  Alb: 2.6 g/dL / Pro: 7.6 g/dL / ALK PHOS: 143 U/L / ALT: 60 U/L / AST: 139 U/L / GGT: x           Arterial Blood Gas:   @ 10:22  7.35/38/194/21/99.5/-4.2  ABG lactate: --  Arterial Blood Gas:   @ 04:51  7.39/35/135/21/99.2/-3.4  ABG lactate: --  Arterial Blood Gas:   @ 02:15  7.38/35/140/21/98.3/-4.0  ABG lactate: --  Arterial Blood Gas:   @ 00:49  7.39/36/186/22/100.0/-2.9  ABG lactate: --  Arterial Blood Gas:   @ 22:18  7.42/35/181/23/100.0/-1.6  ABG lactate: --   Venous Blood Gas:   @ 10:22  7.30/44/56/21/76.6  VBG Lactate: 1.2  Venous Blood Gas:   @ 01:58  7.33/43/51/23/81.8  VBG Lactate: 1.1        LIVER FUNCTIONS - ( 2022 04:53 )  Alb: 2.6 g/dL / Pro: 7.6 g/dL / ALK PHOS: 143 U/L / ALT: 60 U/L / AST: 139 U/L / GGT: x             RADIOLOGY:  X Reviewed and interpreted by me   CHIEF COMPLAINT: SOB ,     pulmonary consultation : acute hypercapnic respiratory failure . Septic shock , service rendered on 2002   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 s/p HM2 LVAD in 2017 at  (due to severe PAD) with TV ring, multiple GI bleeds, thus maintained off AC, CKD 3prior COVID-19 infection in 2020, recurrent syncope post LVAD s/p ILR, s/p prolonged complex hospitalization from -2021 initially admitted with abdominal pain complicated by GIB, respiratory failure s/p trach, multiple infections, s/p cholecystotomy tube and SMA stent 10/2021, ultimately discharged to [facility] rehab and then returned to [facility] for trach decannulation , readmitted in 2022 with recurrent COVID-19 infection, initially in distributive shock. Blood cultures were also positive for serratia marcescens which he has had in the past.     Pt was transferred to [facility] CICU from [facility]. He was sent to their ED from a rehab center due to AMS and tachycardia. At [facility], they noted a WBC greater than 20, tachycardia and hypercarbia on ABG. Pt was placed on BiPAP and transferred to [facility] CICU for concern for sepsis vs septic shock. patient was seen and examined on 2002     (2022 22:20)    PAST MEDICAL & SURGICAL HISTORY:  CHF (congestive heart failure)    CAD (coronary artery disease)    Depression    Pleural effusion    History of 2019 novel coronavirus disease (COVID-19)  2020    Hemorrhoids    Bleeding hemorrhoids    Peripheral arterial disease    Claudication    BPH with urinary obstruction    ACC/AHA stage D systolic heart failure    Anticoagulation goal of INR 2.0 to 2.5    Falls    Clavicle fracture    CKD (chronic kidney disease), stage III    Iron deficiency anemia    H/O epistaxis    Vertigo    GI bleed    S/P TVR (tricuspid valve repair)    S/P ventricular assist device    S/P endoscopy    H/O tracheostomy        FAMILY HISTORY:    Social History:    Marital Status:  X    (   ) Single    (   )    (  )   Occupation: Retired   Lives with: (  ) alone  (  ) children  X spouse   (  ) parents  (  ) other    Substance Use (street drugs): X never used  (  ) other:  Tobacco Usage: X never smoked   (   ) former smoker   (   ) current smoker  (     ) pack year  (    ) last cigarette date  Alcohol Usage: Social      OBJECTIVE:  Vital Signs Last 24 Hrs  T(C): 35.7 (2022 09:30), Max: 38.3 (2022 05:00)  T(F): 96.3 (2022 09:30), Max: 100.9 (2022 05:00)  HR: 138 (2022 10:30) (69 - 138)  BP: --  BP(mean): 72 (2022 21:20) (72 - 72)  RR: 38 (2022 10:30) (9 - 38)  SpO2: 100% (2022 10:30) (86% - 100%)       @ 07: @ 07:00  --------------------------------------------------------  IN: 4363.2 mL / OUT: 675 mL / NET: 3688.2 mL     @ 07: @ 10:58  --------------------------------------------------------  IN: 0 mL / OUT: 45 mL / NET: -45 mL      HOSPITAL MEDICATIONS:   cefepime   IVPB 2000 milliGRAM(s) IV Intermittent every 12 hours  chlorhexidine 2% Cloths 1 Application(s) Topical <User Schedule>  cholecalciferol 1000 Unit(s) Oral daily  dronabinol 5 milliGRAM(s) Oral two times a day before meals  gabapentin Solution 100 milliGRAM(s) Oral three times a day  lactated ringers Bolus 1000 milliLiter(s) IV Bolus once  methimazole 10 milliGRAM(s) Oral daily  metoclopramide 10 milliGRAM(s) Oral four times a day  mirtazapine 7.5 milliGRAM(s) Oral at bedtime  multivitamin 1 Tablet(s) Oral daily  pantoprazole    Tablet 40 milliGRAM(s) Oral before breakfast  senna 2 Tablet(s) Oral at bedtime  sertraline 100 milliGRAM(s) Oral daily  vasopressin Infusion 0.08 Unit(s)/Min IV Continuous <Continuous>    Amiodarone Hydrochloride (Other (Severe))    CONSTITUTIONAL: Can't be done Patient is on ventilator in distributive Shock Shock     PHYSICAL EXAM:Daily Height in cm: 177.8 (2022 21:20)  Elderly frail mail on positive pressure non invasive ventilator I/E ratio is14/5 Fi02  is40%  Daily Weight in k.7 (2022 21:20)  HEENT:     + NCAT  + EOMI  - Conjuctival edema   - Icterus   - Thrush   - ETT  - NGT/OGT  Neck:         + FROM RT IJ  JVD     - Nodes     - Masses    + Mid-line trachea   - Tracheostomy  Chest:           mild kyphosis   Lungs:          + CTA  + Rhonchi  + Rales    - Wheezing  + Decreased BS   - Dullness R L  Cardiac:       + S1 + S2    + RRR   - Irregular   - S3  - S4    - Murmurs   - Rub   - Hamman’s sign   Abdomen:    + BS     + Soft    + Non-tender  - Distended  - Organomegaly  - PEG Cholecystostomy tube in place   Extremities:   - Cyanosis U/L   - Clubbing  U/L  - LE/UE Edema   + Capillary refill    + Pulses   Neuro:        + Awake   +  Alert   - Confused   - Lethargic   - Sedated   - Generalized Weakness  Skin:        - Rashes    - Erythema   + Normal incisions   + IV sites intact  - Sacral decubitus    LABS:                        7.8    26.66 )-----------( 117      ( 2022 04:53 )             24.6         143  |  112<H>  |  23  ----------------------------<  151<H>  4.8   |  19<L>  |  1.14    Ca    8.9      2022 04:53  Phos  2.9       Mg     2.1         TPro  7.6  /  Alb  2.6<L>  /  TBili  0.5  /  DBili  x   /  AST  139<H>  /  ALT  60<H>  /  AlkPhos  143<H>      PT/INR - ( 2022 23:13 )   PT: 16.1 sec;   INR: 1.34 ratio           LIVER FUNCTIONS - ( 2022 04:53 )  Alb: 2.6 g/dL / Pro: 7.6 g/dL / ALK PHOS: 143 U/L / ALT: 60 U/L / AST: 139 U/L / GGT: x           Arterial Blood Gas:   @ 10:22  7.35/38/194/21/99.5/-4.2  ABG lactate: --  Arterial Blood Gas:   @ 04:51  7.39/35/135/21/99.2/-3.4  ABG lactate: --  Arterial Blood Gas:   @ 02:15  7.38/35/140/21/98.3/-4.0  ABG lactate: --  Arterial Blood Gas:   @ 00:49  7.39/36/186/22/100.0/-2.9  ABG lactate: --  Arterial Blood Gas:   @ 22:18  7.42/35/181/23/100.0/-1.6  ABG lactate: --   Venous Blood Gas:   @ 10:22  7.30/44/56/21/76.6  VBG Lactate: 1.2  Venous Blood Gas:   @ 01:58  7.33/43/51/23/81.8  VBG Lactate: 1.1        LIVER FUNCTIONS - ( 2022 04:53 )  Alb: 2.6 g/dL / Pro: 7.6 g/dL / ALK PHOS: 143 U/L / ALT: 60 U/L / AST: 139 U/L / GGT: x             RADIOLOGY:  X Reviewed and interpreted by me no infiltrate , LVAD in place

## 2022-02-28 NOTE — CONSULT NOTE ADULT - CONSULT REASON
lethargy , SOB lethargy , SOB  Acute hypercapnic respiratory failure   Stage D systolic heart failure   S/P LVAD

## 2022-02-28 NOTE — PROGRESS NOTE ADULT - SUBJECTIVE AND OBJECTIVE BOX
RICKY HAMPTON  MRN-[id number]  Patient is a 66y old  Male who presents with a chief complaint of Septic shock (2022 19:44)    HPI:  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 s/p HM2 LVAD in 2017 at  (due to severe PAD) with TV ring, multiple GI bleeds, thus maintained off AC, CKD 3prior COVID-19 infection in 2020, recurrent syncope post LVAD s/p ILR, s/p prolonged complex hospitalization from -2021 initially admitted with abdominal pain complicated by GIB, respiratory failure s/p trach, multiple infections, s/p cholecystotomy tube and SMA stent 10/2021, ultimately discharged to [facility] rehab and then returned to [facility] for trach decannulation , readmitted in 2022 with recurrent COVID-19 infection, initially in distributive shock. Blood cultures were also positive for serratia marcescens which he has had in the past.     Pt was transferred to [facility] CICU from [facility]. He was sent to their ED from a rehab center due to AMS and tachycardia. At [facility], they noted a WBC greater than 20, tachycardia and hypercarbia on ABG. Pt was placed on BiPAP and transferred to [facility] CICU for concern for sepsis vs septic shock.      (2022 22:20)    Hospital Course:   Transferred to the CICU for further management      24 HOUR EVENTS:    REVIEW OF SYSTEMS:  CONSTITUTIONAL: No weakness, fevers or chills  EYES/ENT: No visual changes;  No vertigo or throat pain   NECK: No pain or stiffness  RESPIRATORY: No cough, wheezing, hemoptysis; No shortness of breath  CARDIOVASCULAR: No chest pain or palpitations  GASTROINTESTINAL: No abdominal or epigastric pain. No nausea, vomiting, or hematemesis; No diarrhea or constipation. No melena or hematochezia.  GENITOURINARY: No dysuria, frequency or hematuria  NEUROLOGICAL: No numbness or weakness  SKIN: No itching, rashes    ICU Vital Signs Last 24 Hrs  T(C): 36.7 (2022 20:00), Max: 38.3 (2022 05:00)  T(F): 98.1 (2022 20:00), Max: 100.9 (2022 05:00)  HR: 81 (2022 21:00) (69 - 138)  BP: --  BP(mean): 86 (2022 20:00) (86 - 86)  ABP: 83/67 (2022 21:00) (60/55 - 135/121)  ABP(mean): 74 (2022 21:00) (57 - 128)  RR: 16 (2022 21:00) (8 - 36)  SpO2: 100% (2022 21:00) (82% - 100%)      CVP(mm Hg): 0 (-22 @ 21:00) (0 - 273)  CO: --  CI: --  PA: --  PA(mean): --  PA(direct): --  PCWP: --  LA: --  RA: --  SVR: --  SVRI: --  PVR: --  PVRI: --  I&O's Summary    2022 07:01  -  2022 07:00  --------------------------------------------------------  IN: 4363.2 mL / OUT: 675 mL / NET: 3688.2 mL    2022 07:01  -  2022 21:27  --------------------------------------------------------  IN: 450.5 mL / OUT: 990 mL / NET: -539.5 mL        CAPILLARY BLOOD GLUCOSE    CAPILLARY BLOOD GLUCOSE    PHYSICAL EXAM:  Constitutional: Patient laying in bed, NAD  Head: Atraumatic, normocephalic  Eyes: No scleral icterus. PERRLA.  ENMT: Dry mucous membrane. Uvula midline. Stoma  dry appearing  Neck: Supple, No JVD  Respiratory: CTA B/L although overall decreased breath sounds 2/2 poor effort. No wheezes, rales or rhonchi   Cardiovascular: Mechanical sounds heard   Gastrointestinal: Nondistended, BSx4, soft, nontender.  Extremities: Moves all extremities. Warm, no edema, nontender  Vascular: 2+ DP/PT pulses B/L   Neurological: Awake, aphasic, not answering questions. Follows commands. B/L weakness in lower and upper extremities   Skin: No rashes on exposed skin     ============================I/O===========================   I&O's Detail    2022 07:  -  2022 07:00  --------------------------------------------------------  IN:    Albumin 5%  - 250 mL: 250 mL    IV PiggyBack: 800 mL    Lactated Ringers Bolus: 1000 mL    PRBCs (Packed Red Blood Cells): 300 mL    Sodium Chloride 0.9% Bolus: 2000 mL    Vasopressin: 13.2 mL  Total IN: 4363.2 mL    OUT:    Drain (mL): 70 mL    Voided (mL): 605 mL  Total OUT: 675 mL    Total NET: 3688.2 mL      2022 07:01  -  2022 21:27  --------------------------------------------------------  IN:    IV PiggyBack: 50 mL    Lidocaine: 52.5 mL    PRBCs (Packed Red Blood Cells): 300 mL    Vasopressin: 7.2 mL    Vasopressin: 40.8 mL  Total IN: 450.5 mL    OUT:    Drain (mL): 75 mL    Voided (mL): 915 mL  Total OUT: 990 mL    Total NET: -539.5 mL        ============================ LABS =========================                        8.0    21.54 )-----------( 87       ( 2022 20:56 )             25.2         139  |  110<H>  |  19  ----------------------------<  164<H>  4.3   |  18<L>  |  0.98    Ca    8.7      2022 15:47  Phos  2.9       Mg     2.1         TPro  7.2  /  Alb  2.3<L>  /  TBili  0.5  /  DBili  x   /  AST  82<H>  /  ALT  47<H>  /  AlkPhos  131<H>      Troponin T, High Sensitivity Result: 24 ng/L (22 @ 23:13)              LIVER FUNCTIONS - ( 2022 15:47 )  Alb: 2.3 g/dL / Pro: 7.2 g/dL / ALK PHOS: 131 U/L / ALT: 47 U/L / AST: 82 U/L / GGT: x           PT/INR - ( 2022 23:13 )   PT: 16.1 sec;   INR: 1.34 ratio           ABG - ( 2022 20:55 )  pH, Arterial: 7.33  pH, Blood: x     /  pCO2: 41    /  pO2: 181   / HCO3: 22    / Base Excess: -4.0  /  SaO2: 100.0       Urinalysis Basic - ( 2022 23:14 )    Color: Yellow / Appearance: Clear / S.040 / pH: x  Gluc: x / Ketone: see note  / Bili: see note / Urobili: see note   Blood: x / Protein: see note / Nitrite: see note   Leuk Esterase: see note / RBC: x / WBC x   Sq Epi: x / Non Sq Epi: x / Bacteria: x      ======================Micro/Rad/Cardio=================  Telemtry: Reviewed   EKG: Reviewed  CXR: Reviewed  Culture: Reviewed   Echo: Transthoracic Echocardiogram:   Patient name: RICKY HAMPTON  YOB: 1955   Age: 66 (M)   MR#: [id number]  Study Date: 2022  Location: [address] [place], [place] [id number]: Karla Sellers RDCS  Study quality: Technically difficult  Referring Physician: Peace Rodriguez MD  Blood Pressure: 78/66 mmHg  Height: 183 cm  Weight: 54 kg  BSA: 1.7 m2  ------------------------------------------------------------------------  PROCEDURE: Transthoracic echocardiogram with 2-D, M-Mode  and complete spectral and color flow Doppler.  INDICATION: Acute and subacute infective endocarditis  (I33.0)  ------------------------------------------------------------------------  Dimensions:    Normal Values:  LA:     2.8    2.0 - 4.0 cm  Ao:     3.7    2.0 - 3.8 cm  SEPTUM: 0.7    0.6 - 1.2 cm  PWT:    0.9    0.6 - 1.1 cm  LVIDd:  4.6    3.0 - 5.6 cm  LVIDs:  3.2    1.8 - 4.0 cm  Derived variables:  LVMI: 69 g/m2  RWT: 0.39  EF (Visual Estimate):  %  ------------------------------------------------------------------------  Observations:  MitralValve: Normal mitral valve.  Aortic Valve/Aorta: Calcified aortic valve which remains  closed on all observed beats.  Mild, continuous aortic regurgitation.  Normal aortic root size.  Left Atrium: Normal left atrium.  Left Ventricle: Normal left ventricular internal dimensions  and wall thicknesses.  Left ventricular assist device (LVAD) noted in the apex  with laminar flow.  Right Heart: Normal right atrium.  Right ventricular enlargement with decreased right  ventricular systolic function.  An annuloplasty ring is seen in the tricuspid position.  Mild tricuspid regurgitation.  Pericardium/Pleura: Normal pericardium with no pericardial  effusion.  Hemodynamic: No evidence of pulmonary hypertension.  ------------------------------------------------------------------------  Conclusions:  Heartmate 2 at  9200/min.  Normal left ventricular internal dimensions and wall  thicknesses.  Left ventricular assist device (LVAD) noted in the apex  with laminar flow.  Calcified aortic valve which remains closed on all observed  beats. Mild, continuous aortic regurgitation.  Right ventricular enlargement with decreased right  ventricular systolic function.  An annuloplasty ring is seen in the tricuspid position.  Mild tricuspid regurgitation.  ------------------------------------------------------------------------  Confirmed on  2022 - 13:57:46 by Logan Baeza MD, FASE  ------------------------------------------------------------------------ (22 @ 09:42)      ======================================================  PAST MEDICAL & SURGICAL HISTORY:  CHF (congestive heart failure)    CAD (coronary artery disease)    Depression    Pleural effusion    History of  novel coronavirus disease (COVID-19)  2020    Hemorrhoids    Bleeding hemorrhoids    Peripheral arterial disease    Claudication    BPH with urinary obstruction    ACC/AHA stage D systolic heart failure    Anticoagulation goal of INR 2.0 to 2.5    Falls    Clavicle fracture    CKD (chronic kidney disease), stage III    Iron deficiency anemia    H/O epistaxis    Vertigo    GI bleed    S/P TVR (tricuspid valve repair)    S/P ventricular assist device    S/P endoscopy    H/O tracheostomy      ====================ASSESSMENT ==============  65M PMH LVAD, recurrent serratia bacteremia, pneumonia, intubated/trach x2 cycles, chronic gall bladder disease s/p percutaneous cholecystectomy, SMA ischemia s/p stent, cachexia who presented from [facility] for septic shock    Plan:  ====================== NEUROLOGY=====================  AMS  - per nursing home staff pt has been altered for one week  - pt AAOx0, not verbally responding to staff, however improving this AM  - pt following commands  - continue to monitor neuro status  - CTH at OSH negative for acute pathology     dronabinol 5 milliGRAM(s) Oral two times a day before meals  gabapentin Solution 100 milliGRAM(s) Oral three times a day  mirtazapine 7.5 milliGRAM(s) Oral at bedtime  sertraline 100 milliGRAM(s) Oral daily    ==================== RESPIRATORY======================  Hypercarbia  - Pt hypercarbic at OSH, placed on BiPAP   - Weaned BiPAP after admission to NC with ABG stable  - continue to monitor respiratory status   - wean NC as tolerated with goal SpO2 >94%    Hx trach w/ stoma  - Pt trach x2 cycles in past  - most recently in setting of COVID PNA in 2022  - Pt decannulated after admission  - purulent drainage in stoma site, cultured pending results  - will consult ENT today to eval    ====================CARDIOVASCULAR==================  ACC/AHA stage D systolic heart failure s/p LVAD  - pt appeared hypovolemic on admission  - c/w Vaso gtt for HD instability, wean as tolerated with goal SBP >100  - s/p 1L NS, 250 albumin x1, 2L LR overnight. Fluid responsive- able to wean pressors with fluid overnight. Will give additional fluid prn with goal CVP 6-8. Will give additional 1L LR now.   - Hgb 7, s/p 1U pRBC overnight with incomplete response. continue to monitor    - central sat sent from intro 81.8 with negative lactate x4  - LVAD coordinator and HF attending contacted     Ventricular Tachycardia  - pt has history of VT on previous admissions, takes home mexiletine  - NSVT seen on tele in CICU  - lido gtt @1   - transition to mexiletine when able to take PO    vasopressin Infusion 0.06 Unit(s)/Min (3.6 mL/Hr) IV Continuous <Continuous>  ===================HEMATOLOGIC/ONC ===================  Anemia  - Hgb on admission 7.4 with subsequent drop to 6.6  - s/p 1u PRBC with incomplete response. will monitor cbc q6hr  - of note, pt has hx of GI bleeds  - will assess BM when able   - transfuse as needed if hgb <7    ===================== RENAL =========================  RASHAWN  - SCr 1.3 (baseline 0.8-0.9)  - continue to monitor SCr, BUN, lytes, and I&Os  - replete lytes prn with goal K 4-4.5 and mg >2    ==================== GASTROINTESTINAL===================  multiple transfusions   - no overt bleeding noted on this admission   - monitor BM for signs of blood    cholecalciferol 1000 Unit(s) Oral daily  metoclopramide 10 milliGRAM(s) Oral four times a day  multivitamin 1 Tablet(s) Oral daily  pantoprazole    Tablet 40 milliGRAM(s) Oral before breakfast  senna 2 Tablet(s) Oral at bedtime    =======================    ENDOCRINE  =====================  Hx Hyperthyroidism   - continue home methimazole   - pending TSH     methimazole 10 milliGRAM(s) Oral daily    ========================INFECTIOUS DISEASE================  Septic shock  - pt arrived tachycardic 120s, hypotensive MAPs 50-60, AMS  - WBC 27  - pt got zosyn and vancomycin x1 at OSH. Will switch zosyn to cefepime and continue with vanc by level.   - f/u infectious workup   - stoma with purulent pus on presentation- culture sent. This AM dry appearing without erythema. ENT consulted   - CT C/A/P at OSH: cholecystostomy in place, patchy opacities vs. atelectasis as per written transfer report (CD and official report not sent with transfer)     cefepime   IVPB 2000 milliGRAM(s) IV Intermittent every 12 hours    Patient requires continuous monitoring with bedside rhythm monitoring, pulse ox monitoring, and intermittent blood gas analysis. Care plan discussed with ICU care team. Patient remained critical and at risk for life threatening decompensation.  Patient seen, examined and plan discussed with CCU team during rounds.     I have personally provided ____ minutes of critical care time excluding time spent on separate procedures, in addition to initial critical care time provided by the CICU Attending, Dr. Jensen    By signing my name below, I, London Sam, attest that this documentation has been prepared under the direction and in the presence of Leann Cronin NP   Electronically signed: Cesia Price, 22 @ 21:27    I, Leann Cronin NP , personally performed the services described in this documentation. all medical record entries made by the luisibmel were at my direction and in my presence. I have reviewed the chart and agree that the record reflects my personal performance and is accurate and complete  Electronically signed: Leann Cronin NP

## 2022-02-28 NOTE — CONSULT NOTE ADULT - ASSESSMENT
66 years old male with PMHx of Stage D HF 2/2 NICM s/p HM2 LVAD in 9/2017 at  (due to severe PAD) with TV ring, multiple GI bleeds, no AC, CKD 3 prior COVID-19 infection in 4/2020, chronic cholecystitis s/p percutaneous cholecystostomy tube, recurrent COVID infection, Serratia bacteremia transferred from [facility] for sepsis.      ID is consulted for septic shock  Recently had Serratia bacteremia discharged on suppressive PO Levaquin  Returned with leukocytosis, fever, AMS, hypotension requiring pressors  ?purulent sputum coming out from previous trach site  CXR no PNA  CTH unrevealing  LVAD exit sites shows no signs of infection  Perc dawn tube site shows no signs of infection    Antibiotics:  Vancomycin 2/28 -   Zosyn 2/28  Cefepime 2/28 -     Currently unclear etiology of septic shock, recurrent bacteremia is high on differentials (was on Levaquin for Serratia bacteremia but it is resistant, mostly due to lack of other PO options and try to prevent PICC line infection)  DDx pneumonia, tracheitis, intra-abdominal infection?  Would continue current abx  Will need CT chest, A/P to rule out other focus  Detailed discussion with CICU attending Dr. Jensen, patient likely would have worsening respiratory status but due to his multiple comorbidities and previous intubation, it is futile to escalate care to intubation as patient will likely unable to be weaned off ventilator.      IMPRESSION:  Septic shock  Leukocytosis  Fever  Hx Serratia bacteremia  LVAD      RECOMMENDATIONS:  - Continue IV vancomycin 750mg q24hrs and IV cefepime 2gm q12hrs  - Monitor vancomycin trough 30 minutes prior 4th dose, keep trough around 10-15; monitor Cr daily  - Follow up blood cultures and secretion culture from [facility] site  - Obtain blood cultures from [facility] as well  - CT chest and abdomen/pelvis  - Trend WBC, fever curve, transaminases, creatinine daily  - Will continue to follow  - GOC discussion  - Guarded prognosis      Patient is seen and examined with attending and case is discussed with CICU team      Marisa Hall DO  PGY4 ID fellow  Please contact me via page or text through [employer] Teams  If after 5PM or on weekends, please call [phone]

## 2022-02-28 NOTE — CONSULT NOTE ADULT - ASSESSMENT
67 y/o male pt with right heel DTI present on admission  - pt seen and evaluated   - right heel DTI with no signs of infection noted  - rec z flow boots in bed at all times  - rec applying cavilon to right heel daily  - will monitor periodically during this admission, reconsult sooner as necessary

## 2022-02-28 NOTE — PROGRESS NOTE ADULT - SUBJECTIVE AND OBJECTIVE BOX
RICKY HAMPTON  MRN-[id number]  Patient is a 66y old  Male who presents with a chief complaint of Septic shock (2022 03:33)    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 s/p HM2 LVAD in 2017 at  (due to severe PAD) with TV ring, multiple GI bleeds, thus maintained off AC, CKD 3prior COVID-19 infection in 2020, recurrent syncope post LVAD s/p ILR, s/p prolonged complex hospitalization from -2021 initially admitted with abdominal pain complicated by GIB, respiratory failure s/p trach, multiple infections, s/p cholecystotomy tube and SMA stent 10/2021, ultimately discharged to [facility] rehab and then returned to [facility] for trach decannulation , readmitted in 2022 with recurrent COVID-19 infection, initially in distributive shock. Blood cultures were also positive for serratia marcescens which he has had in the past.     Pt was transferred to [facility] CICU from [facility]. He was sent to their ED from a rehab center due to AMS and tachycardia. At [facility], they noted a WBC greater than 20, tachycardia and hypercarbia on ABG. Pt was placed on BiPAP and transferred to [facility] CICU for concern for sepsis vs septic shock.      (2022 22:20)      Hospital course:    Today:    Physical Exam:  Vital Signs Last 24 Hrs  T(C): 38.3 (2022 05:00), Max: 38.3 (2022 05:00)  T(F): 100.9 (2022 05:00), Max: 100.9 (2022 05:00)  HR: 79 (2022 06:15) (79 - 135)  BP: --  BP(mean): 72 (2022 21:20) (72 - 72)  RR: 9 (2022 06:15) (9 - 36)  SpO2: 91% (2022 06:15) (89% - 100%)    PHYSICAL EXAM:  Constitutional: Patient laying in bed, NAD  Head: Atraumatic, normocephalic  Eyes: No scleral icterus. PERRLA. EOMI  ENMT: Moist mucous membrane. Uvula midline  Neck: Supple, No JVD  Respiratory: CTA B/L. No wheezes, rales or rhonchi   Cardiovascular: S1/S2. No murmurs, rubs, or gallops.  Gastrointestinal: Nondistended, BSx4, soft, nontender.  Extremities: Moves all extremities. Warm, no edema, nontender  Vascular: 2+ DP/PT pulses B/L   Neurological: A&Ox3. Follows commands. No focal deficits.   Skin: No rashes on exposed skin     ============================I/O===========================   I&O's Detail    2022 07:01  -  2022 07:00  --------------------------------------------------------  IN:    Albumin 5%  - 250 mL: 250 mL    IV PiggyBack: 800 mL    Lactated Ringers Bolus: 1000 mL    PRBCs (Packed Red Blood Cells): 300 mL    Sodium Chloride 0.9% Bolus: 2000 mL    Vasopressin: 13.2 mL  Total IN: 4363.2 mL    OUT:    Drain (mL): 70 mL    Voided (mL): 605 mL  Total OUT: 675 mL    Total NET: 3688.2 mL        ============================ LABS =========================                        7.8    26.66 )-----------( 117      ( 2022 04:53 )             24.6     02-    143  |  112<H>  |  23  ----------------------------<  151<H>  4.8   |  19<L>  |  1.14    Ca    8.9      2022 04:53  Phos  2.9     02-28  Mg     2.1         TPro  7.6  /  Alb  2.6<L>  /  TBili  0.5  /  DBili  x   /  AST  139<H>  /  ALT  60<H>  /  AlkPhos  143<H>      LIVER FUNCTIONS - ( 2022 04:53 )  Alb: 2.6 g/dL / Pro: 7.6 g/dL / ALK PHOS: 143 U/L / ALT: 60 U/L / AST: 139 U/L / GGT: x           PT/INR - ( 2022 23:13 )   PT: 16.1 sec;   INR: 1.34 ratio           ABG - ( 2022 04:51 )  pH, Arterial: 7.39  pH, Blood: x     /  pCO2: 35    /  pO2: 135   / HCO3: 21    / Base Excess: -3.4  /  SaO2: 99.2              Urinalysis Basic - ( 2022 23:14 )    Color: Yellow / Appearance: Clear / S.040 / pH: x  Gluc: x / Ketone: see note  / Bili: see note / Urobili: see note   Blood: x / Protein: see note / Nitrite: see note   Leuk Esterase: see note / RBC: x / WBC x   Sq Epi: x / Non Sq Epi: x / Bacteria: x      ======================Micro/Rad/Cardio=================  Culture: Reviewed   CXR: Reviewed  Echo:Reviewed  ======================================================  PAST MEDICAL & SURGICAL HISTORY:  CHF (congestive heart failure)    CAD (coronary artery disease)    Depression    Pleural effusion    History of 2019 novel coronavirus disease (COVID-19)  2020    Hemorrhoids    Bleeding hemorrhoids    Peripheral arterial disease    Claudication    BPH with urinary obstruction    ACC/AHA stage D systolic heart failure    Anticoagulation goal of INR 2.0 to 2.5    Falls    Clavicle fracture    CKD (chronic kidney disease), stage III    Iron deficiency anemia    H/O epistaxis    Vertigo    GI bleed    S/P TVR (tricuspid valve repair)    S/P ventricular assist device    S/P endoscopy    H/O tracheostomy      ====================ASSESSMENT ==============  65M PMH LVAD, recurrent serratia bacteremia, pneumonia, intubated/trach x2 cycles, chronic gall bladder disease s/p percutaneous cholecystectomy, SMA ischemia s/p stent, cachexia who presented from [facility] for septic shock    Plan:  ====================== NEUROLOGY=====================  AMS  - per nursing home staff pt has been altered for one week  - pt AAOx0, not verbally responding to staff, however improving this AM  - pt following commands  - continue to monitor neuro status    ==================== RESPIRATORY======================  Hypercarbia  - Pt hypercarbic at OSH, placed on BiPAP   - Weaned BiPAP after admission to NC with ABG stable  - continue to monitor respiratory status   - wean NC as tolerated with goal SpO2 >94%    Hx trach w/ stoma  - Pt trach x2 cycles in past  - most recently in setting of COVID PNA in 2022  - Pt decannulated after admission  - purulent drainage in stoma site, cultured pending results  - will consult ENT today to eval    ====================CARDIOVASCULAR==================  ACC/AHA stage D systolic heart failure s/p LVAD  - pt appeared hypovolemic on admission  - c/w Vaso gtt for HD instability, wean as tolerated with goal SBP >100  - s/p 1L NS, 250 albumin x1, 2L LR overnight. Fluid responsive- able to wean pressors with fluid overnight. Will give additional fluid prn with goal CVP 6-8. Will give additional 1L LR now.   - Hgb 7, s/p 1U pRBC overnight with incomplete response. continue to monitor    - central sat sent from intro 80%   - LVAD coordinator and HF attending contacted     Ventricular Tachycardia  - pt has history of VT on previous admissions, takes home mexiletine  - NSVT seen on tele in CICU  - lido gtt @1   - transition to mexiletine when able to take PO    ===================HEMATOLOGIC/ONC ===================  Anemia  - Hgb on admission 7.4 with subsequent drop to 6.6  - s/p 1u PRBC with incomplete response. will monitor cbc q6hr  - of note, pt has hx of GI bleeds  - will assess BM when able   - transfuse as needed if hgb <7      ===================== RENAL =========================  RASHAWN  - SCr 1.3 (baseline 0.8-0.9)  - continue to monitor SCr, BUN, lytes, and I&Os  - replete lytes prn with goal K 4-4.5 and mg >2      ==================== GASTROINTESTINAL===================\  Hx GI bleed in past req multiple transfusions   - no overt bleeding noted on this admission   - monitor BM for signs of blood       albumin human  5% IVPB 250 milliLiter(s) IV Intermittent once  potassium chloride  20 mEq/100 mL IVPB 20 milliEquivalent(s) IV Intermittent every 2 hours    =======================    ENDOCRINE  =====================  Hx Hyperthyroidism   - continue home methimazole   - pending TSH     methimazole 10 milliGRAM(s) Oral daily    ========================INFECTIOUS DISEASE================  Septic shock  - pt arrived tachycardic 120s, hypotensive MAPs 50-60, AMS  - WBC 27  - pt got zosyn and vancomycin x1 at OSH. Will switch zosyn to cefepime and continue with vanc by level.   - f/u infectious workup         Patient requires continuous monitoring with bedside rhythm monitoring, arterial line, pulse oximetry, ventilator monitoring and intermittent blood gas analysis.  Care plan discussed with ICU care team.  Patient remained critical; required more than usual post op care; I have spent >45 minutes providing non-routine post op care, in addition to initial critical time provided by CICU attending Dr. Jensen , re-evaluated multiple times during the day.         RICKY HAMPTON  MRN-[id number]  Patient is a 66y old  Male who presents with a chief complaint of Septic shock (2022 03:33)    HPI:  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 s/p HM2 LVAD in 2017 at  (due to severe PAD) with TV ring, multiple GI bleeds, thus maintained off AC, CKD 3prior COVID-19 infection in 2020, recurrent syncope post LVAD s/p ILR, s/p prolonged complex hospitalization from -2021 initially admitted with abdominal pain complicated by GIB, respiratory failure s/p trach, multiple infections, s/p cholecystotomy tube and SMA stent 10/2021, ultimately discharged to [facility] rehab and then returned to [facility] for trach decannulation , readmitted in 2022 with recurrent COVID-19 infection, initially in distributive shock. Blood cultures were also positive for serratia marcescens which he has had in the past.     Pt was transferred to [facility] CICU from [facility]. He was sent to their ED from a rehab center due to AMS and tachycardia. At [facility], they noted a WBC greater than 20, tachycardia and hypercarbia on ABG. Pt was placed on BiPAP and transferred to [facility] CICU for concern for sepsis vs septic shock.      (2022 22:20)    Today:  ENT and ID consulted  1L LR    Physical Exam:  Vital Signs Last 24 Hrs  T(C): 38.3 (2022 05:00), Max: 38.3 (2022 05:00)  T(F): 100.9 (2022 05:00), Max: 100.9 (2022 05:00)  HR: 79 (2022 06:15) (79 - 135)  BP: --  BP(mean): 72 (2022 21:20) (72 - 72)  RR: 9 (2022 06:15) (9 - 36)  SpO2: 91% (2022 06:15) (89% - 100%)    PHYSICAL EXAM:  Constitutional: Patient laying in bed, NAD  Head: Atraumatic, normocephalic  Eyes: No scleral icterus. PERRLA. EOMI  ENMT: Moist mucous membrane. Uvula midline  Neck: Supple, No JVD  Respiratory: CTA B/L. No wheezes, rales or rhonchi   Cardiovascular: S1/S2. No murmurs, rubs, or gallops.  Gastrointestinal: Nondistended, BSx4, soft, nontender.  Extremities: Moves all extremities. Warm, no edema, nontender  Vascular: 2+ DP/PT pulses B/L   Neurological: A&Ox3. Follows commands. No focal deficits.   Skin: No rashes on exposed skin     ============================I/O===========================   I&O's Detail    2022 07:01  -  2022 07:00  --------------------------------------------------------  IN:    Albumin 5%  - 250 mL: 250 mL    IV PiggyBack: 800 mL    Lactated Ringers Bolus: 1000 mL    PRBCs (Packed Red Blood Cells): 300 mL    Sodium Chloride 0.9% Bolus: 2000 mL    Vasopressin: 13.2 mL  Total IN: 4363.2 mL    OUT:    Drain (mL): 70 mL    Voided (mL): 605 mL  Total OUT: 675 mL    Total NET: 3688.2 mL        ============================ LABS =========================                        7.8    26.66 )-----------( 117      ( 2022 04:53 )             24.6     02-28    143  |  112<H>  |  23  ----------------------------<  151<H>  4.8   |  19<L>  |  1.14    Ca    8.9      2022 04:53  Phos  2.9       Mg     2.1         TPro  7.6  /  Alb  2.6<L>  /  TBili  0.5  /  DBili  x   /  AST  139<H>  /  ALT  60<H>  /  AlkPhos  143<H>      LIVER FUNCTIONS - ( 2022 04:53 )  Alb: 2.6 g/dL / Pro: 7.6 g/dL / ALK PHOS: 143 U/L / ALT: 60 U/L / AST: 139 U/L / GGT: x           PT/INR - ( 2022 23:13 )   PT: 16.1 sec;   INR: 1.34 ratio           ABG - ( 2022 04:51 )  pH, Arterial: 7.39  pH, Blood: x     /  pCO2: 35    /  pO2: 135   / HCO3: 21    / Base Excess: -3.4  /  SaO2: 99.2              Urinalysis Basic - ( 2022 23:14 )    Color: Yellow / Appearance: Clear / S.040 / pH: x  Gluc: x / Ketone: see note  / Bili: see note / Urobili: see note   Blood: x / Protein: see note / Nitrite: see note   Leuk Esterase: see note / RBC: x / WBC x   Sq Epi: x / Non Sq Epi: x / Bacteria: x      ======================Micro/Rad/Cardio=================  Culture: Reviewed   CXR: Reviewed  Echo:Reviewed  ======================================================  PAST MEDICAL & SURGICAL HISTORY:  CHF (congestive heart failure)    CAD (coronary artery disease)    Depression    Pleural effusion    History of 2019 novel coronavirus disease (COVID-19)  2020    Hemorrhoids    Bleeding hemorrhoids    Peripheral arterial disease    Claudication    BPH with urinary obstruction    ACC/AHA stage D systolic heart failure    Anticoagulation goal of INR 2.0 to 2.5    Falls    Clavicle fracture    CKD (chronic kidney disease), stage III    Iron deficiency anemia    H/O epistaxis    Vertigo    GI bleed    S/P TVR (tricuspid valve repair)    S/P ventricular assist device    S/P endoscopy    H/O tracheostomy      ====================ASSESSMENT ==============  65M PMH LVAD, recurrent serratia bacteremia, pneumonia, intubated/trach x2 cycles, chronic gall bladder disease s/p percutaneous cholecystectomy, SMA ischemia s/p stent, cachexia who presented from [facility] for septic shock    Plan:  ====================== NEUROLOGY=====================  AMS  - per nursing home staff pt has been altered for one week  - pt AAOx0, not verbally responding to staff, however improving this AM  - pt following commands  - continue to monitor neuro status  - CTH at OSH negative for acute pathology     ==================== RESPIRATORY======================  Hypercarbia  - Pt hypercarbic at OSH, placed on BiPAP   - Weaned BiPAP after admission to NC with ABG stable  - continue to monitor respiratory status   - wean NC as tolerated with goal SpO2 >94%    Hx trach w/ stoma  - Pt trach x2 cycles in past  - most recently in setting of COVID PNA in 2022  - Pt decannulated after admission  - purulent drainage in stoma site, cultured pending results  - will consult ENT today to eval    ====================CARDIOVASCULAR==================  ACC/AHA stage D systolic heart failure s/p LVAD  - pt appeared hypovolemic on admission  - c/w Vaso gtt for HD instability, wean as tolerated with goal SBP >100  - s/p 1L NS, 250 albumin x1, 2L LR overnight. Fluid responsive- able to wean pressors with fluid overnight. Will give additional fluid prn with goal CVP 6-8. Will give additional 1L LR now.   - Hgb 7, s/p 1U pRBC overnight with incomplete response. continue to monitor    - central sat sent from [facility] 80%   - LVAD coordinator and HF attending contacted     Ventricular Tachycardia  - pt has history of VT on previous admissions, takes home mexiletine  - NSVT seen on tele in CICU  - lido gtt @1   - transition to mexiletine when able to take PO    ===================HEMATOLOGIC/ONC ===================  Anemia  - Hgb on admission 7.4 with subsequent drop to 6.6  - s/p 1u PRBC with incomplete response. will monitor cbc q6hr  - of note, pt has hx of GI bleeds  - will assess BM when able   - transfuse as needed if hgb <7      ===================== RENAL =========================  RASHAWN  - SCr 1.3 (baseline 0.8-0.9)  - continue to monitor SCr, BUN, lytes, and I&Os  - replete lytes prn with goal K 4-4.5 and mg >2      ==================== GASTROINTESTINAL===================\  Hx GI bleed in past req multiple transfusions   - no overt bleeding noted on this admission   - monitor BM for signs of blood       albumin human  5% IVPB 250 milliLiter(s) IV Intermittent once  potassium chloride  20 mEq/100 mL IVPB 20 milliEquivalent(s) IV Intermittent every 2 hours    =======================    ENDOCRINE  =====================  Hx Hyperthyroidism   - continue home methimazole   - pending TSH     methimazole 10 milliGRAM(s) Oral daily    ========================INFECTIOUS DISEASE================  Septic shock  - pt arrived tachycardic 120s, hypotensive MAPs 50-60, AMS  - WBC 27  - pt got zosyn and vancomycin x1 at OSH. Will switch zosyn to cefepime and continue with vanc by level.   - f/u infectious workup   - CT C/A/P at OSH: cholecystostomy in place, patchy opacities vs. atelectasis as per written transfer report (CD and official report not sent with transfer)         Patient requires continuous monitoring with bedside rhythm monitoring, arterial line, pulse oximetry, ventilator monitoring and intermittent blood gas analysis.  Care plan discussed with ICU care team.  Patient remained critical; required more than usual post op care; I have spent >45 minutes providing non-routine post op care, in addition to initial critical time provided by CICU attending Dr. Jensen , re-evaluated multiple times during the day.         RICKY HAMPTON  MRN-[id number]  Patient is a 66y old  Male who presents with a chief complaint of Septic shock (2022 03:33)    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 s/p HM2 LVAD in 2017 at  (due to severe PAD) with TV ring, multiple GI bleeds, thus maintained off AC, CKD 3prior COVID-19 infection in 2020, recurrent syncope post LVAD s/p ILR, s/p prolonged complex hospitalization from -2021 initially admitted with abdominal pain complicated by GIB, respiratory failure s/p trach, multiple infections, s/p cholecystotomy tube and SMA stent 10/2021, ultimately discharged to [facility] rehab and then returned to [facility] for trach decannulation , readmitted in 2022 with recurrent COVID-19 infection, initially in distributive shock. Blood cultures were also positive for serratia marcescens which he has had in the past.     Pt was transferred to [facility] CICU from [facility]. He was sent to their ED from a rehab center due to AMS and tachycardia. At [facility], they noted a WBC greater than 20, tachycardia and hypercarbia on ABG. Pt was placed on BiPAP and transferred to [facility] CICU for concern for sepsis vs septic shock.      (2022 22:20)    Today:  ENT and ID consulted  1L LR    Physical Exam:  Vital Signs Last 24 Hrs  T(C): 38.3 (2022 05:00), Max: 38.3 (2022 05:00)  T(F): 100.9 (2022 05:00), Max: 100.9 (2022 05:00)  HR: 79 (2022 06:15) (79 - 135)  BP: --  BP(mean): 72 (2022 21:20) (72 - 72)  RR: 9 (2022 06:15) (9 - 36)  SpO2: 91% (2022 06:15) (89% - 100%)    PHYSICAL EXAM:  Constitutional: Patient laying in bed, NAD  Head: Atraumatic, normocephalic  Eyes: No scleral icterus. PERRLA.  ENMT: Dry mucous membrane. Uvula midline. Stoma  dry appearing  Neck: Supple, No JVD  Respiratory: CTA B/L although overall decreased breath sounds 2/2 poor effort. No wheezes, rales or rhonchi   Cardiovascular: Mechanical sounds heard   Gastrointestinal: Nondistended, BSx4, soft, nontender.  Extremities: Moves all extremities. Warm, no edema, nontender  Vascular: 2+ DP/PT pulses B/L   Neurological: Awake, aphasic, not answering questions. Follows commands. B/L weakness in lower and upper extremities   Skin: No rashes on exposed skin     ============================I/O===========================   I&O's Detail    2022 07:01  -  2022 07:00  --------------------------------------------------------  IN:    Albumin 5%  - 250 mL: 250 mL    IV PiggyBack: 800 mL    Lactated Ringers Bolus: 1000 mL    PRBCs (Packed Red Blood Cells): 300 mL    Sodium Chloride 0.9% Bolus: 2000 mL    Vasopressin: 13.2 mL  Total IN: 4363.2 mL    OUT:    Drain (mL): 70 mL    Voided (mL): 605 mL  Total OUT: 675 mL    Total NET: 3688.2 mL        ============================ LABS =========================                        7.8    26.66 )-----------( 117      ( 2022 04:53 )             24.6     -    143  |  112<H>  |  23  ----------------------------<  151<H>  4.8   |  19<L>  |  1.14    Ca    8.9      2022 04:53  Phos  2.9       Mg     2.1         TPro  7.6  /  Alb  2.6<L>  /  TBili  0.5  /  DBili  x   /  AST  139<H>  /  ALT  60<H>  /  AlkPhos  143<H>      LIVER FUNCTIONS - ( 2022 04:53 )  Alb: 2.6 g/dL / Pro: 7.6 g/dL / ALK PHOS: 143 U/L / ALT: 60 U/L / AST: 139 U/L / GGT: x           PT/INR - ( 2022 23:13 )   PT: 16.1 sec;   INR: 1.34 ratio           ABG - ( 2022 04:51 )  pH, Arterial: 7.39  pH, Blood: x     /  pCO2: 35    /  pO2: 135   / HCO3: 21    / Base Excess: -3.4  /  SaO2: 99.2              Urinalysis Basic - ( 2022 23:14 )    Color: Yellow / Appearance: Clear / S.040 / pH: x  Gluc: x / Ketone: see note  / Bili: see note / Urobili: see note   Blood: x / Protein: see note / Nitrite: see note   Leuk Esterase: see note / RBC: x / WBC x   Sq Epi: x / Non Sq Epi: x / Bacteria: x      ======================Micro/Rad/Cardio=================  Culture: Reviewed   CXR: Reviewed  Echo:Reviewed  ======================================================  PAST MEDICAL & SURGICAL HISTORY:  CHF (congestive heart failure)    CAD (coronary artery disease)    Depression    Pleural effusion    History of 2019 novel coronavirus disease (COVID-19)  2020    Hemorrhoids    Bleeding hemorrhoids    Peripheral arterial disease    Claudication    BPH with urinary obstruction    ACC/AHA stage D systolic heart failure    Anticoagulation goal of INR 2.0 to 2.5    Falls    Clavicle fracture    CKD (chronic kidney disease), stage III    Iron deficiency anemia    H/O epistaxis    Vertigo    GI bleed    S/P TVR (tricuspid valve repair)    S/P ventricular assist device    S/P endoscopy    H/O tracheostomy      ====================ASSESSMENT ==============  65M PMH LVAD, recurrent serratia bacteremia, pneumonia, intubated/trach x2 cycles, chronic gall bladder disease s/p percutaneous cholecystectomy, SMA ischemia s/p stent, cachexia who presented from [facility] for septic shock    Plan:  ====================== NEUROLOGY=====================  AMS  - per nursing home staff pt has been altered for one week  - pt AAOx0, not verbally responding to staff, however improving this AM  - pt following commands  - continue to monitor neuro status  - CTH at OSH negative for acute pathology     ==================== RESPIRATORY======================  Hypercarbia  - Pt hypercarbic at OSH, placed on BiPAP   - Weaned BiPAP after admission to NC with ABG stable  - continue to monitor respiratory status   - wean NC as tolerated with goal SpO2 >94%    Hx trach w/ stoma  - Pt trach x2 cycles in past  - most recently in setting of COVID PNA in 2022  - Pt decannulated after admission  - purulent drainage in stoma site, cultured pending results  - will consult ENT today to eval    ====================CARDIOVASCULAR==================  ACC/AHA stage D systolic heart failure s/p LVAD  - pt appeared hypovolemic on admission  - c/w Vaso gtt for HD instability, wean as tolerated with goal SBP >100  - s/p 1L NS, 250 albumin x1, 2L LR overnight. Fluid responsive- able to wean pressors with fluid overnight. Will give additional fluid prn with goal CVP 6-8. Will give additional 1L LR now.   - Hgb 7, s/p 1U pRBC overnight with incomplete response. continue to monitor    - central sat sent from intro 81.8 with negative lactate x4  - LVAD coordinator and HF attending contacted     Ventricular Tachycardia  - pt has history of VT on previous admissions, takes home mexiletine  - NSVT seen on tele in CICU  - lido gtt @1   - transition to mexiletine when able to take PO    ===================HEMATOLOGIC/ONC ===================  Anemia  - Hgb on admission 7.4 with subsequent drop to 6.6  - s/p 1u PRBC with incomplete response. will monitor cbc q6hr  - of note, pt has hx of GI bleeds  - will assess BM when able   - transfuse as needed if hgb <7      ===================== RENAL =========================  RASHAWN  - SCr 1.3 (baseline 0.8-0.9)  - continue to monitor SCr, BUN, lytes, and I&Os  - replete lytes prn with goal K 4-4.5 and mg >2      ==================== GASTROINTESTINAL===================\  Hx GI bleed in past req multiple transfusions   - no overt bleeding noted on this admission   - monitor BM for signs of blood       albumin human  5% IVPB 250 milliLiter(s) IV Intermittent once  potassium chloride  20 mEq/100 mL IVPB 20 milliEquivalent(s) IV Intermittent every 2 hours    =======================    ENDOCRINE  =====================  Hx Hyperthyroidism   - continue home methimazole   - pending TSH     methimazole 10 milliGRAM(s) Oral daily    ========================INFECTIOUS DISEASE================  Septic shock  - pt arrived tachycardic 120s, hypotensive MAPs 50-60, AMS  - WBC 27  - pt got zosyn and vancomycin x1 at OSH. Will switch zosyn to cefepime and continue with vanc by level.   - f/u infectious workup   - CT C/A/P at OSH: cholecystostomy in place, patchy opacities vs. atelectasis as per written transfer report (CD and official report not sent with transfer)         Patient requires continuous monitoring with bedside rhythm monitoring, arterial line, pulse oximetry, ventilator monitoring and intermittent blood gas analysis.  Care plan discussed with ICU care team.  Patient remained critical; required more than usual post op care; I have spent >45 minutes providing non-routine post op care, in addition to initial critical time provided by CICU attending Dr. Jensen , re-evaluated multiple times during the day.         RICKY HAMPTON  MRN-[id number]  Patient is a 66y old  Male who presents with a chief complaint of Septic shock (2022 03:33)    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 s/p HM2 LVAD in 2017 at  (due to severe PAD) with TV ring, multiple GI bleeds, thus maintained off AC, CKD 3prior COVID-19 infection in 2020, recurrent syncope post LVAD s/p ILR, s/p prolonged complex hospitalization from -2021 initially admitted with abdominal pain complicated by GIB, respiratory failure s/p trach, multiple infections, s/p cholecystotomy tube and SMA stent 10/2021, ultimately discharged to [facility] rehab and then returned to [facility] for trach decannulation , readmitted in 2022 with recurrent COVID-19 infection, initially in distributive shock. Blood cultures were also positive for serratia marcescens which he has had in the past.     Pt was transferred to [facility] CICU from [facility]. He was sent to their ED from a rehab center due to AMS and tachycardia. At [facility], they noted a WBC greater than 20, tachycardia and hypercarbia on ABG. Pt was placed on BiPAP and transferred to [facility] CICU for concern for sepsis vs septic shock.      (2022 22:20)    Today:  ENT and ID consulted  1L LR    Physical Exam:  Vital Signs Last 24 Hrs  T(C): 38.3 (2022 05:00), Max: 38.3 (2022 05:00)  T(F): 100.9 (2022 05:00), Max: 100.9 (2022 05:00)  HR: 79 (2022 06:15) (79 - 135)  BP: --  BP(mean): 72 (2022 21:20) (72 - 72)  RR: 9 (2022 06:15) (9 - 36)  SpO2: 91% (2022 06:15) (89% - 100%)    PHYSICAL EXAM:  Constitutional: Patient laying in bed, NAD  Head: Atraumatic, normocephalic  Eyes: No scleral icterus. PERRLA.  ENMT: Dry mucous membrane. Uvula midline. Stoma  dry appearing  Neck: Supple, No JVD  Respiratory: CTA B/L although overall decreased breath sounds 2/2 poor effort. No wheezes, rales or rhonchi   Cardiovascular: Mechanical sounds heard   Gastrointestinal: Nondistended, BSx4, soft, nontender.  Extremities: Moves all extremities. Warm, no edema, nontender  Vascular: 2+ DP/PT pulses B/L   Neurological: Awake, aphasic, not answering questions. Follows commands. B/L weakness in lower and upper extremities   Skin: No rashes on exposed skin     ============================I/O===========================   I&O's Detail    2022 07:01  -  2022 07:00  --------------------------------------------------------  IN:    Albumin 5%  - 250 mL: 250 mL    IV PiggyBack: 800 mL    Lactated Ringers Bolus: 1000 mL    PRBCs (Packed Red Blood Cells): 300 mL    Sodium Chloride 0.9% Bolus: 2000 mL    Vasopressin: 13.2 mL  Total IN: 4363.2 mL    OUT:    Drain (mL): 70 mL    Voided (mL): 605 mL  Total OUT: 675 mL    Total NET: 3688.2 mL        ============================ LABS =========================                        7.8    26.66 )-----------( 117      ( 2022 04:53 )             24.6     -    143  |  112<H>  |  23  ----------------------------<  151<H>  4.8   |  19<L>  |  1.14    Ca    8.9      2022 04:53  Phos  2.9       Mg     2.1         TPro  7.6  /  Alb  2.6<L>  /  TBili  0.5  /  DBili  x   /  AST  139<H>  /  ALT  60<H>  /  AlkPhos  143<H>      LIVER FUNCTIONS - ( 2022 04:53 )  Alb: 2.6 g/dL / Pro: 7.6 g/dL / ALK PHOS: 143 U/L / ALT: 60 U/L / AST: 139 U/L / GGT: x           PT/INR - ( 2022 23:13 )   PT: 16.1 sec;   INR: 1.34 ratio           ABG - ( 2022 04:51 )  pH, Arterial: 7.39  pH, Blood: x     /  pCO2: 35    /  pO2: 135   / HCO3: 21    / Base Excess: -3.4  /  SaO2: 99.2              Urinalysis Basic - ( 2022 23:14 )    Color: Yellow / Appearance: Clear / S.040 / pH: x  Gluc: x / Ketone: see note  / Bili: see note / Urobili: see note   Blood: x / Protein: see note / Nitrite: see note   Leuk Esterase: see note / RBC: x / WBC x   Sq Epi: x / Non Sq Epi: x / Bacteria: x      ======================Micro/Rad/Cardio=================  Culture: Reviewed   CXR: Reviewed  Echo:Reviewed  ======================================================  PAST MEDICAL & SURGICAL HISTORY:  CHF (congestive heart failure)    CAD (coronary artery disease)    Depression    Pleural effusion    History of 2019 novel coronavirus disease (COVID-19)  2020    Hemorrhoids    Bleeding hemorrhoids    Peripheral arterial disease    Claudication    BPH with urinary obstruction    ACC/AHA stage D systolic heart failure    Anticoagulation goal of INR 2.0 to 2.5    Falls    Clavicle fracture    CKD (chronic kidney disease), stage III    Iron deficiency anemia    H/O epistaxis    Vertigo    GI bleed    S/P TVR (tricuspid valve repair)    S/P ventricular assist device    S/P endoscopy    H/O tracheostomy      ====================ASSESSMENT ==============  65M PMH LVAD, recurrent serratia bacteremia, pneumonia, intubated/trach x2 cycles, chronic gall bladder disease s/p percutaneous cholecystectomy, SMA ischemia s/p stent, cachexia who presented from [facility] for septic shock    Plan:  ====================== NEUROLOGY=====================  AMS  - per nursing home staff pt has been altered for one week  - pt AAOx0, not verbally responding to staff, however improving this AM  - pt following commands  - continue to monitor neuro status  - CTH at OSH negative for acute pathology     ==================== RESPIRATORY======================  Hypercarbia  - Pt hypercarbic at OSH, placed on BiPAP   - Weaned BiPAP after admission to NC with ABG stable  - continue to monitor respiratory status   - wean NC as tolerated with goal SpO2 >94%    Hx trach w/ stoma  - Pt trach x2 cycles in past  - most recently in setting of COVID PNA in 2022  - Pt decannulated after admission  - purulent drainage in stoma site, cultured pending results  - will consult ENT today to eval    ====================CARDIOVASCULAR==================  ACC/AHA stage D systolic heart failure s/p LVAD  - pt appeared hypovolemic on admission  - c/w Vaso gtt for HD instability, wean as tolerated with goal SBP >100  - s/p 1L NS, 250 albumin x1, 2L LR overnight. Fluid responsive- able to wean pressors with fluid overnight. Will give additional fluid prn with goal CVP 6-8. Will give additional 1L LR now.   - Hgb 7, s/p 1U pRBC overnight with incomplete response. continue to monitor    - central sat sent from intro 81.8 with negative lactate x4  - LVAD coordinator and HF attending contacted     Ventricular Tachycardia  - pt has history of VT on previous admissions, takes home mexiletine  - NSVT seen on tele in CICU  - lido gtt @1   - transition to mexiletine when able to take PO    ===================HEMATOLOGIC/ONC ===================  Anemia  - Hgb on admission 7.4 with subsequent drop to 6.6  - s/p 1u PRBC with incomplete response. will monitor cbc q6hr  - of note, pt has hx of GI bleeds  - will assess BM when able   - transfuse as needed if hgb <7      ===================== RENAL =========================  RASHAWN  - SCr 1.3 (baseline 0.8-0.9)  - continue to monitor SCr, BUN, lytes, and I&Os  - replete lytes prn with goal K 4-4.5 and mg >2      ==================== GASTROINTESTINAL===================\  Hx GI bleed in past req multiple transfusions   - no overt bleeding noted on this admission   - monitor BM for signs of blood       albumin human  5% IVPB 250 milliLiter(s) IV Intermittent once  potassium chloride  20 mEq/100 mL IVPB 20 milliEquivalent(s) IV Intermittent every 2 hours    =======================    ENDOCRINE  =====================  Hx Hyperthyroidism   - continue home methimazole   - pending TSH     methimazole 10 milliGRAM(s) Oral daily    ========================INFECTIOUS DISEASE================  Septic shock  - pt arrived tachycardic 120s, hypotensive MAPs 50-60, AMS  - WBC 27  - pt got zosyn and vancomycin x1 at OSH. Will switch zosyn to cefepime and continue with vanc by level.   - f/u infectious workup   - stoma with purulent pus on presentation- culture sent. This AM dry appearing without erythema. ENT consulted   - CT C/A/P at OSH: cholecystostomy in place, patchy opacities vs. atelectasis as per written transfer report (CD and official report not sent with transfer)         Patient requires continuous monitoring with bedside rhythm monitoring, arterial line, pulse oximetry, ventilator monitoring and intermittent blood gas analysis.  Care plan discussed with ICU care team.  Patient remained critical; required more than usual post op care; I have spent >45 minutes providing non-routine post op care, in addition to initial critical time provided by CICU attending Dr. Jensen , re-evaluated multiple times during the day.

## 2022-02-28 NOTE — PROGRESS NOTE ADULT - ATTENDING COMMENTS
Extensive medical history including HFrEF s/p LVAD transferred to CICU with acute respiratory failure and altered mental status  A+Ox0, non-verbal and follows only some commands  LVAD in place, 4.3 LPM of flow  Shock, likely septic, requiring Vasopressin infusion, s/p multiple liters of crystalloid fluid; SvO2 70s  NSVT overnight, ? due to suckdown events, Lidocaine infusion started - will continue per EP  Acute hypercarbic respiratory failure requiring intermittent Bipap  NPO  Normal renal function, volume down on exam despite multiple liters of fluid - target even to 500 cc positive  H/H decreased, no lab evidence of hemolysis - transfuse 1 unit PRBC and trend CBC q6h  Concern for sepsis, pan cultured and started on Vancomycin and Zosyn prior to transfer - transition Zosyn to Cefepime  Unclear source of infection, he does have purulent drainage from his prior stoma - consult ENT  Sugars controlled; continue Methimazole   TLC and nabil 2/28  Poor prognosis - will consult Palliative Extensive medical history including HFrEF s/p LVAD transferred to CICU with acute respiratory failure and altered mental status  A+Ox0, non-verbal and follows only some commands  LVAD in place, 4.3 LPM of flow  Shock, likely septic, requiring Vasopressin infusion, s/p multiple liters of crystalloid fluid; SvO2 70s  NSVT overnight, ? due to suckdown events, Lidocaine infusion started - will continue per EP  Acute hypercarbic respiratory failure requiring intermittent Bipap   NPO  Normal renal function, volume down on exam despite multiple liters of fluid - target even to 500 cc positive  H/H decreased, no lab evidence of hemolysis - transfuse 1 unit PRBC and trend CBC q6h  Concern for sepsis, pan cultured and started on Vancomycin and Zosyn prior to transfer - transition Zosyn to Cefepime  Unclear source of infection, he does have purulent drainage from his prior stoma - consult ENT  Sugars controlled; continue Methimazole   TLC and nabil 2/28  Poor prognosis - will consult Palliative. Unfortunately, escalating care for this patient is likely futile. May also need Ethics consult.

## 2022-02-28 NOTE — CONSULT NOTE ADULT - SUBJECTIVE AND OBJECTIVE BOX
Patient is a 66y old  Male who presents with a chief complaint of Septic shock (28 Feb 2022 17:20)      HPI:  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 s/p HM2 LVAD in 9/2017 at  (due to severe PAD) with TV ring, multiple GI bleeds, thus maintained off AC, CKD 3prior COVID-19 infection in 4/2020, recurrent syncope post LVAD s/p ILR, s/p prolonged complex hospitalization from 6/14-11/16/2021 initially admitted with abdominal pain complicated by GIB, respiratory failure s/p trach, multiple infections, s/p cholecystotomy tube and SMA stent 10/2021, ultimately discharged to [facility] rehab and then returned to [facility] for trach decannulation 12/2, readmitted in 2/2022 with recurrent COVID-19 infection, initially in distributive shock. Blood cultures were also positive for serratia marcescens which he has had in the past.     Pt was transferred to [facility] CICU from [facility]. He was sent to their ED from a rehab center due to AMS and tachycardia. At [facility], they noted a WBC greater than 20, tachycardia and hypercarbia on ABG. Pt was placed on BiPAP and transferred to [facility] CICU for concern for sepsis vs septic shock.     Podiatry consulted for right heel DTI present on admission    PAST MEDICAL & SURGICAL HISTORY:  CHF (congestive heart failure)    CAD (coronary artery disease)    Depression    Pleural effusion    History of 2019 novel coronavirus disease (COVID-19)  april 2020    Hemorrhoids    Bleeding hemorrhoids    Peripheral arterial disease    Claudication    BPH with urinary obstruction    ACC/AHA stage D systolic heart failure    Anticoagulation goal of INR 2.0 to 2.5    Falls    Clavicle fracture    CKD (chronic kidney disease), stage III    Iron deficiency anemia    H/O epistaxis    Vertigo    GI bleed    S/P TVR (tricuspid valve repair)    S/P ventricular assist device    S/P endoscopy    H/O tracheostomy        MEDICATIONS  (STANDING):  cefepime   IVPB 2000 milliGRAM(s) IV Intermittent every 12 hours  chlorhexidine 2% Cloths 1 Application(s) Topical <User Schedule>  cholecalciferol 1000 Unit(s) Oral daily  dronabinol 5 milliGRAM(s) Oral two times a day before meals  gabapentin Solution 100 milliGRAM(s) Oral three times a day  methimazole 10 milliGRAM(s) Oral daily  metoclopramide 10 milliGRAM(s) Oral four times a day  mirtazapine 7.5 milliGRAM(s) Oral at bedtime  multivitamin 1 Tablet(s) Oral daily  pantoprazole    Tablet 40 milliGRAM(s) Oral before breakfast  senna 2 Tablet(s) Oral at bedtime  sertraline 100 milliGRAM(s) Oral daily  vasopressin Infusion 0.06 Unit(s)/Min (3.6 mL/Hr) IV Continuous <Continuous>    MEDICATIONS  (PRN):      Allergies    Amiodarone Hydrochloride (Other (Severe))    Intolerances        VITALS:    Vital Signs Last 24 Hrs  T(C): 37.2 (28 Feb 2022 15:00), Max: 38.3 (28 Feb 2022 05:00)  T(F): 99 (28 Feb 2022 15:00), Max: 100.9 (28 Feb 2022 05:00)  HR: 71 (28 Feb 2022 19:30) (69 - 138)  BP: --  BP(mean): 72 (27 Feb 2022 21:20) (72 - 72)  RR: 12 (28 Feb 2022 19:30) (8 - 36)  SpO2: 100% (28 Feb 2022 19:30) (82% - 100%)    LABS:                          8.8    22.53 )-----------( 96       ( 28 Feb 2022 15:47 )             26.9       02-28    139  |  110<H>  |  19  ----------------------------<  164<H>  4.3   |  18<L>  |  0.98    Ca    8.7      28 Feb 2022 15:47  Phos  2.9     02-28  Mg     2.1     02-28    TPro  7.2  /  Alb  2.3<L>  /  TBili  0.5  /  DBili  x   /  AST  82<H>  /  ALT  47<H>  /  AlkPhos  131<H>  02-28      CAPILLARY BLOOD GLUCOSE          PT/INR - ( 27 Feb 2022 23:13 )   PT: 16.1 sec;   INR: 1.34 ratio             LOWER EXTREMITY PHYSICAL EXAM:    Vasular: DP/PT 0/4, B/L, CFT <3 seconds B/L, Temperature gradient wnl, B/L.   Neuro: Epicritic sensation intact to the level of foot, B/L.  Musculoskeletal/Ortho: painful contracted hammertoes 2-5 bilat  Skin: right heel DTI present on admission

## 2022-02-28 NOTE — CONSULT NOTE ADULT - ASSESSMENT
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 s/p HM2 LVAD in 9/2017 at  (due to severe PAD) with TV ring, multiple GI bleeds, thus maintained off AC, CKD 3prior COVID-19 infection in 4/2020, recurrent syncope post LVAD s/p ILR, s/p prolonged complex hospitalization from 6/14-11/16/2021 initially admitted with abdominal pain complicated by GIB, respiratory failure s/p trach, multiple infections, s/p cholecystotomy tube and SMA stent 10/2021, ultimately discharged to [facility] rehab and then returned to [facility] for trach decannulation 12/2. The patient had a recent admission with COVID 19 reinfection and distributive shock. That admission he had blood culture positive for serratia marcescens (discharged on levaquin).  He was treated with MAB, remdesivir, and steroids. He had recurrent VT and was started mexiletine. Now coming in from [facility] ED with leukocytosis and AMS was treated for UTI. Initially required bipap but was weaned off here. Labs concerning here for WC 26, hemoglobin of 7s then 6.7, BNP 5K, creatinine of 1.3. His maps were initially in the 50s. Physical exam showing pus from stoma, sat of central access of 81.8, and tachycardia are all concerning for septic shock picture. He is status 1L fluids, 1uPRBCs, and placed on vaso and lidocaine     Upon admission HM2 4.8 LPM, 9200 RPM

## 2022-02-28 NOTE — CONSULT NOTE ADULT - PROBLEM SELECTOR RECOMMENDATION 9
-Renally dose zosyn  -Will obtain Vanc level in AM given roel (received 1g [facility] ED)  -Infectious w/u pending, of note patient has history of serratia bacteremia   -Would obtain abdominal imaging to r/o other causes of shock (GI bleed) when more stable  -ENT/ID evaluation for pus in stoma site -Renally dose zosyn  -Will obtain Vanc level in AM given roel (received 1g [facility] ED)  -Infectious w/u pending, of note patient has history of serratia bacteremia   -Would obtain abdominal imaging to r/o other causes of shock (GI bleed) when more stable  -ENT/ID evaluation for pus in stoma site  -Wean vasopressin as tolerated

## 2022-02-28 NOTE — CONSULT NOTE ADULT - SUBJECTIVE AND OBJECTIVE BOX
Shilpa Webber MD  Cardiology Fellow  [phone]  All Cardiology service information can be found 24/7 on [email or web address], password: [email or web address]    Patient seen and evaluated at bedside    Chief Complaint:    HPI:  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 s/p HM2 LVAD in 9/2017 at  (due to severe PAD) with TV ring, multiple GI bleeds, thus maintained off AC, CKD 3prior COVID-19 infection in 4/2020, recurrent syncope post LVAD s/p ILR, s/p prolonged complex hospitalization from 6/14-11/16/2021 initially admitted with abdominal pain complicated by GIB, respiratory failure s/p trach, multiple infections, s/p cholecystotomy tube and SMA stent 10/2021, ultimately discharged to [facility] rehab and then returned to [facility] for trach decannulation 12/2, readmitted in 2/2022 with recurrent COVID-19 infection, initially in distributive shock. Blood cultures were also positive for serratia marcescens which he has had in the past.     Pt was transferred to [facility] CICU from [facility]. He was sent to their ED from a rehab center due to AMS and tachycardia. At [facility], they noted a WBC greater than 20, tachycardia and hypercarbia on ABG. Pt was placed on BiPAP and transferred to [facility] CICU for concern for sepsis vs septic shock.      (27 Feb 2022 22:20)      PMHx:   No pertinent past medical history    CHF (congestive heart failure)    CAD (coronary artery disease)    Depression    Pleural effusion    History of 2019 novel coronavirus disease (COVID-19)    Hemorrhoids    Bleeding hemorrhoids    Peripheral arterial disease    Claudication    BPH with urinary obstruction    ACC/AHA stage D systolic heart failure    Anticoagulation goal of INR 2.0 to 2.5    Falls    Clavicle fracture    CKD (chronic kidney disease), stage III    Iron deficiency anemia    H/O epistaxis    Vertigo    GI bleed        PSHx:   No significant past surgical history    S/P TVR (tricuspid valve repair)    S/P ventricular assist device    S/P endoscopy    H/O tracheostomy        Allergies:  Amiodarone Hydrochloride (Other (Severe))      Home Meds:    Current Medications:   chlorhexidine 2% Cloths 1 Application(s) Topical <User Schedule>  methimazole 10 milliGRAM(s) Oral daily  mexiletine 150 milliGRAM(s) Oral every 8 hours  piperacillin/tazobactam IVPB.. 3.375 Gram(s) IV Intermittent every 8 hours  potassium chloride  20 mEq/100 mL IVPB 20 milliEquivalent(s) IV Intermittent every 2 hours      FAMILY HISTORY:    REVIEW OF SYSTEMS:  Unable to assess     Physical Exam:  T(F): 100.8 (02-27), Max: 100.8 (02-27)  HR: 112 (02-28) (105 - 135)  BP: --  RR: 21 (02-28)  SpO2: 90% (02-28)  GENERAL: No acute distress,  ENT: NO JVD   CHEST/LUNG: Bi basilar crackles   HEART: Regular rate and rhythm  EXTREMITIES:  No edema   PSYCH: Nl behavior, nl affect  SKIN: Normal color, No rashes or lesions  LINES:    Cardiovascular Diagnostic Testing:    ECG: ST with non specific conduction delay     Echo:  1/22  Conclusions:  Heartmate 2 at  9200/min.  Normal left ventricular internal dimensions and wall  thicknesses.  Left ventricular assist device (LVAD) noted in the apex  with laminar flow.  Calcified aortic valve which remains closed on all observed  beats. Mild, continuous aortic regurgitation.  Right ventricular enlargement with decreased right  ventricular systolic function.  An annuloplasty ring is seen in the tricuspid position.  Mild tricuspid regurgitation.        Imaging:    CXR: Personally reviewed    Labs: Personally reviewed                        7.4    26.98 )-----------( 142      ( 27 Feb 2022 23:12 )             23.8     02-27    142  |  109<H>  |  24<H>  ----------------------------<  135<H>  3.6   |  20<L>  |  1.31<H>    Ca    9.2      27 Feb 2022 23:13  Phos  3.0     02-27  Mg     2.1     02-27    TPro  8.0  /  Alb  2.6<L>  /  TBili  0.6  /  DBili  x   /  AST  205<H>  /  ALT  71<H>  /  AlkPhos  166<H>  02-27    PT/INR - ( 27 Feb 2022 23:13 )   PT: 16.1 sec;   INR: 1.34 ratio                   Serum Pro-Brain Natriuretic Peptide: 5486 pg/mL (02-27 @ 23:13)           Shilpa Webber MD  Cardiology Fellow  [phone]  All Cardiology service information can be found 24/7 on [email or web address], password: [email or web address]    Patient seen and evaluated at bedside    Chief Complaint:    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 s/p HM2 LVAD in 9/2017 at  (due to severe PAD) with TV ring, multiple GI bleeds, thus maintained off AC, CKD 3prior COVID-19 infection in 4/2020, recurrent syncope post LVAD s/p ILR, s/p prolonged complex hospitalization from 6/14-11/16/2021 initially admitted with abdominal pain complicated by GIB, respiratory failure s/p trach, multiple infections, s/p cholecystotomy tube and SMA stent 10/2021, ultimately discharged to [facility] rehab and then returned to [facility] for trach decannulation 12/2, readmitted in 2/2022 with recurrent COVID-19 infection, initially in distributive shock. Blood cultures were also positive for serratia marcescens which he has had in the past.     Pt was transferred to [facility] CICU from [facility]. He was sent to their ED from a rehab center due to AMS and tachycardia. At [facility], they noted a WBC greater than 20, tachycardia and hypercarbia on ABG. Pt was placed on BiPAP and transferred to [facility] CICU for concern for sepsis vs septic shock.      (27 Feb 2022 22:20)      PMHx:   No pertinent past medical history    CHF (congestive heart failure)    CAD (coronary artery disease)    Depression    Pleural effusion    History of 2019 novel coronavirus disease (COVID-19)    Hemorrhoids    Bleeding hemorrhoids    Peripheral arterial disease    Claudication    BPH with urinary obstruction    ACC/AHA stage D systolic heart failure    Anticoagulation goal of INR 2.0 to 2.5    Falls    Clavicle fracture    CKD (chronic kidney disease), stage III    Iron deficiency anemia    H/O epistaxis    Vertigo    GI bleed        PSHx:   No significant past surgical history    S/P TVR (tricuspid valve repair)    S/P ventricular assist device    S/P endoscopy    H/O tracheostomy        Allergies:  Amiodarone Hydrochloride (Other (Severe))      Home Meds:    Current Medications:   chlorhexidine 2% Cloths 1 Application(s) Topical <User Schedule>  methimazole 10 milliGRAM(s) Oral daily  mexiletine 150 milliGRAM(s) Oral every 8 hours  piperacillin/tazobactam IVPB.. 3.375 Gram(s) IV Intermittent every 8 hours  potassium chloride  20 mEq/100 mL IVPB 20 milliEquivalent(s) IV Intermittent every 2 hours      FAMILY HISTORY:    REVIEW OF SYSTEMS:  Unable to assess     Physical Exam:  T(F): 100.8 (02-27), Max: 100.8 (02-27)  HR: 112 (02-28) (105 - 135)  BP: --  RR: 21 (02-28)  SpO2: 90% (02-28)  GENERAL: No acute distress,  ENT: NO JVD   CHEST/LUNG: clear lungs   HEART: +VAD hum  EXTREMITIES:  No edema   PSYCH: Nl behavior, nl affect  SKIN: Normal color, No rashes or lesions  LINES:    Cardiovascular Diagnostic Testing:    ECG: ST with non specific conduction delay     Echo:  1/22  Conclusions:  Heartmate 2 at  9200/min.  Normal left ventricular internal dimensions and wall  thicknesses.  Left ventricular assist device (LVAD) noted in the apex  with laminar flow.  Calcified aortic valve which remains closed on all observed  beats. Mild, continuous aortic regurgitation.  Right ventricular enlargement with decreased right  ventricular systolic function.  An annuloplasty ring is seen in the tricuspid position.  Mild tricuspid regurgitation.        Imaging:    CXR: Personally reviewed    Labs: Personally reviewed                        7.4    26.98 )-----------( 142      ( 27 Feb 2022 23:12 )             23.8     02-27    142  |  109<H>  |  24<H>  ----------------------------<  135<H>  3.6   |  20<L>  |  1.31<H>    Ca    9.2      27 Feb 2022 23:13  Phos  3.0     02-27  Mg     2.1     02-27    TPro  8.0  /  Alb  2.6<L>  /  TBili  0.6  /  DBili  x   /  AST  205<H>  /  ALT  71<H>  /  AlkPhos  166<H>  02-27    PT/INR - ( 27 Feb 2022 23:13 )   PT: 16.1 sec;   INR: 1.34 ratio                   Serum Pro-Brain Natriuretic Peptide: 5486 pg/mL (02-27 @ 23:13)

## 2022-02-28 NOTE — PROCEDURE NOTE - NSICDXPROCEDURE_GEN_ALL_CORE_FT
PROCEDURES:  Insertion, catheter, central venous, non-tunneled, age 5 years or older 28-Feb-2022 00:53:08  Tamie Vázquez  
PROCEDURES:  Insertion, arterial line, percutaneous 27-Feb-2022 22:20:05  Tamie Vázquez

## 2022-02-28 NOTE — CONSULT NOTE ADULT - PROBLEM SELECTOR RECOMMENDATION 6
History of GI bleed, will obtain abdominal imaging when stable, hemolysis labs, and transfuse at this time.

## 2022-02-28 NOTE — CONSULT NOTE ADULT - SUBJECTIVE AND OBJECTIVE BOX
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 s/p HM2 LVAD in 9/2017 at  (due to severe PAD) with TV ring, multiple GI bleeds, thus maintained off AC, CKD 3prior COVID-19 infection in 4/2020, recurrent syncope post LVAD s/p ILR, s/p prolonged complex hospitalization from 6/14-11/16/2021 initially admitted with abdominal pain complicated by GIB, respiratory failure s/p trach, multiple infections, s/p cholecystotomy tube and SMA stent 10/2021, ultimately discharged to [facility] rehab and then returned to [facility] for trach decannulation 12/2, readmitted in 2/2022 with recurrent COVID-19 infection, initially in distributive shock. Blood cultures were also positive for serratia marcescens which he has had in the past.     Pt was transferred to [facility] CICU from [facility]. He was sent to their ED from a rehab center due to AMS and tachycardia. At [facility], they noted a WBC greater than 20, tachycardia and hypercarbia on ABG. Pt was placed on BiPAP and transferred to [facility] CICU for concern for sepsis vs septic shock.      (27 Feb 2022 22:20)      PMHx:   No pertinent past medical history    CHF (congestive heart failure)    CAD (coronary artery disease)    Depression    Pleural effusion    History of 2019 novel coronavirus disease (COVID-19)    Hemorrhoids    Bleeding hemorrhoids    Peripheral arterial disease    Claudication    BPH with urinary obstruction    ACC/AHA stage D systolic heart failure    Anticoagulation goal of INR 2.0 to 2.5    Falls    Clavicle fracture    CKD (chronic kidney disease), stage III    Iron deficiency anemia    H/O epistaxis    Vertigo    GI bleed        PSHx:   No significant past surgical history    S/P TVR (tricuspid valve repair)    S/P ventricular assist device    S/P endoscopy    H/O tracheostomy    Allergies:  Amiodarone Hydrochloride (Other (Severe))  Home Meds:    Current Medications:   chlorhexidine 2% Cloths 1 Application(s) Topical <User Schedule>  cholecalciferol 1000 Unit(s) Oral daily  dronabinol 5 milliGRAM(s) Oral two times a day before meals  gabapentin Solution 100 milliGRAM(s) Oral three times a day  methimazole 10 milliGRAM(s) Oral daily  metoclopramide 10 milliGRAM(s) Oral four times a day  mirtazapine 7.5 milliGRAM(s) Oral at bedtime  multivitamin 1 Tablet(s) Oral daily  pantoprazole    Tablet 40 milliGRAM(s) Oral before breakfast  piperacillin/tazobactam IVPB.. 3.375 Gram(s) IV Intermittent every 8 hours  potassium chloride  20 mEq/100 mL IVPB 20 milliEquivalent(s) IV Intermittent every 2 hours  senna 2 Tablet(s) Oral at bedtime  sertraline 100 milliGRAM(s) Oral daily      FAMILY HISTORY:          REVIEW OF SYSTEMS:  unable to assess     Physical Exam:  T(F): 99.9 (02-28), Max: 100.8 (02-27)  HR: 104 (02-28) (104 - 135)  BP: --  RR: 21 (02-28)  SpO2: 91% (02-28)  GENERAL: No acute distress,  ENT: NO JVD   CHEST/LUNG: clear lungs   HEART: +VAD hum  EXTREMITIES:  No edema   PSYCH: Nl behavior, nl affect  SKIN: Normal color, No rashes or lesions  SKIN: Normal color, No rashes or lesions  LINES:    Cardiovascular Diagnostic Testing:    ECG:ST with non specific conduction delay     Echo:  1/22  Conclusions:  Heartmate 2 at  9200/min.  Normal left ventricular internal dimensions and wall  thicknesses.  Left ventricular assist device (LVAD) noted in the apex  with laminar flow.  Calcified aortic valve which remains closed on all observed  beats. Mild, continuous aortic regurgitation.  Right ventricular enlargement with decreased right  ventricular systolic function.  An annuloplasty ring is seen in the tricuspid position.  Mild tricuspid regurgitation.      Labs: Personally reviewed                        7.4    26.98 )-----------( 142      ( 27 Feb 2022 23:12 )             23.8     02-27    142  |  109<H>  |  24<H>  ----------------------------<  135<H>  3.6   |  20<L>  |  1.31<H>    Ca    9.2      27 Feb 2022 23:13  Phos  3.0     02-27  Mg     2.1     02-27    TPro  8.0  /  Alb  2.6<L>  /  TBili  0.6  /  DBili  x   /  AST  205<H>  /  ALT  71<H>  /  AlkPhos  166<H>  02-27    PT/INR - ( 27 Feb 2022 23:13 )   PT: 16.1 sec;   INR: 1.34 ratio             Serum Pro-Brain Natriuretic Peptide: 5486 pg/mL (02-27 @ 23:13)

## 2022-03-01 NOTE — PROGRESS NOTE ADULT - ATTENDING COMMENTS
Patient seen and examined   Case discussed with Dr Hall  Patient slightly improved decreased oxygen requirements  More awake and alert  Recurrent serratia bacteremia  Strongly suspected underlying LVAD infeciton  Agree with Cefepime pending sensitivity pattern  Can discontinue the Vancomycin  Once he stabilizes will need to re-image Abd/pelvis by CT    Morris Prater MD  [phone]  After 5pm/weekends [phone]

## 2022-03-01 NOTE — PROGRESS NOTE ADULT - ATTENDING COMMENTS
VT in the setting of infection. Tolerated without incident on VAD. Would hold lidocaine drip as it will cloud clinical picture WRT mental status.

## 2022-03-01 NOTE — CONSULT NOTE ADULT - ASSESSMENT
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 s/p HM2 LVAD in 9/2017 at  (due to severe PAD) with TV ring, multiple GI bleeds, thus maintained off AC, CKD 3prior COVID-19 infection in 4/2020, recurrent syncope post LVAD s/p ILR, s/p prolonged complex hospitalization from 6/14-11/16/2021 initially admitted with abdominal pain complicated by GIB, respiratory failure s/p trach, multiple infections, s/p cholecystotomy tube and SMA stent 10/2021, ultimately discharged to [facility] rehab and then returned to [facility] for trach decannulation 12/2, readmitted in 2/2022 with recurrent COVID-19 infection, initially in distributive shock. Blood cultures were also positive for serratia marcescens which he has had in the past.   Pt was transferred to [facility] CICU from [facility]. He was sent to their ED from a rehab center due to AMS and tachycardia. At [facility], they noted a WBC greater than 20, tachycardia and hypercarbia on ABG. Pt was placed on BiPAP and transferred to [facility] CICU for  sepsis vs septic shock. Geriatrics and Palliative Medicine (GaP) Team was called for goals of care

## 2022-03-01 NOTE — PROGRESS NOTE ADULT - PROBLEM SELECTOR PLAN 4
- lidocaine gtt discontinued  - no further NSVT  - EP following  - resumed on home dose of mexiletine via NGT

## 2022-03-01 NOTE — PROGRESS NOTE ADULT - ATTENDING COMMENTS
Extensive medical history including HFrEF s/p LVAD transferred to CICU with acute respiratory failure and altered mental status  A+Ox0, non-verbal and follows only minimal commands, CT head benign  LVAD in place, approximately 5 LPM of flow  Septic shock requiring Vasopressin infusion, s/p multiple liters of crystalloid fluid; SvO2 70s  NSVT has resolved, off Lidocaine drip   Acute hypercarbic respiratory failure requiring intermittent Bipap - wean to nasal cannula  NPO - start tube feeds  Normal renal function, low filling pressures - target even to 500 cc positive  H/H low but acceptable, no lab evidence of hemolysis   Sepsis, blood cultures +Serratia - stop Vancomycin, continue Cefepime  Unclear source of infection, 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 Unfortunately, escalating care for this patient is likely futile. May also need Ethics consult as he has often wavered on his goals of care and who he would like as his healthcare proxy.

## 2022-03-01 NOTE — PROGRESS NOTE ADULT - SUBJECTIVE AND OBJECTIVE BOX
Subjective:    Medications:  cefepime   IVPB 2000 milliGRAM(s) IV Intermittent every 12 hours  chlorhexidine 2% Cloths 1 Application(s) Topical <User Schedule>  cholecalciferol 1000 Unit(s) Oral daily  gabapentin Solution 100 milliGRAM(s) Oral three times a day  methimazole 10 milliGRAM(s) Oral daily  metoclopramide 10 milliGRAM(s) Oral four times a day  mirtazapine 7.5 milliGRAM(s) Oral at bedtime  multivitamin 1 Tablet(s) Oral daily  pantoprazole  Injectable 40 milliGRAM(s) IV Push daily  senna 2 Tablet(s) Oral at bedtime  sertraline 100 milliGRAM(s) Oral daily  vancomycin  IVPB 750 milliGRAM(s) IV Intermittent every 24 hours  vasopressin Infusion 0.06 Unit(s)/Min IV Continuous <Continuous>      Physical Exam:    Vitals:  Vital Signs Last 24 Hours  T(C): 38 (22 @ 11:00), Max: 38.4 (22 @ 09:00)  HR: 110 (22 @ 11:00) (67 - 119)  BP: --  RR: 40 (22 @ 11:00) (5 - 40)  SpO2: 99% (22 @ 11:00) (82% - 100%)    Weight in k.4 ( @ 04:00)    I&O's Summary    2022 07:01  -  01 Mar 2022 07:00  --------------------------------------------------------  IN: 1060.7 mL / OUT: 1600 mL / NET: -539.3 mL    01 Mar 2022 07:  -  01 Mar 2022 11:15  --------------------------------------------------------  IN: 334.6 mL / OUT: 180 mL / NET: 154.6 mL        Tele:    General: No distress. Comfortable.  HEENT: EOM intact.  Neck: Neck supple. JVP not elevated. No masses  Chest: Clear to auscultation bilaterally  CV: Normal S1 and S2. No murmurs, rub, or gallops. Radial pulses normal.  Abdomen: Soft, non-distended, non-tender  Skin: No rashes or skin breakdown  Neurology: Alert and oriented times three. Sensation intact  Psych: Affect normal    Labs:                        8.4    23.62 )-----------( 83       ( 01 Mar 2022 06:16 )             26.9     03    140  |  108  |  20  ----------------------------<  166<H>  4.0   |  20<L>  |  1.01    Ca    9.1      01 Mar 2022 00:08  Phos  2.5       Mg     1.9         TPro  7.3  /  Alb  2.7<L>  /  TBili  0.5  /  DBili  x   /  AST  58<H>  /  ALT  40  /  AlkPhos  133<H>      PT/INR - ( 01 Mar 2022 00:08 )   PT: 16.0 sec;   INR: 1.33 ratio         PTT - ( 01 Mar 2022 00:08 )  PTT:25.0 sec      Serum Pro-Brain Natriuretic Peptide: 5486 pg/mL ( @ 23:13)      Lactate Dehydrogenase, Serum: 199 U/L ( @ 00:08)  Lactate Dehydrogenase, Serum: 381 U/L ( @ 23:13)       Subjective:  - BCx from  with one aerobic bottle positive for serratia, one with GNR not yet speciated  - on lower dose of vaso this morning  - DHT place, not yet receiving feeds  - more alert this morning, not verbalizing  - 4 loose stools yesterday per RN    Medications:  cefepime   IVPB 2000 milliGRAM(s) IV Intermittent every 12 hours  chlorhexidine 2% Cloths 1 Application(s) Topical <User Schedule>  cholecalciferol 1000 Unit(s) Oral daily  gabapentin Solution 100 milliGRAM(s) Oral three times a day  methimazole 10 milliGRAM(s) Oral daily  metoclopramide 10 milliGRAM(s) Oral four times a day  mirtazapine 7.5 milliGRAM(s) Oral at bedtime  multivitamin 1 Tablet(s) Oral daily  pantoprazole  Injectable 40 milliGRAM(s) IV Push daily  senna 2 Tablet(s) Oral at bedtime  sertraline 100 milliGRAM(s) Oral daily  vancomycin  IVPB 750 milliGRAM(s) IV Intermittent every 24 hours  vasopressin Infusion 0.06 Unit(s)/Min IV Continuous <Continuous>      Physical Exam:    Vitals:  Vital Signs Last 24 Hours  T(C): 38 (22 @ 11:00), Max: 38.4 (22 @ 09:00)  HR: 110 (22 @ 11:00) (67 - 119)  BP: MAPs 84-87, BP 90s/70s  RR: 40 (22 @ 11:00) (5 - 40)  SpO2: 99% (22 @ 11:00) (82% - 100%)    LVAD heart mate 2  speed 9200  flow 5.3  PI 6.0  power 5.6  no events    Weight in k.4 ( @ 04:00)    I&O's Summary    2022 07:  -  01 Mar 2022 07:00  --------------------------------------------------------  IN: 1060.7 mL / OUT: 1600 mL / NET: -539.3 mL    01 Mar 2022 07:01  -  01 Mar 2022 11:15  --------------------------------------------------------  IN: 334.6 mL / OUT: 180 mL / NET: 154.6 mL    Tele: SR PVCs     General: Frail. No acute distress.  HEENT: EOM intact. Open prior tracheostomy site without draiange.  Neck: Neck supple. JVP not elevated. No masses  Chest: Clear to auscultation bilaterally  CV: LVAD hum. No palpable pulses. No LE edema.   Abdomen: Soft, non-distended, non-tender. LVAD driveline site with dressing c/d/i. perc choley drain in place with bilious output.  Skin: warm peripherally.  Neurology: Alert and not verbalizing. Sensation intact  Psych: flat    Labs:                        8.4    23.62 )-----------( 83       ( 01 Mar 2022 06:16 )             26.9         140  |  108  |  20  ----------------------------<  166<H>  4.0   |  20<L>  |  1.01    Ca    9.1      01 Mar 2022 00:08  Phos  2.5       Mg     1.9         TPro  7.3  /  Alb  2.7<L>  /  TBili  0.5  /  DBili  x   /  AST  58<H>  /  ALT  40  /  AlkPhos  133<H>      PT/INR - ( 01 Mar 2022 00:08 )   PT: 16.0 sec;   INR: 1.33 ratio         PTT - ( 01 Mar 2022 00:08 )  PTT:25.0 sec      Serum Pro-Brain Natriuretic Peptide: 5486 pg/mL ( @ 23:13)      Lactate Dehydrogenase, Serum: 199 U/L ( @ 00:08)  Lactate Dehydrogenase, Serum: 381 U/L ( @ 23:13)

## 2022-03-01 NOTE — PROGRESS NOTE ADULT - ASSESSMENT
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 s/p HM2 LVAD in 9/2017 at  (due to severe PAD) with TV ring, multiple GI bleeds, thus maintained off AC, CKD 3prior COVID-19 infection in 4/2020, recurrent syncope post LVAD s/p ILR, s/p prolonged complex hospitalization from 6/14-11/16/2021 initially admitted with abdominal pain complicated by GIB, respiratory failure s/p trach, multiple infections, s/p cholecystotomy tube and SMA stent 10/2021, ultimately discharged to [facility] rehab and then returned to [facility] for trach decannulation 12/2. The patient had a recent admission with COVID 19 reinfection and distributive shock. That admission he had blood culture positive for serratia marcescens (discharged on levaquin).  He was treated with MAB, remdesivir, and steroids. He had recurrent VT and was started mexiletine. Now coming in from [facility] ED with leukocytosis and AMS was treated for UTI. Initially required bipap but was weaned off here. Labs concerning here for WC 26, hemoglobin of 7s then 6.7, BNP 5K, creatinine of 1.3. His maps were initially in the 50s. Physical exam showing pus from stoma, sat of central access of 81.8, fevers, and tachycardia are all concerning for septic shock picture. He is status 1L fluids, 1uPRBCs, and placed on vaso. Patient has known episodes of VT and had recurrent NSVT/VT when he first arrived started on lidocaine 1 with improvement. Of note he was previously on mexiletine 150 TID outpatient, metoprolol ER 25 qAM and 50 qPM. Previous history of thyrotoxicosis on AMIO. Blood cultures continue to grow serratia marcescens.     #VT   -Can hold Lidocaine drip, transition to mexiletine when taking PO   -Restart BB when not in shock   -Keep K>4 and Mg>2     #SVT   Likely on tele ALEXANDRE Frias 2, however, given hx of GIB, cannot be on AC   -Continue tele monitoring at this time

## 2022-03-01 NOTE — PROGRESS NOTE ADULT - ASSESSMENT
66 years old male with PMHx of Stage D HF 2/2 NICM s/p HM2 LVAD in 9/2017 at  (due to severe PAD) with TV ring, multiple GI bleeds, no AC, CKD 3 prior COVID-19 infection in 4/2020, chronic cholecystitis s/p percutaneous cholecystostomy tube, recurrent COVID infection, Serratia bacteremia transferred from [facility] for sepsis.      ID is consulted for septic shock  Recently had Serratia bacteremia discharged on suppressive PO Levaquin  Returned with leukocytosis, fever, AMS, hypotension requiring pressors  ?purulent sputum coming out from previous trach site  CXR no PNA  CTH unrevealing  LVAD exit sites shows no signs of infection  Perc dawn tube site shows no signs of infection    Antibiotics:  Vancomycin 2/28 -   Zosyn 2/28  Cefepime 2/28 -     Currently unclear etiology of septic shock, recurrent bacteremia is high on differentials (was on Levaquin for Serratia bacteremia but it is resistant, mostly due to lack of other PO options and try to prevent PICC line infection)  DDx pneumonia, tracheitis, intra-abdominal infection?  Would continue current abx  Will need CT chest, A/P to rule out other focus  Detailed discussion with CICU attending Dr. Jensen, patient likely would have worsening respiratory status but due to his multiple comorbidities and previous intubation, it is futile to escalate care to intubation as patient will likely unable to be weaned off ventilator.      IMPRESSION:  Septic shock  Leukocytosis  Fever  Hx Serratia bacteremia  LVAD      RECOMMENDATIONS:  - Continue IV vancomycin 750mg q24hrs and IV cefepime 2gm q12hrs  - Monitor vancomycin trough 30 minutes prior 4th dose, keep trough around 10-15; monitor Cr daily  - Follow up blood cultures and secretion culture from [facility] site  - Obtain blood culture record from [facility] as well  - CT chest and abdomen/pelvis if patient is stable to do so  - Trend WBC, fever curve, transaminases, creatinine daily  - Will continue to follow  - GOC discussion  - Guarded prognosis      * THIS IS AN INCOMPLETE NOTE. FINAL RECOMMENDATION WILL BE UPDATED AFTER DISCUSSING WITH ATTENDING *   66 years old male with PMHx of Stage D HF 2/2 NICM s/p HM2 LVAD in 9/2017 at  (due to severe PAD) with TV ring, multiple GI bleeds, no AC, CKD 3 prior COVID-19 infection in 4/2020, chronic cholecystitis s/p percutaneous cholecystostomy tube, recurrent COVID infection, Serratia bacteremia transferred from [facility] for sepsis.      ID is consulted for septic shock  Recently had Serratia bacteremia discharged on suppressive PO Levaquin  Returned with leukocytosis, fever, AMS, hypotension requiring pressors  ?purulent sputum coming out from previous trach site  CXR no PNA  CTH unrevealing  LVAD exit sites shows no signs of infection  Perc dawn tube site shows no signs of infection    Antibiotics:  Vancomycin 2/28 -   Zosyn 2/28  Cefepime 2/28 -     Currently unclear etiology of septic shock likely secondary to recurrent serratia bacteremia  Source of serratia is likely LVAD? or other intra-abdominal focus  Can D/C vancomycin and decrease cefepime to non-pseudomonas dosing  Will need CT chest, A/P to rule out any abscess or distant focus  Detailed discussion with CICU attending Dr. Jensen, patient likely would have worsening respiratory status but due to his multiple comorbidities and previous intubation, it is futile to escalate care to intubation as patient will likely unable to be weaned off ventilator.      IMPRESSION:  Septic shock 2/2 serratia bacteremia  Leukocytosis  Fever  LVAD      RECOMMENDATIONS:  - D/C vancomycin  - Decrease cefepime to 1gm q12hrs  - Follow up blood cultures and secretion culture from trach site  - Obtain blood culture record from [facility] as well  - CT chest and abdomen/pelvis if patient is stable to do so  - Trend WBC, fever curve, transaminases, creatinine daily  - Will continue to follow  - GOC discussion  - Guarded prognosis      * THIS IS AN INCOMPLETE NOTE. FINAL RECOMMENDATION WILL BE UPDATED AFTER DISCUSSING WITH ATTENDING *   66 years old male with PMHx of Stage D HF 2/2 NICM s/p HM2 LVAD in 9/2017 at  (due to severe PAD) with TV ring, multiple GI bleeds, no AC, CKD 3 prior COVID-19 infection in 4/2020, chronic cholecystitis s/p percutaneous cholecystostomy tube, recurrent COVID infection, Serratia bacteremia transferred from [facility] for sepsis.      ID is consulted for septic shock  Recently had Serratia bacteremia discharged on suppressive PO Levaquin  Returned with leukocytosis, fever, AMS, hypotension requiring pressors  ?purulent sputum coming out from previous trach site  CXR no PNA  CTH unrevealing  LVAD exit sites shows no signs of infection  Perc dawn tube site shows no signs of infection    Antibiotics:  Vancomycin 2/28 -   Zosyn 2/28  Cefepime 2/28 -     Currently unclear etiology of septic shock likely secondary to recurrent serratia bacteremia  Source of serratia is likely LVAD? or other intra-abdominal focus  Can D/C vancomycin and decrease cefepime to non-pseudomonas dosing  Will need CT chest, A/P to rule out any abscess or distant focus  Detailed discussion with CICU attending Dr. Jensen, patient likely would have worsening respiratory status but due to his multiple comorbidities and previous intubation, it is futile to escalate care to intubation as patient will likely unable to be weaned off ventilator.      IMPRESSION:  Septic shock 2/2 serratia bacteremia  Leukocytosis  Fever  LVAD      RECOMMENDATIONS:  - D/C vancomycin  - Decrease cefepime to 1gm q12hrs  - Follow up blood cultures and secretion culture from trach site  - Obtain blood culture record from [facility] as well  - CT chest and abdomen/pelvis with contrast  - Trend WBC, fever curve, transaminases, creatinine daily  - Will continue to follow  - GOC discussion  - Guarded prognosis      Patient is seen and examined with attending and case is discussed with primary team      Marisa Hall,   PGY4 ID fellow  Please contact me via page or text through Microsoft Teams  If after 5PM or on weekends, please call [phone]

## 2022-03-01 NOTE — CHART NOTE - NSCHARTNOTEFT_GEN_A_CORE
Wound Care Team Note:    Request for wound care consult for foot wound received and referred to wound care team Podiatrists, Luis Benitez/Kaleigh/Kelly. Dr. Benitez has seen her already. Will defer to Podiatry for management.    Trisha Hodge, [facility]C, [facility] [id number]

## 2022-03-01 NOTE — PROGRESS NOTE ADULT - ASSESSMENT
Assessment and Recommendation:   · Assessment	  Assessment and recommendation :  Acute hypercapnic respiratory Failure weaned of  positive pressure non invasive ventilator   Septic shock on vasopressin  Bacteremia with serratia marcescens  Severe anemia with low H/H   S/P transfuse one unit of Packed RBCs   Severe ischemic cardiomyopathy   ACC/AHA stage D systolic heart failure  Peripheral vascular Disease   PAF on no ACO   H/O of repeated GI bleeding   S/P mesenteric ischemia   GERD on reglan   S/P HM2 LVAD , VT on Mexiletine   hyperthyroidism on  methimazole   severe protein caloric malnutrition   severe neuropathy   Major depression   S/P tracheostomy X2  pan culture blood and urine done   Continue Antibiotic cefepime   GI prophylaxis   case discussed with CICU   estimated  time is 35 minutes

## 2022-03-01 NOTE — CONSULT NOTE ADULT - PROBLEM SELECTOR RECOMMENDATION 3
Will continue to f/u for GOC.         Francisco Burgos MD   Geriatrics and Palliative Care ([facility]) Consult Service    of Geriatric and Palliative Medicine  [facility]      Please page the following number for clinical matters between the hours of 9 am and 5 pm from Monday through Friday : [phone]    After 5pm and on weekends, please see the contact information below:    In the event of newly developing, evolving, or worsening symptoms, please contact the Palliative Medicine team via pager (if the patient is at [facility] #[phone] or if the patient is at [facility] #[phone]) The Geriatric and Palliative Medicine service has coverage 24 hours a day/ 7 days a week to provide medical recommendations regarding symptom management needs via telephone
-Will hold BB and spironolactone given patient is hypovolemic and requiring pressors   -Can obtain TTE

## 2022-03-01 NOTE — PROGRESS NOTE ADULT - PROBLEM SELECTOR PLAN 1
2/28 BCx + serratia/GNR  - repeat BCx  - IV abx per ID. currently on cefepime and vanc. received zosyn  - chest/A/P CT recommended per ID  - Wean vasopressin as tolerated for MAP of 70  - consider holding home mirtazapine given lethargy  - palliative care consult to readdress C

## 2022-03-01 NOTE — CONSULT NOTE ADULT - PROBLEM SELECTOR RECOMMENDATION 2
Possibly secondary to septic shock   Renally dose medications  Monitor I/Os, lytes, avoid nephrotoxic medications  If no improvement with imaging
The patient's sister is his HCP. The HCP from is available under Alpha tab. Most recently, on 12/13/2021, I d/w the patient "...about his sister being his HCP and if he were to have any intention to change her as a HCP. However, he indicated that he was ok with his sister continuing to be his HCP." Beyond, this discussion, I do not seem to find any other documents in the EMR indicating the patient was looking for changing or voiding his HCP form. Hence, his HCP form is active.     I also d/w Cristina about intubation and she indicated that in the setting of acute respiratory failure, if possible, intubation was to be done. Overall, he is full code.

## 2022-03-01 NOTE — PROGRESS NOTE ADULT - SUBJECTIVE AND OBJECTIVE BOX
INFECTIOUS DISEASE FOLLOW UP NOTE:    Interval History/ROS: Patient is a 66y old  Male who presents with a chief complaint of Septic shock (01 Mar 2022 06:53)      Overnight events:    REVIEW OF SYSTEMS:  CONSTITUTIONAL: No unintentional weight loss; no weakness; no fevers or chills  HEENT: No decrease hearing, tinnitus, ear pain, rhinorrhea, congestion, or sore throat  RESPIRATORY: No cough, wheezing, hemoptysis; no shortness of breath/shortness of breath on exertion; no orthopnea  CARDIOVASCULAR: No chest pain or palpitations  GASTROINTESTINAL: No abdominal or epigastric pain; no change in appetite; no early satiety; no nausea, vomiting, or hematemesis; no diarrhea or constipation; no melena or hematochezia; no change of stool caliber  GENITOURINARY: No dysuria, increased in urinary frequency or hematuria; no urethral discharge  NEUROLOGICAL: No headache/dizziness; no focal numbness or motor weakness  MSK: No joint pain, erythema, or swelling; no back pain  SKIN: No itching, rashes; no recent insect bites  All other ROS negative except noted above    Prior hospital charts reviewed [Yes]  Primary team notes reviewed [Yes]  Other consultant notes reviewed [Yes]    Allergies:  Amiodarone Hydrochloride (Other (Severe))      ANTIMICROBIALS:   cefepime   IVPB 2000 every 12 hours  vancomycin  IVPB 750 every 24 hours      OTHER MEDS: MEDICATIONS  (STANDING):  acetaminophen   IVPB .. 750 once  gabapentin Solution 100 three times a day  methimazole 10 daily  metoclopramide 10 four times a day  mirtazapine 7.5 at bedtime  pantoprazole  Injectable 40 daily  senna 2 at bedtime  sertraline 100 daily  vasopressin Infusion 0.06 <Continuous>      Vital Signs Last 24 Hrs  T(F): 100 (22 @ 07:00), Max: 100.9 (22 @ 05:00)    Vital Signs Last 24 Hrs  HR: 98 (22 @ 09:00) (67 - 138)  BP: --  RR: 37 (22 @ 08:45)  SpO2: 100% (22 @ 09:00) (82% - 100%)  Wt(kg): --    EXAM:  GENERAL: NAD, lying in bed comfortably  HEAD: Atraumatic, Normocephalic  EYES: EOMI, PERRLA, conjunctiva pink and cornea white  ENT: Normal external ears and nose, no discharges; moist mucous membranes, no erythema on posterior oropharynx  NECK: Supple, nontender to palpation; no JVD  CHEST/LUNG: Clear to auscultation bilaterally; No rales, rhonchi, wheezing, or rubs. Unlabored respirations  HEART: Regular rate and rhythm; No murmurs, rubs, or gallops  ABDOMEN: Soft, nontender, nondistended; no rebound tenderness, no guarding; no hepatomegaly; normoactive/hyperactive/hypoactive bowel sounds  EXTREMITIES:  2+ Peripheral Pulses, brisk capillary refill. No clubbing, cyanosis, or petal edema  NERVOUS SYSTEM: Alert and oriented to person, time, place and situation, speech clear. No focal deficits   MSK: FROM all 4 extremities, full and equal strength; no joint erythema, swelling or pain  SKIN: No rashes or lesions, no superficial thrombophlebitis    Labs:                        8.4    23.62 )-----------( 83       ( 01 Mar 2022 06:16 )             26.9         140  |  108  |  20  ----------------------------<  166<H>  4.0   |  20<L>  |  1.01    Ca    9.1      01 Mar 2022 00:08  Phos  2.5       Mg     1.9         TPro  7.3  /  Alb  2.7<L>  /  TBili  0.5  /  DBili  x   /  AST  58<H>  /  ALT  40  /  AlkPhos  133<H>        WBC Trend:  WBC Count: 23.62 (22 @ 06:16)  WBC Count: 21.65 (22 @ 00:08)  WBC Count: 21.54 (22 @ 20:56)  WBC Count: 22.53 (22 @ 15:47)      Creatine Trend:  Creatinine, Serum: 1.01 ()  Creatinine, Serum: 0.98 ()  Creatinine, Serum: 1.14 ()  Creatinine, Serum: 1.31 ()      Liver Biochemical Testing Trend:  Alanine Aminotransferase (ALT/SGPT): 40 ()  Alanine Aminotransferase (ALT/SGPT): 47 *H* ()  Alanine Aminotransferase (ALT/SGPT): 60 *H* ()  Alanine Aminotransferase (ALT/SGPT): 71 *H* ()  Alanine Aminotransferase (ALT/SGPT): SEE NOTE ()  Aspartate Aminotransferase (AST/SGOT): 58 (22 @ 00:08)  Aspartate Aminotransferase (AST/SGOT): 82 (22 @ 15:47)  Aspartate Aminotransferase (AST/SGOT): 139 (22 @ 04:53)  Aspartate Aminotransferase (AST/SGOT): 205 (22 @ 23:13)  Aspartate Aminotransferase (AST/SGOT): SEE NOTE (22 @ 23:13)  Bilirubin Total, Serum: 0.5 ()  Bilirubin Total, Serum: 0.5 ()  Bilirubin Total, Serum: 0.5 ()  Bilirubin Total, Serum: 0.6 ()  Bilirubin Direct, Serum: 0.3 ()      Trend LDH  22 @ 00:08  199  22 @ 23:13  381<H>  22 @ 06:16  227  22 @ 06:14  254<H>  22 @ 06:36  249<H>      Urinalysis Basic - ( 01 Mar 2022 00:42 )    Color: Yellow / Appearance: Clear / S.022 / pH: x  Gluc: x / Ketone: Small  / Bili: Negative / Urobili: Negative   Blood: x / Protein: 30 mg/dL / Nitrite: Negative   Leuk Esterase: Moderate / RBC: 199 /hpf / WBC 22 /HPF   Sq Epi: x / Non Sq Epi: 3 /hpf / Bacteria: Negative        MICROBIOLOGY:  Vancomycin Level, Random: 8.5 ( @ 04:53)    MRSA PCR Result.: NotDetec (22 @ 04:25)  MRSA PCR Result.: NotDetec (21 @ 07:59)  MRSA PCR Result.: NotDetec (10-17-21 @ 21:55)  MRSA PCR Result.: NotDetec (21 @ 23:02)      Culture - Blood (collected 2022 04:26)  Source: .Blood Blood-Peripheral  Preliminary Report:    Growth in aerobic bottle: Gram Negative Rods    Culture - Blood (collected 2022 01:33)  Source: .Blood Blood-Peripheral  Preliminary Report:    Growth in aerobic bottle: Gram Negative Rods    ***Blood Panel PCR results on this specimen are available    approximately 3 hours after the Gram stain result.***    Gram stain, PCR, and/or culture results may not always    correspond due to difference in methodologies.    ************************************************************    This PCR assay was performed by multiplex PCR. This    Assay tests for 66 bacterial and resistance gene targets.    Please refer to the [facility] test directory    at [email or web address] for details.  Organism: Blood Culture PCR  Organism: Blood Culture PCR    Sensitivities:      -  Serratia marcescens: Detec      Method Type: PCR    Culture - Blood (collected 2022 10:22)  Source: .Blood Blood  Final Report:    No Growth Final    Culture - Blood (collected 2022 10:22)  Source: .Blood Blood  Final Report:    No Growth Final    Culture - Blood (collected 2022 22:53)  Source: .Blood Blood  Final Report:    No Growth Final    Culture - Blood (collected 2022 23:02)  Source: .Blood Blood-Venous  Final Report:    Growth in aerobic and anaerobic bottles: Serratia marcescens    ***Blood Panel PCR results on this specimen are available    approximately 3 hours after the Gram stain result.***    Gram stain, PCR, and/or culture results may not always    correspond due to difference in methodologies.    ************************************************************    This PCR assay was performed by multiplex PCR. This    Assay tests for 66 bacterial and resistance gene targets.    Please refer to the [facility] test directory    at [email or web address] for details.  Organism: Blood Culture PCR  Serratia marcescens  Organism: Serratia marcescens    Sensitivities:      -  Amikacin: S <=16      -  Ampicillin: R >16 These ampicillin results predict results for amoxicillin      -  Ampicillin/Sulbactam: R >16/8 Enterobacter, Klebsiella aerogenes, Citrobacter, and Serratia may develop resistance during prolonged therapy (3-4 days)      -  Aztreonam: S <=4      -  Cefazolin: R >16 Enterobacter, Klebsiella aerogenes, Citrobacter, and Serratia may develop resistance during prolonged therapy (3-4 days)      -  Cefepime: S <=2      -  Cefoxitin: R >16      -  Ceftriaxone: S <=1 Enterobacter, Klebsiella aerogenes, Citrobacter, and Serratia may develop resistance during prolonged therapy      -  Ciprofloxacin: R >2      -  Ertapenem: S <=0.5      -  Gentamicin: S <=2      -  Levofloxacin: R 2      -  Meropenem: S <=1      -  Piperacillin/Tazobactam: S <=8      -  Tobramycin: S <=2      -  Trimethoprim/Sulfamethoxazole: S 2/38      Method Type: CLAU  Organism: Blood Culture PCR    Sensitivities:      -  Serratia marcescens: Detec      Method Type: PCR    Culture - Blood (collected 30 Dec 2021 22:07)  Source: .Blood Blood  Final Report:    No Growth Final    Culture - Blood (collected 18 Dec 2021 08:15)  Source: .Blood Blood  Final Report:    No Growth Final    Culture - Blood (collected 13 Dec 2021 05:50)  Source: .Blood Arterial Catheter  Final Report:    Growth in aerobic and anaerobic bottles: Serratia marcescens  Organism: Serratia marcescens  Organism: Serratia marcescens    Sensitivities:      -  Amikacin: S <=16      -  Ampicillin: R >16 These ampicillin results predict results for amoxicillin      -  Ampicillin/Sulbactam: R >16/8 Enterobacter, Klebsiella aerogenes, Citrobacter, and Serratia may develop resistance during prolonged therapy (3-4 days)      -  Aztreonam: S <=4      -  Cefazolin: R >16 Enterobacter, Klebsiella aerogenes, Citrobacter, and Serratia may develop resistance during prolonged therapy (3-4 days)      -  Cefepime: S <=2      -  Cefoxitin: R >16      -  Ceftriaxone: S <=1 Enterobacter, Klebsiella aerogenes, Citrobacter, and Serratia may develop resistance during prolonged therapy      -  Ciprofloxacin: R >2      -  Ertapenem: S <=0.5      -  Gentamicin: S <=2      -  Levofloxacin: R 2      -  Meropenem: S <=1      -  Piperacillin/Tazobactam: S <=8      -  Tobramycin: S 4      -  Trimethoprim/Sulfamethoxazole: S       Method Type: CLAU    Culture - Blood (collected 13 Dec 2021 05:50)  Source: .Blood Blood-Peripheral  Final Report:    Growth in aerobic bottle: Serratia marcescens See previous culture    [id number]    ***Blood Panel PCR results on this specimen are available    approximately 3 hours after the Gram stain result.***    Gram stain, PCR, and/or culture results may not always    correspond due to difference in methodologies.    ************************************************************    This PCR assay was performed by multiplex PCR. This    Assay tests for 66 bacterial and resistance gene targets.    Please refer to the [facility] test directory    at [email or web address] for details.  Organism: Blood Culture PCR  Organism: Blood Culture PCR    Sensitivities:      -  Serratia marcescens: Detec      Method Type: PCR      HIV-1/2 Combo Result: Nonreact (21 @ 08:18)    ABS CD4: 129 /uL (20 @ 08:38)                    COVID-19 PCR: NotDetec (22 @ 13:13)  COVID-19 PCR: NotDetec (22 @ 06:10)  COVID-19 PCR: NotDetec (22 @ 05:55)    COVID-19 [place] Domain AB Interp: Positive (21 @ 04:49)  COVID-19 Nucleocapsid PAT AB Interp: Positive (21 @ 04:49)  COVID-19 [place] Domain AB Interp: Positive (06-15-21 @ 10:15)    Procalcitonin, Serum: 6.34 ()  Procalcitonin, Serum: 10.93 ()      Ferritin, Serum: 1828 ()      Lactate Dehydrogenase, Serum: 199 ()  Lactate Dehydrogenase, Serum: 381 ()    Serum Pro-Brain Natriuretic Peptide: 5486 ()    Troponin T, High Sensitivity Result: 24 ()    Blood Gas Arterial, Lactate: 0.9 ( @ 06:12)  Blood Gas Arterial, Lactate: 0.8 ( @ 00:05)  Blood Gas Arterial, Lactate: 0.8 ( @ 20:55)  Blood Gas Arterial, Lactate: 0.9 ( @ 15:44)      RADIOLOGY:  imaging below personally reviewed    < from: CT Head No Cont (22 @ 13:05) >  IMPRESSION:    No hydrocephalus, acute intracranial hemorrhage, mass effect, or brain   edema.    --- End of Report ---    < end of copied text >   INFECTIOUS DISEASE FOLLOW UP NOTE:    Interval History/ROS: Patient is a 66y old  Male who presents with a chief complaint of Septic shock (01 Mar 2022 06:53)      Overnight events:    REVIEW OF SYSTEMS:  Unable to obtain due to cognitive impairment    Prior hospital charts reviewed [Yes]  Primary team notes reviewed [Yes]  Other consultant notes reviewed [Yes]    Allergies:  Amiodarone Hydrochloride (Other (Severe))t      ANTIMICROBIALS:   cefepime   IVPB 2000 every 12 hours  vancomycin  IVPB 750 every 24 hours      OTHER MEDS: MEDICATIONS  (STANDING):  acetaminophen   IVPB .. 750 once  gabapentin Solution 100 three times a day  methimazole 10 daily  metoclopramide 10 four times a day  mirtazapine 7.5 at bedtime  pantoprazole  Injectable 40 daily  senna 2 at bedtime  sertraline 100 daily  vasopressin Infusion 0.06 <Continuous>      Vital Signs Last 24 Hrs  T(F): 100 (22 @ 07:00), Max: 100.9 (22 @ 05:00)    Vital Signs Last 24 Hrs  HR: 98 (22 @ 09:00) (67 - 138)  BP: --  RR: 37 (22 @ 08:45)  SpO2: 100% (22 @ 09:00) (82% - 100%)  Wt(kg): --    EXAM:  GENERAL: lying in bed, a little tachypneic   HEAD: Atraumatic  EYES: Conjunctiva pink and cornea white  ENT: Normal external ears and nose, no discharges; dry mucous membranes  NECK: Supple, nontender to palpation; stoma at previous trach site; dry secretion; RIJ central line  CHEST/LUNG: + bilateral air entry, no rhonchi  HEART: S1 S2  ABDOMEN: Soft, nontender, nondistended; normoactive bowel sounds; Perc dawn site no erythema or drainage, Drive line site is without erythema  EXTREMITIES: No clubbing, cyanosis, or petal edema  NERVOUS SYSTEM: Awake but not alert and oriented. unable to follow commands. nonverbal  MSK: No joint erythema, swelling  SKIN: No rashes or lesions, no superficial thrombophlebitis  LINES: RIJ central line; Right axillary A line    Labs:                        8.4    23.62 )-----------( 83       ( 01 Mar 2022 06:16 )             26.9         140  |  108  |  20  ----------------------------<  166<H>  4.0   |  20<L>  |  1.01    Ca    9.1      01 Mar 2022 00:08  Phos  2.5       Mg     1.9         TPro  7.3  /  Alb  2.7<L>  /  TBili  0.5  /  DBili  x   /  AST  58<H>  /  ALT  40  /  AlkPhos  133<H>        WBC Trend:  WBC Count: 23.62 (22 @ 06:16)  WBC Count: 21.65 (22 @ 00:08)  WBC Count: 21.54 (22 @ 20:56)  WBC Count: 22.53 (22 @ 15:47)      Creatine Trend:  Creatinine, Serum: 1.01 ()  Creatinine, Serum: 0.98 ()  Creatinine, Serum: 1.14 ()  Creatinine, Serum: 1.31 ()      Liver Biochemical Testing Trend:  Alanine Aminotransferase (ALT/SGPT): 40 ()  Alanine Aminotransferase (ALT/SGPT): 47 *H* ()  Alanine Aminotransferase (ALT/SGPT): 60 *H* ()  Alanine Aminotransferase (ALT/SGPT): 71 *H* ()  Alanine Aminotransferase (ALT/SGPT): SEE NOTE ()  Aspartate Aminotransferase (AST/SGOT): 58 (22 @ 00:08)  Aspartate Aminotransferase (AST/SGOT): 82 (22 @ 15:47)  Aspartate Aminotransferase (AST/SGOT): 139 (22 @ 04:53)  Aspartate Aminotransferase (AST/SGOT): 205 (22 @ 23:13)  Aspartate Aminotransferase (AST/SGOT): SEE NOTE (22 @ 23:13)  Bilirubin Total, Serum: 0.5 ()  Bilirubin Total, Serum: 0.5 ()  Bilirubin Total, Serum: 0.5 ()  Bilirubin Total, Serum: 0.6 ()  Bilirubin Direct, Serum: 0.3 ()      Trend LDH  22 @ 00:08  199  22 @ 23:13  381<H>  22 @ 06:16  227  22 @ 06:14  254<H>  22 @ 06:36  249<H>      Urinalysis Basic - ( 01 Mar 2022 00:42 )    Color: Yellow / Appearance: Clear / S.022 / pH: x  Gluc: x / Ketone: Small  / Bili: Negative / Urobili: Negative   Blood: x / Protein: 30 mg/dL / Nitrite: Negative   Leuk Esterase: Moderate / RBC: 199 /hpf / WBC 22 /HPF   Sq Epi: x / Non Sq Epi: 3 /hpf / Bacteria: Negative        MICROBIOLOGY:  Vancomycin Level, Random: 8.5 ( @ 04:53)    MRSA PCR Result.: NotDetec (22 @ 04:25)  MRSA PCR Result.: NotDetec (21 @ 07:59)  MRSA PCR Result.: NotDetec (10-17-21 @ 21:55)  MRSA PCR Result.: NotDetec (21 @ 23:02)      Culture - Blood (collected 2022 04:26)  Source: .Blood Blood-Peripheral  Preliminary Report:    Growth in aerobic bottle: Gram Negative Rods    Culture - Blood (collected 2022 01:33)  Source: .Blood Blood-Peripheral  Preliminary Report:    Growth in aerobic bottle: Gram Negative Rods    ***Blood Panel PCR results on this specimen are available    approximately 3 hours after the Gram stain result.***    Gram stain, PCR, and/or culture results may not always    correspond due to difference in methodologies.    ************************************************************    This PCR assay was performed by multiplex PCR. This    Assay tests for 66 bacterial and resistance gene targets.    Please refer to the [facility] test directory    at [email or web address] for details.  Organism: Blood Culture PCR  Organism: Blood Culture PCR    Sensitivities:      -  Serratia marcescens: Detec      Method Type: PCR    Culture - Blood (collected 2022 10:22)  Source: .Blood Blood  Final Report:    No Growth Final    Culture - Blood (collected 2022 10:22)  Source: .Blood Blood  Final Report:    No Growth Final    Culture - Blood (collected 2022 22:53)  Source: .Blood Blood  Final Report:    No Growth Final    Culture - Blood (collected 2022 23:02)  Source: .Blood Blood-Venous  Final Report:    Growth in aerobic and anaerobic bottles: Serratia marcescens    ***Blood Panel PCR results on this specimen are available    approximately 3 hours after the Gram stain result.***    Gram stain, PCR, and/or culture results may not always    correspond due to difference in methodologies.    ************************************************************    This PCR assay was performed by multiplex PCR. This    Assay tests for 66 bacterial and resistance gene targets.    Please refer to the [facility] test directory    at [email or web address] for details.  Organism: Blood Culture PCR  Serratia marcescens  Organism: Serratia marcescens    Sensitivities:      -  Amikacin: S <=16      -  Ampicillin: R >16 These ampicillin results predict results for amoxicillin      -  Ampicillin/Sulbactam: R >16/8 Enterobacter, Klebsiella aerogenes, Citrobacter, and Serratia may develop resistance during prolonged therapy (3-4 days)      -  Aztreonam: S <=4      -  Cefazolin: R >16 Enterobacter, Klebsiella aerogenes, Citrobacter, and Serratia may develop resistance during prolonged therapy (3-4 days)      -  Cefepime: S <=2      -  Cefoxitin: R >16      -  Ceftriaxone: S <=1 Enterobacter, Klebsiella aerogenes, Citrobacter, and Serratia may develop resistance during prolonged therapy      -  Ciprofloxacin: R >2      -  Ertapenem: S <=0.5      -  Gentamicin: S <=2      -  Levofloxacin: R 2      -  Meropenem: S <=1      -  Piperacillin/Tazobactam: S <=8      -  Tobramycin: S <=2      -  Trimethoprim/Sulfamethoxazole: S       Method Type: CLAU  Organism: Blood Culture PCR    Sensitivities:      -  Serratia marcescens: Detec      Method Type: PCR    Culture - Blood (collected 30 Dec 2021 22:07)  Source: .Blood Blood  Final Report:    No Growth Final    Culture - Blood (collected 18 Dec 2021 08:15)  Source: .Blood Blood  Final Report:    No Growth Final    Culture - Blood (collected 13 Dec 2021 05:50)  Source: .Blood Arterial Catheter  Final Report:    Growth in aerobic and anaerobic bottles: Serratia marcescens  Organism: Serratia marcescens  Organism: Serratia marcescens    Sensitivities:      -  Amikacin: S <=16      -  Ampicillin: R >16 These ampicillin results predict results for amoxicillin      -  Ampicillin/Sulbactam: R >16/8 Enterobacter, Klebsiella aerogenes, Citrobacter, and Serratia may develop resistance during prolonged therapy (3-4 days)      -  Aztreonam: S <=4      -  Cefazolin: R >16 Enterobacter, Klebsiella aerogenes, Citrobacter, and Serratia may develop resistance during prolonged therapy (3-4 days)      -  Cefepime: S <=2      -  Cefoxitin: R >16      -  Ceftriaxone: S <=1 Enterobacter, Klebsiella aerogenes, Citrobacter, and Serratia may develop resistance during prolonged therapy      -  Ciprofloxacin: R >2      -  Ertapenem: S <=0.5      -  Gentamicin: S <=2      -  Levofloxacin: R 2      -  Meropenem: S <=1      -  Piperacillin/Tazobactam: S <=8      -  Tobramycin: S 4      -  Trimethoprim/Sulfamethoxazole: S 238      Method Type: CLAU    Culture - Blood (collected 13 Dec 2021 05:50)  Source: .Blood Blood-Peripheral  Final Report:    Growth in aerobic bottle: Serratia marcescens See previous culture    [id number]    ***Blood Panel PCR results on this specimen are available    approximately 3 hours after the Gram stain result.***    Gram stain, PCR, and/or culture results may not always    correspond due to difference in methodologies.    ************************************************************    This PCR assay was performed by multiplex PCR. This    Assay tests for 66 bacterial and resistance gene targets.    Please refer to the [facility] test directory    at [email or web address] for details.  Organism: Blood Culture PCR  Organism: Blood Culture PCR    Sensitivities:      -  Serratia marcescens: Detec      Method Type: PCR      HIV-1/2 Combo Result: Nonreact (21 @ 08:18)    ABS CD4: 129 /uL (20 @ 08:38)                    COVID-19 PCR: NotDetec (22 @ 13:13)  COVID-19 PCR: NotDetec (22 @ 06:10)  COVID-19 PCR: NotDetec (22 @ 05:55)    COVID-19 Le[place] Domain AB Interp: Positive (21 @ 04:49)  COVID-19 Nucleocapsid PAT AB Interp: Positive (21 @ 04:49)  COVID-19 [place] Domain AB Interp: Positive (06-15-21 @ 10:15)    Procalcitonin, Serum: 6.34 ()  Procalcitonin, Serum: 10.93 ()      Ferritin, Serum: 1828 ()      Lactate Dehydrogenase, Serum: 199 ()  Lactate Dehydrogenase, Serum: 381 ()    Serum Pro-Brain Natriuretic Peptide: 5486 ()    Troponin T, High Sensitivity Result: 24 ()    Blood Gas Arterial, Lactate: 0.9 ( @ 06:12)  Blood Gas Arterial, Lactate: 0.8 ( @ 00:05)  Blood Gas Arterial, Lactate: 0.8 ( @ 20:55)  Blood Gas Arterial, Lactate: 0.9 ( @ 15:44)      RADIOLOGY:  imaging below personally reviewed    < from: CT Head No Cont (22 @ 13:05) >  IMPRESSION:    No hydrocephalus, acute intracranial hemorrhage, mass effect, or brain   edema.    --- End of Report ---    < end of copied text >

## 2022-03-01 NOTE — PROGRESS NOTE ADULT - SUBJECTIVE AND OBJECTIVE BOX
RICKY HAMPTON  MRN-[id number]  Patient is a 66y old  Male who presents with a chief complaint of Septic shock (2022 21:26)    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 s/p HM2 LVAD in 2017 at  (due to severe PAD) with TV ring, multiple GI bleeds, thus maintained off AC, CKD 3prior COVID-19 infection in 2020, recurrent syncope post LVAD s/p ILR, s/p prolonged complex hospitalization from -2021 initially admitted with abdominal pain complicated by GIB, respiratory failure s/p trach, multiple infections, s/p cholecystotomy tube and SMA stent 10/2021, ultimately discharged to [facility] rehab and then returned to [facility] for trach decannulation , readmitted in 2022 with recurrent COVID-19 infection, initially in distributive shock. Blood cultures were also positive for serratia marcescens which he has had in the past.     Pt was transferred to [facility] CICU from [facility]. He was sent to their ED from a rehab center due to AMS and tachycardia. At [facility], they noted a WBC greater than 20, tachycardia and hypercarbia on ABG. Pt was placed on BiPAP and transferred to [facility] CICU for concern for sepsis vs septic shock.      (2022 22:20)      Hospital course:    Today:    Physical Exam:  Vital Signs Last 24 Hrs  T(C): 36.9 (01 Mar 2022 04:00), Max: 37.2 (2022 15:00)  T(F): 98.4 (01 Mar 2022 04:00), Max: 99 (2022 15:00)  HR: 82 (01 Mar 2022 06:45) (67 - 138)  BP: --  BP(mean): 86 (2022 20:00) (86 - 86)  RR: 30 (01 Mar 2022 06:45) (5 - 30)  SpO2: 100% (01 Mar 2022 06:45) (82% - 100%)    PHYSICAL EXAM:  Constitutional: Patient laying in bed, NAD  Head: Atraumatic, normocephalic  Eyes: No scleral icterus. PERRLA.  ENMT: Dry mucous membrane. Uvula midline. Stoma  dry appearing  Neck: Supple, No JVD  Respiratory: CTA B/L although overall decreased breath sounds 2/2 poor effort. No wheezes, rales or rhonchi   Cardiovascular: LVAD hum present   Gastrointestinal: Nondistended, BSx4, soft, nontender.  Extremities: Moves all extremities. Warm, no edema, nontender  Vascular: 2+ DP/PT pulses B/L   Neurological: Awake, aphasic, not answering questions. Follows commands. B/L weakness in lower and upper extremities   Skin: No rashes on exposed skin       ============================I/O===========================   I&O's Detail    2022 07:01  -  2022 07:00  --------------------------------------------------------  IN:    Albumin 5%  - 250 mL: 250 mL    IV PiggyBack: 800 mL    Lactated Ringers Bolus: 1000 mL    PRBCs (Packed Red Blood Cells): 300 mL    Sodium Chloride 0.9% Bolus: 2000 mL    Vasopressin: 13.2 mL  Total IN: 4363.2 mL    OUT:    Drain (mL): 70 mL    Voided (mL): 605 mL  Total OUT: 675 mL    Total NET: 3688.2 mL      2022 07:  -  01 Mar 2022 06:53  --------------------------------------------------------  IN:    Albumin 5%  - 250 mL: 250 mL    Enteral Tube Flush: 170 mL    IV PiggyBack: 200 mL    Lidocaine: 52.5 mL    PRBCs (Packed Red Blood Cells): 300 mL    Vasopressin: 45 mL    Vasopressin: 40.8 mL  Total IN: 1058.3 mL    OUT:    Drain (mL): 135 mL    Voided (mL): 1390 mL  Total OUT: 1525 mL    Total NET: -466.7 mL        ============================ LABS =========================                        8.4    23.62 )-----------( 83       ( 01 Mar 2022 06:16 )             26.9     03-    140  |  108  |  20  ----------------------------<  166<H>  4.0   |  20<L>  |  1.01    Ca    9.1      01 Mar 2022 00:08  Phos  2.5     03-  Mg     1.9         TPro  7.3  /  Alb  2.7<L>  /  TBili  0.5  /  DBili  x   /  AST  58<H>  /  ALT  40  /  AlkPhos  133<H>  03    LIVER FUNCTIONS - ( 01 Mar 2022 00:08 )  Alb: 2.7 g/dL / Pro: 7.3 g/dL / ALK PHOS: 133 U/L / ALT: 40 U/L / AST: 58 U/L / GGT: x           PT/INR - ( 01 Mar 2022 00:08 )   PT: 16.0 sec;   INR: 1.33 ratio         PTT - ( 01 Mar 2022 00:08 )  PTT:25.0 sec  ABG - ( 01 Mar 2022 06:12 )  pH, Arterial: 7.34  pH, Blood: x     /  pCO2: 37    /  pO2: 129   / HCO3: 20    / Base Excess: -5.3  /  SaO2: 100.0             Urinalysis Basic - ( 01 Mar 2022 00:42 )    Color: Yellow / Appearance: Clear / S.022 / pH: x  Gluc: x / Ketone: Small  / Bili: Negative / Urobili: Negative   Blood: x / Protein: 30 mg/dL / Nitrite: Negative   Leuk Esterase: Moderate / RBC: 199 /hpf / WBC 22 /HPF   Sq Epi: x / Non Sq Epi: 3 /hpf / Bacteria: Negative      ======================Micro/Rad/Cardio=================  Culture: Reviewed   CXR: Reviewed  Echo:Reviewed  ======================================================  PAST MEDICAL & SURGICAL HISTORY:  CHF (congestive heart failure)    CAD (coronary artery disease)    Depression    Pleural effusion    History of  novel coronavirus disease (COVID-19)  2020    Hemorrhoids    Bleeding hemorrhoids    Peripheral arterial disease    Claudication    BPH with urinary obstruction    ACC/AHA stage D systolic heart failure    Anticoagulation goal of INR 2.0 to 2.5    Falls    Clavicle fracture    CKD (chronic kidney disease), stage III    Iron deficiency anemia    H/O epistaxis    Vertigo    GI bleed    S/P TVR (tricuspid valve repair)    S/P ventricular assist device    S/P endoscopy    H/O tracheostomy      ====================ASSESSMENT ==============  65M PMH LVAD, recurrent serratia bacteremia, pneumonia, intubated/trach x2 cycles, chronic gall bladder disease s/p percutaneous cholecystectomy, SMA ischemia s/p stent, cachexia who presented from [facility] for septic shock    Plan:  ====================== NEUROLOGY=====================  AMS  - per nursing home staff pt has been altered for one week  - pt AAOx0, not verbally responding to staff  - pt following commands  - continue to monitor neuro status  - CTH at OSH negative for acute pathology   -  CTH: Mild to mod white matter microvascular ischemic disease. No acute pathology     ==================== RESPIRATORY======================  Hypercarbia  - Pt hypercarbic at OSH, placed on BiPAP   - Weaned BiPAP after admission to NC with ABG stable. Mid-day on , patient hypoventilating so place back on BiPAP but place back to NC 3L this AM  - continue to monitor respiratory status   - wean NC as tolerated with goal SpO2 >94%    Hx trach w/ stoma  - Pt trach x2 cycles in past  - most recently in setting of COVID PNA in 2022  - Pt decannulated after admission  - purulent drainage in stoma site, cultured pending results  - ENT states no sign of infection at Stoma site- Plan for possible TE fistula closure when pt is stable as per ENT    ====================CARDIOVASCULAR==================  ACC/AHA stage D systolic heart failure s/p LVAD  - pt appeared hypovolemic on admission  - c/w Vaso gtt for HD instability, wean as tolerated with goal SBP >100  - s/p 1L NS, 250 albumin x1, 2L LR overnight. Fluid responsive- able to wean pressors with fluid overnight. Will give additional fluid prn with goal CVP 6-8. Will give additional 1L LR now.   - Hgb 7, s/p 1U pRBC overnight with incomplete response. continue to monitor    - central sat sent from intro 81.8 with negative lactate x4  - LVAD coordinator and HF attending contacted     Ventricular Tachycardia  - pt has history of VT on previous admissions, takes home mexiletine  - NSVT seen on tele in CICU  - lido gtt @1   - transition to mexiletine when able to take PO    ===================HEMATOLOGIC/ONC ===================  Anemia  - Hgb on admission 7.4 with subsequent drop to 6.6  - s/p 1u PRBC with incomplete response. will monitor cbc q6hr  - of note, pt has hx of GI bleeds  - will assess BM when able   - transfuse as needed if hgb <7      ===================== RENAL =========================  RASHAWN  - SCr 1.3 (baseline 0.8-0.9)  - continue to monitor SCr, BUN, lytes, and I&Os  - replete lytes prn with goal K 4-4.5 and mg >2      ==================== GASTROINTESTINAL===================\  Hx GI bleed in past req multiple transfusions   - no overt bleeding noted on this admission   - monitor BM for signs of blood       albumin human  5% IVPB 250 milliLiter(s) IV Intermittent once  potassium chloride  20 mEq/100 mL IVPB 20 milliEquivalent(s) IV Intermittent every 2 hours    =======================    ENDOCRINE  =====================  Hx Hyperthyroidism   - continue home methimazole   - pending TSH     methimazole 10 milliGRAM(s) Oral daily    ========================INFECTIOUS DISEASE================  Septic shock  - pt arrived tachycardic 120s, hypotensive MAPs 50-60, AMS  - WBC 27  - pt got zosyn and vancomycin x1 at OSH. Will switch zosyn to cefepime and continue with vanc 750mg bid   - f/u infectious workup   - stoma with purulent pus on presentation- culture sent. This AM dry appearing without erythema. ENT consulted   - CT C/A/P at OSH: cholecystostomy in place, patchy opacities vs. atelectasis as per written transfer report (CD and official report not sent with transfer)   - OSH BCX prelim gram neg rods in anaerobic bottle. continue to follow [facility] (BCX taken prior to abx initiation)      Patient requires continuous monitoring with bedside rhythm monitoring, arterial line, pulse oximetry, ventilator monitoring and intermittent blood gas analysis.  Care plan discussed with ICU care team.  Patient remained critical; required more than usual post op care; I have spent >45 minutes providing non-routine post op care, in addition to intial critical time provided by CICU attending Dr. Jensen , re-evaluated multiple times during the day.         RICKY HAMPTON  MRN-[id number]  Patient is a 66y old  Male who presents with a chief complaint of Septic shock (2022 21:26)    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 s/p HM2 LVAD in 2017 at  (due to severe PAD) with TV ring, multiple GI bleeds, thus maintained off AC, CKD 3prior COVID-19 infection in 2020, recurrent syncope post LVAD s/p ILR, s/p prolonged complex hospitalization from -2021 initially admitted with abdominal pain complicated by GIB, respiratory failure s/p trach, multiple infections, s/p cholecystotomy tube and SMA stent 10/2021, ultimately discharged to [facility] rehab and then returned to [facility] for trach decannulation , readmitted in 2022 with recurrent COVID-19 infection, initially in distributive shock. Blood cultures were also positive for serratia marcescens which he has had in the past.     Pt was transferred to [facility] CICU from [facility]. He was sent to their ED from a rehab center due to AMS and tachycardia. At [facility], they noted a WBC greater than 20, tachycardia and hypercarbia on ABG. Pt was placed on BiPAP and transferred to [facility] CICU for concern for sepsis vs septic shock.      (2022 22:20)    Today:  BCX +- growing serratia   albumin 250mL x1    Physical Exam:  Vital Signs Last 24 Hrs  T(C): 36.9 (01 Mar 2022 04:00), Max: 37.2 (2022 15:00)  T(F): 98.4 (01 Mar 2022 04:00), Max: 99 (2022 15:00)  HR: 82 (01 Mar 2022 06:45) (67 - 138)  BP: --  BP(mean): 86 (2022 20:00) (86 - 86)  RR: 30 (01 Mar 2022 06:45) (5 - 30)  SpO2: 100% (01 Mar 2022 06:45) (82% - 100%)    PHYSICAL EXAM:  Constitutional: Patient laying in bed, NAD  Head: Atraumatic, normocephalic  Eyes: No scleral icterus. PERRLA.  ENMT: Dry mucous membrane. Uvula midline. Stoma  dry appearing  Neck: Supple, No JVD  Respiratory: CTA B/L although overall decreased breath sounds 2/2 poor effort. No wheezes, rales or rhonchi   Cardiovascular: LVAD hum present   Gastrointestinal: Nondistended, BSx4, soft, nontender.  Extremities: Moves all extremities. Warm, no edema, nontender  Vascular: 2+ DP/PT pulses B/L   Neurological: Awake, aphasic, not answering questions. Follows commands. B/L weakness in lower and upper extremities   Skin: No rashes on exposed skin       ============================I/O===========================   I&O's Detail    2022 07:01  -  2022 07:00  --------------------------------------------------------  IN:    Albumin 5%  - 250 mL: 250 mL    IV PiggyBack: 800 mL    Lactated Ringers Bolus: 1000 mL    PRBCs (Packed Red Blood Cells): 300 mL    Sodium Chloride 0.9% Bolus: 2000 mL    Vasopressin: 13.2 mL  Total IN: 4363.2 mL    OUT:    Drain (mL): 70 mL    Voided (mL): 605 mL  Total OUT: 675 mL    Total NET: 3688.2 mL      2022 07:  -  01 Mar 2022 06:53  --------------------------------------------------------  IN:    Albumin 5%  - 250 mL: 250 mL    Enteral Tube Flush: 170 mL    IV PiggyBack: 200 mL    Lidocaine: 52.5 mL    PRBCs (Packed Red Blood Cells): 300 mL    Vasopressin: 45 mL    Vasopressin: 40.8 mL  Total IN: 1058.3 mL    OUT:    Drain (mL): 135 mL    Voided (mL): 1390 mL  Total OUT: 1525 mL    Total NET: -466.7 mL        ============================ LABS =========================                        8.4    23.62 )-----------( 83       ( 01 Mar 2022 06:16 )             26.9     03-    140  |  108  |  20  ----------------------------<  166<H>  4.0   |  20<L>  |  1.01    Ca    9.1      01 Mar 2022 00:08  Phos  2.5     03  Mg     1.9         TPro  7.3  /  Alb  2.7<L>  /  TBili  0.5  /  DBili  x   /  AST  58<H>  /  ALT  40  /  AlkPhos  133<H>  03    LIVER FUNCTIONS - ( 01 Mar 2022 00:08 )  Alb: 2.7 g/dL / Pro: 7.3 g/dL / ALK PHOS: 133 U/L / ALT: 40 U/L / AST: 58 U/L / GGT: x           PT/INR - ( 01 Mar 2022 00:08 )   PT: 16.0 sec;   INR: 1.33 ratio         PTT - ( 01 Mar 2022 00:08 )  PTT:25.0 sec  ABG - ( 01 Mar 2022 06:12 )  pH, Arterial: 7.34  pH, Blood: x     /  pCO2: 37    /  pO2: 129   / HCO3: 20    / Base Excess: -5.3  /  SaO2: 100.0             Urinalysis Basic - ( 01 Mar 2022 00:42 )    Color: Yellow / Appearance: Clear / S.022 / pH: x  Gluc: x / Ketone: Small  / Bili: Negative / Urobili: Negative   Blood: x / Protein: 30 mg/dL / Nitrite: Negative   Leuk Esterase: Moderate / RBC: 199 /hpf / WBC 22 /HPF   Sq Epi: x / Non Sq Epi: 3 /hpf / Bacteria: Negative      ======================Micro/Rad/Cardio=================  Culture: Reviewed   CXR: Reviewed  Echo:Reviewed  ======================================================  PAST MEDICAL & SURGICAL HISTORY:  CHF (congestive heart failure)    CAD (coronary artery disease)    Depression    Pleural effusion    History of  novel coronavirus disease (COVID-19)  2020    Hemorrhoids    Bleeding hemorrhoids    Peripheral arterial disease    Claudication    BPH with urinary obstruction    ACC/AHA stage D systolic heart failure    Anticoagulation goal of INR 2.0 to 2.5    Falls    Clavicle fracture    CKD (chronic kidney disease), stage III    Iron deficiency anemia    H/O epistaxis    Vertigo    GI bleed    S/P TVR (tricuspid valve repair)    S/P ventricular assist device    S/P endoscopy    H/O tracheostomy      ====================ASSESSMENT ==============  65M PMH LVAD, recurrent serratia bacteremia, pneumonia, intubated/trach x2 cycles, chronic gall bladder disease s/p percutaneous cholecystectomy, SMA ischemia s/p stent, cachexia who presented from [facility] for septic shock    Plan:  ====================== NEUROLOGY=====================  AMS  - per nursing home staff pt has been altered for one week  - pt AAOx0, not verbally responding to staff  - pt following commands  - continue to monitor neuro status  - CTH at OSH negative for acute pathology   -  CTH: Mild to mod white matter microvascular ischemic disease. No acute pathology     ==================== RESPIRATORY======================  Hypercarbia  - Pt hypercarbic at OSH, placed on BiPAP   - Weaned BiPAP after admission to NC with ABG stable. Mid-day on , patient hypoventilating so place back on BiPAP but place back to NC 3L this AM  - continue to monitor respiratory status   - wean NC as tolerated with goal SpO2 >94%    Hx trach w/ stoma  - Pt trach x2 cycles in past  - most recently in setting of COVID PNA in 2022  - Pt decannulated after admission  - purulent drainage in stoma site, cultured pending results  - ENT states no sign of infection at Stoma site- Plan for possible TE fistula closure when pt is stable as per ENT    ====================CARDIOVASCULAR==================  ACC/AHA stage D systolic heart failure s/p LVAD  - pt appeared hypovolemic on admission  - c/w Vaso gtt for HD instability, wean as tolerated with goal SBP >100  - s/p 1L NS, 250 albumin x1, 2L LR overnight. Fluid responsive- able to wean pressors with fluid overnight. Will give additional fluid prn with goal CVP 6-8. Will give additional 1L LR now.   - Hgb 7, s/p 1U pRBC overnight with incomplete response. continue to monitor    - central sat sent from intro 81.8 with negative lactate x4  - LVAD coordinator and HF attending contacted     Ventricular Tachycardia  - pt has history of VT on previous admissions, takes home mexiletine  - NSVT seen on tele in CICU  - lido gtt @1   - transition to mexiletine when able to take PO    ===================HEMATOLOGIC/ONC ===================  Anemia  - Hgb on admission 7.4 with subsequent drop to 6.6  - s/p 1u PRBC with incomplete response. will monitor cbc q6hr  - of note, pt has hx of GI bleeds  - will assess BM when able   - transfuse as needed if hgb <7      ===================== RENAL =========================  RASHAWN  - SCr 1.3 (baseline 0.8-0.9)  - continue to monitor SCr, BUN, lytes, and I&Os  - replete lytes prn with goal K 4-4.5 and mg >2      ==================== GASTROINTESTINAL===================\  Hx GI bleed in past req multiple transfusions   - no overt bleeding noted on this admission   - monitor BM for signs of blood. h/h stable for past 24 hrs without any blood noted in BM  - c/w PPI  - Now with NGT, consider starting TF today      albumin human  5% IVPB 250 milliLiter(s) IV Intermittent once  potassium chloride  20 mEq/100 mL IVPB 20 milliEquivalent(s) IV Intermittent every 2 hours    =======================    ENDOCRINE  =====================  Hx Hyperthyroidism   - continue home methimazole   - pending TSH     methimazole 10 milliGRAM(s) Oral daily    ========================INFECTIOUS DISEASE================  Septic shock  - pt arrived tachycardic 120s, hypotensive MAPs 50-60, AMS  - WBC 27  - pt got zosyn and vancomycin x1 at OSH. Will switch zosyn to cefepime and continue with vanc 750mg bid   - f/u infectious workup. BCX  gram neg rods x4 bottles-- growing serratia. pending sensitives   - f/u stoma culture sent   - CT C/A/P at OSH: cholecystostomy in place, patchy opacities vs. atelectasis as per written transfer report (CD and official report not sent with transfer)   - OSH BCX prelim gram neg rods in anaerobic bottle. continue to follow [facility] (BCX taken prior to abx initiation)  - ID following       Patient requires continuous monitoring with bedside rhythm monitoring, arterial line, pulse oximetry, ventilator monitoring and intermittent blood gas analysis.  Care plan discussed with ICU care team.  Patient remained critical; required more than usual post op care; I have spent >45 minutes providing non-routine post op care, in addition to intial critical time provided by CICU attending Dr. Jensen , re-evaluated multiple times during the day.         RICKY HAMPTON  MRN-[id number]  Patient is a 66y old  Male who presents with a chief complaint of Septic shock (2022 21:26)    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 s/p HM2 LVAD in 2017 at  (due to severe PAD) with TV ring, multiple GI bleeds, thus maintained off AC, CKD 3prior COVID-19 infection in 2020, recurrent syncope post LVAD s/p ILR, s/p prolonged complex hospitalization from -2021 initially admitted with abdominal pain complicated by GIB, respiratory failure s/p trach, multiple infections, s/p cholecystotomy tube and SMA stent 10/2021, ultimately discharged to [facility] rehab and then returned to [facility] for trach decannulation , readmitted in 2022 with recurrent COVID-19 infection, initially in distributive shock. Blood cultures were also positive for serratia marcescens which he has had in the past.     Pt was transferred to [facility] CICU from [facility]. He was sent to their ED from a rehab center due to AMS and tachycardia. At [facility], they noted a WBC greater than 20, tachycardia and hypercarbia on ABG. Pt was placed on BiPAP and transferred to [facility] CICU for concern for sepsis vs septic shock.      (2022 22:20)    Today:  BCX +- growing serratia   albumin 250mL x2    Physical Exam:  Vital Signs Last 24 Hrs  T(C): 36.9 (01 Mar 2022 04:00), Max: 37.2 (2022 15:00)  T(F): 98.4 (01 Mar 2022 04:00), Max: 99 (2022 15:00)  HR: 82 (01 Mar 2022 06:45) (67 - 138)  BP: --  BP(mean): 86 (2022 20:00) (86 - 86)  RR: 30 (01 Mar 2022 06:45) (5 - 30)  SpO2: 100% (01 Mar 2022 06:45) (82% - 100%)    PHYSICAL EXAM:  Constitutional: Patient laying in bed, NAD  Head: Atraumatic, normocephalic  Eyes: No scleral icterus. PERRLA.  ENMT: Dry mucous membrane. Uvula midline. Stoma  dry appearing  Neck: Supple, No JVD  Respiratory: CTA B/L although overall decreased breath sounds 2/2 poor effort. No wheezes, rales or rhonchi   Cardiovascular: LVAD hum present   Gastrointestinal: Nondistended, BSx4, soft, nontender.  Extremities: Moves all extremities. Warm, no edema, nontender  Vascular: 2+ DP/PT pulses B/L   Neurological: Awake, aphasic, not answering questions. Follows commands. B/L weakness in lower and upper extremities   Skin: No rashes on exposed skin       ============================I/O===========================   I&O's Detail    2022 07:01  -  2022 07:00  --------------------------------------------------------  IN:    Albumin 5%  - 250 mL: 250 mL    IV PiggyBack: 800 mL    Lactated Ringers Bolus: 1000 mL    PRBCs (Packed Red Blood Cells): 300 mL    Sodium Chloride 0.9% Bolus: 2000 mL    Vasopressin: 13.2 mL  Total IN: 4363.2 mL    OUT:    Drain (mL): 70 mL    Voided (mL): 605 mL  Total OUT: 675 mL    Total NET: 3688.2 mL      2022 07:  -  01 Mar 2022 06:53  --------------------------------------------------------  IN:    Albumin 5%  - 250 mL: 250 mL    Enteral Tube Flush: 170 mL    IV PiggyBack: 200 mL    Lidocaine: 52.5 mL    PRBCs (Packed Red Blood Cells): 300 mL    Vasopressin: 45 mL    Vasopressin: 40.8 mL  Total IN: 1058.3 mL    OUT:    Drain (mL): 135 mL    Voided (mL): 1390 mL  Total OUT: 1525 mL    Total NET: -466.7 mL        ============================ LABS =========================                        8.4    23.62 )-----------( 83       ( 01 Mar 2022 06:16 )             26.9     03-    140  |  108  |  20  ----------------------------<  166<H>  4.0   |  20<L>  |  1.01    Ca    9.1      01 Mar 2022 00:08  Phos  2.5     03  Mg     1.9         TPro  7.3  /  Alb  2.7<L>  /  TBili  0.5  /  DBili  x   /  AST  58<H>  /  ALT  40  /  AlkPhos  133<H>  03    LIVER FUNCTIONS - ( 01 Mar 2022 00:08 )  Alb: 2.7 g/dL / Pro: 7.3 g/dL / ALK PHOS: 133 U/L / ALT: 40 U/L / AST: 58 U/L / GGT: x           PT/INR - ( 01 Mar 2022 00:08 )   PT: 16.0 sec;   INR: 1.33 ratio         PTT - ( 01 Mar 2022 00:08 )  PTT:25.0 sec  ABG - ( 01 Mar 2022 06:12 )  pH, Arterial: 7.34  pH, Blood: x     /  pCO2: 37    /  pO2: 129   / HCO3: 20    / Base Excess: -5.3  /  SaO2: 100.0             Urinalysis Basic - ( 01 Mar 2022 00:42 )    Color: Yellow / Appearance: Clear / S.022 / pH: x  Gluc: x / Ketone: Small  / Bili: Negative / Urobili: Negative   Blood: x / Protein: 30 mg/dL / Nitrite: Negative   Leuk Esterase: Moderate / RBC: 199 /hpf / WBC 22 /HPF   Sq Epi: x / Non Sq Epi: 3 /hpf / Bacteria: Negative      ======================Micro/Rad/Cardio=================  Culture: Reviewed   CXR: Reviewed  Echo:Reviewed  ======================================================  PAST MEDICAL & SURGICAL HISTORY:  CHF (congestive heart failure)    CAD (coronary artery disease)    Depression    Pleural effusion    History of  novel coronavirus disease (COVID-19)  2020    Hemorrhoids    Bleeding hemorrhoids    Peripheral arterial disease    Claudication    BPH with urinary obstruction    ACC/AHA stage D systolic heart failure    Anticoagulation goal of INR 2.0 to 2.5    Falls    Clavicle fracture    CKD (chronic kidney disease), stage III    Iron deficiency anemia    H/O epistaxis    Vertigo    GI bleed    S/P TVR (tricuspid valve repair)    S/P ventricular assist device    S/P endoscopy    H/O tracheostomy      ====================ASSESSMENT ==============  65M PMH LVAD, recurrent serratia bacteremia, pneumonia, intubated/trach x2 cycles, chronic gall bladder disease s/p percutaneous cholecystectomy, SMA ischemia s/p stent, cachexia who presented from [facility] for septic shock    Plan:  ====================== NEUROLOGY=====================  AMS  - per nursing home staff pt has been altered for one week  - pt AAOx0, not verbally responding to staff  - pt following commands  - continue to monitor neuro status  - CTH at OSH negative for acute pathology   -  CTH: Mild to mod white matter microvascular ischemic disease. No acute pathology   - Will follow up with radiology to confirm but likely unable to get Brain MRI  LVAD  - consider neuro consult in AM if continues with AMS with appropriate abx coverage for sepsis.     ==================== RESPIRATORY======================  Hypercarbia  - Pt hypercarbic at OSH, placed on BiPAP   - Weaned BiPAP after admission to NC with ABG stable. Mid-day on , patient hypoventilating so place back on BiPAP but place back to NC 3L this AM  - continue to monitor respiratory status   - wean NC as tolerated with goal SpO2 >94%    Hx trach w/ stoma  - Pt trach x2 cycles in past  - most recently in setting of COVID PNA in 2022  - Pt decannulated after admission  - purulent drainage in stoma site, cultured pending results  - ENT states no sign of infection at Stoma site- Plan for possible TE fistula closure when pt is stable as per ENT    ====================CARDIOVASCULAR==================  ACC/AHA stage D systolic heart failure s/p LVAD  - pt appeared hypovolemic on admission  - c/w Vaso gtt for HD instability, wean as tolerated with goal SBP >100  - s/p 1L NS, 250 albumin x1, 2L LR overnight. Fluid responsive- able to wean pressors with fluid overnight. Will give additional fluid prn with goal CVP 6-8. Will give additional 1L LR now.   - Hgb 7, s/p 1U pRBC overnight with incomplete response. continue to monitor    - central sat sent from intro 81.8 with negative lactate x4  - LVAD coordinator and HF attending contacted     Ventricular Tachycardia  - pt has history of VT on previous admissions, takes home mexiletine  - NSVT seen on tele in CICU  - lido gtt d/c yesterday without significant ectopy noted on tele. Restarted on home mexiletine as now with NGT    ===================HEMATOLOGIC/ONC ===================  Anemia  - Hgb on admission 7.4 with subsequent drop to 6.6  - s/p 2u PRBC . Now h/h has been stable >24hrs. Will check cbc q8, if remains stable can be checked q12   - of note, pt has hx of GI bleeds  - no blood noted in BM, continue to monitor   - transfuse as needed if hgb <7      ===================== RENAL =========================  RASHAWN, now resolved   - continue to monitor SCr, BUN, lytes, and I&Os  - replete lytes prn with goal K 4-4.5 and mg >2      ==================== GASTROINTESTINAL===================\  Hx GI bleed in past req multiple transfusions   - no overt bleeding noted on this admission   - monitor BM for signs of blood. h/h stable for past 24 hrs without any blood noted in BM  - c/w PPI  - Now with NGT, consider starting TF today      albumin human  5% IVPB 250 milliLiter(s) IV Intermittent once  potassium chloride  20 mEq/100 mL IVPB 20 milliEquivalent(s) IV Intermittent every 2 hours    =======================    ENDOCRINE  =====================  Hx Hyperthyroidism   - continue home methimazole   - pending TSH     methimazole 10 milliGRAM(s) Oral daily    ========================INFECTIOUS DISEASE================  Septic shock  - pt arrived tachycardic 120s, hypotensive MAPs 50-60, AMS  - WBC 27  - pt got zosyn and vancomycin x1 at OSH. Will switch zosyn to cefepime and continue with vanc 750mg bid. can likely d/c vanco but will confirm with ID  - f/u infectious workup. BCX  gram neg rods x4 bottles-- growing serratia. pending sensitives   - f/u stoma culture sent   - CT C/A/P at OSH: cholecystostomy in place, patchy opacities vs. atelectasis as per written transfer report (CD and official report not sent with transfer)   - OSH BCX prelim gram neg rods in anaerobic bottle. continue to follow [facility] (BCX taken prior to abx initiation)  - ID following       Patient requires continuous monitoring with bedside rhythm monitoring, arterial line, pulse oximetry, ventilator monitoring and intermittent blood gas analysis.  Care plan discussed with ICU care team.  Patient remained critical; required more than usual post op care; I have spent >45 minutes providing non-routine post op care, in addition to intial critical time provided by CICU attending Dr. Jensen , re-evaluated multiple times during the day.

## 2022-03-01 NOTE — PROGRESS NOTE ADULT - SUBJECTIVE AND OBJECTIVE BOX
RICKY HAMPTON  MRN-[id number]  Patient is a 66y old  Male who presents with a chief complaint of Septic shock (01 Mar 2022 18:22)    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 s/p HM2 LVAD in 2017 at  (due to severe PAD) with TV ring, multiple GI bleeds, thus maintained off AC, CKD 3prior COVID-19 infection in 2020, recurrent syncope post LVAD s/p ILR, s/p prolonged complex hospitalization from -2021 initially admitted with abdominal pain complicated by GIB, respiratory failure s/p trach, multiple infections, s/p cholecystotomy tube and SMA stent 10/2021, ultimately discharged to [facility] rehab and then returned to [facility] for trach decannulation , readmitted in 2022 with recurrent COVID-19 infection, initially in distributive shock. Blood cultures were also positive for serratia marcescens which he has had in the past.     Pt was transferred to [facility] CICU from [facility]. He was sent to their ED from a rehab center due to AMS and tachycardia. At [facility], they noted a WBC greater than 20, tachycardia and hypercarbia on ABG. Pt was placed on BiPAP and transferred to [facility] CICU for concern for sepsis vs septic shock.      (2022 22:20)      Hospital Course:  3/1 Transferred here to the CICU due to sepsis v septic shock    24 HOUR EVENTS:    REVIEW OF SYSTEMS:    CONSTITUTIONAL: No weakness, fevers or chills  EYES/ENT: No visual changes;  No vertigo or throat pain   NECK: No pain or stiffness  RESPIRATORY: No cough, wheezing, hemoptysis; No shortness of breath  CARDIOVASCULAR: No chest pain or palpitations  GASTROINTESTINAL: No abdominal or epigastric pain. No nausea, vomiting, or hematemesis; No diarrhea or constipation. No melena or hematochezia.  GENITOURINARY: No dysuria, frequency or hematuria  NEUROLOGICAL: No numbness or weakness  SKIN: No itching, rashes      ICU Vital Signs Last 24 Hrs  T(C): 37.9 (01 Mar 2022 19:00), Max: 38.4 (01 Mar 2022 09:00)  T(F): 100.2 (01 Mar 2022 19:00), Max: 101.1 (01 Mar 2022 09:00)  HR: 110 (01 Mar 2022 21:00) (67 - 124)  BP: --  BP(mean): 78 (01 Mar 2022 19:30) (78 - 78)  ABP: 88/74 (01 Mar 2022 21:00) (77/63 - 160/85)  ABP(mean): 80 (01 Mar 2022 21:00) (68 - 139)  RR: 38 (01 Mar 2022 21:00) (5 - 40)  SpO2: 96% (01 Mar 2022 21:00) (94% - 100%)      CVP(mm Hg): 4 (22 @ 21:00) (0 - 273)  CO: --  CI: --  PA: --  PA(mean): --  PA(direct): --  PCWP: --  LA: --  RA: --  SVR: --  SVRI: --  PVR: --  PVRI: --  I&O's Summary    2022 07:01  -  01 Mar 2022 07:00  --------------------------------------------------------  IN: 1060.7 mL / OUT: 1600 mL / NET: -539.3 mL    01 Mar 2022 07:01  -  01 Mar 2022 21:51  --------------------------------------------------------  IN: 1858.4 mL / OUT: 745 mL / NET: 1113.4 mL        CAPILLARY BLOOD GLUCOSE    CAPILLARY BLOOD GLUCOSE      POCT Blood Glucose.: 138 mg/dL (01 Mar 2022 17:21)      PHYSICAL EXAM:  Constitutional: Patient laying in bed, NAD  Head: Atraumatic, normocephalic  Eyes: No scleral icterus. PERRLA.  ENMT: Dry mucous membrane. Uvula midline. Stoma  dry appearing  Neck: Supple, No JVD  Respiratory: CTA B/L although overall decreased breath sounds 2/2 poor effort. No wheezes, rales or rhonchi   Cardiovascular: LVAD hum present   Gastrointestinal: Nondistended, BSx4, soft, nontender.  Extremities: Moves all extremities. Warm, no edema, nontender  Vascular: 2+ DP/PT pulses B/L   Neurological: Awake, aphasic, not answering questions. Follows commands. B/L weakness in lower and upper extremities   Skin: No rashes on exposed skin     ============================I/O===========================   I&O's Detail    2022 07:  -  01 Mar 2022 07:00  --------------------------------------------------------  IN:    Albumin 5%  - 250 mL: 250 mL    Enteral Tube Flush: 170 mL    IV PiggyBack: 200 mL    Lidocaine: 52.5 mL    PRBCs (Packed Red Blood Cells): 300 mL    Vasopressin: 47.4 mL    Vasopressin: 40.8 mL  Total IN: 1060.7 mL    OUT:    Drain (mL): 135 mL    Voided (mL): 1465 mL  Total OUT: 1600 mL    Total NET: -539.3 mL      01 Mar 2022 07:01  -  01 Mar 2022 21:51  --------------------------------------------------------  IN:    Albumin 5%  - 250 mL: 750 mL    Enteral Tube Flush: 110 mL    IV PiggyBack: 500 mL    IV PiggyBack: 349.6 mL    Jevity 1.2: 120 mL    Vasopressin: 21.6 mL    Vasopressin: 7.2 mL  Total IN: 1858.4 mL    OUT:    Drain (mL): 30 mL    Voided (mL): 715 mL  Total OUT: 745 mL    Total NET: 1113.4 mL        ============================ LABS =========================                        8.4    21.24 )-----------( 74       ( 01 Mar 2022 17:38 )             27.4         136  |  105  |  17  ----------------------------<  141<H>  3.8   |  17<L>  |  0.97    Ca    9.2      01 Mar 2022 17:38  Phos  2.3       Mg     1.9         TPro  7.3  /  Alb  2.9<L>  /  TBili  0.4  /  DBili  x   /  AST  29  /  ALT  30  /  AlkPhos  116      Troponin T, High Sensitivity Result: 24 ng/L (22 @ 23:13)              LIVER FUNCTIONS - ( 01 Mar 2022 17:38 )  Alb: 2.9 g/dL / Pro: 7.3 g/dL / ALK PHOS: 116 U/L / ALT: 30 U/L / AST: 29 U/L / GGT: x           PT/INR - ( 01 Mar 2022 00:08 )   PT: 16.0 sec;   INR: 1.33 ratio         PTT - ( 01 Mar 2022 00:08 )  PTT:25.0 sec  ABG - ( 01 Mar 2022 10:43 )  pH, Arterial: 7.40  pH, Blood: x     /  pCO2: 32    /  pO2: 97    / HCO3: 20    / Base Excess: -4.4  /  SaO2: 99.2              Lactate, Blood: 1.0 mmol/L (22 @ 17:38)  Blood Gas Arterial, Lactate: 1.1 mmol/L (22 @ 10:43)  Blood Gas Arterial, Lactate: 0.9 mmol/L (22 @ 06:12)  Blood Gas Arterial, Lactate: 0.8 mmol/L (22 @ 00:05)  Blood Gas Arterial, Lactate: 0.8 mmol/L (22 @ 20:55)  Blood Gas Arterial, Lactate: 0.9 mmol/L (22 @ 15:44)  Blood Gas Arterial, Lactate: 0.9 mmol/L (22 @ 10:22)  Blood Gas Venous - Lactate: 1.2 mmol/L (22 @ 10:22)  Blood Gas Arterial, Lactate: 1.2 mmol/L (22 @ 04:51)  Blood Gas Arterial, Lactate: 1.1 mmol/L (22 @ 02:15)  Blood Gas Venous - Lactate: 1.1 mmol/L (22 @ 01:58)  Blood Gas Arterial, Lactate: 1.3 mmol/L (22 @ 00:49)  Blood Gas Arterial, Lactate: 1.1 mmol/L (22 @ 22:18)    Urinalysis Basic - ( 01 Mar 2022 00:42 )    Color: Yellow / Appearance: Clear / S.022 / pH: x  Gluc: x / Ketone: Small  / Bili: Negative / Urobili: Negative   Blood: x / Protein: 30 mg/dL / Nitrite: Negative   Leuk Esterase: Moderate / RBC: 199 /hpf / WBC 22 /HPF   Sq Epi: x / Non Sq Epi: 3 /hpf / Bacteria: Negative      ======================Micro/Rad/Cardio=================  Telemtry: Reviewed   EKG: Reviewed  CXR: Reviewed  Culture: Reviewed   Echo: Transthoracic Echocardiogram:   Patient name: RICKY HAMPTON  YOB: 1955   Age: 66 (M)   MR#: [id number]  Study Date: 2022  Location: [address] [place], [place] [id number]: Karla Sellers RDCS  Study quality: Technically difficult  Referring Physician: Peace Rodriguez MD  Blood Pressure: 78/66 mmHg  Height: 183 cm  Weight: 54 kg  BSA: 1.7 m2  ------------------------------------------------------------------------  PROCEDURE: Transthoracic echocardiogram with 2-D, M-Mode  and complete spectral and color flow Doppler.  INDICATION: Acute and subacute infective endocarditis  (I33.0)  ------------------------------------------------------------------------  Dimensions:    Normal Values:  LA:     2.8    2.0 - 4.0 cm  Ao:     3.7    2.0 - 3.8 cm  SEPTUM: 0.7    0.6 - 1.2 cm  PWT:    0.9    0.6 - 1.1 cm  LVIDd:  4.6    3.0 - 5.6 cm  LVIDs:  3.2    1.8 - 4.0 cm  Derived variables:  LVMI: 69 g/m2  RWT: 0.39  EF (Visual Estimate):  %  ------------------------------------------------------------------------  Observations:  MitralValve: Normal mitral valve.  Aortic Valve/Aorta: Calcified aortic valve which remains  closed on all observed beats.  Mild, continuous aortic regurgitation.  Normal aortic root size.  Left Atrium: Normal left atrium.  Left Ventricle: Normal left ventricular internal dimensions  and wall thicknesses.  Left ventricular assist device (LVAD) noted in the apex  with laminar flow.  Right Heart: Normal right atrium.  Right ventricular enlargement with decreased right  ventricular systolic function.  An annuloplasty ring is seen in the tricuspid position.  Mild tricuspid regurgitation.  Pericardium/Pleura: Normal pericardium with no pericardial  effusion.  Hemodynamic: No evidence of pulmonary hypertension.  ------------------------------------------------------------------------  Conclusions:  Heartmate 2 at  9200/min.  Normal left ventricular internal dimensions and wall  thicknesses.  Left ventricular assist device (LVAD) noted in the apex  with laminar flow.  Calcified aortic valve which remains closed on all observed  beats. Mild, continuous aortic regurgitation.  Right ventricular enlargement with decreased right  ventricular systolic function.  An annuloplasty ring is seen in the tricuspid position.  Mild tricuspid regurgitation.  ------------------------------------------------------------------------  Confirmed on  2022 13:57:46 by Logan Baeza MD, FASE  ------------------------------------------------------------------------ (22 @ 09:42)      ======================================================  PAST MEDICAL & SURGICAL HISTORY:  CHF (congestive heart failure)    CAD (coronary artery disease)    Depression    Pleural effusion    History of 2019 novel coronavirus disease (COVID-19)  2020    Hemorrhoids    Bleeding hemorrhoids    Peripheral arterial disease    Claudication    BPH with urinary obstruction    ACC/AHA stage D systolic heart failure    Anticoagulation goal of INR 2.0 to 2.5    Falls    Clavicle fracture    CKD (chronic kidney disease), stage III    Iron deficiency anemia    H/O epistaxis    Vertigo    GI bleed    S/P TVR (tricuspid valve repair)    S/P ventricular assist device    S/P endoscopy    H/O tracheostomy      ====================ASSESSMENT ==============  ACC/AHA stage D systolic heart failure s/p LVAD  Ventricular Tachycardia  Hypercarbia  Hx trach w/ stoma  Anemia  RASHAWN  GI bleed in past req multiple transfusions  Hx Hyperthyroidism   Stress hyperglycemia   Septic shock  AMS  Depression    Plan:  ====================== NEUROLOGY=====================  AMS  - per nursing home staff pt has been altered for one week  - pt AAOx0, not verbally responding to staff  - pt following commands  - continue to monitor neuro status  - CTH at OSH negative for acute pathology   -  CTH: Mild to mod white matter microvascular ischemic disease. No acute pathology   - Will follow up with radiology to confirm but likely unable to get Brain MRI  LVAD  - consider neuro consult in AM if continues with AMS with appropriate abx coverage for sepsis.     Depression  - Mirtazapine and Sertraline for depression     Pain control   - gabapentin for pain     gabapentin Solution 100 milliGRAM(s) Oral three times a day  mirtazapine 7.5 milliGRAM(s) Oral at bedtime  sertraline 100 milliGRAM(s) Oral daily    ==================== RESPIRATORY======================  Hypercarbia  - Pt hypercarbic at OSH, placed on BiPAP   - Weaned BiPAP after admission to NC with ABG stable. Mid-day on , patient hypoventilating so place back on BiPAP but place back to NC 3L this AM  - continue to monitor respiratory status   - wean NC as tolerated with goal SpO2 >94%    Hx trach w/ stoma  - Pt trach x2 cycles in past  - most recently in setting of COVID PNA in 2022  - Pt decannulated after admission  - purulent drainage in stoma site, cultured pending results  - ENT states no sign of infection at Stoma site- Plan for possible TE fistula closure when pt is stable as per ENT    ====================CARDIOVASCULAR==================  ACC/AHA stage D systolic heart failure s/p LVAD  - pt appeared hypovolemic on admission  - c/w Vaso gtt for HD instability, wean as tolerated with goal SBP >100  - s/p 1L NS, 250 albumin x1, 2L LR overnight. Fluid responsive- able to wean pressors with fluid overnight. Will give additional fluid prn with goal CVP 6-8. Will give additional 1L LR now.   - Hgb 7, s/p 1U pRBC overnight with incomplete response. continue to monitor    - central sat sent from intro 81.8 with negative lactate x4  - LVAD coordinator and HF attending contacted   - Pressor support with Midodrine PO    Ventricular Tachycardia  - pt has history of VT on previous admissions, takes home mexiletine  - NSVT seen on tele in CICU  - lido gtt d/c yesterday without significant ectopy noted on tele. Restarted on home mexiletine as now with NGT    mexiletine 150 milliGRAM(s) Oral every 8 hours  midodrine 10 milliGRAM(s) Oral every 8 hours  vasopressin Infusion 0.02 Unit(s)/Min (1.2 mL/Hr) IV Continuous <Continuous>  ===================HEMATOLOGIC/ONC ===================  Anemia  - Hgb on admission 7.4 with subsequent drop to 6.6  - s/p 2u PRBC . Now h/h has been stable >24hrs. Will check cbc q8, if remains stable can be checked q12   - of note, pt has hx of GI bleeds  - no blood noted in BM, continue to monitor   - transfuse as needed if hgb <7    ===================== RENAL =========================  RASHAWN, now resolved   - continue to monitor SCr, BUN, lytes, and I&Os  - replete lytes prn with goal K 4-4.5 and mg >2      ==================== GASTROINTESTINAL===================  GI bleed in past req multiple transfusions   - no overt bleeding noted on this admission   - monitor BM for signs of blood. h/h stable for past 24 hrs without any blood noted in BM  - c/w PPI  - Now with NGT, consider starting TF today  - Reglan for gut motility   - Bowel regimen with Senna     cholecalciferol 1000 Unit(s) Oral daily  metoclopramide 10 milliGRAM(s) Oral four times a day  multivitamin 1 Tablet(s) Oral daily  GI prophylaxis, pantoprazole  Injectable 40 milliGRAM(s) IV Push daily  senna 2 Tablet(s) Oral at bedtime    =======================    ENDOCRINE  =====================  Hx Hyperthyroidism   - continue home methimazole   - pending TSH     Stress hyperglycemia   - Continue glucose control with ADMELOG sliding scale    dextrose 40% Gel 15 Gram(s) Oral once  dextrose 50% Injectable 25 Gram(s) IV Push once  glucagon  Injectable 1 milliGRAM(s) IntraMuscular once  insulin lispro (ADMELOG) corrective regimen sliding scale   SubCutaneous every 6 hours  methimazole 10 milliGRAM(s) Oral daily    ========================INFECTIOUS DISEASE================  Septic shock  - pt arrived tachycardic 120s, hypotensive MAPs 50-60, AMS  - WBC 27  - pt got zosyn and vancomycin x1 at OSH. Will switch zosyn to cefepime and continue with vanc 750mg bid. can likely d/c vanco but will confirm with ID  - f/u infectious workup. BCX  gram neg rods x4 bottles-- growing serratia. pending sensitives   - f/u stoma culture sent   - CT C/A/P at OSH: cholecystostomy in place, patchy opacities vs. atelectasis as per written transfer report (CD and official report not sent with transfer)   - OSH BCX prelim gram neg rods in anaerobic bottle. continue to follow [facility] (BCX taken prior to abx initiation)  - ID following       Patient requires continuous monitoring with bedside rhythm monitoring, pulse ox monitoring, and intermittent blood gas analysis. Care plan discussed with ICU care team. Patient remained critical and at risk for life threatening decompensation.  Patient seen, examined and plan discussed with CCU team during rounds.     I have personally provided ____ minutes of critical care time excluding time spent on separate procedures, in addition to initial critical care time provided by the CICU Attending, Dr. Jensen.     By signing my name below, I, Raquel Barone, attest that this documentation has been prepared under the direction and in the presence of Leann Cronin  Electronically signed: Cesia Schmitt, 22 @ 21:51    I, Leann Cronin, personally performed the services described in this documentation. all medical record entries made by the luisibmel were at my direction and inpresence. I have reviewed the chart and agree that the record reflects my personal performance and is accurate and complete  Electronically signed: Leann Cronin

## 2022-03-01 NOTE — PROGRESS NOTE ADULT - ASSESSMENT
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 s/p HM2 LVAD in 9/2017 at  (due to severe PAD) with TV ring, multiple GI bleeds, thus maintained off AC, CKD 3prior COVID-19 infection in 4/2020, recurrent syncope post LVAD s/p ILR, s/p prolonged complex hospitalization from 6/14-11/16/2021 initially admitted with abdominal pain complicated by GIB, respiratory failure s/p trach, multiple infections, s/p cholecystotomy tube and SMA stent 10/2021, ultimately discharged to [facility] rehab and then returned to [facility] for trach decannulation 12/2. The patient had a recent admission with COVID 19 reinfection and distributive shock. That admission he had blood culture positive for serratia marcescens (discharged on levaquin).  He was treated with MAB, remdesivir, and steroids. He had recurrent VT and was started mexiletine. Now coming in from [facility] ED with leukocytosis and AMS was treated for UTI. Initially required bipap but was weaned off here. Labs concerning here for WC 26, hemoglobin of 7s then 6.7, BNP 5K, creatinine of 1.3. His maps were initially in the 50s. Physical exam showing pus from stoma, sat of central access of 81.8, and tachycardia are all concerning for septic shock picture. He is status 1L fluids, 1uPRBCs, and placed on vaso and lidocaine     Upon admission HM2 4.8 LPM, 9200 RPM     Found to have recurrent serratia bacteremia, now on broad spectrum antibiotics with decreasing vasopressor requirements although remains on low dose. He's afebrile. Relatively stable leukocytosis. He has been weaned off bipap and is saturating well on 3L NC. He is off lidocaine, now being resumed on mexiletine. No further NSVT. Mental status improving. MAPs slightly above target of 70-80. CVP low at 3 today.

## 2022-03-01 NOTE — CONSULT NOTE ADULT - SUBJECTIVE AND OBJECTIVE BOX
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 s/p HM2 LVAD in 2017 at  (due to severe PAD) with TV ring, multiple GI bleeds, thus maintained off AC, CKD 3prior COVID-19 infection in 2020, recurrent syncope post LVAD s/p ILR, s/p prolonged complex hospitalization from -2021 initially admitted with abdominal pain complicated by GIB, respiratory failure s/p trach, multiple infections, s/p cholecystotomy tube and SMA stent 10/2021, ultimately discharged to [facility] rehab and then returned to [facility] for trach decannulation , readmitted in 2022 with recurrent COVID-19 infection, initially in distributive shock. Blood cultures were also positive for serratia marcescens which he has had in the past.     Pt was transferred to [facility] CICU from [facility]. He was sent to their ED from a rehab center due to AMS and tachycardia. At [facility], they noted a WBC greater than 20, tachycardia and hypercarbia on ABG. Pt was placed on BiPAP and transferred to [facility] CICU for concern for sepsis vs septic shock.      (2022 22:20)    Collateral info was gathered from the patient's sister (HCP) that indicated, in the [place], the patient also had a fall. However, CT Head dated  is negative for acute events.     3/1 The patient was lethargic, non verbal, and not able to f/u commands. He was having mild respiratory distress.     PERTINENT PM/SXH:   No pertinent past medical history    CHF (congestive heart failure)    CAD (coronary artery disease)    Depression    Pleural effusion    History of  novel coronavirus disease (COVID-19)    Hemorrhoids    Bleeding hemorrhoids    Peripheral arterial disease    Claudication    BPH with urinary obstruction    ACC/AHA stage D systolic heart failure    Anticoagulation goal of INR 2.0 to 2.5    Falls    Clavicle fracture    CKD (chronic kidney disease), stage III    Iron deficiency anemia    H/O epistaxis    Vertigo    GI bleed      No significant past surgical history    S/P TVR (tricuspid valve repair)    S/P ventricular assist device    S/P endoscopy    H/O tracheostomy      FAMILY HISTORY:    ITEMS NOT CHECKED ARE NOT PRESENT    SOCIAL HISTORY:   Significant other/partner[ ]  Children[x ]  Anabaptism/Spirituality:  Substance hx:  [ ]   Tobacco hx:  [ ]   Alcohol hx: [ ]   Home Opioid hx:  [ ] I-Stop Reference No:  Living Situation: [ ]Home  [ ]Long term care  [ ]Rehab [ ]Other    ADVANCE DIRECTIVES:    DNR  MOLST  [ ]  Living Will  [ ]   DECISION MAKER(s):  [x ] Health Care Proxy(s)  [ ] Surrogate(s)  [ ] Guardian           Name(s): Phone Number(s): Cristina Rudolph [phone]    BASELINE (I)ADL(s) (prior to admission):  [place]: [ ]Total  [x ] Moderate [ ]Dependent    Allergies    Amiodarone Hydrochloride (Other (Severe))    Intolerances    MEDICATIONS  (STANDING):  cefepime   IVPB 2000 milliGRAM(s) IV Intermittent every 12 hours  chlorhexidine 2% Cloths 1 Application(s) Topical <User Schedule>  cholecalciferol 1000 Unit(s) Oral daily  dextrose 40% Gel 15 Gram(s) Oral once  dextrose 50% Injectable 25 Gram(s) IV Push once  gabapentin Solution 100 milliGRAM(s) Oral three times a day  glucagon  Injectable 1 milliGRAM(s) IntraMuscular once  insulin lispro (ADMELOG) corrective regimen sliding scale   SubCutaneous every 6 hours  methimazole 10 milliGRAM(s) Oral daily  metoclopramide 10 milliGRAM(s) Oral four times a day  mexiletine 150 milliGRAM(s) Oral every 8 hours  mirtazapine 7.5 milliGRAM(s) Oral at bedtime  multivitamin 1 Tablet(s) Oral daily  pantoprazole  Injectable 40 milliGRAM(s) IV Push daily  senna 2 Tablet(s) Oral at bedtime  sertraline 100 milliGRAM(s) Oral daily  vancomycin  IVPB 750 milliGRAM(s) IV Intermittent every 24 hours  vasopressin Infusion 0.06 Unit(s)/Min (3.6 mL/Hr) IV Continuous <Continuous>    MEDICATIONS  (PRN):    PRESENT SYMPTOMS: [x ]Unable to obtain due to poor mentation   Source if other than patient:  [ ]Family   [ ]Team     Pain: [ ]yes [x ]no  QOL impact -   Location -                    Aggravating factors -  Quality -  Radiation -  Timing-  Severity (0-10 scale):  Minimal acceptable level (0-10 scale):     CPOT:    https://www.[facility].org/getattachment/jdf71z88-2c5i-8v8s-9g8t-2522d4511n5v/Critical-Care-Pain-Observation-Tool-(CPOT)      PAIN AD Score:     http://geriatrictoolkit.[facility]/cog/painad.pdf (press ctrl +  left click to view)  RDOS scale (https://pubmed.ncbi.nlm.nih.gov/80699553/, https://www.[employer]/doi/10.1089/[employer].nlm..0229?url_ver=Z39.88-&rfr_id=mala:rid:[employer]ref.org&rfr_dat=cr_pub%20%200pubmed)    A score of 0 to 2 signifies little or no respiratory distress, 3 signifies mild distress, scores 4 to 6 indicate moderate distress, and scores greater than 7 signify severe distress  RDOS score: 2  Dyspnea:                           [ ]Mild [ ]Moderate [ ]Severe  Anxiety:                             [ ]Mild [ ]Moderate [ ]Severe  Fatigue:                             [ ]Mild [ ]Moderate [ ]Severe  Nausea:                             [ ]Mild [ ]Moderate [ ]Severe  Loss of appetite:              [ ]Mild [ ]Moderate [ ]Severe  Constipation:                    [ ]Mild [ ]Moderate [ ]Severe    Other Symptoms:  [ ]All other review of systems negative     Palliative Performance Status Version 2: 20        %    http://npcrc.org/files/news/palliative_performance_scale_ppsv2.pdf  PHYSICAL EXAM:  Vital Signs Last 24 Hrs  T(C): 37.2 (01 Mar 2022 16:00), Max: 38.4 (01 Mar 2022 09:00)  T(F): 99 (01 Mar 2022 16:00), Max: 101.1 (01 Mar 2022 09:00)  HR: 109 (01 Mar 2022 16:00) (67 - 113)  BP: --  BP(mean): 86 (2022 20:00) (86 - 86)  RR: 29 (01 Mar 2022 16:00) (5 - 40)  SpO2: 96% (01 Mar 2022 16:00) (96% - 100%) I&O's Summary    2022 07:01  -  01 Mar 2022 07:00  --------------------------------------------------------  IN: 1060.7 mL / OUT: 1600 mL / NET: -539.3 mL    01 Mar 2022 07:01  -  01 Mar 2022 16:25  --------------------------------------------------------  IN: 666.6 mL / OUT: 395 mL / NET: 271.6 mL      GENERAL:  [ ]Alert  [ ]Oriented x   [x ]Lethargic  [ ]Cachexia  [ ]Unarousable  [ ]Verbal  [x ]Non-Verbal  Behavioral:   [ ] Anxiety  [ ] Delirium [ ] Agitation [x ] Other: Encephalopathic   HEENT:  [ ]Normal   [ x]Dry mouth   [ ]ET Tube/Trach  [ ]Oral lesions  PULMONARY:   [ ]Clear [x ]Tachypnea  [ ]Audible excessive secretions   [ ]Rhonchi        [ ]Right [ ]Left [ ]Bilateral  [ ]Crackles        [ ]Right [ ]Left [ ]Bilateral  [ ]Wheezing     [ ]Right [ ]Left [ ]Bilateral  [x ]Diminished breath sounds [ ]right [ ]left [x ]bilateral  CARDIOVASCULAR:    [x ]Regular [ ]Irregular [ ]Tachy  [ ]Jose [ ]Murmur [ ]Other  [x] LVAD hum  GASTROINTESTINAL:  [ ]Soft  [ ]Distended   [ ]+BS  [ ]Non tender [ ]Tender  [ ]PEG [ ]OGT/ NGT  Last BM: 3/1  GENITOURINARY:  [ ]Normal [x ] Incontinent   [ ]Oliguria/Anuria   [ ]Landrum  MUSCULOSKELETAL:   [ ]Normal   [x ]Weakness  [ ]Bed/Wheelchair bound [ ]Edema  NEUROLOGIC:   [ ]No focal deficits  [ x]Cognitive impairment  [ ]Dysphagia [ ]Dysarthria [ ]Paresis [ ]Other   SKIN:   [ ]Normal    [ ]Rash  [ ]Pressure ulcer(s)       Present on admission [ ]y [ ]n  [x] BL Heel and R Buttock DTI   CRITICAL CARE:  [ ] Shock Present  [ ]Septic [ ]Cardiogenic [ ]Neurologic [ ]Hypovolemic  [ ]  Vasopressors [ ]  Inotropes   [ ]Respiratory failure present [ ]Mechanical ventilation [ ]Non-invasive ventilatory support [ ]High flow    [ ]Acute  [ ]Chronic [ ]Hypoxic  [ ]Hypercarbic [ ]Other  [ ]Other organ failure     LABS:                        8.3    24.36 )-----------( 80       ( 01 Mar 2022 10:49 )             27.0   03-    140  |  108  |  20  ----------------------------<  166<H>  4.0   |  20<L>  |  1.01    Ca    9.1      01 Mar 2022 00:08  Phos  2.5     03  Mg     1.9         TPro  7.3  /  Alb  2.7<L>  /  TBili  0.5  /  DBili  x   /  AST  58<H>  /  ALT  40  /  AlkPhos  133<H>  03  PT/INR - ( 01 Mar 2022 00:08 )   PT: 16.0 sec;   INR: 1.33 ratio         PTT - ( 01 Mar 2022 00:08 )  PTT:25.0 sec    Urinalysis Basic - ( 01 Mar 2022 00:42 )    Color: Yellow / Appearance: Clear / S.022 / pH: x  Gluc: x / Ketone: Small  / Bili: Negative / Urobili: Negative   Blood: x / Protein: 30 mg/dL / Nitrite: Negative   Leuk Esterase: Moderate / RBC: 199 /hpf / WBC 22 /HPF   Sq Epi: x / Non Sq Epi: 3 /hpf / Bacteria: Negative      RADIOLOGY & ADDITIONAL STUDIES:  Reviewed   PROTEIN CALORIE MALNUTRITION PRESENT: [ ]mild [ ]moderate [ ]severe [ ]underweight [ ]morbid obesity  https://www.andeal.org/vault/2440/web/files/ONC/Table_Clinical%20Characteristics%20to%20Document%20Malnutrition-White%20JV%20et%20al%2020.pdf    Height (cm): 177.8 (22 @ 21:20), 182.9 (21 @ 04:30), 165.1 (21 @ 13:55)  Weight (kg): 48.7 (22 @ 21:20), 52.6 (22 @ 12:50), 53.6 (21 @ 13:55)  BMI (kg/m2): 15.4 (22 @ 21:20), 15.7 (22 @ 12:50), 16 (21 @ 04:30)    [ ]PPSV2 < or = to 30% [ ]significant weight loss  [ ]poor nutritional intake  [ ]anasarca      [ ]Artificial Nutrition      REFERRALS:   [ ]Chaplaincy  [ ]Hospice  [ ]Child Life  [ ]Social Work  [ ]Case management [ ]Holistic Therapy     Goals of Care Document:

## 2022-03-01 NOTE — PROGRESS NOTE ADULT - SUBJECTIVE AND OBJECTIVE BOX
Patient seen and examined at bedside.    Overnight Events:     No AEON     Tele showed earlier in the day yesterday SVT likely Aflutter HR in 130-140, went back to sinus rhythm in 70-90     Patient alert but non-conversive, not following commands       Current Meds:  acetaminophen   IVPB .. 750 milliGRAM(s) IV Intermittent once  cefepime   IVPB 2000 milliGRAM(s) IV Intermittent every 12 hours  chlorhexidine 2% Cloths 1 Application(s) Topical <User Schedule>  cholecalciferol 1000 Unit(s) Oral daily  gabapentin Solution 100 milliGRAM(s) Oral three times a day  methimazole 10 milliGRAM(s) Oral daily  metoclopramide 10 milliGRAM(s) Oral four times a day  mirtazapine 7.5 milliGRAM(s) Oral at bedtime  multivitamin 1 Tablet(s) Oral daily  pantoprazole  Injectable 40 milliGRAM(s) IV Push daily  senna 2 Tablet(s) Oral at bedtime  sertraline 100 milliGRAM(s) Oral daily  vancomycin  IVPB 750 milliGRAM(s) IV Intermittent every 24 hours  vasopressin Infusion 0.06 Unit(s)/Min IV Continuous <Continuous>      Vitals:  T(F): 100 (03-01), Max: 100 (03-01)  HR: 98 (03-01) (67 - 138)  BP: --  RR: 37 (03-01)  SpO2: 100% (03-01)  I&O's Summary    28 Feb 2022 07:01  -  01 Mar 2022 07:00  --------------------------------------------------------  IN: 1060.7 mL / OUT: 1600 mL / NET: -539.3 mL    01 Mar 2022 07:01  -  01 Mar 2022 09:24  --------------------------------------------------------  IN: 254.8 mL / OUT: 105 mL / NET: 149.8 mL        Physical Exam:  Appearance: No acute distress   Eyes:EOMI, pink conjunctiva  HEENT: Normal oral mucosa  Cardiovascular: LVAD hum   Respiratory: Clear to auscultation bilaterally  Gastrointestinal: soft, non-tender, RUQ dawn tube in place   Musculoskeletal: No clubbing; no joint deformity   Neurologic: Non-focal  Psychiatry: Alert but oriented x0, non conversive, not following commands   Skin: No rashes, ecchymoses, or cyanosis                          8.4    23.62 )-----------( 83       ( 01 Mar 2022 06:16 )             26.9     03-01    140  |  108  |  20  ----------------------------<  166<H>  4.0   |  20<L>  |  1.01    Ca    9.1      01 Mar 2022 00:08  Phos  2.5     03-01  Mg     1.9     03-01    TPro  7.3  /  Alb  2.7<L>  /  TBili  0.5  /  DBili  x   /  AST  58<H>  /  ALT  40  /  AlkPhos  133<H>  03-01    PT/INR - ( 01 Mar 2022 00:08 )   PT: 16.0 sec;   INR: 1.33 ratio         PTT - ( 01 Mar 2022 00:08 )  PTT:25.0 sec  CARDIAC MARKERS ( 27 Feb 2022 23:13 )  24 ng/L / x     / x     / x     / x     / x          Serum Pro-Brain Natriuretic Peptide: 5486 pg/mL (02-27 @ 23:13)          New ECG(s): Personally reviewed    Echo:    Stress Testing:     Cath:    Imaging:    Interpretation of Telemetry:

## 2022-03-01 NOTE — CONSULT NOTE ADULT - PROBLEM SELECTOR RECOMMENDATION 9
As d/w ID (Dr. Prater) sources of infection are probably the patient LVAD +/- Splenic abscess that due to the patient's advanced illness, hemodynamic instability, and frail state will not be eligible for source control (LVAD can not be removed and, as d/w Dr. Jensen, unlikely to be stable for IR drainage of splenic abscess). Hence, without source control, the patient's infections will continue to reoccur. However,  these recurrent infection are causing an increasing burden on the patient,  creating recurrent admissions, affecting his mental status, limiting his independency, and increasing his chances of mortality. I called the patient's sister and d/w her and with her  about Dr. Prater's and Mikey's inputs. Cristina wanted to see if there was anyway for addressing the abscess or try to get the patient a new heart. I indicated, at this point, the patient was not a candidate for transplant, nor the was a candidate for VAD replacement, or IR drainage of his splenic abscess. Cristina indicated she was a person of aysha and that she wanted Drs to continue to try to treat Vic so his condition was to improve. I indicated that everything was being done; however, that without source control, option were limited. Cristina agreed with a FM on Thursday. Tomorrow, I will f/u with her so a time for Thursday's meeting is set up.

## 2022-03-01 NOTE — PROGRESS NOTE ADULT - SUBJECTIVE AND OBJECTIVE BOX
RICKY JOINT  MRN#: [id number]  Subjective: pulmonary progress note  : acute hypercapnic respiratory failure . Septic shock , service rendered on 2002   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 s/p HM2 LVAD in 2017 at  (due to severe PAD) with TV ring, multiple GI bleeds, thus maintained off AC, CKD 3prior COVID-19 infection in 2020, recurrent syncope post LVAD s/p ILR, s/p prolonged complex hospitalization from -2021 initially admitted with abdominal pain complicated by GIB, respiratory failure s/p trach, multiple infections, s/p cholecystotomy tube and SMA stent 10/2021, ultimately discharged to [facility] rehab and then returned to [facility] for trach decannulation , readmitted in 2022 with recurrent COVID-19 infection, initially in distributive shock. Blood cultures were also positive for serratia marcescens which he has had in the past.     Pt was transferred to [facility] CICU from [facility]. He was sent to their ED from a rehab center due to AMS and tachycardia. At [facility], they noted a WBC greater than 20, tachycardia and hypercarbia on ABG. Pt was placed on BiPAP and transferred to [facility] CICU for concern for sepsis vs septic shock. patient was seen and examined on 2002     (2022 22:20)    PAST MEDICAL & SURGICAL HISTORY:  CHF (congestive heart failure)    CAD (coronary artery disease)  Depression    Pleural effusion    History of 2019 novel coronavirus disease (COVID-19)  2020    Hemorrhoids    Bleeding hemorrhoids    Peripheral arterial disease    Claudication    BPH with urinary obstruction    ACC/AHA stage D systolic heart failure    Anticoagulation goal of INR 2.0 to 2.5    Falls    Clavicle fracture    CKD (chronic kidney disease), stage III    Iron deficiency anemia    H/O epistaxis    Vertigo    GI bleed    S/P TVR (tricuspid valve repair)    S/P ventricular assist device    S/P endoscopy    H/O tracheostomy        FAMILY HISTORY:    Social History:    Marital Status:  X    (   ) Single    (   )    (  )   Occupation: Retired   Lives with: (  ) alone  (  ) children  X spouse   (  ) parents  (  ) other    Substance Use (street drugs): X never used  (  ) other:  Tobacco Usage: X never smoked   (   ) former smoker   (   ) current smoker  (     ) pack year  (    ) last cigarette date  Alcohol Usage: Social         OBJECTIVE:  ICU Vital Signs Last 24 Hrs  T(C): 37.2 (01 Mar 2022 16:00), Max: 38.4 (01 Mar 2022 09:00)  T(F): 99 (01 Mar 2022 16:00), Max: 101.1 (01 Mar 2022 09:00)  HR: 109 (01 Mar 2022 18:00) (67 - 124)  BP: --  BP(mean): 86 (2022 20:00) (86 - 86)  ABP: 89/74 (01 Mar 2022 18:00) (75/63 - 160/85)  ABP(mean): 81 (01 Mar 2022 18:00) (68 - 139)  RR: 38 (01 Mar 2022 18:00) (5 - 40)  SpO2: 99% (01 Mar 2022 18:00) (94% - 100%)       @ 07: @ 07:00  --------------------------------------------------------  IN: 1060.7 mL / OUT: 1600 mL / NET: -539.3 mL     @ 07: @ 18:22  --------------------------------------------------------  IN: 677.8 mL / OUT: 455 mL / NET: 222.8 mL    PHYSICAL EXAM :Daily Height in cm: 177.8 (2022 21:20)  Elderly frail mail weaned off  positive pressure non invasive ventilator I/E ratio is14/5 on 4 liter nasal cannula   Daily Weight in k.7 (2022 21:20)  HEENT:     + NCAT  + EOMI  - Conjuctival edema   - Icterus   - Thrush   - ETT  - NGT/OGT  Neck:         + FROM RT IJ  JVD     - Nodes     - Masses    + Mid-line trachea   - Tracheostomy  Chest:           mild kyphosis   Lungs:          + CTA  + Rhonchi  + Rales    - Wheezing  + Decreased BS   - Dullness R L  Cardiac:       + S1 + S2    + RRR   - Irregular   - S3  - S4    - Murmurs   - Rub   - Hamman’s sign   Abdomen:    + BS     + Soft    + Non-tender  - Distended  - Organomegaly  - PEG Cholecystostomy tube in place   Extremities:   - Cyanosis U/L   - Clubbing  U/L  - LE/UE Edema   + Capillary refill    + Pulses   Neuro:        + Awake   +  Alert   - Confused   - Lethargic   - Sedated   - Generalized Weakness  Skin:        - Rashes    - Erythema   + Normal incisions   + IV sites intact  -      HOSPITAL MEDICATIONS: All mediciations reviewed and analyzed  MEDICATIONS  (STANDING):  albumin human  5% IVPB 250 milliLiter(s) IV Intermittent once  cefepime   IVPB 2000 milliGRAM(s) IV Intermittent every 12 hours  chlorhexidine 2% Cloths 1 Application(s) Topical <User Schedule>  cholecalciferol 1000 Unit(s) Oral daily  dextrose 40% Gel 15 Gram(s) Oral once  dextrose 50% Injectable 25 Gram(s) IV Push once  gabapentin Solution 100 milliGRAM(s) Oral three times a day  glucagon  Injectable 1 milliGRAM(s) IntraMuscular once  insulin lispro (ADMELOG) corrective regimen sliding scale   SubCutaneous every 6 hours  magnesium sulfate  IVPB 1 Gram(s) IV Intermittent once  methimazole 10 milliGRAM(s) Oral daily  metoclopramide 10 milliGRAM(s) Oral four times a day  mexiletine 150 milliGRAM(s) Oral every 8 hours  mirtazapine 7.5 milliGRAM(s) Oral at bedtime  multivitamin 1 Tablet(s) Oral daily  pantoprazole  Injectable 40 milliGRAM(s) IV Push daily  potassium phosphate IVPB 15 milliMole(s) IV Intermittent once  senna 2 Tablet(s) Oral at bedtime  sertraline 100 milliGRAM(s) Oral daily  vancomycin  IVPB 750 milliGRAM(s) IV Intermittent every 24 hours  vasopressin Infusion 0.02 Unit(s)/Min (1.2 mL/Hr) IV Continuous <Continuous>    MEDICATIONS  (PRN):    LABS: All Lab data reviewed and analyzed                        8.4    21.24 )-----------( 74       ( 01 Mar 2022 17:38 )             27.4    -    136  |  105  |  17  ----------------------------<  141<H>  3.8   |  17<L>  |  0.97    Ca    9.2      01 Mar 2022 17:38  Phos  2.3     03-  Mg     1.9     -    TPro  7.3  /  Alb  2.9<L>  /  TBili  0.4  /  DBili  x   /  AST  29  /  ALT  30  /  AlkPhos  116  03-    PT/INR - ( 01 Mar 2022 00:08 )   PT: 16.0 sec;   INR: 1.33 ratio         PTT - ( 01 Mar 2022 00:08 )  PTT:25.0 sec LIVER FUNCTIONS - ( 01 Mar 2022 17:38 )  Alb: 2.9 g/dL / Pro: 7.3 g/dL / ALK PHOS: 116 U/L / ALT: 30 U/L / AST: 29 U/L / GGT: x           RADIOLOGY: - Reviewed and analyzed

## 2022-03-02 NOTE — DIETITIAN INITIAL EVALUATION ADULT. - ORAL INTAKE PTA/DIET HISTORY
Pt well known to this RD upon previous hospitalizations. Unable to obtain subjective information from pt at this time; pt w/ AMS. Per chart, NKFA and micronutrient supplementation PTA included magnesium oxide, cholecalciferol, multivitamin.

## 2022-03-02 NOTE — PROGRESS NOTE ADULT - PROBLEM SELECTOR PLAN 1
2/28 BCx + serratia/GNR  - IV abx per ID. currently on cefepime. vanc discontinued. received zosyn  - chest/A/P CT recommended per ID when stable  - Wean vasopressin as tolerated for MAP of 70  - please discontinue home mirtazapine and gabapentin given lethargy  - palliative care consult to readdress [facility] with plan for family meeting tomorrow at 11AM

## 2022-03-02 NOTE — CONSULT NOTE ADULT - SUBJECTIVE AND OBJECTIVE BOX
JOINT, RICKY  Male  MRN-[id number]    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 s/p HM2 LVAD in 9/2017 at  (due to severe PAD) with TV ring, multiple GI bleeds, thus maintained off AC, CKD 3prior COVID-19 infection in 4/2020, recurrent syncope post LVAD s/p ILR, s/p prolonged complex hospitalization from 6/14-11/16/2021 initially admitted with abdominal pain complicated by GIB, respiratory failure s/p trach, multiple infections, s/p cholecystotomy tube and SMA stent 10/2021, ultimately discharged to [facility] rehab and then returned to [facility] for trach decannulation 12/2, readmitted in 2/2022 with recurrent COVID-19 infection, initially in distributive shock. Blood cultures were also positive for serratia marcescens which he has had in the past.     Pt was transferred to [facility] CICU from [facility]. He was sent to their ED from a rehab center due to AMS and tachycardia. At [facility], they noted a WBC greater than 20, tachycardia and hypercarbia on ABG. Pt was placed on BiPAP and transferred to [facility] CICU for concern for sepsis vs septic shock.      (27 Feb 2022 22:20)      NIHSS:  MRS:     ROS: All negative except as mentioned in HPI    PAST MEDICAL & SURGICAL HISTORY:  CHF (congestive heart failure)    CAD (coronary artery disease)    Depression    Pleural effusion    History of 2019 novel coronavirus disease (COVID-19)  april 2020    Hemorrhoids    Bleeding hemorrhoids    Peripheral arterial disease    Claudication    BPH with urinary obstruction    ACC/AHA stage D systolic heart failure    Anticoagulation goal of INR 2.0 to 2.5    Falls    Clavicle fracture    CKD (chronic kidney disease), stage III    Iron deficiency anemia    H/O epistaxis    Vertigo    GI bleed    S/P TVR (tricuspid valve repair)    S/P ventricular assist device    S/P endoscopy    H/O tracheostomy      MEDICATIONS  (STANDING):  cefepime   IVPB 1000 milliGRAM(s) IV Intermittent every 12 hours  chlorhexidine 2% Cloths 1 Application(s) Topical <User Schedule>  cholecalciferol 1000 Unit(s) Oral daily  dextrose 40% Gel 15 Gram(s) Oral once  dextrose 50% Injectable 25 Gram(s) IV Push once  glucagon  Injectable 1 milliGRAM(s) IntraMuscular once  insulin lispro (ADMELOG) corrective regimen sliding scale   SubCutaneous every 6 hours  lidocaine   Infusion 1 mG/Min (7.5 mL/Hr) IV Continuous <Continuous>  methimazole 10 milliGRAM(s) Oral daily  metoclopramide 10 milliGRAM(s) Oral four times a day  multivitamin 1 Tablet(s) Oral daily  pantoprazole  Injectable 40 milliGRAM(s) IV Push daily  senna 2 Tablet(s) Oral at bedtime  sertraline 100 milliGRAM(s) Oral daily    MEDICATIONS  (PRN):    Allergies    Amiodarone Hydrochloride (Other (Severe))    Intolerances        VITAL SIGNS:  T(F): 97.3  HR: 87  BP: 93/70  RR: 22  SpO2: 99%    INCOMPLETE  Gen: No acute distress  Eyes: Deferred secondary to COVID-19 pandemic  CV: RRR, Palpable pulses. No swelling throughout.   MSK: BLANDON spontaneously full strength. Normal tone.   MS: Oriented x3. Recent and remote recall intact. Fluent. Follows crossed commands. Patient able to verbalize past history.   CN: VFF. EOMI. V1-V3 intact. Face symmetric. T/u midline. Should shrug intact bilaterally.   Motor: Normal tone. Full strength throughout.   Sensory: Intact to LT and PP throughout   Reflexes: 2+ throughout. Babinski absent bilaterally.   Coordination: No dysmetria on FNF or ataxia on HTS bilaterally   Gait: Normal     LABS:                          8.9    22.54 )-----------( 85       ( 02 Mar 2022 04:00 )             28.2     03-02    138  |  107  |  15  ----------------------------<  120<H>  3.8   |  20<L>  |  0.94    Ca    8.9      02 Mar 2022 03:59  Phos  2.8     03-02  Mg     2.1     03-02    TPro  7.1  /  Alb  2.7<L>  /  TBili  0.4  /  DBili  x   /  AST  21  /  ALT  22  /  AlkPhos  120  03-02    PT/INR - ( 02 Mar 2022 04:00 )   PT: 19.1 sec;   INR: 1.64 ratio         PTT - ( 02 Mar 2022 04:00 )  PTT:30.7 sec    RADIOLOGY & ADDITIONAL STUDIES:           JOINT, RICKY  Male  MRN-[id number]    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 s/p HM2 LVAD in 9/2017 at  (due to severe PAD) with TV ring, multiple GI bleeds, thus maintained off AC, CKD, prior COVID-19 infection in 4/2020, recurrent syncope post LVAD s/p ILR, s/p prolonged complex hospitalization from 6/14-11/16/2021 initially admitted with abdominal pain complicated by GIB, respiratory failure s/p trach, multiple infections, s/p cholecystotomy tube and SMA stent 10/2021, ultimately discharged to [facility] rehab and then returned to [facility] for trach decannulation 12/2, readmitted in 2/2022 with recurrent COVID-19 infection, initially in distributive shock. Blood cultures were also positive for serratia marcescens which he has had in the past.     Pt was transferred to [facility] CICU from [facility]. He was sent to their ED from a rehab center due to AMS and tachycardia. At [facility], they noted a WBC greater than 20, tachycardia and hypercarbia on ABG. Pt was placed on BiPAP and transferred to [facility] CICU for concern for sepsis vs septic shock.     Per nursing home team, patient has been altered since 2/20/22. Per nursing team, patient occasionally opens his eyes and attempted to squeeze nurse's hand morning of 3/2/22, although by afternoon eyes no longer opened and no attempt at movement was made.         NIHSS:  MRS:     ROS: unable to assess 2/2 mental status    PAST MEDICAL & SURGICAL HISTORY:  CHF (congestive heart failure)    CAD (coronary artery disease)    Depression    Pleural effusion    History of 2019 novel coronavirus disease (COVID-19)  april 2020    Hemorrhoids    Bleeding hemorrhoids    Peripheral arterial disease    Claudication    BPH with urinary obstruction    ACC/AHA stage D systolic heart failure    Anticoagulation goal of INR 2.0 to 2.5    Falls    Clavicle fracture    CKD (chronic kidney disease), stage III    Iron deficiency anemia    H/O epistaxis    Vertigo    GI bleed    S/P TVR (tricuspid valve repair)    S/P ventricular assist device    S/P endoscopy    H/O tracheostomy      MEDICATIONS  (STANDING):  cefepime   IVPB 1000 milliGRAM(s) IV Intermittent every 12 hours  chlorhexidine 2% Cloths 1 Application(s) Topical <User Schedule>  cholecalciferol 1000 Unit(s) Oral daily  dextrose 40% Gel 15 Gram(s) Oral once  dextrose 50% Injectable 25 Gram(s) IV Push once  glucagon  Injectable 1 milliGRAM(s) IntraMuscular once  insulin lispro (ADMELOG) corrective regimen sliding scale   SubCutaneous every 6 hours  lidocaine   Infusion 1 mG/Min (7.5 mL/Hr) IV Continuous <Continuous>  methimazole 10 milliGRAM(s) Oral daily  metoclopramide 10 milliGRAM(s) Oral four times a day  multivitamin 1 Tablet(s) Oral daily  pantoprazole  Injectable 40 milliGRAM(s) IV Push daily  senna 2 Tablet(s) Oral at bedtime  sertraline 100 milliGRAM(s) Oral daily    MEDICATIONS  (PRN):    Allergies    Amiodarone Hydrochloride (Other (Severe))    Intolerances        VITAL SIGNS:  T(F): 97.3  HR: 87  BP: 93/70  RR: 22  SpO2: 99%    INCOMPLETE  Gen: stuporous, grimaces to painful stimuli (b/l fingernail bed pressure)  Eyes: Deferred secondary to COVID-19 pandemic; crusty  CV: LVAD in place  MSK: normal tone, decreased bulk. Evidence of prior L knee surgery   CN: pupillary reflex intact b/l; corneal reflex intact b/l   Motor: Normal tone. Decreased bulk. At least 2/5 strength in proximal upper extremities b/l. At least 3/5 strength in distal upper extremities b/l. 0/5 strength in b/l lower extremities. Upper extremities paratonic L>R  Sensory: Holds eyes closed against resistance. Grimaces to painful stimuli of fingernail pressure  Reflexes: L biceps, triceps, and brachioradialis 2+; R biceps 3+; R brachioradialis and triceps 2+ and brisk; L patellar 2+; R patellar 3+; L ankle jerk 0+; R ankle jerk 2+; plantar reflex downgoing b/l  Coordination: unable to assess  Gait: unable to assess    LABS:                          8.9    22.54 )-----------( 85       ( 02 Mar 2022 04:00 )             28.2     03-02    138  |  107  |  15  ----------------------------<  120<H>  3.8   |  20<L>  |  0.94    Ca    8.9      02 Mar 2022 03:59  Phos  2.8     03-02  Mg     2.1     03-02    TPro  7.1  /  Alb  2.7<L>  /  TBili  0.4  /  DBili  x   /  AST  21  /  ALT  22  /  AlkPhos  120  03-02    PT/INR - ( 02 Mar 2022 04:00 )   PT: 19.1 sec;   INR: 1.64 ratio      PTT - ( 02 Mar 2022 04:00 )  PTT:30.7 sec    B12, A1C, TSH, Free T4 all WNL. Vit D low at 17.     RADIOLOGY & ADDITIONAL STUDIES:      CT head on 2/28/22 showed no hydrocephalus, no acute intracranial hemorrhage, no mass effect, no brain edema       JOINT, RICKY  Male  MRN-[id number]    HPI:  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 s/p HM2 LVAD in 9/2017 at  (due to severe PAD) with TV ring, multiple GI bleeds, thus maintained off AC, CKD, prior COVID-19 infection in 4/2020, recurrent syncope post LVAD s/p ILR, s/p prolonged complex hospitalization from 6/14-11/16/2021 initially admitted with abdominal pain complicated by GIB, respiratory failure s/p trach, multiple infections, s/p cholecystotomy tube and SMA stent 10/2021, ultimately discharged to [facility] rehab and then returned to [facility] for trach decannulation 12/2, readmitted in 2/2022 with recurrent COVID-19 infection, initially in distributive shock. Blood cultures were also positive for serratia marcescens which he has had in the past.     Pt was transferred to [facility] CICU from [facility]. He was sent to their ED from a rehab center due to AMS and tachycardia. At [facility], they noted a WBC greater than 20, tachycardia and hypercarbia on ABG. Pt was placed on BiPAP and transferred to [facility] CICU for concern for sepsis vs septic shock.     Per nursing home team, patient has been altered since 2/20/22. Per nursing team, patient occasionally opens his eyes and attempted to squeeze nurse's hand morning of 3/2/22, although by afternoon eyes no longer opened and no attempt at movement was made.         NIHSS:  MRS:     ROS: unable to assess 2/2 mental status    PAST MEDICAL & SURGICAL HISTORY:  CHF (congestive heart failure)    CAD (coronary artery disease)    Depression    Pleural effusion    History of 2019 novel coronavirus disease (COVID-19)  april 2020    Hemorrhoids    Bleeding hemorrhoids    Peripheral arterial disease    Claudication    BPH with urinary obstruction    ACC/AHA stage D systolic heart failure    Anticoagulation goal of INR 2.0 to 2.5    Falls    Clavicle fracture    CKD (chronic kidney disease), stage III    Iron deficiency anemia    H/O epistaxis    Vertigo    GI bleed    S/P TVR (tricuspid valve repair)    S/P ventricular assist device    S/P endoscopy    H/O tracheostomy      MEDICATIONS  (STANDING):  cefepime   IVPB 1000 milliGRAM(s) IV Intermittent every 12 hours  chlorhexidine 2% Cloths 1 Application(s) Topical <User Schedule>  cholecalciferol 1000 Unit(s) Oral daily  dextrose 40% Gel 15 Gram(s) Oral once  dextrose 50% Injectable 25 Gram(s) IV Push once  glucagon  Injectable 1 milliGRAM(s) IntraMuscular once  insulin lispro (ADMELOG) corrective regimen sliding scale   SubCutaneous every 6 hours  lidocaine   Infusion 1 mG/Min (7.5 mL/Hr) IV Continuous <Continuous>  methimazole 10 milliGRAM(s) Oral daily  metoclopramide 10 milliGRAM(s) Oral four times a day  multivitamin 1 Tablet(s) Oral daily  pantoprazole  Injectable 40 milliGRAM(s) IV Push daily  senna 2 Tablet(s) Oral at bedtime  sertraline 100 milliGRAM(s) Oral daily    MEDICATIONS  (PRN):    Allergies    Amiodarone Hydrochloride (Other (Severe))    Intolerances        VITAL SIGNS:  T(F): 97.3  HR: 87  BP: 93/70  RR: 22  SpO2: 99%    INCOMPLETE  Gen: stuporous, grimaces to painful stimuli (b/l fingernail bed pressure)  Eyes: Deferred secondary to COVID-19 pandemic; crusty  CV: LVAD in place  MSK: normal tone, decreased bulk. Evidence of prior L knee surgery   MS: Eyes closed; Does not open to deep noxious stimuli. Actively resists eye opening. No verbal output. Does not follow a simple commands.   CN: pupillary reflex intact b/l; corneal reflex intact b/l   Motor: Normal tone. Decreased bulk. At least 2/5 strength in proximal upper extremities b/l. At least 3/5 strength in distal upper extremities b/l. 0/5 strength in b/l lower extremities. Upper extremities paratonic L>R  Sensory: Holds eyes closed against resistance. Grimaces to painful stimuli of fingernail pressure  Reflexes: L biceps, triceps, and brachioradialis 2+; R biceps 3+; R brachioradialis and triceps 2+ and brisk; L patellar 2+; R patellar 3+; L ankle jerk 0+; R ankle jerk 2+; plantar reflex downgoing b/l  Coordination: unable to assess  Gait: unable to assess    LABS:                          8.9    22.54 )-----------( 85       ( 02 Mar 2022 04:00 )             28.2     03-02    138  |  107  |  15  ----------------------------<  120<H>  3.8   |  20<L>  |  0.94    Ca    8.9      02 Mar 2022 03:59  Phos  2.8     03-02  Mg     2.1     03-02    TPro  7.1  /  Alb  2.7<L>  /  TBili  0.4  /  DBili  x   /  AST  21  /  ALT  22  /  AlkPhos  120  03-02    PT/INR - ( 02 Mar 2022 04:00 )   PT: 19.1 sec;   INR: 1.64 ratio      PTT - ( 02 Mar 2022 04:00 )  PTT:30.7 sec    B12, A1C, TSH, Free T4 all WNL. Vit D low at 17.     RADIOLOGY & ADDITIONAL STUDIES:      CT head on 2/28/22 showed no hydrocephalus, no acute intracranial hemorrhage, no mass effect, no brain edema

## 2022-03-02 NOTE — PROGRESS NOTE ADULT - PROBLEM SELECTOR PLAN 4
- lidocaine gtt resumed  - no further NSVT with lidocaine back on  - EP following  - resumed on home dose of mexiletine via NGT

## 2022-03-02 NOTE — PROGRESS NOTE ADULT - SUBJECTIVE AND OBJECTIVE BOX
Subjective:    Medications:  cefepime   IVPB 1000 milliGRAM(s) IV Intermittent every 12 hours  chlorhexidine 2% Cloths 1 Application(s) Topical <User Schedule>  cholecalciferol 1000 Unit(s) Oral daily  dextrose 40% Gel 15 Gram(s) Oral once  dextrose 50% Injectable 25 Gram(s) IV Push once  gabapentin Solution 100 milliGRAM(s) Oral three times a day  glucagon  Injectable 1 milliGRAM(s) IntraMuscular once  insulin lispro (ADMELOG) corrective regimen sliding scale   SubCutaneous every 6 hours  lidocaine   Infusion 1 mG/Min IV Continuous <Continuous>  lidocaine 2% Syringe 50 milliGRAM(s) IV Push once  lidocaine 2% Syringe 100 milliGRAM(s) IV Push once  methimazole 10 milliGRAM(s) Oral daily  metoclopramide 10 milliGRAM(s) Oral four times a day  mexiletine 150 milliGRAM(s) Oral every 8 hours  midodrine 10 milliGRAM(s) Oral every 8 hours  mirtazapine 7.5 milliGRAM(s) Oral at bedtime  multivitamin 1 Tablet(s) Oral daily  norepinephrine Infusion 0.05 MICROgram(s)/kG/Min IV Continuous <Continuous>  pantoprazole  Injectable 40 milliGRAM(s) IV Push daily  senna 2 Tablet(s) Oral at bedtime  sertraline 100 milliGRAM(s) Oral daily  vasopressin Infusion 0.1 Unit(s)/Min IV Continuous <Continuous>      Physical Exam:    Vitals:  Vital Signs Last 24 Hours  T(C): 36 (22 @ 08:00), Max: 38 (22 @ 11:00)  HR: 66 (22 @ 10:30) (65 - 143)  BP: 93/70 (22 @ 10:00) (86/58 - 93/70)  RR: 20 (22 @ 10:30) (7 - 40)  SpO2: 100% (22 @ 10:30) (94% - 100%)    Weight in k.3 ( @ 05:00)    I&O's Summary    01 Mar 2022 07:01  -  02 Mar 2022 07:00  --------------------------------------------------------  IN: 2583.8 mL / OUT: 1442 mL / NET: 1141.8 mL    02 Mar 2022 07:01  -  02 Mar 2022 10:54  --------------------------------------------------------  IN: 159.6 mL / OUT: 204 mL / NET: -44.4 mL        Tele:    General: No distress. Comfortable.  HEENT: EOM intact.  Neck: Neck supple. JVP not elevated. No masses  Chest: Clear to auscultation bilaterally  CV: Normal S1 and S2. No murmurs, rub, or gallops. Radial pulses normal.  Abdomen: Soft, non-distended, non-tender  Skin: No rashes or skin breakdown  Neurology: Alert and oriented times three. Sensation intact  Psych: Affect normal    Labs:                        8.9    22.54 )-----------( 85       ( 02 Mar 2022 04:00 )             28.2     03-02    138  |  107  |  15  ----------------------------<  120<H>  3.8   |  20<L>  |  0.94    Ca    8.9      02 Mar 2022 03:59  Phos  2.8       Mg     2.1         TPro  7.1  /  Alb  2.7<L>  /  TBili  0.4  /  DBili  x   /  AST  21  /  ALT  22  /  AlkPhos  120  03    PT/INR - ( 02 Mar 2022 04:00 )   PT: 19.1 sec;   INR: 1.64 ratio         PTT - ( 02 Mar 2022 04:00 )  PTT:30.7 sec      Serum Pro-Brain Natriuretic Peptide: 12621 pg/mL ( @ 03:59)  Serum Pro-Brain Natriuretic Peptide: 5486 pg/mL ( @ 23:13)      Lactate Dehydrogenase, Serum: 211 U/L ( @ 03:59)  Lactate Dehydrogenase, Serum: 199 U/L ( @ 00:08)  Lactate Dehydrogenase, Serum: 381 U/L ( @ 23:13)    Lactate, Blood: 1.0 mmol/L ( @ 17:38)     Subjective:  - received 250ml albumin and 500ml IVF for low CVP with improvement  - resumed on vasopressin. midodrine started  - NSVT on tele. resumed on lidocaine gtt  - receiving TF  - pt somnolent, unable to provide ROS    Medications:  cefepime   IVPB 1000 milliGRAM(s) IV Intermittent every 12 hours  chlorhexidine 2% Cloths 1 Application(s) Topical <User Schedule>  cholecalciferol 1000 Unit(s) Oral daily  dextrose 40% Gel 15 Gram(s) Oral once  dextrose 50% Injectable 25 Gram(s) IV Push once  gabapentin Solution 100 milliGRAM(s) Oral three times a day  glucagon  Injectable 1 milliGRAM(s) IntraMuscular once  insulin lispro (ADMELOG) corrective regimen sliding scale   SubCutaneous every 6 hours  lidocaine   Infusion 1 mG/Min IV Continuous <Continuous>  lidocaine 2% Syringe 50 milliGRAM(s) IV Push once  lidocaine 2% Syringe 100 milliGRAM(s) IV Push once  methimazole 10 milliGRAM(s) Oral daily  metoclopramide 10 milliGRAM(s) Oral four times a day  mexiletine 150 milliGRAM(s) Oral every 8 hours  midodrine 10 milliGRAM(s) Oral every 8 hours  mirtazapine 7.5 milliGRAM(s) Oral at bedtime  multivitamin 1 Tablet(s) Oral daily  norepinephrine Infusion 0.05 MICROgram(s)/kG/Min IV Continuous <Continuous>  pantoprazole  Injectable 40 milliGRAM(s) IV Push daily  senna 2 Tablet(s) Oral at bedtime  sertraline 100 milliGRAM(s) Oral daily  vasopressin Infusion 0.1 Unit(s)/Min IV Continuous <Continuous>      Physical Exam:    Vitals:  Vital Signs Last 24 Hours  T(C): 36 (22 @ 08:00), Max: 38 (22 @ 11:00)  HR: 66 (22 @ 10:30) (65 - 143)  BP: 93/70 (22 @ 10:00) (86/58 - 93/70)  RR: 20 (22 @ 10:30) (7 - 40)  SpO2: 100% (22 @ 10:30) (94% - 100%)    LAVD Heart Mate 2  Speed 9200  Flow 4.9  Power 5.5  PI 5.7  no events  DLES dressing C/D/I    Weight in k.3 ( @ 05:00)    I&O's Summary    01 Mar 2022 07:01  -  02 Mar 2022 07:00  --------------------------------------------------------  IN: 2583.8 mL / OUT: 1442 mL / NET: 1141.8 mL    02 Mar 2022 07:01  -  02 Mar 2022 10:54  --------------------------------------------------------  IN: 159.6 mL / OUT: 204 mL / NET: -44.4 mL    Tele: SR PVCs, NSVT this AM     General: Frail. No acute distress.  HEENT: EOM intact. Open prior tracheostomy site without drainage  Neck: Neck supple. JVP not elevated. No masses  Chest: Clear to auscultation bilaterally  CV: LVAD hum. No palpable pulses. trace R pedal edema.  Abdomen: Soft, non-distended, non-tender. LVAD driveline site with dressing c/d/i. perc choley drain in place with bilious output.  Skin: warm peripherally.  Neurology: Somnolent, mild grimace to repeated sternal rub  Psych: MARELY    Labs:                        8.9    22.54 )-----------( 85       ( 02 Mar 2022 04:00 )             28.2     03-02    138  |  107  |  15  ----------------------------<  120<H>  3.8   |  20<L>  |  0.94    Ca    8.9      02 Mar 2022 03:59  Phos  2.8     03-02  Mg     2.1     03-02    TPro  7.1  /  Alb  2.7<L>  /  TBili  0.4  /  DBili  x   /  AST  21  /  ALT  22  /  AlkPhos  120  03-02    PT/INR - ( 02 Mar 2022 04:00 )   PT: 19.1 sec;   INR: 1.64 ratio         PTT - ( 02 Mar 2022 04:00 )  PTT:30.7 sec    Serum Pro-Brain Natriuretic Peptide: 79788 pg/mL ( @ 03:59)  Serum Pro-Brain Natriuretic Peptide: 5486 pg/mL ( @ 23:13)    Lactate Dehydrogenase, Serum: 211 U/L ( @ 03:59)  Lactate Dehydrogenase, Serum: 199 U/L ( @ 00:08)  Lactate Dehydrogenase, Serum: 381 U/L ( @ 23:13)    Lactate, Blood: 1.0 mmol/L ( @ 17:38)

## 2022-03-02 NOTE — PROGRESS NOTE ADULT - ATTENDING COMMENTS
Extensive medical history including HFrEF s/p LVAD transferred to CICU with acute respiratory failure and altered mental status  A+Ox0, non-verbal and follows only minimal commands, CT head benign  Hold Remeron and Gabapentin  LVAD in place, approximately 5 LPM of flow  Septic shock requiring Vasopressin infusion, s/p multiple liters of crystalloid fluid; SvO2 70s  Having runs of VT on Mexiletine, bolus Lidocaine and restart drip   SpO2 mid 90s on nasal cannula  Tolerating tube feeds  Normal renal function, low filling pressures - target even to 500 cc positive  H/H low but acceptable, no lab evidence of hemolysis   Sepsis, blood cultures +Serratia - stop Vancomycin, continue Cefepime  Unclear source of infection, concern for LVAD seeding vs. prior splenic abscess - obtain CT abd/pelvis  Sugars controlled; continue Methimazole   TLC and nabil 2/28  Poor prognosis - Palliative is following and there is a family meeting tomorrow. Unfortunately, escalating care for this patient is likely futile.

## 2022-03-02 NOTE — PROGRESS NOTE ADULT - ATTENDING COMMENTS
Recurrent VT in the setting of infection. Hemodynamically tolerated on LVAD. Lidocaine PRN for breakthrough arrhythmia. Would not continue standing drip right now.

## 2022-03-02 NOTE — DIETITIAN INITIAL EVALUATION ADULT. - OTHER INFO
Pt NPO upon admission 2/27 and now with NGT for EN. Jevity 1.2 ordered for goal rate of 40ml/hr x 24hrs, at goal provides 960ml formula, 1152kcals, 53g protein, 775ml free H2O (24kcals/kg & 1.1g protein/kg based on dosing wt 48.7kg). EN initiated 3/1 @ 15:00 at trophic rate, feeds have slowly been titrated up and currently infusing at 30ml/hr  - Pressor support (vaso) currently held  - Multivitamin and Vitamin D3 supplementation ordered    GI: Reglan ordered for gut motility - pt received throughout hospitalization on prior admissions as well. Last BM 3/2 - bowel regimen ordered (senna)  - Pt with chronic dawn drain, output is as follows: 90ml (3/1), 145ml (2/28)    Weights within the past year: 176.3 pounds (6/15/21), 135.8 Ibs (10/18/21), 109.8 Ibs (11/11/21).   Dosing weight 107.3Ibs (2/27) - Indicates 39% weight loss x < 1 year (clinically significant)  Bed scale weights in-house: 119.9Ibs (3/1), 121.9Ibs (3/2)

## 2022-03-02 NOTE — DIETITIAN INITIAL EVALUATION ADULT. - ENTERAL
Continue Jevity 1.2 feeds; as medically feasible/tolerated slowly titrate up to NEW goal rate of 55ml/hr x 24hrs. At goal to provide 1320ml formula, 1584kcals, 73g protein, 1065ml free H2O (meets 32.5kcals/kg & 1.5g protein/kg based on dosing weight 48.7kg)

## 2022-03-02 NOTE — DIETITIAN INITIAL EVALUATION ADULT. - ADD RECOMMEND
EN recommendations as above; RD to remain available to adjust EN formulary, volume/rate PRN. recommend addition of thiamin and Vitamin C supplementation daily; continue additional micronutrient supplementation as ordered. Monitor electrolytes and replenish PRN. Monitor nutritional intake/tolerance, weights, labs, hydration status, bowels, and skin integrity.

## 2022-03-02 NOTE — PROGRESS NOTE ADULT - ASSESSMENT
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 The patient had a recent admission with COVID 19 reinfection and distributive shock. That admission he had blood culture positive for serratia marcescens (discharged on levaquin).  He was treated with MAB, remdesivir, and steroids. He had recurrent VT and was started mexiletine. Now coming in from [facility] ED with leukocytosis and AMS was treated for UTI. Initially required bipap but was weaned off here. Labs concerning here for WC 26, hemoglobin of 7s then 6.7, BNP 5K, creatinine of 1.3. His maps were initially in the 50s. Physical exam showing pus from stoma, sat of central access of 81.8, and tachycardia are all concerning for septic shock picture. He is status 1L fluids, 1uPRBCs, and placed on vaso and lidocaine     Upon admission HM2 4.8 LPM, 9200 RPM     Bacteremic with recurrent serratia, on IV abx possibly r/t LVAD infection vs splenic abscess noted on CT 1/19. Febrile over the past 24 hours with Tmax 38.4. Stable leukocytosis. Worsening mental status this morning. Somnolent. Low CVP overnight improved with albumin/IVF. Requiring reinitiation of vasopressors in addition to midodrine. NSVT off lidocaine on mexiletine now improved with resuming lidocaine. Overall, critically ill with guarded prognosis.

## 2022-03-02 NOTE — DIETITIAN NUTRITION RISK NOTIFICATION - TREATMENT: THE FOLLOWING DIET HAS BEEN RECOMMENDED
Diet, NPO with Tube Feed:   Tube Feeding Modality: Nasogastric  Jevity 1.2 Tank (JEVITY1.2RTH)  Total Volume for 24 Hours (mL): 960  Continuous  Starting Tube Feed Rate {mL per Hour}: 10  Increase Tube Feed Rate by (mL): 10     Every 4 hours  Until Goal Tube Feed Rate (mL per Hour): 40  Tube Feed Duration (in Hours): 24  Tube Feed Start Time: 13:45 (03-01-22 @ 13:44) [Active]

## 2022-03-02 NOTE — CHART NOTE - NSCHARTNOTEFT_GEN_A_CORE
I called the patient's HCP that indicated she will be coming on 3/3/2022 between 11 and 11:30 for a FM. The patient's son will be calling in as well.     d/w the CICU team and Tamanna Burns LVAD coordinator.       Francisco Burgos MD   Geriatrics and Palliative Care ([facility]) Consult Service    of Geriatric and Palliative Medicine  [facility]      Please page the following number for clinical matters between the hours of 9 am and 5 pm from Monday through Friday : [phone]    After 5pm and on weekends, please see the contact information below:    In the event of newly developing, evolving, or worsening symptoms, please contact the Palliative Medicine team via pager (if the patient is at [facility] #[phone] or if the patient is at [facility] #[phone]) The Geriatric and Palliative Medicine service has coverage 24 hours a day/ 7 days a week to provide medical recommendations regarding symptom management needs via telephone

## 2022-03-02 NOTE — PROGRESS NOTE ADULT - ASSESSMENT
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 s/p HM2 LVAD in 9/2017 at  (due to severe PAD) with TV ring, multiple GI bleeds, thus maintained off AC, CKD 3prior COVID-19 infection in 4/2020, recurrent syncope post LVAD s/p ILR, s/p prolonged complex hospitalization from 6/14-11/16/2021 initially admitted with abdominal pain complicated by GIB, respiratory failure s/p trach, multiple infections, s/p cholecystotomy tube and SMA stent 10/2021, ultimately discharged to [facility] rehab and then returned to [facility] for trach decannulation 12/2. The patient had a recent admission with COVID 19 reinfection and distributive shock. That admission he had blood culture positive for serratia marcescens (discharged on levaquin).  He was treated with MAB, remdesivir, and steroids. He had recurrent VT and was started mexiletine. Now coming in from [facility] ED with leukocytosis and AMS was treated for UTI. Initially required bipap but was weaned off here. Labs concerning here for WC 26, hemoglobin of 7s then 6.7, BNP 5K, creatinine of 1.3. His maps were initially in the 50s. Physical exam showing pus from stoma, sat of central access of 81.8, fevers, and tachycardia are all concerning for septic shock picture. He is status 1L fluids, 1uPRBCs, and placed on vaso. Patient has known episodes of VT and had recurrent NSVT/VT when he first arrived started on lidocaine 1 with improvement. Of note he was previously on mexiletine 150 TID outpatient, metoprolol ER 25 qAM and 50 qPM. Previous history of thyrotoxicosis on AMIO. Blood cultures continue to grow serratia marcescens.     #VT   -Continue lidocaine gtt at this time   -Would hold mexiletine   -Restart BB when not in shock   -Keep K>4 and Mg>2

## 2022-03-02 NOTE — PROGRESS NOTE ADULT - SUBJECTIVE AND OBJECTIVE BOX
RICKY HAMPTON  MRN-[id number]  Patient is a 66y old  Male who presents with a chief complaint of Septic shock (02 Mar 2022 14:11)    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 s/p HM2 LVAD in 2017 at  (due to severe PAD) with TV ring, multiple GI bleeds, thus maintained off AC, CKD 3prior COVID-19 infection in 2020, recurrent syncope post LVAD s/p ILR, s/p prolonged complex hospitalization from -2021 initially admitted with abdominal pain complicated by GIB, respiratory failure s/p trach, multiple infections, s/p cholecystotomy tube and SMA stent 10/2021, ultimately discharged to [facility] rehab and then returned to [facility] for trach decannulation , readmitted in 2022 with recurrent COVID-19 infection, initially in distributive shock. Blood cultures were also positive for serratia marcescens which he has had in the past.     Pt was transferred to [facility] CICU from [facility]. He was sent to their ED from a rehab center due to AMS and tachycardia. At [facility], they noted a WBC greater than 20, tachycardia and hypercarbia on ABG. Pt was placed on BiPAP and transferred to [facility] CICU for concern for sepsis vs septic shock.      (2022 22:20)      Hospital Course:   Transferred here to the CICU due to sepsis vs septic shock     24 HOUR EVENTS:    REVIEW OF SYSTEMS:    CONSTITUTIONAL: No weakness, fevers or chills  EYES/ENT: No visual changes;  No vertigo or throat pain   NECK: No pain or stiffness  RESPIRATORY: No cough, wheezing, hemoptysis; No shortness of breath  CARDIOVASCULAR: No chest pain or palpitations  GASTROINTESTINAL: No abdominal or epigastric pain. No nausea, vomiting, or hematemesis; No diarrhea or constipation. No melena or hematochezia.  GENITOURINARY: No dysuria, frequency or hematuria  NEUROLOGICAL: No numbness or weakness  SKIN: No itching, rashes      ICU Vital Signs Last 24 Hrs  T(C): 37.4 (02 Mar 2022 18:00), Max: 37.5 (02 Mar 2022 00:00)  T(F): 99.3 (02 Mar 2022 18:00), Max: 99.5 (02 Mar 2022 00:00)  HR: 101 (02 Mar 2022 19:00) (65 - 143)  BP: 93/70 (02 Mar 2022 10:00) (86/58 - 93/70)  BP(mean): 78 (02 Mar 2022 10:00) (68 - 78)  ABP: 85/72 (02 Mar 2022 19:00) (56/48 - 105/86)  ABP(mean): 78 (02 Mar 2022 19:00) (52 - 97)  RR: 30 (02 Mar 2022 19:00) (13 - 40)  SpO2: 97% (02 Mar 2022 19:00) (95% - 100%)      CVP(mm Hg): 4 (-22 @ 19:00) (0 - 13)  CO: --  CI: --  PA: --  PA(mean): --  PA(direct): --  PCWP: --  LA: --  RA: --  SVR: --  SVRI: --  PVR: --  PVRI: --  I&O's Summary    01 Mar 2022 07:01  -  02 Mar 2022 07:00  --------------------------------------------------------  IN: 2578.8 mL / OUT: 1442 mL / NET: 1136.8 mL    02 Mar 2022 07:01  -  02 Mar 2022 20:18  --------------------------------------------------------  IN: 764.1 mL / OUT: 514 mL / NET: 250.1 mL        CAPILLARY BLOOD GLUCOSE    CAPILLARY BLOOD GLUCOSE      POCT Blood Glucose.: 137 mg/dL (02 Mar 2022 17:21)      PHYSICAL EXAM:  Constitutional: Patient laying in bed, NAD  Head: Atraumatic, normocephalic  Eyes: No scleral icterus. PERRLA.  ENMT: Dry mucous membrane. Uvula midline. Stoma  dry appearing  Neck: Supple, No JVD  Respiratory: CTA B/L although overall decreased breath sounds 2/2 poor effort. No wheezes, rales or rhonchi   Cardiovascular: LVAD hum present   Gastrointestinal: Nondistended, BSx4, soft, nontender.  Extremities: Moves all extremities. Warm, no edema, nontender  Vascular: 2+ DP/PT pulses B/L   Neurological: Awake, aphasic, not answering questions. Follows commands. B/L weakness in lower and upper extremities   Skin: No rashes on exposed skin       ============================I/O===========================   I&O's Detail    01 Mar 2022 07:01  -  02 Mar 2022 07:00  --------------------------------------------------------  IN:    Albumin 5%  - 250 mL: 1000 mL    Enteral Tube Flush: 230 mL    IV PiggyBack: 580 mL    IV PiggyBack: 600 mL    Jevity 1.2: 140 mL    Vasopressin: 21.6 mL    Vasopressin: 7.2 mL  Total IN: 2578.8 mL    OUT:    Drain (mL): 30 mL    Voided (mL): 1412 mL  Total OUT: 1442 mL    Total NET: 1136.8 mL      02 Mar 2022 07:01  -  02 Mar 2022 20:18  --------------------------------------------------------  IN:    Enteral Tube Flush: 240 mL    IV PiggyBack: 100 mL    Jevity 1.2: 320 mL    Lidocaine: 82.5 mL    Vasopressin: 8.4 mL    Vasopressin: 10.8 mL    Vasopressin: 2.4 mL  Total IN: 764.1 mL    OUT:    Drain (mL): 20 mL    Voided (mL): 494 mL  Total OUT: 514 mL    Total NET: 250.1 mL        ============================ LABS =========================                        8.9    22.54 )-----------( 85       ( 02 Mar 2022 04:00 )             28.2         138  |  107  |  15  ----------------------------<  120<H>  3.8   |  20<L>  |  0.94    Ca    8.9      02 Mar 2022 03:59  Phos  2.8       Mg     2.1         TPro  7.1  /  Alb  2.7<L>  /  TBili  0.4  /  DBili  x   /  AST  21  /  ALT  22  /  AlkPhos  120      Troponin T, High Sensitivity Result: 24 ng/L (22 @ 23:13)              LIVER FUNCTIONS - ( 02 Mar 2022 03:59 )  Alb: 2.7 g/dL / Pro: 7.1 g/dL / ALK PHOS: 120 U/L / ALT: 22 U/L / AST: 21 U/L / GGT: x           PT/INR - ( 02 Mar 2022 04:00 )   PT: 19.1 sec;   INR: 1.64 ratio         PTT - ( 02 Mar 2022 04:00 )  PTT:30.7 sec  ABG - ( 02 Mar 2022 03:55 )  pH, Arterial: 7.35  pH, Blood: x     /  pCO2: 37    /  pO2: 172   / HCO3: 20    / Base Excess: -4.8  /  SaO2: 99.3              Blood Gas Arterial, Lactate: 0.6 mmol/L (22 @ 03:55)  Blood Gas Arterial, Lactate: 1.2 mmol/L (22 @ 23:11)  Lactate, Blood: 1.0 mmol/L (22 @ 17:38)  Blood Gas Arterial, Lactate: 1.1 mmol/L (22 @ 10:43)  Blood Gas Arterial, Lactate: 0.9 mmol/L (22 @ 06:12)  Blood Gas Arterial, Lactate: 0.8 mmol/L (22 @ 00:05)  Blood Gas Arterial, Lactate: 0.8 mmol/L (22 @ 20:55)    Urinalysis Basic - ( 01 Mar 2022 00:42 )    Color: Yellow / Appearance: Clear / S.022 / pH: x  Gluc: x / Ketone: Small  / Bili: Negative / Urobili: Negative   Blood: x / Protein: 30 mg/dL / Nitrite: Negative   Leuk Esterase: Moderate / RBC: 199 /hpf / WBC 22 /HPF   Sq Epi: x / Non Sq Epi: 3 /hpf / Bacteria: Negative      ======================Micro/Rad/Cardio=================  Telemtry: Reviewed   EKG: Reviewed  CXR: Reviewed  Culture: Reviewed   Echo: Transthoracic Echocardiogram:   Patient name: RICKY HAMPTON  YOB: 1955   Age: 66 (M)   MR#: [id number]  Study Date: 2022  [facility]  Study quality: Technically fair  Referring Physician: Jus Jensen MD  Blood Pressure: 96/81 mmHg  Height: 178 cm  Weight: 49 kg  BSA: 1.6 m2  ------------------------------------------------------------------------  PROCEDURE: Transthoracic echocardiogram with 2-D, M-Mode  and complete spectral and color flow Doppler.  INDICATION: Chronic ischemic heart disease, unspecified  (I25.9)  ------------------------------------------------------------------------  Dimensions:    Normal Values:  LA:     3.1    2.0 - 4.0 cm  Ao:     3.6    2.0 - 3.8 cm  SEPTUM: 1.1    0.6 - 1.2 cm  PWT: 1.1    0.6 - 1.1 cm  LVIDd:  4.1    3.0 - 5.6 cm  LVIDs:  3.9    1.8 - 4.0 cm  Derived variables:  LVMI: 95 g/m2  RWT: 0.53  Fractional short: 5 %  EF (Visual Estimate): 10 %  ------------------------------------------------------------------------  Observations:  Mitral Valve: Thickened mitral valve. Tethered mitral valve  leaflets with normal opening. Moderate mitral  regurgitation. Peak mitral valve gradient equals 6 mm Hg,  mean transmitral valve gradient equals 2 mm Hg, consistent  with mild mitral stenosis.  Aortic Valve/Aorta: Calcified aortic valve which remains  closed on all observed beats.  Mild continuous aortic  regurgitation.  Aortic Root: 3.6 cm.  Left Atrium: Normal left atrium.  LA volume index = 30  cc/m2.  Left Ventricle: Severe global left ventricular systolic  dysfunction.  Severe reversible diastolic dysfunction  (Stage III).  Right Heart: Normal right atrium. Normal right ventricular  size with decreased right ventricular systolic function. An  annuloplasty ring isseen in the tricuspid position.  Moderate tricuspid regurgitation.  Peak tricuspid valve  gradient equals 6 mm Hg, mean transmitral valve gradient  equals 2 mm Hg. Gradients measured at a HR of 88bpm.  Normal pulmonic valve. Mild-moderate pulmonic  regurgitation.  Pericardium/Pleura: Normal pericardium with no pericardial  effusion.  Hemodynamic: Estimated right ventricular systolic pressure  equals 25.36 - 29.36 mm Hg, assuming right atrial pressure  equals 6 - 10 mm Hg, consistent with normal pulmonary  hypertension. Color Doppler demonstrates no evidence of a  patent foramen ovale.  ------------------------------------------------------------------------  Conclusions:  1. Thickened mitral valve. Tethered mitral valve leaflets  with normal opening. Moderate mitral regurgitation. Peak  mitral valve gradient equals 6 mm Hg, mean transmitral  valve gradient equals 2 mm Hg, consistent with mild mitral  stenosis.  2. Calcified aortic valve which remains closed on all  observed beats.  Mild continuous aortic regurgitation.  3. Severe global left ventricular systolic dysfunction.  4. A Heartmate 2 LVAD at a sped of 9200RPM is present. The  aortic valve is closed.  There is mild continuious AI.  The  septum is midline. Pulsitile flow measuring 0.8m/s is  measured in to the LVAD inflow cannular  5. Normal right ventricular size with decreased right  ventricular systolic function.  6. An annuloplasty ring is seen in the tricuspid position.  Moderate tricuspid regurgitation.  Peak tricuspid valve  gradient equals 6 mm Hg, mean transmitral valve gradient  equals 2 mm Hg. Gradients measured at a HR of 88bpm.  7. Normal pulmonic valve. Mild-moderate pulmonic  regurgitation.  *** Compared with echocardiogram of 2022, the LVEDD is  smaller in the current study.  Laminar flow into the LVAD  is now seen.  ------------------------------------------------------------------------  Confirmed on  3/2/2022 - 11:39:36 by Justino Nichols M.D.  ------------------------------------------------------------------------ (22 @ 07:27)    ======================================================  PAST MEDICAL & SURGICAL HISTORY:  CHF (congestive heart failure)    CAD (coronary artery disease)    Depression    Pleural effusion    History of 2019 novel coronavirus disease (COVID-19)  2020    Hemorrhoids    Bleeding hemorrhoids    Peripheral arterial disease    Claudication    BPH with urinary obstruction    ACC/AHA stage D systolic heart failure    Anticoagulation goal of INR 2.0 to 2.5    Falls    Clavicle fracture    CKD (chronic kidney disease), stage III    Iron deficiency anemia    H/O epistaxis    Vertigo    GI bleed    S/P TVR (tricuspid valve repair)    S/P ventricular assist device    S/P endoscopy    H/O tracheostomy      ====================ASSESSMENT ==============  ACC/AHA stage D systolic heart failure s/p LVAD  Ventricular Tachycardia  Hypercarbia  Hx trach w/ stoma  Anemia  RASHAWN, now resolved   Hx of GI Bleed   Hx Hyperthyroidism   Septic shock  AMS      Plan:  ====================== NEUROLOGY=====================  AMS  - per nursing home staff pt has been altered for one week  - pt AAOx0, not verbally responding to staff  - pt following commands  - continue to monitor neuro status  - CTH at OSH negative for acute pathology   -  CTH: Mild to mod white matter microvascular ischemic disease. No acute pathology   - unable to get Brain MRI  LVAD. consider repeat CTH in a few days if AMS unchanges  - will consult neuro consult  - so s/s of any subclinical seizures, continue to monitor   - Will rpt CTH with pan-scan       sertraline 100 milliGRAM(s) Oral daily    ==================== RESPIRATORY======================  Hypercarbia  - Pt hypercarbic at OSH, placed on BiPAP, now weaned off  - appears comfortable on NC 3L this AM  - continue to monitor respiratory status   - wean NC as tolerated with goal SpO2 >94%    Hx trach w/ stoma  - Pt trach x2 cycles in past  - most recently in setting of COVID PNA in 2022  - Pt decannulated after admission  - purulent drainage in stoma site, cultured pending results  - ENT states no sign of infection at Stoma site- Plan for possible TE fistula closure when pt is stable as per ENT      ====================CARDIOVASCULAR==================  ACC/AHA stage D systolic heart failure s/p LVAD  - pt appeared hypovolemic on admission  - weaned off vaso gtt at 10pm 3/1 s/p fluid resusitation. Had to be restarted on vaso gtt and started on levo gtt for hypotension associated with is arrythmia. Wean as tolerated with goal MAP 70  - central sat sent from intro 81.8 with negative lactate x4  - LVAD coordinator and HF attending contacted     Ventricular Tachycardia  - pt has history of VT on previous admissions, takes home mexiletine  - significant NSVT and VT seen on tele in CICU overnight and this AM  - s/p lido bolus 100mg and 50mg and started on lido gtt restarted and holding home Mexiletine as per EP  - F/U further final EP recs   - Pt hypotensive with ectopy, had to be restarted on vaso gtt       lidocaine   Infusion 1 mG/Min (7.5 mL/Hr) IV Continuous <Continuous>  vasopressin Infusion 0.04 Unit(s)/Min (2.4 mL/Hr) IV Continuous <Continuous>  ===================HEMATOLOGIC/ONC ===================  Anemia  - Hgb on admission 7.4 with subsequent drop to 6.6  - s/p 2u PRBC . Now h/h has been stable >48hrs. Will check cbc q12-24  - of note, pt has hx of GI bleeds  - no blood noted in BM, continue to monitor   - transfuse as needed if hgb <7      ===================== RENAL =========================  RASHAWN, now resolved   - continue to monitor SCr, BUN, lytes, and I&Os  - replete lytes prn with goal K 4-4.5 and mg >2      ==================== GASTROINTESTINAL===================  parHx GI bleed in past req multiple transfusions   - no overt bleeding noted on this admission   - monitor BM for signs of blood. h/h stable for past 24 hrs without any blood noted in BM  - c/w PPI  - continue with TF as tolerated for goal rate of 40mL  - Bowel regimen with Senna    cholecalciferol 1000 Unit(s) Oral daily  metoclopramide 10 milliGRAM(s) Oral four times a day  multivitamin 1 Tablet(s) Oral daily  G prophylaxis, pantoprazole  Injectable 40 milliGRAM(s) IV Push daily  senna 2 Tablet(s) Oral at bedtime    =======================    ENDOCRINE  =====================  Hx Hyperthyroidism   - continue home methimazole     dextrose 40% Gel 15 Gram(s) Oral once  dextrose 50% Injectable 25 Gram(s) IV Push once  glucagon  Injectable 1 milliGRAM(s) IntraMuscular once  insulin lispro (ADMELOG) corrective regimen sliding scale   SubCutaneous every 6 hours  methimazole 10 milliGRAM(s) Oral daily      ========================INFECTIOUS DISEASE================  Septic shock  - pt arrived tachycardic 120s, hypotensive MAPs 50-60, AMS  - WBC 22.54  - pt got zosyn and vancomycin x1 at OSH. switched from zosyn to cefepime. s/p vanco-, d/c yesterday as BCX with serratia.   - f/u infectious workup. BCX  gram neg rods x4 bottles-- growing serratia. pending sensitives   - f/u stoma culture sent   - CT C/A/P at OSH: cholecystostomy in place, patchy opacities vs. atelectasis as per written transfer report (CD and official report not sent with transfer)   - OSH BCX prelim gram neg rods in anaerobic bottle. continue to follow [facility] (BCX taken prior to abx initiation)  - ID following- recommending repeat CT C/A/P w/ con to look for source       cefepime   IVPB 1000 milliGRAM(s) IV Intermittent every 12 hours      Patient requires continuous monitoring with bedside rhythm monitoring, pulse ox monitoring, and intermittent blood gas analysis. Care plan discussed with ICU care team. Patient remained critical and at risk for life threatening decompensation.  Patient seen, examined and plan discussed with CCU team during rounds.     I have personally provided ____ minutes of critical care time excluding time spent on separate procedures, in addition to initial critical care time provided by the CICU Attending, Dr. Jensen .     By signing my name below, I, Raquel Barone, attest that this documentation has been prepared under the direction and in the presence of Tamie Vázquez  Electronically signed: Cesia Schmitt, 22 @ 20:18    I, Tamie Vázquez, personally performed the services described in this documentation. all medical record entries made by the luisibmel were at my direction and in presence. I have reviewed the chart and agree that the record reflects my personal performance and is accurate and complete  Electronically signed: Tamie Vázquez       RICKY HAMPTON  MRN-[id number]  Patient is a 66y old  Male who presents with a chief complaint of Septic shock (02 Mar 2022 14:11)    HPI:  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 s/p HM2 LVAD in 2017 at  (due to severe PAD) with TV ring, multiple GI bleeds, thus maintained off AC, CKD 3prior COVID-19 infection in 2020, recurrent syncope post LVAD s/p ILR, s/p prolonged complex hospitalization from -2021 initially admitted with abdominal pain complicated by GIB, respiratory failure s/p trach, multiple infections, s/p cholecystotomy tube and SMA stent 10/2021, ultimately discharged to [facility] rehab and then returned to [facility] for trach decannulation , readmitted in 2022 with recurrent COVID-19 infection, initially in distributive shock. Blood cultures were also positive for serratia marcescens which he has had in the past.     Pt was transferred to [facility] CICU from [facility]. He was sent to their ED from a rehab center due to AMS and tachycardia. At [facility], they noted a WBC greater than 20, tachycardia and hypercarbia on ABG. Pt was placed on BiPAP and transferred to [facility] CICU for concern for sepsis vs septic shock.      (2022 22:20)      24 HOUR EVENTS: no acute events    REVIEW OF SYSTEMS:    CONSTITUTIONAL: No weakness, fevers or chills  EYES/ENT: No visual changes;  No vertigo or throat pain   NECK: No pain or stiffness  RESPIRATORY: No cough, wheezing, hemoptysis; No shortness of breath  CARDIOVASCULAR: No chest pain or palpitations  GASTROINTESTINAL: No abdominal or epigastric pain. No nausea, vomiting, or hematemesis; No diarrhea or constipation. No melena or hematochezia.  GENITOURINARY: No dysuria, frequency or hematuria  NEUROLOGICAL: No numbness or weakness  SKIN: No itching, rashes      ICU Vital Signs Last 24 Hrs  T(C): 37.4 (02 Mar 2022 18:00), Max: 37.5 (02 Mar 2022 00:00)  T(F): 99.3 (02 Mar 2022 18:00), Max: 99.5 (02 Mar 2022 00:00)  HR: 101 (02 Mar 2022 19:00) (65 - 143)  BP: 93/70 (02 Mar 2022 10:00) (86/58 - 93/70)  BP(mean): 78 (02 Mar 2022 10:00) (68 - 78)  ABP: 85/72 (02 Mar 2022 19:00) (56/48 - 105/86)  ABP(mean): 78 (02 Mar 2022 19:00) (52 - 97)  RR: 30 (02 Mar 2022 19:00) (13 - 40)  SpO2: 97% (02 Mar 2022 19:00) (95% - 100%)      CVP(mm Hg): 4 (- @ 19:00) (0 - 13)    I&O's Summary    01 Mar 2022 07:01  -  02 Mar 2022 07:00  --------------------------------------------------------  IN: 2578.8 mL / OUT: 1442 mL / NET: 1136.8 mL    02 Mar 2022 07:01  -  02 Mar 2022 20:18  --------------------------------------------------------  IN: 764.1 mL / OUT: 514 mL / NET: 250.1 mL        CAPILLARY BLOOD GLUCOSE    CAPILLARY BLOOD GLUCOSE      POCT Blood Glucose.: 137 mg/dL (02 Mar 2022 17:21)        ============================I/O===========================   I&O's Detail    01 Mar 2022 07:01  -  02 Mar 2022 07:00  --------------------------------------------------------  IN:    Albumin 5%  - 250 mL: 1000 mL    Enteral Tube Flush: 230 mL    IV PiggyBack: 580 mL    IV PiggyBack: 600 mL    Jevity 1.2: 140 mL    Vasopressin: 21.6 mL    Vasopressin: 7.2 mL  Total IN: 2578.8 mL    OUT:    Drain (mL): 30 mL    Voided (mL): 1412 mL  Total OUT: 1442 mL    Total NET: 1136.8 mL      02 Mar 2022 07:01  -  02 Mar 2022 20:18  --------------------------------------------------------  IN:    Enteral Tube Flush: 240 mL    IV PiggyBack: 100 mL    Jevity 1.2: 320 mL    Lidocaine: 82.5 mL    Vasopressin: 8.4 mL    Vasopressin: 10.8 mL    Vasopressin: 2.4 mL  Total IN: 764.1 mL    OUT:    Drain (mL): 20 mL    Voided (mL): 494 mL  Total OUT: 514 mL    Total NET: 250.1 mL        ============================ LABS =========================                        8.9    22.54 )-----------( 85       ( 02 Mar 2022 04:00 )             28.2     03-02    138  |  107  |  15  ----------------------------<  120<H>  3.8   |  20<L>  |  0.94    Ca    8.9      02 Mar 2022 03:59  Phos  2.8     03-02  Mg     2.1     03-02    TPro  7.1  /  Alb  2.7<L>  /  TBili  0.4  /  DBili  x   /  AST  21  /  ALT  22  /  AlkPhos  120  03-02    Troponin T, High Sensitivity Result: 24 ng/L (22 @ 23:13)              LIVER FUNCTIONS - ( 02 Mar 2022 03:59 )  Alb: 2.7 g/dL / Pro: 7.1 g/dL / ALK PHOS: 120 U/L / ALT: 22 U/L / AST: 21 U/L / GGT: x           PT/INR - ( 02 Mar 2022 04:00 )   PT: 19.1 sec;   INR: 1.64 ratio         PTT - ( 02 Mar 2022 04:00 )  PTT:30.7 sec  ABG - ( 02 Mar 2022 03:55 )  pH, Arterial: 7.35  pH, Blood: x     /  pCO2: 37    /  pO2: 172   / HCO3: 20    / Base Excess: -4.8  /  SaO2: 99.3              Blood Gas Arterial, Lactate: 0.6 mmol/L (22 @ 03:55)  Blood Gas Arterial, Lactate: 1.2 mmol/L (22 @ 23:11)  Lactate, Blood: 1.0 mmol/L (22 @ 17:38)  Blood Gas Arterial, Lactate: 1.1 mmol/L (22 @ 10:43)  Blood Gas Arterial, Lactate: 0.9 mmol/L (22 @ 06:12)  Blood Gas Arterial, Lactate: 0.8 mmol/L (22 @ 00:05)  Blood Gas Arterial, Lactate: 0.8 mmol/L (22 @ 20:55)    Urinalysis Basic - ( 01 Mar 2022 00:42 )    Color: Yellow / Appearance: Clear / S.022 / pH: x  Gluc: x / Ketone: Small  / Bili: Negative / Urobili: Negative   Blood: x / Protein: 30 mg/dL / Nitrite: Negative   Leuk Esterase: Moderate / RBC: 199 /hpf / WBC 22 /HPF   Sq Epi: x / Non Sq Epi: 3 /hpf / Bacteria: Negative      ======================Micro/Rad/Cardio=================  Telemtry: Reviewed   EKG: Reviewed  CXR: Reviewed  Culture: Reviewed   Echo: Transthoracic Echocardiogram:   Patient name: RICKY HAMPTON  YOB: 1955   Age: 66 (M)   MR#: [id number]  Study Date: 2022  [facility] Salvador Sapp [facility]  Study quality: Technically fair  Referring Physician: Jus Jensen MD  Blood Pressure: 96/81 mmHg  Height: 178 cm  Weight: 49 kg  BSA: 1.6 m2  ------------------------------------------------------------------------  PROCEDURE: Transthoracic echocardiogram with 2-D, M-Mode  and complete spectral and color flow Doppler.  INDICATION: Chronic ischemic heart disease, unspecified  (I25.9)  ------------------------------------------------------------------------  Dimensions:    Normal Values:  LA:     3.1    2.0 - 4.0 cm  Ao:     3.6    2.0 - 3.8 cm  SEPTUM: 1.1    0.6 - 1.2 cm  PWT: 1.1    0.6 - 1.1 cm  LVIDd:  4.1    3.0 - 5.6 cm  LVIDs:  3.9    1.8 - 4.0 cm  Derived variables:  LVMI: 95 g/m2  RWT: 0.53  Fractional short: 5 %  EF (Visual Estimate): 10 %  ------------------------------------------------------------------------  Observations:  Mitral Valve: Thickened mitral valve. Tethered mitral valve  leaflets with normal opening. Moderate mitral  regurgitation. Peak mitral valve gradient equals 6 mm Hg,  mean transmitral valve gradient equals 2 mm Hg, consistent  with mild mitral stenosis.  Aortic Valve/Aorta: Calcified aortic valve which remains  closed on all observed beats.  Mild continuous aortic  regurgitation.  Aortic Root: 3.6 cm.  Left Atrium: Normal left atrium.  LA volume index = 30  cc/m2.  Left Ventricle: Severe global left ventricular systolic  dysfunction.  Severe reversible diastolic dysfunction  (Stage III).  Right Heart: Normal right atrium. Normal right ventricular  size with decreased right ventricular systolic function. An  annuloplasty ring isseen in the tricuspid position.  Moderate tricuspid regurgitation.  Peak tricuspid valve  gradient equals 6 mm Hg, mean transmitral valve gradient  equals 2 mm Hg. Gradients measured at a HR of 88bpm.  Normal pulmonic valve. Mild-moderate pulmonic  regurgitation.  Pericardium/Pleura: Normal pericardium with no pericardial  effusion.  Hemodynamic: Estimated right ventricular systolic pressure  equals 25.36 - 29.36 mm Hg, assuming right atrial pressure  equals 6 - 10 mm Hg, consistent with normal pulmonary  hypertension. Color Doppler demonstrates no evidence of a  patent foramen ovale.  ------------------------------------------------------------------------  Conclusions:  1. Thickened mitral valve. Tethered mitral valve leaflets  with normal opening. Moderate mitral regurgitation. Peak  mitral valve gradient equals 6 mm Hg, mean transmitral  valve gradient equals 2 mm Hg, consistent with mild mitral  stenosis.  2. Calcified aortic valve which remains closed on all  observed beats.  Mild continuous aortic regurgitation.  3. Severe global left ventricular systolic dysfunction.  4. A Heartmate 2 LVAD at a sped of 9200RPM is present. The  aortic valve is closed.  There is mild continuious AI.  The  septum is midline. Pulsitile flow measuring 0.8m/s is  measured in to the LVAD inflow cannular  5. Normal right ventricular size with decreased right  ventricular systolic function.  6. An annuloplasty ring is seen in the tricuspid position.  Moderate tricuspid regurgitation.  Peak tricuspid valve  gradient equals 6 mm Hg, mean transmitral valve gradient  equals 2 mm Hg. Gradients measured at a HR of 88bpm.  7. Normal pulmonic valve. Mild-moderate pulmonic  regurgitation.  *** Compared with echocardiogram of 2022, the LVEDD is  smaller in the current study.  Laminar flow into the LVAD  is now seen.  ------------------------------------------------------------------------  Confirmed on  3/2/2022 - 11:39:36 by Justino Nichols M.D.  ------------------------------------------------------------------------ (22 @ 07:27)    ======================================================  PAST MEDICAL & SURGICAL HISTORY:  CHF (congestive heart failure)    CAD (coronary artery disease)    Depression    Pleural effusion    History of  novel coronavirus disease (COVID-19)  2020    Hemorrhoids    Bleeding hemorrhoids    Peripheral arterial disease    Claudication    BPH with urinary obstruction    ACC/AHA stage D systolic heart failure    Anticoagulation goal of INR 2.0 to 2.5    Falls    Clavicle fracture    CKD (chronic kidney disease), stage III    Iron deficiency anemia    H/O epistaxis    Vertigo    GI bleed    S/P TVR (tricuspid valve repair)    S/P ventricular assist device    S/P endoscopy    H/O tracheostomy      ====================ASSESSMENT ==============  65M PMH LVAD, recurrent serratia bacteremia, pneumonia, intubated/trach x2 cycles, chronic gall bladder disease s/p percutaneous cholecystectomy, SMA ischemia s/p stent, cachexia who presented from [facility] for septic shock    Plan:  ====================== NEUROLOGY=====================  AMS  - per nursing home staff pt has been altered for one week  - pt AAOx0, not verbally responding to staff  - pt following commands  - continue to monitor neuro status  - CTH at OSH negative for acute pathology   -  CTH: Mild to mod white matter microvascular ischemic disease. No acute pathology   - unable to get Brain MRI 2/2 LVAD. consider repeat CTH in a few days if AMS unchanges  - neuro following  - ongoing vEEG to r/o seizures  - no s/s of any subclinical seizures, continue to monitor   - Will rpt CTH with pan-scan       sertraline 100 milliGRAM(s) Oral daily    ==================== RESPIRATORY======================  Hypercarbia  - Pt hypercarbic at OSH, placed on BiPAP, now weaned off  - appears comfortable on NC 3L this AM  - continue to monitor respiratory status   - wean NC as tolerated with goal SpO2 >94%    Hx trach w/ stoma  - Pt trach x2 cycles in past  - most recently in setting of COVID PNA in 2022  - Pt decannulated after admission  - purulent drainage in stoma site, cultured pending results  - ENT states no sign of infection at Stoma site- Plan for possible TE fistula closure when pt is stable as per ENT      ====================CARDIOVASCULAR==================  ACC/AHA stage D systolic heart failure s/p LVAD  - pt appeared hypovolemic on admission  - weaned off vaso gtt at 10pm 3/ s/p fluid resuscitation  - Pt off pressors at the moment  - central sat sent from intro 81.8 with negative lactate x4  - LVAD coordinator and HF attending contacted     Ventricular Tachycardia  - pt has history of VT on previous admissions, takes home mexiletine  - significant NSVT and VT seen on tele in CICU overnight and this AM  - s/p lido bolus 100mg and 50mg and started on lido gtt restarted and holding home Mexiletine as per EP  - F/U further final EP recs       lidocaine   Infusion 1 mG/Min (7.5 mL/Hr) IV Continuous <Continuous>  vasopressin Infusion 0.04 Unit(s)/Min (2.4 mL/Hr) IV Continuous <Continuous>  ===================HEMATOLOGIC/ONC ===================  Anemia  - Hgb on admission 7.4 with subsequent drop to 6.6  - s/p 2u PRBC . Now h/h has been stable >48hrs. Will check cbc q12-24  - of note, pt has hx of GI bleeds  - no blood noted in BM, continue to monitor   - transfuse as needed if hgb <7      ===================== RENAL =========================  RASHAWN, now resolved   - continue to monitor SCr, BUN, lytes, and I&Os  - replete lytes prn with goal K 4-4.5 and mg >2      ==================== GASTROINTESTINAL===================  parHx GI bleed in past req multiple transfusions   - no overt bleeding noted on this admission   - monitor BM for signs of blood. h/h stable for past 24 hrs without any blood noted in BM  - c/w PPI  - continue with TF as tolerated for goal rate of 40mL  - Bowel regimen with Senna    cholecalciferol 1000 Unit(s) Oral daily  metoclopramide 10 milliGRAM(s) Oral four times a day  multivitamin 1 Tablet(s) Oral daily  G prophylaxis, pantoprazole  Injectable 40 milliGRAM(s) IV Push daily  senna 2 Tablet(s) Oral at bedtime    =======================    ENDOCRINE  =====================  Hx Hyperthyroidism   - continue home methimazole     dextrose 40% Gel 15 Gram(s) Oral once  dextrose 50% Injectable 25 Gram(s) IV Push once  glucagon  Injectable 1 milliGRAM(s) IntraMuscular once  insulin lispro (ADMELOG) corrective regimen sliding scale   SubCutaneous every 6 hours  methimazole 10 milliGRAM(s) Oral daily      ========================INFECTIOUS DISEASE================  Septic shock  - pt arrived tachycardic 120s, hypotensive MAPs 50-60, AMS  - WBC 22.54  - pt got zosyn and vancomycin x1 at OSH. switched from zosyn to cefepime. s/p vanco-, d/c yesterday as BCX with serratia.   - f/u infectious workup. BCX  gram neg rods x4 bottles-- growing serratia. pending sensitives   - f/u stoma culture sent   - CT C/A/P at OSH: cholecystostomy in place, patchy opacities vs. atelectasis as per written transfer report (CD and official report not sent with transfer)   - OSH BCX prelim gram neg rods in anaerobic bottle. continue to follow [facility] (BCX taken prior to abx initiation)  - ID following- recommending repeat CT C/A/P w/ con to look for source       cefepime   IVPB 1000 milliGRAM(s) IV Intermittent every 12 hours      Patient requires continuous monitoring with bedside rhythm monitoring, pulse ox monitoring, and intermittent blood gas analysis. Care plan discussed with ICU care team. Patient remained critical and at risk for life threatening decompensation.  Patient seen, examined and plan discussed with CCU team during rounds.     I have personally provided 35 minutes of critical care time excluding time spent on separate procedures, in addition to initial critical care time provided by the CICU Attending, Dr. Jensen .     By signing my name below, I, Raquel Barone, attest that this documentation has been prepared under the direction and in the presence of Tamie Vázquez  Electronically signed: Romel Schmitt, 22 @ 20:18    I, Tamie Vázquez, personally performed the services described in this documentation. all medical record entries made by the romel were at my direction and in presence. I have reviewed the chart and agree that the record reflects my personal performance and is accurate and complete  Electronically signed: Tamie Vázquez       RICKY HAMPTON  MRN-[id number]  Patient is a 66y old  Male who presents with a chief complaint of Septic shock (02 Mar 2022 14:11)    HPI:  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 s/p HM2 LVAD in 2017 at  (due to severe PAD) with TV ring, multiple GI bleeds, thus maintained off AC, CKD 3prior COVID-19 infection in 2020, recurrent syncope post LVAD s/p ILR, s/p prolonged complex hospitalization from -2021 initially admitted with abdominal pain complicated by GIB, respiratory failure s/p trach, multiple infections, s/p cholecystotomy tube and SMA stent 10/2021, ultimately discharged to [facility] rehab and then returned to [facility] for trach decannulation , readmitted in 2022 with recurrent COVID-19 infection, initially in distributive shock. Blood cultures were also positive for serratia marcescens which he has had in the past.     Pt was transferred to [facility] CICU from [facility]. He was sent to their ED from a rehab center due to AMS and tachycardia. At [facility], they noted a WBC greater than 20, tachycardia and hypercarbia on ABG. Pt was placed on BiPAP and transferred to [facility] CICU for concern for sepsis vs septic shock.      (2022 22:20)      24 HOUR EVENTS: no acute events    REVIEW OF SYSTEMS:    CONSTITUTIONAL: No weakness, fevers or chills  EYES/ENT: No visual changes;  No vertigo or throat pain   NECK: No pain or stiffness  RESPIRATORY: No cough, wheezing, hemoptysis; No shortness of breath  CARDIOVASCULAR: No chest pain or palpitations  GASTROINTESTINAL: No abdominal or epigastric pain. No nausea, vomiting, or hematemesis; No diarrhea or constipation. No melena or hematochezia.  GENITOURINARY: No dysuria, frequency or hematuria  NEUROLOGICAL: No numbness or weakness  SKIN: No itching, rashes      ICU Vital Signs Last 24 Hrs  T(C): 37.4 (02 Mar 2022 18:00), Max: 37.5 (02 Mar 2022 00:00)  T(F): 99.3 (02 Mar 2022 18:00), Max: 99.5 (02 Mar 2022 00:00)  HR: 101 (02 Mar 2022 19:00) (65 - 143)  BP: 93/70 (02 Mar 2022 10:00) (86/58 - 93/70)  BP(mean): 78 (02 Mar 2022 10:00) (68 - 78)  ABP: 85/72 (02 Mar 2022 19:00) (56/48 - 105/86)  ABP(mean): 78 (02 Mar 2022 19:00) (52 - 97)  RR: 30 (02 Mar 2022 19:00) (13 - 40)  SpO2: 97% (02 Mar 2022 19:00) (95% - 100%)      CVP(mm Hg): 4 (- @ 19:00) (0 - 13)    I&O's Summary    01 Mar 2022 07:01  -  02 Mar 2022 07:00  --------------------------------------------------------  IN: 2578.8 mL / OUT: 1442 mL / NET: 1136.8 mL    02 Mar 2022 07:01  -  02 Mar 2022 20:18  --------------------------------------------------------  IN: 764.1 mL / OUT: 514 mL / NET: 250.1 mL        CAPILLARY BLOOD GLUCOSE    CAPILLARY BLOOD GLUCOSE      POCT Blood Glucose.: 137 mg/dL (02 Mar 2022 17:21)        ============================I/O===========================   I&O's Detail    01 Mar 2022 07:01  -  02 Mar 2022 07:00  --------------------------------------------------------  IN:    Albumin 5%  - 250 mL: 1000 mL    Enteral Tube Flush: 230 mL    IV PiggyBack: 580 mL    IV PiggyBack: 600 mL    Jevity 1.2: 140 mL    Vasopressin: 21.6 mL    Vasopressin: 7.2 mL  Total IN: 2578.8 mL    OUT:    Drain (mL): 30 mL    Voided (mL): 1412 mL  Total OUT: 1442 mL    Total NET: 1136.8 mL      02 Mar 2022 07:01  -  02 Mar 2022 20:18  --------------------------------------------------------  IN:    Enteral Tube Flush: 240 mL    IV PiggyBack: 100 mL    Jevity 1.2: 320 mL    Lidocaine: 82.5 mL    Vasopressin: 8.4 mL    Vasopressin: 10.8 mL    Vasopressin: 2.4 mL  Total IN: 764.1 mL    OUT:    Drain (mL): 20 mL    Voided (mL): 494 mL  Total OUT: 514 mL    Total NET: 250.1 mL        ============================ LABS =========================                        8.9    22.54 )-----------( 85       ( 02 Mar 2022 04:00 )             28.2     03-02    138  |  107  |  15  ----------------------------<  120<H>  3.8   |  20<L>  |  0.94    Ca    8.9      02 Mar 2022 03:59  Phos  2.8     03-02  Mg     2.1     03-02    TPro  7.1  /  Alb  2.7<L>  /  TBili  0.4  /  DBili  x   /  AST  21  /  ALT  22  /  AlkPhos  120  03-02    Troponin T, High Sensitivity Result: 24 ng/L (22 @ 23:13)              LIVER FUNCTIONS - ( 02 Mar 2022 03:59 )  Alb: 2.7 g/dL / Pro: 7.1 g/dL / ALK PHOS: 120 U/L / ALT: 22 U/L / AST: 21 U/L / GGT: x           PT/INR - ( 02 Mar 2022 04:00 )   PT: 19.1 sec;   INR: 1.64 ratio         PTT - ( 02 Mar 2022 04:00 )  PTT:30.7 sec  ABG - ( 02 Mar 2022 03:55 )  pH, Arterial: 7.35  pH, Blood: x     /  pCO2: 37    /  pO2: 172   / HCO3: 20    / Base Excess: -4.8  /  SaO2: 99.3              Blood Gas Arterial, Lactate: 0.6 mmol/L (22 @ 03:55)  Blood Gas Arterial, Lactate: 1.2 mmol/L (22 @ 23:11)  Lactate, Blood: 1.0 mmol/L (22 @ 17:38)  Blood Gas Arterial, Lactate: 1.1 mmol/L (22 @ 10:43)  Blood Gas Arterial, Lactate: 0.9 mmol/L (22 @ 06:12)  Blood Gas Arterial, Lactate: 0.8 mmol/L (22 @ 00:05)  Blood Gas Arterial, Lactate: 0.8 mmol/L (22 @ 20:55)    Urinalysis Basic - ( 01 Mar 2022 00:42 )    Color: Yellow / Appearance: Clear / S.022 / pH: x  Gluc: x / Ketone: Small  / Bili: Negative / Urobili: Negative   Blood: x / Protein: 30 mg/dL / Nitrite: Negative   Leuk Esterase: Moderate / RBC: 199 /hpf / WBC 22 /HPF   Sq Epi: x / Non Sq Epi: 3 /hpf / Bacteria: Negative      ======================Micro/Rad/Cardio=================  Telemtry: Reviewed   EKG: Reviewed  CXR: Reviewed  Culture: Reviewed   Echo: Transthoracic Echocardiogram:   Patient name: RICKY HAMPTON  YOB: 1955   Age: 66 (M)   MR#: [id number]  Study Date: 2022  [facility]  Study quality: Technically fair  Referring Physician: Jus Jensen MD  Blood Pressure: 96/81 mmHg  Height: 178 cm  Weight: 49 kg  BSA: 1.6 m2  ------------------------------------------------------------------------  PROCEDURE: Transthoracic echocardiogram with 2-D, M-Mode  and complete spectral and color flow Doppler.  INDICATION: Chronic ischemic heart disease, unspecified  (I25.9)  ------------------------------------------------------------------------  Dimensions:    Normal Values:  LA:     3.1    2.0 - 4.0 cm  Ao:     3.6    2.0 - 3.8 cm  SEPTUM: 1.1    0.6 - 1.2 cm  PWT: 1.1    0.6 - 1.1 cm  LVIDd:  4.1    3.0 - 5.6 cm  LVIDs:  3.9    1.8 - 4.0 cm  Derived variables:  LVMI: 95 g/m2  RWT: 0.53  Fractional short: 5 %  EF (Visual Estimate): 10 %  ------------------------------------------------------------------------  Observations:  Mitral Valve: Thickened mitral valve. Tethered mitral valve  leaflets with normal opening. Moderate mitral  regurgitation. Peak mitral valve gradient equals 6 mm Hg,  mean transmitral valve gradient equals 2 mm Hg, consistent  with mild mitral stenosis.  Aortic Valve/Aorta: Calcified aortic valve which remains  closed on all observed beats.  Mild continuous aortic  regurgitation.  Aortic Root: 3.6 cm.  Left Atrium: Normal left atrium.  LA volume index = 30  cc/m2.  Left Ventricle: Severe global left ventricular systolic  dysfunction.  Severe reversible diastolic dysfunction  (Stage III).  Right Heart: Normal right atrium. Normal right ventricular  size with decreased right ventricular systolic function. An  annuloplasty ring isseen in the tricuspid position.  Moderate tricuspid regurgitation.  Peak tricuspid valve  gradient equals 6 mm Hg, mean transmitral valve gradient  equals 2 mm Hg. Gradients measured at a HR of 88bpm.  Normal pulmonic valve. Mild-moderate pulmonic  regurgitation.  Pericardium/Pleura: Normal pericardium with no pericardial  effusion.  Hemodynamic: Estimated right ventricular systolic pressure  equals 25.36 - 29.36 mm Hg, assuming right atrial pressure  equals 6 - 10 mm Hg, consistent with normal pulmonary  hypertension. Color Doppler demonstrates no evidence of a  patent foramen ovale.  ------------------------------------------------------------------------  Conclusions:  1. Thickened mitral valve. Tethered mitral valve leaflets  with normal opening. Moderate mitral regurgitation. Peak  mitral valve gradient equals 6 mm Hg, mean transmitral  valve gradient equals 2 mm Hg, consistent with mild mitral  stenosis.  2. Calcified aortic valve which remains closed on all  observed beats.  Mild continuous aortic regurgitation.  3. Severe global left ventricular systolic dysfunction.  4. A Heartmate 2 LVAD at a sped of 9200RPM is present. The  aortic valve is closed.  There is mild continuious AI.  The  septum is midline. Pulsitile flow measuring 0.8m/s is  measured in to the LVAD inflow cannular  5. Normal right ventricular size with decreased right  ventricular systolic function.  6. An annuloplasty ring is seen in the tricuspid position.  Moderate tricuspid regurgitation.  Peak tricuspid valve  gradient equals 6 mm Hg, mean transmitral valve gradient  equals 2 mm Hg. Gradients measured at a HR of 88bpm.  7. Normal pulmonic valve. Mild-moderate pulmonic  regurgitation.  *** Compared with echocardiogram of 2022, the LVEDD is  smaller in the current study.  Laminar flow into the LVAD  is now seen.  ------------------------------------------------------------------------  Confirmed on  3/2/2022 - 11:39:36 by Justino Nichols M.D.  ------------------------------------------------------------------------ (22 @ 07:27)    ======================================================  PAST MEDICAL & SURGICAL HISTORY:  CHF (congestive heart failure)    CAD (coronary artery disease)    Depression    Pleural effusion    History of  novel coronavirus disease (COVID-19)  2020    Hemorrhoids    Bleeding hemorrhoids    Peripheral arterial disease    Claudication    BPH with urinary obstruction    ACC/AHA stage D systolic heart failure    Anticoagulation goal of INR 2.0 to 2.5    Falls    Clavicle fracture    CKD (chronic kidney disease), stage III    Iron deficiency anemia    H/O epistaxis    Vertigo    GI bleed    S/P TVR (tricuspid valve repair)    S/P ventricular assist device    S/P endoscopy    H/O tracheostomy      ====================ASSESSMENT ==============  65M PMH LVAD, recurrent serratia bacteremia, pneumonia, intubated/trach x2 cycles, chronic gall bladder disease s/p percutaneous cholecystectomy, SMA ischemia s/p stent, cachexia who presented from [facility] for septic shock    Plan:  ====================== NEUROLOGY=====================  AMS  - per nursing home staff pt has been altered for one week  - pt AAOx0, not verbally responding to staff  - pt following commands  - continue to monitor neuro status  - CTH at OSH negative for acute pathology   -  CTH: Mild to mod white matter microvascular ischemic disease. No acute pathology   - unable to get Brain MRI 2/2 LVAD. consider repeat CTH in a few days if AMS unchanges  - neuro following  - ongoing vEEG to r/o seizures  - no s/s of any subclinical seizures, continue to monitor   - Will rpt CTH with pan-scan       sertraline 100 milliGRAM(s) Oral daily    ==================== RESPIRATORY======================  Hypercarbia  - Pt hypercarbic at OSH, placed on BiPAP, now weaned off  - appears comfortable on NC 3L this AM  - continue to monitor respiratory status   - wean NC as tolerated with goal SpO2 >94%    Hx trach w/ stoma  - Pt trach x2 cycles in past  - most recently in setting of COVID PNA in 2022  - Pt decannulated after admission  - purulent drainage in stoma site, cultured pending results  - ENT states no sign of infection at Stoma site- Plan for possible TE fistula closure when pt is stable as per ENT      ====================CARDIOVASCULAR==================  ACC/AHA stage D systolic heart failure s/p LVAD  - pt appeared hypovolemic on admission  - weaned off vaso gtt at 10pm 3/ s/p fluid resuscitation  - Pt off pressors at the moment  - central sat sent from intro 81.8 with negative lactate x4  - LVAD coordinator and HF attending contacted     Ventricular Tachycardia  - pt has history of VT on previous admissions, takes home mexiletine  - significant NSVT and VT seen on tele in CICU overnight and this AM  - s/p lido bolus 100mg and 50mg and started on lido gtt restarted and holding home Mexiletine as per EP  - F/U further final EP recs       lidocaine   Infusion 1 mG/Min (7.5 mL/Hr) IV Continuous <Continuous>  vasopressin Infusion 0.04 Unit(s)/Min (2.4 mL/Hr) IV Continuous <Continuous>  ===================HEMATOLOGIC/ONC ===================  Anemia  - Hgb on admission 7.4 with subsequent drop to 6.6  - s/p 2u PRBC . Now h/h has been stable >48hrs. Will check cbc q12-24  - of note, pt has hx of GI bleeds  - no blood noted in BM, continue to monitor   - transfuse as needed if hgb <7      ===================== RENAL =========================  RASHAWN, now resolved   - continue to monitor SCr, BUN, lytes, and I&Os  - replete lytes prn with goal K 4-4.5 and mg >2      ==================== GASTROINTESTINAL===================  parHx GI bleed in past req multiple transfusions   - no overt bleeding noted on this admission   - monitor BM for signs of blood. h/h stable for past 24 hrs without any blood noted in BM  - c/w PPI  - continue with TF as tolerated for goal rate of 40mL  - Bowel regimen with Senna    cholecalciferol 1000 Unit(s) Oral daily  metoclopramide 10 milliGRAM(s) Oral four times a day  multivitamin 1 Tablet(s) Oral daily  G prophylaxis, pantoprazole  Injectable 40 milliGRAM(s) IV Push daily  senna 2 Tablet(s) Oral at bedtime    =======================    ENDOCRINE  =====================  Hx Hyperthyroidism   - continue home methimazole     dextrose 40% Gel 15 Gram(s) Oral once  dextrose 50% Injectable 25 Gram(s) IV Push once  glucagon  Injectable 1 milliGRAM(s) IntraMuscular once  insulin lispro (ADMELOG) corrective regimen sliding scale   SubCutaneous every 6 hours  methimazole 10 milliGRAM(s) Oral daily      ========================INFECTIOUS DISEASE================  Septic shock  - pt arrived tachycardic 120s, hypotensive MAPs 50-60, AMS  - WBC 22.54  - pt got zosyn and vancomycin x1 at OSH. switched from zosyn to cefepime. s/p vanco-, d/c yesterday as BCX with serratia.   - f/u infectious workup. BCX  gram neg rods x4 bottles-- growing serratia. pending sensitives   - f/u stoma culture sent   - CT C/A/P at OSH: cholecystostomy in place, patchy opacities vs. atelectasis as per written transfer report (CD and official report not sent with transfer)   - OSH BCX prelim gram neg rods in anaerobic bottle. continue to follow [facility] (BCX taken prior to abx initiation)  - ID following- recommending repeat CT C/A/P w/ con to look for source       cefepime   IVPB 1000 milliGRAM(s) IV Intermittent every 12 hours      Patient requires continuous monitoring with bedside rhythm monitoring, pulse ox monitoring, and intermittent blood gas analysis. Care plan discussed with ICU care team. Patient remained critical and at risk for life threatening decompensation.  Patient seen, examined and plan discussed with CCU team during rounds.     I have personally provided 35 minutes of critical care time excluding time spent on separate procedures, in addition to initial critical care time provided by the CICU Attending, Dr. Jensen .     By signing my name below, I, Raquel Barone, attest that this documentation has been prepared under the direction and in the presence of Tamie Vázquez  Electronically signed: Cesia Schmitt, 22 @ 20:18    I, Tamie Vázquez, personally performed the services described in this documentation. all medical record entries made by the luisibmel were at my direction and in presence. I have reviewed the chart and agree that the record reflects my personal performance and is accurate and complete  Electronically signed: Tamie Pearl Fellow Attestation   Continuing lido gtt overnight, vEEG monitoring to determine his AMS

## 2022-03-02 NOTE — PROGRESS NOTE ADULT - SUBJECTIVE AND OBJECTIVE BOX
RICKY JOINT  MRN#: [id number]  Subjective: pulmonary progress note  : acute hypercapnic respiratory failure . Septic shock , service rendered on 2002   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 s/p HM2 LVAD in 2017 at  (due to severe PAD) with TV ring, multiple GI bleeds, thus maintained off AC, CKD 3prior COVID-19 infection in 2020, recurrent syncope post LVAD s/p ILR, s/p prolonged complex hospitalization from -2021 initially admitted with abdominal pain complicated by GIB, respiratory failure s/p trach, multiple infections, s/p cholecystotomy tube and SMA stent 10/2021, ultimately discharged to [facility] and then returned to [facility] for trach decannulation , readmitted in 2022 with recurrent COVID-19 infection, initially in distributive shock. Blood cultures were also positive for serratia marcescens which he has had in the past.   continue on 3 liter nasal 02 and vasopressin, family meeting with Palliative care tomorrow           (2022 22:20)    PAST MEDICAL & SURGICAL HISTORY:  CHF (congestive heart failure)    CAD (coronary artery disease)  Depression    Pleural effusion    History of 2019 novel coronavirus disease (COVID-19)  2020    Hemorrhoids    Bleeding hemorrhoids    Peripheral arterial disease    Claudication    BPH with urinary obstruction    ACC/AHA stage D systolic heart failure    Anticoagulation goal of INR 2.0 to 2.5    Falls    Clavicle fracture    CKD (chronic kidney disease), stage III    Iron deficiency anemia    H/O epistaxis    Vertigo    GI bleed    S/P TVR (tricuspid valve repair)    S/P ventricular assist device    S/P endoscopy    H/O tracheostomy        FAMILY HISTORY:    Social History:    Marital Status:  X    (   ) Single    (   )    (  )   Occupation: Retired   Lives with: (  ) alone  (  ) children  X spouse   (  ) parents  (  ) other    Substance Use (street drugs): X never used  (  ) other:  Tobacco Usage: X never smoked   (   ) former smoker   (   ) current smoker  (     ) pack year  (    ) last cigarette date  Alcohol Usage: Social         OBJECTIVE:  ICU Vital Signs Last 24 Hrs  T(C): 37.2 (01 Mar 2022 16:00), Max: 38.4 (01 Mar 2022 09:00)  T(F): 99 (01 Mar 2022 16:00), Max: 101.1 (01 Mar 2022 09:00)  HR: 109 (01 Mar 2022 18:00) (67 - 124)  BP: --  BP(mean): 86 (2022 20:00) (86 - 86)  ABP: 89/74 (01 Mar 2022 18:00) (75/63 - 160/85)  ABP(mean): 81 (01 Mar 2022 18:00) (68 - 139)  RR: 38 (01 Mar 2022 18:00) (5 - 40)  SpO2: 99% (01 Mar 2022 18:00) (94% - 100%)       @ 07: @ 07:00  --------------------------------------------------------  IN: 1060.7 mL / OUT: 1600 mL / NET: -539.3 mL     @ 07: @ 18:22  --------------------------------------------------------  IN: 677.8 mL / OUT: 455 mL / NET: 222.8 mL    PHYSICAL EXAM :Daily Height in cm: 177.8 (2022 21:20)  Elderly frail mail weaned off  positive pressure non invasive ventilator I/E ratio is14/5 on 3 liter nasal cannula on vasopressin    Daily Weight in k.7 (2022 21:20)  HEENT:     + NCAT  + EOMI  - Conjuctival edema   - Icterus   - Thrush   - ETT  - NGT/OGT  Neck:         + FROM RT IJ  JVD     - Nodes     - Masses    + Mid-line trachea   - Tracheostomy  Chest:           mild kyphosis   Lungs:          + CTA  + Rhonchi  + Rales    - Wheezing  + Decreased BS   - Dullness R L  Cardiac:       + S1 + S2    + RRR   - Irregular   - S3  - S4    - Murmurs   - Rub   - Hamman’s sign   Abdomen:    + BS     + Soft    + Non-tender  - Distended  - Organomegaly  - PEG Cholecystostomy tube in place   Extremities:   - Cyanosis U/L   - Clubbing  U/L  - LE/UE Edema   + Capillary refill    + Pulses   Neuro:        + Awake   +  Alert   - Confused   - Lethargic   - Sedated   - Generalized Weakness  Skin:        - Rashes    - Erythema   + Normal incisions   + IV sites intact  -      HOSPITAL MEDICATIONS: All mediciations reviewed and analyzed  MEDICATIONS  (STANDING):  albumin human  5% IVPB 250 milliLiter(s) IV Intermittent once  cefepime   IVPB 2000 milliGRAM(s) IV Intermittent every 12 hours  chlorhexidine 2% Cloths 1 Application(s) Topical <User Schedule>  cholecalciferol 1000 Unit(s) Oral daily  dextrose 40% Gel 15 Gram(s) Oral once  dextrose 50% Injectable 25 Gram(s) IV Push once  gabapentin Solution 100 milliGRAM(s) Oral three times a day  glucagon  Injectable 1 milliGRAM(s) IntraMuscular once  insulin lispro (ADMELOG) corrective regimen sliding scale   SubCutaneous every 6 hours  magnesium sulfate  IVPB 1 Gram(s) IV Intermittent once  methimazole 10 milliGRAM(s) Oral daily  metoclopramide 10 milliGRAM(s) Oral four times a day  mexiletine 150 milliGRAM(s) Oral every 8 hours  mirtazapine 7.5 milliGRAM(s) Oral at bedtime  multivitamin 1 Tablet(s) Oral daily  pantoprazole  Injectable 40 milliGRAM(s) IV Push daily  potassium phosphate IVPB 15 milliMole(s) IV Intermittent once  senna 2 Tablet(s) Oral at bedtime  sertraline 100 milliGRAM(s) Oral daily  vancomycin  IVPB 750 milliGRAM(s) IV Intermittent every 24 hours  vasopressin Infusion 0.02 Unit(s)/Min (1.2 mL/Hr) IV Continuous <Continuous>    MEDICATIONS  (PRN):    LABS: All Lab data reviewed and analyzed                         8.9    22.54 )-----------( 85       ( 02 Mar 2022 04:00 )             28.2    03-02    138  |  107  |  15  ----------------------------<  120<H>  3.8   |  20<L>  |  0.94    Ca    8.9      02 Mar 2022 03:59  Phos  2.8     03-02  Mg     2.1     03-02    TPro  7.1  /  Alb  2.7<L>  /  TBili  0.4  /  DBili  x   /  AST  21  /  ALT  22  /  AlkPhos  120  03-         PTT - ( 01 Mar 2022 00:08 )  PTT:25.0 sec LIVER FUNCTIONS - ( 01 Mar 2022 17:38 )  Alb: 2.9 g/dL / Pro: 7.3 g/dL / ALK PHOS: 116 U/L / ALT: 30 U/L / AST: 29 U/L / GGT: x           RADIOLOGY: - Reviewed and analyzed

## 2022-03-02 NOTE — CHART NOTE - NSCHARTNOTEFT_GEN_A_CORE
EEG reviewed until ~1653    No seizures recorded.    Final report to be completed at the completion of the study tomorrow morning EEG reviewed until ~1653    No seizures recorded.    Final report to be completed at the completion of the study tomorrow morning.

## 2022-03-02 NOTE — PROGRESS NOTE ADULT - PROBLEM SELECTOR PLAN 2
- Continue with ASA   - Continue with Toprol XL 12.5 daily per HF.  - Pain management continue with Tramadol for pain per HF  - Daily LDH   - Continue with AC therapy on coumadin for a therapeutic INR goal 2-3  - Increase activity as tolerated   - D/C plan Rehab once medically cleared; plan of care discussed with attending Price (Use Numbers Only, No Special Characters Or $): 75 Post-Care Instructions: I reviewed with the patient in detail post-care instructions. Patient is to wear sunprotection, and avoid picking at any of the treated lesions. Pt may apply Vaseline to crusted or scabbing areas. Consent: The patient's consent was obtained including but not limited to risks of crusting, scabbing, blistering, scarring, darker or lighter pigmentary change, recurrence, incomplete removal and infection. Render The Number Of Extractions: Yes Anesthesia Volume In Cc: 0 Detail Level: Detailed

## 2022-03-02 NOTE — PROGRESS NOTE ADULT - ATTENDING COMMENTS
Patient seen and examined with Dr Hall    I agree with his interval history exam and plans as noted above    Patient with poor mental status  non interacitve  serratia bacteremia recurrent in the setting of suspected LVAD device infection  continue Cefe[paige  mental status changes for head CT scan to assess for intra cranial event    Morris Prater MD  [phone]  After 5pm/weekends [phone]

## 2022-03-02 NOTE — DIETITIAN INITIAL EVALUATION ADULT. - PERTINENT LABORATORY DATA
03-02 Na138 mmol/L Glu 120 mg/dL<H> K+ 3.8 mmol/L Cr  0.94 mg/dL BUN 15 mg/dL Phos 2.8 mg/dL Alb 2.7 g/dL<L> PAB n/a

## 2022-03-02 NOTE — PROGRESS NOTE ADULT - SUBJECTIVE AND OBJECTIVE BOX
RICKY HAMPTON  MRN-[id number]  Patient is a 66y old  Male who presents with a chief complaint of Septic shock (01 Mar 2022 21:51)    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 s/p HM2 LVAD in 2017 at  (due to severe PAD) with TV ring, multiple GI bleeds, thus maintained off AC, CKD 3prior COVID-19 infection in 2020, recurrent syncope post LVAD s/p ILR, s/p prolonged complex hospitalization from -2021 initially admitted with abdominal pain complicated by GIB, respiratory failure s/p trach, multiple infections, s/p cholecystotomy tube and SMA stent 10/2021, ultimately discharged to [facility] rehab and then returned to [facility] for trach decannulation , readmitted in 2022 with recurrent COVID-19 infection, initially in distributive shock. Blood cultures were also positive for serratia marcescens which he has had in the past.     Pt was transferred to [facility] CICU from [facility]. He was sent to their ED from a rehab center due to AMS and tachycardia. At [facility], they noted a WBC greater than 20, tachycardia and hypercarbia on ABG. Pt was placed on BiPAP and transferred to [facility] CICU for concern for sepsis vs septic shock.      (2022 22:20)      Hospital course:    Today:    Physical Exam:  Vital Signs Last 24 Hrs  T(C): 36.5 (02 Mar 2022 05:00), Max: 38.4 (01 Mar 2022 09:00)  T(F): 97.7 (02 Mar 2022 05:00), Max: 101.1 (01 Mar 2022 09:00)  HR: 75 (02 Mar 2022 06:00) (75 - 143)  BP: --  BP(mean): 78 (01 Mar 2022 19:30) (78 - 78)  RR: 13 (02 Mar 2022 06:00) (7 - 40)  SpO2: 100% (02 Mar 2022 06:00) (94% - 100%)    PHYSICAL EXAM:  Constitutional: Patient laying in bed, NAD  Head: Atraumatic, normocephalic  Eyes: No scleral icterus. PERRLA. EOMI  ENMT: Moist mucous membrane. Uvula midline  Neck: Supple, No JVD  Respiratory: CTA B/L. No wheezes, rales or rhonchi   Cardiovascular: S1/S2. No murmurs, rubs, or gallops.  Gastrointestinal: Nondistended, BSx4, soft, nontender.  Extremities: Moves all extremities. Warm, no edema, nontender  Vascular: 2+ DP/PT pulses B/L   Neurological: A&Ox3. Follows commands. No focal deficits.   Skin: No rashes on exposed skin     ============================I/O===========================   I&O's Detail    2022 07:  -  01 Mar 2022 07:00  --------------------------------------------------------  IN:    Albumin 5%  - 250 mL: 250 mL    Enteral Tube Flush: 170 mL    IV PiggyBack: 200 mL    Lidocaine: 52.5 mL    PRBCs (Packed Red Blood Cells): 300 mL    Vasopressin: 47.4 mL    Vasopressin: 40.8 mL  Total IN: 1060.7 mL    OUT:    Drain (mL): 135 mL    Voided (mL): 1465 mL  Total OUT: 1600 mL    Total NET: -539.3 mL      01 Mar 2022 07:01  -  02 Mar 2022 06:51  --------------------------------------------------------  IN:    Albumin 5%  - 250 mL: 750 mL    Enteral Tube Flush: 230 mL    IV PiggyBack: 575 mL    IV PiggyBack: 600 mL    Jevity 1.2: 120 mL    Vasopressin: 21.6 mL    Vasopressin: 7.2 mL  Total IN: 2303.8 mL    OUT:    Drain (mL): 30 mL    Voided (mL): 1370 mL  Total OUT: 1400 mL    Total NET: 903.8 mL        ============================ LABS =========================                        8.9    22.54 )-----------( 85       ( 02 Mar 2022 04:00 )             28.2     03-02    138  |  107  |  15  ----------------------------<  120<H>  3.8   |  20<L>  |  0.94    Ca    8.9      02 Mar 2022 03:59  Phos  2.8     03-02  Mg     2.1     03-02    TPro  7.1  /  Alb  2.7<L>  /  TBili  0.4  /  DBili  x   /  AST  21  /  ALT  22  /  AlkPhos  120  03-02    LIVER FUNCTIONS - ( 02 Mar 2022 03:59 )  Alb: 2.7 g/dL / Pro: 7.1 g/dL / ALK PHOS: 120 U/L / ALT: 22 U/L / AST: 21 U/L / GGT: x           PT/INR - ( 02 Mar 2022 04:00 )   PT: 19.1 sec;   INR: 1.64 ratio         PTT - ( 02 Mar 2022 04:00 )  PTT:30.7 sec  ABG - ( 02 Mar 2022 03:55 )  pH, Arterial: 7.35  pH, Blood: x     /  pCO2: 37    /  pO2: 172   / HCO3: 20    / Base Excess: -4.8  /  SaO2: 99.3              Urinalysis Basic - ( 01 Mar 2022 00:42 )    Color: Yellow / Appearance: Clear / S.022 / pH: x  Gluc: x / Ketone: Small  / Bili: Negative / Urobili: Negative   Blood: x / Protein: 30 mg/dL / Nitrite: Negative   Leuk Esterase: Moderate / RBC: 199 /hpf / WBC 22 /HPF   Sq Epi: x / Non Sq Epi: 3 /hpf / Bacteria: Negative      ======================Micro/Rad/Cardio=================  Culture: Reviewed   CXR: Reviewed  Echo:Reviewed  ======================================================  PAST MEDICAL & SURGICAL HISTORY:  CHF (congestive heart failure)    CAD (coronary artery disease)    Depression    Pleural effusion    History of  novel coronavirus disease (COVID-19)  2020    Hemorrhoids    Bleeding hemorrhoids    Peripheral arterial disease    Claudication    BPH with urinary obstruction    ACC/AHA stage D systolic heart failure    Anticoagulation goal of INR 2.0 to 2.5    Falls    Clavicle fracture    CKD (chronic kidney disease), stage III    Iron deficiency anemia    H/O epistaxis    Vertigo    GI bleed    S/P TVR (tricuspid valve repair)    S/P ventricular assist device    S/P endoscopy    H/O tracheostomy      ====================ASSESSMENT ==============  65M PMH LVAD, recurrent serratia bacteremia, pneumonia, intubated/trach x2 cycles, chronic gall bladder disease s/p percutaneous cholecystectomy, SMA ischemia s/p stent, cachexia who presented from [facility] for septic shock    Plan:  ====================== NEUROLOGY=====================  AMS  - per nursing home staff pt has been altered for one week  - pt AAOx0, not verbally responding to staff  - pt following commands  - continue to monitor neuro status  - CTH at OSH negative for acute pathology   -  CTH: Mild to mod white matter microvascular ischemic disease. No acute pathology   - unable to get Brain MRI /2 LVAD. consider repeat CTH in a few days if AMS unchanges  - consider neuro consult and vEEG    ==================== RESPIRATORY======================  Hypercarbia  - Pt hypercarbic at OSH, placed on BiPAP, now weaned off  - appears comfortable on NC 3L this AM  - continue to monitor respiratory status   - wean NC as tolerated with goal SpO2 >94%    Hx trach w/ stoma  - Pt trach x2 cycles in past  - most recently in setting of COVID PNA in 2022  - Pt decannulated after admission  - purulent drainage in stoma site, cultured pending results  - ENT states no sign of infection at Stoma site- Plan for possible TE fistula closure when pt is stable as per ENT    ====================CARDIOVASCULAR==================  ACC/AHA stage D systolic heart failure s/p LVAD  - pt appeared hypovolemic on admission  - weaned off vaso gtt at 10pm 3/ s/p fluid resusitation, continue to monitor invasive pressures  - central sat sent from intro 81.8 with negative lactate x4  - LVAD coordinator and HF attending contacted     Ventricular Tachycardia  - pt has history of VT on previous admissions, takes home mexiletine  - NSVT seen on tele in CICU  - lido gtt d/c  without significant ectopy noted on tele. Restarted on home mexiletine as now with NGT    ===================HEMATOLOGIC/ONC ===================  Anemia  - Hgb on admission 7.4 with subsequent drop to 6.6  - s/p 2u PRBC . Now h/h has been stable >48hrs. Will check cbc q12-24  - of note, pt has hx of GI bleeds  - no blood noted in BM, continue to monitor   - transfuse as needed if hgb <7      ===================== RENAL =========================  RASHAWN, now resolved   - continue to monitor SCr, BUN, lytes, and I&Os  - replete lytes prn with goal K 4-4.5 and mg >2      ==================== GASTROINTESTINAL===================\  Hx GI bleed in past req multiple transfusions   - no overt bleeding noted on this admission   - monitor BM for signs of blood. h/h stable for past 24 hrs without any blood noted in BM  - c/w PPI  - continue with TF as tolerated for goal rate of 40mL    =======================    ENDOCRINE  =====================  Hx Hyperthyroidism   - continue home methimazole     methimazole 10 milliGRAM(s) Oral daily    ========================INFECTIOUS DISEASE================  Septic shock  - pt arrived tachycardic 120s, hypotensive MAPs 50-60, AMS  - WBC 27  - pt got zosyn and vancomycin x1 at OSH. switched from zosyn to cefepime. s/p vanco-, d/c yesterday as BCX with serratia.   - f/u infectious workup. BCX  gram neg rods x4 bottles-- growing serratia. pending sensitives   - f/u stoma culture sent   - CT C/A/P at OSH: cholecystostomy in place, patchy opacities vs. atelectasis as per written transfer report (CD and official report not sent with transfer)   - OSH BCX prelim gram neg rods in anaerobic bottle. continue to follow [facility] (BCX taken prior to abx initiation)  - ID following- recommending repeat CT C/A/P w/ con to look for source           Patient requires continuous monitoring with bedside rhythm monitoring, arterial line, pulse oximetry, ventilator monitoring and intermittent blood gas analysis.  Care plan discussed with ICU care team.  Patient remained critical; required more than usual post op care; I have spent >45 minutes providing non-routine post op care, in addition to intial critical time provided by CICU attending Dr. Jensen , re-evaluated multiple times during the day.         RICKY HAMPTON  MRN-[id number]  Patient is a 66y old  Male who presents with a chief complaint of Septic shock (01 Mar 2022 21:51)    HPI:  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 s/p HM2 LVAD in 2017 at  (due to severe PAD) with TV ring, multiple GI bleeds, thus maintained off AC, CKD 3prior COVID-19 infection in 2020, recurrent syncope post LVAD s/p ILR, s/p prolonged complex hospitalization from -2021 initially admitted with abdominal pain complicated by GIB, respiratory failure s/p trach, multiple infections, s/p cholecystotomy tube and SMA stent 10/2021, ultimately discharged to [facility] rehab and then returned to [facility] for trach decannulation , readmitted in 2022 with recurrent COVID-19 infection, initially in distributive shock. Blood cultures were also positive for serratia marcescens which he has had in the past.     Pt was transferred to [facility] CICU from [facility]. He was sent to their ED from a rehab center due to AMS and tachycardia. At [facility], they noted a WBC greater than 20, tachycardia and hypercarbia on ABG. Pt was placed on BiPAP and transferred to [facility] CICU for concern for sepsis vs septic shock.      (2022 22:20)    Today:  Vanco d/c overnight as per ID  CTA c/a/p ordered as per ID  Overnight with freq NSVT and VT so lido 100mg bolus given and started on lido gtt     Physical Exam:  Vital Signs Last 24 Hrs  T(C): 36.5 (02 Mar 2022 05:00), Max: 38.4 (01 Mar 2022 09:00)  T(F): 97.7 (02 Mar 2022 05:00), Max: 101.1 (01 Mar 2022 09:00)  HR: 75 (02 Mar 2022 06:00) (75 - 143)  BP: --  BP(mean): 78 (01 Mar 2022 19:30) (78 - 78)  RR: 13 (02 Mar 2022 06:00) (7 - 40)  SpO2: 100% (02 Mar 2022 06:00) (94% - 100%)    Constitutional: Patient laying in bed, NAD  Head: Atraumatic, normocephalic  Eyes: No scleral icterus. PERRLA.  ENMT: Dry mucous membrane. Uvula midline. Stoma  dry appearing  Neck: Supple, No JVD  Respiratory: CTA B/L although overall decreased breath sounds 2/2 poor effort. No wheezes, rales or rhonchi   Cardiovascular: LVAD hum present   Gastrointestinal: Nondistended, BSx4, soft, nontender.  Extremities: Moves all extremities. Warm, no edema, nontender  Vascular: 2+ DP/PT pulses B/L   Neurological: Awake, aphasic, not answering questions. Follows commands. B/L weakness in lower and upper extremities   Skin: No rashes on exposed skin     ============================I/O===========================   I&O's Detail    2022 07:01  -  01 Mar 2022 07:00  --------------------------------------------------------  IN:    Albumin 5%  - 250 mL: 250 mL    Enteral Tube Flush: 170 mL    IV PiggyBack: 200 mL    Lidocaine: 52.5 mL    PRBCs (Packed Red Blood Cells): 300 mL    Vasopressin: 47.4 mL    Vasopressin: 40.8 mL  Total IN: 1060.7 mL    OUT:    Drain (mL): 135 mL    Voided (mL): 1465 mL  Total OUT: 1600 mL    Total NET: -539.3 mL      01 Mar 2022 07:  -  02 Mar 2022 06:51  --------------------------------------------------------  IN:    Albumin 5%  - 250 mL: 750 mL    Enteral Tube Flush: 230 mL    IV PiggyBack: 575 mL    IV PiggyBack: 600 mL    Jevity 1.2: 120 mL    Vasopressin: 21.6 mL    Vasopressin: 7.2 mL  Total IN: 2303.8 mL    OUT:    Drain (mL): 30 mL    Voided (mL): 1370 mL  Total OUT: 1400 mL    Total NET: 903.8 mL        ============================ LABS =========================                        8.9    22.54 )-----------( 85       ( 02 Mar 2022 04:00 )             28.2     03-02    138  |  107  |  15  ----------------------------<  120<H>  3.8   |  20<L>  |  0.94    Ca    8.9      02 Mar 2022 03:59  Phos  2.8     03-02  Mg     2.1     03-02    TPro  7.1  /  Alb  2.7<L>  /  TBili  0.4  /  DBili  x   /  AST  21  /  ALT  22  /  AlkPhos  120  03-02    LIVER FUNCTIONS - ( 02 Mar 2022 03:59 )  Alb: 2.7 g/dL / Pro: 7.1 g/dL / ALK PHOS: 120 U/L / ALT: 22 U/L / AST: 21 U/L / GGT: x           PT/INR - ( 02 Mar 2022 04:00 )   PT: 19.1 sec;   INR: 1.64 ratio         PTT - ( 02 Mar 2022 04:00 )  PTT:30.7 sec  ABG - ( 02 Mar 2022 03:55 )  pH, Arterial: 7.35  pH, Blood: x     /  pCO2: 37    /  pO2: 172   / HCO3: 20    / Base Excess: -4.8  /  SaO2: 99.3              Urinalysis Basic - ( 01 Mar 2022 00:42 )    Color: Yellow / Appearance: Clear / S.022 / pH: x  Gluc: x / Ketone: Small  / Bili: Negative / Urobili: Negative   Blood: x / Protein: 30 mg/dL / Nitrite: Negative   Leuk Esterase: Moderate / RBC: 199 /hpf / WBC 22 /HPF   Sq Epi: x / Non Sq Epi: 3 /hpf / Bacteria: Negative      ======================Micro/Rad/Cardio=================  Culture: Reviewed   CXR: Reviewed  Echo:Reviewed  ======================================================  PAST MEDICAL & SURGICAL HISTORY:  CHF (congestive heart failure)    CAD (coronary artery disease)    Depression    Pleural effusion    History of  novel coronavirus disease (COVID-19)  2020    Hemorrhoids    Bleeding hemorrhoids    Peripheral arterial disease    Claudication    BPH with urinary obstruction    ACC/AHA stage D systolic heart failure    Anticoagulation goal of INR 2.0 to 2.5    Falls    Clavicle fracture    CKD (chronic kidney disease), stage III    Iron deficiency anemia    H/O epistaxis    Vertigo    GI bleed    S/P TVR (tricuspid valve repair)    S/P ventricular assist device    S/P endoscopy    H/O tracheostomy      ====================ASSESSMENT ==============  65M PMH LVAD, recurrent serratia bacteremia, pneumonia, intubated/trach x2 cycles, chronic gall bladder disease s/p percutaneous cholecystectomy, SMA ischemia s/p stent, cachexia who presented from [facility] for septic shock    Plan:  ====================== NEUROLOGY=====================  AMS  - per nursing home staff pt has been altered for one week  - pt AAOx0, not verbally responding to staff  - pt following commands  - continue to monitor neuro status  - CTH at OSH negative for acute pathology   -  CTH: Mild to mod white matter microvascular ischemic disease. No acute pathology   - unable to get Brain MRI  LVAD. consider repeat CTH in a few days if AMS unchanges  - consider neuro consult and vEEG    ==================== RESPIRATORY======================  Hypercarbia  - Pt hypercarbic at OSH, placed on BiPAP, now weaned off  - appears comfortable on NC 3L this AM  - continue to monitor respiratory status   - wean NC as tolerated with goal SpO2 >94%    Hx trach w/ stoma  - Pt trach x2 cycles in past  - most recently in setting of COVID PNA in 2022  - Pt decannulated after admission  - purulent drainage in stoma site, cultured pending results  - ENT states no sign of infection at Stoma site- Plan for possible TE fistula closure when pt is stable as per ENT    ====================CARDIOVASCULAR==================  ACC/AHA stage D systolic heart failure s/p LVAD  - pt appeared hypovolemic on admission  - weaned off vaso gtt at 10pm 3/ s/p fluid resusitation, continue to monitor invasive pressures  - central sat sent from intro 81.8 with negative lactate x4  - LVAD coordinator and HF attending contacted     Ventricular Tachycardia  - pt has history of VT on previous admissions, takes home mexiletine  - NSVT seen on tele in CICU  - lido gtt d/c  without significant ectopy noted on tele. Restarted on home mexiletine as now with NGT    ===================HEMATOLOGIC/ONC ===================  Anemia  - Hgb on admission 7.4 with subsequent drop to 6.6  - s/p 2u PRBC . Now h/h has been stable >48hrs. Will check cbc q12-24  - of note, pt has hx of GI bleeds  - no blood noted in BM, continue to monitor   - transfuse as needed if hgb <7      ===================== RENAL =========================  RASHAWN, now resolved   - continue to monitor SCr, BUN, lytes, and I&Os  - replete lytes prn with goal K 4-4.5 and mg >2      ==================== GASTROINTESTINAL===================\  Hx GI bleed in past req multiple transfusions   - no overt bleeding noted on this admission   - monitor BM for signs of blood. h/h stable for past 24 hrs without any blood noted in BM  - c/w PPI  - continue with TF as tolerated for goal rate of 40mL    =======================    ENDOCRINE  =====================  Hx Hyperthyroidism   - continue home methimazole     methimazole 10 milliGRAM(s) Oral daily    ========================INFECTIOUS DISEASE================  Septic shock  - pt arrived tachycardic 120s, hypotensive MAPs 50-60, AMS  - WBC 27  - pt got zosyn and vancomycin x1 at OSH. switched from zosyn to cefepime. s/p vanco-, d/c yesterday as BCX with serratia.   - f/u infectious workup. BCX  gram neg rods x4 bottles-- growing serratia. pending sensitives   - f/u stoma culture sent   - CT C/A/P at OSH: cholecystostomy in place, patchy opacities vs. atelectasis as per written transfer report (CD and official report not sent with transfer)   - OSH BCX prelim gram neg rods in anaerobic bottle. continue to follow [facility] (BCX taken prior to abx initiation)  - ID following- recommending repeat CT C/A/P w/ con to look for source           Patient requires continuous monitoring with bedside rhythm monitoring, arterial line, pulse oximetry, ventilator monitoring and intermittent blood gas analysis.  Care plan discussed with ICU care team.  Patient remained critical; required more than usual post op care; I have spent >45 minutes providing non-routine post op care, in addition to intial critical time provided by CICU attending Dr. Jensen , re-evaluated multiple times during the day.         RICKY HAMPTON  MRN-[id number]  Patient is a 66y old  Male who presents with a chief complaint of Septic shock (01 Mar 2022 21:51)    HPI:  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 s/p HM2 LVAD in 2017 at  (due to severe PAD) with TV ring, multiple GI bleeds, thus maintained off AC, CKD 3prior COVID-19 infection in 2020, recurrent syncope post LVAD s/p ILR, s/p prolonged complex hospitalization from -2021 initially admitted with abdominal pain complicated by GIB, respiratory failure s/p trach, multiple infections, s/p cholecystotomy tube and SMA stent 10/2021, ultimately discharged to [facility] rehab and then returned to [facility] for trach decannulation , readmitted in 2022 with recurrent COVID-19 infection, initially in distributive shock. Blood cultures were also positive for serratia marcescens which he has had in the past.     Pt was transferred to [facility] CICU from [facility]. He was sent to their ED from a rehab center due to AMS and tachycardia. At [facility], they noted a WBC greater than 20, tachycardia and hypercarbia on ABG. Pt was placed on BiPAP and transferred to [facility] CICU for concern for sepsis vs septic shock.      (2022 22:20)    Today:  Vanco d/c overnight as per ID  CTA c/a/p ordered as per ID  Overnight with freq NSVT and VT so lido 100mg bolus given and started on lido gtt     Physical Exam:  Vital Signs Last 24 Hrs  T(C): 36.5 (02 Mar 2022 05:00), Max: 38.4 (01 Mar 2022 09:00)  T(F): 97.7 (02 Mar 2022 05:00), Max: 101.1 (01 Mar 2022 09:00)  HR: 75 (02 Mar 2022 06:00) (75 - 143)  BP: --  BP(mean): 78 (01 Mar 2022 19:30) (78 - 78)  RR: 13 (02 Mar 2022 06:00) (7 - 40)  SpO2: 100% (02 Mar 2022 06:00) (94% - 100%)    Constitutional: Patient laying in bed, NAD  Head: Atraumatic, normocephalic  Eyes: No scleral icterus. PERRLA.  ENMT: Dry mucous membrane. Uvula midline. Stoma  dry appearing  Neck: Supple, No JVD  Respiratory: CTA B/L although overall decreased breath sounds 2/2 poor effort. No wheezes, rales or rhonchi   Cardiovascular: LVAD hum present   Gastrointestinal: Nondistended, BSx4, soft, nontender.  Extremities: Moves all extremities. Warm, no edema, nontender  Vascular: 2+ DP/PT pulses B/L   Neurological: Awake, aphasic, not answering questions. Follows commands. B/L weakness in lower and upper extremities   Skin: No rashes on exposed skin     ============================I/O===========================   I&O's Detail    2022 07:01  -  01 Mar 2022 07:00  --------------------------------------------------------  IN:    Albumin 5%  - 250 mL: 250 mL    Enteral Tube Flush: 170 mL    IV PiggyBack: 200 mL    Lidocaine: 52.5 mL    PRBCs (Packed Red Blood Cells): 300 mL    Vasopressin: 47.4 mL    Vasopressin: 40.8 mL  Total IN: 1060.7 mL    OUT:    Drain (mL): 135 mL    Voided (mL): 1465 mL  Total OUT: 1600 mL    Total NET: -539.3 mL      01 Mar 2022 07:  -  02 Mar 2022 06:51  --------------------------------------------------------  IN:    Albumin 5%  - 250 mL: 750 mL    Enteral Tube Flush: 230 mL    IV PiggyBack: 575 mL    IV PiggyBack: 600 mL    Jevity 1.2: 120 mL    Vasopressin: 21.6 mL    Vasopressin: 7.2 mL  Total IN: 2303.8 mL    OUT:    Drain (mL): 30 mL    Voided (mL): 1370 mL  Total OUT: 1400 mL    Total NET: 903.8 mL        ============================ LABS =========================                        8.9    22.54 )-----------( 85       ( 02 Mar 2022 04:00 )             28.2     03-02    138  |  107  |  15  ----------------------------<  120<H>  3.8   |  20<L>  |  0.94    Ca    8.9      02 Mar 2022 03:59  Phos  2.8     03-02  Mg     2.1     03-02    TPro  7.1  /  Alb  2.7<L>  /  TBili  0.4  /  DBili  x   /  AST  21  /  ALT  22  /  AlkPhos  120  03-02    LIVER FUNCTIONS - ( 02 Mar 2022 03:59 )  Alb: 2.7 g/dL / Pro: 7.1 g/dL / ALK PHOS: 120 U/L / ALT: 22 U/L / AST: 21 U/L / GGT: x           PT/INR - ( 02 Mar 2022 04:00 )   PT: 19.1 sec;   INR: 1.64 ratio         PTT - ( 02 Mar 2022 04:00 )  PTT:30.7 sec  ABG - ( 02 Mar 2022 03:55 )  pH, Arterial: 7.35  pH, Blood: x     /  pCO2: 37    /  pO2: 172   / HCO3: 20    / Base Excess: -4.8  /  SaO2: 99.3              Urinalysis Basic - ( 01 Mar 2022 00:42 )    Color: Yellow / Appearance: Clear / S.022 / pH: x  Gluc: x / Ketone: Small  / Bili: Negative / Urobili: Negative   Blood: x / Protein: 30 mg/dL / Nitrite: Negative   Leuk Esterase: Moderate / RBC: 199 /hpf / WBC 22 /HPF   Sq Epi: x / Non Sq Epi: 3 /hpf / Bacteria: Negative      ======================Micro/Rad/Cardio=================  Culture: Reviewed   CXR: Reviewed  Echo:Reviewed  ======================================================  PAST MEDICAL & SURGICAL HISTORY:  CHF (congestive heart failure)    CAD (coronary artery disease)    Depression    Pleural effusion    History of  novel coronavirus disease (COVID-19)  2020    Hemorrhoids    Bleeding hemorrhoids    Peripheral arterial disease    Claudication    BPH with urinary obstruction    ACC/AHA stage D systolic heart failure    Anticoagulation goal of INR 2.0 to 2.5    Falls    Clavicle fracture    CKD (chronic kidney disease), stage III    Iron deficiency anemia    H/O epistaxis    Vertigo    GI bleed    S/P TVR (tricuspid valve repair)    S/P ventricular assist device    S/P endoscopy    H/O tracheostomy      ====================ASSESSMENT ==============  65M PMH LVAD, recurrent serratia bacteremia, pneumonia, intubated/trach x2 cycles, chronic gall bladder disease s/p percutaneous cholecystectomy, SMA ischemia s/p stent, cachexia who presented from [facility] for septic shock    Plan:  ====================== NEUROLOGY=====================  AMS  - per nursing home staff pt has been altered for one week  - pt AAOx0, not verbally responding to staff  - pt following commands  - continue to monitor neuro status  - CTH at OSH negative for acute pathology   -  CTH: Mild to mod white matter microvascular ischemic disease. No acute pathology   - unable to get Brain MRI  LVAD. consider repeat CTH in a few days if AMS unchanges  - consider neuro consult and vEEG    ==================== RESPIRATORY======================  Hypercarbia  - Pt hypercarbic at OSH, placed on BiPAP, now weaned off  - appears comfortable on NC 3L this AM  - continue to monitor respiratory status   - wean NC as tolerated with goal SpO2 >94%    Hx trach w/ stoma  - Pt trach x2 cycles in past  - most recently in setting of COVID PNA in 2022  - Pt decannulated after admission  - purulent drainage in stoma site, cultured pending results  - ENT states no sign of infection at Stoma site- Plan for possible TE fistula closure when pt is stable as per ENT    ====================CARDIOVASCULAR==================  ACC/AHA stage D systolic heart failure s/p LVAD  - pt appeared hypovolemic on admission  - weaned off vaso gtt at 10pm 3/1 s/p fluid resusitation. Had to be restarted on vaso gtt and started on levo gtt for hypotension associated with is arrythmia. Wean as tolerated with goal MAP 70  - central sat sent from intro 81.8 with negative lactate x4  - LVAD coordinator and HF attending contacted     Ventricular Tachycardia  - pt has history of VT on previous admissions, takes home mexiletine  - significant NSVT and VT seen on tele in CICU overnight and this AM  - s/p lido bolus 100mg and 50mg and started on lido gtt restarted and holding home Mexiletine as per EP  - F/U further final EP recs   - Pt hypotensive with ectopy, had to be restarted on vaso gtt and started on levo gtt    ===================HEMATOLOGIC/ONC ===================  Anemia  - Hgb on admission 7.4 with subsequent drop to 6.6  - s/p 2u PRBC . Now h/h has been stable >48hrs. Will check cbc q12-24  - of note, pt has hx of GI bleeds  - no blood noted in BM, continue to monitor   - transfuse as needed if hgb <7      ===================== RENAL =========================  RASHAWN, now resolved   - continue to monitor SCr, BUN, lytes, and I&Os  - replete lytes prn with goal K 4-4.5 and mg >2      ==================== GASTROINTESTINAL===================\  Hx GI bleed in past req multiple transfusions   - no overt bleeding noted on this admission   - monitor BM for signs of blood. h/h stable for past 24 hrs without any blood noted in BM  - c/w PPI  - continue with TF as tolerated for goal rate of 40mL    =======================    ENDOCRINE  =====================  Hx Hyperthyroidism   - continue home methimazole     methimazole 10 milliGRAM(s) Oral daily    ========================INFECTIOUS DISEASE================  Septic shock  - pt arrived tachycardic 120s, hypotensive MAPs 50-60, AMS  - WBC 27  - pt got zosyn and vancomycin x1 at OSH. switched from zosyn to cefepime. s/p vanco-, d/c yesterday as BCX with serratia.   - f/u infectious workup. BCX  gram neg rods x4 bottles-- growing serratia. pending sensitives   - f/u stoma culture sent   - CT C/A/P at OSH: cholecystostomy in place, patchy opacities vs. atelectasis as per written transfer report (CD and official report not sent with transfer)   - OSH BCX prelim gram neg rods in anaerobic bottle. continue to follow [facility] (BCX taken prior to abx initiation)  - ID following- recommending repeat CT C/A/P w/ con to look for source           Patient requires continuous monitoring with bedside rhythm monitoring, arterial line, pulse oximetry, ventilator monitoring and intermittent blood gas analysis.  Care plan discussed with ICU care team.  Patient remained critical; required more than usual post op care; I have spent >45 minutes providing non-routine post op care, in addition to intial critical time provided by CICU attending Dr. Jensen , re-evaluated multiple times during the day.         RICKY HAMPTON  MRN-[id number]  Patient is a 66y old  Male who presents with a chief complaint of Septic shock (01 Mar 2022 21:51)    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 s/p HM2 LVAD in 2017 at  (due to severe PAD) with TV ring, multiple GI bleeds, thus maintained off AC, CKD 3prior COVID-19 infection in 2020, recurrent syncope post LVAD s/p ILR, s/p prolonged complex hospitalization from -2021 initially admitted with abdominal pain complicated by GIB, respiratory failure s/p trach, multiple infections, s/p cholecystotomy tube and SMA stent 10/2021, ultimately discharged to [facility] rehab and then returned to [facility] for trach decannulation , readmitted in 2022 with recurrent COVID-19 infection, initially in distributive shock. Blood cultures were also positive for serratia marcescens which he has had in the past.     Pt was transferred to [facility] CICU from [facility]. He was sent to their ED from a rehab center due to AMS and tachycardia. At [facility], they noted a WBC greater than 20, tachycardia and hypercarbia on ABG. Pt was placed on BiPAP and transferred to [facility] CICU for concern for sepsis vs septic shock.      (2022 22:20)    Today:  Vanco d/c overnight as per ID  CTA c/a/p ordered as per ID  Overnight with freq NSVT and VT so lido 100mg bolus given and started on lido gtt     Physical Exam:  Vital Signs Last 24 Hrs  T(C): 36.5 (02 Mar 2022 05:00), Max: 38.4 (01 Mar 2022 09:00)  T(F): 97.7 (02 Mar 2022 05:00), Max: 101.1 (01 Mar 2022 09:00)  HR: 75 (02 Mar 2022 06:00) (75 - 143)  BP: --  BP(mean): 78 (01 Mar 2022 19:30) (78 - 78)  RR: 13 (02 Mar 2022 06:00) (7 - 40)  SpO2: 100% (02 Mar 2022 06:00) (94% - 100%)    Constitutional: Patient laying in bed, NAD  Head: Atraumatic, normocephalic  Eyes: No scleral icterus. PERRLA.  ENMT: Dry mucous membrane. Uvula midline. Stoma  dry appearing  Neck: Supple, No JVD  Respiratory: CTA B/L although overall decreased breath sounds 2/2 poor effort. No wheezes, rales or rhonchi   Cardiovascular: LVAD hum present   Gastrointestinal: Nondistended, BSx4, soft, nontender.  Extremities: Moves all extremities. Warm, no edema, nontender  Vascular: 2+ DP/PT pulses B/L   Neurological: Awake, aphasic, not answering questions. Follows commands. B/L weakness in lower and upper extremities   Skin: No rashes on exposed skin     ============================I/O===========================   I&O's Detail    2022 07:01  -  01 Mar 2022 07:00  --------------------------------------------------------  IN:    Albumin 5%  - 250 mL: 250 mL    Enteral Tube Flush: 170 mL    IV PiggyBack: 200 mL    Lidocaine: 52.5 mL    PRBCs (Packed Red Blood Cells): 300 mL    Vasopressin: 47.4 mL    Vasopressin: 40.8 mL  Total IN: 1060.7 mL    OUT:    Drain (mL): 135 mL    Voided (mL): 1465 mL  Total OUT: 1600 mL    Total NET: -539.3 mL      01 Mar 2022 07:  -  02 Mar 2022 06:51  --------------------------------------------------------  IN:    Albumin 5%  - 250 mL: 750 mL    Enteral Tube Flush: 230 mL    IV PiggyBack: 575 mL    IV PiggyBack: 600 mL    Jevity 1.2: 120 mL    Vasopressin: 21.6 mL    Vasopressin: 7.2 mL  Total IN: 2303.8 mL    OUT:    Drain (mL): 30 mL    Voided (mL): 1370 mL  Total OUT: 1400 mL    Total NET: 903.8 mL        ============================ LABS =========================                        8.9    22.54 )-----------( 85       ( 02 Mar 2022 04:00 )             28.2     03-02    138  |  107  |  15  ----------------------------<  120<H>  3.8   |  20<L>  |  0.94    Ca    8.9      02 Mar 2022 03:59  Phos  2.8     03-02  Mg     2.1     03-02    TPro  7.1  /  Alb  2.7<L>  /  TBili  0.4  /  DBili  x   /  AST  21  /  ALT  22  /  AlkPhos  120  03-02    LIVER FUNCTIONS - ( 02 Mar 2022 03:59 )  Alb: 2.7 g/dL / Pro: 7.1 g/dL / ALK PHOS: 120 U/L / ALT: 22 U/L / AST: 21 U/L / GGT: x           PT/INR - ( 02 Mar 2022 04:00 )   PT: 19.1 sec;   INR: 1.64 ratio         PTT - ( 02 Mar 2022 04:00 )  PTT:30.7 sec  ABG - ( 02 Mar 2022 03:55 )  pH, Arterial: 7.35  pH, Blood: x     /  pCO2: 37    /  pO2: 172   / HCO3: 20    / Base Excess: -4.8  /  SaO2: 99.3              Urinalysis Basic - ( 01 Mar 2022 00:42 )    Color: Yellow / Appearance: Clear / S.022 / pH: x  Gluc: x / Ketone: Small  / Bili: Negative / Urobili: Negative   Blood: x / Protein: 30 mg/dL / Nitrite: Negative   Leuk Esterase: Moderate / RBC: 199 /hpf / WBC 22 /HPF   Sq Epi: x / Non Sq Epi: 3 /hpf / Bacteria: Negative      ======================Micro/Rad/Cardio=================  Culture: Reviewed   CXR: Reviewed  Echo:Reviewed  ======================================================  PAST MEDICAL & SURGICAL HISTORY:  CHF (congestive heart failure)    CAD (coronary artery disease)    Depression    Pleural effusion    History of 2019 novel coronavirus disease (COVID-19)  2020    Hemorrhoids    Bleeding hemorrhoids    Peripheral arterial disease    Claudication    BPH with urinary obstruction    ACC/AHA stage D systolic heart failure    Anticoagulation goal of INR 2.0 to 2.5    Falls    Clavicle fracture    CKD (chronic kidney disease), stage III    Iron deficiency anemia    H/O epistaxis    Vertigo    GI bleed    S/P TVR (tricuspid valve repair)    S/P ventricular assist device    S/P endoscopy    H/O tracheostomy      ====================ASSESSMENT ==============  65M PMH LVAD, recurrent serratia bacteremia, pneumonia, intubated/trach x2 cycles, chronic gall bladder disease s/p percutaneous cholecystectomy, SMA ischemia s/p stent, cachexia who presented from [facility] for septic shock    Plan:  ====================== NEUROLOGY=====================  AMS  - per nursing home staff pt has been altered for one week  - pt AAOx0, not verbally responding to staff  - pt following commands  - continue to monitor neuro status  - CTH at OSH negative for acute pathology   -  CTH: Mild to mod white matter microvascular ischemic disease. No acute pathology   - unable to get Brain MRI / LVAD. consider repeat CTH in a few days if AMS unchanges  - will consult neuro consult  - so s/s of any subclinical seizures, continue to monitor   - Will rpt CTH with pan-scan     ==================== RESPIRATORY======================  Hypercarbia  - Pt hypercarbic at OSH, placed on BiPAP, now weaned off  - appears comfortable on NC 3L this AM  - continue to monitor respiratory status   - wean NC as tolerated with goal SpO2 >94%    Hx trach w/ stoma  - Pt trach x2 cycles in past  - most recently in setting of COVID PNA in 2022  - Pt decannulated after admission  - purulent drainage in stoma site, cultured pending results  - ENT states no sign of infection at Stoma site- Plan for possible TE fistula closure when pt is stable as per ENT    ====================CARDIOVASCULAR==================  ACC/AHA stage D systolic heart failure s/p LVAD  - pt appeared hypovolemic on admission  - weaned off vaso gtt at 10pm 3/1 s/p fluid resusitation. Had to be restarted on vaso gtt and started on levo gtt for hypotension associated with is arrythmia. Wean as tolerated with goal MAP 70  - central sat sent from intro 81.8 with negative lactate x4  - LVAD coordinator and HF attending contacted     Ventricular Tachycardia  - pt has history of VT on previous admissions, takes home mexiletine  - significant NSVT and VT seen on tele in CICU overnight and this AM  - s/p lido bolus 100mg and 50mg and started on lido gtt restarted and holding home Mexiletine as per EP  - F/U further final EP recs   - Pt hypotensive with ectopy, had to be restarted on vaso gtt and started on levo gtt    ===================HEMATOLOGIC/ONC ===================  Anemia  - Hgb on admission 7.4 with subsequent drop to 6.6  - s/p 2u PRBC . Now h/h has been stable >48hrs. Will check cbc q12-24  - of note, pt has hx of GI bleeds  - no blood noted in BM, continue to monitor   - transfuse as needed if hgb <7      ===================== RENAL =========================  RASHAWN, now resolved   - continue to monitor SCr, BUN, lytes, and I&Os  - replete lytes prn with goal K 4-4.5 and mg >2      ==================== GASTROINTESTINAL===================\  Hx GI bleed in past req multiple transfusions   - no overt bleeding noted on this admission   - monitor BM for signs of blood. h/h stable for past 24 hrs without any blood noted in BM  - c/w PPI  - continue with TF as tolerated for goal rate of 40mL    =======================    ENDOCRINE  =====================  Hx Hyperthyroidism   - continue home methimazole     methimazole 10 milliGRAM(s) Oral daily    ========================INFECTIOUS DISEASE================  Septic shock  - pt arrived tachycardic 120s, hypotensive MAPs 50-60, AMS  - WBC 27  - pt got zosyn and vancomycin x1 at OSH. switched from zosyn to cefepime. s/p vanco-, d/c yesterday as BCX with serratia.   - f/u infectious workup. BCX  gram neg rods x4 bottles-- growing serratia. pending sensitives   - f/u stoma culture sent   - CT C/A/P at OSH: cholecystostomy in place, patchy opacities vs. atelectasis as per written transfer report (CD and official report not sent with transfer)   - OSH BCX prelim gram neg rods in anaerobic bottle. continue to follow [facility] (BCX taken prior to abx initiation)  - ID following- recommending repeat CT C/A/P w/ con to look for source           Patient requires continuous monitoring with bedside rhythm monitoring, arterial line, pulse oximetry, ventilator monitoring and intermittent blood gas analysis.  Care plan discussed with ICU care team.  Patient remained critical; required more than usual post op care; I have spent >45 minutes providing non-routine post op care, in addition to intial critical time provided by CICU attending Dr. Jensen , re-evaluated multiple times during the day.

## 2022-03-02 NOTE — PROGRESS NOTE ADULT - SUBJECTIVE AND OBJECTIVE BOX
Patient seen and examined at bedside.    Overnight Events:     This morning, tele showed sustained monomorphic VT -130s, hemodynamically stable     Patient lethargic, oriented x0     Current Meds:  cefepime   IVPB 1000 milliGRAM(s) IV Intermittent every 12 hours  chlorhexidine 2% Cloths 1 Application(s) Topical <User Schedule>  cholecalciferol 1000 Unit(s) Oral daily  dextrose 40% Gel 15 Gram(s) Oral once  dextrose 50% Injectable 25 Gram(s) IV Push once  gabapentin Solution 100 milliGRAM(s) Oral three times a day  glucagon  Injectable 1 milliGRAM(s) IntraMuscular once  insulin lispro (ADMELOG) corrective regimen sliding scale   SubCutaneous every 6 hours  lidocaine   Infusion 1 mG/Min IV Continuous <Continuous>  lidocaine 2% Syringe 50 milliGRAM(s) IV Push once  lidocaine 2% Syringe 100 milliGRAM(s) IV Push once  methimazole 10 milliGRAM(s) Oral daily  metoclopramide 10 milliGRAM(s) Oral four times a day  mexiletine 150 milliGRAM(s) Oral every 8 hours  midodrine 10 milliGRAM(s) Oral every 8 hours  mirtazapine 7.5 milliGRAM(s) Oral at bedtime  multivitamin 1 Tablet(s) Oral daily  norepinephrine Infusion 0.05 MICROgram(s)/kG/Min IV Continuous <Continuous>  pantoprazole  Injectable 40 milliGRAM(s) IV Push daily  senna 2 Tablet(s) Oral at bedtime  sertraline 100 milliGRAM(s) Oral daily  vasopressin Infusion 0.1 Unit(s)/Min IV Continuous <Continuous>      Vitals:  T(F): 96.8 (03-02), Max: 100.4 (03-01)  HR: 73 (03-02) (73 - 143)  BP: 89/71 (03-02) (86/58 - 89/71)  RR: 20 (03-02)  SpO2: 100% (03-02)  I&O's Summary    01 Mar 2022 07:01  -  02 Mar 2022 07:00  --------------------------------------------------------  IN: 2583.8 mL / OUT: 1442 mL / NET: 1141.8 mL    02 Mar 2022 07:01  -  02 Mar 2022 09:31  --------------------------------------------------------  IN: 77.4 mL / OUT: 84 mL / NET: -6.6 mL        Physical Exam:  Appearance: No acute distress; well appearing  Eyes: PERRL, EOMI, pink conjunctiva  HEENT: Normal oral mucosa  Cardiovascular: RRR, S1, S2, no murmurs, rubs, or gallops; no edema; no JVD  Respiratory: Clear to auscultation bilaterally  Gastrointestinal: soft, non-tender, non-distended with normal bowel sounds  Musculoskeletal: No clubbing; no joint deformity   Neurologic: Non-focal  Lymphatic: No lymphadenopathy  Psychiatry: AAOx3, mood & affect appropriate  Skin: No rashes, ecchymoses, or cyanosis                          8.9    22.54 )-----------( 85       ( 02 Mar 2022 04:00 )             28.2     03-02    138  |  107  |  15  ----------------------------<  120<H>  3.8   |  20<L>  |  0.94    Ca    8.9      02 Mar 2022 03:59  Phos  2.8     03-02  Mg     2.1     03-02    TPro  7.1  /  Alb  2.7<L>  /  TBili  0.4  /  DBili  x   /  AST  21  /  ALT  22  /  AlkPhos  120  03-02    PT/INR - ( 02 Mar 2022 04:00 )   PT: 19.1 sec;   INR: 1.64 ratio         PTT - ( 02 Mar 2022 04:00 )  PTT:30.7 sec  CARDIAC MARKERS ( 27 Feb 2022 23:13 )  24 ng/L / x     / x     / x     / x     / x          Serum Pro-Brain Natriuretic Peptide: 5486 pg/mL (02-27 @ 23:13)          New ECG(s): Personally reviewed    Echo:    Stress Testing:     Cath:    Imaging:    Interpretation of Telemetry:

## 2022-03-02 NOTE — CONSULT NOTE ADULT - ASSESSMENT
65yo man with PMH non-ischemic cardiomyopathy s/p LVAD, multiple GI bleeds, CKD, COVID-19 infections, recurrent serratia bacteremia presenting with 10 days of AMS; transferred to [facility] CICU from Health system due to sepsis. Currently stuporous and grimacing to painful stimuli. Consider infectious v metabolic v seizure etiology.    Plan:  Check Thiamine and Ammonia levels  EEG monitoring 67yo man with PMH non-ischemic cardiomyopathy s/p LVAD, multiple GI bleeds, CKD, COVID-19 infections, recurrent serratia bacteremia presenting with 10 days of AMS; transferred to [facility] CICU from [facility] due to sepsis. On exam, patient appears to have significant decreased level of conciousness, however, he resists active eye opening which is a good sign. Otherwise, non-localizing exam.     Impression: Decreased LOC in the setting of bacteremia likely secondary to diffuse cerebral dysfunction. Etiology uncertain, likely infectious encephelopathy. Will rule out superimposed seizures and other metabolic derangements.     Plan:  [] Routine EEG  [] Ammonia, thiamine  [] Repeat CTH w/o contrast     Case discussed w/ Dr. Teddy Blackburn

## 2022-03-02 NOTE — DIETITIAN INITIAL EVALUATION ADULT. - PHYSCIAL ASSESSMENT
66% IBW/underweight Skin per nursing flowsheets: R lower abdomen LVAD driveline site, tracheal stoma wound, R+L heel DTI, R buttocks DTI, sacral DTI

## 2022-03-02 NOTE — PROGRESS NOTE ADULT - SUBJECTIVE AND OBJECTIVE BOX
INFECTIOUS DISEASE FOLLOW UP NOTE:    Interval History/ROS: Patient is a 66y old  Male who presents with a chief complaint of Septic shock (02 Mar 2022 09:30)      Overnight events:    REVIEW OF SYSTEMS:  CONSTITUTIONAL: No unintentional weight loss; no weakness; no fevers or chills  HEENT: No decrease hearing, tinnitus, ear pain, rhinorrhea, congestion, or sore throat  RESPIRATORY: No cough, wheezing, hemoptysis; no shortness of breath/shortness of breath on exertion; no orthopnea  CARDIOVASCULAR: No chest pain or palpitations  GASTROINTESTINAL: No abdominal or epigastric pain; no change in appetite; no early satiety; no nausea, vomiting, or hematemesis; no diarrhea or constipation; no melena or hematochezia; no change of stool caliber  GENITOURINARY: No dysuria, increased in urinary frequency or hematuria; no urethral discharge  NEUROLOGICAL: No headache/dizziness; no focal numbness or motor weakness  MSK: No joint pain, erythema, or swelling; no back pain  SKIN: No itching, rashes; no recent insect bites  All other ROS negative except noted above    Prior hospital charts reviewed [Yes]  Primary team notes reviewed [Yes]  Other consultant notes reviewed [Yes]    Allergies:  Amiodarone Hydrochloride (Other (Severe))      ANTIMICROBIALS:   cefepime   IVPB 1000 every 12 hours      OTHER MEDS: MEDICATIONS  (STANDING):  dextrose 40% Gel 15 once  dextrose 50% Injectable 25 once  gabapentin Solution 100 three times a day  glucagon  Injectable 1 once  insulin lispro (ADMELOG) corrective regimen sliding scale  every 6 hours  lidocaine   Infusion 1 <Continuous>  lidocaine 2% Syringe 50 once  lidocaine 2% Syringe 100 once  methimazole 10 daily  metoclopramide 10 four times a day  mexiletine 150 every 8 hours  midodrine 10 every 8 hours  mirtazapine 7.5 at bedtime  norepinephrine Infusion 0.05 <Continuous>  pantoprazole  Injectable 40 daily  senna 2 at bedtime  sertraline 100 daily  vasopressin Infusion 0.1 <Continuous>      Vital Signs Last 24 Hrs  T(F): 96.8 (22 @ 08:00), Max: 101.1 (22 @ 09:00)    Vital Signs Last 24 Hrs  HR: 66 (22 @ 10:30) (65 - 143)  BP: 93/70 (22 @ 10:00) (86/58 - 93/70)  RR: 20 (22 @ 10:30)  SpO2: 100% (22 @ 10:30) (94% - 100%)  Wt(kg): --    EXAM:  GENERAL: NAD, lying in bed comfortably  HEAD: Atraumatic, Normocephalic  EYES: EOMI, PERRLA, conjunctiva pink and cornea white  ENT: Normal external ears and nose, no discharges; moist mucous membranes, no erythema on posterior oropharynx  NECK: Supple, nontender to palpation; no JVD  CHEST/LUNG: Clear to auscultation bilaterally; No rales, rhonchi, wheezing, or rubs. Unlabored respirations  HEART: Regular rate and rhythm; No murmurs, rubs, or gallops  ABDOMEN: Soft, nontender, nondistended; no rebound tenderness, no guarding; no hepatomegaly; normoactive/hyperactive/hypoactive bowel sounds  EXTREMITIES:  2+ Peripheral Pulses, brisk capillary refill. No clubbing, cyanosis, or petal edema  NERVOUS SYSTEM: Alert and oriented to person, time, place and situation, speech clear. No focal deficits   MSK: FROM all 4 extremities, full and equal strength; no joint erythema, swelling or pain  SKIN: No rashes or lesions, no superficial thrombophlebitis    Labs:                        8.9    22.54 )-----------( 85       ( 02 Mar 2022 04:00 )             28.2         138  |  107  |  15  ----------------------------<  120<H>  3.8   |  20<L>  |  0.94    Ca    8.9      02 Mar 2022 03:59  Phos  2.8       Mg     2.1         TPro  7.1  /  Alb  2.7<L>  /  TBili  0.4  /  DBili  x   /  AST  21  /  ALT  22  /  AlkPhos  120        WBC Trend:  WBC Count: 22.54 (22 @ 04:00)  WBC Count: 21.24 (22 @ 17:38)  WBC Count: 24.36 (22 @ 10:49)  WBC Count: 23.62 (22 @ 06:16)      Creatine Trend:  Creatinine, Serum: 0.94 ()  Creatinine, Serum: 0.97 ()  Creatinine, Serum: 1.01 ()  Creatinine, Serum: 0.98 ()      Liver Biochemical Testing Trend:  Alanine Aminotransferase (ALT/SGPT): 22 ()  Alanine Aminotransferase (ALT/SGPT): 30 ()  Alanine Aminotransferase (ALT/SGPT): 40 ()  Alanine Aminotransferase (ALT/SGPT): 47 *H* ()  Alanine Aminotransferase (ALT/SGPT): 60 *H* ()  Aspartate Aminotransferase (AST/SGOT): 21 (22 @ 03:59)  Aspartate Aminotransferase (AST/SGOT): 29 (22 @ 17:38)  Aspartate Aminotransferase (AST/SGOT): 58 (22 @ 00:08)  Aspartate Aminotransferase (AST/SGOT): 82 (22 @ 15:47)  Aspartate Aminotransferase (AST/SGOT): 139 (22 @ 04:53)  Bilirubin Total, Serum: 0.4 ()  Bilirubin Total, Serum: 0.4 ()  Bilirubin Total, Serum: 0.5 ()  Bilirubin Total, Serum: 0.5 ()  Bilirubin Total, Serum: 0.5 ()      Trend LDH  22 @ 03:59  211  22 @ 00:08  199  22 @ 23:13  381<H>  22 @ 06:16  227  22 @ 06:14  254<H>      Urinalysis Basic - ( 01 Mar 2022 00:42 )    Color: Yellow / Appearance: Clear / S.022 / pH: x  Gluc: x / Ketone: Small  / Bili: Negative / Urobili: Negative   Blood: x / Protein: 30 mg/dL / Nitrite: Negative   Leuk Esterase: Moderate / RBC: 199 /hpf / WBC 22 /HPF   Sq Epi: x / Non Sq Epi: 3 /hpf / Bacteria: Negative        MICROBIOLOGY:  Vancomycin Level, Random: 8.5 ( @ 04:53)    MRSA PCR Result.: NotDetec (22 @ 04:25)  MRSA PCR Result.: NotDetec (21 @ 07:59)  MRSA PCR Result.: NotDetec (10-17-21 @ 21:55)  MRSA PCR Result.: NotDetec (21 @ 23:02)      Culture - Blood (collected 2022 04:26)  Source: .Blood Blood-Peripheral  Preliminary Report:    Growth in aerobic bottle: Gram Negative Rods    Culture - Blood (collected 2022 01:33)  Source: .Blood Blood-Peripheral  Preliminary Report:    Growth in aerobic bottle: Serratia marcescens    Growth in anaerobic bottle: Gram Negative Rods    ***Blood Panel PCR results on this specimen are available    approximately 3 hours after the Gram stain result.***    Gram stain, PCR, and/or culture resultsmay not always    correspond due to difference in methodologies.    ************************************************************    This PCR assay was performed by multiplex PCR. This    Assay tests for 66 bacterial and resistance gene targets.    Please referto the [facility] test directory    at [email or web address] for details.  Organism: Blood Culture PCR  Organism: Blood Culture PCR    Sensitivities:      -  Serratia marcescens: Detec      Method Type: PCR    Culture - Blood (collected 2022 10:22)  Source: .Blood Blood  Final Report:    No Growth Final    Culture - Blood (collected 2022 10:22)  Source: .Blood Blood  Final Report:    No Growth Final    Culture - Blood (collected 2022 22:53)  Source: .Blood Blood  Final Report:    No Growth Final    Culture - Blood (collected 2022 23:02)  Source: .Blood Blood-Venous  Final Report:    Growth in aerobic and anaerobic bottles: Serratia marcescens    ***Blood Panel PCR results on this specimen are available    approximately 3 hours after the Gram stain result.***    Gram stain, PCR, and/or culture results may not always    correspond due to difference in methodologies.    ************************************************************    This PCR assay was performed by multiplex PCR. This    Assay tests for 66 bacterial and resistance gene targets.    Please refer to the [facility] test directory    at [email or web address] for details.  Organism: Blood Culture PCR  Serratia marcescens  Organism: Serratia marcescens    Sensitivities:      -  Amikacin: S <=16      -  Ampicillin: R >16 These ampicillin results predict results for amoxicillin      -  Ampicillin/Sulbactam: R >16/8 Enterobacter, Klebsiella aerogenes, Citrobacter, and Serratia may develop resistance during prolonged therapy (3-4 days)      -  Aztreonam: S <=4      -  Cefazolin: R >16 Enterobacter, Klebsiella aerogenes, Citrobacter, and Serratia may develop resistance during prolonged therapy (3-4 days)      -  Cefepime: S <=2      -  Cefoxitin: R >16      -  Ceftriaxone: S <=1 Enterobacter, Klebsiella aerogenes, Citrobacter, and Serratia may develop resistance during prolonged therapy      -  Ciprofloxacin: R >2      -  Ertapenem: S <=0.5      -  Gentamicin: S <=2      -  Levofloxacin: R 2      -  Meropenem: S <=1      -  Piperacillin/Tazobactam: S <=8      -  Tobramycin: S <=2      -  Trimethoprim/Sulfamethoxazole: S 2/38      Method Type: CLAU  Organism: Blood Culture PCR    Sensitivities:      -  Serratia marcescens: Detec      Method Type: PCR    Culture - Blood (collected 30 Dec 2021 22:07)  Source: .Blood Blood  Final Report:    No Growth Final    Culture - Blood (collected 18 Dec 2021 08:15)  Source: .Blood Blood  Final Report:    No Growth Final    Culture - Blood (collected 13 Dec 2021 05:50)  Source: .Blood Arterial Catheter  Final Report:    Growth in aerobic and anaerobic bottles: Serratia marcescens  Organism: Serratia marcescens  Organism: Serratia marcescens    Sensitivities:      -  Amikacin: S <=16      -  Ampicillin: R >16 These ampicillin results predict results for amoxicillin      -  Ampicillin/Sulbactam: R >16/8 Enterobacter, Klebsiella aerogenes, Citrobacter, and Serratia may develop resistance during prolonged therapy (3-4 days)      -  Aztreonam: S <=4      -  Cefazolin: R >16 Enterobacter, Klebsiella aerogenes, Citrobacter, and Serratia may develop resistance during prolonged therapy (3-4 days)      -  Cefepime: S <=2      -  Cefoxitin: R >16      -  Ceftriaxone: S <=1 Enterobacter, Klebsiella aerogenes, Citrobacter, and Serratia may develop resistance during prolonged therapy      -  Ciprofloxacin: R >2      -  Ertapenem: S <=0.5      -  Gentamicin: S <=2      -  Levofloxacin: R 2      -  Meropenem: S <=1      -  Piperacillin/Tazobactam: S <=8      -  Tobramycin: S 4      -  Trimethoprim/Sulfamethoxazole: S       Method Type: CLAU    Culture - Blood (collected 13 Dec 2021 05:50)  Source: .Blood Blood-Peripheral  Final Report:    Growth in aerobic bottle: Serratia marcescens See previous culture    [id number]    ***Blood Panel PCR results on this specimen are available    approximately 3 hours after the Gram stain result.***    Gram stain, PCR, and/or culture results may not always    correspond due to difference in methodologies.    ************************************************************    This PCR assay was performed by multiplex PCR. This    Assay tests for 66 bacterial and resistance gene targets.    Please refer to the [facility] test directory    at [email or web address] for details.  Organism: Blood Culture PCR  Organism: Blood Culture PCR    Sensitivities:      -  Serratia marcescens: Detec      Method Type: PCR      HIV-1/2 Combo Result: Nonreact (21 @ 08:18)    ABS CD4: 129 /uL (20 @ 08:38)    Legionella Antigen, Urine: Negative ( @ 04:13)    COVID-19 PCR: NotDetec (22 @ 13:13)  COVID-19 PCR: NotDetec (22 @ 06:10)  COVID-19 PCR: NotDetec (22 @ 05:55)    COVID-19 Le[place] Domain AB Interp: Positive (21 @ 04:49)  COVID-19 Nucleocapsid PAT AB Interp: Positive (21 @ 04:49)  COVID-19 [place] Domain AB Interp: Positive (06-15-21 @ 10:15)    Procalcitonin, Serum: 6.34 ()  Procalcitonin, Serum: 10.93 ()      Ferritin, Serum: 1828 ()      Lactate Dehydrogenase, Serum: 211 ()  Lactate Dehydrogenase, Serum: 199 ()  Lactate Dehydrogenase, Serum: 381 ()    Serum Pro-Brain Natriuretic Peptide: 47827 ()  Serum Pro-Brain Natriuretic Peptide: 5486 ()    Troponin T, High Sensitivity Result: 24 ()    Blood Gas Arterial, Lactate: 0.6 ( @ 03:55)  Blood Gas Arterial, Lactate: 1.2 ( @ 23:11)  Lactate, Blood: 1.0 ( @ 17:38)  Blood Gas Arterial, Lactate: 1.1 ( @ 10:43)      RADIOLOGY:  imaging below personally reviewed    < from: Xray Chest 1 View- PORTABLE-Urgent (Xray Chest 1 View- PORTABLE-Urgent .) (22 @ 16:34) >  IMPRESSION:  Enteric tube, coursing below the diaphragm, with tip in the stomach.      < end of copied text >   INFECTIOUS DISEASE FOLLOW UP NOTE:    Interval History/ROS: Patient is a 66y old  Male who presents with a chief complaint of Septic shock (02 Mar 2022 09:30)    Overnight events: Patient remains afebrile and hemodynamically stable overnight. Minimally responsive. Slight tachypneic but has decreased oxygen requirement. Off pressor since this AM.    REVIEW OF SYSTEMS:  Unable to obtain due to cognitive impairment    Prior hospital charts reviewed [Yes]  Primary team notes reviewed [Yes]  Other consultant notes reviewed [Yes]    Allergies:  Amiodarone Hydrochloride (Other (Severe))      ANTIMICROBIALS:   cefepime   IVPB 1000 every 12 hours      OTHER MEDS: MEDICATIONS  (STANDING):  dextrose 40% Gel 15 once  dextrose 50% Injectable 25 once  gabapentin Solution 100 three times a day  glucagon  Injectable 1 once  insulin lispro (ADMELOG) corrective regimen sliding scale  every 6 hours  lidocaine   Infusion 1 <Continuous>  lidocaine 2% Syringe 50 once  lidocaine 2% Syringe 100 once  methimazole 10 daily  metoclopramide 10 four times a day  mexiletine 150 every 8 hours  midodrine 10 every 8 hours  mirtazapine 7.5 at bedtime  norepinephrine Infusion 0.05 <Continuous>  pantoprazole  Injectable 40 daily  senna 2 at bedtime  sertraline 100 daily  vasopressin Infusion 0.1 <Continuous>      Vital Signs Last 24 Hrs  T(F): 96.8 (22 @ 08:00), Max: 101.1 (22 @ 09:00)    Vital Signs Last 24 Hrs  HR: 66 (22 @ 10:30) (65 - 143)  BP: 93/70 (22 @ 10:00) (86/58 - 93/70)  RR: 20 (22 @ 10:30)  SpO2: 100% (22 @ 10:30) (94% - 100%)  Wt(kg): --    EXAM:  GENERAL: lying in bed, a little tachypneic   HEAD: Atraumatic  EYES: Conjunctiva pink and cornea white  ENT: Normal external ears and nose, no discharges; dry mucous membranes  NECK: Supple, nontender to palpation; stoma at previous trach site; dry secretion; RIJ central line  CHEST/LUNG: + bilateral air entry, no rhonchi  HEART: S1 S2  ABDOMEN: Soft, nontender, nondistended; normoactive bowel sounds; Perc dawn site no erythema or drainage, Drive line site is without erythema  EXTREMITIES: No clubbing, cyanosis, or petal edema  NERVOUS SYSTEM: minimally responsive, not alert and oriented. unable to follow commands. nonverbal  MSK: No joint erythema, swelling  SKIN: No rashes or lesions, no superficial thrombophlebitis  LINES: RIJ central line; Right axillary A line      Labs:                        8.9    22.54 )-----------( 85       ( 02 Mar 2022 04:00 )             28.2         138  |  107  |  15  ----------------------------<  120<H>  3.8   |  20<L>  |  0.94    Ca    8.9      02 Mar 2022 03:59  Phos  2.8       Mg     2.1         TPro  7.1  /  Alb  2.7<L>  /  TBili  0.4  /  DBili  x   /  AST  21  /  ALT  22  /  AlkPhos  120        WBC Trend:  WBC Count: 22.54 (22 @ 04:00)  WBC Count: 21.24 (22 @ 17:38)  WBC Count: 24.36 (22 @ 10:49)  WBC Count: 23.62 (22 @ 06:16)      Creatine Trend:  Creatinine, Serum: 0.94 ()  Creatinine, Serum: 0.97 ()  Creatinine, Serum: 1.01 ()  Creatinine, Serum: 0.98 ()      Liver Biochemical Testing Trend:  Alanine Aminotransferase (ALT/SGPT): 22 ()  Alanine Aminotransferase (ALT/SGPT): 30 ()  Alanine Aminotransferase (ALT/SGPT): 40 ()  Alanine Aminotransferase (ALT/SGPT): 47 *H* ()  Alanine Aminotransferase (ALT/SGPT): 60 *H* ()  Aspartate Aminotransferase (AST/SGOT): 21 (22 @ 03:59)  Aspartate Aminotransferase (AST/SGOT): 29 (22 @ 17:38)  Aspartate Aminotransferase (AST/SGOT): 58 (22 @ 00:08)  Aspartate Aminotransferase (AST/SGOT): 82 (22 @ 15:47)  Aspartate Aminotransferase (AST/SGOT): 139 (22 @ 04:53)  Bilirubin Total, Serum: 0.4 ()  Bilirubin Total, Serum: 0.4 ()  Bilirubin Total, Serum: 0.5 ()  Bilirubin Total, Serum: 0.5 ()  Bilirubin Total, Serum: 0.5 ()      Trend LDH  22 @ 03:59  211  22 @ 00:08  199  22 @ 23:13  381<H>  22 @ 06:16  227  22 @ 06:14  254<H>      Urinalysis Basic - ( 01 Mar 2022 00:42 )    Color: Yellow / Appearance: Clear / S.022 / pH: x  Gluc: x / Ketone: Small  / Bili: Negative / Urobili: Negative   Blood: x / Protein: 30 mg/dL / Nitrite: Negative   Leuk Esterase: Moderate / RBC: 199 /hpf / WBC 22 /HPF   Sq Epi: x / Non Sq Epi: 3 /hpf / Bacteria: Negative        MICROBIOLOGY:  Vancomycin Level, Random: 8.5 ( @ 04:53)    MRSA PCR Result.: NotDetec (22 @ 04:25)  MRSA PCR Result.: NotDetec (21 @ 07:59)  MRSA PCR Result.: NotDetec (10-17-21 @ 21:55)  MRSA PCR Result.: NotDetec (21 @ 23:02)      Culture - Blood (collected 2022 04:26)  Source: .Blood Blood-Peripheral  Preliminary Report:    Growth in aerobic bottle: Gram Negative Rods    Culture - Blood (collected 2022 01:33)  Source: .Blood Blood-Peripheral  Preliminary Report:    Growth in aerobic bottle: Serratia marcescens    Growth in anaerobic bottle: Gram Negative Rods    ***Blood Panel PCR results on this specimen are available    approximately 3 hours after the Gram stain result.***    Gram stain, PCR, and/or culture resultsmay not always    correspond due to difference in methodologies.    ************************************************************    This PCR assay was performed by multiplex PCR. This    Assay tests for 66 bacterial and resistance gene targets.    Please referto the [facility] test directory    at [email or web address] for details.  Organism: Blood Culture PCR  Organism: Blood Culture PCR    Sensitivities:      -  Serratia marcescens: Detec      Method Type: PCR    Culture - Blood (collected 2022 10:22)  Source: .Blood Blood  Final Report:    No Growth Final    Culture - Blood (collected 2022 10:22)  Source: .Blood Blood  Final Report:    No Growth Final    Culture - Blood (collected 2022 22:53)  Source: .Blood Blood  Final Report:    No Growth Final    Culture - Blood (collected 2022 23:02)  Source: .Blood Blood-Venous  Final Report:    Growth in aerobic and anaerobic bottles: Serratia marcescens    ***Blood Panel PCR results on this specimen are available    approximately 3 hours after the Gram stain result.***    Gram stain, PCR, and/or culture results may not always    correspond due to difference in methodologies.    ************************************************************    This PCR assay was performed by multiplex PCR. This    Assay tests for 66 bacterial and resistance gene targets.    Please refer to the [facility] test directory    at [email or web address] for details.  Organism: Blood Culture PCR  Serratia marcescens  Organism: Serratia marcescens    Sensitivities:      -  Amikacin: S <=16      -  Ampicillin: R >16 These ampicillin results predict results for amoxicillin      -  Ampicillin/Sulbactam: R >16/8 Enterobacter, Klebsiella aerogenes, Citrobacter, and Serratia may develop resistance during prolonged therapy (3-4 days)      -  Aztreonam: S <=4      -  Cefazolin: R >16 Enterobacter, Klebsiella aerogenes, Citrobacter, and Serratia may develop resistance during prolonged therapy (3-4 days)      -  Cefepime: S <=2      -  Cefoxitin: R >16      -  Ceftriaxone: S <=1 Enterobacter, Klebsiella aerogenes, Citrobacter, and Serratia may develop resistance during prolonged therapy      -  Ciprofloxacin: R >2      -  Ertapenem: S <=0.5      -  Gentamicin: S <=2      -  Levofloxacin: R 2      -  Meropenem: S <=1      -  Piperacillin/Tazobactam: S <=8      -  Tobramycin: S <=2      -  Trimethoprim/Sulfamethoxazole: S 38      Method Type: CLAU  Organism: Blood Culture PCR    Sensitivities:      -  Serratia marcescens: Detec      Method Type: PCR    Culture - Blood (collected 30 Dec 2021 22:07)  Source: .Blood Blood  Final Report:    No Growth Final    Culture - Blood (collected 18 Dec 2021 08:15)  Source: .Blood Blood  Final Report:    No Growth Final    Culture - Blood (collected 13 Dec 2021 05:50)  Source: .Blood Arterial Catheter  Final Report:    Growth in aerobic and anaerobic bottles: Serratia marcescens  Organism: Serratia marcescens  Organism: Serratia marcescens    Sensitivities:      -  Amikacin: S <=16      -  Ampicillin: R >16 These ampicillin results predict results for amoxicillin      -  Ampicillin/Sulbactam: R >16/8 Enterobacter, Klebsiella aerogenes, Citrobacter, and Serratia may develop resistance during prolonged therapy (3-4 days)      -  Aztreonam: S <=4      -  Cefazolin: R >16 Enterobacter, Klebsiella aerogenes, Citrobacter, and Serratia may develop resistance during prolonged therapy (3-4 days)      -  Cefepime: S <=2      -  Cefoxitin: R >16      -  Ceftriaxone: S <=1 Enterobacter, Klebsiella aerogenes, Citrobacter, and Serratia may develop resistance during prolonged therapy      -  Ciprofloxacin: R >2      -  Ertapenem: S <=0.5      -  Gentamicin: S <=2      -  Levofloxacin: R 2      -  Meropenem: S <=1      -  Piperacillin/Tazobactam: S <=8      -  Tobramycin: S 4      -  Trimethoprim/Sulfamethoxazole: S 2/38      Method Type: CLAU    Culture - Blood (collected 13 Dec 2021 05:50)  Source: .Blood Blood-Peripheral  Final Report:    Growth in aerobic bottle: Serratia marcescens See previous culture    [id number]    ***Blood Panel PCR results on this specimen are available    approximately 3 hours after the Gram stain result.***    Gram stain, PCR, and/or culture results may not always    correspond due to difference in methodologies.    ************************************************************    This PCR assay was performed by multiplex PCR. This    Assay tests for 66 bacterial and resistance gene targets.    Please refer to the [facility] test directory    at [email or web address] for details.  Organism: Blood Culture PCR  Organism: Blood Culture PCR    Sensitivities:      -  Serratia marcescens: Detec      Method Type: PCR      HIV-1/2 Combo Result: Nonreact (21 @ 08:18)    ABS CD4: 129 /uL (20 @ 08:38)    Legionella Antigen, Urine: Negative ( @ 04:13)    COVID-19 PCR: NotDetec (22 @ 13:13)  COVID-19 PCR: NotDetec (22 @ 06:10)  COVID-19 PCR: NotDetec (22 @ 05:55)    COVID-19 Le[place] Domain AB Interp: Positive (21 @ 04:49)  COVID-19 Nucleocapsid PAT AB Interp: Positive (21 @ 04:49)  COVID-19 [place] Domain AB Interp: Positive (06-15-21 @ 10:15)    Procalcitonin, Serum: 6.34 ()  Procalcitonin, Serum: 10.93 ()      Ferritin, Serum: 1828 ()      Lactate Dehydrogenase, Serum: 211 ()  Lactate Dehydrogenase, Serum: 199 ()  Lactate Dehydrogenase, Serum: 381 ()    Serum Pro-Brain Natriuretic Peptide: 55128 ()  Serum Pro-Brain Natriuretic Peptide: 5486 ()    Troponin T, High Sensitivity Result: 24 ()    Blood Gas Arterial, Lactate: 0.6 ( @ 03:55)  Blood Gas Arterial, Lactate: 1.2 ( @ 23:11)  Lactate, Blood: 1.0 ( @ 17:38)  Blood Gas Arterial, Lactate: 1.1 ( @ 10:43)      RADIOLOGY:  imaging below personally reviewed    < from: Xray Chest 1 View- PORTABLE-Urgent (Xray Chest 1 View- PORTABLE-Urgent .) (22 @ 16:34) >  IMPRESSION:  Enteric tube, coursing below the diaphragm, with tip in the stomach.      < end of copied text >

## 2022-03-02 NOTE — DIETITIAN INITIAL EVALUATION ADULT. - CHIEF COMPLAINT
65M PMH LVAD, recurrent serratia bacteremia, pneumonia, intubated/trach x2 cycles, chronic gall bladder disease s/p percutaneous cholecystectomy, SMA ischemia s/p stent, cachexia who presented from [facility] for septic shock

## 2022-03-02 NOTE — DIETITIAN INITIAL EVALUATION ADULT. - PERTINENT MEDS FT
MEDICATIONS  (STANDING):  cefepime   IVPB 1000 milliGRAM(s) IV Intermittent every 12 hours  chlorhexidine 2% Cloths 1 Application(s) Topical <User Schedule>  cholecalciferol 1000 Unit(s) Oral daily  dextrose 40% Gel 15 Gram(s) Oral once  dextrose 50% Injectable 25 Gram(s) IV Push once  glucagon  Injectable 1 milliGRAM(s) IntraMuscular once  insulin lispro (ADMELOG) corrective regimen sliding scale   SubCutaneous every 6 hours  lidocaine   Infusion 1 mG/Min (7.5 mL/Hr) IV Continuous <Continuous>  methimazole 10 milliGRAM(s) Oral daily  metoclopramide 10 milliGRAM(s) Oral four times a day  multivitamin 1 Tablet(s) Oral daily  pantoprazole  Injectable 40 milliGRAM(s) IV Push daily  senna 2 Tablet(s) Oral at bedtime  sertraline 100 milliGRAM(s) Oral daily    MEDICATIONS  (PRN):

## 2022-03-02 NOTE — PROGRESS NOTE ADULT - ASSESSMENT
66 years old male with PMHx of Stage D HF 2/2 NICM s/p HM2 LVAD in 9/2017 at  (due to severe PAD) with TV ring, multiple GI bleeds, no AC, CKD 3 prior COVID-19 infection in 4/2020, chronic cholecystitis s/p percutaneous cholecystostomy tube, recurrent COVID infection, Serratia bacteremia transferred from [facility] for sepsis.      ID is consulted for septic shock  Recently had Serratia bacteremia discharged on suppressive PO Levaquin  Returned with leukocytosis, fever, AMS, hypotension requiring pressors  ?purulent sputum coming out from previous trach site  CXR no PNA  CTH unrevealing  LVAD exit sites shows no signs of infection  Perc dawn tube site shows no signs of infection    Antibiotics:  Vancomycin 2/28 - 3/1  Zosyn 2/28  Cefepime 2/28 -     Currently unclear etiology of septic shock likely secondary to recurrent serratia bacteremia  Source of serratia is likely LVAD? or other intra-abdominal focus  Can D/C vancomycin and decrease cefepime to non-pseudomonas dosing  Will need CT chest, A/P to rule out any abscess or distant focus  Detailed discussion with CICU attending Dr. Jensen, patient likely would have worsening respiratory status but due to his multiple comorbidities and previous intubation, it is futile to escalate care to intubation as patient will likely unable to be weaned off ventilator.      IMPRESSION:  Septic shock 2/2 serratia bacteremia  Leukocytosis  Fever  LVAD      RECOMMENDATIONS:  - Continue IV cefepime 1gm q12hrs  - Follow up sensi for Serratia and repeat blood cultures  - Obtain blood culture record from [facility] as well  - CT chest and abdomen/pelvis with contrast  - Trend WBC, fever curve, transaminases, creatinine daily  - Will continue to follow  - GOC discussion  - Guarded prognosis      * THIS IS AN INCOMPLETE NOTE. FINAL RECOMMENDATION WILL BE UPDATED AFTER DISCUSSING WITH ATTENDING *   66 years old male with PMHx of Stage D HF 2/2 NICM s/p HM2 LVAD in 9/2017 at  (due to severe PAD) with TV ring, multiple GI bleeds, no AC, CKD 3 prior COVID-19 infection in 4/2020, chronic cholecystitis s/p percutaneous cholecystostomy tube, recurrent COVID infection, Serratia bacteremia transferred from [facility] for sepsis.    ID is consulted for septic shock  Recently had Serratia bacteremia discharged on suppressive PO Levaquin  Returned with leukocytosis, fever, AMS, hypotension requiring pressors  ?purulent sputum coming out from previous trach site  CXR no PNA  CTH unrevealing  LVAD exit sites shows no signs of infection  Perc dawn tube site shows no signs of infection    Antibiotics:  Vancomycin 2/28 - 3/1  Zosyn 2/28  Cefepime 2/28 -     Currently unclear etiology of septic shock likely secondary to recurrent serratia bacteremia  Source of serratia is likely LVAD? or other intra-abdominal focus  Can D/C vancomycin and decrease cefepime to non-pseudomonas dosing  Will need CT chest, A/P to rule out any abscess or distant focus  Detailed discussion with CICU attending Dr. Jensen, patient likely would have worsening respiratory status but due to his multiple comorbidities and previous intubation, it is futile to escalate care to intubation as patient will likely unable to be weaned off ventilator.      IMPRESSION:  Septic shock 2/2 serratia bacteremia  Leukocytosis  Fever  LVAD      RECOMMENDATIONS:  - Continue IV cefepime 1gm q12hrs  - Follow up sensi for Serratia and repeat blood cultures  - Obtain blood culture record from [facility] as well  - CT chest and abdomen/pelvis with contrast when patient is stable  - Trend WBC, fever curve, transaminases, creatinine daily  - Will continue to follow  - GOC discussion  - Guarded prognosis      * THIS IS AN INCOMPLETE NOTE. FINAL RECOMMENDATION WILL BE UPDATED AFTER DISCUSSING WITH ATTENDING *

## 2022-03-02 NOTE — PROGRESS NOTE ADULT - ATTENDING COMMENTS
episodes of NSVT. now on lidocaine and vaso. started on mitodrine.   MS has not returned to normal. Intermittently awake, but unresponsive.   Reveiving enteral nutrition.   seen by Dr jain: likely LVAD seeding. for CT when stable   has been seen by Palliative care who spoke to the family and has arranged a family meeting for tomorrow.   meds; lido 1mg/hr, vaso .04, mitodrine 10 tid, mexilitene 150 tid, mertazapine sertaline, cirilo. cefipime. 250albumin, 500 Nacl.   HR 70-80s, 71/-89/ 94%. 2l, tmax 101.1 CVP 8  LVAD 9200, 4.9, PI 5.7  I/O: +1.1  03-02    138  |  107  |  15  ----------------------------<  120<H>  3.8   |  20<L>  |  0.94    Ca    8.9      02 Mar 2022 03:59  Phos  2.8     03-02  Mg     2.1     03-02    TPro  7.1  /  Alb  2.7<L>  /  TBili  0.4  /  DBili  x   /  AST  21  /  ALT  22  /  AlkPhos  120  03-02                          8.9    22.54 )-----------( 85       ( 02 Mar 2022 04:00 )             28.2   .   INR 1.6  recurrent seratia sepsis. patient remains critically unwell.   goals of care discussion set for tomorrow.   continue supportive care.   would d/c mertazpine and cirilo if that may be influencing his MS  Imaging when stable  Zi Werner

## 2022-03-03 NOTE — PROGRESS NOTE ADULT - SUBJECTIVE AND OBJECTIVE BOX
SUBJECTIVE:   patient mental status improved from yesterday. Eyes open when we saw him, but still nonverbal.      Interval:   Patient improved from yesterday with eyes open and nodding in response to his name, still no verbal output no following of commands.    OBJECTIVE:    Vitals: T(F): 99.1  HR: 99  BP: --  RR: 30  SpO2: 99%    Exam: PHYSICAL EXAMINATION:  General: Thin, minimally responsive, but eyes open. EEG in place  Eyes: Conjunctiva and sclera clear.  Skin: no rash, no edema noted  Lung: no respiratory distress noted  Neurologic:  - Mental Status: Awake to lethargic. No verbal output. Eyes open, grimaces and withdraws to painful stimuli only in all all extremities. Does not look at examiner with eyes, but nods in response.  - Cranial Nerves II-XII: Limited by patient mental status. Pupillary reflexes intact b/l, corneal reflexes intact b/l. No notable facial asymmetry.  - Motor:  Strength is at least 2/5 b/l proximal UE, at least 3/5 distal b/l UE, 0/5 b/l lower extremities. Decrased muscle bulk and but normal tone throughout. Significantly less paratonic than yesterday.  - Reflexes:  2+ and symmetric at the biceps, triceps, brachioradialis. R patellar is 3+. L patellar 2+. Plantar responses neurtral b/l.  - Sensory:  Grimaces and slightly withdraws to painful stimuli in all 4 extremities.   - Coordination:  Unable to assess  - Gait:   Unable to assess.    Labs  03-03 Na 136  03-03 K 3.8  03-03 P 1.9<L>  03-03 Mg 1.9  03-03 Ca 8.8  03-03 Cr 0.99

## 2022-03-03 NOTE — PROGRESS NOTE ADULT - SUBJECTIVE AND OBJECTIVE BOX
Podiatry pager #: [phone]    Patient is a 66y old  Male who presents with a chief complaint of Septic shock (03 Mar 2022 19:49)        INTERVAL HPI/OVERNIGHT EVENTS:  Patient seen and evaluated at bedside.  Pt is resting comfortable in NAD. Denies N/V/F/C.  Pain rated at X/10    Allergies    Amiodarone Hydrochloride (Other (Severe))    Intolerances        Vital Signs Last 24 Hrs  T(C): 36.6 (04 Mar 2022 04:00), Max: 37.5 (03 Mar 2022 07:00)  T(F): 97.9 (04 Mar 2022 04:00), Max: 99.5 (03 Mar 2022 07:00)  HR: 91 (04 Mar 2022 06:15) (75 - 123)  BP: --  BP(mean): --  RR: 28 (04 Mar 2022 06:15) (20 - 35)  SpO2: 98% (04 Mar 2022 06:15) (83% - 100%)    cefepime   IVPB 1000 milliGRAM(s) IV Intermittent every 12 hours  chlorhexidine 2% Cloths 1 Application(s) Topical <User Schedule>  cholecalciferol 1000 Unit(s) Oral daily  insulin lispro (ADMELOG) corrective regimen sliding scale   SubCutaneous every 6 hours  lidocaine   Infusion 1 mG/Min IV Continuous <Continuous>  methimazole 10 milliGRAM(s) Oral daily  metoclopramide 10 milliGRAM(s) Oral four times a day  mexiletine 150 milliGRAM(s) Oral every 8 hours  multivitamin 1 Tablet(s) Oral daily  pantoprazole  Injectable 40 milliGRAM(s) IV Push every 12 hours  senna 2 Tablet(s) Oral at bedtime  sertraline 100 milliGRAM(s) Oral daily  vasopressin Infusion 0.04 Unit(s)/Min IV Continuous <Continuous>      LABS:                        7.7    13.47 )-----------( 101      ( 04 Mar 2022 00:50 )             24.2     03-04    133<L>  |  100  |  14  ----------------------------<  125<H>  3.7   |  22  |  0.92    Ca    8.6      04 Mar 2022 00:50  Phos  1.9     03-04  Mg     1.8     03-04    TPro  7.2  /  Alb  2.9<L>  /  TBili  0.3  /  DBili  x   /  AST  22  /  ALT  16  /  AlkPhos  144<H>  03-04    PT/INR - ( 04 Mar 2022 02:56 )   PT: 13.3 sec;   INR: 1.14 ratio         PTT - ( 04 Mar 2022 02:56 )  PTT:28.6 sec    CAPILLARY BLOOD GLUCOSE      POCT Blood Glucose.: 181 mg/dL (04 Mar 2022 05:21)  POCT Blood Glucose.: 109 mg/dL (03 Mar 2022 23:07)  POCT Blood Glucose.: 201 mg/dL (03 Mar 2022 17:59)  POCT Blood Glucose.: 195 mg/dL (03 Mar 2022 12:53)  POCT Blood Glucose.: 196 mg/dL (03 Mar 2022 06:30)      Lower Extremity Physical Exam:    Vasular: DP/PT 0/4, B/L, CFT <3 seconds B/L, Temperature gradient wnl, B/L.   Neuro: Epicritic sensation intact to the level of foot, B/L.  Musculoskeletal/Ortho: painful contracted hammertoes 2-5 bilat  Skin: right heel DTI present on admission   RADIOLOGY & ADDITIONAL TESTS:

## 2022-03-03 NOTE — PROGRESS NOTE ADULT - SUBJECTIVE AND OBJECTIVE BOX
RICKY JOINT  MRN#: [id number]  Subjective: pulmonary progress note  : acute hypercapnic respiratory failure . Septic shock , service rendered on 2002   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 s/p HM2 LVAD in 2017 at  (due to severe PAD) with TV ring, multiple GI bleeds, thus maintained off AC, CKD 3prior COVID-19 infection in 2020, recurrent syncope post LVAD s/p ILR, s/p prolonged complex hospitalization from -2021 initially admitted with abdominal pain complicated by GIB, respiratory failure s/p trach, multiple infections, s/p cholecystotomy tube and SMA stent 10/2021, ultimately discharged to [facility] and then returned to [facility] for trach decannulation , readmitted in 2022 with recurrent COVID-19 infection, initially in distributive shock. Blood cultures were also positive for serratia marcescens which he has had in the past. seen by heart failure team   continue on 3 liter nasal 02 and vasopressin, family meeting with Palliative care           (2022 22:20)    PAST MEDICAL & SURGICAL HISTORY:  CHF (congestive heart failure)    CAD (coronary artery disease)  Depression    Pleural effusion    History of 2019 novel coronavirus disease (COVID-19)  2020    Hemorrhoids    Bleeding hemorrhoids    Peripheral arterial disease    Claudication    BPH with urinary obstruction    ACC/AHA stage D systolic heart failure    Anticoagulation goal of INR 2.0 to 2.5    Falls    Clavicle fracture    CKD (chronic kidney disease), stage III    Iron deficiency anemia    H/O epistaxis    Vertigo    GI bleed    S/P TVR (tricuspid valve repair)    S/P ventricular assist device    S/P endoscopy    H/O tracheostomy        FAMILY HISTORY:    Social History:    Marital Status:  X    (   ) Single    (   )    (  )   Occupation: Retired   Lives with: (  ) alone  (  ) children  X spouse   (  ) parents  (  ) other    Substance Use (street drugs): X never used  (  ) other:  Tobacco Usage: X never smoked   (   ) former smoker   (   ) current smoker  (     ) pack year  (    ) last cigarette date  Alcohol Usage: Social         OBJECTIVE:  ICU Vital Signs Last 24 Hrs  T(C): 37.2 (01 Mar 2022 16:00), Max: 38.4 (01 Mar 2022 09:00)  T(F): 99 (01 Mar 2022 16:00), Max: 101.1 (01 Mar 2022 09:00)  HR: 109 (01 Mar 2022 18:00) (67 - 124)  BP: --  BP(mean): 86 (2022 20:00) (86 - 86)  ABP: 89/74 (01 Mar 2022 18:00) (75/63 - 160/85)  ABP(mean): 81 (01 Mar 2022 18:00) (68 - 139)  RR: 38 (01 Mar 2022 18:00) (5 - 40)  SpO2: 99% (01 Mar 2022 18:00) (94% - 100%)       @ 07: @ 07:00  --------------------------------------------------------  IN: 1060.7 mL / OUT: 1600 mL / NET: -539.3 mL     @ 07: @ 18:22  --------------------------------------------------------  IN: 677.8 mL / OUT: 455 mL / NET: 222.8 mL    PHYSICAL EXAM :Daily Height in cm: 177.8 (2022 21:20)  Elderly frail mail weaned off  positive pressure non invasive ventilator I/E ratio is14/5 on 3 liter nasal cannula on vasopressin    Daily Weight in k.7 (2022 21:20)  HEENT:     + NCAT  + EOMI  - Conjuctival edema   - Icterus   - Thrush   - ETT  - NGT/OGT  Neck:         + FROM RT IJ  JVD     - Nodes     - Masses    + Mid-line trachea   - Tracheostomy  Chest:           mild kyphosis   Lungs:          + CTA  + Rhonchi  + Rales    - Wheezing  + Decreased BS   - Dullness R L  Cardiac:       + S1 + S2    + RRR   - Irregular   - S3  - S4    - Murmurs   - Rub   - Hamman’s sign   Abdomen:    + BS     + Soft    + Non-tender  - Distended  - Organomegaly  - PEG Cholecystostomy tube in place   Extremities:   - Cyanosis U/L   - Clubbing  U/L  - LE/UE Edema   + Capillary refill    + Pulses   Neuro:        + Awake   +  Alert   - Confused   - Lethargic   - Sedated   - Generalized Weakness  Skin:        - Rashes    - Erythema   + Normal incisions   + IV sites intact  -      HOSPITAL MEDICATIONS: All mediciations reviewed and analyzed  MEDICATIONS  (STANDING):  albumin human  5% IVPB 250 milliLiter(s) IV Intermittent once  cefepime   IVPB 2000 milliGRAM(s) IV Intermittent every 12 hours  chlorhexidine 2% Cloths 1 Application(s) Topical <User Schedule>  cholecalciferol 1000 Unit(s) Oral daily  dextrose 40% Gel 15 Gram(s) Oral once  dextrose 50% Injectable 25 Gram(s) IV Push once  gabapentin Solution 100 milliGRAM(s) Oral three times a day  glucagon  Injectable 1 milliGRAM(s) IntraMuscular once  insulin lispro (ADMELOG) corrective regimen sliding scale   SubCutaneous every 6 hours  magnesium sulfate  IVPB 1 Gram(s) IV Intermittent once  methimazole 10 milliGRAM(s) Oral daily  metoclopramide 10 milliGRAM(s) Oral four times a day  mexiletine 150 milliGRAM(s) Oral every 8 hours  mirtazapine 7.5 milliGRAM(s) Oral at bedtime  multivitamin 1 Tablet(s) Oral daily  pantoprazole  Injectable 40 milliGRAM(s) IV Push daily  potassium phosphate IVPB 15 milliMole(s) IV Intermittent once  senna 2 Tablet(s) Oral at bedtime  sertraline 100 milliGRAM(s) Oral daily  vancomycin  IVPB 750 milliGRAM(s) IV Intermittent every 24 hours  vasopressin Infusion 0.02 Unit(s)/Min (1.2 mL/Hr) IV Continuous <Continuous>    MEDICATIONS  (PRN):    LABS: All Lab data reviewed and analyzed                         8.9    22.54 )-----------( 85       ( 02 Mar 2022 04:00 )             28.2    03-02    138  |  107  |  15  ----------------------------<  120<H>  3.8   |  20<L>  |  0.94    Ca    8.9      02 Mar 2022 03:59  Phos  2.8     03-02  Mg     2.1     03-    TPro  7.1  /  Alb  2.7<L>  /  TBili  0.4  /  DBili  x   /  AST  21  /  ALT  22  /  AlkPhos  120  -         PTT - ( 01 Mar 2022 00:08 )  PTT:25.0 sec LIVER FUNCTIONS - ( 01 Mar 2022 17:38 )  Alb: 2.9 g/dL / Pro: 7.3 g/dL / ALK PHOS: 116 U/L / ALT: 30 U/L / AST: 29 U/L / GGT: x           RADIOLOGY: - Reviewed and analyzed

## 2022-03-03 NOTE — PROGRESS NOTE ADULT - ATTENDING COMMENTS
Patient seen and examined    Case discussed with Dr Hall    I agree with his interval history exam and plans as noted above  A bit more awake and alert today  cultures again growing serratia  On cefepime 1 gram IV q 12hr  repeat blood cultures for clearing  GOC discussion decision made to maintain current level of care but not to pursue intubation should the question arise    Morris Prater MD  [phone]  After 5pm/weekends [phone]

## 2022-03-03 NOTE — GOALS OF CARE CONVERSATION - ADVANCED CARE PLANNING - CONVERSATION DETAILS
GAP consulted to assist in GOC discussion in the setting of patient with advanced illness. Patient known to GAP from prior admissions. ELIZABETHW, MEGAN Diaz, Dr. Burgos of [facility], Dr. Jensen of [facility], and Tamanna LVAD Coordinator met with patient's sister/HCP Alyssa, brother-in-law Miller, son Kang, and sister-in-law Alyssa for family meeting today. Cymro Creole  through [employer] on standby throughout meeting as well today to assist in translation, ID #[id number].     Team first introduced and reintroduced selves and roles in patient care. Family verbalized understanding and were receptive to meeting today. Team next inquired into family understanding of patient's current medical status, and family demonstrates some understanding of patient's condition. Family states that they are aware that patient is "very sick", and discussed patient's recent rehabilitation stay and progress made during that time. Family states that they are unclear as to why patient is so sick at this time, and team provided additional medical update today.    Team informed family that patient's mental status has been confused and that patient is not verbal at present. Team additionally informed family that patient remains on pressor support, and discussed patient's recurring infection, noting the source as patient's LVAD. Team informed family that this infection cannot be cured. Team discussed concerns with patient's overall clinical status, and discussed patient's recent hospitalizations and procedures endured including intubation/extubation, tracheotomy, poor nutritional status, bleed, and COVID hx. Team informed family that it is unclear if patient will have meaningful recovery at this time, and to what extent, if any. Team informed family that patient appears to be entering the dying process. Family verbalized understanding to aforementioned information, and all questions appropriately answered.    Family informed team that they are understanding of the severity of patient's medical status, but state that they are unwilling to deescalate care in this case. Family states that they continue to hope and pray for improvement, but feel that when it is patient's time to pass, it is his time and "up to God". Family notes much appreciation of care provided to patient throughout this process, and team validated family in their goals today.    Team inquired into advance care planning, and inquired into what family might find to be acceopta GAP consulted to assist in GOC discussion in the setting of patient with advanced illness. Patient known to GAP from prior admissions. ELIZABETHW, MEGAN Diaz, Dr. Burgos of [facility], Dr. Jensen of [facility], and Tamanna LVAD Coordinator met with patient's sister/HCP Alyssa, brother-in-law Miller, son Kang, and sister-in-law Alyssa for family meeting today. Australian Creole  through [employer] on standby throughout meeting as well today to assist in translation, ID #[id number].     Team first introduced and reintroduced selves and roles in patient care. Family verbalized understanding and were receptive to meeting today. Team next inquired into family understanding of patient's current medical status, and family demonstrates some understanding of patient's condition. Family states that they are aware that patient is "very sick", and discussed patient's recent rehabilitation stay and progress made during that time. Family states that they are unclear as to why patient is so sick at this time, and team provided additional medical update today.    Team informed family that patient's mental status has been confused and that patient is not verbal at present. Team additionally informed family that patient remains on pressor support, and discussed patient's recurring infection, noting the source as patient's LVAD. Team informed family that this infection cannot be cured. Team discussed concerns with patient's overall clinical status, and discussed patient's recent hospitalizations and procedures endured including intubation/extubation, tracheotomy, poor nutritional status, bleeding, and COVID hx. Team informed family that it is unclear if patient will have meaningful recovery at this time, and to what extent, if any. Team informed family that patient appears to be entering the dying process. Family verbalized understanding to aforementioned information, and all questions appropriately answered.    Family informed team that they are understanding of the severity of patient's medical status, but state that they are unwilling to deescalate care in this case. Family states that they continue to hope and pray for improvement, but feel that when it is patient's time to pass, it is his time and "up to God". Family notes much appreciation of care provided to patient throughout this process, and team validated family in their goals today.    Team inquired into advance care planning, and inquired into what family might find to be acceptable in the event patient's heart were to stop or should patient's breathing worsen. Team discussed patient's previous intubations and successful extubations, but note concern for patient's ability to be successfully weaned if intubated at this time. Team informed family that patient's infection will eventually likely stop responding to abx which will in turn likely worsen patient's BP, and discussed importance of identifying treatment goals prior to an event. Family verbalized understanding today, and verbalized agreement to DNR/I status. Family was appropriately tearful, and team validated family in the love, support, and advocacy shown to patient throughout this process. Much emotional support, active listening, and room for ventilation of feelings provided, and family verbalized appreciation. MOLST completed today to reflect DNR/I status, but all other medical management to continue at this time- no deescalation of care at this time.    GAP will continue to follow this case.

## 2022-03-03 NOTE — PROGRESS NOTE ADULT - ATTENDING COMMENTS
Extensive medical history including HFrEF s/p LVAD transferred to CICU with acute respiratory failure and altered mental status  A+Ox0, non-verbal and follows only minimal commands, CT head benign  LVAD in place, approximately 5 LPM of flow  Septic shock requiring Vasopressin infusion, s/p multiple liters of crystalloid fluid - add Midodrine as needed  Minimal ectopy on Lidocaine infusion - maintain and restart Mexiletine  SpO2 mid 90s on nasal cannula  Tolerating tube feeds  Normal renal function, low filling pressures - target even to 500 cc positive  H/H low but acceptable, no lab evidence of hemolysis   Sepsis, blood cultures +Serratia - continue Cefepime  Unclear source of infection, concern for LVAD seeding vs. prior splenic abscess - obtain CT abd/pelvis  Sugars controlled; continue Methimazole   TLC and nabil 2/28  DNR/DNI per family meeting today  Poor prognosis

## 2022-03-03 NOTE — PROGRESS NOTE ADULT - ASSESSMENT
66 years old male with PMHx of Stage D HF 2/2 NICM s/p HM2 LVAD in 9/2017 at  (due to severe PAD) with TV ring, multiple GI bleeds, no AC, CKD 3 prior COVID-19 infection in 4/2020, chronic cholecystitis s/p percutaneous cholecystostomy tube, recurrent COVID infection, Serratia bacteremia transferred from [facility] for sepsis.    ID is consulted for septic shock  Recently had Serratia bacteremia discharged on suppressive PO Levaquin  Returned with leukocytosis, fever, AMS, hypotension requiring pressors  ?purulent sputum coming out from previous trach site  CXR no PNA  CTH unrevealing  LVAD exit sites shows no signs of infection  Perc dawn tube site shows no signs of infection  BCx Serratia, S cefepime and ertapenem, R quinolones    Antibiotics:  Vancomycin 2/28 - 3/1  Zosyn 2/28  Cefepime 2/28 -     Currently unclear etiology of septic shock likely secondary to recurrent serratia bacteremia  Source of serratia is likely LVAD? or other intra-abdominal focus  Can D/C vancomycin and decrease cefepime to non-pseudomonas dosing  Will need CT chest, A/P to rule out any abscess or distant focus  Detailed discussion with CICU attending Dr. Jensen, patient likely would have worsening respiratory status but due to his multiple comorbidities and previous intubation, it is futile to escalate care to intubation as patient will likely unable to be weaned off ventilator.      IMPRESSION:  Septic shock 2/2 serratia bacteremia  Leukocytosis  Fever  LVAD      RECOMMENDATIONS:  - Continue IV cefepime 1gm q12hrs; can change to IV ertapenem 1gm q24hrs if patient continues to have poor mental status  - Follow up blood cultures  - Obtain blood culture record from [facility] as well  - CT chest and abdomen/pelvis with contrast when patient is stable  - Aspiration precaution  - Trend WBC, fever curve, transaminases, creatinine daily  - Will continue to follow  - GOC discussion  - Guarded prognosis      * THIS IS AN INCOMPLETE NOTE. FINAL RECOMMENDATION WILL BE UPDATED AFTER DISCUSSING WITH ATTENDING *   66 years old male with PMHx of Stage D HF 2/2 NICM s/p HM2 LVAD in 9/2017 at  (due to severe PAD) with TV ring, multiple GI bleeds, no AC, CKD 3 prior COVID-19 infection in 4/2020, chronic cholecystitis s/p percutaneous cholecystostomy tube, recurrent COVID infection, Serratia bacteremia transferred from [facility] for sepsis.    ID is consulted for septic shock  Recently had Serratia bacteremia discharged on suppressive PO Levaquin  Returned with leukocytosis, fever, AMS, hypotension requiring pressors  ?purulent sputum coming out from previous trach site  CXR no PNA  CTH unrevealing  LVAD exit sites shows no signs of infection  Perc dawn tube site shows no signs of infection  BCx Serratia, S cefepime and ertapenem, R quinolones    Antibiotics:  Vancomycin 2/28 - 3/1  Zosyn 2/28  Cefepime 2/28 -     Currently unclear etiology of septic shock likely secondary to recurrent serratia bacteremia  Source of serratia is likely LVAD? or other intra-abdominal focus  Can D/C vancomycin and decrease cefepime to non-pseudomonas dosing  Will need CT chest, A/P to rule out any abscess or distant focus  Detailed discussion with CICU attending Dr. Jensen, patient likely would have worsening respiratory status but due to his multiple comorbidities and previous intubation, it is futile to escalate care to intubation as patient will likely unable to be weaned off ventilator.      IMPRESSION:  Septic shock 2/2 serratia bacteremia  Leukocytosis  Fever  LVAD      RECOMMENDATIONS:  - Continue IV cefepime 1gm q12hrs; can change to IV ertapenem 1gm q24hrs if patient continues to have poor mental status  - Follow up blood cultures  - Obtain blood culture record from [facility] as well  - CT chest and abdomen/pelvis with contrast when patient is stable  - Aspiration precaution  - Trend WBC, fever curve, transaminases, creatinine daily  - Will continue to follow  - GOC discussion  - Guarded prognosis      Patient is seen and examined with attending and case is discussed with primary team      Marisa Hall DO  PGY4 ID fellow  Please contact me via page or text through Microsoft Teams  If after 5PM or on weekends, please call [phone]

## 2022-03-03 NOTE — PROGRESS NOTE ADULT - SUBJECTIVE AND OBJECTIVE BOX
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 s/p HM2 LVAD in 9/2017 at  (due to severe PAD) with TV ring, multiple GI bleeds, thus maintained off AC, CKD 3prior COVID-19 infection in 4/2020, recurrent syncope post LVAD s/p ILR, s/p prolonged complex hospitalization from 6/14-11/16/2021 initially admitted with abdominal pain complicated by GIB, respiratory failure s/p trach, multiple infections, s/p cholecystotomy tube and SMA stent 10/2021, ultimately discharged to [facility] rehab and then returned to [facility] for trach decannulation 12/2, readmitted in 2/2022 with recurrent COVID-19 infection, initially in distributive shock. Blood cultures were also positive for serratia marcescens which he has had in the past.     Pt was transferred to [facility] CICU from [facility]. He was sent to their ED from a rehab center due to AMS and tachycardia. At [facility], they noted a WBC greater than 20, tachycardia and hypercarbia on ABG. Pt was placed on BiPAP and transferred to [facility] CICU for concern for sepsis vs septic shock.      (27 Feb 2022 22:20)    Collateral info was gathered from the patient's sister (HCP) that indicated, in the [place], the patient also had a fall. However, CT Head dated 2/28 is negative for acute events.     3/1 The patient was lethargic, non verbal, and not able to f/u commands. He was having mild respiratory distress.   3/3 Though more alert, he is still lethargic, not able to f/u commands, and non verbal. He is still on pressors and with a mild degree of respiratory distress.     PERTINENT PM/SXH:   No pertinent past medical history    CHF (congestive heart failure)    CAD (coronary artery disease)    Depression    Pleural effusion    History of 2019 novel coronavirus disease (COVID-19)    Hemorrhoids    Bleeding hemorrhoids    Peripheral arterial disease    Claudication    BPH with urinary obstruction    ACC/AHA stage D systolic heart failure    Anticoagulation goal of INR 2.0 to 2.5    Falls    Clavicle fracture    CKD (chronic kidney disease), stage III    Iron deficiency anemia    H/O epistaxis    Vertigo    GI bleed      No significant past surgical history    S/P TVR (tricuspid valve repair)    S/P ventricular assist device    S/P endoscopy    H/O tracheostomy      FAMILY HISTORY:    ITEMS NOT CHECKED ARE NOT PRESENT    SOCIAL HISTORY:   Significant other/partner[ ]  Children[x ]  Synagogue/Spirituality:  Substance hx:  [ ]   Tobacco hx:  [ ]   Alcohol hx: [ ]   Home Opioid hx:  [ ] I-Stop Reference No:  Living Situation: [ ]Home  [ ]Long term care  [ ]Rehab [ ]Other    ADVANCE DIRECTIVES:    DNR  MOLST  [ ]  Living Will  [ ]   DECISION MAKER(s):  [x ] Health Care Proxy(s)  [ ] Surrogate(s)  [ ] Guardian           Name(s): Phone Number(s): Cristina Rudolph [phone]    BASELINE (I)ADL(s) (prior to admission):  [place]: [ ]Total  [x ] Moderate [ ]Dependent    Allergies    Amiodarone Hydrochloride (Other (Severe))    Intolerances    MEDICATIONS  (STANDING):  cefepime   IVPB 1000 milliGRAM(s) IV Intermittent every 12 hours  chlorhexidine 2% Cloths 1 Application(s) Topical <User Schedule>  cholecalciferol 1000 Unit(s) Oral daily  insulin lispro (ADMELOG) corrective regimen sliding scale   SubCutaneous every 6 hours  lidocaine   Infusion 1 mG/Min (7.5 mL/Hr) IV Continuous <Continuous>  methimazole 10 milliGRAM(s) Oral daily  metoclopramide 10 milliGRAM(s) Oral four times a day  mexiletine 150 milliGRAM(s) Oral every 8 hours  multivitamin 1 Tablet(s) Oral daily  pantoprazole  Injectable 40 milliGRAM(s) IV Push every 12 hours  senna 2 Tablet(s) Oral at bedtime  sertraline 100 milliGRAM(s) Oral daily  vasopressin Infusion 0.02 Unit(s)/Min (1.2 mL/Hr) IV Continuous <Continuous>    MEDICATIONS  (PRN):      PRESENT SYMPTOMS: [x ]Unable to obtain due to poor mentation   Source if other than patient:  [ ]Family   [ ]Team     Pain: [ ]yes [x ]no  QOL impact -   Location -                    Aggravating factors -  Quality -  Radiation -  Timing-  Severity (0-10 scale):  Minimal acceptable level (0-10 scale):     CPOT:    https://www.[facility].org/getattachment/[email or web address]/Critical-Care-Pain-Observation-Tool-(CPOT)      PAIN AD Score:     http://geriatrictoolkit.[facility]/cog/painad.pdf (press ctrl +  left click to view)  RDOS scale (https://pubmed.ncbi.nlm.nih.gov/02226221/, https://www."[employer]"/doi/10.1089/jpm.2009.0229?url_ver=Z39.&rfr_id=mala:rid:crossref.org&rfr_dat=cr_pub%20%200pubmed)    A score of 0 to 2 signifies little or no respiratory distress, 3 signifies mild distress, scores 4 to 6 indicate moderate distress, and scores greater than 7 signify severe distress  RDOS score: 2  Dyspnea:                           [ ]Mild [ ]Moderate [ ]Severe  Anxiety:                             [ ]Mild [ ]Moderate [ ]Severe  Fatigue:                             [ ]Mild [ ]Moderate [ ]Severe  Nausea:                             [ ]Mild [ ]Moderate [ ]Severe  Loss of appetite:              [ ]Mild [ ]Moderate [ ]Severe  Constipation:                    [ ]Mild [ ]Moderate [ ]Severe    Other Symptoms:  [ ]All other review of systems negative     Palliative Performance Status Version 2: 20        %    http://npcrc.org/files/news/palliative_performance_scale_ppsv2.pdf  PHYSICAL EXAM:  Vital Signs Last 24 Hrs  T(C): 36.9 (03 Mar 2022 15:00), Max: 37.5 (02 Mar 2022 19:00)  T(F): 98.4 (03 Mar 2022 15:00), Max: 99.5 (02 Mar 2022 19:00)  HR: 97 (03 Mar 2022 16:00) (82 - 121)  BP: --  BP(mean): 83 (02 Mar 2022 20:00) (83 - 83)  RR: 28 (03 Mar 2022 16:00) (20 - 35)  SpO2: 98% (03 Mar 2022 16:00) (96% - 100%)    GENERAL:  [ ]Alert  [ ]Oriented x   [x ]Lethargic  [ ]Cachexia  [ ]Unarousable  [ ]Verbal  [x ]Non-Verbal  Behavioral:   [ ] Anxiety  [ ] Delirium [ ] Agitation [x ] Other: Encephalopathic   HEENT:  [ ]Normal   [ x]Dry mouth   [ ]ET Tube/Trach  [ ]Oral lesions  PULMONARY:   [ ]Clear [x ]Tachypnea  [ ]Audible excessive secretions   [ ]Rhonchi        [ ]Right [ ]Left [ ]Bilateral  [ ]Crackles        [ ]Right [ ]Left [ ]Bilateral  [ ]Wheezing     [ ]Right [ ]Left [ ]Bilateral  [x ]Diminished breath sounds [ ]right [ ]left [x ]bilateral  CARDIOVASCULAR:    [x ]Regular [ ]Irregular [ ]Tachy  [ ]Jose [ ]Murmur [ ]Other  [x] LVAD hum  GASTROINTESTINAL:  [ ]Soft  [ ]Distended   [ ]+BS  [ ]Non tender [ ]Tender  [ ]PEG [ ]OGT/ NGT  Last BM: 3/3  GENITOURINARY:  [ ]Normal [x ] Incontinent   [ ]Oliguria/Anuria   [ ]Landrum  MUSCULOSKELETAL:   [ ]Normal   [x ]Weakness  [ ]Bed/Wheelchair bound [ ]Edema  NEUROLOGIC:   [ ]No focal deficits  [ x]Cognitive impairment  [ ]Dysphagia [ ]Dysarthria [ ]Paresis [ ]Other   SKIN:   [ ]Normal    [ ]Rash  [ ]Pressure ulcer(s)       Present on admission [ ]y [ ]n  [x] BL Heel and R Buttock DTI   CRITICAL CARE:  [ ] Shock Present  [ ]Septic [ ]Cardiogenic [ ]Neurologic [ ]Hypovolemic  [ ]  Vasopressors [ ]  Inotropes   [ ]Respiratory failure present [ ]Mechanical ventilation [ ]Non-invasive ventilatory support [ ]High flow    [ ]Acute  [ ]Chronic [ ]Hypoxic  [ ]Hypercarbic [ ]Other  [ ]Other organ failure     LABS:                                           7.8    16.60 )-----------( 86       ( 03 Mar 2022 02:53 )             24.8   03-03    136  |  105  |  15  ----------------------------<  198<H>  3.8   |  21<L>  |  0.99    Ca    8.8      03 Mar 2022 02:53  Phos  1.9     03-03  Mg     1.9     03-03    TPro  7.2  /  Alb  2.8<L>  /  TBili  0.3  /  DBili  x   /  AST  17  /  ALT  17  /  AlkPhos  128<H>  03-03        RADIOLOGY & ADDITIONAL STUDIES:  Reviewed   PROTEIN CALORIE MALNUTRITION PRESENT: [ ]mild [ ]moderate [ ]severe [ ]underweight [ ]morbid obesity  https://www.andeal.org/vault/2440/web/files/ONC/Table_Clinical%20Characteristics%20to%20Document%20Malnutrition-White%20JV%20et%20al%202012.pdf    Height (cm): 177.8 (02-27-22 @ 21:20), 182.9 (12-13-21 @ 04:30), 165.1 (12-02-21 @ 13:55)  Weight (kg): 48.7 (02-27-22 @ 21:20), 52.6 (01-12-22 @ 12:50), 53.6 (12-02-21 @ 13:55)  BMI (kg/m2): 15.4 (02-27-22 @ 21:20), 15.7 (01-12-22 @ 12:50), 16 (12-13-21 @ 04:30)    [ ]PPSV2 < or = to 30% [ ]significant weight loss  [ ]poor nutritional intake  [ ]anasarca      [ ]Artificial Nutrition      REFERRALS:   [ ]Chaplaincy  [ ]Hospice  [ ]Child Life  [ ]Social Work  [ ]Case management [ ]Holistic Therapy     Goals of Care Document:

## 2022-03-03 NOTE — PROGRESS NOTE ADULT - PROBLEM SELECTOR PLAN 4
Will continue to f/u for GOC and support.        Francisco Burgos MD   Geriatrics and Palliative Care (GAP) Consult Service    of Geriatric and Palliative Medicine  [facility]      Please page the following number for clinical matters between the hours of 9 am and 5 pm from Monday through Friday : [phone]    After 5pm and on weekends, please see the contact information below:    In the event of newly developing, evolving, or worsening symptoms, please contact the Palliative Medicine team via pager (if the patient is at [facility] #[phone] or if the patient is at [facility] #[phone]) The Geriatric and Palliative Medicine service has coverage 24 hours a day/ 7 days a week to provide medical recommendations regarding symptom management needs via telephone

## 2022-03-03 NOTE — PROGRESS NOTE ADULT - SUBJECTIVE AND OBJECTIVE BOX
INFECTIOUS DISEASE FOLLOW UP NOTE:    Interval History/ROS: Patient is a 66y old  Male who presents with a chief complaint of Septic shock (03 Mar 2022 06:46)      Overnight events:    REVIEW OF SYSTEMS:  CONSTITUTIONAL: No unintentional weight loss; no weakness; no fevers or chills  HEENT: No decrease hearing, tinnitus, ear pain, rhinorrhea, congestion, or sore throat  RESPIRATORY: No cough, wheezing, hemoptysis; no shortness of breath/shortness of breath on exertion; no orthopnea  CARDIOVASCULAR: No chest pain or palpitations  GASTROINTESTINAL: No abdominal or epigastric pain; no change in appetite; no early satiety; no nausea, vomiting, or hematemesis; no diarrhea or constipation; no melena or hematochezia; no change of stool caliber  GENITOURINARY: No dysuria, increased in urinary frequency or hematuria; no urethral discharge  NEUROLOGICAL: No headache/dizziness; no focal numbness or motor weakness  MSK: No joint pain, erythema, or swelling; no back pain  SKIN: No itching, rashes; no recent insect bites  All other ROS negative except noted above    Prior hospital charts reviewed [Yes]  Primary team notes reviewed [Yes]  Other consultant notes reviewed [Yes]    Allergies:  Amiodarone Hydrochloride (Other (Severe))      ANTIMICROBIALS:   cefepime   IVPB 1000 every 12 hours      OTHER MEDS: MEDICATIONS  (STANDING):  insulin lispro (ADMELOG) corrective regimen sliding scale  every 6 hours  lidocaine   Infusion 1 <Continuous>  methimazole 10 daily  metoclopramide 10 four times a day  pantoprazole  Injectable 40 every 12 hours  senna 2 at bedtime  sertraline 100 daily  vasopressin Infusion 0.02 <Continuous>      Vital Signs Last 24 Hrs  T(F): 99.5 (03-03-22 @ 07:00), Max: 101.1 (03-01-22 @ 09:00)    Vital Signs Last 24 Hrs  HR: 92 (03-03-22 @ 10:00) (65 - 121)  BP: --  RR: 27 (03-03-22 @ 10:00)  SpO2: 100% (03-03-22 @ 10:00) (96% - 100%)  Wt(kg): --    EXAM:  GENERAL: NAD, lying in bed comfortably  HEAD: Atraumatic, Normocephalic  EYES: EOMI, PERRLA, conjunctiva pink and cornea white  ENT: Normal external ears and nose, no discharges; moist mucous membranes, no erythema on posterior oropharynx  NECK: Supple, nontender to palpation; no JVD  CHEST/LUNG: Clear to auscultation bilaterally; No rales, rhonchi, wheezing, or rubs. Unlabored respirations  HEART: Regular rate and rhythm; No murmurs, rubs, or gallops  ABDOMEN: Soft, nontender, nondistended; no rebound tenderness, no guarding; no hepatomegaly; normoactive/hyperactive/hypoactive bowel sounds  EXTREMITIES:  2+ Peripheral Pulses, brisk capillary refill. No clubbing, cyanosis, or petal edema  NERVOUS SYSTEM: Alert and oriented to person, time, place and situation, speech clear. No focal deficits   MSK: FROM all 4 extremities, full and equal strength; no joint erythema, swelling or pain  SKIN: No rashes or lesions, no superficial thrombophlebitis    Labs:                        7.8    16.60 )-----------( 86       ( 03 Mar 2022 02:53 )             24.8     03-03    136  |  105  |  15  ----------------------------<  198<H>  3.8   |  21<L>  |  0.99    Ca    8.8      03 Mar 2022 02:53  Phos  1.9     03-03  Mg     1.9     03-03    TPro  7.2  /  Alb  2.8<L>  /  TBili  0.3  /  DBili  x   /  AST  17  /  ALT  17  /  AlkPhos  128<H>  03-03      WBC Trend:  WBC Count: 16.60 (03-03-22 @ 02:53)  WBC Count: 22.54 (03-02-22 @ 04:00)  WBC Count: 21.24 (03-01-22 @ 17:38)  WBC Count: 24.36 (03-01-22 @ 10:49)      Creatine Trend:  Creatinine, Serum: 0.99 (03-03)  Creatinine, Serum: 0.94 (03-02)  Creatinine, Serum: 0.97 (03-01)  Creatinine, Serum: 1.01 (03-01)      Liver Biochemical Testing Trend:  Alanine Aminotransferase (ALT/SGPT): 17 (03-03)  Alanine Aminotransferase (ALT/SGPT): 22 (03-02)  Alanine Aminotransferase (ALT/SGPT): 30 (03-01)  Alanine Aminotransferase (ALT/SGPT): 40 (03-01)  Alanine Aminotransferase (ALT/SGPT): 47 *H* (02-28)  Aspartate Aminotransferase (AST/SGOT): 17 (03-03-22 @ 02:53)  Aspartate Aminotransferase (AST/SGOT): 21 (03-02-22 @ 03:59)  Aspartate Aminotransferase (AST/SGOT): 29 (03-01-22 @ 17:38)  Aspartate Aminotransferase (AST/SGOT): 58 (03-01-22 @ 00:08)  Aspartate Aminotransferase (AST/SGOT): 82 (02-28-22 @ 15:47)  Bilirubin Total, Serum: 0.3 (03-03)  Bilirubin Total, Serum: 0.4 (03-02)  Bilirubin Total, Serum: 0.4 (03-01)  Bilirubin Total, Serum: 0.5 (03-01)  Bilirubin Total, Serum: 0.5 (02-28)      Trend LDH  03-03-22 @ 02:53  179  03-02-22 @ 03:59  211  03-01-22 @ 00:08  199  02-27-22 @ 23:13  381<H>  01-30-22 @ 06:16  227          MICROBIOLOGY:  Vancomycin Level, Random: 8.5 (02-28 @ 04:53)    MRSA PCR Result.: NotDetec (02-28-22 @ 04:25)  MRSA PCR Result.: NotDetec (12-13-21 @ 07:59)  MRSA PCR Result.: NotDetec (10-17-21 @ 21:55)  MRSA PCR Result.: NotDetec (06-14-21 @ 23:02)      Culture - Blood (collected 28 Feb 2022 04:26)  Source: .Blood Blood-Peripheral  Preliminary Report:    Growth in aerobic bottle: Serratia marcescens    See previous culture [id number]    Culture - Blood (collected 28 Feb 2022 01:33)  Source: .Blood Blood-Peripheral  Preliminary Report:    Growth in aerobic bottle: Serratia marcescens    Growth in anaerobic bottle: Gram Negative Rods    ***Blood Panel PCR results on this specimen are available    approximately 3 hours after the Gram stain result.***    Gram stain, PCR, and/or culture resultsmay not always    correspond due to difference in methodologies.    ************************************************************    This PCR assay was performed by multiplex PCR. This    Assay tests for 66 bacterial and resistance gene targets.    Please referto the [facility] test directory    at [email or web address] for details.  Organism: Blood Culture PCR  Serratia marcescens  Organism: Serratia marcescens    Sensitivities:      -  Amikacin: S <=16      -  Ampicillin: R >16 These ampicillin results predict results for amoxicillin      -  Ampicillin/Sulbactam: R >16/8 Enterobacter, Klebsiella aerogenes, Citrobacter, and Serratia may develop resistance during prolonged therapy (3-4 days)      -  Aztreonam: S <=4      -  Cefazolin: R >16 Enterobacter, Klebsiella aerogenes, Citrobacter, and Serratia may develop resistance during prolonged therapy (3-4 days)      -  Cefepime: S <=2      -  Cefoxitin: R >16      -  Ceftriaxone: R 8 Enterobacter, Klebsiella aerogenes, Citrobacter, and Serratia may develop resistance during prolonged therapy      -  Ciprofloxacin: R >2      -  Ertapenem: S <=0.5      -  Gentamicin: S <=2      -  Levofloxacin: R 4      -  Meropenem: S <=1      -  Piperacillin/Tazobactam: S <=8      -  Tobramycin: S <=2      -  Trimethoprim/Sulfamethoxazole: S 2/38      Method Type: CLAU  Organism: Blood Culture PCR    Sensitivities:      -  Serratia marcescens: Detec      Method Type: PCR    Culture - Blood (collected 14 Jan 2022 10:22)  Source: .Blood Blood  Final Report:    No Growth Final    Culture - Blood (collected 14 Jan 2022 10:22)  Source: .Blood Blood  Final Report:    No Growth Final    Culture - Blood (collected 13 Jan 2022 22:53)  Source: .Blood Blood  Final Report:    No Growth Final    Culture - Blood (collected 11 Jan 2022 23:02)  Source: .Blood Blood-Venous  Final Report:    Growth in aerobic and anaerobic bottles: Serratia marcescens    ***Blood Panel PCR results on this specimen are available    approximately 3 hours after the Gram stain result.***    Gram stain, PCR, and/or culture results may not always    correspond due to difference in methodologies.    ************************************************************    This PCR assay was performed by multiplex PCR. This    Assay tests for 66 bacterial and resistance gene targets.    Please refer to the [facility] test directory    at [email or web address] for details.  Organism: Blood Culture PCR  Serratia marcescens  Organism: Serratia marcescens    Sensitivities:      -  Amikacin: S <=16      -  Ampicillin: R >16 These ampicillin results predict results for amoxicillin      -  Ampicillin/Sulbactam: R >16/8 Enterobacter, Klebsiella aerogenes, Citrobacter, and Serratia may develop resistance during prolonged therapy (3-4 days)      -  Aztreonam: S <=4      -  Cefazolin: R >16 Enterobacter, Klebsiella aerogenes, Citrobacter, and Serratia may develop resistance during prolonged therapy (3-4 days)      -  Cefepime: S <=2      -  Cefoxitin: R >16      -  Ceftriaxone: S <=1 Enterobacter, Klebsiella aerogenes, Citrobacter, and Serratia may develop resistance during prolonged therapy      -  Ciprofloxacin: R >2      -  Ertapenem: S <=0.5      -  Gentamicin: S <=2      -  Levofloxacin: R 2      -  Meropenem: S <=1      -  Piperacillin/Tazobactam: S <=8      -  Tobramycin: S <=2      -  Trimethoprim/Sulfamethoxazole: S 2/38      Method Type: CLAU  Organism: Blood Culture PCR    Sensitivities:      -  Serratia marcescens: Detec      Method Type: PCR    Culture - Blood (collected 30 Dec 2021 22:07)  Source: .Blood Blood  Final Report:    No Growth Final    Culture - Blood (collected 18 Dec 2021 08:15)  Source: .Blood Blood  Final Report:    No Growth Final    Culture - Blood (collected 13 Dec 2021 05:50)  Source: .Blood Arterial Catheter  Final Report:    Growth in aerobic and anaerobic bottles: Serratia marcescens  Organism: Serratia marcescens  Organism: Serratia marcescens    Sensitivities:      -  Amikacin: S <=16      -  Ampicillin: R >16 These ampicillin results predict results for amoxicillin      -  Ampicillin/Sulbactam: R >16/8 Enterobacter, Klebsiella aerogenes, Citrobacter, and Serratia may develop resistance during prolonged therapy (3-4 days)      -  Aztreonam: S <=4      -  Cefazolin: R >16 Enterobacter, Klebsiella aerogenes, Citrobacter, and Serratia may develop resistance during prolonged therapy (3-4 days)      -  Cefepime: S <=2      -  Cefoxitin: R >16      -  Ceftriaxone: S <=1 Enterobacter, Klebsiella aerogenes, Citrobacter, and Serratia may develop resistance during prolonged therapy      -  Ciprofloxacin: R >2      -  Ertapenem: S <=0.5      -  Gentamicin: S <=2      -  Levofloxacin: R 2      -  Meropenem: S <=1      -  Piperacillin/Tazobactam: S <=8      -  Tobramycin: S 4      -  Trimethoprim/Sulfamethoxazole: S 2/38      Method Type: CLAU    Culture - Blood (collected 13 Dec 2021 05:50)  Source: .Blood Blood-Peripheral  Final Report:    Growth in aerobic bottle: Serratia marcescens See previous culture    [id number]    ***Blood Panel PCR results on this specimen are available    approximately 3 hours after the Gram stain result.***    Gram stain, PCR, and/or culture results may not always    correspond due to difference in methodologies.    ************************************************************    This PCR assay was performed by multiplex PCR. This    Assay tests for 66 bacterial and resistance gene targets.    Please refer to the [facility] test directory    at [email or web address] for details.  Organism: Blood Culture PCR  Organism: Blood Culture PCR    Sensitivities:      -  Serratia marcescens: Detec      Method Type: PCR      HIV-1/2 Combo Result: Nonreact (01-14-21 @ 08:18)    ABS CD4: 129 /uL (04-07-20 @ 08:38)    Legionella Antigen, Urine: Negative (03-01 @ 04:13)    COVID-19 PCR: NotDetec (02-01-22 @ 13:13)  COVID-19 PCR: NotDetec (01-30-22 @ 06:10)  COVID-19 PCR: NotDetec (01-03-22 @ 05:55)    COVID-19 [place] Domain AB Interp: Positive (12-13-21 @ 04:49)  COVID-19 Nucleocapsid PAT AB Interp: Positive (12-13-21 @ 04:49)  COVID-19 [place] Domain AB Interp: Positive (06-15-21 @ 10:15)    Procalcitonin, Serum: 6.34 (03-01)  Procalcitonin, Serum: 10.93 (02-27)      Ferritin, Serum: 1828 (02-28)      Lactate Dehydrogenase, Serum: 179 (03-03)  Lactate Dehydrogenase, Serum: 211 (03-02)  Lactate Dehydrogenase, Serum: 199 (03-01)  Lactate Dehydrogenase, Serum: 381 (02-27)    Serum Pro-Brain Natriuretic Peptide: 22923 (03-03)  Serum Pro-Brain Natriuretic Peptide: 85388 (03-02)  Serum Pro-Brain Natriuretic Peptide: 5486 (02-27)    Troponin T, High Sensitivity Result: 24 (02-27)    Blood Gas Arterial, Lactate: 1.1 (03-03 @ 02:50)  Blood Gas Arterial, Lactate: 0.6 (03-02 @ 03:55)  Blood Gas Arterial, Lactate: 1.2 (03-01 @ 23:11)  Lactate, Blood: 1.0 (03-01 @ 17:38)      RADIOLOGY:  imaging below personally reviewed    < from: CT Head No Cont (02.28.22 @ 13:05) >  IMPRESSION:    No hydrocephalus, acute intracranial hemorrhage, mass effect, or brain   edema.    --- End of Report ---    < end of copied text >   INFECTIOUS DISEASE FOLLOW UP NOTE:    Interval History/ROS: Patient is a 66y old  Male who presents with a chief complaint of Septic shock (03 Mar 2022 06:46)    Overnight events: Patient remains afebrile and hemodynamically stable overnight. Off pressor. He is more awake today and respond to noise but unable to follow commands or answer questions.      REVIEW OF SYSTEMS:  Unable to collect due to cognitive impairement      Prior hospital charts reviewed [Yes]  Primary team notes reviewed [Yes]  Other consultant notes reviewed [Yes]    Allergies:  Amiodarone Hydrochloride (Other (Severe))      ANTIMICROBIALS:   cefepime   IVPB 1000 every 12 hours      OTHER MEDS: MEDICATIONS  (STANDING):  insulin lispro (ADMELOG) corrective regimen sliding scale  every 6 hours  lidocaine   Infusion 1 <Continuous>  methimazole 10 daily  metoclopramide 10 four times a day  pantoprazole  Injectable 40 every 12 hours  senna 2 at bedtime  sertraline 100 daily  vasopressin Infusion 0.02 <Continuous>      Vital Signs Last 24 Hrs  T(F): 99.5 (03-03-22 @ 07:00), Max: 101.1 (03-01-22 @ 09:00)    Vital Signs Last 24 Hrs  HR: 92 (03-03-22 @ 10:00) (65 - 121)  BP: --  RR: 27 (03-03-22 @ 10:00)  SpO2: 100% (03-03-22 @ 10:00) (96% - 100%)      EXAM:  GENERAL: lying in bed, a little tachypneic   HEAD: Atraumatic  EYES: Conjunctiva pink and cornea white  ENT: Normal external ears and nose, no discharges; dry mucous membranes  NECK: Supple, nontender to palpation; stoma at previous trach site; dry secretion; RIJ central line  CHEST/LUNG: + bilateral air entry, no rhonchi  HEART: S1 S2  ABDOMEN: Soft, nontender, nondistended; normoactive bowel sounds; Perc dawn site no erythema or drainage, Drive line site is without erythema  EXTREMITIES: No clubbing, cyanosis, or petal edema  NERVOUS SYSTEM: awake but alert, open eyes when called but not oriented. unable to follow commands. nonverbal  MSK: No joint erythema, swelling  SKIN: No rashes or lesions, no superficial thrombophlebitis  LINES: RIJ central line; Right axillary A line    Labs:                        7.8    16.60 )-----------( 86       ( 03 Mar 2022 02:53 )             24.8     03-03    136  |  105  |  15  ----------------------------<  198<H>  3.8   |  21<L>  |  0.99    Ca    8.8      03 Mar 2022 02:53  Phos  1.9     03-03  Mg     1.9     03-03    TPro  7.2  /  Alb  2.8<L>  /  TBili  0.3  /  DBili  x   /  AST  17  /  ALT  17  /  AlkPhos  128<H>  03-03      WBC Trend:  WBC Count: 16.60 (03-03-22 @ 02:53)  WBC Count: 22.54 (03-02-22 @ 04:00)  WBC Count: 21.24 (03-01-22 @ 17:38)  WBC Count: 24.36 (03-01-22 @ 10:49)      Creatine Trend:  Creatinine, Serum: 0.99 (03-03)  Creatinine, Serum: 0.94 (03-02)  Creatinine, Serum: 0.97 (03-01)  Creatinine, Serum: 1.01 (03-01)      Liver Biochemical Testing Trend:  Alanine Aminotransferase (ALT/SGPT): 17 (03-03)  Alanine Aminotransferase (ALT/SGPT): 22 (03-02)  Alanine Aminotransferase (ALT/SGPT): 30 (03-01)  Alanine Aminotransferase (ALT/SGPT): 40 (03-01)  Alanine Aminotransferase (ALT/SGPT): 47 *H* (02-28)  Aspartate Aminotransferase (AST/SGOT): 17 (03-03-22 @ 02:53)  Aspartate Aminotransferase (AST/SGOT): 21 (03-02-22 @ 03:59)  Aspartate Aminotransferase (AST/SGOT): 29 (03-01-22 @ 17:38)  Aspartate Aminotransferase (AST/SGOT): 58 (03-01-22 @ 00:08)  Aspartate Aminotransferase (AST/SGOT): 82 (02-28-22 @ 15:47)  Bilirubin Total, Serum: 0.3 (03-03)  Bilirubin Total, Serum: 0.4 (03-02)  Bilirubin Total, Serum: 0.4 (03-01)  Bilirubin Total, Serum: 0.5 (03-01)  Bilirubin Total, Serum: 0.5 (02-28)      Trend LDH  03-03-22 @ 02:53  179  03-02-22 @ 03:59  211  03-01-22 @ 00:08  199  02-27-22 @ 23:13  381<H>  01-30-22 @ 06:16  227          MICROBIOLOGY:  Vancomycin Level, Random: 8.5 (02-28 @ 04:53)    MRSA PCR Result.: NotDetec (02-28-22 @ 04:25)  MRSA PCR Result.: NotDetec (12-13-21 @ 07:59)  MRSA PCR Result.: NotDetec (10-17-21 @ 21:55)  MRSA PCR Result.: NotDetec (06-14-21 @ 23:02)      Culture - Blood (collected 28 Feb 2022 04:26)  Source: .Blood Blood-Peripheral  Preliminary Report:    Growth in aerobic bottle: Serratia marcescens    See previous culture [id number]    Culture - Blood (collected 28 Feb 2022 01:33)  Source: .Blood Blood-Peripheral  Preliminary Report:    Growth in aerobic bottle: Serratia marcescens    Growth in anaerobic bottle: Gram Negative Rods    ***Blood Panel PCR results on this specimen are available    approximately 3 hours after the Gram stain result.***    Gram stain, PCR, and/or culture resultsmay not always    correspond due to difference in methodologies.    ************************************************************    This PCR assay was performed by multiplex PCR. This    Assay tests for 66 bacterial and resistance gene targets.    Please referto the [facility] test directory    at [email or web address] for details.  Organism: Blood Culture PCR  Serratia marcescens  Organism: Serratia marcescens    Sensitivities:      -  Amikacin: S <=16      -  Ampicillin: R >16 These ampicillin results predict results for amoxicillin      -  Ampicillin/Sulbactam: R >16/8 Enterobacter, Klebsiella aerogenes, Citrobacter, and Serratia may develop resistance during prolonged therapy (3-4 days)      -  Aztreonam: S <=4      -  Cefazolin: R >16 Enterobacter, Klebsiella aerogenes, Citrobacter, and Serratia may develop resistance during prolonged therapy (3-4 days)      -  Cefepime: S <=2      -  Cefoxitin: R >16      -  Ceftriaxone: R 8 Enterobacter, Klebsiella aerogenes, Citrobacter, and Serratia may develop resistance during prolonged therapy      -  Ciprofloxacin: R >2      -  Ertapenem: S <=0.5      -  Gentamicin: S <=2      -  Levofloxacin: R 4      -  Meropenem: S <=1      -  Piperacillin/Tazobactam: S <=8      -  Tobramycin: S <=2      -  Trimethoprim/Sulfamethoxazole: S 2/38      Method Type: CLAU  Organism: Blood Culture PCR    Sensitivities:      -  Serratia marcescens: Detec      Method Type: PCR    Culture - Blood (collected 14 Jan 2022 10:22)  Source: .Blood Blood  Final Report:    No Growth Final    Culture - Blood (collected 14 Jan 2022 10:22)  Source: .Blood Blood  Final Report:    No Growth Final    Culture - Blood (collected 13 Jan 2022 22:53)  Source: .Blood Blood  Final Report:    No Growth Final    Culture - Blood (collected 11 Jan 2022 23:02)  Source: .Blood Blood-Venous  Final Report:    Growth in aerobic and anaerobic bottles: Serratia marcescens    ***Blood Panel PCR results on this specimen are available    approximately 3 hours after the Gram stain result.***    Gram stain, PCR, and/or culture results may not always    correspond due to difference in methodologies.    ************************************************************    This PCR assay was performed by multiplex PCR. This    Assay tests for 66 bacterial and resistance gene targets.    Please refer to the [facility] test directory    at [email or web address] for details.  Organism: Blood Culture PCR  Serratia marcescens  Organism: Serratia marcescens    Sensitivities:      -  Amikacin: S <=16      -  Ampicillin: R >16 These ampicillin results predict results for amoxicillin      -  Ampicillin/Sulbactam: R >16/8 Enterobacter, Klebsiella aerogenes, Citrobacter, and Serratia may develop resistance during prolonged therapy (3-4 days)      -  Aztreonam: S <=4      -  Cefazolin: R >16 Enterobacter, Klebsiella aerogenes, Citrobacter, and Serratia may develop resistance during prolonged therapy (3-4 days)      -  Cefepime: S <=2      -  Cefoxitin: R >16      -  Ceftriaxone: S <=1 Enterobacter, Klebsiella aerogenes, Citrobacter, and Serratia may develop resistance during prolonged therapy      -  Ciprofloxacin: R >2      -  Ertapenem: S <=0.5      -  Gentamicin: S <=2      -  Levofloxacin: R 2      -  Meropenem: S <=1      -  Piperacillin/Tazobactam: S <=8      -  Tobramycin: S <=2      -  Trimethoprim/Sulfamethoxazole: S 2/38      Method Type: CLAU  Organism: Blood Culture PCR    Sensitivities:      -  Serratia marcescens: Detec      Method Type: PCR    Culture - Blood (collected 30 Dec 2021 22:07)  Source: .Blood Blood  Final Report:    No Growth Final    Culture - Blood (collected 18 Dec 2021 08:15)  Source: .Blood Blood  Final Report:    No Growth Final    Culture - Blood (collected 13 Dec 2021 05:50)  Source: .Blood Arterial Catheter  Final Report:    Growth in aerobic and anaerobic bottles: Serratia marcescens  Organism: Serratia marcescens  Organism: Serratia marcescens    Sensitivities:      -  Amikacin: S <=16      -  Ampicillin: R >16 These ampicillin results predict results for amoxicillin      -  Ampicillin/Sulbactam: R >16/8 Enterobacter, Klebsiella aerogenes, Citrobacter, and Serratia may develop resistance during prolonged therapy (3-4 days)      -  Aztreonam: S <=4      -  Cefazolin: R >16 Enterobacter, Klebsiella aerogenes, Citrobacter, and Serratia may develop resistance during prolonged therapy (3-4 days)      -  Cefepime: S <=2      -  Cefoxitin: R >16      -  Ceftriaxone: S <=1 Enterobacter, Klebsiella aerogenes, Citrobacter, and Serratia may develop resistance during prolonged therapy      -  Ciprofloxacin: R >2      -  Ertapenem: S <=0.5      -  Gentamicin: S <=2      -  Levofloxacin: R 2      -  Meropenem: S <=1      -  Piperacillin/Tazobactam: S <=8      -  Tobramycin: S 4      -  Trimethoprim/Sulfamethoxazole: S 2/38      Method Type: CLAU    Culture - Blood (collected 13 Dec 2021 05:50)  Source: .Blood Blood-Peripheral  Final Report:    Growth in aerobic bottle: Serratia marcescens See previous culture    [id number]    ***Blood Panel PCR results on this specimen are available    approximately 3 hours after the Gram stain result.***    Gram stain, PCR, and/or culture results may not always    correspond due to difference in methodologies.    ************************************************************    This PCR assay was performed by multiplex PCR. This    Assay tests for 66 bacterial and resistance gene targets.    Please refer to the [facility] test directory    at [email or web address] for details.  Organism: Blood Culture PCR  Organism: Blood Culture PCR    Sensitivities:      -  Serratia marcescens: Detec      Method Type: PCR      HIV-1/2 Combo Result: Nonreact (01-14-21 @ 08:18)    ABS CD4: 129 /uL (04-07-20 @ 08:38)    Legionella Antigen, Urine: Negative (03-01 @ 04:13)    COVID-19 PCR: NotDetec (02-01-22 @ 13:13)  COVID-19 PCR: NotDetec (01-30-22 @ 06:10)  COVID-19 PCR: NotDetec (01-03-22 @ 05:55)    COVID-19 [place] Domain AB Interp: Positive (12-13-21 @ 04:49)  COVID-19 Nucleocapsid PAT AB Interp: Positive (12-13-21 @ 04:49)  COVID-19 [place] Domain AB Interp: Positive (06-15-21 @ 10:15)    Procalcitonin, Serum: 6.34 (03-01)  Procalcitonin, Serum: 10.93 (02-27)      Ferritin, Serum: 1828 (02-28)      Lactate Dehydrogenase, Serum: 179 (03-03)  Lactate Dehydrogenase, Serum: 211 (03-02)  Lactate Dehydrogenase, Serum: 199 (03-01)  Lactate Dehydrogenase, Serum: 381 (02-27)    Serum Pro-Brain Natriuretic Peptide: 19390 (03-03)  Serum Pro-Brain Natriuretic Peptide: 99278 (03-02)  Serum Pro-Brain Natriuretic Peptide: 5486 (02-27)    Troponin T, High Sensitivity Result: 24 (02-27)    Blood Gas Arterial, Lactate: 1.1 (03-03 @ 02:50)  Blood Gas Arterial, Lactate: 0.6 (03-02 @ 03:55)  Blood Gas Arterial, Lactate: 1.2 (03-01 @ 23:11)  Lactate, Blood: 1.0 (03-01 @ 17:38)      RADIOLOGY:  imaging below personally reviewed    < from: CT Head No Cont (02.28.22 @ 13:05) >  IMPRESSION:    No hydrocephalus, acute intracranial hemorrhage, mass effect, or brain   edema.    --- End of Report ---    < end of copied text >

## 2022-03-03 NOTE — PROGRESS NOTE ADULT - SUBJECTIVE AND OBJECTIVE BOX
Subjective:  - NAEO  - Family meeting scheduled this morning    Medications:  cefepime   IVPB 1000 milliGRAM(s) IV Intermittent every 12 hours  chlorhexidine 2% Cloths 1 Application(s) Topical <User Schedule>  cholecalciferol 1000 Unit(s) Oral daily  insulin lispro (ADMELOG) corrective regimen sliding scale   SubCutaneous every 6 hours  lidocaine   Infusion 1 mG/Min IV Continuous <Continuous>  methimazole 10 milliGRAM(s) Oral daily  metoclopramide 10 milliGRAM(s) Oral four times a day  multivitamin 1 Tablet(s) Oral daily  pantoprazole  Injectable 40 milliGRAM(s) IV Push every 12 hours  senna 2 Tablet(s) Oral at bedtime  sertraline 100 milliGRAM(s) Oral daily  vasopressin Infusion 0.02 Unit(s)/Min IV Continuous <Continuous>      ICU Vital Signs Last 24 Hrs  T(C): 37.5, Max: 37.5 ( @ 19:00)  HR: 92 (65 - 121)  BP: --  BP(mean): 83 (83 - 83)  ABP: 85/69 (56/48 - 105/86)  ABP(mean): 76 (52 - 95)  RR: 27 (19 - 35)  SpO2: 100% (96% - 100%)    Weight in k.5 (22)  Weight in k.3 (22)      I&O's Summary Last 24 Hrs    IN: 1524.1 mL / OUT: 1189 mL / NET: 335.1 mL      Tele: SR/ST 70-120s    Physical Exam:    General: Frail. No acute distress.  HEENT: EOM intact. Open prior tracheostomy site without drainage  Neck: Neck supple. JVP mildly elevated.  Chest: Clear to auscultation bilaterally  CV: LVAD hum. No palpable pulses.  Abdomen: Soft, non-distended, non-tender. LVAD driveline site with dressing c/d/i. perc choley drain in place with bilious output.  Skin: warm peripherally.  Neurology: Awake, does not follow commands  Psych: MARELY    LVAD Heart Mate 2 Interrogation  Speed 9200  Flow 5.3  Power 5.6  PI 5.5  no events  DLES dressing C/D/I    Labs:    ( 22 @ 02:53 )               7.8    16.60 )--------( 86                   24.8     ( 22 @ 02:53 )     136  |  105  |  15  ---------------------<  198  3.8  |  21  |  0.99    Ca 8.8  Phos 1.9  Mg 1.9    ( 22 @ 02:53 )  TPro  7.2  /  Alb  2.8  /  TBili  0.3  /  DBili  x   /  AST  17  /  ALT  17  /  AlkPhos  128  ( 22 @ 03:59 )  TPro  7.1  /  Alb  2.7  /  TBili  0.4  /  DBili  x   /  AST  21  /  ALT  22  /  AlkPhos  120    PTT/PT/INR - ( 22 @ 02:53 )  PTT: 28.2 sec / PT: 15.2 sec / INR: 1.32 ratio    ( 22 @ 23:13 )  TropHS 24    / CK x     / CKMB x        Serum Pro-Brain Natriuretic Peptide: 16866 (22 @ 02:53)    ABG - ( 22 @ 02:50 )  pH: 7.38  / pCO2: 41    / pO2: 155   / HCO3: 24    / Base Excess: -0.8  / SaO2: 99.9     Lactate Dehydrogenase, Serum: 179 (22 @ 02:53)  Lactate Dehydrogenase, Serum: 211 (22 @ 03:59)

## 2022-03-03 NOTE — PROGRESS NOTE ADULT - PROBLEM SELECTOR PLAN 1
2/28 BCx + serratia/GNR  - IV abx per ID. currently on cefepime.  - CT chest/A/P pending per ID  - Wean vasopressin as tolerated for MAP of 70  - home mirtazapine and gabapentin discontinued given lethargy  - palliative care consult to [facility] with plan for family meeting today at 11AM

## 2022-03-03 NOTE — PROGRESS NOTE ADULT - ASSESSMENT
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 s/p HM2 LVAD in 9/2017 at  (due to severe PAD) with TV ring, multiple GI bleeds, thus maintained off AC, CKD 3prior COVID-19 infection in 4/2020, recurrent syncope post LVAD s/p ILR, s/p prolonged complex hospitalization from 6/14-11/16/2021 initially admitted with abdominal pain complicated by GIB, respiratory failure s/p trach, multiple infections, s/p cholecystotomy tube and SMA stent 10/2021, ultimately discharged to [facility] rehab and then returned to [facility] for trach decannulation 12/2, readmitted in 2/2022 with recurrent COVID-19 infection, initially in distributive shock. Blood cultures were also positive for serratia marcescens which he has had in the past.   Pt was transferred to [facility] CICU from [facility]. He was sent to their ED from a rehab center due to AMS and tachycardia. At [facility], they noted a WBC greater than 20, tachycardia and hypercarbia on ABG. Pt was placed on BiPAP and transferred to [facility] CICU for  sepsis vs septic shock. Geriatrics and Palliative Medicine (GaP) Team was called for goals of care

## 2022-03-03 NOTE — PROGRESS NOTE ADULT - ATTENDING COMMENTS
no significant change.   MS not returned to baseline. undergoing EEG, awaiting head CT and pan scanning.   no further VT. enteral nutrition.   meds; vaso .04, lido 1, IV cefipime, off mitodrine, albumin. NOT GETTING MEXILITENE   LVAD 9200, flow 5.3, PI 5.5, Power 5.6.   HR 70-120SR, MAPs 67-77.CVP 10-11,   TTE 2.28: LVEDD 4.1, LVEF 10, normal RV size, decreased function. mod MR. Av closed. mild cont AI. mod TR.   I/o: +400  03-03  136  |  105  |  15  ----------------------------<  198<H>  3.8   |  21<L>  |  0.99    Ca    8.8      03 Mar 2022 02:53  Phos  1.9     03-03  Mg     1.9     03-03  TPro  7.2  /  Alb  2.8<L>  /  TBili  0.3  /  DBili  x   /  AST  17  /  ALT  17  /  AlkPhos  128<H>  03-03                        7.8    16.60 )-----------( 86       ( 03 Mar 2022 02:53 )             24.8   For family discussion re goals of care this morning .   Head, chest abdo CT.   IS THERE A REASON PATIENT SHOULD NOT BE ON PO MEXILITENE  maintain I=O, would not allow CVP to climb above 10-12.   Zi Werner

## 2022-03-03 NOTE — PROGRESS NOTE ADULT - ATTENDING COMMENTS
Interval history as per resident note, personally verified by me. Mentation is somewhat improved but still remains compromised. Connected to vEEG at bedside.    PMHx/PSHx:  PMH non-ischemic cardiomyopathy s/p LVAD, multiple GI bleeds, CKD, Covid-19 infections, recurrent serratia bacteriemia    FHx: Due to clinical condition unable to assess (MARELY)    SHx: MARELY    ROS: MARELY    MS - Awake, attends to tactile stimuli in all extremities with possibly auditory but no clear visual, no speech output, does not follow commands. MARELY orientation, rep/naming, attn/conc, recent and remote memory, fund of knowledge  CN - PERRL, EOMI by OCR, (+) face sens/str by corneals b/l, (+) spontaneous respirations, SCM at least 4+/5 b/l and symmetric, VF dec as he does not blink to threat. MARELY hearing, tongue/palate  Motor - Decreased bulk, mild paratonia of BUE's and normal tone of BLE's. No spontaneous movements. He does have some possibly protective movements of arms with BUE's at least 2/5 proximally and 3/5 distally and symmetric and he does grimace to strong noxious stimuli. He does grimace and withdraw to strong noxious stimuli in BLE's 1-2/5  Sens - As above  DTR's - 2+ BUE's, 3+ R KJ, 0+ L KJ, 0+ AJ b/l, and neutral b/l plantar response  Coord - MARELY  Gait and station - MARELY    Pertinent labs/studies:  BMP essentially WNL  Mg WNL, Phos dec 1.9  Albumin dec 2.8, LFT's with inc alk phos 128  CBC with inc WBC 16.6 and inc PMN's 90.1%, low H/H 8/25, and dec plt 86    vEEG 3/3 -  Abnormal EEG   - Moderate generalized slowing.    A/P:  Encephalopathy  Sepsis  Recent COVID-19 infection  Non-ischemic cardiomyopathy with LVAD  CKD stage 3  Thrombocytopenia (plt 86)  Vitamin D deficiency (17.8)    - Etiology for encephalopathy is most likely due to ongoing medical problems with toxic/metabolic factors but needed to exclude neurologic specific causes such as seizures. Less likely cerebrovascular but does have some asymmetry of DTR's on exam so agree with excluding this. Recent CT head unrevealing but cannot get MRI due to LVAD  - Agree with repeat CT head w/o  - vEEG with only generalized slowing and no evidence for focal slowing, epileptiform discharges, or seizures; STOP  - Await labs for additional causes with B1, NH3  - Vitamin D levels low, would replete with D2 66826 Units PO weekly for 8 weeks and then recheck new levels  - Continue to address above medical problems, as you are doing  - Will continue to follow patient peripherally with you, please call with additional questions or concerns

## 2022-03-03 NOTE — PROGRESS NOTE ADULT - PROBLEM SELECTOR PLAN 3
- holding BB and spironolactone  - CVP low, s/p albumin and IVF. Target CVP < 10 - holding BB and spironolactone  - CVP low yesterday, s/p albumin and IVF; this morning was 10-11  - maintain I=O, would not allow CVP to climb above 10-12

## 2022-03-03 NOTE — PROGRESS NOTE ADULT - PROBLEM SELECTOR PLAN 4
- lidocaine gtt resumed  - no further NSVT with lidocaine back on  - EP following - lidocaine gtt resumed  - no further NSVT with lidocaine back on  - EP following; would resume mexiletine

## 2022-03-03 NOTE — PROGRESS NOTE ADULT - SUBJECTIVE AND OBJECTIVE BOX
PATIENT:  RICKY JOINT  [id number]    CHIEF COMPLAINT:  Patient is a 66y old  Male who presents with a chief complaint of Septic shock (02 Mar 2022 20:18)        REVIEW OF SYSTEMS:    Constitutional:     [ ] negative [ ] fevers [ ] chills [ ] weight loss [ ] weight gain  HEENT:                  [ ] negative [ ] dry eyes [ ] eye irritation [ ] postnasal drip [ ] nasal congestion  CV:                         [ ] negative  [ ] chest pain [ ] orthopnea [ ] palpitations [ ] murmur  Resp:                     [ ] negative [ ] cough [ ] shortness of breath [ ] dyspnea [ ] wheezing [ ] sputum [ ] hemoptysis  GI:                          [ ] negative [ ] nausea [ ] vomiting [ ] diarrhea [ ] constipation [ ] abd pain [ ] dysphagia   :                        [ ] negative [ ] dysuria [ ] nocturia [ ] hematuria [ ] increased urinary frequency  Musculoskeletal: [ ] negative [ ] back pain [ ] myalgias [ ] arthralgias [ ] fracture  Skin:                       [ ] negative [ ] rash [ ] itch  Neurological:        [ ] negative [ ] headache [ ] dizziness [ ] syncope [ ] weakness [ ] numbness  Psychiatric:           [ ] negative [ ] anxiety [ ] depression  Endocrine:            [ ] negative [ ] diabetes [ ] thyroid problem  Heme/Lymph:      [ ] negative [ ] anemia [ ] bleeding problem  Allergic/Immune: [ ] negative [ ] itchy eyes [ ] nasal discharge [ ] hives [ ] angioedema    [ ] All other systems negative  [ x] Unable to assess ROS because _AMS _______.    MEDICATIONS:  MEDICATIONS  (STANDING):  cefepime   IVPB 1000 milliGRAM(s) IV Intermittent every 12 hours  chlorhexidine 2% Cloths 1 Application(s) Topical <User Schedule>  cholecalciferol 1000 Unit(s) Oral daily  dextrose 40% Gel 15 Gram(s) Oral once  dextrose 50% Injectable 25 Gram(s) IV Push once  glucagon  Injectable 1 milliGRAM(s) IntraMuscular once  insulin lispro (ADMELOG) corrective regimen sliding scale   SubCutaneous every 6 hours  lidocaine   Infusion 1 mG/Min (7.5 mL/Hr) IV Continuous <Continuous>  methimazole 10 milliGRAM(s) Oral daily  metoclopramide 10 milliGRAM(s) Oral four times a day  multivitamin 1 Tablet(s) Oral daily  pantoprazole  Injectable 40 milliGRAM(s) IV Push daily  senna 2 Tablet(s) Oral at bedtime  sertraline 100 milliGRAM(s) Oral daily  vasopressin Infusion 0.02 Unit(s)/Min (1.2 mL/Hr) IV Continuous <Continuous>    MEDICATIONS  (PRN):      ALLERGIES:  Allergies    Amiodarone Hydrochloride (Other (Severe))    Intolerances        OBJECTIVE:  ICU Vital Signs Last 24 Hrs  T(C): 36.8 (03 Mar 2022 03:00), Max: 37.5 (02 Mar 2022 19:00)  T(F): 98.2 (03 Mar 2022 03:00), Max: 99.5 (02 Mar 2022 19:00)  HR: 118 (03 Mar 2022 05:30) (65 - 118)  BP: 93/70 (02 Mar 2022 10:00) (86/58 - 93/70)  BP(mean): 83 (02 Mar 2022 20:00) (68 - 83)  ABP: 98/81 (03 Mar 2022 05:30) (56/48 - 105/86)  ABP(mean): 89 (03 Mar 2022 05:30) (52 - 95)  RR: 27 (03 Mar 2022 05:30) (16 - 34)  SpO2: 99% (03 Mar 2022 05:30) (96% - 100%)      Adult Advanced Hemodynamics Last 24 Hrs  CVP(mm Hg): 285 (03 Mar 2022 05:30) (1 - 285)  CVP(cm H2O): --  CO: --  CI: --  PA: --  PA(mean): --  PCWP: --  SVR: --  SVRI: --  PVR: --  PVRI: --  CAPILLARY BLOOD GLUCOSE      POCT Blood Glucose.: 196 mg/dL (03 Mar 2022 06:30)  POCT Blood Glucose.: 231 mg/dL (03 Mar 2022 01:33)  POCT Blood Glucose.: 137 mg/dL (02 Mar 2022 17:21)  POCT Blood Glucose.: 167 mg/dL (02 Mar 2022 11:26)    CAPILLARY BLOOD GLUCOSE      POCT Blood Glucose.: 196 mg/dL (03 Mar 2022 06:30)    I&O's Summary    01 Mar 2022 07:01  -  02 Mar 2022 07:00  --------------------------------------------------------  IN: 2578.8 mL / OUT: 1442 mL / NET: 1136.8 mL    02 Mar 2022 07:01  -  03 Mar 2022 06:47  --------------------------------------------------------  IN: 1433 mL / OUT: 1039 mL / NET: 394 mL      Daily     Daily Weight in k.5 (03 Mar 2022 05:00)    PHYSICAL EXAMINATION:  General: WN/WD NAD  HEENT: PERRLA, EOMI, moist mucous membranes  Neurology: A&Ox3, nonfocal, BLANDON x 4  Respiratory: CTA B/L, normal respiratory effort, no wheezes, crackles, rales  CV: RRR, S1S2, no murmurs, rubs or gallops  Abdominal: Soft, NT, ND +BS, Last BM  Extremities: No edema, + peripheral pulses  Incisions:   Tubes:    LABS:  ABG - ( 03 Mar 2022 02:50 )  pH, Arterial: 7.38  pH, Blood: x     /  pCO2: 41    /  pO2: 155   / HCO3: 24    / Base Excess: -0.8  /  SaO2: 99.9                                    7.8    16.60 )-----------( 86       ( 03 Mar 2022 02:53 )             24.8     03-    136  |  105  |  15  ----------------------------<  198<H>  3.8   |  21<L>  |  0.99    Ca    8.8      03 Mar 2022 02:53  Phos  1.9     03-03  Mg     1.9     03-03    TPro  7.2  /  Alb  2.8<L>  /  TBili  0.3  /  DBili  x   /  AST  17  /  ALT  17  /  AlkPhos  128<H>  03-03    LIVER FUNCTIONS - ( 03 Mar 2022 02:53 )  Alb: 2.8 g/dL / Pro: 7.2 g/dL / ALK PHOS: 128 U/L / ALT: 17 U/L / AST: 17 U/L / GGT: x           PT/INR - ( 03 Mar 2022 02:53 )   PT: 15.2 sec;   INR: 1.32 ratio         PTT - ( 03 Mar 2022 02:53 )  PTT:28.2 sec            ====================ASSESSMENT ==============  65M PMH LVAD, recurrent serratia bacteremia, pneumonia, intubated/trach x2 cycles, chronic gall bladder disease s/p percutaneous cholecystectomy, SMA ischemia s/p stent, cachexia who presented from [facility] for septic shock    Plan:  ====================== NEUROLOGY=====================  AMS  - per nursing home staff pt has been altered for one week  - pt AAOx0, not verbally responding to staff  - pt following commands  - continue to monitor neuro status  - CTH at OSH negative for acute pathology   -  CTH: Mild to mod white matter microvascular ischemic disease. No acute pathology   - unable to get Brain MRI 2/2 LVAD. consider repeat CTH in a few days if AMS unchanges  - neuro following  - ongoing vEEG to r/o seizures  - no s/s of any subclinical seizures, continue to monitor   - Will rpt CTH with pan-scan       sertraline 100 milliGRAM(s) Oral daily    ==================== RESPIRATORY======================  Hypercarbia  - Pt hypercarbic at OSH, placed on BiPAP, now weaned off  - appears comfortable on NC 3L this AM  - continue to monitor respiratory status   - wean NC as tolerated with goal SpO2 >94%    Hx trach w/ stoma  - Pt trach x2 cycles in past  - most recently in setting of COVID PNA in 2022  - Pt decannulated after admission  - purulent drainage in stoma site, cultured pending results  - ENT states no sign of infection at Stoma site- Plan for possible TE fistula closure when pt is stable as per ENT      ====================CARDIOVASCULAR==================  ACC/AHA stage D systolic heart failure s/p LVAD  - pt appeared hypovolemic on admission  - weaned off vaso gtt at 10pm 3/1 s/p fluid resuscitation  - Pt off pressors at the moment  - central sat sent from intro 81.8 with negative lactate x4  - LVAD coordinator and HF attending contacted     Ventricular Tachycardia  - pt has history of VT on previous admissions, takes home mexiletine  - significant NSVT and VT seen on tele in CICU overnight and this AM  - s/p lido bolus 100mg and 50mg and started on lido gtt restarted and holding home Mexiletine as per EP  - F/U further final EP recs       lidocaine   Infusion 1 mG/Min (7.5 mL/Hr) IV Continuous <Continuous>  vasopressin Infusion 0.04 Unit(s)/Min (2.4 mL/Hr) IV Continuous <Continuous>  ===================HEMATOLOGIC/ONC ===================  Anemia  - Hgb on admission 7.4 with subsequent drop to 6.6  - s/p 2u PRBC . Now h/h has been stable >48hrs. Will check cbc q12-24  - of note, pt has hx of GI bleeds  - no blood noted in BM, continue to monitor   - transfuse as needed if hgb <7      ===================== RENAL =========================  RASHAWN, now resolved   - continue to monitor SCr, BUN, lytes, and I&Os  - replete lytes prn with goal K 4-4.5 and mg >2      ==================== GASTROINTESTINAL===================  parHx GI bleed in past req multiple transfusions   - no overt bleeding noted on this admission   - monitor BM for signs of blood. h/h stable for past 24 hrs without any blood noted in BM  - c/w PPI  - Bowel regimen with Senna    Diet  - Jevity 40cc/hr-> incr to 55     cholecalciferol 1000 Unit(s) Oral daily  metoclopramide 10 milliGRAM(s) Oral four times a day  multivitamin 1 Tablet(s) Oral daily  G prophylaxis, pantoprazole  Injectable 40 milliGRAM(s) IV Push daily  senna 2 Tablet(s) Oral at bedtime    =======================    ENDOCRINE  =====================  Hx Hyperthyroidism   - continue home methimazole     maintain q6h ISS while on continuous enteral feeds     dextrose 40% Gel 15 Gram(s) Oral once  dextrose 50% Injectable 25 Gram(s) IV Push once  glucagon  Injectable 1 milliGRAM(s) IntraMuscular once  insulin lispro (ADMELOG) corrective regimen sliding scale   SubCutaneous every 6 hours  methimazole 10 milliGRAM(s) Oral daily      ========================INFECTIOUS DISEASE================  Septic shock  - pt arrived tachycardic 120s, hypotensive MAPs 50-60, AMS  - pt got zosyn and vancomycin x1 at OSH. switched from zosyn to cefepime. s/p vanco-, d/c yesterday as BCX with serratia.   - f/u infectious workup. BCX  gram neg rods x4 bottles-- growing serratia. pending sensitives   - f/u stoma culture sent   - CT C/A/P at OSH: cholecystostomy in place, patchy opacities vs. atelectasis as per written transfer report (CD and official report not sent with transfer)   - OSH BCX prelim gram neg rods in anaerobic bottle. continue to follow [facility] (BCX taken prior to abx initiation)  - ID following- recommending repeat CT C/A/P w/ con to look for source   - c/w Cefepime 1g q12h -       cefepime   IVPB 1000 milliGRAM(s) IV Intermittent every 12 hours

## 2022-03-03 NOTE — PROGRESS NOTE ADULT - SUBJECTIVE AND OBJECTIVE BOX
RICKY HAMPTON  MRN-[id number]  Patient is a 66y old  Male who presents with a chief complaint of Septic shock (03 Mar 2022 17:09)    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 s/p HM2 LVAD in 9/2017 at  (due to severe PAD) with TV ring, multiple GI bleeds, thus maintained off AC, CKD 3prior COVID-19 infection in 4/2020, recurrent syncope post LVAD s/p ILR, s/p prolonged complex hospitalization from 6/14-11/16/2021 initially admitted with abdominal pain complicated by GIB, respiratory failure s/p trach, multiple infections, s/p cholecystotomy tube and SMA stent 10/2021, ultimately discharged to [facility] rehab and then returned to [facility] for trach decannulation 12/2, readmitted in 2/2022 with recurrent COVID-19 infection, initially in distributive shock. Blood cultures were also positive for serratia marcescens which he has had in the past.     Pt was transferred to [facility] CICU from [facility]. He was sent to their ED from a rehab center due to AMS and tachycardia. At [facility], they noted a WBC greater than 20, tachycardia and hypercarbia on ABG. Pt was placed on BiPAP and transferred to [facility] CICU for concern for sepsis vs septic shock.      (27 Feb 2022 22:20)      Surgery/Hospital course:    Overnight events:    REVIEW OF SYSTEMS:    CONSTITUTIONAL: No weakness, fevers or chills  EYES/ENT: No visual changes;  No vertigo or throat pain   NECK: No pain or stiffness  RESPIRATORY: No cough, wheezing, hemoptysis; No shortness of breath  CARDIOVASCULAR: No chest pain or palpitations  GASTROINTESTINAL: No abdominal or epigastric pain. No nausea, vomiting, or hematemesis; No diarrhea or constipation. No melena or hematochezia.  GENITOURINARY: No dysuria, frequency or hematuria  NEUROLOGICAL: No numbness or weakness  SKIN: No itching, rashes      Physical Exam:  Vital Signs Last 24 Hrs  T(C): 36.9 (03 Mar 2022 15:00), Max: 37.5 (02 Mar 2022 23:00)  T(F): 98.4 (03 Mar 2022 15:00), Max: 99.5 (02 Mar 2022 23:00)  HR: 110 (03 Mar 2022 18:45) (86 - 123)  BP: --  BP(mean): 83 (02 Mar 2022 20:00) (83 - 83)  RR: 26 (03 Mar 2022 18:45) (20 - 35)  SpO2: 99% (03 Mar 2022 18:45) (96% - 100%)  Physical Exam:   Constitutional: NAD, well-groomed, well-developed  HEENT: PERRLA, EOMI, no drainage or redness  Neck: supple,  No JVD  Respiratory: Breath Sounds equal & clear bilaterally to auscultation, no rales/rhonchi/wheezing, no accessory muscle use noted  Cardiovascular: Regular rate, regular rhythm, normal S1, S2; no murmurs or rub  Gastrointestinal: Soft, non-tender, non distended, + bowel sounds  Extremities: BLANDON x 4, no peripheral edema, no cyanosis, no clubbing   Neurological: A+O x 3; speech clear and intact; no sensory, motor  deficits, normal reflexes  Skin: warm, dry, well perfused  Incisions:    ============================I/O===========================   I&O's Detail    02 Mar 2022 07:01  -  03 Mar 2022 07:00  --------------------------------------------------------  IN:    Enteral Tube Flush: 360 mL    IV PiggyBack: 100 mL    Jevity 1.2: 840 mL    Lidocaine: 172.5 mL    Vasopressin: 3.6 mL    Vasopressin: 37.2 mL    Vasopressin: 2.4 mL    Vasopressin: 8.4 mL  Total IN: 1524.1 mL    OUT:    Drain (mL): 35 mL    Voided (mL): 1154 mL  Total OUT: 1189 mL    Total NET: 335.1 mL      03 Mar 2022 07:01  -  03 Mar 2022 19:49  --------------------------------------------------------  IN:    Albumin 5%  - 250 mL: 250 mL    Enteral Tube Flush: 120 mL    Jevity 1.2: 570 mL    Lidocaine: 82.5 mL    Vasopressin: 19.2 mL  Total IN: 1041.7 mL    OUT:    Drain (mL): 0 mL    Voided (mL): 725 mL  Total OUT: 725 mL    Total NET: 316.7 mL        ============================ LABS =========================                        7.8    16.60 )-----------( 86       ( 03 Mar 2022 02:53 )             24.8     03-03    136  |  105  |  15  ----------------------------<  198<H>  3.8   |  21<L>  |  0.99    Ca    8.8      03 Mar 2022 02:53  Phos  1.9     03-03  Mg     1.9     03-03    TPro  7.2  /  Alb  2.8<L>  /  TBili  0.3  /  DBili  x   /  AST  17  /  ALT  17  /  AlkPhos  128<H>  03-03    LIVER FUNCTIONS - ( 03 Mar 2022 02:53 )  Alb: 2.8 g/dL / Pro: 7.2 g/dL / ALK PHOS: 128 U/L / ALT: 17 U/L / AST: 17 U/L / GGT: x           PT/INR - ( 03 Mar 2022 02:53 )   PT: 15.2 sec;   INR: 1.32 ratio         PTT - ( 03 Mar 2022 02:53 )  PTT:28.2 sec  ABG - ( 03 Mar 2022 02:50 )  pH, Arterial: 7.38  pH, Blood: x     /  pCO2: 41    /  pO2: 155   / HCO3: 24    / Base Excess: -0.8  /  SaO2: 99.9                ======================Micro/Rad/Cardio=================  Culture: Reviewed   CXR: Reviewed  Echo:Reviewed  ======================================================  PAST MEDICAL & SURGICAL HISTORY:  CHF (congestive heart failure)    CAD (coronary artery disease)    Depression    Pleural effusion    History of 2019 novel coronavirus disease (COVID-19)  april 2020    Hemorrhoids    Bleeding hemorrhoids    Peripheral arterial disease    Claudication    BPH with urinary obstruction    ACC/AHA stage D systolic heart failure    Anticoagulation goal of INR 2.0 to 2.5    Falls    Clavicle fracture    CKD (chronic kidney disease), stage III    Iron deficiency anemia    H/O epistaxis    Vertigo    GI bleed    S/P TVR (tricuspid valve repair)    S/P ventricular assist device    S/P endoscopy    H/O tracheostomy      ====================ASSESSMENT ==============  CNS:  RES:  CVS:  Hem:  Sabino:  GI:  Endo:  ID:  Skin  Plan:  ====================== NEUROLOGY=====================  sertraline 100 milliGRAM(s) Oral daily    ==================== RESPIRATORY======================  Mechanical Ventilation:      ====================CARDIOVASCULAR==================  lidocaine   Infusion 1 mG/Min (7.5 mL/Hr) IV Continuous <Continuous>  mexiletine 150 milliGRAM(s) Oral every 8 hours    ===================HEMATOLOGIC/ONC ===================    ===================== RENAL =========================  Continue monitoring urine output    ==================== GASTROINTESTINAL===================  cholecalciferol 1000 Unit(s) Oral daily  metoclopramide 10 milliGRAM(s) Oral four times a day  multivitamin 1 Tablet(s) Oral daily  pantoprazole  Injectable 40 milliGRAM(s) IV Push every 12 hours  senna 2 Tablet(s) Oral at bedtime    =======================    ENDOCRINE  =====================  insulin lispro (ADMELOG) corrective regimen sliding scale   SubCutaneous every 6 hours  methimazole 10 milliGRAM(s) Oral daily  vasopressin Infusion 0.04 Unit(s)/Min (2.4 mL/Hr) IV Continuous <Continuous>    ========================INFECTIOUS DISEASE================  cefepime   IVPB 1000 milliGRAM(s) IV Intermittent every 12 hours      ========================PROPHYLACTIC MEASURE================  DVT  GI Protonix  Graft patency   Beta blocker      Patient requires continuous monitoring with bedside rhythm monitoring, arterial line, pulse oximetry, ventilator monitoring and intermittent blood gas analysis.  Care plan discussed with ICU care team.  Patient remained critical; required more than usual post op care; I have spent 35 minutes providing non-routine post op care, revaluated multiple times during the day.

## 2022-03-03 NOTE — PROGRESS NOTE ADULT - ASSESSMENT
· Assessment	  67 y/o male pt with right heel DTI present on admission  - pt seen and evaluated   - right heel DTI with no signs of infection noted  - rec z flow boots in bed at all times  - rec applying cavilon to right heel daily  - will monitor periodically during this admission, reconsult sooner as necessary

## 2022-03-03 NOTE — PROGRESS NOTE ADULT - PROBLEM SELECTOR PLAN 1
On Abx and pressors as per the primary team and ID  As previously (3/1) d/w ID (Dr. Prater) sources of infection are probably the patient LVAD +/- Splenic abscess that due to the patient's advanced illness, hemodynamic instability, and frail state will not be eligible for source control (as d/w the LVAD team and HF, LVAD can not be removed and, as d/w Dr. Jensen, unlikely to be stable for IR drainage of splenic abscess). Hence, without source control, the patient's infections will continue to reoccur. However,  these recurrent infection are causing an increasing burden on the patient,  creating recurrent admissions, affecting his mental status, limiting his independency, and increasing his chances of mortality. All these element were discussed during a FM today and for details please see the stand alone GOC note enter by the Geriatrics and Palliative Medicine (GaP) Team  (Ashish Conley). However, in summary, GOC are for trying to manage his infection and to continue to do all interventions in order to try to prolong the patient's life and to improve the patient's condition; however, his family agreed with DNR/I.

## 2022-03-03 NOTE — PROGRESS NOTE ADULT - ASSESSMENT
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 s/p HM2 LVAD in 9/2017 at  (due to severe PAD) with TV ring, multiple GI bleeds, thus maintained off AC, CKD 3prior COVID-19 infection in 4/2020, recurrent syncope post LVAD s/p ILR, s/p prolonged complex hospitalization from 6/14-11/16/2021 initially admitted with abdominal pain complicated by GIB, respiratory failure s/p trach, multiple infections, s/p cholecystotomy tube and SMA stent 10/2021, ultimately discharged to [facility] rehab and then returned to [facility] for trach decannulation 12/2. The patient had a recent admission with COVID 19 reinfection and distributive shock. That admission he had blood culture positive for serratia marcescens (discharged on levaquin).  He was treated with MAB, remdesivir, and steroids. He had recurrent VT and was started mexiletine. Now coming in from [facility] ED with leukocytosis and AMS was treated for UTI. Initially required bipap but was weaned off here. Labs concerning here for WC 26, hemoglobin of 7s then 6.7, BNP 5K, creatinine of 1.3. His maps were initially in the 50s. Physical exam showing pus from stoma, sat of central access of 81.8, and tachycardia are all concerning for septic shock picture. He is s/p 1L fluids, 1uPRBCs, and placed on vaso and lidocaine     Upon admission HM2 4.8 LPM, 9200 RPM     Bacteremic with recurrent serratia, on IV abx possibly r/t LVAD infection vs splenic abscess noted on CT 1/19. Afebrile since 3/1. Leukocytosis improving. Low CVP yesterday improved with albumin/IVF. Remains on vasopressors, but last dose of midodrine was yesterday morning. NSVT off lidocaine on mexiletine now improved with resuming lidocaine. Overall, critically ill with guarded prognosis. Plan is for family meeting today. 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 s/p HM2 LVAD in 9/2017 at  (due to severe PAD) with TV ring, multiple GI bleeds, thus maintained off AC, CKD 3prior COVID-19 infection in 4/2020, recurrent syncope post LVAD s/p ILR, s/p prolonged complex hospitalization from 6/14-11/16/2021 initially admitted with abdominal pain complicated by GIB, respiratory failure s/p trach, multiple infections, s/p cholecystotomy tube and SMA stent 10/2021, ultimately discharged to [facility] rehab and then returned to [facility] for trach decannulation 12/2. The patient had a recent admission with COVID 19 reinfection and distributive shock. That admission he had blood culture positive for serratia marcescens (discharged on levaquin).  He was treated with MAB, remdesivir, and steroids. He had recurrent VT and was started mexiletine. Now coming in from [facility] ED with leukocytosis and AMS was treated for UTI. Initially required bipap but was weaned off here. Labs concerning here for WC 26, hemoglobin of 7s then 6.7, BNP 5K, creatinine of 1.3. His maps were initially in the 50s. Physical exam showing pus from stoma, sat of central access of 81.8, and tachycardia are all concerning for septic shock picture. He is s/p 1L fluids, 1uPRBCs, and placed on vaso and lidocaine     Upon admission HM2 4.8 LPM, 9200 RPM     Bacteremic with recurrent serratia, on IV abx possibly r/t LVAD infection vs splenic abscess noted on CT 1/19. Afebrile since 3/1. Leukocytosis improving. Low CVP yesterday improved with albumin/IVF. CVP 10-11 today. Remains on vasopressors, but last dose of midodrine was yesterday morning. NSVT off lidocaine on mexiletine now improved with resuming lidocaine. Overall, critically ill with guarded prognosis. Plan is for family meeting today.

## 2022-03-03 NOTE — PROGRESS NOTE ADULT - ASSESSMENT
67yo man with PMH non-ischemic cardiomyopathy s/p LVAD, multiple GI bleeds, CKD, Covid-19 infections, recurrent serratia bacteriemia with 11 days AMS. Decreased LOC with non-localizing exam. Improved from yesterday with new eye opening and nodding in response to his name.    IMPRESSION: Decreased LOC in s/o bacteremia likely 2/2 diffuse cerebral dysfunction. Etiology uncertain, likely r/o infectious encephalopathy. EEG negative for seizure activity.     Plan:  [] d/c EEG  [] Ammonia, thiamine  []  repeat CTH w/o contrast

## 2022-03-04 NOTE — PROGRESS NOTE ADULT - SUBJECTIVE AND OBJECTIVE BOX
RICKY JOINT  MRN#: [id number]  Subjective: pulmonary progress note  : acute hypercapnic respiratory failure . Septic shock , service rendered on 2002   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 s/p HM2 LVAD in 2017 at  (due to severe PAD) with TV ring, multiple GI bleeds, thus maintained off AC, CKD 3prior COVID-19 infection in 2020, recurrent syncope post LVAD s/p ILR, s/p prolonged complex hospitalization from -2021 initially admitted with abdominal pain complicated by GIB, respiratory failure s/p trach, multiple infections, s/p cholecystotomy tube and SMA stent 10/2021, ultimately discharged to [facility] rehab and then returned to [facility] for trach decannulation , readmitted in 2022 with recurrent COVID-19 infection, initially in distributive shock. Blood cultures were also positive for serratia marcescens which he has had in the past. seen by heart failure team patient is DNR/DNI  continue on 3 liter nasal 02 and vasopressin, family meeting with Palliative care           (2022 22:20)    PAST MEDICAL & SURGICAL HISTORY:  CHF (congestive heart failure)    CAD (coronary artery disease)  Depression    Pleural effusion    History of 2019 novel coronavirus disease (COVID-19)  2020    Hemorrhoids    Bleeding hemorrhoids    Peripheral arterial disease    Claudication    BPH with urinary obstruction    ACC/AHA stage D systolic heart failure    Anticoagulation goal of INR 2.0 to 2.5    Falls    Clavicle fracture    CKD (chronic kidney disease), stage III    Iron deficiency anemia    H/O epistaxis    Vertigo    GI bleed    S/P TVR (tricuspid valve repair)    S/P ventricular assist device    S/P endoscopy    H/O tracheostomy        FAMILY HISTORY:    Social History:    Marital Status:  X    (   ) Single    (   )    (  )   Occupation: Retired   Lives with: (  ) alone  (  ) children  X spouse   (  ) parents  (  ) other    Substance Use (street drugs): X never used  (  ) other:  Tobacco Usage: X never smoked   (   ) former smoker   (   ) current smoker  (     ) pack year  (    ) last cigarette date  Alcohol Usage: Social         OBJECTIVE:  ICU Vital Signs Last 24 Hrs  T(C): 37.2 (01 Mar 2022 16:00), Max: 38.4 (01 Mar 2022 09:00)  T(F): 99 (01 Mar 2022 16:00), Max: 101.1 (01 Mar 2022 09:00)  HR: 109 (01 Mar 2022 18:00) (67 - 124)  BP: --  BP(mean): 86 (2022 20:00) (86 - 86)  ABP: 89/74 (01 Mar 2022 18:00) (75/63 - 160/85)  ABP(mean): 81 (01 Mar 2022 18:00) (68 - 139)  RR: 38 (01 Mar 2022 18:00) (5 - 40)  SpO2: 99% (01 Mar 2022 18:00) (94% - 100%)       @ 07: @ 07:00  --------------------------------------------------------  IN: 1060.7 mL / OUT: 1600 mL / NET: -539.3 mL     @ 07: @ 18:22  --------------------------------------------------------  IN: 677.8 mL / OUT: 455 mL / NET: 222.8 mL    PHYSICAL EXAM :Daily Height in cm: 177.8 (2022 21:20)  Elderly frail mail weaned off  positive pressure non invasive ventilator I/E ratio is14/5 on 3 liter nasal cannula on vasopressin    Daily Weight in k.7 (2022 21:20)  HEENT:     + NCAT  + EOMI  - Conjuctival edema   - Icterus   - Thrush   - ETT  - NGT/OGT  Neck:         + FROM RT IJ  JVD     - Nodes     - Masses    + Mid-line trachea   - Tracheostomy  Chest:           mild kyphosis   Lungs:          + CTA  + Rhonchi  + Rales    - Wheezing  + Decreased BS   - Dullness R L  Cardiac:       + S1 + S2    + RRR   - Irregular   - S3  - S4    - Murmurs   - Rub   - Hamman’s sign   Abdomen:    + BS     + Soft    + Non-tender  - Distended  - Organomegaly  - PEG Cholecystostomy tube in place   Extremities:   - Cyanosis U/L   - Clubbing  U/L  - LE/UE Edema   + Capillary refill    + Pulses   Neuro:        + Awake   +  Alert   - Confused   - Lethargic   - Sedated   - Generalized Weakness  Skin:        - Rashes    - Erythema   + Normal incisions   + IV sites intact  -      HOSPITAL MEDICATIONS: All mediciations reviewed and analyzed  MEDICATIONS  (STANDING):  albumin human  5% IVPB 250 milliLiter(s) IV Intermittent once  cefepime   IVPB 2000 milliGRAM(s) IV Intermittent every 12 hours  chlorhexidine 2% Cloths 1 Application(s) Topical <User Schedule>  cholecalciferol 1000 Unit(s) Oral daily  dextrose 40% Gel 15 Gram(s) Oral once  dextrose 50% Injectable 25 Gram(s) IV Push once  gabapentin Solution 100 milliGRAM(s) Oral three times a day  glucagon  Injectable 1 milliGRAM(s) IntraMuscular once  insulin lispro (ADMELOG) corrective regimen sliding scale   SubCutaneous every 6 hours  magnesium sulfate  IVPB 1 Gram(s) IV Intermittent once  methimazole 10 milliGRAM(s) Oral daily  metoclopramide 10 milliGRAM(s) Oral four times a day  mexiletine 150 milliGRAM(s) Oral every 8 hours  mirtazapine 7.5 milliGRAM(s) Oral at bedtime  multivitamin 1 Tablet(s) Oral daily  pantoprazole  Injectable 40 milliGRAM(s) IV Push daily  potassium phosphate IVPB 15 milliMole(s) IV Intermittent once  senna 2 Tablet(s) Oral at bedtime  sertraline 100 milliGRAM(s) Oral daily  vancomycin  IVPB 750 milliGRAM(s) IV Intermittent every 24 hours  vasopressin Infusion 0.02 Unit(s)/Min (1.2 mL/Hr) IV Continuous <Continuous>    MEDICATIONS  (PRN):    LABS: All Lab data reviewed and analyzed                        7.7    13.47 )-----------( 101      ( 04 Mar 2022 00:50 )             24.2    03-04    133<L>  |  100  |  14  ----------------------------<  125<H>  3.7   |  22  |  0.92    Ca    8.6      04 Mar 2022 00:50  Phos  1.9     03-04  Mg     1.8     03-04    TPro  7.2  /  Alb  2.9<L>  /  TBili  0.3  /  DBili  x   /  AST  22  /  ALT  16  /  AlkPhos  144<H>  03-04    Ca    8.9      02 Mar 2022 03:59  Phos  2.8     03-02  Mg     2.1     03-02    TPro  7.1  /  Alb  2.7<L>  /  TBili  0.4  /  DBili  x   /  AST  21  /  ALT  22  /  AlkPhos  120  03-02         PTT - ( 01 Mar 2022 00:08 )  PTT:25.0 sec LIVER FUNCTIONS - ( 01 Mar 2022 17:38 )  Alb: 2.9 g/dL / Pro: 7.3 g/dL / ALK PHOS: 116 U/L / ALT: 30 U/L / AST: 29 U/L / GGT: x           RADIOLOGY: - Reviewed and analyzed

## 2022-03-04 NOTE — PROGRESS NOTE ADULT - SUBJECTIVE AND OBJECTIVE BOX
Subjective:    Medications:  albumin human  5% IVPB 250 milliLiter(s) IV Intermittent once  cefepime   IVPB 1000 milliGRAM(s) IV Intermittent every 12 hours  chlorhexidine 2% Cloths 1 Application(s) Topical <User Schedule>  cholecalciferol 1000 Unit(s) Oral daily  insulin lispro (ADMELOG) corrective regimen sliding scale   SubCutaneous every 6 hours  lidocaine   Infusion 1 mG/Min IV Continuous <Continuous>  methimazole 10 milliGRAM(s) Oral daily  metoclopramide 10 milliGRAM(s) Oral four times a day  mexiletine 150 milliGRAM(s) Oral every 8 hours  midodrine 10 milliGRAM(s) Oral every 8 hours  multivitamin 1 Tablet(s) Oral daily  pantoprazole  Injectable 40 milliGRAM(s) IV Push every 12 hours  senna 2 Tablet(s) Oral at bedtime  sertraline 100 milliGRAM(s) Oral daily  vasopressin Infusion 0.04 Unit(s)/Min IV Continuous <Continuous>      Physical Exam:    Vitals:  Vital Signs Last 24 Hours  T(C): 37.1 (22 @ 08:00), Max: 37.4 (22 @ 20:00)  HR: 99 (22 @ 08:15) (75 - 123)  BP: 99/75 (22 @ 08:03) (97/76 - 99/75)  RR: 35 (22 @ 08:15) (20 - 35)  SpO2: 99% (22 @ 08:15) (83% - 100%)    Weight in k.2 ( @ 02:00)    I&O's Summary    03 Mar 2022 07:01  -  04 Mar 2022 07:00  --------------------------------------------------------  IN: 4327.5 mL / OUT: 1890 mL / NET: 2437.5 mL    Tele:    General: No distress. Comfortable.  HEENT: EOM intact.  Neck: Neck supple. JVP not elevated. No masses  Chest: Clear to auscultation bilaterally  CV: Normal S1 and S2. No murmurs, rub, or gallops. Radial pulses normal.  Abdomen: Soft, non-distended, non-tender  Skin: No rashes or skin breakdown  Neurology: Alert and oriented times three. Sensation intact  Psych: Affect normal    Labs:                        7.7    13.47 )-----------( 101      ( 04 Mar 2022 00:50 )             24.2     03-04    133<L>  |  100  |  14  ----------------------------<  125<H>  3.7   |  22  |  0.92    Ca    8.6      04 Mar 2022 00:50  Phos  1.9     03-04  Mg     1.8     -04    TPro  7.2  /  Alb  2.9<L>  /  TBili  0.3  /  DBili  x   /  AST  22  /  ALT  16  /  AlkPhos  144<H>  03-    PT/INR - ( 04 Mar 2022 02:56 )   PT: 13.3 sec;   INR: 1.14 ratio       PTT - ( 04 Mar 2022 02:56 )  PTT:28.6 sec    Serum Pro-Brain Natriuretic Peptide: 14365 pg/mL ( @ 02:56)  Serum Pro-Brain Natriuretic Peptide: 89385 pg/mL ( @ 02:53)  Serum Pro-Brain Natriuretic Peptide: 35602 pg/mL ( @ 03:59)  Serum Pro-Brain Natriuretic Peptide: 5486 pg/mL ( @ 23:13)      Lactate Dehydrogenase, Serum: 165 U/L ( @ 02:56)  Lactate Dehydrogenase, Serum: 179 U/L ( @ 02:53)  Lactate Dehydrogenase, Serum: 211 U/L ( @ 03:59)    Lactate, Blood: 1.0 mmol/L ( @ 17:38)     Subjective:  - made DNR/DNI following GOC conversation  - NSVT on tele  - Remains somnolent  - weaned off vaso this morning    Medications:  albumin human  5% IVPB 250 milliLiter(s) IV Intermittent once  cefepime   IVPB 1000 milliGRAM(s) IV Intermittent every 12 hours  chlorhexidine 2% Cloths 1 Application(s) Topical <User Schedule>  cholecalciferol 1000 Unit(s) Oral daily  insulin lispro (ADMELOG) corrective regimen sliding scale   SubCutaneous every 6 hours  lidocaine   Infusion 1 mG/Min IV Continuous <Continuous>  methimazole 10 milliGRAM(s) Oral daily  metoclopramide 10 milliGRAM(s) Oral four times a day  mexiletine 150 milliGRAM(s) Oral every 8 hours  midodrine 10 milliGRAM(s) Oral every 8 hours  multivitamin 1 Tablet(s) Oral daily  pantoprazole  Injectable 40 milliGRAM(s) IV Push every 12 hours  senna 2 Tablet(s) Oral at bedtime  sertraline 100 milliGRAM(s) Oral daily  vasopressin Infusion 0.04 Unit(s)/Min IV Continuous <Continuous>      Physical Exam:    Vitals:  Vital Signs Last 24 Hours  T(C): 37.1 (22 @ 08:00), Max: 37.4 (22 @ 20:00)  HR: 99 (22 @ 08:15) (75 - 123)  BP: 99/75 (22 @ 08:03) (97/76 - 99/75)  RR: 35 (22 @ 08:15) (20 - 35)  SpO2: 99% (22 @ 08:15) (83% - 100%)    LVAD heart mate 2  speed 9200  Flow 6.2  PI 5.3  Power 6.1  No PI events, no changes made  DLES dressing C/D/I    Weight in k.2 ( @ 02:00)    I&O's Summary    03 Mar 2022 07:01  -  04 Mar 2022 07:00  --------------------------------------------------------  IN: 4327.5 mL / OUT: 1890 mL / NET: 2437.5 mL    Tele: ST, NSVT    General: Frail. No acute distress.  HEENT: EOM intact. Open prior tracheostomy site with thin creamy secretions  Neck: Neck supple. JVP not elevated. No masses  Chest: Clear to auscultation bilaterally  CV: LVAD hum. No palpable pulses. trace R pedal edema.  Abdomen: Soft, non-distended, non-tender. LVAD driveline site with dressing c/d/i. perc choley drain in place with bilious output.  Skin: warm peripherally.  Neurology: Somnolent, not responding to painful stimuli or following commands  Psych: MARELY    Labs:                        7.7    13.47 )-----------( 101      ( 04 Mar 2022 00:50 )             24.2     03-04    133<L>  |  100  |  14  ----------------------------<  125<H>  3.7   |  22  |  0.92    Ca    8.6      04 Mar 2022 00:50  Phos  1.9     03-04  Mg     1.8     03-04    TPro  7.2  /  Alb  2.9<L>  /  TBili  0.3  /  DBili  x   /  AST  22  /  ALT  16  /  AlkPhos  144<H>  03-04    PT/INR - ( 04 Mar 2022 02:56 )   PT: 13.3 sec;   INR: 1.14 ratio       PTT - ( 04 Mar 2022 02:56 )  PTT:28.6 sec    Serum Pro-Brain Natriuretic Peptide: 28695 pg/mL ( @ 02:56)  Serum Pro-Brain Natriuretic Peptide: 84036 pg/mL ( @ 02:53)  Serum Pro-Brain Natriuretic Peptide: 30673 pg/mL ( @ 03:59)  Serum Pro-Brain Natriuretic Peptide: 5486 pg/mL ( @ 23:13)      Lactate Dehydrogenase, Serum: 165 U/L ( @ 02:56)  Lactate Dehydrogenase, Serum: 179 U/L ( @ 02:53)  Lactate Dehydrogenase, Serum: 211 U/L ( @ 03:59)    Lactate, Blood: 1.0 mmol/L ( @ 17:38)

## 2022-03-04 NOTE — PROGRESS NOTE ADULT - PROBLEM SELECTOR PLAN 3
- holding BB and spironolactone  - albumin for CVP < 10  - maintain I=O, would not allow CVP to climb above 10-12

## 2022-03-04 NOTE — PROGRESS NOTE ADULT - SUBJECTIVE AND OBJECTIVE BOX
RICKY HAMPTON  MRN-[id number]  Patient is a 66y old  Male who presents with a chief complaint of Septic shock (04 Mar 2022 19:41)    HPI:  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 s/p HM2 LVAD in 2017 at  (due to severe PAD) with TV ring, multiple GI bleeds, thus maintained off AC, CKD 3prior COVID-19 infection in 2020, recurrent syncope post LVAD s/p ILR, s/p prolonged complex hospitalization from -2021 initially admitted with abdominal pain complicated by GIB, respiratory failure s/p trach, multiple infections, s/p cholecystotomy tube and SMA stent 10/2021, ultimately discharged to [facility] rehab and then returned to [facility] for trach decannulation , readmitted in 2022 with recurrent COVID-19 infection, initially in distributive shock. Blood cultures were also positive for serratia marcescens which he has had in the past.     Pt was transferred to [facility] CICU from [facility]. He was sent to their ED from a rehab center due to AMS and tachycardia. At [facility], they noted a WBC greater than 20, tachycardia and hypercarbia on ABG. Pt was placed on BiPAP and transferred to [facility] CICU for concern for sepsis vs septic shock.      (2022 22:20)      Hospital Course:   Transferred to CCU for further management     24 HOUR EVENTS:    REVIEW OF SYSTEMS:    CONSTITUTIONAL: No weakness, fevers or chills  EYES/ENT: No visual changes;  No vertigo or throat pain   NECK: No pain or stiffness  RESPIRATORY: No cough, wheezing, hemoptysis; No shortness of breath  CARDIOVASCULAR: No chest pain or palpitations  GASTROINTESTINAL: No abdominal or epigastric pain. No nausea, vomiting, or hematemesis; No diarrhea or constipation. No melena or hematochezia.  GENITOURINARY: No dysuria, frequency or hematuria  NEUROLOGICAL: No numbness or weakness  SKIN: No itching, rashes      ICU Vital Signs Last 24 Hrs  T(C): 37.5 (04 Mar 2022 19:00), Max: 37.6 (04 Mar 2022 16:00)  T(F): 99.5 (04 Mar 2022 19:00), Max: 99.7 (04 Mar 2022 16:00)  HR: 93 (04 Mar 2022 19:30) (75 - 122)  BP: 99/75 (04 Mar 2022 08:03) (97/76 - 99/75)  BP(mean): 84 (04 Mar 2022 08:03) (82 - 84)  ABP: 83/67 (04 Mar 2022 19:30) (62/57 - 111/99)  ABP(mean): 75 (04 Mar 2022 19:30) (60 - 104)  RR: 32 (04 Mar 2022 19:30) (20 - 41)  SpO2: 96% (04 Mar 2022 19:30) (83% - 100%)    03 Mar 2022 07:01  -  04 Mar 2022 07:00  --------------------------------------------------------  IN: 4402.4 mL / OUT: 1955 mL / NET: 2447.4 mL    04 Mar 2022 07:01  -  04 Mar 2022 19:58  --------------------------------------------------------  IN: 1321 mL / OUT: 930 mL / NET: 391 mL        CAPILLARY BLOOD GLUCOSE    CAPILLARY BLOOD GLUCOSE      POCT Blood Glucose.: 139 mg/dL (04 Mar 2022 17:04)      PHYSICAL EXAM:  GENERAL: No acute distress, well-developed  HEAD:  Atraumatic, Normocephalic  EYES: EOMI, PERRLA, conjunctiva and sclera clear  NECK: Supple, no lymphadenopathy, no JVD  CHEST/LUNG: CTAB; No wheezes, rales, or rhonchi  HEART: Regular rate and rhythm. Normal S1/S2. No murmurs, rubs, or gallops  ABDOMEN: Soft, non-tender, non-distended; normal bowel sounds, no organomegaly  EXTREMITIES:  2+ peripheral pulses b/l, No clubbing, cyanosis, or edema  NEUROLOGY: A&O x 3, no focal deficits  SKIN: No rashes or lesions    ============================I/O===========================   I&O's Detail    03 Mar 2022 07:01  -  04 Mar 2022 07:00  --------------------------------------------------------  IN:    Albumin 5%  - 250 mL: 250 mL    Enteral Tube Flush: 1300 mL    IV PiggyBack: 410 mL    Jevity 1.2: 1230 mL    Lactated Ringers Bolus: 1000 mL    Lidocaine: 180 mL    Vasopressin: 13.2 mL    Vasopressin: 19.2 mL  Total IN: 4402.4 mL    OUT:    Drain (mL): 0 mL    Voided (mL): 1955 mL  Total OUT: 1955 mL    Total NET: 2447.4 mL      04 Mar 2022 07:01  -  04 Mar 2022 19:58  --------------------------------------------------------  IN:    Albumin 5%  - 250 mL: 250 mL    Enteral Tube Flush: 240 mL    IV PiggyBack: 150 mL    Jevity 1.2: 605 mL    Lidocaine: 15.1 mL    Lidocaine: 52.5 mL    Vasopressin: 8.4 mL  Total IN: 1321 mL    OUT:    Drain (mL): 45 mL    Voided (mL): 885 mL  Total OUT: 930 mL    Total NET: 391 mL        ============================ LABS =========================                        7.7    13.47 )-----------( 101      ( 04 Mar 2022 00:50 )             24.2     03-04    133<L>  |  100  |  14  ----------------------------<  125<H>  3.7   |  22  |  0.92    Ca    8.6      04 Mar 2022 00:50  Phos  1.9     -  Mg     1.8         TPro  7.2  /  Alb  2.9<L>  /  TBili  0.3  /  DBili  x   /  AST  22  /  ALT  16  /  AlkPhos  144<H>      LIVER FUNCTIONS - ( 04 Mar 2022 00:50 )  Alb: 2.9 g/dL / Pro: 7.2 g/dL / ALK PHOS: 144 U/L / ALT: 16 U/L / AST: 22 U/L / GGT: x           PT/INR - ( 04 Mar 2022 02:56 )   PT: 13.3 sec;   INR: 1.14 ratio         PTT - ( 04 Mar 2022 02:56 )  PTT:28.6 sec  ABG - ( 04 Mar 2022 02:32 )  pH, Arterial: 7.40  pH, Blood: x     /  pCO2: 39    /  pO2: 104   / HCO3: 24    / Base Excess: -0.5  /  SaO2: 98.4      Blood Gas Arterial, Lactate: 1.4 mmol/L (22 @ 02:32)    ======================Micro/Rad/Cardio=================  Telemtry: Reviewed   EKG: Reviewed  CXR: Reviewed  Culture: Reviewed   Echo: Transthoracic Echocardiogram:   Patient name: RICKY HAMPTON  YOB: 1955   Age: 66 (M)   MR#: [id number]  Study Date: 2022  [facility]  Study quality: Technically fair  Referring Physician: Jus Jensen MD  Blood Pressure: 96/81 mmHg  Height: 178 cm  Weight: 49 kg  BSA: 1.6 m2  ------------------------------------------------------------------------  PROCEDURE: Transthoracic echocardiogram with 2-D, M-Mode  and complete spectral and color flow Doppler.  INDICATION: Chronic ischemic heart disease, unspecified  (I25.9)  ------------------------------------------------------------------------  Dimensions:    Normal Values:  LA:     3.1    2.0 - 4.0 cm  Ao:     3.6    2.0 - 3.8 cm  SEPTUM: 1.1    0.6 - 1.2 cm  PWT: 1.1    0.6 - 1.1 cm  LVIDd:  4.1    3.0 - 5.6 cm  LVIDs:  3.9    1.8 - 4.0 cm  Derived variables:  LVMI: 95 g/m2  RWT: 0.53  Fractional short: 5 %  EF (Visual Estimate): 10 %  ------------------------------------------------------------------------  Conclusions:  1. Thickened mitral valve. Tethered mitral valve leaflets  with normal opening. Moderate mitral regurgitation. Peak  mitral valve gradient equals 6 mm Hg, mean transmitral  valve gradient equals 2 mm Hg, consistent with mild mitral  stenosis.  2. Calcified aortic valve which remains closed on all  observed beats.  Mild continuous aortic regurgitation.  3. Severe global left ventricular systolic dysfunction.  4. A Heartmate 2 LVAD at a sped of 9200RPM is present. The  aortic valve is closed.  There is mild continuious AI.  The  septum is midline. Pulsitile flow measuring 0.8m/s is  measured in to the LVAD inflow cannular  5. Normal right ventricular size with decreased right  ventricular systolic function.  6. An annuloplasty ring is seen in the tricuspid position.  Moderate tricuspid regurgitation.  Peak tricuspid valve  gradient equals 6 mm Hg, mean transmitral valve gradient  equals 2 mm Hg. Gradients measured at a HR of 88bpm.  7. Normal pulmonic valve. Mild-moderate pulmonic  regurgitation.  *** Compared with echocardiogram of 2022, the LVEDD is  smaller in the current study.  Laminar flow into the LVAD  is now seen.  ------------------------------------------------------------------------  Confirmed on  3/2/2022 - 11:39:36 by Justino Nichols M.D.  ------------------------------------------------------------------------ (22 @ 07:27)    Cath: Cardiac Cath Lab - Adult:   [facility]  Department of Cardiology  [address] [place] [address]  [phone]  Cath Lab Report -- Comprehensive Report  Patient: RICKY HAMPTON  Study date: 2017  Account number: [id number]  MR number: [id number]  : 1955  Gender: Male  Race:  Amer  Case Physician(s):  Tae Hernandez M.D.  Fellow:  Isma Oro M.D.  Referring Physician:  INDICATIONS: Acute systolic congestive heart failure.  HISTORY: History includes nonischemic cardiomyopathy. The patient has  hypertension.  PROCEDURE:  --  Right heart catheterization.  --  [place] Tariq Insertion.  --  Cardiac Fluoro.  TECHNIQUE: The risks and alternatives of the procedures and conscious  sedation were explained to the patient and informed consent was obtained.  Cardiac catheterization performed urgently.  Local anesthetic given. Right internal jugular vein access. Right heart  catheterization. The procedure was performed utilizing a catheter. [place]  Tariq Insertion. Cardiac Fluoro.RADIATION EXPOSURE: 1.9 min.  MEDICATIONS GIVEN: Midazolam, 0.5 mg, IV.  COMPLICATIONS: There were no complications. No complications occurred  during the cath lab visit.  DIAGNOSTIC RECOMMENDATIONS: The patient's diuretic regimen should be  carefully increased. Consider dobutamine  Prepared and signed by  Tae Hernandez M.D.  Signed 2017 17:04:37  HEMODYNAMIC TABLES  Pressures:  Baseline  Pressures:  - HR: 100  Pressures:  - Rhythm:  Pressures:  -- Pulmonary Artery (S/D/M): 60/37/40  Pressures:  -- Pulmonary Capillary Wedge: 37/43/33  Pressures:  -- Right Atrium (a/v/M): 28/28/23  Pressures:  -- Right Ventricle (s/edp): 60/25/--  O2 Sats:  Baseline  O2 Sats:  - HR: 100  O2 Sats:  - Rhythm:  O2 Sats:  -- AO: 14.7/94/18.79  O2 Sats:  -- Pa: 14.7/29.8/5.96  Outputs:  Baseline  Outputs:  -- CALCULATIONS: Age in years: 62.35  Outputs:  -- CALCULATIONS: Body Surface Area: 1.75  Outputs:  -- CALCULATIONS: Height in cm: 178.00  Outputs:  -- CALCULATIONS: Sex: Male  Outputs:  -- CALCULATIONS: Weight in k.00  Outputs:  -- OUTPUTS: Blood Oxygen Difference: 12.83  Outputs:  -- OUTPUTS: CO by Kemar: 1.82  Outputs:  -- OUTPUTS: Kemar cardiac index: 1.04  Outputs:  -- OUTPUTS: O2 consumption: 234.00  Outputs:  -- OUTPUTS: Vo2 Indexed: 133.53  Outputs:  -- RESISTANCES: Pulmonary vascular index (dsc): 538.13  Outputs:  -- RESISTANCES: Pulmonary vascular index (Wood Units): 6.73  Outputs:  -- RESISTANCES: Pulmonary vascular resistance (dsc): 307.08  Outputs:  -- RESISTANCES: Pulmonary vascular resistance (Wood Units): 3.84  Outputs:  -- RESISTANCES: Right ventricular stroke work: 3.98  Outputs:  -- RESISTANCES: Right ventricular stroke work index: 2.27  Outputs:  -- RESISTANCES: Total pulmonary index (dsc): 3075.05  Outputs:  -- RESISTANCES: Total pulmonary index (Wood Units): 38.45  Outputs:  -- RESISTANCES: Total pulmonary resistance (dsc): 1754.76  Outputs:  -- RESISTANCES: Total pulmonary resistance (Wood Units): 21.94  Outputs:  -- SHUNTS: Qs Indexed: 1.04  Outputs:  -- SHUNTS: Systemic flow: 1.82 (17 @ 15:43)    ======================================================  PAST MEDICAL & SURGICAL HISTORY:  CHF (congestive heart failure)    CAD (coronary artery disease)    Depression    Pleural effusion    History of 2019 novel coronavirus disease (COVID-19)  2020    Hemorrhoids    Bleeding hemorrhoids    Peripheral arterial disease    Claudication    BPH with urinary obstruction    ACC/AHA stage D systolic heart failure    Anticoagulation goal of INR 2.0 to 2.5    Falls    Clavicle fracture    CKD (chronic kidney disease), stage III    Iron deficiency anemia    H/O epistaxis    Vertigo    GI bleed    S/P TVR (tricuspid valve repair)    S/P ventricular assist device    S/P endoscopy    H/O tracheostomy      ====================ASSESSMENT ==============  65 M Pmh of ESHF w/ VAD, recurrent serratia bacteremia, pneumonia, intubated/trach x2 cycles, chronic gall bladder disease s/p percutaneous cholecystectomy, SMA ischemia s/p stent, cachexia who presented from [facility] for septic shock        Plan:  ====================== NEUROLOGY=====================  AMS  - per nursing home staff pt has been altered for one week  - pt AAOx0, not verbally responding to staff  - pt following commands  - continue to monitor neuro status  - CTH at OSH negative for acute pathology   -  CTH: Mild to mod white matter microvascular ischemic disease. No acute pathology   - unable to get Brain MRI  LVAD. consider repeat CTH in a few days if AMS unchanges  - vEEG neg for seizure activity   - no s/s of any subclinical seizures, continue to monitor   - F/u repeat CTH 3/3   - c/w sertraline     sertraline 100 milliGRAM(s) Oral daily    ==================== RESPIRATORY======================  Hypercpnic Respiratory failure   - Pt hypercarbic at OSH, placed on BiPAP, now weaned off  - appears comfortable now on 3L NC   - continue to monitor respiratory status   - wean NC as tolerated with goal SpO2 >94%    Hx trach w/ stoma  - Pt trach x2 cycles in past  - most recently in setting of COVID PNA in 2022  - Pt decannulated after admission  - purulent drainage in stoma site, cultured pending results  - ENT states no sign of infection at Stoma site- Plan for possible TE fistula closure when pt is stable as per ENT       ====================CARDIOVASCULAR==================  ACC/AHA stage D systolic heart failure s/p LVAD  - pt appeared hypovolemic on admission  - on and off pressor support w/ Vaso for MAP goal > 70   - started Midodrine 10 TID and IVF resuscitation PRN   - central sat sent from intro 81.8 with negative lactate x4    Ventricular Tachycardia  - pt has history of VT on previous admissions, takes home mexiletine  - significant NSVT and VT seen on tele in CICU overnight and this AM  - s/p lido bolus 100mg and 50mg and started on lido gtt restarted Mexitil 150 TID       lidocaine   Infusion 0.5 mG/Min (3.75 mL/Hr) IV Continuous <Continuous>  mexiletine 150 milliGRAM(s) Oral every 8 hours  midodrine 10 milliGRAM(s) Oral every 8 hours  vasopressin Infusion 0.04 Unit(s)/Min (2.4 mL/Hr) IV Continuous <Continuous>    ===================HEMATOLOGIC/ONC ===================  Anemia  - Hgb on admission 7.4 with subsequent drop to 6.6  - s/p 2u PRBC . Now h/h has been stable >48hrs. Will check cbc q12-24  - of note, pt has hx of GI bleeds  - no blood noted in BM, continue to monitor   - transfuse as needed if hgb <7    ===================== RENAL =========================  RASHAWN  - serum Cr downtrended   - continue to monitor SCr, BUN, lytes, and I&Os  - replete lytes prn with goal K 4-4.5 and mg >2    ==================== GASTROINTESTINAL===================  Hx GI bleed in past req multiple transfusions   - no overt bleeding noted on this admission   - monitor BM for signs of blood. h/h stable for past 24 hrs without any blood noted in BM  - c/w PPI  - Bowel regimen with Senna  - Reglan for gut motility     Diet  - Jevity 55cc/hr    cholecalciferol 1000 Unit(s) Oral daily  metoclopramide 10 milliGRAM(s) Oral four times a day  multivitamin 1 Tablet(s) Oral daily  pantoprazole  Injectable 40 milliGRAM(s) IV Push every 12 hours  senna 2 Tablet(s) Oral at bedtime    =======================    ENDOCRINE  =====================  Hx Hyperthyroidism   - continue home methimazole     Hyperglycemia   -maintain q6h ISS while on continuous enteral feeds     insulin lispro (ADMELOG) corrective regimen sliding scale   SubCutaneous every 6 hours  methimazole 10 milliGRAM(s) Oral daily      ========================INFECTIOUS DISEASE================  Septic shock  - pt arrived tachycardic 120s, hypotensive MAPs 50-60, AMS  - pt got zosyn and vancomycin x1 at OSH. switched from zosyn to cefepime. s/p vanco-, d/c yesterday as BCX with serratia.   - f/u infectious workup. BCX  gram neg rods x4 bottles-- growing serratia. pending sensitives   - f/u stoma culture sent   - CT C/A/P at OSH: cholecystostomy in place, patchy opacities vs. atelectasis as per written transfer report (CD and official report not sent with transfer)   - OSH BCX prelim gram neg rods in anaerobic bottle. continue to follow [facility] (BCX taken prior to abx initiation)  - ID following- recommending repeat CT C/A/P w/ con to look for source   - c/w Cefepime 1g q12h -     cefepime   IVPB 1000 milliGRAM(s) IV Intermittent every 12 hours      Patient requires continuous monitoring with bedside rhythm monitoring, pulse ox monitoring, and intermittent blood gas analysis. Care plan discussed with ICU care team. Patient remained critical and at risk for life threatening decompensation.  Patient seen, examined and plan discussed with CCU team during rounds.     I have personally provided ____ minutes of critical care time excluding time spent on separate procedures, in addition to initial critical care time provided by the CICU Attending, Dr. Jensen.    By signing my name below, I, Daniela Chowdary, attest that this documentation has been prepared under the direction and in the presence of CLAUDIA Jackson   Electronically signed: Cesia Suresh, 22 @ 19:58    I, CLAUDIA Jackson, personally performed the services described in this documentation. all medical record entries made by the scribe were at my direction and in my presence. I have reviewed the chart and agree that the record reflects my personal performance and is accurate and complete  Electronically signed: CLAUDIA Jackson        RICKY HAMPTON  MRN-[id number]  Patient is a 66y old  Male who presents with a chief complaint of Septic shock (04 Mar 2022 19:41)    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 s/p HM2 LVAD in 2017 at  (due to severe PAD) with TV ring, multiple GI bleeds, thus maintained off AC, CKD 3prior COVID-19 infection in 2020, recurrent syncope post LVAD s/p ILR, s/p prolonged complex hospitalization from -2021 initially admitted with abdominal pain complicated by GIB, respiratory failure s/p trach, multiple infections, s/p cholecystotomy tube and SMA stent 10/2021, ultimately discharged to [facility] rehab and then returned to [facility] for trach decannulation , readmitted in 2022 with recurrent COVID-19 infection, initially in distributive shock. Blood cultures were also positive for serratia marcescens which he has had in the past.     Pt was transferred to [facility] CICU from [facility]. He was sent to their ED from a rehab center due to AMS and tachycardia. At [facility], they noted a WBC greater than 20, tachycardia and hypercarbia on ABG. Pt was placed on BiPAP and transferred to [facility] CICU for concern for sepsis vs septic shock.      (2022 22:20)      Hospital Course:   Transferred to CCU for further management     24 HOUR EVENTS: no ectopy noted on tele on lido .5. no acute issues.     REVIEW OF SYSTEMS:  unable to assess due to pts mental status       ICU Vital Signs Last 24 Hrs  T(C): 37.5 (04 Mar 2022 19:00), Max: 37.6 (04 Mar 2022 16:00)  T(F): 99.5 (04 Mar 2022 19:00), Max: 99.7 (04 Mar 2022 16:00)  HR: 93 (04 Mar 2022 19:30) (75 - 122)  BP: 99/75 (04 Mar 2022 08:03) (97/76 - 99/75)  BP(mean): 84 (04 Mar 2022 08:03) (82 - 84)  ABP: 83/67 (04 Mar 2022 19:30) (62/57 - 111/99)  ABP(mean): 75 (04 Mar 2022 19:30) (60 - 104)  RR: 32 (04 Mar 2022 19:30) (20 - 41)  SpO2: 96% (04 Mar 2022 19:30) (83% - 100%)    03 Mar 2022 07:01  -  04 Mar 2022 07:00  --------------------------------------------------------  IN: 4402.4 mL / OUT: 1955 mL / NET: 2447.4 mL    04 Mar 2022 07:01  -  04 Mar 2022 19:58  --------------------------------------------------------  IN: 1321 mL / OUT: 930 mL / NET: 391 mL        CAPILLARY BLOOD GLUCOSE    CAPILLARY BLOOD GLUCOSE      POCT Blood Glucose.: 139 mg/dL (04 Mar 2022 17:04)      ============================I/O===========================   I&O's Detail    03 Mar 2022 07:01  -  04 Mar 2022 07:00  --------------------------------------------------------  IN:    Albumin 5%  - 250 mL: 250 mL    Enteral Tube Flush: 1300 mL    IV PiggyBack: 410 mL    Jevity 1.2: 1230 mL    Lactated Ringers Bolus: 1000 mL    Lidocaine: 180 mL    Vasopressin: 13.2 mL    Vasopressin: 19.2 mL  Total IN: 4402.4 mL    OUT:    Drain (mL): 0 mL    Voided (mL): 1955 mL  Total OUT: 1955 mL    Total NET: 2447.4 mL      04 Mar 2022 07:01  -  04 Mar 2022 19:58  --------------------------------------------------------  IN:    Albumin 5%  - 250 mL: 250 mL    Enteral Tube Flush: 240 mL    IV PiggyBack: 150 mL    Jevity 1.2: 605 mL    Lidocaine: 15.1 mL    Lidocaine: 52.5 mL    Vasopressin: 8.4 mL  Total IN: 1321 mL    OUT:    Drain (mL): 45 mL    Voided (mL): 885 mL  Total OUT: 930 mL    Total NET: 391 mL        ============================ LABS =========================                        7.7    13.47 )-----------( 101      ( 04 Mar 2022 00:50 )             24.2     03-04    133<L>  |  100  |  14  ----------------------------<  125<H>  3.7   |  22  |  0.92    Ca    8.6      04 Mar 2022 00:50  Phos  1.9     03-04  Mg     1.8     03-04    TPro  7.2  /  Alb  2.9<L>  /  TBili  0.3  /  DBili  x   /  AST  22  /  ALT  16  /  AlkPhos  144<H>  03-04    LIVER FUNCTIONS - ( 04 Mar 2022 00:50 )  Alb: 2.9 g/dL / Pro: 7.2 g/dL / ALK PHOS: 144 U/L / ALT: 16 U/L / AST: 22 U/L / GGT: x           PT/INR - ( 04 Mar 2022 02:56 )   PT: 13.3 sec;   INR: 1.14 ratio         PTT - ( 04 Mar 2022 02:56 )  PTT:28.6 sec  ABG - ( 04 Mar 2022 02:32 )  pH, Arterial: 7.40  pH, Blood: x     /  pCO2: 39    /  pO2: 104   / HCO3: 24    / Base Excess: -0.5  /  SaO2: 98.4      Blood Gas Arterial, Lactate: 1.4 mmol/L (22 @ 02:32)    ======================Micro/Rad/Cardio=================  Telemtry: Reviewed   EKG: Reviewed  CXR: Reviewed  Culture: Reviewed   Echo: Transthoracic Echocardiogram:   Patient name: RICKY HAMPTON  YOB: 1955   Age: 66 (M)   MR#: [id number]  Study Date: 2022  [facility]  Study quality: Technically fair  Referring Physician: Jus Jensen MD  Blood Pressure: 96/81 mmHg  Height: 178 cm  Weight: 49 kg  BSA: 1.6 m2  ------------------------------------------------------------------------  PROCEDURE: Transthoracic echocardiogram with 2-D, M-Mode  and complete spectral and color flow Doppler.  INDICATION: Chronic ischemic heart disease, unspecified  (I25.9)  ------------------------------------------------------------------------  Dimensions:    Normal Values:  LA:     3.1    2.0 - 4.0 cm  Ao:     3.6    2.0 - 3.8 cm  SEPTUM: 1.1    0.6 - 1.2 cm  PWT: 1.1    0.6 - 1.1 cm  LVIDd:  4.1    3.0 - 5.6 cm  LVIDs:  3.9    1.8 - 4.0 cm  Derived variables:  LVMI: 95 g/m2  RWT: 0.53  Fractional short: 5 %  EF (Visual Estimate): 10 %  ------------------------------------------------------------------------  Conclusions:  1. Thickened mitral valve. Tethered mitral valve leaflets  with normal opening. Moderate mitral regurgitation. Peak  mitral valve gradient equals 6 mm Hg, mean transmitral  valve gradient equals 2 mm Hg, consistent with mild mitral  stenosis.  2. Calcified aortic valve which remains closed on all  observed beats.  Mild continuous aortic regurgitation.  3. Severe global left ventricular systolic dysfunction.  4. A Heartmate 2 LVAD at a sped of 9200RPM is present. The  aortic valve is closed.  There is mild continuious AI.  The  septum is midline. Pulsitile flow measuring 0.8m/s is  measured in to the LVAD inflow cannular  5. Normal right ventricular size with decreased right  ventricular systolic function.  6. An annuloplasty ring is seen in the tricuspid position.  Moderate tricuspid regurgitation.  Peak tricuspid valve  gradient equals 6 mm Hg, mean transmitral valve gradient  equals 2 mm Hg. Gradients measured at a HR of 88bpm.  7. Normal pulmonic valve. Mild-moderate pulmonic  regurgitation.  *** Compared with echocardiogram of 2022, the LVEDD is  smaller in the current study.  Laminar flow into the LVAD  is now seen.  ------------------------------------------------------------------------  Confirmed on  3/2/2022 - 11:39:36 by Justino Nichols M.D.  ------------------------------------------------------------------------ (22 @ 07:27)    Cath: Cardiac Cath Lab - Adult:   [facility]  Department of Cardiology  [address] [place] [id number]  [phone]  Cath Lab Report -- Comprehensive Report  Patient: RICKY HAMPTON  Study date: 2017  Account number: [id number]  MR number: [id number]  : 1955  Gender: Male  Race:  Amer  Case Physician(s):  Tae Hernandez M.D.  Fellow:  Isma Oro M.D.  Referring Physician:  INDICATIONS: Acute systolic congestive heart failure.  HISTORY: History includes nonischemic cardiomyopathy. The patient has  hypertension.  PROCEDURE:  --  Right heart catheterization.  --  [place] Tariq Insertion.  --  Cardiac Fluoro.  TECHNIQUE: The risks and alternatives of the procedures and conscious  sedation were explained to the patient and informed consent was obtained.  Cardiac catheterization performed urgently.  Local anesthetic given. Right internal jugular vein access. Right heart  catheterization. The procedure was performed utilizing a catheter. [place]  Tariq Insertion. Cardiac Fluoro.RADIATION EXPOSURE: 1.9 min.  MEDICATIONS GIVEN: Midazolam, 0.5 mg, IV.  COMPLICATIONS: There were no complications. No complications occurred  during the cath lab visit.  DIAGNOSTIC RECOMMENDATIONS: The patient's diuretic regimen should be  carefully increased. Consider dobutamine  Prepared and signed by  Tae Hernandez M.D.  Signed 2017 17:04:37  HEMODYNAMIC TABLES  Pressures:  Baseline  Pressures:  - HR: 100  Pressures:  - Rhythm:  Pressures:  -- Pulmonary Artery (S/D/M): 60/37/40  Pressures:  -- Pulmonary Capillary Wedge: 37/43/33  Pressures:  -- Right Atrium (a/v/M): 28/28/23  Pressures:  -- Right Ventricle (s/edp): 60/25/--  O2 Sats:  Baseline  O2 Sats:  - HR: 100  O2 Sats:  - Rhythm:  O2 Sats:  -- AO: 14.7/94/18.79  O2 Sats:  -- Pa: 14.7/29.8/5.96  Outputs:  Baseline  Outputs:  -- CALCULATIONS: Age in years: 62.35  Outputs:  -- CALCULATIONS: Body Surface Area: 1.75  Outputs:  -- CALCULATIONS: Height in cm: 178.00  Outputs:  -- CALCULATIONS: Sex: Male  Outputs:  -- CALCULATIONS: Weight in k.00  Outputs:  -- OUTPUTS: Blood Oxygen Difference: 12.83  Outputs:  -- OUTPUTS: CO by Kemar: 1.82  Outputs:  -- OUTPUTS: Kemar cardiac index: 1.04  Outputs:  -- OUTPUTS: O2 consumption: 234.00  Outputs:  -- OUTPUTS: Vo2 Indexed: 133.53  Outputs:  -- RESISTANCES: Pulmonary vascular index (dsc): 538.13  Outputs:  -- RESISTANCES: Pulmonary vascular index (Wood Units): 6.73  Outputs:  -- RESISTANCES: Pulmonary vascular resistance (dsc): 307.08  Outputs:  -- RESISTANCES: Pulmonary vascular resistance (Wood Units): 3.84  Outputs:  -- RESISTANCES: Right ventricular stroke work: 3.98  Outputs:  -- RESISTANCES: Right ventricular stroke work index: 2.27  Outputs:  -- RESISTANCES: Total pulmonary index (dsc): 3075.05  Outputs:  -- RESISTANCES: Total pulmonary index (Wood Units): 38.45  Outputs:  -- RESISTANCES: Total pulmonary resistance (dsc): 1754.76  Outputs:  -- RESISTANCES: Total pulmonary resistance (Wood Units): 21.94  Outputs:  -- SHUNTS: Qs Indexed: 1.04  Outputs:  -- SHUNTS: Systemic flow: 1.82 (17 @ 15:43)    ======================================================  PAST MEDICAL & SURGICAL HISTORY:  CHF (congestive heart failure)    CAD (coronary artery disease)    Depression    Pleural effusion    History of 2019 novel coronavirus disease (COVID-19)  2020    Hemorrhoids    Bleeding hemorrhoids    Peripheral arterial disease    Claudication    BPH with urinary obstruction    ACC/AHA stage D systolic heart failure    Anticoagulation goal of INR 2.0 to 2.5    Falls    Clavicle fracture    CKD (chronic kidney disease), stage III    Iron deficiency anemia    H/O epistaxis    Vertigo    GI bleed    S/P TVR (tricuspid valve repair)    S/P ventricular assist device    S/P endoscopy    H/O tracheostomy      ====================ASSESSMENT ==============  65 M Pmh of ESHF w/ VAD, recurrent serratia bacteremia, pneumonia, intubated/trach x2 cycles, chronic gall bladder disease s/p percutaneous cholecystectomy, SMA ischemia s/p stent, cachexia who presented from [facility] for septic shock        Plan:  ====================== NEUROLOGY=====================  AMS  - per nursing home staff pt has been altered for one week  - pt AAOx0, not verbally responding to staff  - continue to monitor neuro status  - CTH at OSH negative for acute pathology   -  CTH: Mild to mod white matter microvascular ischemic disease. No acute pathology   - unable to get Brain MRI  LVAD. consider repeat CTH in a few days if AMS unchanges  - vEEG neg for seizure activity   - c/w sertraline       ==================== RESPIRATORY======================  Hypercpnic Respiratory failure   - Pt hypercarbic at OSH, placed on BiPAP, now weaned off  - appears comfortable now on room air   - continue to monitor respiratory status     Hx trach w/ stoma  - Pt trach x2 cycles in past  - most recently in setting of COVID PNA in 2022  - Pt decannulated after admission  - purulent drainage in stoma site, cultured pending results  - ENT states no sign of infection at Stoma site- Plan for possible TE fistula closure when pt is stable as per ENT       ====================CARDIOVASCULAR==================  ACC/AHA stage D systolic heart failure s/p LVAD  - pt appeared hypovolemic on admission  - on and off pressor support w/ Vaso for MAP goal > 70   - started Midodrine 10 TID and IVF resuscitation PRN, has been responsive to albumin   - central sat sent from intro 81.8 with negative lactate x4    Ventricular Tachycardia  - pt has history of VT on previous admissions, takes home mexiletine  - significant NSVT and VT seen on tele in CICU overnight and this AM  - s/p lido bolus 100mg and 50mg and started on lido gtt restarted Mexitil 150 TID   - maintain lido .5, consider weaning in AM   - monitor on tele, monitor lytes     ===================HEMATOLOGIC/ONC ===================  Anemia  - Hgb on admission 7.4 with subsequent drop to 6.6  - s/p 2u PRBC . Now h/h has been stable >48hrs. Will check cbc q12-24  - of note, pt has hx of GI bleeds  - no blood noted in BM, continue to monitor   - transfuse as needed if hgb <7    ===================== RENAL =========================  RASHAWN  - serum Cr downtrended   - continue to monitor SCr, BUN, lytes, and I&Os  - replete lytes prn with goal K 4-4.5 and mg >2    ==================== GASTROINTESTINAL===================  Hx GI bleed in past req multiple transfusions   - no overt bleeding noted on this admission   - monitor BM for signs of blood. h/h stable for past 24 hrs without any blood noted in BM  - c/w PPI  - Bowel regimen with Senna  - Reglan for gut motility     Diet  - Jevity 55cc/hr      =======================    ENDOCRINE  =====================  Hx Hyperthyroidism   - continue home methimazole     Hyperglycemia   -maintain q6h ISS while on continuous enteral feeds       ========================INFECTIOUS DISEASE================  Septic shock  - pt arrived tachycardic 120s, hypotensive MAPs 50-60, AMS  - pt got zosyn and vancomycin x1 at OSH. switched from zosyn to cefepime. s/p vanco-, d/c yesterday as BCX with serratia.   - f/u infectious workup. BCX  gram neg rods x4 bottles-- growing serratia  - f/u stoma culture sent   - CT C/A/P at OSH: cholecystostomy in place, patchy opacities vs. atelectasis as per written transfer report (CD and official report not sent with transfer)   - c/w Cefepime 1g q12h -     [facility]  - per family meeting, pt is DNR/DNI         Patient requires continuous monitoring with bedside rhythm monitoring, pulse ox monitoring, and intermittent blood gas analysis. Care plan discussed with ICU care team. Patient remained critical and at risk for life threatening decompensation.  Patient seen, examined and plan discussed with CCU team during rounds.     I have personally provided 35 minutes of critical care time excluding time spent on separate procedures, in addition to initial critical care time provided by the CICU Attending, Dr. Jensen.    By signing my name below, I, Daniela Chowdary, attest that this documentation has been prepared under the direction and in the presence of CLAUDIA Jackson   Electronically signed: Cesia Suresh, 22 @ 19:58    I, CLAUDIA Jackson, personally performed the services described in this documentation. all medical record entries made by the scribe were at my direction and in my presence. I have reviewed the chart and agree that the record reflects my personal performance and is accurate and complete  Electronically signed: CLAUDIA Jackson        RICKY HAMPTON  MRN-[id number]  Patient is a 66y old  Male who presents with a chief complaint of Septic shock (04 Mar 2022 19:41)    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 s/p HM2 LVAD in 2017 at  (due to severe PAD) with TV ring, multiple GI bleeds, thus maintained off AC, CKD 3prior COVID-19 infection in 2020, recurrent syncope post LVAD s/p ILR, s/p prolonged complex hospitalization from -2021 initially admitted with abdominal pain complicated by GIB, respiratory failure s/p trach, multiple infections, s/p cholecystotomy tube and SMA stent 10/2021, ultimately discharged to [facility] rehab and then returned to [facility] for trach decannulation , readmitted in 2022 with recurrent COVID-19 infection, initially in distributive shock. Blood cultures were also positive for serratia marcescens which he has had in the past.     Pt was transferred to [facility] CICU from [facility]. He was sent to their ED from a rehab center due to AMS and tachycardia. At [facility], they noted a WBC greater than 20, tachycardia and hypercarbia on ABG. Pt was placed on BiPAP and transferred to [facility] CICU for concern for sepsis vs septic shock.      (2022 22:20)      Hospital Course:   Transferred to CCU for further management     24 HOUR EVENTS: no ectopy noted on tele on lido .5. no acute issues.     REVIEW OF SYSTEMS:  unable to assess due to pts mental status       ICU Vital Signs Last 24 Hrs  T(C): 37.5 (04 Mar 2022 19:00), Max: 37.6 (04 Mar 2022 16:00)  T(F): 99.5 (04 Mar 2022 19:00), Max: 99.7 (04 Mar 2022 16:00)  HR: 93 (04 Mar 2022 19:30) (75 - 122)  BP: 99/75 (04 Mar 2022 08:03) (97/76 - 99/75)  BP(mean): 84 (04 Mar 2022 08:03) (82 - 84)  ABP: 83/67 (04 Mar 2022 19:30) (62/57 - 111/99)  ABP(mean): 75 (04 Mar 2022 19:30) (60 - 104)  RR: 32 (04 Mar 2022 19:30) (20 - 41)  SpO2: 96% (04 Mar 2022 19:30) (83% - 100%)    03 Mar 2022 07:01  -  04 Mar 2022 07:00  --------------------------------------------------------  IN: 4402.4 mL / OUT: 1955 mL / NET: 2447.4 mL    04 Mar 2022 07:01  -  04 Mar 2022 19:58  --------------------------------------------------------  IN: 1321 mL / OUT: 930 mL / NET: 391 mL        CAPILLARY BLOOD GLUCOSE    CAPILLARY BLOOD GLUCOSE      POCT Blood Glucose.: 139 mg/dL (04 Mar 2022 17:04)      ============================I/O===========================   I&O's Detail    03 Mar 2022 07:01  -  04 Mar 2022 07:00  --------------------------------------------------------  IN:    Albumin 5%  - 250 mL: 250 mL    Enteral Tube Flush: 1300 mL    IV PiggyBack: 410 mL    Jevity 1.2: 1230 mL    Lactated Ringers Bolus: 1000 mL    Lidocaine: 180 mL    Vasopressin: 13.2 mL    Vasopressin: 19.2 mL  Total IN: 4402.4 mL    OUT:    Drain (mL): 0 mL    Voided (mL): 1955 mL  Total OUT: 1955 mL    Total NET: 2447.4 mL      04 Mar 2022 07:01  -  04 Mar 2022 19:58  --------------------------------------------------------  IN:    Albumin 5%  - 250 mL: 250 mL    Enteral Tube Flush: 240 mL    IV PiggyBack: 150 mL    Jevity 1.2: 605 mL    Lidocaine: 15.1 mL    Lidocaine: 52.5 mL    Vasopressin: 8.4 mL  Total IN: 1321 mL    OUT:    Drain (mL): 45 mL    Voided (mL): 885 mL  Total OUT: 930 mL    Total NET: 391 mL        ============================ LABS =========================                        7.7    13.47 )-----------( 101      ( 04 Mar 2022 00:50 )             24.2     03-04    133<L>  |  100  |  14  ----------------------------<  125<H>  3.7   |  22  |  0.92    Ca    8.6      04 Mar 2022 00:50  Phos  1.9     03-04  Mg     1.8     03-04    TPro  7.2  /  Alb  2.9<L>  /  TBili  0.3  /  DBili  x   /  AST  22  /  ALT  16  /  AlkPhos  144<H>  03-04    LIVER FUNCTIONS - ( 04 Mar 2022 00:50 )  Alb: 2.9 g/dL / Pro: 7.2 g/dL / ALK PHOS: 144 U/L / ALT: 16 U/L / AST: 22 U/L / GGT: x           PT/INR - ( 04 Mar 2022 02:56 )   PT: 13.3 sec;   INR: 1.14 ratio         PTT - ( 04 Mar 2022 02:56 )  PTT:28.6 sec  ABG - ( 04 Mar 2022 02:32 )  pH, Arterial: 7.40  pH, Blood: x     /  pCO2: 39    /  pO2: 104   / HCO3: 24    / Base Excess: -0.5  /  SaO2: 98.4      Blood Gas Arterial, Lactate: 1.4 mmol/L (22 @ 02:32)    ======================Micro/Rad/Cardio=================  Telemtry: Reviewed   EKG: Reviewed  CXR: Reviewed  Culture: Reviewed   Echo: Transthoracic Echocardiogram:   Patient name: RICKY HAMPTON  YOB: 1955   Age: 66 (M)   MR#: [id number]  Study Date: 2022  [facility]  Study quality: Technically fair  Referring Physician: Jus Jensen MD  Blood Pressure: 96/81 mmHg  Height: 178 cm  Weight: 49 kg  BSA: 1.6 m2  ------------------------------------------------------------------------  PROCEDURE: Transthoracic echocardiogram with 2-D, M-Mode  and complete spectral and color flow Doppler.  INDICATION: Chronic ischemic heart disease, unspecified  (I25.9)  ------------------------------------------------------------------------  Dimensions:    Normal Values:  LA:     3.1    2.0 - 4.0 cm  Ao:     3.6    2.0 - 3.8 cm  SEPTUM: 1.1    0.6 - 1.2 cm  PWT: 1.1    0.6 - 1.1 cm  LVIDd:  4.1    3.0 - 5.6 cm  LVIDs:  3.9    1.8 - 4.0 cm  Derived variables:  LVMI: 95 g/m2  RWT: 0.53  Fractional short: 5 %  EF (Visual Estimate): 10 %  ------------------------------------------------------------------------  Conclusions:  1. Thickened mitral valve. Tethered mitral valve leaflets  with normal opening. Moderate mitral regurgitation. Peak  mitral valve gradient equals 6 mm Hg, mean transmitral  valve gradient equals 2 mm Hg, consistent with mild mitral  stenosis.  2. Calcified aortic valve which remains closed on all  observed beats.  Mild continuous aortic regurgitation.  3. Severe global left ventricular systolic dysfunction.  4. A Heartmate 2 LVAD at a sped of 9200RPM is present. The  aortic valve is closed.  There is mild continuious AI.  The  septum is midline. Pulsitile flow measuring 0.8m/s is  measured in to the LVAD inflow cannular  5. Normal right ventricular size with decreased right  ventricular systolic function.  6. An annuloplasty ring is seen in the tricuspid position.  Moderate tricuspid regurgitation.  Peak tricuspid valve  gradient equals 6 mm Hg, mean transmitral valve gradient  equals 2 mm Hg. Gradients measured at a HR of 88bpm.  7. Normal pulmonic valve. Mild-moderate pulmonic  regurgitation.  *** Compared with echocardiogram of 2022, the LVEDD is  smaller in the current study.  Laminar flow into the LVAD  is now seen.  ------------------------------------------------------------------------  Confirmed on  3/2/2022 - 11:39:36 by Justino Nichols M.D.  ------------------------------------------------------------------------ (22 @ 07:27)    Cath: Cardiac Cath Lab - Adult:   [facility]  Department of Cardiology  [address] [place] [address]  [phone]  Cath Lab Report -- Comprehensive Report  Patient: RICKY HAMPTON  Study date: 2017  Account number: [id number]  MR number: [id number]  : 1955  Gender: Male  Race:  Amer  Case Physician(s):  Tae Hernandez M.D.  Fellow:  Isma Oro M.D.  Referring Physician:  INDICATIONS: Acute systolic congestive heart failure.  HISTORY: History includes nonischemic cardiomyopathy. The patient has  hypertension.  PROCEDURE:  --  Right heart catheterization.  --  [place] Tariq Insertion.  --  Cardiac Fluoro.  TECHNIQUE: The risks and alternatives of the procedures and conscious  sedation were explained to the patient and informed consent was obtained.  Cardiac catheterization performed urgently.  Local anesthetic given. Right internal jugular vein access. Right heart  catheterization. The procedure was performed utilizing a catheter. [place]  Tariq Insertion. Cardiac Fluoro.RADIATION EXPOSURE: 1.9 min.  MEDICATIONS GIVEN: Midazolam, 0.5 mg, IV.  COMPLICATIONS: There were no complications. No complications occurred  during the cath lab visit.  DIAGNOSTIC RECOMMENDATIONS: The patient's diuretic regimen should be  carefully increased. Consider dobutamine  Prepared and signed by  Tae Hernandez M.D.  Signed 2017 17:04:37  HEMODYNAMIC TABLES  Pressures:  Baseline  Pressures:  - HR: 100  Pressures:  - Rhythm:  Pressures:  -- Pulmonary Artery (S/D/M): 60/37/40  Pressures:  -- Pulmonary Capillary Wedge: 37/43/33  Pressures:  -- Right Atrium (a/v/M): 28/28/23  Pressures:  -- Right Ventricle (s/edp): 60/25/--  O2 Sats:  Baseline  O2 Sats:  - HR: 100  O2 Sats:  - Rhythm:  O2 Sats:  -- AO: 14.7/94/18.79  O2 Sats:  -- Pa: 14.7/29.8/5.96  Outputs:  Baseline  Outputs:  -- CALCULATIONS: Age in years: 62.35  Outputs:  -- CALCULATIONS: Body Surface Area: 1.75  Outputs:  -- CALCULATIONS: Height in cm: 178.00  Outputs:  -- CALCULATIONS: Sex: Male  Outputs:  -- CALCULATIONS: Weight in k.00  Outputs:  -- OUTPUTS: Blood Oxygen Difference: 12.83  Outputs:  -- OUTPUTS: CO by Kemar: 1.82  Outputs:  -- OUTPUTS: Kemar cardiac index: 1.04  Outputs:  -- OUTPUTS: O2 consumption: 234.00  Outputs:  -- OUTPUTS: Vo2 Indexed: 133.53  Outputs:  -- RESISTANCES: Pulmonary vascular index (dsc): 538.13  Outputs:  -- RESISTANCES: Pulmonary vascular index (Wood Units): 6.73  Outputs:  -- RESISTANCES: Pulmonary vascular resistance (dsc): 307.08  Outputs:  -- RESISTANCES: Pulmonary vascular resistance (Wood Units): 3.84  Outputs:  -- RESISTANCES: Right ventricular stroke work: 3.98  Outputs:  -- RESISTANCES: Right ventricular stroke work index: 2.27  Outputs:  -- RESISTANCES: Total pulmonary index (dsc): 3075.05  Outputs:  -- RESISTANCES: Total pulmonary index (Wood Units): 38.45  Outputs:  -- RESISTANCES: Total pulmonary resistance (dsc): 1754.76  Outputs:  -- RESISTANCES: Total pulmonary resistance (Wood Units): 21.94  Outputs:  -- SHUNTS: Qs Indexed: 1.04  Outputs:  -- SHUNTS: Systemic flow: 1.82 (17 @ 15:43)    ======================================================  PAST MEDICAL & SURGICAL HISTORY:  CHF (congestive heart failure)    CAD (coronary artery disease)    Depression    Pleural effusion    History of 2019 novel coronavirus disease (COVID-19)  2020    Hemorrhoids    Bleeding hemorrhoids    Peripheral arterial disease    Claudication    BPH with urinary obstruction    ACC/AHA stage D systolic heart failure    Anticoagulation goal of INR 2.0 to 2.5    Falls    Clavicle fracture    CKD (chronic kidney disease), stage III    Iron deficiency anemia    H/O epistaxis    Vertigo    GI bleed    S/P TVR (tricuspid valve repair)    S/P ventricular assist device    S/P endoscopy    H/O tracheostomy      ====================ASSESSMENT ==============  65 M Pmh of ESHF w/ VAD, recurrent serratia bacteremia, pneumonia, intubated/trach x2 cycles, chronic gall bladder disease s/p percutaneous cholecystectomy, SMA ischemia s/p stent, cachexia who presented from [facility] for septic shock        Plan:  ====================== NEUROLOGY=====================  AMS  - per nursing home staff pt has been altered for one week  - pt AAOx0, not verbally responding to staff  - continue to monitor neuro status  - CTH at OSH negative for acute pathology   -  CTH: Mild to mod white matter microvascular ischemic disease. No acute pathology   - unable to get Brain MRI  LVAD. consider repeat CTH in a few days if AMS unchanges  - vEEG neg for seizure activity   - c/w sertraline       ==================== RESPIRATORY======================  Hypercpnic Respiratory failure   - Pt hypercarbic at OSH, placed on BiPAP, now weaned off  - appears comfortable now on room air   - continue to monitor respiratory status     Hx trach w/ stoma  - Pt trach x2 cycles in past  - most recently in setting of COVID PNA in 2022  - Pt decannulated after admission  - purulent drainage in stoma site, cultured pending results  - ENT states no sign of infection at Stoma site- Plan for possible TE fistula closure when pt is stable as per ENT       ====================CARDIOVASCULAR==================  ACC/AHA stage D systolic heart failure s/p LVAD  - pt appeared hypovolemic on admission  - on and off pressor support w/ Vaso for MAP goal > 70   - started Midodrine 10 TID and IVF resuscitation PRN, has been responsive to albumin   - central sat sent from intro 81.8 with negative lactate x4    Ventricular Tachycardia  - pt has history of VT on previous admissions, takes home mexiletine  - significant NSVT and VT seen on tele in CICU overnight and this AM  - s/p lido bolus 100mg and 50mg and started on lido gtt restarted Mexitil 150 TID   - maintain lido .5, consider weaning in AM   - monitor on tele, monitor lytes     ===================HEMATOLOGIC/ONC ===================  Anemia  - Hgb on admission 7.4 with subsequent drop to 6.6  - s/p 2u PRBC . Now h/h has been stable >48hrs. Will check cbc q12-24  - of note, pt has hx of GI bleeds  - no blood noted in BM, continue to monitor   - transfuse as needed if hgb <7    ===================== RENAL =========================  RASHAWN  - serum Cr downtrended   - continue to monitor SCr, BUN, lytes, and I&Os  - replete lytes prn with goal K 4-4.5 and mg >2    ==================== GASTROINTESTINAL===================  Hx GI bleed in past req multiple transfusions   - no overt bleeding noted on this admission   - monitor BM for signs of blood. h/h stable for past 24 hrs without any blood noted in BM  - c/w PPI  - Bowel regimen with Senna  - Reglan for gut motility     Diet  - Jevity 55cc/hr      =======================    ENDOCRINE  =====================  Hx Hyperthyroidism   - continue home methimazole     Hyperglycemia   -maintain q6h ISS while on continuous enteral feeds       ========================INFECTIOUS DISEASE================  Septic shock  - pt arrived tachycardic 120s, hypotensive MAPs 50-60, AMS  - pt got zosyn and vancomycin x1 at OSH. switched from zosyn to cefepime. s/p vanco-, d/c yesterday as BCX with serratia.   - f/u infectious workup. BCX  gram neg rods x4 bottles-- growing serratia  - f/u stoma culture sent   - CT C/A/P at OSH: cholecystostomy in place, patchy opacities vs. atelectasis as per written transfer report (CD and official report not sent with transfer)   - c/w Cefepime 1g q12h -     [facility]  - per family meeting, pt is DNR/DNI         Patient requires continuous monitoring with bedside rhythm monitoring, pulse ox monitoring, and intermittent blood gas analysis. Care plan discussed with ICU care team. Patient remained critical and at risk for life threatening decompensation.  Patient seen, examined and plan discussed with CCU team during rounds.     I have personally provided 35 minutes of critical care time excluding time spent on separate procedures, in addition to initial critical care time provided by the CICU Attending, Dr. Jensen.    By signing my name below, I, Daniela Chowdary, attest that this documentation has been prepared under the direction and in the presence of CLAUDIA Jackson   Electronically signed: Cesia Suresh, 22 @ 19:58    I, CLAUDIA Jackson, personally performed the services described in this documentation. all medical record entries made by the scribe were at my direction and in my presence. I have reviewed the chart and agree that the record reflects my personal performance and is accurate and complete  Electronically signed: CLAUDIA Jackson     _____________________________________________________________________    Fellow Addendum     66M NICM s/p LVAD (HM2), CKD3, chronic GIB who presented with shock (distributive). Bcx +Serratia. Hospital course c/b VT requiring lidocaine. Now weaning off lidocaine, continued on Mexiletine. Still having NSVT, will continue monitor for now.

## 2022-03-04 NOTE — PROGRESS NOTE ADULT - ATTENDING COMMENTS
Patient seen and examined with Dr Hall  I agree with his interval history exam and plans as noted above  Patient less responsive today      Serratia bacteremia on Cefepime with aortic thrombus seen ? infected thrombus  Repeat blood cultures pending for clearing of bacteremia    Morris Prater MD  [phone]  After 5pm/weekends [phone]

## 2022-03-04 NOTE — CONSULT NOTE ADULT - ASSESSMENT
Impression:    Sacral/bilateral buttocks deep tissue injuries present on admission  Right heel deep tissue injury present on admission  Left heel deep tissue injury present on admission  scrotal wound present on admission  Incontinence of bowel  Incontinence Dermatitis    Recommend:  1.) topical therapy: sacral/bilateral buttock/scrotal injuries - cleanse with incontinence cleanser, pat dry, apply Triad ointment BID and PRN for incontinent episodes  Bilateral heel injuries - cleanse with NS, pat dry, apply cavilon daily  2.) Incontinence Management - incontinence cleanser, pads, pericare BID  3.) Maintain on an alternating air with low air loss surface  4.) Turn and reposition Q 2 hours  5.) Nutrition optimization  6.) Offload heels/feet with complete cair air fluidized boots; ensure that the soles of the feet are not resting on the foot board of the bed.  7.) Wound care team Podiatrists, Luis Benitez/Kaleigh/Kelly will follow foot wounds.    Care as per medicine. Will not actively follow but will remain available. Please recall for new issues or deterioration.  Upon [facility] [address] [place] [address] [phone]  Seen and discussed with clinical nurse  Thank you for this consult  Trisha Hodge, PATIENCE-C, CWOCN [id number]

## 2022-03-04 NOTE — CONSULT NOTE ADULT - SUBJECTIVE AND OBJECTIVE BOX
Wound Surgery Consult Note:    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 s/p HM2 LVAD in 9/2017 at  (due to severe PAD) with TV ring, multiple GI bleeds, thus maintained off AC, CKD 3prior COVID-19 infection in 4/2020, recurrent syncope post LVAD s/p ILR, s/p prolonged complex hospitalization from 6/14-11/16/2021 initially admitted with abdominal pain complicated by GIB, respiratory failure s/p trach, multiple infections, s/p cholecystotomy tube and SMA stent 10/2021, ultimately discharged to [facility] rehab and then returned to [facility] for trach decannulation 12/2, readmitted in 2/2022 with recurrent COVID-19 infection, initially in distributive shock. Blood cultures were also positive for serratia marcescens which he has had in the past.     Pt was transferred to [facility] CICU from [facility]. He was sent to their ED from a rehab center due to AMS and tachycardia. At [facility], they noted a WBC greater than 20, tachycardia and hypercarbia on ABG. Pt was placed on BiPAP and transferred to [facility] CICU for concern for sepsis vs septic shock.  (27 Feb 2022 22:20)    Request for wound care consult for sacral/bilateral buttocks and heel skin breakdown received from nursing. Mr. Quispe was encountered on an alternating air with low air loss surface. She is grossly incontinent of mushy/liquid stool and urine. He has been seen and is being followed for his foot wounds by wound care team Podiatrists. His extreme immobility, inactivity, gross incontinence of stool as well as poor nutritional status all contribute to his high risk for pressure injury development and hinder healing. Identification of the initial signs of deep tissue damage at the level of the skin within 72 hours of admission make this an injury that occurred prior to admission.    PAST MEDICAL & SURGICAL HISTORY:  CHF (congestive heart failure)  CAD (coronary artery disease)  Depression  Pleural effusion  History of 2019 novel coronavirus disease (COVID-19), april 2020  Hemorrhoids  Bleeding hemorrhoids  Peripheral arterial disease  Claudication  BPH with urinary obstruction  ACC/AHA stage D systolic heart failure  Anticoagulation goal of INR 2.0 to 2.5  Falls  Clavicle fracture  CKD (chronic kidney disease), stage III  Iron deficiency anemia  H/O epistaxis  Vertigo  GI bleed  S/P TVR (tricuspid valve repair)  S/P ventricular assist device  S/P endoscopy  H/O tracheostomy    MEDICATIONS  (STANDING):  cefepime   IVPB 1000 milliGRAM(s) IV Intermittent every 12 hours  chlorhexidine 2% Cloths 1 Application(s) Topical <User Schedule>  cholecalciferol 1000 Unit(s) Oral daily  insulin lispro (ADMELOG) corrective regimen sliding scale   SubCutaneous every 6 hours  lidocaine   Infusion 1 mG/Min (7.5 mL/Hr) IV Continuous <Continuous>  methimazole 10 milliGRAM(s) Oral daily  metoclopramide 10 milliGRAM(s) Oral four times a day  mexiletine 150 milliGRAM(s) Oral every 8 hours  midodrine 10 milliGRAM(s) Oral every 8 hours  multivitamin 1 Tablet(s) Oral daily  pantoprazole  Injectable 40 milliGRAM(s) IV Push every 12 hours  senna 2 Tablet(s) Oral at bedtime  sertraline 100 milliGRAM(s) Oral daily  vasopressin Infusion 0.04 Unit(s)/Min (2.4 mL/Hr) IV Continuous <Continuous>    MEDICATIONS  (PRN):    Allergies    Amiodarone Hydrochloride (Other (Severe))    Intolerances    Vital Signs Last 24 Hrs  T(C): 37.2 (04 Mar 2022 11:30), Max: 37.4 (03 Mar 2022 20:00)  T(F): 99 (04 Mar 2022 11:30), Max: 99.3 (03 Mar 2022 20:00)  HR: 104 (04 Mar 2022 14:00) (75 - 123)  BP: 102/77 (04 Mar 2022 09:15) (97/76 - 102/77)  BP(mean): 86 (04 Mar 2022 09:15) (82 - 86)  RR: 34 (04 Mar 2022 14:00) (20 - 38)  SpO2: 98% (04 Mar 2022 14:00) (83% - 100%)    Physical Exam:  General: Awake, NAD   Respiratory: trach decannulated, supplemental O2  Cardiac: LVAD  Gastrointestinal: soft NT/ND  Neurology: nonverbal, not following commands  Musculoskeletal: no contractures  Vascular: BLE edema equal  Skin: Right buttocks with deep maroon discolored intact patches x 2 each 3cm x 3cm x 0cm, no drainage  Left buttocks with deep maroon discoloration and superfically denuded skin L 1cm x W 1cm x d 0.1 with surround maroon discoloration, scant drainage  Right heel deep maroon discolored intact skin   Left heel deep maroon discolored intact skin  No odor, increased warmth, tenderness, induration, fluctuance    LABS:  03-04    133<L>  |  100  |  14  ----------------------------<  125<H>  3.7   |  22  |  0.92    Ca    8.6      04 Mar 2022 00:50  Phos  1.9     03-04  Mg     1.8     03-04    TPro  7.2  /  Alb  2.9<L>  /  TBili  0.3  /  DBili  x   /  AST  22  /  ALT  16  /  AlkPhos  144<H>  03-04                          7.7    13.47 )-----------( 101      ( 04 Mar 2022 00:50 )             24.2     PT/INR - ( 04 Mar 2022 02:56 )   PT: 13.3 sec;   INR: 1.14 ratio         PTT - ( 04 Mar 2022 02:56 )  PTT:28.6 sec

## 2022-03-04 NOTE — PROGRESS NOTE ADULT - ATTENDING COMMENTS
goals of care conversation. family have agreed to DNR/DNI order, but wish all other medical interventions to proceed.   patient unresponsive. on low dose pressors.   meds; mitordrine 10 tid, vaso off, lido 1/hr, clemente 150 tid, cefipime 1g Q12. PPi, methimazole, 500 LR X2. albumin this am.   CVP 1, HR , NSVT, MAPs 82/-84/, afebrile.   LVAD: 9200, Flow 6.2, PI 5.3, Power 6.1,   I/O: +2.4  03-04  133<L>  |  100  |  14  ----------------------------<  125<H>  3.7   |  22  |  0.92  Ca    8.6      04 Mar 2022 00:50  Phos  1.9     03-04  Mg     1.8     03-04  TPro  7.2  /  Alb  2.9<L>  /  TBili  0.3  /  DBili  x   /  AST  22  /  ALT  16  /  AlkPhos  144<H>  03-04                        7.7    13.47 )-----------( 101      ( 04 Mar 2022 00:50 )             24.2   CT head: no acute changes.   CT chest/abdo/pelviss IV PO contrast: small bilat effusion, small ascites, splenic lesion descreased in size. aortic thrombus at infrarenal segment increased in size from prior with 50% narrowing of the vessel.   critically ill. for ongong supportive care.   note enlarging aortic thrombus, however patient has a history of recurrent GI bleeds and therefore risk/benefit does not favor AC.  continue nutrition. PRN albumin boluses.   culture trach site.   Zi Werner

## 2022-03-04 NOTE — PROGRESS NOTE ADULT - ASSESSMENT
66 years old male with PMHx of Stage D HF 2/2 NICM s/p HM2 LVAD in 9/2017 at  (due to severe PAD) with TV ring, multiple GI bleeds, no AC, CKD 3 prior COVID-19 infection in 4/2020, chronic cholecystitis s/p percutaneous cholecystostomy tube, recurrent COVID infection, Serratia bacteremia transferred from [facility] for sepsis.    ID is consulted for septic shock  Recently had Serratia bacteremia discharged on suppressive PO Levaquin  Returned with leukocytosis, fever, AMS, hypotension requiring pressors  ?purulent sputum coming out from previous trach site  CXR no PNA  CTH unrevealing  LVAD exit sites shows no signs of infection  Perc dawn tube site shows no signs of infection  BCx Serratia, S cefepime and ertapenem, R quinolones  CT showed abdominal aortic thrombus and a 2.6cm splenic lesion    Antibiotics:  Vancomycin 2/28 - 3/1  Zosyn 2/28  Cefepime 2/28 -     Currently unclear etiology of septic shock likely secondary to recurrent serratia bacteremia  Source of serratia is could be LVAD, infected aortic thrombus or splenic lesion  Regardless will treat this as in intravascular infection so likely 4 - 6 weeks in total    Detailed discussion with CICU attending Dr. Jensen, patient likely would have worsening respiratory status but due to his multiple comorbidities and previous intubation, it is futile to escalate care to intubation as patient will likely unable to be weaned off ventilator.      IMPRESSION:  Septic shock 2/2 serratia bacteremia  Aortic thrombus  Splenic lesion  Leukocytosis  Fever  LVAD      RECOMMENDATIONS:  - Continue IV cefepime 1gm q12hrs; can change to IV ertapenem 1gm q24hrs if patient continues to have poor mental status  - Follow up repeat blood cultures  - Obtain blood culture record from [facility] as well  - Aspiration precaution  - Trend WBC, fever curve, transaminases, creatinine daily  - Will continue to follow  - GOC discussion  - Guarded prognosis      Patient is seen and examined with attending and case is discussed with primary team      Marisa Hall DO  [id number] ID fellow  Please contact me via page or text through Microsoft Teams  If after 5PM or on weekends, please call [phone]

## 2022-03-04 NOTE — PROGRESS NOTE ADULT - ASSESSMENT
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 s/p HM2 LVAD in 9/2017 at  (due to severe PAD) with TV ring, multiple GI bleeds, thus maintained off AC, CKD 3prior COVID-19 infection in 4/2020, recurrent syncope post LVAD s/p ILR, s/p prolonged complex hospitalization from 6/14-11/16/2021 initially admitted with abdominal pain complicated by GIB, respiratory failure s/p trach, multiple infections, s/p cholecystotomy tube and SMA stent 10/2021, ultimately discharged to [facility] rehab and then returned to [facility] for trach decannulation 12/2. The patient had a recent admission with COVID 19 reinfection and distributive shock. That admission he had blood culture positive for serratia marcescens (discharged on levaquin).  He was treated with MAB, remdesivir, and steroids. He had recurrent VT and was started mexiletine. Now coming in from [facility] ED with leukocytosis and AMS was treated for UTI. Initially required bipap but was weaned off here. Labs concerning here for WC 26, hemoglobin of 7s then 6.7, BNP 5K, creatinine of 1.3. His maps were initially in the 50s. Physical exam showing pus from stoma, sat of central access of 81.8, and tachycardia are all concerning for septic shock picture. He is s/p 1L fluids, 1uPRBCs, was placed on vaso and lidocaine     Upon admission HM2 4.8 LPM, 9200 RPM     He was found to be bacteremic with recurrent serratia, on IV abx possibly r/t LVAD infection vs splenic abscess noted on CT 1/19. Afebrile since 3/1. Leukocytosis improving. Low CVP this morning despite albumin and IVF with transient improvement. Vasopressin weaned off this morning, started on midodrine. NSVT this morning, on lidocaine and mexiletine. LVAD without s/s of pump malfunction. Overall, critically ill with guarded prognosis.

## 2022-03-04 NOTE — PROGRESS NOTE ADULT - SUBJECTIVE AND OBJECTIVE BOX
INFECTIOUS DISEASE FOLLOW UP NOTE:    Interval History/ROS: Patient is a 66y old  Male who presents with a chief complaint of Septic shock (04 Mar 2022 19:58)    Overnight events: Patient remains afebrile overnight. Had to be on and off pressors. Had one BM last night.       REVIEW OF SYSTEMS:  Unable to collect due to cognitive impairement      Prior hospital charts reviewed [Yes]  Primary team notes reviewed [Yes]  Other consultant notes reviewed [Yes]    Allergies:  Amiodarone Hydrochloride (Other (Severe))      ANTIMICROBIALS:   cefepime   IVPB 1000 every 12 hours      OTHER MEDS: MEDICATIONS  (STANDING):  insulin lispro (ADMELOG) corrective regimen sliding scale  every 6 hours  lidocaine   Infusion 0.5 <Continuous>  methimazole 10 daily  metoclopramide 10 four times a day  mexiletine 150 every 8 hours  midodrine 10 every 8 hours  pantoprazole  Injectable 40 every 12 hours  senna 2 at bedtime  sertraline 100 daily  vasopressin Infusion 0.04 <Continuous>      Vital Signs Last 24 Hrs  T(F): 99.5 (03-04-22 @ 19:00), Max: 101.1 (03-01-22 @ 09:00)    Vital Signs Last 24 Hrs  HR: 97 (03-04-22 @ 21:30) (75 - 122)  BP: 99/75 (03-04-22 @ 08:03) (97/76 - 99/75)  RR: 25 (03-04-22 @ 21:30)  SpO2: 97% (03-04-22 @ 21:30) (83% - 100%)  Wt(kg): --    EXAM:  GENERAL: lying in bed, a little tachypneic   HEAD: Atraumatic  EYES: Conjunctiva pink and cornea white  ENT: Normal external ears and nose, no discharges; dry mucous membranes  NECK: Supple, nontender to palpation; stoma at previous trach site; dry secretion; RIJ central line  CHEST/LUNG: + bilateral air entry, no rhonchi  HEART: S1 S2  ABDOMEN: Soft, nontender, nondistended; normoactive bowel sounds; Perc dawn site no erythema or drainage, Drive line site is without erythema  EXTREMITIES: No clubbing, cyanosis, or petal edema  NERVOUS SYSTEM: awake but alert, open eyes when called but not oriented. unable to follow commands. nonverbal  MSK: No joint erythema, swelling  SKIN: No rashes or lesions, no superficial thrombophlebitis  LINES: RIJ central line; Right axillary A line    Labs:                        7.7    13.47 )-----------( 101      ( 04 Mar 2022 00:50 )             24.2     03-04    133<L>  |  100  |  14  ----------------------------<  125<H>  3.7   |  22  |  0.92    Ca    8.6      04 Mar 2022 00:50  Phos  1.9     03-04  Mg     1.8     03-04    TPro  7.2  /  Alb  2.9<L>  /  TBili  0.3  /  DBili  x   /  AST  22  /  ALT  16  /  AlkPhos  144<H>  03-04      WBC Trend:  WBC Count: 13.47 (03-04-22 @ 00:50)  WBC Count: 16.60 (03-03-22 @ 02:53)  WBC Count: 22.54 (03-02-22 @ 04:00)  WBC Count: 21.24 (03-01-22 @ 17:38)      Creatine Trend:  Creatinine, Serum: 0.92 (03-04)  Creatinine, Serum: 0.99 (03-03)  Creatinine, Serum: 0.94 (03-02)  Creatinine, Serum: 0.97 (03-01)      Liver Biochemical Testing Trend:  Alanine Aminotransferase (ALT/SGPT): 16 (03-04)  Alanine Aminotransferase (ALT/SGPT): 17 (03-03)  Alanine Aminotransferase (ALT/SGPT): 22 (03-02)  Alanine Aminotransferase (ALT/SGPT): 30 (03-01)  Alanine Aminotransferase (ALT/SGPT): 40 (03-01)  Aspartate Aminotransferase (AST/SGOT): 22 (03-04-22 @ 00:50)  Aspartate Aminotransferase (AST/SGOT): 17 (03-03-22 @ 02:53)  Aspartate Aminotransferase (AST/SGOT): 21 (03-02-22 @ 03:59)  Aspartate Aminotransferase (AST/SGOT): 29 (03-01-22 @ 17:38)  Aspartate Aminotransferase (AST/SGOT): 58 (03-01-22 @ 00:08)  Bilirubin Total, Serum: 0.3 (03-04)  Bilirubin Total, Serum: 0.3 (03-03)  Bilirubin Total, Serum: 0.4 (03-02)  Bilirubin Total, Serum: 0.4 (03-01)  Bilirubin Total, Serum: 0.5 (03-01)      Trend LDH  03-04-22 @ 02:56  165  03-03-22 @ 02:53  179  03-02-22 @ 03:59  211  03-01-22 @ 00:08  199  02-27-22 @ 23:13  381<H>          MICROBIOLOGY:  Vancomycin Level, Random: 8.5 (02-28 @ 04:53)    MRSA PCR Result.: NotDetec (02-28-22 @ 04:25)  MRSA PCR Result.: NotDetec (12-13-21 @ 07:59)  MRSA PCR Result.: NotDetec (10-17-21 @ 21:55)  MRSA PCR Result.: NotDetec (06-14-21 @ 23:02)      Culture - Blood (collected 02 Mar 2022 21:52)  Source: .Blood Blood  Preliminary Report:    No growth to date.    Culture - Blood (collected 02 Mar 2022 21:52)  Source: .Blood Blood  Preliminary Report:    No growth to date.    Culture - Blood (collected 28 Feb 2022 04:26)  Source: .Blood Blood-Peripheral  Final Report:    Growth in aerobic bottle: Serratia marcescens    See previous culture [id number]    Culture - Blood (collected 28 Feb 2022 01:33)  Source: .Blood Blood-Peripheral  Final Report:    Growth in aerobic and anaerobic bottles: Serratia marcescens    ***Blood Panel PCR results on this specimen are available    approximately 3 hours after the Gram stain result.***    Gram stain, PCR, and/or culture results may not always    correspond due to difference in methodologies.    ************************************************************    This PCR assay was performed by multiplex PCR. This    Assay tests for 66 bacterial and resistance gene targets.    Please refer to the [facility] test directory    at [email or web address] for details.  Organism: Blood Culture PCR  Serratia marcescens  Organism: Serratia marcescens    Sensitivities:      -  Amikacin: S <=16      -  Ampicillin: R >16 These ampicillin results predict results for amoxicillin      -  Ampicillin/Sulbactam: R >16/8 Enterobacter, Klebsiella aerogenes, Citrobacter, and Serratia may develop resistance during prolonged therapy (3-4 days)      -  Aztreonam: S <=4      -  Cefazolin: R >16 Enterobacter, Klebsiella aerogenes, Citrobacter, and Serratia may develop resistance during prolonged therapy (3-4 days)      -  Cefepime: S <=2      -  Cefoxitin: R >16      -  Ceftriaxone: R 8 Enterobacter, Klebsiella aerogenes, Citrobacter, and Serratia may develop resistance during prolonged therapy      -  Ciprofloxacin: R >2      -  Ertapenem: S <=0.5      -  Gentamicin: S <=2      -  Levofloxacin: R 4      -  Meropenem: S <=1      -  Piperacillin/Tazobactam: S <=8      -  Tobramycin: S <=2      -  Trimethoprim/Sulfamethoxazole: S 2/38      Method Type: CLAU  Organism: Blood Culture PCR    Sensitivities:      -  Serratia marcescens: Detec      Method Type: PCR    Culture - Blood (collected 14 Jan 2022 10:22)  Source: .Blood Blood  Final Report:    No Growth Final    Culture - Blood (collected 14 Jan 2022 10:22)  Source: .Blood Blood  Final Report:    No Growth Final    Culture - Blood (collected 13 Jan 2022 22:53)  Source: .Blood Blood  Final Report:    No Growth Final    Culture - Blood (collected 11 Jan 2022 23:02)  Source: .Blood Blood-Venous  Final Report:    Growth in aerobic and anaerobic bottles: Serratia marcescens    ***Blood Panel PCR results on this specimen are available    approximately 3 hours after the Gram stain result.***    Gram stain, PCR, and/or culture results may not always    correspond due to difference in methodologies.    ************************************************************    This PCR assay was performed by multiplex PCR. This    Assay tests for 66 bacterial and resistance gene targets.    Please refer to the [facility] test directory    at [email or web address] for details.  Organism: Blood Culture PCR  Serratia marcescens  Organism: Serratia marcescens    Sensitivities:      -  Amikacin: S <=16      -  Ampicillin: R >16 These ampicillin results predict results for amoxicillin      -  Ampicillin/Sulbactam: R >16/8 Enterobacter, Klebsiella aerogenes, Citrobacter, and Serratia may develop resistance during prolonged therapy (3-4 days)      -  Aztreonam: S <=4      -  Cefazolin: R >16 Enterobacter, Klebsiella aerogenes, Citrobacter, and Serratia may develop resistance during prolonged therapy (3-4 days)      -  Cefepime: S <=2      -  Cefoxitin: R >16      -  Ceftriaxone: S <=1 Enterobacter, Klebsiella aerogenes, Citrobacter, and Serratia may develop resistance during prolonged therapy      -  Ciprofloxacin: R >2      -  Ertapenem: S <=0.5      -  Gentamicin: S <=2      -  Levofloxacin: R 2      -  Meropenem: S <=1      -  Piperacillin/Tazobactam: S <=8      -  Tobramycin: S <=2      -  Trimethoprim/Sulfamethoxazole: S 2/38      Method Type: CLAU  Organism: Blood Culture PCR    Sensitivities:      -  Serratia marcescens: Detec      Method Type: PCR    Culture - Blood (collected 30 Dec 2021 22:07)  Source: .Blood Blood  Final Report:    No Growth Final    Culture - Blood (collected 18 Dec 2021 08:15)  Source: .Blood Blood  Final Report:    No Growth Final      HIV-1/2 Combo Result: Nonreact (01-14-21 @ 08:18)    ABS CD4: 129 /uL (04-07-20 @ 08:38)                  Legionella Antigen, Urine: Negative (03-01 @ 04:13)    COVID-19 PCR: NotDetec (02-01-22 @ 13:13)  COVID-19 PCR: NotDetec (01-30-22 @ 06:10)    COVID-19 [place] Domain AB Interp: Positive (12-13-21 @ 04:49)  COVID-19 Nucleocapsid PAT AB Interp: Positive (12-13-21 @ 04:49)  COVID-19 [place] Domain AB Interp: Positive (06-15-21 @ 10:15)    Procalcitonin, Serum: 6.34 (03-01)  Procalcitonin, Serum: 10.93 (02-27)      Ferritin, Serum: 1828 (02-28)      Lactate Dehydrogenase, Serum: 165 (03-04)  Lactate Dehydrogenase, Serum: 179 (03-03)  Lactate Dehydrogenase, Serum: 211 (03-02)  Lactate Dehydrogenase, Serum: 199 (03-01)  Lactate Dehydrogenase, Serum: 381 (02-27)    Serum Pro-Brain Natriuretic Peptide: 44892 (03-04)  Serum Pro-Brain Natriuretic Peptide: 89574 (03-03)  Serum Pro-Brain Natriuretic Peptide: 16598 (03-02)  Serum Pro-Brain Natriuretic Peptide: 5486 (02-27)    Troponin T, High Sensitivity Result: 24 (02-27)    Blood Gas Arterial, Lactate: 1.4 (03-04 @ 02:32)  Blood Gas Arterial, Lactate: 1.1 (03-03 @ 02:50)  Blood Gas Arterial, Lactate: 0.6 (03-02 @ 03:55)  Blood Gas Arterial, Lactate: 1.2 (03-01 @ 23:11)      RADIOLOGY:  imaging below personally reviewed    < from: CT Chest w/ IV Cont (03.03.22 @ 20:45) >  IMPRESSION:  Small bilateral pleural effusions and small volume ascites, new from   prior exam. No drainable fluid collection.    A 2.6 m hypoattenuating splenic lesion is decreased.    Aortic thrombus narrowing the lumen at least 50% at the infrarenal   segment, increased from the prior exam.    < end of copied text >

## 2022-03-04 NOTE — PROGRESS NOTE ADULT - PROBLEM SELECTOR PLAN 1
2/28 BCx + serratia/GNR  - IV abx per ID. currently on cefepime.  - CT C/A/P showing small bilateral pleural effusions, small volume ascites, which is new, decreased splenic lesion, aortic thrombus narrowing the lumen at least 505 at the infrarenal segment, increased from prior  - currently off vaso on midodrine, titrate vasopressin as needed for MAP of 70  - home mirtazapine and gabapentin discontinued given lethargy  - appreciate palliative care input regarding GOC

## 2022-03-04 NOTE — PROGRESS NOTE ADULT - SUBJECTIVE AND OBJECTIVE BOX
PATIENT:  RICKY JOINT  [id number]    CHIEF COMPLAINT:  Patient is a 66y old  Male who presents with a chief complaint of Septic shock (03 Mar 2022 19:49)      INTERVAL HISTORYOVERNIGHT EVENTS:      REVIEW OF SYSTEMS:    Constitutional:     [ ] negative [ ] fevers [ ] chills [ ] weight loss [ ] weight gain  HEENT:                  [ ] negative [ ] dry eyes [ ] eye irritation [ ] postnasal drip [ ] nasal congestion  CV:                         [ ] negative  [ ] chest pain [ ] orthopnea [ ] palpitations [ ] murmur  Resp:                     [ ] negative [ ] cough [ ] shortness of breath [ ] dyspnea [ ] wheezing [ ] sputum [ ] hemoptysis  GI:                          [ ] negative [ ] nausea [ ] vomiting [ ] diarrhea [ ] constipation [ ] abd pain [ ] dysphagia   :                        [ ] negative [ ] dysuria [ ] nocturia [ ] hematuria [ ] increased urinary frequency  Musculoskeletal: [ ] negative [ ] back pain [ ] myalgias [ ] arthralgias [ ] fracture  Skin:                       [ ] negative [ ] rash [ ] itch  Neurological:        [ ] negative [ ] headache [ ] dizziness [ ] syncope [ ] weakness [ ] numbness  Psychiatric:           [ ] negative [ ] anxiety [ ] depression  Endocrine:            [ ] negative [ ] diabetes [ ] thyroid problem  Heme/Lymph:      [ ] negative [ ] anemia [ ] bleeding problem  Allergic/Immune: [ ] negative [ ] itchy eyes [ ] nasal discharge [ ] hives [ ] angioedema    [ ] All other systems negative  [x ] Unable to assess ROS because AMS________.    MEDICATIONS:  MEDICATIONS  (STANDING):  albumin human  5% IVPB 250 milliLiter(s) IV Intermittent once  cefepime   IVPB 1000 milliGRAM(s) IV Intermittent every 12 hours  chlorhexidine 2% Cloths 1 Application(s) Topical <User Schedule>  cholecalciferol 1000 Unit(s) Oral daily  insulin lispro (ADMELOG) corrective regimen sliding scale   SubCutaneous every 6 hours  lidocaine   Infusion 1 mG/Min (7.5 mL/Hr) IV Continuous <Continuous>  methimazole 10 milliGRAM(s) Oral daily  metoclopramide 10 milliGRAM(s) Oral four times a day  mexiletine 150 milliGRAM(s) Oral every 8 hours  midodrine 10 milliGRAM(s) Oral every 8 hours  multivitamin 1 Tablet(s) Oral daily  pantoprazole  Injectable 40 milliGRAM(s) IV Push every 12 hours  senna 2 Tablet(s) Oral at bedtime  sertraline 100 milliGRAM(s) Oral daily  vasopressin Infusion 0.04 Unit(s)/Min (2.4 mL/Hr) IV Continuous <Continuous>    MEDICATIONS  (PRN):      ALLERGIES:  Allergies    Amiodarone Hydrochloride (Other (Severe))    Intolerances        OBJECTIVE:  ICU Vital Signs Last 24 Hrs  T(C): 36.6 (04 Mar 2022 04:00), Max: 37.4 (03 Mar 2022 20:00)  T(F): 97.9 (04 Mar 2022 04:00), Max: 99.3 (03 Mar 2022 20:00)  HR: 94 (04 Mar 2022 06:30) (75 - 123)  BP: --  BP(mean): --  ABP: 93/73 (04 Mar 2022 06:30) (69/59 - 111/89)  ABP(mean): 82 (04 Mar 2022 06:30) (64 - 99)  RR: 25 (04 Mar 2022 06:30) (20 - 35)  SpO2: 97% (04 Mar 2022 06:30) (83% - 100%)      Adult Advanced Hemodynamics Last 24 Hrs  CVP(mm Hg): 4 (04 Mar 2022 06:30) (0 - 17)  CVP(cm H2O): --  CO: --  CI: --  PA: --  PA(mean): --  PCWP: --  SVR: --  SVRI: --  PVR: --  PVRI: --  CAPILLARY BLOOD GLUCOSE      POCT Blood Glucose.: 181 mg/dL (04 Mar 2022 05:21)  POCT Blood Glucose.: 109 mg/dL (03 Mar 2022 23:07)  POCT Blood Glucose.: 201 mg/dL (03 Mar 2022 17:59)  POCT Blood Glucose.: 195 mg/dL (03 Mar 2022 12:53)    CAPILLARY BLOOD GLUCOSE      POCT Blood Glucose.: 181 mg/dL (04 Mar 2022 05:21)    I&O's Summary    03 Mar 2022 07:01  -  04 Mar 2022 07:00  --------------------------------------------------------  IN: 4327.5 mL / OUT: 1890 mL / NET: 2437.5 mL      Daily     Daily Weight in k.2 (04 Mar 2022 02:00)    PHYSICAL EXAMINATION:  General: WN/WD NAD  HEENT: PERRLA, EOMI, moist mucous membranes  Neurology: A&Ox3, nonfocal, BLANDON x 4  Respiratory: CTA B/L, normal respiratory effort, no wheezes, crackles, rales  CV: RRR, S1S2, no murmurs, rubs or gallops  Abdominal: Soft, NT, ND +BS, Last BM  Extremities: No edema, + peripheral pulses  Incisions:   Tubes:    LABS:  ABG - ( 04 Mar 2022 02:32 )  pH, Arterial: 7.40  pH, Blood: x     /  pCO2: 39    /  pO2: 104   / HCO3: 24    / Base Excess: -0.5  /  SaO2: 98.4                                    7.7    13.47 )-----------( 101      ( 04 Mar 2022 00:50 )             24.2     03-04    133<L>  |  100  |  14  ----------------------------<  125<H>  3.7   |  22  |  0.92    Ca    8.6      04 Mar 2022 00:50  Phos  1.9     03-04  Mg     1.8     03-04    TPro  7.2  /  Alb  2.9<L>  /  TBili  0.3  /  DBili  x   /  AST  22  /  ALT  16  /  AlkPhos  144<H>  03-04    LIVER FUNCTIONS - ( 04 Mar 2022 00:50 )  Alb: 2.9 g/dL / Pro: 7.2 g/dL / ALK PHOS: 144 U/L / ALT: 16 U/L / AST: 22 U/L / GGT: x           PT/INR - ( 04 Mar 2022 02:56 )   PT: 13.3 sec;   INR: 1.14 ratio         PTT - ( 04 Mar 2022 02:56 )  PTT:28.6 sec          65 M Pmh of ESHF w/ VAD, recurrent serratia bacteremia, pneumonia, intubated/trach x2 cycles, chronic gall bladder disease s/p percutaneous cholecystectomy, SMA ischemia s/p stent, cachexia who presented from [facility] for septic shock    Plan:  ====================== NEUROLOGY=====================  AMS  - per nursing home staff pt has been altered for one week  - pt AAOx0, not verbally responding to staff  - pt following commands  - continue to monitor neuro status  - CTH at OSH negative for acute pathology   -  CTH: Mild to mod white matter microvascular ischemic disease. No acute pathology   - unable to get Brain MRI  LVAD. consider repeat CTH in a few days if AMS unchanges  - vEEG neg for seixure activity   - no s/s of any subclinical seizures, continue to monitor   - F/u repeat CTH 3/3       sertraline 100 milliGRAM(s) Oral daily    ==================== RESPIRATORY======================  Hypercpnic Respiratory failure   - Pt hypercarbic at OSH, placed on BiPAP, now weaned off  - appears comfortable now on 2L NC   - continue to monitor respiratory status   - wean NC as tolerated with goal SpO2 >94%    Hx trach w/ stoma  - Pt trach x2 cycles in past  - most recently in setting of COVID PNA in 2022  - Pt decannulated after admission  - purulent drainage in stoma site, cultured pending results  - ENT states no sign of infection at Stoma site- Plan for possible TE fistula closure when pt is stable as per ENT      ====================CARDIOVASCULAR==================  ACC/AHA stage D systolic heart failure s/p LVAD  - pt appeared hypovolemic on admission  - on and off pressor support w/ Vaso for MAP goal > 70   - starty Midodrine 10 TID and IVF resuscitation PRN   - central sat sent from intro 81.8 with negative lactate x4    Ventricular Tachycardia  - pt has history of VT on previous admissions, takes home mexiletine  - significant NSVT and VT seen on tele in CICU overnight and this AM  - s/p lido bolus 100mg and 50mg and started on lido gtt restarted Mexitil 150 TID       lidocaine   Infusion 1 mG/Min (7.5 mL/Hr) IV Continuous <Continuous>  vasopressin Infusion 0.04 Unit(s)/Min (2.4 mL/Hr) IV Continuous <Continuous>  ===================HEMATOLOGIC/ONC ===================  Anemia  - Hgb on admission 7.4 with subsequent drop to 6.6  - s/p 2u PRBC . Now h/h has been stable >48hrs. Will check cbc q12-24  - of note, pt has hx of GI bleeds  - no blood noted in BM, continue to monitor   - transfuse as needed if hgb <7      ===================== RENAL =========================  RASHAWN  - serum Cr downtrended   - continue to monitor SCr, BUN, lytes, and I&Os  - replete lytes prn with goal K 4-4.5 and mg >2      ==================== GASTROINTESTINAL===================  Hx GI bleed in past req multiple transfusions   - no overt bleeding noted on this admission   - monitor BM for signs of blood. h/h stable for past 24 hrs without any blood noted in BM  - c/w PPI  - Bowel regimen with Senna    Diet  - Jevity 40cc/hr-> incr to 55     cholecalciferol 1000 Unit(s) Oral daily  metoclopramide 10 milliGRAM(s) Oral four times a day  multivitamin 1 Tablet(s) Oral daily  G prophylaxis, pantoprazole  Injectable 40 milliGRAM(s) IV Push daily  senna 2 Tablet(s) Oral at bedtime    =======================    ENDOCRINE  =====================  Hx Hyperthyroidism   - continue home methimazole     maintain q6h ISS while on continuous enteral feeds     dextrose 40% Gel 15 Gram(s) Oral once  dextrose 50% Injectable 25 Gram(s) IV Push once  glucagon  Injectable 1 milliGRAM(s) IntraMuscular once  insulin lispro (ADMELOG) corrective regimen sliding scale   SubCutaneous every 6 hours  methimazole 10 milliGRAM(s) Oral daily      ========================INFECTIOUS DISEASE================  Septic shock  - pt arrived tachycardic 120s, hypotensive MAPs 50-60, AMS  - pt got zosyn and vancomycin x1 at OSH. switched from zosyn to cefepime. s/p vanco-, d/c yesterday as BCX with serratia.   - f/u infectious workup. BCX  gram neg rods x4 bottles-- growing serratia. pending sensitives   - f/u stoma culture sent   - CT C/A/P at OSH: cholecystostomy in place, patchy opacities vs. atelectasis as per written transfer report (CD and official report not sent with transfer)   - OSH BCX prelim gram neg rods in anaerobic bottle. continue to follow [facility] (BCX taken prior to abx initiation)  - ID following- recommending repeat CT C/A/P w/ con to look for source   - c/w Cefepime 1g q12h -       cefepime   IVPB 1000 milliGRAM(s) IV Intermittent every 12 hours

## 2022-03-04 NOTE — PROGRESS NOTE ADULT - ASSESSMENT
Assessment and Recommendation:   · Assessment	  Assessment and recommendation :  Acute hypercapnic respiratory Failure weaned of  positive pressure non invasive ventilator   Septic shock on vasopressin  Bacteremia with serratia marcescens  Severe anemia with low H/H   S/P transfuse one unit of Packed RBCs   Severe ischemic cardiomyopathy   ACC/AHA stage D systolic heart failure  Peripheral vascular Disease   PAF on no ACO   H/O of repeated GI bleeding   S/P mesenteric ischemia   GERD on reglan   S/P HM2 LVAD , VT on Mexiletine   hyperthyroidism on  methimazole   severe protein caloric malnutrition   severe neuropathy   Major depression   S/P tracheostomy X2  pan culture blood and urine done   Continue Antibiotic cefepime   GI prophylaxis   case discussed with CICU   patient is DNR/DNI  estimated  time is 35 minutes

## 2022-03-04 NOTE — PROGRESS NOTE ADULT - ATTENDING COMMENTS
Extensive medical history including HFrEF s/p LVAD transferred to CICU with acute respiratory failure and altered mental status  A+Ox0, non-verbal and follows only minimal commands, CT head benign  LVAD in place, approximately 5 LPM of flow  Septic shock requiring Vasopressin infusion, s/p multiple liters of crystalloid fluid - start Midodrine   Minimal ectopy on Lidocaine infusion and Mexiletine - wean off Lidocaine  SpO2 mid 90s on nasal cannula  Tolerating tube feeds; on PPI BID  Normal renal function, low filling pressures - target even to 500 cc positive  H/H low but acceptable, no lab evidence of hemolysis; no anticoagulation due to prior GI bleeds   Sepsis, blood cultures +Serratia - continue Cefepime (2/28- ), ID is following  Unclear source of infection, concern for LVAD seeding vs. prior splenic abscess   Sugars controlled; continue Methimazole   TLC and nabil 2/28  DNR/DNI per family meeting on 3/3 Extensive medical history including HFrEF s/p LVAD transferred to CICU with acute respiratory failure and altered mental status  A+Ox0, non-verbal and follows only minimal commands, CT head benign  LVAD in place, approximately 5 LPM of flow  Septic shock requiring Vasopressin infusion, s/p multiple liters of crystalloid fluid - start Midodrine and wean off pressors  Minimal ectopy on Lidocaine infusion and Mexiletine - wean off Lidocaine  SpO2 mid 90s on nasal cannula  Tolerating tube feeds; on PPI BID  Normal renal function, low filling pressures - target even to 500 cc positive  H/H low but acceptable, no lab evidence of hemolysis; no anticoagulation due to prior GI bleeds   Sepsis, blood cultures +Serratia - continue Cefepime (2/28- ), ID is following  Unclear source of infection, concern for LVAD seeding vs. prior splenic abscess   Sugars controlled; continue Methimazole   TLC and nabil 2/28  DNR/DNI per family meeting on 3/3

## 2022-03-05 NOTE — PROGRESS NOTE ADULT - ATTENDING COMMENTS
65yo gentleman NICM, HM2 LUKAS TV ring, multiple GI bld, CKD3 COVID x 2, trach, dawn tube and SMA stent readmitted in septic shock. He has had recurrent seratia infection. on mexilitene in past for VT now on lidocaine weaned to 0.5 with one episode overnight. Hb low 7.5. Cr 1.0, Glucose 139-158. Elevated liver enzymes. Neuro mild improvement. Neuro following. Nothing on Ct scan. Video EEG no seizure activity. ID thought possibly secondary cefepime but now improving so will continue. ABG appropriate on room air. Increase secretions from stoma. Send for culture. Vaso restarted this AM for CVP of 1. getting albumin and start midodrine.  Perchole drain in place . Pt is DNR/DNI. Rt IJ 2/27right axillary a-line 2/27 would change today.

## 2022-03-05 NOTE — PROGRESS NOTE ADULT - SUBJECTIVE AND OBJECTIVE BOX
PATIENT:  RICKY JOINT  [id number]    CHIEF COMPLAINT:  Patient is a 66y old  Male who presents with a chief complaint of Septic shock (04 Mar 2022 21:50)      INTERVAL HISTORYOVERNIGHT EVENTS:      REVIEW OF SYSTEMS:    Constitutional:     [ ] negative [ ] fevers [ ] chills [ ] weight loss [ ] weight gain  HEENT:                  [ ] negative [ ] dry eyes [ ] eye irritation [ ] postnasal drip [ ] nasal congestion  CV:                         [ ] negative  [ ] chest pain [ ] orthopnea [ ] palpitations [ ] murmur  Resp:                     [ ] negative [ ] cough [ ] shortness of breath [ ] dyspnea [ ] wheezing [ ] sputum [ ] hemoptysis  GI:                          [ ] negative [ ] nausea [ ] vomiting [ ] diarrhea [ ] constipation [ ] abd pain [ ] dysphagia   :                        [ ] negative [ ] dysuria [ ] nocturia [ ] hematuria [ ] increased urinary frequency  Musculoskeletal: [ ] negative [ ] back pain [ ] myalgias [ ] arthralgias [ ] fracture  Skin:                       [ ] negative [ ] rash [ ] itch  Neurological:        [ ] negative [ ] headache [ ] dizziness [ ] syncope [ ] weakness [ ] numbness  Psychiatric:           [ ] negative [ ] anxiety [ ] depression  Endocrine:            [ ] negative [ ] diabetes [ ] thyroid problem  Heme/Lymph:      [ ] negative [ ] anemia [ ] bleeding problem  Allergic/Immune: [ ] negative [ ] itchy eyes [ ] nasal discharge [ ] hives [ ] angioedema    [ ] All other systems negative  [x ] Unable to assess ROS because __AMS ______.    MEDICATIONS:  MEDICATIONS  (STANDING):  cefepime   IVPB 1000 milliGRAM(s) IV Intermittent every 12 hours  chlorhexidine 2% Cloths 1 Application(s) Topical <User Schedule>  cholecalciferol 1000 Unit(s) Oral daily  insulin lispro (ADMELOG) corrective regimen sliding scale   SubCutaneous every 6 hours  lidocaine   Infusion 0.5 mG/Min (3.75 mL/Hr) IV Continuous <Continuous>  methimazole 10 milliGRAM(s) Oral daily  metoclopramide 10 milliGRAM(s) Oral four times a day  mexiletine 150 milliGRAM(s) Oral every 8 hours  midodrine 10 milliGRAM(s) Oral every 8 hours  multivitamin 1 Tablet(s) Oral daily  pantoprazole  Injectable 40 milliGRAM(s) IV Push every 12 hours  senna 2 Tablet(s) Oral at bedtime  sertraline 100 milliGRAM(s) Oral daily    MEDICATIONS  (PRN):      ALLERGIES:  Allergies    Amiodarone Hydrochloride (Other (Severe))    Intolerances        OBJECTIVE:  ICU Vital Signs Last 24 Hrs  T(C): 37 (05 Mar 2022 06:00), Max: 37.6 (04 Mar 2022 16:00)  T(F): 98.6 (05 Mar 2022 06:00), Max: 99.7 (04 Mar 2022 16:00)  HR: 118 (05 Mar 2022 06:00) (76 - 118)  BP: 99/75 (04 Mar 2022 08:03) (97/76 - 99/75)  BP(mean): 84 (04 Mar 2022 08:03) (82 - 84)  ABP: 122/103 (05 Mar 2022 06:00) (62/57 - 122/103)  ABP(mean): 116 (05 Mar 2022 06:00) (60 - 116)  RR: 37 (05 Mar 2022 06:00) (24 - 41)  SpO2: 95% (05 Mar 2022 06:00) (94% - 100%)      Adult Advanced Hemodynamics Last 24 Hrs  CVP(mm Hg): 5 (05 Mar 2022 06:00) (0 - 20)  CVP(cm H2O): --  CO: --  CI: --  PA: --  PA(mean): --  PCWP: --  SVR: --  SVRI: --  PVR: --  PVRI: --  CAPILLARY BLOOD GLUCOSE      POCT Blood Glucose.: 145 mg/dL (04 Mar 2022 23:11)  POCT Blood Glucose.: 139 mg/dL (04 Mar 2022 17:04)  POCT Blood Glucose.: 159 mg/dL (04 Mar 2022 12:35)    CAPILLARY BLOOD GLUCOSE      POCT Blood Glucose.: 145 mg/dL (04 Mar 2022 23:11)    I&O's Summary    03 Mar 2022 07:01  -  04 Mar 2022 07:00  --------------------------------------------------------  IN: 4402.4 mL / OUT: 1955 mL / NET: 2447.4 mL    04 Mar 2022 07:01  -  05 Mar 2022 06:46  --------------------------------------------------------  IN: 2446.9 mL / OUT: 1797.5 mL / NET: 649.4 mL      Daily     Daily Weight in k.1 (05 Mar 2022 03:00)    PHYSICAL EXAMINATION:  General: WN/WD NAD  HEENT: PERRLA, EOMI, moist mucous membranes  Neurology: A&Ox3, nonfocal, BLANDON x 4  Respiratory: CTA B/L, normal respiratory effort, no wheezes, crackles, rales  CV: RRR, S1S2, no murmurs, rubs or gallops  Abdominal: Soft, NT, ND +BS, Last BM  Extremities: No edema, + peripheral pulses  Incisions:   Tubes:    LABS:  ABG - ( 05 Mar 2022 04:33 )  pH, Arterial: 7.42  pH, Blood: x     /  pCO2: 41    /  pO2: 108   / HCO3: 27    / Base Excess: 1.9   /  SaO2: 99.7                                    7.5    12.72 )-----------( 128      ( 05 Mar 2022 04:34 )             23.4     03-05    134<L>  |  101  |  13  ----------------------------<  158<H>  4.1   |  25  |  1.04    Ca    8.4      05 Mar 2022 04:34  Phos  2.3     03-05  Mg     1.8     03-05    TPro  7.1  /  Alb  2.9<L>  /  TBili  0.3  /  DBili  x   /  AST  19  /  ALT  15  /  AlkPhos  128<H>  03-05    LIVER FUNCTIONS - ( 05 Mar 2022 04:34 )  Alb: 2.9 g/dL / Pro: 7.1 g/dL / ALK PHOS: 128 U/L / ALT: 15 U/L / AST: 19 U/L / GGT: x           PT/INR - ( 05 Mar 2022 04:34 )   PT: 13.3 sec;   INR: 1.15 ratio         PTT - ( 05 Mar 2022 04:34 )  PTT:27.9 sec            Plan:  ====================== NEUROLOGY=====================  AMS  - per nursing home staff pt has been altered for one week  - pt AAOx0, not verbally responding to staff  - continue to monitor neuro status  - CTH at OSH negative for acute pathology   -  CTH: Mild to mod white matter microvascular ischemic disease. No acute pathology   - unable to get Brain MRI  LVAD. consider repeat CTH in a few days if AMS unchanges  - vEEG neg for seizure activity   - c/w sertraline       ==================== RESPIRATORY======================  Hypercpnic Respiratory failure   - Pt hypercarbic at OSH, placed on BiPAP, now weaned off  - appears comfortable now on room air   - continue to monitor respiratory status     Hx trach w/ stoma  - Pt trach x2 cycles in past  - most recently in setting of COVID PNA in 2022  - Pt decannulated after admission  - purulent drainage in stoma site, cultured pending results  - ENT states no sign of infection at Stoma site- Plan for possible TE fistula closure when pt is stable as per ENT       ====================CARDIOVASCULAR==================  ACC/AHA stage D systolic heart failure s/p LVAD  - pt appeared hypovolemic on admission  - on and off pressor support w/ Vaso for MAP goal > 70   - started Midodrine 10 TID and IVF resuscitation PRN, has been responsive to albumin   - central sat sent from intro 81.8 with negative lactate x4    Ventricular Tachycardia  - pt has history of VT on previous admissions, takes home mexiletine  - significant NSVT and VT seen on tele in CICU overnight and this AM  - s/p lido bolus 100mg and 50mg and started on lido gtt restarted Mexitil 150 TID   - maintain lido .5, consider weaning in AM   - monitor on tele, monitor lytes     ===================HEMATOLOGIC/ONC ===================  Anemia  - Hgb on admission 7.4 with subsequent drop to 6.6  - s/p 2u PRBC . Now h/h has been stable >48hrs. Will check cbc q12-24  - of note, pt has hx of GI bleeds  - no blood noted in BM, continue to monitor   - transfuse as needed if hgb <7    ===================== RENAL =========================  RASHAWN  - serum Cr downtrended   - continue to monitor SCr, BUN, lytes, and I&Os  - replete lytes prn with goal K 4-4.5 and mg >2    ==================== GASTROINTESTINAL===================  Hx GI bleed in past req multiple transfusions   - no overt bleeding noted on this admission   - monitor BM for signs of blood. h/h stable for past 24 hrs without any blood noted in BM  - c/w PPI  - Bowel regimen with Senna  - Reglan for gut motility     Diet  - Jevity 55cc/hr      =======================    ENDOCRINE  =====================  Hx Hyperthyroidism   - continue home methimazole     Hyperglycemia   -maintain q6h ISS while on continuous enteral feeds       ========================INFECTIOUS DISEASE================  Septic shock  - pt arrived tachycardic 120s, hypotensive MAPs 50-60, AMS  - pt got zosyn and vancomycin x1 at OSH. switched from zosyn to cefepime. s/p vanco-, d/c yesterday as BCX with serratia.   - f/u infectious workup. BCX  gram neg rods x4 bottles-- growing serratia  - f/u stoma culture sent   - CT C/A/P at OSH: cholecystostomy in place, patchy opacities vs. atelectasis as per written transfer report (CD and official report not sent with transfer)   - c/w Cefepime 1g q12h -      PATIENT:  RICKY JOINT  [id number]    CHIEF COMPLAINT:  Patient is a 66y old  Male who presents with a chief complaint of Septic shock (04 Mar 2022 21:50)      INTERVAL HISTORYOVERNIGHT EVENTS:      REVIEW OF SYSTEMS:    Constitutional:     [ ] negative [ ] fevers [ ] chills [ ] weight loss [ ] weight gain  HEENT:                  [ ] negative [ ] dry eyes [ ] eye irritation [ ] postnasal drip [ ] nasal congestion  CV:                         [ ] negative  [ ] chest pain [ ] orthopnea [ ] palpitations [ ] murmur  Resp:                     [ ] negative [ ] cough [ ] shortness of breath [ ] dyspnea [ ] wheezing [ ] sputum [ ] hemoptysis  GI:                          [ ] negative [ ] nausea [ ] vomiting [ ] diarrhea [ ] constipation [ ] abd pain [ ] dysphagia   :                        [ ] negative [ ] dysuria [ ] nocturia [ ] hematuria [ ] increased urinary frequency  Musculoskeletal: [ ] negative [ ] back pain [ ] myalgias [ ] arthralgias [ ] fracture  Skin:                       [ ] negative [ ] rash [ ] itch  Neurological:        [ ] negative [ ] headache [ ] dizziness [ ] syncope [ ] weakness [ ] numbness  Psychiatric:           [ ] negative [ ] anxiety [ ] depression  Endocrine:            [ ] negative [ ] diabetes [ ] thyroid problem  Heme/Lymph:      [ ] negative [ ] anemia [ ] bleeding problem  Allergic/Immune: [ ] negative [ ] itchy eyes [ ] nasal discharge [ ] hives [ ] angioedema    [ ] All other systems negative  [x ] Unable to assess ROS because __AMS ______.    MEDICATIONS:  MEDICATIONS  (STANDING):  cefepime   IVPB 1000 milliGRAM(s) IV Intermittent every 12 hours  chlorhexidine 2% Cloths 1 Application(s) Topical <User Schedule>  cholecalciferol 1000 Unit(s) Oral daily  insulin lispro (ADMELOG) corrective regimen sliding scale   SubCutaneous every 6 hours  lidocaine   Infusion 0.5 mG/Min (3.75 mL/Hr) IV Continuous <Continuous>  methimazole 10 milliGRAM(s) Oral daily  metoclopramide 10 milliGRAM(s) Oral four times a day  mexiletine 150 milliGRAM(s) Oral every 8 hours  midodrine 10 milliGRAM(s) Oral every 8 hours  multivitamin 1 Tablet(s) Oral daily  pantoprazole  Injectable 40 milliGRAM(s) IV Push every 12 hours  senna 2 Tablet(s) Oral at bedtime  sertraline 100 milliGRAM(s) Oral daily    MEDICATIONS  (PRN):      ALLERGIES:  Allergies    Amiodarone Hydrochloride (Other (Severe))    Intolerances        OBJECTIVE:  ICU Vital Signs Last 24 Hrs  T(C): 37 (05 Mar 2022 06:00), Max: 37.6 (04 Mar 2022 16:00)  T(F): 98.6 (05 Mar 2022 06:00), Max: 99.7 (04 Mar 2022 16:00)  HR: 118 (05 Mar 2022 06:00) (76 - 118)  BP: 99/75 (04 Mar 2022 08:03) (97/76 - 99/75)  BP(mean): 84 (04 Mar 2022 08:03) (82 - 84)  ABP: 122/103 (05 Mar 2022 06:00) (62/57 - 122/103)  ABP(mean): 116 (05 Mar 2022 06:00) (60 - 116)  RR: 37 (05 Mar 2022 06:00) (24 - 41)  SpO2: 95% (05 Mar 2022 06:00) (94% - 100%)      Adult Advanced Hemodynamics Last 24 Hrs  CVP(mm Hg): 5 (05 Mar 2022 06:00) (0 - 20)  CVP(cm H2O): --  CO: --  CI: --  PA: --  PA(mean): --  PCWP: --  SVR: --  SVRI: --  PVR: --  PVRI: --  CAPILLARY BLOOD GLUCOSE      POCT Blood Glucose.: 145 mg/dL (04 Mar 2022 23:11)  POCT Blood Glucose.: 139 mg/dL (04 Mar 2022 17:04)  POCT Blood Glucose.: 159 mg/dL (04 Mar 2022 12:35)    CAPILLARY BLOOD GLUCOSE      POCT Blood Glucose.: 145 mg/dL (04 Mar 2022 23:11)    I&O's Summary    03 Mar 2022 07:01  -  04 Mar 2022 07:00  --------------------------------------------------------  IN: 4402.4 mL / OUT: 1955 mL / NET: 2447.4 mL    04 Mar 2022 07:01  -  05 Mar 2022 06:46  --------------------------------------------------------  IN: 2446.9 mL / OUT: 1797.5 mL / NET: 649.4 mL      Daily     Daily Weight in k.1 (05 Mar 2022 03:00)    PHYSICAL EXAMINATION:  General: WN/WD NAD  HEENT: PERRLA, EOMI, moist mucous membranes  Neurology: A&Ox3, nonfocal, BLANDON x 4  Respiratory: CTA B/L, normal respiratory effort, no wheezes, crackles, rales  CV: RRR, S1S2, no murmurs, rubs or gallops  Abdominal: Soft, NT, ND +BS, Last BM  Extremities: No edema, + peripheral pulses  Incisions:   Tubes:    LABS:  ABG - ( 05 Mar 2022 04:33 )  pH, Arterial: 7.42  pH, Blood: x     /  pCO2: 41    /  pO2: 108   / HCO3: 27    / Base Excess: 1.9   /  SaO2: 99.7                                    7.5    12.72 )-----------( 128      ( 05 Mar 2022 04:34 )             23.4     03-05    134<L>  |  101  |  13  ----------------------------<  158<H>  4.1   |  25  |  1.04    Ca    8.4      05 Mar 2022 04:34  Phos  2.3     03-05  Mg     1.8     03-05    TPro  7.1  /  Alb  2.9<L>  /  TBili  0.3  /  DBili  x   /  AST  19  /  ALT  15  /  AlkPhos  128<H>  03-05    LIVER FUNCTIONS - ( 05 Mar 2022 04:34 )  Alb: 2.9 g/dL / Pro: 7.1 g/dL / ALK PHOS: 128 U/L / ALT: 15 U/L / AST: 19 U/L / GGT: x           PT/INR - ( 05 Mar 2022 04:34 )   PT: 13.3 sec;   INR: 1.15 ratio         PTT - ( 05 Mar 2022 04:34 )  PTT:27.9 sec            Plan:  ====================== NEUROLOGY=====================  AMS  - per nursing home staff pt has been altered for one week  - pt AAOx0, not verbally responding to staff  - continue to monitor neuro status  - CTH at OSH negative for acute pathology   -  CTH: Mild to mod white matter microvascular ischemic disease. No acute pathology   - unable to get Brain MRI  LVAD. consider repeat CTH in a few days if AMS unchanges  - vEEG neg for seizure activity   - c/w sertraline       ==================== RESPIRATORY======================  Hypercpnic Respiratory failure   - Pt hypercarbic at OSH, placed on BiPAP, now weaned off  - appears comfortable now on room air   - continue to monitor respiratory status     Hx trach w/ stoma  - Pt trach x2 cycles in past  - most recently in setting of COVID PNA in 2022  - Pt decannulated after admission  - purulent drainage in stoma site, cultured pending results  - ENT states no sign of infection at Stoma site- Plan for possible TE fistula closure when pt is stable as per ENT       ====================CARDIOVASCULAR==================  ACC/AHA stage D systolic heart failure s/p LVAD + Distributive Shock   - pt appeared hypovolemic on admission  - on and off pressor support w/ Vaso for MAP goal > 70   - started Midodrine 10 TID and IVF resuscitation PRN, has been responsive to albumin   - central sat sent from intro 81.8 with negative lactate x4    Ventricular Tachycardia  - pt has history of VT on previous admissions, takes home mexiletine  - significant NSVT and VT seen on tele in CICU overnight and this AM  - s/p lido bolus 100mg and 50mg and started on lido gtt restarted Mexitil 150 TID   - maintain lido .5, consider weaning in AM   - monitor on tele, monitor lytes     ===================HEMATOLOGIC/ONC ===================  Anemia  - Hgb on admission 7.4 with subsequent drop to 6.6  - s/p 2u PRBC . Now h/h has been stable >48hrs. Will check cbc q12-24  - of note, pt has hx of GI bleeds  - no blood noted in BM, continue to monitor   - transfuse as needed if hgb <7    ===================== RENAL =========================  RASHAWN  - serum Cr downtrended   - continue to monitor SCr, BUN, lytes, and I&Os  - replete lytes prn with goal K 4-4.5 and mg >2    ==================== GASTROINTESTINAL===================  Hx GI bleed in past req multiple transfusions   - no overt bleeding noted on this admission   - monitor BM for signs of blood. h/h stable for past 24 hrs without any blood noted in BM  - c/w PPI  - Bowel regimen with Senna    Diet  - Jevity 55cc/hr      =======================    ENDOCRINE  =====================  Hx Hyperthyroidism   - continue home methimazole     Hyperglycemia   -maintain q6h ISS while on continuous enteral feeds       ========================INFECTIOUS DISEASE================  Septic shock  - pt arrived tachycardic 120s, hypotensive MAPs 50-60, AMS  - pt got zosyn and vancomycin x1 at OSH. switched from zosyn to cefepime. s/p vanco-, d/c yesterday as BCX with serratia.   - BCX  gram neg rods x4 bottles-- growing serratia sensitive to Cefepime/Erta   - f/u stoma culture sent 3/5   - CT C/A/P 3/3 showing incr size of abd aortic thrombus (infected thrombus?)  - c/w Cefepime 1g q12h - , may need 4-6wk of therapy if this is intravascular infection

## 2022-03-05 NOTE — PROGRESS NOTE ADULT - ASSESSMENT
====================ASSESSMENT ==============  65 M Pmh of [facility] w/ VAD, recurrent serratia bacteremia, pneumonia, intubated/trach x2 cycles, chronic gall bladder disease s/p percutaneous cholecystectomy, SMA ischemia s/p stent, cachexia who presented from [facility] for septic shock.        Plan:  ====================== NEUROLOGY=====================  AMS  - per nursing home staff pt has been altered for one week  - pt AAOx0, not verbally responding to staff  - continue to monitor neuro status  - CTH at OSH negative for acute pathology   - 2/28 CTH: Mild to mod white matter microvascular ischemic disease. No acute pathology   - unable to get Brain MRI 2/2 LVAD. consider repeat CTH in a few days if AMS unchanges  - vEEG neg for seizure activity   - c/w sertraline       ==================== RESPIRATORY======================  Hypercpnic Respiratory failure   - Pt hypercarbic at OSH, placed on BiPAP, now weaned off  - appears comfortable now on room air   - continue to monitor respiratory status     Hx trach w/ stoma  - Pt trach x2 cycles in past  - most recently in setting of COVID PNA in 02/2022  - Pt decannulated after admission  - purulent drainage in stoma site, culture noted, f/u sensitivities   - ENT following     ====================CARDIOVASCULAR==================  ACC/AHA stage D systolic heart failure s/p LVAD  - pt appeared hypovolemic on admission  - on and off pressor support w/ Vaso for MAP goal > 70   - Cont midodrine 20 TID,and IVF resuscitation PRN, has been responsive to albumin   - central sat sent from intro 81.8 with negative lactate x4    Ventricular Tachycardia  - pt has history of VT on previous admissions, takes home mexiletine  - significant NSVT and VT seen on tele in CICU overnight and this AM  - s/p lido bolus 100mg and 50mg and started on lido gtt restarted Mexitil 150 TID   - lido weaned today from .5 to off at 330 PM, monitor tele closely   -  monitor lytes     ===================HEMATOLOGIC/ONC ===================  Anemia  - Hgb on admission 7.4 with subsequent drop to 6.6  - s/p 2u PRBC 2/28. Now h/h has been stable >48hrs. Will check cbc q12-24  - of note, pt has hx of GI bleeds  - no blood noted in BM, continue to monitor   - transfuse as needed if hgb <7    ===================== RENAL =========================  RASHAWN  - serum Cr downtrended   - continue to monitor SCr, BUN, lytes, and I&Os  - replete lytes prn with goal K 4-4.5 and mg >2    ==================== GASTROINTESTINAL===================  Hx GI bleed in past req multiple transfusions   - no overt bleeding noted on this admission   - monitor BM for signs of blood. h/h stable for past 24 hrs without any blood noted in BM  - c/w PPI  - Bowel regimen with Senna  - Reglan for gut motility     Diet  - Jevity 55cc/hr      =======================    ENDOCRINE  =====================  Hx Hyperthyroidism   - continue home methimazole     Hyperglycemia   -maintain q6h ISS while on continuous enteral feeds       ========================INFECTIOUS DISEASE================  Septic shock  - pt arrived tachycardic 120s, hypotensive MAPs 50-60, AMS  - pt got zosyn and vancomycin x1 at OSH. switched from zosyn to cefepime. s/p vanco-, d/c yesterday as BCX with serratia.   - f/u infectious workup. BCX 2/28 gram neg rods x4 bottles-- growing serratia  - f/u stoma culture sent   - CT C/A/P at OSH: cholecystostomy in place, patchy opacities vs. atelectasis as per written transfer report (CD and official report not sent with transfer)   - c/w Cefepime 1g q12h 2/28-     GOC  - per family meeting, pt is DNR/DNI       I, Anne Marie Morris, have personally provided 35 minutes of critical care time excluding time spent on separate procedures, in addition to initial critical care time provided by the CICU Attending, Dr. Cortez.

## 2022-03-05 NOTE — PROGRESS NOTE ADULT - SUBJECTIVE AND OBJECTIVE BOX
RICKY HAMPTON  MRN-[id number]  Patient is a 66y old  Male who presents with a chief complaint of Septic shock (05 Mar 2022 10:05)    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 s/p HM2 LVAD in 9/2017 at  (due to severe PAD) with TV ring, multiple GI bleeds, thus maintained off AC, CKD 3prior COVID-19 infection in 4/2020, recurrent syncope post LVAD s/p ILR, s/p prolonged complex hospitalization from 6/14-11/16/2021 initially admitted with abdominal pain complicated by GIB, respiratory failure s/p trach, multiple infections, s/p cholecystotomy tube and SMA stent 10/2021, ultimately discharged to [facility] rehab and then returned to [facility] for trach decannulation 12/2, readmitted in 2/2022 with recurrent COVID-19 infection, initially in distributive shock. Blood cultures were also positive for serratia marcescens which he has had in the past.     Pt was transferred to [facility] CICU from [facility]. He was sent to their ED from a rehab center due to AMS and tachycardia. At [facility], they noted a WBC greater than 20, tachycardia and hypercarbia on ABG. Pt was placed on BiPAP and transferred to [facility] CICU for concern for sepsis vs septic shock.      (27 Feb 2022 22:20)      24 hr events: placed back on vaso .04 for hypotension, midodrine increased from 10-->20mg. Lido discontinued at 330PM.     REVIEW OF SYSTEMS:  unable to assess due to mental status.       Vital Signs Last 24 Hrs  T(C): 36.8 (05 Mar 2022 19:00), Max: 37.2 (04 Mar 2022 23:00)  T(F): 98.2 (05 Mar 2022 19:00), Max: 99 (04 Mar 2022 23:00)  HR: 73 (05 Mar 2022 19:00) (73 - 118)  RR: 27 (05 Mar 2022 19:00) (24 - 38)  SpO2: 98% (05 Mar 2022 19:00) (94% - 99%)        ============================I/O===========================   I&O's Detail    04 Mar 2022 07:01  -  05 Mar 2022 07:00  --------------------------------------------------------  IN:    Albumin 5%  - 250 mL: 250 mL    Enteral Tube Flush: 360 mL    IV PiggyBack: 125 mL    IV PiggyBack: 325 mL    Jevity 1.2: 1320 mL    Lidocaine: 52.5 mL    Lidocaine: 63.8 mL    Vasopressin: 6 mL    Vasopressin: 10.8 mL    Vasopressin: 3.6 mL  Total IN: 2516.7 mL    OUT:    Drain (mL): 52.5 mL    Voided (mL): 1795 mL  Total OUT: 1847.5 mL    Total NET: 669.2 mL      05 Mar 2022 07:01  -  05 Mar 2022 19:27  --------------------------------------------------------  IN:    Albumin 5%  - 250 mL: 500 mL    Enteral Tube Flush: 100 mL    IV PiggyBack: 125 mL    IV PiggyBack: 50 mL    Jevity 1.2: 715 mL    Lidocaine: 30 mL    Vasopressin: 33.6 mL  Total IN: 1553.6 mL    OUT:    Drain (mL): 10 mL    Voided (mL): 685 mL  Total OUT: 695 mL    Total NET: 858.6 mL        ============================ LABS =========================                        7.5    12.72 )-----------( 128      ( 05 Mar 2022 04:34 )             23.4     03-05    134<L>  |  101  |  13  ----------------------------<  158<H>  4.1   |  25  |  1.04    Ca    8.4      05 Mar 2022 04:34  Phos  2.3     03-05  Mg     1.8     03-05    TPro  7.1  /  Alb  2.9<L>  /  TBili  0.3  /  DBili  x   /  AST  19  /  ALT  15  /  AlkPhos  128<H>  03-05    LIVER FUNCTIONS - ( 05 Mar 2022 04:34 )  Alb: 2.9 g/dL / Pro: 7.1 g/dL / ALK PHOS: 128 U/L / ALT: 15 U/L / AST: 19 U/L / GGT: x           PT/INR - ( 05 Mar 2022 04:34 )   PT: 13.3 sec;   INR: 1.15 ratio         PTT - ( 05 Mar 2022 04:34 )  PTT:27.9 sec  ABG - ( 05 Mar 2022 04:33 )  pH, Arterial: 7.42  pH, Blood: x     /  pCO2: 41    /  pO2: 108   / HCO3: 27    / Base Excess: 1.9   /  SaO2: 99.7                ======================Micro/Rad/Cardio=================  Culture: Reviewed   CXR: Reviewed  Echo:Reviewed  ======================================================  PAST MEDICAL & SURGICAL HISTORY:  CHF (congestive heart failure)    CAD (coronary artery disease)    Depression    Pleural effusion    History of 2019 novel coronavirus disease (COVID-19)  april 2020    Hemorrhoids    Bleeding hemorrhoids    Peripheral arterial disease    Claudication    BPH with urinary obstruction    ACC/AHA stage D systolic heart failure    Anticoagulation goal of INR 2.0 to 2.5    Falls    Clavicle fracture    CKD (chronic kidney disease), stage III    Iron deficiency anemia    H/O epistaxis    Vertigo    GI bleed    S/P TVR (tricuspid valve repair)    S/P ventricular assist device    S/P endoscopy    H/O tracheostomy

## 2022-03-05 NOTE — PROGRESS NOTE ADULT - SUBJECTIVE AND OBJECTIVE BOX
RICKY JOINT  MRN#: [id number]  Subjective: pulmonary progress note  : acute hypercapnic respiratory failure . Septic shock , service rendered on 2002   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 s/p HM2 LVAD in 2017 at  (due to severe PAD) with TV ring, multiple GI bleeds, thus maintained off AC, CKD 3prior COVID-19 infection in 2020, recurrent syncope post LVAD s/p ILR, s/p prolonged complex hospitalization from -2021 initially admitted with abdominal pain complicated by GIB, respiratory failure s/p trach, multiple infections, s/p cholecystotomy tube and SMA stent 10/2021, ultimately discharged to [facility] rehab and then returned to [facility] for trach decannulation , readmitted in 2022 with recurrent COVID-19 infection, initially in distributive shock. Blood cultures were also positive for serratia marcescens which he has had in the past. seen by heart failure team patient is DNR/DNI  continue on 3 liter nasal 02 and vasopressin, considered Merik prtocol         (2022 22:20)    PAST MEDICAL & SURGICAL HISTORY:  CHF (congestive heart failure)    CAD (coronary artery disease)  Depression    Pleural effusion    History of 2019 novel coronavirus disease (COVID-19)  2020    Hemorrhoids    Bleeding hemorrhoids    Peripheral arterial disease    Claudication    BPH with urinary obstruction    ACC/AHA stage D systolic heart failure    Anticoagulation goal of INR 2.0 to 2.5    Falls    Clavicle fracture    CKD (chronic kidney disease), stage III    Iron deficiency anemia    H/O epistaxis    Vertigo    GI bleed    S/P TVR (tricuspid valve repair)    S/P ventricular assist device    S/P endoscopy    H/O tracheostomy        FAMILY HISTORY:    Social History:    Marital Status:  X    (   ) Single    (   )    (  )   Occupation: Retired   Lives with: (  ) alone  (  ) children  X spouse   (  ) parents  (  ) other    Substance Use (street drugs): X never used  (  ) other:  Tobacco Usage: X never smoked   (   ) former smoker   (   ) current smoker  (     ) pack year  (    ) last cigarette date  Alcohol Usage: Social         OBJECTIVE:  ICU Vital Signs Last 24 Hrs  T(C): 37.2 (01 Mar 2022 16:00), Max: 38.4 (01 Mar 2022 09:00)  T(F): 99 (01 Mar 2022 16:00), Max: 101.1 (01 Mar 2022 09:00)  HR: 109 (01 Mar 2022 18:00) (67 - 124)  BP: --  BP(mean): 86 (2022 20:00) (86 - 86)  ABP: 89/74 (01 Mar 2022 18:00) (75/63 - 160/85)  ABP(mean): 81 (01 Mar 2022 18:00) (68 - 139)  RR: 38 (01 Mar 2022 18:00) (5 - 40)  SpO2: 99% (01 Mar 2022 18:00) (94% - 100%)       @ 07: @ 07:00  --------------------------------------------------------  IN: 1060.7 mL / OUT: 1600 mL / NET: -539.3 mL     @ 07: @ 18:22  --------------------------------------------------------  IN: 677.8 mL / OUT: 455 mL / NET: 222.8 mL    PHYSICAL EXAM :Daily Height in cm: 177.8 (2022 21:20)  Elderly frail mail weaned off  positive pressure non invasive ventilator I/E ratio is14/5 on 3 liter nasal cannula on vasopressin    Daily Weight in k.7 (2022 21:20)  HEENT:     + NCAT  + EOMI  - Conjuctival edema   - Icterus   - Thrush   - ETT  - NGT/OGT  Neck:         + FROM RT IJ  JVD     - Nodes     - Masses    + Mid-line trachea   - Tracheostomy  Chest:           mild kyphosis   Lungs:          + CTA  + Rhonchi  + Rales    - Wheezing  + Decreased BS   - Dullness R L  Cardiac:       + S1 + S2    + RRR   - Irregular   - S3  - S4    - Murmurs   - Rub   - Hamman’s sign   Abdomen:    + BS     + Soft    + Non-tender  - Distended  - Organomegaly  - PEG Cholecystostomy tube in place   Extremities:   - Cyanosis U/L   - Clubbing  U/L  - LE/UE Edema   + Capillary refill    + Pulses   Neuro:        + Awake   +  Alert   - Confused   - Lethargic   - Sedated   - Generalized Weakness  Skin:        - Rashes    - Erythema   + Normal incisions   + IV sites intact  -      HOSPITAL MEDICATIONS: All mediciations reviewed and analyzed  MEDICATIONS  (STANDING):  albumin human  5% IVPB 250 milliLiter(s) IV Intermittent once  cefepime   IVPB 2000 milliGRAM(s) IV Intermittent every 12 hours  chlorhexidine 2% Cloths 1 Application(s) Topical <User Schedule>  cholecalciferol 1000 Unit(s) Oral daily  dextrose 40% Gel 15 Gram(s) Oral once  dextrose 50% Injectable 25 Gram(s) IV Push once  gabapentin Solution 100 milliGRAM(s) Oral three times a day  glucagon  Injectable 1 milliGRAM(s) IntraMuscular once  insulin lispro (ADMELOG) corrective regimen sliding scale   SubCutaneous every 6 hours  magnesium sulfate  IVPB 1 Gram(s) IV Intermittent once  methimazole 10 milliGRAM(s) Oral daily  metoclopramide 10 milliGRAM(s) Oral four times a day  mexiletine 150 milliGRAM(s) Oral every 8 hours  mirtazapine 7.5 milliGRAM(s) Oral at bedtime  multivitamin 1 Tablet(s) Oral daily  pantoprazole  Injectable 40 milliGRAM(s) IV Push daily  potassium phosphate IVPB 15 milliMole(s) IV Intermittent once  senna 2 Tablet(s) Oral at bedtime  sertraline 100 milliGRAM(s) Oral daily  vancomycin  IVPB 750 milliGRAM(s) IV Intermittent every 24 hours  vasopressin Infusion 0.02 Unit(s)/Min (1.2 mL/Hr) IV Continuous <Continuous>    MEDICATIONS  (PRN):    LABS: All Lab data reviewed and analyzed                        7.5    12 )-----------( 128      ( 05 Mar 2022 04:34 )             23.4    03-05    134<L>  |  101  |  13  ----------------------------<  158<H>  4.1   |  25  |  1.04    Ca    8.4      05 Mar 2022 04:34  Phos  2.3     03-05  Mg     1.8     03-05    TPro  7.1  /  Alb  2.9<L>  /  TBili  0.3  /  DBili  x   /  AST  19  /  ALT  15  /  AlkPhos  128<H>  03-05    Ca    8.6      04 Mar 2022 00:50  Phos  1.9     03-04  Mg     1.8     03-04         PTT - ( 01 Mar 2022 00:08 )  PTT:25.0 sec LIVER FUNCTIONS - ( 01 Mar 2022 17:38 )  Alb: 2.9 g/dL / Pro: 7.3 g/dL / ALK PHOS: 116 U/L / ALT: 30 U/L / AST: 29 U/L / GGT: x           RADIOLOGY: - Reviewed and analyzed

## 2022-03-05 NOTE — PROGRESS NOTE ADULT - ASSESSMENT
Assessment and Recommendation:   · Assessment	  Assessment and recommendation :  Acute hypercapnic respiratory Failure weaned of  positive pressure non invasive ventilator   Septic shock on vasopressin  considered Merik protocol of Vitamin C , thiamine and hydrocortisone    Bacteremia with serratia marcescens  Severe anemia with low H/H   S/P transfuse one unit of Packed RBCs   Severe ischemic cardiomyopathy   ACC/AHA stage D systolic heart failure  Peripheral vascular Disease   PAF on no ACO   H/O of repeated GI bleeding   S/P mesenteric ischemia   GERD on reglan   S/P HM2 LVAD , VT on Mexiletine   hyperthyroidism on  methimazole   severe protein caloric malnutrition   severe neuropathy   Major depression   S/P tracheostomy X2  pan culture blood and urine done   Continue Antibiotic cefepime   GI prophylaxis   case discussed with CICU   patient is DNR/DNI  estimated  time is 35 minutes

## 2022-03-06 NOTE — PROGRESS NOTE ADULT - ASSESSMENT
A/P: 65M Hx ESHF w/ VAD, recurrent serratia bacteremia, pneumonia, intubated/trach x2 cycles, chronic gall bladder disease s/p percutaneous cholecystectomy, SMA ischemia s/p stent, cachexia who presented from [facility] for septic shock.    ====================== NEUROLOGY=====================  AMS  - per nursing home staff pt has been altered for one week  - pt AAOx0, not verbally responding to staff  - continue to monitor neuro status  - CTH at OSH negative for acute pathology   - 2/28 CTH: Mild to mod white matter microvascular ischemic disease. No acute pathology   - unable to get Brain MRI 2/2 LVAD. consider repeat CTH in a few days if AMS unchanges  - vEEG neg for seizure activity   - c/w sertraline     ==================== RESPIRATORY======================  Hypercpnic Respiratory failure   - Pt hypercarbic at OSH, placed on BiPAP, now weaned off  - appears comfortable now on room air   - continue to monitor respiratory status     Hx trach w/ stoma  - Pt trach x2 cycles in past  - most recently in setting of COVID PNA in 02/2022  - Pt decannulated after admission  - purulent drainage in stoma site, cultured pending results  - ENT states no sign of infection at Stoma site- Plan for possible TE fistula closure when pt is stable as per ENT     ====================CARDIOVASCULAR==================  ACC/AHA stage D systolic heart failure s/p LVAD + Distributive Shock   - pt appeared hypovolemic on admission  - on and off pressor support w/ Vaso for MAP goal > 70   - started Midodrine 10 TID and IVF resuscitation PRN, has been responsive to albumin   - central sat sent from intro 81.8 with negative lactate x4    Ventricular Tachycardia  - pt has history of VT on previous admissions, takes home mexiletine  - significant NSVT and VT seen on tele in CICU overnight and this AM  - s/p lido bolus 100mg and 50mg and started on lido gtt restarted Mexitil 150 TID   - maintain lido .5, consider weaning in AM   - monitor on tele, monitor lytes     ===================HEMATOLOGIC/ONC ===================  Anemia  - Hgb on admission 7.4 with subsequent drop to 6.6  - s/p 2u PRBC 2/28. Now h/h has been stable >48hrs. Will check cbc q12-24  - of note, pt has hx of GI bleeds  - no blood noted in BM, continue to monitor   - transfuse as needed if hgb <7    ===================== RENAL =========================  RASHAWN  - serum Cr downtrended   - continue to monitor SCr, BUN, lytes, and I&Os  - replete lytes prn with goal K 4-4.5 and mg >2    ==================== GASTROINTESTINAL===================  Hx GI bleed in past req multiple transfusions   - no overt bleeding noted on this admission   - monitor BM for signs of blood. h/h stable for past 24 hrs without any blood noted in BM  - c/w PPI  - Bowel regimen with Senna    Diet  - Jevity 55cc/hr  =======================    ENDOCRINE  =====================  Hx Hyperthyroidism   - continue home methimazole     Hyperglycemia   -maintain q6h ISS while on continuous enteral feeds     ========================INFECTIOUS DISEASE================  Septic shock  - pt arrived tachycardic 120s, hypotensive MAPs 50-60, AMS  - pt got zosyn and vancomycin x1 at OSH. switched from zosyn to cefepime. s/p vanco-, d/c yesterday as BCX with serratia.   - BCX 2/28 gram neg rods x4 bottles-- growing serratia sensitive to Cefepime/Erta   - f/u stoma culture sent 3/5   - CT C/A/P 3/3 showing incr size of abd aortic thrombus (infected thrombus?)  - c/w Cefepime 1g q12h 2/28- , may need 4-6wk of therapy if this is intravascular infection     ======================= DISPOSITION  =====================  [X] Critical   [ ] Guarded    [ ] Stable    [X] Maintain in CICU  [ ] Downgrade to Telemetry  [ ] Discharge Home    Patient requires continuous monitoring with bedside rhythm monitoring, pulse ox monitoring, and intermittent blood gas analysis. Care plan discussed with ICU care team. Patient remained critical and at risk for life threatening decompensation.  Patient seen, examined and plan discussed with CCU team during rounds.     I have personally provided 30 minutes of critical care time excluding time spent on separate procedures, in addition to initial critical care time provided by the CICU Attending, Dr. Jensen/ Suzy/ Anneliese/ Mick/ Jeannette/ Ted .       Leann Cronin [facility] [phone]

## 2022-03-06 NOTE — PROGRESS NOTE ADULT - ATTENDING COMMENTS
67 yo NICM HM2 LVAD PAD multiple GI bld, cholecystectomy tube,SMA stent, with recurrent serratia bacteremia , neg video EEG for MS changes,recurrent VT on mexiletene, being treated for UTi. He is not responding to creole  or in English. Social situation not optimal as well. Still distributive shock received PRBC and off vaso for the last hour. On mexiletene for NSVT. renal function stable. Currently on tube feeds. BM overnight. Glucose is stable. ID recommends cefepime 4-6 weeks because large abdominal thrombus may be infected. Needs PICC line before d/c central line.

## 2022-03-06 NOTE — PROGRESS NOTE ADULT - SUBJECTIVE AND OBJECTIVE BOX
PATIENT:  RICKY JOINT  [id number]    CHIEF COMPLAINT:  Patient is a 66y old  Male who presents with a chief complaint of Septic shock (05 Mar 2022 19:26)      INTERVAL HISTORYOVERNIGHT EVENTS:      REVIEW OF SYSTEMS:    Constitutional:     [ ] negative [ ] fevers [ ] chills [ ] weight loss [ ] weight gain  HEENT:                  [ ] negative [ ] dry eyes [ ] eye irritation [ ] postnasal drip [ ] nasal congestion  CV:                         [ ] negative  [ ] chest pain [ ] orthopnea [ ] palpitations [ ] murmur  Resp:                     [ ] negative [ ] cough [ ] shortness of breath [ ] dyspnea [ ] wheezing [ ] sputum [ ] hemoptysis  GI:                          [ ] negative [ ] nausea [ ] vomiting [ ] diarrhea [ ] constipation [ ] abd pain [ ] dysphagia   :                        [ ] negative [ ] dysuria [ ] nocturia [ ] hematuria [ ] increased urinary frequency  Musculoskeletal: [ ] negative [ ] back pain [ ] myalgias [ ] arthralgias [ ] fracture  Skin:                       [ ] negative [ ] rash [ ] itch  Neurological:        [ ] negative [ ] headache [ ] dizziness [ ] syncope [ ] weakness [ ] numbness  Psychiatric:           [ ] negative [ ] anxiety [ ] depression  Endocrine:            [ ] negative [ ] diabetes [ ] thyroid problem  Heme/Lymph:      [ ] negative [ ] anemia [ ] bleeding problem  Allergic/Immune: [ ] negative [ ] itchy eyes [ ] nasal discharge [ ] hives [ ] angioedema    [ ] All other systems negative  [x ] Unable to assess ROS because AMS________.    MEDICATIONS:  MEDICATIONS  (STANDING):  cefepime   IVPB 1000 milliGRAM(s) IV Intermittent every 12 hours  chlorhexidine 2% Cloths 1 Application(s) Topical <User Schedule>  cholecalciferol 1000 Unit(s) Oral daily  insulin lispro (ADMELOG) corrective regimen sliding scale   SubCutaneous every 6 hours  methimazole 10 milliGRAM(s) Oral daily  mexiletine 150 milliGRAM(s) Oral every 8 hours  midodrine 20 milliGRAM(s) Oral every 8 hours  multivitamin 1 Tablet(s) Oral daily  pantoprazole  Injectable 40 milliGRAM(s) IV Push every 12 hours  senna 2 Tablet(s) Oral at bedtime  sertraline 100 milliGRAM(s) Oral daily  vasopressin Infusion 0.02 Unit(s)/Min (1.2 mL/Hr) IV Continuous <Continuous>    MEDICATIONS  (PRN):      ALLERGIES:  Allergies    Amiodarone Hydrochloride (Other (Severe))    Intolerances        OBJECTIVE:  ICU Vital Signs Last 24 Hrs  T(C): 36.7 (06 Mar 2022 03:00), Max: 37.2 (05 Mar 2022 09:00)  T(F): 98.1 (06 Mar 2022 03:00), Max: 99 (05 Mar 2022 09:00)  HR: 81 (06 Mar 2022 06:00) (73 - 97)  BP: --  BP(mean): --  ABP: 100/76 (06 Mar 2022 06:00) (74/59 - 105/76)  ABP(mean): 86 (06 Mar 2022 06:00) (66 - 91)  RR: 40 (06 Mar 2022 06:00) (25 - 43)  SpO2: 96% (06 Mar 2022 06:00) (96% - 100%)      Adult Advanced Hemodynamics Last 24 Hrs  CVP(mm Hg): 30 (06 Mar 2022 06:00) (-3 - 30)  CVP(cm H2O): --  CO: --  CI: --  PA: --  PA(mean): --  PCWP: --  SVR: --  SVRI: --  PVR: --  PVRI: --  CAPILLARY BLOOD GLUCOSE      POCT Blood Glucose.: 173 mg/dL (06 Mar 2022 06:23)  POCT Blood Glucose.: 190 mg/dL (06 Mar 2022 00:18)  POCT Blood Glucose.: 187 mg/dL (05 Mar 2022 17:25)  POCT Blood Glucose.: 192 mg/dL (05 Mar 2022 11:25)    CAPILLARY BLOOD GLUCOSE      POCT Blood Glucose.: 173 mg/dL (06 Mar 2022 06:23)    I&O's Summary    05 Mar 2022 07:01  -  06 Mar 2022 07:00  --------------------------------------------------------  IN: 2530.2 mL / OUT: 1105 mL / NET: 1425.2 mL      Daily     Daily Weight in k (06 Mar 2022 05:00)    PHYSICAL EXAMINATION:  General: WN/WD NAD  HEENT: PERRLA, EOMI, moist mucous membranes  Neurology: A&Ox3, nonfocal, BLANDON x 4  Respiratory: CTA B/L, normal respiratory effort, no wheezes, crackles, rales  CV: RRR, S1S2, no murmurs, rubs or gallops  Abdominal: Soft, NT, ND +BS, Last BM  Extremities: No edema, + peripheral pulses  Incisions:   Tubes:    LABS:  ABG - ( 05 Mar 2022 04:33 )  pH, Arterial: 7.42  pH, Blood: x     /  pCO2: 41    /  pO2: 108   / HCO3: 27    / Base Excess: 1.9   /  SaO2: 99.7                                    6.9    13.09 )-----------( 135      ( 06 Mar 2022 02:30 )             22.0     03-05    134<L>  |  101  |  13  ----------------------------<  158<H>  4.1   |  25  |  1.04    Ca    8.4      05 Mar 2022 04:34  Phos  2.3     03-05  Mg     1.8     03-05    TPro  7.1  /  Alb  2.9<L>  /  TBili  0.3  /  DBili  x   /  AST  19  /  ALT  15  /  AlkPhos  128<H>  03-05    LIVER FUNCTIONS - ( 05 Mar 2022 04:34 )  Alb: 2.9 g/dL / Pro: 7.1 g/dL / ALK PHOS: 128 U/L / ALT: 15 U/L / AST: 19 U/L / GGT: x           PT/INR - ( 05 Mar 2022 04:34 )   PT: 13.3 sec;   INR: 1.15 ratio         PTT - ( 06 Mar 2022 02:30 )  PTT:27.8 sec          Plan:  ====================== NEUROLOGY=====================  AMS  - per nursing home staff pt has been altered for one week  - pt AAOx0, not verbally responding to staff  - continue to monitor neuro status  - CTH at OSH negative for acute pathology   -  CTH: Mild to mod white matter microvascular ischemic disease. No acute pathology   - unable to get Brain MRI 2/2 LVAD. consider repeat CTH in a few days if AMS unchanges  - vEEG neg for seizure activity   - c/w sertraline       ==================== RESPIRATORY======================  Hypercpnic Respiratory failure   - Pt hypercarbic at OSH, placed on BiPAP, now weaned off  - appears comfortable now on room air   - continue to monitor respiratory status     Hx trach w/ stoma  - Pt trach x2 cycles in past  - most recently in setting of COVID PNA in 2022  - Pt decannulated after admission  - purulent drainage in stoma site, cultured pending results  - ENT states no sign of infection at Stoma site- Plan for possible TE fistula closure when pt is stable as per ENT       ====================CARDIOVASCULAR==================  ACC/AHA stage D systolic heart failure s/p LVAD + Distributive Shock   - pt appeared hypovolemic on admission  - on and off pressor support w/ Vaso for MAP goal > 70   - started Midodrine 10 TID and IVF resuscitation PRN, has been responsive to albumin   - central sat sent from intro 81.8 with negative lactate x4    Ventricular Tachycardia  - pt has history of VT on previous admissions, takes home mexiletine  - significant NSVT and VT seen on tele in CICU overnight and this AM  - s/p lido bolus 100mg and 50mg and started on lido gtt restarted Mexitil 150 TID   - maintain lido .5, consider weaning in AM   - monitor on tele, monitor lytes     ===================HEMATOLOGIC/ONC ===================  Anemia  - Hgb on admission 7.4 with subsequent drop to 6.6  - s/p 2u PRBC . Now h/h has been stable >48hrs. Will check cbc q12-24  - of note, pt has hx of GI bleeds  - no blood noted in BM, continue to monitor   - transfuse as needed if hgb <7    ===================== RENAL =========================  RASHAWN  - serum Cr downtrended   - continue to monitor SCr, BUN, lytes, and I&Os  - replete lytes prn with goal K 4-4.5 and mg >2    ==================== GASTROINTESTINAL===================  Hx GI bleed in past req multiple transfusions   - no overt bleeding noted on this admission   - monitor BM for signs of blood. h/h stable for past 24 hrs without any blood noted in BM  - c/w PPI  - Bowel regimen with Senna    Diet  - Jevity 55cc/hr      =======================    ENDOCRINE  =====================  Hx Hyperthyroidism   - continue home methimazole     Hyperglycemia   -maintain q6h ISS while on continuous enteral feeds       ========================INFECTIOUS DISEASE================  Septic shock  - pt arrived tachycardic 120s, hypotensive MAPs 50-60, AMS  - pt got zosyn and vancomycin x1 at OSH. switched from zosyn to cefepime. s/p vanco-, d/c yesterday as BCX with serratia.   - BCX  gram neg rods x4 bottles-- growing serratia sensitive to Cefepime/Erta   - f/u stoma culture sent 3/5   - CT C/A/P 3/3 showing incr size of abd aortic thrombus (infected thrombus?)  - c/w Cefepime 1g q12h - , may need 4-6wk of therapy if this is intravascular infection

## 2022-03-06 NOTE — PROGRESS NOTE ADULT - SUBJECTIVE AND OBJECTIVE BOX
RICKY JOINT  MRN#: [id number]  Subjective: pulmonary progress note  : acute hypercapnic respiratory failure . Septic shock , service rendered on 2002   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 s/p HM2 LVAD in 2017 at  (due to severe PAD) with TV ring, multiple GI bleeds, thus maintained off AC, CKD 3prior COVID-19 infection in 2020, recurrent syncope post LVAD s/p ILR, s/p prolonged complex hospitalization from -2021 initially admitted with abdominal pain complicated by GIB, respiratory failure s/p trach, multiple infections, s/p cholecystotomy tube and SMA stent 10/2021, ultimately discharged to [facility] rehab and then returned to [facility] for trach decannulation , readmitted in 2022 with recurrent COVID-19 infection, initially in distributive shock. Blood cultures were also positive for serratia marcescens which he has had in the past. seen by heart failure team patient is DNR/DNI  continue on 3 liter nasal 02 and vasopressin, considered Merik  prtocol , no change pressor dependent          (2022 22:20)    PAST MEDICAL & SURGICAL HISTORY:  CHF (congestive heart failure)    CAD (coronary artery disease)  Depression    Pleural effusion    History of 2019 novel coronavirus disease (COVID-19)  2020    Hemorrhoids    Bleeding hemorrhoids    Peripheral arterial disease    Claudication    BPH with urinary obstruction    ACC/AHA stage D systolic heart failure    Anticoagulation goal of INR 2.0 to 2.5    Falls    Clavicle fracture    CKD (chronic kidney disease), stage III    Iron deficiency anemia    H/O epistaxis    Vertigo    GI bleed    S/P TVR (tricuspid valve repair)    S/P ventricular assist device    S/P endoscopy    H/O tracheostomy        FAMILY HISTORY:    Social History:    Marital Status:  X    (   ) Single    (   )    (  )   Occupation: Retired   Lives with: (  ) alone  (  ) children  X spouse   (  ) parents  (  ) other    Substance Use (street drugs): X never used  (  ) other:  Tobacco Usage: X never smoked   (   ) former smoker   (   ) current smoker  (     ) pack year  (    ) last cigarette date  Alcohol Usage: Social         OBJECTIVE:  ICU Vital Signs Last 24 Hrs  T(C): 37.2 (01 Mar 2022 16:00), Max: 38.4 (01 Mar 2022 09:00)  T(F): 99 (01 Mar 2022 16:00), Max: 101.1 (01 Mar 2022 09:00)  HR: 109 (01 Mar 2022 18:00) (67 - 124)  BP: --  BP(mean): 86 (2022 20:00) (86 - 86)  ABP: 89/74 (01 Mar 2022 18:00) (75/63 - 160/85)  ABP(mean): 81 (01 Mar 2022 18:00) (68 - 139)  RR: 38 (01 Mar 2022 18:00) (5 - 40)  SpO2: 99% (01 Mar 2022 18:00) (94% - 100%)       @ 07: @ 07:00  --------------------------------------------------------  IN: 1060.7 mL / OUT: 1600 mL / NET: -539.3 mL     @ 07: @ 18:22  --------------------------------------------------------  IN: 677.8 mL / OUT: 455 mL / NET: 222.8 mL    PHYSICAL EXAM :Daily Height in cm: 177.8 (2022 21:20)  Elderly frail mail weaned off  positive pressure non invasive ventilator I/E ratio is14/5 on 3 liter nasal cannula on vasopressin    Daily Weight in k.7 (2022 21:20)  HEENT:     + NCAT  + EOMI  - Conjuctival edema   - Icterus   - Thrush   - ETT  - NGT/OGT  Neck:         + FROM RT IJ  JVD     - Nodes     - Masses    + Mid-line trachea   - Tracheostomy  Chest:           mild kyphosis   Lungs:          + CTA  + Rhonchi  + Rales    - Wheezing  + Decreased BS   - Dullness R L  Cardiac:       + S1 + S2    + RRR   - Irregular   - S3  - S4    - Murmurs   - Rub   - Hamman’s sign   Abdomen:    + BS     + Soft    + Non-tender  - Distended  - Organomegaly  - PEG Cholecystostomy tube in place   Extremities:   - Cyanosis U/L   - Clubbing  U/L  - LE/UE Edema   + Capillary refill    + Pulses   Neuro:        + Awake   +  Alert   - Confused   - Lethargic   - Sedated   - Generalized Weakness  Skin:        - Rashes    - Erythema   + Normal incisions   + IV sites intact  -      HOSPITAL MEDICATIONS: All mediciations reviewed and analyzed  MEDICATIONS  (STANDING):  albumin human  5% IVPB 250 milliLiter(s) IV Intermittent once  cefepime   IVPB 2000 milliGRAM(s) IV Intermittent every 12 hours  chlorhexidine 2% Cloths 1 Application(s) Topical <User Schedule>  cholecalciferol 1000 Unit(s) Oral daily  dextrose 40% Gel 15 Gram(s) Oral once  dextrose 50% Injectable 25 Gram(s) IV Push once  gabapentin Solution 100 milliGRAM(s) Oral three times a day  glucagon  Injectable 1 milliGRAM(s) IntraMuscular once  insulin lispro (ADMELOG) corrective regimen sliding scale   SubCutaneous every 6 hours  magnesium sulfate  IVPB 1 Gram(s) IV Intermittent once  methimazole 10 milliGRAM(s) Oral daily  metoclopramide 10 milliGRAM(s) Oral four times a day  mexiletine 150 milliGRAM(s) Oral every 8 hours  mirtazapine 7.5 milliGRAM(s) Oral at bedtime  multivitamin 1 Tablet(s) Oral daily  pantoprazole  Injectable 40 milliGRAM(s) IV Push daily  potassium phosphate IVPB 15 milliMole(s) IV Intermittent once  senna 2 Tablet(s) Oral at bedtime  sertraline 100 milliGRAM(s) Oral daily  vancomycin  IVPB 750 milliGRAM(s) IV Intermittent every 24 hours  vasopressin Infusion 0.02 Unit(s)/Min (1.2 mL/Hr) IV Continuous <Continuous>    MEDICATIONS  (PRN):    LABS: All Lab data reviewed and analyzed                        8.2    14.50 )-----------( 153      ( 06 Mar 2022 09:42 )             26.2    03-05    134<L>  |  101  |  13  ----------------------------<  158<H>  4.1   |  25  |  1.04    Ca    8.4      05 Mar 2022 04:34  Phos  2.3     03-05  Mg     1.8     03-05    TPro  7.1  /  Alb  2.9<L>  /  TBili  0.3  /  DBili  x   /  AST  19  /  ALT  15  /  AlkPhos  128<H>  03-    Ca    8.4      05 Mar 2022 04:34  Phos  2.3     03-05  Mg     1.8     03-05    TPro  7.1  /  Alb  2.9<L>  /  TBili  0.3  /  DBili  x   /  AST  19  /  ALT  15  /  AlkPhos  128<H>  03-    Ca    8.6      04 Mar 2022 00:50  Phos  1.9     03-04  Mg     1.8     03-04         PTT - ( 01 Mar 2022 00:08 )  PTT:25.0 sec LIVER FUNCTIONS - ( 01 Mar 2022 17:38 )  Alb: 2.9 g/dL / Pro: 7.3 g/dL / ALK PHOS: 116 U/L / ALT: 30 U/L / AST: 29 U/L / GGT: x           RADIOLOGY: - Reviewed and analyzed

## 2022-03-06 NOTE — PROGRESS NOTE ADULT - SUBJECTIVE AND OBJECTIVE BOX
RICKY HAMPTON  MRN-[id number]  Patient is a 66y old  Male who presents with a chief complaint of Septic shock (06 Mar 2022 15:18)    HPI:  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 s/p HM2 LVAD in 9/2017 at  (due to severe PAD) with TV ring, multiple GI bleeds, thus maintained off AC, CKD 3prior COVID-19 infection in 4/2020, recurrent syncope post LVAD s/p ILR, s/p prolonged complex hospitalization from 6/14-11/16/2021 initially admitted with abdominal pain complicated by GIB, respiratory failure s/p trach, multiple infections, s/p cholecystotomy tube and SMA stent 10/2021, ultimately discharged to [facility] rehab and then returned to [facility] for trach decannulation 12/2, readmitted in 2/2022 with recurrent COVID-19 infection, initially in distributive shock. Blood cultures were also positive for serratia marcescens which he has had in the past.     Pt was transferred to [facility] CICU from [facility]. He was sent to their ED from a rehab center due to AMS and tachycardia. At [facility], they noted a WBC greater than 20, tachycardia and hypercarbia on ABG. Pt was placed on BiPAP and transferred to [facility] CICU for concern for sepsis vs septic shock.     REVIEW OF SYSTEMS:    CONSTITUTIONAL: No weakness, fevers or chills  EYES/ENT: No visual changes;  No vertigo or throat pain   NECK: No pain or stiffness  RESPIRATORY: No cough, wheezing, hemoptysis; No shortness of breath  CARDIOVASCULAR: No chest pain or palpitations  GASTROINTESTINAL: No abdominal or epigastric pain. No nausea, vomiting, or hematemesis; No diarrhea or constipation. No melena or hematochezia.  GENITOURINARY: No dysuria, frequency or hematuria  NEUROLOGICAL: No numbness or weakness  SKIN: No itching, rashes    ICU Vital Signs Last 24 Hrs  T(C): 37.4 (06 Mar 2022 20:00), Max: 37.4 (06 Mar 2022 20:00)  T(F): 99.3 (06 Mar 2022 20:00), Max: 99.3 (06 Mar 2022 20:00)  HR: 92 (06 Mar 2022 20:00) (80 - 99)  ABP: 98/74 (06 Mar 2022 20:00) (74/59 - 108/82)  ABP(mean): 85 (06 Mar 2022 20:00) (66 - 94)  RR: 33 (06 Mar 2022 20:00) (22 - 43)  SpO2: 97% (06 Mar 2022 20:00) (96% - 99%)      CVP(mm Hg): -1 (03-06-22 @ 20:00) (-3 - 30)    I&O's Summary    05 Mar 2022 07:01  -  06 Mar 2022 07:00  --------------------------------------------------------  IN: 2530.2 mL / OUT: 1105 mL / NET: 1425.2 mL    06 Mar 2022 07:01  -  06 Mar 2022 20:33  --------------------------------------------------------  IN: 1555 mL / OUT: 695 mL / NET: 860 mL      CAPILLARY BLOOD GLUCOSE  POCT Blood Glucose.: 175 mg/dL (06 Mar 2022 17:32)  ============================I/O===========================   I&O's Detail    05 Mar 2022 07:01  -  06 Mar 2022 07:00  --------------------------------------------------------  IN:    Albumin 5%  - 250 mL: 500 mL    Enteral Tube Flush: 100 mL    IV PiggyBack: 50 mL    IV PiggyBack: 125 mL    Jevity 1.2: 1320 mL    Lidocaine: 30 mL    PRBCs (Packed Red Blood Cells): 350 mL    Vasopressin: 4.8 mL    Vasopressin: 50.4 mL  Total IN: 2530.2 mL    OUT:    Drain (mL): 10 mL    Voided (mL): 1095 mL  Total OUT: 1105 mL    Total NET: 1425.2 mL      06 Mar 2022 07:01  -  06 Mar 2022 20:33  --------------------------------------------------------  IN:    Jevity 1.2: 605 mL    Lactated Ringers: 950 mL  Total IN: 1555 mL    OUT:    Indwelling Catheter - Urethral (mL): 695 mL    Vasopressin: 0 mL  Total OUT: 695 mL    Total NET: 860 mL  ============================ LABS =========================                        8.2    14.50 )-----------( 153      ( 06 Mar 2022 09:42 )             26.2     03-05    134<L>  |  101  |  13  ----------------------------<  158<H>  4.1   |  25  |  1.04    Ca    8.4      05 Mar 2022 04:34  Phos  2.3     03-05  Mg     1.8     03-05    TPro  7.1  /  Alb  2.9<L>  /  TBili  0.3  /  DBili  x   /  AST  19  /  ALT  15  /  AlkPhos  128<H>  03-05    LIVER FUNCTIONS - ( 05 Mar 2022 04:34 )  Alb: 2.9 g/dL / Pro: 7.1 g/dL / ALK PHOS: 128 U/L / ALT: 15 U/L / AST: 19 U/L / GGT: x           PT/INR - ( 05 Mar 2022 04:34 )   PT: 13.3 sec;   INR: 1.15 ratio    PTT - ( 06 Mar 2022 02:30 )  PTT:27.8 sec  ABG - ( 05 Mar 2022 04:33 )  pH, Arterial: 7.42  pH, Blood: x     /  pCO2: 41    /  pO2: 108   / HCO3: 27    / Base Excess: 1.9   /  SaO2: 99.7      Blood Gas Arterial, Lactate: 1.1 mmol/L (03-05-22 @ 04:33)  Blood Gas Arterial, Lactate: 1.4 mmol/L (03-04-22 @ 02:32)  ======================Micro/Rad/Cardio=================  Telemetry: Reviewed   EKG: Reviewed  CXR: Reviewed  Culture: Reviewed   Echo: Transthoracic Echocardiogram:   Patient name: RICKY HAMPTON  YOB: 1955   Age: 66 (M)   MR#: [id number]  Study Date: 2/28/2022  Location: [facility]]onographer: Salvador Sapp [facility]  Study quality: Technically fair  Referring Physician: Jus Jensen MD  Blood Pressure: 96/81 mmHg  Height: 178 cm  Weight: 49 kg  BSA: 1.6 m2  ------------------------------------------------------------------------  PROCEDURE: Transthoracic echocardiogram with 2-D, M-Mode  and complete spectral and color flow Doppler.  INDICATION: Chronic ischemic heart disease, unspecified  (I25.9)  ------------------------------------------------------------------------  Dimensions:    Normal Values:  LA:     3.1    2.0 - 4.0 cm  Ao:     3.6    2.0 - 3.8 cm  SEPTUM: 1.1    0.6 - 1.2 cm  PWT: 1.1    0.6 - 1.1 cm  LVIDd:  4.1    3.0 - 5.6 cm  LVIDs:  3.9    1.8 - 4.0 cm  Derived variables:  LVMI: 95 g/m2  RWT: 0.53  Fractional short: 5 %  EF (Visual Estimate): 10 %  ------------------------------------------------------------------------  Observations:  Mitral Valve: Thickened mitral valve. Tethered mitral valve  leaflets with normal opening. Moderate mitral  regurgitation. Peak mitral valve gradient equals 6 mm Hg,  mean transmitral valve gradient equals 2 mm Hg, consistent  with mild mitral stenosis.  Aortic Valve/Aorta: Calcified aortic valve which remains  closed on all observed beats.  Mild continuous aortic  regurgitation.  Aortic Root: 3.6 cm.  Left Atrium: Normal left atrium.  LA volume index = 30  cc/m2.  Left Ventricle: Severe global left ventricular systolic  dysfunction.  Severe reversible diastolic dysfunction  (Stage III).  Right Heart: Normal right atrium. Normal right ventricular  size with decreased right ventricular systolic function. An  annuloplasty ring isseen in the tricuspid position.  Moderate tricuspid regurgitation.  Peak tricuspid valve  gradient equals 6 mm Hg, mean transmitral valve gradient  equals 2 mm Hg. Gradients measured at a HR of 88bpm.  Normal pulmonic valve. Mild-moderate pulmonic  regurgitation.  Pericardium/Pleura: Normal pericardium with no pericardial  effusion.  Hemodynamic: Estimated right ventricular systolic pressure  equals 25.36 - 29.36 mm Hg, assuming right atrial pressure  equals 6 - 10 mm Hg, consistent with normal pulmonary  hypertension. Color Doppler demonstrates no evidence of a  patent foramen ovale.  ------------------------------------------------------------------------  Conclusions:  1. Thickened mitral valve. Tethered mitral valve leaflets  with normal opening. Moderate mitral regurgitation. Peak  mitral valve gradient equals 6 mm Hg, mean transmitral  valve gradient equals 2 mm Hg, consistent with mild mitral  stenosis.  2. Calcified aortic valve which remains closed on all  observed beats.  Mild continuous aortic regurgitation.  3. Severe global left ventricular systolic dysfunction.  4. A Heartmate 2 LVAD at a sped of 9200RPM is present. The  aortic valve is closed.  There is mild continuious AI.  The  septum is midline. Pulsitile flow measuring 0.8m/s is  measured in to the LVAD inflow cannular  5. Normal right ventricular size with decreased right  ventricular systolic function.  6. An annuloplasty ring is seen in the tricuspid position.  Moderate tricuspid regurgitation.  Peak tricuspid valve  gradient equals 6 mm Hg, mean transmitral valve gradient  equals 2 mm Hg. Gradients measured at a HR of 88bpm.  7. Normal pulmonic valve. Mild-moderate pulmonic  regurgitation.    Transthoracic Echocardiogram:   Patient name: RICKY HAMPTON  YOB: 1955   Age: 66 (M)   MR#: [id number]  Study Date: 1/13/2022  Location: [address] [place], [place] [id number]: Karla Sellers RDCS  Study quality: Technically difficult  Referring Physician: Peace Rodriguez MD  Blood Pressure: 78/66 mmHg  Height: 183 cm  Weight: 54 kg  BSA: 1.7 m2  ------------------------------------------------------------------------  PROCEDURE: Transthoracic echocardiogram with 2-D, M-Mode  and complete spectral and color flow Doppler.  INDICATION: Acute and subacute infective endocarditis  (I33.0)  ------------------------------------------------------------------------  Dimensions:    Normal Values:  LA:     2.8    2.0 - 4.0 cm  Ao:     3.7    2.0 - 3.8 cm  SEPTUM: 0.7    0.6 - 1.2 cm  PWT:    0.9    0.6 - 1.1 cm  LVIDd:  4.6    3.0 - 5.6 cm  LVIDs:  3.2    1.8 - 4.0 cm  Derived variables:  LVMI: 69 g/m2  RWT: 0.39  EF (Visual Estimate):  %  ------------------------------------------------------------------------  Observations:  MitralValve: Normal mitral valve.  Aortic Valve/Aorta: Calcified aortic valve which remains  closed on all observed beats.  Mild, continuous aortic regurgitation.  Normal aortic root size.  Left Atrium: Normal left atrium.  Left Ventricle: Normal left ventricular internal dimensions  and wall thicknesses.  Left ventricular assist device (LVAD) noted in the apex  with laminar flow.  Right Heart: Normal right atrium.  Right ventricular enlargement with decreased right  ventricular systolic function.  An annuloplasty ring is seen in the tricuspid position.  Mild tricuspid regurgitation.  Pericardium/Pleura: Normal pericardium with no pericardial  effusion.  Hemodynamic: No evidence of pulmonary hypertension.  ------------------------------------------------------------------------  Conclusions:  Heartmate 2 at  9200/min.  Normal left ventricular internal dimensions and wall  thicknesses.  Left ventricular assist device (LVAD) noted in the apex  with laminar flow.  Calcified aortic valve which remains closed on all observed  beats. Mild, continuous aortic regurgitation.  Right ventricular enlargement with decreased right  ventricular systolic function.  An annuloplasty ring is seen in the tricuspid position.  Mild tricuspid regurgitation.    Transthoracic Echocardiogram:   Patient name: RICKY HAMPTON  YOB: 1955   Age: 66 (M)   MR#: [id number]  Study Date: 12/13/2021  Location: [address] [place], [place] [id number]: Marisa Hartley RDCS  Study quality: Technically fair  Referring Physician: Cayetano Kaminski MD  Blood Pressure: 83/56 mmHg  Height: 182 cm  Weight: 55 kg  BSA: 1.7 m2  Heart Rate: 88 mmHg  ------------------------------------------------------------------------  PROCEDURE: Transthoracic echocardiogram with 2-D, M-Mode  and complete spectral and color flow Doppler.  INDICATION: Cardiogenic shock (R57.0)  ------------------------------------------------------------------------  Dimensions:    Normal Values:  LA:     3.4    2.0 - 4.0 cm  Ao:     3.4    2.0 - 3.8 cm  SEPTUM: 0.7    0.6 - 1.2 cm  PWT:    0.9  0.6 - 1.1 cm  LVIDd:  5.5    3.0 - 5.6 cm  LVIDs:  4.5    1.8 - 4.0 cm  Derived variables:  LVMI: 92 g/m2  RWT: 0.32  Fractional short: 18 %  EF (Visual Estimate): 10 %  ------------------------------------------------------------------------  Observations:  Mitral Valve: Tethered mitral valve leaflets with normal  opening. Moderate mitral regurgitation.  Aortic Valve/Aorta: The aortic valve opens partially with  every observed beat.  Mild continuious aortic  regurgitation.  Aortic Root: 3.4 cm.  Left Atrium: Normal left atrium.  Left Ventricle: Severe global left ventricular systolic  dysfunction.  Mild diastolic dysfunction (Stage I).  Right Heart: Normal right atrium. Normal right ventricular  size with decreased right ventricular systolic function. An  annuloplasty ring is seen in the tricuspid position. Mild  tricuspid regurgitation. Peak tricuspid valve gradient  equals 5 mm Hg, mean transtricuspid valve gradient equals 2  mm Hg. Gradients measured at a HR of 85bpm.  Normal  pulmonic valve. No pulmonic regurgitation.  Pericardium/Pleura: Normal pericardium with no pericardial  effusion.  Hemodynamic: Estimated right atrial pressure is 8 mm Hg.  Estimated right ventricular systolic pressure equals 35 mm  Hg, assuming right atrial pressure equals 8 mm Hg,  consistent with borderline pulmonary hypertension.  ------------------------------------------------------------------------  Conclusions:  1. Tethered mitral valve leaflets with normal opening.  Moderate mitral regurgitation.  2. The aortic valve opens partially with every observed  beat.  Mild continuious aortic regurgitation.  3. Severe global left ventricular systolic dysfunction.  4. A Heartmate 2 LVAD is present in the left ventricle at  the speed of 9200RPM. Theaortic valve opens partially with  every observed beat. There is mild continuious aortic  regurgitation.  The LVAD cannula is visualized in the apex  with pulsitile velocities less than 1.2m/s. The septum  appears midline.  5. Normal right ventricular size with decreased right  ventricular systolic function.    Transthoracic Echocardiogram:   Patient name: RICKY HAMPTON  YOB: 1955   Age: 66 (M)   MR#: [id number]  Study Date: 10/4/2021  [id number]: SIRIA Novak  Study quality: Technically good  Referring Physician: Janusz Cadet MD  Blood Pressure: 107/77 mmHg  Height: 178 cm  Weight: 62 kg  BSA: 1.8 m2  Heart Rhythm: Normal sinus  ------------------------------------------------------------------------  PROCEDURE: Transthoracic echocardiogram with 2-D, M-Mode  and complete spectral and color flow Doppler.  INDICATION: Abnormal electrocardiogram  (R94.31 )  ------------------------------------------------------------------------  Dimensions:    Normal Values:  LA:     3.3    2.0 - 4.0 cm  Ao:     3.7    2.0 - 3.8 cm  SEPTUM: 1.1    0.6 - 1.2 cm  PWT:    1.0    0.6 - 1.1 cm  LVIDd:  4.5    3.0 - 5.6 cm  LVIDs:  4.3    1.8 - 4.0 cm  Derived variables:  LVMI: 93 g/m2  RWT: 0.44  EF (Visual Estimate):  %  ------------------------------------------------------------------------  Observations:  Mitral Valve: Normal mitral valve. Mild mitral  regurgitation.  Aortic Valve/Aorta: The aortic valve opens with every  observed beat.  Mild aortic regurgitation.  Normal aortic root size.  Left Atrium: Normal left atrium.  Left Ventricle: Normal left ventricular internal dimensions  and wall thicknesses.  Severely decreased left ventricular systolic function.  Diffuse hypokinesis.  Paradoxic septal motion due to previous surgery.  LVAD noted at the apex, with laminar flow.  Right Heart: Normal right atrium. Normal right ventricular  size. Right ventricular systolic function is difficult to  infer.  An annuloplasty ring is seen in the tricuspid position.  Pericardium/Pleura: Normal pericardium with no pericardial  effusion.  Hemodynamic: Estimated right atrial pressure is normal.  ------------------------------------------------------------------------  Conclusions:  Normal left ventricular internal dimensions and wall  thicknesses.  Severely decreased left ventricular systolic function.  Diffuse hypokinesis.  Paradoxic septal motion due to previous surgery.  LVAD noted at the apex, with laminar flow.  The aortic valve opens with every observed beat. Mild  aortic regurgitation.  An annuloplasty ring is seen in the tricuspid position.  ======================================================  PAST MEDICAL & SURGICAL HISTORY:  CHF (congestive heart failure)  CAD (coronary artery disease)  Depression  Pleural effusion  History of 2019 novel coronavirus disease (COVID-19)  Bleeding hemorrhoids  Peripheral arterial disease  Claudication  BPH with urinary obstruction  ACC/AHA stage D systolic heart failure  Anticoagulation goal of INR 2.0 to 2.5  Falls  Clavicle fracture  CKD (chronic kidney disease), stage III  Iron deficiency anemia  H/O epistaxis  Vertigo  GI bleed  S/P TVR (tricuspid valve repair)  S/P ventricular assist device  S/P endoscopy  H/O tracheostomy    ======================= DISPOSITION  =====================  [X] Critical   [ ] Guarded    [ ] Stable    [X] Maintain in CICU  [ ] Downgrade to Telemtry  [ ] Discharge Home    Patient requires continuous monitoring with bedside rhythm monitoring, pulse ox monitoring, and intermittent blood gas analysis. Care plan discussed with ICU care team. Patient remained critical and at risk for life threatening decompensation.  Patient seen, examined and plan discussed with CCU team during rounds.     I have personally provided 30 minutes of critical care time excluding time spent on separate procedures, in addition to initial critical care time provided by the CICU Attending, Dr. Jensen/ Suzy/ Anneliese/ Mick/ Jeannette/ Ted .       Leann Cronin [facility] [phone]      RIKCY HAMPTON  MRN-[id number]  Patient is a 66y old  Male who presents with a chief complaint of Septic shock (06 Mar 2022 15:18)    HPI: 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 s/p HM2 LVAD in 9/2017 at  (due to severe PAD) with TV ring, multiple GI bleeds, thus maintained off AC, CKD 3prior COVID-19 infection in 4/2020, recurrent syncope post LVAD s/p ILR, s/p prolonged complex hospitalization from 6/14-11/16/2021 initially admitted with abdominal pain complicated by GIB, respiratory failure s/p trach, multiple infections, s/p cholecystotomy tube and SMA stent 10/2021, ultimately discharged to [facility] rehab and then returned to [facility] for trach decannulation 12/2, readmitted in 2/2022 with recurrent COVID-19 infection, initially in distributive shock. Blood cultures were also positive for serratia marcescens which he has had in the past.     Pt was transferred to [facility] CICU from [facility]. He was sent to their ED from a rehab center due to AMS and tachycardia. At [facility], they noted a WBC greater than 20, tachycardia and hypercarbia on ABG. Pt was placed on BiPAP and transferred to [facility] CICU for concern for sepsis vs septic shock.     REVIEW OF SYSTEMS:  CONSTITUTIONAL: No weakness, fevers or chills  EYES/ENT: No visual changes;  No vertigo or throat pain   NECK: No pain or stiffness  RESPIRATORY: No cough, wheezing, hemoptysis; No shortness of breath  CARDIOVASCULAR: No chest pain or palpitations  GASTROINTESTINAL: No abdominal or epigastric pain. No nausea, vomiting, or hematemesis; No diarrhea or constipation. No melena or hematochezia.  GENITOURINARY: No dysuria, frequency or hematuria  NEUROLOGICAL: No numbness or weakness  SKIN: No itching, rashes    ICU Vital Signs Last 24 Hrs  T(C): 37.4 (06 Mar 2022 20:00), Max: 37.4 (06 Mar 2022 20:00)  T(F): 99.3 (06 Mar 2022 20:00), Max: 99.3 (06 Mar 2022 20:00)  HR: 92 (06 Mar 2022 20:00) (80 - 99)  ABP: 98/74 (06 Mar 2022 20:00) (74/59 - 108/82)  ABP(mean): 85 (06 Mar 2022 20:00) (66 - 94)  RR: 33 (06 Mar 2022 20:00) (22 - 43)  SpO2: 97% (06 Mar 2022 20:00) (96% - 99%)      CVP(mm Hg): -1 (03-06-22 @ 20:00) (-3 - 30)    I&O's Summary    05 Mar 2022 07:01  -  06 Mar 2022 07:00  --------------------------------------------------------  IN: 2530.2 mL / OUT: 1105 mL / NET: 1425.2 mL    06 Mar 2022 07:01  -  06 Mar 2022 20:33  --------------------------------------------------------  IN: 1555 mL / OUT: 695 mL / NET: 860 mL      CAPILLARY BLOOD GLUCOSE  POCT Blood Glucose.: 175 mg/dL (06 Mar 2022 17:32)  ============================I/O===========================   I&O's Detail    05 Mar 2022 07:01  -  06 Mar 2022 07:00  --------------------------------------------------------  IN:    Albumin 5%  - 250 mL: 500 mL    Enteral Tube Flush: 100 mL    IV PiggyBack: 50 mL    IV PiggyBack: 125 mL    Jevity 1.2: 1320 mL    Lidocaine: 30 mL    PRBCs (Packed Red Blood Cells): 350 mL    Vasopressin: 4.8 mL    Vasopressin: 50.4 mL  Total IN: 2530.2 mL    OUT:    Drain (mL): 10 mL    Voided (mL): 1095 mL  Total OUT: 1105 mL    Total NET: 1425.2 mL      06 Mar 2022 07:01  -  06 Mar 2022 20:33  --------------------------------------------------------  IN:    Jevity 1.2: 605 mL    Lactated Ringers: 950 mL  Total IN: 1555 mL    OUT:    Indwelling Catheter - Urethral (mL): 695 mL    Vasopressin: 0 mL  Total OUT: 695 mL    Total NET: 860 mL  ============================ LABS =========================                        8.2    14.50 )-----------( 153      ( 06 Mar 2022 09:42 )             26.2     03-05    134<L>  |  101  |  13  ----------------------------<  158<H>  4.1   |  25  |  1.04    Ca    8.4      05 Mar 2022 04:34  Phos  2.3     03-05  Mg     1.8     03-05    TPro  7.1  /  Alb  2.9<L>  /  TBili  0.3  /  DBili  x   /  AST  19  /  ALT  15  /  AlkPhos  128<H>  03-05    LIVER FUNCTIONS - ( 05 Mar 2022 04:34 )  Alb: 2.9 g/dL / Pro: 7.1 g/dL / ALK PHOS: 128 U/L / ALT: 15 U/L / AST: 19 U/L / GGT: x           PT/INR - ( 05 Mar 2022 04:34 )   PT: 13.3 sec;   INR: 1.15 ratio    PTT - ( 06 Mar 2022 02:30 )  PTT:27.8 sec  ABG - ( 05 Mar 2022 04:33 )  pH, Arterial: 7.42  pH, Blood: x     /  pCO2: 41    /  pO2: 108   / HCO3: 27    / Base Excess: 1.9   /  SaO2: 99.7      Blood Gas Arterial, Lactate: 1.1 mmol/L (03-05-22 @ 04:33)  Blood Gas Arterial, Lactate: 1.4 mmol/L (03-04-22 @ 02:32)  ======================Micro/Rad/Cardio=================  Telemetry: Reviewed   EKG: Reviewed  CXR: Reviewed  Culture: Reviewed   Echo: Transthoracic Echocardiogram:   Patient name: RICKY HAMPTON  YOB: 1955   Age: 66 (M)   MR#: [id number]  Study Date: 2/28/2022  [facility]  Study quality: Technically fair  Referring Physician: Jus Jensen MD  Blood Pressure: 96/81 mmHg  Height: 178 cm  Weight: 49 kg  BSA: 1.6 m2  ------------------------------------------------------------------------  PROCEDURE: Transthoracic echocardiogram with 2-D, M-Mode  and complete spectral and color flow Doppler.  INDICATION: Chronic ischemic heart disease, unspecified  (I25.9)  ------------------------------------------------------------------------  Dimensions:    Normal Values:  LA:     3.1    2.0 - 4.0 cm  Ao:     3.6    2.0 - 3.8 cm  SEPTUM: 1.1    0.6 - 1.2 cm  PWT: 1.1    0.6 - 1.1 cm  LVIDd:  4.1    3.0 - 5.6 cm  LVIDs:  3.9    1.8 - 4.0 cm  Derived variables:  LVMI: 95 g/m2  RWT: 0.53  Fractional short: 5 %  EF (Visual Estimate): 10 %  ------------------------------------------------------------------------  Observations:  Mitral Valve: Thickened mitral valve. Tethered mitral valve  leaflets with normal opening. Moderate mitral  regurgitation. Peak mitral valve gradient equals 6 mm Hg,  mean transmitral valve gradient equals 2 mm Hg, consistent  with mild mitral stenosis.  Aortic Valve/Aorta: Calcified aortic valve which remains  closed on all observed beats.  Mild continuous aortic  regurgitation.  Aortic Root: 3.6 cm.  Left Atrium: Normal left atrium.  LA volume index = 30  cc/m2.  Left Ventricle: Severe global left ventricular systolic  dysfunction.  Severe reversible diastolic dysfunction  (Stage III).  Right Heart: Normal right atrium. Normal right ventricular  size with decreased right ventricular systolic function. An  annuloplasty ring isseen in the tricuspid position.  Moderate tricuspid regurgitation.  Peak tricuspid valve  gradient equals 6 mm Hg, mean transmitral valve gradient  equals 2 mm Hg. Gradients measured at a HR of 88bpm.  Normal pulmonic valve. Mild-moderate pulmonic  regurgitation.  Pericardium/Pleura: Normal pericardium with no pericardial  effusion.  Hemodynamic: Estimated right ventricular systolic pressure  equals 25.36 - 29.36 mm Hg, assuming right atrial pressure  equals 6 - 10 mm Hg, consistent with normal pulmonary  hypertension. Color Doppler demonstrates no evidence of a  patent foramen ovale.  ------------------------------------------------------------------------  Conclusions:  1. Thickened mitral valve. Tethered mitral valve leaflets  with normal opening. Moderate mitral regurgitation. Peak  mitral valve gradient equals 6 mm Hg, mean transmitral  valve gradient equals 2 mm Hg, consistent with mild mitral  stenosis.  2. Calcified aortic valve which remains closed on all  observed beats.  Mild continuous aortic regurgitation.  3. Severe global left ventricular systolic dysfunction.  4. A Heartmate 2 LVAD at a sped of 9200RPM is present. The  aortic valve is closed.  There is mild continuious AI.  The  septum is midline. Pulsitile flow measuring 0.8m/s is  measured in to the LVAD inflow cannular  5. Normal right ventricular size with decreased right  ventricular systolic function.  6. An annuloplasty ring is seen in the tricuspid position.  Moderate tricuspid regurgitation.  Peak tricuspid valve  gradient equals 6 mm Hg, mean transmitral valve gradient  equals 2 mm Hg. Gradients measured at a HR of 88bpm.  7. Normal pulmonic valve. Mild-moderate pulmonic  regurgitation.    Transthoracic Echocardiogram:   Patient name: RICKY HAMPTON  YOB: 1955   Age: 66 (M)   MR#: [id number]  Study Date: 1/13/2022  Location: [address] [place], [place] [id number]: Karla Sellers RDCS  Study quality: Technically difficult  Referring Physician: Peace Rodriguez MD  Blood Pressure: 78/66 mmHg  Height: 183 cm  Weight: 54 kg  BSA: 1.7 m2  ------------------------------------------------------------------------  PROCEDURE: Transthoracic echocardiogram with 2-D, M-Mode  and complete spectral and color flow Doppler.  INDICATION: Acute and subacute infective endocarditis  (I33.0)  ------------------------------------------------------------------------  Dimensions:    Normal Values:  LA:     2.8    2.0 - 4.0 cm  Ao:     3.7    2.0 - 3.8 cm  SEPTUM: 0.7    0.6 - 1.2 cm  PWT:    0.9    0.6 - 1.1 cm  LVIDd:  4.6    3.0 - 5.6 cm  LVIDs:  3.2    1.8 - 4.0 cm  Derived variables:  LVMI: 69 g/m2  RWT: 0.39  EF (Visual Estimate):  %  ------------------------------------------------------------------------  Observations:  MitralValve: Normal mitral valve.  Aortic Valve/Aorta: Calcified aortic valve which remains  closed on all observed beats.  Mild, continuous aortic regurgitation.  Normal aortic root size.  Left Atrium: Normal left atrium.  Left Ventricle: Normal left ventricular internal dimensions  and wall thicknesses.  Left ventricular assist device (LVAD) noted in the apex  with laminar flow.  Right Heart: Normal right atrium.  Right ventricular enlargement with decreased right  ventricular systolic function.  An annuloplasty ring is seen in the tricuspid position.  Mild tricuspid regurgitation.  Pericardium/Pleura: Normal pericardium with no pericardial  effusion.  Hemodynamic: No evidence of pulmonary hypertension.  ------------------------------------------------------------------------  Conclusions:  Heartmate 2 at  9200/min.  Normal left ventricular internal dimensions and wall  thicknesses.  Left ventricular assist device (LVAD) noted in the apex  with laminar flow.  Calcified aortic valve which remains closed on all observed  beats. Mild, continuous aortic regurgitation.  Right ventricular enlargement with decreased right  ventricular systolic function.  An annuloplasty ring is seen in the tricuspid position.  Mild tricuspid regurgitation.    Transthoracic Echocardiogram:   Patient name: RICKY HAMPTON  YOB: 1955   Age: 66 (M)   MR#: [id number]  Study Date: 12/13/2021  Location: [address] [place], [place] [id number]: Marisa Hartley RDCS  Study quality: Technically fair  Referring Physician: Cayetano Kaminski MD  Blood Pressure: 83/56 mmHg  Height: 182 cm  Weight: 55 kg  BSA: 1.7 m2  Heart Rate: 88 mmHg  ------------------------------------------------------------------------  PROCEDURE: Transthoracic echocardiogram with 2-D, M-Mode  and complete spectral and color flow Doppler.  INDICATION: Cardiogenic shock (R57.0)  ------------------------------------------------------------------------  Dimensions:    Normal Values:  LA:     3.4    2.0 - 4.0 cm  Ao:     3.4    2.0 - 3.8 cm  SEPTUM: 0.7    0.6 - 1.2 cm  PWT:    0.9  0.6 - 1.1 cm  LVIDd:  5.5    3.0 - 5.6 cm  LVIDs:  4.5    1.8 - 4.0 cm  Derived variables:  LVMI: 92 g/m2  RWT: 0.32  Fractional short: 18 %  EF (Visual Estimate): 10 %  ------------------------------------------------------------------------  Observations:  Mitral Valve: Tethered mitral valve leaflets with normal  opening. Moderate mitral regurgitation.  Aortic Valve/Aorta: The aortic valve opens partially with  every observed beat.  Mild continuious aortic  regurgitation.  Aortic Root: 3.4 cm.  Left Atrium: Normal left atrium.  Left Ventricle: Severe global left ventricular systolic  dysfunction.  Mild diastolic dysfunction (Stage I).  Right Heart: Normal right atrium. Normal right ventricular  size with decreased right ventricular systolic function. An  annuloplasty ring is seen in the tricuspid position. Mild  tricuspid regurgitation. Peak tricuspid valve gradient  equals 5 mm Hg, mean transtricuspid valve gradient equals 2  mm Hg. Gradients measured at a HR of 85bpm.  Normal  pulmonic valve. No pulmonic regurgitation.  Pericardium/Pleura: Normal pericardium with no pericardial  effusion.  Hemodynamic: Estimated right atrial pressure is 8 mm Hg.  Estimated right ventricular systolic pressure equals 35 mm  Hg, assuming right atrial pressure equals 8 mm Hg,  consistent with borderline pulmonary hypertension.  ------------------------------------------------------------------------  Conclusions:  1. Tethered mitral valve leaflets with normal opening.  Moderate mitral regurgitation.  2. The aortic valve opens partially with every observed  beat.  Mild continuious aortic regurgitation.  3. Severe global left ventricular systolic dysfunction.  4. A Heartmate 2 LVAD is present in the left ventricle at  the speed of 9200RPM. Theaortic valve opens partially with  every observed beat. There is mild continuious aortic  regurgitation.  The LVAD cannula is visualized in the apex  with pulsitile velocities less than 1.2m/s. The septum  appears midline.  5. Normal right ventricular size with decreased right  ventricular systolic function.    Transthoracic Echocardiogram:   Patient name: RICKY HAMPTON  YOB: 1955   Age: 66 (M)   MR#: [id number]  Study Date: 10/4/2021  Location: [place] [id number]: SIRIA Novak  Study quality: Technically good  Referring Physician: Janusz Cadet MD  Blood Pressure: 107/77 mmHg  Height: 178 cm  Weight: 62 kg  BSA: 1.8 m2  Heart Rhythm: Normal sinus  ------------------------------------------------------------------------  PROCEDURE: Transthoracic echocardiogram with 2-D, M-Mode  and complete spectral and color flow Doppler.  INDICATION: Abnormal electrocardiogram  (R94.31 )  ------------------------------------------------------------------------  Dimensions:    Normal Values:  LA:     3.3    2.0 - 4.0 cm  Ao:     3.7    2.0 - 3.8 cm  SEPTUM: 1.1    0.6 - 1.2 cm  PWT:    1.0    0.6 - 1.1 cm  LVIDd:  4.5    3.0 - 5.6 cm  LVIDs:  4.3    1.8 - 4.0 cm  Derived variables:  LVMI: 93 g/m2  RWT: 0.44  EF (Visual Estimate):  %  ------------------------------------------------------------------------  Observations:  Mitral Valve: Normal mitral valve. Mild mitral  regurgitation.  Aortic Valve/Aorta: The aortic valve opens with every  observed beat.  Mild aortic regurgitation.  Normal aortic root size.  Left Atrium: Normal left atrium.  Left Ventricle: Normal left ventricular internal dimensions  and wall thicknesses.  Severely decreased left ventricular systolic function.  Diffuse hypokinesis.  Paradoxic septal motion due to previous surgery.  LVAD noted at the apex, with laminar flow.  Right Heart: Normal right atrium. Normal right ventricular  size. Right ventricular systolic function is difficult to  infer.  An annuloplasty ring is seen in the tricuspid position.  Pericardium/Pleura: Normal pericardium with no pericardial  effusion.  Hemodynamic: Estimated right atrial pressure is normal.  ------------------------------------------------------------------------  Conclusions:  Normal left ventricular internal dimensions and wall  thicknesses.  Severely decreased left ventricular systolic function.  Diffuse hypokinesis.  Paradoxic septal motion due to previous surgery.  LVAD noted at the apex, with laminar flow.  The aortic valve opens with every observed beat. Mild  aortic regurgitation.  An annuloplasty ring is seen in the tricuspid position.  ======================================================  PAST MEDICAL & SURGICAL HISTORY:  CHF (congestive heart failure)  CAD (coronary artery disease)  Depression  Pleural effusion  History of 2019 novel coronavirus disease (COVID-19)  Bleeding hemorrhoids  Peripheral arterial disease  Claudication  BPH with urinary obstruction  ACC/AHA stage D systolic heart failure  Anticoagulation goal of INR 2.0 to 2.5  Falls  Clavicle fracture  CKD (chronic kidney disease), stage III  Iron deficiency anemia  H/O epistaxis  Vertigo  GI bleed  S/P TVR (tricuspid valve repair)  S/P ventricular assist device  S/P endoscopy  H/O tracheostomy   RICKY HAMPTON  MRN-[id number]  Patient is a 66y old  Male who presents with a chief complaint of Septic shock (06 Mar 2022 15:18)    HPI: 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 s/p HM2 LVAD in 9/2017 at  (due to severe PAD) with TV ring, multiple GI bleeds, thus maintained off AC, CKD 3prior COVID-19 infection in 4/2020, recurrent syncope post LVAD s/p ILR, s/p prolonged complex hospitalization from 6/14-11/16/2021 initially admitted with abdominal pain complicated by GIB, respiratory failure s/p trach, multiple infections, s/p cholecystotomy tube and SMA stent 10/2021, ultimately discharged to [facility] rehab and then returned to [facility] for trach decannulation 12/2, readmitted in 2/2022 with recurrent COVID-19 infection, initially in distributive shock. Blood cultures were also positive for serratia marcescens which he has had in the past.     Pt was transferred to [facility] CICU from [facility]. He was sent to their ED from a rehab center due to AMS and tachycardia. At [facility], they noted a WBC greater than 20, tachycardia and hypercarbia on ABG. Pt was placed on BiPAP and transferred to [facility] CICU for concern for sepsis vs septic shock.     REVIEW OF SYSTEMS:  CONSTITUTIONAL: No weakness, fevers or chills  EYES/ENT: No visual changes;  No vertigo or throat pain   NECK: No pain or stiffness  RESPIRATORY: No cough, wheezing, hemoptysis; No shortness of breath  CARDIOVASCULAR: No chest pain or palpitations  GASTROINTESTINAL: No abdominal or epigastric pain. No nausea, vomiting, or hematemesis; No diarrhea or constipation. No melena or hematochezia.  GENITOURINARY: No dysuria, frequency or hematuria  NEUROLOGICAL: No numbness or weakness  SKIN: No itching, rashes    ICU Vital Signs Last 24 Hrs  T(C): 37.4 (06 Mar 2022 20:00), Max: 37.4 (06 Mar 2022 20:00)  T(F): 99.3 (06 Mar 2022 20:00), Max: 99.3 (06 Mar 2022 20:00)  HR: 92 (06 Mar 2022 20:00) (80 - 99)  ABP: 98/74 (06 Mar 2022 20:00) (74/59 - 108/82)  ABP(mean): 85 (06 Mar 2022 20:00) (66 - 94)  RR: 33 (06 Mar 2022 20:00) (22 - 43)  SpO2: 97% (06 Mar 2022 20:00) (96% - 99%)      CVP(mm Hg): -1 (03-06-22 @ 20:00) (-3 - 30)    I&O's Summary    05 Mar 2022 07:01  -  06 Mar 2022 07:00  --------------------------------------------------------  IN: 2530.2 mL / OUT: 1105 mL / NET: 1425.2 mL    06 Mar 2022 07:01  -  06 Mar 2022 20:33  --------------------------------------------------------  IN: 1555 mL / OUT: 695 mL / NET: 860 mL      CAPILLARY BLOOD GLUCOSE  POCT Blood Glucose.: 175 mg/dL (06 Mar 2022 17:32)    Physical Exam  General: WN/WD NAD  Neurology: Somnolent, not responding to painful stimuli or following commands  Respiratory: CTA B/L, no wheezing   CV: LVAD hum. No palpable pulses  Abdominal: Soft, NTND, LVAD driveline dsg c/d/i. perc choley drain intact +bilious output  Extremities: No edema, + peripheral pulses  ============================I/O===========================   I&O's Detail    05 Mar 2022 07:01  -  06 Mar 2022 07:00  --------------------------------------------------------  IN:    Albumin 5%  - 250 mL: 500 mL    Enteral Tube Flush: 100 mL    IV PiggyBack: 50 mL    IV PiggyBack: 125 mL    Jevity 1.2: 1320 mL    Lidocaine: 30 mL    PRBCs (Packed Red Blood Cells): 350 mL    Vasopressin: 4.8 mL    Vasopressin: 50.4 mL  Total IN: 2530.2 mL    OUT:    Drain (mL): 10 mL    Voided (mL): 1095 mL  Total OUT: 1105 mL    Total NET: 1425.2 mL      06 Mar 2022 07:01  -  06 Mar 2022 20:33  --------------------------------------------------------  IN:    Jevity 1.2: 605 mL    Lactated Ringers: 950 mL  Total IN: 1555 mL    OUT:    Indwelling Catheter - Urethral (mL): 695 mL    Vasopressin: 0 mL  Total OUT: 695 mL    Total NET: 860 mL  ============================ LABS =========================                        8.2    14.50 )-----------( 153      ( 06 Mar 2022 09:42 )             26.2     03-05    134<L>  |  101  |  13  ----------------------------<  158<H>  4.1   |  25  |  1.04    Ca    8.4      05 Mar 2022 04:34  Phos  2.3     03-05  Mg     1.8     03-05    TPro  7.1  /  Alb  2.9<L>  /  TBili  0.3  /  DBili  x   /  AST  19  /  ALT  15  /  AlkPhos  128<H>  03-05    LIVER FUNCTIONS - ( 05 Mar 2022 04:34 )  Alb: 2.9 g/dL / Pro: 7.1 g/dL / ALK PHOS: 128 U/L / ALT: 15 U/L / AST: 19 U/L / GGT: x           PT/INR - ( 05 Mar 2022 04:34 )   PT: 13.3 sec;   INR: 1.15 ratio    PTT - ( 06 Mar 2022 02:30 )  PTT:27.8 sec  ABG - ( 05 Mar 2022 04:33 )  pH, Arterial: 7.42  pH, Blood: x     /  pCO2: 41    /  pO2: 108   / HCO3: 27    / Base Excess: 1.9   /  SaO2: 99.7      Blood Gas Arterial, Lactate: 1.1 mmol/L (03-05-22 @ 04:33)  Blood Gas Arterial, Lactate: 1.4 mmol/L (03-04-22 @ 02:32)  ======================Micro/Rad/Cardio=================  Telemetry: Reviewed   EKG: Reviewed  CXR: Reviewed  Culture: Reviewed   Echo: Transthoracic Echocardiogram:   Patient name: RICKY HAMPTON  YOB: 1955   Age: 66 (M)   MR#: [id number]  Study Date: 2/28/2022  [facility]  Study quality: Technically fair  Referring Physician: Jus Jensen MD  Blood Pressure: 96/81 mmHg  Height: 178 cm  Weight: 49 kg  BSA: 1.6 m2  ------------------------------------------------------------------------  PROCEDURE: Transthoracic echocardiogram with 2-D, M-Mode  and complete spectral and color flow Doppler.  INDICATION: Chronic ischemic heart disease, unspecified  (I25.9)  ------------------------------------------------------------------------  Dimensions:    Normal Values:  LA:     3.1    2.0 - 4.0 cm  Ao:     3.6    2.0 - 3.8 cm  SEPTUM: 1.1    0.6 - 1.2 cm  PWT: 1.1    0.6 - 1.1 cm  LVIDd:  4.1    3.0 - 5.6 cm  LVIDs:  3.9    1.8 - 4.0 cm  Derived variables:  LVMI: 95 g/m2  RWT: 0.53  Fractional short: 5 %  EF (Visual Estimate): 10 %  ------------------------------------------------------------------------  Observations:  Mitral Valve: Thickened mitral valve. Tethered mitral valve  leaflets with normal opening. Moderate mitral  regurgitation. Peak mitral valve gradient equals 6 mm Hg,  mean transmitral valve gradient equals 2 mm Hg, consistent  with mild mitral stenosis.  Aortic Valve/Aorta: Calcified aortic valve which remains  closed on all observed beats.  Mild continuous aortic  regurgitation.  Aortic Root: 3.6 cm.  Left Atrium: Normal left atrium.  LA volume index = 30  cc/m2.  Left Ventricle: Severe global left ventricular systolic  dysfunction.  Severe reversible diastolic dysfunction  (Stage III).  Right Heart: Normal right atrium. Normal right ventricular  size with decreased right ventricular systolic function. An  annuloplasty ring isseen in the tricuspid position.  Moderate tricuspid regurgitation.  Peak tricuspid valve  gradient equals 6 mm Hg, mean transmitral valve gradient  equals 2 mm Hg. Gradients measured at a HR of 88bpm.  Normal pulmonic valve. Mild-moderate pulmonic  regurgitation.  Pericardium/Pleura: Normal pericardium with no pericardial  effusion.  Hemodynamic: Estimated right ventricular systolic pressure  equals 25.36 - 29.36 mm Hg, assuming right atrial pressure  equals 6 - 10 mm Hg, consistent with normal pulmonary  hypertension. Color Doppler demonstrates no evidence of a  patent foramen ovale.  ------------------------------------------------------------------------  Conclusions:  1. Thickened mitral valve. Tethered mitral valve leaflets  with normal opening. Moderate mitral regurgitation. Peak  mitral valve gradient equals 6 mm Hg, mean transmitral  valve gradient equals 2 mm Hg, consistent with mild mitral  stenosis.  2. Calcified aortic valve which remains closed on all  observed beats.  Mild continuous aortic regurgitation.  3. Severe global left ventricular systolic dysfunction.  4. A Heartmate 2 LVAD at a sped of 9200RPM is present. The  aortic valve is closed.  There is mild continuious AI.  The  septum is midline. Pulsitile flow measuring 0.8m/s is  measured in to the LVAD inflow cannular  5. Normal right ventricular size with decreased right  ventricular systolic function.  6. An annuloplasty ring is seen in the tricuspid position.  Moderate tricuspid regurgitation.  Peak tricuspid valve  gradient equals 6 mm Hg, mean transmitral valve gradient  equals 2 mm Hg. Gradients measured at a HR of 88bpm.  7. Normal pulmonic valve. Mild-moderate pulmonic  regurgitation.    Transthoracic Echocardiogram:   Patient name: RICKY HAMPTON  YOB: 1955   Age: 66 (M)   MR#: [id number]  Study Date: 1/13/2022  [id number] Karla Sellers CANDY  Study quality: Technically difficult  Referring Physician: Peace Rodriguez MD  Blood Pressure: 78/66 mmHg  Height: 183 cm  Weight: 54 kg  BSA: 1.7 m2  ------------------------------------------------------------------------  PROCEDURE: Transthoracic echocardiogram with 2-D, M-Mode  and complete spectral and color flow Doppler.  INDICATION: Acute and subacute infective endocarditis  (I33.0)  ------------------------------------------------------------------------  Dimensions:    Normal Values:  LA:     2.8    2.0 - 4.0 cm  Ao:     3.7    2.0 - 3.8 cm  SEPTUM: 0.7    0.6 - 1.2 cm  PWT:    0.9    0.6 - 1.1 cm  LVIDd:  4.6    3.0 - 5.6 cm  LVIDs:  3.2    1.8 - 4.0 cm  Derived variables:  LVMI: 69 g/m2  RWT: 0.39  EF (Visual Estimate):  %  ------------------------------------------------------------------------  Observations:  MitralValve: Normal mitral valve.  Aortic Valve/Aorta: Calcified aortic valve which remains  closed on all observed beats.  Mild, continuous aortic regurgitation.  Normal aortic root size.  Left Atrium: Normal left atrium.  Left Ventricle: Normal left ventricular internal dimensions  and wall thicknesses.  Left ventricular assist device (LVAD) noted in the apex  with laminar flow.  Right Heart: Normal right atrium.  Right ventricular enlargement with decreased right  ventricular systolic function.  An annuloplasty ring is seen in the tricuspid position.  Mild tricuspid regurgitation.  Pericardium/Pleura: Normal pericardium with no pericardial  effusion.  Hemodynamic: No evidence of pulmonary hypertension.  ------------------------------------------------------------------------  Conclusions:  Heartmate 2 at  9200/min.  Normal left ventricular internal dimensions and wall  thicknesses.  Left ventricular assist device (LVAD) noted in the apex  with laminar flow.  Calcified aortic valve which remains closed on all observed  beats. Mild, continuous aortic regurgitation.  Right ventricular enlargement with decreased right  ventricular systolic function.  An annuloplasty ring is seen in the tricuspid position.  Mild tricuspid regurgitation.    Transthoracic Echocardiogram:   Patient name: RICKY HAMPTON  YOB: 1955   Age: 66 (M)   MR#: [id number]  Study Date: 12/13/2021  Location: [address] [place], [place] [id number]: Marisa Hartley RDCS  Study quality: Technically fair  Referring Physician: Cayetano Kaminski MD  Blood Pressure: 83/56 mmHg  Height: 182 cm  Weight: 55 kg  BSA: 1.7 m2  Heart Rate: 88 mmHg  ------------------------------------------------------------------------  PROCEDURE: Transthoracic echocardiogram with 2-D, M-Mode  and complete spectral and color flow Doppler.  INDICATION: Cardiogenic shock (R57.0)  ------------------------------------------------------------------------  Dimensions:    Normal Values:  LA:     3.4    2.0 - 4.0 cm  Ao:     3.4    2.0 - 3.8 cm  SEPTUM: 0.7    0.6 - 1.2 cm  PWT:    0.9  0.6 - 1.1 cm  LVIDd:  5.5    3.0 - 5.6 cm  LVIDs:  4.5    1.8 - 4.0 cm  Derived variables:  LVMI: 92 g/m2  RWT: 0.32  Fractional short: 18 %  EF (Visual Estimate): 10 %  ------------------------------------------------------------------------  Observations:  Mitral Valve: Tethered mitral valve leaflets with normal  opening. Moderate mitral regurgitation.  Aortic Valve/Aorta: The aortic valve opens partially with  every observed beat.  Mild continuious aortic  regurgitation.  Aortic Root: 3.4 cm.  Left Atrium: Normal left atrium.  Left Ventricle: Severe global left ventricular systolic  dysfunction.  Mild diastolic dysfunction (Stage I).  Right Heart: Normal right atrium. Normal right ventricular  size with decreased right ventricular systolic function. An  annuloplasty ring is seen in the tricuspid position. Mild  tricuspid regurgitation. Peak tricuspid valve gradient  equals 5 mm Hg, mean transtricuspid valve gradient equals 2  mm Hg. Gradients measured at a HR of 85bpm.  Normal  pulmonic valve. No pulmonic regurgitation.  Pericardium/Pleura: Normal pericardium with no pericardial  effusion.  Hemodynamic: Estimated right atrial pressure is 8 mm Hg.  Estimated right ventricular systolic pressure equals 35 mm  Hg, assuming right atrial pressure equals 8 mm Hg,  consistent with borderline pulmonary hypertension.  ------------------------------------------------------------------------  Conclusions:  1. Tethered mitral valve leaflets with normal opening.  Moderate mitral regurgitation.  2. The aortic valve opens partially with every observed  beat.  Mild continuious aortic regurgitation.  3. Severe global left ventricular systolic dysfunction.  4. A Heartmate 2 LVAD is present in the left ventricle at  the speed of 9200RPM. Theaortic valve opens partially with  every observed beat. There is mild continuious aortic  regurgitation.  The LVAD cannula is visualized in the apex  with pulsitile velocities less than 1.2m/s. The septum  appears midline.  5. Normal right ventricular size with decreased right  ventricular systolic function.    Transthoracic Echocardiogram:   Patient name: RICKY HAMPTON  YOB: 1955   Age: 66 (M)   MR#: [id number]  Study Date: 10/4/2021  Location: [place] [id number]: SIRIA Novak  Study quality: Technically good  Referring Physician: Janusz Cadet MD  Blood Pressure: 107/77 mmHg  Height: 178 cm  Weight: 62 kg  BSA: 1.8 m2  Heart Rhythm: Normal sinus  ------------------------------------------------------------------------  PROCEDURE: Transthoracic echocardiogram with 2-D, M-Mode  and complete spectral and color flow Doppler.  INDICATION: Abnormal electrocardiogram  (R94.31 )  ------------------------------------------------------------------------  Dimensions:    Normal Values:  LA:     3.3    2.0 - 4.0 cm  Ao:     3.7    2.0 - 3.8 cm  SEPTUM: 1.1    0.6 - 1.2 cm  PWT:    1.0    0.6 - 1.1 cm  LVIDd:  4.5    3.0 - 5.6 cm  LVIDs:  4.3    1.8 - 4.0 cm  Derived variables:  LVMI: 93 g/m2  RWT: 0.44  EF (Visual Estimate):  %  ------------------------------------------------------------------------  Observations:  Mitral Valve: Normal mitral valve. Mild mitral  regurgitation.  Aortic Valve/Aorta: The aortic valve opens with every  observed beat.  Mild aortic regurgitation.  Normal aortic root size.  Left Atrium: Normal left atrium.  Left Ventricle: Normal left ventricular internal dimensions  and wall thicknesses.  Severely decreased left ventricular systolic function.  Diffuse hypokinesis.  Paradoxic septal motion due to previous surgery.  LVAD noted at the apex, with laminar flow.  Right Heart: Normal right atrium. Normal right ventricular  size. Right ventricular systolic function is difficult to  infer.  An annuloplasty ring is seen in the tricuspid position.  Pericardium/Pleura: Normal pericardium with no pericardial  effusion.  Hemodynamic: Estimated right atrial pressure is normal.  ------------------------------------------------------------------------  Conclusions:  Normal left ventricular internal dimensions and wall  thicknesses.  Severely decreased left ventricular systolic function.  Diffuse hypokinesis.  Paradoxic septal motion due to previous surgery.  LVAD noted at the apex, with laminar flow.  The aortic valve opens with every observed beat. Mild  aortic regurgitation.  An annuloplasty ring is seen in the tricuspid position.  ======================================================  PAST MEDICAL & SURGICAL HISTORY:  CHF (congestive heart failure)  CAD (coronary artery disease)  Depression  Pleural effusion  History of 2019 novel coronavirus disease (COVID-19)  Bleeding hemorrhoids  Peripheral arterial disease  Claudication  BPH with urinary obstruction  ACC/AHA stage D systolic heart failure  Anticoagulation goal of INR 2.0 to 2.5  Falls  Clavicle fracture  CKD (chronic kidney disease), stage III  Iron deficiency anemia  H/O epistaxis  Vertigo  GI bleed  S/P TVR (tricuspid valve repair)  S/P ventricular assist device  S/P endoscopy  H/O tracheostomy   RICKY HAMPTON  MRN-[id number]  Patient is a 66y old  Male who presents with a chief complaint of Septic shock (06 Mar 2022 15:18)    HPI: 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 s/p HM2 LVAD in 9/2017 at  (due to severe PAD) with TV ring, multiple GI bleeds, thus maintained off AC, CKD 3prior COVID-19 infection in 4/2020, recurrent syncope post LVAD s/p ILR, s/p prolonged complex hospitalization from 6/14-11/16/2021 initially admitted with abdominal pain complicated by GIB, respiratory failure s/p trach, multiple infections, s/p cholecystotomy tube and SMA stent 10/2021, ultimately discharged to [facility] rehab and then returned to [facility] for trach decannulation 12/2, readmitted in 2/2022 with recurrent COVID-19 infection, initially in distributive shock. Blood cultures were also positive for serratia marcescens which he has had in the past.     Pt was transferred to [facility] CICU from [facility]. He was sent to their ED from a rehab center due to AMS and tachycardia. At [facility], they noted a WBC greater than 20, tachycardia and hypercarbia on ABG. Pt was placed on BiPAP and transferred to [facility] CICU for concern for sepsis vs septic shock.     REVIEW OF SYSTEMS:  CONSTITUTIONAL: No weakness, fevers or chills  EYES/ENT: No visual changes;  No vertigo or throat pain   NECK: No pain or stiffness  RESPIRATORY: No cough, wheezing, hemoptysis; No shortness of breath  CARDIOVASCULAR: No chest pain or palpitations  GASTROINTESTINAL: No abdominal or epigastric pain. No nausea, vomiting, or hematemesis; No diarrhea or constipation. No melena or hematochezia.  GENITOURINARY: No dysuria, frequency or hematuria  NEUROLOGICAL: No numbness or weakness  SKIN: No itching, rashes    ICU Vital Signs Last 24 Hrs  T(C): 37.4 (06 Mar 2022 20:00), Max: 37.4 (06 Mar 2022 20:00)  T(F): 99.3 (06 Mar 2022 20:00), Max: 99.3 (06 Mar 2022 20:00)  HR: 92 (06 Mar 2022 20:00) (80 - 99)  ABP: 98/74 (06 Mar 2022 20:00) (74/59 - 108/82)  ABP(mean): 85 (06 Mar 2022 20:00) (66 - 94)  RR: 33 (06 Mar 2022 20:00) (22 - 43)  SpO2: 97% (06 Mar 2022 20:00) (96% - 99%)      CVP(mm Hg): -1 (03-06-22 @ 20:00) (-3 - 30)    I&O's Summary    05 Mar 2022 07:01  -  06 Mar 2022 07:00  --------------------------------------------------------  IN: 2530.2 mL / OUT: 1105 mL / NET: 1425.2 mL    06 Mar 2022 07:01  -  06 Mar 2022 20:33  --------------------------------------------------------  IN: 1555 mL / OUT: 695 mL / NET: 860 mL      CAPILLARY BLOOD GLUCOSE  POCT Blood Glucose.: 175 mg/dL (06 Mar 2022 17:32)    Physical Exam  General: WN/WD NAD  Neurology: Somnolent, not responding to painful stimuli or following commands  Respiratory: CTA B/L, no wheezing   CV: LVAD hum. No palpable pulses  Abdominal: Soft, NTND, LVAD driveline dsg c/d/i. perc choley drain intact +bilious output  Extremities: No edema, warm to touch  ============================I/O===========================   I&O's Detail    05 Mar 2022 07:01  -  06 Mar 2022 07:00  --------------------------------------------------------  IN:    Albumin 5%  - 250 mL: 500 mL    Enteral Tube Flush: 100 mL    IV PiggyBack: 50 mL    IV PiggyBack: 125 mL    Jevity 1.2: 1320 mL    Lidocaine: 30 mL    PRBCs (Packed Red Blood Cells): 350 mL    Vasopressin: 4.8 mL    Vasopressin: 50.4 mL  Total IN: 2530.2 mL    OUT:    Drain (mL): 10 mL    Voided (mL): 1095 mL  Total OUT: 1105 mL    Total NET: 1425.2 mL      06 Mar 2022 07:01  -  06 Mar 2022 20:33  --------------------------------------------------------  IN:    Jevity 1.2: 605 mL    Lactated Ringers: 950 mL  Total IN: 1555 mL    OUT:    Indwelling Catheter - Urethral (mL): 695 mL    Vasopressin: 0 mL  Total OUT: 695 mL    Total NET: 860 mL  ============================ LABS =========================                        8.2    14.50 )-----------( 153      ( 06 Mar 2022 09:42 )             26.2     03-05    134<L>  |  101  |  13  ----------------------------<  158<H>  4.1   |  25  |  1.04    Ca    8.4      05 Mar 2022 04:34  Phos  2.3     03-05  Mg     1.8     03-05    TPro  7.1  /  Alb  2.9<L>  /  TBili  0.3  /  DBili  x   /  AST  19  /  ALT  15  /  AlkPhos  128<H>  03-05    LIVER FUNCTIONS - ( 05 Mar 2022 04:34 )  Alb: 2.9 g/dL / Pro: 7.1 g/dL / ALK PHOS: 128 U/L / ALT: 15 U/L / AST: 19 U/L / GGT: x           PT/INR - ( 05 Mar 2022 04:34 )   PT: 13.3 sec;   INR: 1.15 ratio    PTT - ( 06 Mar 2022 02:30 )  PTT:27.8 sec  ABG - ( 05 Mar 2022 04:33 )  pH, Arterial: 7.42  pH, Blood: x     /  pCO2: 41    /  pO2: 108   / HCO3: 27    / Base Excess: 1.9   /  SaO2: 99.7      Blood Gas Arterial, Lactate: 1.1 mmol/L (03-05-22 @ 04:33)  Blood Gas Arterial, Lactate: 1.4 mmol/L (03-04-22 @ 02:32)  ======================Micro/Rad/Cardio=================  Telemetry: Reviewed   EKG: Reviewed  CXR: Reviewed  Culture: Reviewed   Echo: Transthoracic Echocardiogram:   Patient name: RICKY HAMPTON  YOB: 1955   Age: 66 (M)   MR#: [id number]  Study Date: 2/28/2022  [facility]  Study quality: Technically fair  Referring Physician: Jus Jensen MD  Blood Pressure: 96/81 mmHg  Height: 178 cm  Weight: 49 kg  BSA: 1.6 m2  ------------------------------------------------------------------------  PROCEDURE: Transthoracic echocardiogram with 2-D, M-Mode  and complete spectral and color flow Doppler.  INDICATION: Chronic ischemic heart disease, unspecified  (I25.9)  ------------------------------------------------------------------------  Dimensions:    Normal Values:  LA:     3.1    2.0 - 4.0 cm  Ao:     3.6    2.0 - 3.8 cm  SEPTUM: 1.1    0.6 - 1.2 cm  PWT: 1.1    0.6 - 1.1 cm  LVIDd:  4.1    3.0 - 5.6 cm  LVIDs:  3.9    1.8 - 4.0 cm  Derived variables:  LVMI: 95 g/m2  RWT: 0.53  Fractional short: 5 %  EF (Visual Estimate): 10 %  ------------------------------------------------------------------------  Observations:  Mitral Valve: Thickened mitral valve. Tethered mitral valve  leaflets with normal opening. Moderate mitral  regurgitation. Peak mitral valve gradient equals 6 mm Hg,  mean transmitral valve gradient equals 2 mm Hg, consistent  with mild mitral stenosis.  Aortic Valve/Aorta: Calcified aortic valve which remains  closed on all observed beats.  Mild continuous aortic  regurgitation.  Aortic Root: 3.6 cm.  Left Atrium: Normal left atrium.  LA volume index = 30  cc/m2.  Left Ventricle: Severe global left ventricular systolic  dysfunction.  Severe reversible diastolic dysfunction  (Stage III).  Right Heart: Normal right atrium. Normal right ventricular  size with decreased right ventricular systolic function. An  annuloplasty ring isseen in the tricuspid position.  Moderate tricuspid regurgitation.  Peak tricuspid valve  gradient equals 6 mm Hg, mean transmitral valve gradient  equals 2 mm Hg. Gradients measured at a HR of 88bpm.  Normal pulmonic valve. Mild-moderate pulmonic  regurgitation.  Pericardium/Pleura: Normal pericardium with no pericardial  effusion.  Hemodynamic: Estimated right ventricular systolic pressure  equals 25.36 - 29.36 mm Hg, assuming right atrial pressure  equals 6 - 10 mm Hg, consistent with normal pulmonary  hypertension. Color Doppler demonstrates no evidence of a  patent foramen ovale.  ------------------------------------------------------------------------  Conclusions:  1. Thickened mitral valve. Tethered mitral valve leaflets  with normal opening. Moderate mitral regurgitation. Peak  mitral valve gradient equals 6 mm Hg, mean transmitral  valve gradient equals 2 mm Hg, consistent with mild mitral  stenosis.  2. Calcified aortic valve which remains closed on all  observed beats.  Mild continuous aortic regurgitation.  3. Severe global left ventricular systolic dysfunction.  4. A Heartmate 2 LVAD at a sped of 9200RPM is present. The  aortic valve is closed.  There is mild continuious AI.  The  septum is midline. Pulsitile flow measuring 0.8m/s is  measured in to the LVAD inflow cannular  5. Normal right ventricular size with decreased right  ventricular systolic function.  6. An annuloplasty ring is seen in the tricuspid position.  Moderate tricuspid regurgitation.  Peak tricuspid valve  gradient equals 6 mm Hg, mean transmitral valve gradient  equals 2 mm Hg. Gradients measured at a HR of 88bpm.  7. Normal pulmonic valve. Mild-moderate pulmonic  regurgitation.    Transthoracic Echocardiogram:   Patient name: RICKY HAMPTON  YOB: 1955   Age: 66 (M)   MR#: [id number]  Study Date: 1/13/2022  [id number][facility] Karla Sellers CANDY  Study quality: Technically difficult  Referring Physician: Peace Rodriguez MD  Blood Pressure: 78/66 mmHg  Height: 183 cm  Weight: 54 kg  BSA: 1.7 m2  ------------------------------------------------------------------------  PROCEDURE: Transthoracic echocardiogram with 2-D, M-Mode  and complete spectral and color flow Doppler.  INDICATION: Acute and subacute infective endocarditis  (I33.0)  ------------------------------------------------------------------------  Dimensions:    Normal Values:  LA:     2.8    2.0 - 4.0 cm  Ao:     3.7    2.0 - 3.8 cm  SEPTUM: 0.7    0.6 - 1.2 cm  PWT:    0.9    0.6 - 1.1 cm  LVIDd:  4.6    3.0 - 5.6 cm  LVIDs:  3.2    1.8 - 4.0 cm  Derived variables:  LVMI: 69 g/m2  RWT: 0.39  EF (Visual Estimate):  %  ------------------------------------------------------------------------  Observations:  MitralValve: Normal mitral valve.  Aortic Valve/Aorta: Calcified aortic valve which remains  closed on all observed beats.  Mild, continuous aortic regurgitation.  Normal aortic root size.  Left Atrium: Normal left atrium.  Left Ventricle: Normal left ventricular internal dimensions  and wall thicknesses.  Left ventricular assist device (LVAD) noted in the apex  with laminar flow.  Right Heart: Normal right atrium.  Right ventricular enlargement with decreased right  ventricular systolic function.  An annuloplasty ring is seen in the tricuspid position.  Mild tricuspid regurgitation.  Pericardium/Pleura: Normal pericardium with no pericardial  effusion.  Hemodynamic: No evidence of pulmonary hypertension.  ------------------------------------------------------------------------  Conclusions:  Heartmate 2 at  9200/min.  Normal left ventricular internal dimensions and wall  thicknesses.  Left ventricular assist device (LVAD) noted in the apex  with laminar flow.  Calcified aortic valve which remains closed on all observed  beats. Mild, continuous aortic regurgitation.  Right ventricular enlargement with decreased right  ventricular systolic function.  An annuloplasty ring is seen in the tricuspid position.  Mild tricuspid regurgitation.    Transthoracic Echocardiogram:   Patient name: RICKY HAMPTON  YOB: 1955   Age: 66 (M)   MR#: [id number]  Study Date: 12/13/2021  Location: [address] [place], [place] [id number]: Marisa Hartley RDCS  Study quality: Technically fair  Referring Physician: Cayetano Kaminski MD  Blood Pressure: 83/56 mmHg  Height: 182 cm  Weight: 55 kg  BSA: 1.7 m2  Heart Rate: 88 mmHg  ------------------------------------------------------------------------  PROCEDURE: Transthoracic echocardiogram with 2-D, M-Mode  and complete spectral and color flow Doppler.  INDICATION: Cardiogenic shock (R57.0)  ------------------------------------------------------------------------  Dimensions:    Normal Values:  LA:     3.4    2.0 - 4.0 cm  Ao:     3.4    2.0 - 3.8 cm  SEPTUM: 0.7    0.6 - 1.2 cm  PWT:    0.9  0.6 - 1.1 cm  LVIDd:  5.5    3.0 - 5.6 cm  LVIDs:  4.5    1.8 - 4.0 cm  Derived variables:  LVMI: 92 g/m2  RWT: 0.32  Fractional short: 18 %  EF (Visual Estimate): 10 %  ------------------------------------------------------------------------  Observations:  Mitral Valve: Tethered mitral valve leaflets with normal  opening. Moderate mitral regurgitation.  Aortic Valve/Aorta: The aortic valve opens partially with  every observed beat.  Mild continuious aortic  regurgitation.  Aortic Root: 3.4 cm.  Left Atrium: Normal left atrium.  Left Ventricle: Severe global left ventricular systolic  dysfunction.  Mild diastolic dysfunction (Stage I).  Right Heart: Normal right atrium. Normal right ventricular  size with decreased right ventricular systolic function. An  annuloplasty ring is seen in the tricuspid position. Mild  tricuspid regurgitation. Peak tricuspid valve gradient  equals 5 mm Hg, mean transtricuspid valve gradient equals 2  mm Hg. Gradients measured at a HR of 85bpm.  Normal  pulmonic valve. No pulmonic regurgitation.  Pericardium/Pleura: Normal pericardium with no pericardial  effusion.  Hemodynamic: Estimated right atrial pressure is 8 mm Hg.  Estimated right ventricular systolic pressure equals 35 mm  Hg, assuming right atrial pressure equals 8 mm Hg,  consistent with borderline pulmonary hypertension.  ------------------------------------------------------------------------  Conclusions:  1. Tethered mitral valve leaflets with normal opening.  Moderate mitral regurgitation.  2. The aortic valve opens partially with every observed  beat.  Mild continuious aortic regurgitation.  3. Severe global left ventricular systolic dysfunction.  4. A Heartmate 2 LVAD is present in the left ventricle at  the speed of 9200RPM. Theaortic valve opens partially with  every observed beat. There is mild continuious aortic  regurgitation.  The LVAD cannula is visualized in the apex  with pulsitile velocities less than 1.2m/s. The septum  appears midline.  5. Normal right ventricular size with decreased right  ventricular systolic function.    Transthoracic Echocardiogram:   Patient name: RICKY HAMPTON  YOB: 1955   Age: 66 (M)   MR#: [id number]  Study Date: 10/4/2021  Location: [place] [id number]: SIRIA Novak  Study quality: Technically good  Referring Physician: Janusz Cadet MD  Blood Pressure: 107/77 mmHg  Height: 178 cm  Weight: 62 kg  BSA: 1.8 m2  Heart Rhythm: Normal sinus  ------------------------------------------------------------------------  PROCEDURE: Transthoracic echocardiogram with 2-D, M-Mode  and complete spectral and color flow Doppler.  INDICATION: Abnormal electrocardiogram  (R94.31 )  ------------------------------------------------------------------------  Dimensions:    Normal Values:  LA:     3.3    2.0 - 4.0 cm  Ao:     3.7    2.0 - 3.8 cm  SEPTUM: 1.1    0.6 - 1.2 cm  PWT:    1.0    0.6 - 1.1 cm  LVIDd:  4.5    3.0 - 5.6 cm  LVIDs:  4.3    1.8 - 4.0 cm  Derived variables:  LVMI: 93 g/m2  RWT: 0.44  EF (Visual Estimate):  %  ------------------------------------------------------------------------  Observations:  Mitral Valve: Normal mitral valve. Mild mitral  regurgitation.  Aortic Valve/Aorta: The aortic valve opens with every  observed beat.  Mild aortic regurgitation.  Normal aortic root size.  Left Atrium: Normal left atrium.  Left Ventricle: Normal left ventricular internal dimensions  and wall thicknesses.  Severely decreased left ventricular systolic function.  Diffuse hypokinesis.  Paradoxic septal motion due to previous surgery.  LVAD noted at the apex, with laminar flow.  Right Heart: Normal right atrium. Normal right ventricular  size. Right ventricular systolic function is difficult to  infer.  An annuloplasty ring is seen in the tricuspid position.  Pericardium/Pleura: Normal pericardium with no pericardial  effusion.  Hemodynamic: Estimated right atrial pressure is normal.  ------------------------------------------------------------------------  Conclusions:  Normal left ventricular internal dimensions and wall  thicknesses.  Severely decreased left ventricular systolic function.  Diffuse hypokinesis.  Paradoxic septal motion due to previous surgery.  LVAD noted at the apex, with laminar flow.  The aortic valve opens with every observed beat. Mild  aortic regurgitation.  An annuloplasty ring is seen in the tricuspid position.  ======================================================  PAST MEDICAL & SURGICAL HISTORY:  CHF (congestive heart failure)  CAD (coronary artery disease)  Depression  Pleural effusion  History of 2019 novel coronavirus disease (COVID-19)  Bleeding hemorrhoids  Peripheral arterial disease  Claudication  BPH with urinary obstruction  ACC/AHA stage D systolic heart failure  Anticoagulation goal of INR 2.0 to 2.5  Falls  Clavicle fracture  CKD (chronic kidney disease), stage III  Iron deficiency anemia  H/O epistaxis  Vertigo  GI bleed  S/P TVR (tricuspid valve repair)  S/P ventricular assist device  S/P endoscopy  H/O tracheostomy

## 2022-03-07 NOTE — PROGRESS NOTE ADULT - SUBJECTIVE AND OBJECTIVE BOX
INFECTIOUS DISEASES FOLLOW UP-- Anitra Prater  [phone]    This is a follow up note for this  66yMale with  Sepsis        ROS:  CONSTITUTIONAL:  No fever, good appetite  CARDIOVASCULAR:  No chest pain or palpitations  RESPIRATORY:  No dyspnea  GASTROINTESTINAL:  No nausea, vomiting, diarrhea, or abdominal pain  GENITOURINARY:  No dysuria  NEUROLOGIC:  No headache,     Allergies    Amiodarone Hydrochloride (Other (Severe))    Intolerances        ANTIBIOTICS/RELEVANT:  antimicrobials  cefepime   IVPB 1000 milliGRAM(s) IV Intermittent every 12 hours    immunologic:    OTHER:  chlorhexidine 2% Cloths 1 Application(s) Topical <User Schedule>  cholecalciferol 2000 Unit(s) Oral daily  insulin lispro (ADMELOG) corrective regimen sliding scale   SubCutaneous every 6 hours  methimazole 10 milliGRAM(s) Oral daily  mexiletine 150 milliGRAM(s) Oral every 8 hours  midodrine 20 milliGRAM(s) Oral every 8 hours  multivitamin 1 Tablet(s) Oral daily  pantoprazole  Injectable 40 milliGRAM(s) IV Push every 12 hours  senna 2 Tablet(s) Oral at bedtime  sertraline 100 milliGRAM(s) Oral daily      Objective:  Vital Signs Last 24 Hrs  T(C): 37 (07 Mar 2022 16:00), Max: 37.4 (06 Mar 2022 20:00)  T(F): 98.6 (07 Mar 2022 16:00), Max: 99.3 (06 Mar 2022 20:00)  HR: 80 (07 Mar 2022 18:00) (72 - 92)  BP: --  BP(mean): --  RR: 20 (07 Mar 2022 18:00) (20 - 36)  SpO2: 99% (07 Mar 2022 18:00) (96% - 99%)    PHYSICAL EXAM:  Constitutional:no acute distress  Eyes:ALONSO, EOMI  Ear/Nose/Throat: no oral lesions, 	  Respiratory: clear BL  Cardiovascular: S1S2  Gastrointestinal:soft, (+) BS, no tenderness  Extremities:no e/e/c  No Lymphadenopathy  IV sites not inflammed.    LABS:                        9.3    14.57 )-----------( 195      ( 07 Mar 2022 05:00 )             29.4     03-07    135  |  103  |  14  ----------------------------<  147<H>  4.2   |  24  |  0.97    Ca    8.4      07 Mar 2022 05:00  Phos  2.4     03-07  Mg     1.8     03-07    TPro  7.4  /  Alb  2.7<L>  /  TBili  0.2  /  DBili  x   /  AST  24  /  ALT  16  /  AlkPhos  132<H>  03-07    PTT - ( 07 Mar 2022 05:01 )  PTT:26.8 sec      MICROBIOLOGY:            RECENT CULTURES:  03-06 @ 14:17  .Blood Blood-Arterial  --  --  --    No growth to date.  --  03-05 @ 15:33  .Sputum Trach Site  --  --  --    Normal Respiratory Bria present  --  03-02 @ 21:52  .Blood Blood  --  --  --    No growth to date.  --      RADIOLOGY & ADDITIONAL STUDIES:  < from: CT Abdomen and Pelvis w/ Oral Cont and w/ IV Cont (03.03.22 @ 20:54) >  IMPRESSION:  Small bilateral pleural effusions and small volume ascites, new from   prior exam. No drainable fluid collection.    A 2.6 m hypoattenuating splenic lesion is decreased.    Aortic thrombus narrowing the lumen at least 50% at the infrarenal   segment, increased from the prior exam.    < end of copied text >   INFECTIOUS DISEASES FOLLOW UP-- Anitra Prater  [phone]    This is a follow up note for this  66yMale with  Sepsis        ROS:  CONSTITUTIONAL: opens eyes, non interactive    Allergies    Amiodarone Hydrochloride (Other (Severe))    Intolerances        ANTIBIOTICS/RELEVANT:  antimicrobials  cefepime   IVPB 1000 milliGRAM(s) IV Intermittent every 12 hours    immunologic:    OTHER:  chlorhexidine 2% Cloths 1 Application(s) Topical <User Schedule>  cholecalciferol 2000 Unit(s) Oral daily  insulin lispro (ADMELOG) corrective regimen sliding scale   SubCutaneous every 6 hours  methimazole 10 milliGRAM(s) Oral daily  mexiletine 150 milliGRAM(s) Oral every 8 hours  midodrine 20 milliGRAM(s) Oral every 8 hours  multivitamin 1 Tablet(s) Oral daily  pantoprazole  Injectable 40 milliGRAM(s) IV Push every 12 hours  senna 2 Tablet(s) Oral at bedtime  sertraline 100 milliGRAM(s) Oral daily      Objective:  Vital Signs Last 24 Hrs  T(C): 37 (07 Mar 2022 16:00), Max: 37.4 (06 Mar 2022 20:00)  T(F): 98.6 (07 Mar 2022 16:00), Max: 99.3 (06 Mar 2022 20:00)  HR: 80 (07 Mar 2022 18:00) (72 - 92)  BP: --  BP(mean): --  RR: 20 (07 Mar 2022 18:00) (20 - 36)  SpO2: 99% (07 Mar 2022 18:00) (96% - 99%)    PHYSICAL EXAM:  Constitutional:no acute distress, off oxygen  Eyes:ALONSO, EOMI  Ear/Nose/Throat: no oral lesions, NG tube	  Respiratory: clear BL  Cardiovascular: S1S2  Gastrointestinal:soft, (+) BS, no tenderness driveline exit site CDI  Extremities:no e/e/c  No Lymphadenopathy  IV sites not inflammed.    LABS:                        9.3    14.57 )-----------( 195      ( 07 Mar 2022 05:00 )             29.4     03-07    135  |  103  |  14  ----------------------------<  147<H>  4.2   |  24  |  0.97    Ca    8.4      07 Mar 2022 05:00  Phos  2.4     03-07  Mg     1.8     03-07    TPro  7.4  /  Alb  2.7<L>  /  TBili  0.2  /  DBili  x   /  AST  24  /  ALT  16  /  AlkPhos  132<H>  03-07    PTT - ( 07 Mar 2022 05:01 )  PTT:26.8 sec      MICROBIOLOGY:            RECENT CULTURES:  03-06 @ 14:17  .Blood Blood-Arterial  --  --  --    No growth to date.  --  03-05 @ 15:33  .Sputum Trach Site  --  --  --    Normal Respiratory Bria present  --  03-02 @ 21:52  .Blood Blood  --  --  --    No growth to date.  --      RADIOLOGY & ADDITIONAL STUDIES:    < from: CT Abdomen and Pelvis w/ Oral Cont and w/ IV Cont (03.03.22 @ 20:54) >  IMPRESSION:  Small bilateral pleural effusions and small volume ascites, new from   prior exam. No drainable fluid collection.    A 2.6 m hypoattenuating splenic lesion is decreased.    Aortic thrombus narrowing the lumen at least 50% at the infrarenal   segment, increased from the prior exam.    < end of copied text >    < from: CT Abdomen and Pelvis w/ Oral Cont and w/ IV Cont (03.03.22 @ 20:54) >  IMPRESSION:  Small bilateral pleural effusions and small volume ascites, new from   prior exam. No drainable fluid collection.    A 2.6 m hypoattenuating splenic lesion is decreased.    Aortic thrombus narrowing the lumen at least 50% at the infrarenal   segment, increased from the prior exam.    < end of copied text >

## 2022-03-07 NOTE — PROGRESS NOTE ADULT - SUBJECTIVE AND OBJECTIVE BOX
Subjective:      Medications:  cefepime   IVPB 1000 milliGRAM(s) IV Intermittent every 12 hours  chlorhexidine 2% Cloths 1 Application(s) Topical <User Schedule>  cholecalciferol 1000 Unit(s) Oral daily  ergocalciferol 94851 Unit(s) Oral every week  insulin lispro (ADMELOG) corrective regimen sliding scale   SubCutaneous every 6 hours  methimazole 10 milliGRAM(s) Oral daily  mexiletine 150 milliGRAM(s) Oral every 8 hours  midodrine 20 milliGRAM(s) Oral every 8 hours  multivitamin 1 Tablet(s) Oral daily  pantoprazole  Injectable 40 milliGRAM(s) IV Push every 12 hours  senna 2 Tablet(s) Oral at bedtime  sertraline 100 milliGRAM(s) Oral daily      ICU Vital Signs Last 24 Hrs  T(C): 36.9, Max: 37.4 ( @ 20:00)  HR: 80 (72 - 93)  BP: --  BP(mean): --  ABP: 94/70 (81/64 - 108/82)  ABP(mean): 82 (72 - 94)  RR: 22 (22 - 36)  SpO2: 98% (96% - 99%)    Weight in k.3 (22)  Weight in k (22)      I&O's Summary Last 24 Hrs    IN: 2310 mL / OUT: 1735 mL / NET: 575 mL      Tele:    Physical Exam:    General: No distress. Comfortable.  HEENT: EOM intact.  Neck: JVP not elevated.  Respiratory: Clear to auscultation bilaterally  CV: RRR. Normal S1 and S2. No murmurs, rub, or gallops. Radial pulses normal.  Abdomen: Soft, non-distended, non-tender  Skin: No skin lesions  Extremities: Warm, no edema  Neurology: Non-focal, alert and oriented times three.   Psych: Affect normal    Labs:    ( 22 @ 05:00 )               9.3    14.57 )--------( 195                  29.4     ( 22 @ 05:00 )     135  |  103  |  14  ---------------------<  147  4.2  |  24  |  0.97    Ca 8.4  Phos 2.4  Mg 1.8    ( 22 @ 05:00 )  TPro  7.4  /  Alb  2.7  /  TBili  0.2  /  DBili  x   /  AST  24  /  ALT  16  /  AlkPhos  132  ( 22 @ 04:34 )  TPro  7.1  /  Alb  2.9  /  TBili  0.3  /  DBili  x   /  AST  19  /  ALT  15  /  AlkPhos  128    PTT/PT/INR - ( 22 @ 05:01 )  PTT: 26.8 sec / PT: x     / INR: x        ( 22 @ 23:13 )  TropHS 24    / CK x     / CKMB x        Serum Pro-Brain Natriuretic Peptide: 35277 (22 @ 05:00)            Lactate Dehydrogenase, Serum: 201 (22 @ 05:00)  Lactate Dehydrogenase, Serum: 199 (22 @ 04:34)   Subjective:  - NAEO    Medications:  cefepime   IVPB 1000 milliGRAM(s) IV Intermittent every 12 hours  chlorhexidine 2% Cloths 1 Application(s) Topical <User Schedule>  cholecalciferol 1000 Unit(s) Oral daily  ergocalciferol 00620 Unit(s) Oral every week  insulin lispro (ADMELOG) corrective regimen sliding scale   SubCutaneous every 6 hours  methimazole 10 milliGRAM(s) Oral daily  mexiletine 150 milliGRAM(s) Oral every 8 hours  midodrine 20 milliGRAM(s) Oral every 8 hours  multivitamin 1 Tablet(s) Oral daily  pantoprazole  Injectable 40 milliGRAM(s) IV Push every 12 hours  senna 2 Tablet(s) Oral at bedtime  sertraline 100 milliGRAM(s) Oral daily      ICU Vital Signs Last 24 Hrs  T(C): 36.9, Max: 37.4 ( @ 20:00)  HR: 80 (72 - 93)  BP: --  BP(mean): --  ABP: 94/70 (81/64 - 108/82)  ABP(mean): 82 (72 - 94)  RR: 22 (22 - 36)  SpO2: 98% (96% - 99%)    Weight in k.3 (22)  Weight in k (22)      I&O's Summary Last 24 Hrs    IN: 2310 mL / OUT: 1735 mL / NET: 575 mL      Tele: SR 70-90s    Physical Exam:    General: Frail. No acute distress.  HEENT: EOM intact. Open prior tracheostomy site covered with gauze  Neck: Neck supple. JVP not elevated. No masses  Chest: Clear to auscultation bilaterally  CV: LVAD hum. No palpable pulses.  Abdomen: Soft, non-distended, non-tender. LVAD driveline site with dressing c/d/i. perc choley drain in place with bilious output.  Skin: warm peripherally.  Neurology: Somnolent, opens eyes but does not follow commands  Psych: MARELY    LVAD heart mate 2  speed 9200  Flow 5.6  PI 6.3  Power 5.9  No PI events, no changes made  DLES dressing C/D/I    Labs:    ( 22 @ 05:00 )               9.3    14.57 )--------( 195                  29.4     ( 22 @ 05:00 )     135  |  103  |  14  ---------------------<  147  4.2  |  24  |  0.97    Ca 8.4  Phos 2.4  Mg 1.8    ( 22 @ 05:00 )  TPro  7.4  /  Alb  2.7  /  TBili  0.2  /  DBili  x   /  AST  24  /  ALT  16  /  AlkPhos  132  ( 22 @ 04:34 )  TPro  7.1  /  Alb  2.9  /  TBili  0.3  /  DBili  x   /  AST  19  /  ALT  15  /  AlkPhos  128    PTT/PT/INR - ( 22 @ 05:01 )  PTT: 26.8 sec / PT: x     / INR: x        ( 22 @ 23:13 )  TropHS 24    / CK x     / CKMB x        Serum Pro-Brain Natriuretic Peptide: 94307 (22 @ 05:00)    Lactate Dehydrogenase, Serum: 201 (22 @ 05:00)  Lactate Dehydrogenase, Serum: 199 (22 @ 04:34)

## 2022-03-07 NOTE — PROGRESS NOTE ADULT - ASSESSMENT
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 s/p HM2 LVAD in 9/2017 at  (due to severe PAD) with TV ring, multiple GI bleeds, thus maintained off AC, CKD 3prior COVID-19 infection in 4/2020, recurrent syncope post LVAD s/p ILR, s/p prolonged complex hospitalization from 6/14-11/16/2021 initially admitted with abdominal pain complicated by GIB, respiratory failure s/p trach, multiple infections, s/p cholecystotomy tube and SMA stent 10/2021, ultimately discharged to [facility] rehab and then returned to [facility] for trach decannulation 12/2. The patient had a recent admission with COVID 19 reinfection and distributive shock. That admission he had blood culture positive for serratia marcescens (discharged on levaquin).  He was treated with MAB, remdesivir, and steroids. He had recurrent VT and was started mexiletine. Now coming in from [facility] ED with leukocytosis and AMS was treated for UTI. Initially required bipap but was weaned off here. Labs concerning here for WC 26, hemoglobin of 7s then 6.7, BNP 5K, creatinine of 1.3. His maps were initially in the 50s. Physical exam showing pus from stoma, sat of central access of 81.8, and tachycardia are all concerning for septic shock picture. He is s/p 1L fluids, 1uPRBCs, was placed on vaso and lidocaine     Upon admission HM2 4.8 LPM, 9200 RPM     He was found to be bacteremic with recurrent serratia, on IV abx possibly r/t LVAD infection vs splenic abscess noted on CT 1/19. Afebrile since 3/1. Leukocytosis improving. Low CVP this morning despite albumin and IVF with transient improvement. s/p 1 unit PRBC 3/6. Vasopressin weaned off 3/4, started on midodrine. LVAD without s/s of pump malfunction. Overall, critically ill with guarded prognosis.

## 2022-03-07 NOTE — PROGRESS NOTE ADULT - ASSESSMENT
66 years old male with PMHx of Stage D HF 2/2 NICM s/p HM2 LVAD in 9/2017 at  (due to severe PAD) with TV ring, multiple GI bleeds, no AC, CKD 3 prior COVID-19 infection in 4/2020, chronic cholecystitis s/p percutaneous cholecystostomy tube, recurrent COVID infection, Serratia bacteremia transferred from [facility] for sepsis.    ID is consulted for septic shock  Recently had Serratia bacteremia discharged on suppressive PO Levaquin  Returned with leukocytosis, fever, AMS, hypotension requiring pressors  ?purulent sputum coming out from previous trach site  CXR no PNA  CTH unrevealing  LVAD exit sites shows no signs of infection  Perc dawn tube site shows no signs of infection  BCx Serratia, S cefepime and ertapenem, R quinolones  CT showed abdominal aortic thrombus and a 2.6cm splenic lesion    Antibiotics:  Vancomycin 2/28 - 3/1  Zosyn 2/28  Cefepime 2/28 -     Currently unclear etiology of septic shock likely secondary to recurrent serratia bacteremia  Source of serratia is could be LVAD, infected aortic thrombus or splenic lesion  Regardless will treat this as in intravascular infection so likely 4 - 6 weeks in total    Detailed discussion with CICU attending Dr. Jensen, patient likely would have worsening respiratory status but due to his multiple comorbidities and previous intubation, it is futile to escalate care to intubation as patient will likely unable to be weaned off ventilator.      IMPRESSION:  Septic shock 2/2 serratia bacteremia  Aortic thrombus  Splenic lesion  Leukocytosis  Fever  LVAD      RECOMMENDATIONS:  - Continue IV cefepime 1gm q12hrs; can change to IV ertapenem 1gm q24hrs if patient continues to have poor mental status  - Follow up repeat blood cultures show evidence of bacteremia clearance  - mental status improved but not yet at baseline, off pressors and oxygen supplementation    Morris Prater MD  [phone]  After 5pm/weekends [phone]

## 2022-03-07 NOTE — PROGRESS NOTE ADULT - ASSESSMENT
A/P: 65M Hx ESHF w/ VAD, recurrent serratia bacteremia, pneumonia, intubated/trach x2 cycles, chronic gall bladder disease s/p percutaneous cholecystectomy, SMA ischemia s/p stent, cachexia who presented from [facility] for septic shock.    ====================== NEUROLOGY=====================  AMS  - per nursing home staff pt has been altered for one week  - pt AAOx0, not verbally responding to staff  - continue to monitor neuro status  - CTH at OSH negative for acute pathology   - 2/28 CTH: Mild to mod white matter microvascular ischemic disease. No acute pathology   - unable to get Brain MRI 2/2 LVAD. consider repeat CTH in a few days if AMS unchanges  - vEEG neg for seizure activity   - c/w sertraline     ==================== RESPIRATORY======================  Hypercpnic Respiratory failure   - Pt hypercarbic at OSH, placed on BiPAP, now weaned off  - appears comfortable now on room air   - continue to monitor respiratory status     Hx trach w/ stoma  - Pt trach x2 cycles in past  - most recently in setting of COVID PNA in 02/2022  - Pt decannulated after admission  - purulent drainage in stoma site, cultured pending results  - ENT states no sign of infection at Stoma site- Plan for possible TE fistula closure when pt is stable as per ENT     ====================CARDIOVASCULAR==================  ACC/AHA stage D systolic heart failure s/p LVAD + Distributive Shock   - pt appeared hypovolemic on admission  - on and off pressor support w/ Vaso for MAP goal > 70   - started Midodrine 10 TID and IVF resuscitation PRN, has been responsive to albumin   - central sat sent from intro 81.8 with negative lactate x4    Ventricular Tachycardia  - pt has history of VT on previous admissions, takes home mexiletine  - significant NSVT and VT seen on tele in CICU overnight and this AM  - s/p lido bolus 100mg and 50mg and started on lido gtt restarted Mexitil 150 TID   - maintain lido .5, consider weaning in AM   - monitor on tele, monitor lytes     ===================HEMATOLOGIC/ONC ===================  Anemia  - Hgb on admission 7.4 with subsequent drop to 6.6  - s/p 2u PRBC 2/28. Now h/h has been stable >48hrs. Will check cbc q12-24  - of note, pt has hx of GI bleeds  - no blood noted in BM, continue to monitor   - transfuse as needed if hgb <7    ===================== RENAL =========================  RASHAWN  - serum Cr downtrended   - continue to monitor SCr, BUN, lytes, and I&Os  - replete lytes prn with goal K 4-4.5 and mg >2    ==================== GASTROINTESTINAL===================  Hx GI bleed in past req multiple transfusions   - no overt bleeding noted on this admission   - monitor BM for signs of blood. h/h stable for past 24 hrs without any blood noted in BM  - c/w PPI  - Bowel regimen with Senna    Diet  - Jevity 55cc/hr  =======================    ENDOCRINE  =====================  Hx Hyperthyroidism   - continue home methimazole     Hyperglycemia   -maintain q6h ISS while on continuous enteral feeds     ========================INFECTIOUS DISEASE================  Septic shock  - pt arrived tachycardic 120s, hypotensive MAPs 50-60, AMS  - pt got zosyn and vancomycin x1 at OSH. switched from zosyn to cefepime. s/p vanco-, d/c yesterday as BCX with serratia.   - BCX 2/28 gram neg rods x4 bottles-- growing serratia sensitive to Cefepime/Erta   - f/u stoma culture sent 3/5   - CT C/A/P 3/3 showing incr size of abd aortic thrombus (infected thrombus?)  - c/w Cefepime 1g q12h 2/28- , may need 4-6wk of therapy if this is intravascular infection     ======================= DISPOSITION  =====================  [X] Critical   [ ] Guarded    [ ] Stable    [X] Maintain in CICU  [ ] Downgrade to Telemetry  [ ] Discharge Home    Patient requires continuous monitoring with bedside rhythm monitoring, pulse ox monitoring, and intermittent blood gas analysis. Care plan discussed with ICU care team. Patient remained critical and at risk for life threatening decompensation.  Patient seen, examined and plan discussed with CCU team during rounds.     I have personally provided 30 minutes of critical care time excluding time spent on separate procedures, in addition to initial critical care time provided by the CICU Attending, Dr. Jensen/ Suzy/ Anneliese/ Mick/ Jeannette/ Ted .       Leann Cronin [facility] [phone]

## 2022-03-07 NOTE — CHART NOTE - NSCHARTNOTEFT_GEN_A_CORE
Nutrition Follow Up Note  Patient seen for: initial malnutrition follow up. Chart reviewed and events noted.     Per Chart: Pt is a 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 s/p HM2 LVAD in 2017 at  (due to severe PAD) with TV ring, multiple GI bleeds, thus maintained off AC, CKD 3prior COVID-19 infection in 2020, recurrent syncope post LVAD s/p ILR, s/p prolonged complex hospitalization from -2021 initially admitted with abdominal pain complicated by GIB, respiratory failure s/p trach, multiple infections, s/p cholecystotomy tube and SMA stent 10/2021, ultimately discharged to [facility] rehab and then returned to [facility] for trach decannulation , readmitted in 2022 with recurrent COVID-19 infection, initially in distributive shock. Pt was transferred to [facility] CICU from [facility]. He was sent to their ED from a rehab center due to AMS and tachycardia. At [facility], they noted a WBC greater than 20, tachycardia and hypercarbia on ABG. Pt was placed on BiPAP and transferred to [facility] CICU for concern for sepsis vs septic shock.     Source: [] Patient       [x] Medical Record        [x] RN        [] Family at bedside       [] Other:    -If unable to interview patient: [] Trach/Vent/BiPAP  [x] Disoriented/confused/inappropriate to interview    Nutrition-Related Events:   - Pressors:  [] Yes    [x] No   - Propofol:  [] Yes    [x] No         - Rate: __mL/hr. If maintained x 24 hours, propofol will provide:     Diet Order:   Diet, NPO with Tube Feed:   Tube Feeding Modality: Nasogastric  Jevity 1.2 Tank (JEVITY1.2RTH)  Total Volume for 24 Hours (mL): 1320  Continuous  Starting Tube Feed Rate {mL per Hour}: 10  Increase Tube Feed Rate by (mL): 10     Every 4 hours  Until Goal Tube Feed Rate (mL per Hour): 55  Tube Feed Duration (in Hours): 24  Tube Feed Start Time: 13:45  No Carb Prosource TF     Qty per Day:  2 (22)    EN Order Provides:    - Total volume: 1320 mL    - Kcal:   1584        - Gm Protein: 73     - mL free water: 1065   Protein Modular(s) Provides: 80 kcals and 22 gm protein   EN Order + Protein Modular: 1664 kcals (34.2 kcal/kg based on dosing wt 48.7 kG)  and 95 gm protein  (1.95 g/kg based on dosing wt 48.7 kG)  Current Pump Rate: 55 mL/hr   5-Day EN provision: 1,206 mL, 1,447 kcals, and 66.9 gm protein.   -> was increasing to goal. Now tolerating at goal.     Is current diet order appropriate/adequate? [x] Yes  []  No: however discontinue No Carb Prosource TF     Nutrition-related concerns:  - s/p HM2 LVAD in 2017  - purulent drainage in stoma site, cultured pending results  - pt has Hx of GI bleeds  - RASHAWN, resolved   - Continues on sliding scale of insulin  - Ordered for cholecalciferol, ergocalciferol, and multivitamin  - Low Phos during admission s/p NaPhosphate 3/7.     GI: No known recent N/V, constipation, or diarrhea.  Last BM 3/7.   Bowel Regimen? [x] Yes   [] No  Sybil Drain: 10 mL (3/5), 52.5 mL (3/4), 35 mL (3/2)    Weights:   Daily Weight in k.3 (), 54.4 ()  Dosing wt in k.7  Wt fluctuations noted and likely secondary to fluid shifts as pt noted with varying edema during admission    MEDICATIONS  (STANDING):  cefepime   IVPB  cholecalciferol  ergocalciferol  insulin lispro (ADMELOG) corrective regimen sliding scale  methimazole  mexiletine  midodrine  multivitamin  pantoprazole  Injectable  senna    Pertinent Labs:  @ 05:00: Na 135, BUN 14, Cr 0.97, <H>, K+ 4.2, Phos 2.4<L>, Mg 1.8, Alk Phos 132<H>, ALT/SGPT 16, AST/SGOT 24    A1C with Estimated Average Glucose Result: 5.3 % (22 @ 22:11)    Finger Sticks:  POCT Blood Glucose.: 147 mg/dL ( @ 05:15)  POCT Blood Glucose.: 134 mg/dL ( @ 23:21)  POCT Blood Glucose.: 175 mg/dL ( @ 17:32)  POCT Blood Glucose.: 144 mg/dL ( @ 12:25)    Skin per Wound Care Note 3/4: Sacral/bilateral buttocks deep tissue injuries, Right heel deep tissue injury, Left heel deep tissue injury, scrotal wound   Edema per nursing documentation: +1 L foot     Based on dosing wt 48.7 kG  Estimated Energy Needs: (30-35 kcals/kG) 7613-8873 kcals   Estimated Protein Needs: (1.4-1.9 gm/kG) 68.18-92.53 gm  Fluid needs deferred to provider.   [place] State Equation: N/A    Previous Nutrition Diagnosis: Malnutrition severe, chronic + Underweight   Nutrition Diagnosis is: [x] ongoing  [] resolved [] not applicable     Nutrition Care Plan:  [x] In Progress  [] Achieved  [] Not applicable    New Nutrition Diagnosis: [x] Not applicable    Nutrition Interventions:     Education Provided:       [] Yes:  [x] No:  Not appropriate at this time     Recommendations:      1) Continue Jevity 1.2 at goal rate 55 mL/hr x24 hours to provide total volume 1320 mL, 1584 kcals, 73 gm protein, and 1056 mL free water. Meets 32.5 kcals/kG & 1.5g protein/kG based on dosing weight 48.7 kG.  -> Discontinue No Carb Prosource TF   2) As medically feasible, continue to provide micronutrients.   -> Recommend addition of thiamin and Vitamin C supplementation daily.     Monitoring and Evaluation:   Continue to monitor nutritional intake, tolerance to diet prescription, weights, labs, skin integrity    RD remains available upon request and will follow up per protocol  Kiki Barajas RD, MS, CDN, [facility] Pager #[phone] Nutrition Follow Up Note  Patient seen for: initial malnutrition follow up. Chart reviewed and events noted.     Per Chart: Pt is a 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 s/p HM2 LVAD in 2017 at  (due to severe PAD) with TV ring, multiple GI bleeds, thus maintained off AC, CKD 3prior COVID-19 infection in 2020, recurrent syncope post LVAD s/p ILR, s/p prolonged complex hospitalization from -2021 initially admitted with abdominal pain complicated by GIB, respiratory failure s/p trach, multiple infections, s/p cholecystotomy tube and SMA stent 10/2021, ultimately discharged to [facility] rehab and then returned to [facility] for trach decannulation , readmitted in 2022 with recurrent COVID-19 infection, initially in distributive shock. Pt was transferred to [facility] CICU from [facility]. He was sent to their ED from a rehab center due to AMS and tachycardia. At [facility], they noted a WBC greater than 20, tachycardia and hypercarbia on ABG. Pt was placed on BiPAP and transferred to [facility] CICU for concern for sepsis vs septic shock.     Source: [] Patient       [x] Medical Record        [x] RN        [] Family at bedside       [] Other:    -If unable to interview patient: [] Trach/Vent/BiPAP  [x] Disoriented/confused/inappropriate to interview    Nutrition-Related Events:   - Pressors:  [] Yes    [x] No   - Propofol:  [] Yes    [x] No         - Rate: __mL/hr. If maintained x 24 hours, propofol will provide:     Diet Order:   Diet, NPO with Tube Feed:   Tube Feeding Modality: Nasogastric  Jevity 1.2 Tank (JEVITY1.2RTH)  Total Volume for 24 Hours (mL): 1320  Continuous  Starting Tube Feed Rate {mL per Hour}: 10  Increase Tube Feed Rate by (mL): 10     Every 4 hours  Until Goal Tube Feed Rate (mL per Hour): 55  Tube Feed Duration (in Hours): 24  Tube Feed Start Time: 13:45  No Carb Prosource TF     Qty per Day:  2 (22)    EN Order Provides:    - Total volume: 1320 mL    - Kcal:   1584        - Gm Protein: 73     - mL free water: 1065   Protein Modular(s) Provides: 80 kcals and 22 gm protein   EN Order + Protein Modular: 1664 kcals (34.2 kcal/kg based on dosing wt 48.7 kG)  and 95 gm protein  (1.95 g/kg based on dosing wt 48.7 kG)  Current Pump Rate: 55 mL/hr   5-Day EN provision: 1,206 mL, 1,447 kcals, and 66.9 gm protein.   -> was increasing to goal. Now tolerating at goal.     Is current diet order appropriate/adequate? [x] Yes  []  No: however discontinue No Carb Prosource TF     Nutrition-related concerns:  - s/p HM2 LVAD in 2017  - purulent drainage in stoma site, cultured pending results  - pt has Hx of GI bleeds  - RASHAWN, resolved   - Continues on sliding scale of insulin  - Ordered for cholecalciferol, ergocalciferol, and multivitamin  - Low Phos during admission s/p NaPhosphate 3/7.     GI: No known recent N/V, constipation, or diarrhea.  Last BM 3/7.   Bowel Regimen? [x] Yes   [] No  Sybil Drain: 10 mL (3/5), 52.5 mL (3/4), 35 mL (3/2)    Weights:   Daily Weight in k.3 (), 54.4 ()  Dosing wt in k.7  Wt fluctuations noted and likely secondary to fluid shifts as pt noted with varying edema during admission    MEDICATIONS  (STANDING):  cefepime   IVPB  cholecalciferol  ergocalciferol  insulin lispro (ADMELOG) corrective regimen sliding scale  methimazole  mexiletine  midodrine  multivitamin  pantoprazole  Injectable  senna    Pertinent Labs:  @ 05:00: Na 135, BUN 14, Cr 0.97, <H>, K+ 4.2, Phos 2.4<L>, Mg 1.8, Alk Phos 132<H>, ALT/SGPT 16, AST/SGOT 24    A1C with Estimated Average Glucose Result: 5.3 % (22 @ 22:11)    Finger Sticks:  POCT Blood Glucose.: 147 mg/dL ( @ 05:15)  POCT Blood Glucose.: 134 mg/dL ( @ 23:21)  POCT Blood Glucose.: 175 mg/dL ( @ 17:32)  POCT Blood Glucose.: 144 mg/dL ( @ 12:25)    Skin per Wound Care Note 3/4: Sacral/bilateral buttocks deep tissue injuries, Right heel deep tissue injury, Left heel deep tissue injury, scrotal wound   Edema per nursing documentation: +1 L foot     Based on dosing wt 48.7 kG  Estimated Energy Needs: (30-35 kcals/kG) 1197-8691 kcals   Estimated Protein Needs: (1.4-1.9 gm/kG) 68.18-92.53 gm  Fluid needs deferred to provider.   [place] State Equation: N/A    Previous Nutrition Diagnosis: Malnutrition severe, chronic + Underweight   Nutrition Diagnosis is: [x] ongoing  [] resolved [] not applicable     Nutrition Care Plan:  [x] In Progress  [] Achieved  [] Not applicable    New Nutrition Diagnosis: [x] Not applicable    Nutrition Interventions:     Education Provided:       [] Yes:  [x] No:  Not appropriate at this time     Recommendations:      1) Continue Jevity 1.2 at goal rate 55 mL/hr x24 hours to provide total volume 1320 mL, 1584 kcals, 73 gm protein, and 1056 mL free water. Meets 32.5 kcals/kG & 1.5g protein/kG based on dosing weight 48.7 kG.  -> Discontinue No Carb Prosource TF   -> Trend GOC  2) As medically feasible, continue to provide micronutrients.   -> Recommend addition of thiamin and Vitamin C supplementation daily.     Monitoring and Evaluation:   Continue to monitor nutritional intake, tolerance to diet prescription, weights, labs, skin integrity    RD remains available upon request and will follow up per protocol  Kiki Barajas RD, MS, CDN, [facility] Pager #[phone]

## 2022-03-07 NOTE — PROGRESS NOTE ADULT - ATTENDING COMMENTS
overall no significant change.   off pressors. remains minimally responsive. tolerating feeds. no fevers.   meds: IV cefipime, mexil 150 Q 8, mitodrine 20 Q 8, PRBC X1, fluids 950   CVP 1, LVAD 9200, Flow 5.6, PI 6.3,   HR 70-90SR, MAPs 72-86. Tmax 37.4  I/O: +535.   03-07  135  |  103  |  14  ----------------------------<  147<H>  4.2   |  24  |  0.97  Ca    8.4      07 Mar 2022 05:00  Phos  2.4     03-07  Mg     1.8     03-07  TPro  7.4  /  Alb  2.7<L>  /  TBili  0.2  /  DBili  x   /  AST  24  /  ALT  16  /  AlkPhos  132<H>  03-07                        9.3    14.57 )-----------( 195      ( 07 Mar 2022 05:00 )             29.4   WBC 14.6, 13.0   Remains critically ill, without significant change.   No concerns at present about airway protection, opening the possibility of transfer to the floor.   Awaiting negative BC to permit PICC line placement.   PRN albumin Q shift.   Zi Werner

## 2022-03-07 NOTE — PROGRESS NOTE ADULT - SUBJECTIVE AND OBJECTIVE BOX
RICKY JOINT  MRN#: [id number]  Subjective: pulmonary progress note  : acute hypercapnic respiratory failure . Septic shock , service rendered on 2002   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 s/p HM2 LVAD in 2017 at  (due to severe PAD) with TV ring, multiple GI bleeds, thus maintained off AC, CKD 3prior COVID-19 infection in 2020, recurrent syncope post LVAD s/p ILR, s/p prolonged complex hospitalization from -2021 initially admitted with abdominal pain complicated by GIB, respiratory failure s/p trach, multiple infections, s/p cholecystotomy tube and SMA stent 10/2021, ultimately discharged to [facility] rehab and then returned to [facility] for trach decannulation , readmitted in 2022 with recurrent COVID-19 infection, initially in distributive shock. Blood cultures were also positive for serratia marcescens which he has had in the past. seen by heart failure team patient is DNR/DNI  continue on 3 liter nasal 02 and vasopressin, considered Merik  prtocol , off vasopressin          (2022 22:20)    PAST MEDICAL & SURGICAL HISTORY:  CHF (congestive heart failure)    CAD (coronary artery disease)  Depression    Pleural effusion    History of 2019 novel coronavirus disease (COVID-19)  2020    Hemorrhoids    Bleeding hemorrhoids    Peripheral arterial disease    Claudication    BPH with urinary obstruction    ACC/AHA stage D systolic heart failure    Anticoagulation goal of INR 2.0 to 2.5    Falls    Clavicle fracture    CKD (chronic kidney disease), stage III    Iron deficiency anemia    H/O epistaxis    Vertigo    GI bleed    S/P TVR (tricuspid valve repair)    S/P ventricular assist device    S/P endoscopy    H/O tracheostomy        FAMILY HISTORY:    Social History:    Marital Status:  X    (   ) Single    (   )    (  )   Occupation: Retired   Lives with: (  ) alone  (  ) children  X spouse   (  ) parents  (  ) other    Substance Use (street drugs): X never used  (  ) other:  Tobacco Usage: X never smoked   (   ) former smoker   (   ) current smoker  (     ) pack year  (    ) last cigarette date  Alcohol Usage: Social         OBJECTIVE:  ICU Vital Signs Last 24 Hrs  T(C): 37.2 (01 Mar 2022 16:00), Max: 38.4 (01 Mar 2022 09:00)  T(F): 99 (01 Mar 2022 16:00), Max: 101.1 (01 Mar 2022 09:00)  HR: 109 (01 Mar 2022 18:00) (67 - 124)  BP: --  BP(mean): 86 (2022 20:00) (86 - 86)  ABP: 89/74 (01 Mar 2022 18:00) (75/63 - 160/85)  ABP(mean): 81 (01 Mar 2022 18:00) (68 - 139)  RR: 38 (01 Mar 2022 18:00) (5 - 40)  SpO2: 99% (01 Mar 2022 18:00) (94% - 100%)       @ 07: @ 07:00  --------------------------------------------------------  IN: 1060.7 mL / OUT: 1600 mL / NET: -539.3 mL     @ : @ 18:22  --------------------------------------------------------  IN: 677.8 mL / OUT: 455 mL / NET: 222.8 mL    PHYSICAL EXAM :Daily Height in cm: 177.8 (2022 21:20)  Elderly frail male on 3 liter nasal cannula    Daily Weight in k.7 (2022 21:20)  HEENT:     + NCAT  + EOMI  - Conjuctival edema   - Icterus   - Thrush   - ETT  - NGT/OGT  Neck:         + FROM RT IJ  JVD     - Nodes     - Masses    + Mid-line trachea   - Tracheostomy  Chest:           mild kyphosis   Lungs:          + CTA  + Rhonchi  + Rales    - Wheezing  + Decreased BS   - Dullness R L  Cardiac:       + S1 + S2    + RRR   - Irregular   - S3  - S4    - Murmurs   - Rub   - Hamman’s sign   Abdomen:    + BS     + Soft    + Non-tender  - Distended  - Organomegaly  - PEG Cholecystostomy tube in place   Extremities:   - Cyanosis U/L   - Clubbing  U/L  - LE/UE Edema   + Capillary refill    + Pulses   Neuro:        + Awake   +  Alert   - Confused   - Lethargic   - Sedated   - Generalized Weakness  Skin:        - Rashes    - Erythema   + Normal incisions   + IV sites intact  -      HOSPITAL MEDICATIONS: All mediciations reviewed and analyzed  MEDICATIONS  (STANDING):  albumin human  5% IVPB 250 milliLiter(s) IV Intermittent once  cefepime   IVPB 2000 milliGRAM(s) IV Intermittent every 12 hours  chlorhexidine 2% Cloths 1 Application(s) Topical <User Schedule>  cholecalciferol 1000 Unit(s) Oral daily  dextrose 40% Gel 15 Gram(s) Oral once  dextrose 50% Injectable 25 Gram(s) IV Push once  gabapentin Solution 100 milliGRAM(s) Oral three times a day  glucagon  Injectable 1 milliGRAM(s) IntraMuscular once  insulin lispro (ADMELOG) corrective regimen sliding scale   SubCutaneous every 6 hours  magnesium sulfate  IVPB 1 Gram(s) IV Intermittent once  methimazole 10 milliGRAM(s) Oral daily  metoclopramide 10 milliGRAM(s) Oral four times a day  mexiletine 150 milliGRAM(s) Oral every 8 hours  mirtazapine 7.5 milliGRAM(s) Oral at bedtime  multivitamin 1 Tablet(s) Oral daily  pantoprazole  Injectable 40 milliGRAM(s) IV Push daily  potassium phosphate IVPB 15 milliMole(s) IV Intermittent once  senna 2 Tablet(s) Oral at bedtime  sertraline 100 milliGRAM(s) Oral daily  vancomycin  IVPB 750 milliGRAM(s) IV Intermittent every 24 hours  vasopressin Infusion 0.02 Unit(s)/Min (1.2 mL/Hr) IV Continuous <Continuous>    MEDICATIONS  (PRN):    LABS: All Lab data reviewed and analyzed                        9.3    14.57 )-----------( 195      ( 07 Mar 2022 05:00 )             29.4   03-07    135  |  103  |  14  ----------------------------<  147<H>  4.2   |  24  |  0.97    Ca    8.4      07 Mar 2022 05:00  Phos  2.4     03-07  Mg     1.8     03-07    TPro  7.4  /  Alb  2.7<L>  /  TBili  0.2  /  DBili  x   /  AST  24  /  ALT  16  /  AlkPhos  132<H>      Ca    8.4      05 Mar 2022 04:34  Phos  2.3       Mg     1.8         TPro  7.1  /  Alb  2.9<L>  /  TBili  0.3  /  DBili  x   /  AST  19  /  ALT  15  /  AlkPhos  128<H>             PTT - ( 01 Mar 2022 00:08 )  PTT:25.0 sec LIVER FUNCTIONS - ( 01 Mar 2022 17:38 )  Alb: 2.9 g/dL / Pro: 7.3 g/dL / ALK PHOS: 116 U/L / ALT: 30 U/L / AST: 29 U/L / GGT: x           RADIOLOGY: - Reviewed and analyzed

## 2022-03-07 NOTE — CHART NOTE - NSCHARTNOTEFT_GEN_A_CORE
RICKY HAMPTON  MRN-[id number]      Accepting Hospitalist: Dr. Cate Hoffman   ==========  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 s/p HM2 LVAD in 9/2017 at  (due to severe PAD) with TV ring, multiple GI bleeds, thus maintained off AC, CKD 3prior COVID-19 infection in 4/2020, recurrent syncope post LVAD s/p ILR, s/p prolonged complex hospitalization from 6/14-11/16/2021 initially admitted with abdominal pain complicated by GIB, respiratory failure s/p trach, multiple infections, s/p cholecystotomy tube and SMA stent 10/2021, ultimately discharged to [facility] rehab and then returned to [facility] for trach decannulation 12/2, readmitted in 2/2022 with recurrent COVID-19 infection, initially in distributive shock. Blood cultures were also positive for serratia marcescens which he has had in the past.     Pt was transferred to [facility] CICU from [facility]. He was sent to their ED from a rehab center due to AMS and tachycardia. At [facility], they noted a WBC greater than 20, tachycardia and hypercarbia on ABG. Pt was placed on BiPAP and transferred to [facility] CICU for concern for sepsis vs septic shock.      (27 Feb 2022 22:20)    ==========    INTERVAL HISTORY:  Patient presenting with AMS- infectious workup sent and found to have serratia bacteremia x4.     SUBJECTIVE:          OBJECTIVE:     =============================  Vital Signs Last 24 Hrs  T(C): 37 (07 Mar 2022 16:00), Max: 37.4 (06 Mar 2022 20:00)  T(F): 98.6 (07 Mar 2022 16:00), Max: 99.3 (06 Mar 2022 20:00)  HR: 80 (07 Mar 2022 16:00) (72 - 92)  BP: --  BP(mean): --  RR: 20 (07 Mar 2022 16:00) (20 - 36)  SpO2: 98% (07 Mar 2022 16:00) (96% - 98%)  ICU Vital Signs Last 24 Hrs  T(C): 37 (07 Mar 2022 16:00), Max: 37.4 (06 Mar 2022 20:00)  T(F): 98.6 (07 Mar 2022 16:00), Max: 99.3 (06 Mar 2022 20:00)  HR: 80 (07 Mar 2022 16:00) (72 - 92)  BP: --  BP(mean): --  ABP: 102/77 (07 Mar 2022 16:00) (81/64 - 104/76)  ABP(mean): 88 (07 Mar 2022 16:00) (72 - 89)  RR: 20 (07 Mar 2022 16:00) (20 - 36)  SpO2: 98% (07 Mar 2022 16:00) (96% - 98%)    ==============================      ============================================  MEDICATIONS  (STANDING):  cefepime   IVPB 1000 milliGRAM(s) IV Intermittent every 12 hours  chlorhexidine 2% Cloths 1 Application(s) Topical <User Schedule>  cholecalciferol 2000 Unit(s) Oral daily  insulin lispro (ADMELOG) corrective regimen sliding scale   SubCutaneous every 6 hours  methimazole 10 milliGRAM(s) Oral daily  mexiletine 150 milliGRAM(s) Oral every 8 hours  midodrine 20 milliGRAM(s) Oral every 8 hours  multivitamin 1 Tablet(s) Oral daily  pantoprazole  Injectable 40 milliGRAM(s) IV Push every 12 hours  senna 2 Tablet(s) Oral at bedtime  sertraline 100 milliGRAM(s) Oral daily    MEDICATIONS  (PRN):    ============================================        ==========  TELEMETRY  ==========    ==========  FOLLOW UP:  ==========  -      Katheryn Cuba PA-C RICKY HAMPTON  MRN-[id number]      Accepting Hospitalist: Dr. Cate Hoffman   ==========  HPI:  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 s/p HM2 LVAD in 9/2017 at  (due to severe PAD) with TV ring, multiple GI bleeds, thus maintained off AC, CKD 3prior COVID-19 infection in 4/2020, recurrent syncope post LVAD s/p ILR, s/p prolonged complex hospitalization from 6/14-11/16/2021 initially admitted with abdominal pain complicated by GIB, respiratory failure s/p trach, multiple infections, s/p cholecystotomy tube and SMA stent 10/2021, ultimately discharged to [facility] rehab and then returned to [facility] for trach decannulation 12/2, readmitted in 2/2022 with recurrent COVID-19 infection, initially in distributive shock. Blood cultures were also positive for serratia marcescens which he has had in the past.     Pt was transferred to [facility] CICU from [facility]. He was sent to their ED from a rehab center due to AMS and tachycardia. At [facility], they noted a WBC greater than 20, tachycardia and hypercarbia on ABG. Pt was placed on BiPAP and transferred to [facility] CICU for concern for sepsis vs septic shock.      (27 Feb 2022 22:20)    ==========    INTERVAL HISTORY:  Patient presenting with AMS- infectious workup sent and found to have serratia bacteremia x4. Initially presenting with hypercapnic resp failure requiring BiPAP  CTH done for AMS x3- chronic microvascular changes. CT C/A/P done to identify source- none definitively on scan. [facility] discussion had with palliative team and patient made DNR/DNI 3/3.     SUBJECTIVE:  Unable to ask 2/2 pt AMS        OBJECTIVE:     =============================  Vital Signs Last 24 Hrs  T(C): 37 (07 Mar 2022 16:00), Max: 37.4 (06 Mar 2022 20:00)  T(F): 98.6 (07 Mar 2022 16:00), Max: 99.3 (06 Mar 2022 20:00)  HR: 80 (07 Mar 2022 16:00) (72 - 92)  RR: 20 (07 Mar 2022 16:00) (20 - 36)  SpO2: 98% (07 Mar 2022 16:00) (96% - 98%)  ICU Vital Signs Last 24 Hrs  T(C): 37 (07 Mar 2022 16:00), Max: 37.4 (06 Mar 2022 20:00)  T(F): 98.6 (07 Mar 2022 16:00), Max: 99.3 (06 Mar 2022 20:00)  HR: 80 (07 Mar 2022 16:00) (72 - 92)  ABP: 102/77 (07 Mar 2022 16:00) (81/64 - 104/76)  ABP(mean): 88 (07 Mar 2022 16:00) (72 - 89)  RR: 20 (07 Mar 2022 16:00) (20 - 36)  SpO2: 98% (07 Mar 2022 16:00) (96% - 98%)    ==============================      ============================================  MEDICATIONS  (STANDING):  cefepime   IVPB 1000 milliGRAM(s) IV Intermittent every 12 hours  chlorhexidine 2% Cloths 1 Application(s) Topical <User Schedule>  cholecalciferol 2000 Unit(s) Oral daily  insulin lispro (ADMELOG) corrective regimen sliding scale   SubCutaneous every 6 hours  methimazole 10 milliGRAM(s) Oral daily  mexiletine 150 milliGRAM(s) Oral every 8 hours  midodrine 20 milliGRAM(s) Oral every 8 hours  multivitamin 1 Tablet(s) Oral daily  pantoprazole  Injectable 40 milliGRAM(s) IV Push every 12 hours  senna 2 Tablet(s) Oral at bedtime  sertraline 100 milliGRAM(s) Oral daily    MEDICATIONS  (PRN):    ============================================        ==========  TELEMETRY: Freq NSVT, NSR in 80s   ==========    ==========  FOLLOW UP:  - f/u GOC discussion with family in regards to PEG placement  - Eventual PICC placement prior to d/c for 6wk course of IV ABX  - f/u with social work for rehab placement   ==========  ====================ASSESSMENT ==============  65M PMH LVAD, recurrent serratia bacteremia, pneumonia, intubated/trach x2 cycles, chronic gall bladder disease s/p percutaneous cholecystectomy, SMA ischemia s/p stent, cachexia who presented from [facility] for septic shock    Plan:  ====================== NEUROLOGY=====================  AMS  - per nursing home staff pt has been altered for one week  - pt AAOx0, not verbally responding to staff  - continue to monitor neuro status  - CTH at OSH negative for acute pathology   - 2/28 CTH: Mild to mod white matter microvascular ischemic disease. No acute pathology   - 3/3 CTH: Chronic microvascular changes, no acute pathology   - unable to get Brain MRI 2/2 LVAD.   - vEEG: generalized slowing and no evidence for focal slowing, epileptiform discharges, or seizures. Now d/c   - c/w sertraline   - Neuro following, recs appreciated. Will send ammonia and B1 levels. Start Vit D       ==================== RESPIRATORY======================  Hypercapnic Respiratory failure   - Pt hypercarbic at OSH, placed on BiPAP, now weaned off  - appears comfortable now on room air   - continue to monitor respiratory status     Hx trach w/ stoma  - Pt trach x2 cycles in past  - most recently in setting of COVID PNA in 02/2022  - Pt decannulated after admission  - purulent drainage in stoma site, cultured pending results  - ENT states no sign of infection at Stoma site- Plan for possible TE fistula closure when pt is stable as per ENT. Spoke to ENT 3/7 in regards to fistula closure and states will do as an outpatient once off supplemental oxygen for several weeks. No plan for any closure while inpatient.        ====================CARDIOVASCULAR==================  ACC/AHA stage D systolic heart failure s/p LVAD + Distributive Shock   - pt initially appeared hypovolemic on admission  - on and off pressor support w/ Vaso for MAP goal > 70   -  c/w Midodrine 20 TID to maintain MAP >70   - central sat sent from intro 81.8 with negative lactate x4    Ventricular Tachycardia  - pt has history of VT on previous admissions, takes home mexiletine  - significant NSVT and VT seen on tele in CICU overnight and this AM  - s/p lido gtt- weaned off 3/5 afternoon   - restarted home Mexitil 150 TID   - monitor on tele, monitor lytes   - EP following     Aortic thrombus  - enlarging aortic thrombus on CT CAP. patient has a history of recurrent GI bleeds and was unable to tolerate AC outpatient. Therefore risk/benefit does not favor AC.  - Monitor off AC at this time     ===================HEMATOLOGIC/ONC ===================  Anemia  - Hgb on admission 7.4 with subsequent drop to 6.6  - s/p 2u PRBC 2/28. Last pRBC transfused 3/6 at 3am. Will check cbc q12-24  - of note, pt has hx of GI bleeds  - no blood noted in BM, continue to monitor   - transfuse as needed if hgb <7    ===================== RENAL =========================  RASHAWN, resolved   - serum Cr downtrended and now stable at baseline   - continue to monitor SCr, BUN, lytes, and I&Os  - replete lytes prn with goal K 4-4.5 and mg >2    ==================== GASTROINTESTINAL===================  Hx GI bleed in past req multiple transfusions   - no overt bleeding noted on this admission   - monitor BM for signs of blood. h/h stable for past 24 hrs without any blood noted in BM  - c/w PPI  - Bowel regimen with Senna    Diet  - c/w Jevity 55cc/hr as tolerated  - Consider PEG placement, discuss GOC w/ Fam      =======================    ENDOCRINE  =====================  Hx Hyperthyroidism   - continue home methimazole     Hyperglycemia   -maintain q6h ISS while on continuous enteral feeds       ========================INFECTIOUS DISEASE================  Septic shock  - pt got zosyn and vancomycin x1 at OSH. switched from zosyn to cefepime. s/p vanco-, d/c yesterday as BCX with serratia.   - BCX 2/28 gram neg rods x4 bottles-- growing serratia sensitive to Cefepime/Erta   - f/u stoma culture sent 3/5- Sputum culture neg   - 3/2 BCX NGTD x2, pending 3/6 BCX  - CT C/A/P 3/3 showing incr size of abd aortic thrombus (infected thrombus?)  - c/w Cefepime 1g q12h 2/28- , may need 4-6wk of therapy if this is intravascular infection. PICC placement needed- request put in for today       GOC  - DNR/DNI  - Consider PEG placement, discuss GOC w/ Fam  - Palliative following, recs appreciated         Katheryn Cuba PA-C RICKY HAMPTON  MRN-[id number]      Accepting Hospitalist: Dr. Cate Hoffman   Signout given to MAR 3/8- states likely will be covered by teams and I did not need to sign out to [facility] ACP   ==========  HPI:  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 s/p HM2 LVAD in 9/2017 at  (due to severe PAD) with TV ring, multiple GI bleeds, thus maintained off AC, CKD 3prior COVID-19 infection in 4/2020, recurrent syncope post LVAD s/p ILR, s/p prolonged complex hospitalization from 6/14-11/16/2021 initially admitted with abdominal pain complicated by GIB, respiratory failure s/p trach, multiple infections, s/p cholecystotomy tube and SMA stent 10/2021, ultimately discharged to [facility] rehab and then returned to [facility] for trach decannulation 12/2, readmitted in 2/2022 with recurrent COVID-19 infection, initially in distributive shock. Blood cultures were also positive for serratia marcescens which he has had in the past.     Pt was transferred to [facility] CICU from [facility]. He was sent to their ED from a rehab center due to AMS and tachycardia. At [facility], they noted a WBC greater than 20, tachycardia and hypercarbia on ABG. Pt was placed on BiPAP and transferred to [facility] CICU for concern for sepsis vs septic shock.      (27 Feb 2022 22:20)    ==========    INTERVAL HISTORY:  Patient presenting with AMS- infectious workup sent and found to have serratia bacteremia x4. Initially presenting with hypercapnic resp failure requiring BiPAP  CTH done for AMS x3- chronic microvascular changes. CT C/A/P done to identify source- none definitively on scan. GOC discussion had with palliative team and patient made DNR/DNI 3/3.     SUBJECTIVE:  Unable to ask 2/2 pt AMS        OBJECTIVE:     =============================  Vital Signs Last 24 Hrs  T(C): 37 (07 Mar 2022 16:00), Max: 37.4 (06 Mar 2022 20:00)  T(F): 98.6 (07 Mar 2022 16:00), Max: 99.3 (06 Mar 2022 20:00)  HR: 80 (07 Mar 2022 16:00) (72 - 92)  RR: 20 (07 Mar 2022 16:00) (20 - 36)  SpO2: 98% (07 Mar 2022 16:00) (96% - 98%)  ICU Vital Signs Last 24 Hrs  T(C): 37 (07 Mar 2022 16:00), Max: 37.4 (06 Mar 2022 20:00)  T(F): 98.6 (07 Mar 2022 16:00), Max: 99.3 (06 Mar 2022 20:00)  HR: 80 (07 Mar 2022 16:00) (72 - 92)  ABP: 102/77 (07 Mar 2022 16:00) (81/64 - 104/76)  ABP(mean): 88 (07 Mar 2022 16:00) (72 - 89)  RR: 20 (07 Mar 2022 16:00) (20 - 36)  SpO2: 98% (07 Mar 2022 16:00) (96% - 98%)    ==============================      ============================================  MEDICATIONS  (STANDING):  cefepime   IVPB 1000 milliGRAM(s) IV Intermittent every 12 hours  chlorhexidine 2% Cloths 1 Application(s) Topical <User Schedule>  cholecalciferol 2000 Unit(s) Oral daily  insulin lispro (ADMELOG) corrective regimen sliding scale   SubCutaneous every 6 hours  methimazole 10 milliGRAM(s) Oral daily  mexiletine 150 milliGRAM(s) Oral every 8 hours  midodrine 20 milliGRAM(s) Oral every 8 hours  multivitamin 1 Tablet(s) Oral daily  pantoprazole  Injectable 40 milliGRAM(s) IV Push every 12 hours  senna 2 Tablet(s) Oral at bedtime  sertraline 100 milliGRAM(s) Oral daily    MEDICATIONS  (PRN):    ============================================        ==========  TELEMETRY: Freq NSVT, NSR in 80s   ==========    ==========  FOLLOW UP:  - f/u GOC discussion with family in regards to PEG placement  - Eventual PICC placement prior to d/c for 6wk course of IV ABX  - f/u with social work for rehab placement   ==========  ====================ASSESSMENT ==============  65M PMH LVAD, recurrent serratia bacteremia, pneumonia, intubated/trach x2 cycles, chronic gall bladder disease s/p percutaneous cholecystectomy, SMA ischemia s/p stent, cachexia who presented from [facility] for septic shock    Plan:  ====================== NEUROLOGY=====================  AMS  - per nursing home staff pt has been altered for one week  - pt AAOx0, not verbally responding to staff  - continue to monitor neuro status  - CTH at OSH negative for acute pathology   - 2/28 CTH: Mild to mod white matter microvascular ischemic disease. No acute pathology   - 3/3 CTH: Chronic microvascular changes, no acute pathology   - unable to get Brain MRI 2/2 LVAD.   - vEEG: generalized slowing and no evidence for focal slowing, epileptiform discharges, or seizures. Now d/c   - c/w sertraline   - Neuro following, recs appreciated. Will send ammonia and B1 levels. Start Vit D       ==================== RESPIRATORY======================  Hypercapnic Respiratory failure   - Pt hypercarbic at OSH, placed on BiPAP, now weaned off  - appears comfortable now on room air   - continue to monitor respiratory status     Hx trach w/ stoma  - Pt trach x2 cycles in past  - most recently in setting of COVID PNA in 02/2022  - Pt decannulated after admission  - purulent drainage in stoma site, cultured pending results  - ENT states no sign of infection at Stoma site- Plan for possible TE fistula closure when pt is stable as per ENT. Spoke to ENT 3/7 in regards to fistula closure and states will do as an outpatient once off supplemental oxygen for several weeks. No plan for any closure while inpatient.        ====================CARDIOVASCULAR==================  ACC/AHA stage D systolic heart failure s/p LVAD + Distributive Shock   - pt initially appeared hypovolemic on admission  - on and off pressor support w/ Vaso for MAP goal > 70   -  c/w Midodrine 20 TID to maintain MAP >70   - central sat sent from intro 81.8 with negative lactate x4    Ventricular Tachycardia  - pt has history of VT on previous admissions, takes home mexiletine  - significant NSVT and VT seen on tele in CICU overnight and this AM  - s/p lido gtt- weaned off 3/5 afternoon   - restarted home Mexitil 150 TID   - monitor on tele, monitor lytes   - EP following     Aortic thrombus  - enlarging aortic thrombus on CT CAP. patient has a history of recurrent GI bleeds and was unable to tolerate AC outpatient. Therefore risk/benefit does not favor AC.  - Monitor off AC at this time     ===================HEMATOLOGIC/ONC ===================  Anemia  - Hgb on admission 7.4 with subsequent drop to 6.6  - s/p 2u PRBC 2/28. Last pRBC transfused 3/6 at 3am. Will check cbc q12-24  - of note, pt has hx of GI bleeds  - no blood noted in BM, continue to monitor   - transfuse as needed if hgb <7    ===================== RENAL =========================  RASHAWN, resolved   - serum Cr downtrended and now stable at baseline   - continue to monitor SCr, BUN, lytes, and I&Os  - replete lytes prn with goal K 4-4.5 and mg >2    ==================== GASTROINTESTINAL===================  Hx GI bleed in past req multiple transfusions   - no overt bleeding noted on this admission   - monitor BM for signs of blood. h/h stable for past 24 hrs without any blood noted in BM  - c/w PPI  - Bowel regimen with Senna    Diet  - c/w Jevity 55cc/hr as tolerated  - Consider PEG placement, discuss GOC w/ Fam      =======================    ENDOCRINE  =====================  Hx Hyperthyroidism   - continue home methimazole     Hyperglycemia   -maintain q6h ISS while on continuous enteral feeds       ========================INFECTIOUS DISEASE================  Septic shock  - pt got zosyn and vancomycin x1 at OSH. switched from zosyn to cefepime. s/p vanco-, d/c yesterday as BCX with serratia.   - BCX 2/28 gram neg rods x4 bottles-- growing serratia sensitive to Cefepime/Erta   - f/u stoma culture sent 3/5- Sputum culture neg   - 3/2 BCX NGTD x2, pending 3/6 BCX  - CT C/A/P 3/3 showing incr size of abd aortic thrombus (infected thrombus?)  - c/w Cefepime 1g q12h 2/28- , may need 4-6wk of therapy if this is intravascular infection. PICC placement needed- request put in for today       GOC  - DNR/DNI  - Consider PEG placement, discuss GOC w/ Fam  - Palliative following, recs appreciated         Katheryn Cuba PA-C RICKY HAMPTON  MRN-[id number]      Accepting Hospitalist: Dr. Cate Hoffman   Signout given to MAR 3/8 & Floor ACP Kayla   ==========  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 s/p HM2 LVAD in 9/2017 at  (due to severe PAD) with TV ring, multiple GI bleeds, thus maintained off AC, CKD 3prior COVID-19 infection in 4/2020, recurrent syncope post LVAD s/p ILR, s/p prolonged complex hospitalization from 6/14-11/16/2021 initially admitted with abdominal pain complicated by GIB, respiratory failure s/p trach, multiple infections, s/p cholecystotomy tube and SMA stent 10/2021, ultimately discharged to [facility] rehab and then returned to [facility] for trach decannulation 12/2, readmitted in 2/2022 with recurrent COVID-19 infection, initially in distributive shock. Blood cultures were also positive for serratia marcescens which he has had in the past.     Pt was transferred to [facility] CICU from [facility]. He was sent to their ED from a rehab center due to AMS and tachycardia. At [facility], they noted a WBC greater than 20, tachycardia and hypercarbia on ABG. Pt was placed on BiPAP and transferred to [facility] CICU for concern for sepsis vs septic shock.      (27 Feb 2022 22:20)    ==========    INTERVAL HISTORY:  Patient presenting with AMS- infectious workup sent and found to have serratia bacteremia x4. Initially presenting with hypercapnic resp failure requiring BiPAP  CTH done for AMS x3- chronic microvascular changes. CT C/A/P done to identify source- none definitively on scan. [facility] discussion had with palliative team and patient made DNR/DNI 3/3.     SUBJECTIVE:  Unable to ask 2/2 pt AMS        OBJECTIVE:     =============================  Vital Signs Last 24 Hrs  T(C): 37 (07 Mar 2022 16:00), Max: 37.4 (06 Mar 2022 20:00)  T(F): 98.6 (07 Mar 2022 16:00), Max: 99.3 (06 Mar 2022 20:00)  HR: 80 (07 Mar 2022 16:00) (72 - 92)  RR: 20 (07 Mar 2022 16:00) (20 - 36)  SpO2: 98% (07 Mar 2022 16:00) (96% - 98%)  ICU Vital Signs Last 24 Hrs  T(C): 37 (07 Mar 2022 16:00), Max: 37.4 (06 Mar 2022 20:00)  T(F): 98.6 (07 Mar 2022 16:00), Max: 99.3 (06 Mar 2022 20:00)  HR: 80 (07 Mar 2022 16:00) (72 - 92)  ABP: 102/77 (07 Mar 2022 16:00) (81/64 - 104/76)  ABP(mean): 88 (07 Mar 2022 16:00) (72 - 89)  RR: 20 (07 Mar 2022 16:00) (20 - 36)  SpO2: 98% (07 Mar 2022 16:00) (96% - 98%)    ==============================      ============================================  MEDICATIONS  (STANDING):  cefepime   IVPB 1000 milliGRAM(s) IV Intermittent every 12 hours  chlorhexidine 2% Cloths 1 Application(s) Topical <User Schedule>  cholecalciferol 2000 Unit(s) Oral daily  insulin lispro (ADMELOG) corrective regimen sliding scale   SubCutaneous every 6 hours  methimazole 10 milliGRAM(s) Oral daily  mexiletine 150 milliGRAM(s) Oral every 8 hours  midodrine 20 milliGRAM(s) Oral every 8 hours  multivitamin 1 Tablet(s) Oral daily  pantoprazole  Injectable 40 milliGRAM(s) IV Push every 12 hours  senna 2 Tablet(s) Oral at bedtime  sertraline 100 milliGRAM(s) Oral daily    MEDICATIONS  (PRN):    ============================================        ==========  TELEMETRY: Freq NSVT, NSR in 80s   ==========    ==========  FOLLOW UP:  - f/u GOC discussion with family in regards to PEG placement  - Eventual PICC placement prior to d/c for 6wk course of IV ABX  - f/u with social work for rehab placement   ==========  ====================ASSESSMENT ==============  65M PMH LVAD, recurrent serratia bacteremia, pneumonia, intubated/trach x2 cycles, chronic gall bladder disease s/p percutaneous cholecystectomy, SMA ischemia s/p stent, cachexia who presented from [facility] for septic shock    Plan:  ====================== NEUROLOGY=====================  AMS  - per nursing home staff pt has been altered for one week  - pt AAOx0, not verbally responding to staff  - continue to monitor neuro status  - CTH at OSH negative for acute pathology   - 2/28 CTH: Mild to mod white matter microvascular ischemic disease. No acute pathology   - 3/3 CTH: Chronic microvascular changes, no acute pathology   - unable to get Brain MRI 2/2 LVAD.   - vEEG: generalized slowing and no evidence for focal slowing, epileptiform discharges, or seizures. Now d/c   - c/w sertraline   - Neuro following, recs appreciated. Will send ammonia and B1 levels. Start Vit D       ==================== RESPIRATORY======================  Hypercapnic Respiratory failure   - Pt hypercarbic at OSH, placed on BiPAP, now weaned off  - appears comfortable now on room air   - continue to monitor respiratory status     Hx trach w/ stoma  - Pt trach x2 cycles in past  - most recently in setting of COVID PNA in 02/2022  - Pt decannulated after admission  - purulent drainage in stoma site, cultured pending results  - ENT states no sign of infection at Stoma site- Plan for possible TE fistula closure when pt is stable as per ENT. Spoke to ENT 3/7 in regards to fistula closure and states will do as an outpatient once off supplemental oxygen for several weeks. No plan for any closure while inpatient.        ====================CARDIOVASCULAR==================  ACC/AHA stage D systolic heart failure s/p LVAD + Distributive Shock   - pt initially appeared hypovolemic on admission  - on and off pressor support w/ Vaso for MAP goal > 70   -  c/w Midodrine 20 TID to maintain MAP >70   - central sat sent from intro 81.8 with negative lactate x4    Ventricular Tachycardia  - pt has history of VT on previous admissions, takes home mexiletine  - significant NSVT and VT seen on tele in CICU overnight and this AM  - s/p lido gtt- weaned off 3/5 afternoon   - restarted home Mexitil 150 TID   - monitor on tele, monitor lytes   - EP following     Aortic thrombus  - enlarging aortic thrombus on CT CAP. patient has a history of recurrent GI bleeds and was unable to tolerate AC outpatient. Therefore risk/benefit does not favor AC.  - Monitor off AC at this time     ===================HEMATOLOGIC/ONC ===================  Anemia  - Hgb on admission 7.4 with subsequent drop to 6.6  - s/p 2u PRBC 2/28. Last pRBC transfused 3/6 at 3am. Will check cbc q12-24  - of note, pt has hx of GI bleeds  - no blood noted in BM, continue to monitor   - transfuse as needed if hgb <7    ===================== RENAL =========================  RASHAWN, resolved   - serum Cr downtrended and now stable at baseline   - continue to monitor SCr, BUN, lytes, and I&Os  - replete lytes prn with goal K 4-4.5 and mg >2    ==================== GASTROINTESTINAL===================  Hx GI bleed in past req multiple transfusions   - no overt bleeding noted on this admission   - monitor BM for signs of blood. h/h stable for past 24 hrs without any blood noted in BM  - c/w PPI  - Bowel regimen with Senna    Diet  - c/w Jevity 55cc/hr as tolerated  - Consider PEG placement, discuss GOC w/ Fam      =======================    ENDOCRINE  =====================  Hx Hyperthyroidism   - continue home methimazole     Hyperglycemia   -maintain q6h ISS while on continuous enteral feeds       ========================INFECTIOUS DISEASE================  Septic shock  - pt got zosyn and vancomycin x1 at OSH. switched from zosyn to cefepime. s/p vanco-, d/c yesterday as BCX with serratia.   - BCX 2/28 gram neg rods x4 bottles-- growing serratia sensitive to Cefepime/Erta   - f/u stoma culture sent 3/5- Sputum culture neg   - 3/2 BCX NGTD x2, pending 3/6 BCX  - CT C/A/P 3/3 showing incr size of abd aortic thrombus (infected thrombus?)  - c/w Cefepime 1g q12h 2/28- , may need 4-6wk of therapy if this is intravascular infection. PICC placement needed- request put in for today       GOC  - DNR/DNI  - Consider PEG placement, discuss GOC w/ Fam  - Palliative following, recs appreciated         Katheryn Cuba PA-C

## 2022-03-07 NOTE — PROGRESS NOTE ADULT - ASSESSMENT
Assessment and Recommendation:   · Assessment	  Assessment and recommendation :  Acute hypercapnic respiratory Failure weaned of  positive pressure non invasive ventilator   Septic shock off vasopressin  Bacteremia with serratia marcescens  Severe anemia with low H/H   S/P transfuse one unit of Packed RBCs   Severe ischemic cardiomyopathy   ACC/AHA stage D systolic heart failure  Peripheral vascular Disease   PAF on no ACO   H/O of repeated GI bleeding   S/P mesenteric ischemia   GERD on reglan   S/P HM2 LVAD , VT on Mexiletine   hyperthyroidism on  methimazole   severe protein caloric malnutrition   severe neuropathy   Major depression   S/P tracheostomy X2  pan culture blood and urine done   Continue Antibiotic cefepime   GI prophylaxis   case discussed with CICU   patient is DNR/DNI

## 2022-03-07 NOTE — PROGRESS NOTE ADULT - SUBJECTIVE AND OBJECTIVE BOX
RICKY HAMPTON  MRN-[id number]  Patient is a 66y old  Male who presents with a chief complaint of Septic shock (06 Mar 2022 20:31)    HPI:  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 s/p HM2 LVAD in 9/2017 at  (due to severe PAD) with TV ring, multiple GI bleeds, thus maintained off AC, CKD 3prior COVID-19 infection in 4/2020, recurrent syncope post LVAD s/p ILR, s/p prolonged complex hospitalization from 6/14-11/16/2021 initially admitted with abdominal pain complicated by GIB, respiratory failure s/p trach, multiple infections, s/p cholecystotomy tube and SMA stent 10/2021, ultimately discharged to [facility] rehab and then returned to [facility] for trach decannulation 12/2, readmitted in 2/2022 with recurrent COVID-19 infection, initially in distributive shock. Blood cultures were also positive for serratia marcescens which he has had in the past.     Pt was transferred to [facility] CICU from [facility]. He was sent to their ED from a rehab center due to AMS and tachycardia. At [facility], they noted a WBC greater than 20, tachycardia and hypercarbia on ABG. Pt was placed on BiPAP and transferred to [facility] CICU for concern for sepsis vs septic shock.      (27 Feb 2022 22:20)      Hospital course:    Today:    Physical Exam:  Vital Signs Last 24 Hrs  T(C): 36.7 (07 Mar 2022 03:00), Max: 37.4 (06 Mar 2022 20:00)  T(F): 98.1 (07 Mar 2022 03:00), Max: 99.3 (06 Mar 2022 20:00)  HR: 75 (07 Mar 2022 06:00) (72 - 99)  BP: --  BP(mean): --  RR: 24 (07 Mar 2022 06:00) (22 - 36)  SpO2: 97% (07 Mar 2022 06:00) (96% - 99%)    PHYSICAL EXAM:  Constitutional: Patient laying in bed, NAD  Head: Atraumatic, normocephalic  Eyes: No scleral icterus. PERRLA. EOMI  ENMT: Moist mucous membrane. Uvula midline  Neck: Supple, No JVD  Respiratory: CTA B/L. No wheezes, rales or rhonchi   Cardiovascular: S1/S2. No murmurs, rubs, or gallops.  Gastrointestinal: Nondistended, BSx4, soft, nontender.  Extremities: Moves all extremities. Warm, no edema, nontender  Vascular: 2+ DP/PT pulses B/L   Neurological: A&Ox3. Follows commands. No focal deficits.   Skin: No rashes on exposed skin     ============================I/O===========================   I&O's Detail    05 Mar 2022 07:01  -  06 Mar 2022 07:00  --------------------------------------------------------  IN:    Albumin 5%  - 250 mL: 500 mL    Enteral Tube Flush: 100 mL    IV PiggyBack: 50 mL    IV PiggyBack: 125 mL    Jevity 1.2: 1320 mL    Lidocaine: 30 mL    PRBCs (Packed Red Blood Cells): 350 mL    Vasopressin: 4.8 mL    Vasopressin: 50.4 mL  Total IN: 2530.2 mL    OUT:    Drain (mL): 10 mL    Voided (mL): 1095 mL  Total OUT: 1105 mL    Total NET: 1425.2 mL      06 Mar 2022 07:01  -  07 Mar 2022 06:39  --------------------------------------------------------  IN:    Enteral Tube Flush: 40 mL    Jevity 1.2: 1155 mL    Lactated Ringers: 950 mL  Total IN: 2145 mL    OUT:    Indwelling Catheter - Urethral (mL): 1610 mL    Vasopressin: 0 mL  Total OUT: 1610 mL    Total NET: 535 mL        ============================ LABS =========================                        9.3    14.57 )-----------( 195      ( 07 Mar 2022 05:00 )             29.4     03-07    135  |  103  |  14  ----------------------------<  147<H>  4.2   |  24  |  0.97    Ca    8.4      07 Mar 2022 05:00  Phos  2.4     03-07  Mg     1.8     03-07    TPro  7.4  /  Alb  2.7<L>  /  TBili  0.2  /  DBili  x   /  AST  24  /  ALT  16  /  AlkPhos  132<H>  03-07    LIVER FUNCTIONS - ( 07 Mar 2022 05:00 )  Alb: 2.7 g/dL / Pro: 7.4 g/dL / ALK PHOS: 132 U/L / ALT: 16 U/L / AST: 24 U/L / GGT: x           PTT - ( 07 Mar 2022 05:01 )  PTT:26.8 sec      ======================Micro/Rad/Cardio=================  Culture: Reviewed   CXR: Reviewed  Echo:Reviewed  ======================================================  PAST MEDICAL & SURGICAL HISTORY:  CHF (congestive heart failure)    CAD (coronary artery disease)    Depression    Pleural effusion    History of 2019 novel coronavirus disease (COVID-19)  april 2020    Hemorrhoids    Bleeding hemorrhoids    Peripheral arterial disease    Claudication    BPH with urinary obstruction    ACC/AHA stage D systolic heart failure    Anticoagulation goal of INR 2.0 to 2.5    Falls    Clavicle fracture    CKD (chronic kidney disease), stage III    Iron deficiency anemia    H/O epistaxis    Vertigo    GI bleed    S/P TVR (tricuspid valve repair)    S/P ventricular assist device    S/P endoscopy    H/O tracheostomy      ====================ASSESSMENT ==============  CNS:  RES:  CVS:  Hem:  Sabino:  GI:  Endo:  ID:  Skin  Plan:  ====================== NEUROLOGY=====================  AMS  - per nursing home staff pt has been altered for one week  - pt AAOx0, not verbally responding to staff  - continue to monitor neuro status  - CTH at OSH negative for acute pathology   - 2/28 CTH: Mild to mod white matter microvascular ischemic disease. No acute pathology   - 3/3 CTH: Chronic microvascular changes, no acute pathology   - unable to get Brain MRI 2/2 LVAD. consider repeat CTH in a few days if AMS unchanges  - vEEG neg for seizure activity   - c/w sertraline       ==================== RESPIRATORY======================  Hypercpnic Respiratory failure   - Pt hypercarbic at OSH, placed on BiPAP, now weaned off  - appears comfortable now on room air   - continue to monitor respiratory status     Hx trach w/ stoma  - Pt trach x2 cycles in past  - most recently in setting of COVID PNA in 02/2022  - Pt decannulated after admission  - purulent drainage in stoma site, cultured pending results  - ENT states no sign of infection at Stoma site- Plan for possible TE fistula closure when pt is stable as per ENT       ====================CARDIOVASCULAR==================  ACC/AHA stage D systolic heart failure s/p LVAD + Distributive Shock   - pt initially appeared hypovolemic on admission  - on and off pressor support w/ Vaso for MAP goal > 70   -  c/w Midodrine 20 TID to maintain MAP >70   - central sat sent from intro 81.8 with negative lactate x4    Ventricular Tachycardia  - pt has history of VT on previous admissions, takes home mexiletine  - significant NSVT and VT seen on tele in CICU overnight and this AM  - s/p lido gtt   - restarted home Mexitil 150 TID   - monitor on tele, monitor lytes   - EP following     ===================HEMATOLOGIC/ONC ===================  Anemia  - Hgb on admission 7.4 with subsequent drop to 6.6  - s/p 2u PRBC 2/28. Now h/h has been stable. Will check cbc q12-24  - of note, pt has hx of GI bleeds  - no blood noted in BM, continue to monitor   - transfuse as needed if hgb <7    ===================== RENAL =========================  RASHAWN, resolved   - serum Cr downtrended and now stable at baseline   - continue to monitor SCr, BUN, lytes, and I&Os  - replete lytes prn with goal K 4-4.5 and mg >2    ==================== GASTROINTESTINAL===================  Hx GI bleed in past req multiple transfusions   - no overt bleeding noted on this admission   - monitor BM for signs of blood. h/h stable for past 24 hrs without any blood noted in BM  - c/w PPI  - Bowel regimen with Senna    Diet  - c/w Jevity 55cc/hr as tolerated      =======================    ENDOCRINE  =====================  Hx Hyperthyroidism   - continue home methimazole     Hyperglycemia   -maintain q6h ISS while on continuous enteral feeds       ========================INFECTIOUS DISEASE================  Septic shock  - pt got zosyn and vancomycin x1 at OSH. switched from zosyn to cefepime. s/p vanco-, d/c yesterday as BCX with serratia.   - BCX 2/28 gram neg rods x4 bottles-- growing serratia sensitive to Cefepime/Erta   - f/u stoma culture sent 3/5- Sputum culture neg   - 3/2 BCX NGTD x2, pending 3/6 BCX  - CT C/A/P 3/3 showing incr size of abd aortic thrombus (infected thrombus?)  - c/w Cefepime 1g q12h 2/28- , may need 4-6wk of therapy if this is intravascular infection.           Patient requires continuous monitoring with bedside rhythm monitoring, arterial line, pulse oximetry, ventilator monitoring and intermittent blood gas analysis.  Care plan discussed with ICU care team.  Patient remained critical; required more than usual post op care; I have spent 35 minutes providing non-routine post op care, in addition to initial critical time provided by CICU attending Dr. Machado, re-evaluated multiple times during the day.         RICKY HAMPTON  MRN-[id number]  Patient is a 66y old  Male who presents with a chief complaint of Septic shock (06 Mar 2022 20:31)    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 s/p HM2 LVAD in 9/2017 at  (due to severe PAD) with TV ring, multiple GI bleeds, thus maintained off AC, CKD 3prior COVID-19 infection in 4/2020, recurrent syncope post LVAD s/p ILR, s/p prolonged complex hospitalization from 6/14-11/16/2021 initially admitted with abdominal pain complicated by GIB, respiratory failure s/p trach, multiple infections, s/p cholecystotomy tube and SMA stent 10/2021, ultimately discharged to [facility] rehab and then returned to [facility] for trach decannulation 12/2, readmitted in 2/2022 with recurrent COVID-19 infection, initially in distributive shock. Blood cultures were also positive for serratia marcescens which he has had in the past.     Pt was transferred to [facility] CICU from [facility]. He was sent to their ED from a rehab center due to AMS and tachycardia. At [facility], they noted a WBC greater than 20, tachycardia and hypercarbia on ABG. Pt was placed on BiPAP and transferred to [facility] CICU for concern for sepsis vs septic shock.      (27 Feb 2022 22:20)      Hospital course:    Today:    Physical Exam:  Vital Signs Last 24 Hrs  T(C): 36.7 (07 Mar 2022 03:00), Max: 37.4 (06 Mar 2022 20:00)  T(F): 98.1 (07 Mar 2022 03:00), Max: 99.3 (06 Mar 2022 20:00)  HR: 75 (07 Mar 2022 06:00) (72 - 99)  BP: --  BP(mean): --  RR: 24 (07 Mar 2022 06:00) (22 - 36)  SpO2: 97% (07 Mar 2022 06:00) (96% - 99%)    PHYSICAL EXAM:  Constitutional: Patient laying in bed, NAD  Head: Atraumatic, normocephalic  Eyes: No scleral icterus. PERRLA. EOMI  ENMT: Moist mucous membrane. Uvula midline  Neck: Supple, No JVD  Respiratory: CTA B/L. No wheezes, rales or rhonchi   Cardiovascular: S1/S2. No murmurs, rubs, or gallops.  Gastrointestinal: Nondistended, BSx4, soft, nontender.  Extremities: Moves all extremities. Warm, no edema, nontender  Vascular: 2+ DP/PT pulses B/L   Neurological: A&Ox3. Follows commands. No focal deficits.   Skin: No rashes on exposed skin     ============================I/O===========================   I&O's Detail    05 Mar 2022 07:01  -  06 Mar 2022 07:00  --------------------------------------------------------  IN:    Albumin 5%  - 250 mL: 500 mL    Enteral Tube Flush: 100 mL    IV PiggyBack: 50 mL    IV PiggyBack: 125 mL    Jevity 1.2: 1320 mL    Lidocaine: 30 mL    PRBCs (Packed Red Blood Cells): 350 mL    Vasopressin: 4.8 mL    Vasopressin: 50.4 mL  Total IN: 2530.2 mL    OUT:    Drain (mL): 10 mL    Voided (mL): 1095 mL  Total OUT: 1105 mL    Total NET: 1425.2 mL      06 Mar 2022 07:01  -  07 Mar 2022 06:39  --------------------------------------------------------  IN:    Enteral Tube Flush: 40 mL    Jevity 1.2: 1155 mL    Lactated Ringers: 950 mL  Total IN: 2145 mL    OUT:    Indwelling Catheter - Urethral (mL): 1610 mL    Vasopressin: 0 mL  Total OUT: 1610 mL    Total NET: 535 mL        ============================ LABS =========================                        9.3    14.57 )-----------( 195      ( 07 Mar 2022 05:00 )             29.4     03-07    135  |  103  |  14  ----------------------------<  147<H>  4.2   |  24  |  0.97    Ca    8.4      07 Mar 2022 05:00  Phos  2.4     03-07  Mg     1.8     03-07    TPro  7.4  /  Alb  2.7<L>  /  TBili  0.2  /  DBili  x   /  AST  24  /  ALT  16  /  AlkPhos  132<H>  03-07    LIVER FUNCTIONS - ( 07 Mar 2022 05:00 )  Alb: 2.7 g/dL / Pro: 7.4 g/dL / ALK PHOS: 132 U/L / ALT: 16 U/L / AST: 24 U/L / GGT: x           PTT - ( 07 Mar 2022 05:01 )  PTT:26.8 sec      ======================Micro/Rad/Cardio=================  Culture: Reviewed   CXR: Reviewed  Echo:Reviewed  ======================================================  PAST MEDICAL & SURGICAL HISTORY:  CHF (congestive heart failure)    CAD (coronary artery disease)    Depression    Pleural effusion    History of 2019 novel coronavirus disease (COVID-19)  april 2020    Hemorrhoids    Bleeding hemorrhoids    Peripheral arterial disease    Claudication    BPH with urinary obstruction    ACC/AHA stage D systolic heart failure    Anticoagulation goal of INR 2.0 to 2.5    Falls    Clavicle fracture    CKD (chronic kidney disease), stage III    Iron deficiency anemia    H/O epistaxis    Vertigo    GI bleed    S/P TVR (tricuspid valve repair)    S/P ventricular assist device    S/P endoscopy    H/O tracheostomy      ====================ASSESSMENT ==============  65M PMH LVAD, recurrent serratia bacteremia, pneumonia, intubated/trach x2 cycles, chronic gall bladder disease s/p percutaneous cholecystectomy, SMA ischemia s/p stent, cachexia who presented from [facility] for septic shock    Plan:  ====================== NEUROLOGY=====================  AMS  - per nursing home staff pt has been altered for one week  - pt AAOx0, not verbally responding to staff  - continue to monitor neuro status  - CTH at OSH negative for acute pathology   - 2/28 CTH: Mild to mod white matter microvascular ischemic disease. No acute pathology   - 3/3 CTH: Chronic microvascular changes, no acute pathology   - unable to get Brain MRI 2/2 LVAD.   - vEEG: generalized slowing and no evidence for focal slowing, epileptiform discharges, or seizures. Now d/c   - c/w sertraline   - Neuro following, recs appreciated       ==================== RESPIRATORY======================  Hypercapnic Respiratory failure   - Pt hypercarbic at OSH, placed on BiPAP, now weaned off  - appears comfortable now on room air   - continue to monitor respiratory status     Hx trach w/ stoma  - Pt trach x2 cycles in past  - most recently in setting of COVID PNA in 02/2022  - Pt decannulated after admission  - purulent drainage in stoma site, cultured pending results  - ENT states no sign of infection at Stoma site- Plan for possible TE fistula closure when pt is stable as per ENT       ====================CARDIOVASCULAR==================  ACC/AHA stage D systolic heart failure s/p LVAD + Distributive Shock   - pt initially appeared hypovolemic on admission  - on and off pressor support w/ Vaso for MAP goal > 70   -  c/w Midodrine 20 TID to maintain MAP >70   - central sat sent from intro 81.8 with negative lactate x4    Ventricular Tachycardia  - pt has history of VT on previous admissions, takes home mexiletine  - significant NSVT and VT seen on tele in CICU overnight and this AM  - s/p lido gtt   - restarted home Mexitil 150 TID   - monitor on tele, monitor lytes   - EP following     Aortic thrombus  - enlarging aortic thrombus on CT CAP. patient has a history of recurrent GI bleeds and was unable to tolerate AC outpatient. Therefore risk/benefit does not favor AC.  - Monitor off AC at this time     ===================HEMATOLOGIC/ONC ===================  Anemia  - Hgb on admission 7.4 with subsequent drop to 6.6  - s/p 2u PRBC 2/28. Now h/h has been stable. Will check cbc q12-24  - of note, pt has hx of GI bleeds  - no blood noted in BM, continue to monitor   - transfuse as needed if hgb <7    ===================== RENAL =========================  RASHAWN, resolved   - serum Cr downtrended and now stable at baseline   - continue to monitor SCr, BUN, lytes, and I&Os  - replete lytes prn with goal K 4-4.5 and mg >2    ==================== GASTROINTESTINAL===================  Hx GI bleed in past req multiple transfusions   - no overt bleeding noted on this admission   - monitor BM for signs of blood. h/h stable for past 24 hrs without any blood noted in BM  - c/w PPI  - Bowel regimen with Senna    Diet  - c/w Jevity 55cc/hr as tolerated      =======================    ENDOCRINE  =====================  Hx Hyperthyroidism   - continue home methimazole     Hyperglycemia   -maintain q6h ISS while on continuous enteral feeds       ========================INFECTIOUS DISEASE================  Septic shock  - pt got zosyn and vancomycin x1 at OSH. switched from zosyn to cefepime. s/p vanco-, d/c yesterday as BCX with serratia.   - BCX 2/28 gram neg rods x4 bottles-- growing serratia sensitive to Cefepime/Erta   - f/u stoma culture sent 3/5- Sputum culture neg   - 3/2 BCX NGTD x2, pending 3/6 BCX  - CT C/A/P 3/3 showing incr size of abd aortic thrombus (infected thrombus?)  - c/w Cefepime 1g q12h 2/28- , may need 4-6wk of therapy if this is intravascular infection.           Patient requires continuous monitoring with bedside rhythm monitoring, arterial line, pulse oximetry, ventilator monitoring and intermittent blood gas analysis.  Care plan discussed with ICU care team.  Patient remained critical; required more than usual post op care; I have spent 35 minutes providing non-routine post op care, in addition to initial critical time provided by CICU attending Dr. Machado, re-evaluated multiple times during the day.         RICKY HAMPTON  MRN-[id number]  Patient is a 66y old  Male who presents with a chief complaint of Septic shock (06 Mar 2022 20:31)    HPI:  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 s/p HM2 LVAD in 9/2017 at  (due to severe PAD) with TV ring, multiple GI bleeds, thus maintained off AC, CKD 3prior COVID-19 infection in 4/2020, recurrent syncope post LVAD s/p ILR, s/p prolonged complex hospitalization from 6/14-11/16/2021 initially admitted with abdominal pain complicated by GIB, respiratory failure s/p trach, multiple infections, s/p cholecystotomy tube and SMA stent 10/2021, ultimately discharged to [facility] rehab and then returned to [facility] for trach decannulation 12/2, readmitted in 2/2022 with recurrent COVID-19 infection, initially in distributive shock. Blood cultures were also positive for serratia marcescens which he has had in the past.     Pt was transferred to [facility] CICU from [facility]. He was sent to their ED from a rehab center due to AMS and tachycardia. At [facility], they noted a WBC greater than 20, tachycardia and hypercarbia on ABG. Pt was placed on BiPAP and transferred to [facility] CICU for concern for sepsis vs septic shock.      (27 Feb 2022 22:20)      Hospital course:    Today:    Physical Exam:  Vital Signs Last 24 Hrs  T(C): 36.7 (07 Mar 2022 03:00), Max: 37.4 (06 Mar 2022 20:00)  T(F): 98.1 (07 Mar 2022 03:00), Max: 99.3 (06 Mar 2022 20:00)  HR: 75 (07 Mar 2022 06:00) (72 - 99)  BP: --  BP(mean): --  RR: 24 (07 Mar 2022 06:00) (22 - 36)  SpO2: 97% (07 Mar 2022 06:00) (96% - 99%)    PHYSICAL EXAM:  Constitutional: Patient laying in bed, NAD  Head: Atraumatic, normocephalic  Eyes: No scleral icterus. PERRLA. EOMI  ENMT: Moist mucous membrane. Uvula midline  Neck: Supple, No JVD  Respiratory: CTA B/L. No wheezes, rales or rhonchi   Cardiovascular: S1/S2. No murmurs, rubs, or gallops.  Gastrointestinal: Nondistended, BSx4, soft, nontender.  Extremities: Moves all extremities. Warm, no edema, nontender  Vascular: 2+ DP/PT pulses B/L   Neurological: A&Ox3. Follows commands. No focal deficits.   Skin: No rashes on exposed skin     ============================I/O===========================   I&O's Detail    05 Mar 2022 07:01  -  06 Mar 2022 07:00  --------------------------------------------------------  IN:    Albumin 5%  - 250 mL: 500 mL    Enteral Tube Flush: 100 mL    IV PiggyBack: 50 mL    IV PiggyBack: 125 mL    Jevity 1.2: 1320 mL    Lidocaine: 30 mL    PRBCs (Packed Red Blood Cells): 350 mL    Vasopressin: 4.8 mL    Vasopressin: 50.4 mL  Total IN: 2530.2 mL    OUT:    Drain (mL): 10 mL    Voided (mL): 1095 mL  Total OUT: 1105 mL    Total NET: 1425.2 mL      06 Mar 2022 07:01  -  07 Mar 2022 06:39  --------------------------------------------------------  IN:    Enteral Tube Flush: 40 mL    Jevity 1.2: 1155 mL    Lactated Ringers: 950 mL  Total IN: 2145 mL    OUT:    Indwelling Catheter - Urethral (mL): 1610 mL    Vasopressin: 0 mL  Total OUT: 1610 mL    Total NET: 535 mL        ============================ LABS =========================                        9.3    14.57 )-----------( 195      ( 07 Mar 2022 05:00 )             29.4     03-07    135  |  103  |  14  ----------------------------<  147<H>  4.2   |  24  |  0.97    Ca    8.4      07 Mar 2022 05:00  Phos  2.4     03-07  Mg     1.8     03-07    TPro  7.4  /  Alb  2.7<L>  /  TBili  0.2  /  DBili  x   /  AST  24  /  ALT  16  /  AlkPhos  132<H>  03-07    LIVER FUNCTIONS - ( 07 Mar 2022 05:00 )  Alb: 2.7 g/dL / Pro: 7.4 g/dL / ALK PHOS: 132 U/L / ALT: 16 U/L / AST: 24 U/L / GGT: x           PTT - ( 07 Mar 2022 05:01 )  PTT:26.8 sec      ======================Micro/Rad/Cardio=================  Culture: Reviewed   CXR: Reviewed  Echo:Reviewed  ======================================================  PAST MEDICAL & SURGICAL HISTORY:  CHF (congestive heart failure)    CAD (coronary artery disease)    Depression    Pleural effusion    History of 2019 novel coronavirus disease (COVID-19)  april 2020    Hemorrhoids    Bleeding hemorrhoids    Peripheral arterial disease    Claudication    BPH with urinary obstruction    ACC/AHA stage D systolic heart failure    Anticoagulation goal of INR 2.0 to 2.5    Falls    Clavicle fracture    CKD (chronic kidney disease), stage III    Iron deficiency anemia    H/O epistaxis    Vertigo    GI bleed    S/P TVR (tricuspid valve repair)    S/P ventricular assist device    S/P endoscopy    H/O tracheostomy      ====================ASSESSMENT ==============  65M PMH LVAD, recurrent serratia bacteremia, pneumonia, intubated/trach x2 cycles, chronic gall bladder disease s/p percutaneous cholecystectomy, SMA ischemia s/p stent, cachexia who presented from [facility] for septic shock    Plan:  ====================== NEUROLOGY=====================  AMS  - per nursing home staff pt has been altered for one week  - pt AAOx0, not verbally responding to staff  - continue to monitor neuro status  - CTH at OSH negative for acute pathology   - 2/28 CTH: Mild to mod white matter microvascular ischemic disease. No acute pathology   - 3/3 CTH: Chronic microvascular changes, no acute pathology   - unable to get Brain MRI 2/2 LVAD.   - vEEG: generalized slowing and no evidence for focal slowing, epileptiform discharges, or seizures. Now d/c   - c/w sertraline   - Neuro following, recs appreciated. Will send ammonia and B1 levels. Start Vit D       ==================== RESPIRATORY======================  Hypercapnic Respiratory failure   - Pt hypercarbic at OSH, placed on BiPAP, now weaned off  - appears comfortable now on room air   - continue to monitor respiratory status     Hx trach w/ stoma  - Pt trach x2 cycles in past  - most recently in setting of COVID PNA in 02/2022  - Pt decannulated after admission  - purulent drainage in stoma site, cultured pending results  - ENT states no sign of infection at Stoma site- Plan for possible TE fistula closure when pt is stable as per ENT       ====================CARDIOVASCULAR==================  ACC/AHA stage D systolic heart failure s/p LVAD + Distributive Shock   - pt initially appeared hypovolemic on admission  - on and off pressor support w/ Vaso for MAP goal > 70   -  c/w Midodrine 20 TID to maintain MAP >70   - central sat sent from intro 81.8 with negative lactate x4    Ventricular Tachycardia  - pt has history of VT on previous admissions, takes home mexiletine  - significant NSVT and VT seen on tele in CICU overnight and this AM  - s/p lido gtt   - restarted home Mexitil 150 TID   - monitor on tele, monitor lytes   - EP following     Aortic thrombus  - enlarging aortic thrombus on CT CAP. patient has a history of recurrent GI bleeds and was unable to tolerate AC outpatient. Therefore risk/benefit does not favor AC.  - Monitor off AC at this time     ===================HEMATOLOGIC/ONC ===================  Anemia  - Hgb on admission 7.4 with subsequent drop to 6.6  - s/p 2u PRBC 2/28. Now h/h has been stable. Will check cbc q12-24  - of note, pt has hx of GI bleeds  - no blood noted in BM, continue to monitor   - transfuse as needed if hgb <7    ===================== RENAL =========================  RASHAWN, resolved   - serum Cr downtrended and now stable at baseline   - continue to monitor SCr, BUN, lytes, and I&Os  - replete lytes prn with goal K 4-4.5 and mg >2    ==================== GASTROINTESTINAL===================  Hx GI bleed in past req multiple transfusions   - no overt bleeding noted on this admission   - monitor BM for signs of blood. h/h stable for past 24 hrs without any blood noted in BM  - c/w PPI  - Bowel regimen with Senna    Diet  - c/w Jevity 55cc/hr as tolerated      =======================    ENDOCRINE  =====================  Hx Hyperthyroidism   - continue home methimazole     Hyperglycemia   -maintain q6h ISS while on continuous enteral feeds       ========================INFECTIOUS DISEASE================  Septic shock  - pt got zosyn and vancomycin x1 at OSH. switched from zosyn to cefepime. s/p vanco-, d/c yesterday as BCX with serratia.   - BCX 2/28 gram neg rods x4 bottles-- growing serratia sensitive to Cefepime/Erta   - f/u stoma culture sent 3/5- Sputum culture neg   - 3/2 BCX NGTD x2, pending 3/6 BCX  - CT C/A/P 3/3 showing incr size of abd aortic thrombus (infected thrombus?)  - c/w Cefepime 1g q12h 2/28- , may need 4-6wk of therapy if this is intravascular infection.           Patient requires continuous monitoring with bedside rhythm monitoring, arterial line, pulse oximetry, ventilator monitoring and intermittent blood gas analysis.  Care plan discussed with ICU care team.  Patient remained critical; required more than usual post op care; I have spent 35 minutes providing non-routine post op care, in addition to initial critical time provided by CICU attending Dr. Machado, re-evaluated multiple times during the day.         RICKY HAMPTON  MRN-[id number]  Patient is a 66y old  Male who presents with a chief complaint of Septic shock (06 Mar 2022 20:31)    HPI:  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 s/p HM2 LVAD in 9/2017 at  (due to severe PAD) with TV ring, multiple GI bleeds, thus maintained off AC, CKD 3prior COVID-19 infection in 4/2020, recurrent syncope post LVAD s/p ILR, s/p prolonged complex hospitalization from 6/14-11/16/2021 initially admitted with abdominal pain complicated by GIB, respiratory failure s/p trach, multiple infections, s/p cholecystotomy tube and SMA stent 10/2021, ultimately discharged to [facility] rehab and then returned to [facility] for trach decannulation 12/2, readmitted in 2/2022 with recurrent COVID-19 infection, initially in distributive shock. Blood cultures were also positive for serratia marcescens which he has had in the past.     Pt was transferred to [facility] CICU from [facility]. He was sent to their ED from a rehab center due to AMS and tachycardia. At [facility], they noted a WBC greater than 20, tachycardia and hypercarbia on ABG. Pt was placed on BiPAP and transferred to [facility] CICU for concern for sepsis vs septic shock.      (27 Feb 2022 22:20)      Hospital course:    Today:    Physical Exam:  Vital Signs Last 24 Hrs  T(C): 36.7 (07 Mar 2022 03:00), Max: 37.4 (06 Mar 2022 20:00)  T(F): 98.1 (07 Mar 2022 03:00), Max: 99.3 (06 Mar 2022 20:00)  HR: 75 (07 Mar 2022 06:00) (72 - 99)  BP: --  BP(mean): --  RR: 24 (07 Mar 2022 06:00) (22 - 36)  SpO2: 97% (07 Mar 2022 06:00) (96% - 99%)    PHYSICAL EXAM:  Constitutional: Patient laying in bed, NAD  Head: Atraumatic, normocephalic  Eyes: No scleral icterus. PERRLA. EOMI  ENMT: Moist mucous membrane. Uvula midline  Neck: Supple, No JVD  Respiratory: CTA B/L. No wheezes, rales or rhonchi   Cardiovascular: S1/S2. No murmurs, rubs, or gallops.  Gastrointestinal: Nondistended, BSx4, soft, nontender.  Extremities: Moves all extremities. Warm, no edema, nontender  Vascular: 2+ DP/PT pulses B/L   Neurological: A&Ox3. Follows commands. No focal deficits.   Skin: No rashes on exposed skin     ============================I/O===========================   I&O's Detail    05 Mar 2022 07:01  -  06 Mar 2022 07:00  --------------------------------------------------------  IN:    Albumin 5%  - 250 mL: 500 mL    Enteral Tube Flush: 100 mL    IV PiggyBack: 50 mL    IV PiggyBack: 125 mL    Jevity 1.2: 1320 mL    Lidocaine: 30 mL    PRBCs (Packed Red Blood Cells): 350 mL    Vasopressin: 4.8 mL    Vasopressin: 50.4 mL  Total IN: 2530.2 mL    OUT:    Drain (mL): 10 mL    Voided (mL): 1095 mL  Total OUT: 1105 mL    Total NET: 1425.2 mL      06 Mar 2022 07:01  -  07 Mar 2022 06:39  --------------------------------------------------------  IN:    Enteral Tube Flush: 40 mL    Jevity 1.2: 1155 mL    Lactated Ringers: 950 mL  Total IN: 2145 mL    OUT:    Indwelling Catheter - Urethral (mL): 1610 mL    Vasopressin: 0 mL  Total OUT: 1610 mL    Total NET: 535 mL        ============================ LABS =========================                        9.3    14.57 )-----------( 195      ( 07 Mar 2022 05:00 )             29.4     03-07    135  |  103  |  14  ----------------------------<  147<H>  4.2   |  24  |  0.97    Ca    8.4      07 Mar 2022 05:00  Phos  2.4     03-07  Mg     1.8     03-07    TPro  7.4  /  Alb  2.7<L>  /  TBili  0.2  /  DBili  x   /  AST  24  /  ALT  16  /  AlkPhos  132<H>  03-07    LIVER FUNCTIONS - ( 07 Mar 2022 05:00 )  Alb: 2.7 g/dL / Pro: 7.4 g/dL / ALK PHOS: 132 U/L / ALT: 16 U/L / AST: 24 U/L / GGT: x           PTT - ( 07 Mar 2022 05:01 )  PTT:26.8 sec      ======================Micro/Rad/Cardio=================  Culture: Reviewed   CXR: Reviewed  Echo:Reviewed  ======================================================  PAST MEDICAL & SURGICAL HISTORY:  CHF (congestive heart failure)    CAD (coronary artery disease)    Depression    Pleural effusion    History of 2019 novel coronavirus disease (COVID-19)  april 2020    Hemorrhoids    Bleeding hemorrhoids    Peripheral arterial disease    Claudication    BPH with urinary obstruction    ACC/AHA stage D systolic heart failure    Anticoagulation goal of INR 2.0 to 2.5    Falls    Clavicle fracture    CKD (chronic kidney disease), stage III    Iron deficiency anemia    H/O epistaxis    Vertigo    GI bleed    S/P TVR (tricuspid valve repair)    S/P ventricular assist device    S/P endoscopy    H/O tracheostomy      ====================ASSESSMENT ==============  65M PMH LVAD, recurrent serratia bacteremia, pneumonia, intubated/trach x2 cycles, chronic gall bladder disease s/p percutaneous cholecystectomy, SMA ischemia s/p stent, cachexia who presented from [facility] for septic shock    Plan:  ====================== NEUROLOGY=====================  AMS  - per nursing home staff pt has been altered for one week  - pt AAOx0, not verbally responding to staff  - continue to monitor neuro status  - CTH at OSH negative for acute pathology   - 2/28 CTH: Mild to mod white matter microvascular ischemic disease. No acute pathology   - 3/3 CTH: Chronic microvascular changes, no acute pathology   - unable to get Brain MRI 2/2 LVAD.   - vEEG: generalized slowing and no evidence for focal slowing, epileptiform discharges, or seizures. Now d/c   - c/w sertraline   - Neuro following, recs appreciated. Will send ammonia and B1 levels. Start Vit D       ==================== RESPIRATORY======================  Hypercapnic Respiratory failure   - Pt hypercarbic at OSH, placed on BiPAP, now weaned off  - appears comfortable now on room air   - continue to monitor respiratory status     Hx trach w/ stoma  - Pt trach x2 cycles in past  - most recently in setting of COVID PNA in 02/2022  - Pt decannulated after admission  - purulent drainage in stoma site, cultured pending results  - ENT states no sign of infection at Stoma site- Plan for possible TE fistula closure when pt is stable as per ENT       ====================CARDIOVASCULAR==================  ACC/AHA stage D systolic heart failure s/p LVAD + Distributive Shock   - pt initially appeared hypovolemic on admission  - on and off pressor support w/ Vaso for MAP goal > 70   -  c/w Midodrine 20 TID to maintain MAP >70   - central sat sent from intro 81.8 with negative lactate x4    Ventricular Tachycardia  - pt has history of VT on previous admissions, takes home mexiletine  - significant NSVT and VT seen on tele in CICU overnight and this AM  - s/p lido gtt   - restarted home Mexitil 150 TID   - monitor on tele, monitor lytes   - EP following     Aortic thrombus  - enlarging aortic thrombus on CT CAP. patient has a history of recurrent GI bleeds and was unable to tolerate AC outpatient. Therefore risk/benefit does not favor AC.  - Monitor off AC at this time     ===================HEMATOLOGIC/ONC ===================  Anemia  - Hgb on admission 7.4 with subsequent drop to 6.6  - s/p 2u PRBC 2/28. Last pRBC transfused 3/6 at 3am. Will check cbc q12-24  - of note, pt has hx of GI bleeds  - no blood noted in BM, continue to monitor   - transfuse as needed if hgb <7    ===================== RENAL =========================  RASHAWN, resolved   - serum Cr downtrended and now stable at baseline   - continue to monitor SCr, BUN, lytes, and I&Os  - replete lytes prn with goal K 4-4.5 and mg >2    ==================== GASTROINTESTINAL===================  Hx GI bleed in past req multiple transfusions   - no overt bleeding noted on this admission   - monitor BM for signs of blood. h/h stable for past 24 hrs without any blood noted in BM  - c/w PPI  - Bowel regimen with Senna    Diet  - c/w Jevity 55cc/hr as tolerated      =======================    ENDOCRINE  =====================  Hx Hyperthyroidism   - continue home methimazole     Hyperglycemia   -maintain q6h ISS while on continuous enteral feeds       ========================INFECTIOUS DISEASE================  Septic shock  - pt got zosyn and vancomycin x1 at OSH. switched from zosyn to cefepime. s/p vanco-, d/c yesterday as BCX with serratia.   - BCX 2/28 gram neg rods x4 bottles-- growing serratia sensitive to Cefepime/Erta   - f/u stoma culture sent 3/5- Sputum culture neg   - 3/2 BCX NGTD x2, pending 3/6 BCX  - CT C/A/P 3/3 showing incr size of abd aortic thrombus (infected thrombus?)  - c/w Cefepime 1g q12h 2/28- , may need 4-6wk of therapy if this is intravascular infection.           Patient requires continuous monitoring with bedside rhythm monitoring, arterial line, pulse oximetry, ventilator monitoring and intermittent blood gas analysis.  Care plan discussed with ICU care team.  Patient remained critical; required more than usual post op care; I have spent 35 minutes providing non-routine post op care, in addition to initial critical time provided by CICU attending Dr. Machado, re-evaluated multiple times during the day.         RICKY HAMPTON  MRN-[id number]  Patient is a 66y old  Male who presents with a chief complaint of Septic shock (06 Mar 2022 20:31)    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 s/p HM2 LVAD in 9/2017 at  (due to severe PAD) with TV ring, multiple GI bleeds, thus maintained off AC, CKD 3prior COVID-19 infection in 4/2020, recurrent syncope post LVAD s/p ILR, s/p prolonged complex hospitalization from 6/14-11/16/2021 initially admitted with abdominal pain complicated by GIB, respiratory failure s/p trach, multiple infections, s/p cholecystotomy tube and SMA stent 10/2021, ultimately discharged to [facility] rehab and then returned to [facility] for trach decannulation 12/2, readmitted in 2/2022 with recurrent COVID-19 infection, initially in distributive shock. Blood cultures were also positive for serratia marcescens which he has had in the past.     Pt was transferred to [facility] CICU from [facility]. He was sent to their ED from a rehab center due to AMS and tachycardia. At [facility], they noted a WBC greater than 20, tachycardia and hypercarbia on ABG. Pt was placed on BiPAP and transferred to [facility] CICU for concern for sepsis vs septic shock.      (27 Feb 2022 22:20)    Today:  No acute events overnight     Physical Exam:  Vital Signs Last 24 Hrs  T(C): 36.7 (07 Mar 2022 03:00), Max: 37.4 (06 Mar 2022 20:00)  T(F): 98.1 (07 Mar 2022 03:00), Max: 99.3 (06 Mar 2022 20:00)  HR: 75 (07 Mar 2022 06:00) (72 - 99)  RR: 24 (07 Mar 2022 06:00) (22 - 36)  SpO2: 97% (07 Mar 2022 06:00) (96% - 99%)    PHYSICAL EXAM:  Constitutional: Patient laying in bed, NAD  Head: Atraumatic, normocephalic  Eyes: No scleral icterus. PERRLA.  ENMT: Dry mucous membrane. Uvula midline. Stoma  dry appearing  Neck: Supple, No JVD  Respiratory: CTA B/L although overall decreased breath sounds 2/2 poor effort. No wheezes, rales or rhonchi   Cardiovascular: Mechanical sounds heard   Gastrointestinal: Nondistended, BSx4, soft, nontender.  Extremities: Moves all extremities. Warm, no edema, nontender  Vascular: 2+ DP/PT pulses B/L   Neurological: Awake, aphasic, not answering questions. Intermittently Follows simple commands. B/L weakness in lower and upper extremities   Skin: No rashes on exposed skin     ============================I/O===========================   I&O's Detail    05 Mar 2022 07:01  -  06 Mar 2022 07:00  --------------------------------------------------------  IN:    Albumin 5%  - 250 mL: 500 mL    Enteral Tube Flush: 100 mL    IV PiggyBack: 50 mL    IV PiggyBack: 125 mL    Jevity 1.2: 1320 mL    Lidocaine: 30 mL    PRBCs (Packed Red Blood Cells): 350 mL    Vasopressin: 4.8 mL    Vasopressin: 50.4 mL  Total IN: 2530.2 mL    OUT:    Drain (mL): 10 mL    Voided (mL): 1095 mL  Total OUT: 1105 mL    Total NET: 1425.2 mL      06 Mar 2022 07:01  -  07 Mar 2022 06:39  --------------------------------------------------------  IN:    Enteral Tube Flush: 40 mL    Jevity 1.2: 1155 mL    Lactated Ringers: 950 mL  Total IN: 2145 mL    OUT:    Indwelling Catheter - Urethral (mL): 1610 mL    Vasopressin: 0 mL  Total OUT: 1610 mL    Total NET: 535 mL        ============================ LABS =========================                        9.3    14.57 )-----------( 195      ( 07 Mar 2022 05:00 )             29.4     03-07    135  |  103  |  14  ----------------------------<  147<H>  4.2   |  24  |  0.97    Ca    8.4      07 Mar 2022 05:00  Phos  2.4     03-07  Mg     1.8     03-07    TPro  7.4  /  Alb  2.7<L>  /  TBili  0.2  /  DBili  x   /  AST  24  /  ALT  16  /  AlkPhos  132<H>  03-07    LIVER FUNCTIONS - ( 07 Mar 2022 05:00 )  Alb: 2.7 g/dL / Pro: 7.4 g/dL / ALK PHOS: 132 U/L / ALT: 16 U/L / AST: 24 U/L / GGT: x           PTT - ( 07 Mar 2022 05:01 )  PTT:26.8 sec      ======================Micro/Rad/Cardio=================  Culture: Reviewed   CXR: Reviewed  Echo:Reviewed  ======================================================  PAST MEDICAL & SURGICAL HISTORY:  CHF (congestive heart failure)    CAD (coronary artery disease)    Depression    Pleural effusion    History of 2019 novel coronavirus disease (COVID-19)  april 2020    Hemorrhoids    Bleeding hemorrhoids    Peripheral arterial disease    Claudication    BPH with urinary obstruction    ACC/AHA stage D systolic heart failure    Anticoagulation goal of INR 2.0 to 2.5    Falls    Clavicle fracture    CKD (chronic kidney disease), stage III    Iron deficiency anemia    H/O epistaxis    Vertigo    GI bleed    S/P TVR (tricuspid valve repair)    S/P ventricular assist device    S/P endoscopy    H/O tracheostomy      ====================ASSESSMENT ==============  65M PMH LVAD, recurrent serratia bacteremia, pneumonia, intubated/trach x2 cycles, chronic gall bladder disease s/p percutaneous cholecystectomy, SMA ischemia s/p stent, cachexia who presented from [facility] for septic shock    Plan:  ====================== NEUROLOGY=====================  AMS  - per nursing home staff pt has been altered for one week  - pt AAOx0, not verbally responding to staff  - continue to monitor neuro status  - CTH at OSH negative for acute pathology   - 2/28 CTH: Mild to mod white matter microvascular ischemic disease. No acute pathology   - 3/3 CTH: Chronic microvascular changes, no acute pathology   - unable to get Brain MRI 2/2 LVAD.   - vEEG: generalized slowing and no evidence for focal slowing, epileptiform discharges, or seizures. Now d/c   - c/w sertraline   - Neuro following, recs appreciated. Will send ammonia and B1 levels. Start Vit D       ==================== RESPIRATORY======================  Hypercapnic Respiratory failure   - Pt hypercarbic at OSH, placed on BiPAP, now weaned off  - appears comfortable now on room air   - continue to monitor respiratory status     Hx trach w/ stoma  - Pt trach x2 cycles in past  - most recently in setting of COVID PNA in 02/2022  - Pt decannulated after admission  - purulent drainage in stoma site, cultured pending results  - ENT states no sign of infection at Stoma site- Plan for possible TE fistula closure when pt is stable as per ENT       ====================CARDIOVASCULAR==================  ACC/AHA stage D systolic heart failure s/p LVAD + Distributive Shock   - pt initially appeared hypovolemic on admission  - on and off pressor support w/ Vaso for MAP goal > 70   -  c/w Midodrine 20 TID to maintain MAP >70   - central sat sent from intro 81.8 with negative lactate x4    Ventricular Tachycardia  - pt has history of VT on previous admissions, takes home mexiletine  - significant NSVT and VT seen on tele in CICU overnight and this AM  - s/p lido gtt   - restarted home Mexitil 150 TID   - monitor on tele, monitor lytes   - EP following     Aortic thrombus  - enlarging aortic thrombus on CT CAP. patient has a history of recurrent GI bleeds and was unable to tolerate AC outpatient. Therefore risk/benefit does not favor AC.  - Monitor off AC at this time     ===================HEMATOLOGIC/ONC ===================  Anemia  - Hgb on admission 7.4 with subsequent drop to 6.6  - s/p 2u PRBC 2/28. Last pRBC transfused 3/6 at 3am. Will check cbc q12-24  - of note, pt has hx of GI bleeds  - no blood noted in BM, continue to monitor   - transfuse as needed if hgb <7    ===================== RENAL =========================  RASHAWN, resolved   - serum Cr downtrended and now stable at baseline   - continue to monitor SCr, BUN, lytes, and I&Os  - replete lytes prn with goal K 4-4.5 and mg >2    ==================== GASTROINTESTINAL===================  Hx GI bleed in past req multiple transfusions   - no overt bleeding noted on this admission   - monitor BM for signs of blood. h/h stable for past 24 hrs without any blood noted in BM  - c/w PPI  - Bowel regimen with Senna    Diet  - c/w Jevity 55cc/hr as tolerated      =======================    ENDOCRINE  =====================  Hx Hyperthyroidism   - continue home methimazole     Hyperglycemia   -maintain q6h ISS while on continuous enteral feeds       ========================INFECTIOUS DISEASE================  Septic shock  - pt got zosyn and vancomycin x1 at OSH. switched from zosyn to cefepime. s/p vanco-, d/c yesterday as BCX with serratia.   - BCX 2/28 gram neg rods x4 bottles-- growing serratia sensitive to Cefepime/Erta   - f/u stoma culture sent 3/5- Sputum culture neg   - 3/2 BCX NGTD x2, pending 3/6 BCX  - CT C/A/P 3/3 showing incr size of abd aortic thrombus (infected thrombus?)  - c/w Cefepime 1g q12h 2/28- , may need 4-6wk of therapy if this is intravascular infection. PICC placement needed- request put in for today           Patient requires continuous monitoring with bedside rhythm monitoring, arterial line, pulse oximetry, ventilator monitoring and intermittent blood gas analysis.  Care plan discussed with ICU care team.  Patient remained critical; required more than usual post op care; I have spent 35 minutes providing non-routine post op care, in addition to initial critical time provided by CICU attending Dr. Machado, re-evaluated multiple times during the day.         RICKY HAMPTON  MRN-[id number]  Patient is a 66y old  Male who presents with a chief complaint of Septic shock (06 Mar 2022 20:31)    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 s/p HM2 LVAD in 9/2017 at  (due to severe PAD) with TV ring, multiple GI bleeds, thus maintained off AC, CKD 3prior COVID-19 infection in 4/2020, recurrent syncope post LVAD s/p ILR, s/p prolonged complex hospitalization from 6/14-11/16/2021 initially admitted with abdominal pain complicated by GIB, respiratory failure s/p trach, multiple infections, s/p cholecystotomy tube and SMA stent 10/2021, ultimately discharged to [facility] rehab and then returned to [facility] for trach decannulation 12/2, readmitted in 2/2022 with recurrent COVID-19 infection, initially in distributive shock. Blood cultures were also positive for serratia marcescens which he has had in the past.     Pt was transferred to [facility] CICU from [facility]. He was sent to their ED from a rehab center due to AMS and tachycardia. At [facility], they noted a WBC greater than 20, tachycardia and hypercarbia on ABG. Pt was placed on BiPAP and transferred to [facility] CICU for concern for sepsis vs septic shock.      (27 Feb 2022 22:20)    Today:  No acute events overnight     Physical Exam:  Vital Signs Last 24 Hrs  T(C): 36.7 (07 Mar 2022 03:00), Max: 37.4 (06 Mar 2022 20:00)  T(F): 98.1 (07 Mar 2022 03:00), Max: 99.3 (06 Mar 2022 20:00)  HR: 75 (07 Mar 2022 06:00) (72 - 99)  RR: 24 (07 Mar 2022 06:00) (22 - 36)  SpO2: 97% (07 Mar 2022 06:00) (96% - 99%)    PHYSICAL EXAM:  Constitutional: Patient laying in bed, NAD  Head: Atraumatic, normocephalic  Eyes: No scleral icterus. PERRLA.  ENMT: Dry mucous membrane. Uvula midline. Stoma  dry appearing  Neck: Supple, No JVD  Respiratory: CTA B/L although overall decreased breath sounds 2/2 poor effort. No wheezes, rales or rhonchi   Cardiovascular: Mechanical sounds heard   Gastrointestinal: Nondistended, BSx4, soft, nontender.  Extremities: Moves all extremities. Warm, no edema, nontender  Vascular: 2+ DP/PT pulses B/L   Neurological: Awake, aphasic, not answering questions. Intermittently Follows simple commands. B/L weakness in lower and upper extremities   Skin: No rashes on exposed skin     ============================I/O===========================   I&O's Detail    05 Mar 2022 07:01  -  06 Mar 2022 07:00  --------------------------------------------------------  IN:    Albumin 5%  - 250 mL: 500 mL    Enteral Tube Flush: 100 mL    IV PiggyBack: 50 mL    IV PiggyBack: 125 mL    Jevity 1.2: 1320 mL    Lidocaine: 30 mL    PRBCs (Packed Red Blood Cells): 350 mL    Vasopressin: 4.8 mL    Vasopressin: 50.4 mL  Total IN: 2530.2 mL    OUT:    Drain (mL): 10 mL    Voided (mL): 1095 mL  Total OUT: 1105 mL    Total NET: 1425.2 mL      06 Mar 2022 07:01  -  07 Mar 2022 06:39  --------------------------------------------------------  IN:    Enteral Tube Flush: 40 mL    Jevity 1.2: 1155 mL    Lactated Ringers: 950 mL  Total IN: 2145 mL    OUT:    Indwelling Catheter - Urethral (mL): 1610 mL    Vasopressin: 0 mL  Total OUT: 1610 mL    Total NET: 535 mL        ============================ LABS =========================                        9.3    14.57 )-----------( 195      ( 07 Mar 2022 05:00 )             29.4     03-07    135  |  103  |  14  ----------------------------<  147<H>  4.2   |  24  |  0.97    Ca    8.4      07 Mar 2022 05:00  Phos  2.4     03-07  Mg     1.8     03-07    TPro  7.4  /  Alb  2.7<L>  /  TBili  0.2  /  DBili  x   /  AST  24  /  ALT  16  /  AlkPhos  132<H>  03-07    LIVER FUNCTIONS - ( 07 Mar 2022 05:00 )  Alb: 2.7 g/dL / Pro: 7.4 g/dL / ALK PHOS: 132 U/L / ALT: 16 U/L / AST: 24 U/L / GGT: x           PTT - ( 07 Mar 2022 05:01 )  PTT:26.8 sec      ======================Micro/Rad/Cardio=================  Culture: Reviewed   CXR: Reviewed  Echo:Reviewed  ======================================================  PAST MEDICAL & SURGICAL HISTORY:  CHF (congestive heart failure)    CAD (coronary artery disease)    Depression    Pleural effusion    History of 2019 novel coronavirus disease (COVID-19)  april 2020    Hemorrhoids    Bleeding hemorrhoids    Peripheral arterial disease    Claudication    BPH with urinary obstruction    ACC/AHA stage D systolic heart failure    Anticoagulation goal of INR 2.0 to 2.5    Falls    Clavicle fracture    CKD (chronic kidney disease), stage III    Iron deficiency anemia    H/O epistaxis    Vertigo    GI bleed    S/P TVR (tricuspid valve repair)    S/P ventricular assist device    S/P endoscopy    H/O tracheostomy      ====================ASSESSMENT ==============  65M PMH LVAD, recurrent serratia bacteremia, pneumonia, intubated/trach x2 cycles, chronic gall bladder disease s/p percutaneous cholecystectomy, SMA ischemia s/p stent, cachexia who presented from [facility] for septic shock    Plan:  ====================== NEUROLOGY=====================  AMS  - per nursing home staff pt has been altered for one week  - pt AAOx0, not verbally responding to staff  - continue to monitor neuro status  - CTH at OSH negative for acute pathology   - 2/28 CTH: Mild to mod white matter microvascular ischemic disease. No acute pathology   - 3/3 CTH: Chronic microvascular changes, no acute pathology   - unable to get Brain MRI 2/2 LVAD.   - vEEG: generalized slowing and no evidence for focal slowing, epileptiform discharges, or seizures. Now d/c   - c/w sertraline   - Neuro following, recs appreciated. Will send ammonia and B1 levels. Start Vit D       ==================== RESPIRATORY======================  Hypercapnic Respiratory failure   - Pt hypercarbic at OSH, placed on BiPAP, now weaned off  - appears comfortable now on room air   - continue to monitor respiratory status     Hx trach w/ stoma  - Pt trach x2 cycles in past  - most recently in setting of COVID PNA in 02/2022  - Pt decannulated after admission  - purulent drainage in stoma site, cultured pending results  - ENT states no sign of infection at Stoma site- Plan for possible TE fistula closure when pt is stable as per ENT       ====================CARDIOVASCULAR==================  ACC/AHA stage D systolic heart failure s/p LVAD + Distributive Shock   - pt initially appeared hypovolemic on admission  - on and off pressor support w/ Vaso for MAP goal > 70   -  c/w Midodrine 20 TID to maintain MAP >70   - central sat sent from intro 81.8 with negative lactate x4    Ventricular Tachycardia  - pt has history of VT on previous admissions, takes home mexiletine  - significant NSVT and VT seen on tele in CICU overnight and this AM  - s/p lido gtt- weaned off 3/5 afternoon   - restarted home Mexitil 150 TID   - monitor on tele, monitor lytes   - EP following     Aortic thrombus  - enlarging aortic thrombus on CT CAP. patient has a history of recurrent GI bleeds and was unable to tolerate AC outpatient. Therefore risk/benefit does not favor AC.  - Monitor off AC at this time     ===================HEMATOLOGIC/ONC ===================  Anemia  - Hgb on admission 7.4 with subsequent drop to 6.6  - s/p 2u PRBC 2/28. Last pRBC transfused 3/6 at 3am. Will check cbc q12-24  - of note, pt has hx of GI bleeds  - no blood noted in BM, continue to monitor   - transfuse as needed if hgb <7    ===================== RENAL =========================  RASHAWN, resolved   - serum Cr downtrended and now stable at baseline   - continue to monitor SCr, BUN, lytes, and I&Os  - replete lytes prn with goal K 4-4.5 and mg >2    ==================== GASTROINTESTINAL===================  Hx GI bleed in past req multiple transfusions   - no overt bleeding noted on this admission   - monitor BM for signs of blood. h/h stable for past 24 hrs without any blood noted in BM  - c/w PPI  - Bowel regimen with Senna    Diet  - c/w Jevity 55cc/hr as tolerated      =======================    ENDOCRINE  =====================  Hx Hyperthyroidism   - continue home methimazole     Hyperglycemia   -maintain q6h ISS while on continuous enteral feeds       ========================INFECTIOUS DISEASE================  Septic shock  - pt got zosyn and vancomycin x1 at OSH. switched from zosyn to cefepime. s/p vanco-, d/c yesterday as BCX with serratia.   - BCX 2/28 gram neg rods x4 bottles-- growing serratia sensitive to Cefepime/Erta   - f/u stoma culture sent 3/5- Sputum culture neg   - 3/2 BCX NGTD x2, pending 3/6 BCX  - CT C/A/P 3/3 showing incr size of abd aortic thrombus (infected thrombus?)  - c/w Cefepime 1g q12h 2/28- , may need 4-6wk of therapy if this is intravascular infection. PICC placement needed- request put in for today           Patient requires continuous monitoring with bedside rhythm monitoring, arterial line, pulse oximetry, ventilator monitoring and intermittent blood gas analysis.  Care plan discussed with ICU care team.  Patient remained critical; required more than usual post op care; I have spent 35 minutes providing non-routine post op care, in addition to initial critical time provided by CICU attending Dr. Machado, re-evaluated multiple times during the day.         RICKY HAMPTON  MRN-[id number]  Patient is a 66y old  Male who presents with a chief complaint of Septic shock (06 Mar 2022 20:31)    HPI:  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 s/p HM2 LVAD in 9/2017 at  (due to severe PAD) with TV ring, multiple GI bleeds, thus maintained off AC, CKD 3prior COVID-19 infection in 4/2020, recurrent syncope post LVAD s/p ILR, s/p prolonged complex hospitalization from 6/14-11/16/2021 initially admitted with abdominal pain complicated by GIB, respiratory failure s/p trach, multiple infections, s/p cholecystotomy tube and SMA stent 10/2021, ultimately discharged to [facility] rehab and then returned to [facility] for trach decannulation 12/2, readmitted in 2/2022 with recurrent COVID-19 infection, initially in distributive shock. Blood cultures were also positive for serratia marcescens which he has had in the past.     Pt was transferred to [facility] CICU from [facility]. He was sent to their ED from a rehab center due to AMS and tachycardia. At [facility], they noted a WBC greater than 20, tachycardia and hypercarbia on ABG. Pt was placed on BiPAP and transferred to [facility] CICU for concern for sepsis vs septic shock.      (27 Feb 2022 22:20)    Today:  No acute events overnight     Physical Exam:  Vital Signs Last 24 Hrs  T(C): 36.7 (07 Mar 2022 03:00), Max: 37.4 (06 Mar 2022 20:00)  T(F): 98.1 (07 Mar 2022 03:00), Max: 99.3 (06 Mar 2022 20:00)  HR: 75 (07 Mar 2022 06:00) (72 - 99)  RR: 24 (07 Mar 2022 06:00) (22 - 36)  SpO2: 97% (07 Mar 2022 06:00) (96% - 99%)    PHYSICAL EXAM:  Constitutional: Patient laying in bed, NAD  Head: Atraumatic, normocephalic  Eyes: No scleral icterus. PERRLA.  ENMT: Dry mucous membrane. Uvula midline. Stoma  dry appearing  Neck: Supple, No JVD  Respiratory: CTA B/L although overall decreased breath sounds 2/2 poor effort. No wheezes, rales or rhonchi   Cardiovascular: Mechanical sounds heard   Gastrointestinal: Nondistended, BSx4, soft, nontender.  Extremities: Moves all extremities. Warm, no edema, nontender  Vascular: 2+ DP/PT pulses B/L   Neurological: Awake, aphasic, not answering questions. Intermittently Follows simple commands. B/L weakness in lower and upper extremities   Skin: No rashes on exposed skin     ============================I/O===========================   I&O's Detail    05 Mar 2022 07:01  -  06 Mar 2022 07:00  --------------------------------------------------------  IN:    Albumin 5%  - 250 mL: 500 mL    Enteral Tube Flush: 100 mL    IV PiggyBack: 50 mL    IV PiggyBack: 125 mL    Jevity 1.2: 1320 mL    Lidocaine: 30 mL    PRBCs (Packed Red Blood Cells): 350 mL    Vasopressin: 4.8 mL    Vasopressin: 50.4 mL  Total IN: 2530.2 mL    OUT:    Drain (mL): 10 mL    Voided (mL): 1095 mL  Total OUT: 1105 mL    Total NET: 1425.2 mL      06 Mar 2022 07:01  -  07 Mar 2022 06:39  --------------------------------------------------------  IN:    Enteral Tube Flush: 40 mL    Jevity 1.2: 1155 mL    Lactated Ringers: 950 mL  Total IN: 2145 mL    OUT:    Indwelling Catheter - Urethral (mL): 1610 mL    Vasopressin: 0 mL  Total OUT: 1610 mL    Total NET: 535 mL        ============================ LABS =========================                        9.3    14.57 )-----------( 195      ( 07 Mar 2022 05:00 )             29.4     03-07    135  |  103  |  14  ----------------------------<  147<H>  4.2   |  24  |  0.97    Ca    8.4      07 Mar 2022 05:00  Phos  2.4     03-07  Mg     1.8     03-07    TPro  7.4  /  Alb  2.7<L>  /  TBili  0.2  /  DBili  x   /  AST  24  /  ALT  16  /  AlkPhos  132<H>  03-07    LIVER FUNCTIONS - ( 07 Mar 2022 05:00 )  Alb: 2.7 g/dL / Pro: 7.4 g/dL / ALK PHOS: 132 U/L / ALT: 16 U/L / AST: 24 U/L / GGT: x           PTT - ( 07 Mar 2022 05:01 )  PTT:26.8 sec      ======================Micro/Rad/Cardio=================  Culture: Reviewed   CXR: Reviewed  Echo:Reviewed  ======================================================  PAST MEDICAL & SURGICAL HISTORY:  CHF (congestive heart failure)    CAD (coronary artery disease)    Depression    Pleural effusion    History of 2019 novel coronavirus disease (COVID-19)  april 2020    Hemorrhoids    Bleeding hemorrhoids    Peripheral arterial disease    Claudication    BPH with urinary obstruction    ACC/AHA stage D systolic heart failure    Anticoagulation goal of INR 2.0 to 2.5    Falls    Clavicle fracture    CKD (chronic kidney disease), stage III    Iron deficiency anemia    H/O epistaxis    Vertigo    GI bleed    S/P TVR (tricuspid valve repair)    S/P ventricular assist device    S/P endoscopy    H/O tracheostomy      ====================ASSESSMENT ==============  65M PMH LVAD, recurrent serratia bacteremia, pneumonia, intubated/trach x2 cycles, chronic gall bladder disease s/p percutaneous cholecystectomy, SMA ischemia s/p stent, cachexia who presented from [facility] for septic shock    Plan:  ====================== NEUROLOGY=====================  AMS  - per nursing home staff pt has been altered for one week  - pt AAOx0, not verbally responding to staff  - continue to monitor neuro status  - CTH at OSH negative for acute pathology   - 2/28 CTH: Mild to mod white matter microvascular ischemic disease. No acute pathology   - 3/3 CTH: Chronic microvascular changes, no acute pathology   - unable to get Brain MRI 2/2 LVAD.   - vEEG: generalized slowing and no evidence for focal slowing, epileptiform discharges, or seizures. Now d/c   - c/w sertraline   - Neuro following, recs appreciated. Will send ammonia and B1 levels. Start Vit D       ==================== RESPIRATORY======================  Hypercapnic Respiratory failure   - Pt hypercarbic at OSH, placed on BiPAP, now weaned off  - appears comfortable now on room air   - continue to monitor respiratory status     Hx trach w/ stoma  - Pt trach x2 cycles in past  - most recently in setting of COVID PNA in 02/2022  - Pt decannulated after admission  - purulent drainage in stoma site, cultured pending results  - ENT states no sign of infection at Stoma site- Plan for possible TE fistula closure when pt is stable as per ENT       ====================CARDIOVASCULAR==================  ACC/AHA stage D systolic heart failure s/p LVAD + Distributive Shock   - pt initially appeared hypovolemic on admission  - on and off pressor support w/ Vaso for MAP goal > 70   -  c/w Midodrine 20 TID to maintain MAP >70   - central sat sent from intro 81.8 with negative lactate x4    Ventricular Tachycardia  - pt has history of VT on previous admissions, takes home mexiletine  - significant NSVT and VT seen on tele in CICU overnight and this AM  - s/p lido gtt- weaned off 3/5 afternoon   - restarted home Mexitil 150 TID   - monitor on tele, monitor lytes   - EP following     Aortic thrombus  - enlarging aortic thrombus on CT CAP. patient has a history of recurrent GI bleeds and was unable to tolerate AC outpatient. Therefore risk/benefit does not favor AC.  - Monitor off AC at this time     ===================HEMATOLOGIC/ONC ===================  Anemia  - Hgb on admission 7.4 with subsequent drop to 6.6  - s/p 2u PRBC 2/28. Last pRBC transfused 3/6 at 3am. Will check cbc q12-24  - of note, pt has hx of GI bleeds  - no blood noted in BM, continue to monitor   - transfuse as needed if hgb <7    ===================== RENAL =========================  RASHAWN, resolved   - serum Cr downtrended and now stable at baseline   - continue to monitor SCr, BUN, lytes, and I&Os  - replete lytes prn with goal K 4-4.5 and mg >2    ==================== GASTROINTESTINAL===================  Hx GI bleed in past req multiple transfusions   - no overt bleeding noted on this admission   - monitor BM for signs of blood. h/h stable for past 24 hrs without any blood noted in BM  - c/w PPI  - Bowel regimen with Senna    Diet  - c/w Jevity 55cc/hr as tolerated  - Consider PEG placement, discuss GOC w/ Fam      =======================    ENDOCRINE  =====================  Hx Hyperthyroidism   - continue home methimazole     Hyperglycemia   -maintain q6h ISS while on continuous enteral feeds       ========================INFECTIOUS DISEASE================  Septic shock  - pt got zosyn and vancomycin x1 at OSH. switched from zosyn to cefepime. s/p vanco-, d/c yesterday as BCX with serratia.   - BCX 2/28 gram neg rods x4 bottles-- growing serratia sensitive to Cefepime/Erta   - f/u stoma culture sent 3/5- Sputum culture neg   - 3/2 BCX NGTD x2, pending 3/6 BCX  - CT C/A/P 3/3 showing incr size of abd aortic thrombus (infected thrombus?)  - c/w Cefepime 1g q12h 2/28- , may need 4-6wk of therapy if this is intravascular infection. PICC placement needed- request put in for today       GOC  - DNR/DNI  - Consider PEG placement, discuss GOC w/ Fam  - Palliative following, recs appreciated         Patient requires continuous monitoring with bedside rhythm monitoring, arterial line, pulse oximetry, ventilator monitoring and intermittent blood gas analysis.  Care plan discussed with ICU care team.  Patient remained critical; required more than usual post op care; I have spent 35 minutes providing non-routine post op care, in addition to initial critical time provided by CICU attending Dr. Machado, re-evaluated multiple times during the day.         RICKY HAMPTON  MRN-[id number]  Patient is a 66y old  Male who presents with a chief complaint of Septic shock (06 Mar 2022 20:31)    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 s/p HM2 LVAD in 9/2017 at  (due to severe PAD) with TV ring, multiple GI bleeds, thus maintained off AC, CKD 3prior COVID-19 infection in 4/2020, recurrent syncope post LVAD s/p ILR, s/p prolonged complex hospitalization from 6/14-11/16/2021 initially admitted with abdominal pain complicated by GIB, respiratory failure s/p trach, multiple infections, s/p cholecystotomy tube and SMA stent 10/2021, ultimately discharged to [facility] rehab and then returned to [facility] for trach decannulation 12/2, readmitted in 2/2022 with recurrent COVID-19 infection, initially in distributive shock. Blood cultures were also positive for serratia marcescens which he has had in the past.     Pt was transferred to [facility] CICU from [facility]. He was sent to their ED from a rehab center due to AMS and tachycardia. At [facility], they noted a WBC greater than 20, tachycardia and hypercarbia on ABG. Pt was placed on BiPAP and transferred to [facility] CICU for concern for sepsis vs septic shock.      (27 Feb 2022 22:20)    Today:  No acute events overnight     Physical Exam:  Vital Signs Last 24 Hrs  T(C): 36.7 (07 Mar 2022 03:00), Max: 37.4 (06 Mar 2022 20:00)  T(F): 98.1 (07 Mar 2022 03:00), Max: 99.3 (06 Mar 2022 20:00)  HR: 75 (07 Mar 2022 06:00) (72 - 99)  RR: 24 (07 Mar 2022 06:00) (22 - 36)  SpO2: 97% (07 Mar 2022 06:00) (96% - 99%)    PHYSICAL EXAM:  Constitutional: Patient laying in bed, NAD  Head: Atraumatic, normocephalic  Eyes: No scleral icterus. PERRLA.  ENMT: Dry mucous membrane. Uvula midline. Stoma  dry appearing  Neck: Supple, No JVD  Respiratory: CTA B/L although overall decreased breath sounds 2/2 poor effort. No wheezes, rales or rhonchi   Cardiovascular: Mechanical sounds heard   Gastrointestinal: Nondistended, BSx4, soft, nontender.  Extremities: Moves all extremities. Warm, no edema, nontender  Vascular: 2+ DP/PT pulses B/L   Neurological: Awake, aphasic, not answering questions. Intermittently Follows simple commands. B/L weakness in lower and upper extremities   Skin: No rashes on exposed skin     ============================I/O===========================   I&O's Detail    05 Mar 2022 07:01  -  06 Mar 2022 07:00  --------------------------------------------------------  IN:    Albumin 5%  - 250 mL: 500 mL    Enteral Tube Flush: 100 mL    IV PiggyBack: 50 mL    IV PiggyBack: 125 mL    Jevity 1.2: 1320 mL    Lidocaine: 30 mL    PRBCs (Packed Red Blood Cells): 350 mL    Vasopressin: 4.8 mL    Vasopressin: 50.4 mL  Total IN: 2530.2 mL    OUT:    Drain (mL): 10 mL    Voided (mL): 1095 mL  Total OUT: 1105 mL    Total NET: 1425.2 mL      06 Mar 2022 07:01  -  07 Mar 2022 06:39  --------------------------------------------------------  IN:    Enteral Tube Flush: 40 mL    Jevity 1.2: 1155 mL    Lactated Ringers: 950 mL  Total IN: 2145 mL    OUT:    Indwelling Catheter - Urethral (mL): 1610 mL    Vasopressin: 0 mL  Total OUT: 1610 mL    Total NET: 535 mL        ============================ LABS =========================                        9.3    14.57 )-----------( 195      ( 07 Mar 2022 05:00 )             29.4     03-07    135  |  103  |  14  ----------------------------<  147<H>  4.2   |  24  |  0.97    Ca    8.4      07 Mar 2022 05:00  Phos  2.4     03-07  Mg     1.8     03-07    TPro  7.4  /  Alb  2.7<L>  /  TBili  0.2  /  DBili  x   /  AST  24  /  ALT  16  /  AlkPhos  132<H>  03-07    LIVER FUNCTIONS - ( 07 Mar 2022 05:00 )  Alb: 2.7 g/dL / Pro: 7.4 g/dL / ALK PHOS: 132 U/L / ALT: 16 U/L / AST: 24 U/L / GGT: x           PTT - ( 07 Mar 2022 05:01 )  PTT:26.8 sec      ======================Micro/Rad/Cardio=================  Culture: Reviewed   CXR: Reviewed  Echo:Reviewed  ======================================================  PAST MEDICAL & SURGICAL HISTORY:  CHF (congestive heart failure)    CAD (coronary artery disease)    Depression    Pleural effusion    History of 2019 novel coronavirus disease (COVID-19)  april 2020    Hemorrhoids    Bleeding hemorrhoids    Peripheral arterial disease    Claudication    BPH with urinary obstruction    ACC/AHA stage D systolic heart failure    Anticoagulation goal of INR 2.0 to 2.5    Falls    Clavicle fracture    CKD (chronic kidney disease), stage III    Iron deficiency anemia    H/O epistaxis    Vertigo    GI bleed    S/P TVR (tricuspid valve repair)    S/P ventricular assist device    S/P endoscopy    H/O tracheostomy      ====================ASSESSMENT ==============  65M PMH LVAD, recurrent serratia bacteremia, pneumonia, intubated/trach x2 cycles, chronic gall bladder disease s/p percutaneous cholecystectomy, SMA ischemia s/p stent, cachexia who presented from [facility] for septic shock    Plan:  ====================== NEUROLOGY=====================  AMS  - per nursing home staff pt has been altered for one week  - pt AAOx0, not verbally responding to staff  - continue to monitor neuro status  - CTH at OSH negative for acute pathology   - 2/28 CTH: Mild to mod white matter microvascular ischemic disease. No acute pathology   - 3/3 CTH: Chronic microvascular changes, no acute pathology   - unable to get Brain MRI 2/2 LVAD.   - vEEG: generalized slowing and no evidence for focal slowing, epileptiform discharges, or seizures. Now d/c   - c/w sertraline   - Neuro following, recs appreciated. Will send ammonia and B1 levels. Start Vit D       ==================== RESPIRATORY======================  Hypercapnic Respiratory failure   - Pt hypercarbic at OSH, placed on BiPAP, now weaned off  - appears comfortable now on room air   - continue to monitor respiratory status     Hx trach w/ stoma  - Pt trach x2 cycles in past  - most recently in setting of COVID PNA in 02/2022  - Pt decannulated after admission  - purulent drainage in stoma site, cultured pending results  - ENT states no sign of infection at Stoma site- Plan for possible TE fistula closure when pt is stable as per ENT. Spoke to ENT 3/7 in regards to fistula closure and states will do as an outpatient once off supplemental oxygen for several weeks. No plan for any closure while inpatient.        ====================CARDIOVASCULAR==================  ACC/AHA stage D systolic heart failure s/p LVAD + Distributive Shock   - pt initially appeared hypovolemic on admission  - on and off pressor support w/ Vaso for MAP goal > 70   -  c/w Midodrine 20 TID to maintain MAP >70   - central sat sent from intro 81.8 with negative lactate x4    Ventricular Tachycardia  - pt has history of VT on previous admissions, takes home mexiletine  - significant NSVT and VT seen on tele in CICU overnight and this AM  - s/p lido gtt- weaned off 3/5 afternoon   - restarted home Mexitil 150 TID   - monitor on tele, monitor lytes   - EP following     Aortic thrombus  - enlarging aortic thrombus on CT CAP. patient has a history of recurrent GI bleeds and was unable to tolerate AC outpatient. Therefore risk/benefit does not favor AC.  - Monitor off AC at this time     ===================HEMATOLOGIC/ONC ===================  Anemia  - Hgb on admission 7.4 with subsequent drop to 6.6  - s/p 2u PRBC 2/28. Last pRBC transfused 3/6 at 3am. Will check cbc q12-24  - of note, pt has hx of GI bleeds  - no blood noted in BM, continue to monitor   - transfuse as needed if hgb <7    ===================== RENAL =========================  RASHAWN, resolved   - serum Cr downtrended and now stable at baseline   - continue to monitor SCr, BUN, lytes, and I&Os  - replete lytes prn with goal K 4-4.5 and mg >2    ==================== GASTROINTESTINAL===================  Hx GI bleed in past req multiple transfusions   - no overt bleeding noted on this admission   - monitor BM for signs of blood. h/h stable for past 24 hrs without any blood noted in BM  - c/w PPI  - Bowel regimen with Senna    Diet  - c/w Jevity 55cc/hr as tolerated  - Consider PEG placement, discuss GOC w/ Fam      =======================    ENDOCRINE  =====================  Hx Hyperthyroidism   - continue home methimazole     Hyperglycemia   -maintain q6h ISS while on continuous enteral feeds       ========================INFECTIOUS DISEASE================  Septic shock  - pt got zosyn and vancomycin x1 at OSH. switched from zosyn to cefepime. s/p vanco-, d/c yesterday as BCX with serratia.   - BCX 2/28 gram neg rods x4 bottles-- growing serratia sensitive to Cefepime/Erta   - f/u stoma culture sent 3/5- Sputum culture neg   - 3/2 BCX NGTD x2, pending 3/6 BCX  - CT C/A/P 3/3 showing incr size of abd aortic thrombus (infected thrombus?)  - c/w Cefepime 1g q12h 2/28- , may need 4-6wk of therapy if this is intravascular infection. PICC placement needed- request put in for today       GOC  - DNR/DNI  - Consider PEG placement, discuss GOC w/ Fam  - Palliative following, recs appreciated         Patient requires continuous monitoring with bedside rhythm monitoring, arterial line, pulse oximetry, ventilator monitoring and intermittent blood gas analysis.  Care plan discussed with ICU care team.  Patient remained critical; required more than usual post op care; I have spent 35 minutes providing non-routine post op care, in addition to initial critical time provided by CICU attending Dr. Machado, re-evaluated multiple times during the day.

## 2022-03-07 NOTE — PROGRESS NOTE ADULT - ATTENDING COMMENTS
Patient seen and examined. Agree with assessment and plan as outlined above.  65 yo M with Nicmp s/p LVAD placement in 2017 off anti-coagulation secondary to recurrent GI bleeds, CKD readmitted with covid infection/distributive shock with serratia bacteremia transferred from [facility]. Blood pressure improved off vasopressin. MAPs ~80 mmHg off pressor support. Continue IV cefepime. Appreciate neuro/heart failure follow-up. Patient DNR/DNI.

## 2022-03-07 NOTE — PROGRESS NOTE ADULT - PROBLEM SELECTOR PLAN 1
2/28 BCx + serratia/GNR  - IV abx per ID. currently on cefepime.  - CT C/A/P showing small bilateral pleural effusions, small volume ascites, which is new, decreased splenic lesion, aortic thrombus narrowing the lumen at least 505 at the infrarenal segment, increased from prior  - currently off vaso on midodrine  - home mirtazapine and gabapentin discontinued given lethargy  - appreciate palliative care input regarding GOC  - Awaiting negative BC to permit PICC line placement.

## 2022-03-07 NOTE — PROGRESS NOTE ADULT - SUBJECTIVE AND OBJECTIVE BOX
RICKY HAMPTON  MRN-[id number]  Patient is a 66y old  Male who presents with a chief complaint of Septic shock (07 Mar 2022 18:27)    HPI: 66M Hx Stage D 2/2 NICM s/p HM2 LVAD in 9/2017 at  (due to severe PAD) with TV ring, multiple GI bleeds, thus maintained off AC, CKD 3prior COVID-19 infection in 4/2020, recurrent syncope post LVAD s/p ILR, s/p prolonged complex hospitalization from 6/14-11/16/2021 initially admitted with abdominal pain complicated by GIB, respiratory failure s/p trach, multiple infections, s/p cholecystotomy tube and SMA stent 10/2021, ultimately discharged to [facility] rehab and then returned to [facility] for trach decannulation 12/2, readmitted in 2/2022 with recurrent COVID-19 infection, initially in distributive shock. Blood cultures were also positive for serratia marcescens which he has had in the past. Pt was transferred to [facility] CICU from [facility]. He was sent to their ED from a rehab center due to AMS and tachycardia. At Health system, they noted a WBC greater than 20, tachycardia and hypercarbia on ABG. Pt was placed on BiPAP and transferred to [facility] CICU for concern for sepsis vs septic shock.     REVIEW OF SYSTEMS:  CONSTITUTIONAL: No weakness, fevers or chills  EYES/ENT: No visual changes;  No vertigo or throat pain   NECK: No pain or stiffness  RESPIRATORY: No cough, wheezing, hemoptysis; No shortness of breath  CARDIOVASCULAR: No chest pain or palpitations  GASTROINTESTINAL: No abdominal or epigastric pain. No nausea, vomiting, or hematemesis; No diarrhea or constipation. No melena or hematochezia.  GENITOURINARY: No dysuria, frequency or hematuria  NEUROLOGICAL: No numbness or weakness  SKIN: No itching, rashes      ICU Vital Signs Last 24 Hrs  T(C): 37 (07 Mar 2022 16:00), Max: 37.2 (06 Mar 2022 23:00)  T(F): 98.6 (07 Mar 2022 16:00), Max: 99 (06 Mar 2022 23:00)  HR: 80 (07 Mar 2022 18:00) (72 - 91)  ABP: 100/76 (07 Mar 2022 18:00) (81/64 - 104/76)  ABP(mean): 88 (07 Mar 2022 18:00) (72 - 89)  RR: 20 (07 Mar 2022 18:00) (20 - 36)  SpO2: 99% (07 Mar 2022 18:00) (96% - 99%)      CVP(mm Hg): 2 (03-07-22 @ 18:00) (-3 - 30)    I&O's Summary    06 Mar 2022 07:01  -  07 Mar 2022 07:00  --------------------------------------------------------  IN: 2310 mL / OUT: 1735 mL / NET: 575 mL    07 Mar 2022 07:01  -  07 Mar 2022 20:11  --------------------------------------------------------  IN: 1160 mL / OUT: 1340 mL / NET: -180 mL      CAPILLARY BLOOD GLUCOSE  POCT Blood Glucose.: 143 mg/dL (07 Mar 2022 17:18)      PHYSICAL EXAM:  General: WN/WD NAD  Neurology: Somnolent, not responding to painful stimuli or following commands  Respiratory: CTA B/L, no wheezing   CV: LVAD hum. No palpable pulses  Abdominal: Soft, NTND, LVAD driveline dsg c/d/i. perc choley drain intact +bilious output  Extremities: No edema, warm to touch  ============================I/O===========================   I&O's Detail    06 Mar 2022 07:01  -  07 Mar 2022 07:00  --------------------------------------------------------  IN:    Enteral Tube Flush: 40 mL    Jevity 1.2: 1320 mL    Lactated Ringers: 950 mL  Total IN: 2310 mL    OUT:    Indwelling Catheter - Urethral (mL): 1735 mL    Vasopressin: 0 mL  Total OUT: 1735 mL    Total NET: 575 mL      07 Mar 2022 07:01  -  07 Mar 2022 20:11  --------------------------------------------------------  IN:    Enteral Tube Flush: 200 mL    IV PiggyBack: 300 mL    Jevity 1.2: 660 mL  Total IN: 1160 mL    OUT:    Drain (mL): 10 mL    Indwelling Catheter - Urethral (mL): 1330 mL  Total OUT: 1340 mL    Total NET: -180 mL  ============================ LABS =========================                        9.3    14.57 )-----------( 195      ( 07 Mar 2022 05:00 )             29.4     03-07    135  |  103  |  14  ----------------------------<  147<H>  4.2   |  24  |  0.97    Ca    8.4      07 Mar 2022 05:00  Phos  2.4     03-07  Mg     1.8     03-07    TPro  7.4  /  Alb  2.7<L>  /  TBili  0.2  /  DBili  x   /  AST  24  /  ALT  16  /  AlkPhos  132<H>  03-07    LIVER FUNCTIONS - ( 07 Mar 2022 05:00 )  Alb: 2.7 g/dL / Pro: 7.4 g/dL / ALK PHOS: 132 U/L / ALT: 16 U/L / AST: 24 U/L / GGT: x           PTT - ( 07 Mar 2022 05:01 )  PTT:26.8 sec    Blood Gas Arterial, Lactate: 1.1 mmol/L (03-05-22 @ 04:33)  ======================Micro/Rad/Cardio=================  Telemetry: Reviewed   EKG: Reviewed  CXR: Reviewed  Culture: Reviewed   Echo: Transthoracic Echocardiogram:   Patient name: RICKY HAMPTON  YOB: 1955   Age: 66 (M)   MR#: [id number]  Study Date: 2/28/2022  [facility]  Study quality: Technically fair  Referring Physician: Jus Jensen MD  Blood Pressure: 96/81 mmHg  Height: 178 cm  Weight: 49 kg  BSA: 1.6 m2  ------------------------------------------------------------------------  PROCEDURE: Transthoracic echocardiogram with 2-D, M-Mode  and complete spectral and color flow Doppler.  INDICATION: Chronic ischemic heart disease, unspecified  (I25.9)  ------------------------------------------------------------------------  Dimensions:    Normal Values:  LA:     3.1    2.0 - 4.0 cm  Ao:     3.6    2.0 - 3.8 cm  SEPTUM: 1.1    0.6 - 1.2 cm  PWT: 1.1    0.6 - 1.1 cm  LVIDd:  4.1    3.0 - 5.6 cm  LVIDs:  3.9    1.8 - 4.0 cm  Derived variables:  LVMI: 95 g/m2  RWT: 0.53  Fractional short: 5 %  EF (Visual Estimate): 10 %  ------------------------------------------------------------------------  Observations:  Mitral Valve: Thickened mitral valve. Tethered mitral valve  leaflets with normal opening. Moderate mitral  regurgitation. Peak mitral valve gradient equals 6 mm Hg,  mean transmitral valve gradient equals 2 mm Hg, consistent  with mild mitral stenosis.  Aortic Valve/Aorta: Calcified aortic valve which remains  closed on all observed beats.  Mild continuous aortic  regurgitation.  Aortic Root: 3.6 cm.  Left Atrium: Normal left atrium.  LA volume index = 30  cc/m2.  Left Ventricle: Severe global left ventricular systolic  dysfunction.  Severe reversible diastolic dysfunction  (Stage III).  Right Heart: Normal right atrium. Normal right ventricular  size with decreased right ventricular systolic function. An  annuloplasty ring isseen in the tricuspid position.  Moderate tricuspid regurgitation.  Peak tricuspid valve  gradient equals 6 mm Hg, mean transmitral valve gradient  equals 2 mm Hg. Gradients measured at a HR of 88bpm.  Normal pulmonic valve. Mild-moderate pulmonic  regurgitation.  Pericardium/Pleura: Normal pericardium with no pericardial  effusion.  Hemodynamic: Estimated right ventricular systolic pressure  equals 25.36 - 29.36 mm Hg, assuming right atrial pressure  equals 6 - 10 mm Hg, consistent with normal pulmonary  hypertension. Color Doppler demonstrates no evidence of a  patent foramen ovale.  ------------------------------------------------------------------------  Conclusions:  1. Thickened mitral valve. Tethered mitral valve leaflets  with normal opening. Moderate mitral regurgitation. Peak  mitral valve gradient equals 6 mm Hg, mean transmitral  valve gradient equals 2 mm Hg, consistent with mild mitral  stenosis.  2. Calcified aortic valve which remains closed on all  observed beats.  Mild continuous aortic regurgitation.  3. Severe global left ventricular systolic dysfunction.  4. A Heartmate 2 LVAD at a sped of 9200RPM is present. The  aortic valve is closed.  There is mild continuious AI.  The  septum is midline. Pulsitile flow measuring 0.8m/s is  measured in to the LVAD inflow cannular  5. Normal right ventricular size with decreased right  ventricular systolic function.  6. An annuloplasty ring is seen in the tricuspid position.  Moderate tricuspid regurgitation.  Peak tricuspid valve  gradient equals 6 mm Hg, mean transmitral valve gradient  equals 2 mm Hg. Gradients measured at a HR of 88bpm.  7. Normal pulmonic valve. Mild-moderate pulmonic  regurgitation.    Transthoracic Echocardiogram:   Patient name: RICKY HAMPTON  YOB: 1955   Age: 66 (M)   MR#: [id number]  Study Date: 1/13/2022  Location: [address] [place], [place] [id number]: Karla Sellers [facility]  Study quality: Technically difficult  Referring Physician: Peace Rodriguez MD  Blood Pressure: 78/66 mmHg  Height: 183 cm  Weight: 54 kg  BSA: 1.7 m2  ------------------------------------------------------------------------  PROCEDURE: Transthoracic echocardiogram with 2-D, M-Mode  and complete spectral and color flow Doppler.  INDICATION: Acute and subacute infective endocarditis  (I33.0)  ------------------------------------------------------------------------  Dimensions:    Normal Values:  LA:     2.8    2.0 - 4.0 cm  Ao:     3.7    2.0 - 3.8 cm  SEPTUM: 0.7    0.6 - 1.2 cm  PWT:    0.9    0.6 - 1.1 cm  LVIDd:  4.6    3.0 - 5.6 cm  LVIDs:  3.2    1.8 - 4.0 cm  Derived variables:  LVMI: 69 g/m2  RWT: 0.39  EF (Visual Estimate):  %  ------------------------------------------------------------------------  Observations:  MitralValve: Normal mitral valve.  Aortic Valve/Aorta: Calcified aortic valve which remains  closed on all observed beats.  Mild, continuous aortic regurgitation.  Normal aortic root size.  Left Atrium: Normal left atrium.  Left Ventricle: Normal left ventricular internal dimensions  and wall thicknesses.  Left ventricular assist device (LVAD) noted in the apex  with laminar flow.  Right Heart: Normal right atrium.  Right ventricular enlargement with decreased right  ventricular systolic function.  An annuloplasty ring is seen in the tricuspid position.  Mild tricuspid regurgitation.  Pericardium/Pleura: Normal pericardium with no pericardial  effusion.  Hemodynamic: No evidence of pulmonary hypertension.  ------------------------------------------------------------------------  Conclusions:  Heartmate 2 at  9200/min.  Normal left ventricular internal dimensions and wall  thicknesses.  Left ventricular assist device (LVAD) noted in the apex  with laminar flow.  Calcified aortic valve which remains closed on all observed  beats. Mild, continuous aortic regurgitation.  Right ventricular enlargement with decreased right  ventricular systolic function.  An annuloplasty ring is seen in the tricuspid position.  Mild tricuspid regurgitation.    Transthoracic Echocardiogram:   Patient name: RICKY HAMPTON  YOB: 1955   Age: 66 (M)   MR#: [id number]  Study Date: 12/13/2021  Location: [address] [place], [place] [id number]: Marisa Hartley RDCS  Study quality: Technically fair  Referring Physician: Cayetano Kaminski MD  Blood Pressure: 83/56 mmHg  Height: 182 cm  Weight: 55 kg  BSA: 1.7 m2  Heart Rate: 88 mmHg  ------------------------------------------------------------------------  PROCEDURE: Transthoracic echocardiogram with 2-D, M-Mode  and complete spectral and color flow Doppler.  INDICATION: Cardiogenic shock (R57.0)  ------------------------------------------------------------------------  Dimensions:    Normal Values:  LA:     3.4    2.0 - 4.0 cm  Ao:     3.4    2.0 - 3.8 cm  SEPTUM: 0.7    0.6 - 1.2 cm  PWT:    0.9  0.6 - 1.1 cm  LVIDd:  5.5    3.0 - 5.6 cm  LVIDs:  4.5    1.8 - 4.0 cm  Derived variables:  LVMI: 92 g/m2  RWT: 0.32  Fractional short: 18 %  EF (Visual Estimate): 10 %  ------------------------------------------------------------------------  Observations:  Mitral Valve: Tethered mitral valve leaflets with normal  opening. Moderate mitral regurgitation.  Aortic Valve/Aorta: The aortic valve opens partially with  every observed beat.  Mild continuious aortic  regurgitation.  Aortic Root: 3.4 cm.  Left Atrium: Normal left atrium.  Left Ventricle: Severe global left ventricular systolic  dysfunction.  Mild diastolic dysfunction (Stage I).  Right Heart: Normal right atrium. Normal right ventricular  size with decreased right ventricular systolic function. An  annuloplasty ring is seen in the tricuspid position. Mild  tricuspid regurgitation. Peak tricuspid valve gradient  equals 5 mm Hg, mean transtricuspid valve gradient equals 2  mm Hg. Gradients measured at a HR of 85bpm.  Normal  pulmonic valve. No pulmonic regurgitation.  Pericardium/Pleura: Normal pericardium with no pericardial  effusion.  Hemodynamic: Estimated right atrial pressure is 8 mm Hg.  Estimated right ventricular systolic pressure equals 35 mm  Hg, assuming right atrial pressure equals 8 mm Hg,  consistent with borderline pulmonary hypertension.  ------------------------------------------------------------------------  Conclusions:  1. Tethered mitral valve leaflets with normal opening.  Moderate mitral regurgitation.  2. The aortic valve opens partially with every observed  beat.  Mild continuious aortic regurgitation.  3. Severe global left ventricular systolic dysfunction.  4. A Heartmate 2 LVAD is present in the left ventricle at  the speed of 9200RPM. Theaortic valve opens partially with  every observed beat. There is mild continuious aortic  regurgitation.  The LVAD cannula is visualized in the apex  with pulsitile velocities less than 1.2m/s. The septum  appears midline.  5. Normal right ventricular size with decreased right  ventricular systolic function.    Transthoracic Echocardiogram:   Patient name: RICKY HAMPTON  YOB: 1955   Age: 66 (M)   MR#: [id number]  Study Date: 10/4/2021  Location: [id number]: SIRIA Novak  Study quality: Technically good  Referring Physician: Janusz Cadet MD  Blood Pressure: 107/77 mmHg  Height: 178 cm  Weight: 62 kg  BSA: 1.8 m2  Heart Rhythm: Normal sinus  ------------------------------------------------------------------------  PROCEDURE: Transthoracic echocardiogram with 2-D, M-Mode  and complete spectral and color flow Doppler.  INDICATION: Abnormal electrocardiogram  (R94.31 )  ------------------------------------------------------------------------  Dimensions:    Normal Values:  LA:     3.3    2.0 - 4.0 cm  Ao:     3.7    2.0 - 3.8 cm  SEPTUM: 1.1    0.6 - 1.2 cm  PWT:    1.0    0.6 - 1.1 cm  LVIDd:  4.5    3.0 - 5.6 cm  LVIDs:  4.3    1.8 - 4.0 cm  Derived variables:  LVMI: 93 g/m2  RWT: 0.44  EF (Visual Estimate):  %  ------------------------------------------------------------------------  Observations:  Mitral Valve: Normal mitral valve. Mild mitral  regurgitation.  Aortic Valve/Aorta: The aortic valve opens with every  observed beat.  Mild aortic regurgitation.  Normal aortic root size.  Left Atrium: Normal left atrium.  Left Ventricle: Normal left ventricular internal dimensions  and wall thicknesses.  Severely decreased left ventricular systolic function.  Diffuse hypokinesis.  Paradoxic septal motion due to previous surgery.  LVAD noted at the apex, with laminar flow.  Right Heart: Normal right atrium. Normal right ventricular  size. Right ventricular systolic function is difficult to  infer.  An annuloplasty ring is seen in the tricuspid position.  Pericardium/Pleura: Normal pericardium with no pericardial  effusion.  Hemodynamic: Estimated right atrial pressure is normal.  ------------------------------------------------------------------------  Conclusions:  Normal left ventricular internal dimensions and wall  thicknesses.  Severely decreased left ventricular systolic function.  Diffuse hypokinesis.  Paradoxic septal motion due to previous surgery.  LVAD noted at the apex, with laminar flow.  The aortic valve opens with every observed beat. Mild  aortic regurgitation.  An annuloplasty ring is seen in the tricuspid position.  5:17)    ======================================================  PAST MEDICAL & SURGICAL HISTORY:  CHF (congestive heart failure)    CAD (coronary artery disease)    Depression    Pleural effusion    History of 2019 novel coronavirus disease (COVID-19)  april 2020    Hemorrhoids    Bleeding hemorrhoids    Peripheral arterial disease    Claudication    BPH with urinary obstruction    ACC/AHA stage D systolic heart failure    Anticoagulation goal of INR 2.0 to 2.5    Falls    Clavicle fracture    CKD (chronic kidney disease), stage III    Iron deficiency anemia    H/O epistaxis    Vertigo    GI bleed    S/P TVR (tricuspid valve repair)    S/P ventricular assist device    S/P endoscopy    H/O tracheostomy

## 2022-03-08 NOTE — CHART NOTE - NSCHARTNOTEFT_GEN_A_CORE
MAR Accept Note  Mickey Davis MD, PGY3    Transfer to:  Medicine  Accepting Attending Physician: Dr. Hoffman   Assigned Room: [facility] 265W    Patient seen and examined.   Labs and data reviewed.   No findings precluding transfer of service.       HPI/MICU COURSE:   Please refer to CICU transfer note for full details. Briefly, this is a 65M PMH LVAD, recurrent serratia bacteremia, pneumonia, intubated/trach x2 cycles, chronic gall bladder disease s/p percutaneous cholecystectomy, SMA ischemia s/p stent, cachexia who presented from [facility] for septic shock    Patient presenting with AMS- infectious workup sent and found to have serratia bacteremia x4. Initially presenting with hypercapnic resp failure requiring BiPAP  CTH done for AMS x3- chronic microvascular changes. CT C/A/P done to identify source- none definitively on scan. On cefepime. GOC discussion had with palliative team and patient made DNR/DNI 3/3.         FOR FOLLOW-UP:    [ ] f/u ID recs  [ ] Consider PEG placement, discuss GOC w/ Fam  [ ] Palliative recs  [ ] Eventual PICC placement prior to d/c for 6wk course of IV ABX  [ ] f/u with social work for rehab placement     Mickey Davis, PGY3  MAR [id number] (NS) / [id number] ([facility])

## 2022-03-08 NOTE — PROGRESS NOTE ADULT - PROBLEM SELECTOR PLAN 1
2/28 BCx + serratia/GNR  - IV abx per ID. currently on cefepime.  - CT C/A/P showing small bilateral pleural effusions, small volume ascites, which is new, decreased splenic lesion, aortic thrombus narrowing the lumen at least 505 at the infrarenal segment, increased from prior  - currently off vaso on midodrine; MAPs improved today, will decrease dose to 10 mg TID  - home mirtazapine and gabapentin discontinued given lethargy  - appreciate palliative care input regarding GOC; ?PEG placement will need to be readdressed depending upon clinical improvement  - will follow ID recs regarding PICC line placement

## 2022-03-08 NOTE — PROGRESS NOTE ADULT - THIS PATIENT HAS THE FOLLOWING CONDITION(S)/DIAGNOSES ON THIS ADMISSION:
Sepsis
None
None
Sepsis/Heart Failure
Heart Failure
None
None
Sepsis/Heart Failure
Sepsis/Heart Failure
None
Sepsis/Heart Failure

## 2022-03-08 NOTE — GOALS OF CARE CONVERSATION - ADVANCED CARE PLANNING - CONVERSATION DETAILS
d/w the patient's sister about the patient's persistent impaired mental status and limited capacity to f/u commands. We also discussed about his lack of capacity to safely swallow and risks of aspiration. I indicated that at this point a discussion about a PEG was needed. I indicated that, a NGT was only a temporary measure and that if she were to believe the patient may have wanted to preserve his current QOL and to continue to prolong his life as a priority that then a PEG was probably appropriate, of course, as long as it were to be technically possible. I indicated that if no PEG is to be placed that then we needed to discuss about EOL care. She gave verbalized understanding about the info provided as was going to reach out to the patient's son, sister, and brother in law in order to d/w them about nutritional goals.     20' were spent discussing about life sustaining Rx ( artificial nutrition).

## 2022-03-08 NOTE — PROGRESS NOTE ADULT - ASSESSMENT
66 years old male with PMHx of Stage D HF 2/2 NICM s/p HM2 LVAD in 9/2017 at  (due to severe PAD) with TV ring, multiple GI bleeds, no AC, CKD 3 prior COVID-19 infection in 4/2020, chronic cholecystitis s/p percutaneous cholecystostomy tube, recurrent COVID infection, Serratia bacteremia transferred from [facility] for sepsis.    ID is consulted for septic shock  Recently had Serratia bacteremia discharged on suppressive PO Levaquin  Returned with leukocytosis, fever, AMS, hypotension requiring pressors  ?purulent sputum coming out from previous trach site  CXR no PNA  CTH unrevealing  LVAD exit sites shows no signs of infection  Perc dawn tube site shows no signs of infection  BCx Serratia, S cefepime and ertapenem, R quinolones  CT showed abdominal aortic thrombus and a 2.6cm splenic lesion    Antibiotics:  Vancomycin 2/28 - 3/1  Zosyn 2/28  Cefepime 2/28 -     Currently unclear etiology of septic shock likely secondary to recurrent serratia bacteremia  Source of serratia is could be LVAD, infected aortic thrombus or splenic lesion  Regardless will treat this as in intravascular infection so likely 4 - 6 weeks in total    Detailed discussion with CICU attending Dr. Jensen, patient likely would have worsening respiratory status but due to his multiple comorbidities and previous intubation, it is futile to escalate care to intubation as patient will likely unable to be weaned off ventilator.      IMPRESSION:  Septic shock 2/2 serratia bacteremia  Aortic thrombus  Splenic lesion  Leukocytosis  Fever  LVAD      RECOMMENDATIONS:  - Continue IV cefepime 1gm q12hrs  - Follow up repeat blood cultures show evidence of bacteremia clearance  - mental status improved but not yet at baseline, off pressors and oxygen supplementation    Morris Prater MD  [phone]  After 5pm/weekends [phone]

## 2022-03-08 NOTE — PROGRESS NOTE ADULT - ASSESSMENT
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 s/p HM2 LVAD in 9/2017 at  (due to severe PAD) with TV ring, multiple GI bleeds, thus maintained off AC, prior COVID-19 infection in 4/2020, recurrent syncope post LVAD s/p ILR,.     S/p prolonged complex hospitalization from 6/14-11/16/2021 initially admitted with abdominal pain complicated by GIB, respiratory failure s/p trach, multiple infections, s/p cholecystotomy tube and SMA stent 10/2021, ultimately discharged to [facility] rehab and then returned to [facility] for trach decannulation 12/2. The patient had a recent admission with COVID 19 reinfection and distributive shock. That admission he had blood culture positive for serratia marcescens (discharged on levaquin).  He was treated with MAB, remdesivir, and steroids. He had recurrent VT and was started mexiletine.     Now coming in from [facility] ED with leukocytosis and AMS was treated for UTI. Initially required bipap but was weaned off here. Labs concerning here for WBC 26, hemoglobin of 7s then 6.7, creatinine of 1.3. His maps were initially in the 50s. Physical exam showing pus from stoma, sat of central access of 81.8, and tachycardia are all concerning for septic shock picture. He is s/p 1L fluids, 1uPRBCs, was placed on vaso. Patient has known episodes of VT and had recurrent NSVT/VT when he first arrived started on lidocaine infusion with improvement. Of note he was previously on mexiletine 150 TID outpatient, metoprolol ER 25 qAM and 50 qPM. Previous history of thyrotoxicosis on amiodarone.    He was found to be bacteremic with recurrent serratia, on IV abx possibly r/t LVAD infection vs splenic abscess noted on CT 1/19. Afebrile since 3/1. Leukocytosis improving. CVP was low despite albumin and IVF with transient improvement. s/p 1 unit PRBC 3/6. Vasopressin weaned off 3/4, started on midodrine. LVAD without s/s of pump malfunction. This morning he appears more alert and is following commands. Overall, critically ill with guarded prognosis.

## 2022-03-08 NOTE — PROGRESS NOTE ADULT - SUBJECTIVE AND OBJECTIVE BOX
[facility] Division of Hospital Medicine  Monisha Oh DO  8a-5pm: [phone]  after 5pm call [phone]    HPI:  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 s/p HM2 LVAD in 9/2017 at  (due to severe PAD) with TV ring, multiple GI bleeds, thus maintained off AC, CKD 3prior COVID-19 infection in 4/2020, recurrent syncope post LVAD s/p ILR, s/p prolonged complex hospitalization from 6/14-11/16/2021 initially admitted with abdominal pain complicated by GIB, respiratory failure s/p trach, multiple infections, s/p cholecystotomy tube and SMA stent 10/2021, ultimately discharged to [facility] rehab and then returned to [facility] for trach decannulation 12/2, readmitted in 2/2022 with recurrent COVID-19 infection, initially in distributive shock. Blood cultures were also positive for serratia marcescens which he has had in the past.     Pt was transferred to [facility] CICU from [facility]. He was sent to their ED from a rehab center due to AMS and tachycardia. At [facility], they noted a WBC greater than 20, tachycardia and hypercarbia on ABG. Pt was placed on BiPAP and transferred to [facility] CICU for concern for sepsis vs septic shock.      (27 Feb 2022 22:20)      PAST MEDICAL & SURGICAL HISTORY:  CHF (congestive heart failure)    CAD (coronary artery disease)    Depression    Pleural effusion    History of 2019 novel coronavirus disease (COVID-19)  april 2020    Hemorrhoids    Bleeding hemorrhoids    Peripheral arterial disease    Claudication    BPH with urinary obstruction    ACC/AHA stage D systolic heart failure    Anticoagulation goal of INR 2.0 to 2.5    Falls    Clavicle fracture    CKD (chronic kidney disease), stage III    Iron deficiency anemia    H/O epistaxis    Vertigo    GI bleed    S/P TVR (tricuspid valve repair)    S/P ventricular assist device    S/P endoscopy    H/O tracheostomy        Review of Systems:   CONSTITUTIONAL: No fever  EYES: No eye pain, visual disturbances  ENMT:  No difficulty hearing,   NECK: No pain or stiffness  RESPIRATORY: No cough, No shortness of breath  CARDIOVASCULAR: No chest pain, palpitations,  GASTROINTESTINAL: No abdominal or epigastric pain. No nausea, vomiting,  GENITOURINARY: No dysuria,   NEUROLOGICAL: No headaches,   SKIN: No rashes  ENDOCRINE: No heat or cold intolerance  MUSCULOSKELETAL: No joint pain or swelling;   PSYCHIATRIC: No depression,   ALLERY AND IMMUNOLOGIC: No hives or eczema    Allergies    Amiodarone Hydrochloride (Other (Severe))    Intolerances        Social History:     FAMILY HISTORY:      MEDICATIONS  (STANDING):  cefepime   IVPB 1000 milliGRAM(s) IV Intermittent every 12 hours  chlorhexidine 2% Cloths 1 Application(s) Topical <User Schedule>  cholecalciferol 2000 Unit(s) Oral daily  insulin lispro (ADMELOG) corrective regimen sliding scale   SubCutaneous every 6 hours  methimazole 10 milliGRAM(s) Oral daily  mexiletine 150 milliGRAM(s) Oral every 8 hours  midodrine 10 milliGRAM(s) Oral every 8 hours  multivitamin 1 Tablet(s) Oral daily  pantoprazole  Injectable 40 milliGRAM(s) IV Push every 12 hours  senna 2 Tablet(s) Oral at bedtime  sertraline 100 milliGRAM(s) Oral daily    MEDICATIONS  (PRN):      Vital Signs Last 24 Hrs  T(C): 36.5 (08 Mar 2022 15:00), Max: 37 (07 Mar 2022 19:00)  T(F): 97.7 (08 Mar 2022 15:00), Max: 98.6 (07 Mar 2022 19:00)  HR: 82 (08 Mar 2022 15:00) (72 - 96)  BP: --  BP(mean): 96 (08 Mar 2022 15:00) (90 - 98)  RR: 18 (08 Mar 2022 15:00) (18 - 25)  SpO2: 98% (08 Mar 2022 15:00) (97% - 99%)  CAPILLARY BLOOD GLUCOSE      POCT Blood Glucose.: 118 mg/dL (08 Mar 2022 12:10)  POCT Blood Glucose.: 132 mg/dL (08 Mar 2022 05:22)  POCT Blood Glucose.: 138 mg/dL (07 Mar 2022 23:09)  POCT Blood Glucose.: 143 mg/dL (07 Mar 2022 17:18)    I&O's Summary    07 Mar 2022 07:01  -  08 Mar 2022 07:00  --------------------------------------------------------  IN: 1870 mL / OUT: 2310 mL / NET: -440 mL    08 Mar 2022 07:01  -  08 Mar 2022 16:33  --------------------------------------------------------  IN: 385 mL / OUT: 535 mL / NET: -150 mL        PHYSICAL EXAM:  GENERAL: NAD, breathing normal  HEAD:  Atraumatic, Normocephalic  EYES: conjunctiva and sclera clear  NECK: supple, No JVD  CHEST/LUNG: CTA b/l  HEART: S1 S2 RRR  ABDOMEN: +BS Soft, NT/ND  EXTREMITIES:  2+ DP Pulses, No c/c/e  NEUROLOGY: AAOx3, no facial droop, no focal deficits   SKIN: No rashes or lesions      LABS:                        9.3    15.80 )-----------( 218      ( 08 Mar 2022 04:59 )             29.9     03-08    135  |  102  |  15  ----------------------------<  145<H>  4.6   |  25  |  0.99    Ca    8.9      08 Mar 2022 04:59  Phos  3.1     03-08  Mg     1.9     03-08    TPro  7.8  /  Alb  2.9<L>  /  TBili  0.3  /  DBili  x   /  AST  40  /  ALT  24  /  AlkPhos  156<H>  03-08    PTT - ( 08 Mar 2022 04:59 )  PTT:28.7 sec          RADIOLOGY & ADDITIONAL TESTS:    Imaging Personally Reviewed:    Consultant(s) Notes Reviewed:      Care Discussed with Consultants/Other Providers:   [facility] Division of Hospital Medicine  Monisha Oh DO  8a-5pm: [phone]  after 5pm call [phone]    HPI:  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 s/p HM2 LVAD in 9/2017 at  (due to severe PAD) with TV ring, multiple GI bleeds, thus maintained off AC, prior COVID-19 infection in 4/2020, recurrent syncope post LVAD s/p ILR, prolonged complex hospitalization from 6/14-11/16/2021 initially admitted with abdominal pain complicated by GIB, respiratory failure s/p trach, multiple infections, s/p cholecystotomy tube and SMA stent 10/2021, ultimately discharged to [facility] rehab and then returned to [facility] for trach decannulation 12/2. The patient had a recent admission with COVID 19 reinfection and distributive shock found to have blood culture positive for serratia marcescens (discharged on levaquin).  COVID treated with MAB, remdesivir, and steroids. He had recurrent VT and was started mexiletine transferred from [facility] ED with leukocytosis and AMS found to have serratia bacteremia again initially on vanco/cefepime (2/28-3/1) now on cefepime alone. bcx 3/2 NTD. Septic shock requiring pressors titrated off and now on midodrine.  intermittent h/h drop s/p total 3units prbc (last 3/6). Initially hypercarbic respiratory failure requiring bipap but was weaned off. patient has known episodes of VT and had recurrent NSVT/VT when he first arrived started on lidocaine infusion with improvement now back on mexiletine. Previous history of thyrotoxicosis on amiodarone now on methimazole. Afebrile since 3/1. Leukocytosis overall improved. CVP was low despite albumin and IVF. LVAD without s/s of pump malfunction.     Currently,     PAST MEDICAL & SURGICAL HISTORY:  CHF (congestive heart failure)    CAD (coronary artery disease)    Depression    Pleural effusion    History of 2019 novel coronavirus disease (COVID-19)  april 2020    Hemorrhoids    Bleeding hemorrhoids    Peripheral arterial disease    Claudication    BPH with urinary obstruction    ACC/AHA stage D systolic heart failure    Anticoagulation goal of INR 2.0 to 2.5    Falls    Clavicle fracture    CKD (chronic kidney disease), stage III    Iron deficiency anemia    H/O epistaxis    Vertigo    GI bleed    S/P TVR (tricuspid valve repair)    S/P ventricular assist device    S/P endoscopy    H/O tracheostomy        Review of Systems:   CONSTITUTIONAL: No fever  EYES: No eye pain, visual disturbances  ENMT:  No difficulty hearing,   NECK: No pain or stiffness  RESPIRATORY: No cough, No shortness of breath  CARDIOVASCULAR: No chest pain, palpitations,  GASTROINTESTINAL: No abdominal or epigastric pain. No nausea, vomiting,  GENITOURINARY: No dysuria,   NEUROLOGICAL: No headaches,   SKIN: No rashes  ENDOCRINE: No heat or cold intolerance  MUSCULOSKELETAL: No joint pain or swelling;   PSYCHIATRIC: No depression,   ALLERY AND IMMUNOLOGIC: No hives or eczema    Allergies    Amiodarone Hydrochloride (Other (Severe))      Social History: no smoking or etoh abuse    FAMILY HISTORY: no pertinent FHx      MEDICATIONS  (STANDING):  cefepime   IVPB 1000 milliGRAM(s) IV Intermittent every 12 hours  chlorhexidine 2% Cloths 1 Application(s) Topical <User Schedule>  cholecalciferol 2000 Unit(s) Oral daily  insulin lispro (ADMELOG) corrective regimen sliding scale   SubCutaneous every 6 hours  methimazole 10 milliGRAM(s) Oral daily  mexiletine 150 milliGRAM(s) Oral every 8 hours  midodrine 10 milliGRAM(s) Oral every 8 hours  multivitamin 1 Tablet(s) Oral daily  pantoprazole  Injectable 40 milliGRAM(s) IV Push every 12 hours  senna 2 Tablet(s) Oral at bedtime  sertraline 100 milliGRAM(s) Oral daily    MEDICATIONS  (PRN):      Vital Signs Last 24 Hrs  T(C): 36.5 (08 Mar 2022 15:00), Max: 37 (07 Mar 2022 19:00)  T(F): 97.7 (08 Mar 2022 15:00), Max: 98.6 (07 Mar 2022 19:00)  HR: 82 (08 Mar 2022 15:00) (72 - 96)  BP: --  BP(mean): 96 (08 Mar 2022 15:00) (90 - 98)  RR: 18 (08 Mar 2022 15:00) (18 - 25)  SpO2: 98% (08 Mar 2022 15:00) (97% - 99%)  CAPILLARY BLOOD GLUCOSE      POCT Blood Glucose.: 118 mg/dL (08 Mar 2022 12:10)  POCT Blood Glucose.: 132 mg/dL (08 Mar 2022 05:22)  POCT Blood Glucose.: 138 mg/dL (07 Mar 2022 23:09)  POCT Blood Glucose.: 143 mg/dL (07 Mar 2022 17:18)    I&O's Summary    07 Mar 2022 07:01  -  08 Mar 2022 07:00  --------------------------------------------------------  IN: 1870 mL / OUT: 2310 mL / NET: -440 mL    08 Mar 2022 07:01  -  08 Mar 2022 16:33  --------------------------------------------------------  IN: 385 mL / OUT: 535 mL / NET: -150 mL        PHYSICAL EXAM:  GENERAL: NAD, breathing normal  HEAD:  Atraumatic, Normocephalic  EYES: conjunctiva and sclera clear  NECK: supple, No JVD  CHEST/LUNG: CTA b/l  HEART: S1 S2 RRR  ABDOMEN: +BS Soft, NT/ND  EXTREMITIES:  2+ DP Pulses, No c/c/e  NEUROLOGY: AAOx3, no facial droop, no focal deficits   SKIN: No rashes or lesions      LABS:                        9.3    15.80 )-----------( 218      ( 08 Mar 2022 04:59 )             29.9     03-08    135  |  102  |  15  ----------------------------<  145<H>  4.6   |  25  |  0.99    Ca    8.9      08 Mar 2022 04:59  Phos  3.1     03-08  Mg     1.9     03-08    TPro  7.8  /  Alb  2.9<L>  /  TBili  0.3  /  DBili  x   /  AST  40  /  ALT  24  /  AlkPhos  156<H>  03-08    PTT - ( 08 Mar 2022 04:59 )  PTT:28.7 sec          RADIOLOGY & ADDITIONAL TESTS:    Imaging Personally Reviewed: TTE 2/28 reviewed - EF10%   1. Thickened mitral valve. Tethered mitral valve leaflets  with normal opening. Moderate mitral regurgitation. Peak  mitral valve gradient equals 6 mm Hg, mean transmitral  valve gradient equals 2 mm Hg, consistent with mild mitral  stenosis.  2. Calcified aortic valve which remains closed on all  observed beats.  Mild continuous aortic regurgitation.  3. Severe global left ventricular systolic dysfunction.  4. A Heartmate 2 LVAD at a sped of 9200RPM is present. The  aortic valve is closed.  There is mild continuious AI.  The  septum is midline. Pulsitile flow measuring 0.8m/s is  measured in to the LVAD inflow cannular  5. Normal right ventricular size with decreased right  ventricular systolic function.  6. An annuloplasty ring is seen in the tricuspid position.  Moderate tricuspid regurgitation.  Peak tricuspid valve  gradient equals 6 mm Hg, mean transmitral valve gradient  equals 2 mm Hg. Gradients measured at a HR of 88bpm.  7. Normal pulmonic valve. Mild-moderate pulmonic  regurgitation.      Consultant(s) Notes Reviewed:      Care Discussed with Consultants/Other Providers:   [facility] Division of Hospital Medicine  Monisha Oh DO  8a-5pm: [phone]  after 5pm call [phone]    HPI:  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 s/p HM2 LVAD in 9/2017 at  (due to severe PAD) with TV ring, multiple GI bleeds, thus maintained off AC, prior COVID-19 infection in 4/2020, recurrent syncope post LVAD s/p ILR, prolonged complex hospitalization from 6/14-11/16/2021 initially admitted with abdominal pain complicated by GIB, respiratory failure s/p trach, multiple infections, s/p cholecystotomy tube and SMA stent 10/2021, ultimately discharged to [facility] rehab and then returned to [facility] for trach decannulation 12/2. The patient had a recent admission with COVID 19 reinfection and distributive shock found to have blood culture positive for serratia marcescens (discharged on levaquin).  COVID treated with MAB, remdesivir, and steroids. He had recurrent VT and was started mexiletine transferred from [facility] ED with leukocytosis and AMS found to have serratia bacteremia again initially on vanco/cefepime (2/28-3/1) now on cefepime alone. bcx 3/2 NTD. Septic shock requiring pressors titrated off and now on midodrine.  intermittent h/h drop s/p total 3units prbc (last 3/6). Initially hypercarbic respiratory failure requiring bipap but was weaned off. patient has known episodes of VT and had recurrent NSVT/VT when he first arrived started on lidocaine infusion with improvement now back on mexiletine. Previous history of thyrotoxicosis on amiodarone now on methimazole. Afebrile since 3/1. Leukocytosis overall improved. CVP was low despite albumin and IVF. LVAD without s/s of pump malfunction.     Currently, nonverbal.      PAST MEDICAL & SURGICAL HISTORY:  CHF (congestive heart failure)    CAD (coronary artery disease)    Depression    Pleural effusion    History of 2019 novel coronavirus disease (COVID-19)  april 2020    Hemorrhoids    Bleeding hemorrhoids    Peripheral arterial disease    Claudication    BPH with urinary obstruction    ACC/AHA stage D systolic heart failure    Anticoagulation goal of INR 2.0 to 2.5    Falls    Clavicle fracture    CKD (chronic kidney disease), stage III    Iron deficiency anemia    H/O epistaxis    Vertigo    GI bleed    S/P TVR (tricuspid valve repair)    S/P ventricular assist device    S/P endoscopy    H/O tracheostomy        Review of Systems:   limited due to mental status    Allergies    Amiodarone Hydrochloride (Other (Severe))      Social History: no smoking or etoh abuse    FAMILY HISTORY: no pertinent FHx      MEDICATIONS  (STANDING):  cefepime   IVPB 1000 milliGRAM(s) IV Intermittent every 12 hours  chlorhexidine 2% Cloths 1 Application(s) Topical <User Schedule>  cholecalciferol 2000 Unit(s) Oral daily  insulin lispro (ADMELOG) corrective regimen sliding scale   SubCutaneous every 6 hours  methimazole 10 milliGRAM(s) Oral daily  mexiletine 150 milliGRAM(s) Oral every 8 hours  midodrine 10 milliGRAM(s) Oral every 8 hours  multivitamin 1 Tablet(s) Oral daily  pantoprazole  Injectable 40 milliGRAM(s) IV Push every 12 hours  senna 2 Tablet(s) Oral at bedtime  sertraline 100 milliGRAM(s) Oral daily    MEDICATIONS  (PRN):      Vital Signs Last 24 Hrs  T(C): 36.5 (08 Mar 2022 15:00), Max: 37 (07 Mar 2022 19:00)  T(F): 97.7 (08 Mar 2022 15:00), Max: 98.6 (07 Mar 2022 19:00)  HR: 82 (08 Mar 2022 15:00) (72 - 96)  BP: --  BP(mean): 96 (08 Mar 2022 15:00) (90 - 98)  RR: 18 (08 Mar 2022 15:00) (18 - 25)  SpO2: 98% (08 Mar 2022 15:00) (97% - 99%)  CAPILLARY BLOOD GLUCOSE      POCT Blood Glucose.: 118 mg/dL (08 Mar 2022 12:10)  POCT Blood Glucose.: 132 mg/dL (08 Mar 2022 05:22)  POCT Blood Glucose.: 138 mg/dL (07 Mar 2022 23:09)  POCT Blood Glucose.: 143 mg/dL (07 Mar 2022 17:18)    I&O's Summary    07 Mar 2022 07:01  -  08 Mar 2022 07:00  --------------------------------------------------------  IN: 1870 mL / OUT: 2310 mL / NET: -440 mL    08 Mar 2022 07:01  -  08 Mar 2022 16:33  --------------------------------------------------------  IN: 385 mL / OUT: 535 mL / NET: -150 mL        PHYSICAL EXAM:  GENERAL: NAD, breathing normal, nonverbal but opens eyes to name  EYES: conjunctiva and sclera clear  NECK: supple, No JVD, trach stoma with some whitish drainage dressed with guaze, right IJ site with gauze - no surrounding erythema  CHEST/LUNG: CTA b/l  HEART: S1 S2 RRR  ABDOMEN: +BS Soft, no grimacing with abd exam, ND, LVAD and cholecystostomy tube below  EXTREMITIES:  2+ DP Pulses, No c/c. no LE edema  NEUROLOGY: AAOx0, no facial droop, not following my commands - not moving extremities   SKIN: trach stoma with some whitish drainage dressed with gauze  LVAD site c/d/i dressed with guaze, cholecystostomy site c/d/i dressed with gauze - no surrounding erythema, signs of infection  peripheral IV site without erythema      LABS:                        9.3    15.80 )-----------( 218      ( 08 Mar 2022 04:59 )             29.9     03-08    135  |  102  |  15  ----------------------------<  145<H>  4.6   |  25  |  0.99    Ca    8.9      08 Mar 2022 04:59  Phos  3.1     03-08  Mg     1.9     03-08    TPro  7.8  /  Alb  2.9<L>  /  TBili  0.3  /  DBili  x   /  AST  40  /  ALT  24  /  AlkPhos  156<H>  03-08    PTT - ( 08 Mar 2022 04:59 )  PTT:28.7 sec          RADIOLOGY & ADDITIONAL TESTS:    Imaging Personally Reviewed: TTE 2/28 reviewed - EF10%   1. Thickened mitral valve. Tethered mitral valve leaflets  with normal opening. Moderate mitral regurgitation. Peak  mitral valve gradient equals 6 mm Hg, mean transmitral  valve gradient equals 2 mm Hg, consistent with mild mitral  stenosis.  2. Calcified aortic valve which remains closed on all  observed beats.  Mild continuous aortic regurgitation.  3. Severe global left ventricular systolic dysfunction.  4. A Heartmate 2 LVAD at a sped of 9200RPM is present. The  aortic valve is closed.  There is mild continuious AI.  The  septum is midline. Pulsitile flow measuring 0.8m/s is  measured in to the LVAD inflow cannular  5. Normal right ventricular size with decreased right  ventricular systolic function.  6. An annuloplasty ring is seen in the tricuspid position.  Moderate tricuspid regurgitation.  Peak tricuspid valve  gradient equals 6 mm Hg, mean transmitral valve gradient  equals 2 mm Hg. Gradients measured at a HR of 88bpm.  7. Normal pulmonic valve. Mild-moderate pulmonic  regurgitation.    CT c/a/p 3/3 reviewed - Small bilateral pleural effusions and small volume ascites, new from   prior exam. No drainable fluid collection.  A 2.6 m hypoattenuating splenic lesion is decreased.  Aortic thrombus narrowing the lumen at least 50% at the infrarenal   segment, increased from the prior exam.    ct head 3/3 reviewed - No intracranial hemorrhage, mass effect or hydrocephalus.  Chronic microvascular disease.    Consultant(s) Notes Reviewed:      Care Discussed with Consultants/Other Providers:

## 2022-03-08 NOTE — PROGRESS NOTE ADULT - PROBLEM SELECTOR PLAN 4
- Speed 9200  - Daily LDH, currently stable (none sent today)  - MAP goal 70-80; will start to wean midodrine as above  - no AC given history of GIB

## 2022-03-08 NOTE — PROGRESS NOTE ADULT - ATTENDING COMMENTS
Patient seen and examined. Agree with assessment and plan as outlined above.  63 yo M with NICMP s/p LVAD with septic shock now hemodynamically stable off vasopressin~48 hours. Patient remains minimally responsive. Continue antibiotics per ID. For transfer to telemetry. Palliative care assessment for peg placement.

## 2022-03-08 NOTE — PROGRESS NOTE ADULT - PROBLEM SELECTOR PLAN 1
suspect secondary to infection with diffuse cerebral dysfunction.   CT head x 2 negative for acute infarct  unable to do MRI d/t LVAD  EEG negative for seizure activity  Neuro following  ammonia low, B1 pending  home mirtazapine and gabapentin discontinued given lethargy

## 2022-03-08 NOTE — PROGRESS NOTE ADULT - ASSESSMENT
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 s/p HM2 LVAD in 9/2017 at  (due to severe PAD) with TV ring, multiple GI bleeds, thus maintained off AC, prior COVID-19 infection in 4/2020, recurrent syncope post LVAD s/p ILR, prolonged complex hospitalization from 6/14-11/16/2021 with GIB, respiratory failure s/p trach decanulated 12/2021, multiple infections, s/p cholecystotomy tube and SMA stent 10/2021, recurrent COVID 19 reinfection s/p MAB/remdesivir/steroids and distributive shock with serratia bacteremia, recurrent VT on mexiletine transferred from [facility] ED with metabolic encephalopathy, hypercarbic respiratory failure weaned off bipap, septic shock requiring pressors (now on midodrine) with serratia bacteremia on cefepime, intermittent h/h drop s/p total 3units prbc (last 3/6), episodes of recurrent VT s/p lidocaine gtt now back on mexiletine.

## 2022-03-08 NOTE — PROGRESS NOTE ADULT - SUBJECTIVE AND OBJECTIVE BOX
INFECTIOUS DISEASES FOLLOW UP-- Anitra Prater  [phone]    This is a follow up note for this  66yMale with  Sepsis  extubated on nasal cannula oxygen  transferred to the floor        ROS:  CONSTITUTIONAL:  opens eyes  Allergies    Amiodarone Hydrochloride (Other (Severe))    Intolerances        ANTIBIOTICS/RELEVANT:  antimicrobials    immunologic:    OTHER:  chlorhexidine 2% Cloths 1 Application(s) Topical <User Schedule>  cholecalciferol 2000 Unit(s) Oral daily  insulin lispro (ADMELOG) corrective regimen sliding scale   SubCutaneous every 6 hours  methimazole 10 milliGRAM(s) Oral daily  mexiletine 150 milliGRAM(s) Oral every 8 hours  midodrine 10 milliGRAM(s) Oral every 8 hours  multivitamin 1 Tablet(s) Oral daily  pantoprazole  Injectable 40 milliGRAM(s) IV Push every 12 hours  senna 2 Tablet(s) Oral at bedtime  sertraline 100 milliGRAM(s) Oral daily      Objective:  Vital Signs Last 24 Hrs  T(C): 36.5 (08 Mar 2022 15:00), Max: 37 (07 Mar 2022 19:00)  T(F): 97.7 (08 Mar 2022 15:00), Max: 98.6 (07 Mar 2022 19:00)  HR: 82 (08 Mar 2022 15:00) (72 - 96)  BP: --  BP(mean): 96 (08 Mar 2022 15:01) (90 - 98)  RR: 18 (08 Mar 2022 15:00) (18 - 25)  SpO2: 98% (08 Mar 2022 15:00) (97% - 99%)    PHYSICAL EXAM:  Constitutional:no acute distress  Eyes:ALONSO, EOMI  Ear/Nose/Throat: no oral lesions, stoma from prior trach	  Respiratory: clear BL  Cardiovascular: S1S2 LVAD sounds  Gastrointestinal thin, cholecystotomy tube remains in place  Extremities:no e/e/c  No Lymphadenopathy  IV sites not inflammed.    LABS:                        9.3    15.80 )-----------( 218      ( 08 Mar 2022 04:59 )             29.9     03-08    135  |  102  |  15  ----------------------------<  145<H>  4.6   |  25  |  0.99    Ca    8.9      08 Mar 2022 04:59  Phos  3.1     03-08  Mg     1.9     03-08    TPro  7.8  /  Alb  2.9<L>  /  TBili  0.3  /  DBili  x   /  AST  40  /  ALT  24  /  AlkPhos  156<H>  03-08    PTT - ( 08 Mar 2022 04:59 )  PTT:28.7 sec      MICROBIOLOGY:            RECENT CULTURES:  03-06 @ 14:17  .Blood Blood-Arterial  --  --  --    No growth to date.  --  03-05 @ 15:33  .Sputum Trach Site  --  --  --    Normal Respiratory Bria present  --  03-02 @ 21:52  .Blood Blood  --  --  --    No Growth Final  --      RADIOLOGY & ADDITIONAL STUDIES:    < from: Xray Chest 1 View- PORTABLE-Routine (Xray Chest 1 View- PORTABLE-Routine .) (03.08.22 @ 05:19) >  Frontal expiratory view of the chest shows the heart to be normal in   size. Right jugular catheter, sternal wires, loop recorder, feeding tube   and LVAD are present, although the feeding tube tip is not included.    The lungs show mild left base atelectasis or small infiltrate and there   is no evidence of pneumothorax nor pleural effusion.    IMPRESSION:  Lines as noted.    < end of copied text >

## 2022-03-08 NOTE — CONSULT NOTE ADULT - SUBJECTIVE AND OBJECTIVE BOX
ID CONSULTATION-- Morris Prater [phone]    Patient is a 66y old  Male who presents with a chief complaint of COVID (+) with AMS and hypotension (13 Dec 2021 13:14)    HPI:  65M PMH ACC/AHA stage D HF due to NICM HM2 LVAD , TV annuloplasty ring 9/12/17 as destination therapy due to severe peripheral artery disease with significant stenosis  SIADH, Depression, CKD-3 with hyperkalemia, past E. coli UTIs, driveline drainage (1/7/21) and COVID-19 (back in April 2020). Recurrent syncope post LVAD s/p ILR, and chronic abdominal pain and was noted to have a prolong hospital course (6/14-11/16). During that time he has multiple infections and now remains on suppressive antibiotics. Underwent SMA stent with Vascular in 10/2021, now tolerating PO diet, perc dawn drain placement and mutiple GI bleeds. Ultimately was trach and discharged to rehab to get stronger. Patient returned from rehab for trach decannulation, which was removed on 12/2.    Now presented from [facility] on [facility]13 with AMS, hypotensive, tachycardic found to be COVID (+). Pt is lethargic and mumbling words.      (13 Dec 2021 01:06)      PAST MEDICAL & SURGICAL HISTORY:  CHF (congestive heart failure)    CAD (coronary artery disease)    Depression    Pleural effusion    History of 2019 novel coronavirus disease (COVID-19)  april 2020    Hemorrhoids    Bleeding hemorrhoids    Peripheral arterial disease    Claudication    BPH with urinary obstruction    ACC/AHA stage D systolic heart failure    Anticoagulation goal of INR 2.0 to 2.5    Falls    Clavicle fracture    CKD (chronic kidney disease), stage III    Iron deficiency anemia    H/O epistaxis    Vertigo    GI bleed    S/P TVR (tricuspid valve repair)    S/P ventricular assist device    S/P endoscopy    H/O tracheostomy        SOCIAL:  Family speaks Creole    FAMILY HISTORY:  non-contributory    REVIEW OF SYSTEMS  General: frail, cachectic, trach decannulated,     Allergic/Immunologic:	No hives or rash   Allergies    Amiodarone Hydrochloride (Other (Severe))    Intolerances        ANTIMICROBIALS:    cefepime   IVPB      cefepime   IVPB 1000 milliGRAM(s) IV Intermittent every 8 hours  remdesivir  IVPB   IV Intermittent   vancomycin  IVPB 1000 milliGRAM(s) IV Intermittent every 12 hours  vancomycin  IVPB          Vital Signs Last 24 Hrs  T(C): 36.1 (13 Dec 2021 16:00), Max: 36.2 (13 Dec 2021 08:00)  T(F): 97 (13 Dec 2021 16:00), Max: 97.2 (13 Dec 2021 08:00)  HR: 79 (13 Dec 2021 17:30) (75 - 112)  BP: 80/48 (13 Dec 2021 01:00) (78/60 - 80/48)  BP(mean): 55 (13 Dec 2021 01:00) (55 - 65)  RR: 22 (13 Dec 2021 17:30) (17 - 42)  SpO2: 100% (13 Dec 2021 17:30) (86% - 100%)    PHYSICAL EXAM: awake, alert but tachypneic, answers questions      Constitutional:Comfortable.Awake and alert  but tachypneic  frail, cachectic    Eyes:PERRL EOMI.NO discharge or conjunctival injection    ENMT:No sinus tenderness.No thrush.No pharyngeal exudate or erythema.Fair dental hygiene    Neck:Supple,No LN,no JVD, neck site post trach      Respiratory: BS audible bilateral but diminished    Cardiovascular: VAD sounds    Gastrointestinal:Soft BS(+) cholecystotomy tube remains  VAd exit site dressing CDI    Genitourinary:No CVA tendereness     Rectal:    Extremities:No cyanosis,clubbing or edema.    Vascular:peripheral pulses felt    Neurological:AAO X 3, moves x4     Skin:No rash     Lymph Nodes:No palpable LNs    Musculoskeletal:No joint swelling or LOM    Psychiatric:Affect normal.                              10.7   16.40 )-----------( 222      ( 13 Dec 2021 01:42 )             30.9       12-13    133<L>  |  98  |  9   ----------------------------<  98  3.7   |  24  |  0.59    Ca    9.2      13 Dec 2021 14:19  Phos  2.2     12-13  Mg     1.9     12-13    TPro  7.2  /  Alb  3.0<L>  /  TBili  0.6  /  DBili  x   /  AST  32  /  ALT  21  /  AlkPhos  117  12-13      RECENT CULTURES:  MRSA PCR Result.: NotDetec: MRSA/MSSA PCR assay is a qualitative in vitro diagnostic test for the  direct detection and differentiation of methicillin-resistant  Staphylococcus aureus (MRSA) from Staphylococcus aureus (SA). The assay  detects DNA from nasal swabs in patients atrisk for nasal colonization.  It is not intended to diagnose MRSA or SA infections nor guide or monitor  treatment for MRSA/SA infections. A negative result does not preclude  nasal colonization. The Real-Time PCR assay is FDA-approved, and its  performance has been established by [facility], [place], [place]. (12.13.21 @ 07:59)    COVID-19 PCR . (12.13.21 @ 04:07)    COVID-19 PCR: Detected: You can help in the fight against COVID-19. [facility] may contact  you to see if you are interested in voluntarily participating in one of  our clinical trials.  Testing is performed using polymerase chain reaction (PCR) or  transcription mediated amplification (TMA). This COVID-19 (SARS-CoV-2)  nucleic acid amplification test was validated by [facility] and is  in use under the FDA Emergency Use Authorization (EUA) for clinical labs  CLIA-certified to perform high complexity testing. Test results should be  correlated with clinical presentation, patient history, and epidemiology.        RADIOLOGY:    < from: Xray Chest 1 View- PORTABLE-Urgent (Xray Chest 1 View- PORTABLE-Urgent .) (12.13.21 @ 02:10) >  FINDINGS:  Status post sternotomy. Partially visualized LVAD device in the left   lower chest. Loop recorder device.  The heart is normal in size.  The lungs are clear.  There is no pneumothorax or pleural effusion.  There are no acute osseous abnormalities.    IMPRESSION:  Clear lungs. No pleural effusion.    < end of copied text >      COVID-19 Nucleocapsid Antibody (12.13.21 @ 04:49)    COVID-19 Nucleocapsid Total PAT Antibody Result: 88.40: Roche ECLIA Nucleocapsid Total (PAT) AB  NOTE: This result index represents a total antibody measurement, which  includes IgG, IgA and IgM.  Measures Nucleocapsid  Negative <= 0.99 Index  Positive  >= 1.00 Index Index    COVID-19 Nucleocapsid PAT AB Interp: Positive: This test has not been reviewed by the FDA by the standard review  process. It has been authorized for emergency use by the FDA. [facility] has validated this test to be accurate.  Negative results do not rule out SARS-CoV-2 infection, particularly in  those who have been in recent contact with the virus. Follow-up testing  with a molecular diagnostic test should be considered to rule out  infection in these individuals. Results from antibody testing should not  be used as thesole basis to diagnose or exclude SARS-CoV-2 infection, or  to inform infection status.  Positive results may rarely be due to past or present infection with  non-SARS-CoV-2 coronovirus strains, such as coronovirus HKU1,  NL63,  OC43, A3 or 229E. [facility] through extensive  validation testing has confirmed that this risk in minimal with this test.      IMPRESSION:    Rx:   swelling in scrotum

## 2022-03-08 NOTE — PROGRESS NOTE ADULT - PROBLEM SELECTOR PLAN 2
requiring pressors titrated off and now on midodrine    due to serratia bacteremia-unclear source  bcx 3/2 NTD, afebrile since 3/1, Leukocytosis overall improved   CVP was low despite albumin and IVF in CICU   ?purulent sputum coming out from previous trach site - evaluated by ENT and felt no signs of infection   CXR no PNA  CTH unrevealing  LVAD exit sites shows no signs of infection  Perc dawn tube site shows no signs of infection  CT showed abdominal aortic thrombus (?infected) and a 2.6cm splenic lesion (?abscess)  initially on vancomycin (2/28-3/1) and now on cefepime alone (2/28 - )  ID following

## 2022-03-08 NOTE — PROGRESS NOTE ADULT - SUBJECTIVE AND OBJECTIVE BOX
Subjective:  - moved out of CICU this morning to [facility]  - appears more alert and responsive this morning, following commands    Medications:  cefepime   IVPB 1000 milliGRAM(s) IV Intermittent every 12 hours  chlorhexidine 2% Cloths 1 Application(s) Topical <User Schedule>  cholecalciferol 2000 Unit(s) Oral daily  insulin lispro (ADMELOG) corrective regimen sliding scale   SubCutaneous every 6 hours  methimazole 10 milliGRAM(s) Oral daily  mexiletine 150 milliGRAM(s) Oral every 8 hours  midodrine 10 milliGRAM(s) Oral every 8 hours  multivitamin 1 Tablet(s) Oral daily  pantoprazole  Injectable 40 milliGRAM(s) IV Push every 12 hours  senna 2 Tablet(s) Oral at bedtime  sertraline 100 milliGRAM(s) Oral daily      ICU Vital Signs Last 24 Hrs  T(C): 36.6, Max: 37 ( @ 16:00)  HR: 81 (72 - 96)  BP: --  BP(mean): 98 (98 - 98)  ABP: 104/76 (82/64 - 111/83)  ABP(mean): 88 (73 - 96)  RR: 20 (19 - 25)  SpO2: 98% (97% - 99%)    Weight in k.3 (22)  Weight in k.3 (22)      I&O's Summary Last 24 Hrs    IN: 1870 mL / OUT: 2310 mL / NET: -440 mL      Tele: SR 70-90s    Physical Exam:    General: No distress. Comfortable. Frail.  HEENT: EOM intact. Open prior tracheostomy site covered with gauze  Neck: Neck supple. JVP not elevated. No masses  Chest: Clear to auscultation bilaterally  CV: LVAD hum. No palpable pulses.  Abdomen: Soft, non-distended, non-tender. LVAD driveline site with dressing c/d/i. perc choley drain in place with bilious output.  Skin: warm peripherally.  Neurology: Alert and following commands  Psych: MARELY, nonverbal    LVAD heart mate 2  Speed 9200  Flow 5.3  PI 6.2  Power 5.7  No PI events, no changes made  DLES dressing C/D/I    Labs:    ( 22 @ 04:59 )               9.3    15.80 )--------( 218                  29.9     ( 22 @ 04:59 )     135  |  102  |  15  ---------------------<  145  4.6  |  25  |  0.99    Ca 8.9  Phos 3.1  Mg 1.9    ( 22 @ 04:59 )  TPro  7.8  /  Alb  2.9  /  TBili  0.3  /  DBili  x   /  AST  40  /  ALT  24  /  AlkPhos  156  ( 22 @ 05:00 )  TPro  7.4  /  Alb  2.7  /  TBili  0.2  /  DBili  x   /  AST  24  /  ALT  16  /  AlkPhos  132    PTT/PT/INR - ( 22 @ 04:59 )  PTT: 28.7 sec / PT: x     / INR: x        ( 22 @ 23:13 )  TropHS 24    / CK x     / CKMB x        Serum Pro-Brain Natriuretic Peptide: 59428 (22 @ 05:00)    VBG - ( 22 @ 04:56 )  pH: 7.39  / pCO2: 49    / pO2: 50    / Oxygen saturation: 82.2      Blood Gas Venous - Lactate: 0.7 (22 @ 04:56)    Lactate Dehydrogenase, Serum: 201 (22 @ 05:00)  Lactate Dehydrogenase, Serum: 199 (22 @ 04:34)

## 2022-03-08 NOTE — PROGRESS NOTE ADULT - ATTENDING COMMENTS
67 y/o M with HM3 LVAD, well known to our service. Admitted with sepsis and AMS. Found to have recurrent bacteremia, now on appropriate antibiotics. Remains non-verbal but squeezing hands to command.  For now, continue treatment of bacteremia. Will try to wean off Midodrine.   Ongoing discussions with Palliative Care and family with respect to goals of care. Currently being fed through NG tube but will need to transition to PEG soon if family agrees to it.  Prognosis guarded.

## 2022-03-08 NOTE — PROGRESS NOTE ADULT - SUBJECTIVE AND OBJECTIVE BOX
RICKY HAMPTON  MRN-[id number]  Patient is a 66y old  Male who presents with a chief complaint of Septic shock (07 Mar 2022 20:10)    HPI:  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 s/p HM2 LVAD in 9/2017 at  (due to severe PAD) with TV ring, multiple GI bleeds, thus maintained off AC, CKD 3prior COVID-19 infection in 4/2020, recurrent syncope post LVAD s/p ILR, s/p prolonged complex hospitalization from 6/14-11/16/2021 initially admitted with abdominal pain complicated by GIB, respiratory failure s/p trach, multiple infections, s/p cholecystotomy tube and SMA stent 10/2021, ultimately discharged to [facility] rehab and then returned to [facility] for trach decannulation 12/2, readmitted in 2/2022 with recurrent COVID-19 infection, initially in distributive shock. Blood cultures were also positive for serratia marcescens which he has had in the past.     Pt was transferred to [facility] CICU from [facility]. He was sent to their ED from a rehab center due to AMS and tachycardia. At [facility], they noted a WBC greater than 20, tachycardia and hypercarbia on ABG. Pt was placed on BiPAP and transferred to [facility] CICU for concern for sepsis vs septic shock.      (27 Feb 2022 22:20)      Hospital course:    Today:    Physical Exam:  Vital Signs Last 24 Hrs  T(C): 36.6 (08 Mar 2022 03:00), Max: 37 (07 Mar 2022 16:00)  T(F): 97.9 (08 Mar 2022 03:00), Max: 98.6 (07 Mar 2022 16:00)  HR: 78 (08 Mar 2022 06:00) (75 - 96)  BP: --  BP(mean): --  RR: 21 (08 Mar 2022 06:00) (19 - 26)  SpO2: 98% (08 Mar 2022 06:00) (96% - 99%)    PHYSICAL EXAM:  Constitutional: Patient laying in bed, NAD  Head: Atraumatic, normocephalic  Eyes: No scleral icterus. PERRLA. EOMI  ENMT: Moist mucous membrane. Uvula midline  Neck: Supple, No JVD  Respiratory: CTA B/L. No wheezes, rales or rhonchi   Cardiovascular: S1/S2. No murmurs, rubs, or gallops.  Gastrointestinal: Nondistended, BSx4, soft, nontender.  Extremities: Moves all extremities. Warm, no edema, nontender  Vascular: 2+ DP/PT pulses B/L   Neurological: A&Ox3. Follows commands. No focal deficits.   Skin: No rashes on exposed skin     ============================I/O===========================   I&O's Detail    06 Mar 2022 07:01  -  07 Mar 2022 07:00  --------------------------------------------------------  IN:    Enteral Tube Flush: 40 mL    Jevity 1.2: 1320 mL    Lactated Ringers: 950 mL  Total IN: 2310 mL    OUT:    Indwelling Catheter - Urethral (mL): 1735 mL    Vasopressin: 0 mL  Total OUT: 1735 mL    Total NET: 575 mL      07 Mar 2022 07:01  -  08 Mar 2022 06:34  --------------------------------------------------------  IN:    Enteral Tube Flush: 200 mL    IV PiggyBack: 350 mL    Jevity 1.2: 1320 mL  Total IN: 1870 mL    OUT:    Drain (mL): 15 mL    Indwelling Catheter - Urethral (mL): 2295 mL  Total OUT: 2310 mL    Total NET: -440 mL        ============================ LABS =========================                        9.3    15.80 )-----------( 218      ( 08 Mar 2022 04:59 )             29.9     03-08    135  |  102  |  15  ----------------------------<  145<H>  4.6   |  25  |  0.99    Ca    8.9      08 Mar 2022 04:59  Phos  3.1     03-08  Mg     1.9     03-08    TPro  7.8  /  Alb  2.9<L>  /  TBili  0.3  /  DBili  x   /  AST  40  /  ALT  24  /  AlkPhos  156<H>  03-08    LIVER FUNCTIONS - ( 08 Mar 2022 04:59 )  Alb: 2.9 g/dL / Pro: 7.8 g/dL / ALK PHOS: 156 U/L / ALT: 24 U/L / AST: 40 U/L / GGT: x           PTT - ( 08 Mar 2022 04:59 )  PTT:28.7 sec      ======================Micro/Rad/Cardio=================  Culture: Reviewed   CXR: Reviewed  Echo:Reviewed  ======================================================  PAST MEDICAL & SURGICAL HISTORY:  CHF (congestive heart failure)    CAD (coronary artery disease)    Depression    Pleural effusion    History of 2019 novel coronavirus disease (COVID-19)  april 2020    Hemorrhoids    Bleeding hemorrhoids    Peripheral arterial disease    Claudication    BPH with urinary obstruction    ACC/AHA stage D systolic heart failure    Anticoagulation goal of INR 2.0 to 2.5    Falls    Clavicle fracture    CKD (chronic kidney disease), stage III    Iron deficiency anemia    H/O epistaxis    Vertigo    GI bleed    S/P TVR (tricuspid valve repair)    S/P ventricular assist device    S/P endoscopy    H/O tracheostomy      ====================ASSESSMENT ==============  65M PMH LVAD, recurrent serratia bacteremia, pneumonia, intubated/trach x2 cycles, chronic gall bladder disease s/p percutaneous cholecystectomy, SMA ischemia s/p stent, cachexia who presented from [facility] for septic shock    Plan:  ====================== NEUROLOGY=====================  AMS  - per nursing home staff pt has been altered for one week  - pt AAOx0, not verbally responding to staff  - continue to monitor neuro status  - CTH at OSH negative for acute pathology   - 2/28 CTH: Mild to mod white matter microvascular ischemic disease. No acute pathology   - 3/3 CTH: Chronic microvascular changes, no acute pathology   - unable to get Brain MRI 2/2 LVAD.   - vEEG: generalized slowing and no evidence for focal slowing, epileptiform discharges, or seizures. Now d/c   - c/w sertraline   - Neuro following, recs appreciated. Will send ammonia and B1 levels. Start Vit D       ==================== RESPIRATORY======================  Hypercapnic Respiratory failure   - Pt hypercarbic at OSH, placed on BiPAP, now weaned off  - appears comfortable now on room air   - continue to monitor respiratory status     Hx trach w/ stoma  - Pt trach x2 cycles in past  - most recently in setting of COVID PNA in 02/2022  - Pt decannulated after admission  - purulent drainage in stoma site, cultured pending results  - ENT states no sign of infection at Stoma site- Plan for possible TE fistula closure when pt is stable as per ENT. Spoke to ENT 3/7 in regards to fistula closure and states will do as an outpatient once off supplemental oxygen for several weeks. No plan for any closure while inpatient.        ====================CARDIOVASCULAR==================  ACC/AHA stage D systolic heart failure s/p LVAD + Distributive Shock   - pt initially appeared hypovolemic on admission  - on and off pressor support w/ Vaso for MAP goal > 70   -  c/w Midodrine 20 TID to maintain MAP >70   - central sat sent from intro 81.8 with negative lactate x4    Ventricular Tachycardia  - pt has history of VT on previous admissions, takes home mexiletine  - significant NSVT and VT seen on tele in CICU overnight and this AM  - s/p lido gtt- weaned off 3/5 afternoon   - restarted home Mexitil 150 TID   - monitor on tele, monitor lytes   - EP following     Aortic thrombus  - enlarging aortic thrombus on CT CAP. patient has a history of recurrent GI bleeds and was unable to tolerate AC outpatient. Therefore risk/benefit does not favor AC.  - Monitor off AC at this time     ===================HEMATOLOGIC/ONC ===================  Anemia  - Hgb on admission 7.4 with subsequent drop to 6.6  - s/p 2u PRBC 2/28. Last pRBC transfused 3/6 at 3am. Will check cbc q12-24  - of note, pt has hx of GI bleeds  - no blood noted in BM, continue to monitor   - transfuse as needed if hgb <7    ===================== RENAL =========================  RASHAWN, resolved   - serum Cr downtrended and now stable at baseline   - continue to monitor SCr, BUN, lytes, and I&Os  - replete lytes prn with goal K 4-4.5 and mg >2    ==================== GASTROINTESTINAL===================  Hx GI bleed in past req multiple transfusions   - no overt bleeding noted on this admission   - monitor BM for signs of blood. h/h stable for past 24 hrs without any blood noted in BM  - c/w PPI  - Bowel regimen with Senna    Diet  - c/w Jevity 55cc/hr as tolerated  - Consider PEG placement, discuss GOC w/ Fam      =======================    ENDOCRINE  =====================  Hx Hyperthyroidism   - continue home methimazole     Hyperglycemia   -maintain q6h ISS while on continuous enteral feeds       ========================INFECTIOUS DISEASE================  Septic shock  - pt got zosyn and vancomycin x1 at OSH. switched from zosyn to cefepime. s/p vanco-, d/c yesterday as BCX with serratia.   - BCX 2/28 gram neg rods x4 bottles-- growing serratia sensitive to Cefepime/Erta   - f/u stoma culture sent 3/5- Sputum culture neg   - 3/2 BCX NGTD x2, pending 3/6 BCX  - CT C/A/P 3/3 showing incr size of abd aortic thrombus (infected thrombus?)  - c/w Cefepime 1g q12h 2/28- , may need 4-6wk of therapy if this is intravascular infection. PICC placement needed- request put in for today       GOC  - DNR/DNI  - Consider PEG placement, discuss GOC w/ Fam  - Palliative following, recs appreciated         Patient requires continuous monitoring with bedside rhythm monitoring, arterial line, pulse oximetry, ventilator monitoring and intermittent blood gas analysis.  Care plan discussed with ICU care team.  Patient remained critical; required more than usual post op care; I have spent >45 minutes providing non-routine post op care, in addition to intial critical time provided by CICU attending Dr. Machado , re-evaluated multiple times during the day.         RICKY HAMPTON  MRN-[id number]  Patient is a 66y old  Male who presents with a chief complaint of Septic shock (07 Mar 2022 20:10)    HPI:  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 s/p HM2 LVAD in 9/2017 at  (due to severe PAD) with TV ring, multiple GI bleeds, thus maintained off AC, CKD 3prior COVID-19 infection in 4/2020, recurrent syncope post LVAD s/p ILR, s/p prolonged complex hospitalization from 6/14-11/16/2021 initially admitted with abdominal pain complicated by GIB, respiratory failure s/p trach, multiple infections, s/p cholecystotomy tube and SMA stent 10/2021, ultimately discharged to [facility] rehab and then returned to [facility] for trach decannulation 12/2, readmitted in 2/2022 with recurrent COVID-19 infection, initially in distributive shock. Blood cultures were also positive for serratia marcescens which he has had in the past.     Pt was transferred to [facility] CICU from [facility]. He was sent to their ED from a rehab center due to AMS and tachycardia. At [facility], they noted a WBC greater than 20, tachycardia and hypercarbia on ABG. Pt was placed on BiPAP and transferred to [facility] CICU for concern for sepsis vs septic shock.      (27 Feb 2022 22:20)    Today:  No acute events overnight     Physical Exam:  Vital Signs Last 24 Hrs  T(C): 36.6 (08 Mar 2022 03:00), Max: 37 (07 Mar 2022 16:00)  T(F): 97.9 (08 Mar 2022 03:00), Max: 98.6 (07 Mar 2022 16:00)  HR: 78 (08 Mar 2022 06:00) (75 - 96)  RR: 21 (08 Mar 2022 06:00) (19 - 26)  SpO2: 98% (08 Mar 2022 06:00) (96% - 99%)    PHYSICAL EXAM:  Constitutional: Patient laying in bed, NAD  Head: Atraumatic, normocephalic  Eyes: No scleral icterus. PERRLA.  ENMT: Dry mucous membrane. Uvula midline. Stoma  dry appearing  Neck: Supple, No JVD  Respiratory: CTA B/L although overall decreased breath sounds 2/2 poor effort. No wheezes, rales or rhonchi   Cardiovascular: Mechanical sounds heard. LVAD dressing c/d/i.   Gastrointestinal: Nondistended, BSx4, soft, nontender. Cholecystectomy tube dressing c/d/i.   Extremities: Moves all extremities. Warm, no edema, nontender  Vascular: 2+ DP/PT pulses B/L   Neurological: Awake, aphasic, not answering questions. Intermittently Follows simple commands. B/L weakness in lower and upper extremities   Skin: No rashes on exposed skin   ============================I/O===========================   I&O's Detail    06 Mar 2022 07:01  -  07 Mar 2022 07:00  --------------------------------------------------------  IN:    Enteral Tube Flush: 40 mL    Jevity 1.2: 1320 mL    Lactated Ringers: 950 mL  Total IN: 2310 mL    OUT:    Indwelling Catheter - Urethral (mL): 1735 mL    Vasopressin: 0 mL  Total OUT: 1735 mL    Total NET: 575 mL      07 Mar 2022 07:01  -  08 Mar 2022 06:34  --------------------------------------------------------  IN:    Enteral Tube Flush: 200 mL    IV PiggyBack: 350 mL    Jevity 1.2: 1320 mL  Total IN: 1870 mL    OUT:    Drain (mL): 15 mL    Indwelling Catheter - Urethral (mL): 2295 mL  Total OUT: 2310 mL    Total NET: -440 mL        ============================ LABS =========================                        9.3    15.80 )-----------( 218      ( 08 Mar 2022 04:59 )             29.9     03-08    135  |  102  |  15  ----------------------------<  145<H>  4.6   |  25  |  0.99    Ca    8.9      08 Mar 2022 04:59  Phos  3.1     03-08  Mg     1.9     03-08    TPro  7.8  /  Alb  2.9<L>  /  TBili  0.3  /  DBili  x   /  AST  40  /  ALT  24  /  AlkPhos  156<H>  03-08    LIVER FUNCTIONS - ( 08 Mar 2022 04:59 )  Alb: 2.9 g/dL / Pro: 7.8 g/dL / ALK PHOS: 156 U/L / ALT: 24 U/L / AST: 40 U/L / GGT: x           PTT - ( 08 Mar 2022 04:59 )  PTT:28.7 sec      ======================Micro/Rad/Cardio=================  Culture: Reviewed   CXR: Reviewed  Echo:Reviewed  ======================================================  PAST MEDICAL & SURGICAL HISTORY:  CHF (congestive heart failure)    CAD (coronary artery disease)    Depression    Pleural effusion    History of 2019 novel coronavirus disease (COVID-19)  april 2020    Hemorrhoids    Bleeding hemorrhoids    Peripheral arterial disease    Claudication    BPH with urinary obstruction    ACC/AHA stage D systolic heart failure    Anticoagulation goal of INR 2.0 to 2.5    Falls    Clavicle fracture    CKD (chronic kidney disease), stage III    Iron deficiency anemia    H/O epistaxis    Vertigo    GI bleed    S/P TVR (tricuspid valve repair)    S/P ventricular assist device    S/P endoscopy    H/O tracheostomy      ====================ASSESSMENT ==============  65M PMH LVAD, recurrent serratia bacteremia, pneumonia, intubated/trach x2 cycles, chronic gall bladder disease s/p percutaneous cholecystectomy, SMA ischemia s/p stent, cachexia who presented from [facility] for septic shock    Plan:  ====================== NEUROLOGY=====================  AMS  - per nursing home staff pt has been altered for one week  - pt AAOx0, not verbally responding to staff  - continue to monitor neuro status  - CTH at OSH negative for acute pathology   - 2/28 CTH: Mild to mod white matter microvascular ischemic disease. No acute pathology   - 3/3 CTH: Chronic microvascular changes, no acute pathology   - unable to get Brain MRI 2/2 LVAD.   - vEEG: generalized slowing and no evidence for focal slowing, epileptiform discharges, or seizures. Now d/c   - c/w sertraline   - Neuro following, recs appreciated. Will send ammonia and B1 levels. Start Vit D       ==================== RESPIRATORY======================  Hypercapnic Respiratory failure   - Pt hypercarbic at OSH, placed on BiPAP, now weaned off  - appears comfortable now on room air   - continue to monitor respiratory status     Hx trach w/ stoma  - Pt trach x2 cycles in past  - most recently in setting of COVID PNA in 02/2022  - Pt decannulated after admission  - purulent drainage in stoma site, cultured pending results  - ENT states no sign of infection at Stoma site- Plan for possible TE fistula closure when pt is stable as per ENT. Spoke to ENT 3/7 in regards to fistula closure and states will do as an outpatient once off supplemental oxygen for several weeks. No plan for any closure while inpatient.        ====================CARDIOVASCULAR==================  ACC/AHA stage D systolic heart failure s/p LVAD + Distributive Shock   - pt initially appeared hypovolemic on admission  - on and off pressor support w/ Vaso for MAP goal > 70   -  c/w Midodrine 20 TID to maintain MAP >70   - central sat sent from intro 81.8 with negative lactate x4    Ventricular Tachycardia  - pt has history of VT on previous admissions, takes home mexiletine  - significant NSVT and VT seen on tele in CICU overnight and this AM  - s/p lido gtt- weaned off 3/5 afternoon   - restarted home Mexitil 150 TID   - monitor on tele, monitor lytes   - EP following     Aortic thrombus  - enlarging aortic thrombus on CT CAP. patient has a history of recurrent GI bleeds and was unable to tolerate AC outpatient. Therefore risk/benefit does not favor AC.  - Monitor off AC at this time     ===================HEMATOLOGIC/ONC ===================  Anemia  - Hgb on admission 7.4 with subsequent drop to 6.6  - s/p 2u PRBC 2/28. Last pRBC transfused 3/6 at 3am. Will check cbc q12-24  - of note, pt has hx of GI bleeds  - no blood noted in BM, continue to monitor   - transfuse as needed if hgb <7    ===================== RENAL =========================  RASHAWN, resolved   - serum Cr downtrended and now stable at baseline   - continue to monitor SCr, BUN, lytes, and I&Os  - replete lytes prn with goal K 4-4.5 and mg >2    ==================== GASTROINTESTINAL===================  Hx GI bleed in past req multiple transfusions   - no overt bleeding noted on this admission   - monitor BM for signs of blood. h/h stable for past 24 hrs without any blood noted in BM  - c/w PPI  - Bowel regimen with Senna    Diet  - c/w Jevity 55cc/hr as tolerated  - Consider PEG placement, discuss GOC w/ Fam      =======================    ENDOCRINE  =====================  Hx Hyperthyroidism   - continue home methimazole     Hyperglycemia   -maintain q6h ISS while on continuous enteral feeds       ========================INFECTIOUS DISEASE================  Septic shock  - pt got zosyn and vancomycin x1 at OSH. switched from zosyn to cefepime. s/p vanco-, d/c yesterday as BCX with serratia.   - BCX 2/28 gram neg rods x4 bottles-- growing serratia sensitive to Cefepime/Erta   - f/u stoma culture sent 3/5- Sputum culture neg   - 3/2 BCX NGTD x2, pending 3/6 BCX  - CT C/A/P 3/3 showing incr size of abd aortic thrombus (infected thrombus?)  - c/w Cefepime 1g q12h 2/28- , may need 4-6wk of therapy if this is intravascular infection. PICC placement needed- request put in for today       GOC  - DNR/DNI  - Consider PEG placement, discuss GOC w/ Fam  - Palliative following, recs appreciated         Patient requires continuous monitoring with bedside rhythm monitoring, arterial line, pulse oximetry, ventilator monitoring and intermittent blood gas analysis.  Care plan discussed with ICU care team.  Patient remained critical; required more than usual post op care; I have spent >45 minutes providing non-routine post op care, in addition to intial critical time provided by CICU attending Dr. Machado , re-evaluated multiple times during the day.         RICKY HAMPTON  MRN-[id number]  Patient is a 66y old  Male who presents with a chief complaint of Septic shock (07 Mar 2022 20:10)    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 s/p HM2 LVAD in 9/2017 at  (due to severe PAD) with TV ring, multiple GI bleeds, thus maintained off AC, CKD 3prior COVID-19 infection in 4/2020, recurrent syncope post LVAD s/p ILR, s/p prolonged complex hospitalization from 6/14-11/16/2021 initially admitted with abdominal pain complicated by GIB, respiratory failure s/p trach, multiple infections, s/p cholecystotomy tube and SMA stent 10/2021, ultimately discharged to [facility] rehab and then returned to [facility] for trach decannulation 12/2, readmitted in 2/2022 with recurrent COVID-19 infection, initially in distributive shock. Blood cultures were also positive for serratia marcescens which he has had in the past.     Pt was transferred to [facility] CICU from [facility]. He was sent to their ED from a rehab center due to AMS and tachycardia. At [facility], they noted a WBC greater than 20, tachycardia and hypercarbia on ABG. Pt was placed on BiPAP and transferred to [facility] CICU for concern for sepsis vs septic shock.      (27 Feb 2022 22:20)    Today:  No acute events overnight     Physical Exam:  Vital Signs Last 24 Hrs  T(C): 36.6 (08 Mar 2022 03:00), Max: 37 (07 Mar 2022 16:00)  T(F): 97.9 (08 Mar 2022 03:00), Max: 98.6 (07 Mar 2022 16:00)  HR: 78 (08 Mar 2022 06:00) (75 - 96)  RR: 21 (08 Mar 2022 06:00) (19 - 26)  SpO2: 98% (08 Mar 2022 06:00) (96% - 99%)    PHYSICAL EXAM:  Constitutional: Patient laying in bed, NAD  Head: Atraumatic, normocephalic  Eyes: No scleral icterus. PERRLA.  ENMT: Dry mucous membrane. Uvula midline. Stoma  dry appearing  Neck: Supple, No JVD  Respiratory: CTA B/L although overall decreased breath sounds 2/2 poor effort. No wheezes, rales or rhonchi   Cardiovascular: Mechanical sounds heard. LVAD dressing c/d/i.   Gastrointestinal: Nondistended, BSx4, soft, nontender. Cholecystectomy tube dressing c/d/i.   Extremities: Moves all extremities. Warm, no edema, nontender  Vascular: 2+ DP/PT pulses B/L   Neurological: Awake, aphasic, not answering questions. Intermittently Follows simple commands. B/L weakness in lower and upper extremities   Skin: No rashes on exposed skin   ============================I/O===========================   I&O's Detail    06 Mar 2022 07:01  -  07 Mar 2022 07:00  --------------------------------------------------------  IN:    Enteral Tube Flush: 40 mL    Jevity 1.2: 1320 mL    Lactated Ringers: 950 mL  Total IN: 2310 mL    OUT:    Indwelling Catheter - Urethral (mL): 1735 mL    Vasopressin: 0 mL  Total OUT: 1735 mL    Total NET: 575 mL      07 Mar 2022 07:01  -  08 Mar 2022 06:34  --------------------------------------------------------  IN:    Enteral Tube Flush: 200 mL    IV PiggyBack: 350 mL    Jevity 1.2: 1320 mL  Total IN: 1870 mL    OUT:    Drain (mL): 15 mL    Indwelling Catheter - Urethral (mL): 2295 mL  Total OUT: 2310 mL    Total NET: -440 mL        ============================ LABS =========================                        9.3    15.80 )-----------( 218      ( 08 Mar 2022 04:59 )             29.9     03-08    135  |  102  |  15  ----------------------------<  145<H>  4.6   |  25  |  0.99    Ca    8.9      08 Mar 2022 04:59  Phos  3.1     03-08  Mg     1.9     03-08    TPro  7.8  /  Alb  2.9<L>  /  TBili  0.3  /  DBili  x   /  AST  40  /  ALT  24  /  AlkPhos  156<H>  03-08    LIVER FUNCTIONS - ( 08 Mar 2022 04:59 )  Alb: 2.9 g/dL / Pro: 7.8 g/dL / ALK PHOS: 156 U/L / ALT: 24 U/L / AST: 40 U/L / GGT: x           PTT - ( 08 Mar 2022 04:59 )  PTT:28.7 sec      ======================Micro/Rad/Cardio=================  Culture: Reviewed   CXR: Reviewed  Echo:Reviewed  ======================================================  PAST MEDICAL & SURGICAL HISTORY:  CHF (congestive heart failure)    CAD (coronary artery disease)    Depression    Pleural effusion    History of 2019 novel coronavirus disease (COVID-19)  april 2020    Hemorrhoids    Bleeding hemorrhoids    Peripheral arterial disease    Claudication    BPH with urinary obstruction    ACC/AHA stage D systolic heart failure    Anticoagulation goal of INR 2.0 to 2.5    Falls    Clavicle fracture    CKD (chronic kidney disease), stage III    Iron deficiency anemia    H/O epistaxis    Vertigo    GI bleed    S/P TVR (tricuspid valve repair)    S/P ventricular assist device    S/P endoscopy    H/O tracheostomy      ====================ASSESSMENT ==============  65M PMH LVAD, recurrent serratia bacteremia, pneumonia, intubated/trach x2 cycles, chronic gall bladder disease s/p percutaneous cholecystectomy, SMA ischemia s/p stent, cachexia who presented from [facility] for septic shock    Plan:  ====================== NEUROLOGY=====================  AMS  - per nursing home staff pt has been altered for one week  - pt AAOx0, not verbally responding to staff  - continue to monitor neuro status  - CTH at OSH negative for acute pathology   - 2/28 CTH: Mild to mod white matter microvascular ischemic disease. No acute pathology   - 3/3 CTH: Chronic microvascular changes, no acute pathology   - unable to get Brain MRI 2/2 LVAD.   - vEEG: generalized slowing and no evidence for focal slowing, epileptiform discharges, or seizures. Now d/c   - c/w sertraline   - Neuro following, recs appreciated. Ammonia wnl       ==================== RESPIRATORY======================  Hypercapnic Respiratory failure   - Pt hypercarbic at OSH, placed on BiPAP, now weaned off  - appears comfortable now on room air   - continue to monitor respiratory status     Hx trach w/ stoma  - Pt trach x2 cycles in past  - most recently in setting of COVID PNA in 02/2022  - Pt decannulated after admission  - purulent drainage in stoma site, cultured pending results  - ENT states no sign of infection at Stoma site- Plan for possible TE fistula closure when pt is stable as per ENT. Spoke to ENT 3/7 in regards to fistula closure and states will do as an outpatient once off supplemental oxygen for several weeks. No plan for any closure while inpatient.        ====================CARDIOVASCULAR==================  ACC/AHA stage D systolic heart failure s/p LVAD + Distributive Shock   - pt initially appeared hypovolemic on admission  - on and off pressor support w/ Vaso for MAP goal > 70   -  c/w Midodrine 20 TID to maintain MAP >70   - VBG overnight with negative lactate     Ventricular Tachycardia  - pt has history of VT on previous admissions, takes home mexiletine  - significant NSVT and VT seen on tele in CICU overnight and this AM  - s/p lido gtt- weaned off 3/5 afternoon   - restarted home Mexitil 150 TID   - monitor on tele, monitor lytes   - EP following     Aortic thrombus  - enlarging aortic thrombus on CT CAP. patient has a history of recurrent GI bleeds and was unable to tolerate AC outpatient. Therefore risk/benefit does not favor AC.  - Monitor off AC at this time     ===================HEMATOLOGIC/ONC ===================  Anemia  - Hgb on admission 7.4 with subsequent drop to 6.6  - s/p 2u PRBC 2/28. Last pRBC transfused 3/6 at 3am. Will check cbc q12-24  - of note, pt has hx of GI bleeds  - no blood noted in BM, continue to monitor   - transfuse as needed if hgb <7    ===================== RENAL =========================  RASHAWN, resolved   - serum Cr downtrended and now stable at baseline   - continue to monitor SCr, BUN, lytes, and I&Os  - replete lytes prn with goal K 4-4.5 and mg >2    ==================== GASTROINTESTINAL===================  Hx GI bleed in past req multiple transfusions   - no overt bleeding noted on this admission   - monitor BM for signs of blood. h/h stable for past 24 hrs without any blood noted in BM  - c/w PPI  - Bowel regimen with Senna    Diet  - c/w Jevity 55cc/hr as tolerated  - Consider PEG placement, discuss GOC w/ Fam      =======================    ENDOCRINE  =====================  Hx Hyperthyroidism   - continue home methimazole     Hyperglycemia   -maintain q6h ISS while on continuous enteral feeds       ========================INFECTIOUS DISEASE================  Septic shock  - pt got zosyn and vancomycin x1 at OSH. switched from zosyn to cefepime. s/p vanco-, d/c yesterday as BCX with serratia.   - BCX 2/28 gram neg rods x4 bottles-- growing serratia sensitive to Cefepime/Erta   - f/u stoma culture sent 3/5- Sputum culture neg   - 3/2 BCX NG- final x2, pending 3/6 BCX  - CT C/A/P 3/3 showing incr size of abd aortic thrombus (infected thrombus?)  - c/w Cefepime 1g q12h 2/28- , may need 4-6wk of therapy if this is intravascular infection. PICC placement needed- request put in for today     GOC  - DNR/DNI  - Consider PEG placement, discuss GOC w/ Fam  - Palliative following, recs appreciated         Patient requires continuous monitoring with bedside rhythm monitoring, arterial line, pulse oximetry, ventilator monitoring and intermittent blood gas analysis.  Care plan discussed with ICU care team.  Patient remained critical; required more than usual post op care; I have spent >45 minutes providing non-routine post op care, in addition to intial critical time provided by CICU attending Dr. Machado , re-evaluated multiple times during the day.

## 2022-03-08 NOTE — PROGRESS NOTE ADULT - PROBLEM SELECTOR PLAN 3
EF 10%  s/p LVAD without s/s of pump malfunction  holding BB and spironolactone given low bp  recurrent NSVT/VT when he first arrived started on lidocaine infusion with improvement now back on mexiletine  HF team following

## 2022-03-08 NOTE — PROGRESS NOTE ADULT - SUBJECTIVE AND OBJECTIVE BOX
RICKY JOINT  MRN#: [id number]  Subjective: pulmonary progress note  : acute hypercapnic respiratory failure . Septic shock , service rendered on 2002   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 s/p HM2 LVAD in 2017 at  (due to severe PAD) with TV ring, multiple GI bleeds, thus maintained off AC, CKD 3prior COVID-19 infection in 2020, recurrent syncope post LVAD s/p ILR, s/p prolonged complex hospitalization from -2021 initially admitted with abdominal pain complicated by GIB, respiratory failure s/p trach, multiple infections, s/p cholecystotomy tube and SMA stent 10/2021, ultimately discharged to [facility] rehab and then returned to [facility] for trach decannulation , readmitted in 2022 with recurrent COVID-19 infection, initially in distributive shock. Blood cultures were also positive for serratia marcescens which he has had in the past. seen by heart failure team patient is DNR/DNI  continue on 3 liter nasal 02 and vasopressin, considered Merik  prtocol , off vasopressin , down grade to monitor bed          (2022 22:20)    PAST MEDICAL & SURGICAL HISTORY:  CHF (congestive heart failure)    CAD (coronary artery disease)  Depression    Pleural effusion    History of 2019 novel coronavirus disease (COVID-19)  2020    Hemorrhoids    Bleeding hemorrhoids    Peripheral arterial disease    Claudication    BPH with urinary obstruction    ACC/AHA stage D systolic heart failure    Anticoagulation goal of INR 2.0 to 2.5    Falls    Clavicle fracture    CKD (chronic kidney disease), stage III    Iron deficiency anemia    H/O epistaxis    Vertigo    GI bleed    S/P TVR (tricuspid valve repair)    S/P ventricular assist device    S/P endoscopy    H/O tracheostomy        FAMILY HISTORY:    Social History:    Marital Status:  X    (   ) Single    (   )    (  )   Occupation: Retired   Lives with: (  ) alone  (  ) children  X spouse   (  ) parents  (  ) other    Substance Use (street drugs): X never used  (  ) other:  Tobacco Usage: X never smoked   (   ) former smoker   (   ) current smoker  (     ) pack year  (    ) last cigarette date  Alcohol Usage: Social         OBJECTIVE:  ICU Vital Signs Last 24 Hrs  T(C): 37.2 (01 Mar 2022 16:00), Max: 38.4 (01 Mar 2022 09:00)  T(F): 99 (01 Mar 2022 16:00), Max: 101.1 (01 Mar 2022 09:00)  HR: 109 (01 Mar 2022 18:00) (67 - 124)  BP: --  BP(mean): 86 (2022 20:00) (86 - 86)  ABP: 89/74 (01 Mar 2022 18:00) (75/63 - 160/85)  ABP(mean): 81 (01 Mar 2022 18:00) (68 - 139)  RR: 38 (01 Mar 2022 18:00) (5 - 40)  SpO2: 99% (01 Mar 2022 18:00) (94% - 100%)       @ 07: @ 07:00  --------------------------------------------------------  IN: 1060.7 mL / OUT: 1600 mL / NET: -539.3 mL     @ 07: @ 18:22  --------------------------------------------------------  IN: 677.8 mL / OUT: 455 mL / NET: 222.8 mL    PHYSICAL EXAM :Daily Height in cm: 177.8 (2022 21:20)  Elderly frail male on 3 liter nasal cannula    Daily Weight in k.7 (2022 21:20)  HEENT:     + NCAT  + EOMI  - Conjuctival edema   - Icterus   - Thrush   - ETT  - NGT/OGT  Neck:         + FROM RT IJ  JVD     - Nodes     - Masses    + Mid-line trachea   - Tracheostomy  Chest:           mild kyphosis   Lungs:          + CTA  + Rhonchi  + Rales    - Wheezing  + Decreased BS   - Dullness R L  Cardiac:       + S1 + S2    + RRR   - Irregular   - S3  - S4    - Murmurs   - Rub   - Hamman’s sign   Abdomen:    + BS     + Soft    + Non-tender  - Distended  - Organomegaly  - PEG Cholecystostomy tube in place   Extremities:   - Cyanosis U/L   - Clubbing  U/L  - LE/UE Edema   + Capillary refill    + Pulses   Neuro:        + Awake   +  Alert   - Confused   - Lethargic   - Sedated   - Generalized Weakness  Skin:        - Rashes    - Erythema   + Normal incisions   + IV sites intact  -      HOSPITAL MEDICATIONS: All mediciations reviewed and analyzed  MEDICATIONS  (STANDING):  albumin human  5% IVPB 250 milliLiter(s) IV Intermittent once  cefepime   IVPB 2000 milliGRAM(s) IV Intermittent every 12 hours  chlorhexidine 2% Cloths 1 Application(s) Topical <User Schedule>  cholecalciferol 1000 Unit(s) Oral daily  dextrose 40% Gel 15 Gram(s) Oral once  dextrose 50% Injectable 25 Gram(s) IV Push once  gabapentin Solution 100 milliGRAM(s) Oral three times a day  glucagon  Injectable 1 milliGRAM(s) IntraMuscular once  insulin lispro (ADMELOG) corrective regimen sliding scale   SubCutaneous every 6 hours  magnesium sulfate  IVPB 1 Gram(s) IV Intermittent once  methimazole 10 milliGRAM(s) Oral daily  metoclopramide 10 milliGRAM(s) Oral four times a day  mexiletine 150 milliGRAM(s) Oral every 8 hours  mirtazapine 7.5 milliGRAM(s) Oral at bedtime  multivitamin 1 Tablet(s) Oral daily  pantoprazole  Injectable 40 milliGRAM(s) IV Push daily  potassium phosphate IVPB 15 milliMole(s) IV Intermittent once  senna 2 Tablet(s) Oral at bedtime  sertraline 100 milliGRAM(s) Oral daily  vancomycin  IVPB 750 milliGRAM(s) IV Intermittent every 24 hours  vasopressin Infusion 0.02 Unit(s)/Min (1.2 mL/Hr) IV Continuous <Continuous>    MEDICATIONS  (PRN):    LABS: All Lab data reviewed and analyzed                         9.3    1580 )-----------( 218      ( 08 Mar 2022 04:59 )             29.9   03-08    135  |  102  |  15  ----------------------------<  145<H>  4.6   |  25  |  0.99    Ca    8.9      08 Mar 2022 04:59  Phos  3.1     03-08  Mg     1.9     03-08    TPro  7.8  /  Alb  2.9<L>  /  TBili  0.3  /  DBili  x   /  AST  40  /  ALT  24  /  AlkPhos  156<H>        Ca    8.4      07 Mar 2022 05:00  Phos  2.4     03-07  Mg     1.8     -    TPro  7.4  /  Alb  2.7<L>  /  TBili  0.2  /  DBili  x   /  AST  24  /  ALT  16  /  AlkPhos  132<H>      Ca    8.4      05 Mar 2022 04:34  Phos  2.3     03-05  Mg     1.8     -    TPro  7.1  /  Alb  2.9<L>  /  TBili  0.3  /  DBili  x   /  AST  19  /  ALT  15  /  AlkPhos  128<H>             PTT - ( 01 Mar 2022 00:08 )  PTT:25.0 sec LIVER FUNCTIONS - ( 01 Mar 2022 17:38 )  Alb: 2.9 g/dL / Pro: 7.3 g/dL / ALK PHOS: 116 U/L / ALT: 30 U/L / AST: 29 U/L / GGT: x           RADIOLOGY: - Reviewed and analyzed

## 2022-03-09 NOTE — PROGRESS NOTE ADULT - PROBLEM SELECTOR PLAN 1
suspect secondary to infection with diffuse cerebral dysfunction.   CT head x 2 negative for acute infarct  unable to do MRI d/t LVAD  EEG negative for seizure activity  f/up neuro recs  ammonia low, B1 pending  home mirtazapine and gabapentin discontinued given lethargy  GOC discussion re: end of life care and feeding tube

## 2022-03-09 NOTE — PROGRESS NOTE ADULT - ATTENDING COMMENTS
67 y/o M with HM3 LVAD, well known to our service. Admitted with sepsis and AMS. Found to have recurrent bacteremia, now on appropriate antibiotics. Remains non-verbal but squeezing hands to command and nodding head.  For now, continue treatment of bacteremia. Will stop Midodrine today.   Ongoing discussions with Palliative Care and family with respect to goals of care. Currently being fed through NG tube but will need to transition to PEG soon if family agrees to it.  Prognosis guarded.

## 2022-03-09 NOTE — PROGRESS NOTE ADULT - SUBJECTIVE AND OBJECTIVE BOX
INFECTIOUS DISEASES FOLLOW UP-- Anitra Prater  [phone]    This is a follow up note for this  66yMale with  Sepsis  serratia bacteremia recurrent, driveline infection        ROS:  CONSTITUTIONAL:  opens eyes, tracks, does not answer questions  frail and cachectic appearing    Allergies    Amiodarone Hydrochloride (Other (Severe))    Intolerances        ANTIBIOTICS/RELEVANT:  antimicrobials  cefepime   IVPB 1000 milliGRAM(s) IV Intermittent every 12 hours    immunologic:    OTHER:  chlorhexidine 2% Cloths 1 Application(s) Topical <User Schedule>  cholecalciferol 2000 Unit(s) Oral daily  insulin lispro (ADMELOG) corrective regimen sliding scale   SubCutaneous every 6 hours  methimazole 10 milliGRAM(s) Oral daily  mexiletine 150 milliGRAM(s) Oral every 8 hours  multivitamin 1 Tablet(s) Oral daily  pantoprazole  Injectable 40 milliGRAM(s) IV Push every 12 hours  senna 2 Tablet(s) Oral at bedtime  sertraline 100 milliGRAM(s) Oral daily  traMADol 25 milliGRAM(s) Oral once      Objective:  Vital Signs Last 24 Hrs  T(C): 36.7 (09 Mar 2022 17:15), Max: 36.8 (09 Mar 2022 02:36)  T(F): 98 (09 Mar 2022 17:15), Max: 98.3 (09 Mar 2022 02:36)  HR: 97 (09 Mar 2022 17:15) (90 - 97)  BP: 94/68 (09 Mar 2022 17:15) (94/68 - 98/69)  BP(mean): 78 (09 Mar 2022 17:15) (78 - 94)  RR: 17 (09 Mar 2022 17:15) (17 - 18)  SpO2: 99% (09 Mar 2022 17:15) (98% - 99%)    PHYSICAL EXAM:  Constitutional:no acute distress, cachectic, awake non verbal  Eyes:ALONSO, EOMI  Ear/Nose/Throat: no oral lesions, 	  Respiratory: clear BL  Cardiovascular: S1S2, VAD sounds  Gastrointestinal:soft, (+) BS, no tenderness, cholecystotomy tube  Extremities:no e/e/c  No Lymphadenopathy  IV sites not inflammed.    LABS:                        10.0   13.54 )-----------( 219      ( 09 Mar 2022 05:52 )             30.2     03-09    131<L>  |  97  |  19  ----------------------------<  136<H>  5.5<H>   |  25  |  0.99    Ca    9.2      09 Mar 2022 10:52  Phos  2.9     03-09  Mg     1.9     03-09    TPro  8.3  /  Alb  3.0<L>  /  TBili  0.4  /  DBili  x   /  AST  35  /  ALT  23  /  AlkPhos  166<H>  03-09    PTT - ( 08 Mar 2022 04:59 )  PTT:28.7 sec      MICROBIOLOGY:            RECENT CULTURES:  03-06 @ 14:17  .Blood Blood-Arterial  --  --  --    No growth to date.  --  03-05 @ 15:33  .Sputum Trach Site  --  --  --    Normal Respiratory Bria present  --  03-02 @ 21:52  .Blood Blood  --  --  --    No Growth Final  --      RADIOLOGY & ADDITIONAL STUDIES:    < from: CT Abdomen and Pelvis w/ Oral Cont and w/ IV Cont (03.03.22 @ 20:54) >  IMPRESSION:  Small bilateral pleural effusions and small volume ascites, new from   prior exam. No drainable fluid collection.    A 2.6 m hypoattenuating splenic lesion is decreased.    Aortic thrombus narrowing the lumen at least 50% at the infrarenal   segment, increased from the prior exam.    < end of copied text >

## 2022-03-09 NOTE — PROGRESS NOTE ADULT - PROBLEM SELECTOR PLAN 1
2/28 BCx + serratia/GNR  - IV abx per ID. currently on cefepime.  - CT C/A/P showing small bilateral pleural effusions, small volume ascites, which is new, decreased splenic lesion, aortic thrombus narrowing the lumen at least 505 at the infrarenal segment, increased from prior  - home mirtazapine and gabapentin discontinued given lethargy  - appreciate palliative care input regarding GOC and PEG placement  - will follow ID recs regarding PICC line placement

## 2022-03-09 NOTE — PROGRESS NOTE ADULT - ASSESSMENT
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    S/p prolonged complex hospitalization from 6/14-11/16/2021 initially admitted with abdominal pain complicated by GIB, respiratory failure s/p trach, multiple infections, s/p cholecystotomy tube and SMA stent 10/2021, ultimately discharged to [facility] rehab and then returned to [facility] for trach decannulation 12/2. The patient had a recent admission with COVID 19 reinfection and distributive shock. That admission he had blood culture positive for serratia marcescens (discharged on levaquin).  He was treated with MAB, remdesivir, and steroids. He had recurrent VT and was started mexiletine.     Now coming in from [facility] ED with leukocytosis and AMS was treated for UTI. Initially required bipap but was weaned off here. Labs concerning here for WBC 26, hemoglobin of 7s then 6.7, creatinine of 1.3. His maps were initially in the 50s. Physical exam showing pus from stoma, sat of central access of 81.8, and tachycardia are all concerning for septic shock picture. He is s/p 1L fluids, 1uPRBCs, was placed on vaso. Patient has known episodes of VT and had recurrent NSVT/VT when he first arrived started on lidocaine infusion with improvement. Of note he was previously on mexiletine 150 TID outpatient, metoprolol ER 25 qAM and 50 qPM. Previous history of thyrotoxicosis on amiodarone.    He was found to be bacteremic with recurrent serratia, on IV abx possibly r/t LVAD infection vs splenic abscess noted on CT 1/19. Afebrile since 3/1. Leukocytosis improving. CVP was low despite albumin and IVF with transient improvement. s/p 1 unit PRBC 3/6. Vasopressin weaned off 3/4, started on midodrine. LVAD without s/s of pump malfunction. This morning he appears alert and is following some commands, but MS still not back to baseline. Overall, critically ill with guarded prognosis.

## 2022-03-09 NOTE — PROGRESS NOTE ADULT - PROBLEM SELECTOR PLAN 2
requiring pressors titrated off and now on midodrine, stopped.  due to serratia bacteremia-unclear source  bcx 3/2 NTD, afebrile since 3/1, Leukocytosis overall improved   CVP was low despite albumin and IVF in CICU   ?purulent sputum coming out from previous trach site - evaluated by ENT and felt no signs of infection   CXR no PNA  CTH unrevealing  LVAD exit sites shows no signs of infection  Perc dawn tube site shows no signs of infection  CT showed abdominal aortic thrombus (?infected) and a 2.6cm splenic lesion (?abscess)  initially on vancomycin (2/28-3/1) and now on cefepime alone (2/28 - )  ID following  -trend WBC

## 2022-03-09 NOTE — PROGRESS NOTE ADULT - ASSESSMENT
Assessment and Recommendation:   · Assessment	  Assessment and recommendation :  Acute hypercapnic respiratory Failure weaned of  positive pressure non invasive ventilator   Septic shock off vasopressin  Bacteremia with serratia marcescens  Severe anemia with low H/H   S/P transfuse one unit of Packed RBCs   Severe ischemic cardiomyopathy   ACC/AHA stage D systolic heart failure  Peripheral vascular Disease   PAF on no ACO   H/O of repeated GI bleeding   S/P mesenteric ischemia   GERD on reglan   S/P HM2 LVAD , VT on Mexiletine   hyperthyroidism on  methimazole   severe protein caloric malnutrition   severe neuropathy   Major depression   S/P tracheostomy X2  pan culture blood and urine done   Continue Antibiotic cefepime   NG tube feeding   GI prophylaxis   case discussed with CICU   patient is DNR/DNI

## 2022-03-09 NOTE — PROGRESS NOTE ADULT - SUBJECTIVE AND OBJECTIVE BOX
Subjective:  - NAEO  - opens eyes and follows some commands, but MS still not back to baseline    Medications:  cefepime   IVPB 1000 milliGRAM(s) IV Intermittent every 12 hours  chlorhexidine 2% Cloths 1 Application(s) Topical <User Schedule>  cholecalciferol 2000 Unit(s) Oral daily  insulin lispro (ADMELOG) corrective regimen sliding scale   SubCutaneous every 6 hours  methimazole 10 milliGRAM(s) Oral daily  mexiletine 150 milliGRAM(s) Oral every 8 hours  multivitamin 1 Tablet(s) Oral daily  pantoprazole  Injectable 40 milliGRAM(s) IV Push every 12 hours  senna 2 Tablet(s) Oral at bedtime  sertraline 100 milliGRAM(s) Oral daily  traMADol 25 milliGRAM(s) Oral once      ICU Vital Signs Last 24 Hrs  T(C): 36.6, Max: 36.8 ( @ 02:36)  HR: 90 (73 - 93)  BP: 98/69 (98/69 - 98/69)  BP(mean): 94 (79 - 96)  ABP: --  ABP(mean): --  RR: 17 (17 - 18)  SpO2: 99% (98% - 99%)    Weight in k.3 (22)  Weight in k.3 (22)      I&O's Summary Last 24 Hrs    IN: 1485 mL / OUT: 1900 mL / NET: -415 mL      Tele: SR 90-100s    Physical Exam:    General: No distress. Comfortable. Frail.  HEENT: EOM intact. Open prior tracheostomy site covered with gauze  Neck: Neck supple. JVP not elevated. No masses  Chest: Clear to auscultation bilaterally  CV: LVAD hum. No palpable pulses.  Abdomen: Soft, non-distended, non-tender. LVAD driveline site with dressing c/d/i. perc choley drain in place with bilious output.  Skin: warm peripherally.  Neurology: Alert and following some commands  Psych: MARELY, nonverbal    LVAD heart mate 2  Speed 9200  Flow 5.3  PI 6.5  Power 5.6  No PI events, no changes made  DLES dressing C/D/I      Labs:    ( 22 @ 05:52 )               10.0   13.54 )--------( 219                  30.2     ( 22 @ 04:57 )     132  |  98  |  19  ---------------------<  132  5.7  |  21  |  1.00    Ca 9.0  Phos 2.9  Mg 1.9    ( 22 @ 04:57 )  TPro  8.3  /  Alb  3.0  /  TBili  0.4  /  DBili  x   /  AST  35  /  ALT  23  /  AlkPhos  166  ( 22 @ 04:59 )  TPro  7.8  /  Alb  2.9  /  TBili  0.3  /  DBili  x   /  AST  40  /  ALT  24  /  AlkPhos  156    PTT/PT/INR - ( 22 @ 04:59 )  PTT: 28.7 sec / PT: x     / INR: x        ( 22 @ 23:13 )  TropHS 24    / CK x     / CKMB x        Serum Pro-Brain Natriuretic Peptide: 40934 (22 @ 04:57)    Lactate, Blood: 1.1 (22 @ 04:57)    Lactate Dehydrogenase, Serum: 274 (22 @ 04:57)  Lactate Dehydrogenase, Serum: 201 (22 @ 05:00)

## 2022-03-09 NOTE — PROGRESS NOTE ADULT - PROBLEM SELECTOR PLAN 4
Will continue to f/u for GOC and support.        Francisco Burgos MD   Geriatrics and Palliative Care (GAP) Consult Service    of Geriatric and Palliative Medicine  [facility]      Please page the following number for clinical matters between the hours of 9 am and 5 pm from Monday through Friday : [phone]    After 5pm and on weekends, please see the contact information below:    In the event of newly developing, evolving, or worsening symptoms, please contact the Palliative Medicine team via pager (if the patient is at [facility] #[phone] or if the patient is at [facility] #[phone]) The Geriatric and Palliative Medicine service has coverage 24 hours a day/ 7 days a week to provide medical recommendations regarding symptom management needs via telephone See my GOC note dated 3/3. However, in summary, GOC are for trying to manage his infection and to continue to do all interventions in order to try to prolong the patient's life and to improve the patient's condition; however, his family agreed with DNR/I.

## 2022-03-09 NOTE — PROGRESS NOTE ADULT - PROBLEM SELECTOR PLAN 4
- Speed 9200  - Daily LDH, currently stable  - MAP goal 70-80; MAPs improved today, so will stop midodrine  - no AC given history of GIB

## 2022-03-09 NOTE — PROGRESS NOTE ADULT - PROBLEM SELECTOR PLAN 5
Will continue to f/u for GOC and support.        Francisco Burgos MD   Geriatrics and Palliative Care (GAP) Consult Service    of Geriatric and Palliative Medicine  [facility]      Please page the following number for clinical matters between the hours of 9 am and 5 pm from Monday through Friday : [phone]    After 5pm and on weekends, please see the contact information below:    In the event of newly developing, evolving, or worsening symptoms, please contact the Palliative Medicine team via pager (if the patient is at [facility] #[phone] or if the patient is at [facility] #[phone]) The Geriatric and Palliative Medicine service has coverage 24 hours a day/ 7 days a week to provide medical recommendations regarding symptom management needs via telephone

## 2022-03-09 NOTE — PROGRESS NOTE ADULT - ASSESSMENT
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 s/p HM2 LVAD in 9/2017 at  (due to severe PAD) with TV ring, multiple GI bleeds, thus maintained off AC, CKD 3prior COVID-19 infection in 4/2020, recurrent syncope post LVAD s/p ILR, s/p prolonged complex hospitalization from 6/14-11/16/2021 initially admitted with abdominal pain complicated by GIB, respiratory failure s/p trach, multiple infections, s/p cholecystotomy tube and SMA stent 10/2021, ultimately discharged to [facility] rehab and then returned to [facility] for trach decannulation 12/2, readmitted in 2/2022 with recurrent COVID-19 infection, initially in distributive shock. Blood cultures were also positive for serratia marcescens which he has had in the past.   Pt was transferred to [facility] CICU from [facility]. He was sent to their ED from a rehab center due to AMS and tachycardia. At [facility], they noted a WBC greater than 20, tachycardia and hypercarbia on ABG. Pt was placed on BiPAP and transferred to [facility] CICU for  sepsis vs septic shock. Geriatrics and Palliative Medicine (GaP) Team was called for goals of care

## 2022-03-09 NOTE — PROGRESS NOTE ADULT - PROBLEM SELECTOR PLAN 1
On Abx and pressors as per the primary team and ID  As previously (3/1) d/w ID (Dr. Prater) sources of infection are probably the patient LVAD +/- Splenic abscess that due to the patient's advanced illness, hemodynamic instability, and frail state will not be eligible for source control (as d/w the LVAD team and HF, LVAD can not be removed and, as d/w Dr. Jensen, unlikely to be stable for IR drainage of splenic abscess). Hence, without source control, the patient's infections will continue to reoccur. However,  these recurrent infection are causing an increasing burden on the patient,  creating recurrent admissions, affecting his mental status, limiting his independency, and increasing his chances of mortality. All these element were discussed during a FM today and for details please see the stand alone GOC note enter by the Geriatrics and Palliative Medicine (GaP) Team  (Ashish Conley). However, in summary, GOC are for trying to manage his infection and to continue to do all interventions in order to try to prolong the patient's life and to improve the patient's condition; however, his family agreed with DNR/I. On tube feeds  Aspiration precautions.   I called the patient's HCP and son (Kang, [phone]) and d/w them about the patient's current state. I indicated that though the patient was more alert that I was not sure if he was able to eat in a safely way and that even if he was able to eat a dysphagia diet that I doubted he was going to be able to maintain enough caloric intake to sustain his life. I stated that, at this point, I was required for his HCP and anybody else involved into decision making to get together and decided if the patient may have wanted to continue to prolong his life under the current condition (bedbound, fully dependent on ADLs, likely to be dependent on tube feeds, and needing to leave on Abx. Understanding that, sooner rather than latter, a new infection will be happening, creating a set back or causing another life threatening situation, or his actual death) which is likely close to his baseline (if anything, and while is infection is controlled, he may be able to better communicate and maybe get pleasure feeds but I doubt he will be back to the functional and independency level he had prior to his last hospital DC). Cristina and Kang were going to discuss about my inputs and f/u with me tomorrow. They also understand that even if they decide for a PEG that then the patient will need to be assess by GI, IR, or Sx to determine if a PEG is technically possible. In order to give further info to his family a formal speech and swallow eval is recommended.    30' were spent in ACP

## 2022-03-09 NOTE — PROGRESS NOTE ADULT - SUBJECTIVE AND OBJECTIVE BOX
Patient is a 66y old  Male who presents with a chief complaint of Septic shock (09 Mar 2022 11:35)      INTERVAL History of Present Illness/OVERNIGHT EVENTS: ill man - NGT, PC cholecystostomy tube, condom urine cath, LVAD  nods with head    MEDICATIONS  (STANDING):  cefepime   IVPB 1000 milliGRAM(s) IV Intermittent every 12 hours  chlorhexidine 2% Cloths 1 Application(s) Topical <User Schedule>  cholecalciferol 2000 Unit(s) Oral daily  insulin lispro (ADMELOG) corrective regimen sliding scale   SubCutaneous every 6 hours  methimazole 10 milliGRAM(s) Oral daily  mexiletine 150 milliGRAM(s) Oral every 8 hours  multivitamin 1 Tablet(s) Oral daily  pantoprazole  Injectable 40 milliGRAM(s) IV Push every 12 hours  senna 2 Tablet(s) Oral at bedtime  sertraline 100 milliGRAM(s) Oral daily  traMADol 25 milliGRAM(s) Oral once    MEDICATIONS  (PRN):      Allergies    Amiodarone Hydrochloride (Other (Severe))    Intolerances        REVIEW OF SYSTEMS:  Negative unless otherwise specified above.    Vital Signs Last 24 Hrs  T(C): 36.4 (09 Mar 2022 15:00), Max: 36.8 (09 Mar 2022 02:36)  T(F): 97.6 (09 Mar 2022 15:00), Max: 98.3 (09 Mar 2022 02:36)  HR: 95 (09 Mar 2022 12:38) (90 - 95)  BP: 97/76 (09 Mar 2022 12:38) (97/76 - 98/69)  BP(mean): 84 (09 Mar 2022 12:38) (79 - 94)  RR: 18 (09 Mar 2022 12:38) (17 - 18)  SpO2: 99% (09 Mar 2022 12:38) (98% - 99%)        PHYSICAL EXAM:  GENERAL: No apparent distress, appears chronically ill  HEAD:  Atraumatic, Normocephalic  EYES: Conjunctiva and sclera clear, no discharge  ENMT: NGT, no discharge  NECK: Supple, +tracheostomy site dressing  CHEST/LUNG: Air entry bilaterally, no wheeze or rales  HEART: Regular rhythm  ABDOMEN: Soft, Nontender, +PC drain  EXTREMITIES:  No clubbing, cyanosis or edema  NERVOUS SYSTEM: Eyes open, nods to some commands, moves both arms      LABS:                        10.0   13.54 )-----------( 219      ( 09 Mar 2022 05:52 )             30.2     09 Mar 2022 10:52    131    |  97     |  19     ----------------------------<  136    5.5     |  25     |  0.99     Ca    9.2        09 Mar 2022 10:52  Phos  2.9       09 Mar 2022 04:57  Mg     1.9       09 Mar 2022 04:57    TPro  8.3    /  Alb  3.0    /  TBili  0.4    /  DBili  x      /  AST  35     /  ALT  23     /  AlkPhos  166    09 Mar 2022 04:57    PTT - ( 08 Mar 2022 04:59 )  PTT:28.7 sec    CAPILLARY BLOOD GLUCOSE      POCT Blood Glucose.: 152 mg/dL (09 Mar 2022 11:59)  POCT Blood Glucose.: 154 mg/dL (09 Mar 2022 05:39)  POCT Blood Glucose.: 125 mg/dL (08 Mar 2022 23:46)  POCT Blood Glucose.: 133 mg/dL (08 Mar 2022 17:43)      RADIOLOGY & ADDITIONAL TESTS:      Images reviewed personally    Consultant Notes Reviewed and Care Discussed with relevant Consultants.

## 2022-03-09 NOTE — PROGRESS NOTE ADULT - SUBJECTIVE AND OBJECTIVE BOX
RICKY JOINT  MRN#: [id number]  Subjective: pulmonary progress note  : acute hypercapnic respiratory failure . Septic shock , service rendered on 2002   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 s/p HM2 LVAD in 2017 at  (due to severe PAD) with TV ring, multiple GI bleeds, thus maintained off AC, CKD 3prior COVID-19 infection in 2020, recurrent syncope post LVAD s/p ILR, s/p prolonged complex hospitalization from -2021 initially admitted with abdominal pain complicated by GIB, respiratory failure s/p trach, multiple infections, s/p cholecystotomy tube and SMA stent 10/2021, ultimately discharged to [facility] rehab and then returned to [facility] for trach decannulation , readmitted in 2022 with recurrent COVID-19 infection, initially in distributive shock. Blood cultures were also positive for serratia marcescens which he has had in the past. seen by heart failure team patient is DNR/DNI  continue on 3 liter nasal canula flat Affect ,           (2022 22:20)    PAST MEDICAL & SURGICAL HISTORY:  CHF (congestive heart failure)    CAD (coronary artery disease)  Depression    Pleural effusion    History of 2019 novel coronavirus disease (COVID-19)  2020    Hemorrhoids    Bleeding hemorrhoids    Peripheral arterial disease    Claudication    BPH with urinary obstruction    ACC/AHA stage D systolic heart failure    Anticoagulation goal of INR 2.0 to 2.5    Falls    Clavicle fracture    CKD (chronic kidney disease), stage III    Iron deficiency anemia    H/O epistaxis    Vertigo    GI bleed    S/P TVR (tricuspid valve repair)    S/P ventricular assist device    S/P endoscopy    H/O tracheostomy        FAMILY HISTORY:    Social History:    Marital Status:  X    (   ) Single    (   )    (  )   Occupation: Retired   Lives with: (  ) alone  (  ) children  X spouse   (  ) parents  (  ) other    Substance Use (street drugs): X never used  (  ) other:  Tobacco Usage: X never smoked   (   ) former smoker   (   ) current smoker  (     ) pack year  (    ) last cigarette date  Alcohol Usage: Social         OBJECTIVE:  ICU Vital Signs Last 24 Hrs  T(C): 37.2 (01 Mar 2022 16:00), Max: 38.4 (01 Mar 2022 09:00)  T(F): 99 (01 Mar 2022 16:00), Max: 101.1 (01 Mar 2022 09:00)  HR: 109 (01 Mar 2022 18:00) (67 - 124)  BP: --  BP(mean): 86 (2022 20:00) (86 - 86)  ABP: 89/74 (01 Mar 2022 18:00) (75/63 - 160/85)  ABP(mean): 81 (01 Mar 2022 18:00) (68 - 139)  RR: 38 (01 Mar 2022 18:00) (5 - 40)  SpO2: 99% (01 Mar 2022 18:00) (94% - 100%)       @ 07: @ 07:00  --------------------------------------------------------  IN: 1060.7 mL / OUT: 1600 mL / NET: -539.3 mL     @ 07: @ 18:22  --------------------------------------------------------  IN: 677.8 mL / OUT: 455 mL / NET: 222.8 mL    PHYSICAL EXAM :Daily Height in cm: 177.8 (2022 21:20)  Elderly frail male on 3 liter nasal cannula    Daily Weight in k.7 (2022 21:20)  HEENT:     + NCAT  + EOMI  - Conjuctival edema   - Icterus   - Thrush   - ETT  - NGT/OGT  Neck:         + FROM RT IJ  JVD     - Nodes     - Masses    + Mid-line trachea   - Tracheostomy  Chest:           mild kyphosis   Lungs:          + CTA  + Rhonchi  + Rales    - Wheezing  + Decreased BS   - Dullness R L  Cardiac:       + S1 + S2    + RRR   - Irregular   - S3  - S4    - Murmurs   - Rub   - Hamman’s sign   Abdomen:    + BS     + Soft    + Non-tender  - Distended  - Organomegaly  - PEG Cholecystostomy tube in place   Extremities:   - Cyanosis U/L   - Clubbing  U/L  - LE/UE Edema   + Capillary refill    + Pulses   Neuro:        + Awake   +  Alert   - Confused   - Lethargic   - Sedated   - Generalized Weakness  Skin:        - Rashes    - Erythema   + Normal incisions   + IV sites intact  -      HOSPITAL MEDICATIONS: All mediciations reviewed and analyzed  MEDICATIONS  (STANDING):  albumin human  5% IVPB 250 milliLiter(s) IV Intermittent once  cefepime   IVPB 2000 milliGRAM(s) IV Intermittent every 12 hours  chlorhexidine 2% Cloths 1 Application(s) Topical <User Schedule>  cholecalciferol 1000 Unit(s) Oral daily  dextrose 40% Gel 15 Gram(s) Oral once  dextrose 50% Injectable 25 Gram(s) IV Push once  gabapentin Solution 100 milliGRAM(s) Oral three times a day  glucagon  Injectable 1 milliGRAM(s) IntraMuscular once  insulin lispro (ADMELOG) corrective regimen sliding scale   SubCutaneous every 6 hours  magnesium sulfate  IVPB 1 Gram(s) IV Intermittent once  methimazole 10 milliGRAM(s) Oral daily  metoclopramide 10 milliGRAM(s) Oral four times a day  mexiletine 150 milliGRAM(s) Oral every 8 hours  mirtazapine 7.5 milliGRAM(s) Oral at bedtime  multivitamin 1 Tablet(s) Oral daily  pantoprazole  Injectable 40 milliGRAM(s) IV Push daily  potassium phosphate IVPB 15 milliMole(s) IV Intermittent once  senna 2 Tablet(s) Oral at bedtime  sertraline 100 milliGRAM(s) Oral daily  vancomycin  IVPB 750 milliGRAM(s) IV Intermittent every 24 hours  vasopressin Infusion 0.02 Unit(s)/Min (1.2 mL/Hr) IV Continuous <Continuous>    MEDICATIONS  (PRN):    LABS: All Lab data reviewed and analyzed                         9.3    15.80 )-----------( 218      ( 08 Mar 2022 04:59 )             29.9   03-08    135  |  102  |  15  ----------------------------<  145<H>  4.6   |  25  |  0.99    Ca    8.9      08 Mar 2022 04:59  Phos  3.1     03-08  Mg     1.9     03-08    TPro  7.8  /  Alb  2.9<L>  /  TBili  0.3  /  DBili  x   /  AST  40  /  ALT  24  /  AlkPhos  156<H>        Ca    8.4      07 Mar 2022 05:00  Phos  2.4     03-07  Mg     1.8     -    TPro  7.4  /  Alb  2.7<L>  /  TBili  0.2  /  DBili  x   /  AST  24  /  ALT  16  /  AlkPhos  132<H>      Ca    8.4      05 Mar 2022 04:34  Phos  2.3     03-05  Mg     1.8     -    TPro  7.1  /  Alb  2.9<L>  /  TBili  0.3  /  DBili  x   /  AST  19  /  ALT  15  /  AlkPhos  128<H>  -           PTT - ( 01 Mar 2022 00:08 )  PTT:25.0 sec LIVER FUNCTIONS - ( 01 Mar 2022 17:38 )  Alb: 2.9 g/dL / Pro: 7.3 g/dL / ALK PHOS: 116 U/L / ALT: 30 U/L / AST: 29 U/L / GGT: x           RADIOLOGY: - Reviewed and analyzed

## 2022-03-09 NOTE — PROGRESS NOTE ADULT - ASSESSMENT
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 s/p HM2 LVAD in 9/2017 at  (due to severe PAD) with TV ring, multiple GI bleeds, thus maintained off AC, prior COVID-19 infection in 4/2020, recurrent syncope post LVAD s/p ILR, prolonged complex hospitalization from 6/14-11/16/2021 with GIB, respiratory failure s/p trach decanulated 12/2021, multiple infections, s/p cholecystotomy tube and SMA stent 10/2021, recurrent COVID 19 reinfection s/p MAB/remdesivir/steroids and distributive shock with serratia bacteremia, recurrent VT on mexiletine transferred from [facility] ED with metabolic encephalopathy, hypercarbic respiratory failure weaned off bipap, septic shock requiring pressors (now on midodrine) with serratia bacteremia on cefepime, intermittent h/h drop s/p total 3units prbc (last 3/6), episodes of recurrent VT s/p lidocaine gtt now back on mexiletine.

## 2022-03-09 NOTE — PROGRESS NOTE ADULT - SUBJECTIVE AND OBJECTIVE BOX
HPI:  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 s/p HM2 LVAD in 9/2017 at  (due to severe PAD) with TV ring, multiple GI bleeds, thus maintained off AC, CKD 3prior COVID-19 infection in 4/2020, recurrent syncope post LVAD s/p ILR, s/p prolonged complex hospitalization from 6/14-11/16/2021 initially admitted with abdominal pain complicated by GIB, respiratory failure s/p trach, multiple infections, s/p cholecystotomy tube and SMA stent 10/2021, ultimately discharged to [facility] rehab and then returned to [facility] for trach decannulation 12/2, readmitted in 2/2022 with recurrent COVID-19 infection, initially in distributive shock. Blood cultures were also positive for serratia marcescens which he has had in the past.     Pt was transferred to [facility] CICU from [facility]. He was sent to their ED from a rehab center due to AMS and tachycardia. At [facility], they noted a WBC greater than 20, tachycardia and hypercarbia on ABG. Pt was placed on BiPAP and transferred to [facility] CICU for concern for sepsis vs septic shock.      (27 Feb 2022 22:20)    Collateral info was gathered from the patient's sister (HCP) that indicated, in the BROOKS, the patient also had a fall. However, CT Head dated 2/28 is negative for acute events.     3/1 The patient was lethargic, non verbal, and not able to f/u commands. He was having mild respiratory distress.   3/3 Though more alert, he is still lethargic, not able to f/u commands, and non verbal. He is still on pressors and with a mild degree of respiratory distress.   3/9 The patient was more alert but still lethargic and not really able to express and understand about his medical issues or the risks and benefits of his Rxs. He denied pain or dyspnea. He appeared to be depressed. He is extremely weak and debilitated.      PERTINENT PM/SXH:   No pertinent past medical history    CHF (congestive heart failure)    CAD (coronary artery disease)    Depression    Pleural effusion    History of 2019 novel coronavirus disease (COVID-19)    Hemorrhoids    Bleeding hemorrhoids    Peripheral arterial disease    Claudication    BPH with urinary obstruction    ACC/AHA stage D systolic heart failure    Anticoagulation goal of INR 2.0 to 2.5    Falls    Clavicle fracture    CKD (chronic kidney disease), stage III    Iron deficiency anemia    H/O epistaxis    Vertigo    GI bleed      No significant past surgical history    S/P TVR (tricuspid valve repair)    S/P ventricular assist device    S/P endoscopy    H/O tracheostomy      FAMILY HISTORY:    ITEMS NOT CHECKED ARE NOT PRESENT    SOCIAL HISTORY:   Significant other/partner[ ]  Children[x ]  Gnosticism/Spirituality:  Substance hx:  [ ]   Tobacco hx:  [ ]   Alcohol hx: [ ]   Home Opioid hx:  [ ] I-Stop Reference No:  Living Situation: [ ]Home  [ ]Long term care  [ ]Rehab [ ]Other    ADVANCE DIRECTIVES:    DNR  MOLST  [ ]  Living Will  [ ]   DECISION MAKER(s):  [x ] Health Care Proxy(s)  [ ] Surrogate(s)  [ ] Guardian           Name(s): Phone Number(s): Cristina Rudolph [phone]    BASELINE (I)ADL(s) (prior to admission):  [place]: [ ]Total  [ ] Moderate [ x]Dependent    Allergies    Amiodarone Hydrochloride (Other (Severe))    Intolerances    Medications reviewed.       PRESENT SYMPTOMS: [x ]Unable to obtain due to poor mentation   Source if other than patient:  [ ]Family   [ ]Team     Pain: [ ]yes [x ]no  QOL impact -   Location -                    Aggravating factors -  Quality -  Radiation -  Timing-  Severity (0-10 scale):  Minimal acceptable level (0-10 scale):     CPOT:    https://www.[facility].org/getattachment/[email or web address]/Critical-Care-Pain-Observation-Tool-(CPOT)      PAIN AD Score: 0    http://geriatrictoolkit.[place].[facility]/cog/painad.pdf (press ctrl +  left click to view)  RDOS scale (https://pubmed.ncbi.nlm.nih.gov/65632323/, https://www.I-MD.RaySat/doi/10.1089/jpm.2009.0229?url_ver=Z39.&rfr_id=mala:rid:crossref.org&rfr_dat=cr_pub%20%200pubmed)    A score of 0 to 2 signifies little or no respiratory distress, 3 signifies mild distress, scores 4 to 6 indicate moderate distress, and scores greater than 7 signify severe distress  RDOS score: 0    Dyspnea:                           [ ]Mild [ ]Moderate [ ]Severe  Anxiety:                             [ ]Mild [ ]Moderate [ ]Severe  Fatigue:                             [ ]Mild [ ]Moderate [ ]Severe  Nausea:                             [ ]Mild [ ]Moderate [ ]Severe  Loss of appetite:              [ ]Mild [ ]Moderate [ ]Severe  Constipation:                    [ ]Mild [ ]Moderate [ ]Severe    Other Symptoms:  [ ]All other review of systems negative     Palliative Performance Status Version 2: 20        %    http://npcrc.org/files/news/palliative_performance_scale_ppsv2.pdf  PHYSICAL EXAM:    GENERAL:  [ ]Alert  [ ]Oriented x   [x ]Lethargic  [ ]Cachexia  [ ]Unarousable  [ ]Verbal  [x ]Non-Verbal  Behavioral:   [ ] Anxiety  [ ] Delirium [ ] Agitation [x ] Other: Encephalopathic   HEENT:  [ ]Normal   [ x]Dry mouth   [ ]ET Tube/Trach  [ ]Oral lesions  PULMONARY:   [ ]Clear [x ]Tachypnea  [ ]Audible excessive secretions   [ ]Rhonchi        [ ]Right [ ]Left [ ]Bilateral  [ ]Crackles        [ ]Right [ ]Left [ ]Bilateral  [ ]Wheezing     [ ]Right [ ]Left [ ]Bilateral  [x ]Diminished breath sounds [ ]right [ ]left [x ]bilateral  CARDIOVASCULAR:    [x ]Regular [ ]Irregular [ ]Tachy  [ ]Jose [ ]Murmur [ ]Other  [x] LVAD hum  GASTROINTESTINAL:  [ ]Soft  [ ]Distended   [ ]+BS  [ ]Non tender [ ]Tender  [ ]PEG [ ]OGT/ NGT  Last BM: 3/3  GENITOURINARY:  [ ]Normal [x ] Incontinent   [ ]Oliguria/Anuria   [ ]Landrum  MUSCULOSKELETAL:   [ ]Normal   [x ]Weakness  [ ]Bed/Wheelchair bound [ ]Edema  NEUROLOGIC:   [ ]No focal deficits  [ x]Cognitive impairment  [ ]Dysphagia [ ]Dysarthria [ ]Paresis [ ]Other   SKIN:   [ ]Normal    [ ]Rash  [ ]Pressure ulcer(s)       Present on admission [ ]y [ ]n  [x] BL Heel and R Buttock DTI   CRITICAL CARE:  [ ] Shock Present  [ ]Septic [ ]Cardiogenic [ ]Neurologic [ ]Hypovolemic  [ ]  Vasopressors [ ]  Inotropes   [ ]Respiratory failure present [ ]Mechanical ventilation [ ]Non-invasive ventilatory support [ ]High flow    [ ]Acute  [ ]Chronic [ ]Hypoxic  [ ]Hypercarbic [ ]Other  [ ]Other organ failure     LABS:                                           7.8    16.60 )-----------( 86       ( 03 Mar 2022 02:53 )             24.8   03-03    136  |  105  |  15  ----------------------------<  198<H>  3.8   |  21<L>  |  0.99    Ca    8.8      03 Mar 2022 02:53  Phos  1.9     03-03  Mg     1.9     03-03    TPro  7.2  /  Alb  2.8<L>  /  TBili  0.3  /  DBili  x   /  AST  17  /  ALT  17  /  AlkPhos  128<H>  03-03        RADIOLOGY & ADDITIONAL STUDIES:  Reviewed   PROTEIN CALORIE MALNUTRITION PRESENT: [ ]mild [ ]moderate [ ]severe [ ]underweight [ ]morbid obesity  https://www.andeal.org/vault/2440/web/files/ONC/Table_Clinical%20Characteristics%20to%20Document%20Malnutrition-White%20JV%20et%20al%202012.pdf    Height (cm): 177.8 (02-27-22 @ 21:20), 182.9 (12-13-21 @ 04:30), 165.1 (12-02-21 @ 13:55)  Weight (kg): 48.7 (02-27-22 @ 21:20), 52.6 (01-12-22 @ 12:50), 53.6 (12-02-21 @ 13:55)  BMI (kg/m2): 15.4 (02-27-22 @ 21:20), 15.7 (01-12-22 @ 12:50), 16 (12-13-21 @ 04:30)    [ ]PPSV2 < or = to 30% [ ]significant weight loss  [ ]poor nutritional intake  [ ]anasarca      [ ]Artificial Nutrition      REFERRALS:   [ ]Chaplaincy  [ ]Hospice  [ ]Child Life  [ ]Social Work  [ ]Case management [ ]Holistic Therapy     Goals of Care Document:  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 s/p HM2 LVAD in 9/2017 at  (due to severe PAD) with TV ring, multiple GI bleeds, thus maintained off AC, CKD 3prior COVID-19 infection in 4/2020, recurrent syncope post LVAD s/p ILR, s/p prolonged complex hospitalization from 6/14-11/16/2021 initially admitted with abdominal pain complicated by GIB, respiratory failure s/p trach, multiple infections, s/p cholecystotomy tube and SMA stent 10/2021, ultimately discharged to [facility] rehab and then returned to [facility] for trach decannulation 12/2, readmitted in 2/2022 with recurrent COVID-19 infection, initially in distributive shock. Blood cultures were also positive for serratia marcescens which he has had in the past.     Pt was transferred to [facility] CICU from [facility]. He was sent to their ED from a rehab center due to AMS and tachycardia. At [facility], they noted a WBC greater than 20, tachycardia and hypercarbia on ABG. Pt was placed on BiPAP and transferred to [facility] CICU for concern for sepsis vs septic shock.      (27 Feb 2022 22:20)    Collateral info was gathered from the patient's sister (HCP) that indicated, in the BROOKS, the patient also had a fall. However, CT Head dated 2/28 is negative for acute events.     3/1 The patient was lethargic, non verbal, and not able to f/u commands. He was having mild respiratory distress.   3/3 Though more alert, he is still lethargic, not able to f/u commands, and non verbal. He is still on pressors and with a mild degree of respiratory distress.   3/9 The patient was more alert but still lethargic and not really able to express and understand about his medical issues or the risks and benefits of his Rxs. He denied pain or dyspnea. He appeared to be depressed. He is extremely weak and debilitated.      PERTINENT PM/SXH:   No pertinent past medical history    CHF (congestive heart failure)    CAD (coronary artery disease)    Depression    Pleural effusion    History of 2019 novel coronavirus disease (COVID-19)    Hemorrhoids    Bleeding hemorrhoids    Peripheral arterial disease    Claudication    BPH with urinary obstruction    ACC/AHA stage D systolic heart failure    Anticoagulation goal of INR 2.0 to 2.5    Falls    Clavicle fracture    CKD (chronic kidney disease), stage III    Iron deficiency anemia    H/O epistaxis    Vertigo    GI bleed      No significant past surgical history    S/P TVR (tricuspid valve repair)    S/P ventricular assist device    S/P endoscopy    H/O tracheostomy      FAMILY HISTORY:    ITEMS NOT CHECKED ARE NOT PRESENT    SOCIAL HISTORY:   Significant other/partner[ ]  Children[x ]  Adventist/Spirituality:  Substance hx:  [ ]   Tobacco hx:  [ ]   Alcohol hx: [ ]   Home Opioid hx:  [ ] I-Stop Reference No:  Living Situation: [ ]Home  [ ]Long term care  [ ]Rehab [ ]Other    ADVANCE DIRECTIVES:    DNR  MOLST  [ ]  Living Will  [ ]   DECISION MAKER(s):  [x ] Health Care Proxy(s)  [ ] Surrogate(s)  [ ] Guardian           Name(s): Phone Number(s): Cristina Rudolph [phone]    BASELINE (I)ADL(s) (prior to admission):  [place]: [ ]Total  [ ] Moderate [ x]Dependent    Allergies    Amiodarone Hydrochloride (Other (Severe))    Intolerances    Medications reviewed.       PRESENT SYMPTOMS: [x ]Unable to obtain due to poor mentation   Source if other than patient:  [ ]Family   [ ]Team     Pain: [ ]yes [x ]no  QOL impact -   Location -                    Aggravating factors -  Quality -  Radiation -  Timing-  Severity (0-10 scale):  Minimal acceptable level (0-10 scale):     CPOT:    https://www.[facility].org/getattachment/[email or web address]/Critical-Care-Pain-Observation-Tool-(CPOT)      PAIN AD Score: 0    http://geriatrictoolkit.[place].[facility]/cog/painad.pdf (press ctrl +  left click to view)  RDOS scale (https://pubmed.ncbi.nlm.nih.gov/44336489/, https://www.SiTime.[employer]/doi/10.1089/jpm.2009.0229?url_ver=Z39.&rfr_id=mala:rid:crossref.org&rfr_dat=cr_pub%20%200pubmed)    A score of 0 to 2 signifies little or no respiratory distress, 3 signifies mild distress, scores 4 to 6 indicate moderate distress, and scores greater than 7 signify severe distress  RDOS score: 0    Dyspnea:                           [ ]Mild [ ]Moderate [ ]Severe  Anxiety:                             [ ]Mild [ ]Moderate [ ]Severe  Fatigue:                             [ ]Mild [ ]Moderate [ ]Severe  Nausea:                             [ ]Mild [ ]Moderate [ ]Severe  Loss of appetite:              [ ]Mild [ ]Moderate [ ]Severe  Constipation:                    [ ]Mild [ ]Moderate [ ]Severe    Other Symptoms:  [ ]All other review of systems negative     Palliative Performance Status Version 2: 20        %    http://[facility].org/files/news/palliative_performance_scale_ppsv2.pdf  PHYSICAL EXAM:  Vital signs:   T: 98  P: 97  BP: 94/68  RR: 17  O2 sat: 99  GENERAL:  [ ]Alert  [ ]Oriented x   [x ]Lethargic  [ ]Cachexia  [ ]Unarousable  [ ]Verbal  [x ]Non-Verbal  Behavioral:   [ ] Anxiety  [ ] Delirium [ ] Agitation [x ] Other: Encephalopathic   HEENT:  [ ]Normal   [ x]Dry mouth   [ ]ET Tube/Trach  [ ]Oral lesions  PULMONARY:   [ ]Clear [ ]Tachypnea  [ ]Audible excessive secretions   [ ]Rhonchi        [ ]Right [ ]Left [ ]Bilateral  [ ]Crackles        [ ]Right [ ]Left [ ]Bilateral  [ ]Wheezing     [ ]Right [ ]Left [ ]Bilateral  [x ]Diminished breath sounds [ ]right [ ]left [x ]bilateral  CARDIOVASCULAR:    [x ]Regular [ ]Irregular [ ]Tachy  [ ]Jose [ ]Murmur [ ]Other  [x] LVAD hum  GASTROINTESTINAL:  [ ]Soft  [ ]Distended   [ ]+BS  [ ]Non tender [ ]Tender  [ ]PEG [ ]OGT/ NGT  Last BM: 3/8  GENITOURINARY:  [ ]Normal [x ] Incontinent   [ ]Oliguria/Anuria   [ ]Landrum  MUSCULOSKELETAL:   [ ]Normal   [x ]Weakness  [ ]Bed/Wheelchair bound [ ]Edema  NEUROLOGIC:   [ ]No focal deficits  [ x]Cognitive impairment  [ ]Dysphagia [ ]Dysarthria [ ]Paresis [ ]Other   SKIN:   [ ]Normal    [ ]Rash  [ ]Pressure ulcer(s)       Present on admission [ ]y [ ]n  [x] BL Heel and R Buttock DTI   CRITICAL CARE:  [ ] Shock Present  [ ]Septic [ ]Cardiogenic [ ]Neurologic [ ]Hypovolemic  [ ]  Vasopressors [ ]  Inotropes   [ ]Respiratory failure present [ ]Mechanical ventilation [ ]Non-invasive ventilatory support [ ]High flow    [ ]Acute  [ ]Chronic [ ]Hypoxic  [ ]Hypercarbic [ ]Other  [ ]Other organ failure     LABS:                              Reviewed.       RADIOLOGY & ADDITIONAL STUDIES:  Reviewed   PROTEIN CALORIE MALNUTRITION PRESENT: [ ]mild [ ]moderate [ ]severe [ ]underweight [ ]morbid obesity  https://www.andeal.org/vault/2440/web/files/ONC/Table_Clinical%20Characteristics%20to%20Document%20Malnutrition-White%20JV%20et%20al%704204.pdf    Height (cm): 177.8 (02-27-22 @ 21:20), 182.9 (12-13-21 @ 04:30), 165.1 (12-02-21 @ 13:55)  Weight (kg): 48.7 (02-27-22 @ 21:20), 52.6 (01-12-22 @ 12:50), 53.6 (12-02-21 @ 13:55)  BMI (kg/m2): 15.4 (02-27-22 @ 21:20), 15.7 (01-12-22 @ 12:50), 16 (12-13-21 @ 04:30)    [ ]PPSV2 < or = to 30% [ ]significant weight loss  [ ]poor nutritional intake  [ ]anasarca      [ ]Artificial Nutrition      REFERRALS:   [ ]Chaplaincy  [ ]Hospice  [ ]Child Life  [ ]Social Work  [ ]Case management [ ]Holistic Therapy     Goals of Care Document:

## 2022-03-09 NOTE — PROGRESS NOTE ADULT - ASSESSMENT
66 years old male with PMHx of Stage D HF 2/2 NICM s/p HM2 LVAD in 9/2017 at  (due to severe PAD) with TV ring, multiple GI bleeds, no AC, CKD 3 prior COVID-19 infection in 4/2020, chronic cholecystitis s/p percutaneous cholecystostomy tube, recurrent COVID infection, Serratia bacteremia transferred from [facility] for sepsis.    ID is consulted for septic shock  Recently had Serratia bacteremia discharged on suppressive PO Levaquin  Returned with leukocytosis, fever, AMS, hypotension requiring pressors  ?purulent sputum coming out from previous trach site  CXR no PNA  CTH unrevealing  LVAD exit sites shows no signs of infection  Perc dawn tube site shows no signs of infection  BCx Serratia, S cefepime and ertapenem, R quinolones  CT showed abdominal aortic thrombus and a 2.6cm splenic lesion    Antibiotics:  Vancomycin 2/28 - 3/1  Zosyn 2/28  Cefepime 2/28 -     Currently unclear etiology of septic shock likely secondary to recurrent serratia bacteremia  Source of serratia is could be LVAD, infected aortic thrombus or splenic lesion  Regardless will treat this as in intravascular infection so likely 4 - 6 weeks in total    Detailed discussion with CICU attending Dr. Jensen, patient likely would have worsening respiratory status but due to his multiple comorbidities and previous intubation, it is futile to escalate care to intubation as patient will likely unable to be weaned off ventilator.      IMPRESSION:  Septic shock 2/2 serratia bacteremia  Aortic thrombus  Splenic lesion  Leukocytosis  Fever  LVAD      RECOMMENDATIONS:  - Continue IV cefepime 1gm q12hrs  - Follow up blood cultures show evidence of bacteremia clearance  - mental status improved but not yet at baseline, off pressors and oxygen supplementation    Morris Prater MD  [phone]  After 5pm/weekends [phone]

## 2022-03-10 NOTE — PROGRESS NOTE ADULT - PROBLEM SELECTOR PLAN 1
On tube feeds  Aspiration precautions.   See the independent GOC note that I entered today. However, in summary, the HCP is asking for a PEG araceli. On tube feeds  Aspiration precautions.   See the independent GOC note that I entered today. However, in summary, the HCP is asking for a PEG eval.

## 2022-03-10 NOTE — SWALLOW BEDSIDE ASSESSMENT ADULT - SLP PERTINENT HISTORY OF CURRENT PROBLEM
RICKY HAMPTON is a 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 s/p HM2 LVAD in 9/2017 at  (due to severe PAD) with TV ring, multiple GI bleeds, thus maintained off AC, CKD 3prior COVID-19 infection in 4/2020, recurrent syncope post LVAD s/p ILR, s/p prolonged complex hospitalization from 6/14-11/16/2021 initially admitted with abdominal pain complicated by GIB, respiratory failure s/p trach, multiple infections, s/p cholecystostomy tube and SMA stent 10/2021, ultimately discharged to [facility] rehab and then returned to [facility] for trach decannulation 12/2, readmitted in 2/2022 with recurrent COVID-19 infection, initially in distributive shock. Blood cultures were also positive for Serratia marcescens which he has had in the past.

## 2022-03-10 NOTE — PROGRESS NOTE ADULT - PROBLEM SELECTOR PLAN 3
- will resume Toprol XL at 25 mg daily since his BP has been stable off midodrine  - continue to hold aldactone given hyperkalemia; will receive another dose of Lokelma 10G today and will speak to dietary about whether his tube feeds should be adjusted  - maintain I=O

## 2022-03-10 NOTE — CHART NOTE - NSCHARTNOTEFT_GEN_A_CORE
Nutrition Follow Up Note  Patient seen for: verbal consult for EN recommendations with electrolyte abnormalities    Chart reviewed, events noted. Per chart: 66M, PMH of Stage D 2/2 NICM s/p HM2 LVAD in 2017 at  (due to severe PAD) with TV ring, multiple GI bleeds, thus maintained off AC, CKD3, prior COVID-19 infection in 2020, recurrent syncope post LVAD s/p ILR, s/p prolonged complex hospitalization from -2021 initially admitted with abdominal pain complicated by GIB, respiratory failure s/p trach, multiple infections, s/p cholecystotomy tube and SMA stent 10/2021, ultimately discharged to [facility] rehab and then returned to [facility] for trach decannulation , readmitted in 2022 with recurrent COVID-19 infection, initially in distributive shock. Presented from rehab center due to AMS and tachycardia; was placed on BiPAP and transferred to [facility] CICU for concern for sepsis vs septic shock. Geriatrics and Palliative Medicine (GaP) Team was called for goals of care     Source: EMR, Team    -If unable to interview patient: [] Trach/Vent/BiPAP  [x] Disoriented/confused/inappropriate to interview    Diet Order:   Diet, NPO with Tube Feed:   Tube Feeding Modality: Nasogastric  Jevity 1.2 Tank (JEVITY1.2RTH)  Total Volume for 24 Hours (mL): 1320  Continuous  Starting Tube Feed Rate {mL per Hour}: 10  Increase Tube Feed Rate by (mL): 10     Every 4 hours  Until Goal Tube Feed Rate (mL per Hour): 55  Tube Feed Duration (in Hours): 24  Tube Feed Start Time: 13:45  No Carb Prosource TF     Qty per Day:  2 (22)    EN Order Provides: EN Order Provides: 1320ml formula, 1664kcals, 95g protein, 1065ml free H2O  - Meets 34 kcal/kg & 1.95g protein/kg based on dosing wt 48.7 kG    EN provision: >95% EN volume goal provided in past 5 days    Is current diet order appropriate/adequate? Yes, however recommend d/c No Carb Prosource     Nutrition-Related Events:  - Hyponatremic - Na 128   - Hyperkalemia - lokelma ordered  as of 3/9  - Insulin Sliding Scale ordered to optimize BG  - Continues on Remeron  - Continues on Vitamin D3 and Multivitamin daily    GI:  Last BM 3/9.   Bowel Regimen? [x] Yes (senna)  [] No  - Sybil Drain: 40mL (3/10), 25mL (3/9), 55mL (3/8), 10ml (3/7)  - Currently ordered for abx course at this time    Weight in k.3 (), 55.3 (), 58 (), 57.1 (), 56.2 (), 54.4 ()  - Wt fluctuations noted and likely secondary to fluid shifts as pt noted with varying edema during admission    Dosing Weight: 107.3 Ibs/48.7 kg ()  BMI (kg/m2): 15.4 ()  Ideal Body Weight: 166 Ibs/75.3 kg    MEDICATIONS  (STANDING):  cefepime   IVPB  cholecalciferol  insulin lispro (ADMELOG) corrective regimen sliding scale  methimazole  metoprolol succinate ER  mexiletine  multivitamin  pantoprazole  Injectable  senna    Pertinent Labs: 03-10 @ 06:26: Na 128<L>, BUN 26<H>, Cr 0.98, <H>, K+ 5.5<H>, Phos --, Mg --, Alk Phos --, ALT/SGPT --, AST/SGOT --, HbA1c --    A1C with Estimated Average Glucose Result: 5.3 % (22 @ 22:11)    Finger Sticks:  POCT Blood Glucose.: 142 mg/dL (03-10 @ 11:48)  POCT Blood Glucose.: 143 mg/dL (03-10 @ 05:50)  POCT Blood Glucose.: 128 mg/dL (03-10 @ 00:09)  POCT Blood Glucose.: 46 mg/dL (03-10 @ 00:08)  POCT Blood Glucose.: 163 mg/dL ( @ 17:56)    Skin per nursing flowsheets: LVAD driveline site, + trach stoma, Suspected DTI to sacrum and R+L heel, Stage 2 Pressure Injury to R buttocks  Edema: none    Based on dosing wt 48.7 kG  Estimated Energy Needs: (30-35 kcals/kG) 7078-0524 kcals   Estimated Protein Needs: (1.4-1.9 gm/kG) 68.18-92.53 gm  Fluid needs deferred to provider.     Previous Nutrition Diagnosis:  Malnutrition severe, chronic + Underweight   Nutrition Diagnosis is: [x] ongoing  [] resolved [] not applicable     New Nutrition Diagnosis: [x] Not applicable    Nutrition Care Plan:  [x] In Progress  [] Achieved  [] Not applicable      Recommendations:      1. In setting of electrolyte abnormalities, consider changing EN regimen to Jevity 1.5, initiate @ 20ml/hr and advance as tolerated to goal rate of 45ml/hr x 24hrs + 1 No Carb Prosource TF. At goal to provide 1080ml formula, 1660kcals, 80g protein (meets 34kcals/kg & 1.6g protein/kg based on dosing wt 48.7kg)  --> Monitor GOC  2. Recommend addition of Reji BID to further facilitate wound healing.  3. Continue micronutrient supplementation as ordered; Consider addition of thiamin and Vitamin C supplementation daily  4. RD to remain available to adjust EN formulary, volume/rate PRN.     Monitoring and Evaluation:   Continue to monitor nutritional intake, tolerance to diet prescription, weights, labs, skin integrity  RD remains available upon request and will follow up per protocol Nutrition Follow Up Note  Patient seen for: verbal consult for EN recommendations with electrolyte abnormalities    Chart reviewed, events noted. Per chart: 66M, PMH of Stage D 2/2 NICM s/p HM2 LVAD in 2017 at  (due to severe PAD) with TV ring, multiple GI bleeds, thus maintained off AC, CKD3, prior COVID-19 infection in 2020, recurrent syncope post LVAD s/p ILR, s/p prolonged complex hospitalization from -2021 initially admitted with abdominal pain complicated by GIB, respiratory failure s/p trach, multiple infections, s/p cholecystotomy tube and SMA stent 10/2021, ultimately discharged to [facility] rehab and then returned to [facility] for trach decannulation , readmitted in 2022 with recurrent COVID-19 infection, initially in distributive shock. Presented from rehab center due to AMS and tachycardia; was placed on BiPAP and transferred to [facility] CICU for concern for sepsis vs septic shock. Geriatrics and Palliative Medicine (GaP) Team was called for goals of care     Source: EMR, Team    -If unable to interview patient: [] Trach/Vent/BiPAP  [x] Disoriented/confused/inappropriate to interview    Diet Order:   Diet, NPO with Tube Feed:   Tube Feeding Modality: Nasogastric  Jevity 1.2 Tank (JEVITY1.2RTH)  Total Volume for 24 Hours (mL): 1320  Continuous  Starting Tube Feed Rate {mL per Hour}: 10  Increase Tube Feed Rate by (mL): 10     Every 4 hours  Until Goal Tube Feed Rate (mL per Hour): 55  Tube Feed Duration (in Hours): 24  Tube Feed Start Time: 13:45  No Carb Prosource TF     Qty per Day:  2 (22)    EN Order Provides: EN Order Provides: 1320ml formula, 1664kcals, 95g protein, 1065ml free H2O  - Meets 34 kcal/kg & 1.95g protein/kg based on dosing wt 48.7 kG    EN provision: >95% EN volume goal provided in past 5 days    Is current diet order appropriate/adequate? Yes, however recommend d/c No Carb Prosource     Nutrition-Related Events:  - Hyponatremic - Na 128   - Hyperkalemia - lokelma ordered  as of 3/9  - Insulin Sliding Scale ordered to optimize BG  - Continues on Remeron  - Continues on Vitamin D3 and Multivitamin daily    GI:  Last BM 3/9.   Bowel Regimen? [x] Yes (senna)  [] No  - Sybil Drain: 40mL (3/10), 25mL (3/9), 55mL (3/8), 10ml (3/7)  - Currently ordered for abx course at this time    Weight in k.3 (), 55.3 (), 58 (), 57.1 (), 56.2 (), 54.4 ()  - Wt fluctuations noted and likely secondary to fluid shifts as pt noted with varying edema during admission    Dosing Weight: 107.3 Ibs/48.7 kg ()  BMI (kg/m2): 15.4 ()  Ideal Body Weight: 166 Ibs/75.3 kg    MEDICATIONS  (STANDING):  cefepime   IVPB  cholecalciferol  insulin lispro (ADMELOG) corrective regimen sliding scale  methimazole  metoprolol succinate ER  mexiletine  multivitamin  pantoprazole  Injectable  senna    Pertinent Labs: 03-10 @ 06:26: Na 128<L>, BUN 26<H>, Cr 0.98, <H>, K+ 5.5<H>, Phos --, Mg --, Alk Phos --, ALT/SGPT --, AST/SGOT --, HbA1c --    A1C with Estimated Average Glucose Result: 5.3 % (22 @ 22:11)    Finger Sticks:  POCT Blood Glucose.: 142 mg/dL (03-10 @ 11:48)  POCT Blood Glucose.: 143 mg/dL (03-10 @ 05:50)  POCT Blood Glucose.: 128 mg/dL (03-10 @ 00:09)  POCT Blood Glucose.: 46 mg/dL (03-10 @ 00:08)  POCT Blood Glucose.: 163 mg/dL ( @ 17:56)    Skin per nursing flowsheets: LVAD driveline site, + trach stoma, Suspected DTI to sacrum and R+L heel, Stage 2 Pressure Injury to R buttocks  Edema: none    Based on dosing wt 48.7 kG  Estimated Energy Needs: (30-35 kcals/kG) 3596-2355 kcals   Estimated Protein Needs: (1.4-1.9 gm/kG) 68.18-92.53 gm  Fluid needs deferred to provider.     Previous Nutrition Diagnosis:  Malnutrition severe, chronic + Underweight   Nutrition Diagnosis is: [x] ongoing  [] resolved [] not applicable     New Nutrition Diagnosis: [x] Not applicable    Nutrition Care Plan:  [x] In Progress  [] Achieved  [] Not applicable      Recommendations:      1. In setting of electrolyte abnormalities, consider changing EN regimen to Jevity 1.5, initiate @ 20ml/hr and advance as tolerated to goal rate of 45ml/hr x 24hrs + 1 No Carb Prosource TF. At goal to provide 1080ml formula, 1660kcals, 80g protein (meets 34kcals/kg & 1.6g protein/kg based on dosing wt 48.7kg)  --> Monitor GOC  2. Recommend addition of Reji BID to further facilitate wound healing.  3. Continue micronutrient supplementation as ordered; Consider addition of thiamin and Vitamin C supplementation daily  4. RD to remain available to adjust EN formulary, volume/rate PRN.     Monitoring and Evaluation:   Continue to monitor nutritional intake, tolerance to diet prescription, weights, labs, skin integrity  RD remains available upon request and will follow up per protocol    Wendy Travis, MS, RD, CDN, CNSC  pager #[phone]

## 2022-03-10 NOTE — PROGRESS NOTE ADULT - SUBJECTIVE AND OBJECTIVE BOX
Subjective:  - frequent runs of NSVT this morning (3-8 bts)  - opens eyes and follows some commands, but MS still not back to baseline    Medications:  cefepime   IVPB 1000 milliGRAM(s) IV Intermittent every 12 hours  chlorhexidine 2% Cloths 1 Application(s) Topical <User Schedule>  cholecalciferol 2000 Unit(s) Oral daily  insulin lispro (ADMELOG) corrective regimen sliding scale   SubCutaneous every 6 hours  methimazole 10 milliGRAM(s) Oral daily  metoprolol succinate ER 25 milliGRAM(s) Oral daily  mexiletine 150 milliGRAM(s) Oral every 8 hours  mirtazapine 7.5 milliGRAM(s) Oral at bedtime  multivitamin 1 Tablet(s) Oral daily  pantoprazole  Injectable 40 milliGRAM(s) IV Push daily  senna 2 Tablet(s) Oral at bedtime  sertraline 100 milliGRAM(s) Oral daily  sodium zirconium cyclosilicate 10 Gram(s) Oral once  traMADol 25 milliGRAM(s) Oral once      ICU Vital Signs Last 24 Hrs  T(C): 36.7, Max: 37 (03-09 @ 23:06)  HR: 106 (95 - 106)  BP: 132/73 (93/67 - 132/73)  BP(mean): 87 (75 - 87)  ABP: --  ABP(mean): --  RR: 18 (17 - 18)  SpO2: 99% (97% - 99%)    Weight in k.3 (22)  Weight in k.3 (22)      I&O's Summary Last 24 Hrs    IN: 1425 mL / OUT: 1680 mL / NET: -255 mL      Tele: SR     Physical Exam:    General: No distress. Comfortable. Frail.  HEENT: EOM intact. Open prior tracheostomy site covered with gauze  Neck: Neck supple. JVP not elevated. No masses  Chest: Clear to auscultation bilaterally  CV: LVAD hum. No palpable pulses.  Abdomen: Soft, non-distended, non-tender. LVAD driveline site with dressing c/d/i. perc choley drain in place with bilious output.  Skin: warm peripherally.  Neurology: Alert and following some commands  Psych: MARELY, nonverbal    LVAD heart mate 2  Speed 9200  Flow 5.1  PI 6.2  Power 5.3  No PI events, no changes made  DLES dressing C/D/I      Labs:    ( 03-10-22 @ 06:26 )               9.2    12.62 )--------( 204                  29.7     ( 03-10-22 @ 06:26 )     128  |  96  |  26  ---------------------<  149  5.5  |  24  |  0.98    Ca 9.3  Phos x   Mg x     ( 22 @ 04:57 )  TPro  8.3  /  Alb  3.0  /  TBili  0.4  /  DBili  x   /  AST  35  /  ALT  23  /  AlkPhos  166  ( 22 @ 04:59 )  TPro  7.8  /  Alb  2.9  /  TBili  0.3  /  DBili  x   /  AST  40  /  ALT  24  /  AlkPhos  156    ( 22 @ 23:13 )  TropHS 24    / CK x     / CKMB x        Serum Pro-Brain Natriuretic Peptide: 72053 (22 @ 04:57)    Lactate Dehydrogenase, Serum: 274 (22 @ 04:57)  Lactate Dehydrogenase, Serum: 201 (22 @ 05:00)

## 2022-03-10 NOTE — PROGRESS NOTE ADULT - ASSESSMENT
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    S/p prolonged complex hospitalization from 6/14-11/16/2021 initially admitted with abdominal pain complicated by GIB, respiratory failure s/p trach, multiple infections, s/p cholecystotomy tube and SMA stent 10/2021, ultimately discharged to [facility] [place] rehab and then returned to [facility] for trach decannulation 12/2. The patient had a recent admission with COVID 19 reinfection and distributive shock. That admission he had blood culture positive for serratia marcescens (discharged on levaquin).  He was treated with MAB, remdesivir, and steroids. He had recurrent VT and was started mexiletine.     Now coming in from [facility] ED with leukocytosis and AMS was treated for UTI. Initially required bipap but was weaned off here. Labs concerning here for WBC 26, hemoglobin of 7s then 6.7, creatinine of 1.3. His maps were initially in the 50s. Physical exam showing pus from stoma, sat of central access of 81.8, and tachycardia are all concerning for septic shock picture. He is s/p 1L fluids, 1uPRBCs, was placed on vaso. Patient has known episodes of VT and had recurrent NSVT/VT when he first arrived started on lidocaine infusion with improvement. Of note he was previously on mexiletine 150 TID outpatient, metoprolol ER 25 qAM and 50 qPM. Previous history of thyrotoxicosis on amiodarone.    He was found to be bacteremic with recurrent serratia, on IV abx possibly r/t LVAD infection vs splenic abscess noted on CT 1/19. Afebrile since 3/1. Leukocytosis improving. CVP was low despite albumin and IVF with transient improvement. s/p 1 unit PRBC 3/6. Vasopressin weaned off 3/4, started on midodrine which has now been off since 3/8. LVAD without s/s of pump malfunction. This morning he appears alert and is following some commands, but MS still not back to baseline. Also having frequent runs of NSVT. Overall, critically ill with guarded prognosis.

## 2022-03-10 NOTE — PROGRESS NOTE ADULT - PROBLEM SELECTOR PLAN 5
See my GOC note dated 3/3 as well as today's GOC note. However, in summary, GOC are for trying to manage his infection and to continue to do all interventions in order to try to prolong the patient's life and to improve the patient's condition including a PEG if possible; however, his family agreed with DNR/I.

## 2022-03-10 NOTE — PROGRESS NOTE ADULT - ASSESSMENT
66 years old male with PMHx of Stage D HF 2/2 NICM s/p HM2 LVAD in 9/2017 at  (due to severe PAD) with TV ring, multiple GI bleeds, no AC, CKD 3 prior COVID-19 infection in 4/2020, chronic cholecystitis s/p percutaneous cholecystostomy tube, recurrent COVID infection, Serratia bacteremia transferred from [facility] for sepsis.    ID is consulted for septic shock  Recently had Serratia bacteremia discharged on suppressive PO Levaquin  Returned with leukocytosis, fever, AMS, hypotension requiring pressors  ?purulent sputum coming out from previous trach site  CXR no PNA  CTH unrevealing  LVAD exit sites shows no signs of infection  Perc dawn tube site shows no signs of infection  BCx Serratia, S cefepime and ertapenem, R quinolones  CT showed abdominal aortic thrombus and a 2.6cm splenic lesion    Antibiotics:  Vancomycin 2/28 - 3/1  Zosyn 2/28  Cefepime 2/28 -     Currently unclear etiology of septic shock likely secondary to recurrent serratia bacteremia  Source of serratia is could be LVAD, infected aortic thrombus or splenic lesion  Regardless will treat this as in intravascular infection so likely 4 - 6 weeks in total    Detailed discussion with CICU attending Dr. Jensen, patient likely would have worsening respiratory status but due to his multiple comorbidities and previous intubation, it is futile to escalate care to intubation as patient will likely unable to be weaned off ventilator.      IMPRESSION:  Septic shock 2/2 serratia bacteremia  Aortic thrombus  Splenic lesion  Leukocytosis  Fever  LVAD      RECOMMENDATIONS:  - Continue IV cefepime 1gm q12hrs  - Follow up blood cultures show evidence of bacteremia clearance  - mental status improved but not yet at baseline, off pressors and oxygen supplementation    Morris Prater MD  [phone]  After 5pm/weekends [phone]     66 years old male with PMHx of Stage D HF 2/2 NICM s/p HM2 LVAD in 9/2017 at  (due to severe PAD) with TV ring, multiple GI bleeds, no AC, CKD 3 prior COVID-19 infection in 4/2020, chronic cholecystitis s/p percutaneous cholecystostomy tube, recurrent COVID infection, Serratia bacteremia transferred from [facility] for sepsis.    ID is consulted for septic shock  Recently had Serratia bacteremia discharged on suppressive PO Levaquin  Returned with leukocytosis, fever, AMS, hypotension requiring pressors  ?purulent sputum coming out from previous trach site  CXR no PNA  CTH unrevealing  LVAD exit sites shows no signs of infection  Perc dawn tube site shows no signs of infection  BCx Serratia, S cefepime and ertapenem, R quinolones  CT showed abdominal aortic thrombus and a 2.6cm splenic lesion    Antibiotics:  Vancomycin 2/28 - 3/1  Zosyn 2/28  Cefepime 2/28 -     Currently unclear etiology of septic shock likely secondary to recurrent serratia bacteremia  Source of serratia is could be LVAD, infected aortic thrombus or splenic lesion  Regardless will treat this as in intravascular infection so likely 4 - 6 weeks in total    Detailed discussion with CICU attending Dr. Jensen, patient likely would have worsening respiratory status but due to his multiple comorbidities and previous intubation, it is futile to escalate care to intubation as patient will likely unable to be weaned off ventilator.      IMPRESSION:  Septic shock 2/2 serratia bacteremia  Aortic thrombus  Splenic lesion  Leukocytosis  Fever  LVAD      RECOMMENDATIONS:  - Continue IV cefepime 1gm q12hrs  - Follow up blood cultures show evidence of bacteremia clearance  - mental status improved but not yet at baseline, off pressors and oxygen supplementation  -- will plan a prolonged course of Cefepime for 4 weeks    Morris Prater MD  [phone]  After 5pm/weekends [phone]

## 2022-03-10 NOTE — PROGRESS NOTE ADULT - ASSESSMENT
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 s/p HM2 LVAD in 9/2017 at  (due to severe PAD) with TV ring, multiple GI bleeds, thus maintained off AC, prior COVID-19 infection in 4/2020, recurrent syncope post LVAD s/p ILR, prolonged complex hospitalization from 6/14-11/16/2021 with GIB, respiratory failure s/p trach decanulated 12/2021, multiple infections, s/p cholecystotomy tube and SMA stent 10/2021, recurrent COVID 19 reinfection s/p MAB/remdesivir/steroids and distributive shock with serratia bacteremia, recurrent VT on mexiletine transferred from [facility] ED with metabolic encephalopathy, hypercarbic respiratory failure weaned off bipap, septic shock requiring pressors (now on midodrine) with serratia bacteremia on cefepime, intermittent h/h drop s/p total 3units prbc (last 3/6), episodes of recurrent VT s/p lidocaine gtt now back on mexiletine.

## 2022-03-10 NOTE — PROGRESS NOTE ADULT - ATTENDING COMMENTS
65 y/o M with HM3 LVAD, well known to our service. Admitted with sepsis and AMS. Found to have recurrent bacteremia, now on appropriate antibiotics. Remains non-verbal but squeezing hands to command and nodding head.  For now, continue treatment of bacteremia. Appreciate ID involvement.  Has been having runs of NSVT. Will resume Metoprolol (held previously due to hypotension). If needed, may need to increase dose of Mexiletine.  Have asked dietician to evaluate tube feeds given electrolyte disturbances. Appreciate assistance.   Ongoing discussions with Palliative Care and family with respect to goals of care. Currently being fed through NG tube, plan for swallow evaluation today.   Prognosis guarded.

## 2022-03-10 NOTE — PROGRESS NOTE ADULT - ASSESSMENT
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 s/p HM2 LVAD in 9/2017 at  (due to severe PAD) with TV ring, multiple GI bleeds, thus maintained off AC, CKD 3prior COVID-19 infection in 4/2020, recurrent syncope post LVAD s/p ILR, s/p prolonged complex hospitalization from 6/14-11/16/2021 initially admitted with abdominal pain complicated by GIB, respiratory failure s/p trach, multiple infections, s/p cholecystotomy tube and SMA stent 10/2021, ultimately discharged to [facility] rehab and then returned to [facility] for trach decannulation 12/2, readmitted in 2/2022 with recurrent COVID-19 infection, initially in distributive shock. Blood cultures were also positive for serratia marcescens which he has had in the past.   Pt was transferred to [facility] CICU from [facility]. He was sent to their ED from a rehab center due to AMS and tachycardia. At [facility], they noted a WBC greater than 20, tachycardia and hypercarbia on ABG. Pt was placed on BiPAP and transferred to [facility] CICU for  sepsis vs septic shock. Geriatrics and Palliative Medicine (GaP) Team was called for goals of care

## 2022-03-10 NOTE — PROVIDER CONTACT NOTE (OTHER) - ACTION/TREATMENT ORDERED:
As per NP, medicine & HF team, check Mg level and replete per order. Lokelma x1 per order. Toprol to be started per order. Will continue to monitor patient

## 2022-03-10 NOTE — PROGRESS NOTE ADULT - PROBLEM SELECTOR PLAN 1
2/28 BCx + serratia/GNR  - IV abx per ID. currently on cefepime.  - CT C/A/P showing small bilateral pleural effusions, small volume ascites, which is new, decreased splenic lesion, aortic thrombus narrowing the lumen at least 505 at the infrarenal segment, increased from prior  - home mirtazapine and gabapentin discontinued given lethargy  - appreciate palliative care input regarding GOC and PEG placement; pending speech and swallow eval today  - will follow ID recs regarding PICC line placement

## 2022-03-10 NOTE — PROGRESS NOTE ADULT - PROBLEM SELECTOR PLAN 2
requiring pressors titrated off and now on midodrine, stopped.  due to serratia bacteremia-unclear source  bcx 3/2 NTD, afebrile since 3/1, Leukocytosis overall improved   CVP was low despite albumin and IVF in CICU   ?purulent sputum coming out from previous trach site - evaluated by ENT and felt no signs of infection   CXR no PNA  CTH unrevealing  LVAD exit sites shows no signs of infection  Perc dawn tube site shows no signs of infection  CT showed abdominal aortic thrombus (?infected) and a 2.6cm splenic lesion (?abscess)  initially on vancomycin (2/28-3/1) and now on cefepime alone (2/28 - )  ID following  -trend WBC  -cont abx

## 2022-03-10 NOTE — PROGRESS NOTE ADULT - ASSESSMENT
Assessment and Recommendation:   · Assessment	  Assessment and recommendation :  Acute hypercapnic respiratory Failure weaned of  positive pressure non invasive ventilator   Septic shock off vasopressin  Bacteremia with serratia marcescens  Severe anemia with low H/H   S/P transfuse one unit of Packed RBCs   Severe ischemic cardiomyopathy   ACC/AHA stage D systolic heart failure  Peripheral vascular Disease   lokelma for hyperkalemia   PAF on no ACO   H/O of repeated GI bleeding   S/P mesenteric ischemia   GERD on Reglan   S/P HM2 LVAD , VT on Mexiletine   hyperthyroidism on  methimazole   severe protein caloric malnutrition   severe neuropathy   Major depression   S/P tracheostomy X2  pan culture blood and urine done   Continue Antibiotic cefepime   NG tube feeding   GI prophylaxis   case discussed with CICU   patient is DNR/DNI

## 2022-03-10 NOTE — PROGRESS NOTE ADULT - SUBJECTIVE AND OBJECTIVE BOX
RICKY JOINT  MRN#: [id number]  Subjective: pulmonary progress note  : acute hypercapnic respiratory failure . Septic shock , service rendered on 2002   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 s/p HM2 LVAD in 2017 at  (due to severe PAD) with TV ring, multiple GI bleeds, thus maintained off AC, CKD 3prior COVID-19 infection in 2020, recurrent syncope post LVAD s/p ILR, s/p prolonged complex hospitalization from -2021 initially admitted with abdominal pain complicated by GIB, respiratory failure s/p trach, multiple infections, s/p cholecystotomy tube and SMA stent 10/2021, ultimately discharged to [facility] rehab and then returned to [facility] for trach decannulation , readmitted in 2022 with recurrent COVID-19 infection, initially in distributive shock. Blood cultures were also positive for serratia marcescens which he has had in the past. seen by heart failure team patient is DNR/DNI  continue on 3 liter nasal canula flat Affect , hyperkalemia lokelma          (2022 22:20)    PAST MEDICAL & SURGICAL HISTORY:  CHF (congestive heart failure)    CAD (coronary artery disease)  Depression    Pleural effusion    History of 2019 novel coronavirus disease (COVID-19)  2020    Hemorrhoids    Bleeding hemorrhoids    Peripheral arterial disease    Claudication    BPH with urinary obstruction    ACC/AHA stage D systolic heart failure    Anticoagulation goal of INR 2.0 to 2.5    Falls    Clavicle fracture    CKD (chronic kidney disease), stage III    Iron deficiency anemia    H/O epistaxis    Vertigo    GI bleed    S/P TVR (tricuspid valve repair)    S/P ventricular assist device    S/P endoscopy    H/O tracheostomy        FAMILY HISTORY:    Social History:    Marital Status:  X    (   ) Single    (   )    (  )   Occupation: Retired   Lives with: (  ) alone  (  ) children  X spouse   (  ) parents  (  ) other    Substance Use (street drugs): X never used  (  ) other:  Tobacco Usage: X never smoked   (   ) former smoker   (   ) current smoker  (     ) pack year  (    ) last cigarette date  Alcohol Usage: Social         OBJECTIVE:  ICU Vital Signs Last 24 Hrs  T(C): 37.2 (01 Mar 2022 16:00), Max: 38.4 (01 Mar 2022 09:00)  T(F): 99 (01 Mar 2022 16:00), Max: 101.1 (01 Mar 2022 09:00)  HR: 109 (01 Mar 2022 18:00) (67 - 124)  BP: --  BP(mean): 86 (2022 20:00) (86 - 86)  ABP: 89/74 (01 Mar 2022 18:00) (75/63 - 160/85)  ABP(mean): 81 (01 Mar 2022 18:00) (68 - 139)  RR: 38 (01 Mar 2022 18:00) (5 - 40)  SpO2: 99% (01 Mar 2022 18:00) (94% - 100%)       @ 07: @ 07:00  --------------------------------------------------------  IN: 1060.7 mL / OUT: 1600 mL / NET: -539.3 mL     @ 07: @ 18:22  --------------------------------------------------------  IN: 677.8 mL / OUT: 455 mL / NET: 222.8 mL    PHYSICAL EXAM :Daily Height in cm: 177.8 (2022 21:20)  Elderly frail male on 3 liter nasal cannula    Daily Weight in k.7 (2022 21:20)  HEENT:     + NCAT  + EOMI  - Conjuctival edema   - Icterus   - Thrush   - ETT  - NGT/OGT  Neck:         + FROM RT IJ  JVD     - Nodes     - Masses    + Mid-line trachea   - Tracheostomy  Chest:           mild kyphosis   Lungs:          + CTA  + Rhonchi  + Rales    - Wheezing  + Decreased BS   - Dullness R L  Cardiac:       + S1 + S2    + RRR   - Irregular   - S3  - S4    - Murmurs   - Rub   - Hamman’s sign   Abdomen:    + BS     + Soft    + Non-tender  - Distended  - Organomegaly  - PEG Cholecystostomy tube in place   Extremities:   - Cyanosis U/L   - Clubbing  U/L  - LE/UE Edema   + Capillary refill    + Pulses   Neuro:        + Awake   +  Alert   - Confused   - Lethargic   - Sedated   - Generalized Weakness  Skin:        - Rashes    - Erythema   + Normal incisions   + IV sites intact  -      HOSPITAL MEDICATIONS: All mediciations reviewed and analyzed  MEDICATIONS  (STANDING):  albumin human  5% IVPB 250 milliLiter(s) IV Intermittent once  cefepime   IVPB 2000 milliGRAM(s) IV Intermittent every 12 hours  chlorhexidine 2% Cloths 1 Application(s) Topical <User Schedule>  cholecalciferol 1000 Unit(s) Oral daily  dextrose 40% Gel 15 Gram(s) Oral once  dextrose 50% Injectable 25 Gram(s) IV Push once  gabapentin Solution 100 milliGRAM(s) Oral three times a day  glucagon  Injectable 1 milliGRAM(s) IntraMuscular once  insulin lispro (ADMELOG) corrective regimen sliding scale   SubCutaneous every 6 hours  magnesium sulfate  IVPB 1 Gram(s) IV Intermittent once  methimazole 10 milliGRAM(s) Oral daily  metoclopramide 10 milliGRAM(s) Oral four times a day  mexiletine 150 milliGRAM(s) Oral every 8 hours  mirtazapine 7.5 milliGRAM(s) Oral at bedtime  multivitamin 1 Tablet(s) Oral daily  pantoprazole  Injectable 40 milliGRAM(s) IV Push daily  potassium phosphate IVPB 15 milliMole(s) IV Intermittent once  senna 2 Tablet(s) Oral at bedtime  sertraline 100 milliGRAM(s) Oral daily  vancomycin  IVPB 750 milliGRAM(s) IV Intermittent every 24 hours  vasopressin Infusion 0.02 Unit(s)/Min (1.2 mL/Hr) IV Continuous <Continuous>    MEDICATIONS  (PRN):    LABS: All Lab data reviewed and analyzed                        9.2    12 )-----------( 204      ( 10 Mar 2022 06:26 )             29.7   03-10    128<L>  |  96  |  26<H>  ----------------------------<  149<H>  5.5<H>   |  24  |  0.98    Ca    9.3      10 Mar 2022 06:26  Phos  3.0     03-10  Mg     1.6     03-10    TPro  8.3  /  Alb  3.0<L>  /  TBili  0.4  /  DBili  x   /  AST  35  /  ALT  23  /  AlkPhos  166<H>      Ca    8.9      08 Mar 2022 04:59  Phos  3.1     -  Mg     1.9         TPro  7.8  /  Alb  2.9<L>  /  TBili  0.3  /  DBili  x   /  AST  40  /  ALT  24  /  AlkPhos  156<H>                 PTT - ( 01 Mar 2022 00:08 )  PTT:25.0 sec LIVER FUNCTIONS - ( 01 Mar 2022 17:38 )  Alb: 2.9 g/dL / Pro: 7.3 g/dL / ALK PHOS: 116 U/L / ALT: 30 U/L / AST: 29 U/L / GGT: x           RADIOLOGY: - Reviewed and analyzed

## 2022-03-10 NOTE — PROGRESS NOTE ADULT - PROBLEM SELECTOR PLAN 3
EF 10%  s/p LVAD without s/s of pump malfunction  holding BB and spironolactone given low bp  recurrent NSVT/VT when he first arrived started on lidocaine infusion with improvement now back on mexiletine  HF team following  GOC discussion

## 2022-03-10 NOTE — PROGRESS NOTE ADULT - SUBJECTIVE AND OBJECTIVE BOX
Patient is a 66y old  Male who presents with a chief complaint of Septic shock (10 Mar 2022 11:06)      INTERVAL History of Present Illness/OVERNIGHT EVENTS: await Pall care/family decision re: PEG    MEDICATIONS  (STANDING):  cefepime   IVPB 1000 milliGRAM(s) IV Intermittent every 12 hours  chlorhexidine 2% Cloths 1 Application(s) Topical <User Schedule>  cholecalciferol 2000 Unit(s) Oral daily  insulin lispro (ADMELOG) corrective regimen sliding scale   SubCutaneous every 6 hours  magnesium sulfate  IVPB 2 Gram(s) IV Intermittent once  magnesium sulfate  IVPB 2 Gram(s) IV Intermittent once  methimazole 10 milliGRAM(s) Oral daily  metoprolol succinate ER 25 milliGRAM(s) Oral daily  mexiletine 150 milliGRAM(s) Oral every 8 hours  mirtazapine 7.5 milliGRAM(s) Oral at bedtime  multivitamin 1 Tablet(s) Oral daily  pantoprazole  Injectable 40 milliGRAM(s) IV Push daily  senna 2 Tablet(s) Oral at bedtime  sertraline 100 milliGRAM(s) Oral daily  traMADol 25 milliGRAM(s) Oral once    MEDICATIONS  (PRN):      Allergies    Amiodarone Hydrochloride (Other (Severe))    Intolerances        REVIEW OF SYSTEMS:  Negative unless otherwise specified above.    Vital Signs Last 24 Hrs  T(C): 36.7 (10 Mar 2022 11:07), Max: 37 (09 Mar 2022 23:06)  T(F): 98.1 (10 Mar 2022 11:07), Max: 98.6 (09 Mar 2022 23:06)  HR: 98 (10 Mar 2022 11:07) (95 - 106)  BP: 92/73 (10 Mar 2022 11:07) (92/73 - 132/73)  BP(mean): 78 (10 Mar 2022 11:15) (75 - 87)  RR: 18 (10 Mar 2022 11:07) (17 - 18)  SpO2: 100% (10 Mar 2022 11:07) (97% - 100%)        PHYSICAL EXAM:  GENERAL: No apparent distress, appears chronically ill  HEAD:  Atraumatic, Normocephalic  EYES: Conjunctiva and sclera clear, no discharge  ENMT: NGT, no discharge  NECK: Supple, +tracheostomy site dressing  CHEST/LUNG: Air entry bilaterally, no wheeze or rales  HEART: Regular rhythm  ABDOMEN: Soft, Nontender, +PC drain, RLQ driveline  EXTREMITIES:  No clubbing, cyanosis or edema  NERVOUS SYSTEM: Opens eyes, does not follow commands         LABS:                        9.2    12.62 )-----------( 204      ( 10 Mar 2022 06:26 )             29.7     10 Mar 2022 06:26    128    |  96     |  26     ----------------------------<  149    5.5     |  24     |  0.98     Ca    9.3        10 Mar 2022 06:26  Phos  3.0       10 Mar 2022 06:26  Mg     1.6       10 Mar 2022 06:26          CAPILLARY BLOOD GLUCOSE      POCT Blood Glucose.: 142 mg/dL (10 Mar 2022 11:48)  POCT Blood Glucose.: 143 mg/dL (10 Mar 2022 05:50)  POCT Blood Glucose.: 128 mg/dL (10 Mar 2022 00:09)  POCT Blood Glucose.: 46 mg/dL (10 Mar 2022 00:08)  POCT Blood Glucose.: 163 mg/dL (09 Mar 2022 17:56)      RADIOLOGY & ADDITIONAL TESTS:      Images reviewed personally    Consultant Notes Reviewed and Care Discussed with relevant Consultants.

## 2022-03-10 NOTE — PROGRESS NOTE ADULT - SUBJECTIVE AND OBJECTIVE BOX
HPI:  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 s/p HM2 LVAD in 9/2017 at  (due to severe PAD) with TV ring, multiple GI bleeds, thus maintained off AC, CKD 3prior COVID-19 infection in 4/2020, recurrent syncope post LVAD s/p ILR, s/p prolonged complex hospitalization from 6/14-11/16/2021 initially admitted with abdominal pain complicated by GIB, respiratory failure s/p trach, multiple infections, s/p cholecystotomy tube and SMA stent 10/2021, ultimately discharged to [facility] rehab and then returned to [facility] for trach decannulation 12/2, readmitted in 2/2022 with recurrent COVID-19 infection, initially in distributive shock. Blood cultures were also positive for serratia marcescens which he has had in the past.     Pt was transferred to [facility] CICU from [facility]. He was sent to their ED from a rehab center due to AMS and tachycardia. At [facility], they noted a WBC greater than 20, tachycardia and hypercarbia on ABG. Pt was placed on BiPAP and transferred to [facility] CICU for concern for sepsis vs septic shock.      (27 Feb 2022 22:20)    Collateral info was gathered from the patient's sister (HCP) that indicated, in the [place], the patient also had a fall. However, CT Head dated 2/28 is negative for acute events.     3/1 The patient was lethargic, non verbal, and not able to f/u commands. He was having mild respiratory distress.   3/3 Though more alert, he is still lethargic, not able to f/u commands, and non verbal. He is still on pressors and with a mild degree of respiratory distress.   3/9 The patient was more alert but still lethargic and not really able to express and understand about his medical issues or the risks and benefits of his Rxs. He denied pain or dyspnea. He appeared to be depressed. He is extremely weak and debilitated.    3/10 The patient was deeply lethargic and barely able to open his eyes. He was not following up any commands. He did not appear to be on any acute distress.     PERTINENT PM/SXH:   No pertinent past medical history    CHF (congestive heart failure)    CAD (coronary artery disease)    Depression    Pleural effusion    History of 2019 novel coronavirus disease (COVID-19)    Hemorrhoids    Bleeding hemorrhoids    Peripheral arterial disease    Claudication    BPH with urinary obstruction    ACC/AHA stage D systolic heart failure    Anticoagulation goal of INR 2.0 to 2.5    Falls    Clavicle fracture    CKD (chronic kidney disease), stage III    Iron deficiency anemia    H/O epistaxis    Vertigo    GI bleed      No significant past surgical history    S/P TVR (tricuspid valve repair)    S/P ventricular assist device    S/P endoscopy    H/O tracheostomy      FAMILY HISTORY:    ITEMS NOT CHECKED ARE NOT PRESENT    SOCIAL HISTORY:   Significant other/partner[ ]  Children[x ]  Catholic/Spirituality:  Substance hx:  [ ]   Tobacco hx:  [ ]   Alcohol hx: [ ]   Home Opioid hx:  [ ] I-Stop Reference No:  Living Situation: [ ]Home  [ ]Long term care  [ ]Rehab [ ]Other    ADVANCE DIRECTIVES:    DNR  MOLST  [ ]  Living Will  [ ]   DECISION MAKER(s):  [x ] Health Care Proxy(s)  [ ] Surrogate(s)  [ ] Guardian           Name(s): Phone Number(s): Cristina Rudolph [phone]    BASELINE (I)ADL(s) (prior to admission):  Bureau: [ ]Total  [ ] Moderate [ x]Dependent    Allergies    Amiodarone Hydrochloride (Other (Severe))    Intolerances    MEDICATIONS  (STANDING):  ascorbic acid 500 milliGRAM(s) Oral daily  cefepime   IVPB 1000 milliGRAM(s) IV Intermittent every 12 hours  chlorhexidine 2% Cloths 1 Application(s) Topical <User Schedule>  cholecalciferol 2000 Unit(s) Oral daily  insulin lispro (ADMELOG) corrective regimen sliding scale   SubCutaneous every 6 hours  methimazole 10 milliGRAM(s) Oral daily  metoprolol succinate ER 25 milliGRAM(s) Oral daily  mexiletine 150 milliGRAM(s) Oral every 8 hours  mirtazapine 7.5 milliGRAM(s) Oral at bedtime  multivitamin 1 Tablet(s) Oral daily  pantoprazole  Injectable 40 milliGRAM(s) IV Push daily  senna 2 Tablet(s) Oral at bedtime  sertraline 100 milliGRAM(s) Oral daily  thiamine 100 milliGRAM(s) Oral daily  traMADol 25 milliGRAM(s) Oral once    MEDICATIONS  (PRN):        PRESENT SYMPTOMS: [x ]Unable to obtain due to poor mentation   Source if other than patient:  [ ]Family   [ ]Team     Pain: [ ]yes [x ]no  QOL impact -   Location -                    Aggravating factors -  Quality -  Radiation -  Timing-  Severity (0-10 scale):  Minimal acceptable level (0-10 scale):     CPOT:    https://www.[facility].org/getattachment/yfw93k77-5q1p-4q9p-1h9q-6912l6368x0u/Critical-Care-Pain-Observation-Tool-(CPOT)      PAIN AD Score: 0    http://geriatrictoolkit.[facility]/cog/painad.pdf (press ctrl +  left click to view)  RDOS scale (https://pubmed.ncbi.nlm.nih.gov/27152736/, https://www.[employer] [facility]/doi/10.1089/jpm.2009.0229?url_ver=Z39.&rfr_id=mala:rid:crossref.org&rfr_dat=cr_pub%20%200pubmed)    A score of 0 to 2 signifies little or no respiratory distress, 3 signifies mild distress, scores 4 to 6 indicate moderate distress, and scores greater than 7 signify severe distress  RDOS score: 0    Dyspnea:                           [ ]Mild [ ]Moderate [ ]Severe  Anxiety:                             [ ]Mild [ ]Moderate [ ]Severe  Fatigue:                             [ ]Mild [ ]Moderate [ ]Severe  Nausea:                             [ ]Mild [ ]Moderate [ ]Severe  Loss of appetite:              [ ]Mild [ ]Moderate [ ]Severe  Constipation:                    [ ]Mild [ ]Moderate [ ]Severe    Other Symptoms:  [ ]All other review of systems negative     Palliative Performance Status Version 2: 20        %    http://npcrc.org/files/news/palliative_performance_scale_ppsv2.pdf  PHYSICAL EXAM:  Vital Signs Last 24 Hrs  T(C): 36.6 (10 Mar 2022 15:22), Max: 37 (09 Mar 2022 23:06)  T(F): 97.8 (10 Mar 2022 15:22), Max: 98.6 (09 Mar 2022 23:06)  HR: 90 (10 Mar 2022 15:22) (90 - 106)  BP: 93/74 (10 Mar 2022 15:22) (92/73 - 132/73)  BP(mean): 79 (10 Mar 2022 15:22) (75 - 87)  RR: 18 (10 Mar 2022 15:22) (18 - 18)  SpO2: 100% (10 Mar 2022 15:22) (97% - 100%)    GENERAL:  [ ]Alert  [ ]Oriented x   [x ]Lethargic  [ ]Cachexia  [ ]Unarousable  [ ]Verbal  [x ]Non-Verbal  Behavioral:   [ ] Anxiety  [ ] Delirium [ ] Agitation [x ] Other: Encephalopathic   HEENT:  [ ]Normal   [ x]Dry mouth   [ ]ET Tube/Trach  [ ]Oral lesions  PULMONARY:   [ ]Clear [ ]Tachypnea  [ ]Audible excessive secretions   [ ]Rhonchi        [ ]Right [ ]Left [ ]Bilateral  [ ]Crackles        [ ]Right [ ]Left [ ]Bilateral  [ ]Wheezing     [ ]Right [ ]Left [ ]Bilateral  [x ]Diminished breath sounds [ ]right [ ]left [x ]bilateral  CARDIOVASCULAR:    [x ]Regular [ ]Irregular [ ]Tachy  [ ]Jose [ ]Murmur [ ]Other  [x] LVAD hum  GASTROINTESTINAL:  [ ]Soft  [ ]Distended   [ ]+BS  [ ]Non tender [ ]Tender  [ ]PEG [ ]OGT/ NGT  Last BM: 3/9  GENITOURINARY:  [ ]Normal [x ] Incontinent   [ ]Oliguria/Anuria   [ ]Landrum  MUSCULOSKELETAL:   [ ]Normal   [x ]Weakness  [ ]Bed/Wheelchair bound [ ]Edema  NEUROLOGIC:   [ ]No focal deficits  [ x]Cognitive impairment  [ ]Dysphagia [ ]Dysarthria [ ]Paresis [ ]Other   SKIN:   [ ]Normal    [ ]Rash  [ ]Pressure ulcer(s)       Present on admission [ ]y [ ]n  [x] BL Heel and R Buttock DTI   CRITICAL CARE:  [ ] Shock Present  [ ]Septic [ ]Cardiogenic [ ]Neurologic [ ]Hypovolemic  [ ]  Vasopressors [ ]  Inotropes   [ ]Respiratory failure present [ ]Mechanical ventilation [ ]Non-invasive ventilatory support [ ]High flow    [ ]Acute  [ ]Chronic [ ]Hypoxic  [ ]Hypercarbic [ ]Other  [ ]Other organ failure     LABS:                                   9.2    12.62 )-----------( 204      ( 10 Mar 2022 06:26 )             29.7   03-10    128<L>  |  96  |  26<H>  ----------------------------<  149<H>  5.5<H>   |  24  |  0.98    Ca    9.3      10 Mar 2022 06:26  Phos  3.0     03-10  Mg     1.6     03-10    TPro  8.3  /  Alb  3.0<L>  /  TBili  0.4  /  DBili  x   /  AST  35  /  ALT  23  /  AlkPhos  166<H>  03-09        RADIOLOGY & ADDITIONAL STUDIES:  Reviewed   PROTEIN CALORIE MALNUTRITION PRESENT: [ ]mild [ ]moderate [ ]severe [ ]underweight [ ]morbid obesity  https://www.andeal.org/vault/2440/web/files/ONC/Table_Clinical%20Characteristics%20to%20Document%20Malnutrition-White%20JV%20et%20al%202012.pdf    Height (cm): 177.8 (02-27-22 @ 21:20), 182.9 (12-13-21 @ 04:30), 165.1 (12-02-21 @ 13:55)  Weight (kg): 48.7 (02-27-22 @ 21:20), 52.6 (01-12-22 @ 12:50), 53.6 (12-02-21 @ 13:55)  BMI (kg/m2): 15.4 (02-27-22 @ 21:20), 15.7 (01-12-22 @ 12:50), 16 (12-13-21 @ 04:30)    [ ]PPSV2 < or = to 30% [ ]significant weight loss  [ ]poor nutritional intake  [ ]anasarca      [ ]Artificial Nutrition      REFERRALS:   [ ]Chaplaincy  [ ]Hospice  [ ]Child Life  [ ]Social Work  [ ]Case management [ ]Holistic Therapy     Goals of Care Document:

## 2022-03-10 NOTE — PROGRESS NOTE ADULT - SUBJECTIVE AND OBJECTIVE BOX
INFECTIOUS DISEASES FOLLOW UP-- Anitra Prater  [phone]    This is a follow up note for this  66yMale with  Sepsis        ROS:  CONSTITUTIONAL:  No fever, good appetite  CARDIOVASCULAR:  No chest pain or palpitations  RESPIRATORY:  No dyspnea  GASTROINTESTINAL:  No nausea, vomiting, diarrhea, or abdominal pain  GENITOURINARY:  No dysuria  NEUROLOGIC:  No headache,     Allergies    Amiodarone Hydrochloride (Other (Severe))    Intolerances        ANTIBIOTICS/RELEVANT:  antimicrobials  cefepime   IVPB 1000 milliGRAM(s) IV Intermittent every 12 hours    immunologic:    OTHER:  ascorbic acid 500 milliGRAM(s) Oral daily  chlorhexidine 2% Cloths 1 Application(s) Topical <User Schedule>  cholecalciferol 2000 Unit(s) Oral daily  insulin lispro (ADMELOG) corrective regimen sliding scale   SubCutaneous every 6 hours  methimazole 10 milliGRAM(s) Oral daily  metoprolol succinate ER 25 milliGRAM(s) Oral daily  mexiletine 150 milliGRAM(s) Oral every 8 hours  mirtazapine 7.5 milliGRAM(s) Oral at bedtime  multivitamin 1 Tablet(s) Oral daily  pantoprazole  Injectable 40 milliGRAM(s) IV Push daily  senna 2 Tablet(s) Oral at bedtime  sertraline 100 milliGRAM(s) Oral daily  thiamine 100 milliGRAM(s) Oral daily  traMADol 25 milliGRAM(s) Oral once      Objective:  Vital Signs Last 24 Hrs  T(C): 36.6 (10 Mar 2022 15:22), Max: 37 (09 Mar 2022 23:06)  T(F): 97.8 (10 Mar 2022 15:22), Max: 98.6 (09 Mar 2022 23:06)  HR: 90 (10 Mar 2022 15:22) (90 - 106)  BP: 93/74 (10 Mar 2022 15:22) (92/73 - 132/73)  BP(mean): 79 (10 Mar 2022 15:22) (75 - 87)  RR: 18 (10 Mar 2022 15:22) (18 - 18)  SpO2: 100% (10 Mar 2022 15:22) (97% - 100%)    PHYSICAL EXAM:  Constitutional:no acute distress  Eyes:ALONSO, EOMI  Ear/Nose/Throat: no oral lesions, 	  Respiratory: clear BL  Cardiovascular: S1S2  Gastrointestinal:soft, (+) BS, no tenderness  Extremities:no e/e/c  No Lymphadenopathy  IV sites not inflammed.    LABS:                        9.2    12.62 )-----------( 204      ( 10 Mar 2022 06:26 )             29.7     03-10    128<L>  |  96  |  26<H>  ----------------------------<  149<H>  5.5<H>   |  24  |  0.98    Ca    9.3      10 Mar 2022 06:26  Phos  3.0     03-10  Mg     1.6     03-10    TPro  8.3  /  Alb  3.0<L>  /  TBili  0.4  /  DBili  x   /  AST  35  /  ALT  23  /  AlkPhos  166<H>  03-09          MICROBIOLOGY:            RECENT CULTURES:  03-06 @ 14:17  .Blood Blood-Arterial  --  --  --    No growth to date.  --  03-05 @ 15:33  .Sputum Trach Site  --  --  --    Normal Respiratory Bria present  --      RADIOLOGY & ADDITIONAL STUDIES:  < from: Xray Chest 1 View- PORTABLE-Routine (Xray Chest 1 View- PORTABLE-Routine .) (03.08.22 @ 05:19) >    Frontal expiratory view of the chest shows the heart to be normal in   size. Right jugular catheter, sternal wires, loop recorder, feeding tube   and LVAD are present, although the feeding tube tip is not included.    The lungs show mild left base atelectasis or small infiltrate and there   is no evidence of pneumothorax nor pleural effusion.    IMPRESSION:  Lines as noted.    < end of copied text >   INFECTIOUS DISEASES FOLLOW UP-- Anitra Prater  [phone]    This is a follow up note for this  66yMale with  Sepsis        ROS:  CONSTITUTIONAL:  No fever,   CARDIOVASCULAR:  No chest pain or palpitations  RESPIRATORY:  No dyspnea  GASTROINTESTINAL:  No nausea, vomiting, diarrhea, cholecystotomy tube in place  GENITOURINARY:  No dysuria  NEUROLOGIC:  No headache,     Allergies    Amiodarone Hydrochloride (Other (Severe))    Intolerances        ANTIBIOTICS/RELEVANT:  antimicrobials  cefepime   IVPB 1000 milliGRAM(s) IV Intermittent every 12 hours    immunologic:    OTHER:  ascorbic acid 500 milliGRAM(s) Oral daily  chlorhexidine 2% Cloths 1 Application(s) Topical <User Schedule>  cholecalciferol 2000 Unit(s) Oral daily  insulin lispro (ADMELOG) corrective regimen sliding scale   SubCutaneous every 6 hours  methimazole 10 milliGRAM(s) Oral daily  metoprolol succinate ER 25 milliGRAM(s) Oral daily  mexiletine 150 milliGRAM(s) Oral every 8 hours  mirtazapine 7.5 milliGRAM(s) Oral at bedtime  multivitamin 1 Tablet(s) Oral daily  pantoprazole  Injectable 40 milliGRAM(s) IV Push daily  senna 2 Tablet(s) Oral at bedtime  sertraline 100 milliGRAM(s) Oral daily  thiamine 100 milliGRAM(s) Oral daily  traMADol 25 milliGRAM(s) Oral once      Objective:  Vital Signs Last 24 Hrs  T(C): 36.6 (10 Mar 2022 15:22), Max: 37 (09 Mar 2022 23:06)  T(F): 97.8 (10 Mar 2022 15:22), Max: 98.6 (09 Mar 2022 23:06)  HR: 90 (10 Mar 2022 15:22) (90 - 106)  BP: 93/74 (10 Mar 2022 15:22) (92/73 - 132/73)  BP(mean): 79 (10 Mar 2022 15:22) (75 - 87)  RR: 18 (10 Mar 2022 15:22) (18 - 18)  SpO2: 100% (10 Mar 2022 15:22) (97% - 100%)    PHYSICAL EXAM:  Constitutional:no acute distress  Eyes:ALONSO, EOMI  Ear/Nose/Throat: no oral lesions, 	  Respiratory: clear BL  Cardiovascular: S1S2  Gastrointestinal:soft, (+) BS, no tenderness, cholecystotomy tube in place  Extremities:no e/e/c  No Lymphadenopathy  IV sites not inflammed.    LABS:                        9.2    12.62 )-----------( 204      ( 10 Mar 2022 06:26 )             29.7     03-10    128<L>  |  96  |  26<H>  ----------------------------<  149<H>  5.5<H>   |  24  |  0.98    Ca    9.3      10 Mar 2022 06:26  Phos  3.0     03-10  Mg     1.6     03-10    TPro  8.3  /  Alb  3.0<L>  /  TBili  0.4  /  DBili  x   /  AST  35  /  ALT  23  /  AlkPhos  166<H>  03-09          MICROBIOLOGY:            RECENT CULTURES:  03-06 @ 14:17  .Blood Blood-Arterial  --  --  --    No growth to date.  --  03-05 @ 15:33  .Sputum Trach Site  --  --  --    Normal Respiratory Bria present  --      RADIOLOGY & ADDITIONAL STUDIES:  < from: Xray Chest 1 View- PORTABLE-Routine (Xray Chest 1 View- PORTABLE-Routine .) (03.08.22 @ 05:19) >    Frontal expiratory view of the chest shows the heart to be normal in   size. Right jugular catheter, sternal wires, loop recorder, feeding tube   and LVAD are present, although the feeding tube tip is not included.    The lungs show mild left base atelectasis or small infiltrate and there   is no evidence of pneumothorax nor pleural effusion.    IMPRESSION:  Lines as noted.    < end of copied text >

## 2022-03-10 NOTE — PROGRESS NOTE ADULT - PROBLEM SELECTOR PLAN 5
- history of thyrotoxicosis with amiodarone, on methimazole  - continue mexiletine (can increase dose if needed) and will resume BB as above  - EP following  - will add on mag level to AM labs

## 2022-03-10 NOTE — SWALLOW BEDSIDE ASSESSMENT ADULT - COMMENTS
Continued history: Pt was transferred to [facility] CICU from [facility]. He was sent to their ED from a rehab center due to AMS and tachycardia. At [facility], they noted a WBC greater than 20, tachycardia and hypercarbia on ABG. Pt was placed on BiPAP and transferred to [facility] CICU for concern for sepsis vs septic shock.   - Reglan ordered for gut motility  - ENT was consulted for possible tracheal infections. As per primary team, pt was noted to have secretions from the stoma that appeared purulent.  -2/28 ENT was consulted for possible tracheitis. Bedside tracheoscopy was unremarkable, no purulence noted around the stoma or in the trachea. No evidence of tracheal infection. WBC 16.79  -As per MD notation; “ENT states no sign of infection at Stoma site- Plan for possible TE fistula closure when pt is stable as per ENT. Spoke to ENT 3/7 in regards to fistula closure and states will do as an outpatient once off supplemental oxygen for several weeks. No plan for any closure while inpatient.”    - GOC discussion had with palliative team and patient made DNR/DNI 3/3.    SEE ADDENDUM FOR SWALLOW HISTORY:

## 2022-03-10 NOTE — PROGRESS NOTE ADULT - PROBLEM SELECTOR PLAN 4
- Speed 9200  - Daily LDH, currently stable  - MAP goal 70-80; will resume BB as above  - no AC given history of GIB

## 2022-03-10 NOTE — SWALLOW BEDSIDE ASSESSMENT ADULT - SWALLOW EVAL: DIAGNOSIS
Interactive discussion was held w/ team via phone, NP and ENT, with regards of SLP for swallowing evaluation in presence of KNOWN TEF. At this time, administration of po is CONTRAINDICATED in presence of TE fistula, therefore this service to sign off. Please re-consult when/if pt becomes a safe candidate given medical condition. Lisa Fernandez MS CCC-SLP pager [phone] Patient presenting with AMS with Serratia bacteremia x4. Initially presenting with hypercapnic resp failure requiring BiPAP. Interactive discussion was held w/ team via phone, NP and ENT, with regards of SLP for swallowing evaluation in presence of KNOWN TEF. At this time, administration of po is CONTRAINDICATED in presence of TE fistula, therefore this service to sign off. Please re-consult when/if pt becomes a safe candidate given medical condition. Lisa Fernandez MS CCC-SLP pager [phone]

## 2022-03-10 NOTE — PROGRESS NOTE ADULT - PROBLEM SELECTOR PLAN 2
Multifactorial (2/2 to infection, metabolic issues, hospitalization, depression)  Work up and Rx as per primary team and ID.

## 2022-03-10 NOTE — PROGRESS NOTE ADULT - PROBLEM SELECTOR PLAN 1
suspect secondary to infection with diffuse cerebral dysfunction.   CT head x 2 negative for acute infarct  unable to do MRI d/t LVAD  EEG negative for seizure activity  neuro recs noted  ammonia low, B1 pending  home gabapentin discontinued given lethargy  GOC discussion re: end of life care and feeding tube

## 2022-03-10 NOTE — GOALS OF CARE CONVERSATION - ADVANCED CARE PLANNING - CONVERSATION DETAILS
I gave and update to the patient's HCP about the patient's current state (this afternoon, with worsening lethargy and not really able to f/u commands). I indicated that speech and swallow was not able to eval the patient since "administration of po is CONTRAINDICATED in presence of TE fistula." I also indicated that with the patient's current poor mental state that oral feeding was also not safe due to the risk of aspiration. Cristina indicated that she and the patient's son Kang were willing to go for a PEG if that was going to help the patient out. I indicated that a permanent feeding tube was not going to make the patient's infection better. I also indicated that if, after 10 days on NGT feeds and Abx, his functionality and alertness had not improved that I doubt it a PEG was going to be able to change it. Hence, that even with a PEG he was going to require 24/7 care and full assistance with ADLs. She then asked me if he was going to be able to speak. I indicated that it was a possibility but how often and with what capacity to make sense of his own words it was difficult to determine. I told her that only time will be able to say. However, that time was also going to be affected by new infections that were likely going to further impair his capacity to improve. Cristina believed that it was impossible to know for sure that the patient was not going to have some degree of recovery (able to speak and able to have some functional improvement) and decided she wanted an eval for a PEG. I then indicated that a possibility was to keep the patient in tube feeds fro a few more days ( up to 10 days) and if not improving thinking about stopping tube feeds; however, she did not appear to be interested on that option and again suggested an eval for a PEG. I stated I was going to communicate her decision to the primary team. However, I also stated that even if she was asking for a PEG that still we need to see if it was possible since due to anatomical issues, hemodynamic stability, or procedural risks, this options may be denied. I said that If a PEG were to be possible that then the specialist in charge of that procedure was  to touch base with her.      D/w Dr. Mirella Burgos MD   Geriatrics and Palliative Care ([facility]) Consult Service    of Geriatric and Palliative Medicine  [facility]      Please page the following number for clinical matters between the hours of 9 am and 5 pm from Monday through Friday : [phone]    After 5pm and on weekends, please see the contact information below:    In the event of newly developing, evolving, or worsening symptoms, please contact the Palliative Medicine team via pager (if the patient is at [facility] #[phone] or if the patient is at [facility] #[phone]) The Geriatric and Palliative Medicine service has coverage 24 hours a day/ 7 days a week to provide medical recommendations regarding symptom management needs via telephone

## 2022-03-11 NOTE — PROGRESS NOTE ADULT - ASSESSMENT
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 s/p HM2 LVAD in 9/2017 at  (due to severe PAD) with TV ring, multiple GI bleeds, thus maintained off AC, CKD 3prior COVID-19 infection in 4/2020, recurrent syncope post LVAD s/p ILR, s/p prolonged complex hospitalization from 6/14-11/16/2021 initially admitted with abdominal pain complicated by GIB, respiratory failure s/p trach, multiple infections, s/p cholecystotomy tube and SMA stent 10/2021, ultimately discharged to [facility] rehab and then returned to [facility] for trach decannulation 12/2, readmitted in 2/2022 with recurrent COVID-19 infection, initially in distributive shock. Blood cultures were also positive for serratia marcescens which he has had in the past.   Pt was transferred to [facility] CICU from [facility]. He was sent to their ED from a rehab center due to AMS and tachycardia. At [facility], they noted a WBC greater than 20, tachycardia and hypercarbia on ABG. Pt was placed on BiPAP and transferred to [facility] CICU for  sepsis vs septic shock. Now on the floors. Geriatrics and Palliative Medicine ([facility]) Team was called for goals of care

## 2022-03-11 NOTE — PROGRESS NOTE ADULT - ASSESSMENT
Assessment and Recommendation:   · Assessment	  Assessment and recommendation :  Acute hypercapnic respiratory Failure on 3 liter on nasal cannula    S/P Septic shock   Bacteremia with serratia marcescens  Severe anemia with low H/H   S/P transfuse one unit of Packed RBCs   Severe ischemic cardiomyopathy   ACC/AHA stage D systolic heart failure  Peripheral vascular Disease   lokelma for hyperkalemia   PAF on no ACO   H/O of repeated GI bleeding   S/P mesenteric ischemia   GERD on Reglan   S/P HM2 LVAD , VT on Mexiletine   hyperthyroidism on  methimazole   severe protein caloric malnutrition   severe neuropathy   Major depression   S/P tracheostomy X2  pan culture blood and urine done   Continue Antibiotic cefepime   NG tube feeding   GI prophylaxis   patient is DNR/DNI

## 2022-03-11 NOTE — CONSULT NOTE ADULT - SUBJECTIVE AND OBJECTIVE BOX
Chief Complaint:  Patient is a 66y old  Male who presents with a chief complaint of Septic shock (10 Mar 2022 18:08)      HPI:JOINT, RICKY is a 66y Male    PMHX/PSHX:  No pertinent past medical history    CHF (congestive heart failure)    CAD (coronary artery disease)    Depression    Pleural effusion    History of 2019 novel coronavirus disease (COVID-19)    Hemorrhoids    Bleeding hemorrhoids    Peripheral arterial disease    Claudication    BPH with urinary obstruction    ACC/AHA stage D systolic heart failure    Anticoagulation goal of INR 2.0 to 2.5    Falls    Clavicle fracture    CKD (chronic kidney disease), stage III    Iron deficiency anemia    H/O epistaxis    Vertigo    GI bleed    No significant past surgical history    S/P TVR (tricuspid valve repair)    S/P ventricular assist device    S/P endoscopy    H/O tracheostomy      Allergies:  Amiodarone Hydrochloride (Other (Severe))      Home Medications: reviewed  Hospital Medications:  ascorbic acid 500 milliGRAM(s) Oral daily  cefepime   IVPB 1000 milliGRAM(s) IV Intermittent every 12 hours  chlorhexidine 2% Cloths 1 Application(s) Topical <User Schedule>  cholecalciferol 2000 Unit(s) Oral daily  insulin lispro (ADMELOG) corrective regimen sliding scale   SubCutaneous every 6 hours  methimazole 10 milliGRAM(s) Oral daily  mexiletine 150 milliGRAM(s) Oral every 8 hours  mirtazapine 7.5 milliGRAM(s) Oral at bedtime  multivitamin 1 Tablet(s) Oral daily  pantoprazole  Injectable 40 milliGRAM(s) IV Push daily  senna 2 Tablet(s) Oral at bedtime  sertraline 100 milliGRAM(s) Oral daily  thiamine 100 milliGRAM(s) Oral daily  traMADol 25 milliGRAM(s) Oral once      Social History:   Tob: Denies  EtOH: Denies  Illicit Drugs: Denies    Family history:  No pertinent family history in first degree relatives      Denies family history of colon cancer/polyps, stomach cancer/polyps, pancreatic cancer/masses, liver cancer/disease, ovarian cancer and endometrial cancer.    ROS:   General:  No  fevers, chills, night sweats, fatigue  Eyes:  Good vision, no reported pain  ENT:  No sore throat, pain, runny nose  CV:  No pain, palpitations  Pulm:  No dyspnea, cough  GI:  See HPI, otherwise negative  :  No  incontinence, nocturia  Muscle:  No pain, weakness  Neuro:  No memory problems  Psych:  No insomnia, mood problems, depression  Endocrine:  No polyuria, polydipsia, cold/heat intolerance  Heme:  No petechiae, ecchymosis, easy bruisability  Skin:  No rash    PHYSICAL EXAM:   Vital Signs:  Vital Signs Last 24 Hrs  T(C): 36.9 (11 Mar 2022 07:27), Max: 37.1 (11 Mar 2022 04:00)  T(F): 98.5 (11 Mar 2022 07:27), Max: 98.7 (11 Mar 2022 04:00)  HR: 105 (11 Mar 2022 09:00) (90 - 114)  BP: 99/70 (11 Mar 2022 07:27) (92/73 - 99/70)  BP(mean): 76 (11 Mar 2022 09:00) (72 - 88)  RR: 18 (11 Mar 2022 07:30) (18 - 22)  SpO2: 100% (11 Mar 2022 07:30) (98% - 100%)  Daily     Daily Weight in k.7 (11 Mar 2022 05:00)    GENERAL: no acute distress  NEURO: alert  HEENT: anicteric sclera, no conjunctival pallor appreciated  CHEST: no respiratory distress, no accessory muscle use  CARDIAC: regular rate, rhythm  ABDOMEN: soft, non-tender, non-distended, no rebound or guarding  EXTREMITIES: warm, well perfused, no edema  SKIN: no lesions noted    LABS: reviewed                        9.8    15.39 )-----------( 208      ( 11 Mar 2022 05:43 )             30.8     03-11    126<L>  |  93<L>  |  30<H>  ----------------------------<  101<H>  5.6<H>   |  20<L>  |  1.05    Ca    9.9      11 Mar 2022 09:11  Phos  3.9     03-11  Mg     2.2     03-11    TPro  9.8<H>  /  Alb  3.5  /  TBili  0.4  /  DBili  x   /  AST  36  /  ALT  20  /  AlkPhos  170<H>  03-11    LIVER FUNCTIONS - ( 11 Mar 2022 05:43 )  Alb: 3.5 g/dL / Pro: 9.8 g/dL / ALK PHOS: 170 U/L / ALT: 20 U/L / AST: 36 U/L / GGT: x               Diagnostic Studies: see sunrise for full report         Chief Complaint:  Patient is a 66y old  Male who presents with a chief complaint of Septic shock (10 Mar 2022 18:08)      HPI:JOINT, RICKY is a 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 s/p HM2 LVAD in 2017 at  (due to severe PAD) with TV ring, h/o mid-SB bleeding on VCE (negative on PUSH 6/15/21), subsequently maintained off AC, CKD 3, prior COVID-19 infection in 2020, recurrent syncope post LVAD s/p ILR, s/p prolonged complex hospitalization from -2021 initially admitted at that time with abdominal pain s/p VCE/PUSH 6/15/21, respiratory failure s/p trach, multiple infections, s/p cholecystotomy tube and SMA stent 10/2021, ultimately discharged to [facility] rehab and then returned to [facility] for trach decannulation , readmitted in 2022 with recurrent COVID-19 infection, initially in distributive shock with serratia marcesens, with declining condition and more lethargic/less responsive with hyponatremia, hyperkalemia, bacteremia with unknown source, multiple runs of VT (3/11) with NGT in place, GI consulted for PEG evaluation.    Patient unable to communicate, not responsive, intermittently opens eyes, not following commands. History adapted per chart and primary.    PMHX/PSHX:  No pertinent past medical history    CHF (congestive heart failure)    CAD (coronary artery disease)    Depression    Pleural effusion    History of  novel coronavirus disease (COVID-19)    Hemorrhoids    Bleeding hemorrhoids    Peripheral arterial disease    Claudication    BPH with urinary obstruction    ACC/AHA stage D systolic heart failure    Anticoagulation goal of INR 2.0 to 2.5    Falls    Clavicle fracture    CKD (chronic kidney disease), stage III    Iron deficiency anemia    H/O epistaxis    Vertigo    GI bleed    No significant past surgical history    S/P TVR (tricuspid valve repair)    S/P ventricular assist device    S/P endoscopy    H/O tracheostomy      Allergies:  Amiodarone Hydrochloride (Other (Severe))      Home Medications: reviewed  Hospital Medications:  ascorbic acid 500 milliGRAM(s) Oral daily  cefepime   IVPB 1000 milliGRAM(s) IV Intermittent every 12 hours  chlorhexidine 2% Cloths 1 Application(s) Topical <User Schedule>  cholecalciferol 2000 Unit(s) Oral daily  insulin lispro (ADMELOG) corrective regimen sliding scale   SubCutaneous every 6 hours  methimazole 10 milliGRAM(s) Oral daily  mexiletine 150 milliGRAM(s) Oral every 8 hours  mirtazapine 7.5 milliGRAM(s) Oral at bedtime  multivitamin 1 Tablet(s) Oral daily  pantoprazole  Injectable 40 milliGRAM(s) IV Push daily  senna 2 Tablet(s) Oral at bedtime  sertraline 100 milliGRAM(s) Oral daily  thiamine 100 milliGRAM(s) Oral daily  traMADol 25 milliGRAM(s) Oral once      Social History:   Unable to assess due to patient condition.    Family history:    None documented  Unable to obtain due to patient's condition    ROS:   Unable to obtain due to patient's condition.    PHYSICAL EXAM:   Vital Signs:  Vital Signs Last 24 Hrs  T(C): 36.9 (11 Mar 2022 07:27), Max: 37.1 (11 Mar 2022 04:00)  T(F): 98.5 (11 Mar 2022 07:27), Max: 98.7 (11 Mar 2022 04:00)  HR: 105 (11 Mar 2022 09:00) (90 - 114)  BP: 99/70 (11 Mar 2022 07:27) (92/73 - 99/70)  BP(mean): 76 (11 Mar 2022 09:00) (72 - 88)  RR: 18 (11 Mar 2022 07:30) (18 - 22)  SpO2: 100% (11 Mar 2022 07:30) (98% - 100%)  Daily     Daily Weight in k.7 (11 Mar 2022 05:00)    GENERAL: ill appearing  NEURO: non responsive, intermittently opens eyes  HEENT: NGT in place  CHEST: no respiratory distress, no accessory muscle use  CARDIAC: tachycardic  ABDOMEN: soft, non-tender, non-distended, no rebound or guarding  EXTREMITIES: cool to touch, no edema  SKIN: dry    LABS: reviewed                        9.8    15.39 )-----------( 208      ( 11 Mar 2022 05:43 )             30.8     03-11    126<L>  |  93<L>  |  30<H>  ----------------------------<  101<H>  5.6<H>   |  20<L>  |  1.05    Ca    9.9      11 Mar 2022 09:11  Phos  3.9     03-  Mg     2.2     -    TPro  9.8<H>  /  Alb  3.5  /  TBili  0.4  /  DBili  x   /  AST  36  /  ALT  20  /  AlkPhos  170<H>  -11    LIVER FUNCTIONS - ( 11 Mar 2022 05:43 )  Alb: 3.5 g/dL / Pro: 9.8 g/dL / ALK PHOS: 170 U/L / ALT: 20 U/L / AST: 36 U/L / GGT: x               Diagnostic Studies: see sunrise for full report         Chief Complaint:  Patient is a 66y old  Male who presents with a chief complaint of Septic shock (10 Mar 2022 18:08)      HPI: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 s/p HM2 LVAD in 2017 at  (due to severe PAD) with TV ring, h/o mid-SB bleeding on VCE (negative on PUSH 6/15/21), subsequently maintained off AC, CKD 3, prior COVID-19 infection in 2020, recurrent syncope post LVAD s/p ILR, s/p prolonged complex hospitalization from -2021 initially admitted at that time with abdominal pain s/p VCE/PUSH 6/15/21, respiratory failure s/p trach, multiple infections, s/p cholecystotomy tube and SMA stent 10/2021, with TEF, ultimately discharged to [facility] and then returned to [facility] for trach decannulation , readmitted in 2022 with recurrent COVID-19 infection, initially in distributive shock with serratia marcesens, with declining condition and more lethargic/less responsive with hyponatremia, hyperkalemia, bacteremia with unknown source, multiple runs of VT (3/11) with NGT in place, GI consulted for PEG evaluation.    Patient unable to communicate, not responsive, intermittently opens eyes, not following commands. History adapted per chart and primary.    PMHX/PSHX:  No pertinent past medical history    CHF (congestive heart failure)    CAD (coronary artery disease)    Depression    Pleural effusion    History of  novel coronavirus disease (COVID-19)    Hemorrhoids    Bleeding hemorrhoids    Peripheral arterial disease    Claudication    BPH with urinary obstruction    ACC/AHA stage D systolic heart failure    Anticoagulation goal of INR 2.0 to 2.5    Falls    Clavicle fracture    CKD (chronic kidney disease), stage III    Iron deficiency anemia    H/O epistaxis    Vertigo    GI bleed    No significant past surgical history    S/P TVR (tricuspid valve repair)    S/P ventricular assist device    S/P endoscopy    H/O tracheostomy      Allergies:  Amiodarone Hydrochloride (Other (Severe))      Home Medications: reviewed  Hospital Medications:  ascorbic acid 500 milliGRAM(s) Oral daily  cefepime   IVPB 1000 milliGRAM(s) IV Intermittent every 12 hours  chlorhexidine 2% Cloths 1 Application(s) Topical <User Schedule>  cholecalciferol 2000 Unit(s) Oral daily  insulin lispro (ADMELOG) corrective regimen sliding scale   SubCutaneous every 6 hours  methimazole 10 milliGRAM(s) Oral daily  mexiletine 150 milliGRAM(s) Oral every 8 hours  mirtazapine 7.5 milliGRAM(s) Oral at bedtime  multivitamin 1 Tablet(s) Oral daily  pantoprazole  Injectable 40 milliGRAM(s) IV Push daily  senna 2 Tablet(s) Oral at bedtime  sertraline 100 milliGRAM(s) Oral daily  thiamine 100 milliGRAM(s) Oral daily  traMADol 25 milliGRAM(s) Oral once      Social History:   Unable to assess due to patient condition.    Family history:    None documented  Unable to obtain due to patient's condition    ROS:   Unable to obtain due to patient's condition.    PHYSICAL EXAM:   Vital Signs:  Vital Signs Last 24 Hrs  T(C): 36.9 (11 Mar 2022 07:27), Max: 37.1 (11 Mar 2022 04:00)  T(F): 98.5 (11 Mar 2022 07:27), Max: 98.7 (11 Mar 2022 04:00)  HR: 105 (11 Mar 2022 09:00) (90 - 114)  BP: 99/70 (11 Mar 2022 07:27) (92/73 - 99/70)  BP(mean): 76 (11 Mar 2022 09:00) (72 - 88)  RR: 18 (11 Mar 2022 07:30) (18 - 22)  SpO2: 100% (11 Mar 2022 07:30) (98% - 100%)  Daily     Daily Weight in k.7 (11 Mar 2022 05:00)    GENERAL: ill appearing  NEURO: non responsive, intermittently opens eyes  HEENT: NGT in place  CHEST: no respiratory distress, no accessory muscle use  CARDIAC: tachycardic  ABDOMEN: soft, non-tender, non-distended, no rebound or guarding  EXTREMITIES: cool to touch, no edema  SKIN: dry    LABS: reviewed                        9.8    15.39 )-----------( 208      ( 11 Mar 2022 05:43 )             30.8     03-11    126<L>  |  93<L>  |  30<H>  ----------------------------<  101<H>  5.6<H>   |  20<L>  |  1.05    Ca    9.9      11 Mar 2022 09:11  Phos  3.9     03-  Mg     2.2     -    TPro  9.8<H>  /  Alb  3.5  /  TBili  0.4  /  DBili  x   /  AST  36  /  ALT  20  /  AlkPhos  170<H>      LIVER FUNCTIONS - ( 11 Mar 2022 05:43 )  Alb: 3.5 g/dL / Pro: 9.8 g/dL / ALK PHOS: 170 U/L / ALT: 20 U/L / AST: 36 U/L / GGT: x               Diagnostic Studies: see sunrise for full report         Chief Complaint:  Patient is a 66y old  Male who presents with a chief complaint of Septic shock (10 Mar 2022 18:08)      HPI:JOINT, RICKY is a 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 s/p HM2 LVAD in 2017 at  (due to severe PAD) with TV ring, h/o mid-SB bleeding on VCE (negative on PUSH 6/15/21), subsequently maintained off AC, CKD 3, prior COVID-19 infection in 2020, recurrent syncope post LVAD s/p ILR, s/p prolonged complex hospitalization from -2021 initially admitted at that time with abdominal pain s/p VCE/PUSH 6/15/21, respiratory failure s/p trach, multiple infections, s/p cholecystotomy tube and SMA stent 10/2021, with TEF, ultimately discharged to [facility] and then returned to [facility] for trach decannulation , readmitted in 2022 with recurrent COVID-19 infection, initially in distributive shock with serratia marcesens, with declining condition and more lethargic/less responsive with hyponatremia, hyperkalemia, bacteremia with unknown source, multiple runs of VT (3/11) with NGT in place, GI consulted for PEG evaluation.    Patient unable to communicate, not responsive, intermittently opens eyes, not following commands. History adapted per chart and primary.    PMHX/PSHX:  No pertinent past medical history    CHF (congestive heart failure)    CAD (coronary artery disease)    Depression    Pleural effusion    History of  novel coronavirus disease (COVID-19)    Hemorrhoids    Bleeding hemorrhoids    Peripheral arterial disease    Claudication    BPH with urinary obstruction    ACC/AHA stage D systolic heart failure    Anticoagulation goal of INR 2.0 to 2.5    Falls    Clavicle fracture    CKD (chronic kidney disease), stage III    Iron deficiency anemia    H/O epistaxis    Vertigo    GI bleed    No significant past surgical history    S/P TVR (tricuspid valve repair)    S/P ventricular assist device    S/P endoscopy    H/O tracheostomy      Allergies:  Amiodarone Hydrochloride (Other (Severe))      Home Medications: reviewed  Hospital Medications:  ascorbic acid 500 milliGRAM(s) Oral daily  cefepime   IVPB 1000 milliGRAM(s) IV Intermittent every 12 hours  chlorhexidine 2% Cloths 1 Application(s) Topical <User Schedule>  cholecalciferol 2000 Unit(s) Oral daily  insulin lispro (ADMELOG) corrective regimen sliding scale   SubCutaneous every 6 hours  methimazole 10 milliGRAM(s) Oral daily  mexiletine 150 milliGRAM(s) Oral every 8 hours  mirtazapine 7.5 milliGRAM(s) Oral at bedtime  multivitamin 1 Tablet(s) Oral daily  pantoprazole  Injectable 40 milliGRAM(s) IV Push daily  senna 2 Tablet(s) Oral at bedtime  sertraline 100 milliGRAM(s) Oral daily  thiamine 100 milliGRAM(s) Oral daily  traMADol 25 milliGRAM(s) Oral once      Social History:   Unable to assess due to patient condition.    Family history:    None documented  Unable to obtain due to patient's condition    ROS:   Unable to obtain due to patient's condition.    PHYSICAL EXAM:   Vital Signs:  Vital Signs Last 24 Hrs  T(C): 36.9 (11 Mar 2022 07:27), Max: 37.1 (11 Mar 2022 04:00)  T(F): 98.5 (11 Mar 2022 07:27), Max: 98.7 (11 Mar 2022 04:00)  HR: 105 (11 Mar 2022 09:00) (90 - 114)  BP: 99/70 (11 Mar 2022 07:27) (92/73 - 99/70)  BP(mean): 76 (11 Mar 2022 09:00) (72 - 88)  RR: 18 (11 Mar 2022 07:30) (18 - 22)  SpO2: 100% (11 Mar 2022 07:30) (98% - 100%)  Daily     Daily Weight in k.7 (11 Mar 2022 05:00)    GENERAL: ill appearing  NEURO: non responsive, intermittently opens eyes  HEENT: NGT in place  CHEST: no respiratory distress, no accessory muscle use  CARDIAC: tachycardic  ABDOMEN: soft, non-tender, non-distended, no rebound or guarding  EXTREMITIES: cool to touch, no edema  SKIN: dry    LABS: reviewed                        9.8    15.39 )-----------( 208      ( 11 Mar 2022 05:43 )             30.8     03-11    126<L>  |  93<L>  |  30<H>  ----------------------------<  101<H>  5.6<H>   |  20<L>  |  1.05    Ca    9.9      11 Mar 2022 09:11  Phos  3.9       Mg     2.2         TPro  9.8<H>  /  Alb  3.5  /  TBili  0.4  /  DBili  x   /  AST  36  /  ALT  20  /  AlkPhos  170<H>      LIVER FUNCTIONS - ( 11 Mar 2022 05:43 )  Alb: 3.5 g/dL / Pro: 9.8 g/dL / ALK PHOS: 170 U/L / ALT: 20 U/L / AST: 36 U/L / GGT: x               Diagnostic Studies: see sunrise for full report        < from: CT Abdomen and Pelvis w/ Oral Cont and w/ IV Cont (22 @ 20:54) >  FINDINGS:  CHEST:  LUNGS AND LARGE AIRWAYS: Patent central airways. Emphysema. Bibasilar   subsegmental atelectasis.  PLEURA: Small bilateral pleural effusions.  VESSELS: Right IJ central venous catheter with tip in proximal SVC. Right   approximately midline catheter. Atherosclerotic changes.  HEART: Cardiomegaly. No pericardial effusion. LVAD. Tricuspid valve   replacement.  MEDIASTINUM AND DON: No lymphadenopathy.  CHEST WALL AND LOWER NECK: Median sternotomy.    ABDOMEN AND PELVIS:  Evaluation of the upper abdomen is degraded due to streak artifact from   the LVAD.    LIVER: Within normal limits.  BILE DUCTS: Normal caliber.  GALLBLADDER: Decompressed with percutaneous cholecystostomy tube.  SPLEEN: A 2.6 cm hypoattenuating splenic lesion, previously 4.0 cm,   decreased.  PANCREAS: Within normal limits.  ADRENALS: Within normal limits.  KIDNEYS/URETERS: Within normal limits.    BLADDER: Landrum catheter.  REPRODUCTIVE ORGANS: Prostate is enlarged.    BOWEL: Enteric tube terminating in the stomach. No bowel obstruction.   Appendix within normal limits.  PERITONEUM: Small volume ascites.  VESSELS: Severe atherosclerotic changes. Left common iliac artery and SMA   stents are again noted. Thrombus in the abdominal aorta which narrows the   lumen at least 50% at the infrarenal segment, increased from prior exam.  RETROPERITONEUM/LYMPH NODES: No lymphadenopathy.  ABDOMINAL WALL: Anasarca.  BONES: Degenerative changes.    IMPRESSION:  Small bilateral pleural effusions and small volume ascites, new from   prior exam. No drainable fluid collection.    A 2.6 m hypoattenuating splenic lesion is decreased.    Aortic thrombus narrowing the lumen at least 50% at the infrarenal   segment, increased from the prior exam.    Findings discussed with CLAUDIA Mathews on 3/4/2022 at 9:22 AM with read back.    < end of copied text >

## 2022-03-11 NOTE — PROGRESS NOTE ADULT - PROBLEM SELECTOR PLAN 1
2/28 BCx + serratia/GNR  - IV abx per ID. currently on cefepime.  - CT C/A/P showing small bilateral pleural effusions, small volume ascites, which is new, decreased splenic lesion, aortic thrombus narrowing the lumen at least 505 at the infrarenal segment, increased from prior  - home mirtazapine and gabapentin discontinued given lethargy  - appreciate palliative care input regarding GOC and PEG placement. Discussed with medicine team arranging family meeting for next week to discuss PEG.  - will follow ID recs regarding PICC line placement

## 2022-03-11 NOTE — CONSULT NOTE ADULT - PROBLEM SELECTOR RECOMMENDATION 9
Mild Chronic asymptomatic Hyponatremia  Pt has had a history of chronic hyponatremia in the past.   Most recently his Na corrected to 135 on 3/8, now trending back down to 127.   Pt recieved multiple rounds of IVF since admission. On abx with D5. Of note, he was also on sodium chloride 1 gram q8h   Appears          on exam. Check Ludy, UOsm, SOsm, Urine K. Would fluid restrict to <1L/day for now. Increase PO solute intake. Monitor SNa daily. Check TSH and Cortisol.           If you have any questions, please feel free to contact me  Cindy Cueto  Nephrology Fellow  Pager NS: [phone]/ [facility]: [phone]    (After 5 pm or on weekends please page the on-call fellow, can check [email or web address] for schedule. Login is frances fisher, schedule under [facility] medicine, psych, derm) Mild Chronic asymptomatic Hyponatremia  Pt has had a history of chronic hyponatremia in the past.   Most recently his Na corrected to 135 on 3/8, now trending back down to 127.   Pt recieved multiple rounds of IVF since admission. On abx with D5. Of note, he was also on sodium chloride 1 gram q8h   Appears  euvolemic on exam.   Last Posm 281 & Uosm 585. No repeats sinnce then  Check Ludy, UOsm, SOsm, Urine K.   Would fluid restrict to <1L/day for now.   Increase PO solute intake.   Monitor SNa daily. Check TSH and Cortisol.   May continue with zoloft      If you have any questions, please feel free to contact me  Cindy Cueto  Nephrology Fellow  Pager NS: [phone]/ [facility]: [phone]    (After 5 pm or on weekends please page the on-call fellow, can check [email or web address] for schedule. Login is frances fisher, schedule under [facility] medicine, psych, derm) Mild Chronic asymptomatic Hyponatremia  Pt has had a history of chronic hyponatremia in the past.   Most recently his Na corrected to 135 on 3/8, now trending back down to 127.   Pt recieved multiple rounds of IVF since admission. On abx with D5. Of note, he was also on sodium chloride 1 gram q8h   Appears euvolemic on exam.   Last Posm 281 & Uosm 585. No repeats since then  Check Ludy, UOsm, SOsm, Urine K.   Would fluid restrict to <1L/day for now.   Increase PO solute intake.   Monitor SNa daily. Check TSH and Cortisol.   May continue with zoloft  Avoid hypotonic fluids & NS (unless for resuscitation) for now      If you have any questions, please feel free to contact me  Cindy Cueto  Nephrology Fellow  Pager NS: [phone]/ [facility]: [phone]    (After 5 pm or on weekends please page the on-call fellow, can check [email or web address] for schedule. Login is frances fisher, schedule under [facility] medicine, psych, derm) Mild Chronic asymptomatic Hyponatremia  Pt has had a history of chronic hyponatremia in the past.   Most recently his Na corrected to 135 on 3/8, now trending back down to 127.   Pt recieved multiple rounds of IVF since admission. On abx with D5. Of note, he was also on sodium chloride 1 gram q8h   Appears euvolemic on exam.   Last Posm 281 & Uosm 585 in december 2021. No repeats since then  repeat  Ludy, UOsm, SOsm, Urine K.   Would fluid restrict to <1L/day for now.   Increase PO solute intake.   Monitor SNa daily. Check TSH and Cortisol.   May continue with zoloft  Avoid hypotonic fluids & NS (unless for resuscitation) for now      If you have any questions, please feel free to contact me  Cinyd Cueto  Nephrology Fellow  Pager NS: [phone]/ [facility]: [phone]    (After 5 pm or on weekends please page the on-call fellow, can check [email or web address] for schedule. Login is frances fisher, schedule under [facility] medicine, psych, derm)

## 2022-03-11 NOTE — PROGRESS NOTE ADULT - PROBLEM SELECTOR PLAN 1
suspect secondary to infection with diffuse cerebral dysfunction.   CT head x 2 negative for acute infarct  unable to do MRI d/t LVAD  EEG negative for seizure activity  neuro recs noted  ammonia low, B1 pending  home gabapentin discontinued given lethargy  GOC discussion re: end of life care and feeding tube ongoing

## 2022-03-11 NOTE — PROGRESS NOTE ADULT - SUBJECTIVE AND OBJECTIVE BOX
Patient is a 66y old  Male who presents with a chief complaint of Septic shock (11 Mar 2022 10:55)      INTERVAL History of Present Illness/OVERNIGHT EVENTS: episodes of v.tach.  heart failure service notified; recommendations noted  keep K>4, Mg>2  labs hemolyzed earlier; redrawn    MEDICATIONS  (STANDING):  ascorbic acid 500 milliGRAM(s) Oral daily  cefepime   IVPB 1000 milliGRAM(s) IV Intermittent every 12 hours  chlorhexidine 2% Cloths 1 Application(s) Topical <User Schedule>  cholecalciferol 2000 Unit(s) Oral daily  insulin lispro (ADMELOG) corrective regimen sliding scale   SubCutaneous every 6 hours  methimazole 10 milliGRAM(s) Oral daily  mexiletine 150 milliGRAM(s) Oral every 8 hours  mirtazapine 7.5 milliGRAM(s) Oral at bedtime  multivitamin 1 Tablet(s) Oral daily  pantoprazole  Injectable 40 milliGRAM(s) IV Push daily  senna 2 Tablet(s) Oral at bedtime  sertraline 100 milliGRAM(s) Oral daily  sodium zirconium cyclosilicate 10 Gram(s) Oral daily  thiamine 100 milliGRAM(s) Oral daily  traMADol 25 milliGRAM(s) Oral once    MEDICATIONS  (PRN):      Allergies    Amiodarone Hydrochloride (Other (Severe))    Intolerances        REVIEW OF SYSTEMS:  Negative unless otherwise specified above.    Vital Signs Last 24 Hrs  T(C): 36 (11 Mar 2022 11:31), Max: 37.1 (11 Mar 2022 04:00)  T(F): 96.8 (11 Mar 2022 11:31), Max: 98.7 (11 Mar 2022 04:00)  HR: 88 (11 Mar 2022 11:31) (88 - 114)  BP: 86/68 (11 Mar 2022 11:31) (86/68 - 99/70)  BP(mean): 73 (11 Mar 2022 11:31) (72 - 88)  RR: 18 (11 Mar 2022 11:31) (18 - 22)  SpO2: 99% (11 Mar 2022 11:31) (98% - 100%)        PHYSICAL EXAM:  GENERAL: No apparent distress, appears chronically ill  HEAD:  Atraumatic, Normocephalic  EYES: Conjunctiva and sclera clear, no discharge  ENMT: NGT, no discharge  NECK: Supple, +tracheostomy site dressing  CHEST/LUNG: Air entry bilaterally, no wheeze or rales  HEART: Regular rhythm  ABDOMEN: Soft, Nontender, +PC drain, RLQ driveline  EXTREMITIES:  No clubbing, cyanosis or edema  NERVOUS SYSTEM: Opens eyes, does not follow commands       LABS:                        8.8    14.43 )-----------( 203      ( 11 Mar 2022 11:26 )             27.3     11 Mar 2022 11:26    127    |  92     |  32     ----------------------------<  128    4.9     |  23     |  1.12     Ca    9.4        11 Mar 2022 11:26  Phos  3.9       11 Mar 2022 05:43  Mg     2.0       11 Mar 2022 11:26    TPro  9.8    /  Alb  3.5    /  TBili  0.4    /  DBili  x      /  AST  36     /  ALT  20     /  AlkPhos  170    11 Mar 2022 05:43        CAPILLARY BLOOD GLUCOSE      POCT Blood Glucose.: 131 mg/dL (11 Mar 2022 11:21)  POCT Blood Glucose.: 134 mg/dL (11 Mar 2022 07:07)  POCT Blood Glucose.: 132 mg/dL (11 Mar 2022 05:02)  POCT Blood Glucose.: 103 mg/dL (10 Mar 2022 23:36)  POCT Blood Glucose.: 149 mg/dL (10 Mar 2022 17:30)      RADIOLOGY & ADDITIONAL TESTS:      Images reviewed personally    Consultant Notes Reviewed and Care Discussed with relevant Consultants.

## 2022-03-11 NOTE — PROGRESS NOTE ADULT - SUBJECTIVE AND OBJECTIVE BOX
RICKY JOINT  MRN#: [id number]  Subjective: pulmonary progress note  : acute hypercapnic respiratory failure . Septic shock , service rendered on 2002   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 s/p HM2 LVAD in 2017 at  (due to severe PAD) with TV ring, multiple GI bleeds, thus maintained off AC, CKD 3prior COVID-19 infection in 2020, recurrent syncope post LVAD s/p ILR, s/p prolonged complex hospitalization from -2021 initially admitted with abdominal pain complicated by GIB, respiratory failure s/p trach, multiple infections, s/p cholecystotomy tube and SMA stent 10/2021, ultimately discharged to [facility] rehab and then returned to [facility] for trach decannulation , readmitted in 2022 with recurrent COVID-19 infection, initially in distributive shock. Blood cultures were also positive for serratia marcescens which he has had in the past. seen by heart failure team patient is DNR/DNI  continue on 3 liter nasal canula flat Affect , hyperkalemia lokelma , runs of V-Tach.         (2022 22:20)    PAST MEDICAL & SURGICAL HISTORY:  CHF (congestive heart failure)    CAD (coronary artery disease)  Depression    Pleural effusion    History of 2019 novel coronavirus disease (COVID-19)  2020    Hemorrhoids    Bleeding hemorrhoids    Peripheral arterial disease    Claudication    BPH with urinary obstruction    ACC/AHA stage D systolic heart failure    Anticoagulation goal of INR 2.0 to 2.5    Falls    Clavicle fracture    CKD (chronic kidney disease), stage III    Iron deficiency anemia    H/O epistaxis    Vertigo    GI bleed    S/P TVR (tricuspid valve repair)    S/P ventricular assist device    S/P endoscopy    H/O tracheostomy        FAMILY HISTORY:    Social History:    Marital Status:  X    (   ) Single    (   )    (  )   Occupation: Retired   Lives with: (  ) alone  (  ) children  X spouse   (  ) parents  (  ) other    Substance Use (street drugs): X never used  (  ) other:  Tobacco Usage: X never smoked   (   ) former smoker   (   ) current smoker  (     ) pack year  (    ) last cigarette date  Alcohol Usage: Social         OBJECTIVE:  ICU Vital Signs Last 24 Hrs  T(C): 37.2 (01 Mar 2022 16:00), Max: 38.4 (01 Mar 2022 09:00)  T(F): 99 (01 Mar 2022 16:00), Max: 101.1 (01 Mar 2022 09:00)  HR: 109 (01 Mar 2022 18:00) (67 - 124)  BP: --  BP(mean): 86 (2022 20:00) (86 - 86)  ABP: 89/74 (01 Mar 2022 18:00) (75/63 - 160/85)  ABP(mean): 81 (01 Mar 2022 18:00) (68 - 139)  RR: 38 (01 Mar 2022 18:00) (5 - 40)  SpO2: 99% (01 Mar 2022 18:00) (94% - 100%)       @ 07: @ 07:00  --------------------------------------------------------  IN: 1060.7 mL / OUT: 1600 mL / NET: -539.3 mL     @ 07: @ 18:22  --------------------------------------------------------  IN: 677.8 mL / OUT: 455 mL / NET: 222.8 mL    PHYSICAL EXAM :Daily Height in cm: 177.8 (2022 21:20)  Elderly frail male on 3 liter nasal cannula    Daily Weight in k.7 (2022 21:20)  HEENT:     + NCAT  + EOMI  - Conjuctival edema   - Icterus   - Thrush   - ETT  - NGT/OGT  Neck:         + FROM RT IJ  JVD     - Nodes     - Masses    + Mid-line trachea   - Tracheostomy  Chest:           mild kyphosis   Lungs:          + CTA  + Rhonchi  + Rales    - Wheezing  + Decreased BS   - Dullness R L  Cardiac:       + S1 + S2    + RRR   - Irregular   - S3  - S4    - Murmurs   - Rub   - Hamman’s sign   Abdomen:    + BS     + Soft    + Non-tender  - Distended  - Organomegaly  - PEG Cholecystostomy tube in place   Extremities:   - Cyanosis U/L   - Clubbing  U/L  - LE/UE Edema   + Capillary refill    + Pulses   Neuro:        + Awake   +  Alert   - Confused   - Lethargic   - Sedated   - Generalized Weakness  Skin:        - Rashes    - Erythema   + Normal incisions   + IV sites intact  -      HOSPITAL MEDICATIONS: All mediciations reviewed and analyzed  MEDICATIONS  (STANDING):  albumin human  5% IVPB 250 milliLiter(s) IV Intermittent once  cefepime   IVPB 2000 milliGRAM(s) IV Intermittent every 12 hours  chlorhexidine 2% Cloths 1 Application(s) Topical <User Schedule>  cholecalciferol 1000 Unit(s) Oral daily  dextrose 40% Gel 15 Gram(s) Oral once  dextrose 50% Injectable 25 Gram(s) IV Push once  gabapentin Solution 100 milliGRAM(s) Oral three times a day  glucagon  Injectable 1 milliGRAM(s) IntraMuscular once  insulin lispro (ADMELOG) corrective regimen sliding scale   SubCutaneous every 6 hours  magnesium sulfate  IVPB 1 Gram(s) IV Intermittent once  methimazole 10 milliGRAM(s) Oral daily  metoclopramide 10 milliGRAM(s) Oral four times a day  mexiletine 150 milliGRAM(s) Oral every 8 hours  mirtazapine 7.5 milliGRAM(s) Oral at bedtime  multivitamin 1 Tablet(s) Oral daily  pantoprazole  Injectable 40 milliGRAM(s) IV Push daily  potassium phosphate IVPB 15 milliMole(s) IV Intermittent once  senna 2 Tablet(s) Oral at bedtime  sertraline 100 milliGRAM(s) Oral daily  vancomycin  IVPB 750 milliGRAM(s) IV Intermittent every 24 hours  vasopressin Infusion 0.02 Unit(s)/Min (1.2 mL/Hr) IV Continuous <Continuous>    MEDICATIONS  (PRN):    LABS: All Lab data reviewed and analyzed                        8.8    14.43 )-----------(       ( 11 Mar 2022 11:26 )    03-11    127<L>  |  92<L>  |  32<H>  ----------------------------<  128<H>  4.9   |  23  |  1.12    Ca    9.4      11 Mar 2022 11:26  Phos  3.9     03-11  Mg     2.0     -    TPro  9.8<H>  /  Alb  3.5  /  TBili  0.4  /  DBili  x   /  AST  36  /  ALT  20  /  AlkPhos  170<H>      Ca    9.3      10 Mar 2022 06:26  Phos  3.0     03-10  Mg     1.6     -10    TPro  8.3  /  Alb  3.0<L>  /  TBili  0.4  /  DBili  x   /  AST  35  /  ALT  23  /  AlkPhos  166<H>      Ca    8.9      08 Mar 2022 04:59  Phos  3.1     03-08  Mg     1.9     -    TPro  7.8  /  Alb  2.9<L>  /  TBili  0.3  /  DBili  x   /  AST  40  /  ALT  24  /  AlkPhos  156<H>                 PTT - ( 01 Mar 2022 00:08 )  PTT:25.0 sec LIVER FUNCTIONS - ( 01 Mar 2022 17:38 )  Alb: 2.9 g/dL / Pro: 7.3 g/dL / ALK PHOS: 116 U/L / ALT: 30 U/L / AST: 29 U/L / GGT: x           RADIOLOGY: - Reviewed and analyzed

## 2022-03-11 NOTE — CONSULT NOTE ADULT - ASSESSMENT
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 s/p HM2 LVAD in 9/2017 at  (due to severe PAD) with TV ring, multiple GI bleeds, thus maintained off AC, prior COVID-19 infection in 4/2020, recurrent syncope post LVAD s/p ILR, prolonged complex hospitalization from 6/14-11/16/2021 with GIB, respiratory failure s/p trach decanulated 12/2021, multiple infections, s/p cholecystotomy tube and SMA stent 10/2021, recurrent COVID 19 reinfection s/p MAB/remdesivir/steroids and distributive shock with serratia bacteremia, recurrent VT on mexiletine transferred from [facility] ED with metabolic encephalopathy, hypercarbic respiratory failure weaned off bipap, septic shock requiring pressors (now on midodrine) with serratia bacteremia on cefepime,  episodes of recurrent VT s/p lidocaine gtt now back on mexiletine. Nephrology called for hyponatremia.

## 2022-03-11 NOTE — PROGRESS NOTE ADULT - PROBLEM SELECTOR PLAN 5
See my GOC note dated 3/3 as well as today's GOC note. However, in summary, GOC are for trying to manage his infection and to continue to do all interventions in order to try to prolong the patient's life and to improve the patient's condition including a PEG if possible; however, his family agreed with DNR/I. Plan if for a f/u FM on Thursday 3/17 or before if the patient's condition worsens.

## 2022-03-11 NOTE — PROGRESS NOTE ADULT - SUBJECTIVE AND OBJECTIVE BOX
HPI:  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 s/p HM2 LVAD in 9/2017 at  (due to severe PAD) with TV ring, multiple GI bleeds, thus maintained off AC, CKD 3prior COVID-19 infection in 4/2020, recurrent syncope post LVAD s/p ILR, s/p prolonged complex hospitalization from 6/14-11/16/2021 initially admitted with abdominal pain complicated by GIB, respiratory failure s/p trach, multiple infections, s/p cholecystotomy tube and SMA stent 10/2021, ultimately discharged to [facility] rehab and then returned to [facility] for trach decannulation 12/2, readmitted in 2/2022 with recurrent COVID-19 infection, initially in distributive shock. Blood cultures were also positive for serratia marcescens which he has had in the past.     Pt was transferred to [facility] CICU from [facility]. He was sent to their ED from a rehab center due to AMS and tachycardia. At [facility], they noted a WBC greater than 20, tachycardia and hypercarbia on ABG. Pt was placed on BiPAP and transferred to [facility] CICU for concern for sepsis vs septic shock.      (27 Feb 2022 22:20)    Collateral info was gathered from the patient's sister (HCP) that indicated, in the [place], the patient also had a fall. However, CT Head dated 2/28 is negative for acute events.     3/1 The patient was lethargic, non verbal, and not able to f/u commands. He was having mild respiratory distress.   3/3 Though more alert, he is still lethargic, not able to f/u commands, and non verbal. He is still on pressors and with a mild degree of respiratory distress.   3/9 The patient was more alert but still lethargic and not really able to express and understand about his medical issues or the risks and benefits of his Rxs. He denied pain or dyspnea. He appeared to be depressed. He is extremely weak and debilitated.    3/10 The patient was deeply lethargic and barely able to open his eyes. He was not following up any commands. He did not appear to be on any acute distress.   3/11 Overnight with respiratory distress and episodes of Vtach. When evaluated he was more alert but still not able to follow up commands or to communicate.     PERTINENT PM/SXH:   No pertinent past medical history    CHF (congestive heart failure)    CAD (coronary artery disease)    Depression    Pleural effusion    History of 2019 novel coronavirus disease (COVID-19)    Hemorrhoids    Bleeding hemorrhoids    Peripheral arterial disease    Claudication    BPH with urinary obstruction    ACC/AHA stage D systolic heart failure    Anticoagulation goal of INR 2.0 to 2.5    Falls    Clavicle fracture    CKD (chronic kidney disease), stage III    Iron deficiency anemia    H/O epistaxis    Vertigo    GI bleed      No significant past surgical history    S/P TVR (tricuspid valve repair)    S/P ventricular assist device    S/P endoscopy    H/O tracheostomy      FAMILY HISTORY:    ITEMS NOT CHECKED ARE NOT PRESENT    SOCIAL HISTORY:   Significant other/partner[ ]  Children[x ]  Church/Spirituality:  Substance hx:  [ ]   Tobacco hx:  [ ]   Alcohol hx: [ ]   Home Opioid hx:  [ ] I-Stop Reference No:  Living Situation: [ ]Home  [ ]Long term care  [ ]Rehab [ ]Other    ADVANCE DIRECTIVES:    DNR  MOLST  [ ]  Living Will  [ ]   DECISION MAKER(s):  [x ] Health Care Proxy(s)  [ ] Surrogate(s)  [ ] Guardian           Name(s): Phone Number(s): Cristina Rudolph [phone]    BASELINE (I)ADL(s) (prior to admission):  [place]: [ ]Total  [ ] Moderate [ x]Dependent    Allergies    Amiodarone Hydrochloride (Other (Severe))    Intolerances    MEDICATIONS  (STANDING):  ascorbic acid 500 milliGRAM(s) Oral daily  cefepime   IVPB 1000 milliGRAM(s) IV Intermittent every 12 hours  chlorhexidine 2% Cloths 1 Application(s) Topical <User Schedule>  cholecalciferol 2000 Unit(s) Oral daily  insulin lispro (ADMELOG) corrective regimen sliding scale   SubCutaneous every 6 hours  methimazole 10 milliGRAM(s) Oral daily  mexiletine 150 milliGRAM(s) Oral every 8 hours  mirtazapine 7.5 milliGRAM(s) Oral at bedtime  multivitamin 1 Tablet(s) Oral daily  pantoprazole  Injectable 40 milliGRAM(s) IV Push daily  senna 2 Tablet(s) Oral at bedtime  sertraline 100 milliGRAM(s) Oral daily  sodium zirconium cyclosilicate 10 Gram(s) Oral daily  thiamine 100 milliGRAM(s) Oral daily  traMADol 25 milliGRAM(s) Oral once    MEDICATIONS  (PRN):        PRESENT SYMPTOMS: [x ]Unable to obtain due to poor mentation   Source if other than patient:  [ ]Family   [ ]Team     Pain: [ ]yes [x ]no  QOL impact -   Location -                    Aggravating factors -  Quality -  Radiation -  Timing-  Severity (0-10 scale):  Minimal acceptable level (0-10 scale):     CPOT:    https://www.[facility].org/getattachment/ckx45g06-2h1h-6q2b-6q4a-8286z7543y6e/Critical-Care-Pain-Observation-Tool-(CPOT)      PAIN AD Score: 0    http://geriatrictoolkit.[facility]/cog/painad.pdf (press ctrl +  left click to view)  RDOS scale (https://pubmed.ncbi.nlm.nih.gov/66361891/, https://www.[employer]/doi/10.1089/jpm.2009.0229?url_ver=Z39.&rfr_id=mala:rid:crossref.org&rfr_dat=cr_pub%20%200pubmed)    A score of 0 to 2 signifies little or no respiratory distress, 3 signifies mild distress, scores 4 to 6 indicate moderate distress, and scores greater than 7 signify severe distress  RDOS score: 0    Dyspnea:                           [ ]Mild [ ]Moderate [ ]Severe  Anxiety:                             [ ]Mild [ ]Moderate [ ]Severe  Fatigue:                             [ ]Mild [ ]Moderate [ ]Severe  Nausea:                             [ ]Mild [ ]Moderate [ ]Severe  Loss of appetite:              [ ]Mild [ ]Moderate [ ]Severe  Constipation:                    [ ]Mild [ ]Moderate [ ]Severe    Other Symptoms:  [ ]All other review of systems negative     Palliative Performance Status Version 2: 20        %    http://npcrc.org/files/news/palliative_performance_scale_ppsv2.pdf  PHYSICAL EXAM:  Vital Signs Last 24 Hrs  T(C): 37.1 (11 Mar 2022 17:49), Max: 37.1 (11 Mar 2022 04:00)  T(F): 98.8 (11 Mar 2022 17:49), Max: 98.8 (11 Mar 2022 17:49)  HR: 89 (11 Mar 2022 17:49) (88 - 114)  BP: 86/68 (11 Mar 2022 11:31) (86/68 - 99/70)  BP(mean): 70 (11 Mar 2022 17:40) (70 - 88)  RR: 18 (11 Mar 2022 17:49) (16 - 22)  SpO2: 100% (11 Mar 2022 17:49) (98% - 100%)    GENERAL:  [ ]Alert  [ ]Oriented x   [x ]Lethargic  [ ]Cachexia  [ ]Unarousable  [ ]Verbal  [x ]Non-Verbal  Behavioral:   [ ] Anxiety  [ ] Delirium [ ] Agitation [x ] Other: Encephalopathic   HEENT:  [ ]Normal   [ x]Dry mouth   [ ]ET Tube/Trach  [ ]Oral lesions  PULMONARY:   [ ]Clear [ ]Tachypnea  [ ]Audible excessive secretions   [ ]Rhonchi        [ ]Right [ ]Left [ ]Bilateral  [ ]Crackles        [ ]Right [ ]Left [ ]Bilateral  [ ]Wheezing     [ ]Right [ ]Left [ ]Bilateral  [x ]Diminished breath sounds [ ]right [ ]left [x ]bilateral  CARDIOVASCULAR:    [x ]Regular [ ]Irregular [ ]Tachy  [ ]Jose [ ]Murmur [ ]Other  [x] LVAD hum  GASTROINTESTINAL:  [ ]Soft  [ ]Distended   [ ]+BS  [ ]Non tender [ ]Tender  [ ]PEG [x ]OGT/ NGT  Last BM: 3/9  GENITOURINARY:  [ ]Normal [x ] Incontinent   [ ]Oliguria/Anuria   [ ][place]  MUSCULOSKELETAL:   [ ]Normal   [x ]Weakness  [ ]Bed/Wheelchair bound [ ]Edema  NEUROLOGIC:   [ ]No focal deficits  [ x]Cognitive impairment  [ ]Dysphagia [ ]Dysarthria [ ]Paresis [ ]Other   SKIN:   [ ]Normal    [ ]Rash  [ ]Pressure ulcer(s)       Present on admission [ ]y [ ]n  [x] BL Heel and R Buttock DTI   CRITICAL CARE:  [ ] Shock Present  [ ]Septic [ ]Cardiogenic [ ]Neurologic [ ]Hypovolemic  [ ]  Vasopressors [ ]  Inotropes   [ ]Respiratory failure present [ ]Mechanical ventilation [ ]Non-invasive ventilatory support [ ]High flow    [ ]Acute  [ ]Chronic [ ]Hypoxic  [ ]Hypercarbic [ ]Other  [ ]Other organ failure     LABS:                                               8.8    14.43 )-----------( 203      ( 11 Mar 2022 11:26 )             27.3   03-11    127<L>  |  92<L>  |  32<H>  ----------------------------<  128<H>  4.9   |  23  |  1.12    Ca    9.4      11 Mar 2022 11:26  Phos  3.9     03-11  Mg     2.0     03-11    TPro  9.8<H>  /  Alb  3.5  /  TBili  0.4  /  DBili  x   /  AST  36  /  ALT  20  /  AlkPhos  170<H>  03-11          RADIOLOGY & ADDITIONAL STUDIES:  < from: Xray Chest 1 View- PORTABLE-Urgent (Xray Chest 1 View- PORTABLE-Urgent .) (03.11.22 @ 10:07) >  IMPRESSION:  Left basilar passive atelectasis.    --- End of Report ---          ANITHA NORMAN MD; Resident Radiologist  This document has been electronically signed.  KOBI MAYEN MD; Attending Radiologist  This document has been electronically signed. Mar 11 2022  3:09PM    < end of copied text >    PROTEIN CALORIE MALNUTRITION PRESENT: [ ]mild [ ]moderate [ ]severe [ ]underweight [ ]morbid obesity  https://www.andeal.org/vault/2440/web/files/ONC/Table_Clinical%20Characteristics%20to%20Document%20Malnutrition-White%20JV%20et%20al%214690.pdf    Height (cm): 177.8 (02-27-22 @ 21:20), 182.9 (12-13-21 @ 04:30), 165.1 (12-02-21 @ 13:55)  Weight (kg): 48.7 (02-27-22 @ 21:20), 52.6 (01-12-22 @ 12:50), 53.6 (12-02-21 @ 13:55)  BMI (kg/m2): 15.4 (02-27-22 @ 21:20), 15.7 (01-12-22 @ 12:50), 16 (12-13-21 @ 04:30)    [ ]PPSV2 < or = to 30% [ ]significant weight loss  [ ]poor nutritional intake  [ ]anasarca      [ ]Artificial Nutrition      REFERRALS:   [ ]Chaplaincy  [ ]Hospice  [ ]Child Life  [ ]Social Work  [ ]Case management [ ]Holistic Therapy     Goals of Care Document:

## 2022-03-11 NOTE — CHART NOTE - NSCHARTNOTEFT_GEN_A_CORE
Notified by RN that pt was had multiple runs of 4-5 beats of WCT on tele lasting up to 20 seconds.   Pt with similar previous events on tele, metoprolol was added yesterday.   Of note, pt also with hyperkalemia previously received lokelma. K this am was 6.1  Spoke to cardiology.    Plan  - continue with metoprolol    - lokelma  - 10 units insulin IV with 50% dextrose  - continue to monitor on tele  - repeat BMP  - monitor blood sugars closely  - will follow up with AM team     Christine Mckeon PA-C  Department of Medicine

## 2022-03-11 NOTE — PROGRESS NOTE ADULT - ATTENDING COMMENTS
67 y/o M with HM3 LVAD, well known to our service. Admitted with sepsis and AMS. Found to have recurrent bacteremia, now on appropriate antibiotics. Remains non-verbal but squeezing hands to command and nodding head.  For now, continue treatment of bacteremia. Source of bacteremia is LVAD. Appreciate ID involvement.  Has been having runs of NSVT. Increase Toprol XL to 50mg daily. If needed, may need to increase dose of Mexiletine.  Dietician follow for optimization of TF given electrolyte disturbances. Appreciate assistance.   Monitor electrolytes. Agree with Ghazal for hyperkalemia.  Ongoing discussions with Palliative Care and family with respect to goals of care. Currently being fed through NG tube, may need PEG placement for nutrition.    Prognosis guarded.

## 2022-03-11 NOTE — GOALS OF CARE CONVERSATION - ADVANCED CARE PLANNING - CONVERSATION DETAILS
full note to f/u.   In summary I spoke with GI that indicated before a PEG is considered, the patient's lytes need to be corrected, he should not be having active life threatening arrhythmias (e.g., Vtach), his VS should be stable, and Cardio and ID need to clear him so they can re-eval and then assess the patient's state to define the safety of PEG placement. I informed the patient's sister about it. I indicated that, at this point, PEG was not possible and that in the future, it was unlikely. She recognized the patient is too sick and that she is doubtful it will ever be an option. Still, she wants to wait and see. We agreed on f/u on 2/17 or before if the patient's situation is getting worse. Otherwise, if on 2/17 his condition is not stable for PEG, we will have to discuss about the future of NGT feeds and overall re-defining GOC. Will try to make this f/u meeting in person.     d/w Dr. Vu.         Francisco Burgos MD   Geriatrics and Palliative Care (GAP) Consult Service    of Geriatric and Palliative Medicine  [facility]      Please page the following number for clinical matters between the hours of 9 am and 5 pm from Monday through Friday : [phone]    After 5pm and on weekends, please see the contact information below:    In the event of newly developing, evolving, or worsening symptoms, please contact the Palliative Medicine team via pager (if the patient is at [facility] #[phone] or if the patient is at [facility] #[phone]) The Geriatric and Palliative Medicine service has coverage 24 hours a day/ 7 days a week to provide medical recommendations regarding symptom management needs via telephone The patient was seen and though more alert, he is still not able to communicate and not able to f/u commands. Hence, he is still not having decision making capacity.     I spoke with GI that indicated before a PEG is considered, the patient's lytes need to be corrected, he should not be having active life threatening arrhythmias (e.g., Vtach), his VS should be stable, and Cardio and ID need to clear him so GI can re-eval and then assess the patient's state to define the safety, risks vs benefit of PEG placement. I informed the patient's sister about it. I indicated that, at this point, PEG was not possible and that in the future, it was unlikely. She recognized the patient is too sick and that she is doubtful it will ever be an option. Still, she wants to wait and see. We agreed on f/u on 2/17 or before if the patient's situation is getting worse. Otherwise, if on 2/17 his condition is not stable for PEG, we will have to discuss about the future of NGT feeds and overall re-defining GOC. Will try to make this f/u meeting in person.     d/w Dr. Vu.         Francisco Burgos MD   Geriatrics and Palliative Care (GAP) Consult Service    of Geriatric and Palliative Medicine  [facility]      Please page the following number for clinical matters between the hours of 9 am and 5 pm from Monday through Friday : [phone]    After 5pm and on weekends, please see the contact information below:    In the event of newly developing, evolving, or worsening symptoms, please contact the Palliative Medicine team via pager (if the patient is at [facility] #[phone] or if the patient is at [facility] #[phone]) The Geriatric and Palliative Medicine service has coverage 24 hours a day/ 7 days a week to provide medical recommendations regarding symptom management needs via telephone

## 2022-03-11 NOTE — PROGRESS NOTE ADULT - SUBJECTIVE AND OBJECTIVE BOX
Subjective:  - having VT on tele. no alarms on LVAD. given an additional 25mg Toprol XL  - treated for hyperkalemia with improvement to 4.9   - not verbalizing    Medications:  ascorbic acid 500 milliGRAM(s) Oral daily  cefepime   IVPB 1000 milliGRAM(s) IV Intermittent every 12 hours  chlorhexidine 2% Cloths 1 Application(s) Topical <User Schedule>  cholecalciferol 2000 Unit(s) Oral daily  insulin lispro (ADMELOG) corrective regimen sliding scale   SubCutaneous every 6 hours  methimazole 10 milliGRAM(s) Oral daily  mexiletine 150 milliGRAM(s) Oral every 8 hours  mirtazapine 7.5 milliGRAM(s) Oral at bedtime  multivitamin 1 Tablet(s) Oral daily  pantoprazole  Injectable 40 milliGRAM(s) IV Push daily  senna 2 Tablet(s) Oral at bedtime  sertraline 100 milliGRAM(s) Oral daily  thiamine 100 milliGRAM(s) Oral daily  traMADol 25 milliGRAM(s) Oral once      Physical Exam:    Vitals:  Vital Signs Last 24 Hours  T(C): 36 (22 @ 11:31), Max: 37.1 (22 @ 04:00)  HR: 88 (22 @ 11:31) (88 - 114)  BP: 86/68 (22 @ 11:31) (86/68 - 99/70)  RR: 18 (22 @ 11:31) (18 - 22)  SpO2: 99% (22 @ 11:31) (98% - 100%)    LVAD Interrogation:   Heart Mate 2  Speed 9200  Flow 5.3  PI 6.1  Power 5.7  No Events  No changes made    Weight in k.7 ( @ 05:00)    I&O's Summary    10 Mar 2022 07:01  -  11 Mar 2022 07:00  --------------------------------------------------------  IN: 1325 mL / OUT: 1380 mL / NET: -55 mL    Tele: SR, VT/NSVT    General: No distress. Comfortable.  HEENT: EOM intact. gauze over former trach site  Neck: Neck supple. JVP not elevated. No masses  Chest: Clear to auscultation bilaterally  CV: LVAD hum. radial pulses not palpable  Abdomen: Soft, non-distended, non-tender, perc dawn tube in place with small amount of bilious drainage. insertion site with dressing C/D/I  Skin: DLES dressing C/D/I, no erythema or draiange  Neurology: Alert not verbalizing or following commands. Sensation intact  Psych: Affect flat    Labs:                        8.8    14.43 )-----------( 203      ( 11 Mar 2022 11:26 )             27.3         127<L>  |  92<L>  |  32<H>  ----------------------------<  128<H>  4.9   |  23  |  1.12    Ca    9.4      11 Mar 2022 11:26  Phos  3.9       Mg     2.0         TPro  9.8<H>  /  Alb  3.5  /  TBili  0.4  /  DBili  x   /  AST  36  /  ALT  20  /  AlkPhos  170<H>      Serum Pro-Brain Natriuretic Peptide: 10524 pg/mL ( @ 04:57)  Serum Pro-Brain Natriuretic Peptide: 68764 pg/mL ( @ 05:00)  Serum Pro-Brain Natriuretic Peptide: 55142 pg/mL ( @ 04:34)    Lactate Dehydrogenase, Serum: 274 U/L ( @ 04:57)    Lactate, Blood: 1.1 mmol/L ( @ 04:57)

## 2022-03-11 NOTE — PROGRESS NOTE ADULT - ASSESSMENT
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 s/p HM2 LVAD in 9/2017 at  (due to severe PAD) with TV ring, multiple GI bleeds, thus maintained off AC, prior COVID-19 infection in 4/2020, recurrent syncope post LVAD s/p ILR, prolonged complex hospitalization from 6/14-11/16/2021 with GIB, respiratory failure s/p trach decanulated 12/2021, multiple infections, s/p cholecystotomy tube and SMA stent 10/2021, recurrent COVID 19 reinfection s/p MAB/remdesivir/steroids and distributive shock with serratia bacteremia, recurrent VT on mexiletine transferred from [facility] ED with metabolic encephalopathy, hypercarbic respiratory failure weaned off bipap, septic shock requiring pressors (now on midodrine) with serratia bacteremia on cefepime, intermittent h/h drop s/p total 3units prbc (last 3/6), episodes of recurrent VT s/p lidocaine gtt now back on mexiletine.

## 2022-03-11 NOTE — PROGRESS NOTE ADULT - PROBLEM SELECTOR PLAN 5
- history of thyrotoxicosis with amiodarone, on methimazole  - continue mexiletine (can increase dose if needed) and will increase beta blocker as above  - maintain K 4-4.5 and Mg 2-2.5  - EP following

## 2022-03-11 NOTE — PROVIDER CONTACT NOTE (CRITICAL VALUE NOTIFICATION) - ACTION/TREATMENT ORDERED:
Reviewed labs from 3/9/2022 with Dr Simone Conway, per him okay to treat today  Per Dr Simone Conway Urinalysis is not required to be obtained and should be removed from treatment plan  He would also like the patient to have MRI the brain W WO contrast due to receiving Avastin  CLAUDIA Benitez made aware, no interventions ordered at this time.

## 2022-03-11 NOTE — CONSULT NOTE ADULT - SUBJECTIVE AND OBJECTIVE BOX
[facility] DIVISION OF KIDNEY DISEASES AND HYPERTENSION -- [phone]  -- INITIAL CONSULT NOTE  --------------------------------------------------------------------------------  HPI: 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 s/p HM2 LVAD in 9/2017 at  (due to severe PAD) with TV ring, multiple GI bleeds, thus maintained off AC, prior COVID-19 infection in 4/2020, recurrent syncope post LVAD s/p ILR, prolonged complex hospitalization from 6/14-11/16/2021 with GIB, respiratory failure s/p trach decanulated 12/2021, multiple infections, s/p cholecystotomy tube and SMA stent 10/2021, recurrent COVID 19 reinfection s/p MAB/remdesivir/steroids and distributive shock with serratia bacteremia, recurrent VT on mexiletine transferred from [facility] ED with metabolic encephalopathy, hypercarbic respiratory failure weaned off bipap, septic shock requiring pressors (now on midodrine) with serratia bacteremia on cefepime,  episodes of recurrent VT s/p lidocaine gtt now back on mexiletine.  Nephrology called for hyponatremia. Pt has had a history of chronic hyponatremia int he past. Most recently his Na corrected to 135 on 3/8, now trending back down to 127. Pt recieved multiple rounds of IVF since admission. On Cefepime with D5. Of note, he was also on sodium chloride 1 gram q8h. Currently on methimazole & zoloft              PAST HISTORY  --------------------------------------------------------------------------------  PAST MEDICAL & SURGICAL HISTORY:  CHF (congestive heart failure)    CAD (coronary artery disease)    Depression    Pleural effusion    History of 2019 novel coronavirus disease (COVID-19)  april 2020  Patient seen & examined. Denies chest pain, fever, chills, increased frequency, dysuria, hematuria, pus in urine, frothy urine, SOB, leg edema, loss of appetite, pruritis, N/V.    Hemorrhoids    Bleeding hemorrhoids    Peripheral arterial disease    Claudication    BPH with urinary obstruction    ACC/AHA stage D systolic heart failure    Anticoagulation goal of INR 2.0 to 2.5    Falls    Clavicle fracture    CKD (chronic kidney disease), stage III    Iron deficiency anemia    H/O epistaxis    Vertigo    GI bleed    S/P TVR (tricuspid valve repair)    S/P ventricular assist device    S/P endoscopy    H/O tracheostomy      FAMILY HISTORY:    PAST SOCIAL HISTORY:    ALLERGIES & MEDICATIONS  --------------------------------------------------------------------------------  Allergies    Amiodarone Hydrochloride (Other (Severe))    Intolerances      Standing Inpatient Medications  ascorbic acid 500 milliGRAM(s) Oral daily  cefepime   IVPB 1000 milliGRAM(s) IV Intermittent every 12 hours  chlorhexidine 2% Cloths 1 Application(s) Topical <User Schedule>  cholecalciferol 2000 Unit(s) Oral daily  insulin lispro (ADMELOG) corrective regimen sliding scale   SubCutaneous every 6 hours  methimazole 10 milliGRAM(s) Oral daily  mexiletine 150 milliGRAM(s) Oral every 8 hours  mirtazapine 7.5 milliGRAM(s) Oral at bedtime  multivitamin 1 Tablet(s) Oral daily  pantoprazole  Injectable 40 milliGRAM(s) IV Push daily  senna 2 Tablet(s) Oral at bedtime  sertraline 100 milliGRAM(s) Oral daily  sodium zirconium cyclosilicate 10 Gram(s) Oral daily  thiamine 100 milliGRAM(s) Oral daily  traMADol 25 milliGRAM(s) Oral once    PRN Inpatient Medications      REVIEW OF SYSTEMS  --------------------------------------------------------------------------------  Gen: No  fevers/chills  Respiratory: No dyspnea, cough  CV: No chest pain  GI: No abdominal pain, diarrhea,  nausea, vomiting  : No increased frequency, dysuria, hematuria  MSK:  no edema  Neuro: No dizziness/lightheadedness    All other systems were reviewed and are negative, except as noted.    VITALS/PHYSICAL EXAM  --------------------------------------------------------------------------------  T(C): 36.8 (03-11-22 @ 13:40), Max: 37.1 (03-11-22 @ 04:00)  HR: 102 (03-11-22 @ 13:40) (88 - 114)  BP: 86/68 (03-11-22 @ 11:31) (86/68 - 99/70)  RR: 16 (03-11-22 @ 13:40) (16 - 22)  SpO2: 98% (03-11-22 @ 13:40) (98% - 100%)  Wt(kg): --        03-10-22 @ 07:01  -  03-11-22 @ 07:00  --------------------------------------------------------  IN: 1325 mL / OUT: 1380 mL / NET: -55 mL      Physical Exam:  	Gen: NAD  	HEENT: MMM  	Pulm: CTA B/L  	CV: S1S2  	Abd: Soft, +BS   	Ext: No LE edema B/L, no asterixis  	Neuro: Awake, alert  	Skin: Warm and dry  	Vascular access:    LABS/STUDIES  --------------------------------------------------------------------------------              8.8    14.43 >-----------<  203      [03-11-22 @ 11:26]              27.3     127  |  92  |  32  ----------------------------<  128      [03-11-22 @ 11:26]  4.9   |  23  |  1.12        Ca     9.4     [03-11-22 @ 11:26]      Mg     2.0     [03-11-22 @ 11:26]      Phos  3.9     [03-11-22 @ 05:43]    TPro  9.8  /  Alb  3.5  /  TBili  0.4  /  DBili  x   /  AST  36  /  ALT  20  /  AlkPhos  170  [03-11-22 @ 05:43]          Creatinine Trend:  SCr 1.12 [03-11 @ 11:26]  SCr 1.05 [03-11 @ 09:11]  SCr 1.04 [03-11 @ 05:43]  SCr 0.98 [03-10 @ 06:26]  SCr 0.99 [03-09 @ 10:52]    Urinalysis - [03-01-22 @ 00:42]      Color Yellow / Appearance Clear / SG 1.022 / pH 5.5      Gluc Negative / Ketone Small  / Bili Negative / Urobili Negative       Blood Large / Protein 30 mg/dL / Leuk Est Moderate / Nitrite Negative       / WBC 22 / Hyaline 6 / Gran  / Sq Epi  / Non Sq Epi 3 / Bacteria Negative      Iron 23, TIBC 159, %sat 15      [02-28-22 @ 04:25]  Ferritin 1828      [02-28-22 @ 04:25]  Vitamin D (25OH) 17.8      [12-23-21 @ 07:41]  HbA1c 6.2      [09-01-17 @ 00:08]  TSH 2.68      [02-28-22 @ 08:19]  Lipid: chol 153, , HDL 34, LDL --      [08-15-21 @ 13:53]    HBsAb Reactive      [01-14-21 @ 10:47]  HBsAg Nonreact      [01-14-21 @ 10:47]  HBcAb Reactive      [01-14-21 @ 10:47]  HCV 0.08, Nonreact      [01-14-21 @ 10:47]  HIV Nonreact      [01-14-21 @ 08:18]    DORYS: titer 1:320, pattern Homogeneous      [08-15-21 @ 10:35]  Rheumatoid Factor 19      [08-15-21 @ 10:20]  ANCA: cANCA Negative, pANCA Negative, atypical ANCA Negative      [08-15-21 @ 10:35]  Syphilis Screen (Treponema Pallidum Ab) Negative      [01-21-22 @ 08:20]  Free Light Chains: kappa 4.78, lambda 3.60, ratio = 1.33      [08-15 @ 10:20]  Immunofixation Serum: No Monoclonal Band Identified    Reference Range: None Detected      [08-15-21 @ 10:20]  SPEP Interpretation: Polyclonal Gammopathy      [08-15-21 @ 10:20]  Immunofixation Urine: Reference Range: None Detected      [01-20-21 @ 15:16]  UPEP Interpretation: Normal Electrophoresis Pattern      [01-20-21 @ 15:16]   [facility] DIVISION OF KIDNEY DISEASES AND HYPERTENSION -- [phone]  -- INITIAL CONSULT NOTE  --------------------------------------------------------------------------------  HPI: 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 s/p HM2 LVAD in 9/2017 at  (due to severe PAD) with TV ring, multiple GI bleeds, thus maintained off AC, prior COVID-19 infection in 4/2020, recurrent syncope post LVAD s/p ILR, prolonged complex hospitalization from 6/14-11/16/2021 with GIB, respiratory failure s/p trach decanulated 12/2021, multiple infections, s/p cholecystotomy tube and SMA stent 10/2021, recurrent COVID 19 reinfection s/p MAB/remdesivir/steroids and distributive shock with serratia bacteremia, recurrent VT on mexiletine transferred from [facility] ED with metabolic encephalopathy, hypercarbic respiratory failure weaned off bipap, septic shock requiring pressors (now on midodrine) with serratia bacteremia on cefepime,  episodes of recurrent VT s/p lidocaine gtt now back on mexiletine.  Nephrology called for hyponatremia. Pt has had a history of chronic hyponatremia int he past. Most recently his Na corrected to 135 on 3/8, now trending back down to 127. Pt recieved multiple rounds of IVF since admission. On Cefepime with D5. Of note, he was also on sodium chloride 1 gram q8h at some point. Currently on methimazole & zoloft.             PAST HISTORY  --------------------------------------------------------------------------------  PAST MEDICAL & SURGICAL HISTORY:  CHF (congestive heart failure)    CAD (coronary artery disease)    Depression    Pleural effusion    History of 2019 novel coronavirus disease (COVID-19)  april 2020  Patient seen & examined. Denies chest pain, fever, chills, increased frequency, dysuria, hematuria, pus in urine, frothy urine, SOB, leg edema, loss of appetite, pruritis, N/V.    Hemorrhoids    Bleeding hemorrhoids    Peripheral arterial disease    Claudication    BPH with urinary obstruction    ACC/AHA stage D systolic heart failure    Anticoagulation goal of INR 2.0 to 2.5    Falls    Clavicle fracture    CKD (chronic kidney disease), stage III    Iron deficiency anemia    H/O epistaxis    Vertigo    GI bleed    S/P TVR (tricuspid valve repair)    S/P ventricular assist device    S/P endoscopy    H/O tracheostomy      FAMILY HISTORY:    PAST SOCIAL HISTORY:    ALLERGIES & MEDICATIONS  --------------------------------------------------------------------------------  Allergies    Amiodarone Hydrochloride (Other (Severe))    Intolerances      Standing Inpatient Medications  ascorbic acid 500 milliGRAM(s) Oral daily  cefepime   IVPB 1000 milliGRAM(s) IV Intermittent every 12 hours  chlorhexidine 2% Cloths 1 Application(s) Topical <User Schedule>  cholecalciferol 2000 Unit(s) Oral daily  insulin lispro (ADMELOG) corrective regimen sliding scale   SubCutaneous every 6 hours  methimazole 10 milliGRAM(s) Oral daily  mexiletine 150 milliGRAM(s) Oral every 8 hours  mirtazapine 7.5 milliGRAM(s) Oral at bedtime  multivitamin 1 Tablet(s) Oral daily  pantoprazole  Injectable 40 milliGRAM(s) IV Push daily  senna 2 Tablet(s) Oral at bedtime  sertraline 100 milliGRAM(s) Oral daily  sodium zirconium cyclosilicate 10 Gram(s) Oral daily  thiamine 100 milliGRAM(s) Oral daily  traMADol 25 milliGRAM(s) Oral once    PRN Inpatient Medications      REVIEW OF SYSTEMS  --------------------------------------------------------------------------------  Gen: No  fevers/chills  Respiratory: No dyspnea, cough  CV: No chest pain  GI: No abdominal pain, diarrhea,  nausea, vomiting  : No increased frequency, dysuria, hematuria  MSK:  no edema  Neuro: No dizziness/lightheadedness    All other systems were reviewed and are negative, except as noted.    VITALS/PHYSICAL EXAM  --------------------------------------------------------------------------------  T(C): 36.8 (03-11-22 @ 13:40), Max: 37.1 (03-11-22 @ 04:00)  HR: 102 (03-11-22 @ 13:40) (88 - 114)  BP: 86/68 (03-11-22 @ 11:31) (86/68 - 99/70)  RR: 16 (03-11-22 @ 13:40) (16 - 22)  SpO2: 98% (03-11-22 @ 13:40) (98% - 100%)  Wt(kg): --        03-10-22 @ 07:01  -  03-11-22 @ 07:00  --------------------------------------------------------  IN: 1325 mL / OUT: 1380 mL / NET: -55 mL      Physical Exam:  	Gen: lethargic, on NC  	HEENT: MMM  	Pulm: CTA B/L  	CV: S1S2  	Abd: Soft, +BS   	Ext: No LE edema B/L  	Neuro: lethargic  	Skin: Warm and dry  	Vascular access:    LABS/STUDIES  --------------------------------------------------------------------------------              8.8    14.43 >-----------<  203      [03-11-22 @ 11:26]              27.3     127  |  92  |  32  ----------------------------<  128      [03-11-22 @ 11:26]  4.9   |  23  |  1.12        Ca     9.4     [03-11-22 @ 11:26]      Mg     2.0     [03-11-22 @ 11:26]      Phos  3.9     [03-11-22 @ 05:43]    TPro  9.8  /  Alb  3.5  /  TBili  0.4  /  DBili  x   /  AST  36  /  ALT  20  /  AlkPhos  170  [03-11-22 @ 05:43]          Creatinine Trend:  SCr 1.12 [03-11 @ 11:26]  SCr 1.05 [03-11 @ 09:11]  SCr 1.04 [03-11 @ 05:43]  SCr 0.98 [03-10 @ 06:26]  SCr 0.99 [03-09 @ 10:52]    Urinalysis - [03-01-22 @ 00:42]      Color Yellow / Appearance Clear / SG 1.022 / pH 5.5      Gluc Negative / Ketone Small  / Bili Negative / Urobili Negative       Blood Large / Protein 30 mg/dL / Leuk Est Moderate / Nitrite Negative       / WBC 22 / Hyaline 6 / Gran  / Sq Epi  / Non Sq Epi 3 / Bacteria Negative      Iron 23, TIBC 159, %sat 15      [02-28-22 @ 04:25]  Ferritin 1828      [02-28-22 @ 04:25]  Vitamin D (25OH) 17.8      [12-23-21 @ 07:41]  HbA1c 6.2      [09-01-17 @ 00:08]  TSH 2.68      [02-28-22 @ 08:19]  Lipid: chol 153, , HDL 34, LDL --      [08-15-21 @ 13:53]    HBsAb Reactive      [01-14-21 @ 10:47]  HBsAg Nonreact      [01-14-21 @ 10:47]  HBcAb Reactive      [01-14-21 @ 10:47]  HCV 0.08, Nonreact      [01-14-21 @ 10:47]  HIV Nonreact      [01-14-21 @ 08:18]    DORYS: titer 1:320, pattern Homogeneous      [08-15-21 @ 10:35]  Rheumatoid Factor 19      [08-15-21 @ 10:20]  ANCA: cANCA Negative, pANCA Negative, atypical ANCA Negative      [08-15-21 @ 10:35]  Syphilis Screen (Treponema Pallidum Ab) Negative      [01-21-22 @ 08:20]  Free Light Chains: kappa 4.78, lambda 3.60, ratio = 1.33      [08-15 @ 10:20]  Immunofixation Serum: No Monoclonal Band Identified    Reference Range: None Detected      [08-15-21 @ 10:20]  SPEP Interpretation: Polyclonal Gammopathy      [08-15-21 @ 10:20]  Immunofixation Urine: Reference Range: None Detected      [01-20-21 @ 15:16]  UPEP Interpretation: Normal Electrophoresis Pattern      [01-20-21 @ 15:16]

## 2022-03-11 NOTE — PROGRESS NOTE ADULT - PROBLEM SELECTOR PLAN 3
EF 10%  s/p LVAD without s/s of pump malfunction  holding BB and spironolactone given low bp  recurrent NSVT/VT when he first arrived started on lidocaine infusion with improvement now back on mexiletine  HF team following  GOC discussion  keep K>4, Mg>2

## 2022-03-11 NOTE — PROGRESS NOTE ADULT - ASSESSMENT
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    S/p prolonged complex hospitalization from 6/14-11/16/2021 initially admitted with abdominal pain complicated by GIB, respiratory failure s/p trach, multiple infections, s/p cholecystotomy tube and SMA stent 10/2021, ultimately discharged to [facility] rehab and then returned to [facility] for trach decannulation 12/2. The patient had a recent admission with COVID 19 reinfection and distributive shock. That admission he had blood culture positive for serratia marcescens (discharged on levaquin).  He was treated with MAB, remdesivir, and steroids. He had recurrent VT and was started mexiletine.     Now coming in from [facility] ED with leukocytosis and AMS was treated for UTI. Initially required bipap but was weaned off here. Labs concerning here for WBC 26, hemoglobin of 7s then 6.7, creatinine of 1.3. His maps were initially in the 50s. Physical exam showing pus from stoma, sat of central access of 81.8, and tachycardia are all concerning for septic shock picture. He is s/p 1L fluids, 1uPRBCs, was placed on vaso. Patient has known episodes of VT and had recurrent NSVT/VT when he first arrived started on lidocaine infusion with improvement. Of note he was previously on mexiletine 150 TID outpatient, metoprolol ER 25 qAM and 50 qPM. Previous history of thyrotoxicosis on amiodarone.    He was found to be bacteremic with recurrent serratia, on IV abx possibly r/t LVAD infection vs splenic abscess noted on CT 1/19. Afebrile since 3/1. s/p 1 unit PRBC 3/6. Vasopressin weaned off 3/4, started on midodrine which has now been off since 3/8. LVAD without s/s of pump malfunction. This morning he appears alert but is nonverbal and not following commands. His mental status is still not back to baseline. He's continued to have VT on tele. He was restarted only yesterday on low dose beta blocker and was previously on a higher dose at home. Noted to have hyperkalemia this morning although labs were moderately hemolyzed. Potassium has improved following treatment insulin/D50 and lokelma. Rising leukocytosis today although afebrile on IV abx. Overall fairly stable from HF perspective. 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 Being evaluated for PEG by GI.

## 2022-03-11 NOTE — CONSULT NOTE ADULT - ASSESSMENT
#. Evaluation for PEG placement 2/2 dysphagia c/b TEF with NGT in place    Recommendations:  - Not an appropriate candidate for PEG placement at this time; not medically optimized, unclear source of recurrent bacteremia with cardiac arrhythmias  - Note PEG placement has not been shown to provide significant prolongation of life and still presents with risk of aspiration          #. Evaluation for PEG placement 2/2 dysphagia c/b TEF with NGT in place  #. Hyponatremia, hyperkalemia  #. Recurrent bacteremia c/b serratia marcesens of unclear etiology    Recommendations:  - Not an appropriate candidate for PEG placement at this time; not medically optimized, hyponatremic/hyperkalemic and unclear source of recurrent bacteremia with cardiac arrhythmias  - Note, PEG placement has not been shown to provide significant prolongation of life and still presents with risk of aspiration   - c/w NGT feeds as tolerated  - GOC discussion  - Daily CBC, BMP, correct derangements  - Transfuse if Hgb < 8, consider cardiac history  - Ensure adequate hydration  - Cardiology, EP, ID on board; appreciate recs  - Rest of care per primary    Thank you for involving us in this patient's care. Please re-consult GI when patient is medically optimized with cardiac clearance and GI will re-assess the patient for endoscopic PEG placement if within GOC.    Note not final until Attending signs.    Aleksandra Kelly MD  Gastroenterology/Hepatology Fellow, PGY-V    NON-URGENT CONSULTS:  Please email [email or web address]  AT NIGHT AND ON WEEKENDS:  Contact on-call GI fellow via answering service ([phone]) from 5pm-8am and on weekends/holidays  MONDAY-FRIDAY 8AM-5PM:  Pager# [phone] ([facility])  GI Phone# [phone] ([facility])   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 s/p HM2 LVAD in 9/2017 at  (due to severe PAD) with TV ring, h/o mid-SB bleeding on VCE (negative on PUSH 6/15/21), subsequently maintained off AC, CKD 3, prior COVID-19 infection in 4/2020, recurrent syncope post LVAD s/p ILR, s/p prolonged complex hospitalization from 6/14-11/16/2021 initially admitted at that time with abdominal pain s/p VCE/PUSH 6/15/21, respiratory failure s/p trach, multiple infections, s/p cholecystotomy tube and SMA stent 10/2021, ultimately discharged to [facility] rehab and then returned to [facility] for trach decannulation 12/2, readmitted in 2/2022 with recurrent COVID-19 infection, initially in distributive shock with serratia marcesens, with declining condition and more lethargic/less responsive with hyponatremia, hyperkalemia, bacteremia with unknown source, multiple runs of VT (3/11) with NGT in place, GI consulted for PEG evaluation.      #. Evaluation for PEG placement 2/2 dysphagia c/b TEF with NGT in place  #. Hyponatremia, hyperkalemia  #. Recurrent bacteremia c/b serratia marcesens of unclear etiology    Recommendations:  - Not an appropriate candidate for PEG placement at this time; not medically optimized, hyponatremic/hyperkalemic and unclear source of recurrent bacteremia with cardiac arrhythmias  - Note, PEG placement has not been shown to provide significant prolongation of life and still presents with risk of aspiration   - c/w NGT feeds as tolerated  - GOC discussion  - Daily CBC, BMP, correct derangements  - Transfuse if Hgb < 8, consider cardiac history  - Ensure adequate hydration  - Cardiology, EP, ID on board; appreciate recs  - Rest of care per primary    Thank you for involving us in this patient's care. Please re-consult GI when patient is medically optimized with cardiac clearance and GI will re-assess the patient for endoscopic PEG placement if within GOC.    Note not final until Attending signs.    Aleksandra Kelly MD  Gastroenterology/Hepatology Fellow, PGY-V    NON-URGENT CONSULTS:  Please email [email or web address]  AT NIGHT AND ON WEEKENDS:  Contact on-call GI fellow via answering service ([phone]) from 5pm-8am and on weekends/holidays  MONDAY-FRIDAY 8AM-5PM:  Pager# [phone] ([facility])  GI Phone# [phone] ([facility])   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 s/p HM2 LVAD in 9/2017 at  (due to severe PAD) with TV ring, h/o mid-SB bleeding on VCE (negative on PUSH 6/15/21), subsequently maintained off AC, CKD 3, prior COVID-19 infection in 4/2020, recurrent syncope post LVAD s/p ILR, s/p prolonged complex hospitalization from 6/14-11/16/2021 initially admitted at that time with abdominal pain s/p VCE/PUSH 6/15/21, respiratory failure s/p trach, multiple infections, s/p cholecystotomy tube and SMA stent 10/2021, TEF, ultimately discharged to [facility] rehab and then returned to [facility] for trach decannulation 12/2, readmitted in 2/2022 with recurrent COVID-19 infection, initially in distributive shock with serratia marcesens, with declining condition and more lethargic/less responsive with hyponatremia, hyperkalemia, bacteremia with unknown source, multiple runs of VT (3/11) with NGT in place, GI consulted for PEG evaluation.      #. Evaluation for PEG placement 2/2 dysphagia c/b TEF with NGT in place  #. Hyponatremia, hyperkalemia  #. Recurrent bacteremia c/b serratia marcesens of unclear etiology  #. TEF - evaluated by ENT with plans to close when medically optimized and stable    Recommendations:  - Not an appropriate candidate for PEG placement at this time; not medically optimized, hyponatremic/hyperkalemic and unclear source of recurrent bacteremia with cardiac arrhythmias  - Note, PEG placement has not been shown to provide significant prolongation of life and still presents with risk of aspiration   - c/w NGT feeds as tolerated  - GOC discussion  - Daily CBC, BMP, correct derangements  - Transfuse if Hgb < 8, consider cardiac history  - Ensure adequate hydration  - Cardiology, EP, ID on board; appreciate recs  - Rest of care per primary    Thank you for involving us in this patient's care. Please re-consult GI when patient is medically optimized with cardiac clearance and GI will re-assess the patient for endoscopic PEG placement if within GOC.    Note not final until Attending signs.    Aleksandra Kelly MD  Gastroenterology/Hepatology Fellow, PGY-V    NON-URGENT CONSULTS:  Please email [email or web address]  AT NIGHT AND ON WEEKENDS:  Contact on-call GI fellow via answering service ([phone]) from 5pm-8am and on weekends/holidays  MONDAY-FRIDAY 8AM-5PM:  Pager# [phone] ([facility])  GI Phone# [phone] ([facility])

## 2022-03-11 NOTE — PROGRESS NOTE ADULT - SUBJECTIVE AND OBJECTIVE BOX
INFECTIOUS DISEASES FOLLOW UP-- Anitra Prater  [phone]    This is a follow up note for this  66yMale with  Sepsis        ROS:  CONSTITUTIONAL:  No fever, good appetite  CARDIOVASCULAR:  No chest pain or palpitations  RESPIRATORY:  No dyspnea  GASTROINTESTINAL:  No nausea, vomiting, diarrhea, or abdominal pain  GENITOURINARY:  No dysuria  NEUROLOGIC:  No headache,     Allergies    Amiodarone Hydrochloride (Other (Severe))    Intolerances        ANTIBIOTICS/RELEVANT:  antimicrobials  cefepime   IVPB 1000 milliGRAM(s) IV Intermittent every 12 hours    immunologic:    OTHER:  ascorbic acid 500 milliGRAM(s) Oral daily  chlorhexidine 2% Cloths 1 Application(s) Topical <User Schedule>  cholecalciferol 2000 Unit(s) Oral daily  insulin lispro (ADMELOG) corrective regimen sliding scale   SubCutaneous every 6 hours  methimazole 10 milliGRAM(s) Oral daily  mexiletine 150 milliGRAM(s) Oral every 8 hours  mirtazapine 7.5 milliGRAM(s) Oral at bedtime  multivitamin 1 Tablet(s) Oral daily  pantoprazole  Injectable 40 milliGRAM(s) IV Push daily  senna 2 Tablet(s) Oral at bedtime  sertraline 100 milliGRAM(s) Oral daily  sodium zirconium cyclosilicate 10 Gram(s) Oral daily  thiamine 100 milliGRAM(s) Oral daily  traMADol 25 milliGRAM(s) Oral once      Objective:  Vital Signs Last 24 Hrs  T(C): 36 (11 Mar 2022 11:31), Max: 37.1 (11 Mar 2022 04:00)  T(F): 96.8 (11 Mar 2022 11:31), Max: 98.7 (11 Mar 2022 04:00)  HR: 88 (11 Mar 2022 11:31) (88 - 114)  BP: 86/68 (11 Mar 2022 11:31) (86/68 - 99/70)  BP(mean): 73 (11 Mar 2022 11:31) (72 - 88)  RR: 18 (11 Mar 2022 11:31) (18 - 22)  SpO2: 99% (11 Mar 2022 11:31) (98% - 100%)    PHYSICAL EXAM:  Constitutional:no acute distress  Eyes:ALONSO, EOMI  Ear/Nose/Throat: no oral lesions, 	  Respiratory: clear BL  Cardiovascular: S1S2  Gastrointestinal:soft, (+) BS, no tenderness  Extremities:no e/e/c  No Lymphadenopathy  IV sites not inflammed.    LABS:                        8.8    14.43 )-----------( 203      ( 11 Mar 2022 11:26 )             27.3     03-11    127<L>  |  92<L>  |  32<H>  ----------------------------<  128<H>  4.9   |  23  |  1.12    Ca    9.4      11 Mar 2022 11:26  Phos  3.9     03-11  Mg     2.0     03-11    TPro  9.8<H>  /  Alb  3.5  /  TBili  0.4  /  DBili  x   /  AST  36  /  ALT  20  /  AlkPhos  170<H>  03-11          MICROBIOLOGY:            RECENT CULTURES:  03-06 @ 14:17  .Blood Blood-Arterial  --  --  --    No growth to date.  --  03-05 @ 15:33  .Sputum Trach Site  --  --  --    Normal Respiratory Bria present  --      RADIOLOGY & ADDITIONAL STUDIES: INFECTIOUS DISEASES FOLLOW UP-- Anitra Prater  [phone]    This is a follow up note for this  66yMale with  Sepsis  less responsive and interactive today        ROS:  CONSTITUTIONAL: opens eyes, does not interact  Allergies    Amiodarone Hydrochloride (Other (Severe))    Intolerances        ANTIBIOTICS/RELEVANT:  antimicrobials  cefepime   IVPB 1000 milliGRAM(s) IV Intermittent every 12 hours    immunologic:    OTHER:  ascorbic acid 500 milliGRAM(s) Oral daily  chlorhexidine 2% Cloths 1 Application(s) Topical <User Schedule>  cholecalciferol 2000 Unit(s) Oral daily  insulin lispro (ADMELOG) corrective regimen sliding scale   SubCutaneous every 6 hours  methimazole 10 milliGRAM(s) Oral daily  mexiletine 150 milliGRAM(s) Oral every 8 hours  mirtazapine 7.5 milliGRAM(s) Oral at bedtime  multivitamin 1 Tablet(s) Oral daily  pantoprazole  Injectable 40 milliGRAM(s) IV Push daily  senna 2 Tablet(s) Oral at bedtime  sertraline 100 milliGRAM(s) Oral daily  sodium zirconium cyclosilicate 10 Gram(s) Oral daily  thiamine 100 milliGRAM(s) Oral daily  traMADol 25 milliGRAM(s) Oral once      Objective:  Vital Signs Last 24 Hrs  T(C): 36 (11 Mar 2022 11:31), Max: 37.1 (11 Mar 2022 04:00)  T(F): 96.8 (11 Mar 2022 11:31), Max: 98.7 (11 Mar 2022 04:00)  HR: 88 (11 Mar 2022 11:31) (88 - 114)  BP: 86/68 (11 Mar 2022 11:31) (86/68 - 99/70)  BP(mean): 73 (11 Mar 2022 11:31) (72 - 88)  RR: 18 (11 Mar 2022 11:31) (18 - 22)  SpO2: 99% (11 Mar 2022 11:31) (98% - 100%)    PHYSICAL EXAM:  Constitutional:no acute distress  Eyes:ALONSO  Ear/Nose/Throat: ng feeding tube	  Respiratory: clear BL  Cardiovascular: LVAD  Gastrointestinal:soft, (+) BS, no tenderness  Extremities:no e/e/c  No Lymphadenopathy  IV sites not inflammed.    LABS:                        8.8    14.43 )-----------( 203      ( 11 Mar 2022 11:26 )             27.3     03-11    127<L>  |  92<L>  |  32<H>  ----------------------------<  128<H>  4.9   |  23  |  1.12    Ca    9.4      11 Mar 2022 11:26  Phos  3.9     03-11  Mg     2.0     03-11    TPro  9.8<H>  /  Alb  3.5  /  TBili  0.4  /  DBili  x   /  AST  36  /  ALT  20  /  AlkPhos  170<H>  03-11          MICROBIOLOGY:            RECENT CULTURES:  03-06 @ 14:17  .Blood Blood-Arterial  --  --  --    No growth to date.  --  03-05 @ 15:33  .Sputum Trach Site  --  --  --    Normal Respiratory Bria present  --      RADIOLOGY & ADDITIONAL STUDIES:

## 2022-03-11 NOTE — CHART NOTE - NSCHARTNOTEFT_GEN_A_CORE
MEDICINE PA NOTE    Notified by RN patient with multiple runs of Vtach on tele, longest 42 beats of WCT, along with labored breathing. Patient seen and evaluated at bedside, appears comfortable and in NAD. Unable to obtain ROS due to mentation. Sating 100% on 2L NC (and 98% on room air), RR normal 18.  Of note, this AM patient with K+ 6.1 on labs and given insulin 10 units and 1 amp of D50. Mg normal 2.1.  Reached out to HF Dr Martinez and reviewed medications given today, recommended to give extra dose Toprol XL 25mg and increase standing dose to 50mg starting tomorrow. Also will continue monitoring BMP and Mg and correct electrolytes as needed.   Given concern for labored breathing, ordered CXR and ABG w/lactate to r/o aspiration and CO2 retention.     CLAUDIA Tellez  [id number]

## 2022-03-11 NOTE — PROGRESS NOTE ADULT - PROBLEM SELECTOR PLAN 3
- given an additional Toprol XL 25mg this morning in setting of VT. Will increase dose to 50mg daily starting tomorrow.  - holding carlos given hyperkalemia. Please give Lokelma 10mg daily.  - maintain I=O

## 2022-03-11 NOTE — CONSULT NOTE ADULT - ATTENDING COMMENTS
Patient seen and examined with DR Cunningham  I agree with his history exam and plans as noted above    Patient well known to me from multiple prior admissions both for COVID infections and also with recurrent bacteremias, multiple bouts of serratia bacteremia of not entirely clear etiology.  Bowel translocation vs driveline infection  exit site appears CDI on exam  Patient appears quite ill  Bipap face mask  coarse BS bilat  LVAD sounds  cholecystotomy tube with bilious drainage    blood cultures x2 sets  UA and culture  sputum from former trach ostomy site for cortez staina nd culture    Agree with broad spectrum antibiotics  Zosyn/Vanco acceptable  monitor CMP creatinine closely  CBC with diff  check Vanco trough prior to fourth dose    Morris Prater MD  [phone]  After 5pm/weekends [phone]
Recurrent VT through mexiletine in the setting of distributive shock. Hemodynamically tolerated on VAD. Would continue to manage conservatively absent symptomatic events.
HPI as per resident note, personally verified by me    FHx: Due to clinical condition unable to assess (MARELY)    SHx: MARELY    Gen - NAD, nasal cannula in place  CV - peripheral pulses palpable, no edema    MS - Stuporous, attends to tactile stimuli in BUE's and face b/l, no speech output, does not follow commands. MARELY orientation, rep/naming, attn/conc, recent and remote memory, fund of knowledge  CN - PERRL, EOMI by OCR, (+) face sens/str by corneals b/l and he forcibly closes his eyes to attempts to open them, (+) spontaneous respirations, SCM at least 4+/5 b/l and symmetric, VF as he does not blink to threat. MARELY hearing, tongue/palate. Fundoscopy not well visualized  Motor - Decreased bulk, paratonia of BUE's and normal tone of BLE's. No spontaneous movements. He does have some possibly protective movements of arms with BUE's at least 2/5 proximally and 3/5 distally and symmetric and he does grimace to strong noxious stimuli. No grimace or withdrawal to strong noxious stimuli in BLE's  Sens - As above  DTR's - 2+ RUE, 2+ brisk L BR/triceps, 3+ L biceps, 3+ R KJ, 2+ L KJ, 1+ R AJ, 0+ L AJ, and neutral b/l plantar response  Coord - MARELY  Gait and station - MARELY    A/P:  Encephalopathy  Sepsis  Recent COVID-19 infection  Non-ischemic cardiomyopathy with LVAD  CKD stage 3  Thrombocytopenia (plt 85)  Vitamin D deficiency (17.8)    - Etiology for encephalopathy is most likely due to ongoing medical problems with toxic/metabolic factors but need to exclude neurologic specific causes such as seizures. Less likely cerebrovascular but does have some asymmetry of DTR's on exam so agree with excluding this. Recent CT head unrevealing but cannot get MRI due to LVAD  - Agree with repeat CT head w/o  - rEEG to evaluate for focal slowing, epileptiform discharges, or seizures  - Check labs for additional causes with B1, NH3  - Vitamin D levels low, would replete with D2 17271 Units PO weekly for 8 weeks and then recheck new levels  - Continue to address above medical problems, as you are doing  - Will continue to follow patient with you
Patient seen and examined, agree with above. Patient with hyponatremia and bacteremia suspected to be related to LVAD. Also is having ventricular arrhythmia, had a run of 42 beats just today. He is at very high risk for EGD/PEG placement at this time, the risks in light of current comorbidities likely outweigh the benefits. Would continue GOC with family given poor prognosis. Continue NGT feeds in the meantime.
no cellulitis at tracheostomy stoma, secretions were likely from trachea or lungs   tracheoscopy consistent with patent trachea, no evidence of narrowing or tracheitis  pt would be a candidate for closure of the TCF, but would not recommend while admitted for respiratory failure (pt is currently on bipap) as he may be intubated and then might require another trach again.  Once stable can proceed with closure if desired.   call with any questions.
hyponatremia- likely ADH dependent.   check urine osm/sodium/potassium  meanwhile- fluid restrict to 1 L and avoid hypotonic fluids.     kayla garrison  nephrology attending   Cell# [phone]   Office- [phone]
66yrs, DCM, s/p HM 2 TVring Sept 2017.   maintained off AC for multiple GI bleeds.   COVID april 2020.   CKD basline Cr .6   June-Nov 2021: GI bleed. Reps failure. Trached. Cholecystectomy tube placed. SMA stent for persistent abdo pain, nausea and malnutrition. d/c with trach to rehab. Readmitted 12.-12.6 for successful decannulation.   Readmitted with COVID 12.13-2.4. Required ICU for borderline BP but never significantly hypoxic. Received monoclonal abs, rendosivir, dexamethasone. Bacteremia seratia (recurrent).   Social situation: Patient did not want to go home to live with his sister where he had been living previously. AT THAT TIME PATIENT DID NOT WANT HIS SISTER TO CONTINUE TO BE HIS HEALTH CARE PROXY. There had been discussion amongst the LVAD coordinators about the need for a guardian in the event that he could not make decisions for himself. Patient was full code at d/c. Patient was ultimately to stay at rehab fascility which was to transition to long term care.   d/c to rehab. febrile 4-5 days. altered MS. tachcaardia. to [facility] on 2.27: WBC 2oK, hypercarbic. tachycaadic. BIPAP.   transferred to CICU 2.27.   Overnight: CVP 2. NaCl, LR, albumin. Vanco/cefipime, 1XPRBCs  seen by EP for NSVT. lidocaine drip until can take PO.   d/c meds: mexilitetene 150 bid, toprol 50/25, reglan, melatonin, mg, cirilo, drobenol, vanco PO, levoflox 500 PO, ald 25. mertazapine, sertraline,   current meds: lido 1/hr, vaso .08, cefipime, vanco,   LVAD flows 4.6, PI 4.4, power 5.2. (PI events prior 2 dasy).   CVP 9, CVP sat 76, MAPs 67-82, tmax 38.3. 70SR.   UO 25-40  I/o +3.7  02-28  143  |  112<H>  |  23  ----------------------------<  151<H>  4.8   |  19<L>  |  1.14  Ca    8.9      28 Feb 2022 04:53  Phos  2.9     02-28  Mg     2.1     02-28  TPro  7.6  /  Alb  2.6<L>  /  TBili  0.5  /  DBili  x   /  AST  139<H>  /  ALT  60<H>  /  AlkPhos  143<H>  02-28                          6.6    16.79 )-----------( 87       ( 28 Feb 2022 10:29 )             21.2   WBC 16.1, 16.7,   INR 1.3,   lactate 1.2.   Ferritin 1828, ESR 83. CRP  Critically ill. Multiple medical issues. Now admitted with recurrent sepsis. Cultures may have been drawn after abs.   Plan:  please ask Dr Prater to consult.   please ask palliative care to consult (know patient) re decision making -see my note above. patients most recent wishes at time of d/c were to live and he was not interested in DNR/DNI.   please obtain cultures from [facility], if they were drawn prior to antibiotics.   it appears that patients tracheostomy may have reopened- he may not be a candidate for BIPAP.   Zi Werner

## 2022-03-11 NOTE — PROGRESS NOTE ADULT - ASSESSMENT
66 years old male with PMHx of Stage D HF 2/2 NICM s/p HM2 LVAD in 9/2017 at  (due to severe PAD) with TV ring, multiple GI bleeds, no AC, CKD 3 prior COVID-19 infection in 4/2020, chronic cholecystitis s/p percutaneous cholecystostomy tube, recurrent COVID infection, Serratia bacteremia transferred from [facility] for sepsis.    ID is consulted for septic shock  Recently had Serratia bacteremia discharged on suppressive PO Levaquin  Returned with leukocytosis, fever, AMS, hypotension requiring pressors  ?purulent sputum coming out from previous trach site  CXR no PNA  CTH unrevealing  LVAD exit sites shows no signs of infection  Perc dawn tube site shows no signs of infection  BCx Serratia, S cefepime and ertapenem, R quinolones  CT showed abdominal aortic thrombus and a 2.6cm splenic lesion    Antibiotics:  Vancomycin 2/28 - 3/1  Zosyn 2/28  Cefepime 2/28 -     Currently unclear etiology of septic shock likely secondary to recurrent serratia bacteremia  Source of serratia is could be LVAD, infected aortic thrombus or splenic lesion  Regardless will treat this as in intravascular infection so likely 4 - 6 weeks in total    Detailed discussion with CICU attending Dr. Jensen, patient likely would have worsening respiratory status but due to his multiple comorbidities and previous intubation, it is futile to escalate care to intubation as patient will likely unable to be weaned off ventilator.      IMPRESSION:  Septic shock 2/2 serratia bacteremia  Aortic thrombus  Splenic lesion  Leukocytosis  Fever  LVAD      RECOMMENDATIONS:  - Continue IV cefepime 1gm q12hrs  - Follow up blood cultures show evidence of bacteremia clearance  - mental status improved but not yet at baseline, off pressors and oxygen supplementation  -- will plan a prolonged course of Cefepime for 4 weeks    Morris Prater MD  [phone]  After 5pm/weekends [phone]     66 years old male with PMHx of Stage D HF 2/2 NICM s/p HM2 LVAD in 9/2017 at  (due to severe PAD) with TV ring, multiple GI bleeds, no AC, CKD 3 prior COVID-19 infection in 4/2020, chronic cholecystitis s/p percutaneous cholecystostomy tube, recurrent COVID infection, Serratia bacteremia transferred from [facility] for sepsis.    ID is consulted for septic shock  Recently had Serratia bacteremia discharged on suppressive PO Levaquin  Returned with leukocytosis, fever, AMS, hypotension requiring pressors  ?purulent sputum coming out from previous trach site  CXR no PNA  CTH unrevealing  LVAD exit sites shows no signs of infection  Perc dawn tube site shows no signs of infection  BCx Serratia, S cefepime and ertapenem, R quinolones  CT showed abdominal aortic thrombus and a 2.6cm splenic lesion    Antibiotics:  Vancomycin 2/28 - 3/1  Zosyn 2/28  Cefepime 2/28 -     Currently unclear etiology of septic shock likely secondary to recurrent serratia bacteremia  Source of serratia is could be LVAD, infected aortic thrombus or splenic lesion  Regardless will treat this as in intravascular infection so likely 4 - 6 weeks in total    Detailed discussion with CICU attending Dr. Jensen, patient likely would have worsening respiratory status but due to his multiple comorbidities and previous intubation, it is futile to escalate care to intubation as patient will likely unable to be weaned off ventilator.      IMPRESSION:  Septic shock 2/2 serratia bacteremia  Aortic thrombus  Splenic lesion  Leukocytosis  Fever  LVAD      RECOMMENDATIONS:  - Continue IV cefepime 1gm q12hrs  - Follow up blood cultures show evidence of bacteremia clearance  - mental status remains poor  -- will plan a prolonged course of Cefepime for 4 weeks  -not a candidate for PEG placement    Morris Prater MD  [phone]  After 5pm/weekends [phone]

## 2022-03-12 NOTE — PROGRESS NOTE ADULT - ASSESSMENT
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 s/p HM2 LVAD in 9/2017 at  (due to severe PAD) with TV ring, multiple GI bleeds, thus maintained off AC, prior COVID-19 infection in 4/2020, recurrent syncope post LVAD s/p ILR, prolonged complex hospitalization from 6/14-11/16/2021 with GIB, respiratory failure s/p trach decanulated 12/2021, multiple infections, s/p cholecystotomy tube and SMA stent 10/2021, recurrent COVID 19 reinfection s/p MAB/remdesivir/steroids and distributive shock with serratia bacteremia, recurrent VT on mexiletine transferred from [facility] ED with metabolic encephalopathy, hypercarbic respiratory failure weaned off bipap, septic shock requiring pressors (now on midodrine) with serratia bacteremia on cefepime, intermittent h/h drop s/p total 3units prbc (last 3/6), episodes of recurrent VT s/p lidocaine gtt now back on mexiletine.

## 2022-03-12 NOTE — PROGRESS NOTE ADULT - PROBLEM SELECTOR PLAN 1
Mild Chronic asymptomatic Hyponatremia- ADH mediated  Pt has had a history of chronic hyponatremia in the past.   Most recently his Na corrected to 135 on 3/8, now trending back down to 127.   Pt recieved multiple rounds of IVF since admission. On abx with D5.  Appears euvolemic on exam.   Last Posm 281 & Uosm 585 in december 2021.   repeat  Ludy 69, BVvw322, SOsm 274  Continue fluid restrict to <1L/day for now.   Increase PO solute intake.   Would start sodium chloride tabs 1 gm tid  Monitor SNa daily. Check TSH and Cortisol.   May continue with zoloft  Avoid hypotonic fluids & NS (unless for resuscitation) for now    If you have any questions, please feel free to contact me  Cindy Cueto  Nephrology Fellow  Pager NS: [phone]/ [facility]: [phone]    (After 5 pm or on weekends please page the on-call fellow, can check [email or web address] for schedule. Login is frances fisher, schedule under [facility] medicine, psych, derm). Mild Chronic asymptomatic Hyponatremia- ADH mediated  Pt has had a history of chronic hyponatremia in the past.   Most recently his Na corrected to 135 on 3/8, now trending back down to 127.   Pt recieved multiple rounds of IVF since admission. On abx with D5.  Appears euvolemic on exam.   Last Posm 281 & Uosm 585 in december 2021.   repeat  Ludy 69, AOcg026, SOsm 274  Continue fluid restrict to <1L/day for now.   Increase PO solute intake.   Would start sodium chloride tabs 1 gm tid with torsemide 5mg PO daily  Monitor SNa daily. Check TSH and Cortisol.   May continue with zoloft  Avoid hypotonic fluids & NS (unless for resuscitation) for now    If you have any questions, please feel free to contact me  Cindy Cueto  Nephrology Fellow  Pager NS: [phone]/ [facility]: [phone]    (After 5 pm or on weekends please page the on-call fellow, can check [email or web address] for schedule. Login is frances fisher, schedule under [facility] medicine, psych, derm).

## 2022-03-12 NOTE — PROGRESS NOTE ADULT - SUBJECTIVE AND OBJECTIVE BOX
[facility] Division of Hospital Medicine  Cristobal Sutherland  Contact on MS TEAMS    Patient is a 66y old  Male who presents with a chief complaint of Septic shock (12 Mar 2022 13:26)      SUBJECTIVE / OVERNIGHT EVENTS: nonverbal, awakens to name, using 2L oxygen. LVAD in place  ADDITIONAL REVIEW OF SYSTEMS: unable to respond.     MEDICATIONS  (STANDING):  ascorbic acid 500 milliGRAM(s) Oral daily  cefepime   IVPB 1000 milliGRAM(s) IV Intermittent every 12 hours  chlorhexidine 2% Cloths 1 Application(s) Topical <User Schedule>  cholecalciferol 2000 Unit(s) Oral daily  insulin lispro (ADMELOG) corrective regimen sliding scale   SubCutaneous every 6 hours  methimazole 10 milliGRAM(s) Oral daily  metoprolol succinate ER 50 milliGRAM(s) Oral daily  mexiletine 150 milliGRAM(s) Oral every 8 hours  mirtazapine 7.5 milliGRAM(s) Oral at bedtime  multivitamin 1 Tablet(s) Oral daily  pantoprazole  Injectable 40 milliGRAM(s) IV Push daily  senna 2 Tablet(s) Oral at bedtime  sertraline 100 milliGRAM(s) Oral daily  sodium zirconium cyclosilicate 10 Gram(s) Oral daily  thiamine 100 milliGRAM(s) Oral daily  traMADol 25 milliGRAM(s) Oral once    MEDICATIONS  (PRN):      CAPILLARY BLOOD GLUCOSE      POCT Blood Glucose.: 159 mg/dL (12 Mar 2022 16:58)  POCT Blood Glucose.: 164 mg/dL (12 Mar 2022 12:05)  POCT Blood Glucose.: 144 mg/dL (12 Mar 2022 06:28)  POCT Blood Glucose.: 126 mg/dL (12 Mar 2022 00:56)  POCT Blood Glucose.: 136 mg/dL (11 Mar 2022 21:24)    I&O's Summary    11 Mar 2022 07:01  -  12 Mar 2022 07:00  --------------------------------------------------------  IN: 1315 mL / OUT: 1240 mL / NET: 75 mL        PHYSICAL EXAM:  Vital Signs Last 24 Hrs  T(C): 36.8 (12 Mar 2022 13:13), Max: 37.1 (11 Mar 2022 17:49)  T(F): 98.2 (12 Mar 2022 13:13), Max: 98.8 (11 Mar 2022 17:49)  HR: 102 (12 Mar 2022 13:13) (89 - 103)  BP: --  BP(mean): 74 (12 Mar 2022 13:13) (70 - 76)  RR: 16 (12 Mar 2022 13:13) (16 - 18)  SpO2: 98% (12 Mar 2022 13:13) (98% - 100%)  CONSTITUTIONAL: NAD  EYES: PERRLA; conjunctiva and sclera clear  ENMT: Moist oral mucosa,   NECK: +stoma with purulent secretions  RESPIRATORY: Normal respiratory effort; lungs are clear to auscultation bilaterally  CARDIOVASCULAR: mechanical heart sounds; LVAd in place  ABDOMEN: Nontender to palpation, normoactive bowel sounds, no rebound/guarding; No hepatosplenomegaly;   MUSCULOSKELETAL:  Normal gait; no clubbing or cyanosis of digits; no joint swelling or tenderness to palpation  PSYCH: A+O to person, place, and time; affect appropriate  NEUROLOGY: CN 2-12 are intact and symmetric; no gross sensory deficits   SKIN: No rashes; no palpable lesions    LABS:                        8.3    14.28 )-----------( 210      ( 12 Mar 2022 06:37 )             26.6     03-12    128<L>  |  93<L>  |  33<H>  ----------------------------<  123<H>  5.0   |  22  |  1.20    Ca    9.8      12 Mar 2022 06:34  Phos  3.9     03-11  Mg     2.1     03-12    TPro  9.8<H>  /  Alb  3.5  /  TBili  0.4  /  DBili  x   /  AST  36  /  ALT  20  /  AlkPhos  170<H>  03-11          Urinalysis Basic - ( 11 Mar 2022 15:45 )    Color: Light Yellow / Appearance: Clear / S.016 / pH: x  Gluc: x / Ketone: Negative  / Bili: Negative / Urobili: Negative   Blood: x / Protein: 100 / Nitrite: Negative   Leuk Esterase: Small / RBC: 33 /hpf / WBC 11 /HPF   Sq Epi: x / Non Sq Epi: 6 /hpf / Bacteria: Negative          RADIOLOGY & ADDITIONAL TESTS:  Results Reviewed:   Imaging Personally Reviewed:  Electrocardiogram Personally Reviewed:    COORDINATION OF CARE:  Care Discussed with Consultants/Other Providers [Y/N]:  Prior or Outpatient Records Reviewed [Y/N]:

## 2022-03-12 NOTE — PROGRESS NOTE ADULT - SUBJECTIVE AND OBJECTIVE BOX
[facility] Division of Kidney Diseases & Hypertension  FOLLOW UP NOTE  [phone]--------------------------------------------------------------------------------  Chief Complaint:Sepsis        24 hour events/subjective: Patient seen & examined. Labs & vitals reviewed. Appears lethagic. Increased secretions. UO in the last 24 hours 1.2L         PAST HISTORY  --------------------------------------------------------------------------------  No significant changes to PMH, PSH, FHx, SHx, unless otherwise noted    ALLERGIES & MEDICATIONS  --------------------------------------------------------------------------------  Allergies    Amiodarone Hydrochloride (Other (Severe))    Intolerances      Standing Inpatient Medications  ascorbic acid 500 milliGRAM(s) Oral daily  cefepime   IVPB 1000 milliGRAM(s) IV Intermittent every 12 hours  chlorhexidine 2% Cloths 1 Application(s) Topical <User Schedule>  cholecalciferol 2000 Unit(s) Oral daily  insulin lispro (ADMELOG) corrective regimen sliding scale   SubCutaneous every 6 hours  methimazole 10 milliGRAM(s) Oral daily  metoprolol succinate ER 50 milliGRAM(s) Oral daily  mexiletine 150 milliGRAM(s) Oral every 8 hours  mirtazapine 7.5 milliGRAM(s) Oral at bedtime  multivitamin 1 Tablet(s) Oral daily  pantoprazole  Injectable 40 milliGRAM(s) IV Push daily  senna 2 Tablet(s) Oral at bedtime  sertraline 100 milliGRAM(s) Oral daily  sodium zirconium cyclosilicate 10 Gram(s) Oral daily  thiamine 100 milliGRAM(s) Oral daily  traMADol 25 milliGRAM(s) Oral once    PRN Inpatient Medications      REVIEW OF SYSTEMS  --------------------------------------------------------------------------------  unable to obtain      VITALS/PHYSICAL EXAM  --------------------------------------------------------------------------------  T(C): 36.8 (03-12-22 @ 09:18), Max: 37.1 (03-11-22 @ 17:49)  HR: 92 (03-12-22 @ 09:18) (88 - 103)  BP: 86/68 (03-11-22 @ 11:31) (86/68 - 86/68)  RR: 16 (03-12-22 @ 09:18) (16 - 18)  SpO2: 98% (03-12-22 @ 09:18) (98% - 100%)  Wt(kg): --        03-11-22 @ 07:01  -  03-12-22 @ 07:00  --------------------------------------------------------  IN: 1315 mL / OUT: 1240 mL / NET: 75 mL      Physical Exam:  	Gen: lethargic, on NC  	HEENT: MMM  	Pulm: CTA B/L  	CV: S1S2  	Abd: Soft, +BS   	Ext: No LE edema B/L  	Neuro: lethargic  	Skin: Warm and dry  	Vascular access:      LABS/STUDIES  --------------------------------------------------------------------------------              8.3    14.28 >-----------<  210      [03-12-22 @ 06:37]              26.6     128  |  93  |  33  ----------------------------<  123      [03-12-22 @ 06:34]  5.0   |  22  |  1.20        Ca     9.8     [03-12-22 @ 06:34]      Mg     2.1     [03-12-22 @ 06:34]      Phos  3.9     [03-11-22 @ 05:43]    TPro  9.8  /  Alb  3.5  /  TBili  0.4  /  DBili  x   /  AST  36  /  ALT  20  /  AlkPhos  170  [03-11-22 @ 05:43]              [03-12-22 @ 06:34]  Serum Osmolality 274      [03-11-22 @ 15:45]    Creatinine Trend:  SCr 1.20 [03-12 @ 06:34]  SCr 1.12 [03-11 @ 11:26]  SCr 1.05 [03-11 @ 09:11]  SCr 1.04 [03-11 @ 05:43]  SCr 0.98 [03-10 @ 06:26]    Urinalysis - [03-11-22 @ 15:45]      Color Light Yellow / Appearance Clear / SG 1.016 / pH 7.0      Gluc Trace / Ketone Negative  / Bili Negative / Urobili Negative       Blood Small / Protein 100 / Leuk Est Small / Nitrite Negative      RBC 33 / WBC 11 / Hyaline 10 / Gran  / Sq Epi  / Non Sq Epi 6 / Bacteria Negative    Urine Creatinine 45      [03-11-22 @ 15:46]  Urine Sodium 69      [03-11-22 @ 15:46]  Urine Potassium 54      [03-11-22 @ 15:46]  Urine Chloride 58      [03-11-22 @ 15:46]  Urine Osmolality 421      [03-11-22 @ 15:46]

## 2022-03-12 NOTE — PROGRESS NOTE ADULT - PROBLEM SELECTOR PLAN 5
check urine and serum osms  consider renal input check urine and serum osms  appreciate renal eval  - will start sodium chlo TID and check TSh and cortisol  - will discuss torsemide with HF team

## 2022-03-12 NOTE — PROGRESS NOTE ADULT - ATTENDING COMMENTS
Pt well known to me.  LVAD  1.  Hyponatremia--SIADH like picture.  Longer acting loop diuretic combined with salt tabs would raise Na and by enhancing distal Na delivery potentially obviate K binder requirement  discussed with CT surgery service

## 2022-03-12 NOTE — PROGRESS NOTE ADULT - ASSESSMENT
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 s/p HM2 LVAD in 9/2017 at  (due to severe PAD) with TV ring, multiple GI bleeds, thus maintained off AC, prior COVID-19 infection in 4/2020, recurrent syncope post LVAD s/p ILR, prolonged complex hospitalization from 6/14-11/16/2021 with GIB, respiratory failure s/p trach decanulated 12/2021, multiple infections, s/p cholecystotomy tube and SMA stent 10/2021, recurrent COVID 19 reinfection s/p MAB/remdesivir/steroids and distributive shock with serratia bacteremia, recurrent VT on mexiletine transferred from [facility] [place] ED with metabolic encephalopathy, hypercarbic respiratory failure weaned off bipap, septic shock requiring pressors (now on midodrine) with serratia bacteremia on cefepime,  episodes of recurrent VT s/p lidocaine gtt now back on mexiletine. Nephrology called for hyponatremia.

## 2022-03-12 NOTE — PROGRESS NOTE ADULT - PROBLEM SELECTOR PLAN 2
resolved shock; treating  serratia bacteremia  requiring pressors titrated off and now on midodrine, stopped.  due to serratia bacteremia-unclear source  bcx 3/2 NTD, afebrile since 3/1, Leukocytosis overall improved   CVP was low despite albumin and IVF in CICU   ?purulent sputum coming out from previous trach site - evaluated by ENT and felt no signs of infection   CXR no PNA  CTH unrevealing  LVAD exit sites shows no signs of infection  Perc dawn tube site shows no signs of infection  CT showed abdominal aortic thrombus (?infected) and a 2.6cm splenic lesion (?abscess)  initially on vancomycin (2/28-3/1) and now on cefepime alone (2/28 - )  ID following  -trend WBC  -cont abx resolved shock; treating serratia bacteremia wit cefepime  requiring pressors titrated off and now on midodrine, stopped.  due to serratia bacteremia-unclear source  bcx 3/2 NTD, afebrile since 3/1, Leukocytosis overall improved   CVP was low despite albumin and IVF in CICU   ?purulent sputum coming out from previous trach site - evaluated by ENT and felt no signs of infection   CXR no PNA  CTH unrevealing  LVAD exit sites shows no signs of infection  Perc dawn tube site shows no signs of infection  CT showed abdominal aortic thrombus (?infected) and a 2.6cm splenic lesion (?abscess)  initially on vancomycin (2/28-3/1) and now on cefepime alone (2/28 - )  ID following  -trend WBC  -cont abx

## 2022-03-12 NOTE — PROGRESS NOTE ADULT - ASSESSMENT
Assessment and Recommendation:   · Assessment	  Assessment and recommendation :  Acute hypercapnic respiratory Failure on 3 liter on nasal cannula    S/P Septic shock   Bacteremia with serratia marcescens  Severe anemia with low H/H   S/P transfuse one unit of Packed RBCs   Severe ischemic cardiomyopathy   ACC/AHA stage D systolic heart failure  Peripheral vascular Disease   lokelma for hyperkalemia   PAF on no ACO   H/O of repeated GI bleeding   S/P mesenteric ischemia   GERD on Reglan   S/P HM2 LVAD , VT on Mexiletine   hyperthyroidism on  methimazole   severe protein caloric malnutrition   hyponatremia fluid restriction   severe neuropathy   Major depression   S/P tracheostomy X2  pan culture blood and urine done   Continue Antibiotic cefepime   NG tube feeding   GI prophylaxis   patient is DNR/DNI

## 2022-03-12 NOTE — CHART NOTE - NSCHARTNOTEFT_GEN_A_CORE
Called by RN, pt had increased coughing, SOB, will hold tube feed for now, check chest x-ray, f/u results D/W Dr Sutherland

## 2022-03-12 NOTE — PROGRESS NOTE ADULT - PROBLEM SELECTOR PLAN 1
suspect secondary to infection with diffuse cerebral dysfunction.   CT head x 2 negative for acute infarct  unable to do MRI d/t LVAD  EEG negative for seizure activity  neuro recs noted  ammonia low, B1 pending  home gabapentin discontinued given lethargy  GOC discussion re: end of life care and feeding tube ongoing  per GI not candidate for PEG  plan for family meeting thrusday with pallaitive suspect secondary to infection with diffuse cerebral dysfunction.   CT head x 2 negative for acute infarct  unable to do MRI d/t LVAD  EEG negative for seizure activity  neuro recs noted  ammonia low, B1 nml  home gabapentin discontinued given lethargy  GOC discussion re: end of life care and feeding tube ongoing  per GI not candidate for PEG  plan for family meeting Thursday with palliative

## 2022-03-12 NOTE — PROGRESS NOTE ADULT - SUBJECTIVE AND OBJECTIVE BOX
RICKY JOINT  MRN#: [id number]  Subjective: pulmonary progress note  : acute hypercapnic respiratory failure . Septic shock , service rendered on 2002   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 s/p HM2 LVAD in 2017 at  (due to severe PAD) with TV ring, multiple GI bleeds, thus maintained off AC, CKD 3prior COVID-19 infection in 2020, recurrent syncope post LVAD s/p ILR, s/p prolonged complex hospitalization from -2021 initially admitted with abdominal pain complicated by GIB, respiratory failure s/p trach, multiple infections, s/p cholecystotomy tube and SMA stent 10/2021, ultimately discharged to [facility] rehab and then returned to [facility] for trach decannulation , readmitted in 2022 with recurrent COVID-19 infection, initially in distributive shock. Blood cultures were also positive for serratia marcescens which he has had in the past. seen by heart failure team patient is DNR/DNI  continue on 3 liter nasal canula flat Affect , hyperkalemia lokelma , runs of V-Tach.         (2022 22:20)    PAST MEDICAL & SURGICAL HISTORY:  CHF (congestive heart failure)    CAD (coronary artery disease)  Depression    Pleural effusion    History of 2019 novel coronavirus disease (COVID-19)  2020    Hemorrhoids    Bleeding hemorrhoids    Peripheral arterial disease    Claudication    BPH with urinary obstruction    ACC/AHA stage D systolic heart failure    Anticoagulation goal of INR 2.0 to 2.5    Falls    Clavicle fracture    CKD (chronic kidney disease), stage III    Iron deficiency anemia    H/O epistaxis    Vertigo    GI bleed    S/P TVR (tricuspid valve repair)    S/P ventricular assist device    S/P endoscopy    H/O tracheostomy        FAMILY HISTORY:    Social History:    Marital Status:  X    (   ) Single    (   )    (  )   Occupation: Retired   Lives with: (  ) alone  (  ) children  X spouse   (  ) parents  (  ) other    Substance Use (street drugs): X never used  (  ) other:  Tobacco Usage: X never smoked   (   ) former smoker   (   ) current smoker  (     ) pack year  (    ) last cigarette date  Alcohol Usage: Social         OBJECTIVE:  ICU Vital Signs Last 24 Hrs  T(C): 37.2 (01 Mar 2022 16:00), Max: 38.4 (01 Mar 2022 09:00)  T(F): 99 (01 Mar 2022 16:00), Max: 101.1 (01 Mar 2022 09:00)  HR: 109 (01 Mar 2022 18:00) (67 - 124)  BP: --  BP(mean): 86 (2022 20:00) (86 - 86)  ABP: 89/74 (01 Mar 2022 18:00) (75/63 - 160/85)  ABP(mean): 81 (01 Mar 2022 18:00) (68 - 139)  RR: 38 (01 Mar 2022 18:00) (5 - 40)  SpO2: 99% (01 Mar 2022 18:00) (94% - 100%)       @ 07: @ 07:00  --------------------------------------------------------  IN: 1060.7 mL / OUT: 1600 mL / NET: -539.3 mL     @ 07: @ 18:22  --------------------------------------------------------  IN: 677.8 mL / OUT: 455 mL / NET: 222.8 mL    PHYSICAL EXAM :Daily Height in cm: 177.8 (2022 21:20)  Elderly frail male on 3 liter nasal cannula    Daily Weight in k.7 (2022 21:20)  HEENT:     + NCAT  + EOMI  - Conjuctival edema   - Icterus   - Thrush   - ETT  - NGT/OGT  Neck:         + FROM RT IJ  JVD     - Nodes     - Masses    + Mid-line trachea   - Tracheostomy  Chest:           mild kyphosis   Lungs:          + CTA  + Rhonchi  + Rales    - Wheezing  + Decreased BS   - Dullness R L  Cardiac:       + S1 + S2    + RRR   - Irregular   - S3  - S4    - Murmurs   - Rub   - Hamman’s sign   Abdomen:    + BS     + Soft    + Non-tender  - Distended  - Organomegaly  - PEG Cholecystostomy tube in place   Extremities:   - Cyanosis U/L   - Clubbing  U/L  - LE/UE Edema   + Capillary refill    + Pulses   Neuro:        + Awake   +  Alert   - Confused   - Lethargic   - Sedated   - Generalized Weakness  Skin:        - Rashes    - Erythema   + Normal incisions   + IV sites intact  -      HOSPITAL MEDICATIONS: All mediciations reviewed and analyzed  MEDICATIONS  (STANDING):  albumin human  5% IVPB 250 milliLiter(s) IV Intermittent once  cefepime   IVPB 2000 milliGRAM(s) IV Intermittent every 12 hours  chlorhexidine 2% Cloths 1 Application(s) Topical <User Schedule>  cholecalciferol 1000 Unit(s) Oral daily  dextrose 40% Gel 15 Gram(s) Oral once  dextrose 50% Injectable 25 Gram(s) IV Push once  gabapentin Solution 100 milliGRAM(s) Oral three times a day  glucagon  Injectable 1 milliGRAM(s) IntraMuscular once  insulin lispro (ADMELOG) corrective regimen sliding scale   SubCutaneous every 6 hours  magnesium sulfate  IVPB 1 Gram(s) IV Intermittent once  methimazole 10 milliGRAM(s) Oral daily  metoclopramide 10 milliGRAM(s) Oral four times a day  mexiletine 150 milliGRAM(s) Oral every 8 hours  mirtazapine 7.5 milliGRAM(s) Oral at bedtime  multivitamin 1 Tablet(s) Oral daily  pantoprazole  Injectable 40 milliGRAM(s) IV Push daily  potassium phosphate IVPB 15 milliMole(s) IV Intermittent once  senna 2 Tablet(s) Oral at bedtime  sertraline 100 milliGRAM(s) Oral daily  vancomycin  IVPB 750 milliGRAM(s) IV Intermittent every 24 hours  vasopressin Infusion 0.02 Unit(s)/Min (1.2 mL/Hr) IV Continuous <Continuous>    MEDICATIONS  (PRN):    LABS: All Lab data reviewed and analyzed                         8.3    14.28 )-----------( 210      ( 12 Mar 2022 06:37 )  03-12    128<L>  |  93<L>  |  33<H>  ----------------------------<  123<H>  5.0   |  22  |  1.20    Ca    9.8      12 Mar 2022 06:34  Phos  3.9     03-11  Mg     2.1     -12    TPro  9.8<H>  /  Alb  3.5  /  TBili  0.4  /  DBili  x   /  AST  36  /  ALT  20  /  AlkPhos  170<H>      Ca    9.4      11 Mar 2022 11:26  Phos  3.9     03-11  Mg     2.0     -    TPro  9.8<H>  /  Alb  3.5  /  TBili  0.4  /  DBili  x   /  AST  36  /  ALT  20  /  AlkPhos  170<H>      Ca    9.3      10 Mar 2022 06:26  Phos  3.0     03-10  Mg     1.6     -10    TPro  8.3  /  Alb  3.0<L>  /  TBili  0.4  /  DBili  x   /  AST  35  /  ALT  23  /  AlkPhos  166<H>      Ca    8.9      08 Mar 2022 04:59  Phos  3.1     03-08  Mg     1.9     -08    TPro  7.8  /  Alb  2.9<L>  /  TBili  0.3  /  DBili  x   /  AST  40  /  ALT  24  /  AlkPhos  156<H>                 PTT - ( 01 Mar 2022 00:08 )  PTT:25.0 sec LIVER FUNCTIONS - ( 01 Mar 2022 17:38 )  Alb: 2.9 g/dL / Pro: 7.3 g/dL / ALK PHOS: 116 U/L / ALT: 30 U/L / AST: 29 U/L / GGT: x           RADIOLOGY: - Reviewed and analyzed

## 2022-03-13 NOTE — PROGRESS NOTE ADULT - PROBLEM SELECTOR PLAN 1
suspect secondary to infection with diffuse cerebral dysfunction.   CT head x 2 negative for acute infarct  unable to do MRI d/t LVAD  EEG negative for seizure activity  neuro recs noted  ammonia low, B1 nml  home gabapentin discontinued given lethargy  GOC discussion re: end of life care and feeding tube ongoing - per GI not candidate for PEG  plan for family meeting Thursday with palliative

## 2022-03-13 NOTE — CHART NOTE - NSCHARTNOTEFT_GEN_A_CORE
CXR results reviewed by writer; Preliminary read with no acute findings.    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 s/p HM2 LVAD in 9/2017 at  (due to severe PAD) with TV ring, multiple GI bleeds, thus maintained off AC, prior COVID-19 infection in 4/2020, recurrent syncope post LVAD s/p ILR, prolonged complex hospitalization from 6/14-11/16/2021 with GIB, respiratory failure s/p trach decanulated 12/2021, multiple infections, s/p cholecystotomy tube and SMA stent 10/2021, recurrent COVID 19 reinfection s/p MAB/remdesivir/steroids and distributive shock with serratia bacteremia, recurrent VT on mexiletine transferred from [facility] ED with metabolic encephalopathy, hypercarbic respiratory failure weaned off bipap, septic shock requiring pressors (now on midodrine) with serratia bacteremia on cefepime, intermittent h/h drop s/p total 3units prbc (last 3/6), episodes of recurrent VT s/p lidocaine gtt now back on mexiletine.     Pt has NGT feeds in setting of Metabolic Encephalopathy and not candidate for PEG as per GI, NGT feeds held 3/13/2022 secondary to pt coughing and with SOB    CXR preliminary results reviewed by writer    Will resume NGT feeds    Provider to RN placed.  Endorse to AM team

## 2022-03-13 NOTE — PROGRESS NOTE ADULT - ASSESSMENT
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 s/p HM2 LVAD in 9/2017 at  (due to severe PAD) with TV ring, multiple GI bleeds, thus maintained off AC, prior COVID-19 infection in 4/2020, recurrent syncope post LVAD s/p ILR, prolonged complex hospitalization from 6/14-11/16/2021 with GIB, respiratory failure s/p trach decanulated 12/2021, multiple infections, s/p cholecystotomy tube and SMA stent 10/2021, recurrent COVID 19 reinfection s/p MAB/remdesivir/steroids and distributive shock with serratia bacteremia, recurrent VT on mexiletine transferred from [facility] ED with metabolic encephalopathy, hypercarbic respiratory failure weaned off bipap, septic shock requiring pressors (now on midodrine) with serratia bacteremia on cefepime, intermittent h/h drop s/p total 3units prbc (last 3/6), episodes of recurrent VT s/p lidocaine gtt now back on mexiletine.

## 2022-03-13 NOTE — PROGRESS NOTE ADULT - PROBLEM SELECTOR PLAN 4
intermittent h/h drop s/p total 3units prbc (last 3/6)  no s/s of active GI bleed  monitor h/h EF 10%  s/p LVAD without s/s of pump malfunction  holding BB and spironolactone given low bp  recurrent NSVT/VT when he first arrived started on lidocaine infusion with improvement now back on mexiletine  HF team following  GOC discussion  keep K>4, Mg>2

## 2022-03-13 NOTE — PROGRESS NOTE ADULT - PROBLEM SELECTOR PLAN 3
EF 10%  s/p LVAD without s/s of pump malfunction  holding BB and spironolactone given low bp  recurrent NSVT/VT when he first arrived started on lidocaine infusion with improvement now back on mexiletine  HF team following  GOC discussion  keep K>4, Mg>2 resolved shock; treating serratia bacteremia wit cefepime  requiring pressors titrated off and now off midodrine  due to serratia bacteremia-unclear source  bcx 3/6 NG, afebrile since 3/1  CVP was low despite albumin and IVF in CICU   ?purulent sputum coming out from previous trach site - evaluated by ENT and felt no signs of infection   CXR no PNA  CTH unrevealing  LVAD exit sites shows no signs of infection  Perc dawn tube site shows no signs of infection  CT showed abdominal aortic thrombus (?infected) and a 2.6cm splenic lesion (?abscess)  initially on vancomycin (2/28-3/1) and now on cefepime alone (2/28 - )  ID following  -trend WBC  -cont abx

## 2022-03-13 NOTE — PROGRESS NOTE ADULT - SUBJECTIVE AND OBJECTIVE BOX
[facility] Division of Hospital Medicine  Cristobal Sutherland  Contact on MS TEAMS    Patient is a 66y old  Male who presents with a chief complaint of Septic shock (13 Mar 2022 14:52)      SUBJECTIVE / OVERNIGHT EVENTS: concerned for SOB/cough overnight, TFs were stopped and CXR showed clear lungs. Resumed TF. LEthargic in AM, opens eyes, nonverbal.   ADDITIONAL REVIEW OF SYSTEMS: unable to assess due no nonverbal.    MEDICATIONS  (STANDING):  ascorbic acid 500 milliGRAM(s) Oral daily  cefepime   IVPB 1000 milliGRAM(s) IV Intermittent every 12 hours  chlorhexidine 2% Cloths 1 Application(s) Topical <User Schedule>  cholecalciferol 2000 Unit(s) Oral daily  insulin lispro (ADMELOG) corrective regimen sliding scale   SubCutaneous every 6 hours  methimazole 10 milliGRAM(s) Oral daily  metoprolol succinate ER 50 milliGRAM(s) Oral daily  mexiletine 150 milliGRAM(s) Oral every 8 hours  mirtazapine 7.5 milliGRAM(s) Oral at bedtime  multivitamin 1 Tablet(s) Oral daily  pantoprazole  Injectable 40 milliGRAM(s) IV Push daily  senna 2 Tablet(s) Oral at bedtime  sertraline 100 milliGRAM(s) Oral daily  sodium chloride 1 Gram(s) Oral three times a day  sodium zirconium cyclosilicate 10 Gram(s) Oral daily  thiamine 100 milliGRAM(s) Oral daily  traMADol 25 milliGRAM(s) Oral once    MEDICATIONS  (PRN):      CAPILLARY BLOOD GLUCOSE      POCT Blood Glucose.: 179 mg/dL (13 Mar 2022 11:58)  POCT Blood Glucose.: 142 mg/dL (13 Mar 2022 06:30)  POCT Blood Glucose.: 89 mg/dL (12 Mar 2022 21:36)  POCT Blood Glucose.: 159 mg/dL (12 Mar 2022 16:58)    I&O's Summary    12 Mar 2022 06:01  -  13 Mar 2022 07:00  --------------------------------------------------------  IN: 0 mL / OUT: 265 mL / NET: -265 mL        PHYSICAL EXAM:  Vital Signs Last 24 Hrs  T(C): 36.6 (13 Mar 2022 13:15), Max: 37.1 (13 Mar 2022 05:15)  T(F): 97.8 (13 Mar 2022 13:15), Max: 98.7 (13 Mar 2022 05:15)  HR: 97 (13 Mar 2022 13:15) (91 - 102)  BP: --  BP(mean): 72 (13 Mar 2022 13:15) (72 - 78)  RR: 18 (13 Mar 2022 05:15) (18 - 18)  SpO2: 100% (13 Mar 2022 05:15) (100% - 100%)  CONSTITUTIONAL: NAD  EYES: PERRLA; conjunctiva and sclera clear  ENMT: Moist oral mucosa, +NGT  NECK: +stoma  RESPIRATORY: Normal respiratory effort; lungs are clear to auscultation bilaterally  CARDIOVASCULAR: mechanical heart sounds; LVAD in place  ABDOMEN: Nontender to palpation, normoactive bowel sounds, no rebound/guarding; No hepatosplenomegaly;   MUSCULOSKELETAL:  Normal gait; no clubbing or cyanosis of digits; no joint swelling or tenderness to palpation  PSYCH: A+O to person, place, and time; affect appropriate  NEUROLOGY: CN 2-12 are intact and symmetric; no gross sensory deficits   SKIN: No rashes; no palpable lesions      LABS:                        8.3    14.28 )-----------( 210      ( 12 Mar 2022 06:37 )             26.6     03-12    128<L>  |  93<L>  |  33<H>  ----------------------------<  123<H>  5.0   |  22  |  1.20    Ca    9.8      12 Mar 2022 06:34  Mg     2.1     03-12            Urinalysis Basic - ( 11 Mar 2022 15:45 )    Color: Light Yellow / Appearance: Clear / S.016 / pH: x  Gluc: x / Ketone: Negative  / Bili: Negative / Urobili: Negative   Blood: x / Protein: 100 / Nitrite: Negative   Leuk Esterase: Small / RBC: 33 /hpf / WBC 11 /HPF   Sq Epi: x / Non Sq Epi: 6 /hpf / Bacteria: Negative          RADIOLOGY & ADDITIONAL TESTS:  < from: Xray Chest 1 View- PORTABLE-Urgent (Xray Chest 1 View- PORTABLE-Urgent .) (22 @ 17:43) >  Impression:  Bibasilar passive atelectasis.    < end of copied text >    Results Reviewed:   Imaging Personally Reviewed:  Electrocardiogram Personally Reviewed:    COORDINATION OF CARE:  Care Discussed with Consultants/Other Providers [Y/N]:  Prior or Outpatient Records Reviewed [Y/N]:

## 2022-03-13 NOTE — PROGRESS NOTE ADULT - SUBJECTIVE AND OBJECTIVE BOX
RICKY JOINT  MRN#: [id number]  Subjective: pulmonary progress note  : acute hypercapnic respiratory failure . Septic shock , service rendered on 2002   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 s/p HM2 LVAD in 2017 at  (due to severe PAD) with TV ring, multiple GI bleeds, thus maintained off AC, CKD 3prior COVID-19 infection in 2020, recurrent syncope post LVAD s/p ILR, s/p prolonged complex hospitalization from -2021 initially admitted with abdominal pain complicated by GIB, respiratory failure s/p trach, multiple infections, s/p cholecystotomy tube and SMA stent 10/2021, ultimately discharged to [facility] rehab and then returned to [facility] for trach decannulation , readmitted in 2022 with recurrent COVID-19 infection, initially in distributive shock. Blood cultures were also positive for serratia marcescens which he has had in the past. seen by heart failure team patient is DNR/DNI  continue on 3 liter nasal canula flat Affect , hyperkalemia lokelma , runs of V-Tach.         (2022 22:20)    PAST MEDICAL & SURGICAL HISTORY:  CHF (congestive heart failure)    CAD (coronary artery disease)  Depression    Pleural effusion    History of 2019 novel coronavirus disease (COVID-19)  2020    Hemorrhoids    Bleeding hemorrhoids    Peripheral arterial disease    Claudication    BPH with urinary obstruction    ACC/AHA stage D systolic heart failure    Anticoagulation goal of INR 2.0 to 2.5    Falls    Clavicle fracture    CKD (chronic kidney disease), stage III    Iron deficiency anemia    H/O epistaxis    Vertigo    GI bleed    S/P TVR (tricuspid valve repair)    S/P ventricular assist device    S/P endoscopy    H/O tracheostomy        FAMILY HISTORY:    Social History:    Marital Status:  X    (   ) Single    (   )    (  )   Occupation: Retired   Lives with: (  ) alone  (  ) children  X spouse   (  ) parents  (  ) other    Substance Use (street drugs): X never used  (  ) other:  Tobacco Usage: X never smoked   (   ) former smoker   (   ) current smoker  (     ) pack year  (    ) last cigarette date  Alcohol Usage: Social         OBJECTIVE:  ICU Vital Signs Last 24 Hrs  T(C): 37.2 (01 Mar 2022 16:00), Max: 38.4 (01 Mar 2022 09:00)  T(F): 99 (01 Mar 2022 16:00), Max: 101.1 (01 Mar 2022 09:00)  HR: 109 (01 Mar 2022 18:00) (67 - 124)  BP: --  BP(mean): 86 (2022 20:00) (86 - 86)  ABP: 89/74 (01 Mar 2022 18:00) (75/63 - 160/85)  ABP(mean): 81 (01 Mar 2022 18:00) (68 - 139)  RR: 38 (01 Mar 2022 18:00) (5 - 40)  SpO2: 99% (01 Mar 2022 18:00) (94% - 100%)       @ 07: @ 07:00  --------------------------------------------------------  IN: 1060.7 mL / OUT: 1600 mL / NET: -539.3 mL     @ 07: @ 18:22  --------------------------------------------------------  IN: 677.8 mL / OUT: 455 mL / NET: 222.8 mL    PHYSICAL EXAM :Daily Height in cm: 177.8 (2022 21:20)  Elderly frail male on 3 liter nasal cannula    Daily Weight in k.7 (2022 21:20)  HEENT:     + NCAT  + EOMI  - Conjuctival edema   - Icterus   - Thrush   - ETT  - NGT/OGT  Neck:         + FROM RT IJ  JVD     - Nodes     - Masses    + Mid-line trachea   - Tracheostomy  Chest:           mild kyphosis   Lungs:          + CTA  + Rhonchi  + Rales    - Wheezing  + Decreased BS   - Dullness R L  Cardiac:       + S1 + S2    + RRR   - Irregular   - S3  - S4    - Murmurs   - Rub   - Hamman’s sign   Abdomen:    + BS     + Soft    + Non-tender  - Distended  - Organomegaly  - PEG Cholecystostomy tube in place   Extremities:   - Cyanosis U/L   - Clubbing  U/L  - LE/UE Edema   + Capillary refill    + Pulses   Neuro:        + Awake   +  Alert   - Confused   - Lethargic   - Sedated   - Generalized Weakness  Skin:        - Rashes    - Erythema   + Normal incisions   + IV sites intact  -      HOSPITAL MEDICATIONS: All mediciations reviewed and analyzed  MEDICATIONS  (STANDING):  albumin human  5% IVPB 250 milliLiter(s) IV Intermittent once  cefepime   IVPB 2000 milliGRAM(s) IV Intermittent every 12 hours  chlorhexidine 2% Cloths 1 Application(s) Topical <User Schedule>  cholecalciferol 1000 Unit(s) Oral daily  dextrose 40% Gel 15 Gram(s) Oral once  dextrose 50% Injectable 25 Gram(s) IV Push once  gabapentin Solution 100 milliGRAM(s) Oral three times a day  glucagon  Injectable 1 milliGRAM(s) IntraMuscular once  insulin lispro (ADMELOG) corrective regimen sliding scale   SubCutaneous every 6 hours  magnesium sulfate  IVPB 1 Gram(s) IV Intermittent once  methimazole 10 milliGRAM(s) Oral daily  metoclopramide 10 milliGRAM(s) Oral four times a day  mexiletine 150 milliGRAM(s) Oral every 8 hours  mirtazapine 7.5 milliGRAM(s) Oral at bedtime  multivitamin 1 Tablet(s) Oral daily  pantoprazole  Injectable 40 milliGRAM(s) IV Push daily  potassium phosphate IVPB 15 milliMole(s) IV Intermittent once  senna 2 Tablet(s) Oral at bedtime  sertraline 100 milliGRAM(s) Oral daily  vancomycin  IVPB 750 milliGRAM(s) IV Intermittent every 24 hours  vasopressin Infusion 0.02 Unit(s)/Min (1.2 mL/Hr) IV Continuous <Continuous>    MEDICATIONS  (PRN):    LABS: All Lab data reviewed and analyzed                        8.3    14.28 )-----------( 210      ( 12 Mar 2022 06:37 )             26.6    03-12    128<L>  |  93<L>  |  33<H>  ----------------------------<  123<H>  5.0   |  22  |  1.20    Ca    9.8      12 Mar 2022 06:34  Mg     2.1     03-12      Ca    9.8      12 Mar 2022 06:34  Phos  3.9     03-11  Mg     2.1     03-12    TPro  9.8<H>  /  Alb  3.5  /  TBili  0.4  /  DBili  x   /  AST  36  /  ALT  20  /  AlkPhos  170<H>      Ca    9.4      11 Mar 2022 11:26  Phos  3.9     03-11  Mg     2.0     03-11    TPro  9.8<H>  /  Alb  3.5  /  TBili  0.4  /  DBili  x   /  AST  36  /  ALT  20  /  AlkPhos  170<H>      Ca    9.3      10 Mar 2022 06:26  Phos  3.0     03-10  Mg     1.6     03-10    TPro  8.3  /  Alb  3.0<L>  /  TBili  0.4  /  DBili  x   /  AST  35  /  ALT  23  /  AlkPhos  166<H>      Ca    8.9      08 Mar 2022 04:59  Phos  3.1     03-08  Mg     1.9     -08    TPro  7.8  /  Alb  2.9<L>  /  TBili  0.3  /  DBili  x   /  AST  40  /  ALT  24  /  AlkPhos  156<H>                 PTT - ( 01 Mar 2022 00:08 )  PTT:25.0 sec LIVER FUNCTIONS - ( 01 Mar 2022 17:38 )  Alb: 2.9 g/dL / Pro: 7.3 g/dL / ALK PHOS: 116 U/L / ALT: 30 U/L / AST: 29 U/L / GGT: x           RADIOLOGY: - Reviewed and analyzed

## 2022-03-13 NOTE — CHART NOTE - NSCHARTNOTEFT_GEN_A_CORE
Labs reviewed.                        8.3    14.28 )-----------( 210      ( 12 Mar 2022 06:37 )             26.6     13 Mar 2022 23:01    133    |  96     |  38     ----------------------------<  139    4.4     |  25     |  1.01     Ca    9.8        13 Mar 2022 23:01  Mg     2.1       12 Mar 2022 06:34    CAPILLARY BLOOD GLUCOSE    POCT Blood Glucose.: 125 mg/dL (13 Mar 2022 21:26)  POCT Blood Glucose.: 179 mg/dL (13 Mar 2022 18:51)  POCT Blood Glucose.: 124 mg/dL (13 Mar 2022 17:13)  POCT Blood Glucose.: 179 mg/dL (13 Mar 2022 11:58)  POCT Blood Glucose.: 142 mg/dL (13 Mar 2022 06:30)      Pt noted to be hyperkalemic to 5.9 3/13/2022. Pt with Hx of Hyperkalemia on Lokelma 10g qd. Pt also with Hx of Chronic Hyponatremia; on NaCl tabs 1g TID, Fluid Restriction 1L/day  Given scheduled Lokelma 10g PO x1, Regular Insulin 10 units IV x1, and D50% 50ml IV x1.  Repeat labs as above with Serum K 4.4 and Hyponatremia improving to 133.    F/u AM Labs  Monitor and treat as needed.  Endorse to AM team

## 2022-03-13 NOTE — PROGRESS NOTE ADULT - PROBLEM SELECTOR PLAN 2
resolved shock; treating serratia bacteremia wit cefepime  requiring pressors titrated off and now off midodrine  due to serratia bacteremia-unclear source  bcx 3/6 NG, afebrile since 3/1  CVP was low despite albumin and IVF in CICU   ?purulent sputum coming out from previous trach site - evaluated by ENT and felt no signs of infection   CXR no PNA  CTH unrevealing  LVAD exit sites shows no signs of infection  Perc dawn tube site shows no signs of infection  CT showed abdominal aortic thrombus (?infected) and a 2.6cm splenic lesion (?abscess)  initially on vancomycin (2/28-3/1) and now on cefepime alone (2/28 - )  ID following  -trend WBC  -cont abx appreciate renal eval  - will eval for adrenal insuff - check AM cortisol  - cont sodium chlo TID  - check bmp today  - will discuss torsemide with HF team

## 2022-03-13 NOTE — PROGRESS NOTE ADULT - PROBLEM SELECTOR PLAN 5
appreciate renal eval  - will eval for adrenal insuff - check AM cortisol  - cont sodium chlo TID  - check bmp today  - will discuss torsemide with HF team intermittent h/h drop s/p total 3units prbc (last 3/6)  no s/s of active GI bleed  monitor h/h

## 2022-03-14 NOTE — PROGRESS NOTE ADULT - ASSESSMENT
66 years old male with PMHx of Stage D HF 2/2 NICM s/p HM2 LVAD in 9/2017 at  (due to severe PAD) with TV ring, multiple GI bleeds, no AC, CKD 3 prior COVID-19 infection in 4/2020, chronic cholecystitis s/p percutaneous cholecystostomy tube, recurrent COVID infection, Serratia bacteremia transferred from [facility] for sepsis.    ID is consulted for septic shock  Recently had Serratia bacteremia discharged on suppressive PO Levaquin  Returned with leukocytosis, fever, AMS, hypotension requiring pressors  ?purulent sputum coming out from previous trach site  CXR no PNA  CTH unrevealing  LVAD exit sites shows no signs of infection  Perc dawn tube site shows no signs of infection  BCx Serratia, S cefepime and ertapenem, R quinolones  CT showed abdominal aortic thrombus and a 2.6cm splenic lesion    Antibiotics:  Vancomycin 2/28 - 3/1  Zosyn 2/28  Cefepime 2/28 -     Currently unclear etiology of septic shock likely secondary to recurrent serratia bacteremia  Source of serratia is could be LVAD, infected aortic thrombus or splenic lesion  Regardless will treat this as in intravascular infection so likely 4 - 6 weeks in total    Detailed discussion with CICU attending Dr. Jensen, patient likely would have worsening respiratory status but due to his multiple comorbidities and previous intubation, it is futile to escalate care to intubation as patient will likely unable to be weaned off ventilator.      IMPRESSION:  Septic shock 2/2 serratia bacteremia  Aortic thrombus  Splenic lesion  Leukocytosis  Fever  LVAD      RECOMMENDATIONS:  - Continue IV cefepime 1gm q12hrs  - Follow up blood cultures show evidence of bacteremia clearance  - mental status improved but not yet at baseline, off pressors and oxygen supplementation  -- will plan a prolonged course of Cefepime for 4 weeks    Morris Prater MD  [phone]  After 5pm/weekends [phone]     66 years old male with PMHx of Stage D HF 2/2 NICM s/p HM2 LVAD in 9/2017 at  (due to severe PAD) with TV ring, multiple GI bleeds, no AC, CKD 3 prior COVID-19 infection in 4/2020, chronic cholecystitis s/p percutaneous cholecystostomy tube, recurrent COVID infection, Serratia bacteremia transferred from [facility] for sepsis.    ID is consulted for septic shock  Recently had Serratia bacteremia discharged on suppressive PO Levaquin  Returned with leukocytosis, fever, AMS, hypotension requiring pressors  ?purulent sputum coming out from previous trach site  CXR no PNA  CTH unrevealing  LVAD exit sites shows no signs of infection  Perc dawn tube site shows no signs of infection  BCx Serratia, S cefepime and ertapenem, R quinolones  CT showed abdominal aortic thrombus and a 2.6cm splenic lesion    Antibiotics:  Vancomycin 2/28 - 3/1  Zosyn 2/28  Cefepime 2/28 -     Currently unclear etiology of septic shock likely secondary to recurrent serratia bacteremia  Source of serratia is could be LVAD, infected aortic thrombus or splenic lesion  Regardless will treat this as in intravascular infection so likely 4 - 6 weeks in total    IMPRESSION:  Septic shock 2/2 serratia bacteremia  Aortic thrombus  Splenic lesion  Leukocytosis  Fever  LVAD      RECOMMENDATIONS:  - Continue IV cefepime 1gm q12hrs  - Follow up blood cultures show evidence of bacteremia clearance    -- will plan a prolonged course of Cefepime for 4-6 weeks  --GOC discussions with family as pt is not eligible for PEG placement ?hospice    Morris Prater MD  [phone]  After 5pm/weekends [phone]

## 2022-03-14 NOTE — PROGRESS NOTE ADULT - ASSESSMENT
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 s/p HM2 LVAD in 9/2017 at  (due to severe PAD) with TV ring, multiple GI bleeds, thus maintained off AC, prior COVID-19 infection in 4/2020, recurrent syncope post LVAD s/p ILR, prolonged complex hospitalization from 6/14-11/16/2021 with GIB, respiratory failure s/p trach decanulated 12/2021, multiple infections, s/p cholecystotomy tube and SMA stent 10/2021, recurrent COVID 19 reinfection s/p MAB/remdesivir/steroids and distributive shock with serratia bacteremia, recurrent VT on mexiletine transferred from [facility] ED with metabolic encephalopathy, hypercarbic respiratory failure weaned off bipap, septic shock requiring pressors (now on midodrine) with serratia bacteremia on cefepime, intermittent h/h drop s/p total 3units prbc (last 3/6), episodes of recurrent VT s/p lidocaine gtt now back on mexiletine.

## 2022-03-14 NOTE — PROGRESS NOTE ADULT - ASSESSMENT
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    S/p prolonged complex hospitalization from 6/14-11/16/2021 initially admitted with abdominal pain complicated by GIB, respiratory failure s/p trach, multiple infections, s/p cholecystotomy tube and SMA stent 10/2021, ultimately discharged to [facility] rehab and then returned to [facility] for trach decannulation 12/2. The patient had a recent admission with COVID 19 reinfection and distributive shock. That admission he had blood culture positive for serratia marcescens (discharged on levaquin).  He was treated with MAB, remdesivir, and steroids. He had recurrent VT and was started mexiletine.     Now coming in from [facility] ED with leukocytosis and AMS was treated for UTI. Initially required bipap but was weaned off here. Labs concerning here for WBC 26, hemoglobin of 7s then 6.7, creatinine of 1.3. His maps were initially in the 50s. Physical exam showing pus from stoma, sat of central access of 81.8, and tachycardia are all concerning for septic shock picture. He is s/p 1L fluids, 1uPRBCs, was placed on vaso. Patient has known episodes of VT and had recurrent NSVT/VT when he first arrived started on lidocaine infusion with improvement. Of note he was previously on mexiletine 150 TID outpatient, metoprolol ER 25 qAM and 50 qPM. Previous history of thyrotoxicosis on amiodarone.    He was found to be bacteremic with recurrent serratia, on IV abx possibly r/t LVAD infection vs splenic abscess noted on CT 1/19. Afebrile since 3/1. s/p 1 unit PRBC 3/6. Vasopressin weaned off 3/4, started on midodrine which has now been off since 3/8. LVAD without s/s of pump malfunction. Currently he opens his eyes however remains nonverbal and not following commands. His mental status is still not back to baseline. His VT has now subsided, last episode was noted on 3/12.  His hyponatremia and hyperkalemia are overall improving after receiving the following treatment insulin/D50 and lokelma.  Overall fairly stable from HF perspective. 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 Will need to arrange for family meeting this week to discuss GOC and next steps.

## 2022-03-14 NOTE — PROGRESS NOTE ADULT - PROBLEM SELECTOR PLAN 5
See my GOC note dated 3/3 and 3/11. However, in summary, GOC are for trying to manage his infection and to continue to do all interventions in order to try to prolong the patient's life and to improve the patient's condition including a PEG if possible; however, his family agreed with DNR/I. Plan if for a f/u FM on Thursday 3/17 likely between 12-2 pm. Cristina will reach out to the patient's son (Kang) and try to see which time is better for him so a time can be confirmed. See my GOC note dated 3/3 and 3/11. However, in summary, GOC are for trying to manage his infection and to continue to do all interventions in order to try to prolong the patient's life and to improve the patient's condition including a PEG if possible; however, his family agreed with DNR/I. Plan if for a f/u FM on Thursday 3/17 likely between 12-2 pm. Cristina will not be able to come but she agreed with a phone or video meeting. She will reach out to the patient's son (aKng) and try to see which time is better for him so a time can be confirmed. Once a time is defined, I will communicate it to the primary team and HF.

## 2022-03-14 NOTE — PROGRESS NOTE ADULT - PROBLEM SELECTOR PLAN 5
- history of thyrotoxicosis with amiodarone, on methimazole  - continue mexiletine (can increase dose if needed) and beta blocker as above  - maintain K 4-4.5 and Mg 2-2.5  - EP following

## 2022-03-14 NOTE — PROGRESS NOTE ADULT - PROBLEM SELECTOR PLAN 2
Likely Multifactorial (2/2 to infection, metabolic issues, hospitalization, depression)  Work up and Rx as per primary team and ID.  If after correcting underlying issues (mainly infectious and metabolic) he continues to have a poor neuro status, may want to consider a f/u CT.

## 2022-03-14 NOTE — PROGRESS NOTE ADULT - SUBJECTIVE AND OBJECTIVE BOX
Patient is a 66y old  Male who presents with a chief complaint of Septic shock (14 Mar 2022 13:22)      INTERVAL History of Present Illness/OVERNIGHT EVENTS: family meeting 3/17    MEDICATIONS  (STANDING):  ascorbic acid 500 milliGRAM(s) Oral daily  cefepime   IVPB 1000 milliGRAM(s) IV Intermittent every 12 hours  chlorhexidine 2% Cloths 1 Application(s) Topical <User Schedule>  cholecalciferol 2000 Unit(s) Oral daily  insulin lispro (ADMELOG) corrective regimen sliding scale   SubCutaneous every 6 hours  methimazole 10 milliGRAM(s) Oral daily  metoprolol succinate ER 50 milliGRAM(s) Oral daily  mexiletine 150 milliGRAM(s) Oral every 8 hours  mirtazapine 7.5 milliGRAM(s) Oral at bedtime  multivitamin 1 Tablet(s) Oral daily  pantoprazole  Injectable 40 milliGRAM(s) IV Push daily  senna 2 Tablet(s) Oral at bedtime  sertraline 100 milliGRAM(s) Oral daily  sodium chloride 1 Gram(s) Oral three times a day  sodium zirconium cyclosilicate 10 Gram(s) Oral daily  thiamine 100 milliGRAM(s) Oral daily  traMADol 25 milliGRAM(s) Oral once    MEDICATIONS  (PRN):      Allergies    Amiodarone Hydrochloride (Other (Severe))    Intolerances        REVIEW OF SYSTEMS:  Negative unless otherwise specified above.    Vital Signs Last 24 Hrs  T(C): 36.8 (14 Mar 2022 05:10), Max: 36.8 (14 Mar 2022 05:10)  T(F): 98.2 (14 Mar 2022 05:10), Max: 98.2 (14 Mar 2022 05:10)  HR: 94 (14 Mar 2022 05:10) (93 - 94)  BP: --  BP(mean): 74 (14 Mar 2022 07:57) (74 - 80)  RR: 18 (14 Mar 2022 05:10) (18 - 18)  SpO2: 100% (14 Mar 2022 05:10) (100% - 100%)        PHYSICAL EXAM:  GENERAL: No apparent distress, appears chronically ill  HEAD:  Atraumatic, Normocephalic  EYES: Conjunctiva and sclera clear, no discharge  ENMT: NGT, no discharge  NECK: Supple, +tracheostomy site dressing  CHEST/LUNG: Air entry bilaterally  HEART: Regular rhythm  ABDOMEN: Soft, Nontender, +PC drain, RLQ driveline  EXTREMITIES:  No clubbing, cyanosis or edema  NERVOUS SYSTEM: Opens eyes, does not follow commands       LABS:                        8.0    10.20 )-----------( 204      ( 14 Mar 2022 07:07 )             25.7     14 Mar 2022 07:09    133    |  97     |  37     ----------------------------<  158    4.7     |  25     |  1.01     Ca    9.7        14 Mar 2022 07:09          CAPILLARY BLOOD GLUCOSE      POCT Blood Glucose.: 116 mg/dL (14 Mar 2022 11:48)  POCT Blood Glucose.: 175 mg/dL (14 Mar 2022 05:53)  POCT Blood Glucose.: 125 mg/dL (13 Mar 2022 21:26)  POCT Blood Glucose.: 179 mg/dL (13 Mar 2022 18:51)  POCT Blood Glucose.: 124 mg/dL (13 Mar 2022 17:13)      RADIOLOGY & ADDITIONAL TESTS:      Images reviewed personally    Consultant Notes Reviewed and Care Discussed with relevant Consultants.

## 2022-03-14 NOTE — PROGRESS NOTE ADULT - SUBJECTIVE AND OBJECTIVE BOX
INFECTIOUS DISEASES FOLLOW UP-- Anitra Prater  [phone]    This is a follow up note for this  66yMale with  Sepsis        ROS:  CONSTITUTIONAL:  No fever, good appetite  CARDIOVASCULAR:  No chest pain or palpitations  RESPIRATORY:  No dyspnea  GASTROINTESTINAL:  No nausea, vomiting, diarrhea, or abdominal pain  GENITOURINARY:  No dysuria  NEUROLOGIC:  No headache,     Allergies    Amiodarone Hydrochloride (Other (Severe))    Intolerances        ANTIBIOTICS/RELEVANT:  antimicrobials  cefepime   IVPB 1000 milliGRAM(s) IV Intermittent every 12 hours    immunologic:    OTHER:  ascorbic acid 500 milliGRAM(s) Oral daily  chlorhexidine 2% Cloths 1 Application(s) Topical <User Schedule>  cholecalciferol 2000 Unit(s) Oral daily  insulin lispro (ADMELOG) corrective regimen sliding scale   SubCutaneous every 6 hours  methimazole 10 milliGRAM(s) Oral daily  metoprolol tartrate 25 milliGRAM(s) Oral two times a day  mexiletine 150 milliGRAM(s) Oral every 8 hours  mirtazapine 7.5 milliGRAM(s) Oral at bedtime  multivitamin 1 Tablet(s) Oral daily  pantoprazole  Injectable 40 milliGRAM(s) IV Push daily  senna 2 Tablet(s) Oral at bedtime  sertraline 100 milliGRAM(s) Oral daily  sodium chloride 1 Gram(s) Oral three times a day  sodium zirconium cyclosilicate 10 Gram(s) Oral daily  thiamine 100 milliGRAM(s) Oral daily  traMADol 25 milliGRAM(s) Oral once      Objective:  Vital Signs Last 24 Hrs  T(C): 36.9 (14 Mar 2022 17:12), Max: 36.9 (14 Mar 2022 17:12)  T(F): 98.5 (14 Mar 2022 17:12), Max: 98.5 (14 Mar 2022 17:12)  HR: 92 (14 Mar 2022 12:00) (92 - 94)  BP: --  BP(mean): 76 (14 Mar 2022 17:12) (74 - 80)  RR: 18 (14 Mar 2022 17:12) (18 - 18)  SpO2: 99% (14 Mar 2022 17:12) (99% - 100%)    PHYSICAL EXAM:  Constitutional:no acute distress  Eyes:ALONSO, EOMI  Ear/Nose/Throat: no oral lesions, 	  Respiratory: clear BL  Cardiovascular: S1S2  Gastrointestinal:soft, (+) BS, no tenderness  Extremities:no e/e/c  No Lymphadenopathy  IV sites not inflammed.  Urine Microscopic-Add On (NC) (03.11.22 @ 15:45)    Red Blood Cell - Urine: 33 /hpf    White Blood Cell - Urine: 11 /HPF    Hyaline Casts: 10 /lpf    Bacteria: Negative    Epithelial Cells: 6 /hpf      LABS:                        8.0    10.20 )-----------( 204      ( 14 Mar 2022 07:07 )             25.7     03-14    133<L>  |  97  |  37<H>  ----------------------------<  158<H>  4.7   |  25  |  1.01    Ca    9.7      14 Mar 2022 07:09            MICROBIOLOGY:    Urine Microscopic-Add On (NC) (03.11.22 @ 15:45)    Red Blood Cell - Urine: 33 /hpf    White Blood Cell - Urine: 11 /HPF    Hyaline Casts: 10 /lpf    Bacteria: Negative    Epithelial Cells: 6 /hpf            RECENT CULTURES:      RADIOLOGY & ADDITIONAL STUDIES:    < from: Xray Chest 1 View- PORTABLE-Urgent (Xray Chest 1 View- PORTABLE-Urgent .) (03.12.22 @ 17:43) >    Impression:  Bibasilar passive atelectasis.    < end of copied text >   INFECTIOUS DISEASES FOLLOW UP-- Anitra Prater  [phone]    This is a follow up note for this  66yMale with  Sepsis  remains non responsive but eyes open  breathing on his own with NG feeding tube in place        ROS:  CONSTITUTIONAL: opens eyes  non interactive    Allergies    Amiodarone Hydrochloride (Other (Severe))    Intolerances        ANTIBIOTICS/RELEVANT:  antimicrobials  cefepime   IVPB 1000 milliGRAM(s) IV Intermittent every 12 hours    immunologic:    OTHER:  ascorbic acid 500 milliGRAM(s) Oral daily  chlorhexidine 2% Cloths 1 Application(s) Topical <User Schedule>  cholecalciferol 2000 Unit(s) Oral daily  insulin lispro (ADMELOG) corrective regimen sliding scale   SubCutaneous every 6 hours  methimazole 10 milliGRAM(s) Oral daily  metoprolol tartrate 25 milliGRAM(s) Oral two times a day  mexiletine 150 milliGRAM(s) Oral every 8 hours  mirtazapine 7.5 milliGRAM(s) Oral at bedtime  multivitamin 1 Tablet(s) Oral daily  pantoprazole  Injectable 40 milliGRAM(s) IV Push daily  senna 2 Tablet(s) Oral at bedtime  sertraline 100 milliGRAM(s) Oral daily  sodium chloride 1 Gram(s) Oral three times a day  sodium zirconium cyclosilicate 10 Gram(s) Oral daily  thiamine 100 milliGRAM(s) Oral daily  traMADol 25 milliGRAM(s) Oral once      Objective:  Vital Signs Last 24 Hrs  T(C): 36.9 (14 Mar 2022 17:12), Max: 36.9 (14 Mar 2022 17:12)  T(F): 98.5 (14 Mar 2022 17:12), Max: 98.5 (14 Mar 2022 17:12)  HR: 92 (14 Mar 2022 12:00) (92 - 94)  BP: --  BP(mean): 76 (14 Mar 2022 17:12) (74 - 80)  RR: 18 (14 Mar 2022 17:12) (18 - 18)  SpO2: 99% (14 Mar 2022 17:12) (99% - 100%)    PHYSICAL EXAM:  Constitutional:no acute distress  Eyes:ALONSO, EOMI  Ear/Nose/Throat: no oral lesions, NG feeding tube	  Respiratory: clear BL  Cardiovascular: S1S2  Gastrointestinal:soft, (+) BS, no tenderness  cholecystotomy tube in palce greenish-brown fluid  Extremities:no e/e/c  No Lymphadenopathy  IV sites not inflammed.  Urine Microscopic-Add On (NC) (03.11.22 @ 15:45)    Red Blood Cell - Urine: 33 /hpf    White Blood Cell - Urine: 11 /HPF    Hyaline Casts: 10 /lpf    Bacteria: Negative    Epithelial Cells: 6 /hpf      LABS:                        8.0    10.20 )-----------( 204      ( 14 Mar 2022 07:07 )             25.7     03-14    133<L>  |  97  |  37<H>  ----------------------------<  158<H>  4.7   |  25  |  1.01    Ca    9.7      14 Mar 2022 07:09            MICROBIOLOGY:    Urine Microscopic-Add On (NC) (03.11.22 @ 15:45)    Red Blood Cell - Urine: 33 /hpf    White Blood Cell - Urine: 11 /HPF    Hyaline Casts: 10 /lpf    Bacteria: Negative    Epithelial Cells: 6 /hpf            RECENT CULTURES:      RADIOLOGY & ADDITIONAL STUDIES:    < from: Xray Chest 1 View- PORTABLE-Urgent (Xray Chest 1 View- PORTABLE-Urgent .) (03.12.22 @ 17:43) >    Impression:  Bibasilar passive atelectasis.    < end of copied text >

## 2022-03-14 NOTE — PROGRESS NOTE ADULT - ASSESSMENT
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 for trach decannulation 12/2, readmitted in 2/2022 with recurrent COVID-19 infection, initially in distributive shock. Blood cultures were also positive for serratia marcescens which he has had in the past.   Pt was transferred to [facility] CICU from [facility]. He was sent to their ED from a rehab center due to AMS and tachycardia. At [facility], they noted a WBC greater than 20, tachycardia and hypercarbia on ABG. Pt was placed on BiPAP and transferred to [facility] CICU for  sepsis vs septic shock. Now on the floors. Geriatrics and Palliative Medicine ([facility]) Team was called for goals of care

## 2022-03-14 NOTE — CONSULT NOTE ADULT - SUBJECTIVE AND OBJECTIVE BOX
Reason For Consult: Hyperthyroidism     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 s/p HM2 LVAD in 9/2017 at  (due to severe PAD) with TV ring, multiple GI bleeds, thus maintained off AC, CKD 3prior COVID-19 infection in 4/2020, recurrent syncope post LVAD s/p ILR, s/p prolonged complex hospitalization from 6/14-11/16/2021 initially admitted with abdominal pain complicated by GIB, respiratory failure s/p trach, multiple infections, s/p cholecystotomy tube and SMA stent 10/2021, ultimately discharged to [facility] rehab and then returned to [facility] for trach decannulation 12/2, readmitted in 2/2022 with recurrent COVID-19 infection, initially in distributive shock. Blood cultures were also positive for serratia marcescens which he has had in the past.     Pt was transferred to [facility] CICU from [facility]. He was sent to their ED from a rehab center due to AMS and tachycardia. At [facility], they noted a WBC greater than 20, tachycardia and hypercarbia on ABG. Pt was placed on BiPAP and transferred to [facility] CICU for concern for sepsis vs septic shock.      (27 Feb 2022 22:20)      PAST MEDICAL & SURGICAL HISTORY:  CHF (congestive heart failure)    CAD (coronary artery disease)    Depression    Pleural effusion    History of 2019 novel coronavirus disease (COVID-19)  april 2020    Hemorrhoids    Bleeding hemorrhoids    Peripheral arterial disease    Claudication    BPH with urinary obstruction    ACC/AHA stage D systolic heart failure    Anticoagulation goal of INR 2.0 to 2.5    Falls    Clavicle fracture    CKD (chronic kidney disease), stage III    Iron deficiency anemia    H/O epistaxis    Vertigo    GI bleed    S/P TVR (tricuspid valve repair)    S/P ventricular assist device    S/P endoscopy    H/O tracheostomy        FAMILY HISTORY:      Social History:    Outpatient Medications:    MEDICATIONS  (STANDING):  ascorbic acid 500 milliGRAM(s) Oral daily  cefepime   IVPB 1000 milliGRAM(s) IV Intermittent every 12 hours  chlorhexidine 2% Cloths 1 Application(s) Topical <User Schedule>  cholecalciferol 2000 Unit(s) Oral daily  insulin lispro (ADMELOG) corrective regimen sliding scale   SubCutaneous every 6 hours  methimazole 10 milliGRAM(s) Oral daily  metoprolol succinate ER 50 milliGRAM(s) Oral daily  mexiletine 150 milliGRAM(s) Oral every 8 hours  mirtazapine 7.5 milliGRAM(s) Oral at bedtime  multivitamin 1 Tablet(s) Oral daily  pantoprazole  Injectable 40 milliGRAM(s) IV Push daily  senna 2 Tablet(s) Oral at bedtime  sertraline 100 milliGRAM(s) Oral daily  sodium chloride 1 Gram(s) Oral three times a day  sodium zirconium cyclosilicate 10 Gram(s) Oral daily  thiamine 100 milliGRAM(s) Oral daily  traMADol 25 milliGRAM(s) Oral once    MEDICATIONS  (PRN):      Allergies    Amiodarone Hydrochloride (Other (Severe))    Intolerances      Review of Systems:  Constitutional: No fever  Eyes: No blurry vision  Neuro: No tremors  HEENT: No pain  Cardiovascular: No chest pain, palpitations  Respiratory: No SOB, no cough  GI: No nausea, vomiting, abdominal pain  : No dysuria  Skin: no rash  Psych: no depression  Endocrine: no polyuria, polydipsia  Hem/lymph: no swelling  Osteoporosis: no fractures    ALL OTHER SYSTEMS REVIEWED AND NEGATIVE    UNABLE TO OBTAIN    PHYSICAL EXAM:  VITALS: T(C): 36.7 (03-14-22 @ 12:00)  T(F): 98 (03-14-22 @ 12:00), Max: 98.2 (03-14-22 @ 05:10)  HR: 92 (03-14-22 @ 12:00) (92 - 94)  BP: --  RR:  (18 - 18)  SpO2:  (100% - 100%)  Wt(kg): --  GENERAL: NAD, well-groomed, well-developed  EYES: No proptosis, no lid lag, anicteric  HEENT:  Atraumatic, Normocephalic, moist mucous membranes  THYROID: Normal size, no palpable nodules  RESPIRATORY: Clear to auscultation bilaterally; No rales, rhonchi, wheezing, or rubs  CARDIOVASCULAR: Regular rate and rhythm; No murmurs; no peripheral edema  GI: Soft, nontender, non distended, normal bowel sounds  SKIN: Dry, intact, No rashes or lesions  MUSCULOSKELETAL: Full range of motion, normal strength  NEURO: sensation intact, extraocular movements intact, no tremor, normal reflexes  PSYCH: Alert and oriented x 3, normal affect, normal mood  CUSHING'S SIGNS: no striae    POCT Blood Glucose.: 116 mg/dL (03-14-22 @ 11:48)  POCT Blood Glucose.: 175 mg/dL (03-14-22 @ 05:53)  POCT Blood Glucose.: 125 mg/dL (03-13-22 @ 21:26)  POCT Blood Glucose.: 179 mg/dL (03-13-22 @ 18:51)  POCT Blood Glucose.: 124 mg/dL (03-13-22 @ 17:13)  POCT Blood Glucose.: 179 mg/dL (03-13-22 @ 11:58)  POCT Blood Glucose.: 142 mg/dL (03-13-22 @ 06:30)  POCT Blood Glucose.: 89 mg/dL (03-12-22 @ 21:36)  POCT Blood Glucose.: 159 mg/dL (03-12-22 @ 16:58)  POCT Blood Glucose.: 164 mg/dL (03-12-22 @ 12:05)  POCT Blood Glucose.: 144 mg/dL (03-12-22 @ 06:28)  POCT Blood Glucose.: 126 mg/dL (03-12-22 @ 00:56)  POCT Blood Glucose.: 136 mg/dL (03-11-22 @ 21:24)  POCT Blood Glucose.: 142 mg/dL (03-11-22 @ 17:29)                            8.0    10.20 )-----------( 204      ( 14 Mar 2022 07:07 )             25.7       03-14    133<L>  |  97  |  37<H>  ----------------------------<  158<H>  4.7   |  25  |  1.01    EGFR if : x   EGFR if non : x     Ca    9.7      03-14  Mg     2.1     03-12        Thyroid Function Tests:  03-14 @ 09:18 TSH 13.50 FreeT4 -- T3 42 Anti TPO -- Anti Thyroglobulin Ab -- TSI --  03-13 @ 08:57 TSH 12.70 FreeT4 -- T3 -- Anti TPO -- Anti Thyroglobulin Ab -- TSI --              Radiology:              Reason For Consult: Hyperthyroidism     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 s/p HM2 LVAD in 9/2017 at  (due to severe PAD) with TV ring, multiple GI bleeds, thus maintained off AC, CKD 3prior COVID-19 infection in 4/2020, recurrent syncope post LVAD s/p ILR, s/p prolonged complex hospitalization from 6/14-11/16/2021 initially admitted with abdominal pain complicated by GIB, respiratory failure s/p trach, multiple infections, s/p cholecystotomy tube and SMA stent 10/2021, ultimately discharged to [facility] and then returned to [facility] for trach decannulation 12/2, readmitted in 2/2022 with recurrent COVID-19 infection, initially in distributive shock.  Endocrine team consulted for hyperthyroidism.  Pt is on methimazole 10 mg daily.  Most recent TFT: TSH 13.50, no Ft4 and TT3 low at 42.  Pt is non-verbal, so unable to obtain any history or do ROS.      Thyroid history:   Pt was see by Endocrine team during last admission in 11/2021 for management of hyperthyroidism in the setting of recent amiodarone use (last use 8/7) and multiple IV contrast studies (7/28, 8/13, 9/12, 9/26, 10/5).  TSI and TSHrAb (done in August 2021) were negative, while TPO abs was mildly positive.  There was a concern for hot nodule, but unable to get thyroid u/s due to tracheostomy.  Pt was managed on MMI and steroid taper after thyrotoxicosis was not resolving with methimazole solely.   Before this, pt did not have a previous hyperthyroid history.      PAST MEDICAL & SURGICAL HISTORY:  CHF (congestive heart failure)    CAD (coronary artery disease)    Depression    Pleural effusion    History of 2019 novel coronavirus disease (COVID-19)  april 2020    Hemorrhoids    Bleeding hemorrhoids    Peripheral arterial disease    Claudication    BPH with urinary obstruction    ACC/AHA stage D systolic heart failure    Anticoagulation goal of INR 2.0 to 2.5    Falls    Clavicle fracture    CKD (chronic kidney disease), stage III    Iron deficiency anemia    H/O epistaxis    Vertigo    GI bleed    S/P TVR (tricuspid valve repair)    S/P ventricular assist device    S/P endoscopy    H/O tracheostomy        FAMILY HISTORY:      Social History:    Outpatient Medications:    MEDICATIONS  (STANDING):  ascorbic acid 500 milliGRAM(s) Oral daily  cefepime   IVPB 1000 milliGRAM(s) IV Intermittent every 12 hours  chlorhexidine 2% Cloths 1 Application(s) Topical <User Schedule>  cholecalciferol 2000 Unit(s) Oral daily  insulin lispro (ADMELOG) corrective regimen sliding scale   SubCutaneous every 6 hours  methimazole 10 milliGRAM(s) Oral daily  metoprolol succinate ER 50 milliGRAM(s) Oral daily  mexiletine 150 milliGRAM(s) Oral every 8 hours  mirtazapine 7.5 milliGRAM(s) Oral at bedtime  multivitamin 1 Tablet(s) Oral daily  pantoprazole  Injectable 40 milliGRAM(s) IV Push daily  senna 2 Tablet(s) Oral at bedtime  sertraline 100 milliGRAM(s) Oral daily  sodium chloride 1 Gram(s) Oral three times a day  sodium zirconium cyclosilicate 10 Gram(s) Oral daily  thiamine 100 milliGRAM(s) Oral daily  traMADol 25 milliGRAM(s) Oral once    MEDICATIONS  (PRN):      Allergies    Amiodarone Hydrochloride (Other (Severe))    Intolerances      Review of Systems:  Constitutional: No fever  Eyes: No blurry vision  Neuro: No tremors  HEENT: No pain  Cardiovascular: No chest pain, palpitations  Respiratory: No SOB, no cough  GI: No nausea, vomiting, abdominal pain  : No dysuria  Skin: no rash  Psych: no depression  Endocrine: no polyuria, polydipsia  Hem/lymph: no swelling  Osteoporosis: no fractures    ALL OTHER SYSTEMS REVIEWED AND NEGATIVE    UNABLE TO OBTAIN    PHYSICAL EXAM:  VITALS: T(C): 36.7 (03-14-22 @ 12:00)  T(F): 98 (03-14-22 @ 12:00), Max: 98.2 (03-14-22 @ 05:10)  HR: 92 (03-14-22 @ 12:00) (92 - 94)  BP: --  RR:  (18 - 18)  SpO2:  (100% - 100%)  Wt(kg): --  GENERAL: NAD, well-groomed, well-developed  EYES: No proptosis, no lid lag, anicteric  HEENT:  Atraumatic, Normocephalic, moist mucous membranes  THYROID: Normal size, no palpable nodules  RESPIRATORY: Clear to auscultation bilaterally; No rales, rhonchi, wheezing, or rubs  CARDIOVASCULAR: Regular rate and rhythm; No murmurs; no peripheral edema  GI: Soft, nontender, non distended, normal bowel sounds  SKIN: Dry, intact, No rashes or lesions  MUSCULOSKELETAL: Full range of motion, normal strength  NEURO: sensation intact, extraocular movements intact, no tremor, normal reflexes  PSYCH: Alert and oriented x 3, normal affect, normal mood  CUSHING'S SIGNS: no striae    POCT Blood Glucose.: 116 mg/dL (03-14-22 @ 11:48)  POCT Blood Glucose.: 175 mg/dL (03-14-22 @ 05:53)  POCT Blood Glucose.: 125 mg/dL (03-13-22 @ 21:26)  POCT Blood Glucose.: 179 mg/dL (03-13-22 @ 18:51)  POCT Blood Glucose.: 124 mg/dL (03-13-22 @ 17:13)  POCT Blood Glucose.: 179 mg/dL (03-13-22 @ 11:58)  POCT Blood Glucose.: 142 mg/dL (03-13-22 @ 06:30)  POCT Blood Glucose.: 89 mg/dL (03-12-22 @ 21:36)  POCT Blood Glucose.: 159 mg/dL (03-12-22 @ 16:58)  POCT Blood Glucose.: 164 mg/dL (03-12-22 @ 12:05)  POCT Blood Glucose.: 144 mg/dL (03-12-22 @ 06:28)  POCT Blood Glucose.: 126 mg/dL (03-12-22 @ 00:56)  POCT Blood Glucose.: 136 mg/dL (03-11-22 @ 21:24)  POCT Blood Glucose.: 142 mg/dL (03-11-22 @ 17:29)                            8.0    10.20 )-----------( 204      ( 14 Mar 2022 07:07 )             25.7       03-14    133<L>  |  97  |  37<H>  ----------------------------<  158<H>  4.7   |  25  |  1.01    EGFR if : x   EGFR if non : x     Ca    9.7      03-14  Mg     2.1     03-12        Thyroid Function Tests:  03-14 @ 09:18 TSH 13.50 FreeT4 -- T3 42 Anti TPO -- Anti Thyroglobulin Ab -- TSI --  03-13 @ 08:57 TSH 12.70 FreeT4 -- T3 -- Anti TPO -- Anti Thyroglobulin Ab -- TSI --              Radiology:              Reason For Consult: Hyperthyroidism     HPI:  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 s/p HM2 LVAD in 9/2017 at  (due to severe PAD) with TV ring, multiple GI bleeds, thus maintained off AC, CKD 3prior COVID-19 infection in 4/2020, recurrent syncope post LVAD s/p ILR, s/p prolonged complex hospitalization from 6/14-11/16/2021 initially admitted with abdominal pain complicated by GIB, respiratory failure s/p trach, multiple infections, s/p cholecystotomy tube and SMA stent 10/2021, ultimately discharged to [facility] and then returned to [facility] for trach decannulation 12/2, readmitted in 2/27/2022 with recurrent COVID-19 infection, initially in distributive shock.  Endocrine team consulted on 3/14 for hyperthyroidism management.  Pt is on methimazole 10 mg daily.  Most recent TFT: TSH 13.50, no Ft4 and TT3 low at 42.  Pt is non-verbal, so unable to obtain any history or do ROS.      Thyroid history:   Pt was see by Endocrine team during last admission in 8/2021 for management of hyperthyroidism in the setting of recent amiodarone use (last use 8/7) and multiple IV contrast studies (7/28, 8/13, 9/12, 9/26, 10/5).  TSI and TSHrAb (done in August 2021) were negative, while TPO abs was mildly positive.  There was a question for possible hot nodule, but unable to get thyroid u/s due to tracheostomy.  Pt was managed on MMI and steroid taper after thyrotoxicosis was not resolving with methimazole solely.   Before this admission, pt did not have a prior hyperthyroid history.      PAST MEDICAL & SURGICAL HISTORY:  CHF (congestive heart failure)    CAD (coronary artery disease)    Depression    Pleural effusion    History of 2019 novel coronavirus disease (COVID-19)  april 2020    Hemorrhoids    Bleeding hemorrhoids    Peripheral arterial disease    Claudication    BPH with urinary obstruction    ACC/AHA stage D systolic heart failure    Anticoagulation goal of INR 2.0 to 2.5    Falls    Clavicle fracture    CKD (chronic kidney disease), stage III    Iron deficiency anemia    H/O epistaxis    Vertigo    GI bleed    S/P TVR (tricuspid valve repair)    S/P ventricular assist device    S/P endoscopy    H/O tracheostomy    FAMILY HISTORY: unable to obtain     Social History: from rehab.  unable to obtain     Outpatient Medications:  MEDICATIONS  (STANDING):  ascorbic acid 500 milliGRAM(s) Oral daily  cefepime   IVPB 1000 milliGRAM(s) IV Intermittent every 12 hours  chlorhexidine 2% Cloths 1 Application(s) Topical <User Schedule>  cholecalciferol 2000 Unit(s) Oral daily  insulin lispro (ADMELOG) corrective regimen sliding scale   SubCutaneous every 6 hours  methimazole 10 milliGRAM(s) Oral daily  metoprolol succinate ER 50 milliGRAM(s) Oral daily  mexiletine 150 milliGRAM(s) Oral every 8 hours  mirtazapine 7.5 milliGRAM(s) Oral at bedtime  multivitamin 1 Tablet(s) Oral daily  pantoprazole  Injectable 40 milliGRAM(s) IV Push daily  senna 2 Tablet(s) Oral at bedtime  sertraline 100 milliGRAM(s) Oral daily  sodium chloride 1 Gram(s) Oral three times a day  sodium zirconium cyclosilicate 10 Gram(s) Oral daily  thiamine 100 milliGRAM(s) Oral daily  traMADol 25 milliGRAM(s) Oral once    Allergies  Amiodarone Hydrochloride (Other (Severe))    Review of Systems:  UNABLE TO OBTAIN    PHYSICAL EXAM:  VITALS: T(C): 36.7 (03-14-22 @ 12:00)  T(F): 98 (03-14-22 @ 12:00), Max: 98.2 (03-14-22 @ 05:10)  HR: 92 (03-14-22 @ 12:00) (92 - 94)  BP: --  RR:  (18 - 18)  SpO2:  (100% - 100%)  Wt(kg): --  GENERAL: NAD, thin male   EYES: No proptosis, no injection  HEENT:  Atraumatic, Normocephalic, moist mucous membranes  THYROID: unable to examine as there is dressing   RESPIRATORY: Clear to auscultation bilaterally; No rales, rhonchi, wheezing, or rubs  CARDIOVASCULAR: Regular rate and rhythm; No murmurs; no peripheral edema  GI: Soft, nontender, non distended, normal bowel sounds  CUSHING'S SIGNS: no striae    POCT Blood Glucose.: 116 mg/dL (03-14-22 @ 11:48)  POCT Blood Glucose.: 175 mg/dL (03-14-22 @ 05:53)  POCT Blood Glucose.: 125 mg/dL (03-13-22 @ 21:26)  POCT Blood Glucose.: 179 mg/dL (03-13-22 @ 18:51)  POCT Blood Glucose.: 124 mg/dL (03-13-22 @ 17:13)  POCT Blood Glucose.: 179 mg/dL (03-13-22 @ 11:58)  POCT Blood Glucose.: 142 mg/dL (03-13-22 @ 06:30)  POCT Blood Glucose.: 89 mg/dL (03-12-22 @ 21:36)  POCT Blood Glucose.: 159 mg/dL (03-12-22 @ 16:58)  POCT Blood Glucose.: 164 mg/dL (03-12-22 @ 12:05)  POCT Blood Glucose.: 144 mg/dL (03-12-22 @ 06:28)  POCT Blood Glucose.: 126 mg/dL (03-12-22 @ 00:56)  POCT Blood Glucose.: 136 mg/dL (03-11-22 @ 21:24)  POCT Blood Glucose.: 142 mg/dL (03-11-22 @ 17:29)                        8.0    10.20 )-----------( 204      ( 14 Mar 2022 07:07 )             25.7     03-14    133<L>  |  97  |  37<H>  ----------------------------<  158<H>  4.7   |  25  |  1.01    EGFR if : x   EGFR if non : x     Ca    9.7      03-14  Mg     2.1     03-12    Thyroid Function Tests:  03-14 @ 09:18 TSH 13.50 FreeT4 -- T3 42 Anti TPO -- Anti Thyroglobulin Ab -- TSI --  03-13 @ 08:57 TSH 12.70 FreeT4 -- T3 -- Anti TPO -- Anti Thyroglobulin Ab -- TSI --

## 2022-03-14 NOTE — PROGRESS NOTE ADULT - PROBLEM SELECTOR PLAN 4
- Speed 9200  - Daily LDH, currently stable  - MAP goal 70-80; continue Toprol as stated above  - no AC given history of GIB

## 2022-03-14 NOTE — PROGRESS NOTE ADULT - SUBJECTIVE AND OBJECTIVE BOX
Patient is a 66y old  Male who presents with a chief complaint of Septic shock (14 Mar 2022 18:19)       INTERVAL HPI/OVERNIGHT EVENTS:  Patient seen and evaluated at bedside.  Pt is resting comfortable in NAD.    Allergies    Amiodarone Hydrochloride (Other (Severe))    Intolerances        Vital Signs Last 24 Hrs  T(C): 36.9 (14 Mar 2022 17:12), Max: 36.9 (14 Mar 2022 17:12)  T(F): 98.5 (14 Mar 2022 17:12), Max: 98.5 (14 Mar 2022 17:12)  HR: 92 (14 Mar 2022 12:00) (92 - 94)  BP: --  BP(mean): 76 (14 Mar 2022 17:12) (74 - 80)  RR: 18 (14 Mar 2022 17:12) (18 - 18)  SpO2: 99% (14 Mar 2022 17:12) (99% - 100%)    LABS:                        8.0    10.20 )-----------( 204      ( 14 Mar 2022 07:07 )             25.7     03-14    133<L>  |  97  |  37<H>  ----------------------------<  158<H>  4.7   |  25  |  1.01    Ca    9.7      14 Mar 2022 07:09          CAPILLARY BLOOD GLUCOSE      POCT Blood Glucose.: 188 mg/dL (14 Mar 2022 17:08)  POCT Blood Glucose.: 116 mg/dL (14 Mar 2022 11:48)  POCT Blood Glucose.: 175 mg/dL (14 Mar 2022 05:53)  POCT Blood Glucose.: 125 mg/dL (13 Mar 2022 21:26)      Lower Extremity Physical Exam:  Vasular: DP/PT 0/4, B/L, CFT <3 seconds B/L, Temperature gradient wnl, B/L.   Neuro: Epicritic sensation intact to the level of foot, B/L.  Musculoskeletal/Ortho: painful contracted hammertoes 2-5 bilat  Skin: right heel DTI present on admission

## 2022-03-14 NOTE — PROGRESS NOTE ADULT - ATTENDING COMMENTS
67 y/o M with HM3 LVAD, well known to our service. Admitted with sepsis and AMS. Found to have recurrent bacteremia, now on appropriate antibiotics. Remains non-verbal, intermittently opening eyes and squeezing hands, but not consistent.   For now, continue treatment of bacteremia. Source of bacteremia is LVAD. Appreciate ID involvement.  Had been having runs of NSVT, improved on beta-blocker therapy and continued Mexiletine.   Dietician following for optimization of TF . Electrolytes improved.    Ongoing discussions with Palliative Care and family with respect to goals of care. Family meeting on Thursday. Currently being fed through NG tube, GI deferring PEG placement for now.     Prognosis guarded.

## 2022-03-14 NOTE — PROGRESS NOTE ADULT - SUBJECTIVE AND OBJECTIVE BOX
Subjective: Patient seen and examined resting in bed. ON no issues. Remains non-verbal     Medications:  ascorbic acid 500 milliGRAM(s) Oral daily  cefepime   IVPB 1000 milliGRAM(s) IV Intermittent every 12 hours  chlorhexidine 2% Cloths 1 Application(s) Topical <User Schedule>  cholecalciferol 2000 Unit(s) Oral daily  insulin lispro (ADMELOG) corrective regimen sliding scale   SubCutaneous every 6 hours  methimazole 10 milliGRAM(s) Oral daily  metoprolol succinate ER 50 milliGRAM(s) Oral daily  mexiletine 150 milliGRAM(s) Oral every 8 hours  mirtazapine 7.5 milliGRAM(s) Oral at bedtime  multivitamin 1 Tablet(s) Oral daily  pantoprazole  Injectable 40 milliGRAM(s) IV Push daily  senna 2 Tablet(s) Oral at bedtime  sertraline 100 milliGRAM(s) Oral daily  sodium chloride 1 Gram(s) Oral three times a day  sodium zirconium cyclosilicate 10 Gram(s) Oral daily  thiamine 100 milliGRAM(s) Oral daily  traMADol 25 milliGRAM(s) Oral once    Vitals:  Vital Signs Last 24 Hours  T(C): 36.8 (22 @ 05:10), Max: 36.8 (22 @ 05:10)  HR: 94 (22 @ 05:10) (93 - 94)  BP: --  RR: 18 (22 @ 05:10) (18 - 18)  SpO2: 100% (22 @ 05:10) (100% - 100%)    Weight in k.2 ( @ 08:06)    I&O's Summary    13 Mar 2022 07:01  -  14 Mar 2022 07:00  --------------------------------------------------------  IN: 0 mL / OUT: 300 mL / NET: -300 mL      Physical Exam  General: No distress.   Neck: Neck supple. JVP not elevated. No masses  Chest: Clear to auscultation bilaterally  CV: +VAD hum  Abdomen: Soft, non-distended, non-tender, +keotube   Neurology: opens eyes but remains non verbal     LVAD Interrogation  VAD TYPE HM 3  Speed 9200  Flow 4.9  Power 5.2   PI 5.8  Assessment of driveline exit site: driveline dressing c/d/i  Programming changes: no changes made    Labs:                        8.0    10.20 )-----------( 204      ( 14 Mar 2022 07:07 )             25.7     03-14    133<L>  |  97  |  37<H>  ----------------------------<  158<H>  4.7   |  25  |  1.01    Ca    9.7      14 Mar 2022 07:09            Serum Pro-Brain Natriuretic Peptide: 67083 pg/mL ( @ 04:57)      Lactate Dehydrogenase, Serum: 230 U/L ( @ 06:34)

## 2022-03-14 NOTE — PROGRESS NOTE ADULT - PROBLEM SELECTOR PLAN 1
On tube feeds  Due to increasing oropharyngeal secretions, consider to decrease feeding rate to 30ml/hr or lower as a trial to see if secretions can be better controlled.   Aspiration precautions.   See the independent [facility] note that I entered on 3/11 On tube feeds  Due to increasing oropharyngeal secretions, consider to decrease feeding rate to 30ml/hr or lower as a trial to see if secretions can be better controlled.   Aspiration precautions.   See the independent [facility] note that I entered on 3/11; however, in summary, to be evaluated for a PEG, he needs to have his metabolic and electrolyte issues corrected, his arrhythmias need to be controlled, and he needs clearance from ID and HF. Hence, getting a PEG is unlikely. Anyway, his HCP wants to continue NGT feeds hopping he will improve and become a candidate for a PEG. Since NGT feeds are not a long term solution, on Thursday, a f/u FM will be done to f/u on GOC.

## 2022-03-14 NOTE — CONSULT NOTE ADULT - ASSESSMENT
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 s/p HM2 LVAD in 9/2017 at  (due to severe PAD) with TV ring, multiple GI bleeds, thus maintained off AC, CKD 3prior COVID-19 infection in 4/2020, recurrent syncope post LVAD s/p ILR, s/p prolonged complex hospitalization from 6/14-11/16/2021 initially admitted with abdominal pain complicated by GIB, respiratory failure s/p trach, multiple infections, s/p cholecystotomy tube and SMA stent 10/2021, ultimately discharged to [facility] rehab and then returned to [facility] for trach decannulation 12/2, readmitted in 2/27/2022 with recurrent COVID-19 infection, initially in distributive shock.  Endocrine team consulted on 3/14 for hyperthyroidism management.  Pt is on methimazole 10 mg daily.  Most recent TFT: TSH 13.50, no Ft4 and TT3 low at 42.  Pt was dx with thyrotoxicosis in the setting of amiodarone use and multiple IV contrast studies in 11/2021 and has been on methimazole since.  TSI and TSHrAb were negative.      # Hyperthyroidism dx in 8/2021 after use of amiodarone (last use 8/2021) and multiple IV contrast   - Prior to 8/2021, TSH level were within normal range.  TSI and TSHrAb were negative in 8/2021.  Unable to get thyroid u/s due to tracheostomy.  TPO mildly positive in 8/2021.  It seems that pt has been on methimazole since 8/2021, and TSH done during this admission suggest elevated TSH, no freeT4 but suppressed total t3.  Please check FT4.   - It appears that thyrotoxicosis from amiodarone and multiple IV contrast have resolved, would recommend stopping methimazole and rechecking FT4 and TT3 in 1 week if pt is still admitted (3/22).    - pt is non-verbal, so monitor for any thyrotoxicosis signs, such as tachycardia and diarrhea      *Please note a different provider maybe managing this patient tomorrow/daily*. Please contact our office at [phone] regarding follow-up queries about this patient. For any endocrine emergencies, please state urgency when calling [phone] during business hours and to speak with on-call fellow after 5pm and on weekends.    Laurel Alexander MD  Pager: 9AM - 5PM (Mon-Fri): [phone]  After 5PM and on Weekends: [phone]     For nonurgent matters, please email [email or web address] for assistance.

## 2022-03-14 NOTE — PROGRESS NOTE ADULT - PROBLEM SELECTOR PLAN 1
2/28 BCx + serratia/GNR  - IV abx per ID. currently on cefepime.  - CT C/A/P showing small bilateral pleural effusions, small volume ascites, which is new, decreased splenic lesion, aortic thrombus narrowing the lumen at least 505 at the infrarenal segment, increased from prior  - home mirtazapine and gabapentin discontinued given lethargy  - appreciate palliative care input regarding GOC and PEG placement. Will need to arrange family meeting this week to discuss GOC, tentatively schedule for Thursday 3/17  - will follow ID recs regarding PICC line placement

## 2022-03-14 NOTE — PROGRESS NOTE ADULT - SUBJECTIVE AND OBJECTIVE BOX
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 s/p HM2 LVAD in 9/2017 at  (due to severe PAD) with TV ring, multiple GI bleeds, thus maintained off AC, CKD 3prior COVID-19 infection in 4/2020, recurrent syncope post LVAD s/p ILR, s/p prolonged complex hospitalization from 6/14-11/16/2021 initially admitted with abdominal pain complicated by GIB, respiratory failure s/p trach, multiple infections, s/p cholecystotomy tube and SMA stent 10/2021, ultimately discharged to [facility] rehab and then returned to [facility] for trach decannulation 12/2, readmitted in 2/2022 with recurrent COVID-19 infection, initially in distributive shock. Blood cultures were also positive for serratia marcescens which he has had in the past.     Pt was transferred to [facility] CICU from [facility]. He was sent to their ED from a rehab center due to AMS and tachycardia. At [facility], they noted a WBC greater than 20, tachycardia and hypercarbia on ABG. Pt was placed on BiPAP and transferred to [facility] CICU for concern for sepsis vs septic shock.      (27 Feb 2022 22:20)    Collateral info was gathered from the patient's sister (HCP) that indicated, in the [place], the patient also had a fall. However, CT Head dated 2/28 is negative for acute events.     3/1 The patient was lethargic, non verbal, and not able to f/u commands. He was having mild respiratory distress.   3/3 Though more alert, he is still lethargic, not able to f/u commands, and non verbal. He is still on pressors and with a mild degree of respiratory distress.   3/9 The patient was more alert but still lethargic and not really able to express and understand about his medical issues or the risks and benefits of his Rxs. He denied pain or dyspnea. He appeared to be depressed. He is extremely weak and debilitated.    3/10 The patient was deeply lethargic and barely able to open his eyes. He was not following up any commands. He did not appear to be on any acute distress.   3/11 Overnight with respiratory distress and episodes of Vtach. When evaluated he was more alert but still not able to follow up commands or to communicate.   3/14 The patient remains non verbal and not able to f/u any commands. As per his RN, he has been having increased oropharyngeal secretions. He did not appear to be having any pain or respiratory distress.     PERTINENT PM/SXH:   No pertinent past medical history    CHF (congestive heart failure)    CAD (coronary artery disease)    Depression    Pleural effusion    History of 2019 novel coronavirus disease (COVID-19)    Hemorrhoids    Bleeding hemorrhoids    Peripheral arterial disease    Claudication    BPH with urinary obstruction    ACC/AHA stage D systolic heart failure    Anticoagulation goal of INR 2.0 to 2.5    Falls    Clavicle fracture    CKD (chronic kidney disease), stage III    Iron deficiency anemia    H/O epistaxis    Vertigo    GI bleed      No significant past surgical history    S/P TVR (tricuspid valve repair)    S/P ventricular assist device    S/P endoscopy    H/O tracheostomy      FAMILY HISTORY:    ITEMS NOT CHECKED ARE NOT PRESENT    SOCIAL HISTORY:   Significant other/partner[ ]  Children[x ]  Adventist/Spirituality:  Substance hx:  [ ]   Tobacco hx:  [ ]   Alcohol hx: [ ]   Home Opioid hx:  [ ] I-Stop Reference No:  Living Situation: [ ]Home  [ ]Long term care  [ ]Rehab [ ]Other    ADVANCE DIRECTIVES:    DNR  MOLST  [ ]  Living Will  [ ]   DECISION MAKER(s):  [x ] Health Care Proxy(s)  [ ] Surrogate(s)  [ ] Guardian           Name(s): Phone Number(s): Cristina Rudolph [phone]    BASELINE (I)ADL(s) (prior to admission):  Fonda: [ ]Total  [ ] Moderate [ x]Dependent    Allergies    Amiodarone Hydrochloride (Other (Severe))    Intolerances    MEDICATIONS  (STANDING):  ascorbic acid 500 milliGRAM(s) Oral daily  cefepime   IVPB 1000 milliGRAM(s) IV Intermittent every 12 hours  chlorhexidine 2% Cloths 1 Application(s) Topical <User Schedule>  cholecalciferol 2000 Unit(s) Oral daily  insulin lispro (ADMELOG) corrective regimen sliding scale   SubCutaneous every 6 hours  methimazole 10 milliGRAM(s) Oral daily  metoprolol succinate ER 50 milliGRAM(s) Oral daily  mexiletine 150 milliGRAM(s) Oral every 8 hours  mirtazapine 7.5 milliGRAM(s) Oral at bedtime  multivitamin 1 Tablet(s) Oral daily  pantoprazole  Injectable 40 milliGRAM(s) IV Push daily  senna 2 Tablet(s) Oral at bedtime  sertraline 100 milliGRAM(s) Oral daily  sodium chloride 1 Gram(s) Oral three times a day  sodium zirconium cyclosilicate 10 Gram(s) Oral daily  thiamine 100 milliGRAM(s) Oral daily  traMADol 25 milliGRAM(s) Oral once    MEDICATIONS  (PRN):    PRESENT SYMPTOMS: [x ]Unable to obtain due to poor mentation   Source if other than patient:  [ ]Family   [ ]Team     Pain: [ ]yes [x ]no  QOL impact -   Location -                    Aggravating factors -  Quality -  Radiation -  Timing-  Severity (0-10 scale):  Minimal acceptable level (0-10 scale):     CPOT:    https://www.[facility].org/getattachment/[email or web address]-[email or web address]-[email or web address]-5557n1770i6e/Critical-Care-Pain-Observation-Tool-(CPOT)      PAIN AD Score: 0    http://geriatrictoolkit.[facility]/cog/painad.pdf (press ctrl +  left click to view)  RDOS scale (https://pubmed.ncbi.nlm.nih.gov/89213485/, https://www.[employer]/doi/10.1089/jpm.2009.0229?url_ver=Z39.&rfr_id=mala:rid:crossref.org&rfr_dat=cr_pub%20%200pubmed)    A score of 0 to 2 signifies little or no respiratory distress, 3 signifies mild distress, scores 4 to 6 indicate moderate distress, and scores greater than 7 signify severe distress  RDOS score: 0    Dyspnea:                           [ ]Mild [ ]Moderate [ ]Severe  Anxiety:                             [ ]Mild [ ]Moderate [ ]Severe  Fatigue:                             [ ]Mild [ ]Moderate [ ]Severe  Nausea:                             [ ]Mild [ ]Moderate [ ]Severe  Loss of appetite:              [ ]Mild [ ]Moderate [ ]Severe  Constipation:                    [ ]Mild [ ]Moderate [ ]Severe    Other Symptoms:  [ ]All other review of systems negative     Palliative Performance Status Version 2: 20        %    http://npcrc.org/files/news/palliative_performance_scale_ppsv2.pdf  PHYSICAL EXAM:  Vital Signs Last 24 Hrs  T(C): 36.7 (14 Mar 2022 12:00), Max: 36.8 (14 Mar 2022 05:10)  T(F): 98 (14 Mar 2022 12:00), Max: 98.2 (14 Mar 2022 05:10)  HR: 92 (14 Mar 2022 12:00) (92 - 94)  BP: --  BP(mean): 78 (14 Mar 2022 12:00) (74 - 80)  RR: 18 (14 Mar 2022 12:00) (18 - 18)  SpO2: 100% (14 Mar 2022 12:00) (100% - 100%)    GENERAL:  [ ]Alert  [ ]Oriented x   [x ]Lethargic  [ ]Cachexia  [ ]Unarousable  [ ]Verbal  [x ]Non-Verbal  Behavioral:   [ ] Anxiety  [ ] Delirium [ ] Agitation [x ] Other: Encephalopathic   HEENT:  [ ]Normal   [ x]Dry mouth   [ ]ET Tube/Trach  [ ]Oral lesions  PULMONARY:   [ ]Clear [ ]Tachypnea  [ ]Audible excessive secretions   [ ]Rhonchi        [ ]Right [ ]Left [ ]Bilateral  [ ]Crackles        [ ]Right [ ]Left [ ]Bilateral  [ ]Wheezing     [ ]Right [ ]Left [ ]Bilateral  [x ]Diminished breath sounds [ ]right [ ]left [x ]bilateral  CARDIOVASCULAR:    [x ]Regular [ ]Irregular [ ]Tachy  [ ]Jose [ ]Murmur [ ]Other  [x] LVAD hum  GASTROINTESTINAL:  [ ]Soft  [ ]Distended   [ ]+BS  [ ]Non tender [ ]Tender  [ ]PEG [x ]OGT/ NGT  Last BM: 3/9  GENITOURINARY:  [ ]Normal [x ] Incontinent   [ ]Oliguria/Anuria   [ ]Landrum  MUSCULOSKELETAL:   [ ]Normal   [x ]Weakness  [ ]Bed/Wheelchair bound [ ]Edema  NEUROLOGIC:   [ ]No focal deficits  [ x]Cognitive impairment  [ ]Dysphagia [ ]Dysarthria [ ]Paresis [ ]Other   SKIN:   [ ]Normal    [ ]Rash  [ ]Pressure ulcer(s)       Present on admission [ ]y [ ]n  [x] BL Heel and R Buttock DTI   CRITICAL CARE:  [ ] Shock Present  [ ]Septic [ ]Cardiogenic [ ]Neurologic [ ]Hypovolemic  [ ]  Vasopressors [ ]  Inotropes   [ ]Respiratory failure present [ ]Mechanical ventilation [ ]Non-invasive ventilatory support [ ]High flow    [ ]Acute  [ ]Chronic [ ]Hypoxic  [ ]Hypercarbic [ ]Other  [ ]Other organ failure     LABS:                                              8.0    10.20 )-----------( 204      ( 14 Mar 2022 07:07 )             25.7   03-14    133<L>  |  97  |  37<H>  ----------------------------<  158<H>  4.7   |  25  |  1.01    Ca    9.7      14 Mar 2022 07:09    RADIOLOGY & ADDITIONAL STUDIES:  < from: Xray Chest 1 View- PORTABLE-Urgent (Xray Chest 1 View- PORTABLE-Urgent .) (03.11.22 @ 10:07) >  IMPRESSION:  Left basilar passive atelectasis.    --- End of Report ---          ANITHA NORMAN MD; Resident Radiologist  This document has been electronically signed.  KOBI MAYEN MD; Attending Radiologist  This document has been electronically signed. Mar 11 2022  3:09PM    < end of copied text >    PROTEIN CALORIE MALNUTRITION PRESENT: [ ]mild [ ]moderate [ ]severe [ ]underweight [ ]morbid obesity  https://www.andeal.org/vault/2440/web/files/ONC/Table_Clinical%20Characteristics%20to%20Document%20Malnutrition-White%20JV%20et%20al%202012.pdf    Height (cm): 177.8 (02-27-22 @ 21:20), 182.9 (12-13-21 @ 04:30), 165.1 (12-02-21 @ 13:55)  Weight (kg): 48.7 (02-27-22 @ 21:20), 52.6 (01-12-22 @ 12:50), 53.6 (12-02-21 @ 13:55)  BMI (kg/m2): 15.4 (02-27-22 @ 21:20), 15.7 (01-12-22 @ 12:50), 16 (12-13-21 @ 04:30)    [ ]PPSV2 < or = to 30% [ ]significant weight loss  [ ]poor nutritional intake  [ ]anasarca      [ ]Artificial Nutrition      REFERRALS:   [ ]Chaplaincy  [ ]Hospice  [ ]Child Life  [ ]Social Work  [ ]Case management [ ]Holistic Therapy     Goals of Care Document:

## 2022-03-14 NOTE — PROGRESS NOTE ADULT - PROBLEM SELECTOR PLAN 3
- continue Toprol XL 50 mg PO QD  - holding carlos given hyperkalemia. Remains on Lokelma 10mg daily.  - maintain I=O

## 2022-03-14 NOTE — PROGRESS NOTE ADULT - PROBLEM SELECTOR PLAN 3
resolved shock; treating serratia bacteremia wit cefepime  requiring pressors titrated off and now off midodrine  due to serratia bacteremia-unclear source  bcx 3/6 NG, afebrile since 3/1  CVP was low despite albumin and IVF in CICU   ?purulent sputum coming out from previous trach site - evaluated by ENT and felt no signs of infection   CXR no PNA  CTH unrevealing  LVAD exit sites shows no signs of infection  Perc dawn tube site shows no signs of infection  CT showed abdominal aortic thrombus (?infected) and a 2.6cm splenic lesion (?abscess)  initially on vancomycin (2/28-3/1) and now on cefepime alone (2/28 - )  ID following  -trend WBC  -cont abx

## 2022-03-14 NOTE — PROGRESS NOTE ADULT - SUBJECTIVE AND OBJECTIVE BOX
RICKY JOINT  MRN#: [id number]  Subjective: pulmonary progress note  : acute hypercapnic respiratory failure . Septic shock , service rendered on 2002   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 s/p HM2 LVAD in 2017 at  (due to severe PAD) with TV ring, multiple GI bleeds, thus maintained off AC, CKD 3prior COVID-19 infection in 2020, recurrent syncope post LVAD s/p ILR, s/p prolonged complex hospitalization from -2021 initially admitted with abdominal pain complicated by GIB, respiratory failure s/p trach, multiple infections, s/p cholecystotomy tube and SMA stent 10/2021, ultimately discharged to [facility] and then returned to [facility] for trach decannulation , readmitted in 2022 with recurrent COVID-19 infection, initially in distributive shock. Blood cultures were also positive for serratia marcescens which he has had in the past. seen by heart failure team patient is DNR/DNI  continue on 3 liter nasal canula flat Affect , hyperkalemia lokelma , runs of V-Tach. more stable  very weak          (2022 22:20)    PAST MEDICAL & SURGICAL HISTORY:  CHF (congestive heart failure)    CAD (coronary artery disease)  Depression    Pleural effusion    History of 2019 novel coronavirus disease (COVID-19)  2020    Hemorrhoids    Bleeding hemorrhoids    Peripheral arterial disease    Claudication    BPH with urinary obstruction    ACC/AHA stage D systolic heart failure    Anticoagulation goal of INR 2.0 to 2.5    Falls    Clavicle fracture    CKD (chronic kidney disease), stage III    Iron deficiency anemia    H/O epistaxis    Vertigo    GI bleed    S/P TVR (tricuspid valve repair)    S/P ventricular assist device    S/P endoscopy    H/O tracheostomy        FAMILY HISTORY:    Social History:    Marital Status:  X    (   ) Single    (   )    (  )   Occupation: Retired   Lives with: (  ) alone  (  ) children  X spouse   (  ) parents  (  ) other    Substance Use (street drugs): X never used  (  ) other:  Tobacco Usage: X never smoked   (   ) former smoker   (   ) current smoker  (     ) pack year  (    ) last cigarette date  Alcohol Usage: Social         OBJECTIVE:  ICU Vital Signs Last 24 Hrs  T(C): 37.2 (01 Mar 2022 16:00), Max: 38.4 (01 Mar 2022 09:00)  T(F): 99 (01 Mar 2022 16:00), Max: 101.1 (01 Mar 2022 09:00)  HR: 109 (01 Mar 2022 18:00) (67 - 124)  BP: --  BP(mean): 86 (2022 20:00) (86 - 86)  ABP: 89/74 (01 Mar 2022 18:00) (75/63 - 160/85)  ABP(mean): 81 (01 Mar 2022 18:00) (68 - 139)  RR: 38 (01 Mar 2022 18:00) (5 - 40)  SpO2: 99% (01 Mar 2022 18:00) (94% - 100%)       @ 07: @ 07:00  --------------------------------------------------------  IN: 1060.7 mL / OUT: 1600 mL / NET: -539.3 mL     @ 07: @ 18:22  --------------------------------------------------------  IN: 677.8 mL / OUT: 455 mL / NET: 222.8 mL    PHYSICAL EXAM :Daily Height in cm: 177.8 (2022 21:20)  Elderly frail male on 3 liter nasal cannula    Daily Weight in k.7 (2022 21:20)  HEENT:     + NCAT  + EOMI  - Conjuctival edema   - Icterus   - Thrush   - ETT  - NGT/OGT  Neck:         + FROM RT IJ  JVD     - Nodes     - Masses    + Mid-line trachea   - Tracheostomy  Chest:           mild kyphosis   Lungs:          + CTA  + Rhonchi  + Rales    - Wheezing  + Decreased BS   - Dullness R L  Cardiac:       + S1 + S2    + RRR   - Irregular   - S3  - S4    - Murmurs   - Rub   - Hamman’s sign   Abdomen:    + BS     + Soft    + Non-tender  - Distended  - Organomegaly  - PEG Cholecystostomy tube in place   Extremities:   - Cyanosis U/L   - Clubbing  U/L  - LE/UE Edema   + Capillary refill    + Pulses   Neuro:        + Awake   +  Alert   - Confused   - Lethargic   - Sedated   - Generalized Weakness  Skin:        - Rashes    - Erythema   + Normal incisions   + IV sites intact  -      HOSPITAL MEDICATIONS: All mediciations reviewed and analyzed  MEDICATIONS  (STANDING):  albumin human  5% IVPB 250 milliLiter(s) IV Intermittent once  cefepime   IVPB 2000 milliGRAM(s) IV Intermittent every 12 hours  chlorhexidine 2% Cloths 1 Application(s) Topical <User Schedule>  cholecalciferol 1000 Unit(s) Oral daily  dextrose 40% Gel 15 Gram(s) Oral once  dextrose 50% Injectable 25 Gram(s) IV Push once  gabapentin Solution 100 milliGRAM(s) Oral three times a day  glucagon  Injectable 1 milliGRAM(s) IntraMuscular once  insulin lispro (ADMELOG) corrective regimen sliding scale   SubCutaneous every 6 hours  magnesium sulfate  IVPB 1 Gram(s) IV Intermittent once  methimazole 10 milliGRAM(s) Oral daily  metoclopramide 10 milliGRAM(s) Oral four times a day  mexiletine 150 milliGRAM(s) Oral every 8 hours  mirtazapine 7.5 milliGRAM(s) Oral at bedtime  multivitamin 1 Tablet(s) Oral daily  pantoprazole  Injectable 40 milliGRAM(s) IV Push daily  potassium phosphate IVPB 15 milliMole(s) IV Intermittent once  senna 2 Tablet(s) Oral at bedtime  sertraline 100 milliGRAM(s) Oral daily  vancomycin  IVPB 750 milliGRAM(s) IV Intermittent every 24 hours  vasopressin Infusion 0.02 Unit(s)/Min (1.2 mL/Hr) IV Continuous <Continuous>    MEDICATIONS  (PRN):    LABS: All Lab data reviewed and analyzed                       8.3    14.28 )-----------( 210      ( 12 Mar 2022 06:37 )             26.6    03-12    128<L>  |  93<L>  |  33<H>  ----------------------------<  123<H>  5.0   |  22  |  1.20    Ca    9.8      12 Mar 2022 06:34  Mg     2.1     03-12      Ca    9.8      12 Mar 2022 06:34  Phos  3.9     03-11  Mg     2.1     03-12    TPro  9.8<H>  /  Alb  3.5  /  TBili  0.4  /  DBili  x   /  AST  36  /  ALT  20  /  AlkPhos  170<H>  03    Ca    9.4      11 Mar 2022 11:26  Phos  3.9     03-11  Mg     2.0     03-11    TPro  9.8<H>  /  Alb  3.5  /  TBili  0.4  /  DBili  x   /  AST  36  /  ALT  20  /  AlkPhos  170<H>      Ca    9.3      10 Mar 2022 06:26  Phos  3.0     03-10  Mg     1.6     -10    TPro  8.3  /  Alb  3.0<L>  /  TBili  0.4  /  DBili  x   /  AST  35  /  ALT  23  /  AlkPhos  166<H>  -    Ca    8.9      08 Mar 2022 04:59  Phos  3.1     03-08  Mg     1.9     -08    TPro  7.8  /  Alb  2.9<L>  /  TBili  0.3  /  DBili  x   /  AST  40  /  ALT  24  /  AlkPhos  156<H>  -               PTT - ( 01 Mar 2022 00:08 )  PTT:25.0 sec LIVER FUNCTIONS - ( 01 Mar 2022 17:38 )  Alb: 2.9 g/dL / Pro: 7.3 g/dL / ALK PHOS: 116 U/L / ALT: 30 U/L / AST: 29 U/L / GGT: x           RADIOLOGY: - Reviewed and analyzed

## 2022-03-15 NOTE — PROGRESS NOTE ADULT - PROBLEM SELECTOR PLAN 1
Mild Chronic asymptomatic Hyponatremia- ADH mediated  Pt has had a history of chronic hyponatremia in the past.   Most recently his Na corrected to 135 on 3/8, trended down to 127.   Pt recieved multiple rounds of IVF since admission. On abx with D5.  Appears euvolemic on exam.   Last Posm 281 & Uosm 585 in december 2021.   repeat  Ludy 69, [id number], SOsm 274  Continue fluid restrict to <1L/day for now.   Increase PO solute intake.   Continue sodium chloride tabs 1 gm tid with torsemide 5mg PO daily  Monitor SNa daily. SNa 136 today  May continue with zoloft  Avoid hypotonic fluids & NS (unless for resuscitation) for now    Will sign off. Please call us back if needed Mild Chronic asymptomatic Hyponatremia- ADH mediated  Pt has had a history of chronic hyponatremia in the past.   Most recently his Na corrected to 135 on 3/8, trended down to 127.   Pt recieved multiple rounds of IVF since admission. On abx with D5.  Appears euvolemic on exam.   Last Posm 281 & Uosm 585 in december 2021.   repeat  Ludy 69, [id number], SOsm 274  Continue fluid restrict to <1L/day for now.   Increase PO solute intake.   Continue sodium chloride tabs 1 gm tid. decrease dose of torsemide to 5mg PO daily  Monitor SNa daily. SNa 136 today  May continue with zoloft  Avoid hypotonic fluids & NS (unless for resuscitation) for now    Will sign off. Please call us back if needed

## 2022-03-15 NOTE — PROGRESS NOTE ADULT - PROBLEM SELECTOR PLAN 1
suspect secondary to infection with diffuse cerebral dysfunction.   CT head x 2 negative for acute infarct  unable to do MRI d/t LVAD  EEG negative for seizure activity  neuro recs noted  ammonia low, B1 nml  home gabapentin discontinued given lethargy  GOC discussion re: end of life care and feeding tube ongoing - per GI not candidate for PEG  plan for family meeting Thursday with palliative - in person meeting would be best, however, HCP may join over video call.

## 2022-03-15 NOTE — PROGRESS NOTE ADULT - ASSESSMENT
67 y/o M with complicated medical history, s/p tracheostomy decannulation on 12/2. ENT called back to evaluate stoma. Pt with 3mm tracheocutaneous fistula, remains patent without surrounding erythema or discharge. Pt breathing comfortably on room air.  65 y/o M with complicated medical history, s/p tracheostomy decannulation on 12/2. ENT called back to evaluate stoma. Pt with 3mm tracheocutaneous fistula, remains patent without surrounding erythema or discharge. Pt breathing comfortably on nasal cannula.

## 2022-03-15 NOTE — PROGRESS NOTE ADULT - SUBJECTIVE AND OBJECTIVE BOX
[facility] Division of Kidney Diseases & Hypertension  FOLLOW UP NOTE  [phone]--------------------------------------------------------------------------------  Chief Complaint:Sepsis        24 hour events/subjective: Patient seen & examined. Labs & vitals reviewed. Appears lethargic, opens eyes, non verbal. SNa 136 today        PAST HISTORY  --------------------------------------------------------------------------------  No significant changes to PMH, PSH, FHx, SHx, unless otherwise noted    ALLERGIES & MEDICATIONS  --------------------------------------------------------------------------------  Allergies    Amiodarone Hydrochloride (Other (Severe))    Intolerances      Standing Inpatient Medications  ascorbic acid 500 milliGRAM(s) Oral daily  cefepime   IVPB 1000 milliGRAM(s) IV Intermittent every 12 hours  chlorhexidine 2% Cloths 1 Application(s) Topical <User Schedule>  cholecalciferol 2000 Unit(s) Oral daily  insulin lispro (ADMELOG) corrective regimen sliding scale   SubCutaneous every 6 hours  methimazole 10 milliGRAM(s) Oral daily  metoprolol tartrate 25 milliGRAM(s) Oral two times a day  mexiletine 150 milliGRAM(s) Oral every 8 hours  mirtazapine 7.5 milliGRAM(s) Oral at bedtime  multivitamin 1 Tablet(s) Oral daily  pantoprazole  Injectable 40 milliGRAM(s) IV Push daily  senna 2 Tablet(s) Oral at bedtime  sertraline 100 milliGRAM(s) Oral daily  sodium chloride 1 Gram(s) Oral three times a day  sodium zirconium cyclosilicate 10 Gram(s) Oral daily  thiamine 100 milliGRAM(s) Oral daily  traMADol 25 milliGRAM(s) Oral once    PRN Inpatient Medications      REVIEW OF SYSTEMS  --------------------------------------------------------------------------------  unable to obtain    VITALS/PHYSICAL EXAM  --------------------------------------------------------------------------------  T(C): 36.8 (03-14-22 @ 20:01), Max: 36.9 (03-14-22 @ 17:12)  HR: 88 (03-14-22 @ 20:01) (88 - 92)  BP: --  RR: 18 (03-14-22 @ 20:01) (18 - 18)  SpO2: 98% (03-14-22 @ 20:01) (98% - 100%)  Wt(kg): --        03-14-22 @ 07:01  -  03-15-22 @ 07:00  --------------------------------------------------------  IN: 0 mL / OUT: 505 mL / NET: -505 mL      Physical Exam:  	Gen: lethargic, on NC  	HEENT: MMM  	Pulm: CTA B/L  	CV: S1S2  	Abd: Soft, +BS   	Ext: No LE edema B/L  	Neuro: lethargic  	Skin: Warm and dry  	Vascular access:    LABS/STUDIES  --------------------------------------------------------------------------------              7.2    11.58 >-----------<  198      [03-15-22 @ 06:08]              23.5     136  |  99  |  43  ----------------------------<  152      [03-15-22 @ 06:08]  4.1   |  26  |  1.04        Ca     9.8     [03-15-22 @ 06:08]      Mg     2.0     [03-15-22 @ 06:08]      Phos  2.7     [03-15-22 @ 06:08]    TPro  9.3  /  Alb  3.3  /  TBili  0.3  /  DBili  x   /  AST  30  /  ALT  21  /  AlkPhos  210  [03-15-22 @ 06:08]          Creatinine Trend:  SCr 1.04 [03-15 @ 06:08]  SCr 1.01 [03-14 @ 07:09]  SCr 1.01 [03-13 @ 23:01]  SCr 1.02 [03-13 @ 16:45]  SCr 1.20 [03-12 @ 06:34]    Urinalysis - [03-11-22 @ 15:45]      Color Light Yellow / Appearance Clear / SG 1.016 / pH 7.0      Gluc Trace / Ketone Negative  / Bili Negative / Urobili Negative       Blood Small / Protein 100 / Leuk Est Small / Nitrite Negative      RBC 33 / WBC 11 / Hyaline 10 / Gran  / Sq Epi  / Non Sq Epi 6 / Bacteria Negative    Urine Creatinine 45      [03-11-22 @ 15:46]  Urine Sodium 69      [03-11-22 @ 15:46]  Urine Potassium 54      [03-11-22 @ 15:46]  Urine Chloride 58      [03-11-22 @ 15:46]  Urine Osmolality 421      [03-11-22 @ 15:46]    TSH 13.50      [03-14-22 @ 09:18]

## 2022-03-15 NOTE — PROGRESS NOTE ADULT - PROBLEM SELECTOR PLAN 1
2/28 BCx + serratia/GNR  - IV abx per ID. currently on cefepime.  - CT C/A/P showing small bilateral pleural effusions, small volume ascites, which is new, decreased splenic lesion, aortic thrombus narrowing the lumen at least 505 at the infrarenal segment, increased from prior  - home mirtazapine and gabapentin discontinued given lethargy  - appreciate palliative care/GI  input regarding GOC and PEG placement. Currently patient is not a candidate for PEG placement. Family meeting arranged for this week to discuss GOC, schedule for Thursday 3/17  - will follow ID recs regarding PICC line placement

## 2022-03-15 NOTE — PROGRESS NOTE ADULT - ATTENDING COMMENTS
67 y/o M with HM3 LVAD, well known to our service. Admitted with sepsis and AMS. Found to have recurrent bacteremia, now on appropriate antibiotics. Remains non-verbal, intermittently opening eyes and squeezing hands, but not consistent.   Send log files for concerns for power spikes.   For now, continue treatment of bacteremia. Source of bacteremia is LVAD. Appreciate ID involvement.  Had been having runs of NSVT, improved on beta-blocker therapy and continued Mexiletine.   Dietician following for optimization of TF . Electrolytes improved.    Ongoing discussions with Palliative Care and family with respect to goals of care. Family meeting on Thursday. Currently being fed through NG tube, GI deferring PEG placement for now.     Prognosis guarded.

## 2022-03-15 NOTE — PROGRESS NOTE ADULT - SUBJECTIVE AND OBJECTIVE BOX
RICKY JOINT  MRN#: [id number]  Subjective: pulmonary progress note  : acute hypercapnic respiratory failure . Septic shock , service rendered on 2002   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 s/p HM2 LVAD in 2017 at  (due to severe PAD) with TV ring, multiple GI bleeds, thus maintained off AC, CKD 3prior COVID-19 infection in 2020, recurrent syncope post LVAD s/p ILR, s/p prolonged complex hospitalization from -2021 initially admitted with abdominal pain complicated by GIB, respiratory failure s/p trach, multiple infections, s/p cholecystotomy tube and SMA stent 10/2021, ultimately discharged to [facility] and then returned to [facility] for trach decannulation , readmitted in 2022 with recurrent COVID-19 infection, initially in distributive shock. Blood cultures were also positive for serratia marcescens which he has had in the past. seen by heart failure team patient is DNR/DNI  continue on 3 liter nasal canula flat Affect , hyperkalemia lokelma , runs of V-Tach. more stable  very weak , non verbal non communicative          (2022 22:20)    PAST MEDICAL & SURGICAL HISTORY:  CHF (congestive heart failure)    CAD (coronary artery disease)  Depression    Pleural effusion    History of 2019 novel coronavirus disease (COVID-19)  2020    Hemorrhoids    Bleeding hemorrhoids    Peripheral arterial disease    Claudication    BPH with urinary obstruction    ACC/AHA stage D systolic heart failure    Anticoagulation goal of INR 2.0 to 2.5    Falls    Clavicle fracture    CKD (chronic kidney disease), stage III    Iron deficiency anemia    H/O epistaxis    Vertigo    GI bleed    S/P TVR (tricuspid valve repair)    S/P ventricular assist device    S/P endoscopy    H/O tracheostomy        FAMILY HISTORY:    Social History:    Marital Status:  X    (   ) Single    (   )    (  )   Occupation: Retired   Lives with: (  ) alone  (  ) children  X spouse   (  ) parents  (  ) other    Substance Use (street drugs): X never used  (  ) other:  Tobacco Usage: X never smoked   (   ) former smoker   (   ) current smoker  (     ) pack year  (    ) last cigarette date  Alcohol Usage: Social         OBJECTIVE:  ICU Vital Signs Last 24 Hrs  T(C): 37.2 (01 Mar 2022 16:00), Max: 38.4 (01 Mar 2022 09:00)  T(F): 99 (01 Mar 2022 16:00), Max: 101.1 (01 Mar 2022 09:00)  HR: 109 (01 Mar 2022 18:00) (67 - 124)  BP: --  BP(mean): 86 (2022 20:00) (86 - 86)  ABP: 89/74 (01 Mar 2022 18:00) (75/63 - 160/85)  ABP(mean): 81 (01 Mar 2022 18:00) (68 - 139)  RR: 38 (01 Mar 2022 18:00) (5 - 40)  SpO2: 99% (01 Mar 2022 18:00) (94% - 100%)       @ 07: @ 07:00  --------------------------------------------------------  IN: 1060.7 mL / OUT: 1600 mL / NET: -539.3 mL     @ 07:01   @ 18:22  --------------------------------------------------------  IN: 677.8 mL / OUT: 455 mL / NET: 222.8 mL    PHYSICAL EXAM :Daily Height in cm: 177.8 (2022 21:20)  Elderly frail male on 3 liter nasal cannula    Daily Weight in k.7 (2022 21:20)  HEENT:     + NCAT  + EOMI  - Conjuctival edema   - Icterus   - Thrush   - ETT  - NGT/OGT  Neck:         + FROM RT IJ  JVD     - Nodes     - Masses    + Mid-line trachea   - Tracheostomy  Chest:           mild kyphosis   Lungs:          + CTA  + Rhonchi  + Rales    - Wheezing  + Decreased BS   - Dullness R L  Cardiac:       + S1 + S2    + RRR   - Irregular   - S3  - S4    - Murmurs   - Rub   - Hamman’s sign   Abdomen:    + BS     + Soft    + Non-tender  - Distended  - Organomegaly  - PEG Cholecystostomy tube in place   Extremities:   - Cyanosis U/L   - Clubbing  U/L  - LE/UE Edema   + Capillary refill    + Pulses   Neuro:        + Awake   +  Alert   - Confused   - Lethargic   - Sedated   - Generalized Weakness  Skin:        - Rashes    - Erythema   + Normal incisions   + IV sites intact  -      HOSPITAL MEDICATIONS: All mediciations reviewed and analyzed  MEDICATIONS  (STANDING):  albumin human  5% IVPB 250 milliLiter(s) IV Intermittent once  cefepime   IVPB 2000 milliGRAM(s) IV Intermittent every 12 hours  chlorhexidine 2% Cloths 1 Application(s) Topical <User Schedule>  cholecalciferol 1000 Unit(s) Oral daily  dextrose 40% Gel 15 Gram(s) Oral once  dextrose 50% Injectable 25 Gram(s) IV Push once  gabapentin Solution 100 milliGRAM(s) Oral three times a day  glucagon  Injectable 1 milliGRAM(s) IntraMuscular once  insulin lispro (ADMELOG) corrective regimen sliding scale   SubCutaneous every 6 hours  magnesium sulfate  IVPB 1 Gram(s) IV Intermittent once  methimazole 10 milliGRAM(s) Oral daily  metoclopramide 10 milliGRAM(s) Oral four times a day  mexiletine 150 milliGRAM(s) Oral every 8 hours  mirtazapine 7.5 milliGRAM(s) Oral at bedtime  multivitamin 1 Tablet(s) Oral daily  pantoprazole  Injectable 40 milliGRAM(s) IV Push daily  potassium phosphate IVPB 15 milliMole(s) IV Intermittent once  senna 2 Tablet(s) Oral at bedtime  sertraline 100 milliGRAM(s) Oral daily  vancomycin  IVPB 750 milliGRAM(s) IV Intermittent every 24 hours  vasopressin Infusion 0.02 Unit(s)/Min (1.2 mL/Hr) IV Continuous <Continuous>    MEDICATIONS  (PRN):    LABS: All Lab data reviewed and analyzed                       8.3    14.28 )-----------( 210      ( 12 Mar 2022 06:37 )             26.6    03-12    128<L>  |  93<L>  |  33<H>  ----------------------------<  123<H>  5.0   |  22  |  1.20    Ca    9.8      12 Mar 2022 06:34  Mg     2.1     03-12      Ca    9.8      12 Mar 2022 06:34  Phos  3.9     03-11  Mg     2.1     03-12    TPro  9.8<H>  /  Alb  3.5  /  TBili  0.4  /  DBili  x   /  AST  36  /  ALT  20  /  AlkPhos  170<H>  -    Ca    9.4      11 Mar 2022 11:26  Phos  3.9     03-11  Mg     2.0     03-11    TPro  9.8<H>  /  Alb  3.5  /  TBili  0.4  /  DBili  x   /  AST  36  /  ALT  20  /  AlkPhos  170<H>  -    Ca    9.3      10 Mar 2022 06:26  Phos  3.0     03-10  Mg     1.6     -10    TPro  8.3  /  Alb  3.0<L>  /  TBili  0.4  /  DBili  x   /  AST  35  /  ALT  23  /  AlkPhos  166<H>  -    Ca    8.9      08 Mar 2022 04:59  Phos  3.1     03-08  Mg     1.9     -08    TPro  7.8  /  Alb  2.9<L>  /  TBili  0.3  /  DBili  x   /  AST  40  /  ALT  24  /  AlkPhos  156<H>  -08               PTT - ( 01 Mar 2022 00:08 )  PTT:25.0 sec LIVER FUNCTIONS - ( 01 Mar 2022 17:38 )  Alb: 2.9 g/dL / Pro: 7.3 g/dL / ALK PHOS: 116 U/L / ALT: 30 U/L / AST: 29 U/L / GGT: x           RADIOLOGY: - Reviewed and analyzed

## 2022-03-15 NOTE — PROGRESS NOTE ADULT - SUBJECTIVE AND OBJECTIVE BOX
Patient is a 66y old  Male who presents with a chief complaint of Septic shock (15 Mar 2022 10:49)      INTERVAL History of Present Illness/OVERNIGHT EVENTS: await [facility] discussion later this week.      MEDICATIONS  (STANDING):  ascorbic acid 500 milliGRAM(s) Oral daily  cefepime   IVPB 1000 milliGRAM(s) IV Intermittent every 12 hours  chlorhexidine 2% Cloths 1 Application(s) Topical <User Schedule>  cholecalciferol 2000 Unit(s) Oral daily  insulin lispro (ADMELOG) corrective regimen sliding scale   SubCutaneous every 6 hours  methimazole 10 milliGRAM(s) Oral daily  metoprolol tartrate 25 milliGRAM(s) Oral two times a day  mexiletine 150 milliGRAM(s) Oral every 8 hours  mirtazapine 7.5 milliGRAM(s) Oral at bedtime  multivitamin 1 Tablet(s) Oral daily  pantoprazole  Injectable 40 milliGRAM(s) IV Push daily  senna 2 Tablet(s) Oral at bedtime  sertraline 100 milliGRAM(s) Oral daily  sodium chloride 1 Gram(s) Oral three times a day  sodium zirconium cyclosilicate 10 Gram(s) Oral daily  thiamine 100 milliGRAM(s) Oral daily  traMADol 25 milliGRAM(s) Oral once    MEDICATIONS  (PRN):      Allergies    Amiodarone Hydrochloride (Other (Severe))    Intolerances        REVIEW OF SYSTEMS:  Negative unless otherwise specified above.    Vital Signs Last 24 Hrs  T(C): 36.8 (14 Mar 2022 20:01), Max: 36.9 (14 Mar 2022 17:12)  T(F): 98.3 (14 Mar 2022 20:01), Max: 98.5 (14 Mar 2022 17:12)  HR: 88 (14 Mar 2022 20:01) (88 - 88)  BP: --  BP(mean): 80 (15 Mar 2022 08:20) (76 - 82)  RR: 18 (14 Mar 2022 20:01) (18 - 18)  SpO2: 98% (14 Mar 2022 20:01) (98% - 99%)        PHYSICAL EXAM:  GENERAL: No apparent distress, appears chronically ill  HEAD:  Atraumatic, Normocephalic  EYES: Conjunctiva and sclera clear, no discharge  ENMT: NGT, no discharge  NECK: Supple, +tracheostomy site dressing  CHEST/LUNG: Air entry bilaterally  HEART: +VAD hum  ABDOMEN: Soft, Nontender, +PC drain, RLQ driveline  EXTREMITIES:  No clubbing, cyanosis or edema  NERVOUS SYSTEM: Opens eyes, does not follow commands       LABS:                        7.2    11.58 )-----------( 198      ( 15 Mar 2022 06:08 )             23.5     15 Mar 2022 06:08    136    |  99     |  43     ----------------------------<  152    4.1     |  26     |  1.04     Ca    9.8        15 Mar 2022 06:08  Phos  2.7       15 Mar 2022 06:08  Mg     2.0       15 Mar 2022 06:08    TPro  9.3    /  Alb  3.3    /  TBili  0.3    /  DBili  x      /  AST  30     /  ALT  21     /  AlkPhos  210    15 Mar 2022 06:08        CAPILLARY BLOOD GLUCOSE      POCT Blood Glucose.: 177 mg/dL (15 Mar 2022 11:20)  POCT Blood Glucose.: 152 mg/dL (15 Mar 2022 05:54)  POCT Blood Glucose.: 188 mg/dL (14 Mar 2022 17:08)      RADIOLOGY & ADDITIONAL TESTS:      Images reviewed personally    Consultant Notes Reviewed and Care Discussed with relevant Consultants.

## 2022-03-15 NOTE — PROGRESS NOTE ADULT - SUBJECTIVE AND OBJECTIVE BOX
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 for trach decannulation 12/2, readmitted in 2/2022 with recurrent COVID-19 infection, initially in distributive shock. ENT called back to evaluate stoma which remains open. Unable to obtain history from pt due to clinical condition.         PAST MEDICAL & SURGICAL HISTORY:  CHF (congestive heart failure)    CAD (coronary artery disease)    Depression    Pleural effusion    History of 2019 novel coronavirus disease (COVID-19)  april 2020    Hemorrhoids    Bleeding hemorrhoids    Peripheral arterial disease    Claudication    BPH with urinary obstruction    ACC/AHA stage D systolic heart failure    Anticoagulation goal of INR 2.0 to 2.5    Falls    Clavicle fracture    CKD (chronic kidney disease), stage III    Iron deficiency anemia    H/O epistaxis    Vertigo    GI bleed    S/P TVR (tricuspid valve repair)    S/P ventricular assist device    S/P endoscopy    H/O tracheostomy      Allergies    Amiodarone Hydrochloride (Other (Severe))    Intolerances      MEDICATIONS  (STANDING):  ascorbic acid 500 milliGRAM(s) Oral daily  cefepime   IVPB 1000 milliGRAM(s) IV Intermittent every 12 hours  chlorhexidine 2% Cloths 1 Application(s) Topical <User Schedule>  cholecalciferol 2000 Unit(s) Oral daily  insulin lispro (ADMELOG) corrective regimen sliding scale   SubCutaneous every 6 hours  methimazole 10 milliGRAM(s) Oral daily  metoprolol tartrate 25 milliGRAM(s) Oral two times a day  mexiletine 150 milliGRAM(s) Oral every 8 hours  mirtazapine 7.5 milliGRAM(s) Oral at bedtime  multivitamin 1 Tablet(s) Oral daily  pantoprazole  Injectable 40 milliGRAM(s) IV Push daily  senna 2 Tablet(s) Oral at bedtime  sertraline 100 milliGRAM(s) Oral daily  sodium chloride 1 Gram(s) Oral three times a day  sodium zirconium cyclosilicate 10 Gram(s) Oral daily  thiamine 100 milliGRAM(s) Oral daily  traMADol 25 milliGRAM(s) Oral once    MEDICATIONS  (PRN):      Social History: see consult    Family history: see consult    ROS:   uto      Vital Signs Last 24 Hrs  T(C): 36.8 (14 Mar 2022 20:01), Max: 36.9 (14 Mar 2022 17:12)  T(F): 98.3 (14 Mar 2022 20:01), Max: 98.5 (14 Mar 2022 17:12)  HR: 88 (14 Mar 2022 20:01) (88 - 88)  BP: --  BP(mean): 78 (15 Mar 2022 12:20) (76 - 82)  RR: 18 (14 Mar 2022 20:01) (18 - 18)  SpO2: 98% (14 Mar 2022 20:01) (98% - 99%)                          7.2    13.30 )-----------( 193      ( 15 Mar 2022 14:02 )             23.1    03-15    136  |  99  |  43<H>  ----------------------------<  152<H>  4.1   |  26  |  1.04    Ca    9.8      15 Mar 2022 06:08  Phos  2.7     03-15  Mg     2.0     03-15    TPro  9.3<H>  /  Alb  3.3  /  TBili  0.3  /  DBili  x   /  AST  30  /  ALT  21  /  AlkPhos  210<H>  03-15       PHYSICAL EXAM:  Gen: NAD  Skin: No rashes, bruises, or lesions  Head: Normocephalic, Atraumatic  Face: no edema, erythema, or fluctuance. Parotid glands soft without mass  Eyes: no scleral injection  Nose: Nares bilaterally patent, no discharge  Mouth: No Stridor / Drooling / Trismus.  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 ENT called back to evaluate stoma which remains open. Unable to obtain history from pt due to clinical condition.         PAST MEDICAL & SURGICAL HISTORY:  CHF (congestive heart failure)    CAD (coronary artery disease)    Depression    Pleural effusion    History of 2019 novel coronavirus disease (COVID-19)  april 2020    Hemorrhoids    Bleeding hemorrhoids    Peripheral arterial disease    Claudication    BPH with urinary obstruction    ACC/AHA stage D systolic heart failure    Anticoagulation goal of INR 2.0 to 2.5    Falls    Clavicle fracture    CKD (chronic kidney disease), stage III    Iron deficiency anemia    H/O epistaxis    Vertigo    GI bleed    S/P TVR (tricuspid valve repair)    S/P ventricular assist device    S/P endoscopy    H/O tracheostomy      Allergies    Amiodarone Hydrochloride (Other (Severe))    Intolerances      MEDICATIONS  (STANDING):  ascorbic acid 500 milliGRAM(s) Oral daily  cefepime   IVPB 1000 milliGRAM(s) IV Intermittent every 12 hours  chlorhexidine 2% Cloths 1 Application(s) Topical <User Schedule>  cholecalciferol 2000 Unit(s) Oral daily  insulin lispro (ADMELOG) corrective regimen sliding scale   SubCutaneous every 6 hours  methimazole 10 milliGRAM(s) Oral daily  metoprolol tartrate 25 milliGRAM(s) Oral two times a day  mexiletine 150 milliGRAM(s) Oral every 8 hours  mirtazapine 7.5 milliGRAM(s) Oral at bedtime  multivitamin 1 Tablet(s) Oral daily  pantoprazole  Injectable 40 milliGRAM(s) IV Push daily  senna 2 Tablet(s) Oral at bedtime  sertraline 100 milliGRAM(s) Oral daily  sodium chloride 1 Gram(s) Oral three times a day  sodium zirconium cyclosilicate 10 Gram(s) Oral daily  thiamine 100 milliGRAM(s) Oral daily  traMADol 25 milliGRAM(s) Oral once    MEDICATIONS  (PRN):      Social History: see consult    Family history: see consult    ROS:   uto      Vital Signs Last 24 Hrs  T(C): 36.8 (14 Mar 2022 20:01), Max: 36.9 (14 Mar 2022 17:12)  T(F): 98.3 (14 Mar 2022 20:01), Max: 98.5 (14 Mar 2022 17:12)  HR: 88 (14 Mar 2022 20:01) (88 - 88)  BP: --  BP(mean): 78 (15 Mar 2022 12:20) (76 - 82)  RR: 18 (14 Mar 2022 20:01) (18 - 18)  SpO2: 98% (14 Mar 2022 20:01) (98% - 99%)                          7.2    13.30 )-----------( 193      ( 15 Mar 2022 14:02 )             23.1    03-15    136  |  99  |  43<H>  ----------------------------<  152<H>  4.1   |  26  |  1.04    Ca    9.8      15 Mar 2022 06:08  Phos  2.7     03-15  Mg     2.0     03-15    TPro  9.3<H>  /  Alb  3.3  /  TBili  0.3  /  DBili  x   /  AST  30  /  ALT  21  /  AlkPhos  210<H>  03-15       PHYSICAL EXAM:  Gen: NAD  Skin: No rashes, bruises, or lesions  Head: Normocephalic, Atraumatic  Face: no edema, erythema, or fluctuance. Parotid glands soft without mass  Eyes: no scleral injection  Nose: Nares bilaterally patent, no discharge  Mouth: No Stridor / Drooling / Trismus.  Mucosa moist, tongue/uvula midline, oropharynx clear  Neck: 3mm TCF remains patent, no erythema or discharge, Flat, supple, no lymphadenopathy, trachea midline, no masses  Lymphatic: No lymphadenopathy  Resp: breathing easily ORA, no stridor  Neuro: facial nerve intact, no facial droop

## 2022-03-15 NOTE — PROGRESS NOTE ADULT - PROBLEM SELECTOR PLAN 3
- continue Lopressor 25 mg PO BID  - holding carlos given hyperkalemia. Remains on Lokelma 10mg daily.  - maintain I=O

## 2022-03-15 NOTE — PROGRESS NOTE ADULT - PROBLEM SELECTOR PLAN 5
intermittent h/h drop s/p total 3units prbc (last 3/6)  no s/s of active GI bleed  monitor h/h  transfuse if hb<7 or symptomatic.

## 2022-03-15 NOTE — PROGRESS NOTE ADULT - ATTENDING COMMENTS
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 would leave alone for now and patient can f/u as outpatient for closure if he desires. 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 patient is not a candidate for PEG placement and cannot even consider PO trials as he remains significantly altered. he is not a surgical candidate for tracheostoma closure at this time. Can leave tracheocutaneous fistula open and if medical condition improved patient can f/u as outpatient for closure if he desires.

## 2022-03-15 NOTE — PROGRESS NOTE ADULT - ASSESSMENT
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 s/p HM2 LVAD in 9/2017 at  (due to severe PAD) with TV ring, multiple GI bleeds, thus maintained off AC, prior COVID-19 infection in 4/2020, recurrent syncope post LVAD s/p ILR, prolonged complex hospitalization from 6/14-11/16/2021 with GIB, respiratory failure s/p trach decanulated 12/2021, multiple infections, s/p cholecystotomy tube and SMA stent 10/2021, recurrent COVID 19 reinfection s/p MAB/remdesivir/steroids and distributive shock with serratia bacteremia, recurrent VT on mexiletine transferred from [facility] ED with metabolic encephalopathy, hypercarbic respiratory failure weaned off bipap, septic shock requiring pressors (now on midodrine) with serratia bacteremia on cefepime,  episodes of recurrent VT s/p lidocaine gtt now back on mexiletine. Nephrology called for hyponatremia.

## 2022-03-15 NOTE — PROGRESS NOTE ADULT - PROBLEM SELECTOR PLAN 1
- No need to cover fistula   - Keep area clean and dry  - Not currently a surgical candidate for closure   - Once discharged, pt can follow up as outpatient for TCF closure. Call [phone].   - No further ENT intervention at this time   - Call with questions or concerns

## 2022-03-15 NOTE — PROGRESS NOTE ADULT - SUBJECTIVE AND OBJECTIVE BOX
Subjective: Patient seen and examined resting in bed. ON no issues. Opens his eyes however remains non verbal     Medications:  ascorbic acid 500 milliGRAM(s) Oral daily  cefepime   IVPB 1000 milliGRAM(s) IV Intermittent every 12 hours  chlorhexidine 2% Cloths 1 Application(s) Topical <User Schedule>  cholecalciferol 2000 Unit(s) Oral daily  insulin lispro (ADMELOG) corrective regimen sliding scale   SubCutaneous every 6 hours  methimazole 10 milliGRAM(s) Oral daily  metoprolol tartrate 25 milliGRAM(s) Oral two times a day  mexiletine 150 milliGRAM(s) Oral every 8 hours  mirtazapine 7.5 milliGRAM(s) Oral at bedtime  multivitamin 1 Tablet(s) Oral daily  pantoprazole  Injectable 40 milliGRAM(s) IV Push daily  senna 2 Tablet(s) Oral at bedtime  sertraline 100 milliGRAM(s) Oral daily  sodium chloride 1 Gram(s) Oral three times a day  sodium zirconium cyclosilicate 10 Gram(s) Oral daily  thiamine 100 milliGRAM(s) Oral daily  traMADol 25 milliGRAM(s) Oral once    Vitals:  Vital Signs Last 24 Hours  T(C): 36.8 (22 @ 20:01), Max: 36.9 (22 @ 17:12)  HR: 88 (22 @ 20:01) (88 - 92)  BP: --  RR: 18 (22 @ 20:01) (18 - 18)  SpO2: 98% (22 @ 20:01) (98% - 100%)    Weight in k.8 (03-15 @ 08:42)    I&O's Summary    14 Mar 2022 07:01  -  15 Mar 2022 07:00  --------------------------------------------------------  IN: 0 mL / OUT: 505 mL / NET: -505 mL        Physical Exam  General: No distress.  Neck: Neck supple. JVP not elevated. No masses  Chest: Clear to auscultation bilaterally  CV: +VAD hum  Abdomen: Soft, non-distended, non-tender, +keotube   Neurology: opens his eyes but remains non verbal and does not follow commands     LVAD Interrogation  VAD TYPE HM 2  Speed 9200  Flow 4.9  Power 5.5  PI  5.6  Assessment of driveline exit site: driveline dressing c/d/i  Programming changes:     Labs:                        7.2    11.58 )-----------( 198      ( 15 Mar 2022 06:08 )             23.5     03-15    136  |  99  |  43<H>  ----------------------------<  152<H>  4.1   |  26  |  1.04    Ca    9.8      15 Mar 2022 06:08  Phos  2.7     03-15  Mg     2.0     -15    TPro  9.3<H>  /  Alb  3.3  /  TBili  0.3  /  DBili  x   /  AST  30  /  ALT  21  /  AlkPhos  210<H>  03-15          Serum Pro-Brain Natriuretic Peptide: 09120 pg/mL ( @ 04:57)              Imaging Studies

## 2022-03-15 NOTE — PROGRESS NOTE ADULT - ASSESSMENT
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    S/p prolonged complex hospitalization from 6/14-11/16/2021 initially admitted with abdominal pain complicated by GIB, respiratory failure s/p trach, multiple infections, s/p cholecystotomy tube and SMA stent 10/2021, ultimately discharged to [facility] rehab and then returned to [facility] for trach decannulation 12/2. The patient had a recent admission with COVID 19 reinfection and distributive shock. That admission he had blood culture positive for serratia marcescens (discharged on levaquin).  He was treated with MAB, remdesivir, and steroids. He had recurrent VT and was started mexiletine.     Now coming in from [facility] ED with leukocytosis and AMS was treated for UTI. Initially required bipap but was weaned off here. Labs concerning here for WBC 26, hemoglobin of 7s then 6.7, creatinine of 1.3. His maps were initially in the 50s. Physical exam showing pus from stoma, sat of central access of 81.8, and tachycardia are all concerning for septic shock picture. He is s/p 1L fluids, 1uPRBCs, was placed on vaso. Patient has known episodes of VT and had recurrent NSVT/VT when he first arrived started on lidocaine infusion with improvement. Of note he was previously on mexiletine 150 TID outpatient, metoprolol ER 25 qAM and 50 qPM. Previous history of thyrotoxicosis on amiodarone.    He was found to be bacteremic with recurrent serratia, on IV abx possibly r/t LVAD infection vs splenic abscess noted on CT 1/19. Afebrile since 3/1. s/p 1 unit PRBC 3/6. Vasopressin weaned off 3/4, started on midodrine which has now been off since 3/8. LVAD without s/s of pump malfunction. Currently he opens his eyes however remains nonverbal and not following commands. His mental status is still not back to baseline. His VT has now subsided, last episode was noted on 3/12.  His hyponatremia and hyperkalemia are overall improving after receiving the following treatment insulin/D50 and lokelma.  Overall fairly stable from HF perspective. 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 Currently patient is not a candidate for PEG placement. Family meeting has been arranged for this upcoming Thursday to discuss GOC and next steps.

## 2022-03-15 NOTE — PROGRESS NOTE ADULT - ASSESSMENT
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 s/p HM2 LVAD in 9/2017 at  (due to severe PAD) with TV ring, multiple GI bleeds, thus maintained off AC, prior COVID-19 infection in 4/2020, recurrent syncope post LVAD s/p ILR, prolonged complex hospitalization from 6/14-11/16/2021 with GIB, respiratory failure s/p trach decanulated 12/2021, multiple infections, s/p cholecystotomy tube and SMA stent 10/2021, recurrent COVID 19 reinfection s/p MAB/remdesivir/steroids and distributive shock with serratia bacteremia, recurrent VT on mexiletine transferred from [facility] ED with metabolic encephalopathy, hypercarbic respiratory failure weaned off bipap, septic shock requiring pressors (now on midodrine) with serratia bacteremia on cefepime, intermittent h/h drop s/p total 3units prbc (last 3/6), episodes of recurrent VT s/p lidocaine gtt now back on mexiletine.

## 2022-03-15 NOTE — OCCUPATIONAL THERAPY INITIAL EVALUATION ADULT - PREDICTED DURATION OF THERAPY (DAYS/WKS), OT EVAL
General Surgery Progress Note    Patient:  Ailyn Chaney Date:  3/15/2022   :  1948 Attending:  Vijay Altamirano MD   73 03/15/2022     03/15/2022    SODIUM 144 03/15/2022    POTASSIUM 3.1 (L) 03/15/2022    BUN 31 (H) 03/15/2022    CREATININE 0.80 03/15/2022    AST 57 (H) 03/15/2022    BILIRUBIN 0.5 03/15/[id number] 24 Hour Total [id number] [id number] MIMA Jenkins  Complex Abdominal Wall Surgery   [facility] [place]    Vijay Altamirano MD FACS     4 weeks

## 2022-03-15 NOTE — PROGRESS NOTE ADULT - PROBLEM SELECTOR PLAN 4
- Speed 9200  - Daily LDH, currently stable  - MAP goal 70-80; continue lopressor as stated above  - no AC given history of GIB

## 2022-03-16 NOTE — BH CONSULTATION LIAISON ASSESSMENT NOTE - MSE OPTIONS
I will START or STAY ON the medications listed below when I get home from the hospital:    Flagyl 500 mg oral tablet  -- 1 tab(s) by mouth every 8 hours  -- Do not drink alcoholic beverages when taking this medication.  Finish all this medication unless otherwise directed by prescriber.  May discolor urine or feces.    -- Indication: For Calculus of gallbladder with acute cholecystitis without obstruction    acetaminophen-oxycodone 325 mg-5 mg oral tablet  -- 1 tab(s) by mouth every 6 hours, As Needed, Moderate Pain MDD:4  -- Indication: For moderate pain     ibuprofen 600 mg oral tablet  -- 1 tab(s) by mouth every 6 hours, As Needed -for mild pain MDD:4  -- Do not take this drug if you are pregnant.  It is very important that you take or use this exactly as directed.  Do not skip doses or discontinue unless directed by your doctor.  May cause drowsiness or dizziness.  Obtain medical advice before taking any non-prescription drugs as some may affect the action of this medication.  Take with food or milk.    -- Indication: For mild pain     Ceftin 500 mg oral tablet  -- 1 tab(s) by mouth every 12 hours  -- Finish all this medication unless otherwise directed by prescriber.  Medication should be taken with plenty of water.  Take with food or milk.    -- Indication: For Calculus of gallbladder with acute cholecystitis without obstruction    Pepcid 20 mg oral tablet  -- 1 tab(s) by mouth 2 times a day  -- It is very important that you take or use this exactly as directed.  Do not skip doses or discontinue unless directed by your doctor.  Obtain medical advice before taking any non-prescription drugs as some may affect the action of this medication.    -- Indication: For gerd     ferrous sulfate 325 mg (65 mg elemental iron) oral tablet  -- 1 tab(s) by mouth 3 times a day  -- Check with your doctor before becoming pregnant.  Do not chew, break, or crush.  May discolor urine or feces.    -- Indication: For Anemia
Structured MSE

## 2022-03-16 NOTE — PROGRESS NOTE ADULT - ASSESSMENT
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 s/p HM2 LVAD in 9/2017 at  (due to severe PAD) with TV ring, multiple GI bleeds, thus maintained off AC, prior COVID-19 infection in 4/2020, recurrent syncope post LVAD s/p ILR, prolonged complex hospitalization from 6/14-11/16/2021 with GIB, respiratory failure s/p trach decanulated 12/2021, multiple infections, s/p cholecystotomy tube and SMA stent 10/2021, recurrent COVID 19 reinfection s/p MAB/remdesivir/steroids and distributive shock with serratia bacteremia, recurrent VT on mexiletine transferred from [facility] ED with metabolic encephalopathy, hypercarbic respiratory failure weaned off bipap, septic shock requiring pressors (now on midodrine) with serratia bacteremia on cefepime, intermittent h/h drop s/p total 3units prbc (last 3/6), episodes of recurrent VT s/p lidocaine gtt now back on mexiletine.

## 2022-03-16 NOTE — BH CONSULTATION LIAISON ASSESSMENT NOTE - OTHER
patient does not follow most commands, is not violent no collateral available at this time, pending callback not known at this time diffuse cachexia, temporal and b/l hand wasting

## 2022-03-16 NOTE — BH CONSULTATION LIAISON ASSESSMENT NOTE - SUMMARY
66M with unclear psychiatric history domiciled with sister in private residence with history of Stage D 2/2 NICM s/p HM2 LVAD in 9/2017 at  (due to severe PAD) with TV ring, multiple GI bleeds, thus maintained off AC, prior COVID-19 infection in 4/2020, recurrent syncope post LVAD s/p ILR, prolonged complex hospitalization from 6/14-11/16/2021 with GIB, respiratory failure s/p trach decanulated 12/2021, multiple infections, s/p cholecystotomy tube and SMA stent 10/2021, recurrent COVID 19 reinfection s/p MAB/remdesivir/steroids and distributive shock with serratia bacteremia, recurrent VT on mexiletine transferred from [facility] ED with metabolic encephalopathy, hypercarbic respiratory failure weaned off bipap, septic shock requiring pressors (now on midodrine) with serratia bacteremia on cefepime, intermittent h/h drop s/p total 3units prbc (last 3/6), episodes of recurrent VT s/p lidocaine gtt now back on mexiletine, admitted to medicine for continued management of bacteremia and post septic shock. Psychiatry consult for declining mental status.

## 2022-03-16 NOTE — PROGRESS NOTE ADULT - ASSESSMENT
Assessment and Recommendation:   · Assessment	  Assessment and recommendation :  Acute hypercapnic respiratory Failure on 3 liter on nasal cannula    S/P Septic shock   Bacteremia with serratia marcescens  Severe anemia with low H/H   S/P transfuse one unit of Packed RBCs   Severe ischemic cardiomyopathy   ACC/AHA stage D systolic heart failure  Peripheral vascular Disease   PAF on no ACO   H/O of repeated GI bleeding   S/P mesenteric ischemia   GERD on Reglan   S/P HM2 LVAD , VT on Mexiletine   hyperthyroidism on  methimazole   severe protein caloric malnutrition   severe neuropathy   Major depression   S/P tracheostomy X2  Continue Antibiotic cefepime   NG tube feeding   GI prophylaxis   patient is DNR/DNI

## 2022-03-16 NOTE — BH CONSULTATION LIAISON ASSESSMENT NOTE - CASE SUMMARY
This is a 66-y.o. CAM patient with unclear psychiatric history domiciled with sister in private residence with history of Stage D 2/2 NICM s/p HM2 LVAD in 9/2017 at  (due to severe PAD) with TV ring, multiple GI bleeds, thus maintained off AC, prior COVID-19 infection in 4/2020, recurrent syncope post LVAD s/p ILR, prolonged complex hospitalization from 6/14-11/16/2021 with GIB, respiratory failure s/p trach decanulated 12/2021, multiple infections, s/p cholecystotomy tube and SMA stent 10/2021, recurrent COVID 19 reinfection s/p MAB/remdesivir/steroids and distributive shock with serratia bacteremia, recurrent VT on mexiletine transferred from [facility] ED with metabolic encephalopathy, hypercarbic respiratory failure weaned off bipap, septic shock requiring pressors (now on midodrine) with serratia bacteremia on cefepime, intermittent h/h drop s/p total 3units prbc (last 3/6), episodes of recurrent VT s/p lidocaine gtt now back on mexiletine, admitted to medicine for continued management of bacteremia and post septic shock. Psychiatry consult for declining mental status.    I have seen and evaluated this patient myself. Chart, labs, meds reviewed. I agree with resident's assessment and plan. Patient presenting with clouding of sensorium, indicative of hypoactive delirium.

## 2022-03-16 NOTE — PROGRESS NOTE ADULT - SUBJECTIVE AND OBJECTIVE BOX
Patient is a 66y old  Male who presents with a chief complaint of Septic shock (16 Mar 2022 12:04)      INTERVAL History of Present Illness/OVERNIGHT EVENTS: Hb<7; being transfused.  GI fellow Dr. Kelly notified about PEG re-evaluation given electrolytes optimized.    MEDICATIONS  (STANDING):  ascorbic acid 500 milliGRAM(s) Oral daily  cefepime   IVPB 1000 milliGRAM(s) IV Intermittent every 12 hours  chlorhexidine 2% Cloths 1 Application(s) Topical <User Schedule>  cholecalciferol 2000 Unit(s) Oral daily  insulin lispro (ADMELOG) corrective regimen sliding scale   SubCutaneous every 6 hours  metoprolol tartrate 50 milliGRAM(s) Oral two times a day  mexiletine 150 milliGRAM(s) Oral every 8 hours  mirtazapine 7.5 milliGRAM(s) Oral at bedtime  multivitamin 1 Tablet(s) Oral daily  pantoprazole  Injectable 40 milliGRAM(s) IV Push daily  senna 2 Tablet(s) Oral at bedtime  sertraline 100 milliGRAM(s) Oral daily  sodium chloride 1 Gram(s) Oral three times a day  sodium zirconium cyclosilicate 10 Gram(s) Oral daily  thiamine 100 milliGRAM(s) Oral daily  traMADol 25 milliGRAM(s) Oral once    MEDICATIONS  (PRN):      Allergies    Amiodarone Hydrochloride (Other (Severe))    Intolerances        REVIEW OF SYSTEMS:  Negative unless otherwise specified above.    Vital Signs Last 24 Hrs  T(C): 37.4 (16 Mar 2022 12:40), Max: 37.5 (16 Mar 2022 12:27)  T(F): 99.4 (16 Mar 2022 12:40), Max: 99.5 (16 Mar 2022 12:27)  HR: 116 (16 Mar 2022 12:15) (102 - 137)  BP: 95/66 (16 Mar 2022 12:15) (95/66 - 101/73)  BP(mean): 78 (16 Mar 2022 12:27) (76 - 84)  RR: 16 (16 Mar 2022 12:15) (16 - 18)  SpO2: 100% (16 Mar 2022 12:15) (95% - 100%)        PHYSICAL EXAM:  GENERAL: No apparent distress, appears chronically ill  HEAD:  Atraumatic, Normocephalic  EYES: Conjunctiva and sclera clear, no discharge  ENMT: NGT, no discharge  NECK: Supple, +tracheostomy   CHEST/LUNG: Air entry bilaterally  HEART: +VAD hum  ABDOMEN: Soft, Nontender, +PC drain, RLQ driveline  EXTREMITIES:  No clubbing, cyanosis or edema  NERVOUS SYSTEM: Opens eyes, does not follow commands       LABS:                        6.9    12.40 )-----------( 203      ( 16 Mar 2022 09:27 )             22.6     16 Mar 2022 06:37    142    |  106    |  58     ----------------------------<  168    4.2     |  26     |  0.96     Ca    9.9        16 Mar 2022 06:37  Mg     1.9       16 Mar 2022 06:37    TPro  9.4    /  Alb  3.2    /  TBili  0.4    /  DBili  x      /  AST  35     /  ALT  24     /  AlkPhos  207    16 Mar 2022 06:37        CAPILLARY BLOOD GLUCOSE      POCT Blood Glucose.: 187 mg/dL (16 Mar 2022 11:30)  POCT Blood Glucose.: 191 mg/dL (16 Mar 2022 07:04)  POCT Blood Glucose.: 165 mg/dL (16 Mar 2022 00:15)  POCT Blood Glucose.: 178 mg/dL (15 Mar 2022 18:19)      RADIOLOGY & ADDITIONAL TESTS:      Images reviewed personally    Consultant Notes Reviewed and Care Discussed with relevant Consultants.

## 2022-03-16 NOTE — BH CONSULTATION LIAISON ASSESSMENT NOTE - DIFFERENTIAL
likely inpatient delirium, however noted ?new focal neurological deficits as compared to the prior documented neurological examination earlier in course, rule out acute intracranial pathology such as stroke or hemorrhage likely inpatient delirium, however noted ?new focal neurological deficits as compared to the prior documented neurological examination earlier in course, rule out acute intracranial pathology such as stroke or hemorrhage    no hyperreflexia, shivering, low concern/suspicion for serotonin syndrome    given waxing/waning mental status, more concern for inpatient delirium/toxic metabolic encephalopathy, lower concern for catatonia likely inpatient hypoactive delirium, however noted ?new focal neurological deficits as compared to the prior documented neurological examination earlier in course, rule out acute intracranial pathology such as stroke or hemorrhage    no hyperreflexia, shivering, low concern/suspicion for serotonin syndrome    given waxing/waning mental status, more concern for inpatient delirium/toxic metabolic encephalopathy, lower concern for catatonia

## 2022-03-16 NOTE — PROGRESS NOTE ADULT - PROBLEM SELECTOR PLAN 5
intermittent h/h drop s/p total 3units prbc (last 3/6)  no s/s of active GI bleed  monitor h/h  transfuse if hb<7 or symptomatic.  trend CBC daily

## 2022-03-16 NOTE — PROGRESS NOTE ADULT - SUBJECTIVE AND OBJECTIVE BOX
RICKY JOINT  MRN#: [id number]  Subjective: pulmonary progress note  : acute hypercapnic respiratory failure . Septic shock , service rendered on 2002   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 s/p HM2 LVAD in 2017 at  (due to severe PAD) with TV ring, multiple GI bleeds, thus maintained off AC, CKD 3prior COVID-19 infection in 2020, recurrent syncope post LVAD s/p ILR, s/p prolonged complex hospitalization from -2021 initially admitted with abdominal pain complicated by GIB, respiratory failure s/p trach, multiple infections, s/p cholecystotomy tube and SMA stent 10/2021, ultimately discharged to [facility] rehab and then returned to [facility] for trach decannulation , readmitted in 2022 with recurrent COVID-19 infection, initially in distributive shock. Blood cultures were also positive for serratia marcescens which he has had in the past. seen by heart failure team patient is DNR/DNI  continue on 3 liter nasal canula flat Affect , hyperkalemia lokelma , runs of V-Tach. more stable  very weak , non verbal non communicative on tube feeding , leg booting          (2022 22:20)    PAST MEDICAL & SURGICAL HISTORY:  CHF (congestive heart failure)    CAD (coronary artery disease)  Depression    Pleural effusion    History of 2019 novel coronavirus disease (COVID-19)  2020    Hemorrhoids    Bleeding hemorrhoids    Peripheral arterial disease    Claudication    BPH with urinary obstruction    ACC/AHA stage D systolic heart failure    Anticoagulation goal of INR 2.0 to 2.5    Falls    Clavicle fracture    CKD (chronic kidney disease), stage III    Iron deficiency anemia    H/O epistaxis    Vertigo    GI bleed    S/P TVR (tricuspid valve repair)    S/P ventricular assist device    S/P endoscopy    H/O tracheostomy        FAMILY HISTORY:    Social History:    Marital Status:  X    (   ) Single    (   )    (  )   Occupation: Retired   Lives with: (  ) alone  (  ) children  X spouse   (  ) parents  (  ) other    Substance Use (street drugs): X never used  (  ) other:  Tobacco Usage: X never smoked   (   ) former smoker   (   ) current smoker  (     ) pack year  (    ) last cigarette date  Alcohol Usage: Social         OBJECTIVE:  ICU Vital Signs Last 24 Hrs  T(C): 37.2 (01 Mar 2022 16:00), Max: 38.4 (01 Mar 2022 09:00)  T(F): 99 (01 Mar 2022 16:00), Max: 101.1 (01 Mar 2022 09:00)  HR: 109 (01 Mar 2022 18:00) (67 - 124)  BP: --  BP(mean): 86 (2022 20:00) (86 - 86)  ABP: 89/74 (01 Mar 2022 18:00) (75/63 - 160/85)  ABP(mean): 81 (01 Mar 2022 18:00) (68 - 139)  RR: 38 (01 Mar 2022 18:00) (5 - 40)  SpO2: 99% (01 Mar 2022 18:00) (94% - 100%)       @ 07: @ 07:00  --------------------------------------------------------  IN: 1060.7 mL / OUT: 1600 mL / NET: -539.3 mL     @ 07:01   @ 18:22  --------------------------------------------------------  IN: 677.8 mL / OUT: 455 mL / NET: 222.8 mL    PHYSICAL EXAM :Daily Height in cm: 177.8 (2022 21:20)  Elderly frail male on 3 liter nasal cannula    Daily Weight in k.7 (2022 21:20)  HEENT:     + NCAT  + EOMI  - Conjuctival edema   - Icterus   - Thrush   - ETT  - NGT/OGT  Neck:         + FROM RT IJ  JVD     - Nodes     - Masses    + Mid-line trachea   - Tracheostomy  Chest:           mild kyphosis   Lungs:          + CTA  + Rhonchi  + Rales    - Wheezing  + Decreased BS   - Dullness R L  Cardiac:       + S1 + S2    + RRR   - Irregular   - S3  - S4    - Murmurs   - Rub   - Hamman’s sign   Abdomen:    + BS     + Soft    + Non-tender  - Distended  - Organomegaly  - PEG Cholecystostomy tube in place   Extremities:   - Cyanosis U/L   - Clubbing  U/L  - LE/UE Edema   + Capillary refill    + Pulses   Neuro:        + Awake   +  Alert   - Confused   - Lethargic   - Sedated   - Generalized Weakness  Skin:        - Rashes    - Erythema   + Normal incisions   + IV sites intact  -      HOSPITAL MEDICATIONS: All mediciations reviewed and analyzed  MEDICATIONS  (STANDING):  albumin human  5% IVPB 250 milliLiter(s) IV Intermittent once  cefepime   IVPB 2000 milliGRAM(s) IV Intermittent every 12 hours  chlorhexidine 2% Cloths 1 Application(s) Topical <User Schedule>  cholecalciferol 1000 Unit(s) Oral daily  dextrose 40% Gel 15 Gram(s) Oral once  dextrose 50% Injectable 25 Gram(s) IV Push once  gabapentin Solution 100 milliGRAM(s) Oral three times a day  glucagon  Injectable 1 milliGRAM(s) IntraMuscular once  insulin lispro (ADMELOG) corrective regimen sliding scale   SubCutaneous every 6 hours  magnesium sulfate  IVPB 1 Gram(s) IV Intermittent once  methimazole 10 milliGRAM(s) Oral daily  metoclopramide 10 milliGRAM(s) Oral four times a day  mexiletine 150 milliGRAM(s) Oral every 8 hours  mirtazapine 7.5 milliGRAM(s) Oral at bedtime  multivitamin 1 Tablet(s) Oral daily  pantoprazole  Injectable 40 milliGRAM(s) IV Push daily  potassium phosphate IVPB 15 milliMole(s) IV Intermittent once  senna 2 Tablet(s) Oral at bedtime  sertraline 100 milliGRAM(s) Oral daily  vancomycin  IVPB 750 milliGRAM(s) IV Intermittent every 24 hours  vasopressin Infusion 0.02 Unit(s)/Min (1.2 mL/Hr) IV Continuous <Continuous>    MEDICATIONS  (PRN):    LABS: All Lab data reviewed and analyzed                        6.9    1240 )-----------( 203      ( 16 Mar 2022 09:27 )             22.6   03-16    142  |  106  |  58<H>  ----------------------------<  168<H>  4.2   |  26  |  0.96    Ca    9.9      16 Mar 2022 06:37  Phos  2.7     03-15  Mg     1.9     03-16    TPro  9.4<H>  /  Alb  3.2<L>  /  TBili  0.4  /  DBili  x   /  AST  35  /  ALT  24  /  AlkPhos  207<H>  03-16    Ca    9.8      12 Mar 2022 06:34  Mg     2.1     03-12      Ca    9.8      12 Mar 2022 06:34  Phos  3.9     03-11  Mg     2.1     03-12    TPro  9.8<H>  /  Alb  3.5  /  TBili  0.4  /  DBili  x   /  AST  36  /  ALT  20  /  AlkPhos  170<H>  03-    Ca    9.4      11 Mar 2022 11:26  Phos  3.9     03-11  Mg     2.0     03-11    TPro  9.8<H>  /  Alb  3.5  /  TBili  0.4  /  DBili  x   /  AST  36  /  ALT  20  /  AlkPhos  170<H>  -    Ca    9.3      10 Mar 2022 06:26  Phos  3.0     03-10  Mg     1.6     03-10    TPro  8.3  /  Alb  3.0<L>  /  TBili  0.4  /  DBili  x   /  AST  35  /  ALT  23  /  AlkPhos  166<H>  -    Ca    8.9      08 Mar 2022 04:59  Phos  3.1     03-08  Mg     1.9     -08    TPro  7.8  /  Alb  2.9<L>  /  TBili  0.3  /  DBili  x   /  AST  40  /  ALT  24  /  AlkPhos  156<H>                 PTT - ( 01 Mar 2022 00:08 )  PTT:25.0 sec LIVER FUNCTIONS - ( 01 Mar 2022 17:38 )  Alb: 2.9 g/dL / Pro: 7.3 g/dL / ALK PHOS: 116 U/L / ALT: 30 U/L / AST: 29 U/L / GGT: x           RADIOLOGY: - Reviewed and analyzed

## 2022-03-16 NOTE — BH CONSULTATION LIAISON ASSESSMENT NOTE - ADDITIONAL DETAILS / COMMENTS
patient grimaces to noxious stim upon face and b/l UE; does not grimace to noxious stim of b/l LE  Reflexes: 2+ L biceps, brachioradialis, triceps, trace R brachioradialis, 2+ R biceps & triceps, 1+ patellar b/l, 0 b/l achilles  plantar reflex mute b/l patient intermittently tracks examiner, noted left-sided preference though EOM able to cross midline  patient grimaces to noxious stim upon face and b/l UE; does not grimace to noxious stim of b/l LE  noted b/l legs contracted, however right externally rotated  Reflexes: 2+ L biceps, brachioradialis, triceps, trace R brachioradialis, 2+ R biceps & triceps, 1+ patellar b/l, 0 b/l achilles  plantar reflex mute b/l

## 2022-03-16 NOTE — PROGRESS NOTE ADULT - PROBLEM SELECTOR PLAN 3
Lab report  Sent on [employer] message.    - increase Lopressor 50 mg PO BID  - holding carlos given hyperkalemia. Remains on Lokelma 10mg daily.  - maintain I=O

## 2022-03-16 NOTE — BH CONSULTATION LIAISON ASSESSMENT NOTE - CURRENT MEDICATION
MEDICATIONS  (STANDING):  ascorbic acid 500 milliGRAM(s) Oral daily  cefepime   IVPB 1000 milliGRAM(s) IV Intermittent every 12 hours  chlorhexidine 2% Cloths 1 Application(s) Topical <User Schedule>  cholecalciferol 2000 Unit(s) Oral daily  insulin lispro (ADMELOG) corrective regimen sliding scale   SubCutaneous every 6 hours  metoprolol tartrate 50 milliGRAM(s) Oral two times a day  mexiletine 150 milliGRAM(s) Oral every 8 hours  mirtazapine 7.5 milliGRAM(s) Oral at bedtime  multivitamin 1 Tablet(s) Oral daily  pantoprazole  Injectable 40 milliGRAM(s) IV Push daily  senna 2 Tablet(s) Oral at bedtime  sertraline 100 milliGRAM(s) Oral daily  sodium chloride 1 Gram(s) Oral three times a day  sodium zirconium cyclosilicate 10 Gram(s) Oral daily  thiamine 100 milliGRAM(s) Oral daily  traMADol 25 milliGRAM(s) Oral once    MEDICATIONS  (PRN):

## 2022-03-16 NOTE — PROGRESS NOTE ADULT - SUBJECTIVE AND OBJECTIVE BOX
Chief Complaint:  Patient is a 66y old  Male who presents with a chief complaint of Septic shock (16 Mar 2022 13:24)      Reason for consult:    Interval Events:     Hospital Medications:  ascorbic acid 500 milliGRAM(s) Oral daily  cefepime   IVPB 1000 milliGRAM(s) IV Intermittent every 12 hours  chlorhexidine 2% Cloths 1 Application(s) Topical <User Schedule>  cholecalciferol 2000 Unit(s) Oral daily  insulin lispro (ADMELOG) corrective regimen sliding scale   SubCutaneous every 6 hours  metoprolol tartrate 50 milliGRAM(s) Oral two times a day  mexiletine 150 milliGRAM(s) Oral every 8 hours  mirtazapine 7.5 milliGRAM(s) Oral at bedtime  multivitamin 1 Tablet(s) Oral daily  pantoprazole  Injectable 40 milliGRAM(s) IV Push daily  senna 2 Tablet(s) Oral at bedtime  sertraline 100 milliGRAM(s) Oral daily  sodium chloride 1 Gram(s) Oral three times a day  sodium zirconium cyclosilicate 10 Gram(s) Oral daily  thiamine 100 milliGRAM(s) Oral daily  traMADol 25 milliGRAM(s) Oral once      ROS:   General:  No  fevers, chills, night sweats, fatigue  Eyes:  Good vision, no reported pain  ENT:  No sore throat, pain, runny nose  CV:  No pain, palpitations  Pulm:  No dyspnea, cough  GI:  See HPI, otherwise negative  :  No  incontinence, nocturia  Muscle:  No pain, weakness  Neuro:  No memory problems  Psych:  No insomnia, mood problems, depression  Endocrine:  No polyuria, polydipsia, cold/heat intolerance  Heme:  No petechiae, ecchymosis, easy bruisability  Skin:  No rash    PHYSICAL EXAM:   Vital Signs:  Vital Signs Last 24 Hrs  T(C): 37.3 (16 Mar 2022 14:31), Max: 37.5 (16 Mar 2022 12:27)  T(F): 99.2 (16 Mar 2022 14:31), Max: 99.5 (16 Mar 2022 12:27)  HR: 125 (16 Mar 2022 15:15) (102 - 137)  BP: 104/77 (16 Mar 2022 14:31) (94/63 - 104/77)  BP(mean): 83 (16 Mar 2022 15:15) (76 - 86)  RR: 16 (16 Mar 2022 15:15) (16 - 19)  SpO2: 100% (16 Mar 2022 15:15) (95% - 100%)  Daily     Daily Weight in k.9 (16 Mar 2022 08:43)    GENERAL: no acute distress  NEURO: alert  HEENT: anicteric sclera, no conjunctival pallor appreciated  CHEST: no respiratory distress, no accessory muscle use  CARDIAC: regular rate, rhythm  ABDOMEN: soft, non-tender, non-distended, no rebound or guarding  EXTREMITIES: warm, well perfused, no edema  SKIN: no lesions noted    LABS: reviewed                        6.9    12.40 )-----------( 203      ( 16 Mar 2022 09:27 )             22.6     03-16    142  |  106  |  58<H>  ----------------------------<  168<H>  4.2   |  26  |  0.96    Ca    9.9      16 Mar 2022 06:37  Phos  2.7     03-15  Mg     1.9     -16    TPro  9.4<H>  /  Alb  3.2<L>  /  TBili  0.4  /  DBili  x   /  AST  35  /  ALT  24  /  AlkPhos  207<H>  03-16    LIVER FUNCTIONS - ( 16 Mar 2022 06:37 )  Alb: 3.2 g/dL / Pro: 9.4 g/dL / ALK PHOS: 207 U/L / ALT: 24 U/L / AST: 35 U/L / GGT: x             Interval Diagnostic Studies: see sunrise for full report   Chief Complaint:  Patient is a 66y old  Male who presents with a chief complaint of Septic shock (16 Mar 2022 13:24)      Interval Events:   Called back by primary to re-evaluate for PEG.  ID reporting clearance of bacteremia.  Cardiology reporting patient arrhythmia controlled.  Tachycardic, but stable. Remains nonverbal  Family GOC meeting planned for Thursday.    Hospital Medications:  ascorbic acid 500 milliGRAM(s) Oral daily  cefepime   IVPB 1000 milliGRAM(s) IV Intermittent every 12 hours  chlorhexidine 2% Cloths 1 Application(s) Topical <User Schedule>  cholecalciferol 2000 Unit(s) Oral daily  insulin lispro (ADMELOG) corrective regimen sliding scale   SubCutaneous every 6 hours  metoprolol tartrate 50 milliGRAM(s) Oral two times a day  mexiletine 150 milliGRAM(s) Oral every 8 hours  mirtazapine 7.5 milliGRAM(s) Oral at bedtime  multivitamin 1 Tablet(s) Oral daily  pantoprazole  Injectable 40 milliGRAM(s) IV Push daily  senna 2 Tablet(s) Oral at bedtime  sertraline 100 milliGRAM(s) Oral daily  sodium chloride 1 Gram(s) Oral three times a day  sodium zirconium cyclosilicate 10 Gram(s) Oral daily  thiamine 100 milliGRAM(s) Oral daily  traMADol 25 milliGRAM(s) Oral once      ROS:   Unable to obtain due to patient's condition.    PHYSICAL EXAM:   Vital Signs:  Vital Signs Last 24 Hrs  T(C): 37.3 (16 Mar 2022 14:31), Max: 37.5 (16 Mar 2022 12:27)  T(F): 99.2 (16 Mar 2022 14:31), Max: 99.5 (16 Mar 2022 12:27)  HR: 125 (16 Mar 2022 15:15) (102 - 137)  BP: 104/77 (16 Mar 2022 14:31) (94/63 - 104/77)  BP(mean): 83 (16 Mar 2022 15:15) (76 - 86)  RR: 16 (16 Mar 2022 15:15) (16 - 19)  SpO2: 100% (16 Mar 2022 15:15) (95% - 100%)  Daily     Daily Weight in k.9 (16 Mar 2022 08:43)    GENERAL: ill appearing  NEURO: non responsive, intermittently opens eyes  HEENT: NGT in place  CHEST: no respiratory distress, no accessory muscle use  CARDIAC: tachycardic  ABDOMEN: soft, non-tender, non-distended, no rebound or guarding  EXTREMITIES: cool to touch, no edema  SKIN: dry      LABS: reviewed                        6.9    12.40 )-----------( 203      ( 16 Mar 2022 09:27 )             22.6     03-16    142  |  106  |  58<H>  ----------------------------<  168<H>  4.2   |  26  |  0.96    Ca    9.9      16 Mar 2022 06:37  Phos  2.7     03-15  Mg     1.9     -16    TPro  9.4<H>  /  Alb  3.2<L>  /  TBili  0.4  /  DBili  x   /  AST  35  /  ALT  24  /  AlkPhos  207<H>  03-16    LIVER FUNCTIONS - ( 16 Mar 2022 06:37 )  Alb: 3.2 g/dL / Pro: 9.4 g/dL / ALK PHOS: 207 U/L / ALT: 24 U/L / AST: 35 U/L / GGT: x             Interval Diagnostic Studies: see sunrise for full report

## 2022-03-16 NOTE — BH CONSULTATION LIAISON ASSESSMENT NOTE - HPI (INCLUDE ILLNESS QUALITY, SEVERITY, DURATION, TIMING, CONTEXT, MODIFYING FACTORS, ASSOCIATED SIGNS AND SYMPTOMS)
66M with unclear psychiatric history domiciled with sister in private residence with history of Stage D 2/2 NICM s/p HM2 LVAD in 9/2017 at  (due to severe PAD) with TV ring, multiple GI bleeds, thus maintained off AC, prior COVID-19 infection in 4/2020, recurrent syncope post LVAD s/p ILR, prolonged complex hospitalization from 6/14-11/16/2021 with GIB, respiratory failure s/p trach decanulated 12/2021, multiple infections, s/p cholecystotomy tube and SMA stent 10/2021, recurrent COVID 19 reinfection s/p MAB/remdesivir/steroids and distributive shock with serratia bacteremia, recurrent VT on mexiletine transferred from [facility] ED with metabolic encephalopathy, hypercarbic respiratory failure weaned off bipap, septic shock requiring pressors (now on midodrine) with serratia bacteremia on cefepime, intermittent h/h drop s/p total 3units prbc (last 3/6), episodes of recurrent VT s/p lidocaine gtt now back on mexiletine, admitted to medicine for continued management of bacteremia and post septic shock. Psychiatry consult for declining mental status.     Spoke with nursing staff at bedside, who reports that patient can blink eyes when asked in English. Patient otherwise is nonverbal to them and does not follow any other commands.    Patient unable to provide history.     Pending discussion with sister for more understanding of potential past psychiatric history.    Per chart review, patient currently undergoing treatment for bacteremia, seen by Neurology earlier 3/2022, was nonverbal at that time, EEG demonstrated Moderate nonspecific diffuse or multifocal cerebral dysfunction and CT head imaging unremarkable.    66M with unclear psychiatric history domiciled with sister in private residence with history of Stage D 2/2 NICM s/p HM2 LVAD in 9/2017 at  (due to severe PAD) with TV ring, multiple GI bleeds, thus maintained off AC, prior COVID-19 infection in 4/2020, recurrent syncope post LVAD s/p ILR, prolonged complex hospitalization from 6/14-11/16/2021 with GIB, respiratory failure s/p trach decanulated 12/2021, multiple infections, s/p cholecystotomy tube and SMA stent 10/2021, recurrent COVID 19 reinfection s/p MAB/remdesivir/steroids and distributive shock with serratia bacteremia, recurrent VT on mexiletine transferred from [facility] ED with metabolic encephalopathy, hypercarbic respiratory failure weaned off bipap, septic shock requiring pressors (now on midodrine) with serratia bacteremia on cefepime, intermittent h/h drop s/p total 3units prbc (last 3/6), episodes of recurrent VT s/p lidocaine gtt now back on mexiletine, admitted to medicine for continued management of bacteremia and post septic shock. Psychiatry consult for declining mental status.     Spoke with nursing staff at bedside, who reports that patient can blink eyes when asked in English. Patient otherwise is nonverbal to them and does not follow any other commands.    Patient unable to provide history.     Spoke with sister Cristina over the phone for collateral information. She reports that prior to patient's most recent rehab course, patient was at his baseline - able to communicate (mostly in [place] Creole, which is his preferred language, though he understands some English) and have normal daily conversations, requiring some assistance with his ADLs due to his LVAD and previous hospital course. She is not aware of patient having psychiatric illness or prior inpatient psychiatric hospitalizations. When asking her about mirtazapine and sertraline, she was not aware that patient was on these medications. I discussed with her that in 2017 after patient had LVAS patient had appeared withdrawn and mirtazapine had been started to help with his mood and appetite. Sertraline may have been started from rehab or from other hospitalization not in system, as patient's current medication reconciliation had sertraline, but sister was also unaware of this medication. Prior to 2017-18, patient worked in a [employer], but since the heart surgery has been out of work. As Cristina has been in touch with patient over time, his cognition and mood to her appeared normal and she feels when patient had fall in rehab, patient appeared to "talk less" with some of what he would say not making sense. There were times during patient's rehab stay that she was not allowed to visit him due to COVID19 restrictions in visiting policy, but she was not told about his mood being affected. No family history of psychiatric illnesses.    Per chart review, patient currently undergoing treatment for bacteremia, seen by Neurology earlier 3/2022, was nonverbal at that time, EEG demonstrated Moderate nonspecific diffuse or multifocal cerebral dysfunction and CT head imaging unremarkable.

## 2022-03-16 NOTE — BH CONSULTATION LIAISON ASSESSMENT NOTE - NSBHCONSULTRECOMMENDOTHER_PSY_A_CORE FT
Continue to treat underlying metabolic disturbances  Please obtain CTH noncontrast or MRI head w/o contrast  For now would continue mirtazapine 7.5mg qHS and sertraline 100mg daily Continue to treat underlying metabolic disturbances  Please obtain CTH noncontrast or MRI head w/o contrast  continue mirtazapine 7.5mg qHS and sertraline 100mg daily  would recommend primary team to clarify with rehab regarding why sertraline started  start melatonin 3mg qHS  start seroquel 12.5mg qHS Continue to treat underlying metabolic disturbances  Please obtain CTH noncontrast or MRI head w/o contrast  discontinue mirtazapine 7.5mg qHS   continue sertraline 100mg daily  would recommend primary team to clarify with rehab regarding why sertraline started  start melatonin 3mg qHS  start seroquel 12.5mg q6h PRN for agitation

## 2022-03-16 NOTE — PROGRESS NOTE ADULT - PROBLEM SELECTOR PLAN 3
resolved shock; treating serratia bacteremia wit cefepime  required pressors titrated off and now off midodrine  due to serratia bacteremia- source ?LVAD  bcx 3/6 NG  CVP was low despite albumin and IVF in CICU   ?purulent sputum coming out from previous trach site - evaluated by ENT and felt no signs of infection   CXR no PNA  CTH unrevealing  LVAD exit sites shows no signs of infection  Perc dawn tube site shows no signs of infection  CT showed abdominal aortic thrombus (?infected) and a 2.6cm splenic lesion (?abscess)  initially on vancomycin (2/28-3/1) and now on cefepime alone (2/28 - )  ID following  -trend WBC  -cont abx

## 2022-03-16 NOTE — BH CONSULTATION LIAISON ASSESSMENT NOTE - NSBHCONSULTPRIMARYDISCUSSYES_PSY_A_CORE FT
discussed the above recommendations with primary team  pending callback from sister for further collateral discussed the above recommendations with primary team  spoke with patient's sister Cristina over phone for collateral

## 2022-03-16 NOTE — CHART NOTE - NSCHARTNOTEFT_GEN_A_CORE
Endocrinology following for thyroid disease     - FT4 suppressed  - c/w monitor off MMI, repeat FT4 and TT3 in 1 week (3/22)

## 2022-03-16 NOTE — PROGRESS NOTE ADULT - ASSESSMENT
66 years old male with PMHx of Stage D HF 2/2 NICM s/p HM2 LVAD in 9/2017 at  (due to severe PAD) with TV ring, multiple GI bleeds, no AC, CKD 3 prior COVID-19 infection in 4/2020, chronic cholecystitis s/p percutaneous cholecystostomy tube, recurrent COVID infection, Serratia bacteremia transferred from [facility] for sepsis.    ID is consulted for septic shock  Recently had Serratia bacteremia discharged on suppressive PO Levaquin  Returned with leukocytosis, fever, AMS, hypotension requiring pressors  ?purulent sputum coming out from previous trach site  CXR no PNA  CTH unrevealing  LVAD exit sites shows no signs of infection  Perc dawn tube site shows no signs of infection  BCx Serratia, S cefepime and ertapenem, R quinolones  CT showed abdominal aortic thrombus and a 2.6cm splenic lesion    Antibiotics:  Vancomycin 2/28 - 3/1  Zosyn 2/28  Cefepime 2/28 -     Currently unclear etiology of septic shock likely secondary to recurrent serratia bacteremia  Source of serratia is could be LVAD, infected aortic thrombus or splenic lesion  Regardless will treat this as in intravascular infection so likely 4 - 6 weeks in total    IMPRESSION:  Septic shock 2/2 serratia bacteremia  Aortic thrombus  Splenic lesion  Leukocytosis  Fever  LVAD      RECOMMENDATIONS:  - Continue IV cefepime 1gm q12hrs  - Follow up blood cultures show evidence of bacteremia clearance    -- will plan a prolonged course of Cefepime for 4-6 weeks, but unlikely to be curative in the setting of 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 abscess  --GOC discussions with family ongoing  -- No absolute contra indication to placing a PEG from an ID perspective    Morris Prater MD  [phone]  After 5pm/weekends [phone]

## 2022-03-16 NOTE — PROGRESS NOTE ADULT - PROBLEM SELECTOR PLAN 5
- history of thyrotoxicosis with amiodarone  - continue mexiletine (can increase dose if needed) and beta blocker as above  - maintain K 4-4.5 and Mg 2-2.5  - EP following

## 2022-03-16 NOTE — PROGRESS NOTE ADULT - SUBJECTIVE AND OBJECTIVE BOX
INFECTIOUS DISEASES FOLLOW UP-- Anitra Prater  [phone]    This is a follow up note for this  66yMale with  Sepsis        ROS:  CONSTITUTIONAL:  No fever, NG feedings  non interactive    Allergies    Amiodarone Hydrochloride (Other (Severe))    Intolerances        ANTIBIOTICS/RELEVANT:  antimicrobials  cefepime   IVPB 1000 milliGRAM(s) IV Intermittent every 12 hours    immunologic:    OTHER:  ascorbic acid 500 milliGRAM(s) Oral daily  chlorhexidine 2% Cloths 1 Application(s) Topical <User Schedule>  cholecalciferol 2000 Unit(s) Oral daily  insulin lispro (ADMELOG) corrective regimen sliding scale   SubCutaneous every 6 hours  metoprolol tartrate 50 milliGRAM(s) Oral two times a day  mexiletine 150 milliGRAM(s) Oral every 8 hours  multivitamin 1 Tablet(s) Oral daily  pantoprazole  Injectable 40 milliGRAM(s) IV Push daily  senna 2 Tablet(s) Oral at bedtime  sertraline 100 milliGRAM(s) Oral daily  sodium chloride 1 Gram(s) Oral three times a day  sodium zirconium cyclosilicate 10 Gram(s) Oral daily  thiamine 100 milliGRAM(s) Oral daily  traMADol 25 milliGRAM(s) Oral once      Objective:  Vital Signs Last 24 Hrs  T(C): 37 (16 Mar 2022 16:39), Max: 37.5 (16 Mar 2022 12:27)  T(F): 98.6 (16 Mar 2022 16:39), Max: 99.5 (16 Mar 2022 12:27)  HR: 72 (16 Mar 2022 16:39) (72 - 137)  BP: 94/67 (16 Mar 2022 16:39) (94/63 - 104/77)  BP(mean): 76 (16 Mar 2022 16:39) (76 - 86)  RR: 18 (16 Mar 2022 16:39) (16 - 19)  SpO2: 100% (16 Mar 2022 16:39) (95% - 100%)    PHYSICAL EXAM:  Constitutional:no acute distress  Eyes:ALONSO, EOMI  Ear/Nose/Throat: no oral lesions, 	  Respiratory: clear BL  Cardiovascular: S1S2 LVAd sounds  Gastrointestinal:cholecystotomy tube, driveline exit dressing CDI  Extremities:no e/e/c  No Lymphadenopathy  IV sites not inflammed.    LABS:                        6.9    12.40 )-----------( 203      ( 16 Mar 2022 09:27 )             22.6     03-16    142  |  106  |  58<H>  ----------------------------<  168<H>  4.2   |  26  |  0.96    Ca    9.9      16 Mar 2022 06:37  Phos  2.7     03-15  Mg     1.9     03-16    TPro  9.4<H>  /  Alb  3.2<L>  /  TBili  0.4  /  DBili  x   /  AST  35  /  ALT  24  /  AlkPhos  207<H>  03-16          MICROBIOLOGY:    Urine Microscopic-Add On (NC) (03.11.22 @ 15:45)    Bacteria: Negative    Epithelial Cells: 6 /hpf    Red Blood Cell - Urine: 33 /hpf    White Blood Cell - Urine: 11 /HPF    Hyaline Casts: 10 /lpf            RECENT CULTURES:      RADIOLOGY & ADDITIONAL STUDIES:    < from: Xray Chest 1 View- PORTABLE-Urgent (Xray Chest 1 View- PORTABLE-Urgent .) (03.16.22 @ 12:36) >  Other: Percutaneous, cholecystostomy tube in place.    Impression:  NG tube tip in descending duodenum.    < end of copied text >

## 2022-03-16 NOTE — PROVIDER CONTACT NOTE (OTHER) - ACTION/TREATMENT ORDERED:
Blood paused. CLAUDIA Rabago made aware and at bedside. Verbal order to resume blood and recheck temperature in 15 minutes.

## 2022-03-16 NOTE — PROGRESS NOTE ADULT - PROBLEM SELECTOR PLAN 1
suspect secondary to infection with diffuse cerebral dysfunction.   CT head x 2 negative for acute infarct  unable to do MRI d/t LVAD  EEG negative for seizure activity  neuro recs noted  ammonia low, B1 nml  home gabapentin discontinued given lethargy  GOC discussion re: end of life care and feeding tube ongoing - requested GI eval for PEG.  plan for family meeting 3/17 with palliative - ?time

## 2022-03-16 NOTE — CHART NOTE - NSCHARTNOTEFT_GEN_A_CORE
D/w Dr. Vu that indicated, at this point, lytes are optimized and that no new arrhythmias have been documented. D/w ID, Dr. Anitra Prater, that stated, at this point the paint is not bacteremic and that, if anything, from the infectious point of view, before he becomes bacteremic again, this is the best time to do an intervention. I also d/w Heart Failure, Dr. Martinez, that indicated based on the primary team's and ID' inputs and the patient's cardiac state that they will also clear the patient for PEG placement. At this point, I will suggest GI re-eval for PEG. Now, it will be up to GI to determine if this procedure is possible or not.     D/w Dr. Vu.         Francisco Burgos MD   Geriatrics and Palliative Care (GAP) Consult Service    of Geriatric and Palliative Medicine  [facility]      Please page the following number for clinical matters between the hours of 9 am and 5 pm from Monday through Friday : [phone]    After 5pm and on weekends, please see the contact information below:    In the event of newly developing, evolving, or worsening symptoms, please contact the Palliative Medicine team via pager (if the patient is at [facility] #[phone] or if the patient is at [facility] #[phone]) The Geriatric and Palliative Medicine service has coverage 24 hours a day/ 7 days a week to provide medical recommendations regarding symptom management needs via telephone D/w Dr. Vu that indicated, at this point, lytes are optimized and that no new arrhythmias have been documented. D/w ID, Dr. Anitra Prater, that stated, at this point the paint is not bacteremic and that, if anything, from the infectious point of view, before he becomes bacteremic again, this is the best time to do an intervention. I also d/w Heart Failure, Dr. Martinez, that indicated based on the primary team's and ID' inputs and the patient's cardiac state that they will also clear the patient for PEG placement. At this point, I will suggest GI re-eval for PEG. Now, it will be up to GI to determine if this procedure is possible or not. Will appreciate if GI input can be provided before the f/u FM that is scheduled for tomorrow between 12-2pm.     D/w Dr. Vu.         Francisco Burgos MD   Geriatrics and Palliative Care (GAP) Consult Service    of Geriatric and Palliative Medicine  [facility]      Please page the following number for clinical matters between the hours of 9 am and 5 pm from Monday through Friday : [phone]    After 5pm and on weekends, please see the contact information below:    In the event of newly developing, evolving, or worsening symptoms, please contact the Palliative Medicine team via pager (if the patient is at [facility] #[phone] or if the patient is at [facility] #[phone]) The Geriatric and Palliative Medicine service has coverage 24 hours a day/ 7 days a week to provide medical recommendations regarding symptom management needs via telephone

## 2022-03-16 NOTE — PROGRESS NOTE ADULT - ASSESSMENT
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 s/p HM2 LVAD in 9/2017 at  (due to severe PAD) with TV ring, h/o mid-SB bleeding on VCE (negative on PUSH 6/15/21), subsequently maintained off AC, CKD 3, prior COVID-19 infection in 4/2020, recurrent syncope post LVAD s/p ILR, s/p prolonged complex hospitalization from 6/14-11/16/2021 initially admitted at that time with abdominal pain s/p VCE/PUSH 6/15/21, respiratory failure s/p trach, multiple infections, s/p cholecystotomy tube and SMA stent 10/2021, TEF, ultimately discharged to [facility] rehab and then returned to [facility] for trach decannulation 12/2, readmitted in 2/2022 with recurrent COVID-19 infection, initially in distributive shock with serratia marcesens, with declining condition and more lethargic/less responsive with hyponatremia, hyperkalemia, bacteremia with unknown source, multiple runs of VT (3/11) with NGT in place, GI consulted for PEG evaluation.      #. Evaluation for PEG placement 2/2 dysphagia c/b TEF with NGT in place  #. Hyponatremia, hyperkalemia  #. Recurrent bacteremia c/b serratia marcesens of unclear etiology  #. TEF - evaluated by ENT with plans to close when medically optimized and stable    Recommendations:  - Not an appropriate candidate for PEG placement at this time; not medically optimized, hyponatremic/hyperkalemic and unclear source of recurrent bacteremia with cardiac arrhythmias  - Note, PEG placement has not been shown to provide significant prolongation of life and still presents with risk of aspiration   - c/w NGT feeds as tolerated  - GOC discussion  - Daily CBC, BMP, correct derangements  - Transfuse if Hgb < 8, consider cardiac history  - Ensure adequate hydration  - Cardiology, EP, ID on board; appreciate recs  - Rest of care per primary    Thank you for involving us in this patient's care. Please re-consult GI when patient is medically optimized with cardiac clearance and GI will re-assess the patient for endoscopic PEG placement if within GOC.    Note not final until Attending signs.    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 s/p HM2 LVAD in 9/2017 at  (due to severe PAD) with TV ring, h/o mid-SB bleeding on VCE (negative on PUSH 6/15/21), subsequently maintained off AC, CKD 3, prior COVID-19 infection in 4/2020, recurrent syncope post LVAD s/p ILR, s/p prolonged complex hospitalization from 6/14-11/16/2021 initially admitted at that time with abdominal pain s/p VCE/PUSH 6/15/21, respiratory failure s/p trach, multiple infections, s/p cholecystotomy tube and SMA stent 10/2021, TEF, ultimately discharged to [facility] rehab and then returned to [facility] for trach decannulation 12/2, readmitted in 2/2022 with recurrent COVID-19 infection, initially in distributive shock with serratia marcesens, with declining condition and more lethargic/less responsive with hyponatremia, hyperkalemia, bacteremia with unknown source, multiple runs of VT (3/11) with NGT in place, GI consulted for PEG evaluation.      #. Evaluation for PEG placement 2/2 dysphagia c/b TEF with NGT in place  #. Hyponatremia, hyperkalemia  #. Recurrent bacteremia c/b serratia marcesens of unclear etiology  #. TEF - evaluated by ENT with plans to close when medically optimized and stable    Recommendations:  - Possible PEG placement pending cardiac/ID documented clearance and family GOC discussion   - Note, PEG placement has not been shown to provide significant prolongation of life and still presents with risk of aspiration   - c/w NGT feeds as tolerated  - GOC discussion  - Daily CBC, BMP, correct derangements  - Transfuse if Hgb < 8, consider cardiac history  - Ensure adequate hydration  - Cardiology, EP, ID on board; appreciate recs  - Rest of care per primary    Thank you for involving us in this patient's care.      Note not final until Attending signs.    Aleksandra Kelly MD  Gastroenterology/Hepatology Fellow, PGY-V    NON-URGENT CONSULTS:  Please email [email or web address]  AT NIGHT AND ON WEEKENDS:  Contact on-call GI fellow via answering service ([phone]) from 5pm-8am and on weekends/holidays  MONDAY-FRIDAY 8AM-5PM:  Pager# [phone] ([facility])  GI Phone# [phone] ([facility])

## 2022-03-16 NOTE — PROGRESS NOTE ADULT - ATTENDING COMMENTS
67 y/o M with HM3 LVAD, well known to our service. Admitted with sepsis and AMS. Found to have recurrent bacteremia, now on appropriate antibiotics. Remains non-verbal, intermittently opening eyes and squeezing hands, but not consistent.   Ask Psychiatry to see patient--perhaps some of the psych medications are contributing to AMS.  For now, continue treatment of bacteremia. Source of bacteremia is LVAD. Appreciate ID involvement.  Had been having runs of NSVT, improved on beta-blocker therapy and continued Mexiletine. No VT recently.  Ongoing discussions with Palliative Care and family with respect to goals of care. Family meeting on Thursday. Currently being fed through NG tube, GI deferring PEG placement for now.     Prognosis guarded.

## 2022-03-16 NOTE — BH CONSULTATION LIAISON ASSESSMENT NOTE - RISK ASSESSMENT
prolonged hospital course in a patient with several metabolic disturbances with prior neurological workup negative

## 2022-03-16 NOTE — PROGRESS NOTE ADULT - ASSESSMENT
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    S/p prolonged complex hospitalization from 6/14-11/16/2021 initially admitted with abdominal pain complicated by GIB, respiratory failure s/p trach, multiple infections, s/p cholecystotomy tube and SMA stent 10/2021, ultimately discharged to [email or web address]hab and then returned to [facility] for trach decannulation 12/2. The patient had a recent admission with COVID 19 reinfection and distributive shock. That admission he had blood culture positive for serratia marcescens (discharged on levaquin).  He was treated with MAB, remdesivir, and steroids. He had recurrent VT and was started mexiletine.     Now coming in from [facility] ED with leukocytosis and AMS was treated for UTI. Initially required bipap but was weaned off here. Labs concerning here for WBC 26, hemoglobin of 7s then 6.7, creatinine of 1.3. His maps were initially in the 50s. Physical exam showing pus from stoma, sat of central access of 81.8, and tachycardia are all concerning for septic shock picture. He is s/p 1L fluids, 1uPRBCs, was placed on vaso. Patient has known episodes of VT and had recurrent NSVT/VT when he first arrived started on lidocaine infusion with improvement. Of note he was previously on mexiletine 150 TID outpatient, metoprolol ER 25 qAM and 50 qPM. Previous history of thyrotoxicosis on amiodarone.    He was found to be bacteremic with recurrent serratia, on IV abx possibly r/t LVAD infection vs splenic abscess noted on CT 1/19. Afebrile since 3/1. Vasopressin weaned off 3/4, started on midodrine which has now been off since 3/8. LVAD without s/s of pump malfunction. Currently he opens his eyes however remains nonverbal and not following commands. His mental status is still not back to baseline. His VT has now subsided, last episode was noted on 3/12.  His hyponatremia and hyperkalemia are overall improving after receiving the following treatment insulin/D50 and lokelma.  Recently his hemoglobin has continued to drop, will be transfused with 1 uPRBC on 3/16. Overall fairly stable from HF perspective. 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 Currently patient is not a candidate for PEG placement. Family meeting has been arranged for tomorrow to discuss GOC and next steps.

## 2022-03-16 NOTE — PROGRESS NOTE ADULT - SUBJECTIVE AND OBJECTIVE BOX
Subjective: Patient seen and examined resting in bed.     Medications:  ascorbic acid 500 milliGRAM(s) Oral daily  cefepime   IVPB 1000 milliGRAM(s) IV Intermittent every 12 hours  chlorhexidine 2% Cloths 1 Application(s) Topical <User Schedule>  cholecalciferol 2000 Unit(s) Oral daily  insulin lispro (ADMELOG) corrective regimen sliding scale   SubCutaneous every 6 hours  metoprolol tartrate 50 milliGRAM(s) Oral two times a day  mexiletine 150 milliGRAM(s) Oral every 8 hours  mirtazapine 7.5 milliGRAM(s) Oral at bedtime  multivitamin 1 Tablet(s) Oral daily  pantoprazole  Injectable 40 milliGRAM(s) IV Push daily  senna 2 Tablet(s) Oral at bedtime  sertraline 100 milliGRAM(s) Oral daily  sodium chloride 1 Gram(s) Oral three times a day  sodium zirconium cyclosilicate 10 Gram(s) Oral daily  thiamine 100 milliGRAM(s) Oral daily  traMADol 25 milliGRAM(s) Oral once    Vitals:  Vital Signs Last 24 Hours  T(C): 37.3 (22 @ 13:16), Max: 37.5 (22 @ 12:27)  HR: 122 (22 @ 13:16) (102 - 137)  BP: 94/63 (22 @ 13:16) (94/63 - 101/73)  RR: 18 (22 @ 13:16) (16 - 18)  SpO2: 100% (22 @ 13:16) (95% - 100%)    Weight in k.9 ( @ 08:43)    I&O's Summary    15 Mar 2022 07:01  -  16 Mar 2022 07:00  --------------------------------------------------------  IN: 0 mL / OUT: 1450 mL / NET: -1450 mL        Physical Exam  General: No distress.  Neck: JVP not elevated   Chest: Clear to auscultation bilaterally  CV: +VAD hum  Abdomen: Soft, non-distended, non-tender  Neurology: A&Ox 1    LVAD Interrogation  VAD TYPE HM 2  Speed 9200  Flow 6.1  Power 6  PI  6.1  Assessment of driveline exit site: driveline dressing c/d/i  Programming changes: no changes made    Labs:                        6.9    12.40 )-----------( 203      ( 16 Mar 2022 09:27 )             22.6     03-16    142  |  106  |  58<H>  ----------------------------<  168<H>  4.2   |  26  |  0.96    Ca    9.9      16 Mar 2022 06:37  Phos  2.7     03-15  Mg     1.9     -16    TPro  9.4<H>  /  Alb  3.2<L>  /  TBili  0.4  /  DBili  x   /  AST  35  /  ALT  24  /  AlkPhos  207<H>  -16              Lactate Dehydrogenase, Serum: 178 U/L (03-15 @ 16:17)

## 2022-03-16 NOTE — PROGRESS NOTE ADULT - PROBLEM SELECTOR PLAN 1
- 2/28 BCx + serratia/GNR  - IV abx per ID. currently on cefepime.  - CT C/A/P showing small bilateral pleural effusions, small volume ascites, which is new, decreased splenic lesion, aortic thrombus narrowing the lumen at least 505 at the infrarenal segment, increased from prior  - home mirtazapine and gabapentin discontinued given lethargy  - appreciate palliative care/GI  input regarding GOC and PEG placement. Currently patient is not a candidate for PEG placement. Family meeting arranged for this week to discuss GOC, schedule for Thursday 3/17  - will follow ID recs regarding PICC line placement

## 2022-03-16 NOTE — BH CONSULTATION LIAISON ASSESSMENT NOTE - NSBHCHARTREVIEWVS_PSY_A_CORE FT
Vital Signs Last 24 Hrs  T(C): 37.3 (16 Mar 2022 13:16), Max: 37.5 (16 Mar 2022 12:27)  T(F): 99.2 (16 Mar 2022 13:16), Max: 99.5 (16 Mar 2022 12:27)  HR: 122 (16 Mar 2022 13:16) (102 - 137)  BP: 94/63 (16 Mar 2022 13:16) (94/63 - 101/73)  BP(mean): 78 (16 Mar 2022 13:18) (76 - 84)  RR: 18 (16 Mar 2022 13:16) (16 - 18)  SpO2: 100% (16 Mar 2022 13:16) (95% - 100%)

## 2022-03-17 NOTE — PROGRESS NOTE ADULT - PROBLEM SELECTOR PLAN 1
- 2/28 BCx + serratia/GNR  - WBC remains elevated, however afebrile. Plan to send new cultures today  - IV abx per ID. currently on cefepime.  - CT C/A/P showing small bilateral pleural effusions, small volume ascites, which is new, decreased splenic lesion, aortic thrombus narrowing the lumen at least 505 at the infrarenal segment, increased from prior  - will be obtaining repeat CT C/A/P today to rule out worsening infection, please follow up with read  - palliative care following, family meeting held on 3/17. At this time the family wishes to continue with course of treatment and would like PEG to be placed. No absolute contra indication to placing a PEG from an cardiac perspective

## 2022-03-17 NOTE — PROGRESS NOTE ADULT - SUBJECTIVE AND OBJECTIVE BOX
Subjective: Patient seen and examined resting in bed. Family  meeting arranged for today    Medications:  ascorbic acid 500 milliGRAM(s) Oral daily  cefepime   IVPB 1000 milliGRAM(s) IV Intermittent every 12 hours  chlorhexidine 2% Cloths 1 Application(s) Topical <User Schedule>  cholecalciferol 2000 Unit(s) Oral daily  insulin lispro (ADMELOG) corrective regimen sliding scale   SubCutaneous every 6 hours  metoprolol tartrate 50 milliGRAM(s) Oral two times a day  mexiletine 150 milliGRAM(s) Oral every 8 hours  multivitamin 1 Tablet(s) Oral daily  pantoprazole  Injectable 40 milliGRAM(s) IV Push daily  senna 2 Tablet(s) Oral at bedtime  sertraline 100 milliGRAM(s) Oral daily  sodium chloride 1 Gram(s) Oral three times a day  sodium zirconium cyclosilicate 10 Gram(s) Oral daily  thiamine 100 milliGRAM(s) Oral daily  traMADol 25 milliGRAM(s) Oral once      Vitals:  Vital Signs Last 24 Hours  T(C): 36.8 (22 @ 04:49), Max: 37.5 (22 @ 12:27)  HR: 139 (22 @ 04:49) (72 - 139)  BP: 94/67 (22 @ 16:39) (94/63 - 104/77)  RR: 20 (22 @ 04:49) (16 - 20)  SpO2: 100% (22 @ 04:49) (98% - 100%)    Weight in k.9 ( @ 08:43)    I&O's Summary    16 Mar 2022 07:01  -  17 Mar 2022 07:00  --------------------------------------------------------  IN: 300 mL / OUT: 940 mL / NET: -640 mL      Physical Exam  General: No distress.   Neck: Neck supple. JVP not elevated. No masses  Chest: Clear to auscultation bilaterally  CV: +VAD hun  Abdomen: Soft, non-distended, non-tender  Neurology: eyes open, does not follow commands and remains non verbal     LVAD Interrogation  VAD TYPE HM 2  Speed 9200  Flow 5.9  Power 5.3  PI 6.3  Assessment of driveline exit site: driveline dressing c/d/i  Programming changes: no changes made    Labs:                        8.0    14.29 )-----------( 184      ( 16 Mar 2022 20:30 )             25.8     -    148<H>  |  110<H>  |  60<H>  ----------------------------<  159<H>  4.7   |  26  |  1.06    Ca    10.3      17 Mar 2022 06:39  Mg     2.1         TPro  9.9<H>  /  Alb  3.6  /  TBili  0.5  /  DBili  x   /  AST  65<H>  /  ALT  40  /  AlkPhos  242<H>                Lactate Dehydrogenase, Serum: 178 U/L (03-15 @ 16:17)          Imaging Studies

## 2022-03-17 NOTE — RAPID RESPONSE TEAM SUMMARY - NSADDTLFINDINGSRRT_GEN_ALL_CORE
On arrival, pt AOx0, appears chronically ill, at baseline mental status as per primary nurse. Febrile to 100.4 F oral, HR sustaining at 150 (baseline 100-130), MAP 70, RR 20s % 2L NC. EKG was poor quality but showed sinus tachycardia. Pt treated with tylenol 1 g IV,  cc IVF, and broadened antibiotics to meropenem and vancomycin. Blood cultures drawn earlier today. ABG done showed pO2 169, less likely PE. Hb also stable on ABG, unlikely to be bleed as etiology. Pt with sepsis as likely etiology of tachycardia. After above interventions, pt's HR improved to low 140s, compensatory in the setting of sepsis.

## 2022-03-17 NOTE — PROGRESS NOTE ADULT - ASSESSMENT
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 s/p HM2 LVAD in 9/2017 at  (due to severe PAD) with TV ring, h/o mid-SB bleeding on VCE (negative on PUSH 6/15/21), subsequently maintained off AC, CKD 3, prior COVID-19 infection in 4/2020, recurrent syncope post LVAD s/p ILR, s/p prolonged complex hospitalization from 6/14-11/16/2021 initially admitted at that time with abdominal pain s/p VCE/PUSH 6/15/21, respiratory failure s/p trach, multiple infections, s/p cholecystotomy tube and SMA stent 10/2021, TEF, ultimately discharged to [facility] rehab and then returned to [facility] for trach decannulation 12/2, readmitted in 2/2022 with recurrent COVID-19 infection, initially in distributive shock with serratia marcesens, with declining condition and more lethargic/less responsive with hyponatremia, hyperkalemia, bacteremia with unknown source, multiple runs of VT (3/11) with NGT in place, GI consulted for PEG evaluation.      #. Evaluation for PEG placement 2/2 dysphagia c/b TEF with NGT in place  #. Hypernatremia  #. Recurrent bacteremia c/b serratia marcesens of unclear etiology  #. TEF - evaluated by ENT with plans to close when medically optimized and stable    Recommendations:  - Plan for endoscopic PEG placement tomorrow if no acute changes in the patient's condition and hypernatremia improving  - Per ID/Cards: no absolute contraindication to PEG placement  - Note, PEG placement has not been shown to provide significant prolongation of life and still presents with risk of aspiration   - Hold TFs after midnight  - Repeat covid19 pcr test; needs test within 5 days prior to procedure  - Check early AM CBC, BMP, correct electrolytes  - Transfuse if Hgb < 8, consider cardiac history  - Ensure adequate hydration and medical optimization  - Cardiology, EP, ID on board; appreciate recs  - Rest of care per primary    Thank you for involving us in this patient's care.      Note not final until Attending signs.    Aleksandra Kelly MD  Gastroenterology/Hepatology Fellow, PGY-V    NON-URGENT CONSULTS:  Please email [email or web address]  [email or web address]  AT NIGHT AND ON WEEKENDS:  Contact on-call GI fellow via answering service ([phone]) from 5pm-8am and on weekends/holidays  MONDAY-FRIDAY 8AM-5PM:  Pager# [phone] ([facility])  GI Phone# [phone] ([facility])   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 s/p HM2 LVAD in 9/2017 at  (due to severe PAD) with TV ring, h/o mid-SB bleeding on VCE (negative on PUSH 6/15/21), subsequently maintained off AC, CKD 3, prior COVID-19 infection in 4/2020, recurrent syncope post LVAD s/p ILR, s/p prolonged complex hospitalization from 6/14-11/16/2021 initially admitted at that time with abdominal pain s/p VCE/PUSH 6/15/21, respiratory failure s/p trach, multiple infections, s/p cholecystotomy tube and SMA stent 10/2021, TEF, ultimately discharged to [facility] rehab and then returned to [facility] for trach decannulation 12/2, readmitted in 2/2022 with recurrent COVID-19 infection, initially in distributive shock with serratia marcesens, with declining condition and more lethargic/less responsive with hyponatremia, hyperkalemia, bacteremia with unknown source, multiple runs of VT (3/11) with NGT in place, GI consulted for PEG evaluation.    #. Evaluation for PEG placement 2/2 dysphagia c/b TEF with NGT in place  #. Hypernatremia  #. Recurrent bacteremia c/b serratia marcesens of unclear etiology  #. TEF - evaluated by ENT with plans to close when medically optimized and stable    Recommendations:  - Plan for endoscopic PEG placement tomorrow if no acute changes in the patient's condition and hypernatremia improving  - Per ID/HF: no absolute contraindication to PEG placement, but HF recommending cardiac anesthesia clearance; please obtain documented clearance  - Note, PEG placement has not been shown to provide significant prolongation of life and still presents with risk of aspiration   - Hold TFs after midnight  - Repeat covid19 pcr test; needs test within 5 days prior to procedure  - Check early AM CBC, BMP, correct electrolytes  - Transfuse if Hgb < 8, consider cardiac history  - Ensure adequate hydration and medical optimization  - Cardiology, EP, ID on board; appreciate recs  - Rest of care per primary    Thank you for involving us in this patient's care.      Note not final until Attending signs.    Aleksandra Kelly MD  Gastroenterology/Hepatology Fellow, PGY-V    NON-URGENT CONSULTS:  Please email [email or web address]  [email or web address]  AT NIGHT AND ON WEEKENDS:  Contact on-call GI fellow via answering service ([phone]) from 5pm-8am and on weekends/holidays  MONDAY-FRIDAY 8AM-5PM:  Pager# [phone] ([facility])  GI Phone# [phone] ([facility])

## 2022-03-17 NOTE — PROGRESS NOTE ADULT - PROBLEM SELECTOR PLAN 3
- continue Lopressor 50 mg PO BID  - holding carlos given hyperkalemia. Remains on Lokelma 10mg daily.  - maintain I=O

## 2022-03-17 NOTE — PROGRESS NOTE ADULT - ASSESSMENT
Assessment and Recommendation:   · Assessment	  Assessment and recommendation :  Acute hypercapnic respiratory Failure on 3 liter on nasal cannula    S/P Septic shock   Bacteremia with serratia marcescens  Severe anemia with low H/H   S/P transfuse one unit of Packed RBCs   Severe ischemic cardiomyopathy   ACC/AHA stage D systolic heart failure  severe ischemic cardiomyopathy EF 10%  Peripheral vascular Disease   PAF on no ACO   H/O of repeated GI bleeding   S/P mesenteric ischemia   GERD on Reglan   S/P HM2 LVAD , VT on Mexiletine   hyperthyroidism on  methimazole   severe protein caloric malnutrition   severe neuropathy   Major depression   S/P tracheostomy X2  Continue Antibiotic cefepime   NG tube feeding   GI prophylaxis   patient is DNR/DNI

## 2022-03-17 NOTE — CHART NOTE - NSCHARTNOTEFT_GEN_A_CORE
Briefly, this is a 67yo man with PMH non-ischemic cardiomyopathy s/p LVAD, multiple GI bleeds, CKD, Covid-19 infections, recurrent serratia bacteriemia with persistent AMS. Decreased LOC with non-localizing exam. Mental status has been the same.    IMPRESSION: Decreased LOC in s/o bacteremia likely 2/2 diffuse cerebral dysfunction. Etiology uncertain, likely r/o infectious encephalopathy. EEG negative for seizure activity. Expect poor functional recovery from neurological standpoint. Briefly, this is a 67yo man with PMH non-ischemic cardiomyopathy s/p LVAD, multiple GI bleeds, CKD, Covid-19 infections, recurrent Serratia bacteriemia with persistent AMS. Decreased LOC with non-localizing exam. Mental status has been unchanged.    IMPRESSION: Decreased level of consciousness in s/o bacteremia likely 2/2 diffuse cerebral dysfunction. Etiology likely infectious encephalopathy.     EEG negative for seizure activity. CT head with no interval changes. Patient unable to get MRI brain due to LVAD. Would recommend antithrombotic therapy only when medically safe.    Expect poor functional recovery from neurological standpoint.    Discussed with neurology attending, Dr. Lis Villareal.    Please call [phone] for any further questions/concerns.    Wanda Harris, DO  PGY-3  Neurology Resident

## 2022-03-17 NOTE — PROGRESS NOTE ADULT - PROBLEM SELECTOR PLAN 9
- currently being fed via keotube  - at this time patient is unable to tolerate PO diet. Family wishes to proceed with PEG placement  - GI following, will need to determine date of PEG placement (of note will need to be booked with cardiac anesthesia.  - No absolute contra indication to placing a PEG from an cardiac perspective

## 2022-03-17 NOTE — PROGRESS NOTE ADULT - SUBJECTIVE AND OBJECTIVE BOX
RICKY JOINT  MRN#: [id number]  Subjective: pulmonary progress note  : acute hypercapnic respiratory failure . Septic shock , service rendered on 2002   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 s/p HM2 LVAD in 2017 at  (due to severe PAD) with TV ring, multiple GI bleeds, thus maintained off AC, CKD 3prior COVID-19 infection in 2020, recurrent syncope post LVAD s/p ILR, s/p prolonged complex hospitalization from -2021 initially admitted with abdominal pain complicated by GIB, respiratory failure s/p trach, multiple infections, s/p cholecystotomy tube and SMA stent 10/2021, ultimately discharged to [facility] rehab and then returned to [facility] for trach decannulation , readmitted in 2022 with recurrent COVID-19 infection, initially in distributive shock. Blood cultures were also positive for serratia marcescens which he has had in the past. seen by heart failure team patient is DNR/DNI  continue on 3 liter nasal canula flat Affect , hyperkalemia lokelma , runs of V-Tach. more stable  very weak , non verbal non communicative on tube feeding , leg booting          (2022 22:20)    PAST MEDICAL & SURGICAL HISTORY:  CHF (congestive heart failure)    CAD (coronary artery disease)  Depression    Pleural effusion    History of 2019 novel coronavirus disease (COVID-19)  2020    Hemorrhoids    Bleeding hemorrhoids    Peripheral arterial disease    Claudication    BPH with urinary obstruction    ACC/AHA stage D systolic heart failure    Anticoagulation goal of INR 2.0 to 2.5    Falls    Clavicle fracture    CKD (chronic kidney disease), stage III    Iron deficiency anemia    H/O epistaxis    Vertigo    GI bleed    S/P TVR (tricuspid valve repair)    S/P ventricular assist device    S/P endoscopy    H/O tracheostomy        FAMILY HISTORY:    Social History:    Marital Status:  X    (   ) Single    (   )    (  )   Occupation: Retired   Lives with: (  ) alone  (  ) children  X spouse   (  ) parents  (  ) other    Substance Use (street drugs): X never used  (  ) other:  Tobacco Usage: X never smoked   (   ) former smoker   (   ) current smoker  (     ) pack year  (    ) last cigarette date  Alcohol Usage: Social         OBJECTIVE:  ICU Vital Signs Last 24 Hrs  T(C): 37.2 (01 Mar 2022 16:00), Max: 38.4 (01 Mar 2022 09:00)  T(F): 99 (01 Mar 2022 16:00), Max: 101.1 (01 Mar 2022 09:00)  HR: 109 (01 Mar 2022 18:00) (67 - 124)  BP: --  BP(mean): 86 (2022 20:00) (86 - 86)  ABP: 89/74 (01 Mar 2022 18:00) (75/63 - 160/85)  ABP(mean): 81 (01 Mar 2022 18:00) (68 - 139)  RR: 38 (01 Mar 2022 18:00) (5 - 40)  SpO2: 99% (01 Mar 2022 18:00) (94% - 100%)       @ 07: @ 07:00  --------------------------------------------------------  IN: 1060.7 mL / OUT: 1600 mL / NET: -539.3 mL     @ 07:01   @ 18:22  --------------------------------------------------------  IN: 677.8 mL / OUT: 455 mL / NET: 222.8 mL    PHYSICAL EXAM :Daily Height in cm: 177.8 (2022 21:20)  Elderly frail male on 3 liter nasal cannula    Daily Weight in k.7 (2022 21:20)  HEENT:     + NCAT  + EOMI  - Conjuctival edema   - Icterus   - Thrush   - ETT  - NGT/OGT  Neck:         + FROM RT IJ  JVD     - Nodes     - Masses    + Mid-line trachea   - Tracheostomy  Chest:           mild kyphosis   Lungs:          + CTA  + Rhonchi  + Rales    - Wheezing  + Decreased BS   - Dullness R L  Cardiac:       + S1 + S2    + RRR   - Irregular   - S3  - S4    - Murmurs   - Rub   - Hamman’s sign   Abdomen:    + BS     + Soft    + Non-tender  - Distended  - Organomegaly  - PEG Cholecystostomy tube in place   Extremities:   - Cyanosis U/L   - Clubbing  U/L  - LE/UE Edema   + Capillary refill    + Pulses   Neuro:        + Awake   +  Alert   - Confused   - Lethargic   - Sedated   - Generalized Weakness  Skin:        - Rashes    - Erythema   + Normal incisions   + IV sites intact  -      HOSPITAL MEDICATIONS: All mediciations reviewed and analyzed  MEDICATIONS  (STANDING):  albumin human  5% IVPB 250 milliLiter(s) IV Intermittent once  cefepime   IVPB 2000 milliGRAM(s) IV Intermittent every 12 hours  chlorhexidine 2% Cloths 1 Application(s) Topical <User Schedule>  cholecalciferol 1000 Unit(s) Oral daily  dextrose 40% Gel 15 Gram(s) Oral once  dextrose 50% Injectable 25 Gram(s) IV Push once  gabapentin Solution 100 milliGRAM(s) Oral three times a day  glucagon  Injectable 1 milliGRAM(s) IntraMuscular once  insulin lispro (ADMELOG) corrective regimen sliding scale   SubCutaneous every 6 hours  magnesium sulfate  IVPB 1 Gram(s) IV Intermittent once  methimazole 10 milliGRAM(s) Oral daily  metoclopramide 10 milliGRAM(s) Oral four times a day  mexiletine 150 milliGRAM(s) Oral every 8 hours  mirtazapine 7.5 milliGRAM(s) Oral at bedtime  multivitamin 1 Tablet(s) Oral daily  pantoprazole  Injectable 40 milliGRAM(s) IV Push daily  potassium phosphate IVPB 15 milliMole(s) IV Intermittent once  senna 2 Tablet(s) Oral at bedtime  sertraline 100 milliGRAM(s) Oral daily  vancomycin  IVPB 750 milliGRAM(s) IV Intermittent every 24 hours  vasopressin Infusion 0.02 Unit(s)/Min (1.2 mL/Hr) IV Continuous <Continuous>    MEDICATIONS  (PRN):    LABS: All Lab data reviewed and analyzed                        6.9    1240 )-----------( 203      ( 16 Mar 2022 09:27 )             22.6   03-16    142  |  106  |  58<H>  ----------------------------<  168<H>  4.2   |  26  |  0.96    Ca    9.9      16 Mar 2022 06:37  Phos  2.7     03-15  Mg     1.9     -16    TPro  9.4<H>  /  Alb  3.2<L>  /  TBili  0.4  /  DBili  x   /  AST  35  /  ALT  24  /  AlkPhos  207<H>  -    Ca    9.8      12 Mar 2022 06:34  Mg     2.1     03-12      Ca    9.8      12 Mar 2022 06:34  Phos  3.9     03-11  Mg     2.1     03-12    TPro  9.8<H>  /  Alb  3.5  /  TBili  0.4  /  DBili  x   /  AST  36  /  ALT  20  /  AlkPhos  170<H>  -             PTT - ( 01 Mar 2022 00:08 )  PTT:25.0 sec LIVER FUNCTIONS - ( 01 Mar 2022 17:38 )  Alb: 2.9 g/dL / Pro: 7.3 g/dL / ALK PHOS: 116 U/L / ALT: 30 U/L / AST: 29 U/L / GGT: x           RADIOLOGY: - Reviewed and analyzed

## 2022-03-17 NOTE — PROGRESS NOTE ADULT - ASSESSMENT
66 years old male with PMHx of Stage D HF 2/2 NICM s/p HM2 LVAD in 9/2017 at  (due to severe PAD) with TV ring, multiple GI bleeds, no AC, CKD 3 prior COVID-19 infection in 4/2020, chronic cholecystitis s/p percutaneous cholecystostomy tube, recurrent COVID infection, Serratia bacteremia transferred from [facility] for sepsis.    ID is consulted for septic shock  Recently had Serratia bacteremia discharged on suppressive PO Levaquin  Returned with leukocytosis, fever, AMS, hypotension requiring pressors  ?purulent sputum coming out from previous trach site  CXR no PNA  CTH unrevealing  LVAD exit sites shows no signs of infection  Perc dawn tube site shows no signs of infection  BCx Serratia, S cefepime and ertapenem, R quinolones  CT showed abdominal aortic thrombus and a 2.6cm splenic lesion    Antibiotics:  Vancomycin 2/28 - 3/1  Zosyn 2/28  Cefepime 2/28 -     Currently unclear etiology of septic shock likely secondary to recurrent serratia bacteremia  Source of serratia is could be LVAD, infected aortic thrombus or splenic lesion  Regardless will treat this as in intravascular infection so likely 4 - 6 weeks in total    IMPRESSION:  Septic shock 2/2 serratia bacteremia  Aortic thrombus  Splenic lesion  Leukocytosis  Fever  LVAD      RECOMMENDATIONS:  Patient with new fevers, tachycardia, tachypnea    differential aspiration event in the setting of NG feeds, bacteremia secondary to a line source, COVID acquisition intra-abdominal process    Send blood cultures x2 sets  hold NG feeds  RVP testing  CT of chest abdomen and pelvis without contrast  Discontinue Cefepime  Start Vancomycin 15mg/kg q 12hr plus meropenem 1 gram IV q 8hr    Please hold off on PEG placement    Discussed with heart failure team    Morris Prater MD  Can be called via Teams  After 5pm/weekends [phone]

## 2022-03-17 NOTE — RAPID RESPONSE TEAM SUMMARY - NSSITUATIONBACKGROUNDRRT_GEN_ALL_CORE
The patient is a 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 s/p HM2 LVAD in 9/2017 at  (due to severe PAD) with TV ring, multiple GI bleeds, thus maintained off AC, prior COVID-19 infection in 4/2020, recurrent syncope post LVAD s/p ILR, prolonged complex hospitalization from 6/14-11/16/2021 with GIB, respiratory failure s/p trach decanulated 12/2021, multiple infections, s/p cholecystotomy tube and SMA stent 10/2021, recurrent COVID 19 reinfection s/p MAB/remdesivir/steroids and distributive shock with serratia bacteremia, recurrent VT on mexiletine transferred from [facility] ED with metabolic encephalopathy, hypercarbic respiratory failure weaned off bipap, septic shock requiring pressors (now on midodrine) with serratia bacteremia on cefepime, intermittent h/h drop s/p total 3units prbc (last 3/6), episodes of recurrent VT s/p lidocaine gtt now back on mexiletine. RRT called for tachycardia to the 150s.

## 2022-03-17 NOTE — RAPID RESPONSE TEAM SUMMARY - NSOTHERINTERVENTIONSRRT_GEN_ALL_CORE
- C/w ongoing GOC conversations with family given poor prognosis  - Obtained labs (CBC, CMP, ABG with lactate, cardiac enzymes)   - C/w ongoing GOC conversations with family given poor prognosis

## 2022-03-17 NOTE — PROGRESS NOTE ADULT - SUBJECTIVE AND OBJECTIVE BOX
INFECTIOUS DISEASES FOLLOW UP-- Anitrajoão Prater  [phone]    This is a follow up note for this  66yMale with  Sepsis  patient with fever, tachycardia and tachypnea this afternoon        ROS:  CONSTITUTIONAL: awake, tachypneic    Allergies    Amiodarone Hydrochloride (Other (Severe))    Intolerances        ANTIBIOTICS/RELEVANT:  antimicrobials  meropenem  IVPB      meropenem  IVPB 1000 milliGRAM(s) IV Intermittent every 8 hours  vancomycin  IVPB        immunologic:    OTHER:  ascorbic acid 500 milliGRAM(s) Oral daily  chlorhexidine 2% Cloths 1 Application(s) Topical <User Schedule>  cholecalciferol 2000 Unit(s) Oral daily  insulin lispro (ADMELOG) corrective regimen sliding scale   SubCutaneous every 6 hours  metoprolol tartrate 50 milliGRAM(s) Oral two times a day  mexiletine 150 milliGRAM(s) Oral every 8 hours  multivitamin 1 Tablet(s) Oral daily  pantoprazole  Injectable 40 milliGRAM(s) IV Push daily  senna 2 Tablet(s) Oral at bedtime  sertraline 100 milliGRAM(s) Oral daily  thiamine 100 milliGRAM(s) Oral daily  traMADol 25 milliGRAM(s) Oral once      Objective:  Vital Signs Last 24 Hrs  T(C): 37.7 (17 Mar 2022 18:10), Max: 38.2 (17 Mar 2022 16:25)  T(F): 99.8 (17 Mar 2022 18:10), Max: 100.8 (17 Mar 2022 16:25)  HR: 138 (17 Mar 2022 18:10) (117 - 147)  BP: --  BP(mean): 82 (17 Mar 2022 18:10) (78 - 86)  RR: 20 (17 Mar 2022 18:10) (20 - 20)  SpO2: 96% (17 Mar 2022 18:10) (91% - 100%)    PHYSICAL EXAM:  Constitutional: eyes open, tachycardic and tachypneic  Eyes:ALONSO, EOMI  Ear/Nose/Throat: no oral lesions,stoma  	NG feeding tube  Respiratory: crackles bilat  Cardiovascular: S1S2 tachy  Gastrointestinal:cholecystotomy tube in place with bilious drainage  Extremities:no e/e/c  No Lymphadenopathy  IV sites not inflammed.    LABS:                        7.2    21.60 )-----------( 186      ( 17 Mar 2022 17:20 )             23.4     03-17    148<H>  |  110<H>  |  60<H>  ----------------------------<  159<H>  4.7   |  26  |  1.06    Ca    10.3      17 Mar 2022 06:39  Mg     2.1     03-17    TPro  9.9<H>  /  Alb  3.6  /  TBili  0.5  /  DBili  x   /  AST  65<H>  /  ALT  40  /  AlkPhos  242<H>  03-17          MICROBIOLOGY:            RECENT CULTURES:      RADIOLOGY & ADDITIONAL STUDIES:    < from: Xray Chest 1 View- PORTABLE-Urgent (Xray Chest 1 View- PORTABLE-Urgent .) (03.16.22 @ 23:33) >    INTERPRETATION:  NGT tip in the stomach.      < end of copied text >

## 2022-03-17 NOTE — PROGRESS NOTE ADULT - ATTENDING COMMENTS
65 y/o M with HM3 LVAD, well known to our service. Admitted with sepsis and AMS. Found to have recurrent bacteremia, now on appropriate antibiotics. Remains non-verbal, intermittently opening eyes and squeezing hands, but not consistent.   Worsening tachycardia,  unclear etiology. Repeat blood and urine cultures, and pan CT today.   Continue treatment of bacteremia. Source of bacteremia is LVAD. Appreciate ID involvement.  Ongoing discussions with Palliative Care and family with respect to goals of care. Family meeting today--family wants to pursue PEG placement. Will discuss with GI.      Prognosis guarded.

## 2022-03-17 NOTE — GOALS OF CARE CONVERSATION - ADVANCED CARE PLANNING - CONVERSATION DETAILS
Dr. Martinez updated the patient's family about the patient's current state (chronically ill, fluctuating mental status, mostly non verbal and not able to f/u any commands. Still with dysphagia and on tube feeds) and risks for future complications (mainly related to new infections and GI bleed). It was also indicated that based on neuro inputs ("Expect poor functional recovery from neurological standpoint") and based on the patient's lack of clinical improvement that is likely that his current state is representing his new functional and probably cognitive baseline. Moving forward, if able to survive this admission, he will be facing frequent readmissions due to recurrent infections and other complication related to his frail state and advanced illnesses.     We then discussed about nutritional goals. We asked his family if they were able to indicate if, understanding his limited chances for functional recovery and high chances for facing recurrent readmissions, the patient may have wanted or not to try to pursue a PEG. His family indicated they never had a d/w the patient about this topic. Hence, that they were leaving it on God's hands. They also indicated they were not going to take any active decisions to end the patient's life. His sister also stated she was still hopeful the patient was going to be able to improve. After that, his family indicated, if possible, they wanted the patient to get a PEG tube so he was able to continue artificial nutrition.     GI was informed about this family meeting.     Will now need to see if the patient reamains stable to be able to get a PEG placed.         Francisco Burgos MD   Geriatrics and Palliative Care ([facility]) Consult Service    of Geriatric and Palliative Medicine  [facility]      Please page the following number for clinical matters between the hours of 9 am and 5 pm from Monday through Friday : [phone]    After 5pm and on weekends, please see the contact information below:    In the event of newly developing, evolving, or worsening symptoms, please contact the Palliative Medicine team via pager (if the patient is at [facility] #[phone] or if the patient is at [facility] #[phone]) The Geriatric and Palliative Medicine service has coverage 24 hours a day/ 7 days a week to provide medical recommendations regarding symptom management needs via telephone

## 2022-03-17 NOTE — PROGRESS NOTE ADULT - PROBLEM SELECTOR PLAN 1
suspect secondary to infection with diffuse cerebral dysfunction.   CT head x 2 negative for acute infarct  unable to do MRI d/t LVAD  EEG negative for seizure activity  updated neuro recs appreciated  home gabapentin discontinued given lethargy  GOC discussion re: prognosis and feeding tube ongoing - await PEG by GI.   family meeting 3/17 done - continue rx  DNR DNI

## 2022-03-17 NOTE — PROGRESS NOTE ADULT - PROBLEM SELECTOR PLAN 4
EF 10%  s/p LVAD without s/s of pump malfunction  holding BB and spironolactone given low bp  recurrent NSVT/VT when he first arrived started on lidocaine infusion with improvement now back on mexiletine  HF team following  GOC discussion done  keep K>4, Mg>2

## 2022-03-17 NOTE — PROGRESS NOTE ADULT - ASSESSMENT
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 s/p HM2 LVAD in 9/2017 at  (due to severe PAD) with TV ring, multiple GI bleeds, thus maintained off AC, prior COVID-19 infection in 4/2020, recurrent syncope post LVAD s/p ILR, prolonged complex hospitalization from 6/14-11/16/2021 with GIB, respiratory failure s/p trach decanulated 12/2021, multiple infections, s/p cholecystotomy tube and SMA stent 10/2021, recurrent COVID 19 reinfection s/p MAB/remdesivir/steroids and distributive shock with serratia bacteremia, recurrent VT on mexiletine transferred from [facility] ED with metabolic encephalopathy, hypercarbic respiratory failure weaned off bipap, septic shock requiring pressors (now on midodrine) with serratia bacteremia on cefepime, intermittent h/h drop s/p total 3units prbc (last 3/6), episodes of recurrent VT s/p lidocaine gtt now back on mexiletine.

## 2022-03-17 NOTE — PROGRESS NOTE ADULT - SUBJECTIVE AND OBJECTIVE BOX
Chief Complaint:  Patient is a 66y old  Male who presents with a chief complaint of Septic shock (17 Mar 2022 12:54)      Interval Events:   Completed GOC discussion; per primary, family would like to proceed with PEG placement  ID and cards reporting no absolute contraindication from their standpoint for PEG placement    Hospital Medications:  ascorbic acid 500 milliGRAM(s) Oral daily  cefepime   IVPB 1000 milliGRAM(s) IV Intermittent every 12 hours  chlorhexidine 2% Cloths 1 Application(s) Topical <User Schedule>  cholecalciferol 2000 Unit(s) Oral daily  insulin lispro (ADMELOG) corrective regimen sliding scale   SubCutaneous every 6 hours  metoprolol tartrate 50 milliGRAM(s) Oral two times a day  mexiletine 150 milliGRAM(s) Oral every 8 hours  multivitamin 1 Tablet(s) Oral daily  pantoprazole  Injectable 40 milliGRAM(s) IV Push daily  senna 2 Tablet(s) Oral at bedtime  sertraline 100 milliGRAM(s) Oral daily  thiamine 100 milliGRAM(s) Oral daily  traMADol 25 milliGRAM(s) Oral once      ROS:   Unable to obtain due to patient's condition.    PHYSICAL EXAM:   Vital Signs:  Vital Signs Last 24 Hrs  T(C): 36.8 (17 Mar 2022 14:03), Max: 37.8 (17 Mar 2022 08:50)  T(F): 98.3 (17 Mar 2022 14:03), Max: 100 (17 Mar 2022 08:50)  HR: 145 (17 Mar 2022 14:03) (72 - 145)  BP: 94/67 (16 Mar 2022 16:39) (94/67 - 94/67)  BP(mean): 80 (17 Mar 2022 14:03) (76 - 86)  RR: 20 (17 Mar 2022 14:03) (16 - 20)  SpO2: 91% (17 Mar 2022 14:03) (91% - 100%)  Daily     Daily Weight in k.4 (17 Mar 2022 07:52)    GENERAL: ill appearing  NEURO: non responsive, intermittently opens eyes  HEENT: NGT in place  CHEST: no respiratory distress, no accessory muscle use  CARDIAC: tachycardic  ABDOMEN: soft, non-tender, non-distended, no rebound or guarding  EXTREMITIES: cool to touch, no edema  SKIN: dry      LABS: reviewed                        7.9    13.74 )-----------( 191      ( 17 Mar 2022 06:44 )             25.4         148<H>  |  110<H>  |  60<H>  ----------------------------<  159<H>  4.7   |  26  |  1.06    Ca    10.3      17 Mar 2022 06:39  Mg     2.1         TPro  9.9<H>  /  Alb  3.6  /  TBili  0.5  /  DBili  x   /  AST  65<H>  /  ALT  40  /  AlkPhos  242<H>      LIVER FUNCTIONS - ( 17 Mar 2022 06:39 )  Alb: 3.6 g/dL / Pro: 9.9 g/dL / ALK PHOS: 242 U/L / ALT: 40 U/L / AST: 65 U/L / GGT: x             Interval Diagnostic Studies: see sunrise for full report

## 2022-03-17 NOTE — PROGRESS NOTE ADULT - PROBLEM SELECTOR PLAN 5
intermittent h/h drop s/p total 4 units prbc (last 3/16)  no s/s of active GI bleed  monitor h/h  transfuse if hb<7 or symptomatic.  trend CBC daily  hb improved after PRBC

## 2022-03-17 NOTE — PROGRESS NOTE ADULT - SUBJECTIVE AND OBJECTIVE BOX
Patient is a 66y old  Male who presents with a chief complaint of Septic shock (17 Mar 2022 07:29)      INTERVAL History of Present Illness/OVERNIGHT EVENTS: family meeting today over the phone - plan and goals of care d/w son Kang and HCP Cristina.  next step is PEG placement.  regardless of physical or mental status, they wish to continue with medical rx including tube feeding and PEG.  do not plan to "pull the plug".    MEDICATIONS  (STANDING):  ascorbic acid 500 milliGRAM(s) Oral daily  cefepime   IVPB 1000 milliGRAM(s) IV Intermittent every 12 hours  chlorhexidine 2% Cloths 1 Application(s) Topical <User Schedule>  cholecalciferol 2000 Unit(s) Oral daily  insulin lispro (ADMELOG) corrective regimen sliding scale   SubCutaneous every 6 hours  metoprolol tartrate 50 milliGRAM(s) Oral two times a day  mexiletine 150 milliGRAM(s) Oral every 8 hours  multivitamin 1 Tablet(s) Oral daily  pantoprazole  Injectable 40 milliGRAM(s) IV Push daily  senna 2 Tablet(s) Oral at bedtime  sertraline 100 milliGRAM(s) Oral daily  thiamine 100 milliGRAM(s) Oral daily  traMADol 25 milliGRAM(s) Oral once    MEDICATIONS  (PRN):      Allergies    Amiodarone Hydrochloride (Other (Severe))    Intolerances        REVIEW OF SYSTEMS:  Negative unless otherwise specified above.    Vital Signs Last 24 Hrs  T(C): 37.4 (17 Mar 2022 10:15), Max: 37.8 (17 Mar 2022 08:50)  T(F): 99.4 (17 Mar 2022 10:15), Max: 100 (17 Mar 2022 08:50)  HR: 117 (17 Mar 2022 08:50) (72 - 139)  BP: 94/67 (16 Mar 2022 16:39) (94/63 - 104/77)  BP(mean): 80 (17 Mar 2022 08:50) (76 - 86)  RR: 20 (17 Mar 2022 08:50) (16 - 20)  SpO2: 100% (17 Mar 2022 08:50) (100% - 100%)        PHYSICAL EXAM:  GENERAL: No apparent distress, appears chronically ill  HEAD:  Atraumatic, Normocephalic  EYES: Conjunctiva and sclera clear, no discharge  ENMT: NGT, no discharge  NECK: Supple, +tracheostomy   CHEST/LUNG: Air entry bilaterally  HEART: +VAD hum  ABDOMEN: Soft, Nontender, +PC drain, RLQ driveline  EXTREMITIES:  No clubbing, cyanosis or edema  NERVOUS SYSTEM: Opens eyes, does not follow commands     LABS:                        7.9    13.74 )-----------( 191      ( 17 Mar 2022 06:44 )             25.4     17 Mar 2022 06:39    148    |  110    |  60     ----------------------------<  159    4.7     |  26     |  1.06     Ca    10.3       17 Mar 2022 06:39  Mg     2.1       17 Mar 2022 06:39    TPro  9.9    /  Alb  3.6    /  TBili  0.5    /  DBili  x      /  AST  65     /  ALT  40     /  AlkPhos  242    17 Mar 2022 06:39        CAPILLARY BLOOD GLUCOSE      POCT Blood Glucose.: 227 mg/dL (17 Mar 2022 12:29)  POCT Blood Glucose.: 213 mg/dL (17 Mar 2022 05:40)  POCT Blood Glucose.: 139 mg/dL (16 Mar 2022 23:59)  POCT Blood Glucose.: 139 mg/dL (16 Mar 2022 21:39)  POCT Blood Glucose.: 138 mg/dL (16 Mar 2022 18:07)      RADIOLOGY & ADDITIONAL TESTS:      Images reviewed personally    Consultant Notes Reviewed and Care Discussed with relevant Consultants.

## 2022-03-17 NOTE — PROGRESS NOTE ADULT - ASSESSMENT
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    S/p prolonged complex hospitalization from 6/14-11/16/2021 initially admitted with abdominal pain complicated by GIB, respiratory failure s/p trach, multiple infections, s/p cholecystotomy tube and SMA stent 10/2021, ultimately discharged to [facility] rehab and then returned to [facility] for trach decannulation 12/2. The patient had a recent admission with COVID 19 reinfection and distributive shock. That admission he had blood culture positive for serratia marcescens (discharged on levaquin).  He was treated with MAB, remdesivir, and steroids. He had recurrent VT and was started mexiletine.     Now coming in from [facility] ED with leukocytosis and AMS was treated for UTI. Initially required bipap but was weaned off here. Labs concerning here for WBC 26, hemoglobin of 7s then 6.7, creatinine of 1.3. His maps were initially in the 50s. Physical exam showing pus from stoma, sat of central access of 81.8, and tachycardia are all concerning for septic shock picture. He is s/p 1L fluids, 1uPRBCs, was placed on vaso. Patient has known episodes of VT and had recurrent NSVT/VT when he first arrived started on lidocaine infusion with improvement. Of note he was previously on mexiletine 150 TID outpatient, metoprolol ER 25 qAM and 50 qPM. Previous history of thyrotoxicosis on amiodarone.    He was found to be bacteremic with recurrent serratia, on IV abx possibly r/t LVAD infection vs splenic abscess noted on CT 1/19. Afebrile since 3/1. Vasopressin weaned off 3/4, started on midodrine which has now been off since 3/8. LVAD without s/s of pump malfunction. Currently he opens his eyes however remains nonverbal and not following commands. His mental status is still not back to baseline. His VT has now subsided, last episode was noted on 3/12.  His hyponatremia and hyperkalemia are overall improving after receiving the following treatment insulin/D50 and lokelma.  Recently his hemoglobin has continued to drop, will be transfused with 1 uPRBC on 3/16. Overall fairly stable from HF perspective. 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 After family discussion on 3/17, family wishes to continues course of treatment and are wanting to proceed with PEG placement, will need to discuss date with GI.

## 2022-03-18 NOTE — PROGRESS NOTE ADULT - SUBJECTIVE AND OBJECTIVE BOX
Patient is a 66y old  Male who presents with a chief complaint of Septic shock (18 Mar 2022 11:25)      INTERVAL History of Present Illness/OVERNIGHT EVENTS: RRT 3/18 for tachycardia.  new septic shock due to RLL pneumonia  possible aspiration pneumonia or mucous plugging  tube feeds on hold  son was at bedside 3/17  improved HR now  antibiotics broadened  PEG cancelled    MEDICATIONS  (STANDING):  ascorbic acid 500 milliGRAM(s) Oral daily  chlorhexidine 2% Cloths 1 Application(s) Topical <User Schedule>  cholecalciferol 2000 Unit(s) Oral daily  dextrose 5%. 1000 milliLiter(s) (50 mL/Hr) IV Continuous <Continuous>  insulin lispro (ADMELOG) corrective regimen sliding scale   SubCutaneous every 6 hours  meropenem  IVPB 1000 milliGRAM(s) IV Intermittent every 12 hours  metoprolol tartrate 50 milliGRAM(s) Oral two times a day  mexiletine 150 milliGRAM(s) Oral every 8 hours  multivitamin 1 Tablet(s) Oral daily  pantoprazole  Injectable 40 milliGRAM(s) IV Push two times a day  senna 2 Tablet(s) Oral at bedtime  sertraline 100 milliGRAM(s) Oral daily  thiamine 100 milliGRAM(s) Oral daily  traMADol 25 milliGRAM(s) Oral once  vancomycin  IVPB 500 milliGRAM(s) IV Intermittent every 12 hours    MEDICATIONS  (PRN):      Allergies    Amiodarone Hydrochloride (Other (Severe))    Intolerances        REVIEW OF SYSTEMS:  Negative unless otherwise specified above.    Vital Signs Last 24 Hrs  T(C): 37.2 (18 Mar 2022 08:00), Max: 38.2 (17 Mar 2022 16:25)  T(F): 99 (18 Mar 2022 08:00), Max: 100.8 (17 Mar 2022 16:25)  HR: 103 (18 Mar 2022 08:00) (103 - 147)  BP: --  BP(mean): 72 (18 Mar 2022 08:00) (72 - 86)  RR: 22 (18 Mar 2022 08:00) (19 - 22)  SpO2: 99% (18 Mar 2022 08:00) (91% - 99%)        PHYSICAL EXAM:  GENERAL: No apparent distress, appears chronically ill  HEAD:  Atraumatic, Normocephalic  EYES: Conjunctiva and sclera clear, no discharge  ENMT: NGT, no discharge  NECK: Supple, +trach stoma  CHEST/LUNG: Air entry bilaterally, limited exam  HEART: +VAD hum  ABDOMEN: Soft, Nontender, +PC drain, RLQ driveline  EXTREMITIES:  No clubbing, cyanosis or edema  NERVOUS SYSTEM: Opens eyes, does not follow commands       LABS:                        7.0    23.79 )-----------( 165      ( 18 Mar 2022 06:30 )             22.9     18 Mar 2022 06:24    150    |  112    |  72     ----------------------------<  121    4.3     |  25     |  1.20     Ca    10.2       18 Mar 2022 06:24    TPro  9.9    /  Alb  3.4    /  TBili  0.6    /  DBili  x      /  AST  210    /  ALT  149    /  AlkPhos  318    18 Mar 2022 06:24        CAPILLARY BLOOD GLUCOSE      POCT Blood Glucose.: 137 mg/dL (18 Mar 2022 12:41)  POCT Blood Glucose.: 140 mg/dL (18 Mar 2022 05:54)  POCT Blood Glucose.: 142 mg/dL (18 Mar 2022 03:35)  POCT Blood Glucose.: 139 mg/dL (17 Mar 2022 21:41)  POCT Blood Glucose.: 214 mg/dL (17 Mar 2022 16:53)      RADIOLOGY & ADDITIONAL TESTS:      Images reviewed personally    Consultant Notes Reviewed and Care Discussed with relevant Consultants.

## 2022-03-18 NOTE — PROGRESS NOTE ADULT - SUBJECTIVE AND OBJECTIVE BOX
Patient is a 66y old  Male who presents with a chief complaint of Septic shock (18 Mar 2022 11:25)      Subjective:  Patient seen and examined at bedside.    Review Of Systems: lethargic, tachypneic, unresponsive         Medications:  ascorbic acid 500 milliGRAM(s) Oral daily  chlorhexidine 2% Cloths 1 Application(s) Topical <User Schedule>  cholecalciferol 2000 Unit(s) Oral daily  dextrose 5%. 1000 milliLiter(s) IV Continuous <Continuous>  insulin lispro (ADMELOG) corrective regimen sliding scale   SubCutaneous every 6 hours  meropenem  IVPB 1000 milliGRAM(s) IV Intermittent every 12 hours  metoprolol tartrate 50 milliGRAM(s) Oral two times a day  mexiletine 150 milliGRAM(s) Oral every 8 hours  multivitamin 1 Tablet(s) Oral daily  pantoprazole  Injectable 40 milliGRAM(s) IV Push two times a day  senna 2 Tablet(s) Oral at bedtime  sertraline 100 milliGRAM(s) Oral daily  thiamine 100 milliGRAM(s) Oral daily  traMADol 25 milliGRAM(s) Oral once  vancomycin  IVPB 500 milliGRAM(s) IV Intermittent every 12 hours      PAST MEDICAL & SURGICAL HISTORY:  CHF (congestive heart failure)    CAD (coronary artery disease)    Depression    Pleural effusion    History of 2019 novel coronavirus disease (COVID-19)  april 2020    Hemorrhoids    Bleeding hemorrhoids    Peripheral arterial disease    Claudication    BPH with urinary obstruction    ACC/AHA stage D systolic heart failure    Anticoagulation goal of INR 2.0 to 2.5    Falls    Clavicle fracture    CKD (chronic kidney disease), stage III    Iron deficiency anemia    H/O epistaxis    Vertigo    GI bleed    S/P TVR (tricuspid valve repair)    S/P ventricular assist device    S/P endoscopy    H/O tracheostomy        Objective:  Vitals:  T(F): 99 (03-18), Max: 100.8 (03-17)  HR: 103 (03-18) (103 - 147)  BP: --  RR: 22 (03-18)  SpO2: 99% (03-18)  I&O's Summary    17 Mar 2022 07:01  -  18 Mar 2022 07:00  --------------------------------------------------------  IN: 0 mL / OUT: 925 mL / NET: -925 mL    18 Mar 2022 07:01  -  18 Mar 2022 12:59  --------------------------------------------------------  IN: 0 mL / OUT: 50 mL / NET: -50 mL        Physical Exam:  Appearance: tachypneic   Cardiovascular: tachycardia, LVAD hum; no edema; no JVD  Respiratory: bilateral rhonchi  Gastrointestinal: soft, non-tender, non-distended with normal bowel sounds  Musculoskeletal: No clubbing; no joint deformity                           7.0    23.79 )-----------( 165      ( 18 Mar 2022 06:30 )             22.9     03-18    150<H>  |  112<H>  |  72<H>  ----------------------------<  121<H>  4.3   |  25  |  1.20    Ca    10.2      18 Mar 2022 06:24  Mg     2.1     03-17    TPro  9.9<H>  /  Alb  3.4  /  TBili  0.6  /  DBili  x   /  AST  210<H>  /  ALT  149<H>  /  AlkPhos  318<H>  03-18      CARDIAC MARKERS ( 17 Mar 2022 17:20 )  x     / x     / 46 U/L / x     / 1.5 ng/mL        Interpretation of Telemetry: -130 bpm

## 2022-03-18 NOTE — PROGRESS NOTE ADULT - ASSESSMENT
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    S/p prolonged complex hospitalization from 6/14-11/16/2021 initially admitted with abdominal pain complicated by GIB, respiratory failure s/p trach, multiple infections, s/p cholecystotomy tube and SMA stent 10/2021, ultimately discharged to [facility] rehab and then returned to [facility] for trach decannulation 12/2. The patient had a recent admission with COVID 19 reinfection and distributive shock. That admission he had blood culture positive for serratia marcescens (discharged on levaquin).  He was treated with MAB, remdesivir, and steroids. He had recurrent VT and was started mexiletine.     Now coming in from [facility] ED with leukocytosis and AMS was treated for UTI. Initially required bipap but was weaned off here. Labs concerning here for WBC 26, hemoglobin of 7s then 6.7, creatinine of 1.3. His maps were initially in the 50s. Physical exam showing pus from stoma, sat of central access of 81.8, and tachycardia are all concerning for septic shock picture. He is s/p 1L fluids, 1uPRBCs, was placed on vaso. Patient has known episodes of VT and had recurrent NSVT/VT when he first arrived started on lidocaine infusion with improvement. Of note he was previously on mexiletine 150 TID outpatient, metoprolol ER 25 qAM and 50 qPM. Previous history of thyrotoxicosis on amiodarone.    He was found to be bacteremic with recurrent serratia, on IV abx possibly r/t LVAD infection vs splenic abscess noted on CT 1/19. Afebrile since 3/1. Vasopressin weaned off 3/4, started on midodrine which has now been off since 3/8. LVAD without s/s of pump malfunction. Currently he opens his eyes however remains nonverbal and not following commands. His mental status is still not back to baseline. His VT has now subsided, last episode was noted on 3/12.  His hyponatremia and hyperkalemia are overall improving after receiving the following treatment insulin/D50 and lokelma.  Recently his hemoglobin has continued to drop, will be transfused with 1 uPRBC on 3/16. Overall fairly stable from HF perspective. 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 Currently patient is not a candidate for PEG placement. Family meeting has been arranged for tomorrow to discuss GOC and next steps.

## 2022-03-18 NOTE — PROGRESS NOTE ADULT - PROBLEM SELECTOR PLAN 1
suspect secondary to infection with diffuse cerebral dysfunction.   CT head x 2 negative for acute infarct  unable to do MRI d/t LVAD  EEG negative for seizure activity  updated neuro recs appreciated  home gabapentin discontinued given lethargy  GOC discussion re: prognosis and feeding tube ongoing - await PEG by GI when medically improved   family meeting 3/17 done - continue rx and obtain PEG when able  they are aware of poor prognosis and functional quadriplegia  DNR DNI

## 2022-03-18 NOTE — PROGRESS NOTE ADULT - PROBLEM SELECTOR PLAN 3
- on Lopressor 50 mg PO BID  - holding carlos for now. Remains on Lokelma 10mg daily.  - maintain I=O

## 2022-03-18 NOTE — PROGRESS NOTE ADULT - PROBLEM SELECTOR PLAN 5
intermittent h/h drop s/p total 4 units prbc (last 3/16)  no s/s of active GI bleed  monitor h/h  transfuse if hb<7 or symptomatic.  trend CBC daily  no overt evidence of hemolysis or bleeding  -likely component of bone marrow suppression from acute illness

## 2022-03-18 NOTE — PROGRESS NOTE ADULT - SUBJECTIVE AND OBJECTIVE BOX
RICKY JOINT  MRN#: [id number]  Subjective: pulmonary progress note  : acute hypercapnic respiratory failure . 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 for trach decannulation , readmitted in 2022 with recurrent COVID-19 infection, initially in distributive shock. Blood cultures were also positive for serratia marcescens which he has had in the past. seen by heart failure team patient is DNR/DNI  continue on 3 liter nasal canula flat Affect , hyperkalemia lokelma , runs of V-Tach. more stable  very weak , non verbal non communicative on tube feeding , leg booting, CT scan of chest mucous bulging  on Antibiotic and incentive spirometry            (2022 22:20)    PAST MEDICAL & SURGICAL HISTORY:  CHF (congestive heart failure)    CAD (coronary artery disease)  Depression    Pleural effusion    History of 2019 novel coronavirus disease (COVID-19)  2020    Hemorrhoids    Bleeding hemorrhoids    Peripheral arterial disease    Claudication    BPH with urinary obstruction    ACC/AHA stage D systolic heart failure    Anticoagulation goal of INR 2.0 to 2.5    Falls    Clavicle fracture    CKD (chronic kidney disease), stage III    Iron deficiency anemia    H/O epistaxis    Vertigo    GI bleed    S/P TVR (tricuspid valve repair)    S/P ventricular assist device    S/P endoscopy    H/O tracheostomy        FAMILY HISTORY:    Social History:    Marital Status:  X    (   ) Single    (   )    (  )   Occupation: Retired   Lives with: (  ) alone  (  ) children  X spouse   (  ) parents  (  ) other    Substance Use (street drugs): X never used  (  ) other:  Tobacco Usage: X never smoked   (   ) former smoker   (   ) current smoker  (     ) pack year  (    ) last cigarette date  Alcohol Usage: Social         OBJECTIVE:  ICU Vital Signs Last 24 Hrs  T(C): 37.2 (01 Mar 2022 16:00), Max: 38.4 (01 Mar 2022 09:00)  T(F): 99 (01 Mar 2022 16:00), Max: 101.1 (01 Mar 2022 09:00)  HR: 109 (01 Mar 2022 18:00) (67 - 124)  BP: --  BP(mean): 86 (2022 20:00) (86 - 86)  ABP: 89/74 (01 Mar 2022 18:00) (75/63 - 160/85)  ABP(mean): 81 (01 Mar 2022 18:00) (68 - 139)  RR: 38 (01 Mar 2022 18:00) (5 - 40)  SpO2: 99% (01 Mar 2022 18:00) (94% - 100%)       @ 07: @ 07:00  --------------------------------------------------------  IN: 1060.7 mL / OUT: 1600 mL / NET: -539.3 mL     @ 07: @ 18:22  --------------------------------------------------------  IN: 677.8 mL / OUT: 455 mL / NET: 222.8 mL    PHYSICAL EXAM :Daily Height in cm: 177.8 (2022 21:20)  Elderly frail male on 3 liter nasal cannula    Daily Weight in k.7 (2022 21:20)  HEENT:     + NCAT  + EOMI  - Conjuctival edema   - Icterus   - Thrush   - ETT  - NGT/OGT  Neck:         + FROM RT IJ  JVD     - Nodes     - Masses    + Mid-line trachea   - Tracheostomy  Chest:           mild kyphosis   Lungs:          + CTA  + Rhonchi  + Rales    - Wheezing  + Decreased BS   - Dullness R L  Cardiac:       + S1 + S2    + RRR   - Irregular   - S3  - S4    - Murmurs   - Rub   - Hamman’s sign   Abdomen:    + BS     + Soft    + Non-tender  - Distended  - Organomegaly  - PEG Cholecystostomy tube in place   Extremities:   - Cyanosis U/L   - Clubbing  U/L  - LE/UE Edema   + Capillary refill    + Pulses   Neuro:        + Awake   +  Alert   - Confused   - Lethargic   - Sedated   - Generalized Weakness  Skin:        - Rashes    - Erythema   + Normal incisions   + IV sites intact  -      HOSPITAL MEDICATIONS: All mediciations reviewed and analyzed  MEDICATIONS  (STANDING):  albumin human  5% IVPB 250 milliLiter(s) IV Intermittent once  cefepime   IVPB 2000 milliGRAM(s) IV Intermittent every 12 hours  chlorhexidine 2% Cloths 1 Application(s) Topical <User Schedule>  cholecalciferol 1000 Unit(s) Oral daily  dextrose 40% Gel 15 Gram(s) Oral once  dextrose 50% Injectable 25 Gram(s) IV Push once  gabapentin Solution 100 milliGRAM(s) Oral three times a day  glucagon  Injectable 1 milliGRAM(s) IntraMuscular once  insulin lispro (ADMELOG) corrective regimen sliding scale   SubCutaneous every 6 hours  magnesium sulfate  IVPB 1 Gram(s) IV Intermittent once  methimazole 10 milliGRAM(s) Oral daily  metoclopramide 10 milliGRAM(s) Oral four times a day  mexiletine 150 milliGRAM(s) Oral every 8 hours  mirtazapine 7.5 milliGRAM(s) Oral at bedtime  multivitamin 1 Tablet(s) Oral daily  pantoprazole  Injectable 40 milliGRAM(s) IV Push daily  potassium phosphate IVPB 15 milliMole(s) IV Intermittent once  senna 2 Tablet(s) Oral at bedtime  sertraline 100 milliGRAM(s) Oral daily  vancomycin  IVPB 750 milliGRAM(s) IV Intermittent every 24 hours  vasopressin Infusion 0.02 Unit(s)/Min (1.2 mL/Hr) IV Continuous <Continuous>    MEDICATIONS  (PRN):    LABS: All Lab data reviewed and analyzed                        6 )-----------( 203      ( 16 Mar 2022 09:27 )             22.6   03-16    142  |  106  |  58<H>  ----------------------------<  168<H>  4.2   |  26  |  0.96    Ca    9.9      16 Mar 2022 06:37  Phos  2.7     03-15  Mg     1.9     -16    TPro  9.4<H>  /  Alb  3.2<L>  /  TBili  0.4  /  DBili  x   /  AST  35  /  ALT  24  /  AlkPhos  207<H>  03-    Ca    9.8      12 Mar 2022 06:34  Mg     2.1     -      Ca    9.8      12 Mar 2022 06:34  Phos  3.9     03-11  Mg     2.1     03-12    TPro  9.8<H>  /  Alb  3.5  /  TBili  0.4  /  DBili  x   /  AST  36  /  ALT  20  /  AlkPhos  170<H>  03-11             PTT - ( 01 Mar 2022 00:08 )  PTT:25.0 sec LIVER FUNCTIONS - ( 01 Mar 2022 17:38 )  Alb: 2.9 g/dL / Pro: 7.3 g/dL / ALK PHOS: 116 U/L / ALT: 30 U/L / AST: 29 U/L / GGT: x           RADIOLOGY: - Reviewed and analyzed

## 2022-03-18 NOTE — CHART NOTE - NSCHARTNOTEFT_GEN_A_CORE
Brief Update Note    RRT for Mr. Quispe in setting of HR 150s.  Febrile overnight.    EGD/PEG canceled as patient is febrile again and very tachycardic.  Infectious work up and source control  Cardiac optimization; rate control  NGT for TFs in the meantime  Consider revisiting GOC with family    Please reach out to GI for re-evaluation for PEG placement when patient is more stable and medically optimized.    Aleksandra Kelyl MD  Gastroenterology/Hepatology Fellow, PGY-V    NON-URGENT CONSULTS:  Please email [email or web address]  [email or web address]  AT NIGHT AND ON WEEKENDS:  Contact on-call GI fellow via answering service ([phone]) from 5pm-8am and on weekends/holidays  MONDAY-FRIDAY 8AM-5PM:  Pager# [phone] ([facility])  GI Phone# [phone] ([facility]) Brief Update Note    RRT for Mr. Quispe in setting of HR 150s.  Febrile 100.8    EGD/PEG canceled as patient is febrile again and very tachycardic.  Infectious work up and source control  Cardiac optimization; rate control  NGT for TFs in the meantime  Consider revisiting GOC with family    Please reach out to GI for re-evaluation for PEG placement when patient is more stable and medically optimized.    Aleksandra Kelly MD  Gastroenterology/Hepatology Fellow, PGY-V    NON-URGENT CONSULTS:  Please email [email or web address]  AT NIGHT AND ON WEEKENDS:  Contact on-call GI fellow via answering service ([phone]) from 5pm-8am and on weekends/holidays  MONDAY-FRIDAY 8AM-5PM:  Pager# [phone] ([facility])  GI Phone# [phone] ([facility]) Brief Update Note    RRT for Mr. Quispe in setting of HR 150s.  Febrile 100.8    PEG canceled as patient is febrile again, worsening leukocytosis and very tachycardic.  Infectious work up and source control  Cardiac optimization; rate control  NGT for TFs in the meantime  Consider revisiting GOC with family    Regarding chronic anemia, no overt bleeding noted.  Tentatively plan for EGD evaluation with PEG placement when medically optimized as mentioned above  PPI 40 IV BID  Trend hgb daily, transfuse if Hgb < 7  Maintain PLT > 50  If he develops overt bleeding/becomes HD unstable with drop in Hgb, obtain stat CTA and consult IR; GI will also re-assess.    Please reach out to GI for re-evaluation for EGD/PEG placement when patient is medically optimized.      Aleksandra Kelly MD  Gastroenterology/Hepatology Fellow, PGY-V    NON-URGENT CONSULTS:  Please email [email or web address]  [email or web address]  AT NIGHT AND ON WEEKENDS:  Contact on-call GI fellow via answering service ([phone]) from 5pm-8am and on weekends/holidays  MONDAY-FRIDAY 8AM-5PM:  Pager# [phone] ([facility])  GI Phone# [phone] ([facility]) Brief Update Note    RRT for Mr. Quispe in setting of HR 150s.  Febrile 100.8    PEG canceled as patient is febrile again, worsening leukocytosis and very tachycardic.  Infectious work up and source control  Cardiac optimization; rate control  NGT for TFs in the meantime  Consider revisiting GOC with family    Regarding chronic anemia, no overt bleeding noted.  Tentatively plan for EGD evaluation with PEG placement when medically optimized as mentioned above  PPI 40 IV BID  Trend hgb daily, transfuse if Hgb < 7  Maintain PLT > 50  If he develops overt bleeding/becomes HD unstable with significant drop in Hgb, obtain stat CTA and consult IR; GI will also re-assess.    Please reach out to GI for re-evaluation for EGD/PEG placement when patient is medically optimized.      Aleksandra Kelly MD  Gastroenterology/Hepatology Fellow, PGY-V    NON-URGENT CONSULTS:  Please email [email or web address]  [email or web address]  AT NIGHT AND ON WEEKENDS:  Contact on-call GI fellow via answering service ([phone]) from 5pm-8am and on weekends/holidays  MONDAY-FRIDAY 8AM-5PM:  Pager# [phone] ([facility])  GI Phone# [phone] ([facility]) Brief Update Note    RRT for Mr. Quispe in setting of HR 150s.  Febrile 100.8    PEG canceled as patient is febrile again, worsening leukocytosis and very tachycardic.  Infectious work up and source control  Cardiac optimization; rate control  NGT for TFs in the meantime  Consider revisiting GOC with family    Regarding anemia, no overt bleeding noted.   Tentatively plan for EGD evaluation with PEG placement when medically optimized as mentioned above  PPI 40 IV BID  Trend hgb daily, transfuse if Hgb < 7  Maintain PLT > 50  If he develops overt bleeding/becomes HD unstable with significant drop in Hgb, obtain stat CTA and consult IR; GI will also re-assess.  Check HBsAg/Ab, HBcAb, HAV IgM, HCV Ab for worsening liver chemistries    Please reach out to GI for re-evaluation for EGD/PEG placement when patient is medically optimized.      Aleksandra Kelly MD  Gastroenterology/Hepatology Fellow, PGY-V    NON-URGENT CONSULTS:  Please email [email or web address]  [email or web address]  AT NIGHT AND ON WEEKENDS:  Contact on-call GI fellow via answering service ([phone]) from 5pm-8am and on weekends/holidays  MONDAY-FRIDAY 8AM-5PM:  Pager# [phone] ([facility])  GI Phone# [phone] ([facility])

## 2022-03-18 NOTE — PROGRESS NOTE ADULT - ASSESSMENT
Assessment and Recommendation:   · Assessment	  Assessment and recommendation :  Acute hypercapnic respiratory Failure on 3 liter on nasal cannula    S/P Septic shock   Bacteremia with serratia marcescens  mucous plug on antibiotic hold on PEG   Severe anemia with low H/H   S/P transfuse one unit of Packed RBCs   Severe ischemic cardiomyopathy   ACC/AHA stage D systolic heart failure  severe ischemic cardiomyopathy EF 10%  Peripheral vascular Disease   PAF on no ACO   H/O of repeated GI bleeding   S/P mesenteric ischemia   GERD on Reglan   S/P HM2 LVAD , VT on Mexiletine   hyperthyroidism on  methimazole   severe protein caloric malnutrition   severe neuropathy   Major depression   S/P tracheostomy X2  Continue Antibiotic cefepime   NG tube feeding   GI prophylaxis   patient is DNR/DNI

## 2022-03-18 NOTE — PROGRESS NOTE ADULT - ATTENDING COMMENTS
65 y/o M with HM3 LVAD, well known to our service. Admitted with sepsis and AMS. Found to have recurrent bacteremia, was on antibiotics. Over the past two days, worsening tachycardia and tachypnea, antibiotics broadened with some improvement in clinical picture. Cultures negative, CT scan unrevealing. Remains non-verbal, intermittently opening eyes and squeezing hands, but not consistent.   Ongoing discussions with Palliative Care and family with respect to goals of care. Family meeting 3/17/2022--family wants to pursue PEG placement. Will discuss with GI once patient more stable.     Prognosis guarded.

## 2022-03-18 NOTE — PROGRESS NOTE ADULT - ASSESSMENT
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 Nephrology called initially for hyponatremia which resolved. Now re-called for hypernatremia.

## 2022-03-18 NOTE — PROGRESS NOTE ADULT - PROBLEM SELECTOR PLAN 1
- 2/28 BCx + serratia/GNR, blood cx 3  - IV abx per ID. currently on vanco and meropenem  - CT C/A/P showing right lower lobe mucus plugging and lung opacities, possibly infectious.  - home mirtazapine and gabapentin discontinued given lethargy  - PEG placement canceled due to deterioration.   - will follow ID recs regarding PICC line placement

## 2022-03-18 NOTE — CHART NOTE - NSCHARTNOTEFT_GEN_A_CORE
Events noted. Please repeat TSH, Total T3 and Free T4 on 3/22 AM. Endocrine team will follow.    Kayce Bran DO

## 2022-03-18 NOTE — PHARMACOTHERAPY INTERVENTION NOTE - COMMENTS
Patient is a 66 year old male with heart failure s/p LVAD and recurrent Serratia bacteremia on cefepime, now switched to meropenem 1g every 8 hours and vancomycin 750 mg every 12 hours due to new fevers. Recommended adjusting meropenem dose to 1g every 12 hours (CrCl 42 mL/min) and vancomycin to 500 mg every 12 hours for goal AUC/CLAU of 400-600.    Yohana Manzanares, PharmD, [facility]  Clinical Pharmacy Specialist  [phone] or Teams

## 2022-03-18 NOTE — PROGRESS NOTE ADULT - SUBJECTIVE AND OBJECTIVE BOX
[facility] Division of Kidney Diseases & Hypertension  FOLLOW UP NOTE  [phone]--------------------------------------------------------------------------------  Chief Complaint:Sepsis        24 hour events/subjective: Patient seen & examined. Labs & vitals reviewed. Denies chest pain, fever, chills, increased frequency, dysuria, hematuria, pus in urine, frothy urine, SOB, leg edema, loss of appetite, N/V, pruritis. UO in the last 24 hours          PAST HISTORY  --------------------------------------------------------------------------------  No significant changes to PMH, PSH, FHx, SHx, unless otherwise noted    ALLERGIES & MEDICATIONS  --------------------------------------------------------------------------------  Allergies    Amiodarone Hydrochloride (Other (Severe))    Intolerances      Standing Inpatient Medications  ascorbic acid 500 milliGRAM(s) Oral daily  chlorhexidine 2% Cloths 1 Application(s) Topical <User Schedule>  cholecalciferol 2000 Unit(s) Oral daily  dextrose 5%. 1000 milliLiter(s) IV Continuous <Continuous>  insulin lispro (ADMELOG) corrective regimen sliding scale   SubCutaneous every 6 hours  meropenem  IVPB 1000 milliGRAM(s) IV Intermittent every 12 hours  metoprolol tartrate 50 milliGRAM(s) Oral two times a day  mexiletine 150 milliGRAM(s) Oral every 8 hours  multivitamin 1 Tablet(s) Oral daily  pantoprazole  Injectable 40 milliGRAM(s) IV Push two times a day  senna 2 Tablet(s) Oral at bedtime  sertraline 100 milliGRAM(s) Oral daily  thiamine 100 milliGRAM(s) Oral daily  traMADol 25 milliGRAM(s) Oral once  vancomycin  IVPB 500 milliGRAM(s) IV Intermittent every 12 hours    PRN Inpatient Medications      REVIEW OF SYSTEMS  --------------------------------------------------------------------------------  Gen: No  fevers/chills  Respiratory: No dyspnea, cough  CV: No chest pain  GI: No abdominal pain, diarrhea,  nausea, vomiting  : No increased frequency, dysuria, hematuria  MSK:  no edema  Neuro: No dizziness/lightheadedness      All other systems were reviewed and are negative, except as noted.    VITALS/PHYSICAL EXAM  --------------------------------------------------------------------------------  T(C): 37.2 (03-18-22 @ 08:00), Max: 38.2 (03-17-22 @ 16:25)  HR: 103 (03-18-22 @ 08:00) (103 - 147)  BP: --  RR: 22 (03-18-22 @ 08:00) (19 - 22)  SpO2: 99% (03-18-22 @ 08:00) (91% - 99%)  Wt(kg): --        03-17-22 @ 07:01  -  03-18-22 @ 07:00  --------------------------------------------------------  IN: 0 mL / OUT: 925 mL / NET: -925 mL    03-18-22 @ 07:01  -  03-18-22 @ 11:26  --------------------------------------------------------  IN: 0 mL / OUT: 50 mL / NET: -50 mL      Physical Exam:  	Gen: NAD, well-appearing  	HEENT: PERRL, supple neck  	Pulm: CTA B/L  	CV: RRR, S1S2  	Back: No spinal or CVA tenderness  	Abd: +BS, soft, nontender/nondistended  	: No suprapubic tenderness          Extremities: no bilateral LE edema, no asterixis          Neuro: Awake, alert, No focal deficits  	Skin: Warm, without rashes  	Vascular access:    LABS/STUDIES  --------------------------------------------------------------------------------              7.0    23.79 >-----------<  165      [03-18-22 @ 06:30]              22.9     150  |  112  |  72  ----------------------------<  121      [03-18-22 @ 06:24]  4.3   |  25  |  1.20        Ca     10.2     [03-18-22 @ 06:24]      Mg     2.1     [03-17-22 @ 06:39]    TPro  9.9  /  Alb  3.4  /  TBili  0.6  /  DBili  x   /  AST  210  /  ALT  149  /  AlkPhos  318  [03-18-22 @ 06:24]        CK 46      [03-17-22 @ 17:20]    Creatinine Trend:  SCr 1.20 [03-18 @ 06:24]  SCr 1.06 [03-17 @ 06:39]  SCr 0.96 [03-16 @ 06:37]  SCr 1.04 [03-15 @ 06:08]  SCr 1.01 [03-14 @ 07:09]    Urinalysis - [03-11-22 @ 15:45]      Color Light Yellow / Appearance Clear / SG 1.016 / pH 7.0      Gluc Trace / Ketone Negative  / Bili Negative / Urobili Negative       Blood Small / Protein 100 / Leuk Est Small / Nitrite Negative      RBC 33 / WBC 11 / Hyaline 10 / Gran  / Sq Epi  / Non Sq Epi 6 / Bacteria Negative    Urine Creatinine 45      [03-11-22 @ 15:46]  Urine Sodium 69      [03-11-22 @ 15:46]  Urine Potassium 54      [03-11-22 @ 15:46]  Urine Chloride 58      [03-11-22 @ 15:46]  Urine Osmolality 421      [03-11-22 @ 15:46]    TSH 13.50      [03-14-22 @ 09:18]       [facility] Division of Kidney Diseases & Hypertension  FOLLOW UP NOTE  [phone]--------------------------------------------------------------------------------  Chief Complaint:Sepsis        24 hour events/subjective: Patient seen & examined. Labs & vitals reviewed. Recalled due to hypernatremia. Appears lethargic, opens eyes, non verbal. SNa 150 today        PAST HISTORY  --------------------------------------------------------------------------------  No significant changes to PMH, PSH, FHx, SHx, unless otherwise noted    ALLERGIES & MEDICATIONS  --------------------------------------------------------------------------------  Allergies    Amiodarone Hydrochloride (Other (Severe))    Intolerances      Standing Inpatient Medications  ascorbic acid 500 milliGRAM(s) Oral daily  chlorhexidine 2% Cloths 1 Application(s) Topical <User Schedule>  cholecalciferol 2000 Unit(s) Oral daily  dextrose 5%. 1000 milliLiter(s) IV Continuous <Continuous>  insulin lispro (ADMELOG) corrective regimen sliding scale   SubCutaneous every 6 hours  meropenem  IVPB 1000 milliGRAM(s) IV Intermittent every 12 hours  metoprolol tartrate 50 milliGRAM(s) Oral two times a day  mexiletine 150 milliGRAM(s) Oral every 8 hours  multivitamin 1 Tablet(s) Oral daily  pantoprazole  Injectable 40 milliGRAM(s) IV Push two times a day  senna 2 Tablet(s) Oral at bedtime  sertraline 100 milliGRAM(s) Oral daily  thiamine 100 milliGRAM(s) Oral daily  traMADol 25 milliGRAM(s) Oral once  vancomycin  IVPB 500 milliGRAM(s) IV Intermittent every 12 hours    PRN Inpatient Medications      REVIEW OF SYSTEMS  --------------------------------------------------------------------------------  unable to obtain      VITALS/PHYSICAL EXAM  --------------------------------------------------------------------------------  T(C): 37.2 (03-18-22 @ 08:00), Max: 38.2 (03-17-22 @ 16:25)  HR: 103 (03-18-22 @ 08:00) (103 - 147)  BP: --  RR: 22 (03-18-22 @ 08:00) (19 - 22)  SpO2: 99% (03-18-22 @ 08:00) (91% - 99%)  Wt(kg): --        03-17-22 @ 07:01  -  03-18-22 @ 07:00  --------------------------------------------------------  IN: 0 mL / OUT: 925 mL / NET: -925 mL    03-18-22 @ 07:01  -  03-18-22 @ 11:26  --------------------------------------------------------  IN: 0 mL / OUT: 50 mL / NET: -50 mL      Physical Exam:  	Gen: lethargic  	HEENT: MMM  	Pulm: CTA B/L  	CV: S1S2  	Abd: Soft, +BS   	Ext: No LE edema B/L  	Neuro: lethargic  	Skin: Warm and dry  	Vascular access:    LABS/STUDIES  --------------------------------------------------------------------------------              7.0    23.79 >-----------<  165      [03-18-22 @ 06:30]              22.9     150  |  112  |  72  ----------------------------<  121      [03-18-22 @ 06:24]  4.3   |  25  |  1.20        Ca     10.2     [03-18-22 @ 06:24]      Mg     2.1     [03-17-22 @ 06:39]    TPro  9.9  /  Alb  3.4  /  TBili  0.6  /  DBili  x   /  AST  210  /  ALT  149  /  AlkPhos  318  [03-18-22 @ 06:24]        CK 46      [03-17-22 @ 17:20]    Creatinine Trend:  SCr 1.20 [03-18 @ 06:24]  SCr 1.06 [03-17 @ 06:39]  SCr 0.96 [03-16 @ 06:37]  SCr 1.04 [03-15 @ 06:08]  SCr 1.01 [03-14 @ 07:09]    Urinalysis - [03-11-22 @ 15:45]      Color Light Yellow / Appearance Clear / SG 1.016 / pH 7.0      Gluc Trace / Ketone Negative  / Bili Negative / Urobili Negative       Blood Small / Protein 100 / Leuk Est Small / Nitrite Negative      RBC 33 / WBC 11 / Hyaline 10 / Gran  / Sq Epi  / Non Sq Epi 6 / Bacteria Negative    Urine Creatinine 45      [03-11-22 @ 15:46]  Urine Sodium 69      [03-11-22 @ 15:46]  Urine Potassium 54      [03-11-22 @ 15:46]  Urine Chloride 58      [03-11-22 @ 15:46]  Urine Osmolality 421      [03-11-22 @ 15:46]    TSH 13.50      [03-14-22 @ 09:18]       [facility] Division of Kidney Diseases & Hypertension  FOLLOW UP NOTE  [phone]--------------------------------------------------------------------------------  Chief Complaint:Sepsis        24 hour events/subjective: Patient seen & examined. Labs & vitals reviewed. Recalled due to hypernatremia. Appears lethargic, opens eyes, non verbal. SNa 150 today        PAST HISTORY  --------------------------------------------------------------------------------  No significant changes to PMH, PSH, FHx, SHx, unless otherwise noted    ALLERGIES & MEDICATIONS  --------------------------------------------------------------------------------  Allergies    Amiodarone Hydrochloride (Other (Severe))    Intolerances      Standing Inpatient Medications  ascorbic acid 500 milliGRAM(s) Oral daily  chlorhexidine 2% Cloths 1 Application(s) Topical <User Schedule>  cholecalciferol 2000 Unit(s) Oral daily  dextrose 5%. 1000 milliLiter(s) IV Continuous <Continuous>  insulin lispro (ADMELOG) corrective regimen sliding scale   SubCutaneous every 6 hours  meropenem  IVPB 1000 milliGRAM(s) IV Intermittent every 12 hours  metoprolol tartrate 50 milliGRAM(s) Oral two times a day  mexiletine 150 milliGRAM(s) Oral every 8 hours  multivitamin 1 Tablet(s) Oral daily  pantoprazole  Injectable 40 milliGRAM(s) IV Push two times a day  senna 2 Tablet(s) Oral at bedtime  sertraline 100 milliGRAM(s) Oral daily  thiamine 100 milliGRAM(s) Oral daily  traMADol 25 milliGRAM(s) Oral once  vancomycin  IVPB 500 milliGRAM(s) IV Intermittent every 12 hours    PRN Inpatient Medications      REVIEW OF SYSTEMS  --------------------------------------------------------------------------------  unable to obtain      VITALS/PHYSICAL EXAM  --------------------------------------------------------------------------------  T(C): 37.2 (03-18-22 @ 08:00), Max: 38.2 (03-17-22 @ 16:25)  HR: 103 (03-18-22 @ 08:00) (103 - 147)  BP: --  RR: 22 (03-18-22 @ 08:00) (19 - 22)  SpO2: 99% (03-18-22 @ 08:00) (91% - 99%)  Wt(kg): --        03-17-22 @ 07:01  -  03-18-22 @ 07:00  --------------------------------------------------------  IN: 0 mL / OUT: 925 mL / NET: -925 mL    03-18-22 @ 07:01  -  03-18-22 @ 11:26  --------------------------------------------------------  IN: 0 mL / OUT: 50 mL / NET: -50 mL      Physical Exam:  	Gen: lethargic, NC, cachectic  	HEENT: dry MM, +NGT  	Pulm: CTA B/L  	CV: S1S2  	Abd: Soft, +BS   	Ext: No LE edema B/L  	Neuro: lethargic  	Skin: Warm and dry  	Vascular access:    LABS/STUDIES  --------------------------------------------------------------------------------              7.0    23.79 >-----------<  165      [03-18-22 @ 06:30]              22.9     150  |  112  |  72  ----------------------------<  121      [03-18-22 @ 06:24]  4.3   |  25  |  1.20        Ca     10.2     [03-18-22 @ 06:24]      Mg     2.1     [03-17-22 @ 06:39]    TPro  9.9  /  Alb  3.4  /  TBili  0.6  /  DBili  x   /  AST  210  /  ALT  149  /  AlkPhos  318  [03-18-22 @ 06:24]        CK 46      [03-17-22 @ 17:20]    Creatinine Trend:  SCr 1.20 [03-18 @ 06:24]  SCr 1.06 [03-17 @ 06:39]  SCr 0.96 [03-16 @ 06:37]  SCr 1.04 [03-15 @ 06:08]  SCr 1.01 [03-14 @ 07:09]    Urinalysis - [03-11-22 @ 15:45]      Color Light Yellow / Appearance Clear / SG 1.016 / pH 7.0      Gluc Trace / Ketone Negative  / Bili Negative / Urobili Negative       Blood Small / Protein 100 / Leuk Est Small / Nitrite Negative      RBC 33 / WBC 11 / Hyaline 10 / Gran  / Sq Epi  / Non Sq Epi 6 / Bacteria Negative    Urine Creatinine 45      [03-11-22 @ 15:46]  Urine Sodium 69      [03-11-22 @ 15:46]  Urine Potassium 54      [03-11-22 @ 15:46]  Urine Chloride 58      [03-11-22 @ 15:46]  Urine Osmolality 421      [03-11-22 @ 15:46]    TSH 13.50      [03-14-22 @ 09:18]

## 2022-03-18 NOTE — PROGRESS NOTE ADULT - SUBJECTIVE AND OBJECTIVE BOX
INFECTIOUS DISEASES FOLLOW UP-- Anitra Prater  [phone]    This is a follow up note for this  66yMale with  Sepsis        ROS:  CONSTITUTIONAL:  opens, eyes, does not follow commands    Allergies    Amiodarone Hydrochloride (Other (Severe))    Intolerances        ANTIBIOTICS/RELEVANT:  antimicrobials  meropenem  IVPB 1000 milliGRAM(s) IV Intermittent every 12 hours  vancomycin  IVPB 500 milliGRAM(s) IV Intermittent every 12 hours    immunologic:    OTHER:  ascorbic acid 500 milliGRAM(s) Oral daily  chlorhexidine 2% Cloths 1 Application(s) Topical <User Schedule>  cholecalciferol 2000 Unit(s) Oral daily  dextrose 5%. 1000 milliLiter(s) IV Continuous <Continuous>  insulin lispro (ADMELOG) corrective regimen sliding scale   SubCutaneous every 6 hours  metoprolol tartrate 50 milliGRAM(s) Oral two times a day  mexiletine 150 milliGRAM(s) Oral every 8 hours  multivitamin 1 Tablet(s) Oral daily  pantoprazole  Injectable 40 milliGRAM(s) IV Push two times a day  polyethylene glycol 3350 17 Gram(s) Oral daily PRN  senna 2 Tablet(s) Oral at bedtime  sertraline 100 milliGRAM(s) Oral daily  thiamine 100 milliGRAM(s) Oral daily  traMADol 25 milliGRAM(s) Oral once      Objective:  Vital Signs Last 24 Hrs  T(C): 36.8 (18 Mar 2022 18:45), Max: 37.4 (18 Mar 2022 00:16)  T(F): 98.2 (18 Mar 2022 18:45), Max: 99.3 (18 Mar 2022 00:16)  HR: 89 (18 Mar 2022 18:45) (89 - 118)  BP: --  BP(mean): 78 (18 Mar 2022 18:45) (72 - 84)  RR: 22 (18 Mar 2022 18:45) (19 - 22)  SpO2: 99% (18 Mar 2022 18:45) (96% - 99%)    PHYSICAL EXAM:  Constitutional:no acute distress, temp trending downward  Eyes:ALONSO, EOMI  Ear/Nose/Throat: no oral lesions, 	  Respiratory: coarse bilat BS  Cardiovascular: S1S2  Gastrointestinal:thin, cholecystotomy tube and LVAD  Extremities:no e/e/c  No Lymphadenopathy  IV sites not inflammed.    LABS:                        7.6    20.49 )-----------( 129      ( 18 Mar 2022 18:42 )             25.3     03-18    145  |  110<H>  |  61<H>  ----------------------------<  158<H>  4.3   |  25  |  1.12    Ca    9.9      18 Mar 2022 18:29  Mg     2.1     03-17    TPro  9.1<H>  /  Alb  3.1<L>  /  TBili  0.4  /  DBili  0.1  /  AST  117<H>  /  ALT  106<H>  /  AlkPhos  250<H>  03-18          MICROBIOLOGY:            RECENT CULTURES:  03-17 @ 14:38  .Blood Blood  --  --  --    No growth to date.  --  03-17 @ 14:37  .Blood Blood  --  --  --    No growth to date.  --      RADIOLOGY & ADDITIONAL STUDIES:    < from: CT Chest No Cont (03.17.22 @ 20:52) >  IMPRESSION:  Right lower lobe mucus plugging and lung opacities, possibly infectious.  Percutaneous cholecystostomy tube in place.    < end of copied text >

## 2022-03-18 NOTE — PROGRESS NOTE ADULT - PROBLEM SELECTOR PLAN 3
was being treated for serratia bacteremia with cefepime  required pressors titrated off and now off midodrine  due to serratia bacteremia- source likely LVAD  bcx 3/6 NGTD; repeat pending  CVP was low despite albumin and IVF in CICU   CTH no acute findings  LVAD exit sites shows no signs of infection  Perc dawn tube site shows no signs of infection  CT showed abdominal aortic thrombus (?infected) and a 2.6cm splenic lesion (?abscess)  initially on vancomycin (2/28-3/1) and on cefepime alone (2/28 - 3/17 )  ID following - abx now broadened to vanco (3/17 - ), meropenem (3/17 - )  -trend WBC  -source is RLL pneumonia  -chest percussion therapy

## 2022-03-18 NOTE — PROGRESS NOTE ADULT - PROBLEM SELECTOR PLAN 1
Initially with Euvolemic Hyponatremia- ADH mediated. Pt was treated with fluid restriction <1L/day, sodium chloride tabs 1 gm tid & torsemide to 5mg PO daily were continued. SNa 136 on 3/15-->148-->150 today  Free water deficit is 2.1L  Start Free water 300 q6 via NGT   f/u BMP  Monitor urine output Initially with Euvolemic Hyponatremia- ADH mediated. Pt was treated with fluid restriction <1L/day, sodium chloride tabs 1 gm tid & torsemide to 5mg PO daily were continued. SNa 136 on 3/15-->148-->150 today  Free water deficit is 2.1L  Start Free water 300 q6 via NGT   May continue D5 @ 50cc/hr  repeat SNa tonight  f/u BMP  Monitor urine output Initially with Euvolemic Hyponatremia- ADH mediated. Pt was treated with fluid restriction <1L/day, sodium chloride tabs 1 gm tid & torsemide to 5mg PO daily were continued. SNa 136 on 3/15-->148-->150 today  Free water deficit is 2.1L  Start Free water 300 q6 via NGT   May continue D5 @ 50cc/hr  repeat SNa tonight  Please check Na again in evening  Monitor urine output  If possible, please mix all meds in D5W

## 2022-03-18 NOTE — CHART NOTE - NSCHARTNOTEFT_GEN_A_CORE
Patient tachycardia improved to 100s. ID and GI recs appreciated. Holding off PEG placement. NGT feeding on hold.   Hbg down-trending, now at 7. 1 unit PRBC ordered - will do slow transfusion. GI made aware of the hgb drop. Awaiting recs. PPI increased to BID  Na up-trending to 150. D5 @ 50cc/hr and renal consulted - Recommending Free water via NGT 300cc Q6h. Order placed. Awaiting final recs    Hemolysis lab ordered, Repeat CBC and BMP once transfusion completed  Will continue to monitor patient closely    Addendum @ 8:50   - Patient only has one PIV and very hard stick. RN attempting to place 2nd PIV. Will prioritize PRBC over D5. RN advised to give free water STAT.

## 2022-03-18 NOTE — PROGRESS NOTE ADULT - ASSESSMENT
66 years old male with PMHx of Stage D HF 2/2 NICM s/p HM2 LVAD in 9/2017 at  (due to severe PAD) with TV ring, multiple GI bleeds, no AC, CKD 3 prior COVID-19 infection in 4/2020, chronic cholecystitis s/p percutaneous cholecystostomy tube, recurrent COVID infection, Serratia bacteremia transferred from [facility] for sepsis.    ID is consulted for septic shock  Recently had Serratia bacteremia discharged on suppressive PO Levaquin  Returned with leukocytosis, fever, AMS, hypotension requiring pressors  ?purulent sputum coming out from previous trach site  CXR no PNA  CTH unrevealing  LVAD exit sites shows no signs of infection  Perc dawn tube site shows no signs of infection  BCx Serratia, S cefepime and ertapenem, R quinolones  CT showed abdominal aortic thrombus and a 2.6cm splenic lesion    Antibiotics:  Vancomycin 2/28 - 3/1  Zosyn 2/28  Cefepime 2/28 -     Currently unclear etiology of septic shock likely secondary to recurrent serratia bacteremia  Source of serratia is could be LVAD, infected aortic thrombus or splenic lesion  Regardless will treat this as in intravascular infection so likely 4 - 6 weeks in total    IMPRESSION:  Septic shock 2/2 serratia bacteremia  Aortic thrombus  Splenic lesion  Leukocytosis  Fever  LVAD      RECOMMENDATIONS:  Patient with new fevers, tachycardia, tachypnea    differential aspiration event in the setting of NG feeds, bacteremia secondary to a line source, COVID acquisition intra-abdominal process    Sent blood cultures x2 sets 3/17 with NGTD  CT of chest suggestive of mucus plugging with possible aspiration pneumonia event  continue Vancomycin 15mg/kg q 12hr plus meropenem 1 gram IV q 8hr  check Vancomycin trough prior to next dose    Please hold off on PEG placement    Discussed with heart failure team    Morris Prater MD  Can be called via Teams  After 5pm/weekends [phone]

## 2022-03-18 NOTE — CHART NOTE - NSCHARTNOTEFT_GEN_A_CORE
**INCOMPLETE**    Nutrition Follow Up Note  Patient seen for: length of stay follow up. Chart reviewed and events noted.    Per Chart: Pt is a 66M, PMH of Stage D 2/2 NICM s/p HM2 LVAD in 2017 at  (due to severe PAD) with TV ring, multiple GI bleeds, thus maintained off AC, CKD3, prior COVID-19 infection in 2020, recurrent syncope post LVAD s/p ILR, s/p prolonged complex hospitalization from -2021 initially admitted with abdominal pain complicated by GIB, respiratory failure s/p trach, multiple infections, s/p cholecystotomy tube and SMA stent 10/2021, ultimately discharged to [facility] rehab and then returned to [facility] for trach decannulation , readmitted in 2022 with recurrent COVID-19 infection, initially in distributive shock. Presented from rehab center due to AMS and tachycardia; was placed on BiPAP and transferred to [facility] CICU for concern for sepsis vs septic shock. Geriatrics and Palliative Medicine (GaP) Team was called for goals of care     Source: [] Patient       [x] Medical Record        [x] RN        [] Family at bedside       [] Other:    -If unable to interview patient: [] Trach/Vent/BiPAP  [x] Disoriented/confused/inappropriate to interview    Diet Order:   Diet, NPO:   Except Medications (22)  Pt was ordered for Jevity 1.5 at goal rate 45 mL/hr x24 hrs with Reji x2 daily and No Carb ProSource TF x1 daily (3/10-3/17). However, NGT feeds held 3/13/2022 secondary to pt coughing and with SOB.     - Is current order appropriate/adequate? [] Yes  [x]  No:     - Nutrition-related concerns:      - On ascorbic acid, cholecalciferol, thiamine, and multivitamin       - On antibiotics      - Elevated blood glucose noted 3/17 - received vancomycin in dextrose 5%. On sliding scale of insulin       - Pt with elevated Na. Ordered for free water 300 mL q 6 hours.       - No documented feeds running since 3/13 as pt noted with coughing and SOB    GI:  Last BM documented on 3/9 - .   Bowel Regimen? [] Yes   [x] No  Drain (dawn): 50 mL (3/18), 25 mL (3/17), 140 mL (3/16), 100 mL (3/15), 55 mL (3/14), 0 mL (3/13)    Weights:   Daily Weight in k.2 (-), 57.3 (-), 54.4 ()  Dosing Weight: 107.3 Ibs/48.7 kg (-)  BMI (kg/m2): 15.4 (-)  Ideal Body Weight: 166 Ibs/75.3 kg  Pt with wt fluctuations noted during admission likely secondary to fluid shifts (varying edema during admission) vs true wt loss vs bed scale discrepancy  RD will continue to trend as new wts available/able.      Nutritionally Pertinent MEDICATIONS  (STANDING):  ascorbic acid  cholecalciferol  dextrose 5%.  insulin lispro (ADMELOG) corrective regimen sliding scale  meropenem  IVPB  metoprolol tartrate  mexiletine  multivitamin  pantoprazole  Injectable  senna  thiamine  vancomycin  IVPB    Pertinent Labs:  @ 06:24: Na 150<H>, BUN 72<H>, Cr 1.20, <H>, K+ 4.3, Alk Phos 318<H>, ALT/SGPT 149<H>, AST/SGOT 210<H>    A1C with Estimated Average Glucose Result: 5.3 % (22 @ 22:11)    Finger Sticks:  POCT Blood Glucose.: 140 mg/dL ( @ 05:54)  POCT Blood Glucose.: 142 mg/dL ( @ 03:35)  POCT Blood Glucose.: 139 mg/dL ( @ 21:41)  POCT Blood Glucose.: 214 mg/dL ( @ 16:53)    Skin per nursing documentation: LVAD driveline site, + trach stoma, per wound care on 3/4: Sacral/bilateral buttocks deep tissue injuries, Peg. heel deep tissue injury, scrotal wound  Edema per nursing documentation: None noted.     Estimated Needs:   [x] no change since previous assessment  Based on dosing wt 48.7 kG  Estimated Energy Needs: (30-35 kcals/kG) 0741-2374 kcals   Estimated Protein Needs: (1.4-1.9 gm/kG) 68.18-92.53 gm  Fluid needs deferred to provider.     Previous Nutrition Diagnosis: Malnutrition severe, chronic + Underweight   Nutrition Diagnosis is: [x] ongoing  [] resolved [] not applicable     Nutrition Care Plan:  [x] In Progress  [] Achieved  [] Not applicable    New Nutrition Diagnosis: [x] Not applicable    Nutrition Interventions:     Education Provided   [] Yes:  [x] No: Not appropriate     Recommendations:          Monitoring and Evaluation:   Continue to monitor nutritional intake, tolerance to diet prescription, weights, labs, skin integrity    RD remains available upon request and will follow up per protocol  Kiki Barajas, MS, RD, CDN, [facility] Pager #[phone] Nutrition Follow Up Note  Patient seen for: length of stay follow up. Chart reviewed and events noted.    Per Chart: Pt is a 66M, PMH of Stage D 2/2 NICM s/p HM2 LVAD in 2017 at  (due to severe PAD) with TV ring, multiple GI bleeds, thus maintained off AC, CKD3, prior COVID-19 infection in 2020, recurrent syncope post LVAD s/p ILR, s/p prolonged complex hospitalization from -2021 initially admitted with abdominal pain complicated by GIB, respiratory failure s/p trach, multiple infections, s/p cholecystotomy tube and SMA stent 10/2021, ultimately discharged to [facility] rehab and then returned to [facility] for trach decannulation , readmitted in 2022 with recurrent COVID-19 infection, initially in distributive shock. Presented from rehab center due to AMS and tachycardia; was placed on BiPAP and transferred to [facility] CICU for concern for sepsis vs septic shock. Geriatrics and Palliative Medicine (GaP) Team was called for goals of care     Source: [] Patient       [x] Medical Record        [x] RN        [] Family at bedside       [] Other:    -If unable to interview patient: [] Trach/Vent/BiPAP  [x] Disoriented/confused/inappropriate to interview    Diet Order:   Diet, NPO:   Except Medications (22)  Pt was ordered for Jevity 1.5 at goal rate 45 mL/hr x24 hrs with Reji x2 daily and No Carb ProSource TF x1 daily (3/10-3/17).   NGT feeds held 3/13/2022 secondary to pt coughing and with SOB. Provider to RN to resume feeds on 3/13.     - Is current order appropriate/adequate? [] Yes  [x]  No:     - Nutrition-related concerns:      - On ascorbic acid, cholecalciferol, thiamine, and multivitamin       - On antibiotics      - Elevated blood glucose noted 3/17 - received vancomycin in dextrose 5%. On sliding scale of insulin       - Pt with elevated Na. Ordered for free water 300 mL q 6 hours.       - No documented feeds running since 3/13 as pt noted with coughing and SOB    GI:  Last BM documented on 3/9 - .   Bowel Regimen? [] Yes   [x] No  Drain (dawn): 50 mL (3/18), 25 mL (3/17), 140 mL (3/16), 100 mL (3/15), 55 mL (3/14), 0 mL (3/13)    Weights:   Daily Weight in k.2 (), 57.3 (), 54.4 ()  Dosing Weight: 107.3 Ibs/48.7 kg (-)  BMI (kg/m2): 15.4 (-)  Ideal Body Weight: 166 Ibs/75.3 kg  Pt with wt fluctuations noted during admission likely secondary to fluid shifts (varying edema during admission) vs true wt loss vs bed scale discrepancy  RD will continue to trend as new wts available/able.      Nutritionally Pertinent MEDICATIONS  (STANDING):  ascorbic acid  cholecalciferol  dextrose 5%.  insulin lispro (ADMELOG) corrective regimen sliding scale  meropenem  IVPB  metoprolol tartrate  mexiletine  multivitamin  pantoprazole  Injectable  senna  thiamine  vancomycin  IVPB    Pertinent Labs:  @ 06:24: Na 150<H>, BUN 72<H>, Cr 1.20, <H>, K+ 4.3, Alk Phos 318<H>, ALT/SGPT 149<H>, AST/SGOT 210<H>    A1C with Estimated Average Glucose Result: 5.3 % (22 @ 22:11)    Finger Sticks:  POCT Blood Glucose.: 140 mg/dL ( @ 05:54)  POCT Blood Glucose.: 142 mg/dL ( @ 03:35)  POCT Blood Glucose.: 139 mg/dL ( @ 21:41)  POCT Blood Glucose.: 214 mg/dL ( @ 16:53)    Skin per nursing documentation: LVAD driveline site, + trach stoma, per wound care on 3/4: Sacral/bilateral buttocks deep tissue injuries, Peg. heel deep tissue injury, scrotal wound  Edema per nursing documentation: None noted.     Estimated Needs:   [x] no change since previous assessment  Based on dosing wt 48.7 kG  Estimated Energy Needs: (30-35 kcals/kG) 9957-6507 kcals   Estimated Protein Needs: (1.4-1.9 gm/kG) 68.18-92.53 gm  Fluid needs deferred to provider.     Previous Nutrition Diagnosis: Malnutrition severe, chronic + Underweight   Nutrition Diagnosis is: [x] ongoing  [] resolved [] not applicable     Nutrition Care Plan:  [x] In Progress  [] Achieved  [] Not applicable    New Nutrition Diagnosis: [x] Not applicable    Nutrition Interventions:     Education Provided   [] Yes:  [x] No: Not appropriate     Recommendations:          Monitoring and Evaluation:   Continue to monitor nutritional intake, tolerance to diet prescription, weights, labs, skin integrity    RD remains available upon request and will follow up per protocol  Kiki Barajas MS, RD, CDN, [facility] Pager #[phone] Nutrition Follow Up Note  Patient seen for: length of stay follow up. Chart reviewed and events noted.    Per Chart: Pt is a 66M, PMH of Stage D 2/2 NICM s/p HM2 LVAD in 2017 at  (due to severe PAD) with TV ring, multiple GI bleeds, thus maintained off AC, CKD3, prior COVID-19 infection in 2020, recurrent syncope post LVAD s/p ILR, s/p prolonged complex hospitalization from -2021 initially admitted with abdominal pain complicated by GIB, respiratory failure s/p trach, multiple infections, s/p cholecystotomy tube and SMA stent 10/2021, ultimately discharged to [email or web address] rehab and then returned to [facility] for trach decannulation , readmitted in 2022 with recurrent COVID-19 infection, initially in distributive shock. Presented from rehab center due to AMS and tachycardia; was placed on BiPAP and transferred to [facility] CICU for concern for sepsis vs septic shock. Geriatrics and Palliative Medicine (GaP) Team was called for goals of care     Source: [] Patient       [x] Medical Record        [x] RN        [] Family at bedside       [] Other:    -If unable to interview patient: [] Trach/Vent/BiPAP  [x] Disoriented/confused/inappropriate to interview    Diet Order:   Diet, NPO:   Except Medications (22)  Pt was ordered for Jevity 1.5 at goal rate 45 mL/hr x24 hrs with Reji x2 daily and No Carb ProSource TF x1 daily (3/10-3/17).   NGT feeds held 3/13/2022 secondary to pt coughing and with SOB. Provider to RN to resume feeds on 3/13.   Made NPO after midnight on 3/17 for PEG.   Per Internal 3/18, "new septic shock due to RLL pneumonia. possible aspiration pneumonia or mucous plugging. tube feeds on hold"    - Is current order appropriate/adequate? [] Yes  [x]  No:     - Nutrition-related concerns:      - On ascorbic acid, cholecalciferol, thiamine, and multivitamin       - On antibiotics      - Elevated blood glucose noted 3/17 - received vancomycin in dextrose 5%. On sliding scale of insulin       - Pt with elevated Na. Ordered for free water 300 mL q 6 hours.       - No documented feeds running since 3/13. Per chart note 3/18 "PEG canceled as patient is febrile again, worsening leukocytosis and very tachycardic." Per heart failure 3/18 "Family meeting has been arranged for tomorrow to discuss GOC and next steps."    GI:  Last BM documented on 3/9 - .   Bowel Regimen? [] Yes   [x] No  Drain (dawn): 50 mL (3/18), 25 mL (3/17), 140 mL (3/16), 100 mL (3/15), 55 mL (3/14), 0 mL (3/13)    Weights:   Daily Weight in k.2 (), 57.3 (), 54.4 ()  Dosing Weight: 107.3 Ibs/48.7 kg ()  BMI (kg/m2): 15.4 ()  Ideal Body Weight: 166 Ibs/75.3 kg  Pt with wt fluctuations noted during admission likely secondary to fluid shifts (varying edema during admission) vs true wt loss vs bed scale discrepancy  RD will continue to trend as new wts available/able.      Nutritionally Pertinent MEDICATIONS  (STANDING):  ascorbic acid  cholecalciferol  dextrose 5%.  insulin lispro (ADMELOG) corrective regimen sliding scale  meropenem  IVPB  metoprolol tartrate  mexiletine  multivitamin  pantoprazole  Injectable  senna  thiamine  vancomycin  IVPB    Pertinent Labs:  @ 06:24: Na 150<H>, BUN 72<H>, Cr 1.20, <H>, K+ 4.3, Alk Phos 318<H>, ALT/SGPT 149<H>, AST/SGOT 210<H>    A1C with Estimated Average Glucose Result: 5.3 % (22 @ 22:11)    Finger Sticks:  POCT Blood Glucose.: 140 mg/dL ( @ 05:54)  POCT Blood Glucose.: 142 mg/dL ( @ 03:35)  POCT Blood Glucose.: 139 mg/dL ( @ 21:41)  POCT Blood Glucose.: 214 mg/dL ( @ 16:53)    Skin per nursing documentation: LVAD driveline site, + trach stoma, per wound care on 3/4: Sacral/bilateral buttocks deep tissue injuries, Peg. heel deep tissue injury, scrotal wound  Edema per nursing documentation: None noted.     Estimated Needs:   [x] no change since previous assessment  Based on dosing wt 48.7 kG  Estimated Energy Needs: (30-35 kcals/kG) 7665-6290 kcals   Estimated Protein Needs: (1.4-1.9 gm/kG) 68.18-92.53 gm  Fluid needs deferred to provider.     Previous Nutrition Diagnosis: Malnutrition severe, chronic + Underweight   Nutrition Diagnosis is: [x] ongoing  [] resolved [] not applicable     Nutrition Care Plan:  [] In Progress  [] Achieved  [x] Not applicable - current medical condition precludes RD intervention at this time.     New Nutrition Diagnosis: [x] Not applicable    Nutrition Interventions:     Education Provided   [] Yes:  [x] No: Not appropriate     Recommendations:      1) When medically feasible, resume feeds of Jevity 1.5 at 10 mL/hr, increasing only as tolerated to goal rate of 45 mL/hr x 24hrs + 1 No Carb Prosource TF. At goal to provide 1080 ml formula, 1660 kcals, 80 gm Protein, and 821 mL free water. Meets 34 kcals/kG & 1.6 gm protein/kG based on dosing wt 48.7 kG.  --> Monitor GOC  --> Keep HOB elevated >45 degrees during feeds.  2) As medically feasible, continue to provide micronutrients.   3) If medically feasible, recommend Reji x2 daily.     Monitoring and Evaluation:   Continue to monitor nutritional intake, tolerance to diet prescription, weights, labs, skin integrity    RD remains available upon request and will follow up per protocol  Kiki Barajas, MS, RD, CDN, [facility] Pager #[phone] Nutrition Follow Up Note  Patient seen for: length of stay follow up. Chart reviewed and events noted.    Per Chart: Pt is a 66M, PMH of Stage D 2/2 NICM s/p HM2 LVAD in 2017 at  (due to severe PAD) with TV ring, multiple GI bleeds, thus maintained off AC, CKD3, prior COVID-19 infection in 2020, recurrent syncope post LVAD s/p ILR, s/p prolonged complex hospitalization from -2021 initially admitted with abdominal pain complicated by GIB, respiratory failure s/p trach, multiple infections, s/p cholecystotomy tube and SMA stent 10/2021, ultimately discharged to [facility] rehab and then returned to [facility] for trach decannulation , readmitted in 2022 with recurrent COVID-19 infection, initially in distributive shock. Presented from rehab center due to AMS and tachycardia; was placed on BiPAP and transferred to [facility] CICU for concern for sepsis vs septic shock. Geriatrics and Palliative Medicine (GaP) Team was called for goals of care     Source: [] Patient       [x] Medical Record        [x] RN        [] Family at bedside       [] Other:    -If unable to interview patient: [] Trach/Vent/BiPAP  [x] Disoriented/confused/inappropriate to interview    Diet Order:   Diet, NPO:   Except Medications (22)  Pt was ordered for Jevity 1.5 at goal rate 45 mL/hr x24 hrs with Reji x2 daily and No Carb ProSource TF x1 daily (3/10-3/17).   NGT feeds held 3/13/2022 secondary to pt coughing and with SOB. Provider to RN to resume feeds on 3/13.   Made NPO after midnight on 3/17 for PEG.   Per Internal 3/18, "new septic shock due to RLL pneumonia. possible aspiration pneumonia or mucous plugging. tube feeds on hold"    - Is current order appropriate/adequate? [] Yes  [x]  No:     - Nutrition-related concerns:      - On ascorbic acid, cholecalciferol, thiamine, and multivitamin       - On antibiotics      - Elevated blood glucose noted 3/17 - received vancomycin in dextrose 5%. On sliding scale of insulin       - Pt with elevated Na. Ordered for free water 300 mL q 6 hours.       - No documented feeds running since 3/13. Per chart note 3/18 "PEG canceled as patient is febrile again, worsening leukocytosis and very tachycardic." Per heart failure 3/18 "Family meeting has been arranged for tomorrow to discuss GOC and next steps."    GI:  Last BM documented on 3/9 - .   Bowel Regimen? [] Yes   [x] No  Drain (dawn): 50 mL (3/18), 25 mL (3/17), 140 mL (3/16), 100 mL (3/15), 55 mL (3/14), 0 mL (3/13)    Weights:   Daily Weight in k.2 (), 57.3 (), 54.4 ()  Dosing Weight: 107.3 Ibs/48.7 kg ()  BMI (kg/m2): 15.4 ()  Ideal Body Weight: 166 Ibs/75.3 kg  Pt with wt fluctuations noted during admission likely secondary to fluid shifts (varying edema during admission) vs true wt loss vs bed scale discrepancy  RD will continue to trend as new wts available/able.      Nutritionally Pertinent MEDICATIONS  (STANDING):  ascorbic acid  cholecalciferol  dextrose 5%.  insulin lispro (ADMELOG) corrective regimen sliding scale  meropenem  IVPB  metoprolol tartrate  mexiletine  multivitamin  pantoprazole  Injectable  senna  thiamine  vancomycin  IVPB    Pertinent Labs:  @ 06:24: Na 150<H>, BUN 72<H>, Cr 1.20, <H>, K+ 4.3, Alk Phos 318<H>, ALT/SGPT 149<H>, AST/SGOT 210<H>    A1C with Estimated Average Glucose Result: 5.3 % (22 @ 22:11)    Finger Sticks:  POCT Blood Glucose.: 140 mg/dL ( @ 05:54)  POCT Blood Glucose.: 142 mg/dL ( @ 03:35)  POCT Blood Glucose.: 139 mg/dL ( @ 21:41)  POCT Blood Glucose.: 214 mg/dL ( @ 16:53)    Skin per nursing documentation: LVAD driveline site, + trach stoma, per wound care on 3/4: Sacral/bilateral buttocks deep tissue injuries, Peg. heel deep tissue injury, scrotal wound  Edema per nursing documentation: None noted.     Estimated Needs:   [x] no change since previous assessment  Based on dosing wt 48.7 kG  Estimated Energy Needs: (30-35 kcals/kG) 6073-7464 kcals   Estimated Protein Needs: (1.4-1.9 gm/kG) 68.18-92.53 gm  Fluid needs deferred to provider.     Previous Nutrition Diagnosis: Malnutrition severe, chronic + Underweight   Nutrition Diagnosis is: [x] ongoing  [] resolved [] not applicable     Nutrition Care Plan:  [] In Progress  [] Achieved  [x] Not applicable - current medical condition precludes RD intervention at this time.     New Nutrition Diagnosis: [x] Not applicable    Nutrition Interventions:     Education Provided   [] Yes:  [x] No: Not appropriate     Recommendations:      1) When medically feasible, resume feeds of Jevity 1.5 at 10 mL/hr, increasing only as tolerated to goal rate of 45 mL/hr x 24hrs + 1 No Carb Prosource TF. At goal to provide 1080 ml formula, 1660 kcals, 80 gm Protein, and 821 mL free water. Meets 34 kcals/kG & 1.6 gm protein/kG based on dosing wt 48.7 kG.  --> Monitor GOC  --> Keep HOB elevated >45 degrees during feeds.  --> Fluid needs deferred to provider.   2) As medically feasible, continue to provide micronutrients.   3) If medically feasible, recommend Reji x2 daily.     Monitoring and Evaluation:   Continue to monitor nutritional intake, tolerance to diet prescription, weights, labs, skin integrity    RD remains available upon request and will follow up per protocol  Kiki Barajas, MS, RD, CDN, [facility] Pager #[phone] Nutrition Follow Up Note  Patient seen for: length of stay follow up. Chart reviewed and events noted.    Per Chart: Pt is a 66M, PMH of Stage D 2/2 NICM s/p HM2 LVAD in 2017 at  (due to severe PAD) with TV ring, multiple GI bleeds, thus maintained off AC, CKD3, prior COVID-19 infection in 2020, recurrent syncope post LVAD s/p ILR, s/p prolonged complex hospitalization from -2021 initially admitted with abdominal pain complicated by GIB, respiratory failure s/p trach, multiple infections, s/p cholecystotomy tube and SMA stent 10/2021, ultimately discharged to [facility] rehab and then returned to [facility] for trach decannulation , readmitted in 2022 with recurrent COVID-19 infection, initially in distributive shock. Presented from rehab center due to AMS and tachycardia; was placed on BiPAP and transferred to [facility] CICU for concern for sepsis vs septic shock. Geriatrics and Palliative Medicine (GaP) Team was called for goals of care     Source: [] Patient       [x] Medical Record        [x] RN        [] Family at bedside       [X] Other: Provider    -If unable to interview patient: [] Trach/Vent/BiPAP  [x] Disoriented/confused/inappropriate to interview    Diet Order:   Diet, NPO:   Except Medications (22)  Pt was ordered for Jevity 1.5 at goal rate 45 mL/hr x24 hrs with Reji x2 daily and No Carb ProSource TF x1 daily (3/10-3/17).   NGT feeds held 3/13/2022 secondary to pt coughing and with SOB. Provider to RN to resume feeds on 3/13.   Made NPO after midnight on 3/17 for PEG.   Per Internal 3/18, "new septic shock due to RLL pneumonia. possible aspiration pneumonia or mucous plugging. tube feeds on hold"  Feeds on hold for now per provider     - Is current order appropriate/adequate? [] Yes  [x]  No:     - Nutrition-related concerns:      - On ascorbic acid, cholecalciferol, thiamine, and multivitamin       - On antibiotics      - Elevated blood glucose noted 3/17 - received vancomycin in dextrose 5%. On sliding scale of insulin       - Pt with elevated Na. Ordered for free water 300 mL q 6 hours.       - No documented feeds running since 3/13. Per chart note 3/18 "PEG canceled as patient is febrile again, worsening leukocytosis and very tachycardic." Per heart failure 3/18 "Family meeting has been arranged for tomorrow to discuss GOC and next steps."    GI:  Last BM documented on 3/9 - provider and RN made aware.   Bowel Regimen? [] Yes   [x] No  Drain (dawn): 50 mL (3/18), 25 mL (3/17), 140 mL (3/16), 100 mL (3/15), 55 mL (3/14), 0 mL (3/13)    Weights:   Daily Weight in k.2 (), 57.3 (), 54.4 ()  Dosing Weight: 107.3 Ibs/48.7 kg (-)  BMI (kg/m2): 15.4 (-)  Ideal Body Weight: 166 Ibs/75.3 kg  Pt with wt fluctuations noted during admission likely secondary to fluid shifts (varying edema during admission) vs true wt loss vs bed scale discrepancy  RD will continue to trend as new wts available/able.      Nutritionally Pertinent MEDICATIONS  (STANDING):  ascorbic acid  cholecalciferol  dextrose 5%.  insulin lispro (ADMELOG) corrective regimen sliding scale  meropenem  IVPB  metoprolol tartrate  mexiletine  multivitamin  pantoprazole  Injectable  senna  thiamine  vancomycin  IVPB    Pertinent Labs:  @ 06:24: Na 150<H>, BUN 72<H>, Cr 1.20, <H>, K+ 4.3, Alk Phos 318<H>, ALT/SGPT 149<H>, AST/SGOT 210<H>    A1C with Estimated Average Glucose Result: 5.3 % (22 @ 22:11)    Finger Sticks:  POCT Blood Glucose.: 140 mg/dL ( @ 05:54)  POCT Blood Glucose.: 142 mg/dL ( @ 03:35)  POCT Blood Glucose.: 139 mg/dL ( @ 21:41)  POCT Blood Glucose.: 214 mg/dL ( @ 16:53)    Skin per nursing documentation: LVAD driveline site, + trach stoma, per wound care on 3/4: Sacral/bilateral buttocks deep tissue injuries, Peg. heel deep tissue injury, scrotal wound  Edema per nursing documentation: None noted.     Estimated Needs:   [x] no change since previous assessment  Based on dosing wt 48.7 kG  Estimated Energy Needs: (30-35 kcals/kG) 0076-9932 kcals   Estimated Protein Needs: (1.4-1.9 gm/kG) 68.18-92.53 gm  Fluid needs deferred to provider.     Previous Nutrition Diagnosis: Malnutrition severe, chronic + Underweight   Nutrition Diagnosis is: [x] ongoing  [] resolved [] not applicable     Nutrition Care Plan:  [] In Progress  [] Achieved  [x] Not applicable - current medical condition precludes RD intervention at this time.     New Nutrition Diagnosis: [x] Not applicable    Nutrition Interventions:     Education Provided   [] Yes:  [x] No: Not appropriate     Recommendations:      1) When medically feasible, resume feeds of Jevity 1.5 at 10 mL/hr, increasing only as tolerated to goal rate of 45 mL/hr x 24hrs + 1 No Carb Prosource TF. At goal to provide 1080 ml formula, 1660 kcals, 80 gm Protein, and 821 mL free water. Meets 34 kcals/kG & 1.6 gm protein/kG based on dosing wt 48.7 kG.  --> Monitor GOC  --> Keep HOB elevated >45 degrees during feeds.  --> Fluid needs deferred to provider.   2) As medically feasible, continue to provide micronutrients.   3) If medically feasible, recommend Reji x2 daily.     Monitoring and Evaluation:   Continue to monitor nutritional intake, tolerance to diet prescription, weights, labs, skin integrity    RD remains available upon request and will follow up per protocol  Kiki Barajas, MS, RD, CDN, [facility] Pager #[phone]

## 2022-03-19 NOTE — PROGRESS NOTE ADULT - PROBLEM SELECTOR PLAN 1
- 2/28 BCx + serratia/GNR  - WBC remains elevated, improved with broadening antibiotics. No clear source.  - palliative care following, family meeting held on 3/17. At this time the family wishes to continue with course of treatment and would like PEG to be placed. Will discuss next week if patient becomes more stable.

## 2022-03-19 NOTE — PROGRESS NOTE ADULT - ASSESSMENT
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 s/p HM2 LVAD in 9/2017 at  (due to severe PAD) with TV ring, multiple GI bleeds, thus maintained off AC, prior COVID-19 infection in 4/2020, recurrent syncope post LVAD s/p ILR, prolonged complex hospitalization from 6/14-11/16/2021 with GIB, respiratory failure s/p trach decanulated 12/2021, multiple infections, s/p cholecystotomy tube and SMA stent 10/2021, recurrent COVID 19 reinfection s/p MAB/remdesivir/steroids and distributive shock with serratia bacteremia, recurrent VT on mexiletine transferred from [facility] ED with metabolic encephalopathy, hypercarbic respiratory failure weaned off bipap, septic shock requiring pressors (now on midodrine) with serratia bacteremia on cefepime, intermittent h/h drop s/p total 3units prbc (last 3/6), episodes of recurrent VT s/p lidocaine gtt now back on mexiletine.

## 2022-03-19 NOTE — PROGRESS NOTE ADULT - PROBLEM SELECTOR PLAN 5
Intermittent h/h drop s/p total 4 units prbc (last 3/16)  Per CHF recommendations will transfuse one unit PRBCs today  No s/s of active GI bleed  Daily CBC

## 2022-03-19 NOTE — PROGRESS NOTE ADULT - SUBJECTIVE AND OBJECTIVE BOX
Mich Krueger MD    Patient is a 66y old  Male who presents with a chief complaint of Septic shock (18 Mar 2022 20:38)        SUBJECTIVE / OVERNIGHT EVENTS: No new events  TELEMETRY: SR/ST 's      MEDICATIONS  (STANDING):  ascorbic acid 500 milliGRAM(s) Oral daily  chlorhexidine 2% Cloths 1 Application(s) Topical <User Schedule>  cholecalciferol 2000 Unit(s) Oral daily  dextrose 5%. 1000 milliLiter(s) (50 mL/Hr) IV Continuous <Continuous>  insulin lispro (ADMELOG) corrective regimen sliding scale   SubCutaneous every 6 hours  meropenem  IVPB 1000 milliGRAM(s) IV Intermittent every 12 hours  metoprolol tartrate 50 milliGRAM(s) Oral two times a day  mexiletine 150 milliGRAM(s) Oral every 8 hours  multivitamin 1 Tablet(s) Oral daily  pantoprazole  Injectable 40 milliGRAM(s) IV Push two times a day  senna 2 Tablet(s) Oral at bedtime  sertraline 100 milliGRAM(s) Oral daily  thiamine 100 milliGRAM(s) Oral daily  traMADol 25 milliGRAM(s) Oral once  vancomycin  IVPB 500 milliGRAM(s) IV Intermittent every 12 hours    MEDICATIONS  (PRN):  ibuprofen  Suspension. 400 milliGRAM(s) Oral every 6 hours PRN Temp greater or equal to 38C (100.4F), Mild Pain (1 - 3)  polyethylene glycol 3350 17 Gram(s) Oral daily PRN Constipation      Vital Signs Last 24 Hrs  T(C): 36.6 (19 Mar 2022 05:15), Max: 36.9 (18 Mar 2022 12:00)  T(F): 97.9 (19 Mar 2022 05:15), Max: 98.4 (18 Mar 2022 12:00)  HR: 112 (19 Mar 2022 05:15) (89 - 112)  BP: --  BP(mean): 80 (19 Mar 2022 05:15) (78 - 84)  RR: 22 (19 Mar 2022 05:15) (20 - 22)  SpO2: 100% (19 Mar 2022 05:15) (99% - 100%)  CAPILLARY BLOOD GLUCOSE      POCT Blood Glucose.: 132 mg/dL (19 Mar 2022 05:58)  POCT Blood Glucose.: 141 mg/dL (19 Mar 2022 00:29)  POCT Blood Glucose.: 155 mg/dL (18 Mar 2022 16:39)  POCT Blood Glucose.: 137 mg/dL (18 Mar 2022 12:41)    I&O's Summary    18 Mar 2022 07:01  -  19 Mar 2022 07:00  --------------------------------------------------------  IN: 700 mL / OUT: 1200 mL / NET: -500 mL          PHYSICAL EXAM  GENERAL: NAD, cachetic  HEAD:  Atraumatic, normocephalic; +NGT  EYES: EOMI, PERRLA, conjunctiva and sclera clear  CHEST/LUNG: Clear to auscultation bilaterally; no wheezes  HEART: +LVAD hum  ABDOMEN: Soft, nontender, nondistended; bowel sounds present; R sided driveline with clean dressing in place  EXTREMITIES:  2+ peripheral pulses; no clubbing, cyanosis, or edema  PSYCH: AAOx0, opens eyes slightly to name      LABS:                        7.1    15.34 )-----------( 115      ( 19 Mar 2022 07:05 )             22.4     03-19    144  |  110<H>  |  52<H>  ----------------------------<  115<H>  4.1   |  26  |  1.05    Ca    9.8      19 Mar 2022 06:11  Phos  2.4     03-19  Mg     2.2     03-19    TPro  9.5<H>  /  Alb  3.1<L>  /  TBili  0.6  /  DBili  x   /  AST  83<H>  /  ALT  85<H>  /  AlkPhos  242<H>  03-19      CARDIAC MARKERS ( 17 Mar 2022 17:20 )  x     / x     / 46 U/L / x     / 1.5 ng/mL          Culture - Blood (collected 17 Mar 2022 14:38)  Source: .Blood Blood  Preliminary Report (18 Mar 2022 15:01):    No growth to date.    Culture - Blood (collected 17 Mar 2022 14:37)  Source: .Blood Blood  Preliminary Report (18 Mar 2022 15:01):    No growth to date.        RADIOLOGY & ADDITIONAL TESTS:    Imaging Personally Reviewed:  Consultant(s) Notes Reviewed:    Care Discussed with Consultants/Other Providers:

## 2022-03-19 NOTE — PROGRESS NOTE ADULT - SUBJECTIVE AND OBJECTIVE BOX
INFECTIOUS DISEASES FOLLOW UP-- Anitra Prater  [phone]    This is a follow up note for this  66yMale with  Sepsis  opens eyes- non verbal        ROS:  CONSTITUTIONAL: cachectic non interactive    Allergies    Amiodarone Hydrochloride (Other (Severe))    Intolerances        ANTIBIOTICS/RELEVANT:  antimicrobials  meropenem  IVPB 1000 milliGRAM(s) IV Intermittent every 12 hours  vancomycin  IVPB 500 milliGRAM(s) IV Intermittent every 12 hours    immunologic:    OTHER:  ascorbic acid 500 milliGRAM(s) Oral daily  chlorhexidine 2% Cloths 1 Application(s) Topical <User Schedule>  cholecalciferol 2000 Unit(s) Oral daily  dextrose 5%. 1000 milliLiter(s) IV Continuous <Continuous>  insulin lispro (ADMELOG) corrective regimen sliding scale   SubCutaneous every 6 hours  metoprolol tartrate 50 milliGRAM(s) Oral two times a day  mexiletine 150 milliGRAM(s) Oral every 8 hours  multivitamin 1 Tablet(s) Oral daily  pantoprazole  Injectable 40 milliGRAM(s) IV Push two times a day  polyethylene glycol 3350 17 Gram(s) Oral daily PRN  senna 2 Tablet(s) Oral at bedtime  sertraline 100 milliGRAM(s) Oral daily  thiamine 100 milliGRAM(s) Oral daily  traMADol 25 milliGRAM(s) Oral once      Objective:  Vital Signs Last 24 Hrs  T(C): 37 (19 Mar 2022 08:00), Max: 37 (19 Mar 2022 08:00)  T(F): 98.6 (19 Mar 2022 08:00), Max: 98.6 (19 Mar 2022 08:00)  HR: 86 (19 Mar 2022 08:00) (86 - 112)  BP: --  BP(mean): 84 (19 Mar 2022 08:00) (78 - 84)  RR: 22 (19 Mar 2022 08:00) (20 - 22)  SpO2: 100% (19 Mar 2022 08:00) (99% - 100%)    PHYSICAL EXAM:  Constitutional:no acute distress, cachectic lying in bed  Eyes:ALONSO,   Ear/Nose/Throat: no oral lesions, old trach stoma 	  Respiratory: clear BL coarse  Cardiovascular: S1S2 VAD sounds  Gastrointestinal:soft, (+) BS, no tenderness, thin cholecystotomy tube remains  LVAD dressing intact  Extremities:no e/e/c  No Lymphadenopathy  IV sites not inflammed.    LABS:                        7.1    15.34 )-----------( 115      ( 19 Mar 2022 07:05 )             22.4     03-19    144  |  110<H>  |  52<H>  ----------------------------<  115<H>  4.1   |  26  |  1.05    Ca    9.8      19 Mar 2022 06:11  Phos  2.4     03-19  Mg     2.2     03-19    TPro  9.5<H>  /  Alb  3.1<L>  /  TBili  0.6  /  DBili  x   /  AST  83<H>  /  ALT  85<H>  /  AlkPhos  242<H>  03-19          MICROBIOLOGY:      Rapid RVP Result: NotDetec (03.18.22 @ 09:23)    Culture - Blood (03.17.22 @ 14:38)    Specimen Source: .Blood Blood    Culture Results:   No growth to date.    Culture - Blood (03.17.22 @ 14:37)    Specimen Source: .Blood Blood    Culture Results:   No growth to date.          RECENT CULTURES:  03-17 @ 14:38  .Blood Blood  --  --  --    No growth to date.  --  03-17 @ 14:37  .Blood Blood  --  --  --    No growth to date.  --      RADIOLOGY & ADDITIONAL STUDIES:    < from: CT Abdomen and Pelvis No Cont (03.17.22 @ 20:52) >  IMPRESSION:  Right lower lobe mucus plugging and lung opacities, possibly infectious.  Percutaneous cholecystostomy tube in place.    < end of copied text >

## 2022-03-19 NOTE — PROGRESS NOTE ADULT - SUBJECTIVE AND OBJECTIVE BOX
Subjective: Seen and examined. Patient resting in bed, unresponsive.     Medications:  ascorbic acid 500 milliGRAM(s) Oral daily  chlorhexidine 2% Cloths 1 Application(s) Topical <User Schedule>  cholecalciferol 2000 Unit(s) Oral daily  dextrose 5%. 1000 milliLiter(s) IV Continuous <Continuous>  ibuprofen  Suspension. 400 milliGRAM(s) Oral every 6 hours PRN  insulin lispro (ADMELOG) corrective regimen sliding scale   SubCutaneous every 6 hours  meropenem  IVPB 1000 milliGRAM(s) IV Intermittent every 12 hours  metoprolol tartrate 50 milliGRAM(s) Oral two times a day  mexiletine 150 milliGRAM(s) Oral every 8 hours  multivitamin 1 Tablet(s) Oral daily  pantoprazole  Injectable 40 milliGRAM(s) IV Push two times a day  polyethylene glycol 3350 17 Gram(s) Oral daily PRN  senna 2 Tablet(s) Oral at bedtime  sertraline 100 milliGRAM(s) Oral daily  thiamine 100 milliGRAM(s) Oral daily  traMADol 25 milliGRAM(s) Oral once  vancomycin  IVPB 500 milliGRAM(s) IV Intermittent every 12 hours      Physical Exam:    Vitals:  Vital Signs Last 24 Hours  T(C): 37 (22 @ 08:00), Max: 37 (22 @ 08:00)  HR: 86 (22 @ 08:00) (86 - 112)  BP: --  RR: 22 (22 @ 08:00) (20 - 22)  SpO2: 100% (22 @ 08:00) (99% - 100%)    Weight in k.4 ( @ 08:04)    I&O's Summary    18 Mar 2022 07:01  -  19 Mar 2022 07:00  --------------------------------------------------------  IN: 700 mL / OUT: 1200 mL / NET: -500 mL      General: No distress. Unresponsive.  HEENT: EOM intact.  Neck: Neck supple. JVP not elevated. No masses  Chest: Clear to auscultation bilaterally  CV: Normal S1 and S2. No murmurs, rub, or gallops. Radial pulses normal.  Abdomen: Soft, non-distended, non-tender  Skin: No rashes or skin breakdown  Neurology: Unresponsive      LVAD Interrogation: No events. No changes made.       Labs:                        7.1    15.34 )-----------( 115      ( 19 Mar 2022 07:05 )             22.4     03-    144  |  110<H>  |  52<H>  ----------------------------<  115<H>  4.1   |  26  |  1.05    Ca    9.8      19 Mar 2022 06:11  Phos  2.4       Mg     2.2         TPro  9.5<H>  /  Alb  3.1<L>  /  TBili  0.6  /  DBili  x   /  AST  83<H>  /  ALT  85<H>  /  AlkPhos  242<H>        CARDIAC MARKERS ( 17 Mar 2022 17:20 )  x     / x     / 46 U/L / x     / 1.5 ng/mL      Creatine Kinase, Serum: 46 U/L (22 @ 17:20)  Creatine Kinase, Serum: 46 U/L (22 @ 17:20)      Lactate Dehydrogenase, Serum: 188 U/L ( @ 18:29)

## 2022-03-19 NOTE — PROGRESS NOTE ADULT - SUBJECTIVE AND OBJECTIVE BOX
RICKY JOINT  MRN#: [id number]  Subjective: pulmonary progress note  : acute hypercapnic respiratory failure . Septic shock , service rendered on 2002   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 s/p HM2 LVAD in 2017 at  (due to severe PAD) with TV ring, multiple GI bleeds, thus maintained off AC, CKD 3prior COVID-19 infection in 2020, recurrent syncope post LVAD s/p ILR, s/p prolonged complex hospitalization from -2021 initially admitted with abdominal pain complicated by GIB, respiratory failure s/p trach, multiple infections, s/p cholecystotomy tube and SMA stent 10/2021, ultimately discharged to [facility] rehab and then returned to [facility] for trach decannulation , readmitted in 2022 with recurrent COVID-19 infection, initially in distributive shock. Blood cultures were also positive for serratia marcescens which he has had in the past. seen by heart failure team patient is DNR/DNI  continue on 3 liter nasal canula flat Affect , hyperkalemia lokelma , runs of V-Tach. more stable  very weak , non verbal non communicative on tube feeding , leg booting, CT scan of chest mucous bulging  on Antibiotic and incentive spirometry  , patient receiving one unit of blood transfusion           (2022 22:20)    PAST MEDICAL & SURGICAL HISTORY:  CHF (congestive heart failure)    CAD (coronary artery disease)  Depression    Pleural effusion    History of 2019 novel coronavirus disease (COVID-19)  2020    Hemorrhoids    Bleeding hemorrhoids    Peripheral arterial disease    Claudication    BPH with urinary obstruction    ACC/AHA stage D systolic heart failure    Anticoagulation goal of INR 2.0 to 2.5    Falls    Clavicle fracture    CKD (chronic kidney disease), stage III    Iron deficiency anemia    H/O epistaxis    Vertigo    GI bleed    S/P TVR (tricuspid valve repair)    S/P ventricular assist device    S/P endoscopy    H/O tracheostomy        FAMILY HISTORY:    Social History:    Marital Status:  X    (   ) Single    (   )    (  )   Occupation: Retired   Lives with: (  ) alone  (  ) children  X spouse   (  ) parents  (  ) other    Substance Use (street drugs): X never used  (  ) other:  Tobacco Usage: X never smoked   (   ) former smoker   (   ) current smoker  (     ) pack year  (    ) last cigarette date  Alcohol Usage: Social         OBJECTIVE:  ICU Vital Signs Last 24 Hrs  T(C): 37.2 (01 Mar 2022 16:00), Max: 38.4 (01 Mar 2022 09:00)  T(F): 99 (01 Mar 2022 16:00), Max: 101.1 (01 Mar 2022 09:00)  HR: 109 (01 Mar 2022 18:00) (67 - 124)  BP: --  BP(mean): 86 (2022 20:00) (86 - 86)  ABP: 89/74 (01 Mar 2022 18:00) (75/63 - 160/85)  ABP(mean): 81 (01 Mar 2022 18:00) (68 - 139)  RR: 38 (01 Mar 2022 18:00) (5 - 40)  SpO2: 99% (01 Mar 2022 18:00) (94% - 100%)       @ 07:01   @ 07:00  --------------------------------------------------------  IN: 1060.7 mL / OUT: 1600 mL / NET: -539.3 mL     @ 07: @ 18:22  --------------------------------------------------------  IN: 677.8 mL / OUT: 455 mL / NET: 222.8 mL    PHYSICAL EXAM :Daily Height in cm: 177.8 (2022 21:20)  Elderly frail male on 3 liter nasal cannula    Daily Weight in k.7 (2022 21:20)  HEENT:     + NCAT  + EOMI  - Conjuctival edema   - Icterus   - Thrush   - ETT  - NGT/OGT  Neck:         + FROM RT IJ  JVD     - Nodes     - Masses    + Mid-line trachea   - Tracheostomy  Chest:           mild kyphosis   Lungs:          + CTA  + Rhonchi  + Rales    - Wheezing  + Decreased BS   - Dullness R L  Cardiac:       + S1 + S2    + RRR   - Irregular   - S3  - S4    - Murmurs   - Rub   - Hamman’s sign   Abdomen:    + BS     + Soft    + Non-tender  - Distended  - Organomegaly  - PEG Cholecystostomy tube in place   Extremities:   - Cyanosis U/L   - Clubbing  U/L  - LE/UE Edema   + Capillary refill    + Pulses   Neuro:        + Awake   +  Alert   - Confused   - Lethargic   - Sedated   - Generalized Weakness  Skin:        - Rashes    - Erythema   + Normal incisions   + IV sites intact  -      HOSPITAL MEDICATIONS: All mediciations reviewed and analyzed  MEDICATIONS  (STANDING):  albumin human  5% IVPB 250 milliLiter(s) IV Intermittent once  cefepime   IVPB 2000 milliGRAM(s) IV Intermittent every 12 hours  chlorhexidine 2% Cloths 1 Application(s) Topical <User Schedule>  cholecalciferol 1000 Unit(s) Oral daily  dextrose 40% Gel 15 Gram(s) Oral once  dextrose 50% Injectable 25 Gram(s) IV Push once  gabapentin Solution 100 milliGRAM(s) Oral three times a day  glucagon  Injectable 1 milliGRAM(s) IntraMuscular once  insulin lispro (ADMELOG) corrective regimen sliding scale   SubCutaneous every 6 hours  magnesium sulfate  IVPB 1 Gram(s) IV Intermittent once  methimazole 10 milliGRAM(s) Oral daily  metoclopramide 10 milliGRAM(s) Oral four times a day  mexiletine 150 milliGRAM(s) Oral every 8 hours  mirtazapine 7.5 milliGRAM(s) Oral at bedtime  multivitamin 1 Tablet(s) Oral daily  pantoprazole  Injectable 40 milliGRAM(s) IV Push daily  potassium phosphate IVPB 15 milliMole(s) IV Intermittent once  senna 2 Tablet(s) Oral at bedtime  sertraline 100 milliGRAM(s) Oral daily  vancomycin  IVPB 750 milliGRAM(s) IV Intermittent every 24 hours  vasopressin Infusion 0.02 Unit(s)/Min (1.2 mL/Hr) IV Continuous <Continuous>    MEDICATIONS  (PRN):    LABS: All Lab data reviewed and analyzed                         7.1    15.34 )-----------( 115      ( 19 Mar 2022 07:05 )             22.4   03-19    144  |  110<H>  |  52<H>  ----------------------------<  115<H>  4.1   |  26  |  1.05    Ca    9.8      19 Mar 2022 06:11  Phos  2.4     -19  Mg     2.2     -    TPro  9.5<H>  /  Alb  3.1<L>  /  TBili  0.6  /  DBili  x   /  AST  83<H>  /  ALT  85<H>  /  AlkPhos  242<H>      Ca    9.9      16 Mar 2022 06:37  Phos  2.7     03-15  Mg     1.9     -    TPro  9.4<H>  /  Alb  3.2<L>  /  TBili  0.4  /  DBili  x   /  AST  35  /  ALT  24  /  AlkPhos  207<H>               PTT - ( 01 Mar 2022 00:08 )  PTT:25.0 sec LIVER FUNCTIONS - ( 01 Mar 2022 17:38 )  Alb: 2.9 g/dL / Pro: 7.3 g/dL / ALK PHOS: 116 U/L / ALT: 30 U/L / AST: 29 U/L / GGT: x           RADIOLOGY: - Reviewed and analyzed

## 2022-03-19 NOTE — PROGRESS NOTE ADULT - ASSESSMENT
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    S/p prolonged complex hospitalization from 6/14-11/16/2021 initially admitted with abdominal pain complicated by GIB, respiratory failure s/p trach, multiple infections, s/p cholecystotomy tube and SMA stent 10/2021, ultimately discharged to [facility] rehab and then returned to [facility] for trach decannulation 12/2. The patient had a recent admission with COVID 19 reinfection and distributive shock. That admission he had blood culture positive for serratia marcescens (discharged on levaquin).  He was treated with MAB, remdesivir, and steroids. He had recurrent VT and was started mexiletine.     Now coming in from [facility] ED with leukocytosis and AMS was treated for UTI. Initially required bipap but was weaned off here. Labs concerning here for WBC 26, hemoglobin of 7s then 6.7, creatinine of 1.3. His maps were initially in the 50s. Physical exam showing pus from stoma, sat of central access of 81.8, and tachycardia are all concerning for septic shock picture. He is s/p 1L fluids, 1uPRBCs, was placed on vaso. Patient has known episodes of VT and had recurrent NSVT/VT when he first arrived started on lidocaine infusion with improvement. Of note he was previously on mexiletine 150 TID outpatient, metoprolol ER 25 qAM and 50 qPM. Previous history of thyrotoxicosis on amiodarone.    He was found to be bacteremic with recurrent serratia, on IV abx possibly r/t LVAD infection vs splenic abscess noted on CT 1/19. Afebrile since 3/1. Vasopressin weaned off 3/4, started on midodrine which has now been off since 3/8. LVAD without s/s of pump malfunction. Currently he opens his eyes however remains nonverbal and not following commands. His mental status is still not back to baseline. His VT has now subsided, last episode was noted on 3/12.  His hyponatremia and hyperkalemia are overall improving after receiving the following treatment insulin/D50 and lokelma.  Recently his hemoglobin has continued to drop, will be transfused with 1 uPRBC on 3/16. Overall fairly stable from HF perspective. 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 After family discussion on 3/17, family wishes to continues course of treatment and are wanting to proceed with PEG placement, will need to discuss date with GI.

## 2022-03-19 NOTE — PROGRESS NOTE ADULT - ASSESSMENT
66 years old male with PMHx of Stage D HF 2/2 NICM s/p HM2 LVAD in 9/2017 at  (due to severe PAD) with TV ring, multiple GI bleeds, no AC, CKD 3 prior COVID-19 infection in 4/2020, chronic cholecystitis s/p percutaneous cholecystostomy tube, recurrent COVID infection, Serratia bacteremia transferred from [facility] for sepsis.    ID is consulted for septic shock  Recently had Serratia bacteremia discharged on suppressive PO Levaquin  Returned with leukocytosis, fever, AMS, hypotension requiring pressors  ?purulent sputum coming out from previous trach site  CXR no PNA  CTH unrevealing  LVAD exit sites shows no signs of infection  Perc dawn tube site shows no signs of infection  BCx Serratia, S cefepime and ertapenem, R quinolones  CT showed abdominal aortic thrombus and a 2.6cm splenic lesion    Antibiotics:  Vancomycin 2/28 - 3/1  Zosyn 2/28  Cefepime 2/28 -     Currently unclear etiology of septic shock likely secondary to recurrent serratia bacteremia  Source of serratia is could be LVAD, infected aortic thrombus or splenic lesion  Regardless will treat this as in intravascular infection so likely 4 - 6 weeks in total    IMPRESSION:  Septic shock 2/2 serratia bacteremia  Aortic thrombus  Splenic lesion  Leukocytosis  Fever  LVAD      RECOMMENDATIONS:  Patient with new fevers, tachycardia, tachypnea    differential aspiration event in the setting of NG feeds, bacteremia secondary to a line source, COVID acquisition intra-abdominal process    Sent blood cultures x2 sets 3/17 with NGTD  CT of chest suggestive of mucus plugging with possible aspiration pneumonia event  continue Vancomycin 15mg/kg q 12hr plus meropenem 1 gram IV q 8hr   Vancomycin trough therapeutic at 14.3  If blood cultures are finalized from 3/17 as negative would discontinue the Vancomycin and complete a course of Meorpenem x7days prior to de-escalate back to cover prior serratia bacteremia /driveline infection    Please hold off on PEG placement        Morris Prater MD  Can be called via Teams  After 5pm/weekends [phone]

## 2022-03-19 NOTE — PROGRESS NOTE ADULT - PROBLEM SELECTOR PLAN 1
Suspect secondary to infection with diffuse cerebral dysfunction.   Per family meeting 3/17 continue treatment and obtain PEG when able  They are reportedly aware of poor prognosis and functional quadriplegia  DNR DNI

## 2022-03-19 NOTE — PROVIDER CONTACT NOTE (OTHER) - ACTION/TREATMENT ORDERED:
Draw CBC with AM labs. Acknowledged patient's current condition and patient also on fluids Dextrose 5%. Fluid shift concerns. Continue to monitor patient.

## 2022-03-19 NOTE — PROGRESS NOTE ADULT - PROBLEM SELECTOR PLAN 3
Initially treated for serratia bacteremia with cefepime- source likely LVAD  CT showed abdominal aortic thrombus (?infected) and a 2.6cm splenic lesion (?abscess)  Now suspect RLL pneumonia- ID following - abx currently vancomycin (3/17 - ), meropenem (3/17 - )

## 2022-03-19 NOTE — PROGRESS NOTE ADULT - ASSESSMENT
Assessment and Recommendation:   · Assessment	  Assessment and recommendation :  Acute hypercapnic respiratory Failure on 3 liter on nasal cannula    S/P Septic shock   Bacteremia with serratia marcescens  mucous plug on antibiotic hold on PEG   Severe anemia with low H/H   S/P transfuse another  unit of Packed RBCs   Severe ischemic cardiomyopathy   ACC/AHA stage D systolic heart failure  severe ischemic cardiomyopathy EF 10%  Peripheral vascular Disease   PAF on no ACO   H/O of repeated GI bleeding   S/P mesenteric ischemia   GERD on Reglan   S/P HM2 LVAD , VT on Mexiletine   hyperthyroidism on  methimazole   severe protein caloric malnutrition   severe neuropathy   Major depression   S/P tracheostomy X2  Continue Antibiotic cefepime   NG tube feeding   GI prophylaxis   patient is DNR/DNI

## 2022-03-19 NOTE — PROGRESS NOTE ADULT - PROBLEM SELECTOR PLAN 9
- currently being fed via keotube  - at this time patient is unable to tolerate PO diet. Family wishes to proceed with PEG placement  - GI following, will need to determine date of PEG placement (of note will need to be booked with cardiac anesthesia)  - No absolute contra indication to placing a PEG from an cardiac perspective, but will need to wait for stablization from ID and GI bleeding perspective

## 2022-03-20 NOTE — PROGRESS NOTE ADULT - PROBLEM SELECTOR PLAN 3
Initially treated for serratia bacteremia with cefepime- source likely LVAD  CT showed abdominal aortic thrombus (?infected) and a 2.6cm splenic lesion (?abscess)  Now suspect RLL pneumonia- ID following - abx currently vancomycin (3/17 - ), meropenem (3/17 - )  If blood cultures are finalized from 3/17 as negative will discontinue Vancomycin and complete a course of Meropenem x 7 days to de-escalate back to cover prior serratia bacteremia /driveline infection

## 2022-03-20 NOTE — PROGRESS NOTE ADULT - ASSESSMENT
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 s/p HM2 LVAD in 9/2017 at  (due to severe PAD) with TV ring, multiple GI bleeds, thus maintained off AC, prior COVID-19 infection in 4/2020, recurrent syncope post LVAD s/p ILR, prolonged complex hospitalization from 6/14-11/16/2021 with GIB, respiratory failure s/p trach decanulated 12/2021, multiple infections, s/p cholecystotomy tube and SMA stent 10/2021, recurrent COVID 19 reinfection s/p MAB/remdesivir/steroids and distributive shock with serratia bacteremia, recurrent VT on mexiletine transferred from [facility] ED with metabolic encephalopathy, hypercarbic respiratory failure weaned off bipap, septic shock requiring pressors (now on midodrine) with serratia bacteremia on cefepime, intermittent h/h drop s/p total 3units prbc (last 3/6), episodes of recurrent VT s/p lidocaine gtt now back on mexiletine.

## 2022-03-20 NOTE — PROGRESS NOTE ADULT - SUBJECTIVE AND OBJECTIVE BOX
Mich Krueger MD    Patient is a 66y old  Male who presents with a chief complaint of Septic shock (19 Mar 2022 14:31)        SUBJECTIVE / OVERNIGHT EVENTS: No new events  TELEMETRY: SR       MEDICATIONS  (STANDING):  ascorbic acid 500 milliGRAM(s) Oral daily  chlorhexidine 2% Cloths 1 Application(s) Topical <User Schedule>  cholecalciferol 2000 Unit(s) Oral daily  dextrose 5%. 1000 milliLiter(s) (50 mL/Hr) IV Continuous <Continuous>  insulin lispro (ADMELOG) corrective regimen sliding scale   SubCutaneous every 6 hours  meropenem  IVPB 1000 milliGRAM(s) IV Intermittent every 12 hours  metoprolol tartrate 50 milliGRAM(s) Oral two times a day  mexiletine 150 milliGRAM(s) Oral every 8 hours  multivitamin 1 Tablet(s) Oral daily  pantoprazole  Injectable 40 milliGRAM(s) IV Push two times a day  senna 2 Tablet(s) Oral at bedtime  sertraline 100 milliGRAM(s) Oral daily  thiamine 100 milliGRAM(s) Oral daily  traMADol 25 milliGRAM(s) Oral once  vancomycin  IVPB 500 milliGRAM(s) IV Intermittent every 12 hours    MEDICATIONS  (PRN):  polyethylene glycol 3350 17 Gram(s) Oral daily PRN Constipation      Vital Signs Last 24 Hrs  T(C): 36.3 (20 Mar 2022 08:00), Max: 37 (19 Mar 2022 20:15)  T(F): 97.4 (20 Mar 2022 08:00), Max: 98.6 (19 Mar 2022 20:15)  HR: 62 (20 Mar 2022 08:00) (62 - 86)  BP: --  BP(mean): 82 (20 Mar 2022 08:00) (80 - 84)  RR: 16 (20 Mar 2022 08:00) (16 - 22)  SpO2: 100% (20 Mar 2022 08:00) (100% - 100%)  CAPILLARY BLOOD GLUCOSE      POCT Blood Glucose.: 90 mg/dL (20 Mar 2022 04:19)  POCT Blood Glucose.: 96 mg/dL (20 Mar 2022 00:26)  POCT Blood Glucose.: 111 mg/dL (19 Mar 2022 16:42)  POCT Blood Glucose.: 109 mg/dL (19 Mar 2022 11:53)    I&O's Summary    19 Mar 2022 07:01  -  20 Mar 2022 07:00  --------------------------------------------------------  IN: 600 mL / OUT: 800 mL / NET: -200 mL          PHYSICAL EXAM  GENERAL: NAD, cachetic  HEAD:  Atraumatic, normocephalic; +NGT  EYES: EOMI, PERRLA, conjunctiva and sclera clear  CHEST/LUNG: Clear to auscultation bilaterally; no wheezes  HEART: +LVAD hum  ABDOMEN: Soft, nontender, nondistended; bowel sounds present; R sided driveline with clean dressing in place; R percut dawn drain with bilious drainage  EXTREMITIES:  2+ peripheral pulses; no clubbing, cyanosis, or edema  PSYCH: AAOx0-1, opens eyes to name    LABS:                        7.9    8.81  )-----------( 107      ( 20 Mar 2022 06:39 )             25.0     03-20    139  |  103  |  34<H>  ----------------------------<  79  3.7   |  24  |  0.86    Ca    9.7      20 Mar 2022 06:39  Phos  2.4     03-19  Mg     1.9     03-20    TPro  8.9<H>  /  Alb  3.1<L>  /  TBili  0.7  /  DBili  x   /  AST  49<H>  /  ALT  58<H>  /  AlkPhos  206<H>  03-20              Culture - Blood (collected 17 Mar 2022 14:38)  Source: .Blood Blood  Preliminary Report (18 Mar 2022 15:01):    No growth to date.    Culture - Blood (collected 17 Mar 2022 14:37)  Source: .Blood Blood  Preliminary Report (18 Mar 2022 15:01):    No growth to date.        RADIOLOGY & ADDITIONAL TESTS:    Imaging Personally Reviewed:  Consultant(s) Notes Reviewed:    Care Discussed with Consultants/Other Providers:

## 2022-03-20 NOTE — PROGRESS NOTE ADULT - PROBLEM SELECTOR PLAN 1
Suspect secondary to infection with diffuse cerebral dysfunction.   Per family meeting 3/17 continue treatment and obtain PEG when able  They are reportedly aware of poor prognosis and functional quadriplegia  DNR/DNI

## 2022-03-20 NOTE — PROGRESS NOTE ADULT - PROBLEM SELECTOR PLAN 5
Intermittent H/H drops requiring transfusions  Transfused 1 unit PRBCs 3/19  No s/s of active GI bleed  Daily CBC

## 2022-03-20 NOTE — PROGRESS NOTE ADULT - ASSESSMENT
Assessment and Recommendation:   · Assessment	  Assessment and recommendation :  Acute hypercapnic respiratory Failure on 3 liter on nasal cannula    S/P Septic shock   Bacteremia with serratia marcescens  mucous plug on antibiotic hold on PEG   Severe anemia with low H/H   S/P transfuse another  unit of Packed RBCs   Severe ischemic cardiomyopathy   ACC/AHA stage D systolic heart failure  severe ischemic cardiomyopathy EF 10%  Peripheral vascular Disease   PAF on no ACO   One RT LL opacity improved will clear for PEG placement   H/O of repeated GI bleeding   S/P mesenteric ischemia   GERD on Reglan   S/P HM2 LVAD , VT on Mexiletine   hyperthyroidism on  methimazole   severe protein caloric malnutrition   severe neuropathy   Major depression   S/P tracheostomy X2  Continue Antibiotic cefepime   NG tube feeding   GI prophylaxis   patient is DNR/DNI

## 2022-03-20 NOTE — PROGRESS NOTE ADULT - SUBJECTIVE AND OBJECTIVE BOX
RICKY JOINT  MRN#: [id number]  Subjective: pulmonary progress note  : acute hypercapnic respiratory failure . Septic shock , service rendered on 2002   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 s/p HM2 LVAD in 2017 at  (due to severe PAD) with TV ring, multiple GI bleeds, thus maintained off AC, CKD 3prior COVID-19 infection in 2020, recurrent syncope post LVAD s/p ILR, s/p prolonged complex hospitalization from -2021 initially admitted with abdominal pain complicated by GIB, respiratory failure s/p trach, multiple infections, s/p cholecystotomy tube and SMA stent 10/2021, ultimately discharged to [facility] rehab and then returned to [facility] for trach decannulation , readmitted in 2022 with recurrent COVID-19 infection, initially in distributive shock. Blood cultures were also positive for serratia marcescens which he has had in the past. seen by heart failure team patient is DNR/DNI  continue on 3 liter nasal canula flat Affect , hyperkalemia lokelma , runs of V-Tach. more stable  very weak , non verbal non communicative on tube feeding , leg booting, CT scan of chest mucous bulging  on Antibiotic and incentive spirometry  , patient receiving one unit of blood transfusion , sleeping at round .RT opacity infiltrate on Antibiotic           (2022 22:20)    PAST MEDICAL & SURGICAL HISTORY:  CHF (congestive heart failure)    CAD (coronary artery disease)  Depression    Pleural effusion    History of 2019 novel coronavirus disease (COVID-19)  2020    Hemorrhoids    Bleeding hemorrhoids    Peripheral arterial disease    Claudication    BPH with urinary obstruction    ACC/AHA stage D systolic heart failure    Anticoagulation goal of INR 2.0 to 2.5    Falls    Clavicle fracture    CKD (chronic kidney disease), stage III    Iron deficiency anemia    H/O epistaxis    Vertigo    GI bleed    S/P TVR (tricuspid valve repair)    S/P ventricular assist device    S/P endoscopy    H/O tracheostomy        FAMILY HISTORY:    Social History:    Marital Status:  X    (   ) Single    (   )    (  )   Occupation: Retired   Lives with: (  ) alone  (  ) children  X spouse   (  ) parents  (  ) other    Substance Use (street drugs): X never used  (  ) other:  Tobacco Usage: X never smoked   (   ) former smoker   (   ) current smoker  (     ) pack year  (    ) last cigarette date  Alcohol Usage: Social         OBJECTIVE:  ICU Vital Signs Last 24 Hrs  T(C): 37.2 (01 Mar 2022 16:00), Max: 38.4 (01 Mar 2022 09:00)  T(F): 99 (01 Mar 2022 16:00), Max: 101.1 (01 Mar 2022 09:00)  HR: 109 (01 Mar 2022 18:00) (67 - 124)  BP: --  BP(mean): 86 (2022 20:00) (86 - 86)  ABP: 89/74 (01 Mar 2022 18:00) (75/63 - 160/85)  ABP(mean): 81 (01 Mar 2022 18:00) (68 - 139)  RR: 38 (01 Mar 2022 18:00) (5 - 40)  SpO2: 99% (01 Mar 2022 18:00) (94% - 100%)       @ 07: @ 07:00  --------------------------------------------------------  IN: 1060.7 mL / OUT: 1600 mL / NET: -539.3 mL     @ 07: @ 18:22  --------------------------------------------------------  IN: 677.8 mL / OUT: 455 mL / NET: 222.8 mL    PHYSICAL EXAM :Daily Height in cm: 177.8 (2022 21:20)  Elderly frail male on 3 liter nasal cannula    Daily Weight in k.7 (2022 21:20)  HEENT:     + NCAT  + EOMI  - Conjuctival edema   - Icterus   - Thrush   - ETT  - NGT/OGT  Neck:         + FROM RT IJ  JVD     - Nodes     - Masses    + Mid-line trachea   - Tracheostomy  Chest:           mild kyphosis   Lungs:          + CTA  + Rhonchi  + Rales    - Wheezing  + Decreased BS   - Dullness R L  Cardiac:       + S1 + S2    + RRR   - Irregular   - S3  - S4    - Murmurs   - Rub   - Hamman’s sign   Abdomen:    + BS     + Soft    + Non-tender  - Distended  - Organomegaly  - PEG Cholecystostomy tube in place   Extremities:   - Cyanosis U/L   - Clubbing  U/L  - LE/UE Edema   + Capillary refill    + Pulses   Neuro:        + Awake   +  Alert   - Confused   - Lethargic   - Sedated   - Generalized Weakness  Skin:        - Rashes    - Erythema   + Normal incisions   + IV sites intact  -      HOSPITAL MEDICATIONS: All mediciations reviewed and analyzed  MEDICATIONS  (STANDING):  albumin human  5% IVPB 250 milliLiter(s) IV Intermittent once  cefepime   IVPB 2000 milliGRAM(s) IV Intermittent every 12 hours  chlorhexidine 2% Cloths 1 Application(s) Topical <User Schedule>  cholecalciferol 1000 Unit(s) Oral daily  dextrose 40% Gel 15 Gram(s) Oral once  dextrose 50% Injectable 25 Gram(s) IV Push once  gabapentin Solution 100 milliGRAM(s) Oral three times a day  glucagon  Injectable 1 milliGRAM(s) IntraMuscular once  insulin lispro (ADMELOG) corrective regimen sliding scale   SubCutaneous every 6 hours  magnesium sulfate  IVPB 1 Gram(s) IV Intermittent once  methimazole 10 milliGRAM(s) Oral daily  metoclopramide 10 milliGRAM(s) Oral four times a day  mexiletine 150 milliGRAM(s) Oral every 8 hours  mirtazapine 7.5 milliGRAM(s) Oral at bedtime  multivitamin 1 Tablet(s) Oral daily  pantoprazole  Injectable 40 milliGRAM(s) IV Push daily  potassium phosphate IVPB 15 milliMole(s) IV Intermittent once  senna 2 Tablet(s) Oral at bedtime  sertraline 100 milliGRAM(s) Oral daily  vancomycin  IVPB 750 milliGRAM(s) IV Intermittent every 24 hours  vasopressin Infusion 0.02 Unit(s)/Min (1.2 mL/Hr) IV Continuous <Continuous>    MEDICATIONS  (PRN):    LABS: All Lab data reviewed and analyzed                         7.1    15.34 )-----------( 115      ( 19 Mar 2022 07:05 )             22.4   03-    144  |  110<H>  |  52<H>  ----------------------------<  115<H>  4.1   |  26  |  1.05    Ca    9.8      19 Mar 2022 06:11  Phos  2.4     03-19  Mg     2.2     -    TPro  9.5<H>  /  Alb  3.1<L>  /  TBili  0.6  /  DBili  x   /  AST  83<H>  /  ALT  85<H>  /  AlkPhos  242<H>      Ca    9.9      16 Mar 2022 06:37  Phos  2.7     03-15  Mg     1.9     -    TPro  9.4<H>  /  Alb  3.2<L>  /  TBili  0.4  /  DBili  x   /  AST  35  /  ALT  24  /  AlkPhos  207<H>               PTT - ( 01 Mar 2022 00:08 )  PTT:25.0 sec LIVER FUNCTIONS - ( 01 Mar 2022 17:38 )  Alb: 2.9 g/dL / Pro: 7.3 g/dL / ALK PHOS: 116 U/L / ALT: 30 U/L / AST: 29 U/L / GGT: x           RADIOLOGY: - Reviewed and analyzed

## 2022-03-20 NOTE — CHART NOTE - NSCHARTNOTEFT_GEN_A_CORE
Labs reviewed and Serum creatinine has returned to baseline and hypernatremia has resolved   Nephrology will sign off at this time   Please call us back if any questions     If any questions, please feel free to contact me     Gary Webb  Nephrology Fellow  [facility] Pager: [phone]

## 2022-03-20 NOTE — PROGRESS NOTE ADULT - PROBLEM SELECTOR PLAN 2
Previously hyponatremic on salt tabs, now d/c'd  Improved with D5W and NGT free water boluses  Daily BMP

## 2022-03-21 NOTE — PROGRESS NOTE ADULT - SUBJECTIVE AND OBJECTIVE BOX
Patient is a 66y old  Male who presents with a chief complaint of Septic shock (21 Mar 2022 12:37)       INTERVAL HPI/OVERNIGHT EVENTS:  Patient seen and evaluated at bedside.  Pt is resting comfortable in NAD.     Allergies    Amiodarone Hydrochloride (Other (Severe))    Intolerances        Vital Signs Last 24 Hrs  T(C): 36.7 (21 Mar 2022 22:00), Max: 36.7 (21 Mar 2022 22:00)  T(F): 98 (21 Mar 2022 22:00), Max: 98 (21 Mar 2022 22:00)  HR: 93 (21 Mar 2022 22:00) (81 - 100)  BP: 102/73 (21 Mar 2022 22:00) (94/68 - 102/73)  BP(mean): 84 (21 Mar 2022 22:05) (73 - 84)  RR: 18 (21 Mar 2022 22:00) (16 - 18)  SpO2: 100% (21 Mar 2022 22:00) (100% - 100%)    LABS:                        8.7    6.42  )-----------( 108      ( 21 Mar 2022 06:29 )             27.5     03-21    133<L>  |  100  |  24<H>  ----------------------------<  95  3.8   |  22  |  0.76    Ca    9.4      21 Mar 2022 06:29  Phos  2.2     03-21  Mg     1.7     03-21    TPro  9.0<H>  /  Alb  2.9<L>  /  TBili  0.5  /  DBili  0.1  /  AST  101<H>  /  ALT  68<H>  /  AlkPhos  315<H>  03-21        CAPILLARY BLOOD GLUCOSE      POCT Blood Glucose.: 122 mg/dL (21 Mar 2022 23:48)  POCT Blood Glucose.: 120 mg/dL (21 Mar 2022 22:04)  POCT Blood Glucose.: 118 mg/dL (21 Mar 2022 16:48)  POCT Blood Glucose.: 115 mg/dL (21 Mar 2022 12:08)  POCT Blood Glucose.: 123 mg/dL (21 Mar 2022 06:02)      Lower Extremity Physical Exam:  Vasular: DP/PT 0/4, B/L, CFT <3 seconds B/L, Temperature gradient wnl, B/L.   Neuro: Epicritic sensation intact to the level of foot, B/L.  Musculoskeletal/Ortho: painful contracted hammertoes 2-5 bilat  Skin: right heel DTI present on admission

## 2022-03-21 NOTE — PROGRESS NOTE ADULT - ATTENDING COMMENTS
Clinical condition remains tenuous.   Recurrent hospitalizations with bacteremia, aspiration PNA, anemia, AMS and generalized debilitation. 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    Pt remains nonverbal.   Family would like everything done. Pending ID clearance for PEG tube placement.   Poor prognosis.

## 2022-03-21 NOTE — PROGRESS NOTE ADULT - SUBJECTIVE AND OBJECTIVE BOX
Wound Surgery Progress Note:    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 s/p HM2 LVAD in 9/2017 at  (due to severe PAD) with TV ring, multiple GI bleeds, thus maintained off AC, CKD 3prior COVID-19 infection in 4/2020, recurrent syncope post LVAD s/p ILR, s/p prolonged complex hospitalization from 6/14-11/16/2021 initially admitted with abdominal pain complicated by GIB, respiratory failure s/p trach, multiple infections, s/p cholecystotomy tube and SMA stent 10/2021, ultimately discharged to [facility] rehab and then returned to [facility] for trach decannulation 12/2, readmitted in 2/2022 with recurrent COVID-19 infection, initially in distributive shock. Blood cultures were also positive for serratia marcescens which he has had in the past.     Pt was transferred to [facility] CICU from [facility]. He was sent to their ED from a rehab center due to AMS and tachycardia. At [facility], they noted a WBC greater than 20, tachycardia and hypercarbia on ABG. Pt was placed on BiPAP and transferred to [facility] CICU for concern for sepsis vs septic shock.  (27 Feb 2022 22:20)    Request for reevaluation of sacral/bilateral buttocks wound received from nursing. Mr. Quispe was encountered on an alternating air with low air loss surface. He was nonverbal but cooperative with exam. He was not able to move or reposition self in bed. Sacral/buttocks wound superficially eroded only at this time. Will continue current management.    PAST MEDICAL & SURGICAL HISTORY:  CHF (congestive heart failure)  CAD (coronary artery disease)  Depression  Pleural effusion  History of 2019 novel coronavirus disease (COVID-19), april 2020  Hemorrhoids  Bleeding hemorrhoids  Peripheral arterial disease  Claudication  BPH with urinary obstruction  ACC/AHA stage D systolic heart failure  Anticoagulation goal of INR 2.0 to 2.5  Falls  Clavicle fracture  CKD (chronic kidney disease), stage III  Iron deficiency anemia  H/O epistaxis  Vertigo  GI bleed  S/P TVR (tricuspid valve repair)  S/P ventricular assist device  S/P endoscopy  H/O tracheostomy    MEDICATIONS  (STANDING):  ascorbic acid 500 milliGRAM(s) Oral daily  chlorhexidine 2% Cloths 1 Application(s) Topical <User Schedule>  cholecalciferol 2000 Unit(s) Oral daily  insulin lispro (ADMELOG) corrective regimen sliding scale   SubCutaneous every 6 hours  meropenem  IVPB 1000 milliGRAM(s) IV Intermittent every 8 hours  metoprolol tartrate 50 milliGRAM(s) Oral two times a day  mexiletine 150 milliGRAM(s) Oral every 8 hours  multivitamin 1 Tablet(s) Oral daily  pantoprazole  Injectable 40 milliGRAM(s) IV Push two times a day  senna 2 Tablet(s) Oral at bedtime  sertraline 100 milliGRAM(s) Oral daily  thiamine 100 milliGRAM(s) Oral daily  traMADol 25 milliGRAM(s) Oral once  vancomycin  IVPB 500 milliGRAM(s) IV Intermittent every 12 hours    MEDICATIONS  (PRN):  polyethylene glycol 3350 17 Gram(s) Oral daily PRN Constipation    Allergies    Amiodarone Hydrochloride (Other (Severe))    Intolerances    Vital Signs Last 24 Hrs  T(C): 36.4 (21 Mar 2022 12:17), Max: 36.6 (21 Mar 2022 00:35)  T(F): 97.6 (21 Mar 2022 12:17), Max: 97.9 (21 Mar 2022 04:35)  HR: 81 (21 Mar 2022 09:09) (65 - 100)  BP: 94/68 (21 Mar 2022 09:09) (94/68 - 103/76)  BP(mean): 76 (21 Mar 2022 09:09) (76 - 86)  RR: 16 (21 Mar 2022 09:09) (16 - 16)  SpO2: 100% (21 Mar 2022 09:09) (100% - 100%)    Physical Exam:  General: Awake, NAD   Respiratory: no SOB on supplemental O2  Cardiac: LVAD  Gastrointestinal: soft NT/ND  Neurology: nonverbal, not following commands  Musculoskeletal: no contractures  Vascular: BLE edema equal  Skin: Right buttocks with superficially eroded patches x 2 each 5cm x 6cm x 0cm, no drainage  Left buttocks with superficially denuded skin L 4cm x W 5cm x d 0.1 with scant drainage  No odor, erythema, increased warmth, tenderness, induration, fluctuance    LABS:  03-21    133<L>  |  100  |  24<H>  ----------------------------<  95  3.8   |  22  |  0.76    Ca    9.4      21 Mar 2022 06:29  Phos  2.2     03-21  Mg     1.7     03-21    TPro  9.0<H>  /  Alb  2.9<L>  /  TBili  0.5  /  DBili  0.1  /  AST  101<H>  /  ALT  68<H>  /  AlkPhos  315<H>  03-21                          8.7    6.42  )-----------( 108      ( 21 Mar 2022 06:29 )             27.5

## 2022-03-21 NOTE — PROGRESS NOTE ADULT - ASSESSMENT
Impression:    Sacral/bilateral buttocks deep tissue injuries present on admission in evolution  Right heel deep tissue injury present on admission  Left heel deep tissue injury present on admission  scrotal wound present on admission  Incontinence of bowel  Incontinence Dermatitis    Recommend:  1.) topical therapy: sacral/bilateral buttock/scrotal injuries - cleanse with incontinence cleanser, pat dry, apply Triad ointment BID and PRN for incontinent episodes  Bilateral heel injuries - cleanse with NS, pat dry, apply cavilon daily  2.) Incontinence Management - incontinence cleanser, pads, pericare BID  3.) Maintain on an alternating air with low air loss surface  4.) Turn and reposition Q 2 hours  5.) Nutrition optimization  6.) Offload heels/feet with complete cair air fluidized boots; ensure that the soles of the feet are not resting on the foot board of the bed.  7.) Wound care team Podiatrists, Luis Benitez/Kaleigh/Kelly will follow foot wounds.    Care as per medicine. Will not actively follow but will remain available. Please recall for new issues or deterioration.  Upon discharge f/u as [facility] at [facility] [address] [place] [address] [phone]  Thank you for this consult  Trisha Hodge, PATIENCE-C, CWOCN [id number]

## 2022-03-21 NOTE — PROGRESS NOTE ADULT - ASSESSMENT
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    S/p prolonged complex hospitalization from 6/14-11/16/2021 initially admitted with abdominal pain complicated by GIB, respiratory failure s/p trach, multiple infections, s/p cholecystotomy tube and SMA stent 10/2021, ultimately discharged to [facility] rehab and then returned to [facility] for trach decannulation 12/2. The patient had a recent admission with COVID 19 reinfection and distributive shock. That admission he had blood culture positive for serratia marcescens (discharged on levaquin).  He was treated with MAB, remdesivir, and steroids. He had recurrent VT and was started mexiletine.     Now coming in from [facility] ED with leukocytosis and AMS was treated for UTI. Initially required bipap but was weaned off here. Labs concerning here for WBC 26, hemoglobin of 7s then 6.7, creatinine of 1.3. His maps were initially in the 50s. Physical exam showing pus from stoma, sat of central access of 81.8, and tachycardia are all concerning for septic shock picture. He is s/p 1L fluids, 1uPRBCs, was placed on vaso. Patient has known episodes of VT and had recurrent NSVT/VT when he first arrived started on lidocaine infusion with improvement. Of note he was previously on mexiletine 150 TID outpatient, metoprolol ER 25 qAM and 50 qPM. Previous history of thyrotoxicosis on amiodarone.    He was found to be bacteremic with recurrent serratia, on IV abx possibly r/t LVAD infection vs splenic abscess noted on CT 1/19. Afebrile since 3/1. Vasopressin weaned off 3/4, started on midodrine which has now been off since 3/8. LVAD without s/s of pump malfunction. Currently he opens his eyes however remains nonverbal and not following commands. His mental status is still not back to baseline. His VT has now subsided, last episode was noted on 3/12.  His hyponatremia and hyperkalemia are overall improving after receiving the following treatment insulin/D50 and lokelma.  Recently his hemoglobin has continued to drop, will be transfused with 1 uPRBC on 3/16. Overall fairly stable from HF perspective. 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 After family discussion on 3/17, family wishes to continues course of treatment and are wanting to proceed with PEG placement, will need to discuss date with GI.

## 2022-03-21 NOTE — CONSULT NOTE ADULT - SUBJECTIVE AND OBJECTIVE BOX
Behavioral Cardiology Psychological Assessment     History of present illness: Mr. Qusipe is a 66 year-old man with history of NICM s/p HM2 LVAD in 9/2017 as DT, prior COVID-19 infection in 4/2020, s/p prolonged complex hospitalization (6/14-11/16/2021) and another readmission with COVID 19 reinfection and distributive shock. That admission he had blood culture positive for serratia marcescens (discharged on levaquin). Now coming in from [facility] ED with leukocytosis and AMS was treated for UTI. Currently he opens his eyes however remains nonverbal and not following commands. His mental status is still not back to baseline. His VT has now subsided, last episode was noted on 3/12.  His hyponatremia and hyperkalemia are overall improving after receiving the following treatment insulin/D50 and lokelma. Overall fairly stable from HF perspective. 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 After family discussion on 3/17, family wishes to continues course of treatment and are wanting to proceed with PEG placement, will need to discuss date with GI.     Social history:     Substance use:   Tobacco:   Alcohol:   Drug:     Mental status exam: Seen lying in bed on 2DSU. Eyes open. Looked at this writer when name called. Unable to assess orientation. Nonverbal. Unable to assess thought process.     Past psychiatric history: Denies     Psychological assessment: Looked at this writer when name called but did not sustain gaze. Blinked when asked if he recalled this writer. Asked Cre[place] speaking RN to verify by speaking with patient in Creole. She asked him to squeeze hand if he recalled this writer and he did so. Did not squeeze hand when asked other questions. Showed patient pictures of his native [place]. Appeared to recognize by opening his eyes wider and holding his gaze on picture. Also squeezed hand "yes" to question, "are you cold?" and "do you want a blanket?"     Impression:     Dx:     Recommendations:    Behavioral Cardiology Psychological Assessment     History of present illness: Mr. Quispe is a 66 year-old man with history of PMH non-ischemic cardiomyopathy s/p LVAD, multiple GI bleeds, CKD, Covid-19 infections, recurrent Serratia bacteriemia with persistent AMS. As per neurology, decreased LOC with non-localizing exam. Mental status has been unchanged.     Social history: , lives in his sister’s house in [place]. Has 3 sons (2 live in , 1 in [facility]). Oldest son (Kang Quispe) lives in [place]. Not close with his children. One of 3 siblings. Lives in his sister’s house in [place] (Cristina Rudolph [phone]), also in the home are niece [phone]) and nephew (Miller Rudolph).  Another sister lives close by in [place] and all other siblings live in [facility] which the family visit often. Prior to health issues worked full time at [employer] in [place], stopped working due to heart disease. Education: high school graduate.   Substance use: (Information from previous assessment)  Tobacco: Denies. Former smoker, 8-10 cigarettes/day for 30 years, quit prior to LVAD 2017.  Alcohol: None for past 6 years. Past use of 3-4 drinks of [place] 2x/week.   Drug: Denies any drug use.     Past psychiatric history: Denies     Psychological assessment: Looked at this writer when name called but did not sustain gaze. Blinked when asked if he recalled this writer. Asked Creole speaking RN to verify by speaking with patient in Creole. She asked him to squeeze hand if he recalled this writer and he did so. Did not squeeze hand when asked other questions. Showed patient pictures of his native [place]. Maintained gaze, opened his eyes wider and followed picture with gaze. Also squeezed hand "yes" to question, "are you cold?" and "do you want a blanket?" Squeezing hand to questions appears to be change from previous responses as per RN. Unable to complete full assessment due to limited communication.     Mental status exam: Seen lying in bed on 2DSU. Eyes open. Nonverbal. Looked at this writer when name called, squeezed hand to questions. Unable to complete full assessment.     Impression: Mr. Quispe is a 66-year-old man s/p LVAD (2017) readmitted on with 2/27/22 with altered mental status and UTI. Well known to this writer. Has had previous extended hospitalizations with multiple medical issues. Since this admission, remains nonverbal. Today squeezed hand when asked specific questions with assistance of Creole speaking RN. Unable to complete full assessment due to limited communication.     Dx: Adjustment disorder, unspecified F43.20;  r/o Delirium, F06;  Systolic heart failure I50.2  LVAD Z95.811    Recommendations:   Continue to engage with patient as often as possible  May benefit from Palliative consult to discuss his goals of care  Holistic RN  Behavioral Cardiology will follow      25 minutes spent on total patient encounter

## 2022-03-21 NOTE — PROGRESS NOTE ADULT - ASSESSMENT
Assessment and Recommendation:   · Assessment	  Assessment and recommendation :  Acute hypercapnic respiratory Failure on 3 liter on nasal cannula    S/P Septic shock   Bacteremia with serratia marcescens  mucous plug on antibiotic hold on PEG   Severe anemia with low H/H   S/P transfuse another  unit of Packed RBCs   Severe ischemic cardiomyopathy   ACC/AHA stage D systolic heart failure  severe ischemic cardiomyopathy EF 10%  Peripheral vascular Disease   PAF on no ACO   One RT LL opacity improved will clear for PEG placement   H/O of repeated GI bleeding   S/P mesenteric ischemia   GERD on Reglan   S/P HM2 LVAD , VT on Mexiletine   hyperthyroidism on  methimazole   severe protein caloric malnutrition   severe neuropathy   Major depression   Continue Antibiotic cefepime   NG tube feeding   GI prophylaxis   patient is DNR/DNI

## 2022-03-21 NOTE — PROGRESS NOTE ADULT - ASSESSMENT
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 s/p HM2 LVAD in 9/2017 at  (due to severe PAD) with TV ring, multiple GI bleeds, thus maintained off AC, prior COVID-19 infection in 4/2020, recurrent syncope post LVAD s/p ILR, prolonged complex hospitalization from 6/14-11/16/2021 with GIB, respiratory failure s/p trach decanulated 12/2021, multiple infections, s/p cholecystotomy tube and SMA stent 10/2021, recurrent COVID 19 reinfection s/p MAB/remdesivir/steroids and distributive shock with serratia bacteremia, recurrent VT on mexiletine transferred from [facility] ED with metabolic encephalopathy, hypercarbic respiratory failure weaned off bipap, septic shock requiring pressors (now on midodrine) with serratia bacteremia on cefepime, intermittent h/h drop s/p total 3units prbc (last 3/6), episodes of recurrent VT s/p lidocaine gtt now back on mexiletine.

## 2022-03-21 NOTE — PROGRESS NOTE ADULT - PROBLEM SELECTOR PROBLEM 1
Pt w/ urinary catheter in place for BPH presents to ED today seeking catheter removal.  Pt elicits 14F in place that is patent and draining fluid.  Pt denies pain, fever/chills.  Pt states he has not seen his urologist recently. Metabolic encephalopathy

## 2022-03-21 NOTE — PROGRESS NOTE ADULT - SUBJECTIVE AND OBJECTIVE BOX
Subjective: Patient seen and examined resting in bed. Remains non verbal and not following commands.     Medications:  ascorbic acid 500 milliGRAM(s) Oral daily  chlorhexidine 2% Cloths 1 Application(s) Topical <User Schedule>  cholecalciferol 2000 Unit(s) Oral daily  insulin lispro (ADMELOG) corrective regimen sliding scale   SubCutaneous every 6 hours  meropenem  IVPB 1000 milliGRAM(s) IV Intermittent every 8 hours  metoprolol tartrate 50 milliGRAM(s) Oral two times a day  mexiletine 150 milliGRAM(s) Oral every 8 hours  multivitamin 1 Tablet(s) Oral daily  pantoprazole  Injectable 40 milliGRAM(s) IV Push two times a day  polyethylene glycol 3350 17 Gram(s) Oral daily PRN  senna 2 Tablet(s) Oral at bedtime  sertraline 100 milliGRAM(s) Oral daily  thiamine 100 milliGRAM(s) Oral daily  traMADol 25 milliGRAM(s) Oral once  vancomycin  IVPB 500 milliGRAM(s) IV Intermittent every 12 hours    Vitals:  Vital Signs Last 24 Hours  T(C): 36.4 (22 @ 12:17), Max: 36.6 (22 @ 00:35)  HR: 81 (22 @ 09:09) (65 - 100)  BP: 94/68 (22 @ 09:09) (94/68 - 103/76)  RR: 16 (22 @ 09:09) (16 - 16)  SpO2: 100% (22 @ 09:09) (100% - 100%)    Weight in k.6 ( @ 08:00)    I&O's Summary    20 Mar 2022 07:01  -  21 Mar 2022 07:00  --------------------------------------------------------  IN: 800 mL / OUT: 755 mL / NET: 45 mL        Physical Exam  General: resting in bed  Neck: JVP not appreciated  Chest: Clear to auscultation bilaterally  CV: +VAD hum  Abdomen: Soft, non-distended, non-tender, +keotube  Neurology: opens his eyes however remains non verbal and not following commands     LVAD Interrogation  VAD TYPE HM 2  Speed 9200  Flow 5.2  Power 5.5  PI  6.5  Assessment of driveline exit site: driveline dressing c/d/i  Programming changes: no changes made    Labs:                        8.7    6.42  )-----------( 108      ( 21 Mar 2022 06:29 )             27.5         133<L>  |  100  |  24<H>  ----------------------------<  95  3.8   |  22  |  0.76    Ca    9.4      21 Mar 2022 06:29  Phos  2.2       Mg     1.7         TPro  9.0<H>  /  Alb  2.9<L>  /  TBili  0.5  /  DBili  0.1  /  AST  101<H>  /  ALT  68<H>  /  AlkPhos  315<H>                Lactate Dehydrogenase, Serum: 252 U/L ( @ 06:29)  Lactate Dehydrogenase, Serum: 188 U/L ( @ 18:29)

## 2022-03-21 NOTE — PROGRESS NOTE ADULT - SUBJECTIVE AND OBJECTIVE BOX
Mich Krueger MD    Patient is a 66y old  Male who presents with a chief complaint of Septic shock (21 Mar 2022 11:26)        SUBJECTIVE / OVERNIGHT EVENTS: No new events  TELEMETRY: SR 70-80's      MEDICATIONS  (STANDING):  ascorbic acid 500 milliGRAM(s) Oral daily  chlorhexidine 2% Cloths 1 Application(s) Topical <User Schedule>  cholecalciferol 2000 Unit(s) Oral daily  insulin lispro (ADMELOG) corrective regimen sliding scale   SubCutaneous every 6 hours  meropenem  IVPB 1000 milliGRAM(s) IV Intermittent every 12 hours  metoprolol tartrate 50 milliGRAM(s) Oral two times a day  mexiletine 150 milliGRAM(s) Oral every 8 hours  multivitamin 1 Tablet(s) Oral daily  pantoprazole  Injectable 40 milliGRAM(s) IV Push two times a day  senna 2 Tablet(s) Oral at bedtime  sertraline 100 milliGRAM(s) Oral daily  thiamine 100 milliGRAM(s) Oral daily  traMADol 25 milliGRAM(s) Oral once  vancomycin  IVPB 500 milliGRAM(s) IV Intermittent every 12 hours    MEDICATIONS  (PRN):  polyethylene glycol 3350 17 Gram(s) Oral daily PRN Constipation      Vital Signs Last 24 Hrs  T(C): 36.4 (21 Mar 2022 09:09), Max: 36.6 (21 Mar 2022 00:35)  T(F): 97.5 (21 Mar 2022 09:09), Max: 97.9 (21 Mar 2022 04:35)  HR: 81 (21 Mar 2022 09:09) (65 - 100)  BP: 94/68 (21 Mar 2022 09:09) (94/68 - 103/76)  BP(mean): 76 (21 Mar 2022 09:09) (76 - 98)  RR: 16 (21 Mar 2022 09:09) (16 - 17)  SpO2: 100% (21 Mar 2022 09:09) (100% - 100%)  CAPILLARY BLOOD GLUCOSE      POCT Blood Glucose.: 123 mg/dL (21 Mar 2022 06:02)  POCT Blood Glucose.: 109 mg/dL (21 Mar 2022 00:24)  POCT Blood Glucose.: 88 mg/dL (20 Mar 2022 18:17)  POCT Blood Glucose.: 92 mg/dL (20 Mar 2022 11:53)    I&O's Summary    20 Mar 2022 07:01  -  21 Mar 2022 07:00  --------------------------------------------------------  IN: 800 mL / OUT: 755 mL / NET: 45 mL          PHYSICAL EXAM  GENERAL: NAD, cachetic  HEAD:  Atraumatic, normocephalic; +NGT  EYES: EOMI, PERRLA, conjunctiva and sclera clear  CHEST/LUNG: Clear to auscultation bilaterally; no wheezes  HEART: +LVAD hum  ABDOMEN: Soft, nontender, nondistended; bowel sounds present; R sided driveline with clean dressing in place; R percut dawn drain with bilious drainage  EXTREMITIES:  2+ peripheral pulses; no clubbing, cyanosis, or edema  PSYCH: AAOx0-1, opens eyes to name      LABS:                        8.7    6.42  )-----------( 108      ( 21 Mar 2022 06:29 )             27.5     03-21    133<L>  |  100  |  24<H>  ----------------------------<  95  3.8   |  22  |  0.76    Ca    9.4      21 Mar 2022 06:29  Phos  2.2     03-21  Mg     1.7     03-21    TPro  9.0<H>  /  Alb  2.9<L>  /  TBili  0.5  /  DBili  0.1  /  AST  101<H>  /  ALT  68<H>  /  AlkPhos  315<H>  03-21                RADIOLOGY & ADDITIONAL TESTS:    Imaging Personally Reviewed:  Consultant(s) Notes Reviewed:    Care Discussed with Consultants/Other Providers:

## 2022-03-21 NOTE — PROGRESS NOTE ADULT - SUBJECTIVE AND OBJECTIVE BOX
RICKY JOINT  MRN#: [id number]  Subjective: pulmonary progress note  : acute hypercapnic respiratory failure . Septic shock , service rendered on 2002   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 s/p HM2 LVAD in 2017 at  (due to severe PAD) with TV ring, multiple GI bleeds, thus maintained off AC, CKD 3prior COVID-19 infection in 2020, recurrent syncope post LVAD s/p ILR, s/p prolonged complex hospitalization from -2021 initially admitted with abdominal pain complicated by GIB, respiratory failure s/p trach, multiple infections, s/p cholecystotomy tube and SMA stent 10/2021, ultimately discharged to [facility] [place] rehab and then returned to [facility] for trach decannulation , readmitted in 2022 with recurrent COVID-19 infection, initially in distributive shock. Blood cultures were also positive for serratia marcescens which he has had in the past. seen by heart failure team patient is DNR/DNI  continue on 3 liter nasal canula flat Affect , hyperkalemia lokelma , runs of V-Tach. more stable  very weak , non verbal non communicative on tube feeding , leg booting, CT scan of chest mucous bulging  on Antibiotic and incentive spirometry  , patient receiving one unit of blood transfusion , sleeping at round .RT opacity infiltrate on Antibiotic  , condition is the same          (2022 22:20)    PAST MEDICAL & SURGICAL HISTORY:  CHF (congestive heart failure)    CAD (coronary artery disease)  Depression    Pleural effusion    History of 2019 novel coronavirus disease (COVID-19)  2020    Hemorrhoids    Bleeding hemorrhoids    Peripheral arterial disease    Claudication    BPH with urinary obstruction    ACC/AHA stage D systolic heart failure    Anticoagulation goal of INR 2.0 to 2.5    Falls    Clavicle fracture    CKD (chronic kidney disease), stage III    Iron deficiency anemia    H/O epistaxis    Vertigo    GI bleed    S/P TVR (tricuspid valve repair)    S/P ventricular assist device    S/P endoscopy    H/O tracheostomy        FAMILY HISTORY:    Social History:    Marital Status:  X    (   ) Single    (   )    (  )   Occupation: Retired   Lives with: (  ) alone  (  ) children  X spouse   (  ) parents  (  ) other    Substance Use (street drugs): X never used  (  ) other:  Tobacco Usage: X never smoked   (   ) former smoker   (   ) current smoker  (     ) pack year  (    ) last cigarette date  Alcohol Usage: Social         OBJECTIVE:  ICU Vital Signs Last 24 Hrs  T(C): 37.2 (01 Mar 2022 16:00), Max: 38.4 (01 Mar 2022 09:00)  T(F): 99 (01 Mar 2022 16:00), Max: 101.1 (01 Mar 2022 09:00)  HR: 109 (01 Mar 2022 18:00) (67 - 124)  BP: --  BP(mean): 86 (2022 20:00) (86 - 86)  ABP: 89/74 (01 Mar 2022 18:00) (75/63 - 160/85)  ABP(mean): 81 (01 Mar 2022 18:00) (68 - 139)  RR: 38 (01 Mar 2022 18:00) (5 - 40)  SpO2: 99% (01 Mar 2022 18:00) (94% - 100%)       @ 07: @ 07:00  --------------------------------------------------------  IN: 1060.7 mL / OUT: 1600 mL / NET: -539.3 mL     @ 07: @ 18:22  --------------------------------------------------------  IN: 677.8 mL / OUT: 455 mL / NET: 222.8 mL    PHYSICAL EXAM :Daily Height in cm: 177.8 (2022 21:20)  Elderly frail male on 3 liter nasal cannula    Daily Weight in k.7 (2022 21:20)  HEENT:     + NCAT  + EOMI  - Conjuctival edema   - Icterus   - Thrush   - ETT  - NGT/OGT  Neck:         + FROM RT IJ  JVD     - Nodes     - Masses    + Mid-line trachea   - Tracheostomy  Chest:           mild kyphosis   Lungs:          + CTA  + Rhonchi  + Rales    - Wheezing  + Decreased BS   - Dullness R L  Cardiac:       + S1 + S2    + RRR   - Irregular   - S3  - S4    - Murmurs   - Rub   - Hamman’s sign   Abdomen:    + BS     + Soft    + Non-tender  - Distended  - Organomegaly  - PEG Cholecystostomy tube in place   Extremities:   - Cyanosis U/L   - Clubbing  U/L  - LE/UE Edema   + Capillary refill    + Pulses   Neuro:        + Awake   +  Alert   - Confused   - Lethargic   - Sedated   - Generalized Weakness  Skin:        - Rashes    - Erythema   + Normal incisions   + IV sites intact  -      HOSPITAL MEDICATIONS: All mediciations reviewed and analyzed  MEDICATIONS  (STANDING):  albumin human  5% IVPB 250 milliLiter(s) IV Intermittent once  cefepime   IVPB 2000 milliGRAM(s) IV Intermittent every 12 hours  chlorhexidine 2% Cloths 1 Application(s) Topical <User Schedule>  cholecalciferol 1000 Unit(s) Oral daily  dextrose 40% Gel 15 Gram(s) Oral once  dextrose 50% Injectable 25 Gram(s) IV Push once  gabapentin Solution 100 milliGRAM(s) Oral three times a day  glucagon  Injectable 1 milliGRAM(s) IntraMuscular once  insulin lispro (ADMELOG) corrective regimen sliding scale   SubCutaneous every 6 hours  magnesium sulfate  IVPB 1 Gram(s) IV Intermittent once  methimazole 10 milliGRAM(s) Oral daily  metoclopramide 10 milliGRAM(s) Oral four times a day  mexiletine 150 milliGRAM(s) Oral every 8 hours  mirtazapine 7.5 milliGRAM(s) Oral at bedtime  multivitamin 1 Tablet(s) Oral daily  pantoprazole  Injectable 40 milliGRAM(s) IV Push daily  potassium phosphate IVPB 15 milliMole(s) IV Intermittent once  senna 2 Tablet(s) Oral at bedtime  sertraline 100 milliGRAM(s) Oral daily  vancomycin  IVPB 750 milliGRAM(s) IV Intermittent every 24 hours  vasopressin Infusion 0.02 Unit(s)/Min (1.2 mL/Hr) IV Continuous <Continuous>    MEDICATIONS  (PRN):    LABS: All Lab data reviewed and analyzed                        8.7    6.42  )-----------( 108      ( 21 Mar 2022 06:29 )             27.5    03-21    133<L>  |  100  |  24<H>  ----------------------------<  95  3.8   |  22  |  0.76    Ca    9.4      21 Mar 2022 06:29  Phos  2.2     03-21  Mg     1.7     -    TPro  8.9<H>  /  Alb  3.1<L>  /  TBili  0.7  /  DBili  x   /  AST  49<H>  /  ALT  58<H>  /  AlkPhos  206<H>      Ca    9.8      19 Mar 2022 06:11  Phos  2.4     03-19  Mg     2.2     -19    TPro  9.5<H>  /  Alb  3.1<L>  /  TBili  0.6  /  DBili  x   /  AST  83<H>  /  ALT  85<H>  /  AlkPhos  242<H>      Ca    9.9      16 Mar 2022 06:37  Phos  2.7     03-15  Mg     1.9     -    TPro  9.4<H>  /  Alb  3.2<L>  /  TBili  0.4  /  DBili  x   /  AST  35  /  ALT  24  /  AlkPhos  207<H>               PTT - ( 01 Mar 2022 00:08 )  PTT:25.0 sec LIVER FUNCTIONS - ( 01 Mar 2022 17:38 )  Alb: 2.9 g/dL / Pro: 7.3 g/dL / ALK PHOS: 116 U/L / ALT: 30 U/L / AST: 29 U/L / GGT: x           RADIOLOGY: - Reviewed and analyzed

## 2022-03-21 NOTE — PROGRESS NOTE ADULT - PROBLEM SELECTOR PLAN 1
- 2/28 BCx + serratia/GNR  - WBC overall improving now with broaden antibiotics.   - CT C/A/P 3/18 concerning for mucous plugging with possible aspiration PNA  - palliative care following, family meeting held on 3/17. At this time the family wishes to continue with course of treatment and would like PEG to be placed. Will need to get clearance from ID prior to proceeding.

## 2022-03-21 NOTE — PROGRESS NOTE ADULT - PROBLEM SELECTOR PLAN 9
- currently being fed via keotube  - at this time patient is unable to tolerate PO diet. Family wishes to proceed with PEG placement  - GI following, will need to determine date of PEG placement (of note will need to be booked with cardiac anesthesia)  - No absolute contra indication to placing a PEG from an cardiac perspective, but will need to clearance from ID and GI bleeding perspective

## 2022-03-22 NOTE — PROGRESS NOTE ADULT - SUBJECTIVE AND OBJECTIVE BOX
Chief Complaint:  Patient is a 66y old  Male who presents with a chief complaint of Septic shock (22 Mar 2022 11:41)      Reason for consult:    Interval Events:     Hospital Medications:  ascorbic acid 500 milliGRAM(s) Oral daily  chlorhexidine 2% Cloths 1 Application(s) Topical <User Schedule>  cholecalciferol 2000 Unit(s) Oral daily  insulin lispro (ADMELOG) corrective regimen sliding scale   SubCutaneous every 6 hours  meropenem  IVPB 1000 milliGRAM(s) IV Intermittent every 8 hours  metoprolol tartrate 50 milliGRAM(s) Oral two times a day  mexiletine 150 milliGRAM(s) Oral every 8 hours  multivitamin 1 Tablet(s) Oral daily  pantoprazole  Injectable 40 milliGRAM(s) IV Push two times a day  polyethylene glycol 3350 17 Gram(s) Oral daily PRN  senna 2 Tablet(s) Oral at bedtime  sertraline 100 milliGRAM(s) Oral daily  thiamine 100 milliGRAM(s) Oral daily  traMADol 25 milliGRAM(s) Oral once  vancomycin  IVPB 500 milliGRAM(s) IV Intermittent every 12 hours      ROS:   General:  No  fevers, chills, night sweats, fatigue  Eyes:  Good vision, no reported pain  ENT:  No sore throat, pain, runny nose  CV:  No pain, palpitations  Pulm:  No dyspnea, cough  GI:  See HPI, otherwise negative  :  No  incontinence, nocturia  Muscle:  No pain, weakness  Neuro:  No memory problems  Psych:  No insomnia, mood problems, depression  Endocrine:  No polyuria, polydipsia, cold/heat intolerance  Heme:  No petechiae, ecchymosis, easy bruisability  Skin:  No rash    PHYSICAL EXAM:   Vital Signs:  Vital Signs Last 24 Hrs  T(C): 36.3 (22 Mar 2022 10:02), Max: 37 (22 Mar 2022 09:30)  T(F): 97.3 (22 Mar 2022 10:02), Max: 98.6 (22 Mar 2022 09:30)  HR: 84 (22 Mar 2022 10:02) (84 - 117)  BP: 84/67 (22 Mar 2022 10:02) (84/67 - 102/73)  BP(mean): 74 (22 Mar 2022 10:07) (73 - 84)  RR: 16 (22 Mar 2022 10:02) (12 - 18)  SpO2: 100% (22 Mar 2022 10:02) (100% - 100%)  Daily     Daily Weight in k.1 (22 Mar 2022 07:09)    GENERAL: no acute distress  NEURO: alert  HEENT: anicteric sclera, no conjunctival pallor appreciated  CHEST: no respiratory distress, no accessory muscle use  CARDIAC: regular rate, rhythm  ABDOMEN: soft, non-tender, non-distended, no rebound or guarding  EXTREMITIES: warm, well perfused, no edema  SKIN: no lesions noted    LABS: reviewed                        8.4    6.20  )-----------( 114      ( 22 Mar 2022 08:58 )             26.4     03-    133<L>  |  99  |  26<H>  ----------------------------<  143<H>  4.0   |  27  |  0.69    Ca    9.1      22 Mar 2022 08:57  Phos  2.1       Mg     1.6         TPro  9.0<H>  /  Alb  2.9<L>  /  TBili  0.5  /  DBili  0.1  /  AST  101<H>  /  ALT  68<H>  /  AlkPhos  315<H>      LIVER FUNCTIONS - ( 21 Mar 2022 06:29 )  Alb: 2.9 g/dL / Pro: 9.0 g/dL / ALK PHOS: 315 U/L / ALT: 68 U/L / AST: 101 U/L / GGT: x             Interval Diagnostic Studies: see sunrise for full report   Chief Complaint:  Patient is a 66y old  Male who presents with a chief complaint of Septic shock (22 Mar 2022 11:41)      Interval Events:   Called back by team to re-assess for PEG placement  Afebrile and cleared by ID  Cards reporting high risk for EGD/colon but reporting no absolute contraindication for PEG placement    Hospital Medications:  ascorbic acid 500 milliGRAM(s) Oral daily  chlorhexidine 2% Cloths 1 Application(s) Topical <User Schedule>  cholecalciferol 2000 Unit(s) Oral daily  insulin lispro (ADMELOG) corrective regimen sliding scale   SubCutaneous every 6 hours  meropenem  IVPB 1000 milliGRAM(s) IV Intermittent every 8 hours  metoprolol tartrate 50 milliGRAM(s) Oral two times a day  mexiletine 150 milliGRAM(s) Oral every 8 hours  multivitamin 1 Tablet(s) Oral daily  pantoprazole  Injectable 40 milliGRAM(s) IV Push two times a day  polyethylene glycol 3350 17 Gram(s) Oral daily PRN  senna 2 Tablet(s) Oral at bedtime  sertraline 100 milliGRAM(s) Oral daily  thiamine 100 milliGRAM(s) Oral daily  traMADol 25 milliGRAM(s) Oral once  vancomycin  IVPB 500 milliGRAM(s) IV Intermittent every 12 hours      ROS:   Unable to obtain due to patient's condition    PHYSICAL EXAM:   Vital Signs:  Vital Signs Last 24 Hrs  T(C): 36.3 (22 Mar 2022 10:02), Max: 37 (22 Mar 2022 09:30)  T(F): 97.3 (22 Mar 2022 10:02), Max: 98.6 (22 Mar 2022 09:30)  HR: 84 (22 Mar 2022 10:02) (84 - 117)  BP: 84/67 (22 Mar 2022 10:02) (84/67 - 102/73)  BP(mean): 74 (22 Mar 2022 10:07) (73 - 84)  RR: 16 (22 Mar 2022 10:02) (12 - 18)  SpO2: 100% (22 Mar 2022 10:02) (100% - 100%)  Daily     Daily Weight in k.1 (22 Mar 2022 07:09)    GENERAL: ill appearing  NEURO: awake, non verbal  HEENT: NGT in place  CHEST: no respiratory distress, no accessory muscle use  CARDIAC: regular rate  ABDOMEN: soft, non-tender, non-distended, no rebound or guarding; perchole in place  EXTREMITIES: cool to touch, no edema  SKIN: dry    LABS: reviewed                        8.4    6.20  )-----------( 114      ( 22 Mar 2022 08:58 )             26.4     03-    133<L>  |  99  |  26<H>  ----------------------------<  143<H>  4.0   |  27  |  0.69    Ca    9.1      22 Mar 2022 08:57  Phos  2.1     -  Mg     1.6         TPro  9.0<H>  /  Alb  2.9<L>  /  TBili  0.5  /  DBili  0.1  /  AST  101<H>  /  ALT  68<H>  /  AlkPhos  315<H>  -21    LIVER FUNCTIONS - ( 21 Mar 2022 06:29 )  Alb: 2.9 g/dL / Pro: 9.0 g/dL / ALK PHOS: 315 U/L / ALT: 68 U/L / AST: 101 U/L / GGT: x             Interval Diagnostic Studies: see sunrise for full report

## 2022-03-22 NOTE — PROGRESS NOTE ADULT - ASSESSMENT
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 s/p HM2 LVAD in 9/2017 at  (due to severe PAD) with TV ring, multiple GI bleeds, thus maintained off AC, CKD 3prior COVID-19 infection in 4/2020, recurrent syncope post LVAD s/p ILR, s/p prolonged complex hospitalization from 6/14-11/16/2021 initially admitted with abdominal pain complicated by GIB, respiratory failure s/p trach, multiple infections, s/p cholecystotomy tube and SMA stent 10/2021, ultimately discharged to [facility] rehab and then returned to [facility] for trach decannulation 12/2, readmitted in 2/27/2022 with recurrent COVID-19 infection, initially in distributive shock.  Endocrine team consulted on 3/14 for hyperthyroidism management.  Pt was on methimazole 10 mg daily with iatrogenic hypothyroidism. Pt was dx with thyrotoxicosis in the setting of amiodarone use and multiple IV contrast studies in 11/2021 and has been on methimazole since.  TSI and TSHrAb were negative.      # Hyperthyroidism dx in 8/2021 after use of amiodarone (last use 8/2021) and multiple IV contrast   - Prior to 8/2021, TSH level were within normal range.  TSI and TSHrAb were negative in 8/2021.  Unable to get thyroid u/s due to tracheostomy.  TPO mildly positive in 8/2021.  It seems that pt has been on methimazole since 8/2021, and TSH done during this admission suggest iatrogenic hypothyroidism.  - It appears that thyrotoxicosis from amiodarone and multiple IV contrast have resolved. Can remain off methimazole for now.  - pt is non-verbal, so monitor for any thyrotoxicosis signs, such as tachycardia and diarrhea   -Repeat TFTs in 4-6 weeks.  -Will sign off at that time. Please reconsult if needed.        Billy Tipton D.O  [phone]

## 2022-03-22 NOTE — PROGRESS NOTE ADULT - SUBJECTIVE AND OBJECTIVE BOX
Mich Krueger MD    Patient is a 66y old  Male who presents with a chief complaint of Septic shock (21 Mar 2022 19:40)        SUBJECTIVE / OVERNIGHT EVENTS: No new events  TELEMETRY: SR/ST 's      MEDICATIONS  (STANDING):  ascorbic acid 500 milliGRAM(s) Oral daily  chlorhexidine 2% Cloths 1 Application(s) Topical <User Schedule>  cholecalciferol 2000 Unit(s) Oral daily  insulin lispro (ADMELOG) corrective regimen sliding scale   SubCutaneous every 6 hours  meropenem  IVPB 1000 milliGRAM(s) IV Intermittent every 8 hours  metoprolol tartrate 50 milliGRAM(s) Oral two times a day  mexiletine 150 milliGRAM(s) Oral every 8 hours  multivitamin 1 Tablet(s) Oral daily  pantoprazole  Injectable 40 milliGRAM(s) IV Push two times a day  senna 2 Tablet(s) Oral at bedtime  sertraline 100 milliGRAM(s) Oral daily  sodium phosphate IVPB 30 milliMole(s) IV Intermittent once  thiamine 100 milliGRAM(s) Oral daily  traMADol 25 milliGRAM(s) Oral once  vancomycin  IVPB 500 milliGRAM(s) IV Intermittent every 12 hours    MEDICATIONS  (PRN):  polyethylene glycol 3350 17 Gram(s) Oral daily PRN Constipation      Vital Signs Last 24 Hrs  T(C): 36.3 (22 Mar 2022 10:02), Max: 37 (22 Mar 2022 09:30)  T(F): 97.3 (22 Mar 2022 10:02), Max: 98.6 (22 Mar 2022 09:30)  HR: 84 (22 Mar 2022 10:02) (81 - 117)  BP: 84/67 (22 Mar 2022 10:02) (84/67 - 102/73)  BP(mean): 74 (22 Mar 2022 10:07) (73 - 84)  RR: 16 (22 Mar 2022 10:02) (12 - 18)  SpO2: 100% (22 Mar 2022 10:02) (100% - 100%)  CAPILLARY BLOOD GLUCOSE      POCT Blood Glucose.: 142 mg/dL (22 Mar 2022 08:07)  POCT Blood Glucose.: 130 mg/dL (22 Mar 2022 04:55)  POCT Blood Glucose.: 122 mg/dL (21 Mar 2022 23:48)  POCT Blood Glucose.: 120 mg/dL (21 Mar 2022 22:04)  POCT Blood Glucose.: 118 mg/dL (21 Mar 2022 16:48)  POCT Blood Glucose.: 115 mg/dL (21 Mar 2022 12:08)    I&O's Summary    21 Mar 2022 07:01  -  22 Mar 2022 07:00  --------------------------------------------------------  IN: 840 mL / OUT: 1150 mL / NET: -310 mL          PHYSICAL EXAM  GENERAL: NAD, cachetic  HEAD:  Atraumatic, normocephalic; +NGT  EYES: EOMI, PERRLA, conjunctiva and sclera clear  CHEST/LUNG: Clear to auscultation bilaterally; no wheezes  HEART: +LVAD hum  ABDOMEN: Soft, nontender, nondistended; bowel sounds present; R sided driveline with clean dressing in place; R percut dawn drain with bilious drainage  EXTREMITIES:  2+ peripheral pulses; no clubbing, cyanosis, or edema  PSYCH: AAOx0-1, opens eyes to name      LABS:                        8.4    6.20  )-----------( 114      ( 22 Mar 2022 08:58 )             26.4     03-22    133<L>  |  99  |  26<H>  ----------------------------<  143<H>  4.0   |  27  |  0.69    Ca    9.1      22 Mar 2022 08:57  Phos  2.1     03-22  Mg     1.6     03-22    TPro  9.0<H>  /  Alb  2.9<L>  /  TBili  0.5  /  DBili  0.1  /  AST  101<H>  /  ALT  68<H>  /  AlkPhos  315<H>  03-21                RADIOLOGY & ADDITIONAL TESTS:    Imaging Personally Reviewed:  Consultant(s) Notes Reviewed:    Care Discussed with Consultants/Other Providers:

## 2022-03-22 NOTE — PROGRESS NOTE ADULT - ASSESSMENT
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 s/p HM2 LVAD in 9/2017 at  (due to severe PAD) with TV ring, multiple GI bleeds, thus maintained off AC, prior COVID-19 infection in 4/2020, recurrent syncope post LVAD s/p ILR, prolonged complex hospitalization from 6/14-11/16/2021 with GIB, respiratory failure s/p trach decanulated 12/2021, multiple infections, s/p cholecystotomy tube and SMA stent 10/2021, recurrent COVID 19 reinfection s/p MAB/remdesivir/steroids and distributive shock with serratia bacteremia, recurrent VT on mexiletine transferred from [facility] ED with metabolic encephalopathy, hypercarbic respiratory failure weaned off bipap, septic shock requiring pressors (now on midodrine) with serratia bacteremia on cefepime, intermittent h/h drop s/p total 3units prbc (last 3/6), episodes of recurrent VT s/p lidocaine gtt now back on mexiletine.

## 2022-03-22 NOTE — PROGRESS NOTE ADULT - ATTENDING COMMENTS
GI reconsulted for possible PEG for complicated case  67 yo M pmh NICM with EF 10% with LVAD, h/o active T-E Fistula that ENT not planning repair for, h/o SB bleeding requiring GI intervention, and now s/p multiple infections (thought that LVAD  may be source) and treatment courses of abx, s/p perc dawn (still in place), hypoxic resp failure s/p trach and currently getting NGT feeds.  He is incredibly high risk for any procedure even when he is fully optimized.  Would not plan for endoscopic PEG placement.  He is safely receiving tube feeds via dobhoff and this is a reasonable long term plan.  If PEG still wishing to be considered - reasonable to consider IR or surgical consults.

## 2022-03-22 NOTE — PROGRESS NOTE ADULT - ASSESSMENT
66 years old male with PMHx of Stage D HF 2/2 NICM s/p HM2 LVAD in 9/2017 at  (due to severe PAD) with TV ring, multiple GI bleeds, no AC, CKD 3 prior COVID-19 infection in 4/2020, chronic cholecystitis s/p percutaneous cholecystostomy tube, recurrent COVID infection, Serratia bacteremia transferred from [facility] for sepsis.    ID is consulted for septic shock  Recently had Serratia bacteremia discharged on suppressive PO Levaquin  Returned with leukocytosis, fever, AMS, hypotension requiring pressors  ?purulent sputum coming out from previous trach site  CXR no PNA  CTH unrevealing  LVAD exit sites shows no signs of infection  Perc dawn tube site shows no signs of infection  BCx Serratia, S cefepime and ertapenem, R quinolones  CT showed abdominal aortic thrombus and a 2.6cm splenic lesion    Antibiotics:  Vancomycin 2/28 - 3/1  Zosyn 2/28  Cefepime 2/28 -     Currently unclear etiology of septic shock likely secondary to recurrent serratia bacteremia  Source of serratia is could be LVAD, infected aortic thrombus or splenic lesion  Regardless will treat this as in intravascular infection so likely 4 - 6 weeks in total    IMPRESSION:  Septic shock 2/2 serratia bacteremia  Aortic thrombus  Splenic lesion  Leukocytosis  Fever  LVAD      RECOMMENDATIONS:  Patient with new fevers, tachycardia, tachypnea    differential aspiration event in the setting of NG feeds, bacteremia secondary to a line source, COVID acquisition intra-abdominal process    Sent blood cultures x2 sets 3/17 with NGTD  CT of chest suggestive of mucus plugging with possible aspiration pneumonia event  Can discontinue the Vancomycin as no evidence of MRSA on blood cultures  If blood cultures are finalized from 3/17 as negative would discontinue the Vancomycin and complete a course of Meorpenem x7days prior to de-escalate back to cover prior serratia bacteremia /driveline infection    Patient can undergo placement of PEG if aligned with C        Morris Prater MD  Can be called via Teams  After 5pm/weekends [phone]

## 2022-03-22 NOTE — PROGRESS NOTE ADULT - SUBJECTIVE AND OBJECTIVE BOX
INFECTIOUS DISEASES FOLLOW UP-- Anitra Prater  [phone]    This is a follow up note for this  66yMale with  Sepsisserratia bacteremia chronic driveline infection        ROS:  CONSTITUTIONAL: opens eyes not interactive    Allergies    Amiodarone Hydrochloride (Other (Severe))    Intolerances        ANTIBIOTICS/RELEVANT:  antimicrobials  meropenem  IVPB 1000 milliGRAM(s) IV Intermittent every 8 hours  vancomycin  IVPB 500 milliGRAM(s) IV Intermittent every 12 hours    immunologic:    OTHER:  ascorbic acid 500 milliGRAM(s) Oral daily  chlorhexidine 2% Cloths 1 Application(s) Topical <User Schedule>  cholecalciferol 2000 Unit(s) Oral daily  insulin lispro (ADMELOG) corrective regimen sliding scale   SubCutaneous every 6 hours  metoprolol tartrate 50 milliGRAM(s) Oral two times a day  mexiletine 150 milliGRAM(s) Oral every 8 hours  multivitamin 1 Tablet(s) Oral daily  pantoprazole  Injectable 40 milliGRAM(s) IV Push two times a day  polyethylene glycol 3350 17 Gram(s) Oral daily PRN  senna 2 Tablet(s) Oral at bedtime  sertraline 100 milliGRAM(s) Oral daily  thiamine 100 milliGRAM(s) Oral daily  traMADol 25 milliGRAM(s) Oral once      Objective:  Vital Signs Last 24 Hrs  T(C): 36.3 (22 Mar 2022 10:02), Max: 37 (22 Mar 2022 09:30)  T(F): 97.3 (22 Mar 2022 10:02), Max: 98.6 (22 Mar 2022 09:30)  HR: 84 (22 Mar 2022 10:02) (84 - 117)  BP: 84/67 (22 Mar 2022 10:02) (84/67 - 102/73)  BP(mean): 74 (22 Mar 2022 10:07) (73 - 84)  RR: 16 (22 Mar 2022 10:02) (12 - 18)  SpO2: 100% (22 Mar 2022 10:02) (100% - 100%)    PHYSICAL EXAM:  Constitutional:no acute distress  Eyes:ALONSO, EOMI  Ear/Nose/Throat: no oral lesions, old trach stoma	  Respiratory: audible bilateral  Cardiovascular: S1S2 LVAD sounds  Gastrointestinal:soft, (+) BS, no tenderness  cholecystotomy tube with bilious drainage  LVAD ext site CDI  Extremities:no e/e/c  No Lymphadenopathy  IV sites not inflammed.    LABS:                        8.4    6.20  )-----------( 114      ( 22 Mar 2022 08:58 )             26.4     03-22    133<L>  |  99  |  26<H>  ----------------------------<  143<H>  4.0   |  27  |  0.69    Ca    9.1      22 Mar 2022 08:57  Phos  2.1     03-22  Mg     1.6     03-22    TPro  9.0<H>  /  Alb  2.9<L>  /  TBili  0.5  /  DBili  0.1  /  AST  101<H>  /  ALT  68<H>  /  AlkPhos  315<H>  03-21          MICROBIOLOGY:            RECENT CULTURES:  03-17 @ 14:38  .Blood Blood  --  --  --    No Growth Final  --  03-17 @ 14:37  .Blood Blood  --  --  --    No Growth Final  --      RADIOLOGY & ADDITIONAL STUDIES:    < from: CT Abdomen and Pelvis No Cont (03.17.22 @ 20:52) >  IMPRESSION:  Right lower lobe mucus plugging and lung opacities, possibly infectious.  Percutaneous cholecystostomy tube in place.    < end of copied text >

## 2022-03-22 NOTE — PROGRESS NOTE ADULT - PROBLEM SELECTOR PLAN 1
- 2/28 BCx + serratia/GNR  - WBC overall improving now with broaden antibiotics.   - CT C/A/P 3/18 concerning for mucous plugging with possible aspiration PNA  - palliative care following, family meeting held on 3/17. At this time the family wishes to continue with course of treatment and would like PEG to be placed. ID has cleared patient to undergo PEG placement.

## 2022-03-22 NOTE — PROGRESS NOTE ADULT - ASSESSMENT
Assessment and Recommendation:   · Assessment	  Assessment and recommendation :  Acute hypercapnic respiratory Failure on 3 liter on nasal cannula    S/P Septic shock   Bacteremia with serratia marcescens  mucous plug on antibiotic hold on PEG   Severe anemia with low H/H   S/P transfuse another  unit of Packed RBCs   Severe ischemic cardiomyopathy   ACC/AHA stage D systolic heart failure  severe ischemic cardiomyopathy EF 10%  wound care for sacral and bilateral buttock wound   Peripheral vascular Disease   PAF on no ACO   One RT LL opacity improved will clear for PEG placement   H/O of repeated GI bleeding   S/P mesenteric ischemia   GERD on Reglan   S/P HM2 LVAD , VT on Mexiletine   hyperthyroidism on  methimazole   severe protein caloric malnutrition   severe neuropathy   Major depression   Continue Antibiotic cefepime   NG tube feeding   GI prophylaxis   patient is DNR/DNI

## 2022-03-22 NOTE — PROGRESS NOTE ADULT - SUBJECTIVE AND OBJECTIVE BOX
Behavioral Cardiology Progress Note    History of present illness: Mr. Quispe is a 66-year-old male with PMHx of non-ischemic cardiomyopathy s/p LVAD, multiple GI bleeds, CKD, COVID-19 infection, recurrent Serratia bacteremia with persistent AMS. As per neurology, decreased LOC with non-localizing exam. Mental status has been unchanged.     Social history: , lives in sister’s house in [place]. Has 3 sons (2 live in , 1 in [facility]). Oldest son (Kang Quispe) lives in [place]. Not close with his children. One of 3 siblings. Lives in sister’s house in [place] (Cristina Rudolph [phone]), also in the home are niece [facility] [phone]) and nephew (Miller Rudolph). Another sister lives close by in [place] and all other siblings live in [place] which the family visit often. Prior to health issues worked full time at [employer] in [place], stopped working due to heart disease. Education: high school graduate.     Substance use: (information from previous assessment)  Tobacco: Denies. Former smoker, 8-10 cigarettes/day for 30 years, quit prior to LVAD 2017.   Alcohol: None for past 6 years. Past use of 3-4 drinks of Johnson 2x/week.  Drug: Denies any drug use.

## 2022-03-22 NOTE — PROGRESS NOTE ADULT - ASSESSMENT
Mental Status Exam: Seen lying in bed on 2 DSU. Variable attention, unable to evaluate orientation. Nonverbal. Did not squeeze hand to questions. Unable to complete full assessment.    Past psychiatric history: Denies as per chart.     Psychological assessment: Did not initially look at writer, inattentive. Seemed to sustain attention/gaze on writer later on. 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 Maintained gaze but unresponsive and nonverbal. Unable to complete full assessment. Since this admission, remains nonverbal. Unable to complete full assessment due to difficulty communicating.     Dx: Adjustment disorder, unspecified. F43.20; r/o Delirium, F06; Systolic Heart failure I50.2; LVAD Z95.811     Recommendations:   Continue to engage with patient as often as possible  May benefit from Palliative consult to discuss his goals of care  Holistic RN  Behavioral cardiology will follow as needed    5 minutes spent on total patient encounter.    Kizzy Madrigal MA, MPH  Psychological Extern     Jessica Hennessy, PhD  Supervising Psychologist

## 2022-03-22 NOTE — CHART NOTE - NSCHARTNOTEFT_GEN_A_CORE
Wound Care Team Note:    Request for reevaluation of sacral/bilateral buttocks wound repeated over night from nursing. Patient seen and reevaluated 3.21.22. Spoke with clinical RN. Please refer to my wound care note dated 3.21.22.    Trisha Hodge, NP-C, [facility] [id number]

## 2022-03-22 NOTE — PROGRESS NOTE ADULT - SUBJECTIVE AND OBJECTIVE BOX
Subjective: Patient seen and examined resting in bed. Remains non-verbal     Medications:  ascorbic acid 500 milliGRAM(s) Oral daily  chlorhexidine 2% Cloths 1 Application(s) Topical <User Schedule>  cholecalciferol 2000 Unit(s) Oral daily  insulin lispro (ADMELOG) corrective regimen sliding scale   SubCutaneous every 6 hours  meropenem  IVPB 1000 milliGRAM(s) IV Intermittent every 8 hours  metoprolol tartrate 50 milliGRAM(s) Oral two times a day  mexiletine 150 milliGRAM(s) Oral every 8 hours  multivitamin 1 Tablet(s) Oral daily  pantoprazole  Injectable 40 milliGRAM(s) IV Push two times a day  polyethylene glycol 3350 17 Gram(s) Oral daily PRN  senna 2 Tablet(s) Oral at bedtime  sertraline 100 milliGRAM(s) Oral daily  sodium phosphate IVPB 30 milliMole(s) IV Intermittent once  thiamine 100 milliGRAM(s) Oral daily  traMADol 25 milliGRAM(s) Oral once  vancomycin  IVPB 500 milliGRAM(s) IV Intermittent every 12 hours    Vitals:  Vital Signs Last 24 Hours  T(C): 36.3 (22 @ 10:02), Max: 37 (22 @ 09:30)  HR: 84 (22 @ 10:02) (81 - 117)  BP: 84/67 (22 @ 10:02) (84/67 - 102/73)  RR: 16 (22 @ 10:02) (12 - 18)  SpO2: 100% (22 @ 10:02) (100% - 100%)    Weight in k.1 ( @ 07:09)    I&O's Summary    21 Mar 2022 07:01  -  22 Mar 2022 07:00  --------------------------------------------------------  IN: 840 mL / OUT: 1150 mL / NET: -310 mL        Physical Exam  General: No distress. Comfortable.  HEENT: EOM intact.  Neck: Neck supple. JVP not elevated. No masses  Chest: Clear to auscultation bilaterally  CV: +VAD hum  Abdomen: Soft, non-distended, non-tender, +keotube   Neurology: awake but remains non verbal and not following commands     LVAD Interrogation  VAD TYPE HM 2  Speed 9200  Flow 4.5  Power5.2  PI 5.7  Assessment of driveline exit site: driveline dressing c/d/i  Programming changes: no changes made    Labs:                        8.4    6.20  )-----------( 114      ( 22 Mar 2022 08:58 )             26.4         133<L>  |  99  |  26<H>  ----------------------------<  143<H>  4.0   |  27  |  0.69    Ca    9.1      22 Mar 2022 08:57  Phos  2.1       Mg     1.6         TPro  9.0<H>  /  Alb  2.9<L>  /  TBili  0.5  /  DBili  0.1  /  AST  101<H>  /  ALT  68<H>  /  AlkPhos  315<H>                Lactate Dehydrogenase, Serum: 252 U/L ( @ 06:29)

## 2022-03-22 NOTE — CONSULT NOTE ADULT - SUBJECTIVE AND OBJECTIVE BOX
Interventional Radiology    Evaluate for Procedure: PEG placement    HPI: 66 year old male with a PMH of NICM with EF 10% with LVAD, h/o active T-E Fistula that ENT not planning repair for, h/o SB bleeding requiring GI intervention, and now s/p multiple infections (thought that LVAD  may be source) and treatment courses of ABX, s/p perc dawn (still in place), hypoxic resp failure s/p trach and currently getting NGT feeds. Per GI no plan for endoscopic PEG placement. IR consulted for PEG placement.    Allergies: Amiodarone Hydrochloride (Other (Severe))    Medications (Abx/Cardiac/Anticoagulation/Blood Products)  meropenem  IVPB: 100 mL/Hr IV Intermittent (03-21 @ 05:28)  meropenem  IVPB: 100 mL/Hr IV Intermittent (03-22 @ 05:01)  metoprolol tartrate: 50 milliGRAM(s) Oral (03-22 @ 05:02)  mexiletine: 150 milliGRAM(s) Oral (03-22 @ 05:02)  vancomycin  IVPB: 100 mL/Hr IV Intermittent (03-22 @ 05:01)    Data:  T(C): 36.3  HR: 84  BP: 84/67  RR: 16  SpO2: 100%    -WBC 6.20 / HgB 8.4 / Hct 26.4 / Plt 114  -Na 133 / Cl 99 / BUN 26 / Glucose 143  -K 4.0 / CO2 27 / Cr 0.69  -ALT -- / Alk Phos -- / T.Bili --  -INR -- / PTT 28.7    Radiology:  < from: CT Abdomen and Pelvis No Cont (03.17.22 @ 20:52) >  PROCEDURE DATE:  03/17/2022      INTERPRETATION:  CLINICAL INFORMATION: Sepsis    COMPARISON: 3/3/2022    CONTRAST/COMPLICATIONS:  IV Contrast: NONE  Oral Contrast: NONE  Complications: None reported at time of study completion    PROCEDURE:  CT of the Chest, Abdomen and Pelvis was performed.  Sagittal and coronal reformats were performed.    FINDINGS:  Please note thatevaluation of the chest/abdominal organs and vascular   structures is limited by lack of intravenous contrast.    CHEST:  LUNGS AND LARGE AIRWAYS: Mucous or secretions within the trachea and   extending into the right mainstem bronchus and right lower lobe branches.   Emphysema. Right lower lobe mucus plugging and opacities. Lingular   atelectasis.  PLEURA: No pleural effusion.  VESSELS: Within normal limits.  HEART: LVAD and tricuspid valve replacement. No pericardial effusion.  MEDIASTINUM AND DON: No lymphadenopathy.  CHEST WALL AND LOWER NECK: Evidence of prior tracheostomy. Sternotomy.    ABDOMEN AND PELVIS:  LIVER: Within normal limits.  BILE DUCTS: Normal caliber.  GALLBLADDER: Percutaneous cholecystostomy tube.  SPLEEN: Poor visualization of splenic hypodensity due to streak artifact   and lack of intravenous contrast.  PANCREAS: Poorly visualized due to lack of intravenous contrast and   streak artifact.  ADRENALS: Within normal limits.  KIDNEYS/URETERS: Within normal limits.    BLADDER: Within normal limits.  REPRODUCTIVE ORGANS: Prostate is mildly enlarged.    BOWEL: Enteric tube tip in stomach. No bowel obstruction. Appendix is   within normal limits.  PERITONEUM: No ascites.  VESSELS: Atherosclerotic calcifications. Leftcommon iliac artery and   superior mesenteric artery stents.  RETROPERITONEUM/LYMPH NODES: No lymphadenopathy.  ABDOMINAL WALL: Subcutaneous soft tissue edema.  BONES: Degenerative changes.    IMPRESSION:  Right lower lobe mucus plugging and lung opacities, possibly infectious.  Percutaneous cholecystostomy tube in place.

## 2022-03-22 NOTE — PROGRESS NOTE ADULT - ASSESSMENT
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    S/p prolonged complex hospitalization from 6/14-11/16/2021 initially admitted with abdominal pain complicated by GIB, respiratory failure s/p trach, multiple infections, s/p cholecystotomy tube and SMA stent 10/2021, ultimately discharged to [facility] rehab and then returned to [facility] for trach decannulation 12/2. The patient had a recent admission with COVID 19 reinfection and distributive shock. That admission he had blood culture positive for serratia marcescens (discharged on levaquin).  He was treated with MAB, remdesivir, and steroids. He had recurrent VT and was started mexiletine.     Now coming in from [facility] ED with leukocytosis and AMS was treated for UTI. Initially required bipap but was weaned off here. Labs concerning here for WBC 26, hemoglobin of 7s then 6.7, creatinine of 1.3. His maps were initially in the 50s. Physical exam showing pus from stoma, sat of central access of 81.8, and tachycardia are all concerning for septic shock picture. He is s/p 1L fluids, 1uPRBCs, was placed on vaso. Patient has known episodes of VT and had recurrent NSVT/VT when he first arrived started on lidocaine infusion with improvement. Of note he was previously on mexiletine 150 TID outpatient, metoprolol ER 25 qAM and 50 qPM. Previous history of thyrotoxicosis on amiodarone.    He was found to be bacteremic with recurrent serratia, on IV abx possibly r/t LVAD infection vs splenic abscess noted on CT 1/19. Afebrile since 3/1. Vasopressin weaned off 3/4, started on midodrine which has now been off since 3/8. LVAD without s/s of pump malfunction. Currently he opens his eyes however remains nonverbal and not following commands. His mental status is still not back to baseline. His VT has now subsided, last episode was noted on 3/12.  His hyponatremia and hyperkalemia are overall improving after receiving the following treatment insulin/D50 and lokelma.  Recently his hemoglobin has continued to drop, will be transfused with 1 uPRBC on 3/16. Overall fairly stable from HF perspective. 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 After family discussion on 3/17, family wishes to continues course of treatment and are wanting to proceed with PEG placement, will need to discuss date with GI.

## 2022-03-22 NOTE — PROGRESS NOTE ADULT - ASSESSMENT
JOINT, RICKY is a 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 s/p HM2 LVAD in 9/2017 at  (due to severe PAD) with TV ring, h/o mid-SB bleeding on VCE (negative on PUSH 6/15/21), subsequently maintained off AC, CKD 3, prior COVID-19 infection in 4/2020, recurrent syncope post LVAD s/p ILR, s/p prolonged complex hospitalization from 6/14-11/16/2021 initially admitted at that time with abdominal pain s/p VCE/PUSH 6/15/21, respiratory failure s/p trach, multiple infections, s/p cholecystotomy tube and SMA stent 10/2021, TEF, ultimately discharged to [facility] rehab and then returned to [facility] for trach decannulation 12/2, readmitted in 2/2022 with recurrent COVID-19 infection, initially in distributive shock with serratia marcesens, with declining condition and more lethargic/less responsive with hyponatremia, hyperkalemia, bacteremia with unknown source, multiple runs of VT (3/11) with NGT in place, GI consulted for PEG evaluation.    #. Evaluation for PEG placement 2/2 dysphagia c/b TEF with NGT in place  #. Hypernatremia  #. Recurrent bacteremia c/b serratia marcesens of unclear etiology  #. TEF - evaluated by ENT with plans to close when medically optimized and stable    Recommendations:  - No plan for EGD/PEG as patient is considerably high risk  - Continue with NGT feeds as option for long-term nutrition; otherwise may consider surgical or IR consult for PEG placement if PEG still desired  - Daily CBC, BMP, correct electrolytes  - Transfuse if Hgb < 8, consider cardiac history  - Ensure adequate hydration   - Rest of care per primary    Thank you for involving us in this patient's care. Please reach out to GI if any further questions or concerns. Signing off.    Note not final until Attending signs.    Aleksandra Kelly MD  Gastroenterology/Hepatology Fellow, PGY-V    NON-URGENT CONSULTS:  Please email [email or web address]  AT NIGHT AND ON WEEKENDS:  Contact on-call GI fellow via answering service ([phone]) from 5pm-8am and on weekends/holidays  MONDAY-FRIDAY 8AM-5PM:  Pager# [phone] ([facility])  GI Phone# [phone] ([facility])

## 2022-03-22 NOTE — PROGRESS NOTE ADULT - SUBJECTIVE AND OBJECTIVE BOX
RICKY JOINT  MRN#: [id number]  Subjective: pulmonary progress note  : acute hypercapnic respiratory failure . Septic shock , service rendered on 2002   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 s/p HM2 LVAD in 2017 at  (due to severe PAD) with TV ring, multiple GI bleeds, thus maintained off AC, CKD 3prior COVID-19 infection in 2020, recurrent syncope post LVAD s/p ILR, s/p prolonged complex hospitalization from -2021 initially admitted with abdominal pain complicated by GIB, respiratory failure s/p trach, multiple infections, s/p cholecystotomy tube and SMA stent 10/2021, ultimately discharged to [facility] rehab and then returned to [facility] for trach decannulation , readmitted in 2022 with recurrent COVID-19 infection, initially in distributive shock. Blood cultures were also positive for serratia marcescens which he has had in the past. seen by heart failure team patient is DNR/DNI  continue on 3 liter nasal canula flat Affect , hyperkalemia lokelma , runs of V-Tach. more stable  very weak , non verbal non communicative on tube feeding , leg booting, CT scan of chest mucous bulging  on Antibiotic and incentive spirometry  , patient receiving one unit of blood transfusion , sleeping at round .RT opacity infiltrate on Antibiotic  , condition is the same ,wound care note ap[preciated          (2022 22:20)    PAST MEDICAL & SURGICAL HISTORY:  CHF (congestive heart failure)    CAD (coronary artery disease)  Depression    Pleural effusion    History of 2019 novel coronavirus disease (COVID-19)  2020    Hemorrhoids    Bleeding hemorrhoids    Peripheral arterial disease    Claudication    BPH with urinary obstruction    ACC/AHA stage D systolic heart failure    Anticoagulation goal of INR 2.0 to 2.5    Falls    Clavicle fracture    CKD (chronic kidney disease), stage III    Iron deficiency anemia    H/O epistaxis    Vertigo    GI bleed    S/P TVR (tricuspid valve repair)    S/P ventricular assist device    S/P endoscopy    H/O tracheostomy        FAMILY HISTORY:    Social History:    Marital Status:  X    (   ) Single    (   )    (  )   Occupation: Retired   Lives with: (  ) alone  (  ) children  X spouse   (  ) parents  (  ) other    Substance Use (street drugs): X never used  (  ) other:  Tobacco Usage: X never smoked   (   ) former smoker   (   ) current smoker  (     ) pack year  (    ) last cigarette date  Alcohol Usage: Social         OBJECTIVE:  ICU Vital Signs Last 24 Hrs  T(C): 37.2 (01 Mar 2022 16:00), Max: 38.4 (01 Mar 2022 09:00)  T(F): 99 (01 Mar 2022 16:00), Max: 101.1 (01 Mar 2022 09:00)  HR: 109 (01 Mar 2022 18:00) (67 - 124)  BP: --  BP(mean): 86 (2022 20:00) (86 - 86)  ABP: 89/74 (01 Mar 2022 18:00) (75/63 - 160/85)  ABP(mean): 81 (01 Mar 2022 18:00) (68 - 139)  RR: 38 (01 Mar 2022 18:00) (5 - 40)  SpO2: 99% (01 Mar 2022 18:00) (94% - 100%)       @ 07: @ 07:00  --------------------------------------------------------  IN: 1060.7 mL / OUT: 1600 mL / NET: -539.3 mL     @ 07:01   @ 18:22  --------------------------------------------------------  IN: 677.8 mL / OUT: 455 mL / NET: 222.8 mL    PHYSICAL EXAM :Daily Height in cm: 177.8 (2022 21:20)  Elderly frail male on 3 liter nasal cannula    Daily Weight in k.7 (2022 21:20)  HEENT:     + NCAT  + EOMI  - Conjuctival edema   - Icterus   - Thrush   - ETT  - NGT/OGT  Neck:         + FROM RT IJ  JVD     - Nodes     - Masses    + Mid-line trachea   - Tracheostomy  Chest:           mild kyphosis   Lungs:          + CTA  + Rhonchi  + Rales    - Wheezing  + Decreased BS   - Dullness R L  Cardiac:       + S1 + S2    + RRR   - Irregular   - S3  - S4    - Murmurs   - Rub   - Hamman’s sign   Abdomen:    + BS     + Soft    + Non-tender  - Distended  - Organomegaly  - PEG Cholecystostomy tube in place   Extremities:   - Cyanosis U/L   - Clubbing  U/L  - LE/UE Edema   + Capillary refill    + Pulses   Neuro:        + Awake   +  Alert   - Confused   - Lethargic   - Sedated   - Generalized Weakness  Skin:        - Rashes    - Erythema   + Normal incisions   + IV sites intact + sacral and bilateral wound       HOSPITAL MEDICATIONS: All mediciations reviewed and analyzed  MEDICATIONS  (STANDING):  albumin human  5% IVPB 250 milliLiter(s) IV Intermittent once  cefepime   IVPB 2000 milliGRAM(s) IV Intermittent every 12 hours  chlorhexidine 2% Cloths 1 Application(s) Topical <User Schedule>  cholecalciferol 1000 Unit(s) Oral daily  dextrose 40% Gel 15 Gram(s) Oral once  dextrose 50% Injectable 25 Gram(s) IV Push once  gabapentin Solution 100 milliGRAM(s) Oral three times a day  glucagon  Injectable 1 milliGRAM(s) IntraMuscular once  insulin lispro (ADMELOG) corrective regimen sliding scale   SubCutaneous every 6 hours  magnesium sulfate  IVPB 1 Gram(s) IV Intermittent once  methimazole 10 milliGRAM(s) Oral daily  metoclopramide 10 milliGRAM(s) Oral four times a day  mexiletine 150 milliGRAM(s) Oral every 8 hours  mirtazapine 7.5 milliGRAM(s) Oral at bedtime  multivitamin 1 Tablet(s) Oral daily  pantoprazole  Injectable 40 milliGRAM(s) IV Push daily  potassium phosphate IVPB 15 milliMole(s) IV Intermittent once  senna 2 Tablet(s) Oral at bedtime  sertraline 100 milliGRAM(s) Oral daily  vancomycin  IVPB 750 milliGRAM(s) IV Intermittent every 24 hours  vasopressin Infusion 0.02 Unit(s)/Min (1.2 mL/Hr) IV Continuous <Continuous>    MEDICATIONS  (PRN):    LABS: All Lab data reviewed and analyzed                        8.7    6.42  )-----------( 108      ( 21 Mar 2022 06:29 )             27.5    03-21    133<L>  |  100  |  24<H>  ----------------------------<  95  3.8   |  22  |  0.76    Ca    9.4      21 Mar 2022 06:29  Phos  2.2     03-21  Mg     1.7     -    TPro  8.9<H>  /  Alb  3.1<L>  /  TBili  0.7  /  DBili  x   /  AST  49<H>  /  ALT  58<H>  /  AlkPhos  206<H>      Ca    9.8      19 Mar 2022 06:11  Phos  2.4     03-19  Mg     2.2     -19    TPro  9.5<H>  /  Alb  3.1<L>  /  TBili  0.6  /  DBili  x   /  AST  83<H>  /  ALT  85<H>  /  AlkPhos  242<H>      Ca    9.9      16 Mar 2022 06:37  Phos  2.7     03-15  Mg     1.9     -    TPro  9.4<H>  /  Alb  3.2<L>  /  TBili  0.4  /  DBili  x   /  AST  35  /  ALT  24  /  AlkPhos  207<H>               PTT - ( 01 Mar 2022 00:08 )  PTT:25.0 sec LIVER FUNCTIONS - ( 01 Mar 2022 17:38 )  Alb: 2.9 g/dL / Pro: 7.3 g/dL / ALK PHOS: 116 U/L / ALT: 30 U/L / AST: 29 U/L / GGT: x           RADIOLOGY: - Reviewed and analyzed

## 2022-03-22 NOTE — PROGRESS NOTE ADULT - PROBLEM SELECTOR PLAN 9
- currently being fed via keotube  - at this time patient is unable to tolerate PO diet. Family wishes to proceed with PEG placement.   - GI following, will need to determine date of PEG placement (of note will need to be booked with cardiac anesthesia)  - No absolute contra indication to placing a PEG from an cardiac perspective, but will need to clearance from GI from bleeding perspective

## 2022-03-22 NOTE — CONSULT NOTE ADULT - ASSESSMENT
Assessment/Plan: 66 year old male with a PMH of NICM with EF 10% with LVAD, h/o active T-E Fistula that ENT not planning repair for, h/o SB bleeding requiring GI intervention, and now s/p multiple infections (thought that LVAD  may be source) and treatment courses of ABX, s/p perc dawn (still in place), hypoxic resp failure s/p trach and currently getting NGT feeds. Per GI no plan for endoscopic PEG placement. IR consulted for PEG placement.      - Case and imaging discussed with IR attending Dr. Stephens. CT reviewed with Dr. Stephens. No safe window on CT scan noted to attempt PEG placement  - Would recommend surgical consult for possible surgical PEG placement  - Reconsult as needed and/or Call [phone] with any questions

## 2022-03-22 NOTE — PROGRESS NOTE ADULT - SUBJECTIVE AND OBJECTIVE BOX
Chief Complaint: Evaluating this 65 y/o M for iatrogenic hypothyroidism      Interval History: Free T4 improving off methimazole.    MEDICATIONS  (STANDING):  ascorbic acid 500 milliGRAM(s) Oral daily  chlorhexidine 2% Cloths 1 Application(s) Topical <User Schedule>  cholecalciferol 2000 Unit(s) Oral daily  insulin lispro (ADMELOG) corrective regimen sliding scale   SubCutaneous every 6 hours  meropenem  IVPB 1000 milliGRAM(s) IV Intermittent every 8 hours  metoprolol tartrate 50 milliGRAM(s) Oral two times a day  mexiletine 150 milliGRAM(s) Oral every 8 hours  multivitamin 1 Tablet(s) Oral daily  pantoprazole  Injectable 40 milliGRAM(s) IV Push two times a day  senna 2 Tablet(s) Oral at bedtime  sertraline 100 milliGRAM(s) Oral daily  thiamine 100 milliGRAM(s) Oral daily  traMADol 25 milliGRAM(s) Oral once  vancomycin  IVPB 500 milliGRAM(s) IV Intermittent every 12 hours    MEDICATIONS  (PRN):  polyethylene glycol 3350 17 Gram(s) Oral daily PRN Constipation      Allergies    Amiodarone Hydrochloride (Other (Severe))    Intolerances      Review of Systems: Unable to obtain from the patient due to condition      PHYSICAL EXAM:  VITALS: T(C): 36.3 (03-22-22 @ 10:02)  T(F): 97.3 (03-22-22 @ 10:02), Max: 98.6 (03-22-22 @ 09:30)  HR: 84 (03-22-22 @ 10:02) (84 - 117)  BP: 84/67 (03-22-22 @ 10:02) (84/67 - 102/73)  RR:  (12 - 18)  SpO2:  (100% - 100%)  Wt(kg): --  GENERAL: NAD at this time  EYES: EOMI, No proptosis  HEENT:  Atraumatic, Normocephalic, +dry membranes  RESPIRATORY: full excursion, non labored  CARDIOVASCULAR: Regular rhythm; normal S1/S2, no peripheral edema  GI: Soft, nontender, non distended, normal bowel sounds  SKIN: Warm and dry  PSYCH: flat affect      POCT Blood Glucose.: 100 mg/dL (03-22-22 @ 12:12)  POCT Blood Glucose.: 142 mg/dL (03-22-22 @ 08:07)  POCT Blood Glucose.: 130 mg/dL (03-22-22 @ 04:55)  POCT Blood Glucose.: 122 mg/dL (03-21-22 @ 23:48)  POCT Blood Glucose.: 120 mg/dL (03-21-22 @ 22:04)  POCT Blood Glucose.: 118 mg/dL (03-21-22 @ 16:48)  POCT Blood Glucose.: 115 mg/dL (03-21-22 @ 12:08)  POCT Blood Glucose.: 123 mg/dL (03-21-22 @ 06:02)  POCT Blood Glucose.: 109 mg/dL (03-21-22 @ 00:24)  POCT Blood Glucose.: 88 mg/dL (03-20-22 @ 18:17)  POCT Blood Glucose.: 92 mg/dL (03-20-22 @ 11:53)  POCT Blood Glucose.: 90 mg/dL (03-20-22 @ 04:19)  POCT Blood Glucose.: 96 mg/dL (03-20-22 @ 00:26)  POCT Blood Glucose.: 111 mg/dL (03-19-22 @ 16:42)        03-22    133<L>  |  99  |  26<H>  ----------------------------<  143<H>  4.0   |  27  |  0.69    EGFR if : x   EGFR if non : x     Ca    9.1      03-22  Mg     1.6     03-22  Phos  2.1     03-22    TPro  9.0<H>  /  Alb  2.9<L>  /  TBili  0.5  /  DBili  0.1  /  AST  101<H>  /  ALT  68<H>  /  AlkPhos  315<H>  03-21        Thyroid Function Tests:  03-22 @ 10:58 TSH -- FreeT4 0.8 T3 63 Anti TPO -- Anti Thyroglobulin Ab -- TSI --  03-16 @ 09:25 TSH -- FreeT4 0.6 T3 -- Anti TPO -- Anti Thyroglobulin Ab -- TSI --

## 2022-03-22 NOTE — PROGRESS NOTE ADULT - PROBLEM SELECTOR PLAN 3
Initially treated for serratia bacteremia with cefepime- source likely LVAD  CT showed abdominal aortic thrombus (?infected) and a 2.6cm splenic lesion (?abscess)  ID following- continue vancomycin/meropenem  If blood cultures are finalized from 3/17 as negative will discontinue Vancomycin and complete a course of Meropenem x 7 days to de-escalate back to cover prior serratia bacteremia /driveline infection

## 2022-03-23 NOTE — PROGRESS NOTE ADULT - ASSESSMENT
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    S/p prolonged complex hospitalization from 6/14-11/16/2021 initially admitted with abdominal pain complicated by GIB, respiratory failure s/p trach, multiple infections, s/p cholecystotomy tube and SMA stent 10/2021, ultimately discharged to [facility] rehab and then returned to [facility] for trach decannulation 12/2. The patient had a recent admission with COVID 19 reinfection and distributive shock. That admission he had blood culture positive for serratia marcescens (discharged on levaquin).  He was treated with MAB, remdesivir, and steroids. He had recurrent VT and was started mexiletine.     Now coming in from [facility] ED with leukocytosis and AMS was treated for UTI. Initially required bipap but was weaned off here. Labs concerning here for WBC 26, hemoglobin of 7s then 6.7, creatinine of 1.3. His maps were initially in the 50s. Physical exam showing pus from stoma, sat of central access of 81.8, and tachycardia are all concerning for septic shock picture. He is s/p 1L fluids, 1uPRBCs, was placed on vaso. Patient has known episodes of VT and had recurrent NSVT/VT when he first arrived started on lidocaine infusion with improvement. Of note he was previously on mexiletine 150 TID outpatient, metoprolol ER 25 qAM and 50 qPM. Previous history of thyrotoxicosis on amiodarone.    He was found to be bacteremic with recurrent serratia, on IV abx possibly r/t LVAD infection vs splenic abscess noted on CT 1/19. Afebrile since 3/1. Vasopressin weaned off 3/4, started on midodrine which has now been off since 3/8. LVAD without s/s of pump malfunction. Currently he opens his eyes however remains nonverbal and not following commands. His mental status is still not back to baseline. His VT has now subsided, last episode was noted on 3/12.  His hyponatremia and hyperkalemia are overall improving after receiving the following treatment insulin/D50 and lokelma.  Recently his hemoglobin has continued to drop, will be transfused with 1 uPRBC on 3/16. Overall fairly stable from HF perspective. 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 After family discussion on 3/17, family wishes to continues course of treatment and are wanting to proceed with PEG placement, will need to discuss date with GI.

## 2022-03-23 NOTE — PROGRESS NOTE ADULT - SUBJECTIVE AND OBJECTIVE BOX
Subjective:  Remains nonverbal. Patent seems awake and alert.     Medications:  ascorbic acid 500 milliGRAM(s) Oral daily  chlorhexidine 2% Cloths 1 Application(s) Topical <User Schedule>  cholecalciferol 2000 Unit(s) Oral daily  insulin lispro (ADMELOG) corrective regimen sliding scale   SubCutaneous every 6 hours  magnesium sulfate  IVPB 2 Gram(s) IV Intermittent once  meropenem  IVPB 1000 milliGRAM(s) IV Intermittent every 8 hours  metoprolol tartrate 50 milliGRAM(s) Oral two times a day  mexiletine 150 milliGRAM(s) Oral every 8 hours  multivitamin 1 Tablet(s) Oral daily  pantoprazole  Injectable 40 milliGRAM(s) IV Push two times a day  polyethylene glycol 3350 17 Gram(s) Oral daily PRN  senna 2 Tablet(s) Oral at bedtime  sertraline 100 milliGRAM(s) Oral daily  sodium chloride 0.9% Bolus 250 milliLiter(s) IV Bolus once  thiamine 100 milliGRAM(s) Oral daily  traMADol 25 milliGRAM(s) Oral once    Vitals:  T(C): 37.1 (22 @ 15:20), Max: 37.1 (22 @ 15:20)  HR: 116 (22 @ 15:20) (107 - 129)  BP: 92/71 (22 @ 15:20) (83/63 - 110/85)  BP(mean): 72 (22 @ 08:05) (69 - 84)  RR: 18 (22 @ 15:20) (16 - 20)  SpO2: 98% (22 @ 15:20) (98% - 100%)    Daily     Daily Weight in k.1 (23 Mar 2022 08:23)        I&O's Summary    22 Mar 2022 07:  -  23 Mar 2022 07:00  --------------------------------------------------------  IN: 1890 mL / OUT: 570 mL / NET: 1320 mL    23 Mar 2022 07:01  -  23 Mar 2022 17:17  --------------------------------------------------------  IN: 0 mL / OUT: 350 mL / NET: -350 mL        Physical Exam:  Appearance: No Acute Distress, cachectic and debilitated.   HEENT: JVP non elevated  Cardiovascular: VAD hum  Respiratory: Clear to auscultation bilaterally  Gastrointestinal: Soft, Non-tender, non-distended	  Skin: no skin lesions  Neurologic: Non-focal  Extremities: No LE edema, warm. Diffuse muscle atrophy.   Psychiatry: Alert and awake.     VAD Interrogation  Type: HM 3  No VAD alarms  Speed 9200 rpm  Flow 4.9 lpm  Power 5.3 gallegos  PI 6.3  Assessment of driveline exit: driveline dressing clean/dry/intact      Labs:                        8.1    9.30  )-----------( 149      ( 23 Mar 2022 04:22 )             25.2     03-23    132<L>  |  97  |  31<H>  ----------------------------<  108<H>  4.0   |  24  |  0.64    Ca    9.3      23 Mar 2022 04:22  Phos  2.3     03-  Mg     1.5         TPro  9.1<H>  /  Alb  3.0<L>  /  TBili  0.4  /  DBili  x   /  AST  35  /  ALT  42  /  AlkPhos  220<H>  23                Lactate Dehydrogenase, Serum: 252 U/L ( @ 06:29)

## 2022-03-23 NOTE — PROGRESS NOTE ADULT - SUBJECTIVE AND OBJECTIVE BOX
RICKY JOINT  MRN#: [id number]  Subjective: pulmonary progress note  : acute hypercapnic respiratory failure . Septic shock , service rendered on 2002   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 s/p HM2 LVAD in 2017 at  (due to severe PAD) with TV ring, multiple GI bleeds, thus maintained off AC, CKD 3prior COVID-19 infection in 2020, recurrent syncope post LVAD s/p ILR, s/p prolonged complex hospitalization from -2021 initially admitted with abdominal pain complicated by GIB, respiratory failure s/p trach, multiple infections, s/p cholecystotomy tube and SMA stent 10/2021, ultimately discharged to [facility] rehab and then returned to [facility] for trach decannulation , readmitted in 2022 with recurrent COVID-19 infection, initially in distributive shock. Blood cultures were also positive for serratia marcescens which he has had in the past. seen by heart failure team patient is DNR/DNI  continue on 3 liter nasal canula flat Affect , hyperkalemia lokelma , runs of V-Tach. more stable  very weak , non verbal non communicative on tube feeding , leg booting, CT scan of chest mucous bulging  on Antibiotic and incentive spirometry  , patient receiving one unit of blood transfusion , sleeping at round .RT opacity infiltrate on Antibiotic  , condition is the same ,wound care note appreciated , NG tube was replaced continue on tube feeding .         (2022 22:20)    PAST MEDICAL & SURGICAL HISTORY:  CHF (congestive heart failure)    CAD (coronary artery disease)  Depression    Pleural effusion    History of 2019 novel coronavirus disease (COVID-19)  2020    Hemorrhoids    Bleeding hemorrhoids    Peripheral arterial disease    Claudication    BPH with urinary obstruction    ACC/AHA stage D systolic heart failure    Anticoagulation goal of INR 2.0 to 2.5    Falls    Clavicle fracture    CKD (chronic kidney disease), stage III    Iron deficiency anemia    H/O epistaxis    Vertigo    GI bleed    S/P TVR (tricuspid valve repair)    S/P ventricular assist device    S/P endoscopy    H/O tracheostomy        FAMILY HISTORY:    Social History:    Marital Status:  X    (   ) Single    (   )    (  )   Occupation: Retired   Lives with: (  ) alone  (  ) children  X spouse   (  ) parents  (  ) other    Substance Use (street drugs): X never used  (  ) other:  Tobacco Usage: X never smoked   (   ) former smoker   (   ) current smoker  (     ) pack year  (    ) last cigarette date  Alcohol Usage: Social         OBJECTIVE:  ICU Vital Signs Last 24 Hrs  T(C): 37.2 (01 Mar 2022 16:00), Max: 38.4 (01 Mar 2022 09:00)  T(F): 99 (01 Mar 2022 16:00), Max: 101.1 (01 Mar 2022 09:00)  HR: 109 (01 Mar 2022 18:00) (67 - 124)  BP: --  BP(mean): 86 (2022 20:00) (86 - 86)  ABP: 89/74 (01 Mar 2022 18:00) (75/63 - 160/85)  ABP(mean): 81 (01 Mar 2022 18:00) (68 - 139)  RR: 38 (01 Mar 2022 18:00) (5 - 40)  SpO2: 99% (01 Mar 2022 18:00) (94% - 100%)       @ 07: @ 07:00  --------------------------------------------------------  IN: 1060.7 mL / OUT: 1600 mL / NET: -539.3 mL     @ 07:01   @ 18:22  --------------------------------------------------------  IN: 677.8 mL / OUT: 455 mL / NET: 222.8 mL    PHYSICAL EXAM :Daily Height in cm: 177.8 (2022 21:20)  Elderly frail male on 3 liter nasal cannula    Daily Weight in k.7 (2022 21:20)  HEENT:     + NCAT  + EOMI  - Conjuctival edema   - Icterus   - Thrush   - ETT  - NGT/OGT  Neck:         + FROM RT IJ  JVD     - Nodes     - Masses    + Mid-line trachea   - Tracheostomy  Chest:           mild kyphosis   Lungs:          + CTA  + Rhonchi  + Rales    - Wheezing  + Decreased BS   - Dullness R L  Cardiac:       + S1 + S2    + RRR   - Irregular   - S3  - S4    - Murmurs   - Rub   - Hamman’s sign   Abdomen:    + BS     + Soft    + Non-tender  - Distended  - Organomegaly  - PEG Cholecystostomy tube in place   Extremities:   - Cyanosis U/L   - Clubbing  U/L  - LE/UE Edema   + Capillary refill    + Pulses   Neuro:        + Awake   +  Alert   - Confused   - Lethargic   - Sedated   - Generalized Weakness  Skin:        - Rashes    - Erythema   + Normal incisions   + IV sites intact + sacral and bilateral wound       HOSPITAL MEDICATIONS: All mediciations reviewed and analyzed  MEDICATIONS  (STANDING):  albumin human  5% IVPB 250 milliLiter(s) IV Intermittent once  cefepime   IVPB 2000 milliGRAM(s) IV Intermittent every 12 hours  chlorhexidine 2% Cloths 1 Application(s) Topical <User Schedule>  cholecalciferol 1000 Unit(s) Oral daily  dextrose 40% Gel 15 Gram(s) Oral once  dextrose 50% Injectable 25 Gram(s) IV Push once  gabapentin Solution 100 milliGRAM(s) Oral three times a day  glucagon  Injectable 1 milliGRAM(s) IntraMuscular once  insulin lispro (ADMELOG) corrective regimen sliding scale   SubCutaneous every 6 hours  magnesium sulfate  IVPB 1 Gram(s) IV Intermittent once  methimazole 10 milliGRAM(s) Oral daily  metoclopramide 10 milliGRAM(s) Oral four times a day  mexiletine 150 milliGRAM(s) Oral every 8 hours  mirtazapine 7.5 milliGRAM(s) Oral at bedtime  multivitamin 1 Tablet(s) Oral daily  pantoprazole  Injectable 40 milliGRAM(s) IV Push daily  potassium phosphate IVPB 15 milliMole(s) IV Intermittent once  senna 2 Tablet(s) Oral at bedtime  sertraline 100 milliGRAM(s) Oral daily  vancomycin  IVPB 750 milliGRAM(s) IV Intermittent every 24 hours  vasopressin Infusion 0.02 Unit(s)/Min (1.2 mL/Hr) IV Continuous <Continuous>    MEDICATIONS  (PRN):    LABS: All Lab data reviewed and analyzed                        8.1    9.30  )-----------( 149      ( 23 Mar 2022 04:22 )  03-    132<L>  |  97  |  31<H>  ----------------------------<  108<H>  4.0   |  24  |  0.64    Ca    9.3      23 Mar 2022 04:22  Phos  2.3     03-23  Mg     1.5     -    TPro  9.1<H>  /  Alb  3.0<L>  /  TBili  0.4  /  DBili  x   /  AST  35  /  ALT  42  /  AlkPhos  220<H>                 25.2    Ca    9.4      21 Mar 2022 06:29  Phos  2.2     03-21  Mg     1.7     -    TPro  8.9<H>  /  Alb  3.1<L>  /  TBili  0.7  /  DBili  x   /  AST  49<H>  /  ALT  58<H>  /  AlkPhos  206<H>      Ca    9.8      19 Mar 2022 06:11  Phos  2.4     03-19  Mg     2.2     -    TPro  9.5<H>  /  Alb  3.1<L>  /  TBili  0.6  /  DBili  x   /  AST  83<H>  /  ALT  85<H>  /  AlkPhos  242<H>      Ca    9.9      16 Mar 2022 06:37  Phos  2.7     03-15  Mg     1.9     -16    TPro  9.4<H>  /  Alb  3.2<L>  /  TBili  0.4  /  DBili  x   /  AST  35  /  ALT  24  /  AlkPhos  207<H>  -             PTT - ( 01 Mar 2022 00:08 )  PTT:25.0 sec LIVER FUNCTIONS - ( 01 Mar 2022 17:38 )  Alb: 2.9 g/dL / Pro: 7.3 g/dL / ALK PHOS: 116 U/L / ALT: 30 U/L / AST: 29 U/L / GGT: x           RADIOLOGY: - Reviewed and analyzed

## 2022-03-23 NOTE — CHART NOTE - NSCHARTNOTEFT_GEN_A_CORE
Medicine PA    Called by RN for NG tube insertion. Pt is a 66y old Male admitted for AMS. NG tube order noted on chart.    T(C): 36.7 (03-23-22 @ 00:51), Max: 37 (03-22-22 @ 09:30)  T(F): 98.1 (03-23-22 @ 00:51), Max: 98.6 (03-22-22 @ 09:30)  HR: 112 (03-23-22 @ 00:51) (84 - 117)  BP: 101/60 (03-23-22 @ 00:51) (84/67 - 107/73)  RR: 16 (03-23-22 @ 00:51) (12 - 20)  SpO2: 100% (03-23-22 @ 00:51) (100% - 100%)  Wt(kg): --    NGT inserted into left nare. Pt tolerated procedure well. Placement verified via auscultation. CXR ordered to confirm placement. Prelim read below:  < from: Xray Chest 1 View- PORTABLE-Urgent (Xray Chest 1 View- PORTABLE-Urgent .) (03.23.22 @ 02:21) >    INTERPRETATION:  NGT in the stomach      CLAUDIA Hernandez-C  Department of Medicine  Spectra [phone]

## 2022-03-23 NOTE — PROGRESS NOTE ADULT - PROBLEM SELECTOR PLAN 3
Initially treated for serratia bacteremia with cefepime- source likely LVAD  CT showed abdominal aortic thrombus (?infected) and a 2.6cm splenic lesion (?abscess)  ID following- continue meropenem x 7 days then revert back to cefepime to cover prior serratia bacteremia /driveline infection

## 2022-03-23 NOTE — PROGRESS NOTE ADULT - ASSESSMENT
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 s/p HM2 LVAD in 9/2017 at  (due to severe PAD) with TV ring, multiple GI bleeds, thus maintained off AC, prior COVID-19 infection in 4/2020, recurrent syncope post LVAD s/p ILR, prolonged complex hospitalization from 6/14-11/16/2021 with GIB, respiratory failure s/p trach decanulated 12/2021, multiple infections, s/p cholecystotomy tube and SMA stent 10/2021, recurrent COVID 19 reinfection s/p MAB/remdesivir/steroids and distributive shock with serratia bacteremia, recurrent VT on mexiletine transferred from [facility] ED with metabolic encephalopathy, hypercarbic respiratory failure weaned off bipap, septic shock requiring pressors (now on midodrine) with serratia bacteremia on cefepime, intermittent h/h drop s/p total 3units prbc (last 3/6), episodes of recurrent VT s/p lidocaine gtt now on mexiletine. Receiving NGT feedings, needs PEG

## 2022-03-23 NOTE — PROGRESS NOTE ADULT - PROBLEM SELECTOR PLAN 1
- 2/28 BCx + serratia/GNR  - WBC overall improving now with broaden antibiotics.   - CT C/A/P 3/18 concerning for mucous plugging with possible aspiration PNA  - palliative care following, family meeting held on 3/17. At this time the family wishes to continue with course of treatment and would like PEG to be placed. ID has cleared patient to undergo PEG. As per GI team patient is high risk to undergo procedure.  PLan for family meeting on Friday 3/24 at 3pm.

## 2022-03-23 NOTE — PROGRESS NOTE ADULT - PROBLEM SELECTOR PLAN 2
Now hyponatremic- NGT free water boluses and IVF d/c'd 3/21  IVF with NS as also borderline hypotensive   Daily BMP

## 2022-03-23 NOTE — PROGRESS NOTE ADULT - PROBLEM SELECTOR PLAN 9
- currently being fed via keotube  - at this time patient is unable to tolerate PO diet. Family wishes to proceed with PEG placement.   - GI following, will need to determine date of PEG placement (of note will need to be booked with cardiac anesthesia)  - No absolute contraindication to placing a PEG from an cardiac perspective. GI and IR services recommend surgical opinion.   PLan for family meeting again on Friday.

## 2022-03-23 NOTE — PROGRESS NOTE ADULT - SUBJECTIVE AND OBJECTIVE BOX
Mich Krueger MD    Patient is a 66y old  Male who presents with a chief complaint of Septic shock (23 Mar 2022 11:29)        SUBJECTIVE / OVERNIGHT EVENTS: NGT dislodged overnight, replaced by PA  TELEMETRY: -130      MEDICATIONS  (STANDING):  ascorbic acid 500 milliGRAM(s) Oral daily  chlorhexidine 2% Cloths 1 Application(s) Topical <User Schedule>  cholecalciferol 2000 Unit(s) Oral daily  insulin lispro (ADMELOG) corrective regimen sliding scale   SubCutaneous every 6 hours  meropenem  IVPB 1000 milliGRAM(s) IV Intermittent every 8 hours  metoprolol tartrate 50 milliGRAM(s) Oral two times a day  mexiletine 150 milliGRAM(s) Oral every 8 hours  multivitamin 1 Tablet(s) Oral daily  pantoprazole  Injectable 40 milliGRAM(s) IV Push two times a day  senna 2 Tablet(s) Oral at bedtime  sertraline 100 milliGRAM(s) Oral daily  sodium chloride 0.9% Bolus 250 milliLiter(s) IV Bolus once  thiamine 100 milliGRAM(s) Oral daily  traMADol 25 milliGRAM(s) Oral once    MEDICATIONS  (PRN):  polyethylene glycol 3350 17 Gram(s) Oral daily PRN Constipation      Vital Signs Last 24 Hrs  T(C): 37.1 (23 Mar 2022 15:20), Max: 37.1 (23 Mar 2022 15:20)  T(F): 98.7 (23 Mar 2022 15:20), Max: 98.7 (23 Mar 2022 15:20)  HR: 116 (23 Mar 2022 15:20) (107 - 129)  BP: 92/71 (23 Mar 2022 15:20) (83/63 - 110/85)  BP(mean): 72 (23 Mar 2022 08:05) (69 - 84)  RR: 18 (23 Mar 2022 15:20) (16 - 20)  SpO2: 98% (23 Mar 2022 15:20) (98% - 100%)  CAPILLARY BLOOD GLUCOSE      POCT Blood Glucose.: 124 mg/dL (23 Mar 2022 11:42)  POCT Blood Glucose.: 118 mg/dL (23 Mar 2022 07:53)  POCT Blood Glucose.: 115 mg/dL (23 Mar 2022 06:05)  POCT Blood Glucose.: 88 mg/dL (22 Mar 2022 22:59)  POCT Blood Glucose.: 111 mg/dL (22 Mar 2022 17:00)    I&O's Summary    22 Mar 2022 07:01  -  23 Mar 2022 07:00  --------------------------------------------------------  IN: 1890 mL / OUT: 570 mL / NET: 1320 mL    23 Mar 2022 07:01  -  23 Mar 2022 15:50  --------------------------------------------------------  IN: 0 mL / OUT: 350 mL / NET: -350 mL          PHYSICAL EXAM  GENERAL: NAD, cachetic  HEAD:  Atraumatic, normocephalic; +NGT  EYES: EOMI, PERRLA, conjunctiva and sclera clear  CHEST/LUNG: Clear to auscultation bilaterally; no wheezes  HEART: +LVAD hum  ABDOMEN: Soft, nontender, nondistended; bowel sounds present; R sided driveline with clean dressing in place; R percut dawn drain with bilious drainage  EXTREMITIES:  2+ peripheral pulses; no clubbing, cyanosis, or edema  PSYCH: AAOx0-1, opens eyes to name      LABS:                        8.1    9.30  )-----------( 149      ( 23 Mar 2022 04:22 )             25.2     03-23    132<L>  |  97  |  31<H>  ----------------------------<  108<H>  4.0   |  24  |  0.64    Ca    9.3      23 Mar 2022 04:22  Phos  2.3     03-23  Mg     1.5     03-23    TPro  9.1<H>  /  Alb  3.0<L>  /  TBili  0.4  /  DBili  x   /  AST  35  /  ALT  42  /  AlkPhos  220<H>  03-23                RADIOLOGY & ADDITIONAL TESTS:    Imaging Personally Reviewed:  Consultant(s) Notes Reviewed:    Care Discussed with Consultants/Other Providers:

## 2022-03-24 NOTE — PROGRESS NOTE ADULT - PROBLEM SELECTOR PLAN 5
Intermittent H/H drops requiring transfusions  Transfusing 2 units PRBCs today for Hgb 5.5  High risk for EGD/colonoscopy  Daily CBC

## 2022-03-24 NOTE — PROGRESS NOTE ADULT - PROBLEM SELECTOR PLAN 1
Suspect secondary to infection with diffuse cerebral dysfunction.   Per family meeting 3/17 continue current treatment and obtain PEG if able  Plan for repeat family meeting tomorrow to readdress treatment plan   They are reportedly aware of poor prognosis and functional quadriplegia  DNR/DNI

## 2022-03-24 NOTE — PROGRESS NOTE ADULT - ATTENDING COMMENTS
Tachycardic since yesterday. H/H with acute drop.  ?Melenas as per primary team.   Pt is in no cute distress. Abdom is soft.  No VAD alarms.  Has known h/o GI bleed with extensive work up and endoscopies.   Prognosis remains poor. Family meeting scheduled for tomorrow.  Agree with 2u PRBC transfusion.

## 2022-03-24 NOTE — PROGRESS NOTE ADULT - SUBJECTIVE AND OBJECTIVE BOX
Subjective: Patient seen and examined resting in bed. This morning was noted to be hypotensive with MAPs in the 50s and Hgb dropped to 5.5. Will be transfused with 1uPRBC.     Medications:  ascorbic acid 500 milliGRAM(s) Oral daily  chlorhexidine 2% Cloths 1 Application(s) Topical <User Schedule>  cholecalciferol 2000 Unit(s) Oral daily  insulin lispro (ADMELOG) corrective regimen sliding scale   SubCutaneous every 6 hours  meropenem  IVPB 1000 milliGRAM(s) IV Intermittent every 8 hours  metoprolol tartrate 50 milliGRAM(s) Oral two times a day  mexiletine 150 milliGRAM(s) Oral every 8 hours  multivitamin 1 Tablet(s) Oral daily  pantoprazole  Injectable 40 milliGRAM(s) IV Push two times a day  polyethylene glycol 3350 17 Gram(s) Oral daily PRN  senna 2 Tablet(s) Oral at bedtime  sertraline 100 milliGRAM(s) Oral daily  sodium chloride 0.9% Bolus 250 milliLiter(s) IV Bolus once  thiamine 100 milliGRAM(s) Oral daily  traMADol 25 milliGRAM(s) Oral once      Vitals:  Vital Signs Last 24 Hours  T(C): 36.4 (22 @ 12:10), Max: 37.1 (22 @ 15:20)  HR: 101 (22 @ 12:10) (91 - 129)  BP: 82/52 (22 @ 12:10) (82/52 - 96/70)  RR: 18 (22 @ 12:10) (18 - 20)  SpO2: 99% (22 @ 12:10) (98% - 100%)    Weight in k.7 ( @ 07:51)    I&O's Summary    23 Mar 2022 07:01  -  24 Mar 2022 07:00  --------------------------------------------------------  IN: 850 mL / OUT: 1870 mL / NET: -1020 mL        Physical Exam  General: No distress. Comfortable.  Neck: Neck supple. JVP not elevated. No masses  Chest: Clear to auscultation bilaterally  CV: +VAD hum  Abdomen: Soft, non-distended, non-tender  Neurology: awake, remains non verbal and not following commands     LVAD Interrogation  VAD TYPE HM 2  Speed 9200  Flow 5.3  Power 5.6  PI  6.1  Assessment of driveline exit site: driveline dressing c/d/i  Programming changes: no changes made    Labs:                        5.5    7.79  )-----------( 135      ( 24 Mar 2022 08:25 )             17.0         133<L>  |  100  |  45<H>  ----------------------------<  149<H>  4.1   |  26  |  0.66    Ca    8.8      24 Mar 2022 06:30  Phos  2.3       Mg     2.0         TPro  9.1<H>  /  Alb  3.0<L>  /  TBili  0.4  /  DBili  x   /  AST  35  /  ALT  42  /  AlkPhos  220<H>

## 2022-03-24 NOTE — PROGRESS NOTE ADULT - ASSESSMENT
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 s/p HM2 LVAD in 9/2017 at  (due to severe PAD) with TV ring, multiple GI bleeds, thus maintained off AC, prior COVID-19 infection in 4/2020, recurrent syncope post LVAD s/p ILR, prolonged complex hospitalization from 6/14-11/16/2021 with GIB, respiratory failure s/p trach decanulated 12/2021, multiple infections, s/p cholecystotomy tube and SMA stent 10/2021, recurrent COVID 19 reinfection s/p MAB/remdesivir/steroids and distributive shock with serratia bacteremia, recurrent VT on mexiletine transferred from [facility] ED with metabolic encephalopathy, hypercarbic respiratory failure weaned off bipap, septic shock requiring pressors (now on midodrine) with serratia bacteremia on cefepime, intermittent h/h drop s/p total 3units prbc (last 3/6), episodes of recurrent VT s/p lidocaine gtt now on mexiletine. Receiving NGT feedings, per GI/IR too high risk for PEG placement

## 2022-03-24 NOTE — PROGRESS NOTE ADULT - SUBJECTIVE AND OBJECTIVE BOX
Mich Krueger MD    Patient is a 66y old  Male who presents with a chief complaint of Septic shock (23 Mar 2022 17:16)        SUBJECTIVE / OVERNIGHT EVENTS: Patient with low Hgb this am and RN reporting dark stools.   TELEMETRY: SB/SR 55-60's PVCs      MEDICATIONS  (STANDING):  ascorbic acid 500 milliGRAM(s) Oral daily  chlorhexidine 2% Cloths 1 Application(s) Topical <User Schedule>  cholecalciferol 2000 Unit(s) Oral daily  insulin lispro (ADMELOG) corrective regimen sliding scale   SubCutaneous every 6 hours  meropenem  IVPB 1000 milliGRAM(s) IV Intermittent every 8 hours  metoprolol tartrate 50 milliGRAM(s) Oral two times a day  mexiletine 150 milliGRAM(s) Oral every 8 hours  multivitamin 1 Tablet(s) Oral daily  pantoprazole  Injectable 40 milliGRAM(s) IV Push two times a day  senna 2 Tablet(s) Oral at bedtime  sertraline 100 milliGRAM(s) Oral daily  sodium chloride 0.9% Bolus 250 milliLiter(s) IV Bolus once  thiamine 100 milliGRAM(s) Oral daily  traMADol 25 milliGRAM(s) Oral once    MEDICATIONS  (PRN):  polyethylene glycol 3350 17 Gram(s) Oral daily PRN Constipation      Vital Signs Last 24 Hrs  T(C): 36.3 (24 Mar 2022 08:08), Max: 37.1 (23 Mar 2022 15:20)  T(F): 97.4 (24 Mar 2022 08:08), Max: 98.7 (23 Mar 2022 15:20)  HR: 91 (24 Mar 2022 08:08) (91 - 129)  BP: 95/66 (24 Mar 2022 04:10) (85/62 - 96/70)  BP(mean): 68 (24 Mar 2022 08:08) (68 - 76)  RR: 18 (24 Mar 2022 08:08) (18 - 20)  SpO2: 100% (24 Mar 2022 08:08) (98% - 100%)  CAPILLARY BLOOD GLUCOSE      POCT Blood Glucose.: 130 mg/dL (24 Mar 2022 06:49)  POCT Blood Glucose.: 113 mg/dL (23 Mar 2022 23:55)  POCT Blood Glucose.: 131 mg/dL (23 Mar 2022 17:00)  POCT Blood Glucose.: 124 mg/dL (23 Mar 2022 11:42)    I&O's Summary    23 Mar 2022 07:01  -  24 Mar 2022 07:00  --------------------------------------------------------  IN: 850 mL / OUT: 1870 mL / NET: -1020 mL          PHYSICAL EXAM  GENERAL: NAD, cachetic  HEAD:  Atraumatic, normocephalic; +NGT  EYES: EOMI, PERRLA, conjunctiva and sclera clear  CHEST/LUNG: Clear to auscultation bilaterally; no wheezes  HEART: +LVAD hum  ABDOMEN: Soft, nontender, nondistended; bowel sounds present; R sided driveline with clean dressing in place; R percut dawn drain with bilious drainage  EXTREMITIES:  2+ peripheral pulses; no clubbing, cyanosis, or edema  PSYCH: AAOx0-1, opens eyes to name      LABS:                        5.5    7.79  )-----------( 135      ( 24 Mar 2022 08:25 )             17.0     03-24    133<L>  |  100  |  45<H>  ----------------------------<  149<H>  4.1   |  26  |  0.66    Ca    8.8      24 Mar 2022 06:30  Phos  2.3     03-23  Mg     2.0     03-24    TPro  9.1<H>  /  Alb  3.0<L>  /  TBili  0.4  /  DBili  x   /  AST  35  /  ALT  42  /  AlkPhos  220<H>  03-23                RADIOLOGY & ADDITIONAL TESTS:    Imaging Personally Reviewed:  Consultant(s) Notes Reviewed:    Care Discussed with Consultants/Other Providers:

## 2022-03-24 NOTE — PROGRESS NOTE ADULT - PROBLEM SELECTOR PLAN 9
- currently being fed via keotube  - at this time patient is unable to tolerate PO diet. Family wishes to proceed with PEG placement.   - GI following, will need to determine date of PEG placement (of note will need to be booked with cardiac anesthesia)  - No absolute contraindication to placing a PEG from an cardiac perspective however as stated above GI and IR state patient is to high risk.  - Family meeting as stated above

## 2022-03-24 NOTE — PROGRESS NOTE ADULT - SUBJECTIVE AND OBJECTIVE BOX
RICKY JOINT  MRN#: [id number]  Subjective: pulmonary progress note  : acute hypercapnic respiratory failure . Septic shock , service rendered on 2002   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 s/p HM2 LVAD in 2017 at  (due to severe PAD) with TV ring, multiple GI bleeds, thus maintained off AC, CKD 3prior COVID-19 infection in 2020, recurrent syncope post LVAD s/p ILR, s/p prolonged complex hospitalization from -2021 initially admitted with abdominal pain complicated by GIB, respiratory failure s/p trach, multiple infections, s/p cholecystotomy tube and SMA stent 10/2021, ultimately discharged to [facility] rehab and then returned to [facility] for trach decannulation , readmitted in 2022 with recurrent COVID-19 infection, initially in distributive shock. Blood cultures were also positive for serratia marcescens which he has had in the past. seen by heart failure team patient is DNR/DNI  continue on 3 liter nasal canula flat Affect , hyperkalemia lokelma , runs of V-Tach. more stable  very weak , non verbal non communicative on tube feeding , leg booting, CT scan of chest mucous bulging  on Antibiotic and incentive spirometry  , patient receiving one unit of blood transfusion , sleeping at round .RT opacity infiltrate on Antibiotic  , condition is the same ,wound care note appreciated , NG tube was replaced continue on tube feeding .         (2022 22:20)    PAST MEDICAL & SURGICAL HISTORY:  CHF (congestive heart failure)    CAD (coronary artery disease)  Depression    Pleural effusion    History of 2019 novel coronavirus disease (COVID-19)  2020    Hemorrhoids    Bleeding hemorrhoids    Peripheral arterial disease    Claudication    BPH with urinary obstruction    ACC/AHA stage D systolic heart failure    Anticoagulation goal of INR 2.0 to 2.5    Falls    Clavicle fracture    CKD (chronic kidney disease), stage III    Iron deficiency anemia    H/O epistaxis    Vertigo    GI bleed    S/P TVR (tricuspid valve repair)    S/P ventricular assist device    S/P endoscopy    H/O tracheostomy        FAMILY HISTORY:    Social History:    Marital Status:  X    (   ) Single    (   )    (  )   Occupation: Retired   Lives with: (  ) alone  (  ) children  X spouse   (  ) parents  (  ) other    Substance Use (street drugs): X never used  (  ) other:  Tobacco Usage: X never smoked   (   ) former smoker   (   ) current smoker  (     ) pack year  (    ) last cigarette date  Alcohol Usage: Social         OBJECTIVE:  ICU Vital Signs Last 24 Hrs  T(C): 37.2 (01 Mar 2022 16:00), Max: 38.4 (01 Mar 2022 09:00)  T(F): 99 (01 Mar 2022 16:00), Max: 101.1 (01 Mar 2022 09:00)  HR: 109 (01 Mar 2022 18:00) (67 - 124)  BP: --  BP(mean): 86 (2022 20:00) (86 - 86)  ABP: 89/74 (01 Mar 2022 18:00) (75/63 - 160/85)  ABP(mean): 81 (01 Mar 2022 18:00) (68 - 139)  RR: 38 (01 Mar 2022 18:00) (5 - 40)  SpO2: 99% (01 Mar 2022 18:00) (94% - 100%)       @ 07: @ 07:00  --------------------------------------------------------  IN: 1060.7 mL / OUT: 1600 mL / NET: -539.3 mL     @ 07:01   @ 18:22  --------------------------------------------------------  IN: 677.8 mL / OUT: 455 mL / NET: 222.8 mL    PHYSICAL EXAM :Daily Height in cm: 177.8 (2022 21:20)  Elderly frail male on 3 liter nasal cannula    Daily Weight in k.7 (2022 21:20)  HEENT:     + NCAT  + EOMI  - Conjuctival edema   - Icterus   - Thrush   - ETT  - NGT/OGT  Neck:         + FROM RT IJ  JVD     - Nodes     - Masses    + Mid-line trachea   - Tracheostomy  Chest:           mild kyphosis   Lungs:          + CTA  + Rhonchi  + Rales    - Wheezing  + Decreased BS   - Dullness R L  Cardiac:       + S1 + S2    + RRR   - Irregular   - S3  - S4    - Murmurs   - Rub   - Hamman’s sign   Abdomen:    + BS     + Soft    + Non-tender  - Distended  - Organomegaly  - PEG Cholecystostomy tube in place   Extremities:   - Cyanosis U/L   - Clubbing  U/L  - LE/UE Edema   + Capillary refill    + Pulses   Neuro:        + Awake   +  Alert   - Confused   - Lethargic   - Sedated   - Generalized Weakness  Skin:        - Rashes    - Erythema   + Normal incisions   + IV sites intact + sacral and bilateral wound       HOSPITAL MEDICATIONS: All mediciations reviewed and analyzed  MEDICATIONS  (STANDING):  albumin human  5% IVPB 250 milliLiter(s) IV Intermittent once  cefepime   IVPB 2000 milliGRAM(s) IV Intermittent every 12 hours  chlorhexidine 2% Cloths 1 Application(s) Topical <User Schedule>  cholecalciferol 1000 Unit(s) Oral daily  dextrose 40% Gel 15 Gram(s) Oral once  dextrose 50% Injectable 25 Gram(s) IV Push once  gabapentin Solution 100 milliGRAM(s) Oral three times a day  glucagon  Injectable 1 milliGRAM(s) IntraMuscular once  insulin lispro (ADMELOG) corrective regimen sliding scale   SubCutaneous every 6 hours  magnesium sulfate  IVPB 1 Gram(s) IV Intermittent once  methimazole 10 milliGRAM(s) Oral daily  metoclopramide 10 milliGRAM(s) Oral four times a day  mexiletine 150 milliGRAM(s) Oral every 8 hours  mirtazapine 7.5 milliGRAM(s) Oral at bedtime  multivitamin 1 Tablet(s) Oral daily  pantoprazole  Injectable 40 milliGRAM(s) IV Push daily  potassium phosphate IVPB 15 milliMole(s) IV Intermittent once  senna 2 Tablet(s) Oral at bedtime  sertraline 100 milliGRAM(s) Oral daily  vancomycin  IVPB 750 milliGRAM(s) IV Intermittent every 24 hours  vasopressin Infusion 0.02 Unit(s)/Min (1.2 mL/Hr) IV Continuous <Continuous>    MEDICATIONS  (PRN):    LABS: All Lab data reviewed and analyzed                        8.1    9.30  )-----------( 149      ( 23 Mar 2022 04:22 )  03-    132<L>  |  97  |  31<H>  ----------------------------<  108<H>  4.0   |  24  |  0.64    Ca    9.3      23 Mar 2022 04:22  Phos  2.3     -  Mg     1.5         TPro  9.1<H>  /  Alb  3.0<L>  /  TBili  0.4  /  DBili  x   /  AST  35  /  ALT  42  /  AlkPhos  220<H>                 25.2    Ca    9.4      21 Mar 2022 06:29  Phos  2.2     03-  Mg     1.7         TPro  8.9<H>  /  Alb  3.1<L>  /  TBili  0.7  /  DBili  x   /  AST  49<H>  /  ALT  58<H>  /  AlkPhos  206<H>        TPro  9.4<H>  /  Alb  3.2<L>  /  TBili  0.4  /  DBili  x   /  AST  35  /  ALT  24  /  AlkPhos  207<H>  16             PTT - ( 01 Mar 2022 00:08 )  PTT:25.0 sec LIVER FUNCTIONS - ( 01 Mar 2022 17:38 )  Alb: 2.9 g/dL / Pro: 7.3 g/dL / ALK PHOS: 116 U/L / ALT: 30 U/L / AST: 29 U/L / GGT: x           RADIOLOGY: - Reviewed and analyzed

## 2022-03-24 NOTE — PROGRESS NOTE ADULT - ASSESSMENT
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    S/p prolonged complex hospitalization from 6/14-11/16/2021 initially admitted with abdominal pain complicated by GIB, respiratory failure s/p trach, multiple infections, s/p cholecystotomy tube and SMA stent 10/2021, ultimately discharged to [facility] rehab and then returned to [facility] for trach decannulation 12/2. The patient had a recent admission with COVID 19 reinfection and distributive shock. That admission he had blood culture positive for serratia marcescens (discharged on levaquin).  He was treated with MAB, remdesivir, and steroids. He had recurrent VT and was started mexiletine.     Now coming in from [facility] ED with leukocytosis and AMS was treated for UTI. Initially required bipap but was weaned off here. Labs concerning here for WBC 26, hemoglobin of 7s then 6.7, creatinine of 1.3. His maps were initially in the 50s. Physical exam showing pus from stoma, sat of central access of 81.8, and tachycardia are all concerning for septic shock picture. He is s/p 1L fluids, 1uPRBCs, was placed on vaso. Patient has known episodes of VT and had recurrent NSVT/VT when he first arrived started on lidocaine infusion with improvement. Of note he was previously on mexiletine 150 TID outpatient, metoprolol ER 25 qAM and 50 qPM. Previous history of thyrotoxicosis on amiodarone.    He was found to be bacteremic with recurrent serratia, on IV abx possibly r/t LVAD infection vs splenic abscess noted on CT 1/19. Afebrile since 3/1. Vasopressin weaned off 3/4, started on midodrine which has now been off since 3/8. LVAD without s/s of pump malfunction. Currently he opens his eyes however remains nonverbal and not following commands. His mental status is still not back to baseline. His VT has now subsided, last episode was noted on 3/12.  His hyponatremia and hyperkalemia are overall improving after receiving the following treatment insulin/D50 and lokelma.  Recently his hemoglobin has continued to drop, will be transfused with 1 uPRBC on 3/16. Overall fairly stable from HF perspective. 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 After family discussion on 3/17, family wishes to continues course of treatment and are wanting to proceed with PEG placement however after further discussion with GI and IR patient is not a candidate. A new family meeting is schedule for tomorrow afternoon.

## 2022-03-24 NOTE — PROGRESS NOTE ADULT - PROBLEM SELECTOR PLAN 1
- 2/28 BCx + serratia/GNR  - WBC overall improving now with broaden antibiotics.   - CT C/A/P 3/18 concerning for mucous plugging with possible aspiration PNA  - palliative care following, family meeting held on 3/17. At this time the family wishes to continue with course of treatment and would like PEG to be placed. ID has cleared patient to undergo PEG however as per GI and IR team patient is high risk to undergo procedure.  - Family meeting on Friday 3/24 at 3pm.

## 2022-03-24 NOTE — PROGRESS NOTE ADULT - PROBLEM SELECTOR PLAN 3
Initially treated for serratia bacteremia with cefepime- source likely LVAD  CT showed abdominal aortic thrombus (?infected) and a 2.6cm splenic lesion (?abscess)  ID following- complete 7 days meropenem today, revert back to cefepime tomorrow to cover prior serratia bacteremia /driveline infection for 4-6 weeks

## 2022-03-24 NOTE — PROGRESS NOTE ADULT - ASSESSMENT
Assessment and Recommendation:   · Assessment	  Assessment and recommendation :  Acute hypercapnic respiratory Failure on 3 liter on nasal cannula    S/P Septic shock   Bacteremia with serratia marcescens  mucous plug on antibiotic hold on PEG   Severe anemia with low H/H   S/P transfuse another  unit of Packed RBCs   Severe ischemic cardiomyopathy   ACC/AHA stage D systolic heart failure  severe ischemic cardiomyopathy EF 10%  wound care for sacral and bilateral buttock wound   Peripheral vascular Disease   PAF on no ACO   One RT LL opacity improved will clear for PEG placement   H/O of repeated GI bleeding   S/P mesenteric ischemia   GERD on Reglan   S/P HM2 LVAD , VT on Mexiletine   hyperthyroidism on  methimazole   severe protein caloric malnutrition   severe neuropathy   Major depression   Continue Antibiotic meropenem  NG tube feeding   GI prophylaxis   patient is DNR/DNI

## 2022-03-25 NOTE — GOALS OF CARE CONVERSATION - ADVANCED CARE PLANNING - CONVERSATION DETAILS
d/w the patient's family about the patient's worsening condition (with recurrent bacteremia/sepsis, recurrent severe anemia needing transfusions) and persistent poor neuro status as well as poor functional state. They were also informed about GI inputs regarding the patient not been a candidate for a PEG. The multidisciplinary team indicated that, at this point, the patient is likely entering his dying process and that he has reached a point were treatments are not providing a cure but delaying the patient's natural progression of his terminal disease (mainly bacteremia 2/2 to VAD infection over multiple chronic issues including but not limited to recurrent anemia). The patient's family gave verbalized understanding. A recommendation for stopping blood work and focusing on symptoms Rx was provided. Option for stopping Abx and blood transfusion were also given. An invitation for using substituted judgement and trying to figure out what the patient may have wanted at this point (now that he is not really able to communicate, not able eat, and is dependent on all activities of daily living. Furthermore, understanding that, under the current circumstances [with recurrent bacteremia, frequent need for blood products, and non NGT feeds while not able to get a PEG] he will never be able to leave the hospital) was given. The patient's family indicated that it was God's will and that if God wanted to take the patient's life that it was up to him. Hence, they wanted to continue any available Rxs to prolong his life including blood products and Abx. They also wanted to continue with routine blood work if needed. The medical team recognized their goals; however, it was indicated that even though these Rxs will continue, as long as it was medically and technically possible, that the patient's natural illness process (2/2 to his infection, bleeding, or cardiovascular issues) was going to make him to continue to decline and that at some point, sooner rather than later, it was going to cause his death. His family tried to reflect in the past and option for transferring the patient to other institutions or explanting his LVAD. It was explained that, at this point, explantation and transferring the patient to other hospital were not possible options.     At this point, I will sign off. However, call me back if the patient's symptoms were not be intractable or if goals were to change.   Nonetheless, his family agree with trying to control the patient's pain if it were to be present.

## 2022-03-25 NOTE — PROGRESS NOTE ADULT - PROBLEM SELECTOR PLAN 1
Suspect secondary to infection with diffuse cerebral dysfunction.   Per family meeting 3/17 continue current treatment and obtain PEG if able  Repeat family meeting scheduled for today to readdress treatment plan   They are reportedly aware of poor prognosis and functional quadriplegia  DNR/DNI

## 2022-03-25 NOTE — PROVIDER CONTACT NOTE (OTHER) - ACTION/TREATMENT ORDERED:
Hospitalist called and is aware of situation and MAPs. MAPs are preferred with LVADS. C/w blood and monitor the patient. Notify for acute changes and continue to monitor.

## 2022-03-25 NOTE — CHART NOTE - NSCHARTNOTEFT_GEN_A_CORE
Nutrition Follow Up Note  Patient seen for: Follow-up     Chart reviewed, events noted.  Per Chart: Pt is a 66M, PMH of Stage D 2/2 NICM s/p HM2 LVAD in 9/2017 at  (due to severe PAD) with TV ring, multiple GI bleeds, thus maintained off AC, CKD3, prior COVID-19 infection in 4/2020, recurrent syncope post LVAD s/p ILR, s/p prolonged complex hospitalization from 6/14-11/16/2021 initially admitted with abdominal pain complicated by GIB, respiratory failure s/p trach, multiple infections, s/p cholecystotomy tube and SMA stent 10/2021, ultimately discharged to [facility] rehab and then returned to [facility] for trach decannulation 12/2, readmitted in 2/2022 with recurrent COVID-19 infection, initially in distributive shock. Presented from rehab center due to AMS and tachycardia; was placed on BiPAP and transferred to [facility] CICU for concern for sepsis vs septic shock. Geriatrics and Palliative Medicine (GaP) Team was called for goals of care with next family meeting scheduled for 3/25.     Source: [] Patient       [x] EMR        [] RN        [x] Family at bedside       [] Other:    -If unable to interview patient: [] Trach/Vent/BiPAP  [x] Disoriented/confused/inappropriate to interview    Diet Order: NPO with Tube Feed:   Tube Feeding Modality: Nasogastric  Jevity 1.5 Tank (JEVITY1.5RTH)  Total Volume for 24 Hours (mL): 1080  Continuous  Starting Tube Feed Rate {mL per Hour}: 20  Increase Tube Feed Rate by (mL): 10     Every 4 hours  Until Goal Tube Feed Rate (mL per Hour): 45  Tube Feed Duration (in Hours): 24  Tube Feed Start Time: 17:00  Reji(7 Gm Arginine/7 Gm Glut/1.2 Gm HMB     Qty per Day:  2  No Carb Prosource TF     Qty per Day:  1 (03-20-22)    - Is current order appropriate/adequate? [x] Yes  []  No:   At goal rate, EN Order provides: 1080 mL total volume, 1660 kcal, 80 g protein and 821 mL free water. Meets 34 kcal/kG and 1.6 g protein/kG based on dosing weight of 48.7 kG (2/27)    EN Provision (per nursing flow sheet):   On average, the patient has been receiving 20% of EN order in the past 5 complete days. Of note, total volume for 3 complete days were not documented, unable to determine if % is true indicator of EN provision.   (2/20): no documentation per flow sheet - per EMR, tube feed resumed   (3/21): no documentation per flow sheet   (3/22): 50% of EN total goal - per EMR, NGT inserted   (3/23): 50% of EN total goal  (3/24):  no documentation per flow sheet  (3/25):  no documentation per flow sheet thus far     Nutrition-related concerns:  - During visit, EN is running at goal rate. Per RN, no vomiting, constipation or diarrhea.   - On ascorbic acid, cholecalciferol, thiamine, and multivitamin   - Elevated blood glucose noted in the past 2 days (3/24 and 3/25). On sliding scale of insulin  - Noted with hyponatremia. Currently provided with sodium chloride 0.9% bolus, NGT free water boluses and IVF d/c 3/21  - Patient with cholecystomy drain (mL): 150 (3/24), 120 (3/23), 20 (3/22), 30 (3/21), 200 (3/20) and 125 (3/19)    GI:  Last BM _3/24__.   Bowel Regimen? [x] Yes - Miralax and Senna     Weights:   Daily Weight (kG): 52.4 (3/25), 52.7 (3/24), 52.3 (3/23), 53.1 (3/23), 52.1 (3/22), 48.6 (3/21), 46.4 (3/20), 46.4 (3/19)  Dosing Weight: 48.7 kG (2/27)  BMI (kG/m2): 15.4 (2/27)  Ideal Body Weight: 75.3 kG  Weight has been fairly stable in the past 4 days. Previous weight fluctuations likely due to fluid shifts (varying edema during admission) vs true loss vs bed scale discrepancy. Will continue to monitor as able.     Nutritionally Pertinent MEDICATIONS  (STANDING):  ascorbic acid  cholecalciferol  insulin lispro (ADMELOG) corrective regimen sliding scale  meropenem  IVPB  metoprolol tartrate  mexiletine  multivitamin  pantoprazole  Injectable  senna  sodium chloride 0.9% Bolus  thiamine    Pertinent Labs:   A1C with Estimated Average Glucose Result: 5.3 % (03-01-22 @ 22:11)    Finger Sticks:  POCT Blood Glucose.: 134 mg/dL (03-25 @ 05:55)  POCT Blood Glucose.: 135 mg/dL (03-24 @ 23:50)  POCT Blood Glucose.: 132 mg/dL (03-24 @ 17:56)  POCT Blood Glucose.: 156 mg/dL (03-24 @ 11:59)    Skin/pressure injuries as per nursing documentation:   - Surgical incision on right left quadrant   - Trach stoma   - Bilateral heel and sacral DTI   - Right buttocks suspected DTI     Edema: Per flow sheet (3/24), no edema     Estimated Needs:   [x] no change since previous assessment  Based on dosing weight 48.7 kG (2/27)   - Energy: (30-35 kcal/kG) 9757-0017 kcal/day   - Protein: (1.4-1.9 g/kG) 68.18-92.53 g/day   - Fluid needs deferred to provider    Previous Nutrition Diagnosis:   1. Severe chronic malnutrition  2. Underweight   Nutrition Diagnosis is: [x] ongoing  [] resolved [] not applicable     New Nutrition Diagnosis: [x] Not applicable    Nutrition Care Plan:  [] In Progress  [] Achieved  [x] Not applicable - current medical condition precludes RD intervention at this time.     Nutrition Interventions:     Education Provided:       [] Yes:  [x] No: Not appropriate     Recommendations:      1. Continue current ENn order: Jevity 1.5 45 mL/hour x 24 hours.      --> Monitor GOC     --> Keep HOB elevated >45 degrees during feeds.     --> Fluid needs deferred to provider.   2. Continue to provide micronutrient supplements if not medically contraindicated   3. Recommend Reji x2 daily if not medically contraindicated   4. Continue to monitor labs, skin integrity, weight, GI distress and tolerance     RD remains available upon request and will follow up per protocol  Kathryn Owens, Dietetic Intern (Pager #[phone]) Nutrition Follow Up Note  Patient seen for: Follow-up     Chart reviewed, events noted.  Per Chart: Pt is a 66M, PMH of Stage D 2/2 NICM s/p HM2 LVAD in 9/2017 at  (due to severe PAD) with TV ring, multiple GI bleeds, thus maintained off AC, CKD3, prior COVID-19 infection in 4/2020, recurrent syncope post LVAD s/p ILR, s/p prolonged complex hospitalization from 6/14-11/16/2021 initially admitted with abdominal pain complicated by GIB, respiratory failure s/p trach, multiple infections, s/p cholecystotomy tube and SMA stent 10/2021, ultimately discharged to [facility] [place] rehab and then returned to [facility] for trach decannulation 12/2, readmitted in 2/2022 with recurrent COVID-19 infection, initially in distributive shock. Presented from rehab center due to AMS and tachycardia; was placed on BiPAP and transferred to [facility] CICU for concern for sepsis vs septic shock. Geriatrics and Palliative Medicine (GaP) Team was called for goals of care with next family meeting scheduled for 3/25.     Source: [] Patient       [x] EMR        [] RN        [x] Family at bedside       [] Other:    -If unable to interview patient: [] Trach/Vent/BiPAP  [x] Disoriented/confused/inappropriate to interview    Diet Order: NPO with Tube Feed:   Tube Feeding Modality: Nasogastric  Jevity 1.5 Tank (JEVITY1.5RTH)  Total Volume for 24 Hours (mL): 1080  Continuous  Starting Tube Feed Rate {mL per Hour}: 20  Increase Tube Feed Rate by (mL): 10     Every 4 hours  Until Goal Tube Feed Rate (mL per Hour): 45  Tube Feed Duration (in Hours): 24  Tube Feed Start Time: 17:00  Reji(7 Gm Arginine/7 Gm Glut/1.2 Gm HMB     Qty per Day:  2  No Carb Prosource TF     Qty per Day:  1 (03-20-22)    - Is current order appropriate/adequate? [x] Yes  []  No:   At goal rate, EN Order provides: 1080 mL total volume, 1660 kcal, 80 g protein and 821 mL free water. Meets 34 kcal/kG and 1.6 g protein/kG based on dosing weight of 48.7 kG (2/27)    EN Provision (per nursing flow sheet):   On average, the patient has been receiving 20% of EN order in the past 5 complete days. Of note, total volume for 3 complete days were not documented, unable to determine if % is true indicator of EN provision.   (2/20): no documentation per flow sheet - per EMR, tube feed resumed   (3/21): no documentation per flow sheet   (3/22): 50% of EN total goal - per EMR, NGT inserted   (3/23): 50% of EN total goal  (3/24):  no documentation per flow sheet  (3/25):  no documentation per flow sheet thus far     Nutrition-related concerns:  - During visit, EN is running at goal rate. Per RN, no vomiting, constipation or diarrhea.   - On ascorbic acid, cholecalciferol, thiamine, and multivitamin   - Elevated blood glucose noted in the past 2 days (3/24 and 3/25). On sliding scale of insulin  - Noted with hyponatremia. Currently provided with sodium chloride 0.9% bolus, NGT free water boluses and IVF d/c 3/21. Patient with hypophosphatemia, s/p repletion 2/22  - Patient with cholecystomy drain (mL): 150 (3/24), 120 (3/23), 20 (3/22), 30 (3/21), 200 (3/20) and 125 (3/19)    GI:  Last BM _3/24__.   Bowel Regimen? [x] Yes - Miralax and Senna     Weights:   Daily Weight (kG): 52.4 (3/25), 52.7 (3/24), 52.3 (3/23), 53.1 (3/23), 52.1 (3/22), 48.6 (3/21), 46.4 (3/20), 46.4 (3/19)  Dosing Weight: 48.7 kG (2/27)  BMI (kG/m2): 15.4 (2/27)  Ideal Body Weight: 75.3 kG  Weight has been fairly stable in the past 4 days. Previous weight fluctuations likely due to fluid shifts (varying edema during admission) vs true loss vs bed scale discrepancy. Will continue to monitor as able.     Nutritionally Pertinent MEDICATIONS  (STANDING):  ascorbic acid  cholecalciferol  insulin lispro (ADMELOG) corrective regimen sliding scale  meropenem  IVPB  metoprolol tartrate  mexiletine  multivitamin  pantoprazole  Injectable  senna  sodium chloride 0.9% Bolus  thiamine    Pertinent Labs:   Sodium: 133 <L> (03-24)  Phosphorus: 2.3 <L> (03-23)   BUN 45 <H> (03-24)  Glucose: 149 <H> (03-24)  A1C with Estimated Average Glucose Result: 5.3 % (03-01-22)    Finger Sticks:  POCT Blood Glucose.: 134 mg/dL (03-25)  POCT Blood Glucose.: 135 mg/dL (03-24)  POCT Blood Glucose.: 132 mg/dL (03-24)  POCT Blood Glucose.: 156 mg/dL (03-24)    Skin/pressure injuries as per nursing documentation:   - Surgical incision on right left quadrant   - Trach stoma   - Bilateral heel and sacral DTI   - Right buttocks suspected DTI     Edema: Per flow sheet (3/24), no edema     Estimated Needs:   [x] no change since previous assessment  Based on dosing weight 48.7 kG (2/27)   - Energy: (30-35 kcal/kG) 5284-5728 kcal/day   - Protein: (1.4-1.9 g/kG) 68.18-92.53 g/day   - Fluid needs deferred to provider    Previous Nutrition Diagnosis:   1. Severe chronic malnutrition  2. Underweight   Nutrition Diagnosis is: [x] ongoing  [] resolved [] not applicable     New Nutrition Diagnosis: [x] Not applicable    Nutrition Care Plan:  [] In Progress  [] Achieved  [x] Not applicable - current medical condition precludes RD intervention at this time.     Nutrition Interventions:     Education Provided:       [] Yes:  [x] No: Not appropriate     Recommendations:      1. Continue current ENn order: Jevity 1.5 45 mL/hour x 24 hours.      --> Monitor GOC     --> Keep HOB elevated >45 degrees during feeds.     --> Fluid needs deferred to provider.   2. Continue to provide micronutrient supplements if not medically contraindicated   3. Recommend Reji x2 daily if not medically contraindicated   4. Continue to monitor labs, skin integrity, weight, GI distress and tolerance     RD remains available upon request and will follow up per protocol  Kathryn Owens, Dietetic Intern (Pager #[phone]) Nutrition Follow Up Note  Patient seen for: Follow-up     Chart reviewed, events noted.  Per Chart: Pt is a 66M, PMH of Stage D 2/2 NICM s/p HM2 LVAD in 9/2017 at  (due to severe PAD) with TV ring, multiple GI bleeds, thus maintained off AC, CKD3, prior COVID-19 infection in 4/2020, recurrent syncope post LVAD s/p ILR, s/p prolonged complex hospitalization from 6/14-11/16/2021 initially admitted with abdominal pain complicated by GIB, respiratory failure s/p trach, multiple infections, s/p cholecystotomy tube and SMA stent 10/2021, ultimately discharged to [facility] [place] rehab and then returned to [facility] for trach decannulation 12/2, readmitted in 2/2022 with recurrent COVID-19 infection, initially in distributive shock. Presented from rehab center due to AMS and tachycardia; was placed on BiPAP and transferred to [facility] CICU for concern for sepsis vs septic shock. Geriatrics and Palliative Medicine (GaP) Team was called for goals of care with next family meeting scheduled for 3/25.     Source: [] Patient       [x] EMR        [] RN        [x] Family at bedside       [] Other:    -If unable to interview patient: [] Trach/Vent/BiPAP  [x] Disoriented/confused/inappropriate to interview    Diet Order: NPO with Tube Feed:   Tube Feeding Modality: Nasogastric  Jevity 1.5 Tank (JEVITY1.5RTH)  Total Volume for 24 Hours (mL): 1080  Continuous  Starting Tube Feed Rate {mL per Hour}: 20  Increase Tube Feed Rate by (mL): 10     Every 4 hours  Until Goal Tube Feed Rate (mL per Hour): 45  Tube Feed Duration (in Hours): 24  Tube Feed Start Time: 17:00  Reji(7 Gm Arginine/7 Gm Glut/1.2 Gm HMB     Qty per Day:  2  No Carb Prosource TF     Qty per Day:  1 (03-20-22)    - Is current order appropriate/adequate? [x] Yes  []  No:   At goal rate, EN Order provides: 1080 mL total volume, 1660 kcal, 80 g protein and 821 mL free water. Meets 34 kcal/kG and 1.6 g protein/kG based on dosing weight of 48.7 kG (2/27)    EN Provision (per nursing flow sheet):   On average, the patient has been receiving 20% of EN order in the past 5 complete days. Of note, total volume for 3 complete days were not documented, unable to determine if % is true indicator of EN provision.   (2/20): no documentation per flow sheet - per EMR, tube feed resumed   (3/21): no documentation per flow sheet   (3/22): 50% of EN total goal - per EMR, NGT inserted   (3/23): 50% of EN total goal  (3/24):  no documentation per flow sheet  (3/25):  no documentation per flow sheet thus far     Nutrition-related concerns:  - During visit, EN is running at goal rate. Per RN, no vomiting, constipation or diarrhea.   - On ascorbic acid, cholecalciferol, thiamine, and multivitamin   - Elevated blood glucose noted in the past 2 days (3/24 and 3/25). On sliding scale of insulin  - Noted with hyponatremia. Currently provided with sodium chloride 0.9% bolus, NGT free water boluses and IVF d/c 3/21. Patient with hypophosphatemia, s/p repletion 2/22  - Patient with cholecystomy drain (mL): 150 (3/24), 120 (3/23), 20 (3/22), 30 (3/21), 200 (3/20) and 125 (3/19)    GI:  Last BM _3/24__.   Bowel Regimen? [x] Yes - Miralax and Senna     Weights:   Daily Weight (kG): 52.4 (3/25), 52.7 (3/24), 52.3 (3/23), 53.1 (3/23), 52.1 (3/22), 48.6 (3/21), 46.4 (3/20), 46.4 (3/19)  Dosing Weight: 48.7 kG (2/27)  BMI (kG/m2): 15.4 (2/27)  Ideal Body Weight: 75.3 kG  Weight has been fairly stable in the past 4 days. Previous weight fluctuations likely due to fluid shifts (varying edema during admission) vs true loss vs bed scale discrepancy. Will continue to monitor as able.     Nutritionally Pertinent MEDICATIONS  (STANDING):  ascorbic acid  cholecalciferol  insulin lispro (ADMELOG) corrective regimen sliding scale  meropenem  IVPB  metoprolol tartrate  mexiletine  multivitamin  pantoprazole  Injectable  senna  sodium chloride 0.9% Bolus  thiamine    Pertinent Labs:   Sodium: 133 <L> (03-24)  Phosphorus: 2.3 <L> (03-23)   BUN 45 <H> (03-24)  Glucose: 149 <H> (03-24)  A1C with Estimated Average Glucose Result: 5.3 % (03-01-22)    Finger Sticks:  POCT Blood Glucose.: 134 mg/dL (03-25)  POCT Blood Glucose.: 135 mg/dL (03-24)  POCT Blood Glucose.: 132 mg/dL (03-24)  POCT Blood Glucose.: 156 mg/dL (03-24)    Skin/pressure injuries as per nursing documentation:   - Surgical incision on right left quadrant   - Trach stoma   - Bilateral heel and sacral DTI   - Right buttocks suspected DTI     Edema: Per flow sheet (3/24), no edema     Estimated Needs:   [x] no change since previous assessment  Based on dosing weight 48.7 kG (2/27)   - Energy: (30-35 kcal/kG) 5185-9617 kcal/day   - Protein: (1.4-1.9 g/kG) 68.18-92.53 g/day   - Fluid needs deferred to provider    Previous Nutrition Diagnosis:   1. Severe chronic malnutrition  2. Underweight   Nutrition Diagnosis is: [x] ongoing  [] resolved [] not applicable     New Nutrition Diagnosis: [x] Not applicable    Nutrition Care Plan:  [] In Progress  [] Achieved  [x] Not applicable - current medical condition precludes RD intervention at this time.     Nutrition Interventions:     Education Provided:       [] Yes:  [x] No: Not appropriate     Recommendations:      1. Continue current ENn order: Jevity 1.5 45 mL/hour x 24 hours.      --> Monitor GOC     --> Keep HOB elevated >45 degrees during feeds.     --> Fluid needs deferred to provider.   2. Continue to provide micronutrient supplements if not medically contraindicated   3. Recommend Reji x2 daily if not medically contraindicated   4. Continue to monitor labs (hypophosphoremia), skin integrity, weight, GI distress and tolerance     RD remains available upon request and will follow up per protocol  Kathryn Owens, Dietetic Intern (Pager #[phone]) Nutrition Follow Up Note  Patient seen for: Follow-up     Chart reviewed, events noted.  Per Chart: Pt is a 66M, PMH of Stage D 2/2 NICM s/p HM2 LVAD in 9/2017 at  (due to severe PAD) with TV ring, multiple GI bleeds, thus maintained off AC, CKD3, prior COVID-19 infection in 4/2020, recurrent syncope post LVAD s/p ILR, s/p prolonged complex hospitalization from 6/14-11/16/2021 initially admitted with abdominal pain complicated by GIB, respiratory failure s/p trach, multiple infections, s/p cholecystotomy tube and SMA stent 10/2021, ultimately discharged to [facility] rehab and then returned to [facility] for trach decannulation 12/2, readmitted in 2/2022 with recurrent COVID-19 infection, initially in distributive shock. Presented from rehab center due to AMS and tachycardia; was placed on BiPAP and transferred to [facility] CICU for concern for sepsis vs septic shock. Geriatrics and Palliative Medicine (GaP) Team was called for goals of care with next family meeting scheduled for 3/25.     Source: [] Patient       [x] EMR        [] RN        [x] Family at bedside       [] Other:    -If unable to interview patient: [] Trach/Vent/BiPAP  [x] Disoriented/confused/inappropriate to interview    Diet Order: NPO with Tube Feed:   Tube Feeding Modality: Nasogastric  Jevity 1.5 Tank (JEVITY1.5RTH)  Total Volume for 24 Hours (mL): 1080  Continuous  Starting Tube Feed Rate {mL per Hour}: 20  Increase Tube Feed Rate by (mL): 10     Every 4 hours  Until Goal Tube Feed Rate (mL per Hour): 45  Tube Feed Duration (in Hours): 24  Tube Feed Start Time: 17:00  Reji(7 Gm Arginine/7 Gm Glut/1.2 Gm HMB     Qty per Day:  2  No Carb Prosource TF     Qty per Day:  1 (03-20-22)    - Is current order appropriate/adequate? [x] Yes  []  No:   At goal rate, EN Order provides: 1080 mL total volume, 1660 kcal, 80 g protein and 821 mL free water. Meets 34 kcal/kG and 1.6 g protein/kG based on dosing weight of 48.7 kG (2/27)    Visited pt resting in bed, observed Jevity 1.5 hung and infusing at goal rate of 45ml/hr x 24hrs. RN notes pt receiving EN as ordered with Reji BID and additional 1 Prosource daily.     Nutrition-related concerns:  - During visit, EN is running at goal rate. Per RN, no vomiting, constipation or diarrhea.   - On ascorbic acid, cholecalciferol, thiamine, and multivitamin   - Elevated blood glucose noted in the past 2 days (3/24 and 3/25). On sliding scale of insulin  - Noted with hyponatremia. Currently provided with sodium chloride 0.9% bolus, NGT free water boluses and IVF d/c 3/21. Patient with hypophosphatemia, s/p repletion 2/22  - Patient with cholecystomy drain (mL): 150 (3/24), 120 (3/23), 20 (3/22), 30 (3/21), 200 (3/20) and 125 (3/19)    GI:  Last BM _3/24__.   Bowel Regimen? [x] Yes - Miralax and Senna     Weights:   Daily Weight (kG): 52.4 (3/25), 52.7 (3/24), 52.3 (3/23), 53.1 (3/23), 52.1 (3/22), 48.6 (3/21), 46.4 (3/20), 46.4 (3/19)  Dosing Weight: 48.7 kG (2/27)  BMI (kG/m2): 15.4 (2/27)  Ideal Body Weight: 75.3 kG  Weight has been fairly stable in the past 4 days. Previous weight fluctuations likely due to fluid shifts (varying edema during admission) vs true loss vs bed scale discrepancy. Will continue to monitor as able.     Nutritionally Pertinent MEDICATIONS  (STANDING):  ascorbic acid  cholecalciferol  insulin lispro (ADMELOG) corrective regimen sliding scale  meropenem  IVPB  metoprolol tartrate  mexiletine  multivitamin  pantoprazole  Injectable  senna  sodium chloride 0.9% Bolus  thiamine    Pertinent Labs:   Sodium: 133 <L> (03-24)  Phosphorus: 2.3 <L> (03-23)   BUN 45 <H> (03-24)  Glucose: 149 <H> (03-24)  A1C with Estimated Average Glucose Result: 5.3 % (03-01-22)    Finger Sticks:  POCT Blood Glucose.: 134 mg/dL (03-25)  POCT Blood Glucose.: 135 mg/dL (03-24)  POCT Blood Glucose.: 132 mg/dL (03-24)  POCT Blood Glucose.: 156 mg/dL (03-24)    Skin/pressure injuries as per nursing documentation:   - Surgical incision on right left quadrant   - Trach stoma   - Bilateral heel and sacral DTI   - Right buttocks suspected DTI     Edema: Per flow sheet (3/24), no edema     Estimated Needs:   [x] no change since previous assessment  Based on dosing weight 48.7 kG (2/27)   - Energy: (30-35 kcal/kG) 4438-3594 kcal/day   - Protein: (1.4-1.9 g/kG) 68.18-92.53 g/day   - Fluid needs deferred to provider    Previous Nutrition Diagnosis:   1. Severe chronic malnutrition  2. Underweight   Nutrition Diagnosis is: [x] ongoing  [] resolved [] not applicable     New Nutrition Diagnosis: [x] Not applicable    Nutrition Care Plan:  [] In Progress  [] Achieved  [x] Not applicable - current medical condition precludes RD intervention at this time.     Nutrition Interventions:     Education Provided:       [] Yes:  [x] No: Not appropriate     Recommendations:      1. Continue current EN order: Jevity 1.5 @ 45 mL/hour x 24 hours.      --> Monitor GOC     --> Keep HOB elevated >45 degrees during feeds.     --> Fluid needs deferred to provider.   2. Continue to provide micronutrient supplements if not medically contraindicated   3. Recommend Reji x2 daily if not medically contraindicated   4. Continue to monitor labs (hypophosphoremia), skin integrity, weight, GI distress and tolerance     RD remains available upon request and will follow up per protocol  Kathryn Owens, Dietetic Intern (Pager #[phone])

## 2022-03-25 NOTE — PROGRESS NOTE ADULT - SUBJECTIVE AND OBJECTIVE BOX
Mich Krueger MD    Patient is a 66y old  Male who presents with a chief complaint of Septic shock (25 Mar 2022 07:20)        SUBJECTIVE / OVERNIGHT EVENTS: No new events. Received 2 units PRBCs in 4 half unit doses  TELEMETRY: ST/ 110-130's        MEDICATIONS  (STANDING):  ascorbic acid 500 milliGRAM(s) Oral daily  cefepime   IVPB 1000 milliGRAM(s) IV Intermittent every 12 hours  chlorhexidine 2% Cloths 1 Application(s) Topical <User Schedule>  cholecalciferol 2000 Unit(s) Oral daily  insulin lispro (ADMELOG) corrective regimen sliding scale   SubCutaneous every 6 hours  metoprolol tartrate 50 milliGRAM(s) Oral two times a day  mexiletine 150 milliGRAM(s) Oral every 8 hours  multivitamin 1 Tablet(s) Oral daily  pantoprazole  Injectable 40 milliGRAM(s) IV Push two times a day  senna 2 Tablet(s) Oral at bedtime  sertraline 100 milliGRAM(s) Oral daily  sodium chloride 0.9% Bolus 250 milliLiter(s) IV Bolus once  thiamine 100 milliGRAM(s) Oral daily  traMADol 25 milliGRAM(s) Oral once    MEDICATIONS  (PRN):  polyethylene glycol 3350 17 Gram(s) Oral daily PRN Constipation      Vital Signs Last 24 Hrs  T(C): 36.8 (25 Mar 2022 09:17), Max: 37.1 (24 Mar 2022 16:10)  T(F): 98.2 (25 Mar 2022 09:17), Max: 98.7 (24 Mar 2022 16:10)  HR: 124 (25 Mar 2022 09:17) (101 - 128)  BP: 94/74 (25 Mar 2022 09:18) (80/58 - 99/54)  BP(mean): 81 (25 Mar 2022 09:18) (64 - 81)  RR: 18 (25 Mar 2022 09:17) (18 - 20)  SpO2: 100% (25 Mar 2022 09:17) (97% - 100%)  CAPILLARY BLOOD GLUCOSE      POCT Blood Glucose.: 134 mg/dL (25 Mar 2022 05:55)  POCT Blood Glucose.: 135 mg/dL (24 Mar 2022 23:50)  POCT Blood Glucose.: 132 mg/dL (24 Mar 2022 17:56)  POCT Blood Glucose.: 156 mg/dL (24 Mar 2022 11:59)    I&O's Summary    24 Mar 2022 07:01  -  25 Mar 2022 07:00  --------------------------------------------------------  IN: 0 mL / OUT: 1400 mL / NET: -1400 mL          PHYSICAL EXAM  GENERAL: NAD, cachetic  HEAD:  Atraumatic, normocephalic; +NGT  EYES: EOMI, PERRLA, conjunctiva and sclera clear  CHEST/LUNG: Clear to auscultation bilaterally; no wheezes  HEART: +LVAD hum  ABDOMEN: Soft, nontender, nondistended; bowel sounds present; R sided driveline with clean dressing in place; R percut dawn drain with bilious drainage  EXTREMITIES:  2+ peripheral pulses; no clubbing, cyanosis, or edema  PSYCH: AAOx0-1, opens eyes to name    LABS:                        9.7    10.60 )-----------( 140      ( 25 Mar 2022 09:23 )             29.4     03-25    136  |  103  |  36<H>  ----------------------------<  139<H>  4.5   |  26  |  0.69    Ca    9.1      25 Mar 2022 09:23  Mg     2.0     03-24                  RADIOLOGY & ADDITIONAL TESTS:    Imaging Personally Reviewed:  Consultant(s) Notes Reviewed:    Care Discussed with Consultants/Other Providers:

## 2022-03-25 NOTE — GOALS OF CARE CONVERSATION - ADVANCED CARE PLANNING - FAMILY/RELATIVE
Cristina Rudolph ()
Sister, Cristina Rudolph. (HCP)
Cristina Rudolph (HCP)
Cristina Rudolph (HCP and sister)
Cristina Rudolph (HCP and sister), Gene (Cristina's )
sister/HCP Alyssa, son Kang, brother-in-law Miller

## 2022-03-25 NOTE — PROGRESS NOTE ADULT - SUBJECTIVE AND OBJECTIVE BOX
INFECTIOUS DISEASES FOLLOW UP-- Anitra Prater  [phone]    This is a follow up note for this  66yMale with  Sepsis        ROS:  CONSTITUTIONAL:  No fever, good appetite  CARDIOVASCULAR:  No chest pain or palpitations  RESPIRATORY:  No dyspnea  GASTROINTESTINAL:  No nausea, vomiting, diarrhea, or abdominal pain  GENITOURINARY:  No dysuria  NEUROLOGIC:  No headache,     Allergies    Amiodarone Hydrochloride (Other (Severe))    Intolerances        ANTIBIOTICS/RELEVANT:  antimicrobials  cefepime   IVPB 1000 milliGRAM(s) IV Intermittent every 12 hours    immunologic:    OTHER:  ascorbic acid 500 milliGRAM(s) Oral daily  chlorhexidine 2% Cloths 1 Application(s) Topical <User Schedule>  cholecalciferol 2000 Unit(s) Oral daily  HYDROmorphone  Injectable 0.2 milliGRAM(s) IV Push every 2 hours PRN  insulin lispro (ADMELOG) corrective regimen sliding scale   SubCutaneous every 6 hours  metoprolol tartrate 50 milliGRAM(s) Oral two times a day  mexiletine 150 milliGRAM(s) Oral every 8 hours  multivitamin 1 Tablet(s) Oral daily  pantoprazole  Injectable 40 milliGRAM(s) IV Push two times a day  polyethylene glycol 3350 17 Gram(s) Oral daily PRN  senna 2 Tablet(s) Oral at bedtime  sertraline 100 milliGRAM(s) Oral daily  sodium chloride 0.9% Bolus 250 milliLiter(s) IV Bolus once  thiamine 100 milliGRAM(s) Oral daily  traMADol 25 milliGRAM(s) Oral once      Objective:  Vital Signs Last 24 Hrs  T(C): 36.7 (25 Mar 2022 17:28), Max: 36.8 (25 Mar 2022 09:17)  T(F): 98.1 (25 Mar 2022 17:28), Max: 98.2 (25 Mar 2022 09:17)  HR: 100 (25 Mar 2022 17:30) (100 - 128)  BP: 93/69 (25 Mar 2022 17:30) (80/58 - 99/54)  BP(mean): 77 (25 Mar 2022 17:30) (65 - 81)  RR: 18 (25 Mar 2022 17:28) (16 - 20)  SpO2: 100% (25 Mar 2022 17:28) (97% - 100%)    PHYSICAL EXAM:  Constitutional:no acute distress  Eyes:ALONSO, EOMI  Ear/Nose/Throat: no oral lesions, 	  Respiratory: clear BL  Cardiovascular: S1S2  Gastrointestinal:soft, (+) BS, no tenderness  Extremities:no e/e/c  No Lymphadenopathy  IV sites not inflammed.    LABS:                        9.7    10.60 )-----------( 140      ( 25 Mar 2022 09:23 )             29.4     03-25    136  |  103  |  36<H>  ----------------------------<  139<H>  4.5   |  26  |  0.69    Ca    9.1      25 Mar 2022 09:23  Mg     2.0     03-24            MICROBIOLOGY:            RECENT CULTURES:      RADIOLOGY & ADDITIONAL STUDIES: INFECTIOUS DISEASES FOLLOW UP-- Anitra Prater  [phone]    This is a follow up note for this  66yMale with  Sepsis        ROS:  CONSTITUTIONAL:  poor mental status  non interactive    Allergies    Amiodarone Hydrochloride (Other (Severe))    Intolerances        ANTIBIOTICS/RELEVANT:  antimicrobials  cefepime   IVPB 1000 milliGRAM(s) IV Intermittent every 12 hours    immunologic:    OTHER:  ascorbic acid 500 milliGRAM(s) Oral daily  chlorhexidine 2% Cloths 1 Application(s) Topical <User Schedule>  cholecalciferol 2000 Unit(s) Oral daily  HYDROmorphone  Injectable 0.2 milliGRAM(s) IV Push every 2 hours PRN  insulin lispro (ADMELOG) corrective regimen sliding scale   SubCutaneous every 6 hours  metoprolol tartrate 50 milliGRAM(s) Oral two times a day  mexiletine 150 milliGRAM(s) Oral every 8 hours  multivitamin 1 Tablet(s) Oral daily  pantoprazole  Injectable 40 milliGRAM(s) IV Push two times a day  polyethylene glycol 3350 17 Gram(s) Oral daily PRN  senna 2 Tablet(s) Oral at bedtime  sertraline 100 milliGRAM(s) Oral daily  sodium chloride 0.9% Bolus 250 milliLiter(s) IV Bolus once  thiamine 100 milliGRAM(s) Oral daily  traMADol 25 milliGRAM(s) Oral once      Objective:  Vital Signs Last 24 Hrs  T(C): 36.7 (25 Mar 2022 17:28), Max: 36.8 (25 Mar 2022 09:17)  T(F): 98.1 (25 Mar 2022 17:28), Max: 98.2 (25 Mar 2022 09:17)  HR: 100 (25 Mar 2022 17:30) (100 - 128)  BP: 93/69 (25 Mar 2022 17:30) (80/58 - 99/54)  BP(mean): 77 (25 Mar 2022 17:30) (65 - 81)  RR: 18 (25 Mar 2022 17:28) (16 - 20)  SpO2: 100% (25 Mar 2022 17:28) (97% - 100%)    PHYSICAL EXAM:  Constitutional:non responsive lying in bed  feeding tube  Eyes:ALONSO, EOMI  Ear/Nose/Throat: no oral lesions, 	  Respiratory: clear BL  Cardiovascular: S1S2  Gastrointestinal:soft, (+) BS, no tenderness  CDI exit lvad site  Extremities:no e/e/c  No Lymphadenopathy  IV sites not inflammed.    LABS:                        9.7    10.60 )-----------( 140      ( 25 Mar 2022 09:23 )             29.4     03-25    136  |  103  |  36<H>  ----------------------------<  139<H>  4.5   |  26  |  0.69    Ca    9.1      25 Mar 2022 09:23  Mg     2.0     03-24            MICROBIOLOGY:            RECENT CULTURES:      RADIOLOGY & ADDITIONAL STUDIES:    < from: Xray Chest 1 View- PORTABLE-Urgent (Xray Chest 1 View- PORTABLE-Urgent .) (03.23.22 @ 02:21) >  IMPRESSION: Nasogastric tube in good position on the final image.    < end of copied text >

## 2022-03-25 NOTE — PROGRESS NOTE ADULT - ASSESSMENT
a/p 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 s/p HM2 LVAD in 9/2017 at  (due to severe PAD) with TV ring, multiple GI bleeds, thus maintained off AC, prior COVID-19 infection in 4/2020, recurrent syncope post LVAD s/p ILR, prolonged complex hospitalization from 6/14-11/16/2021 with GIB, respiratory failure s/p trach decanulated 12/2021, multiple infections, s/p cholecystotomy tube and SMA stent 10/2021, recurrent COVID 19 reinfection s/p MAB/remdesivir/steroids and distributive shock with serratia bacteremia, recurrent VT on mexiletine transferred from [facility] ED with metabolic encephalopathy, hypercarbic respiratory failure weaned off bipap, septic shock requiring pressors (now on midodrine) with serratia bacteremia on cefepime, intermittent h/h drop s/p total 3units prbc (last 3/6), episodes of recurrent VT s/p lidocaine gtt now on mexiletine. Receiving NGT feedings, per GI/IR too high risk for PEG placement      Wound Consulted to assist w/ management of   Rt Buttocks - evolving deep tissue injuries/ unstageable pressure injuries  Lt buttock/sacrum - stage 2 pressure injury  Rt Hip wound, possible device related  Incontinence of bowel  Incontinence Dermatitis    1.) sacral/bilateral buttock/scrotal injuries - Triad ointment BID and PRN for incontinent episodes  Bilateral heel injuries - as per DPM  2.) Incontinence Management - incontinence cleanser, pads, pericare BID  3.) Maintain on an alternating air with low air loss surface  4.) Turn and reposition Q 2 hours w/ offloading devices  5.) Nutrition optimization- continue high quality protein, mvi, vit c to assist w/ wound healing  6.) Offload heels/feet with complete cair air fluidized boots; ensure that the soles of the feet are not resting on the foot board of the bed.  7.) Wound care team Podiatrists, Luis Benitez/Kaleigh/Kelly will follow foot wounds.  Care as per medicine. Will  follow with you  Upon discharge f/u as outpatient at [facility] [address] [place] [address] [phone]  Eula Ott [place], [place] [id number]  we spent 25minutes face to face w/ this pt of which more than 50% of the time was spent counseling & coordinating care of this pt.  a/p 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 s/p HM2 LVAD in 9/2017 at  (due to severe PAD) with TV ring, multiple GI bleeds, thus maintained off AC, prior COVID-19 infection in 4/2020, recurrent syncope post LVAD s/p ILR, prolonged complex hospitalization from 6/14-11/16/2021 with GIB, respiratory failure s/p trach decanulated 12/2021, multiple infections, s/p cholecystotomy tube and SMA stent 10/2021, recurrent COVID 19 reinfection s/p MAB/remdesivir/steroids and distributive shock with serratia bacteremia, recurrent VT on mexiletine transferred from [facility] ED with metabolic encephalopathy, hypercarbic respiratory failure weaned off bipap, septic shock requiring pressors (now on midodrine) with serratia bacteremia on cefepime, intermittent h/h drop s/p total 3units prbc (last 3/6), episodes of recurrent VT s/p lidocaine gtt now on mexiletine. Receiving NGT feedings, per GI/IR too high risk for PEG placement      Wound Consulted to assist w/ management of   Rt Buttocks - evolving deep tissue injuries/ unstageable pressure injuries  Lt buttock/sacrum - stage 2 pressure injury  Rt Hip wound, possible device related  Incontinence of bowel  Incontinence Dermatitis    1.) sacral/bilateral buttock/scrotal injuries - Triad ointment BID and PRN for incontinent episodes  Bilateral heel injuries - as per DPM  Rt lateral Hip- Cavilon QD  2.) Incontinence Management - incontinence cleanser, pads, pericare BID  3.) Maintain on an alternating air with low air loss surface  4.) Turn and reposition Q 2 hours w/ offloading devices  5.) Nutrition optimization- continue high quality protein, mvi, vit c to assist w/ wound healing  6.) Offload heels/feet with complete cair air fluidized boots; ensure that the soles of the feet are not resting on the foot board of the bed.  7.) Wound care team Podiatrists, Luis Benitez/Kaleigh/Kelly will follow foot wounds.  Care as per medicine. Will  follow with you  Upon discharge f/u as outpatient at [facility] [address] [facility] [phone]  Eula Ott [place], [place] [id number]  we spent 25minutes face to face w/ this pt of which more than 50% of the time was spent counseling & coordinating care of this pt.

## 2022-03-25 NOTE — PROGRESS NOTE ADULT - SUBJECTIVE AND OBJECTIVE BOX
RICKY JOINT  MRN#: [id number]  Subjective: pulmonary progress note  : acute hypercapnic respiratory failure . Septic shock , service rendered on 2002   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 s/p HM2 LVAD in 2017 at  (due to severe PAD) with TV ring, multiple GI bleeds, thus maintained off AC, CKD 3prior COVID-19 infection in 2020, recurrent syncope post LVAD s/p ILR, s/p prolonged complex hospitalization from -2021 initially admitted with abdominal pain complicated by GIB, respiratory failure s/p trach, multiple infections, s/p cholecystotomy tube and SMA stent 10/2021, ultimately discharged to [facility] rehab and then returned to [facility] for trach decannulation , readmitted in 2022 with recurrent COVID-19 infection, initially in distributive shock. Blood cultures were also positive for serratia marcescens which he has had in the past. seen by heart failure team patient is DNR/DNI  continue on 3 liter nasal canula flat Affect , hyperkalemia lokelma , runs of V-Tach. more stable  very weak , non verbal non communicative on tube feeding , leg booting, CT scan of chest mucous bulging  on Antibiotic and incentive spirometry  , patient receiving one unit of blood transfusion , sleeping at round .RT opacity infiltrate on Antibiotic  , condition is the same ,wound care note appreciated , NG tube was replaced continue on tube feeding .wound care follow up appreciated          (2022 22:20)    PAST MEDICAL & SURGICAL HISTORY:  CHF (congestive heart failure)    CAD (coronary artery disease)  Depression    Pleural effusion    History of 2019 novel coronavirus disease (COVID-19)  2020    Hemorrhoids    Bleeding hemorrhoids    Peripheral arterial disease    Claudication    BPH with urinary obstruction    ACC/AHA stage D systolic heart failure    Anticoagulation goal of INR 2.0 to 2.5    Falls    Clavicle fracture    CKD (chronic kidney disease), stage III    Iron deficiency anemia    H/O epistaxis    Vertigo    GI bleed    S/P TVR (tricuspid valve repair)    S/P ventricular assist device    S/P endoscopy    H/O tracheostomy        FAMILY HISTORY:    Social History:    Marital Status:  X    (   ) Single    (   )    (  )   Occupation: Retired   Lives with: (  ) alone  (  ) children  X spouse   (  ) parents  (  ) other    Substance Use (street drugs): X never used  (  ) other:  Tobacco Usage: X never smoked   (   ) former smoker   (   ) current smoker  (     ) pack year  (    ) last cigarette date  Alcohol Usage: Social         OBJECTIVE:  ICU Vital Signs Last 24 Hrs  T(C): 37.2 (01 Mar 2022 16:00), Max: 38.4 (01 Mar 2022 09:00)  T(F): 99 (01 Mar 2022 16:00), Max: 101.1 (01 Mar 2022 09:00)  HR: 109 (01 Mar 2022 18:00) (67 - 124)  BP: --  BP(mean): 86 (2022 20:00) (86 - 86)  ABP: 89/74 (01 Mar 2022 18:00) (75/63 - 160/85)  ABP(mean): 81 (01 Mar 2022 18:00) (68 - 139)  RR: 38 (01 Mar 2022 18:00) (5 - 40)  SpO2: 99% (01 Mar 2022 18:00) (94% - 100%)       @ 07: @ 07:00  --------------------------------------------------------  IN: 1060.7 mL / OUT: 1600 mL / NET: -539.3 mL     @ 07:01   @ 18:22  --------------------------------------------------------  IN: 677.8 mL / OUT: 455 mL / NET: 222.8 mL    PHYSICAL EXAM :Daily Height in cm: 177.8 (2022 21:20)  Elderly frail male on 3 liter nasal cannula    Daily Weight in k.7 (2022 21:20)  HEENT:     + NCAT  + EOMI  - Conjuctival edema   - Icterus   - Thrush   - ETT  - NGT/OGT  Neck:         + FROM RT IJ  JVD     - Nodes     - Masses    + Mid-line trachea   - Tracheostomy  Chest:           mild kyphosis   Lungs:          + CTA  + Rhonchi  + Rales    - Wheezing  + Decreased BS   - Dullness R L  Cardiac:       + S1 + S2    + RRR   - Irregular   - S3  - S4    - Murmurs   - Rub   - Hamman’s sign   Abdomen:    + BS     + Soft    + Non-tender  - Distended  - Organomegaly  - PEG Cholecystostomy tube in place   Extremities:   - Cyanosis U/L   - Clubbing  U/L  - LE/UE Edema   + Capillary refill    + Pulses   Neuro:        + Awake   +  Alert   - Confused   - Lethargic   - Sedated   - Generalized Weakness  Skin:        - Rashes    - Erythema   + Normal incisions   + IV sites intact + sacral and bilateral wound       HOSPITAL MEDICATIONS: All mediciations reviewed and analyzed  MEDICATIONS  (STANDING):  albumin human  5% IVPB 250 milliLiter(s) IV Intermittent once  cefepime   IVPB 2000 milliGRAM(s) IV Intermittent every 12 hours  chlorhexidine 2% Cloths 1 Application(s) Topical <User Schedule>  cholecalciferol 1000 Unit(s) Oral daily  dextrose 40% Gel 15 Gram(s) Oral once  dextrose 50% Injectable 25 Gram(s) IV Push once  gabapentin Solution 100 milliGRAM(s) Oral three times a day  glucagon  Injectable 1 milliGRAM(s) IntraMuscular once  insulin lispro (ADMELOG) corrective regimen sliding scale   SubCutaneous every 6 hours  magnesium sulfate  IVPB 1 Gram(s) IV Intermittent once  methimazole 10 milliGRAM(s) Oral daily  metoclopramide 10 milliGRAM(s) Oral four times a day  mexiletine 150 milliGRAM(s) Oral every 8 hours  mirtazapine 7.5 milliGRAM(s) Oral at bedtime  multivitamin 1 Tablet(s) Oral daily  pantoprazole  Injectable 40 milliGRAM(s) IV Push daily  potassium phosphate IVPB 15 milliMole(s) IV Intermittent once  senna 2 Tablet(s) Oral at bedtime  sertraline 100 milliGRAM(s) Oral daily  vancomycin  IVPB 750 milliGRAM(s) IV Intermittent every 24 hours  vasopressin Infusion 0.02 Unit(s)/Min (1.2 mL/Hr) IV Continuous <Continuous>    MEDICATIONS  (PRN):    LABS: All Lab data reviewed and analyzed                        97    1060 )-----------( 140      ( 25 Mar 2022 09:23 )             29.4   03-    136  |  103  |  36<H>  ----------------------------<  139<H>  4.5   |  26  |  0.69    Ca    9.1      25 Mar 2022 09:23  Mg     2.0     03-24      Ca    9.3      23 Mar 2022 04:22  Phos  2.3     03-  Mg     1.5     -    TPro  9.1<H>  /  Alb  3.0<L>  /  TBili  0.4  /  DBili  x   /  AST  35  /  ALT  42  /  AlkPhos  220<H>                 25.2    Ca    9.4      21 Mar 2022 06:29  Phos  2.2     03-21  Mg     1.7     -    TPro  8.9<H>  /  Alb  3.1<L>  /  TBili  0.7  /  DBili  x   /  AST  49<H>  /  ALT  58<H>  /  AlkPhos  206<H>        TPro  9.4<H>  /  Alb  3.2<L>  /  TBili  0.4  /  DBili  x   /  AST  35  /  ALT  24  /  AlkPhos  207<H>  03-16             PTT - ( 01 Mar 2022 00:08 )  PTT:25.0 sec LIVER FUNCTIONS - ( 01 Mar 2022 17:38 )  Alb: 2.9 g/dL / Pro: 7.3 g/dL / ALK PHOS: 116 U/L / ALT: 30 U/L / AST: 29 U/L / GGT: x           RADIOLOGY: - Reviewed and analyzed

## 2022-03-25 NOTE — PROGRESS NOTE ADULT - ASSESSMENT
Hyperglycemia: FS in acceptable range,  will FU  Hyponatremia: Patient is euthyroid and not adrenally insufficient, has SIADH, on fluid restriction, hyponatremia improved,  FU   CHF: continue treatment, cardiology team FU 5

## 2022-03-25 NOTE — PROGRESS NOTE ADULT - PROBLEM SELECTOR PLAN 1
- 2/28 BCx + serratia/GNR  - WBC overall improving now with broaden antibiotics.   - CT C/A/P 3/18 concerning for mucous plugging with possible aspiration PNA  - palliative care following, family meeting held on 3/17. At this time the family wishes to continue with course of treatment and would like PEG to be placed. ID has cleared patient to undergo PEG placement however as per GI and IR team patient is high risk to undergo procedure.  - Family meeting schedule for today at 3pm.

## 2022-03-25 NOTE — PROGRESS NOTE ADULT - ASSESSMENT
65 y/o male pt with right heel DTI present on admission  - pt seen and evaluated  - small blister noted centrally at the heel, no signs of infection   - right heel DTI with no signs of infection noted  - rec z flow boots in bed at all times  - rec applying betadine to right heel daily  - will monitor periodically during this admission, reconsult sooner as necessary

## 2022-03-25 NOTE — GOALS OF CARE CONVERSATION - ADVANCED CARE PLANNING - CONVERSATION/DISCUSSION
Diagnosis/Prognosis/Treatment Options
Diagnosis/Treatment Options
Diagnosis/Treatment Options
Diagnosis/Prognosis/MOLST Discussed
Diagnosis/Prognosis/Treatment Options
Diagnosis/Prognosis/Treatment Options

## 2022-03-25 NOTE — PROGRESS NOTE ADULT - PROBLEM SELECTOR PLAN 5
Intermittent H/H drops requiring transfusions  Transfused 2 units PRBCs yesterday into today for Hgb 5.5, post transfusion Hgb 9.7  High risk for EGD/colonoscopy  Daily CBC

## 2022-03-25 NOTE — PROGRESS NOTE ADULT - SUBJECTIVE AND OBJECTIVE BOX
Subjective: Patient seen and examined resting in bed. Family meeting schedule for today    Medications:  ascorbic acid 500 milliGRAM(s) Oral daily  chlorhexidine 2% Cloths 1 Application(s) Topical <User Schedule>  cholecalciferol 2000 Unit(s) Oral daily  insulin lispro (ADMELOG) corrective regimen sliding scale   SubCutaneous every 6 hours  meropenem  IVPB 1000 milliGRAM(s) IV Intermittent every 8 hours  metoprolol tartrate 50 milliGRAM(s) Oral two times a day  mexiletine 150 milliGRAM(s) Oral every 8 hours  multivitamin 1 Tablet(s) Oral daily  pantoprazole  Injectable 40 milliGRAM(s) IV Push two times a day  polyethylene glycol 3350 17 Gram(s) Oral daily PRN  senna 2 Tablet(s) Oral at bedtime  sertraline 100 milliGRAM(s) Oral daily  sodium chloride 0.9% Bolus 250 milliLiter(s) IV Bolus once  thiamine 100 milliGRAM(s) Oral daily  traMADol 25 milliGRAM(s) Oral once      Vitals:  Vital Signs Last 24 Hours  T(C): 36.7 (22 @ 05:53), Max: 37.1 (22 @ 16:10)  HR: 121 (22 @ 05:53) (91 - 128)  BP: 95/69 (22 @ 05:53) (80/58 - 99/54)  RR: 20 (22 @ 05:53) (18 - 20)  SpO2: 100% (22 @ 05:53) (97% - 100%)    Weight in k.7 ( @ 07:51)    I&O's Summary    24 Mar 2022 07:01  -  25 Mar 2022 07:00  --------------------------------------------------------  IN: 0 mL / OUT: 1400 mL / NET: -1400 mL        Physical Exam  General: resting in bed, frail  Neck: Neck supple. JVP not elevated. No masses  Chest: Clear to auscultation bilaterally  CV: +VAD hum  Abdomen: Soft, non-distended, non-tender, +keotube   Neurology: awake but not following commands and remains non verbal     LVAD Interrogation  VAD TYPE HM 2  Speed 9200  Flow 5.7  Power 5.8  PI  5.5  Assessment of driveline exit site: driveline dressing c/d/i  Programming changes: no changes made    Labs:                        5.5    7.79  )-----------( 135      ( 24 Mar 2022 08:25 )             17.0     03-    133<L>  |  100  |  45<H>  ----------------------------<  149<H>  4.1   |  26  |  0.66    Ca    8.8      24 Mar 2022 06:30  Mg     2.0                             Imaging Studies  Subjective: Patient seen and examined resting in bed. Family meeting schedule for today    Medications:  ascorbic acid 500 milliGRAM(s) Oral daily  chlorhexidine 2% Cloths 1 Application(s) Topical <User Schedule>  cholecalciferol 2000 Unit(s) Oral daily  insulin lispro (ADMELOG) corrective regimen sliding scale   SubCutaneous every 6 hours  meropenem  IVPB 1000 milliGRAM(s) IV Intermittent every 8 hours  metoprolol tartrate 50 milliGRAM(s) Oral two times a day  mexiletine 150 milliGRAM(s) Oral every 8 hours  multivitamin 1 Tablet(s) Oral daily  pantoprazole  Injectable 40 milliGRAM(s) IV Push two times a day  polyethylene glycol 3350 17 Gram(s) Oral daily PRN  senna 2 Tablet(s) Oral at bedtime  sertraline 100 milliGRAM(s) Oral daily  sodium chloride 0.9% Bolus 250 milliLiter(s) IV Bolus once  thiamine 100 milliGRAM(s) Oral daily  traMADol 25 milliGRAM(s) Oral once      Vitals:  Vital Signs Last 24 Hours  T(C): 36.7 (22 @ 05:53), Max: 37.1 (22 @ 16:10)  HR: 121 (22 @ 05:53) (91 - 128)  BP: 95/69 (22 @ 05:53) (80/58 - 99/54)  RR: 20 (22 @ 05:53) (18 - 20)  SpO2: 100% (22 @ 05:53) (97% - 100%)    Weight in k.7 ( @ 07:51)    I&O's Summary    24 Mar 2022 07:01  -  25 Mar 2022 07:00  --------------------------------------------------------  IN: 0 mL / OUT: 1400 mL / NET: -1400 mL        Physical Exam  General: resting in bed, frail  Neck: Neck supple. JVP not elevated. No masses  Chest: Clear to auscultation bilaterally  CV: +VAD hum  Abdomen: Soft, non-distended, non-tender, +keotube   Neurology: awake but not following commands and remains non verbal     LVAD Interrogation  VAD TYPE HM 2  Speed 9200  Flow 5.7  Power 5.8  PI  5.5  Assessment of driveline exit site: driveline dressing c/d/i  Programming changes: no changes made    Labs:                        5.5    7.79  )-----------( 135      ( 24 Mar 2022 08:25 )             17.0         133<L>  |  100  |  45<H>  ----------------------------<  149<H>  4.1   |  26  |  0.66    Ca    8.8      24 Mar 2022 06:30  Mg     2.0

## 2022-03-25 NOTE — GOALS OF CARE CONVERSATION - ADVANCED CARE PLANNING - NS PRO AD PATIENT TYPE ON CHART
Health Care Proxy (HCP)/Medical Orders for Life-Sustaining Treatment (MOLST)
Do Not Resuscitate (DNR)/Medical Orders for Life-Sustaining Treatment (MOLST)
Health Care Proxy (HCP)/Medical Orders for Life-Sustaining Treatment (MOLST)
Health Care Proxy (HCP)/Medical Orders for Life-Sustaining Treatment (MOLST)
Health Care Proxy (HCP)/Do Not Resuscitate (DNR)/Medical Orders for Life-Sustaining Treatment (MOLST)

## 2022-03-25 NOTE — GOALS OF CARE CONVERSATION - ADVANCED CARE PLANNING - NS PRO AD PATIENT TYPE
Health Care Proxy (HCP)/Do Not Resuscitate (DNR)/Medical Orders for Life-Sustaining Treatment (MOLST)
Health Care Proxy (HCP)/Medical Orders for Life-Sustaining Treatment (MOLST)
Health Care Proxy (HCP)/Do Not Resuscitate (DNR)/Medical Orders for Life-Sustaining Treatment (MOLST)
Surgeon/Pathologist Verbiage (Will Incorporate Name Of Surgeon From Intro If Not Blank): operated in two distinct and integrated capacities as the surgeon and pathologist.

## 2022-03-25 NOTE — PROGRESS NOTE ADULT - ASSESSMENT
66 years old male with PMHx of Stage D HF 2/2 NICM s/p HM2 LVAD in 9/2017 at  (due to severe PAD) with TV ring, multiple GI bleeds, no AC, CKD 3 prior COVID-19 infection in 4/2020, chronic cholecystitis s/p percutaneous cholecystostomy tube, recurrent COVID infection, Serratia bacteremia transferred from [facility] for sepsis.    ID is consulted for septic shock  Recently had Serratia bacteremia discharged on suppressive PO Levaquin  Returned with leukocytosis, fever, AMS, hypotension requiring pressors  ?purulent sputum coming out from previous trach site  CXR no PNA  CTH unrevealing  LVAD exit sites shows no signs of infection  Perc dawn tube site shows no signs of infection  BCx Serratia, S cefepime and ertapenem, R quinolones  CT showed abdominal aortic thrombus and a 2.6cm splenic lesion    Antibiotics:  Vancomycin 2/28 - 3/1  Zosyn 2/28  Cefepime 2/28 -     Currently unclear etiology of septic shock likely secondary to recurrent serratia bacteremia  Source of serratia is could be LVAD, infected aortic thrombus or splenic lesion  Regardless will treat this as in intravascular infection so likely 4 - 6 weeks in total    IMPRESSION:  Septic shock 2/2 serratia bacteremia  Aortic thrombus  Splenic lesion  Leukocytosis  Fever  LVAD      RECOMMENDATIONS:  Patient with new fevers, tachycardia, tachypnea    differential aspiration event in the setting of NG feeds, bacteremia secondary to a line source, COVID acquisition intra-abdominal process    Sent blood cultures x2 sets 3/17 with NGTD  CT of chest suggestive of mucus plugging with possible aspiration pneumonia event  Can discontinue the Vancomycin as no evidence of MRSA on blood cultures  If blood cultures are finalized from 3/17 as negative would discontinue the Vancomycin and complete a course of Meorpenem x7days prior to de-escalate back to cover prior serratia bacteremia /driveline infection    Patient can undergo placement of PEG if aligned with C        Morris Prater MD  Can be called via Teams  After 5pm/weekends [phone]   66 years old male with PMHx of Stage D HF 2/2 NICM s/p HM2 LVAD in 9/2017 at  (due to severe PAD) with TV ring, multiple GI bleeds, no AC, CKD 3 prior COVID-19 infection in 4/2020, chronic cholecystitis s/p percutaneous cholecystostomy tube, recurrent COVID infection, Serratia bacteremia transferred from [facility] for sepsis.    ID is consulted for septic shock  Recently had Serratia bacteremia discharged on suppressive PO Levaquin  Returned with leukocytosis, fever, AMS, hypotension requiring pressors  ?purulent sputum coming out from previous trach site  CXR no PNA  CTH unrevealing  LVAD exit sites shows no signs of infection  Perc dawn tube site shows no signs of infection  BCx Serratia, S cefepime and ertapenem, R quinolones  CT showed abdominal aortic thrombus and a 2.6cm splenic lesion    Antibiotics:  Vancomycin 2/28 - 3/1  Zosyn 2/28  Cefepime 2/28 -     Currently unclear etiology of septic shock likely secondary to recurrent serratia bacteremia  Source of serratia is could be LVAD, infected aortic thrombus or splenic lesion  Regardless will treat this as in intravascular infection so likely 4 - 6 weeks in total    IMPRESSION:  Septic shock 2/2 serratia bacteremia  Aortic thrombus  Splenic lesion  Leukocytosis  Fever  LVAD      RECOMMENDATIONS:  Patient with new fevers, tachycardia, tachypnea    differential aspiration event in the setting of NG feeds, bacteremia secondary to a line source, COVID acquisition intra-abdominal process    Sent blood cultures x2 sets 3/17 with NGTD  CT of chest suggestive of mucus plugging with possible aspiration pneumonia event  Can discontinue the Vancomycin as no evidence of MRSA on blood cultures  If blood cultures are finalized from 3/17 as negative would discontinue the Vancomycin and complete a course of Meorpenem x7days prior to de-escalate back to cover prior serratia bacteremia /driveline infection    Patient's PEG  placement deferred by GI given overall state of health        Morris Prater MD  Can be called via Teams  After 5pm/weekends [phone]

## 2022-03-25 NOTE — GOALS OF CARE CONVERSATION - ADVANCED CARE PLANNING - STAFF/PROVIDER
Dr. Martinez, Dr. Vu,CLAUDIA Bazzi.
This writer
Dr. Oneill, CLAUDIA Bazzi, Tamanna Mcclendon,
Dr. Burgos of [facility], Dr. Jensen of [facility]

## 2022-03-25 NOTE — PROGRESS NOTE ADULT - ASSESSMENT
Assessment and Recommendation:   · Assessment	  Assessment and recommendation :  Acute hypercapnic respiratory Failure on 3 liter on nasal cannula    S/P Septic shock   Bacteremia with serratia marcescens  mucous plug on antibiotic hold on PEG   Severe anemia with low H/H   S/P transfuse another  unit of Packed RBCs   Severe ischemic cardiomyopathy   ACC/AHA stage D systolic heart failure  severe ischemic cardiomyopathy EF 10%  wound care for sacral and bilateral buttock wound   Peripheral vascular Disease   PAF on no ACO   One RT LL opacity improved will clear for PEG placement   H/O of repeated GI bleeding   S/P mesenteric ischemia   GERD on Reglan   S/P HM2 LVAD , VT on Mexiletine   hyperthyroidism on  methimazole   severe protein caloric malnutrition   severe neuropathy   Major depression   Continue Antibiotic meropenem  wound care follow up   NG tube feeding   GI prophylaxis   patient is DNR/DNI

## 2022-03-25 NOTE — PROGRESS NOTE ADULT - SUBJECTIVE AND OBJECTIVE BOX
[facility]-- WOUND TEAM -- FOLLOW UP NOTE  --------------------------------------------------------------------------------    24 hour events/subjective:    incontinent   tolerating TF  afebrile  unable to offer complaints  ongoing GOC- family mtg planned for this afternoon      Diet:  Diet, NPO with Tube Feed:   Tube Feeding Modality: Nasogastric  Jevity 1.5 Tank (JEVITY1.5RTH)  Total Volume for 24 Hours (mL): 1080  Continuous  Starting Tube Feed Rate mL per Hour: 20  Increase Tube Feed Rate by (mL): 10     Every 4 hours  Until Goal Tube Feed Rate (mL per Hour): 45  Tube Feed Duration (in Hours): 24  Tube Feed Start Time: 17:00  Reji(7 Gm Arginine/7 Gm Glut/1.2 Gm HMB     Qty per Day:  2  No Carb Prosource TF     Qty per Day:  1 (03-20-22 @ 18:15)      ROS: pt unable to offer    ALLERGIES & MEDICATIONS  --------------------------------------------------------------------------------  Allergies  Amiodarone Hydrochloride (Other (Severe))        STANDING INPATIENT MEDICATIONS  ascorbic acid 500 milliGRAM(s) Oral daily  cefepime   IVPB 1000 milliGRAM(s) IV Intermittent every 12 hours  chlorhexidine 2% Cloths 1 Application(s) Topical <User Schedule>  cholecalciferol 2000 Unit(s) Oral daily  insulin lispro (ADMELOG) corrective regimen sliding scale   SubCutaneous every 6 hours  metoprolol tartrate 50 milliGRAM(s) Oral two times a day  mexiletine 150 milliGRAM(s) Oral every 8 hours  multivitamin 1 Tablet(s) Oral daily  pantoprazole  Injectable 40 milliGRAM(s) IV Push two times a day  senna 2 Tablet(s) Oral at bedtime  sertraline 100 milliGRAM(s) Oral daily  sodium chloride 0.9% Bolus 250 milliLiter(s) IV Bolus once  thiamine 100 milliGRAM(s) Oral daily  traMADol 25 milliGRAM(s) Oral once      PRN INPATIENT MEDICATION  polyethylene glycol 3350 17 Gram(s) Oral daily PRN        VITALS/PHYSICAL EXAM  --------------------------------------------------------------------------------  T(C): 36.7 (03-25-22 @ 12:32), Max: 37.1 (03-24-22 @ 16:10)  HR: 124 (03-25-22 @ 09:17) (105 - 128)  BP: 94/74 (03-25-22 @ 09:18) (80/58 - 99/54)  RR: 18 (03-25-22 @ 09:17) (18 - 20)  SpO2: 100% (03-25-22 @ 09:17) (97% - 100%)  Wt(kg): --        03-24-22 @ 07:01  -  03-25-22 @ 07:00  --------------------------------------------------------  IN: 0 mL / OUT: 1400 mL / NET: -1400 mL    General:  NAD,  frail, alert  Respiratory: no SOB on supplemental O2  Cardiac: (+) Rt sided LVAD  Gastrointestinal: soft NT/ND (+)PCT (+)NGT  Neurology: nonverbal, not following commands  Psych: difficult to assess  Musculoskeletal: Passive ROM, no contractures  Vascular: equally warm, no c/c/e     (+)Rt heel wound as per DPM  Skin: moist w/ good turgor  Lateral Rt hip '>' shaped purple linear wound w/o open skin, blistering, or drainage in 4cm x 4cm x 0cm area  Right buttocks with evolving DTI unstageable 5cm x 4cm and  unstageable w/ soft eschar 2cm x 2cm   Left buttocks with superficially denuded skin  4cm x 5cm x 0.1   No active drainage, odor, erythema, increased warmth, tenderness, induration, fluctuance, nor crepitus      LABS/ CULTURES/ RADIOLOGY:              9.7    10.60 >-----------<  140      [03-25-22 @ 09:23]              29.4     136  |  103  |  36  ----------------------------<  139      [03-25-22 @ 09:23]  4.5   |  26  |  0.69        Ca     9.1     [03-25-22 @ 09:23]      Mg     2.0     [03-24-22 @ 06:30]      CAPILLARY BLOOD GLUCOSE  POCT Blood Glucose.: 107 mg/dL (25 Mar 2022 11:57)  POCT Blood Glucose.: 134 mg/dL (25 Mar 2022 05:55)  POCT Blood Glucose.: 135 mg/dL (24 Mar 2022 23:50)  POCT Blood Glucose.: 132 mg/dL (24 Mar 2022 17:56)    A1C with Estimated Average Glucose Result: 5.3 % (03-01-22 @ 22:11)

## 2022-03-25 NOTE — PROGRESS NOTE ADULT - ASSESSMENT
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 s/p HM2 LVAD in 9/2017 at  (due to severe PAD) with TV ring, multiple GI bleeds, thus maintained off AC, prior COVID-19 infection in 4/2020, recurrent syncope post LVAD s/p ILR, prolonged complex hospitalization from 6/14-11/16/2021 with GIB, respiratory failure s/p trach decanulated 12/2021, multiple infections, s/p cholecystotomy tube and SMA stent 10/2021, recurrent COVID 19 reinfection s/p MAB/remdesivir/steroids and distributive shock with serratia bacteremia, recurrent VT on mexiletine transferred from [facility] ED with metabolic encephalopathy, hypercarbic respiratory failure weaned off bipap, septic shock requiring pressors (now on midodrine) with serratia bacteremia on cefepime, intermittent h/h drop s/p total 3units prbc (last 3/6), episodes of recurrent VT s/p lidocaine gtt now on mexiletine. Receiving NGT feedings, per GI/IR too high risk for PEG placement

## 2022-03-25 NOTE — PROGRESS NOTE ADULT - SUBJECTIVE AND OBJECTIVE BOX
Patient is a 66y old  Male who presents with a chief complaint of Septic shock (25 Mar 2022 19:54)       INTERVAL HPI/OVERNIGHT EVENTS:  Patient seen and evaluated at bedside.  Pt is resting comfortable in NAD.     Allergies    Amiodarone Hydrochloride (Other (Severe))    Intolerances        Vital Signs Last 24 Hrs  T(C): 36.7 (25 Mar 2022 20:26), Max: 36.8 (25 Mar 2022 09:17)  T(F): 98 (25 Mar 2022 20:26), Max: 98.2 (25 Mar 2022 09:17)  HR: 89 (25 Mar 2022 20:26) (89 - 128)  BP: 93/69 (25 Mar 2022 17:30) (80/58 - 99/54)  BP(mean): 77 (25 Mar 2022 17:30) (65 - 81)  RR: 18 (25 Mar 2022 20:26) (16 - 20)  SpO2: 100% (25 Mar 2022 20:26) (97% - 100%)    LABS:                        9.7    10.60 )-----------( 140      ( 25 Mar 2022 09:23 )             29.4     03-25    136  |  103  |  36<H>  ----------------------------<  139<H>  4.5   |  26  |  0.69    Ca    9.1      25 Mar 2022 09:23  Mg     2.0     03-24          CAPILLARY BLOOD GLUCOSE      POCT Blood Glucose.: 128 mg/dL (25 Mar 2022 17:18)  POCT Blood Glucose.: 107 mg/dL (25 Mar 2022 11:57)  POCT Blood Glucose.: 134 mg/dL (25 Mar 2022 05:55)  POCT Blood Glucose.: 135 mg/dL (24 Mar 2022 23:50)      Lower Extremity Physical Exam:  Vasular: DP/PT 0/4, B/L, CFT <3 seconds B/L, Temperature gradient wnl, B/L.   Neuro: Epicritic sensation intact to the level of foot, B/L.  Musculoskeletal/Ortho: painful contracted hammertoes 2-5 bilat  Skin: right heel DTI present on admission

## 2022-03-25 NOTE — PROGRESS NOTE ADULT - ASSESSMENT
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    S/p prolonged complex hospitalization from 6/14-11/16/2021 initially admitted with abdominal pain complicated by GIB, respiratory failure s/p trach, multiple infections, s/p cholecystotomy tube and SMA stent 10/2021, ultimately discharged to [facility] rehab and then returned to [facility] for trach decannulation 12/2. The patient had a recent admission with COVID 19 reinfection and distributive shock. That admission he had blood culture positive for serratia marcescens (discharged on levaquin).  He was treated with MAB, remdesivir, and steroids. He had recurrent VT and was started mexiletine.     Now coming in from [facility] ED with leukocytosis and AMS was treated for UTI. Initially required bipap but was weaned off here. Labs concerning here for WBC 26, hemoglobin of 7s then 6.7, creatinine of 1.3. His maps were initially in the 50s. Physical exam showing pus from stoma, sat of central access of 81.8, and tachycardia are all concerning for septic shock picture. He is s/p 1L fluids, 1uPRBCs, was placed on vaso. Patient has known episodes of VT and had recurrent NSVT/VT when he first arrived started on lidocaine infusion with improvement. Of note he was previously on mexiletine 150 TID outpatient, metoprolol ER 25 qAM and 50 qPM. Previous history of thyrotoxicosis on amiodarone.    He was found to be bacteremic with recurrent serratia, on IV abx possibly r/t LVAD infection vs splenic abscess noted on CT 1/19. Afebrile since 3/1. Vasopressin weaned off 3/4, started on midodrine which has now been off since 3/8. LVAD without s/s of pump malfunction. Currently he opens his eyes however remains nonverbal and not following commands. His mental status is still not back to baseline. His VT has now subsided, last episode was noted on 3/12.  His hyponatremia and hyperkalemia are overall improving after receiving the following treatment insulin/D50 and lokelma.  Recently his hemoglobin has continued to drop, will be transfused with 1 uPRBC on 3/16. Overall fairly stable from HF perspective. 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 After family discussion on 3/17, family wishes to continues course of treatment and are wanting to proceed with PEG placement however after further discussion with GI and IR patient is not a candidate. A new family meeting is schedule for this afternoon.

## 2022-03-26 NOTE — PROGRESS NOTE ADULT - SUBJECTIVE AND OBJECTIVE BOX
[facility] Division of Hospital Medicine  Venessa Dalal DO available via [employer] teams     Patient is a 66y old  Male who presents with a chief complaint of Septic shock (26 Mar 2022 11:00)      SUBJECTIVE / OVERNIGHT EVENTS:  No acute overnight events.     MEDICATIONS  (STANDING):  ascorbic acid 500 milliGRAM(s) Oral daily  cefepime   IVPB 1000 milliGRAM(s) IV Intermittent every 12 hours  chlorhexidine 2% Cloths 1 Application(s) Topical <User Schedule>  cholecalciferol 2000 Unit(s) Oral daily  insulin lispro (ADMELOG) corrective regimen sliding scale   SubCutaneous every 6 hours  metoprolol tartrate 50 milliGRAM(s) Oral two times a day  mexiletine 150 milliGRAM(s) Oral every 8 hours  multivitamin 1 Tablet(s) Oral daily  pantoprazole  Injectable 40 milliGRAM(s) IV Push two times a day  senna 2 Tablet(s) Oral at bedtime  sertraline 100 milliGRAM(s) Oral daily  sodium chloride 0.9% Bolus 250 milliLiter(s) IV Bolus once  thiamine 100 milliGRAM(s) Oral daily  traMADol 25 milliGRAM(s) Oral once    MEDICATIONS  (PRN):  HYDROmorphone  Injectable 0.2 milliGRAM(s) IV Push every 2 hours PRN Moderate to Severe pain  polyethylene glycol 3350 17 Gram(s) Oral daily PRN Constipation      CAPILLARY BLOOD GLUCOSE      POCT Blood Glucose.: 147 mg/dL (26 Mar 2022 17:28)  POCT Blood Glucose.: 152 mg/dL (26 Mar 2022 11:35)  POCT Blood Glucose.: 142 mg/dL (26 Mar 2022 07:35)  POCT Blood Glucose.: 134 mg/dL (26 Mar 2022 00:22)    I&O's Summary    25 Mar 2022 07:01  -  26 Mar 2022 07:00  --------------------------------------------------------  IN: 695 mL / OUT: 1450 mL / NET: -755 mL    26 Mar 2022 07:01  -  26 Mar 2022 17:37  --------------------------------------------------------  IN: 0 mL / OUT: 575 mL / NET: -575 mL        PHYSICAL EXAM:  Vital Signs Last 24 Hrs  T(C): 36.4 (26 Mar 2022 12:58), Max: 36.7 (25 Mar 2022 20:26)  T(F): 97.5 (26 Mar 2022 12:58), Max: 98 (25 Mar 2022 20:26)  HR: 95 (26 Mar 2022 12:58) (89 - 107)  BP: 99/67 (26 Mar 2022 05:43) (91/68 - 101/67)  BP(mean): 80 (26 Mar 2022 12:58) (76 - 90)  RR: 20 (26 Mar 2022 12:58) (18 - 24)  SpO2: 99% (26 Mar 2022 12:58) (98% - 100%)    CONSTITUTIONAL: NAD, cachetic. Has NGT  RESPIRATORY: Normal respiratory effort; lungs are clear to auscultation bilaterally  CARDIOVASCULAR: Regular rate and rhythm, normal S1 and S2, no murmur/rub/gallop; No lower extremity edema; Peripheral pulses are 2+ bilaterally  ABDOMEN: Nontender to palpation, normoactive bowel sounds, no rebound/guarding  MUSCULOSKELETAL: clubbing or cyanosis of digits; no joint swelling or tenderness to palpation  PSYCH: AOx 0        LABS:                        8.9    9.62  )-----------( 145      ( 26 Mar 2022 05:20 )             28.1     03-26    135  |  101  |  33<H>  ----------------------------<  147<H>  4.8   |  26  |  0.66    Ca    9.3      26 Mar 2022 05:20  Mg     1.9     03-26                  RADIOLOGY & ADDITIONAL TESTS:  Results Reviewed:   Imaging Personally Reviewed:  Electrocardiogram Personally Reviewed:    COORDINATION OF CARE:  Care Discussed with Consultants/Other Providers [Y/N]:  Prior or Outpatient Records Reviewed [Y/N]:

## 2022-03-26 NOTE — PROGRESS NOTE ADULT - PROBLEM SELECTOR PLAN 9
- currently being fed via keotube  - at this time patient is unable to tolerate PO diet. Family wishes to proceed with PEG placement.   - GI following, will need to determine date of PEG placement (of note will need to be booked with cardiac anesthesia)  - No absolute contraindication to placing a PEG from an cardiac perspective however as stated above GI and IR state patient is to high risk.  - Family meeting as stated above - currently being fed via keotube  - at this time patient is unable to tolerate PO diet. Family wishes to proceed with PEG placement.   - GI following, will need to determine date of PEG placement (of note will need to be booked with cardiac anesthesia)  - Aas stated above GI and IR teams consider patient is too high risk.  - Family meeting as stated above

## 2022-03-26 NOTE — PROGRESS NOTE ADULT - PROBLEM SELECTOR PLAN 1
- 2/28 BCx + serratia/GNR  - WBC overall improving now with broaden antibiotics.   - CT C/A/P 3/18 concerning for mucous plugging with possible aspiration PNA  - palliative care following, family meeting held on 3/17. At this time the family wishes to continue with course of treatment and would like PEG to be placed. ID has cleared patient to undergo PEG placement however as per GI and IR team patient is high risk to undergo procedure.  - Family meeting schedule for today at 3pm. - 2/28 BCx + serratia/GNR  - WBC overall improving now with broaden antibiotics.   - CT C/A/P 3/18 concerning for mucous plugging with possible aspiration PNA  - palliative care following, family meeting held on 3/17. At this time the family wishes to continue with course of treatment. ID has cleared patient to undergo PEG placement however as per GI and IR team patient is high risk to undergo procedure.  - Family meeting 3/26 - plan remains unchanged. His family is waiting for God to decide until when he will be with us.

## 2022-03-26 NOTE — PROGRESS NOTE ADULT - ASSESSMENT
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    S/p prolonged complex hospitalization from 6/14-11/16/2021 initially admitted with abdominal pain complicated by GIB, respiratory failure s/p trach, multiple infections, s/p cholecystotomy tube and SMA stent 10/2021, ultimately discharged to [facility] rehab and then returned to [facility] for trach decannulation 12/2. The patient had a recent admission with COVID 19 reinfection and distributive shock. That admission he had blood culture positive for serratia marcescens (discharged on levaquin).  He was treated with MAB, remdesivir, and steroids. He had recurrent VT and was started mexiletine.     Now coming in from [facility] ED with leukocytosis and AMS was treated for UTI. Initially required bipap but was weaned off here. Labs concerning here for WBC 26, hemoglobin of 7s then 6.7, creatinine of 1.3. His maps were initially in the 50s. Physical exam showing pus from stoma, sat of central access of 81.8, and tachycardia are all concerning for septic shock picture. He is s/p 1L fluids, 1uPRBCs, was placed on vaso. Patient has known episodes of VT and had recurrent NSVT/VT when he first arrived started on lidocaine infusion with improvement. Of note he was previously on mexiletine 150 TID outpatient, metoprolol ER 25 qAM and 50 qPM. Previous history of thyrotoxicosis on amiodarone.    He was found to be bacteremic with recurrent serratia, on IV abx possibly r/t LVAD infection vs splenic abscess noted on CT 1/19. Afebrile since 3/1. Vasopressin weaned off 3/4, started on midodrine which has now been off since 3/8. LVAD without s/s of pump malfunction. Currently he opens his eyes however remains nonverbal and not following commands. His mental status is still not back to baseline. His VT has now subsided, last episode was noted on 3/12.  His hyponatremia and hyperkalemia are overall improving after receiving the following treatment insulin/D50 and lokelma.  Recently his hemoglobin has continued to drop, will be transfused with 1 uPRBC on 3/16. Overall fairly stable from HF perspective. 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 After family discussion on 3/17, family wishes to continues course of treatment and are wanting to proceed with PEG placement however after further discussion with GI and IR patient is not a candidate. A new family meeting is schedule for this afternoon.   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    S/p prolonged complex hospitalization from 6/14-11/16/2021 initially admitted with abdominal pain complicated by GIB, respiratory failure s/p trach, multiple infections, s/p cholecystotomy tube and SMA stent 10/2021, ultimately discharged to [facility] rehab and then returned to [facility] for trach decannulation 12/2. The patient had a recent admission with COVID 19 reinfection and distributive shock. That admission he had blood culture positive for serratia marcescens (discharged on levaquin).  He was treated with MAB, remdesivir, and steroids. He had recurrent VT and was started mexiletine.     Now coming in from [facility] ED with leukocytosis and AMS was treated for UTI. Initially required bipap but was weaned off here. Labs concerning here for WBC 26, hemoglobin of 7s then 6.7, creatinine of 1.3. His maps were initially in the 50s. Physical exam showing pus from stoma, sat of central access of 81.8, and tachycardia are all concerning for septic shock picture. He is s/p 1L fluids, 1uPRBCs, was placed on vaso. Patient has known episodes of VT and had recurrent NSVT/VT when he first arrived started on lidocaine infusion with improvement. Of note he was previously on mexiletine 150 TID outpatient, metoprolol ER 25 qAM and 50 qPM. Previous history of thyrotoxicosis on amiodarone.    He was found to be bacteremic with recurrent serratia, on IV abx possibly r/t LVAD infection vs splenic abscess noted on CT 1/19. Afebrile since 3/1. Vasopressin weaned off 3/4, started on midodrine which has now been off since 3/8. LVAD without s/s of pump malfunction. Currently he opens his eyes however remains nonverbal and not following commands. His mental status is still not back to baseline. His VT has now subsided, last episode was noted on 3/12.  His hyponatremia and hyperkalemia are overall improving after receiving the following treatment insulin/D50 and lokelma.  Recently his hemoglobin has continued to drop, will be transfused with 1 uPRBC on 3/16. Overall fairly stable from HF perspective. 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 After family discussion on 3/17, family wishes to continues course of treatment and are wanting to proceed with PEG placement however after further discussion with GI and IR patient is not a candidate.

## 2022-03-26 NOTE — PROGRESS NOTE ADULT - PROBLEM SELECTOR PLAN 1
Suspect secondary to infection with diffuse cerebral dysfunction.   Per family meeting 3/17 continue current treatment and obtain PEG if able  Repeat family meeting 3/25 with no change in goals  They are reportedly aware of poor prognosis and functional quadriplegia  DNR/DNI

## 2022-03-26 NOTE — PROGRESS NOTE ADULT - ASSESSMENT
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 s/p HM2 LVAD in 9/2017 at  (due to severe PAD) with TV ring, multiple GI bleeds, thus maintained off AC, prior COVID-19 infection in 4/2020, recurrent syncope post LVAD s/p ILR, prolonged complex hospitalization from 6/14-11/16/2021 with GIB, respiratory failure s/p trach decanulated 12/2021, multiple infections, s/p cholecystotomy tube and SMA stent 10/2021, recurrent COVID 19 reinfection s/p MAB/remdesivir/steroids and distributive shock with serratia bacteremia, recurrent VT on mexiletine transferred from [facility] ED with metabolic encephalopathy, hypercarbic respiratory failure weaned off bipap, septic shock requiring pressors (now on midodrine) with serratia bacteremia on cefepime, intermittent h/h drop s/p total 3units prbc (last 3/6), episodes of recurrent VT s/p lidocaine gtt now on mexiletine. Receiving NGT feedings, per GI/IR too high risk for PEG placement

## 2022-03-26 NOTE — PROGRESS NOTE ADULT - PROBLEM SELECTOR PLAN 5
Intermittent H/H drops requiring transfusions  Transfused 2 units PRBCs 3/24 for Hgb 5.5, post transfusion Hgb 9.7  Today 8.9  High risk for EGD/colonoscopy  Daily CBC

## 2022-03-26 NOTE — PROGRESS NOTE ADULT - SUBJECTIVE AND OBJECTIVE BOX
INCOMPLETE NOTE    Subjective:  No overnight events    Medications:  ascorbic acid 500 milliGRAM(s) Oral daily  cefepime   IVPB 1000 milliGRAM(s) IV Intermittent every 12 hours  chlorhexidine 2% Cloths 1 Application(s) Topical <User Schedule>  cholecalciferol 2000 Unit(s) Oral daily  HYDROmorphone  Injectable 0.2 milliGRAM(s) IV Push every 2 hours PRN  insulin lispro (ADMELOG) corrective regimen sliding scale   SubCutaneous every 6 hours  metoprolol tartrate 50 milliGRAM(s) Oral two times a day  mexiletine 150 milliGRAM(s) Oral every 8 hours  multivitamin 1 Tablet(s) Oral daily  pantoprazole  Injectable 40 milliGRAM(s) IV Push two times a day  polyethylene glycol 3350 17 Gram(s) Oral daily PRN  senna 2 Tablet(s) Oral at bedtime  sertraline 100 milliGRAM(s) Oral daily  sodium chloride 0.9% Bolus 250 milliLiter(s) IV Bolus once  thiamine 100 milliGRAM(s) Oral daily  traMADol 25 milliGRAM(s) Oral once    Vitals:  T(C): 36 (03-26-22 @ 05:43), Max: 36.8 (03-25-22 @ 09:17)  HR: 106 (03-26-22 @ 05:48) (89 - 124)  BP: 99/67 (03-26-22 @ 05:43) (91/68 - 101/67)  BP(mean): 84 (03-26-22 @ 05:48) (76 - 90)  RR: 24 (03-26-22 @ 05:48) (16 - 24)  SpO2: 98% (03-26-22 @ 05:48) (98% - 100%)    Daily     Daily         I&O's Summary    25 Mar 2022 07:01  -  26 Mar 2022 07:00  --------------------------------------------------------  IN: 695 mL / OUT: 1450 mL / NET: -755 mL        Physical Exam:  Appearance: No Acute Distress  HEENT: JVP non elevated  Cardiovascular: RRR, Normal S1 S2, No murmurs/rubs/gallops  Respiratory: Clear to auscultation bilaterally  Gastrointestinal: Soft, Non-tender, non-distended	  Skin: no skin lesions  Neurologic: Non-focal  Extremities: No LE edema, warm and well perfused  Psychiatry: A & O x 3, Mood & affect appropriate      Labs:                        8.9    9.62  )-----------( 145      ( 26 Mar 2022 05:20 )             28.1     03-26    135  |  101  |  33<H>  ----------------------------<  147<H>  4.8   |  26  |  0.66    Ca    9.3      26 Mar 2022 05:20  Mg     1.9     03-26                       Subjective:  No overnight events reported.  Family meeting via phone yesterday.     Medications:  ascorbic acid 500 milliGRAM(s) Oral daily  cefepime   IVPB 1000 milliGRAM(s) IV Intermittent every 12 hours  chlorhexidine 2% Cloths 1 Application(s) Topical <User Schedule>  cholecalciferol 2000 Unit(s) Oral daily  HYDROmorphone  Injectable 0.2 milliGRAM(s) IV Push every 2 hours PRN  insulin lispro (ADMELOG) corrective regimen sliding scale   SubCutaneous every 6 hours  metoprolol tartrate 50 milliGRAM(s) Oral two times a day  mexiletine 150 milliGRAM(s) Oral every 8 hours  multivitamin 1 Tablet(s) Oral daily  pantoprazole  Injectable 40 milliGRAM(s) IV Push two times a day  polyethylene glycol 3350 17 Gram(s) Oral daily PRN  senna 2 Tablet(s) Oral at bedtime  sertraline 100 milliGRAM(s) Oral daily  sodium chloride 0.9% Bolus 250 milliLiter(s) IV Bolus once  thiamine 100 milliGRAM(s) Oral daily  traMADol 25 milliGRAM(s) Oral once    Vitals:  T(C): 36 (03-26-22 @ 05:43), Max: 36.8 (03-25-22 @ 09:17)  HR: 106 (03-26-22 @ 05:48) (89 - 124)  BP: 99/67 (03-26-22 @ 05:43) (91/68 - 101/67)  BP(mean): 84 (03-26-22 @ 05:48) (76 - 90)  RR: 24 (03-26-22 @ 05:48) (16 - 24)  SpO2: 98% (03-26-22 @ 05:48) (98% - 100%)    Daily     Daily         I&O's Summary    25 Mar 2022 07:01  -  26 Mar 2022 07:00  --------------------------------------------------------  IN: 695 mL / OUT: 1450 mL / NET: -755 mL        Physical Exam:  Appearance: No Acute Distress, cachectic/frail  HEENT: JVP non elevated  Cardiovascular: VAD hum  Respiratory: Clear to auscultation bilaterally  Gastrointestinal: Soft, Non-tender, non-distended	  Skin: no skin lesions  Neurologic: Non-focal  Extremities: No LE edema,  Psychiatry: Alert/awake, nonverbal    VAD Interrogation  Type: HM II  No VAD alarms  Speed 9200 rpm  Flow 5.5 lpm  Power 5.6 gallegos  PI 6.4  Assessment of driveline exit: driveline dressing clean/dry/intact    Labs:                        8.9    9.62  )-----------( 145      ( 26 Mar 2022 05:20 )             28.1     03-26    135  |  101  |  33<H>  ----------------------------<  147<H>  4.8   |  26  |  0.66    Ca    9.3      26 Mar 2022 05:20  Mg     1.9     03-26

## 2022-03-26 NOTE — PROGRESS NOTE ADULT - SUBJECTIVE AND OBJECTIVE BOX
RICKY JOINT  MRN#: [id number]  Subjective: pulmonary progress note  : acute hypercapnic respiratory failure . Septic shock , service rendered on 2002   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 s/p HM2 LVAD in 2017 at  (due to severe PAD) with TV ring, multiple GI bleeds, thus maintained off AC, CKD 3prior COVID-19 infection in 2020, recurrent syncope post LVAD s/p ILR, s/p prolonged complex hospitalization from -2021 initially admitted with abdominal pain complicated by GIB, respiratory failure s/p trach, multiple infections, s/p cholecystotomy tube and SMA stent 10/2021, ultimately discharged to [facility] rehab and then returned to [facility] for trach decannulation , readmitted in 2022 with recurrent COVID-19 infection, initially in distributive shock. Blood cultures were also positive for serratia marcescens which he has had in the past. seen by heart failure team patient is DNR/DNI  continue on 3 liter nasal canula flat Affect , hyperkalemia lokelma , runs of V-Tach. more stable  very weak , non verbal non communicative on tube feeding , leg booting, CT scan of chest mucous bulging  on Antibiotic and incentive spirometry  , patient receiving one unit of blood transfusion , sleeping at round .RT opacity infiltrate on Antibiotic  , condition is the same ,wound care note appreciated , NG tube was replaced continue on tube feeding .wound care follow up appreciated , chest x ray no evidence of atelectasis , patient can have PEG placement          (2022 22:20)    PAST MEDICAL & SURGICAL HISTORY:  CHF (congestive heart failure)    CAD (coronary artery disease)  Depression    Pleural effusion    History of 2019 novel coronavirus disease (COVID-19)  2020    Hemorrhoids    Bleeding hemorrhoids    Peripheral arterial disease    Claudication    BPH with urinary obstruction    ACC/AHA stage D systolic heart failure    Anticoagulation goal of INR 2.0 to 2.5    Falls    Clavicle fracture    CKD (chronic kidney disease), stage III    Iron deficiency anemia    H/O epistaxis    Vertigo    GI bleed    S/P TVR (tricuspid valve repair)    S/P ventricular assist device    S/P endoscopy    H/O tracheostomy        FAMILY HISTORY:    Social History:    Marital Status:  X    (   ) Single    (   )    (  )   Occupation: Retired   Lives with: (  ) alone  (  ) children  X spouse   (  ) parents  (  ) other    Substance Use (street drugs): X never used  (  ) other:  Tobacco Usage: X never smoked   (   ) former smoker   (   ) current smoker  (     ) pack year  (    ) last cigarette date  Alcohol Usage: Social         OBJECTIVE:  ICU Vital Signs Last 24 Hrs  T(C): 37.2 (01 Mar 2022 16:00), Max: 38.4 (01 Mar 2022 09:00)  T(F): 99 (01 Mar 2022 16:00), Max: 101.1 (01 Mar 2022 09:00)  HR: 109 (01 Mar 2022 18:00) (67 - 124)  BP: --  BP(mean): 86 (2022 20:00) (86 - 86)  ABP: 89/74 (01 Mar 2022 18:00) (75/63 - 160/85)  ABP(mean): 81 (01 Mar 2022 18:00) (68 - 139)  RR: 38 (01 Mar 2022 18:00) (5 - 40)  SpO2: 99% (01 Mar 2022 18:00) (94% - 100%)       @ 07: @ 07:00  --------------------------------------------------------  IN: 1060.7 mL / OUT: 1600 mL / NET: -539.3 mL     @ 07: @ 18:22  --------------------------------------------------------  IN: 677.8 mL / OUT: 455 mL / NET: 222.8 mL    PHYSICAL EXAM :Daily Height in cm: 177.8 (2022 21:20)  Elderly frail male     Daily Weight in k.7 (2022 21:20)  HEENT:     + NCAT  + EOMI  - Conjuctival edema   - Icterus   - Thrush   - ETT  - NGT/OGT  Neck:         + FROM RT IJ  JVD     - Nodes     - Masses    + Mid-line trachea   - Tracheostomy  Chest:           mild kyphosis   Lungs:          + CTA  + Rhonchi  + Rales    - Wheezing  + Decreased BS   - Dullness R L  Cardiac:       + S1 + S2    + RRR   - Irregular   - S3  - S4    - Murmurs   - Rub   - Hamman’s sign   Abdomen:    + BS     + Soft    + Non-tender  - Distended  - Organomegaly  - PEG Cholecystostomy tube in place   Extremities:   - Cyanosis U/L   - Clubbing  U/L  - LE/UE Edema   + Capillary refill    + Pulses   Neuro:        + Awake   +  Alert   - Confused   - Lethargic   - Sedated   - Generalized Weakness  Skin:        - Rashes    - Erythema   + Normal incisions   + IV sites intact + sacral and bilateral wound       HOSPITAL MEDICATIONS: All mediciations reviewed and analyzed  MEDICATIONS  (STANDING):  albumin human  5% IVPB 250 milliLiter(s) IV Intermittent once  cefepime   IVPB 2000 milliGRAM(s) IV Intermittent every 12 hours  chlorhexidine 2% Cloths 1 Application(s) Topical <User Schedule>  cholecalciferol 1000 Unit(s) Oral daily  dextrose 40% Gel 15 Gram(s) Oral once  dextrose 50% Injectable 25 Gram(s) IV Push once  gabapentin Solution 100 milliGRAM(s) Oral three times a day  glucagon  Injectable 1 milliGRAM(s) IntraMuscular once  insulin lispro (ADMELOG) corrective regimen sliding scale   SubCutaneous every 6 hours  magnesium sulfate  IVPB 1 Gram(s) IV Intermittent once  methimazole 10 milliGRAM(s) Oral daily  metoclopramide 10 milliGRAM(s) Oral four times a day  mexiletine 150 milliGRAM(s) Oral every 8 hours  mirtazapine 7.5 milliGRAM(s) Oral at bedtime  multivitamin 1 Tablet(s) Oral daily  pantoprazole  Injectable 40 milliGRAM(s) IV Push daily  potassium phosphate IVPB 15 milliMole(s) IV Intermittent once  senna 2 Tablet(s) Oral at bedtime  sertraline 100 milliGRAM(s) Oral daily  vancomycin  IVPB 750 milliGRAM(s) IV Intermittent every 24 hours  vasopressin Infusion 0.02 Unit(s)/Min (1.2 mL/Hr) IV Continuous <Continuous>    MEDICATIONS  (PRN):    LABS: All Lab data reviewed and analyzed                        9.7    10.60 )-----------( 140      ( 25 Mar 2022 09:23 )             29.4       136  |  103  |  36<H>  ----------------------------<  139<H>  4.5   |  26  |  0.69    Ca    9.1      25 Mar 2022 09:23  Mg     2.0     03-      Ca    9.3      23 Mar 2022 04:22  Phos  2.3     -  Mg     1.5         TPro  9.1<H>  /  Alb  3.0<L>  /  TBili  0.4  /  DBili  x   /  AST  35  /  ALT  42  /  AlkPhos  220<H>                 25.2    Ca    9.4      21 Mar 2022 06:29  Phos  2.2     -  Mg     1.7         TPro  8.9<H>  /  Alb  3.1<L>  /  TBili  0.7  /  DBili  x   /  AST  49<H>  /  ALT  58<H>  /  AlkPhos  206<H>        TPro  9.4<H>  /  Alb  3.2<L>  /  TBili  0.4  /  DBili  x   /  AST  35  /  ALT  24  /  AlkPhos  207<H>  03-16             PTT - ( 01 Mar 2022 00:08 )  PTT:25.0 sec LIVER FUNCTIONS - ( 01 Mar 2022 17:38 )  Alb: 2.9 g/dL / Pro: 7.3 g/dL / ALK PHOS: 116 U/L / ALT: 30 U/L / AST: 29 U/L / GGT: x           RADIOLOGY: - Reviewed and analyzed

## 2022-03-27 NOTE — PROGRESS NOTE ADULT - PROBLEM SELECTOR PLAN 5
Intermittent H/H drops requiring transfusions  Transfused 2 units PRBCs 3/24 for Hgb 5.5, post transfusion Hgb 9.7  High risk for EGD/colonoscopy from GI and cardiology evaluations  Daily CBC  Had melena today, + guiac. Already on PPI IV BID, maintain active T&S and transfuse to hbg > 8

## 2022-03-27 NOTE — CHART NOTE - NSCHARTNOTEFT_GEN_A_CORE
Pt with dark stool today, guiac positive. Discussed with Dr. Dalal. Transfuse for HGB <8, daily CBC , continue BID protonix. NO need for gi consult at this time. Will follow

## 2022-03-27 NOTE — PROGRESS NOTE ADULT - ASSESSMENT
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    S/p prolonged complex hospitalization from 6/14-11/16/2021 initially admitted with abdominal pain complicated by GIB, respiratory failure s/p trach, multiple infections, s/p cholecystotomy tube and SMA stent 10/2021, ultimately discharged to [facility] rehab and then returned to [facility] for trach decannulation 12/2. The patient had a recent admission with COVID 19 reinfection and distributive shock. That admission he had blood culture positive for serratia marcescens (discharged on levaquin).  He was treated with MAB, remdesivir, and steroids. He had recurrent VT and was started mexiletine.     Now coming in from [facility] ED with leukocytosis and AMS was treated for UTI. Initially required bipap but was weaned off here. Labs concerning here for WBC 26, hemoglobin of 7s then 6.7, creatinine of 1.3. His maps were initially in the 50s. Physical exam showing pus from stoma, sat of central access of 81.8, and tachycardia are all concerning for septic shock picture. He is s/p 1L fluids, 1uPRBCs, was placed on vaso. Patient has known episodes of VT and had recurrent NSVT/VT when he first arrived started on lidocaine infusion with improvement. Of note he was previously on mexiletine 150 TID outpatient, metoprolol ER 25 qAM and 50 qPM. Previous history of thyrotoxicosis on amiodarone.    He was found to be bacteremic with recurrent serratia, on IV abx possibly r/t LVAD infection vs splenic abscess noted on CT 1/19. Afebrile since 3/1. Vasopressin weaned off 3/4, started on midodrine which has now been off since 3/8. LVAD without s/s of pump malfunction. Currently he opens his eyes however remains nonverbal and not following commands. His mental status is still not back to baseline. His VT has now subsided, last episode was noted on 3/12.  His hyponatremia and hyperkalemia are overall improving after receiving the following treatment insulin/D50 and lokelma.  Recently his hemoglobin has continued to drop, will be transfused with 1 uPRBC on 3/16. Overall fairly stable from HF perspective. 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 After family discussion on 3/17, family wishes to continues course of treatment and are wanting to proceed with PEG placement however after further discussion with GI and IR patient is not a candidate.

## 2022-03-27 NOTE — PROGRESS NOTE ADULT - PROBLEM SELECTOR PLAN 1
- 2/28 BCx + serratia/GNR  - WBC overall improving now with broaden antibiotics.   - CT C/A/P 3/18 concerning for mucous plugging with possible aspiration PNA  - palliative care following, family meeting held on 3/17. At this time the family wishes to continue with course of treatment. ID has cleared patient to undergo PEG placement however as per GI and IR team patient is high risk to undergo procedure.  - Family meeting 3/26 - plan remains unchanged. His family is waiting for God to decide until when he will be with us.

## 2022-03-27 NOTE — PROGRESS NOTE ADULT - PROBLEM SELECTOR PLAN 1
Suspect secondary to infection with diffuse cerebral dysfunction.   Per family meeting 3/17 continue all medical treatment   Repeat family meeting 3/25 with no change in goals  They are reportedly aware of poor prognosis and functional quadriplegia  DNR/DNI

## 2022-03-27 NOTE — PROGRESS NOTE ADULT - ASSESSMENT
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 s/p HM2 LVAD in 9/2017 at  (due to severe PAD) with TV ring, multiple GI bleeds, thus maintained off AC, prior COVID-19 infection in 4/2020, recurrent syncope post LVAD s/p ILR, prolonged complex hospitalization from 6/14-11/16/2021 with GIB, respiratory failure s/p trach decanulated 12/2021, multiple infections, s/p cholecystotomy tube and SMA stent 10/2021, recurrent COVID 19 reinfection s/p MAB/remdesivir/steroids and distributive shock with serratia bacteremia, recurrent VT on mexiletine transferred from [facility] ED with metabolic encephalopathy, hypercarbic respiratory failure weaned off bipap, septic shock requiring pressors (now on midodrine) with serratia bacteremia on cefepime, intermittent h/h drop s/p total 3units prbc (last 3/6), episodes of recurrent VT s/p lidocaine gtt now on mexiletine. Receiving NGT feedings, per GI/IR too high risk for PEG placement

## 2022-03-27 NOTE — PROGRESS NOTE ADULT - SUBJECTIVE AND OBJECTIVE BOX
Subjective:  No overnight events  As per reports, pt has been having dark stools.     Medications:  ascorbic acid 500 milliGRAM(s) Oral daily  cefepime   IVPB 1000 milliGRAM(s) IV Intermittent every 12 hours  chlorhexidine 2% Cloths 1 Application(s) Topical <User Schedule>  cholecalciferol 2000 Unit(s) Oral daily  HYDROmorphone  Injectable 0.2 milliGRAM(s) IV Push every 2 hours PRN  insulin lispro (ADMELOG) corrective regimen sliding scale   SubCutaneous every 6 hours  metoprolol tartrate 50 milliGRAM(s) Oral two times a day  mexiletine 150 milliGRAM(s) Oral every 8 hours  multivitamin 1 Tablet(s) Oral daily  pantoprazole  Injectable 40 milliGRAM(s) IV Push two times a day  polyethylene glycol 3350 17 Gram(s) Oral daily PRN  senna 2 Tablet(s) Oral at bedtime  sertraline 100 milliGRAM(s) Oral daily  sodium chloride 0.9% Bolus 250 milliLiter(s) IV Bolus once  thiamine 100 milliGRAM(s) Oral daily  traMADol 25 milliGRAM(s) Oral once    Vitals:  T(C): 37.1 (22 @ 17:45), Max: 37.1 (22 @ 17:45)  HR: 120 (22 @ 17:45) (98 - 120)  BP: --  BP(mean): 74 (22 @ 17:45) (74 - 80)  RR: 20 (22 @ 17:45) (20 - 22)  SpO2: 99% (22 @ 17:45) (97% - 100%)    Daily     Daily Weight in k.2 (27 Mar 2022 09:38)        I&O's Summary    26 Mar 2022 07:  -  27 Mar 2022 07:00  --------------------------------------------------------  IN: 840 mL / OUT: 1753 mL / NET: -913 mL    27 Mar 2022 07:  -  27 Mar 2022 18:06  --------------------------------------------------------  IN: 0 mL / OUT: 550 mL / NET: -550 mL        Physical Exam:  Appearance: cachectic, frail  HEENT: JVP non elevated  Cardiovascular: VAD hum  Respiratory: Clear to auscultation bilaterally  Gastrointestinal: Soft, Non-tender, non-distended	. DL dressing CDI.  Skin: no skin lesions  Neurologic: Non-focal  Extremities: No LE edema, Muscle atropy  Psychiatry: Alert, nonverbal    VAD Interrogation  Type: HM 3  No VAD alarms  Speed 9200  rpm  Flow 5.1 lpm  Power 5.6 gallegos  PI 4.3  Assessment of driveline exit: driveline dressing clean/dry/intact    Labs:                        8.6    10.18 )-----------( 163      ( 27 Mar 2022 10:01 )             27.5     03-27    132<L>  |  98  |  44<H>  ----------------------------<  167<H>  5.2   |  25  |  0.80    Ca    9.5      27 Mar 2022 10:01  Mg     1.9     03-26

## 2022-03-27 NOTE — PROGRESS NOTE ADULT - PROBLEM SELECTOR PLAN 9
- currently being fed via keotube  - at this time patient is unable to tolerate PO diet. Family wishes to proceed with PEG placement.   - GI following, will need to determine date of PEG placement (of note will need to be booked with cardiac anesthesia)  - Aas stated above GI and IR teams consider patient is too high risk.  - Family meeting as stated above

## 2022-03-27 NOTE — PROGRESS NOTE ADULT - ASSESSMENT
Assessment and Recommendation:   · Assessment	  Assessment and recommendation :  Acute hypercapnic respiratory Failure on 3 liter on nasal cannula    S/P Septic shock   Bacteremia with serratia marcescens  Severe anemia with low H/H   S/P transfuse of Packed RBCs   Severe ischemic cardiomyopathy   ACC/AHA stage D systolic heart failure  severe ischemic cardiomyopathy EF 10%  wound care for sacral and bilateral buttock wound   Peripheral vascular Disease   PAF on no ACO   One RT LL opacity improved will clear for PEG placement   H/O of repeated GI bleeding   S/P mesenteric ischemia   GERD on Reglan   S/P HM2 LVAD , VT on Mexiletine   hyperthyroidism on  methimazole   severe protein caloric malnutrition   severe neuropathy   Major depression   Continue Antibiotic cefepime   wound care follow up   NG tube feeding   GI prophylaxis   patient is DNR/DNI

## 2022-03-27 NOTE — PROGRESS NOTE ADULT - SUBJECTIVE AND OBJECTIVE BOX
[facility] Division of Hospital Medicine  Venessa Dalal DO pager [phone]    Patient is a 66y old  Male who presents with a chief complaint of Septic shock (27 Mar 2022 11:46)      SUBJECTIVE / OVERNIGHT EVENTS:  + melena  afebrile    MEDICATIONS  (STANDING):  ascorbic acid 500 milliGRAM(s) Oral daily  cefepime   IVPB 1000 milliGRAM(s) IV Intermittent every 12 hours  chlorhexidine 2% Cloths 1 Application(s) Topical <User Schedule>  cholecalciferol 2000 Unit(s) Oral daily  insulin lispro (ADMELOG) corrective regimen sliding scale   SubCutaneous every 6 hours  metoprolol tartrate 50 milliGRAM(s) Oral two times a day  mexiletine 150 milliGRAM(s) Oral every 8 hours  multivitamin 1 Tablet(s) Oral daily  pantoprazole  Injectable 40 milliGRAM(s) IV Push two times a day  senna 2 Tablet(s) Oral at bedtime  sertraline 100 milliGRAM(s) Oral daily  sodium chloride 0.9% Bolus 250 milliLiter(s) IV Bolus once  thiamine 100 milliGRAM(s) Oral daily  traMADol 25 milliGRAM(s) Oral once    MEDICATIONS  (PRN):  HYDROmorphone  Injectable 0.2 milliGRAM(s) IV Push every 2 hours PRN Moderate to Severe pain  polyethylene glycol 3350 17 Gram(s) Oral daily PRN Constipation      CAPILLARY BLOOD GLUCOSE      POCT Blood Glucose.: 130 mg/dL (27 Mar 2022 12:09)  POCT Blood Glucose.: 148 mg/dL (27 Mar 2022 05:50)  POCT Blood Glucose.: 139 mg/dL (27 Mar 2022 00:11)  POCT Blood Glucose.: 147 mg/dL (26 Mar 2022 17:28)    I&O's Summary    26 Mar 2022 07:01  -  27 Mar 2022 07:00  --------------------------------------------------------  IN: 840 mL / OUT: 1753 mL / NET: -913 mL    27 Mar 2022 07:01  -  27 Mar 2022 16:41  --------------------------------------------------------  IN: 0 mL / OUT: 550 mL / NET: -550 mL        PHYSICAL EXAM:  Vital Signs Last 24 Hrs  T(C): 36.4 (27 Mar 2022 13:12), Max: 36.6 (26 Mar 2022 17:00)  T(F): 97.5 (27 Mar 2022 13:12), Max: 97.9 (26 Mar 2022 17:00)  HR: 104 (27 Mar 2022 13:12) (98 - 117)  BP: --  BP(mean): 76 (27 Mar 2022 13:12) (74 - 80)  RR: 20 (27 Mar 2022 13:12) (20 - 22)  SpO2: 100% (27 Mar 2022 13:12) (97% - 100%)    CONSTITUTIONAL: NAD, cachetic. Has NGT  RESPIRATORY: Normal respiratory effort; lungs are clear to auscultation bilaterally  CARDIOVASCULAR: Regular rate and rhythm, normal S1 and S2, no murmur/rub/gallop; No lower extremity edema; Peripheral pulses are 2+ bilaterally  ABDOMEN: Nontender to palpation, normoactive bowel sounds, no rebound/guarding  MUSCULOSKELETAL: clubbing or cyanosis of digits; no joint swelling or tenderness to palpation  PSYCH: AOx0    LABS:                        8.6    10.18 )-----------( 163      ( 27 Mar 2022 10:01 )             27.5     03-27    132<L>  |  98  |  44<H>  ----------------------------<  167<H>  5.2   |  25  |  0.80    Ca    9.5      27 Mar 2022 10:01  Mg     1.9     03-26                  RADIOLOGY & ADDITIONAL TESTS:  Results Reviewed:   Imaging Personally Reviewed:  Electrocardiogram Personally Reviewed:    COORDINATION OF CARE:  Care Discussed with Consultants/Other Providers [Y/N]:  Prior or Outpatient Records Reviewed [Y/N]:

## 2022-03-27 NOTE — PROGRESS NOTE ADULT - SUBJECTIVE AND OBJECTIVE BOX
RICKY JOINT  MRN#: [id number]  Subjective: pulmonary progress note  : acute hypercapnic respiratory failure . Septic shock , service rendered on 2002   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 s/p HM2 LVAD in 2017 at  (due to severe PAD) with TV ring, multiple GI bleeds, thus maintained off AC, CKD 3prior COVID-19 infection in 2020, recurrent syncope post LVAD s/p ILR, s/p prolonged complex hospitalization from -2021 initially admitted with abdominal pain complicated by GIB, respiratory failure s/p trach, multiple infections, s/p cholecystotomy tube and SMA stent 10/2021, ultimately discharged to [facility] rehab and then returned to [facility] for trach decannulation , readmitted in 2022 with recurrent COVID-19 infection, initially in distributive shock. Blood cultures were also positive for serratia marcescens which he has had in the past. seen by heart failure team patient is DNR/DNI  continue on 3 liter nasal canula flat Affect , hyperkalemia lokelma , runs of V-Tach. more stable  very weak , non verbal non communicative on tube feeding , leg booting, CT scan of chest mucous bulging  on Antibiotic and incentive spirometry  , patient receiving one unit of blood transfusion , sleeping at round .RT opacity infiltrate on Antibiotic  , condition is the same ,wound care note appreciated , NG tube was replaced continue on tube feeding .wound care follow up appreciated , chest x ray no evidence of atelectasis , patient can have PEG placement          (2022 22:20)    PAST MEDICAL & SURGICAL HISTORY:  CHF (congestive heart failure)    CAD (coronary artery disease)  Depression    Pleural effusion    History of 2019 novel coronavirus disease (COVID-19)  2020    Hemorrhoids    Bleeding hemorrhoids    Peripheral arterial disease    Claudication    BPH with urinary obstruction    ACC/AHA stage D systolic heart failure    Anticoagulation goal of INR 2.0 to 2.5    Falls    Clavicle fracture    CKD (chronic kidney disease), stage III    Iron deficiency anemia    H/O epistaxis    Vertigo    GI bleed    S/P TVR (tricuspid valve repair)    S/P ventricular assist device    S/P endoscopy    H/O tracheostomy        FAMILY HISTORY:    Social History:    Marital Status:  X    (   ) Single    (   )    (  )   Occupation: Retired   Lives with: (  ) alone  (  ) children  X spouse   (  ) parents  (  ) other    Substance Use (street drugs): X never used  (  ) other:  Tobacco Usage: X never smoked   (   ) former smoker   (   ) current smoker  (     ) pack year  (    ) last cigarette date  Alcohol Usage: Social         OBJECTIVE:  ICU Vital Signs Last 24 Hrs  T(C): 37.2 (01 Mar 2022 16:00), Max: 38.4 (01 Mar 2022 09:00)  T(F): 99 (01 Mar 2022 16:00), Max: 101.1 (01 Mar 2022 09:00)  HR: 109 (01 Mar 2022 18:00) (67 - 124)  BP: --  BP(mean): 86 (2022 20:00) (86 - 86)  ABP: 89/74 (01 Mar 2022 18:00) (75/63 - 160/85)  ABP(mean): 81 (01 Mar 2022 18:00) (68 - 139)  RR: 38 (01 Mar 2022 18:00) (5 - 40)  SpO2: 99% (01 Mar 2022 18:00) (94% - 100%)       @ 07: @ 07:00  --------------------------------------------------------  IN: 1060.7 mL / OUT: 1600 mL / NET: -539.3 mL     @ 07: @ 18:22  --------------------------------------------------------  IN: 677.8 mL / OUT: 455 mL / NET: 222.8 mL    PHYSICAL EXAM :Daily Height in cm: 177.8 (2022 21:20)  Elderly frail male     Daily Weight in k.7 (2022 21:20)  HEENT:     + NCAT  + EOMI  - Conjuctival edema   - Icterus   - Thrush   - ETT  - NGT/OGT  Neck:         + FROM RT IJ  JVD     - Nodes     - Masses    + Mid-line trachea   - Tracheostomy  Chest:           mild kyphosis   Lungs:          + CTA  + Rhonchi  + Rales    - Wheezing  + Decreased BS   - Dullness R L  Cardiac:       + S1 + S2    + RRR   - Irregular   - S3  - S4    - Murmurs   - Rub   - Hamman’s sign   Abdomen:    + BS     + Soft    + Non-tender  - Distended  - Organomegaly  - PEG Cholecystostomy tube in place   Extremities:   - Cyanosis U/L   - Clubbing  U/L  - LE/UE Edema   + Capillary refill    + Pulses   Neuro:        + Awake   +  Alert   - Confused   - Lethargic   - Sedated   - Generalized Weakness  Skin:        - Rashes    - Erythema   + Normal incisions   + IV sites intact + sacral and bilateral wound       HOSPITAL MEDICATIONS: All mediciations reviewed and analyzed  MEDICATIONS  (STANDING):  albumin human  5% IVPB 250 milliLiter(s) IV Intermittent once  cefepime   IVPB 2000 milliGRAM(s) IV Intermittent every 12 hours  chlorhexidine 2% Cloths 1 Application(s) Topical <User Schedule>  cholecalciferol 1000 Unit(s) Oral daily  dextrose 40% Gel 15 Gram(s) Oral once  dextrose 50% Injectable 25 Gram(s) IV Push once  gabapentin Solution 100 milliGRAM(s) Oral three times a day  glucagon  Injectable 1 milliGRAM(s) IntraMuscular once  insulin lispro (ADMELOG) corrective regimen sliding scale   SubCutaneous every 6 hours  magnesium sulfate  IVPB 1 Gram(s) IV Intermittent once  methimazole 10 milliGRAM(s) Oral daily  metoclopramide 10 milliGRAM(s) Oral four times a day  mexiletine 150 milliGRAM(s) Oral every 8 hours  mirtazapine 7.5 milliGRAM(s) Oral at bedtime  multivitamin 1 Tablet(s) Oral daily  pantoprazole  Injectable 40 milliGRAM(s) IV Push daily  potassium phosphate IVPB 15 milliMole(s) IV Intermittent once  senna 2 Tablet(s) Oral at bedtime  sertraline 100 milliGRAM(s) Oral daily  vancomycin  IVPB 750 milliGRAM(s) IV Intermittent every 24 hours  vasopressin Infusion 0.02 Unit(s)/Min (1.2 mL/Hr) IV Continuous <Continuous>    MEDICATIONS  (PRN):    LABS: All Lab data reviewed and analyzed                                   8.6    10.18 )-----------( 163      ( 27 Mar 2022 10:01 )             27.5     03-27    132<L>  |  98  |  44<H>  ----------------------------<  167<H>  5.2   |  25  |  0.80    Ca    9.5      27 Mar 2022 10:01  Mg     1.9     03-26      Ca    9.1      25 Mar 2022 09:23  Mg     2.0     03-24      Ca    9.3      23 Mar 2022 04:22  Phos  2.3     03-23  Mg     1.5     03-23    TPro  9.1<H>  /  Alb  3.0<L>  /  TBili  0.4  /  DBili  x   /  AST  35  /  ALT  42  /  AlkPhos  220<H>  03-23             PTT - ( 01 Mar 2022 00:08 )  PTT:25.0 sec LIVER FUNCTIONS - ( 01 Mar 2022 17:38 )  Alb: 2.9 g/dL / Pro: 7.3 g/dL / ALK PHOS: 116 U/L / ALT: 30 U/L / AST: 29 U/L / GGT: x           RADIOLOGY: - Reviewed and analyzed

## 2022-03-28 NOTE — PROGRESS NOTE ADULT - PROBLEM SELECTOR PLAN 4
cw cefepime- serratia bacteremia - source likely LVAD  Completed 7 days meropenem  With leukocytosis but afebrile, suspect possible 2/2 GIB

## 2022-03-28 NOTE — PROGRESS NOTE ADULT - PROBLEM SELECTOR PLAN 9
- currently being fed via keotube  - at this time patient is unable to tolerate PO diet. Family wishes to proceed with PEG placement.   - GI following, will need to determine date of PEG placement (of note will need to be booked with cardiac anesthesia)  - As stated above GI and IR teams consider patient is too high risk.  - Family meeting as stated above

## 2022-03-28 NOTE — PROGRESS NOTE ADULT - ASSESSMENT
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 s/p HM2 LVAD in 9/2017 at  (due to severe PAD) with TV ring, multiple GI bleeds, thus maintained off AC, prior COVID-19 infection in 4/2020, recurrent syncope post LVAD s/p ILR, prolonged complex hospitalization from 6/14-11/16/2021 with GIB, respiratory failure s/p trach decanulated 12/2021, multiple infections, s/p cholecystotomy tube and SMA stent 10/2021, recurrent COVID 19 reinfection s/p MAB/remdesivir/steroids and distributive shock with serratia bacteremia, recurrent VT on mexiletine transferred from [facility] ED with metabolic encephalopathy, hypercarbic respiratory failure weaned off bipap, septic shock requiring pressors (now on midodrine) with serratia bacteremia on cefepime, intermittent h/h drop s/p total 3units prbc (last 3/6), episodes of recurrent VT s/p lidocaine gtt now on mexiletine. Receiving NGT feedings, per GI/IR too high risk for PEG placement

## 2022-03-28 NOTE — PROGRESS NOTE ADULT - SUBJECTIVE AND OBJECTIVE BOX
Subjective: Patient seen and examined resting in bed. Is awake but remains non verbal and does not follow commands.    Medications:  ascorbic acid 500 milliGRAM(s) Oral daily  cefepime   IVPB 1000 milliGRAM(s) IV Intermittent every 12 hours  chlorhexidine 2% Cloths 1 Application(s) Topical <User Schedule>  cholecalciferol 2000 Unit(s) Oral daily  HYDROmorphone  Injectable 0.2 milliGRAM(s) IV Push every 2 hours PRN  insulin lispro (ADMELOG) corrective regimen sliding scale   SubCutaneous every 6 hours  metoprolol tartrate 50 milliGRAM(s) Oral two times a day  mexiletine 150 milliGRAM(s) Oral every 8 hours  multivitamin 1 Tablet(s) Oral daily  pantoprazole  Injectable 40 milliGRAM(s) IV Push two times a day  polyethylene glycol 3350 17 Gram(s) Oral daily PRN  senna 2 Tablet(s) Oral at bedtime  sertraline 100 milliGRAM(s) Oral daily  sodium chloride 0.9% Bolus 250 milliLiter(s) IV Bolus once  thiamine 100 milliGRAM(s) Oral daily  traMADol 25 milliGRAM(s) Oral once      Vitals:  Vital Signs Last 24 Hours  T(C): 37.1 (22 @ 10:06), Max: 37.5 (22 @ 03:25)  HR: 105 (22 @ 10:06) (104 - 132)  BP: --  RR: 20 (22 @ 10:06) (20 - 26)  SpO2: 98% (22 @ 10:06) (95% - 100%)    Weight in k.8 ( @ 09:28)    I&O's Summary    27 Mar 2022 07:01  -  28 Mar 2022 07:00  --------------------------------------------------------  IN: 940 mL / OUT: 1245 mL / NET: -305 mL    28 Mar 2022 07:  -  28 Mar 2022 12:32  --------------------------------------------------------  IN: 275 mL / OUT: 0 mL / NET: 275 mL      Physical Exam  General: No distress. Comfortable.  HEENT: EOM intact.  Neck: Neck supple. JVP not elevated. No masses  Chest: Clear to auscultation bilaterally  CV: Normal S1 and S2. No murmurs, rub, or gallops. Radial pulses normal.  Abdomen: Soft, non-distended, non-tender  Skin: No rashes or skin breakdown  Neurology: awake but remains non verbal and not following commands      LVAD Interrogation  VAD TYPE HM 2  Speed 9200  Flow 5.8  Power 5.8  PI 4.4  Assessment of driveline exit site: driveline dressing c/d/i  Programming changes: no changes made    Labs:                        8.0    16.30 )-----------( 175      ( 28 Mar 2022 03:54 )             25.3         131<L>  |  96  |  53<H>  ----------------------------<  112<H>  5.0   |  26  |  1.05    Ca    9.6      28 Mar 2022 06:43  Mg     2.0

## 2022-03-28 NOTE — PROGRESS NOTE ADULT - SUBJECTIVE AND OBJECTIVE BOX
RICKY JOINT  MRN#: [id number]  Subjective: pulmonary progress note  : acute hypercapnic respiratory failure . Septic shock , service rendered on 2002   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 s/p HM2 LVAD in 2017 at  (due to severe PAD) with TV ring, multiple GI bleeds, thus maintained off AC, CKD 3prior COVID-19 infection in 2020, recurrent syncope post LVAD s/p ILR, s/p prolonged complex hospitalization from -2021 initially admitted with abdominal pain complicated by GIB, respiratory failure s/p trach, multiple infections, s/p cholecystotomy tube and SMA stent 10/2021, ultimately discharged to [facility] rehab and then returned to [facility] for trach decannulation , readmitted in 2022 with recurrent COVID-19 infection, initially in distributive shock. Blood cultures were also positive for serratia marcescens which he has had in the past. seen by heart failure team patient is DNR/DNI  continue on 3 liter nasal canula flat Affect , hyperkalemia lokelma , runs of V-Tach. more stable  very weak , non verbal non communicative on tube feeding , leg booting, CT scan of chest mucous bulging  on Antibiotic and incentive spirometry  , patient receiving one unit of blood transfusion , sleeping at round .RT opacity infiltrate on Antibiotic  , condition is the same ,wound care note appreciated , NG tube was replaced continue on tube feeding .wound care follow up appreciated , chest x ray no evidence of atelectasis , patient can have PEG placement , sleeping on round no fever no over night events          (2022 22:20)    PAST MEDICAL & SURGICAL HISTORY:  CHF (congestive heart failure)    CAD (coronary artery disease)  Depression    Pleural effusion    History of 2019 novel coronavirus disease (COVID-19)  2020    Hemorrhoids    Bleeding hemorrhoids    Peripheral arterial disease    Claudication    BPH with urinary obstruction    ACC/AHA stage D systolic heart failure    Anticoagulation goal of INR 2.0 to 2.5    Falls    Clavicle fracture    CKD (chronic kidney disease), stage III    Iron deficiency anemia    H/O epistaxis    Vertigo    GI bleed    S/P TVR (tricuspid valve repair)    S/P ventricular assist device    S/P endoscopy    H/O tracheostomy        FAMILY HISTORY:    Social History:    Marital Status:  X    (   ) Single    (   )    (  )   Occupation: Retired   Lives with: (  ) alone  (  ) children  X spouse   (  ) parents  (  ) other    Substance Use (street drugs): X never used  (  ) other:  Tobacco Usage: X never smoked   (   ) former smoker   (   ) current smoker  (     ) pack year  (    ) last cigarette date  Alcohol Usage: Social         OBJECTIVE:  ICU Vital Signs Last 24 Hrs  T(C): 37.2 (01 Mar 2022 16:00), Max: 38.4 (01 Mar 2022 09:00)  T(F): 99 (01 Mar 2022 16:00), Max: 101.1 (01 Mar 2022 09:00)  HR: 109 (01 Mar 2022 18:00) (67 - 124)  BP: --  BP(mean): 86 (2022 20:00) (86 - 86)  ABP: 89/74 (01 Mar 2022 18:00) (75/63 - 160/85)  ABP(mean): 81 (01 Mar 2022 18:00) (68 - 139)  RR: 38 (01 Mar 2022 18:00) (5 - 40)  SpO2: 99% (01 Mar 2022 18:00) (94% - 100%)       @ 07: @ 07:00  --------------------------------------------------------  IN: 1060.7 mL / OUT: 1600 mL / NET: -539.3 mL     @ 07: @ 18:22  --------------------------------------------------------  IN: 677.8 mL / OUT: 455 mL / NET: 222.8 mL    PHYSICAL EXAM :Daily Height in cm: 177.8 (2022 21:20)  Elderly frail male     Daily Weight in k.7 (2022 21:20)  HEENT:     + NCAT  + EOMI  - Conjuctival edema   - Icterus   - Thrush   - ETT  - NGT/OGT  Neck:         + FROM RT IJ  JVD     - Nodes     - Masses    + Mid-line trachea   - Tracheostomy  Chest:           mild kyphosis   Lungs:          + CTA  + Rhonchi  + Rales    - Wheezing  + Decreased BS   - Dullness R L  Cardiac:       + S1 + S2    + RRR   - Irregular   - S3  - S4    - Murmurs   - Rub   - Hamman’s sign   Abdomen:    + BS     + Soft    + Non-tender  - Distended  - Organomegaly  - PEG Cholecystostomy tube in place   Extremities:   - Cyanosis U/L   - Clubbing  U/L  - LE/UE Edema   + Capillary refill    + Pulses   Neuro:        + Awake   +  Alert   - Confused   - Lethargic   - Sedated   - Generalized Weakness  Skin:        - Rashes    - Erythema   + Normal incisions   + IV sites intact + sacral and bilateral wound       HOSPITAL MEDICATIONS: All mediciations reviewed and analyzed  MEDICATIONS  (STANDING):  albumin human  5% IVPB 250 milliLiter(s) IV Intermittent once  cefepime   IVPB 2000 milliGRAM(s) IV Intermittent every 12 hours  chlorhexidine 2% Cloths 1 Application(s) Topical <User Schedule>  cholecalciferol 1000 Unit(s) Oral daily  dextrose 40% Gel 15 Gram(s) Oral once  dextrose 50% Injectable 25 Gram(s) IV Push once  gabapentin Solution 100 milliGRAM(s) Oral three times a day  glucagon  Injectable 1 milliGRAM(s) IntraMuscular once  insulin lispro (ADMELOG) corrective regimen sliding scale   SubCutaneous every 6 hours  magnesium sulfate  IVPB 1 Gram(s) IV Intermittent once  methimazole 10 milliGRAM(s) Oral daily  metoclopramide 10 milliGRAM(s) Oral four times a day  mexiletine 150 milliGRAM(s) Oral every 8 hours  mirtazapine 7.5 milliGRAM(s) Oral at bedtime  multivitamin 1 Tablet(s) Oral daily  pantoprazole  Injectable 40 milliGRAM(s) IV Push daily  potassium phosphate IVPB 15 milliMole(s) IV Intermittent once  senna 2 Tablet(s) Oral at bedtime  sertraline 100 milliGRAM(s) Oral daily  vancomycin  IVPB 750 milliGRAM(s) IV Intermittent every 24 hours  vasopressin Infusion 0.02 Unit(s)/Min (1.2 mL/Hr) IV Continuous <Continuous>    MEDICATIONS  (PRN):    LABS: All Lab data reviewed and analyzed                        8.0    16.30 )-----------( 175      ( 28 Mar 2022 03:54 )             25.3                03-28    131<L>  |  96  |  53<H>  ----------------------------<  112<H>  5.0   |  26  |  1.05    Ca    9.6      28 Mar 2022 06:43  Mg     2.0     -28      Ca    9.5      27 Mar 2022 10:01  Mg     1.9     03-      Ca    9.1      25 Mar 2022 09:23  Mg     2.0     03-24      Ca    9.3      23 Mar 2022 04:22  Phos  2.3     03-23  Mg     1.5     -23    TPro  9.1<H>  /  Alb  3.0<L>  /  TBili  0.4  /  DBili  x   /  AST  35  /  ALT  42  /  AlkPhos  220<H>  03-23             PTT - ( 01 Mar 2022 00:08 )  PTT:25.0 sec LIVER FUNCTIONS - ( 01 Mar 2022 17:38 )  Alb: 2.9 g/dL / Pro: 7.3 g/dL / ALK PHOS: 116 U/L / ALT: 30 U/L / AST: 29 U/L / GGT: x           RADIOLOGY: - Reviewed and analyzed

## 2022-03-28 NOTE — PROGRESS NOTE ADULT - PROBLEM SELECTOR PLAN 9
Prognosis poor- patient is DNR/DNI  Family wanted continued treatment from meeting on 3/17 and 3/25    Updated sister today over the phone.

## 2022-03-28 NOTE — PROGRESS NOTE ADULT - PROBLEM SELECTOR PLAN 1
- 2/28 BCx + serratia/GNR  - WBC continues to rise despite being on IV Cefepime  - CT C/A/P 3/18 concerning for mucous plugging with possible aspiration PNA  - palliative care following, family meeting held on 3/17. At this time the family wishes to continue with course of treatment. ID has cleared patient to undergo PEG placement however as per GI and IR team patient is high risk to undergo procedure.  - Family meeting 3/26 - plan remains unchanged. His family is waiting for God to decide until when he will be with us.

## 2022-03-28 NOTE — PROGRESS NOTE ADULT - ASSESSMENT
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    S/p prolonged complex hospitalization from 6/14-11/16/2021 initially admitted with abdominal pain complicated by GIB, respiratory failure s/p trach, multiple infections, s/p cholecystotomy tube and SMA stent 10/2021, ultimately discharged to [facility] rehab and then returned to [facility] for trach decannulation 12/2. The patient had a recent admission with COVID 19 reinfection and distributive shock. That admission he had blood culture positive for serratia marcescens (discharged on levaquin).  He was treated with MAB, remdesivir, and steroids. He had recurrent VT and was started mexiletine.     Now coming in from [facility] ED with leukocytosis and AMS was treated for UTI. Initially required bipap but was weaned off here. Labs concerning here for WBC 26, hemoglobin of 7s then 6.7, creatinine of 1.3. His maps were initially in the 50s. Physical exam showing pus from stoma, sat of central access of 81.8, and tachycardia are all concerning for septic shock picture. He is s/p 1L fluids, 1uPRBCs, was placed on vaso. Patient has known episodes of VT and had recurrent NSVT/VT when he first arrived started on lidocaine infusion with improvement. Of note he was previously on mexiletine 150 TID outpatient, metoprolol ER 25 qAM and 50 qPM. Previous history of thyrotoxicosis on amiodarone.    He was found to be bacteremic with recurrent serratia, on IV abx possibly r/t LVAD infection vs splenic abscess noted on CT 1/19. Afebrile since 3/1. Vasopressin weaned off 3/4, started on midodrine which has now been off since 3/8. LVAD without s/s of pump malfunction. Currently he opens his eyes however remains nonverbal and not following commands. His mental status is still not back to baseline. His VT has now subsided, last episode was noted on 3/12.  His hyponatremia and hyperkalemia are overall improving after receiving the following treatment insulin/D50 and lokelma.  Recently his hemoglobin has continued to drop, will be transfused with 1 uPRBC on 3/16. Overall fairly stable from HF perspective. 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 After family discussion on 3/17, family wishes to continues course of treatment and are wanting to proceed with PEG placement however after further discussion with GI and IR patient is not a candidate.

## 2022-03-28 NOTE — PROGRESS NOTE ADULT - SUBJECTIVE AND OBJECTIVE BOX
[facility] Division of Hospital Medicine  Venessa Dalal DO pager [phone]    Patient is a 66y old  Male who presents with a chief complaint of Septic shock (28 Mar 2022 12:31)      SUBJECTIVE / OVERNIGHT EVENTS:  Afebrile overnight.  Intermittently tachycardic.     MEDICATIONS  (STANDING):  ascorbic acid 500 milliGRAM(s) Oral daily  cefepime   IVPB 1000 milliGRAM(s) IV Intermittent every 12 hours  chlorhexidine 2% Cloths 1 Application(s) Topical <User Schedule>  cholecalciferol 2000 Unit(s) Oral daily  insulin lispro (ADMELOG) corrective regimen sliding scale   SubCutaneous every 6 hours  metoprolol tartrate 50 milliGRAM(s) Oral two times a day  mexiletine 150 milliGRAM(s) Oral every 8 hours  multivitamin 1 Tablet(s) Oral daily  pantoprazole  Injectable 40 milliGRAM(s) IV Push two times a day  senna 2 Tablet(s) Oral at bedtime  sertraline 100 milliGRAM(s) Oral daily  sodium chloride 0.9% Bolus 250 milliLiter(s) IV Bolus once  thiamine 100 milliGRAM(s) Oral daily  traMADol 25 milliGRAM(s) Oral once    MEDICATIONS  (PRN):  HYDROmorphone  Injectable 0.2 milliGRAM(s) IV Push every 2 hours PRN Moderate to Severe pain  polyethylene glycol 3350 17 Gram(s) Oral daily PRN Constipation      CAPILLARY BLOOD GLUCOSE      POCT Blood Glucose.: 193 mg/dL (28 Mar 2022 12:34)  POCT Blood Glucose.: 133 mg/dL (28 Mar 2022 05:42)  POCT Blood Glucose.: 156 mg/dL (27 Mar 2022 23:55)  POCT Blood Glucose.: 166 mg/dL (27 Mar 2022 18:02)    I&O's Summary    27 Mar 2022 07:01  -  28 Mar 2022 07:00  --------------------------------------------------------  IN: 940 mL / OUT: 1245 mL / NET: -305 mL    28 Mar 2022 07:01  -  28 Mar 2022 16:08  --------------------------------------------------------  IN: 320 mL / OUT: 200 mL / NET: 120 mL        PHYSICAL EXAM:  Vital Signs Last 24 Hrs  T(C): 37.2 (28 Mar 2022 13:48), Max: 37.5 (28 Mar 2022 03:25)  T(F): 98.9 (28 Mar 2022 13:48), Max: 99.5 (28 Mar 2022 03:25)  HR: 108 (28 Mar 2022 13:48) (105 - 132)  BP: --  BP(mean): 70 (28 Mar 2022 13:48) (68 - 76)  RR: 19 (28 Mar 2022 13:48) (19 - 26)  SpO2: 99% (28 Mar 2022 13:48) (95% - 99%)    CONSTITUTIONAL: NAD, cachetic. Has NGT  RESPIRATORY: Normal respiratory effort; lungs are clear to auscultation bilaterally  CARDIOVASCULAR: Regular rate and rhythm, normal S1 and S2, no murmur/rub/gallop; No lower extremity edema  ABDOMEN: Nontender to palpation, normoactive bowel sounds, no rebound/guarding  MUSCULOSKELETAL: clubbing or cyanosis of digits; no joint swelling or tenderness to palpation  PSYCH: AOx0.  NEURO:  Opens eyes, no tracking. Not following commands       LABS:                        8.0    16.30 )-----------( 175      ( 28 Mar 2022 03:54 )             25.3     03-28    131<L>  |  96  |  53<H>  ----------------------------<  112<H>  5.0   |  26  |  1.05    Ca    9.6      28 Mar 2022 06:43  Mg     2.0     03-28                  RADIOLOGY & ADDITIONAL TESTS:  Results Reviewed:   Imaging Personally Reviewed:  Electrocardiogram Personally Reviewed:    COORDINATION OF CARE:  Care Discussed with Consultants/Other Providers [Y/N]:  Prior or Outpatient Records Reviewed [Y/N]:

## 2022-03-28 NOTE — PROGRESS NOTE ADULT - PROBLEM SELECTOR PLAN 3
Previously hypernatremic, NGT free water boluses and IVF d/c'd 3/21  131 today. Monitor BMP, send serum osm, urine osm/Na

## 2022-03-28 NOTE — PROGRESS NOTE ADULT - PROBLEM SELECTOR PLAN 1
Suspect secondary to infection with diffuse cerebral dysfunction.   Per family meeting 3/17 continue all medical treatment   Repeat family meeting 3/25 with no change in goals. D/w Dr. Burgos and he confirmed that family still wants ICU transfer if necessary and all offered medical treatments   They are reportedly aware of poor prognosis and functional quadriplegia  DNR/DNI

## 2022-03-28 NOTE — PROGRESS NOTE ADULT - ASSESSMENT
Assessment and Recommendation:   · Assessment	  Assessment and recommendation :  Acute hypercapnic respiratory Failure on 3 liter on nasal cannula    S/P Septic shock   Bacteremia with serratia marcescens  Severe anemia with low H/H   S/P transfuse of Packed RBCs   Severe ischemic cardiomyopathy   ACC/AHA stage D systolic heart failure  severe ischemic cardiomyopathy EF 10%  wound care for sacral and bilateral buttock wound   Peripheral vascular Disease   PAF on no ACO   One RT LL opacity improved will clear for PEG placement   H/O of repeated GI bleeding   S/P mesenteric ischemia   GERD on Reglan   S/P HM2 LVAD , VT on Mexiletine   hyperthyroidism on  methimazole   severe protein caloric malnutrition   severe neuropathy   Major depression   Continue Antibiotic cefepime   wound care follow up   NG tube feeding , possible PEG   GI prophylaxis   patient is DNR/DNI

## 2022-03-28 NOTE — PROGRESS NOTE ADULT - PROBLEM SELECTOR PLAN 2
Likely multifactorial from pain, infection, bleeding  Dilaudid PRN recommended by palliative - 0.2mg PRN

## 2022-03-29 NOTE — PROGRESS NOTE ADULT - ASSESSMENT
Assessment and Recommendation:   · Assessment	  Assessment and recommendation :  Acute hypercapnic respiratory Failure on 3 liter on nasal cannula    S/P Septic shock   Bacteremia with serratia marcescens  Severe anemia with low H/H   S/P transfuse of Packed RBCs   Severe ischemic cardiomyopathy   ACC/AHA stage D systolic heart failure  severe ischemic cardiomyopathy EF 10%  wound care for sacral and bilateral buttock wound   Peripheral vascular Disease   PAF on no ACO   pulmonary cleared for PEG placement   H/O of repeated GI bleeding   S/P mesenteric ischemia   GERD on Reglan   S/P HM2 LVAD , VT on Mexiletine   hyperthyroidism on  methimazole   severe protein caloric malnutrition   severe neuropathy   Major depression   Continue Antibiotic cefepime   wound care follow up   NG tube feeding , possible PEG   GI prophylaxis   patient is DNR/DNI

## 2022-03-29 NOTE — ED PROVIDER NOTE - NS ED MD DISPO ISOLATION TYPES
None Pt presents to Ed for evaluation of right arm pain that began friday s/p fall due to visual disturbance from macular degeneration. + head strike.  Melody AC. Was seen by medical center in [place] and was found to have a break. Took tylenol 1000mg today for pain.

## 2022-03-29 NOTE — PROGRESS NOTE ADULT - PROBLEM SELECTOR PLAN 5
- history of thyrotoxicosis with amiodarone  - continue mexiletine and  beta blocker as above  - maintain K 4-4.5 and Mg 2-2.5  - EP following

## 2022-03-29 NOTE — PROGRESS NOTE ADULT - SUBJECTIVE AND OBJECTIVE BOX
Subjective: Patient seen and examined resting in bed. ON no issues. Remains non verbal     Medications:  ascorbic acid 500 milliGRAM(s) Oral daily  cefepime   IVPB 1000 milliGRAM(s) IV Intermittent every 12 hours  chlorhexidine 2% Cloths 1 Application(s) Topical <User Schedule>  cholecalciferol 2000 Unit(s) Oral daily  HYDROmorphone  Injectable 0.2 milliGRAM(s) IV Push every 2 hours PRN  insulin lispro (ADMELOG) corrective regimen sliding scale   SubCutaneous every 6 hours  metoprolol tartrate 50 milliGRAM(s) Oral three times a day  mexiletine 150 milliGRAM(s) Oral every 8 hours  multivitamin 1 Tablet(s) Oral daily  pantoprazole  Injectable 40 milliGRAM(s) IV Push two times a day  polyethylene glycol 3350 17 Gram(s) Oral daily PRN  senna 2 Tablet(s) Oral at bedtime  sertraline 100 milliGRAM(s) Oral daily  sodium chloride 0.9% Bolus 250 milliLiter(s) IV Bolus once  thiamine 100 milliGRAM(s) Oral daily  traMADol 25 milliGRAM(s) Oral once      Vitals:  Vital Signs Last 24 Hours  T(C): 36 (22 @ 04:56), Max: 37.2 (22 @ 13:48)  HR: 115 (22 @ 04:56) (93 - 115)  BP: --  RR: 20 (22 @ 04:56) (19 - 20)  SpO2: 100% (22 @ 04:56) (98% - 100%)    Weight in k.7 ( @ 06:29)    I&O's Summary    28 Mar 2022 07:01  -  29 Mar 2022 07:00  --------------------------------------------------------  IN: 1635 mL / OUT: 1050 mL / NET: 585 mL        Physical Exam  General: resting in bed, remains frail   Neck: Neck supple. JVP not elevated. No masses  Chest: Clear to auscultation bilaterally  CV: +VAD hum  Abdomen: Soft, non-distended, non-tender, +keotube  Neurology: awake but remains non verbal and not following commands     LVAD Interrogation  VAD TYPE HM 2  Speed 9200  Flow 4.2  Power 5  PI  4.6  Assessment of driveline exit site: driveline dressing c/d/i  Programming changes: no changes made    Labs:                        7.1    10.82 )-----------( 177      ( 29 Mar 2022 05:13 )             22.5     03-    136  |  100  |  58<H>  ----------------------------<  165<H>  4.6   |  26  |  0.91    Ca    9.6      29 Mar 2022 05:13  Mg     2.0     03-28

## 2022-03-29 NOTE — PROGRESS NOTE ADULT - SUBJECTIVE AND OBJECTIVE BOX
[facility] Division of Hospital Medicine  Venessa Dalal DO pager [phone]    Patient is a 66y old  Male who presents with a chief complaint of Septic shock (29 Mar 2022 14:47)      SUBJECTIVE / OVERNIGHT EVENTS:  No acute overnight events.   hbg 7.1 this AM      MEDICATIONS  (STANDING):  ascorbic acid 500 milliGRAM(s) Oral daily  cefepime   IVPB 1000 milliGRAM(s) IV Intermittent every 12 hours  chlorhexidine 2% Cloths 1 Application(s) Topical <User Schedule>  cholecalciferol 2000 Unit(s) Oral daily  insulin lispro (ADMELOG) corrective regimen sliding scale   SubCutaneous every 6 hours  metoprolol tartrate 50 milliGRAM(s) Oral three times a day  mexiletine 150 milliGRAM(s) Oral every 8 hours  multivitamin 1 Tablet(s) Oral daily  pantoprazole  Injectable 40 milliGRAM(s) IV Push two times a day  senna 2 Tablet(s) Oral at bedtime  sertraline 100 milliGRAM(s) Oral daily  sodium chloride 0.9% Bolus 250 milliLiter(s) IV Bolus once  thiamine 100 milliGRAM(s) Oral daily  traMADol 25 milliGRAM(s) Oral once    MEDICATIONS  (PRN):  HYDROmorphone  Injectable 0.2 milliGRAM(s) IV Push every 2 hours PRN Moderate to Severe pain  polyethylene glycol 3350 17 Gram(s) Oral daily PRN Constipation      CAPILLARY BLOOD GLUCOSE      POCT Blood Glucose.: 121 mg/dL (29 Mar 2022 11:33)  POCT Blood Glucose.: 199 mg/dL (29 Mar 2022 09:25)  POCT Blood Glucose.: 192 mg/dL (29 Mar 2022 07:59)  POCT Blood Glucose.: 169 mg/dL (28 Mar 2022 23:56)  POCT Blood Glucose.: 166 mg/dL (28 Mar 2022 17:22)    I&O's Summary    28 Mar 2022 07:01  -  29 Mar 2022 07:00  --------------------------------------------------------  IN: 1635 mL / OUT: 1200 mL / NET: 435 mL    29 Mar 2022 07:01  -  29 Mar 2022 16:47  --------------------------------------------------------  IN: 455 mL / OUT: 15 mL / NET: 440 mL        PHYSICAL EXAM:  Vital Signs Last 24 Hrs  T(C): 36.6 (29 Mar 2022 11:35), Max: 37 (28 Mar 2022 17:22)  T(F): 97.9 (29 Mar 2022 11:35), Max: 98.6 (28 Mar 2022 17:22)  HR: 108 (29 Mar 2022 13:48) (93 - 115)  BP: --  BP(mean): 74 (29 Mar 2022 13:48) (68 - 76)  RR: 20 (29 Mar 2022 11:35) (19 - 20)  SpO2: 99% (29 Mar 2022 11:35) (99% - 100%)    CONSTITUTIONAL: NAD, cachetic. Has NGT  RESPIRATORY: Normal respiratory effort; lungs are clear to auscultation bilaterally  CARDIOVASCULAR: Regular rate and rhythm, normal S1 and S2, no murmur/rub/gallop; No lower extremity edema  ABDOMEN: Nontender to palpation, normoactive bowel sounds, no rebound/guarding. No blood in biliary drain   MUSCULOSKELETAL: clubbing or cyanosis of digits; no joint swelling or tenderness to palpation  PSYCH: AOx0.  NEURO:  Opens eyes, no tracking. Not following commands     LABS:                        7.1    10.82 )-----------( 177      ( 29 Mar 2022 05:13 )             22.5     03-29    136  |  100  |  58<H>  ----------------------------<  165<H>  4.6   |  26  |  0.91    Ca    9.6      29 Mar 2022 05:13  Mg     2.0     03-28                  RADIOLOGY & ADDITIONAL TESTS:  Results Reviewed:   Imaging Personally Reviewed:  Electrocardiogram Personally Reviewed:    COORDINATION OF CARE:  Care Discussed with Consultants/Other Providers [Y/N]:  Prior or Outpatient Records Reviewed [Y/N]:

## 2022-03-29 NOTE — PROGRESS NOTE ADULT - ASSESSMENT
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    S/p prolonged complex hospitalization from 6/14-11/16/2021 initially admitted with abdominal pain complicated by GIB, respiratory failure s/p trach, multiple infections, s/p cholecystotomy tube and SMA stent 10/2021, ultimately discharged to [facility] rehab and then returned to [facility] for trach decannulation 12/2. The patient had a recent admission with COVID 19 reinfection and distributive shock. That admission he had blood culture positive for serratia marcescens (discharged on levaquin).  He was treated with MAB, remdesivir, and steroids. He had recurrent VT and was started mexiletine.     Now coming in from [facility] ED with leukocytosis and AMS was treated for UTI. Initially required bipap but was weaned off here. Labs concerning here for WBC 26, hemoglobin of 7s then 6.7, creatinine of 1.3. His maps were initially in the 50s. Physical exam showing pus from stoma, sat of central access of 81.8, and tachycardia are all concerning for septic shock picture. He is s/p 1L fluids, 1uPRBCs, was placed on vaso. Patient has known episodes of VT and had recurrent NSVT/VT when he first arrived started on lidocaine infusion with improvement. Of note he was previously on mexiletine 150 TID outpatient, metoprolol ER 25 qAM and 50 qPM. Previous history of thyrotoxicosis on amiodarone.    He was found to be bacteremic with recurrent serratia, on IV abx possibly r/t LVAD infection vs splenic abscess noted on CT 1/19. Afebrile since 3/1. Vasopressin weaned off 3/4, started on midodrine which has now been off since 3/8. LVAD without s/s of pump malfunction. Currently he opens his eyes however remains nonverbal and not following commands. His mental status is still not back to baseline. His VT has now subsided, last episode was noted on 3/12.  His hyponatremia and hyperkalemia are overall improving after receiving the following treatment insulin/D50 and lokelma.  Recently his hemoglobin has continued to drop, will be transfused with 1 uPRBC on 3/16. Overall fairly stable from HF perspective. 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 After family discussion on 3/17, family wishes to continues course of treatment and are wanting to proceed with PEG placement however after further discussion with GI and IR patient is not a candidate.

## 2022-03-29 NOTE — PROGRESS NOTE ADULT - ASSESSMENT
Mental Status Exam: Seen lying in bed on 2 DSU. Awake, making eye contact. Unable to evaluate orientation. Nonverbal. Unable to complete full assessment.    Past psychiatric history: Denies as per chart.     Psychological assessment: Awake, looking at TV. Looked at writer when name called. Non verbal.   As per staff members, patient asked one word question, "what" earlier today.    Unable to complete full assessment due to difficulty communicating.     Dx: Adjustment disorder, unspecified. F43.20; r/o Delirium, F06; Systolic Heart failure I50.2; LVAD Z95.811     Recommendations:   Continue to engage with patient as often as possible  May benefit from Palliative consult to discuss his goals of care  Holistic RN  Behavioral cardiology will follow as needed       Mental Status Exam: Seen lying in bed on 2 DSU. Awake, making eye contact. Unable to evaluate orientation. Nonverbal. Unable to complete full assessment. Did not follow simple commands. Returned later in day. No significant changes. Did make and sustain eye contact when spoken to.     Past psychiatric history: Denies (as per previous assessments).     Psychological assessment: Awake, looking at TV. Looked at writer when name called. Non verbal. Attempts to engage with patient verbally unsuccessful. Did maintain eye contact. Unable to follow simple commands (raise your hand, close your eyes). As per staff members, patient verbal earlier today, stated "what." Unable to complete full assessment.     Dx: Adjustment disorder, unspecified. F43.20; r/o Delirium, F06; Systolic Heart failure I50.2; LVAD Z95.811     Recommendations:   Continue to engage with patient as often as possible  May benefit from Palliative consult to discuss his goals of care  Holistic RN  Behavioral cardiology will follow as needed      16 minutes spent on total patient encounter

## 2022-03-29 NOTE — PROGRESS NOTE ADULT - SUBJECTIVE AND OBJECTIVE BOX
RICKY JOINT  MRN#: [id number]  Subjective: pulmonary progress note  : acute hypercapnic respiratory failure . Septic shock , service rendered on 2002   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 s/p HM2 LVAD in 2017 at  (due to severe PAD) with TV ring, multiple GI bleeds, thus maintained off AC, CKD 3prior COVID-19 infection in 2020, recurrent syncope post LVAD s/p ILR, s/p prolonged complex hospitalization from -2021 initially admitted with abdominal pain complicated by GIB, respiratory failure s/p trach, multiple infections, s/p cholecystotomy tube and SMA stent 10/2021, ultimately discharged to [facility] rehab and then returned to [facility] for trach decannulation , readmitted in 2022 with recurrent COVID-19 infection, initially in distributive shock. Blood cultures were also positive for serratia marcescens which he has had in the past. seen by heart failure team patient is DNR/DNI  continue on 3 liter nasal canula flat Affect , hyperkalemia lokelma , runs of V-Tach. more stable  very weak , non verbal non communicative on tube feeding , leg booting, CT scan of chest mucous bulging  on Antibiotic and incentive spirometry  , patient receiving one unit of blood transfusion , sleeping at round .RT opacity infiltrate on Antibiotic  , condition is the same ,wound care note appreciated , NG tube was replaced continue on tube feeding .wound care follow up appreciated , chest x ray no evidence of atelectasis , patient can have PEG placement , more responsive , awake  no fever no over night events          (2022 22:20)    PAST MEDICAL & SURGICAL HISTORY:  CHF (congestive heart failure)    CAD (coronary artery disease)  Depression    Pleural effusion    History of 2019 novel coronavirus disease (COVID-19)  2020    Hemorrhoids    Bleeding hemorrhoids    Peripheral arterial disease    Claudication    BPH with urinary obstruction    ACC/AHA stage D systolic heart failure    Anticoagulation goal of INR 2.0 to 2.5    Falls    Clavicle fracture    CKD (chronic kidney disease), stage III    Iron deficiency anemia    H/O epistaxis    Vertigo    GI bleed    S/P TVR (tricuspid valve repair)    S/P ventricular assist device    S/P endoscopy    H/O tracheostomy        FAMILY HISTORY:    Social History:    Marital Status:  X    (   ) Single    (   )    (  )   Occupation: Retired   Lives with: (  ) alone  (  ) children  X spouse   (  ) parents  (  ) other    Substance Use (street drugs): X never used  (  ) other:  Tobacco Usage: X never smoked   (   ) former smoker   (   ) current smoker  (     ) pack year  (    ) last cigarette date  Alcohol Usage: Social         OBJECTIVE:  ICU Vital Signs Last 24 Hrs  T(C): 37.2 (01 Mar 2022 16:00), Max: 38.4 (01 Mar 2022 09:00)  T(F): 99 (01 Mar 2022 16:00), Max: 101.1 (01 Mar 2022 09:00)  HR: 109 (01 Mar 2022 18:00) (67 - 124)  BP: --  BP(mean): 86 (2022 20:00) (86 - 86)  ABP: 89/74 (01 Mar 2022 18:00) (75/63 - 160/85)  ABP(mean): 81 (01 Mar 2022 18:00) (68 - 139)  RR: 38 (01 Mar 2022 18:00) (5 - 40)  SpO2: 99% (01 Mar 2022 18:00) (94% - 100%)       @ 07: @ 07:00  --------------------------------------------------------  IN: 1060.7 mL / OUT: 1600 mL / NET: -539.3 mL     @ 07: @ 18:22  --------------------------------------------------------  IN: 677.8 mL / OUT: 455 mL / NET: 222.8 mL    PHYSICAL EXAM :Daily Height in cm: 177.8 (2022 21:20)  Elderly frail male     Daily Weight in k.7 (2022 21:20)  HEENT:     + NCAT  + EOMI  - Conjuctival edema   - Icterus   - Thrush   - ETT  - NGT/OGT  Neck:         + FROM RT IJ  JVD     - Nodes     - Masses    + Mid-line trachea   - Tracheostomy  Chest:           mild kyphosis   Lungs:          + CTA  + Rhonchi  + Rales    - Wheezing  + Decreased BS   - Dullness R L  Cardiac:       + S1 + S2    + RRR   - Irregular   - S3  - S4    - Murmurs   - Rub   - Hamman’s sign   Abdomen:    + BS     + Soft    + Non-tender  - Distended  - Organomegaly  - PEG Cholecystostomy tube in place   Extremities:   - Cyanosis U/L   - Clubbing  U/L  - LE/UE Edema   + Capillary refill    + Pulses   Neuro:        + Awake   +  Alert   - Confused   - Lethargic   - Sedated   - Generalized Weakness  Skin:        - Rashes    - Erythema   + Normal incisions   + IV sites intact + sacral and bilateral wound       HOSPITAL MEDICATIONS: All mediciations reviewed and analyzed  MEDICATIONS  (STANDING):  albumin human  5% IVPB 250 milliLiter(s) IV Intermittent once  cefepime   IVPB 2000 milliGRAM(s) IV Intermittent every 12 hours  chlorhexidine 2% Cloths 1 Application(s) Topical <User Schedule>  cholecalciferol 1000 Unit(s) Oral daily  dextrose 40% Gel 15 Gram(s) Oral once  dextrose 50% Injectable 25 Gram(s) IV Push once  gabapentin Solution 100 milliGRAM(s) Oral three times a day  glucagon  Injectable 1 milliGRAM(s) IntraMuscular once  insulin lispro (ADMELOG) corrective regimen sliding scale   SubCutaneous every 6 hours  magnesium sulfate  IVPB 1 Gram(s) IV Intermittent once  methimazole 10 milliGRAM(s) Oral daily  metoclopramide 10 milliGRAM(s) Oral four times a day  mexiletine 150 milliGRAM(s) Oral every 8 hours  mirtazapine 7.5 milliGRAM(s) Oral at bedtime  multivitamin 1 Tablet(s) Oral daily  pantoprazole  Injectable 40 milliGRAM(s) IV Push daily  potassium phosphate IVPB 15 milliMole(s) IV Intermittent once  senna 2 Tablet(s) Oral at bedtime  sertraline 100 milliGRAM(s) Oral daily  vancomycin  IVPB 750 milliGRAM(s) IV Intermittent every 24 hours  vasopressin Infusion 0.02 Unit(s)/Min (1.2 mL/Hr) IV Continuous <Continuous>    MEDICATIONS  (PRN):    LABS: All Lab data reviewed and analyzed                         7.1    10.82 )-----------( 177      ( 29 Mar 2022 05:13 )             22.5   03-    136  |  100  |  58<H>  ----------------------------<  165<H>  4.6   |  26  |  0.91    Ca    9.6      29 Mar 2022 05:13  Mg     2.0     03-28      Ca    9.6      28 Mar 2022 06:43  Mg     2.0     03-28      Ca    9.5      27 Mar 2022 10:01  Mg     1.9     03-      Ca    9.1      25 Mar 2022 09:23  Mg     2.0     03-24      Ca    9.3      23 Mar 2022 04:22  Phos  2.3     03-23  Mg     1.5     -    TPro  9.1<H>  /  Alb  3.0<L>  /  TBili  0.4  /  DBili  x   /  AST  35  /  ALT  42  /  AlkPhos  220<H>  03-             PTT - ( 01 Mar 2022 00:08 )  PTT:25.0 sec LIVER FUNCTIONS - ( 01 Mar 2022 17:38 )  Alb: 2.9 g/dL / Pro: 7.3 g/dL / ALK PHOS: 116 U/L / ALT: 30 U/L / AST: 29 U/L / GGT: x           RADIOLOGY: - Reviewed and analyzed

## 2022-03-29 NOTE — PROGRESS NOTE ADULT - SUBJECTIVE AND OBJECTIVE BOX
Behavioral Cardiology Progress Note    History of present illness: Mr. Quispe is a 66-year-old male with PMHx of non-ischemic cardiomyopathy s/p LVAD, multiple GI bleeds, CKD, COVID-19 infection, recurrent Serratia bacteremia with persistent AMS. As per neurology, decreased LOC with non-localizing exam. Mental status has been unchanged.     Social history: , lives in sister’s house in [place]. Has 3 sons (2 live in , 1 in [facility]). Oldest son (Kang Quispe) lives in [place]. Not close with his children. One of 3 siblings. Lives in sister’s house in [place] (Cristina Rudolph [phone]), also in the home are niece ([facility] [phone]) and nephew (Miller Rudolph). Another sister lives close by in [place] and all other siblings live in [facility] which the family visit often. Prior to health issues worked full time at [employer] in [place], stopped working due to heart disease. Education: high school graduate.     Substance use: (information from previous assessment)  Tobacco: Denies. Former smoker, 8-10 cigarettes/day for 30 years, quit prior to LVAD 2017.   Alcohol: None for past 6 years. Past use of 3-4 drinks of Johnson 2x/week.  Drug: Denies any drug use.

## 2022-03-30 NOTE — PROGRESS NOTE ADULT - ATTENDING SUPERVISION STATEMENT
ACP
Fellow
Student
ACP
Fellow
ACP
Fellow
ACP
ACP
Fellow
ACP
Fellow
Fellow

## 2022-03-30 NOTE — PROGRESS NOTE ADULT - SUBJECTIVE AND OBJECTIVE BOX
[facility] Division of Hospital Medicine  Venessa Dalal DO pager [phone]    Patient is a 66y old  Male who presents with a chief complaint of Septic shock (30 Mar 2022 13:51)      SUBJECTIVE / OVERNIGHT EVENTS:  No acute overnight events.     MEDICATIONS  (STANDING):  ascorbic acid 500 milliGRAM(s) Oral daily  cefepime   IVPB 1000 milliGRAM(s) IV Intermittent every 12 hours  chlorhexidine 2% Cloths 1 Application(s) Topical <User Schedule>  cholecalciferol 2000 Unit(s) Oral daily  insulin lispro (ADMELOG) corrective regimen sliding scale   SubCutaneous every 6 hours  metoprolol tartrate 50 milliGRAM(s) Oral three times a day  mexiletine 150 milliGRAM(s) Oral every 8 hours  multivitamin 1 Tablet(s) Oral daily  pantoprazole  Injectable 40 milliGRAM(s) IV Push two times a day  senna 2 Tablet(s) Oral at bedtime  sertraline 100 milliGRAM(s) Oral daily  sodium chloride 0.9% Bolus 250 milliLiter(s) IV Bolus once  thiamine 100 milliGRAM(s) Oral daily  traMADol 25 milliGRAM(s) Oral once    MEDICATIONS  (PRN):  HYDROmorphone  Injectable 0.2 milliGRAM(s) IV Push every 2 hours PRN Moderate to Severe pain  polyethylene glycol 3350 17 Gram(s) Oral daily PRN Constipation      CAPILLARY BLOOD GLUCOSE      POCT Blood Glucose.: 138 mg/dL (30 Mar 2022 12:00)  POCT Blood Glucose.: 171 mg/dL (30 Mar 2022 06:46)  POCT Blood Glucose.: 178 mg/dL (30 Mar 2022 00:16)  POCT Blood Glucose.: 166 mg/dL (29 Mar 2022 17:04)    I&O's Summary    29 Mar 2022 07:01  -  30 Mar 2022 07:00  --------------------------------------------------------  IN: 455 mL / OUT: 865 mL / NET: -410 mL        PHYSICAL EXAM:  Vital Signs Last 24 Hrs  T(C): 36.9 (30 Mar 2022 12:04), Max: 37.1 (30 Mar 2022 08:30)  T(F): 98.4 (30 Mar 2022 12:04), Max: 98.7 (30 Mar 2022 08:30)  HR: 94 (30 Mar 2022 12:04) (94 - 110)  BP: 95/56 (30 Mar 2022 12:04) (95/56 - 98/54)  BP(mean): 74 (30 Mar 2022 12:06) (68 - 76)  RR: 16 (30 Mar 2022 12:04) (16 - 20)  SpO2: 98% (30 Mar 2022 12:04) (98% - 100%)    CONSTITUTIONAL: NAD, cachetic. Has NGT  RESPIRATORY: Normal respiratory effort; lungs are clear to auscultation bilaterally  CARDIOVASCULAR: Regular rate and rhythm, normal S1 and S2, no murmur/rub/gallop; No lower extremity edema  ABDOMEN: Nontender to palpation, normoactive bowel sounds, no rebound/guarding. No blood in biliary drain   MUSCULOSKELETAL: clubbing or cyanosis of digits; no joint swelling or tenderness to palpation  PSYCH: AOx0.  NEURO:  Opens eyes, appears to have more awareness - trying to move LE and blinking frequently     LABS:                        7.1    10.82 )-----------( 177      ( 29 Mar 2022 05:13 )             22.5     03-29    136  |  100  |  58<H>  ----------------------------<  165<H>  4.6   |  26  |  0.91    Ca    9.6      29 Mar 2022 05:13                  RADIOLOGY & ADDITIONAL TESTS:  Results Reviewed:   Imaging Personally Reviewed:  Electrocardiogram Personally Reviewed:    COORDINATION OF CARE:  Care Discussed with Consultants/Other Providers [Y/N]:  Prior or Outpatient Records Reviewed [Y/N]:

## 2022-03-30 NOTE — PROGRESS NOTE ADULT - SUBJECTIVE AND OBJECTIVE BOX
Patient seen and examined at bedside.    Overnight Events: no events, issues or complaints    Review of Systems:  [x ] Unable to assess ROS due to AMS    Current Meds:  ascorbic acid 500 milliGRAM(s) Oral daily  cefepime   IVPB 1000 milliGRAM(s) IV Intermittent every 12 hours  chlorhexidine 2% Cloths 1 Application(s) Topical <User Schedule>  cholecalciferol 2000 Unit(s) Oral daily  HYDROmorphone  Injectable 0.2 milliGRAM(s) IV Push every 2 hours PRN  insulin lispro (ADMELOG) corrective regimen sliding scale   SubCutaneous every 6 hours  metoprolol tartrate 50 milliGRAM(s) Oral three times a day  mexiletine 150 milliGRAM(s) Oral every 8 hours  multivitamin 1 Tablet(s) Oral daily  pantoprazole  Injectable 40 milliGRAM(s) IV Push two times a day  polyethylene glycol 3350 17 Gram(s) Oral daily PRN  senna 2 Tablet(s) Oral at bedtime  sertraline 100 milliGRAM(s) Oral daily  sodium chloride 0.9% Bolus 250 milliLiter(s) IV Bolus once  thiamine 100 milliGRAM(s) Oral daily  traMADol 25 milliGRAM(s) Oral once      PAST MEDICAL & SURGICAL HISTORY:  CHF (congestive heart failure)    CAD (coronary artery disease)    Depression    Pleural effusion    History of 2019 novel coronavirus disease (COVID-19)  april 2020    Hemorrhoids    Bleeding hemorrhoids    Peripheral arterial disease    Claudication    BPH with urinary obstruction    ACC/AHA stage D systolic heart failure    Anticoagulation goal of INR 2.0 to 2.5    Falls    Clavicle fracture    CKD (chronic kidney disease), stage III    Iron deficiency anemia    H/O epistaxis    Vertigo    GI bleed    S/P TVR (tricuspid valve repair)    S/P ventricular assist device    S/P endoscopy    H/O tracheostomy        Vitals:  T(F): 98.4 (03-30), Max: 98.7 (03-30)  HR: 94 (03-30) (94 - 110)  BP: 95/56 (03-30) (95/56 - 98/54)  RR: 16 (03-30)  SpO2: 98% (03-30)  I&O's Summary    29 Mar 2022 07:01  -  30 Mar 2022 07:00  --------------------------------------------------------  IN: 455 mL / OUT: 865 mL / NET: -410 mL        Physical Exam:  General: resting in bed, remains frail   Neck: Neck supple. JVP not elevated. No masses  Chest: Clear to auscultation bilaterally  CV: +VAD hum  Abdomen: Soft, non-distended, non-tender, +keotube  Neurology: awake but remains non verbal and not following commands                             7.1    10.82 )-----------( 177      ( 29 Mar 2022 05:13 )             22.5     03-29    136  |  100  |  58<H>  ----------------------------<  165<H>  4.6   |  26  |  0.91    Ca    9.6      29 Mar 2022 05:13

## 2022-03-30 NOTE — CHART NOTE - NSCHARTNOTEFT_GEN_A_CORE
Nutrition Follow Up Note  Patient seen for: length of stay follow up. Chart reviewed and events noted.    Per Chart: Pt is a 66M, PMH of Stage D 2/2 NICM s/p HM2 LVAD in 2017 at  (due to severe PAD) with TV ring, multiple GI bleeds, thus maintained off AC, CKD3, prior COVID-19 infection in 2020, recurrent syncope post LVAD s/p ILR, s/p prolonged complex hospitalization from -2021 initially admitted with abdominal pain complicated by GIB, respiratory failure s/p trach, multiple infections, s/p cholecystotomy tube and SMA stent 10/2021, ultimately discharged to [facility] rehab and then returned to [facility] for trach decannulation , readmitted in 2022 with recurrent COVID-19 infection, initially in distributive shock. Presented from rehab center due to AMS and tachycardia; was placed on BiPAP and transferred to [facility] CICU for concern for sepsis vs septic shock. Geriatrics and Palliative Medicine (GaP) Team was called for goals of care     Source: [] Patient       [x] Medical Record        [x] RN        [] Family at bedside       [] Other:     -If unable to interview patient: [] Trach/Vent/BiPAP  [x] Disoriented/confused/inappropriate to interview    Diet Order:   Diet, NPO:   Except Medications (22)  Pt was ordered for Jevity 1.5 at goal rate 45 mL/hr x24 hrs with Reji x2 daily and No Carb ProSource TF x1 daily (3/10-3/17).   NGT feeds held 3/13/2022 secondary to pt coughing and with SOB. Provider to RN to resume feeds on 3/13.   Made NPO after midnight on 3/17 for PEG.   Per Internal 3/18, "new septic shock due to RLL pneumonia. possible aspiration pneumonia or mucous plugging. tube feeds on hold"  Feeds on hold for now per provider     - Is current order appropriate/adequate? [] Yes  [x]  No:     - Nutrition-related concerns:      - On ascorbic acid, cholecalciferol, thiamine, and multivitamin       - On antibiotics      - Elevated blood glucose noted 3/17 - received vancomycin in dextrose 5%. On sliding scale of insulin       - Pt with elevated Na. Ordered for free water 300 mL q 6 hours.       - No documented feeds running since 3/13. Per chart note 3/18 "PEG canceled as patient is febrile again, worsening leukocytosis and very tachycardic." Per heart failure 3/18 "Family meeting has been arranged for tomorrow to discuss GOC and next steps."    GI:  Last BM documented on 3/9 - provider and RN made aware.   Bowel Regimen? [] Yes   [x] No  Drain (dawn): 50 mL (3/18), 25 mL (3/17), 140 mL (3/16), 100 mL (3/15), 55 mL (3/14), 0 mL (3/13)    Weights:   Daily Weight in k.2 (), 57.3 (), 54.4 ()  Dosing Weight: 107.3 Ibs/48.7 kg (-)  BMI (kg/m2): 15.4 (-)  Ideal Body Weight: 166 Ibs/75.3 kg  Pt with wt fluctuations noted during admission likely secondary to fluid shifts (varying edema during admission) vs true wt loss vs bed scale discrepancy  RD will continue to trend as new wts available/able.      Nutritionally Pertinent MEDICATIONS  (STANDING):  ascorbic acid  cholecalciferol  dextrose 5%.  insulin lispro (ADMELOG) corrective regimen sliding scale  meropenem  IVPB  metoprolol tartrate  mexiletine  multivitamin  pantoprazole  Injectable  senna  thiamine  vancomycin  IVPB    Pertinent Labs:  @ 06:24: Na 150<H>, BUN 72<H>, Cr 1.20, <H>, K+ 4.3, Alk Phos 318<H>, ALT/SGPT 149<H>, AST/SGOT 210<H>    A1C with Estimated Average Glucose Result: 5.3 % (22 @ 22:11)    Finger Sticks:  POCT Blood Glucose.: 140 mg/dL ( @ 05:54)  POCT Blood Glucose.: 142 mg/dL ( @ 03:35)  POCT Blood Glucose.: 139 mg/dL ( @ 21:41)  POCT Blood Glucose.: 214 mg/dL ( @ 16:53)    Skin per nursing documentation: LVAD driveline site, + trach stoma, per wound care on 3/4: Sacral/bilateral buttocks deep tissue injuries, Peg. heel deep tissue injury, scrotal wound  Edema per nursing documentation: None noted.     Estimated Needs:   [x] no change since previous assessment  Based on dosing wt 48.7 kG  Estimated Energy Needs: (30-35 kcals/kG) 4906-7168 kcals   Estimated Protein Needs: (1.4-1.9 gm/kG) 68.18-92.53 gm  Fluid needs deferred to provider.     Previous Nutrition Diagnosis: Malnutrition severe, chronic + Underweight   Nutrition Diagnosis is: [x] ongoing  [] resolved [] not applicable     Nutrition Care Plan:  [] In Progress  [] Achieved  [x] Not applicable - current medical condition precludes RD intervention at this time.     New Nutrition Diagnosis: [x] Not applicable    Nutrition Interventions:     Education Provided   [] Yes:  [x] No: Not appropriate     Recommendations:      1) When medically feasible, resume feeds of Jevity 1.5 at 10 mL/hr, increasing only as tolerated to goal rate of 45 mL/hr x 24hrs + 1 No Carb Prosource TF. At goal to provide 1080 ml formula, 1660 kcals, 80 gm Protein, and 821 mL free water. Meets 34 kcals/kG & 1.6 gm protein/kG based on dosing wt 48.7 kG.  --> Monitor GOC  --> Keep HOB elevated >45 degrees during feeds.  --> Fluid needs deferred to provider.   2) As medically feasible, continue to provide micronutrients.   3) If medically feasible, recommend Reji x2 daily.     Monitoring and Evaluation:   Continue to monitor nutritional intake, tolerance to diet prescription, weights, labs, skin integrity    RD remains available upon request and will follow up per protocol  Kiki Barajas, MS, RD, CDN, [facility] Pager #[phone] Nutrition Follow Up Note  Patient seen for: length of stay follow up. Chart reviewed and events noted.    Per Chart: Pt is a 66M, PMH of Stage D 2/2 NICM s/p HM2 LVAD in 2017 at  (due to severe PAD) with TV ring, multiple GI bleeds, thus maintained off AC, CKD3, prior COVID-19 infection in 2020, recurrent syncope post LVAD s/p ILR, s/p prolonged complex hospitalization from -2021 initially admitted with abdominal pain complicated by GIB, respiratory failure s/p trach, multiple infections, s/p cholecystotomy tube and SMA stent 10/2021, ultimately discharged to [facility] rehab and then returned to [facility] for trach decannulation , readmitted in 2022 with recurrent COVID-19 infection, initially in distributive shock. Presented from rehab center due to AMS and tachycardia; was placed on BiPAP and transferred to [facility] CICU for concern for sepsis vs septic shock. Geriatrics and Palliative Medicine (GaP) Team was called for goals of care     Source: [] Patient       [x] Medical Record        [x] RN        [] Family at bedside       [] Other:     -If unable to interview patient: [] Trach/Vent/BiPAP  [x] Disoriented/confused/inappropriate to interview    Diet Order:   Jevity 1.5 at goal rate 45 mL/hr x24 hrs  Reji x2 daily   ProSource NoCarb TF x1 daily   EN: Jevity 1.5 at goal rate 45 mL/hr x24 hours to provide total volume 1080 mL, 1620 kcals, 69 gm protein, and 821 mL free water.  Protein Modular: 40 kcals and 11 gm protein  EN+Protein Modular Provides: 1080 mL, 1660 kcals (34 kcals/kG based on dosing wt 48.7 kG), 80 gm protein (1.64 gm protein/kG based on dosing wt 48.7 kG), and 821 mL free water.    - Is current order appropriate/adequate? [x] Yes  []  No:     - Nutrition-related concerns:      - On ascorbic acid, cholecalciferol, thiamine, and multivitamin       - On antibiotics      - Elevated blood glucose noted 3/17 - received vancomycin in dextrose 5%. On sliding scale of insulin       - Pt with elevated Na. Ordered for free water 300 mL q 6 hours.       - No documented feeds running since 3/13. Per chart note 3/18 "PEG canceled as patient is febrile again, worsening leukocytosis and very tachycardic." Per heart failure 3/18 "Family meeting has been arranged for tomorrow to discuss GOC and next steps."    GI:  Last BM documented on 3/9 - provider and RN made aware.   Bowel Regimen? [] Yes   [x] No  Drain (dawn): 50 mL (3/18), 25 mL (3/17), 140 mL (3/16), 100 mL (3/15), 55 mL (3/14), 0 mL (3/13)    Weights:   Daily Weight in k.4 (3/25), 47.2 (), 57.3 (), 54.4 ()  Dosing Weight: 107.3 Ibs/48.7 kg ()  BMI (kg/m2): 15.4 (-)  Ideal Body Weight: 166 Ibs/75.3 kg  Pt with wt fluctuations noted during admission likely secondary to fluid shifts (varying edema during admission) vs true wt loss vs bed scale discrepancy  RD will continue to trend as new wts available/able.      Pertinent Medications:   ascorbic acid  cholecalciferol  insulin lispro (ADMELOG) corrective regimen sliding scale  meropenem  IVPB  metoprolol tartrate  mexiletine  multivitamin  pantoprazole  Injectable  senna  sodium chloride 0.9% Bolus  thiamine    Pertinent Labs:  @ 06:24: Na 150<H>, BUN 72<H>, Cr 1.20, <H>, K+ 4.3, Alk Phos 318<H>, ALT/SGPT 149<H>, AST/SGOT 210<H>    A1C with Estimated Average Glucose Result: 5.3 % (22 @ 22:11)    Finger Sticks:  POCT Blood Glucose.: 140 mg/dL ( @ 05:54)  POCT Blood Glucose.: 142 mg/dL ( @ 03:35)  POCT Blood Glucose.: 139 mg/dL ( @ 21:41)  POCT Blood Glucose.: 214 mg/dL ( @ 16:53)    Skin per nursing documentation: LVAD driveline site, + trach stoma, per wound care on 3/4: Sacral/bilateral buttocks deep tissue injuries, Peg. heel deep tissue injury, scrotal wound  Edema per nursing documentation: None noted.     Estimated Needs:   [x] no change since previous assessment  Based on dosing wt 48.7 kG  Estimated Energy Needs: (30-35 kcals/kG) 0315-7578 kcals   Estimated Protein Needs: (1.4-1.9 gm/kG) 68.18-92.53 gm  Fluid needs deferred to provider.     Previous Nutrition Diagnosis: Malnutrition severe, chronic + Underweight   Nutrition Diagnosis is: [x] ongoing  [] resolved [] not applicable     Nutrition Care Plan:  [] In Progress  [] Achieved  [x] Not applicable - current medical condition precludes RD intervention at this time.     New Nutrition Diagnosis: [x] Not applicable    Nutrition Interventions:     Education Provided   [] Yes:  [x] No: Not appropriate     Recommendations:      1) When medically feasible, resume feeds of Jevity 1.5 at 10 mL/hr, increasing only as tolerated to goal rate of 45 mL/hr x 24hrs + 1 No Carb Prosource TF. At goal to provide 1080 ml formula, 1660 kcals, 80 gm Protein, and 821 mL free water. Meets 34 kcals/kG & 1.6 gm protein/kG based on dosing wt 48.7 kG.  --> Monitor GOC  --> Keep HOB elevated >45 degrees during feeds.  --> Fluid needs deferred to provider.   2) As medically feasible, continue to provide micronutrients.   3) If medically feasible, recommend Reji x2 daily.     Monitoring and Evaluation:   Continue to monitor nutritional intake, tolerance to diet prescription, weights, labs, skin integrity    RD remains available upon request and will follow up per protocol  Kiki Barajas, MS, RD, CDN, [facility] Pager #[phone] Nutrition Follow Up Note  Patient seen for: length of stay follow up. Chart reviewed and events noted.    Per Chart: Pt is a 66M, PMH of Stage D 2/2 NICM s/p HM2 LVAD in 2017 at  (due to severe PAD) with TV ring, multiple GI bleeds, thus maintained off AC, CKD3, prior COVID-19 infection in 2020, recurrent syncope post LVAD s/p ILR, s/p prolonged complex hospitalization from -2021 initially admitted with abdominal pain complicated by GIB, respiratory failure s/p trach, multiple infections, s/p cholecystotomy tube and SMA stent 10/2021, ultimately discharged to [facility] rehab and then returned to [facility] for trach decannulation , readmitted in 2022 with recurrent COVID-19 infection, initially in distributive shock. Presented from rehab center due to AMS and tachycardia; was placed on BiPAP and transferred to [facility] CICU for concern for sepsis vs septic shock. Receiving NGT feedings, per GI/IR too high risk for PEG placement.     Source: [] Patient       [x] Medical Record        [x] RN        [] Family at bedside       [] Other:     -If unable to interview patient: [] Trach/Vent/BiPAP  [x] Disoriented/confused/inappropriate to interview    Diet Order:   Diet, NPO with Tube Feed:   Tube Feeding Modality: Nasogastric  Jevity 1.5 Tank (JEVITY1.5RTH)  Total Volume for 24 Hours (mL): 1080  Continuous  Starting Tube Feed Rate {mL per Hour}: 20  Increase Tube Feed Rate by (mL): 10     Every 4 hours  Until Goal Tube Feed Rate (mL per Hour): 45  Tube Feed Duration (in Hours): 24  Tube Feed Start Time: 17:00  Reji(7 Gm Arginine/7 Gm Glut/1.2 Gm HMB     Qty per Day:  2  No Carb Prosource TF     Qty per Day:  1 (22)    EN: Jevity 1.5 at goal rate 45 mL/hr x24 hours to provide total volume 1080 mL, 1620 kcals, 69 gm protein, and 821 mL free water.  Protein Modular: 40 kcals and 11 gm protein  EN+Protein Modular Provides: 1080 mL, 1660 kcals (34 kcals/kG based on dosing wt 48.7 kG), 80 gm protein (1.64 gm protein/kG based on dosing wt 48.7 kG), and 821 mL free water.  Per RN, pt receiving EN at goal and tolerating     - Is current order appropriate/adequate? [x] Yes  []  No:     - Nutrition-related concerns:      - On ascorbic acid, cholecalciferol, thiamine, and multivitamin       - On antibiotics      - Elevated blood glucose noted during admission- receiving medication in dextrose 5%. On sliding scale of insulin     GI:  Last BM documented on 3/27.   Bowel Regimen? [x] Yes   [] No  Drain (dawn): 15 mL (3/29), 70 mL (3/27), 28 mL (3/26)    Weights:   Daily Weight in k.5 (), 47.7 (), 52.3 (), 47.2 (), 57.3 (), 54.4 ()  Dosing Weight: 107.3 Ibs/48.7 kg ()  BMI (kg/m2): 15.4 ()  Ideal Body Weight: 166 Ibs/75.3 kg  Pt with wt fluctuations noted during admission likely secondary to fluid shifts (varying edema during admission) vs true wt loss vs bed scale discrepancy  RD will continue to trend as new wts available/able.      Nutritionally Pertinent MEDICATIONS  (STANDING):  ascorbic acid  cefepime   IVPB  cholecalciferol  insulin lispro (ADMELOG) corrective regimen sliding scale  metoprolol tartrate  mexiletine  multivitamin  pantoprazole  Injectable  senna  sodium chloride 0.9% Bolus  thiamine    Pertinent Labs:  Na 136 mmol/L Glu 165 mg/dL<H> K+ 4.6 mmol/L Cr  0.91 mg/dL BUN 58 mg/dL<H> Hgb 7.1 g/dL<L> Hct 22.5 %<L>  A1C with Estimated Average Glucose Result: 5.3 % (22 @ 22:11)    Finger Sticks:  POCT Blood Glucose.: 138 mg/dL ( @ 12:00)  POCT Blood Glucose.: 171 mg/dL ( @ 06:46)  POCT Blood Glucose.: 178 mg/dL ( @ 00:16)  POCT Blood Glucose.: 166 mg/dL ( @ 17:04)    Skin per nursing documentation: LVAD driveline site, + trach stoma. Per wound care on 3/25: Rt Buttocks - evolving deep tissue injuries/ unstageable pressure injuries, Lt buttock/sacrum - stage 2 pressure injury  Edema per nursing documentation: None noted.     Estimated Needs:   [x] no change since previous assessment  Based on dosing wt 48.7 kG  Estimated Energy Needs: (30-35 kcals/kG) 5517-0052 kcals   Estimated Protein Needs: (1.4-1.9 gm/kG) 68.18-92.53 gm  Fluid needs deferred to provider.     Previous Nutrition Diagnosis: Malnutrition severe, chronic + Underweight   Nutrition Diagnosis is: [x] ongoing  [] resolved [] not applicable     Nutrition Care Plan:  [x] In Progress  [] Achieved  [] Not applicable    New Nutrition Diagnosis: [x] Not applicable    Nutrition Interventions:     Education Provided   [] Yes:  [x] No: Not appropriate     Recommendations:      1) Continue feeds of Jevity 1.5 at 45 mL/hr x 24hrs + 1 No Carb Prosource TF. At goal to provide 1080 ml formula, 1660 kcals, 80 gm Protein, and 821 mL free water. Meets 34 kcals/kG & 1.6 gm protein/kG based on dosing wt 48.7 kG.  --> Monitor GOC  --> Keep HOB elevated >45 degrees during feeds.  --> Fluid needs deferred to provider.   2) As medically feasible, continue to provide micronutrients.   3) As medically feasible, continue to provide Reji x2 daily.     Monitoring and Evaluation:   Continue to monitor nutritional intake, tolerance to diet prescription, weights, labs, skin integrity    RD remains available upon request and will follow up per protocol  Kiki Barajas, MS, RD, CDN, [facility] Pager #[phone]

## 2022-03-30 NOTE — CONSULT NOTE ADULT - PROVIDER SPECIALTY LIST ADULT
ENT
Intervent Radiology
Ethics
Wound Care
Pulmonology
Endocrinology
Neurology
Palliative Care
Psychology
Electrophysiology
Gastroenterology
Infectious Disease
Nephrology
Podiatry
Heart Failure

## 2022-03-30 NOTE — PROGRESS NOTE ADULT - PROBLEM SELECTOR PLAN 4
cw cefepime- serratia bacteremia - source likely LVAD  Completed 7 days meropenem  With leukocytosis but afebrile, suspect reactive

## 2022-03-30 NOTE — CONSULT NOTE ADULT - CONSULT REQUESTED DATE/TIME
01-Mar-2022 16:25
11-Mar-2022 10:56
14-Mar-2022
21-Mar-2022 11:27
28-Feb-2022 03:33
28-Feb-2022 19:44
22-Mar-2022 16:03
28-Feb-2022 17:20
04-Mar-2022 14:05
02-Mar-2022 14:11
11-Mar-2022 14:48
28-Feb-2022 12:22
29-Mar-2022 17:00
01-Mar-2022 18:17
28-Feb-2022 02:58

## 2022-03-30 NOTE — PROGRESS NOTE ADULT - ASSESSMENT
66 years old male with PMHx of Stage D HF 2/2 NICM s/p HM2 LVAD in 9/2017 at  (due to severe PAD) with TV ring, multiple GI bleeds, no AC, CKD 3 prior COVID-19 infection in 4/2020, chronic cholecystitis s/p percutaneous cholecystostomy tube, recurrent COVID infection, Serratia bacteremia transferred from [facility] for sepsis.    ID is consulted for septic shock  Recently had Serratia bacteremia discharged on suppressive PO Levaquin  Returned with leukocytosis, fever, AMS, hypotension requiring pressors  ?purulent sputum coming out from previous trach site  CXR no PNA  CTH unrevealing  LVAD exit sites shows no signs of infection  Perc dawn tube site shows no signs of infection  BCx Serratia, S cefepime and ertapenem, R quinolones  CT showed abdominal aortic thrombus and a 2.6cm splenic lesion    Antibiotics:  Vancomycin 2/28 - 3/1  Zosyn 2/28  Cefepime 2/28 -     Currently unclear etiology of septic shock likely secondary to recurrent serratia bacteremia  Source of serratia is could be LVAD, infected aortic thrombus or splenic lesion  Regardless will treat this as in intravascular infection so likely 4 - 6 weeks in total    IMPRESSION:  Septic shock 2/2 serratia bacteremia  Aortic thrombus  Splenic lesion  Leukocytosis  Fever  LVAD      RECOMMENDATIONS:  Patient with new fevers, tachycardia, tachypnea    differential aspiration event in the setting of NG feeds, bacteremia secondary to a line source, COVID acquisition intra-abdominal process    Sent blood cultures x2 sets 3/17 with NGTD  CT of chest suggestive of mucus plugging with possible aspiration pneumonia event  Can discontinue the Vancomycin as no evidence of MRSA on blood cultures  If blood cultures are finalized from 3/17 as negative would discontinue the Vancomycin and complete a course of Meorpenem x7days prior to de-escalate back to cover prior serratia bacteremia /driveline infection    Patient's PEG  placement deferred by GI given overall state of health        Morris Prater MD  Can be called via Teams  After 5pm/weekends [phone]   66 years old male with PMHx of Stage D HF 2/2 NICM s/p HM2 LVAD in 9/2017 at  (due to severe PAD) with TV ring, multiple GI bleeds, no AC, CKD 3 prior COVID-19 infection in 4/2020, chronic cholecystitis s/p percutaneous cholecystostomy tube, recurrent COVID infection, Serratia bacteremia transferred from [facility] for sepsis.    ID is consulted for septic shock  Recently had Serratia bacteremia discharged on suppressive PO Levaquin  Returned with leukocytosis, fever, AMS, hypotension requiring pressors  ?purulent sputum coming out from previous trach site  CXR no PNA  CTH unrevealing  LVAD exit sites shows no signs of infection  Perc dawn tube site shows no signs of infection  BCx Serratia, S cefepime and ertapenem, R quinolones  CT showed abdominal aortic thrombus and a 2.6cm splenic lesion    Antibiotics:  Vancomycin 2/28 - 3/1  Zosyn 2/28  Cefepime 2/28 -     Currently unclear etiology of septic shock likely secondary to recurrent serratia bacteremia  Source of serratia is could be LVAD, infected aortic thrombus or splenic lesion  Regardless will treat this as in intravascular infection so likely 4 - 6 weeks in total    IMPRESSION:  Septic shock 2/2 serratia bacteremia  Aortic thrombus  Splenic lesion  Leukocytosis  Fever  LVAD      RECOMMENDATIONS:  Patient with new fevers, tachycardia, tachypnea    differential aspiration event in the setting of NG feeds, bacteremia secondary to a line source, COVID acquisition intra-abdominal process    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

## 2022-03-30 NOTE — PROGRESS NOTE ADULT - ASSESSMENT
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 s/p HM2 LVAD in 9/2017 at  (due to severe PAD) with TV ring, multiple GI bleeds, thus maintained off AC, prior COVID-19 infection in 4/2020, recurrent syncope post LVAD s/p ILR, prolonged complex hospitalization from 6/14-11/16/2021 with GIB, respiratory failure s/p trach decanulated 12/2021, multiple infections, s/p cholecystotomy tube and SMA stent 10/2021, recurrent COVID 19 reinfection s/p MAB/remdesivir/steroids and distributive shock with serratia bacteremia, recurrent VT on mexiletine transferred from [facility] ED with metabolic encephalopathy, hypercarbic respiratory failure weaned off bipap, septic shock requiring pressors (now on midodrine) with serratia bacteremia on cefepime, intermittent h/h drop s/p total 3units prbc (last 3/6), episodes of recurrent VT s/p lidocaine gtt now on mexiletine. Receiving NGT feedings, per GI/IR too high risk for PEG placement

## 2022-03-30 NOTE — PROGRESS NOTE ADULT - SUBJECTIVE AND OBJECTIVE BOX
RICKY JOINT  MRN#: [id number]  Subjective: pulmonary progress note  : acute hypercapnic respiratory failure . Septic shock , service rendered on 2002   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 s/p HM2 LVAD in 2017 at  (due to severe PAD) with TV ring, multiple GI bleeds, thus maintained off AC, CKD 3prior COVID-19 infection in 2020, recurrent syncope post LVAD s/p ILR, s/p prolonged complex hospitalization from -2021 initially admitted with abdominal pain complicated by GIB, respiratory failure s/p trach, multiple infections, s/p cholecystotomy tube and SMA stent 10/2021, ultimately discharged to [facility] rehab and then returned to [facility] for trach decannulation , readmitted in 2022 with recurrent COVID-19 infection, initially in distributive shock. Blood cultures were also positive for serratia marcescens which he has had in the past. seen by heart failure team patient is DNR/DNI  continue on 3 liter nasal canula flat Affect , hyperkalemia lokelma , runs of V-Tach. more stable  very weak , non verbal non communicative on tube feeding , leg booting, CT scan of chest mucous bulging  on Antibiotic and incentive spirometry  , patient receiving one unit of blood transfusion , sleeping at round .RT opacity infiltrate on Antibiotic  , condition is the same ,wound care note appreciated , NG tube was replaced continue on tube feeding .wound care follow up appreciated , chest x ray no evidence of atelectasis , patient can have PEG placement , more responsive , awake  no fever no new  over night events          (2022 22:20)    PAST MEDICAL & SURGICAL HISTORY:  CHF (congestive heart failure)    CAD (coronary artery disease)  Depression    Pleural effusion    History of 2019 novel coronavirus disease (COVID-19)  2020    Hemorrhoids    Bleeding hemorrhoids    Peripheral arterial disease    Claudication    BPH with urinary obstruction    ACC/AHA stage D systolic heart failure    Anticoagulation goal of INR 2.0 to 2.5    Falls    Clavicle fracture    CKD (chronic kidney disease), stage III    Iron deficiency anemia    H/O epistaxis    Vertigo    GI bleed    S/P TVR (tricuspid valve repair)    S/P ventricular assist device    S/P endoscopy    H/O tracheostomy        FAMILY HISTORY:    Social History:    Marital Status:  X    (   ) Single    (   )    (  )   Occupation: Retired   Lives with: (  ) alone  (  ) children  X spouse   (  ) parents  (  ) other    Substance Use (street drugs): X never used  (  ) other:  Tobacco Usage: X never smoked   (   ) former smoker   (   ) current smoker  (     ) pack year  (    ) last cigarette date  Alcohol Usage: Social         OBJECTIVE:  ICU Vital Signs Last 24 Hrs  T(C): 37.2 (01 Mar 2022 16:00), Max: 38.4 (01 Mar 2022 09:00)  T(F): 99 (01 Mar 2022 16:00), Max: 101.1 (01 Mar 2022 09:00)  HR: 109 (01 Mar 2022 18:00) (67 - 124)  BP: --  BP(mean): 86 (2022 20:00) (86 - 86)  ABP: 89/74 (01 Mar 2022 18:00) (75/63 - 160/85)  ABP(mean): 81 (01 Mar 2022 18:00) (68 - 139)  RR: 38 (01 Mar 2022 18:00) (5 - 40)  SpO2: 99% (01 Mar 2022 18:00) (94% - 100%)       @ : @ 07:00  --------------------------------------------------------  IN: 1060.7 mL / OUT: 1600 mL / NET: -539.3 mL     @ 07: @ 18:22  --------------------------------------------------------  IN: 677.8 mL / OUT: 455 mL / NET: 222.8 mL    PHYSICAL EXAM :Daily Height in cm: 177.8 (2022 21:20)  Elderly frail male     Daily Weight in k.7 (2022 21:20)  HEENT:     + NCAT  + EOMI  - Conjuctival edema   - Icterus   - Thrush   - ETT  - NGT/OGT  Neck:         + FROM RT IJ  JVD     - Nodes     - Masses    + Mid-line trachea   - Tracheostomy  Chest:           mild kyphosis   Lungs:          + CTA  + Rhonchi  + Rales    - Wheezing  + Decreased BS   - Dullness R L  Cardiac:       + S1 + S2    + RRR   - Irregular   - S3  - S4    - Murmurs   - Rub   - Hamman’s sign   Abdomen:    + BS     + Soft    + Non-tender  - Distended  - Organomegaly  - PEG Cholecystostomy tube in place   Extremities:   - Cyanosis U/L   - Clubbing  U/L  - LE/UE Edema   + Capillary refill    + Pulses   Neuro:        + Awake   +  Alert   - Confused   - Lethargic   - Sedated   - Generalized Weakness  Skin:        - Rashes    - Erythema   + Normal incisions   + IV sites intact + sacral and bilateral wound       HOSPITAL MEDICATIONS: All mediciations reviewed and analyzed  MEDICATIONS  (STANDING):  albumin human  5% IVPB 250 milliLiter(s) IV Intermittent once  cefepime   IVPB 2000 milliGRAM(s) IV Intermittent every 12 hours  chlorhexidine 2% Cloths 1 Application(s) Topical <User Schedule>  cholecalciferol 1000 Unit(s) Oral daily  dextrose 40% Gel 15 Gram(s) Oral once  dextrose 50% Injectable 25 Gram(s) IV Push once  gabapentin Solution 100 milliGRAM(s) Oral three times a day  glucagon  Injectable 1 milliGRAM(s) IntraMuscular once  insulin lispro (ADMELOG) corrective regimen sliding scale   SubCutaneous every 6 hours  magnesium sulfate  IVPB 1 Gram(s) IV Intermittent once  methimazole 10 milliGRAM(s) Oral daily  metoclopramide 10 milliGRAM(s) Oral four times a day  mexiletine 150 milliGRAM(s) Oral every 8 hours  mirtazapine 7.5 milliGRAM(s) Oral at bedtime  multivitamin 1 Tablet(s) Oral daily  pantoprazole  Injectable 40 milliGRAM(s) IV Push daily  potassium phosphate IVPB 15 milliMole(s) IV Intermittent once  senna 2 Tablet(s) Oral at bedtime  sertraline 100 milliGRAM(s) Oral daily  vancomycin  IVPB 750 milliGRAM(s) IV Intermittent every 24 hours  vasopressin Infusion 0.02 Unit(s)/Min (1.2 mL/Hr) IV Continuous <Continuous>    MEDICATIONS  (PRN):    LABS: All Lab data reviewed and analyzed                        7.1    10.82 )-----------( 177      ( 29 Mar 2022 05:13 )             22.5                          7.1    10.82 )-----------( 177      ( 29 Mar 2022 05:13 )             22.5   0329    136  |  100  |  58<H>  ----------------------------<  165<H>  4.6   |  26  |  0.91    Ca    9.6      29 Mar 2022 05:13  Mg     2.0     03-28      Ca    9.6      28 Mar 2022 06:43  Mg     2.0     03-28      Ca    9.5      27 Mar 2022 10:01  Mg     1.9     03-26      Ca    9.1      25 Mar 2022 09:23  Mg     2.0     03-24      Ca    9.3      23 Mar 2022 04:22  Phos  2.3     03-23  Mg     1.5     -    TPro  9.1<H>  /  Alb  3.0<L>  /  TBili  0.4  /  DBili  x   /  AST  35  /  ALT  42  /  AlkPhos  220<H>  03-23             PTT - ( 01 Mar 2022 00:08 )  PTT:25.0 sec LIVER FUNCTIONS - ( 01 Mar 2022 17:38 )  Alb: 2.9 g/dL / Pro: 7.3 g/dL / ALK PHOS: 116 U/L / ALT: 30 U/L / AST: 29 U/L / GGT: x           RADIOLOGY: - Reviewed and analyzed

## 2022-03-30 NOTE — CONSULT NOTE ADULT - REASON FOR ADMISSION
Septic shock

## 2022-03-30 NOTE — CONSULT NOTE ADULT - CONSULT REASON
To assist in the ethical dilemma of a patient priorly known to ethics whose family desires PEG and patient is actively dying with known complications of  end stage cardiac disease and sepsis

## 2022-03-30 NOTE — PROGRESS NOTE ADULT - ATTENDING COMMENTS
Similar clinically, non-verbal.   Denies any pain  LVAD without any significant alarms  discussed with Palliative attending, will plan for family meeting to discuss the further care this week

## 2022-03-30 NOTE — CONSULT NOTE ADULT - SUBJECTIVE AND OBJECTIVE BOX
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 Patient priorly known to medical ethics from past consultations when patient actively stopped nutrition    S/p prolonged complex hospitalization from 6/14-11/16/2021 initially admitted with abdominal pain complicated by GIB, respiratory failure s/p trach, multiple infections, s/p cholecystotomy tube and SMA stent 10/2021, ultimately discharged to [facility] rehab and then returned to [facility] for trach decannulation 12/2. The patient had a recent admission with COVID 19 reinfection and distributive shock. That admission he had blood culture positive for serratia marcescens (discharged on levaquin).  He was treated with MAB, remdesivir, and steroids. He had recurrent VT and was started mexiletine.     This admission BIBEMS Now from [facility] ED with leukocytosis and AMS was treated for UTI. Initially required bipap but was weaned off here. Labs concerning here for WBC 26, hemoglobin of 7s then 6.7, creatinine of 1.3. His maps were initially in the 50s. Physical exam showing pus from stoma, sat of central access of 81.8, and tachycardia are all concerning for septic shock picture. He is s/p 1L fluids, 1uPRBCs, was placed on vaso. Patient has known episodes of VT and had recurrent NSVT/VT when he first arrived started on lidocaine infusion with improvement. He was found to be bacteremic with recurrent serratia, on IV abx possibly r/t LVAD infection vs splenic abscess noted on CT 1/19. Afebrile since 3/1. Vasopressin weaned off 3/4, started on midodrine which has now been off since 3/8. LVAD without s/s of pump malfunction. Currently he opens his eyes however remains nonverbal and not following commands. His mental status is still not back to baseline. His VT has now subsided, last episode was noted on 3/12.  His hyponatremia and hyperkalemia are overall improving after receiving the following treatment insulin/D50 and lokelma.  Recently his hemoglobin has continued to drop, will be transfused with 1 uPRBC on 3/16. Overall fairly stable from HF perspective. 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 After family discussion on 3/17, family wishes to continues course of treatment and are wanting to proceed with PEG placement however after further discussion with GI and IR patient is not a candidate.     Palliative care on consult. Heart failure team on consult. Patient without candidacy for transplant and is actively dying. Ethics is asked to way in on scarce resource of blood transfusion as despite multiple transfusions patient profusely demonstrating loss and no utility being maintained. Family meetings have been made with regular updates of prognosis.    Prognosis Estimate (survival in hrs, days, wks, mos, yrs): poor  Patient Decision-Making Capacity:  Has capacity Lacks capacity  Patient Aware of:  Diagnosis:   Yes    No   Unknown    Prognosis:   Yes    No   Unknown       Name of medical decision-maker should patient lack capacity: Cristina Rudolph, [phone]  Other stakeholders: Celestina, Niece; Miller, Nephew   Other Decision-Maker (i.e., HCA or Surrogate) Aware of:  Diagnosis: Yes   No  Unknown      Prognosis:   Yes    No    Unknown   Evidence of Patient’s Preference of Life-Sustaining Treatment (Written or Oral): none  Resuscitation status:   DNR:  Yes   No      DNI: Yes   No   Discussion:  Clarified the patient's current prognosis and trajectory and domains of medical decision making     Bioethics analysis:   The central ethical issue that exists in this case is (A) the patient’s autonomy versus (B) the providers’ desire for beneficence and non-maleficence. The ethical analysis faces the substantial difficulty of balancing 3 bioethical operational principles in "managing" the patient’s nutritional status: (1) Beneficence, which leads his physicians to recommend enteral nutrition to optimize patient’s clinical status; (2) Respect for Autonomy, the patient’s own right to self-determination in matters related to the integrity of his body and self; and (3) Non-Maleficence, the physician’s duty to "do no harm" by an intervention that has distinct risks, and (4) Social Justice- which is fairness to patient's viewed in the context of community and at times requires consideration when contemplating scarce resources such as transplantation, blood products and rare health resources.    In this case, Mr. Quispe has an LVAD being maintained without AC and continues to demonstrate hemoglobin of 8.0 but has had a terminal tortuous prolonged hospitalization. The ethics consults seeks to weigh in on providing feasible and appropriate ethical care in the face of utilizing a limited scare resource transfusion that has no expectation of benefit to treat end stage heart failure. The physicians are applauded for considering the blood resuscitative risks and benefits to this patient in light of his deteriorating condition. Physicians have no obligation to provide non-beneficial treatments without an expectation of benefit concordant with the goal of intervention1. Another approach is to review this patient’s prognosis – almost certain progressive continual decline in health status regardless of intervention. However it is reasonable to consider palliation and burden of suffering. In honoring the patient’s moral status, the issue remains that the patient has a poor prognosis, will progressively deteriorate but requires comfort and ease of suffering as he progresses through the dying process.   The [facility] Health Care Decision Act allows for a decisions to withhold or withdraw life-sustaining treatment only if treatment would be an extraordinary burden to the patient and an attending physician determines, with the concurrence of at least one physician who is not directly responsible for the patient's care, that to a reasonable degree of medical certainty and in accord with accepted medical standards,  the patient has an illness or injury which can be expected to cause death within six months, whether or not treatment is provided; and    (B)	the patient is permanently unconscious; or the provision of treatment would involve such pain, suffering or other burden that it would reasonably be deemed inhumane or extraordinarily burdensome under the circumstances and the patient has an irreversible or incurable condition,.   In this case, the patient’s current condition meets this standard and the life sustaining treatment may be withheld or withdrawn. However, the family has an ethical right to seek continued medical care that is medically appropriate and standard of care.    In regards to active bleeding that has failed multiple transfusion in the face of an LVAD and end stage heart disease, medical management suggests avoiding blood loss and setting an appropriate physiologic benchmark for blood transfusion     CONCLUSION: Both [place] law, FHCDA and current ethical thinking support the limitation of blood transfusion in actively dying patients  Ethics suggests  1. Use of pediatric tubes  2. Limiting the use of venipunctures to maintain and preserve current homeostasis  3. Not transfusing for hemoglobin of 6.5   4. Providing comfort and psychospiritual ease for the family as the patient is imminently dying   MORE THAN 50% OF THE TIME OF THIS CONSULTATION WAS SPENT IN COORDINATION OF CARE OF PATIENT    1 JESUS Shelby. Medical futility: a conceptual model. J. Med Ethics. 2007 February; 33(2): 71–75. doi: 10.1136/[email or web address].2006.601971. http://www.ncbi.nlm.nih.gov/pmc/articles/ECW4742199/

## 2022-03-30 NOTE — PROGRESS NOTE ADULT - ASSESSMENT
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    S/p prolonged complex hospitalization from 6/14-11/16/2021 initially admitted with abdominal pain complicated by GIB, respiratory failure s/p trach, multiple infections, s/p cholecystotomy tube and SMA stent 10/2021, ultimately discharged [email or web address] rehab and then returned to [facility] for trach decannulation 12/2. The patient had a recent admission with COVID 19 reinfection and distributive shock. That admission he had blood culture positive for serratia marcescens (discharged on levaquin).  He was treated with MAB, remdesivir, and steroids. He had recurrent VT and was started mexiletine.     Now coming in from [facility] ED with leukocytosis and AMS was treated for UTI. Initially required bipap but was weaned off here. Labs concerning here for WBC 26, hemoglobin of 7s then 6.7, creatinine of 1.3. His maps were initially in the 50s. Physical exam showing pus from stoma, sat of central access of 81.8, and tachycardia are all concerning for septic shock picture. He is s/p 1L fluids, 1uPRBCs, was placed on vaso. Patient has known episodes of VT and had recurrent NSVT/VT when he first arrived started on lidocaine infusion with improvement. Of note he was previously on mexiletine 150 TID outpatient, metoprolol ER 25 qAM and 50 qPM. Previous history of thyrotoxicosis on amiodarone.    He was found to be bacteremic with recurrent serratia, on IV abx possibly r/t LVAD infection vs splenic abscess noted on CT 1/19. Afebrile since 3/1. Vasopressin weaned off 3/4, started on midodrine which has now been off since 3/8. LVAD without s/s of pump malfunction. Currently he opens his eyes however remains nonverbal and not following commands. His mental status is still not back to baseline. His VT has now subsided, last episode was noted on 3/12.  His hyponatremia and hyperkalemia are overall improving after receiving the following treatment insulin/D50 and lokelma.  Recently his hemoglobin has continued to drop, will be transfused with 1 uPRBC on 3/16. Overall fairly stable from HF perspective. 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 After family discussion on 3/17, family wishes to continues course of treatment and are wanting to proceed with PEG placement however after further discussion with GI and IR patient is not a candidate.

## 2022-03-30 NOTE — CONSULT NOTE ADULT - CONSULT REQUESTED BY NAME
CCU
CTICU team
Jus Jensen MD
Dr. Jensen
Primary
Aranza Bazzi, PA
CCU
Dr. Colón
HILARIA
Med
primary team
Dr. Jensen
primary
RN
Ky Guerrero

## 2022-03-30 NOTE — PROGRESS NOTE ADULT - SUBJECTIVE AND OBJECTIVE BOX
INFECTIOUS DISEASES FOLLOW UP-- Anitra Prater  [phone]    This is a follow up note for this  66yMale with  Sepsis        ROS:  CONSTITUTIONAL:  No fever, good appetite  CARDIOVASCULAR:  No chest pain or palpitations  RESPIRATORY:  No dyspnea  GASTROINTESTINAL:  No nausea, vomiting, diarrhea, or abdominal pain  GENITOURINARY:  No dysuria  NEUROLOGIC:  No headache,     Allergies    Amiodarone Hydrochloride (Other (Severe))    Intolerances        ANTIBIOTICS/RELEVANT:  antimicrobials  cefepime   IVPB 1000 milliGRAM(s) IV Intermittent every 12 hours    immunologic:    OTHER:  ascorbic acid 500 milliGRAM(s) Oral daily  chlorhexidine 2% Cloths 1 Application(s) Topical <User Schedule>  cholecalciferol 2000 Unit(s) Oral daily  HYDROmorphone  Injectable 0.2 milliGRAM(s) IV Push every 2 hours PRN  insulin lispro (ADMELOG) corrective regimen sliding scale   SubCutaneous every 6 hours  metoprolol tartrate 50 milliGRAM(s) Oral three times a day  mexiletine 150 milliGRAM(s) Oral every 8 hours  multivitamin 1 Tablet(s) Oral daily  pantoprazole  Injectable 40 milliGRAM(s) IV Push two times a day  polyethylene glycol 3350 17 Gram(s) Oral daily PRN  senna 2 Tablet(s) Oral at bedtime  sertraline 100 milliGRAM(s) Oral daily  sodium chloride 0.9% Bolus 250 milliLiter(s) IV Bolus once  thiamine 100 milliGRAM(s) Oral daily  traMADol 25 milliGRAM(s) Oral once      Objective:  Vital Signs Last 24 Hrs  T(C): 36.9 (30 Mar 2022 16:04), Max: 37.1 (30 Mar 2022 08:30)  T(F): 98.4 (30 Mar 2022 16:04), Max: 98.7 (30 Mar 2022 08:30)  HR: 106 (30 Mar 2022 16:04) (94 - 110)  BP: 95/56 (30 Mar 2022 12:04) (95/56 - 98/54)  BP(mean): 76 (30 Mar 2022 16:00) (68 - 76)  RR: 18 (30 Mar 2022 16:04) (16 - 20)  SpO2: 100% (30 Mar 2022 16:04) (98% - 100%)    PHYSICAL EXAM:  Constitutional:no acute distress  Eyes:ALONSO, EOMI  Ear/Nose/Throat: no oral lesions, 	  Respiratory: clear BL  Cardiovascular: S1S2  Gastrointestinal:soft, (+) BS, no tenderness  Extremities:no e/e/c  No Lymphadenopathy  IV sites not inflammed.    LABS:                        7.1    10.82 )-----------( 177      ( 29 Mar 2022 05:13 )             22.5     03-29    136  |  100  |  58<H>  ----------------------------<  165<H>  4.6   |  26  |  0.91    Ca    9.6      29 Mar 2022 05:13            MICROBIOLOGY:            RECENT CULTURES:      RADIOLOGY & ADDITIONAL STUDIES: INFECTIOUS DISEASES FOLLOW UP-- Anitra Prater  [phone]    This is a follow up note for this  66yMale with  Sepsis        ROS:  CONSTITUTIONAL:  No significant change    Allergies    Amiodarone Hydrochloride (Other (Severe))    Intolerances        ANTIBIOTICS/RELEVANT:  antimicrobials  cefepime   IVPB 1000 milliGRAM(s) IV Intermittent every 12 hours    immunologic:    OTHER:  ascorbic acid 500 milliGRAM(s) Oral daily  chlorhexidine 2% Cloths 1 Application(s) Topical <User Schedule>  cholecalciferol 2000 Unit(s) Oral daily  HYDROmorphone  Injectable 0.2 milliGRAM(s) IV Push every 2 hours PRN  insulin lispro (ADMELOG) corrective regimen sliding scale   SubCutaneous every 6 hours  metoprolol tartrate 50 milliGRAM(s) Oral three times a day  mexiletine 150 milliGRAM(s) Oral every 8 hours  multivitamin 1 Tablet(s) Oral daily  pantoprazole  Injectable 40 milliGRAM(s) IV Push two times a day  polyethylene glycol 3350 17 Gram(s) Oral daily PRN  senna 2 Tablet(s) Oral at bedtime  sertraline 100 milliGRAM(s) Oral daily  sodium chloride 0.9% Bolus 250 milliLiter(s) IV Bolus once  thiamine 100 milliGRAM(s) Oral daily  traMADol 25 milliGRAM(s) Oral once      Objective:  Vital Signs Last 24 Hrs  T(C): 36.9 (30 Mar 2022 16:04), Max: 37.1 (30 Mar 2022 08:30)  T(F): 98.4 (30 Mar 2022 16:04), Max: 98.7 (30 Mar 2022 08:30)  HR: 106 (30 Mar 2022 16:04) (94 - 110)  BP: 95/56 (30 Mar 2022 12:04) (95/56 - 98/54)  BP(mean): 76 (30 Mar 2022 16:00) (68 - 76)  RR: 18 (30 Mar 2022 16:04) (16 - 20)  SpO2: 100% (30 Mar 2022 16:04) (98% - 100%)    PHYSICAL EXAM:  Constitutional:non interactive  Eyes:ALONSO, EOMI  Ear/Nose/Throat: no oral lesions, 	  Respiratory: coarse  Cardiovascular: [id number] sounds  Gastrointestinal:soft, (+) BS, no tenderness  Extremities:no e/e/c  No Lymphadenopathy  IV sites not inflammed.    LABS:                        7.1    10.82 )-----------( 177      ( 29 Mar 2022 05:13 )             22.5     03-29    136  |  100  |  58<H>  ----------------------------<  165<H>  4.6   |  26  |  0.91    Ca    9.6      29 Mar 2022 05:13            MICROBIOLOGY:            RECENT CULTURES:      RADIOLOGY & ADDITIONAL STUDIES:

## 2022-03-31 NOTE — PROGRESS NOTE ADULT - PROBLEM SELECTOR PLAN 4
ID following, on suppressive cefepime- serratia bacteremia - source likely LVAD  Completed 7 days meropenem

## 2022-03-31 NOTE — PROGRESS NOTE ADULT - NSPROGADDITIONALINFOA_GEN_ALL_CORE
dw ACP Scarlet
dw medicine ACP, HF, ethics
prognosis guarded.  await further input from family and pall care  plan of care discussed with floor NP/PA
rosalia Mcpherson
complex case  GOC are clear - prolong life regardless of quality.  DNR DNI  appreciate multi-disciplinary team input.  spent 75 mins
overall prognosis poor  plan of care discussed with floor NP Jeimy and bedside RN  pall care assisting with family discussions re: feeding and GOC
patient is DNR/DNI
prognosis guarded.  f/up GI recs  await family meeting tomorrow  d/w Dr. Burgos and CLAUDIA Butcher.
 medicine ACP
 Medicine ACP
prognosis is poor  elevated risk of inpatient mortality  d/w HCP Cristina Rudolph  plan of care discussed with floor CLAUDIA Riojas and bedside RN
complex case  GOC are clear - prolong life, obtain PEG  DNR DNI  continue all other measures.  plan of care discussed with floor CLAUDIA Butcher.
d/w medicine ACP
plan of care discussed with floor ACP.  prognosis guarded
plan of care discussed with floor NP Jan

## 2022-03-31 NOTE — PROGRESS NOTE ADULT - ASSESSMENT
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 s/p HM2 LVAD in 9/2017 at  (due to severe PAD) with TV ring, multiple GI bleeds, thus maintained off AC, prior COVID-19 infection in 4/2020, recurrent syncope post LVAD s/p ILR, prolonged complex hospitalization from 6/14-11/16/2021 with GIB, respiratory failure s/p trach decanulated 12/2021, multiple infections, s/p cholecystotomy tube and SMA stent 10/2021, recurrent COVID 19 reinfection s/p MAB/remdesivir/steroids and distributive shock with serratia bacteremia, recurrent VT on mexiletine transferred from [facility] ED with metabolic encephalopathy, hypercarbic respiratory failure weaned off bipap, septic shock requiring pressors (now on midodrine) with serratia bacteremia on cefepime, intermittent h/h drop s/p total 3units prbc (last 3/6), episodes of recurrent VT s/p lidocaine gtt now on mexiletine. Receiving NGT feedings, per GI/IR too high risk for PEG placement.

## 2022-03-31 NOTE — PROGRESS NOTE ADULT - SUBJECTIVE AND OBJECTIVE BOX
Subjective: Patient seen and examined resting in bed. ON had episode of black tarry stool    Medications:  ascorbic acid 500 milliGRAM(s) Oral daily  cefepime   IVPB 1000 milliGRAM(s) IV Intermittent every 12 hours  chlorhexidine 2% Cloths 1 Application(s) Topical <User Schedule>  cholecalciferol 2000 Unit(s) Oral daily  HYDROmorphone  Injectable 0.2 milliGRAM(s) IV Push every 2 hours PRN  insulin lispro (ADMELOG) corrective regimen sliding scale   SubCutaneous every 6 hours  metoprolol tartrate 50 milliGRAM(s) Oral three times a day  mexiletine 150 milliGRAM(s) Oral every 8 hours  multivitamin 1 Tablet(s) Oral daily  pantoprazole  Injectable 40 milliGRAM(s) IV Push two times a day  polyethylene glycol 3350 17 Gram(s) Oral daily PRN  senna 2 Tablet(s) Oral at bedtime  sertraline 100 milliGRAM(s) Oral daily  sodium chloride 0.9% Bolus 250 milliLiter(s) IV Bolus once  thiamine 100 milliGRAM(s) Oral daily  traMADol 25 milliGRAM(s) Oral once    Vitals:  Vital Signs Last 24 Hours  T(C): 36.4 (22 @ 04:18), Max: 37.1 (22 @ 08:30)  HR: 114 (22 @ 04:18) (94 - 115)  BP: 95/56 (22 @ 12:04) (95/56 - 98/54)  RR: 18 (22 @ 04:18) (16 - 18)  SpO2: 100% (22 @ 04:18) (98% - 100%)    Weight in k ( @ 05:33)    I&O's Summary    30 Mar 2022 07:01  -  31 Mar 2022 07:00  --------------------------------------------------------  IN: 612 mL / OUT: 925 mL / NET: -313 mL        Physical Exam  General: resting in bed, frail  Neck: Neck supple. JVP not elevated. No masses  Chest: Clear to auscultation bilaterally  CV: +VAD hum  Abdomen: Soft, non-distended, non-tender, +kevin tube  Neurology: awake but not following commands. remains non verbal     LVAD Interrogation  VAD TYPE HM 2   Speed 9200  Flow 5.5   Power 5.7  PI 5.4  Assessment of driveline exit site: driveline dressing c/d/i  Programming changes: no changes made    Labs:                        6.0    9.66  )-----------( 198      ( 31 Mar 2022 01:16 )             19.6

## 2022-03-31 NOTE — PROVIDER CONTACT NOTE (CRITICAL VALUE NOTIFICATION) - RECOMMENDATIONS
Repeat CBC stat
PA aware, Type and Screen ordered, 2 1/2 units PRBC to be administered over 3 hours each
Transfuse patient if appropriate

## 2022-03-31 NOTE — PROGRESS NOTE ADULT - PROBLEM SELECTOR PLAN 9
Prognosis poor- patient is DNR/DNI  Family wanted continued treatment from meeting on 3/17 and 3/25  No further blood draws and transfusions. TF held for melena.  Pending repeat family meeting.

## 2022-03-31 NOTE — PROGRESS NOTE ADULT - PROBLEM SELECTOR PROBLEM 8
Discharge planning issues
Discharge planning issues
Hyperthyroidism
Hyperthyroidism
Discharge planning issues
Altered mental status
Altered mental status
Discharge planning issues
Altered mental status
Altered mental status
Discharge planning issues
Altered mental status
Discharge planning issues
Hyperthyroidism
Altered mental status
Hyperthyroidism
Altered mental status
Altered mental status
Hyperthyroidism
Hyperthyroidism
Altered mental status
Hyperthyroidism
Altered mental status
Hyperthyroidism
Hyperthyroidism
Altered mental status
Altered mental status
Discharge planning issues
Hyperthyroidism
Altered mental status

## 2022-03-31 NOTE — PROVIDER CONTACT NOTE (CRITICAL VALUE NOTIFICATION) - ASSESSMENT
pt AOx0, tachypneic, pt nonverbal
Patient has been the same throughout shift. Patient at baseline AOx0 but still opens eyes when talked to. PCA endorses that the patient was a hard stick. Lab recommends a repeat for possible contamination.
Pt is A+Ox1, symptomatic

## 2022-03-31 NOTE — PROVIDER CONTACT NOTE (CRITICAL VALUE NOTIFICATION) - PERSON GIVING RESULT:
Isidro Serna
Nathaniel Christianson
Tiffani Caruso/ Lab
Saran Dos Santos [facility]
Kareem

## 2022-03-31 NOTE — PROGRESS NOTE ADULT - NUTRITIONAL ASSESSMENT
This patient has been assessed with a concern for Malnutrition and has been determined to have a diagnosis/diagnoses of Severe protein-calorie malnutrition and Underweight (BMI < 19).    This patient is being managed with:   Diet NPO with Tube Feed-  Tube Feeding Modality: Nasogastric  Jevity 1.5 Tank (JEVITY1.5RTH)  Total Volume for 24 Hours (mL): 1080  Continuous  Starting Tube Feed Rate {mL per Hour}: 20  Increase Tube Feed Rate by (mL): 10     Every 4 hours  Until Goal Tube Feed Rate (mL per Hour): 45  Tube Feed Duration (in Hours): 24  Tube Feed Start Time: 17:00  Reji(7 Gm Arginine/7 Gm Glut/1.2 Gm HMB     Qty per Day:  2  No Carb Prosource TF     Qty per Day:  1  Entered: Mar 10 2022  4:46PM    
This patient has been assessed with a concern for Malnutrition and has been determined to have a diagnosis/diagnoses of Severe protein-calorie malnutrition and Underweight (BMI < 19).    This patient is being managed with:   Diet NPO with Tube Feed-  Tube Feeding Modality: Nasogastric  Jevity 1.2 Tank (JEVITY1.2RTH)  Total Volume for 24 Hours (mL): 1320  Continuous  Starting Tube Feed Rate {mL per Hour}: 10  Increase Tube Feed Rate by (mL): 10     Every 4 hours  Until Goal Tube Feed Rate (mL per Hour): 55  Tube Feed Duration (in Hours): 24  Tube Feed Start Time: 13:45  Entered: Mar  3 2022  8:30AM    
This patient has been assessed with a concern for Malnutrition and has been determined to have a diagnosis/diagnoses of Severe protein-calorie malnutrition and Underweight (BMI < 19).    This patient is being managed with:   Diet NPO with Tube Feed-  Tube Feeding Modality: Nasogastric  Jevity 1.2 Tank (JEVITY1.2RTH)  Total Volume for 24 Hours (mL): 1320  Continuous  Starting Tube Feed Rate {mL per Hour}: 10  Increase Tube Feed Rate by (mL): 10     Every 4 hours  Until Goal Tube Feed Rate (mL per Hour): 55  Tube Feed Duration (in Hours): 24  Tube Feed Start Time: 13:45  Entered: Mar  3 2022  8:30AM    
This patient has been assessed with a concern for Malnutrition and has been determined to have a diagnosis/diagnoses of Severe protein-calorie malnutrition and Underweight (BMI < 19).    This patient is being managed with:   Diet NPO-  Except Medications  Entered: Mar 17 2022  8:33PM    
This patient has been assessed with a concern for Malnutrition and has been determined to have a diagnosis/diagnoses of Severe protein-calorie malnutrition and Underweight (BMI < 19).    This patient is being managed with:   Diet NPO with Tube Feed-  Tube Feeding Modality: Nasogastric  Jevity 1.5 Tank (JEVITY1.5RTH)  Total Volume for 24 Hours (mL): 1080  Continuous  Starting Tube Feed Rate {mL per Hour}: 20  Increase Tube Feed Rate by (mL): 10     Every 4 hours  Until Goal Tube Feed Rate (mL per Hour): 45  Tube Feed Duration (in Hours): 24  Tube Feed Start Time: 17:00  Reji(7 Gm Arginine/7 Gm Glut/1.2 Gm HMB     Qty per Day:  2  No Carb Prosource TF     Qty per Day:  1  Entered: Mar 10 2022  4:46PM    
Mild Chronic asymptomatic Hyponatremia- ADH mediated  Pt has had a history of chronic hyponatremia in the past.   Most recently his Na corrected to 135 on 3/8, trended down to 127.   Pt recieved multiple rounds of IVF since admission. On abx with D5.  Appears euvolemic on exam.   Last Posm 281 & Uosm 585 in december 2021.   repeat  Ludy 69, AMvw668, SOsm 274  Continue fluid restrict to <1L/day for now.   Increase PO solute intake.   Continue sodium chloride tabs 1 gm tid. decrease dose of torsemide to 5mg PO daily  Monitor SNa daily. SNa 136 today  May continue with zoloft  Avoid hypotonic fluids & NS (unless for resuscitation) for now    Will sign off. Please call us back if needed
This patient has been assessed with a concern for Malnutrition and has been determined to have a diagnosis/diagnoses of Severe protein-calorie malnutrition and Underweight (BMI < 19).    This patient is being managed with:   Diet NPO with Tube Feed-  Tube Feeding Modality: Nasogastric  Jevity 1.2 Tank (JEVITY1.2RTH)  Total Volume for 24 Hours (mL): 1320  Continuous  Starting Tube Feed Rate {mL per Hour}: 10  Increase Tube Feed Rate by (mL): 10     Every 4 hours  Until Goal Tube Feed Rate (mL per Hour): 55  Tube Feed Duration (in Hours): 24  Tube Feed Start Time: 13:45  Entered: Mar  3 2022  8:30AM    
This patient has been assessed with a concern for Malnutrition and has been determined to have a diagnosis/diagnoses of Severe protein-calorie malnutrition and Underweight (BMI < 19).    This patient is being managed with:   Diet NPO with Tube Feed-  Tube Feeding Modality: Nasogastric  Jevity 1.2 Tank (JEVITY1.2RTH)  Total Volume for 24 Hours (mL): 1320  Continuous  Starting Tube Feed Rate {mL per Hour}: 10  Increase Tube Feed Rate by (mL): 10     Every 4 hours  Until Goal Tube Feed Rate (mL per Hour): 55  Tube Feed Duration (in Hours): 24  Tube Feed Start Time: 13:45  No Carb Prosource TF     Qty per Day:  2  Entered: Mar  6 2022  9:32AM    
This patient has been assessed with a concern for Malnutrition and has been determined to have a diagnosis/diagnoses of Severe protein-calorie malnutrition and Underweight (BMI < 19).    This patient is being managed with:   Diet NPO with Tube Feed-  Tube Feeding Modality: Nasogastric  Jevity 1.5 Tank (JEVITY1.5RTH)  Total Volume for 24 Hours (mL): 1080  Continuous  Starting Tube Feed Rate {mL per Hour}: 20  Increase Tube Feed Rate by (mL): 10     Every 4 hours  Until Goal Tube Feed Rate (mL per Hour): 45  Tube Feed Duration (in Hours): 24  Tube Feed Start Time: 17:00  Reji(7 Gm Arginine/7 Gm Glut/1.2 Gm HMB     Qty per Day:  2  No Carb Prosource TF     Qty per Day:  1  Entered: Mar 20 2022  6:15PM    
This patient has been assessed with a concern for Malnutrition and has been determined to have a diagnosis/diagnoses of Severe protein-calorie malnutrition and Underweight (BMI < 19).    This patient is being managed with:   Diet NPO with Tube Feed-  Tube Feeding Modality: Nasogastric  Jevity 1.2 Tank (JEVITY1.2RTH)  Total Volume for 24 Hours (mL): 1320  Continuous  Starting Tube Feed Rate {mL per Hour}: 10  Increase Tube Feed Rate by (mL): 10     Every 4 hours  Until Goal Tube Feed Rate (mL per Hour): 55  Tube Feed Duration (in Hours): 24  Tube Feed Start Time: 13:45  Entered: Mar  3 2022  8:30AM    
This patient has been assessed with a concern for Malnutrition and has been determined to have a diagnosis/diagnoses of Severe protein-calorie malnutrition and Underweight (BMI < 19).    This patient is being managed with:   Diet NPO with Tube Feed-  Tube Feeding Modality: Nasogastric  Jevity 1.2 Tank (JEVITY1.2RTH)  Total Volume for 24 Hours (mL): 1320  Continuous  Starting Tube Feed Rate {mL per Hour}: 10  Increase Tube Feed Rate by (mL): 10     Every 4 hours  Until Goal Tube Feed Rate (mL per Hour): 55  Tube Feed Duration (in Hours): 24  Tube Feed Start Time: 13:45  No Carb Prosource TF     Qty per Day:  2  Entered: Mar  6 2022  9:32AM    
This patient has been assessed with a concern for Malnutrition and has been determined to have a diagnosis/diagnoses of Severe protein-calorie malnutrition and Underweight (BMI < 19).    This patient is being managed with:   Diet NPO with Tube Feed-  Tube Feeding Modality: Nasogastric  Jevity 1.2 Tank (JEVITY1.2RTH)  Total Volume for 24 Hours (mL): 960  Continuous  Starting Tube Feed Rate {mL per Hour}: 10  Increase Tube Feed Rate by (mL): 10     Every 4 hours  Until Goal Tube Feed Rate (mL per Hour): 40  Tube Feed Duration (in Hours): 24  Tube Feed Start Time: 13:45  Entered: Mar  1 2022  1:43PM    
This patient has been assessed with a concern for Malnutrition and has been determined to have a diagnosis/diagnoses of Severe protein-calorie malnutrition and Underweight (BMI < 19).    This patient is being managed with:   Diet NPO with Tube Feed-  Tube Feeding Modality: Nasogastric  Jevity 1.5 Tank (JEVITY1.5RTH)  Total Volume for 24 Hours (mL): 1080  Continuous  Starting Tube Feed Rate {mL per Hour}: 20  Increase Tube Feed Rate by (mL): 10     Every 4 hours  Until Goal Tube Feed Rate (mL per Hour): 45  Tube Feed Duration (in Hours): 24  Tube Feed Start Time: 17:00  Reji(7 Gm Arginine/7 Gm Glut/1.2 Gm HMB     Qty per Day:  2  No Carb Prosource TF     Qty per Day:  1  Entered: Mar 20 2022  6:15PM    
This patient has been assessed with a concern for Malnutrition and has been determined to have a diagnosis/diagnoses of Severe protein-calorie malnutrition and Underweight (BMI < 19).    This patient is being managed with:   Diet NPO with Tube Feed-  Tube Feeding Modality: Nasogastric  Jevity 1.2 Tank (JEVITY1.2RTH)  Total Volume for 24 Hours (mL): 1320  Continuous  Starting Tube Feed Rate {mL per Hour}: 10  Increase Tube Feed Rate by (mL): 10     Every 4 hours  Until Goal Tube Feed Rate (mL per Hour): 55  Tube Feed Duration (in Hours): 24  Tube Feed Start Time: 13:45  Entered: Mar  3 2022  8:30AM    
This patient has been assessed with a concern for Malnutrition and has been determined to have a diagnosis/diagnoses of Severe protein-calorie malnutrition and Underweight (BMI < 19).    This patient is being managed with:   Diet NPO with Tube Feed-  Tube Feeding Modality: Nasogastric  Jevity 1.2 Tank (JEVITY1.2RTH)  Total Volume for 24 Hours (mL): 1320  Continuous  Starting Tube Feed Rate {mL per Hour}: 10  Increase Tube Feed Rate by (mL): 10     Every 4 hours  Until Goal Tube Feed Rate (mL per Hour): 55  Tube Feed Duration (in Hours): 24  Tube Feed Start Time: 13:45  No Carb Prosource TF     Qty per Day:  2  Entered: Mar  6 2022  9:32AM    
This patient has been assessed with a concern for Malnutrition and has been determined to have a diagnosis/diagnoses of Severe protein-calorie malnutrition and Underweight (BMI < 19).    This patient is being managed with:   Diet NPO with Tube Feed-  Tube Feeding Modality: Nasogastric  Jevity 1.5 Tank (JEVITY1.5RTH)  Total Volume for 24 Hours (mL): 1080  Continuous  Starting Tube Feed Rate {mL per Hour}: 20  Increase Tube Feed Rate by (mL): 10     Every 4 hours  Until Goal Tube Feed Rate (mL per Hour): 45  Tube Feed Duration (in Hours): 24  Tube Feed Start Time: 17:00  Reji(7 Gm Arginine/7 Gm Glut/1.2 Gm HMB     Qty per Day:  2  No Carb Prosource TF     Qty per Day:  1  Entered: Mar 20 2022  6:15PM    
This patient has been assessed with a concern for Malnutrition and has been determined to have a diagnosis/diagnoses of Severe protein-calorie malnutrition and Underweight (BMI < 19).    This patient is being managed with:   Diet NPO with Tube Feed-  Tube Feeding Modality: Nasogastric  Jevity 1.5 Tank (JEVITY1.5RTH)  Total Volume for 24 Hours (mL): 1080  Continuous  Starting Tube Feed Rate {mL per Hour}: 20  Increase Tube Feed Rate by (mL): 10     Every 4 hours  Until Goal Tube Feed Rate (mL per Hour): 45  Tube Feed Duration (in Hours): 24  Tube Feed Start Time: 17:00  Reji(7 Gm Arginine/7 Gm Glut/1.2 Gm HMB     Qty per Day:  2  No Carb Prosource TF     Qty per Day:  1  Entered: Mar 20 2022  6:15PM    
This patient has been assessed with a concern for Malnutrition and has been determined to have a diagnosis/diagnoses of Severe protein-calorie malnutrition and Underweight (BMI < 19).    This patient is being managed with:   Diet NPO-  Except Medications  Entered: Mar 17 2022  8:33PM    
This patient has been assessed with a concern for Malnutrition and has been determined to have a diagnosis/diagnoses of Severe protein-calorie malnutrition and Underweight (BMI < 19).    This patient is being managed with:   Diet NPO with Tube Feed-  Tube Feeding Modality: Nasogastric  Jevity 1.5 Tank (JEVITY1.5RTH)  Total Volume for 24 Hours (mL): 1080  Continuous  Starting Tube Feed Rate {mL per Hour}: 20  Increase Tube Feed Rate by (mL): 10     Every 4 hours  Until Goal Tube Feed Rate (mL per Hour): 45  Tube Feed Duration (in Hours): 24  Tube Feed Start Time: 17:00  Reji(7 Gm Arginine/7 Gm Glut/1.2 Gm HMB     Qty per Day:  2  No Carb Prosource TF     Qty per Day:  1  Entered: Mar 10 2022  4:46PM    
This patient has been assessed with a concern for Malnutrition and has been determined to have a diagnosis/diagnoses of Severe protein-calorie malnutrition and Underweight (BMI < 19).    This patient is being managed with:   Diet NPO with Tube Feed-  Tube Feeding Modality: Nasogastric  Jevity 1.5 Tank (JEVITY1.5RTH)  Total Volume for 24 Hours (mL): 1080  Continuous  Starting Tube Feed Rate {mL per Hour}: 20  Increase Tube Feed Rate by (mL): 10     Every 4 hours  Until Goal Tube Feed Rate (mL per Hour): 45  Tube Feed Duration (in Hours): 24  Tube Feed Start Time: 17:00  Reji(7 Gm Arginine/7 Gm Glut/1.2 Gm HMB     Qty per Day:  2  No Carb Prosource TF     Qty per Day:  1  Entered: Mar 20 2022  6:15PM    
This patient has been assessed with a concern for Malnutrition and has been determined to have a diagnosis/diagnoses of Severe protein-calorie malnutrition and Underweight (BMI < 19).    This patient is being managed with:   Diet NPO with Tube Feed-  Tube Feeding Modality: Nasogastric  Jevity 1.5 Tank (JEVITY1.5RTH)  Total Volume for 24 Hours (mL): 1080  Continuous  Starting Tube Feed Rate {mL per Hour}: 20  Increase Tube Feed Rate by (mL): 10     Every 4 hours  Until Goal Tube Feed Rate (mL per Hour): 45  Tube Feed Duration (in Hours): 24  Tube Feed Start Time: 17:00  Reji(7 Gm Arginine/7 Gm Glut/1.2 Gm HMB     Qty per Day:  2  No Carb Prosource TF     Qty per Day:  1  Entered: Mar 10 2022  4:46PM    
This patient has been assessed with a concern for Malnutrition and has been determined to have a diagnosis/diagnoses of Severe protein-calorie malnutrition and Underweight (BMI < 19).    This patient is being managed with:   Diet NPO with Tube Feed-  Tube Feeding Modality: Nasogastric  Jevity 1.5 Tank (JEVITY1.5RTH)  Total Volume for 24 Hours (mL): 1080  Continuous  Starting Tube Feed Rate {mL per Hour}: 20  Increase Tube Feed Rate by (mL): 10     Every 4 hours  Until Goal Tube Feed Rate (mL per Hour): 45  Tube Feed Duration (in Hours): 24  Tube Feed Start Time: 17:00  Reji(7 Gm Arginine/7 Gm Glut/1.2 Gm HMB     Qty per Day:  2  No Carb Prosource TF     Qty per Day:  1  Entered: Mar 20 2022  6:15PM    
This patient has been assessed with a concern for Malnutrition and has been determined to have a diagnosis/diagnoses of Severe protein-calorie malnutrition and Underweight (BMI < 19).    This patient is being managed with:   Diet NPO with Tube Feed-  Tube Feeding Modality: Nasogastric  Jevity 1.5 Tank (JEVITY1.5RTH)  Total Volume for 24 Hours (mL): 1080  Continuous  Starting Tube Feed Rate {mL per Hour}: 20  Increase Tube Feed Rate by (mL): 10     Every 4 hours  Until Goal Tube Feed Rate (mL per Hour): 45  Tube Feed Duration (in Hours): 24  Tube Feed Start Time: 17:00  Reji(7 Gm Arginine/7 Gm Glut/1.2 Gm HMB     Qty per Day:  2  No Carb Prosource TF     Qty per Day:  1  Entered: Mar 10 2022  4:46PM    
This patient has been assessed with a concern for Malnutrition and has been determined to have a diagnosis/diagnoses of Severe protein-calorie malnutrition and Underweight (BMI < 19).    This patient is being managed with:   Diet NPO with Tube Feed-  Tube Feeding Modality: Nasogastric  Jevity 1.2 Tank (JEVITY1.2RTH)  Total Volume for 24 Hours (mL): 1320  Continuous  Starting Tube Feed Rate {mL per Hour}: 10  Increase Tube Feed Rate by (mL): 10     Every 4 hours  Until Goal Tube Feed Rate (mL per Hour): 55  Tube Feed Duration (in Hours): 24  Tube Feed Start Time: 13:45  No Carb Prosource TF     Qty per Day:  2  Entered: Mar  6 2022  9:32AM    
This patient has been assessed with a concern for Malnutrition and has been determined to have a diagnosis/diagnoses of Severe protein-calorie malnutrition and Underweight (BMI < 19).    This patient is being managed with:   Diet NPO with Tube Feed-  Tube Feeding Modality: Nasogastric  Jevity 1.2 Tank (JEVITY1.2RTH)  Total Volume for 24 Hours (mL): 1320  Continuous  Starting Tube Feed Rate {mL per Hour}: 10  Increase Tube Feed Rate by (mL): 10     Every 4 hours  Until Goal Tube Feed Rate (mL per Hour): 55  Tube Feed Duration (in Hours): 24  Tube Feed Start Time: 13:45  No Carb Prosource TF     Qty per Day:  2  Entered: Mar  6 2022  9:32AM    
This patient has been assessed with a concern for Malnutrition and has been determined to have a diagnosis/diagnoses of Severe protein-calorie malnutrition and Underweight (BMI < 19).    This patient is being managed with:   Diet NPO with Tube Feed-  Tube Feeding Modality: Nasogastric  Jevity 1.5 Tank (JEVITY1.5RTH)  Total Volume for 24 Hours (mL): 1080  Continuous  Starting Tube Feed Rate {mL per Hour}: 20  Increase Tube Feed Rate by (mL): 10     Every 4 hours  Until Goal Tube Feed Rate (mL per Hour): 45  Tube Feed Duration (in Hours): 24  Tube Feed Start Time: 17:00  Reji(7 Gm Arginine/7 Gm Glut/1.2 Gm HMB     Qty per Day:  2  No Carb Prosource TF     Qty per Day:  1  Entered: Mar 20 2022  6:15PM    
This patient has been assessed with a concern for Malnutrition and has been determined to have a diagnosis/diagnoses of Severe protein-calorie malnutrition and Underweight (BMI < 19).    This patient is being managed with:   Diet NPO with Tube Feed-  Tube Feeding Modality: Nasogastric  Jevity 1.5 Tank (JEVITY1.5RTH)  Total Volume for 24 Hours (mL): 1080  Continuous  Starting Tube Feed Rate {mL per Hour}: 20  Increase Tube Feed Rate by (mL): 10     Every 4 hours  Until Goal Tube Feed Rate (mL per Hour): 45  Tube Feed Duration (in Hours): 24  Tube Feed Start Time: 17:00  Reji(7 Gm Arginine/7 Gm Glut/1.2 Gm HMB     Qty per Day:  2  No Carb Prosource TF     Qty per Day:  1  Entered: Mar 20 2022  6:15PM    
This patient has been assessed with a concern for Malnutrition and has been determined to have a diagnosis/diagnoses of Severe protein-calorie malnutrition and Underweight (BMI < 19).    This patient is being managed with:   Diet NPO with Tube Feed-  Tube Feeding Modality: Nasogastric  Jevity 1.5 Tank (JEVITY1.5RTH)  Total Volume for 24 Hours (mL): 1080  Continuous  Starting Tube Feed Rate {mL per Hour}: 20  Increase Tube Feed Rate by (mL): 10     Every 4 hours  Until Goal Tube Feed Rate (mL per Hour): 45  Tube Feed Duration (in Hours): 24  Tube Feed Start Time: 17:00  Reji(7 Gm Arginine/7 Gm Glut/1.2 Gm HMB     Qty per Day:  2  No Carb Prosource TF     Qty per Day:  1  Entered: Mar 10 2022  4:46PM    
This patient has been assessed with a concern for Malnutrition and has been determined to have a diagnosis/diagnoses of Severe protein-calorie malnutrition and Underweight (BMI < 19).    This patient is being managed with:   Diet NPO with Tube Feed-  Tube Feeding Modality: Nasogastric  Jevity 1.5 Tank (JEVITY1.5RTH)  Total Volume for 24 Hours (mL): 1080  Continuous  Starting Tube Feed Rate {mL per Hour}: 20  Increase Tube Feed Rate by (mL): 10     Every 4 hours  Until Goal Tube Feed Rate (mL per Hour): 45  Tube Feed Duration (in Hours): 24  Tube Feed Start Time: 17:00  Reji(7 Gm Arginine/7 Gm Glut/1.2 Gm HMB     Qty per Day:  2  No Carb Prosource TF     Qty per Day:  1  Entered: Mar 20 2022  6:15PM    
This patient has been assessed with a concern for Malnutrition and has been determined to have a diagnosis/diagnoses of Severe protein-calorie malnutrition and Underweight (BMI < 19).    This patient is being managed with:   Diet NPO-  Except Medications  Entered: Mar 17 2022  8:33PM    
This patient has been assessed with a concern for Malnutrition and has been determined to have a diagnosis/diagnoses of Severe protein-calorie malnutrition and Underweight (BMI < 19).    This patient is being managed with:   Diet NPO with Tube Feed-  Tube Feeding Modality: Nasogastric  Jevity 1.5 Tank (JEVITY1.5RTH)  Total Volume for 24 Hours (mL): 1080  Continuous  Starting Tube Feed Rate {mL per Hour}: 20  Increase Tube Feed Rate by (mL): 10     Every 4 hours  Until Goal Tube Feed Rate (mL per Hour): 45  Tube Feed Duration (in Hours): 24  Tube Feed Start Time: 17:00  Reji(7 Gm Arginine/7 Gm Glut/1.2 Gm HMB     Qty per Day:  2  No Carb Prosource TF     Qty per Day:  1  Entered: Mar 20 2022  6:15PM    
This patient has been assessed with a concern for Malnutrition and has been determined to have a diagnosis/diagnoses of Severe protein-calorie malnutrition and Underweight (BMI < 19).    This patient is being managed with:   Diet NPO with Tube Feed-  Tube Feeding Modality: Nasogastric  Jevity 1.5 Tank (JEVITY1.5RTH)  Total Volume for 24 Hours (mL): 1080  Continuous  Starting Tube Feed Rate {mL per Hour}: 20  Increase Tube Feed Rate by (mL): 10     Every 4 hours  Until Goal Tube Feed Rate (mL per Hour): 45  Tube Feed Duration (in Hours): 24  Tube Feed Start Time: 17:00  Reji(7 Gm Arginine/7 Gm Glut/1.2 Gm HMB     Qty per Day:  2  No Carb Prosource TF     Qty per Day:  1  Entered: Mar 20 2022  6:15PM

## 2022-03-31 NOTE — PROGRESS NOTE ADULT - PROBLEM SELECTOR PLAN 8
- since being admitted he has opened his eyes however remains nonverbal and not following commands  - head CT has been unremarkable  - psych/neuro input appreciated
- since being admitted he has opened his eyes however remains nonverbal and not following commands  - head CT has been unremarkable  - please consult psych to discuss if there are medications that may be contributing to this state
Prognosis poor- patient is DNR/DNI  Family wanted PEG and continued treatment from meeting on 3/17  LVAD team arranging f/u family meeting to readdress plan/goals of care
- since being admitted he has opened his eyes however remains nonverbal and not following commands  - head CT has been unremarkable  - psych/neuro input appreciated
previous history of thyrotoxicosis on amiodarone now on methimazole.  TSH elevated  appreciate ENDO recs - monitor off methimazole and repeat thyroid tests next week.
Previous history of thyrotoxicosis on methimazole.  TSH elevated  Monitoring off methimazole, repeat T3 and T4 low on 3/22
Prognosis poor- patient is DNR/DNI  Family wants PEG and continued treatment at this time
previous history of thyrotoxicosis on amiodarone now on methimazole
Previous history of thyrotoxicosis on methimazole.  TSH elevated  Monitoring off methimazole, repeat T3 and T4 low on 3/22
Prognosis poor- patient is DNR/DNI  Family wants PEG and continued treatment at this time
previous history of thyrotoxicosis on amiodarone now on methimazole.  TSH elevated  appreciate ENDO recs - monitor off methimazole and repeat thyroid tests 3/22
- since being admitted he has opened his eyes however remains nonverbal and not following commands  - head CT has been unremarkable  - psych/neuro input appreciated
- since being admitted he has opened his eyes however remains nonverbal and not following commands  - head CT has been unremarkable  - psych/neuro input appreciated
previous history of thyrotoxicosis on amiodarone now on methimazole
- since being admitted he has opened his eyes however remains nonverbal and not following commands  - head CT has been unremarkable  - psych/neuro input appreciated
- since being admitted he has opened his eyes however remains nonverbal and not following commands  - head CT has been unremarkable  - psych/neuro input appreciated
- since being admitted he has opened his eyes however remains nonverbal and not following commands  - head CT has been unremarkable  - psych following
previous history of thyrotoxicosis on amiodarone now on methimazole.  TSH elevated  await ENDO marcus
- since being admitted he has opened his eyes however remains nonverbal and not following commands  - head CT has been unremarkable  - psych/neuro input appreciated
- since being admitted he has opened his eyes however remains nonverbal and not following commands  - head CT has been unremarkable  - psych/neuro input appreciated
previous history of thyrotoxicosis on amiodarone now on methimazole.  TSH elevated  f/up ENDO recs
- since being admitted he has opened his eyes however remains nonverbal and not following commands  - head CT has been unremarkable  - psych/neuro input appreciated
Previous history of thyrotoxicosis on methimazole.  TSH elevated  Monitoring off methimazole, repeat T3 and T4 low on 3/22
- since being admitted he has opened his eyes however remains nonverbal and not following commands  - head CT has been unremarkable  - psych/neuro input appreciated
- since being admitted he has opened his eyes however remains nonverbal and not following commands  - head CT has been unremarkable  - psych/neuro input appreciated
Previous history of thyrotoxicosis on methimazole.  TSH elevated  Monitoring off methimazole, repeat T3 and T4 low on 3/22
previous history of thyrotoxicosis on amiodarone now on methimazole
Prognosis poor- patient is DNR/DNI  Family wants PEG and continued treatment at this time
previous history of thyrotoxicosis on amiodarone now on methimazole.  TSH elevated  appreciate ENDO recs - monitor off methimazole and repeat thyroid tests 3/22
Prognosis poor- patient is DNR/DNI  Family wants PEG and continued treatment at this time
Prognosis poor- patient is DNR/DNI  Family wanted PEG and continued treatment from meeting on 3/17 and 3/25
- since being admitted he has opened his eyes however remains nonverbal and not following commands  - head CT has been unremarkable  - please consult psych to discuss if there are medications that may be contributing to this state
Prognosis poor- patient is DNR/DNI  Family wants PEG and continued treatment at this time
- since being admitted he has opened his eyes however remains nonverbal and not following commands  - head CT has been unremarkable  - psych/neuro input appreciated
Prognosis poor- patient is DNR/DNI  Family wanted PEG and continued treatment from meeting on 3/17  F/u family meeting today to readdress plan/goals of care
Prognosis poor- patient is DNR/DNI  Family wanted continued treatment from meeting on 3/17 and 3/25

## 2022-03-31 NOTE — PROGRESS NOTE ADULT - PROBLEM SELECTOR PLAN 9
- currently being fed via keotube  - at this time patient is unable to tolerate PO diet. Family wishes to proceed with PEG placement.   - GI following, will need to determine date of PEG placement (of note will need to be booked with cardiac anesthesia)  - As stated above GI and IR teams consider patient is too high risk.

## 2022-03-31 NOTE — PROGRESS NOTE ADULT - PROBLEM SELECTOR PROBLEM 9
Dysphagia
Discharge planning issues
Dysphagia
Dysphagia
Discharge planning issues

## 2022-03-31 NOTE — PROGRESS NOTE ADULT - SUBJECTIVE AND OBJECTIVE BOX
[facility] Division of Hospital Medicine  Venessa Dalal DO pager [phone]    Patient is a 66y old  Male who presents with a chief complaint of Septic shock (31 Mar 2022 07:18)      SUBJECTIVE / OVERNIGHT EVENTS:  Melena overnight.   Opens eyes to voice but no other movement.     MEDICATIONS  (STANDING):  ascorbic acid 500 milliGRAM(s) Oral daily  cefepime   IVPB 1000 milliGRAM(s) IV Intermittent every 12 hours  chlorhexidine 2% Cloths 1 Application(s) Topical <User Schedule>  cholecalciferol 2000 Unit(s) Oral daily  insulin lispro (ADMELOG) corrective regimen sliding scale   SubCutaneous every 6 hours  metoprolol tartrate 50 milliGRAM(s) Oral three times a day  mexiletine 150 milliGRAM(s) Oral every 8 hours  multivitamin 1 Tablet(s) Oral daily  pantoprazole  Injectable 40 milliGRAM(s) IV Push two times a day  senna 2 Tablet(s) Oral at bedtime  sertraline 100 milliGRAM(s) Oral daily  sodium chloride 0.9% Bolus 250 milliLiter(s) IV Bolus once  thiamine 100 milliGRAM(s) Oral daily  traMADol 25 milliGRAM(s) Oral once    MEDICATIONS  (PRN):  HYDROmorphone  Injectable 0.2 milliGRAM(s) IV Push every 2 hours PRN Moderate to Severe pain  polyethylene glycol 3350 17 Gram(s) Oral daily PRN Constipation      CAPILLARY BLOOD GLUCOSE      POCT Blood Glucose.: 194 mg/dL (31 Mar 2022 06:08)  POCT Blood Glucose.: 198 mg/dL (31 Mar 2022 00:23)  POCT Blood Glucose.: 165 mg/dL (30 Mar 2022 17:15)  POCT Blood Glucose.: 138 mg/dL (30 Mar 2022 12:00)    I&O's Summary    30 Mar 2022 07:01  -  31 Mar 2022 07:00  --------------------------------------------------------  IN: 612 mL / OUT: 925 mL / NET: -313 mL        PHYSICAL EXAM:  Vital Signs Last 24 Hrs  T(C): 36.6 (31 Mar 2022 08:51), Max: 36.9 (30 Mar 2022 12:04)  T(F): 97.9 (31 Mar 2022 08:51), Max: 98.4 (30 Mar 2022 12:04)  HR: 111 (31 Mar 2022 08:51) (94 - 115)  BP: 81/63 (31 Mar 2022 08:51) (81/63 - 95/56)  BP(mean): 72 (31 Mar 2022 08:58) (69 - 80)  RR: 16 (31 Mar 2022 08:51) (16 - 18)  SpO2: 100% (31 Mar 2022 08:51) (98% - 100%)    CONSTITUTIONAL: NAD, cachetic. Has NGT  RESPIRATORY: Normal respiratory effort; lungs are clear to auscultation bilaterally  CARDIOVASCULAR: Regular rate and rhythm, normal S1 and S2, no murmur/rub/gallop; No lower extremity edema  ABDOMEN: Nontender to palpation, normoactive bowel sounds, no rebound/guarding. No blood in biliary drain   MUSCULOSKELETAL: clubbing or cyanosis of digits; no joint swelling or tenderness to palpation  PSYCH: AOx0.  NEURO:  Opens eyes to voice. No other interaction    LABS:                        6.0    9.66  )-----------( 198      ( 31 Mar 2022 01:16 )             19.6                       RADIOLOGY & ADDITIONAL TESTS:  Results Reviewed:   Imaging Personally Reviewed:  Electrocardiogram Personally Reviewed:    COORDINATION OF CARE:  Care Discussed with Consultants/Other Providers [Y/N]:  Prior or Outpatient Records Reviewed [Y/N]:

## 2022-03-31 NOTE — PROGRESS NOTE ADULT - ASSESSMENT
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    S/p prolonged complex hospitalization from 6/14-11/16/2021 initially admitted with abdominal pain complicated by GIB, respiratory failure s/p trach, multiple infections, s/p cholecystotomy tube and SMA stent 10/2021, ultimately discharged to [facility] rehab and then returned to [facility] for trach decannulation 12/2. The patient had a recent admission with COVID 19 reinfection and distributive shock. That admission he had blood culture positive for serratia marcescens (discharged on levaquin).  He was treated with MAB, remdesivir, and steroids. He had recurrent VT and was started mexiletine.     Now coming in from [facility] ED with leukocytosis and AMS was treated for UTI. Initially required bipap but was weaned off here. Labs concerning here for WBC 26, hemoglobin of 7s then 6.7, creatinine of 1.3. His maps were initially in the 50s. Physical exam showing pus from stoma, sat of central access of 81.8, and tachycardia are all concerning for septic shock picture. He is s/p 1L fluids, 1uPRBCs, was placed on vaso. Patient has known episodes of VT and had recurrent NSVT/VT when he first arrived started on lidocaine infusion with improvement. Of note he was previously on mexiletine 150 TID outpatient, metoprolol ER 25 qAM and 50 qPM. Previous history of thyrotoxicosis on amiodarone.    He was found to be bacteremic with recurrent serratia, on IV abx possibly r/t LVAD infection vs splenic abscess noted on CT 1/19. Afebrile since 3/1. Vasopressin weaned off 3/4, started on midodrine which has now been off since 3/8. LVAD without s/s of pump malfunction. Currently he opens his eyes however remains nonverbal and not following commands. His mental status is still not back to baseline. His VT has now subsided, last episode was noted on 3/12.  His hyponatremia and hyperkalemia are overall improving after receiving the following treatment insulin/D50 and lokelma.  Recently his hemoglobin has continued to drop, will be transfused with 1 uPRBC on 3/16. Overall fairly stable from HF perspective. 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 After family discussion on 3/17, family wishes to continues course of treatment and are wanting to proceed with PEG placement however after further discussion with GI and IR patient is not a candidate.

## 2022-03-31 NOTE — PROVIDER CONTACT NOTE (OTHER) - ACTION/TREATMENT ORDERED:
CLAUDIA Rico made aware. As per PA, no interventions at this time. Continue to monitor pt. CLAUDIA Rico made aware. As per PA, stat CBC ordered. Continue to monitor.

## 2022-03-31 NOTE — PROVIDER CONTACT NOTE (CRITICAL VALUE NOTIFICATION) - ACTION/TREATMENT ORDERED:
Provider aware of critical value; blood transfusion to be ordered
CLAUDIA Butcher aware, blood transfusion to be ordered. Will continue to monitor
Order for Repeat CBC stat
PA aware, Type and Screen ordered, 2 1/2 units PRBC to be administered over 3 hours each

## 2022-03-31 NOTE — PROGRESS NOTE ADULT - PROBLEM SELECTOR PLAN 1
- 2/28 BCx + serratia/GNR  - WBC continues to rise despite being on IV Cefepime  - CT C/A/P 3/18 concerning for mucous plugging with possible aspiration PNA  - palliative care following, family meeting held on 3/17. At this time the family wishes to continue with course of treatment. ID has cleared patient to undergo PEG placement however as per GI and IR team patient is high risk to undergo procedure.  - Family meeting 3/26 - plan remains unchanged. His family is waiting for God to decide until when he will be with us.  - Please stop drawing labs on patient, further treatment would be futile

## 2022-03-31 NOTE — PROGRESS NOTE ADULT - SUBJECTIVE AND OBJECTIVE BOX
RICKY JOINT  MRN#: [id number]  Subjective: pulmonary progress note  : acute hypercapnic respiratory failure . Septic shock , service rendered on 2002   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 s/p HM2 LVAD in 2017 at  (due to severe PAD) with TV ring, multiple GI bleeds, thus maintained off AC, CKD 3prior COVID-19 infection in 2020, recurrent syncope post LVAD s/p ILR, s/p prolonged complex hospitalization from -2021 initially admitted with abdominal pain complicated by GIB, respiratory failure s/p trach, multiple infections, s/p cholecystotomy tube and SMA stent 10/2021, ultimately discharged to [facility] rehab and then returned to [facility] for trach decannulation , readmitted in 2022 with recurrent COVID-19 infection, initially in distributive shock. Blood cultures were also positive for serratia marcescens which he has had in the past. seen by heart failure team patient is DNR/DNI  continue on 3 liter nasal canula flat Affect , hyperkalemia lokelma , runs of V-Tach. more stable  very weak , non verbal non communicative on tube feeding , leg booting, CT scan of chest mucous bulging  on Antibiotic and incentive spirometry  , patient receiving one unit of blood transfusion , sleeping at round .RT opacity infiltrate on Antibiotic  , condition is the same ,wound care note appreciated , NG tube was replaced continue on tube feeding .wound care follow up appreciated , chest x ray no evidence of atelectasis , patient can have PEG placement , more responsive , awake  no fever no new  over night events ,no plan for PEG placement so far         (2022 22:20)    PAST MEDICAL & SURGICAL HISTORY:  CHF (congestive heart failure)    CAD (coronary artery disease)  Depression    Pleural effusion    History of 2019 novel coronavirus disease (COVID-19)  2020    Hemorrhoids    Bleeding hemorrhoids    Peripheral arterial disease    Claudication    BPH with urinary obstruction    ACC/AHA stage D systolic heart failure    Anticoagulation goal of INR 2.0 to 2.5    Falls    Clavicle fracture    CKD (chronic kidney disease), stage III    Iron deficiency anemia    H/O epistaxis    Vertigo    GI bleed    S/P TVR (tricuspid valve repair)    S/P ventricular assist device    S/P endoscopy    H/O tracheostomy        FAMILY HISTORY:    Social History:    Marital Status:  X    (   ) Single    (   )    (  )   Occupation: Retired   Lives with: (  ) alone  (  ) children  X spouse   (  ) parents  (  ) other    Substance Use (street drugs): X never used  (  ) other:  Tobacco Usage: X never smoked   (   ) former smoker   (   ) current smoker  (     ) pack year  (    ) last cigarette date  Alcohol Usage: Social         OBJECTIVE:  ICU Vital Signs Last 24 Hrs  T(C): 37.2 (01 Mar 2022 16:00), Max: 38.4 (01 Mar 2022 09:00)  T(F): 99 (01 Mar 2022 16:00), Max: 101.1 (01 Mar 2022 09:00)  HR: 109 (01 Mar 2022 18:00) (67 - 124)  BP: --  BP(mean): 86 (2022 20:00) (86 - 86)  ABP: 89/74 (01 Mar 2022 18:00) (75/63 - 160/85)  ABP(mean): 81 (01 Mar 2022 18:00) (68 - 139)  RR: 38 (01 Mar 2022 18:00) (5 - 40)  SpO2: 99% (01 Mar 2022 18:00) (94% - 100%)       @ 07: @ 07:00  --------------------------------------------------------  IN: 1060.7 mL / OUT: 1600 mL / NET: -539.3 mL     @ 07: @ 18:22  --------------------------------------------------------  IN: 677.8 mL / OUT: 455 mL / NET: 222.8 mL    PHYSICAL EXAM :Daily Height in cm: 177.8 (2022 21:20)  Elderly frail male     Daily Weight in k.7 (2022 21:20)  HEENT:     + NCAT  + EOMI  - Conjuctival edema   - Icterus   - Thrush   - ETT  - NGT/OGT  Neck:         + FROM RT IJ  JVD     - Nodes     - Masses    + Mid-line trachea   - Tracheostomy  Chest:           mild kyphosis   Lungs:          + CTA  + Rhonchi  + Rales    - Wheezing  + Decreased BS   - Dullness R L  Cardiac:       + S1 + S2    + RRR   - Irregular   - S3  - S4    - Murmurs   - Rub   - Hamman’s sign   Abdomen:    + BS     + Soft    + Non-tender  - Distended  - Organomegaly  - PEG Cholecystostomy tube in place   Extremities:   - Cyanosis U/L   - Clubbing  U/L  - LE/UE Edema   + Capillary refill    + Pulses   Neuro:        + Awake   +  Alert   - Confused   - Lethargic   - Sedated   - Generalized Weakness  Skin:        - Rashes    - Erythema   + Normal incisions   + IV sites intact + sacral and bilateral wound       HOSPITAL MEDICATIONS: All mediciations reviewed and analyzed  MEDICATIONS  (STANDING):  albumin human  5% IVPB 250 milliLiter(s) IV Intermittent once  cefepime   IVPB 2000 milliGRAM(s) IV Intermittent every 12 hours  chlorhexidine 2% Cloths 1 Application(s) Topical <User Schedule>  cholecalciferol 1000 Unit(s) Oral daily  dextrose 40% Gel 15 Gram(s) Oral once  dextrose 50% Injectable 25 Gram(s) IV Push once  gabapentin Solution 100 milliGRAM(s) Oral three times a day  glucagon  Injectable 1 milliGRAM(s) IntraMuscular once  insulin lispro (ADMELOG) corrective regimen sliding scale   SubCutaneous every 6 hours  magnesium sulfate  IVPB 1 Gram(s) IV Intermittent once  methimazole 10 milliGRAM(s) Oral daily  metoclopramide 10 milliGRAM(s) Oral four times a day  mexiletine 150 milliGRAM(s) Oral every 8 hours  mirtazapine 7.5 milliGRAM(s) Oral at bedtime  multivitamin 1 Tablet(s) Oral daily  pantoprazole  Injectable 40 milliGRAM(s) IV Push daily  potassium phosphate IVPB 15 milliMole(s) IV Intermittent once  senna 2 Tablet(s) Oral at bedtime  sertraline 100 milliGRAM(s) Oral daily  vancomycin  IVPB 750 milliGRAM(s) IV Intermittent every 24 hours  vasopressin Infusion 0.02 Unit(s)/Min (1.2 mL/Hr) IV Continuous <Continuous>    MEDICATIONS  (PRN):    LABS: All Lab data reviewed and analyzed                         6.0    9.66  )-----------( 198      ( 31 Mar 2022 01:16 )             19.6      Ca    9.6      29 Mar 2022 05:13  Mg     2.0     -      Ca    9.6      28 Mar 2022 06:43  Mg     2.0     -      Ca    9.5      27 Mar 2022 10:01  Mg     1.9     -        TPro  9.1<H>  /  Alb  3.0<L>  /  TBili  0.4  /  DBili  x   /  AST  35  /  ALT  42  /  AlkPhos  220<H>  03-23             PTT - ( 01 Mar 2022 00:08 )  PTT:25.0 sec LIVER FUNCTIONS - ( 01 Mar 2022 17:38 )  Alb: 2.9 g/dL / Pro: 7.3 g/dL / ALK PHOS: 116 U/L / ALT: 30 U/L / AST: 29 U/L / GGT: x           RADIOLOGY: - Reviewed and analyzed

## 2022-03-31 NOTE — PROGRESS NOTE ADULT - PROBLEM SELECTOR PLAN 1
Suspect secondary to infection with diffuse cerebral dysfunction.   Per family meeting 3/17 continue all medical treatment   Repeat family meeting 3/25 with no change in goals  They are reportedly aware of poor prognosis and functional quadriplegia  DNR/DNI  Patient continues to decline, repeat family meeting tentatively 4/1

## 2022-03-31 NOTE — CHART NOTE - NSCHARTNOTEFT_GEN_A_CORE
Notified by RN for 4 runs of wide complex tachycardia alternating with sinus tachycardia on telemetry. Asymptomatic.      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 s/p HM2 LVAD in 9/2017 at  (due to severe PAD) with TV ring, multiple GI bleeds, thus maintained off AC, CKD 3prior COVID-19 infection in 4/2020, recurrent syncope post LVAD s/p ILR, s/p prolonged complex hospitalization from 6/14-11/16/2021 initially admitted with abdominal pain complicated by GIB, respiratory failure s/p trach, multiple infections, s/p cholecystotomy tube and SMA stent 10/2021, ultimately discharged to [facility] rehab and then returned to [facility] for trach decannulation 12/2, readmitted in 2/2022 with recurrent COVID-19 infection, initially in distributive shock. Blood cultures were also positive for serratia marcescens which he has had in the past.       1. 4 runs of WCT  - Asymptomatic  - Hemodynamically stable  - Baseline rhythm SR with HR in 120s on telemetry  - No labs ordered, per family's request  - Evening doses of Metoprolol and Mexiletine adminstered early  - Will closely monitor on tele, clinical status, and vitals  - Will endorse to primary team in AM        Merline Vincent PA-C  Department of Medicine  Spectra [phone]

## 2022-04-01 NOTE — PROVIDER CONTACT NOTE (OTHER) - DATE AND TIME:
16-Mar-2022 12:21
12-Mar-2022 13:15
31-Mar-2022 00:55
31-Mar-2022 11:55
10-Mar-2022 08:15
27-Mar-2022 12:41
11-Mar-2022 07:30
16-Mar-2022 22:10
01-Apr-2022 04:58
16-Mar-2022 12:28
16-Mar-2022 13:20
24-Mar-2022 23:03
28-Mar-2022 03:20
16-Mar-2022 03:55
19-Mar-2022 22:15
31-Mar-2022 20:02

## 2022-04-01 NOTE — PROGRESS NOTE ADULT - PROBLEM SELECTOR PLAN 7
- improving  - will also see if his antibiotics can be mixed with NS instead of D5  - during his previous hospitalization this was chronic and thought to be from hypovolemia due to poor PO intake (he was on sodium chloride 1 gram q8h which we may need to consider resuming)
Palliative care consult appreciated   on NGT feeds- patient high risk for PEG   Evaluated by IR and GI for PEG. Per last GI note NGT acceptable long term solution. Too high risk for PEG  d/w Sister 3/28 that PEG is not being offered
Previous history of thyrotoxicosis on methimazole.  TSH elevated  Monitoring off methimazole, repeat thyroid tests pending today
Previous history of thyrotoxicosis on methimazole.  TSH elevated  Monitoring off methimazole, repeat T3 and T4 low on 3/22
- overall stable
- overall stable, will continue to monitor for now
Previous history of thyrotoxicosis on methimazole.  TSH elevated  Monitoring off methimazole, repeat T3 and T4 low on 3/22
- Resolved, off NaCl
- improving  - remains on sodium chloride 1g q8hr
- noted to have slight drop in Na today, will continue to monitor for now  - may need to consider resume sodium chloride
- overall stable, currently Na 133  - will continue to monitor for now  - may need to consider resume sodium chloride
Palliative care consult appreciated   on NGT feeds- patient high risk for PEG   Evaluated by IR and GI for PEG. Per last GI note NGT acceptable long term solution. Too high risk for PEG  d/w Sister 3/28 that PEG is not being offered
palliative care consult appreciated   [facility] meeting 3/17 done - await PEG when medically improved  NGT feeds until then  f/up updated GI recs - per CHF team, PEG should be done under the supervision of cardiac anesthesia.
previous history of thyrotoxicosis on amiodarone now on methimazole
- overall stable, will continue to monitor for now
palliative care consult appreciated   continuing discussion regarding PEG placement  d/w Dr. Burgos  NGT feeds  f/up GI recs
- overall stable, will continue to monitor for now
Previous history of thyrotoxicosis on methimazole.  TSH elevated  Monitoring off methimazole, repeat T3 and T4 low on 3/22
Palliative care consult appreciated   on NGT feeds- patient high risk for PEG   Evaluated by IR and GI for PEG. Per last GI note NGT acceptable long term solution. Too high risk for PEG  d/w Sister 3/28 that PEG is not being offered
Previous history of thyrotoxicosis on methimazole.  TSH elevated  Monitoring off methimazole, repeat T3 and T4 low on 3/22
- overall stable, will continue to monitor for now
- overall stable, will continue to monitor for now
palliative care consult appreciated   [facility] meeting 3/17  NGT feeds  f/up GI recs - no plan for PEG currently
palliative care consult appreciated   [facility] meeting 3/17 done - await PEG  NGT feeds until then  f/up updated GI recs - per CHF team, PEG should be done under the supervision of cardiac anesthesia.
palliative care consult appreciated   [facility] meeting 3/17  d/w Dr. Burgos  NGT feeds  f/up GI recs - no plan for PEG currently
- improved
Previous history of thyrotoxicosis on methimazole.  TSH elevated  Per endocrine recs - monitor off methimazole and repeat thyroid tests 3/22
- improving  - remains on sodium chloride 1g q8hr
Previous history of thyrotoxicosis on methimazole.  TSH elevated  Per endocrine recs - monitor off methimazole and repeat thyroid tests 3/22
- keotube feeds were on hold due to new episodes of melena   - at this time patient is unable to tolerate PO diet. Per GI/IR patient was to high risk to undergo PEG placement
- overall stable, currently Na 133  - will continue to monitor for now  - may need to consider resume sodium chloride
palliative care consult appreciated   continuing discussion regarding PEG placement  d/w Dr. Burgos  NGT feeds  f/up GI recs
- will speak to dietary about whether his tube feeds should be adjusted  - will also see if his antibiotics can be mixed with NS instead of D5  - during his previous hospitalization this was chronic and thought to be from hypovolemia due to poor PO intake (he was on sodium chloride 1 gram q8h which we may need to consider resuming)
Previous history of thyrotoxicosis on methimazole.  TSH elevated  Per endocrine recs - monitor off methimazole and repeat thyroid tests 3/22
Previous history of thyrotoxicosis on methimazole.  TSH elevated  Monitoring off methimazole, repeat T3 and T4 low on 3/22
- overall stable, currently Na 133  - will continue to monitor for now  - may need to consider resume sodium chloride
palliative care consult appreciated   continuing discussion regarding PEG placement  d/w Dr. Burgos  NGT feeds  f/up GI recs
previous history of thyrotoxicosis on amiodarone now on methimazole
- improved  - please d/c sodium chloride 1g q8hr
- improving  - remains on sodium chloride 1g q8hr
palliative care consult appreciated   [facility] meeting 3/17  NGT feeds  f/up updated GI recs
Palliative care consult appreciated   on NGT feeds- patient high risk for PEG   Evaluated by IR and GI for PEG. Per last GI note NGT acceptable long term solution. Too high risk for PEG  d/w Sister 3/28 that PEG is not being offered
- overall stable

## 2022-04-01 NOTE — PROGRESS NOTE ADULT - PROBLEM SELECTOR PLAN 1
- 2/28 BCx + serratia/GNR  - remained on IV Cefepime   - CT C/A/P 3/18 concerning for mucous plugging with possible aspiration PNA  - palliative care following, family meeting held on 3/17. At this time the family wishes to continue with course of treatment. ID has cleared patient to undergo PEG placement however as per GI and IR team patient is high risk to undergo procedure.  - Family meeting 3/26 - plan remains unchanged. His family is waiting for God to decide until when he will be with us.

## 2022-04-01 NOTE — CHART NOTE - NSCHARTNOTEFT_GEN_A_CORE
Patient seen briefly this AM with HF team at bedside. He was in asystole.  LVAD turned off. Pronounced TOD at 10:12. Family notified by HF team, I notified palliative care team.     dw medicine ACP.

## 2022-04-01 NOTE — PROVIDER CONTACT NOTE (OTHER) - RECOMMENDATIONS
Consult with PA
Consult with PA given patient's situation; possibly give 22h00 medications early?
NP to come to bedside
Consult with ACP
Metoprolol??
Send stool occult lab that has been ordered.
Consult with PA
Np aware, ok to administer metoprolol early

## 2022-04-01 NOTE — PROGRESS NOTE ADULT - ASSESSMENT
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  S/p prolonged complex hospitalization from 6/14-11/16/2021 initially admitted with abdominal pain complicated by GIB, respiratory failure s/p trach, multiple infections, s/p cholecystotomy tube and SMA stent 10/2021, ultimately discharged to [facility] rehab and then returned to [facility] for trach decannulation 12/2. The patient had a recent admission with COVID 19 reinfection and distributive shock. That admission he had blood culture positive for serratia marcescens (discharged on levaquin).  He was treated with MAB, remdesivir, and steroids. He had recurrent VT and was started mexiletine.     Now coming in from [facility] ED with leukocytosis and AMS was treated for UTI. Initially required bipap but was weaned off here. Labs concerning here for WBC 26, hemoglobin of 7s then 6.7, creatinine of 1.3. His maps were initially in the 50s. Physical exam showing pus from stoma, sat of central access of 81.8, and tachycardia are all concerning for septic shock picture. He is s/p 1L fluids, 1uPRBCs, was placed on vaso. Patient has known episodes of VT and had recurrent NSVT/VT when he first arrived started on lidocaine infusion with improvement. Of note he was previously on mexiletine 150 TID outpatient, metoprolol ER 25 qAM and 50 qPM. Previous history of thyrotoxicosis on amiodarone.    He was found to be bacteremic with recurrent serratia, on IV abx possibly r/t LVAD infection vs splenic abscess noted on CT 1/19. Afebrile since 3/1. Vasopressin weaned off 3/4, started on midodrine which has now been off since 3/8. LVAD without s/s of pump malfunction. Currently he opens his eyes however remains nonverbal and not following commands. His mental status is still not back to baseline. His VT has now subsided, last episode was noted on 3/12.  His hyponatremia and hyperkalemia are overall improving after receiving the following treatment insulin/D50 and lokelma.  Recently his hemoglobin has continued to drop, will be transfused with 1 uPRBC on 3/16. Overall fairly stable from HF perspective. 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 After family discussion on 3/17, family wishes to continues course of treatment and are wanting to proceed with PEG placement however after further discussion with GI and IR patient is not a candidate.     More recently his course was further c/b by melena and drop in Hb. After discussion with ethics, it was determine that further blood draws and blood transfusions would be futile to the patient. Today on tele patient was noted to become bradycardiac followed by asystole. His LVAD was disconnected and TOD was pronounced. Family was notified.

## 2022-04-01 NOTE — PROVIDER CONTACT NOTE (OTHER) - REASON
HR sustaining 130's per tele monitor
multiple runs of WCT as notified by telemetry
HR sustaining 130s
Increased temp
NGT not flushing or pulling back
Increased temp during blood transfusion
MAP of 68 with parameters of 65 for metoprolol
Temperature during blood transfusion
Tele event
Ectopy on tele
Holding metoprolol
Patient low systolic but MAP in the mid 60s; high HR
Pt had 1 black tarry stool
Vtach
pt had black tarry stool
Inquiring about post CBC; Patient had 1 unit PRBCs today 3/19

## 2022-04-01 NOTE — DISCHARGE NOTE PROVIDER - NSDCFUSCHEDAPPT_GEN_ALL_CORE_FT
JOINT, RICKY ; 04/15/2022 ; [facility] RICKY Dow ; 06/10/2022 ; [facility]

## 2022-04-01 NOTE — PROGRESS NOTE ADULT - NS ATTEND AMEND GEN_ALL_CORE FT
Pt seen and examined with ACP.  Assessment and plan discussed. Agree with above.
Mr. Quispe is a 66/M with enst stage HF s/p HM 2 LVAD with multiple complications who has been DNR/DNI being medically treated for GI bleeding and Sepsis. this morning on rounds became bradycardic and asystolic.   Confirmed with exam and 12 lead EKG.   His LVAD was disconnected and  death was pronounced at 10.12am  I called his HCP (Sister) and informed about the event.   SW and Pall care to reach out to family for support.   Primary medical team and LVAD team was at bed side.
Clinically Unchanged but did communicate little bit compared to yesterday.   Discussion with Ethics today, Antibiotics and blood transfusion is a medical decision and given the futility this treatment its the medical teams decision on continuing such treatment.   Discussed with medicine team , hold of transfusion  Will do blood draws 2/weeks unless otherwise indicated  Also use pediatric tubes for blood draw  Will discuss with Palliative care team and have another family discussion later this week  Cont rest of the current care
Mr. Quispe is awake and alert but non verbal to questions  As per family meeting last week they still want to continue  treatments including IV antibiotics, transfusion and wish for him to have a PEG tube  IR and GI recommended he is high risk for the procedure, cont tube feeds through NG tube  Given recurrent prolonged hospitalization with multiple complication and co morbid conditions his prognosis is poor and current treatment will not be providing any meaning full recovery.   Will consult ethics   cont current care with abx  LVAD interrogation w/o significant alarms.
Similar clinically, non-verbal.   LVAD without any significant alarms  Given no benefit from current treatment and no alternative treatment can resolve his current medical problems and after careful consideration and review, will stop further blood transfusion and daily labs  Ethics consult recommendation appreciated  Family meeting to terri held tomorrow to inform the HCP about the medical teams decision.   No further blood work, no transfusion.
Remains critically ill. Nonverbal but alert/awake.   No VAD alarms.   Will plan for family meeting this week.
Remains critically ill with poor prognosis and no exit strategy.   Continue supportive care.   Family meeting scheduled for today.

## 2022-04-01 NOTE — DISCHARGE NOTE PROVIDER - HOSPITAL COURSE
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    S/p prolonged complex hospitalization from 6/14-11/16/2021 initially admitted with abdominal pain complicated by GIB, respiratory failure s/p trach, multiple infections, s/p cholecystotomy tube and SMA stent 10/2021, ultimately discharged to [facility] rehab and then returned to [facility] for trach decannulation 12/2. The patient had a recent admission with COVID 19 reinfection and distributive shock. That admission he had blood culture positive for serratia marcescens (discharged on levaquin).  He was treated with MAB, remdesivir, and steroids. He had recurrent VT and was started mexiletine.     Now coming in from [facility] ED with leukocytosis and AMS was treated for UTI. Initially required bipap but was weaned off here. Labs concerning here for WBC 26, hemoglobin of 7s then 6.7, creatinine of 1.3. His maps were initially in the 50s. Physical exam showing pus from stoma, sat of central access of 81.8, and tachycardia are all concerning for septic shock picture. He is s/p 1L fluids, 1uPRBCs, was placed on vaso. Patient has known episodes of VT and had recurrent NSVT/VT when he first arrived started on lidocaine infusion with improvement. Of note he was previously on mexiletine 150 TID outpatient, metoprolol ER 25 qAM and 50 qPM. Previous history of thyrotoxicosis on amiodarone.    He was found to be bacteremic with recurrent serratia, on IV abx possibly r/t LVAD infection vs splenic abscess noted on CT 1/19. Afebrile since 3/1. Vasopressin weaned off 3/4, started on midodrine which has now been off since 3/8. LVAD without s/s of pump malfunction. Currently he opens his eyes however remains nonverbal and not following commands. His mental status is still not back to baseline. His VT has now subsided, last episode was noted on 3/12.  His hyponatremia and hyperkalemia are overall improving after receiving the following treatment insulin/D50 and lokelma.  Recently his hemoglobin has continued to drop, will be transfused with 1 uPRBC on 3/16. Overall fairly stable from HF perspective. 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 After family discussion on 3/17, family wishes to continues course of treatment and are wanting to proceed with PEG placement however after further discussion with GI and IR patient is not a candidate.     Course further c/b by melena and drop in Hb. Per discussion with Ethics, due to futility of medical treatment will stop all blood draws and transfusions. Pt went into systole on 4/1 and declared dead. Family informed and LVAD turned off.

## 2022-04-01 NOTE — DISCHARGE NOTE FOR THE EXPIRED PATIENT - HOSPITAL COURSE
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    S/p prolonged complex hospitalization from 6/14-11/16/2021 initially admitted with abdominal pain complicated by GIB, respiratory failure s/p trach, multiple infections, s/p cholecystotomy tube and SMA stent 10/2021, ultimately discharged to [facility] rehab and then returned to [facility] for trach decannulation 12/2. The patient had a recent admission with COVID 19 reinfection and distributive shock. That admission he had blood culture positive for serratia marcescens (discharged on levaquin).  He was treated with MAB, remdesivir, and steroids. He had recurrent VT and was started mexiletine.     Now coming in from [facility] ED with leukocytosis and AMS was treated for UTI. Initially required bipap but was weaned off here. Labs concerning here for WBC 26, hemoglobin of 7s then 6.7, creatinine of 1.3. His maps were initially in the 50s. Physical exam showing pus from stoma, sat of central access of 81.8, and tachycardia are all concerning for septic shock picture. He is s/p 1L fluids, 1uPRBCs, was placed on vaso. Patient has known episodes of VT and had recurrent NSVT/VT when he first arrived started on lidocaine infusion with improvement. Of note he was previously on mexiletine 150 TID outpatient, metoprolol ER 25 qAM and 50 qPM. Previous history of thyrotoxicosis on amiodarone.    He was found to be bacteremic with recurrent serratia, on IV abx possibly r/t LVAD infection vs splenic abscess noted on CT 1/19. Afebrile since 3/1. Vasopressin weaned off 3/4, started on midodrine which has now been off since 3/8. LVAD without s/s of pump malfunction. Currently he opens his eyes however remains nonverbal and not following commands. His mental status is still not back to baseline. His VT has now subsided, last episode was noted on 3/12.  His hyponatremia and hyperkalemia are overall improving after receiving the following treatment insulin/D50 and lokelma.  Recently his hemoglobin has continued to drop, will be transfused with 1 uPRBC on 3/16. Overall fairly stable from HF perspective. 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 After family discussion on 3/17, family wishes to continues course of treatment and are wanting to proceed with PEG placement however after further discussion with GI and IR patient is not a candidate.     Course further c/b by melena and drop in Hb. Per discussion with Ethics, due to futility of medical treatment will stop all blood draws and transfusions. Pt went into systole on 4/1 and declared dead. Family informed and LVAD turned off.

## 2022-04-01 NOTE — PROGRESS NOTE ADULT - SUBJECTIVE AND OBJECTIVE BOX
Subjective: Patient seen and examined resting in bed. Family meeting schedule for 3pm today    Medications:  ascorbic acid 500 milliGRAM(s) Oral daily  cefepime   IVPB 1000 milliGRAM(s) IV Intermittent every 12 hours  chlorhexidine 2% Cloths 1 Application(s) Topical <User Schedule>  cholecalciferol 2000 Unit(s) Oral daily  HYDROmorphone  Injectable 0.2 milliGRAM(s) IV Push every 2 hours PRN  insulin lispro (ADMELOG) corrective regimen sliding scale   SubCutaneous every 6 hours  metoprolol tartrate 50 milliGRAM(s) Oral three times a day  mexiletine 150 milliGRAM(s) Oral every 8 hours  multivitamin 1 Tablet(s) Oral daily  pantoprazole  Injectable 40 milliGRAM(s) IV Push two times a day  polyethylene glycol 3350 17 Gram(s) Oral daily PRN  senna 2 Tablet(s) Oral at bedtime  sertraline 100 milliGRAM(s) Oral daily  sodium chloride 0.9% Bolus 250 milliLiter(s) IV Bolus once  thiamine 100 milliGRAM(s) Oral daily  traMADol 25 milliGRAM(s) Oral once    Vitals:  Vital Signs Last 24 Hours  T(C): 36.4 (04-01-22 @ 04:00), Max: 36.8 (03-31-22 @ 11:44)  HR: 123 (04-01-22 @ 04:00) (109 - 132)  BP: 88/64 (03-31-22 @ 11:40) (81/63 - 88/64)  RR: 20 (04-01-22 @ 04:00) (16 - 20)  SpO2: 98% (04-01-22 @ 04:00) (97% - 100%)        I&O's Summary    31 Mar 2022 07:01  -  01 Apr 2022 07:00  --------------------------------------------------------  IN: 0 mL / OUT: 895 mL / NET: -895 mL        Physical Exam  General: frail and lethargic   Neck: Neck supple. JVP not elevated. No masses  Chest: Clear to auscultation bilaterally  CV: +VAD hum  Abdomen: Soft, non-distended, non-tender  Neurology: does not following commands, remains non verbal     LVAD Interrogation  VAD TYPE HM 2  Speed 9200  Flow 3.6  Power 4.8  PI  2.8  Assessment of driveline exit site: driveline dressing c/d/i  Programming changes: no changes made    Labs:                        6.0    9.66  )-----------( 198      ( 31 Mar 2022 01:16 )             19.6                             Imaging Studies

## 2022-04-01 NOTE — PROVIDER CONTACT NOTE (OTHER) - BACKGROUND
Patient admitted for sepsis
Pt admitting dx sepsis
Pt admitted for sepsis.
transferred from [facility] ED with metabolic encephalopathy, hypercarbic respiratory failure, septic shock, bacteremia. Lvad HM2 9/2017
transferred from [facility] ED with metabolic encephalopathy, hypercarbic respiratory failure, septic shock, bacteremia. Lvad HM2 9/2017
Pt admitted for sepsis
Pt admitting dx sepsis
Patient admitted for Sepsis and has the HM2 LVAD. GOC meetings have been occurring
Patient admitted for AMS and Sepsis; Patient has LVAD HMII
DX: Sepsis
Patient admitted for Sepsis; Patient has HMII LVAD from 9/2017; Patient DNR/DNI

## 2022-04-01 NOTE — DISCHARGE NOTE PROVIDER - CARE PROVIDER_API CALL
James Grey)  Cardiology; Internal Medicine  [address] [place] [address]  Phone: [phone]  Fax: [phone]  Follow Up Time:

## 2022-04-01 NOTE — PROGRESS NOTE ADULT - PROBLEM SELECTOR PROBLEM 6
Anemia
Dysphagia
Anemia
Dysphagia
Anemia
Anemia
Dysphagia
Anemia
Dysphagia
Anemia
Anemia
Dysphagia
Palliative care encounter
Anemia
Anemia
Dysphagia
Hyperthyroidism
Palliative care encounter
Anemia
Hyperkalemia
Anemia
Anemia
Dysphagia
Dysphagia
Anemia
Hyperkalemia
Anemia
Anemia
Hyperkalemia
Anemia
Dysphagia
Hyperkalemia
Palliative care encounter
Anemia
Altered mental status
Anemia
Anemia
Dysphagia
Anemia
Hyperkalemia
Anemia
Dysphagia
Hyperkalemia

## 2022-04-01 NOTE — CHART NOTE - NSCHARTNOTESELECT_GEN_ALL_CORE
Event Note
Guiac positive/Event Note
[facility] CCU transfer/Event Note
NGT/Event Note
Vtach/Event Note
WCT/Event Note
eeg preliminary
endocrinology/Event Note
Event Note
GI Fellow/Event Note
Neurology/Event Note
Nutrition Services
Palliative Medicine and Geriatrics ([facility])./Event Note
Palliative Medicine and Geriatrics ([facility])/Event Note
Transfer Note
Wound Care Team Note:/Event Note
Wound Care Team Note:/Event Note

## 2022-04-01 NOTE — PROGRESS NOTE ADULT - PROBLEM SELECTOR PROBLEM 7
Hyponatremia
Dysphagia
Dysphagia
Hyperthyroidism
Hyperthyroidism
Dysphagia
Hyperthyroidism
Hyponatremia
Hyperthyroidism
Hyponatremia
Dysphagia
Hyponatremia
Hyponatremia
Dysphagia
Hyperthyroidism
Hyponatremia
Hyperthyroidism
Hyperthyroidism
Hyponatremia
Dysphagia
Hyperthyroidism
Hyponatremia
Dysphagia
Dysphagia
Hyperthyroidism
Hyponatremia
Hyperthyroidism
Hyponatremia
Hyponatremia
Hyperthyroidism

## 2022-04-01 NOTE — PROGRESS NOTE ADULT - NS ATTEND OPT1 GEN_ALL_CORE
I attest my time as attending is greater than 50% of the total combined time spent on qualifying patient care activities by the PA/NP and attending.
I attest my time as attending is greater than 50% of the total combined time spent on qualifying patient care activities by the PA/NP and attending.
I independently performed the documented:
I attest my time as attending is greater than 50% of the total combined time spent on qualifying patient care activities by the PA/NP and attending.
I independently performed the documented:

## 2022-04-01 NOTE — DISCHARGE NOTE PROVIDER - NSDCCPCAREPLAN_GEN_ALL_CORE_FT
PRINCIPAL DISCHARGE DIAGNOSIS  Diagnosis: Congestive heart failure, NYHA class 4 and ACC/AHA stage D  Assessment and Plan of Treatment: When you have heart failure, your heart does not pump as well as it should. It does not squeeze or fill as well as before. As a result, the heart struggles to keep blood moving, but it lags behind. The organs in your body might not get as much blood as they used to, especially when you exercise. Also, fluid builds up in the body.  Fluid can build up in the feet and ankles. That's why people with heart failure sometimes get swollen ankles. Fluid can also build up in the lungs. That's why people with heart failure sometimes have trouble breathing. Having fluid in the lungs can be life-threatening. Fluid in the lungs is the number 1 reason people with heart failure end up in the hospital. But it is often avoidable. To keep heart failure from getting worse, and to keep yourself breathing as well as possible, one of the most important things you can do is to keep your body from holding onto extra fluid. That's why it is so important to take your medicines for heart failure and look for warning signs. Medicines to treat heart failure can help you feel better and live longer. They can help keep fluid out of your lungs and help you breathe better. Missing just one dose of medicine can make a big difference in how you feel. When heart failure is getting worse, the body gives off the following clues: Weight gain; Increased swelling in the feet, ankles, legs, or other parts of the body; Increased tiredness or trouble breathing. If you spot one or more of these signs, you should take action. Your body is telling you that things are starting to go wrong.        SECONDARY DISCHARGE DIAGNOSES  Diagnosis: Congestive heart failure, NYHA class 4 and ACC/AHA stage D  Assessment and Plan of Treatment:

## 2022-04-01 NOTE — PROGRESS NOTE ADULT - PROBLEM SELECTOR PLAN 6
- since being admitted has been transfused multiple times, last on 3/24.   - continue to trend daily   - High risk for EGD/colonoscopy at this time  - Transfuse as needed
lokelma prn k>5.5  monitor BMP  changed tube feed formula
lokelma prn k>5.5  monitor BMP  changed tube feed formula
- since being admitted has been transfused multiple times, last on 3/24.   - continue to trend daily   - High risk for EGD/colonoscopy at this time  - Transfuse as needed
- s/p 1 unit PRBC 3/16 and 3/18.  - please send fecal occult   - continue to trend daily
Palliative care consult appreciated   GOC meeting occurred on 3/17 - on NGT feeds- patient high risk for PEG
- s/p 1 unit PRBC 3/6  - Hgb currently stable with no signs of bleeding
- s/p 1 unit PRBC 3/6, 3/16, and 3/19.   - continue to trend daily   - High risk for EGD/colonoscopy at this time
- s/p 1 unit PRBC 3/6, 3/16, and 3/19.   - continue to trend daily   - High risk for EGD/colonoscopy at this time
- stable with no signs of bleeding
lokelma prn k>5.5  monitor BMP  changed tube feed formula
- stable  - send iron studies
Will continue to f/u for GOC and support.        Francisco Burgos MD   Geriatrics and Palliative Care (GAP) Consult Service    of Geriatric and Palliative Medicine  [facility]      Please page the following number for clinical matters between the hours of 9 am and 5 pm from Monday through Friday : [phone]    After 5pm and on weekends, please see the contact information below:    In the event of newly developing, evolving, or worsening symptoms, please contact the Palliative Medicine team via pager (if the patient is at [facility] #[phone] or if the patient is at [facility] #[phone]) The Geriatric and Palliative Medicine service has coverage 24 hours a day/ 7 days a week to provide medical recommendations regarding symptom management needs via telephone
lokelma prn k>5.5  monitor BMP  changed tube feed formula
- today patients Hgb dropped to 5.5 and now having melena. Will be transfused with 2uPRBC today. Of note was previously transfused with PRBC last on 3/19.   - continue to trend daily   - High risk for EGD/colonoscopy at this time
- since being admitted has been transfused multiple times, last on 3/24.   - continue to trend daily   - High risk for EGD/colonoscopy at this time
- stable with no signs of bleeding
- since being admitted has been transfused multiple times, last on 3/24.   - continue to trend daily   - High risk for EGD/colonoscopy at this time  - Transfuse as needed for Hgb < 7.0
Palliative care consult appreciated   [facility] meeting occurred on 3/17 - on NGT feeds- patient high risk for PEG   Evaluated by IR and GI for PEG. Per last GI note NGT acceptable long term solution   Will consider eval by surgery if patient otherwise becomes medically stable
Will continue to f/u for GOC and support.        Francisco Burgos MD   Geriatrics and Palliative Care (GAP) Consult Service    of Geriatric and Palliative Medicine  [facility]      Please page the following number for clinical matters between the hours of 9 am and 5 pm from Monday through Friday : [phone]    After 5pm and on weekends, please see the contact information below:    In the event of newly developing, evolving, or worsening symptoms, please contact the Palliative Medicine team via pager (if the patient is at [facility] #[phone] or if the patient is at [facility] #[phone]) The Geriatric and Palliative Medicine service has coverage 24 hours a day/ 7 days a week to provide medical recommendations regarding symptom management needs via telephone
lokelma prn k>5.5  monitor BMP  changed tube feed formula
- since being admitted has been transfused multiple times, last on 3/31. Any further transfusions would be futile. Please stop drawing blood    - can stop trending  - High risk for EGD/colonoscopy at this time
Palliative care consult appreciated   GOC meeting occurred on 3/17 - PEG when medically improved, NGT feeds until then
- s/p 1 unit PRBC 3/6 and 3/16. No signs of active bleeding  - fecal occult pending  - continue to trend daily
- since being admitted has been transfused multiple times, last on 3/24.   - continue to trend daily   - High risk for EGD/colonoscopy at this time
lokelma prn k>5.5  monitor BMP  changed tube feed formula
- since being admitted has been transfused multiple times, last on 3/24.   - can stop trending  - High risk for EGD/colonoscopy at this time
- s/p 1 unit PRBC 3/6 and 3/16.   - H/H continue to drift down. Recommend RBCs today.   - High risk for EGD/colonoscopy at this time
palliative care consult appreciated   continuing discussion regarding PEG placement
- s/p 1 unit PRBC 3/6. Due to drop in Hgb will be given additional unit PRBC today  - please send fecal occult   - continue to trend daily
Intermittent H/H drops requiring transfusions  Transfused 2 units PRBCs 3/24 for Hgb 5.5, post transfusion Hgb 9.7  High risk for EGD/colonoscopy from GI and cardiology evaluations  Daily CBC  Had melena 3/27, + guiac. Already on PPI IV BID, maintain active T&S and transfuse to hbg > 7 per HF  If having more melena hold tube feeds
Intermittent H/H drops requiring transfusions  Transfused 2 units PRBCs 3/24 for Hgb 5.5, post transfusion Hgb 9.7  High risk for EGD/colonoscopy from GI and cardiology evaluations  Had melena 3/27, + guiac but none since then. Already on PPI IV BID  d/w HF attending 3/29 - frequent blood draws also likely contributing to anemia. check BW 2x/week unless clinical change in status. No long term benefit to frequent transfusions
Will continue to f/u for GOC and support.        Francisco Burgos MD   Geriatrics and Palliative Care (GAP) Consult Service    of Geriatric and Palliative Medicine  [facility]      Please page the following number for clinical matters between the hours of 9 am and 5 pm from Monday through Friday : [phone]    After 5pm and on weekends, please see the contact information below:    In the event of newly developing, evolving, or worsening symptoms, please contact the Palliative Medicine team via pager (if the patient is at [facility] #[phone] or if the patient is at [facility] #[phone]) The Geriatric and Palliative Medicine service has coverage 24 hours a day/ 7 days a week to provide medical recommendations regarding symptom management needs via telephone
- s/p 1 unit PRBC 3/6  - Hgb currently stable with no signs of bleeding
- s/p 1 unit PRBC 3/6  - Hgb currently stable with no signs of bleeding
- s/p 1 unit PRBC 3/6, 3/16, and 3/19.   - continue to trend daily   - High risk for EGD/colonoscopy at this time
- since being admitted he has opened his eyes however remains nonverbal and not following commands  - head CT has been unremarkable  - psych/neuro input appreciated
lokelma prn k>5.5  monitor BMP  changed tube feed formula
lokelma prn k>5.5  monitor BMP  changed tube feed formula
- s/p 1 unit PRBC 3/6  - Hgb currently stable with no signs of bleeding
Palliative care consult appreciated   GOC meeting occurred on 3/17 - PEG when medically improved, NGT feeds until then
Palliative care consult appreciated   GOC meeting occurred on 3/17 - PEG when medically improved, NGT feeds until then
Palliative care consult appreciated   GOC meeting occurred on 3/17 - on NGT feeds- patient high risk for PEG
- stable  - send iron studies
- s/p 1 unit PRBC 3/6  - Hgb continue to drop with no signs of bleeding  - continue to trend daily
previous history of thyrotoxicosis on amiodarone now on methimazole
palliative care consult appreciated   continuing discussion regarding PEG placement  d/w Dr. Burgos
- s/p 1 unit PRBC 3/6  - Hgb currently stable with no signs of bleeding
- s/p 1 unit PRBC 3/6  - stable with no signs of bleeding
Palliative care consult appreciated   GOC meeting occurred on 3/17 - PEG when medically improved, NGT feeds until then
Intermittent H/H drops requiring transfusions  Transfused 2 units PRBCs 3/24 for Hgb 5.5, post transfusion Hgb 9.7  High risk for EGD/colonoscopy from GI and cardiology evaluations  Daily CBC  Had melena 3/27, + guiac. Already on PPI IV BID, maintain active T&S and transfuse to hbg > 8  If having more melena hold tube feeds
Palliative care consult appreciated   GOC meeting occurred on 3/17 - PEG when medically improved, NGT feeds until then
Palliative care consult appreciated   [facility] meeting occurred on 3/17 - on NGT feeds- patient high risk for PEG   Evaluated by IR and GI for PEG. Per last GI note NGT acceptable long term solution   Will consider eval by surgery if patient otherwise becomes medically stable
Intermittent H/H drops requiring transfusions  Transfused 2 units PRBCs 3/24 for Hgb 5.5, post transfusion Hgb 9.7  Too high risk for EGD/colonoscopy from GI and cardiology evaluations  Had melena 3/27, + guiac. melena 3/30 overnight. On PPI BID  Discussed with ethics as well, consult pending. Blood draws and transfusions not curative in this patient with bridge to definitive treatment and discharge. Medically futile - stop blood draws and transfusions.  Hold tube feeds for melena

## 2022-04-01 NOTE — PROGRESS NOTE ADULT - PROBLEM SELECTOR PLAN 5
- since being admitted has been transfused multiple times, last on 3/31. Any further transfusions would be futile.   - Patient was to high risk for EGD/colonoscopy during this admission

## 2022-04-01 NOTE — PROVIDER CONTACT NOTE (OTHER) - SITUATION
HR sustaining 130s
Pt had multiple runs of Vtach with 36 seconds being the longest
Pt. with SBP <90. Per parameters must hold metoprolol.
Oral temperature increased from 98.3 orally to 99.4 orally within first 15 minutes of blood transfusion.
MAP of 68 with parameters of 65 for metoprolol
Patient low systolic but MAP in the mid 60s; high HR
multiple runs of WCT as notified by telemetry
Reassessment temperature 99.5
Inquiring about post CBC; Patient had 1 unit PRBCs today 3/19
Pt. noted with ten wide complex beats followed by ten additional wide complex beats. VS as charted.
Temperature 99.2 one hour intra blood tranfusion.
6 then 7 beats WCT on tele
NGT not flushing or pulling back
pt had black tarry stool
HR sustaining 130's per tele monitor. VS: MAP 76, , 99.5F, R 26, 95% RA.
Pt had 1 black tarry stool

## 2022-04-01 NOTE — PROGRESS NOTE ADULT - REASON FOR ADMISSION
Septic shock

## 2022-04-01 NOTE — PROGRESS NOTE ADULT - PROBLEM SELECTOR PLAN 3
- no AC given history of GIB  - After patient became asystole and was confirmed on EKG his LVAD was disconnected by LVAD coordinator

## 2022-04-01 NOTE — PROVIDER CONTACT NOTE (OTHER) - ASSESSMENT
A & O x 3. Follows commands. Nonverbal.
Absence of nonverbal indicators of pain or discomfort. BP: 132/73 (87) . K: 5.5
Non verbal at baseline. Follows commands.
Pt A&Ox0,  Pt at baseline nonverbal. LVAD Heartmate 2. Incontinent to stool and urine.
Pt a&ox0. VSS
Vitals in flow sheets; Patient initial MAP start of shift was 76 then 72 then 68. Dowtrending numbers. Flow in mid threes and PI in 2-3s.
MAP WNL. Tachycardic.
Patient VS and LVAD numbers are in flow sheets. Patient is DNR/DNI with the next GOC meeting 4/1; Patient is Sinus tach 120s to 130s and has metoprolol and mexitil.
Patient at baseline and is the same as yesterday night 3/18. Patient had blood given in the afternoon; post 2 hour CBC was not drawn. Hgb prior to 1 unit today 7.1
Patient still awakens to name vocally and gentle touching. Patient at baseline nonverbal. Patient has not been getting mexitil and metoprolol for parameter reasons and has been tachy. Patient receiving blood throughout the day. Systolic in the 80s but MAP in the mid 60s.
Pt. A&O 0-1, was breathing tachy around R 24-26/min., HR around 130's. Provider notified the finding,
Pt is A+Ox1, all other vss
pt a&ox0. VSS
Pt with labored breathing & lethargy. Does not follow commands. BP stable.

## 2022-04-08 NOTE — PROGRESS NOTE ADULT - PROBLEM SELECTOR PLAN 5
Addended by: FRANCO ESPARZA on: 4/8/2022 01:42 PM     Modules accepted: Orders     - continue mexiletine  - EP following

## 2022-04-15 ENCOUNTER — APPOINTMENT (OUTPATIENT)
Dept: ELECTROPHYSIOLOGY | Facility: CLINIC | Age: 67
End: 2022-04-15

## 2022-06-10 ENCOUNTER — APPOINTMENT (OUTPATIENT)
Dept: ELECTROPHYSIOLOGY | Facility: CLINIC | Age: 67
End: 2022-06-10

## 2022-06-23 NOTE — PROGRESS NOTE ADULT - PROBLEM SELECTOR PLAN 1
Thank you, Dr. Bansal, for asking the [facility] team to see Ms. Alina Martell to evaluate PAF.    Assessment/Recommendations     Assessment/Plan:    Diagnoses and all orders for this visit:  Paroxysmal atrial fibrillation (H) and having occasional episodes of atrial fibs which are controlled with only 1 dose of flecainide 50 mg.  She has worked on being less anxious and relaxing when she goes into atrial fib and this is helped her convert sooner.  She has done a lot of Internet reading and wants to proceed with PVI.  I reviewed what to expect with ablation, postprocedure recovery and signs and symptoms to watch for and call us with post ablation.  See AVS for more details.  -     Follow-Up with Cardiology; Future  Essential hypertension and blood pressure at goal.  Obstructive sleep apnea syndrome and on CPAP and to bring CPAP machine with the day of procedure.  She is using it consistently.      [id number] score of 2 and on Eliquis.  I stressed the importance of not missing any doses of Eliquis from now on through especially the month post ablation  Follow up in clinic with me in 7 to 9 weeks which will be a post ablation follow-up.  I anticipate referring her back to Dr. Joseph to address anticoagulation 3 months post ablation.     History of Present Illness/Subjective     Alina Martell is a very pleasant 56 year old female who comes in today for history and physical prior to pulmonary vein isolation ablation with Dr. Joseph on June 30.  Alina Martell has a known history of paroxymal atrial fibrillation, HTN, Sjogren syndrome, rheumatoid arthritis, RANDALL on CPAP, anxiety, and morbid obesity.  Alina was first diagnosed with atrial fibrillation when she went to the emergency room with palpitations in September 2020 and cardioversion was performed for atrial fib.  She has had recurrence of starting in January 2022 with again another cardioversion in the ER.  She has had  further episodes of atrial fibrillation which have been successfully treated with as needed flecainide 50 mg.  She has seen Dr. Joseph in consult and wishes to proceed with PVI.  She is continuing to have occ episodes of afib.  Outside of afib, she denies any cardiac symptoms.  This visit will serve as history and physical for PVI.  See problem list for more details.  Medical, past medical, surgical and social history reviewed and updated.  Meds and allergies reviewed.   No personal or family history of adverse reactions to anesthesia or abnormal bleeding with surgery.        Cardiographics (reviewed):  February 2022 coronary CT shows:  Interpretation Summary    1.  Normal left main.  2.  Normal LAD and its branches.  3.  Minimal disease in proximal LCx.  4.  Normal RCA and its branches.  Right dominant system.  October 2020 echo shows:    Narrative & Impression    Normal left ventricular size with mild hypertrophy noted.    Left ventricle ejection fraction is normal. The calculated left ventricular ejection fraction is 67%.    Normal right ventricular size and systolic function.    No hemodynamically significant valvular heart abnormalities.    No previous study for comparison.  Cardiac testing personally reviewed:  Twelve-lead ECG today shows normal sinus rhythm of 79 and QTC of 444 ms.         Problem List:  Patient Active Problem List   Diagnosis     Depressed     Seropositive rheumatoid arthritis of multiple sites (H)     Recurrent major depressive disorder, in full remission (H)     Paroxysmal atrial fibrillation (H)     Morbid obesity (H)     Microcytic anemia     Anxiety     Chest pain     Chronic pain     Cyclic citrullinated peptide (CCP) antibody positive     Grief     Insomnia related to another mental disorder     Migraine headache     Olecranon bursitis of right elbow     Panic attack     ADA (generalized anxiety disorder)     Reflux     Sicca syndrome (H)     Sleep disorder     Tendonitis of both  shoulders     Obstructive sleep apnea syndrome     Essential hypertension     Coronary artery disease of native artery with stable angina pectoris (H)     Postmenopausal bleeding     History of colonic polyps     Revi  e  Physical Examination Review of Systems   w rosa  LMP 12/21/2021   There is no height or weight on file to calculate BMI.  Wt Readings from Last 3 Encounters:   03/17/22 129.3 kg (285 lb)   03/15/22 127.9 kg (281 lb 14.4 oz)   03/10/22 122.5 kg (270 lb 1 oz)     General Appearance:   Alert, well-appearing and in no acute distress.   HEENT: Atraumatic, normocephalic.  No scleral icterus, normal conjunctivae; mucous membranes pink and moist.     Chest: Chest symmetric, spine straight.   Lungs:   Respirations unlabored: Lungs are clear to auscultation.   Cardiovascular:   Normal first and second heart sounds with no murmurs, rubs, or gallops.  Regular, regular.   Normal JVD, no edema.       Extremities: No cyanosis or clubbing   Musculoskeletal: Moves all extremities   Skin: Warm, dry, intact.    Neurologic: Mood and affect are appropriate, alert and oriented to person, place, time, and situation     ROS: 10 point ROS neg other than the symptoms noted above in the HPI.     Medical History  Surgical History Family History Social History     Past Medical History:   Diagnosis Date     Anemia      Antiplatelet or antithrombotic long-term use      Arrhythmia      Cannabis use without complication 12/8/2014     Carpal tunnel syndrome      Coronary artery disease      Gastroesophageal reflux disease      History of angina      Hypertension      Irregular heart beat      Obesity      Paroxysmal atrial fibrillation (H)      PTSD (post-traumatic stress disorder)      RA (rheumatoid arthritis) (H)      Rheumatoid arthritis (H) 7/8/2016     Sicca syndrome (H)      Sleep apnea     Past Surgical History:   Procedure Laterality Date     CHOLECYSTECTOMY       DILATION AND CURETTAGE, OPERATIVE HYSTEROSCOPY, COMBINED  N/A 3/3/2022    Procedure: HYSTEROSCOPY, WITH DILATION AND CURETTAGE;  Surgeon: Irma Cary MD;  Location: [facility]     HC REMOVAL GALLBLADDER      Description: Cholecystectomy;  Recorded: 10/15/2013;     LAPAROSCOPIC TUBAL LIGATION       RELEASE CARPAL TUNNEL BILATERAL       TUBAL LIGATION  1995    Family History   Problem Relation Age of Onset     Crohn's Disease Mother         Total colectomy with ileostomy.     Psoriasis Brother      Snoring Brother      Snoring Father      Diabetes Father      Prostate Cancer Father      Chronic Kidney Disease Father         Chose against dialysis.     Substance Abuse Brother      Depression Brother      Attention Deficit Disorder Brother      Alcoholism Brother      No Known Problems Daughter      No Known Problems Son      Alcoholism Brother      Substance Abuse Brother      Lupus Cousin      Alcoholism Maternal Grandfather      Breast Cancer No family hx of     History   Smoking Status     Never Smoker   Smokeless Tobacco     Never Used     Comment: smokes marijuana     Social History    Substance and Sexual Activity      Alcohol use: Not Currently        Comment: Alcoholic Drinks/day: Never an issue, she states.  7/8/16       Medications  Allergies     Current Outpatient Medications   Medication Sig Dispense Refill     acetaminophen (TYLENOL) 325 MG tablet Take 3 tablets (975 mg) by mouth every 6 hours as needed for mild pain 50 tablet 0     adalimumab (HUMIRA *CF* PEN) 40 MG/0.4ML pen kit INJECT 1 PEN UNDER THE SKIN EVERY 14 DAYS. HOLD FOR SIGNS OF INFECTION, THEN SEEK MEDICAL ATTENTION. 2 each 3     apixaban ANTICOAGULANT (ELIQUIS ANTICOAGULANT) 5 MG tablet Take 1 tablet (5 mg) by mouth 2 times daily 180 tablet 1     diltiazem ER COATED BEADS (CARDIZEM CD/CARTIA XT) 180 MG 24 hr capsule Take 2 capsules (360 mg) by mouth daily 180 capsule 3     EPINEPHrine (ANY BX GENERIC EQUIV) 0.3 MG/0.3ML injection 2-pack Inject 0.3 mg into the muscle as needed for  anaphylaxis        flecainide (TAMBOCOR) 50 MG tablet Take 1 tablet (50 mg) by mouth 2 times daily as needed (at onset of a fib, may repeat once after 4 hours) 60 tablet 1     hydrOXYzine (ATARAX) 25 MG tablet Take 1 tablet (25 mg) by mouth 3 times daily as needed for anxiety 270 tablet 3     Lidocaine (LIDOCARE) 4 % Patch Place 1 patch onto the skin every 24 hours To prevent lidocaine toxicity, patient should be patch free for 12 hrs daily. 5 patch 0     loratadine (CLARITIN) 10 MG tablet Take 10 mg by mouth daily        Multiple Vitamins-Minerals (CENTROVITE) TABS TAKE 1 TABLET BY MOUTH EVERY DAY FOR 30 DAYS       omeprazole (PRILOSEC) 40 MG DR capsule Take 1 capsule (40 mg) by mouth in the morning. 90 capsule 3     ondansetron (ZOFRAN-ODT) 4 MG ODT tab Take 1-2 tablets (4-8 mg) by mouth every 8 hours as needed for nausea 4 tablet 0     polyethylene glycol (MIRALAX) 17 GM/Dose powder Take 17 g (1 capful) by mouth daily 578 g 3     prazosin (MINIPRESS) 1 MG capsule Take 1 capsule by mouth nightly as needed        sertraline (ZOLOFT) 25 MG tablet Take 1 tablet (25 mg) by mouth daily 90 tablet 0     traZODone (DESYREL) 50 MG tablet Take 0.5 tablets (25 mg) by mouth At Bedtime 90 tablet 0     vitamin C (ASCORBIC ACID) 1000 MG TABS Take 1,000 mg by mouth daily        vitamin D3 (CHOLECALCIFEROL) 250 mcg (64715 units) capsule Take 1 capsule by mouth once a week        Allergies   Allergen Reactions     Penicillins Shortness Of Breath, Palpitations, Dizziness, Anaphylaxis, Hives, Itching and Rash     Test in 2031, see allergy note on 7/29/21     Shellfish-Derived Products Anaphylaxis     Sulfa Drugs Hives and Anaphylaxis      Medical, surgical, family, social history, and medications were all reviewed and updated as necessary.   Lab Results    Chemistry/lipid CBC Cardiac Enzymes/BNP/TSH/INR   Recent Labs   Lab Test 01/21/21  0746   CHOL 169   HDL 46*      TRIG 79     Recent Labs   Lab Test 01/21/21  0746  09/16/19  0739 11/14/18  0810    115 78     Recent Labs   Lab Test 03/08/22 2025      POTASSIUM 3.9   CHLORIDE 101   CO2 28      BUN 12   CR 0.77   GFRESTIMATED >90   JOSSELIN 8.7     Recent Labs   Lab Test 03/08/22 2025 02/16/22  1108 01/28/22  1648   CR 0.77 0.74 0.73     No results for input(s): A1C in the last 72198 hours.       Recent Labs   Lab Test 03/16/22  1717   WBC 12.4*   HGB 11.5*   HCT 37.0   MCV 87        Recent Labs   Lab Test 03/16/22 1717 03/08/22 2025 03/08/22  1842   HGB 11.5* 12.4 12.2    Recent Labs   Lab Test 03/08/22 2025 01/17/22  1406 01/16/22  2301   TROPONINI <0.01 <0.01 <0.01     No results for input(s): BNP, NTBNPI, NTBNP in the last 83280 hours.  Recent Labs   Lab Test 02/16/22  1108   TSH 2.16     No results for input(s): INR in the last 95752 hours.       Total Time-45 minutes spent on date of encounter doing chart review, history and exam, documentation and further activities as noted above.  This note has been dictated using voice recognition software. Any grammatical, typographical, or context distortions are unintentional and inherent to the software.                          resolved

## 2022-06-28 NOTE — DISCHARGE NOTE ADULT - NS AS DC FOLLOWUP STROKE INST
Patient lives at [facility]. 
Coumadin/Warfarin

## 2022-08-23 NOTE — PROVIDER CONTACT NOTE (OTHER) - NAME OF MD/NP/PA/DO NOTIFIED:
CLAUDIA BOSWELL
Pre-op Diagnosis: Bladder tumor [D49.4]    The above referenced H&P was reviewed by Corinne Rojas MD on 8/23/2022, the patient was examined and no significant changes have occurred in the patient's condition since the H&P was performed. I discussed with the patient a the potential benefits, risks and side effects of this procedure; the likelihood of the patient achieving goals; and potential problems that might occur during recuperation. I discussed reasonable alternatives to the procedure, including risks, benefits and side effects related to the alternatives and risks related to not receiving this procedure. I answered patient's questions on surgery and on treatment and patient understands and wants to proceed with surgery as scheduled. We will proceed with procedure as planned.
CLAUDIA Foley
CLAUDIA Rodriguez
Eli Quinn
TONEY MAST
Milka Felton, NP
Sydney Ibrahim NP
CLAUDIA BOSWELL
CLAUDIA Vaz
MD WORKMAN
Chuckie Weiner NP
Merline TAYLOR
Merline TAYLOR
PATIENCE SANTAMARIA
Maryann TAYLOR
Sondra Edwige Burgos, NP

## 2022-08-29 NOTE — PROGRESS NOTE ADULT - ASSESSMENT
August 17, 2017      Nohemi Dial  [address]  [facility] [address]        Dear Nohemi,     According to our records, you are due for a physical and pap smear to screen for cervical cancer.     Please contact the clinic at [phone] at your earliest convenience to schedule this appointment.     Thank you for choosing [facility] as your preferred healthcare.     Sincerely,     Elsa Peacock CNP, RN       oriented to person, place and time , oriented to person, place and time 62 year old male with a PMH of nonischemic cardiomyopathy/ HFrEF now s/p Heart Mate II LVAD with TV annuloplasty ring on 9/12/17. Patient was found to be hyponatremic (130) starting from 9/14/17 until today.

## 2022-09-02 NOTE — DISCHARGE NOTE PROVIDER - NSDCFUADDAPPT_GEN_ALL_CORE_FT
Assessment/plan:   Atrial fibrillation  -Stable on metoprolol and Eliquis 5 mg twice daily  2   Benign essential hypertension -stable on metoprolol 50 mg daily, no medication changes  3   Mixed hyperlipidemia -stable on simvastatin 40 mg daily  4   Hyperglycemia -stable with diet and exercise control   -Stable with hemoglobin A1c of 5 8   5   Healthcare maintenance -annual Medicare wellness visit completed  6   Cerumen impaction -lavage in office with success  See procedure note  Please be sure to follow-up with your PCP in 1 to 2 weeks to monitor the progression of your symptoms. If you do not have a primary care physician, please call your insurance company to inquire about locating a primary care provider close to you, or call [facility] Physician [facility] at [phone]. Please be sure to follow-up with your PCP in 1 to 2 weeks to monitor the progression of your symptoms. If you do not have a primary care physician, please call your insurance company to inquire about locating a primary care provider close to you, or call [facility] at [phone].    Please follow up with a Hematologist/Oncologist to further workup you protein gap Please be sure to follow-up with your PCP in 1 to 2 weeks to monitor the progression of your symptoms. If you do not have a primary care physician, please call your insurance company to inquire about locating a primary care provider close to you, or call [facility] Physician [facility] at [phone].

## 2022-09-02 NOTE — H&P ADULT - DOES THIS PATIENT HAVE A HISTORY OF OR HAS BEEN DX WITH HEART FAILURE?
Problem: Plan of Care - These are the overarching goals to be used throughout the patient stay.    Goal: Optimal Comfort and Wellbeing  Outcome: Ongoing, Progressing   Goal Outcome Evaluation:  Staff to minimize disturbance while patient having dialysis this evening. Saved meal tray to be served following procedure.                     yes

## 2022-09-12 ENCOUNTER — APPOINTMENT (OUTPATIENT)
Dept: ELECTROPHYSIOLOGY | Facility: CLINIC | Age: 67
End: 2022-09-12

## 2022-09-13 NOTE — PROGRESS NOTE ADULT - SUBJECTIVE AND OBJECTIVE BOX
RICKY HAMPTON  MRN-[id number]  Patient is a 66y old  Male who presents with a chief complaint of Anemia, Supratherapeutic INR, Dark Stools (2021 13:35)    HPI:  65M PMH ACC/AHA stage D HF due to NICM HM2 LVAD , TV annuloplasty ring 17 as destination therapy due to severe peripheral artery disease with significant stenosis  SIADH, Depression, CKD-3 with hyperkalemia, past E. coli UTIs, driveline drainage (21) and COVID-19 (back in 2020)  He was recently seen in clinic where he complained of abdominal pain and dark stools w constipation back in May. He presents to [facility] ER today weakness and fatigue, moderate and + Black stools for three days, on coumadin secondary to warfarin use in the setting of an LVAD. Patient has required transfusions for GIB in the past. Mostly recently back in 2021 pt had anemia with dark stools. No interventions was done at that time. However Last Endoscopy was done in 2020 (negative). Today labs show patient is anemic with H/H of 4.5/16.3,. INR is 8.84 MAP in the 90s, Temp 35.1. He denies any chest pain, shortness of breath, dizziness, abd pain, nausea or vomiting.       (2021 16:57)      Surgery/Hospital course:   admit for melena w/ anemia, INR 8.84   6/15 Capsul study (+) for small bowel bleed, balloon endoscopy (old blood in prox ileum); post EGD - septic w/ L opacity, re-intubated for concern for aspiration, TTE (Mod MR, decrease biV w/ interventricular septum boweing towards R)   bronch    +C Diff    TC    CT C/A/P: Fluid filled colon which may be 2/2 rapid transit. Small bilateral pleffs with associates. Compressive atelectasis New ISABELLE & LLL  parenchymal opacities, suspicious for pneumonia. Moderate stenosis in the proximal superior mesenteric artery.    #8 Shiley trach at bedside    CPAP trials    LVAD speed increased to 9200   Bronch; Central line dc'ed   TC since , continue as tolerated. Patient transferred to SDU.    Cont PT, no trach downsize today(#8cuffed)/ Anticoagulation/ Continue TF, speech f/u/ Vanco taper    oob to chair  INR  2.47  5 mg coumadin     increased lop to 25 q8  per HF   INR today 2.64.  H&H 7.3 this AM.  Will repeat CBC at noon, and will send stool guaiac Patient with persistent abdominal tenderness, rate of tube feeds decreased.  No nausea/vomiting.     INR today 2.4H&H 9.1/28.6 low flow overnight /N&V  NPO resolved for capsule study  Coumadin on hold refusing Tube feeds on D5 1/2 normal  @50 cc/hr   INR 2.69  H&H 7..1 refusing Tube feeds on D5 1/2 normal  @50 cc/hr. This am + BM Anusha Oneill HF  aware- PRBC x1  GI team consulted -  NPO  plan on study in am-  D/w Dr Cadet Patient  to return to CTU for further management; 1PRBCS    Post op INR 2.2 today.  No bleeding. BC + for SM.  Pt is hypotensive requiring pressor and inotropic support.  ID follow up today on Cefepime will follow.   R PTC for PTX     Today/Overnight:  Patient has a CT C/A/P today that showed: Trace bilateral pleural effusions, Subcutaneous emphysema in the right chest wall and right sided pigtail catheter. Consolidation in the left lower lobe. Ground glass opacities in the right upper lobe, new. Partial thrombosis of the LVAD Outflow tract, unchanged. Small ascites, increased since 2021.     Vital Signs Last 24 Hrs  T(C): 37.9 (2021 20:00), Max: 38.7 (2021 00:30)  T(F): 100.2 (2021 20:00), Max: 101.7 (2021 00:30)  HR: 120 (2021 19:45) (0 - 120)  BP: --  BP(mean): --  RR: 58 (2021 19:45) (17 - 71)  SpO2: 98% (2021 19:45) (79% - 100%)  ============================I/O===========================  I&O's Detail    2021 07:  -  2021 07:00  --------------------------------------------------------  IN:    Albumin 5%  - 250 mL: 500 mL    DOBUTamine: 23.6 mL    DOBUTamine: 123.9 mL    Enteral Tube Flush: 30 mL    Insulin: 64 mL    IV PiggyBack: 650 mL    Miscellaneous Tube Feedin mL    Norepinephrine: 215 mL    Pantoprazole: 240 mL    Propofol: 496.4 mL    sodium chloride 0.9%: 1200 mL    Vasopressin: 141 mL  Total IN: 4333.9 mL    OUT:    Chest Tube (mL): 130 mL    Dexmedetomidine: 0 mL    Indwelling Catheter - Urethral (mL): 775 mL  Total OUT: 905 mL    Total NET: 3428.9 mL      2021 07:  -  2021 19:53  --------------------------------------------------------  IN:    DOBUTamine: 76.7 mL    Enteral Tube Flush: 360 mL    Insulin: 69 mL    IV PiggyBack: 350 mL    Miscellaneous Tube Feedin mL    Norepinephrine: 14.6 mL    Pantoprazole: 130 mL    Propofol: 69.1 mL    sodium chloride 0.9%: 650 mL    Vasopressin: 56 mL  Total IN: 2505.4 mL    OUT:    Chest Tube (mL): 55 mL    Dexmedetomidine: 0 mL    Indwelling Catheter - Urethral (mL): 1320 mL  Total OUT: 1375 mL    Total NET: 1130.4 mL        ============================ LABS =========================                        7.6    14.01 )-----------( 106      ( 2021 17:13 )             23.4         129<L>  |  99  |  22  ----------------------------<  149<H>  4.0   |  19<L>  |  0.55    Ca    8.6      2021 00:32  Phos  1.9       Mg     1.8         TPro  6.2  /  Alb  3.1<L>  /  TBili  1.7<H>  /  DBili  x   /  AST  195<H>  /  ALT  157<H>  /  AlkPhos  131<H>      LIVER FUNCTIONS - ( 2021 00:32 )  Alb: 3.1 g/dL / Pro: 6.2 g/dL / ALK PHOS: 131 U/L / ALT: 157 U/L / AST: 195 U/L / GGT: x           PT/INR - ( 2021 13:17 )   PT: 15.6 sec;   INR: 1.32 ratio           ABG - ( 2021 15:02 )  pH, Arterial: 7.39  pH, Blood: x     /  pCO2: 41    /  pO2: 153   / HCO3: 25    / Base Excess: .2    /  SaO2: 100                 ======================Micro/Rad/Cardio=================  Culture: Reviewed   CXR: Reviewed  Echo: Reviewed  ======================================================  PAST MEDICAL & SURGICAL HISTORY:  CHF (congestive heart failure)    CAD (coronary artery disease)    Depression    Pleural effusion    History of 2019 novel coronavirus disease (COVID-19)  2020    Hemorrhoids    Bleeding hemorrhoids    Peripheral arterial disease    Claudication    BPH with urinary obstruction    ACC/AHA stage D systolic heart failure    Anticoagulation goal of INR 2.0 to 2.5    Falls    Clavicle fracture    CKD (chronic kidney disease), stage III    Iron deficiency anemia    H/O epistaxis    Vertigo    GI bleed    S/P TVR (tricuspid valve repair)    S/P ventricular assist device    S/P endoscopy      ========================ASSESSMENT ================  Stage D Nonischemic Cardiomyopathy, Status Post HM2 on 2017    Cardiogenic shock  Hemodynamic instability   Acute hypoxemic respiratory failure  GI bleed , Status Post Enteroscopy   Anemia, in setting of melena   Chronic Kidney Disease  Stress hyperglycemia   C.diff positive on    Hypovolemic shock  Hyponatremia   Leukocytosis    Plan:  ====================== NEUROLOGY=====================  Sedated with IV Precedex and IV Propofol, wean Propofol as tolerated   Tylenol PRN for analgesia   C/w clonazepam for agitation    acetaminophen    Suspension .. 650 milliGRAM(s) Oral every 6 hours PRN Mild Pain (1 - 3)  clonazePAM  Tablet 0.5 milliGRAM(s) Oral at bedtime  dexMEDEtomidine Infusion 1.5 MICROgram(s)/kG/Hr (29.4 mL/Hr) IV Continuous <Continuous>  propofol Infusion 25 MICROgram(s)/kG/Min (11.8 mL/Hr) IV Continuous <Continuous>    ==================== RESPIRATORY======================  Acute hypoxemic respiratory failure s/p #8 shiley trach on    CPAP/TC vent weaning as tolerated   R PTC placed on  for mod R PTX, continue to monitor output.   Patient has a CT C/A/P today that showed: Trace bilateral pleural effusions, Subcutaneous emphysema in the right chest wall and right sided pigtail catheter. Consolidation in the left lower lobe. Ground glass opacities in the right upper lobe, new. Partial thrombosis of the LVAD Outflow tract, unchanged. Small ascites, increased since 2021. On full vent support, requiring close monitoring of respiratory rate, breathing pattern, pulse oximetry monitoring, and intermittent blood gas analysis.     Mechanical Ventilation:  Mode: AC/ CMV (Assist Control/ Continuous Mandatory Ventilation)  RR (machine): 16  TV (machine): 500  FiO2: 40  PEEP: 5  ITime: 1  MAP: 14  PIP: 24      ====================CARDIOVASCULAR==================  Stage D Nonischemic Cardiomyopathy, Status Post HM2 on 2017; LVAD settings 9200 with flow of 5.1   TTE : reveals at least moderate MR, mild AI, severe global LV syst dysfxn, dec RV fxn.  Continue invasive hemodynamic monitoring   Inotropic support with IV Dobutamine, maintain MAPs 70s-80s.   Maintain pressor support with IV Vasopressin, on and off IV Levophed   Rate control with amiodarone    aMIOdarone    Tablet 200 milliGRAM(s) Oral daily  DOBUTamine Infusion 2.5 MICROgram(s)/kG/Min (5.89 mL/Hr) IV Continuous <Continuous>  norepinephrine Infusion 0.05 MICROgram(s)/kG/Min (7.36 mL/Hr) IV Continuous <Continuous>  vasopressin Infusion 0.017 Unit(s)/Min (1 mL/Hr) IV Continuous <Continuous>    ===================HEMATOLOGIC/ONC ===================  Anemia due to acute blood loss associated with upper GI bleed   Continue to monitor H&H/Plts     ===================== RENAL =========================  Continue to monitor I/Os, BUN/Creatinine, and urine output.   Goal net negative fluid balance. Replete lytes PRN. Keep K> 4 and Mg >2. Diuresis w/ Lasix dc'ed.     ==================== GASTROINTESTINAL===================  Tolerating evity 1.2 shantelle TF, @ goal 60cc/hr.   GI bleed in setting of recent melena, GI following - continue Protonix drip   1 melanotic BM overnight, continue IV Protonix drip.   C.diff + on , continue vanco   Simethicone PRN dyspepsia dc'ed    pantoprazole Infusion 8 mG/Hr (10 mL/Hr) IV Continuous <Continuous>  simethicone 80 milliGRAM(s) Chew every 8 hours PRN Dyspepsia  sodium chloride 0.9%. 1000 milliLiter(s) (50 mL/Hr) IV Continuous <Continuous>    =======================    ENDOCRINE  =====================  Stress hyperglycemia, continue glucose control with IV insulin drip     insulin regular Infusion 3 Unit(s)/Hr (3 mL/Hr) IV Continuous <Continuous>    ========================INFECTIOUS DISEASE================  Febrile TMAX: 101/7F, WBC downtrending 17.87-> 14.01  Monitor temperature and trend WBC   BCx+  +Serratia marcescens c/w meropenem   Plan for RUQ sono to eval for possible source of infection  C.diff + on , continue vanco   F/u repeat cultures     meropenem  IVPB 1000 milliGRAM(s) IV Intermittent every 8 hours  meropenem  IVPB      vancomycin    Solution 125 milliGRAM(s) Oral daily      Patient requires continuous monitoring with bedside rhythm monitoring, pulse oximetry monitoring, and continuous central venous and arterial pressure monitoring; and intermittent blood gas analysis.  Care plan discussed with ICU care team.    Patient remained critical, at risk for life threatening decompensation.   I have spent 35 minutes providing acute care with multiple re-evaluations throughout the evening.     By signing my name below, I, Daniela Chowdary, attest that this documentation has been prepared under the direction and in the presence of CLAUDIA Lee.  Electronically signed: Cesia Suresh, 21 @ 19:53    I, CLAUDIA Lee, personally performed the services described in this documentation. All medical record entries made by the scribe were at my direction and in my presence. I have reviewed the chart and agree that the record reflects my personal performance and is accurate and complete  Electronically signed: CLAUDIA Lee, 21 @ 19:53       RICKY HAMPTON  MRN-[id number]  Patient is a 66y old  Male who presents with a chief complaint of Anemia, Supratherapeutic INR, Dark Stools (2021 13:35)    HPI:  65M PMH ACC/AHA stage D HF due to NICM HM2 LVAD , TV annuloplasty ring 17 as destination therapy due to severe peripheral artery disease with significant stenosis  SIADH, Depression, CKD-3 with hyperkalemia, past E. coli UTIs, driveline drainage (21) and COVID-19 (back in 2020)  He was recently seen in clinic where he complained of abdominal pain and dark stools w constipation back in May. He presents to [facility] ER today weakness and fatigue, moderate and + Black stools for three days, on coumadin secondary to warfarin use in the setting of an LVAD. Patient has required transfusions for GIB in the past. Mostly recently back in 2021 pt had anemia with dark stools. No interventions was done at that time. However Last Endoscopy was done in 2020 (negative). Today labs show patient is anemic with H/H of 4.5/16.3,. INR is 8.84 MAP in the 90s, Temp 35.1. He denies any chest pain, shortness of breath, dizziness, abd pain, nausea or vomiting.       (2021 16:57)      Surgery/Hospital course:   admit for melena w/ anemia, INR 8.84   6/15 Capsul study (+) for small bowel bleed, balloon endoscopy (old blood in prox ileum); post EGD - septic w/ L opacity, re-intubated for concern for aspiration, TTE (Mod MR, decrease biV w/ interventricular septum boweing towards R)   bronch    +C Diff    TC    CT C/A/P: Fluid filled colon which may be 2/2 rapid transit. Small bilateral pleffs with associates. Compressive atelectasis New ISABELLE & LLL  parenchymal opacities, suspicious for pneumonia. Moderate stenosis in the proximal superior mesenteric artery.    #8 Shiley trach at bedside    CPAP trials    LVAD speed increased to 9200   Bronch; Central line dc'ed   TC since , continue as tolerated. Patient transferred to SDU.    Cont PT, no trach downsize today(#8cuffed)/ Anticoagulation/ Continue TF, speech f/u/ Vanco taper    oob to chair  INR  2.47  5 mg coumadin     increased lop to 25 q8  per HF   INR today 2.64.  H&H 7.3 this AM.  Will repeat CBC at noon, and will send stool guaiac Patient with persistent abdominal tenderness, rate of tube feeds decreased.  No nausea/vomiting.     INR today 2.4H&H 9.1/28.6 low flow overnight /N&V  NPO resolved for capsule study  Coumadin on hold refusing Tube feeds on D5 1/2 normal  @50 cc/hr   INR 2.69  H&H 7..1 refusing Tube feeds on D5 1/2 normal  @50 cc/hr. This am + BM Anusha Oneill HF  aware- PRBC x1  GI team consulted -  NPO  plan on study in am-  D/w Dr Cadet Patient  to return to CTU for further management; 1PRBCS    Post op INR 2.2 today.  No bleeding. BC + for SM.  Pt is hypotensive requiring pressor and inotropic support.  ID follow up today on Cefepime will follow.   R PTC for PTX     Today/Overnight:  Patient has a CT C/A/P today that showed: Trace bilateral pleural effusions, Subcutaneous emphysema in the right chest wall and right sided pigtail catheter. Consolidation in the left lower lobe. Ground glass opacities in the right upper lobe, new. Partial thrombosis of the LVAD Outflow tract, unchanged. Small ascites, increased since 2021.     Vital Signs Last 24 Hrs  T(C): 37.9 (2021 20:00), Max: 38.7 (2021 00:30)  T(F): 100.2 (2021 20:00), Max: 101.7 (2021 00:30)  HR: 120 (2021 19:45) (0 - 120)  BP: --  BP(mean): --  RR: 58 (2021 19:45) (17 - 71)  SpO2: 98% (2021 19:45) (79% - 100%)  ============================I/O===========================  I&O's Detail    2021 07:  -  2021 07:00  --------------------------------------------------------  IN:    Albumin 5%  - 250 mL: 500 mL    DOBUTamine: 23.6 mL    DOBUTamine: 123.9 mL    Enteral Tube Flush: 30 mL    Insulin: 64 mL    IV PiggyBack: 650 mL    Miscellaneous Tube Feedin mL    Norepinephrine: 215 mL    Pantoprazole: 240 mL    Propofol: 496.4 mL    sodium chloride 0.9%: 1200 mL    Vasopressin: 141 mL  Total IN: 4333.9 mL    OUT:    Chest Tube (mL): 130 mL    Dexmedetomidine: 0 mL    Indwelling Catheter - Urethral (mL): 775 mL  Total OUT: 905 mL    Total NET: 3428.9 mL      2021 07:  -  2021 19:53  --------------------------------------------------------  IN:    DOBUTamine: 76.7 mL    Enteral Tube Flush: 360 mL    Insulin: 69 mL    IV PiggyBack: 350 mL    Miscellaneous Tube Feedin mL    Norepinephrine: 14.6 mL    Pantoprazole: 130 mL    Propofol: 69.1 mL    sodium chloride 0.9%: 650 mL    Vasopressin: 56 mL  Total IN: 2505.4 mL    OUT:    Chest Tube (mL): 55 mL    Dexmedetomidine: 0 mL    Indwelling Catheter - Urethral (mL): 1320 mL  Total OUT: 1375 mL    Total NET: 1130.4 mL        ============================ LABS =========================                        7.6    14.01 )-----------( 106      ( 2021 17:13 )             23.4         129<L>  |  99  |  22  ----------------------------<  149<H>  4.0   |  19<L>  |  0.55    Ca    8.6      2021 00:32  Phos  1.9       Mg     1.8         TPro  6.2  /  Alb  3.1<L>  /  TBili  1.7<H>  /  DBili  x   /  AST  195<H>  /  ALT  157<H>  /  AlkPhos  131<H>      LIVER FUNCTIONS - ( 2021 00:32 )  Alb: 3.1 g/dL / Pro: 6.2 g/dL / ALK PHOS: 131 U/L / ALT: 157 U/L / AST: 195 U/L / GGT: x           PT/INR - ( 2021 13:17 )   PT: 15.6 sec;   INR: 1.32 ratio           ABG - ( 2021 15:02 )  pH, Arterial: 7.39  pH, Blood: x     /  pCO2: 41    /  pO2: 153   / HCO3: 25    / Base Excess: .2    /  SaO2: 100                 ======================Micro/Rad/Cardio=================  Culture: Reviewed   CXR: Reviewed  Echo: Reviewed  ======================================================  PAST MEDICAL & SURGICAL HISTORY:  CHF (congestive heart failure)    CAD (coronary artery disease)    Depression    Pleural effusion    History of 2019 novel coronavirus disease (COVID-19)  2020    Hemorrhoids    Bleeding hemorrhoids    Peripheral arterial disease    Claudication    BPH with urinary obstruction    ACC/AHA stage D systolic heart failure    Anticoagulation goal of INR 2.0 to 2.5    Falls    Clavicle fracture    CKD (chronic kidney disease), stage III    Iron deficiency anemia    H/O epistaxis    Vertigo    GI bleed    S/P TVR (tricuspid valve repair)    S/P ventricular assist device    S/P endoscopy      ========================ASSESSMENT ================  Stage D Nonischemic Cardiomyopathy, Status Post HM2 on 2017    Cardiogenic shock  Hemodynamic instability   Acute hypoxemic respiratory failure  GI bleed , Status Post Enteroscopy   Anemia, in setting of melena   Chronic Kidney Disease  Stress hyperglycemia   C.diff positive on    Hypovolemic shock  Hyponatremia   Leukocytosis    Plan:  ====================== NEUROLOGY=====================  Sedated with IV Precedex and IV Propofol, wean Propofol as tolerated   Tylenol PRN for analgesia   C/w clonazepam for agitation    acetaminophen    Suspension .. 650 milliGRAM(s) Oral every 6 hours PRN Mild Pain (1 - 3)  clonazePAM  Tablet 0.5 milliGRAM(s) Oral at bedtime  dexMEDEtomidine Infusion 1.5 MICROgram(s)/kG/Hr (29.4 mL/Hr) IV Continuous <Continuous>  propofol Infusion 25 MICROgram(s)/kG/Min (11.8 mL/Hr) IV Continuous <Continuous>    ==================== RESPIRATORY======================  Acute hypoxemic respiratory failure s/p #8 shiley trach on    CPAP/TC vent weaning as tolerated   R PTC placed on  for mod R PTX, continue to monitor output.   Patient has a CT C/A/P today that showed: Trace bilateral pleural effusions, Subcutaneous emphysema in the right chest wall and right sided pigtail catheter. Consolidation in the left lower lobe. Ground glass opacities in the right upper lobe, new. Partial thrombosis of the LVAD Outflow tract, unchanged. Small ascites, increased since 2021. On full vent support, requiring close monitoring of respiratory rate, breathing pattern, pulse oximetry monitoring, and intermittent blood gas analysis.     Mechanical Ventilation:  Mode: AC/ CMV (Assist Control/ Continuous Mandatory Ventilation)  RR (machine): 16  TV (machine): 500  FiO2: 40  PEEP: 5  ITime: 1  MAP: 14  PIP: 24      ====================CARDIOVASCULAR==================  Stage D Nonischemic Cardiomyopathy, Status Post HM2 on 2017; LVAD settings 9200 with flow of 5.1   TTE : reveals at least moderate MR, mild AI, severe global LV syst dysfxn, dec RV fxn.  LVAD settings: speed of 9200 and flow of 5.5  Continue invasive hemodynamic monitoring   Inotropic support with IV Dobutamine, maintain MAPs 70s-80s.   Maintain pressor support with IV Vasopressin, on and off IV Levophed   Rate control with amiodarone    aMIOdarone    Tablet 200 milliGRAM(s) Oral daily  DOBUTamine Infusion 2.5 MICROgram(s)/kG/Min (5.89 mL/Hr) IV Continuous <Continuous>  norepinephrine Infusion 0.05 MICROgram(s)/kG/Min (7.36 mL/Hr) IV Continuous <Continuous>  vasopressin Infusion 0.017 Unit(s)/Min (1 mL/Hr) IV Continuous <Continuous>    ===================HEMATOLOGIC/ONC ===================  Anemia due to acute blood loss associated with upper GI bleed   Continue to monitor H&H/Plts     ===================== RENAL =========================  Continue to monitor I/Os, BUN/Creatinine, and urine output.   Goal net negative fluid balance. Replete lytes PRN. Keep K> 4 and Mg >2. Diuresis w/ Lasix dc'ed.     ==================== GASTROINTESTINAL===================  Tolerating evity 1.2 shantelle TF, @ goal 60cc/hr.   GI bleed in setting of recent melena, GI following - continue Protonix drip   1 melanotic BM overnight, continue IV Protonix drip.   C.diff + on , continue vanco   Simethicone PRN dyspepsia dc'ed    pantoprazole Infusion 8 mG/Hr (10 mL/Hr) IV Continuous <Continuous>  simethicone 80 milliGRAM(s) Chew every 8 hours PRN Dyspepsia  sodium chloride 0.9%. 1000 milliLiter(s) (50 mL/Hr) IV Continuous <Continuous>    =======================    ENDOCRINE  =====================  Stress hyperglycemia, continue glucose control with IV insulin drip     insulin regular Infusion 3 Unit(s)/Hr (3 mL/Hr) IV Continuous <Continuous>    ========================INFECTIOUS DISEASE================  Febrile TMAX: 101/7F, WBC downtrending 17.87-> 14.01  Monitor temperature and trend WBC   BCx+  +Serratia marcescens c/w meropenem   Plan for RUQ sono to eval for possible source of infection  C.diff + on , continue vanco   F/u repeat cultures     meropenem  IVPB 1000 milliGRAM(s) IV Intermittent every 8 hours  meropenem  IVPB      vancomycin    Solution 125 milliGRAM(s) Oral daily      Patient requires continuous monitoring with bedside rhythm monitoring, pulse oximetry monitoring, and continuous central venous and arterial pressure monitoring; and intermittent blood gas analysis.  Care plan discussed with ICU care team.    Patient remained critical, at risk for life threatening decompensation.   I have spent 35 minutes providing acute care with multiple re-evaluations throughout the evening.     By signing my name below, I, Daniela Chowdary, attest that this documentation has been prepared under the direction and in the presence of CLAUDIA Lee.  Electronically signed: Cesia Suresh, 21 @ 19:53    I, CLAUDIA Lee, personally performed the services described in this documentation. All medical record entries made by the scribe were at my direction and in my presence. I have reviewed the chart and agree that the record reflects my personal performance and is accurate and complete  Electronically signed: CLAUDIA Lee, 21 @ 19:53       RICKY HAMPTON  MRN-[id number]  Patient is a 66y old  Male who presents with a chief complaint of Anemia, Supratherapeutic INR, Dark Stools (2021 13:35)    HPI:  65M PMH ACC/AHA stage D HF due to NICM HM2 LVAD , TV annuloplasty ring 17 as destination therapy due to severe peripheral artery disease with significant stenosis  SIADH, Depression, CKD-3 with hyperkalemia, past E. coli UTIs, driveline drainage (21) and COVID-19 (back in 2020)  He was recently seen in clinic where he complained of abdominal pain and dark stools w constipation back in May. He presents to [facility] ER today weakness and fatigue, moderate and + Black stools for three days, on coumadin secondary to warfarin use in the setting of an LVAD. Patient has required transfusions for GIB in the past. Mostly recently back in 2021 pt had anemia with dark stools. No interventions was done at that time. However Last Endoscopy was done in 2020 (negative). Today labs show patient is anemic with H/H of 4.5/16.3,. INR is 8.84 MAP in the 90s, Temp 35.1. He denies any chest pain, shortness of breath, dizziness, abd pain, nausea or vomiting.       (2021 16:57)      Surgery/Hospital course:   admit for melena w/ anemia, INR 8.84   6/15 Capsul study (+) for small bowel bleed, balloon endoscopy (old blood in prox ileum); post EGD - septic w/ L opacity, re-intubated for concern for aspiration, TTE (Mod MR, decrease biV w/ interventricular septum boweing towards R)   bronch    +C Diff    TC    CT C/A/P: Fluid filled colon which may be 2/2 rapid transit. Small bilateral pleffs with associates. Compressive atelectasis New ISABELLE & LLL  parenchymal opacities, suspicious for pneumonia. Moderate stenosis in the proximal superior mesenteric artery.    #8 Shiley trach at bedside    CPAP trials    LVAD speed increased to 9200   Bronch; Central line dc'ed   TC since , continue as tolerated. Patient transferred to SDU.    Cont PT, no trach downsize today(#8cuffed)/ Anticoagulation/ Continue TF, speech f/u/ Vanco taper    oob to chair  INR  2.47  5 mg coumadin     increased lop to 25 q8  per HF   INR today 2.64.  H&H 7.3 this AM.  Will repeat CBC at noon, and will send stool guaiac Patient with persistent abdominal tenderness, rate of tube feeds decreased.  No nausea/vomiting.     INR today 2.4H&H 9.1/28.6 low flow overnight /N&V  NPO resolved for capsule study  Coumadin on hold refusing Tube feeds on D5 1/2 normal  @50 cc/hr   INR 2.69  H&H 7..1 refusing Tube feeds on D5 1/2 normal  @50 cc/hr. This am + BM Anusha Oneill HF  aware- PRBC x1  GI team consulted -  NPO  plan on study in am-  D/w Dr Cadet Patient  to return to CTU for further management; 1PRBCS    Post op INR 2.2 today.  No bleeding. BC + for SM.  Pt is hypotensive requiring pressor and inotropic support.  ID follow up today on Cefepime will follow.   R PTC for PTX     Today/Overnight:  Patient has a CT C/A/P today that showed: Trace bilateral pleural effusions, Subcutaneous emphysema in the right chest wall and right sided pigtail catheter. Consolidation in the left lower lobe. Ground glass opacities in the right upper lobe, new. 1prbc given overnight.     Vital Signs Last 24 Hrs  T(C): 37.9 (2021 20:00), Max: 38.7 (2021 00:30)  T(F): 100.2 (2021 20:00), Max: 101.7 (2021 00:30)  HR: 120 (2021 19:45) (0 - 120)  BP: --  BP(mean): --  RR: 58 (2021 19:45) (17 - 71)  SpO2: 98% (2021 19:45) (79% - 100%)  ============================I/O===========================  I&O's Detail    2021 07:  -  2021 07:00  --------------------------------------------------------  IN:    Albumin 5%  - 250 mL: 500 mL    DOBUTamine: 23.6 mL    DOBUTamine: 123.9 mL    Enteral Tube Flush: 30 mL    Insulin: 64 mL    IV PiggyBack: 650 mL    Miscellaneous Tube Feedin mL    Norepinephrine: 215 mL    Pantoprazole: 240 mL    Propofol: 496.4 mL    sodium chloride 0.9%: 1200 mL    Vasopressin: 141 mL  Total IN: 4333.9 mL    OUT:    Chest Tube (mL): 130 mL    Dexmedetomidine: 0 mL    Indwelling Catheter - Urethral (mL): 775 mL  Total OUT: 905 mL    Total NET: 3428.9 mL      2021 07:01  -  2021 19:53  --------------------------------------------------------  IN:    DOBUTamine: 76.7 mL    Enteral Tube Flush: 360 mL    Insulin: 69 mL    IV PiggyBack: 350 mL    Miscellaneous Tube Feedin mL    Norepinephrine: 14.6 mL    Pantoprazole: 130 mL    Propofol: 69.1 mL    sodium chloride 0.9%: 650 mL    Vasopressin: 56 mL  Total IN: 2505.4 mL    OUT:    Chest Tube (mL): 55 mL    Dexmedetomidine: 0 mL    Indwelling Catheter - Urethral (mL): 1320 mL  Total OUT: 1375 mL    Total NET: 1130.4 mL        ============================ LABS =========================                        7.6    14.01 )-----------( 106      ( 2021 17:13 )             23.4         129<L>  |  99  |  22  ----------------------------<  149<H>  4.0   |  19<L>  |  0.55    Ca    8.6      2021 00:32  Phos  1.9       Mg     1.8         TPro  6.2  /  Alb  3.1<L>  /  TBili  1.7<H>  /  DBili  x   /  AST  195<H>  /  ALT  157<H>  /  AlkPhos  131<H>      LIVER FUNCTIONS - ( 2021 00:32 )  Alb: 3.1 g/dL / Pro: 6.2 g/dL / ALK PHOS: 131 U/L / ALT: 157 U/L / AST: 195 U/L / GGT: x           PT/INR - ( 2021 13:17 )   PT: 15.6 sec;   INR: 1.32 ratio           ABG - ( 2021 15:02 )  pH, Arterial: 7.39  pH, Blood: x     /  pCO2: 41    /  pO2: 153   / HCO3: 25    / Base Excess: .2    /  SaO2: 100                 ======================Micro/Rad/Cardio=================  Culture: Reviewed   CXR: Reviewed  Echo: Reviewed  ======================================================  PAST MEDICAL & SURGICAL HISTORY:  CHF (congestive heart failure)    CAD (coronary artery disease)    Depression    Pleural effusion    History of 2019 novel coronavirus disease (COVID-19)  2020    Hemorrhoids    Bleeding hemorrhoids    Peripheral arterial disease    Claudication    BPH with urinary obstruction    ACC/AHA stage D systolic heart failure    Anticoagulation goal of INR 2.0 to 2.5    Falls    Clavicle fracture    CKD (chronic kidney disease), stage III    Iron deficiency anemia    H/O epistaxis    Vertigo    GI bleed    S/P TVR (tricuspid valve repair)    S/P ventricular assist device    S/P endoscopy      ========================ASSESSMENT ================  Stage D Nonischemic Cardiomyopathy, Status Post HM2 on 2017    Cardiogenic shock  Hemodynamic instability   Acute hypoxemic respiratory failure  GI bleed , Status Post Enteroscopy   Anemia, in setting of melena   Chronic Kidney Disease  Stress hyperglycemia   C.diff positive on    Hypovolemic shock  Hyponatremia   Leukocytosis  Right Pneumothorax     Plan:  ====================== NEUROLOGY=====================  off all sedation   Tylenol PRN for analgesia   C/w clonazepam for agitation    acetaminophen    Suspension .. 650 milliGRAM(s) Oral every 6 hours PRN Mild Pain (1 - 3)  clonazePAM  Tablet 0.5 milliGRAM(s) Oral at bedtime  dexMEDEtomidine Infusion 1.5 MICROgram(s)/kG/Hr (29.4 mL/Hr) IV Continuous <Continuous>  propofol Infusion 25 MICROgram(s)/kG/Min (11.8 mL/Hr) IV Continuous <Continuous>    ==================== RESPIRATORY======================  Acute hypoxemic respiratory failure s/p #8 shiley trach on    R PTC placed on  for mod R PTX.   Patient has a CT C/A/P today that showed: Trace bilateral pleural effusions, Subcutaneous emphysema in the right chest wall and right sided pigtail catheter. Consolidation in the left lower lobe. Ground glass opacities in the right upper lobe, new. Partial thrombosis of the LVAD Outflow tract, unchanged. Small ascites, increased since 2021.  On full vent support, requiring close monitoring of respiratory rate, breathing pattern, pulse oximetry monitoring, and intermittent blood gas analysis.     Mechanical Ventilation:  Mode: AC/ CMV (Assist Control/ Continuous Mandatory Ventilation)  RR (machine): 16  TV (machine): 500  FiO2: 40  PEEP: 5  ITime: 1  MAP: 14  PIP: 24      ====================CARDIOVASCULAR==================  Stage D Nonischemic Cardiomyopathy, Status Post HM2 on 2017; LVAD settings 9200 with flow of 5.1   TTE : reveals at least moderate MR, mild AI, severe global LV syst dysfxn, dec RV fxn.  LVAD settings: speed of 9200 and flow of 5.5  Continue invasive hemodynamic monitoring   Inotropic support with IV Dobutamine, maintain MAPs 70s-80s.   Maintain pressor support with IV Vasopressin, wean as tolerated   Rate control with amiodarone    aMIOdarone    Tablet 200 milliGRAM(s) Oral daily  DOBUTamine Infusion 2.5 MICROgram(s)/kG/Min (5.89 mL/Hr) IV Continuous <Continuous>  norepinephrine Infusion 0.05 MICROgram(s)/kG/Min (7.36 mL/Hr) IV Continuous <Continuous>  vasopressin Infusion 0.017 Unit(s)/Min (1 mL/Hr) IV Continuous <Continuous>    ===================HEMATOLOGIC/ONC ===================  Anemia due to acute blood loss associated with upper GI bleed. 1prbc given on  overnight    Continue to monitor H&H/Plts     ===================== RENAL =========================  Continue to monitor I/Os, BUN/Creatinine, and urine output.   Goal net negative fluid balance. Replete lytes PRN. Keep K> 4 and Mg >2     ==================== GASTROINTESTINAL===================  Tolerating evity 1.2 shantelle TF, @ goal 60cc/hr.   GI bleed in setting of recent melena, GI following - continue Protonix drip   mulitple melanotic BM today, continue IV Protonix drip.   C.diff + on , continue vanco   Simethicone PRN dyspepsia dc'ed    ABD u/s on : GB thickening and pericholecystic fluid     pantoprazole Infusion 8 mG/Hr (10 mL/Hr) IV Continuous <Continuous>  simethicone 80 milliGRAM(s) Chew every 8 hours PRN Dyspepsia  sodium chloride 0.9%. 1000 milliLiter(s) (50 mL/Hr) IV Continuous <Continuous>    =======================    ENDOCRINE  =====================  Stress hyperglycemia, continue glucose control with IV insulin drip     insulin regular Infusion 3 Unit(s)/Hr (3 mL/Hr) IV Continuous <Continuous>    ========================INFECTIOUS DISEASE================  Febrile TMAX: 101/7F, WBC downtrending 17.87-> 14.01  Monitor temperature and trend WBC   BCx+  +Serratia marcescens c/w meropenem, BC  NG    C.diff + on , continue vanco       meropenem  IVPB 1000 milliGRAM(s) IV Intermittent every 8 hours  meropenem  IVPB      vancomycin    Solution 125 milliGRAM(s) Oral daily      Patient requires continuous monitoring with bedside rhythm monitoring, pulse oximetry monitoring, and continuous central venous and arterial pressure monitoring; and intermittent blood gas analysis.  Care plan discussed with ICU care team.    Patient remained critical, at risk for life threatening decompensation.   I have spent 35 minutes providing acute care with multiple re-evaluations throughout the evening.     By signing my name below, I, Daniela Chowdary, attest that this documentation has been prepared under the direction and in the presence of CLAUDIA Lee.  Electronically signed: Cesia Suresh, 21 @ 19:53    I, CLAUDIA Lee, personally performed the services described in this documentation. All medical record entries made by the luisibe were at my direction and in my presence. I have reviewed the chart and agree that the record reflects my personal performance and is accurate and complete  Electronically signed: CLAUDIA Lee, 21 @ 19:53       Simple / Intermediate / Complex Repair - Final Wound Length In Cm: 2.1

## 2022-10-03 NOTE — PROGRESS NOTE ADULT - REASON FOR ADMISSION
Identified pt with two pt identifiers. Reviewed record in preparation for visit and have obtained necessary documentation. All patient medications has been reviewed. Chief Complaint   Patient presents with    Annual Wellness Visit     Additional information about chief complaint:    Visit Vitals  /76 (BP 1 Location: Right upper arm, BP Patient Position: Sitting, BP Cuff Size: Adult)   Pulse 79   Temp 97.8 °F (36.6 °C) (Temporal)   Ht 6' 3\" (1.905 m)   Wt 130 lb (59 kg)   SpO2 97%   BMI 16.25 kg/m²       Health Maintenance Due   Topic    Hepatitis C Screening     COVID-19 Vaccine (1)    Depression Monitoring     Flu Vaccine (1)       1. Have you been to the ER, urgent care clinic since your last visit? Hospitalized since your last visit? No    2. Have you seen or consulted any other health care providers outside of the [address] [place] [address] since your last visit? Include any pap smears or colon screening.  No Anemia, Supratherapeutic INR, Dark Stools

## 2022-10-03 NOTE — CONSULT NOTE ADULT - ASSESSMENT
65 YO M with a history of ACC/AHA stage D HF due to NICM s/p HM2 LVAD on 9/12/17 as destination therapy (severe PAD) who was admitted with 4/2 with weakness, chills, and abdominal pain and found to have COVID-19 infection. Neurology consulted for syncope, AMS.     Recs(incomplete):  Further recs pending head CT 63 YO M with a history of ACC/AHA stage D HF due to NICM s/p HM2 LVAD on 9/12/17 as destination therapy (severe PAD) who was admitted with 4/2 with weakness, chills, and abdominal pain and found to have COVID-19 infection. Neurology consulted for syncope, AMS. CT report from [facility] unclear if brain parenchymal changes at this time; would recommend repeat head CT w/o contrast before formal recommendations made w/ regards to syncope and AMS.     Recs(incomplete):  Further recs pending head CT 65 YO M with a history of ACC/AHA stage D HF due to NICM s/p HM2 LVAD on 9/12/17 as destination therapy (severe PAD) who was admitted with 4/2 with weakness, chills, and abdominal pain and found to have COVID-19 infection. Neurology consulted for syncope, AMS. CT report from [facility] unclear if brain parenchymal changes at this time; would recommend repeat head CT w/o contrast before formal recommendations made w/ regards to syncope and AMS.     Recs(incomplete):  Further recs pending head CT    Addendum:  CT head w/o contrast reviewed; patient with soft tissue hematoma, no sign of bleeding/hemorrhage/mass effect intraparenchymally to my eye. AMS, syncope likely 2/2 toxic metabolic dysfunction 2/2 multiple toxic/metabolic risk factors for dysfunction. Would recommend follow up outpatient for completion of neurology workup.     Recs:  No acute inpatient neurologic intervention at this time  Follow up outpatient with Dr. Michael Nissenbaum([phone]) for further workup and management Yes

## 2022-10-06 NOTE — PROGRESS NOTE ADULT - NSTELEHEALTH_GEN_ALL_CORE
No
Hydroxyzine Pregnancy And Lactation Text: This medication is not safe during pregnancy and should not be taken. It is also excreted in breast milk and breast feeding isn't recommended.

## 2022-10-10 NOTE — PROGRESS NOTE ADULT - ASSESSMENT
Called Melva to follow-up - prednisone was increased to 20 mg daily last week, plan to reduce to 15 mg after 2 weeks.  Today reports breathing has improved with higher dose.      Unsure why readings went so low Sunday morning - she did skip her snack the night before.  She did have a snack last night 10/9.  Dosing NPH 40 units every morning.  Has been holding glipizide.      Plan:  Will increase dose of NPH to 44 units every morning. Continue with healthy snack before bed.           ASSESSMENT:  64/M with PMH CHF s/p LVAD Sept 2017 (on Warfarin), CAD, s/p TVR, SIADH, Depression, CKD-3, past E. coli UTIs  P/w generalized weakness since 4 days; chills since 3 days; non-specific abdominal pain and constipation since 2 days and loose stools today  H/o sick contacts with Covid-19 at home. Imaging concerning for new opacities in RUL, Covid-19 PCR pending.  =========  Positive for Covid-19 infection  - pt denied cough, upper resp symptoms, fevers. Appears comfortable on RA, able to speak full sentences without dyspnea  - However, he has sick contacts with Covid-19 at home and imaging findings of RUL opacities  -  continue isolation precautions  for Covid-19 infection  - Covid-19 Labs - ; Procalcitonin 0.45 elevated ferritin and CRP    RECOMMENDATIONS:  - can discontinue the Cefepime and Flagyl  - continue Contact and Airborne isolation precautions  - continue course of plaquenil    Morris Prater MD  [phone]  After 5pm/weekends [phone]

## 2022-12-12 NOTE — PROGRESS NOTE ADULT - ATTENDING COMMENTS
PATIENT INFORMATION    Anticipatory guidance discussed  Bicycle helmets  Drugs, ETOH, and tobacco  Importance of regular dental care  Importance of regular exercise  Importance of varied diet  Limit TV, media violence  Minimize junk food  Puberty  Safe storage of any firearms in the home  Seat belts  Sex; STD and pregnancy prevention    Follow-Up  - Return for your yearly well child visit.    13 years old Health and Safety Tips - The following hyperlinks are available to access via [employer]    Parent Education from [employer]    Educación para padres sobre niños sanos    Additional Educational Resources:  For additional resources regarding your symptoms, diagnosis, or further health information, please visit the Discover a Healthier You section on [email or web address] or the Online Health Resources section in [employer].     Gyne >12yo:    Dr. Hoa Cosby ( [facility]) Jennifer (>14yo)    Dr. Dulce An () [facility]   [phone]    Pediatric Neurologists:    [facility]:  [phone]  [facility]:  [address]  Dr. Medina Macias ( [place])  Dr. Aubree Adler ([place])      Pediatric Developmental Specialists:    [facility]  #[phone]  Dr Rufus Araya, Dr. [facility]  #[email or web address] ([phone])  Dr Lucero Gmóez, Dr Perla Moise, Dr Mary Orr    Neuropsychological Evaluation:    [facility] ([place])  #[phone]    [facility] ([facility])  #[phone]       Pediatric Developmental Specialists:    [facility]  #[phone]  Dr Rufus Araya, Dr. [facility]  #[email or web address] ([phone])  Dr Lucero Gómez, Dr Perla Moise, Dr Mary Orr    Neuropsychological Evaluation:    [facility] ([place])  #[phone]       Pediatric Therapies:      [facility]  #[phone]    [facility]  #[phone]     Daily Acne Care:    1. Wash face AM and PM with benzoyl peroxide    (ex. differin cream / face wash)    2. Spot treatment for pimples/ breakouts use script    Clindaymycin Gel 1%     3 At night use a chemical exfoliant.   ex. differin gel.    Apply a small pea size of gel to areas of acne  (every other night or every night if skin is not irritated)                         66 yo man with a history of Stage D HF s/p VAD placement ,history of COVID infection in April 2020, history of a prolonged hospitalization in 2021 with recurrent sepsis, pneumonia, intubated/trach x2 cycles , chronic gall bladder disease s/p perc dawn, SMA ischemia requiring a stent placement, cachexia who was discharged to rehab but presented from [facility] on 12/13 with AMS, hypotension, tachycardia found to be COVID (+) + serratia bacteremia. Initially admitted to ICU requiring pressor support, now on floors. Stable pending [facility] acceptance.     Serratia Bacteremia, source unclear, LVAD not compatible with MRI and GI feels unlikely to be a GI source  On Cefepime since 12/13 will transition to 2 weeks of quinolone on d/c  Leukocytosis unclear etiology, possibly reactive due to dexamethasone, however last dose 12/22, currently 13- CTM, no signs of new infection at this time  COVID19 s/p monoclonal ab, Remdesivir and Dexamethasone currently on room air, no longer requires precautions - will discuss vaccination prior to discharge  HF: intermittent episodes of hypotension. Given poor po intake likely hypovolemic - fluids cautiously prn  Intractable Hiccups - on Baclofen  Amiodarone Toxicity - on Methimazole  Full code - palliative following regarding GOC  C diff - on PO Vancomycin  DC planning - still COVID positive, will reswab this weekend

## 2022-12-14 NOTE — ED PROVIDER NOTE - OBJECTIVE STATEMENT
[facility] Division of Hospital Medicine  Lamberto Plaza MD  Pager #[phone]    Patient is a 34y old  Male who presents with a chief complaint of Monitor for cauda equina syndrome (13 Dec 2022 11:16)    SUBJECTIVE / OVERNIGHT EVENTS: No acute events. Feels comfortable. No significant back pain. Continued b/l LE weakness. No nausea, vomiting, chest pain, abdominal pain, fever, chills. Tolerating PO diet without issue.     MEDICATIONS  (STANDING):  bictegravir 50 mG/emtricitabine 200 mG/tenofovir alafenamide 25 mG (BIKTARVY) 1 Tablet(s) Oral daily  cyanocobalamin 1000 MICROGram(s) Oral daily  enoxaparin Injectable 40 milliGRAM(s) SubCutaneous every 24 hours  fluticasone propionate 50 MICROgram(s)/spray Nasal Spray 1 Spray(s) Both Nostrils two times a day  influenza   Vaccine 0.5 milliLiter(s) IntraMuscular once  magnesium oxide 400 milliGRAM(s) Oral three times a day with meals  nicotine -  14 mG/24Hr(s) Patch 1 Patch Transdermal daily  pregabalin 50 milliGRAM(s) Oral three times a day  QUEtiapine 100 milliGRAM(s) Oral daily  QUEtiapine 300 milliGRAM(s) Oral at bedtime  trimethoprim  160 mG/sulfamethoxazole 800 mG 1 Tablet(s) Oral <User Schedule>    MEDICATIONS  (PRN):  acetaminophen     Tablet .. 650 milliGRAM(s) Oral every 6 hours PRN Temp greater or equal to 38C (100.4F), Mild Pain (1 - 3)  baclofen 5 milliGRAM(s) Oral every 8 hours PRN Muscle Spasm or pain  melatonin 3 milliGRAM(s) Oral at bedtime PRN Insomnia    CAPILLARY BLOOD GLUCOSE    I&O's Summary    PHYSICAL EXAM:  Vital Signs Last 24 Hrs  T(C): 36.4 (14 Dec 2022 12:40), Max: 36.7 (13 Dec 2022 20:32)  T(F): 97.6 (14 Dec 2022 12:40), Max: 98.1 (13 Dec 2022 20:32)  HR: 96 (14 Dec 2022 12:40) (88 - 96)  BP: 120/77 (14 Dec 2022 12:40) (120/77 - 139/80)  BP(mean): --  RR: 18 (14 Dec 2022 12:40) (17 - 19)  SpO2: 100% (14 Dec 2022 12:40) (100% - 100%)    Parameters below as of 14 Dec 2022 12:40  Patient On (Oxygen Delivery Method): room air    CONSTITUTIONAL: NAD, well-developed, laying in bed  EYES: conjunctiva and sclera clear  ENMT: Moist oral mucosa  RESPIRATORY: Normal respiratory effort; lungs are clear to auscultation bilaterally  CARDIOVASCULAR: Regular rate and rhythm, normal S1 and S2, No lower extremity edema; Peripheral pulses are 2+ bilaterally  ABDOMEN: Nontender to palpation, normoactive bowel sounds, no rebound/guarding  PSYCH: calm, affect appropriate  NEURO: 3/5 LLE strength, 4/5 RLE strength, intact sensation to light touch  SKIN: No rashes; no palpable lesions    LABS:                        11.7   4.26  )-----------( 202      ( 14 Dec 2022 06:20 )             37.6     12-14    140  |  106  |  10  ----------------------------<  92  4.4   |  22  |  0.75    Ca    9.4      14 Dec 2022 06:20  Phos  4.8     12-14  Mg     1.50     12-14    RADIOLOGY & ADDITIONAL TESTS: Reviewed    COORDINATION OF CARE:  Care Discussed with Consultants/Other Providers [[facility]]   Attn - pt seen in [facility] - Pt with LVAD - c/o intermittent abdo pain and anorexia since starting Doxycycline for driveline infection since Friday - pain epigastrium and left side.  no BM in several days - was recommended to take Miralax, but has not taken.  NO: fever, n/v, abdo distention, no  sympt. pt has had lower back pain with radiation to left leg for many months and walks with cane.  no sob, turpin, lightheadedness.

## 2022-12-16 NOTE — PROGRESS NOTE ADULT - ATTENDING COMMENTS
Occupational Therapy    Patient not seen in therapy.     Pt continues to de-sat with activity, his peep is at 12. He is not appropriate for skilled intervention. Will discontinue OT order due to pt not being medically appropriate for skilled intervention all week. Please re-order OT when medically stable to participate in skilled OT intervention. Pt is established with TEP and PT will continue to monitor for TEP.                                      64 yo man with a history of Stage D HF s/p VAD placement ,history of COVID infection in April 2020, history of a prolonged hospitalization in 2021 with recurrent sepsis, pneumonia, intubated/trach x2 cycles , chronic gall bladder disease s/p perc dawn, SMA ischemia requiring a stent placement, cachexia who was discharged to rehab but presented from [facility] on 12/13 with AMS, hypotension, tachycardia found to be COVID (+) + serratia bacteremia. Initially admitted to ICU requiring pressor support, now on floors. Stable pending [facility] acceptance.     He appears well. NAD.    Serratia Bacteremia- On Cefepime since 12/13 will transition to 2 weeks of quinolone on d/c  Leukocytosis unclear etiology, possibly reactive due to dexamethasone, however last dose 12/22, currently 10s- CTM  COVID19 s/p monoclonal ab, Remdesivir and Dexamethasone currently on room air, no longer requires precautions - will discuss vaccination prior to discharge  HF: intermittent episodes of hypotension. Given poor po intake likely hypovolemic - fluids cautiously prn  Hyponatremia - send labs to evaluate for SIADH, repeat BMP post NS  Intractable Hiccups - on Baclofen  Amiodarone Toxicity - on Methimazole  Full code - palliative following regarding GOC  C diff - on PO Vancomycin  DC planning - still COVID positive 12/26

## 2023-01-26 NOTE — PROCEDURE NOTE - NSPERFORMEDBY_GEN_A_CORE
Problem: Pain  Goal: Verbalizes/displays adequate comfort level or baseline comfort level  1/26/2023 0543 by Nash Romero RN  Outcome: Progressing  Flowsheets (Taken 1/26/2023 0543)  Verbalizes/displays adequate comfort level or baseline comfort level:   Encourage patient to monitor pain and request assistance   Assess pain using appropriate pain scale  Note: Pt states noticing improvement in pain control with PCA pump. Problem: ABCDS Injury Assessment  Goal: Absence of physical injury  1/26/2023 0543 by Nash Romero RN  Outcome: Progressing     Problem: Skin/Tissue Integrity  Goal: Absence of new skin breakdown  Description: 1. Monitor for areas of redness and/or skin breakdown  2. Assess vascular access sites hourly  3. Every 4-6 hours minimum:  Change oxygen saturation probe site  4. Every 4-6 hours:  If on nasal continuous positive airway pressure, respiratory therapy assess nares and determine need for appliance change or resting period.   Outcome: Progressing
Myself
Myself

## 2023-01-27 NOTE — PROGRESS NOTE ADULT - PROBLEM SELECTOR PLAN 6
Image disc with right foot xrays is available at [facility]  to . Writer left a VM for patient with an update. Will also send patient a my[employer]Fiza message.    - S/p SMA stent 10/18, currently on ASA 81 mg PO QD. Continue to trend Hgb.

## 2023-01-30 NOTE — PROGRESS NOTE ADULT - PROBLEM SELECTOR PLAN 5
- No active bleeding seen on past enteroscopy  - Hgb stable for now, will hold AC/antiplatelets indefinitely  - Continue PPI  - transfuse PRN  - appreciate GI recs Labs/EKG/Imaging Studies/Medications

## 2023-02-14 NOTE — CHART NOTE - NSCHARTNOTEFT_GEN_A_CORE
Patient tachycardic to 140-150s and Tachypneic to 40-50.   Holding tube feed, IV Tylenol ordered.   Discussed with HF, likely 2/2 infection/sepsis, recommending to hold off IV BB for tachycardia. Monitor for now and to treat underlying infection.   BCx sent. CT C/A/P ordered to r/o infection.   Also discussed with ID, ABx broadened to Raven and Vanco.  s/p RRT for persistent tachypnea and tachycardia. s/p IVF, IV Tylenol, and labs ordered. Rapid ended with HR in low 140s.  Family at bedside updated. Will monitor patient closely for clinical status, vitals.  Dr. Vu made aware of the clinical status. General

## 2023-02-17 NOTE — DISCHARGE NOTE PROVIDER - NSRESEARCHGRANT_OVERRIDEREC_GEN_A_CORE
[facility] - Orthopedic Periop Services  Brief Operative Note    Surgery Date: 2/17/2023     Surgeon(s) and Role:     * Gagan Francis MD - Primary    Assisting Surgeon: None    Pre-op Diagnosis:  Chronic serous otitis media of both ears [H65.23]    Post-op Diagnosis:  Post-Op Diagnosis Codes:     * Chronic serous otitis media of both ears [H65.23]    Procedure(s) (LRB):  MYRINGOTOMY, WITH TYMPANOSTOMY TUBE INSERTION (Bilateral)    Anesthesia: General    Operative Findings: Fluid    Estimated Blood Loss: 0         Specimens:   Specimen (24h ago, onward)      None              Discharge Note    OUTCOME: Patient tolerated treatment/procedure well without complication and is now ready for discharge.    DISPOSITION: Home or Self Care    FINAL DIAGNOSIS: ELIJAH  FOLLOWUP: In clinic    DISCHARGE INSTRUCTIONS:  No discharge procedures on file.     This is a surgical and/or non-medical patient.

## 2023-02-18 NOTE — PROGRESS NOTE ADULT - PROBLEM SELECTOR PLAN 4
Pulmonary Progress Note  Interval History   2/18/23: The patient was seen and examined.  Episodes of third-degree heart block overnight.  Currently feels fine denies dizziness or chest pain.  Heart rate in the 70s.  Hypertensive  Continues to be on dobutamine    ROS:  As mentioned above    No intake/output data recorded.  Recent Labs   Lab 02/18/23  0331 02/17/23 1956   WBC 11.6* 9.8   RBC 4.07* 4.48*   HGB 13.4 14.8   HCT 40.3 44.2   MCV 99.0 98.7   MCH 32.9 33.0   MCHC 33.3 33.5    248     Recent Labs   Lab 02/18/23  0331 02/17/23 1956   CO2 26 25   BUN 18 16   AST  --  20     Recent Labs   Lab 02/17/23 2158   PT 10.3   PTT 27   INR 1.0      No results found.   Physical Exam     Visit Vitals  /79   Pulse 81   Temp 98 °F (36.7 °C) (Oral)   Resp 18   Ht 5' 9\" (1.753 m)   Wt 111.8 kg (246 lb 7.6 oz)   SpO2 98%   BMI 36.40 kg/m²     Physical Exam:  General:  resting comfortably in nad  Neuro: awake, alert, appropriate  Head: nc, at  Neck: supple, no jvd, no appreciable LAD  Resp: non-labored, ctab, no wheezes/crackles  CV: s1,s2; rrr; no audible murmur  Abd: s, nt, nd, bs+  Ext: no clubbing or edema; pulses present and equal bilaterally  Skin: no visible rashes     Assessment   Severe symptomatic bradycardia   -Episodes of third-degree heart block overnight  History of hypertension  History of sarcoidosis     Plan   -Continue Dobutamine will decreased to  5 mcs/ KG/ minute given that the patient is currently hypertensive  -We will await further recommendations from EP cardiology/cardiology  -Continue to hold antihypertensive and AV blocking agents.      Prophylaxis:  VTE: SQ heparin  Stress ulcer: Not indicated  CODE STATUS: Full resuscitation  His disposition remains medical intensive care unit      Patient is critically ill. Patient has still ongoing imminent organs dysfunction  that  requires  Critical Care.  Radiographs and Labs were reviewed personally with ICU team.  Critical care time spent is  37 minutes excluding teaching and procedure time      Pauly ivory MD FCCP   Pulmonary and Critical Care Medicine    DOS: 02/18/23 8:56 AM       - S/p trach 10/4 by ENT, consent given by HCP sister.

## 2023-03-07 NOTE — PROGRESS NOTE ADULT - ATTENDING COMMENTS
NEPHROLOGY CONSULT NOTE     Patient: Darren Clemente MRN: [id number]  PCP: Damien Clark MD   :     1936  Age:   81 y.o. Sex:  female      Referring physician: iNla oGod MD  Reason for consultation:   Admission Date: 3/6/2023  5:45 PM  LOS: 1 day        ASSESSMENT and PLAN :   JT - likely pre-renal from poor kidney perfusion. BP has been low since the weekend. Could be  from AF and taking home anti-hypertensive medications. Pt also has an ongoing UTI and taking abx which could be contributing to the JT. - UA consistent with UTI  - Renal US ordered  - hold amplodipine, HCTZ and lisinopril  - volume expand  - avoid nephrotoxic medications and renally dose medications  - Cr already improving 1.2  - check daily renal indices    Hypertension  - hold amplodipine, HCTZ and lisinopril  - BP goal <120?  - vascular consulted for recent AAA repair    Hyponatremia  - 133 sodium  - continue to monitor    Agree with NP assessment. Hold BP med's for now           Subjective:   HPI: Darren Clemente is a 81 y.o.  female with past medical history significant for HTN, COPD, recent AAA repair, presented w/ weakness, hypotension, UTI, JT, new afib RVR. She has been feeling week during the past weekend  and her BP has been low. Pt continued taking her anti-hypertensive mediations. BP has been in the 80's. She denies nausea, vomiting, diarrhea, chest pain, flank pain, dysuria, swelling.     Past Medical Hx:   Past Medical History:   Diagnosis Date    Abdominal aneurysm     Arthritis     Chronic obstructive pulmonary disease ([facility] [address]     Hyperlipidemia     Hypertension         Past Surgical Hx:     Past Surgical History:   Procedure Laterality Date    COLONOSCOPY N/A 2017    COLONOSCOPY, EGD performed by Chelsy Yun MD at [address] HEENT Bilateral 2023    cateract surgery    HX ORTHOPAEDIC Left     ORIF left wrist       Medications:  Prior to Admission medications    Medication Sig Start Date End Date Taking? Authorizing Provider   HYDROcodone-acetaminophen (NORCO) 5-325 mg per tablet Take 1 Tablet by mouth every four (4) hours as needed for Pain for up to 3 days. Max Daily Amount: 6 Tablets. 3/3/23 3/6/23  Edgar Garcia MD   loratadine (CLARITIN) 10 mg tablet Take 10 mg by mouth Every morning. Provider, Historical   cholecalciferol, vitamin d3, 10 mcg (400 unit) cap Take 1 Tablet by mouth Every morning. Provider, Historical   tiotropium-olodateroL (Stiolto Respimat) 2.5-2.5 mcg/actuation inhaler Take 2 Puffs by inhalation Every morning. Provider, Historical   OTHER Administer 1 Drop to both eyes Every morning. Prednicort eye drops    Provider, Historical   aspirin 81 mg chewable tablet Take 1 tablet by mouth daily. 9/9/14   Casandra Berumen MD   amLODIPine (NORVASC) 5 mg tablet Take 5 mg by mouth Every morning. Provider, Historical   benazepril (LOTENSIN) 20 mg tablet Take 20 mg by mouth Every morning. Provider, Historical   fish oil-omega-3 fatty acids 340-1,000 mg capsule Take 2 Capsules by mouth daily. Provider, Historical   pravastatin (PRAVACHOL) 40 mg tablet Take 40 mg by mouth nightly. Other, MD Vanessa   hydrochlorothiazide (HYDRODIURIL) 25 mg tablet Take 25 mg by mouth Every morning. Other, MD Vanessa       Allergies   Allergen Reactions    Tetracycline Other (comments)     Mouth soreness       Social Hx:  reports that she quit smoking about 7 years ago. Her smoking use included cigarettes. She has a 35.00 pack-year smoking history. She has never used smokeless tobacco. She reports that she does not drink alcohol and does not use drugs. Family History   Problem Relation Age of Onset    Cancer Brother        Review of Systems:  A twelve point review of system was performed today. Pertinent positives and negatives are mentioned in the HPI. The reminder of the ROS is negative and noncontributory.      Objective:    Vitals:    Vitals:    03/07/23 0700 03/07/23 0744 03/07/23 0755 03/07/23 1057   BP:   (!) 97/45 (!) 91/43   Pulse: (!) 106  (!) 109 (!) 113   Resp:   20 21   Temp:   98.3 °F (36.8 °C) 98.1 °F (36.7 °C)   SpO2:  94% 92% 94%     I&O's:  03/06 0701 - 03/07 0700  In: 2184 [I.V.:2184]  Out: -   Visit Vitals  BP (!) 91/43 (BP 1 Location: Left upper arm, BP Patient Position: At rest)   Pulse (!) 113   Temp 98.1 °F (36.7 °C)   Resp 21   SpO2 94%       Physical Exam:    GENERAL : Lying down in bed with no acute distress  HEENT: AT NC PEERLA   NECK: Supple no JVP  CVS: S1 S2 RRR, no murmur or gallops heard  RS: CTABL, no rhonchi,or wheezing heard  ABDOMEN: soft NT ND positive BS  EXTREMITY: No edema clubbing or cyanosis, pedal pulse +  NEUROLOGY: AAA X3, no focal deficit or asterixis  VASCULAR ACCESS:      Laboratory Results:    BMP:   Lab Results   Component Value Date/Time     (L) 03/07/2023 10:30 AM    K 3.5 03/07/2023 10:30 AM     03/07/2023 10:30 AM    CO2 23 03/07/2023 10:30 AM    AGAP 7 03/07/2023 10:30 AM     (H) 03/07/2023 10:30 AM    BUN 45 (H) 03/07/2023 10:30 AM    CREA 1.20 (H) 03/07/2023 10:30 AM         No results found for this or any previous visit. No results found for this visit on 03/06/23 (from the past 12 hour(s)). Thank you for consulting [facility] in the care of your patient. seems more alert today. nursing staff will try to walk with him.   trach collar for 3 days!  vascular procedure planned for tomorrow. patient aware and does not seem to object.   meds: hydorcortisone, methimazole, reglan, PPI, bactrim IV, PO Vanco IV, D5.   LVAD 9200 power 5.8, flow 5.7.   MAPs 73-78  10-17  134<L>  |  97  |  5<L>  ----------------------------<  98  4.3   |  24  |  0.46<L>  Ca    9.9      17 Oct 2021 00:30  Phos  2.3     10-17  Mg     1.6     10-17  TPro  7.7  /  Alb  4.0  /  TBili  0.9  /  DBili  x   /  AST  12  /  ALT  21  /  AlkPhos  149<H>  10-17                        10.8   8.34  )-----------( 190      ( 17 Oct 2021 00:32 )             33.6   .  For vascular procedure tomorrow. (aortogram, mesenteric angiogram plus/minus intervention)   continue supportive care.   Zi Werner

## 2023-03-20 NOTE — PROGRESS NOTE ADULT - PROBLEM SELECTOR PLAN 2
Ms. Jolly Christianson sp fall, rt facial edema  follow up ENT as outpt  CT of IAC shows acute posteriorly displaced fracture of the anterior wall of the left external auditory canal with partial opacification of the external auditory canal.  Left ear cauterized and packed with surgicel x 1, packing removed by ENT 9/10

## 2023-03-22 NOTE — PROGRESS NOTE ADULT - ASSESSMENT
[address]    65M PMH LVAD, recurrent sepsis, pneumonia, intubated/trach x2 cycles, chronic gall bladder disease s/p percutaneous cholecystectomy, SMA ischemia s/p stent, cachexia who presented from [facility] on 12/13 with septic shock 2/2 COVID (+) S/P pressor support now titrated off and downgraded to the floor. Hosp course c/b serratia bacteremia on IV Zosyn through 1/25, aspiration PNA, VT due to hypokalemia, and now CT finding of splenic abscess medically managing.

## 2023-04-03 NOTE — H&P ADULT - NSICDXPASTMEDICALHX_GEN_ALL_CORE_FT
----- Message from Carleen Perez sent at 4/3/2023  4:22 PM CDT -----  Regarding: return call  Contact: patient  PT returning call he missed from the office, return call [phone]       PAST MEDICAL HISTORY:  ACC/AHA stage D systolic heart failure     Anticoagulation goal of INR 2.0 to 2.5     Bleeding hemorrhoids     BPH with urinary obstruction     CAD (coronary artery disease)     CHF (congestive heart failure)     CKD (chronic kidney disease), stage III     Claudication     Clavicle fracture     Depression     Falls     GI bleed     H/O epistaxis     Hemorrhoids     History of 2019 novel coronavirus disease (COVID-19) april 2020    Iron deficiency anemia     Peripheral arterial disease     Pleural effusion     Vertigo

## 2023-04-18 NOTE — PROCEDURE NOTE - NSTOLERANCE_GEN_A_CORE
Patient tolerated procedure well.
Additional Notes: Recommended compression socks
Patient tolerated procedure well.
Render Risk Assessment In Note?: no
Detail Level: Simple
Patient tolerated procedure well.

## 2023-05-01 NOTE — PATIENT PROFILE ADULT - FALL HARM RISK
As a final attempt, a third outreach has been made via fax to facility  Please see Contacts section for details  This encounter will be closed and completed by end of day  Should we receive the requested information because of previous outreach attempts, the requested patient's chart will be updated appropriately       Thank you  Jovany Zendejas MA coagulation(Bleeding disorder R/T clinical cond/anti-coags)/other

## 2023-05-23 NOTE — PROGRESS NOTE ADULT - PROBLEM SELECTOR PROBLEM 1
[address] [place] [address] Mathias Mor[address], [address] [facility]  P [phone]  F [phone]    Office Note  Patient ID:  Name:  Ct Kay  MRN:  [id number]  :  1952/70 y.o. Date:  10/19/2022      HISTORY OF PRESENT ILLNESS:  Ms. Ct Kay is a 80 y.o.  female who on  underwent Robotic-assisted total laparoscopic hysterectomy, Bilateral salpingo-oophorectomy, Bilateral pelvic sentinel lymph node mapping and biopsy. Final pathology consistent with Stage Ib, FIGO Grade 1 endometrial cancer. 8mm out of 15mm invasion. Negative washings. Negative SLNDs. LVSI negative. Completed VBT 2020 (30Gy in 3 fractions). Presents today for continued surveillance. Doing well overall. She reports that using vaginal dilators was painful and she is no longer using these. The patient is not sexually active. Denies vaginal bleeding/discharge, abdominal/pelvic pain, nausea, vomiting, constipation, diarrhea, CP, SOB, hematuria, hematochezia, change in appetite or bowel movements, bloating, fevers, chills, or urinary symptoms. Initial History:  Ms. Ct Kay is a 72 y.o.  postmenopausal female who presents in consultation from Dr. Esteban Ramos for FIGO Grade 1 endometrial cancer. The patient first reports vaginal spotting/bleeding in 2019. She was ultimately referred to Dr. Esteban Ramos by her PCP. On 2020 underwent hysteroscopy/D&C with final pathology consistent with FIGO Grade 1 endometrial cancer. Reports some spotting since her surgery. History of urinary incontinence for which she wears a pad. Denies hematuria or hematochezia. Denies change in appetite or bowel habits, nausea, vomiting, diarrhea, CP, SOB, fevers, or chills. Reports long history of constipation for which she uses senna S. Pertinent PMH/PSH: h/o , obesity, HTN, MCKENNA on CPAP      Active, no restrictions.      Pathology Review:   :  FINAL PATHOLOGIC DIAGNOSIS   1. COVID-19

## 2023-06-05 NOTE — PROGRESS NOTE ADULT - PROBLEM SELECTOR PROBLEM 7
Mesenteric artery stenosis Glycopyrrolate Pregnancy And Lactation Text: This medication is Pregnancy Category B and is considered safe during pregnancy. It is unknown if it is excreted breast milk.

## 2023-06-19 NOTE — DISCHARGE NOTE ADULT - LIMIT YOUR ALCOHOL INTAKE
Refill request    Medication: plavix 75 MG PO daily (90 tabs, 3 refills)  Last clinic visit: 1/30/23  Follow up visit: 2/5/24  MD prescribed: Dr. Disla    Chart reviewed.  E prescribed to preferred pharmacy.      LOV 1/30/23  IMPRESSION & PLAN   1. Ischemic Cardiomyopathy with CAD s/p CABG x 5 on 11/24/05 and redo on 8/24/06 x 6 vessels. PCI of 1 of the grafts as above.  NYHA class 3 stage C.  His ejection fraction remains less than 30% and has been so for many years now.  He is clinically stable and tolerating medications.  --continue current medications     2. Hyperlipidemia--controlled.  --labs due later this year     3. Atrial fib/flutter, AICD.  --per electrophysiology     4. Mild mitral and moderate tricuspid regurgitation by echo in 2020.  --continue to follow clinically     DISPOSITION  Follow up in 1 year.  
Statement Selected

## 2023-07-02 NOTE — PROGRESS NOTE ADULT - PROBLEM SELECTOR PROBLEM 2
Hyponatremia 34yo F here w/ concern for retained tampon x1 week. States felt FB senstaion in vagina today and felt, realized it was in, couldn't feel string to pull out so came in. Also admits to drinking alcohol last night and feeling very dehydrated and hungover. No fevers/chills, denies rash. Otherwise in USOH when this happened. Has not noted any vaginal discharge or odor.

## 2023-07-07 NOTE — PROGRESS NOTE ADULT - PROBLEM/PLAN-8
DISPLAY PLAN FREE TEXT
4 = No assist / stand by assistance

## 2023-07-21 NOTE — PROGRESS NOTE ADULT - PROBLEM SELECTOR PROBLEM 7
Discharge planning issues Oral Minoxidil Counseling- I discussed with the patient the risks of oral minoxidil including but not limited to shortness of breath, swelling of the feet or ankles, dizziness, lightheadedness, unwanted hair growth and allergic reaction.  The patient verbalized understanding of the proper use and possible adverse effects of oral minoxidil.  All of the patient's questions and concerns were addressed.

## 2023-08-30 NOTE — CHART NOTE - NSCHARTNOTEFT_GEN_A_CORE
Source: Patient [ X]    Family [ ]     other [ X] RN, MD, medical record    Pt seen, reports he slept better last night and therefore feels better. Pt reports a good PO intake and confirms he is drinking Ensure Enlive x2 in between meals. Brief education reviewed at this time as Pt reports being frustrated with multiple staff asking repeated questions. Pt aware that RD remains available for nutrition intervention as well as education as Pt amenable.     Per chart: Pt admitted with abdominal pain, hypotension and tachycardia. Pt with NICM, new onset acute systolic decompensated HF with EF of 10%, cardiogenic shock, and low output. Pt s/p TTE with mod/severe MR, ? LV thrombus. Plan for virtual colonoscopy tomorrow. LVAD evaluation continues.     Diet :  Regular, Ensure enlive x2      Patient reports nausea and vomiting "a few days ago" but feels better now. S/p CT A by GI, with no findings and recommend PO diet as toelrated.      PO intake:  100%     Source for PO intake [ X] Patient and chart     Current Weight: 59.8kg, admission wt: 66.1kg, Decrease noted, likely related to fluid shifts   % Weight Change    Pertinent Medications: MEDICATIONS  (STANDING):  pantoprazole    Tablet 40 milliGRAM(s) Oral two times a day before meals  sucralfate suspension 1 Gram(s) Oral four times a day  atorvastatin 20 milliGRAM(s) Oral at bedtime  tiotropium 18 MICROgram(s) Capsule 1 Capsule(s) Inhalation daily  multivitamin 1 Tablet(s) Oral daily  buDESOnide 160 MICROgram(s)/formoterol 4.5 MICROgram(s) Inhaler 2 Puff(s) Inhalation two times a day  milrinone Infusion 0.375 MICROgram(s)/kG/Min (6.75 mL/Hr) IV Continuous <Continuous>  hydrALAZINE 50 milliGRAM(s) Oral every 8 hours  heparin  Infusion.  Unit(s)/Hr (11 mL/Hr) IV Continuous <Continuous>    MEDICATIONS  (PRN):  aluminum hydroxide/magnesium hydroxide/simethicone Suspension 30 milliLiter(s) Oral every 4 hours PRN Dyspepsia  heparin  Injectable 4500 Unit(s) IV Push every 6 hours PRN For aPTT less than 40  heparin  Injectable 2000 Unit(s) IV Push every 6 hours PRN For aPTT between 40 - 57    Pertinent Labs:  Hgb/Hct:13.2/39.2, Na:136, K:4.3, Cl:102, BUN:21, Cr:0.81, Glucose:136-high, Ca:9.3, Total Protein:6.6, Albumin:3.3,  Total Bilirubin:, Direct Bilirubin:0.3, AlkPhos:94, AST:36, ALT:54-high, M.1, Phos:3.3.      Skin: No pressure ulcers, N edema.     Estimated Needs:   [X ] no change since previous assessment  [ ] recalculated:       Previous Nutrition Diagnosis:     [X ] Increased Nutrient Needs and [X] Malnutrition          Nutrition Diagnosis is [ X] ongoing, addressed with ensure enlive x2          New Nutrition Diagnosis: [X ] not applicable    Recommend    1. Continue regular diet at this time to encourage PO intake. Trend Wt and labs.   2. Continue Ensure enlive x2 to supplement PO intake   3. Encourage PO intake at meal times.   4. Provide foods preferences as requested and medically feasible  5. Reinforce diet education as Pt amenable      Monitoring and Evaluation:     [X ] PO intake [X ] Tolerance to diet prescription [X ] weights [ X] follow up per protocol    [X ] other: RD remains available Sarah Siegler RD. Pager #[phone] Admission

## 2023-09-20 NOTE — SWALLOW FEES ASSESSMENT ADULT - PHARYNGEAL PHASE COMMENTS
Mom is ok with trying liquid methylphenidate   incoordinated swallow sequence  pharyngeal swallow initiation with bolus at the level of the pyriforms  deep silent laryngeal penetration to the level of the vocal folds without retrieval during single straw sip trial, was able to clear with a cued cough  Trace shallow laryngeal penetration over the interarytenoid space via cup sip or teaspoon delivery, does not appear to descend, clears with reswallow  trace vallecular and pyriform sinus residue Trace to mild vallecular and pyriform sinus residue  Trace residual over the interarytenoid space that does not appear to descend into the laryngeal vestibule with sequential pudding trials incoordinated swallow sequence  pharyngeal swallow initiation with bolus at the level of the pyriforms  trace vallecular and pyriform sinus residue  trace shallow residual over the interarytenoid space, does not appear to descend, clears with reswallow Max vallecular residue, with noted difficulty clearing with cued reswallows, eventually cleared with multiple cued swallows and cued use of chin tuck, followed by liquid wash  trace pyriform sinus residue  Laryngeal penetration over the interarytenoid space that appears to clear with cued reswallows

## 2023-09-20 NOTE — ED PROVIDER NOTE - CONSTITUTIONAL NEGATIVE STATEMENT, MLM
[facility] Pregnancy And Lactation Text: This medication is Pregnancy Category C. It is unknown if this medication is excreted in breast milk. no fever and no chills.

## 2023-11-18 NOTE — PROGRESS NOTE ADULT - PROBLEM/PLAN-1
DISPLAY PLAN FREE TEXT
respiratory failure

## 2023-12-05 NOTE — H&P ADULT - FAMILY HISTORY
Problem: Chronic Conditions and Co-morbidities  Goal: Patient's chronic conditions and co-morbidity symptoms are monitored and maintained or improved  Outcome: Progressing     Problem: Safety - Adult  Goal: Free from fall injury  Outcome: Progressing     Problem: Discharge Planning  Goal: Discharge to home or other facility with appropriate resources  Outcome: Progressing     Problem: Skin/Tissue Integrity  Goal: Absence of new skin breakdown  Description: 1. Monitor for areas of redness and/or skin breakdown  2. Assess vascular access sites hourly  3. Every 4-6 hours minimum:  Change oxygen saturation probe site  4. Every 4-6 hours:  If on nasal continuous positive airway pressure, respiratory therapy assess nares and determine need for appliance change or resting period.   Outcome: Progressing     Problem: ABCDS Injury Assessment  Goal: Absence of physical injury  Outcome: Progressing     Problem: Pain  Goal: Verbalizes/displays adequate comfort level or baseline comfort level  Outcome: Progressing     Problem: Nutrition Deficit:  Goal: Optimize nutritional status  Outcome: Progressing No pertinent family history in first degree relatives

## 2023-12-08 NOTE — PROGRESS NOTE ADULT - ASSESSMENT
elderly patient with chronic smoking  history admitted with cough x 2 wks with SOB and found to have CHF with new severe LV dysfunction with CT scan suggestive of emphysema and moderate bilateral pleural effusion. He also is suspected to have LV thrombus? also had bilious vomiting and nausea with some abdominal pain: he is found to have desaturation in the night while sleeping. Genny

## 2023-12-09 NOTE — PROCEDURE NOTE - NSCOMPLICATION_GEN_A_CORE
"Chief Complaint   Patient presents with     Physical       Initial /80 (BP Location: Right arm, Patient Position: Chair, Cuff Size: Adult Large)  Pulse 71  Temp 98.2  F (36.8  C) (Oral)  Ht 5' 4\" (1.626 m)  Wt 176 lb 8 oz (80.1 kg)  SpO2 97%  BMI 30.3 kg/m2 Estimated body mass index is 30.3 kg/(m^2) as calculated from the following:    Height as of this encounter: 5' 4\" (1.626 m).    Weight as of this encounter: 176 lb 8 oz (80.1 kg).  Medication Reconciliation: complete      Health Maintenance addressed:  PHQ9    n/a      " no complications 09-Dec-2023

## 2023-12-27 NOTE — PROGRESS NOTE ADULT - SUBJECTIVE AND OBJECTIVE BOX
Sent a new rx, Vyvanse,  60 mg    Thanks     INFECTIOUS DISEASES FOLLOW UP-- Anitra Prater  [phone]    This is a follow up note for this  66yMale with  Anemia    Acute cholecystitis        ROS:  CONSTITUTIONAL: c/o abdominal pain    Allergies    No Known Allergies    Intolerances        ANTIBIOTICS/RELEVANT:  antimicrobials  meropenem  IVPB 1000 milliGRAM(s) IV Intermittent every 8 hours  vancomycin    Solution 125 milliGRAM(s) Oral every 6 hours  vancomycin  IVPB 1000 milliGRAM(s) IV Intermittent every 12 hours    immunologic:    OTHER:  acetaminophen    Suspension .. 650 milliGRAM(s) Oral every 6 hours PRN  chlorhexidine 0.12% Liquid 15 milliLiter(s) Oral Mucosa every 12 hours  clonazePAM  Tablet 0.25 milliGRAM(s) Oral at bedtime  dextrose 5% + sodium chloride 0.45%. 1000 milliLiter(s) IV Continuous <Continuous>  heparin   Injectable 5000 Unit(s) SubCutaneous every 8 hours  metoclopramide Injectable 10 milliGRAM(s) IV Push every 8 hours  mirtazapine 7.5 milliGRAM(s) Oral daily  niCARdipine Infusion 3 mG/Hr IV Continuous <Continuous>  ondansetron Injectable 4 milliGRAM(s) IV Push every 6 hours PRN  pantoprazole  Injectable 40 milliGRAM(s) IV Push every 12 hours  predniSONE   Tablet 40 milliGRAM(s) Oral daily  propofol Infusion 21.231 MICROgram(s)/kG/Min IV Continuous <Continuous>  propranolol 20 milliGRAM(s) Oral every 8 hours      Objective:  Vital Signs Last 24 Hrs  T(C): 36.9 (15 Aug 2021 08:00), Max: 36.9 (14 Aug 2021 16:00)  T(F): 98.4 (15 Aug 2021 08:00), Max: 98.4 (14 Aug 2021 16:00)  HR: 100 (15 Aug 2021 13:53) (98 - 118)  BP: --  BP(mean): --  RR: 23 (15 Aug 2021 12:00) (16 - 39)  SpO2: 100% (15 Aug 2021 13:53) (100% - 100%)    PHYSICAL EXAM:  Constitutional:no acute distress but not feeling well  Eyes:ALONSO, EOMI  Ear/Nose/Throat: no oral lesions, trach site with thick secretions	  Respiratory: decreased at bases  Cardiovascular: [id number] sounds  Gastrointestinal: tender  RUQ biliary drain minimal bilious material  Extremities:no e/e/c  No Lymphadenopathy  IV sites not inflammed.    LABS:                        8.9    17.06 )-----------( 233      ( 15 Aug 2021 00:29 )             28.2     08-15    134<L>  |  95<L>  |  24<H>  ----------------------------<  160<H>  3.6   |  24  |  0.45<L>    Ca    10.3      15 Aug 2021 00:29  Phos  2.0     08-15  Mg     1.8     08-15    TPro  8.6<H>  /  Alb  3.5  /  TBili  1.2  /  DBili  x   /  AST  39  /  ALT  83<H>  /  AlkPhos  131<H>  08-15      Urinalysis Basic - ( 14 Aug 2021 12:47 )    Color: Yellow / Appearance: Clear / S.041 / pH: x  Gluc: x / Ketone: Small  / Bili: Small / Urobili: Negative   Blood: x / Protein: 100 / Nitrite: Negative   Leuk Esterase: Negative / RBC: x / WBC x   Sq Epi: x / Non Sq Epi: x / Bacteria: x        MICROBIOLOGY:            RECENT CULTURES:   @ 07:22  .Blood Blood-Peripheral  --  --  --    No growth to date.  --   @ 01:33  .Sputum Sputum  --  --  --    Numerous Enterobacter cloacae complex  --   @ 23:00  .Blood Blood-Peripheral  --  --  --    Growth in aerobic bottle: Gram Positive Cocci in Clusters  Growth in anaerobic bottle: Gram Positive Cocci in Clusters  **BCID performed. No targets detected.**  ***Blood Panel PCR results on this specimen are available  approximately 3 hours afterthe Gram stain result.***  Gram stain, PCR, and/or culture results may not always  correspond due to difference in methodologies.  ************************************************************  This PCR assay was performed by multiplex PCR. This  Assay tests for 66 bacterial and resistance gene targets.  Please refer to the [facility] test directory  at [email or web address] for details.  --      RADIOLOGY & ADDITIONAL STUDIES:    < from: Xray Chest 1 View- PORTABLE-Routine (Xray Chest 1 View- PORTABLE-Routine in AM.) (21 @ 02:34) >    Findings/  Impression: G-tube tip below diaphragm. Status post open heart surgery. Status post tracheostomy. Heart size unremarkable. Left lower lung patchy opacity representing atelectasis versus pneumonia.    < end of copied text >

## 2024-01-01 NOTE — PROGRESS NOTE ADULT - PROBLEM SELECTOR PROBLEM 6
[facility]   Intensive Care Unit Daily Note    Name: Cole (Male-Silvio Zaragoza)  Parents: Silvio Zaragoza and Jenny Anders  YOB: 2023    History of Present Illness   Cole was born  at 25w6d weighing 1 lb 14 oz (850 g) by spontaneous vaginal delivery due to  labor. Our team was asked by Dr. Blanchard (OBGYN) to care for this infant born at [facility].      The infant was admitted to the NICU for further evaluation, monitoring and management of prematurity, RDS and possible sepsis.    Hospital course with the following problem list:  Patient Active Problem List   Diagnosis    Extreme immaturity of , gestational age 25 completed weeks    Respiratory failure of  (H28)    Need for observation and evaluation of  for sepsis    Slow feeding in         Interval History   No acute concerns overnight.   Vitals:    24 0150 24 0200 24 0200   Weight: 0.78 kg (1 lb 11.5 oz) 0.79 kg (1 lb 11.9 oz) 0.81 kg (1 lb 12.6 oz)      Weight change: 0.02 kg (0.7 oz)   -5% change from BW     Assessment & Plan   Overall Status:    4 day old  VLBW male infant who is now 26w3d PMA.     This patient is critically ill with respiratory failure requiring CPAP.      Vascular Access:  PICC - needed for nutrition/hydration, placed 6/15. In acceptable position -.     S/p UAC - appropriate position confirmed by radiograph . Removed .     FEN:    Growth: AGA at birth.  Malnutrition: At risk.  Metabolic Bone Disease of Prematurity: At risk.     I/O:  129 ml/kg/day, 30 kcal/kg/day  1.1 ml/kg/day UOP, + stool     - TF goal 140 ml/kg/day (reduced due to less brisk UOP and hypernatremia DOL1-DOL2)   - custom TPN, with goal GIR 8, AA 3, max acetate. SMOF 3  - Enterals: Initiate MBM/DBM feeds at 2 mL q2h and advance as tolerates   - Feeding Hx: Max feeds 2 mL q 2 hours MBM/DBM, while on indocin Made NPO  given  "green tinged emesis X2. Upper GI with normal anatomy and suspected slow small bowel motility.  - Glycerin q 12 hours  - Labs: per TPN labs, glucose q12h   - Hx hyperglycemia: advancing slowly in TPN  - Input from dietician wrt nutritional status/management/monitoring.   - Input from OT and lactation specialists.     No results found for: \"ALKPHOS\"    Respiratory: Ongoing failure, due to RDS, requiring CPAP.    Current support: bCPAP 6, FiO2 26-36%  - Continue current support  - Monitor FiO2; would consider HOLMAN or intubation for surfactant if worsening work of breathing or increased FiO2 requirement   - Vit A for BPD prophylaxis until on fortified feeds  - Continue routine CR monitoring    Arterial Blood Gas  Recent Labs   Lab 06/16/24  0553 06/15/24  0356 06/15/24  0349   PH 7.32* 7.31*  --    PCO2 43* 47*  --    PO2 50* 60*  --    HCO3 22 24  --    O2PER 24 21 40      Apnea of Prematurity: No ABDs.   - Continue caffeine administration until ~33-34 weeks PMA.     - Weight adjust dosing with growth    Cardiovascular: Reassuring signs of adequate oxygen delivery to meet oxygen demand. BPs 40s/20s; did have lower DBP overnight suggestive of L-->R PDA flow with dropping pulmonary pressures.   - Echo 6/18 s/p indomethacin with small to moderate sized (0.15 cm) patent ductus arteriosus. There is continous left to right shunting across the patent ductus arteriosus (34 mmHg pressure diffence).There is no diastolic runoff in the abdominal aorta. Possible PFO with left to right flow.   - Continue routine CR monitoring  - Monitor perfusion    Renal: At risk for NICKI, with potential for CKD, due to prematurity and nephrotoxic medication exposure.    Currently with robust UO. Cr elevated, though likely WNL for age/reflective as least partially of mother's renal function.   - Monitor UO/fluid status/ BP  - Daily Cr while on indomethacin.   - Cre elevate, continue to trend now finishing course     Creatinine   Date Value Ref Range " "Status   2024 (H) 0.31 - 0.88 mg/dL Final   2024 (H) 0.31 - 0.88 mg/dL Final     BP Readings from Last 6 Encounters:   24 61/32      ID: Receiving empiric antibiotic therapy for possible sepsis due to  delivery and RDS, evaluation NTD. S/p IV ampicillin and gentamicin for 48 hours of coverage given clinical stability and negative blood culture.  - Fluconazole prophylaxis while central lines for first 4 weeks of life  - Routine IP surveillance tests for MRSA on DOL 7    No results found for: \"CRPI\"   Blood culture:  Results for orders placed or performed during the hospital encounter of 06/15/24   Blood Culture Line, venous    Specimen: Line, venous; Blood   Result Value Ref Range    Culture No growth after 3 days       Hematology:  CBC on admission significant for elevated WBC.  - Trend with TPN labs    Anemia - risk is high.   - Transfusions: PRBCs on   - Anticipate darbepoeitin at 1 week of life  - Plan to evaluate need for iron supplementation at/after 2 weeks of age when tolerating full feeds.  - Monitor serial hemoglobin  - Transfuse as needed w goal Hgb >12  - Monitor serial ferritin levels, per dietician's recommendations.  Hemoglobin   Date Value Ref Range Status   2024 15.0 - 24.0 g/dL Final   2024 15.0 - 24.0 g/dL Final     No results found for: \"PRASANNA\"    Hyperbilirubinemia: Indirect hyperbilirubinemia due to prematurity. Maternal blood type O+. Infant Blood type O+ RISA neg.  - Monitor serial t/d bilirubin levels, next check in AM  - On phototherapy for bili >5, now down trending on photo, discontinued     Bilirubin Total   Date Value Ref Range Status   2024   mg/dL Final   2024   mg/dL Final   2024   mg/dL Final   2024   mg/dL Final     Bilirubin Direct   Date Value Ref Range Status   2024 0.00 - 0.50 mg/dL Final     Comment:     Hemolysis present. The true direct bilirubin value may be " significantly higher than the reported value.   2024 (H) 0.00 - 0.50 mg/dL Final   2024 0.00 - 0.50 mg/dL Final   2024 0.00 - 0.50 mg/dL Final     Skin: arm excoriation  - Hydragel  - Wound care consulting     CNS: At risk for IVH/PVL.    - Completed prophylactic indocin.  - Obtain screening head ultrasounds on DOL 7 (eval for IVH) and at ~35-36 wks GA (eval for PVL).  - Monitor clinical exam and weekly OFC measurements.    - Developmental cares per NICU protocol  - GMA per protocol    Sedation/ Pain Control:   - Nonpharmacologic comfort measures. Sweetease with painful minor procedures.     Ophthalmology: At risk for ROP due to prematurity   - Schedule ROP with Peds Ophthalmology per protocol.    Thermoregulation: Stable with current support via isolette.  - Continue to monitor temperature and provide thermal support as indicated.    Psychosocial: Appreciate social work involvement and support.   - PMAD screening: Recognizing increased risk for  mood and anxiety disorders in NICU parents, plan for routine screening for parents at 1, 2, 4, and 6 months if infant remains hospitalized.     HCM and Discharge planning:   Screening tests indicated:  - [place] at 24 hr -- pending  - Repeat NMS at 14 do  - Final repeat NMS at 30 do  - CCHD screen anticipated to be completed by echo  - Hearing screen at/after 35wk PMA  - Carseat trial to be done just PTD  - OT input   - Continue standard NICU cares and family education plan.  - Consider outpatient care in [facility] and [facility].    Immunizations   BW too low for Hep B immunization at <24 hr.  - give Hep B immunization at 21-30 days old     There is no immunization history for the selected administration types on file for this patient.     Medications   Current Facility-Administered Medications   Medication Dose Route Frequency Provider Last Rate Last Admin    Breast Milk  label for barcode scanning 1 Bottle  1 Bottle Oral Q1H PRN Mahi Lim MD   1 Bottle at 24 1158    caffeine citrate (CAFCIT) injection 8.4 mg  10 mg/kg Intravenous Daily Mahi Lim MD   8.4 mg at 24 0605    fluconazole (DIFLUCAN) PEDS/NICU injection 5.1 mg  6 mg/kg Intravenous Q72H Mahi Lim MD 1.3 mL/hr at 24 0634 5.1 mg at 24 0634    glycerin (PEDI-LAX) Suppository 0.125 suppository  0.125 suppository Rectal Q12H Eugenia Dorantes MD   0.125 suppository at 24    [START ON 2024] hepatitis b vaccine recombinant (ENGERIX-B) injection 10 mcg  0.5 mL Intramuscular Prior to discharge Mahi Lim MD        lipids 4 oil (SMOFLIPID) 20% for neonates (Daily dose divided into 2 doses - each infused over 10 hours)  2 g/kg/day (Dosing Weight) Intravenous infused BID (Lipids ) Chel Anglin MD   4.3 mL at 24    parenteral nutrition - INFANT compounded formula   CENTRAL LINE IV TPN CONTINUOUS Chel Anglin MD 4.9 mL/hr at 24 New Bag at 24    sodium chloride 0.45% lock flush 0.8 mL  0.8 mL INTRA-ARTERIAL Q5 Min PRN Mahi Lim MD        sodium chloride 0.45% lock flush 0.8 mL  0.8 mL Intracatheter Q5 Min PRN Mahi Lim MD   0.8 mL at 24 0230    sodium chloride 0.45% lock flush 1 mL  1 mL Intracatheter Q4H Narinder Nunez MD   1 mL at 24 0413    sucrose (SWEET-EASE) solution 0.2-2 mL  0.2-2 mL Oral Once PRN Mahi Lim MD        sucrose (SWEET-EASE) solution 0.2-2 mL  0.2-2 mL Oral Q1H PRN Mahi Lim MD   0.2 mL at 24 0538    Vitamin A 50,000 units/ml (15,000 mcg/mL) injection 5,000 Units  5,000 Units Intramuscular Q  AM Eugenia Dorantes MD   5,000 Units at 24 0742        Physical Exam    General: Comfortable infant, resting in isolette, appearance consistent with corrected gestational age.    HEENT: AFOSF. CPAP in place.   Respiratory: Mildly increased respiratory rate and no retractions, head  bobbing or nasal flaring. On auscultation, clear bubbling sounds present throughout lung fields bilaterally, symmetrically aerated.   Cardiac: Heart rate regular with no murmur appreciated over CPAP. Distal pulses strong and symmetric bilaterally.   Abdomen: Soft, non-distended and non-tender.   Neuro: Normal tone for age, with symmetric extremity movement.   Skin: Tacky, pink, scattered bruising.       Communications   Parents:   Name Home Phone Work Phone Mobile Phone Relationship Lgl Grd   CELIO ZARAGOZA [phone] Mother    ABIMBOLA ENRIQUE [facility] [phone] Father       Family lives in [place], [place]   not needed   Updated after rounds.     Care Conferences:   None to date, will need Small Baby Conference in first 2 weeks    PCPs:   Infant PCP: Physician No Ref-Primary  Maternal OB PCP:   Information for the patient's mother:  Celio Zaragoza [[id number]]   Tiffanie Hansen     MFM: None  Delivering Provider: Dr. Blanchard   Admission note routed to all maternal providers.    Health Care Team:  Patient discussed with the care team.    A/P, imaging studies, laboratory data, medications and family situation reviewed.    Chel Anglin MD  Attending Neonatologist        Superior mesenteric artery thrombosis

## 2024-01-01 NOTE — PROGRESS NOTE BEHAVIORAL HEALTH - MUSCLE TONE / STRENGTH
Called patients parent to schedule tongue tie left a detailed voicemail.  
Normal muscle tone/strength

## 2024-01-23 NOTE — PROGRESS NOTE ADULT - PROBLEM SELECTOR PLAN 3
Nima Abad MD is present. Patient of Margarita Jimenez NP.     UROLOGY OFFICE VISIT    SUBJECTIVE  Candelario Cronin is a 84 year old male who comes in today for a follow-up on symptoms of prostatitis.  Patient is known to our clinic for 1st developing symptoms of frequency, nocturia, and dysuria a couple of months ago now.  He did see his primary care provider who suspected a UTI but UA was negative.  Patient was treated by PCP with 10 days of ciprofloxacin 250 mg twice daily.  Patient saw me on 12/13/2023 with no improvement in symptoms.  Recommended ciprofloxacin 250 mg twice daily for 20 more days for a total of 30 days per recommended guidelines for treatment of the prostatitis.    Today, he states that he has been feeling much better and has not had any dysuria for weeks now.  He denies any penile pain, hematuria, perineal pain, nausea, vomiting, fevers, and chills.  He does state that he has been experiencing some constipation but has been taking Metamucil almost every day.  He states that he continues to get up anywhere from 1 time to several times at night.  His wife states that he tends to drink a whole glass of water in the middle of the night and wonders if he should stop doing that.  Patient states he is not drinking a whole glass of water because he is thirsty or has a dry mouth but because he wants to stay hydrated.  He continues to stay on tamsulosin 0.4 mg nightly.    Creatinine and GFR have since improved since August.  On 12/17/2023 creatinine was noted to be 1.01 and GFR was 67.       Past medical history    Atherosclerotic heart disease                                 Hyperlipidemia                                                DDD (degenerative disc disease)                               BPH (benign prostatic hypertrophy)                            Unstable angina (CMD)                                         DA (degenerative arthritis)                                   HTN  (hypertension)                                            Cellulitis                                      8/16/15         Comment: right elbow I & D Dr Homero Monroe    Retinal break of right eye                      2/10          PVD (posterior vitreous detachment), both eyes                Variants of migraine                            1/91          Bilateral pseudophakia                                        Dermatochalasis                                               S/P CABG (coronary artery bypass graft)         2005          Depression                                                    Anxiety                                                       Chest pain                                                    ASHD (arteriosclerotic heart disease)                         Migraines                                                     Constipation                                                  BMI 30.0-30.9,adult                                         Past surgical history    LAPAROSCOPY, CHOLECYSTECTOMY                    04/18/2001      Comment: Cholecystectomy, laparoscopic    PTCA                                            12/02/2003      Comment: single vessel percutaneous    LEFT HEART CATH,PERCUTANEOUS                    12/15/2005      Comment: Cardiac Cath    CORONARY ARTERY BYPASS GRAFT                    12/19/2005      Comment: 3 coronary arteries, single internal                mammary-coronary artery bypass    COLONOSCOPY DIAGNOSTIC                          01/01/2006      Comment: Colonoscopy, Dx    LEFT HEART CATH,PERCUTANEOUS                    03/16/2006      Comment: Cardiac Cath    LEFT HEART CATH,PERCUTANEOUS                    02/17/2012    SHOULDER SURGERY                                11/11/2008      Comment: Right    CORONARY ANGIOGRAM - CV                         01/10/2008    APPENDECTOMY                                                  CATARACT EXTRACTION W/  INTRAOCULAR LENS  IMPLA* [id number]*    GALLBLADDER SURGERY                                             Comment: removed    ROTATOR CUFF REPAIR                             11/11/08        Comment: Dr. Garcia/ [facility]    JOINT REPLACEMENT                                               Comment: right partial knee replacement and right                rotator cuff repair    JOINT REPLACEMENT                               11/09/2010      Comment: Left partial knee replacement Dr. Huang/     JAYSHREEUC RETINAL LESN,PHOTOCOAG                  2/19/2010       Comment: laser to retinal tear    COLONOSCOPY                                     11-29-11        Comment: Dr. Howell at [facility]    COLONOSCOPY                                     01/23/2020      Comment: Dr Borges does not recommend any repeat    ENDARTERECTOMY                                  01/31/2020      Comment: Left Carotid Endarterectomy  Outpatient Medications  Current Outpatient Medications   Medication Sig    ALPRAZolam (XANAX) 0.5 MG tablet Take 1 tablet by mouth 3 times daily as needed for Anxiety. For anxiety    ezetimibe (ZETIA) 10 MG tablet Take 1 tablet by mouth nightly.    diclofenac (VOLTAREN) 1 % gel Apply 4 g topically 4 times daily as needed (Pain).    sertraline (ZOLOFT) 50 MG tablet Take 1 tablet by mouth daily.    Ranolazine 1000 MG TABLET SR 12 HR Take 1 tablet by mouth in the morning and 1 tablet in the evening.    omega-3 acid ethyl esters (LOVAZA) 1 g capsule Take 1 capsule by mouth daily.    tamsulosin (Flomax) 0.4 MG Cap Take 1 capsule by mouth daily.    metoPROLOL succinate (TOPROL-XL) 50 MG 24 hr tablet One daily    fluorouracil (EFUDEX) 5 % cream APPLY BY TOPICAL ROUTE TWICE DAILY TO LEFT UPPER FOREHEAD FOR 2-4 WEEKS    nitroGLYCERIN (NITROSTAT) 0.4 MG sublingual tablet PLACE 1 TABLET UNDER THE TONGUE EVERY 5 MINUTES AS NEEDED FOR CHEST PAIN. DO NOT EXCEED A TOTAL OF 3 DOSES IN 15 MINUTES    rosuvastatin (CRESTOR) 20 MG tablet Take 1  tablet by mouth once daily    cilostazol (PLETAL) 50 MG tablet Take 2 tablets by mouth in the morning and 2 tablets in the evening.    aspirin 81 MG chewable tablet Chew 1 tablet by mouth daily. Do not start before 2022.    Misc Natural Products (Glucosamine Chond MSM Formula) Tab Take 1 tablet by mouth daily.    Misc Natural Products (Urinozinc Prostate) Cap Take 1 capsule by mouth daily.    Apoaequorin 20 MG Cap Take by mouth daily. Indications: prevagen    coenzyme Q10 100 MG capsule Take 100 mg by mouth daily.    acetaminophen (TYLENOL) 325 MG tablet Take 650 mg by mouth every 6 hours as needed for Pain.    Ascorbic Acid (VITAMIN C) 1000 MG tablet Take 1,000 mg by mouth daily.    Cholecalciferol (VITAMIN D3) 1000 units capsule Take 1,000 Units by mouth daily.     No current facility-administered medications for this visit.     Allergies  ALLERGIES:   Allergen Reactions    Lisinopril Cough     Family history  Family History   Problem Relation Age of Onset    Heart disease Mother          at 83    Hypertension Mother     Osteoarthritis Mother     Osteoarthritis Father     Heart disease Father          at 86, thoracaneurysm    Osteoarthritis Brother     Diabetes Brother     Heart disease Brother     Hypertension Brother     Hyperlipidemia Brother     Heart disease Son         by pass surgery     Social history  Social History     Occupational History    Not on file   Tobacco Use    Smoking status: Never    Smokeless tobacco: Never   Vaping Use    Vaping Use: never used   Substance and Sexual Activity    Alcohol use: Yes     Alcohol/week: 1.0 standard drink of alcohol     Types: 1 Standard drinks or equivalent per week     Comment: Social, Less than 1 per week    Drug use: No    Sexual activity: Never         OBJECTIVE  Vital Signs:   Height 5' 5.5\" (1.664 m), weight 80.3 kg (177 lb).   General: The patient is well developed, well nourished. No acute distress, nontoxic. Appears stated age.    Neurologic: Alert and oriented. Normal mood and affect.   Skin: Warm and dry. Exposed areas without rash.   HEENT: Normocephalic, atraumatic.   Neck: Supple and symmetric without swelling or tenderness.   Back: Pt walks with normal gait. No obvious kyphosis or scoliosis. No CVA tenderness bilaterally. No spinal tenderness.  Abdomen:  Abdomen is soft, non-tender, non-distended.   Extremities: No Clubbing, cyanosis, or edema.     LABS/IMAGING  Microscopic urinalysis:  Office Visit on 01/23/2024   Component Date Value    BLDR Scan mLs 01/23/2024 10 ml         All available tests, imaging, and procedures were reviewed in the office with the patient today.    Assessment:   1. Dysuria, seemingly resolved  2. Urinary frequency, seemingly resolved  3. Nocturia    Plan:   PVR was 10 mL  Patient was unable to provide urine sample today.    Continue with tamsulosin 0.4 mg daily as patient is not having any side effects.  Ordered by PCP.    Since patient's symptoms have seemingly resolved, likely due to prostate infection that was treated with the antibiotic.  If he does develop symptoms again he should contact our office.  Otherwise, avoid bladder irritants such as caffeine and alcohol that may increase frequency.  I also recommended limiting fluids 2 hours before bedtime to help reduce nocturia.    I also recommend using over-the-counter supplements such as MiraLax for constipation.  Constipation can play a role in urinary retention as well as many other things.  It is good to have regular bowel movements.    Recommend patient follow up as needed, or sooner if problems or questions.      Margarita Jimenez NP  [facility]   likely urinary source  follow up cultures  continue empiric ceftriaxone  s/p empiric vancomycin

## 2024-02-18 NOTE — PROGRESS NOTE ADULT - PROBLEM SELECTOR PLAN 4
Iraqi intermittent h/h drop s/p total 3units prbc (last 3/6)  no s/s of active GI bleed  monitor h/h

## 2024-02-22 NOTE — PROGRESS NOTE ADULT - PROBLEM SELECTOR PROBLEM 6
Pt states that pharm stated he had no refills on levemir. Advised I will reach out to them to see what the problem may be   Hyperthyroidism Sepsis due to Enterobacter species

## 2024-03-14 NOTE — PHYSICAL THERAPY INITIAL EVALUATION ADULT - ASR WT BEARING STATUS EVAL
Patient admitted 03/11/24 following R thoracotomy with mesh removal. APS consulted for WILFREDO placement and acute pain control. Tolerated procedure well. Post-operative course uncomplicated, WILFREDO catheters discontinued POD2 and transitioned to PO pain regimen. Pain controlled. POD3 patient meeting appropriate milestones for discharge home. POD4 Discharge home following improved pain control and reassurance   
no weight-bearing restrictions

## 2024-03-14 NOTE — PROGRESS NOTE ADULT - PROBLEM SELECTOR PLAN 1
Medical Necessity Information: It is in your best interest to select a reason for this procedure from the list below. All of these items fulfill various CMS LCD requirements except lesion extends to a margin. Include Z78.9 (Other Specified Conditions Influencing Health Status) As An Associated Diagnosis?: No Medical Necessity Clause: This procedure was medically necessary because the lesion that was treated was: Lab: [id number] Lab Facility: 0 - continue vent   - suction prn   - continue with trach site care, keep dry and clean   - call ENT with any issues. Surgeon (Optional): Luis Alberto rodriguez Biopsy Photograph Reviewed: Yes Size Of Lesion In Cm: 0.7 Size Of Margin In Cm: 0.3 Eye Clamp Note Details: An eye clamp was used during the procedure. Excision Method: Elliptical Saucerization Depth: dermis and superficial adipose tissue Repair Type: Intermediate Intermediate / Complex Repair - Final Wound Length In Cm: 3.2 Suturegard Retention Suture: 2-0 Nylon Retention Suture Bite Size: 3 mm Length To Time In Minutes Device Was In Place: 10 Number Of Hemigard Strips Per Side: 1 Undermining Type: Entire Wound Debridement Text: The wound edges were debrided prior to proceeding with the closure to facilitate wound healing. Helical Rim Text: The closure involved the helical rim. Vermilion Border Text: The closure involved the vermilion border. Nostril Rim Text: The closure involved the nostril rim. Retention Suture Text: Retention sutures were placed to support the closure and prevent dehiscence. Primary Defect Width (In Cm): 1.3 Suture Removal: 14 days Epidermal Closure Graft Donor Site (Optional): simple interrupted Graft Donor Site Bandage (Optional-Leave Blank If You Don't Want In Note): Steri-strips and a pressure bandage were applied to the donor site. Detail Level: Detailed Excision Depth: adipose tissue Scalpel Size: 15 blade Anesthesia Type: 1% lidocaine with epinephrine Hemostasis: Electrocautery Estimated Blood Loss (Cc): minimal Deep Sutures: 3-0 PGCL Epidermal Sutures: 3-0 Nylon Epidermal Closure: running Wound Care: Petrolatum Dressing: dry sterile dressing Suturegard Intro: Intraoperative tissue expansion was performed, utilizing the SUTUREGARD device, in order to reduce wound tension. Suturegard Body: The suture ends were repeatedly re-tightened and re-clamped to achieve the desired tissue expansion. Hemigard Intro: Due to skin fragility and wound tension, it was decided to use HEMIGARD adhesive retention suture devices to permit a linear closure. The skin was cleaned and dried for a 6cm distance away from the wound. Excessive hair, if present, was removed to allow for adhesion. Hemigard Postcare Instructions: The HEMIGARD strips are to remain completely dry for at least 5-7 days. Positioning (Leave Blank If You Do Not Want): The patient was placed in a comfortable position exposing the surgical site. Complex Repair Preamble Text (Leave Blank If You Do Not Want): Extensive wide undermining was performed. Intermediate Repair Preamble Text (Leave Blank If You Do Not Want): Undermining was performed with blunt dissection. Fusiform Excision Additional Text (Leave Blank If You Do Not Want): The margin was drawn around the clinically apparent lesion.  A fusiform shape was then drawn on the skin incorporating the lesion and margins.  Incisions were then made along these lines to the appropriate tissue plane and the lesion was extirpated. Eliptical Excision Additional Text (Leave Blank If You Do Not Want): The margin was drawn around the clinically apparent lesion.  An elliptical shape was then drawn on the skin incorporating the lesion and margins.  Incisions were then made along these lines to the appropriate tissue plane and the lesion was extirpated. Saucerization Excision Additional Text (Leave Blank If You Do Not Want): The margin was drawn around the clinically apparent lesion.  Incisions were then made along these lines, in a tangential fashion, to the appropriate tissue plane and the lesion was extirpated. Slit Excision Additional Text (Leave Blank If You Do Not Want): A linear line was drawn on the skin overlying the lesion. An incision was made slowly until the lesion was visualized.  Once visualized, the lesion was removed with blunt dissection. Excisional Biopsy Additional Text (Leave Blank If You Do Not Want): The margin was drawn around the clinically apparent lesion. An elliptical shape was then drawn on the skin incorporating the lesion and margins.  Incisions were then made along these lines to the appropriate tissue plane and the lesion was extirpated. Perilesional Excision Additional Text (Leave Blank If You Do Not Want): The margin was drawn around the clinically apparent lesion. Incisions were then made along these lines to the appropriate tissue plane and the lesion was extirpated. Repair Performed By Another Provider Text (Leave Blank If You Do Not Want): After the tissue was excised the defect was repaired by another provider. No Repair - Repaired With Adjacent Surgical Defect Text (Leave Blank If You Do Not Want): After the excision the defect was repaired concurrently with another surgical defect which was in close approximation. Adjacent Tissue Transfer Text: The defect edges were debeveled with a #15 scalpel blade. Given the location of the defect and the proximity to free margins an adjacent tissue transfer was deemed most appropriate. Using a sterile surgical marker, an appropriate flap was drawn incorporating the defect and placing the expected incisions within the relaxed skin tension lines where possible. The area thus outlined was incised deep to adipose tissue with a #15 scalpel blade. The skin margins were undermined to an appropriate distance in all directions utilizing iris scissors and carried over to close the primary defect. Advancement Flap (Single) Text: The defect edges were debeveled with a #15 scalpel blade. Given the location of the defect and the proximity to free margins a single advancement flap was deemed most appropriate. Using a sterile surgical marker, an appropriate advancement flap was drawn incorporating the defect and placing the expected incisions within the relaxed skin tension lines where possible. The area thus outlined was incised deep to adipose tissue with a #15 scalpel blade. The skin margins were undermined to an appropriate distance in all directions utilizing iris scissors. Following this, the designed flap was advanced and carried over into the primary defect and sutured into place. Advancement Flap (Double) Text: The defect edges were debeveled with a #15 scalpel blade. Given the location of the defect and the proximity to free margins a double advancement flap was deemed most appropriate. Using a sterile surgical marker, the appropriate advancement flaps were drawn incorporating the defect and placing the expected incisions within the relaxed skin tension lines where possible. The area thus outlined was incised deep to adipose tissue with a #15 scalpel blade. The skin margins were undermined to an appropriate distance in all directions utilizing iris scissors. Following this, the designed flaps were advanced and carried over into the primary defect and sutured into place. Burow's Advancement Flap Text: The defect edges were debeveled with a #15 scalpel blade. Given the location of the defect and the proximity to free margins a Burow's advancement flap was deemed most appropriate. Using a sterile surgical marker, the appropriate advancement flap was drawn incorporating the defect and placing the expected incisions within the relaxed skin tension lines where possible. The area thus outlined was incised deep to adipose tissue with a #15 scalpel blade. The skin margins were undermined to an appropriate distance in all directions utilizing iris scissors. Following this, the designed flap was advanced and carried over into the primary defect and sutured into place. Chonodrocutaneous Helical Advancement Flap Text: The defect edges were debeveled with a #15 scalpel blade. Given the location of the defect and the proximity to free margins a chondrocutaneous helical advancement flap was deemed most appropriate. Using a sterile surgical marker, the appropriate advancement flap was drawn incorporating the defect and placing the expected incisions within the relaxed skin tension lines where possible. The area thus outlined was incised deep to adipose tissue with a #15 scalpel blade. The skin margins were undermined to an appropriate distance in all directions utilizing iris scissors. Following this, the designed flap was advanced and carried over into the primary defect and sutured into place. Crescentic Advancement Flap Text: The defect edges were debeveled with a #15 scalpel blade. Given the location of the defect and the proximity to free margins a crescentic advancement flap was deemed most appropriate. Using a sterile surgical marker, the appropriate advancement flap was drawn incorporating the defect and placing the expected incisions within the relaxed skin tension lines where possible. The area thus outlined was incised deep to adipose tissue with a #15 scalpel blade. The skin margins were undermined to an appropriate distance in all directions utilizing iris scissors. Following this, the designed flap was advanced and carried over into the primary defect and sutured into place. A-T Advancement Flap Text: The defect edges were debeveled with a #15 scalpel blade. Given the location of the defect, shape of the defect and the proximity to free margins an A-T advancement flap was deemed most appropriate. Using a sterile surgical marker, an appropriate advancement flap was drawn incorporating the defect and placing the expected incisions within the relaxed skin tension lines where possible. The area thus outlined was incised deep to adipose tissue with a #15 scalpel blade. The skin margins were undermined to an appropriate distance in all directions utilizing iris scissors. Following this, the designed flap was advanced and carried over into the primary defect and sutured into place. O-T Advancement Flap Text: The defect edges were debeveled with a #15 scalpel blade. Given the location of the defect, shape of the defect and the proximity to free margins an O-T advancement flap was deemed most appropriate. Using a sterile surgical marker, an appropriate advancement flap was drawn incorporating the defect and placing the expected incisions within the relaxed skin tension lines where possible. The area thus outlined was incised deep to adipose tissue with a #15 scalpel blade. The skin margins were undermined to an appropriate distance in all directions utilizing iris scissors. Following this, the designed flap was advanced and carried over into the primary defect and sutured into place. O-L Flap Text: The defect edges were debeveled with a #15 scalpel blade. Given the location of the defect, shape of the defect and the proximity to free margins an O-L flap was deemed most appropriate. Using a sterile surgical marker, an appropriate advancement flap was drawn incorporating the defect and placing the expected incisions within the relaxed skin tension lines where possible. The area thus outlined was incised deep to adipose tissue with a #15 scalpel blade. The skin margins were undermined to an appropriate distance in all directions utilizing iris scissors. Following this, the designed flap was advanced and carried over into the primary defect and sutured into place. O-Z Flap Text: The defect edges were debeveled with a #15 scalpel blade. Given the location of the defect, shape of the defect and the proximity to free margins an O-Z flap was deemed most appropriate. Using a sterile surgical marker, an appropriate transposition flap was drawn incorporating the defect and placing the expected incisions within the relaxed skin tension lines where possible. The area thus outlined was incised deep to adipose tissue with a #15 scalpel blade. The skin margins were undermined to an appropriate distance in all directions utilizing iris scissors. Following this, the designed flap was carried over into the primary defect and sutured into place. Double O-Z Flap Text: The defect edges were debeveled with a #15 scalpel blade. Given the location of the defect, shape of the defect and the proximity to free margins a Double O-Z flap was deemed most appropriate. Using a sterile surgical marker, an appropriate transposition flap was drawn incorporating the defect and placing the expected incisions within the relaxed skin tension lines where possible. The area thus outlined was incised deep to adipose tissue with a #15 scalpel blade. The skin margins were undermined to an appropriate distance in all directions utilizing iris scissors. Following this, the designed flap was carried over into the primary defect and sutured into place. V-Y Flap Text: The defect edges were debeveled with a #15 scalpel blade. Given the location of the defect, shape of the defect and the proximity to free margins a V-Y flap was deemed most appropriate. Using a sterile surgical marker, an appropriate advancement flap was drawn incorporating the defect and placing the expected incisions within the relaxed skin tension lines where possible. The area thus outlined was incised deep to adipose tissue with a #15 scalpel blade. The skin margins were undermined to an appropriate distance in all directions utilizing iris scissors. Following this, the designed flap was advanced and carried over into the primary defect and sutured into place. Advancement-Rotation Flap Text: The defect edges were debeveled with a #15 scalpel blade. Given the location of the defect, shape of the defect and the proximity to free margins an advancement-rotation flap was deemed most appropriate. Using a sterile surgical marker, an appropriate flap was drawn incorporating the defect and placing the expected incisions within the relaxed skin tension lines where possible. The area thus outlined was incised deep to adipose tissue with a #15 scalpel blade. The skin margins were undermined to an appropriate distance in all directions utilizing iris scissors. Following this, the designed flap was carried over into the primary defect and sutured into place. Mercedes Flap Text: The defect edges were debeveled with a #15 scalpel blade. Given the location of the defect, shape of the defect and the proximity to free margins a Mercedes flap was deemed most appropriate. Using a sterile surgical marker, an appropriate advancement flap was drawn incorporating the defect and placing the expected incisions within the relaxed skin tension lines where possible. The area thus outlined was incised deep to adipose tissue with a #15 scalpel blade. The skin margins were undermined to an appropriate distance in all directions utilizing iris scissors. Following this, the designed flap was advanced and carried over into the primary defect and sutured into place. Modified Advancement Flap Text: The defect edges were debeveled with a #15 scalpel blade. Given the location of the defect, shape of the defect and the proximity to free margins a modified advancement flap was deemed most appropriate. Using a sterile surgical marker, an appropriate advancement flap was drawn incorporating the defect and placing the expected incisions within the relaxed skin tension lines where possible. The area thus outlined was incised deep to adipose tissue with a #15 scalpel blade. The skin margins were undermined to an appropriate distance in all directions utilizing iris scissors. Following this, the designed flap was advanced and carried over into the primary defect and sutured into place. Mucosal Advancement Flap Text: Given the location of the defect, shape of the defect and the proximity to free margins a mucosal advancement flap was deemed most appropriate. Incisions were made with a 15 blade scalpel in the appropriate fashion along the cutaneous vermilion border and the mucosal lip. The remaining actinically damaged mucosal tissue was excised.  The mucosal advancement flap was then elevated to the gingival sulcus with care taken to preserve the neurovascular structures and advanced into the primary defect. Care was taken to ensure that precise realignment of the vermilion border was achieved. Peng Advancement Flap Text: The defect edges were debeveled with a #15 scalpel blade. Given the location of the defect, shape of the defect and the proximity to free margins a Peng advancement flap was deemed most appropriate. Using a sterile surgical marker, an appropriate advancement flap was drawn incorporating the defect and placing the expected incisions within the relaxed skin tension lines where possible. The area thus outlined was incised deep to adipose tissue with a #15 scalpel blade. The skin margins were undermined to an appropriate distance in all directions utilizing iris scissors. Following this, the designed flap was advanced and carried over into the primary defect and sutured into place. Hatchet Flap Text: The defect edges were debeveled with a #15 scalpel blade. Given the location of the defect, shape of the defect and the proximity to free margins a hatchet flap was deemed most appropriate. Using a sterile surgical marker, an appropriate hatchet flap was drawn incorporating the defect and placing the expected incisions within the relaxed skin tension lines where possible. The area thus outlined was incised deep to adipose tissue with a #15 scalpel blade. The skin margins were undermined to an appropriate distance in all directions utilizing iris scissors. Following this, the designed flap was carried over into the primary defect and sutured into place. Rotation Flap Text: The defect edges were debeveled with a #15 scalpel blade. Given the location of the defect, shape of the defect and the proximity to free margins a rotation flap was deemed most appropriate. Using a sterile surgical marker, an appropriate rotation flap was drawn incorporating the defect and placing the expected incisions within the relaxed skin tension lines where possible. The area thus outlined was incised deep to adipose tissue with a #15 scalpel blade. The skin margins were undermined to an appropriate distance in all directions utilizing iris scissors. Following this, the designed flap was carried over into the primary defect and sutured into place. Bilateral Rotation Flap Text: The defect edges were debeveled with a #15 scalpel blade. Given the location of the defect, shape of the defect and the proximity to free margins a bilateral rotation flap was deemed most appropriate. Using a sterile surgical marker, an appropriate rotation flap was drawn incorporating the defect and placing the expected incisions within the relaxed skin tension lines where possible. The area thus outlined was incised deep to adipose tissue with a #15 scalpel blade. The skin margins were undermined to an appropriate distance in all directions utilizing iris scissors. Following this, the designed flap was carried over into the primary defect and sutured into place. Spiral Flap Text: The defect edges were debeveled with a #15 scalpel blade. Given the location of the defect, shape of the defect and the proximity to free margins a spiral flap was deemed most appropriate. Using a sterile surgical marker, an appropriate rotation flap was drawn incorporating the defect and placing the expected incisions within the relaxed skin tension lines where possible. The area thus outlined was incised deep to adipose tissue with a #15 scalpel blade. The skin margins were undermined to an appropriate distance in all directions utilizing iris scissors. Following this, the designed flap was carried over into the primary defect and sutured into place. Staged Advancement Flap Text: The defect edges were debeveled with a #15 scalpel blade. Given the location of the defect, shape of the defect and the proximity to free margins a staged advancement flap was deemed most appropriate. Using a sterile surgical marker, an appropriate advancement flap was drawn incorporating the defect and placing the expected incisions within the relaxed skin tension lines where possible. The area thus outlined was incised deep to adipose tissue with a #15 scalpel blade. The skin margins were undermined to an appropriate distance in all directions utilizing iris scissors. Following this, the designed flap was carried over into the primary defect and sutured into place. Star Wedge Flap Text: The defect edges were debeveled with a #15 scalpel blade. Given the location of the defect, shape of the defect and the proximity to free margins a star wedge flap was deemed most appropriate. Using a sterile surgical marker, an appropriate rotation flap was drawn incorporating the defect and placing the expected incisions within the relaxed skin tension lines where possible. The area thus outlined was incised deep to adipose tissue with a #15 scalpel blade. The skin margins were undermined to an appropriate distance in all directions utilizing iris scissors. Following this, the designed flap was carried over into the primary defect and sutured into place. Transposition Flap Text: The defect edges were debeveled with a #15 scalpel blade. Given the location of the defect and the proximity to free margins a transposition flap was deemed most appropriate. Using a sterile surgical marker, an appropriate transposition flap was drawn incorporating the defect. The area thus outlined was incised deep to adipose tissue with a #15 scalpel blade. The skin margins were undermined to an appropriate distance in all directions utilizing iris scissors. Following this, the designed flap was carried over into the primary defect and sutured into place. Muscle Hinge Flap Text: The defect edges were debeveled with a #15 scalpel blade.  Given the size, depth and location of the defect and the proximity to free margins a muscle hinge flap was deemed most appropriate. Using a sterile surgical marker, an appropriate hinge flap was drawn incorporating the defect. The area thus outlined was incised with a #15 scalpel blade. The skin margins were undermined to an appropriate distance in all directions utilizing iris scissors. Following this, the designed flap was carried into the primary defect and sutured into place. Mustarde Flap Text: The defect edges were debeveled with a #15 scalpel blade.  Given the size, depth and location of the defect and the proximity to free margins a Mustarde flap was deemed most appropriate. Using a sterile surgical marker, an appropriate flap was drawn incorporating the defect. The area thus outlined was incised with a #15 scalpel blade. The skin margins were undermined to an appropriate distance in all directions utilizing iris scissors. Following this, the designed flap was carried into the primary defect and sutured into place. Nasal Turnover Hinge Flap Text: The defect edges were debeveled with a #15 scalpel blade.  Given the size, depth, location of the defect and the defect being full thickness a nasal turnover hinge flap was deemed most appropriate. Using a sterile surgical marker, an appropriate hinge flap was drawn incorporating the defect. The area thus outlined was incised with a #15 scalpel blade. The flap was designed to recreate the nasal mucosal lining and the alar rim. The skin margins were undermined to an appropriate distance in all directions utilizing iris scissors. Following this, the designed flap was carried over into the primary defect and sutured into place Nasalis-Muscle-Based Myocutaneous Island Pedicle Flap Text: Using a #15 blade, an incision was made around the donor flap to the level of the nasalis muscle. Wide lateral undermining was then performed in both the subcutaneous plane above the nasalis muscle, and in a submuscular plane just above periosteum. This allowed the formation of a free nasalis muscle axial pedicle (based on the angular artery) which was still attached to the actual cutaneous flap, increasing its mobility and vascular viability. Hemostasis was obtained with pinpoint electrocoagulation. The flap was mobilized into position and the pivotal anchor points positioned and stabilized with buried interrupted sutures. Subcutaneous and dermal tissues were closed in a multilayered fashion with sutures. Tissue redundancies were excised, and the epidermal edges were apposed without significant tension and sutured with sutures. Nasalis Myocutaneous Flap Text: Using a #15 blade, an incision was made around the donor flap to the level of the nasalis muscle. Wide lateral undermining was then performed in both the subcutaneous plane above the nasalis muscle, and in a submuscular plane just above periosteum. This allowed the formation of a free nasalis muscle axial pedicle which was still attached to the actual cutaneous flap, increasing its mobility and vascular viability. Hemostasis was obtained with pinpoint electrocoagulation. The flap was mobilized into position and the pivotal anchor points positioned and stabilized with buried interrupted sutures. Subcutaneous and dermal tissues were closed in a multilayered fashion with sutures. Tissue redundancies were excised, and the epidermal edges were apposed without significant tension and sutured with sutures. Orbicularis Oris Muscle Flap Text: The defect edges were debeveled with a #15 scalpel blade.  Given that the defect affected the competency of the oral sphincter an orbicularis oris muscle flap was deemed most appropriate to restore this competency and normal muscle function.  Using a sterile surgical marker, an appropriate flap was drawn incorporating the defect. The area thus outlined was incised with a #15 scalpel blade. Following this, the designed flap was carried over into the primary defect and sutured into place. Melolabial Transposition Flap Text: The defect edges were debeveled with a #15 scalpel blade. Given the location of the defect and the proximity to free margins a melolabial flap was deemed most appropriate. Using a sterile surgical marker, an appropriate melolabial transposition flap was drawn incorporating the defect. The area thus outlined was incised deep to adipose tissue with a #15 scalpel blade. The skin margins were undermined to an appropriate distance in all directions utilizing iris scissors. Following this, the designed flap was carried over into the primary defect and sutured into place. Rectangular Flap Text: The defect edges were debeveled with a #15 scalpel blade. Given the location of the defect and the proximity to free margins a rectangular flap was deemed most appropriate. Using a sterile surgical marker, an appropriate rectangular flap was drawn incorporating the defect. The area thus outlined was incised deep to adipose tissue with a #15 scalpel blade. The skin margins were undermined to an appropriate distance in all directions utilizing iris scissors. Following this, the designed flap was carried over into the primary defect and sutured into place. Rhombic Flap Text: The defect edges were debeveled with a #15 scalpel blade. Given the location of the defect and the proximity to free margins a rhombic flap was deemed most appropriate. Using a sterile surgical marker, an appropriate rhombic flap was drawn incorporating the defect. The area thus outlined was incised deep to adipose tissue with a #15 scalpel blade. The skin margins were undermined to an appropriate distance in all directions utilizing iris scissors. Following this, the designed flap was carried over into the primary defect and sutured into place. Rhomboid Transposition Flap Text: The defect edges were debeveled with a #15 scalpel blade. Given the location of the defect and the proximity to free margins a rhomboid transposition flap was deemed most appropriate. Using a sterile surgical marker, an appropriate rhomboid flap was drawn incorporating the defect. The area thus outlined was incised deep to adipose tissue with a #15 scalpel blade. The skin margins were undermined to an appropriate distance in all directions utilizing iris scissors. Following this, the designed flap was carried over into the primary defect and sutured into place. Bi-Rhombic Flap Text: The defect edges were debeveled with a #15 scalpel blade. Given the location of the defect and the proximity to free margins a bi-rhombic flap was deemed most appropriate. Using a sterile surgical marker, an appropriate rhombic flap was drawn incorporating the defect. The area thus outlined was incised deep to adipose tissue with a #15 scalpel blade. The skin margins were undermined to an appropriate distance in all directions utilizing iris scissors. Following this, the designed flap was carried over into the primary defect and sutured into place. Helical Rim Advancement Flap Text: The defect edges were debeveled with a #15 blade scalpel.  Given the location of the defect and the proximity to free margins (helical rim) a double helical rim advancement flap was deemed most appropriate. Using a sterile surgical marker, the appropriate advancement flaps were drawn incorporating the defect and placing the expected incisions between the helical rim and antihelix where possible.  The area thus outlined was incised through and through with a #15 scalpel blade.  With a skin hook and iris scissors, the flaps were gently and sharply undermined and freed up. Folllowing this, the designed flaps were carried over into the primary defect and sutured into place. Bilateral Helical Rim Advancement Flap Text: The defect edges were debeveled with a #15 blade scalpel.  Given the location of the defect and the proximity to free margins (helical rim) a bilateral helical rim advancement flap was deemed most appropriate. Using a sterile surgical marker, the appropriate advancement flaps were drawn incorporating the defect and placing the expected incisions between the helical rim and antihelix where possible.  The area thus outlined was incised through and through with a #15 scalpel blade.  With a skin hook and iris scissors, the flaps were gently and sharply undermined and freed up. Following this, the designed flaps were placed into the primary defect and sutured into place. Ear Star Wedge Flap Text: The defect edges were debeveled with a #15 blade scalpel.  Given the location of the defect and the proximity to free margins (helical rim) an ear star wedge flap was deemed most appropriate. Using a sterile surgical marker, the appropriate flap was drawn incorporating the defect and placing the expected incisions between the helical rim and antihelix where possible.  The area thus outlined was incised through and through with a #15 scalpel blade. Following this, the designed flap was carried over into the primary defect and sutured into place. Banner Transposition Flap Text: The defect edges were debeveled with a #15 scalpel blade. Given the location of the defect and the proximity to free margins a Banner transposition flap was deemed most appropriate. Using a sterile surgical marker, an appropriate flap was drawn around the defect. The area thus outlined was incised deep to adipose tissue with a #15 scalpel blade. The skin margins were undermined to an appropriate distance in all directions utilizing iris scissors. Following this, the designed flap was carried into the primary defect and sutured into place. Bilobed Flap Text: The defect edges were debeveled with a #15 scalpel blade. Given the location of the defect and the proximity to free margins a bilobe flap was deemed most appropriate. Using a sterile surgical marker, an appropriate bilobe flap drawn around the defect. The area thus outlined was incised deep to adipose tissue with a #15 scalpel blade. The skin margins were undermined to an appropriate distance in all directions utilizing iris scissors. Following this, the designed flap was carried over into the primary defect and sutured into place. Bilobed Transposition Flap Text: The defect edges were debeveled with a #15 scalpel blade. Given the location of the defect and the proximity to free margins a bilobed transposition flap was deemed most appropriate. Using a sterile surgical marker, an appropriate bilobe flap drawn around the defect. The area thus outlined was incised deep to adipose tissue with a #15 scalpel blade. The skin margins were undermined to an appropriate distance in all directions utilizing iris scissors. Following this, the designed flap was carried over into the primary defect and sutured into place. Trilobed Flap Text: The defect edges were debeveled with a #15 scalpel blade. Given the location of the defect and the proximity to free margins a trilobed flap was deemed most appropriate. Using a sterile surgical marker, an appropriate trilobed flap was drawn around the defect. The area thus outlined was incised deep to adipose tissue with a #15 scalpel blade. The skin margins were undermined to an appropriate distance in all directions utilizing iris scissors. Following this, the designed flap was carried into the primary defect and sutured into place. Dorsal Nasal Flap Text: The defect edges were debeveled with a #15 scalpel blade. Given the location of the defect and the proximity to free margins a dorsal nasal flap was deemed most appropriate. Using a sterile surgical marker, an appropriate dorsal nasal flap was drawn around the defect. The area thus outlined was incised deep to adipose tissue with a #15 scalpel blade. The skin margins were undermined to an appropriate distance in all directions utilizing iris scissors. Following this, the designed flap was carried into the primary defect and sutured into place. Island Pedicle Flap Text: The defect edges were debeveled with a #15 scalpel blade. Given the location of the defect, shape of the defect and the proximity to free margins an island pedicle advancement flap was deemed most appropriate. Using a sterile surgical marker, an appropriate advancement flap was drawn incorporating the defect, outlining the appropriate donor tissue and placing the expected incisions within the relaxed skin tension lines where possible. The area thus outlined was incised deep to adipose tissue with a #15 scalpel blade. The skin margins were undermined to an appropriate distance in all directions around the primary defect and laterally outward around the island pedicle utilizing iris scissors.  There was minimal undermining beneath the pedicle flap. Following this, the flap was carried over into the primary defect and sutured into place. Island Pedicle Flap With Canthal Suspension Text: The defect edges were debeveled with a #15 scalpel blade. Given the location of the defect, shape of the defect and the proximity to free margins an island pedicle advancement flap was deemed most appropriate. Using a sterile surgical marker, an appropriate advancement flap was drawn incorporating the defect, outlining the appropriate donor tissue and placing the expected incisions within the relaxed skin tension lines where possible. The area thus outlined was incised deep to adipose tissue with a #15 scalpel blade. The skin margins were undermined to an appropriate distance in all directions around the primary defect and laterally outward around the island pedicle utilizing iris scissors.  There was minimal undermining beneath the pedicle flap. A suspension suture was placed in the canthal tendon to prevent tension and prevent ectropion. Following this, the designed flap was placed into the primary defect and sutured into place. Alar Island Pedicle Flap Text: The defect edges were debeveled with a #15 scalpel blade. Given the location of the defect, shape of the defect and the proximity to the alar rim an island pedicle advancement flap was deemed most appropriate. Using a sterile surgical marker, an appropriate advancement flap was drawn incorporating the defect, outlining the appropriate donor tissue and placing the expected incisions within the nasal ala running parallel to the alar rim. The area thus outlined was incised with a #15 scalpel blade. The skin margins were undermined minimally to an appropriate distance in all directions around the primary defect and laterally outward around the island pedicle utilizing iris scissors.  There was minimal undermining beneath the pedicle flap. Following this, the designed flap was carried over into the primary defect and sutured into place. Double Island Pedicle Flap Text: The defect edges were debeveled with a #15 scalpel blade. Given the location of the defect, shape of the defect and the proximity to free margins a double island pedicle advancement flap was deemed most appropriate. Using a sterile surgical marker, an appropriate advancement flap was drawn incorporating the defect, outlining the appropriate donor tissue and placing the expected incisions within the relaxed skin tension lines where possible. The area thus outlined was incised deep to adipose tissue with a #15 scalpel blade. The skin margins were undermined to an appropriate distance in all directions around the primary defect and laterally outward around the island pedicle utilizing iris scissors.  There was minimal undermining beneath the pedicle flap. Following this, the flap was carried over into the primary defect and sutured into place. Island Pedicle Flap-Requiring Vessel Identification Text: The defect edges were debeveled with a #15 scalpel blade. Given the location of the defect, shape of the defect and the proximity to free margins an island pedicle advancement flap was deemed most appropriate. Using a sterile surgical marker, an appropriate advancement flap was drawn, based on the axial vessel mentioned above, incorporating the defect, outlining the appropriate donor tissue and placing the expected incisions within the relaxed skin tension lines where possible. The area thus outlined was incised deep to adipose tissue with a #15 scalpel blade. The skin margins were undermined to an appropriate distance in all directions around the primary defect and laterally outward around the island pedicle utilizing iris scissors.  There was minimal undermining beneath the pedicle flap. Following this, the designed flap was carried over into the primary defect and sutured into place. Keystone Flap Text: The defect edges were debeveled with a #15 scalpel blade. Given the location of the defect, shape of the defect a keystone flap was deemed most appropriate. Using a sterile surgical marker, an appropriate keystone flap was drawn incorporating the defect, outlining the appropriate donor tissue and placing the expected incisions within the relaxed skin tension lines where possible. The area thus outlined was incised deep to adipose tissue with a #15 scalpel blade. The skin margins were undermined to an appropriate distance in all directions around the primary defect and laterally outward around the flap utilizing iris scissors. Following this, the designed flap was carried into the primary defect and sutured into place. O-T Plasty Text: The defect edges were debeveled with a #15 scalpel blade. Given the location of the defect, shape of the defect and the proximity to free margins an O-T plasty was deemed most appropriate. Using a sterile surgical marker, an appropriate O-T plasty was drawn incorporating the defect and placing the expected incisions within the relaxed skin tension lines where possible. The area thus outlined was incised deep to adipose tissue with a #15 scalpel blade. The skin margins were undermined to an appropriate distance in all directions utilizing iris scissors. Following this, the designed flap was carried over into the primary defect and sutured into place. O-Z Plasty Text: The defect edges were debeveled with a #15 scalpel blade. Given the location of the defect, shape of the defect and the proximity to free margins an O-Z plasty (double transposition flap) was deemed most appropriate. Using a sterile surgical marker, the appropriate transposition flaps were drawn incorporating the defect and placing the expected incisions within the relaxed skin tension lines where possible. The area thus outlined was incised deep to adipose tissue with a #15 scalpel blade. The skin margins were undermined to an appropriate distance in all directions utilizing iris scissors. Hemostasis was achieved with electrocautery. The flaps were then transposed and carried over into place, one clockwise and the other counterclockwise, and anchored with interrupted buried subcutaneous sutures. Double O-Z Plasty Text: The defect edges were debeveled with a #15 scalpel blade. Given the location of the defect, shape of the defect and the proximity to free margins a Double O-Z plasty (double transposition flap) was deemed most appropriate. Using a sterile surgical marker, the appropriate transposition flaps were drawn incorporating the defect and placing the expected incisions within the relaxed skin tension lines where possible. The area thus outlined was incised deep to adipose tissue with a #15 scalpel blade. The skin margins were undermined to an appropriate distance in all directions utilizing iris scissors. Hemostasis was achieved with electrocautery. The flaps were then transposed and carried over into place, one clockwise and the other counterclockwise, and anchored with interrupted buried subcutaneous sutures. V-Y Plasty Text: The defect edges were debeveled with a #15 scalpel blade. Given the location of the defect, shape of the defect and the proximity to free margins an V-Y advancement flap was deemed most appropriate. Using a sterile surgical marker, an appropriate advancement flap was drawn incorporating the defect and placing the expected incisions within the relaxed skin tension lines where possible. The area thus outlined was incised deep to adipose tissue with a #15 scalpel blade. The skin margins were undermined to an appropriate distance in all directions utilizing iris scissors. Following this, the designed flap was advanced and carried over into the primary defect and sutured into place. H Plasty Text: Given the location of the defect, shape of the defect and the proximity to free margins a H-plasty was deemed most appropriate for repair. Using a sterile surgical marker, the appropriate advancement arms of the H-plasty were drawn incorporating the defect and placing the expected incisions within the relaxed skin tension lines where possible. The area thus outlined was incised deep to adipose tissue with a #15 scalpel blade. The skin margins were undermined to an appropriate distance in all directions utilizing iris scissors.  The opposing advancement arms were then advanced and carried over into place in opposite direction and anchored with interrupted buried subcutaneous sutures. W Plasty Text: The lesion was extirpated to the level of the fat with a #15 scalpel blade. Given the location of the defect, shape of the defect and the proximity to free margins a W-plasty was deemed most appropriate for repair. Using a sterile surgical marker, the appropriate transposition arms of the W-plasty were drawn incorporating the defect and placing the expected incisions within the relaxed skin tension lines where possible. The area thus outlined was incised deep to adipose tissue with a #15 scalpel blade. The skin margins were undermined to an appropriate distance in all directions utilizing iris scissors. The opposing transposition arms were then transposed and carried over into place in opposite direction and anchored with interrupted buried subcutaneous sutures. Z Plasty Text: The lesion was extirpated to the level of the fat with a #15 scalpel blade. Given the location of the defect, shape of the defect and the proximity to free margins a Z-plasty was deemed most appropriate for repair. Using a sterile surgical marker, the appropriate transposition arms of the Z-plasty were drawn incorporating the defect and placing the expected incisions within the relaxed skin tension lines where possible. The area thus outlined was incised deep to adipose tissue with a #15 scalpel blade. The skin margins were undermined to an appropriate distance in all directions utilizing iris scissors. The opposing transposition arms were then transposed and carried over into place in opposite direction and anchored with interrupted buried subcutaneous sutures. Double Z Plasty Text: The lesion was extirpated to the level of the fat with a #15 scalpel blade. Given the location of the defect, shape of the defect and the proximity to free margins a double Z-plasty was deemed most appropriate for repair. Using a sterile surgical marker, the appropriate transposition arms of the double Z-plasty were drawn incorporating the defect and placing the expected incisions within the relaxed skin tension lines where possible. The area thus outlined was incised deep to adipose tissue with a #15 scalpel blade. The skin margins were undermined to an appropriate distance in all directions utilizing iris scissors. The opposing transposition arms were then transposed and carried over into place in opposite direction and anchored with interrupted buried subcutaneous sutures. Zygomaticofacial Flap Text: Given the location of the defect, shape of the defect and the proximity to free margins a zygomaticofacial flap was deemed most appropriate for repair. Using a sterile surgical marker, the appropriate flap was drawn incorporating the defect and placing the expected incisions within the relaxed skin tension lines where possible. The area thus outlined was incised deep to adipose tissue with a #15 scalpel blade with preservation of a vascular pedicle.  The skin margins were undermined to an appropriate distance in all directions utilizing iris scissors. The flap was then carried over into the defect and anchored with interrupted buried subcutaneous sutures. Cheek Interpolation Flap Text: A decision was made to reconstruct the defect utilizing an interpolation axial flap and a staged reconstruction.  A telfa template was made of the defect.  This telfa template was then used to outline the Cheek Interpolation flap.  The donor area for the pedicle flap was then injected with anesthesia.  The flap was excised through the skin and subcutaneous tissue down to the layer of the underlying musculature.  The interpolation flap was carefully excised within this deep plane to maintain its blood supply.  The edges of the donor site were undermined.   The donor site was closed in a primary fashion.  The pedicle was then rotated into position and sutured.  Once the tube was sutured into place, adequate blood supply was confirmed with blanching and refill.  The pedicle was then wrapped with xeroform gauze and dressed appropriately with a telfa and gauze bandage to ensure continued blood supply and protect the attached pedicle. Cheek-To-Nose Interpolation Flap Text: A decision was made to reconstruct the defect utilizing an interpolation axial flap and a staged reconstruction.  A telfa template was made of the defect.  This telfa template was then used to outline the Cheek-To-Nose Interpolation flap.  The donor area for the pedicle flap was then injected with anesthesia.  The flap was excised through the skin and subcutaneous tissue down to the layer of the underlying musculature.  The interpolation flap was carefully excised within this deep plane to maintain its blood supply.  The edges of the donor site were undermined.   The donor site was closed in a primary fashion.  The pedicle was then rotated into position and sutured.  Once the tube was sutured into place, adequate blood supply was confirmed with blanching and refill.  The pedicle was then wrapped with xeroform gauze and dressed appropriately with a telfa and gauze bandage to ensure continued blood supply and protect the attached pedicle. Interpolation Flap Text: A decision was made to reconstruct the defect utilizing an interpolation axial flap and a staged reconstruction.  A telfa template was made of the defect.  This telfa template was then used to outline the interpolation flap.  The donor area for the pedicle flap was then injected with anesthesia.  The flap was excised through the skin and subcutaneous tissue down to the layer of the underlying musculature.  The interpolation flap was carefully excised within this deep plane to maintain its blood supply.  The edges of the donor site were undermined.   The donor site was closed in a primary fashion.  The pedicle was then rotated into position and sutured.  Once the tube was sutured into place, adequate blood supply was confirmed with blanching and refill.  The pedicle was then wrapped with xeroform gauze and dressed appropriately with a telfa and gauze bandage to ensure continued blood supply and protect the attached pedicle. Melolabial Interpolation Flap Text: A decision was made to reconstruct the defect utilizing an interpolation axial flap and a staged reconstruction.  A telfa template was made of the defect.  This telfa template was then used to outline the melolabial interpolation flap.  The donor area for the pedicle flap was then injected with anesthesia.  The flap was excised through the skin and subcutaneous tissue down to the layer of the underlying musculature.  The pedicle flap was carefully excised within this deep plane to maintain its blood supply.  The edges of the donor site were undermined.   The donor site was closed in a primary fashion.  The pedicle was then rotated into position and sutured.  Once the tube was sutured into place, adequate blood supply was confirmed with blanching and refill.  The pedicle was then wrapped with xeroform gauze and dressed appropriately with a telfa and gauze bandage to ensure continued blood supply and protect the attached pedicle. Mastoid Interpolation Flap Text: A decision was made to reconstruct the defect utilizing an interpolation axial flap and a staged reconstruction.  A telfa template was made of the defect.  This telfa template was then used to outline the mastoid interpolation flap.  The donor area for the pedicle flap was then injected with anesthesia.  The flap was excised through the skin and subcutaneous tissue down to the layer of the underlying musculature.  The pedicle flap was carefully excised within this deep plane to maintain its blood supply.  The edges of the donor site were undermined.   The donor site was closed in a primary fashion.  The pedicle was then rotated into position and sutured.  Once the tube was sutured into place, adequate blood supply was confirmed with blanching and refill.  The pedicle was then wrapped with xeroform gauze and dressed appropriately with a telfa and gauze bandage to ensure continued blood supply and protect the attached pedicle. Posterior Auricular Interpolation Flap Text: A decision was made to reconstruct the defect utilizing an interpolation axial flap and a staged reconstruction.  A telfa template was made of the defect.  This telfa template was then used to outline the posterior auricular interpolation flap.  The donor area for the pedicle flap was then injected with anesthesia.  The flap was excised through the skin and subcutaneous tissue down to the layer of the underlying musculature.  The pedicle flap was carefully excised within this deep plane to maintain its blood supply.  The edges of the donor site were undermined.   The donor site was closed in a primary fashion.  The pedicle was then rotated into position and sutured.  Once the tube was sutured into place, adequate blood supply was confirmed with blanching and refill.  The pedicle was then wrapped with xeroform gauze and dressed appropriately with a telfa and gauze bandage to ensure continued blood supply and protect the attached pedicle. Paramedian Forehead Flap Text: A decision was made to reconstruct the defect utilizing an interpolation axial flap and a staged reconstruction.  A telfa template was made of the defect.  This telfa template was then used to outline the paramedian forehead pedicle flap.  The donor area for the pedicle flap was then injected with anesthesia.  The flap was excised through the skin and subcutaneous tissue down to the layer of the underlying musculature.  The pedicle flap was carefully excised within this deep plane to maintain its blood supply.  The edges of the donor site were undermined.   The donor site was closed in a primary fashion.  The pedicle was then rotated into position and sutured.  Once the tube was sutured into place, adequate blood supply was confirmed with blanching and refill.  The pedicle was then wrapped with xeroform gauze and dressed appropriately with a telfa and gauze bandage to ensure continued blood supply and protect the attached pedicle. Abbe Flap (Upper To Lower Lip) Text: The defect of the lower lip was assessed and measured.  Given the location and size of the defect, an Abbe flap was deemed most appropriate. Using a sterile surgical marker, an appropriate Abbe flap was measured and drawn on the upper lip. Local anesthesia was then infiltrated.  A scalpel was then used to incise the upper lip through and through the skin, vermilion, muscle and mucosa, leaving the flap pedicled on the opposite side.  The flap was then rotated and transferred to the lower lip defect.  The flap was then sutured into place with a three layer technique, closing the orbicularis oris muscle layer with subcutaneous buried sutures, followed by a mucosal layer and an epidermal layer. Abbe Flap (Lower To Upper Lip) Text: The defect of the upper lip was assessed and measured.  Given the location and size of the defect, an Abbe flap was deemed most appropriate. Using a sterile surgical marker, an appropriate Abbe flap was measured and drawn on the lower lip. Local anesthesia was then infiltrated. A scalpel was then used to incise the upper lip through and through the skin, vermilion, muscle and mucosa, leaving the flap pedicled on the opposite side.  The flap was then rotated and transferred to the lower lip defect.  The flap was then sutured into place with a three layer technique, closing the orbicularis oris muscle layer with subcutaneous buried sutures, followed by a mucosal layer and an epidermal layer. Estlander Flap (Upper To Lower Lip) Text: The defect of the lower lip was assessed and measured.  Given the location and size of the defect, an Estlander flap was deemed most appropriate. Using a sterile surgical marker, an appropriate Estlander flap was measured and drawn on the upper lip. Local anesthesia was then infiltrated. A scalpel was then used to incise the lateral aspect of the flap, through skin, muscle and mucosa, leaving the flap pedicled medially.  The flap was then rotated and positioned to fill the lower lip defect.  The flap was then sutured into place with a three layer technique, closing the orbicularis oris muscle layer with subcutaneous buried sutures, followed by a mucosal layer and an epidermal layer. Lip Wedge Excision Repair Text: Given the location of the defect and the proximity to free margins a full thickness wedge repair was deemed most appropriate. Using a sterile surgical marker, the appropriate repair was drawn incorporating the defect and placing the expected incisions perpendicular to the vermilion border.  The vermilion border was also meticulously outlined to ensure appropriate reapproximation during the repair.  The area thus outlined was incised through and through with a #15 scalpel blade.  The muscularis and dermis were reaproximated with deep sutures following hemostasis. Care was taken to realign the vermilion border before proceeding with the superficial closure.  Once the vermilion was realigned the superfical and mucosal closure was finished. Ftsg Text: The defect edges were debeveled with a #15 scalpel blade. Given the location of the defect, shape of the defect and the proximity to free margins a full thickness skin graft was deemed most appropriate. Using a sterile surgical marker, the primary defect shape was transferred to the donor site. The area thus outlined was incised deep to adipose tissue with a #15 scalpel blade.  The harvested graft was then trimmed of adipose tissue until only dermis and epidermis was left.  The skin margins of the secondary defect were undermined to an appropriate distance in all directions utilizing iris scissors.  The secondary defect was closed with interrupted buried subcutaneous sutures.  The skin edges were then re-apposed with running  sutures.  The skin graft was then placed in the primary defect and oriented appropriately. Split-Thickness Skin Graft Text: The defect edges were debeveled with a #15 scalpel blade. Given the location of the defect, shape of the defect and the proximity to free margins a split thickness skin graft was deemed most appropriate. Using a sterile surgical marker, the primary defect shape was transferred to the donor site. The split thickness graft was then harvested.  The skin graft was then placed in the primary defect and oriented appropriately. Pinch Graft Text: The defect edges were debeveled with a #15 scalpel blade. Given the location of the defect, shape of the defect and the proximity to free margins a pinch graft was deemed most appropriate. Using a sterile surgical marker, the primary defect shape was transferred to the donor site. The area thus outlined was incised deep to adipose tissue with a #15 scalpel blade.  The harvested graft was then trimmed of adipose tissue until only dermis and epidermis was left. The skin margins of the secondary defect were undermined to an appropriate distance in all directions utilizing iris scissors.  The secondary defect was closed with interrupted buried subcutaneous sutures.  The skin edges were then re-apposed with running  sutures.  The skin graft was then placed in the primary defect and oriented appropriately. Burow's Graft Text: The defect edges were debeveled with a #15 scalpel blade. Given the location of the defect, shape of the defect, the proximity to free margins and the presence of a standing cone deformity a Burow's skin graft was deemed most appropriate. The standing cone was removed and this tissue was then trimmed to the shape of the primary defect. The adipose tissue was also removed until only dermis and epidermis were left.  The skin margins of the secondary defect were undermined to an appropriate distance in all directions utilizing iris scissors.  The secondary defect was closed with interrupted buried subcutaneous sutures.  The skin edges were then re-apposed with running  sutures.  The skin graft was then placed in the primary defect and oriented appropriately. Cartilage Graft Text: The defect edges were debeveled with a #15 scalpel blade. Given the location of the defect, shape of the defect, the fact the defect involved a full thickness cartilage defect a cartilage graft was deemed most appropriate.  An appropriate donor site was identified, cleansed, and anesthetized. The cartilage graft was then harvested and transferred to the recipient site, oriented appropriately and then sutured into place.  The secondary defect was then repaired using a primary closure. Composite Graft Text: The defect edges were debeveled with a #15 scalpel blade. Given the location of the defect, shape of the defect, the proximity to free margins and the fact the defect was full thickness a composite graft was deemed most appropriate.  The defect was outline and then transferred to the donor site.  A full thickness graft was then excised from the donor site. The graft was then placed in the primary defect, oriented appropriately and then sutured into place.  The secondary defect was then repaired using a primary closure. Epidermal Autograft Text: The defect edges were debeveled with a #15 scalpel blade. Given the location of the defect, shape of the defect and the proximity to free margins an epidermal autograft was deemed most appropriate. Using a sterile surgical marker, the primary defect shape was transferred to the donor site. The epidermal graft was then harvested.  The skin graft was then placed in the primary defect and oriented appropriately. Dermal Autograft Text: The defect edges were debeveled with a #15 scalpel blade. Given the location of the defect, shape of the defect and the proximity to free margins a dermal autograft was deemed most appropriate. Using a sterile surgical marker, the primary defect shape was transferred to the donor site. The area thus outlined was incised deep to adipose tissue with a #15 scalpel blade.  The harvested graft was then trimmed of adipose and epidermal tissue until only dermis was left.  The skin graft was then placed in the primary defect and oriented appropriately. Skin Substitute Text: The defect edges were debeveled with a #15 scalpel blade. Given the location of the defect, shape of the defect and the proximity to free margins a skin substitute graft was deemed most appropriate.  The graft material was trimmed to fit the size of the defect. The graft was then placed in the primary defect and oriented appropriately. Tissue Cultured Epidermal Autograft Text: The defect edges were debeveled with a #15 scalpel blade. Given the location of the defect, shape of the defect and the proximity to free margins a tissue cultured epidermal autograft was deemed most appropriate.  The graft was then trimmed to fit the size of the defect.  The graft was then placed in the primary defect and oriented appropriately. Xenograft Text: The defect edges were debeveled with a #15 scalpel blade. Given the location of the defect, shape of the defect and the proximity to free margins a xenograft was deemed most appropriate.  The graft was then trimmed to fit the size of the defect.  The graft was then placed in the primary defect and oriented appropriately. Purse String (Intermediate) Text: Given the location of the defect and the characteristics of the surrounding skin a purse string intermediate closure was deemed most appropriate.  Undermining was performed circumferentially around the surgical defect.  A purse string suture was then placed and tightened. Purse String (Simple) Text: Given the location of the defect and the characteristics of the surrounding skin a purse string simple closure was deemed most appropriate.  Undermining was performed circumferentially around the surgical defect.  A purse string suture was then placed and tightened. Complex Repair And Single Advancement Flap Text: The defect edges were debeveled with a #15 scalpel blade.  The primary defect was closed partially with a complex linear closure.  Given the location of the remaining defect, shape of the defect and the proximity to free margins a single advancement flap was deemed most appropriate for complete closure of the defect.  Using a sterile surgical marker, an appropriate advancement flap was drawn incorporating the defect and placing the expected incisions within the relaxed skin tension lines where possible. The area thus outlined was incised deep to adipose tissue with a #15 scalpel blade. The skin margins were undermined to an appropriate distance in all directions utilizing iris scissors and carried over to close the primary defect. Complex Repair And Double Advancement Flap Text: The defect edges were debeveled with a #15 scalpel blade.  The primary defect was closed partially with a complex linear closure.  Given the location of the remaining defect, shape of the defect and the proximity to free margins a double advancement flap was deemed most appropriate for complete closure of the defect.  Using a sterile surgical marker, an appropriate advancement flap was drawn incorporating the defect and placing the expected incisions within the relaxed skin tension lines where possible. The area thus outlined was incised deep to adipose tissue with a #15 scalpel blade. The skin margins were undermined to an appropriate distance in all directions utilizing iris scissors and carried over to close the primary defect. Complex Repair And Modified Advancement Flap Text: The defect edges were debeveled with a #15 scalpel blade.  The primary defect was closed partially with a complex linear closure.  Given the location of the remaining defect, shape of the defect and the proximity to free margins a modified advancement flap was deemed most appropriate for complete closure of the defect.  Using a sterile surgical marker, an appropriate advancement flap was drawn incorporating the defect and placing the expected incisions within the relaxed skin tension lines where possible. The area thus outlined was incised deep to adipose tissue with a #15 scalpel blade. The skin margins were undermined to an appropriate distance in all directions utilizing iris scissors and carried over to close the primary defect. Complex Repair And A-T Advancement Flap Text: The defect edges were debeveled with a #15 scalpel blade.  The primary defect was closed partially with a complex linear closure.  Given the location of the remaining defect, shape of the defect and the proximity to free margins an A-T advancement flap was deemed most appropriate for complete closure of the defect.  Using a sterile surgical marker, an appropriate advancement flap was drawn incorporating the defect and placing the expected incisions within the relaxed skin tension lines where possible. The area thus outlined was incised deep to adipose tissue with a #15 scalpel blade. The skin margins were undermined to an appropriate distance in all directions utilizing iris scissors and carried over to close the primary defect. Complex Repair And O-T Advancement Flap Text: The defect edges were debeveled with a #15 scalpel blade.  The primary defect was closed partially with a complex linear closure.  Given the location of the remaining defect, shape of the defect and the proximity to free margins an O-T advancement flap was deemed most appropriate for complete closure of the defect.  Using a sterile surgical marker, an appropriate advancement flap was drawn incorporating the defect and placing the expected incisions within the relaxed skin tension lines where possible. The area thus outlined was incised deep to adipose tissue with a #15 scalpel blade. The skin margins were undermined to an appropriate distance in all directions utilizing iris scissors and carried over to close the primary defect. Complex Repair And O-L Flap Text: The defect edges were debeveled with a #15 scalpel blade.  The primary defect was closed partially with a complex linear closure.  Given the location of the remaining defect, shape of the defect and the proximity to free margins an O-L flap was deemed most appropriate for complete closure of the defect.  Using a sterile surgical marker, an appropriate flap was drawn incorporating the defect and placing the expected incisions within the relaxed skin tension lines where possible. The area thus outlined was incised deep to adipose tissue with a #15 scalpel blade. The skin margins were undermined to an appropriate distance in all directions utilizing iris scissors and carried over to close the primary defect. Complex Repair And Bilobe Flap Text: The defect edges were debeveled with a #15 scalpel blade.  The primary defect was closed partially with a complex linear closure.  Given the location of the remaining defect, shape of the defect and the proximity to free margins a bilobe flap was deemed most appropriate for complete closure of the defect.  Using a sterile surgical marker, an appropriate advancement flap was drawn incorporating the defect and placing the expected incisions within the relaxed skin tension lines where possible. The area thus outlined was incised deep to adipose tissue with a #15 scalpel blade. The skin margins were undermined to an appropriate distance in all directions utilizing iris scissors and carried over to close the primary defect. Complex Repair And Melolabial Flap Text: The defect edges were debeveled with a #15 scalpel blade.  The primary defect was closed partially with a complex linear closure.  Given the location of the remaining defect, shape of the defect and the proximity to free margins a melolabial flap was deemed most appropriate for complete closure of the defect.  Using a sterile surgical marker, an appropriate advancement flap was drawn incorporating the defect and placing the expected incisions within the relaxed skin tension lines where possible. The area thus outlined was incised deep to adipose tissue with a #15 scalpel blade. The skin margins were undermined to an appropriate distance in all directions utilizing iris scissors and carried over to close the primary defect. Complex Repair And Rotation Flap Text: The defect edges were debeveled with a #15 scalpel blade.  The primary defect was closed partially with a complex linear closure.  Given the location of the remaining defect, shape of the defect and the proximity to free margins a rotation flap was deemed most appropriate for complete closure of the defect.  Using a sterile surgical marker, an appropriate advancement flap was drawn incorporating the defect and placing the expected incisions within the relaxed skin tension lines where possible. The area thus outlined was incised deep to adipose tissue with a #15 scalpel blade. The skin margins were undermined to an appropriate distance in all directions utilizing iris scissors and carried over to close the primary defect. Complex Repair And Rhombic Flap Text: The defect edges were debeveled with a #15 scalpel blade.  The primary defect was closed partially with a complex linear closure.  Given the location of the remaining defect, shape of the defect and the proximity to free margins a rhombic flap was deemed most appropriate for complete closure of the defect.  Using a sterile surgical marker, an appropriate advancement flap was drawn incorporating the defect and placing the expected incisions within the relaxed skin tension lines where possible. The area thus outlined was incised deep to adipose tissue with a #15 scalpel blade. The skin margins were undermined to an appropriate distance in all directions utilizing iris scissors and carried over to close the primary defect. Complex Repair And Transposition Flap Text: The defect edges were debeveled with a #15 scalpel blade.  The primary defect was closed partially with a complex linear closure.  Given the location of the remaining defect, shape of the defect and the proximity to free margins a transposition flap was deemed most appropriate for complete closure of the defect.  Using a sterile surgical marker, an appropriate advancement flap was drawn incorporating the defect and placing the expected incisions within the relaxed skin tension lines where possible. The area thus outlined was incised deep to adipose tissue with a #15 scalpel blade. The skin margins were undermined to an appropriate distance in all directions utilizing iris scissors and carried over to close the primary defect. Complex Repair And V-Y Plasty Text: The defect edges were debeveled with a #15 scalpel blade.  The primary defect was closed partially with a complex linear closure.  Given the location of the remaining defect, shape of the defect and the proximity to free margins a V-Y plasty was deemed most appropriate for complete closure of the defect.  Using a sterile surgical marker, an appropriate advancement flap was drawn incorporating the defect and placing the expected incisions within the relaxed skin tension lines where possible. The area thus outlined was incised deep to adipose tissue with a #15 scalpel blade. The skin margins were undermined to an appropriate distance in all directions utilizing iris scissors and carried over to close the primary defect. Complex Repair And M Plasty Text: The defect edges were debeveled with a #15 scalpel blade.  The primary defect was closed partially with a complex linear closure.  Given the location of the remaining defect, shape of the defect and the proximity to free margins an M plasty was deemed most appropriate for complete closure of the defect.  Using a sterile surgical marker, an appropriate advancement flap was drawn incorporating the defect and placing the expected incisions within the relaxed skin tension lines where possible. The area thus outlined was incised deep to adipose tissue with a #15 scalpel blade. The skin margins were undermined to an appropriate distance in all directions utilizing iris scissors and carried over to close the primary defect. Complex Repair And Double M Plasty Text: The defect edges were debeveled with a #15 scalpel blade.  The primary defect was closed partially with a complex linear closure.  Given the location of the remaining defect, shape of the defect and the proximity to free margins a double M plasty was deemed most appropriate for complete closure of the defect.  Using a sterile surgical marker, an appropriate advancement flap was drawn incorporating the defect and placing the expected incisions within the relaxed skin tension lines where possible. The area thus outlined was incised deep to adipose tissue with a #15 scalpel blade. The skin margins were undermined to an appropriate distance in all directions utilizing iris scissors and carried over to close the primary defect. Complex Repair And W Plasty Text: The defect edges were debeveled with a #15 scalpel blade.  The primary defect was closed partially with a complex linear closure.  Given the location of the remaining defect, shape of the defect and the proximity to free margins a W plasty was deemed most appropriate for complete closure of the defect.  Using a sterile surgical marker, an appropriate advancement flap was drawn incorporating the defect and placing the expected incisions within the relaxed skin tension lines where possible. The area thus outlined was incised deep to adipose tissue with a #15 scalpel blade. The skin margins were undermined to an appropriate distance in all directions utilizing iris scissors and carried over to close the primary defect. Complex Repair And Z Plasty Text: The defect edges were debeveled with a #15 scalpel blade.  The primary defect was closed partially with a complex linear closure.  Given the location of the remaining defect, shape of the defect and the proximity to free margins a Z plasty was deemed most appropriate for complete closure of the defect.  Using a sterile surgical marker, an appropriate advancement flap was drawn incorporating the defect and placing the expected incisions within the relaxed skin tension lines where possible. The area thus outlined was incised deep to adipose tissue with a #15 scalpel blade. The skin margins were undermined to an appropriate distance in all directions utilizing iris scissors and carried over to close the primary defect. Complex Repair And Dorsal Nasal Flap Text: The defect edges were debeveled with a #15 scalpel blade.  The primary defect was closed partially with a complex linear closure.  Given the location of the remaining defect, shape of the defect and the proximity to free margins a dorsal nasal flap was deemed most appropriate for complete closure of the defect.  Using a sterile surgical marker, an appropriate flap was drawn incorporating the defect and placing the expected incisions within the relaxed skin tension lines where possible. The area thus outlined was incised deep to adipose tissue with a #15 scalpel blade. The skin margins were undermined to an appropriate distance in all directions utilizing iris scissors and carried over to close the primary defect. Complex Repair And Ftsg Text: The defect edges were debeveled with a #15 scalpel blade.  The primary defect was closed partially with a complex linear closure.  Given the location of the defect, shape of the defect and the proximity to free margins a full thickness skin graft was deemed most appropriate to repair the remaining defect.  The graft was trimmed to fit the size of the remaining defect.  The graft was then placed in the primary defect, oriented appropriately, and sutured into place. Complex Repair And Burow's Graft Text: The defect edges were debeveled with a #15 scalpel blade.  The primary defect was closed partially with a complex linear closure.  Given the location of the defect, shape of the defect, the proximity to free margins and the presence of a standing cone deformity a Burow's graft was deemed most appropriate to repair the remaining defect.  The graft was trimmed to fit the size of the remaining defect.  The graft was then placed in the primary defect, oriented appropriately, and sutured into place. Complex Repair And Split-Thickness Skin Graft Text: The defect edges were debeveled with a #15 scalpel blade.  The primary defect was closed partially with a complex linear closure.  Given the location of the defect, shape of the defect and the proximity to free margins a split thickness skin graft was deemed most appropriate to repair the remaining defect.  The graft was trimmed to fit the size of the remaining defect.  The graft was then placed in the primary defect, oriented appropriately, and sutured into place. Complex Repair And Epidermal Autograft Text: The defect edges were debeveled with a #15 scalpel blade.  The primary defect was closed partially with a complex linear closure.  Given the location of the defect, shape of the defect and the proximity to free margins an epidermal autograft was deemed most appropriate to repair the remaining defect.  The graft was trimmed to fit the size of the remaining defect.  The graft was then placed in the primary defect, oriented appropriately, and sutured into place. Complex Repair And Dermal Autograft Text: The defect edges were debeveled with a #15 scalpel blade.  The primary defect was closed partially with a complex linear closure.  Given the location of the defect, shape of the defect and the proximity to free margins an dermal autograft was deemed most appropriate to repair the remaining defect.  The graft was trimmed to fit the size of the remaining defect.  The graft was then placed in the primary defect, oriented appropriately, and sutured into place. Complex Repair And Tissue Cultured Epidermal Autograft Text: The defect edges were debeveled with a #15 scalpel blade.  The primary defect was closed partially with a complex linear closure.  Given the location of the defect, shape of the defect and the proximity to free margins an tissue cultured epidermal autograft was deemed most appropriate to repair the remaining defect.  The graft was trimmed to fit the size of the remaining defect.  The graft was then placed in the primary defect, oriented appropriately, and sutured into place. Complex Repair And Xenograft Text: The defect edges were debeveled with a #15 scalpel blade.  The primary defect was closed partially with a complex linear closure.  Given the location of the defect, shape of the defect and the proximity to free margins a xenograft was deemed most appropriate to repair the remaining defect.  The graft was trimmed to fit the size of the remaining defect.  The graft was then placed in the primary defect, oriented appropriately, and sutured into place. Complex Repair And Skin Substitute Graft Text: The defect edges were debeveled with a #15 scalpel blade.  The primary defect was closed partially with a complex linear closure.  Given the location of the remaining defect, shape of the defect and the proximity to free margins a skin substitute graft was deemed most appropriate to repair the remaining defect.  The graft was trimmed to fit the size of the remaining defect.  The graft was then placed in the primary defect, oriented appropriately, and sutured into place. Path Notes (To The Dermatopathologist): Please check margins. Consent was obtained from the patient. The risks and benefits to therapy were discussed in detail. Specifically, the risks of infection, scarring, bleeding, prolonged wound healing, incomplete removal, allergy to anesthesia, nerve injury and recurrence were addressed. Prior to the procedure, the treatment site was clearly identified and confirmed by the patient. All components of Universal Protocol/PAUSE Rule completed. Post-Care Instructions: I reviewed with the patient in detail post-care instructions. Patient is not to engage in any heavy lifting, exercise, or swimming for the next 14 days. Should the patient develop any fevers, chills, bleeding, severe pain patient will contact the office immediately. Home Suture Removal Text: Patient was provided a home suture removal kit and will remove their sutures at home.  If they have any questions or difficulties they will call the office. Where Do You Want The Question To Include Opioid Counseling Located?: Case Summary Tab Billing Type: Third-Party Bill Information: Selecting Yes will display possible errors in your note based on the variables you have selected. This validation is only offered as a suggestion for you. PLEASE NOTE THAT THE VALIDATION TEXT WILL BE REMOVED WHEN YOU FINALIZE YOUR NOTE. IF YOU WANT TO FAX A PRELIMINARY NOTE YOU WILL NEED TO TOGGLE THIS TO 'NO' IF YOU DO NOT WANT IT IN YOUR FAXED NOTE.

## 2024-05-01 NOTE — PROGRESS NOTE ADULT - PROBLEM SELECTOR PROBLEM 4
Call 911 for stroke/Need for follow up after discharge/Prescribed medications/Risk factors for stroke/Stroke education booklet/Stroke support groups for patients, families, and friends/Stroke warning signs and symptoms/Signs and symptoms of stroke Ventricular tachyarrhythmia

## 2024-05-22 NOTE — ED ADULT NURSE NOTE - PRIMARY CARE PROVIDER
Sho Anderson presents today for   Chief Complaint   Patient presents with    Follow-up     3 month       Sho Anderson preferred language for health care discussion is english/other.    Is someone accompanying this pt? yes    Is the patient using any DME equipment during OV? yes    Depression Screening:  Depression: Not at risk (2/21/2024)    PHQ-2     PHQ-2 Score: 0        Learning Assessment:  Who is the primary learner? Patient    What is the preferred language for health care of the primary learner? ENGLISH    How does the primary learner prefer to learn new concepts? DEMONSTRATION    Answered By patient    Relationship to Learner SELF           Pt currently taking Anticoagulant therapy? Eliquis 2.5mg twice a day    Pt currently taking Antiplatelet therapy ? no      Coordination of Care:  1. Have you been to the ER, urgent care clinic since your last visit? Hospitalized since your last visit? no    2. Have you seen or consulted any other health care providers outside of the [facility] since your last visit? Include any pap smears or colon screening. no    
Sho Anderson presents today for a 3 month follow-up. I saw her in Feb. 2024.   She was last seen by Dr. Thomas in early Sept. 2023.  She was hospitalized at [facility] from 4/2/24 through 4/6/24 for acute on chronic CHF, acute pulmonary edema, and acute on chronic hypoxic respiratory failure.  Chest x-ray was consistent with pulmonary edema.  She had an echo done on 4/3/24 and it showed an EF of 65%, normal wall motion, mild concentric LVH, mild pulmonary hypertension with PASP of 40 mmHg, mild MR, aortic valve sclerosis without stenosis.  She was diuresed with IV lasix and discharged home on bumetanide 2mg BID.  However, she states that she did not receive a prescription for the new dose and did not know that the dose was increased from what she was taking before which was 1.5mg BID.     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      She had an echo done on 12/23/23 and it showed an EF of 60-65%, normal wall motion, RV and RA size has increased, mild MR, moderate TR, RVSP 40mm Hg (down from 54 mmHg in 2022).    She presented to [facility] ED on 1/22/24 for complaints of chest pain which \"felt like gas\" and resolved after taking Mylanta at home.  Her EKG showed no ischemic changes, Troponins were 26, 25, 24, not consistent with ACS.  Chest x-ray was clear.  She was asymptomatic in the ER and the pain was suspected to be of GI etiology.   She was then hospitalized at [facility] from 1/30/24 thorugh 2/3/24 for influenza, acute respiratory failure with hypoxia, and acute on chronic CHF (combined).  She was treated with IV diuretics and her blood pressure medications were adjusted.      Sho Anderson is a 88 y.o. 
unknown

## 2024-05-31 NOTE — PROGRESS NOTE ADULT - PROBLEM SELECTOR PLAN 8
Lizz is here today for   Chief Complaint   Patient presents with    Office Visit     Per patient she is having a hard time coughing due to difficulty breathing/chest tightness. Denies any other cold, flu-like symptoms. Per patient she is using her albuterol inhaler prescribed from pulmonary, but it does not help. She is also using an inhaler from a friend, which also does not seem to be helping. This started last week.    .        Medication Refills needed today?  NO,   if you receive a prescription today would you like it to be sent to [facility]? NO    Medications: medications verified, no change    Patient would like communication of their results via:    Home Phone: [phone] (home)  Okay to leave a message containing results? Yes    Tobacco history: verified         Health Maintenance       Hepatitis B Vaccine (For Physician/APC Discussion) (2 of 2 - CpG 2-dose series)  Overdue since 10/19/2023    Medicare Advantage- Medicare Wellness Visit (Yearly - January to December)  Overdue since 1/1/2024    COVID-19 Vaccine (7 - 2023-24 season)  Overdue since 4/18/2024           Following review of the above:  Patient is not proceeding with: COVID-19 and Hep B and medicare wellness.    Note: Refer to final orders and clinician documentation.            COVID-19 Vaccine Interest Assessment:   Patient reported already received outside of [facility]  If the patient reports an outside immunization, please update the WIR/ICARE information in the Immunizations activity         
- Now improved
- Noted to be hyponatremic on labs today, Na currently 130. Given  cc x 1 for poor PO intake. Continue to monitor

## 2024-06-26 NOTE — PROGRESS NOTE ADULT - REASON FOR ADMISSION
Addended by: ALBA POPE on: 6/26/2024 09:03 AM     Modules accepted: Orders     Anemia, Supratherapeutic INR, Dark Stools

## 2024-08-08 NOTE — PROGRESS NOTE ADULT - SUBJECTIVE AND OBJECTIVE BOX
Worsening   A1c: 6.0 -> 6.1 (10/2023)  Lifestyle changes recommended  F/u A1c   INTERVAL HPI/OVERNIGHT EVENTS:  HPI:  62M with no PMHx here with chest pain. Began last week, described as pleuritic, worsens with cough and deep inspiration. Has been having SOB as well, dry cough. Recently vacationed in [facility] came back today. Saw doctor in [facility] but unclear what workup was. No fever, sick contacts, abd pain. CP described as midsternal, nonradiating. Has 25 pack year smoking history.  CTPA does not reveal PE, Pulm congestion noted.   No new overnight event.  No N/V/D.  Tolerating diet.    MEDICATIONS  (STANDING):  aspirin enteric coated 81 milliGRAM(s) Oral daily  docusate sodium 100 milliGRAM(s) Oral three times a day  polyethylene glycol 3350 17 Gram(s) Oral daily  pantoprazole    Tablet 40 milliGRAM(s) Oral before breakfast  lactobacillus acidophilus and bulgaricus Chewable 1 Tablet(s) Chew three times a day  sorbitol 70% Solution 30 milliLiter(s) Oral once  senna 2 Tablet(s) Oral at bedtime  oxyCODONE  ER Tablet 10 milliGRAM(s) Oral every 12 hours  lisinopril 5 milliGRAM(s) Oral daily  acetaminophen   Tablet. 975 milliGRAM(s) Oral every 8 hours  lidocaine   Patch 1 Patch Transdermal daily  warfarin 6 milliGRAM(s) Oral once    MEDICATIONS  (PRN):  bisacodyl Suppository 10 milliGRAM(s) Rectal daily PRN Constipation      Allergies    No Known Allergies    Intolerances          General:  No wt loss, fevers, chills, night sweats, fatigue,   Eyes:  Good vision, no reported pain  ENT:  No sore throat, pain, runny nose, dysphagia  CV:  No pain, palpitations, hypo/hypertension  Resp:  No dyspnea, cough, tachypnea, wheezing  GI:  No pain, No nausea, No vomiting, No diarrhea, No constipation, No weight loss, No fever, No pruritis, No rectal bleeding, No tarry stools, No dysphagia,  :  No pain, bleeding, incontinence, nocturia  Muscle:  No pain, weakness  Neuro:  No weakness, tingling, memory problems  Psych:  No fatigue, insomnia, mood problems, depression  Endocrine:  No polyuria, polydipsia, cold/heat intolerance  Heme:  No petechiae, ecchymosis, easy bruisability  Skin:  No rash, tattoos, scars, edema      PHYSICAL EXAM:   Vital Signs:  Vital Signs Last 24 Hrs  T(C): 36.7 (01 Oct 2017 13:00), Max: 36.8 (30 Sep 2017 18:00)  T(F): 98.1 (01 Oct 2017 13:00), Max: 98.3 (30 Sep 2017 18:58)  HR: 82 (01 Oct 2017 13:00) (73 - 91)  BP: --  BP(mean): 76 (01 Oct 2017 13:00) (72 - 84)  RR: 16 (01 Oct 2017 13:00) (16 - 18)  SpO2: 99% (01 Oct 2017 13:00) (98% - 100%)  Daily     Daily I&O's Summary    30 Sep 2017 07:01  -  01 Oct 2017 07:00  --------------------------------------------------------  IN: 1524 mL / OUT: 2450 mL / NET: -926 mL    01 Oct 2017 07:01  -  01 Oct 2017 15:36  --------------------------------------------------------  IN: 480 mL / OUT: 500 mL / NET: -20 mL        GENERAL:  Appears stated age, well-groomed, well-nourished, no distress  HEENT:  NC/AT,  conjunctivae clear and pink, no thyromegaly, nodules, adenopathy, no JVD, sclera -anicteric  CHEST:  Full & symmetric excursion, no increased effort, breath sounds clear  HEART:  Regular rhythm, S1, S2, no murmur/rub/S3/S4, no abdominal bruit, no edema  ABDOMEN:  Soft, non-tender, non-distended, normoactive bowel sounds,  no masses ,no hepato-splenomegaly, no signs of chronic liver disease  EXTEREMITIES:  no cyanosis,clubbing or edema  SKIN:  No rash/erythema/ecchymoses/petechiae/wounds/abscess/warm/dry  NEURO:  Alert, oriented, no asterixis, no tremor, no encephalopathy      LABS:                        8.9    11.0  )-----------( 389      ( 01 Oct 2017 01:13 )             26.6     10-01    128<L>  |  91<L>  |  15  ----------------------------<  178<H>  5.0   |  26  |  0.62    Ca    9.3      01 Oct 2017 01:13      PT/INR - ( 01 Oct 2017 01:13 )   PT: 22.7 sec;   INR: 2.05 ratio         PTT - ( 01 Oct 2017 01:13 )  PTT:103.0 sec    amylase   lipase  RADIOLOGY & ADDITIONAL TESTS:

## 2024-08-27 NOTE — PROGRESS NOTE ADULT - PROVIDER SPECIALTY LIST ADULT
Infectious Disease Medication requesting: Lorazepam 1mg (procedural)     Reason why patient is requesting refill:    Patient has an upcoming procedure on 2024  MAVIS - Medial branch block, Bilateral, Cervical, C2, C3  First Diagnostic Block    Is refill request a controlled substance medication:  Yes. The patient has not had telephone or emergency/urgent care pain encounters since the patient's last visit on 2024.    PDMP reviewed: Appropriate *copy & paste PDMP back to last office visit if on controlled substance*    Lorazepam  1MG / Tablet  Rx# [id number] Benzodiazepine Qty: 2  Days: 1  Refills: 0 Prescribed: 2024  Dispensed: 2024  Sold: 2024 [id number] - [place], [place] - [address]   [place] [address] Jose Luis Ferguson  : 1979 [address]  [place] [address]  Pay Type: Medicaid     Last procedure was 2024  Sacroiliac Injection, Bilateral for therapeutic relief with steroid      f on contract (update):  Urine tox screen:  Appropriate: 23. 24, ETOH present, no Oxycodone present.  Patient counseled   Prescription Drug Monitoring Program verified this visit.  Opioid contract: Yes, Date: 24    Next Office Visit Date: 2024    Last Office Visit Plan of Care: 2024 Jennifer  ASSESSMENT AND PLAN:  Jose Luis Ferguson is a 44 year old female with history of but not limited to ACDF C4-C7 (), fibromyalgia, GERD,  left knee arthroscopy on 2023 and gastric ulcers.      Today Jose Luis Ferguson continues with migraines, neck pain, low back, bilateral buttocks and bilateral lateral hips,  bilateral knee, and bilateral feet pain.  Since patient's last visit 24, she had Bilateral lesser occipital nerve blocks on 2024 that provided minimal relief. She had bilateral cervical trigger point injections performed on 2024 which provided 89% relief and 7/10/2024 she had cervical  and lumbar trigger point injections that provided 80% relief.  She had bilateral radiofrequency ablations medial branch/dorsal ramus lumbar L3,L4, L5 performed on 01/29/2024 and 02/12/2024 which provided 67% relief on the left side and 71% relief on the right side. The left lesser occipital nerve block performed on 02/22/2024 provided 100% relief X 1.5 months then started to wane.   The worst pain is neck and buttock pain And on 8/26/24 she is scheduled for bilateral Sacroiliac joint injections. I will also schedule and cervical mbb/RFA C2, C3 as she had ACDF C4-C7, occipital neuralgia, cervical pain.  In future plan greater trochanter bursa injections. I recommended the patient schedule repeat lumbar trigger point injections along with cervical trigger point injections.   In future, may consider Botox for migraines.      The patient's lumbar x-ray from 10/25/2023 was significant for lower lumbar facet degenerative changes.  The patient continues to have tenderness to her low-back.  Cervical x-ray (2024) shows no evidence of hardware failure for C4-C7 ACDF.  Her bilateral hip x-ray (2024) is normal.     Regarding the patient's medications, the patient is currently prescribed #28 percocet per month and she can only take 0.5 tab BID.  The patient's most recent UDS with inappropriate with alcohol present and no oxycodone and will repeat today.  I counseled the patient that she can not drink alcohol we will receiving oxycodone per her pain contract.  Tizanidine causes sleepiness, so I will continue Methocarbamol for myalgia in daytime and use Tizanidine at bedtime. As she was diagnosed with migraines in the early 2000s by Neurologist, I will continue Topamax for her migraines and chronic pain and she is finding it helpful. In the future will consider to increase the Topamax. . In future consider Botox.      Recommend TENS unit for myalgia.      All pertinent diagnostic imaging was reviewed and discussed with the patient  during this visit.    Based on today's evaluation, I informed patient that the likely generators of her pain are myalgia, sacroiliac joint pain, greater trochanter bursitis, bilateral occipital neuralgia, lumbar spondylosis, cervical spondylosis, internal derangement of left knee, status post cervical spinal fusion, and pain in multiple joints.  We discussed the risks and benefits of medication management and interventional treatment.  Our multimodal treatment options are outlined below.     DIAGNOSIS:   Myalgia  (primary encounter diagnosis)  Bilateral occipital neuralgia  Sacroiliitis (CMD)  Medication management  Cervical spondylolysis  Lumbosacral spondylosis without myelopathy  Pain in joint, multiple sites  Acromioclavicular joint pain, unspecified laterality  Pain management contract agreement         PLAN:  Medications:  No medication adjustments at this time.  Refill Topirimate 25 mg BID. In future consider increase by 12.5 mg every 2 weeks to a target on 50 mg BID and will need cbc and bmp  Refill Methocarbamol 500 mg, 250-500 mg  BID during day time prn #30, Refill: 2   Refill oxycodone 5-325 mg #28 R2 take 0.5 tablets BID as needed for pain, consider decrease in future to #21 per month  Refill tizanidine 4 mg nightly prn #30, R 2  No NSAIDs due to history of gastric ulcer  Diagnostics:   None at this time  Interventions:   May repeat  Trigger Point Injections Bilateral trapezius, rhomboid and paracervicals without Steroid Bilateral lumbar paraspinal, lumborum quadratus and multifidus without Steroid in office 4 times yearly prn due to insurance. (October 2024)  Schedule Bilateral Sacroiliac joint injection 8/26/24   Schedule Cervical Medial Branch Block (CMBB) Bilateral C2 and C3 x2 in procedure room. If helpful, proceed with radiofrequency ablation.  Consider bilateral GTB injections.  Consider Left AC joint injections  Advanced/Devices:   None indicated at this time.   TENs Unit  Consults: None  indicated at this time.  Physical Therapy: Recommend ongoing activity as tolerated.   Importance of exercising 150 minutes a week, stretching, mindfulness, healthy diet choices, goals for healthy weight  Return in about 12 weeks (around 8/29/2024) for folow up with Jennifer to discuss next steps.  Opioid Risk Assessment:   Pain psych assessment:  Urine tox screen UDS appropriate 2/9/2023, inappropriate due to ETOH and no percocet on 1/17/2024 patient counseled 6/6/24 and plan repeat today 8/1/24  Prescription Drug Monitoring Program verified this visit.  Opioid contract 1/17/2024  Discussed with patient the risks/benefits of opioids.

## 2024-10-02 NOTE — PROGRESS NOTE ADULT - PROBLEM/PLAN-4
Patient Contact    Attempt # 1 to pt and pt's son Michelle    Was call answered?  No.  Unable to leave message - neither voicemail have been setup.    Upon callback, please review PCP message and assist with scheduling as requested.     DISPLAY PLAN FREE TEXT

## 2024-10-14 NOTE — PROGRESS NOTE ADULT - ATTENDING SUPERVISION STATEMENT
22
ACP
ACP
Fellow
ACP
Fellow
Resident
ACP
Fellow
ACP
Fellow
ACP
Fellow
ACP
Fellow
Fellow
ACP
ACP
Fellow
ACP
Fellow
ACP
ACP
Fellow
ACP
Fellow
ACP
Fellow
ACP
ACP
Fellow
ACP
Fellow
ACP
Fellow
ACP
Fellow
ACP
Fellow
ACP
Fellow
ACP
ACP
Fellow
ACP
Fellow
Fellow
ACP

## 2024-10-23 NOTE — PATIENT PROFILE ADULT - DOMESTIC TRAVEL HIGH RISK QUESTION
63 y/o F presents to ED via EMS with c/o lethargy prior to dialysis. Per EMS they were called to outpatient dialysis center because pt was lethargic, and hypotensive SBP in 80s. Per EMS pt SBP was 100s upon their assessment. Pt states she has been feeling ill past few days after she received the flu shot and anesthesia on Monday while getting her fistula evaluated for patency. Pt endorses throwing up x2 this am, feeling lethargic and a "terrible headache". Pt denies chest pain, SOB, recent falls and traumas. Pt receives dialysis MWF and did not received it today d/t symptoms noted via staff. Lt fistula on L bicep, thrill felt. Pt has metaport of right chest wall, per pt port is for "blood transfusions". MD notified of port. Pt placed in position of comfort. Bed in lowest position, wheels locked, appropriate side rails raised. Pt denies needs at this time.
No

## 2024-12-02 NOTE — DISCHARGE NOTE PROVIDER - NSDCCPCAREPLAN_GEN_ALL_CORE_FT
discharge planning PRINCIPAL DISCHARGE DIAGNOSIS  Diagnosis: Anemia  Assessment and Plan of Treatment: s/p 2 uPRBC on admission      SECONDARY DISCHARGE DIAGNOSES  Diagnosis: LVAD (left ventricular assist device) present  Assessment and Plan of Treatment:     Diagnosis: Closed head injury  Assessment and Plan of Treatment: s/p packing of left ear

## 2025-01-06 NOTE — PROGRESS NOTE ADULT - PROBLEM SELECTOR PROBLEM 2
OB Visit    S: Patient is feeling exhausted. Has been having painful contractions every 10 minutes for the past week.  They do get a little better with rest. Is very ready to be done being pregnant.     Denies LOF, VB. + FM.     O: /72   Pulse 88   Wt 97.6 kg (215 lb 1.6 oz)   LMP 2024 (Exact Date)   BMI 32.71 kg/m    Gen: Well-appearing, NAD  Resp: no increased work of breathing  Cvx: 5.5/70/-2. Membranes swept    FHT: 151 bpm  EFW: 8.25 lb    A/P:  Flaquita Shetty is a 35 year old  at 38w1d by LMP CW 9 week US here for return OB visit.     1. Obesity: Pre E labs, A1C, ASA 81mg at 12 weeks, growth at 28 & 34  weeks  2. HSV: on suppression, asymptomatic  3. Maternal depression: doing okay   4. Tension headache: reglan benadryl, chiro, PT as needed   5. LGA. S>D. 12/10 US EFW 2820 g, EFW 89%tile, AC > 98%tile     Prenatal labs WNL, Rh +, Rubella immune, GCT failed, passed 3 hour, 3rd Trimester HGB 11.8, GBS negative  Rhogam: NA  Genetics: low risk XY, low risk carrier  Imaging: dating at 9 weeks, anatomy WNL  Immunizations: TDAP 24, FLU declined, RSV 24  Delivery Plan: epidural, breastfeeding   BC after delivery: Tubal after delivery.   Delivery Hx:  X2- pelvis proven to [id number]     Will return to clinic in 1 hour for reassessment.  If not feeling more uncomfortable/no cervical change, will have her come back later this week for another appointment. Would offer a 39 week IOL.     Rivka Ordonez MD    Acute on chronic systolic heart failure, NYHA class 4

## 2025-01-27 NOTE — H&P ADULT - NSHPLANGLIMITEDENGLISH_GEN_A_CORE
Left message to call back  
Patient has questions regarding her appt with Dr. Egan scheduled for 2/3/25 would a call back from RN. Please advise.   
No

## 2025-03-07 NOTE — PROGRESS NOTE ADULT - ASSESSMENT
Refill Routing Note   Medication(s) are not appropriate for processing by [facility] for the following reason(s):        Required labs outdated    [facility] action(s):  Defer   Requires appointment : Yes     Requires labs : Yes             Appointments  past 12m or future 3m with PCP    Date Provider   Last Visit   2/6/2024 Joseph Segura MD   Next Visit   Visit date not found Joseph Segura MD   ED visits in past 90 days: 0        Note composed:6:16 AM 03/07/2025                 66y with dysphagia, indirect laryngoscopy unremarkable.

## 2025-04-14 NOTE — CONSULT NOTE ADULT - SUBJECTIVE AND OBJECTIVE BOX
HPI:  65M PMH ACC/AHA stage D HF due to NICM HM2 LVAD , TV annuloplasty ring 9/12/17 as destination therapy due to severe peripheral artery disease with significant stenosis  SIADH, Depression, CKD-3 with hyperkalemia, past E. coli UTIs, driveline drainage (1/7/21) and COVID-19 (back in April 2020)  He was recently seen in clinic where he complained of abdominal pain and dark stools w constipation back in May. He presents to [facility] ER today weakness and fatigue, moderate and + Black stools for three days, on coumadin secondary to warfarin use in the setting of an LVAD. Patient has required transfusions for GIB in the past. Mostly recently back in jan 2021 pt had anemia with dark stools. No interventions was done at that time. However Last Endoscopy was done in July 2020 (negative). Today labs show patient is anemic with H/H of 4.5/16.3,. INR is 8.84 MAP in the 90s, Temp 35.1. He denies any chest pain, shortness of breath, dizziness, abd pain, nausea or vomiting.       (14 Jun 2021 16:57)  Patient has no history thyroid disease, was not on any thyroid supplements, previously euthyroid (June 2021), now in hyperthyroid state.  Endo was consulted for further management and recommendations.    PAST MEDICAL & SURGICAL HISTORY:  CHF (congestive heart failure)    CAD (coronary artery disease)    Depression    Pleural effusion    History of 2019 novel coronavirus disease (COVID-19)  april 2020    Hemorrhoids    Bleeding hemorrhoids    Peripheral arterial disease    Claudication    BPH with urinary obstruction    ACC/AHA stage D systolic heart failure    Anticoagulation goal of INR 2.0 to 2.5    Falls    Clavicle fracture    CKD (chronic kidney disease), stage III    Iron deficiency anemia    H/O epistaxis    Vertigo    GI bleed    S/P TVR (tricuspid valve repair)    S/P ventricular assist device    S/P endoscopy        FAMILY HISTORY:      Social History:    Outpatient Medications:    MEDICATIONS  (STANDING):  chlorhexidine 0.12% Liquid 15 milliLiter(s) Oral Mucosa every 12 hours  chlorhexidine 2% Cloths 1 Application(s) Topical <User Schedule>  clonazePAM  Tablet 0.5 milliGRAM(s) Oral at bedtime  dextrose 5%. 1000 milliLiter(s) (25 mL/Hr) IV Continuous <Continuous>  heparin   Injectable 5000 Unit(s) SubCutaneous every 8 hours  insulin lispro (ADMELOG) corrective regimen sliding scale   SubCutaneous every 6 hours  metoclopramide Injectable 10 milliGRAM(s) IV Push every 8 hours  mirtazapine 7.5 milliGRAM(s) Oral daily  pantoprazole  Injectable 40 milliGRAM(s) IV Push every 12 hours  polyethylene glycol 3350 17 Gram(s) Oral daily  senna 2 Tablet(s) Oral at bedtime    MEDICATIONS  (PRN):  acetaminophen    Suspension .. 650 milliGRAM(s) Oral every 6 hours PRN Mild Pain (1 - 3)  ondansetron Injectable 4 milliGRAM(s) IV Push every 6 hours PRN Nausea and/or Vomiting      Allergies    No Known Allergies    Intolerances      Review of Systems:  Constitutional: No fever, no chills  Eyes: No blurry vision  Neuro: No tremors  HEENT: No pain, no neck swelling  Cardiovascular: No chest pain, no palpitations  Respiratory: Has SOB, no cough  GI: No nausea, vomiting, abdominal pain  : No dysuria  Skin: no rash  MSK: Has leg swelling.  Psych: no depression  Endocrine: no polyuria, polydipsia    ALL OTHER SYSTEMS REVIEWED AND NEGATIVE    UNABLE TO OBTAIN    PHYSICAL EXAM:  VITALS: T(C): 36.5 (08-08-21 @ 12:00)  T(F): 97.7 (08-08-21 @ 12:00), Max: 98.3 (08-08-21 @ 08:00)  HR: 110 (08-08-21 @ 16:00) (77 - 120)  BP: --  RR:  (6 - 40)  SpO2:  (92% - 100%)  Wt(kg): --  GENERAL: NAD, well-groomed, well-developed  EYES: No proptosis, no lid lag  HEENT:  Atraumatic, Normocephalic  THYROID: Normal size, no palpable nodules  RESPIRATORY: Clear to auscultation bilaterally; No rales, rhonchi, wheezing  CARDIOVASCULAR: Si S2, No murmurs;  GI: Soft, non distended, normal bowel sounds  SKIN: Dry, intact, No rashes or lesions  MUSCULOSKELETAL: Has BL lower extremity edema.  NEURO:  no tremor, sensation decreased in feet BL,    POCT Blood Glucose.: 83 mg/dL (08-08-21 @ 11:46)  POCT Blood Glucose.: 86 mg/dL (08-08-21 @ 06:18)  POCT Blood Glucose.: 85 mg/dL (08-07-21 @ 18:21)  POCT Blood Glucose.: 101 mg/dL (08-07-21 @ 12:13)  POCT Blood Glucose.: 121 mg/dL (08-07-21 @ 06:12)  POCT Blood Glucose.: 94 mg/dL (08-06-21 @ 23:56)  POCT Blood Glucose.: 84 mg/dL (08-06-21 @ 18:50)  POCT Blood Glucose.: 77 mg/dL (08-06-21 @ 17:19)  POCT Blood Glucose.: 103 mg/dL (08-06-21 @ 11:41)  POCT Blood Glucose.: 121 mg/dL (08-06-21 @ 06:44)  POCT Blood Glucose.: 115 mg/dL (08-05-21 @ 18:12)                            8.1    7.04  )-----------( 259      ( 08 Aug 2021 00:48 )             26.0       08-08    136  |  100  |  7   ----------------------------<  87  4.0   |  22  |  0.57    EGFR if : 125  EGFR if non : 108    Ca    9.5      08-08  Mg     1.5     08-08  Phos  2.7     08-08    TPro  7.1  /  Alb  3.2<L>  /  TBili  0.5  /  DBili  x   /  AST  19  /  ALT  22  /  AlkPhos  136<H>  08-08      Thyroid Function Tests:  08-08 @ 08:14 TSH <0.01 FreeT4 >7.8 T3 206 Anti TPO -- Anti Thyroglobulin Ab -- TSI --  08-07 @ 09:15 TSH <0.01 FreeT4 -- T3 -- Anti TPO -- Anti Thyroglobulin Ab -- TSI --          07-28 Chol -- Direct LDL -- LDL calculated -- HDL -- Trig 679<H>, 06-27 Chol -- Direct LDL -- LDL calculated -- HDL -- Trig 145, 06-26 Chol -- Direct LDL -- LDL calculated -- HDL -- Trig 191<H>, 06-25 Chol -- Direct LDL -- LDL calculated -- HDL -- Trig 183<H>, 06-22 Chol -- Direct LDL -- LDL calculated -- HDL -- Trig 518<H>    Radiology:              HPI:  65M PMH ACC/AHA stage D HF due to NICM HM2 LVAD , TV annuloplasty ring 9/12/17 as destination therapy due to severe peripheral artery disease with significant stenosis  SIADH, Depression, CKD-3 with hyperkalemia, past E. coli UTIs, driveline drainage (1/7/21) and COVID-19 (back in April 2020)  He was recently seen in clinic where he complained of abdominal pain and dark stools w constipation back in May. He presents to [facility] ER today weakness and fatigue, moderate and + Black stools for three days, on coumadin secondary to warfarin use in the setting of an LVAD. Patient has required transfusions for GIB in the past. Mostly recently back in jan 2021 pt had anemia with dark stools. No interventions was done at that time. However Last Endoscopy was done in July 2020 (negative). Today labs show patient is anemic with H/H of 4.5/16.3,. INR is 8.84 MAP in the 90s, Temp 35.1. He denies any chest pain, shortness of breath, dizziness, abd pain, nausea or vomiting.       (14 Jun 2021 16:57)  Patient has no history thyroid disease, was not on any thyroid supplements, previously euthyroid (June 2021), now in hyperthyroid state.  Endo was consulted for further management and recommendations.    PAST MEDICAL & SURGICAL HISTORY:  CHF (congestive heart failure)    CAD (coronary artery disease)    Depression    Pleural effusion    History of 2019 novel coronavirus disease (COVID-19)  april 2020    Hemorrhoids    Bleeding hemorrhoids    Peripheral arterial disease    Claudication    BPH with urinary obstruction    ACC/AHA stage D systolic heart failure    Anticoagulation goal of INR 2.0 to 2.5    Falls    Clavicle fracture    CKD (chronic kidney disease), stage III    Iron deficiency anemia    H/O epistaxis    Vertigo    GI bleed    S/P TVR (tricuspid valve repair)    S/P ventricular assist device    S/P endoscopy        FAMILY HISTORY:      Social History:    Outpatient Medications:    MEDICATIONS  (STANDING):  chlorhexidine 0.12% Liquid 15 milliLiter(s) Oral Mucosa every 12 hours  chlorhexidine 2% Cloths 1 Application(s) Topical <User Schedule>  clonazePAM  Tablet 0.5 milliGRAM(s) Oral at bedtime  dextrose 5%. 1000 milliLiter(s) (25 mL/Hr) IV Continuous <Continuous>  heparin   Injectable 5000 Unit(s) SubCutaneous every 8 hours  insulin lispro (ADMELOG) corrective regimen sliding scale   SubCutaneous every 6 hours  metoclopramide Injectable 10 milliGRAM(s) IV Push every 8 hours  mirtazapine 7.5 milliGRAM(s) Oral daily  pantoprazole  Injectable 40 milliGRAM(s) IV Push every 12 hours  polyethylene glycol 3350 17 Gram(s) Oral daily  senna 2 Tablet(s) Oral at bedtime    MEDICATIONS  (PRN):  acetaminophen    Suspension .. 650 milliGRAM(s) Oral every 6 hours PRN Mild Pain (1 - 3)  ondansetron Injectable 4 milliGRAM(s) IV Push every 6 hours PRN Nausea and/or Vomiting      Allergies    No Known Allergies    Intolerances      Review of Systems:  Constitutional: No fever, no chills  Eyes: No blurry vision  Neuro: No tremors  HEENT: No pain, no neck swelling  Cardiovascular: No chest pain, no palpitations  Respiratory: Has SOB, no cough  GI: No nausea, vomiting, abdominal pain  : No dysuria  Skin: no rash  MSK: Has leg swelling.  Psych: no depression  Endocrine: no polyuria, polydipsia    ALL OTHER SYSTEMS REVIEWED AND NEGATIVE    UNABLE TO OBTAIN    PHYSICAL EXAM:  VITALS: T(C): 36.5 (08-08-21 @ 12:00)  T(F): 97.7 (08-08-21 @ 12:00), Max: 98.3 (08-08-21 @ 08:00)  HR: 110 (08-08-21 @ 16:00) (77 - 120)  BP: --  RR:  (6 - 40)  SpO2:  (92% - 100%)  Wt(kg): --  GENERAL: NAD, well-groomed, well-developed  EYES: No proptosis, no lid lag  HEENT:  Atraumatic, Normocephalic  THYROID: Normal size, no palpable nodules  RESPIRATORY: Clear to auscultation bilaterally; No rales, rhonchi, wheezing  CARDIOVASCULAR: Si S2, No murmurs;  GI: Soft, non distended, normal bowel sounds  SKIN: Dry, intact, No rashes or lesions  MUSCULOSKELETAL: Has BL lower extremity edema.  NEURO:  no tremor, sensation decreased in feet BL,    POCT Blood Glucose.: 83 mg/dL (08-08-21 @ 11:46)  POCT Blood Glucose.: 86 mg/dL (08-08-21 @ 06:18)  POCT Blood Glucose.: 85 mg/dL (08-07-21 @ 18:21)  POCT Blood Glucose.: 101 mg/dL (08-07-21 @ 12:13)  POCT Blood Glucose.: 121 mg/dL (08-07-21 @ 06:12)  POCT Blood Glucose.: 94 mg/dL (08-06-21 @ 23:56)  POCT Blood Glucose.: 84 mg/dL (08-06-21 @ 18:50)  POCT Blood Glucose.: 77 mg/dL (08-06-21 @ 17:19)  POCT Blood Glucose.: 103 mg/dL (08-06-21 @ 11:41)  POCT Blood Glucose.: 121 mg/dL (08-06-21 @ 06:44)  POCT Blood Glucose.: 115 mg/dL (08-05-21 @ 18:12)                            8.1    7.04  )-----------( 259      ( 08 Aug 2021 00:48 )             26.0       08-08    136  |  100  |  7   ----------------------------<  87  4.0   |  22  |  0.57    EGFR if : 125  EGFR if non : 108    Ca    9.5      08-08  Mg     1.5     08-08  Phos  2.7     08-08    TPro  7.1  /  Alb  3.2<L>  /  TBili  0.5  /  DBili  x   /  AST  19  /  ALT  22  /  AlkPhos  136<H>  08-08      Thyroid Function Tests:  08-08 @ 08:14 TSH <0.01 FreeT4 >7.8 T3 206 Anti TPO -- Anti Thyroglobulin Ab -- TSI --  08-07 @ 09:15 TSH <0.01 FreeT4 -- T3 -- Anti TPO -- Anti Thyroglobulin Ab -- TSI --          07-28 Chol -- Direct LDL -- LDL calculated -- HDL -- Trig 679<H>, 06-27 Chol -- Direct LDL -- LDL calculated -- HDL -- Trig 145, 06-26 Chol -- Direct LDL -- LDL calculated -- HDL -- Trig 191<H>, 06-25 Chol -- Direct LDL -- LDL calculated -- HDL -- Trig 183<H>, 06-22 Chol -- Direct LDL -- LDL calculated -- HDL -- Trig 518<H>    Radiology:              None known

## 2025-04-18 NOTE — PROGRESS NOTE ADULT - SUBJECTIVE AND OBJECTIVE BOX
[facility] Annual Sexual Health Assessment     Jony was seen by Prevention Nurse in conjunction with his ID appt on 4/16/25.     Patient is an established patient. Diagnosed in 2019.   CD4 count:  828  Viral load:  undetectable  ART:   Biktarvy    Explained to patient that we complete a sexual health assessment once yearly. Jony is  to his  of several years. Jony is from [place]. His  is his only partner. Works at [employer] in box [employer]. Partner is negative and is not on prep. This prompted conversation on importance of medication adherence to maintain undetectable status.          [facility] DIVISION OF KIDNEY DISEASES AND HYPERTENSION -- FOLLOW UP NOTE  --------------------------------------------------------------------------------  Chief Complaint: Hyponatremia     24 hour events/subjective:    seen and examined this morning   reports feeling well without acute complaints   no acute events noted overnight    PAST HISTORY  --------------------------------------------------------------------------------  No significant changes to PMH, PSH, FHx, SHx, unless otherwise noted    ALLERGIES & MEDICATIONS  --------------------------------------------------------------------------------  Allergies    Amiodarone Hydrochloride (Other (Severe))    Intolerances      Standing Inpatient Medications  chlorhexidine 2% Cloths 1 Application(s) Topical <User Schedule>  insulin lispro (ADMELOG) corrective regimen sliding scale   SubCutaneous every 6 hours  methimazole 15 milliGRAM(s) Oral daily  metoclopramide Injectable 10 milliGRAM(s) IV Push every 8 hours  mirtazapine 7.5 milliGRAM(s) Oral at bedtime  multivitamin 1 Tablet(s) Oral daily  ondansetron Injectable 8 milliGRAM(s) IV Push every 8 hours  pantoprazole  Injectable 40 milliGRAM(s) IV Push every 12 hours  predniSONE   Tablet 4 milliGRAM(s) Oral daily  prochlorperazine   Tablet 10 milliGRAM(s) Oral three times a day  sertraline 100 milliGRAM(s) Oral daily  sodium chloride 0.9%. 1000 milliLiter(s) IV Continuous <Continuous>  thiamine 100 milliGRAM(s) Oral daily    PRN Inpatient Medications  fentaNYL    Injectable 12 MICROGram(s) IV Push every 3 hours PRN  guaiFENesin Oral Liquid (Sugar-Free) 200 milliGRAM(s) Oral every 4 hours PRN  simethicone 80 milliGRAM(s) Chew every 8 hours PRN      REVIEW OF SYSTEMS    All other systems were reviewed and are negative, except as noted.    VITALS/PHYSICAL EXAM  --------------------------------------------------------------------------------  T(C): 36.1 (10-26-21 @ 04:00), Max: 36.2 (10-25-21 @ 20:00)  HR: 117 (10-26-21 @ 11:00) (101 - 132)  BP: --  RR: 23 (10-26-21 @ 11:00) (16 - 33)  SpO2: 97% (10-26-21 @ 11:00) (92% - 100%)  Wt(kg): --        10-25-21 @ 07:01  -  10-26-21 @ 07:00  --------------------------------------------------------  IN: 2060 mL / OUT: 1675 mL / NET: 385 mL    10-26-21 @ 07:01  -  10-26-21 @ 11:31  --------------------------------------------------------  IN: 110 mL / OUT: 0 mL / NET: 110 mL        Physical Exam:  	Gen: NAD  	HEENT: MMM, trach  	Pulm: CTA B/L  	CV: S1S2  	Abd: Soft, +BS, dawn tube   	Ext: No LE edema B/L  	Neuro: Awake and alert   	Skin: Warm and dry  	Vascular access: N/A      LABS/STUDIES  --------------------------------------------------------------------------------              9.1    12.89 >-----------<  253      [10-26-21 @ 01:18]              27.2     129  |  92  |  5   ----------------------------<  75      [10-26-21 @ 01:18]  3.9   |  21  |  0.55        Ca     9.5     [10-26-21 @ 01:18]      Mg     1.6     [10-26-21 @ 01:18]      Phos  2.5     [10-26-21 @ 01:18]    TPro  7.4  /  Alb  3.7  /  TBili  0.6  /  DBili  x   /  AST  13  /  ALT  13  /  AlkPhos  211  [10-26-21 @ 01:18]              [10-26-21 @ 01:18]  Serum Osmolality 262      [10-25-21 @ 15:57]    Creatinine Trend:  SCr 0.55 [10-26 @ 01:18]  SCr 0.50 [10-25 @ 16:59]  SCr 0.49 [10-25 @ 00:44]  SCr 0.54 [10-24 @ 01:10]  SCr 0.60 [10-23 @ 01:08]      Urine Sodium 70      [10-25-21 @ 16:36]  Urine Osmolality 210      [10-25-21 @ 16:36]    Iron 27, TIBC 202, %sat 13      [08-15-21 @ 10:20]  Ferritin 498      [08-15-21 @ 10:20]  Vitamin D (25OH) 11.0      [01-12-21 @ 14:15]  HbA1c 6.2      [09-01-17 @ 00:08]  TSH <0.01      [09-25-21 @ 04:14]  Lipid: chol 153, , HDL 34, LDL --      [08-15-21 @ 13:53]       no weight-bearing restrictions

## 2025-04-29 NOTE — SPEAKING VALVE EVALUATION - AIRWAY TYPE
REFERRAL APPROVAL NOTICE         Sent on April 29, 2025                   Jamie Powellr  [address]  [place] [address]                   Dear Mr. Gely Figueroa,    After a careful review of the medical information and benefit coverage, [facility] has processed your referral. See below for additional details.    If applicable, you must be actively enrolled with your insurance for coverage of the authorized service. If you have any questions regarding your coverage, please contact your insurance directly.    REFERRAL INFORMATION   Referral #:  [id number]  Referred-To Provider    Referred-By Provider:  Dermatology    Dennise Curry P.A.-C.   [facility]      [address]  [place] [address]  [phone] [address]  [place] [address]  [phone]    Referral Start Date:  04/22/2025  Referral End Date:   04/22/2026             SCHEDULING  If you do not already have an appointment, please call [phone] to make an appointment.     MORE INFORMATION  If you do not already have a [employer] account, sign up at: [email or web address]  You can access your medical information, make appointments, see lab results, billing information, and more.  If you have questions regarding this referral, please contact  the [facility] Referrals department at:             [phone]. Monday - Friday 8:00AM - 5:00PM.     Sincerely,    [facility]    
Ricky
Ricky
[place]
Ricky
Ricky

## 2025-05-12 NOTE — PROGRESS NOTE ADULT - TIME BILLING
2:42 PM  TRANSFER - OUT REPORT:    Verbal report given to Emy Jensen Jr  being transferred to Cath Lab for ordered procedure       Report consisted of patient's Situation, Background, Assessment and   Recommendations(SBAR).     Information from the following report(s) Nurse Handoff Report was reviewed with the receiving nurse.           Lines:   Peripheral IV 05/11/25 Distal;Left;Anterior Cephalic (Active)   Site Assessment Clean, dry & intact 05/12/25 0800   Line Status Infusing 05/12/25 0800   Line Care Connections checked and tightened 05/12/25 0800   Phlebitis Assessment No symptoms 05/12/25 0800   Infiltration Assessment 0 05/12/25 0800   Alcohol Cap Used Yes 05/12/25 0800   Dressing Status Clean, dry & intact 05/12/25 0800   Dressing Type Transparent 05/12/25 0800       Peripheral IV 05/12/25 Right;Anterior Forearm (Active)   Site Assessment Clean, dry & intact 05/12/25 0800   Line Status Infusing 05/12/25 0800   Line Care Connections checked and tightened 05/12/25 0800   Phlebitis Assessment No symptoms 05/12/25 0800   Infiltration Assessment 0 05/12/25 0800   Alcohol Cap Used Yes 05/12/25 0800   Dressing Status Clean, dry & intact 05/12/25 0800   Dressing Type Transparent 05/12/25 0800        Opportunity for questions and clarification was provided.      Patient transported with:  Registered Nurse    3:22 PM  TRANSFER - IN REPORT:    Verbal report received from Oneyda tr Jensen Jr  being received from Cath Lab for routine post-op      Report consisted of patient's Situation, Background, Assessment and   Recommendations(SBAR).     Information from the following report(s) Nurse Handoff Report was reviewed with the receiving nurse.    Opportunity for questions and clarification was provided.      Assessment completed upon patient's arrival to unit and care assumed.      4:23 PM  TRANSFER - OUT REPORT:    Verbal report given to Keniaaudi tr Jensen Jr  being transferred to [place] [address] for 
- Review of notes/records, telemetry, vital signs and daily labs.   - General and cardiovascular physical examination.  - Generation of cardiovascular treatment plan.  - Coordination of care with primary team.
heart failure
Beyond f/u on the patient (time spent 16') I also contacted the patient's sister and gave her a f/u regarding the patient's condition and lack of significant medical improvement (still with poor neuro status and depending on tube feeds). I d/w her about the need to set up a f/u meeting to further discuss about nutritional GOC. I indicated that if the patient continues to be a poor candidate for PEG that then a discussion about a more conservative approach was needed.
- Review of notes/records, telemetry, vital signs and daily labs.   - General and cardiovascular physical examination.  - Generation of cardiovascular treatment plan.  - Coordination of care with primary team.

## 2025-05-12 NOTE — PROGRESS NOTE ADULT - PROBLEM SELECTOR PLAN 1
Tetracycline Pregnancy And Lactation Text: This medication is Pregnancy Category D and not consider safe during pregnancy. It is also excreted in breast milk. Azithromycin Pregnancy And Lactation Text: This medication is considered safe during pregnancy and is also secreted in breast milk. Bactrim Pregnancy And Lactation Text: This medication is Pregnancy Category D and is known to cause fetal risk.  It is also excreted in breast milk. Aklief Pregnancy And Lactation Text: It is unknown if this medication is safe to use during pregnancy.  It is unknown if this medication is excreted in breast milk.  Breastfeeding women should use the topical cream on the smallest area of the skin for the shortest time needed while breastfeeding.  Do not apply to nipple and areola. Spironolactone Pregnancy And Lactation Text: This medication can cause feminization of the male fetus and should be avoided during pregnancy. The active metabolite is also found in breast milk. Azelaic Acid Pregnancy And Lactation Text: This medication is considered safe during pregnancy and breast feeding. Benzoyl Peroxide Pregnancy And Lactation Text: This medication is Pregnancy Category C. It is unknown if benzoyl peroxide is excreted in breast milk. Topical Retinoid Pregnancy And Lactation Text: This medication is Pregnancy Category C. It is unknown if this medication is excreted in breast milk. Winlevi Pregnancy And Lactation Text: This medication is considered safe during pregnancy and breastfeeding. Topical Clindamycin Pregnancy And Lactation Text: This medication is Pregnancy Category B and is considered safe during pregnancy. It is unknown if it is excreted in breast milk. Tazorac Pregnancy And Lactation Text: This medication is not safe during pregnancy. It is unknown if this medication is excreted in breast milk. Winlevi Counseling:  I discussed with the patient the risks of topical clascoterone including but not limited to erythema, scaling, itching, and stinging. Patient voiced their understanding. Erythromycin Pregnancy And Lactation Text: This medication is Pregnancy Category B and is considered safe during pregnancy. It is also excreted in breast milk. Use Enhanced Medication Counseling?: No Dapsone Pregnancy And Lactation Text: This medication is Pregnancy Category C and is not considered safe during pregnancy or breast feeding. High Dose Vitamin A Pregnancy And Lactation Text: High dose vitamin A therapy is contraindicated during pregnancy and breast feeding. Isotretinoin Pregnancy And Lactation Text: This medication is Pregnancy Category X and is considered extremely dangerous during pregnancy. It is unknown if it is excreted in breast milk. Birth Control Pills Pregnancy And Lactation Text: This medication should be avoided if pregnant and for the first 30 days post-partum. appears to be functioning normally  maintain current settings  cont asa 81mg daily  cont coumadin.  1.5 his INR is subtherapeutic Doxycycline Pregnancy And Lactation Text: This medication is Pregnancy Category D and not consider safe during pregnancy. It is also excreted in breast milk but is considered safe for shorter treatment courses. Sarecycline Counseling: Patient advised regarding possible photosensitivity and discoloration of the teeth, skin, lips, tongue and gums.  Patient instructed to avoid sunlight, if possible.  When exposed to sunlight, patients should wear protective clothing, sunglasses, and sunscreen.  The patient was instructed to call the office immediately if the following severe adverse effects occur:  hearing changes, easy bruising/bleeding, severe headache, or vision changes.  The patient verbalized understanding of the proper use and possible adverse effects of sarecycline.  All of the patient's questions and concerns were addressed. Tetracycline Counseling: Patient counseled regarding possible photosensitivity and increased risk for sunburn.  Patient instructed to avoid sunlight, if possible.  When exposed to sunlight, patients should wear protective clothing, sunglasses, and sunscreen.  The patient was instructed to call the office immediately if the following severe adverse effects occur:  hearing changes, easy bruising/bleeding, severe headache, or vision changes.  The patient verbalized understanding of the proper use and possible adverse effects of tetracycline.  All of the patient's questions and concerns were addressed. Patient understands to avoid pregnancy while on therapy due to potential birth defects. Isotretinoin Counseling: Patient should get monthly blood tests, not donate blood, not drive at night if vision affected, not share medication, and not undergo elective surgery for 6 months after tx completed. Side effects reviewed, pt to contact office should one occur. Bactrim Counseling:  I discussed with the patient the risks of sulfa antibiotics including but not limited to GI upset, allergic reaction, drug rash, diarrhea, dizziness, photosensitivity, and yeast infections.  Rarely, more serious reactions can occur including but not limited to aplastic anemia, agranulocytosis, methemoglobinemia, blood dyscrasias, liver or kidney failure, lung infiltrates or desquamative/blistering drug rashes. Birth Control Pills Counseling: Birth Control Pill Counseling: I discussed with the patient the potential side effects of OCPs including but not limited to increased risk of stroke, heart attack, thrombophlebitis, deep venous thrombosis, hepatic adenomas, breast changes, GI upset, headaches, and depression.  The patient verbalized understanding of the proper use and possible adverse effects of OCPs. All of the patient's questions and concerns were addressed. Aklief counseling:  Patient advised to apply a pea-sized amount only at bedtime and wait 30 minutes after washing their face before applying.  If too drying, patient may add a non-comedogenic moisturizer.  The most commonly reported side effects including irritation, redness, scaling, dryness, stinging, burning, itching, and increased risk of sunburn.  The patient verbalized understanding of the proper use and possible adverse effects of retinoids.  All of the patient's questions and concerns were addressed. Azelaic Acid Counseling: Patient counseled that medicine may cause skin irritation and to avoid applying near the eyes.  In the event of skin irritation, the patient was advised to reduce the amount of the drug applied or use it less frequently.   The patient verbalized understanding of the proper use and possible adverse effects of azelaic acid.  All of the patient's questions and concerns were addressed. Benzoyl Peroxide Counseling: Patient counseled that medicine may cause skin irritation and bleach clothing.  In the event of skin irritation, the patient was advised to reduce the amount of the drug applied or use it less frequently.   The patient verbalized understanding of the proper use and possible adverse effects of benzoyl peroxide.  All of the patient's questions and concerns were addressed. Topical Sulfur Applications Counseling: Topical Sulfur Counseling: Patient counseled that this medication may cause skin irritation or allergic reactions.  In the event of skin irritation, the patient was advised to reduce the amount of the drug applied or use it less frequently.   The patient verbalized understanding of the proper use and possible adverse effects of topical sulfur application.  All of the patient's questions and concerns were addressed. Azithromycin Counseling:  I discussed with the patient the risks of azithromycin including but not limited to GI upset, allergic reaction, drug rash, diarrhea, and yeast infections. Topical Retinoid counseling:  Patient advised to apply a pea-sized amount only at bedtime and wait 30 minutes after washing their face before applying.  If too drying, patient may add a non-comedogenic moisturizer. The patient verbalized understanding of the proper use and possible adverse effects of retinoids.  All of the patient's questions and concerns were addressed. Tazorac Counseling:  Patient advised that medication is irritating and drying.  Patient may need to apply sparingly and wash off after an hour before eventually leaving it on overnight.  The patient verbalized understanding of the proper use and possible adverse effects of tazorac.  All of the patient's questions and concerns were addressed. Topical Clindamycin Counseling: Patient counseled that this medication may cause skin irritation or allergic reactions.  In the event of skin irritation, the patient was advised to reduce the amount of the drug applied or use it less frequently.   The patient verbalized understanding of the proper use and possible adverse effects of clindamycin.  All of the patient's questions and concerns were addressed. Topical Sulfur Applications Pregnancy And Lactation Text: This medication is Pregnancy Category C and has an unknown safety profile during pregnancy. It is unknown if this topical medication is excreted in breast milk. Erythromycin Counseling:  I discussed with the patient the risks of erythromycin including but not limited to GI upset, allergic reaction, drug rash, diarrhea, increase in liver enzymes, and yeast infections. Detail Level: Detailed Dapsone Counseling: I discussed with the patient the risks of dapsone including but not limited to hemolytic anemia, agranulocytosis, rashes, methemoglobinemia, kidney failure, peripheral neuropathy, headaches, GI upset, and liver toxicity.  Patients who start dapsone require monitoring including baseline LFTs and weekly CBCs for the first month, then every month thereafter.  The patient verbalized understanding of the proper use and possible adverse effects of dapsone.  All of the patient's questions and concerns were addressed. Spironolactone Counseling: Patient advised regarding risks of diarrhea, abdominal pain, hyperkalemia, birth defects (for female patients), liver toxicity and renal toxicity. The patient may need blood work to monitor liver and kidney function and potassium levels while on therapy. The patient verbalized understanding of the proper use and possible adverse effects of spironolactone.  All of the patient's questions and concerns were addressed. High Dose Vitamin A Counseling: Side effects reviewed, pt to contact office should one occur. Minocycline Counseling: Patient advised regarding possible photosensitivity and discoloration of the teeth, skin, lips, tongue and gums.  Patient instructed to avoid sunlight, if possible.  When exposed to sunlight, patients should wear protective clothing, sunglasses, and sunscreen.  The patient was instructed to call the office immediately if the following severe adverse effects occur:  hearing changes, easy bruising/bleeding, severe headache, or vision changes.  The patient verbalized understanding of the proper use and possible adverse effects of minocycline.  All of the patient's questions and concerns were addressed. Doxycycline Counseling:  Patient counseled regarding possible photosensitivity and increased risk for sunburn.  Patient instructed to avoid sunlight, if possible.  When exposed to sunlight, patients should wear protective clothing, sunglasses, and sunscreen.  The patient was instructed to call the office immediately if the following severe adverse effects occur:  hearing changes, easy bruising/bleeding, severe headache, or vision changes.  The patient verbalized understanding of the proper use and possible adverse effects of doxycycline.  All of the patient's questions and concerns were addressed.

## 2025-06-11 NOTE — PROGRESS NOTE ADULT - REASON FOR ADMISSION
1930: Bedside and Verbal shift change report given to Caren RN (oncoming nurse) by Su RN (offgoing nurse). Report included the following information Nurse Handoff Report, Intake/Output, MAR, Recent Results, Med Rec Status, and Cardiac Rhythm sinus rhythm .     Assumed care of patient. He is A&O x4, DOUGLAS, FC. Wife is at bedside. Male Purewick intact. Patient is on 6 L NC.     2100: Daughter at bedside. Daughter would like to know if patient it still have blood sugar checked since he is on high dose steroids and what will he get to help him sleep at night. Discussed the use of precedex as relayed in report. Advised no order for blood sugar checks. Will discuss with NP.     After talking to NP, she would like patient to try ativan for sleep first and if no help then can administer precedex. She also ordered blood sugar checks for patient.     2209: PRN ativan given.     0600: CXR at bedside. Labs drawn. Daughter reports patient has been coughing and restless all night, but didn't tell nurse. Offered to wash patient up for the day, but he refused at this time stating he wanted to sleep some more.     0730: Bedside and Verbal shift change report given to Su STRICKLAND (oncoming nurse) by Caren RN (offgoing nurse). Report included the following information Nurse Handoff Report, Intake/Output, MAR, Recent Results, Med Rec Status, and Cardiac Rhythm sinus rhythm .      Anemia, Supratherapeutic INR, Dark Stools

## 2025-06-24 NOTE — PROGRESS NOTE ADULT - ASSESSMENT
Instruct her to cut lisinopril in 1/2 and continue amlodipine.  Let me know if blood pressure continue to run low and I will continue to adjust.   Continues to report periods of frustration and low mood with extended hospitalization.   His family is due to return from [facility] on Saturday (10/21/17).  Ambulating in an[place]io multiple times per day without distress.  Appetite ok but does not like hospital food.    Receptive to support and validation.  Sleep improved.  Taking Remeron 7.5mg at bedtime.       Dx: Adjustment disorder with depression     Recommendations:  Support and validation   Will benefit from additional coping strategies to manage current stressors   Behavioral Cardiology will continue to follow

## 2025-07-10 NOTE — PROGRESS NOTE ADULT - PROBLEM SELECTOR PLAN 6
Fever greater than (need to indicate Fahrenheit or Celsius) - Per previous records - Endocrine consulted for management of hyperthyroidism in the setting of recent amiodarone use and multiple IV contrast studies. 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 Unable to get a thyroid US due to his trach.   - continue methimazole 10mg QD  - FT4 and TSH wnl

## 2025-07-11 NOTE — ED ADULT NURSE NOTE - SEPSIS REFERENCE DATA CRITERIA 1
Fax from [employer] with form to complete   
Form faxed,fax confirmation scanned in media.  
Abormal VS: Temp > 100F or < 96.8F; SBP < 90 mmHG; HR > 120bpm; Resp > 24/min

## 2025-07-29 NOTE — PATIENT PROFILE ADULT - DISASTER - FUNCTIONAL SCREEN CURRENT LEVEL: COMMUNICATION, MLM
For the week leading up to your surgery:    Do not take Aspirin, Ibuprofen, Naproxen (aleve), Meloxicam or any other \"NSAID\" medication.  You can take tylenol for pain       On the day of surgery:  Do not take: Losartan  -You may restart this medication day after surgery.     
0 = understands/communicates without difficulty